# Patient Record
Sex: MALE | Race: WHITE | NOT HISPANIC OR LATINO | Employment: FULL TIME | URBAN - METROPOLITAN AREA
[De-identification: names, ages, dates, MRNs, and addresses within clinical notes are randomized per-mention and may not be internally consistent; named-entity substitution may affect disease eponyms.]

---

## 2016-11-22 LAB
ABO + RH BLD: NORMAL
ABO + RH BLD: NORMAL
BLD GP AB SCN SERPL QL: NORMAL
BLOOD BANK CMNT PATIENT-IMP: NORMAL
SPECIMEN EXP DATE BLD: NORMAL

## 2017-01-03 ENCOUNTER — OFFICE VISIT (OUTPATIENT)
Dept: INFUSION THERAPY | Facility: CLINIC | Age: 52
End: 2017-01-03
Attending: NURSE PRACTITIONER
Payer: COMMERCIAL

## 2017-01-03 ENCOUNTER — RADIANT APPOINTMENT (OUTPATIENT)
Dept: ULTRASOUND IMAGING | Facility: CLINIC | Age: 52
End: 2017-01-03
Attending: NURSE PRACTITIONER
Payer: COMMERCIAL

## 2017-01-03 VITALS
WEIGHT: 248.6 LBS | DIASTOLIC BLOOD PRESSURE: 57 MMHG | RESPIRATION RATE: 16 BRPM | SYSTOLIC BLOOD PRESSURE: 126 MMHG | TEMPERATURE: 97.2 F | HEART RATE: 85 BPM | BODY MASS INDEX: 33.71 KG/M2 | OXYGEN SATURATION: 98 %

## 2017-01-03 DIAGNOSIS — K74.60 CIRRHOSIS OF LIVER WITH ASCITES, UNSPECIFIED HEPATIC CIRRHOSIS TYPE (H): ICD-10-CM

## 2017-01-03 DIAGNOSIS — R18.8 CIRRHOSIS OF LIVER WITH ASCITES, UNSPECIFIED HEPATIC CIRRHOSIS TYPE (H): ICD-10-CM

## 2017-01-03 DIAGNOSIS — K74.60 HEPATIC CIRRHOSIS, UNSPECIFIED HEPATIC CIRRHOSIS TYPE (H): Primary | ICD-10-CM

## 2017-01-03 DIAGNOSIS — C22.0 HEPATOCELLULAR CARCINOMA (H): ICD-10-CM

## 2017-01-03 LAB
ALBUMIN SERPL-MCNC: 2.8 G/DL (ref 3.4–5)
BILIRUB SERPL-MCNC: 5.5 MG/DL (ref 0.2–1.3)
CREAT SERPL-MCNC: 0.82 MG/DL (ref 0.66–1.25)
GFR SERPL CREATININE-BSD FRML MDRD: NORMAL ML/MIN/1.7M2
INR PPP: 1.85 (ref 0.86–1.14)
SODIUM SERPL-SCNC: 130 MMOL/L (ref 133–144)

## 2017-01-03 PROCEDURE — 25000128 H RX IP 250 OP 636: Mod: ZF | Performed by: NURSE PRACTITIONER

## 2017-01-03 PROCEDURE — 85610 PROTHROMBIN TIME: CPT | Performed by: INTERNAL MEDICINE

## 2017-01-03 PROCEDURE — 82040 ASSAY OF SERUM ALBUMIN: CPT | Performed by: INTERNAL MEDICINE

## 2017-01-03 PROCEDURE — 96365 THER/PROPH/DIAG IV INF INIT: CPT | Mod: ZF

## 2017-01-03 PROCEDURE — 84295 ASSAY OF SERUM SODIUM: CPT | Performed by: INTERNAL MEDICINE

## 2017-01-03 PROCEDURE — P9047 ALBUMIN (HUMAN), 25%, 50ML: HCPCS | Mod: ZF | Performed by: NURSE PRACTITIONER

## 2017-01-03 PROCEDURE — 82565 ASSAY OF CREATININE: CPT | Performed by: INTERNAL MEDICINE

## 2017-01-03 PROCEDURE — 82247 BILIRUBIN TOTAL: CPT | Performed by: INTERNAL MEDICINE

## 2017-01-03 PROCEDURE — 27210190 US PARACENTESIS

## 2017-01-03 PROCEDURE — 27210995 ZZH RX 272: Mod: ZF | Performed by: NURSE PRACTITIONER

## 2017-01-03 RX ORDER — ALBUMIN (HUMAN) 12.5 G/50ML
12.5 SOLUTION INTRAVENOUS 4 TIMES DAILY PRN
Status: DISCONTINUED | OUTPATIENT
Start: 2017-01-03 | End: 2017-01-03 | Stop reason: HOSPADM

## 2017-01-03 RX ADMIN — ALBUMIN HUMAN 12.5 G: 0.25 SOLUTION INTRAVENOUS at 12:43

## 2017-01-03 RX ADMIN — ALBUMIN HUMAN 12.5 G: 0.25 SOLUTION INTRAVENOUS at 13:06

## 2017-01-03 RX ADMIN — LIDOCAINE HYDROCHLORIDE 20 ML: 10 INJECTION, SOLUTION INFILTRATION; PERINEURAL at 12:45

## 2017-01-03 RX ADMIN — ALBUMIN HUMAN 12.5 G: 0.25 SOLUTION INTRAVENOUS at 12:55

## 2017-01-03 RX ADMIN — ALBUMIN HUMAN 12.5 G: 0.25 SOLUTION INTRAVENOUS at 12:29

## 2017-01-03 NOTE — PROGRESS NOTES
Paracentesis Nursing Note  Camacho Bhagat presents [facility] for [facility].    During today's appointment orders from ALBAN Robison NP were completed.    Progress Note:  Patient identification verified by name and date of birth.  Assessment completed.  Vitals monitored throughout appointment and recorded in Doc Flowsheets.  See proceduralist note in ultrasound.    Date of consent or authorization: 12/2/2016.  Invasive Procedure Safety Checklist was completed and sent for scanning.         The following labs were communicated to provider performing paracentesis:  PLT       59   12/27/2016    Total amount of ascites fluid drained: 4.9 liters.  Color of ascites fluid: yellow.  Total amount of albumin given: 50  grams.    Patient tolerated procedure well.    Post procedure,denies pain or discomfort post paracentesis.      Discharge Plan:  Discharge instructions were reviewed with patient.  Patient/Representative verbalized understanding and all questions were answered.   Discharged from [facility] in stable condition.    Mari Mathew RN    Administrations This Visit     albumin human 25 % injection 12.5 g     Admin Date Action Dose Route Administered By             01/03/2017 New Bag 12.5 g Intravenous Mari Mathew RN              Admin Date Action Dose Route Administered By             01/03/2017 New Bag 12.5 g Intravenous Mari Mathew RN              Admin Date Action Dose Route Administered By             01/03/2017 New Bag 12.5 g Intravenous Mari Mathew RN              Admin Date Action Dose Route Administered By             01/03/2017 New Bag 12.5 g Intravenous Mari Mathew RN                    lidocaine BUFFERED 1 % injection 20 mL     Admin Date Action Dose Route Administered By             01/03/2017 Given by Other Clinician 20 mL Injection Mari Mathew RN                          /57 mmHg  Pulse 85   Temp(Src) 97.2  F (36.2  C) (Oral)  Resp 16  Wt 112.764 kg (248 lb 9.6 oz)  SpO2 98%

## 2017-01-06 ENCOUNTER — OFFICE VISIT (OUTPATIENT)
Dept: INFUSION THERAPY | Facility: CLINIC | Age: 52
End: 2017-01-06
Attending: NURSE PRACTITIONER
Payer: COMMERCIAL

## 2017-01-06 ENCOUNTER — RADIANT APPOINTMENT (OUTPATIENT)
Dept: ULTRASOUND IMAGING | Facility: CLINIC | Age: 52
End: 2017-01-06
Attending: NURSE PRACTITIONER
Payer: COMMERCIAL

## 2017-01-06 VITALS
SYSTOLIC BLOOD PRESSURE: 149 MMHG | TEMPERATURE: 97 F | RESPIRATION RATE: 16 BRPM | DIASTOLIC BLOOD PRESSURE: 74 MMHG | OXYGEN SATURATION: 99 % | HEART RATE: 96 BPM | BODY MASS INDEX: 32.27 KG/M2 | WEIGHT: 238 LBS

## 2017-01-06 DIAGNOSIS — K74.60 HEPATIC CIRRHOSIS, UNSPECIFIED HEPATIC CIRRHOSIS TYPE (H): Primary | ICD-10-CM

## 2017-01-06 PROCEDURE — 25000128 H RX IP 250 OP 636: Mod: ZF | Performed by: NURSE PRACTITIONER

## 2017-01-06 PROCEDURE — 27210190 US PARACENTESIS

## 2017-01-06 PROCEDURE — 40000556 ZZH STATISTIC PERIPHERAL IV START W US GUIDANCE: Mod: ZF

## 2017-01-06 PROCEDURE — 96365 THER/PROPH/DIAG IV INF INIT: CPT | Mod: ZF

## 2017-01-06 PROCEDURE — P9047 ALBUMIN (HUMAN), 25%, 50ML: HCPCS | Mod: ZF | Performed by: NURSE PRACTITIONER

## 2017-01-06 PROCEDURE — 27210995 ZZH RX 272: Mod: ZF | Performed by: NURSE PRACTITIONER

## 2017-01-06 RX ORDER — ALBUMIN (HUMAN) 12.5 G/50ML
12.5 SOLUTION INTRAVENOUS 4 TIMES DAILY PRN
Status: DISCONTINUED | OUTPATIENT
Start: 2017-01-06 | End: 2017-01-06 | Stop reason: HOSPADM

## 2017-01-06 RX ADMIN — ALBUMIN HUMAN 25 G: 0.25 SOLUTION INTRAVENOUS at 12:48

## 2017-01-06 RX ADMIN — LIDOCAINE HYDROCHLORIDE 20 ML: 10 INJECTION, SOLUTION INFILTRATION; PERINEURAL at 12:45

## 2017-01-06 NOTE — PATIENT INSTRUCTIONS
Dear Camacho Bhagat    Thank you for choosing [facility] ([facility]) for your procedure.  The following information is a summary of our appointment as well as important reminders.      Please refer to your hospital discharge instructions for details on home care services, future appointments, phone numbers, and diet/activity levels.    Additional information: Your paracentesis procedure today, beginning weight 244.9 lb (111.0 kg), removed 2.6 liters ascites fluid, gave 25 grams albumin, ending weight 238.1 lb (107.9 kg).    We look forward in seeing you on your next appointment here at [facility].  Please don t hesitate to call us at [phone] to reschedule any of your appointments or to speak with one of the [facility] registered nurses.  It was a pleasure taking care of you today.    Sincerely,  Estefani Kirby RN  [facility]  [address] [place] [address]  [address]  Phone:  [phone]  DISCHARGE INSTRUCTIONS FOLLOWING ABDOMINAL PARACENTESIS    After you go home:    No strenuous activity for 24 hours    Resume your regular diet    Limit fluid intake for the first 48 hours to no more than 2 quarts per day.  There should be minimal drainage from the needle site.  If drainage does occur and soaks through the bandage, apply gentle pressure with your hand for 5 minutes.    Notify MD for the following:    Excessive drainage    Excessive swelling, redness or tenderness at the needle site    Fever greater than 101 degrees F    Dizziness or light-headedness when getting up or walking      IF THIS IS A MEDICAL EMERGENCY, CALL [phone]    If you have any questions or concerns    Contact the Hepatology Clinic:   [phone]    If you are a post-transplant patient, contact the Transplant Office:  [phone]    If this is after hours, contact the hospital :  [phone] and as to have the GI  resident on call paged                   I have received and understand my discharge instructions and I have all of my personal belongings.          ------------------------------------------------------        ---------------------------------------------------    Patient / Significant Other's Signature     Relationship

## 2017-01-06 NOTE — PROGRESS NOTES
Paracentesis Nursing Note  Caamcho Bhagat presents today to [facility] for a [facility]esis.    During today's appointment orders from Abigail Robison CNP were completed.    Progress Note:  Patient identification verified by name and date of birth.  Assessment completed.  Vitals monitored throughout appointment and recorded in Doc Flowsheets.  See proceduralist note in ultrasound.    Date of consent or authorization: 1/3/2017.  Invasive Procedure Safety Checklist was completed and sent for scanning.     Paracentesis performed by Jesse Desai PA-C Radiology.    The following labs were communicated to provider performing paracentesis:  PLT       59   12/27/2016    Total amount of ascites fluid drained: 2.6 liters.  Color of ascites fluid: yellow.  Total amount of albumin given: 25  grams.    Patient tolerated procedure well.    Post procedure,denies pain or discomfort post paracentesis.      Discharge Plan:  Discharge instructions were reviewed with patient.  Patient/Representative verbalized understanding and all questions were answered.   Discharged from [facility] in stable condition.    Estefani Kirby RN    Administrations This Visit     albumin human 25 % injection 12.5 g     Admin Date Action Dose Route Administered By             01/06/2017 New Bag 50 g Intravenous Estefani Kirby RN                    lidocaine BUFFERED 1 % injection 20 mL     Admin Date Action Dose Route Administered By             01/06/2017 Given by Other 20 mL Injection Estefani Kirby RN                          /67 mmHg  Pulse 85  Temp(Src) 97  F (36.1  C) (Oral)  Resp 16  Wt 111.086 kg (244 lb 14.4 oz)  SpO2 99%

## 2017-01-06 NOTE — MR AVS SNAPSHOT
After Visit Summary   1/6/2017    Camacho Bhagat    MRN: [id number]           Patient Information     Date Of Birth          1965        Visit Information        Provider Department      1/6/2017 12:00 PM Provider, Jonathon Spec Inf Para;  [facility]        Today's Diagnoses     Hepatic cirrhosis, unspecified hepatic cirrhosis type (H)    -  1       Care Instructions    Dear Camacho Bhagat    Thank you for choosing [facility] ([facility]) for your procedure.  The following information is a summary of our appointment as well as important reminders.      Please refer to your hospital discharge instructions for details on home care services, future appointments, phone numbers, and diet/activity levels.    Additional information: Your paracentesis procedure today, beginning weight 244.9 lb (111.0 kg), removed 2.6 liters ascites fluid, gave 25 grams albumin, ending weight     We look forward in seeing you on your next appointment here at [facility].  Please don t hesitate to call us at [phone] to reschedule any of your appointments or to speak with one of the [facility] registered nurses.  It was a pleasure taking care of you today.    Sincerely,  Estefani Kirby, RN  [facility]  [address] [place] [address]  Phone:  [phone]  DISCHARGE INSTRUCTIONS FOLLOWING ABDOMINAL PARACENTESIS    After you go home:    No strenuous activity for 24 hours    Resume your regular diet    Limit fluid intake for the first 48 hours to no more than 2 quarts per day.  There should be minimal drainage from the needle site.  If drainage does occur and soaks through the bandage, apply gentle pressure with your hand for 5 minutes.    Notify MD for the following:    Excessive drainage    Excessive swelling, redness or tenderness at the needle  site    Fever greater than 101 degrees F    Dizziness or light-headedness when getting up or walking      IF THIS IS A MEDICAL EMERGENCY, CALL [phone]    If you have any questions or concerns    Contact the Hepatology Clinic:   [phone]    If you are a post-transplant patient, contact the Transplant Office:  [phone]    If this is after hours, contact the hospital :  [phone] and as to have the GI resident on call paged                   I have received and understand my discharge instructions and I have all of my personal belongings.          ------------------------------------------------------        ---------------------------------------------------    Patient / Significant Other's Signature     Relationship            Follow-ups after your visit        Your next 10 appointments already scheduled     Jan 06, 2017  4:00 PM   (Arrive by 3:45 PM)   MR ABDOMEN W/O & W CONTRAST with [address] [facility] [place] [facility] ([facility] [place])    [address] [address] [phone]           Take your medicines as usual, unless your doctor tells you not to. Bring a list of your current medicines to your exam (including vitamins, minerals and over-the-counter drugs). Also bring the results of similar scans you may have had.    The day before your exam, drink extra fluids at least six 8-ounce glasses (unless your doctor tells you to restrict your fluids).   Have a blood test (creatinine test) within 30 days of your exam. Go to your clinic or Diagnostic Imaging Department for this test.   Do not eat or drink for 6 hours prior to exam.  The MRI machine uses a strong magnet. Please wear clothes without metal (snaps, zippers). A sweatsuit works well, or we may give you a hospital gown.  Please remove any body piercings and hair extensions before you arrive. You will also remove watches, jewelry, hairpins, wallets, dentures, partial dental plates and  hearing aids. You may wear contact lenses, and you may be able to wear your rings. We have a safe place to keep your personal items, but it is safer to leave them at home.   **IMPORTANT** THE INSTRUCTIONS BELOW ARE ONLY FOR THOSE PATIENTS WHO HAVE BEEN TOLD THEY WILL RECEIVE SEDATION OR GENERAL ANESTHESIA DURING THEIR MRI PROCEDURE:  IF YOU WILL RECEIVE SEDATION (take medicine to help you relax during your exam):   You must get the medicine from your doctor before you arrive. Bring the medicine to the exam. Do not take it at home.   Arrive one hour early. Bring someone who can take you home after the test. Your medicine will make you sleepy. After the exam, you may not drive, take a bus or take a taxi by yourself.   No eating 8 hours before your exam. You may have clear liquids up until 4 hours before your exam. (Clear liquids include water, clear tea, black coffee and fruit juice without pulp.)  IF YOU WILL RECEIVE ANESTHESIA (be asleep for your exam):   Arrive 1 1/2 hours early. Bring someone who can take you home after the test. You may not drive, take a bus or take a taxi by yourself.   No eating 8 hours before your exam. You may have clear liquids up until 4 hours before your exam. (Clear liquids include water, clear tea, black coffee and fruit juice without pulp.)  If you have any questions, please contact your Imaging Department exam site.            Aric 10, 2017  2:00 PM   Paracentesis Visit with  Spec Inf Para Provider, UC 40 ATC   [facility] ([facility])    [address] [address]   [phone]            Jan 13, 2017 12:00 PM   Paracentesis Visit with  Spec Inf Para Provider, UC 40 ATC   [facility] ([facility])    [address] [address]   [phone]            Jan 18, 2017 12:00 PM    Paracentesis Visit with Uc Spec Inf Para Provider, UC 39 ATC   [facility] ([facility])    [address]  2nd Floor  [facility] [id number] [phone]            Jan 20, 2017  9:30 AM   Paracentesis Visit with Uc Spec Inf Para Provider, UC 40 ATC   [facility] ([facility])    [address]  2nd Floor  [facility] [id number] [phone]            Jan 23, 2017  2:00 PM   (Arrive by 1:45 PM)   Return General Liver with Toñito Camejo MD   [facility] ([facility])    [address]  3rd Floor  [facility] [id number] [phone]              Who to contact     If you have questions or need follow up information about today's clinic visit or your schedule please contact [facility] directly at [phone].  Normal or non-critical lab and imaging results will be communicated to you by Silistixhart, letter or phone within 4 business days after the clinic has received the results. If you do not hear from us within 7 days, please contact the clinic through [employer] or phone. If you have a critical or abnormal lab result, we will notify you by phone as soon as possible.  Submit refill requests through cityguru or call your pharmacy and they will forward the refill request to us. Please allow 3 business days for your refill to be completed.          Additional Information About Your Visit        SilistixharGateRocket Information     cityguru gives you secure access to your electronic health record. If you see a primary care provider, you can also send messages to your care team and make appointments. If you have questions, please call your primary care clinic.  If you do not have a primary care provider, please call [phone] and they will assist you.        Care EveryWhere ID     This  is your Care EveryWhere ID. This could be used by other organizations to access your [facility] medical records  [id number]        Your Vitals Were     Pulse Temperature Respirations Pulse Oximetry          96 97  F (36.1  C) (Oral) 16 99%         Blood Pressure from Last 3 Encounters:   01/06/17 149/74   01/03/17 126/57   12/30/16 149/72    Weight from Last 3 Encounters:   01/06/17 111.086 kg (244 lb 14.4 oz)   01/03/17 112.764 kg (248 lb 9.6 oz)   12/30/16 114.08 kg (251 lb 8 oz)              We Performed the Following     ABO/Rh type and screen     Treatment Conditions     US Paracentesis        Primary Care Provider Office Phone # Fax #    Shay Kirkpatrick [phone]        [facility] [address] [place] [address] [address]  [facility] [address]        Thank you!     Thank you for choosing [facility]  for your care. Our goal is always to provide you with excellent care. Hearing back from our patients is one way we can continue to improve our services. Please take a few minutes to complete the written survey that you may receive in the mail after your visit with us. Thank you!             Your Updated Medication List - Protect others around you: Learn how to safely use, store and throw away your medicines at www.disposemymeds.org.          This list is accurate as of: 1/6/17  1:24 PM.  Always use your most recent med list.                   Brand Name Dispense Instructions for use    aspirin 325 MG tablet          cyclobenzaprine 10 MG tablet    FLEXERIL    30 tablet    Take 1 tablet (10 mg) by mouth 3 times daily as needed for muscle spasms       furosemide 40 MG tablet    LASIX    30 tablet    Take 1 tablet (40 mg) by mouth daily       gabapentin 300 MG capsule    NEURONTIN    60 capsule    Take 2 capsules (600 mg) by mouth At Bedtime       glucagon 1 MG Solr injection     1 each    Inject 1 mg Subcutaneous every 15 minutes as needed for low blood sugar (May  repeat x 1 only)       insulin glargine 100 UNIT/ML injection    LANTUS    19.5 mL    Inject 65 Units Subcutaneous every 24 hours       * insulin lispro 100 UNIT/ML injection    humaLOG    1 vial    1 unit per 50 glucose over 150: If blood glucose 150 to 200 give 1 unit, if glucose 200-250 give 2 total units, 250-300 give 3 total units (1+1+1).  Give enough for one week.       * insulin lispro 100 UNIT/ML injection    humaLOG    5.4 mL    Inject 6 Units Subcutaneous 3 times daily (with meals)       lactobacillus rhamnosus (GG) capsule     60 capsule    Take 1 capsule by mouth 2 times daily       lactulose 10 GM/15ML solution    CHRONULAC    3600 mL    Take 30 mLs (20 g) by mouth 4 times daily Take 4 times daily. Goal is to have 3-4 bowel movements per day.       lisinopril 40 MG tablet    PRINIVIL/ZESTRIL         metFORMIN 500 MG tablet    GLUCOPHAGE         MULTIPLE VITAMIN PO          omeprazole 20 MG CR capsule    priLOSEC    60 capsule    Take 1 capsule (20 mg) by mouth 2 times daily (before meals)       ondansetron 4 MG ODT tab    ZOFRAN-ODT    30 tablet    Take 1 tablet (4 mg) by mouth every 8 hours as needed for nausea       oxybutynin 5 MG tablet    DITROPAN    60 tablet    Take 1 tablet (5 mg) by mouth 2 times daily       pantoprazole 40 MG EC tablet    PROTONIX         phenazopyridine 100 MG tablet    PYRIDIUM    90 tablet    Take 1 tablet (100 mg) by mouth 3 times daily (with meals)       phytonadione 5 MG tablet    MEPHYTON    30 tablet    Take 0.5 tablets (2.5 mg) by mouth daily       rifaximin 550 MG Tabs tablet    XIFAXAN    60 tablet    Take 1 tablet (550 mg) by mouth 2 times daily       sulfamethoxazole-trimethoprim 800-160 MG per tablet    BACTRIM DS/SEPTRA DS    30 tablet    Take 1 tablet by mouth daily       * Notice:  This list has 2 medication(s) that are the same as other medications prescribed for you. Read the directions carefully, and ask your doctor or other care provider to review them  with you.

## 2017-01-10 ENCOUNTER — OFFICE VISIT (OUTPATIENT)
Dept: INFUSION THERAPY | Facility: CLINIC | Age: 52
End: 2017-01-10
Attending: INTERNAL MEDICINE
Payer: COMMERCIAL

## 2017-01-10 ENCOUNTER — RADIANT APPOINTMENT (OUTPATIENT)
Dept: ULTRASOUND IMAGING | Facility: CLINIC | Age: 52
End: 2017-01-10
Attending: NURSE PRACTITIONER
Payer: COMMERCIAL

## 2017-01-10 VITALS — BODY MASS INDEX: 32.54 KG/M2 | WEIGHT: 240 LBS | OXYGEN SATURATION: 98 % | TEMPERATURE: 98.7 F

## 2017-01-10 DIAGNOSIS — K74.60 HEPATIC CIRRHOSIS, UNSPECIFIED HEPATIC CIRRHOSIS TYPE (H): Primary | ICD-10-CM

## 2017-01-10 DIAGNOSIS — C22.0 HEPATOCELLULAR CARCINOMA (H): ICD-10-CM

## 2017-01-10 DIAGNOSIS — K74.60 CIRRHOSIS OF LIVER WITH ASCITES, UNSPECIFIED HEPATIC CIRRHOSIS TYPE (H): ICD-10-CM

## 2017-01-10 DIAGNOSIS — R18.8 CIRRHOSIS OF LIVER WITH ASCITES, UNSPECIFIED HEPATIC CIRRHOSIS TYPE (H): ICD-10-CM

## 2017-01-10 LAB
ALBUMIN SERPL-MCNC: 3.2 G/DL (ref 3.4–5)
BILIRUB SERPL-MCNC: 7.4 MG/DL (ref 0.2–1.3)
CREAT SERPL-MCNC: 0.69 MG/DL (ref 0.66–1.25)
GFR SERPL CREATININE-BSD FRML MDRD: NORMAL ML/MIN/1.7M2
INR PPP: 1.49 (ref 0.86–1.14)
SODIUM SERPL-SCNC: 126 MMOL/L (ref 133–144)

## 2017-01-10 PROCEDURE — 27210190 US PARACENTESIS

## 2017-01-10 PROCEDURE — 82040 ASSAY OF SERUM ALBUMIN: CPT | Performed by: INTERNAL MEDICINE

## 2017-01-10 PROCEDURE — 82247 BILIRUBIN TOTAL: CPT | Performed by: INTERNAL MEDICINE

## 2017-01-10 PROCEDURE — 25000128 H RX IP 250 OP 636: Mod: ZF | Performed by: NURSE PRACTITIONER

## 2017-01-10 PROCEDURE — 96365 THER/PROPH/DIAG IV INF INIT: CPT | Mod: ZF

## 2017-01-10 PROCEDURE — 84295 ASSAY OF SERUM SODIUM: CPT | Performed by: INTERNAL MEDICINE

## 2017-01-10 PROCEDURE — 27210995 ZZH RX 272: Mod: ZF | Performed by: NURSE PRACTITIONER

## 2017-01-10 PROCEDURE — P9047 ALBUMIN (HUMAN), 25%, 50ML: HCPCS | Mod: ZF | Performed by: NURSE PRACTITIONER

## 2017-01-10 PROCEDURE — 82565 ASSAY OF CREATININE: CPT | Performed by: INTERNAL MEDICINE

## 2017-01-10 PROCEDURE — 85610 PROTHROMBIN TIME: CPT | Performed by: INTERNAL MEDICINE

## 2017-01-10 RX ORDER — ALBUMIN (HUMAN) 12.5 G/50ML
12.5 SOLUTION INTRAVENOUS 4 TIMES DAILY PRN
Status: DISCONTINUED | OUTPATIENT
Start: 2017-01-10 | End: 2017-01-10 | Stop reason: HOSPADM

## 2017-01-10 RX ADMIN — ALBUMIN HUMAN 50 G: 0.25 SOLUTION INTRAVENOUS at 14:51

## 2017-01-10 RX ADMIN — LIDOCAINE HYDROCHLORIDE 20 ML: 10 INJECTION, SOLUTION INFILTRATION; PERINEURAL at 14:52

## 2017-01-10 NOTE — PROGRESS NOTES
Paracentesis Nursing Note  Camacho Bhagat presents today to [facility] for [facility].  During today's appointment orders from Abigail Robison NP were completed.    Progress Note:  Patient identification verified by name and date of birth.  Assessment completed.  Vitals monitored throughout appointment and recorded in Doc Flowsheets.  See proceduralist note in ultrasound.    Date of consent or authorization: 1/3/17.  Invasive Procedure Safety Checklist was completed and sent for scanning.     Paracentesis performed by Jesse Desai PA-C Radiology.    The following labs were communicated to provider performing paracentesis:  PLT       59   12/27/2016    Total amount of ascites fluid drained: 4.3 liters.  Color of ascites fluid: yellow.  Total amount of albumin given: 50  grams.    Patient tolerated procedure well.    Post procedure,denies pain or discomfort post paracentesis.      Discharge Plan:  Discharge instructions were reviewed with patient.  Patient/Representative verbalized understanding and all questions were answered.   Discharged [facility] in stable condition.    Kyra Wallace RN    Administrations This Visit     albumin human 25 % injection 12.5 g     Admin Date Action Dose Route Administered By             01/10/2017 New Bag 50 g Intravenous Kyra Wallace RN                    lidocaine BUFFERED 1 % injection 20 mL     Admin Date Action Dose Route Administered By             01/10/2017 Given by Other Clinician 20 mL Injection Kyra Wallace RN                          Temp(Src) 98.7  F (37.1  C) (Oral)  Wt 113.036 kg (249 lb 3.2 oz)  SpO2 98%

## 2017-01-12 NOTE — PROGRESS NOTES
Procedure date: 1/10/2017:    1. Ultrasound guided therapeutic paracentesis (CSC).    Preop diagnosis: Ascites.    Postop diagnosis: Same.    Proceduralist: Miguel Desai PA-C    Assist: None.    Attending on duty: Jersey Bernardo MD    Medications: No intravenous sedation was administered. 1% buffered lidocaine, 10 cc.  50 grams 25% albumin IV.     Nursing: The patient was placed on continuous monitoring. The patient remained stable throughout the procedure.    PROCEDURE: The patient understood the limitations, alternatives, and risks of the procedure and requested the procedure be performed. Both written and oral consent were obtained.    The patient was identified by name and date of birth, and placed in the recumbent position. The left lower quadrant was prepped and draped in the usual sterile fashion. Ultrasound evaluation revealed diffuse subcutaneous edema, as well as a moderate fluid collection at this location, and an image was saved. The site overlying the fluid was anesthetized with 1% buffered lidocaine. Under ultrasound guidance, a 5 Peruvian, 15 cm. Pigtail Yueh catheter was advanced into the abdominal space, and an image was saved. Syringe aspiration revealed initial return of clear serous fluid. Catheter to vacuum drainage, with 4300 cc ascites aspirated. Repeat ultrasound revealed no significant ascites remaining. Catheter removed. Sterile dressing applied. Images were saved throughout the procedure. Procedure was well tolerated, with no immediate complications.    Estimate blood loss: Less than 1 cc.    Specimens: None.    IMPRESSION: Ultrasound guided therapeutic paracentesis. Total of  4300 cc of clear serous ascites aspirated.    PLAN: Followup per primary team.

## 2017-01-13 ENCOUNTER — DOCUMENTATION ONLY (OUTPATIENT)
Dept: TRANSPLANT | Facility: CLINIC | Age: 52
End: 2017-01-13

## 2017-01-13 ENCOUNTER — OFFICE VISIT (OUTPATIENT)
Dept: INFUSION THERAPY | Facility: CLINIC | Age: 52
End: 2017-01-13
Attending: NURSE PRACTITIONER
Payer: COMMERCIAL

## 2017-01-13 ENCOUNTER — TELEPHONE (OUTPATIENT)
Dept: GASTROENTEROLOGY | Facility: CLINIC | Age: 52
End: 2017-01-13

## 2017-01-13 ENCOUNTER — RADIANT APPOINTMENT (OUTPATIENT)
Dept: ULTRASOUND IMAGING | Facility: CLINIC | Age: 52
End: 2017-01-13
Attending: NURSE PRACTITIONER
Payer: COMMERCIAL

## 2017-01-13 VITALS
TEMPERATURE: 97.8 F | OXYGEN SATURATION: 98 % | HEART RATE: 82 BPM | SYSTOLIC BLOOD PRESSURE: 156 MMHG | WEIGHT: 214.4 LBS | DIASTOLIC BLOOD PRESSURE: 72 MMHG | BODY MASS INDEX: 29.07 KG/M2

## 2017-01-13 DIAGNOSIS — K74.60 HEPATIC CIRRHOSIS, UNSPECIFIED HEPATIC CIRRHOSIS TYPE (H): Primary | ICD-10-CM

## 2017-01-13 PROCEDURE — P9047 ALBUMIN (HUMAN), 25%, 50ML: HCPCS | Mod: ZF | Performed by: NURSE PRACTITIONER

## 2017-01-13 PROCEDURE — 27210190 US PARACENTESIS

## 2017-01-13 PROCEDURE — 25000128 H RX IP 250 OP 636: Mod: ZF | Performed by: NURSE PRACTITIONER

## 2017-01-13 PROCEDURE — 27210995 ZZH RX 272: Mod: ZF | Performed by: NURSE PRACTITIONER

## 2017-01-13 PROCEDURE — 96365 THER/PROPH/DIAG IV INF INIT: CPT | Mod: ZF

## 2017-01-13 RX ORDER — ALBUMIN (HUMAN) 12.5 G/50ML
12.5 SOLUTION INTRAVENOUS 4 TIMES DAILY PRN
Status: DISCONTINUED | OUTPATIENT
Start: 2017-01-13 | End: 2017-01-13 | Stop reason: HOSPADM

## 2017-01-13 RX ADMIN — LIDOCAINE HYDROCHLORIDE 20 ML: 10 INJECTION, SOLUTION INFILTRATION; PERINEURAL at 12:51

## 2017-01-13 RX ADMIN — ALBUMIN HUMAN 50 G: 0.25 SOLUTION INTRAVENOUS at 12:51

## 2017-01-13 NOTE — PROGRESS NOTES
1/6/17 MR  1.) 4 cm 5T active HCC    Recs:  - follow up imaging in 3 months  - no treatment options at this time due to his labs / liver function

## 2017-01-13 NOTE — TELEPHONE ENCOUNTER
Detailed message left on patient's phone regarding low sodium level and to begin 1500 ml fluid restriction.  Triage number provided if patient has any questions.

## 2017-01-13 NOTE — PROGRESS NOTES
Paracentesis Nursing Note  Camacho hBagat presents [facility].    During today's appointment orders from Ricki Hayes were completed.    Progress Note:  Patient identification verified by name and date of birth.  Assessment completed.  Vitals monitored throughout appointment and recorded in Doc Flowsheets.  See proceduralist note in ultrasound.    Date of consent or authorization: 1/3.  Invasive Procedure Safety Checklist was completed and sent for scanning.     Paracentesis performed by Mitch Dinh PA-C Radiology.    The following labs were communicated to provider performing paracentesis:  PLT       59   12/27/2016    Total amount of ascites fluid drained: 4.2 liters.  Color of ascites fluid: yellow.  Total amount of albumin given: 50  grams.    Patient tolerated procedure well.    Post procedure,denies pain or discomfort post paracentesis.      Discharge Plan:  Discharge instructions were reviewed with patient.  Patient/Representative verbalized understanding and all questions were answered.   [facility] in stable condition.    Holli Jha RN       Administrations This Visit     albumin human 25 % injection 12.5 g     Admin Date Action Dose Route Administered By             01/13/2017 New Bag 50 g Intravenous Holli Jha RN                    lidocaine BUFFERED 1 % injection 20 mL     Admin Date Action Dose Route Administered By             01/13/2017 Given by Other Clinician 20 mL Injection Holli Jha RN                            /89 mmHg  Pulse 86  Temp(Src) 97.8  F (36.6  C) (Oral)  Wt 113.626 kg (250 lb 8 oz)  SpO2 98%

## 2017-01-13 NOTE — TELEPHONE ENCOUNTER
Patient returned call for further explanation of fluid restriction.  His concern is the amount of liquid needed for his medications such as lactulose.  Discussed decreasing fluids as best he can, while continuing to take lactulose as needed to maintain about 3 bowel movements daily.  Will recheck labs next week as planned.  Patient verbalized understanding.

## 2017-01-16 ENCOUNTER — DOCUMENTATION ONLY (OUTPATIENT)
Dept: TRANSPLANT | Facility: CLINIC | Age: 52
End: 2017-01-16

## 2017-01-16 NOTE — PROGRESS NOTES
Following submitted for 1st HCC extension:    This is the first extension of a previously approved initial exception that does not meet criteria for automatic approval. The patient is a 51 year old with cryptogenic/CASTAÑEDA cirrhosis who had a MRI on 12/9/15 showing a 5.3 cm 5x lesion with an AFP of 4 on 12/14/15. The patient underwent TACE on 1/27/16, radioembolization on 4/27/16, and TACE on 9/8/16. Follow up MRI on 1/6/17 revealed a 4 cm 5T HCC with an AFP of 3 on 12/2/16. This patient has had a good response to treatment. We are requesting the patient be granted the first HCC MELD exception extension.

## 2017-01-17 ENCOUNTER — TELEPHONE (OUTPATIENT)
Dept: GASTROENTEROLOGY | Facility: CLINIC | Age: 52
End: 2017-01-17

## 2017-01-17 NOTE — TELEPHONE ENCOUNTER
Left message for pt reminding them of upcoming appointment.  Instructed pt to bring updated medications list.  Patient instructed to arrive early for check-in  Aric Villanueva CMA

## 2017-01-18 ENCOUNTER — OFFICE VISIT (OUTPATIENT)
Dept: INFUSION THERAPY | Facility: CLINIC | Age: 52
End: 2017-01-18
Attending: NURSE PRACTITIONER
Payer: COMMERCIAL

## 2017-01-18 ENCOUNTER — RADIANT APPOINTMENT (OUTPATIENT)
Dept: ULTRASOUND IMAGING | Facility: CLINIC | Age: 52
End: 2017-01-18
Attending: NURSE PRACTITIONER
Payer: COMMERCIAL

## 2017-01-18 VITALS
HEART RATE: 79 BPM | SYSTOLIC BLOOD PRESSURE: 155 MMHG | DIASTOLIC BLOOD PRESSURE: 64 MMHG | TEMPERATURE: 98.1 F | OXYGEN SATURATION: 98 % | WEIGHT: 238.3 LBS | BODY MASS INDEX: 32.31 KG/M2

## 2017-01-18 DIAGNOSIS — R18.8 CIRRHOSIS OF LIVER WITH ASCITES, UNSPECIFIED HEPATIC CIRRHOSIS TYPE (H): ICD-10-CM

## 2017-01-18 DIAGNOSIS — C22.0 HEPATOCELLULAR CARCINOMA (H): ICD-10-CM

## 2017-01-18 DIAGNOSIS — K74.60 HEPATIC CIRRHOSIS, UNSPECIFIED HEPATIC CIRRHOSIS TYPE (H): Primary | ICD-10-CM

## 2017-01-18 DIAGNOSIS — K74.60 CIRRHOSIS OF LIVER WITH ASCITES, UNSPECIFIED HEPATIC CIRRHOSIS TYPE (H): ICD-10-CM

## 2017-01-18 LAB
ALBUMIN SERPL-MCNC: 2.3 G/DL (ref 3.4–5)
BILIRUB SERPL-MCNC: 11 MG/DL (ref 0.2–1.3)
CREAT SERPL-MCNC: 0.46 MG/DL (ref 0.66–1.25)
GFR SERPL CREATININE-BSD FRML MDRD: ABNORMAL ML/MIN/1.7M2
INR PPP: 1.43 (ref 0.86–1.14)
SODIUM SERPL-SCNC: 132 MMOL/L (ref 133–144)

## 2017-01-18 PROCEDURE — 27210190 US PARACENTESIS

## 2017-01-18 PROCEDURE — 85610 PROTHROMBIN TIME: CPT | Performed by: INTERNAL MEDICINE

## 2017-01-18 PROCEDURE — 82040 ASSAY OF SERUM ALBUMIN: CPT | Performed by: INTERNAL MEDICINE

## 2017-01-18 PROCEDURE — P9047 ALBUMIN (HUMAN), 25%, 50ML: HCPCS | Mod: ZF | Performed by: NURSE PRACTITIONER

## 2017-01-18 PROCEDURE — 84295 ASSAY OF SERUM SODIUM: CPT | Performed by: INTERNAL MEDICINE

## 2017-01-18 PROCEDURE — 96365 THER/PROPH/DIAG IV INF INIT: CPT | Mod: ZF

## 2017-01-18 PROCEDURE — 82247 BILIRUBIN TOTAL: CPT | Performed by: INTERNAL MEDICINE

## 2017-01-18 PROCEDURE — 82565 ASSAY OF CREATININE: CPT | Performed by: INTERNAL MEDICINE

## 2017-01-18 PROCEDURE — 25000128 H RX IP 250 OP 636: Mod: ZF | Performed by: NURSE PRACTITIONER

## 2017-01-18 PROCEDURE — 27210995 ZZH RX 272: Mod: ZF | Performed by: NURSE PRACTITIONER

## 2017-01-18 RX ORDER — ALBUMIN (HUMAN) 12.5 G/50ML
12.5 SOLUTION INTRAVENOUS 4 TIMES DAILY PRN
Status: DISCONTINUED | OUTPATIENT
Start: 2017-01-18 | End: 2017-01-18 | Stop reason: HOSPADM

## 2017-01-18 RX ADMIN — ALBUMIN HUMAN 12.5 G: 0.25 SOLUTION INTRAVENOUS at 13:34

## 2017-01-18 RX ADMIN — LIDOCAINE HYDROCHLORIDE 20 ML: 10 INJECTION, SOLUTION INFILTRATION; PERINEURAL at 13:08

## 2017-01-18 RX ADMIN — ALBUMIN HUMAN 12.5 G: 0.25 SOLUTION INTRAVENOUS at 13:24

## 2017-01-18 RX ADMIN — ALBUMIN HUMAN 12.5 G: 0.25 SOLUTION INTRAVENOUS at 13:43

## 2017-01-18 RX ADMIN — ALBUMIN HUMAN 12.5 G: 0.25 SOLUTION INTRAVENOUS at 13:18

## 2017-01-18 NOTE — PROGRESS NOTES
Paracentesis Nursing Note  Camacho Bhagat presents today to [facility] for [facility].    During today's appointment orders from Abigail Robison CNP and Toñito Camejo MD were completed.    Progress Note:  Patient identification verified by name and date of birth.  Assessment completed.  Vitals monitored throughout appointment and recorded in Doc Flowsheets.  See proceduralist note in ultrasound.    Date of consent or authorization: 1-3-17.  Invasive Procedure Safety Checklist was completed and sent for scanning.     Paracentesis performed by Jesse Desai PA-C Radiology.    The following labs were communicated to provider performing paracentesis:  PLT       59   12/27/2016    Total amount of ascites fluid drained: 6.8 liters.  Color of ascites fluid: yellow and clear.  Total amount of albumin given: 50  grams.    Patient tolerated procedure well.    Post procedure,denies pain or discomfort post paracentesis.      Discharge Plan:  Discharge instructions were reviewed with patient.  Patient/Representative verbalized understanding and all questions were answered.   Discharged from [facility] in stable condition.    Inna Villasenor RN    Administrations This Visit     albumin human 25 % injection 12.5 g     Admin Date Action Dose Route Administered By             01/18/2017 New Bag 12.5 g Intravenous Inna Villasenor RN              Admin Date Action Dose Route Administered By             01/18/2017 New Bag 12.5 g Intravenous Inna Villasenor RN              Admin Date Action Dose Route Administered By             01/18/2017 New Bag 12.5 g Intravenous Inna Villasenor RN              Admin Date Action Dose Route Administered By             01/18/2017 New Bag 12.5 g Intravenous Inna Villasenor RN                    lidocaine BUFFERED 1 % injection 20 mL     Admin Date Action Dose Route Administered By             01/18/2017 Given by Other  Clinician 20 mL Injection Inna Villasenor, RN                          /76 mmHg  Pulse 81  Temp(Src) 98.1  F (36.7  C) (Oral)  Wt 114.805 kg (253 lb 1.6 oz)  SpO2 98%

## 2017-01-18 NOTE — PATIENT INSTRUCTIONS
Dear Camacho Bhagat    Thank you for choosing [facility] ([facility]) for your procedure.  The following information is a summary of our appointment as well as important reminders.        DISCHARGE INSTRUCTIONS FOLLOWING ABDOMINAL PARACENTESIS    After you go home:    No strenuous activity for 24 hours    Resume your regular diet    Limit fluid intake for the first 48 hours to no more than 2 quarts per day.  There should be minimal drainage from the needle site.  If drainage does occur and soaks through the bandage, apply gentle pressure with your hand for 5 minutes.    Notify MD for the following:    Excessive drainage    Excessive swelling, redness or tenderness at the needle site    Fever greater than 101 degrees F    Dizziness or light-headedness when getting up or walking      IF THIS IS A MEDICAL EMERGENCY, CALL 132    If you have any questions or concerns    Contact the Hepatology Clinic:   [phone]    If you are a post-transplant patient, contact the Transplant Office:  [phone]    If this is after hours, contact the hospital :  [phone] and as to have the GI resident on call paged                     Additional information: you had a paracentesis performed today.      We look forward in seeing you on your next appointment here at [facility].  Please don t hesitate to call us at [phone] to reschedule any of your appointments or to speak with one of the [facility] registered nurses.  It was a pleasure taking care of you today.    Sincerely,  Inna Villasenor RN  [facility]  [facility]  [address] [place] [address]  [id number]  Phone:  [phone]

## 2017-01-19 NOTE — PROGRESS NOTES
Procedure date: 1/18/2017:    1. Ultrasound guided therapeutic paracentesis (CSC).    Preop diagnosis: Ascites.    Postop diagnosis: Same.    Proceduralist: Miguel Desai PA-C    Assist: None.    Attending on duty: Los Borrero MD    Medications: No intravenous sedation was administered. 1% buffered lidocaine, 10 cc.  50 grams 25% albumin IV.     Nursing: The patient was placed on continuous monitoring. The patient remained stable throughout the procedure.    PROCEDURE: The patient understood the limitations, alternatives, and risks of the procedure and requested the procedure be performed. Both written and oral consent were obtained.    The patient was identified by name and date of birth, and placed in the recumbent position. The right lower quadrant was prepped and draped in the usual sterile fashion. Ultrasound evaluation revealed a large fluid collection at this location, and an image was saved. The site overlying the fluid was anesthetized with 1% buffered lidocaine. Under ultrasound guidance, a 5 Hong Konger, 15 cm. Pigtail Yueh catheter was advanced into the abdominal space, and an image was saved. Syringe aspiration revealed initial return of clear yellow fluid. Catheter to vacuum drainage, with 6800 cc ascites aspirated. Repeat ultrasound revealed no significant ascites remaining. Catheter removed. Sterile dressing applied. Images were saved throughout the procedure. Procedure was well tolerated, with no immediate complications.    Estimate blood loss: Less than 1 cc.    Specimens: None.    IMPRESSION: Ultrasound guided therapeutic paracentesis. Total of  6800 cc of clear yellow ascites aspirated.    PLAN: Followup per primary team.

## 2017-01-20 ENCOUNTER — OFFICE VISIT (OUTPATIENT)
Dept: INFUSION THERAPY | Facility: CLINIC | Age: 52
End: 2017-01-20
Attending: NURSE PRACTITIONER
Payer: COMMERCIAL

## 2017-01-20 ENCOUNTER — RADIANT APPOINTMENT (OUTPATIENT)
Dept: ULTRASOUND IMAGING | Facility: CLINIC | Age: 52
End: 2017-01-20
Attending: NURSE PRACTITIONER
Payer: COMMERCIAL

## 2017-01-20 VITALS
WEIGHT: 247.6 LBS | HEART RATE: 81 BPM | SYSTOLIC BLOOD PRESSURE: 142 MMHG | DIASTOLIC BLOOD PRESSURE: 68 MMHG | BODY MASS INDEX: 33.57 KG/M2

## 2017-01-20 DIAGNOSIS — K74.60 HEPATIC CIRRHOSIS, UNSPECIFIED HEPATIC CIRRHOSIS TYPE (H): Primary | ICD-10-CM

## 2017-01-20 PROCEDURE — 96365 THER/PROPH/DIAG IV INF INIT: CPT | Mod: ZF

## 2017-01-20 PROCEDURE — 27210190 US PARACENTESIS

## 2017-01-20 PROCEDURE — 27210995 ZZH RX 272: Mod: ZF | Performed by: NURSE PRACTITIONER

## 2017-01-20 PROCEDURE — P9047 ALBUMIN (HUMAN), 25%, 50ML: HCPCS | Mod: ZF | Performed by: NURSE PRACTITIONER

## 2017-01-20 PROCEDURE — 25000128 H RX IP 250 OP 636: Mod: ZF | Performed by: NURSE PRACTITIONER

## 2017-01-20 RX ORDER — ALBUMIN (HUMAN) 12.5 G/50ML
12.5 SOLUTION INTRAVENOUS 4 TIMES DAILY PRN
Status: DISCONTINUED | OUTPATIENT
Start: 2017-01-20 | End: 2017-01-20 | Stop reason: HOSPADM

## 2017-01-20 RX ADMIN — LIDOCAINE HYDROCHLORIDE 20 ML: 10 INJECTION, SOLUTION INFILTRATION; PERINEURAL at 12:40

## 2017-01-20 RX ADMIN — ALBUMIN HUMAN 12.5 G: 0.25 SOLUTION INTRAVENOUS at 12:49

## 2017-01-20 RX ADMIN — ALBUMIN HUMAN 12.5 G: 0.25 SOLUTION INTRAVENOUS at 12:55

## 2017-01-20 RX ADMIN — ALBUMIN HUMAN 12.5 G: 0.25 SOLUTION INTRAVENOUS at 13:06

## 2017-01-20 NOTE — PROGRESS NOTES
Paracentesis Nursing Note  Camacho Bhagat presents today to [facility] for a [facility]esis.    During today's appointment orders from Toñito Camejo MD and Abigail Robison CNP were completed.    Progress Note:  Patient identification verified by name and date of birth.  Assessment completed.  Vitals monitored throughout appointment and recorded in Doc Flowsheets.  See proceduralist note in ultrasound.    Date of consent or authorization: 1/3/17.  Invasive Procedure Safety Checklist was completed and sent for scanning.     Paracentesis performed by Mitch Dinh PA-C Radiology.    The following labs were communicated to provider performing paracentesis:  PLT       59   12/27/2016    Total amount of ascites fluid drained: 3.5 liters.  Color of ascites fluid: yellow.  Total amount of albumin given: 37.5  grams.    Patient tolerated procedure well.    Post procedure,denies pain or discomfort post paracentesis.      Discharge Plan:  Discharge instructions were reviewed with patient.  Patient/Representative verbalized understanding and all questions were answered.   Discharged from [facility] in stable condition.    Ninfa Hope RN    Administrations This Visit     albumin human 25 % injection 12.5 g     Admin Date Action Dose Route Administered By             01/20/2017 New Bag 12.5 g Intravenous Ninfa Hope RN              Admin Date Action Dose Route Administered By             01/20/2017 New Bag 12.5 g Intravenous Ninfa Hope RN              Admin Date Action Dose Route Administered By             01/20/2017 New Bag 12.5 g Intravenous Ninfa Hope RN                    lidocaine BUFFERED 1 % injection 20 mL     Admin Date Action Dose Route Administered By             01/20/2017 Given 20 mL Injection Ninfa Hope RN                          /68 mmHg  Pulse 81  Wt 112.311 kg (247 lb 9.6 oz)

## 2017-01-23 ENCOUNTER — OFFICE VISIT (OUTPATIENT)
Dept: GASTROENTEROLOGY | Facility: CLINIC | Age: 52
End: 2017-01-23
Attending: INTERNAL MEDICINE
Payer: COMMERCIAL

## 2017-01-23 VITALS
BODY MASS INDEX: 33.81 KG/M2 | TEMPERATURE: 97.9 F | HEART RATE: 77 BPM | DIASTOLIC BLOOD PRESSURE: 82 MMHG | HEIGHT: 72 IN | WEIGHT: 249.6 LBS | SYSTOLIC BLOOD PRESSURE: 151 MMHG

## 2017-01-23 DIAGNOSIS — R17 JAUNDICE: Primary | ICD-10-CM

## 2017-01-23 PROCEDURE — 99212 OFFICE O/P EST SF 10 MIN: CPT | Mod: ZF

## 2017-01-23 ASSESSMENT — PAIN SCALES - GENERAL: PAINLEVEL: NO PAIN (0)

## 2017-01-23 NOTE — Clinical Note
1/23/2017       RE: Camacho Bhagat  [address] [facility] [id number]     Dear Colleague,    Thank you for referring your patient, Camacho Bhagat, to the [facility] at [facility]. Please see a copy of my visit note below.    HISTORY OF PRESENT ILLNESS:  I had the pleasure of seeing Camacho Bhagat for followup in the Liver Clinic at the [facility] on 01/23/2017.  Mr. 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  He is status post chemoembolization x2 and radioembolization x1.      He is not doing particularly well at this visit.  He has been requiring large volume paracenteses because of worsening ascites and most recently, his jaundice has been worsening.  It is very unclear why that is the case.      He denies any abdominal pain.  He has minimal itching.  He reports mild fatigue.  He does have an increased abdominal girth and is scheduled for a large volume paracentesis tomorrow.  He has just trace lower extremity edema.  He denies any fevers or chills, cough or shortness of breath.  He denies any nausea or vomiting and is having 3-4 bowel movements per day on his lactulose.  His appetite is diminished and he has been losing some muscle mass.     Current Outpatient Prescriptions   Medication     aspirin 325 MG tablet     lisinopril (PRINIVIL/ZESTRIL) 40 MG tablet     metFORMIN (GLUCOPHAGE) 500 MG tablet     MULTIPLE VITAMIN PO     pantoprazole (PROTONIX) 40 MG EC tablet     rifaximin (XIFAXAN) 550 MG TABS tablet     ondansetron (ZOFRAN-ODT) 4 MG ODT tab     glucagon 1 MG SOLR injection     insulin lispro (HUMALOG) 100 UNIT/ML injection     lactobacillus rhamnosus, GG, (CULTURELL) capsule     phenazopyridine (PYRIDIUM) 100 MG tablet     cyclobenzaprine (FLEXERIL) 10 MG tablet     oxybutynin (DITROPAN) 5 MG  tablet     gabapentin (NEURONTIN) 300 MG capsule     omeprazole (PRILOSEC) 20 MG capsule     phytonadione (MEPHYTON) 5 MG tablet     furosemide (LASIX) 40 MG tablet     insulin glargine (LANTUS) 100 UNIT/ML injection     No current facility-administered medications for this visit.     B/P: 151/82, T: 97.9, P: 77, R: Data Unavailable    HEENT exam shows moderate scleral icterus.  He has mild temporal muscle wasting.  His chest is clear.  His abdominal exam shows a moderate amount of ascites.  No masses or tenderness to palpation are present.  His liver is 10-11 cm in span with a prominent left lobe.  No spleen tip is palpable.  Extremity exam shows 1+ edema.  Skin exam shows no stigmata of chronic pruritus.  He does have some palmar erythema but no spider angioma.  Neurologic exam shows no asterixis.    Recent Results (from the past 168 hour(s))   AFP tumor marker    Collection Time: 01/24/17  2:09 PM   Result Value Ref Range    Alpha Fetoprotein  0 - 8 ug/L     <1.5  Assay Method:  Chemiluminescence using Siemens Centaur XP     INR    Collection Time: 01/24/17  2:09 PM   Result Value Ref Range    INR 1.48 (H) 0.86 - 1.14   CBC with platelets    Collection Time: 01/24/17  2:09 PM   Result Value Ref Range    WBC 4.5 4.0 - 11.0 10e9/L    RBC Count 3.31 (L) 4.4 - 5.9 10e12/L    Hemoglobin 11.5 (L) 13.3 - 17.7 g/dL    Hematocrit 32.7 (L) 40.0 - 53.0 %    MCV 99 78 - 100 fl    MCH 34.7 (H) 26.5 - 33.0 pg    MCHC 35.2 31.5 - 36.5 g/dL    RDW 15.0 10.0 - 15.0 %    Platelet Count 44 (LL) 150 - 450 10e9/L   Basic metabolic panel    Collection Time: 01/24/17  2:09 PM   Result Value Ref Range    Sodium 127 (L) 133 - 144 mmol/L    Potassium 5.9 (H) 3.4 - 5.3 mmol/L    Chloride 95 94 - 109 mmol/L    Carbon Dioxide 24 20 - 32 mmol/L    Anion Gap 8 3 - 14 mmol/L    Glucose 500 (H) 70 - 99 mg/dL    Urea Nitrogen 18 7 - 30 mg/dL    Creatinine 0.63 (L) 0.66 - 1.25 mg/dL    GFR Estimate >90  Non  GFR Calc   >60  mL/min/1.7m2    GFR Estimate If Black >90   GFR Calc   >60 mL/min/1.7m2    Calcium 8.6 8.5 - 10.1 mg/dL   Hepatic panel    Collection Time: 01/24/17  2:09 PM   Result Value Ref Range    Bilirubin Direct 7.4 (H) 0.0 - 0.2 mg/dL    Bilirubin Total 11.4 (H) 0.2 - 1.3 mg/dL    Albumin 3.0 (L) 3.4 - 5.0 g/dL    Protein Total 6.0 (L) 6.8 - 8.8 g/dL    Alkaline Phosphatase 316 (H) 40 - 150 U/L    ALT 64 0 - 70 U/L    AST 77 (H) 0 - 45 U/L   Reticulocyte count    Collection Time: 01/24/17  2:09 PM   Result Value Ref Range    % Retic 4.4 (H) 0.5 - 2.0 %    Absolute Retic 147.0 (H) 25 - 95 10e9/L   Haptoglobin    Collection Time: 01/24/17  2:09 PM   Result Value Ref Range    Haptoglobin <6 (L) 15 - 200 mg/dL   WBC Differential    Collection Time: 01/24/17  2:09 PM   Result Value Ref Range    Diff Method Automated Method     % Neutrophils 81.7 %    % Lymphocytes 5.1 %    % Monocytes 9.8 %    % Eosinophils 1.6 %    % Basophils 0.7 %    % Immature Granulocytes 1.1 %    Nucleated RBCs 0 0 /100    Absolute Neutrophil 3.7 1.6 - 8.3 10e9/L    Absolute Lymphocytes 0.2 (L) 0.8 - 5.3 10e9/L    Absolute Monocytes 0.4 0.0 - 1.3 10e9/L    Absolute Eosinophils 0.1 0.0 - 0.7 10e9/L    Absolute Basophils 0.0 0.0 - 0.2 10e9/L    Abs Immature Granulocytes 0.1 0 - 0.4 10e9/L    Absolute Nucleated RBC 0.0          My impression is that Mr. 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  Why exactly his liver function continues to deteriorate is unclear.  Whether it is related to the radioembolization is uncertain.  I did review his very recent MRI which certainly provides no account for why his liver function would be deteriorating.  His current MELD calculates to be 22 and he will be receiving an upgraded MELD of 28 here in a couple months.  He will continue with his repeated large volume paracentesis.  He will continue with his current dose of diuretics.  I will take  him off his vitamin K, which is certainly not helping at this point in time.        He is otherwise up-to-date with regard to variceal screening and vaccines.  I will see him back in the clinic again in 3 months.      Thank you very much for allowing me to participate in the care of this patient.  If you have any questions regarding my recommendations, please do not hesitate to contact me.       Toñito Camejo MD    Professor of Medicine  [employer]    Executive Medical Director, Solid Organ Transplant Program  [facility]

## 2017-01-23 NOTE — NURSING NOTE
Chief Complaint   Patient presents with     RECHECK     3 month folllow up       Initial /82 mmHg  Pulse 77  Temp(Src) 97.9  F (36.6  C) (Oral)  Ht 1.829 m (6')  Wt 113.218 kg (249 lb 9.6 oz)  BMI 33.84 kg/m2 Estimated body mass index is 33.84 kg/(m^2) as calculated from the following:    Height as of this encounter: 1.829 m (6').    Weight as of this encounter: 113.218 kg (249 lb 9.6 oz).  BP completed using cuff size: regular

## 2017-01-23 NOTE — PROGRESS NOTES
HISTORY OF PRESENT ILLNESS:  I had the pleasure of seeing Camacho Bhagat for followup in the Liver Clinic at the [facility] on 01/23/2017.  Mr. 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  He is status post chemoembolization x2 and radioembolization x1.      He is not doing particularly well at this visit.  He has been requiring large volume paracenteses because of worsening ascites and most recently, his jaundice has been worsening.  It is very unclear why that is the case.      He denies any abdominal pain.  He has minimal itching.  He reports mild fatigue.  He does have an increased abdominal girth and is scheduled for a large volume paracentesis tomorrow.  He has just trace lower extremity edema.  He denies any fevers or chills, cough or shortness of breath.  He denies any nausea or vomiting and is having 3-4 bowel movements per day on his lactulose.  His appetite is diminished and he has been losing some muscle mass.     Current Outpatient Prescriptions   Medication     aspirin 325 MG tablet     lisinopril (PRINIVIL/ZESTRIL) 40 MG tablet     metFORMIN (GLUCOPHAGE) 500 MG tablet     MULTIPLE VITAMIN PO     pantoprazole (PROTONIX) 40 MG EC tablet     rifaximin (XIFAXAN) 550 MG TABS tablet     ondansetron (ZOFRAN-ODT) 4 MG ODT tab     glucagon 1 MG SOLR injection     insulin lispro (HUMALOG) 100 UNIT/ML injection     lactobacillus rhamnosus, GG, (CULTURELL) capsule     phenazopyridine (PYRIDIUM) 100 MG tablet     cyclobenzaprine (FLEXERIL) 10 MG tablet     oxybutynin (DITROPAN) 5 MG tablet     gabapentin (NEURONTIN) 300 MG capsule     omeprazole (PRILOSEC) 20 MG capsule     phytonadione (MEPHYTON) 5 MG tablet     furosemide (LASIX) 40 MG tablet     insulin glargine (LANTUS) 100 UNIT/ML injection     No current facility-administered medications for this visit.      B/P: 151/82, T: 97.9, P: 77, R: Data Unavailable    HEENT exam shows moderate scleral icterus.  He has mild temporal muscle wasting.  His chest is clear.  His abdominal exam shows a moderate amount of ascites.  No masses or tenderness to palpation are present.  His liver is 10-11 cm in span with a prominent left lobe.  No spleen tip is palpable.  Extremity exam shows 1+ edema.  Skin exam shows no stigmata of chronic pruritus.  He does have some palmar erythema but no spider angioma.  Neurologic exam shows no asterixis.    Recent Results (from the past 168 hour(s))   AFP tumor marker    Collection Time: 01/24/17  2:09 PM   Result Value Ref Range    Alpha Fetoprotein  0 - 8 ug/L     <1.5  Assay Method:  Chemiluminescence using Siemens Centaur XP     INR    Collection Time: 01/24/17  2:09 PM   Result Value Ref Range    INR 1.48 (H) 0.86 - 1.14   CBC with platelets    Collection Time: 01/24/17  2:09 PM   Result Value Ref Range    WBC 4.5 4.0 - 11.0 10e9/L    RBC Count 3.31 (L) 4.4 - 5.9 10e12/L    Hemoglobin 11.5 (L) 13.3 - 17.7 g/dL    Hematocrit 32.7 (L) 40.0 - 53.0 %    MCV 99 78 - 100 fl    MCH 34.7 (H) 26.5 - 33.0 pg    MCHC 35.2 31.5 - 36.5 g/dL    RDW 15.0 10.0 - 15.0 %    Platelet Count 44 (LL) 150 - 450 10e9/L   Basic metabolic panel    Collection Time: 01/24/17  2:09 PM   Result Value Ref Range    Sodium 127 (L) 133 - 144 mmol/L    Potassium 5.9 (H) 3.4 - 5.3 mmol/L    Chloride 95 94 - 109 mmol/L    Carbon Dioxide 24 20 - 32 mmol/L    Anion Gap 8 3 - 14 mmol/L    Glucose 500 (H) 70 - 99 mg/dL    Urea Nitrogen 18 7 - 30 mg/dL    Creatinine 0.63 (L) 0.66 - 1.25 mg/dL    GFR Estimate >90  Non  GFR Calc   >60 mL/min/1.7m2    GFR Estimate If Black >90   GFR Calc   >60 mL/min/1.7m2    Calcium 8.6 8.5 - 10.1 mg/dL   Hepatic panel    Collection Time: 01/24/17  2:09 PM   Result Value Ref Range    Bilirubin Direct 7.4 (H) 0.0 - 0.2 mg/dL    Bilirubin Total 11.4 (H) 0.2 - 1.3 mg/dL     Albumin 3.0 (L) 3.4 - 5.0 g/dL    Protein Total 6.0 (L) 6.8 - 8.8 g/dL    Alkaline Phosphatase 316 (H) 40 - 150 U/L    ALT 64 0 - 70 U/L    AST 77 (H) 0 - 45 U/L   Reticulocyte count    Collection Time: 01/24/17  2:09 PM   Result Value Ref Range    % Retic 4.4 (H) 0.5 - 2.0 %    Absolute Retic 147.0 (H) 25 - 95 10e9/L   Haptoglobin    Collection Time: 01/24/17  2:09 PM   Result Value Ref Range    Haptoglobin <6 (L) 15 - 200 mg/dL   WBC Differential    Collection Time: 01/24/17  2:09 PM   Result Value Ref Range    Diff Method Automated Method     % Neutrophils 81.7 %    % Lymphocytes 5.1 %    % Monocytes 9.8 %    % Eosinophils 1.6 %    % Basophils 0.7 %    % Immature Granulocytes 1.1 %    Nucleated RBCs 0 0 /100    Absolute Neutrophil 3.7 1.6 - 8.3 10e9/L    Absolute Lymphocytes 0.2 (L) 0.8 - 5.3 10e9/L    Absolute Monocytes 0.4 0.0 - 1.3 10e9/L    Absolute Eosinophils 0.1 0.0 - 0.7 10e9/L    Absolute Basophils 0.0 0.0 - 0.2 10e9/L    Abs Immature Granulocytes 0.1 0 - 0.4 10e9/L    Absolute Nucleated RBC 0.0          My impression is that Mr. 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  Why exactly his liver function continues to deteriorate is unclear.  Whether it is related to the radioembolization is uncertain.  I did review his very recent MRI which certainly provides no account for why his liver function would be deteriorating.  His current MELD calculates to be 22 and he will be receiving an upgraded MELD of 28 here in a couple months.  He will continue with his repeated large volume paracentesis.  He will continue with his current dose of diuretics.  I will take him off his vitamin K, which is certainly not helping at this point in time.        He is otherwise up-to-date with regard to variceal screening and vaccines.  I will see him back in the clinic again in 3 months.      Thank you very much for allowing me to participate in the care of this  patient.  If you have any questions regarding my recommendations, please do not hesitate to contact me.       Toñito Camejo MD    Professor of Medicine  [employer]    Executive Medical Director, Solid Organ Transplant Program  [facility]

## 2017-01-24 ENCOUNTER — RADIANT APPOINTMENT (OUTPATIENT)
Dept: ULTRASOUND IMAGING | Facility: CLINIC | Age: 52
End: 2017-01-24
Attending: NURSE PRACTITIONER
Payer: COMMERCIAL

## 2017-01-24 ENCOUNTER — OFFICE VISIT (OUTPATIENT)
Dept: INFUSION THERAPY | Facility: CLINIC | Age: 52
End: 2017-01-24
Attending: NURSE PRACTITIONER
Payer: COMMERCIAL

## 2017-01-24 VITALS
BODY MASS INDEX: 32.31 KG/M2 | DIASTOLIC BLOOD PRESSURE: 67 MMHG | HEART RATE: 84 BPM | OXYGEN SATURATION: 99 % | WEIGHT: 238.3 LBS | SYSTOLIC BLOOD PRESSURE: 150 MMHG

## 2017-01-24 DIAGNOSIS — R18.8 CIRRHOSIS OF LIVER WITH ASCITES, UNSPECIFIED HEPATIC CIRRHOSIS TYPE (H): ICD-10-CM

## 2017-01-24 DIAGNOSIS — C22.0 HEPATOCELLULAR CARCINOMA (H): ICD-10-CM

## 2017-01-24 DIAGNOSIS — R17 JAUNDICE: ICD-10-CM

## 2017-01-24 DIAGNOSIS — K74.60 HEPATIC CIRRHOSIS, UNSPECIFIED HEPATIC CIRRHOSIS TYPE (H): Primary | ICD-10-CM

## 2017-01-24 DIAGNOSIS — K74.60 CIRRHOSIS OF LIVER (H): ICD-10-CM

## 2017-01-24 DIAGNOSIS — K74.60 CIRRHOSIS OF LIVER WITH ASCITES, UNSPECIFIED HEPATIC CIRRHOSIS TYPE (H): ICD-10-CM

## 2017-01-24 LAB
AFP SERPL-MCNC: NORMAL UG/L (ref 0–8)
ALBUMIN SERPL-MCNC: 3 G/DL (ref 3.4–5)
ALP SERPL-CCNC: 316 U/L (ref 40–150)
ALT SERPL W P-5'-P-CCNC: 64 U/L (ref 0–70)
ANION GAP SERPL CALCULATED.3IONS-SCNC: 8 MMOL/L (ref 3–14)
AST SERPL W P-5'-P-CCNC: 77 U/L (ref 0–45)
BASOPHILS # BLD AUTO: 0 10E9/L (ref 0–0.2)
BASOPHILS NFR BLD AUTO: 0.7 %
BILIRUB DIRECT SERPL-MCNC: 7.4 MG/DL (ref 0–0.2)
BILIRUB SERPL-MCNC: 11.4 MG/DL (ref 0.2–1.3)
BUN SERPL-MCNC: 18 MG/DL (ref 7–30)
CALCIUM SERPL-MCNC: 8.6 MG/DL (ref 8.5–10.1)
CHLORIDE SERPL-SCNC: 95 MMOL/L (ref 94–109)
CO2 SERPL-SCNC: 24 MMOL/L (ref 20–32)
CREAT SERPL-MCNC: 0.63 MG/DL (ref 0.66–1.25)
DIFFERENTIAL METHOD BLD: ABNORMAL
EOSINOPHIL # BLD AUTO: 0.1 10E9/L (ref 0–0.7)
EOSINOPHIL NFR BLD AUTO: 1.6 %
ERYTHROCYTE [DISTWIDTH] IN BLOOD BY AUTOMATED COUNT: 15 % (ref 10–15)
GFR SERPL CREATININE-BSD FRML MDRD: ABNORMAL ML/MIN/1.7M2
GLUCOSE SERPL-MCNC: 500 MG/DL (ref 70–99)
HCT VFR BLD AUTO: 32.7 % (ref 40–53)
HGB BLD-MCNC: 11.5 G/DL (ref 13.3–17.7)
IMM GRANULOCYTES # BLD: 0.1 10E9/L (ref 0–0.4)
IMM GRANULOCYTES NFR BLD: 1.1 %
INR PPP: 1.48 (ref 0.86–1.14)
LYMPHOCYTES # BLD AUTO: 0.2 10E9/L (ref 0.8–5.3)
LYMPHOCYTES NFR BLD AUTO: 5.1 %
MCH RBC QN AUTO: 34.7 PG (ref 26.5–33)
MCHC RBC AUTO-ENTMCNC: 35.2 G/DL (ref 31.5–36.5)
MCV RBC AUTO: 99 FL (ref 78–100)
MONOCYTES # BLD AUTO: 0.4 10E9/L (ref 0–1.3)
MONOCYTES NFR BLD AUTO: 9.8 %
NEUTROPHILS # BLD AUTO: 3.7 10E9/L (ref 1.6–8.3)
NEUTROPHILS NFR BLD AUTO: 81.7 %
NRBC # BLD AUTO: 0 10*3/UL
NRBC BLD AUTO-RTO: 0 /100
PLATELET # BLD AUTO: 44 10E9/L (ref 150–450)
POTASSIUM SERPL-SCNC: 5.9 MMOL/L (ref 3.4–5.3)
PROT SERPL-MCNC: 6 G/DL (ref 6.8–8.8)
RBC # BLD AUTO: 3.31 10E12/L (ref 4.4–5.9)
RETICS # AUTO: 147 10E9/L (ref 25–95)
RETICS/RBC NFR AUTO: 4.4 % (ref 0.5–2)
SODIUM SERPL-SCNC: 127 MMOL/L (ref 133–144)
WBC # BLD AUTO: 4.5 10E9/L (ref 4–11)

## 2017-01-24 PROCEDURE — 80076 HEPATIC FUNCTION PANEL: CPT | Performed by: INTERNAL MEDICINE

## 2017-01-24 PROCEDURE — 85004 AUTOMATED DIFF WBC COUNT: CPT | Performed by: INTERNAL MEDICINE

## 2017-01-24 PROCEDURE — 27210190 US PARACENTESIS

## 2017-01-24 PROCEDURE — 80048 BASIC METABOLIC PNL TOTAL CA: CPT | Performed by: INTERNAL MEDICINE

## 2017-01-24 PROCEDURE — 27210995 ZZH RX 272: Mod: ZF | Performed by: NURSE PRACTITIONER

## 2017-01-24 PROCEDURE — P9047 ALBUMIN (HUMAN), 25%, 50ML: HCPCS | Mod: ZF | Performed by: NURSE PRACTITIONER

## 2017-01-24 PROCEDURE — 82105 ALPHA-FETOPROTEIN SERUM: CPT | Performed by: INTERNAL MEDICINE

## 2017-01-24 PROCEDURE — 40000611 ZZHCL STATISTIC MORPHOLOGY W/INTERP HEMEPATH TC 85060: Performed by: INTERNAL MEDICINE

## 2017-01-24 PROCEDURE — 83010 ASSAY OF HAPTOGLOBIN QUANT: CPT | Performed by: INTERNAL MEDICINE

## 2017-01-24 PROCEDURE — 85027 COMPLETE CBC AUTOMATED: CPT | Performed by: INTERNAL MEDICINE

## 2017-01-24 PROCEDURE — 85610 PROTHROMBIN TIME: CPT | Performed by: INTERNAL MEDICINE

## 2017-01-24 PROCEDURE — 84295 ASSAY OF SERUM SODIUM: CPT | Performed by: INTERNAL MEDICINE

## 2017-01-24 PROCEDURE — 85045 AUTOMATED RETICULOCYTE COUNT: CPT | Performed by: INTERNAL MEDICINE

## 2017-01-24 PROCEDURE — 25000128 H RX IP 250 OP 636: Mod: ZF | Performed by: NURSE PRACTITIONER

## 2017-01-24 RX ORDER — ALBUMIN (HUMAN) 12.5 G/50ML
12.5 SOLUTION INTRAVENOUS 4 TIMES DAILY PRN
Status: DISCONTINUED | OUTPATIENT
Start: 2017-01-24 | End: 2017-01-24 | Stop reason: HOSPADM

## 2017-01-24 RX ADMIN — ALBUMIN HUMAN 50 G: 0.25 SOLUTION INTRAVENOUS at 14:46

## 2017-01-24 RX ADMIN — LIDOCAINE HYDROCHLORIDE 20 ML: 10 INJECTION, SOLUTION INFILTRATION; PERINEURAL at 14:46

## 2017-01-24 NOTE — PROGRESS NOTES
Paracentesis Nursing Note  Camacho Bhagat presents [facility] to [facility] for [facility].    During today's appointment orders from Abigail Robison were completed.    Progress Note:  Patient identification verified by name and date of birth.  Assessment completed.  Vitals monitored throughout appointment and recorded in Doc Flowsheets.  See proceduralist note in ultrasound.    Date of consent or authorization: 1/3/17.  Invasive Procedure Safety Checklist was completed and sent for scanning.     Paracentesis performed by Mitch Dinh PA-C Radiology.    The following labs were communicated to provider performing paracentesis:  PLT       59   12/27/2016    PLT        44   1/24/2017- MD Camejo notified via text page; no return call received    Total amount of ascites fluid drained: 5.6 liters.  Color of ascites fluid: yellow.  Total amount of albumin given: 50  grams.    Patient tolerated procedure well.    Post procedure,denies pain or discomfort post paracentesis.      Discharge Plan:  Discharge instructions were reviewed with patient.  Patient/Representative verbalized understanding and all questions were answered.   [facility] in stable condition.    Holli Jha RN  Administrations This Visit     albumin human 25 % injection 12.5 g     Admin Date Action Dose Route Administered By             01/24/2017 New Bag 50 g Intravenous Holli Jha RN                    lidocaine BUFFERED 1 % injection 20 mL     Admin Date Action Dose Route Administered By             01/24/2017 Given by Other Clinician 20 mL Injection Holli Jha RN                              /67 mmHg  Pulse 84  Wt 108.092 kg (238 lb 4.8 oz)  SpO2 99%

## 2017-01-24 NOTE — PROGRESS NOTES
Procedure date: 1/6/2017:    1. Ultrasound guided therapeutic paracentesis (CSC).    Preop diagnosis: Ascites.    Postop diagnosis: Same.    Proceduralist: Miguel Desai PA-C    Assist: None.    Attending on duty: Momo Cullen MD    Medications: No intravenous sedation was administered. 1% buffered lidocaine, 10 cc.  25 grams 25% albumin IV.     Nursing: The patient was placed on continuous monitoring. The patient remained stable throughout the procedure.    PROCEDURE: The patient understood the limitations, alternatives, and risks of the procedure and requested the procedure be performed. Both written and oral consent were obtained.    The patient was identified by name and date of birth, and placed in the recumbent position. The right lower quadrant was prepped and draped in the usual sterile fashion. Ultrasound evaluation revealed a moderate fluid collection at this location, and an image was saved. The site overlying the fluid was anesthetized with 1% buffered lidocaine. Under ultrasound guidance, a 5 Albanian, 15 cm. Pigtail Yueh catheter was advanced into the abdominal space, and an image was saved. Syringe aspiration revealed initial return of clear yellow fluid. Catheter to vacuum drainage, with 2600 cc ascites aspirated. Repeat ultrasound revealed no significant ascites remaining. Catheter removed. Sterile dressing applied. Images were saved throughout the procedure. Procedure was well tolerated, with no immediate complications.    Estimate blood loss: Less than 1 cc.    Specimens: None.    IMPRESSION: Ultrasound guided therapeutic paracentesis. Total of  2600 cc of clear yellow ascites aspirated.    PLAN: Followup per primary team.

## 2017-01-25 ENCOUNTER — MYC MEDICAL ADVICE (OUTPATIENT)
Dept: UROLOGY | Facility: CLINIC | Age: 52
End: 2017-01-25

## 2017-01-25 LAB
COPATH REPORT: NORMAL
HAPTOGLOB SERPL-MCNC: <6 MG/DL (ref 15–200)

## 2017-01-26 ENCOUNTER — MYC MEDICAL ADVICE (OUTPATIENT)
Dept: UROLOGY | Facility: CLINIC | Age: 52
End: 2017-01-26

## 2017-01-27 ENCOUNTER — OFFICE VISIT (OUTPATIENT)
Dept: INFUSION THERAPY | Facility: CLINIC | Age: 52
End: 2017-01-27
Attending: NURSE PRACTITIONER
Payer: COMMERCIAL

## 2017-01-27 ENCOUNTER — RADIANT APPOINTMENT (OUTPATIENT)
Dept: ULTRASOUND IMAGING | Facility: CLINIC | Age: 52
End: 2017-01-27
Attending: NURSE PRACTITIONER
Payer: COMMERCIAL

## 2017-01-27 ENCOUNTER — MYC MEDICAL ADVICE (OUTPATIENT)
Dept: UROLOGY | Facility: CLINIC | Age: 52
End: 2017-01-27

## 2017-01-27 VITALS
RESPIRATION RATE: 16 BRPM | TEMPERATURE: 98.5 F | SYSTOLIC BLOOD PRESSURE: 145 MMHG | BODY MASS INDEX: 31.89 KG/M2 | HEART RATE: 79 BPM | OXYGEN SATURATION: 98 % | WEIGHT: 235.2 LBS | DIASTOLIC BLOOD PRESSURE: 56 MMHG

## 2017-01-27 DIAGNOSIS — K74.60 HEPATIC CIRRHOSIS, UNSPECIFIED HEPATIC CIRRHOSIS TYPE (H): Primary | ICD-10-CM

## 2017-01-27 PROCEDURE — 27210995 ZZH RX 272: Mod: ZF | Performed by: NURSE PRACTITIONER

## 2017-01-27 PROCEDURE — P9047 ALBUMIN (HUMAN), 25%, 50ML: HCPCS | Mod: ZF | Performed by: NURSE PRACTITIONER

## 2017-01-27 PROCEDURE — 40000556 ZZH STATISTIC PERIPHERAL IV START W US GUIDANCE: Mod: ZF

## 2017-01-27 PROCEDURE — 27210190 US PARACENTESIS

## 2017-01-27 PROCEDURE — 25000128 H RX IP 250 OP 636: Mod: ZF | Performed by: NURSE PRACTITIONER

## 2017-01-27 RX ORDER — ALBUMIN (HUMAN) 12.5 G/50ML
12.5 SOLUTION INTRAVENOUS 4 TIMES DAILY PRN
Status: DISCONTINUED | OUTPATIENT
Start: 2017-01-27 | End: 2017-01-27 | Stop reason: HOSPADM

## 2017-01-27 RX ADMIN — LIDOCAINE HYDROCHLORIDE 20 ML: 10 INJECTION, SOLUTION INFILTRATION; PERINEURAL at 12:37

## 2017-01-27 RX ADMIN — ALBUMIN HUMAN 12.5 G: 0.25 SOLUTION INTRAVENOUS at 12:54

## 2017-01-27 RX ADMIN — ALBUMIN HUMAN 12.5 G: 0.25 SOLUTION INTRAVENOUS at 12:39

## 2017-01-27 RX ADMIN — ALBUMIN HUMAN 12.5 G: 0.25 SOLUTION INTRAVENOUS at 12:46

## 2017-01-27 RX ADMIN — ALBUMIN HUMAN 12.5 G: 0.25 SOLUTION INTRAVENOUS at 13:06

## 2017-01-27 NOTE — PROGRESS NOTES
Paracentesis Nursing Note  Camacho Bhagat presents [facility] for [facility].    During today's appointment orders from Abigail Robison were completed.    Progress Note:  Patient identification verified by name and date of birth.  Assessment completed.  Vitals monitored throughout appointment and recorded in Doc Flowsheets.  See proceduralist note in ultrasound.    Date of consent or authorization: 1/3/17.  Invasive Procedure Safety Checklist was completed and sent for scanning.     Paracentesis performed by Mir Rogers PA-C.    The following labs were communicated to provider performing paracentesis:  PLT       59   12/27/2016    PLT        44   1/24/2017    Total amount of ascites fluid drained: 5.2 liters.  Color of ascites fluid: yellow.  Total amount of albumin given:  grams.    Patient tolerated procedure well.    Post procedure,denies pain or discomfort post paracentesis.      Discharge Plan:  Discharge instructions were reviewed with patient.  Patient/Representative verbalized understanding and all questions were answered.   Discharged [facility] in stable condition.    Cassandra Templeton RN          /70 mmHg  Pulse 82  Temp(Src) 98.5  F (36.9  C) (Oral)  Resp 16  Wt 111.358 kg (245 lb 8 oz)  SpO2 98%

## 2017-01-27 NOTE — PATIENT INSTRUCTIONS
Dear Camacho Bhagat    Thank you for choosing [facility] ([facility]) for your procedure.  The following information is a summary of our appointment as well as important reminders.        DISCHARGE INSTRUCTIONS FOLLOWING ABDOMINAL PARACENTESIS    After you go home:    No strenuous activity for 24 hours    Resume your regular diet    Limit fluid intake for the first 48 hours to no more than 2 quarts per day.  There should be minimal drainage from the needle site.  If drainage does occur and soaks through the bandage, apply gentle pressure with your hand for 5 minutes.    Notify MD for the following:    Excessive drainage    Excessive swelling, redness or tenderness at the needle site    Fever greater than 101 degrees F    Dizziness or light-headedness when getting up or walking      IF THIS IS A MEDICAL EMERGENCY, CALL [phone]    If you have any questions or concerns    Contact the Hepatology Clinic:   [phone]    If you are a post-transplant patient, contact the Transplant Office:  [phone]    If this is after hours, contact the hospital :  [phone] and as to have the GI resident on call paged                     Additional information: you had a paracentesis performed today.      We look forward in seeing you on your next appointment here at [facility].  Please don t hesitate to call us at [phone] to reschedule any of your appointments or to speak with one of the [facility] registered nurses.  It was a pleasure taking care of you today.    Sincerely,  Cassandra Templeton, RN  [facility]  [facility]  [address] [place] [address]  [address]  Phone:  [phone]

## 2017-01-31 ENCOUNTER — RADIANT APPOINTMENT (OUTPATIENT)
Dept: ULTRASOUND IMAGING | Facility: CLINIC | Age: 52
End: 2017-01-31
Attending: NURSE PRACTITIONER
Payer: COMMERCIAL

## 2017-01-31 ENCOUNTER — OFFICE VISIT (OUTPATIENT)
Dept: INFUSION THERAPY | Facility: CLINIC | Age: 52
End: 2017-01-31
Attending: NURSE PRACTITIONER
Payer: COMMERCIAL

## 2017-01-31 VITALS
SYSTOLIC BLOOD PRESSURE: 156 MMHG | WEIGHT: 236.8 LBS | TEMPERATURE: 98.6 F | DIASTOLIC BLOOD PRESSURE: 63 MMHG | RESPIRATION RATE: 16 BRPM | BODY MASS INDEX: 32.11 KG/M2 | HEART RATE: 77 BPM | OXYGEN SATURATION: 100 %

## 2017-01-31 DIAGNOSIS — C22.0 HEPATOCELLULAR CARCINOMA (H): ICD-10-CM

## 2017-01-31 DIAGNOSIS — K74.60 HEPATIC CIRRHOSIS, UNSPECIFIED HEPATIC CIRRHOSIS TYPE (H): Primary | ICD-10-CM

## 2017-01-31 DIAGNOSIS — R18.8 CIRRHOSIS OF LIVER WITH ASCITES, UNSPECIFIED HEPATIC CIRRHOSIS TYPE (H): ICD-10-CM

## 2017-01-31 DIAGNOSIS — K74.60 CIRRHOSIS OF LIVER WITH ASCITES, UNSPECIFIED HEPATIC CIRRHOSIS TYPE (H): ICD-10-CM

## 2017-01-31 LAB
ALBUMIN SERPL-MCNC: 3.3 G/DL (ref 3.4–5)
BILIRUB SERPL-MCNC: 13.4 MG/DL (ref 0.2–1.3)
CREAT SERPL-MCNC: 0.67 MG/DL (ref 0.66–1.25)
GFR SERPL CREATININE-BSD FRML MDRD: NORMAL ML/MIN/1.7M2
INR PPP: 1.49 (ref 0.86–1.14)
SODIUM SERPL-SCNC: 127 MMOL/L (ref 133–144)

## 2017-01-31 PROCEDURE — P9047 ALBUMIN (HUMAN), 25%, 50ML: HCPCS | Mod: ZF | Performed by: NURSE PRACTITIONER

## 2017-01-31 PROCEDURE — 82247 BILIRUBIN TOTAL: CPT | Performed by: INTERNAL MEDICINE

## 2017-01-31 PROCEDURE — 25000128 H RX IP 250 OP 636: Mod: ZF | Performed by: NURSE PRACTITIONER

## 2017-01-31 PROCEDURE — 27210995 ZZH RX 272: Mod: ZF | Performed by: NURSE PRACTITIONER

## 2017-01-31 PROCEDURE — 82040 ASSAY OF SERUM ALBUMIN: CPT | Performed by: INTERNAL MEDICINE

## 2017-01-31 PROCEDURE — 84295 ASSAY OF SERUM SODIUM: CPT | Performed by: INTERNAL MEDICINE

## 2017-01-31 PROCEDURE — 82565 ASSAY OF CREATININE: CPT | Performed by: INTERNAL MEDICINE

## 2017-01-31 PROCEDURE — 27210190 US PARACENTESIS

## 2017-01-31 PROCEDURE — 85610 PROTHROMBIN TIME: CPT | Performed by: INTERNAL MEDICINE

## 2017-01-31 RX ORDER — ALBUMIN (HUMAN) 12.5 G/50ML
12.5 SOLUTION INTRAVENOUS 4 TIMES DAILY PRN
Status: DISCONTINUED | OUTPATIENT
Start: 2017-01-31 | End: 2017-01-31 | Stop reason: HOSPADM

## 2017-01-31 RX ADMIN — LIDOCAINE HYDROCHLORIDE 20 ML: 10 INJECTION, SOLUTION INFILTRATION; PERINEURAL at 14:49

## 2017-01-31 RX ADMIN — ALBUMIN HUMAN 50 G: 0.25 SOLUTION INTRAVENOUS at 14:49

## 2017-01-31 NOTE — PROGRESS NOTES
Paracentesis Nursing Note  Camacho Bhagat presents today to [facility] for a [facility]esis.    During today's appointment orders from LAMIN Castañeda CNP were completed.    Progress Note:  Patient identification verified by name and date of birth.  Assessment completed.  Vitals monitored throughout appointment and recorded in Doc Flowsheets.  See proceduralist note in ultrasound.    Date of consent or authorization: 1/3/2017.  Invasive Procedure Safety Checklist was completed and sent for scanning.     Paracentesis performed by Mitch Dinh PA-C Radiology.    The following labs were communicated to provider performing paracentesis:  PLT       44   1/24/2017    Total amount of ascites fluid drained: 5.3 liters.  Color of ascites fluid: yellow.  Total amount of albumin given: 50  grams.    Patient tolerated procedure well.    Post procedure,denies pain or discomfort post paracentesis.      Discharge Plan:  Discharge instructions were reviewed with patient.  Patient/Representative verbalized understanding and all questions were answered.   Discharged [facility] in stable condition.    Estefani Kirby RN    Administrations This Visit     albumin human 25 % injection 12.5 g     Admin Date Action Dose Route Administered By             01/31/2017 New Bag 50 g Intravenous Estefani Kirby RN                    lidocaine BUFFERED 1 % injection 20 mL     Admin Date Action Dose Route Administered By             01/31/2017 Given by Other 20 mL Injection Estefani Kirby RN                          /66 mmHg  Pulse 82  Temp(Src) 98.6  F (37  C) (Oral)  Resp 16  Wt 112.628 kg (248 lb 4.8 oz)  SpO2 100%

## 2017-01-31 NOTE — PATIENT INSTRUCTIONS
Dear Camacho Bhagat    Thank you for choosing [facility] ([facility]) for your procedure.  The following information is a summary of our appointment as well as important reminders.      Please refer to your hospital discharge instructions for details on home care services, future appointments, phone numbers, and diet/activity levels.    Additional information: Your paracentesis procedure today, beginning weight 248.3 lbs (112.6 kg), removed 5.3 liters ascites fluid, gave 50.0 grams albumin, ending weight 236.8 lb (107.4 kg).    We look forward in seeing you on your next appointment here at [facility].  Please don t hesitate to call us at [phone] to reschedule any of your appointments or to speak with one of the [facility] registered nurses.  It was a pleasure taking care of you today.    Sincerely,  Estefani Kirby RN  [facility]  [facility]  [address] [place] [address]  [address]  Phone:  [phone]  DISCHARGE INSTRUCTIONS FOLLOWING ABDOMINAL PARACENTESIS    After you go home:    No strenuous activity for 24 hours    Resume your regular diet    Limit fluid intake for the first 48 hours to no more than 2 quarts per day.  There should be minimal drainage from the needle site.  If drainage does occur and soaks through the bandage, apply gentle pressure with your hand for 5 minutes.    Notify MD for the following:    Excessive drainage    Excessive swelling, redness or tenderness at the needle site    Fever greater than 101 degrees F    Dizziness or light-headedness when getting up or walking      IF THIS IS A MEDICAL EMERGENCY, CALL [phone]    If you have any questions or concerns    Contact the Hepatology Clinic:   [phone]    If you are a post-transplant patient, contact the Transplant Office:  [phone]    If this is after hours, contact the hospital :  [phone] and as to have the GI  resident on call paged                   I have received and understand my discharge instructions and I have all of my personal belongings.          ------------------------------------------------------        ---------------------------------------------------    Patient / Significant Other's Signature     Relationship

## 2017-02-01 ENCOUNTER — TELEPHONE (OUTPATIENT)
Dept: TRANSPLANT | Facility: CLINIC | Age: 52
End: 2017-02-01

## 2017-02-01 DIAGNOSIS — C22.0 HEPATOCELLULAR CARCINOMA (H): ICD-10-CM

## 2017-02-01 DIAGNOSIS — G47.33 OBSTRUCTIVE SLEEP APNEA: ICD-10-CM

## 2017-02-01 DIAGNOSIS — E11.9 DIABETES MELLITUS, TYPE 2 (H): ICD-10-CM

## 2017-02-01 DIAGNOSIS — K74.60 CIRRHOSIS OF LIVER WITH ASCITES, UNSPECIFIED HEPATIC CIRRHOSIS TYPE (H): Primary | ICD-10-CM

## 2017-02-01 DIAGNOSIS — E66.9 OBESITY: ICD-10-CM

## 2017-02-01 DIAGNOSIS — R18.8 CIRRHOSIS OF LIVER WITH ASCITES, UNSPECIFIED HEPATIC CIRRHOSIS TYPE (H): Primary | ICD-10-CM

## 2017-02-01 DIAGNOSIS — Z76.82 AWAITING LIVER TRANSPLANT: ICD-10-CM

## 2017-02-01 NOTE — TELEPHONE ENCOUNTER
Received request to schedule this patient for Labs, DSE & Dr. Alston. Pt scheduled to see Dr. Alston at next available on 3/6/17. LVM for patient to return my call regarding scheduling appointments. Awaiting call back from patient.

## 2017-02-02 ENCOUNTER — TELEPHONE (OUTPATIENT)
Dept: TRANSPLANT | Facility: CLINIC | Age: 52
End: 2017-02-02

## 2017-02-02 NOTE — TELEPHONE ENCOUNTER
This patient is scheduled for a Dobutamine Stress Test on 2/8/17 @ 1300 and an appointment to see Dr. Alston on 3/6/17 @ 1600.  The patient is due to have labs drawn and has agreed to have them drawn during his paracentesis appointment on 2/3/17. All appointment information, including DSE Prep Istructions, have  been sent to the patient via Email per his request.

## 2017-02-02 NOTE — Clinical Note
Medical Appointments    Patient:   Camacho Bhagat  MR#:    [id number]  Coordinator:          Ralph Morales     [phone]  :     Estephania BROWN     [phone]  [facility]  Date(s):    February 8, 2017 & March 6, 2017      Day/Date:    Wednesday, February 8, 2017  Time Location Activity   1:00 p.m. [facility] Waiting Room  (2nd floor [facility]) Dobutamine Stress Echo = SEE ENCLOSED INSTRUCTIONS for TEST!           Day/Date:    Monday, March 6, 2017  Time Location Activity   4:00 p.m. [facility]  (3rd floor [facility] [place]) Appointment with Dr. Alston,  Cardiology           Day/Date:    Every Thursday *OPTIONAL*  Time Location Activity   12:00p.m. - 1:30p.m. [facility]  Hospital Station [facility]  Room 7-120 Every Thursday *OPTIONAL*     *IF ON LINE CHECK IN IS NOT DONE ,YOU WILL NEED TO CHECK IN 15 MINUTES EARLIER THEN THE FIRST SCHEDULED APPOINTMENT*

## 2017-02-03 ENCOUNTER — RADIANT APPOINTMENT (OUTPATIENT)
Dept: ULTRASOUND IMAGING | Facility: CLINIC | Age: 52
End: 2017-02-03
Attending: NURSE PRACTITIONER
Payer: COMMERCIAL

## 2017-02-03 ENCOUNTER — OFFICE VISIT (OUTPATIENT)
Dept: INFUSION THERAPY | Facility: CLINIC | Age: 52
End: 2017-02-03
Attending: NURSE PRACTITIONER
Payer: COMMERCIAL

## 2017-02-03 VITALS
BODY MASS INDEX: 31.93 KG/M2 | TEMPERATURE: 98.9 F | RESPIRATION RATE: 16 BRPM | SYSTOLIC BLOOD PRESSURE: 141 MMHG | HEART RATE: 76 BPM | WEIGHT: 235.5 LBS | DIASTOLIC BLOOD PRESSURE: 55 MMHG

## 2017-02-03 DIAGNOSIS — K74.60 HEPATIC CIRRHOSIS, UNSPECIFIED HEPATIC CIRRHOSIS TYPE (H): Primary | ICD-10-CM

## 2017-02-03 LAB
AFP SERPL-MCNC: NORMAL UG/L (ref 0–8)
ALBUMIN SERPL-MCNC: 3.5 G/DL (ref 3.4–5)
ALP SERPL-CCNC: 267 U/L (ref 40–150)
ALT SERPL W P-5'-P-CCNC: 63 U/L (ref 0–70)
ANION GAP SERPL CALCULATED.3IONS-SCNC: 13 MMOL/L (ref 3–14)
AST SERPL W P-5'-P-CCNC: 79 U/L (ref 0–45)
BILIRUB DIRECT SERPL-MCNC: 7.7 MG/DL (ref 0–0.2)
BILIRUB SERPL-MCNC: 12.8 MG/DL (ref 0.2–1.3)
BUN SERPL-MCNC: 14 MG/DL (ref 7–30)
CALCIUM SERPL-MCNC: 8.8 MG/DL (ref 8.5–10.1)
CHLORIDE SERPL-SCNC: 94 MMOL/L (ref 94–109)
CO2 SERPL-SCNC: 19 MMOL/L (ref 20–32)
CREAT SERPL-MCNC: 0.58 MG/DL (ref 0.66–1.25)
ERYTHROCYTE [DISTWIDTH] IN BLOOD BY AUTOMATED COUNT: 14.9 % (ref 10–15)
GFR SERPL CREATININE-BSD FRML MDRD: ABNORMAL ML/MIN/1.7M2
GLUCOSE SERPL-MCNC: 454 MG/DL (ref 70–99)
HCT VFR BLD AUTO: 30.1 % (ref 40–53)
HGB BLD-MCNC: 10.9 G/DL (ref 13.3–17.7)
INR PPP: 1.68 (ref 0.86–1.14)
MCH RBC QN AUTO: 35.6 PG (ref 26.5–33)
MCHC RBC AUTO-ENTMCNC: 36.2 G/DL (ref 31.5–36.5)
MCV RBC AUTO: 98 FL (ref 78–100)
PLATELET # BLD AUTO: 35 10E9/L (ref 150–450)
POTASSIUM SERPL-SCNC: 5.4 MMOL/L (ref 3.4–5.3)
PROT SERPL-MCNC: 6 G/DL (ref 6.8–8.8)
RBC # BLD AUTO: 3.06 10E12/L (ref 4.4–5.9)
SODIUM SERPL-SCNC: 126 MMOL/L (ref 133–144)
WBC # BLD AUTO: 3.8 10E9/L (ref 4–11)

## 2017-02-03 PROCEDURE — P9047 ALBUMIN (HUMAN), 25%, 50ML: HCPCS | Mod: ZF | Performed by: NURSE PRACTITIONER

## 2017-02-03 PROCEDURE — 25000128 H RX IP 250 OP 636: Mod: ZF | Performed by: NURSE PRACTITIONER

## 2017-02-03 PROCEDURE — 27210995 ZZH RX 272: Mod: ZF | Performed by: NURSE PRACTITIONER

## 2017-02-03 PROCEDURE — 80076 HEPATIC FUNCTION PANEL: CPT | Performed by: INTERNAL MEDICINE

## 2017-02-03 PROCEDURE — 85027 COMPLETE CBC AUTOMATED: CPT | Performed by: INTERNAL MEDICINE

## 2017-02-03 PROCEDURE — 82105 ALPHA-FETOPROTEIN SERUM: CPT | Performed by: INTERNAL MEDICINE

## 2017-02-03 PROCEDURE — 40000141 ZZH STATISTIC PERIPHERAL IV START W/O US GUIDANCE: Mod: ZF

## 2017-02-03 PROCEDURE — 80048 BASIC METABOLIC PNL TOTAL CA: CPT | Performed by: INTERNAL MEDICINE

## 2017-02-03 PROCEDURE — 85610 PROTHROMBIN TIME: CPT | Performed by: INTERNAL MEDICINE

## 2017-02-03 PROCEDURE — 27210190 US PARACENTESIS

## 2017-02-03 RX ORDER — ALBUMIN (HUMAN) 12.5 G/50ML
12.5 SOLUTION INTRAVENOUS 4 TIMES DAILY PRN
Status: DISCONTINUED | OUTPATIENT
Start: 2017-02-03 | End: 2017-02-03 | Stop reason: HOSPADM

## 2017-02-03 RX ADMIN — ALBUMIN HUMAN 12.5 G: 0.25 SOLUTION INTRAVENOUS at 12:56

## 2017-02-03 RX ADMIN — ALBUMIN HUMAN 12.5 G: 0.25 SOLUTION INTRAVENOUS at 13:06

## 2017-02-03 RX ADMIN — ALBUMIN HUMAN 12.5 G: 0.25 SOLUTION INTRAVENOUS at 13:24

## 2017-02-03 RX ADMIN — ALBUMIN HUMAN 12.5 G: 0.25 SOLUTION INTRAVENOUS at 13:15

## 2017-02-03 RX ADMIN — LIDOCAINE HYDROCHLORIDE 20 ML: 10 INJECTION, SOLUTION INFILTRATION; PERINEURAL at 12:56

## 2017-02-03 NOTE — PROGRESS NOTES
Paracentesis Nursing Note  Camacho TERESA Bhagat presents today to [facility] for a [facility].    During today's appointment orders from Abigail Robison CNP were completed.    Progress Note:  Patient identification verified by name and date of birth.  Assessment completed.  Vitals monitored throughout appointment and recorded in Doc Flowsheets.  See proceduralist note in ultrasound.    Date of consent or authorization: 2/3/2017.  Invasive Procedure Safety Checklist was completed and sent for scanning.     Paracentesis performed by Camacho Dinh PA-C Radiology.    The following labs were communicated to provider performing paracentesis:  PLT       44   1/24/2017    Total amount of ascites fluid drained: 5.6 liters.  Color of ascites fluid: hardik.  Total amount of albumin given: 50  grams.    Patient tolerated procedure well.    Post procedure,denies pain or discomfort post paracentesis.      Discharge Plan:  Discharge instructions were reviewed with patient. Declined AVS.  Patient/Representative verbalized understanding and all questions were answered.   Discharged from [facility] in stable condition.    Gisselle Irizarry RN        Temp([facility]) 98.9  F (37.2  C) (Oral)  Resp 16  Wt 112.175 kg (247 lb 4.8 oz)

## 2017-02-03 NOTE — PATIENT INSTRUCTIONS
Dear Camacho Bhagat    Thank you for choosing [facility] ([facility]) for your procedure.  The following information is a summary of our appointment as well as important reminders.        DISCHARGE INSTRUCTIONS FOLLOWING ABDOMINAL PARACENTESIS    After you go home:    No strenuous activity for 24 hours    Resume your regular diet    Limit fluid intake for the first 48 hours to no more than 2 quarts per day.  There should be minimal drainage from the needle site.  If drainage does occur and soaks through the bandage, apply gentle pressure with your hand for 5 minutes.    Notify MD for the following:    Excessive drainage    Excessive swelling, redness or tenderness at the needle site    Fever greater than 101 degrees F    Dizziness or light-headedness when getting up or walking      IF THIS IS A MEDICAL EMERGENCY, CALL [phone]    If you have any questions or concerns    Contact the Hepatology Clinic:   [phone]    If you are a post-transplant patient, contact the Transplant Office:  [phone]    If this is after hours, contact the hospital :  [phone] and as to have the GI resident on call paged                   I have received and understand my discharge instructions and I have all of my personal belongings.          ------------------------------------------------------        ---------------------------------------------------    Patient / Significant Other's Signature     Relationship      We look forward in seeing you on your next appointment here at [facility].  Please don t hesitate to call us at [phone] to reschedule any of your appointments or to speak with one of the [facility] registered nurses.  It was a pleasure taking care of you today.    Sincerely,  Gisselle Irizarry, RN  [facility]  [facility]  [address] [place]  [address]  Phone:  [phone]

## 2017-02-07 ENCOUNTER — TELEPHONE (OUTPATIENT)
Dept: GASTROENTEROLOGY | Facility: CLINIC | Age: 52
End: 2017-02-07

## 2017-02-07 ENCOUNTER — OFFICE VISIT (OUTPATIENT)
Dept: INFUSION THERAPY | Facility: CLINIC | Age: 52
End: 2017-02-07
Attending: NURSE PRACTITIONER
Payer: COMMERCIAL

## 2017-02-07 ENCOUNTER — RADIANT APPOINTMENT (OUTPATIENT)
Dept: ULTRASOUND IMAGING | Facility: CLINIC | Age: 52
End: 2017-02-07
Attending: NURSE PRACTITIONER
Payer: COMMERCIAL

## 2017-02-07 VITALS
HEART RATE: 85 BPM | DIASTOLIC BLOOD PRESSURE: 61 MMHG | OXYGEN SATURATION: 100 % | BODY MASS INDEX: 31.76 KG/M2 | TEMPERATURE: 97.9 F | SYSTOLIC BLOOD PRESSURE: 148 MMHG | WEIGHT: 234.2 LBS

## 2017-02-07 DIAGNOSIS — K74.60 HEPATIC CIRRHOSIS, UNSPECIFIED HEPATIC CIRRHOSIS TYPE (H): Primary | ICD-10-CM

## 2017-02-07 DIAGNOSIS — C22.0 HEPATOCELLULAR CARCINOMA (H): ICD-10-CM

## 2017-02-07 DIAGNOSIS — K74.60 CIRRHOSIS OF LIVER WITH ASCITES, UNSPECIFIED HEPATIC CIRRHOSIS TYPE (H): ICD-10-CM

## 2017-02-07 DIAGNOSIS — R18.8 CIRRHOSIS OF LIVER WITH ASCITES, UNSPECIFIED HEPATIC CIRRHOSIS TYPE (H): ICD-10-CM

## 2017-02-07 LAB
ALBUMIN SERPL-MCNC: 3.3 G/DL (ref 3.4–5)
BILIRUB SERPL-MCNC: 18.1 MG/DL (ref 0.2–1.3)
CREAT SERPL-MCNC: 0.72 MG/DL (ref 0.66–1.25)
GFR SERPL CREATININE-BSD FRML MDRD: NORMAL ML/MIN/1.7M2
INR PPP: 1.52 (ref 0.86–1.14)
SODIUM SERPL-SCNC: 124 MMOL/L (ref 133–144)

## 2017-02-07 PROCEDURE — 82565 ASSAY OF CREATININE: CPT | Performed by: INTERNAL MEDICINE

## 2017-02-07 PROCEDURE — 27210995 ZZH RX 272: Mod: ZF | Performed by: NURSE PRACTITIONER

## 2017-02-07 PROCEDURE — 84295 ASSAY OF SERUM SODIUM: CPT | Performed by: INTERNAL MEDICINE

## 2017-02-07 PROCEDURE — 82247 BILIRUBIN TOTAL: CPT | Performed by: INTERNAL MEDICINE

## 2017-02-07 PROCEDURE — 25000128 H RX IP 250 OP 636: Mod: ZF | Performed by: NURSE PRACTITIONER

## 2017-02-07 PROCEDURE — P9047 ALBUMIN (HUMAN), 25%, 50ML: HCPCS | Mod: ZF | Performed by: NURSE PRACTITIONER

## 2017-02-07 PROCEDURE — 27210190 US PARACENTESIS

## 2017-02-07 PROCEDURE — 85610 PROTHROMBIN TIME: CPT | Performed by: INTERNAL MEDICINE

## 2017-02-07 PROCEDURE — 82040 ASSAY OF SERUM ALBUMIN: CPT | Performed by: INTERNAL MEDICINE

## 2017-02-07 RX ORDER — SPIRONOLACTONE 50 MG/1
50 TABLET, FILM COATED ORAL DAILY
Qty: 90 TABLET | Refills: 3 | Status: ON HOLD | OUTPATIENT
Start: 2017-02-07 | End: 2017-03-10

## 2017-02-07 RX ORDER — ALBUMIN (HUMAN) 12.5 G/50ML
12.5 SOLUTION INTRAVENOUS 4 TIMES DAILY PRN
Status: DISCONTINUED | OUTPATIENT
Start: 2017-02-07 | End: 2017-02-07 | Stop reason: HOSPADM

## 2017-02-07 RX ADMIN — ALBUMIN HUMAN 12.5 G: 0.25 SOLUTION INTRAVENOUS at 15:35

## 2017-02-07 RX ADMIN — ALBUMIN HUMAN 12.5 G: 0.25 SOLUTION INTRAVENOUS at 15:24

## 2017-02-07 RX ADMIN — LIDOCAINE HYDROCHLORIDE 20 ML: 10 INJECTION, SOLUTION INFILTRATION; PERINEURAL at 15:09

## 2017-02-07 RX ADMIN — ALBUMIN HUMAN 12.5 G: 0.25 SOLUTION INTRAVENOUS at 15:16

## 2017-02-07 RX ADMIN — ALBUMIN HUMAN 12.5 G: 0.25 SOLUTION INTRAVENOUS at 15:09

## 2017-02-07 NOTE — PROGRESS NOTES
DATE:  2/7/2017   TIME OF RECEIPT FROM LAB:  1501  LAB TEST:  Total bili 18.1    RESULTS GIVEN WITH READ-BACK TO (PROVIDER):  Ericka Severson R.N.  TIME LAB VALUE REPORTED TO PROVIDER:   2475

## 2017-02-07 NOTE — PROGRESS NOTES
Procedure date: 2/7/2017:    1. Ultrasound guided therapeutic paracentesis (CSC).    Preop diagnosis: Ascites.    Postop diagnosis: Same.    Proceduralist: Miguel Desai PA-C    Assist: None.    Attending on duty: Jersey Bernardo MD    Medications: No intravenous sedation was administered. 1% buffered lidocaine, 10 cc.  50 grams 25% albumin IV.     Nursing: The patient was placed on continuous monitoring. The patient remained stable throughout the procedure.    PROCEDURE: The patient understood the limitations, alternatives, and risks of the procedure and requested the procedure be performed. Both written and oral consent were obtained.    The patient was identified by name and date of birth, and placed in the recumbent position. The left lower quadrant was prepped and draped in the usual sterile fashion. Ultrasound evaluation revealed a large fluid collection at this location, and an image was saved. Color ultrasound was used to assess the subcutaneous tissues. No vessels were identified in the region of planned puncture. The site overlying the fluid was anesthetized with 1% buffered lidocaine. Under ultrasound guidance, a 5 Romanian, 15 cm. Pigtail Yueh catheter was advanced into the abdominal space, and an image was saved. Syringe aspiration revealed initial return of clear serous fluid. Catheter to vacuum drainage, with 5400 cc ascites aspirated. Repeat ultrasound revealed no significant ascites remaining. Catheter removed. Sterile dressing applied. Images were saved throughout the procedure. Procedure was well tolerated, with no immediate complications.    Estimate blood loss: Less than 1 cc.    Specimens: None.    IMPRESSION: Ultrasound guided therapeutic paracentesis. Total of  5400 cc of clear serous ascites aspirated.    PLAN: Followup per primary team.    Attestation: The physician assistant (PA) who performed this procedure and signed the above report is licensed to practice in the [facility] pursuant to  [place] Statute 147A.09.  This includes meeting the Statute and [place] Board of [employer] requirement of an active Delegation Agreement, which documents delegation of services by primary and alternate supervising physicians. All services rendered are performed under a collaborative agreement with Dr. Theodore Oates, Director of Interventional Radiology, [facility].

## 2017-02-07 NOTE — PROGRESS NOTES
Paracentesis Nursing Note  Camacho Bhagat presents today to [facility] for [facility].    During today's appointment orders from Abgiail Robison NP were completed.    Progress Note:  Patient identification verified by name and date of birth.  Assessment completed.  Vitals monitored throughout appointment and recorded in Doc Flowsheets.  See proceduralist note in ultrasound.    Date of consent or authorization: 2/3/17.  Invasive Procedure Safety Checklist was completed and sent for scanning.     Paracentesis performed by Jesse Desai PA-C Radiology.    The following labs were communicated to provider performing paracentesis:  PLT       35   2/3/2017    Total amount of ascites fluid drained: 5.4 liters.  Color of ascites fluid: dark yellow.  Total amount of albumin given: 50  grams.    Patient tolerated procedure well.     Post procedure,denies pain or discomfort post paracentesis.     Patient with elevated bilirubin up to 18.1 today. Dr. Camejo and Abigail Robison notified via paging system. No call back received.      Discharge Plan:  Discharge instructions were reviewed with patient. Declined AVS print out.  Patient/Representative verbalized understanding and all questions were answered.    Discharged from [facility] in stable condition.    Ericka Severson, RN       Administrations This Visit     albumin human 25 % injection 12.5 g     Admin Date Action Dose Route Administered By             02/07/2017 New Bag 12.5 g Intravenous Severson, Ericka, RN              Admin Date Action Dose Route Administered By             02/07/2017 New Bag 12.5 g Intravenous Severson, Ericka, RN              Admin Date Action Dose Route Administered By             02/07/2017 New Bag 12.5 g Intravenous Severson, Ericka, RN                    lidocaine BUFFERED 1 % injection 20 mL     Admin Date Action Dose Route Administered By             02/07/2017 Given by Other  Clinician 20 mL Injection Severson, Ericka, RN                            /67 mmHg  Pulse 82  Temp(Src) 97.9  F (36.6  C) (Tympanic)  Wt 111.585 kg (246 lb)  SpO2 100%

## 2017-02-07 NOTE — TELEPHONE ENCOUNTER
----- Message from Toñito Camejo MD sent at 2/7/2017 12:27 PM CST -----  Regarding: RE: Spironolactone  Yes.    ----- Message -----     From: Kristen Merritt LPN     Sent: 2/7/2017  12:16 PM       To: Toñito Camejo MD  Subject: Spironolactone                                   I have a refill request for Spironolactone 50 mg, one tablet daily however I do not see it on his medication list.  Do you want me to refill.  Next OV is 4/24/17.

## 2017-02-08 ENCOUNTER — HOSPITAL ENCOUNTER (OUTPATIENT)
Dept: CARDIOLOGY | Facility: CLINIC | Age: 52
Discharge: HOME | End: 2017-02-08
Attending: INTERNAL MEDICINE | Admitting: INTERNAL MEDICINE
Payer: COMMERCIAL

## 2017-02-08 DIAGNOSIS — Z76.82 AWAITING LIVER TRANSPLANT: ICD-10-CM

## 2017-02-08 DIAGNOSIS — C22.0 HEPATOCELLULAR CARCINOMA (H): ICD-10-CM

## 2017-02-08 DIAGNOSIS — G47.33 OBSTRUCTIVE SLEEP APNEA: ICD-10-CM

## 2017-02-08 DIAGNOSIS — K74.60 CIRRHOSIS OF LIVER WITH ASCITES, UNSPECIFIED HEPATIC CIRRHOSIS TYPE (H): ICD-10-CM

## 2017-02-08 DIAGNOSIS — E66.9 OBESITY: ICD-10-CM

## 2017-02-08 DIAGNOSIS — R18.8 CIRRHOSIS OF LIVER WITH ASCITES, UNSPECIFIED HEPATIC CIRRHOSIS TYPE (H): ICD-10-CM

## 2017-02-08 DIAGNOSIS — E11.9 DIABETES MELLITUS, TYPE 2 (H): ICD-10-CM

## 2017-02-08 PROCEDURE — 40000264 ECHO STRESS DOBUTAMINE WITH OPTISON

## 2017-02-08 PROCEDURE — 93325 DOPPLER ECHO COLOR FLOW MAPG: CPT | Mod: 26 | Performed by: INTERNAL MEDICINE

## 2017-02-08 PROCEDURE — 25000125 ZZHC RX 250: Performed by: INTERNAL MEDICINE

## 2017-02-08 PROCEDURE — 93018 CV STRESS TEST I&R ONLY: CPT | Performed by: INTERNAL MEDICINE

## 2017-02-08 PROCEDURE — 93016 CV STRESS TEST SUPVJ ONLY: CPT | Performed by: INTERNAL MEDICINE

## 2017-02-08 PROCEDURE — 25500064 ZZH RX 255 OP 636: Performed by: INTERNAL MEDICINE

## 2017-02-08 PROCEDURE — 93350 STRESS TTE ONLY: CPT | Mod: 26 | Performed by: INTERNAL MEDICINE

## 2017-02-08 PROCEDURE — 93321 DOPPLER ECHO F-UP/LMTD STD: CPT | Mod: 26 | Performed by: INTERNAL MEDICINE

## 2017-02-08 PROCEDURE — 25000128 H RX IP 250 OP 636: Performed by: INTERNAL MEDICINE

## 2017-02-08 RX ORDER — DOBUTAMINE HYDROCHLORIDE 200 MG/100ML
5-40 INJECTION INTRAVENOUS CONTINUOUS PRN
Status: COMPLETED | OUTPATIENT
Start: 2017-02-08 | End: 2017-02-08

## 2017-02-08 RX ADMIN — HUMAN ALBUMIN MICROSPHERES AND PERFLUTREN 7 ML: 10; .22 INJECTION, SOLUTION INTRAVENOUS at 13:34

## 2017-02-08 RX ADMIN — METOPROLOL TARTRATE 4 MG: 5 INJECTION INTRAVENOUS at 13:33

## 2017-02-08 RX ADMIN — ATROPINE SULFATE 2 MG: 0.4 INJECTION, SOLUTION INTRAMUSCULAR; INTRAVENOUS; SUBCUTANEOUS at 13:30

## 2017-02-08 RX ADMIN — DOBUTAMINE HYDROCHLORIDE 30 MCG/KG/MIN: 200 INJECTION INTRAVENOUS at 13:20

## 2017-02-08 NOTE — PROGRESS NOTES
Pt here for dobutamine stress test.  Test, meds and side effects reviewed with patient.  Pt 5 beats short of target HR (max HR reached was 138 bpm) at 50 mcg Dobutamine and a total of 2.0 mg IV atropine.  Gave a total of 4 mg IV Metoprolol to bring HR back to baseline.  Post monitoring complete and VSS.  Pt escorted out to the gold waiting room.

## 2017-02-10 ENCOUNTER — TELEPHONE (OUTPATIENT)
Dept: GASTROENTEROLOGY | Facility: CLINIC | Age: 52
End: 2017-02-10

## 2017-02-10 ENCOUNTER — RADIANT APPOINTMENT (OUTPATIENT)
Dept: ULTRASOUND IMAGING | Facility: CLINIC | Age: 52
End: 2017-02-10
Attending: NURSE PRACTITIONER
Payer: COMMERCIAL

## 2017-02-10 ENCOUNTER — OFFICE VISIT (OUTPATIENT)
Dept: INFUSION THERAPY | Facility: CLINIC | Age: 52
End: 2017-02-10
Attending: NURSE PRACTITIONER
Payer: COMMERCIAL

## 2017-02-10 VITALS
OXYGEN SATURATION: 100 % | HEART RATE: 87 BPM | RESPIRATION RATE: 16 BRPM | TEMPERATURE: 97.9 F | SYSTOLIC BLOOD PRESSURE: 153 MMHG | DIASTOLIC BLOOD PRESSURE: 60 MMHG | WEIGHT: 229.5 LBS | BODY MASS INDEX: 31.12 KG/M2

## 2017-02-10 DIAGNOSIS — K74.60 HEPATIC CIRRHOSIS, UNSPECIFIED HEPATIC CIRRHOSIS TYPE (H): Primary | ICD-10-CM

## 2017-02-10 PROCEDURE — 25000128 H RX IP 250 OP 636: Mod: ZF | Performed by: NURSE PRACTITIONER

## 2017-02-10 PROCEDURE — 27210190 US PARACENTESIS

## 2017-02-10 PROCEDURE — P9047 ALBUMIN (HUMAN), 25%, 50ML: HCPCS | Mod: ZF | Performed by: NURSE PRACTITIONER

## 2017-02-10 PROCEDURE — 27210995 ZZH RX 272: Mod: ZF | Performed by: NURSE PRACTITIONER

## 2017-02-10 RX ORDER — ALBUMIN (HUMAN) 12.5 G/50ML
12.5 SOLUTION INTRAVENOUS 4 TIMES DAILY PRN
Status: DISCONTINUED | OUTPATIENT
Start: 2017-02-10 | End: 2017-02-10 | Stop reason: HOSPADM

## 2017-02-10 RX ADMIN — LIDOCAINE HYDROCHLORIDE 20 ML: 10 INJECTION, SOLUTION INFILTRATION; PERINEURAL at 12:58

## 2017-02-10 RX ADMIN — ALBUMIN HUMAN 50 G: 0.25 SOLUTION INTRAVENOUS at 12:59

## 2017-02-10 NOTE — PATIENT INSTRUCTIONS
Dear Camacho Bhagat    Thank you for choosing [facility] ([facility]) for your procedure.  The following information is a summary of our appointment as well as important reminders.      Please refer to your hospital discharge instructions for details on home care services, future appointments, phone numbers, and diet/activity levels.    Additional information: Your paracentesis procedure today, beginning weight 239.4 lb (108.5 kg), removed 5.1 liters ascites fluid, gave 50.0 grams albumin, ending weight 229.5 lb (104.1 kg).    We look forward in seeing you on your next appointment here at [facility].  Please don t hesitate to call us at [phone] to reschedule any of your appointments or to speak with one of the [facility] registered nurses.  It was a pleasure taking care of you today.    Sincerely,  Estefani Kirby RN  [facility]  [facility]  [address] [place] [address]  [address]  Phone:  [phone]  DISCHARGE INSTRUCTIONS FOLLOWING ABDOMINAL PARACENTESIS    After you go home:    No strenuous activity for 24 hours    Resume your regular diet    Limit fluid intake for the first 48 hours to no more than 2 quarts per day.  There should be minimal drainage from the needle site.  If drainage does occur and soaks through the bandage, apply gentle pressure with your hand for 5 minutes.    Notify MD for the following:    Excessive drainage    Excessive swelling, redness or tenderness at the needle site    Fever greater than 101 degrees F    Dizziness or light-headedness when getting up or walking      IF THIS IS A MEDICAL EMERGENCY, CALL [phone]    If you have any questions or concerns    Contact the Hepatology Clinic:   [phone]    If you are a post-transplant patient, contact the Transplant Office:  [phone]    If this is after hours, contact the hospital :  [phone] and as to have the GI  resident on call paged                   I have received and understand my discharge instructions and I have all of my personal belongings.          ------------------------------------------------------        ---------------------------------------------------    Patient / Significant Other's Signature     Relationship

## 2017-02-10 NOTE — PROGRESS NOTES
Paracentesis Nursing Note  Camacho Bhagat presents today [facility].    During today's appointment orders from TERESA Castañeda were completed.    Progress Note:  Patient identification verified by name and date of birth.  Assessment completed.  Vitals monitored throughout appointment and recorded in Doc Flowsheets.  See proceduralist note in ultrasound.    Date of consent or authorization: 2/3/2017.  Invasive Procedure Safety Checklist was completed and sent for scanning.     Paracentesis performed by Mir Rogers PA-C Radiology.    The following labs were communicated to provider performing paracentesis:  PLT       35   2/3/2017    Total amount of ascites fluid drained: 5.1 liters.  Color of ascites fluid: yellow.  Total amount of albumin given: 50  grams.    Patient tolerated procedure well.    Post procedure,denies pain or discomfort post paracentesis.      Discharge Plan:  Discharge instructions were reviewed with patient.  Patient/Representative verbalized understanding and all questions were answered.   Discharged [facility] in stable condition.    Estefani Kirby RN    Administrations This Visit     albumin human 25 % injection 12.5 g     Admin Date Action Dose Route Administered By             02/10/2017 New Bag 50 g Intravenous Estefani Kirby RN                    lidocaine BUFFERED 1 % injection 20 mL     Admin Date Action Dose Route Administered By             02/10/2017 Given by Other 20 mL Injection Estefani Kirby RN                          /58 mmHg  Pulse 87  Temp(Src) 97.9  F (36.6  C) (Tympanic)  Resp 16  Wt 108.591 kg (239 lb 6.4 oz)  SpO2 100%

## 2017-02-10 NOTE — TELEPHONE ENCOUNTER
----- Message from Toñito Camejo MD sent at 2/9/2017  4:31 PM CST -----  Regarding: RE: lab follow up  No.    ----- Message -----     From: Kristen Merritt LPN     Sent: 2/8/2017   8:57 AM       To: Toñito Camejo MD  Subject: FW: lab follow up                                Any new orders based on recent labs?   ----- Message -----     From: Jameson Morales Jr., RN     Sent: 2/8/2017   8:52 AM       To: Kristen Merritt LPN  Subject: lab follow up                                    Good morning    Just wanted to make sure you saw his labs and that you have updated Dr. Camejo to see if he wants to make any change?    He close to getting admitted with that sodium.     Thanks, tk

## 2017-02-13 ENCOUNTER — PRE VISIT (OUTPATIENT)
Dept: UROLOGY | Facility: CLINIC | Age: 52
End: 2017-02-13

## 2017-02-13 ENCOUNTER — TELEPHONE (OUTPATIENT)
Dept: TRANSPLANT | Facility: CLINIC | Age: 52
End: 2017-02-13

## 2017-02-13 NOTE — LETTER
Camacho TERESA Bhagat  [address] [place] [id number]    February 13, 2017    Dear Camacho    This letter is being sent to notify you that your status was changed to inactive on the liver transplant waiting list at the [facility], on 2/10/17.  Your status was changed because of your recent heart test that will need to be reviewed and cleared by Cardiology. You are scheduled to see Cardiology on 3/6/17 at 3:45 pm.     We will reconsider you for reactivation on the transplant list when your situation changes.  We would like the opportunity to discuss this decision with you.  If you are not scheduled for a return visit with your provider here, please call [phone] or [phone] to make an appointment.  We recommend that you continue to follow with your care providers for continued management of your liver disease.    Our program has physician and surgeon coverage 24 hours a day, 365 days a year.  If this coverage changes or there are substantial program changes, you will be notified in writing by letter.    You have been sent this letter to comply with the [employer] ([employer]) guidelines.    Finally, attached is a letter from the [employer] ([employer]).  It describes the services and information offered to patients by [employer] and the [facility].    We appreciate having had the opportunity to participate in your care.  If you have questions, please feel free to call the Transplant Office at [phone] or call [phone] to schedule an appointment.    Sincerely,    Jameson Morales Jr., BSN, RN  Liver Transplant Coordinator  [phone]    Encl: [employer] Letter

## 2017-02-13 NOTE — TELEPHONE ENCOUNTER
Patient moved to inactive late on 2/10/17 per Dr. Camejo after seeing result of  stress echo.     Phoned patient just now and left message to call me back so we can discuss hold status and further MD appts for clearance.

## 2017-02-14 ENCOUNTER — HOSPITAL ENCOUNTER (OUTPATIENT)
Facility: CLINIC | Age: 52
Setting detail: OBSERVATION
Discharge: HOME | End: 2017-02-16
Attending: EMERGENCY MEDICINE | Admitting: INTERNAL MEDICINE
Payer: COMMERCIAL

## 2017-02-14 ENCOUNTER — RADIANT APPOINTMENT (OUTPATIENT)
Dept: ULTRASOUND IMAGING | Facility: CLINIC | Age: 52
End: 2017-02-14
Attending: NURSE PRACTITIONER
Payer: COMMERCIAL

## 2017-02-14 ENCOUNTER — TELEPHONE (OUTPATIENT)
Dept: GASTROENTEROLOGY | Facility: CLINIC | Age: 52
End: 2017-02-14

## 2017-02-14 ENCOUNTER — COMMITTEE REVIEW (OUTPATIENT)
Dept: TRANSPLANT | Facility: CLINIC | Age: 52
End: 2017-02-14

## 2017-02-14 ENCOUNTER — OFFICE VISIT (OUTPATIENT)
Dept: INFUSION THERAPY | Facility: CLINIC | Age: 52
End: 2017-02-14
Attending: INTERNAL MEDICINE
Payer: COMMERCIAL

## 2017-02-14 VITALS
WEIGHT: 224.3 LBS | HEART RATE: 72 BPM | TEMPERATURE: 98.4 F | OXYGEN SATURATION: 99 % | BODY MASS INDEX: 30.42 KG/M2 | SYSTOLIC BLOOD PRESSURE: 146 MMHG | DIASTOLIC BLOOD PRESSURE: 61 MMHG

## 2017-02-14 DIAGNOSIS — K74.60 CIRRHOSIS OF LIVER WITH ASCITES, UNSPECIFIED HEPATIC CIRRHOSIS TYPE (H): ICD-10-CM

## 2017-02-14 DIAGNOSIS — E87.5 HYPERKALEMIA: ICD-10-CM

## 2017-02-14 DIAGNOSIS — C22.0 HEPATOCELLULAR CARCINOMA (H): ICD-10-CM

## 2017-02-14 DIAGNOSIS — Z79.4 TYPE 2 DIABETES MELLITUS WITH OTHER SPECIFIED COMPLICATION, WITH LONG-TERM CURRENT USE OF INSULIN (H): ICD-10-CM

## 2017-02-14 DIAGNOSIS — R18.8 CIRRHOSIS OF LIVER WITH ASCITES, UNSPECIFIED HEPATIC CIRRHOSIS TYPE (H): ICD-10-CM

## 2017-02-14 DIAGNOSIS — E87.1 HYPONATREMIA: ICD-10-CM

## 2017-02-14 DIAGNOSIS — K74.60 HEPATIC CIRRHOSIS, UNSPECIFIED HEPATIC CIRRHOSIS TYPE (H): ICD-10-CM

## 2017-02-14 DIAGNOSIS — K74.60 HEPATIC CIRRHOSIS, UNSPECIFIED HEPATIC CIRRHOSIS TYPE (H): Primary | ICD-10-CM

## 2017-02-14 DIAGNOSIS — I10 ESSENTIAL HYPERTENSION: ICD-10-CM

## 2017-02-14 DIAGNOSIS — E11.69 TYPE 2 DIABETES MELLITUS WITH OTHER SPECIFIED COMPLICATION, WITH LONG-TERM CURRENT USE OF INSULIN (H): ICD-10-CM

## 2017-02-14 LAB
ALBUMIN SERPL-MCNC: 3 G/DL (ref 3.4–5)
ALBUMIN SERPL-MCNC: 3.6 G/DL (ref 3.4–5)
ALP SERPL-CCNC: 206 U/L (ref 40–150)
ALT SERPL W P-5'-P-CCNC: 85 U/L (ref 0–70)
ANION GAP SERPL CALCULATED.3IONS-SCNC: 11 MMOL/L (ref 3–14)
ANION GAP SERPL CALCULATED.3IONS-SCNC: 11 MMOL/L (ref 6–17)
ANION GAP SERPL CALCULATED.3IONS-SCNC: 11 MMOL/L (ref 6–17)
AST SERPL W P-5'-P-CCNC: 126 U/L (ref 0–45)
BILIRUB SERPL-MCNC: 29.2 MG/DL (ref 0.2–1.3)
BILIRUB SERPL-MCNC: 30.7 MG/DL (ref 0.2–1.3)
BUN SERPL-MCNC: 28 MG/DL (ref 7–30)
BUN SERPL-MCNC: 28 MG/DL (ref 7–30)
BUN SERPL-MCNC: 29 MG/DL (ref 7–30)
CA-I BLD-SCNC: 4.6 MG/DL (ref 4.4–5.2)
CA-I BLD-SCNC: 4.7 MG/DL (ref 4.4–5.2)
CALCIUM SERPL-MCNC: 9.3 MG/DL (ref 8.5–10.1)
CHLORIDE BLD-SCNC: 88 MMOL/L (ref 94–109)
CHLORIDE BLD-SCNC: 88 MMOL/L (ref 94–109)
CHLORIDE SERPL-SCNC: 90 MMOL/L (ref 94–109)
CO2 BLD-SCNC: 24 MMOL/L (ref 20–32)
CO2 BLD-SCNC: 25 MMOL/L (ref 20–32)
CO2 SERPL-SCNC: 23 MMOL/L (ref 20–32)
CREAT BLD-MCNC: 0.9 MG/DL (ref 0.66–1.25)
CREAT BLD-MCNC: 0.9 MG/DL (ref 0.66–1.25)
CREAT SERPL-MCNC: 0.69 MG/DL (ref 0.66–1.25)
CREAT SERPL-MCNC: 0.71 MG/DL (ref 0.66–1.25)
ERYTHROCYTE [DISTWIDTH] IN BLOOD BY AUTOMATED COUNT: 14.6 % (ref 10–15)
GFR SERPL CREATININE-BSD FRML MDRD: 89 ML/MIN/1.7M2
GFR SERPL CREATININE-BSD FRML MDRD: 89 ML/MIN/1.7M2
GFR SERPL CREATININE-BSD FRML MDRD: ABNORMAL ML/MIN/1.7M2
GFR SERPL CREATININE-BSD FRML MDRD: NORMAL ML/MIN/1.7M2
GLUCOSE BLD-MCNC: 475 MG/DL (ref 70–99)
GLUCOSE BLD-MCNC: 498 MG/DL (ref 70–99)
GLUCOSE BLDC GLUCOMTR-MCNC: 444 MG/DL (ref 70–99)
GLUCOSE BLDC GLUCOMTR-MCNC: 484 MG/DL (ref 70–99)
GLUCOSE BLDC GLUCOMTR-MCNC: 517 MG/DL (ref 70–99)
GLUCOSE BLDC GLUCOMTR-MCNC: 522 MG/DL (ref 70–99)
GLUCOSE BLDC GLUCOMTR-MCNC: 524 MG/DL (ref 70–99)
GLUCOSE SERPL-MCNC: 487 MG/DL (ref 70–99)
HCT VFR BLD AUTO: 32.5 % (ref 40–53)
HCT VFR BLD CALC: 33 %PCV (ref 40–53)
HCT VFR BLD CALC: 35 %PCV (ref 40–53)
HGB BLD CALC-MCNC: 11.2 G/DL (ref 13.3–17.7)
HGB BLD CALC-MCNC: 11.9 G/DL (ref 13.3–17.7)
HGB BLD-MCNC: 11.4 G/DL (ref 13.3–17.7)
INR PPP: 1.55 (ref 0.86–1.14)
INR PPP: 1.61 (ref 0.86–1.14)
MAGNESIUM SERPL-MCNC: 2.2 MG/DL (ref 1.6–2.3)
MCH RBC QN AUTO: 35 PG (ref 26.5–33)
MCHC RBC AUTO-ENTMCNC: 35.1 G/DL (ref 31.5–36.5)
MCV RBC AUTO: 100 FL (ref 78–100)
PLATELET # BLD AUTO: 50 10E9/L (ref 150–450)
POTASSIUM BLD-SCNC: 5.9 MMOL/L (ref 3.4–5.3)
POTASSIUM BLD-SCNC: 6 MMOL/L (ref 3.4–5.3)
POTASSIUM SERPL-SCNC: 5.8 MMOL/L (ref 3.4–5.3)
PROT SERPL-MCNC: 6.1 G/DL (ref 6.8–8.8)
RBC # BLD AUTO: 3.26 10E12/L (ref 4.4–5.9)
SODIUM BLD-SCNC: 123 MMOL/L (ref 133–144)
SODIUM BLD-SCNC: 124 MMOL/L (ref 133–144)
SODIUM SERPL-SCNC: 123 MMOL/L (ref 133–144)
SODIUM SERPL-SCNC: 124 MMOL/L (ref 133–144)
WBC # BLD AUTO: 6.4 10E9/L (ref 4–11)

## 2017-02-14 PROCEDURE — 80053 COMPREHEN METABOLIC PANEL: CPT | Performed by: EMERGENCY MEDICINE

## 2017-02-14 PROCEDURE — 80047 BASIC METABLC PNL IONIZED CA: CPT

## 2017-02-14 PROCEDURE — 25000132 ZZH RX MED GY IP 250 OP 250 PS 637: Performed by: EMERGENCY MEDICINE

## 2017-02-14 PROCEDURE — 25000125 ZZHC RX 250: Performed by: INTERNAL MEDICINE

## 2017-02-14 PROCEDURE — 85014 HEMATOCRIT: CPT

## 2017-02-14 PROCEDURE — 82247 BILIRUBIN TOTAL: CPT | Performed by: INTERNAL MEDICINE

## 2017-02-14 PROCEDURE — P9047 ALBUMIN (HUMAN), 25%, 50ML: HCPCS | Mod: ZF | Performed by: NURSE PRACTITIONER

## 2017-02-14 PROCEDURE — 83735 ASSAY OF MAGNESIUM: CPT | Performed by: EMERGENCY MEDICINE

## 2017-02-14 PROCEDURE — 85610 PROTHROMBIN TIME: CPT | Performed by: EMERGENCY MEDICINE

## 2017-02-14 PROCEDURE — 96372 THER/PROPH/DIAG INJ SC/IM: CPT

## 2017-02-14 PROCEDURE — 85610 PROTHROMBIN TIME: CPT | Performed by: INTERNAL MEDICINE

## 2017-02-14 PROCEDURE — 25000128 H RX IP 250 OP 636: Mod: ZF | Performed by: NURSE PRACTITIONER

## 2017-02-14 PROCEDURE — 96365 THER/PROPH/DIAG IV INF INIT: CPT

## 2017-02-14 PROCEDURE — 40000275 ZZH STATISTIC RCP TIME EA 10 MIN

## 2017-02-14 PROCEDURE — 00000146 ZZHCL STATISTIC GLUCOSE BY METER IP

## 2017-02-14 PROCEDURE — 25000131 ZZH RX MED GY IP 250 OP 636 PS 637: Performed by: INTERNAL MEDICINE

## 2017-02-14 PROCEDURE — 25000132 ZZH RX MED GY IP 250 OP 250 PS 637: Performed by: INTERNAL MEDICINE

## 2017-02-14 PROCEDURE — 94640 AIRWAY INHALATION TREATMENT: CPT

## 2017-02-14 PROCEDURE — 25000308 HC RX OP HPI UCR WEL MED 250 IP 250: Performed by: EMERGENCY MEDICINE

## 2017-02-14 PROCEDURE — 25000128 H RX IP 250 OP 636: Performed by: EMERGENCY MEDICINE

## 2017-02-14 PROCEDURE — 99285 EMERGENCY DEPT VISIT HI MDM: CPT | Mod: 25

## 2017-02-14 PROCEDURE — 27210190 US PARACENTESIS

## 2017-02-14 PROCEDURE — 93005 ELECTROCARDIOGRAM TRACING: CPT

## 2017-02-14 PROCEDURE — 96375 TX/PRO/DX INJ NEW DRUG ADDON: CPT

## 2017-02-14 PROCEDURE — 82565 ASSAY OF CREATININE: CPT | Performed by: INTERNAL MEDICINE

## 2017-02-14 PROCEDURE — 99220 ZZC INITIAL OBSERVATION CARE,LEVL III: CPT | Performed by: INTERNAL MEDICINE

## 2017-02-14 PROCEDURE — 85027 COMPLETE CBC AUTOMATED: CPT | Performed by: EMERGENCY MEDICINE

## 2017-02-14 PROCEDURE — 27210995 ZZH RX 272: Mod: ZF | Performed by: NURSE PRACTITIONER

## 2017-02-14 PROCEDURE — 82565 ASSAY OF CREATININE: CPT

## 2017-02-14 PROCEDURE — 96374 THER/PROPH/DIAG INJ IV PUSH: CPT

## 2017-02-14 PROCEDURE — G0378 HOSPITAL OBSERVATION PER HR: HCPCS

## 2017-02-14 PROCEDURE — 82040 ASSAY OF SERUM ALBUMIN: CPT | Performed by: INTERNAL MEDICINE

## 2017-02-14 PROCEDURE — 96366 THER/PROPH/DIAG IV INF ADDON: CPT

## 2017-02-14 PROCEDURE — 84295 ASSAY OF SERUM SODIUM: CPT | Performed by: INTERNAL MEDICINE

## 2017-02-14 RX ORDER — ALBUMIN (HUMAN) 12.5 G/50ML
12.5 SOLUTION INTRAVENOUS 4 TIMES DAILY PRN
Status: DISCONTINUED | OUTPATIENT
Start: 2017-02-14 | End: 2017-02-14 | Stop reason: HOSPADM

## 2017-02-14 RX ORDER — CYCLOBENZAPRINE HCL 10 MG
10 TABLET ORAL 3 TIMES DAILY PRN
Status: DISCONTINUED | OUTPATIENT
Start: 2017-02-14 | End: 2017-02-16 | Stop reason: HOSPADM

## 2017-02-14 RX ORDER — ASPIRIN 325 MG
325 TABLET ORAL DAILY
Status: DISCONTINUED | OUTPATIENT
Start: 2017-02-15 | End: 2017-02-14 | Stop reason: CLARIF

## 2017-02-14 RX ORDER — PANTOPRAZOLE SODIUM 40 MG/1
40 TABLET, DELAYED RELEASE ORAL
Status: DISCONTINUED | OUTPATIENT
Start: 2017-02-15 | End: 2017-02-14 | Stop reason: CLARIF

## 2017-02-14 RX ORDER — ALBUTEROL SULFATE 0.83 MG/ML
5 SOLUTION RESPIRATORY (INHALATION) ONCE
Status: COMPLETED | OUTPATIENT
Start: 2017-02-14 | End: 2017-02-14

## 2017-02-14 RX ORDER — LACTULOSE 10 G/15ML
30 SOLUTION ORAL ONCE
Status: COMPLETED | OUTPATIENT
Start: 2017-02-14 | End: 2017-02-14

## 2017-02-14 RX ORDER — NALOXONE HYDROCHLORIDE 0.4 MG/ML
.1-.4 INJECTION, SOLUTION INTRAMUSCULAR; INTRAVENOUS; SUBCUTANEOUS
Status: DISCONTINUED | OUTPATIENT
Start: 2017-02-14 | End: 2017-02-16 | Stop reason: HOSPADM

## 2017-02-14 RX ORDER — OXYBUTYNIN CHLORIDE 5 MG/1
5 TABLET ORAL 2 TIMES DAILY
Status: DISCONTINUED | OUTPATIENT
Start: 2017-02-14 | End: 2017-02-14 | Stop reason: CLARIF

## 2017-02-14 RX ORDER — FUROSEMIDE 40 MG
40 TABLET ORAL DAILY
Status: DISCONTINUED | OUTPATIENT
Start: 2017-02-15 | End: 2017-02-14 | Stop reason: CLARIF

## 2017-02-14 RX ORDER — GABAPENTIN 300 MG/1
600 CAPSULE ORAL AT BEDTIME
Status: DISCONTINUED | OUTPATIENT
Start: 2017-02-14 | End: 2017-02-16 | Stop reason: HOSPADM

## 2017-02-14 RX ORDER — ALBUMIN (HUMAN) 12.5 G/50ML
12.5 SOLUTION INTRAVENOUS 4 TIMES DAILY PRN
Status: CANCELLED
Start: 2017-02-14

## 2017-02-14 RX ORDER — NICOTINE POLACRILEX 4 MG
15-30 LOZENGE BUCCAL
Status: DISCONTINUED | OUTPATIENT
Start: 2017-02-14 | End: 2017-02-16 | Stop reason: HOSPADM

## 2017-02-14 RX ORDER — LIDOCAINE 40 MG/G
CREAM TOPICAL
Status: DISCONTINUED | OUTPATIENT
Start: 2017-02-14 | End: 2017-02-16 | Stop reason: HOSPADM

## 2017-02-14 RX ORDER — DEXTROSE MONOHYDRATE 25 G/50ML
25-50 INJECTION, SOLUTION INTRAVENOUS
Status: DISCONTINUED | OUTPATIENT
Start: 2017-02-14 | End: 2017-02-16 | Stop reason: HOSPADM

## 2017-02-14 RX ADMIN — GABAPENTIN 600 MG: 300 CAPSULE ORAL at 22:05

## 2017-02-14 RX ADMIN — LACTULOSE 30 G: 10 SOLUTION ORAL at 17:27

## 2017-02-14 RX ADMIN — LIDOCAINE HYDROCHLORIDE 20 ML: 10 INJECTION, SOLUTION INFILTRATION; PERINEURAL at 12:41

## 2017-02-14 RX ADMIN — SODIUM BICARBONATE 50 MEQ: 84 INJECTION, SOLUTION INTRAVENOUS at 17:29

## 2017-02-14 RX ADMIN — CYCLOBENZAPRINE HYDROCHLORIDE 10 MG: 10 TABLET, FILM COATED ORAL at 23:59

## 2017-02-14 RX ADMIN — ALBUTEROL SULFATE 5 MG: 2.5 SOLUTION RESPIRATORY (INHALATION) at 16:54

## 2017-02-14 RX ADMIN — ALBUMIN HUMAN 50 G: 0.25 SOLUTION INTRAVENOUS at 12:42

## 2017-02-14 RX ADMIN — HUMAN INSULIN 5.5 UNITS/HR: 100 INJECTION, SOLUTION SUBCUTANEOUS at 21:45

## 2017-02-14 RX ADMIN — HUMAN INSULIN 12 UNITS: 100 INJECTION, SOLUTION SUBCUTANEOUS at 17:29

## 2017-02-14 RX ADMIN — INSULIN ASPART 12 UNITS: 100 INJECTION, SOLUTION INTRAVENOUS; SUBCUTANEOUS at 20:39

## 2017-02-14 ASSESSMENT — ENCOUNTER SYMPTOMS
SHORTNESS OF BREATH: 0
PALPITATIONS: 0
DIARRHEA: 1

## 2017-02-14 NOTE — TELEPHONE ENCOUNTER
DATE:  2/14/2017   TIME OF RECEIPT FROM LAB:  1:35pm  LAB TEST:  Potassium-6.3,  total bilirubin 30.7  LAB VALUE:  6.3, 30.7  RESULTS GIVEN WITH READ-BACK TO (PROVIDER):  Zainab WILKERSON LAB VALUE REPORTED TO PROVIDER:   1:35pm

## 2017-02-14 NOTE — PROGRESS NOTES
Paracentesis Nursing Note  Camacho Bhagat presents today to [facility] for a paracentesis.    During today's appointment orders from Abigail Robison were completed.    Progress Note:  Patient identification verified by name and date of birth.  Assessment completed.  Vitals monitored throughout appointment and recorded in Doc Flowsheets.  See proceduralist note in ultrasound.    Date of consent or authorization: 2/3.  Invasive Procedure Safety Checklist was completed and sent for scanning.     Paracentesis performed by Mitch Dinh PA-C Radiology.    The following labs were communicated to provider performing paracentesis:  Lab Results   Component Value Date    PLT 35 02/03/2017       Total amount of ascites fluid drained: 6.1 liters.  Color of ascites fluid: yellow/clear.  Total amount of albumin given: 50  grams.    Patient tolerated procedure well.    Post procedure,denies pain or discomfort post paracentesis.      Discharge Plan:  Discharge instructions were reviewed with patient.  Patient/Representative verbalized understanding and all questions were answered.   Discharged from [facility] in stable condition.    Holli Jha RN    Administrations This Visit     albumin human 25 % injection 12.5 g     Admin Date Action Dose Route Administered By             02/14/2017 New Bag 50 g Intravenous Holli Jha RN                    lidocaine BUFFERED 1 % injection 20 mL     Admin Date Action Dose Route Administered By             02/14/2017 Given by Other Clinician 20 mL Injection Holli Jha RN                          /64  Pulse 76  Temp 98.4  F (36.9  C) (Oral)  Wt 107.3 kg (236 lb 9.6 oz)  SpO2 99%  BMI 32.09 kg/m2

## 2017-02-14 NOTE — ED PROVIDER NOTES
History     Chief Complaint:  Hyperkalemia    HPI   Camacho Bhagat is a 51 year old male with a history of chronic liver failure secondary to non-alcoholic fatty liver disease complicated by hepatocellular carcinoma, on Lactulose, presents to the ED after receiving a call from his liver doctor's nurse to report to [facility] ED for a high potassium level.  The patient reports that normally his potassium is between 4-5, but today his level is 6.3. The patient reports that he is on paracentesis twice per week.  The patient reports that he has a history of cirrhosis and hepatocellular carcinoma.  He also states that due to the progression of his liver disease, he is on the liver transplant list.  The patient reports that he is experiencing 2-5 loose stools per day.  The patient states that his leg swelling has improved.  The patient states that he doesn't feel any differently today than any other day. He denies any chest pain, palpitations, or shortness of breath at this time. The patient denies any kidney problems, coronary artery disease, or recent changes in medication.  The patient was accompanied by his wife and daughter to the ED.    Allergies:  Erythromycin  Vioxx    Medications:    Lactulose  spironolactone (ALDACTONE) 50 MG tablet   aspirin 325 MG tablet   lisinopril (PRINIVIL/ZESTRIL) 40 MG tablet   metFORMIN (GLUCOPHAGE) 500 MG tablet   MULTIPLE VITAMIN PO   pantoprazole (PROTONIX) 40 MG EC tablet   rifaximin (XIFAXAN) 550 MG TABS tablet   ondansetron (ZOFRAN-ODT) 4 MG ODT tab   glucagon 1 MG SOLR injection   insulin lispro (HUMALOG) 100 UNIT/ML injection   lactobacillus rhamnosus, GG, (CULTURELL) capsule   phenazopyridine (PYRIDIUM) 100 MG tablet   cyclobenzaprine (FLEXERIL) 10 MG tablet   oxybutynin (DITROPAN) 5 MG tablet   gabapentin (NEURONTIN) 300 MG capsule   omeprazole (PRILOSEC) 20 MG capsule   phytonadione (MEPHYTON) 5 MG tablet   furosemide (LASIX) 40 MG tablet   insulin glargine (LANTUS) 100  UNIT/ML injection       Past Medical History:    Past Medical History   Diagnosis Date     Acute venous embolism and thrombosis of other specified veins 11/01     Cancer (H)      Depressive disorder, not elsewhere classified      Esophageal reflux      Fibromyalgia 1/2009     Osteoarthritis      Other and unspecified hyperlipidemia      Other chronic nonalcoholic liver disease      Other testicular hypofunction      Pneumonia, organism unspecified 10-01     Rheumatoid arthritis(714.0)      Type II or unspecified type diabetes mellitus without mention of complication, not stated as uncontrolled 11/01     Unspecified essential hypertension 11/01     Unspecified sleep apnea        Past Surgical History:    Past Surgical History   Procedure Laterality Date     C nonspecific procedure       tracheostomy     C nonspecific procedure       repair of deviated septum     C nonspecific procedure  2007     Rt knee arthroscopy     C total knee arthroplasty  2008     Right knee arthroscopy     Cholecystectomy       Esophagoscopy, gastroscopy, duodenoscopy (egd), combined N/A 8/4/2016     Procedure: COMBINED ESOPHAGOSCOPY, GASTROSCOPY, DUODENOSCOPY (EGD), BIOPSY SINGLE OR MULTIPLE;  Surgeon: Trent Pederson MD;  Location:  GI       Family History:    Father - Diabetes  Mother - Arthritis, Cancer, Thyroid Disease    Social History:  The patient was accompanied to the ED by his wife.  Smoking Status: Former Smoker  Alcohol Use: Negative  Marital Status:   [2]     Review of Systems   Respiratory: Negative for shortness of breath.    Cardiovascular: Positive for leg swelling (Bilaterally, improving from prior). Negative for chest pain and palpitations.   Gastrointestinal: Positive for diarrhea.   All other systems reviewed and are negative.    Physical Exam   Vitals:  Patient Vitals for the past 24 hrs:   BP Temp Temp src Pulse Heart Rate Resp SpO2 Height Weight   02/14/17 1654 - - - - - - 100 % - -   02/14/17 1540 175/80  98.2  F (36.8  C) Oral 76 76 18 100 % 1.829 m (6') 102.5 kg (226 lb)     Physical Exam    General:   Pleasant, jaundiced male.  HEENT:    Oropharynx is moist, without lesions or trismus.  Eyes:    Scleral icterus, PERRL  Neck:    Supple, no meningismus.     CV:     Regular rate and rhythm.      No murmurs, rubs or gallops.       No JVD or unilateral leg swelling.       2+ radial pulses bilateral.       No lower extremity edema.  PULM:    Clear to auscultation bilateral.       No respiratory distress.      Good air exchange.     No rales or wheezing.     No stridor.  ABD:    Soft, non-tender, mild distension.     No pulsatile masses.       No rebound, guarding or rigidity.  MSK:     No gross deformity to all four extremities.   LYMPH:   No cervical lymphadenopathy.  NEURO:   Alert and oriented x 3.      Strength is equal and symmetric.  Skin:    Warm, dry and intact.    Psych:    Mood is good and affect is appropriate.      Emergency Department Course     ECG:  ECG taken at 1601, ECG read at 1602  Normal sinus rhythm  Left axis deviation  Abnormal ECG  Rate 76 bpm. WV interval 156. QRS duration 100. QT/QTc 408/459. P-R-T axes 48 -36 35.    Laboratory:  Laboratory findings were communicated with the patient who voiced understanding of the findings.    ISTAT basic Met ICa HCT POCT Collected at 1621):  NA: 123 (L), Potassium: 5.9 (H), Chloride: 88 (L), Glucose: 475 (H), HGB: 11.2 (L), Hematocrit: 33 (L)  ISTAT basic Met ICa HCT POCT (Collected at 1751):  NA: 124 (L), Potassium: 6.0 (H), Chloride: 88 (L), Glucose: 498 (H), HGB: 11.9 (L), Hematocrit: 35 (L)  CBC: HGB: 11.4 (L). PLT: 50 (L) o/w WNL. (WBC 6.4)   CMP: NA: 124 (L), Potassium: 5.8 (H), Chloride: 90 (L), Glucose: 487 (H), Bilirubin total: 29.2 (HH), Protein Total: 6.1 (L), Alkphos: 206 (H), ALT: 85 (H), AST: 126 (H) (Creatinine 0.69)  INR: 1.61 (H)    Interventions:  1654 Albuterol 5 mg Nebulization  1727 Lactulose 30 g PO  1729 Sodium Bicarbonate 50 mEq  IV  1729 Insulin 12 Units IV     Emergency Department Course:  Nursing notes and vitals reviewed.  I performed an exam of the patient as documented above.   IV was inserted and blood was drawn for laboratory testing, results above.  At 1802 the patient was rechecked and was updated on the results of his laboratory and imaging studies.   I discussed the treatment plan with the patient and his family. They expressed understanding of this plan and consented to admission. I discussed the patient with the Hospitalist, who will admit the patient to a monitored bed for further evaluation and treatment.  I personally reviewed the laboratory results with the patient and answered all related questions prior to admission.    Impression & Plan      Medical Decision Making:  Camacho Bhagat is a 51 year old male with non-alcoholic liver cirrhosis complicated by hepatocellular carcinoma who is followed by Dr. Camejo of gastroenterology presents to the emergency department today for evaluation of hyperkalemia. The patient had a regularly scheduled paracentesis and was called for a potassium of 6.3. This is re-confirmed on laboratory studies here with a potassium of 5.9. He has no evidence of EKG manifestations of hyperkalemia; thus calcium not given. He was given Insulin, sodium bicarbonate, Albuterol, and Lactulose. The cause of his hyperkalemia is likely related to Lisinopril and spironolactone. Fortunately, he has no evidence of developing renal failure. The patient will be transferred to a telemetry bed to ensure resolution of hyperkalemia and to ensure he does not develop an arrhythmia.     Diagnosis:    ICD-10-CM    1. Hyperkalemia E87.5 Creatinine POCT     ISTAT Basic Met ICa HCT POCT     ISTAT Basic Met ICa HCT POCT     CANCELED: Creatinine POCT   2. Hyponatremia E87.1    3. Cirrhosis of liver with ascites, unspecified hepatic cirrhosis type (H) K74.60        Scribe Disclosure:  Bennie PERKINS, am serving as a scribe at  5:48 PM on 2/14/2017 to document services personally performed by Robert Varghese MD, based on my observations and the provider's statements to me.    2/14/2017   [facility] EMERGENCY DEPARTMENT       Robert Varghese MD  02/15/17 1000

## 2017-02-14 NOTE — PATIENT INSTRUCTIONS
Discharge Instructions for Paracentesis  Paracentesis is a procedure to remove extra fluid from your belly (abdomen). Buildup of fluid in the abdomen is called ascites. The procedure may have been done to take a sample of the fluid. Or, it may have been done to drain the extra fluid from your abdomen.     Ascites is buildup of excess fluid in the abdomen.   Home care    If you have pain after the procedure, your health care provider can prescribe or recommend pain medicines. Take these exactly as directed. If you stopped taking other medicines before the procedure, ask your provider when you can start them again.    Take it easy for 24 hours after the procedure. Avoid physical activity until your provider says it s OK.    You will have a small bandage over the puncture site. Stitches (sutures), surgical staples, adhesive tapes, adhesive strips, or surgical glue may be used to close the incision. They also help stop bleeding and speed healing. You may take the bandage off in 24 hours.    Check the puncture site for the signs of infection listed below.  Follow-up care  Make a follow-up appointment with your health care provider as directed. During your follow-up visit, your provider will check your healing. Let your provider know how you are feeling. You can also discuss the cause of your ascites and whether you need any further treatment.  When to call the doctor  Call your health care provider if you notice any of the following after the procedure:    A fever of 100.4 F (38.0 C) or higher    Trouble breathing    Pain that doesn't go away even after taking pain medicine    Abdominal pain not caused by having the skin punctured    Bleeding from the puncture site    More than a small amount of fluid leaking from the puncture site    Swelling of the abdomen    Signs of infection at the puncture site. These include increased pain, redness, or swelling, warmth, or foul-smelling drainage.    Blood in your  urine    Dizziness, lightheadedness, or fainting      [id number] [email or web address]. [address] [place] [address], [place] [address]. All rights reserved. This information is not intended as a substitute for professional medical care. Always follow your healthcare professional's instructions.

## 2017-02-14 NOTE — IP AVS SNAPSHOT
MRN:[id number]                      After Visit Summary   2/14/2017    Camacho Bhagat    MRN: [id number]           Thank you!     Thank you for choosing [facility] for your care. Our goal is always to provide you with excellent care. Hearing back from our patients is one way we can continue to improve our services. Please take a few minutes to complete the written survey that you may receive in the mail after you visit. If you would like to speak to someone directly about your visit please contact Patient Relations at [phone]. Thank you!          Patient Information     Date Of Birth          1965        About your hospital stay     You were admitted on:  February 14, 2017 You last received care in the:  [place] [address] [facility]    You were discharged on:  February 16, 2017       Who to Call     For medical emergencies, please call 911.  For non-urgent questions about your medical care, please call your primary care provider or clinic, [phone]          Attending Provider     Provider Specialty    Robert Varghese MD Emergency Medicine    [facility]       Primary Care Provider Office Phone # Fax #    Shay Kirkpatrick [phone]        PHYSICIANS [address] [facility] [address]  [facility] [address]        After Care Instructions     Activity       Your activity upon discharge: activity as tolerated            Diet       Follow this diet upon discharge: Orders Placed This Encounter      Fluid restriction 1200 ML FLUID      Combination Diet 2 gm K Diet; 7023-0414 Calories: Moderate Consistent CHO (4-6 CHO units/meal); 2 gm NA Diet                  Follow-up Appointments     Follow-up and recommended labs and tests        Follow up with primary care provider, Shay Kirkpatrick, within 7 days for hospital follow- up.  The following labs/tests are recommended: cmp.    Follow up with hepatology at [facility] for elevated bilirubin in 1-2  weeks.                  Your next 10 appointments already scheduled     Feb 17, 2017 12:00 PM CST   Paracentesis Visit with Uc Spec Inf Para Provider, UC 40 ATC   [facility] Specialty and Procedure ([facility])    [address] [address]   [phone]            Feb 21, 2017 12:00 PM CST   Paracentesis Visit with Uc Spec Inf Para Provider, UC 40 ATC   [facility] Specialty and Procedure ([facility])    [address] [address]   [phone]            Feb 23, 2017  1:00 PM CST   (Arrive by 12:45 PM)   Cystoscopy with Mitch Fitzgerald MD   [facility]rolog[facility] ([facility])    [address] [place] [address]  4th [facility] [address]   [phone]            Feb 24, 2017 12:00 PM CST   Paracentesis Visit with Uc Spec Inf Para Provider, UC 40 ATC   [facility] Specialty and Procedure ([facility])    [address] [address]   [phone]            Feb 28, 2017 12:00 PM CST   Paracentesis Visit with Uc Spec Inf Para Provider, UC 39 ATC   [facility] Procedure ([facility])    [address] [address]   [phone]            Mar 03, 2017 12:00 PM CST   Paracentesis Visit with Uc Spec Inf Para Provider   [facility] Specialty [facility] Procedure ([facility])    [address] [place] [address]   [phone]              Pending Results     Date and Time Order Name Status Description    2/15/2017 1332 Ethyl Glucuronide Urine In process             Statement of Approval     Ordered          02/16/17 1208  I have reviewed and agree with all  the recommendations and orders detailed in this document.  EFFECTIVE NOW     Approved and electronically signed by:  Serjio Singleton MD             Admission Information     Date & Time Provider Department Dept. Phone    2/14/2017 Nitish Bui MD [place] [address] [facility] [phone]      Your Vitals Were     Blood Pressure Pulse Temperature Respirations Height Weight    114/43 (BP Location: Right arm) 76 96.7  F (35.9  C) (Axillary) 20 1.829 m (6') 103.2 kg (227 lb 9.6 oz)    Pulse Oximetry BMI (Body Mass Index)                99% 30.87 kg/m2          MyChart Information     [employer] gives you secure access to your electronic health record. If you see a primary care provider, you can also send messages to your care team and make appointments. If you have questions, please call your primary care clinic.  If you do not have a primary care provider, please call [phone] and they will assist you.        Care EveryWhere ID     This is your Care EveryWhere ID. This could be used by other organizations to access your [place] medical records  [id number]           Review of your medicines      CONTINUE these medicines which may have CHANGED, or have new prescriptions. If we are uncertain of the size of tablets/capsules you have at home, strength may be listed as something that might have changed.        Dose / Directions    insulin glargine 100 UNIT/ML injection   Commonly known as:  LANTUS   This may have changed:  when to take this   Used for:  Type 2 diabetes mellitus with other specified complication, with long-term current use of insulin (H)        Dose:  65 Units   Inject 65 Units Subcutaneous every morning   Quantity:  19.5 mL   Refills:  0       insulin lispro 100 UNIT/ML injection   Commonly known as:  humaLOG   This may have changed:    - how much to take  - how to take this  - when to take this  - additional instructions   Used for:  Type 2 diabetes mellitus with other specified complication,  with long-term current use of insulin (H)        Dose:  20 Units   Inject 20 Units Subcutaneous 3 times daily (before meals)   Quantity:  1 vial   Refills:  0         CONTINUE these medicines which have NOT CHANGED        Dose / Directions    cyclobenzaprine 10 MG tablet   Commonly known as:  FLEXERIL   Used for:  Bladder spasms        Dose:  10 mg   Take 1 tablet (10 mg) by mouth 3 times daily as needed for muscle spasms   Quantity:  30 tablet   Refills:  0       furosemide 40 MG tablet   Commonly known as:  LASIX   Used for:  Cirrhosis of liver with ascites, unspecified hepatic cirrhosis type (H), Essential hypertension, Hepatic cirrhosis, unspecified hepatic cirrhosis type (H)        Dose:  40 mg   Take 1 tablet (40 mg) by mouth daily   Quantity:  30 tablet   Refills:  0       gabapentin 300 MG capsule   Commonly known as:  NEURONTIN   Used for:  Type 2 diabetes mellitus with diabetic polyneuropathy, unspecified long term insulin use status (H)        Dose:  600 mg   Take 2 capsules (600 mg) by mouth At Bedtime   Quantity:  60 capsule   Refills:  0       glucagon 1 MG Solr injection   Used for:  Hypoglycemia        Dose:  1 mg   Inject 1 mg Subcutaneous every 15 minutes as needed for low blood sugar (May repeat x 1 only)   Quantity:  1 each   Refills:  0       lactobacillus rhamnosus (GG) capsule   Used for:  Imbalanced nutrition        Dose:  1 capsule   Take 1 capsule by mouth 2 times daily   Quantity:  60 capsule   Refills:  0       omeprazole 20 MG CR capsule   Commonly known as:  priLOSEC   Used for:  Gastroesophageal reflux disease without esophagitis        Dose:  20 mg   Take 1 capsule (20 mg) by mouth 2 times daily (before meals)   Quantity:  60 capsule   Refills:  0       ondansetron 4 MG ODT tab   Commonly known as:  ZOFRAN-ODT   Used for:  Cirrhosis of liver with ascites, unspecified hepatic cirrhosis type (H), Nausea        Dose:  4 mg   Take 1 tablet (4 mg) by mouth every 8 hours as needed for  nausea   Quantity:  30 tablet   Refills:  0       rifaximin 550 MG Tabs tablet   Commonly known as:  XIFAXAN   Indication:  hepatic encephalopathy   Used for:  Hepatic encephalopathy (H)        Dose:  550 mg   Take 1 tablet (550 mg) by mouth 2 times daily   Quantity:  60 tablet   Refills:  11       spironolactone 50 MG tablet   Commonly known as:  ALDACTONE   Used for:  Hepatic cirrhosis, unspecified hepatic cirrhosis type (H)        Dose:  50 mg   Take 1 tablet (50 mg) by mouth daily   Quantity:  90 tablet   Refills:  3         STOP taking     lisinopril 40 MG tablet   Commonly known as:  PRINIVIL/ZESTRIL                Where to get your medicines      Some of these will need a paper prescription and others can be bought over the counter. Ask your nurse if you have questions.     Bring a paper prescription for each of these medications     furosemide 40 MG tablet    insulin glargine 100 UNIT/ML injection    insulin lispro 100 UNIT/ML injection                Protect others around you: Learn how to safely use, store and throw away your medicines at www.disposemymeds.org.             Medication List: This is a list of all your medications and when to take them. Check marks below indicate your daily home schedule. Keep this list as a reference.      Medications           Morning Afternoon Evening Bedtime As Needed    cyclobenzaprine 10 MG tablet   Commonly known as:  FLEXERIL   Take 1 tablet (10 mg) by mouth 3 times daily as needed for muscle spasms   Last time this was given:  10 mg on 2/14/2017 11:59 PM                                   furosemide 40 MG tablet   Commonly known as:  LASIX   Take 1 tablet (40 mg) by mouth daily            Start 2/17/17 AM                       gabapentin 300 MG capsule   Commonly known as:  NEURONTIN   Take 2 capsules (600 mg) by mouth At Bedtime   Last time this was given:  600 mg on 2/15/2017  9:40 PM                        Take tonight 2/16/17           glucagon 1 MG Solr injection    Inject 1 mg Subcutaneous every 15 minutes as needed for low blood sugar (May repeat x 1 only)                                   insulin glargine 100 UNIT/ML injection   Commonly known as:  LANTUS   Inject 65 Units Subcutaneous every morning            Start 2/17/17 AM                       insulin lispro 100 UNIT/ML injection   Commonly known as:  humaLOG   Inject 20 Units Subcutaneous 3 times daily (before meals)                    Per instructions with meals               lactobacillus rhamnosus (GG) capsule   Take 1 capsule by mouth 2 times daily                    Start tonight 2/16/17               omeprazole 20 MG CR capsule   Commonly known as:  priLOSEC   Take 1 capsule (20 mg) by mouth 2 times daily (before meals)   Last time this was given:  20 mg on 2/16/2017 10:00 AM                    Take tonight 2/16/17               ondansetron 4 MG ODT tab   Commonly known as:  ZOFRAN-ODT   Take 1 tablet (4 mg) by mouth every 8 hours as needed for nausea                                   rifaximin 550 MG Tabs tablet   Commonly known as:  XIFAXAN   Take 1 tablet (550 mg) by mouth 2 times daily   Last time this was given:  550 mg on 2/16/2017  9:58 AM                    Take tonight 2/16/17               spironolactone 50 MG tablet   Commonly known as:  ALDACTONE   Take 1 tablet (50 mg) by mouth daily            Start 2/17/17 AM

## 2017-02-14 NOTE — TELEPHONE ENCOUNTER
Writer paged Dr. Camejo with critical potassium and total bilirubin values.  Per Dr. Camejo patient needs to be seen in the ED.  Patient contacted and will head over to [facility] which is closer to patients home.  Patient was driving home from being seen in [facility] here today after his paracentesis, advised that if patient is not feeling well or notices any irregular heartbeats he should pull over and call 911.  Called to [facility] ED to let them know patient was on his way.

## 2017-02-14 NOTE — COMMITTEE REVIEW
Abdominal Committee Review Note     Evaluation Date: 2/8/2016  Committee Review Date: 2/14/2017    Organ being evaluated for: Liver    Transplant Phase: Waitlist  Transplant Status: Inactive    Transplant Coordinator: Jameson Morales Jr.  Transplant Surgeon:   Chaparro Anderson    Referring Physician:     Primary Diagnosis: Primary Liver Malignancy: Hepatoma, Hepatocellular Carcinoma (HCC)  Secondary Diagnosis: Cirrhosis: Fatty Liver ([place])    Committee Review Members:  Nutrition Joycelyn Reyes, MARÍA   Pharmacy Alex Farley, [facility]    - Clinical JOSEY Kelley, Maria Dolores Sparrow, [facility]   Transplant Uche Mary MD, Zeenat Henriquez, RN, Aggie Rothman, RN, Toñito Camejo MD, Jr Jameson Morales, RN, Abigail Robison, APRN CNP, Abril Doty, RN, Flora Bueno MD   Transplant Surgery Tip Zhang MD       Transplant Eligibility: Cirrhosis with MELD, HCC    Committee Review Decision: Remain Inactive    Relative Contraindications: Other, pending cardiology follow up / clearance    Absolute Contraindications: None    Committee Chair Toñito Camejo MD verbally attested to the committee's decision.    Committee Discussion Details: Hold pending cardiology clearance due to DSE

## 2017-02-14 NOTE — Clinical Note
Admitting Physician: KATJA GUSMAN [[id number]]   [id number]   Bed Type: Adult Med/Surg [46]   Special needs: Fall Risk [8]   Bed request comments: Patient is an observation tele admission.

## 2017-02-14 NOTE — IP AVS SNAPSHOT
[place] [address] [facility]    [address]    [facility] [address]    Phone:  [phone]    Fax:  [phone]                                       After Visit Summary   2/14/2017    Camacho Bhagat    MRN: [id number]           After Visit Summary Signature Page     I have received my discharge instructions, and my questions have been answered. I have discussed any challenges I see with this plan with the nurse or doctor.    ..........................................................................................................................................  Patient/Patient Representative Signature      ..........................................................................................................................................  Patient Representative Print Name and Relationship to Patient    ..................................................               ................................................  Date                                            Time    ..........................................................................................................................................  Reviewed by Signature/Title    ...................................................              ..............................................  Date                                                            Time

## 2017-02-14 NOTE — MR AVS SNAPSHOT
After Visit Summary   2/14/2017    Camacho Bhagat    MRN: [id number]           Patient Information     Date Of Birth          1965        Visit Information        Provider Department      2/14/2017 12:00 PM Mitch Dinh PA-C;  40 ATC [facility] Specialty and Procedure        Today's Diagnoses     Hepatic cirrhosis, unspecified hepatic cirrhosis type (H)    -  1    Cirrhosis of liver with ascites, unspecified hepatic cirrhosis type (H)        Hepatocellular carcinoma (H)          Care Instructions      Discharge Instructions for Paracentesis  Paracentesis is a procedure to remove extra fluid from your belly (abdomen). Buildup of fluid in the abdomen is called ascites. The procedure may have been done to take a sample of the fluid. Or, it may have been done to drain the extra fluid from your abdomen.     Ascites is buildup of excess fluid in the abdomen.   Home care    If you have pain after the procedure, your health care provider can prescribe or recommend pain medicines. Take these exactly as directed. If you stopped taking other medicines before the procedure, ask your provider when you can start them again.    Take it easy for 24 hours after the procedure. Avoid physical activity until your provider says it s OK.    You will have a small bandage over the puncture site. Stitches (sutures), surgical staples, adhesive tapes, adhesive strips, or surgical glue may be used to close the incision. They also help stop bleeding and speed healing. You may take the bandage off in 24 hours.    Check the puncture site for the signs of infection listed below.  Follow-up care  Make a follow-up appointment with your health care provider as directed. During your follow-up visit, your provider will check your healing. Let your provider know how you are feeling. You can also discuss the cause of your ascites and whether you need any further treatment.  When to call the  doctor  Call your health care provider if you notice any of the following after the procedure:    A fever of 100.4 F (38.0 C) or higher    Trouble breathing    Pain that doesn't go away even after taking pain medicine    Abdominal pain not caused by having the skin punctured    Bleeding from the puncture site    More than a small amount of fluid leaking from the puncture site    Swelling of the abdomen    Signs of infection at the puncture site. These include increased pain, redness, or swelling, warmth, or foul-smelling drainage.    Blood in your urine    Dizziness, lightheadedness, or fainting      [id number] [employer]. [address] [place] [address]. All rights reserved. This information is not intended as a substitute for professional medical care. Always follow your healthcare professional's instructions.              Follow-ups after your visit        Your next 10 appointments already scheduled     Feb 17, 2017 12:00 PM CST   Paracentesis Visit with Uc Spec Inf Para Provider, UC 40 ATC   [facility] Specialty and Procedure ([facility])    [address] [place] [address]   [phone]            Feb 21, 2017 12:00 PM CST   Paracentesis Visit with Uc Spec Inf Para Provider, UC 40 ATC   [facility] Specialty and Procedure ([facility])    [address] [place] [address]   [phone]            Feb 23, 2017  1:00 PM CST   (Arrive by 12:45 PM)   Cystoscopy with Mitch Fitzgerald MD   [facility] Prostate and Urologic Cancers ([facility])    [address] [place] [address]   [phone]            Feb 24, 2017 12:00 PM CST   Paracentesis Visit with Uc Spec Inf Para Provider, UC 40 ATC   [facility] Specialty and Procedure ([facility]    [address] [facility]  2nd [facility] [address] [phone]            Feb 28, 2017 12:00 PM CST   Paracentesis Visit with Uc Spec Inf Para Provider, UC 39 ATC   [facility] Specialty and Procedure ([facility])    [address] [address] [phone]            Mar 03, 2017 12:00 PM CST   Paracentesis Visit with Uc Spec Inf Para Provider   [facility] Specialty and Procedure ([facility])    [address] [address] [phone]              Who to contact     If you have questions or need follow up information about today's clinic visit or your schedule please contact [facility] SPECIALTY AND PROCEDURE directly at [phone].  Normal or non-critical lab and imaging results will be communicated to you by [employer], letter or phone within 4 business days after the clinic has received the results. If you do not hear from us within 7 days, please contact the clinic through [employer] or phone. If you have a critical or abnormal lab result, we will notify you by phone as soon as possible.  Submit refill requests through [employer] or call your pharmacy and they will forward the refill request to us. Please allow 3 business days for your refill to be completed.          Additional Information About Your Visit        MyChart Information     [employer] gives you secure access to your electronic health record. If you see a primary care provider, you can also send messages to your care team and make appointments. If you have questions, please call your primary care clinic.  If you do not have a primary care provider, please call [phone] and they will assist you.        Care EveryWhere ID     This is your Care EveryWhere ID. This could be used by other organizations to access your [facility]  records  [id number]        Your Vitals Were     Pulse Temperature Pulse Oximetry BMI (Body Mass Index)          72 98.4  F (36.9  C) (Oral) 99% 30.42 kg/m2         Blood Pressure from Last 3 Encounters:   02/14/17 146/61   02/10/17 153/60   02/07/17 148/61    Weight from Last 3 Encounters:   02/14/17 101.7 kg (224 lb 4.8 oz)   02/10/17 104.1 kg (229 lb 8 oz)   02/07/17 106.2 kg (234 lb 3.2 oz)              We Performed the Following     Albumin level     Bilirubin  total     Creatinine     INR     Sodium     Treatment Conditions     US Paracentesis        Primary Care Provider Office Phone # Fax #    Shay Kirkpatrick [phone]        PHYSICIANS [address] [facility] [address]  [facility] [address]        Thank you!     Thank you for choosing [facility] SPECIALTY AND PROCEDURE  for your care. Our goal is always to provide you with excellent care. Hearing back from our patients is one way we can continue to improve our services. Please take a few minutes to complete the written survey that you may receive in the mail after your visit with us. Thank you!             Your Updated Medication List - Protect others around you: Learn how to safely use, store and throw away your medicines at www.disposemymeds.org.          This list is accurate as of: 2/14/17  2:34 PM.  Always use your most recent med list.                   Brand Name Dispense Instructions for use    aspirin 325 MG tablet          cyclobenzaprine 10 MG tablet    FLEXERIL    30 tablet    Take 1 tablet (10 mg) by mouth 3 times daily as needed for muscle spasms       furosemide 40 MG tablet    LASIX    30 tablet    Take 1 tablet (40 mg) by mouth daily       gabapentin 300 MG capsule    NEURONTIN    60 capsule    Take 2 capsules (600 mg) by mouth At Bedtime       glucagon 1 MG Solr injection     1 each    Inject 1 mg Subcutaneous every 15 minutes as needed for low blood sugar (May repeat x 1 only)       insulin glargine 100  UNIT/ML injection    LANTUS    19.5 mL    Inject 65 Units Subcutaneous every 24 hours       insulin lispro 100 UNIT/ML injection    humaLOG    1 vial    1 unit per 50 glucose over 150: If blood glucose 150 to 200 give 1 unit, if glucose 200-250 give 2 total units, 250-300 give 3 total units (1+1+1).  Give enough for one week.       lactobacillus rhamnosus (GG) capsule     60 capsule    Take 1 capsule by mouth 2 times daily       lisinopril 40 MG tablet    PRINIVIL/ZESTRIL         metFORMIN 500 MG tablet    GLUCOPHAGE         MULTIPLE VITAMIN PO          omeprazole 20 MG CR capsule    priLOSEC    60 capsule    Take 1 capsule (20 mg) by mouth 2 times daily (before meals)       ondansetron 4 MG ODT tab    ZOFRAN-ODT    30 tablet    Take 1 tablet (4 mg) by mouth every 8 hours as needed for nausea       oxybutynin 5 MG tablet    DITROPAN    60 tablet    Take 1 tablet (5 mg) by mouth 2 times daily       pantoprazole 40 MG EC tablet    PROTONIX         phenazopyridine 100 MG tablet    PYRIDIUM    90 tablet    Take 1 tablet (100 mg) by mouth 3 times daily (with meals)       phytonadione 5 MG tablet    MEPHYTON    30 tablet    Take 0.5 tablets (2.5 mg) by mouth daily       rifaximin 550 MG Tabs tablet    XIFAXAN    60 tablet    Take 1 tablet (550 mg) by mouth 2 times daily       spironolactone 50 MG tablet    ALDACTONE    90 tablet    Take 1 tablet (50 mg) by mouth daily

## 2017-02-14 NOTE — ED NOTES
Pt. Has chronic liver failure and had paracentesis today.  Called by clinic stating Potassium level was high.   Patient alert and oriented x3.  Airway, breathing and circulation intact.

## 2017-02-15 ENCOUNTER — APPOINTMENT (OUTPATIENT)
Dept: GENERAL RADIOLOGY | Facility: CLINIC | Age: 52
End: 2017-02-15
Attending: INTERNAL MEDICINE
Payer: COMMERCIAL

## 2017-02-15 ENCOUNTER — APPOINTMENT (OUTPATIENT)
Dept: ULTRASOUND IMAGING | Facility: CLINIC | Age: 52
End: 2017-02-15
Attending: INTERNAL MEDICINE
Payer: COMMERCIAL

## 2017-02-15 LAB
ALBUMIN FLD-MCNC: 0.3 G/DL
ALBUMIN SERPL-MCNC: 3 G/DL (ref 3.4–5)
ANION GAP SERPL CALCULATED.3IONS-SCNC: 10 MMOL/L (ref 3–14)
ANION GAP SERPL CALCULATED.3IONS-SCNC: 8 MMOL/L (ref 3–14)
APPEARANCE FLD: NORMAL
BUN SERPL-MCNC: 31 MG/DL (ref 7–30)
BUN SERPL-MCNC: 31 MG/DL (ref 7–30)
CALCIUM SERPL-MCNC: 8.6 MG/DL (ref 8.5–10.1)
CALCIUM SERPL-MCNC: 8.9 MG/DL (ref 8.5–10.1)
CHLORIDE SERPL-SCNC: 87 MMOL/L (ref 94–109)
CHLORIDE SERPL-SCNC: 91 MMOL/L (ref 94–109)
CO2 SERPL-SCNC: 23 MMOL/L (ref 20–32)
CO2 SERPL-SCNC: 24 MMOL/L (ref 20–32)
COLOR FLD: YELLOW
CREAT SERPL-MCNC: 0.73 MG/DL (ref 0.66–1.25)
CREAT SERPL-MCNC: 0.74 MG/DL (ref 0.66–1.25)
GFR SERPL CREATININE-BSD FRML MDRD: ABNORMAL ML/MIN/1.7M2
GFR SERPL CREATININE-BSD FRML MDRD: ABNORMAL ML/MIN/1.7M2
GLUCOSE BLDC GLUCOMTR-MCNC: 139 MG/DL (ref 70–99)
GLUCOSE BLDC GLUCOMTR-MCNC: 150 MG/DL (ref 70–99)
GLUCOSE BLDC GLUCOMTR-MCNC: 151 MG/DL (ref 70–99)
GLUCOSE BLDC GLUCOMTR-MCNC: 153 MG/DL (ref 70–99)
GLUCOSE BLDC GLUCOMTR-MCNC: 156 MG/DL (ref 70–99)
GLUCOSE BLDC GLUCOMTR-MCNC: 172 MG/DL (ref 70–99)
GLUCOSE BLDC GLUCOMTR-MCNC: 213 MG/DL (ref 70–99)
GLUCOSE BLDC GLUCOMTR-MCNC: 216 MG/DL (ref 70–99)
GLUCOSE BLDC GLUCOMTR-MCNC: 248 MG/DL (ref 70–99)
GLUCOSE BLDC GLUCOMTR-MCNC: 263 MG/DL (ref 70–99)
GLUCOSE BLDC GLUCOMTR-MCNC: 271 MG/DL (ref 70–99)
GLUCOSE BLDC GLUCOMTR-MCNC: 290 MG/DL (ref 70–99)
GLUCOSE BLDC GLUCOMTR-MCNC: 303 MG/DL (ref 70–99)
GLUCOSE BLDC GLUCOMTR-MCNC: 305 MG/DL (ref 70–99)
GLUCOSE SERPL-MCNC: 150 MG/DL (ref 70–99)
GLUCOSE SERPL-MCNC: 267 MG/DL (ref 70–99)
INTERPRETATION ECG - MUSE: NORMAL
LYMPHOCYTES NFR FLD MANUAL: 54 %
MONOS+MACROS NFR FLD MANUAL: 40 %
NEUTS BAND NFR FLD MANUAL: 5 %
OTHER CELLS FLD MANUAL: 1 %
POTASSIUM SERPL-SCNC: 5.2 MMOL/L (ref 3.4–5.3)
POTASSIUM SERPL-SCNC: 5.2 MMOL/L (ref 3.4–5.3)
POTASSIUM SERPL-SCNC: 5.5 MMOL/L (ref 3.4–5.3)
PROT FLD-MCNC: 0.4 G/DL
SODIUM SERPL-SCNC: 120 MMOL/L (ref 133–144)
SODIUM SERPL-SCNC: 121 MMOL/L (ref 133–144)
SODIUM SERPL-SCNC: 123 MMOL/L (ref 133–144)
SPECIMEN SOURCE FLD: NORMAL
WBC # FLD AUTO: 258 /UL

## 2017-02-15 PROCEDURE — 84157 ASSAY OF PROTEIN OTHER: CPT | Performed by: INTERNAL MEDICINE

## 2017-02-15 PROCEDURE — 36415 COLL VENOUS BLD VENIPUNCTURE: CPT | Performed by: INTERNAL MEDICINE

## 2017-02-15 PROCEDURE — 25000132 ZZH RX MED GY IP 250 OP 250 PS 637: Performed by: INTERNAL MEDICINE

## 2017-02-15 PROCEDURE — 80048 BASIC METABOLIC PNL TOTAL CA: CPT | Performed by: INTERNAL MEDICINE

## 2017-02-15 PROCEDURE — 84295 ASSAY OF SERUM SODIUM: CPT | Performed by: INTERNAL MEDICINE

## 2017-02-15 PROCEDURE — 99225 ZZC SUBSEQUENT OBSERVATION CARE,LEVEL II: CPT | Performed by: INTERNAL MEDICINE

## 2017-02-15 PROCEDURE — 25000131 ZZH RX MED GY IP 250 OP 636 PS 637: Performed by: INTERNAL MEDICINE

## 2017-02-15 PROCEDURE — 84132 ASSAY OF SERUM POTASSIUM: CPT | Performed by: INTERNAL MEDICINE

## 2017-02-15 PROCEDURE — 80048 BASIC METABOLIC PNL TOTAL CA: CPT | Mod: 91 | Performed by: INTERNAL MEDICINE

## 2017-02-15 PROCEDURE — 89051 BODY FLUID CELL COUNT: CPT | Performed by: INTERNAL MEDICINE

## 2017-02-15 PROCEDURE — 82042 OTHER SOURCE ALBUMIN QUAN EA: CPT | Performed by: INTERNAL MEDICINE

## 2017-02-15 PROCEDURE — G0378 HOSPITAL OBSERVATION PER HR: HCPCS

## 2017-02-15 PROCEDURE — 00000146 ZZHCL STATISTIC GLUCOSE BY METER IP

## 2017-02-15 PROCEDURE — 27210190 US PARACENTESIS

## 2017-02-15 PROCEDURE — 25000125 ZZHC RX 250: Performed by: INTERNAL MEDICINE

## 2017-02-15 PROCEDURE — 96366 THER/PROPH/DIAG IV INF ADDON: CPT

## 2017-02-15 PROCEDURE — 82040 ASSAY OF SERUM ALBUMIN: CPT | Performed by: INTERNAL MEDICINE

## 2017-02-15 PROCEDURE — 96372 THER/PROPH/DIAG INJ SC/IM: CPT

## 2017-02-15 PROCEDURE — 71020 XR CHEST 2 VW: CPT

## 2017-02-15 RX ORDER — OXYCODONE HYDROCHLORIDE 5 MG/1
5-10 TABLET ORAL EVERY 6 HOURS PRN
Status: DISCONTINUED | OUTPATIENT
Start: 2017-02-15 | End: 2017-02-16 | Stop reason: HOSPADM

## 2017-02-15 RX ORDER — LIDOCAINE HYDROCHLORIDE 10 MG/ML
1 INJECTION, SOLUTION EPIDURAL; INFILTRATION; INTRACAUDAL; PERINEURAL ONCE
Status: COMPLETED | OUTPATIENT
Start: 2017-02-15 | End: 2017-02-15

## 2017-02-15 RX ORDER — SODIUM POLYSTYRENE SULFONATE 15 G/60ML
15 SUSPENSION ORAL; RECTAL ONCE
Status: COMPLETED | OUTPATIENT
Start: 2017-02-15 | End: 2017-02-15

## 2017-02-15 RX ORDER — DEXTROSE MONOHYDRATE 25 G/50ML
25-50 INJECTION, SOLUTION INTRAVENOUS
Status: DISCONTINUED | OUTPATIENT
Start: 2017-02-15 | End: 2017-02-16 | Stop reason: HOSPADM

## 2017-02-15 RX ORDER — NICOTINE POLACRILEX 4 MG
15-30 LOZENGE BUCCAL
Status: DISCONTINUED | OUTPATIENT
Start: 2017-02-15 | End: 2017-02-16 | Stop reason: HOSPADM

## 2017-02-15 RX ADMIN — LIDOCAINE HYDROCHLORIDE 10 MG: 10 INJECTION, SOLUTION EPIDURAL; INFILTRATION; INTRACAUDAL; PERINEURAL at 16:19

## 2017-02-15 RX ADMIN — OXYCODONE HYDROCHLORIDE 10 MG: 5 TABLET ORAL at 00:44

## 2017-02-15 RX ADMIN — RIFAXIMIN 550 MG: 550 TABLET ORAL at 21:40

## 2017-02-15 RX ADMIN — HUMAN INSULIN 17.5 UNITS/HR: 100 INJECTION, SOLUTION SUBCUTANEOUS at 00:40

## 2017-02-15 RX ADMIN — INSULIN GLARGINE 65 UNITS: 100 INJECTION, SOLUTION SUBCUTANEOUS at 07:40

## 2017-02-15 RX ADMIN — GABAPENTIN 600 MG: 300 CAPSULE ORAL at 21:40

## 2017-02-15 RX ADMIN — OMEPRAZOLE 20 MG: 20 CAPSULE, DELAYED RELEASE ORAL at 16:05

## 2017-02-15 RX ADMIN — OMEPRAZOLE 20 MG: 20 CAPSULE, DELAYED RELEASE ORAL at 07:03

## 2017-02-15 RX ADMIN — OXYCODONE HYDROCHLORIDE 10 MG: 5 TABLET ORAL at 16:05

## 2017-02-15 RX ADMIN — RIFAXIMIN 550 MG: 550 TABLET ORAL at 07:39

## 2017-02-15 RX ADMIN — HUMAN INSULIN 4 UNITS/HR: 100 INJECTION, SOLUTION SUBCUTANEOUS at 05:37

## 2017-02-15 RX ADMIN — SODIUM POLYSTYRENE SULFONATE 15 G: 15 SUSPENSION ORAL; RECTAL at 09:48

## 2017-02-15 NOTE — PLAN OF CARE
Problem: Goal Outcome Summary  Goal: Goal Outcome Summary  Outcome: No Change  Alert/oriented SBA. Lungs clear. No edema. 2 Paracentesis sites RL abdomen w/bandaid on. Abd rounded. Icteric. Tele NSR. Insulin gtt off at 1000.  SSI and meal coverage added to AM lantus. Repeat labs K+ increased 5.2 to 5.5. Kayexalate given this a.m. W/diarrhea result. Fluid restriction/I & O implemented. Pt stated understanding of plan w/GI & Neph consults planned.

## 2017-02-15 NOTE — CONSULTS
[facility]    RENAL CONSULTATION NOTE    REFERRING MD:  Dr. Singleton    REASON FOR CONSULTATION:  Hyperkalemia, hyponatremia    DATE OF CONSULTATION: 02/15/17    SHORTHAND KEY FOR MY NOTES:  c = with, s = without, p = after, a = before, x = except, asx = asymptomatic, tx = transplant or treatment, sx = symptoms or symptomatic, cx = canceled or culture, rxn = reaction, yday = yesterday, nl = normal, abx = antibiotics, fxn = function, dx = diagnosis, dz = disease, m/h = melena/hematochezia, c/d/l/ha = cramping/dizziness/lightheadedness/headache, d/c = discharge or diarrhea/constipation, f/c/n/v = fevers/chills/nausea/vomiting, cp/sob = chest pain/shortness of breath.    HPI: Camacho Bhagat is a 51 year old male c ESLD 2 CASTAÑEDA who was admitted on 2/14/2017 c hyperkalemia and found to also have hyponatremia.  Pt's sugars were also high.    Pt has known liver dz and is volume up as a result of the low alb.  He is also diabetic and sees Dr. Eckert.  He is due to see him soon and didn't take his insulin.  Pt was getting a paracentesis done per his routine and labs were checked that showed a high k, low Na and high glc.  As a result, he was sent to the ER.    Pt feels fine and has no major complaints.  He denies f/c/n/v/confusion/twitching.        ROS:  A complete review of systems was performed and is x as noted above.    PMH:    Past Medical History   Diagnosis Date     Acute venous embolism and thrombosis of other specified veins 11/01     Deep vein thrombophlebitis, filter placed     Cancer (H)      Depressive disorder, not elsewhere classified      Esophageal reflux      Fibromyalgia 1/2009     dx with Dr Benitez( Rheum)     Osteoarthritis      Other and unspecified hyperlipidemia      Other chronic nonalcoholic liver disease      Fatty liver      Other testicular hypofunction      Pneumonia, organism unspecified 10-01     Included ARDS, sepsis, and  acute renal failure; hospitalized     Rheumatoid  arthritis(714.0)      Type II or unspecified type diabetes mellitus without mention of complication, not stated as uncontrolled 11/01     Managed by endocrinology     Unspecified essential hypertension 11/01     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     Unspecified sleep apnea      Uses BiPAP     PSH:    Past Surgical History   Procedure Laterality Date     C nonspecific procedure       tracheostomy     C nonspecific procedure       repair of deviated septum     C nonspecific procedure  2007     Rt knee arthroscopy     C total knee arthroplasty  2008     Right knee arthroscopy     Cholecystectomy       Esophagoscopy, gastroscopy, duodenoscopy (egd), combined N/A 8/4/2016     Procedure: COMBINED ESOPHAGOSCOPY, GASTROSCOPY, DUODENOSCOPY (EGD), BIOPSY SINGLE OR MULTIPLE;  Surgeon: Trent Pederson MD;  Location:  GI     MEDICATIONS:      insulin aspart  20 Units Subcutaneous TID w/meals     insulin aspart  1-10 Units Subcutaneous TID AC     insulin aspart  1-7 Units Subcutaneous At Bedtime     gabapentin  600 mg Oral At Bedtime     omeprazole  20 mg Oral BID AC     rifaximin  550 mg Oral BID     sodium chloride (PF)  3 mL Intracatheter Q8H     insulin glargine  65 Units Subcutaneous QAM AC     ALLERGIES:    Allergies as of 02/14/2017 - Alex as Reviewed 02/14/2017   Allergen Reaction Noted     Erythromycin GI Disturbance 07/08/2002     Vioxx  07/08/2002     FH:    Family History   Problem Relation Age of Onset     DIABETES Father      Hypertension Father      Substance Abuse Father      Arthritis Mother      CANCER Mother      Thyroid     Thyroid Disease Mother      Other Cancer Mother      Colon Cancer No family hx of      Hyperlipidemia No family hx of      Coronary Artery Disease No family hx of      CEREBROVASCULAR DISEASE No family hx of      Breast Cancer No family hx of      Prostate Cancer No family hx of      SH:    Social History     Social History     Marital status:      Spouse name: N/A      Number of children: 1     Years of education: N/A     Occupational History            Social History Main Topics     Smoking status: Former Smoker     Smokeless tobacco: Former User     Types: Chew     Quit date: 10/8/2015      Comment: Has used chewing tobacco since age 15 , chewed for 20yrs      Alcohol use No      Comment: last drink about a year ago (quit 2001)     Drug use: No     Sexual activity: Yes     Partners: Female     Other Topics Concern     Not on file     Social History Narrative    uSED TO BE      Back in school now         PHYSICAL EXAM:    /67 (BP Location: Right arm)  Pulse 76  Temp 97.8  F (36.6  C) (Oral)  Resp 18  Ht 1.829 m (6')  Wt 103.2 kg (227 lb 9.6 oz)  SpO2 98%  BMI 30.87 kg/m2    GENERAL: awake, alert, NAD  HEENT:  Normocephalic. No gross abnormalities.  MMM.  Dentition is ok.  Pupils equal.  EOMI.  + scleral icterus.  CV: RRR c 1/6 murmurs, no clicks, gallops, or rubs, 1+ ble edema.  RESP: Clear bilaterally with good efforts  GI: Abdomen o/s/nt, + distended. BS present. No masses.  MUSCULOSKELETAL: extremities nl - no gross deformities noted  SKIN: no suspicious lesions or rashes, dry to touch  NEURO:  Strength normal and symmetric.   PSYCH: mood good, affect appropriate  LYMPH: No palpable ant/post cervical and supraclavicular adenopathy    LABS:      CBC RESULTS:     Recent Labs  Lab 02/14/17 1746 02/14/17  1621 02/14/17  1615   WBC  --   --  6.4   RBC  --   --  3.26*   HGB 11.2* 11.9* 11.4*   HCT  --   --  32.5*   PLT  --   --  50*     BMP RESULTS:    Recent Labs  Lab 02/15/17  1106 02/15/17  0620 02/14/17  1746 02/14/17  1621 02/14/17  1615 02/14/17  1219   * 123* 124* 123* 124* 123*   POTASSIUM 5.5* 5.2 6.0* 5.9* 5.8*  --    CHLORIDE 87* 91* 88* 88* 90*  --    CO2 23 24  --   --  23  --    BUN 31* 31* 28 28 29  --    CR 0.73 0.74  --   --  0.69 0.71   * 150* 475* 498* 487*  --    JACOB 8.6 8.9  --   --  9.3  --       INR  Recent Labs  Lab 02/14/17  1615 02/14/17  1219   INR 1.61* 1.55*      DIAGNOSTICS:  Personally reviewed CXR    A/P:  Camacho Bhagat is a 51 year old male c hyponatremia and hypokalemia.    1.  Hyponatremia.  Pt's Na is low in the 120s.  He is not having any sx.  Most likely related to his liver dz.      2.  Hyperkalemia.  Pt's high k was multifactorial - meds (lisin, spirono), stopping furos, eating a high k diet, and high sugars.  He is chronically in the higher range, it appears upon review of records.    A.  Low k diet - d/w pt.  B.  Control sugars.  C.  Would not resume ACEI.  D.  When restart spirono, would resume furos, too.  E.  Follow labs.  F.  Would not give bicarb since it will cause more fluid retention.    3.  ESLD 2 CASTAÑEDA.  He is chronically jaundiced.  He is followed by Dr. Camejo.  A.  Follow labs, clinically.    4.  Anemia.  Hb is stable in the 11s.  No signs of bleeding.  A.  Follow labs, clinically.    5.  DM2.  Pt follows c Dr. Eckert and was due to be seen today.  His A1C wasn't too bad in Dec, but he says he has a lot of high sugars, and also gets lows.  A.  Per hospitalist team.    6.  FEN.  Pt is on a diabetic diet.  A.  Change to low k, diabetic diet.    Thank you for this consultation. We will follow c you.  Please call if any questions.    Attestation:   I have reviewed today's relevant vital signs, notes, medications, labs and imaging.    Abdiel De Guzman MD  [facility] - Nephrology  [phone]

## 2017-02-15 NOTE — PROCEDURES
RADIOLOGY PROCEDURE NOTE  Patient name: Camacho Bhagat  MRN: [id number]  : 1965    Pre-procedure diagnosis: Ascites  Post-procedure diagnosis: Same    Procedure Date/Time: February 15, 2017  3:41 PM  Procedure: US guided paracentesis with removal of clear yellow fluid.  No complications.  Estimated blood loss: 10 ml  Specimen(s) collected with description: 50 ml of yellow ascites removed for diagnostic purposes  The patient tolerated the procedure well with no immediate complications.  Significant findings:  Please see above.    See imaging dictation for procedural details.    Provider name: Shay Larson  Assistant(s):None

## 2017-02-15 NOTE — PROGRESS NOTES
[facility] [place]  Hospitalist Progress Note  Name: Camacho Bhagat    MRN: [id number]  Provider:  Serjio Singleton MD  02/15/17    Initial presenting complaint/issue to hospital (Diagnosis): Hyperkalemia.         Assessment and Plan:      Summary of Stay: Camacho Bhagat is a 51 year old male admitted on 2/14/2017 with hx of CASTAÑEDA and resultant liver cirrhosis, DM type 2, who gets paracentesis x 2/week who was referred to ER for increased potassium and high glucose levels.      Problem List:     1. Hyponatremia and Hyperkalemia.  - On fluid restriction.  - Hyponatremia likely due to hypervolemic hypotonic hyponatremia, not responding to fluid restriction.  - Will consult nephrology.  - Aldactone and ACE inhibitor stopped.    2. Liver cirrhosis in setting of markedly elevated T. Kyle in context of HCC.  - Will consult GI.  - ? Worsening of HCC.  - On rifaximin.    3. Type 2 DM.  - On lantus and aspart.  - ADA diet.  - Missed endo appointment today as not stable enough to go due to electrolyte imbalances.      DVT Prophylaxis:  -  PCD's.  Code Status: Full Code  Discharge Dispo: home.  Estimated Disch Date / # of Days until Discharge: 1-2 days.        Interval History:        No fevers/chills/chest pain/SOB/N/V.                  Physical Exam:      Last Vital Signs:  Temp: 97.8  F (36.6  C) Temp src: Oral BP: 151/67 Pulse: 76 Heart Rate: 81 Resp: 18 SpO2: 98 % O2 Device: None (Room air)      Intake/Output Summary (Last 24 hours) at 02/15/17 1228  Last data filed at 02/15/17 0745   Gross per 24 hour   Intake              480 ml   Output                0 ml   Net              480 ml        Vitals:    02/14/17 1540 02/14/17 2101   Weight: 102.5 kg (226 lb) 103.2 kg (227 lb 9.6 oz)       Gen - AAO x 3 in NAD.  HEENT - icteric.  Lungs - CTA B.  Heart  -RR, S1+S2 nml, no m/g/r.  Abd - soft, slightly protuberant, NT, + BS.  Ext - no edema.         Medications:      All current medications were reviewed.         Data:       All new lab and imaging data was reviewed.   Labs:  No results for input(s): CULT in the last 168 hours.    Recent Labs  Lab 02/14/17  1746 02/14/17  1621 02/14/17  1615   WBC  --   --  6.4   HGB 11.2* 11.9* 11.4*   HCT  --   --  32.5*   MCV  --   --  100   PLT  --   --  50*       Recent Labs  Lab 02/15/17  1106 02/15/17  0620 02/14/17  1746  02/14/17  1615 02/14/17  1219   * 123* 124*  < > 124* 123*   POTASSIUM 5.5* 5.2 6.0*  < > 5.8*  --    CHLORIDE 87* 91* 88*  < > 90*  --    CO2 23 24  --   --  23  --    ANIONGAP 10 8 11  < > 11  --    * 150* 475*  < > 487*  --    BUN 31* 31* 28  < > 29  --    CR 0.73 0.74  --   --  0.69 0.71   GFRESTIMATED >90Non  GFR Calc >90Non  GFR Calc 89  < > >90Non  GFR Calc >90Non  GFR Calc   GFRESTBLACK >90African American GFR Calc >90African American GFR Calc >90  < > >90African American GFR Calc >90African American GFR Calc   JACOB 8.6 8.9  --   --  9.3  --    MAG  --   --   --   --  2.2  --    PROTTOTAL  --   --   --   --  6.1*  --    ALBUMIN  --   --   --   --  3.6 3.0*   BILITOTAL  --   --   --   --  29.2* 30.7*   ALKPHOS  --   --   --   --  206*  --    AST  --   --   --   --  126*  --    ALT  --   --   --   --  85*  --    < > = values in this interval not displayed.    Recent Labs  Lab 02/14/17  1615 02/14/17  1219   INR 1.61* 1.55*      Recent Imaging:   Recent Results (from the past 24 hour(s))   US Paracentesis    Narrative    [facility]    PRE-OPERATIVE DIAGNOSIS:  Ascites    POST-OPERATIVE DIAGNOSIS:  Same      PROCEDURE:  Ultrasound guided paracentesis      Impression    IMPRESSION:  Ultrasound guided paracentesis. A total of 6100 mL  ascites fluid aspirated from the abdomen.  No immediate complications.       ----------    CLINICAL HISTORY:  Ascites; ultrasound guided paracentesis  "requested.    PERFORMED BY:  Camacho Root PA-C    CONSENT:  The patient understood the limitations, alternatives, and  risks of the procedure and agreed to the procedure.  Written informed  consent was obtained and is documented in the patient record.    MEDICATIONS:  No intravenous sedation was administered.  A 10:1 volume  mixture of 1% lidocaine without epinephrine buffered with 8.4%  bicarbonate solution was used for local anesthesia.  Intravenous  albumin was available (see nursing documentation for administration  record).      DESCRIPTION:  Ascites fluid was identified on limited abdominal  ultrasound exam and picture is documented in the patient's record.   The abdomen was prepped and draped in the usual sterile fashion.   Local anesthesia was achieved.  Under ultrasound guidance, a 5-Portuguese  centesis needle/catheter was advanced into the peritoneal space with  ascites fluid return.  Needle was removed.  The catheter was connected  to drainage container.  Ascites was aspirated.  Catheter was removed  on completion of drainage and sterile dressing was applied.     COMPLICATIONS:  No immediate concerns; the patient remained stable  throughout the procedure and tolerated it well.    ESTIMATED BLOOD LOSS:  Minimal    SPECIMENS:  per above    -----  \"The physician assistant (PA) who performed this procedure and signed  the above report is licensed to practice in the state of [place]  pursuant to [place] Statute 147[place].09.  This includes meeting the Statute and  [place] Board of [employer] requirement of an active  Delegation Agreement, which documents delegation of services by  primary and alternate supervising physicians.\"   -----    ERIK ROOT PA-C     "

## 2017-02-15 NOTE — PHARMACY-ADMISSION MEDICATION HISTORY
Admission medication history interview status for this patient is complete. See [facility] admission navigator for allergy information, prior to admission medications and immunization status.     Medication history interview source(s):Patient and Family  Medication history resources (including written lists, pill bottles, clinic record):None  [facility]    Changes made to PTA medication list:  Added: -  Deleted: aspirin, metformin, mvi, oxybutynin, protonix, pyridium, phytonadione  Changed: -   Actions taken by pharmacist (provider contacted, etc):None     Additional medication history information:None    Medication reconciliation/reorder completed by provider prior to medication history? No    For patients on insulin therapy: Yes   Lantus - 50 units in AM ??  Sliding scale Novolog  Yes ??  If Yes, do you have a baseline Humalog  pre-meal dose:  [id number] with meals????   Patients eat three meals a day:   Y/N     Any Barriers to therapy:  cost of medications/comfortable with giving injections (if applicable)/ comfortable and confident with current diabetes regimen   Pt confused with his  insulin orders. He states Endocrinologist wanted him to take U500 but the [facility] wanted him to take U100. He states that recently he has been using only Lantus and humalog. He is not sure about how many units he has been taking. First he said he was using only the SS for the Humalog. Asked again he said he was taking Humalog 30 units in AM, 30 units at Lunch and 50 units at Dinner. Lantus he reports has been taking 50 units in AM. Per wife pt is getting confused (dt dx - liver cirrhosis). Dr Penny is aware of confusion and he is going to order for Drip as of now.      Prior to Admission medications    Medication Sig Last Dose Taking? Auth Provider   spironolactone (ALDACTONE) 50 MG tablet Take 1 tablet (50 mg) by mouth daily Past Week at Unknown time Yes Toñito Camejo MD   lisinopril (PRINIVIL/ZESTRIL) 40 MG  tablet Take 40 mg by mouth every evening  2/13/2017 at Unknown time Yes Reported, Patient   rifaximin (XIFAXAN) 550 MG TABS tablet Take 1 tablet (550 mg) by mouth 2 times daily Past Week at Unknown time Yes Toñito Camejo MD   ondansetron (ZOFRAN-ODT) 4 MG ODT tab Take 1 tablet (4 mg) by mouth every 8 hours as needed for nausea  Yes Godwin Willson MD   glucagon 1 MG SOLR injection Inject 1 mg Subcutaneous every 15 minutes as needed for low blood sugar (May repeat x 1 only)  Yes Godwin Willson MD   insulin lispro (HUMALOG) 100 UNIT/ML injection 1 unit per 50 glucose over 150: If blood glucose 150 to 200 give 1 unit, if glucose 200-250 give 2 total units, 250-300 give 3 total units (1+1+1).  Give enough for one week. Past Week at Unknown time Yes Ronaldo Soler MD   lactobacillus rhamnosus, GG, (CULTURELL) capsule Take 1 capsule by mouth 2 times daily 2/14/2017 at x1 Yes Eagle Tejada MD   cyclobenzaprine (FLEXERIL) 10 MG tablet Take 1 tablet (10 mg) by mouth 3 times daily as needed for muscle spasms  Yes Eagle Tejada MD   gabapentin (NEURONTIN) 300 MG capsule Take 2 capsules (600 mg) by mouth At Bedtime 2/13/2017 at Unknown time Yes Eagle Tejada MD   omeprazole (PRILOSEC) 20 MG capsule Take 1 capsule (20 mg) by mouth 2 times daily (before meals) 2/14/2017 at x1 Yes Eagle Tejada MD   furosemide (LASIX) 40 MG tablet Take 1 tablet (40 mg) by mouth daily   Jovany Curiel MD   insulin glargine (LANTUS) 100 UNIT/ML injection Inject 65 Units Subcutaneous every 24 hours   Jovany Curiel MD

## 2017-02-15 NOTE — CONSULTS
"GASTROENTEROLOGY CONSULTATION     Camacho Bhagat  [address] [facility] [address]  51 year old male    Admission Date/Time: 2/14/2017  Primary Care Provider: Shay Kirkpatrick    We were asked to see the patient in consultation by Dr. Singleton for evaluation of cirrhosis, worsening liver tests.        HPI:  Camacho Bhagat is a 51 year old male who has a past medical history of CASTAÑEDA liver cirrhosis as well as HCC. He follows with Hepatology at the [facility] where he has been listed for liver transplant. He currently is on the \"inactive\" list as a cardiac work up is pending and scheduled in a few weeks.      The patient has received chemotherapy and radioembolization for the HCC in the past to help shrink the tumor.  He has not had either of these treatments in the past several months.  He requires frequent large volume paracentesis. He had a paracentesis done yesterday through the [facility] and labs were drawn. He was then called at home and told to go the nearest ER when lab results showed elevated potassium.    In recent months his bilirubin has been increasing. He was last seen by Dr. Camejo 1/23/17 and Dr. Camejo's note states that the reason for liver function deterioration is not clear.    The patient reports to me that he feels well. No fever, no chills.  No cough.  Slightly less appetite in the past few weeks.  Ascites is stable.      ROS: A comprehensive ten point review of systems was negative aside from those in mentioned in the HPI.      MEDICATIONS:   Current Facility-Administered Medications on File Prior to Encounter:  [DISCONTINUED] albumin human 25 % injection 12.5 g     Current Outpatient Prescriptions on File Prior to Encounter:  spironolactone (ALDACTONE) 50 MG tablet Take 1 tablet (50 mg) by mouth daily   lisinopril (PRINIVIL/ZESTRIL) 40 MG tablet Take 40 mg by mouth every evening    rifaximin (XIFAXAN) 550 MG TABS tablet Take 1 tablet (550 mg) by mouth 2 times daily   ondansetron (ZOFRAN-ODT) 4 MG ODT " tab Take 1 tablet (4 mg) by mouth every 8 hours as needed for nausea   glucagon 1 MG SOLR injection Inject 1 mg Subcutaneous every 15 minutes as needed for low blood sugar (May repeat x 1 only)   insulin lispro (HUMALOG) 100 UNIT/ML injection 1 unit per 50 glucose over 150: If blood glucose 150 to 200 give 1 unit, if glucose 200-250 give 2 total units, 250-300 give 3 total units (1+1+1).  Give enough for one week.   lactobacillus rhamnosus, GG, (CULTURELL) capsule Take 1 capsule by mouth 2 times daily   cyclobenzaprine (FLEXERIL) 10 MG tablet Take 1 tablet (10 mg) by mouth 3 times daily as needed for muscle spasms   gabapentin (NEURONTIN) 300 MG capsule Take 2 capsules (600 mg) by mouth At Bedtime   omeprazole (PRILOSEC) 20 MG capsule Take 1 capsule (20 mg) by mouth 2 times daily (before meals)   furosemide (LASIX) 40 MG tablet Take 1 tablet (40 mg) by mouth daily   insulin glargine (LANTUS) 100 UNIT/ML injection Inject 65 Units Subcutaneous every 24 hours       ALLERGIES:   Allergies   Allergen Reactions     Erythromycin GI Disturbance     Vioxx      Nausea, vomiting       Past Medical History   Diagnosis Date     Acute venous embolism and thrombosis of other specified veins 11/01     Deep vein thrombophlebitis, filter placed     Cancer (H)      Depressive disorder, not elsewhere classified      Esophageal reflux      Fibromyalgia 1/2009     dx with Dr Benitez( Rheum)     Osteoarthritis      Other and unspecified hyperlipidemia      Other chronic nonalcoholic liver disease      Fatty liver      Other testicular hypofunction      Pneumonia, organism unspecified 10-01     Included ARDS, sepsis, and  acute renal failure; hospitalized     Rheumatoid arthritis(714.0)      Type II or unspecified type diabetes mellitus without mention of complication, not stated as uncontrolled 11/01     Managed by endocrinology     Unspecified essential hypertension 11/01     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     Unspecified  sleep apnea      Uses BiPAP       Past Surgical History   Procedure Laterality Date     C nonspecific procedure       tracheostomy     C nonspecific procedure       repair of deviated septum     C nonspecific procedure  2007     Rt knee arthroscopy     C total knee arthroplasty  2008     Right knee arthroscopy     Cholecystectomy       Esophagoscopy, gastroscopy, duodenoscopy (egd), combined N/A 8/4/2016     Procedure: COMBINED ESOPHAGOSCOPY, GASTROSCOPY, DUODENOSCOPY (EGD), BIOPSY SINGLE OR MULTIPLE;  Surgeon: Trent Pederson MD;  Location:  GI         SOCIAL HISTORY:  Social History   Substance Use Topics     Smoking status: Former Smoker     Smokeless tobacco: Former User     Types: Chew     Quit date: 10/8/2015      Comment: Has used chewing tobacco since age 15 , chewed for 20yrs      Alcohol use No      Comment: last drink about a year ago (quit 2001)       FAMILY HISTORY:  No known family history of GI disease    PHYSICAL EXAM:   /67 (BP Location: Right arm)  Pulse 76  Temp 97.8  F (36.6  C) (Oral)  Resp 18  Ht 1.829 m (6')  Wt 103.2 kg (227 lb 9.6 oz)  SpO2 98%  BMI 30.87 kg/m2    Constitutional: NAD, comfortable  Cardiovascular: RRR, normal S1 and S2, no r/c/g/m  Respiratory: CTAB  Psychiatric: mentation appears normal and affect normal/bright  Head: Normocephalic. Atraumatic.    Neck: Normal appearing  Eyes:  icteric  ENT: Nhearing adequate  Abdomen:   Auscultation: normal  Appearance: not distended  Palpation: nontender to palpation  NEURO: grossly negative  SKIN: jaundice            ADDITIONAL COMMENTS:   I reviewed the patient's new clinical lab test results.   Recent Labs   Lab Test  02/14/17   1746  02/14/17   1621  02/14/17   1615  02/14/17   1219  02/07/17   1420  02/03/17   1350   01/24/17   1409   WBC   --    --   6.4   --    --   3.8*   --   4.5   HGB  11.2*  11.9*  11.4*   --    --   10.9*   --   11.5*   MCV   --    --   100   --    --   98   --   99   PLT   --    --   50*   --    --    35*   --   44*   INR   --    --   1.61*  1.55*  1.52*  1.68*   < >  1.48*    < > = values in this interval not displayed.     Recent Labs   Lab Test  02/15/17   1106  02/15/17   0620  02/14/17   1746   02/14/17   1615   NA  120*  123*  124*   < >  124*   POTASSIUM  5.5*  5.2  6.0*   < >  5.8*   CHLORIDE  87*  91*  88*   < >  90*   CO2  23  24   --    --   23   BUN  31*  31*  28   < >  29   CR  0.73  0.74   --    --   0.69   ANIONGAP  10  8  11   < >  11   JACOB  8.6  8.9   --    --   9.3   GLC  267*  150*  475*   < >  487*    < > = values in this interval not displayed.     Recent Labs   Lab Test  02/14/17   1615  02/14/17   1219  02/07/17   1420  02/03/17   1350   01/24/17   1409   12/12/16   0150   12/09/16   1830  12/09/16   1159   11/23/16   0917   09/30/10   1734   ALBUMIN  3.6  3.0*  3.3*  3.5   < >  3.0*   < >   --    < >   --   2.7*   < >   --    < >  4.1   BILITOTAL  29.2*  30.7*  18.1*  12.8*   < >  11.4*   < >   --    < >   --   7.6*   < >   --    < >  1.0   ALT  85*   --    --   63   --   64   < >   --    < >   --   52   < >   --    < >  128*   AST  126*   --    --   79*   --   77*   < >   --    < >   --   73*   < >   --    < >  70*   ALKPHOS  206*   --    --   267*   --   316*   < >   --    < >   --   256*   < >   --    < >  287*   PROTEIN   --    --    --    --    --    --    --   30*   --   30*   --    --   10*   < >   --    LIPASE   --    --    --    --    --    --    --    --    --    --   161   --    --    --   138   AMYLASE   --    --    --    --    --    --    --    --    --    --    --    --    --    --   82    < > = values in this interval not displayed.       MELD 24  MELD-Na 30  .    CONSULTATION ASSESSMENT AND PLAN:    Active Problems:   Liver Cirrhosis-HCC    Assessment: Patient with CASTAÑEDA liver cirrhosis and HCC followed by [facility], has been listed for liver transplant but currently inactive pending cardiology work up scheduled in a few weeks.  Presents to [employer] after  being called by [facility] to go to nearest ER for hyperkalemia. Had labs checked yesterday as part of scheduled large volume paracentesis.  Feels fine per his report.  Last Hepatology note by Dr. Camejo reviewed.  No evidence on 1/6/17 of worsening HCC that would explain deterioration in LFTs.  We will check a diagnostic paracentesis for SBP, chest xray and urinalysis to rule out an occult infection which could cause worsening of LFTs.    We will also check urine ethyl glucuronide for alcohol.    I discussed this patient with [facility] Hepatology Fellow Leo Acosta who was aware of the patient's admission.  I also talked to the patient's Transplant coordinator Ralph Morales.  It is not thought that at this point transfer to the [facility] is necessary.  It is likely the patient will be stable for d/c once potassium issues are resolved.       Plan:   - check urine ethyl glucuronide  - urinalysis  - chest xray  - diagnostic paracentesis  - if above work up for occult infection is negative patient can likely d/c when potassium improved with close follow up by the [facility]  -  I did tell the patient that in the future going to the ER at the [facility] would be beneficial due to the status of his liver transplant listing      Caryn Houston MD  [facility]  Office:  [phone]  Cell/Pager:  [phone]

## 2017-02-15 NOTE — PLAN OF CARE
Problem: Goal Outcome Summary  Goal: Goal Outcome Summary  Pt AO, up independently. Denies pain. Lung sounds clear, sats 99% RA. BG in the 500's, insulin drip started. Skin jaundice, sclera yellow, otherwise intact skin. Baseline 2-5 loose BM/day. Had paracentesis today took out 6.1L fluids per pt report. No pitting edema. Tele SR.

## 2017-02-15 NOTE — H&P
IDENTIFICATION AND CHIEF COMPLAINT:  Mr. 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  The patient apparently was called by a provider and directed to the Emergency Department for further management.        HISTORY OF PRESENT ILLNESS:  Mr. Bhagat is chronically somewhat ill and is insulin dependent.  He had a couple of hospitalizations in 12/2016 at which time he had his insulin regimen completely reorganized by the Endocrinology Department at the [facility].  At this point the patient is unable to tell me what his current or previous insulin regimen is.  However, he does indicate to me that he is planning on seeing his endocrinologist, Dr. Ralph Eckert tomorrow at 1:00 p.m.  The patient decided that because he is going to see Dr. Eckert that he wanted to be off of his insulin today so he did not take his Lantus this morning.  He apparently ate an orange and nothing else today prior to going up to the [facility] where he underwent one of his twice weekly scheduled paracenteses.  Apparently, on the Tuesday paracentesis, it is routine for them to draw labs and today patient's potassium value was 6.3 and his total bilirubin was 30.7.  Apparently, the patient has not felt ill in any particular way but followed the directions of Dr. Eckert.        It is noted that Mr. Bhagat has not had significant changes in his usual medications for control of his ascites and total volume overload.  Today, he did have 50 g of albumin administered at the time of 6.1 liters of fluid was removed from his abdomen.        REVIEW OF SYSTEMS:  At this time the patient indicates he has not had fevers, sweats or chills.  He denies upper respiratory tract symptoms.  Apparently he has not eaten much today because with his decision to not take any insulin he  thought it was a good idea to not eat very much.  He denies nausea, vomiting and stool change, though I note that he is on lactulose and chronically has diarrhea from that.  He denies urinary change.  It is noted the patient had a mildly abnormal stress test, though apparently no evidence for stress-induced ischemia.  Nevertheless, the patient has his name put on the inactive list for a liver transplant.  The remainder of the comprehensive 10-system review is negative.      PAST MEDICAL HISTORY:   1.  History of DVT, status post ICD filter placement.   2.  History of cerebrovascular accident in 2001.  That evidently was related to paroxysmal atrial fibrillation that was diagnosed on Holter monitoring.  In the past, the patient has been treated with Coumadin though that is not ongoing at this time.   3.  Non-alcoholic liver disease resulting in end stage liver failure.   4.  Depression.   5.  Obstructive sleep apnea for which the patient's uses BiPap.   6.  Rheumatoid arthritis.   7.  Hepatocellular carcinoma.  The patient is status post chemotherapeutic embolization of the liver in 2016.      PAST SURGICAL HISTORY:     1.  Remote tracheostomy, apparently was undertaken in about 2001 when the patient had a streptococcal infection and severe sepsis.   2.  Remote cholecystectomy.   3.  Remote knee arthroscopy.      SOCIAL HISTORY:  The patient is  and they have 1 adult daughter.  The patient is currently not working.  His wife is the main bread winner for the family.  The patient previously was an airlift craft .  He apparently quit smoking in 2015.  Does not drink alcohol.      FAMILY MEDICAL HISTORY:  Reviewed and considered noncontributory to this hospitalization.      ALLERGIES:  Reported to ERYTHROMYCIN which apparently caused GI disturbance.      MEDICATIONS PRIOR TO THIS HOSPITALIZATION:   1.  Spironolactone 50 mg p.o. daily.   2.  Lisinopril 40 mg p.o. daily.   3.  Rifaximin 550 mg p.o. b.i.d.    4.  Zofran ODT 4 mg q.8 h. as needed.   5.  Insulin Lispro 1 unit for every 15 mg/dL greater than 150.   6.  Lantus 65 units subcutaneously every morning.   7.  Lasix 40 mg p.o. daily.   8.  Omeprazole 20 mg p.o. daily.   9.  Gabapentin 600 mg p.o. each day at bedtime.   10.  Cyclobenzaprine 10 mg p.o. t.i.d. as needed for muscle spasm.      PHYSICAL EXAMINATION:   GENERAL:  Mr. Bhagat is a pleasant and coherent  male resting on a gurney in the Emergency Department.  He is remarkably jaundiced with an obese body habitus.     VITAL SIGNS:  Temperature 98.2, heart rate 76, blood pressure 160s/70s, respirations are unlabored in room air with oxygen saturation greater than 95%.  Estimated weight 100 kg.  Estimated BMI 30.   HEENT:  The cranium is normocephalic and atraumatic.  The eyes are without remarkable conjunctival injection but yellow to orange scleral icterus is noted.  Pupils are equal, round and reactive.  Extraocular motions conjugate.  Nares and oropharynx are free of obvious lesions.  Mucus membranes appear fairly moist.     NECK:  Without remarkable cervical or supraclavicular lymphadenopathy or thyromegaly.   CHEST:  Clear to auscultation in all fields.   HEART:  Regular without rubs, murmurs or gallops.   ABDOMEN:  Soft, nontender without appreciably hepatosplenomegaly.  Bowel sounds are normoactive.  I did not test for a fluid wave assuming that it is present.    GENITOURINARY:  Examination was deferred.   MUSCULOSKELETAL:  There is no remarkable tenderness over the posterior spinous processes or paraspinal muscles.  No deformities of the extremities.  No remarkable pitting edema over the ankles bilaterally.  Distal pulses are difficult to palpate over the ankles but are easily palpated in the radials bilaterally.     SKIN:  Significantly jaundiced but otherwise without remarkable lesions.   NEUROLOGIC:  The patient is alert and oriented x3, though I find it is a little surprising that he  cannot tell me more information about his insulin regimen.  I wonder if there is some degree of underlying encephalopathy that never quite resolves.  Otherwise though muscle tone and bulk appears to be within normal limits throughout.  Cranial nerves II-XII are intact.      DATA:  Comprehensive metabolic panel obtained today upon arrival to the Emergency Department shows a sodium of 124, potassium 5.8, chloride 90.  Glucose 490, creatinine 0.69.  Bilirubin is 30, alkaline phosphatase 206, ALT 85, .  Hemoglobin 11.2, white count 6.4, platelet count 50,000, INR of 1.6.      ASSESSMENT:  Mr. Bhagat is a 51-year-old man with end stage liver disease who came to attention today at the request of his physician at the [facility] primarily for hyperkalemia.  He was also found to have significantly elevated bilirubin (above his baseline).  I suspect that the lisinopril and spironolactone predisposed the patient to significant hyperkalemia and, of course, discontinuing his insulin and running his glucoses nearly 500 are also contributing to hyperkalemia.  Unfortunately, the patient is unable to tell me much information about his insulin regimen but it appears likely that he will be reconsidering his insulin regimen tomorrow with Dr. Eckert in any case.        I note that the patient is completely asymptomatic with his elevated potassium. I requested an EKG which shows no significant abnormality and in particular no ST-T wave abnormality such as peaked T waves.  The patient's intervals are normal with slight left axis deviation.        DIAGNOSES:   1.  Hyperkalemia.  This is multifactorial but seems to be unassociated with any major symptoms at this point.   2.  End stage liver disease with remarkably elevated bilirubin value.  There is no evidence of cholestatic jaundice at this point.  The alkaline phosphatase and liver function tests are really within the range of the patient's usual values.  It would be  reasonable to ask Dr. Eckert if there is anything he would like us to do about arranging follow up in regard to this particular issue though.   3. Insulin-dependent diabetes.  This has been poorly controlled recently and part of that is that the patient received conflicting regimens from the [facility] and from his primary endocrinologist.  It certainly seems most appropriate to defer any longstanding changes to Dr. Eckert when the patient sees him tomorrow.   4.  Probably mild hepatic encephalopathy.  Unclear if this is different from baseline.  I think that patient's family probably needs to be engaged in the patient's medical care somewhat more as the patient's liver failure and waxing and waning cognition with encephalopathy probably will be better managed in conjunction with a caregiver who is not expected to have fluctuation in cognition.      PLAN:   1.  Admit to observation.   2.  Given the very high glucose and the evidently associated elevated potassium, the patient will be managed on an insulin drip overnight.  We will resume the patient's Lantus in the morning and discontinue the drip obviously on discharge.   3.  We will avoid additional fluids at this time in the absence of rising potassium.     4.  Withhold spironolactone and lisinopril until these medications can be reconsidered.  It seems reasonable that the patient would be on a different agent such as amlodipine to control the blood pressure and perhaps a lower dose of lisinopril would be reasonable.         KATJA GUSMAN MD             D: 2017 21:15   T: 2017 23:34   MT: MARY LOU#[id number]      Name:     RONNIE ARIZMENDI   MRN:      [id number]        Account:      [id number]   :      1965           Admitted:     [id number]      Document: [id number]

## 2017-02-15 NOTE — PROGRESS NOTES
Diagnostic paracentesis completed for 50 cc per physicians order, fluid to lab for testing as ordered. Patient transferred to room via wheelchair in stable condition.

## 2017-02-15 NOTE — PLAN OF CARE
Problem: Goal Outcome Summary  Goal: Goal Outcome Summary  Outcome: No Change  VS stable. Complained of all over pain and pain med's and muscle relaxer given. Diminished LS. Tele is sinus rhythm. Slept on and off throughout the night.

## 2017-02-16 VITALS
DIASTOLIC BLOOD PRESSURE: 43 MMHG | HEIGHT: 72 IN | WEIGHT: 227.6 LBS | HEART RATE: 76 BPM | BODY MASS INDEX: 30.83 KG/M2 | TEMPERATURE: 96.7 F | SYSTOLIC BLOOD PRESSURE: 114 MMHG | RESPIRATION RATE: 20 BRPM | OXYGEN SATURATION: 99 %

## 2017-02-16 LAB
ALBUMIN SERPL-MCNC: 2.8 G/DL (ref 3.4–5)
ALBUMIN UR-MCNC: NEGATIVE MG/DL
ALP SERPL-CCNC: 221 U/L (ref 40–150)
ALT SERPL W P-5'-P-CCNC: 83 U/L (ref 0–70)
AMORPH CRY #/AREA URNS HPF: ABNORMAL /HPF
ANION GAP SERPL CALCULATED.3IONS-SCNC: 9 MMOL/L (ref 3–14)
APPEARANCE UR: ABNORMAL
AST SERPL W P-5'-P-CCNC: 152 U/L (ref 0–45)
BACTERIA #/AREA URNS HPF: ABNORMAL /HPF
BILIRUB DIRECT SERPL-MCNC: 19.9 MG/DL (ref 0–0.2)
BILIRUB SERPL-MCNC: 25.4 MG/DL (ref 0.2–1.3)
BILIRUB UR QL STRIP: ABNORMAL
BUN SERPL-MCNC: 35 MG/DL (ref 7–30)
CALCIUM SERPL-MCNC: 8.5 MG/DL (ref 8.5–10.1)
CHLORIDE SERPL-SCNC: 92 MMOL/L (ref 94–109)
CO2 SERPL-SCNC: 23 MMOL/L (ref 20–32)
COLOR UR AUTO: ABNORMAL
CREAT SERPL-MCNC: 0.84 MG/DL (ref 0.66–1.25)
ERYTHROCYTE [DISTWIDTH] IN BLOOD BY AUTOMATED COUNT: 14.9 % (ref 10–15)
GFR SERPL CREATININE-BSD FRML MDRD: ABNORMAL ML/MIN/1.7M2
GLUCOSE BLDC GLUCOMTR-MCNC: 176 MG/DL (ref 70–99)
GLUCOSE BLDC GLUCOMTR-MCNC: 184 MG/DL (ref 70–99)
GLUCOSE BLDC GLUCOMTR-MCNC: 218 MG/DL (ref 70–99)
GLUCOSE BLDC GLUCOMTR-MCNC: 329 MG/DL (ref 70–99)
GLUCOSE SERPL-MCNC: 211 MG/DL (ref 70–99)
GLUCOSE UR STRIP-MCNC: 300 MG/DL
HBA1C MFR BLD: 9.4 % (ref 4.3–6)
HCT VFR BLD AUTO: 32.8 % (ref 40–53)
HGB BLD-MCNC: 11.8 G/DL (ref 13.3–17.7)
HGB UR QL STRIP: ABNORMAL
KETONES UR STRIP-MCNC: NEGATIVE MG/DL
LEUKOCYTE ESTERASE UR QL STRIP: ABNORMAL
MCH RBC QN AUTO: 35.6 PG (ref 26.5–33)
MCHC RBC AUTO-ENTMCNC: 36 G/DL (ref 31.5–36.5)
MCV RBC AUTO: 99 FL (ref 78–100)
MUCOUS THREADS #/AREA URNS LPF: PRESENT /LPF
NITRATE UR QL: NEGATIVE
PH UR STRIP: 5.5 PH (ref 5–7)
PLATELET # BLD AUTO: 42 10E9/L (ref 150–450)
POTASSIUM SERPL-SCNC: 4.9 MMOL/L (ref 3.4–5.3)
PROT SERPL-MCNC: 5.2 G/DL (ref 6.8–8.8)
RBC # BLD AUTO: 3.31 10E12/L (ref 4.4–5.9)
RBC #/AREA URNS AUTO: 1 /HPF (ref 0–2)
SODIUM SERPL-SCNC: 124 MMOL/L (ref 133–144)
SODIUM SERPL-SCNC: 124 MMOL/L (ref 133–144)
SP GR UR STRIP: 1.02 (ref 1–1.03)
SQUAMOUS #/AREA URNS AUTO: 1 /HPF (ref 0–1)
URN SPEC COLLECT METH UR: ABNORMAL
UROBILINOGEN UR STRIP-MCNC: 2 MG/DL (ref 0–2)
WBC # BLD AUTO: 6.4 10E9/L (ref 4–11)
WBC #/AREA URNS AUTO: <1 /HPF (ref 0–2)

## 2017-02-16 PROCEDURE — 00000146 ZZHCL STATISTIC GLUCOSE BY METER IP

## 2017-02-16 PROCEDURE — 82248 BILIRUBIN DIRECT: CPT | Performed by: INTERNAL MEDICINE

## 2017-02-16 PROCEDURE — 36415 COLL VENOUS BLD VENIPUNCTURE: CPT | Performed by: INTERNAL MEDICINE

## 2017-02-16 PROCEDURE — 96372 THER/PROPH/DIAG INJ SC/IM: CPT

## 2017-02-16 PROCEDURE — 80321 ALCOHOLS BIOMARKERS 1OR 2: CPT | Performed by: INTERNAL MEDICINE

## 2017-02-16 PROCEDURE — 25000132 ZZH RX MED GY IP 250 OP 250 PS 637: Performed by: INTERNAL MEDICINE

## 2017-02-16 PROCEDURE — 99217 ZZC OBSERVATION CARE DISCHARGE: CPT | Performed by: INTERNAL MEDICINE

## 2017-02-16 PROCEDURE — 83036 HEMOGLOBIN GLYCOSYLATED A1C: CPT | Performed by: INTERNAL MEDICINE

## 2017-02-16 PROCEDURE — 25000131 ZZH RX MED GY IP 250 OP 636 PS 637: Performed by: INTERNAL MEDICINE

## 2017-02-16 PROCEDURE — G0378 HOSPITAL OBSERVATION PER HR: HCPCS

## 2017-02-16 PROCEDURE — 80053 COMPREHEN METABOLIC PANEL: CPT | Performed by: INTERNAL MEDICINE

## 2017-02-16 PROCEDURE — 81001 URINALYSIS AUTO W/SCOPE: CPT | Performed by: INTERNAL MEDICINE

## 2017-02-16 PROCEDURE — 85027 COMPLETE CBC AUTOMATED: CPT | Performed by: INTERNAL MEDICINE

## 2017-02-16 RX ORDER — FUROSEMIDE 40 MG
40 TABLET ORAL DAILY
Qty: 30 TABLET | Refills: 0 | Status: ON HOLD | OUTPATIENT
Start: 2017-02-16 | End: 2017-03-10

## 2017-02-16 RX ADMIN — OMEPRAZOLE 20 MG: 20 CAPSULE, DELAYED RELEASE ORAL at 10:00

## 2017-02-16 RX ADMIN — RIFAXIMIN 550 MG: 550 TABLET ORAL at 09:58

## 2017-02-16 RX ADMIN — INSULIN GLARGINE 65 UNITS: 100 INJECTION, SOLUTION SUBCUTANEOUS at 09:59

## 2017-02-16 NOTE — DISCHARGE SUMMARY
[facility]  Discharge Summary        Camacho Bhagat MRN# [id number]   YOB: 1965 Age: 51 year old     Date of Admission:  2/14/2017  Date of Discharge:  2/16/2017  Admitting Physician:  Nitish Bui MD  Discharge Physician: Serjio Singleton MD  Discharging Service: Hospitalist     Primary Provider: Shay Kirkpatrick  Primary Care Physician Phone Number: [phone]         Discharge Diagnoses/Problem Oriented Hospital Course (Providers):    Camacho Bhagat was admitted on 2/14/2017 by Nitish Bui MD and I would refer you to their history and physical.  The following problems were addressed during his hospitalization:    1. Hyponatremia and Hyperkalemia.  - On fluid restriction.  - Hyponatremia likely due to hypervolemic hypotonic hyponatremia,  responding to fluid restriction.  - Will consult nephrology.  - Aldactone and ACE inhibitor stopped.     2. Liver cirrhosis in setting of markedly elevated T. Kyle in context of HCC.  - Will consult GI.  - ? Worsening of HCC.  - On rifaximin.  - Will f/u as out-pt. Needs MRI liver.     3. Type 2 DM.  - On lantus and aspart.  - ADA diet.     IDENTIFICATION AND CHIEF COMPLAINT: Mr. 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 The patient apparently was called by a provider and directed to the Emergency Department for further management.       HISTORY OF PRESENT ILLNESS: Mr. Bhagat is chronically somewhat ill and is insulin dependent. He had a couple of hospitalizations in 12/2016 at which time he had his insulin regimen completely reorganized by the Endocrinology Department at the [facility]. At this point the patient is unable to tell me what his current or previous insulin regimen is. However, he does indicate to me that he is planning on seeing his endocrinologist, Dr. Rosas  Tomeka tomorrow at 1:00 p.m. The patient decided that because he is going to see Dr. Eckert that he wanted to be off of his insulin today so he did not take his Lantus this morning. He apparently ate an orange and nothing else today prior to going up to the [facility] where he underwent one of his twice weekly scheduled paracenteses. Apparently, on the Tuesday paracentesis, it is routine for them to draw labs and today patient's potassium value was 6.3 and his total bilirubin was 30.7. Apparently, the patient has not felt ill in any particular way but followed the directions of Dr. Eckert.       It is noted that Mr. Bhagat has not had significant changes in his usual medications for control of his ascites and total volume overload. Today, he did have 50 g of albumin administered at the time of 6.1 liters of fluid was removed from his abdomen.     Patient was admitted under observation status. He was placed on an insulin drip.  He was seen by gastroenterology as well as nephrology for electrolyte imbalances and abnormal LFTs.  He was afebrile over here.  He did have a paracentesis which does not show any evidence of SBP.  Chest x-ray did not show any infiltrate. We will discontinue  his lisinopril. He should follow up with transplant hepatology at the [facility]  And his primary care physician for electrolyte check.        Code Status:      Full Code        Brief Hospital Stay Summary Sent Home With Patient in AVS:                Important Results:      Liver Function Tests, sodium, potassium.         Pending Results:        Unresulted Labs Ordered in the Past 30 Days of this Admission     No orders found from 12/16/2016 to 2/15/2017.            Discharge Instructions and Follow-Up:      Follow-up Appointments     Follow-up and recommended labs and tests        Follow up with primary care provider, Shay Kirkpatrick, within 7 days for   hospital follow- up.  The following labs/tests are recommended: cmp.     Follow up with hepatology at [facility] for elevated bilirubin in 1-2 weeks.                      Discharge Disposition:      Discharged to home         Discharge Medications:        Current Discharge Medication List      CONTINUE these medications which have CHANGED    Details   insulin glargine (LANTUS) 100 UNIT/ML injection Inject 65 Units Subcutaneous every morning  Qty: 19.5 mL, Refills: 0    Associated Diagnoses: Type 2 diabetes mellitus with other specified complication, with long-term current use of insulin (H)      insulin lispro (HUMALOG) 100 UNIT/ML injection Inject 20 Units Subcutaneous 3 times daily (before meals)  Qty: 1 vial, Refills: 0    Associated Diagnoses: Type 2 diabetes mellitus with other specified complication, with long-term current use of insulin (H)      furosemide (LASIX) 40 MG tablet Take 1 tablet (40 mg) by mouth daily  Qty: 30 tablet, Refills: 0    Associated Diagnoses: Cirrhosis of liver with ascites, unspecified hepatic cirrhosis type (H); Essential hypertension; Hepatic cirrhosis, unspecified hepatic cirrhosis type (H)         CONTINUE these medications which have NOT CHANGED    Details   spironolactone (ALDACTONE) 50 MG tablet Take 1 tablet (50 mg) by mouth daily  Qty: 90 tablet, Refills: 3    Associated Diagnoses: Hepatic cirrhosis, unspecified hepatic cirrhosis type (H)      rifaximin (XIFAXAN) 550 MG TABS tablet Take 1 tablet (550 mg) by mouth 2 times daily  Qty: 60 tablet, Refills: 11    Associated Diagnoses: Hepatic encephalopathy (H)      ondansetron (ZOFRAN-ODT) 4 MG ODT tab Take 1 tablet (4 mg) by mouth every 8 hours as needed for nausea  Qty: 30 tablet, Refills: 0    Associated Diagnoses: Cirrhosis of liver with ascites, unspecified hepatic cirrhosis type (H); Nausea      glucagon 1 MG SOLR injection Inject 1 mg Subcutaneous every 15 minutes as needed for low blood sugar (May repeat x 1 only)  Qty: 1 each, Refills: 0    Associated Diagnoses: Hypoglycemia      lactobacillus  rhamnosus, GG, (CULTURELL) capsule Take 1 capsule by mouth 2 times daily  Qty: 60 capsule, Refills: 0    Associated Diagnoses: Imbalanced nutrition      cyclobenzaprine (FLEXERIL) 10 MG tablet Take 1 tablet (10 mg) by mouth 3 times daily as needed for muscle spasms  Qty: 30 tablet, Refills: 0    Associated Diagnoses: Bladder spasms      gabapentin (NEURONTIN) 300 MG capsule Take 2 capsules (600 mg) by mouth At Bedtime  Qty: 60 capsule, Refills: 0    Associated Diagnoses: Type 2 diabetes mellitus with diabetic polyneuropathy, unspecified long term insulin use status (H)      omeprazole (PRILOSEC) 20 MG capsule Take 1 capsule (20 mg) by mouth 2 times daily (before meals)  Qty: 60 capsule, Refills: 0    Associated Diagnoses: Gastroesophageal reflux disease without esophagitis         STOP taking these medications       lisinopril (PRINIVIL/ZESTRIL) 40 MG tablet Comments:   Reason for Stopping:                 Allergies:         Allergies   Allergen Reactions     Erythromycin GI Disturbance     Vioxx      Nausea, vomiting           Consultations This Hospital Stay:      Consultation during this admission received from gastroenterology and nephrology         Condition and Physical on Discharge:      Discharge condition: Stable   Vitals: Blood pressure 114/43, pulse 76, temperature 96.7  F (35.9  C), temperature source Axillary, resp. rate 20, height 1.829 m (6'), weight 103.2 kg (227 lb 9.6 oz), SpO2 99 %.  227 lbs 9.6 oz        Gen - AAO x 3 in NAD.  HEENT - icteric.  Lungs - CTA B.  Heart -RR, S1+S2 nml, no m/g/r.  Abd - soft, slightly protuberant, NT, + BS.  Ext - no edema.         Discharge Time:        > 30 mins on chart review, exam and .        Image Results From This Hospital Stay (For Non-EPIC Providers):        Results for orders placed or performed during the hospital encounter of 02/14/17   US Paracentesis    Narrative    ULTRASOUND PARACENTESIS 2/15/2017 4:19 PM    HISTORY: 51-year-old patient with  request made for therapeutic  paracentesis.    TECHNIQUE: Patient was brought to the Ultrasound Department and  informed consent obtained. Ultrasound images demonstrated a relatively  large volume of intraperitoneal ascites. Request made for diagnostic  paracentesis.    TECHNIQUE: Patient was brought to the Ultrasound Department and  informed consent obtained. Patient was placed in a supine position.  Skin overlying the right abdomen was prepped and draped in standard  sterile fashion. 1% lidocaine was used for local anesthesia for a  total of 10 mL. With continuous ultrasound guidance, a 5 [place] Island[place] needle  and catheter was advanced into the ascites. 50 mL of clear yellow  fluid was aspirated and sent to the lab for evaluation. Patient  tolerated the procedure well.      Impression    IMPRESSION: Uncomplicated diagnostic paracentesis, with 50 mL fluid  drained from the right abdomen.    DAQUAN CALI MD   XR Chest 2 Views    Narrative    CHEST TWO VIEWS  2/15/2017 2:22 PM     HISTORY: 51-year-old male with increasing bilirubin and history of  liver disease.       Impression    IMPRESSION: Since December 9, 2016, there are scattered minimal  bibasilar opacities most suggestive of atelectasis. No pleural  effusion, pneumothorax, or abnormal area of consolidation.    DAQUAN CALI MD           Most Recent Lab Results In EPIC (For Non-EPIC Providers):      Results for orders placed or performed during the hospital encounter of 02/14/17 (from the past 24 hour(s))   Glucose by meter   Result Value Ref Range    Glucose 213 (H) 70 - 99 mg/dL   Glucose by meter   Result Value Ref Range    Glucose 263 (H) 70 - 99 mg/dL   Basic metabolic panel   Result Value Ref Range    Sodium 120 (L) 133 - 144 mmol/L    Potassium 5.5 (H) 3.4 - 5.3 mmol/L    Chloride 87 (L) 94 - 109 mmol/L    Carbon Dioxide 23 20 - 32 mmol/L    Anion Gap 10 3 - 14 mmol/L    Glucose 267 (H) 70 - 99 mg/dL    Urea Nitrogen 31 (H) 7 - 30 mg/dL    Creatinine  "0.73 0.66 - 1.25 mg/dL    GFR Estimate >90  Non  GFR Calc   >60 mL/min/1.7m2    GFR Estimate If Black >90   GFR Calc   >60 mL/min/1.7m2    Calcium 8.6 8.5 - 10.1 mg/dL   Glucose by meter   Result Value Ref Range    Glucose 271 (H) 70 - 99 mg/dL   Gastroenterology IP Consult: hx of liver cirrhosis and HCC, now with markedly elevated T Kyle.; Consultant may enter orders: Yes; Patient to be seen: Routine - within 24 hours; Requested Clinic[facility]Group: [facility]    Caryn Mcneill MD     2/15/2017  1:57 PM  GASTROENTEROLOGY CONSULTATION     Camacho Bhagat  [address] [place] [facility] [facility] [id number]  51 year old male    Admission Date/Time: 2/14/2017  Primary Care Provider: Shay Kirkpatrick    We were asked to see the patient in consultation by Dr. Singleton   for evaluation of cirrhosis, worsening liver tests.        HPI:  Camacho Bhagat is a 51 year old male who has a past medical   history of CASTA[place]EDA liver cirrhosis as well as HCC. He follows with   Hepatology at the [facility] where he has been listed for liver   transplant. He currently is on the \"inactive\" list as a cardiac   work up is pending and scheduled in a few weeks.      The patient has received chemotherapy and radioembolization for   the HCC in the past to help shrink the tumor.  He has not had   either of these treatments in the past several months.  He   requires frequent large volume paracentesis. He had a   paracentesis done yesterday through the [facility] and labs were   drawn. He was then called at home and told to go the nearest ER   when lab results showed elevated potassium.    In recent months his bilirubin has been increasing. He was last   seen by Dr. Camejo 1/23/17 and Dr. Camejo's note states that the   reason for liver function deterioration is not clear.    The patient reports to me that he feels well. No fever, no   chills.  No cough.  Slightly less appetite in the past few weeks.    Ascites is " stable.      ROS: A comprehensive ten point review of systems was negative   aside from those in mentioned in the HPI.      MEDICATIONS:   Current Facility-Administered Medications on File Prior to   Encounter:  [DISCONTINUED] albumin human 25 % injection 12.5 g     Current Outpatient Prescriptions on File Prior to Encounter:  spironolactone (ALDACTONE) 50 MG tablet Take 1 tablet (50 mg) by   mouth daily   lisinopril (PRINIVIL/ZESTRIL) 40 MG tablet Take 40 mg by mouth   every evening    rifaximin (XIFAXAN) 550 MG TABS tablet Take 1 tablet (550 mg) by   mouth 2 times daily   ondansetron (ZOFRAN-ODT) 4 MG ODT tab Take 1 tablet (4 mg) by   mouth every 8 hours as needed for nausea   glucagon 1 MG SOLR injection Inject 1 mg Subcutaneous every 15   minutes as needed for low blood sugar (May repeat x 1 only)   insulin lispro (HUMALOG) 100 UNIT/ML injection 1 unit per 50   glucose over 150: If blood glucose 150 to 200 give 1 unit, if   glucose 200-250 give 2 total units, 250-300 give 3 total units   (1+1+1).  Give enough for one week.   lactobacillus rhamnosus, GG, (CULTURELL) capsule Take 1 capsule   by mouth 2 times daily   cyclobenzaprine (FLEXERIL) 10 MG tablet Take 1 tablet (10 mg) by   mouth 3 times daily as needed for muscle spasms   gabapentin (NEURONTIN) 300 MG capsule Take 2 capsules (600 mg) by   mouth At Bedtime   omeprazole (PRILOSEC) 20 MG capsule Take 1 capsule (20 mg) by   mouth 2 times daily (before meals)   furosemide (LASIX) 40 MG tablet Take 1 tablet (40 mg) by mouth   daily   insulin glargine (LANTUS) 100 UNIT/ML injection Inject 65 Units   Subcutaneous every 24 hours       ALLERGIES:   Allergies   Allergen Reactions     Erythromycin GI Disturbance     Vioxx      Nausea, vomiting       Past Medical History   Diagnosis Date     Acute venous embolism and thrombosis of other specified veins   11/01     Deep vein thrombophlebitis, filter placed     Cancer (H)      Depressive disorder, not elsewhere  classified      Esophageal reflux      Fibromyalgia 1/2009     dx with Dr Benitez( Rheum)     Osteoarthritis      Other and unspecified hyperlipidemia      Other chronic nonalcoholic liver disease      Fatty liver      Other testicular hypofunction      Pneumonia, organism unspecified 10-01     Included ARDS, sepsis, and  acute renal failure; hospitalized     Rheumatoid arthritis(714.0)      Type II or unspecified type diabetes mellitus without mention   of complication, not stated as uncontrolled 11/01     Managed by endocrinology     Unspecified essential hypertension 11/01     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     Unspecified sleep apnea      Uses BiPAP       Past Surgical History   Procedure Laterality Date     C nonspecific procedure       tracheostomy     C nonspecific procedure       repair of deviated septum     C nonspecific procedure  2007     Rt knee arthroscopy     C total knee arthroplasty  2008     Right knee arthroscopy     Cholecystectomy       Esophagoscopy, gastroscopy, duodenoscopy (egd), combined N/A   8/4/2016     Procedure: COMBINED ESOPHAGOSCOPY, GASTROSCOPY, DUODENOSCOPY   (EGD), BIOPSY SINGLE OR MULTIPLE;  Surgeon: Trent Pederson MD;  Location:  GI         SOCIAL HISTORY:  Social History   Substance Use Topics     Smoking status: Former Smoker     Smokeless tobacco: Former User     Types: Chew     Quit date: 10/8/2015      Comment: Has used chewing tobacco since age 15 , chewed for   20yrs      Alcohol use No      Comment: last drink about a year ago (quit 2001)       FAMILY HISTORY:  No known family history of GI disease    PHYSICAL EXAM:   /67 (BP Location: Right arm)  Pulse 76  Temp 97.8  F (36.6    C) (Oral)  Resp 18  Ht 1.829 m (6')  Wt 103.2 kg (227 lb 9.6 oz)    SpO2 98%  BMI 30.87 kg/m2    Constitutional: NAD, comfortable  Cardiovascular: RRR, normal S1 and S2, no r/c/g/m  Respiratory: CTAB  Psychiatric: mentation appears normal and affect normal/bright  Head:  Normocephalic. Atraumatic.    Neck: Normal appearing  Eyes:  icteric  ENT: Nhearing adequate  Abdomen:   Auscultation: normal  Appearance: not distended  Palpation: nontender to palpation  NEURO: grossly negative  SKIN: jaundice            ADDITIONAL COMMENTS:   I reviewed the patient's new clinical lab test results.   Recent Labs   Lab Test  02/14/17   1746  02/14/17   1621  02/14/17   1615  02/14/17   1219  02/07/17   1420  02/03/17   1350   01/24/17   1409   WBC   --    --   6.4   --    --   3.8*   --   4.5   HGB  11.2*  11.9*  11.4*   --    --   10.9*   --   11.5*   MCV   --    --   100   --    --   98   --   99   PLT   --    --   50*   --    --   35*   --   44*   INR   --    --   1.61*  1.55*  1.52*  1.68*   < >  1.48*    < > = values in this interval not displayed.     Recent Labs   Lab Test  02/15/17   1106  02/15/17   0620  02/14/17   1746   02/14/17   1615   NA  120*  123*  124*   < >  124*   POTASSIUM  5.5*  5.2  6.0*   < >  5.8*   CHLORIDE  87*  91*  88*   < >  90*   CO2  23  24   --    --   23   BUN  31*  31*  28   < >  29   CR  0.73  0.74   --    --   0.69   ANIONGAP  10  8  11   < >  11   JACOB  8.6  8.9   --    --   9.3   GLC  267*  150*  475*   < >  487*    < > = values in this interval not displayed.     Recent Labs   Lab Test  02/14/17   1615  02/14/17   1219  02/07/17   1420  02/03/17   1350   01/24/17   1409   12/12/16   0150   12/09/16   1830  12/09/16   1159   11/23/16   0917   09/30/10   1734   ALBUMIN  3.6  3.0*  3.3*  3.5   < >  3.0*   < >   --    < >   --     2.7*   < >   --    < >  4.1   BILITOTAL  29.2*  30.7*  18.1*  12.8*   < >  11.4*   < >   --      < >   --   7.6*   < >   --    < >  1.0   ALT  85*   --    --   63   --   64   < >   --    < >   --   52     < >   --    < >  128*   AST  126*   --    --   79*   --   77*   < >   --    < >   --     73*   < >   --    < >  70*   ALKPHOS  206*   --    --   267*   --   316*   < >   --    < >     --   256*   < >   --    < >  287*   PROTEIN   --     --    --    --    --    --    --   30*   --   30*     --    --   10*   < >   --    LIPASE   --    --    --    --    --    --    --    --    --    --     161   --    --    --   138   AMYLASE   --    --    --    --    --    --    --    --    --      --    --    --    --    --   82    < > = values in this interval not displayed.       MELD 24  MELD-Na 30  .    CONSULTATION ASSESSMENT AND PLAN:    Active Problems:   Liver Cirrhosis-HCC    Assessment: Patient with CASTA[place]EDA liver cirrhosis and HCC followed   by [facility] Hepatology Transplant, has been listed for liver   transplant but currently inactive pending cardiology work up   scheduled in a few weeks.  Presents to [facility] after being called   by [facility] to go to nearest ER for hyperkalemia. Had labs checked   yesterday as part of scheduled large volume paracentesis.  Feels   fine per his report.  Last Hepatology note by Dr. Camejo reviewed.    No evidence on 1/6/17 of worsening HCC that would explain   deterioration in LFTs.  We will check a diagnostic paracentesis   for SBP, chest xray and urinalysis to rule out an occult   infection which could cause worsening of LFTs.    We will also check urine ethyl glucuronide for alcohol.    I discussed this patient with [facility] Hepatology Fellow Leo Acosta   who was aware of the patient's admission.  I also talked to the   patient's Transplant coordinator Raplh Morales.  It is not thought   that at this point transfer to the [facility] is necessary.  It is   likely the patient will be stable for d/c once potassium issues   are resolved.       Plan:   - check urine ethyl glucuronide  - urinalysis  - chest xray  - diagnostic paracentesis  - if above work up for occult infection is negative patient can   likely d/c when potassium improved with close follow up by the [facility]  -  I did tell the patient that in the future going to the ER at   the [facility] would be beneficial due to the status of his liver   transplant listing      Caryn Houston  MD  [facility]  Office:  [phone]  Cell/Pager:  [phone]         XR Chest 2 Views    Narrative    CHEST TWO VIEWS  2/15/2017 2:22 PM     HISTORY: 51-year-old male with increasing bilirubin and history of  liver disease.       Impression    IMPRESSION: Since December 9, 2016, there are scattered minimal  bibasilar opacities most suggestive of atelectasis. No pleural  effusion, pneumothorax, or abnormal area of consolidation.    DAQUAN CALI MD   Cell count with differential fluid   Result Value Ref Range    Body Fluid Analysis Source Fluid   Ascites       % Neutrophils Fluid 5 %    % Lymphocytes Fluid 54 %    % Mono/Macro Fluid 40 %    % Other Cells Fluid 1 %    Color Fluid Yellow     Appearance Fluid Slightly Cloudy     WBC Fluid 258 /uL   Albumin fluid   Result Value Ref Range    Albumin Fluid Source Fluid   Ascites       Albumin Fluid 0.3 g/dL   Protein fluid   Result Value Ref Range    Protein Total Fluid Source Fluid   Ascites       Protein Total Fluid 0.4 g/dL   US Paracentesis    Narrative    ULTRASOUND PARACENTESIS 2/15/2017 4:19 PM    HISTORY: 51-year-old patient with request made for therapeutic  paracentesis.    TECHNIQUE: Patient was brought to the Ultrasound Department and  informed consent obtained. Ultrasound images demonstrated a relatively  large volume of intraperitoneal ascites. Request made for diagnostic  paracentesis.    TECHNIQUE: Patient was brought to the Ultrasound Department and  informed consent obtained. Patient was placed in a supine position.  Skin overlying the right abdomen was prepped and draped in standard  sterile fashion. 1% lidocaine was used for local anesthesia for a  total of 10 mL. With continuous ultrasound guidance, a 5 [place]oth[place] needle  and catheter was advanced into the ascites. 50 mL of clear yellow  fluid was aspirated and sent to the lab for evaluation. Patient  tolerated the procedure well.      Impression    IMPRESSION: Uncomplicated  diagnostic paracentesis, with 50 mL fluid  drained from the right abdomen.    DAQUAN CALI MD   Albumin level   Result Value Ref Range    Albumin 3.0 (L) 3.4 - 5.0 g/dL   Potassium   Result Value Ref Range    Potassium 5.2 3.4 - 5.3 mmol/L   Glucose by meter   Result Value Ref Range    Glucose 290 (H) 70 - 99 mg/dL   Glucose by meter   Result Value Ref Range    Glucose 305 (H) 70 - 99 mg/dL   Sodium   Result Value Ref Range    Sodium 121 (L) 133 - 144 mmol/L   Glucose by meter   Result Value Ref Range    Glucose 216 (H) 70 - 99 mg/dL   Sodium   Result Value Ref Range    Sodium 124 (L) 133 - 144 mmol/L   Glucose by meter   Result Value Ref Range    Glucose 176 (H) 70 - 99 mg/dL   Hemoglobin A1c   Result Value Ref Range    Hemoglobin A1C 9.4 (H) 4.3 - 6.0 %   Comprehensive metabolic panel   Result Value Ref Range    Sodium 124 (L) 133 - 144 mmol/L    Potassium 4.9 3.4 - 5.3 mmol/L    Chloride 92 (L) 94 - 109 mmol/L    Carbon Dioxide 23 20 - 32 mmol/L    Anion Gap 9 3 - 14 mmol/L    Glucose 211 (H) 70 - 99 mg/dL    Urea Nitrogen 35 (H) 7 - 30 mg/dL    Creatinine 0.84 0.66 - 1.25 mg/dL    GFR Estimate >90  Non  GFR Calc   >60 mL/min/1.7m2    GFR Estimate If Black >90   GFR Calc   >60 mL/min/1.7m2    Calcium 8.5 8.5 - 10.1 mg/dL    Bilirubin Total 25.4 (HH) 0.2 - 1.3 mg/dL    Albumin 2.8 (L) 3.4 - 5.0 g/dL    Protein Total 5.2 (L) 6.8 - 8.8 g/dL    Alkaline Phosphatase 221 (H) 40 - 150 U/L    ALT 83 (H) 0 - 70 U/L     (H) 0 - 45 U/L   CBC with platelets   Result Value Ref Range    WBC 6.4 4.0 - 11.0 10e9/L    RBC Count 3.31 (L) 4.4 - 5.9 10e12/L    Hemoglobin 11.8 (L) 13.3 - 17.7 g/dL    Hematocrit 32.8 (L) 40.0 - 53.0 %    MCV 99 78 - 100 fl    MCH 35.6 (H) 26.5 - 33.0 pg    MCHC 36.0 31.5 - 36.5 g/dL    RDW 14.9 10.0 - 15.0 %    Platelet Count 42 (LL) 150 - 450 10e9/L   Bilirubin direct   Result Value Ref Range    Bilirubin Direct 19.9 (H) 0.0 - 0.2 mg/dL   Glucose by meter    Result Value Ref Range    Glucose 184 (H) 70 - 99 mg/dL

## 2017-02-16 NOTE — PLAN OF CARE
Problem: Goal Outcome Summary  Goal: Goal Outcome Summary  Pt AO, up SBA. On oxycodone for arthritic pain. Lung sounds diminished, sats 94% RA. On fluid restriction 1200cc. Diagnostic paracentesis done today, dressing on R abdominal area CDI, no signs of bleeding. Off insulin drip, BG in the 200's - low 300's. Na 121, K 5.2, staff did not that pt's wife was buying more water bottles saying 1200 cc is not enough. Writer re-educated pt about the restriction. Plan of discharge 1-2 days.

## 2017-02-16 NOTE — PHARMACY - DISCHARGE MEDICATION RECONCILIATION
Discharge medication review for this patient is complete.   Patient was not counseled due to pt discharging before education could be completed. MD was notified that BG was high at discharge (post lunch) and that a change in insulin regimen was recommended. MD stated that the pt will have a follow-up visit with his endocrinologist on Monday to address his insulin. Per MD, no change was indicated at this time for the pt's meds after discussion.   See EPIC for allergy information, prior to admission medications and immunization status.   Pharmacist assisted with medication reconciliation of discharge medications with PTA medications.    MD was contacted with any questions/concerns:MD contacted as above for insulin review, it was determined that no changes were necessary at this time.    Additional medication history information:None    Discharge Medication List     Review of your medicines      CONTINUE these medicines which may have CHANGED, or have new prescriptions. If we are uncertain of the size of tablets/capsules you have at home, strength may be listed as something that might have changed.       Dose / Directions    insulin glargine 100 UNIT/ML injection   Commonly known as:  LANTUS   This may have changed:  when to take this   Used for:  Type 2 diabetes mellitus with other specified complication, with long-term current use of insulin (H)        Dose:  65 Units   Inject 65 Units Subcutaneous every morning   Quantity:  19.5 mL   Refills:  0       insulin lispro 100 UNIT/ML injection   Commonly known as:  humaLOG   This may have changed:    - how much to take  - how to take this  - when to take this  - additional instructions   Used for:  Type 2 diabetes mellitus with other specified complication, with long-term current use of insulin (H)        Dose:  20 Units   Inject 20 Units Subcutaneous 3 times daily (before meals)   Quantity:  1 vial   Refills:  0         CONTINUE these medicines which have NOT CHANGED        Dose / Directions    cyclobenzaprine 10 MG tablet   Commonly known as:  FLEXERIL   Used for:  Bladder spasms        Dose:  10 mg   Take 1 tablet (10 mg) by mouth 3 times daily as needed for muscle spasms   Quantity:  30 tablet   Refills:  0       furosemide 40 MG tablet   Commonly known as:  LASIX   Used for:  Cirrhosis of liver with ascites, unspecified hepatic cirrhosis type (H), Essential hypertension, Hepatic cirrhosis, unspecified hepatic cirrhosis type (H)        Dose:  40 mg   Take 1 tablet (40 mg) by mouth daily   Quantity:  30 tablet   Refills:  0       gabapentin 300 MG capsule   Commonly known as:  NEURONTIN   Used for:  Type 2 diabetes mellitus with diabetic polyneuropathy, unspecified long term insulin use status (H)        Dose:  600 mg   Take 2 capsules (600 mg) by mouth At Bedtime   Quantity:  60 capsule   Refills:  0       glucagon 1 MG Solr injection   Used for:  Hypoglycemia        Dose:  1 mg   Inject 1 mg Subcutaneous every 15 minutes as needed for low blood sugar (May repeat x 1 only)   Quantity:  1 each   Refills:  0       lactobacillus rhamnosus (GG) capsule   Used for:  Imbalanced nutrition        Dose:  1 capsule   Take 1 capsule by mouth 2 times daily   Quantity:  60 capsule   Refills:  0       omeprazole 20 MG CR capsule   Commonly known as:  priLOSEC   Used for:  Gastroesophageal reflux disease without esophagitis        Dose:  20 mg   Take 1 capsule (20 mg) by mouth 2 times daily (before meals)   Quantity:  60 capsule   Refills:  0       ondansetron 4 MG ODT tab   Commonly known as:  ZOFRAN-ODT   Used for:  Cirrhosis of liver with ascites, unspecified hepatic cirrhosis type (H), Nausea        Dose:  4 mg   Take 1 tablet (4 mg) by mouth every 8 hours as needed for nausea   Quantity:  30 tablet   Refills:  0       rifaximin 550 MG Tabs tablet   Commonly known as:  XIFAXAN   Indication:  hepatic encephalopathy   Used for:  Hepatic encephalopathy (H)        Dose:  550 mg   Take 1 tablet  (550 mg) by mouth 2 times daily   Quantity:  60 tablet   Refills:  11       spironolactone 50 MG tablet   Commonly known as:  ALDACTONE   Used for:  Hepatic cirrhosis, unspecified hepatic cirrhosis type (H)        Dose:  50 mg   Take 1 tablet (50 mg) by mouth daily   Quantity:  90 tablet   Refills:  3         STOP taking          lisinopril 40 MG tablet   Commonly known as:  PRINIVIL/ZESTRIL                Where to get your medicines      Some of these will need a paper prescription and others can be bought over the counter. Ask your nurse if you have questions.     Bring a paper prescription for each of these medications      furosemide 40 MG tablet     insulin glargine 100 UNIT/ML injection     insulin lispro 100 UNIT/ML injection

## 2017-02-16 NOTE — PROGRESS NOTES
GASTROENTEROLOGY PROGRESS NOTE     SUBJECTIVE:  No complaints. Tolerating diet. Afebrile.      OBJECTIVE:  /43 (BP Location: Right arm)  Pulse 76  Temp 96.7  F (35.9  C) (Axillary)  Resp 20  Ht 1.829 m (6')  Wt 103.2 kg (227 lb 9.6 oz)  SpO2 99%  BMI 30.87 kg/m2  Temp (24hrs), Av.4  F (36.3  C), Min:96.1  F (35.6  C), Max:98.3  F (36.8  C)    Patient Vitals for the past 72 hrs:   Weight   17 2101 103.2 kg (227 lb 9.6 oz)   17 1540 102.5 kg (226 lb)       Intake/Output Summary (Last 24 hours) at 17 1110  Last data filed at 17 1000   Gross per 24 hour   Intake             1226 ml   Output             1300 ml   Net              -74 ml        PHYSICAL EXAM  Gen: alert, oriented, NAD  Abd: soft, moderately distended, nontender  Ext: no edema.        Additional Comments:  ROS, FH, SH: See initial GI consult for details.     I have reviewed the patient's new clinical lab results:     Recent Labs   Lab Test  17   0725  17   1746  17   1621  17   1615  17   1219  17   1420  17   1350   WBC  6.4   --    --   6.4   --    --   3.8*   HGB  11.8*  11.2*  11.9*  11.4*   --    --   10.9*   MCV  99   --    --   100   --    --   98   PLT  42*   --    --   50*   --    --   35*   INR   --    --    --   1.61*  1.55*  1.52*  1.68*     Recent Labs   Lab Test  17   0725  02/15/17   1620  02/15/17   1106  02/15/17   0620   POTASSIUM  4.9  5.2  5.5*  5.2   CHLORIDE  92*   --   87*  91*   CO2  23   --   23  24   BUN  35*   --   31*  31*   ANIONGAP  9   --   10  8     Recent Labs   Lab Test  17   1005  17   0725  02/15/17   1620  17   1615  17   1219   17   1350   16   0150   16   1830  16   1159   09/30/10   1734   ALBUMIN   --   2.8*  3.0*  3.6  3.0*   < >  3.5   < >   --    < >   --   2.7*   < >  4.1   BILITOTAL   --   25.4*   --   29.2*  30.7*   < >  12.8*   < >   --    < >   --   7.6*   < >  1.0   ALT   --    83*   --   85*   --    --   63   < >   --    < >   --   52   < >  128*   AST   --   152*   --   126*   --    --   79*   < >   --    < >   --   73*   < >  70*   PROTEIN  Negative   --    --    --    --    --    --    --   30*   --   30*   --    < >   --    LIPASE   --    --    --    --    --    --    --    --    --    --    --   161   --   138   AMYLASE   --    --    --    --    --    --    --    --    --    --    --    --    --   82    < > = values in this interval not displayed.     Assessment:  Patient with [place]STA[place]EDA liver cirrhosis and HCC followed by [facility], has been listed for liver transplant but currently inactive pending cardiology work up scheduled in a few weeks. Presents to [facility] after being called by [facility] to go to nearest ER for hyperkalemia. Had labs checked yesterday as part of scheduled large volume paracentesis.     1) Elevated LFTs. No clear etiology. This has been evaluated through Dr. Camejo at our office. Paracentesis negative for SBP. No signs of infection. Urine ethyl glucorinide negative. No fevers. WBC normal. Potassium normal.    Plan:  --2g Na diet as tolerated.   --Follow up with [facility] transplant team.  --Ok from GI standpoint to d/c today.    Brissa Johnson PA-C  [facility]

## 2017-02-16 NOTE — PLAN OF CARE
Problem: Goal Outcome Summary  Goal: Goal Outcome Summary  Outcome: No Change  VS stable. 97% on CPAP. Diminished LS. No complaints of pain. Tele is sinus rhythm. Slept well throughout the night.

## 2017-02-16 NOTE — PROGRESS NOTES
OBSERVATION patient END time: 1330  Discharge instructions and medications reviewed w/pt. RX filled & provided to pt w/side effects reviewed. Pt dc to home w/c volunteer assist w/Mother as .

## 2017-02-17 ENCOUNTER — RADIANT APPOINTMENT (OUTPATIENT)
Dept: ULTRASOUND IMAGING | Facility: CLINIC | Age: 52
End: 2017-02-17
Attending: NURSE PRACTITIONER
Payer: COMMERCIAL

## 2017-02-17 ENCOUNTER — OFFICE VISIT (OUTPATIENT)
Dept: INFUSION THERAPY | Facility: CLINIC | Age: 52
End: 2017-02-17
Attending: INTERNAL MEDICINE
Payer: COMMERCIAL

## 2017-02-17 VITALS
SYSTOLIC BLOOD PRESSURE: 136 MMHG | OXYGEN SATURATION: 100 % | WEIGHT: 234.7 LBS | DIASTOLIC BLOOD PRESSURE: 62 MMHG | TEMPERATURE: 96.4 F | HEART RATE: 80 BPM | BODY MASS INDEX: 31.83 KG/M2

## 2017-02-17 DIAGNOSIS — K74.60 HEPATIC CIRRHOSIS, UNSPECIFIED HEPATIC CIRRHOSIS TYPE (H): Primary | ICD-10-CM

## 2017-02-17 LAB — ETHYL GLUCURONIDE UR QL: NORMAL

## 2017-02-17 PROCEDURE — 27210190 US PARACENTESIS

## 2017-02-17 PROCEDURE — P9047 ALBUMIN (HUMAN), 25%, 50ML: HCPCS | Mod: ZF | Performed by: NURSE PRACTITIONER

## 2017-02-17 PROCEDURE — 27210995 ZZH RX 272: Mod: ZF | Performed by: NURSE PRACTITIONER

## 2017-02-17 PROCEDURE — 40000556 ZZH STATISTIC PERIPHERAL IV START W US GUIDANCE: Mod: ZF

## 2017-02-17 PROCEDURE — 25000128 H RX IP 250 OP 636: Mod: ZF | Performed by: NURSE PRACTITIONER

## 2017-02-17 RX ORDER — ALBUMIN (HUMAN) 12.5 G/50ML
12.5 SOLUTION INTRAVENOUS 4 TIMES DAILY PRN
Status: CANCELLED
Start: 2017-02-17

## 2017-02-17 RX ORDER — ALBUMIN (HUMAN) 12.5 G/50ML
12.5 SOLUTION INTRAVENOUS 4 TIMES DAILY PRN
Status: DISCONTINUED | OUTPATIENT
Start: 2017-02-17 | End: 2017-02-17 | Stop reason: HOSPADM

## 2017-02-17 RX ADMIN — LIDOCAINE HYDROCHLORIDE 20 ML: 10 INJECTION, SOLUTION INFILTRATION; PERINEURAL at 12:55

## 2017-02-17 RX ADMIN — ALBUMIN HUMAN 12.5 G: 0.25 SOLUTION INTRAVENOUS at 13:06

## 2017-02-17 RX ADMIN — ALBUMIN HUMAN 12.5 G: 0.25 SOLUTION INTRAVENOUS at 13:29

## 2017-02-17 RX ADMIN — ALBUMIN HUMAN 12.5 G: 0.25 SOLUTION INTRAVENOUS at 13:19

## 2017-02-17 RX ADMIN — ALBUMIN HUMAN 12.5 G: 0.25 SOLUTION INTRAVENOUS at 13:11

## 2017-02-17 NOTE — PROGRESS NOTES
Paracentesis Nursing Note  Camacho Bhagat presents today to [facility] for a [facility]acentesis.    During today's appointment orders from Abigail Robison were completed.    Progress Note:  Patient identification verified by name and date of birth.  Assessment completed.  Vitals monitored throughout appointment and recorded in Doc Flowsheets.  See proceduralist note in ultrasound.    Date of consent or authorization: 2/3/17.  Invasive Procedure Safety Checklist was completed and sent for scanning.     Paracentesis performed by Mitch Dinh PA-C Radiology.    The following labs were communicated to provider performing paracentesis:  Lab Results   Component Value Date    PLT 42 02/16/2017       Total amount of ascites fluid drained: 5.1 liters.  Color of ascites fluid: yellow.  Total amount of albumin given: 50  grams.    Patient tolerated procedure well.    Post procedure,denies pain or discomfort post paracentesis.      Discharge Plan:  Discharge instructions were reviewed with patient.  Patient/Representative verbalized understanding and all questions were answered.   Discharged from [facility] in stable condition.    Ninfa Hope RN    Administrations This Visit     albumin human 25 % injection 12.5 g     Admin Date Action Dose Route Administered By             02/17/2017 New Bag 12.5 g Intravenous Ninfa Hope RN                    lidocaine BUFFERED 1 % injection 20 mL     Admin Date Action Dose Route Administered By             02/17/2017 Given 20 mL Injection Ninfa Hope RN                          Temp (P) 96.4  F (35.8  C) (Tympanic)  Wt (P) 106.5 kg (234 lb 11.2 oz)  SpO2 (P) 100%  BMI (P) 31.83 kg/m2

## 2017-02-18 DIAGNOSIS — E11.42 TYPE 2 DIABETES MELLITUS WITH DIABETIC POLYNEUROPATHY, UNSPECIFIED LONG TERM INSULIN USE STATUS: ICD-10-CM

## 2017-02-18 DIAGNOSIS — K21.9 GASTROESOPHAGEAL REFLUX DISEASE WITHOUT ESOPHAGITIS: ICD-10-CM

## 2017-02-20 RX ORDER — GABAPENTIN 300 MG/1
CAPSULE ORAL
Qty: 60 CAPSULE | Refills: 0 | OUTPATIENT
Start: 2017-02-20

## 2017-02-21 ENCOUNTER — APPOINTMENT (OUTPATIENT)
Dept: CT IMAGING | Facility: CLINIC | Age: 52
DRG: 005 | End: 2017-02-21
Attending: FAMILY MEDICINE
Payer: COMMERCIAL

## 2017-02-21 ENCOUNTER — APPOINTMENT (OUTPATIENT)
Dept: GENERAL RADIOLOGY | Facility: CLINIC | Age: 52
DRG: 005 | End: 2017-02-21
Attending: FAMILY MEDICINE
Payer: COMMERCIAL

## 2017-02-21 ENCOUNTER — OFFICE VISIT (OUTPATIENT)
Dept: INFUSION THERAPY | Facility: CLINIC | Age: 52
DRG: 005 | End: 2017-02-21
Attending: INTERNAL MEDICINE
Payer: COMMERCIAL

## 2017-02-21 ENCOUNTER — RADIANT APPOINTMENT (OUTPATIENT)
Dept: ULTRASOUND IMAGING | Facility: CLINIC | Age: 52
DRG: 005 | End: 2017-02-21
Attending: NURSE PRACTITIONER
Payer: COMMERCIAL

## 2017-02-21 ENCOUNTER — HOSPITAL ENCOUNTER (INPATIENT)
Facility: CLINIC | Age: 52
LOS: 17 days | Discharge: HOME | DRG: 005 | End: 2017-03-10
Attending: FAMILY MEDICINE | Admitting: INTERNAL MEDICINE
Payer: COMMERCIAL

## 2017-02-21 VITALS
WEIGHT: 236.8 LBS | BODY MASS INDEX: 32.12 KG/M2 | SYSTOLIC BLOOD PRESSURE: 147 MMHG | HEART RATE: 67 BPM | DIASTOLIC BLOOD PRESSURE: 59 MMHG

## 2017-02-21 DIAGNOSIS — E87.1 HYPONATREMIA: ICD-10-CM

## 2017-02-21 DIAGNOSIS — K74.60 CIRRHOSIS OF LIVER WITH ASCITES, UNSPECIFIED HEPATIC CIRRHOSIS TYPE (H): ICD-10-CM

## 2017-02-21 DIAGNOSIS — E72.20 HYPERAMMONEMIA (H): ICD-10-CM

## 2017-02-21 DIAGNOSIS — R11.0 NAUSEA: ICD-10-CM

## 2017-02-21 DIAGNOSIS — R41.82 ALTERED MENTAL STATUS, UNSPECIFIED ALTERED MENTAL STATUS TYPE: ICD-10-CM

## 2017-02-21 DIAGNOSIS — C22.0 HEPATOCELLULAR CARCINOMA (H): ICD-10-CM

## 2017-02-21 DIAGNOSIS — E87.5 HYPERKALEMIA: ICD-10-CM

## 2017-02-21 DIAGNOSIS — E11.42 TYPE 2 DIABETES MELLITUS WITH DIABETIC POLYNEUROPATHY, UNSPECIFIED LONG TERM INSULIN USE STATUS: ICD-10-CM

## 2017-02-21 DIAGNOSIS — K74.5 BILIARY CIRRHOSIS (H): ICD-10-CM

## 2017-02-21 DIAGNOSIS — N32.89 BLADDER SPASMS: ICD-10-CM

## 2017-02-21 DIAGNOSIS — K21.9 GASTROESOPHAGEAL REFLUX DISEASE WITHOUT ESOPHAGITIS: ICD-10-CM

## 2017-02-21 DIAGNOSIS — R18.8 CIRRHOSIS OF LIVER WITH ASCITES, UNSPECIFIED HEPATIC CIRRHOSIS TYPE (H): ICD-10-CM

## 2017-02-21 DIAGNOSIS — N17.9 ACUTE RENAL FAILURE, UNSPECIFIED ACUTE RENAL FAILURE TYPE (H): ICD-10-CM

## 2017-02-21 DIAGNOSIS — K76.82 HEPATIC ENCEPHALOPATHY (H): Primary | ICD-10-CM

## 2017-02-21 DIAGNOSIS — K74.60 HEPATIC CIRRHOSIS, UNSPECIFIED HEPATIC CIRRHOSIS TYPE (H): Primary | ICD-10-CM

## 2017-02-21 DIAGNOSIS — Z94.4 LIVER TRANSPLANT RECIPIENT (H): ICD-10-CM

## 2017-02-21 LAB
ALBUMIN SERPL-MCNC: 2.9 G/DL (ref 3.4–5)
ALBUMIN SERPL-MCNC: 3.2 G/DL (ref 3.4–5)
ALP SERPL-CCNC: 270 U/L (ref 40–150)
ALP SERPL-CCNC: 328 U/L (ref 40–150)
ALT SERPL W P-5'-P-CCNC: 100 U/L (ref 0–70)
ALT SERPL W P-5'-P-CCNC: 87 U/L (ref 0–70)
AMMONIA PLAS-SCNC: 190 UMOL/L (ref 10–50)
ANION GAP SERPL CALCULATED.3IONS-SCNC: 11 MMOL/L (ref 3–14)
ANION GAP SERPL CALCULATED.3IONS-SCNC: 12 MMOL/L (ref 3–14)
ANION GAP SERPL CALCULATED.3IONS-SCNC: 13 MMOL/L (ref 3–14)
APTT PPP: 35 SEC (ref 22–37)
AST SERPL W P-5'-P-CCNC: 120 U/L (ref 0–45)
AST SERPL W P-5'-P-CCNC: 143 U/L (ref 0–45)
BASOPHILS # BLD AUTO: 0 10E9/L (ref 0–0.2)
BASOPHILS # BLD AUTO: 0 10E9/L (ref 0–0.2)
BASOPHILS NFR BLD AUTO: 0.2 %
BASOPHILS NFR BLD AUTO: 0.3 %
BILIRUB DIRECT SERPL-MCNC: 25.6 MG/DL (ref 0–0.2)
BILIRUB SERPL-MCNC: 31.8 MG/DL (ref 0.2–1.3)
BILIRUB SERPL-MCNC: 34.2 MG/DL (ref 0.2–1.3)
BUN SERPL-MCNC: 44 MG/DL (ref 7–30)
BUN SERPL-MCNC: 46 MG/DL (ref 7–30)
BUN SERPL-MCNC: 50 MG/DL (ref 7–30)
CALCIUM SERPL-MCNC: 8.4 MG/DL (ref 8.5–10.1)
CALCIUM SERPL-MCNC: 8.6 MG/DL (ref 8.5–10.1)
CALCIUM SERPL-MCNC: 8.6 MG/DL (ref 8.5–10.1)
CHLORIDE SERPL-SCNC: 90 MMOL/L (ref 94–109)
CHLORIDE SERPL-SCNC: 90 MMOL/L (ref 94–109)
CHLORIDE SERPL-SCNC: 94 MMOL/L (ref 94–109)
CO2 SERPL-SCNC: 21 MMOL/L (ref 20–32)
CREAT SERPL-MCNC: 1.32 MG/DL (ref 0.66–1.25)
CREAT SERPL-MCNC: 1.34 MG/DL (ref 0.66–1.25)
CREAT SERPL-MCNC: 1.46 MG/DL (ref 0.66–1.25)
CREAT SERPL-MCNC: 1.64 MG/DL (ref 0.66–1.25)
DIFFERENTIAL METHOD BLD: ABNORMAL
DIFFERENTIAL METHOD BLD: ABNORMAL
EOSINOPHIL # BLD AUTO: 0.1 10E9/L (ref 0–0.7)
EOSINOPHIL # BLD AUTO: 0.2 10E9/L (ref 0–0.7)
EOSINOPHIL NFR BLD AUTO: 2 %
EOSINOPHIL NFR BLD AUTO: 2.1 %
ERYTHROCYTE [DISTWIDTH] IN BLOOD BY AUTOMATED COUNT: 14.8 % (ref 10–15)
ERYTHROCYTE [DISTWIDTH] IN BLOOD BY AUTOMATED COUNT: 15.6 % (ref 10–15)
ERYTHROCYTE [DISTWIDTH] IN BLOOD BY AUTOMATED COUNT: 15.8 % (ref 10–15)
GFR SERPL CREATININE-BSD FRML MDRD: 44 ML/MIN/1.7M2
GFR SERPL CREATININE-BSD FRML MDRD: 51 ML/MIN/1.7M2
GFR SERPL CREATININE-BSD FRML MDRD: 56 ML/MIN/1.7M2
GFR SERPL CREATININE-BSD FRML MDRD: 57 ML/MIN/1.7M2
GLUCOSE BLDC GLUCOMTR-MCNC: 204 MG/DL (ref 70–99)
GLUCOSE BLDC GLUCOMTR-MCNC: 252 MG/DL (ref 70–99)
GLUCOSE BLDC GLUCOMTR-MCNC: 275 MG/DL (ref 70–99)
GLUCOSE BLDC GLUCOMTR-MCNC: 278 MG/DL (ref 70–99)
GLUCOSE BLDC GLUCOMTR-MCNC: 387 MG/DL (ref 70–99)
GLUCOSE BLDC GLUCOMTR-MCNC: 410 MG/DL (ref 70–99)
GLUCOSE SERPL-MCNC: 384 MG/DL (ref 70–99)
GLUCOSE SERPL-MCNC: 400 MG/DL (ref 70–99)
GLUCOSE SERPL-MCNC: 410 MG/DL (ref 70–99)
HCT VFR BLD AUTO: 29.9 % (ref 40–53)
HCT VFR BLD AUTO: 30.1 % (ref 40–53)
HCT VFR BLD AUTO: 32.1 % (ref 40–53)
HGB BLD-MCNC: 10.4 G/DL (ref 13.3–17.7)
HGB BLD-MCNC: 10.9 G/DL (ref 13.3–17.7)
HGB BLD-MCNC: 11.5 G/DL (ref 13.3–17.7)
IMM GRANULOCYTES # BLD: 0 10E9/L (ref 0–0.4)
IMM GRANULOCYTES # BLD: 0.1 10E9/L (ref 0–0.4)
IMM GRANULOCYTES NFR BLD: 0.3 %
IMM GRANULOCYTES NFR BLD: 0.8 %
INR PPP: 1.5 (ref 0.86–1.14)
INR PPP: 1.64 (ref 0.86–1.14)
LACTATE BLD-SCNC: 2.5 MMOL/L (ref 0.7–2.1)
LIPASE SERPL-CCNC: 326 U/L (ref 73–393)
LYMPHOCYTES # BLD AUTO: 0.3 10E9/L (ref 0.8–5.3)
LYMPHOCYTES # BLD AUTO: 0.4 10E9/L (ref 0.8–5.3)
LYMPHOCYTES NFR BLD AUTO: 2.8 %
LYMPHOCYTES NFR BLD AUTO: 6.7 %
MAGNESIUM SERPL-MCNC: 2.6 MG/DL (ref 1.6–2.3)
MCH RBC QN AUTO: 33.8 PG (ref 26.5–33)
MCH RBC QN AUTO: 34.5 PG (ref 26.5–33)
MCH RBC QN AUTO: 35 PG (ref 26.5–33)
MCHC RBC AUTO-ENTMCNC: 34.8 G/DL (ref 31.5–36.5)
MCHC RBC AUTO-ENTMCNC: 35.8 G/DL (ref 31.5–36.5)
MCHC RBC AUTO-ENTMCNC: 36.2 G/DL (ref 31.5–36.5)
MCV RBC AUTO: 96 FL (ref 78–100)
MCV RBC AUTO: 97 FL (ref 78–100)
MCV RBC AUTO: 97 FL (ref 78–100)
MONOCYTES # BLD AUTO: 0.2 10E9/L (ref 0–1.3)
MONOCYTES # BLD AUTO: 0.6 10E9/L (ref 0–1.3)
MONOCYTES NFR BLD AUTO: 2.6 %
MONOCYTES NFR BLD AUTO: 6 %
NEUTROPHILS # BLD AUTO: 5.5 10E9/L (ref 1.6–8.3)
NEUTROPHILS # BLD AUTO: 8.6 10E9/L (ref 1.6–8.3)
NEUTROPHILS NFR BLD AUTO: 88.1 %
NEUTROPHILS NFR BLD AUTO: 88.1 %
NRBC # BLD AUTO: 0 10*3/UL
NRBC # BLD AUTO: 0 10*3/UL
NRBC BLD AUTO-RTO: 0 /100
NRBC BLD AUTO-RTO: 0 /100
PLATELET # BLD AUTO: 36 10E9/L (ref 150–450)
PLATELET # BLD AUTO: 38 10E9/L (ref 150–450)
PLATELET # BLD AUTO: 55 10E9/L (ref 150–450)
PLATELET # BLD EST: ABNORMAL 10*3/UL
POTASSIUM SERPL-SCNC: 5.9 MMOL/L (ref 3.4–5.3)
POTASSIUM SERPL-SCNC: 6 MMOL/L (ref 3.4–5.3)
POTASSIUM SERPL-SCNC: 6.2 MMOL/L (ref 3.4–5.3)
PROCALCITONIN SERPL-MCNC: 1.65 NG/ML
PROCALCITONIN SERPL-MCNC: 1.79 NG/ML
PROT SERPL-MCNC: 5.2 G/DL (ref 6.8–8.8)
PROT SERPL-MCNC: 5.7 G/DL (ref 6.8–8.8)
RBC # BLD AUTO: 3.08 10E12/L (ref 4.4–5.9)
RBC # BLD AUTO: 3.11 10E12/L (ref 4.4–5.9)
RBC # BLD AUTO: 3.33 10E12/L (ref 4.4–5.9)
SODIUM SERPL-SCNC: 123 MMOL/L (ref 133–144)
SODIUM SERPL-SCNC: 124 MMOL/L (ref 133–144)
SODIUM SERPL-SCNC: 124 MMOL/L (ref 133–144)
SODIUM SERPL-SCNC: 126 MMOL/L (ref 133–144)
WBC # BLD AUTO: 6.2 10E9/L (ref 4–11)
WBC # BLD AUTO: 6.3 10E9/L (ref 4–11)
WBC # BLD AUTO: 9.8 10E9/L (ref 4–11)

## 2017-02-21 PROCEDURE — 85025 COMPLETE CBC W/AUTO DIFF WBC: CPT | Performed by: FAMILY MEDICINE

## 2017-02-21 PROCEDURE — 85730 THROMBOPLASTIN TIME PARTIAL: CPT | Performed by: FAMILY MEDICINE

## 2017-02-21 PROCEDURE — 96375 TX/PRO/DX INJ NEW DRUG ADDON: CPT | Performed by: FAMILY MEDICINE

## 2017-02-21 PROCEDURE — 93005 ELECTROCARDIOGRAM TRACING: CPT | Performed by: FAMILY MEDICINE

## 2017-02-21 PROCEDURE — 80053 COMPREHEN METABOLIC PANEL: CPT | Performed by: FAMILY MEDICINE

## 2017-02-21 PROCEDURE — 25000132 ZZH RX MED GY IP 250 OP 250 PS 637: Performed by: INTERNAL MEDICINE

## 2017-02-21 PROCEDURE — 25000132 ZZH RX MED GY IP 250 OP 250 PS 637: Performed by: STUDENT IN AN ORGANIZED HEALTH CARE EDUCATION/TRAINING PROGRAM

## 2017-02-21 PROCEDURE — 80048 BASIC METABOLIC PNL TOTAL CA: CPT | Performed by: FAMILY MEDICINE

## 2017-02-21 PROCEDURE — 99285 EMERGENCY DEPT VISIT HI MDM: CPT | Performed by: FAMILY MEDICINE

## 2017-02-21 PROCEDURE — 25000128 H RX IP 250 OP 636: Performed by: STUDENT IN AN ORGANIZED HEALTH CARE EDUCATION/TRAINING PROGRAM

## 2017-02-21 PROCEDURE — 00000146 ZZHCL STATISTIC GLUCOSE BY METER IP

## 2017-02-21 PROCEDURE — 25000128 H RX IP 250 OP 636: Performed by: FAMILY MEDICINE

## 2017-02-21 PROCEDURE — 85610 PROTHROMBIN TIME: CPT | Performed by: FAMILY MEDICINE

## 2017-02-21 PROCEDURE — 12000006 ZZH R&B IMCU INTERMEDIATE UMMC

## 2017-02-21 PROCEDURE — 25000132 ZZH RX MED GY IP 250 OP 250 PS 637: Performed by: FAMILY MEDICINE

## 2017-02-21 PROCEDURE — 25000125 ZZHC RX 250: Performed by: INTERNAL MEDICINE

## 2017-02-21 PROCEDURE — 25000128 H RX IP 250 OP 636: Performed by: INTERNAL MEDICINE

## 2017-02-21 PROCEDURE — 83605 ASSAY OF LACTIC ACID: CPT | Performed by: INTERNAL MEDICINE

## 2017-02-21 PROCEDURE — 82140 ASSAY OF AMMONIA: CPT | Performed by: FAMILY MEDICINE

## 2017-02-21 PROCEDURE — 99285 EMERGENCY DEPT VISIT HI MDM: CPT | Mod: 25 | Performed by: FAMILY MEDICINE

## 2017-02-21 PROCEDURE — 87040 BLOOD CULTURE FOR BACTERIA: CPT | Performed by: INTERNAL MEDICINE

## 2017-02-21 PROCEDURE — 83690 ASSAY OF LIPASE: CPT | Performed by: FAMILY MEDICINE

## 2017-02-21 PROCEDURE — 93010 ELECTROCARDIOGRAM REPORT: CPT | Mod: Z6 | Performed by: FAMILY MEDICINE

## 2017-02-21 PROCEDURE — 85027 COMPLETE CBC AUTOMATED: CPT | Performed by: FAMILY MEDICINE

## 2017-02-21 PROCEDURE — P9047 ALBUMIN (HUMAN), 25%, 50ML: HCPCS | Performed by: INTERNAL MEDICINE

## 2017-02-21 PROCEDURE — 84145 PROCALCITONIN (PCT): CPT | Performed by: FAMILY MEDICINE

## 2017-02-21 PROCEDURE — 96365 THER/PROPH/DIAG IV INF INIT: CPT | Performed by: FAMILY MEDICINE

## 2017-02-21 PROCEDURE — 87040 BLOOD CULTURE FOR BACTERIA: CPT | Performed by: FAMILY MEDICINE

## 2017-02-21 PROCEDURE — 40000141 ZZH STATISTIC PERIPHERAL IV START W/O US GUIDANCE

## 2017-02-21 PROCEDURE — 84145 PROCALCITONIN (PCT): CPT | Performed by: INTERNAL MEDICINE

## 2017-02-21 PROCEDURE — 99223 1ST HOSP IP/OBS HIGH 75: CPT | Mod: AI | Performed by: INTERNAL MEDICINE

## 2017-02-21 PROCEDURE — 83735 ASSAY OF MAGNESIUM: CPT | Performed by: FAMILY MEDICINE

## 2017-02-21 PROCEDURE — 71010 XR CHEST PORT 1 VW: CPT

## 2017-02-21 PROCEDURE — 70450 CT HEAD/BRAIN W/O DYE: CPT

## 2017-02-21 RX ORDER — DEXTROSE MONOHYDRATE 25 G/50ML
25 INJECTION, SOLUTION INTRAVENOUS ONCE
Status: DISCONTINUED | OUTPATIENT
Start: 2017-02-21 | End: 2017-02-22

## 2017-02-21 RX ORDER — LACTULOSE 10 G/15ML
100 SOLUTION ORAL
Status: DISCONTINUED | OUTPATIENT
Start: 2017-02-21 | End: 2017-03-04

## 2017-02-21 RX ORDER — LACTULOSE 10 G/15ML
20 SOLUTION ORAL ONCE
Status: DISCONTINUED | OUTPATIENT
Start: 2017-02-21 | End: 2017-02-22

## 2017-02-21 RX ORDER — DEXTROSE MONOHYDRATE 25 G/50ML
25-50 INJECTION, SOLUTION INTRAVENOUS
Status: DISCONTINUED | OUTPATIENT
Start: 2017-02-21 | End: 2017-02-21

## 2017-02-21 RX ORDER — THIAMINE HYDROCHLORIDE 100 MG/ML
100 INJECTION, SOLUTION INTRAMUSCULAR; INTRAVENOUS DAILY
Status: DISCONTINUED | OUTPATIENT
Start: 2017-02-21 | End: 2017-02-22

## 2017-02-21 RX ORDER — ALBUMIN (HUMAN) 12.5 G/50ML
12.5 SOLUTION INTRAVENOUS 4 TIMES DAILY PRN
Status: CANCELLED
Start: 2017-02-21

## 2017-02-21 RX ORDER — DEXTROSE MONOHYDRATE 25 G/50ML
25-50 INJECTION, SOLUTION INTRAVENOUS
Status: DISCONTINUED | OUTPATIENT
Start: 2017-02-21 | End: 2017-03-10 | Stop reason: HOSPADM

## 2017-02-21 RX ORDER — LANOLIN ALCOHOL/MO/W.PET/CERES
100 CREAM (GRAM) TOPICAL DAILY
Status: DISCONTINUED | OUTPATIENT
Start: 2017-02-21 | End: 2017-02-24

## 2017-02-21 RX ORDER — DEXTROSE MONOHYDRATE 100 MG/ML
INJECTION, SOLUTION INTRAVENOUS CONTINUOUS
Status: DISCONTINUED | OUTPATIENT
Start: 2017-02-21 | End: 2017-02-22

## 2017-02-21 RX ORDER — LACTULOSE 10 G/15ML
20 SOLUTION ORAL
Status: DISCONTINUED | OUTPATIENT
Start: 2017-02-21 | End: 2017-03-04

## 2017-02-21 RX ORDER — ALBUMIN (HUMAN) 12.5 G/50ML
50 SOLUTION INTRAVENOUS ONCE
Status: COMPLETED | OUTPATIENT
Start: 2017-02-21 | End: 2017-02-21

## 2017-02-21 RX ORDER — NICOTINE POLACRILEX 4 MG
15-30 LOZENGE BUCCAL
Status: DISCONTINUED | OUTPATIENT
Start: 2017-02-21 | End: 2017-03-10 | Stop reason: HOSPADM

## 2017-02-21 RX ORDER — ALBUMIN (HUMAN) 12.5 G/50ML
12.5 SOLUTION INTRAVENOUS 4 TIMES DAILY PRN
Status: DISCONTINUED | OUTPATIENT
Start: 2017-02-21 | End: 2017-04-05 | Stop reason: HOSPADM

## 2017-02-21 RX ORDER — PIPERACILLIN SODIUM, TAZOBACTAM SODIUM 4; .5 G/20ML; G/20ML
4.5 INJECTION, POWDER, LYOPHILIZED, FOR SOLUTION INTRAVENOUS EVERY 6 HOURS
Status: DISCONTINUED | OUTPATIENT
Start: 2017-02-21 | End: 2017-02-24

## 2017-02-21 RX ORDER — NALOXONE HYDROCHLORIDE 0.4 MG/ML
.1-.4 INJECTION, SOLUTION INTRAMUSCULAR; INTRAVENOUS; SUBCUTANEOUS
Status: DISCONTINUED | OUTPATIENT
Start: 2017-02-21 | End: 2017-03-10 | Stop reason: HOSPADM

## 2017-02-21 RX ORDER — FOLIC ACID 5 MG/ML
1 INJECTION, SOLUTION INTRAMUSCULAR; INTRAVENOUS; SUBCUTANEOUS DAILY
Status: DISCONTINUED | OUTPATIENT
Start: 2017-02-21 | End: 2017-02-22

## 2017-02-21 RX ORDER — DEXTROSE MONOHYDRATE 100 MG/ML
INJECTION, SOLUTION INTRAVENOUS CONTINUOUS
Status: DISCONTINUED | OUTPATIENT
Start: 2017-02-21 | End: 2017-02-21

## 2017-02-21 RX ORDER — NICOTINE POLACRILEX 4 MG
15-30 LOZENGE BUCCAL
Status: DISCONTINUED | OUTPATIENT
Start: 2017-02-21 | End: 2017-02-21

## 2017-02-21 RX ORDER — FOLIC ACID 1 MG/1
1 TABLET ORAL DAILY
Status: DISCONTINUED | OUTPATIENT
Start: 2017-02-21 | End: 2017-02-24

## 2017-02-21 RX ADMIN — ALBUMIN HUMAN 12.5 G: 0.25 SOLUTION INTRAVENOUS at 12:54

## 2017-02-21 RX ADMIN — ALBUMIN HUMAN 12.5 G: 0.25 SOLUTION INTRAVENOUS at 12:45

## 2017-02-21 RX ADMIN — LACTULOSE 100 G: 10 SOLUTION ORAL; RECTAL at 21:52

## 2017-02-21 RX ADMIN — ALBUMIN (HUMAN) 50 G: 12.5 SOLUTION INTRAVENOUS at 21:02

## 2017-02-21 RX ADMIN — HUMAN INSULIN 10 UNITS: 100 INJECTION, SOLUTION SUBCUTANEOUS at 22:33

## 2017-02-21 RX ADMIN — HUMAN INSULIN 10 UNITS: 100 INJECTION, SOLUTION SUBCUTANEOUS at 18:40

## 2017-02-21 RX ADMIN — THIAMINE HYDROCHLORIDE 100 MG: 100 INJECTION, SOLUTION INTRAMUSCULAR; INTRAVENOUS at 21:12

## 2017-02-21 RX ADMIN — ALBUMIN HUMAN 12.5 G: 0.25 SOLUTION INTRAVENOUS at 12:38

## 2017-02-21 RX ADMIN — PIPERACILLIN AND TAZOBACTAM 4.5 G: 4; .5 INJECTION, POWDER, FOR SOLUTION INTRAVENOUS at 19:19

## 2017-02-21 RX ADMIN — LIDOCAINE HYDROCHLORIDE 20 ML: 10 INJECTION, SOLUTION INFILTRATION; PERINEURAL at 12:30

## 2017-02-21 RX ADMIN — SODIUM CHLORIDE 500 ML: 9 INJECTION, SOLUTION INTRAVENOUS at 23:51

## 2017-02-21 RX ADMIN — SODIUM BICARBONATE 50 MEQ: 84 INJECTION, SOLUTION INTRAVENOUS at 18:44

## 2017-02-21 RX ADMIN — FOLIC ACID 1 MG: 5 INJECTION, SOLUTION INTRAMUSCULAR; INTRAVENOUS; SUBCUTANEOUS at 21:11

## 2017-02-21 RX ADMIN — ALBUMIN HUMAN 12.5 G: 0.25 SOLUTION INTRAVENOUS at 13:04

## 2017-02-21 ASSESSMENT — PAIN DESCRIPTION - DESCRIPTORS: DESCRIPTORS: PATIENT UNABLE TO DESCRIBE

## 2017-02-21 NOTE — NURSING NOTE
S-(situation): elevated liver enzymes; hyperkalemia; hyperglycemia, altered mental status    B-(background): history of cirrhosis of liver    A-(assessment): pt lethargic, weak, and confused. Awakens to stimulation and answers questions    R-(recommendations): Go to emergency room for assessment and possible admission for stabilization of labs, etc.

## 2017-02-21 NOTE — IP AVS SNAPSHOT
[address] [address]    Phone:  [phone]                                       After Visit Summary   2/21/2017    Camacho Bhagat    MRN: [id number]           After Visit Summary Signature Page     I have received my discharge instructions, and my questions have been answered. I have discussed any challenges I see with this plan with the nurse or doctor.    ..........................................................................................................................................  Patient/Patient Representative Signature      ..........................................................................................................................................  Patient Representative Print Name and Relationship to Patient    ..................................................               ................................................  Date                                            Time    ..........................................................................................................................................  Reviewed by Signature/Title    ...................................................              ..............................................  Date                                                            Time

## 2017-02-21 NOTE — MR AVS SNAPSHOT
After Visit Summary   2/21/2017    Camacho Bhagat    MRN: [id number]           Patient Information     Date Of Birth          1965        Visit Information        Provider Department      2/21/2017 12:00 PM Mitch Dinh PA-C; [facility]        Today's Diagnoses     Hepatic cirrhosis, unspecified hepatic cirrhosis type (H)    -  1    Cirrhosis of liver with ascites, unspecified hepatic cirrhosis type (H)        Hepatocellular carcinoma (H)           Follow-ups after your visit        Your next 10 appointments already scheduled     Apr 17, 2017 11:30 AM CDT   (Arrive by 11:15 AM)   Liver Return Post Op with Jovan Tran MD   [facility] ([facility] [place] [facility])    [address] [place] [id number] [phone]            Apr 24, 2017  1:00 PM CDT   (Arrive by 12:45 PM)   Return General Liver with Toñito Camejo MD   [facility] Hepatology ([facility])    [address] [place] [address] [phone]              Who to contact     If you have questions or need follow up information about today's clinic visit or your schedule please contact [facility] directly at [phone].  Normal or non-critical lab and imaging results will be communicated to you by MyChart, letter or phone within 4 business days after the clinic has received the results. If you do not hear from us within 7 days, please contact the clinic through MyChart or phone. If you have a critical or abnormal lab result, we will notify you by phone as soon as possible.  Submit refill requests through [employer] or call your pharmacy and they will forward the refill request to us. Please allow 3 business days for your refill to be completed.          Additional Information About Your Visit        [facility]  Information     [employer] gives you secure access to your electronic health record. If you see a primary care provider, you can also send messages to your care team and make appointments. If you have questions, please call your primary care clinic.  If you do not have a primary care provider, please call [phone] and they will assist you.        Care EveryWhere ID     This is your Care EveryWhere ID. This could be used by other organizations to access your [facility] [facility] medical records  [id number]        Your Vitals Were     Pulse BMI (Body Mass Index)                67 32.12 kg/m2           Blood Pressure from Last 3 Encounters:   03/26/17 141/90   03/23/17 118/74   03/20/17 115/69    Weight from Last 3 Encounters:   03/26/17 107.2 kg (236 lb 5.3 oz)   03/16/17 103.3 kg (227 lb 12.8 oz)   03/13/17 105.1 kg (231 lb 9.6 oz)              We Performed the Following     Basic metabolic panel     CBC with platelets differential     Creatinine     Hepatic panel     INR     Sodium     Treatment Conditions     US Paracentesis        Primary Care Provider Office Phone # Fax #    Shay Kirkpatrick [phone]        [facility] [address] [facility] [address]  [facility] [address]        Thank you!     Thank you for choosing [facility]  for your care. Our goal is always to provide you with excellent care. Hearing back from our patients is one way we can continue to improve our services. Please take a few minutes to complete the written survey that you may receive in the mail after your visit with us. Thank you!             Your Updated Medication List - Protect others around you: Learn how to safely use, store and throw away your medicines at www.disposemymeds.org.          This list is accurate as of: 2/21/17  3:02 PM.  Always use your most recent med list.                   Brand Name Dispense Instructions for use    aspirin 325 MG tablet     180 tablet    1 tablet (325  mg) by Oral or Feeding Tube route daily       cyclobenzaprine 10 MG tablet    FLEXERIL    30 tablet    Take 1 tablet (10 mg) by mouth 3 times daily as needed for muscle spasms       furosemide 40 MG tablet    LASIX    30 tablet    Take 1 tablet (40 mg) by mouth daily       gabapentin 300 MG capsule    NEURONTIN    60 capsule    Take 2 capsules (600 mg) by mouth At Bedtime       glucagon 1 MG Solr injection     1 each    Inject 1 mg Subcutaneous every 15 minutes as needed for low blood sugar (May repeat x 1 only)       * insulin aspart 100 UNIT/ML injection    NovoLOG PEN    3 mL    DOSE:  1 units per 8 grams of carbohydrate. With meals and snacks. Only chart total amount of units given.  Do not give if pre-prandial glucose is less than 60 mg/dL.       * insulin aspart 100 UNIT/ML injection    NovoLOG PEN    3 mL    Inject 1-10 Units Subcutaneous 3 times daily (before meals) For Pre-Meal  - 189 give 1 unit.  For Pre-Meal  - 239 give 2 units.  For Pre-Meal  - 289 give 3 units.  For Pre-Meal  - 339 give 4 units.  For Pre-Meal -399 give 5 units. For Pre-Meal -449 give 6 units. For Pre-Meal BG = or > 450 give 7 units.       * insulin aspart 100 UNIT/ML injection    NovoLOG PEN    3 mL    Inject 1-7 Units Subcutaneous At Bedtime Bedtime Blood Glucose (BG) For  - 249 give 1 units.  For  - 299 give 2 units.  For  - 349 give 3 units. For - 399 give 4 units.  For BG = or > 400 give 5 units.       * insulin glargine 100 UNIT/ML injection    LANTUS    19.5 mL    Inject 65 Units Subcutaneous every morning       * insulin glargine 100 UNIT/ML injection    LANTUS    3 mL    Inject 45 Units Subcutaneous every 24 hours       insulin lispro 100 UNIT/ML injection    humaLOG    1 vial    Inject 20 Units Subcutaneous 3 times daily (before meals)       LISINOPRIL PO      Take by mouth daily       multivitamin, therapeutic with minerals Tabs tablet     30 each    Take 1 tablet by  mouth daily       * omeprazole 20 MG CR capsule    priLOSEC    60 capsule    Take 1 capsule (20 mg) by mouth 2 times daily (before meals)       * omeprazole 20 MG CR capsule    priLOSEC    60 capsule    Take 1 capsule (20 mg) by mouth 2 times daily (before meals)       * ondansetron 4 MG ODT tab    ZOFRAN-ODT    30 tablet    Take 1 tablet (4 mg) by mouth every 8 hours as needed for nausea       * ondansetron 4 MG ODT tab    ZOFRAN-ODT    30 tablet    Take 1 tablet (4 mg) by mouth every 8 hours as needed for nausea       oxyCODONE 5 MG IR tablet    ROXICODONE    40 tablet    Take 1-2 tablets (5-10 mg) by mouth every 4 hours as needed for moderate to severe pain       rifaximin 550 MG Tabs tablet    XIFAXAN    60 tablet    Take 1 tablet (550 mg) by mouth 2 times daily       senna-docusate 8.6-50 MG per tablet    SENOKOT-S;PERICOLACE    100 tablet    Take 2 tablets by mouth 2 times daily       spironolactone 50 MG tablet    ALDACTONE    90 tablet    Take 1 tablet (50 mg) by mouth daily       sulfamethoxazole-trimethoprim 400-80 MG per tablet    BACTRIM/SEPTRA    30 tablet    Take 1 tablet by mouth daily       valGANciclovir 450 MG tablet    VALCYTE    60 tablet    Take 1 tablet (450 mg) by mouth daily       * Notice:  This list has 9 medication(s) that are the same as other medications prescribed for you. Read the directions carefully, and ask your doctor or other care provider to review them with you.

## 2017-02-21 NOTE — IP AVS SNAPSHOT
MRN:[id number]                      After Visit Summary   2017    Camacho Bhagat    MRN: [id number]           Thank you!     Thank you for choosing [facility] for your care. Our goal is always to provide you with excellent care. Hearing back from our patients is one way we can continue to improve our services. Please take a few minutes to complete the written survey that you may receive in the mail after you visit with us. Thank you!        Patient Information     Date Of Birth          1965        About your hospital stay     You were admitted on:  2017 You last received care in the:  [address] [facility] [place]    You were discharged on:  March 10, 2017        Reason for your hospital stay        donor liver transplant, biliary stent placed  High output drain, due to large volume ascites  Acute renal insufficiency, HRS, IV contrast (CTA)  DM type 2. Hyperglycemia secondary to steroids                  Who to Call     For medical emergencies, please call 911.  For non-urgent questions about your medical care, please call your primary care provider or clinic, [phone]  For questions related to your surgery, please call your surgery clinic        Attending Provider     Provider Specialty    Alirio Mills MD Emergency Medicine    Momo Wade MD Internal Medicine    Bessy Casanova DO Internal Medicine    Jovan Tran MD Transplant       Primary Care Provider Office Phone # Fax #    Shay Kirkpatrick [phone]        [facility] [address]  [facility] [address]        Follow-up Appointments     Adult [facility] Follow-up and recommended labs and tests       You will be seen in [facility] (ph. [phone]) on Huy 3/12 and Monday 3/13.   Your labs will be drawn at 9:00am just prior to your appointment. DO NOT take tacrolimus (Prograf) until after your labs are drawn. Remember to bring all of your  medications with you to this appointment.      LABS:  Transplant labs (CBC, BMP, hepatic panel, mag, phos, and  tacrolimus levels) to be drawn every  Monday and Thursday for 4 weeks.   Transplant Labs to be drawn on 3/12 Huy.     FOLLOW UP APPOINTMENTS:  Remember to always bring an updated medication list to all appointments.     Follow up with your transplant surgeon, Chelsea, in 2 weeks. Appt to be scheduled on 3/20/2017  Follow up with transplant hepatology as directed by transplant surgeon.    Follow up with oncology in 1-2 months  Follow up with primary care provider in 2-4 weeks. (Pt to schedule)  Follow up with Endocrinologist in 1-2 weeks.   Your staples will be removed 3 weeks after your operation.  You have a biliary stent in place.  Your coordinator will follow up in 6-8 weeks.      WHEN TO CONTACT YOUR  COORDINATOR:  Transplant Coordinator [phone]  Notify your coordinator if you have abdominal pain, increased redness or drainage from your incision, or fever greater than 100.5F.  Notify your coordinator immediately if you are ever unable to take your immunosuppressive medications for any reason.  If it is outside of office hours, please call the hospital switchboard at [phone] and ask to have the liver transplant surgery fellow paged for urgent medical questions, or present to the emergency department.    OTHER DISCHARGE INSTRUCTIONS:  TABITHA plan: Please monitor color and amount of output. Drain will remain in place until output is < 500 cc x 3 days.   Monitor blood glucose levels 4 times a day  Walk at least four times a day, lift no greater than 10 pounds for 6-8 weeks from the time of surgery.  No driving while taking narcotics or 3 weeks after surgery.    Diet recommendations post-transplant: Heart healthy dietary habits long term (low saturated/trans fat, low sodium). High protein diet x 8 weeks. Practice food safety precautions.                  Your next 10 appointments already  scheduled     Apr 24, 2017  1:00 PM CDT   (Arrive by 12:45 PM)   Return General Liver with Toñito Camejo MD   [facility] ([facility] [place])    [address]  [address]rd [facility] [address] [phone]              Pending Results     Date and Time Order Name Status Description    3/4/2017 1744 Surgical pathology exam In process     3/4/2017 1500 Fungus Culture, non-blood Preliminary     3/4/2017 1500 Anaerobic bacterial culture Preliminary             Statement of Approval     Ordered          03/10/17 1229  I have reviewed and agree with all the recommendations and orders detailed in this document.     Approved and electronically signed by:  Ada Driver PA-C             Admission Information     Date & Time Provider Department Dept. Phone    2/21/2017 Jovan Tran MD [address] [facility] [place] [phone]      Your Vitals Were     Blood Pressure Pulse Temperature Respirations Height Weight    127/76 (BP Location: Left arm) 86 98.6  F (37  C) (Oral) 16 1.829 m (6') 98.4 kg (216 lb 14.4 oz)    Pulse Oximetry BMI (Body Mass Index)                100% 29.42 kg/m2          [employer] Information     [employer] gives you secure access to your electronic health record. If you see a primary care provider, you can also send messages to your care team and make appointments. If you have questions, please call your primary care clinic.  If you do not have a primary care provider, please call [phone] and they will assist you.        Care EveryWhere ID     This is your Care EveryWhere ID. This could be used by other organizations to access your [facility] medical records  [id number]           Review of your medicines      START taking        Dose / Directions    aspirin 325 MG tablet        Dose:  325 mg   1 tablet (325 mg) by Oral or Feeding Tube route daily   Quantity:  180 tablet   Refills:  4       clotrimazole 10 MG Lozg lozenge   Commonly known as:  MYCELEX         Dose:  1 Beatrice   Place 1 lozenge (1 Beatrice) inside cheek 3 times daily   Quantity:  70 each   Refills:  0       * insulin aspart 100 UNIT/ML injection   Commonly known as:  NovoLOG PEN        DOSE:  1 units per 8 grams of carbohydrate. With meals and snacks. Only chart total amount of units given.  Do not give if pre-prandial glucose is less than 60 mg/dL.   Quantity:  3 mL   Refills:  3       * insulin aspart 100 UNIT/ML injection   Commonly known as:  NovoLOG PEN        DOSE:  1 units per 10 grams of carbohydrate.  Only chart total amount of units given. HOLD CARB COVERAGE FOR  03/10/17 . ENDOCRINE TEAM WILL GIVE ALTERNATE ORDERS PAGER [phone]  Do not give if pre-prandial glucose is less than 60 mg/dL.   Quantity:  3 mL   Refills:  3       * insulin aspart 100 UNIT/ML injection   Commonly known as:  NovoLOG PEN        Dose:  1-10 Units   Inject 1-10 Units Subcutaneous 3 times daily (before meals) For Pre-Meal  - 189 give 1 unit.  For Pre-Meal  - 239 give 2 units.  For Pre-Meal  - 289 give 3 units.  For Pre-Meal  - 339 give 4 units.  For Pre-Meal -399 give 5 units. For Pre-Meal -449 give 6 units. For Pre-Meal BG = or > 450 give 7 units.   Quantity:  3 mL   Refills:  3       * insulin aspart 100 UNIT/ML injection   Commonly known as:  NovoLOG PEN        Dose:  1-7 Units   Inject 1-7 Units Subcutaneous At Bedtime Bedtime Blood Glucose (BG) For  - 249 give 1 units.  For  - 299 give 2 units.  For  - 349 give 3 units. For - 399 give 4 units.  For BG = or > 400 give 5 units.   Quantity:  3 mL   Refills:  3       multivitamin, therapeutic with minerals Tabs tablet        Dose:  1 tablet   Take 1 tablet by mouth daily   Quantity:  30 each   Refills:  11       mycophenolate 250 MG capsule   Commonly known as:  CELLCEPT - GENERIC EQUIVALENT        Dose:  1000 mg   Take 4 capsules (1,000 mg) by mouth 2 times daily   Quantity:  240 capsule   Refills:  11       oxyCODONE  5 MG IR tablet   Commonly known as:  ROXICODONE        Dose:  5-10 mg   Take 1-2 tablets (5-10 mg) by mouth every 4 hours as needed for moderate to severe pain   Quantity:  40 tablet   Refills:  0       senna-docusate 8.6-50 MG per tablet   Commonly known as:  SENOKOT-S;PERICOLACE        Dose:  2 tablet   Take 2 tablets by mouth 2 times daily   Quantity:  100 tablet   Refills:  1       sulfamethoxazole-trimethoprim 400-80 MG per tablet   Commonly known as:  BACTRIM/SEPTRA   Indication:  PCP prophylaxis        Dose:  1 tablet   Take 1 tablet by mouth daily   Quantity:  30 tablet   Refills:  5       tacrolimus 1 MG capsule   Commonly known as:  PROGRAF - GENERIC EQUIVALENT        Dose:  4 mg   Take 4 capsules (4 mg) by mouth 2 times daily   Quantity:  180 capsule   Refills:  11       valGANciclovir 450 MG tablet   Commonly known as:  VALCYTE   Indication:  Treatment to Prevent Cytomegalovirus Disease        Dose:  450 mg   Take 1 tablet (450 mg) by mouth daily   Quantity:  60 tablet   Refills:  5       * Notice:  This list has 4 medication(s) that are the same as other medications prescribed for you. Read the directions carefully, and ask your doctor or other care provider to review them with you.      CONTINUE these medicines which may have CHANGED, or have new prescriptions. If we are uncertain of the size of tablets/capsules you have at home, strength may be listed as something that might have changed.        Dose / Directions    insulin glargine 100 UNIT/ML injection   Commonly known as:  LANTUS   This may have changed:    - how much to take  - when to take this        Dose:  45 Units   Inject 45 Units Subcutaneous every 24 hours   Quantity:  3 mL   Refills:  3         CONTINUE these medicines which have NOT CHANGED        Dose / Directions    cyclobenzaprine 10 MG tablet   Commonly known as:  FLEXERIL   Used for:  Bladder spasms        Dose:  10 mg   Take 1 tablet (10 mg) by mouth 3 times daily as needed for  muscle spasms   Quantity:  30 tablet   Refills:  0       gabapentin 300 MG capsule   Commonly known as:  NEURONTIN   Used for:  Type 2 diabetes mellitus with diabetic polyneuropathy, unspecified long term insulin use status (H)        Dose:  600 mg   Take 2 capsules (600 mg) by mouth At Bedtime   Quantity:  60 capsule   Refills:  0       glucagon 1 MG Solr injection   Used for:  Hypoglycemia        Dose:  1 mg   Inject 1 mg Subcutaneous every 15 minutes as needed for low blood sugar (May repeat x 1 only)   Quantity:  1 each   Refills:  0       lactobacillus rhamnosus (GG) capsule   Used for:  Imbalanced nutrition        Dose:  1 capsule   Take 1 capsule by mouth 2 times daily   Quantity:  60 capsule   Refills:  0       omeprazole 20 MG CR capsule   Commonly known as:  priLOSEC   Used for:  Gastroesophageal reflux disease without esophagitis        Dose:  20 mg   Take 1 capsule (20 mg) by mouth 2 times daily (before meals)   Quantity:  60 capsule   Refills:  0       ondansetron 4 MG ODT tab   Commonly known as:  ZOFRAN-ODT   Used for:  Cirrhosis of liver with ascites, unspecified hepatic cirrhosis type (H), Nausea        Dose:  4 mg   Take 1 tablet (4 mg) by mouth every 8 hours as needed for nausea   Quantity:  30 tablet   Refills:  0         STOP taking     furosemide 40 MG tablet   Commonly known as:  LASIX           insulin lispro 100 UNIT/ML injection   Commonly known as:  humaLOG           LISINOPRIL PO           rifaximin 550 MG Tabs tablet   Commonly known as:  XIFAXAN           spironolactone 50 MG tablet   Commonly known as:  ALDACTONE                Where to get your medicines      These medications were sent to [facility] - [place], [place] - [address] [address]     Phone:  [phone]     aspirin 325 MG tablet    clotrimazole 10 MG Lozg lozenge    insulin aspart 100 UNIT/ML injection    insulin aspart 100 UNIT/ML injection    insulin aspart 100  UNIT/ML injection    insulin aspart 100 UNIT/ML injection    insulin glargine 100 UNIT/ML injection    multivitamin, therapeutic with minerals Tabs tablet    mycophenolate 250 MG capsule    senna-docusate 8.6-50 MG per tablet    sulfamethoxazole-trimethoprim 400-80 MG per tablet    tacrolimus 1 MG capsule    valGANciclovir 450 MG tablet         Some of these will need a paper prescription and others can be bought over the counter. Ask your nurse if you have questions.     Bring a paper prescription for each of these medications     oxyCODONE 5 MG IR tablet                Protect others around you: Learn how to safely use, store and throw away your medicines at www.disposemymeds.org.             Medication List: This is a list of all your medications and when to take them. Check marks below indicate your daily home schedule. Keep this list as a reference.      Medications           Morning Afternoon Evening Bedtime As Needed    aspirin 325 MG tablet   1 tablet (325 mg) by Oral or Feeding Tube route daily   Last time this was given:  325 mg on 3/10/2017  8:09 AM                                clotrimazole 10 MG Lozg lozenge   Commonly known as:  MYCELEX   Place 1 lozenge (1 Beatrice) inside cheek 3 times daily   Last time this was given:  1 Beatrice on 3/10/2017  2:02 PM                                cyclobenzaprine 10 MG tablet   Commonly known as:  FLEXERIL   Take 1 tablet (10 mg) by mouth 3 times daily as needed for muscle spasms   Last time this was given:  10 mg on 3/10/2017  9:07 AM                                gabapentin 300 MG capsule   Commonly known as:  NEURONTIN   Take 2 capsules (600 mg) by mouth At Bedtime                                glucagon 1 MG Solr injection   Inject 1 mg Subcutaneous every 15 minutes as needed for low blood sugar (May repeat x 1 only)                                * insulin aspart 100 UNIT/ML injection   Commonly known as:  NovoLOG PEN   DOSE:  1 units per 8 grams of  carbohydrate. With meals and snacks. Only chart total amount of units given.  Do not give if pre-prandial glucose is less than 60 mg/dL.   Last time this was given:  8 Units on 3/10/2017 11:17 AM                                * insulin aspart 100 UNIT/ML injection   Commonly known as:  NovoLOG PEN   DOSE:  1 units per 10 grams of carbohydrate.  Only chart total amount of units given. HOLD CARB COVERAGE FOR  03/10/17 . ENDOCRINE TEAM WILL GIVE ALTERNATE ORDERS PAGER [phone]  Do not give if pre-prandial glucose is less than 60 mg/dL.   Last time this was given:  8 Units on 3/10/2017 11:17 AM                                * insulin aspart 100 UNIT/ML injection   Commonly known as:  NovoLOG PEN   Inject 1-10 Units Subcutaneous 3 times daily (before meals) For Pre-Meal  - 189 give 1 unit.  For Pre-Meal  - 239 give 2 units.  For Pre-Meal  - 289 give 3 units.  For Pre-Meal  - 339 give 4 units.  For Pre-Meal -399 give 5 units. For Pre-Meal -449 give 6 units. For Pre-Meal BG = or > 450 give 7 units.   Last time this was given:  8 Units on 3/10/2017 11:17 AM                                * insulin aspart 100 UNIT/ML injection   Commonly known as:  NovoLOG PEN   Inject 1-7 Units Subcutaneous At Bedtime Bedtime Blood Glucose (BG) For  - 249 give 1 units.  For  - 299 give 2 units.  For  - 349 give 3 units. For - 399 give 4 units.  For BG = or > 400 give 5 units.   Last time this was given:  8 Units on 3/10/2017 11:17 AM                                insulin glargine 100 UNIT/ML injection   Commonly known as:  LANTUS   Inject 45 Units Subcutaneous every 24 hours   Last time this was given:  45 Units on 3/9/2017  6:03 PM                                lactobacillus rhamnosus (GG) capsule   Take 1 capsule by mouth 2 times daily                                multivitamin, therapeutic with minerals Tabs tablet   Take 1 tablet by mouth daily   Last time this was given:  1  tablet on 3/10/2017  8:14 AM                                mycophenolate 250 MG capsule   Commonly known as:  CELLCEPT - GENERIC EQUIVALENT   Take 4 capsules (1,000 mg) by mouth 2 times daily   Last time this was given:  1,000 mg on 3/10/2017  8:14 AM                                omeprazole 20 MG CR capsule   Commonly known as:  priLOSEC   Take 1 capsule (20 mg) by mouth 2 times daily (before meals)   Last time this was given:  20 mg on 3/10/2017  8:14 AM                                ondansetron 4 MG ODT tab   Commonly known as:  ZOFRAN-ODT   Take 1 tablet (4 mg) by mouth every 8 hours as needed for nausea   Last time this was given:  4 mg on 3/4/2017  9:14 AM                                oxyCODONE 5 MG IR tablet   Commonly known as:  ROXICODONE   Take 1-2 tablets (5-10 mg) by mouth every 4 hours as needed for moderate to severe pain   Last time this was given:  10 mg on 3/10/2017  9:07 AM                                senna-docusate 8.6-50 MG per tablet   Commonly known as:  SENOKOT-S;PERICOLACE   Take 2 tablets by mouth 2 times daily   Last time this was given:  2 tablets on 3/10/2017  8:14 AM                                sulfamethoxazole-trimethoprim 400-80 MG per tablet   Commonly known as:  BACTRIM/SEPTRA   Take 1 tablet by mouth daily   Last time this was given:  1 tablet on 3/10/2017  8:14 AM                                tacrolimus 1 MG capsule   Commonly known as:  PROGRAF - GENERIC EQUIVALENT   Take 4 capsules (4 mg) by mouth 2 times daily   Last time this was given:  3 mg on 3/10/2017  8:14 AM                                valGANciclovir 450 MG tablet   Commonly known as:  VALCYTE   Take 1 tablet (450 mg) by mouth daily   Last time this was given:  450 mg on 3/10/2017  8:14 AM                                * Notice:  This list has 4 medication(s) that are the same as other medications prescribed for you. Read the directions carefully, and ask your doctor or other care provider to review them  with you.

## 2017-02-21 NOTE — PROGRESS NOTES
Paracentesis Nursing Note  Camacho Bhagat presents today to [facility] for [facility].    During today's appointment orders from Toñito Camejo MD were completed.    Progress Note:  Patient identification verified by name and date of birth.  Assessment completed.  Vitals monitored throughout appointment and recorded in Doc Flowsheets.  See proceduralist note in ultrasound.    Date of consent or authorization: 2/3/17.  Invasive Procedure Safety Checklist was completed and sent for scanning.     Paracentesis performed by Mitch Dinh PA-C Radiology.    The following labs were communicated to provider performing paracentesis:  Lab Results   Component Value Date    PLT 55 02/21/2017       Total amount of ascites fluid drained: 6.1 liters.  Color of ascites fluid: yellow.  Total amount of albumin given: 50  grams.    Patient tolerated procedure poorly. Pt came to appointment with weakness and altered mental status. Dr. Camejo pagejagjit and additional labs drawn.  Per labs; pt has hyperkalemia, hyperglycemia, and elevated liver enzymes.   Dr. Camejo paged with results and plan for pt to go to ED for evaluation. [facility] non-emergent transport called and they transported pt to ED at 1415.   Pt's mother present and will follow pt to ED.     Discharge Plan:  Discharge instructions were reviewed with patient.  Patient/Representative verbalized understanding and all questions were answered.   Discharged from [facility] in stable condition.    Ninfa Hope RN    Administrations This Visit     albumin human 25 % injection 12.5 g     Admin Date Action Dose Route Administered By             02/21/2017 New Bag 12.5 g Intravenous Ninfa Hope RN              Admin Date Action Dose Route Administered By             02/21/2017 New Bag 12.5 g Intravenous Ninfa Hope RN              Admin Date Action Dose Route Administered By             02/21/2017 New  Bag 12.5 g Intravenous Ninfa Hope RN              Admin Date Action Dose Route Administered By             02/21/2017 New Bag 12.5 g Intravenous Ninfa Hope RN                    lidocaine BUFFERED 1 % injection 20 mL     Admin Date Action Dose Route Administered By             02/21/2017 Given 20 mL Injection Ninfa Hope RN                          /59  Pulse 67  Wt 107.4 kg (236 lb 12.8 oz)  BMI 32.12 kg/m2

## 2017-02-21 NOTE — PROGRESS NOTES
Lab called at 1315 to inform writer of critical potassium lab value of 6.0. WOJCIECH Hope updated with information.     Inna Villasenor RN

## 2017-02-21 NOTE — ED NOTES
Bed: ED06  Expected date: 2/21/17  Expected time: 2:57 PM  Means of arrival: Ambulance  Comments:  [facility]  51 y M  Elevated LFTs, Jaundice,   Yellow

## 2017-02-21 NOTE — ED NOTES
Patient was at clinic today where his LOC was noted to be decreased from baseline. Had 5.2 L taken off during paracentesis. Received 50mg albumin. Labs abnormal.

## 2017-02-21 NOTE — ED PROVIDER NOTES
"  History     Chief Complaint   Patient presents with     Altered Mental Status     The history is provided by the EMS personnel and a parent. The history is limited by the condition of the patient.     Camacho Bhagat is a 51 year old male with a history of chronic liver failure secondary to non-alcoholic fatty liver disease complicated by hepatocellular carcinoma, on Lactulose, who was brought in by ambulance from the infusion center for evaluation of altered mental status. Patient's mom noted that patient appeared to be more confused and disoriented this morning. She states he refused to take his lactulose this morning and does not believe that he took it last night either. She states he has a history of hepatic encephalopathy and has been admitted several times in the past for HE and elevated ammonia. Patient was scheduled for paracentesis this afternoon at the infusion center and had 5.2L taken off. Patient's mom states that patient had difficulty getting ambulating and getting out of the car so patient was brought to the ED by ambulance for evaluation. Here in the ED, patient is minimally conversant and unable to contribute to the history. Patient's mom denies any recent fevers, falls, head injuries or trauma. She denies any signs of bleeding. She does note that patient has been yawning a lot today, which she states it not normal for him. Of note, patient was seen by endocrinology yesterday and patient's mom noted that he seemed to be a bit \"slow\" but denies any significant confusion at that time.     I have reviewed the Medications, Allergies, Past Medical and Surgical History, and Social History in the Epic system.    Past Medical History   Diagnosis Date     Acute venous embolism and thrombosis of other specified veins 11/01     Deep vein thrombophlebitis, filter placed     Cancer (H)      Depressive disorder, not elsewhere classified      Esophageal reflux      Fibromyalgia 1/2009     dx with Dr Benitez( " Rheum)     Osteoarthritis      Other and unspecified hyperlipidemia      Other chronic nonalcoholic liver disease      Fatty liver      Other testicular hypofunction      Pneumonia, organism unspecified 10-01     Included ARDS, sepsis, and  acute renal failure; hospitalized     Rheumatoid arthritis(714.0)      Type II or unspecified type diabetes mellitus without mention of complication, not stated as uncontrolled 11/01     Managed by endocrinology     Unspecified essential hypertension 11/01     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     Unspecified sleep apnea      Uses BiPAP       Past Surgical History   Procedure Laterality Date     C nonspecific procedure       tracheostomy     C nonspecific procedure       repair of deviated septum     C nonspecific procedure  2007     Rt knee arthroscopy     C total knee arthroplasty  2008     Right knee arthroscopy     Cholecystectomy       Esophagoscopy, gastroscopy, duodenoscopy (egd), combined N/A 8/4/2016     Procedure: COMBINED ESOPHAGOSCOPY, GASTROSCOPY, DUODENOSCOPY (EGD), BIOPSY SINGLE OR MULTIPLE;  Surgeon: Trent Pederson MD;  Location:  GI       Family History   Problem Relation Age of Onset     DIABETES Father      Hypertension Father      Substance Abuse Father      Arthritis Mother      CANCER Mother      Thyroid     Thyroid Disease Mother      Other Cancer Mother      Colon Cancer No family hx of      Hyperlipidemia No family hx of      Coronary Artery Disease No family hx of      CEREBROVASCULAR DISEASE No family hx of      Breast Cancer No family hx of      Prostate Cancer No family hx of        Social History   Substance Use Topics     Smoking status: Former Smoker     Smokeless tobacco: Former User     Types: Chew     Quit date: 10/8/2015      Comment: Has used chewing tobacco since age 15 , chewed for 20yrs      Alcohol use No      Comment: last drink about a year ago (quit 2001)     Current Facility-Administered Medications   Medication      insulin (regular) (HumuLIN R/NovoLIN R) injection 10 Units     dextrose 50 % injection 25 mL     lactulose (CHRONULAC) solution 20 g     dextrose 50 % injection 25 g     piperacillin-tazobactam (ZOSYN) 4.5 g vial to attach to  mL bag     Daily 2 GRAM acetaminophen limit, unless fulminent liver failure or transaminases greater than or equal to 300 - 400, then none     lactulose (CHRONULAC) solution 20 g    Or     lactulose (CHRONULAC) solution for enema prep 100 g     thiamine tablet 100 mg    Or     thiamine (B-1) injection 100 mg     folic acid (FOLVITE) tablet 1 mg    Or     folic acid injection 1 mg     naloxone (NARCAN) injection 0.1-0.4 mg     glucose 40 % gel 15-30 g    Or     dextrose 50 % injection 25-50 mL    Or     glucagon injection 1 mg     insulin glargine (LANTUS) injection 20 Units     insulin aspart (NovoLOG) inj (RAPID ACTING)     insulin aspart (NovoLOG) inj (RAPID ACTING)     dextrose 50 % injection 25 g     0.9% sodium chloride BOLUS     Facility-Administered Medications Ordered in Other Encounters   Medication     albumin human 25 % injection 12.5 g        Allergies   Allergen Reactions     Erythromycin GI Disturbance     Vioxx      Nausea, vomiting       Review of Systems   Unable to perform ROS: Mental status change   Constitutional: Positive for activity change, appetite change and fatigue. Negative for fever.   HENT: Negative for congestion and nosebleeds.    Eyes: Negative for redness.   Respiratory: Negative for choking, chest tightness and shortness of breath. Cough: no current cough.    Cardiovascular: Positive for leg swelling (bilateral). Negative for chest pain.   Gastrointestinal: Negative for abdominal pain, diarrhea, nausea and vomiting.   Genitourinary: Negative for difficulty urinating and flank pain.   Musculoskeletal: Negative for arthralgias, back pain and neck stiffness.   Skin: Positive for color change (icterus).   Allergic/Immunologic: Negative for immunocompromised  state.   Neurological: Positive for weakness. Negative for light-headedness and headaches.   Hematological: Bruises/bleeds easily.   Psychiatric/Behavioral: Positive for confusion, decreased concentration and sleep disturbance (increasing lethargy). Negative for hallucinations.   All other systems reviewed and are negative.      Physical Exam   BP: 170/76  Pulse: 70  Temp: 97.1  F (36.2  C)  Resp: 18  SpO2: 99 %  Physical Exam   Constitutional: He appears well-developed and well-nourished. He appears distressed.   Patient lethargic in ER but arousable to voice.   HENT:   Head: Normocephalic and atraumatic.   Mouth/Throat: Oropharynx is clear and moist. No oropharyngeal exudate.   Eyes: Pupils are equal, round, and reactive to light. Scleral icterus is present.   Neck: Normal range of motion. Neck supple. No JVD present.   Cardiovascular: Regular rhythm, normal heart sounds and intact distal pulses.    Pulmonary/Chest: Breath sounds normal. No stridor. No respiratory distress. He has no wheezes. He has no rales.   Abdominal: Soft. Bowel sounds are normal. He exhibits distension. He exhibits no mass. There is no tenderness. There is no rebound and no guarding.   Abdomen status post paracentesis without any tenderness rebound or guarding noted   Musculoskeletal: He exhibits edema. He exhibits no tenderness.   Neurological: He is alert. No cranial nerve deficit. Coordination normal.   No neuro focal findings seen at this point patient lethargic but arousable voice airway intact   Skin: Skin is warm and dry. No rash noted. He is not diaphoretic. No erythema. No pallor.   Psychiatric:   Flat otherwise unable to assess   Nursing note and vitals reviewed.      ED Course     ED Course     Procedures   3:15 PM  The patient was seen and examined by Dr. Mills in Room ED06.          History extensively given by mother who is here.  Records are reviewed also with recent hospital physician for hyperkalemia hyponatremia at [place]  low.  IV established labs drawn.  EKG without marked T-wave changes  Head CT done negative for any intracranial bleed  Chest x-ray reveals left basilar opacity could be atelectasis  Pro calcitonin 1.65 lipase 326  White count 6.3 hemoglobin 10.4 INR 1.64.  Sodium 124 potassium 6 creatinine 1.46 glucose for 10 bicarb 21 total bilirubin 31.8  Alk phos 270 ALT 87   Ammonia noted to be 190.  Discussed case with medicine.  Patient had insulin ordered along with sodium bicarb IV and lactulose orally.    Patient seen by medicine service down here planning to admit to  once a bed is available.  Weight is been stable etc. vital stable.  Patient not hypoxic.  No fever noted.               EKG Interpretation:      Interpreted by Alirio Mills  Time reviewed: 1739  Symptoms at time of EKG: Hyperkalemia   Rhythm: normal sinus   Rate: normal  Axis: normal  Ectopy: none  Conduction: normal  ST Segments/ T Waves: No hyperacute ST-T wave changes  Q Waves: none  Comparison to prior: No old EKG available    Clinical Impression: normal EKG          Critical Care time:  none               Labs Ordered and Resulted from Time of ED Arrival Up to the Time of Departure from the ED   AMMONIA - Abnormal; Notable for the following:        Result Value    Ammonia 190 (*)     All other components within normal limits   COMPREHENSIVE METABOLIC PANEL - Abnormal; Notable for the following:     Sodium 124 (*)     Potassium 6.0 (*)     Chloride 90 (*)     Glucose 410 (*)     Urea Nitrogen 44 (*)     Creatinine 1.46 (*)     GFR Estimate 51 (*)     Calcium 8.4 (*)     Bilirubin Total 31.8 (*)     Albumin 3.2 (*)     Protein Total 5.2 (*)     Alkaline Phosphatase 270 (*)     ALT 87 (*)      (*)     All other components within normal limits   INR - Abnormal; Notable for the following:     INR 1.64 (*)     All other components within normal limits   CBC WITH PLATELETS DIFFERENTIAL - Abnormal; Notable for the following:     RBC Count  3.08 (*)     Hemoglobin 10.4 (*)     Hematocrit 29.9 (*)     MCH 33.8 (*)     RDW 15.6 (*)     Platelet Count 36 (*)     Absolute Lymphocytes 0.4 (*)     All other components within normal limits   GLUCOSE BY METER - Abnormal; Notable for the following:     Glucose 387 (*)     All other components within normal limits   LACTIC ACID WHOLE BLOOD - Abnormal; Notable for the following:     Lactic Acid 2.5 (*)     All other components within normal limits   CBC WITH PLATELETS - Abnormal; Notable for the following:     RBC Count 3.11 (*)     Hemoglobin 10.9 (*)     Hematocrit 30.1 (*)     MCH 35.0 (*)     RDW 15.8 (*)     Platelet Count 38 (*)     All other components within normal limits   PARTIAL THROMBOPLASTIN TIME   LIPASE   STRICT INTAKE AND OUTPUT   NURSE INITIATED LACTULOSE PROTOCOL   ASSESS     Results for orders placed or performed during the hospital encounter of 02/21/17   XR Chest Port 1 View    Narrative    Exam: XR CHEST PORT 1 VW, 2/21/2017 3:40 PM    Indication: altered mental status    Comparison: Chest x-ray 2/15/2017    Findings:   Frontal projection radiograph of the chest. Left costophrenic angle is  not included. Low lung volume. Left basilar opacity. Cardiac  silhouette is unchanged. Visualized upper abdominal osseous structures  are unremarkable. No pleural effusion or pneumothorax.      Impression    Impression: Left basilar opacity, likely representing aspiration or  pneumonia or atelectasis.    I have personally reviewed the examination and initial interpretation  and I agree with the findings.    DAMIEN LIRA MD   CT Head w/o Contrast    Narrative    CT HEAD W/O CONTRAST 2/21/2017 3:58 PM    Provided History: headache/LOC change    Comparison: Head CT 12/9/2016.    Technique: Using multidetector thin collimation helical acquisition  technique, axial, coronal and sagittal CT images from the skull base  to the vertex were obtained without intravenous contrast.     Findings:    No intracranial  hemorrhage, mass effect, or midline shift. The  ventricles are proportionate to the cerebral sulci. The gray to white  matter differentiation of the cerebral hemispheres is preserved. The  basal cisterns are patent.    The visualized paranasal sinuses are clear. The mastoid air cells are  clear.       Impression    Impression: No acute intracranial pathology. No change since  12/9/2016.    HAYDEN MUIR MD   Ammonia (on ice)   Result Value Ref Range    Ammonia 190 (HH) 10 - 50 umol/L   Comprehensive metabolic panel   Result Value Ref Range    Sodium 124 (L) 133 - 144 mmol/L    Potassium 6.0 (H) 3.4 - 5.3 mmol/L    Chloride 90 (L) 94 - 109 mmol/L    Carbon Dioxide 21 20 - 32 mmol/L    Anion Gap 13 3 - 14 mmol/L    Glucose 410 (H) 70 - 99 mg/dL    Urea Nitrogen 44 (H) 7 - 30 mg/dL    Creatinine 1.46 (H) 0.66 - 1.25 mg/dL    GFR Estimate 51 (L) >60 mL/min/1.7m2    GFR Estimate If Black 61 >60 mL/min/1.7m2    Calcium 8.4 (L) 8.5 - 10.1 mg/dL    Bilirubin Total 31.8 (HH) 0.2 - 1.3 mg/dL    Albumin 3.2 (L) 3.4 - 5.0 g/dL    Protein Total 5.2 (L) 6.8 - 8.8 g/dL    Alkaline Phosphatase 270 (H) 40 - 150 U/L    ALT 87 (H) 0 - 70 U/L     (H) 0 - 45 U/L   INR   Result Value Ref Range    INR 1.64 (H) 0.86 - 1.14   Partial thromboplastin time   Result Value Ref Range    PTT 35 22 - 37 sec   CBC with platelets differential   Result Value Ref Range    WBC 6.3 4.0 - 11.0 10e9/L    RBC Count 3.08 (L) 4.4 - 5.9 10e12/L    Hemoglobin 10.4 (L) 13.3 - 17.7 g/dL    Hematocrit 29.9 (L) 40.0 - 53.0 %    MCV 97 78 - 100 fl    MCH 33.8 (H) 26.5 - 33.0 pg    MCHC 34.8 31.5 - 36.5 g/dL    RDW 15.6 (H) 10.0 - 15.0 %    Platelet Count 36 (LL) 150 - 450 10e9/L    Diff Method Automated Method     % Neutrophils 88.1 %    % Lymphocytes 6.7 %    % Monocytes 2.6 %    % Eosinophils 2.1 %    % Basophils 0.2 %    % Immature Granulocytes 0.3 %    Nucleated RBCs 0 0 /100    Absolute Neutrophil 5.5 1.6 - 8.3 10e9/L    Absolute Lymphocytes 0.4 (L)  0.8 - 5.3 10e9/L    Absolute Monocytes 0.2 0.0 - 1.3 10e9/L    Absolute Eosinophils 0.1 0.0 - 0.7 10e9/L    Absolute Basophils 0.0 0.0 - 0.2 10e9/L    Abs Immature Granulocytes 0.0 0 - 0.4 10e9/L    Absolute Nucleated RBC 0.0     Platelet Estimate Confirming automated cell count    UA reflex to Microscopic and Culture   Result Value Ref Range    Color Urine Dark Yellow     Appearance Urine Slightly Cloudy     Glucose Urine >1000 (A) NEG mg/dL    Bilirubin Urine (A) NEG     Moderate   This is an unconfirmed screening test result. A positive result may be false.      Ketones Urine Negative NEG mg/dL    Specific Gravity Urine 1.010 1.003 - 1.035    Blood Urine Negative NEG    pH Urine 5.5 5.0 - 7.0 pH    Protein Albumin Urine Negative NEG mg/dL    Urobilinogen mg/dL Normal 0.0 - 2.0 mg/dL    Nitrite Urine Negative NEG    Leukocyte Esterase Urine Negative NEG    Source Pending     RBC Urine 1 0 - 2 /HPF    WBC Urine 5 (H) 0 - 2 /HPF    Transitional Epi <1 0 - 1 /HPF    Mucous Urine Present (A) NEG /LPF    Hyaline Casts 4 (H) 0 - 2 /LPF    Wbc Cast 4 (A) NEG /LPF   Lipase   Result Value Ref Range    Lipase 326 73 - 393 U/L   Glucose by meter   Result Value Ref Range    Glucose 387 (H) 70 - 99 mg/dL   Lactic acid whole blood   Result Value Ref Range    Lactic Acid 2.5 (H) 0.7 - 2.1 mmol/L   Procalcitonin   Result Value Ref Range    Procalcitonin 1.65 ng/ml   Basic metabolic panel   Result Value Ref Range    Sodium 126 (L) 133 - 144 mmol/L    Potassium 5.9 (H) 3.4 - 5.3 mmol/L    Chloride 94 94 - 109 mmol/L    Carbon Dioxide 21 20 - 32 mmol/L    Anion Gap 12 3 - 14 mmol/L    Glucose 384 (H) 70 - 99 mg/dL    Urea Nitrogen 50 (H) 7 - 30 mg/dL    Creatinine 1.64 (H) 0.66 - 1.25 mg/dL    GFR Estimate 44 (L) >60 mL/min/1.7m2    GFR Estimate If Black 54 (L) >60 mL/min/1.7m2    Calcium 8.6 8.5 - 10.1 mg/dL   CBC with platelets   Result Value Ref Range    WBC 6.2 4.0 - 11.0 10e9/L    RBC Count 3.11 (L) 4.4 - 5.9 10e12/L     Hemoglobin 10.9 (L) 13.3 - 17.7 g/dL    Hematocrit 30.1 (L) 40.0 - 53.0 %    MCV 97 78 - 100 fl    MCH 35.0 (H) 26.5 - 33.0 pg    MCHC 36.2 31.5 - 36.5 g/dL    RDW 15.8 (H) 10.0 - 15.0 %    Platelet Count 38 (LL) 150 - 450 10e9/L   Magnesium   Result Value Ref Range    Magnesium 2.6 (H) 1.6 - 2.3 mg/dL   Procalcitonin   Result Value Ref Range    Procalcitonin 1.79 ng/ml   Potassium   Result Value Ref Range    Potassium 5.1 3.4 - 5.3 mmol/L   Sodium   Result Value Ref Range    Sodium 129 (L) 133 - 144 mmol/L   Lactic acid whole blood   Result Value Ref Range    Lactic Acid 2.0 0.7 - 2.1 mmol/L   Glucose by meter   Result Value Ref Range    Glucose 275 (H) 70 - 99 mg/dL   Glucose by meter   Result Value Ref Range    Glucose 252 (H) 70 - 99 mg/dL   Glucose by meter   Result Value Ref Range    Glucose 278 (H) 70 - 99 mg/dL   Glucose by meter   Result Value Ref Range    Glucose 204 (H) 70 - 99 mg/dL   Glucose by meter   Result Value Ref Range    Glucose 170 (H) 70 - 99 mg/dL   Glucose by meter   Result Value Ref Range    Glucose 165 (H) 70 - 99 mg/dL   Glucose by meter   Result Value Ref Range    Glucose 182 (H) 70 - 99 mg/dL   EKG 12 lead   Result Value Ref Range    Interpretation ECG Click View Image link to view waveform and result        Assessments & Plan (with Medical Decision Making)  51-year-old male with known alcoholic liver disease presents with increasing lethargy tenderness but no airway compromise elevated bilirubin and ammonia 190.  No Signs of GI bleeding head CT negative paracentesis done today in clinic and then sent here.  Mild hyperkalemia potassium 6 without EKG changes.  Sodium 126 creatinine 1.64.  Patient be admitted to medicine given/ordered lactulose down here in the ER along with this IV sodium bicarb along with insulin.  [place] [facility].  No sign of infection this point.  Most likely hepatic encephalopathy with chronic hyponatremia with hyperkalemia with  chronic kidney disease          I have reviewed the nursing notes.    I have reviewed the findings, diagnosis, plan and need for follow up with the patient.    Current Discharge Medication List          Final diagnoses:   Hepatic encephalopathy (H)   Hyperkalemia   Biliary cirrhosis (H)   Hyperammonemia (H)   Acute renal failure, unspecified acute renal failure type (H)   Altered mental status, unspecified altered mental status type   Hyponatremia   I, Felicia Hannah, am serving as a trained medical scribe to document services personally performed by Alirio Mills MD, based on the provider's statements to me.      IAlirio MD, was physically present and have reviewed and verified the accuracy of this note documented by Felicia Hannah.       2/21/2017   [facility], [facility] EMERGENCY DEPARTMENT    This note was created at least in part by the use of dragon voice dictation system. Inadvertent typographical errors may still exist.  Alirio Mills MD.         Alirio Mills MD  02/22/17 0153

## 2017-02-22 LAB
ALBUMIN SERPL-MCNC: 3.3 G/DL (ref 3.4–5)
ALBUMIN UR-MCNC: NEGATIVE MG/DL
ALP SERPL-CCNC: 211 U/L (ref 40–150)
ALT SERPL W P-5'-P-CCNC: 77 U/L (ref 0–70)
ANION GAP SERPL CALCULATED.3IONS-SCNC: 12 MMOL/L (ref 3–14)
APPEARANCE UR: ABNORMAL
AST SERPL W P-5'-P-CCNC: 130 U/L (ref 0–45)
BILIRUB SERPL-MCNC: 31.1 MG/DL (ref 0.2–1.3)
BILIRUB UR QL STRIP: ABNORMAL
BUN SERPL-MCNC: 46 MG/DL (ref 7–30)
CALCIUM SERPL-MCNC: 9 MG/DL (ref 8.5–10.1)
CHLORIDE SERPL-SCNC: 100 MMOL/L (ref 94–109)
CO2 SERPL-SCNC: 22 MMOL/L (ref 20–32)
COLOR UR AUTO: ABNORMAL
CREAT SERPL-MCNC: 1.4 MG/DL (ref 0.66–1.25)
ERYTHROCYTE [DISTWIDTH] IN BLOOD BY AUTOMATED COUNT: 15.6 % (ref 10–15)
GFR SERPL CREATININE-BSD FRML MDRD: 53 ML/MIN/1.7M2
GLUCOSE BLDC GLUCOMTR-MCNC: 136 MG/DL (ref 70–99)
GLUCOSE BLDC GLUCOMTR-MCNC: 138 MG/DL (ref 70–99)
GLUCOSE BLDC GLUCOMTR-MCNC: 142 MG/DL (ref 70–99)
GLUCOSE BLDC GLUCOMTR-MCNC: 149 MG/DL (ref 70–99)
GLUCOSE BLDC GLUCOMTR-MCNC: 151 MG/DL (ref 70–99)
GLUCOSE BLDC GLUCOMTR-MCNC: 152 MG/DL (ref 70–99)
GLUCOSE BLDC GLUCOMTR-MCNC: 153 MG/DL (ref 70–99)
GLUCOSE BLDC GLUCOMTR-MCNC: 165 MG/DL (ref 70–99)
GLUCOSE BLDC GLUCOMTR-MCNC: 167 MG/DL (ref 70–99)
GLUCOSE BLDC GLUCOMTR-MCNC: 170 MG/DL (ref 70–99)
GLUCOSE BLDC GLUCOMTR-MCNC: 182 MG/DL (ref 70–99)
GLUCOSE BLDC GLUCOMTR-MCNC: 200 MG/DL (ref 70–99)
GLUCOSE BLDC GLUCOMTR-MCNC: 238 MG/DL (ref 70–99)
GLUCOSE BLDC GLUCOMTR-MCNC: 308 MG/DL (ref 70–99)
GLUCOSE BLDC GLUCOMTR-MCNC: 337 MG/DL (ref 70–99)
GLUCOSE BLDC GLUCOMTR-MCNC: 398 MG/DL (ref 70–99)
GLUCOSE SERPL-MCNC: 147 MG/DL (ref 70–99)
GLUCOSE UR STRIP-MCNC: >1000 MG/DL
HCT VFR BLD AUTO: 29.5 % (ref 40–53)
HGB BLD-MCNC: 10.3 G/DL (ref 13.3–17.7)
HGB UR QL STRIP: NEGATIVE
HYALINE CASTS #/AREA URNS LPF: 4 /LPF (ref 0–2)
INR PPP: 1.67 (ref 0.86–1.14)
INTERPRETATION ECG - MUSE: NORMAL
KETONES UR STRIP-MCNC: NEGATIVE MG/DL
LACTATE BLD-SCNC: 1.9 MMOL/L (ref 0.7–2.1)
LACTATE BLD-SCNC: 2 MMOL/L (ref 0.7–2.1)
LEUKOCYTE ESTERASE UR QL STRIP: NEGATIVE
MCH RBC QN AUTO: 34 PG (ref 26.5–33)
MCHC RBC AUTO-ENTMCNC: 34.9 G/DL (ref 31.5–36.5)
MCV RBC AUTO: 97 FL (ref 78–100)
MUCOUS THREADS #/AREA URNS LPF: PRESENT /LPF
NITRATE UR QL: NEGATIVE
PH UR STRIP: 5.5 PH (ref 5–7)
PLATELET # BLD AUTO: 37 10E9/L (ref 150–450)
POTASSIUM SERPL-SCNC: 4.8 MMOL/L (ref 3.4–5.3)
POTASSIUM SERPL-SCNC: 5.1 MMOL/L (ref 3.4–5.3)
PROCALCITONIN SERPL-MCNC: 1.76 NG/ML
PROT SERPL-MCNC: 5.4 G/DL (ref 6.8–8.8)
RBC # BLD AUTO: 3.03 10E12/L (ref 4.4–5.9)
RBC #/AREA URNS AUTO: 1 /HPF (ref 0–2)
SODIUM SERPL-SCNC: 126 MMOL/L (ref 133–144)
SODIUM SERPL-SCNC: 129 MMOL/L (ref 133–144)
SODIUM SERPL-SCNC: 133 MMOL/L (ref 133–144)
SP GR UR STRIP: 1.01 (ref 1–1.03)
TRANS CELLS #/AREA URNS HPF: <1 /HPF (ref 0–1)
URN SPEC COLLECT METH UR: ABNORMAL
UROBILINOGEN UR STRIP-MCNC: NORMAL MG/DL (ref 0–2)
WBC # BLD AUTO: 5.1 10E9/L (ref 4–11)
WBC #/AREA URNS AUTO: 5 /HPF (ref 0–2)
WBC CASTS #/AREA URNS LPF: 4 /LPF

## 2017-02-22 PROCEDURE — 84145 PROCALCITONIN (PCT): CPT | Performed by: INTERNAL MEDICINE

## 2017-02-22 PROCEDURE — 36415 COLL VENOUS BLD VENIPUNCTURE: CPT | Performed by: INTERNAL MEDICINE

## 2017-02-22 PROCEDURE — 85027 COMPLETE CBC AUTOMATED: CPT | Performed by: INTERNAL MEDICINE

## 2017-02-22 PROCEDURE — 80053 COMPREHEN METABOLIC PANEL: CPT | Performed by: INTERNAL MEDICINE

## 2017-02-22 PROCEDURE — 25000132 ZZH RX MED GY IP 250 OP 250 PS 637: Performed by: INTERNAL MEDICINE

## 2017-02-22 PROCEDURE — 85610 PROTHROMBIN TIME: CPT | Performed by: INTERNAL MEDICINE

## 2017-02-22 PROCEDURE — 00000146 ZZHCL STATISTIC GLUCOSE BY METER IP

## 2017-02-22 PROCEDURE — 84295 ASSAY OF SERUM SODIUM: CPT | Performed by: STUDENT IN AN ORGANIZED HEALTH CARE EDUCATION/TRAINING PROGRAM

## 2017-02-22 PROCEDURE — 36415 COLL VENOUS BLD VENIPUNCTURE: CPT | Performed by: STUDENT IN AN ORGANIZED HEALTH CARE EDUCATION/TRAINING PROGRAM

## 2017-02-22 PROCEDURE — 84132 ASSAY OF SERUM POTASSIUM: CPT | Performed by: INTERNAL MEDICINE

## 2017-02-22 PROCEDURE — 25000128 H RX IP 250 OP 636: Performed by: INTERNAL MEDICINE

## 2017-02-22 PROCEDURE — 84295 ASSAY OF SERUM SODIUM: CPT | Performed by: INTERNAL MEDICINE

## 2017-02-22 PROCEDURE — 25000131 ZZH RX MED GY IP 250 OP 636 PS 637: Performed by: INTERNAL MEDICINE

## 2017-02-22 PROCEDURE — 83605 ASSAY OF LACTIC ACID: CPT | Performed by: INTERNAL MEDICINE

## 2017-02-22 PROCEDURE — 12000006 ZZH R&B IMCU INTERMEDIATE UMMC

## 2017-02-22 PROCEDURE — 25800025 ZZH RX 258: Performed by: INTERNAL MEDICINE

## 2017-02-22 PROCEDURE — 81001 URINALYSIS AUTO W/SCOPE: CPT | Performed by: FAMILY MEDICINE

## 2017-02-22 PROCEDURE — 99233 SBSQ HOSP IP/OBS HIGH 50: CPT | Mod: GC | Performed by: PEDIATRICS

## 2017-02-22 PROCEDURE — 25000131 ZZH RX MED GY IP 250 OP 636 PS 637: Performed by: STUDENT IN AN ORGANIZED HEALTH CARE EDUCATION/TRAINING PROGRAM

## 2017-02-22 RX ORDER — LACTULOSE 10 G/15ML
10 SOLUTION ORAL 3 TIMES DAILY
Status: DISCONTINUED | OUTPATIENT
Start: 2017-02-22 | End: 2017-02-26

## 2017-02-22 RX ORDER — DEXTROSE MONOHYDRATE 25 G/50ML
25 INJECTION, SOLUTION INTRAVENOUS ONCE
Status: COMPLETED | OUTPATIENT
Start: 2017-02-22 | End: 2017-02-22

## 2017-02-22 RX ADMIN — RIFAXIMIN 550 MG: 550 TABLET ORAL at 20:12

## 2017-02-22 RX ADMIN — INSULIN ASPART 4 UNITS: 100 INJECTION, SOLUTION INTRAVENOUS; SUBCUTANEOUS at 17:43

## 2017-02-22 RX ADMIN — LACTULOSE 20 G: 20 SOLUTION ORAL at 22:21

## 2017-02-22 RX ADMIN — PIPERACILLIN AND TAZOBACTAM 4.5 G: 4; .5 INJECTION, POWDER, FOR SOLUTION INTRAVENOUS at 08:37

## 2017-02-22 RX ADMIN — DEXTROSE MONOHYDRATE 25 ML: 500 INJECTION PARENTERAL at 01:53

## 2017-02-22 RX ADMIN — LACTULOSE 100 G: 10 SOLUTION ORAL; RECTAL at 00:43

## 2017-02-22 RX ADMIN — LACTULOSE 10 G: 20 SOLUTION ORAL at 18:34

## 2017-02-22 RX ADMIN — FOLIC ACID 1 MG: 1 TABLET ORAL at 08:45

## 2017-02-22 RX ADMIN — INSULIN GLARGINE 20 UNITS: 100 INJECTION, SOLUTION SUBCUTANEOUS at 10:22

## 2017-02-22 RX ADMIN — LACTULOSE 10 G: 20 SOLUTION ORAL at 08:46

## 2017-02-22 RX ADMIN — PIPERACILLIN AND TAZOBACTAM 4.5 G: 4; .5 INJECTION, POWDER, FOR SOLUTION INTRAVENOUS at 12:26

## 2017-02-22 RX ADMIN — LACTULOSE 10 G: 20 SOLUTION ORAL at 12:27

## 2017-02-22 RX ADMIN — PIPERACILLIN AND TAZOBACTAM 4.5 G: 4; .5 INJECTION, POWDER, FOR SOLUTION INTRAVENOUS at 18:33

## 2017-02-22 RX ADMIN — OMEPRAZOLE 20 MG: 20 CAPSULE, DELAYED RELEASE ORAL at 15:58

## 2017-02-22 RX ADMIN — HUMAN INSULIN 10 UNITS: 100 INJECTION, SOLUTION SUBCUTANEOUS at 01:53

## 2017-02-22 RX ADMIN — INSULIN ASPART 2 UNITS: 100 INJECTION, SOLUTION INTRAVENOUS; SUBCUTANEOUS at 12:25

## 2017-02-22 RX ADMIN — Medication 100 MG: at 08:45

## 2017-02-22 RX ADMIN — PIPERACILLIN AND TAZOBACTAM 4.5 G: 4; .5 INJECTION, POWDER, FOR SOLUTION INTRAVENOUS at 01:53

## 2017-02-22 ASSESSMENT — ENCOUNTER SYMPTOMS
HALLUCINATIONS: 0
APPETITE CHANGE: 1
NECK STIFFNESS: 0
CONFUSION: 1
HEADACHES: 0
VOMITING: 0
FATIGUE: 1
ABDOMINAL PAIN: 0
DECREASED CONCENTRATION: 1
CHEST TIGHTNESS: 0
FEVER: 0
BRUISES/BLEEDS EASILY: 1
WEAKNESS: 1
NAUSEA: 0
BACK PAIN: 0
SLEEP DISTURBANCE: 1
CHOKING: 0
SHORTNESS OF BREATH: 0
LIGHT-HEADEDNESS: 0
ACTIVITY CHANGE: 1
ARTHRALGIAS: 0
COLOR CHANGE: 1
EYE REDNESS: 0
FLANK PAIN: 0
DIARRHEA: 0
DIFFICULTY URINATING: 0

## 2017-02-22 ASSESSMENT — ACTIVITIES OF DAILY LIVING (ADL)
RETIRED_COMMUNICATION: 0-->UNDERSTANDS/COMMUNICATES WITHOUT DIFFICULTY
BATHING: 0-->INDEPENDENT
RETIRED_EATING: 0-->INDEPENDENT
COGNITION: 0 - NO COGNITION ISSUES REPORTED
SWALLOWING: 0-->SWALLOWS FOODS/LIQUIDS WITHOUT DIFFICULTY
TOILETING: 0-->INDEPENDENT
FALL_HISTORY_WITHIN_LAST_SIX_MONTHS: NO
DRESS: 0-->INDEPENDENT
AMBULATION: 0-->INDEPENDENT
TRANSFERRING: 0-->INDEPENDENT

## 2017-02-22 ASSESSMENT — PAIN DESCRIPTION - DESCRIPTORS
DESCRIPTORS: PATIENT UNABLE TO DESCRIBE
DESCRIPTORS: PATIENT UNABLE TO DESCRIBE
DESCRIPTORS: CRAMPING

## 2017-02-22 NOTE — H&P
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    History gathered from chart review. Patient is a poor historian 2/2 AMS. Patient's family unable to be reached currently. Patient was seen in infusion clinic this AM and was noted to be confused. 6.1L removed from paracentesis. Per ED provider note, \"Patient's mom states that patient had difficulty getting ambulating and getting out of the car so patient was brought to the ED by ambulance for evaluation.\"    In terms of patient's liver history, patient is on the wait list for liver transplantation for cirrhosis caused by nonalcoholic fatty liver disease and complicated by hepatocellular carcinoma. He is status post chemoembolization x2 and radioembolization x1. He has had upper endoscopy procedure in 08/2016, which showed grade 1 esophageal varices. Patient has had a recent hospitalizations in 12/16 for AMS. Pt presented with generalized confusion and lethargy despite taking normal doses of lactulose. He was agitated and refusing to get out of bed or take any further lactulose. Paracentesis on admission was negative for SBP and cultures remained negative. He was given increasing doses of lactulose including enemas and rifaximin was added.           Past Medical History:     Past Medical History   Diagnosis Date     Acute venous embolism and thrombosis of other specified veins 11/01     Deep vein thrombophlebitis, filter placed     Cancer (H)      Depressive disorder, " not elsewhere classified      Esophageal reflux      Fibromyalgia 1/2009     dx with Dr Benitez( Rheum)     Osteoarthritis      Other and unspecified hyperlipidemia      Other chronic nonalcoholic liver disease      Fatty liver      Other testicular hypofunction      Pneumonia, organism unspecified 10-01     Included ARDS, sepsis, and  acute renal failure; hospitalized     Rheumatoid arthritis(714.0)      Type II or unspecified type diabetes mellitus without mention of complication, not stated as uncontrolled 11/01     Managed by endocrinology     Unspecified essential hypertension 11/01     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     Unspecified sleep apnea      Uses BiPAP          Past Surgical History:     Past Surgical History   Procedure Laterality Date     C nonspecific procedure       tracheostomy     C nonspecific procedure       repair of deviated septum     C nonspecific procedure  2007     Rt knee arthroscopy     C total knee arthroplasty  2008     Right knee arthroscopy     Cholecystectomy       Esophagoscopy, gastroscopy, duodenoscopy (egd), combined N/A 8/4/2016     Procedure: COMBINED ESOPHAGOSCOPY, GASTROSCOPY, DUODENOSCOPY (EGD), BIOPSY SINGLE OR MULTIPLE;  Surgeon: Trent Pederson MD;  Location:  GI            Social History:     Social History     Social History     Marital status:      Spouse name: N/A     Number of children: 1     Years of education: N/A     Occupational History            Social History Main Topics     Smoking status: Former Smoker     Smokeless tobacco: Former User     Types: Chew     Quit date: 10/8/2015      Comment: Has used chewing tobacco since age 15 , chewed for 20yrs      Alcohol use No      Comment: last drink about a year ago (quit 2001)     Drug use: No     Sexual activity: Yes     Partners: Female     Other Topics Concern     Not on file     Social History Narrative    uSED TO BE      Back in school now               Family History:     Family History   Problem Relation Age of Onset     DIABETES Father      Hypertension Father      Substance Abuse Father      Arthritis Mother      CANCER Mother      Thyroid     Thyroid Disease Mother      Other Cancer Mother      Colon Cancer No family hx of      Hyperlipidemia No family hx of      Coronary Artery Disease No family hx of      CEREBROVASCULAR DISEASE No family hx of      Breast Cancer No family hx of      Prostate Cancer No family hx of           Immunizations:     Immunization History   Administered Date(s) Administered     Hepatitis B 04/14/2016     Influenza (IIV3) 11/22/2002, 11/12/2003, 10/08/2004, 10/10/2005, 11/21/2006, 09/17/2009, 02/07/2014     Influenza Vaccine IM 3yrs+ 4 Valent IIV4 10/05/2015, 10/17/2016     Mantoux 03/01/2004     Pneumococcal (PCV 13) 12/20/2016     Pneumococcal 23 valent 11/21/2006, 10/05/2015     TD (ADULT, 7+) 02/17/2004     TDAP (BOOSTRIX AGES 10-64) 09/07/2012            Allergies:      Allergies   Allergen Reactions     Erythromycin GI Disturbance     Vioxx      Nausea, vomiting            Medications:     Prior to Admission Medications   Prescriptions Last Dose Informant Patient Reported? Taking?   LISINOPRIL PO   Yes No   Sig: Take by mouth daily   cyclobenzaprine (FLEXERIL) 10 MG tablet   No No   Sig: Take 1 tablet (10 mg) by mouth 3 times daily as needed for muscle spasms   furosemide (LASIX) 40 MG tablet   No No   Sig: Take 1 tablet (40 mg) by mouth daily   gabapentin (NEURONTIN) 300 MG capsule   No No   Sig: Take 2 capsules (600 mg) by mouth At Bedtime   glucagon 1 MG SOLR injection   No No   Sig: Inject 1 mg Subcutaneous every 15 minutes as needed for low blood sugar (May repeat x 1 only)   insulin glargine (LANTUS) 100 UNIT/ML injection   No No   Sig: Inject 65 Units Subcutaneous every morning   insulin lispro (HUMALOG) 100 UNIT/ML injection   No No   Sig: Inject 20 Units Subcutaneous 3 times daily (before meals)    lactobacillus rhamnosus, GG, (CULTURELL) capsule   No No   Sig: Take 1 capsule by mouth 2 times daily   omeprazole (PRILOSEC) 20 MG capsule   No No   Sig: Take 1 capsule (20 mg) by mouth 2 times daily (before meals)   ondansetron (ZOFRAN-ODT) 4 MG ODT tab   No No   Sig: Take 1 tablet (4 mg) by mouth every 8 hours as needed for nausea   rifaximin (XIFAXAN) 550 MG TABS tablet   No No   Sig: Take 1 tablet (550 mg) by mouth 2 times daily   spironolactone (ALDACTONE) 50 MG tablet   No No   Sig: Take 1 tablet (50 mg) by mouth daily      Facility-Administered Medications: None             Review of Systems:     ROS unable to be completed since patient is not cooperative. He denied any abdominal pain but complained of being cold.         Physical Exam:   Blood pressure 135/60, pulse 70, temperature 97.1  F (36.2  C), temperature source Axillary, resp. rate 11, SpO2 97 %.    General: Pt lying in bed. Confused, mildly agitated, refusing exam  HEENT: NCAT, EOMI  Neck: unable to assess at this time  Chest/Resp: unable to assess at this time  Heart/CV: unable to assess at this time  Abdomen/GI: Soft; diffusely tender TTP, non-distended, prominent splenomegaly  /Rectal: Not examined.   MSK: No kyphosis/lordosis/spinous process tenderness/limitations in ROM.   Skin: Jaundiced, No rashes or cyanosis, no visible spider angiomata although incomplete exam  Extremities: No edema in bilateral LE; moving all extremities  Neurological: Not answering questions appropriately or refusing to answer questions, strength grossly preserved, unable to assess for asterixis         Data:     DIAGNOSTIC STUDIES  ROUTINE  LABS (Last four results)  CMP  Recent Labs  Lab 02/21/17  1520 02/21/17  1247 02/21/17  1200 02/16/17  0725  02/15/17  1620 02/15/17  1106   * 123* 124* 124*  < >  --  120*   POTASSIUM 6.0* 6.2*  --  4.9  --  5.2 5.5*   CHLORIDE 90* 90*  --  92*  --   --  87*   CO2 21 21  --  23  --   --  23   ANIONGAP 13 11  --  9  --   --   10   * 400*  --  211*  --   --  267*   BUN 44* 46*  --  35*  --   --  31*   CR 1.46* 1.32* 1.34* 0.84  --   --  0.73   GFRESTIMATED 51* 57* 56* >90Non  GFR Calc  --   --  >90Non  GFR Calc   GFRESTBLACK 61 69 68 >90African American GFR Calc  --   --  >90African American GFR Calc   JACOB 8.4* 8.6  --  8.5  --   --  8.6   PROTTOTAL 5.2*  --  5.7* 5.2*  --   --   --    ALBUMIN 3.2*  --  2.9* 2.8*  --  3.0*  --    BILITOTAL 31.8*  --  34.2* 25.4*  --   --   --    ALKPHOS 270*  --  328* 221*  --   --   --    *  --  143* 152*  --   --   --    ALT 87*  --  100* 83*  --   --   --    < > = values in this interval not displayed.  CBC  Recent Labs  Lab 02/21/17  1520 02/21/17  1220 02/16/17  0725   WBC 6.3 9.8 6.4   RBC 3.08* 3.33* 3.31*   HGB 10.4* 11.5* 11.8*   HCT 29.9* 32.1* 32.8*   MCV 97 96 99   MCH 33.8* 34.5* 35.6*   MCHC 34.8 35.8 36.0   RDW 15.6* 14.8 14.9   PLT 36* 55* 42*     INR  Recent Labs  Lab 02/21/17  1520 02/21/17  1200   INR 1.64* 1.50*     CXR 2/21/17  Impression: Left basilar opacity, likely representing aspiration or  pneumonia or atelectasis.         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    # AMS  Unclear etiology, favors hepatic encephalopathy. Decompensation may be 2/2 medication non-adherence or infection. Patient has a history of HE with questionable medication adherence and was recently hospitalized in 12/2016 for similar chief complaint. Alternatively, patient may have decompensated from infection. Potential sources include SBP, UTI, PNA, cellulitis. Unfortunately, therapeutic paracentesis fluid was not sent for lab. A POCUS at bedside showed minimal fluid pocket not amendable for further paracentesis at this time.  Furthermore, UA was not collected prior to antibiotic administration. CXR possible for aspiration PNA.   -Will start broad antibiotics with zosyn in order to cover for possible SBP and aspiration PNA  -Since patient is refusing PO lactulose, lactulose enema per rectal tube  -Albumin 50g x1 for possible SBP (received 50g in infusion clinic earlier); may consider 60g in AM for a total of 160g (Not given for now for concerns of sending patient into pulmonary edema)  -Consider rifaximin when patient able to tolerate PO  -Consider IR paracentesis in AM if unable to find adequate fluid pocket for diagnostic tap (patient receives biweekly paracentesis x5L)    # Hyperkalemia  Unclear etiology, may be medication induced. Patient was on spironolactone and lisinopril at home. Was held during last hospitalization 2/14. Unclear if patient was still taking this on discharge. May need to clarify with family in AM. EKG without interval or T wave changes concerning for hyperkalemia. Received 10u regular insulin in ED with dextrose.  -Will order additional 10u regular insulin; unable to tolerate PO Kayexalate currently     # EJ   Baseline 0.84; currently 1.46. Unclear etiology. May be prerenal. Patient has large volume paracentesis weekly. May also consider hepatorenal syndrome. UA pending. Patient will likely benefit from albumin. May consider further workup including FEurea if EJ does not improve.     # Hyponatremia  Patient has chronic hyponatremia with baseline in 130s. Now in the 120s. This is new. Volume status difficulty to gauge due to patient non-compliance on exam. Patient has presented on 2/14 with hypervolemic hyponatremia and bettered with fluid restriction. Since patient is not taking anything PO, I will give patient a maintenance IVF NS for 500mL. Will need to readdress in AM. If patient's sodium corrects with gentle IVF bolus, then consider hypovolemic hyponatremia, etiology likely diarrhea vs overaggressive  diuresis.      # Decompensated liver failure  # Cirrhosis, CASTAÑEDA  MELD score 33. On transplant list. Treatment per above for AMS. Consider vitamin K in AM for INR 1.64. Continue to monitor.    CHRONIC MEDICAL PROBLEMS  # IDDM  Start Glargine at 20u qAM while patient is NPO     CODE: FULL, will need to clarify with patient when better mentation or family if not (cannot reach family currently)  Diet/IVF: NPO, IVF@100mL/hr for 500mL  DVT ppx: PCD  Disposition/Admission Status: Patient admitted to inpatient pending clinical improvement and workup of AMS.    This patient was discussed with Dr. Archer, who agrees with the assessment and plan.    Physician Attestation   I, Krishna Archer, saw this patient with the resident and agree with the resident s findings and plan of care as documented in the resident s note.      I personally reviewed vital signs, medications, labs and imaging.      Krishna Archer  Date of Service (when I saw the patient): 2/21/17

## 2017-02-22 NOTE — CONSULTS
Hepatology Consultation    Camacho Bhagat   MRN# [id number]     Age: 51 year old YOB: 1965       Referring provider: Dr. Saldana  Attending Hepatologist: Dr. Bueno  Consult requested for: Hepatic encephalopathy       Assessment and Recommendation:   Assessment:   Mr. Bhagat is a 51-year-old with a history of CASTAÑEDA cirrhosis, HCC s/p TACE/RFA, SBP, ascites, HE, HLD, HTN, and DM II who was admitted with evidence of HE. He is listed for liver transplant but currently on hold due to need for cardiology clearance. He has had mild improvement in his mentation and has been started on empiric treatment of SBP. His sodium level has improved with hydration.       Recommendations:   1. Hepatic encephalopathy  - continue lactulose, titrating for 1-1.5 liters of stool  - rifaximin 550 mg BID  - infectious work up pending, started on zosyn for empiric treatment.     2. Ascites  3. SBP hx  - no current ascites to perform para. Sample from 2/15 negative for SBP  - will need SBP ppx after treatment with zosyn    4. CASTAÑEDA cirrhosis  5. HCC  6. Transplant candidacy  - listed for transplant. Current MELD 30. Will need clearance from cardiology prior to activation. Please consult cardiology for elevated RVSP 36 in setting of transplant.   - Last MRI with treatment area    7. Hyponatremia  - If sodium level remains stable, he should be between 1.5-2 liter fluid restriction daily. Discussed not drinking gatorade due to large sodium content.     Plan of care discussed with Dr. Bueno.     Thank you for the opportunity to be involved in Camacho Bhagat care. Please call with any questions or concerns.     Abigail Robison, APRN, CNP  [phone]               History of Present Illness:   Camacho Bhagat is a 51 year old male with a history of CASTAÑEDA cirrhosis. His liver disease is complicated by HCC s/p TACE x2, RFA x1 with last intervention 9/2016, RONNIE, ascites, HE, SBP, DM II, HLD, fibromyalgia and DVT s/p filter 11/2001. He was  admitted after his paracentesis due to not being as clear in his mentation concerning for HE. His mother reports that he may have missed his evening dose of lactulose the night prior and did not take his am dose. On admit, he did have a sodium level of 124.     Mr. Bhagat was unable to provide a reliable history to recent events and HPI obtained from wife and prior chart notes. He denies any abdominal pain, lower extremity edema, fevers, melena or hematochezia. It is unclear how many BM's he has been having over the past week. His wife notes that his blood sugars have been elevated and he has not been adhering to a fluid restriction. He has been drinking juices and Gatorade for hydration. He denies chest pain, palpations, dyspnea or syncope.              Past Medical History:     Past Medical History   Diagnosis Date     Acute venous embolism and thrombosis of other specified veins 11/01     Deep vein thrombophlebitis, filter placed     Cancer (H)      Depressive disorder, not elsewhere classified      Esophageal reflux      Fibromyalgia 1/2009     dx with Dr Benitez( Rheum)     Osteoarthritis      Other and unspecified hyperlipidemia      Other chronic nonalcoholic liver disease      Fatty liver      Other testicular hypofunction      Pneumonia, organism unspecified 10-01     Included ARDS, sepsis, and  acute renal failure; hospitalized     Rheumatoid arthritis(714.0)      Type II or unspecified type diabetes mellitus without mention of complication, not stated as uncontrolled 11/01     Managed by endocrinology     Unspecified essential hypertension 11/01     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     Unspecified sleep apnea      Uses BiPAP             Past Surgical History:     Past Surgical History   Procedure Laterality Date     C nonspecific procedure       tracheostomy     C nonspecific procedure       repair of deviated septum     C nonspecific procedure  2007     Rt knee arthroscopy     C total knee  arthroplasty  2008     Right knee arthroscopy     Cholecystectomy       Esophagoscopy, gastroscopy, duodenoscopy (egd), combined N/A 8/4/2016     Procedure: COMBINED ESOPHAGOSCOPY, GASTROSCOPY, DUODENOSCOPY (EGD), BIOPSY SINGLE OR MULTIPLE;  Surgeon: Trent Pederson MD;  Location:  GI              Social History:     Social History   Substance Use Topics     Smoking status: Former Smoker     Smokeless tobacco: Former User     Types: Chew     Quit date: 10/8/2015      Comment: Has used chewing tobacco since age 15 , chewed for 20yrs      Alcohol use No      Comment: last drink about a year ago (quit 2001)      Social History   Substance Use Topics     Smoking status: Former Smoker     Smokeless tobacco: Former User     Types: Chew     Quit date: 10/8/2015      Comment: Has used chewing tobacco since age 15 , chewed for 20yrs      Alcohol use No      Comment: last drink about a year ago (quit 2001)           Family History:   The family history includes Arthritis in his mother; CANCER in his mother; DIABETES in his father; Hypertension in his father; Other Cancer in his mother; Substance Abuse in his father; Thyroid Disease in his mother. There is no history of Colon Cancer, Hyperlipidemia, Coronary Artery Disease, CEREBROVASCULAR DISEASE, Breast Cancer, or Prostate Cancer.             Allergies:     Allergies   Allergen Reactions     Erythromycin GI Disturbance     Vioxx      Nausea, vomiting             Medications:   @  Prescriptions Prior to Admission   Medication Sig Dispense Refill Last Dose     LISINOPRIL PO Take by mouth daily        insulin glargine (LANTUS) 100 UNIT/ML injection Inject 65 Units Subcutaneous every morning 19.5 mL 0      insulin lispro (HUMALOG) 100 UNIT/ML injection Inject 20 Units Subcutaneous 3 times daily (before meals) 1 vial 0      furosemide (LASIX) 40 MG tablet Take 1 tablet (40 mg) by mouth daily 30 tablet 0      spironolactone (ALDACTONE) 50 MG tablet Take 1 tablet (50 mg) by  mouth daily 90 tablet 3 Past Week at Unknown time     rifaximin (XIFAXAN) 550 MG TABS tablet Take 1 tablet (550 mg) by mouth 2 times daily 60 tablet 11 Past Week at Unknown time     ondansetron (ZOFRAN-ODT) 4 MG ODT tab Take 1 tablet (4 mg) by mouth every 8 hours as needed for nausea 30 tablet 0 Taking     glucagon 1 MG SOLR injection Inject 1 mg Subcutaneous every 15 minutes as needed for low blood sugar (May repeat x 1 only) 1 each 0 Taking     lactobacillus rhamnosus, GG, (CULTURELL) capsule Take 1 capsule by mouth 2 times daily 60 capsule 0 2/14/2017 at x1     cyclobenzaprine (FLEXERIL) 10 MG tablet Take 1 tablet (10 mg) by mouth 3 times daily as needed for muscle spasms 30 tablet 0 Taking     gabapentin (NEURONTIN) 300 MG capsule Take 2 capsules (600 mg) by mouth At Bedtime 60 capsule 0 2/13/2017 at Unknown time     omeprazole (PRILOSEC) 20 MG capsule Take 1 capsule (20 mg) by mouth 2 times daily (before meals) 60 capsule 0 2/14/2017 at x1   @          Review of Systems:    ROS: 10 point ROS neg other than the symptoms noted above in the HPI.          Physical Exam:   Blood pressure 144/67, pulse 81, temperature 98  F (36.7  C), resp. rate 18, weight 103.9 kg (229 lb), SpO2 98 %.    Intake/Output Summary (Last 24 hours) at 02/22/17 1731  Last data filed at 02/22/17 1600   Gross per 24 hour   Intake              940 ml   Output             3350 ml   Net            -2410 ml     General: In no acute distress, mild facial muscle wasting  Neuro: AOx3, moderate asterixis. Difficulty answering date questions  HEENT: PERRL mild scleral icterus  CV: S1/S2with gr 2/4 holosystolic murmurs, Skin warm and dry  Lungs: clear to auscultation Respirations even and nonlabored on room air  Abd: mildly distended, soft, nontender, +BS  Extrem: No peripehral edema  Skin: mild jaundice         Data:     Lab Results   Component Value Date    WBC 5.1 02/22/2017     Lab Results   Component Value Date    RBC 3.03 02/22/2017     Lab  Results   Component Value Date    HGB 10.3 02/22/2017     Lab Results   Component Value Date    HCT 29.5 02/22/2017     No components found for: MCT  Lab Results   Component Value Date    MCV 97 02/22/2017     Lab Results   Component Value Date    MCH 34.0 02/22/2017     Lab Results   Component Value Date    MCHC 34.9 02/22/2017     Lab Results   Component Value Date    RDW 15.6 02/22/2017     Lab Results   Component Value Date    PLT 37 02/22/2017       Last Basic Metabolic Panel:  Lab Results   Component Value Date     02/22/2017      Lab Results   Component Value Date    POTASSIUM 4.8 02/22/2017     Lab Results   Component Value Date    CHLORIDE 100 02/22/2017     Lab Results   Component Value Date    JACOB 9.0 02/22/2017     Lab Results   Component Value Date    CO2 22 02/22/2017     Lab Results   Component Value Date    BUN 46 02/22/2017     Lab Results   Component Value Date    CR 1.40 02/22/2017     Lab Results   Component Value Date     02/22/2017       Liver Function Studies -   Recent Labs   Lab Test  02/22/17   0643   PROTTOTAL  5.4*   ALBUMIN  3.3*   BILITOTAL  31.1*   ALKPHOS  211*   AST  130*   ALT  77*       Lab Results   Component Value Date    INR 1.67 02/22/2017       MELD-Na score: 30 at 2/22/2017  6:43 AM  MELD score: 29 at 2/22/2017  6:43 AM  Calculated from:  Serum Creatinine: 1.40 mg/dL at 2/22/2017  6:43 AM  Serum Sodium: 133 mmol/L at 2/22/2017  6:43 AM  Total Bilirubin: 31.1 mg/dL at 2/22/2017  6:43 AM  INR(ratio): 1.67 at 2/22/2017  6:43 AM  Age: 51 years    IMAGING:  MRI w/wo 1/6/17  IMPRESSION:  1. Cirrhotic appearance of the liver with evidence of portal  hypertension including splenomegaly and small-to-moderate ascites.  2. Hepatic segment 3 previously treated lesion. The overall treatment  zone size is decreased, though the degree of enhancing soft tissue  within the treatment zone is not substantially changed. This low-level  enhancing tissue continues to remain concerning  for malignancy and  attention on short-term follow-up is needed. OPTN 5T.  3. Based on this exam alone, the patient is within Jorge Alberto criteria.    EGD 8/4/16  Impression:               - Grade I esophageal varices.                             - Erythematous mucosa in the stomach. Biopsied.                             - Normal examined duodenum.     DSE 2/8/17  Interpretation Summary  Normal dobutamine stress echocardiogram without evidence of inducible  ischemia. Target heart rate was achieved. Heart rate and blood pressure  response to dobutamine were normal. With stress, the left ventricular ejection  fraction increased from 55-60% to greater than 65% and the left ventricular  size decreased appropriately.  No subjective symptoms to suggest ischemia.  There was no ECG evidence of ischemia.  No significant valve disease on screening doppler evaluation. The aortic root  and visualized ascending aorta are normal.  The right ventricular systolic pressure is borderline elevated and  approximated at 36mmHg plus the right atrial pressure, though the TR jet is  sub-optimal.

## 2017-02-22 NOTE — PLAN OF CARE
Problem: Goal Outcome Summary  Goal: Goal Outcome Summary  Outcome: Improving  Neuro: A&O X1 and Uncooperative with following commands earlier in the shift. More cooperative between 0500 and 0700. Improvement with mentation.  Cardiac: SR. HR 70's- 80's. -140's.  Respiratory: Sating  In the 90's on RA.  GI/: Adequate urine output. Rectal tube BM X0  Diet/appetite: NPO  Activity:  Repositions independently.  Pain: Generalized. Managed with repositioning and empathetic listening.   Skin: Intact, jaundice     Gave Lactulose enema x 1 this shift. Notable improvement in pt's mentation. Potassium within normal limits. Blood sugars in the 140's. No further concerns. Continue with POC. Notify primary team with changes

## 2017-02-22 NOTE — PLAN OF CARE
Problem: Goal Outcome Summary  Goal: Goal Outcome Summary  Outcome: No Change  Pt arrived to 6B from ED at 19:45; report taken from WOJCIECH Diallo. Pt is disoriented x3; although can verbalize his name. Pt agitated and resistant with cares; unable to perform a thorough neuro check. VSS; afebrile. Clear lung sounds bilaterally; NSR 70s - 80s; room air sats >96%. Rounded abdomen; hypoactive. 5.2L removed via para (per report). RLQ primapore C/D/I. Resistant to oral lactulose. Rectal tube initiated; lactulose enema given x1.Little results. Stage II/III on hepatic encephalopathy scale. Pt resistant; and pulling/pushing at times; mitts initiated. K 5.9; 10 units of regular insulin given. Pt remains hyperglycemic in high 200s; will continue to assess need for dextrose protocol. Incontinent of urine x1; UA needed. Will continue to monitor.

## 2017-02-22 NOTE — PROGRESS NOTES
[facility] 2  Daily Progress Note    Camacho Bhagat MRN# [id number]   Age: 51 year old YOB: 1965     Date of Admission: 2/21/2017  Date of Service: 02/22/2017    Main Plans for Today   - Lactulose to goal BM 2-3 BMs.  Continue Neuro checks.    - Consider diagnostic paracentesis in the AM.    - Continue Zosyn for now and monitor clinical status.    - Hepatology to evaluate.    - Cardiology consult in the AM - on hold for transplant pending further evaluation per hepatology.      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  Now admitted again due to concern for worsening encephalopathy and infection.      Hepatic Encephalopathy: Likely 2/2 to medication non compliance.  However possible infection as well.  Unable to obtain diagnostic paracentesis on admission due to very limited fluid pocket following therapeutic tap prior to arrival.  Started empirically on Zosyn due to concern for SBP and ?aspiration pneumonia on CXR.  Head CT on admission was negative for any acute intracranial process.      - Lactulose 3 times daily - PO.    - PRN Lactulose PO vs enemas per mental status.    - Nursing to continue HE protocol.      Cryptogenic cirrhosis/CASTAÑEDA  HCC S/P TACE 09/2016:    Recurrent Ascites/SBP:   Unclear if this is an infectious process vs. Non compliance as listed above.    - Continue Zosyn today for SBP vs ?aspiration pneumonia.  Procal elevated to 1.7.    - Possible diagnostic para in the AM if there is a more amenable fluid pocket.    - Remainder of infectious work up. UA no infectious signs.  CXR ? Aspiration pneumonia - however no symptoms today.  Blood cultures with no growth to date.    - Continue Rifaximin 550 mg BID.    - Hold diuretics.    - Hepatology consulted -  jasen alfaro.      MELD-Na score: 30 at 2/22/2017  6:43 AM  MELD score: 29 at 2/22/2017  6:43 AM  Calculated from:  Serum Creatinine: 1.40 mg/dL at 2/22/2017  6:43 AM  Serum Sodium: 133 mmol/L at 2/22/2017  6:43 AM  Total Bilirubin: 31.1 mg/dL at 2/22/2017  6:43 AM  INR(ratio): 1.67 at 2/22/2017  6:43 AM  Age: 51 years    Acute Kidney Injury:   Hyponatremia:   Hyperkalemia:    : Pt with recurrent issues with hyperkalemia - thought to be related to his ACEI and Aldactone - however he has been discontinued on this medication on recent discharges. K was 6 on admission - now improved to 4.8.  Hyponatremia is also improved today to 133.    - EJ likely 2/2 to intravascular volume depletion.  6.1 liter paracentesis on 2/21.    - Cr now improving to 1.4 following 100 g of Albumin.    - Give 100 g in the AM to continue SBP treatment.    - Strict I/Os.  Daily weights.      Type II DM:  SSI.  Lantus started at 20 units. Increased to home dose of 60 tomorrow AM with improved PO intake.  Hypoglycemia i  ------------------------------------------------------------------------------------------------------------------  Prophylaxis:     -GI: PPI    -DVT: None due to bleeding risk.      FEN: Regular diet - he would like to meet with a dietician to eval for possible dietary sources for hyperkalemia.    Consults: Hepatology, dietician. .    Family: Wife updated at bedside.    Code status: Full code.   Disposition:   =========================================================  Patient was discussed and evaluated with Dr. Wade.       Yoan Martin   Internal Medicine PGY2      Interval History    Remained somewhat disoriented this morning but more alert.  Denied any complaints and did not recall the circumstances for the admission.  He is frustrated with the recurrent admission and issues with his confusion, high potassium. Would like to review his meds with a pharmacist.  Otherwise no fever, chills, and stable vitals  signs.      Medications   Current Facility-Administered Medications   Medication Dose Route Frequency     lactulose  10 g Oral TID     rifaximin  550 mg Oral BID     lactulose  20 g Oral Once     dextrose  25 g Intravenous Once     piperacillin-tazobactam  4.5 g Intravenous Q6H     vitamin  B-1  100 mg Oral Daily     folic acid  1 mg Oral Daily     insulin glargine  20 Units Subcutaneous QAM AC     insulin aspart  1-7 Units Subcutaneous TID AC     insulin aspart  1-5 Units Subcutaneous At Bedtime     dextrose  25 g Intravenous Once     Current Facility-Administered Medications   Medication Last Rate     - MEDICATION INSTRUCTIONS -       Current Facility-Administered Medications   Medication Dose Route Frequency     - MEDICATION INSTRUCTIONS -   Does not apply Continuous PRN     lactulose  20 g Oral or NG Tube Q2H PRN    Or     lactulose  100 g Rectal Q2H PRN     naloxone  0.1-0.4 mg Intravenous Q2 Min PRN     glucose  15-30 g Oral Q15 Min PRN    Or     dextrose  25-50 mL Intravenous Q15 Min PRN    Or     glucagon  1 mg Subcutaneous Q15 Min PRN       Physical Exam   /68 (BP Location: Right arm)  Pulse 81  Temp 97.9  F (36.6  C) (Oral)  Resp 18  Wt 103.9 kg (229 lb)  SpO2 98%  BMI 31.06 kg/m2    Wt Readings from Last 1 Encounters:   02/22/17 103.9 kg (229 lb)    Body mass index is 31.06 kg/(m^2).     General: Alert, awake, and oriented x 2.  No acute distress, resting comfortably in hospital bed.    HEENT: Scleral icterus.  EOMI.  PERRL.    Neck/Thyroid:  supple  Lungs: CTAB, no wheezes or rhonchi.    Heart:  RRR, no murmurs, rubs or gallops.    Abdomen: Soft, non tender.  NA bowel sounds.     /Rectal:  Rectal tube in place.    MSK:  normal muscle bulk and tone  Skin:  warm and dry, no rashes  Extremities: No significant lower extremity edema.    Neurological: No focal neurologic deficits.  Strength and sensation 5/5 in both upper and lower extremities.  Sensation is grossly intact.  No asterixis.       Intake/Output Summary (Last 24 hours) at 02/22/17 1434  Last data filed at 02/22/17 1200   Gross per 24 hour   Intake              940 ml   Output             2825 ml   Net            -1885 ml     Data   Labs & Studies of Note: I personally reviewed the following studies:    CMP  Recent Labs  Lab 02/22/17  0643 02/22/17  0014 02/21/17  1844 02/21/17  1520 02/21/17  1247 02/21/17  1200 02/16/17  0725    129* 126* 124* 123* 124* 124*   POTASSIUM 4.8 5.1 5.9* 6.0* 6.2*  --  4.9   CHLORIDE 100  --  94 90* 90*  --  92*   CO2 22  --  21 21 21  --  23   ANIONGAP 12  --  12 13 11  --  9   *  --  384* 410* 400*  --  211*   BUN 46*  --  50* 44* 46*  --  35*   CR 1.40*  --  1.64* 1.46* 1.32* 1.34* 0.84   GFRESTIMATED 53*  --  44* 51* 57* 56* >90Non  GFR Calc   GFRESTBLACK 64  --  54* 61 69 68 >90African American GFR Calc   JACOB 9.0  --  8.6 8.4* 8.6  --  8.5   MAG  --   --  2.6*  --   --   --   --    PROTTOTAL 5.4*  --   --  5.2*  --  5.7* 5.2*   ALBUMIN 3.3*  --   --  3.2*  --  2.9* 2.8*   BILITOTAL 31.1*  --   --  31.8*  --  34.2* 25.4*   ALKPHOS 211*  --   --  270*  --  328* 221*   *  --   --  120*  --  143* 152*   ALT 77*  --   --  87*  --  100* 83*     CBC  Recent Labs  Lab 02/22/17  0643 02/21/17  1844 02/21/17  1520 02/21/17  1220   WBC 5.1 6.2 6.3 9.8   RBC 3.03* 3.11* 3.08* 3.33*   HGB 10.3* 10.9* 10.4* 11.5*   HCT 29.5* 30.1* 29.9* 32.1*   MCV 97 97 97 96   MCH 34.0* 35.0* 33.8* 34.5*   MCHC 34.9 36.2 34.8 35.8   RDW 15.6* 15.8* 15.6* 14.8   PLT 37* 38* 36* 55*     INR  Recent Labs  Lab 02/22/17  0643 02/21/17  1520 02/21/17  1200   INR 1.67* 1.64* 1.50*     Lactic Acid   Recent Labs  Lab 02/22/17  0643 02/22/17  0014 02/21/17  1844   LACT 1.9 2.0 2.5*       Inflammatory Markers  Recent Labs   Lab Test  11/23/16   0813  11/16/16   0706  11/15/16   1039  11/07/16   0759  11/03/16   0949  11/01/16   0853  10/29/16   0811  10/27/16   1110  08/15/11   1151  04/11/11   1209   01/03/11   1246  02/15/10   1232   SED  45*   --    --    --    --    --    --    --   11  14  17*  25*   CRP  58.0*  59.1*  49.8*  66.0*  140.0*  150.0*  170.0*  150.0*  11.1*  9.0*  11.7*  17.5*       Microbiology:    Culture Micro   Date Value Ref Range Status   02/21/2017 No growth after 17 hours  Final   02/21/2017 No growth after 18 hours  Final   12/10/2016 No growth  Final   12/09/2016 No growth  Final   11/23/2016 No growth  Final   11/23/2016 No anaerobes isolated  Final   11/23/2016 No growth  Final   11/22/2016 No growth  Final   11/22/2016 No growth  Final   11/22/2016 >100,000 colonies/mL Citrobacter koseri (A)  Final   11/18/2016 No growth  Final   11/18/2016 No anaerobes isolated  Final   06/11/2009 No growth  Final   02/18/2005 No growth  Final   02/17/2005   Final    Test canceled - Lab  error Charge credited     Recent Labs   Lab Test  02/21/17   1918  02/21/17   1844  12/10/16   1540   CULT  No growth after 17 hours  No growth after 18 hours  No growth   SDES  Blood Right Arm  Blood Right Hand  Ascites  Ascites       Urine Studies:    Recent Labs   Lab Test  02/22/17   0034  02/16/17   1005  12/12/16   0150   LEUKEST  Negative  Trace*  Trace*   NITRITE  Negative  Negative  Negative   WBCU  5*  <1  0   RBCU  1  1  >182*     Imaging:   Recent Results (from the past 24 hour(s))   XR Chest Port 1 View    Narrative    Exam: XR CHEST PORT 1 VW, 2/21/2017 3:40 PM    Indication: altered mental status    Comparison: Chest x-ray 2/15/2017    Findings:   Frontal projection radiograph of the chest. Left costophrenic angle is  not included. Low lung volume. Left basilar opacity. Cardiac  silhouette is unchanged. Visualized upper abdominal osseous structures  are unremarkable. No pleural effusion or pneumothorax.      Impression    Impression: Left basilar opacity, likely representing aspiration or  pneumonia or atelectasis.    I have personally reviewed the examination and initial  interpretation  and I agree with the findings.    DAMIEN LIRA MD   CT Head w/o Contrast    Narrative    CT HEAD W/O CONTRAST 2/21/2017 3:58 PM    Provided History: headache/LOC change    Comparison: Head CT 12/9/2016.    Technique: Using multidetector thin collimation helical acquisition  technique, axial, coronal and sagittal CT images from the skull base  to the vertex were obtained without intravenous contrast.     Findings:    No intracranial hemorrhage, mass effect, or midline shift. The  ventricles are proportionate to the cerebral sulci. The gray to white  matter differentiation of the cerebral hemispheres is preserved. The  basal cisterns are patent.    The visualized paranasal sinuses are clear. The mastoid air cells are  clear.       Impression    Impression: No acute intracranial pathology. No change since  12/9/2016.    HAYDEN MUIR MD

## 2017-02-22 NOTE — PROGRESS NOTES
ED Boarding Patient Plan of Care    Admitting service: [facility]  For all critical changes in vital sign or critical lab values please notify the ED Physician immediately.     For non-critical changes or any other patient care questions, please contact the admitting service at:   From 8 PM to 7 AM contact number is [phone]  From 7 AM  contact number is Hilary Aishwarya - see sticky note.      While the patient remains in the ED please follow all active orders written by the ED providers or by the Medicine Team.  Do not release the sign and held orders until the patient arrives on the floor.    Once a bed is available please send text message to IP team to notify them that the patient is moving to the floor.    Yoan Martin MD  Pager: [phone]

## 2017-02-22 NOTE — PLAN OF CARE
Problem: Goal Outcome Summary  Goal: Goal Outcome Summary  Outcome: Improving  /56 (BP Location: Right arm)  Pulse 81  Temp 97.9  F (36.6  C) (Oral)  Resp 18  Wt 103.9 kg (229 lb)  SpO2 98%  BMI 31.06 kg/m2     Alert, oriented x4 but forgetful. Sinus rhythm. Room air with sats in the mid 90s. Tolerating regular diet. Crowe placed, adequate icteric urine output. Rectal tube in place. Minimal liquid stool output. Lactulose changed to scheduled TID with PRN dosing. Denies pain. Emotional this AM when mentation improved. Wife at bedside, supportive. Resting this afternoon. Continue current plan of care.

## 2017-02-23 LAB
ALBUMIN FLD-MCNC: 0.3 G/DL
ALBUMIN SERPL-MCNC: 2.9 G/DL (ref 3.4–5)
ALP SERPL-CCNC: 198 U/L (ref 40–150)
ALT SERPL W P-5'-P-CCNC: 91 U/L (ref 0–70)
ANION GAP SERPL CALCULATED.3IONS-SCNC: 12 MMOL/L (ref 3–14)
APPEARANCE FLD: NORMAL
AST SERPL W P-5'-P-CCNC: 146 U/L (ref 0–45)
BILIRUB SERPL-MCNC: 32.7 MG/DL (ref 0.2–1.3)
BUN SERPL-MCNC: 42 MG/DL (ref 7–30)
CALCIUM SERPL-MCNC: 9 MG/DL (ref 8.5–10.1)
CHLORIDE SERPL-SCNC: 96 MMOL/L (ref 94–109)
CO2 SERPL-SCNC: 21 MMOL/L (ref 20–32)
COLOR FLD: YELLOW
CREAT SERPL-MCNC: 1.29 MG/DL (ref 0.66–1.25)
ERYTHROCYTE [DISTWIDTH] IN BLOOD BY AUTOMATED COUNT: 15.8 % (ref 10–15)
GFR SERPL CREATININE-BSD FRML MDRD: 58 ML/MIN/1.7M2
GLUCOSE BLDC GLUCOMTR-MCNC: 266 MG/DL (ref 70–99)
GLUCOSE BLDC GLUCOMTR-MCNC: 270 MG/DL (ref 70–99)
GLUCOSE BLDC GLUCOMTR-MCNC: 327 MG/DL (ref 70–99)
GLUCOSE BLDC GLUCOMTR-MCNC: 379 MG/DL (ref 70–99)
GLUCOSE BLDC GLUCOMTR-MCNC: 379 MG/DL (ref 70–99)
GLUCOSE BLDC GLUCOMTR-MCNC: 388 MG/DL (ref 70–99)
GLUCOSE BLDC GLUCOMTR-MCNC: 546 MG/DL (ref 70–99)
GLUCOSE SERPL-MCNC: 285 MG/DL (ref 70–99)
GRAM STN SPEC: NORMAL
HCT VFR BLD AUTO: 30.1 % (ref 40–53)
HGB BLD-MCNC: 10.7 G/DL (ref 13.3–17.7)
INR PPP: 1.67 (ref 0.86–1.14)
LYMPHOCYTES NFR FLD MANUAL: 73 %
MCH RBC QN AUTO: 34.6 PG (ref 26.5–33)
MCHC RBC AUTO-ENTMCNC: 35.5 G/DL (ref 31.5–36.5)
MCV RBC AUTO: 97 FL (ref 78–100)
MICRO REPORT STATUS: NORMAL
NEUTS BAND NFR FLD MANUAL: 5 %
OTHER CELLS FLD MANUAL: 22 %
PLATELET # BLD AUTO: 39 10E9/L (ref 150–450)
POTASSIUM SERPL-SCNC: 5.3 MMOL/L (ref 3.4–5.3)
POTASSIUM SERPL-SCNC: 5.6 MMOL/L (ref 3.4–5.3)
PROT FLD-MCNC: 0.7 G/DL
PROT SERPL-MCNC: 5 G/DL (ref 6.8–8.8)
RBC # BLD AUTO: 3.09 10E12/L (ref 4.4–5.9)
RBC # FLD: NORMAL /UL
SODIUM SERPL-SCNC: 124 MMOL/L (ref 133–144)
SODIUM SERPL-SCNC: 130 MMOL/L (ref 133–144)
SPECIMEN SOURCE FLD: NORMAL
SPECIMEN SOURCE: NORMAL
WBC # BLD AUTO: 5.9 10E9/L (ref 4–11)
WBC # FLD AUTO: 138 /UL

## 2017-02-23 PROCEDURE — 87205 SMEAR GRAM STAIN: CPT | Performed by: STUDENT IN AN ORGANIZED HEALTH CARE EDUCATION/TRAINING PROGRAM

## 2017-02-23 PROCEDURE — 99233 SBSQ HOSP IP/OBS HIGH 50: CPT | Mod: GC | Performed by: PEDIATRICS

## 2017-02-23 PROCEDURE — 89051 BODY FLUID CELL COUNT: CPT | Performed by: STUDENT IN AN ORGANIZED HEALTH CARE EDUCATION/TRAINING PROGRAM

## 2017-02-23 PROCEDURE — 84295 ASSAY OF SERUM SODIUM: CPT | Performed by: STUDENT IN AN ORGANIZED HEALTH CARE EDUCATION/TRAINING PROGRAM

## 2017-02-23 PROCEDURE — 25000132 ZZH RX MED GY IP 250 OP 250 PS 637: Performed by: INTERNAL MEDICINE

## 2017-02-23 PROCEDURE — 0W9G3ZZ DRAINAGE OF PERITONEAL CAVITY, PERCUTANEOUS APPROACH: ICD-10-PCS | Performed by: HOSPITALIST

## 2017-02-23 PROCEDURE — 85610 PROTHROMBIN TIME: CPT | Performed by: INTERNAL MEDICINE

## 2017-02-23 PROCEDURE — 00000146 ZZHCL STATISTIC GLUCOSE BY METER IP

## 2017-02-23 PROCEDURE — 25000128 H RX IP 250 OP 636: Performed by: INTERNAL MEDICINE

## 2017-02-23 PROCEDURE — 49082 ABD PARACENTESIS: CPT | Performed by: HOSPITALIST

## 2017-02-23 PROCEDURE — 80053 COMPREHEN METABOLIC PANEL: CPT | Performed by: INTERNAL MEDICINE

## 2017-02-23 PROCEDURE — 12000006 ZZH R&B IMCU INTERMEDIATE UMMC

## 2017-02-23 PROCEDURE — 36415 COLL VENOUS BLD VENIPUNCTURE: CPT | Performed by: INTERNAL MEDICINE

## 2017-02-23 PROCEDURE — 99222 1ST HOSP IP/OBS MODERATE 55: CPT | Performed by: PHYSICIAN ASSISTANT

## 2017-02-23 PROCEDURE — 84132 ASSAY OF SERUM POTASSIUM: CPT | Performed by: STUDENT IN AN ORGANIZED HEALTH CARE EDUCATION/TRAINING PROGRAM

## 2017-02-23 PROCEDURE — 40000141 ZZH STATISTIC PERIPHERAL IV START W/O US GUIDANCE

## 2017-02-23 PROCEDURE — 82042 OTHER SOURCE ALBUMIN QUAN EA: CPT | Performed by: STUDENT IN AN ORGANIZED HEALTH CARE EDUCATION/TRAINING PROGRAM

## 2017-02-23 PROCEDURE — 87070 CULTURE OTHR SPECIMN AEROBIC: CPT | Performed by: STUDENT IN AN ORGANIZED HEALTH CARE EDUCATION/TRAINING PROGRAM

## 2017-02-23 PROCEDURE — 36415 COLL VENOUS BLD VENIPUNCTURE: CPT | Performed by: STUDENT IN AN ORGANIZED HEALTH CARE EDUCATION/TRAINING PROGRAM

## 2017-02-23 PROCEDURE — 84157 ASSAY OF PROTEIN OTHER: CPT | Performed by: STUDENT IN AN ORGANIZED HEALTH CARE EDUCATION/TRAINING PROGRAM

## 2017-02-23 PROCEDURE — 85027 COMPLETE CBC AUTOMATED: CPT | Performed by: INTERNAL MEDICINE

## 2017-02-23 RX ORDER — ONDANSETRON 2 MG/ML
4 INJECTION INTRAMUSCULAR; INTRAVENOUS ONCE
Status: COMPLETED | OUTPATIENT
Start: 2017-02-23 | End: 2017-02-23

## 2017-02-23 RX ADMIN — LACTULOSE 20 G: 20 SOLUTION ORAL at 06:38

## 2017-02-23 RX ADMIN — INSULIN GLARGINE 20 UNITS: 100 INJECTION, SOLUTION SUBCUTANEOUS at 07:21

## 2017-02-23 RX ADMIN — OMEPRAZOLE 20 MG: 20 CAPSULE, DELAYED RELEASE ORAL at 08:38

## 2017-02-23 RX ADMIN — PIPERACILLIN AND TAZOBACTAM 4.5 G: 4; .5 INJECTION, POWDER, FOR SOLUTION INTRAVENOUS at 00:18

## 2017-02-23 RX ADMIN — ONDANSETRON 4 MG: 2 INJECTION INTRAMUSCULAR; INTRAVENOUS at 03:33

## 2017-02-23 RX ADMIN — LACTULOSE 20 G: 20 SOLUTION ORAL at 12:41

## 2017-02-23 RX ADMIN — RIFAXIMIN 550 MG: 550 TABLET ORAL at 08:38

## 2017-02-23 RX ADMIN — INSULIN ASPART 3 UNITS: 100 INJECTION, SOLUTION INTRAVENOUS; SUBCUTANEOUS at 07:22

## 2017-02-23 RX ADMIN — LACTULOSE 10 G: 20 SOLUTION ORAL at 08:38

## 2017-02-23 RX ADMIN — LACTULOSE 10 G: 20 SOLUTION ORAL at 14:39

## 2017-02-23 RX ADMIN — LACTULOSE 20 G: 20 SOLUTION ORAL at 10:13

## 2017-02-23 RX ADMIN — PIPERACILLIN AND TAZOBACTAM 4.5 G: 4; .5 INJECTION, POWDER, FOR SOLUTION INTRAVENOUS at 12:42

## 2017-02-23 RX ADMIN — LACTULOSE 10 G: 20 SOLUTION ORAL at 19:16

## 2017-02-23 RX ADMIN — RIFAXIMIN 550 MG: 550 TABLET ORAL at 19:16

## 2017-02-23 RX ADMIN — INSULIN ASPART 5 UNITS: 100 INJECTION, SOLUTION INTRAVENOUS; SUBCUTANEOUS at 14:04

## 2017-02-23 RX ADMIN — FOLIC ACID 1 MG: 1 TABLET ORAL at 08:38

## 2017-02-23 RX ADMIN — PIPERACILLIN AND TAZOBACTAM 4.5 G: 4; .5 INJECTION, POWDER, FOR SOLUTION INTRAVENOUS at 06:38

## 2017-02-23 RX ADMIN — LACTULOSE 20 G: 20 SOLUTION ORAL at 03:20

## 2017-02-23 RX ADMIN — Medication 100 MG: at 08:38

## 2017-02-23 RX ADMIN — PIPERACILLIN AND TAZOBACTAM 4.5 G: 4; .5 INJECTION, POWDER, FOR SOLUTION INTRAVENOUS at 17:58

## 2017-02-23 NOTE — PROVIDER NOTIFICATION
MD Notification    Notified Person:  MD Coleman    Notification Date/Time: 4:21 PM    Notification Interaction:  Talked with Physician    Purpose of Notification:     Orders Received: Changing from medium insulin resistance to high insulin resistance dosing    Comments: Will continue current plan of care and update MDs with any changes.

## 2017-02-23 NOTE — PROGRESS NOTES
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 Now admitted again due to concern for worsening encephalopathy and infection.      Hepatic Encephalopathy: Likely 2/2 to medication non compliance. However possible infection as well. Unable to obtain diagnostic paracentesis on admission due to very limited fluid pocket following therapeutic tap prior to arrival. Started empirically on Zosyn due to concern for SBP and ?aspiration pneumonia on CXR. Head CT on admission was negative for any acute intracranial process. Patient with mild improvement in mentation.  - Lactulose 3 times daily - PO. Titrate for 3-5 stools per day.   -Rifaximin 550 mg bid  - PRN Lactulose PO vs enemas per mental status.   - Nursing to continue HE protocol.      Cryptogenic cirrhosis/CASTAÑEDA  HCC S/P TACE 09/2016:   Recurrent Ascites/SBP:   Unclear if this is an infectious process vs. Non compliance as listed above. Continues to have worsening bilirubin.  - Continue Zosyn today for SBP vs ?aspiration pneumonia. Procal elevated to 1.7.   - F/u diagnostic paracentesis results  - Remainder of infectious work up. UA no infectious signs. CXR ? Aspiration pneumonia - however no symptoms today. Blood cultures with no growth to date.   - Continue Rifaximin 550 mg BID.   - Hold diuretics.   - Hepatology consulted - appreciate recs.   -Transplant candidacy- listed for liver transplant, but needs to be cleared by cardiology; will get right heart cath tomorrow to eval elevated RVSP  -MRI abdomen w/wo contrast tomorrow to evaluate for recurrence of HCC     MELD-Na score: 31 at 2/23/2017  6:48 AM  MELD score: 28 at 2/23/2017  6:48 AM  Calculated from:  Serum  Creatinine: 1.29 mg/dL at 2/23/2017  6:48 AM  Serum Sodium: 130 mmol/L at 2/23/2017  6:48 AM  Total Bilirubin: 32.7 mg/dL at 2/23/2017  6:48 AM  INR(ratio): 1.67 at 2/23/2017  6:48 AM  Age: 51 years       Acute Kidney Injury:   Hyponatremia:   Hyperkalemia:   : Pt with recurrent issues with hyperkalemia - thought to be related to his ACEI and Aldactone - however he has been discontinued on this medication on recent discharges. K was 6 on admission - now improved to 5.3. Hyponatremia is also improved today to 130.   - EJ likely 2/2 to intravascular volume depletion. 6.1 liter paracentesis on 2/21.   - Cr now improving to 1.29 following 100 g of Albumin.   - Strict I/Os. Daily weights.      Type II DM: SSI. Lantus started at 20 units on admission (normally on 65 units qam). Patient wasn't eating much this am, continued lantus 20 units, so continued this dose, but throughout the day started to eat more and CBG in 500s.  -Started insulin aspart subq 2 units/15 g carbs  -Changed to high dose ISS  -Hypoglycemia protocol  -Will be NPO at MN, so will continue the 20 units lantus in the am    Elevated RVSP- Right heart cath tomorrow per cards to r/o pulmonatry hypertension  ------------------------------------------------------------------------------------------------------------------  Prophylaxis:   -GI: PPI  -DVT: None due to bleeding risk.      FEN: Mod carbohydrate diet - he would like to meet with a dietician to eval for possible dietary sources for hyperkalemia.   Consults: Hepatology, dietician. .   Family: Mother updated at bedside.   Code status: Full code.   Disposition:   =========================================================  Patient was discussed and evaluated with Dr. Mauro Coleman MD  Med-Derm, PGY-1  Name  [phone]    ==================================================================    24 hr events: Paracentesis today    Subjective:  The patient was more confused overnight and  received 2 extra doses of lactulose. This morning A+Ox3, and appropriately responds to questions. No abdominal pain, F/C/SOB. No N/V. States he had about 4 BMs since last night.    Objective:  Most recent vital signs:  /68 (BP Location: Left arm)  Pulse 84  Temp 98.4  F (36.9  C) (Oral)  Resp 18  Wt 99.6 kg (219 lb 8 oz)  SpO2 96%  BMI 29.77 kg/m2  Temp:  [97.7  F (36.5  C)-98.5  F (36.9  C)] 98.4  F (36.9  C)  Pulse:  [84] 84  Heart Rate:  [73-83] 83  Resp:  [16-18] 18  BP: (133-153)/(58-72) 152/68  SpO2:  [96 %-99 %] 96 %  Wt Readings from Last 2 Encounters:   02/23/17 99.6 kg (219 lb 8 oz)   02/21/17 107.4 kg (236 lb 12.8 oz)       Intake/Output Summary (Last 24 hours) at 02/23/17 0806  Last data filed at 02/23/17 0400   Gross per 24 hour   Intake              820 ml   Output             2500 ml   Net            -1680 ml       Physical exam:  General: Alert, awake, and oriented x 2. No acute distress, resting comfortably in hospital bed.   HEENT: Scleral icterus. EOMI. PERRL.   Neck/Thyroid: supple  Lungs: CTAB, no wheezes or rhonchi.   Heart: RRR, no murmurs, rubs or gallops.   Abdomen: Soft, non tender. NA bowel sounds.    /Rectal: Rectal tube in place.   MSK: normal muscle bulk and tone  Skin: warm and dry, no rashes, jaundice  Extremities: No significant lower extremity edema.   Neurological: No focal neurologic deficits. Strength and sensation 5/5 in both upper and lower extremities. Sensation is grossly intact. No asterixis.     Labs (Past three days):  CMP  Recent Labs  Lab 02/23/17  0648 02/22/17  1933 02/22/17  0643 02/22/17  0014 02/21/17  1844 02/21/17  1520  02/21/17  1200   * 126* 133 129* 126* 124*  < > 124*   POTASSIUM 5.3  --  4.8 5.1 5.9* 6.0*  < >  --    CHLORIDE 96  --  100  --  94 90*  < >  --    CO2 21  --  22  --  21 21  < >  --    ANIONGAP 12  --  12  --  12 13  < >  --    *  --  147*  --  384* 410*  < >  --    BUN 42*  --  46*  --  50* 44*  < >  --    CR 1.29*  --   1.40*  --  1.64* 1.46*  < > 1.34*   GFRESTIMATED 58*  --  53*  --  44* 51*  < > 56*   GFRESTBLACK 71  --  64  --  54* 61  < > 68   JACOB 9.0  --  9.0  --  8.6 8.4*  < >  --    MAG  --   --   --   --  2.6*  --   --   --    PROTTOTAL 5.0*  --  5.4*  --   --  5.2*  --  5.7*   ALBUMIN 2.9*  --  3.3*  --   --  3.2*  --  2.9*   BILITOTAL 32.7*  --  31.1*  --   --  31.8*  --  34.2*   ALKPHOS 198*  --  211*  --   --  270*  --  328*   *  --  130*  --   --  120*  --  143*   ALT 91*  --  77*  --   --  87*  --  100*   < > = values in this interval not displayed.  CBC    Recent Labs  Lab 02/23/17  0648 02/22/17  0643 02/21/17  1844 02/21/17  1520   WBC 5.9 5.1 6.2 6.3   RBC 3.09* 3.03* 3.11* 3.08*   HGB 10.7* 10.3* 10.9* 10.4*   HCT 30.1* 29.5* 30.1* 29.9*   MCV 97 97 97 97   MCH 34.6* 34.0* 35.0* 33.8*   MCHC 35.5 34.9 36.2 34.8   RDW 15.8* 15.6* 15.8* 15.6*   PLT 39* 37* 38* 36*     INR    Recent Labs  Lab 02/23/17  0648 02/22/17  0643 02/21/17  1520 02/21/17  1200   INR 1.67* 1.67* 1.64* 1.50*     Arterial Blood GasNo lab results found in last 7 days.

## 2017-02-23 NOTE — PLAN OF CARE
Problem: Goal Outcome Summary  Goal: Goal Outcome Summary  Outcome: No Change  NEURO: Waxes and wanes, usually knows the date and that he is in the hospital but unable to tell which one.  Occasionally has not been able to remember the date.  Otherwise neuros are intact.  2 PRN doses of Lactulose given per [facility] protocol.  TELE:  SR  VITALS: Stable  Temp: 98.1  F (36.7  C) Temp src: Oral BP: 148/64 Pulse: 84 Heart Rate: 83 Resp: 18 SpO2: 97 % O2 Device: None (Room air)     Weight: 99.6 kg (219 lb 8 oz) (standing scale #1)  Estimated body mass index is 29.77 kg/(m^2) as calculated from the following:    Height as of 2/14/17: 1.829 m (6').    Weight as of this encounter: 99.6 kg (219 lb 8 oz).  CV: RRR, BLE slight edema, extremities warm and dry  PULM: LS clear, sats stable on RA.  GI: Had 2 BMs this shift both formed and soft, one large, one small.  Diagnostic para done this afternoon per [facility].  Tolerating a regular diet.    : UOP excellent via vazquez.  SKIN: Small left lower abdomen para site, not leaking noted.  Otherwise intact, jaundiced.  LABS: BGs in the 200-300s.  [facility] team aware.  Platelets 39,000.  INR 1.67.  Bili 32.7.  Cr 1.29.  Na 130.  LDA: PIV  GTT: None  PAIN: Denies  ACTIVITY: Up with 1A to BSC, tires easily.  PLAN: Continue cares on 6B.  Cardiology to see pt to reassess ability to be put back on the transplant list.

## 2017-02-23 NOTE — PROGRESS NOTES
"CLINICAL NUTRITION SERVICES - ASSESSMENT NOTE     Nutrition Prescription    Malnutrition Status:    Criteria not met, but may be at risk    Recommendations already ordered by Registered Dietitian (RD):  Education on low potassium diet provided as explained below.    PRN supplement to try: either Ensure Clear (0mg K+) or Nepro (250 mg K+) TID with meals.    Future/Additional Recommendations:  Follow eating and tolerance. If eating not going well, consider kcal cnts or increase supplementation frequency if supplements help this patient.    Follow BG control. Consider endocrinology involvement if BG persisting >180 mg/dL. Last known A1c was 9.4 (checked 2/16/17).     REASON FOR ASSESSMENT  Camacho Bhagat is a/an 51 year old male assessed by the dietitian for Provider Order - family reports patient takes in excessive potassium containing food/drink. See note from Mom in chart. Thanks!    NUTRITION HISTORY  Obtained from patient. Reports intake has been poor/fair at home due to abdominal fullness and intolerance at times. Pt reports only consuming generic brand PRO drinks (\"equate\"), fruit, and a protein \"snack-pack\" that includes meat, cheese, and nuts.      CURRENT NUTRITION ORDERS  Diet: Regular  Intake/Tolerance: 100% of breakfast tray, which pt reports \"I was able to keep down\".    LABS  Potassium   Date Value Ref Range Status   02/23/2017 5.3 3.4 - 5.3 mmol/L Final     Lab Results   Component Value Date    A1C 9.4 02/16/2017    A1C 6.2 12/14/2016    A1C 6.1 10/27/2016    A1C 8.3 07/02/2012    A1C 8.5 07/31/2009     Blood Glucose: 266-398 mg/dL    MEDICATIONS  Zofran  Insulin  Lactulose TID  Folic Acid 1mg  Thiamine 100 mg    ANTHROPOMETRICS  Height: 0 cm (Data Unavailable) - 6' (2/14/17)  Most Recent Weight: 99.6 kg (219 lb 8 oz) (standing scale #1)    IBW: 80.9 kg  BMI: Overweight BMI 25-29.9  Weight History:   Wt Readings from Last 25 Encounters:   02/23/17 99.6 kg (219 lb 8 oz)   02/21/17 107.4 kg (236 lb 12.8 " oz)   02/17/17 106.5 kg (234 lb 11.2 oz)   02/14/17 103.2 kg (227 lb 9.6 oz)   02/14/17 101.7 kg (224 lb 4.8 oz)   02/10/17 104.1 kg (229 lb 8 oz)   02/07/17 106.2 kg (234 lb 3.2 oz)   02/03/17 106.8 kg (235 lb 8 oz)   01/31/17 107.4 kg (236 lb 12.8 oz)   01/27/17 106.7 kg (235 lb 3.2 oz)   01/24/17 108.1 kg (238 lb 4.8 oz)   01/23/17 113.2 kg (249 lb 9.6 oz)   01/20/17 (P) 109.3 kg (241 lb)   01/18/17 108.1 kg (238 lb 4.8 oz)   01/13/17 97.3 kg (214 lb 6.4 oz)   01/10/17 108.9 kg (240 lb)   01/06/17 108 kg (238 lb)   01/03/17 (P) 107.5 kg (237 lb 1.6 oz)   12/30/16 114.1 kg (251 lb 8 oz)   12/27/16 111.6 kg (246 lb 1.6 oz)   12/27/16 110.6 kg (243 lb 12.8 oz)   12/23/16 110.5 kg (243 lb 8 oz)   12/20/16 120.2 kg (265 lb)   12/17/16 118.5 kg (261 lb 3.9 oz)   12/06/16 121.3 kg (267 lb 6.4 oz)   Variable weight r/t fluid status (ascites)    Dosing Weight: 86 kg (adjust using lowest weigh this admit of 99.6 kg on 2/23)    ASSESSED NUTRITION NEEDS  Estimated Energy Needs: 9332-6652 kcals/day (25 - 30 kcals/kg)  Justification: Maintenance  Estimated Protein Needs: 103-129 grams protein/day (1.2 - 1.5 grams of pro/kg)  Justification: Increased needs with liver disease  Estimated Fluid Needs: Per provider pending fluid status    PHYSICAL FINDINGS  Jaundice  Ascites    MALNUTRITION  % Intake: Decreased intake does not meet criteria  % Weight Loss: Unable to assess with fluid status  Subcutaneous Fat Loss: None observed  Muscle Loss: None observed  Fluid Accumulation/Edema: None noted  Malnutrition Diagnosis: Patient does not meet two of the above criteria necessary for diagnosing malnutrition but is at risk for malnutrition    NUTRITION DIAGNOSIS  Inadequate oral intake related to intolerance to eating (nausea, early satiety) as evidenced by patient report of poor tolerance.       INTERVENTIONS  Implementation  Nutrition Education: Provided education on low potassium diet. Reviewed list of foods high in potassium and low  in potassium. Encouraged patient to find an alternative oral supplement this is low in potassium (ensure clear or Nepro vs generic) given his PRO shakes are likely high in potassium. Reviewed handouts: Controlling Your Potassium Intake and Foods and Drinks Low in Potassium and Phosphorus.      Medical food supplement therapy: as above.    Goals  Patient will verbalize 3 foods high in potassium and 3 foods low in potassium.  Patient to consume % of nutritionally adequate meal trays TID, or the equivalent with supplements/snacks.     Monitoring/Evaluation  Progress toward goals will be monitored and evaluated per protocol.    Toñito Temple RD, LD  6B Clinical Dietitian  Pager: [phone]  [facility] [id number]

## 2017-02-23 NOTE — PROCEDURES
Procedure Note  The risks and benefits of the procedure were explained to the patient, who expressed understanding and opted to proceed.  Consent was obtained and placed in the chart.  A time out was performed.  An area of ascites was located using ultrasound and marked in the left lower quadrant; the area was prepped and draped in the usual sterile fashion.  7 ml of 1% lidocaine was instilled and ascites located.  The paracentesis catheter and needle were inserted until ascites obtained then the needle removed.  The apparatus was connected to vacuum bottles and a total of 40 ml removed.   The catheter was withdrawn and the area dressed.    Patient tolerated the procedure well.    Location: LLQ  Total volume removed: 40 mL (diagnostic only)  Fluid sent to the lab: Yes    Nolan Mackey M.D.  Attending Physician  [facility] Medicine  Bedside Procedures Service  [phone]  DOS:  February 23, 2017

## 2017-02-23 NOTE — PLAN OF CARE
Problem: Goal Outcome Summary  Goal: Goal Outcome Summary  Outcome: No Change  Patient is A&Ox4 - forgetful. Vitally stable on RA. Abdominal cramping - MDs aware. Lactulose given this shift x1 PRN. Rectal tube removed this afternoon, now having loose stools per commode. Crowe patent with adequate output. Up with 1 assist. Will continue current plan of care and update MDs with any changes.

## 2017-02-23 NOTE — PLAN OF CARE
Problem: Goal Outcome Summary  Goal: Goal Outcome Summary  Outcome: No Change  /71 (BP Location: Right arm)  Pulse 81  Temp 98.3  F (36.8  C) (Oral)  Resp 18  Wt 99.6 kg (219 lb 8 oz)  SpO2 99%  BMI 29.77 kg/m2     Neuro: A&Ox 3-4. Occasionally disoriented to situation and makes confused statements. Stage I encephalopathy @ 0300- Lactulose given.   Cardiac: SR. VSS. Afebrile.    Respiratory: Sating mid 90s on home CPAP overnight.   GI/: Adequate urine output via vazquez. Loose BM X1. C/o nausea X1. Gave one time dose of zofran.   Diet/appetite: Tolerating regular diet.   Activity:  Assist of 1 to commode.   Pain: At acceptable level on current regimen; no complaints overnight.   Skin: Intact, no new deficits noted.     R: Continue with POC. Notify primary team with changes.

## 2017-02-23 NOTE — PROGRESS NOTES
[facility]  HEPATOLOGY PROGRESS NOTE  Camacho Bhagat [id number]     SUBJECTIVE:  Continues to be mildly confused but able to respond more clearly than yesterday. Notes having increasing abdominal distention and bloating. Denies fevers, melena, hematochezia or peripheral edema.     OBJECTIVE:  VS: /68 (BP Location: Left arm)  Pulse 82  Temp 98.4  F (36.9  C) (Oral)  Resp 18  Wt 99.6 kg (219 lb 8 oz)  SpO2 96%  BMI 29.77 kg/m2   General: In no acute distress, mild facial muscle wasting  Neuro: AOx3, Trace asterixis  HEENT:  Moderate scleral icterus  CV: S1/S2with gr 2/4 murmurs. Skin warm and dry  Lungs: clear to auscultation Respirations even and nonlabored on room air  Abd: Nontender, mildly distended  Extrem: Noperipehral edema  Skin: Moderate jaundice    REVIEW OF LABORATORY, PATHOLOGY AND IMAGING RESULTS:  BMP  Recent Labs  Lab 02/23/17  0648 02/22/17  1933 02/22/17  0643 02/22/17  0014 02/21/17  1844 02/21/17  1520   * 126* 133 129* 126* 124*   POTASSIUM 5.3  --  4.8 5.1 5.9* 6.0*   CHLORIDE 96  --  100  --  94 90*   JACOB 9.0  --  9.0  --  8.6 8.4*   CO2 21  --  22  --  21 21   BUN 42*  --  46*  --  50* 44*   CR 1.29*  --  1.40*  --  1.64* 1.46*   *  --  147*  --  384* 410*     CBC  Recent Labs  Lab 02/23/17  0648 02/22/17  0643 02/21/17  1844 02/21/17  1520   WBC 5.9 5.1 6.2 6.3   RBC 3.09* 3.03* 3.11* 3.08*   HGB 10.7* 10.3* 10.9* 10.4*   HCT 30.1* 29.5* 30.1* 29.9*   MCV 97 97 97 97   MCH 34.6* 34.0* 35.0* 33.8*   MCHC 35.5 34.9 36.2 34.8   RDW 15.8* 15.6* 15.8* 15.6*   PLT 39* 37* 38* 36*     INR  Recent Labs  Lab 02/23/17  0648 02/22/17  0643 02/21/17  1520 02/21/17  1200   INR 1.67* 1.67* 1.64* 1.50*     LFTs  Recent Labs  Lab 02/23/17  0648 02/22/17  0643 02/21/17  1520 02/21/17  1200   ALKPHOS 198* 211* 270* 328*   * 130* 120* 143*   ALT 91* 77* 87* 100*   BILITOTAL 32.7* 31.1* 31.8* 34.2*   PROTTOTAL 5.0* 5.4* 5.2* 5.7*   ALBUMIN 2.9* 3.3* 3.2* 2.9*      PANC  Recent  Labs  Lab 02/21/17  1520   LIPASE 326      MELD-Na score: 31 at 2/23/2017  6:48 AM  MELD score: 28 at 2/23/2017  6:48 AM  Calculated from:  Serum Creatinine: 1.29 mg/dL at 2/23/2017  6:48 AM  Serum Sodium: 130 mmol/L at 2/23/2017  6:48 AM  Total Bilirubin: 32.7 mg/dL at 2/23/2017  6:48 AM  INR(ratio): 1.67 at 2/23/2017  6:48 AM  Age: 51 years      Imaging Results:  MRI w/wo 1/6/17  IMPRESSION:  1. Cirrhotic appearance of the liver with evidence of portal  hypertension including splenomegaly and small-to-moderate ascites.  2. Hepatic segment 3 previously treated lesion. The overall treatment  zone size is decreased, though the degree of enhancing soft tissue  within the treatment zone is not substantially changed. This low-level  enhancing tissue continues to remain concerning for malignancy and  attention on short-term follow-up is needed. OPTN 5T.  3. Based on this exam alone, the patient is within [place] criteria.    IMPRESSION:  Camacho Bhagat is a 51 year old male with a history of [place] cirrhosis, HCC s/p TACE/RFA, SBP, ascites, HE, HLD, HTN, and DM II who was admitted with evidence of HE. He is listed for liver transplant but currently on hold due to need for cardiology clearance. He has had mild improvement in his mentation and has been started on empiric treatment of SBP. His sodium level has improved with hydration.     RECOMMENDATIONS:   1. Hepatic encephalopathy  - continue lactulose, titrating for 3-5 stools per day  - rifaximin 550 mg BID  - infectious work up pending, started on zosyn for empiric treatment.      2. Ascites  3. SBP hx  - diagnostic para today.  Sample from 2/15 negative for SBP  - will need SBP ppx after treatment with zosyn     4. [place] cirrhosis  5. HCC  6. Transplant candidacy  - listed for transplant. Current MELD 31. Will need clearance from cardiology prior to activation. Cardiology following  - Continues to have worsening bilirubin. Please repeat imaging of abdomen with MRI w/wo to  evaluate for recurrent HCC     7. Hyponatremia  - If sodium level remains stable, he should be between 1.5-2 liter fluid restriction daily. Discussed not drinking gatorade due to large sodium content.        Patient was discussed with attending physician, Dr. Mary    Thank you for the opportunity to be involved with  Camacho TERESA Olayinka luciano. Please call with any questions or concerns.    LAMIN Castañeda, CNP  [phone]

## 2017-02-23 NOTE — CONSULTS
PRE-OPERATIVE CARDIAC EVALUATION  February 23, 2017    Reason for Consult:  A cardiology consult was requested by Dr. Yoan Martin  from the medicine service to provide clinical guidance regarding cardiac clearance for a liver transplant in the setting of CASTAÑEDA and hepatic encephalopathy.    HPI:   Camacho Bhagat is a 51 year old male with history of CASTAÑEDA, HCC status post TACE, ESLD, DM II, DVT status post IVC, paroxysmal atrial fibrillation with resulting CVA in 2001, RONNIE, and RA. The patient has therapeutic paracentesis 2x per week. He has had multiple recent admissions; his most recent admissions include 2/14-2/16 when he was admitted for hyperkalemia and hyponatremia  (K 6.3, Na 120, total bili 30.7), and discharged with dietary and fluid restrictions. He was readmitted 2/21 for hepatic encephalopathy with persistent hyperkalemia, hyponatremia, and bilirubinemia. He is listed for liver transplant but is currently on hold due to need for cardiology clearance - we are thus asked to consult.     The patient did have a dobutamine stress echo performed 2/8/17. There was no inducible ischemia with this study. The right ventricular systolic pressure was borderline elevated and was approximated at 36 mmHg plus the right atrial pressure though the TR jet was sub-optimal.     An EKG 2/21/17 was NSR and otherwise unchanged from previous studies. He is observed on monitor to be in a NSR with HR in the 80's.   His current MELD score is 31 (Na 130, Creat 1.29, Bilirubin total 32.7, INR 1.67).     The patient is a poor historian. However, he denies any current or recent chest pain, dyspnea, weakness, fatigue, or syncope. He describes an active lifestyle as an outpatient, often going for long walks with his dog without difficulty, needing to pause, or chest pain. He denies orthopnea, LE edema, or PND. He states that at present, he feels well.       REVIEW OF SYSTEMS:    Complete review of systems was performed and  negative except per HPI.    PMH:  Past Medical History   Diagnosis Date     Acute venous embolism and thrombosis of other specified veins 11/01     Deep vein thrombophlebitis, filter placed     Cancer (H)      Depressive disorder, not elsewhere classified      Esophageal reflux      Fibromyalgia 1/2009     dx with Dr Benitez( Rheum)     Osteoarthritis      Other and unspecified hyperlipidemia      Other chronic nonalcoholic liver disease      Fatty liver      Other testicular hypofunction      Pneumonia, organism unspecified 10-01     Included ARDS, sepsis, and  acute renal failure; hospitalized     Rheumatoid arthritis(714.0)      Type II or unspecified type diabetes mellitus without mention of complication, not stated as uncontrolled 11/01     Managed by endocrinology     Unspecified essential hypertension 11/01     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     Unspecified sleep apnea      Uses BiPAP     ACTIVE PROBLEMS:  Patient Active Problem List    Diagnosis Date Noted     Hyperkalemia 02/14/2017     Priority: Medium     Hypoglycemia 11/29/2016     Priority: Medium     Hepatic encephalopathy (H) 11/24/2016     Priority: Medium     Urinary tract infection associated with catheterization of urinary tract (H) 11/24/2016     Priority: Medium     Altered mental status 11/24/2016     Priority: Medium     Altered mental status, unspecified 11/22/2016     Priority: Medium     Type 2 diabetes mellitus without complication, with long-term current use of insulin (H) 11/13/2016     Priority: Medium     Uncontrolled type 2 diabetes mellitus with hyperglycemia, with long-term current use of insulin (H) 11/10/2016     Priority: Medium     Debility 11/10/2016     Priority: Medium     Edema 10/26/2016     Priority: Medium     Cellulitis of scrotum 10/26/2016     Priority: Medium     HCC (hepatocellular carcinoma) (H) 07/06/2016     Priority: Medium     Hepatic cirrhosis, unspecified hepatic cirrhosis type (H) 02/24/2016      Priority: Medium     Preoperative cardiovascular examination 02/24/2016     Priority: Medium     Hepatocellular carcinoma (H) 01/27/2016     Priority: Medium     Hepatic cirrhosis (H) 01/18/2015     Priority: Medium     Osteoarthritis 01/18/2015     Priority: Medium     Rheumatoid arthritis (H) 01/18/2015     Priority: Medium     Pain medication agreement signed - Shay Sick 2/7/14 02/07/2014     Priority: Medium     Medication refill- do not delete  11/13/2013     Priority: Medium     Diabetes mellitus with neurological manifestation (H) 09/07/2012     Cerebrovascular accident (H) 07/02/2012     Knee pain 09/16/2011     Primary localized osteoarthrosis, lower leg 09/16/2011     Fibromyalgia 08/15/2011     OA (osteoarthritis) 08/15/2011     Cirrhosis (H) 08/15/2011     Sicca syndrome (H) 08/15/2011     TYPE 2 DIABETES, HBA1C GOAL < 7% 10/31/2010     HYPERLIPIDEMIA LDL GOAL <100 10/31/2010     Testicular hypofunction      Problem list name updated by automated process. Provider to review       Sleep apnea      Uses BiPAP  Problem list name updated by automated process. Provider to review       Hyperlipidemia 10/10/2005     Problem list name updated by automated process. Provider to review       Depressive disorder, not elsewhere classified 05/29/2003     Esophageal reflux 10/08/2002     Diabetes mellitus, type 2 (H) 07/08/2002     Managed by [facility] Dr Ralph Solorio MD  Problem list name updated by automated process. Provider to review       Carpal tunnel syndrome 07/08/2002     Essential hypertension 07/08/2002     Problem list name updated by automated process. Provider to review       Personal history of thrombophlebitis 07/08/2002     SOCIAL HISTORY:  Social History   Substance Use Topics     Smoking status: Former Smoker     Smokeless tobacco: Former User     Types: Chew     Quit date: 10/8/2015      Comment: Has used chewing tobacco since age 15 , chewed for 20yrs      Alcohol use No       Comment: last drink about a year ago (quit 2001)     FAMILY HISTORY:  Family History   Problem Relation Age of Onset     DIABETES Father      Hypertension Father      Substance Abuse Father      Arthritis Mother      CANCER Mother      Thyroid     Thyroid Disease Mother      Other Cancer Mother      Colon Cancer No family hx of      Hyperlipidemia No family hx of      Coronary Artery Disease No family hx of      CEREBROVASCULAR DISEASE No family hx of      Breast Cancer No family hx of      Prostate Cancer No family hx of        MEDICATIONS:    lactulose  10 g Oral TID     rifaximin  550 mg Oral BID     omeprazole  20 mg Oral QAM AC     piperacillin-tazobactam  4.5 g Intravenous Q6H     vitamin  B-1  100 mg Oral Daily     folic acid  1 mg Oral Daily     insulin glargine  20 Units Subcutaneous QAM AC     insulin aspart  1-7 Units Subcutaneous TID AC     insulin aspart  1-5 Units Subcutaneous At Bedtime         - MEDICATION INSTRUCTIONS -         PHYSICAL EXAM:  Temp:  [97.7  F (36.5  C)-98.5  F (36.9  C)] 97.7  F (36.5  C)  Pulse:  [81] 81  Heart Rate:  [73-83] 73  Resp:  [16-18] 16  BP: (133-157)/(58-72) 148/72  SpO2:  [97 %-99 %] 97 %    Intake/Output Summary (Last 24 hours) at 02/23/17 1126  Last data filed at 02/23/17 0852   Gross per 24 hour   Intake             1100 ml   Output             3000 ml   Net            -1900 ml     GEN: NAD, pleasant  HEENT: scleral icterus JVP not elvated  CV: RRR, normal s1/s2, , no heave. No murmur or gallop heard  CHEST: CTAB unlabored  ABD: soft, NT/ND, distended no bruits, normal bowel sounds  : no flank/suprapubic tenderness  NEURO: AA&O, Poor historian, answering questions, fluent/appropriate, motor grossly nonfocal   PSYCH: cooperative, affect appropriate    LABS:  All labs and imaging were reviewed, of note:    All laboratory data reviewed    Lab Results   Component Value Date    TROPI <0.012 10/30/2008    TROPI <0.012 10/09/2008    TROPI <0.012 10/09/2008    TROPI  <0.012 10/08/2008    TROPI <0.012 02/29/2008    TROPONIN <0.04 01/10/2007    TROPONIN <0.04 01/10/2007    TROPONIN <0.04 01/09/2007    TROPONIN <0.04 11/04/2006    TROPONIN <0.04 11/04/2006    TROPR Negative 11/04/2006       EKG:      DIAGNOSTIC STUDIES:    ECHO dobutamine stress test (2/8/17):  Normal dobutamine stress echocardiogram without evidence of inducible ischemia. Target heart rate was achieved. Heart rate and blood pressure response to dobutamine were normal. With stress, the left ventricular ejection fraction increased from 55-60% to greater than 65% and the left ventricular size decreased appropriately. No subjective symptoms to suggest ischemia.  There was no ECG evidence of ischemia.  No significant valve disease on screening doppler evaluation. The aortic root  and visualized ascending aorta are normal.  The right ventricular systolic pressure is borderline elevated and  approximated at 36 mmHg plus the right atrial pressure, though the TR jet is  sub-optimal.    Stress  The patient did not exhibit any symptoms during drug infusion.  Drug infusion stopped due to maximum amount of Dobutamine and Atropine given.  Target HR not achieved.  The maximum dose of dobutamine was 50mcg/kg/min.  The maximum dose of atropine was 2.0mg.  The maximum dose of metoprolol was 4.0mg.  Optison (NDC #4430-1541-97) given intravenously.  Patient was given 7 ml mixture of 3 ml Optison and 6 ml saline.  2 ml wasted.  IV start location R Forearm .  Optison Expiration 03/15/2018 .  Optison Lot # [id number] .  This was a normal stress EKG with no evidence of stress-induced ischemia.     Baseline  Normal baseline electrocardiogram.     Stress Results                                       Maximum Predicted HR:   168 bpm             Target HR: 143 bpm        % Maximum Predicted HR: 82 %                             Stage  DurationHeart Rate  BP                                 (mm:ss)   (bpm)                         Baseline  0:00       91    164/69                           Peak    9:42     138    138/57                            Stress Duration:   9:42 mm:ss *                      Maximum Stress HR: 138 bpm *     Left Ventricle  Left ventricular systolic function is normal. The visual ejection fraction is  estimated at 60-65%.     Right Ventricle  The right ventricle is normal in size and function.     Mitral Valve  The mitral valve is normal in structure and function.     Tricuspid Valve  The tricuspid valve is normal in structure and function. The right ventricular  systolic pressure is approximated at 36mmHg plus the right atrial pressure.     Aortic Valve  The aortic valve is normal in structure and function.     Pulmonic Valve  The pulmonic valve is normal in structure and function.     Pericardium  There is no pericardial effusion.     Procedure  Dobutamine Echo Complete. Contrast Optison.     MMode/2D Measurements & Calculations  asc Aorta Diam: 3.4 cm     Doppler Measurements & Calculations  MV E max juliano: 79.2 cm/sec  MV A max juliano: 100.0 cm/sec  MV E/A: 0.79     MV max P.7 mmHg  MV mean P.7 mmHg  MV V2 VTI: 29.6 cm  MV dec time: 0.28 sec  PA V2 max: 189.4 cm/sec  PA max P.3 mmHg  PA acc time: 0.08 sec  TR max juliano: 301.3 cm/sec  TR max P.3 mmHg      ASSESSMENT:  Camacho Bhagat is a 51 year old male with history of CASTAÑEDA, HCC status post TACE, ESLD, DM II, DVT status post IVC, paroxysmal atrial fibrillation with resulting CVA in , RONNIE, and RA. The patient has therapeutic paracentesis 2x per week. He has had multiple recent admissions; his most recent admissions include - when he was admitted for hyperkalemia and hyponatremia (K 6.3, Na 120, total bili 30.7), and discharged with dietary and fluid restrictions. He was readmitted  for hepatic encephalopathy with persistent hyperkalemia, hyponatremia, and bilirubinemia. He is listed for liver transplant but is currently on hold due to need for cardiology  clearance - we are thus asked to consult.     His dobutamine stress echo is negative for ischemia though reveals mild elevation in his RVSP to 36 mmHg. His EKG is unchanged. He is hemodynamically stable. His current MELD score is 31 (Na 130, Creat 1.29, Bili total 32.7, INR 1.67).     RECOMMENDATIONS:  1) His dobutamine stress echocardiogram revealed mild elevation in his RVSP to 36 mmHg. To definitely rule out portal pulmonary hypertension, we would recommend performing a right heart cath tomorrow in the AM.   2) His recent INR was 1.67, PLT 39k. We recommend checking a CBC tomorrow to reevaluate his PLT count prior to right heart cath.   3) It may be necessary to transfuse 1 unit FFP tomorrow before RHC, but we will defer this decision to the cath lab team and we will call in the AM to confirm.     Pre-operative cardiac evaluation:  -- Ischemic evaluation:  No evidence of ischemia on EKG or dobutamine stress echocardiogram.  -- Cardiopulmonary evaluation: Dobutamine stress echocardiogram reveals mild elevation in the RVSP to 36 mmHg. To definitely rule out portal pulmonary hypertension, we recommend a right heart cath tomorrow in the AM as outlined above.    I have discussed the above with Dr. Liu    Thank you for consulting the cardiovascular services at the [facility]. Please do not hesitate to call us with any questions.     CARLOS Johnson   [facility] Cardiology Consult Team  Pager [phone] or [phone]    Attending: Patient seen and examined with Mr. Urbina (CARLOS TERRELL). The H&P is accurate as recorded. My additional findings, if any, have been incorporated into the body of the note. CV exam shows no evidence of an active cardiac condition. All labs and imaging studies reviewed personally. The assessment and recommendations outlined above were reached after careful discussion and review and reflect our joint recommendations.     Vivek Liu MD

## 2017-02-24 ENCOUNTER — APPOINTMENT (OUTPATIENT)
Dept: CARDIOLOGY | Facility: CLINIC | Age: 52
DRG: 005 | End: 2017-02-24
Attending: PHYSICIAN ASSISTANT
Payer: COMMERCIAL

## 2017-02-24 ENCOUNTER — ALLIED HEALTH/NURSE VISIT (OUTPATIENT)
Dept: RESEARCH | Facility: CLINIC | Age: 52
End: 2017-02-24

## 2017-02-24 DIAGNOSIS — Z00.6 EXAMINATION OF PARTICIPANT IN CLINICAL TRIAL: Primary | ICD-10-CM

## 2017-02-24 LAB
ALBUMIN SERPL-MCNC: 2.6 G/DL (ref 3.4–5)
ALP SERPL-CCNC: 222 U/L (ref 40–150)
ALT SERPL W P-5'-P-CCNC: 91 U/L (ref 0–70)
ANION GAP SERPL CALCULATED.3IONS-SCNC: 12 MMOL/L (ref 3–14)
ANION GAP SERPL CALCULATED.3IONS-SCNC: 13 MMOL/L (ref 3–14)
AST SERPL W P-5'-P-CCNC: 144 U/L (ref 0–45)
BILIRUB SERPL-MCNC: 32.8 MG/DL (ref 0.2–1.3)
BUN SERPL-MCNC: 38 MG/DL (ref 7–30)
BUN SERPL-MCNC: 41 MG/DL (ref 7–30)
CALCIUM SERPL-MCNC: 8.4 MG/DL (ref 8.5–10.1)
CALCIUM SERPL-MCNC: 8.7 MG/DL (ref 8.5–10.1)
CHLORIDE SERPL-SCNC: 91 MMOL/L (ref 94–109)
CHLORIDE SERPL-SCNC: 94 MMOL/L (ref 94–109)
CO2 SERPL-SCNC: 19 MMOL/L (ref 20–32)
CO2 SERPL-SCNC: 20 MMOL/L (ref 20–32)
CREAT SERPL-MCNC: 1.43 MG/DL (ref 0.66–1.25)
CREAT SERPL-MCNC: 1.47 MG/DL (ref 0.66–1.25)
ERYTHROCYTE [DISTWIDTH] IN BLOOD BY AUTOMATED COUNT: 15.7 % (ref 10–15)
GFR SERPL CREATININE-BSD FRML MDRD: 50 ML/MIN/1.7M2
GFR SERPL CREATININE-BSD FRML MDRD: 52 ML/MIN/1.7M2
GLUCOSE SERPL-MCNC: 251 MG/DL (ref 70–99)
GLUCOSE SERPL-MCNC: 320 MG/DL (ref 70–99)
HCT VFR BLD AUTO: 28.9 % (ref 40–53)
HGB BLD-MCNC: 10.2 G/DL (ref 13.3–17.7)
INR PPP: 1.65 (ref 0.86–1.14)
MAGNESIUM SERPL-MCNC: 2.1 MG/DL (ref 1.6–2.3)
MCH RBC QN AUTO: 34.2 PG (ref 26.5–33)
MCHC RBC AUTO-ENTMCNC: 35.3 G/DL (ref 31.5–36.5)
MCV RBC AUTO: 97 FL (ref 78–100)
PLATELET # BLD AUTO: 41 10E9/L (ref 150–450)
POTASSIUM SERPL-SCNC: 4.8 MMOL/L (ref 3.4–5.3)
POTASSIUM SERPL-SCNC: 5.1 MMOL/L (ref 3.4–5.3)
PROT SERPL-MCNC: 4.9 G/DL (ref 6.8–8.8)
RBC # BLD AUTO: 2.98 10E12/L (ref 4.4–5.9)
SODIUM SERPL-SCNC: 124 MMOL/L (ref 133–144)
SODIUM SERPL-SCNC: 125 MMOL/L (ref 133–144)
WBC # BLD AUTO: 6.4 10E9/L (ref 4–11)

## 2017-02-24 PROCEDURE — 4A023N6 MEASUREMENT OF CARDIAC SAMPLING AND PRESSURE, RIGHT HEART, PERCUTANEOUS APPROACH: ICD-10-PCS | Performed by: INTERNAL MEDICINE

## 2017-02-24 PROCEDURE — 93451 RIGHT HEART CATH: CPT | Mod: 26 | Performed by: INTERNAL MEDICINE

## 2017-02-24 PROCEDURE — 80053 COMPREHEN METABOLIC PANEL: CPT | Performed by: STUDENT IN AN ORGANIZED HEALTH CARE EDUCATION/TRAINING PROGRAM

## 2017-02-24 PROCEDURE — 99233 SBSQ HOSP IP/OBS HIGH 50: CPT | Mod: GC | Performed by: PEDIATRICS

## 2017-02-24 PROCEDURE — 25000132 ZZH RX MED GY IP 250 OP 250 PS 637: Performed by: STUDENT IN AN ORGANIZED HEALTH CARE EDUCATION/TRAINING PROGRAM

## 2017-02-24 PROCEDURE — 12000006 ZZH R&B IMCU INTERMEDIATE UMMC

## 2017-02-24 PROCEDURE — 83735 ASSAY OF MAGNESIUM: CPT | Performed by: INTERNAL MEDICINE

## 2017-02-24 PROCEDURE — 27210787 ZZH MANIFOLD CR2

## 2017-02-24 PROCEDURE — 93451 RIGHT HEART CATH: CPT

## 2017-02-24 PROCEDURE — 25000132 ZZH RX MED GY IP 250 OP 250 PS 637: Performed by: INTERNAL MEDICINE

## 2017-02-24 PROCEDURE — 27210982 ZZH KIT RT HC TOTES DISP CR7

## 2017-02-24 PROCEDURE — 36415 COLL VENOUS BLD VENIPUNCTURE: CPT | Performed by: INTERNAL MEDICINE

## 2017-02-24 PROCEDURE — A9585 GADOBUTROL INJECTION: HCPCS | Performed by: RADIOLOGY

## 2017-02-24 PROCEDURE — 27211181 ZZH BALLOON TIP PRESSURE CR5

## 2017-02-24 PROCEDURE — 36415 COLL VENOUS BLD VENIPUNCTURE: CPT | Performed by: STUDENT IN AN ORGANIZED HEALTH CARE EDUCATION/TRAINING PROGRAM

## 2017-02-24 PROCEDURE — 85027 COMPLETE CBC AUTOMATED: CPT | Performed by: STUDENT IN AN ORGANIZED HEALTH CARE EDUCATION/TRAINING PROGRAM

## 2017-02-24 PROCEDURE — 25500064 ZZH RX 255 OP 636: Performed by: RADIOLOGY

## 2017-02-24 PROCEDURE — 80048 BASIC METABOLIC PNL TOTAL CA: CPT | Performed by: INTERNAL MEDICINE

## 2017-02-24 PROCEDURE — 25000128 H RX IP 250 OP 636: Performed by: INTERNAL MEDICINE

## 2017-02-24 PROCEDURE — 85610 PROTHROMBIN TIME: CPT | Performed by: STUDENT IN AN ORGANIZED HEALTH CARE EDUCATION/TRAINING PROGRAM

## 2017-02-24 RX ORDER — FUROSEMIDE 10 MG/ML
20 INJECTION INTRAMUSCULAR; INTRAVENOUS ONCE
Status: COMPLETED | OUTPATIENT
Start: 2017-02-24 | End: 2017-02-24

## 2017-02-24 RX ORDER — CIPROFLOXACIN 250 MG/1
500 TABLET, FILM COATED ORAL
Status: DISCONTINUED | OUTPATIENT
Start: 2017-02-24 | End: 2017-02-27

## 2017-02-24 RX ORDER — ALBUMIN (HUMAN) 12.5 G/50ML
50 SOLUTION INTRAVENOUS ONCE
Status: DISCONTINUED | OUTPATIENT
Start: 2017-02-24 | End: 2017-02-24

## 2017-02-24 RX ORDER — FUROSEMIDE 40 MG
40 TABLET ORAL DAILY
Status: DISCONTINUED | OUTPATIENT
Start: 2017-02-24 | End: 2017-02-24

## 2017-02-24 RX ORDER — GADOBUTROL 604.72 MG/ML
0.1 INJECTION INTRAVENOUS ONCE
Status: DISCONTINUED | OUTPATIENT
Start: 2017-02-24 | End: 2017-03-09

## 2017-02-24 RX ORDER — FUROSEMIDE 40 MG
40 TABLET ORAL DAILY
Status: DISCONTINUED | OUTPATIENT
Start: 2017-02-25 | End: 2017-02-27

## 2017-02-24 RX ORDER — DIPHENHYDRAMINE HCL 25 MG
25 CAPSULE ORAL ONCE
Status: COMPLETED | OUTPATIENT
Start: 2017-02-24 | End: 2017-02-24

## 2017-02-24 RX ADMIN — RIFAXIMIN 550 MG: 550 TABLET ORAL at 19:01

## 2017-02-24 RX ADMIN — INSULIN GLARGINE 20 UNITS: 100 INJECTION, SOLUTION SUBCUTANEOUS at 08:45

## 2017-02-24 RX ADMIN — PIPERACILLIN AND TAZOBACTAM 4.5 G: 4; .5 INJECTION, POWDER, FOR SOLUTION INTRAVENOUS at 01:07

## 2017-02-24 RX ADMIN — LACTULOSE 10 G: 20 SOLUTION ORAL at 19:01

## 2017-02-24 RX ADMIN — PIPERACILLIN AND TAZOBACTAM 4.5 G: 4; .5 INJECTION, POWDER, FOR SOLUTION INTRAVENOUS at 08:44

## 2017-02-24 RX ADMIN — RIFAXIMIN 550 MG: 550 TABLET ORAL at 08:45

## 2017-02-24 RX ADMIN — Medication 100 MG: at 08:45

## 2017-02-24 RX ADMIN — FUROSEMIDE 20 MG: 10 INJECTION, SOLUTION INTRAVENOUS at 16:34

## 2017-02-24 RX ADMIN — CIPROFLOXACIN HYDROCHLORIDE 500 MG: 250 TABLET, FILM COATED ORAL at 09:30

## 2017-02-24 RX ADMIN — DIPHENHYDRAMINE HYDROCHLORIDE 25 MG: 25 CAPSULE ORAL at 02:08

## 2017-02-24 RX ADMIN — OMEPRAZOLE 20 MG: 20 CAPSULE, DELAYED RELEASE ORAL at 08:44

## 2017-02-24 RX ADMIN — FOLIC ACID 1 MG: 1 TABLET ORAL at 08:44

## 2017-02-24 NOTE — PROGRESS NOTES
Transplant Research Organization Notification of Study Eligibility  Study Name: International Randomized Trial to Evaluate The Effectiveness of the Portable Organ Care System(OCS) Liver For Preserving and Assessing Donor Livers for Transplantation (OCS Liver PROTECT Trial) (IRB #[id number])  Per our IRB approved recruitment process, an eligibility letter and consent for the above study was sent to the patient however we did not receive a response. The patient was mailed consent form 1/6/17, then made inactive on waitlist. Will follow up when activated.   Please contact the , Nancy Dobbs at [phone] with any questions.

## 2017-02-24 NOTE — PROGRESS NOTES
Trent Villa went for a right heart cath today. We have reviewed his RHC findings as a group and we are in agreement that the patient is eligible for a liver transplant. Please see our previous consult note regarding this.     He does have evidence of hypervolemia     RA a,v, mean mm Hg        20, 18, 15  PA S/D, mean mm HG     43/25, 29  PAW a, v, mean mm Hg   23, 23, 20  PVR  Dyn.s.cm-5 (units)   72 (0.9)   KATHY CO index L/Min/m2   8.9 (4.0)    Recommendations:   - Lasix 20 mg IV once today   - Optimize his fluid status with close watch of I/O  - Judicious IV fluid use   - Continue to monitor his creatinine/GFR   - Otherwise patient is stable for plan for liver tx from our perspective.

## 2017-02-24 NOTE — PLAN OF CARE
Problem: Goal Outcome Summary  Goal: Goal Outcome Summary  Outcome: No Change  Patient is A&Ox3, intermittently confused. Vitally stable on RA. Denies pain. Up with 1 assist to commode. [place] with adequate output. Tolerating a mod cho diet - patient's blood sugars continue to be elevated. Will continue current plan of care and update MDs with any changes.

## 2017-02-24 NOTE — PLAN OF CARE
Problem: Goal Outcome Summary  Goal: Goal Outcome Summary  Outcome: No Change  /66  Pulse 84  Temp 97.9  F (36.6  C) (Oral)  Resp 18  Wt 100 kg (220 lb 8 oz) SpO2 99%  BMI 29.91 kg/m2     VS: VSS, afebrile  Neuro: A&Ox4.   Cardiac: SR.     Respiratory: Sating 99% on RA.  GI: BMx2 today, multiple BM's overnight. Did not give lactulose today (pt refused)  : Adequate urine output, vazquez for retention, icteric   IV Access: R PIV TKO  Drains/tubes: none  Diet/appetite: NPO  Activity:  Ax1 to commode  Pain: denies, occasional headache, denies need for pain meds  Skin: Intact, no new deficits noted.     Had R heart cath today (work up prior to liver txp, on waitlist)  Going to have abdominal MRI this afternoon      R: Continue with POC. Notify primary team with changes.

## 2017-02-24 NOTE — PROGRESS NOTES
[facility] 2  Daily Progress Note    Camacho Bhagat MRN# [id number]   Age: 51 year old YOB: 1965     Date of Admission: 2/21/2017  Date of Service: 02/24/2017    Main Plans for Today   - Right Heart Cath today.    - MRI to further evaluate for possible recurrence of HCC when renal function improves.   - Consider additional fluids given rise in creatinine.    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 Now admitted again due to concern for worsening encephalopathy and infection.       Hepatic Encephalopathy: Likely 2/2 to medication non compliance. However possible infection as well. Unable to obtain diagnostic paracentesis on admission due to very limited fluid pocket following therapeutic tap prior to arrival. Started empirically on Zosyn due to concern for SBP and ?aspiration pneumonia on CXR. Head CT on admission was negative for any acute intracranial process. Patient with mild improvement in mentation.  - Lactulose 3 times daily - PO. Titrate for 3-5 stools per day.   - Rifaximin 550 mg bid  - PRN Lactulose PO vs enemas per mental status.   - Nursing to continue HE protocol.       Cryptogenic cirrhosis/CASTAÑEDA  HCC S/P TACE 09/2016:   Recurrent Ascites/SBP:   Unclear if this is an infectious process vs. Non compliance as listed above. Continues to have worsening bilirubin.  - D/C Zosyn 02/24 as there is no clear sign of infection.  Switch to Cipro ppx 500 mg daily.    - Diagnostic paracentesis 02/23 - negative for SBP  - Remainder of infectious work up. UA no infectious signs. CXR ? Aspiration pneumonia - however no symptoms suggestive of pneumonia. Blood  cultures with no growth to date.   - Continue Rifaximin 550 mg BID.   - Hold diuretics given EJ.   - Hepatology consulted - appreciate recs.   - Transplant candidacy- awaiting listing for transplant due to high MELD.  Right heart cath 2/24 - to evaluate increased pulmonary pressure on echocardiogram.    - MRI abdomen w/wo contrast pending improvement of creatinine to evaluate for recurrence of HCC      Acute Kidney Injury:   Hyponatremia:   Hyperkalemia:   : Pt with recurrent issues with hyperkalemia - thought to be related to his ACEI and Aldactone - however he has been discontinued on this medication on recent discharges. K was 6 on admission - now improved. Hyponatremia is also improved   - EJ likely 2/2 to intravascular volume depletion. 6.1 liter paracentesis on 2/21.   - Cr now improving to 1.29 initially with albumin - increase to 1.4 2/24  - Trend.  Consider additional albumin.    - Strict I/Os. Daily weights.     Urinary retention  H/O Scrotal edema - Has been recent evaluated by urology.  Intermittently has needed a vazquez catheter due to retention on recent admissions.  Recurrent issue again requiring vazquez catheter placement on admit.  Evaluated for a scrotal mass on last admit - signs of possible inflammatory changes without a drainable fluid collection.    - Voiding trail after vazquez removal.    - Discuss with urology is this continues to be an issue - either inpatient vs. Outpt.       Type II DM: SSI. Lantus 30 units today.  NPO for heart cath today.  Will restart normal home dose of 65 units Lantus tomorrow.  -Started insulin aspart subq 2 units/15 g carbs  -Changed to high dose ISS  -Hypoglycemia protocol     ------------------------------------------------------------------------------------------------------------------  Prophylaxis:   -GI: PPI  -DVT: None due to bleeding risk.       FEN: Mod carbohydrate diet - he would like to meet with a dietician to eval for possible dietary sources for  hyperkalemia.   Consults: Hepatology, dietician. .   Family: Mother updated at bedside.   Code status: Full code.   Disposition: Pending further transplant work up, improvement of kidney function, and further eval of worsening liver disease.    =========================================================  Patient was discussed and evaluated with Dr. Mauro Martin   Internal Medicine PGY2  [phone]    Interval History   No acute events overnight.  Blood sugars elevated overnight - now improved this morning.  Afebrile and stable vital signs.  NPO for procedure today.  Denies any nausea, vomiting, or abdominal pain.     Medications   Current Facility-Administered Medications   Medication Dose Route Frequency     ciprofloxacin  500 mg Oral Q24H FRANCO     albumin human  50 g Intravenous Once     insulin aspart  1-10 Units Subcutaneous TID AC     insulin aspart   Subcutaneous TID w/meals     insulin aspart  1-7 Units Subcutaneous At Bedtime     lactulose  10 g Oral TID     rifaximin  550 mg Oral BID     omeprazole  20 mg Oral QAM AC     vitamin  B-1  100 mg Oral Daily     folic acid  1 mg Oral Daily     insulin glargine  20 Units Subcutaneous QAM AC     Current Facility-Administered Medications   Medication Last Rate     - MEDICATION INSTRUCTIONS -       Current Facility-Administered Medications   Medication Dose Route Frequency     insulin aspart   Subcutaneous With Snacks or Supplements     HOLD MEDICATION   Does not apply HOLD     - MEDICATION INSTRUCTIONS -   Does not apply Continuous PRN     lactulose  20 g Oral or NG Tube Q2H PRN    Or     lactulose  100 g Rectal Q2H PRN     naloxone  0.1-0.4 mg Intravenous Q2 Min PRN     glucose  15-30 g Oral Q15 Min PRN    Or     dextrose  25-50 mL Intravenous Q15 Min PRN    Or     glucagon  1 mg Subcutaneous Q15 Min PRN       Physical Exam   /66  Pulse 84  Temp 98.2  F (36.8  C) (Oral)  Resp 16  Wt 100 kg (220 lb 8 oz)  SpO2 98%  BMI 29.91 kg/m2  Wt Readings from  Last 1 Encounters:   02/24/17 100 kg (220 lb 8 oz)    Body mass index is 29.91 kg/(m^2).     General: No acute distress. Resting comfortably.    HEENT: MM moist.  EOMI. Scleral icterus.  PERRL.    Neck/Thyroid:  supple  Lungs: Clear to auscultation. No wheezes or rhonchi.    Heart:  RRR, soft systolic murmur, rubs or gallops.    Abdomen: Soft, non tender.  NA bowel sounds.  Obese.  No fluid wave present.     /Rectal: Crowe catheter present.    MSK:  normal muscle bulk and tone  Skin:  warm and dry, no rashes  Extremities: Trace lower extremity edema.    Neurological: No focal neurological findings.      Drains and Tubes: Crowe catheter.       Intravascular Access and Device: Peripheral IV in place.        Intake/Output Summary (Last 24 hours) at 02/24/17 1020  Last data filed at 02/24/17 0830   Gross per 24 hour   Intake               15 ml   Output             2700 ml   Net            -2685 ml     Data   Labs & Studies of Note: I personally reviewed the following studies:    CMP  Recent Labs  Lab 02/24/17  0454 02/23/17  2119 02/23/17  0648 02/22/17  1933 02/22/17  0643  02/21/17  1844 02/21/17  1520   * 124* 130* 126* 133  < > 126* 124*   POTASSIUM 5.1 5.6* 5.3  --  4.8  < > 5.9* 6.0*   CHLORIDE 91*  --  96  --  100  --  94 90*   CO2 19*  --  21  --  22  --  21 21   ANIONGAP 13  --  12  --  12  --  12 13   *  --  285*  --  147*  --  384* 410*   BUN 38*  --  42*  --  46*  --  50* 44*   CR 1.43*  --  1.29*  --  1.40*  --  1.64* 1.46*   GFRESTIMATED 52*  --  58*  --  53*  --  44* 51*   GFRESTBLACK 63  --  71  --  64  --  54* 61   JACOB 8.4*  --  9.0  --  9.0  --  8.6 8.4*   MAG  --   --   --   --   --   --  2.6*  --    PROTTOTAL 4.9*  --  5.0*  --  5.4*  --   --  5.2*   ALBUMIN 2.6*  --  2.9*  --  3.3*  --   --  3.2*   BILITOTAL 32.8*  --  32.7*  --  31.1*  --   --  31.8*   ALKPHOS 222*  --  198*  --  211*  --   --  270*   *  --  146*  --  130*  --   --  120*   ALT 91*  --  91*  --  77*  --   --   87*   < > = values in this interval not displayed.  CBC  Recent Labs  Lab 02/24/17  0454 02/23/17  0648 02/22/17  0643 02/21/17  1844   WBC 6.4 5.9 5.1 6.2   RBC 2.98* 3.09* 3.03* 3.11*   HGB 10.2* 10.7* 10.3* 10.9*   HCT 28.9* 30.1* 29.5* 30.1*   MCV 97 97 97 97   MCH 34.2* 34.6* 34.0* 35.0*   MCHC 35.3 35.5 34.9 36.2   RDW 15.7* 15.8* 15.6* 15.8*   PLT 41* 39* 37* 38*     INR  Recent Labs  Lab 02/24/17  0454 02/23/17  0648 02/22/17  0643 02/21/17  1520   INR 1.65* 1.67* 1.67* 1.64*       Venous Blood Gas No lab results found in last 7 days.  Arterial Blood Gas No lab results found in last 7 days.  Lactic Acid   Recent Labs  Lab 02/22/17  0643 02/22/17  0014 02/21/17  1844   LACT 1.9 2.0 2.5*       Inflammatory Markers  Recent Labs   Lab Test  11/23/16   0813  11/16/16   0706  11/15/16   1039  11/07/16   0759  11/03/16   0949  11/01/16   0853  10/29/16   0811  10/27/16   1110  08/15/11   1151  04/11/11   1209  01/03/11   1246  02/15/10   1232   SED  45*   --    --    --    --    --    --    --   11  14  17*  25*   CRP  58.0*  59.1*  49.8*  66.0*  140.0*  150.0*  170.0*  150.0*  11.1*  9.0*  11.7*  17.5*       Microbiology:    Culture Micro   Date Value Ref Range Status   02/23/2017 Culture negative monitoring continues  Final   02/21/2017 No growth after 3 days  Final   02/21/2017 No growth after 3 days  Final   12/10/2016 No growth  Final   12/09/2016 No growth  Final   11/23/2016 No growth  Final   11/23/2016 No anaerobes isolated  Final   11/23/2016 No growth  Final   11/22/2016 No growth  Final   11/22/2016 No growth  Final   11/22/2016 >100,000 colonies/mL Citrobacter koseri (A)  Final   11/18/2016 No growth  Final   11/18/2016 No anaerobes isolated  Final   06/11/2009 No growth  Final   02/18/2005 No growth  Final   02/17/2005   Final    Test canceled - Lab  error Charge credited     Recent Labs   Lab Test  02/23/17   1330  02/21/17   1918  02/21/17   1844   CULT  Culture negative monitoring  continues  No growth after 3 days  No growth after 3 days   SDES  Ascites  Ascites  Blood Right Arm  Blood Right Hand       Urine Studies:    Recent Labs   Lab Test  02/22/17   0034  02/16/17   1005  12/12/16   0150   LEUKEST  Negative  Trace*  Trace*   NITRITE  Negative  Negative  Negative   WBCU  5*  <1  0   RBCU  1  1  >182*

## 2017-02-24 NOTE — PROCEDURES
PRELIMINARY CARDIAC CATH REPORT:     PROCEDURES PERFORMED:   Right Heart Catheterization    PHYSICIANS:  Attending Physician: George Corrales MD  Interventional Cardiology Fellow: Jersey Connelly MD PhD  Cardiology Fellow: Dontrell Vital MD    INDICATION:  Camacho Bhagat is a 51 year old male with history of CASTAÑEDA, HCC status post TACE, ESLD, DM II, DVT status post IVC, paroxysmal atrial fibrillation with resulting CVA in 2001, RONNIE, and RA. He is here on an elective inpatient basis for right heart cath as part of a pre-transplant for liver evaluation.     DESCRIPTION:  1. Consent obtained with discussion of risks.  All questions were answered.  2. Sterile prep and procedure.  3. Location with Sheaths:   Rt IJ  7 Fr 10 cm [short]  4. Access: Local anesthetic with lidocaine.  A standard 18 guage needle with ultrasound guidance was used to establish vascular access using a modified Seldinger technique.  5. Diagnostic Catheters:   7 Fr  [place]ari  6. Estimated blood loss: < 5 ml    MEDICATIONS:  No sedation was given.   Heart rate, BP, respiration, oxygen saturation and patient responses were monitored throughout the procedure with the assistance of the RN under my supervision.    Procedures:    HEMODYNAMICS:  BSA 2.25  1. HR 65 bpm  2. /60/80 mmHg  3. RA 23/23/20   4. RV 44/16  5. PA 43/25/29   6. PCW 23/23/20   7. PA sat 75.2%   8. PCW sat 99%  9. Hgb 10.4 g/dL   10. Stuart CO 8.9   11. Stuart CI 4.0   12. TD CO 10.7   13. TD CI 4.8   14. PVR 0.9          Sheath Removal:  The [facility] IJ sheath was manually removed in the cardiac catheterization laboratory.    Contrast: none    Fluoroscopy Time: 2.6 min    COMPLICATIONS:  1. None    SUMMARY:   >> High right and left sided filling pressures.  >> Mild pulmonary artery hypertension, secondary to high cardiac output in the setting of liver failure  >> Hyperdynamic cardiac output, 8.9 L/min with index 4 L/min/m2    PLAN:   >> Patient to return to floor.    The attending  interventional cardiologist was present and supervised all critical aspects the procedure.    See CVIS report for final draft.    Dontrell Vital    Cardiology Fellow    Dr. Corrales   Cardiology Staff

## 2017-02-24 NOTE — MR AVS SNAPSHOT
After Visit Summary   2/24/2017    Camacho Bhagat    MRN: [id number]           Patient Information     Date Of Birth          1965        Visit Information        Provider Department      2/24/2017 10:51 AM Coordinator, [facility]  [facility]        Today's Diagnoses     Examination of participant in clinical trial    -  1       Follow-ups after your visit        Your next 10 appointments already scheduled     Feb 28, 2017 12:00 PM CST   Paracentesis Visit with Uc Spec Inf Para Provider, UC 39 ATC   [facility] ([facility])    [address]  [address]nd [facility] [address]   [phone]            Mar 03, 2017 12:00 PM CST   Paracentesis Visit with Uc Spec Inf Para Provider, UC 39 ATC   [facility] ([facility])    [address] [place] [address]   [phone]            Mar 06, 2017  4:00 PM CST   (Arrive by 3:45 PM)   NEW LIVER EVALUATION with Alecia Alston MD   [facility] ([facility])    [address] [place] [address]  3rd [facility] [address]   [phone]            Mar 07, 2017  2:00 PM CST   Paracentesis Visit with Uc Spec Inf Para Provider, UC 40 ATC   [facility] ([facility])    [address] [place] [address]   [phone]            Mar 10, 2017 12:00 PM CST   Paracentesis Visit with Uc Spec Inf Para Provider, UC 40 ATC   [facility] ([facility])    [address] [place] [address]   [phone]            Mar 14, 2017 12:00 PM CDT   Paracentesis Visit with Uc Spec Inf Para Provider   [facility]  [facility] ([facility])    [address]  2nd Floor  [facility] [address] [phone]              Who to contact     If you have questions or need follow up information about today's clinic visit or your schedule please contact [facility] directly at [phone].  Normal or non-critical lab and imaging results will be communicated to you by MyChart, letter or phone within 4 business days after the clinic has received the results. If you do not hear from us within 7 days, please contact the clinic through [employer] or phone. If you have a critical or abnormal lab result, we will notify you by phone as soon as possible.  Submit refill requests through [employer] or call your pharmacy and they will forward the refill request to us. Please allow 3 business days for your refill to be completed.          Additional Information About Your Visit        [employer] Information     [employer] gives you secure access to your electronic health record. If you see a primary care provider, you can also send messages to your care team and make appointments. If you have questions, please call your primary care clinic.  If you do not have a primary care provider, please call [phone] and they will assist you.        Care EveryWhere ID     This is your Care EveryWhere ID. This could be used by other organizations to access your [place] medical records  [id number]         Blood Pressure from Last 3 Encounters:   02/24/17 146/66   02/21/17 147/59   02/17/17 136/62    Weight from Last 3 Encounters:   02/24/17 100 kg (220 lb 8 oz)   02/21/17 107.4 kg (236 lb 12.8 oz)   02/17/17 106.5 kg (234 lb 11.2 oz)              Today, you had the following     No orders found for display         Today's Medication Changes      Notice     This visit is during an admission. Changes to the med list made in this visit will be reflected in the After Visit Summary of the admission.              Primary Care Provider Office Phone # Fax #    Shay Kirkpatrick [phone]        [facility] [address] [facility] [address]  [facility] [address]        Thank you!     Thank you for choosing [facility], [facility]  [facility]  for your care. Our goal is always to provide you with excellent care. Hearing back from our patients is one way we can continue to improve our services. Please take a few minutes to complete the written survey that you may receive in the mail after your visit with us. Thank you!             Your Updated Medication List - Protect others around you: Learn how to safely use, store and throw away your medicines at www.disposemymeds.org.      Notice     This visit is during an admission. Changes to the med list made in this visit will be reflected in the After Visit Summary of the admission.

## 2017-02-24 NOTE — PLAN OF CARE
Problem: Goal Outcome Summary  Goal: Goal Outcome Summary  Outcome: No Change  /80 (BP Location: Right arm)  Pulse 84  Temp 98.1  F (36.7  C) (Oral)  Resp 16  Wt 100 kg (220 lb 8 oz)  SpO2 97%  BMI 29.91 kg/m2  HR sinus rhythm, on room air. Maintaining adequate O2 sats.  Pt A&Ox4, [place] score of 0. Resting comfortably in between assessments. Using call light appropriately. Pt denies pain. NPO since midnight. Voiding adequate amounts of urine, urine very icteric. Having frequent loose BMs, continent on commode. Plan for right heart cath today. Nursing will continue to follow the POC and update the MD team with concerns.

## 2017-02-25 ENCOUNTER — APPOINTMENT (OUTPATIENT)
Dept: OCCUPATIONAL THERAPY | Facility: CLINIC | Age: 52
DRG: 005 | End: 2017-02-25
Payer: COMMERCIAL

## 2017-02-25 ENCOUNTER — APPOINTMENT (OUTPATIENT)
Dept: PHYSICAL THERAPY | Facility: CLINIC | Age: 52
DRG: 005 | End: 2017-02-25
Payer: COMMERCIAL

## 2017-02-25 LAB
ALBUMIN SERPL-MCNC: 2.8 G/DL (ref 3.4–5)
ALP SERPL-CCNC: 260 U/L (ref 40–150)
ALT SERPL W P-5'-P-CCNC: 98 U/L (ref 0–70)
ANION GAP SERPL CALCULATED.3IONS-SCNC: 13 MMOL/L (ref 3–14)
AST SERPL W P-5'-P-CCNC: 147 U/L (ref 0–45)
BILIRUB SERPL-MCNC: 36.4 MG/DL (ref 0.2–1.3)
BUN SERPL-MCNC: 42 MG/DL (ref 7–30)
CALCIUM SERPL-MCNC: 8.8 MG/DL (ref 8.5–10.1)
CHLORIDE SERPL-SCNC: 93 MMOL/L (ref 94–109)
CO2 SERPL-SCNC: 19 MMOL/L (ref 20–32)
CREAT SERPL-MCNC: 1.3 MG/DL (ref 0.66–1.25)
ERYTHROCYTE [DISTWIDTH] IN BLOOD BY AUTOMATED COUNT: 15.4 % (ref 10–15)
GFR SERPL CREATININE-BSD FRML MDRD: 58 ML/MIN/1.7M2
GLUCOSE SERPL-MCNC: 382 MG/DL (ref 70–99)
HCT VFR BLD AUTO: 30.8 % (ref 40–53)
HGB BLD-MCNC: 10.9 G/DL (ref 13.3–17.7)
INR PPP: 1.59 (ref 0.86–1.14)
MCH RBC QN AUTO: 34.3 PG (ref 26.5–33)
MCHC RBC AUTO-ENTMCNC: 35.4 G/DL (ref 31.5–36.5)
MCV RBC AUTO: 97 FL (ref 78–100)
PLATELET # BLD AUTO: 41 10E9/L (ref 150–450)
POTASSIUM SERPL-SCNC: 4.7 MMOL/L (ref 3.4–5.3)
PROT SERPL-MCNC: 5 G/DL (ref 6.8–8.8)
RBC # BLD AUTO: 3.18 10E12/L (ref 4.4–5.9)
SODIUM SERPL-SCNC: 124 MMOL/L (ref 133–144)
WBC # BLD AUTO: 7.2 10E9/L (ref 4–11)

## 2017-02-25 PROCEDURE — 25000125 ZZHC RX 250: Performed by: STUDENT IN AN ORGANIZED HEALTH CARE EDUCATION/TRAINING PROGRAM

## 2017-02-25 PROCEDURE — 25000132 ZZH RX MED GY IP 250 OP 250 PS 637: Performed by: INTERNAL MEDICINE

## 2017-02-25 PROCEDURE — 25000128 H RX IP 250 OP 636: Performed by: INTERNAL MEDICINE

## 2017-02-25 PROCEDURE — 40000193 ZZH STATISTIC PT WARD VISIT: Performed by: PHYSICAL THERAPIST

## 2017-02-25 PROCEDURE — 97162 PT EVAL MOD COMPLEX 30 MIN: CPT | Mod: GP | Performed by: PHYSICAL THERAPIST

## 2017-02-25 PROCEDURE — 97530 THERAPEUTIC ACTIVITIES: CPT | Mod: GP | Performed by: PHYSICAL THERAPIST

## 2017-02-25 PROCEDURE — 80053 COMPREHEN METABOLIC PANEL: CPT | Performed by: STUDENT IN AN ORGANIZED HEALTH CARE EDUCATION/TRAINING PROGRAM

## 2017-02-25 PROCEDURE — 36415 COLL VENOUS BLD VENIPUNCTURE: CPT | Performed by: STUDENT IN AN ORGANIZED HEALTH CARE EDUCATION/TRAINING PROGRAM

## 2017-02-25 PROCEDURE — 97535 SELF CARE MNGMENT TRAINING: CPT | Mod: GO | Performed by: OCCUPATIONAL THERAPIST

## 2017-02-25 PROCEDURE — 85610 PROTHROMBIN TIME: CPT | Performed by: STUDENT IN AN ORGANIZED HEALTH CARE EDUCATION/TRAINING PROGRAM

## 2017-02-25 PROCEDURE — 12000001 ZZH R&B MED SURG/OB UMMC

## 2017-02-25 PROCEDURE — 97165 OT EVAL LOW COMPLEX 30 MIN: CPT | Mod: GO | Performed by: OCCUPATIONAL THERAPIST

## 2017-02-25 PROCEDURE — 85027 COMPLETE CBC AUTOMATED: CPT | Performed by: STUDENT IN AN ORGANIZED HEALTH CARE EDUCATION/TRAINING PROGRAM

## 2017-02-25 PROCEDURE — 00000146 ZZHCL STATISTIC GLUCOSE BY METER IP

## 2017-02-25 PROCEDURE — 99233 SBSQ HOSP IP/OBS HIGH 50: CPT | Mod: GC | Performed by: PEDIATRICS

## 2017-02-25 PROCEDURE — 97116 GAIT TRAINING THERAPY: CPT | Mod: GP | Performed by: PHYSICAL THERAPIST

## 2017-02-25 PROCEDURE — 25000132 ZZH RX MED GY IP 250 OP 250 PS 637: Performed by: STUDENT IN AN ORGANIZED HEALTH CARE EDUCATION/TRAINING PROGRAM

## 2017-02-25 PROCEDURE — 40000133 ZZH STATISTIC OT WARD VISIT: Performed by: OCCUPATIONAL THERAPIST

## 2017-02-25 RX ORDER — ONDANSETRON 4 MG/1
4 TABLET, FILM COATED ORAL EVERY 6 HOURS PRN
Status: DISCONTINUED | OUTPATIENT
Start: 2017-02-25 | End: 2017-02-25

## 2017-02-25 RX ORDER — ACETAMINOPHEN 325 MG/1
325 TABLET ORAL ONCE
Status: COMPLETED | OUTPATIENT
Start: 2017-02-25 | End: 2017-02-25

## 2017-02-25 RX ORDER — ONDANSETRON 4 MG/1
4 TABLET, ORALLY DISINTEGRATING ORAL EVERY 6 HOURS PRN
Status: DISCONTINUED | OUTPATIENT
Start: 2017-02-25 | End: 2017-03-10 | Stop reason: HOSPADM

## 2017-02-25 RX ORDER — FUROSEMIDE 10 MG/ML
20 INJECTION INTRAMUSCULAR; INTRAVENOUS ONCE
Status: COMPLETED | OUTPATIENT
Start: 2017-02-25 | End: 2017-02-25

## 2017-02-25 RX ADMIN — FUROSEMIDE 40 MG: 40 TABLET ORAL at 08:29

## 2017-02-25 RX ADMIN — ONDANSETRON 4 MG: 4 TABLET, FILM COATED ORAL at 17:41

## 2017-02-25 RX ADMIN — OMEPRAZOLE 20 MG: 20 CAPSULE, DELAYED RELEASE ORAL at 08:29

## 2017-02-25 RX ADMIN — ACETAMINOPHEN 325 MG: 325 TABLET, FILM COATED ORAL at 23:48

## 2017-02-25 RX ADMIN — RIFAXIMIN 550 MG: 550 TABLET ORAL at 19:36

## 2017-02-25 RX ADMIN — LACTULOSE 10 G: 20 SOLUTION ORAL at 23:57

## 2017-02-25 RX ADMIN — RIFAXIMIN 550 MG: 550 TABLET ORAL at 08:29

## 2017-02-25 RX ADMIN — FUROSEMIDE 20 MG: 10 INJECTION, SOLUTION INTRAVENOUS at 11:13

## 2017-02-25 RX ADMIN — CIPROFLOXACIN HYDROCHLORIDE 500 MG: 250 TABLET, FILM COATED ORAL at 08:29

## 2017-02-25 RX ADMIN — LACTULOSE 10 G: 20 SOLUTION ORAL at 08:26

## 2017-02-25 ASSESSMENT — ACTIVITIES OF DAILY LIVING (ADL): PREVIOUS_RESPONSIBILITIES: MEAL PREP;HOUSEKEEPING;LAUNDRY;SHOPPING;MEDICATION MANAGEMENT;FINANCES;DRIVING

## 2017-02-25 ASSESSMENT — PAIN DESCRIPTION - DESCRIPTORS: DESCRIPTORS: ACHING

## 2017-02-25 NOTE — PROVIDER NOTIFICATION
Notified maroon cross cover, pt's [id number]. Per md, will give 7units per SS insulin, 1 time lantus order. Inquired about rechecking sodium due to increased blood glucose at this time, will check bmp in AM.

## 2017-02-25 NOTE — PLAN OF CARE
Problem: Goal Outcome Summary  Goal: Goal Outcome Summary  Outcome: Improving  Neuro: A&Ox4. Follows commands. No signs of encephalopathy.  Cardiac: SR. HR in the 60's. 's to 130's. Afebrile.    Respiratory: Sating in 90's-100's on RA. Lung fields clear.  GI/: Voiding well. Adequate output. Continue to monitor urine output closely. BM X 0.  Diet/appetite: Carb count diet/carb coverage with sliding scale.  Activity: Standby assist up to bathroom.  Pain: Denies  Skin: Jaundice. Intact with no new deficits noted.     Blood sugars have been on the higher side (300's 400's) this shift . Notified team. Gave 4 units of insulin overnight per orders. No further concerns. Continue with POC. Notify primary team with changes.

## 2017-02-25 NOTE — PLAN OF CARE
Problem: Goal Outcome Summary  Goal: Goal Outcome Summary  Outcome: Improving  Shift 5614-3608      A&Ox4. VSS on RA. SR. Afebrile. Denies pain and nausea. Tolerating carb consistent diet. Ambulates with stand by assistance to bathroom. BM x1 this shift (5 total for day). Scheduled lactulose given. Crowe removed at 1700, spontaneous void at 2000, 250cc output. Difficult to get PVR due to ascites. BG checks ac/hs. Continues to be hyperglycemic, 234, 298, SS insulin given with meals and snacks. Sodium 125 from 124 this AM, Md denied wanting replacement due to hyperglycemia.       Continue with POC. Notify primary team with changes.

## 2017-02-25 NOTE — PLAN OF CARE
Problem: Goal Outcome Summary  Goal: Goal Outcome Summary  PT 6B: Physical therapy orders received, evaluation completed, treatment initiated. Pt SBA for gait, independent with all transfers. No losses of balance or safety concerns throughout session. Pt unreliable historian, some cognitive concerns present. Pt has no acute PT needs at this time, PT orders will be completed. Pt educated on importance of continued mobility to maintain strength in preparation for transplant. Recommend discharge home with assist as needed when medically appropriate, however if pt has long LOS, PT may need to be re-ordered to maintain strength and aerobic conditioning.     Physical Therapy Discharge Summary     Reason for therapy discharge:    All goals and outcomes met, no further needs identified.     Progress towards therapy goal(s). See goals on Care Plan in [facility] electronic health record for goal details.  Goals met     Therapy recommendation(s):    Pt instructed to continue walking and daily mobility to maintain strength and endurance.

## 2017-02-25 NOTE — PROVIDER NOTIFICATION
Notified of blood sugar pre-meal 405. Pt has only had water since breakfast. MD increased correction insulin to 3 units per 15g of carb. RN to give appropriate insulin now, and then recheck bg after lunch. Page MD with result. If still high, may need to start insulin gtt. Will continue to monitor.

## 2017-02-25 NOTE — PLAN OF CARE
Problem: Goal Outcome Summary  Goal: Goal Outcome Summary  Outcome: Improving  /66  Pulse 84  Temp 97.7  F (36.5  C) (Oral)  Resp 16  Wt 104.5 kg (230 lb 6.4 oz)  SpO2 100%  BMI 31.25 kg/m2     VS: VSS, afebrile  Neuro: A&Ox4.   Cardiac: SR.   Respiratory: Sating 99% on RA.  GI: No BM's yet this shift, gave scheduled lactulose  : Adequate urine output via urinal. Had 600 out, Bladder scanned per MD request, and showed 300 residual BUT not a good picture and was likely showing ascites, not urine. MD okay with continuing to monitor.   IV Access: R PIV SL'd  Drains/tubes: none  Diet/appetite: diabetic diet  Activity: SBA  Pain: denies, occasional headache, denies need for pain meds  Skin: Intact, no new deficits noted.   Monitoring insulin. May need insulin gtt if sugars continue to be high.      R: Continue with POC. Notify primary team with changes.

## 2017-02-25 NOTE — PROGRESS NOTES
02/25/17 1400   Quick Adds   Type of Visit Initial Occupational Therapy Evaluation   Living Environment   Lives With child(gisell), adult;spouse   Living Arrangements house   Home Accessibility no concerns   Number of Stairs to Enter Home 0   Number of Stairs Within Home 0   Transportation Available family or friend will provide;car  (pt reports he does drive)   Living Environment Comment PLOF is inconsistant with PT evaluation and prior evaluations in chart.    Self-Care   Dominant Hand right   Usual Activity Tolerance good   Current Activity Tolerance good   Regular Exercise yes   Activity/Exercise Type walking   Exercise Amount/Frequency daily   Activity/Exercise/Self-Care Comment Reports walking his dog. Is interested in getting  a [employer] membership.    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 Now admitted again due to concern for worsening encephalopathy and infection.    General Observations Pt lying in bed, pleasant and agreeable.    General Info Comments activity:  up w A   Cognitive Status Examination   Orientation orientation to person, place and time   Level of Consciousness alert   Able to Follow Commands WNL/WFL   Memory impaired   Attention Sustained attention impaired   Executive Function Self awareness/monitoring impaired;Planning ability  "impaired;Cognitive flexibility impaired;Working memory impaired, decreased storage of information for performing tasks   Cognitive Comment Pt scored BNL on MOCA 17/30.    Visual Perception   Visual Perception No deficits were identified   Sensory Examination   Sensory Quick Adds No deficits were identified   Range of Motion (ROM)   ROM Quick Adds No deficits were identified   Strength   Strength Comments below baseline   Mobility   Bed Mobility Comments pt declined, defer to PT eval   Instrumental Activities of Daily Living (IADL)   Previous Responsibilities meal prep;housekeeping;laundry;shopping;medication management;finances;driving   Activities of Daily Living Analysis   Impairments Contributing to Impaired Activities of Daily Living cognition impaired;strength decreased   General Therapy Interventions   Planned Therapy Interventions ADL retraining   Intervention Comments cognition   Clinical Impression   Criteria for Skilled Therapeutic Interventions Met yes, treatment indicated   OT Diagnosis deconditioning, cognitive imp.   Influenced by the following impairments decreased cognition, weakness   Assessment of Occupational Performance 1-3 Performance Deficits   Identified Performance Deficits med management   Clinical Decision Making (Complexity) Low complexity   Therapy Frequency daily   Predicted Duration of Therapy Intervention (days/wks) 1x    Anticipated Discharge Disposition Home with Assist   Risks and Benefits of Treatment have been explained. Yes   Patient, Family & other staff in agreement with plan of care Yes   [facility] TM \"6 Clicks\"   2016, Trustees of [facility], under license to [employer].  All rights reserved.   6 Clicks Short Forms Daily Activity Inpatient Short Form   [facility]  \"6 Clicks\" Daily Activity Inpatient Short Form   1. Putting on and taking off regular lower body clothing? 4 - None   2. Bathing (including washing, rinsing, drying)? 4 - None "   3. Toileting, which includes using toilet, bedpan or urinal? 4 - None   4. Putting on and taking off regular upper body clothing? 4 - None   5. Taking care of personal grooming such as brushing teeth? 4 - None   6. Eating meals? 4 - None   Daily Activity Raw Score (Score out of 24.Lower scores equate to lower levels of function) 24   Total Evaluation Time   Total Evaluation Time (Minutes) 10

## 2017-02-25 NOTE — PROGRESS NOTES
02/25/17 1043   Quick Adds   Type of Visit Initial PT Evaluation       Present no   Living Environment   Lives With alone   Living Arrangements house   Home Accessibility no concerns   Number of Stairs to Enter Home 0   Number of Stairs Within Home 0   Transportation Available car;family or friend will provide   Living Environment Comment Unclear if pt is good historian, living environment much different than last admission 2 months ago   Self-Care   Dominant Hand right   Usual Activity Tolerance good   Current Activity Tolerance good   Regular Exercise yes   Activity/Exercise Type walking   Exercise Amount/Frequency daily   Equipment Currently Used at Home none   Activity/Exercise/Self-Care Comment Pt reports he is independent, walks his dog daily for light exercise, unable to discuss how far or how long he walks   Functional Level Prior   Ambulation 0-->independent   Transferring 0-->independent   Toileting 0-->independent   Bathing 0-->independent   Dressing 0-->independent   Eating 0-->independent   Communication 0-->understands/communicates without difficulty   Swallowing 0-->swallows foods/liquids without difficulty   Cognition 0 - no cognition issues reported   Fall history within last six months no   Which of the above functional risks had a recent onset or change? 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 Now admitted again due to concern for worsening  encephalopathy and infection.    Precautions/Limitations no known precautions/limitations   Heart Disease Risk Factors Medical history;Gender;Diabetes   General Observations Pt slow moving, agreeable, some possible confusion present    General Info Comments Activity orders: up with assist   Cognitive Status Examination   Orientation orientation to person, place and time   Level of Consciousness alert   Follows Commands and Answers Questions 100% of the time   Personal Safety and Judgment intact   Memory intact   Cognitive Comment pt alert and oriented however pt with some possible cognitive concerns, OT aware    Pain Assessment   Patient Currently in Pain No   Integumentary/Edema   Integumentary/Edema no deficits were identifed   Posture    Posture Protracted shoulders   Range of Motion (ROM)   ROM Quick Adds No deficits were identified   Strength   Strength Comments 4/5 strength throughout LE    Bed Mobility   Bed Mobility Comments IND   Transfer Skills   Transfer Comments IND with transfers    Gait   Gait Comments SBA for gait    Balance   Balance Comments No losses of balance throughout session    Sensory Examination   Sensory Perception no deficits were identified   Coordination   Coordination no deficits were identified   Muscle Tone   Muscle Tone no deficits were identified   General Therapy Interventions   Planned Therapy Interventions gait training;balance training;risk factor education;home program guidelines;progressive activity/exercise   Clinical Impression   Criteria for Skilled Therapeutic Intervention yes, treatment indicated   PT Diagnosis Impaired activity tolerance   Influenced by the following impairments Impaired tolerance, possible cognitive conerns    Functional limitations due to impairments impaired activity tolerance    Clinical Presentation Evolving/Changing   Clinical Presentation Rationale Pt with complex medical history   Clinical Decision Making (Complexity) Moderate complexity  "  Therapy Frequency` daily   Predicted Duration of Therapy Intervention (days/wks) 1 time treatment    Anticipated Discharge Disposition Home   Risk & Benefits of therapy have been explained Yes   Patient, Family & other staff in agreement with plan of care Yes   Clinical Impression Comments Pt with good mobility, no losses of balance, slightly impaired tolerance, some cognitive conerns, OT aware    [facility] TM \"6 Clicks\"   2016, Trustees of [facility], under license to [employer].  All rights reserved.   6 Clicks Short Forms Basic Mobility Inpatient Short Form   [facility] AM-PAC  \"6 Clicks\" V.2 Basic Mobility Inpatient Short Form   1. Turning from your back to your side while in a flat bed without using bedrails? 4 - None   2. Moving from lying on your back to sitting on the side of a flat bed without using bedrails? 4 - None   3. Moving to and from a bed to a chair (including a wheelchair)? 4 - None   4. Standing up from a chair using your arms (e.g., wheelchair, or bedside chair)? 4 - None   5. To walk in hospital room? 4 - None   6. Climbing 3-5 steps with a railing? 3 - A Little   Basic Mobility Raw Score (Score out of 24.Lower scores equate to lower levels of function) 23   Total Evaluation Time   Total Evaluation Time (Minutes) 6     "

## 2017-02-25 NOTE — PLAN OF CARE
Problem: Goal Outcome Summary  Goal: Goal Outcome Summary  OT 6B Orders received, evaluation complete, treatment initiated.  Pt reports no issues with dc to home. Reports his wife and daughter assist as needed, wife sets up meds.  Scored below baseline for strength/activity tolerance and cognition.  17/30 on MOCA (26 + is normal).  Deficits in STM, executive skills and attention.  Noted pt reports PLOF, this is inconsistent with reports to PT and prior evaluations.  Rec: home w A from family for complex IADLS

## 2017-02-26 LAB
ALBUMIN SERPL-MCNC: 2.6 G/DL (ref 3.4–5)
ALP SERPL-CCNC: 280 U/L (ref 40–150)
ALT SERPL W P-5'-P-CCNC: 93 U/L (ref 0–70)
ANION GAP SERPL CALCULATED.3IONS-SCNC: 14 MMOL/L (ref 3–14)
AST SERPL W P-5'-P-CCNC: 138 U/L (ref 0–45)
BILIRUB SERPL-MCNC: 34.3 MG/DL (ref 0.2–1.3)
BUN SERPL-MCNC: 53 MG/DL (ref 7–30)
CALCIUM SERPL-MCNC: 8.5 MG/DL (ref 8.5–10.1)
CHLORIDE SERPL-SCNC: 92 MMOL/L (ref 94–109)
CO2 SERPL-SCNC: 19 MMOL/L (ref 20–32)
CREAT SERPL-MCNC: 1.37 MG/DL (ref 0.66–1.25)
ERYTHROCYTE [DISTWIDTH] IN BLOOD BY AUTOMATED COUNT: 15.4 % (ref 10–15)
GFR SERPL CREATININE-BSD FRML MDRD: 54 ML/MIN/1.7M2
GLUCOSE SERPL-MCNC: 376 MG/DL (ref 70–99)
HCT VFR BLD AUTO: 29.4 % (ref 40–53)
HGB BLD-MCNC: 10.6 G/DL (ref 13.3–17.7)
INR PPP: 1.58 (ref 0.86–1.14)
MAGNESIUM SERPL-MCNC: 2.4 MG/DL (ref 1.6–2.3)
MCH RBC QN AUTO: 34.5 PG (ref 26.5–33)
MCHC RBC AUTO-ENTMCNC: 36.1 G/DL (ref 31.5–36.5)
MCV RBC AUTO: 96 FL (ref 78–100)
PLATELET # BLD AUTO: 50 10E9/L (ref 150–450)
POTASSIUM SERPL-SCNC: 4.8 MMOL/L (ref 3.4–5.3)
PROT SERPL-MCNC: 4.9 G/DL (ref 6.8–8.8)
RBC # BLD AUTO: 3.07 10E12/L (ref 4.4–5.9)
SODIUM SERPL-SCNC: 124 MMOL/L (ref 133–144)
WBC # BLD AUTO: 7.8 10E9/L (ref 4–11)

## 2017-02-26 PROCEDURE — 40000141 ZZH STATISTIC PERIPHERAL IV START W/O US GUIDANCE

## 2017-02-26 PROCEDURE — 36415 COLL VENOUS BLD VENIPUNCTURE: CPT | Performed by: INTERNAL MEDICINE

## 2017-02-26 PROCEDURE — 00000146 ZZHCL STATISTIC GLUCOSE BY METER IP

## 2017-02-26 PROCEDURE — 12000001 ZZH R&B MED SURG/OB UMMC

## 2017-02-26 PROCEDURE — 25000132 ZZH RX MED GY IP 250 OP 250 PS 637: Performed by: INTERNAL MEDICINE

## 2017-02-26 PROCEDURE — 99233 SBSQ HOSP IP/OBS HIGH 50: CPT | Mod: GC | Performed by: PEDIATRICS

## 2017-02-26 PROCEDURE — 85610 PROTHROMBIN TIME: CPT | Performed by: INTERNAL MEDICINE

## 2017-02-26 PROCEDURE — 25000125 ZZHC RX 250: Performed by: INTERNAL MEDICINE

## 2017-02-26 PROCEDURE — 40000556 ZZH STATISTIC PERIPHERAL IV START W US GUIDANCE

## 2017-02-26 PROCEDURE — 80053 COMPREHEN METABOLIC PANEL: CPT | Performed by: INTERNAL MEDICINE

## 2017-02-26 PROCEDURE — 83735 ASSAY OF MAGNESIUM: CPT | Performed by: INTERNAL MEDICINE

## 2017-02-26 PROCEDURE — 25000132 ZZH RX MED GY IP 250 OP 250 PS 637: Performed by: STUDENT IN AN ORGANIZED HEALTH CARE EDUCATION/TRAINING PROGRAM

## 2017-02-26 PROCEDURE — 85027 COMPLETE CBC AUTOMATED: CPT | Performed by: INTERNAL MEDICINE

## 2017-02-26 RX ORDER — LIDOCAINE 40 MG/G
CREAM TOPICAL
Status: DISCONTINUED | OUTPATIENT
Start: 2017-02-26 | End: 2017-03-10 | Stop reason: HOSPADM

## 2017-02-26 RX ORDER — ACETAMINOPHEN 325 MG/1
325 TABLET ORAL EVERY 4 HOURS PRN
Status: DISCONTINUED | OUTPATIENT
Start: 2017-02-26 | End: 2017-03-04

## 2017-02-26 RX ORDER — LIDOCAINE 50 MG/G
1 PATCH TOPICAL
Status: DISCONTINUED | OUTPATIENT
Start: 2017-02-26 | End: 2017-03-05

## 2017-02-26 RX ORDER — LACTULOSE 10 G/15ML
20 SOLUTION ORAL 3 TIMES DAILY
Status: DISCONTINUED | OUTPATIENT
Start: 2017-02-27 | End: 2017-03-04

## 2017-02-26 RX ADMIN — LACTULOSE 10 G: 20 SOLUTION ORAL at 14:48

## 2017-02-26 RX ADMIN — LIDOCAINE 1 PATCH: 50 PATCH TOPICAL at 16:49

## 2017-02-26 RX ADMIN — LACTULOSE 20 G: 20 SOLUTION ORAL at 07:27

## 2017-02-26 RX ADMIN — LACTULOSE 10 G: 20 SOLUTION ORAL at 12:05

## 2017-02-26 RX ADMIN — LACTULOSE 20 G: 20 SOLUTION ORAL at 14:48

## 2017-02-26 RX ADMIN — RIFAXIMIN 550 MG: 550 TABLET ORAL at 20:26

## 2017-02-26 RX ADMIN — LACTULOSE 20 G: 20 SOLUTION ORAL at 05:09

## 2017-02-26 RX ADMIN — LACTULOSE 10 G: 20 SOLUTION ORAL at 20:27

## 2017-02-26 RX ADMIN — LIDOCAINE 1 PATCH: 50 PATCH TOPICAL at 13:41

## 2017-02-26 RX ADMIN — RIFAXIMIN 550 MG: 550 TABLET ORAL at 09:32

## 2017-02-26 RX ADMIN — OMEPRAZOLE 20 MG: 20 CAPSULE, DELAYED RELEASE ORAL at 09:31

## 2017-02-26 RX ADMIN — LACTULOSE 20 G: 20 SOLUTION ORAL at 09:31

## 2017-02-26 RX ADMIN — CIPROFLOXACIN HYDROCHLORIDE 500 MG: 250 TABLET, FILM COATED ORAL at 09:31

## 2017-02-26 RX ADMIN — FUROSEMIDE 40 MG: 40 TABLET ORAL at 09:31

## 2017-02-26 RX ADMIN — LACTULOSE 20 G: 20 SOLUTION ORAL at 12:06

## 2017-02-26 ASSESSMENT — PAIN DESCRIPTION - DESCRIPTORS
DESCRIPTORS: DISCOMFORT
DESCRIPTORS: ACHING
DESCRIPTORS: ACHING
DESCRIPTORS: DISCOMFORT

## 2017-02-26 NOTE — PROGRESS NOTES
[facility] 2  Daily Progress Note      Date of Admission: 2/21/2017  Date of Service: 02/26/2017    Camacho Bhagat MRN# [id number]   Age: 51 year old YOB: 1965     Interval History   More somnolent today. Per patient he had adequate stool output. However, unclear if these stools were documented by nursing.  Continues on the HE protocol.  Denies any pain, shortness of breath.  Feels tired but otherwise denies any other complaints.  No nausea or vomiting.  No fever or chills. Pulled his IV out last night- states it fell out.  Per nursing an episode of agitation overnight.      Medications     Medications and allergies reviewed in EPIC.  Changes are reflected in the assessment and plan.      Physical Exam   /48 (BP Location: Left arm)  Pulse 84  Temp 97.2  F (36.2  C) (Oral)  Resp 16  Ht 1.829 m (6')  Wt 107.8 kg (237 lb 11.2 oz)  SpO2 97%  BMI 32.24 kg/m2    Wt Readings from Last 1 Encounters:   02/25/17 107.8 kg (237 lb 11.2 oz)    Body mass index is 32.24 kg/(m^2).     Physical Exam   Constitutional: He is oriented to person, place, and time.   Somnolent, oriented to persona and place. No acute distress.     HENT:   Head: Normocephalic and atraumatic.   Eyes: EOM are normal. Pupils are equal, round, and reactive to light. Scleral icterus is present.   Neck: Neck supple.   Cardiovascular: Normal rate and regular rhythm.    Pulmonary/Chest: Effort normal and breath sounds normal. No respiratory distress. He has no wheezes.   Abdominal: Soft. Bowel sounds are normal. There is no tenderness. There is no rebound and no guarding.   Distended.  No fluid wave present.     Musculoskeletal: Normal range of motion. He exhibits no edema.   Neurological: He is alert and oriented to person, place, and time.   Skin: Skin is warm. No erythema.     Drains and Tubes: None.      Intravascular Access and Device: No PIV - pulled out.       Intake/Output Summary (Last 24 hours) at 02/26/17  1246  Last data filed at 02/26/17 0600   Gross per 24 hour   Intake              260 ml   Output              700 ml   Net             -440 ml     Data     Labs & Studies of Note: I personally reviewed the labs in [facility].      Notable for Cr of 1.3.  Na stable in the 120s  CBC stable    Imaging:   No results found for this or any previous visit (from the past 24 hour(s)).      Main Plans for Today   Continue scheduled lactulose.    - Monitor for worsening hepatic encephalopathy - would recommend enemas - if the patient refuses.      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 Now admitted again due to concern for worsening encephalopathy and infection.       Hepatic Encephalopathy: Likely 2/2 to medication non compliance.Unable to obtain diagnostic paracentesis on admission due to very limited fluid pocket following therapeutic tap prior to arrival. Started empirically on Zosyn due to concern for SBP and ?aspiration pneumonia on CXR. Head CT on admission was negative for any acute intracranial process.   - Diagnostic para 02/23 negative for SBP. Low suspicion for infectious etiology as the trigger for HE.   - Lactulose 3 times daily - PO. Titrate for 3-5 stools per day.   - Rifaximin 550 mg bid  - PRN Lactulose PO vs enemas per mental status.   - Nursing to continue HE protocol. He appears more somnolent today - nursing continuing to encourage lactulose compliance      Cryptogenic cirrhosis/CASTAÑEDA  HCC S/P TACE 09/2016:   Recurrent Ascites/SBP:   Unclear if this is an infectious process vs. Non compliance as listed above. Continues to have worsening bilirubin.  - D/C Zosyn 02/24 as there is no clear sign of infection. Switch to Cipro ppx 500 mg daily.   - Diagnostic paracentesis 02/23 - negative for SBP  -  Remainder of infectious work up. UA no infectious signs. CXR ? Aspiration pneumonia - however no symptoms suggestive of pneumonia. Blood cultures with no growth to date.   - Hepatology consulted - appreciate recs.   - Transplant candidacy- awaiting listing for transplant due to high MELD. Right heart cath 2/24 - with increased right and left sided filling pressures - however no contraindication to transplant aside from diuresis now for volume overload.  - MRI abdomen w/wo contrast pending improvement of creatinine to evaluate for recurrence of HCC       Acute Kidney Injury:   Hyponatremia(hypervolemic):   Hyperkalemia:   : Pt with recurrent issues with hyperkalemia - thought to be related to his ACEI and Aldactone - however he has been discontinued on this medication on recent discharges. K was 6 on admission - now improved.   - EJ likely 2/2 to intravascular volume depletion. 6.1 liter paracentesis on 2/21.   - RHC on 2/24 with increased filling pressures.   - Continue home Lasix 40 mg PO.   - Trend Cr. Stable. MRI pending improvement of Creatinine.  - Strict I/Os. Daily weights.       Urinary retention  H/O Scrotal edema - Has been recent evaluated by urology. Intermittently has needed a vazquez catheter due to retention on recent admissions. Recurrent issue again requiring vazquez catheter placement on admit. Evaluated for a scrotal mass on last admit - signs of possible inflammatory changes without a drainable fluid collection.   - Voiding without difficulty after vazquez removal.   - Post void residual PRN to ensure he is not retaining.       Type II DM: SSI. Lantus 60 units daily. Bloods sugars have been elevated - due to decreasing lantus for procedure. Resume home insulin.  -Started insulin aspart subq 2 units/15 g carbs  -Changed to high dose ISS  -Hypoglycemia protocol  - Mother requesting an endocrine consult to assist with outpt regimen.  Will order tomorrow.         ------------------------------------------------------------------------------------------------------------------  Prophylaxis:   -GI: PPI  -DVT: None due to bleeding risk.       FEN: Mod carbohydrate diet -Nutrition consulted to help with dietary rec given recurrent hyperkalemia.   Consults: Hepatology, dietician, Cardiology.   Family: Mother updated at bedside 2/26. Further updates PRN  Code status: Full code.   Disposition: Pending further transplant work up, improvement of kidney function, and further eval of worsening liver disease.   =========================================================  Patient was discussed and evaluated with Dr. Mauro Martin MD   IM PGY2  [phone]

## 2017-02-26 NOTE — PLAN OF CARE
Problem: Goal Outcome Summary  Goal: Goal Outcome Summary  Outcome: Declining  VSS. Pt requested 10g Lactulose at 2255 since he missed 2 doses earlier today due to loose stools. When awakened for 0400 VS pt was agitated and had pulled out his IV. Per Lactulose protocol pt was stage 2 so 20g given po at 0510; will pass along to next nurse that pt needs 2 more doses at 0710 and 0910. MD notified. Pt continues to have c/o pain to hands which is chronic; PO tylenol helped but pt was hoping for Oxycodone which is more effective. Pt refused to use urinal this am; no stools noted this shift. Bed alarm activated due to change in mentation. PLAN: vascular access to place another IV, administer Lactulose per protocol x2 and continue to monitor.

## 2017-02-26 NOTE — PLAN OF CARE
"Problem: Goal Outcome Summary  Goal: Goal Outcome Summary  Outcome: No Change  VSS. C/o of chronic bilateral hand pain and abdominal pain. MD notified. Received order for prn 325 mg tylenol and lidoderm patches. Patient delines tylenol. Up with assist. Lethargic at start of the shift. Patient PIV found on floor this morning. New PIV replaced and netting placed over PIV to prevent from being dislodged. Patient received prn lactulose doses x4. Encephalopathy protocol restarted at 1200. MD aware. Patient to get another prn dose of lactulose at 1640. Mentation seems to be a little more clear in comparison to this morning. Patient still feels \" fuzzy.\" Will continue to monitor for s/s of altered mental status.Bed alarm on for safety. Voiding spontaneously. Patient had one small stool his morning.  Patient's mom visited this morning. BG covered per sliding scale. Patient has yet to eat a meal today. Resting in between cares. PLAN: Monitor for changes in mentation and monitor BG.       "

## 2017-02-26 NOTE — PLAN OF CARE
Problem: Goal Outcome Summary  Goal: Goal Outcome Summary  Outcome: No Change  VSS. Pt transferred to unit from 6B @ 2000. Pt up with SBA. Insulin given per sliding scale. Pt has had multiple loose stools today from Lactulose prep. Denies pain. CPAP on at night. Cont. POC.

## 2017-02-27 LAB
ALBUMIN SERPL-MCNC: 2.7 G/DL (ref 3.4–5)
ALBUMIN UR-MCNC: NEGATIVE MG/DL
ALP SERPL-CCNC: 248 U/L (ref 40–150)
ALT SERPL W P-5'-P-CCNC: 83 U/L (ref 0–70)
ANION GAP SERPL CALCULATED.3IONS-SCNC: 13 MMOL/L (ref 3–14)
ANION GAP SERPL CALCULATED.3IONS-SCNC: 14 MMOL/L (ref 3–14)
APPEARANCE FLD: CLEAR
APPEARANCE UR: ABNORMAL
AST SERPL W P-5'-P-CCNC: 137 U/L (ref 0–45)
BACTERIA SPEC CULT: NO GROWTH
BACTERIA SPEC CULT: NO GROWTH
BILIRUB SERPL-MCNC: 37.9 MG/DL (ref 0.2–1.3)
BILIRUB UR QL STRIP: ABNORMAL
BUN SERPL-MCNC: 67 MG/DL (ref 7–30)
BUN SERPL-MCNC: 76 MG/DL (ref 7–30)
CALCIUM SERPL-MCNC: 8.5 MG/DL (ref 8.5–10.1)
CALCIUM SERPL-MCNC: 8.6 MG/DL (ref 8.5–10.1)
CHLORIDE SERPL-SCNC: 91 MMOL/L (ref 94–109)
CHLORIDE SERPL-SCNC: 92 MMOL/L (ref 94–109)
CO2 SERPL-SCNC: 17 MMOL/L (ref 20–32)
CO2 SERPL-SCNC: 18 MMOL/L (ref 20–32)
COLOR FLD: YELLOW
COLOR UR AUTO: ABNORMAL
CREAT SERPL-MCNC: 2.29 MG/DL (ref 0.66–1.25)
CREAT SERPL-MCNC: 2.4 MG/DL (ref 0.66–1.25)
ERYTHROCYTE [DISTWIDTH] IN BLOOD BY AUTOMATED COUNT: 15.5 % (ref 10–15)
GFR SERPL CREATININE-BSD FRML MDRD: 29 ML/MIN/1.7M2
GFR SERPL CREATININE-BSD FRML MDRD: 30 ML/MIN/1.7M2
GLUCOSE BLDC GLUCOMTR-MCNC: 151 MG/DL (ref 70–99)
GLUCOSE BLDC GLUCOMTR-MCNC: 153 MG/DL (ref 70–99)
GLUCOSE BLDC GLUCOMTR-MCNC: 206 MG/DL (ref 70–99)
GLUCOSE BLDC GLUCOMTR-MCNC: 301 MG/DL (ref 70–99)
GLUCOSE BLDC GLUCOMTR-MCNC: 374 MG/DL (ref 70–99)
GLUCOSE SERPL-MCNC: 334 MG/DL (ref 70–99)
GLUCOSE SERPL-MCNC: 354 MG/DL (ref 70–99)
GLUCOSE UR STRIP-MCNC: 300 MG/DL
GRAM STN SPEC: NORMAL
HCT VFR BLD AUTO: 30.4 % (ref 40–53)
HGB BLD-MCNC: 11 G/DL (ref 13.3–17.7)
HGB UR QL STRIP: NEGATIVE
INR PPP: 1.61 (ref 0.86–1.14)
KETONES UR STRIP-MCNC: NEGATIVE MG/DL
LEUKOCYTE ESTERASE UR QL STRIP: NEGATIVE
LYMPHOCYTES NFR FLD MANUAL: 27 %
MAGNESIUM SERPL-MCNC: 2.5 MG/DL (ref 1.6–2.3)
MCH RBC QN AUTO: 34.2 PG (ref 26.5–33)
MCHC RBC AUTO-ENTMCNC: 36.2 G/DL (ref 31.5–36.5)
MCV RBC AUTO: 94 FL (ref 78–100)
MICRO REPORT STATUS: NORMAL
NEUTS BAND NFR FLD MANUAL: 39 %
NITRATE UR QL: NEGATIVE
OTHER CELLS FLD MANUAL: 34 %
PH UR STRIP: 5 PH (ref 5–7)
PLATELET # BLD AUTO: 55 10E9/L (ref 150–450)
POTASSIUM SERPL-SCNC: 4.2 MMOL/L (ref 3.4–5.3)
POTASSIUM SERPL-SCNC: 4.6 MMOL/L (ref 3.4–5.3)
PROT SERPL-MCNC: 4.7 G/DL (ref 6.8–8.8)
RBC # BLD AUTO: 3.22 10E12/L (ref 4.4–5.9)
RBC # FLD: NORMAL /UL
RBC #/AREA URNS AUTO: 2 /HPF (ref 0–2)
SODIUM SERPL-SCNC: 122 MMOL/L (ref 133–144)
SODIUM SERPL-SCNC: 123 MMOL/L (ref 133–144)
SP GR UR STRIP: 1.01 (ref 1–1.03)
SPECIMEN SOURCE FLD: NORMAL
SPECIMEN SOURCE: NORMAL
SQUAMOUS #/AREA URNS AUTO: 1 /HPF (ref 0–1)
URN SPEC COLLECT METH UR: ABNORMAL
UROBILINOGEN UR STRIP-MCNC: 2 MG/DL (ref 0–2)
WBC # BLD AUTO: 7.4 10E9/L (ref 4–11)
WBC # FLD AUTO: 85 /UL
WBC #/AREA URNS AUTO: 5 /HPF (ref 0–2)

## 2017-02-27 PROCEDURE — 25000128 H RX IP 250 OP 636: Performed by: INTERNAL MEDICINE

## 2017-02-27 PROCEDURE — 85027 COMPLETE CBC AUTOMATED: CPT | Performed by: INTERNAL MEDICINE

## 2017-02-27 PROCEDURE — P9047 ALBUMIN (HUMAN), 25%, 50ML: HCPCS | Performed by: STUDENT IN AN ORGANIZED HEALTH CARE EDUCATION/TRAINING PROGRAM

## 2017-02-27 PROCEDURE — 89051 BODY FLUID CELL COUNT: CPT | Performed by: INTERNAL MEDICINE

## 2017-02-27 PROCEDURE — 87205 SMEAR GRAM STAIN: CPT | Performed by: INTERNAL MEDICINE

## 2017-02-27 PROCEDURE — 25000128 H RX IP 250 OP 636: Performed by: STUDENT IN AN ORGANIZED HEALTH CARE EDUCATION/TRAINING PROGRAM

## 2017-02-27 PROCEDURE — 83735 ASSAY OF MAGNESIUM: CPT | Performed by: INTERNAL MEDICINE

## 2017-02-27 PROCEDURE — 25000132 ZZH RX MED GY IP 250 OP 250 PS 637: Performed by: INTERNAL MEDICINE

## 2017-02-27 PROCEDURE — 40000556 ZZH STATISTIC PERIPHERAL IV START W US GUIDANCE

## 2017-02-27 PROCEDURE — P9047 ALBUMIN (HUMAN), 25%, 50ML: HCPCS | Performed by: INTERNAL MEDICINE

## 2017-02-27 PROCEDURE — 40000141 ZZH STATISTIC PERIPHERAL IV START W/O US GUIDANCE

## 2017-02-27 PROCEDURE — 25000125 ZZHC RX 250: Performed by: CLINICAL NURSE SPECIALIST

## 2017-02-27 PROCEDURE — 80053 COMPREHEN METABOLIC PANEL: CPT | Performed by: INTERNAL MEDICINE

## 2017-02-27 PROCEDURE — 00000146 ZZHCL STATISTIC GLUCOSE BY METER IP

## 2017-02-27 PROCEDURE — 49082 ABD PARACENTESIS: CPT | Performed by: HOSPITALIST

## 2017-02-27 PROCEDURE — 80048 BASIC METABOLIC PNL TOTAL CA: CPT | Performed by: INTERNAL MEDICINE

## 2017-02-27 PROCEDURE — 25000125 ZZHC RX 250: Performed by: INTERNAL MEDICINE

## 2017-02-27 PROCEDURE — 25000132 ZZH RX MED GY IP 250 OP 250 PS 637: Performed by: STUDENT IN AN ORGANIZED HEALTH CARE EDUCATION/TRAINING PROGRAM

## 2017-02-27 PROCEDURE — 85610 PROTHROMBIN TIME: CPT | Performed by: INTERNAL MEDICINE

## 2017-02-27 PROCEDURE — 12000003 ZZH R&B CRITICAL UMMC

## 2017-02-27 PROCEDURE — 36415 COLL VENOUS BLD VENIPUNCTURE: CPT | Performed by: INTERNAL MEDICINE

## 2017-02-27 PROCEDURE — 81001 URINALYSIS AUTO W/SCOPE: CPT | Performed by: INTERNAL MEDICINE

## 2017-02-27 PROCEDURE — 87070 CULTURE OTHR SPECIMN AEROBIC: CPT | Performed by: INTERNAL MEDICINE

## 2017-02-27 PROCEDURE — 0W9G3ZZ DRAINAGE OF PERITONEAL CAVITY, PERCUTANEOUS APPROACH: ICD-10-PCS | Performed by: HOSPITALIST

## 2017-02-27 PROCEDURE — 99233 SBSQ HOSP IP/OBS HIGH 50: CPT | Mod: GC | Performed by: PEDIATRICS

## 2017-02-27 RX ORDER — MICONAZOLE NITRATE 20 MG/G
CREAM TOPICAL 2 TIMES DAILY
Status: DISCONTINUED | OUTPATIENT
Start: 2017-02-27 | End: 2017-03-10 | Stop reason: HOSPADM

## 2017-02-27 RX ORDER — ALBUMIN (HUMAN) 12.5 G/50ML
50 SOLUTION INTRAVENOUS ONCE
Status: COMPLETED | OUTPATIENT
Start: 2017-02-27 | End: 2017-02-27

## 2017-02-27 RX ORDER — ALBUMIN (HUMAN) 12.5 G/50ML
25 SOLUTION INTRAVENOUS ONCE
Status: COMPLETED | OUTPATIENT
Start: 2017-02-27 | End: 2017-02-27

## 2017-02-27 RX ORDER — HYDROMORPHONE HYDROCHLORIDE 1 MG/ML
INJECTION, SOLUTION INTRAMUSCULAR; INTRAVENOUS; SUBCUTANEOUS
Status: DISCONTINUED
Start: 2017-02-27 | End: 2017-02-27 | Stop reason: WASHOUT

## 2017-02-27 RX ADMIN — LACTULOSE 20 G: 20 SOLUTION ORAL at 20:15

## 2017-02-27 RX ADMIN — LACTULOSE 20 G: 20 SOLUTION ORAL at 12:40

## 2017-02-27 RX ADMIN — ALBUMIN (HUMAN) 25 G: 12.5 SOLUTION INTRAVENOUS at 18:39

## 2017-02-27 RX ADMIN — RIFAXIMIN 550 MG: 550 TABLET ORAL at 09:10

## 2017-02-27 RX ADMIN — LACTULOSE 20 G: 20 SOLUTION ORAL at 18:29

## 2017-02-27 RX ADMIN — RIFAXIMIN 550 MG: 550 TABLET ORAL at 20:15

## 2017-02-27 RX ADMIN — HUMAN INSULIN 1 UNITS/HR: 100 INJECTION, SOLUTION SUBCUTANEOUS at 23:50

## 2017-02-27 RX ADMIN — LACTULOSE 20 G: 20 SOLUTION ORAL at 09:06

## 2017-02-27 RX ADMIN — CIPROFLOXACIN HYDROCHLORIDE 500 MG: 250 TABLET, FILM COATED ORAL at 09:09

## 2017-02-27 RX ADMIN — LACTULOSE 20 G: 20 SOLUTION ORAL at 09:08

## 2017-02-27 RX ADMIN — FUROSEMIDE 40 MG: 40 TABLET ORAL at 09:10

## 2017-02-27 RX ADMIN — OMEPRAZOLE 20 MG: 20 CAPSULE, DELAYED RELEASE ORAL at 09:09

## 2017-02-27 RX ADMIN — MICONAZOLE NITRATE: 20 CREAM TOPICAL at 20:21

## 2017-02-27 RX ADMIN — ALBUMIN (HUMAN) 50 G: 12.5 SOLUTION INTRAVENOUS at 16:15

## 2017-02-27 RX ADMIN — MICONAZOLE NITRATE: 2 POWDER TOPICAL at 20:20

## 2017-02-27 RX ADMIN — HUMAN INSULIN 3.5 UNITS/HR: 100 INJECTION, SOLUTION SUBCUTANEOUS at 17:51

## 2017-02-27 RX ADMIN — HUMAN INSULIN 3 UNITS/HR: 100 INJECTION, SOLUTION SUBCUTANEOUS at 22:13

## 2017-02-27 ASSESSMENT — PAIN DESCRIPTION - DESCRIPTORS
DESCRIPTORS: DISCOMFORT
DESCRIPTORS: DISCOMFORT

## 2017-02-27 NOTE — PROCEDURES
Procedure Note  The risks and benefits of the procedure were explained to the patient, who expressed understanding and opted to proceed.  Consent was obtained and placed in the chart.  A time out was performed.  An area of ascites was located using ultrasound and marked in the left lower quadrant; the area was prepped and draped in the usual sterile fashion.  8 ml of 1% lidocaine was instilled and ascites located. The patient then rolled onto his left side, disrupting the sterile field. The procedure was then restarted, with the ascites again located using ultrasound and repeat prepping in a sterile fashion. There was initially difficulty in advancing the catheter; an incision was made with an 11 blade. A 5 Fr paracentesis catheter and needle were then inserted until ascites obtained. The needle was then removed.  The apparatus was connected to vacuum bottles and a total of 4000 ml removed.   The catheter was withdrawn and the area dressed.    Patient tolerated the procedure well. There was no evidence of leakage from the incision site 45 minutes after the procedure.     The procedure was performed jointly by Dr. Valentina Coleman and myself. I was present throughout.    Location: LLQ  Total volume removed: 4 liters  Fluid sent to the lab:  Yes    Nolan Mackey M.D.  Attending Physician  [facility]  Bedside Procedures Service  [phone]  DOS:  February 27, 2017

## 2017-02-27 NOTE — PLAN OF CARE
Problem: Goal Outcome Summary  Goal: Goal Outcome Summary  Outcome: No Change  Patient denies pain, yet is sensitive to insulin needles, touch, etc, announce all actions.  Irritatable, although A & O x 4, but per wife, he increased yellow, repeating sentences means he is increased HE.  Marginal urine volumes, icteric, tea colored, ua sent w/ miroscopic, awaiting results.  Abdomen very distended, sterile tap @ 1430 hrs, continue to monitor.  Blood sugars now below 300 team notified, Endocrine consult, monitor blood sugars, w/ eating tray was treated with 27 units novalog (Checked w/ Janice (PA-Endocrine)).  + 1 edema bilateral feet.  Has received 80 grams of lactulose with 1 small bm @ 1300 hrs, so will need to restart once tap is done goal is 3-4 bms daily (had 6 yesterday).  Must save per team, all urine, patient is also known to retain urine, bladder scan @ 12 noon was 367, patient able to void 150 continue to monitor.  Dr Huddleston/team aware of fs 122, they have assured this RN they are monitoring closely and possibly tap will resolve that level some.  P: Lactulose both scheduled and prn, fluid restriction 2000, pain control, BED ALARM, FALL PRECAUTIONS.

## 2017-02-27 NOTE — PROVIDER NOTIFICATION
DR Huddleston   Notified    Blood sugars in 300s despite coverage and lantus increase  Patient repeating sentences, wife says this is increase HE  , wife is here  P:  Endrocrin consult, possible tap might somewhat correct NA continue to montior/assess

## 2017-02-27 NOTE — PROGRESS NOTES
[facility] 2  Daily Progress Note    Date of Admission: 2/21/2017  Date of Service: 02/27/2017    Camacho Bhagat MRN# [id number]   Age: 51 year old YOB: 1965     Interval History     Continues to be sleepy.  Complaining of increased abdominal pain with insulin shots this AM.  Denies any shortness of breath, fever or chills.  Complaining of scrotal irritation. Some difficulty voiding - but not retaining on bladder scan.       Medications     Medications and allergies reviewed in [facility].  Changes are reflected in the assessment and plan.      Physical Exam   /58 (BP Location: Left arm)  Pulse 68  Temp 97.6  F (36.4  C) (Oral)  Resp 16  Ht 1.829 m (6')  Wt 107.8 kg (237 lb 11.2 oz)  SpO2 96%  BMI 32.24 kg/m2    Wt Readings from Last 1 Encounters:   02/25/17 107.8 kg (237 lb 11.2 oz)    Body mass index is 32.24 kg/(m^2).     Physical Exam   Constitutional: He is oriented to person, place, and time.   Alert, awake and oriented x 3.  No acute distress. More alert this afternoon.     HENT:   Head: Normocephalic and atraumatic.   Mouth/Throat: Oropharynx is clear and moist.   Eyes: EOM are normal. Pupils are equal, round, and reactive to light. Scleral icterus is present.   Neck: Normal range of motion. Neck supple.   Cardiovascular: Normal rate and regular rhythm.    Systolic murmurs over bilateral upper sternal borders.     Pulmonary/Chest: Breath sounds normal. No respiratory distress. He has no wheezes. He has no rales.   Abdominal: He exhibits distension.   Minimal diffuse tenderness.  No rebound or guarding. Fluid wave present.     Genitourinary:   Genitourinary Comments: Mild scrotal erythema with minimal edema.  No tenderness to palpation.     Musculoskeletal: Normal range of motion.   Trace lower extremity edema.     Neurological: He is alert and oriented to person, place, and time. No cranial nerve deficit.   Skin: Skin is warm. No rash noted. No erythema.       Drains  and Tubes: None.      Intravascular Access and Device: Peripheral IV.        Intake/Output Summary (Last 24 hours) at 02/27/17 0720  Last data filed at 02/27/17 0600   Gross per 24 hour   Intake             1320 ml   Output                0 ml   Net             1320 ml     Data     Labs & Studies of Note: I personally reviewed the relevant labs in [facility].      Notable for Cr increase to 2.4 from 1.3 yesterday.    CBC stable from yesterday.        Imaging:   No results found for this or any previous visit (from the past 24 hour(s)).      Main Plans for Today   - Diagnostic and Therapeutic paracentesis.    - Monitor kidney function.    - Hepatology following.      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 Now admitted again due to concern for worsening encephalopathy and infection.       Hepatic Encephalopathy: Likely 2/2 to medication non compliance.Unable to obtain diagnostic paracentesis on admission due to very limited fluid pocket following therapeutic tap prior to arrival. Started empirically on Zosyn due to concern for SBP and ?aspiration pneumonia on CXR. Head CT on admission was negative for any acute intracranial process.   - Diagnostic para 02/23 negative for SBP.  - Lactulose 3 times daily - PO. Titrate for 3-5 stools per day.   - Rifaximin 550 mg bid  - PRN Lactulose PO vs enemas per mental status.   - Nursing to continue HE protocol. He continues to have somonlence - nursing continuing to encourage lactulose compliance      Cryptogenic cirrhosis/CASTAÑEDA  HCC S/P TACE 09/2016:   Recurrent Ascites/SBP:   - D/C Zosyn 02/24 as there is no clear sign of infection. Switched to Cipro ppx 500 mg daily.   - Diagnostic paracentesis 02/23 - negative for SBP  - Concern for SBP on 2/27 - diagnostic/therapeutic  para pending.    - UA 2/27 pending.    - Hepatology consulted - appreciate recs.   - Transplant candidacy- awaiting listing for transplant due to high MELD. Right heart cath 2/24 - with increased right and left sided filling pressures - however no contraindication to transplant aside from diuresis now for volume overload.  - MRI abdomen w/wo contrast pending improvement of creatinine to evaluate for recurrence of HCC       Acute Kidney Injury:   Hyponatremia(hypervolemic):   Hyperkalemia:   : Pt with recurrent issues with hyperkalemia - thought to be related to his ACEI and Aldactone - however he has been discontinued on this medication on recent discharges. K was 6 on admission - now improved.   - EJ likely 2/2 to intravascular volume depletion. 6.1 liter paracentesis on 2/21.   - RHC on 2/24 with increased filling pressures.   - Now with rise in creatinine to 2.4 - DDx includes obstruction given urinary retention history, pre-renal, or possible abx related toxicity  - Bladder scan and monitor post void residuals.    - UA pending.    - Trend Cr. MRI pending improvement of Creatinine.  - Strict I/Os. Daily weights.   - Consider nephrology consult in the AM if creatinine continues to worsen.        Urinary retention  H/O Scrotal edema - Has been recent evaluated by urology. Intermittently has needed a vazquez catheter due to retention on recent admissions. Recurrent issue again requiring vazquez catheter placement on admit. Evaluated for a scrotal mass on last admit - signs of possible inflammatory changes without a drainable fluid collection.   - Recurrent scrotal irritation - no signs of infection - topical antifungals PRN  - Monitor for difficulty voiding - may require vazquez catheter.    - Post void residual PRN to ensure he is not retaining.       Type II DM: SSI. Lantus 70 units daily. Bloods sugars have been elevated throughout hospital stay.    - Endocrine consulted 2/27- mother and pt's wife's are concerned with  his outpt regimen - [facility]  Consult for inpatient management and to establish outpt plan.   -Started insulin aspart subq 2 units/15 g carbs  -Changed to high dose ISS  - Hypoglycemia protocol    ------------------------------------------------------------------------------------------------------------------  Prophylaxis:   -GI: PPI  -DVT: None due to bleeding risk.       FEN: Mod carbohydrate diet - Nutrition consulted to help with dietary rec given recurrent hyperkalemia.   Consults: Hepatology, dietician, Cardiology.   Family: Mother updated at bedside 2/26. Further updates PRN  Code status: Full code.   Disposition: Pending further transplant work up, improvement of kidney function, and further eval of worsening liver disease.   =========================================================  Patient was discussed and evaluated with Dr. Mauro Martin MD   IM PGY2  [phone]

## 2017-02-27 NOTE — CONSULTS
Diabetes/Hyperglycemia Management Consult    Chief Complaint hyperglycemia and family request  Consult requested by: Dr. Martin, Medicine    History of Present Illness:  Mitch Bhagat is a 51 year old with a history of type 2 diabetes cryptogenic (possibly CASTAÑEDA) cirrhosis s/p successful chemoemoblization of an HCC lesion.  He has been listed for transplant, but is inactive.  He has been repeatedly hospitalized for spontaneous bacterial peritonitis.   He was admitted to [facility] 2/21/2017 with worsening encephalopathy and concern for infection.  Pt had been recently admitted under observation at [facility] (2/14-2/16).  There his hyperglycemia was briefly treated with IV insulin, then he was discharged on glargine 65 units every morning and lispro 20 units per meal.  His lowest glucose was in the high 100s and did have post-prandial hyperglycemia to 300s.  On admission here 2/21, glucose in 400s.  This was treated with aspart 10 units x3 and glucose improved to 130s.  Glargine was restarted at 20 units the next day due to NPO status.  BG then to 300s +.  Glargine increased to 35 units, then to 60 units daily starting 2/25.  Carbohydrate coverage started 2/23.  With fairly consistent carb coverage 2/24, BG improved to a low of 230s.  Yesterday, pt received no aspart for carbohydrate coverage-- Pt's wife believes he ate one meal late afternoon. Glucose persisting 300s today.  There is concern for SBP again and will have diagnostic in addition to therapeutic paracentesis this afternoon.  Creatinine has jumped to 2.4 today while it was 1.3-1.4 lately.  Pt is known to our service from previous hospitalizations, last seen in December.  In December he was transitioned from U500 to basal bolus insulin regimen due to concern that encephalopathy and high concentration insulin would raise risk for dangerous hypoglycemia at home.  Camacho was displeased to have providers unknown to him involved in his diabetes management and was  "displeased to have his U500 stopped.  His wife and mother have expressed concern over outpatient diabetes management, though Camacho continues to hold his endocrinologist in high esteem.  Wife states outpt endo was able to achieve a good regimen for Camacho, but that was prior to his more recent health decline.  She says Camacho is still not on board with transferring diabetes care to [facility], but that for consistency in care she hopes he will eventually be convinced.  Post-paracentesis, Camacho complained of some discomfort to his nursing staff but did not want to move.  He was unable to answer questions about his diabetes.      Recent Labs  Lab 02/27/17  0940 02/27/17  0856 02/26/17  1731 02/26/17  1132 02/26/17  0930 02/26/17  0802 02/25/17  2155 02/25/17  1808  02/25/17  0707  02/24/17  1534  02/24/17  0454  02/23/17  0648   *  --   --   --  376*  --   --   --   --  382*  --  251*  --  320*  --  285*   BGM  --  301* 362* 332*  --  341* 313* 357*  < >  --   < > 234*  < >  --   < >  --    < > = values in this interval not displayed.      Diabetes Type: type 2 diabetes  Diabetes Duration: 16 years  Usual Diabetes Regimen: glargine 65 units daily, lispro 20 units per meal (per last discharge summary and H&P)    Ability to [place] Prescribed Regimen: impaired related to encephalopathy.  Camacho is home alone so was managing \"independently\"  Diabetes Control:   Lab Results   Component Value Date    A1C 9.4 02/16/2017    A1C 6.2 12/14/2016    A1C 6.1 10/27/2016    A1C 8.3 07/02/2012    A1C 8.5 07/31/2009       Able to Detect Hypoglycemia: was able in the past, impaired in setting of encephalopathy  Usual Diabetes Care Provider: Dr. Jameson Eckert, [facility]  Factors Impacting Glucose Control: morbid obesity, liver disease, kidney disease, infection, change in activity/nutrition      Review of Systems  10 point ROS completed with pertinent positives and negatives noted in the HPI    Past " medical, family and social histories are reviewed and updated.    Past Medical History  Past Medical History   Diagnosis Date     Acute venous embolism and thrombosis of other specified veins 11/01     Deep vein thrombophlebitis, filter placed     Cancer (H)      Depressive disorder, not elsewhere classified      Esophageal reflux      Fibromyalgia 1/2009     dx with Dr Benitez( Rheum)     Osteoarthritis      Other and unspecified hyperlipidemia      Other chronic nonalcoholic liver disease      Fatty liver      Other testicular hypofunction      Pneumonia, organism unspecified 10-01     Included ARDS, sepsis, and  acute renal failure; hospitalized     Rheumatoid arthritis(714.0)      Type II or unspecified type diabetes mellitus without mention of complication, not stated as uncontrolled 11/01     Managed by endocrinology     Unspecified essential hypertension 11/01     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     Unspecified sleep apnea      Uses BiPAP       Family History  Family History   Problem Relation Age of Onset     DIABETES Father      Hypertension Father      Substance Abuse Father      Arthritis Mother      CANCER Mother      Thyroid     Thyroid Disease Mother      Other Cancer Mother      Colon Cancer No family hx of      Hyperlipidemia No family hx of      Coronary Artery Disease No family hx of      CEREBROVASCULAR DISEASE No family hx of      Breast Cancer No family hx of      Prostate Cancer No family hx of        Social History  Social History     Social History     Marital Status:      Spouse Name: Danica     Number of Children: 1     Years of Education: N/A     Occupational History            Social History Main Topics     Smoking status: Former Smoker     Smokeless tobacco: Former User     Types: Chew     Quit date: 10/08/2015      Comment: Has used chewing tobacco since age 15 , chewed for 20yrs      Social History Narrative    Used to be an aircraft  /technician    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         Patient lives by himself.  His mother lives nearby.  His wife currently resides with her 88 year old mother.  Daughter is nearby also.        Physical Exam  Temp: 95.7  F (35.4  C) Temp  Min: 95.7  F (35.4  C)  Max: 98.6  F (37  C)  Resp: 16 Resp  Min: 16  Max: 20  SpO2: 97 % SpO2  Min: 96 %  Max: 99 %  Pulse: 63 Pulse  Min: 63  Max: 68  Heart Rate: 68 Heart Rate  Min: 67  Max: 68  BP: 125/55 Systolic (24hrs), Av , Min:125 , Max:178   Diastolic (24hrs), Av, Min:49, Max:78    General: middle aged man side-lying in bed, with abdomen pressed between rails  HEENT: oral mucous membranes slightly dry, scleral icterus  Lungs: unlabored respiration  ABD: large, protuberant, bandaged site from paracentesis clean/dry  Skin: pronounced jaundice  MSK: moving upper extremities freely  Lymp: trace LE edema  Mental status:  Alert.  Mumbling some yes/no.    Psych: calm, not easily irritated    Laboratory  Recent Labs   Lab Test  17   0940  17   0930   NA  122*  124*   POTASSIUM  4.6  4.8   CHLORIDE  91*  92*   CO2  18*  19*   ANIONGAP  13  14   GLC  334*  376*   BUN  67*  53*   CR  2.40*  1.37*   JACOB  8.6  8.5     CBC RESULTS:   Recent Labs   Lab Test  17   0940   WBC  7.4   RBC  3.22*   HGB  11.0*   HCT  30.4*   MCV  94   MCH  34.2*   MCHC  36.2   RDW  15.5*   PLT  55*     Liver Function Studies -   Recent Labs   Lab Test  17   0940   PROTTOTAL  4.7*   ALBUMIN  2.7*   BILITOTAL  37.9*   ALKPHOS  248*   AST  137*   ALT  83*       Active Medications  Current Facility-Administered Medications   Medication     insulin glargine (LANTUS) injection 70 Units     miconazole with skin protectant (LEXI ANTIFUNGAL) 2 % cream     miconazole (MICATIN; MICRO GUARD) 2 % powder     insulin aspart (NovoLOG) inj (RAPID ACTING)     insulin aspart (NovoLOG) inj (RAPID ACTING)     albumin human 25 % injection 50 g      lidocaine 1 % 1 mL     lidocaine (LMX4) kit     sodium chloride (PF) 0.9% PF flush 3 mL     sodium chloride (PF) 0.9% PF flush 3 mL     acetaminophen (TYLENOL) tablet 325 mg     lidocaine (LIDODERM) 5 % Patch 1 patch     lidocaine (LIDODERM) patch REMOVAL     lidocaine (LIDODERM) Patch in Place     lactulose (CHRONULAC) solution 20 g     ondansetron (ZOFRAN-ODT) ODT tab 4 mg     gadobutrol (GADAVIST) injection 10 mL     insulin aspart (NovoLOG) inj (RAPID ACTING)     rifaximin (XIFAXAN) tablet 550 mg     omeprazole (priLOSEC) CR capsule 20 mg     Daily 2 GRAM acetaminophen limit, unless fulminent liver failure or transaminases greater than or equal to 300 - 400, then none     lactulose (CHRONULAC) solution 20 g    Or     lactulose (CHRONULAC) solution for enema prep 100 g     naloxone (NARCAN) injection 0.1-0.4 mg     glucose 40 % gel 15-30 g    Or     dextrose 50 % injection 25-50 mL    Or     glucagon injection 1 mg     Facility-Administered Medications Ordered in Other Encounters   Medication     albumin human 25 % injection 12.5 g       Current Diet    Active Diet Order      NPO      Assessment    Mr. Bhagat is a 51 year old with uncontrolled type 2 diabetes and with end stage liver disease admitted to [facility] 2/21/2017 with worsening encephalopathy and concern for infection. He is experiencing persistent hyperglycemia in the setting of likely omitted aspart, building glucose toxicity, and potentially infection.  Aspart dosing for PO carbs increased, with no effect so far.  Given uncertain infection state and acutely worsened renal function, pt's needs are best met with IV insulin.    Plan  Start IV insulin infusion ASAP.  Aspart 4.5 units/carb unit to be given if pt takes PO.  Ensure should not be left at bedside for pt to sip unmonitored (needs carb coverage for all snacks/supplements).  Once glucose stabilizes near target, will transition back to glargine.  We will follow.  If patient is amenable, will refer for  outpatient diabetes follow up at [facility].    Janice Griffinpton APRN -[id number]  Diabetes Management Team job code: [id number]

## 2017-02-27 NOTE — PLAN OF CARE
Problem: Goal Outcome Summary  Goal: Goal Outcome Summary  Outcome: No Change  VSS. Pt denies c/o pain or N/V. Lido patch fell off. PIV to right arm SL. Pt tolerating consistent carb diet well; requested ensure clear to have at bedside. Lactulose scheduled dose increased to 20g 3x daily. No PRN lactulose or mentation changed noted tonight. Pt up with SBA. Skin jaundiced. PLAN: continue pain and symptom management.

## 2017-02-27 NOTE — PROGRESS NOTES
[facility]  HEPATOLOGY PROGRESS NOTE  Camacho Bhagat [id number]     SUBJECTIVE:  Complains of abdominal fullness and tenderness with any palpation. Orientated with discussion but irritable with questions. Per nursing, has not had BM yet this am. He has been able to walk with bathroom with a stand by assistance.     OBJECTIVE:  VS: /50 (BP Location: Left arm)  Pulse 70  Temp 97.4  F (36.3  C) (Axillary)  Resp 16  Ht 1.829 m (6')  Wt 107.8 kg (237 lb 11.2 oz)  SpO2 97%  BMI 32.24 kg/m2   General: In no acute distress, mild facial muscle wasting  Neuro: AOx3, No asterixis  HEENT:  Moderate scleral icterus  CV: S1/S2with gr 2/4 murmurs. Skin warm and dry  Lungs: clear to auscultation Respirations even and nonlabored on room air  Abd: Nontender, moderately distended  Extrem: Trace lower peripehral edema  Skin: Moderate jaundice    REVIEW OF LABORATORY, PATHOLOGY AND IMAGING RESULTS:  BMP    Recent Labs  Lab 02/27/17  0940 02/26/17  0930 02/25/17  0707 02/24/17  1534   * 124* 124* 125*   POTASSIUM 4.6 4.8 4.7 4.8   CHLORIDE 91* 92* 93* 94   JACOB 8.6 8.5 8.8 8.7   CO2 18* 19* 19* 20   BUN 67* 53* 42* 41*   CR 2.40* 1.37* 1.30* 1.47*   * 376* 382* 251*     CBC    Recent Labs  Lab 02/27/17  0940 02/26/17  0930 02/25/17  0707 02/24/17  0454   WBC 7.4 7.8 7.2 6.4   RBC 3.22* 3.07* 3.18* 2.98*   HGB 11.0* 10.6* 10.9* 10.2*   HCT 30.4* 29.4* 30.8* 28.9*   MCV 94 96 97 97   MCH 34.2* 34.5* 34.3* 34.2*   MCHC 36.2 36.1 35.4 35.3   RDW 15.5* 15.4* 15.4* 15.7*   PLT 55* 50* 41* 41*     INR    Recent Labs  Lab 02/27/17  0940 02/26/17  0930 02/25/17  0707 02/24/17  0454   INR 1.61* 1.58* 1.59* 1.65*     LFTs    Recent Labs  Lab 02/27/17  0940 02/26/17  0930 02/25/17  0707 02/24/17  0454   ALKPHOS 248* 280* 260* 222*   * 138* 147* 144*   ALT 83* 93* 98* 91*   BILITOTAL 37.9* 34.3* 36.4* 32.8*   PROTTOTAL 4.7* 4.9* 5.0* 4.9*   ALBUMIN 2.7* 2.6* 2.8* 2.6*      PANC    Recent Labs  Lab 02/21/17  1520   LIPASE  326      MELD-Na score: 36 at 2/27/2017  9:40 AM  MELD score: 34 at 2/27/2017  9:40 AM  Calculated from:  Serum Creatinine: 2.40 mg/dL at 2/27/2017  9:40 AM  Serum Sodium: 122 mmol/L (Rounded to 125) at 2/27/2017  9:40 AM  Total Bilirubin: 37.9 mg/dL at 2/27/2017  9:40 AM  INR(ratio): 1.61 at 2/27/2017  9:40 AM  Age: 51 years      Imaging Results:  MRI w/wo 1/6/17  IMPRESSION:  1. Cirrhotic appearance of the liver with evidence of portal  hypertension including splenomegaly and small-to-moderate ascites.  2. Hepatic segment 3 previously treated lesion. The overall treatment  zone size is decreased, though the degree of enhancing soft tissue  within the treatment zone is not substantially changed. This low-level  enhancing tissue continues to remain concerning for malignancy and  attention on short-term follow-up is needed. OPTN 5T.  3. Based on this exam alone, the patient is within Jorge Alberto criteria.    IMPRESSION:  Camacho Bhagat is a 51 year old male with a history of CASTAÑEDA cirrhosis, HCC s/p TACE/RFA, SBP, ascites, HE, HLD, HTN, and DM II who was admitted with evidence of HE. He is listed for liver transplant but currently on hold due to need for cardiology clearance. He has had mild improvement in his mentation and has been started on empiric treatment of SBP. His sodium level has improved with hydration.     RECOMMENDATIONS:   1. Hepatic encephalopathy  - continue lactulose, titrating for 3-5 stools per day  - rifaximin 550 mg BID  - infectious work up so far negative     2. Ascites  3. SBP hx  - diagnostic/therapeutic para today. Prior sample negative for SBP  - if negative, will need SBP ppx  - due to kidney function, limit para volume to 4 liters. Please give 75 gms today for Albumin  - May need more frequent small volume ricardo for comfort.      4. CASTAÑEDA cirrhosis  5. HCC  6. Transplant candidacy  - listed for transplant. Current MELD 36. RHC 2/24 with fluid volume overload. Cardiology clearance.   - kidney  function limiting being able to perform MRI- worsening bili can be related to liver failure. Evaluation of infection still in process.   - asked tx surgical team to see patient.      7. Hyponatremia  - Continue with fluid restriction    8. Malnutrition  - monitor calorie count, may need NG tube if not taking in calories.        Patient was discussed with attending physician, Dr. Mary    Thank you for the opportunity to be involved with  Camacho Bhagat [facility]. Please call with any questions or concerns.    Abigail Robison, LAMIN, CNP  [phone]

## 2017-02-27 NOTE — PLAN OF CARE
Problem: Goal Outcome Summary  Goal: Goal Outcome Summary  Outcome: No Change  Hypertensive but does not meet provider notification. OVSS. Pt has had multiple loose BM's. Lactulose given per orders. Pt took a shower. Voids spont. Tolerating diet. Insulin given per sliding scale. PIV saline locked. Cont. POC.

## 2017-02-28 ENCOUNTER — COMMITTEE REVIEW (OUTPATIENT)
Dept: TRANSPLANT | Facility: CLINIC | Age: 52
End: 2017-02-28

## 2017-02-28 LAB
ALBUMIN SERPL-MCNC: 3.1 G/DL (ref 3.4–5)
ALP SERPL-CCNC: 207 U/L (ref 40–150)
ALT SERPL W P-5'-P-CCNC: 86 U/L (ref 0–70)
AMMONIA PLAS-SCNC: NORMAL UMOL/L (ref 10–50)
ANION GAP SERPL CALCULATED.3IONS-SCNC: 14 MMOL/L (ref 3–14)
ANION GAP SERPL CALCULATED.3IONS-SCNC: 14 MMOL/L (ref 3–14)
AST SERPL W P-5'-P-CCNC: 135 U/L (ref 0–45)
BACTERIA SPEC CULT: NO GROWTH
BASOPHILS # BLD AUTO: 0 10E9/L (ref 0–0.2)
BASOPHILS NFR BLD AUTO: 0.3 %
BILIRUB SERPL-MCNC: 36.3 MG/DL (ref 0.2–1.3)
BUN SERPL-MCNC: 76 MG/DL (ref 7–30)
BUN SERPL-MCNC: 81 MG/DL (ref 7–30)
CALCIUM SERPL-MCNC: 8.8 MG/DL (ref 8.5–10.1)
CALCIUM SERPL-MCNC: 9.1 MG/DL (ref 8.5–10.1)
CHLORIDE SERPL-SCNC: 95 MMOL/L (ref 94–109)
CHLORIDE SERPL-SCNC: 96 MMOL/L (ref 94–109)
CO2 SERPL-SCNC: 17 MMOL/L (ref 20–32)
CO2 SERPL-SCNC: 18 MMOL/L (ref 20–32)
CREAT SERPL-MCNC: 2.71 MG/DL (ref 0.66–1.25)
CREAT SERPL-MCNC: 2.73 MG/DL (ref 0.66–1.25)
DIFFERENTIAL METHOD BLD: ABNORMAL
EOSINOPHIL # BLD AUTO: 0.2 10E9/L (ref 0–0.7)
EOSINOPHIL NFR BLD AUTO: 3.1 %
ERYTHROCYTE [DISTWIDTH] IN BLOOD BY AUTOMATED COUNT: 15.3 % (ref 10–15)
ERYTHROCYTE [DISTWIDTH] IN BLOOD BY AUTOMATED COUNT: 15.4 % (ref 10–15)
GFR SERPL CREATININE-BSD FRML MDRD: 25 ML/MIN/1.7M2
GFR SERPL CREATININE-BSD FRML MDRD: 25 ML/MIN/1.7M2
GLUCOSE BLDC GLUCOMTR-MCNC: 114 MG/DL (ref 70–99)
GLUCOSE BLDC GLUCOMTR-MCNC: 117 MG/DL (ref 70–99)
GLUCOSE BLDC GLUCOMTR-MCNC: 157 MG/DL (ref 70–99)
GLUCOSE BLDC GLUCOMTR-MCNC: 284 MG/DL (ref 70–99)
GLUCOSE BLDC GLUCOMTR-MCNC: 324 MG/DL (ref 70–99)
GLUCOSE SERPL-MCNC: 129 MG/DL (ref 70–99)
GLUCOSE SERPL-MCNC: 136 MG/DL (ref 70–99)
HCT VFR BLD AUTO: 28.5 % (ref 40–53)
HCT VFR BLD AUTO: 29 % (ref 40–53)
HGB BLD-MCNC: 10 G/DL (ref 13.3–17.7)
HGB BLD-MCNC: 10.5 G/DL (ref 13.3–17.7)
IMM GRANULOCYTES # BLD: 0 10E9/L (ref 0–0.4)
IMM GRANULOCYTES NFR BLD: 0.2 %
INR PPP: 1.61 (ref 0.86–1.14)
LYMPHOCYTES # BLD AUTO: 0.6 10E9/L (ref 0.8–5.3)
LYMPHOCYTES NFR BLD AUTO: 11 %
MCH RBC QN AUTO: 34.1 PG (ref 26.5–33)
MCH RBC QN AUTO: 34.4 PG (ref 26.5–33)
MCHC RBC AUTO-ENTMCNC: 35.1 G/DL (ref 31.5–36.5)
MCHC RBC AUTO-ENTMCNC: 36.2 G/DL (ref 31.5–36.5)
MCV RBC AUTO: 95 FL (ref 78–100)
MCV RBC AUTO: 97 FL (ref 78–100)
MICRO REPORT STATUS: NORMAL
MONOCYTES # BLD AUTO: 0.2 10E9/L (ref 0–1.3)
MONOCYTES NFR BLD AUTO: 3.5 %
NEUTROPHILS # BLD AUTO: 4.7 10E9/L (ref 1.6–8.3)
NEUTROPHILS NFR BLD AUTO: 81.9 %
NRBC # BLD AUTO: 0 10*3/UL
NRBC BLD AUTO-RTO: 0 /100
PLATELET # BLD AUTO: 43 10E9/L (ref 150–450)
PLATELET # BLD AUTO: 47 10E9/L (ref 150–450)
POTASSIUM SERPL-SCNC: 4 MMOL/L (ref 3.4–5.3)
POTASSIUM SERPL-SCNC: 4.1 MMOL/L (ref 3.4–5.3)
PROT SERPL-MCNC: 5.1 G/DL (ref 6.8–8.8)
RBC # BLD AUTO: 2.93 10E12/L (ref 4.4–5.9)
RBC # BLD AUTO: 3.05 10E12/L (ref 4.4–5.9)
SODIUM SERPL-SCNC: 126 MMOL/L (ref 133–144)
SODIUM SERPL-SCNC: 128 MMOL/L (ref 133–144)
SPECIMEN SOURCE: NORMAL
WBC # BLD AUTO: 5.6 10E9/L (ref 4–11)
WBC # BLD AUTO: 6.5 10E9/L (ref 4–11)

## 2017-02-28 PROCEDURE — P9047 ALBUMIN (HUMAN), 25%, 50ML: HCPCS | Performed by: INTERNAL MEDICINE

## 2017-02-28 PROCEDURE — 80053 COMPREHEN METABOLIC PANEL: CPT | Performed by: INTERNAL MEDICINE

## 2017-02-28 PROCEDURE — 12000006 ZZH R&B IMCU INTERMEDIATE UMMC

## 2017-02-28 PROCEDURE — 36415 COLL VENOUS BLD VENIPUNCTURE: CPT | Performed by: INTERNAL MEDICINE

## 2017-02-28 PROCEDURE — 25000132 ZZH RX MED GY IP 250 OP 250 PS 637: Performed by: INTERNAL MEDICINE

## 2017-02-28 PROCEDURE — 25000125 ZZHC RX 250: Performed by: INTERNAL MEDICINE

## 2017-02-28 PROCEDURE — 25000128 H RX IP 250 OP 636: Performed by: INTERNAL MEDICINE

## 2017-02-28 PROCEDURE — 99233 SBSQ HOSP IP/OBS HIGH 50: CPT | Mod: GC | Performed by: INTERNAL MEDICINE

## 2017-02-28 PROCEDURE — 82140 ASSAY OF AMMONIA: CPT | Performed by: INTERNAL MEDICINE

## 2017-02-28 PROCEDURE — 25000125 ZZHC RX 250: Performed by: CLINICAL NURSE SPECIALIST

## 2017-02-28 PROCEDURE — 85004 AUTOMATED DIFF WBC COUNT: CPT | Performed by: INTERNAL MEDICINE

## 2017-02-28 PROCEDURE — 40000556 ZZH STATISTIC PERIPHERAL IV START W US GUIDANCE

## 2017-02-28 PROCEDURE — 85610 PROTHROMBIN TIME: CPT | Performed by: INTERNAL MEDICINE

## 2017-02-28 PROCEDURE — 80048 BASIC METABOLIC PNL TOTAL CA: CPT | Performed by: INTERNAL MEDICINE

## 2017-02-28 PROCEDURE — 85027 COMPLETE CBC AUTOMATED: CPT | Performed by: INTERNAL MEDICINE

## 2017-02-28 RX ORDER — HYDROCODONE BITARTRATE AND ACETAMINOPHEN 5; 325 MG/1; MG/1
1 TABLET ORAL ONCE
Status: COMPLETED | OUTPATIENT
Start: 2017-02-28 | End: 2017-02-28

## 2017-02-28 RX ORDER — ALBUMIN (HUMAN) 12.5 G/50ML
75 SOLUTION INTRAVENOUS DAILY
Status: COMPLETED | OUTPATIENT
Start: 2017-02-28 | End: 2017-03-01

## 2017-02-28 RX ADMIN — HUMAN INSULIN 5.5 UNITS/HR: 100 INJECTION, SOLUTION SUBCUTANEOUS at 05:45

## 2017-02-28 RX ADMIN — ALBUMIN (HUMAN) 75 G: 12.5 SOLUTION INTRAVENOUS at 17:02

## 2017-02-28 RX ADMIN — HUMAN INSULIN 4 UNITS/HR: 100 INJECTION, SOLUTION SUBCUTANEOUS at 16:12

## 2017-02-28 RX ADMIN — OMEPRAZOLE 20 MG: 20 CAPSULE, DELAYED RELEASE ORAL at 08:05

## 2017-02-28 RX ADMIN — LACTULOSE 20 G: 20 SOLUTION ORAL at 14:23

## 2017-02-28 RX ADMIN — LACTULOSE 20 G: 20 SOLUTION ORAL at 16:20

## 2017-02-28 RX ADMIN — LACTULOSE 20 G: 20 SOLUTION ORAL at 12:16

## 2017-02-28 RX ADMIN — HUMAN INSULIN 6 UNITS/HR: 100 INJECTION, SOLUTION SUBCUTANEOUS at 17:04

## 2017-02-28 RX ADMIN — LACTULOSE 20 G: 20 SOLUTION ORAL at 10:22

## 2017-02-28 RX ADMIN — RIFAXIMIN 550 MG: 550 TABLET ORAL at 08:05

## 2017-02-28 RX ADMIN — HYDROCODONE BITARTRATE AND ACETAMINOPHEN 1 TABLET: 5; 325 TABLET ORAL at 06:02

## 2017-02-28 RX ADMIN — LACTULOSE 20 G: 20 SOLUTION ORAL at 08:05

## 2017-02-28 RX ADMIN — MICONAZOLE NITRATE: 20 CREAM TOPICAL at 08:11

## 2017-02-28 RX ADMIN — LACTULOSE 20 G: 20 SOLUTION ORAL at 20:38

## 2017-02-28 RX ADMIN — ACETAMINOPHEN 325 MG: 325 TABLET, FILM COATED ORAL at 03:17

## 2017-02-28 RX ADMIN — RIFAXIMIN 550 MG: 550 TABLET ORAL at 20:38

## 2017-02-28 RX ADMIN — ACETAMINOPHEN 325 MG: 325 TABLET, FILM COATED ORAL at 23:53

## 2017-02-28 ASSESSMENT — PAIN DESCRIPTION - DESCRIPTORS
DESCRIPTORS: PRESSURE
DESCRIPTORS: PRESSURE

## 2017-02-28 NOTE — LETTER
Camacho TERESA Bhagat  [address] [place] [id number]    February 28, 2017    Dear Camacho    This letter is being sent to notify you that your status was changed to active on the liver transplant waiting list at the [facility], on 02/28/2017.  Your status was changed because of your improved condition now meeting transplant criteria.    We would like the opportunity to discuss this decision with you.  If you are not scheduled for a return visit with your provider here, please call [phone] or [phone] to make an appointment.  We recommend that you continue to follow with your care providers for continued management of your liver disease.    Our program has physician and surgeon coverage 24 hours a day, 365 days a year.  If this coverage changes or there are substantial program changes, you will be notified in writing by letter.    You have been sent this letter to comply with the [employer] ([employer]) guidelines.    Finally, attached is a letter from the [employer] ([employer]).  It describes the services and information offered to patients by [employer] and the [employer] and Transplantation Network.    We appreciate having had the opportunity to participate in your care.  If you have questions, please feel free to call the Transplant Office at [phone] or call [phone] to schedule an appointment.    Sincerely,    Jameson Morales Jr., BSN, RN  Liver Transplant Coordinator  [phone]    Encl: [employer] Letter

## 2017-02-28 NOTE — PLAN OF CARE
Problem: Goal Outcome Summary  Goal: Goal Outcome Summary  Outcome: No Change  Bed alarm on for safety. VSS. A & O x4. Speech spontaneous. Somulent. Jaundice. Gave scheduled and PRN lactolose. Pt has had one BM today. Voiding spont, tea colored. On insulin gtt. Currently on algorithm 3. Gave carb coverage.      PLAN: need urine sample for multiple labs to be drawn- pt unable to void at this time. He stated he would try again later. Pt will need assistance.

## 2017-02-28 NOTE — PROGRESS NOTES
Care Coordinator- Assessment Note     Admission Date/Time:  2/21/2017  Attending MD:  Bessy Casanova DO     Data  Chart reviewed, discussed with interdisciplinary team.   Patient was admitted for:   1. Hepatic encephalopathy (H)    2. Hyperkalemia    3. Biliary cirrhosis (H)    4. Hyperammonemia (H)    5. Acute renal failure, unspecified acute renal failure type (H)    6. Altered mental status, unspecified altered mental status type    7. 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  Support System: Supportive  Services Involved: none at the time of admit  Transportation: Family or Friend will provide  Barriers to Discharge: medical plan of care    Patient continues to require medical treatment, anticipate discharge to home when medically appropriate.  RNCC will continue to follow as needs arise.     Plan  Anticipated Discharge Date:  TBD  Anticipated Discharge Plan:  Pending medical of care    CTS Handoff completed: jameel Hawkins RN, BSN, PHN, RNCCPH: [phone]  Pager: [phone]  Covering for : Desire Daley RNCC

## 2017-02-28 NOTE — PROGRESS NOTES
"[facility]  HEPATOLOGY PROGRESS NOTE  Camacho Bhagat [id number]     SUBJECTIVE:  Able to say yes or no answers. States \"not time for bathroom\" when asked about if he mobilizes.     OBJECTIVE:  VS: /66 (BP Location: Left arm)  Pulse 65  Temp 97.4  F (36.3  C) (Oral)  Resp 16  Ht 1.829 m (6')  Wt 107.8 kg (237 lb 11.2 oz)  SpO2 98%  BMI 32.24 kg/m2   General: In no acute distress, mild facial muscle wasting  Neuro: Unclear if orientated to place or time, No asterixis  HEENT:  Moderate scleral icterus  CV: S1/S2with gr 2/4 murmurs. Skin warm and dry  Lungs: clear to auscultation Respirations even and nonlabored on room air  Abd: Nontender, moderately distended +BS  Extrem: Trace lower peripehral edema  Skin: Moderate jaundice    REVIEW OF LABORATORY, PATHOLOGY AND IMAGING RESULTS:  BMP    Recent Labs  Lab 02/28/17  0916 02/27/17  1635 02/27/17  0940 02/26/17  0930   * 123* 122* 124*   POTASSIUM 4.0 4.2 4.6 4.8   CHLORIDE 95 92* 91* 92*   JACOB 9.1 8.5 8.6 8.5   CO2 17* 17* 18* 19*   BUN 76* 76* 67* 53*   CR 2.73* 2.29* 2.40* 1.37*   * 354* 334* 376*     CBC    Recent Labs  Lab 02/28/17  0916 02/27/17  0940 02/26/17  0930 02/25/17  0707   WBC 5.6 7.4 7.8 7.2   RBC 2.93* 3.22* 3.07* 3.18*   HGB 10.0* 11.0* 10.6* 10.9*   HCT 28.5* 30.4* 29.4* 30.8*   MCV 97 94 96 97   MCH 34.1* 34.2* 34.5* 34.3*   MCHC 35.1 36.2 36.1 35.4   RDW 15.4* 15.5* 15.4* 15.4*   PLT 43* 55* 50* 41*     INR    Recent Labs  Lab 02/28/17  0916 02/27/17  0940 02/26/17  0930 02/25/17  0707   INR 1.61* 1.61* 1.58* 1.59*     LFTs    Recent Labs  Lab 02/28/17  0916 02/27/17  0940 02/26/17  0930 02/25/17  0707   ALKPHOS 207* 248* 280* 260*   * 137* 138* 147*   ALT 86* 83* 93* 98*   BILITOTAL 36.3* 37.9* 34.3* 36.4*   PROTTOTAL 5.1* 4.7* 4.9* 5.0*   ALBUMIN 3.1* 2.7* 2.6* 2.8*      PANC  No lab results found in last 7 days.   MELD-Na score: 37 at 2/28/2017  9:16 AM  MELD score: 35 at 2/28/2017  9:16 AM  Calculated from:  Serum " Creatinine: 2.73 mg/dL at 2/28/2017  9:16 AM  Serum Sodium: 126 mmol/L at 2/28/2017  9:16 AM  Total Bilirubin: 36.3 mg/dL at 2/28/2017  9:16 AM  INR(ratio): 1.61 at 2/28/2017  9:16 AM  Age: 51 years      Imaging Results:  MRI w/wo 1/6/17  IMPRESSION:  1. Cirrhotic appearance of the liver with evidence of portal  hypertension including splenomegaly and small-to-moderate ascites.  2. Hepatic segment 3 previously treated lesion. The overall treatment  zone size is decreased, though the degree of enhancing soft tissue  within the treatment zone is not substantially changed. This low-level  enhancing tissue continues to remain concerning for malignancy and  attention on short-term follow-up is needed. OPTN 5T.  3. Based on this exam alone, the patient is within Jorge Alberto criteria.    IMPRESSION:  Camacho Bhagat is a 51 year old male with a history of CASTAÑEDA cirrhosis, HCC s/p TACE/RFA, SBP, ascites, HE, HLD, HTN, and DM II who was admitted with evidence of HE. He is listed for liver transplant but currently on hold due to need for cardiology clearance. He has had mild improvement in his mentation and has been started on empiric treatment of SBP. His sodium level has improved with hydration.     RECOMMENDATIONS:   1. Hepatic encephalopathy  - continue lactulose, titrating for 3-5 stools per day, now with worsening mentation being transferred for additional care.   - rifaximin 550 mg BID  - infectious work up so far negative  - limit narcotics     2. Ascites  3. SBP hx  - para 2/27 without SBP  Prior sample negative for SBP  - if negative, will need SBP ppx with ciprofloxacin- 250 mg qod  - due to kidney function, limit para volume to 4 liters.   - May need more frequent small volume ricardo for comfort.      4. CASTAÑEDA cirrhosis  5. HCC  6. Transplant candidacy  - listed for transplant. Current MELD 37.  RHC 2/24 with fluid volume overload. Cardiology cleared  - kidney function limiting being able to perform MRI- worsening bili can  be related to liver failure. .   - re activated for liver transplant. Continue with daily MELD labs.    - Agree with US abd to evaluate for PVT     8. Malnutrition  - monitor calorie count, may need NG tube if not taking in calories.        Thank you for the opportunity to be involved with  Camacho luciano. Please call with any questions or concerns.    LAMIN Castañeda, CNP  [phone]

## 2017-02-28 NOTE — PROGRESS NOTES
[facility]  Daily Progress Note      Date of Admission: 2/21/2017  Date of Service: 02/28/2017    Camacho Bhagat MRN# [id number]   Age: 51 year old YOB: 1965     Interval History     Increasesed somnolence this morning.  Only one bowel movement overnight.  Denies any abdominal pain, shortness of breath, cough.  He answers most questions with a few words.     Medications     Medications and allergies reviewed in [facility].  Changes are reflected in the assessment and plan.      Physical Exam   /42 (BP Location: Left arm)  Pulse 66  Temp 96.3  F (35.7  C) (Axillary)  Resp 16  Ht 1.829 m (6')  Wt 107.8 kg (237 lb 11.2 oz)  SpO2 96%  BMI 32.24 kg/m2    Wt Readings from Last 1 Encounters:   02/25/17 107.8 kg (237 lb 11.2 oz)    Body mass index is 32.24 kg/(m^2).     Physical Exam   Constitutional: He is oriented to person, place, and time.   Somnolent, oriented to persona and place. No acute distress.    HENT:   Head: Normocephalic and atraumatic.   Eyes: EOM are normal. Pupils are equal, round, and reactive to light. Scleral icterus is present.   Neck: Neck supple.   Cardiovascular: Normal rate and regular rhythm.   Pulmonary/Chest: Effort normal and breath sounds normal. No respiratory distress. He has no wheezes.   Abdominal: Soft. Bowel sounds are normal. There is no tenderness. There is no rebound and no guarding. Distended - improved from yesterday.   Musculoskeletal: Normal range of motion. He exhibits no edema.   Neurological: He is somnolent, but oriented to person, place and time.  No gross focal findings.    Skin: Skin is warm. No erythema.     Drains and Tubes: None.      Intravascular Access and Device: Peripheral IV x 2       Intake/Output Summary (Last 24 hours) at 02/28/17 1533  Last data filed at 02/28/17 1442   Gross per 24 hour   Intake             2063 ml   Output             1950 ml   Net              113 ml     Data     Labs & Studies of Note: I  personally reviewed the following studies:    Imaging:   No results found for this or any previous visit (from the past 24 hour(s)).      Main Plans for Today   - Nephrology consulted for worsening renal function.    - Transfer to  for closer monitoring of worsening hepatic encephalopathy.    - 75 g albumin for 2 days  - suspect AIN vs. Bilirubin cast nephropathy.    - US abdomen complete - with dopplers to assess for portal vein thrombus and to assess for hydronephrosis.     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 Now admitted again due to concern for worsening encephalopathy and infection.       Hepatic Encephalopathy (worsening today): Likely 2/2 to medication non compliance.Unable to obtain diagnostic paracentesis on admission due to very limited fluid pocket following therapeutic tap prior to arrival. Started empirically on Zosyn due to concern for SBP and ?aspiration pneumonia on CXR. Head CT on admission was negative for any acute intracranial process.   - Lactulose 3 times daily - PO. Titrate for 3-5 stools per day.   - Rifaximin 550 mg bid  - PRN Lactulose PO vs enemas per mental status.   - Nursing to continue HE protocol. He continues to have somonlence - nursing continuing to encourage lactulose compliance      Cryptogenic cirrhosis/CASTAÑEDA  HCC S/P TACE 09/2016:   Recurrent Ascites/SBP:   - Repeat US with dopplers today to assess for portal venous thrombosis.    - D/C Zosyn 02/24 as there is no clear sign of infection. Ciprofloxacin held due to concern for AIN/rising creatinine.    - Diagnostic paracentesis 02/23 - negative for SBP  - Diagnostic/therapeutic para negative for SBP.   - UA 2/27 pending.   - Hepatology consulted - appreciate recs.   - Transplant candidacy- awaiting listing for  transplant due to high MELD. Right heart cath 2/24 - with increased right and left sided filling pressures - however no contraindication to transplant aside from diuresis now for volume overload.  - MRI abdomen w/wo contrast pending improvement of creatinine to evaluate for recurrence of HCC       Acute Kidney Injury(wosrening - possible due to AIN vs bilirubin cast nephropathy in setting of diuretics):   Hyponatremia (hypervolemic):   Hyperkalemia(resolved):   : Pt with recurrent issues with hyperkalemia - thought to be related to his ACEI and Aldactone - however he has been discontinued on this medication on recent discharges. K was 6 on admission - now improved.   - EJ initially resolving. Nephrology now consulted for worsening cr from 1.3 to 2.7  - RHC on 2/24 with increased filling pressures.   - Now with rise in creatinine to 2.4 - DDx includes AIN, vs. Bilirubin cast nephropathy in the setting of diuretics.    - US complete to assess for hydronephrosis - pending.    - Bladder scan and monitor post void residuals.   - UA with no clear signs of infection.   - Trend Cr. MRI pending improvement of Creatinine.  - Strict I/Os. Daily weights.         Urinary retention  H/O Scrotal edema - Has been recent evaluated by urology. Intermittently has needed a vazquez catheter due to retention on recent admissions. Recurrent issue again requiring vazquez catheter placement on admit. Evaluated for a scrotal mass on last admit - signs of possible inflammatory changes without a drainable fluid collection.   - Recurrent scrotal irritation - no signs of infection - topical antifungals PRN  - Monitor for difficulty voiding - may require vazquez catheter.   - Post void residual PRN to ensure he is not retaining.       Type II DM:  - Endocrine consulted 2/27- mother and pt's wife's are concerned with his outpt regimen - would like to establish care here at the [facility]. Consult for inpatient management and to establish outpt plan.   -  Insulin gtt during acute illness - endocrine following.  Check blood sugars per protocol while on an insulin gtt.    - Hypoglycemia protocol     ------------------------------------------------------------------------------------------------------------------  Prophylaxis:   -GI: PPI  -DVT: None due to bleeding risk.       FEN: Mod carbohydrate diet - Nutrition consulted to help with dietary rec given recurrent hyperkalemia. NPO at midnight for Ultrasound in the AM.    Consults: Hepatology, dietician, Cardiology.   Family: Mother updated at bedside 2/26. Further updates PRN  Code status: Full code.   Disposition: Pending further transplant work up, improvement of kidney function, and further eval of worsening liver disease.   =========================================================  Patient was discussed and evaluated with Dr. Edilberto Martin MD    PGY2  [phone]

## 2017-02-28 NOTE — PLAN OF CARE
Problem: Goal Outcome Summary  Goal: Goal Outcome Summary  Outcome: No Change  VSS. Pt A&O x4, easily aroused and opens eyes to voice. Tylenol x1 for hand pain. At beginning of shift pt pulled out both PIVs. One new PIV was placed and Insulin drip restarted. ~0200 pt pulled out PIV again. This time 2 PIVs placed and insulin drip restarted. Velcro guard placed over both PIVs. 0400 , increased to Algorithm 3. Q1H BG checks. No BM overnight. Jaundice. Bed alarm on. Continue to monitor.     Pt c/o of leg pain. One time dose of Norco administered.     7am BG 82. Insulin dripped stopped. Recheck BG at 0800.

## 2017-02-28 NOTE — PROGRESS NOTES
Diabetes Consult Daily  Progress Note          Assessment/Plan:   Mr. Bhagat is a 51 year old with uncontrolled type 2 diabetes and with end stage liver disease admitted to [facility] 2/21/2017 with worsening encephalopathy and concern for infection. He is experiencing persistent hyperglycemia in the setting of likely omitted aspart, building glucose toxicity, and potentially infection.     Well-controlled glucose with IV insulin infusion rates down to 1 unit/h or less by this morning.  Increasing IV insulin needs this afternoon with glargine wearing off from yesterday.  Renal function worse yet today and now pt made NPO.     Plan  Will continue IV insulin infusion for now.  Aspart 4.5 units/carb unit to be given if pt takes PO.   Ensure should not be left at bedside for pt to sip unmonitored (needs carb coverage for all snacks/supplements).      We will follow.  If patient is amenable, will refer for outpatient diabetes follow up at [facility] (this is the desire of his wife and mother).              Interval History:   Glargine 70 units received yesterday plus roughly another 1 unit/h would likely meet pt's basal insulin needs, but w/ rising creatinine this is less certain.  Pt hardly able to give verbal response.  Denied hunger.  Did not endorse pain.  Per primary team, plan was for transfer to  for closer monitoring.      Recent Labs  Lab 02/28/17  1452 02/28/17  1348 02/28/17  1235 02/28/17  1129 02/28/17  1030 02/28/17  0916  02/27/17  1635  02/27/17  0940  02/26/17  0930  02/25/17  0707  02/24/17  1534   GLC  --   --   --   --   --  129*  --  354*  --  334*  --  376*  --  382*  --  251*   * 161* 152* 133* 140* 115*  < >  --   < >  --   < >  --   < >  --   < > 234*   < > = values in this interval not displayed.            Review of Systems:   See interval hx          Medications:   Lactulose scheduled and PRN-- past exploration into quantity of sugar was unrevealing (proprietary  recipe)    Active Diet Order      Moderate Consistent CHO Diet     Physical Exam:  Gen:  resting in bed, in NAD, jaundice   HEENT: NC/AT, icteric sclerae, mucous membranes are sl dry  Resp: Unlabored  Neuro: arouse to verbal stimulation, but slow and limited reponses  /69  Pulse 66  Temp 97.1  F (36.2  C) (Oral)  Resp 16  Ht 1.829 m (6')  Wt 107.8 kg (237 lb 11.2 oz)  SpO2 99%  BMI 32.24 kg/m2           Data:     Lab Results   Component Value Date    A1C 9.4 02/16/2017    A1C 6.2 12/14/2016    A1C 6.1 10/27/2016    A1C 8.3 07/02/2012    A1C 8.5 07/31/2009              CBC RESULTS:   Recent Labs   Lab Test  02/28/17 0916   WBC  5.6   RBC  2.93*   HGB  10.0*   HCT  28.5*   MCV  97   MCH  34.1*   MCHC  35.1   RDW  15.4*   PLT  43*     Recent Labs   Lab Test  02/28/17   0916  02/27/17   1635   NA  126*  123*   POTASSIUM  4.0  4.2   CHLORIDE  95  92*   CO2  17*  17*   ANIONGAP  14  14   GLC  129*  354*   BUN  76*  76*   CR  2.73*  2.29*   JACOB  9.1  8.5     Liver Function Studies -   Recent Labs   Lab Test  02/28/17 0916   PROTTOTAL  5.1*   ALBUMIN  3.1*   BILITOTAL  36.3*   ALKPHOS  207*   AST  135*   ALT  86*     Lab Results   Component Value Date    INR 1.61 02/28/2017    INR 1.61 02/27/2017    INR 1.58 02/26/2017       Janice Guzman APRN [facility] [phone]    Diabetes Management job code 024

## 2017-02-28 NOTE — CONSULTS
Nephrology Initial Consult  February 28, 2017      Camacho Bhagat MRN:[id number] YOB: 1965  Date of Admission:2/21/2017  Primary care provider: Shay Kirkpatrick  Requesting physician: Bessy Casanova DO    ASSESSMENT AND RECOMMENDATIONS:   51yM with hx of CASTA[place]EDA cirrhosis/ESLD with portal HTN requiring twice weekly paracentesis, T2DM on insulin, HCC s/p TACE in 9/2016, GERD on PPI, and normal baseline renal function that is admitted with hepatic encephalopathy, hyperglycemia, and decompensated liver disease now with EJ for which renal is consulted.    1. EJ, baseline Cr 0.7-0.8, admission Cr 1.4, acute rise in Cr to 2.4 on 2/27 and 2.7 on 2/28. UA with few WBC, hyaline casts, glucose previously. Rise in Cr this admission correlates with rising bili (now 36) and diuresis with lasix re-initiation. No neida hypotension. I'm concerned about bile cast nephropathy and/or pre-renal state. Other etiology to consider would be AIN from Cipro, need to also rule out obstruction.   2. Hyperkalemia on admission, resolved, likely 2/2 increased intake and use of spironolactone and decreased renal function  3. Metabolic acidosis, likely 2/2 EJ and lactulose induced stool output  4. Cirrhosis 2/2 CASTA[place]EDA, with portal HTN requiring twice weekly paracentesis and grade 1 varices  5. Hx of HCC s/p TACE 9/2016, needs repeat MRI with contrast with renal function improved  6. Rising bilirubin, now 36, unclear etiology for more acute rise  7. Hepatic encephalopathy, improved with lactulose this admission  8. GERD on PPI    Recs:  1. Hold diuretics for now  2. Volume expand with albumin, 75g today and tomorrow  3. Will get repeat UA and obtain urine sodium and FENA and urine eos  4. Suggest abdominal ultrasound with doppler  5. Please get daily weight, strict I/O's  6. Daily BMP    Staffed with Dr. Crowe. Recommendations were communicated to primary team directly.     Doug Elaine  Nephrology Fellow  Pager: [phone]      REASON FOR  CONSULT: EJ  Consulting Provider: Dr. Martin    HISTORY OF PRESENT ILLNESS:  Camacho Bhagat is a 51 year old male with a hx of T2DM on insulin, ESLD with [place] cirrhosis requiring twice weekly paracentesis, hepatocellular carcinoma s/p TACE in 9/2016, and multiple recent admissions for hepatic encephalopathy and hyperglycemia admitted again on 2/21 with encephalopathy and malaise at home. His mother states that he didn't seem well, couldn't get him to take his lactulose and he was altered and weak. He did not have new abdominal pain or fever. On admission, his K was 6 and Cr was 1.4 and he was confused and started back on lactulose. Encephalopathy has improved dramatically and hyperkalemia resolved with holding spironolactone and low K diet. He had tap right before he came in with 6L removed, another tap shortly after admission (diagnostic) without evidence for SBP. He had RHC done on 2/24 for transplant work-up, showed PA 43/25 with wedge of 20 and CI 4.0 with CO of 8 l/min. He was hyperglycemic on admission with 's, which has been a problem lately. He had osmotic diuresis with glucose in urine and >3L UOP/24hrs there first couple days here. After the RHC showing elevated pressures, he was started on lasix (20mg IV, followed by 40mg daily from 2/25-2/27. He did not receive any contrast. His BP was elevated on admission, has trended down with diuresis and now 100's systolic. Hgb stable, no evidence of infection, started on Cipro for SBP prophylaxis on 2/24, on PPI long term. He had paracentesis yesterday and was given albumin with this. Lasix is now held today. Glucose now controlled on insulin gtt, endo consulted.     Of note, he also has a hx of HCC treated with TACE in 9/2016 -> repeat MRI showed no new lesions but probable residual tumor. He has not had repeat MRI after, although this is suggested now. He is not active on liver transplant list yet, awaiting HCC MRI result. His bili has climbed from 10 before  this admission to now 36 today, unclear etiology.     Camacho feels that his mental status is improved and his weight is down. He doesn't have other new symptoms. He denies SOB and feels his abdomen is only mildly distended and is much better than it has been.     PAST MEDICAL HISTORY:  Reviewed with patient   Past Medical History   Diagnosis Date     Acute venous embolism and thrombosis of other specified veins 11/01     Deep vein thrombophlebitis, filter placed     Cancer (H)      Depressive disorder, not elsewhere classified      Esophageal reflux      Fibromyalgia 1/2009     dx with Dr Benitez( Rhe[facility])     Osteoarthritis      Other and unspecified hyperlipidemia      Other chronic nonalcoholic liver disease      Fatty liver      Other testicular hypofunction      Pneumonia, organism unspecified 10-01     Included ARDS, sepsis, and  acute renal failure; hospitalized     Rheumatoid arthritis(714.0)      Type II or unspecified type diabetes mellitus without mention of complication, not stated as uncontrolled 11/01     Managed by endocrinology     Unspecified essential hypertension 11/01     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     Unspecified sleep apnea      Uses BiPAP     Past Surgical History   Procedure Laterality Date     C nonspecific procedure       tracheostomy     C nonspecific procedure       repair of deviated septum     C nonspecific procedure  2007     Rt knee arthroscopy     C total knee arthroplasty  2008     Right knee arthroscopy     Cholecystectomy       Esophagoscopy, gastroscopy, duodenoscopy (egd), combined N/A 8/4/2016     Procedure: COMBINED ESOPHAGOSCOPY, GASTROSCOPY, DUODENOSCOPY (EGD), BIOPSY SINGLE OR MULTIPLE;  Surgeon: Trent Pederson MD;  Location:  GI      MEDICATIONS:  All Current meds and meds given in the hospital.    PTA Meds  Prior to Admission medications    Medication Sig Last Dose Taking? Auth Provider   LISINOPRIL PO Take by mouth daily   Reported, Patient   insulin  glargine (LANTUS) 100 UNIT/ML injection Inject 65 Units Subcutaneous every morning   Serjio Singleton MD   insulin lispro (HUMALOG) 100 UNIT/ML injection Inject 20 Units Subcutaneous 3 times daily (before meals)   Serjio Singleton MD   furosemide (LASIX) 40 MG tablet Take 1 tablet (40 mg) by mouth daily   Serjio Singleton MD   spironolactone (ALDACTONE) 50 MG tablet Take 1 tablet (50 mg) by mouth daily   Toñito Camejo MD   rifaximin (XIFAXAN) 550 MG TABS tablet Take 1 tablet (550 mg) by mouth 2 times daily   Toñito Camejo MD   ondansetron (ZOFRAN-ODT) 4 MG ODT tab Take 1 tablet (4 mg) by mouth every 8 hours as needed for nausea   Godwin Willson MD   glucagon 1 MG SOLR injection Inject 1 mg Subcutaneous every 15 minutes as needed for low blood sugar (May repeat x 1 only)   Godwin Willson MD   lactobacillus rhamnosus, GG, (CULTURELL) capsule Take 1 capsule by mouth 2 times daily   Eagle Tejada MD   cyclobenzaprine (FLEXERIL) 10 MG tablet Take 1 tablet (10 mg) by mouth 3 times daily as needed for muscle spasms   Eagle Tejada MD   gabapentin (NEURONTIN) 300 MG capsule Take 2 capsules (600 mg) by mouth At Bedtime   Eagle Tejada MD   omeprazole (PRILOSEC) 20 MG capsule Take 1 capsule (20 mg) by mouth 2 times daily (before meals)   Eagle Tejada MD      ALLERGIES:    Allergies   Allergen Reactions     Erythromycin GI Disturbance     Vioxx      Nausea, vomiting       REVIEW OF SYSTEMS:  A 10 point review of systems was negative except as noted above.    SOCIAL HISTORY:   Social History     Social History     Marital status:      Spouse name: N/A     Number of children: 1     Years of education: N/A     Occupational History            Social History Main Topics     Smoking status: Former Smoker     Smokeless tobacco: Former User     Types: Chew     Quit date: 10/8/2015      Comment: Has used chewing tobacco since age 15 , chewed for 20yrs       Alcohol use No      Comment: last drink about a year ago (quit )     Drug use: No     Sexual activity: Yes     Partners: Female     Other Topics Concern     Not on file     Social History Narrative    uSED TO BE      Back in school now         FAMILY MEDICAL HISTORY:   Family History   Problem Relation Age of Onset     DIABETES Father      Hypertension Father      Substance Abuse Father      Arthritis Mother      CANCER Mother      Thyroid     Thyroid Disease Mother      Other Cancer Mother      Colon Cancer No family hx of      Hyperlipidemia No family hx of      Coronary Artery Disease No family hx of      CEREBROVASCULAR DISEASE No family hx of      Breast Cancer No family hx of      Prostate Cancer No family hx of      Reviewed with patient for lack of kidney disease    PHYSICAL EXAM:   Temp  Av.6  F (36.4  C)  Min: 95.7  F (35.4  C)  Max: 99.3  F (37.4  C)      Pulse  Av.4  Min: 6  Max: 106 Resp  Av.2  Min: 10  Max: 23  SpO2  Av.2 %  Min: 95 %  Max: 100 %  FiO2 (%)  Av %  Min: 21 %  Max: 21 %       /66 (BP Location: Left arm)  Pulse 65  Temp 97.4  F (36.3  C) (Oral)  Resp 16  Ht 1.829 m (6')  Wt 107.8 kg (237 lb 11.2 oz)  SpO2 98%  BMI 32.24 kg/m2   Date 17 0700 - 17 0659   Shift 2015-6860 8796-5133 7226-1098 24 Hour Total   I  N  T  A  K  E   P.O. 880   880    Shift Total  (mL/kg) 880  (8.16)   880  (8.16)   O  U  T  P  U  T   Urine 800   800    Stool 800   800    Shift Total  (mL/kg) 1600  (14.84)   1600  (14.84)   Weight (kg) 107.82 107.82 107.82 107.82     Admit Weight: 103.9 kg (229 lb) (bed scale)     GENERAL APPEARANCE: sleepy but arouses and answers basic questions  Head NC/AT  EYES: + scleral icterus  HENT: mouth  without ulcers or lesions  NECK: supple  Lymphatics: no cervical LAD  Pulmonary: lungs clear to auscultation with equal breath sounds bilaterally  CV: regular rhythm, normal rate, no rub   - JVP appears edmundo about 5cm   -  Edema: none peripheral  GI: soft, nontender, mildly distended but not tense  MS: no evidence of inflammation in joints, no muscle tenderness  : no Crowe, scrotum not swollen  SKIN: no rash, warm, dry  NEURO: mentation intact and speech normal, oriented x3, able to do simple math    LABS:   All Reviewed in Epic from this admission and last 3 months    IMAGING:  Reviewed all imaging from this admission.    Dogu Elaine  Nephrology Fellow  Pager: [phone]        I, Raffi Crowe, saw this patient with Dr. Elaine and agree with the findings and plan of care as documented in the note.

## 2017-02-28 NOTE — CONSULTS
Assessment and Plan:  1. Liver transplant evaluation - patient is a good candidate overall. Benefits and surgical risks of a liver transplantation were discussed.  2. End stage liver disease due to cirrohsis of the liver due to CASTAÑEDA, HCC, s/p TACE, RFA    Surgical evaluation:  1. Portal Vein: patent  2. Hepatic Artery: unremarkable  3. TIPS: No  4. Previous Abdominal Surgery: No  5. Hepatocellular Carcinoma: Yes, s/p TACE x2 (Jan 2016) and Y-90 x1 (April 2016)  6. Ascites: Small volume  7. Costal Angle: Wide  8. Portopulmonary Hypertension: No  9. Hepatopulmonary Syndrome: No  10. Cardiac Evaluation: High right and left sided filling pressures. Mild pulm HTN, secondary to high cardiac output in setting of liver failure. Hyperdynamic cardiac output 8.9 L/min with index 4 L/min/m2  11. Nutritional Status: Fair, Albumin 3.0     Recommendations:   1. Continue management of his encephalopathy - lactulose, rifaximin, etc.  2. Correct sodium and volume status.  3. Follow up cultures to ensure no infection.      Patients overall evaluation will be discussed at the Liver Transplant selection committee meeting with a final recommendation on the patients suitability for transplant to be made at that time.    Consult Full Details:  Patient was seen in consultation at the request of Dr. Camejo for evaluation as a potential liver transplant recipient.    Reason for Visit:  51 year old year old male with cirrohsis of the liver due to CASTAÑEDA, h/o HCC, admitted for hepatic encephalopathy.    HPI:  Presenting complaint: HE, rule out infection  51M CASTAÑEDA cirrhosis, complicated by HCC, s/p TACE (Jan 2016), Y-90 (April 2016).  Multiple admissions for HE and SBP.  DVT s/p IVCF 11/2001.  Wife notes that his FSG have been elevated over the past week and that he has been noncompliant with his fluid restriction regimen.             Past Medical History:     Past Medical History   Diagnosis Date     Acute venous embolism and thrombosis of other  specified veins 11/01     Deep vein thrombophlebitis, filter placed     Cancer (H)      Depressive disorder, not elsewhere classified      Esophageal reflux      Fibromyalgia 1/2009     dx with Dr Benitez( Rheum)     Osteoarthritis      Other and unspecified hyperlipidemia      Other chronic nonalcoholic liver disease      Fatty liver      Other testicular hypofunction      Pneumonia, organism unspecified 10-01     Included ARDS, sepsis, and  acute renal failure; hospitalized     Rheumatoid arthritis(714.0)      Type II or unspecified type diabetes mellitus without mention of complication, not stated as uncontrolled 11/01     Managed by endocrinology     Unspecified essential hypertension 11/01     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     Unspecified sleep apnea      Uses BiPAP             Past Surgical History:     Past Surgical History   Procedure Laterality Date     C nonspecific procedure       tracheostomy     C nonspecific procedure       repair of deviated septum     C nonspecific procedure  2007     Rt knee arthroscopy     C total knee arthroplasty  2008     Right knee arthroscopy     Cholecystectomy       Esophagoscopy, gastroscopy, duodenoscopy (egd), combined N/A 8/4/2016     Procedure: COMBINED ESOPHAGOSCOPY, GASTROSCOPY, DUODENOSCOPY (EGD), BIOPSY SINGLE OR MULTIPLE;  Surgeon: Trent Pederson MD;  Location:  GI             Social History:     Social History   Substance Use Topics     Smoking status: Former Smoker     Smokeless tobacco: Former User     Types: Chew     Quit date: 10/8/2015      Comment: Has used chewing tobacco since age 15 , chewed for 20yrs      Alcohol use No      Comment: last drink about a year ago (quit 2001)             Family History:   The family history includes Arthritis in his mother; CANCER in his mother; DIABETES in his father; Hypertension in his father; Other Cancer in his mother; Substance Abuse in his father; Thyroid Disease in his mother. There is no history  of Colon Cancer, Hyperlipidemia, Coronary Artery Disease, CEREBROVASCULAR DISEASE, Breast Cancer, or Prostate Cancer.             Allergies:     Allergies   Allergen Reactions     Erythromycin GI Disturbance     Vioxx      Nausea, vomiting             Medications:   @  Current Facility-Administered Medications   Medication     lactulose (CHRONULAC) solution for enema prep 200 g     heparin sodium PF injection 5,000 Units     insulin glargine (LANTUS) injection 15 Units     Med Instruction - Transition from IV Insulin Infusion to Sub-Q Insulin     insulin aspart (NovoLOG) inj (RAPID ACTING)     miconazole with skin protectant (LEXI ANTIFUNGAL) 2 % cream     miconazole (MICATIN; MICRO GUARD) 2 % powder     insulin aspart (NovoLOG) inj (RAPID ACTING)     skin closure adhesive (DERMABOND PEN) pen     insulin 1 unit/mL in saline (novoLIN-Regular) drip - ADULT IV Infusion     lidocaine 1 % 1 mL     lidocaine (LMX4) kit     sodium chloride (PF) 0.9% PF flush 3 mL     sodium chloride (PF) 0.9% PF flush 3 mL     acetaminophen (TYLENOL) tablet 325 mg     lidocaine (LIDODERM) 5 % Patch 1 patch     lidocaine (LIDODERM) patch REMOVAL     lidocaine (LIDODERM) Patch in Place     lactulose (CHRONULAC) solution 20 g     ondansetron (ZOFRAN-ODT) ODT tab 4 mg     gadobutrol (GADAVIST) injection 10 mL     insulin aspart (NovoLOG) inj (RAPID ACTING)     rifaximin (XIFAXAN) tablet 550 mg     omeprazole (priLOSEC) CR capsule 20 mg     Daily 2 GRAM acetaminophen limit, unless fulminent liver failure or transaminases greater than or equal to 300 - 400, then none     lactulose (CHRONULAC) solution 20 g    Or     lactulose (CHRONULAC) solution for enema prep 100 g     naloxone (NARCAN) injection 0.1-0.4 mg     glucose 40 % gel 15-30 g    Or     dextrose 50 % injection 25-50 mL    Or     glucagon injection 1 mg     Facility-Administered Medications Ordered in Other Encounters   Medication     albumin human 25 % injection 12.5 g   @        Previous Transplant Hx: No    Cardiovascular Hx:  h/o Cardiac Issues: No  Exercise Tolerance: N/A    Potential Donor(s): No    ROS:   REVIEW OF SYSTEMS (check box if normal)  [X]   GENERAL [X]   PULMONARY [X]   GENITOURINARY  [X]    CNS [X]   CARDIAC [X]    ENDOCRINE  [X]   EARS,NOSE,THROAT [X]    GASTROINTESTINAL [X]   NEUROLOGIC   [X]   MUSCLOSKELTAL [X]    HEMATOLOGY    Examination:     Vitals:  There were no vitals taken for this visit.    GENERAL APPEARANCE: alert and no distress  EYES: PERRL  HENT: mouth without ulcers or lesions  NECK: supple, no adenopathy  RESP: lungs clear to auscultation - no rales, rhonchi or wheezes  CV: regular rhythm, normal rate, no rub   ABDOMEN: large, soft, nondistended, +ascites, nontender.  No surgical scars.  MS: extremities normal- no gross deformities noted, no evidence of inflammation in joints, no muscle tenderness  SKIN: no rash  NEURO: Normal strength and tone, sensory exam grossly normal, mentation intact and speech normal  PSYCH: mentation appears normal. and affect normal/bright      Results:   Lab Results   Component Value Date    WBC 5.6 02/28/2017     Lab Results   Component Value Date    RBC 2.93 02/28/2017     Lab Results   Component Value Date    HGB 10.0 02/28/2017     Lab Results   Component Value Date    HCT 28.5 02/28/2017     No components found for: MCT  Lab Results   Component Value Date    MCV 97 02/28/2017     Lab Results   Component Value Date    MCH 34.1 02/28/2017     Lab Results   Component Value Date    MCHC 35.1 02/28/2017     Lab Results   Component Value Date    RDW 15.4 02/28/2017     Lab Results   Component Value Date    PLT 43 02/28/2017       Last Basic Metabolic Panel:  Lab Results   Component Value Date     02/28/2017      Lab Results   Component Value Date    POTASSIUM 4.0 02/28/2017     Lab Results   Component Value Date    CHLORIDE 95 02/28/2017     Lab Results   Component Value Date    JACOB 9.1 02/28/2017     Lab Results    Component Value Date    CO2 17 02/28/2017     Lab Results   Component Value Date    BUN 76 02/28/2017     Lab Results   Component Value Date    CR 2.73 02/28/2017     Lab Results   Component Value Date     02/28/2017       Liver Function Studies -   Recent Labs   Lab Test  02/28/17   0916   PROTTOTAL  5.1*   ALBUMIN  3.1*   BILITOTAL  36.3*   ALKPHOS  207*   AST  135*   ALT  86*       Lab Results   Component Value Date    INR 1.61 02/28/2017    INR 1.61 02/27/2017    INR 1.58 02/26/2017    INR 1.59 02/25/2017    INR 1.65 02/24/2017    INR 1.67 02/23/2017    INR 1.67 02/22/2017    INR 1.64 02/21/2017    INR 1.50 02/21/2017    INR 1.61 02/14/2017    INR 1.55 02/14/2017    INR 1.52 02/07/2017       All laboratory data reviewed.  Results for RONNIE ARIZMENDI (MRN [id number]) as of 3/1/2017 14:57   Ref. Range 3/1/2017 07:13   Sodium Latest Ref Range: 133 - 144 mmol/L 126 (L)   Potassium Latest Ref Range: 3.4 - 5.3 mmol/L 3.9   Chloride Latest Ref Range: 94 - 109 mmol/L 97   Carbon Dioxide Latest Ref Range: 20 - 32 mmol/L 15 (L)   Urea Nitrogen Latest Ref Range: 7 - 30 mg/dL 94 (H)   Creatinine Latest Ref Range: 0.66 - 1.25 mg/dL 2.85 (H)   GFR Estimate Latest Ref Range: >60 mL/min/1.7m2 23 (L)   GFR Estimate If Black Latest Ref Range: >60 mL/min/1.7m2 28 (L)   Calcium Latest Ref Range: 8.5 - 10.1 mg/dL 8.9   Anion Gap Latest Ref Range: 3 - 14 mmol/L 14   Albumin Latest Ref Range: 3.4 - 5.0 g/dL 3.0 (L)   Protein Total Latest Ref Range: 6.8 - 8.8 g/dL 4.6 (L)   Bilirubin Total Latest Ref Range: 0.2 - 1.3 mg/dL 38.8 (HH)   Alkaline Phosphatase Latest Ref Range: 40 - 150 U/L 208 (H)   ALT Latest Ref Range: 0 - 70 U/L 83 (H)   AST Latest Ref Range: 0 - 45 U/L 141 (H)   Results for RONNIE ARIZMENDI (MRN [id number]) as of 3/1/2017 14:57   Ref. Range 3/1/2017 07:13   WBC Latest Ref Range: 4.0 - 11.0 10e9/L 6.1   Hemoglobin Latest Ref Range: 13.3 - 17.7 g/dL 9.8 (L)   Hematocrit Latest Ref Range: 40.0 - 53.0 % 28.1 (L)    Platelet Count Latest Ref Range: 150 - 450 10e9/L 45 (LL)   RBC Count Latest Ref Range: 4.4 - 5.9 10e12/L 2.90 (L)   MCV Latest Ref Range: 78 - 100 fl 97   MCH Latest Ref Range: 26.5 - 33.0 pg 33.8 (H)   MCHC Latest Ref Range: 31.5 - 36.5 g/dL 34.9   RDW Latest Ref Range: 10.0 - 15.0 % 15.5 (H)   Results for RONNIE ARIZMENDI (MRN [id number]) as of 3/1/2017 14:57   Ref. Range 3/1/2017 07:13   INR Latest Ref Range: 0.86 - 1.14  1.80 (H)     Results for RONNIE ARIZMENDI (MRN [id number]) as of 3/1/2017 14:57   Ref. Range 2/23/2017 13:30 2/23/2017 13:30 2/27/2017 14:30 2/27/2017 14:30 3/1/2017 03:40   Specimen Description Unknown Ascites Ascites Ascites Ascites Urine   Culture Micro Unknown No growth  Culture negative ...     Micro Report Status Unknown FINAL 02/23/2017 FINAL 02/28/2017 FINAL 02/27/2017 Pending    FLUID CULTURE AEROBIC BACTERIAL Unknown Rpt  Rpt     GRAM STAIN Unknown Rpt  Rpt       Results for RONNIE ARIZMENDI (MRN [id number]) as of 3/1/2017 14:57   Ref. Range 2/8/2016 11:44 12/9/2016 10:40   CMV Antibody IgG Latest Ref Range: 0.0 - 0.8 AI <0.2...    EBV Capsid Antibody IgG Latest Ref Range: 0.0 - 0.8 AI <0.2...    Hepatitis A Antibody IgG Latest Ref Range: NR  Nonreactive...    Hep B Surface Agn Latest Ref Range: NR  Nonreactive    Hepatitis B Surface Antibody Latest Ref Range: <8.00 m[IU]/mL 1.88    Hepatitis B Core Shirley Latest Ref Range: NR  Nonreactive    Hepatitis C Antibody Latest Ref Range: NR  Nonreactive...    HIV Antigen Antibody Combo Pretransplant Latest Ref Range: NR  Nonreactive...    Influenza A/B Agn Specimen Unknown  Nasopharyngeal   Influenza A Latest Ref Range: NEG   Negative   Influenza B Latest Ref Range: NEG   Negative...       All imaging studies reviewed.  EXAMINATION: US ABDOMEN COMPLETE WITH DOPPLER, 3/1/2017 10:18 AM      COMPARISON: Multiple priors, the most recent MRI from 1/6/2017 and  ultrasound from 10/27/2016.     HISTORY: History of HCC. Recess liver with increasing  bilirubin,  evaluate for portal vein thrombus, and evaluate kidneys for  hydronephrosis.     TECHNIQUE: The abdomen was scanned in standard fashion with  specialized ultrasound transducer(s) using both gray-scale, color  Doppler, and spectral flow techniques.     Findings:     Liver: The liver demonstrates coarse heterogeneous echogenicity.  Redemonstration of previously treated hepatocellular carcinoma in  segment 3 with a heterogeneously echogenic mass measuring 4.5 x 3.5 x  3.8 cm.      Extrahepatic portal vein flow is retrograde, previously antegrade,  measuring 37 cm/sec.  Right portal vein flow is retrograde, measuring 7 cm/sec.  Left portal vein flow is retrograde, measuring 18 cm/sec.  Recanalized periumbilical vein.     Flow in the hepatic artery is towards the liver and:  79 cm/sec peak systolic  0.85 resistive index.      The splenic vein is obscured. The left, middle, and right hepatic  veins are patent with flow towards the IVC. The IVC is patent with  flow towards the heart. The visualized aorta is not dilated.     Gallbladder: Surgically absent.     Bile Ducts: No significant intrahepatic biliary ductal dilatation. The  common bile duct was not well visualized.      Pancreas: Obscured by overlying bowel gas.     Kidneys: Both kidneys are of normal echotexture, without mass or  hydronephrosis. The craniocaudal dimensions are: right- 15.6 cm,  left- 14.3 cm.     Spleen: The spleen measures 20 cm in sagittal dimension.     Fluid: Small-volume ascites.         Impression:   1. Cirrhosis with redemonstration of previously treated segment 3  hepatocellular carcinoma. Evaluation for residual disease is limited  on the current exam.  2. Sequela of portal hypertension including splenomegaly and  small-volume ascites.  3. No evidence of portal venous thrombosis, however new retrograde  flow throughout the portal venous system compared to prior ultrasound  of 10/27/2016.  4. No hydronephrosis as  questioned.            I had a long discussion with the patient regarding liver transplantation which included but was not limited to  the following points:  1. Liver transplant selection committee process.  2. The federal rules for cadaveric waiting list, the size and blood type matching of the organ. The availability of living-related donor transplantation.  3. The types of donors: brain death donors, non-heart beating donors, partial liver grafts: splits and living donor grafts  4. Extended criteria Donors (older age, steasosis) and the increased risk of primary non-function using the extended criteria donors  5. The CDC high risk donors, Risk of donor transmitted infections and donor transmitted malignancy  6. The liver transplant operation and the associated risks and technical complications which can include intraoperative death, post operative death, Primary non-function, bleeding requiring re-operations, arterial and biliary complications, bowel perforations, and intra abdominal abscess. Some of these complicaitons may require a second operation.  7. The postoperative course, the ICU stay and risk of postoperative complications which can include sepsis, MI, stroke, brain injury, pneumonia, pleural effusions, and renal dysfunction.  8. The current 1 year and 5 year graft and patient survivals.  9. The need for life long immunosuppressive therapy and the side effects of these medications, including the possibility of toxicity, opportunistic infections, risk of cancer including lymphoma, and the possibility of rejection even if the patient is taking the medication exactly as prescribed.  10. The need for compliance with medications and follow-up visits in the clinic and thereafter.  11. The patient and family understand these risks and wish to proceed to transplantation    I spent 60 minutes with the patient and more than 50% of the time was spend in direct face to face counseling.

## 2017-02-28 NOTE — PLAN OF CARE
"Problem: Goal Outcome Summary  Goal: Goal Outcome Summary  Outcome: No Change  VSS. Patient sleepy through out the shift.  Patient reports feeling like \" he does not have enough energy.\" Chronic hand pain. Declines prn tylenol. Up with assist. Neuros remain intact. Paracentesis done at the bedside this afternoon. 4 L removed. Albumin given x 3 post paracentesis. Jaundice.Voiding spontaneously. Bladder scan post voids. Lactulose protocol intitated.Patient received lactulose prn x2. Large  loose bm x2. Patient is more clear than start of the shift. Insulin gtt started at 1750. Q 1 hr bg. Patient currently in Alg 2. Bed alarm on for safety. Tolerating small amounts of food. 1500 mL fluid restriction maintained. Resting in between cares. PLAN:  Monitor for s/s of hypo/hyperglycemia and changes in mentation.     Addendum:2313, Patient found with bilateral PIV pulled out. Insulin gtt stopped. Will restart once new PIV placed.   "

## 2017-02-28 NOTE — COMMITTEE REVIEW
Abdominal Committee Review Note     Evaluation Date: 2/8/2016  Committee Review Date: 2/28/2017    Organ being evaluated for: Liver    Transplant Phase: Waitlist  Transplant Status: Inactive    Transplant Coordinator: Jameson Morales Jr.  Transplant Surgeon:   Chaparro Anderson    Referring Physician:     Primary Diagnosis: Primary Liver Malignancy: Hepatoma, Hepatocellular Carcinoma (HCC)  Secondary Diagnosis: Cirrhosis: Fatty Liver (Alonso)    Committee Review Members:  Nutrition Joycelyn Reyes, RD   Pharmacist Mitch Kumar, [facility]    - Clinical JOSEY Kelley, Maria Dolores Sparrow, [facility]   Transplant Uche Mary MD, Zeenat Henriquez, RN, Sofi Vega, RN, Aggie Rothman, RN, Toñito Camejo MD, Jr Jameson Morales RN, Estephania Santana MD, Abigail Robison, APRN CNP, Abril Doty, RN, Flora Bueno MD   Transplant Surgery Adonaystephanie Cross MD       Transplant Eligibility: Cirrhosis with MELD, HCC, Cryptogenic Cirrhosis    Committee Review Decision: Make Active    Relative Contraindications: None    Absolute Contraindications: None    Committee Chair Uche Mary MD verbally attested to the committee's decision.    Committee Discussion Details: approved for listing

## 2017-03-01 ENCOUNTER — APPOINTMENT (OUTPATIENT)
Dept: GENERAL RADIOLOGY | Facility: CLINIC | Age: 52
DRG: 005 | End: 2017-03-01
Payer: COMMERCIAL

## 2017-03-01 ENCOUNTER — APPOINTMENT (OUTPATIENT)
Dept: ULTRASOUND IMAGING | Facility: CLINIC | Age: 52
DRG: 005 | End: 2017-03-01
Payer: COMMERCIAL

## 2017-03-01 LAB
ALBUMIN SERPL-MCNC: 3 G/DL (ref 3.4–5)
ALBUMIN UR-MCNC: NEGATIVE MG/DL
ALP SERPL-CCNC: 208 U/L (ref 40–150)
ALT SERPL W P-5'-P-CCNC: 83 U/L (ref 0–70)
ANION GAP SERPL CALCULATED.3IONS-SCNC: 14 MMOL/L (ref 3–14)
APPEARANCE UR: ABNORMAL
AST SERPL W P-5'-P-CCNC: 141 U/L (ref 0–45)
BACTERIA #/AREA URNS HPF: ABNORMAL /HPF
BILIRUB SERPL-MCNC: 38.8 MG/DL (ref 0.2–1.3)
BILIRUB UR QL STRIP: ABNORMAL
BUN SERPL-MCNC: 94 MG/DL (ref 7–30)
CALCIUM SERPL-MCNC: 8.9 MG/DL (ref 8.5–10.1)
CHLORIDE SERPL-SCNC: 97 MMOL/L (ref 94–109)
CO2 SERPL-SCNC: 15 MMOL/L (ref 20–32)
COLOR UR AUTO: ABNORMAL
CREAT SERPL-MCNC: 2.85 MG/DL (ref 0.66–1.25)
CREAT UR-MCNC: 102 MG/DL
CREAT UR-MCNC: 102 MG/DL
EOSINOPHIL SPEC QL WRIGHT STN: NORMAL
ERYTHROCYTE [DISTWIDTH] IN BLOOD BY AUTOMATED COUNT: 15.5 % (ref 10–15)
FRACT EXCRET NA UR+SERPL-RTO: NORMAL %
GFR SERPL CREATININE-BSD FRML MDRD: 23 ML/MIN/1.7M2
GLUCOSE SERPL-MCNC: 143 MG/DL (ref 70–99)
GLUCOSE UR STRIP-MCNC: NEGATIVE MG/DL
HCT VFR BLD AUTO: 28.1 % (ref 40–53)
HGB BLD-MCNC: 9.8 G/DL (ref 13.3–17.7)
HGB UR QL STRIP: NEGATIVE
HYALINE CASTS #/AREA URNS LPF: 2 /LPF (ref 0–2)
INR PPP: 1.8 (ref 0.86–1.14)
KETONES UR STRIP-MCNC: NEGATIVE MG/DL
LEUKOCYTE ESTERASE UR QL STRIP: NEGATIVE
MCH RBC QN AUTO: 33.8 PG (ref 26.5–33)
MCHC RBC AUTO-ENTMCNC: 34.9 G/DL (ref 31.5–36.5)
MCV RBC AUTO: 97 FL (ref 78–100)
MUCOUS THREADS #/AREA URNS LPF: PRESENT /LPF
NITRATE UR QL: NEGATIVE
OSMOLALITY UR: 372 MMOL/KG (ref 100–1200)
PH UR STRIP: 5 PH (ref 5–7)
PLATELET # BLD AUTO: 45 10E9/L (ref 150–450)
POTASSIUM SERPL-SCNC: 3.9 MMOL/L (ref 3.4–5.3)
PROT SERPL-MCNC: 4.6 G/DL (ref 6.8–8.8)
RBC # BLD AUTO: 2.9 10E12/L (ref 4.4–5.9)
RBC #/AREA URNS AUTO: <1 /HPF (ref 0–2)
SODIUM SERPL-SCNC: 126 MMOL/L (ref 133–144)
SODIUM UR-SCNC: <5 MMOL/L
SP GR UR STRIP: 1.01 (ref 1–1.03)
SPECIMEN SOURCE: NORMAL
SQUAMOUS #/AREA URNS AUTO: <1 /HPF (ref 0–1)
TRANS CELLS #/AREA URNS HPF: <1 /HPF (ref 0–1)
URN SPEC COLLECT METH UR: ABNORMAL
UROBILINOGEN UR STRIP-MCNC: NORMAL MG/DL (ref 0–2)
WBC # BLD AUTO: 6.1 10E9/L (ref 4–11)
WBC #/AREA URNS AUTO: 3 /HPF (ref 0–2)

## 2017-03-01 PROCEDURE — 80053 COMPREHEN METABOLIC PANEL: CPT | Performed by: STUDENT IN AN ORGANIZED HEALTH CARE EDUCATION/TRAINING PROGRAM

## 2017-03-01 PROCEDURE — 25000132 ZZH RX MED GY IP 250 OP 250 PS 637: Performed by: STUDENT IN AN ORGANIZED HEALTH CARE EDUCATION/TRAINING PROGRAM

## 2017-03-01 PROCEDURE — 99233 SBSQ HOSP IP/OBS HIGH 50: CPT | Performed by: INTERNAL MEDICINE

## 2017-03-01 PROCEDURE — 00000146 ZZHCL STATISTIC GLUCOSE BY METER IP

## 2017-03-01 PROCEDURE — 25000132 ZZH RX MED GY IP 250 OP 250 PS 637: Performed by: INTERNAL MEDICINE

## 2017-03-01 PROCEDURE — 25000128 H RX IP 250 OP 636: Performed by: INTERNAL MEDICINE

## 2017-03-01 PROCEDURE — 36415 COLL VENOUS BLD VENIPUNCTURE: CPT | Performed by: STUDENT IN AN ORGANIZED HEALTH CARE EDUCATION/TRAINING PROGRAM

## 2017-03-01 PROCEDURE — 40000556 ZZH STATISTIC PERIPHERAL IV START W US GUIDANCE

## 2017-03-01 PROCEDURE — 76700 US EXAM ABDOM COMPLETE: CPT | Mod: XS

## 2017-03-01 PROCEDURE — 12000006 ZZH R&B IMCU INTERMEDIATE UMMC

## 2017-03-01 PROCEDURE — 84540 ASSAY OF URINE/UREA-N: CPT | Performed by: INTERNAL MEDICINE

## 2017-03-01 PROCEDURE — 27210995 ZZH RX 272

## 2017-03-01 PROCEDURE — 83935 ASSAY OF URINE OSMOLALITY: CPT | Performed by: INTERNAL MEDICINE

## 2017-03-01 PROCEDURE — 97535 SELF CARE MNGMENT TRAINING: CPT | Mod: GO | Performed by: OCCUPATIONAL THERAPIST

## 2017-03-01 PROCEDURE — 40000141 ZZH STATISTIC PERIPHERAL IV START W/O US GUIDANCE

## 2017-03-01 PROCEDURE — 25000131 ZZH RX MED GY IP 250 OP 636 PS 637: Performed by: CLINICAL NURSE SPECIALIST

## 2017-03-01 PROCEDURE — 40000802 ZZH SITE CHECK

## 2017-03-01 PROCEDURE — 85610 PROTHROMBIN TIME: CPT | Performed by: STUDENT IN AN ORGANIZED HEALTH CARE EDUCATION/TRAINING PROGRAM

## 2017-03-01 PROCEDURE — 74000 XR ABDOMEN PORT F1 VW: CPT

## 2017-03-01 PROCEDURE — 84300 ASSAY OF URINE SODIUM: CPT | Performed by: INTERNAL MEDICINE

## 2017-03-01 PROCEDURE — 89190 NASAL SMEAR FOR EOSINOPHILS: CPT | Performed by: INTERNAL MEDICINE

## 2017-03-01 PROCEDURE — 82570 ASSAY OF URINE CREATININE: CPT | Performed by: INTERNAL MEDICINE

## 2017-03-01 PROCEDURE — 97165 OT EVAL LOW COMPLEX 30 MIN: CPT | Mod: GO | Performed by: OCCUPATIONAL THERAPIST

## 2017-03-01 PROCEDURE — P9047 ALBUMIN (HUMAN), 25%, 50ML: HCPCS | Performed by: INTERNAL MEDICINE

## 2017-03-01 PROCEDURE — 40000133 ZZH STATISTIC OT WARD VISIT: Performed by: OCCUPATIONAL THERAPIST

## 2017-03-01 PROCEDURE — 85027 COMPLETE CBC AUTOMATED: CPT | Performed by: STUDENT IN AN ORGANIZED HEALTH CARE EDUCATION/TRAINING PROGRAM

## 2017-03-01 PROCEDURE — 81001 URINALYSIS AUTO W/SCOPE: CPT | Performed by: INTERNAL MEDICINE

## 2017-03-01 RX ORDER — CIPROFLOXACIN 250 MG/1
250 TABLET, FILM COATED ORAL
Status: DISCONTINUED | OUTPATIENT
Start: 2017-03-01 | End: 2017-03-01

## 2017-03-01 RX ORDER — LACTULOSE 10 G/15ML
200 SOLUTION ORAL EVERY 4 HOURS PRN
Status: DISCONTINUED | OUTPATIENT
Start: 2017-03-01 | End: 2017-03-04

## 2017-03-01 RX ORDER — HEPARIN SODIUM 5000 [USP'U]/.5ML
5000 INJECTION, SOLUTION INTRAVENOUS; SUBCUTANEOUS EVERY 8 HOURS
Status: DISCONTINUED | OUTPATIENT
Start: 2017-03-01 | End: 2017-03-02

## 2017-03-01 RX ORDER — SODIUM BICARBONATE 650 MG/1
1300 TABLET ORAL 2 TIMES DAILY
Status: DISCONTINUED | OUTPATIENT
Start: 2017-03-01 | End: 2017-03-10 | Stop reason: HOSPADM

## 2017-03-01 RX ADMIN — Medication: at 15:48

## 2017-03-01 RX ADMIN — MICONAZOLE NITRATE: 2 POWDER TOPICAL at 12:02

## 2017-03-01 RX ADMIN — HEPARIN SODIUM 5000 UNITS: 5000 INJECTION, SOLUTION INTRAVENOUS; SUBCUTANEOUS at 20:59

## 2017-03-01 RX ADMIN — LACTULOSE 20 G: 20 SOLUTION ORAL at 23:50

## 2017-03-01 RX ADMIN — RIFAXIMIN 550 MG: 550 TABLET ORAL at 20:43

## 2017-03-01 RX ADMIN — LIDOCAINE 1 PATCH: 50 PATCH TOPICAL at 10:55

## 2017-03-01 RX ADMIN — LACTULOSE 20 G: 20 SOLUTION ORAL at 15:49

## 2017-03-01 RX ADMIN — OMEPRAZOLE 20 MG: 20 CAPSULE, DELAYED RELEASE ORAL at 08:53

## 2017-03-01 RX ADMIN — LACTULOSE 20 G: 20 SOLUTION ORAL at 20:43

## 2017-03-01 RX ADMIN — LACTULOSE 20 G: 20 SOLUTION ORAL at 08:53

## 2017-03-01 RX ADMIN — HEPARIN SODIUM 5000 UNITS: 5000 INJECTION, SOLUTION INTRAVENOUS; SUBCUTANEOUS at 13:45

## 2017-03-01 RX ADMIN — ACETAMINOPHEN 325 MG: 325 TABLET, FILM COATED ORAL at 08:53

## 2017-03-01 RX ADMIN — SODIUM BICARBONATE 650 MG TABLET 1300 MG: at 20:43

## 2017-03-01 RX ADMIN — INSULIN ASPART 1 UNITS: 100 INJECTION, SOLUTION INTRAVENOUS; SUBCUTANEOUS at 23:29

## 2017-03-01 RX ADMIN — LACTULOSE 20 G: 20 SOLUTION ORAL at 17:37

## 2017-03-01 RX ADMIN — LACTULOSE 20 G: 20 SOLUTION ORAL at 12:03

## 2017-03-01 RX ADMIN — LACTULOSE 20 G: 20 SOLUTION ORAL at 13:45

## 2017-03-01 RX ADMIN — CIPROFLOXACIN HYDROCHLORIDE 250 MG: 250 TABLET, FILM COATED ORAL at 08:53

## 2017-03-01 RX ADMIN — ALBUMIN (HUMAN) 75 G: 12.5 SOLUTION INTRAVENOUS at 08:54

## 2017-03-01 RX ADMIN — INSULIN ASPART 2 UNITS: 100 INJECTION, SOLUTION INTRAVENOUS; SUBCUTANEOUS at 20:59

## 2017-03-01 RX ADMIN — INSULIN GLARGINE 15 UNITS: 100 INJECTION, SOLUTION SUBCUTANEOUS at 15:48

## 2017-03-01 RX ADMIN — RIFAXIMIN 550 MG: 550 TABLET ORAL at 08:53

## 2017-03-01 ASSESSMENT — PAIN DESCRIPTION - DESCRIPTORS: DESCRIPTORS: ACHING

## 2017-03-01 NOTE — PROVIDER NOTIFICATION
pt is c/o extreme abdomen pain. refusing to move/get to commode without better pain control. any kind of pain meds available? thx!

## 2017-03-01 NOTE — PROGRESS NOTES
Nephrology Progress Note  03/01/2017       Interval History :   He feels his abdomen is more distended today, feels tired but no other new symptoms. Ultrasound showed reversal of portal flow but no thrombus and no other new findings to explain rising bili. No hydro on kidneys. UA was bland and urine Na <5, urine eos negative. He does not have granular casts on UA.    Assessment & Recommendations:   51yM with hx of CASTAÑEDA cirrhosis/ESLD with portal HTN requiring twice weekly paracentesis, T2DM on insulin, HCC s/p TACE in 9/2016, GERD on PPI, and normal baseline renal function that is admitted with hepatic encephalopathy, hyperglycemia, and decompensated liver disease now with rising Cr.     1. EJ, baseline Cr 0.7-0.8, admission Cr 1.4, acute rise in Cr to 2.4 on 2/27 -> 2.7 -> 2.8. Likely HRS in setting of decompensated liver disease, but also may have bile cast nephropathy. AIN from Cipro less likely but remote possibility and taken off this.   2. Hyperkalemia on admission, resolved, likely 2/2 increased intake and use of spironolactone and decreased renal function  3. Metabolic acidosis, likely 2/2 EJ and lactulose induced stool output  4. Cirrhosis 2/2 CASTAÑEDA, with portal HTN requiring twice weekly paracentesis and grade 1 varices, listed for transplant  5. Hx of HCC s/p TACE 9/2016, repeat MRI with residual tumor but <5cm so still listed for transplant  6. Rising bilirubin, now high 30's, ultrasound without new findings, likely worsening liver disease per hepatology  7. Hepatic encephalopathy, improved with lactulose this admission  8. GERD on PPI  9. Metabolic acidosis 2/2 #1 (presumed primary metabolic, but no blood gas)     Recs:  1. Continue to hold diuretics  2. No role for midodrine currently given BP >100 systolic without  3. Continue volume expansion for another day (today and tomorrow)  4. Limit volume of paracentesis to <5L if able and give albumin 25% at 50-75g with each tap  5. Trend renal function  6.  Start sodium bicarbonate tabs 2 bid (I ordered)     Staffed with Dr. Crowe. Recommendations were communicated to primary team directly.      Doug Elaine  Nephrology Fellow  Pager: [phone]      Review of Systems:   A 4 point review of systems was negative except as noted above.  Notably: poor appetite. no nausea or vomiting or diarrhea.  no confusion no fever or chills    Physical Exam:   /56 (BP Location: Right arm)  Pulse 65  Temp 97.3  F (36.3  C) (Axillary)  Resp 16  Ht 1.829 m (6')  Wt 100.2 kg (221 lb)  SpO2 98%  BMI 29.97 kg/m2     Ins and Outs  Intake/Output Summary (Last 24 hours) at 03/01/17 1610  Last data filed at 03/01/17 1230   Gross per 24 hour   Intake           928.43 ml   Output             1150 ml   Net          -221.57 ml     General: appears very tired  HEENT: +icterus  Heart: RRR, no murmur  Lungs: CTA anterior and posterior  Abd: more distended today but not tense, no pain with palpation  Ext: trace pitting edema  Access : no HD access    Labs:   All of today's labs reviewed.    Imaging:  Today's imaging reviewed.    Current Medications:  All Reviewed    Doug Elaine  Nephrology Fellow  Pager: [phone]    I, Raffi Crowe, saw this patient with Dr. Elaine and agree with the findings and plan of care as documented in the note.

## 2017-03-01 NOTE — PLAN OF CARE
Problem: Goal Outcome Summary  Goal: Goal Outcome Summary  Outcome: No Change  Alert and oriented x4, but demonstrates some illogical thought process. VSS on room air. Abdomen distended and tender with hypoactive bowel sounds. Patient c/o of abdominal pain. Plan for abdominal US today, NPO since midnight. Insulin gtt infusing using algorithm 1. Voiding in urinal with encouragement. BM x1. Continue to monitor and follow plan of care.

## 2017-03-01 NOTE — PROGRESS NOTES
Diabetes Consult Daily  Progress Note          Assessment/Plan:   Mr. Bhagat is a 51 year old with uncontrolled type 2 diabetes and with end stage liver disease admitted to [facility] 2/21/2017 with worsening encephalopathy and concern for infection. He was experiencing persistent hyperglycemia in the setting of likely omitted aspart, building glucose toxicity, and potentially infection.     Insulin needs are dramatically lower in setting of worsened renal function and decreased oral intake.  The hourly insulin need has been quite steady, so though pt to remain NPO, will transition to subcutaneous insulin.     Plan  Give glargine 15 units q24h, then stop Iv insulin infusion 2 hours later  Aspart reduced to 2 units/carb unit to be given if diet is resumed  Aspart medium resistance correction q4h  BG monitor q4h once off insulin infusion      We will follow.  If patient is amenable, will refer for outpatient diabetes follow up at [facility] (this is the desire of his wife and mother).              Interval History:   Previously on glargine up to 70 units w/ BG persisting in 300s.  Two days ago, with increased aspart, sharp rise in creatinine, and IV insulin, the hyperglycemia resolved.  Yesterday, with no glargine, was still tolerating 3-4 units/h.  Did eat a meal.  Since 0200 today, hourly insulin need was only 0.5 units/h for 12 out of13 hours with glucose mostly 140s.  Pt now found to have ileus, so will remain NPO.  Camacho complained of discomfort w/ repositioning this morning.  Denied appetite.      Recent Labs  Lab 03/01/17  1432 03/01/17  1326 03/01/17  1128 03/01/17  0916 03/01/17  0735 03/01/17  0713 03/01/17  0651  02/28/17 2015 02/28/17  0916  02/27/17  1635  02/27/17  0940  02/26/17  0930   GLC  --   --   --   --   --  143*  --   --  136*  --  129*  --  354*  --  334*  --  376*   * 163* 143* 133* 143*  --  128*  < >  --   < > 115*  < >  --   < >  --   < >  --    < > = values in  this interval not displayed.            Review of Systems:   See interval hx          Medications:   Lactulose scheduled and PRN-- past exploration into quantity of sugar was unrevealing (proprietary recipe)    Active Diet Order      NPO Time Specified Meds (he should still get the lactulose even if NPO)     Physical Exam:  Gen:  resting in bed, in NAD, jaundice   HEENT: NC/AT, icteric sclerae, mucous membranes are sl dry  Resp: Unlabored  Neuro: arouse to verbal stimulation, answers appropriately about test completion  /66 (BP Location: Right arm)  Pulse 65  Temp 97.2  F (36.2  C) (Axillary)  Resp 15  Ht 1.829 m (6')  Wt 100.2 kg (221 lb)  SpO2 98%  BMI 29.97 kg/m2           Data:     Lab Results   Component Value Date    A1C 9.4 02/16/2017    A1C 6.2 12/14/2016    A1C 6.1 10/27/2016    A1C 8.3 07/02/2012    A1C 8.5 07/31/2009              Recent Labs   Lab Test  03/01/17 0713  02/28/17 2015   NA  126*  128*   POTASSIUM  3.9  4.1   CHLORIDE  97  96   CO2  15*  18*   ANIONGAP  14  14   GLC  143*  136*   BUN  94*  81*   CR  2.85*  2.71*   JACOB  8.9  8.8     CBC RESULTS:   Recent Labs   Lab Test  03/01/17 0713   WBC  6.1   RBC  2.90*   HGB  9.8*   HCT  28.1*   MCV  97   MCH  33.8*   MCHC  34.9   RDW  15.5*   PLT  45*     Liver Function Studies -   Recent Labs   Lab Test  03/01/17 0713   PROTTOTAL  4.6*   ALBUMIN  3.0*   BILITOTAL  38.8*   ALKPHOS  208*   AST  141*   ALT  83*         Janice Guzman APRN [id number]    Diabetes Management job code [id number]

## 2017-03-01 NOTE — PROVIDER NOTIFICATION
can pt have carb controlled diet now? thx. also, his mother wanted you to know that he doesn't take over the counter meds at home.

## 2017-03-01 NOTE — PROGRESS NOTES
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    Hepatic encephalopathy - CXR on admit negative for PNA, Ascitic fluid negative for SBP, UA negative.  No evidence of GI bleed.  Likely this is related to medication non compliance-   - Rifaximin  - lactulose oral and enema titrated to mental status    Decompensated Cryptogenic cirrhosis, HCC s/p TACE 9/2016  - Listed for transplant  - Daily Meld scores  --> today MELD 37  - Hepatology following  - Infectious work up negative to date  - Hold Cipro 2/2 possible AIN    Acute renal failure - no hydronephrosis on ultrasound.  There still is a possibility of AIN from cipro vs hepatorenal.  - Day #2 of albumin challenge  - Hold diuretics  - Nephrology following appreciate guidance    Urinary retention   - bladder scan with intermittent catheterization as needed    DM II   - continue insulin drip  - Aspart for carb coverage once he starts eating  - endocrinology following        Consulting Services:Hepatology, nephrology, endocrinology    CODE: Full  DVT: Heparin SQ, PPI  Diet/fluids: Regular diet  Disposition: Pending clinical stabilization.      Pt's care was discussed with bedside RN.    Bessy Casanova D.O.  Internal Medicine Staff Hospitalist Service   [facility]          ___________________________________________________________________    Subjective & Interval Hx:  Patient is tired this morning.  He endorses abd discomfort.     Last 24 hr care team notes reviewed.   ROS:  4 point ROS including Respiratory, CV, GI and , other than that noted in the HPI, is negative.    Medications: Reviewed in EPIC. List below for reference    Physical Exam:    Blood pressure 135/66, pulse 65,  temperature 97.2  F (36.2  C), temperature source Axillary, resp. rate 15, height 1.829 m (6'), weight 100.2 kg (221 lb), SpO2 98 %.on room air  General: Resting comfortably, not in any distress  HEENT: Sclera icteric  Chest/Resp: CTA bilaterally  Heart/CV: RRR  Abdomen/GI: Distended but soft, active bowel sounds, no tenderness to palpation  Extremities/MSK: + edema  Skin: Jaundiced  Neuro/Psych: Alert and oriented but slow to respond    Lines/Tubes:   Peripheral IV 03/01/17 Left;Anterior;Medial Lower forearm (Active)   Site Assessment WDL 3/1/2017 12:00 PM   Line Status Saline locked 3/1/2017 12:00 PM   Phlebitis Scale 0-->no symptoms 3/1/2017 12:00 PM   Infiltration Scale 0 3/1/2017 12:00 PM   Extravasation? No 3/1/2017 12:00 PM   Dressing Intervention New dressing  3/1/2017  7:00 AM   Number of days:0       Labs & Studies of Note: I personally reviewed the following studies:     Blood cultures:    Unresulted Labs Ordered in the Past 30 Days of this Admission     Date and Time Order Name Status Description    2/28/2017 1413 Urea nitrogen random urine In process     2/27/2017 1325 Fluid Culture Aerobic Bacterial Preliminary

## 2017-03-01 NOTE — PLAN OF CARE
Problem: Goal Outcome Summary  Goal: Goal Outcome Summary  Pt sleepy but Ox4. VSS on room air. Abd distended, pt had a large BM mixed urine and stool. Pt is complaining of back pain and just wants to sleep. Insulin gtt on alg 1. Pt up with Ax1. Pt turned and repositioned q2hr. Will continue to monitor per plan of care.

## 2017-03-01 NOTE — PROGRESS NOTES
Pt arrive to 6B. Ox4 VSS on room air. Pt is sleepy and moans on and off but able to make needs known and ordered dinner.

## 2017-03-02 ENCOUNTER — APPOINTMENT (OUTPATIENT)
Dept: OCCUPATIONAL THERAPY | Facility: CLINIC | Age: 52
DRG: 005 | End: 2017-03-02
Payer: COMMERCIAL

## 2017-03-02 LAB
ALBUMIN SERPL-MCNC: 3.6 G/DL (ref 3.4–5)
ALP SERPL-CCNC: 194 U/L (ref 40–150)
ALT SERPL W P-5'-P-CCNC: 83 U/L (ref 0–70)
ANION GAP SERPL CALCULATED.3IONS-SCNC: 16 MMOL/L (ref 3–14)
AST SERPL W P-5'-P-CCNC: 128 U/L (ref 0–45)
BILIRUB SERPL-MCNC: 40.4 MG/DL (ref 0.2–1.3)
BUN SERPL-MCNC: 93 MG/DL (ref 7–30)
CALCIUM SERPL-MCNC: 9.1 MG/DL (ref 8.5–10.1)
CHLORIDE SERPL-SCNC: 101 MMOL/L (ref 94–109)
CO2 SERPL-SCNC: 16 MMOL/L (ref 20–32)
CREAT SERPL-MCNC: 2.88 MG/DL (ref 0.66–1.25)
ERYTHROCYTE [DISTWIDTH] IN BLOOD BY AUTOMATED COUNT: 15.5 % (ref 10–15)
GFR SERPL CREATININE-BSD FRML MDRD: 23 ML/MIN/1.7M2
GLUCOSE SERPL-MCNC: 234 MG/DL (ref 70–99)
HCT VFR BLD AUTO: 27.6 % (ref 40–53)
HGB BLD-MCNC: 9.6 G/DL (ref 13.3–17.7)
INR PPP: 1.72 (ref 0.86–1.14)
MCH RBC QN AUTO: 33.7 PG (ref 26.5–33)
MCHC RBC AUTO-ENTMCNC: 34.8 G/DL (ref 31.5–36.5)
MCV RBC AUTO: 97 FL (ref 78–100)
PLATELET # BLD AUTO: 34 10E9/L (ref 150–450)
POTASSIUM SERPL-SCNC: 3.5 MMOL/L (ref 3.4–5.3)
PROT SERPL-MCNC: 5 G/DL (ref 6.8–8.8)
RBC # BLD AUTO: 2.85 10E12/L (ref 4.4–5.9)
SODIUM SERPL-SCNC: 133 MMOL/L (ref 133–144)
UUN UR-MCNC: 550 MG/DL
UUN/CREAT 24H UR: 5 G/G CR
WBC # BLD AUTO: 4 10E9/L (ref 4–11)

## 2017-03-02 PROCEDURE — 25000131 ZZH RX MED GY IP 250 OP 636 PS 637: Performed by: CLINICAL NURSE SPECIALIST

## 2017-03-02 PROCEDURE — 12000006 ZZH R&B IMCU INTERMEDIATE UMMC

## 2017-03-02 PROCEDURE — 25000132 ZZH RX MED GY IP 250 OP 250 PS 637: Performed by: INTERNAL MEDICINE

## 2017-03-02 PROCEDURE — 25000128 H RX IP 250 OP 636: Performed by: INTERNAL MEDICINE

## 2017-03-02 PROCEDURE — 85610 PROTHROMBIN TIME: CPT | Performed by: STUDENT IN AN ORGANIZED HEALTH CARE EDUCATION/TRAINING PROGRAM

## 2017-03-02 PROCEDURE — 99233 SBSQ HOSP IP/OBS HIGH 50: CPT | Mod: GC | Performed by: INTERNAL MEDICINE

## 2017-03-02 PROCEDURE — 97168 OT RE-EVAL EST PLAN CARE: CPT | Mod: GO | Performed by: OCCUPATIONAL THERAPIST

## 2017-03-02 PROCEDURE — 85027 COMPLETE CBC AUTOMATED: CPT | Performed by: STUDENT IN AN ORGANIZED HEALTH CARE EDUCATION/TRAINING PROGRAM

## 2017-03-02 PROCEDURE — 00000146 ZZHCL STATISTIC GLUCOSE BY METER IP

## 2017-03-02 PROCEDURE — 97535 SELF CARE MNGMENT TRAINING: CPT | Mod: GO | Performed by: OCCUPATIONAL THERAPIST

## 2017-03-02 PROCEDURE — 40000133 ZZH STATISTIC OT WARD VISIT: Performed by: OCCUPATIONAL THERAPIST

## 2017-03-02 PROCEDURE — 80053 COMPREHEN METABOLIC PANEL: CPT | Performed by: STUDENT IN AN ORGANIZED HEALTH CARE EDUCATION/TRAINING PROGRAM

## 2017-03-02 PROCEDURE — 36415 COLL VENOUS BLD VENIPUNCTURE: CPT | Performed by: STUDENT IN AN ORGANIZED HEALTH CARE EDUCATION/TRAINING PROGRAM

## 2017-03-02 RX ADMIN — SODIUM BICARBONATE 650 MG TABLET 1300 MG: at 09:31

## 2017-03-02 RX ADMIN — INSULIN ASPART 2 UNITS: 100 INJECTION, SOLUTION INTRAVENOUS; SUBCUTANEOUS at 04:21

## 2017-03-02 RX ADMIN — SODIUM BICARBONATE 650 MG TABLET 1300 MG: at 21:03

## 2017-03-02 RX ADMIN — MICONAZOLE NITRATE: 2 POWDER TOPICAL at 21:03

## 2017-03-02 RX ADMIN — LACTULOSE 20 G: 20 SOLUTION ORAL at 21:00

## 2017-03-02 RX ADMIN — RIFAXIMIN 550 MG: 550 TABLET ORAL at 08:51

## 2017-03-02 RX ADMIN — OMEPRAZOLE 20 MG: 20 CAPSULE, DELAYED RELEASE ORAL at 08:51

## 2017-03-02 RX ADMIN — ACETAMINOPHEN 325 MG: 325 TABLET, FILM COATED ORAL at 09:30

## 2017-03-02 RX ADMIN — MICONAZOLE NITRATE: 2 POWDER TOPICAL at 08:52

## 2017-03-02 RX ADMIN — MICONAZOLE NITRATE: 20 CREAM TOPICAL at 08:53

## 2017-03-02 RX ADMIN — LACTULOSE 20 G: 20 SOLUTION ORAL at 09:30

## 2017-03-02 RX ADMIN — RIFAXIMIN 550 MG: 550 TABLET ORAL at 21:02

## 2017-03-02 RX ADMIN — LACTULOSE 20 G: 20 SOLUTION ORAL at 05:44

## 2017-03-02 RX ADMIN — HEPARIN SODIUM 5000 UNITS: 5000 INJECTION, SOLUTION INTRAVENOUS; SUBCUTANEOUS at 05:44

## 2017-03-02 RX ADMIN — LIDOCAINE 1 PATCH: 50 PATCH TOPICAL at 09:31

## 2017-03-02 RX ADMIN — LACTULOSE 20 G: 20 SOLUTION ORAL at 03:36

## 2017-03-02 ASSESSMENT — PAIN DESCRIPTION - DESCRIPTORS
DESCRIPTORS: CRAMPING;CONSTANT

## 2017-03-02 ASSESSMENT — ACTIVITIES OF DAILY LIVING (ADL): PREVIOUS_RESPONSIBILITIES: MEAL PREP;HOUSEKEEPING;LAUNDRY;SHOPPING;MEDICATION MANAGEMENT;FINANCES;DRIVING

## 2017-03-02 NOTE — PROGRESS NOTES
Diabetes Consult Daily  Progress Note          Assessment/Plan:   Mr. Bhagat is a 51 year old with uncontrolled type 2 diabetes and with end stage liver disease and listed for transplant, admitted to [facility] 2/21/2017 with worsening encephalopathy and concern for infection. He was experiencing persistent hyperglycemia in the setting of likely omitted aspart, building glucose toxicity, and potentially infection (SBP negative) .     Glucose trending higher overnight.       Plan  Add glargine 5 units qAM, move glargine 15 units to qPM  Aspart 2 units/carb unit to be given if diet is resumed  Aspart medium resistance correction q4h--->  1 per 30  Mg/dL glucose   BG monitor q4h    If start supplemental nutrition will need revised insulin plan and potentially insulin infusion again.      We will follow.  If patient is amenable, will refer for outpatient diabetes follow up at [facility] (this is the desire of his wife and mother).              Interval History:   Pt  found to have ileus, so remains NPO.  RD left recs for nutrition supp options today.  Camacho complained fatigue and thirst.  Creatinine continues ~2.8    Diet now resumed.    Recent Labs  Lab 03/02/17  1121 03/02/17  0925 03/02/17  0511 03/02/17  0338 03/01/17  2328 03/01/17  2045 03/01/17  1740  03/01/17  0713  02/28/17  2015  02/28/17  0916  02/27/17  1635  02/27/17  0940   GLC  --   --  234*  --   --   --   --   --  143*  --  136*  --  129*  --  354*  --  334*   * 226*  --  224* 179* 193* 177*  < >  --   < >  --   < > 115*  < >  --   < >  --    < > = values in this interval not displayed.            Review of Systems:   See interval hx          Medications:   Lactulose scheduled and PRN-- past exploration into quantity of sugar was unrevealing (proprietary recipe)    Active Diet Order      Moderate Consistent CHO Diet     Physical Exam:  Gen:  resting in bed, in NAD, jaundice   HEENT: NC/AT, icteric sclerae, mucous membranes are sl  dry  Resp: Unlabored  Neuro: arouse to verbal briefly  BP (!) 124/91 (BP Location: Left arm)  Pulse 65  Temp 96.5  F (35.8  C) (Axillary)  Resp 18  Ht 1.829 m (6')  Wt 102.1 kg (225 lb)  SpO2 98%  BMI 30.52 kg/m2           Data:     Lab Results   Component Value Date    A1C 9.4 02/16/2017    A1C 6.2 12/14/2016    A1C 6.1 10/27/2016    A1C 8.3 07/02/2012    A1C 8.5 07/31/2009              Recent Labs   Lab Test  03/02/17   0511  03/01/17   0713   NA  133  126*   POTASSIUM  3.5  3.9   CHLORIDE  101  97   CO2  16*  15*   ANIONGAP  16*  14   GLC  234*  143*   BUN  93*  94*   CR  2.88*  2.85*   JACOB  9.1  8.9     CBC RESULTS:   Recent Labs   Lab Test  03/02/17   0511   WBC  4.0   RBC  2.85*   HGB  9.6*   HCT  27.6*   MCV  97   MCH  33.7*   MCHC  34.8   RDW  15.5*   PLT  34*         Janice Guzman APRN -[id number]    Diabetes Management job code [id number]

## 2017-03-02 NOTE — PLAN OF CARE
Problem: Goal Outcome Summary  Goal: Goal Outcome Summary  Outcome: No Change  Neuro: A&Ox4, but can have illogical statements at times. Agitated and noncompliant at times. Family was at bedside for a couple hours today  Cardiac: SR with HR in 60s. BP stable.  Respiratory: Sating 100% on RA.  GI/: Pt didn't have bm until 13:00 today. Loose brown bmx3 this shift with scheduled and PRN q2hr lactulose, PO. Abd. Xray showed ileus. NGT available if pt develops n/v. Mixed urine/stool output, straight cathed pt and no urine output.  Diet/appetite: NPO due to ileus. Insulin drip d/c'ed at 18:00 tonight. Gave lantus and does have carb control once pt starts to eat.Q4hr bg with SS.   Activity:  Assist of 1-2 up to commode, sometimes log rolled when pt refuses to move.  Pain: PRN tylenol q4hrs, with minimal relief of pain for abdominal pain. Mds aware of this and came to talk with patient at bedise.  Skin: Intact, no new deficits noted. Jaundice/yellow skin.     R: Continue with POC. Notify primary team with changes. Continue to monitor for BMs, give lactulose enema if needed. Cont to monitor mentation.

## 2017-03-02 NOTE — PROGRESS NOTES
Nephrology Progress Note  03/02/2017       Interval History :   More distended today, BP adequate, has gotten albumin loaded daily now and Cr still slowly rising, bili now up to 40. On liver transplant list, MELD 38. No other new symptoms except ongoing fatigue and poor appetite.    Assessment & Recommendations:   51yM with hx of CASTA[place]EDA cirrhosis/ESLD with portal HTN requiring twice weekly paracentesis, T2DM on insulin, HCC s/p TACE in 9/2016, GERD on PPI, and normal baseline renal function that is admitted with hepatic encephalopathy, hyperglycemia, and decompensated liver disease now with rising Cr.     1. EJ, baseline Cr 0.7-0.8, admission Cr 1.4, acute rise in Cr to 2.4 on 2/27 -> 2.7 -> 2.8 -> 2.8. Likely HRS in setting of decompensated liver disease, but also may have bile cast nephropathy. AIN from Cipro less likely but remote possibility and taken off this.   2. Hyperkalemia on admission, resolved, likely 2/2 increased intake and use of spironolactone and decreased renal function  3. Metabolic acidosis, likely 2/2 EJ and lactulose induced stool output  4. Cirrhosis 2/2 CASTA[place]EDA, with portal HTN requiring twice weekly paracentesis and grade 1 varices, listed for transplant  5. Hx of HCC s/p TACE 9/2016, repeat MRI with residual tumor but <5cm so still listed for transplant  6. Rising bilirubin, now high 30's, ultrasound without new findings, likely worsening liver disease per hepatology  7. Hepatic encephalopathy, improved with lactulose this admission  8. GERD on PPI  9. Metabolic acidosis 2/2 #1 (presumed primary metabolic, but no blood gas)     Recs:  1. Hold diuretics, but no more volume expansion at this point  2. Ok for paracentesis, limit to <5L and give 50-75g albumin with taps  3. Avoid Cipro for now  4. Trend I/O's, weight, and BMP  5. If requires dialysis, would pursue this given that he is on transplant list and MELD 38. Hopefully can avoid dialysis, but concern that he doesn't have a reversible renal  condition without a liver transplant.     Staffed with Dr. Crowe.      Doug Elaine  Nephrology Fellow  Pager: [phone]      Review of Systems:   A 4 point review of systems was negative except as noted above.  Notably: poor appetite. no nausea or vomiting or diarrhea.  no confusion no fever or chills    Physical Exam:   /84 (BP Location: Left arm)  Pulse 65  Temp 97.7  F (36.5  C) (Oral)  Resp 18  Ht 1.829 m (6')  Wt 102.1 kg (225 lb)  SpO2 100%  BMI 30.52 kg/m2     Ins and Outs  Intake/Output Summary (Last 24 hours) at 03/02/17 1639  Last data filed at 03/02/17 1600   Gross per 24 hour   Intake              975 ml   Output              600 ml   Net              375 ml     General: tired appearing  HEENT: +icterus  Heart: RRR, no murmur  Lungs: CTA anterior and posterior  Abd: more distended again today, not tense but quite distended  Ext: trace pitting edema  Access : no HD access    Labs:   All of today's labs reviewed.    Imaging:  Today's imaging reviewed.    Current Medications:  All Reviewed    Doug Elaine  Nephrology Fellow  Pager: [phone]  I, Raffi Crwoe, saw this patient with Dr. Elaine on 3/2/2017 and agree with the findings and plan of care as documented in the note.

## 2017-03-02 NOTE — PLAN OF CARE
Problem: Goal Outcome Summary  Goal: Goal Outcome Summary  Outcome: No Change  Neuro: A&Ox4. Agitated and noncompliant at times. Family was at bedside for a couple hours today. Pt refuses lactulose at times.  Cardiac: SR with HR in 60s. BP stable.  Respiratory: Sating 100% on RA.  GI/: Loose brown bmx5 today this shift with scheduled and PRN q2hr lactulose, PO. Mixed urine/stool output. Pt c/o nausea and extreme abdominal discomfort. Abdomen very full and distended with ascites.   Diet/appetite: Advanced from NPO to carb controlled diet today. Tolerating PO intake. Fluid restriction of 1500mL/24hrs. q4 hr blood sugars with Q4hr SS insulin, scheduled lantus, and achs carb counts.    Activity: Assist of 1-2 up to commode, sometimes log rolled when pt refuses to move. Weak on feet, PT consulted and saw him today.  Pain: PRN tylenol q4hrs, with minimal relief of pain for abdominal pain. Mds aware of this.  Skin: Intact, no new deficits noted. Jaundice/yellow skin. Slight BLE edema.      R: Continue with POC. Notify primary team with changes. Continue to monitor for BMs, give scheduled and prn lactulose as needed. Cont to monitor mentation. Possible para tomorrow? Monitor EJ levels.

## 2017-03-02 NOTE — PLAN OF CARE
Problem: Goal Outcome Summary  Goal: Goal Outcome Summary  Patient alert and oriented x's 3, illogical and forgetful. Gets very agitated and non-compliant. Vitals stable, on RA, in NSR, afebrile. Complains of abdominal pain and wanting a paracentesis soon. Lactulose given scheduled once and 3 times PRN. 4 BM's in last 12 hours. Incontinent at times. NPO, patient demanded fluids at times, no nausea. Voiding adequately. Blood sugars 170's-200's. Up to commode with 2 assist. Will continue to monitor.

## 2017-03-02 NOTE — PLAN OF CARE
Problem: Goal Outcome Summary  Goal: Goal Outcome Summary  OT 6B Reevaluation with re initiation of  OT , pt hospitalization was prolonged and pt had a decline in function from eval. Reinitiate with updated goals.  Pt has declined in function physically and cognitively, now needing Ax2 for up to chair.   Rec TCU to improve cognitive compensation and strength for ADLs and transfers pre transplant

## 2017-03-02 NOTE — PROGRESS NOTES
CLINICAL NUTRITION SERVICES - REASSESSMENT NOTE     Nutrition Prescription    RECOMMENDATIONS FOR MDs/PROVIDERS TO ORDER:  1. If able to continue diet order from a GI standpoint, rec adjust diet to a High Consistent Carbohydrate diet. Monitor need to add low-sodium diet restriction if needed (fluid status) or 3 g K+ diet restriction (if K+ trends high) as a Combination Diet. Order kcal counts if continues to eat less than 50% of meals.  2. Monitor lytes (Phos, Mg++, and K+) for refeeding syndrome.  If lytes trend low, aggressively replace.      Malnutrition Status:    Non-severe malnutrition (minimally) in the context of chronic illness    Recommendations already ordered by Registered Dietitian (RD):  None at this time.    Future/Additional Recommendations:  1.  Consider checking thiamine level. If low, rec restart supplementation.   2.  Consider ordering zinc supplementation due to lactulose and stooling.   3.  Consider ordering multivitamin without minerals due to inadequate nutrition intake.   4.  When appropriate, consider assessing iron status to assess possible need for supplementation.  5.  Monitor BG control. Hgb A1c of 9.2 on 2/16/17. Has a h/o DM II. Alert Endo team if starting TFs or parenteral nutrition (PN).   6.  If oral intake is not improving and if ok for TFs from a GI perspective, would rec place feeding tube (note, varices hx) and initiate TFs, TwoCal HN (concentrated TF formula).  Initiate TFs at 10 mL/hr, advancing rate by 10 mL Q 8 hrs (or per provider discretion) to goal rate of 55 mL/hr. This provides 1320 mL TF, 2640 kcals, 111 g PRO, 924 ml free H2O, 289 g CHO and 7 g Fiber daily. If K+ trends high, rec Nepro with goal rate of 55 mL/hr.      If begin tube feeds:    - Flush feeding tube (FT) with 30 mL water Q 4 hrs for patency. Rec provider adjust free water flushes as needed, pending fluid status.   - Ensure K+/Mg++/Phos labs are ordered daily until TFs advance to goal infusion to evaluate  for sx of refeeding with nutrition received.  If lytes trend low, aggressively replace lytes.       - If not already ordered, order a multivitamin without minerals to help ensure micronutrient needs being met with suspected hypermetabolic demands and potential interruptions to TF infusions.   - Monitor TF and possible need to adjust nutrition support regimen if necessary, pending medical course and nutrition status. Monitor renal function and potential need to adjust recs.      7.  If GI status worsens and if needs parenteral nutrition (PN) support therapy, would rec the following:  Dosing wt:  86 kg, 1800 mL PN/day (rec further concentrate if able to ~1320 mL/day) of 150 g dex, 105 g AA, and 250 mL of 20% IV lipids daily. If Phos is 2 or greater, K+/Mg++ WNL, and glycemic control acceptable, increase dextrose by 75 g/day to goal dextrose of 365 g/day.  This provides 2161 kcals (25 kcals/kg), 105 g protein (1.2 g protein/kg), 365 g CHO, GIR 2.9, and 23% of kcals from fat on average daily. Monitor renal function and possible need to adjust AA.      EVALUATION OF THE PROGRESS TOWARD GOALS   Diet: Moderate Consistent Carbohydrate diet just reordered this morning. Has orders for Ensure Clear (apple at 10:00 and berry at 14:00) and a prn order for Nepro or Ensure Clear with meals. Has orders for a 1.5 L fluid restriction and a 2 L fluid restriction.    Diet history: NPO yesterday morning through this morning due to possible ileus. Had been on a Moderate Consistent Carbohydrate diet prior.   Intake: No appetite per chart. Per nursing flowsheets, fair to good appetite consuming % of meals on 2/22, 100% of meals on 2/23, 100% of meals with a good appetite 2/25, and 0% of meals 2/28. Per [employer], last meals ordered were 2/28. [employer] indicates foods/beverages received from room service on 2/26 totaled 931 kcals and 48 g g protein, 1844 kcals and 61 g protein on 2/27, and 1097 kcals and 36 g protein on 2/28.  This averaged 1291 kcals and 48 g protein/day, which if consumed 100% of these meals and beverages, does not meet estimated needs of 6022-3724 kcals/day (25 - 30 kcals/kg) and 103-129 grams protein/day (1.2 - 1.5 grams of pro/kg).  Nursing notes indicate non-compliant at times and demanding fluids. Pt was sleeping during attempts x 2 to see. Per chart, no nausea. Suspect nutrition intake impacted by GI sx, including abdominal pain, mental status, and lack of appetite. Has had early satiety this admission.       NEW FINDINGS   Distended abdomen per chart and abdominal fullness per RN. Per AXR 3/1, possible ileus vs colonic obstruction.     MALNUTRITION  % Intake: </= 50% for >/= 5 days (severe)  % Weight Loss: Difficult to assess with fluid status, paracentesis  Subcutaneous Fat Loss: None observed, but difficult to assess with body habitus and pt sleeping.  Muscle Loss: Thoracic region (clavicle, acromium bone, deltoid, trapezius, pectoral):  Mild and Upper arm (bicep, tricep):  Mild. Difficult to assess with body habitus and pt sleeping.  Fluid Accumulation/Edema: Mild (although, possibly moderate). Ascites present.   Malnutrition Diagnosis: Non-severe malnutrition (minimally) in the context of chronic illness    Previous Goals   Patient will verbalize 3 foods high in potassium and 3 foods low in potassium.  Evaluation: Unable to evaluate. Pt sleeping. Also, unknown if pt would be able to verbalize due to mental status.    Patient to consume % of nutritionally adequate meal trays TID, or the equivalent with supplements/snacks.   Evaluation: Not met    Previous Nutrition Diagnosis  Inadequate oral intake related to intolerance to eating (nausea, early satiety) as evidenced by patient report of poor tolerance.    Evaluation: Unresolved. Updated below.    CURRENT NUTRITION DIAGNOSIS  Inadequate oral intake related to abdominal pain, mental status, lack of appetite, and likely early satiety as evidenced by pt  last meal ordered were ~48 hours ago and three-day average of meals/beverage from room service totaled 1291 kcals and 48 g protein/day, which if consumed 100% of these meals and beverages, does not meet estimated needs of 6704-9402 kcals/day (25 - 30 kcals/kg) and 103-129 grams protein/day (1.2 - 1.5 grams of pro/kg).      INTERVENTIONS  Implementation  1. Collaboration with other providers: Paged team regarding regarding poor nutrition intake and nutrition note today with nutrition support recs. PharmD aware of PN recs if needed.   2. Nutrition education for nutrition relationship to health/disease: Left handout on the potassium amounts of foods and beverages on the room service menu.    Goals  Patient to consume % of nutritionally adequate meal trays TID, or the equivalent with supplements/snacks.    Monitoring/Evaluation  Progress toward goals will be monitored and evaluated per protocol.     Juanita Izquierdo, MS, RD, LD, CNSC   Pgr:  [phone]    Nutrition will continue to follow. 6B MARÍA pgr: [phone]

## 2017-03-02 NOTE — PROGRESS NOTES
[facility]  HEPATOLOGY PROGRESS NOTE  Camacho Bhagat [id number]     SUBJECTIVE:  Complains of abdominal discomfort related to increased BM's with lactulose. Denies fevers, nausea or vomiting.     OBJECTIVE:  VS: BP (!) 124/91 (BP Location: Left arm)  Pulse 65  Temp 96.5  F (35.8  C) (Axillary)  Resp 18  Ht 1.829 m (6')  Wt 102.1 kg (225 lb)  SpO2 98%  BMI 30.52 kg/m2   General: In no acute distress, mild facial muscle wasting  Neuro: Responds to questions, denies  No asterixis  HEENT:  Moderate scleral icterus  CV: S1/S2with gr 2/4 murmurs. Skin warm and dry  Lungs: clear to auscultation Respirations even and nonlabored on room air  Abd: Nontender, moderately distended +BS  Extrem: Trace lower peripehral edema  Skin: Moderate jaundice    REVIEW OF LABORATORY, PATHOLOGY AND IMAGING RESULTS:  BMP    Recent Labs  Lab 03/02/17  0511 03/01/17 0713 02/28/17 2015 02/28/17  0916    126* 128* 126*   POTASSIUM 3.5 3.9 4.1 4.0   CHLORIDE 101 97 96 95   JACOB 9.1 8.9 8.8 9.1   CO2 16* 15* 18* 17*   BUN 93* 94* 81* 76*   CR 2.88* 2.85* 2.71* 2.73*   * 143* 136* 129*     CBC    Recent Labs  Lab 03/02/17  0511 03/01/17  0713 02/28/17 2015 02/28/17  0916   WBC 4.0 6.1 6.5 5.6   RBC 2.85* 2.90* 3.05* 2.93*   HGB 9.6* 9.8* 10.5* 10.0*   HCT 27.6* 28.1* 29.0* 28.5*   MCV 97 97 95 97   MCH 33.7* 33.8* 34.4* 34.1*   MCHC 34.8 34.9 36.2 35.1   RDW 15.5* 15.5* 15.3* 15.4*   PLT 34* 45* 47* 43*     INR    Recent Labs  Lab 03/02/17  0511 03/01/17  0713 02/28/17  0916 02/27/17  0940   INR 1.72* 1.80* 1.61* 1.61*     LFTs    Recent Labs  Lab 03/02/17  0511 03/01/17  0713 02/28/17  0916 02/27/17  0940   ALKPHOS 194* 208* 207* 248*   * 141* 135* 137*   ALT 83* 83* 86* 83*   BILITOTAL 40.4* 38.8* 36.3* 37.9*   PROTTOTAL 5.0* 4.6* 5.1* 4.7*   ALBUMIN 3.6 3.0* 3.1* 2.7*      PANC  No lab results found in last 7 days.   MELD-Na score: 37 at 3/2/2017  5:11 AM  MELD score: 37 at 3/2/2017  5:11 AM  Calculated from:  Serum  Creatinine: 2.88 mg/dL at 3/2/2017  5:11 AM  Serum Sodium: 133 mmol/L at 3/2/2017  5:11 AM  Total Bilirubin: 40.4 mg/dL at 3/2/2017  5:11 AM  INR(ratio): 1.72 at 3/2/2017  5:11 AM  Age: 51 years      Imaging Results:  MRI w/wo 1/6/17  IMPRESSION:  1. Cirrhotic appearance of the liver with evidence of portal  hypertension including splenomegaly and small-to-moderate ascites.  2. Hepatic segment 3 previously treated lesion. The overall treatment  zone size is decreased, though the degree of enhancing soft tissue  within the treatment zone is not substantially changed. This low-level  enhancing tissue continues to remain concerning for malignancy and  attention on short-term follow-up is needed. OPTN 5T.  3. Based on this exam alone, the patient is within [place] criteria.    US 3/1/17  Impression:   1. Cirrhosis with redemonstration of previously treated segment 3  hepatocellular carcinoma. Evaluation for residual disease is limited  on the current exam.  2. Sequela of portal hypertension including splenomegaly and  small-volume ascites.  3. No evidence of portal venous thrombosis, however new retrograde  flow throughout the portal venous system compared to prior ultrasound  of 10/27/2016.  4. No hydronephrosis as questioned.    IMPRESSION:  Camacho Bhgaat is a 51 year old male with a history of CASTAÑEDA cirrhosis, HCC s/p TACE/RFA, SBP, ascites, HE, HLD, HTN, and DM II who was admitted with evidence of HE. He is listed for liver transplant but currently on hold due to need for cardiology clearance. He has had mild improvement in his mentation and has been started on empiric treatment of SBP. His sodium level has improved with hydration.     RECOMMENDATIONS:   1. Hepatic encephalopathy  - continue lactulose, titrating for 3-5 stools per day  - rifaximin 550 mg BID  - infectious work up so far negative     2. Ascites  3. SBP hx  - para 2/27 without SBP  Prior sample negative for SBP  - if negative, will need SBP ppx with  ciprofloxacin- 250 mg qod  - due to kidney function, limit para volume to 4 liters.   - May need more frequent small volume ricardo for comfort.      4. CASTAÑEDA cirrhosis  5. HCC  6. Transplant candidacy  - listed for transplant with MELD of 38 and now active. Current MELD 37.  RHC 2/24 with fluid volume overload. Cardiology cleared  - kidney function limiting being able to perform MRI- worsening bili can be related to liver failure.   - Continue with daily MELD labs.    - No evidence of PVT on US     8. Malnutrition  - monitor calorie count, may need NG tube if not taking in calories.     9. Debility  - PT/OT following  - encouraged patient to continue with mobilizing.        Plan of care discussed with Dr. Santana.    Thank you for the opportunity to be involved with  Camacho Bhagat [facility]. Please call with any questions or concerns.    LAMIN Castañeda, CNP  [phone]

## 2017-03-02 NOTE — PROGRESS NOTES
[facility]  Daily Progress Note      Date of Admission: 2/21/2017  Date of Service: 03/02/2017    Camacho Bhagat MRN# [id number]   Age: 51 year old YOB: 1965     Interval History     No acute events overnight. Continues to feel sleepy.  Denies any shortness of breath, abdominal pain, fever, chills.  Stable vitals.  Afebrile. Having goal stool output with lactulose.      Medications     Medications and [facility].  Changes are reflected in the assessment and plan.      Physical Exam   /58 (BP Location: Right arm)  Pulse 65  Temp 97.9  F (36.6  C) (Axillary)  Resp 15  Ht 1.829 m (6')  Wt 102.1 kg (225 lb)  SpO2 99%  BMI 30.52 kg/m2    Wt Readings from Last 1 Encounters:   03/02/17 102.1 kg (225 lb)    Body mass index is 30.52 kg/(m^2).     Physical Exam   Somnolent, oriented to person, place, and time. No acute distress.  Resting in bed comfortably.   HENT: MM moist.    Head: Normocephalic and atraumatic.   Eyes: EOM are normal. Pupils are equal, round, and reactive to light. Scleral icterus is present.   Neck: Neck supple.   Cardiovascular: Normal rate and regular rhythm.   Pulmonary/Chest: Effort normal and breath sounds normal. No respiratory distress. He has no wheezes.   Abdominal: Soft. Bowel sounds are hypoactive. There is no tenderness. There is no rebound and no guarding. Distended, no fluid wave present.  Musculoskeletal: Normal range of motion. He exhibits no edema.   Neurological: He is somnolent, but oriented to person, place and time.  No gross focal findings.    Skin: Skin is warm. No erythema.     Drains and Tubes: None.      Intravascular Access and Device: Peripheral IV x 2     Data     Labs & Studies of Note: I personally reviewed the following studies:    Imaging:   Recent Results (from the past 24 hour(s))   US Abdomen Complete w Doppler Complete    Narrative    EXAMINATION: US ABDOMEN COMPLETE WITH DOPPLER, 3/1/2017 10:18 AM      COMPARISON: Multiple priors, the most recent MRI from 1/6/2017 and  ultrasound from 10/27/2016.    HISTORY: History of HCC. Recess liver with increasing bilirubin,  evaluate for portal vein thrombus, and evaluate kidneys for  hydronephrosis.    TECHNIQUE: The abdomen was scanned in standard fashion with  specialized ultrasound transducer(s) using both gray-scale, color  Doppler, and spectral flow techniques.    Findings:    Liver: The liver demonstrates coarse heterogeneous echogenicity.  Redemonstration of previously treated hepatocellular carcinoma in  segment 3 with a heterogeneously echogenic mass measuring 4.5 x 3.5 x  3.8 cm.     Extrahepatic portal vein flow is retrograde, previously antegrade,  measuring 37 cm/sec.  Right portal vein flow is retrograde, measuring 7 cm/sec.  Left portal vein flow is retrograde, measuring 18 cm/sec.  Recanalized periumbilical vein.    Flow in the hepatic artery is towards the liver and:  79 cm/sec peak systolic  0.85 resistive index.     The splenic vein is obscured. The left, middle, and right hepatic  veins are patent with flow towards the IVC. The IVC is patent with  flow towards the heart.  The visualized aorta is not dilated.    Gallbladder: Surgically absent.    Bile Ducts: No significant intrahepatic biliary ductal dilatation. The  common bile duct was not well visualized.      Pancreas: Obscured by overlying bowel gas.    Kidneys: Both kidneys are of normal echotexture, without mass or  hydronephrosis.   The craniocaudal dimensions are: right- 15.6 cm,  left- 14.3 cm.    Spleen: The spleen measures 20 cm in sagittal dimension.    Fluid: Small-volume ascites.      Impression    Impression:   1.  Cirrhosis with redemonstration of previously treated segment 3  hepatocellular carcinoma. Evaluation for residual disease is limited  on the current exam.  2.  Sequela of portal hypertension including splenomegaly and  small-volume ascites.  3.  No evidence of portal venous  thrombosis, however new retrograde  flow throughout the portal venous system compared to prior ultrasound  of 10/27/2016.  4.  No hydronephrosis as questioned.    DIAZ NORMAN MD   XR Abdomen Port 1 View    Narrative    XR ABDOMEN PORT F1 VW  3/1/2017 1:13 PM      HISTORY: r/o SBO    COMPARISON: MR 1/6/2017 and CT 10/26/2016.    FINDINGS: Multiple supine radiographs of the abdomen. Cholecystectomy  clips. IVC filter. The colon is dilated and filled with air. Single  loop of mildly dilated small bowel visualized in the lateral left  abdomen. No pneumatosis, or portal venous gas. High density projecting  over the mid abdomen likely corresponds to lipiodol within treated  liver lesion. Interstitial opacities in the lower lungs unchanged.      Impression    IMPRESSION: Air distended colon with mildly dilated small bowel loop.  Ileus versus less likely distal colonic obstruction.    I have personally reviewed the examination and initial interpretation  and I agree with the findings.    DIAZ NORMAN MD         Main Plans for Today   - Continue close monitoring for worsening Hepatic encephalopathy.    - Trend MELD labs daily.    - Listed for transplant.      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 Now admitted again due to concern for worsening encephalopathy and infection.       Hepatic Encephalopathy: On admission concern for infection vs. Non compliance with lactulose. Head CT on admission was negative for any acute intracranial process.   - Lactulose 3 times daily - PO. Titrate for 3-5 stools per day.   - Rifaximin 550 mg bid  - PRN Lactulose PO vs enemas per mental status.   - Nursing to continue HE protocol. He continues to have somonlence - nursing continuing to encourage lactulose  compliance      Cryptogenic cirrhosis/CASTAÑEDA   HCC S/P TACE 09/2016:   Recurrent Ascites/SBP:   - US with dopplers 03/1/17: No evidence of portal vein thrombus.    - D/C Zosyn 02/24 as there is no clear sign of infection. Ciprofloxacin held due to concern for AIN/rising creatinine.    - Diagnostic paracentesis 02/23 - negative for SBP  - Diagnostic/therapeutic 02/28 para negative for SBP - removed 4 liters.   - UA 2/27 negative.   - Hepatology consulted - appreciate recs.   - Listed for transplant.    - MRI abdomen w/wo contrast pending improvement of creatinine to evaluate for recurrence of HCC       Acute Kidney Injury - possible due to AIN vs bilirubin cast nephropathy in setting of diuretics:   Hyponatremia (hypervolemic):   Hyperkalemia(resolved):   : Pt with recurrent issues with hyperkalemia - thought to be related to his ACEI and Aldactone - however he has been discontinued on this medication on recent discharges. K was 6 on admission - now improved.   - Nephrology now consulted for worsening cr from 1.3 to 2.7  - RHC on 2/24 with increased filling pressures.   - Rise in creatinine - DDx includes AIN, vs. Bilirubin cast nephropathy in the setting of diuretics.    - No hydronephrosis on US 3/1  - Bladder scan and monitor post void residuals.   - UA with no clear signs of infection.   - Trend Cr. MRI pending improvement of Creatinine.  - Strict I/Os. Daily weights.         Urinary retention  H/O Scrotal edema - Has been recent evaluated by urology. Intermittently has needed a vazquez catheter due to retention on recent admissions. Recurrent issue again requiring vazquez catheter placement on admit. Evaluated for a scrotal mass on last admit - signs of possible inflammatory changes without a drainable fluid collection.   - Recurrent scrotal irritation - no signs of infection - topical antifungals PRN  - Monitor for difficulty voiding - may require vazquez catheter.   - Post void residual PRN to ensure he is not  retaining.       Type II DM:  - Endocrine consulted 2/27- appreciate recs.    - Insulin gtt d/c'd - transitioned to subq Insulin on 3/2  - Hypoglycemia protocol     ------------------------------------------------------------------------------------------------------------------  Prophylaxis:   -GI: PPI  -DVT: None due to bleeding risk.       FEN: Mod carbohydrate diet - Nutrition consulted to help with dietary rec given recurrent hyperkalemia.   Consults: Hepatology, dietician, Cardiology.   Family: Will update as needed.    Code status: Full code.   Disposition: Pending further transplant work up, improvement of kidney function, and further eval of worsening liver disease.   =========================================================  Patient was discussed and evaluated with Dr. Edilberto Martin MD    PGY2  [phone]

## 2017-03-03 ENCOUNTER — RESULTS ONLY (OUTPATIENT)
Dept: OTHER | Facility: CLINIC | Age: 52
End: 2017-03-03

## 2017-03-03 ENCOUNTER — APPOINTMENT (OUTPATIENT)
Dept: GENERAL RADIOLOGY | Facility: CLINIC | Age: 52
DRG: 005 | End: 2017-03-03
Attending: TRANSPLANT SURGERY
Payer: COMMERCIAL

## 2017-03-03 ENCOUNTER — APPOINTMENT (OUTPATIENT)
Dept: GENERAL RADIOLOGY | Facility: CLINIC | Age: 52
DRG: 005 | End: 2017-03-03
Attending: INTERNAL MEDICINE
Payer: COMMERCIAL

## 2017-03-03 ENCOUNTER — ORGAN (OUTPATIENT)
Dept: TRANSPLANT | Facility: CLINIC | Age: 52
End: 2017-03-03

## 2017-03-03 ENCOUNTER — APPOINTMENT (OUTPATIENT)
Dept: OCCUPATIONAL THERAPY | Facility: CLINIC | Age: 52
DRG: 005 | End: 2017-03-03
Payer: COMMERCIAL

## 2017-03-03 ENCOUNTER — APPOINTMENT (OUTPATIENT)
Dept: PHYSICAL THERAPY | Facility: CLINIC | Age: 52
DRG: 005 | End: 2017-03-03
Attending: INTERNAL MEDICINE
Payer: COMMERCIAL

## 2017-03-03 ENCOUNTER — ANESTHESIA EVENT (OUTPATIENT)
Dept: SURGERY | Facility: CLINIC | Age: 52
DRG: 005 | End: 2017-03-03
Payer: COMMERCIAL

## 2017-03-03 LAB
ALBUMIN SERPL-MCNC: 3 G/DL (ref 3.4–5)
ALBUMIN SERPL-MCNC: 3 G/DL (ref 3.4–5)
ALBUMIN UR-MCNC: NEGATIVE MG/DL
ALP SERPL-CCNC: 200 U/L (ref 40–150)
ALP SERPL-CCNC: 210 U/L (ref 40–150)
ALT SERPL W P-5'-P-CCNC: 72 U/L (ref 0–70)
ALT SERPL W P-5'-P-CCNC: 80 U/L (ref 0–70)
AMYLASE SERPL-CCNC: 70 U/L (ref 30–110)
ANION GAP SERPL CALCULATED.3IONS-SCNC: 14 MMOL/L (ref 3–14)
ANION GAP SERPL CALCULATED.3IONS-SCNC: 17 MMOL/L (ref 3–14)
APPEARANCE UR: ABNORMAL
APTT PPP: 44 SEC (ref 22–37)
AST SERPL W P-5'-P-CCNC: 116 U/L (ref 0–45)
AST SERPL W P-5'-P-CCNC: 122 U/L (ref 0–45)
BASOPHILS # BLD AUTO: 0 10E9/L (ref 0–0.2)
BASOPHILS NFR BLD AUTO: 0.3 %
BILIRUB SERPL-MCNC: 35.2 MG/DL (ref 0.2–1.3)
BILIRUB SERPL-MCNC: 37.7 MG/DL (ref 0.2–1.3)
BILIRUB UR QL STRIP: ABNORMAL
BUN SERPL-MCNC: 107 MG/DL (ref 7–30)
BUN SERPL-MCNC: 99 MG/DL (ref 7–30)
CALCIUM SERPL-MCNC: 8.5 MG/DL (ref 8.5–10.1)
CALCIUM SERPL-MCNC: 8.7 MG/DL (ref 8.5–10.1)
CHLORIDE SERPL-SCNC: 100 MMOL/L (ref 94–109)
CHLORIDE SERPL-SCNC: 98 MMOL/L (ref 94–109)
CO2 SERPL-SCNC: 16 MMOL/L (ref 20–32)
CO2 SERPL-SCNC: 17 MMOL/L (ref 20–32)
COLOR UR AUTO: ABNORMAL
CREAT SERPL-MCNC: 2.75 MG/DL (ref 0.66–1.25)
CREAT SERPL-MCNC: 2.78 MG/DL (ref 0.66–1.25)
DIFFERENTIAL METHOD BLD: ABNORMAL
EOSINOPHIL # BLD AUTO: 0.1 10E9/L (ref 0–0.7)
EOSINOPHIL NFR BLD AUTO: 1.8 %
ERYTHROCYTE [DISTWIDTH] IN BLOOD BY AUTOMATED COUNT: 15.3 % (ref 10–15)
ERYTHROCYTE [DISTWIDTH] IN BLOOD BY AUTOMATED COUNT: 15.4 % (ref 10–15)
FIBRINOGEN PPP-MCNC: 192 MG/DL (ref 200–420)
GFR SERPL CREATININE-BSD FRML MDRD: 24 ML/MIN/1.7M2
GFR SERPL CREATININE-BSD FRML MDRD: 24 ML/MIN/1.7M2
GLUCOSE SERPL-MCNC: 220 MG/DL (ref 70–99)
GLUCOSE SERPL-MCNC: 371 MG/DL (ref 70–99)
GLUCOSE UR STRIP-MCNC: NEGATIVE MG/DL
HCT VFR BLD AUTO: 26.5 % (ref 40–53)
HCT VFR BLD AUTO: 26.8 % (ref 40–53)
HGB BLD-MCNC: 9.2 G/DL (ref 13.3–17.7)
HGB BLD-MCNC: 9.3 G/DL (ref 13.3–17.7)
HGB UR QL STRIP: NEGATIVE
IMM GRANULOCYTES # BLD: 0 10E9/L (ref 0–0.4)
IMM GRANULOCYTES NFR BLD: 0 %
INR PPP: 1.73 (ref 0.86–1.14)
INR PPP: 1.82 (ref 0.86–1.14)
KETONES UR STRIP-MCNC: NEGATIVE MG/DL
LEUKOCYTE ESTERASE UR QL STRIP: NEGATIVE
LYMPHOCYTES # BLD AUTO: 0.4 10E9/L (ref 0.8–5.3)
LYMPHOCYTES NFR BLD AUTO: 11.4 %
MAGNESIUM SERPL-MCNC: 2.7 MG/DL (ref 1.6–2.3)
MCH RBC QN AUTO: 33.5 PG (ref 26.5–33)
MCH RBC QN AUTO: 33.8 PG (ref 26.5–33)
MCHC RBC AUTO-ENTMCNC: 34.7 G/DL (ref 31.5–36.5)
MCHC RBC AUTO-ENTMCNC: 34.7 G/DL (ref 31.5–36.5)
MCV RBC AUTO: 96 FL (ref 78–100)
MCV RBC AUTO: 97 FL (ref 78–100)
MONOCYTES # BLD AUTO: 0 10E9/L (ref 0–1.3)
MONOCYTES NFR BLD AUTO: 1 %
MUCOUS THREADS #/AREA URNS LPF: PRESENT /LPF
NEUTROPHILS # BLD AUTO: 3.3 10E9/L (ref 1.6–8.3)
NEUTROPHILS NFR BLD AUTO: 85.5 %
NITRATE UR QL: NEGATIVE
NRBC # BLD AUTO: 0 10*3/UL
NRBC BLD AUTO-RTO: 0 /100
PH UR STRIP: 5 PH (ref 5–7)
PHOSPHATE SERPL-MCNC: 4.6 MG/DL (ref 2.5–4.5)
PLATELET # BLD AUTO: 34 10E9/L (ref 150–450)
PLATELET # BLD AUTO: 38 10E9/L (ref 150–450)
POTASSIUM SERPL-SCNC: 3.5 MMOL/L (ref 3.4–5.3)
POTASSIUM SERPL-SCNC: 3.7 MMOL/L (ref 3.4–5.3)
PROT SERPL-MCNC: 4.7 G/DL (ref 6.8–8.8)
PROT SERPL-MCNC: 4.9 G/DL (ref 6.8–8.8)
RBC # BLD AUTO: 2.72 10E12/L (ref 4.4–5.9)
RBC # BLD AUTO: 2.78 10E12/L (ref 4.4–5.9)
RBC #/AREA URNS AUTO: 1 /HPF (ref 0–2)
SODIUM SERPL-SCNC: 129 MMOL/L (ref 133–144)
SODIUM SERPL-SCNC: 131 MMOL/L (ref 133–144)
SP GR UR STRIP: 1.01 (ref 1–1.03)
URN SPEC COLLECT METH UR: ABNORMAL
UROBILINOGEN UR STRIP-MCNC: NORMAL MG/DL (ref 0–2)
WBC # BLD AUTO: 3.9 10E9/L (ref 4–11)
WBC # BLD AUTO: 4.3 10E9/L (ref 4–11)
WBC #/AREA URNS AUTO: 0 /HPF (ref 0–2)

## 2017-03-03 PROCEDURE — 36415 COLL VENOUS BLD VENIPUNCTURE: CPT | Performed by: TRANSPLANT SURGERY

## 2017-03-03 PROCEDURE — 86706 HEP B SURFACE ANTIBODY: CPT | Performed by: TRANSPLANT SURGERY

## 2017-03-03 PROCEDURE — 87086 URINE CULTURE/COLONY COUNT: CPT | Performed by: INTERNAL MEDICINE

## 2017-03-03 PROCEDURE — 81376 HLA II TYPING 1 LOCUS LR: CPT | Performed by: TRANSPLANT SURGERY

## 2017-03-03 PROCEDURE — 86833 HLA CLASS II HIGH DEFIN QUAL: CPT | Performed by: TRANSPLANT SURGERY

## 2017-03-03 PROCEDURE — 25000132 ZZH RX MED GY IP 250 OP 250 PS 637: Performed by: INTERNAL MEDICINE

## 2017-03-03 PROCEDURE — 86832 HLA CLASS I HIGH DEFIN QUAL: CPT | Performed by: TRANSPLANT SURGERY

## 2017-03-03 PROCEDURE — 82150 ASSAY OF AMYLASE: CPT | Performed by: TRANSPLANT SURGERY

## 2017-03-03 PROCEDURE — 84100 ASSAY OF PHOSPHORUS: CPT | Performed by: TRANSPLANT SURGERY

## 2017-03-03 PROCEDURE — 85610 PROTHROMBIN TIME: CPT | Performed by: TRANSPLANT SURGERY

## 2017-03-03 PROCEDURE — 86825 HLA X-MATH NON-CYTOTOXIC: CPT | Performed by: TRANSPLANT SURGERY

## 2017-03-03 PROCEDURE — 40000193 ZZH STATISTIC PT WARD VISIT: Performed by: PHYSICAL THERAPIST

## 2017-03-03 PROCEDURE — 12000006 ZZH R&B IMCU INTERMEDIATE UMMC

## 2017-03-03 PROCEDURE — 87389 HIV-1 AG W/HIV-1&-2 AB AG IA: CPT | Performed by: TRANSPLANT SURGERY

## 2017-03-03 PROCEDURE — 97164 PT RE-EVAL EST PLAN CARE: CPT | Mod: GP | Performed by: PHYSICAL THERAPIST

## 2017-03-03 PROCEDURE — 74000 XR ABDOMEN PORT F1 VW: CPT

## 2017-03-03 PROCEDURE — 86644 CMV ANTIBODY: CPT | Performed by: TRANSPLANT SURGERY

## 2017-03-03 PROCEDURE — 97535 SELF CARE MNGMENT TRAINING: CPT | Mod: GO | Performed by: OCCUPATIONAL THERAPIST

## 2017-03-03 PROCEDURE — 97530 THERAPEUTIC ACTIVITIES: CPT | Mod: GP | Performed by: PHYSICAL THERAPIST

## 2017-03-03 PROCEDURE — 85610 PROTHROMBIN TIME: CPT | Performed by: INTERNAL MEDICINE

## 2017-03-03 PROCEDURE — 81370 HLA I & II TYPING LR: CPT | Performed by: TRANSPLANT SURGERY

## 2017-03-03 PROCEDURE — 80053 COMPREHEN METABOLIC PANEL: CPT | Performed by: INTERNAL MEDICINE

## 2017-03-03 PROCEDURE — 87077 CULTURE AEROBIC IDENTIFY: CPT | Performed by: TRANSPLANT SURGERY

## 2017-03-03 PROCEDURE — 40000065 ZZH STATISTIC EKG NON-CHARGEABLE

## 2017-03-03 PROCEDURE — 40000133 ZZH STATISTIC OT WARD VISIT: Performed by: OCCUPATIONAL THERAPIST

## 2017-03-03 PROCEDURE — 86665 EPSTEIN-BARR CAPSID VCA: CPT | Performed by: TRANSPLANT SURGERY

## 2017-03-03 PROCEDURE — 86803 HEPATITIS C AB TEST: CPT | Performed by: TRANSPLANT SURGERY

## 2017-03-03 PROCEDURE — 71020 XR CHEST 2 VW: CPT

## 2017-03-03 PROCEDURE — 86900 BLOOD TYPING SEROLOGIC ABO: CPT | Performed by: TRANSPLANT SURGERY

## 2017-03-03 PROCEDURE — 36415 COLL VENOUS BLD VENIPUNCTURE: CPT | Performed by: INTERNAL MEDICINE

## 2017-03-03 PROCEDURE — 80053 COMPREHEN METABOLIC PANEL: CPT | Performed by: TRANSPLANT SURGERY

## 2017-03-03 PROCEDURE — 81001 URINALYSIS AUTO W/SCOPE: CPT | Performed by: TRANSPLANT SURGERY

## 2017-03-03 PROCEDURE — 87181 SC STD AGAR DILUTION PER AGT: CPT | Performed by: TRANSPLANT SURGERY

## 2017-03-03 PROCEDURE — 86923 COMPATIBILITY TEST ELECTRIC: CPT | Performed by: TRANSPLANT SURGERY

## 2017-03-03 PROCEDURE — 86645 CMV ANTIBODY IGM: CPT | Performed by: TRANSPLANT SURGERY

## 2017-03-03 PROCEDURE — 85384 FIBRINOGEN ACTIVITY: CPT | Performed by: TRANSPLANT SURGERY

## 2017-03-03 PROCEDURE — 86850 RBC ANTIBODY SCREEN: CPT | Performed by: TRANSPLANT SURGERY

## 2017-03-03 PROCEDURE — 93010 ELECTROCARDIOGRAM REPORT: CPT | Performed by: INTERNAL MEDICINE

## 2017-03-03 PROCEDURE — 86901 BLOOD TYPING SEROLOGIC RH(D): CPT | Performed by: TRANSPLANT SURGERY

## 2017-03-03 PROCEDURE — 87081 CULTURE SCREEN ONLY: CPT | Performed by: TRANSPLANT SURGERY

## 2017-03-03 PROCEDURE — 97116 GAIT TRAINING THERAPY: CPT | Mod: GP | Performed by: PHYSICAL THERAPIST

## 2017-03-03 PROCEDURE — 85025 COMPLETE CBC W/AUTO DIFF WBC: CPT | Performed by: TRANSPLANT SURGERY

## 2017-03-03 PROCEDURE — 86704 HEP B CORE ANTIBODY TOTAL: CPT | Performed by: TRANSPLANT SURGERY

## 2017-03-03 PROCEDURE — 85027 COMPLETE CBC AUTOMATED: CPT | Performed by: INTERNAL MEDICINE

## 2017-03-03 PROCEDURE — 83735 ASSAY OF MAGNESIUM: CPT | Performed by: TRANSPLANT SURGERY

## 2017-03-03 PROCEDURE — 99233 SBSQ HOSP IP/OBS HIGH 50: CPT | Mod: GC | Performed by: INTERNAL MEDICINE

## 2017-03-03 PROCEDURE — 85730 THROMBOPLASTIN TIME PARTIAL: CPT | Performed by: TRANSPLANT SURGERY

## 2017-03-03 RX ORDER — LACTULOSE 10 G/15ML
200 SOLUTION ORAL ONCE
Status: COMPLETED | OUTPATIENT
Start: 2017-03-03 | End: 2017-03-03

## 2017-03-03 RX ORDER — PIPERACILLIN SODIUM, TAZOBACTAM SODIUM 3; .375 G/15ML; G/15ML
3.38 INJECTION, POWDER, LYOPHILIZED, FOR SOLUTION INTRAVENOUS ONCE
Status: COMPLETED | OUTPATIENT
Start: 2017-03-03 | End: 2017-03-04

## 2017-03-03 RX ORDER — VANCOMYCIN HYDROCHLORIDE 1 G/200ML
1000 INJECTION, SOLUTION INTRAVENOUS
Status: COMPLETED | OUTPATIENT
Start: 2017-03-03 | End: 2017-03-04

## 2017-03-03 RX ORDER — LIDOCAINE 40 MG/G
CREAM TOPICAL
Status: DISCONTINUED | OUTPATIENT
Start: 2017-03-03 | End: 2017-03-04 | Stop reason: HOSPADM

## 2017-03-03 RX ORDER — PIPERACILLIN SODIUM, TAZOBACTAM SODIUM 2; .25 G/10ML; G/10ML
2.25 INJECTION, POWDER, LYOPHILIZED, FOR SOLUTION INTRAVENOUS
Status: DISCONTINUED | OUTPATIENT
Start: 2017-03-03 | End: 2017-03-04 | Stop reason: HOSPADM

## 2017-03-03 RX ADMIN — OMEPRAZOLE 20 MG: 20 CAPSULE, DELAYED RELEASE ORAL at 08:56

## 2017-03-03 RX ADMIN — LACTULOSE 200 G: 10 SOLUTION ORAL; RECTAL at 22:07

## 2017-03-03 RX ADMIN — RIFAXIMIN 550 MG: 550 TABLET ORAL at 20:09

## 2017-03-03 RX ADMIN — SODIUM BICARBONATE 650 MG TABLET 1300 MG: at 20:09

## 2017-03-03 RX ADMIN — SODIUM BICARBONATE 650 MG TABLET 1300 MG: at 08:56

## 2017-03-03 RX ADMIN — LACTULOSE 20 G: 20 SOLUTION ORAL at 20:08

## 2017-03-03 RX ADMIN — LACTULOSE 20 G: 20 SOLUTION ORAL at 15:57

## 2017-03-03 RX ADMIN — RIFAXIMIN 550 MG: 550 TABLET ORAL at 08:56

## 2017-03-03 RX ADMIN — LACTULOSE 20 G: 20 SOLUTION ORAL at 08:55

## 2017-03-03 ASSESSMENT — PAIN DESCRIPTION - DESCRIPTORS
DESCRIPTORS: STABBING
DESCRIPTORS: CRAMPING

## 2017-03-03 NOTE — PROGRESS NOTES
03/03/17 1030   Quick Adds   Type of Visit PT Re-evaluation   Living Environment   Lives With child(gisell), adult;spouse   Living Arrangements house   Home Accessibility no concerns   Number of Stairs to Enter Home 0   Number of Stairs Within Home 0   Transportation Available family or friend will provide;car   Living Environment Comment Pt questionable historian, reports he lives with wife and adult daughter in house but has two houses that he alternates between. Pt reports he would go home to smaller more acessible house.   Self-Care   Dominant Hand right   Usual Activity Tolerance good   Current Activity Tolerance moderate   Regular Exercise no   Equipment Currently Used at Home walker, standard   Activity/Exercise/Self-Care Comment Pt reports today he uses a standard walker at baseline   Functional Level Prior   Ambulation 1-->assistive equipment   Transferring 1-->assistive equipment   Toileting 0-->independent   Bathing 0-->independent   Dressing 0-->independent   Eating 0-->independent   Communication 0-->understands/communicates without difficulty   Swallowing 0-->swallows foods/liquids without difficulty   Cognition 0 - no cognition issues reported   Fall history within last six months no   Which of the above functional risks had a recent onset or change? ambulation   Prior Functional Level Comment Pt has had a decline in function from intial evaulation.    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 Now admitted again due to  concern for worsening encephalopathy and infection.    Precautions/Limitations no known precautions/limitations   Heart Disease Risk Factors Medical history;Gender;Diabetes   General Observations Pt slow moving, agreeable, some possible confusion present    General Info Comments Activity orders: up with assist   Cognitive Status Examination   Orientation orientation to person, place and time   Level of Consciousness alert   Follows Commands and Answers Questions 100% of the time   Personal Safety and Judgment intact   Memory impaired   Cognitive Comment Pt with some possible memory difficulties    Pain Assessment   Patient Currently in Pain No   Integumentary/Edema   Integumentary/Edema no deficits were identifed   Posture    Posture Forward head position;Protracted shoulders   Range of Motion (ROM)   ROM Quick Adds No deficits were identified   Strength   Strength Comments generalized weakness per functional mobility    Bed Mobility   Bed Mobility Comments SBA   Transfer Skills   Transfer Comments SBA sit to stand    Gait   Gait Comments CGA with FWW 300ft   Balance   Balance Comments requires use of walker at this time for balance    Sensory Examination   Sensory Perception no deficits were identified   Coordination   Coordination no deficits were identified   Muscle Tone   Muscle Tone no deficits were identified   General Therapy Interventions   Planned Therapy Interventions balance training;strengthening;transfer training;risk factor education;home program guidelines;progressive activity/exercise;gait training;bed mobility training   Clinical Impression   Criteria for Skilled Therapeutic Intervention yes, treatment indicated   PT Diagnosis Impaired activity tolerance   Influenced by the following impairments Impaired balance, safety awareness, activity tolerance    Functional limitations due to impairments impaired functional mobility    Clinical Presentation Evolving/Changing   Clinical Presentation Rationale  "Pt with complex medical history, may be receiving transplant    Clinical Decision Making (Complexity) Moderate complexity   Therapy Frequency` 5 times/week   Predicted Duration of Therapy Intervention (days/wks) 1 week    Anticipated Discharge Disposition Home with Assist;Transitional Care Facility   Risk & Benefits of therapy have been explained Yes   Patient, Family & other staff in agreement with plan of care Yes   Clinical Impression Comments Pt presents with impaired strength and activity to[place], currently may be candidate for transplant, will continue to monitor status.    [facility] AM-PAC  \"6 Clicks\" V.2 Basic Mobility Inpatient Short Form   1. Turning from your back to your side while in a flat bed without using bedrails? 4 - None   2. Moving from lying on your back to sitting on the side of a flat bed without using bedrails? 3 - A Little   3. Moving to and from a bed to a chair (including a wheelchair)? 3 - A Little   4. Standing up from a chair using your arms (e.g., wheelchair, or bedside chair)? 3 - A Little   5. To walk in hospital room? 3 - A Little   6. Climbing 3-5 steps with a railing? 3 - A Little   Basic Mobility Raw Score (Score out of 24.Lower scores equate to lower levels of function) 19   Total Evaluation Time   Total Evaluation Time (Minutes) 5     "

## 2017-03-03 NOTE — PROGRESS NOTES
[facility]   [facility] [place]  Daily Progress Note      Date of Admission: 2/21/2017  Date of Service: 03/03/2017    Camacho Bhagat MRN# [id number]   Age: 51 year old YOB: 1965     Interval History     Mental status is improved this morning.  X-ray continues to show colonic distention, but the patient is having bowel movements.  Mild abdominal pain.  States he does not have much of an appetite.  Denies any fever, chills, nausea, or vomiting.      Medications     Medications and allergies reviewed in [facility].  Changes are reflected in the assessment and plan.      Physical Exam   /48 (BP Location: Right arm)  Pulse 65  Temp 97.9  F (36.6  C) (Oral)  Resp 18  Ht 1.829 m (6')  Wt 98.7 kg (217 lb 9.6 oz)  SpO2 100%  BMI 29.51 kg/m2    Wt Readings from Last 1 Encounters:   03/03/17 98.7 kg (217 lb 9.6 oz)    Body mass index is 29.51 kg/(m^2).     Physical Exam   Alert, awake, and oriented x 3. No acute distress.  Resting in bed comfortably.   HENT: MM slightly dry.    Head: Normocephalic and atraumatic.   Eyes: EOM are normal. Pupils are equal, round, and reactive to light. Scleral icterus is present.   Neck: Neck supple.   Cardiovascular: Normal rate and regular rhythm.   Pulmonary/Chest: Effort normal and breath sounds normal. No respiratory distress. He has no wheezes.   Abdominal: Soft. Bowel sounds are hypoactive. There is no tenderness. There is no rebound and no guarding. Distended,   Musculoskeletal: Normal range of motion. Trace to 1+ lower extremity edema.    Neurological: Alert, awake, and, oriented to person, place and time.  No gross focal findings.    Skin: Skin is warm. No erythema.     Drains and Tubes: None.      Intravascular Access and Device: Peripheral IV x 2     Data     Labs & Studies of Note: Labs reviewed in [facility].      Imaging:   Recent Results (from the past 24 hour(s))   XR Abdomen Port 1 View    Narrative    XR ABDOMEN PORT F1 VW  3/3/2017 8:48 AM      HISTORY: eval  stool burden/ileus    COMPARISON: 3/1/2017    FINDINGS: Cholecystectomy clips. IVC filter. The colon is dilated and  filled with air. Single loop of mildly dilated small bowel visualized  in the lateral left abdomen. No pneumatosis, or portal venous gas.  High density projecting over the mid abdomen likely corresponds to  lipiodol within treated liver lesion.       Impression    IMPRESSION: Severely dilated colon with mildly dilated small bowel  loop.  Findings could represent ileus versus less distal colonic  obstruction.  Not significantly changed from 03/01/2017.    I have personally reviewed the examination and initial interpretation  and I agree with the findings.    DALLIN RENAE MD         Main Plans for Today   - Continue close monitoring for worsening Hepatic encephalopathy.    - Trend MELD labs daily.    - Listed for transplant.    - Insert vazquez catheter for close monitoring of I/Os and intermittent retention.    - Pt currently declined enema/rectal tube - advised if his abdominal pain worsens - we would recommend decompression from below.  Repeat x-ray if worsening pain.      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 Now admitted again due to concern for worsening encephalopathy and infection.       Hepatic Encephalopathy(improved): On admission concern for infection vs. Non compliance with lactulose. Head CT on admission was negative for any acute intracranial process.   - Lactulose 3 times daily - PO. Titrate for 3-5 stools per day.   - Rifaximin 550 mg bid  - PRN Lactulose PO vs enemas per mental status.   - Nursing to continue HE protocol. He continues to have somonlence - nursing continuing to encourage lactulose compliance    Ileus/abdominal pain:   - With increased colonic distention  and concern for ileus - decompress from below if worsening symptoms.  Currently having bowel movements.   - Clinically having bowel movements and benign abdominal exam.    - Continue to monitor.    - Repeat x-ray in the AM.         Cryptogenic cirrhosis/CASTAÑEDA   HCC S/P TACE 09/2016:   Recurrent Ascites/SBP:   - US with dopplers 03/1/17: No evidence of portal vein thrombus.    - D/C Zosyn 02/24 as there is no clear sign of infection. Ciprofloxacin held due to concern for AIN/rising creatinine.    - Diagnostic paracentesis 02/23 - negative for SBP  - Diagnostic/therapeutic 02/28 para negative for SBP - removed 4 liters.   - UA 2/27 negative.   - Hepatology consulted - appreciate recs.   - Listed for transplant.    - MRI abdomen w/wo contrast pending improvement of creatinine to evaluate for recurrence of HCC       Acute Kidney Injury - possible due to AIN vs bilirubin cast nephropathy in setting of diuretics:   Hyponatremia (hypervolemic):   Hyperkalemia(resolved):   : Pt with recurrent issues with hyperkalemia - thought to be related to his ACEI and Aldactone - however he has been discontinued on this medication on recent discharges. K was 6 on admission - now improved.   - Nephrology now consulted for worsening cr from 1.3 to 2.7  - RHC on 2/24 with increased filling pressures.   - Rise in creatinine - DDx includes AIN, vs. Bilirubin cast nephropathy in the setting of diuretics.    - No hydronephrosis on US 3/1  - Bladder scan and monitor post void residuals.   - UA with no clear signs of infection.   - Trend Cr. MRI pending improvement of Creatinine.  - Strict I/Os. Daily weights.   - Completed 3 day albumin challenge on 3/2        Urinary retention  H/O Scrotal edema - Has been recent evaluated by urology. Intermittently has needed a vazquez catheter due to retention on recent admissions. Recurrent issue again requiring vazquez catheter placement on admit. Evaluated for a scrotal mass on last admit - signs of possible  inflammatory changes without a drainable fluid collection.   - Recurrent scrotal irritation - no signs of infection - topical antifungals PRN  - Place vazquez catheter for accurate I/Os and for intermittent urinary retention.        Type II DM:  - Endocrine consulted 2/27- appreciate recs.    - Insulin gtt d/c'd - transitioned to subq Insulin on 3/2  - Hypoglycemia protocol     ------------------------------------------------------------------------------------------------------------------  Prophylaxis:   -GI: PPI  -DVT: None due to bleeding risk.       FEN: Mod carbohydrate diet - Nutrition consulted to help with dietary rec given recurrent hyperkalemia.   Consults: Hepatology, dietician, Cardiology.   Family: Will update as needed.    Code status: Full code.   Disposition: Pending further transplant work up, improvement of kidney function, and further eval of worsening liver disease.   =========================================================  Patient was discussed and evaluated with Dr. Edilberto Martin MD    PGY2  [phone]

## 2017-03-03 NOTE — PLAN OF CARE
Problem: Goal Outcome Summary  Goal: Goal Outcome Summary  OT 6B Pt needing max encouragement from RN and writer to participate. Initially refusing OOB and to drink lactulose.  Agitated and wanting to remain in bed.  Pt did get OOB and take short walk CGA using walker c/o fatigue.  Educated pt on importance of OOB activity pre and post transplant.  Pt refused to stay up in chair after session 2/2 waiting  For visitors and not wanting to use the chair in room.    Rec pending post op progress, likely pt will need TCU

## 2017-03-03 NOTE — PLAN OF CARE
Problem: Goal Outcome Summary  Goal: Goal Outcome Summary  PT 6B: Pt seen for PT re-evaluation as pt has had prolonged hospital stay and a decrease in function. Pt ambulates 300ft with FWW and CGA and requires CGA for transfers. Pt continues to have some possible memory/safety awareness problems but is limited by impaired activity tolerance and generalized weakness. Pt reporting he may soon undergo liver transplant, PT will continue to see pt for aerobic conditioning and gait training during his hospital stay, will alter treatments should pt undergo transplant. Anticipate discharge to TCU vs home with assist pending LOS and progress with therapy.

## 2017-03-03 NOTE — PROGRESS NOTES
CLINICAL NUTRITION SERVICES - BRIEF NOTE    NEW FINDINGS   Received MD Consult: Liver Transplant Pre Op    INTERVENTIONS  Recommendations / Nutrition Prescription  Nutrition already following. Please see very recent RD note dated 3/2/17 for all recs.     If appropriate and liver transplant does occur, would benefit to have a nasal FT placed during surgery to improve post-operative nutrition status.    Toñito Temple RD, LD  6B Clinical Dietitian  Pager: [phone]  ASCOM [phone]

## 2017-03-03 NOTE — PROGRESS NOTES
Nephrology Progress Note  03/03/2017       Interval History :   Continues to be more distended, but not much change from yesterday. Non-oliguric, but UOP relatively low. Cr is stable today. He has no new symptoms. He has a liver offer on the way, may have liver transplant tonight!     Assessment & Recommendations:   51yM with hx of CASTAÑEDA cirrhosis/ESLD with portal HTN requiring twice weekly paracentesis, T2DM on insulin, HCC s/p TACE in 9/2016, GERD on PPI, and normal baseline renal function that is admitted with hepatic encephalopathy, hyperglycemia, and decompensated liver disease now with rising Cr.     1. EJ, baseline Cr 0.7-0.8, admission Cr 1.4, acute rise in Cr to 2.4 on 2/27 -> 2.7 -> 2.8 -> 2.8 -> 2.8. Likely HRS in setting of decompensated liver disease, but also may have bile cast nephropathy. AIN from Cipro less likely but remote possibility and taken off this.   2. Hyperkalemia on admission, resolved, likely 2/2 increased intake and use of spironolactone and decreased renal function  3. Metabolic acidosis, likely 2/2 EJ and lactulose induced stool output  4. Cirrhosis 2/2 CASTAÑEDA, with portal HTN requiring twice weekly paracentesis and grade 1 varices, listed for transplant  5. Hx of HCC s/p TACE 9/2016, repeat MRI with residual tumor but <5cm so still listed for transplant  6. Rising bilirubin, now high 30's, ultrasound without new findings, likely worsening liver disease per hepatology  7. Hepatic encephalopathy, improved with lactulose this admission  8. GERD on PPI  9. Anemia     Recs:  1. Continue to hold diuretics for now  2. If paracentesis, limit to 4L if possible and give albumin  3. If receives liver transplant, will monitor closely for dialysis needs perioperatively. If appears will need dialysis post-operatively based on operative course and UOP/products delivered during OR then please place dialysis catheter in OR     Staffed with Dr. Crowe.      Doug Elaine  Nephrology Fellow  Pager:  [phone]      Review of Systems:   A 4 point review of systems was negative except as noted above.  Notably: poor appetite. no nausea or vomiting or diarrhea.  no confusion no fever or chills    Physical Exam:   /57 (BP Location: Right arm)  Pulse 65  Temp 97.7  F (36.5  C) (Oral)  Resp 16  Ht 1.829 m (6')  Wt 98.7 kg (217 lb 9.6 oz)  SpO2 99%  BMI 29.51 kg/m2     Ins and Outs  Intake/Output Summary (Last 24 hours) at 03/02/17 1639  Last data filed at 03/02/17 1600   Gross per 24 hour   Intake              975 ml   Output              600 ml   Net              375 ml     General: tired appearing  HEENT: +icterus  Heart: RRR, no murmur  Lungs: CTA anterior and posterior  Abd: more distended again today, not tense but quite distended  Ext: trace pitting edema  Access : no HD access    Labs:   All of today's labs reviewed.    Imaging:  Today's imaging reviewed.    Current Medications:  All Reviewed    Doug Elaine  Nephrology Fellow  Pager: [phone]  I, Raffi Crowe, saw this patient on 03/03/2017 with Dr. Elaine and agree with the findings and plan of care as documented in the note.

## 2017-03-03 NOTE — PROGRESS NOTES
03/02/17 2200   Quick Adds   Type of Visit Occupational Therapy Re-evaluation   Living Environment   Lives With child(gisell), adult;spouse   Living Arrangements house   Home Accessibility no concerns   Number of Stairs to Enter Home 0   Number of Stairs Within Home 0   Living Environment Comment Please see initial eval. Pt reports IND living with wife and adult daughter. Pt was possibily poor historian at that date adn is more confused at reeval.    Self-Care   Dominant Hand right   Usual Activity Tolerance good   Current Activity Tolerance poor   Regular Exercise yes   Activity/Exercise Type walking   Exercise Amount/Frequency daily   Activity/Exercise/Self-Care Comment Dog walking   Functional Level Prior   Ambulation 0-->independent   Transferring 0-->independent   Toileting 0-->independent   Bathing 0-->independent   Dressing 0-->independent   Eating 0-->independent   Cognition 0 - no cognition issues reported   Which of the above functional risks had a recent onset or change? ambulation;transferring;toileting;bathing;dressing;cognition   Prior Functional Level Comment Pt has had a decline in function from intial evaulation.    General Information   Onset of Illness/Injury or Date of Surgery - Date 02/21/17   Referring Physician Dr Coleman   Patient/Family Goals Statement to go home and see his dog   Additional Occupational Profile Info/Pertinent History of Current Problem 51yM with hx of CASTAÑEDA cirrhosis/ESLD with portal HTN requiring twice weekly paracentesis, T2DM on insulin, HCC s/p TACE in 9/2016, GERD on PPI, and normal baseline renal function that is admitted with hepatic encephalopathy, hyperglycemia, and decompensated liver disease now with rising Cr.   Precautions/Limitations fall precautions   General Observations Pt varing from upset to angry. Agreeable to therapy with encouragement.    General Info Comments up w A   Cognitive Status Examination   Orientation person;place   Level of Consciousness  lethargic/somnolent;agitated;confused   Able to Follow Commands moderate impairment   Personal Safety (Cognitive) moderate impairment   Memory impaired   Cognitive Comment Pt mom present, making occasional odd statements indicating confusion.    Visual Perception   Visual Perception No deficits were identified   Integumentary/Edema   Integumentary/Edema Comments LE and abdominal swelling   Posture   Posture forward head position   Range of Motion (ROM)   ROM Comment MAYELA   Strength   Strength Comments below baseline   Coordination   Fine Motor Coordination below baseline   Mobility   Bed Mobility Comments Mod A x2   Transfer Skills   Transfer Comments Mod A x2   Transfer Skill: Bed to Chair/Chair to Bed   Level of [place]: Bed to Chair moderate assist (50% patients effort)   Physical Assist/Nonphysical Assist: Bed to Chair 2 persons   Transfer Skill: Sit to Stand   Level of [place]: Sit/Stand moderate assist (50% patients effort)   Physical Assist/Nonphysical Assist: Sit/Stand 2 persons   Toilet Transfer   Toilet Transfer Comments BSC mod A x2   Balance   Balance Comments poor balance due to weakness   Upper Body Dressing   Level of [place]: Dress Upper Body maximum assist (25% patients effort)   Lower Body Dressing   Level of [place]: Dress Lower Body maximum assist (25% patients effort)   Toileting   Level of [place]: Toilet maximum assist (25% patients effort)   Grooming   Level of [place]: Grooming minimum assist (75% patients effort)   Physical Assist/Nonphysical Assist: Grooming verbal cues   Eating/Self Feeding   Level of [place]: Eating stand-by assist   Physical Assist/Nonphysical Assist: Eating set-up required   Instrumental Activities of Daily Living (IADL)   Previous Responsibilities meal prep;housekeeping;laundry;shopping;medication management;finances;driving   Activities of Daily Living Analysis   Impairments Contributing to Impaired Activities of Daily Living balance  "impaired;cognition impaired;coordination impaired;pain;strength decreased   General Therapy Interventions   Planned Therapy Interventions ADL retraining;IADL retraining;home program guidelines;strengthening   Clinical Impression   Criteria for Skilled Therapeutic Interventions Met yes, treatment indicated   OT Diagnosis deconditioning, cognition, coordination, safety   Influenced by the following impairments decreased strength, cognition   Assessment of Occupational Performance 1-3 Performance Deficits   Identified Performance Deficits transfers, dressing   Clinical Decision Making (Complexity) Low complexity   Therapy Frequency daily   Predicted Duration of Therapy Intervention (days/wks) 2 weeks   Anticipated Discharge Disposition Transitional Care Facility   Risks and Benefits of Treatment have been explained. Yes   Patient, Family & other staff in agreement with plan of care Yes   [facility] AM-PAC  \"6 Clicks\" Daily Activity Inpatient Short Form   1. Putting on and taking off regular lower body clothing? 2 - A Lot   2. Bathing (including washing, rinsing, drying)? 2 - A Lot   3. Toileting, which includes using toilet, bedpan or urinal? 2 - A Lot   4. Putting on and taking off regular upper body clothing? 2 - A Lot   5. Taking care of personal grooming such as brushing teeth? 3 - A Little   6. Eating meals? 4 - None   Daily Activity Raw Score (Score out of 24.Lower scores equate to lower levels of function) 15   Total Evaluation Time   Total Evaluation Time (Minutes) 5     "

## 2017-03-03 NOTE — PROGRESS NOTES
[facility]  HEPATOLOGY PROGRESS NOTE  Camacho Bhagat [id number]     SUBJECTIVE:  Up walking with PT. Denies abdominal pain, melena or hematochezia.     OBJECTIVE:  VS: /57 (BP Location: Right arm)  Pulse 65  Temp 97.7  F (36.5  C) (Oral)  Resp 16  Ht 1.829 m (6')  Wt 98.7 kg (217 lb 9.6 oz)  SpO2 99%  BMI 29.51 kg/m2   General: In no acute distress, mild facial muscle wasting  Neuro: Responds to questions, denies  No asterixis  HEENT:  Moderate scleral icterus  CV:  Skin warm and dry  Lungs:  Respirations even and nonlabored on room air  Abd: Nontender, moderately distended   Extrem: Trace lower peripehral edema  Skin: Moderate jaundice    REVIEW OF LABORATORY, PATHOLOGY AND IMAGING RESULTS:  BMP    Recent Labs  Lab 03/03/17 1458 03/03/17 0451 03/02/17 0511 03/01/17  0713   * 129* 133 126*   POTASSIUM 3.5 3.7 3.5 3.9   CHLORIDE 98 100 101 97   JACOB 8.5 8.7 9.1 8.9   CO2 17* 16* 16* 15*   BUN 99* 107* 93* 94*   CR 2.78* 2.75* 2.88* 2.85*   * 220* 234* 143*     CBC    Recent Labs  Lab 03/03/17 1458 03/03/17 0451 03/02/17  0511 03/01/17  0713   WBC 3.9* 4.3 4.0 6.1   RBC 2.72* 2.78* 2.85* 2.90*   HGB 9.2* 9.3* 9.6* 9.8*   HCT 26.5* 26.8* 27.6* 28.1*   MCV 97 96 97 97   MCH 33.8* 33.5* 33.7* 33.8*   MCHC 34.7 34.7 34.8 34.9   RDW 15.4* 15.3* 15.5* 15.5*   PLT 34* 38* 34* 45*     INR    Recent Labs  Lab 03/03/17 1458 03/03/17 0451 03/02/17  0511 03/01/17  0713   INR 1.82* 1.73* 1.72* 1.80*     LFTs    Recent Labs  Lab 03/03/17  1458 03/03/17  0451 03/02/17  0511 03/01/17  0713   ALKPHOS 200* 210* 194* 208*   * 122* 128* 141*   ALT 72* 80* 83* 83*   BILITOTAL 35.2* 37.7* 40.4* 38.8*   PROTTOTAL 4.7* 4.9* 5.0* 4.6*   ALBUMIN 3.0* 3.0* 3.6 3.0*      PANC    Recent Labs  Lab 03/03/17  1458   AMYLASE 70      MELD-Na score: 37 at 3/3/2017  2:58 PM  MELD score: 36 at 3/3/2017  2:58 PM  Calculated from:  Serum Creatinine: 2.78 mg/dL at 3/3/2017  2:58 PM  Serum Sodium: 131 mmol/L at 3/3/2017   2:58 PM  Total Bilirubin: 35.2 mg/dL at 3/3/2017  2:58 PM  INR(ratio): 1.82 at 3/3/2017  2:58 PM  Age: 51 years      Imaging Results:  MRI w/wo 1/6/17  IMPRESSION:  1. Cirrhotic appearance of the liver with evidence of portal  hypertension including splenomegaly and small-to-moderate ascites.  2. Hepatic segment 3 previously treated lesion. The overall treatment  zone size is decreased, though the degree of enhancing soft tissue  within the treatment zone is not substantially changed. This low-level  enhancing tissue continues to remain concerning for malignancy and  attention on short-term follow-up is needed. OPTN 5T.  3. Based on this exam alone, the patient is within [place] criteria.    US 3/1/17  Impression:   1. Cirrhosis with redemonstration of previously treated segment 3  hepatocellular carcinoma. Evaluation for residual disease is limited  on the current exam.  2. Sequela of portal hypertension including splenomegaly and  small-volume ascites.  3. No evidence of portal venous thrombosis, however new retrograde  flow throughout the portal venous system compared to prior ultrasound  of 10/27/2016.  4. No hydronephrosis as questioned.    IMPRESSION:  Camacho Bhagat is a 51 year old male with a history of CASTAÑEDA cirrhosis, HCC s/p TACE/RFA, SBP, ascites, HE, HLD, HTN, and DM II who was admitted with evidence of HE. He is listed for liver transplant but currently on hold due to need for cardiology clearance. He has had mild improvement in his mentation and has been started on empiric treatment of SBP. His sodium level has improved with hydration.     RECOMMENDATIONS:   1. Hepatic encephalopathy  - continue lactulose, titrating for 3-5 stools per day  - rifaximin 550 mg BID  - infectious work up so far negative     2. Ascites  3. SBP hx  - para 2/27 without SBP  Prior sample negative for SBP  - if negative, will need SBP ppx with ciprofloxacin- 250 mg qod  - due to kidney function, limit para volume to 4 liters.   -  May need more frequent small volume ricardo for comfort.      4. CASTAÑEDA cirrhosis  5. HCC  6. Transplant candidacy  - listed for transplant with MELD of 38 and now active.  RHC 2/24 with fluid volume overload. Cardiology cleared  - kidney function limiting being able to perform MRI- worsening bili can be related to liver failure.   - Continue with daily MELD labs.    - No evidence of PVT on US     8. Malnutrition  - monitor calorie count, may need NG tube if not taking in calories.     9. Debility  - PT/OT following  - encouraged patient to continue with mobilizing.   - has some evidence of ileus, stressed importance of walking.        Plan of care discussed with Dr. Santana.    Thank you for the opportunity to be involved with  Camacho MOORE [facility]. Please call with any questions or concerns.    Abigail Robison, LAMIN, CNP  [phone]

## 2017-03-03 NOTE — PLAN OF CARE
Problem: Goal Outcome Summary  Goal: Goal Outcome Summary  Outcome: Improving  Temp: 97.9  F (36.6  C) Temp src: Oral BP: 127/65   Heart Rate: 65 Resp: 18 SpO2: 99 % O2 Device: None (Room air)    Pt A&Ox4, agitated at times. PRN [place] Lactulose. Afebrile. VSS. On room air. NSR, rates 60s. No c/o pain. Lung sounds clear/diminished in bases. Up with assist x1 to commode. Large BM x2. Adequate UOP. Carb controlled diet. Skin intact/jaundiced. BLE edema. Abdomen firm/distened. Blood sugars 200s. Plan to have possible paracentesis today. Continue to monitor and notify MD of any changes.

## 2017-03-03 NOTE — PROGRESS NOTES
Diabetes Consult Daily  Progress Note          Assessment/Plan:   Mr. Bhagat is a 51 year old with uncontrolled type 2 diabetes and with end stage liver disease and listed for transplant, admitted to [facility] 2/21/2017 with worsening encephalopathy and concern for infection. He was experiencing persistent hyperglycemia in the setting of likely omitted aspart, building glucose toxicity, and potentially infection (SBP negative) .     Glucose trending higher overnight into 200's.  BG of 412 @ 1600 was due to intake of ensure with no CHO coverage @1400.       Plan  increase glargine 5 units to 7 units qAM, increase glargine 15 units to 18 units  qPM  Aspart 2 units/carb unit to be given if diet is resumed  Aspart medium resistance correction q4h--->  1 per 30  Mg/dL glucose   BG monitor q4h    If start supplemental nutrition will need revised insulin plan and potentially insulin infusion again.      We will follow.  If patient is amenable, will refer for outpatient diabetes follow up at [facility] (this is the desire of his wife and mother).              Interval History:   Pt  found to have ileus, but has started clear liquids RD left recs for nutrition supp options   Camacho complained fatigue and thirst. And lack of appetite  Creatinine continues ~2.8    Diet now resumed.    Recent Labs  Lab 03/03/17  1559 03/03/17  1458 03/03/17  1221 03/03/17  0907 03/03/17  0451 03/03/17  0415 03/02/17  2333 03/02/17  2105  03/02/17  0511  03/01/17  0713  02/28/17 2015 02/28/17  0916   GLC  --  371*  --   --  220*  --   --   --   --  234*  --  143*  --  136*  --  129*   *  --  293* 198*  --  203* 227* 292*  < >  --   < >  --   < >  --   < > 115*   < > = values in this interval not displayed.            Review of Systems:   See interval hx          Medications:   Lactulose scheduled and PRN-- past exploration into quantity of sugar was unrevealing (proprietary recipe)    Active Diet Order      NPO per  Anesthesia Guidelines      NPO Time Specified Meds     Physical Exam:  Gen:  resting in bed, in NAD, jaundice   HEENT: NC/AT, icteric sclerae, mucous membranes are sl dry  Resp: Unlabored  Neuro: arouse to verbal briefly  /57 (BP Location: Right arm)  Pulse 65  Temp 97.7  F (36.5  C) (Oral)  Resp 16  Ht 1.829 m (6')  Wt 98.7 kg (217 lb 9.6 oz)  SpO2 99%  BMI 29.51 kg/m2           Data:     Lab Results   Component Value Date    A1C 9.4 02/16/2017    A1C 6.2 12/14/2016    A1C 6.1 10/27/2016    A1C 8.3 07/02/2012    A1C 8.5 07/31/2009              Recent Labs   Lab Test  03/02/17   0511  03/01/17   0713   NA  133  126*   POTASSIUM  3.5  3.9   CHLORIDE  101  97   CO2  16*  15*   ANIONGAP  16*  14   GLC  234*  143*   BUN  93*  94*   CR  2.88*  2.85*   JACOB  9.1  8.9     CBC RESULTS:   Recent Labs   Lab Test  03/02/17   0511   WBC  4.0   RBC  2.85*   HGB  9.6*   HCT  27.6*   MCV  97   MCH  33.7*   MCHC  34.8   RDW  15.5*   PLT  34*       Roselia Bryson APRN [id number]    Diabetes Management job code [id number]

## 2017-03-03 NOTE — PROGRESS NOTES
I met with the patient and family and explained the risk benefits and alternatives of liver transplantation    I had a long discussion with the patient regarding liver transplantation which included but was not limited to  the following points:    1. Donor details as listed in donor net  2.   Risk of donor transmitted infections and donor transmitted malignancy  3. The liver transplant operation and the associated risks and technical complications which can include intraoperative death, post operative death,  Primary non-function, bleeding requiring re-operations, arterial and biliary complications, bowel perforations, and intra abdominal abscess. Some of these complicaitons may require a second operation.  4. The postoperative course, the ICU stay and risk of postoperative complications which can include sepsis, MI, stroke, brain injury, pneumonia, pleural effusions, and renal dysfunction.  5. The current 1 year and 5 year graft and patient survivals.  6. The need for life long immunosuppressive therapy and the side effects of these medications, including the possibility of toxicity, opportunistic infections, risk of cancer including lymphoma, and the possibility of rejection even if the patient is taking the medication exactly as prescribed.  7. The need for compliance with medications and follow-up visits in the clinic and thereafter.  8. The patient and family understand these risks and wish to proceed to transplantation    Present were patient, his wife and daughter

## 2017-03-04 ENCOUNTER — APPOINTMENT (OUTPATIENT)
Dept: GENERAL RADIOLOGY | Facility: CLINIC | Age: 52
DRG: 005 | End: 2017-03-04
Attending: TRANSPLANT SURGERY
Payer: COMMERCIAL

## 2017-03-04 ENCOUNTER — ANESTHESIA (OUTPATIENT)
Dept: SURGERY | Facility: CLINIC | Age: 52
DRG: 005 | End: 2017-03-04
Payer: COMMERCIAL

## 2017-03-04 ENCOUNTER — DOCUMENTATION ONLY (OUTPATIENT)
Dept: TRANSPLANT | Facility: CLINIC | Age: 52
End: 2017-03-04

## 2017-03-04 PROBLEM — Z94.4 LIVER TRANSPLANT RECIPIENT (H): Status: ACTIVE | Noted: 2017-03-04

## 2017-03-04 LAB
ABO + RH BLD: NORMAL
ABO + RH BLD: NORMAL
ALBUMIN SERPL-MCNC: 2.6 G/DL (ref 3.4–5)
ALBUMIN SERPL-MCNC: 3.1 G/DL (ref 3.4–5)
ALP SERPL-CCNC: 114 U/L (ref 40–150)
ALP SERPL-CCNC: 222 U/L (ref 40–150)
ALT SERPL W P-5'-P-CCNC: 497 U/L (ref 0–70)
ALT SERPL W P-5'-P-CCNC: 90 U/L (ref 0–70)
ANION GAP SERPL CALCULATED.3IONS-SCNC: 14 MMOL/L (ref 3–14)
ANION GAP SERPL CALCULATED.3IONS-SCNC: 14 MMOL/L (ref 3–14)
APTT PPP: 42 SEC (ref 22–37)
APTT PPP: 47 SEC (ref 22–37)
APTT PPP: 59 SEC (ref 22–37)
AST SERPL W P-5'-P-CCNC: 1010 U/L (ref 0–45)
AST SERPL W P-5'-P-CCNC: 130 U/L (ref 0–45)
BACTERIA SPEC CULT: NO GROWTH
BACTERIA SPEC CULT: NO GROWTH
BASE DEFICIT BLDA-SCNC: 3.2 MMOL/L
BASE DEFICIT BLDA-SCNC: 3.8 MMOL/L
BASE DEFICIT BLDA-SCNC: 4.4 MMOL/L
BASE DEFICIT BLDA-SCNC: 7.3 MMOL/L
BASE DEFICIT BLDV-SCNC: 3.7 MMOL/L
BASOPHILS # BLD AUTO: 0 10E9/L (ref 0–0.2)
BASOPHILS NFR BLD AUTO: 0.1 %
BILIRUB DIRECT SERPL-MCNC: 14.9 MG/DL (ref 0–0.2)
BILIRUB SERPL-MCNC: 19.9 MG/DL (ref 0.2–1.3)
BILIRUB SERPL-MCNC: 37.2 MG/DL (ref 0.2–1.3)
BLD GP AB SCN SERPL QL: NORMAL
BLD PROD TYP BPU: NORMAL
BLD UNIT ID BPU: 0
BLOOD BANK CMNT PATIENT-IMP: NORMAL
BLOOD PRODUCT CODE: NORMAL
BPU ID: NORMAL
BUN SERPL-MCNC: 108 MG/DL (ref 7–30)
BUN SERPL-MCNC: 88 MG/DL (ref 7–30)
CA-I BLD-MCNC: 3.7 MG/DL (ref 4.4–5.2)
CA-I BLD-MCNC: 4.8 MG/DL (ref 4.4–5.2)
CA-I BLD-MCNC: 4.9 MG/DL (ref 4.4–5.2)
CALCIUM SERPL-MCNC: 7.8 MG/DL (ref 8.5–10.1)
CALCIUM SERPL-MCNC: 9.1 MG/DL (ref 8.5–10.1)
CHLORIDE SERPL-SCNC: 106 MMOL/L (ref 94–109)
CHLORIDE SERPL-SCNC: 99 MMOL/L (ref 94–109)
CO2 SERPL-SCNC: 17 MMOL/L (ref 20–32)
CO2 SERPL-SCNC: 22 MMOL/L (ref 20–32)
CREAT SERPL-MCNC: 2.55 MG/DL (ref 0.66–1.25)
CREAT SERPL-MCNC: 2.82 MG/DL (ref 0.66–1.25)
DIFFERENTIAL METHOD BLD: ABNORMAL
EOSINOPHIL # BLD AUTO: 0 10E9/L (ref 0–0.7)
EOSINOPHIL NFR BLD AUTO: 0.2 %
ERYTHROCYTE [DISTWIDTH] IN BLOOD BY AUTOMATED COUNT: 15.4 % (ref 10–15)
ERYTHROCYTE [DISTWIDTH] IN BLOOD BY AUTOMATED COUNT: 17 % (ref 10–15)
FIBRINOGEN PPP-MCNC: 155 MG/DL (ref 200–420)
FIBRINOGEN PPP-MCNC: 189 MG/DL (ref 200–420)
FIBRINOGEN PPP-MCNC: 230 MG/DL (ref 200–420)
GFR SERPL CREATININE-BSD FRML MDRD: 24 ML/MIN/1.7M2
GFR SERPL CREATININE-BSD FRML MDRD: 27 ML/MIN/1.7M2
GLUCOSE BLD-MCNC: 165 MG/DL (ref 70–99)
GLUCOSE BLD-MCNC: 218 MG/DL (ref 70–99)
GLUCOSE BLD-MCNC: 221 MG/DL (ref 70–99)
GLUCOSE SERPL-MCNC: 205 MG/DL (ref 70–99)
GLUCOSE SERPL-MCNC: 226 MG/DL (ref 70–99)
GRAM STN SPEC: NORMAL
HCO3 BLD-SCNC: 18 MMOL/L (ref 21–28)
HCO3 BLD-SCNC: 21 MMOL/L (ref 21–28)
HCO3 BLD-SCNC: 22 MMOL/L (ref 21–28)
HCO3 BLD-SCNC: 22 MMOL/L (ref 21–28)
HCO3 BLDV-SCNC: 23 MMOL/L (ref 21–28)
HCT VFR BLD AUTO: 26.9 % (ref 40–53)
HCT VFR BLD AUTO: 28.8 % (ref 40–53)
HGB BLD-MCNC: 10 G/DL (ref 13.3–17.7)
HGB BLD-MCNC: 10.4 G/DL (ref 13.3–17.7)
HGB BLD-MCNC: 7.9 G/DL (ref 13.3–17.7)
HGB BLD-MCNC: 9.5 G/DL (ref 13.3–17.7)
IMM GRANULOCYTES # BLD: 0.1 10E9/L (ref 0–0.4)
IMM GRANULOCYTES NFR BLD: 0.5 %
INR PPP: 1.67 (ref 0.86–1.14)
INR PPP: 1.81 (ref 0.86–1.14)
INR PPP: 1.84 (ref 0.86–1.14)
INR PPP: 1.94 (ref 0.86–1.14)
INR PPP: 2.19 (ref 0.86–1.14)
LACTATE BLD-SCNC: 1.5 MMOL/L (ref 0.7–2.1)
LACTATE BLD-SCNC: 1.5 MMOL/L (ref 0.7–2.1)
LACTATE BLD-SCNC: 1.8 MMOL/L (ref 0.7–2.1)
LACTATE BLD-SCNC: 2.1 MMOL/L (ref 0.7–2.1)
LYMPHOCYTES # BLD AUTO: 0.5 10E9/L (ref 0.8–5.3)
LYMPHOCYTES NFR BLD AUTO: 3.9 %
Lab: NORMAL
MCH RBC QN AUTO: 32.7 PG (ref 26.5–33)
MCH RBC QN AUTO: 33.9 PG (ref 26.5–33)
MCHC RBC AUTO-ENTMCNC: 35.3 G/DL (ref 31.5–36.5)
MCHC RBC AUTO-ENTMCNC: 36.1 G/DL (ref 31.5–36.5)
MCV RBC AUTO: 91 FL (ref 78–100)
MCV RBC AUTO: 96 FL (ref 78–100)
MICRO REPORT STATUS: NORMAL
MONOCYTES # BLD AUTO: 0.2 10E9/L (ref 0–1.3)
MONOCYTES NFR BLD AUTO: 1.2 %
MRSA DNA SPEC QL NAA+PROBE: NORMAL
NEUTROPHILS # BLD AUTO: 11.8 10E9/L (ref 1.6–8.3)
NEUTROPHILS NFR BLD AUTO: 94.1 %
NRBC # BLD AUTO: 0 10*3/UL
NRBC BLD AUTO-RTO: 0 /100
NUM BPU REQUESTED: 10
NUM BPU REQUESTED: 12
NUM BPU REQUESTED: 6
NUM BPU REQUESTED: 8
O2/TOTAL GAS SETTING VFR VENT: 60 %
O2/TOTAL GAS SETTING VFR VENT: 60 %
O2/TOTAL GAS SETTING VFR VENT: 96 %
O2/TOTAL GAS SETTING VFR VENT: 96 %
O2/TOTAL GAS SETTING VFR VENT: 97 %
OXYHGB MFR BLD: 97 % (ref 92–100)
OXYHGB MFR BLD: 99 % (ref 92–100)
OXYHGB MFR BLDV: 85 %
PCO2 BLD: 33 MM HG (ref 35–45)
PCO2 BLD: 38 MM HG (ref 35–45)
PCO2 BLD: 40 MM HG (ref 35–45)
PCO2 BLD: 43 MM HG (ref 35–45)
PCO2 BLDV: 47 MM HG (ref 40–50)
PH BLD: 7.32 PH (ref 7.35–7.45)
PH BLD: 7.34 PH (ref 7.35–7.45)
PH BLD: 7.35 PH (ref 7.35–7.45)
PH BLD: 7.37 PH (ref 7.35–7.45)
PH BLDV: 7.3 PH (ref 7.32–7.43)
PLATELET # BLD AUTO: 100 10E9/L (ref 150–450)
PLATELET # BLD AUTO: 113 10E9/L (ref 150–450)
PLATELET # BLD AUTO: 37 10E9/L (ref 150–450)
PLATELET # BLD AUTO: 58 10E9/L (ref 150–450)
PO2 BLD: 175 MM HG (ref 80–105)
PO2 BLD: 315 MM HG (ref 80–105)
PO2 BLD: 353 MM HG (ref 80–105)
PO2 BLD: 376 MM HG (ref 80–105)
PO2 BLDV: 57 MM HG (ref 25–47)
POTASSIUM BLD-SCNC: 3.4 MMOL/L (ref 3.4–5.3)
POTASSIUM BLD-SCNC: 3.6 MMOL/L (ref 3.4–5.3)
POTASSIUM BLD-SCNC: 3.7 MMOL/L (ref 3.4–5.3)
POTASSIUM SERPL-SCNC: 3.8 MMOL/L (ref 3.4–5.3)
POTASSIUM SERPL-SCNC: 3.9 MMOL/L (ref 3.4–5.3)
PROT SERPL-MCNC: 4.3 G/DL (ref 6.8–8.8)
PROT SERPL-MCNC: 4.8 G/DL (ref 6.8–8.8)
RBC # BLD AUTO: 2.8 10E12/L (ref 4.4–5.9)
RBC # BLD AUTO: 3.18 10E12/L (ref 4.4–5.9)
SODIUM BLD-SCNC: 134 MMOL/L (ref 133–144)
SODIUM BLD-SCNC: 136 MMOL/L (ref 133–144)
SODIUM BLD-SCNC: 137 MMOL/L (ref 133–144)
SODIUM SERPL-SCNC: 130 MMOL/L (ref 133–144)
SODIUM SERPL-SCNC: 142 MMOL/L (ref 133–144)
SPECIMEN EXP DATE BLD: NORMAL
SPECIMEN SOURCE: NORMAL
TRANSFUSION STATUS PATIENT QL: NORMAL
WBC # BLD AUTO: 12.5 10E9/L (ref 4–11)
WBC # BLD AUTO: 4.7 10E9/L (ref 4–11)

## 2017-03-04 PROCEDURE — 84295 ASSAY OF SERUM SODIUM: CPT | Performed by: INTERNAL MEDICINE

## 2017-03-04 PROCEDURE — 36000070 ZZH SURGERY LEVEL 5 EA 15 ADDTL MIN - UMMC: Performed by: TRANSPLANT SURGERY

## 2017-03-04 PROCEDURE — 25000125 ZZHC RX 250: Performed by: NURSE ANESTHETIST, CERTIFIED REGISTERED

## 2017-03-04 PROCEDURE — 41000019 ZZH PERA-PERFUSION EACH ADDTL 15 MIN: Performed by: TRANSPLANT SURGERY

## 2017-03-04 PROCEDURE — 84295 ASSAY OF SERUM SODIUM: CPT

## 2017-03-04 PROCEDURE — 25000125 ZZHC RX 250: Performed by: STUDENT IN AN ORGANIZED HEALTH CARE EDUCATION/TRAINING PROGRAM

## 2017-03-04 PROCEDURE — P9041 ALBUMIN (HUMAN),5%, 50ML: HCPCS | Performed by: NURSE ANESTHETIST, CERTIFIED REGISTERED

## 2017-03-04 PROCEDURE — 83605 ASSAY OF LACTIC ACID: CPT | Performed by: INTERNAL MEDICINE

## 2017-03-04 PROCEDURE — 25800025 ZZH RX 258: Performed by: TRANSPLANT SURGERY

## 2017-03-04 PROCEDURE — 25000132 ZZH RX MED GY IP 250 OP 250 PS 637: Performed by: NURSE ANESTHETIST, CERTIFIED REGISTERED

## 2017-03-04 PROCEDURE — 87075 CULTR BACTERIA EXCEPT BLOOD: CPT | Performed by: TRANSPLANT SURGERY

## 2017-03-04 PROCEDURE — C2617 STENT, NON-COR, TEM W/O DEL: HCPCS | Performed by: TRANSPLANT SURGERY

## 2017-03-04 PROCEDURE — 88309 TISSUE EXAM BY PATHOLOGIST: CPT | Performed by: TRANSPLANT SURGERY

## 2017-03-04 PROCEDURE — 27210460 ZZH PUMP APP ADULT PERFUSION: Performed by: TRANSPLANT SURGERY

## 2017-03-04 PROCEDURE — 87205 SMEAR GRAM STAIN: CPT | Performed by: TRANSPLANT SURGERY

## 2017-03-04 PROCEDURE — 83605 ASSAY OF LACTIC ACID: CPT

## 2017-03-04 PROCEDURE — 25000128 H RX IP 250 OP 636: Performed by: TRANSPLANT SURGERY

## 2017-03-04 PROCEDURE — 87641 MR-STAPH DNA AMP PROBE: CPT | Performed by: TRANSPLANT SURGERY

## 2017-03-04 PROCEDURE — 82947 ASSAY GLUCOSE BLOOD QUANT: CPT | Performed by: INTERNAL MEDICINE

## 2017-03-04 PROCEDURE — 36000068 ZZH SURGERY LEVEL 5 1ST 30 MIN - UMMC: Performed by: TRANSPLANT SURGERY

## 2017-03-04 PROCEDURE — 20000004 ZZH R&B ICU UMMC

## 2017-03-04 PROCEDURE — 85610 PROTHROMBIN TIME: CPT | Performed by: INTERNAL MEDICINE

## 2017-03-04 PROCEDURE — 25000131 ZZH RX MED GY IP 250 OP 636 PS 637: Performed by: TRANSPLANT SURGERY

## 2017-03-04 PROCEDURE — 82803 BLOOD GASES ANY COMBINATION: CPT | Performed by: INTERNAL MEDICINE

## 2017-03-04 PROCEDURE — S5010 5% DEXTROSE AND 0.45% SALINE: HCPCS | Performed by: TRANSPLANT SURGERY

## 2017-03-04 PROCEDURE — 83605 ASSAY OF LACTIC ACID: CPT | Performed by: TRANSPLANT SURGERY

## 2017-03-04 PROCEDURE — 85049 AUTOMATED PLATELET COUNT: CPT

## 2017-03-04 PROCEDURE — 25000128 H RX IP 250 OP 636: Performed by: NURSE ANESTHETIST, CERTIFIED REGISTERED

## 2017-03-04 PROCEDURE — 88341 IMHCHEM/IMCYTCHM EA ADD ANTB: CPT | Performed by: TRANSPLANT SURGERY

## 2017-03-04 PROCEDURE — 80053 COMPREHEN METABOLIC PANEL: CPT | Performed by: INTERNAL MEDICINE

## 2017-03-04 PROCEDURE — 71010 XR CHEST PORT 1 VW: CPT

## 2017-03-04 PROCEDURE — 85384 FIBRINOGEN ACTIVITY: CPT

## 2017-03-04 PROCEDURE — 25000125 ZZHC RX 250: Performed by: TRANSPLANT SURGERY

## 2017-03-04 PROCEDURE — 82805 BLOOD GASES W/O2 SATURATION: CPT | Performed by: INTERNAL MEDICINE

## 2017-03-04 PROCEDURE — 25000128 H RX IP 250 OP 636

## 2017-03-04 PROCEDURE — 88313 SPECIAL STAINS GROUP 2: CPT | Performed by: TRANSPLANT SURGERY

## 2017-03-04 PROCEDURE — 84132 ASSAY OF SERUM POTASSIUM: CPT

## 2017-03-04 PROCEDURE — P9037 PLATE PHERES LEUKOREDU IRRAD: HCPCS | Performed by: INTERNAL MEDICINE

## 2017-03-04 PROCEDURE — 82330 ASSAY OF CALCIUM: CPT | Performed by: TRANSPLANT SURGERY

## 2017-03-04 PROCEDURE — 25000565 ZZH ISOFLURANE, EA 15 MIN: Performed by: TRANSPLANT SURGERY

## 2017-03-04 PROCEDURE — 85610 PROTHROMBIN TIME: CPT | Performed by: TRANSPLANT SURGERY

## 2017-03-04 PROCEDURE — 82947 ASSAY GLUCOSE BLOOD QUANT: CPT

## 2017-03-04 PROCEDURE — 85610 PROTHROMBIN TIME: CPT

## 2017-03-04 PROCEDURE — 85730 THROMBOPLASTIN TIME PARTIAL: CPT | Performed by: INTERNAL MEDICINE

## 2017-03-04 PROCEDURE — P9041 ALBUMIN (HUMAN),5%, 50ML: HCPCS | Performed by: STUDENT IN AN ORGANIZED HEALTH CARE EDUCATION/TRAINING PROGRAM

## 2017-03-04 PROCEDURE — 27210794 ZZH OR GENERAL SUPPLY STERILE: Performed by: TRANSPLANT SURGERY

## 2017-03-04 PROCEDURE — P9016 RBC LEUKOCYTES REDUCED: HCPCS | Performed by: TRANSPLANT SURGERY

## 2017-03-04 PROCEDURE — 25000132 ZZH RX MED GY IP 250 OP 250 PS 637: Performed by: TRANSPLANT SURGERY

## 2017-03-04 PROCEDURE — 27210995 ZZH RX 272: Performed by: TRANSPLANT SURGERY

## 2017-03-04 PROCEDURE — 88304 TISSUE EXAM BY PATHOLOGIST: CPT | Performed by: TRANSPLANT SURGERY

## 2017-03-04 PROCEDURE — 85025 COMPLETE CBC W/AUTO DIFF WBC: CPT | Performed by: TRANSPLANT SURGERY

## 2017-03-04 PROCEDURE — 41000018 ZZH PER-PERFUSION 1ST 30 MIN: Performed by: TRANSPLANT SURGERY

## 2017-03-04 PROCEDURE — 88342 IMHCHEM/IMCYTCHM 1ST ANTB: CPT | Performed by: TRANSPLANT SURGERY

## 2017-03-04 PROCEDURE — 82330 ASSAY OF CALCIUM: CPT

## 2017-03-04 PROCEDURE — C1769 GUIDE WIRE: HCPCS | Performed by: TRANSPLANT SURGERY

## 2017-03-04 PROCEDURE — 40000014 ZZH STATISTIC ARTERIAL MONITORING DAILY

## 2017-03-04 PROCEDURE — 25000128 H RX IP 250 OP 636: Performed by: STUDENT IN AN ORGANIZED HEALTH CARE EDUCATION/TRAINING PROGRAM

## 2017-03-04 PROCEDURE — 87070 CULTURE OTHR SPECIMN AEROBIC: CPT | Performed by: TRANSPLANT SURGERY

## 2017-03-04 PROCEDURE — 81200005 ZZH ACQUISITION LIVER CADAVER DONOR

## 2017-03-04 PROCEDURE — 82330 ASSAY OF CALCIUM: CPT | Performed by: INTERNAL MEDICINE

## 2017-03-04 PROCEDURE — 27210447 ZZH PACK CELL SAVER CSP: Performed by: TRANSPLANT SURGERY

## 2017-03-04 PROCEDURE — 82803 BLOOD GASES ANY COMBINATION: CPT

## 2017-03-04 PROCEDURE — 94002 VENT MGMT INPAT INIT DAY: CPT

## 2017-03-04 PROCEDURE — 37000009 ZZH ANESTHESIA TECHNICAL FEE, EACH ADDTL 15 MIN: Performed by: TRANSPLANT SURGERY

## 2017-03-04 PROCEDURE — 87102 FUNGUS ISOLATION CULTURE: CPT | Performed by: TRANSPLANT SURGERY

## 2017-03-04 PROCEDURE — 84132 ASSAY OF SERUM POTASSIUM: CPT | Performed by: INTERNAL MEDICINE

## 2017-03-04 PROCEDURE — 85384 FIBRINOGEN ACTIVITY: CPT | Performed by: INTERNAL MEDICINE

## 2017-03-04 PROCEDURE — 40000275 ZZH STATISTIC RCP TIME EA 10 MIN

## 2017-03-04 PROCEDURE — 80076 HEPATIC FUNCTION PANEL: CPT | Performed by: TRANSPLANT SURGERY

## 2017-03-04 PROCEDURE — 87640 STAPH A DNA AMP PROBE: CPT | Performed by: TRANSPLANT SURGERY

## 2017-03-04 PROCEDURE — 85049 AUTOMATED PLATELET COUNT: CPT | Performed by: INTERNAL MEDICINE

## 2017-03-04 PROCEDURE — 25000132 ZZH RX MED GY IP 250 OP 250 PS 637: Performed by: INTERNAL MEDICINE

## 2017-03-04 PROCEDURE — 80048 BASIC METABOLIC PNL TOTAL CA: CPT | Performed by: TRANSPLANT SURGERY

## 2017-03-04 PROCEDURE — P9012 CRYOPRECIPITATE EACH UNIT: HCPCS | Performed by: INTERNAL MEDICINE

## 2017-03-04 PROCEDURE — 99291 CRITICAL CARE FIRST HOUR: CPT | Mod: GC | Performed by: INTERNAL MEDICINE

## 2017-03-04 PROCEDURE — 40000196 ZZH STATISTIC RAPCV CVP MONITORING

## 2017-03-04 PROCEDURE — 85730 THROMBOPLASTIN TIME PARTIAL: CPT

## 2017-03-04 PROCEDURE — 36415 COLL VENOUS BLD VENIPUNCTURE: CPT | Performed by: INTERNAL MEDICINE

## 2017-03-04 PROCEDURE — 0FY00Z0 TRANSPLANTATION OF LIVER, ALLOGENEIC, OPEN APPROACH: ICD-10-PCS | Performed by: TRANSPLANT SURGERY

## 2017-03-04 PROCEDURE — 82805 BLOOD GASES W/O2 SATURATION: CPT | Performed by: TRANSPLANT SURGERY

## 2017-03-04 PROCEDURE — 40000344 ZZHCL STATISTIC THAWING COMPONENT: Performed by: INTERNAL MEDICINE

## 2017-03-04 PROCEDURE — 00000146 ZZHCL STATISTIC GLUCOSE BY METER IP

## 2017-03-04 PROCEDURE — 25800025 ZZH RX 258: Performed by: NURSE ANESTHETIST, CERTIFIED REGISTERED

## 2017-03-04 PROCEDURE — 85027 COMPLETE CBC AUTOMATED: CPT | Performed by: INTERNAL MEDICINE

## 2017-03-04 PROCEDURE — 40000048 ZZH STATISTIC DAILY SWAN MONITORING

## 2017-03-04 PROCEDURE — P9059 PLASMA, FRZ BETWEEN 8-24HOUR: HCPCS | Performed by: INTERNAL MEDICINE

## 2017-03-04 PROCEDURE — 25000125 ZZHC RX 250: Performed by: INTERNAL MEDICINE

## 2017-03-04 PROCEDURE — 37000008 ZZH ANESTHESIA TECHNICAL FEE, 1ST 30 MIN: Performed by: TRANSPLANT SURGERY

## 2017-03-04 DEVICE — STENT URETERAL FIRM 6FRX28CM W/O GW G23407
Type: IMPLANTABLE DEVICE | Status: NON-FUNCTIONAL
Removed: 2017-04-17

## 2017-03-04 RX ORDER — PREDNISONE 10 MG/1
10 TABLET ORAL ONCE
Status: COMPLETED | OUTPATIENT
Start: 2017-03-09 | End: 2017-03-09

## 2017-03-04 RX ORDER — METHYLPREDNISOLONE SODIUM SUCCINATE 125 MG/2ML
50 INJECTION, POWDER, LYOPHILIZED, FOR SOLUTION INTRAMUSCULAR; INTRAVENOUS ONCE
Status: COMPLETED | OUTPATIENT
Start: 2017-03-07 | End: 2017-03-07

## 2017-03-04 RX ORDER — VALGANCICLOVIR 450 MG/1
450 TABLET, FILM COATED ORAL EVERY OTHER DAY
Status: DISCONTINUED | OUTPATIENT
Start: 2017-03-04 | End: 2017-03-08

## 2017-03-04 RX ORDER — MYCOPHENOLATE MOFETIL 250 MG/1
1000 CAPSULE ORAL
Status: DISCONTINUED | OUTPATIENT
Start: 2017-03-04 | End: 2017-03-10 | Stop reason: HOSPADM

## 2017-03-04 RX ORDER — ACETAMINOPHEN 325 MG/1
325-650 TABLET ORAL EVERY 4 HOURS PRN
Status: DISCONTINUED | OUTPATIENT
Start: 2017-03-04 | End: 2017-03-10 | Stop reason: HOSPADM

## 2017-03-04 RX ORDER — PROPOFOL 10 MG/ML
5-75 INJECTION, EMULSION INTRAVENOUS CONTINUOUS
Status: DISCONTINUED | OUTPATIENT
Start: 2017-03-04 | End: 2017-03-05

## 2017-03-04 RX ORDER — FENTANYL CITRATE 50 UG/ML
INJECTION, SOLUTION INTRAMUSCULAR; INTRAVENOUS PRN
Status: DISCONTINUED | OUTPATIENT
Start: 2017-03-04 | End: 2017-03-04

## 2017-03-04 RX ORDER — POTASSIUM CHLORIDE 750 MG/1
20-40 TABLET, EXTENDED RELEASE ORAL
Status: DISCONTINUED | OUTPATIENT
Start: 2017-03-04 | End: 2017-03-06

## 2017-03-04 RX ORDER — SODIUM CHLORIDE 9 MG/ML
INJECTION, SOLUTION INTRAVENOUS CONTINUOUS PRN
Status: DISCONTINUED | OUTPATIENT
Start: 2017-03-04 | End: 2017-03-04

## 2017-03-04 RX ORDER — ALBUMIN, HUMAN INJ 5% 5 %
25 SOLUTION INTRAVENOUS ONCE
Status: COMPLETED | OUTPATIENT
Start: 2017-03-04 | End: 2017-03-04

## 2017-03-04 RX ORDER — POTASSIUM CHLORIDE 7.45 MG/ML
10 INJECTION INTRAVENOUS
Status: DISCONTINUED | OUTPATIENT
Start: 2017-03-04 | End: 2017-03-06

## 2017-03-04 RX ORDER — POTASSIUM CHLORIDE 29.8 MG/ML
20 INJECTION INTRAVENOUS
Status: DISCONTINUED | OUTPATIENT
Start: 2017-03-04 | End: 2017-03-06

## 2017-03-04 RX ORDER — MAGNESIUM SULFATE HEPTAHYDRATE 40 MG/ML
4 INJECTION, SOLUTION INTRAVENOUS EVERY 4 HOURS PRN
Status: DISCONTINUED | OUTPATIENT
Start: 2017-03-04 | End: 2017-03-06

## 2017-03-04 RX ORDER — CALCIUM CHLORIDE 100 MG/ML
INJECTION INTRAVENOUS; INTRAVENTRICULAR PRN
Status: DISCONTINUED | OUTPATIENT
Start: 2017-03-04 | End: 2017-03-04

## 2017-03-04 RX ORDER — VALGANCICLOVIR HYDROCHLORIDE 50 MG/ML
450 POWDER, FOR SOLUTION ORAL EVERY OTHER DAY
Status: DISCONTINUED | OUTPATIENT
Start: 2017-03-04 | End: 2017-03-08

## 2017-03-04 RX ORDER — EPHEDRINE SULFATE 50 MG/ML
INJECTION, SOLUTION INTRAMUSCULAR; INTRAVENOUS; SUBCUTANEOUS PRN
Status: DISCONTINUED | OUTPATIENT
Start: 2017-03-04 | End: 2017-03-04

## 2017-03-04 RX ORDER — MYCOPHENOLATE MOFETIL 200 MG/ML
1000 POWDER, FOR SUSPENSION ORAL
Status: DISCONTINUED | OUTPATIENT
Start: 2017-03-04 | End: 2017-03-10 | Stop reason: HOSPADM

## 2017-03-04 RX ORDER — METHYLPREDNISOLONE SODIUM SUCCINATE 125 MG/2ML
100 INJECTION, POWDER, LYOPHILIZED, FOR SOLUTION INTRAMUSCULAR; INTRAVENOUS ONCE
Status: COMPLETED | OUTPATIENT
Start: 2017-03-06 | End: 2017-03-06

## 2017-03-04 RX ORDER — HEPARIN SODIUM 1000 [USP'U]/ML
INJECTION, SOLUTION INTRAVENOUS; SUBCUTANEOUS PRN
Status: DISCONTINUED | OUTPATIENT
Start: 2017-03-04 | End: 2017-03-04 | Stop reason: HOSPADM

## 2017-03-04 RX ORDER — LIDOCAINE HYDROCHLORIDE 20 MG/ML
INJECTION, SOLUTION INFILTRATION; PERINEURAL PRN
Status: DISCONTINUED | OUTPATIENT
Start: 2017-03-04 | End: 2017-03-04

## 2017-03-04 RX ORDER — PIPERACILLIN SODIUM, TAZOBACTAM SODIUM 2; .25 G/10ML; G/10ML
2.25 INJECTION, POWDER, LYOPHILIZED, FOR SOLUTION INTRAVENOUS EVERY 6 HOURS
Status: COMPLETED | OUTPATIENT
Start: 2017-03-05 | End: 2017-03-06

## 2017-03-04 RX ORDER — GLYCOPYRROLATE 0.2 MG/ML
INJECTION, SOLUTION INTRAMUSCULAR; INTRAVENOUS PRN
Status: DISCONTINUED | OUTPATIENT
Start: 2017-03-04 | End: 2017-03-04

## 2017-03-04 RX ORDER — ALBUMIN, HUMAN INJ 5% 5 %
SOLUTION INTRAVENOUS CONTINUOUS PRN
Status: DISCONTINUED | OUTPATIENT
Start: 2017-03-04 | End: 2017-03-04

## 2017-03-04 RX ORDER — PROPOFOL 10 MG/ML
INJECTION, EMULSION INTRAVENOUS PRN
Status: DISCONTINUED | OUTPATIENT
Start: 2017-03-04 | End: 2017-03-04

## 2017-03-04 RX ORDER — SULFAMETHOXAZOLE AND TRIMETHOPRIM 400; 80 MG/1; MG/1
1 TABLET ORAL DAILY
Status: DISCONTINUED | OUTPATIENT
Start: 2017-03-05 | End: 2017-03-10 | Stop reason: HOSPADM

## 2017-03-04 RX ORDER — PAPAVERINE HYDROCHLORIDE 30 MG/ML
INJECTION INTRAMUSCULAR; INTRAVENOUS PRN
Status: DISCONTINUED | OUTPATIENT
Start: 2017-03-04 | End: 2017-03-04 | Stop reason: HOSPADM

## 2017-03-04 RX ORDER — SODIUM CHLORIDE 9 MG/ML
INJECTION, SOLUTION INTRAVENOUS
Status: DISCONTINUED
Start: 2017-03-04 | End: 2017-03-05 | Stop reason: HOSPADM

## 2017-03-04 RX ORDER — SODIUM CHLORIDE, SODIUM LACTATE, POTASSIUM CHLORIDE, CALCIUM CHLORIDE 600; 310; 30; 20 MG/100ML; MG/100ML; MG/100ML; MG/100ML
INJECTION, SOLUTION INTRAVENOUS CONTINUOUS PRN
Status: DISCONTINUED | OUTPATIENT
Start: 2017-03-04 | End: 2017-03-04

## 2017-03-04 RX ORDER — PROPOFOL 10 MG/ML
INJECTION, EMULSION INTRAVENOUS CONTINUOUS PRN
Status: DISCONTINUED | OUTPATIENT
Start: 2017-03-04 | End: 2017-03-04

## 2017-03-04 RX ORDER — FUROSEMIDE 10 MG/ML
INJECTION INTRAMUSCULAR; INTRAVENOUS PRN
Status: DISCONTINUED | OUTPATIENT
Start: 2017-03-04 | End: 2017-03-04

## 2017-03-04 RX ORDER — POTASSIUM CHLORIDE 1.5 G/1.58G
20-40 POWDER, FOR SOLUTION ORAL
Status: DISCONTINUED | OUTPATIENT
Start: 2017-03-04 | End: 2017-03-06

## 2017-03-04 RX ORDER — METHYLPREDNISOLONE SODIUM SUCCINATE 1 G/16ML
INJECTION, POWDER, LYOPHILIZED, FOR SOLUTION INTRAMUSCULAR; INTRAVENOUS PRN
Status: DISCONTINUED | OUTPATIENT
Start: 2017-03-04 | End: 2017-03-04

## 2017-03-04 RX ADMIN — Medication 5 UNITS/HR: at 14:57

## 2017-03-04 RX ADMIN — PHENYLEPHRINE HYDROCHLORIDE 100 MCG: 10 INJECTION, SOLUTION INTRAMUSCULAR; INTRAVENOUS; SUBCUTANEOUS at 17:00

## 2017-03-04 RX ADMIN — CALCIUM CHLORIDE 200 MG: 100 INJECTION, SOLUTION INTRAVENOUS at 15:17

## 2017-03-04 RX ADMIN — MIDAZOLAM HYDROCHLORIDE 1 MG: 1 INJECTION, SOLUTION INTRAMUSCULAR; INTRAVENOUS at 14:57

## 2017-03-04 RX ADMIN — SODIUM BICARBONATE 50 MEQ: 84 INJECTION INTRAVENOUS at 17:21

## 2017-03-04 RX ADMIN — PHENYLEPHRINE HYDROCHLORIDE 100 MCG: 10 INJECTION, SOLUTION INTRAMUSCULAR; INTRAVENOUS; SUBCUTANEOUS at 15:08

## 2017-03-04 RX ADMIN — MYCOPHENOLATE MOFETIL 1000 MG: 200 POWDER, FOR SUSPENSION ORAL at 22:42

## 2017-03-04 RX ADMIN — FENTANYL CITRATE 100 MCG: 50 INJECTION, SOLUTION INTRAMUSCULAR; INTRAVENOUS at 14:35

## 2017-03-04 RX ADMIN — SODIUM CHLORIDE: 9 INJECTION, SOLUTION INTRAVENOUS at 12:40

## 2017-03-04 RX ADMIN — PIPERACILLIN AND TAZOBACTAM 2.25 G: 2; .25 INJECTION, POWDER, LYOPHILIZED, FOR SOLUTION INTRAVENOUS; PARENTERAL at 23:49

## 2017-03-04 RX ADMIN — FENTANYL CITRATE 150 MCG: 50 INJECTION, SOLUTION INTRAMUSCULAR; INTRAVENOUS at 12:59

## 2017-03-04 RX ADMIN — SODIUM BICARBONATE 650 MG TABLET 1300 MG: at 09:07

## 2017-03-04 RX ADMIN — PHENYLEPHRINE HYDROCHLORIDE 200 MCG: 10 INJECTION, SOLUTION INTRAMUSCULAR; INTRAVENOUS; SUBCUTANEOUS at 15:14

## 2017-03-04 RX ADMIN — PHENYLEPHRINE HYDROCHLORIDE 100 MCG: 10 INJECTION, SOLUTION INTRAMUSCULAR; INTRAVENOUS; SUBCUTANEOUS at 17:15

## 2017-03-04 RX ADMIN — VALGANCICLOVIR HYDROCHLORIDE 450 MG: 50 POWDER, FOR SOLUTION ORAL at 22:48

## 2017-03-04 RX ADMIN — VASOPRESSIN 1 UNITS: 20 INJECTION, SOLUTION INTRAMUSCULAR; SUBCUTANEOUS at 15:28

## 2017-03-04 RX ADMIN — CALCIUM CHLORIDE 1000 MG: 100 INJECTION, SOLUTION INTRAVENOUS at 17:23

## 2017-03-04 RX ADMIN — MIDAZOLAM HYDROCHLORIDE 1 MG: 1 INJECTION, SOLUTION INTRAMUSCULAR; INTRAVENOUS at 12:49

## 2017-03-04 RX ADMIN — ROCURONIUM BROMIDE 50 MG: 10 INJECTION INTRAVENOUS at 17:03

## 2017-03-04 RX ADMIN — METHYLPREDNISOLONE SODIUM SUCCINATE 1000 MG: 1 INJECTION, POWDER, FOR SOLUTION INTRAMUSCULAR; INTRAVENOUS at 17:23

## 2017-03-04 RX ADMIN — NOREPINEPHRINE BITARTRATE 0.03 MCG/KG/MIN: 1 INJECTION INTRAVENOUS at 14:45

## 2017-03-04 RX ADMIN — GLYCOPYRROLATE 0.2 MG: 0.2 INJECTION, SOLUTION INTRAMUSCULAR; INTRAVENOUS at 15:00

## 2017-03-04 RX ADMIN — ALBUMIN (HUMAN): 12.5 SOLUTION INTRAVENOUS at 15:06

## 2017-03-04 RX ADMIN — PHENYLEPHRINE HYDROCHLORIDE 100 MCG: 10 INJECTION, SOLUTION INTRAMUSCULAR; INTRAVENOUS; SUBCUTANEOUS at 13:02

## 2017-03-04 RX ADMIN — WHITE PETROLATUM MINERAL OIL 1 INCH: 150; 830 OINTMENT OPHTHALMIC at 13:10

## 2017-03-04 RX ADMIN — PIPERACILLIN SODIUM AND TAZOBACTAM SODIUM 3.38 G: .375; 3 INJECTION, POWDER, LYOPHILIZED, FOR SOLUTION INTRAVENOUS at 13:45

## 2017-03-04 RX ADMIN — FENTANYL CITRATE 50 MCG/HR: 50 INJECTION, SOLUTION INTRAMUSCULAR; INTRAVENOUS at 22:22

## 2017-03-04 RX ADMIN — CISATRACURIUM BESYLATE 10 MG: 2 INJECTION INTRAVENOUS at 19:30

## 2017-03-04 RX ADMIN — PROPOFOL 40 MCG/KG/MIN: 10 INJECTION, EMULSION INTRAVENOUS at 22:43

## 2017-03-04 RX ADMIN — OMEPRAZOLE 20 MG: 20 CAPSULE, DELAYED RELEASE ORAL at 09:14

## 2017-03-04 RX ADMIN — SODIUM CHLORIDE, POTASSIUM CHLORIDE, SODIUM LACTATE AND CALCIUM CHLORIDE: 600; 310; 30; 20 INJECTION, SOLUTION INTRAVENOUS at 13:45

## 2017-03-04 RX ADMIN — Medication 5 MG: at 15:01

## 2017-03-04 RX ADMIN — FUROSEMIDE 10 MG: 10 INJECTION, SOLUTION INTRAVENOUS at 18:25

## 2017-03-04 RX ADMIN — PHENYLEPHRINE HYDROCHLORIDE 100 MCG: 10 INJECTION, SOLUTION INTRAMUSCULAR; INTRAVENOUS; SUBCUTANEOUS at 13:05

## 2017-03-04 RX ADMIN — PHENYLEPHRINE HYDROCHLORIDE 100 MCG: 10 INJECTION, SOLUTION INTRAMUSCULAR; INTRAVENOUS; SUBCUTANEOUS at 17:05

## 2017-03-04 RX ADMIN — DEXTROSE AND SODIUM CHLORIDE: 5; 450 INJECTION, SOLUTION INTRAVENOUS at 21:20

## 2017-03-04 RX ADMIN — PROPOFOL 200 MG: 10 INJECTION, EMULSION INTRAVENOUS at 12:59

## 2017-03-04 RX ADMIN — ALBUMIN (HUMAN) 25 G: 12.5 SOLUTION INTRAVENOUS at 22:33

## 2017-03-04 RX ADMIN — PHENYLEPHRINE HYDROCHLORIDE 100 MCG: 10 INJECTION, SOLUTION INTRAMUSCULAR; INTRAVENOUS; SUBCUTANEOUS at 13:07

## 2017-03-04 RX ADMIN — PIPERACILLIN SODIUM AND TAZOBACTAM SODIUM 2.25 G: .375; 3 INJECTION, POWDER, LYOPHILIZED, FOR SOLUTION INTRAVENOUS at 17:45

## 2017-03-04 RX ADMIN — LIDOCAINE HYDROCHLORIDE 100 MG: 20 INJECTION, SOLUTION INFILTRATION; PERINEURAL at 12:59

## 2017-03-04 RX ADMIN — PROPOFOL 50 MCG/KG/MIN: 10 INJECTION, EMULSION INTRAVENOUS at 20:13

## 2017-03-04 RX ADMIN — VASOPRESSIN 1 UNITS: 20 INJECTION, SOLUTION INTRAMUSCULAR; SUBCUTANEOUS at 17:26

## 2017-03-04 RX ADMIN — SODIUM BICARBONATE 50 MEQ: 84 INJECTION INTRAVENOUS at 17:33

## 2017-03-04 RX ADMIN — MIDAZOLAM HYDROCHLORIDE 3 MG: 1 INJECTION, SOLUTION INTRAMUSCULAR; INTRAVENOUS at 20:00

## 2017-03-04 RX ADMIN — PROPOFOL 35 MCG/KG/MIN: 10 INJECTION, EMULSION INTRAVENOUS at 23:45

## 2017-03-04 RX ADMIN — ROCURONIUM BROMIDE 50 MG: 10 INJECTION INTRAVENOUS at 15:53

## 2017-03-04 RX ADMIN — Medication: at 18:31

## 2017-03-04 RX ADMIN — ROCURONIUM BROMIDE 50 MG: 10 INJECTION INTRAVENOUS at 18:05

## 2017-03-04 RX ADMIN — VANCOMYCIN HYDROCHLORIDE 1 G: 1 INJECTION, SOLUTION INTRAVENOUS at 14:12

## 2017-03-04 RX ADMIN — ROCURONIUM BROMIDE 100 MG: 10 INJECTION INTRAVENOUS at 12:59

## 2017-03-04 RX ADMIN — PIPERACILLIN SODIUM AND TAZOBACTAM SODIUM 2.25 G: .375; 3 INJECTION, POWDER, LYOPHILIZED, FOR SOLUTION INTRAVENOUS at 15:50

## 2017-03-04 RX ADMIN — Medication 0.03 MCG/KG/MIN: at 17:17

## 2017-03-04 RX ADMIN — SODIUM CHLORIDE: 4.5 INJECTION, SOLUTION INTRAVENOUS at 21:20

## 2017-03-04 RX ADMIN — ONDANSETRON 4 MG: 4 TABLET, ORALLY DISINTEGRATING ORAL at 09:14

## 2017-03-04 RX ADMIN — VASOPRESSIN 1 UNITS: 20 INJECTION, SOLUTION INTRAMUSCULAR; SUBCUTANEOUS at 15:19

## 2017-03-04 RX ADMIN — SODIUM BICARBONATE 50 MEQ: 84 INJECTION INTRAVENOUS at 16:31

## 2017-03-04 RX ADMIN — NOREPINEPHRINE BITARTRATE 0.08 MCG/KG/MIN: 1 INJECTION INTRAVENOUS at 21:00

## 2017-03-04 RX ADMIN — MICAFUNGIN SODIUM 150 MG: 10 INJECTION, POWDER, LYOPHILIZED, FOR SOLUTION INTRAVENOUS at 13:45

## 2017-03-04 RX ADMIN — PHENYLEPHRINE HYDROCHLORIDE 100 MCG: 10 INJECTION, SOLUTION INTRAMUSCULAR; INTRAVENOUS; SUBCUTANEOUS at 16:39

## 2017-03-04 RX ADMIN — HUMAN INSULIN 5 UNITS/HR: 100 INJECTION, SOLUTION SUBCUTANEOUS at 21:24

## 2017-03-04 RX ADMIN — LACTULOSE 20 G: 20 SOLUTION ORAL at 09:07

## 2017-03-04 RX ADMIN — PHENYLEPHRINE HYDROCHLORIDE 100 MCG: 10 INJECTION, SOLUTION INTRAMUSCULAR; INTRAVENOUS; SUBCUTANEOUS at 15:11

## 2017-03-04 RX ADMIN — SODIUM CHLORIDE: 9 INJECTION, SOLUTION INTRAVENOUS at 14:00

## 2017-03-04 RX ADMIN — CALCIUM CHLORIDE 1000 MG: 100 INJECTION, SOLUTION INTRAVENOUS at 17:03

## 2017-03-04 RX ADMIN — RIFAXIMIN 550 MG: 550 TABLET ORAL at 09:07

## 2017-03-04 NOTE — ANESTHESIA PROCEDURE NOTES
Central Line Procedure Note  Staff:     Anesthesiologist:  NAYELY VELASCO  Location: In OR after induction  Procedure Start/Stop Times:     patient identified, IV checked, site marked, risks and benefits discussed, informed consent, monitors and equipment checked, pre-op evaluation and at physician/surgeon's request      Correct Patient: Yes      Correct Position: Yes      Correct Site: Yes      Correct Procedure: Yes      Correct Laterality:  Yes    Site Marked:  N/A  Line Placement:     Procedure:  Central Line    Insertion laterality:  Right    Insertion site:  Internal Jugular    Position:  Trendelenburg      Maximal Sterile Barriers: All elements of maximal sterile barrier technique followed      (Maximal sterile barriers include:   Sterile gown, Sterile Gloves, Mask, Cap, Whole body draped, hand hygiene and acceptable skin prep).Skin Prep: Chloraprep         Injection Technique:  Ultrasound guided    Sterile Ultrasound Technique:  Sterile probe cover and Sterile gel    Vein evaluated via U/S for patency/adequacy of catheter insertion and is adequate.  Using realtime U/S imaging the vein was punctured, and needle was observed entering vein on U/S      Permanent Image entered into patient's record      Local skin infiltration:  None    Catheter size:  9 Fr, 2 lumen 11.5 cm (MAC)    Cath secured with: suture      Dressing:  Tegaderm and Biopatch    Complications:  None obvious    Blood aspirated all lumens: Yes      All Lumens Flushed: Yes      Verification method:  Ultrasound    Person verifying:  Samira

## 2017-03-04 NOTE — PLAN OF CARE
Problem: Goal Outcome Summary  Goal: Goal Outcome Summary  Outcome: No Change  Afebrile, VSS.  Crowe placed today for strict I&O due to kidney failure.  Abdominal x-ray showed worsening ileus per MDs.  Lactulose enema ordered and patient initially refused enema.  However, got offer for liver transplant this afternoon and transplant surgeon enforced benefit of enema.  Patient agreeable to rectal tube and enema but would like to wait until guests leave this evening before administration.  Due time changed in MAR.  Remains alert and oriented and awake throughout the day.  Worked with therapies with some encouragement.  Liver offer this afternoon.  Pre-op orders released.  Labs drawn, EKG done, chest x-ray complete, urine sent.  NPO after 8pm per Dr. Cross.  First pre-op wash to be completed this evening, second in AM.  Patient approaching fluid limit but NPO at 8pm.  High blood sugar this evening but per patient's family, patient drank an ensure that nursing did not know about so did not get covered.  Endocrine aware.  Continue plan of care.

## 2017-03-04 NOTE — ANESTHESIA PROCEDURE NOTES
Central Line Procedure Note  Staff:     Anesthesiologist:  NAYELY VELASCO  Location: In OR after induction  Procedure Start/Stop Times:     patient identified, IV checked, site marked, risks and benefits discussed, informed consent, monitors and equipment checked, pre-op evaluation and at physician/surgeon's request      Correct Patient: Yes      Correct Position: Yes      Correct Site: Yes      Correct Procedure: Yes      Correct Laterality:  Yes    Site Marked:  N/A  Line Placement:     Procedure:  Central Line    Insertion laterality:  Right    Insertion site:  Internal Jugular    Position:  Trendelenburg      Maximal Sterile Barriers: All elements of maximal sterile barrier technique followed      (Maximal sterile barriers include:   Sterile gown, Sterile Gloves, Mask, Cap, Whole body draped, hand hygiene and acceptable skin prep).Skin Prep: Chloraprep         Injection Technique:  Ultrasound guided    Sterile Ultrasound Technique:  Sterile probe cover and Sterile gel    Vein evaluated via U/S for patency/adequacy of catheter insertion and is adequate.  Using realtime U/S imaging the vein was punctured, and needle was observed entering vein on U/S      Permanent Image entered into patient's record      Local skin infiltration:  None    Catheter size:  9 Fr, 2 lumen 11.5 cm (MAC)    Cath secured with: suture      Dressing:  Tegaderm    Complications:  None obvious    Blood aspirated all lumens: Yes      All Lumens Flushed: Yes      Tip termination: right atrium      Verification method:  Ultrasound    Person verifying:  Samira

## 2017-03-04 NOTE — PROGRESS NOTES
[facility]   [facility]  Daily Progress Note    Date of Admission: 2/21/2017  Date of Service: 03/04/2017    Camacho Bhagat MRN# [id number]   Age: 51 year old YOB: 1965     Interval History     Feels well this morning. Family is at bedside - he is awaiting transport to the OR - pre op scrub completed.  Denied any shortness of breath, abdominal pain.  Stooling appropriately.  Required enema overnight.  Now feels well.  NPO yesterday evening.  No fever, chills.  Stable vital signs.      Medications     Medications and allergies reviewed in [facility].  Changes are reflected in the assessment and plan.      Physical Exam   /52 (BP Location: Left arm)  Pulse 65  Temp 97.5  F (36.4  C) (Oral)  Resp 18  Ht 1.829 m (6')  Wt 98.3 kg (216 lb 11.4 oz)  SpO2 100%  BMI 29.39 kg/m2    Wt Readings from Last 1 Encounters:   03/04/17 98.3 kg (216 lb 11.4 oz)    Body mass index is 29.39 kg/(m^2).     Physical Exam   Alert, awake, and oriented x 3. No acute distress.  Resting in bed comfortably.   HENT: MM moist.  Head: Normocephalic and atraumatic.   Eyes: EOM are normal. Pupils are equal, round, and reactive to light. Scleral icterus is present.   Neck: Neck supple.   Cardiovascular: Normal rate and regular rhythm.   Pulmonary/Chest: Effort normal and breath sounds normal. No respiratory distress. He has no wheezes.   Abdominal: Soft. Bowel sounds normoactive, distended but not tense. There is no tenderness. There is no rebound and no guarding.   Musculoskeletal: Normal range of motion. Trace lower extremity edema.    Neurological: Alert, awake, and, oriented to person, place and time.  No gross focal findings.    Skin: Skin is warm. No erythema.     Drains and Tubes: None.      Intravascular Access and Device: Peripheral IV x 2     Data     Labs & Studies of Note: Labs reviewed in [facility].      Imaging:   Recent Results (from the past 24 hour(s))   XR Chest 2 Views    Narrative    Exam: XR CHEST 2 VW,  3/3/2017 3:14 PM    Indication:  donor transplant going to OR NOW : End-stage  liver disease, hepatocellular carcinoma,    Comparison: 2017    Findings:   Cardiac silhouette is not enlarged. Small left pleural effusion with  left basilar opacities. No pneumothorax. Visualized upper abdomen and  osseous structures are unremarkable. No acute airspace disease.  Prominent small bowel loops.      Impression    Impression: Small left pleural effusion with basilar atelectasis.         Main Plans for Today     - Anticipate transplantation this afternoon - notified by the transplant team.      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 Now admitted again due to concern for worsening encephalopathy and infection.       Hepatic Encephalopathy(improved): On admission concern for infection vs. Non compliance with lactulose. Head CT on admission was negative for any acute intracranial process.   - Lactulose 3 times daily - PO. Titrate for 3-5 stools per day.   - Rifaximin 550 mg bid  - PRN Lactulose PO vs enemas per mental status.     Ileus/abdominal pain:   - Clinically improved. Stooling overnight. Abdominal exam remains benign. No nausea or vomiting.        Cryptogenic cirrhosis/CASTAÑEDA   HCC S/P TACE 2016:   Recurrent Ascites/SBP:   - US with dopplers 17: No evidence of portal vein thrombus.    - D/C Zosyn  as there is no clear sign of infection. Ciprofloxacin held due to concern for AIN/rising creatinine.    - Diagnostic paracentesis  - negative for SBP  - Diagnostic/therapeutic  para negative for SBP - removed 4 liters.   - UA  negative.   - Hepatology consulted - appreciate recs.   - Awaiting transplantation this afternoon after he was notified of an offer on 3/3 - defer to  transplant team for further management.        Acute Kidney Injury - possible due to AIN vs bilirubin cast nephropathy in setting of diuretics:   Hyponatremia (hypervolemic):   Hyperkalemia(resolved):   : Pt with recurrent issues with hyperkalemia - thought to be related to his ACEI and Aldactone - however he has been discontinued on this medication on recent discharges. K was 6 on admission - now improved.   - RHC on 2/24 with increased filling pressures.   - Rise in creatinine - DDx includes AIN, vs. Bilirubin cast nephropathy in the setting of diuretics.    - No hydronephrosis on US 3/1  - UA with no clear signs of infection.   - Cr now stable.  Nephrology now following and provided perioperative recommendations for dialysis if needed.          Urinary retention  H/O Scrotal edema - Has been recent evaluated by urology. Intermittently has needed a vazquez catheter due to retention on recent admissions. Recurrent issue again requiring vazquez catheter placement on admit. Evaluated for a scrotal mass on last admit - signs of possible inflammatory changes without a drainable fluid collection.   - Recurrent scrotal irritation - no signs of infection - topical antifungals PRN  - Vazquez catheter in place.        Type II DM:  - Endocrine consulted 2/27- appreciate recs.    - Insulin gtt d/c'd - transitioned to subq Insulin on 3/2  - Hypoglycemia protocol     ------------------------------------------------------------------------------------------------------------------  Prophylaxis:   -GI: PPI  -DVT: None due to bleeding risk.       FEN: Mod carbohydrate diet - Nutrition consulted to help with dietary rec given recurrent hyperkalemia.   Consults: Hepatology, dietician, Cardiology, transplant surgery.   Family: Will update as needed.    Code status: Full code.   Disposition: Pending further transplant work up, improvement of kidney function, and further eval of worsening liver disease.    =========================================================  Patient was discussed and evaluated with Dr. Edilberto Martin MD    PGY2

## 2017-03-04 NOTE — PROGRESS NOTES
Temp: 97.3  F (36.3  C) Temp src: Oral BP: 110/51   Heart Rate: 69 Resp: 18 SpO2: 99 % O2 Device: None (Room air)    Pt A&Ox4. Afebrile. VSS. On room air. NSR, 60s. Neuro's intact. No c/o pain. Lung sounds clear. Crowe patent with adequate UOP. Rectal tube inserted for 1x dose of Lactulose enema, medication not retained by pt: MD aware. Rectal tube removed per pt request. Rectal swab for VRE sent to lab. Has been NPO since 2000. Pre-op scrub x1 done, next scrub due in am. Blood sugars 250s, carb coverage and sliding scale. Plan for OR time of 0800.

## 2017-03-04 NOTE — PROGRESS NOTES
Patient to OR for liver transplant.  Escorted by anesthesia.  Patient's family present.  Pre-op meds and chart sent with patient.  IV steroid never arrived from pharmacy after request.  Anesthesia RN will request from pharmacy once patient is in pre-op.  Telemetry monitoring removed.  Belongings being held in room until patient in OR then will send to ICU.

## 2017-03-04 NOTE — ANESTHESIA PROCEDURE NOTES
Arterial Line Procedure Note  Staff:     Anesthesiologist:  NAYELY VELASCO    Resident/CRNA:  ALIVIA KENNEDY    Arterial line performed by resident/CRNA in presence of a teaching physician    Location: In OR After Induction  Procedure Start/Stop Times:     patient identified, IV checked, site marked, risks and benefits discussed, informed consent, monitors and equipment checked, pre-op evaluation and at physician/surgeon's request      Correct Patient: Yes      Correct Position: Yes      Correct Site: Yes      Correct Procedure: Yes      Correct Laterality:  Yes    Site Marked:  N/A  Line Placement:     Procedure:  Arterial Line    Insertion Site:  Radial    Insertion laterality:  Left    Skin Prep: Chloraprep      Patient Prep: patient draped, mask, sterile gloves, hat and hand hygiene      Local skin infiltration:  None    Ultrasound Guided?: Yes      Artery evaluated via ultrasound confirming patency.   Using realtime imaging, the artery was punctured and the needle was observed entering the artery.      A permanent image is entered into patient's chart.      Catheter size:  20 gauge, Quick cath    Cath secured with: suture      Dressing:  Tegaderm    Complications:  None obvious    Arterial waveform: Yes      IBP within 10% of NIBP: Yes

## 2017-03-04 NOTE — H&P
SURGICAL ICU ADMISSION NOTE  3/4/2017    PRIMARY TEAM: Transplant  PRIMARY PHYSICIAN: Dr. Tran    REASON FOR CRITICAL CARE ADMISSION: Post operative management of liver transplantation  ADMITTING PHYSICIAN: Dr. Mariano Shine    ASSESSMENT: 52 y/o male with cirrhosis of the liver due to CASTAÑEDA complicated by HCC and admitted to the hospital on 2/21/17 for hepatic encephalopathy. He is POD #0 for DDOLT on 3/4/17.     PLAN:   Neuro/ pain/ sedation:  History of hepatic encephalopathy, depressive d/o  - Monitor neurological status. Notify the MD for any acute changes in exam.  - Propofol gtt for sedation  - Fentanyl gtt for pain  - Hold PTA gabapentin, flexeril     Pulmonary care:   - Wean to extubate in the morning  - CMV ventilation      Cardiovascular:    History of HTB  - Monitor hemodynamic status.   - Keep SBP > 110  - Vasopressor of choice if needed: levophed  - Hold PTA lisinopril, furosemide, spironolactone     GI care:   History of CASTAÑEDA, HCC, GERD  S/P DDOLT on 3/4/2017   - NPO  - OG  - IV PPI     Fluids/ Electrolytes/ Nutrition:   - IVF resuscitation  - ICU electrolyte replacement protocol  - No indication for parenteral nutrition.  - Nutrition consulted. Appreciate recs     Renal/ Fluid Balance:    - Will monitor intake and output.  - Vazquez in place for strict I&O     Endocrine:    History of insulin-dependent type II DM  - insulin gtt  - Hold PTA insulin glargine/lispro     ID/ Antibiotics:  - Pre operative antibiotics: Zosyn  - Induction immunosupression by transplant      Heme:     - Keep hemoglobin >8  - Keep INR <2  - Keep fibrinogen > 200  - Keep Platelets >50     Prophylaxis:    - Mechanical prophylaxis for DVT.   - No chemical DVT prophylaxis due to high risk of bleeding.      MSK:   History of osteoarthritis, fibromyalgia   - PT and OT consulted. Appreciate recs.     Lines/ tubes/ drains:  - ETT, OG, swan, PIV, vazquez, TABITHA     Disposition:  - Surgical ICU.     Patient's findings and plan discussed  with ICU staff, Dr. Shine.    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   In terms of patient's liver history, patient is on the wait list for liver transplantation for cirrhosis caused by nonalcoholic fatty liver disease and complicated by hepatocellular carcinoma. He is status post chemoembolization x2 and radioembolization x1. He has had upper endoscopy procedure in 08/2016, which showed grade 1 esophageal varices. Patient has had a recent hospitalizations in 12/16 for AMS.   His operative course was uncomplicated. EBL 1000mL. He was admitted to the SICU post op.     REVIEW OF SYSTEMS: 10 point ROS neg other than the symptoms noted above in the HPI.    PAST MEDICAL HISTORY:   Past Medical History   Diagnosis Date     Acute venous embolism and thrombosis of other specified veins 11/01     Deep vein thrombophlebitis, filter placed     Cancer (H)      Depressive disorder, not elsewhere classified      Esophageal reflux      Fibromyalgia 1/2009     dx with Dr Benitez( Rheum)     Osteoarthritis      Other and unspecified hyperlipidemia      Other chronic nonalcoholic liver disease      Fatty liver      Other testicular hypofunction      Pneumonia, organism unspecified 10-01     Included ARDS, sepsis, and  acute renal failure; hospitalized     Rheumatoid arthritis(714.0)      Type II or unspecified type diabetes mellitus without mention of complication, not stated as uncontrolled 11/01     Managed by endocrinology     Unspecified essential hypertension 11/01     BPs run lower  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     Unspecified sleep apnea      Uses BiPAP     SURGICAL HISTORY:   Past Surgical History   Procedure Laterality Date     C nonspecific procedure       tracheostomy     C nonspecific procedure       repair of deviated septum     C nonspecific procedure  2007     Rt knee arthroscopy     C total knee arthroplasty  2008     Right knee arthroscopy     Cholecystectomy       Esophagoscopy, gastroscopy, duodenoscopy (egd), combined N/A 8/4/2016     Procedure: COMBINED ESOPHAGOSCOPY, GASTROSCOPY, DUODENOSCOPY (EGD), BIOPSY SINGLE OR MULTIPLE;  Surgeon: Trent Pederson MD;  Location:  GI       SOCIAL HISTORY:   Social History     Social History     Marital status:      Spouse name: N/A     Number of children: 1     Years of education: N/A     Occupational History            Social History Main Topics     Smoking status: Former Smoker     Smokeless tobacco: Former User     Types: Chew     Quit date: 10/8/2015      Comment: Has used chewing tobacco since age 15 , chewed for 20yrs      Alcohol use No      Comment: last drink about a year ago (quit 2001)     Drug use: No     Sexual activity: Yes     Partners: Female     Other Topics Concern     None     Social History Narrative    uSED TO BE      Back in school now           FAMILY HISTORY: No bleeding/clotting disorders nor problems with anesthesia.   - Cancer: mother  - Arthritis: mother  - Diabetes: Father  - Hypertension: Father  - Substance abuse: Father  - Thyroid disease: Mother    ALLERGIES:      Allergies   Allergen Reactions     Erythromycin GI Disturbance     Vioxx      Nausea, vomiting       MEDICATIONS:  Current Outpatient Prescriptions on File Prior to Encounter:  LISINOPRIL PO Take by mouth daily   insulin glargine (LANTUS) 100 UNIT/ML injection Inject 65 Units Subcutaneous every morning   insulin lispro (HUMALOG) 100 UNIT/ML injection Inject 20 Units Subcutaneous 3 times daily  (before meals)   furosemide (LASIX) 40 MG tablet Take 1 tablet (40 mg) by mouth daily   spironolactone (ALDACTONE) 50 MG tablet Take 1 tablet (50 mg) by mouth daily   rifaximin (XIFAXAN) 550 MG TABS tablet Take 1 tablet (550 mg) by mouth 2 times daily   ondansetron (ZOFRAN-ODT) 4 MG ODT tab Take 1 tablet (4 mg) by mouth every 8 hours as needed for nausea   glucagon 1 MG SOLR injection Inject 1 mg Subcutaneous every 15 minutes as needed for low blood sugar (May repeat x 1 only)   lactobacillus rhamnosus, GG, (CULTURELL) capsule Take 1 capsule by mouth 2 times daily   cyclobenzaprine (FLEXERIL) 10 MG tablet Take 1 tablet (10 mg) by mouth 3 times daily as needed for muscle spasms   gabapentin (NEURONTIN) 300 MG capsule Take 2 capsules (600 mg) by mouth At Bedtime   omeprazole (PRILOSEC) 20 MG capsule Take 1 capsule (20 mg) by mouth 2 times daily (before meals)       PHYSICAL EXAMINATION:  Temp:  [97.3  F (36.3  C)-99.1  F (37.3  C)] 99.1  F (37.3  C)  Heart Rate:  [63-93] 79  Resp:  [16-18] 16  BP: (110-130)/(48-68) 130/68  MAP:  [63 mmHg-96 mmHg] 67 mmHg  Arterial Line BP: (106-156)/(45-68) 112/48  FiO2 (%):  [60 %] 60 %  SpO2:  [97 %-100 %] 99 %    General: adult male  HEENT: ETT and OG in place  Neuro: sedated  CV: RRR  Pulm: CTAB, mechanically ventilated  Abd: soft, TABITHA with s/s drainage  Extremities: wwp  Skin: Jaundiced  Incisions: c/d/i with island dressings, minimal staining    LABS: Reviewed.   Arterial Blood Gases     Recent Labs  Lab 03/04/17 2037 03/04/17  1950 03/04/17 1825 03/04/17  1729   PH 7.32* 7.37 7.34* 7.25*   PCO2 43 38 40 42   PO2 175* 315* 353* 435*   HCO3 22 22 21 19*     Complete Blood Count     Recent Labs  Lab 03/04/17 2037 03/04/17  1950 03/04/17 1825 03/04/17  1729 03/04/17  1652 03/04/17  1602  03/04/17  0626 03/03/17  1458 03/03/17  0451   WBC 12.5*  --   --   --   --   --   --  4.7 3.9* 4.3   HGB 10.4* 10.0*  --  7.7* 7.9* 8.5*  < > 9.5* 9.2* 9.3*   *  --  113*  --  58* 57*  <  > 37* 34* 38*   < > = values in this interval not displayed.  Basic Metabolic Panel    Recent Labs  Lab 03/04/17 2037 03/04/17  1950 03/04/17 1825 03/04/17  1729 03/04/17  0626 03/03/17 1458 03/03/17  0451    137 136 136  < > 130* 131* 129*   POTASSIUM 3.8 3.6 3.7 3.5  < > 3.9 3.5 3.7   CHLORIDE 106  --   --   --   --  99 98 100   CO2 22  --   --   --   --  17* 17* 16*   BUN 88*  --   --   --   --  108* 99* 107*   CR 2.55*  --   --   --   --  2.82* 2.78* 2.75*   * 218* 221* 252*  < > 226* 371* 220*   < > = values in this interval not displayed.  Liver Function Tests    Recent Labs  Lab 03/04/17 2037 03/04/17  1950 03/04/17 1825 03/04/17 1652 03/04/17 0626 03/03/17 1458 03/03/17  0451   AST 1010*  --   --   --   --  130* 116* 122*   *  --   --   --   --  90* 72* 80*   ALKPHOS 114  --   --   --   --  222* 200* 210*   BILITOTAL 19.9*  --   --   --   --  37.2* 35.2* 37.7*   ALBUMIN 2.6*  --   --   --   --  3.1* 3.0* 3.0*   INR 1.94* 1.81* 2.19* 1.84*  < > 1.67* 1.82* 1.73*   < > = values in this interval not displayed.  Pancreatic Enzymes    Recent Labs  Lab 03/03/17  1458   AMYLASE 70     Coagulation Profile    Recent Labs  Lab 03/04/17 2037 03/04/17  1950 03/04/17 1825 03/04/17 1652 03/04/17  1602   INR 1.94* 1.81* 2.19* 1.84* 1.81*   PTT  --  42* 59* 47* 47*       IMAGING:  Recent Results (from the past 24 hour(s))   XR Chest Port 1 View    Narrative    Preliminary     Impression    Impression:  Endotracheal tube terminates 5 cm above the chacorta.    Full report to follow.

## 2017-03-04 NOTE — ANESTHESIA PROCEDURE NOTES
PA Catheter Insertion Note  Anesthesiologist: NAYELY VELASCO      Introducer: Introducer placed as part of procedure (SEE separate note)   Skin prep:  Chloraprep Cap, Full body drape, hand hygiene, Mask, Sterile gloves and Sterile gown    PA Catheter type:  CCO        Appropriate RA, RV, PA  waveforms?: Yes    Dressing:  Biopatch and Tegaderm    Complications:  None apparent

## 2017-03-04 NOTE — PROGRESS NOTES
3:38 PM  March 4, 2017  Preliminary/Final positive donor blood/urine/sputum culture results have been uploaded into [facility]. Donor ID [id number]. Rakan Tran and Tay notified of results.  Rebecca Mcguire RN, CCTC  On Call Organ Coordinator

## 2017-03-04 NOTE — PLAN OF CARE
Problem: Goal Outcome Summary  Goal: Goal Outcome Summary  Outcome: No Change  Vital signs:  Temp: 97.9  F (36.6  C) Temp src: Oral BP: 126/54   Heart Rate: 68 Resp: 18 SpO2: 97 % O2 Device: None (Room air)   Height: 182.9 cm (6') Weight: 98.3 kg (216 lb 11.4 oz)      Neuro: Intact. A&O X4. Afebrile.   Cardiac: Normal Sinus rhythm, HR sustains in 60's, stable BP.  Resp: Lungs sounds unlabored and clear. O2 Sats % on Room Air  GI/: Crowe output adequate. Strict I and O. BM x2 this shift.  Skin: Slight lower leg edema, Jaundice.  Access: PIV right and left arms  Activity:Up with assist of 1 due to slight unsteadiness.   Pain:Reported pain after rectal tube insertion but denies any pain at this time.     Last preop scrub completed. Plan for OR at 0800 today.     Will continue with plan of care and notify MD of any changes.

## 2017-03-04 NOTE — ANESTHESIA PREPROCEDURE EVALUATION
Anesthesia Evaluation     . Pt has had prior anesthetic. Type: General    No history of anesthetic complications     ROS/MED HX    ENT/Pulmonary:     (+)sleep apnea, uses CPAP , . .    Neurologic:     (+)CVA date: 2001     Cardiovascular:     (+) hypertension----. : . . . :. . Previous cardiac testing date:results:Stress Testdate:02/08/17 results:Interpretation Summary  Normal dobutamine stress echocardiogram without evidence of inducible  ischemia. Target heart rate was achieved. Heart rate and blood pressure  response to dobutamine were normal. With stress, the left ventricular ejection  fraction increased from 55-60% to greater than 65% and the left ventricular  size decreased appropriately.  No subjective symptoms to suggest ischemia.  There was no ECG evidence of ischemia.  No significant valve disease on screening doppler evaluation. The aortic root  and visualized ascending aorta are normal.  The right ventricular systolic pressure is borderline elevated and  approximated at 36mmHg plus the right atrial pressure, though the TR jet is  sub-optimal.ECG reviewed date:03/03/17 results:Sinus rhythm  Left axis deviation  Left ventricular hypertrophy with QRS widening  Abnormal ECG  When compared with ECG of 21-FEB-2017 17:39,  T wave amplitude has decreased in Anterior leadsCath date: 02/24/17 results:See scanned document          METS/Exercise Tolerance:     Hematologic: Comments: PLT 34        Musculoskeletal:   (+) arthritis (RA), , , -       GI/Hepatic:     (+) GERD liver disease (CASTAÑEDA HCC s/p TACE c/b HE, DVT),       Renal/Genitourinary:         Endo:     (+) type II DM .      Psychiatric:  - neg psychiatric ROS       Infectious Disease:  - neg infectious disease ROS       Malignancy:   (+) Malignancy (HCC) History of GI  GI CA Active status post,         Other:    - neg other ROS           Physical Exam  Normal systems: cardiovascular, pulmonary and dental    Airway   Mallampati: III  TM distance: >3 FB  Neck  ROM: full    Dental     Cardiovascular       Pulmonary                     Anesthesia Plan      History & Physical Review  History and physical reviewed and following examination; no interval change.    ASA Status:  4 .    NPO Status:  > 8 hours    Plan for General and ETT with Intravenous induction. Maintenance will be Balanced.      Additional equipment: 2nd IV, Arterial Line, Central Line and CVP      Postoperative Care  Postoperative pain management:  IV analgesics.      Consents  Anesthetic plan, risks, benefits and alternatives discussed with:  Patient..        Procedure: Procedure(s):  Liver Transplant  - Wound Class:    - Wound Class:     HPI:   Camacho Bhagat is a 51 year old male with history of CASTAÑEDA, HCC status post TACE, ESLD, DM II, DVT status post IVC filter, paroxysmal atrial fibrillation with resulting CVA in 2001, RONNIE with BiPAP, and RA. The patient has therapeutic paracentesis 2x per week. He has had multiple recent admissions; his most recent admissions include 2/14-2/16 when he was admitted for hyperkalemia and hyponatremia (K 6.3, Na 120, total bili 30.7), and discharged with dietary and fluid restrictions. He was readmitted 2/21 for hepatic encephalopathy with persistent hyperkalemia, hyponatremia, and bilirubinemia. He is listed for liver transplantation.    PMHx/PSHx:  Past Medical History   Diagnosis Date     Acute venous embolism and thrombosis of other specified veins 11/01     Deep vein thrombophlebitis, filter placed     Cancer (H)      Depressive disorder, not elsewhere classified      Esophageal reflux      Fibromyalgia 1/2009     dx with Dr Benitez( Rheum)     Osteoarthritis      Other and unspecified hyperlipidemia      Other chronic nonalcoholic liver disease      Fatty liver      Other testicular hypofunction      Pneumonia, organism unspecified 10-01     Included ARDS, sepsis, and  acute renal failure; hospitalized     Rheumatoid arthritis(714.0)      Type II or unspecified type  diabetes mellitus without mention of complication, not stated as uncontrolled 11/01     Managed by endocrinology     Unspecified essential hypertension 11/01     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     Unspecified sleep apnea      Uses BiPAP       Past Surgical History   Procedure Laterality Date     C nonspecific procedure       tracheostomy     C nonspecific procedure       repair of deviated septum     C nonspecific procedure  2007     Rt knee arthroscopy     C total knee arthroplasty  2008     Right knee arthroscopy     Cholecystectomy       Esophagoscopy, gastroscopy, duodenoscopy (egd), combined N/A 8/4/2016     Procedure: COMBINED ESOPHAGOSCOPY, GASTROSCOPY, DUODENOSCOPY (EGD), BIOPSY SINGLE OR MULTIPLE;  Surgeon: Trent Pederson MD;  Location:  GI       Social History   Substance Use Topics     Smoking status: Former Smoker     Smokeless tobacco: Former User     Types: Chew     Quit date: 10/8/2015      Comment: Has used chewing tobacco since age 15 , chewed for 20yrs      Alcohol use No      Comment: last drink about a year ago (quit 2001)          Allergies   Allergen Reactions     Erythromycin GI Disturbance     Vioxx      Nausea, vomiting       Prescriptions Prior to Admission   Medication Sig Dispense Refill Last Dose     LISINOPRIL PO Take by mouth daily        insulin glargine (LANTUS) 100 UNIT/ML injection Inject 65 Units Subcutaneous every morning 19.5 mL 0      insulin lispro (HUMALOG) 100 UNIT/ML injection Inject 20 Units Subcutaneous 3 times daily (before meals) 1 vial 0      furosemide (LASIX) 40 MG tablet Take 1 tablet (40 mg) by mouth daily 30 tablet 0      spironolactone (ALDACTONE) 50 MG tablet Take 1 tablet (50 mg) by mouth daily 90 tablet 3 Past Week at Unknown time     rifaximin (XIFAXAN) 550 MG TABS tablet Take 1 tablet (550 mg) by mouth 2 times daily 60 tablet 11 Past Week at Unknown time     ondansetron (ZOFRAN-ODT) 4 MG ODT tab Take 1 tablet (4 mg) by mouth every 8 hours  as needed for nausea 30 tablet 0 Taking     glucagon 1 MG SOLR injection Inject 1 mg Subcutaneous every 15 minutes as needed for low blood sugar (May repeat x 1 only) 1 each 0 Taking     lactobacillus rhamnosus, GG, (CULTURELL) capsule Take 1 capsule by mouth 2 times daily 60 capsule 0 2/14/2017 at x1     cyclobenzaprine (FLEXERIL) 10 MG tablet Take 1 tablet (10 mg) by mouth 3 times daily as needed for muscle spasms 30 tablet 0 Taking     gabapentin (NEURONTIN) 300 MG capsule Take 2 capsules (600 mg) by mouth At Bedtime 60 capsule 0 2/13/2017 at Unknown time     omeprazole (PRILOSEC) 20 MG capsule Take 1 capsule (20 mg) by mouth 2 times daily (before meals) 60 capsule 0 2/14/2017 at x1       No current outpatient prescriptions on file.       Objective:   /54 (BP Location: Right arm)  Pulse 65  Temp 36.6  C (97.9  F) (Oral)  Resp 18  Ht 1.829 m (6')  Wt 98.3 kg (216 lb 11.4 oz)  SpO2 97%  BMI 29.39 kg/m2  Lab Results   Component Value Date    WBC 3.9 (L) 03/03/2017    HGB 9.2 (L) 03/03/2017    HCT 26.5 (L) 03/03/2017    PLT 34 (LL) 03/03/2017     (L) 03/03/2017    POTASSIUM 3.5 03/03/2017    CHLORIDE 98 03/03/2017    CO2 17 (L) 03/03/2017    BUN 99 (H) 03/03/2017    CR 2.78 (H) 03/03/2017     (H) 03/03/2017    SED 45 (H) 11/23/2016    DD 0.6 (H) 02/28/2008    TROPONIN <0.04 01/10/2007    TROPI <0.012 10/30/2008    TROPR Negative 11/04/2006     (H) 03/03/2017    ALT 72 (H) 03/03/2017    ALKPHOS 200 (H) 03/03/2017    BILITOTAL 35.2 (HH) 03/03/2017    CLAUDIA  02/28/2017     Unsatisfactory specimen - icteric   Canceled, Test credited  NOTIFIED FLOWER WORTHY RN 6B 2100 2/28/2017 BY AA      INR 1.67 (H) 03/04/2017       Previous Intubation:  None available.    Assessment: Camacho Bhagat is a 51 year old male with history of HCC (current MELD score 40) who is scheduled for Procedure(s):  Liver Transplant  - Wound Class:    - Wound Class: .     Plan: To be discussed with staff.   - ASA 4  - GETA with  standard ASA monitors, IV induction, balanced anesthetic  - PIV large bore access x 2  - Arterial line  - MAC line x 2  - Blood products available including RBCs, FFP, PLTs, and Cryo.  - Antibiotics per surgery  - PONV prophylaxis    Mynor Tran,   [id number]    Procedure:  Procedure(s):  Liver Transplant    - Wound Class: N/A    Patient Active Problem List   Diagnosis     Diabetes mellitus, type 2 (H)     Carpal tunnel syndrome     Essential hypertension     Personal history of thrombophlebitis     Esophageal reflux     Depressive disorder, not elsewhere classified     Hyperlipidemia     Sleep apnea     Testicular hypofunction     TYPE 2 DIABETES, HBA1C GOAL < 7%     HYPERLIPIDEMIA LDL GOAL <100     Fibromyalgia     OA (osteoarthritis)     Cirrhosis (H)     Sicca syndrome (H)     Knee pain     Primary localized osteoarthrosis, lower leg     Cerebrovascular accident (H)     Diabetes mellitus with neurological manifestation (H)     Medication refill- do not delete      Pain medication agreement signed - Shay Kirkpatrick 2/7/14     Hepatocellular carcinoma (H)     Hepatic cirrhosis, unspecified hepatic cirrhosis type (H)     Preoperative cardiovascular examination     HCC (hepatocellular carcinoma) (H)     Edema     Cellulitis of scrotum     Uncontrolled type 2 diabetes mellitus with hyperglycemia, with long-term current use of insulin (H)     Debility     Type 2 diabetes mellitus without complication, with long-term current use of insulin (H)     Altered mental status, unspecified     Hepatic encephalopathy (H)     Urinary tract infection associated with catheterization of urinary tract (H)     Altered mental status     Hypoglycemia     Hepatic cirrhosis (H)     Osteoarthritis     Rheumatoid arthritis (H)     Hyperkalemia       Past Medical History   Diagnosis Date     Acute venous embolism and thrombosis of other specified veins 11/01     Deep vein thrombophlebitis, filter placed     Cancer (H)      Depressive disorder, not  elsewhere classified      Esophageal reflux      Fibromyalgia 1/2009     dx with Dr Benitez( Rheum)     Osteoarthritis      Other and unspecified hyperlipidemia      Other chronic nonalcoholic liver disease      Fatty liver      Other testicular hypofunction      Pneumonia, organism unspecified 10-01     Included ARDS, sepsis, and  acute renal failure; hospitalized     Rheumatoid arthritis(714.0)      Type II or unspecified type diabetes mellitus without mention of complication, not stated as uncontrolled 11/01     Managed by endocrinology     Unspecified essential hypertension 11/01     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     Unspecified sleep apnea      Uses BiPAP       Past Surgical History   Procedure Laterality Date     C nonspecific procedure       tracheostomy     C nonspecific procedure       repair of deviated septum     C nonspecific procedure  2007     Rt knee arthroscopy     C total knee arthroplasty  2008     Right knee arthroscopy     Cholecystectomy       Esophagoscopy, gastroscopy, duodenoscopy (egd), combined N/A 8/4/2016     Procedure: COMBINED ESOPHAGOSCOPY, GASTROSCOPY, DUODENOSCOPY (EGD), BIOPSY SINGLE OR MULTIPLE;  Surgeon: Trent Pederson MD;  Location:  GI         Current Facility-Administered Medications on File Prior to Encounter:  albumin human 25 % injection 12.5 g     Current Outpatient Prescriptions on File Prior to Encounter:  LISINOPRIL PO Take by mouth daily   insulin glargine (LANTUS) 100 UNIT/ML injection Inject 65 Units Subcutaneous every morning   insulin lispro (HUMALOG) 100 UNIT/ML injection Inject 20 Units Subcutaneous 3 times daily (before meals)   furosemide (LASIX) 40 MG tablet Take 1 tablet (40 mg) by mouth daily   spironolactone (ALDACTONE) 50 MG tablet Take 1 tablet (50 mg) by mouth daily   rifaximin (XIFAXAN) 550 MG TABS tablet Take 1 tablet (550 mg) by mouth 2 times daily   ondansetron (ZOFRAN-ODT) 4 MG ODT tab Take 1 tablet (4 mg) by mouth every 8 hours  as needed for nausea   glucagon 1 MG SOLR injection Inject 1 mg Subcutaneous every 15 minutes as needed for low blood sugar (May repeat x 1 only)   lactobacillus rhamnosus, GG, (CULTURELL) capsule Take 1 capsule by mouth 2 times daily   cyclobenzaprine (FLEXERIL) 10 MG tablet Take 1 tablet (10 mg) by mouth 3 times daily as needed for muscle spasms   gabapentin (NEURONTIN) 300 MG capsule Take 2 capsules (600 mg) by mouth At Bedtime   omeprazole (PRILOSEC) 20 MG capsule Take 1 capsule (20 mg) by mouth 2 times daily (before meals)       Allergies   Allergen Reactions     Erythromycin GI Disturbance     Vioxx      Nausea, vomiting       Recent Labs   Lab Test  03/04/17 0626   HGB  9.5*     Recent Labs   Lab Test  03/04/17 0626   POTASSIUM  3.9     Recent Labs   Lab Test  03/04/17 0626   PLT  37*     Recent Labs   Lab Test  03/04/17 0626   INR  1.67*       No results found for this or any previous visit (from the past 4320 hour(s)).      Attending Anesthesiologist    Jesse Hogan MD    *[phone]

## 2017-03-05 ENCOUNTER — APPOINTMENT (OUTPATIENT)
Dept: ULTRASOUND IMAGING | Facility: CLINIC | Age: 52
DRG: 005 | End: 2017-03-05
Attending: TRANSPLANT SURGERY
Payer: COMMERCIAL

## 2017-03-05 ENCOUNTER — DOCUMENTATION ONLY (OUTPATIENT)
Dept: TRANSPLANT | Facility: CLINIC | Age: 52
End: 2017-03-05

## 2017-03-05 ENCOUNTER — APPOINTMENT (OUTPATIENT)
Dept: CT IMAGING | Facility: CLINIC | Age: 52
DRG: 005 | End: 2017-03-05
Attending: NURSE PRACTITIONER
Payer: COMMERCIAL

## 2017-03-05 LAB
ALBUMIN SERPL-MCNC: 2.6 G/DL (ref 3.4–5)
ALP SERPL-CCNC: 72 U/L (ref 40–150)
ALT SERPL W P-5'-P-CCNC: 329 U/L (ref 0–70)
AMYLASE SERPL-CCNC: 53 U/L (ref 30–110)
ANION GAP SERPL CALCULATED.3IONS-SCNC: 11 MMOL/L (ref 3–14)
ANION GAP SERPL CALCULATED.3IONS-SCNC: 12 MMOL/L (ref 3–14)
ANION GAP SERPL CALCULATED.3IONS-SCNC: 13 MMOL/L (ref 3–14)
APTT PPP: 36 SEC (ref 22–37)
APTT PPP: 37 SEC (ref 22–37)
AST SERPL W P-5'-P-CCNC: 680 U/L (ref 0–45)
BASE DEFICIT BLDA-SCNC: 3.7 MMOL/L
BASE DEFICIT BLDA-SCNC: 3.8 MMOL/L
BASE DEFICIT BLDA-SCNC: 4 MMOL/L
BASOPHILS # BLD AUTO: 0 10E9/L (ref 0–0.2)
BASOPHILS NFR BLD AUTO: 0 %
BILIRUB DIRECT SERPL-MCNC: 10.7 MG/DL (ref 0–0.2)
BILIRUB SERPL-MCNC: 13.4 MG/DL (ref 0.2–1.3)
BLD PROD TYP BPU: NORMAL
BLD UNIT ID BPU: 0
BLOOD PRODUCT CODE: NORMAL
BPU ID: NORMAL
BUN SERPL-MCNC: 91 MG/DL (ref 7–30)
BUN SERPL-MCNC: 95 MG/DL (ref 7–30)
BUN SERPL-MCNC: 98 MG/DL (ref 7–30)
CA-I BLD-MCNC: 4.7 MG/DL (ref 4.4–5.2)
CALCIUM SERPL-MCNC: 7.2 MG/DL (ref 8.5–10.1)
CALCIUM SERPL-MCNC: 7.8 MG/DL (ref 8.5–10.1)
CALCIUM SERPL-MCNC: 8.1 MG/DL (ref 8.5–10.1)
CHLORIDE SERPL-SCNC: 105 MMOL/L (ref 94–109)
CHLORIDE SERPL-SCNC: 106 MMOL/L (ref 94–109)
CHLORIDE SERPL-SCNC: 108 MMOL/L (ref 94–109)
CO2 SERPL-SCNC: 20 MMOL/L (ref 20–32)
CO2 SERPL-SCNC: 21 MMOL/L (ref 20–32)
CO2 SERPL-SCNC: 22 MMOL/L (ref 20–32)
CREAT SERPL-MCNC: 2.71 MG/DL (ref 0.66–1.25)
CREAT SERPL-MCNC: 2.97 MG/DL (ref 0.66–1.25)
CREAT SERPL-MCNC: 2.98 MG/DL (ref 0.66–1.25)
CRP SERPL-MCNC: 20 MG/L (ref 0–8)
DIFFERENTIAL METHOD BLD: ABNORMAL
EOSINOPHIL # BLD AUTO: 0 10E9/L (ref 0–0.7)
EOSINOPHIL NFR BLD AUTO: 0 %
ERYTHROCYTE [DISTWIDTH] IN BLOOD BY AUTOMATED COUNT: 17.6 % (ref 10–15)
ERYTHROCYTE [DISTWIDTH] IN BLOOD BY AUTOMATED COUNT: 18.4 % (ref 10–15)
FIBRINOGEN PPP-MCNC: 199 MG/DL (ref 200–420)
FIBRINOGEN PPP-MCNC: 219 MG/DL (ref 200–420)
GFR SERPL CREATININE-BSD FRML MDRD: 22 ML/MIN/1.7M2
GFR SERPL CREATININE-BSD FRML MDRD: 22 ML/MIN/1.7M2
GFR SERPL CREATININE-BSD FRML MDRD: 25 ML/MIN/1.7M2
GLUCOSE SERPL-MCNC: 108 MG/DL (ref 70–99)
GLUCOSE SERPL-MCNC: 204 MG/DL (ref 70–99)
GLUCOSE SERPL-MCNC: 240 MG/DL (ref 70–99)
HCO3 BLD-SCNC: 21 MMOL/L (ref 21–28)
HCT VFR BLD AUTO: 22.5 % (ref 40–53)
HCT VFR BLD AUTO: 22.7 % (ref 40–53)
HGB BLD-MCNC: 7.6 G/DL (ref 13.3–17.7)
HGB BLD-MCNC: 8 G/DL (ref 13.3–17.7)
HGB BLD-MCNC: 8.1 G/DL (ref 13.3–17.7)
IMM GRANULOCYTES # BLD: 0 10E9/L (ref 0–0.4)
IMM GRANULOCYTES NFR BLD: 0 %
INR PPP: 1.45 (ref 0.86–1.14)
INR PPP: 1.62 (ref 0.86–1.14)
LACTATE BLD-SCNC: 0.5 MMOL/L (ref 0.7–2.1)
LACTATE BLD-SCNC: 0.8 MMOL/L (ref 0.7–2.1)
LIPASE SERPL-CCNC: 216 U/L (ref 73–393)
LYMPHOCYTES # BLD AUTO: 0 10E9/L (ref 0.8–5.3)
LYMPHOCYTES NFR BLD AUTO: 0.8 %
MAGNESIUM SERPL-MCNC: 2.2 MG/DL (ref 1.6–2.3)
MAGNESIUM SERPL-MCNC: 2.3 MG/DL (ref 1.6–2.3)
MCH RBC QN AUTO: 31.7 PG (ref 26.5–33)
MCH RBC QN AUTO: 32.4 PG (ref 26.5–33)
MCHC RBC AUTO-ENTMCNC: 35.6 G/DL (ref 31.5–36.5)
MCHC RBC AUTO-ENTMCNC: 35.7 G/DL (ref 31.5–36.5)
MCV RBC AUTO: 89 FL (ref 78–100)
MCV RBC AUTO: 91 FL (ref 78–100)
MONOCYTES # BLD AUTO: 0 10E9/L (ref 0–1.3)
MONOCYTES NFR BLD AUTO: 1.1 %
NEUTROPHILS # BLD AUTO: 2.6 10E9/L (ref 1.6–8.3)
NEUTROPHILS NFR BLD AUTO: 98.1 %
NRBC # BLD AUTO: 0.1 10*3/UL
NRBC BLD AUTO-RTO: 2 /100
NUM BPU REQUESTED: 1
NUM BPU REQUESTED: 5
O2/TOTAL GAS SETTING VFR VENT: 21 %
O2/TOTAL GAS SETTING VFR VENT: 21 %
O2/TOTAL GAS SETTING VFR VENT: 40 %
PCO2 BLD: 33 MM HG (ref 35–45)
PCO2 BLD: 34 MM HG (ref 35–45)
PCO2 BLD: 34 MM HG (ref 35–45)
PH BLD: 7.39 PH (ref 7.35–7.45)
PH BLD: 7.4 PH (ref 7.35–7.45)
PH BLD: 7.4 PH (ref 7.35–7.45)
PHOSPHATE SERPL-MCNC: 5.1 MG/DL (ref 2.5–4.5)
PHOSPHATE SERPL-MCNC: 5.3 MG/DL (ref 2.5–4.5)
PLATELET # BLD AUTO: 22 10E9/L (ref 150–450)
PLATELET # BLD AUTO: 29 10E9/L (ref 150–450)
PO2 BLD: 100 MM HG (ref 80–105)
PO2 BLD: 172 MM HG (ref 80–105)
PO2 BLD: 82 MM HG (ref 80–105)
POTASSIUM SERPL-SCNC: 3.6 MMOL/L (ref 3.4–5.3)
POTASSIUM SERPL-SCNC: 3.7 MMOL/L (ref 3.4–5.3)
POTASSIUM SERPL-SCNC: 3.9 MMOL/L (ref 3.4–5.3)
PROT SERPL-MCNC: 4.1 G/DL (ref 6.8–8.8)
RBC # BLD AUTO: 2.5 10E12/L (ref 4.4–5.9)
RBC # BLD AUTO: 2.52 10E12/L (ref 4.4–5.9)
SODIUM SERPL-SCNC: 139 MMOL/L (ref 133–144)
SODIUM SERPL-SCNC: 140 MMOL/L (ref 133–144)
SODIUM SERPL-SCNC: 140 MMOL/L (ref 133–144)
TRANSFUSION STATUS PATIENT QL: NORMAL
WBC # BLD AUTO: 2.6 10E9/L (ref 4–11)
WBC # BLD AUTO: 3.1 10E9/L (ref 4–11)

## 2017-03-05 PROCEDURE — 94003 VENT MGMT INPAT SUBQ DAY: CPT

## 2017-03-05 PROCEDURE — 25000132 ZZH RX MED GY IP 250 OP 250 PS 637: Performed by: SURGERY

## 2017-03-05 PROCEDURE — 82803 BLOOD GASES ANY COMBINATION: CPT | Performed by: TRANSPLANT SURGERY

## 2017-03-05 PROCEDURE — 40000275 ZZH STATISTIC RCP TIME EA 10 MIN

## 2017-03-05 PROCEDURE — 99291 CRITICAL CARE FIRST HOUR: CPT | Mod: GC | Performed by: SURGERY

## 2017-03-05 PROCEDURE — P9037 PLATE PHERES LEUKOREDU IRRAD: HCPCS | Performed by: STUDENT IN AN ORGANIZED HEALTH CARE EDUCATION/TRAINING PROGRAM

## 2017-03-05 PROCEDURE — 40000014 ZZH STATISTIC ARTERIAL MONITORING DAILY

## 2017-03-05 PROCEDURE — 85018 HEMOGLOBIN: CPT | Performed by: TRANSPLANT SURGERY

## 2017-03-05 PROCEDURE — 20000004 ZZH R&B ICU UMMC

## 2017-03-05 PROCEDURE — 83735 ASSAY OF MAGNESIUM: CPT | Performed by: TRANSPLANT SURGERY

## 2017-03-05 PROCEDURE — 25000128 H RX IP 250 OP 636: Performed by: NURSE PRACTITIONER

## 2017-03-05 PROCEDURE — 85384 FIBRINOGEN ACTIVITY: CPT | Performed by: TRANSPLANT SURGERY

## 2017-03-05 PROCEDURE — 74174 CTA ABD&PLVS W/CONTRAST: CPT

## 2017-03-05 PROCEDURE — 25000125 ZZHC RX 250: Performed by: TRANSPLANT SURGERY

## 2017-03-05 PROCEDURE — 80048 BASIC METABOLIC PNL TOTAL CA: CPT | Performed by: TRANSPLANT SURGERY

## 2017-03-05 PROCEDURE — 00000146 ZZHCL STATISTIC GLUCOSE BY METER IP

## 2017-03-05 PROCEDURE — 82330 ASSAY OF CALCIUM: CPT | Performed by: TRANSPLANT SURGERY

## 2017-03-05 PROCEDURE — P9012 CRYOPRECIPITATE EACH UNIT: HCPCS | Performed by: STUDENT IN AN ORGANIZED HEALTH CARE EDUCATION/TRAINING PROGRAM

## 2017-03-05 PROCEDURE — 85027 COMPLETE CBC AUTOMATED: CPT | Performed by: STUDENT IN AN ORGANIZED HEALTH CARE EDUCATION/TRAINING PROGRAM

## 2017-03-05 PROCEDURE — 25000125 ZZHC RX 250: Performed by: NURSE PRACTITIONER

## 2017-03-05 PROCEDURE — 40000344 ZZHCL STATISTIC THAWING COMPONENT: Performed by: STUDENT IN AN ORGANIZED HEALTH CARE EDUCATION/TRAINING PROGRAM

## 2017-03-05 PROCEDURE — 85610 PROTHROMBIN TIME: CPT | Performed by: TRANSPLANT SURGERY

## 2017-03-05 PROCEDURE — 40000048 ZZH STATISTIC DAILY SWAN MONITORING

## 2017-03-05 PROCEDURE — 76700 US EXAM ABDOM COMPLETE: CPT | Mod: XS

## 2017-03-05 PROCEDURE — 25000128 H RX IP 250 OP 636: Performed by: TRANSPLANT SURGERY

## 2017-03-05 PROCEDURE — 27210913 ZZH NUTRITION PRODUCT INTERMEDIATE PACKET

## 2017-03-05 PROCEDURE — 84100 ASSAY OF PHOSPHORUS: CPT | Performed by: TRANSPLANT SURGERY

## 2017-03-05 PROCEDURE — 40000281 ZZH STATISTIC TRANSPORT TIME EA 15 MIN

## 2017-03-05 PROCEDURE — P9016 RBC LEUKOCYTES REDUCED: HCPCS | Performed by: TRANSPLANT SURGERY

## 2017-03-05 PROCEDURE — 25000132 ZZH RX MED GY IP 250 OP 250 PS 637: Performed by: INTERNAL MEDICINE

## 2017-03-05 PROCEDURE — 25000131 ZZH RX MED GY IP 250 OP 636 PS 637: Performed by: TRANSPLANT SURGERY

## 2017-03-05 PROCEDURE — 40000196 ZZH STATISTIC RAPCV CVP MONITORING

## 2017-03-05 PROCEDURE — 25000132 ZZH RX MED GY IP 250 OP 250 PS 637: Performed by: TRANSPLANT SURGERY

## 2017-03-05 PROCEDURE — 27210995 ZZH RX 272: Performed by: STUDENT IN AN ORGANIZED HEALTH CARE EDUCATION/TRAINING PROGRAM

## 2017-03-05 PROCEDURE — 85730 THROMBOPLASTIN TIME PARTIAL: CPT | Performed by: TRANSPLANT SURGERY

## 2017-03-05 PROCEDURE — 83605 ASSAY OF LACTIC ACID: CPT | Performed by: TRANSPLANT SURGERY

## 2017-03-05 PROCEDURE — 93975 VASCULAR STUDY: CPT | Mod: 77,TC

## 2017-03-05 PROCEDURE — 27210437 ZZH NUTRITION PRODUCT SEMIELEM INTERMED LITER

## 2017-03-05 PROCEDURE — 25500064 ZZH RX 255 OP 636: Performed by: TRANSPLANT SURGERY

## 2017-03-05 RX ORDER — SODIUM CHLORIDE, SODIUM LACTATE, POTASSIUM CHLORIDE, CALCIUM CHLORIDE 600; 310; 30; 20 MG/100ML; MG/100ML; MG/100ML; MG/100ML
INJECTION, SOLUTION INTRAVENOUS
Status: DISCONTINUED
Start: 2017-03-05 | End: 2017-03-06 | Stop reason: HOSPADM

## 2017-03-05 RX ORDER — ASPIRIN 325 MG
325 TABLET ORAL DAILY
Status: DISCONTINUED | OUTPATIENT
Start: 2017-03-05 | End: 2017-03-10 | Stop reason: HOSPADM

## 2017-03-05 RX ORDER — MANNITOL 250 MG/ML
25 INJECTION, SOLUTION INTRAVENOUS ONCE
Status: COMPLETED | OUTPATIENT
Start: 2017-03-05 | End: 2017-03-05

## 2017-03-05 RX ORDER — HYDROMORPHONE HYDROCHLORIDE 1 MG/ML
.3-.5 INJECTION, SOLUTION INTRAMUSCULAR; INTRAVENOUS; SUBCUTANEOUS
Status: DISCONTINUED | OUTPATIENT
Start: 2017-03-05 | End: 2017-03-08 | Stop reason: DRUGHIGH

## 2017-03-05 RX ORDER — AMINO ACIDS/PROTEIN HYDROLYS 15G-100/30
1 LIQUID (ML) ORAL 2 TIMES DAILY
Status: DISCONTINUED | OUTPATIENT
Start: 2017-03-05 | End: 2017-03-07 | Stop reason: ALTCHOICE

## 2017-03-05 RX ORDER — SODIUM CHLORIDE, SODIUM LACTATE, POTASSIUM CHLORIDE, CALCIUM CHLORIDE 600; 310; 30; 20 MG/100ML; MG/100ML; MG/100ML; MG/100ML
INJECTION, SOLUTION INTRAVENOUS
Status: DISCONTINUED
Start: 2017-03-05 | End: 2017-03-05 | Stop reason: HOSPADM

## 2017-03-05 RX ORDER — IOPAMIDOL 755 MG/ML
100 INJECTION, SOLUTION INTRAVASCULAR ONCE
Status: COMPLETED | OUTPATIENT
Start: 2017-03-05 | End: 2017-03-05

## 2017-03-05 RX ORDER — FUROSEMIDE 10 MG/ML
20 INJECTION INTRAMUSCULAR; INTRAVENOUS
Status: DISCONTINUED | OUTPATIENT
Start: 2017-03-05 | End: 2017-03-05

## 2017-03-05 RX ORDER — SODIUM CHLORIDE 450 MG/100ML
INJECTION, SOLUTION INTRAVENOUS CONTINUOUS
Status: DISCONTINUED | OUTPATIENT
Start: 2017-03-05 | End: 2017-03-06

## 2017-03-05 RX ADMIN — HUMAN INSULIN 8 UNITS/HR: 100 INJECTION, SOLUTION SUBCUTANEOUS at 06:05

## 2017-03-05 RX ADMIN — HUMAN INSULIN: 100 INJECTION, SOLUTION SUBCUTANEOUS at 11:50

## 2017-03-05 RX ADMIN — LIDOCAINE 1 PATCH: 50 PATCH TOPICAL at 08:05

## 2017-03-05 RX ADMIN — SODIUM CHLORIDE: 4.5 INJECTION, SOLUTION INTRAVENOUS at 04:36

## 2017-03-05 RX ADMIN — PIPERACILLIN AND TAZOBACTAM 2.25 G: 2; .25 INJECTION, POWDER, LYOPHILIZED, FOR SOLUTION INTRAVENOUS; PARENTERAL at 11:51

## 2017-03-05 RX ADMIN — ASPIRIN 325 MG ORAL TABLET 325 MG: 325 PILL ORAL at 13:15

## 2017-03-05 RX ADMIN — FENTANYL CITRATE 50 MCG/HR: 50 INJECTION, SOLUTION INTRAMUSCULAR; INTRAVENOUS at 17:04

## 2017-03-05 RX ADMIN — MYCOPHENOLATE MOFETIL 1000 MG: 250 CAPSULE ORAL at 17:36

## 2017-03-05 RX ADMIN — SODIUM CHLORIDE 200 MG: 9 INJECTION, SOLUTION INTRAVENOUS at 08:04

## 2017-03-05 RX ADMIN — FUROSEMIDE 20 MG: 10 INJECTION, SOLUTION INTRAVENOUS at 11:50

## 2017-03-05 RX ADMIN — PROPOFOL 35 MCG/KG/MIN: 10 INJECTION, EMULSION INTRAVENOUS at 06:48

## 2017-03-05 RX ADMIN — PROPOFOL 40 MCG/KG/MIN: 10 INJECTION, EMULSION INTRAVENOUS at 02:56

## 2017-03-05 RX ADMIN — HUMAN INSULIN 7 UNITS/HR: 100 INJECTION, SOLUTION SUBCUTANEOUS at 21:02

## 2017-03-05 RX ADMIN — PIPERACILLIN AND TAZOBACTAM 2.25 G: 2; .25 INJECTION, POWDER, LYOPHILIZED, FOR SOLUTION INTRAVENOUS; PARENTERAL at 06:09

## 2017-03-05 RX ADMIN — SODIUM BICARBONATE 650 MG TABLET 1300 MG: at 08:04

## 2017-03-05 RX ADMIN — ACETYLCYSTEINE 600 MG: 200 SOLUTION ORAL; RESPIRATORY (INHALATION) at 22:11

## 2017-03-05 RX ADMIN — MICONAZOLE NITRATE: 2 POWDER TOPICAL at 20:08

## 2017-03-05 RX ADMIN — ACETYLCYSTEINE 600 MG: 200 SOLUTION ORAL; RESPIRATORY (INHALATION) at 17:36

## 2017-03-05 RX ADMIN — HUMAN INSULIN 10 UNITS/HR: 100 INJECTION, SOLUTION SUBCUTANEOUS at 03:05

## 2017-03-05 RX ADMIN — MICAFUNGIN SODIUM 150 MG: 10 INJECTION, POWDER, LYOPHILIZED, FOR SOLUTION INTRAVENOUS at 15:08

## 2017-03-05 RX ADMIN — MANNITOL 25 G: 12.5 INJECTION, SOLUTION INTRAVENOUS at 16:59

## 2017-03-05 RX ADMIN — MANNITOL 25 G: 12.5 INJECTION, SOLUTION INTRAVENOUS at 17:46

## 2017-03-05 RX ADMIN — PIPERACILLIN AND TAZOBACTAM 2.25 G: 2; .25 INJECTION, POWDER, LYOPHILIZED, FOR SOLUTION INTRAVENOUS; PARENTERAL at 17:36

## 2017-03-05 RX ADMIN — SODIUM BICARBONATE 650 MG TABLET 1300 MG: at 20:05

## 2017-03-05 RX ADMIN — SULFAMETHOXAZOLE AND TRIMETHOPRIM 1 TABLET: 400; 80 TABLET ORAL at 08:04

## 2017-03-05 RX ADMIN — PIPERACILLIN AND TAZOBACTAM 2.25 G: 2; .25 INJECTION, POWDER, LYOPHILIZED, FOR SOLUTION INTRAVENOUS; PARENTERAL at 23:59

## 2017-03-05 RX ADMIN — Medication 1 MG: at 17:37

## 2017-03-05 RX ADMIN — IOPAMIDOL 100 ML: 755 INJECTION, SOLUTION INTRAVENOUS at 15:52

## 2017-03-05 RX ADMIN — MICONAZOLE NITRATE: 2 POWDER TOPICAL at 08:04

## 2017-03-05 RX ADMIN — MYCOPHENOLATE MOFETIL 1000 MG: 200 POWDER, FOR SUSPENSION ORAL at 08:04

## 2017-03-05 RX ADMIN — SODIUM CHLORIDE 20 MG: 9 INJECTION, SOLUTION INTRAVENOUS at 09:18

## 2017-03-05 RX ADMIN — SODIUM BICARBONATE: 84 INJECTION, SOLUTION INTRAVENOUS at 15:27

## 2017-03-05 RX ADMIN — MICONAZOLE NITRATE: 20 CREAM TOPICAL at 20:00

## 2017-03-05 ASSESSMENT — PAIN DESCRIPTION - DESCRIPTORS: DESCRIPTORS: DISCOMFORT

## 2017-03-05 NOTE — ANESTHESIA POSTPROCEDURE EVALUATION
Patient: Camacho Bhagat    Procedure(s):  Liver Transplant    - Wound Class: N/A    Diagnosis:Cryptogenic Cirrhosis  Diagnosis Additional Information: No value filed.    Anesthesia Type:  General, ETT, RSI    Note:  Anesthesia Post Evaluation    Patient participation: Unable to participate in evaluation secondary to underlying medical condition  Post-procedure mental status: sedated   Pain management: unable to assess  Airway patency: intubated   Cardiovascular status: acceptable  Respiratory status: ETT  PONV: unable to assess       Comments: The patient is transferred from OR to ICU.         Last vitals:  Vitals:    03/04/17 2330 03/04/17 2345 03/05/17 0000   BP:      Pulse:      Resp:   20   Temp:   37.5  C (99.5  F)   SpO2: 99% 100%          Electronically Signed By: Connor Colon MD  March 5, 2017  12:03 AM

## 2017-03-05 NOTE — PROGRESS NOTES
Immunosuppression Note:    Camacho Bhagat is a 51 year old male who is seen today  for immunosuppression management     I, Jovan Tran MD, I have examined the patient with the resident/PA/Fellow, discussed and agree with the note and findings.  I have reviewed today's vital signs, medications, labs and imaging. I reviewed the immunosuppression medications and levels. I spoke to the patient/family and explained below clinical details and answered all the questions      Transplant Surgery  Inpatient Daily Progress Note  03/05/2017    Assessment & Plan: 51M cirrhosis CASTAÑEDA POD#1 OLT    Graft function:good, improved. US to eval HA.  Immunosuppression management: Changed 1mg BID Tac .  Complexity of management:High. Contributing factors: organ dysfunction and new transplant  Hematology: stable  Cardiorespiratory: Improved extubated   GI/Nutrition: start feeds via NDT   Endocrine: no issues  Fluid/Electrolytes: MIVF:Decreased .  : Crowe to remain due to acute illness  Infectious disease: periop antibiotics  Prophylaxis: DVT, fall, GI, fungal  Disposition: SICU     Medical Decision Making: High  Admit 35539 (high level decision making)    PILLO/Fellow/Resident Provider: Adonay Cross    Faculty: Jovan Tran M.D.  _________________________________________________________________  Transplant History: Admitted 2/21/2017 for OLT.  3/4/2017 (Liver), Postoperative day: 1     Interval History: History is obtained from the patient  Overnight events: extubated    ROS:   A 10-point review of systems was negative except as noted above.    Meds:    multivitamins with minerals  15 mL Per Feeding Tube Daily     protein modular  1 packet Per Feeding Tube BID     omeprazole  20 mg Oral or Feeding Tube Daily     sodium chloride (PF)  3 mL Intravenous Q8H     [START ON 3/6/2017] methylPREDNISolone  100 mg Intravenous Once    Followed by     [START ON 3/7/2017] methylPREDNISolone  50 mg Intravenous Once    Followed by      [START ON 3/8/2017] predniSONE  25 mg Oral Once    Followed by     [START ON 3/9/2017] predniSONE  10 mg Oral Once     valGANciclovir  450 mg Oral Every Other Day    Or     valGANciclovir  450 mg Oral or NG Tube Every Other Day     sulfamethoxazole-trimethoprim  1 tablet Oral Daily     piperacillin-tazobactam  2.25 g Intravenous Q6H     mycophenolate  1,000 mg Oral BID IS    Or     mycophenolate  1,000 mg Oral or NG Tube BID IS     [START ON 3/8/2017] basiliximab (SIMULECT) infusion  20 mg Intravenous Once     micafungin  150 mg Intravenous Q24H     sodium bicarbonate  1,300 mg Oral BID     miconazole with skin protectant   Topical BID     miconazole   Topical BID     Topical skin adhesive   Topical Once     gadobutrol  0.1 mL/kg Intravenous Once       Physical Exam:     Admit Weight: 103.9 kg (229 lb) (bed scale)    Current vitals:   BP 91/49  Pulse 65  Temp 99  F (37.2  C) (Pulmonary Artery)  Resp 10  Ht 1.829 m (6')  Wt 96.6 kg (212 lb 15.4 oz)  SpO2 (P) 100%  BMI 28.88 kg/m2    CVP (mmHg): 6 mmHg    Vital sign ranges:    Temp:  [97.5  F (36.4  C)-99.5  F (37.5  C)] 99  F (37.2  C)  Heart Rate:  [63-95] (P) 70  Resp:  [10-20] 10  BP: ()/(48-68) 91/49  MAP:  [52 mmHg-291 mmHg] 63 mmHg  Arterial Line BP: ()/() 119/46  FiO2 (%):  [35 %-60 %] 35 %  SpO2:  [95 %-100 %] (P) 100 %  Patient Vitals for the past 24 hrs:   BP Temp Temp src Heart Rate Resp SpO2 Weight   03/05/17 0800 - 99  F (37.2  C) Pulm Art - 10 - -   03/05/17 0757 - - - (P) 70 - - -   03/05/17 0754 - - - - - (P) 100 % -   03/05/17 0645 - - - 65 - 100 % -   03/05/17 0630 - - - 75 - 100 % -   03/05/17 0615 - - - 66 - 100 % -   03/05/17 0600 - 99.3  F (37.4  C) Pulm Art 67 18 100 % -   03/05/17 0545 - - - 67 - 100 % -   03/05/17 0530 - 99.3  F (37.4  C) Pulm Art 66 19 100 % -   03/05/17 0515 - - - 66 - 100 % -   03/05/17 0500 - - - 70 17 100 % -   03/05/17 0455 - 99.1  F (37.3  C) Pulm Art 70 18 95 % -   03/05/17 0445 - 99.1  F (37.3   C) Pulm Art 71 18 95 % -   03/05/17 0430 - - - 73 - 95 % -   03/05/17 0400 - 99.1  F (37.3  C) Pulm Art 70 19 100 % -   03/05/17 0330 - - - 67 - 100 % -   03/05/17 0315 - - - 67 - 100 % -   03/05/17 0300 - 99  F (37.2  C) Pulm Art 67 16 99 % -   03/05/17 0245 - - - 67 - 100 % -   03/05/17 0230 - - - 66 - 100 % -   03/05/17 0215 - - - 67 - 100 % -   03/05/17 0200 - 98.8  F (37.1  C) Pulm Art 66 19 100 % -   03/05/17 0145 - - - 66 - 100 % -   03/05/17 0130 - - - 67 - 100 % -   03/05/17 0115 91/49 - - 67 - 100 % -   03/05/17 0100 - 98.8  F (37.1  C) Pulm Art 67 18 100 % 96.6 kg (212 lb 15.4 oz)   03/05/17 0045 - - - 69 - 100 % -   03/05/17 0030 - - - 76 - 100 % -   03/05/17 0000 - 99.5  F (37.5  C) Pulm Art 78 20 100 % -   03/04/17 2345 - - - 95 - 100 % -   03/04/17 2330 - - - 79 - 99 % -   03/04/17 2315 - - - 90 - 99 % -   03/04/17 2309 - - - 91 - 99 % -   03/04/17 2300 - 99.1  F (37.3  C) Pulm Art 93 16 100 % -   03/04/17 2245 - - - 90 - 100 % -   03/04/17 2230 - - - 71 - 100 % -   03/04/17 2215 - 99  F (37.2  C) Pulm Art 72 16 100 % -   03/04/17 2200 - - - 84 - 100 % -   03/04/17 2145 - - - 74 - 100 % -   03/04/17 2130 - - - 80 - 100 % -   03/04/17 2115 - - - 75 - 100 % -   03/04/17 2100 - 98.8  F (37.1  C) Pulm Art 75 16 100 % -   03/04/17 2045 130/68 - - 75 16 100 % -   03/04/17 1100 115/48 97.5  F (36.4  C) Oral 63 18 100 % -     General Appearance: in no apparent distress.   Skin: normal, jaundice  Heart: regular rate and rhythm  Lungs: clear to auscultation  Abdomen: The abdomen is flat, and  non-tender, . he is not tender over the liver graft. The wound is Healing well.  : vazquez is present.  The genitalia is edematous. Urine has no gross hematuria.   Extremities: edema: present bilaterally. 1+  Neurologic: awake. Tremor absent..     Data:   CMP  Recent Labs  Lab 03/05/17  0358 03/04/17 2037 03/03/17  1458    142  < > 131*   POTASSIUM 3.9 3.8  < > 3.5   CHLORIDE 106 106  < > 98   CO2 20 22  < > 17*    * 205*  < > 371*   BUN 91* 88*  < > 99*   CR 2.71* 2.55*  < > 2.78*   GFRESTIMATED 25* 27*  < > 24*   GFRESTBLACK 30* 32*  < > 29*   JACOB 8.1* 7.8*  < > 8.5   ICAW 4.7 4.9  < >  --    MAG 2.2  --   --  2.7*   PHOS 5.3*  --   --  4.6*   AMYLASE 53  --   --  70   LIPASE 216  --   --   --    ALBUMIN 2.6* 2.6*  < > 3.0*   BILITOTAL 13.4* 19.9*  < > 35.2*   ALKPHOS 72 114  < > 200*   * 1010*  < > 116*   * 497*  < > 72*   < > = values in this interval not displayed.  CBC  Recent Labs  Lab 03/05/17 0358 03/04/17 2037   HGB 8.0* 10.4*   WBC 2.6* 12.5*   PLT 29* 100*     COAGS  Recent Labs  Lab 03/05/17 0358 03/04/17 2037 03/04/17  1950   INR 1.62* 1.94* 1.81*   PTT 37  --  42*      Urinalysis  Recent Labs   Lab Test  03/03/17   1405  03/01/17   0340   04/11/16   1345  02/08/16   1234   COLOR  Dark Yellow  Dark Yellow   < >  Yellow  Yellow   APPEARANCE  Slightly Cloudy  Slightly Cloudy   < >  Clear  Clear   URINEGLC  Negative  Negative   < >  30*  >1000*   URINEBILI  Moderate   This is an unconfirmed screening test result. A positive result may be false.  *  Moderate   This is an unconfirmed screening test result. A positive result may be false.  *   < >  Negative  Negative   URINEKETONE  Negative  Negative   < >  Negative  Negative   SG  1.010  1.011   < >  1.016  1.020   UBLD  Negative  Negative   < >  Small*  Small*   URINEPH  5.0  5.0   < >  5.0  5.0   PROTEIN  Negative  Negative   < >  30*  30*   NITRITE  Negative  Negative   < >  Negative  Negative   LEUKEST  Negative  Negative   < >  Negative  Negative   RBCU  1  <1   < >  1  2   WBCU  0  3*   < >  1  1   UTPG   --    --    --   0.41*  0.33*    < > = values in this interval not displayed.     Virology:  Hepatitis C Antibody   Date Value Ref Range Status   02/08/2016  NR Final    Nonreactive   Assay performance characteristics have not been established for newborns,   infants, and children

## 2017-03-05 NOTE — PROGRESS NOTES
9:11 AM  2017  Patient removed from the [facility] waitlist after  donor liver transplant. OS ID is [id number].  Rebecca Mcguire RN, CCTC  On Call Organ Coordinator

## 2017-03-05 NOTE — PROGRESS NOTES
[facility], [place]     Extubation Procedure Note     Patient extubated at: March 5, 2017, 10:00 AM   Supplemental Oxygen: Via nasal cannula at 2 liters per minute   Cough: The cough is productive   Secretion Mode: Able to clear   Secretion Amount: Small amount, moderately thick and yellow in color   Respiratory Exam:: Breath sounds: good aeration     Location: bilaterally   Skin Exam:: Patient color: jaundiced   Patient Status: Currently appears comfortable   Arterial Blood Gasses: pH Arterial (pH)   Date Value   03/05/2017 7.40     pO2 Arterial (mm Hg)   Date Value   03/05/2017 172 (H)     pCO2 Arterial (mm Hg)   Date Value   03/05/2017 34 (L)     Bicarbonate Arterial (mmol/L)   Date Value   03/05/2017 21            Recorded by Philippe Khalil

## 2017-03-05 NOTE — PLAN OF CARE
Problem: Goal Outcome Summary  Goal: Goal Outcome Summary  Outcome: Improving  D/I: Patient arrived after DDOLT direct to ICU ventilated and sedated.  His EBL was 3 liters per anesthesiologist's report.        Patient received, 5 units PRBCs, 4 units Platelets, 6 units FFP, 2 liters of crystalloid, 500 cc albumin, 4 units of       Cryoprecipitate.  His initial CVP was 8 and it did drift down overnight to 4 this morning.  His total TABITHA output so far       Is around 700 cc.  One bolus of 5% albumin 25 gms given for volume replacement.         Since platelets fell from 100 to 29, one unit was given at 0500.  HGB 8.0.  Urine output has been about 50 cc/hr        Since volume given.        Patient remains on small dose of Norepi gtt to keep MAPs 60-65. Patient on fentanyl and propofol for sedation/       Pain control.   When propofol decreased, Camacho becomes agitated and restless, but he does move all extremities,       Without following commands.    A: Stable with volume resuscitation.    P: Labs q 6 hours to follow transplant's parameters for blood product replacement. Pressure support this morning       And wean to extubate as able.

## 2017-03-05 NOTE — ANESTHESIA CARE TRANSFER NOTE
Patient: Camacho Bhagat    Procedure(s):  Liver Transplant    - Wound Class: N/A    Diagnosis: Cryptogenic Cirrhosis  Diagnosis Additional Information: No value filed.    Anesthesia Type:   General, ETT, RSI     Note:  Airway :ETT  Patient transferred to:ICU  Comments: VS stable, ETT in place, vent: TV-500, R-18, PEEP 6, Report RN      Vitals: (Last set prior to Anesthesia Care Transfer)    CRNA VITALS  3/4/2017 1949 - 3/4/2017 2029      3/4/2017             Pulse: 74    ART BP: 115/67                Electronically Signed By: Malena Green  March 4, 2017  8:29 PM

## 2017-03-05 NOTE — PROGRESS NOTES
CLINICAL NUTRITION SERVICES - BRIEF NOTE    Nutrition Prescription    RECOMMENDATIONS FOR MDs/PROVIDERS TO ORDER:  -Closely monitor electrolytes and replace as needed  -Free water flush adjustment per MD discretion    Recommendations already ordered by Registered Dietitian (RD):  -If K/Mg are WNL and Phos >1.9, start TF via OGT: Peptamen 1.5 @ 15 mL/hr  -If lytes remain WNL, adv TF by 10 mL q8h to goal 65 ml/hr (1560 ml/day) to provide 2340 kcals, 106 g PRO, 1201 ml free H2O, 87 g Fat (70% from MCTs), 293 g CHO and no Fiber daily.  -1 packet Prostat BID to provide an additional 200 kcal and 30 g PRO  -TF @ goal + Prostat packets provide total provisions of: 2540 kcals (30 kcal/kg), 136 g PRO (1.6 g/kg), 1201 ml free H2O, 87 g Fat (70% from MCTs), 293 g CHO and no Fiber daily per dosing weight 86 kg.  -15 mL liquid MVI for micronutrient needs  -30 mL water flush q4h for tube patency  -Assess gastric residuals and HOB >30 degrees while receiving gastric feeds    Future/Additional Recommendations:  -EN start, adv, lytes  -Gastric feed tolerance  -Monitor renal labs for need to switch TF formula to Nepro     Received consult for Registered Dietitian to Assess and Order TF per MNT protocol    Nutrition Progress Note - f/u for progress towards previous nutrition POC (see previous 3/2 reassessment for details)    Rosaura Oseguera RDN, LD  Pgr: [id number]

## 2017-03-05 NOTE — SIGNIFICANT EVENT
Convened with Radiologist, Attdg Surgeon, Attdg Intensivist and we agree that the benefits of having a CTA to evaluate flow through the hepatic artery in the setting of a cr of 2.8 and a new liver transplant outweigh the risks of ongoing acute kidney injury.

## 2017-03-05 NOTE — PROGRESS NOTES
Nephrology Progress Note  March 5, 2017      Camacho Bhagat MRN:[id number] YOB: 1965  Date of Admission:2/21/2017  Primary care provider: Shay Kirkpatrick  Requesting physician: Jovan Tran MD    ASSESSMENT AND RECOMMENDATIONS:   51 year old male with non-RRT requiring EJ/CKD on a background of end-stage renal disease thought due to CASTAÑEDA. Also, type 2 DM, history pf hepatocellular carcinoma. Received liver transplant yesterday.    1. Kidney function very similar to just before transplant, with creatinine 2.7 today; urine output good at 70 mL/hour.  2. K 3.9, TCO2 20; no additional treatment recommended.  3. Continue daily weights and BMP.  4. No need for dialysis/CRRT at this time.      Raffi Crowe MD   [phone]      REASON FOR CONSULT: EJ/CKD in setting of advanced liver disease    HISTORY OF PRESENT ILLNESS:  Camacho Bhagat is a 51 year old male with EJ/CKD and advanced liver disease;     INTERVAL HISTORY: received liver transplant yesterday.    PAST MEDICAL HISTORY:  Reviewed with patient on 03/05/2017     Past Medical History   Diagnosis Date     Acute venous embolism and thrombosis of other specified veins 11/01     Deep vein thrombophlebitis, filter placed     Cancer (H)      Depressive disorder, not elsewhere classified      Esophageal reflux      Fibromyalgia 1/2009     dx with Dr Benitez( Rhe[facility])     Osteoarthritis      Other and unspecified hyperlipidemia      Other chronic nonalcoholic liver disease      Fatty liver      Other testicular hypofunction      Pneumonia, organism unspecified 10-01     Included ARDS, sepsis, and  acute renal failure; hospitalized     Rheumatoid arthritis(714.0)      Type II or unspecified type diabetes mellitus without mention of complication, not stated as uncontrolled 11/01     Managed by endocrinology     Unspecified essential hypertension 11/01     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     Unspecified sleep apnea      Uses BiPAP        Past Surgical History   Procedure Laterality Date     C nonspecific procedure       tracheostomy     C nonspecific procedure       repair of deviated septum     C nonspecific procedure  2007     Rt knee arthroscopy     C total knee arthroplasty  2008     Right knee arthroscopy     Cholecystectomy       Esophagoscopy, gastroscopy, duodenoscopy (egd), combined N/A 8/4/2016     Procedure: COMBINED ESOPHAGOSCOPY, GASTROSCOPY, DUODENOSCOPY (EGD), BIOPSY SINGLE OR MULTIPLE;  Surgeon: Trent Pederson MD;  Location:  GI        MEDICATIONS:  PTA Meds  Prior to Admission medications    Medication Sig Last Dose Taking? Auth Provider   LISINOPRIL PO Take by mouth daily   Reported, Patient   insulin glargine (LANTUS) 100 UNIT/ML injection Inject 65 Units Subcutaneous every morning   Serjio Singleton MD   insulin lispro (HUMALOG) 100 UNIT/ML injection Inject 20 Units Subcutaneous 3 times daily (before meals)   Serjio Singleton MD   furosemide (LASIX) 40 MG tablet Take 1 tablet (40 mg) by mouth daily   Serjio Singleton MD   spironolactone (ALDACTONE) 50 MG tablet Take 1 tablet (50 mg) by mouth daily   Toñito Camejo MD   rifaximin (XIFAXAN) 550 MG TABS tablet Take 1 tablet (550 mg) by mouth 2 times daily   Toñito Camejo MD   ondansetron (ZOFRAN-ODT) 4 MG ODT tab Take 1 tablet (4 mg) by mouth every 8 hours as needed for nausea   Godwin Willson MD   glucagon 1 MG SOLR injection Inject 1 mg Subcutaneous every 15 minutes as needed for low blood sugar (May repeat x 1 only)   Godwin Willson MD   lactobacillus rhamnosus, GG, (CULTURELL) capsule Take 1 capsule by mouth 2 times daily   Eagle Tejada MD   cyclobenzaprine (FLEXERIL) 10 MG tablet Take 1 tablet (10 mg) by mouth 3 times daily as needed for muscle spasms   Eagle Tejada MD   gabapentin (NEURONTIN) 300 MG capsule Take 2 capsules (600 mg) by mouth At Bedtime   Eagle Tejada MD   omeprazole (PRILOSEC) 20 MG capsule Take 1  capsule (20 mg) by mouth 2 times daily (before meals)   Eagle Tejada MD      Current Meds    multivitamins with minerals  15 mL Per Feeding Tube Daily     protein modular  1 packet Per Feeding Tube BID     omeprazole  20 mg Oral or Feeding Tube Daily     sodium chloride (PF)  3 mL Intravenous Q8H     [START ON 3/6/2017] methylPREDNISolone  100 mg Intravenous Once    Followed by     [START ON 3/7/2017] methylPREDNISolone  50 mg Intravenous Once    Followed by     [START ON 3/8/2017] predniSONE  25 mg Oral Once    Followed by     [START ON 3/9/2017] predniSONE  10 mg Oral Once     valGANciclovir  450 mg Oral Every Other Day    Or     valGANciclovir  450 mg Oral or NG Tube Every Other Day     sulfamethoxazole-trimethoprim  1 tablet Oral Daily     piperacillin-tazobactam  2.25 g Intravenous Q6H     mycophenolate  1,000 mg Oral BID IS    Or     mycophenolate  1,000 mg Oral or NG Tube BID IS     [START ON 3/8/2017] basiliximab (SIMULECT) infusion  20 mg Intravenous Once     micafungin  150 mg Intravenous Q24H     sodium bicarbonate  1,300 mg Oral BID     miconazole with skin protectant   Topical BID     miconazole   Topical BID     Topical skin adhesive   Topical Once     gadobutrol  0.1 mL/kg Intravenous Once     Infusion Meds    NaCl 90 mL/hr at 03/05/17 0436     IV fluid REPLACEMENT ONLY       Reason beta blocker order not selected       IV fluid REPLACEMENT ONLY 10 mL/hr at 03/05/17 0100     fentaNYL 100 mcg/hr (03/05/17 0800)     insulin (regular) 6 mL/hr at 03/05/17 0800     norepinephrine Stopped (03/05/17 0800)     - MEDICATION INSTRUCTIONS -         ALLERGIES:    Allergies   Allergen Reactions     Erythromycin GI Disturbance     Vioxx      Nausea, vomiting       REVIEW OF SYSTEMS:  A 4 point review of systems was not performed as patient intubated and sedated.    SOCIAL HISTORY:   Social History     Social History     Marital status:      Spouse name: N/A     Number of children: 1     Years of  education: N/A     Occupational History            Social History Main Topics     Smoking status: Former Smoker     Smokeless tobacco: Former User     Types: Chew     Quit date: 10/8/2015      Comment: Has used chewing tobacco since age 15 , chewed for 20yrs      Alcohol use No      Comment: last drink about a year ago (quit )     Drug use: No     Sexual activity: Yes     Partners: Female     Other Topics Concern     Not on file     Social History Narrative    uSED TO BE      Back in school now       FAMILY MEDICAL HISTORY:   Family History   Problem Relation Age of Onset     DIABETES Father      Hypertension Father      Substance Abuse Father      Arthritis Mother      CANCER Mother      Thyroid     Thyroid Disease Mother      Other Cancer Mother      Colon Cancer No family hx of      Hyperlipidemia No family hx of      Coronary Artery Disease No family hx of      CEREBROVASCULAR DISEASE No family hx of      Breast Cancer No family hx of      Prostate Cancer No family hx of          PHYSICAL EXAM:   Temp  Av.8  F (36.6  C)  Min: 95.7  F (35.4  C)  Max: 99.5  F (37.5  C)  Arterial Line MAP (mmHg)  Av.7 mmHg  Min: 52 mmHg  Max: 291 mmHg  Arterial Line BP  Min: 92/38  Max: 291/291      Pulse  Av.4  Min: 6  Max: 106 Resp  Av.6  Min: 10  Max: 23  SpO2  Av.8 %  Min: 95 %  Max: 100 %  FiO2 (%)  Av.9 %  Min: 21 %  Max: 60 %    CVP (mmHg): 6 mmHg  BP 91/49  Pulse 65  Temp 99  F (37.2  C) (Pulmonary Artery)  Resp 10  Ht 1.829 m (6')  Wt 96.6 kg (212 lb 15.4 oz)  SpO2 (P) 100%  BMI 28.88 kg/m2   Date 17 0700 - 17 0659   Shift 9589-8108 2893-6353 3527-7705 24 Hour Total   I  N  T  A  K  E   I.V. 335.37   335.37    NG/GT 30   30    Shift Total  (mL/kg) 365.37  (3.78)   365.37  (3.78)   O  U  T  P  U  T   Urine 45   45    Drains 350   350    Shift Total  (mL/kg) 395  (4.09)   395  (4.09)   Weight (kg) 96.6 96.6 96.6 96.6        Admit Weight:  103.9 kg (229 lb) (bed scale)     GENERAL APPEARANCE: not distressed, intubated, responsive.  EYES: scleral icterus, pupils equal  HENT: NC/AT,  mouth  without ulcers or lesions  Lymphatics: no cervical or supraclavicular LAD  Pulmonary: lungs clear to auscultation with equal breath sounds bilaterally, no clubbing  CV: regular rhythm, normal rate, no rub   - JVP normal   - Edema absent  GI: soft, nontender, normal bowel sounds, no HSM   MS: no evidence of inflammation in joints, no muscle tenderness  : Crowe  SKIN: no rash, warm, dry, no cyanosis  LABS:   CMP  Recent Labs  Lab 03/05/17  0358 03/04/17  2037 03/04/17  1950 03/04/17  1825  03/04/17  0626 03/03/17  1458  02/27/17  1635    142 137 136  < > 130* 131*  < > 123*   POTASSIUM 3.9 3.8 3.6 3.7  < > 3.9 3.5  < > 4.2   CHLORIDE 106 106  --   --   --  99 98  < > 92*   CO2 20 22  --   --   --  17* 17*  < > 17*   ANIONGAP 13 14  --   --   --  14 17*  < > 14   * 205* 218* 221*  < > 226* 371*  < > 354*   BUN 91* 88*  --   --   --  108* 99*  < > 76*   CR 2.71* 2.55*  --   --   --  2.82* 2.78*  < > 2.29*   GFRESTIMATED 25* 27*  --   --   --  24* 24*  < > 30*   GFRESTBLACK 30* 32*  --   --   --  29* 29*  < > 36*   JACOB 8.1* 7.8*  --   --   --  9.1 8.5  < > 8.5   MAG 2.2  --   --   --   --   --  2.7*  --  2.5*   PHOS 5.3*  --   --   --   --   --  4.6*  --   --    PROTTOTAL 4.1* 4.3*  --   --   --  4.8* 4.7*  < >  --    ALBUMIN 2.6* 2.6*  --   --   --  3.1* 3.0*  < >  --    BILITOTAL 13.4* 19.9*  --   --   --  37.2* 35.2*  < >  --    ALKPHOS 72 114  --   --   --  222* 200*  < >  --    * 1010*  --   --   --  130* 116*  < >  --    * 497*  --   --   --  90* 72*  < >  --    < > = values in this interval not displayed.  CBC  Recent Labs  Lab 03/05/17  0358 03/04/17  2037 03/04/17  1950 03/04/17  1825 03/04/17  1729 03/04/17  1652  03/04/17  0626 03/03/17  1458   HGB 8.0* 10.4* 10.0*  --  7.7* 7.9*  < > 9.5* 9.2*   WBC 2.6* 12.5*  --   --   --   --    --  4.7 3.9*   RBC 2.52* 3.18*  --   --   --   --   --  2.80* 2.72*   HCT 22.5* 28.8*  --   --   --   --   --  26.9* 26.5*   MCV 89 91  --   --   --   --   --  96 97   MCH 31.7 32.7  --   --   --   --   --  33.9* 33.8*   MCHC 35.6 36.1  --   --   --   --   --  35.3 34.7   RDW 18.4* 17.0*  --   --   --   --   --  15.4* 15.4*   PLT 29* 100*  --  113*  --  58*  < > 37* 34*   < > = values in this interval not displayed.  INR  Recent Labs  Lab 03/05/17 0358 03/04/17 2037 03/04/17  1950 03/04/17  1825 03/04/17  1652   INR 1.62* 1.94* 1.81* 2.19* 1.84*   PTT 37  --  42* 59* 47*     ABG  Recent Labs  Lab 03/05/17 0358 03/04/17 2037 03/04/17  1950 03/04/17  1825   PH 7.40 7.32* 7.37 7.34*   PCO2 34* 43 38 40   PO2 172* 175* 315* 353*   HCO3 21 22 22 21   O2PER 40.0 60.0  60.0 97 96.0      URINE STUDIES  Recent Labs   Lab Test  03/03/17   1405  03/01/17   0340  02/27/17   1310  02/22/17   0034   COLOR  Dark Yellow  Dark Yellow  Dark Brown  Dark Yellow   APPEARANCE  Slightly Cloudy  Slightly Cloudy  Slightly Cloudy  Slightly Cloudy   URINEGLC  Negative  Negative  300*  >1000*   URINEBILI  Moderate   This is an unconfirmed screening test result. A positive result may be false.  *  Moderate   This is an unconfirmed screening test result. A positive result may be false.  *  Large   This is an unconfirmed screening test result. A positive result may be false.  *  Moderate   This is an unconfirmed screening test result. A positive result may be false.  *   URINEKETONE  Negative  Negative  Negative  Negative   SG  1.010  1.011  1.010  1.010   UBLD  Negative  Negative  Negative  Negative   URINEPH  5.0  5.0  5.0  5.5   PROTEIN  Negative  Negative  Negative  Negative   NITRITE  Negative  Negative  Negative  Negative   LEUKEST  Negative  Negative  Negative  Negative   RBCU  1  <1  2  1   WBCU  0  3*  5*  5*     Recent Labs   Lab Test  04/11/16   1345  02/08/16   1234  04/11/11   1201   [facility]  0.41*  0.33*  <0.08     PTH  No lab  results found.  IRON STUDIES  Recent Labs   Lab Test  02/08/16   1144   IRON  93   FEB  230*   IRONSAT  41       IMAGING:  All imaging studies reviewed by me.     Raffi Crowe MD

## 2017-03-05 NOTE — OP NOTE
Transplant Center   Operative Note     Procedure date:  17    Preoperative diagnosis:  End Stage Liver Disease due to nonalcoholic steatopheatitis, MELD 38    Postoperative diagnosis:  Same,     Procedure:  1 liver transplant   2. End-to-end Choledochocholedochostomy over a stent   3. Adhesion lysis > 60 min   4. Portal Venovenous bypass   * donor liver has a simple cyst in the right lobe superior aspect    Surgeon:  Surgeon(s) and Role:     * Jovan Tran MD - Primary     * Adonay Cross MD - Assisting         Fellow/assistant: There was no qualified resident to assist with this procedure    Anesthesia:  General    Specimen:  explant liver and donor gb    Drains:  TABITHA     Urine output:   Few  mls    Estimated blood loss:  1000cc    Fluids administered:       Intraoperative Events:  There were significant adhesion of the colon to the GB bed of the liver, severe portal hypertension and coagulopathy    Complications: None    Findings:  Very severe portal hypertension an coagulopathy. Large portal vein, and significant ascites . There was  A 5 cm rounded exophytic mass on the left lobe of the liver     Indication: Camacho Bhagat with a history of End Stage Liver Disease due to nonalcoholic steatopheatitis who presents for  - Brain Death Whole Liver transplant. A suitable donor offer has become available. After discussing the risks and benefits of proceeding, the patient agreed to proceed with surgery and provided informed consent.    Final ABO/Crossmatch verification: After the donor organ arrived to the operating room and prior to anastomosis, I participated in the transplant pre-verification upon organ receipt timeout by visually verifying the donor ID, organ and laterality, donor blood type, recipient unique identifier, recipient blood type, and that the donor and recipient are blood type compatible.     Donor UNOS ID:  [id number]    Donor ABO:  O    Donor arterial clamp on:  3/4/2017  11:14 AM    Preservation fluid:  UW     Vessels with organ:  NO    Donor organ arrival to recipient room:  3/4/2017  4:03 PM    Total ischemic time:  371    Cold ischemic time:  336    Warm ischemic time:  35    Ex-vivo:  NO    Time placed on Ex-vivo perfusion:  NO    Total time on Ex-vivo perfusion:  0 min     Back Table Preparation:   Procedure:  Bench preparation of the liver allograft for transplantation    Preoperative diagnosis:  End Stage Liver Disease due to nonalcoholic steatopheatitis    Postoperative diagnosis:  Same,     Surgeon:  Surgeon(s) and Role:     * Jovan Tran MD - Primary     * Adonay Cross MD - Assisting     * Melissa Rubalcava MD - Assisting    Co-Surgeon:  Jovan Tran M.D.    Fellow/Assistant:   There was no qualified resident to assist with this procedure    Anesthesia:  None    Graft biopsy:  Yes-   < 5% MACRO STEATOSIS    Macroscopic steatosis:  Mild    Back table reconstruction:  NO    Intimal flap repair:  NO    # of hepatic arteries:  1    # of portal veins:  1    Accessory arteries:  NO    # of hepatic veins:  3    # of bile ducts:  1    Graft weight:  NOT DONE     Findings:   Liver Laceration: No  Overall quality of liver: GOOD    Back Table Procedure: The liver allograft was received and inspected and the aforementioned findings were noted. It had been previously flushed with UW. The donor liver was placed in fresh ice-cold preservation solution. We identified the inferior vena cava. Two stay sutures were placed on the supra-hepatic portion of the cava. Two stay sutures were placed on the infra-hepatic portion of the cava. The fibro-fatty tissue and adrenal gland was cleared of inferior vena cava. The phrenic vein was ligated. The adrenal vein was ligated. The IVC was tested for leaks by using a bulb syringe. We suture ligated all identified leaks. The portal vein was identified. All the fibro-fatty area or tissue around the portal vein was removed  and the portal vein was dissected up to its bifurcation. An 8-Moldovan cannula was placed in the portal vein and fixed with a stitch. The portal vein was tested for leaks. We suture ligated all identified leaks. The cannula was left in place to be used for flushing the liver at the time of implantation. The hepatic artery anatomy was identified. The celiac axis  was traced all the way from the aortic patch to the level of the gastro-duodenal artery. Dissection was stopped at the level of the gastro-duodenal artery. All the leaks in the hepatic artery tributaries were suture ligated. The bile duct was inspected and flushed. No reconstruction was required. The liver was placed back in ice-cold preservation solution until ready for transplantation. Faculty was present for the critical portions of the procedure.    Findings:    Operative Procedure:   Arterial anastomosis start:  3/4/2017  4:50 PM    Recipient arterial unclamp:  3/4/2017  5:25 PM    Extra vessels used:  NO    Extra vessels banked:  NO    Previous upper abd surgery:  Yes    Previous cholecystectomy?  Yes    Portal vein:  Thrombus: No   Patent: Yes-     Specify:     On portal bypass:  Yes-      Arterial flow:  Sufficient: Yes-      Bile duct anastomosis:  To bowel: No   Specify:    To duct: Yes-   OVER A STENT SINGLE J   Specify: INTERRRUPTED SUTURES     Camacho TERESA Bhagat was brought to the operating room, placed in a supine position, and a time out was performed. Sequential compression devices were placed on both lower extremities and general endotracheal anesthesia was induced. The patient was given IV antibiotics, and Basiliximab. A Crowe catheter was placed. A central line was placed by Anesthesia service. The abdomen was then shaved, prepped, and draped in the usual sterile fashion.  The backtable preparation occurred prior to implantation.    We entered the abdominal cavity using  MERCEDES  incision. The abdomen was examined. Lysis of adhesions took  an  additional 60  minutes of operating time. We proceeded to mobilization of the liver first by dividing falciform ligament, next dividing the triangular ligaments and mobilizing the left lobe with further evaluation of the right lobe of the liver. Next the right lobe of the liver was mobilized. We elected to proceed with a hilar dissection. The right and left hepatic arteries were identified, ligated and divided, followed by ligation and division of the common bile duct. The portal vein was cleared from tissue and small branches were tied off and divided. The left and right portal veins were separately ligated and the portal vein was divided. At this point we went on the bypass with bypass flow of 1 liter per minute. Next, we continued mobilizing the right lobe. The adhesions between the right lobe and the right diaphragm were divided and the lobe was mobilized up to the vena cava. The hepatic veins were identified. Next, we dissected out the caudate lobe and infrahepatic IVC.     We applied Infrahepatic and suprahepatic clamps to the IVC and the liver was excised with curved Ontiveros scissors. The recipient's liver was passed off from the operating table. Next, complete hemostasis was obtained. The donor liver was brought into the field. The suprahepatic IVC anastomosis was constructed with 3-0 Prolene followed by the construction of infrahepatic anastomosis with 4-0 Prolene. Next, we came off the bypass and end-to-end portal anastomosis was constructed between the donor and recipient portal veins using 6-0 Prolene. During the construction of the infrahepatic IVC anastomosis, the liver was flushed with 5% albumin. Once the portal anastomosis was constructed, the liver was reperfused. Complete hemostasis was obtained and arterial anastomosis was performed between the donor celiac trunk patch using [place] technique and the bifurcation of the right and left recipient hepatic arteries. After clamps were released, there was a  good flow within the donor artery. Again, all the arterial branches that were bleeding were ligated and complete hemostasis was obtained. After the anastomosis was performed, good flow was re-established, hemostasis was obtained. Next, the biliary anastomosis was performed using a 6-0 interrupted PDS suture over the stent.     Next again the abdomen was irrigated and hemostasis was obtained. We placed thrombin soaked GEL FOAM in the retroperitonum.  We closed the abdomen with # 1 PDS. All needle, sponge and instrument counts were correct x 2. I was present for the entire  procedure. The patient was awakened, extubated, and transferred to the ICU for post-op monitoring.

## 2017-03-05 NOTE — PHARMACY-TRANSPLANT NOTE
Adult Liver Transplant Post Operative Note    51 year old male  s/p  donor liver transplant on 3/4/17 for nonalcoholic steatopheatitis.      Planned immunosuppression regimen to include Simulect on POD #1 and POD #4 and a course of prednisone/methylprednisolone.      Maintenance immunosuppression to include mycophenolate and tacrolimus.  Goal initial tacrolimus levels are 10-15 mcg/L.      Opportunistic pathogen prophylaxis includes: trimethoprim/sulfamethoxazole, valganciclovir, micafungin  Patient is not enrolled in medication study.    Patient with planned immunosuppression and prophylaxis as above.  Pharmacy will monitor for medication interactions and immunosuppression levels in conjunction with the team. Medication therapy needs for discharge planning will continue to be addressed throughout the current admission via multidisciplinary rounds and order review.  Pharmacy will make recommendations as appropriate.

## 2017-03-05 NOTE — PLAN OF CARE
Problem: Restraint for Non-Violent/Non-Self-Destructive Behavior  Goal: Prevent/Manage Potential Problems  Maintain safety of patient and others during period of restraint.  Promote psychological and physical wellbeing.  Prevent injury to skin and involved body parts.  Promote nutrition, hydration, and elimination.   Outcome: Therapy, progress towards functional goals is fair  Patient is ventilated and his Propofol was decreased slightly to check his mentation.  Camacho became agitated  And restless, pulling on lines/tubes, wiggling in bed, but unable to follow commands.  He would not open his eyes     Or focus on anything, but  Was very agitated.  MD aware and soft wrist restraints were begun.  The plan    Is to start him on pressure support to wean the vent near 0600, but with decreased sedation he becomes   agitated so restraints were added to assist him through this vent weaning process.

## 2017-03-05 NOTE — PROGRESS NOTES
SURGICAL ICU PROGRESS NOTE  03/05/2017    CO-MORBIDITIES:   Hepatic encephalopathy (H)  Biliary cirrhosis (H)  Acute renal failure, unspecified acute renal failure type (H)  Altered mental status, unspecified altered mental status type    ASSESSMENT: 52 y/o male with insulin dependent DM, HTN, cirrhosis of the liver due to CASTAÑEDA complicated by HCC and admitted to the hospital on 2/21/17 for hepatic encephalopathy. He is now s/p DDOLT on 3/4/17.      Today's plan:   - Removal swan if ok w/ transplant  - Wean vent,extubation     PLAN:  Neuro/ pain/ sedation:  History of hepatic encephalopathy, depressive d/o  - Monitor neurological status. Notify the MD for any acute changes in exam.  - Propofol gtt for sedation  - Fentanyl gtt for pain  - Hold PTA gabapentin, flexeril.     Pulmonary care:   - Wean to extubate this morning     Cardiovascular:   History of HTN  - Monitor hemodynamic status.   - Keep SBP > 110  - Vasopressor of choice if needed: levophed  - Hold PTA lisinopril, furosemide, spironolactone  - Start     GI care: History of CASTAÑEDA, HCC, GERD; S/P DDOLT on 3/4/2017   - NPO  - IV PPI  - Transplant to handle immunosuppressive agents.  - Place NJ for nutrition tomorrow     Fluids/ Electrolytes/ Nutrition:   - IVF resuscitation  - ICU electrolyte replacement protocol  - No indication for parenteral nutrition.  - NJ placement tomorrow and start nutrition    --- Nutrition consult. Appreciate recs    Renal/ Fluid Balance:   - Will monitor intake and output.  - Crowe in place for strict I&O  - Diuresis today, lasix BID     Endocrine:  History of insulin-dependent type II DM  - insulin gtt  - Hold PTA insulin glargine/lispro     ID/ Antibiotics:  - Pre operative antibiotics: Zosyn ends tomorrow  - Induction immunosupression by transplant      Heme:   - Keep hemoglobin >8  - Keep INR <2  - Keep fibrinogen > 200  - Keep Platelets >50     Prophylaxis:    - Mechanical prophylaxis for DVT.   - No chemical DVT  prophylaxis due to high risk of bleeding.      MSK: History of osteoarthritis, fibromyalgia   - PT and OT consulted. Appreciate recs.     Lines/ tubes/ drains:  - ETT, OG, PIV, taylor, TABITHA     Disposition:  - Surgical ICU.      Patient's findings and plan discussed with ICU staff, Dr. Coello    ====================================    TODAY'S PROGRESS:   SUBJECTIVE:   Transfused platelets and cryo for goals.   Good UOP.  Minimal pressors. Levophed 0.02.     OBJECTIVE:   1. VITAL SIGNS:   Temp:  [97.5  F (36.4  C)-99.5  F (37.5  C)] 99  F (37.2  C)  Heart Rate:  [63-95] 65  Resp:  [10-20] 10  BP: ()/(48-68) 91/49  MAP:  [52 mmHg-291 mmHg] 63 mmHg  Arterial Line BP: ()/() 119/46  FiO2 (%):  [35 %-60 %] 35 %  SpO2:  [95 %-100 %] 100 %  Ventilation Mode: CMV/AC  FiO2 (%): 35 %  Rate Set (breaths/minute): 16 breaths/min  Tidal Volume Set (mL): 550 mL  PEEP (cm H2O): 5 cmH2O  Oxygen Concentration (%): 40 %  Resp: 10    2. INTAKE/ OUTPUT:   I/O last 3 completed shifts:  In: 9637.5 [I.V.:3645.5; Other:824; NG/GT:80]  Out: 5520 [Urine:1220; Emesis/NG output:375; Drains:925; Blood:3000]    3. PHYSICAL EXAMINATION:   General: calm in bed  Neuro: following commands off of propofol  Resp: Breathing non-labored  CV: RRR  Abdomen: Soft, Non-distended, appropriately tender  Incisions: dressing dry  Extremities: warm and well perfused    4. INVESTIGATIONS:   Arterial Blood Gases     Recent Labs  Lab 03/05/17  0358 03/04/17  2037 03/04/17  1950 03/04/17  1825   PH 7.40 7.32* 7.37 7.34*   PCO2 34* 43 38 40   PO2 172* 175* 315* 353*   HCO3 21 22 22 21     Complete Blood Count     Recent Labs  Lab 03/05/17  0358 03/04/17  2037 03/04/17  1950 03/04/17  1825 03/04/17  1729 03/04/17  1652  03/04/17  0626 03/03/17  1458   WBC 2.6* 12.5*  --   --   --   --   --  4.7 3.9*   HGB 8.0* 10.4* 10.0*  --  7.7* 7.9*  < > 9.5* 9.2*   PLT 29* 100*  --  113*  --  58*  < > 37* 34*   < > = values in this interval not displayed.  Basic  Metabolic Panel    Recent Labs  Lab 03/05/17 0358 03/04/17 2037 03/04/17  1950 03/04/17 1825 03/04/17  0626 03/03/17  1458    142 137 136  < > 130* 131*   POTASSIUM 3.9 3.8 3.6 3.7  < > 3.9 3.5   CHLORIDE 106 106  --   --   --  99 98   CO2 20 22  --   --   --  17* 17*   BUN 91* 88*  --   --   --  108* 99*   CR 2.71* 2.55*  --   --   --  2.82* 2.78*   * 205* 218* 221*  < > 226* 371*   < > = values in this interval not displayed.  Liver Function Tests    Recent Labs  Lab 03/05/17 0358 03/04/17 2037 03/04/17  1950 03/04/17 1825 03/04/17 0626 03/03/17  1458   * 1010*  --   --   --  130* 116*   * 497*  --   --   --  90* 72*   ALKPHOS 72 114  --   --   --  222* 200*   BILITOTAL 13.4* 19.9*  --   --   --  37.2* 35.2*   ALBUMIN 2.6* 2.6*  --   --   --  3.1* 3.0*   INR 1.62* 1.94* 1.81* 2.19*  < > 1.67* 1.82*   < > = values in this interval not displayed.  Pancreatic Enzymes    Recent Labs  Lab 03/05/17 0358 03/03/17  1458   LIPASE 216  --    AMYLASE 53 70     Coagulation Profile    Recent Labs  Lab 03/05/17 0358 03/04/17 2037 03/04/17  1950 03/04/17 1825 03/04/17  1652   INR 1.62* 1.94* 1.81* 2.19* 1.84*   PTT 37  --  42* 59* 47*         5. RADIOLOGY:   Recent Results (from the past 24 hour(s))   XR Chest Port 1 View    Narrative    EXAM: XR CHEST PORT 1 VW  3/4/2017 10:21 PM      HISTORY: Check ET tube placement - Liver transplant    COMPARISON: 3/3/2017    FINDINGS: Endotracheal tube terminates 5 cm above the chacorta. Gastric  tube passes below the diaphragm. Right IJ Jenkins-Chari catheter projects  over the pulmonary outflow tract. Heart size is stable. Patchy  bibasilar opacities. Consolidation in the left base. Small left  pleural effusion. No pneumothorax.      Impression    IMPRESSION:   1. New right IJ Jenkins-Chari catheter tip projects over the pulmonary  outflow tract.  2. Endotracheal tube terminates 5 cm above the chacorta.  3. Increased left base consolidative changes, which may  represent  atelectasis versus infection.  4. Small left pleural effusion, stable.         I have personally reviewed the examination and initial interpretation  and I agree with the findings.    BASILIO SAGASTUME MD       =========================================

## 2017-03-06 ENCOUNTER — APPOINTMENT (OUTPATIENT)
Dept: GENERAL RADIOLOGY | Facility: CLINIC | Age: 52
DRG: 005 | End: 2017-03-06
Payer: COMMERCIAL

## 2017-03-06 ENCOUNTER — APPOINTMENT (OUTPATIENT)
Dept: PHYSICAL THERAPY | Facility: CLINIC | Age: 52
DRG: 005 | End: 2017-03-06
Payer: COMMERCIAL

## 2017-03-06 ENCOUNTER — APPOINTMENT (OUTPATIENT)
Dept: OCCUPATIONAL THERAPY | Facility: CLINIC | Age: 52
DRG: 005 | End: 2017-03-06
Attending: TRANSPLANT SURGERY
Payer: COMMERCIAL

## 2017-03-06 ENCOUNTER — DOCUMENTATION ONLY (OUTPATIENT)
Dept: TRANSPLANT | Facility: CLINIC | Age: 52
End: 2017-03-06

## 2017-03-06 ENCOUNTER — APPOINTMENT (OUTPATIENT)
Dept: SPEECH THERAPY | Facility: CLINIC | Age: 52
DRG: 005 | End: 2017-03-06
Payer: COMMERCIAL

## 2017-03-06 LAB
ALBUMIN SERPL-MCNC: 2.4 G/DL (ref 3.4–5)
ALP SERPL-CCNC: 69 U/L (ref 40–150)
ALT SERPL W P-5'-P-CCNC: 209 U/L (ref 0–70)
ANION GAP SERPL CALCULATED.3IONS-SCNC: 15 MMOL/L (ref 3–14)
ANISOCYTOSIS BLD QL SMEAR: SLIGHT
APTT PPP: 27 SEC (ref 22–37)
APTT PPP: 31 SEC (ref 22–37)
AST SERPL W P-5'-P-CCNC: 157 U/L (ref 0–45)
BASE DEFICIT BLDA-SCNC: 3.7 MMOL/L
BASOPHILS # BLD AUTO: 0 10E9/L (ref 0–0.2)
BASOPHILS NFR BLD AUTO: 0 %
BILIRUB DIRECT SERPL-MCNC: 4.3 MG/DL (ref 0–0.2)
BILIRUB SERPL-MCNC: 5.2 MG/DL (ref 0.2–1.3)
BLD PROD TYP BPU: NORMAL
BLD PROD TYP BPU: NORMAL
BLD UNIT ID BPU: 0
BLOOD PRODUCT CODE: NORMAL
BPU ID: NORMAL
BUN SERPL-MCNC: 103 MG/DL (ref 7–30)
BURR CELLS BLD QL SMEAR: SLIGHT
CA-I BLD-MCNC: 4.4 MG/DL (ref 4.4–5.2)
CALCIUM SERPL-MCNC: 7.6 MG/DL (ref 8.5–10.1)
CHLORIDE SERPL-SCNC: 102 MMOL/L (ref 94–109)
CMV IGG SERPL QL IA: 0.3 AI (ref 0–0.8)
CMV IGM SERPL QL IA: NORMAL AI (ref 0–0.8)
CO2 SERPL-SCNC: 22 MMOL/L (ref 20–32)
CREAT SERPL-MCNC: 3.28 MG/DL (ref 0.66–1.25)
DIFFERENTIAL METHOD BLD: ABNORMAL
EBV VCA IGG SER QL IA: NORMAL AI (ref 0–0.8)
EBV VCA IGM SER QL IA: NORMAL AI (ref 0–0.8)
EOSINOPHIL # BLD AUTO: 0 10E9/L (ref 0–0.7)
EOSINOPHIL NFR BLD AUTO: 0 %
ERYTHROCYTE [DISTWIDTH] IN BLOOD BY AUTOMATED COUNT: 17.4 % (ref 10–15)
FIBRINOGEN PPP-MCNC: 226 MG/DL (ref 200–420)
FIBRINOGEN PPP-MCNC: 232 MG/DL (ref 200–420)
GFR SERPL CREATININE-BSD FRML MDRD: 20 ML/MIN/1.7M2
GLUCOSE BLDC GLUCOMTR-MCNC: 106 MG/DL (ref 70–99)
GLUCOSE BLDC GLUCOMTR-MCNC: 113 MG/DL (ref 70–99)
GLUCOSE BLDC GLUCOMTR-MCNC: 118 MG/DL (ref 70–99)
GLUCOSE BLDC GLUCOMTR-MCNC: 126 MG/DL (ref 70–99)
GLUCOSE BLDC GLUCOMTR-MCNC: 128 MG/DL (ref 70–99)
GLUCOSE BLDC GLUCOMTR-MCNC: 130 MG/DL (ref 70–99)
GLUCOSE BLDC GLUCOMTR-MCNC: 132 MG/DL (ref 70–99)
GLUCOSE BLDC GLUCOMTR-MCNC: 135 MG/DL (ref 70–99)
GLUCOSE BLDC GLUCOMTR-MCNC: 148 MG/DL (ref 70–99)
GLUCOSE BLDC GLUCOMTR-MCNC: 151 MG/DL (ref 70–99)
GLUCOSE BLDC GLUCOMTR-MCNC: 153 MG/DL (ref 70–99)
GLUCOSE BLDC GLUCOMTR-MCNC: 155 MG/DL (ref 70–99)
GLUCOSE BLDC GLUCOMTR-MCNC: 156 MG/DL (ref 70–99)
GLUCOSE BLDC GLUCOMTR-MCNC: 198 MG/DL (ref 70–99)
GLUCOSE BLDC GLUCOMTR-MCNC: 247 MG/DL (ref 70–99)
GLUCOSE BLDC GLUCOMTR-MCNC: 284 MG/DL (ref 70–99)
GLUCOSE BLDC GLUCOMTR-MCNC: 293 MG/DL (ref 70–99)
GLUCOSE BLDC GLUCOMTR-MCNC: 298 MG/DL (ref 70–99)
GLUCOSE BLDC GLUCOMTR-MCNC: 300 MG/DL (ref 70–99)
GLUCOSE BLDC GLUCOMTR-MCNC: 301 MG/DL (ref 70–99)
GLUCOSE SERPL-MCNC: 111 MG/DL (ref 70–99)
HBV CORE AB SERPL QL IA: NONREACTIVE
HBV SURFACE AB SERPL IA-ACNC: 2.44 M[IU]/ML
HCO3 BLD-SCNC: 21 MMOL/L (ref 21–28)
HCT VFR BLD AUTO: 23.3 % (ref 40–53)
HCV AB SERPL QL IA: NORMAL
HGB BLD-MCNC: 8.4 G/DL (ref 13.3–17.7)
HIV 1+2 AB+HIV1 P24 AG SERPL QL IA: NORMAL
HLA FINAL CROSSMATCH RECIPIENT: NORMAL
INR PPP: 1.37 (ref 0.86–1.14)
INTERPRETATION ECG - MUSE: NORMAL
LACTATE BLD-SCNC: 0.6 MMOL/L (ref 0.7–2.1)
LACTATE BLD-SCNC: 1 MMOL/L (ref 0.7–2.1)
LYMPHOCYTES # BLD AUTO: 0.2 10E9/L (ref 0.8–5.3)
LYMPHOCYTES NFR BLD AUTO: 4.4 %
MAGNESIUM SERPL-MCNC: 2.4 MG/DL (ref 1.6–2.3)
MCH RBC QN AUTO: 32.6 PG (ref 26.5–33)
MCHC RBC AUTO-ENTMCNC: 36.1 G/DL (ref 31.5–36.5)
MCV RBC AUTO: 90 FL (ref 78–100)
MONOCYTES # BLD AUTO: 0.2 10E9/L (ref 0–1.3)
MONOCYTES NFR BLD AUTO: 4.4 %
NEUTROPHILS # BLD AUTO: 3.9 10E9/L (ref 1.6–8.3)
NEUTROPHILS NFR BLD AUTO: 91.2 %
NUM BPU REQUESTED: 1
O2/TOTAL GAS SETTING VFR VENT: 21 %
PCO2 BLD: 34 MM HG (ref 35–45)
PH BLD: 7.4 PH (ref 7.35–7.45)
PHOSPHATE SERPL-MCNC: 7 MG/DL (ref 2.5–4.5)
PLATELET # BLD AUTO: 23 10E9/L (ref 150–450)
PO2 BLD: 97 MM HG (ref 80–105)
POIKILOCYTOSIS BLD QL SMEAR: SLIGHT
POTASSIUM SERPL-SCNC: 3.7 MMOL/L (ref 3.4–5.3)
PROT SERPL-MCNC: 4.3 G/DL (ref 6.8–8.8)
RBC # BLD AUTO: 2.58 10E12/L (ref 4.4–5.9)
SODIUM SERPL-SCNC: 139 MMOL/L (ref 133–144)
TRANSFUSION STATUS PATIENT QL: NORMAL
TRANSFUSION STATUS PATIENT QL: NORMAL
WBC # BLD AUTO: 4.3 10E9/L (ref 4–11)

## 2017-03-06 PROCEDURE — 97162 PT EVAL MOD COMPLEX 30 MIN: CPT | Mod: GP | Performed by: PHYSICAL THERAPIST

## 2017-03-06 PROCEDURE — 25000132 ZZH RX MED GY IP 250 OP 250 PS 637: Performed by: INTERNAL MEDICINE

## 2017-03-06 PROCEDURE — 25000128 H RX IP 250 OP 636: Performed by: TRANSPLANT SURGERY

## 2017-03-06 PROCEDURE — 25000125 ZZHC RX 250: Performed by: STUDENT IN AN ORGANIZED HEALTH CARE EDUCATION/TRAINING PROGRAM

## 2017-03-06 PROCEDURE — 97168 OT RE-EVAL EST PLAN CARE: CPT | Mod: GO | Performed by: OCCUPATIONAL THERAPIST

## 2017-03-06 PROCEDURE — 80048 BASIC METABOLIC PNL TOTAL CA: CPT | Performed by: TRANSPLANT SURGERY

## 2017-03-06 PROCEDURE — 25000125 ZZHC RX 250: Performed by: TRANSPLANT SURGERY

## 2017-03-06 PROCEDURE — 40000133 ZZH STATISTIC OT WARD VISIT: Performed by: OCCUPATIONAL THERAPIST

## 2017-03-06 PROCEDURE — 83735 ASSAY OF MAGNESIUM: CPT | Performed by: TRANSPLANT SURGERY

## 2017-03-06 PROCEDURE — 85730 THROMBOPLASTIN TIME PARTIAL: CPT | Performed by: SURGERY

## 2017-03-06 PROCEDURE — 85384 FIBRINOGEN ACTIVITY: CPT | Performed by: TRANSPLANT SURGERY

## 2017-03-06 PROCEDURE — 12000024 ZZH R&B TRANSPLANT CRITICAL

## 2017-03-06 PROCEDURE — 85610 PROTHROMBIN TIME: CPT | Performed by: TRANSPLANT SURGERY

## 2017-03-06 PROCEDURE — 25000131 ZZH RX MED GY IP 250 OP 636 PS 637: Performed by: TRANSPLANT SURGERY

## 2017-03-06 PROCEDURE — 97530 THERAPEUTIC ACTIVITIES: CPT | Mod: GO | Performed by: OCCUPATIONAL THERAPIST

## 2017-03-06 PROCEDURE — 27210995 ZZH RX 272: Performed by: NURSE PRACTITIONER

## 2017-03-06 PROCEDURE — 94660 CPAP INITIATION&MGMT: CPT

## 2017-03-06 PROCEDURE — 84100 ASSAY OF PHOSPHORUS: CPT | Performed by: TRANSPLANT SURGERY

## 2017-03-06 PROCEDURE — 99231 SBSQ HOSP IP/OBS SF/LOW 25: CPT | Mod: GC | Performed by: SURGERY

## 2017-03-06 PROCEDURE — 00000146 ZZHCL STATISTIC GLUCOSE BY METER IP

## 2017-03-06 PROCEDURE — 82803 BLOOD GASES ANY COMBINATION: CPT | Performed by: SURGERY

## 2017-03-06 PROCEDURE — 40000014 ZZH STATISTIC ARTERIAL MONITORING DAILY

## 2017-03-06 PROCEDURE — 83605 ASSAY OF LACTIC ACID: CPT | Performed by: SURGERY

## 2017-03-06 PROCEDURE — 25000128 H RX IP 250 OP 636: Performed by: STUDENT IN AN ORGANIZED HEALTH CARE EDUCATION/TRAINING PROGRAM

## 2017-03-06 PROCEDURE — 40000275 ZZH STATISTIC RCP TIME EA 10 MIN

## 2017-03-06 PROCEDURE — P9037 PLATE PHERES LEUKOREDU IRRAD: HCPCS | Performed by: STUDENT IN AN ORGANIZED HEALTH CARE EDUCATION/TRAINING PROGRAM

## 2017-03-06 PROCEDURE — 80076 HEPATIC FUNCTION PANEL: CPT | Performed by: TRANSPLANT SURGERY

## 2017-03-06 PROCEDURE — 85384 FIBRINOGEN ACTIVITY: CPT | Performed by: SURGERY

## 2017-03-06 PROCEDURE — 40000225 ZZH STATISTIC SLP WARD VISIT

## 2017-03-06 PROCEDURE — 40000940 XR CHEST PORT 1 VW

## 2017-03-06 PROCEDURE — 85730 THROMBOPLASTIN TIME PARTIAL: CPT | Performed by: TRANSPLANT SURGERY

## 2017-03-06 PROCEDURE — 25000132 ZZH RX MED GY IP 250 OP 250 PS 637: Performed by: SURGERY

## 2017-03-06 PROCEDURE — 82330 ASSAY OF CALCIUM: CPT | Performed by: SURGERY

## 2017-03-06 PROCEDURE — 92610 EVALUATE SWALLOWING FUNCTION: CPT | Mod: GN

## 2017-03-06 PROCEDURE — 27210995 ZZH RX 272: Performed by: TRANSPLANT SURGERY

## 2017-03-06 PROCEDURE — 25000132 ZZH RX MED GY IP 250 OP 250 PS 637: Performed by: TRANSPLANT SURGERY

## 2017-03-06 PROCEDURE — 97530 THERAPEUTIC ACTIVITIES: CPT | Mod: GP | Performed by: PHYSICAL THERAPIST

## 2017-03-06 PROCEDURE — 02HV33Z INSERTION OF INFUSION DEVICE INTO SUPERIOR VENA CAVA, PERCUTANEOUS APPROACH: ICD-10-PCS | Performed by: SURGERY

## 2017-03-06 PROCEDURE — 97535 SELF CARE MNGMENT TRAINING: CPT | Mod: GO | Performed by: OCCUPATIONAL THERAPIST

## 2017-03-06 PROCEDURE — 85025 COMPLETE CBC W/AUTO DIFF WBC: CPT | Performed by: TRANSPLANT SURGERY

## 2017-03-06 PROCEDURE — 92526 ORAL FUNCTION THERAPY: CPT | Mod: GN

## 2017-03-06 PROCEDURE — 40000193 ZZH STATISTIC PT WARD VISIT: Performed by: PHYSICAL THERAPIST

## 2017-03-06 RX ORDER — DIPHENHYDRAMINE HCL 25 MG
25-50 CAPSULE ORAL EVERY 6 HOURS PRN
Status: DISCONTINUED | OUTPATIENT
Start: 2017-03-06 | End: 2017-03-10 | Stop reason: HOSPADM

## 2017-03-06 RX ORDER — TACROLIMUS 1 MG/1
1 CAPSULE, GELATIN COATED ORAL
Status: DISCONTINUED | OUTPATIENT
Start: 2017-03-06 | End: 2017-03-07

## 2017-03-06 RX ORDER — MULTIPLE VITAMINS W/ MINERALS TAB 9MG-400MCG
1 TAB ORAL DAILY
Status: DISCONTINUED | OUTPATIENT
Start: 2017-03-07 | End: 2017-03-10 | Stop reason: HOSPADM

## 2017-03-06 RX ORDER — HYDRALAZINE HYDROCHLORIDE 20 MG/ML
10-20 INJECTION INTRAMUSCULAR; INTRAVENOUS EVERY 6 HOURS PRN
Status: DISCONTINUED | OUTPATIENT
Start: 2017-03-06 | End: 2017-03-10 | Stop reason: HOSPADM

## 2017-03-06 RX ADMIN — PIPERACILLIN AND TAZOBACTAM 2.25 G: 2; .25 INJECTION, POWDER, LYOPHILIZED, FOR SOLUTION INTRAVENOUS; PARENTERAL at 11:18

## 2017-03-06 RX ADMIN — METHYLPREDNISOLONE SODIUM SUCCINATE 100 MG: 125 INJECTION, POWDER, LYOPHILIZED, FOR SOLUTION INTRAMUSCULAR; INTRAVENOUS at 08:32

## 2017-03-06 RX ADMIN — SODIUM CHLORIDE 90 ML/HR: 4.5 INJECTION, SOLUTION INTRAVENOUS at 02:34

## 2017-03-06 RX ADMIN — PIPERACILLIN AND TAZOBACTAM 2.25 G: 2; .25 INJECTION, POWDER, LYOPHILIZED, FOR SOLUTION INTRAVENOUS; PARENTERAL at 17:38

## 2017-03-06 RX ADMIN — MICAFUNGIN SODIUM 150 MG: 10 INJECTION, POWDER, LYOPHILIZED, FOR SOLUTION INTRAVENOUS at 14:23

## 2017-03-06 RX ADMIN — SULFAMETHOXAZOLE AND TRIMETHOPRIM 1 TABLET: 400; 80 TABLET ORAL at 08:29

## 2017-03-06 RX ADMIN — Medication 20 MG: at 08:30

## 2017-03-06 RX ADMIN — HYDROMORPHONE HYDROCHLORIDE 0.5 MG: 10 INJECTION, SOLUTION INTRAMUSCULAR; INTRAVENOUS; SUBCUTANEOUS at 20:00

## 2017-03-06 RX ADMIN — HUMAN INSULIN 6 UNITS/HR: 100 INJECTION, SOLUTION SUBCUTANEOUS at 18:39

## 2017-03-06 RX ADMIN — MULTIVITAMIN 15 ML: LIQUID ORAL at 08:30

## 2017-03-06 RX ADMIN — SODIUM CHLORIDE: 4.5 INJECTION, SOLUTION INTRAVENOUS at 14:15

## 2017-03-06 RX ADMIN — HYDRALAZINE HYDROCHLORIDE 10 MG: 20 INJECTION INTRAMUSCULAR; INTRAVENOUS at 08:02

## 2017-03-06 RX ADMIN — MICONAZOLE NITRATE: 2 POWDER TOPICAL at 08:31

## 2017-03-06 RX ADMIN — TACROLIMUS 1 MG: 1 CAPSULE, GELATIN COATED ORAL at 17:38

## 2017-03-06 RX ADMIN — HYDROMORPHONE HYDROCHLORIDE 0.3 MG: 10 INJECTION, SOLUTION INTRAMUSCULAR; INTRAVENOUS; SUBCUTANEOUS at 16:03

## 2017-03-06 RX ADMIN — MYCOPHENOLATE MOFETIL 1000 MG: 250 CAPSULE ORAL at 08:27

## 2017-03-06 RX ADMIN — PIPERACILLIN AND TAZOBACTAM 2.25 G: 2; .25 INJECTION, POWDER, LYOPHILIZED, FOR SOLUTION INTRAVENOUS; PARENTERAL at 05:17

## 2017-03-06 RX ADMIN — ASPIRIN 325 MG ORAL TABLET 325 MG: 325 PILL ORAL at 08:29

## 2017-03-06 RX ADMIN — SODIUM BICARBONATE 650 MG TABLET 1300 MG: at 20:20

## 2017-03-06 RX ADMIN — MYCOPHENOLATE MOFETIL 1000 MG: 250 CAPSULE ORAL at 17:38

## 2017-03-06 RX ADMIN — VALGANCICLOVIR HYDROCHLORIDE 450 MG: 450 TABLET ORAL at 08:29

## 2017-03-06 RX ADMIN — Medication 1 MG: at 08:30

## 2017-03-06 RX ADMIN — MICONAZOLE NITRATE: 2 POWDER TOPICAL at 20:21

## 2017-03-06 RX ADMIN — SODIUM BICARBONATE 650 MG TABLET 1300 MG: at 08:29

## 2017-03-06 RX ADMIN — HUMAN INSULIN 7 UNITS/HR: 100 INJECTION, SOLUTION SUBCUTANEOUS at 01:19

## 2017-03-06 RX ADMIN — HUMAN INSULIN 2 UNITS/HR: 100 INJECTION, SOLUTION SUBCUTANEOUS at 12:27

## 2017-03-06 ASSESSMENT — PAIN DESCRIPTION - DESCRIPTORS: DESCRIPTORS: ACHING

## 2017-03-06 ASSESSMENT — ACTIVITIES OF DAILY LIVING (ADL): PREVIOUS_RESPONSIBILITIES: MEAL PREP;HOUSEKEEPING;LAUNDRY;SHOPPING;MEDICATION MANAGEMENT;FINANCES;DRIVING

## 2017-03-06 NOTE — PLAN OF CARE
Problem: Goal Outcome Summary  Goal: Goal Outcome Summary  Outcome: Improving  Pt s/p liver transplant POD #2     Pain: Denies pain, except some slight R leg pain after walking. 0.2 of dilaudid given.   Neuro: A+O x2 with intermittent confusion. Able to move all extremities. PERRLA.   CV: NSR 60-70's, BP stable. Afebrile.   Resp: Sating upper 90's -100's on RA.   GI: Regular diet. No stool, but passing gas.   : Crowe with adequate UOP.   Skin: Abdominal surgical incision dressing is CDI. Right abdomen TABITHA with serousang drainage.   Hema: Platelets given this morning. Tolerated well.  Endo: Continues on insulin drip, but has carb count and sub q insulin to give with meals/snacks/supplements.       Continue with pain management and BG checks, encourage ambulation and IS use. Notify MD of changes. Transfer to 7th floor.

## 2017-03-06 NOTE — PROGRESS NOTES
Diabetes Consult Daily  Progress Note          Assessment/Plan:   Camacho Bhagat is a 51 year old with uncontrolled type 2 diabetes and with end stage liver disease and listed for transplant, admitted to [facility] 2/21/2017 with worsening encephalopathy and concern for infection. He was experiencing persistent hyperglycemia in the setting of likely omitted aspart, building glucose toxicity, and potentially infection (SBP negative) .   Now s/p DDOLT on (3/4/2017), extubated and working with therapies  Plan:  Continue on insulin drip, while on methylprednisolone  -speech to assess for oral intake ( TF have not started)  - will add meal insulin if ok for oral intake ( aspart 1 unit per 15 grams of CHO with meals and snacks)    Will continue to follow           Interval History:     On insulin drip, glucose within target on 2-3 units per hour.  Has been given methylprednisolone 200 mg POD # 1, 100 mg (3/6), 50 mg POD # 3, followed by Prednisone 25 mg POD #4, prednisone 10 mg on POD # 5  NPO until assess by speech therapy  Working with therapeis        Recent Labs  Lab 03/06/17  1229 03/06/17  1117 03/06/17  0918 03/06/17  0759 03/06/17  0706 03/06/17  0614  03/06/17  0329  03/05/17  1707  03/05/17  1016  03/05/17  0358  03/04/17  2037 03/04/17  1950   GLC  --   --   --   --   --   --   --  111*  --  204*  --  108*  --  240*  --  205* 218*   * 155* 135* 132* 130* 126*  < >  --   < >  --   < >  --   < >  --   < >  --   --    < > = values in this interval not displayed.            Review of Systems:      please see interval hsitory       Medications:       Active Diet Order      Advance Diet as Tolerated: Regular Diet Adult     Physical Exam:  Gen: Alert,  NAD   HEENT: NC/AT, mucous membranes are moist  Resp: Unlabored  Ext: moving all extremities  Neuro:oriented x3, communicating clearly  /74 (BP Location: Right arm)  Pulse 65  Temp 98.4  F (36.9  C) (Oral)  Resp 18  Ht 1.829 m (6')  Wt 97.3 kg  (214 lb 8.1 oz)  SpO2 100%  BMI 29.09 kg/m2           Data:     Lab Results   Component Value Date    A1C 9.4 02/16/2017    A1C 6.2 12/14/2016    A1C 6.1 10/27/2016    A1C 8.3 07/02/2012    A1C 8.5 07/31/2009              CBC RESULTS:   Recent Labs   Lab Test  03/06/17   0329   WBC  4.3   RBC  2.58*   HGB  8.4*   HCT  23.3*   MCV  90   MCH  32.6   MCHC  36.1   RDW  17.4*   PLT  23*     Recent Labs   Lab Test  03/06/17   0329  03/05/17   1707   NA  139  139   POTASSIUM  3.7  3.6   CHLORIDE  102  105   CO2  22  22   ANIONGAP  15*  11   GLC  111*  204*   BUN  103*  98*   CR  3.28*  2.98*   JACOB  7.6*  7.2*     Liver Function Studies -   Recent Labs   Lab Test  03/06/17   0329   PROTTOTAL  4.3*   ALBUMIN  2.4*   BILITOTAL  5.2*   ALKPHOS  69   AST  157*   ALT  209*     Lab Results   Component Value Date    INR 1.37 03/06/2017    INR 1.45 03/05/2017    INR 1.62 03/05/2017    INR 1.94 03/04/2017    INR 1.81 03/04/2017    INR 2.19 03/04/2017    INR 1.84 03/04/2017    INR 1.81 03/04/2017    INR 2.05 03/04/2017    INR 1.67 03/04/2017    INR 1.82 03/03/2017    INR 1.73 03/03/2017           Alesha Sauer, CNP pager [phone]  Diabetes Management Job Code [id number]

## 2017-03-06 NOTE — PROGRESS NOTES
Nephrology Progress Note  03/06/2017       Interval History :   S/p transplant 3/4, doing well, got contrast last night but hepatic vasculature intact and LFT's trending down. He feels well, no new complaints. Has IVF's running this morning. BP is elevated.    Assessment & Recommendations:   51yM with hx of CASTAÑEDA cirrhosis/ESLD with portal HTN requiring twice weekly paracentesis, T2DM on insulin, HCC s/p TACE in 9/2016, GERD on PPI, and normal baseline renal function that is admitted with hepatic encephalopathy, hyperglycemia, and decompensated liver disease now with rising Cr.     1. EJ, baseline Cr 0.7-0.8, admission Cr 1.4, acute rise with HRS vs bili cast nephropathy, now s/p liver tx and UOP picking up but Cr still with slow rise. Got contrast 3/5, on low dose tac for IS.  2. OLT on 3/4 for CASTAÑEDA cirrhosis, LFT's trending down, on IS including MMF and tac, CTA with intact hepatic vasculature  3. Metabolic acidosis, likely 2/2 EJ, improved  4. Anemia, Hgb stable today  5. Hx of HCC s/p TACE 9/2016, repeat MRI with residual tumor but <5cm -> now s/p transplant  6. GERD on PPI  7. Hyperglycemia with T2DM, on insulin gtt     Recs:  1. Stop continuous IVF's  2. No dialysis indication today  3. Daily labs and weights and strict I/O's, will need to monitor for dialysis needs with slow rise in Cr and contrast administration on 3/5     Staffed with Dr. Rubio.      Doug Elaine  Nephrology Fellow  Pager: [phone]      Review of Systems:   A 4 point review of systems was negative except as noted above.  Notably: fair appetite. no nausea or vomiting or diarrhea.  no confusion no fever or chills    Physical Exam:   /64  Pulse 65  Temp 98.6  F (37  C) (Oral)  Resp 16  Ht 1.829 m (6')  Wt 97.3 kg (214 lb 8.1 oz)  SpO2 100%  BMI 29.09 kg/m2     Ins and Outs  Intake/Output Summary (Last 24 hours) at 03/06/17 8298  Last data filed at 03/06/17 1700   Gross per 24 hour   Intake          4303.07 ml   Output              2621 ml   Net          1682.07 ml     General: resting comfortably  HEENT: MMM  Heart: RRR, no murmur  Lungs: CTA anterior and posterior  Abd: abdomen less distended, drain in place as well as dressing, minimal tenderness  Ext: no edema to trace in feet/ankles  Access : no HD access    Labs:   All of today's labs reviewed.    Imaging:  Today's imaging reviewed.    Current Medications:  All Reviewed    Doug Elaine  Nephrology Fellow  Pager: [phone]    I was present with the fellow during the history and exam.  I discussed the case with the fellow and agree with the findings as documented in the assessment and plan.  Jael Rubio

## 2017-03-06 NOTE — PLAN OF CARE
Problem: Goal Outcome Summary  Goal: Goal Outcome Summary     Clinical dysphagia examination completed per MD order. The pt presents WNL oropharyngeal swallowing function s/p extubation on 3/5/17. Pt's voice is strong, oral coordination, symmetry, and strength are all good. Pt demonstrated good bolus control without oral residue or outward signs/symptoms of aspiration. Recommend a regular texture diet and thin liquids. SLP is signing of as skilled tx is not indicated.

## 2017-03-06 NOTE — PROGRESS NOTES
03/06/17 1200   General Information   Onset Date 02/21/17   Start of Care Date 03/06/17   Referring Physician Jose Talley MD   Patient Profile Review/OT: Additional Occupational Profile Info See Profile for full history and prior level of function   Patient/Family Goals Statement pt would like to eat/drink today   Swallowing Evaluation Bedside swallow evaluation   Behaviorial Observations WNL (within normal limits)   Mode of current nutrition NPO   Respiratory Status Room air  (intubated 3/4/17-3/5/17)   Comments 52 y/o male with cirrhosis of the liver due to CASTAÑEDA complicated by HCC and admitted to the hospital on 2/21/17 for hepatic encephalopathy. He is POD #2 for DDOLT on 3/4/17.  SLP consulted to assess swallowing safety following extubation on 3/5/17.   Clinical Swallow Evaluation   Oral Musculature generally intact   Structural Abnormalities none present   Dentition present and adequate   Secretion Management other (see comments)  (WNL)   Mucosal Quality good   Mandibular Strength and Mobility intact   Oral Labial Strength and Mobility WFL   Lingual Strength and Mobility WFL   Velar Elevation intact   Buccal Strength and Mobility intact   Laryngeal Function Cough;Swallow;Throat clear;Dry swallow palpated;Voicing initiated   Oral Musculature Comments intact   Additional Documentation Yes   Clinical Swallow Eval: Thin Liquid Texture Trial   Mode of Presentation, Thin Liquids cup;self-fed   Volume of Liquid or Food Presented 8oz thin H20   Oral Phase of Swallow WFL   Pharyngeal Phase of Swallow intact   Diagnostic Statement WNL with thin liquid consistency   Clinical Swallow Eval: Solid Food Texture Trial   Mode of Presentation, Solid self-fed   Volume of Solid Food Presented 2 korina crackers   Oral Phase, Solid WFL   Oral Residue, Solid other (see comments)  (none present)   Pharyngeal Phase, Solid intact   Diagnostic Statement WNL with regular texture solid, RN reports no difficulty with whole pills  & H20   Esophageal Phase of Swallow   Patient reports or presents with symptoms of esophageal dysphagia No   General Therapy Interventions   Intervention Comments tx not indicated   Swallow Eval: Clinical Impressions   Skilled Criteria for Therapy Intervention No problems identified which require skilled intervention   Functional Assessment Scale (FAS) 7   Treatment Diagnosis WNL oropharyngeal swallowing mechanism   Diet texture recommendations Regular diet;Thin liquids   Demonstrates Need for Referral to Another Service occupational therapy;physical therapy   Therapy Frequency other (see comments)  (evaluation only, tx not indicated)   Anticipated Discharge Disposition inpatient rehabilitation facility   Risks and Benefits of Treatment have been explained. Yes   Patient, family and/or staff in agreement with Plan of Care Yes   Clinical Impression Comments Clinical dysphagia examination completed per MD order. The pt presents WNL oropharyngeal swallowing function s/p extubation on 3/5/17. Pt's voice is strong, oral coordination, symmetry, and strength are all good. Pt demonstrated good bolus control without oral residue or outward signs/symptoms of aspiration. Recommend a regular texture diet and thin liquids. SLP is signing of as skilled tx is not indicated.    Total Evaluation Time   Total Evaluation Time (Minutes) 13

## 2017-03-06 NOTE — PROGRESS NOTES
Final positive donor sputum culture results have been uploaded into [facility]. Donor ID [id number].  Dr. Cross notified of results.

## 2017-03-06 NOTE — PROCEDURES
Procedure: Central Venous Catheter Placement    Technique: The right neck and inferior previously placed MAC line was prepped with 2% chlorhexidine and draped with a full length sterile sheet in the usual fashion.  1% lidocaine was administered subcutaneously for local anesthesia.  A wire was passed through the previously placed line using Seldinger technique. Ectopy was noted. The MAC line was removed. Bleeding was controlled with direct pressure. He was taken out of Trendelenburg. A new triple lumen central venous catheter was passed over the wire to 15cm at the skin. Blood was withdrawn from all lumens and flushed with normal saline. The line was sutured into place with silk suture. A biopatch and sterile dressing was applied prior to removal of the sterile drapes. The superior MAC line was then removed and hemostasis was controlled with direct pressure for 10 minutes. A pressure dressing was then applied.       The patient tolerated the procedure well and there were no complications. Chest x-ray is pending at this time.      Dr. Hernandez was immediately available for the critical portions of the procedure.    Jose Talley MD  General Surgery PGY-2  Pager [phone]

## 2017-03-06 NOTE — PROGRESS NOTES
Liver Transplant Social Work  D: I attempted to see  this afternoon, as he received a  donor liver transplant over the weekend. He was unavailable at that time.   I: Chart review.   P: To remain available for support, disposition needs and other psychosocial needs that may arise for . (pager [phone])

## 2017-03-06 NOTE — PROGRESS NOTES
Transplant Surgery  Inpatient Daily Progress Note  03/06/2017    Assessment & Plan: 51M cirrhosis CASTAÑEDA POD#2 OLT    Graft function: good, improved. POD 1 US unable to visualize HA. CTA performed, main, left and right central HA patent, portal vein patent. LFTs trending down.  Immunosuppression management:   -Simulect and steroids per protocol  -MMF 1 gm BID  -Tacrolimus 1 mg BID continue today. Check level on Tuesday.     Complexity of management:High. Contributing factors: organ dysfunction and new transplant, acute on chronic kidney injury  Hematology: Hgb 8.4, stable. PLT 23 (22). INR 1.37. Start  mg for HA ppx.   Cardiorespiratory: Extubated POD 1. Stable on RA. OOB to chair. Aggressive pulmonary toilet.  GI/Nutrition: Regular diet.  calorie counts. If poor intake, will place NJ and start TF.    Endocrine: hx DM type 2. PTA Lantus 65 units and Novolog SSI. Hyperglycemia 2/2 steroids. On insulin gtt, 2-8 units/hr. Continue today. Endocrine team consulting.  Renal/Fluid/Electrolytes: Acute on chronic kidney injury. PTA Cr 0.8, admitted with dehydration, Cr 1.3. Now with acute worsening of EJ due to liver transplant and IV contrast (CTA imaging of HA).  cc today. K 3.7. Phos elevated. Avoid nephrotoxins.  Stopped MIV. Maintain adequate oral fluid intake.   : Keep to vazquez I&O today. Plan to remove vazquez tomorrow.   Infectious disease: periop antibiotics  Prophylaxis: DVT PCDs,PPI  Disposition: Transfer to     Medical Decision Making: High  Admit [id number] (high level decision making)    PILLO/Fellow/Resident Provider: Ada Driver PAC [id number]    Faculty: Drew Dalal M.D.  _________________________________________________________________  Transplant History: Admitted 2/21/2017 for OLT.  3/4/2017 (Liver), Postoperative day: 2     Interval History: History is obtained from the patient  Overnight events: extubated. No SOB. Pain controlled. Denies nausea. Plan to be OOB to  Chair today.     ROS:   TERESA  10-point review of systems was negative except as noted above.    Meds:    [START ON 3/7/2017] omeprazole  20 mg Oral QAM AC     [START ON 3/7/2017] multivitamin, therapeutic with minerals  1 tablet Oral Daily     tacrolimus  1 mg Oral BID IS     protein modular  1 packet Per Feeding Tube BID     aspirin  325 mg Oral or Feeding Tube Daily     sodium chloride (PF)  3 mL Intravenous Q8H     [START ON 3/7/2017] methylPREDNISolone  50 mg Intravenous Once    Followed by     [START ON 3/8/2017] predniSONE  25 mg Oral Once    Followed by     [START ON 3/9/2017] predniSONE  10 mg Oral Once     valGANciclovir  450 mg Oral Every Other Day    Or     valGANciclovir  450 mg Oral or NG Tube Every Other Day     sulfamethoxazole-trimethoprim  1 tablet Oral Daily     piperacillin-tazobactam  2.25 g Intravenous Q6H     mycophenolate  1,000 mg Oral BID IS    Or     mycophenolate  1,000 mg Oral or NG Tube BID IS     [START ON 3/8/2017] basiliximab (SIMULECT) infusion  20 mg Intravenous Once     micafungin  150 mg Intravenous Q24H     sodium bicarbonate  1,300 mg Oral BID     miconazole with skin protectant   Topical BID     miconazole   Topical BID     Topical skin adhesive   Topical Once     gadobutrol  0.1 mL/kg Intravenous Once       Physical Exam:     Admit Weight: 103.9 kg (229 lb) (bed scale)    Current vitals:   /70  Pulse 65  Temp 98.4  F (36.9  C) (Oral)  Resp 18  Ht 1.829 m (6')  Wt 97.3 kg (214 lb 8.1 oz)  SpO2 100%  BMI 29.09 kg/m2        Vital sign ranges:    Temp:  [97.6  F (36.4  C)-98.5  F (36.9  C)] 98.4  F (36.9  C)  Pulse:  [65] 65  Heart Rate:  [58-77] 66  Resp:  [10-18] 18  BP: (129-137)/(67-72) 135/70  MAP:  [66 mmHg-94 mmHg] 94 mmHg  Arterial Line BP: (121-165)/(45-67) 159/65  SpO2:  [96 %-100 %] 100 %  Patient Vitals for the past 24 hrs:   BP Temp Temp src Pulse Heart Rate Resp SpO2 Weight   03/06/17 1200 - 98.4  F (36.9  C) Oral - 66 18 100 % -   03/06/17 1133 - - - - 64 - 100 % -   03/06/17 1100 135/70 -  - - 67 - 98 % -   03/06/17 1000 137/72 - - - 63 18 99 % -   03/06/17 0945 - - - - 62 - 99 % -   03/06/17 0930 - 97.6  F (36.4  C) Oral - 63 - 99 % -   03/06/17 0920 - 97.7  F (36.5  C) Oral - 63 - 99 % -   03/06/17 0900 129/67 - - - 64 - 99 % -   03/06/17 0800 - 97.7  F (36.5  C) Oral - 63 14 100 % -   03/06/17 0700 - - - - 66 - 99 % 97.3 kg (214 lb 8.1 oz)   03/06/17 0600 - - - - 62 - 98 % -   03/06/17 0500 - - - - 63 - 99 % -   03/06/17 0400 - 97.7  F (36.5  C) Oral - 64 14 99 % -   03/06/17 0300 - - - - 60 - 100 % -   03/06/17 0200 - - - - 58 - 100 % -   03/06/17 0100 - - - - 61 - 100 % -   03/06/17 0000 - 97.9  F (36.6  C) Oral - 63 12 99 % -   03/05/17 2300 - - - - 64 - 100 % -   03/05/17 2200 - - - - 72 13 96 % -   03/05/17 2100 - - - - 65 10 100 % -   03/05/17 2045 - - - - 63 - 99 % -   03/05/17 2030 - - - - 60 - 100 % -   03/05/17 2015 - - - - 64 12 100 % -   03/05/17 2000 - - - - 69 11 99 % -   03/05/17 1945 - - - - 61 11 98 % -   03/05/17 1930 - 98.2  F (36.8  C) Oral - 63 10 99 % -   03/05/17 1915 - - - - 63 - 98 % -   03/05/17 1900 - - - - 65 - 98 % -   03/05/17 1845 - - - - 62 - 98 % -   03/05/17 1830 - - - - 64 - 99 % -   03/05/17 1825 - 98.5  F (36.9  C) Oral - - 12 - -   03/05/17 1815 - 98.2  F (36.8  C) Oral 65 66 12 100 % -   03/05/17 1800 - - - - 77 - 100 % -   03/05/17 1745 - - - - 76 - 100 % -   03/05/17 1730 - - - - 64 - 100 % -   03/05/17 1715 - - - - 71 - 100 % -   03/05/17 1645 - - - - 71 - 99 % -   03/05/17 1630 - - - - 71 - 100 % -   03/05/17 1615 - - - - 73 - 100 % -   03/05/17 1600 - - - - - 12 - -   03/05/17 1530 - - - - 66 - 100 % -   03/05/17 1515 - - - - 70 - 100 % -   03/05/17 1500 - - - - 67 - 100 % -   03/05/17 1445 - - - - 71 - 100 % -   03/05/17 1430 - - - - 69 - 99 % -   03/05/17 1415 - - - - 71 - 99 % -   03/05/17 1400 - 98.1  F (36.7  C) Pulm Art - - - - -   03/05/17 1345 - - - - 69 - 99 % -   03/05/17 1330 - - - - 68 - 100 % -   03/05/17 1300 - - - - 74 - 100 % -     General  Appearance: in no apparent distress.   Skin: normal, jaundice  Heart: NSR  Lungs: BCTA on RA  Abdomen: The abdomen is flat, and  non-tender. he is not tender over the liver graft. The wound is intact, dry  : vazquez is present.   Urine has no gross hematuria.   Extremities: edema: present bilaterally. 1+  Neurologic: awake. Tremor absent.  CVC    Data:   CMP  Recent Labs  Lab 03/06/17  0329 03/05/17  1707 03/05/17  1016 03/05/17  0358  03/03/17  1458    139 140 140  < > 131*   POTASSIUM 3.7 3.6 3.7 3.9  < > 3.5   CHLORIDE 102 105 108 106  < > 98   CO2 22 22 21 20  < > 17*   * 204* 108* 240*  < > 371*   * 98* 95* 91*  < > 99*   CR 3.28* 2.98* 2.97* 2.71*  < > 2.78*   GFRESTIMATED 20* 22* 22* 25*  < > 24*   GFRESTBLACK 24* 27* 27* 30*  < > 29*   JACOB 7.6* 7.2* 7.8* 8.1*  < > 8.5   ICAW 4.4  --   --  4.7  < >  --    MAG 2.4*  --  2.3 2.2  --  2.7*   PHOS 7.0*  --  5.1* 5.3*  --  4.6*   AMYLASE  --   --   --  53  --  70   LIPASE  --   --   --  216  --   --    ALBUMIN 2.4*  --   --  2.6*  < > 3.0*   BILITOTAL 5.2*  --   --  13.4*  < > 35.2*   ALKPHOS 69  --   --  72  < > 200*   *  --   --  680*  < > 116*   *  --   --  329*  < > 72*   < > = values in this interval not displayed.  CBC    Recent Labs  Lab 03/06/17 0329 03/05/17 2217   HGB 8.4* 8.1*   WBC 4.3 3.1*   PLT 23* 22*     COAGS    Recent Labs  Lab 03/06/17 0329 03/05/17  1016   INR 1.37* 1.45*   PTT 31 36      Urinalysis  Recent Labs   Lab Test  03/03/17   1405  03/01/17   0340   04/11/16   1345  02/08/16   1234   COLOR  Dark Yellow  Dark Yellow   < >  Yellow  Yellow   APPEARANCE  Slightly Cloudy  Slightly Cloudy   < >  Clear  Clear   URINEGLC  Negative  Negative   < >  30*  >1000*   URINEBILI  Moderate   This is an unconfirmed screening test result. A positive result may be false.  *  Moderate   This is an unconfirmed screening test result. A positive result may be false.  *   < >  Negative  Negative   URINEKETONE  Negative   Negative   < >  Negative  Negative   SG  1.010  1.011   < >  1.016  1.020   UBLD  Negative  Negative   < >  Small*  Small*   URINEPH  5.0  5.0   < >  5.0  5.0   PROTEIN  Negative  Negative   < >  30*  30*   NITRITE  Negative  Negative   < >  Negative  Negative   LEUKEST  Negative  Negative   < >  Negative  Negative   RBCU  1  <1   < >  1  2   WBCU  0  3*   < >  1  1   UTPG   --    --    --   0.41*  0.33*    < > = values in this interval not displayed.     Virology:  Hepatitis C Antibody   Date Value Ref Range Status   03/03/2017  NR Final    Nonreactive   Assay performance characteristics have not been established for newborns,   infants, and children     I have reviewed history, examined patient and discussed plan with the fellow/resident/PILLO.  I concur with the findings in this note.

## 2017-03-06 NOTE — PROGRESS NOTES
SURGICAL ICU PROGRESS NOTE  03/06/2017    CO-MORBIDITIES:   Hepatic encephalopathy (H)  Biliary cirrhosis (H)  Acute renal failure, unspecified acute renal failure type (H)  Altered mental status, unspecified altered mental status type    ASSESSMENT: 52 y/o male with insulin dependent DM, HTN, cirrhosis of the liver due to CASTAÑEDA complicated by HCC and admitted to the hospital on 2/21/17 for hepatic encephalopathy. He is now s/p DDOLT on 3/4/17. Extubated and pain well controlled. Will transfer to the floor today.      Today's plan:   - SLP consult - passed  (ADAT)   - Overwire Cordis--> triple lumen before transfer to floor   - To floor today     PLAN:  Neuro/ pain/ sedation: History of hepatic encephalopathy, depressive d/o  - Monitor neurological status. Notify the MD for any acute changes in exam.  - PRN APAP, dilaudid  - Hold PTA gabapentin, flexeril.     Pulmonary care:   - Extubated 3/5, breathing well on room air  - Supplemental O2 by NC if SpO2<92%     Cardiovascular: History of HTN  - Monitor hemodynamic status.   - Keep SBP > 110   -- has not needed vasopressors for >24 hours  - Hold PTA lisinopril, furosemide, spironolactone  -  started 3/5    GI care: History of CASTAÑEDA, HCC, GERD; S/P DDOLT on 3/4/2017   - NPO  - IV PPI  - Transplant to handle immunosuppressive agents.     Fluids/ Electrolytes/ Nutrition:   - MIVF 0.5 NS @ 100cc/hr  - ICU electrolyte replacement protocol  - No indication for parenteral nutrition.  - NJ placement today and start nutrition    --- Nutrition consult placed, SLP to ADAT     Renal/ Fluid Balance: EJ on CKD with increasing Cr  - Crowe in place for strict I&O    -- Urine output ~ 0.5 cc/kg/hr last 24 hours  - Diuresis 3/5 with lasix 20mg  - Nephrology consulted for EJ and increasing Cr, no dialysis recommendations     Endocrine:  History of insulin-dependent type II DM  - Insulin gtt  - Hold PTA insulin glargine/lispro     ID/ Antibiotics:  - Pre operative antibiotics:  Zosyn, 2 more doses today  - Induction immunosupression by transplant      Heme: Parameters per transplant surgery  - Keep hemoglobin >8  - Keep INR <2  - Keep fibrinogen > 200  - Keep Platelets >50  - Transfused platelets and cryo 3/5, red cells and platelets 3/6      Prophylaxis:    - Mechanical prophylaxis for DVT.   - No chemical DVT prophylaxis due to high risk of bleeding.      MSK: History of osteoarthritis, fibromyalgia   - PT and OT consulted. Appreciate recs.     Lines/ tubes/ drains:  - R IJ CVC, PIV, vazquez, TABITHA     Disposition:  - To floor today        Patient's findings and plan discussed with ICU staff, Dr. Hernandez.     Sanjuanita Burgess MD  Surgery PGY-1  [phone]      ====================================    SUBJECTIVE:   No acute events overnight. Required 1 unit PRBC for hgb 7.6 overnight and 1 unit platelets for plts 23.    Patient denies chest pain, shortness of breath, fevers, chills, nausea, vomiting, diarrhea, or constipation. Feels well, wishes to drink fluids.       OBJECTIVE:   1. VITAL SIGNS:   Temp:  [97.7  F (36.5  C)-99  F (37.2  C)] 97.7  F (36.5  C)  Pulse:  [65] 65  Heart Rate:  [58-83] 63  Resp:  [10-16] 14  BP: (152)/(58) 152/58  MAP:  [63 mmHg-92 mmHg] 91 mmHg  Arterial Line BP: (119-164)/(45-67) 164/65  FiO2 (%):  [35 %] 35 %  SpO2:  [96 %-100 %] 99 %  Ventilation Mode: CMV/AC  FiO2 (%): 35 %  Rate Set (breaths/minute): 16 breaths/min  Tidal Volume Set (mL): 550 mL  PEEP (cm H2O): 5 cmH2O  Oxygen Concentration (%): 40 %  Resp: 14      2. INTAKE/ OUTPUT:   I/O last 3 completed shifts:  In: 5223.41 [P.O.:190; I.V.:4142.41; NG/GT:30]  Out: 3403 [Urine:1255; Emesis/NG output:75; Drains:2073]    0.54 cc/kg/hr urine output       3. PHYSICAL EXAMINATION:   General: lying in bed awake, comfortable  Neuro: following commands, answering questions appropriately  Resp: Breathing non-labored, lungs clear to auscultation  CV: RRR, no extra sounds  Abdomen: Soft, non-distended, non-tender to  palpation  Incisions: dressing intact and dry, changed 3/5, no serosanguinous drainage seen  Extremities: warm and well perfused, peripheral pulses (radial, DP, PT) palpable, 1+ peripheral edema.      4. INVESTIGATIONS:   Arterial Blood Gases     Recent Labs  Lab 03/06/17 0329 03/05/17 1707 03/05/17  1118 03/05/17  0358   PH 7.40 7.39 7.40 7.40   PCO2 34* 34* 33* 34*   PO2 97 82 100 172*   HCO3 21 21 21 21     Complete Blood Count     Recent Labs  Lab 03/06/17 0329 03/05/17 2217 03/05/17  1016 03/05/17  0358 03/04/17 2037   WBC 4.3 3.1*  --  2.6* 12.5*   HGB 8.4* 8.1* 7.6* 8.0* 10.4*   PLT 23* 22*  --  29* 100*     Basic Metabolic Panel    Recent Labs  Lab 03/06/17 0329 03/05/17 1707 03/05/17  1016 03/05/17  0358    139 140 140   POTASSIUM 3.7 3.6 3.7 3.9   CHLORIDE 102 105 108 106   CO2 22 22 21 20   * 98* 95* 91*   CR 3.28* 2.98* 2.97* 2.71*   * 204* 108* 240*     Liver Function Tests    Recent Labs  Lab 03/06/17 0329 03/05/17  1016 03/05/17  0358 03/04/17 2037 03/04/17  0626   *  --  680* 1010*  --  130*   *  --  329* 497*  --  90*   ALKPHOS 69  --  72 114  --  222*   BILITOTAL 5.2*  --  13.4* 19.9*  --  37.2*   ALBUMIN 2.4*  --  2.6* 2.6*  --  3.1*   INR 1.37* 1.45* 1.62* 1.94*  < > 1.67*   < > = values in this interval not displayed.  Pancreatic Enzymes    Recent Labs  Lab 03/05/17  0358 03/03/17  1458   LIPASE 216  --    AMYLASE 53 70     Coagulation Profile    Recent Labs  Lab 03/06/17 0329 03/05/17  1016 03/05/17  0358 03/04/17 2037 03/04/17  1950   INR 1.37* 1.45* 1.62* 1.94* 1.81*   PTT 31 36 37  --  42*         5. RADIOLOGY:   Recent Results (from the past 24 hour(s))   US Liver Transplant Portable    Narrative    EXAMINATION: US LIVER TRANSPLANT PORTABLE, 3/5/2017 9:50 AM     COMPARISON: None.    HISTORY: Postop day one liver transplant.    TECHNIQUE:  Gray-scale, color Doppler and spectral flow analysis.    FINDINGS:   There is no ascites.    Liver:   The  liver demonstrates normal homogeneous echotexture. No  evidence of a focal hepatic mass. There are 2 perihepatic collections  including: 2.6 x 5.5 x 1.6 cm collection along the anterior superior  left liver margin. 3.0 x 2.2 x 1.0 cm collection along the posterior  lateral right liver margin.     Bile Ducts: Both the intra- and extrahepatic biliary system are of  normal caliber.  The common bile duct measures 3 mm in diameter.    Gallbladder: The gallbladder is surgically absent.    Kidneys:   Right kidney:  The right kidney demonstrates normal echotexture with  no evidence of a shadowing stone, focal mass or hydronephrosis.   14.5  cm in long axis dimension.  Left kidney: Not visualized.    Pancreas: Obscured    Spleen:  The spleen is enlarged, measuring 19.1 cm.    Visualized portions of the aorta are unremarkable.    LIVER DOPPLER:  Splenic vein:  Patent continuous normal antegrade direction flow  towards the liver, 49 cm/sec.  Extrahepatic portal vein:  Patent continuous antegrade flow, 68  cm/sec.  Portal vein at anastomosis: Patent continuous antegrade flow, 178  cm/sec. Spectral widening.  Intrahepatic portal vein:  Patent continuous antegrade flow, 152  cm/sec.  Right portal vein flow is antegrade, measuring 87 cm/sec.  Left portal vein flow is antegrade, measuring 55 cm/sec.    Inferior vena cava: patent with flow toward the heart throughout..  IVC above anastomosis:  168 cm/sec.  IVC at anastomosis:  46 cm/sec.  Intrahepatic IVC:  58 cm/sec.  Extrahepatic IVC:    31  cm/sec.    Right, mid, left hepatic veins: Patent with flow towards the inferior  vena cava.    Extrahepatic hepatic artery: Not well seen. High resistant waveforms  with flow towards the liver. 37/0 cm/sec with resistive index 1.0.  Right hepatic artery: Not visualized   Left hepatic artery: Not visualized       Impression    Impression:   1.  Unremarkable grayscale appearance of the transplant liver. There  are 2 perihepatic hematomas along  the anterior superior left liver  margin and posterior lateral right liver margin.  2.  Intrahepatic arteries are not visualized. Poor visualization of  the extrahepatic artery which demonstrates high resistant waveform  with resistive index of 1.0 may be related to technique. Recommend  clinical correlation and short-term follow-up with Doppler ultrasound  or possibly CTA. Mildly elevated velocity at the main portal vein  anastomosis measuring 178 cm/, likely related to postoperative edema.  Hepatic veins are patent.    Findings discussed with Dr. Cross at 10:20 AM, 3/5/2017.    I have personally reviewed the examination and initial interpretation  and I agree with the findings.    BASILIO SAGASTUME MD   US Liver Transplant Follow Up Portable    Narrative    EXAMINATION: US LIVER TRANSPLANT FOLLOW UP PORTABLE, 3/5/2017 12:38 PM      COMPARISON: Same day Doppler ultrasound..    HISTORY: Evaluate arterial flow to the transfer liver    TECHNIQUE: Thyroid Doppler color and spectral flow analysis of the  hepatic arteries.    FINDINGS:   Main portal vein demonstrates turbulent flow at the anastomosis.    Left hepatic artery and right hepatic artery are not visualized. Main  hepatic artery is poorly visualized and continues to demonstrate high  resistant waveforms, unchanged since prior study.      Impression    Impression:   Left and right hepatic arteries are not visualized. Main hepatic  artery is poorly visualized and continues to demonstrate high  resistant waveforms.     Findings discussed with Dr. Cross at 1:30 PM, 3/5/2017.    I have personally reviewed the examination and initial interpretation  and I agree with the findings.    BASILIO SAGASTUME MD   CT Abdomen/Pelvis Angio wo & w Contrast    Narrative    EXAMINATION: CTA ANGIOGRAM ABDOMEN PELVIS, 3/5/2017 4:21 PM    TECHNIQUE:  Helical CT images from the lung bases through the  symphysis pubis were obtained without and with IV contrast.     COMPARISON:  Same day ultrasound, pretreatment MRI abdomen 1/6/2017    HISTORY: evaluate patency of hepatic artery. POD1 liver transplant    FINDINGS:    Abdomen and pelvis:  Post surgical changes of liver transplant. Main hepatic artery is  patent at the level of the anastomosis, adjacent to numerous surgical  clips. The left hepatic artery is patent and well-opacified. The right  hepatic artery is not is well opacified although appears patent at the  liver hilum, extending along right portal vein, and not visualized  peripherally. Native portal vein is distended and patent with caliber  change is noted at the level of anastomosis which appears widely  patent. Transplant main portal vein as well as right and left portal  vein are patent. Diffuse periportal and mesenteric edema. Right upper  abdominal drain with the tip at the posterior right liver lobe.  Moderate postoperative ascites and gas in the abdomen and pelvis,  likely postoperative. There is curvilinear lucency along the posterior  medial right liver dome, likely represents a clotting sponge.     Marked splenomegaly measuring 20.8 cm. Prominent portosystemic  collaterals including gastroesophageal varices.    Presumably biliary stent is seen in the third portion of duodenum. No  dilated bowel loops suggest obstruction. Decreased enhancement of the  renal parenchyma. No hydronephrosis or hydroureter. Pancreas and  adrenal glands are unremarkable. Bladder is decompressed by Crowe  catheter.    Major abdominal and pelvic vessels are patent. Abdominal aorta is  normal in caliber. Infrarenal IVC filter.    Bulky periportal and portacaval lymph nodes, unchanged since prior  study.    Lung bases: Bibasilar atelectasis mild lower lobe predominant  bronchiectasis.    Bones and soft tissues: Collateral vessels along the anterior  abdominal wall. Mild anasarca. No pathologic appearing bony lesions.      Impression    IMPRESSION:   1. Post surgical changes of liver transplant.  Main hepatic artery,  left hepatic artery and central right hepatic artery are patent.  Relative decreased opacification of the right hepatic artery as  compared to the left, presumably related to vasospasm. Main portal  vein along with right and left portal veins are patent.  2. Postoperative gas and ascites in the abdomen. Curvilinear lucency  along the posterior medial right liver dome, presumably represents a  clotting sponge.  3. Biliary stent is seen in the third portion of duodenum.  4. Decreased enhancement of the renal parenchyma, compatible with  known acute on chronic renal disease. Sequela of portal hypertension  including marked splenomegaly and prominent portosystemic collaterals,  including gastric esophageal varices.    Findings discussed with Dr. Cross and Dr. Tran at 5:10 PM,  3/5/2017.    I have personally reviewed the examination and initial interpretation  and I agree with the findings.    BLANCA HUYNH MD       =========================================

## 2017-03-06 NOTE — PROGRESS NOTES
03/06/17 1551   Quick Adds   Type of Visit PT Re-evaluation   Living Environment   Lives With (see initial eval for PLOF, self-care, living environment)   General Information   Onset of Illness/Injury or Date of Surgery - Date 02/21/17   Referring Physician Adonay Cross MD   Patient/Family Goals Statement To feel better.    Pertinent History of Current Problem (include personal factors and/or comorbidities that impact the POC) 51M who presents POD#2 OLT. 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    Precautions/Limitations fall precautions;abdominal precautions   General Info Comments activity:ambulate with assist   Cognitive Status Examination   Level of Consciousness alert   Follows Commands and Answers Questions 100% of the time   Personal Safety and Judgment intact   Pain Assessment   Patient Currently in Pain Yes, see Vital Sign flowsheet   Posture    Posture Comments reduced upright posture post-op   Range of Motion (ROM)   ROM Comment less than full hip AROM flexion due to abd pain, UE AROM WFL   Strength   Strength Comments reduced functionally, see below   Bed Mobility   Bed Mobility Comments CGA-DEBORAH rolling, MODA-MAXA supine>sit.    Transfer Skills   Transfer Comments DEBORAH sit<>stand with FWW, 1-2   Gait   Gait Comments Pt ambulated from bedside to commode and later to chair ~ 1 ft each way, with no major imbalance with FWW use. CGA-DEBORAH required. Reduced upright posture noted   Balance   Balance Comments less stable, benefits from B UE support from walker with standing static and dynamic balance tasks   Coordination   Coordination Comments no major concerns   General Therapy Interventions   Planned Therapy Interventions bed mobility training;gait training;neuromuscular re-education;ROM;strengthening;stretching;transfer  "training;progressive activity/exercise;home program guidelines   Clinical Impression   Criteria for Skilled Therapeutic Intervention yes, treatment indicated   PT Diagnosis reduced functional strength post-op   Influenced by the following impairments reduced ROM, balance, strength, activity tolerance   Functional limitations due to impairments below baseline bed mobility, transfers, standing static and dynamic balance, activity tolerance   Clinical Presentation Evolving/Changing   Clinical Presentation Rationale post-op recovery as pt under ICU care, pt has complex PMH   Clinical Decision Making (Complexity) Low complexity   Therapy Frequency` other (see comments)  (6x/wk)   Predicted Duration of Therapy Intervention (days/wks) 1 week   Anticipated Discharge Disposition Transitional Care Facility   Risk & Benefits of therapy have been explained Yes   Patient, Family & other staff in agreement with plan of care Yes   [facility] AM-PAC  \"6 Clicks\" V.2 Basic Mobility Inpatient Short Form   1. Turning from your back to your side while in a flat bed without using bedrails? 3 - A Little   2. Moving from lying on your back to sitting on the side of a flat bed without using bedrails? 2 - A Lot   3. Moving to and from a bed to a chair (including a wheelchair)? 3 - A Little   4. Standing up from a chair using your arms (e.g., wheelchair, or bedside chair)? 3 - A Little   5. To walk in hospital room? 3 - A Little   6. Climbing 3-5 steps with a railing? 2 - A Lot   Basic Mobility Raw Score (Score out of 24.Lower scores equate to lower levels of function) 16   Total Evaluation Time   Total Evaluation Time (Minutes) 8     "

## 2017-03-06 NOTE — PLAN OF CARE
Problem: Goal Outcome Summary  Goal: Goal Outcome Summary  PT 4A: PT re-eval complete s/p transplant (DDOLT on 3/4/17). Provided education and reinforcement pertaining to abdominal precautions. Pain meds required prior to activity to reduce pt's post-op abdominal pain. Pt participated in bed>commode>chair transfer with DEBORAH x 1-2, FWW. Pt declined gait due to significant fatigue. VSS. No orthostatic hypotension noted with positional changes.     Given current deficits, anticipate need for TCU pending progress.

## 2017-03-06 NOTE — PLAN OF CARE
Problem: Goal Outcome Summary  Goal: Goal Outcome Summary  Outcome: Improving  Pt s/p liver transplant. Denies pain. A+O x2 with intermittent confusion. Able to move all extremities. PERRL. NSR on cardiac monitor. BP stable. Sating upper 90's on RA. NPO, tolerates sips of water well. Blood sugar stable on 2 units insulin drip. No stool. Crowe with adequate UOP. Abdominal surgical incision dressing is CDI. Right abdomen TABITHA with lots of serous drainage. Platelets 22 this morning. MD aware no new orders. Hgb 8.4. Plan: see flow sheet for detailed assessments and interventions, continue to support POC.

## 2017-03-06 NOTE — PLAN OF CARE
Problem: Goal Outcome Summary  Goal: Goal Outcome Summary  PT 4A: Cx, pt recently taken to thoracentesis per RN. Pt unavailable at time of eval attempt. Will re-attempt later as schedule permits and pt able to participate.

## 2017-03-06 NOTE — PROGRESS NOTES
Nutrition Progress Note - f/u for progress towards previous nutrition POC and need for FT/TF (see previous 3/2 reassessment for details)    -Diet: NPO (note pt was intubated 3/4-3/5)  -Enteral access: none (lost OGT)  -EN: Peptamen 1.5 @ goal 65 ml/hr via OGT    Interventions:  Collaboration and Referral of Nutrition care - Discussed plan for FEN/GI on rounds with MDs (both SICU and Transplant) as well as patient who reports he would like to avoid a FT/TFs and willing to just eat whatever he needs to to do this.  Discussed GI hx (evidence of ileus on previous AXR most recently 3/3) but noted pt's abd is soft today and pt c/o hunger.  MDs ordered SLP consult with no plans to pursue FT at this time.  If/when cleared from SLP standpoint for diet without need for restrictions, rec to MDs to ADAT to liberalized Regular diet and order supplements (noted in past has consumed Nepro or apple/berry Ensure Clear both between and with meals) as well as Calorie Counts to help evaluate need to still proceed with FT/TF if pt unable to take adequate intakes.    Janice Christianson ,RD, LD, CNSC (pgr [phone])       ADDENDUM:   Notified that pt passed SLP eval with no need for texture/liquid restrictions.  MDs ordered Regular diet (as long as K+ remains WNL).  Noted K+ 3.7 but Phos 7 and /Cr 3.3 mg/dL (today) and Nephrology following for possible need for dialysis post-transplant.  This writer additionally ordered Nepro BID between meals initially for low K+/Phos at this time as well as initiated calorie counts per above recs and post Liver Transplant protocols.    Janice Christianson, RD, LD, CNSC (pgr [phone])

## 2017-03-07 ENCOUNTER — APPOINTMENT (OUTPATIENT)
Dept: PHYSICAL THERAPY | Facility: CLINIC | Age: 52
DRG: 005 | End: 2017-03-07
Payer: COMMERCIAL

## 2017-03-07 LAB
ALBUMIN SERPL-MCNC: 2.5 G/DL (ref 3.4–5)
ALP SERPL-CCNC: 80 U/L (ref 40–150)
ALT SERPL W P-5'-P-CCNC: 160 U/L (ref 0–70)
ANION GAP SERPL CALCULATED.3IONS-SCNC: 14 MMOL/L (ref 3–14)
AST SERPL W P-5'-P-CCNC: 56 U/L (ref 0–45)
BACTERIA SPEC CULT: ABNORMAL
BASOPHILS # BLD AUTO: 0 10E9/L (ref 0–0.2)
BASOPHILS NFR BLD AUTO: 0 %
BILIRUB DIRECT SERPL-MCNC: 4.1 MG/DL (ref 0–0.2)
BILIRUB SERPL-MCNC: 4.8 MG/DL (ref 0.2–1.3)
BLD PROD TYP BPU: NORMAL
BLD UNIT ID BPU: 0
BLOOD PRODUCT CODE: NORMAL
BPU ID: NORMAL
BUN SERPL-MCNC: 117 MG/DL (ref 7–30)
CALCIUM SERPL-MCNC: 8 MG/DL (ref 8.5–10.1)
CHLORIDE SERPL-SCNC: 97 MMOL/L (ref 94–109)
CO2 SERPL-SCNC: 22 MMOL/L (ref 20–32)
CREAT SERPL-MCNC: 3.21 MG/DL (ref 0.66–1.25)
DIFFERENTIAL METHOD BLD: ABNORMAL
EOSINOPHIL # BLD AUTO: 0.1 10E9/L (ref 0–0.7)
EOSINOPHIL NFR BLD AUTO: 0.9 %
ERYTHROCYTE [DISTWIDTH] IN BLOOD BY AUTOMATED COUNT: 17.2 % (ref 10–15)
GFR SERPL CREATININE-BSD FRML MDRD: 20 ML/MIN/1.7M2
GLUCOSE SERPL-MCNC: 131 MG/DL (ref 70–99)
HCT VFR BLD AUTO: 24 % (ref 40–53)
HGB BLD-MCNC: 8.5 G/DL (ref 13.3–17.7)
IMM GRANULOCYTES # BLD: 0 10E9/L (ref 0–0.4)
IMM GRANULOCYTES NFR BLD: 0.4 %
INR PPP: 1.24 (ref 0.86–1.14)
LYMPHOCYTES # BLD AUTO: 0.5 10E9/L (ref 0.8–5.3)
LYMPHOCYTES NFR BLD AUTO: 9.1 %
MAGNESIUM SERPL-MCNC: 2.4 MG/DL (ref 1.6–2.3)
MCH RBC QN AUTO: 32.4 PG (ref 26.5–33)
MCHC RBC AUTO-ENTMCNC: 35.4 G/DL (ref 31.5–36.5)
MCV RBC AUTO: 92 FL (ref 78–100)
MICRO REPORT STATUS: ABNORMAL
MICROORGANISM SPEC CULT: ABNORMAL
MONOCYTES # BLD AUTO: 0.2 10E9/L (ref 0–1.3)
MONOCYTES NFR BLD AUTO: 4 %
NEUTROPHILS # BLD AUTO: 4.5 10E9/L (ref 1.6–8.3)
NEUTROPHILS NFR BLD AUTO: 85.6 %
NRBC # BLD AUTO: 0 10*3/UL
NRBC BLD AUTO-RTO: 0 /100
PHOSPHATE SERPL-MCNC: 5.9 MG/DL (ref 2.5–4.5)
PLATELET # BLD AUTO: 26 10E9/L (ref 150–450)
POTASSIUM SERPL-SCNC: 3.7 MMOL/L (ref 3.4–5.3)
PROT SERPL-MCNC: 4.7 G/DL (ref 6.8–8.8)
RBC # BLD AUTO: 2.62 10E12/L (ref 4.4–5.9)
SODIUM SERPL-SCNC: 133 MMOL/L (ref 133–144)
SPECIMEN SOURCE: ABNORMAL
TACROLIMUS BLD-MCNC: ABNORMAL UG/L (ref 5–15)
TME LAST DOSE: ABNORMAL H
TRANSFUSION STATUS PATIENT QL: NORMAL
WBC # BLD AUTO: 5.3 10E9/L (ref 4–11)

## 2017-03-07 PROCEDURE — 25000132 ZZH RX MED GY IP 250 OP 250 PS 637: Performed by: PHYSICIAN ASSISTANT

## 2017-03-07 PROCEDURE — 25000128 H RX IP 250 OP 636: Performed by: TRANSPLANT SURGERY

## 2017-03-07 PROCEDURE — 25000132 ZZH RX MED GY IP 250 OP 250 PS 637: Performed by: SURGERY

## 2017-03-07 PROCEDURE — 80053 COMPREHEN METABOLIC PANEL: CPT | Performed by: PHYSICIAN ASSISTANT

## 2017-03-07 PROCEDURE — 40000193 ZZH STATISTIC PT WARD VISIT

## 2017-03-07 PROCEDURE — 25000131 ZZH RX MED GY IP 250 OP 636 PS 637: Performed by: PHYSICIAN ASSISTANT

## 2017-03-07 PROCEDURE — 25000132 ZZH RX MED GY IP 250 OP 250 PS 637: Performed by: TRANSPLANT SURGERY

## 2017-03-07 PROCEDURE — 25000128 H RX IP 250 OP 636: Performed by: STUDENT IN AN ORGANIZED HEALTH CARE EDUCATION/TRAINING PROGRAM

## 2017-03-07 PROCEDURE — 80197 ASSAY OF TACROLIMUS: CPT | Performed by: PHYSICIAN ASSISTANT

## 2017-03-07 PROCEDURE — 85025 COMPLETE CBC W/AUTO DIFF WBC: CPT | Performed by: PHYSICIAN ASSISTANT

## 2017-03-07 PROCEDURE — 97530 THERAPEUTIC ACTIVITIES: CPT | Mod: GP

## 2017-03-07 PROCEDURE — 25000132 ZZH RX MED GY IP 250 OP 250 PS 637: Performed by: INTERNAL MEDICINE

## 2017-03-07 PROCEDURE — 82248 BILIRUBIN DIRECT: CPT | Performed by: PHYSICIAN ASSISTANT

## 2017-03-07 PROCEDURE — 36592 COLLECT BLOOD FROM PICC: CPT | Performed by: PHYSICIAN ASSISTANT

## 2017-03-07 PROCEDURE — 83735 ASSAY OF MAGNESIUM: CPT | Performed by: PHYSICIAN ASSISTANT

## 2017-03-07 PROCEDURE — 25000131 ZZH RX MED GY IP 250 OP 636 PS 637: Performed by: TRANSPLANT SURGERY

## 2017-03-07 PROCEDURE — 85610 PROTHROMBIN TIME: CPT | Performed by: PHYSICIAN ASSISTANT

## 2017-03-07 PROCEDURE — 25000132 ZZH RX MED GY IP 250 OP 250 PS 637: Performed by: STUDENT IN AN ORGANIZED HEALTH CARE EDUCATION/TRAINING PROGRAM

## 2017-03-07 PROCEDURE — 84100 ASSAY OF PHOSPHORUS: CPT | Performed by: PHYSICIAN ASSISTANT

## 2017-03-07 PROCEDURE — 25000125 ZZHC RX 250: Performed by: TRANSPLANT SURGERY

## 2017-03-07 PROCEDURE — 12000024 ZZH R&B TRANSPLANT CRITICAL

## 2017-03-07 RX ORDER — AMOXICILLIN 250 MG
2 CAPSULE ORAL 2 TIMES DAILY
Status: DISCONTINUED | OUTPATIENT
Start: 2017-03-07 | End: 2017-03-10 | Stop reason: HOSPADM

## 2017-03-07 RX ORDER — CYCLOBENZAPRINE HCL 10 MG
10 TABLET ORAL 3 TIMES DAILY PRN
Status: DISCONTINUED | OUTPATIENT
Start: 2017-03-07 | End: 2017-03-10 | Stop reason: HOSPADM

## 2017-03-07 RX ORDER — NICOTINE POLACRILEX 4 MG
15-30 LOZENGE BUCCAL
Status: DISCONTINUED | OUTPATIENT
Start: 2017-03-07 | End: 2017-03-07

## 2017-03-07 RX ORDER — OXYCODONE HYDROCHLORIDE 5 MG/1
5-10 TABLET ORAL EVERY 4 HOURS PRN
Status: DISCONTINUED | OUTPATIENT
Start: 2017-03-07 | End: 2017-03-10 | Stop reason: HOSPADM

## 2017-03-07 RX ORDER — DEXTROSE MONOHYDRATE 25 G/50ML
25-50 INJECTION, SOLUTION INTRAVENOUS
Status: DISCONTINUED | OUTPATIENT
Start: 2017-03-07 | End: 2017-03-07

## 2017-03-07 RX ORDER — TACROLIMUS 1 MG/1
2 CAPSULE ORAL
Status: DISCONTINUED | OUTPATIENT
Start: 2017-03-07 | End: 2017-03-08

## 2017-03-07 RX ADMIN — CYCLOBENZAPRINE HYDROCHLORIDE 10 MG: 10 TABLET, FILM COATED ORAL at 18:24

## 2017-03-07 RX ADMIN — METHYLPREDNISOLONE SODIUM SUCCINATE 50 MG: 125 INJECTION, POWDER, LYOPHILIZED, FOR SOLUTION INTRAMUSCULAR; INTRAVENOUS at 07:42

## 2017-03-07 RX ADMIN — CLOTRIMAZOLE 1 TROCHE: 10 LOZENGE ORAL at 12:51

## 2017-03-07 RX ADMIN — CLOTRIMAZOLE 1 TROCHE: 10 LOZENGE ORAL at 19:46

## 2017-03-07 RX ADMIN — HUMAN INSULIN 10 UNITS/HR: 100 INJECTION, SOLUTION SUBCUTANEOUS at 14:08

## 2017-03-07 RX ADMIN — HYDROMORPHONE HYDROCHLORIDE 0.3 MG: 10 INJECTION, SOLUTION INTRAMUSCULAR; INTRAVENOUS; SUBCUTANEOUS at 08:13

## 2017-03-07 RX ADMIN — OXYCODONE HYDROCHLORIDE 10 MG: 5 TABLET ORAL at 12:51

## 2017-03-07 RX ADMIN — HYDROMORPHONE HYDROCHLORIDE 0.5 MG: 10 INJECTION, SOLUTION INTRAMUSCULAR; INTRAVENOUS; SUBCUTANEOUS at 00:27

## 2017-03-07 RX ADMIN — TACROLIMUS 1 MG: 1 CAPSULE, GELATIN COATED ORAL at 07:42

## 2017-03-07 RX ADMIN — MULTIPLE VITAMINS W/ MINERALS TAB 1 TABLET: TAB at 07:42

## 2017-03-07 RX ADMIN — OXYCODONE HYDROCHLORIDE 10 MG: 5 TABLET ORAL at 09:18

## 2017-03-07 RX ADMIN — SULFAMETHOXAZOLE AND TRIMETHOPRIM 1 TABLET: 400; 80 TABLET ORAL at 07:42

## 2017-03-07 RX ADMIN — HYDROMORPHONE HYDROCHLORIDE 0.2 MG: 10 INJECTION, SOLUTION INTRAMUSCULAR; INTRAVENOUS; SUBCUTANEOUS at 08:56

## 2017-03-07 RX ADMIN — SODIUM BICARBONATE 650 MG TABLET 1300 MG: at 19:46

## 2017-03-07 RX ADMIN — OMEPRAZOLE 20 MG: 20 CAPSULE, DELAYED RELEASE ORAL at 07:42

## 2017-03-07 RX ADMIN — ASPIRIN 325 MG ORAL TABLET 325 MG: 325 PILL ORAL at 07:42

## 2017-03-07 RX ADMIN — HUMAN INSULIN: 100 INJECTION, SOLUTION SUBCUTANEOUS at 20:00

## 2017-03-07 RX ADMIN — DIPHENHYDRAMINE HYDROCHLORIDE 50 MG: 25 CAPSULE ORAL at 19:46

## 2017-03-07 RX ADMIN — SENNOSIDES AND DOCUSATE SODIUM 2 TABLET: 8.6; 5 TABLET ORAL at 19:46

## 2017-03-07 RX ADMIN — HUMAN INSULIN 4 UNITS/HR: 100 INJECTION, SOLUTION SUBCUTANEOUS at 09:25

## 2017-03-07 RX ADMIN — MYCOPHENOLATE MOFETIL 1000 MG: 250 CAPSULE ORAL at 07:42

## 2017-03-07 RX ADMIN — HUMAN INSULIN: 100 INJECTION, SOLUTION SUBCUTANEOUS at 16:32

## 2017-03-07 RX ADMIN — TACROLIMUS 2 MG: 1 CAPSULE ORAL at 18:07

## 2017-03-07 RX ADMIN — MICONAZOLE NITRATE: 2 POWDER TOPICAL at 07:50

## 2017-03-07 RX ADMIN — HUMAN INSULIN: 100 INJECTION, SOLUTION SUBCUTANEOUS at 21:55

## 2017-03-07 RX ADMIN — CYCLOBENZAPRINE HYDROCHLORIDE 10 MG: 10 TABLET, FILM COATED ORAL at 09:49

## 2017-03-07 RX ADMIN — OXYCODONE HYDROCHLORIDE 10 MG: 5 TABLET ORAL at 16:18

## 2017-03-07 RX ADMIN — MICONAZOLE NITRATE: 20 CREAM TOPICAL at 07:50

## 2017-03-07 RX ADMIN — SODIUM BICARBONATE 650 MG TABLET 1300 MG: at 07:42

## 2017-03-07 RX ADMIN — MYCOPHENOLATE MOFETIL 1000 MG: 250 CAPSULE ORAL at 18:07

## 2017-03-07 NOTE — PROGRESS NOTES
03/06/17 1119   Quick Adds   Type of Visit Occupational Therapy Re-evaluation   Living Environment   Lives With child(gisell), adult;spouse   Living Arrangements house   Home Accessibility tub/shower is not walk in   Number of Stairs to Enter Home 4   Number of Stairs Within Home 0   Stair Railings at Home outside, present at both sides;inside, present on right side   Transportation Available family or friend will provide;car   Self-Care   Dominant Hand right   Usual Activity Tolerance good   Current Activity Tolerance moderate   Regular Exercise no   Activity/Exercise/Self-Care Comment Please see initial OT eval for detailed home and PLOF information.    Functional Level Prior   Ambulation 0-->independent   Transferring 0-->independent   Toileting 0-->independent   Bathing 0-->independent   Dressing 0-->independent   Eating 0-->independent   Communication 0-->understands/communicates without difficulty   Swallowing 0-->swallows foods/liquids without difficulty   Cognition 0 - no cognition issues reported   Which of the above functional risks had a recent onset or change? ambulation;transferring;toileting;bathing;dressing;cognition   Prior Functional Level Comment Please see initial OT eval for detailed home and PLOF information.    General Information   Onset of Illness/Injury or Date of Surgery - Date 03/04/17   Referring Physician Ada Driver PA-C   Patient/Family Goals Statement Pt would like to get home   Additional Occupational Profile Info/Pertinent History of Current Problem POD 2 liver transplant   Precautions/Limitations fall precautions;abdominal precautions   Cognitive Status Examination   Orientation person;place   Level of Consciousness alert   Able to Follow Commands WNL/WFL   Personal Safety (Cognitive) mild impairment   Memory impaired   Attention Selective attention impaired, difficulty ignoring irrelevant stimuli;Alternating attention impaired, difficulty shifting between tasks;Divided  attention impaired, difficulty with simultaneous tasks   Cognitive Comment Pt scored    Visual Perception   Visual Perception No deficits were identified   Pain Assessment   Patient Currently in Pain Yes, see Vital Sign flowsheet   Posture   Posture forward head position   Range of Motion (ROM)   ROM Comment BUE WFL   Strength   Strength Comments BUE grossly 4/5   Hand Strength   Hand Strength Comments moderate grasp strength bilaterally   Coordination   Upper Extremity Coordination Left UE impaired;Right UE impaired   Functional Limitations Decreased speed;Fine motor ADL performance impaired   Mobility   Bed Mobility Bed mobility skill: Sit to supine   Bed Mobility Skill: Sit to Supine   Level of [place]: Sit/Supine minimum assist (75% patients effort)   Transfer Skill: Bed to Chair/Chair to Bed   Level of [place]: Bed to Chair stand-by assist   Transfer Skill: Sit to Stand   Level of [place]: Sit/Stand minimum assist (75% patients effort)   Assistive Device for Transfer: Sit/Stand standard walker   Balance   Balance Quick Add Sitting balance: static;Sitting balance: dynamic;Standing balance: static;Standing balance: dynamic   Sitting Balance: Static good balance   Sitting Balance: Dynamic fair balance   Standing Balance: Static fair balance   Standing Balance: Dynamic poor balance   Lower Body Dressing   Level of [place]: Dress Lower Body dependent (less than 25% patients effort)   Instrumental Activities of Daily Living (IADL)   Previous Responsibilities meal prep;housekeeping;laundry;shopping;medication management;finances;driving   Activities of Daily Living Analysis   Impairments Contributing to Impaired Activities of Daily Living balance impaired;cognition impaired;pain;post surgical precautions;strength decreased   General Therapy Interventions   Planned Therapy Interventions ADL retraining;bed mobility training;IADL retraining;strengthening;transfer training;risk factor education  "  Clinical Impression   Criteria for Skilled Therapeutic Interventions Met yes, treatment indicated   OT Diagnosis Decreased I/ADL independence   Influenced by the following impairments balance impaired;cognition impaired;pain;post surgical precautions;strength decreased   Assessment of Occupational Performance 3-5 Performance Deficits   Identified Performance Deficits balance impaired;cognition impaired;pain;post surgical precautions;strength decreased   Clinical Decision Making (Complexity) Low complexity   Therapy Frequency (6x/week)   Predicted Duration of Therapy Intervention (days/wks) 2 weeks   Anticipated Discharge Disposition Home with Outpatient Therapy   Risks and Benefits of Treatment have been explained. Yes   Patient, Family & other staff in agreement with plan of care Yes   Clinical Impression Comments Anticipate pt will progress to d/c home with assist from family and OP OT   [facility] AM-PAC  \"6 Clicks\" Daily Activity Inpatient Short Form   1. Putting on and taking off regular lower body clothing? 1 - Total   2. Bathing (including washing, rinsing, drying)? 2 - A Lot   3. Toileting, which includes using toilet, bedpan or urinal? 2 - A Lot   4. Putting on and taking off regular upper body clothing? 2 - A Lot   5. Taking care of personal grooming such as brushing teeth? 3 - A Little   6. Eating meals? 3 - A Little   Daily Activity Raw Score (Score out of 24.Lower scores equate to lower levels of function) 13   Total Evaluation Time   Total Evaluation Time (Minutes) 11     "

## 2017-03-07 NOTE — PLAN OF CARE
Problem: Individualization  Goal: Patient Preferences  Outcome: Improving     RN:  AVSS, O2 sat 99% on room air, patient on BiPAP at night time. Back pain controlled with Dilaudid IV, given x1, denies nausea. Blood glucose 100-200's, at present Insulin gtt@1unit/hr. Urethral catheter with adequate urine output, no BM during the shift, TABITHA with moderate amount of serosanguinous output. Patient resting between cares, will continue to monitor.

## 2017-03-07 NOTE — PROGRESS NOTES
Nephrology Progress Note  03/07/2017       Interval History :   Doing well, good UOP, off IVF and eating regular diet. BP adequate. Cr trended down slightly today. LFT's improving. He has some low back pain, no other new complaints.    Assessment & Recommendations:   51yM with hx of CASTAÑEDA cirrhosis/ESLD with portal HTN requiring twice weekly paracentesis, T2DM on insulin, HCC s/p TACE in 9/2016, GERD on PPI, and normal baseline renal function that is admitted with hepatic encephalopathy, hyperglycemia, and decompensated liver disease now with rising Cr.     1. EJ, baseline Cr 0.7-0.8, admission Cr 1.4, acute rise with HRS vs bili cast nephropathy, now s/p liver tx and UOP picking up and Cr down slightly (Cr 3.3 -> 3.2). Got contrast 3/5, on low dose tac for IS.  2. OLT on 3/4 for CASTAÑEDA cirrhosis, LFT's trending down, on IS including MMF and tac, CTA with intact hepatic vasculature  3. Metabolic acidosis, likely 2/2 EJ, improved  4. Anemia, Hgb stable today  5. Hx of HCC s/p TACE 9/2016, repeat MRI with residual tumor but <5cm -> now s/p transplant  6. GERD on PPI  7. Hyperglycemia with T2DM, on insulin gtt     Recs:  1. No need for dialysis at this point  2. No diuretics necessary currently  3. Trend UOP and Cr daily as well as daily weight     Staffed with Dr. Rubio.      Doug Elaine  Nephrology Fellow  Pager: [phone]      Review of Systems:   A 4 point review of systems was negative except as noted above.  Notably: good appetite. no nausea or vomiting or diarrhea.  no confusion no fever or chills    Physical Exam:   /79 (BP Location: Left arm)  Pulse 66  Temp 97.9  F (36.6  C) (Oral)  Resp 16  Ht 1.829 m (6')  Wt 104.8 kg (231 lb 1.6 oz)  SpO2 99%  BMI 31.34 kg/m2     Ins and Outs  Intake/Output Summary (Last 24 hours) at 03/07/17 1613  Last data filed at 03/07/17 1400   Gross per 24 hour   Intake              976 ml   Output             3180 ml   Net            -2204 ml     General: resting  comfortably  HEENT: MMM  Heart: RRR, no murmur  Lungs: CTA anterior and posterior  Abd: abdomen less distended, drain in place and staples over incision, minimal tenderness  Ext: no edema to trace in feet/ankles  Access : no HD access    Labs:   All of today's labs reviewed.    Imaging:  Today's imaging reviewed.    Current Medications:  All Reviewed    Doug Elaine  Nephrology Fellow  Pager: [phone]    I was present with the fellow during the history and exam.  I discussed the case with the fellow and agree with the findings as documented in the assessment and plan.  Jael Rubio

## 2017-03-07 NOTE — PLAN OF CARE
Problem: Goal Outcome Summary  Goal: Goal Outcome Summary  Outcome: Therapy, progress toward functional goals as expected  OT 4A:  OT re-eval completed and POC updated. Supine > sit with min A with instruction provided with log roll technique;  sit <> stand with SBA with instruction provided to prevent straining during exertion;  transfer to chair with SBA and verbal cues for technique. Progressed activity tolerance with ambulation, pt tolerates ambulating for 9 min and approx 75' with FWW and CGA. Instruction provided with use of walker. Pt reports high fatigue post ambulation; 11/10 on the OMNI scale. Recommend discharge home, when medically appropriate, with family assistance and OP OT.

## 2017-03-07 NOTE — PROGRESS NOTES
Patient complained of moderate back pain this morning, unable to sit in the chair or the bed without pain. Oxycodone 10 mg given, Flexeril 10 mg and Aqau K pad with relief. Patient was then able to walk around the unit with stand by assist and walker. Amrita HERNANDEZ'jagjit at 11:30 am. Right TABITHA with moderate output. Central line dressing changed, moderate bloody drainage. Insulin drip at 1-6U/hr, blood sugars labile, carb coverage with breakfast. Passing flatus but no BM.

## 2017-03-07 NOTE — PROGRESS NOTES
"                     Diabetes Consult Daily  Progress Note          Assessment/Plan:   Camacho Bhagat is a 51 year old with uncontrolled type 2 diabetes and with end stage liver disease and listed for transplant, admitted to [facility] 2/21/2017 with worsening encephalopathy and concern for infection. He was experiencing persistent hyperglycemia in the setting of likely omitted aspart, building glucose toxicity, and potentially infection (SBP negative) .   Now s/p DDOLT on (3/4/2017), extubated and working with therapies    BG yesterday Hyperglycemia to 300's from 15:30 - 2400 while on drip, AM glucose excellent control  Methyprednisolone to decrease by 50 % today   BG oriana to 325 today RN states was unsure how to proceed with IV insulin rate. Discussed with her, rate increased to 12 units, she is to follow protocol and call pharmacy if gets\"stuck\" in a bracket  Plan:  Continue on insulin drip, while on methylprednisolone  -speech to assess for oral intake ( TF have not started)  - will add meal insulin if ok for oral intake ( aspart 1 unit per 15 grams of CHO with meals and snacks)   (increase meal aspart to 1 unit per 12 grams of CHO with meals and snacks)  Will continue to follow           Interval History:     On insulin drip, glucose oriana to 325 after methylpred, even with CHO coverage.  RN stated she was concerned about going higher than 10 units per hout and was having trouble locating IV drip protocol for further adjustments Explained will contniue with IV drip until steroid effects on BG manageable with less insulin  Taper schedule for steroids: methylprednisolone 200 mg POD # 1, 100 mg (3/6), 50 mg POD # 3, followed by Prednisone 25 mg POD #4, prednisone 10 mg on POD # 5  Taking PO gradually, no respiratory C/O, voiding without difficulty, afebrile  Discussed with his wife and him plan for Insulin transition as steroids decrease  Working with therapies        Recent Labs  Lab 03/07/17  0734 03/07/17  0633 " 03/07/17  0556 03/07/17  0402 03/07/17  0226 03/07/17  0039 03/06/17  2213  03/06/17  0329  03/05/17  1707  03/05/17  1016  03/05/17  0358  03/04/17 2037   GLC  --  131*  --   --   --   --   --   --  111*  --  204*  --  108*  --  240*  --  205*   *  --  124* 106* 129* 174* 298*  < >  --   < >  --   < >  --   < >  --   < >  --    < > = values in this interval not displayed.            Review of Systems:      please see interval hsitory       Medications:       Active Diet Order      Advance Diet as Tolerated: Regular Diet Adult     Physical Exam:  Gen: Alert,  Was able to stand at bedside, NAD   HEENT: NC/AT, mucous membranes are moist, still jaudice   Resp: Unlabored, no cough  Ext: moving all extremities, mild edema  Neuro:oriented x3, communicating clearly  /72  Pulse 65  Temp 98.3  F (36.8  C)  Resp 16  Ht 1.829 m (6')  Wt 104.8 kg (231 lb 1.6 oz)  SpO2 98%  BMI 31.34 kg/m2           Data:     Lab Results   Component Value Date    A1C 9.4 02/16/2017    A1C 6.2 12/14/2016    A1C 6.1 10/27/2016    A1C 8.3 07/02/2012    A1C 8.5 07/31/2009              CBC RESULTS:   Recent Labs   Lab Test  03/06/17 0329   WBC  4.3   RBC  2.58*   HGB  8.4*   HCT  23.3*   MCV  90   MCH  32.6   MCHC  36.1   RDW  17.4*   PLT  23*     Recent Labs   Lab Test  03/06/17 0329 03/05/17   1707   NA  139  139   POTASSIUM  3.7  3.6   CHLORIDE  102  105   CO2  22  22   ANIONGAP  15*  11   GLC  111*  204*   BUN  103*  98*   CR  3.28*  2.98*   JACOB  7.6*  7.2*     Liver Function Studies -   Recent Labs   Lab Test  03/06/17 0329   PROTTOTAL  4.3*   ALBUMIN  2.4*   BILITOTAL  5.2*   ALKPHOS  69   AST  157*   ALT  209*     Lab Results   Component Value Date    INR 1.37 03/06/2017    INR 1.45 03/05/2017    INR 1.62 03/05/2017    INR 1.94 03/04/2017    INR 1.81 03/04/2017    INR 2.19 03/04/2017    INR 1.84 03/04/2017    INR 1.81 03/04/2017    INR 2.05 03/04/2017    INR 1.67 03/04/2017    INR 1.82 03/03/2017    INR 1.73 03/03/2017            Roselia Bryson, AURE pager [phone]  Diabetes Management Job Code [id number]

## 2017-03-07 NOTE — PROGRESS NOTES
CLINICAL NUTRITION SERVICES    Patient reports fair appetite - ate cream of rice, banana bread and skim milk x 3.      Interventions:  Reviewed high protein needs and sources.  Encouraged consuming at least 2-3 high protein sources/servings per meal.       Changed oral supplement to Ensure Clear per patient's request and preference.      Discussed calorie counts.       RD will continue to follow.     Janice Salomon MS, RD, LD  Pager [phone]

## 2017-03-07 NOTE — PROGRESS NOTES
"  Transplant Surgery  Inpatient Daily Progress Note  03/07/2017    Assessment & Plan: 51M cirrhosis CASTAÑEDA POD#3 OLT    Graft function: Good, improved. POD 1 US unable to visualize HA. CTA performed, main, left and right central HA patent, portal vein patent. LFTs trending down.  Immunosuppression management:   -Simulect and steroids per protocol  -MMF 1 gm BID  -Tacrolimus 1 mg BID continue today. Check level on Today.     Complexity of management:High. Contributing factors: organ dysfunction and new transplant, acute on chronic kidney injury  Hematology: Hgb 8.5, stable. PLT 26 (23). INR 1.24. Start  mg for HA ppx.   Cardiorespiratory: Extubated POD 1. Stable on RA. OOB to chair. Aggressive pulmonary toilet.  GI/Nutrition: Regular diet.  calorie counts. If poor intake, will place NJ and start TF.    Endocrine: hx DM type 2. PTA Lantus 65 units and Novolog SSI. Hyperglycemia 2/2 steroids. On insulin gtt. Carb cover meals. Endocrine team consulting.  Renal/Fluid/Electrolytes: Acute kidney injury. PTA Cr 0.8, admitted with dehydration, \"acute rise HRS vs bilirubin cast nephropathy\" per Nephrology note, Cr 1.4 on admission. Now with acute worsening of EJ due to liver transplant and IV contrast (CTA imaging of HA).   -Cr 3.2 down from 3.3. UO 1.8L. K 3.7. Avoid nephrotoxins.   : Hx of urinary retention. Remove vazquez today, monitor for retention.   Infectious disease: periop antibiotics completed.   Prophylaxis: DVT PCDs,PPI  Activity: Out of bed to chair, ambulate x 4. Continue incentive spirometry.   Disposition: 7A. Start transplant education, have patient sign in on Ipad to my transplant place.     Medical Decision Making: High  Admit [id number] (high level decision making)    PILLO/Fellow/Resident Provider: Ada Driver, PAC 1469    Faculty: Drew Dalal M.D.  _________________________________________________________________  Transplant History: Admitted 2/21/2017 for OLT.  3/4/2017 (Liver), Postoperative day: " 3     Interval History: History is obtained from the patient  Overnight events: Back pain, improved with massage. No  Hx back pain. No flatus or BM. Denies nausea. Tolerated breakfast without issue.     ROS:   A 10-point review of systems was negative except as noted above.    Meds:    micafungin  100 mg Intravenous Q24H     insulin aspart   Subcutaneous TID w/meals     omeprazole  20 mg Oral QAM AC     multivitamin, therapeutic with minerals  1 tablet Oral Daily     tacrolimus  1 mg Oral BID IS     protein modular  1 packet Per Feeding Tube BID     aspirin  325 mg Oral or Feeding Tube Daily     sodium chloride (PF)  3 mL Intravenous Q8H     [START ON 3/8/2017] predniSONE  25 mg Oral Once    Followed by     [START ON 3/9/2017] predniSONE  10 mg Oral Once     valGANciclovir  450 mg Oral Every Other Day    Or     valGANciclovir  450 mg Oral or NG Tube Every Other Day     sulfamethoxazole-trimethoprim  1 tablet Oral Daily     mycophenolate  1,000 mg Oral BID IS    Or     mycophenolate  1,000 mg Oral or NG Tube BID IS     [START ON 3/8/2017] basiliximab (SIMULECT) infusion  20 mg Intravenous Once     sodium bicarbonate  1,300 mg Oral BID     miconazole with skin protectant   Topical BID     miconazole   Topical BID     Topical skin adhesive   Topical Once     gadobutrol  0.1 mL/kg Intravenous Once       Physical Exam:     Admit Weight: 103.9 kg (229 lb) (bed scale)    Current vitals:   /72  Pulse 65  Temp 98.3  F (36.8  C)  Resp 16  Ht 1.829 m (6')  Wt 104.8 kg (231 lb 1.6 oz)  SpO2 98%  BMI 31.34 kg/m2        Vital sign ranges:    Temp:  [97.6  F (36.4  C)-98.6  F (37  C)] 98.3  F (36.8  C)  Heart Rate:  [62-72] 72  Resp:  [16-20] 16  BP: (113-154)/(56-81) 154/72  MAP:  [91 mmHg-94 mmHg] 94 mmHg  Arterial Line BP: (155-159)/(61-65) 159/65  SpO2:  [91 %-100 %] 98 %  Patient Vitals for the past 24 hrs:   BP Temp Temp src Heart Rate Resp SpO2 Weight   03/07/17 0900 - - - - - - 104.8 kg (231 lb 1.6 oz)   03/07/17  0802 154/72 98.3  F (36.8  C) - 72 16 98 % -   03/07/17 0400 129/71 98.6  F (37  C) Oral 64 16 100 % -   03/07/17 0042 124/65 98.2  F (36.8  C) Oral 66 16 99 % -   03/06/17 1900 152/81 97.8  F (36.6  C) Oral 66 16 100 % -   03/06/17 1800 - - - - 16 - -   03/06/17 1700 122/64 - - 64 - 100 % -   03/06/17 1600 121/64 98.6  F (37  C) Oral 71 16 99 % -   03/06/17 1500 113/56 - - 66 - 91 % -   03/06/17 1400 122/71 - - 68 18 98 % -   03/06/17 1300 128/74 - - 64 - 100 % -   03/06/17 1200 123/74 98.4  F (36.9  C) Oral 66 18 100 % -   03/06/17 1133 - - - 64 - 100 % -   03/06/17 1100 135/70 - - 67 20 98 % -   03/06/17 1000 137/72 - - 63 18 99 % -   03/06/17 0945 - - - 62 - 99 % -   03/06/17 0930 - 97.6  F (36.4  C) Oral 63 18 99 % -     General Appearance: in no apparent distress.   Skin: normal, jaundice  Heart:RRR  Lungs: BCTA on RA  Abdomen: The abdomen is flat, and  non-tender. he is not tender over the liver graft. The wound is intact, dry  : vazquez is present.   Urine has no gross hematuria.   Extremities: edema: present bilaterally. 1+  Neurologic: awake. Tremor absent.  CVC    Data:   CMP  Recent Labs  Lab 03/07/17  0633 03/06/17  0329  03/05/17  0358  03/03/17  1458    139  < > 140  < > 131*   POTASSIUM 3.7 3.7  < > 3.9  < > 3.5   CHLORIDE 97 102  < > 106  < > 98   CO2 22 22  < > 20  < > 17*   * 111*  < > 240*  < > 371*   * 103*  < > 91*  < > 99*   CR 3.21* 3.28*  < > 2.71*  < > 2.78*   GFRESTIMATED 20* 20*  < > 25*  < > 24*   GFRESTBLACK 25* 24*  < > 30*  < > 29*   JACOB 8.0* 7.6*  < > 8.1*  < > 8.5   ICAW  --  4.4  --  4.7  < >  --    MAG 2.4* 2.4*  < > 2.2  --  2.7*   PHOS 5.9* 7.0*  < > 5.3*  --  4.6*   AMYLASE  --   --   --  53  --  70   LIPASE  --   --   --  216  --   --    ALBUMIN 2.5* 2.4*  --  2.6*  < > 3.0*   BILITOTAL 4.8* 5.2*  --  13.4*  < > 35.2*   ALKPHOS 80 69  --  72  < > 200*   AST 56* 157*  --  680*  < > 116*   * 209*  --  329*  < > 72*   < > = values in this interval not  displayed.  CBC    Recent Labs  Lab 03/07/17  0633 03/06/17  0329   HGB 8.5* 8.4*   WBC 5.3 4.3   PLT 26* 23*     COAGS    Recent Labs  Lab 03/07/17  0633 03/06/17  1549 03/06/17  0329   INR 1.24*  --  1.37*   PTT  --  27 31      Urinalysis  Recent Labs   Lab Test  03/03/17   1405  03/01/17   0340   04/11/16   1345  02/08/16   1234   COLOR  Dark Yellow  Dark Yellow   < >  Yellow  Yellow   APPEARANCE  Slightly Cloudy  Slightly Cloudy   < >  Clear  Clear   URINEGLC  Negative  Negative   < >  30*  >1000*   URINEBILI  Moderate   This is an unconfirmed screening test result. A positive result may be false.  *  Moderate   This is an unconfirmed screening test result. A positive result may be false.  *   < >  Negative  Negative   URINEKETONE  Negative  Negative   < >  Negative  Negative   SG  1.010  1.011   < >  1.016  1.020   UBLD  Negative  Negative   < >  Small*  Small*   URINEPH  5.0  5.0   < >  5.0  5.0   PROTEIN  Negative  Negative   < >  30*  30*   NITRITE  Negative  Negative   < >  Negative  Negative   LEUKEST  Negative  Negative   < >  Negative  Negative   RBCU  1  <1   < >  1  2   WBCU  0  3*   < >  1  1   UTPG   --    --    --   0.41*  0.33*    < > = values in this interval not displayed.     Virology:  Hepatitis C Antibody   Date Value Ref Range Status   03/03/2017  NR Final    Nonreactive   Assay performance characteristics have not been established for newborns,   infants, and children     I have reviewed history, examined patient and discussed plan with the fellow/resident/PILLO.  I concur with the findings in this note.

## 2017-03-07 NOTE — PLAN OF CARE
Problem: Goal Outcome Summary  Goal: Goal Outcome Summary  PT/7A: Pt limited by back pain during session. Pt able to transfer supine>sit with Magdi to bring trunk up and cuing for body mechanics to maintain abdominal precautions. Pt transferred sit<>stand with FWW and CGA and transferred to chair with FWW and CGA. Pt took several steps forward and lateral with FWW and CGA but could not tolerater much OOB activity d.t severe back pain. PT recommends DC to TCU once medically cleared to improve strength and IND/safety with functional mobility before returning home

## 2017-03-08 ENCOUNTER — APPOINTMENT (OUTPATIENT)
Dept: OCCUPATIONAL THERAPY | Facility: CLINIC | Age: 52
DRG: 005 | End: 2017-03-08
Payer: COMMERCIAL

## 2017-03-08 LAB
A* LOCUS: NORMAL
A*: NORMAL
ABTEST METHOD: NORMAL
ALBUMIN SERPL-MCNC: 2.4 G/DL (ref 3.4–5)
ALP SERPL-CCNC: 86 U/L (ref 40–150)
ALT SERPL W P-5'-P-CCNC: 130 U/L (ref 0–70)
ANION GAP SERPL CALCULATED.3IONS-SCNC: 10 MMOL/L (ref 3–14)
AST SERPL W P-5'-P-CCNC: 49 U/L (ref 0–45)
B* LOCUS: NORMAL
B*: NORMAL
BASOPHILS # BLD AUTO: 0 10E9/L (ref 0–0.2)
BASOPHILS NFR BLD AUTO: 0 %
BILIRUB DIRECT SERPL-MCNC: 3.8 MG/DL (ref 0–0.2)
BILIRUB SERPL-MCNC: 4.8 MG/DL (ref 0.2–1.3)
BUN SERPL-MCNC: 106 MG/DL (ref 7–30)
BW-1: NORMAL
C* LOCUS: NORMAL
CALCIUM SERPL-MCNC: 8 MG/DL (ref 8.5–10.1)
CHLORIDE SERPL-SCNC: 99 MMOL/L (ref 94–109)
CO2 SERPL-SCNC: 24 MMOL/L (ref 20–32)
CREAT SERPL-MCNC: 2.19 MG/DL (ref 0.66–1.25)
DIFFERENTIAL METHOD BLD: ABNORMAL
DPA1*: NORMAL
DPA1*LOCUS: NORMAL
DPB1*: NORMAL
DQA1*: NORMAL
DQA1*LOCUS: NORMAL
DQB1* LOCUS: NORMAL
DQB1*: NORMAL
DRB1* LOCUS: NORMAL
DRB1*: NORMAL
DRB3* LOCUS: NORMAL
DRSSO TEST METHOD: NORMAL
EOSINOPHIL # BLD AUTO: 0.1 10E9/L (ref 0–0.7)
EOSINOPHIL NFR BLD AUTO: 2.4 %
ERYTHROCYTE [DISTWIDTH] IN BLOOD BY AUTOMATED COUNT: 16.5 % (ref 10–15)
GFR SERPL CREATININE-BSD FRML MDRD: 32 ML/MIN/1.7M2
GLUCOSE SERPL-MCNC: 116 MG/DL (ref 70–99)
HCT VFR BLD AUTO: 24.2 % (ref 40–53)
HGB BLD-MCNC: 8.4 G/DL (ref 13.3–17.7)
IMM GRANULOCYTES # BLD: 0 10E9/L (ref 0–0.4)
IMM GRANULOCYTES NFR BLD: 0.2 %
INR PPP: 1.16 (ref 0.86–1.14)
LYMPHOCYTES # BLD AUTO: 0.6 10E9/L (ref 0.8–5.3)
LYMPHOCYTES NFR BLD AUTO: 11.5 %
MAGNESIUM SERPL-MCNC: 2.2 MG/DL (ref 1.6–2.3)
MCH RBC QN AUTO: 31.7 PG (ref 26.5–33)
MCHC RBC AUTO-ENTMCNC: 34.7 G/DL (ref 31.5–36.5)
MCV RBC AUTO: 91 FL (ref 78–100)
MONOCYTES # BLD AUTO: 0.1 10E9/L (ref 0–1.3)
MONOCYTES NFR BLD AUTO: 2.6 %
NEUTROPHILS # BLD AUTO: 4.4 10E9/L (ref 1.6–8.3)
NEUTROPHILS NFR BLD AUTO: 83.3 %
NRBC # BLD AUTO: 0 10*3/UL
NRBC BLD AUTO-RTO: 1 /100
PHOSPHATE SERPL-MCNC: 3.7 MG/DL (ref 2.5–4.5)
PLATELET # BLD AUTO: 35 10E9/L (ref 150–450)
POTASSIUM SERPL-SCNC: 3.9 MMOL/L (ref 3.4–5.3)
PROT SERPL-MCNC: 4.7 G/DL (ref 6.8–8.8)
RBC # BLD AUTO: 2.65 10E12/L (ref 4.4–5.9)
SODIUM SERPL-SCNC: 133 MMOL/L (ref 133–144)
TACROLIMUS BLD-MCNC: ABNORMAL UG/L (ref 5–15)
TME LAST DOSE: ABNORMAL H
WBC # BLD AUTO: 5.3 10E9/L (ref 4–11)

## 2017-03-08 PROCEDURE — 97535 SELF CARE MNGMENT TRAINING: CPT | Mod: GO | Performed by: OCCUPATIONAL THERAPIST

## 2017-03-08 PROCEDURE — 40000133 ZZH STATISTIC OT WARD VISIT: Performed by: OCCUPATIONAL THERAPIST

## 2017-03-08 PROCEDURE — 25000131 ZZH RX MED GY IP 250 OP 636 PS 637: Performed by: TRANSPLANT SURGERY

## 2017-03-08 PROCEDURE — 97530 THERAPEUTIC ACTIVITIES: CPT | Mod: GO | Performed by: OCCUPATIONAL THERAPIST

## 2017-03-08 PROCEDURE — 25000132 ZZH RX MED GY IP 250 OP 250 PS 637: Performed by: TRANSPLANT SURGERY

## 2017-03-08 PROCEDURE — P9047 ALBUMIN (HUMAN), 25%, 50ML: HCPCS | Performed by: PHYSICIAN ASSISTANT

## 2017-03-08 PROCEDURE — 85025 COMPLETE CBC W/AUTO DIFF WBC: CPT | Performed by: PHYSICIAN ASSISTANT

## 2017-03-08 PROCEDURE — 25000125 ZZHC RX 250: Performed by: TRANSPLANT SURGERY

## 2017-03-08 PROCEDURE — 25000131 ZZH RX MED GY IP 250 OP 636 PS 637: Performed by: PHYSICIAN ASSISTANT

## 2017-03-08 PROCEDURE — 25000128 H RX IP 250 OP 636: Performed by: PHYSICIAN ASSISTANT

## 2017-03-08 PROCEDURE — 25000128 H RX IP 250 OP 636: Performed by: TRANSPLANT SURGERY

## 2017-03-08 PROCEDURE — 25000132 ZZH RX MED GY IP 250 OP 250 PS 637: Performed by: PHYSICIAN ASSISTANT

## 2017-03-08 PROCEDURE — 12000026 ZZH R&B TRANSPLANT

## 2017-03-08 PROCEDURE — 00000146 ZZHCL STATISTIC GLUCOSE BY METER IP

## 2017-03-08 PROCEDURE — 36592 COLLECT BLOOD FROM PICC: CPT | Performed by: PHYSICIAN ASSISTANT

## 2017-03-08 PROCEDURE — 25000131 ZZH RX MED GY IP 250 OP 636 PS 637: Performed by: NURSE PRACTITIONER

## 2017-03-08 PROCEDURE — 25000132 ZZH RX MED GY IP 250 OP 250 PS 637: Performed by: STUDENT IN AN ORGANIZED HEALTH CARE EDUCATION/TRAINING PROGRAM

## 2017-03-08 PROCEDURE — 85610 PROTHROMBIN TIME: CPT | Performed by: PHYSICIAN ASSISTANT

## 2017-03-08 PROCEDURE — 84100 ASSAY OF PHOSPHORUS: CPT | Performed by: PHYSICIAN ASSISTANT

## 2017-03-08 PROCEDURE — 83735 ASSAY OF MAGNESIUM: CPT | Performed by: PHYSICIAN ASSISTANT

## 2017-03-08 PROCEDURE — 80053 COMPREHEN METABOLIC PANEL: CPT | Performed by: PHYSICIAN ASSISTANT

## 2017-03-08 PROCEDURE — 25000132 ZZH RX MED GY IP 250 OP 250 PS 637: Performed by: INTERNAL MEDICINE

## 2017-03-08 PROCEDURE — 82248 BILIRUBIN DIRECT: CPT | Performed by: PHYSICIAN ASSISTANT

## 2017-03-08 PROCEDURE — 25000132 ZZH RX MED GY IP 250 OP 250 PS 637: Performed by: SURGERY

## 2017-03-08 PROCEDURE — 80197 ASSAY OF TACROLIMUS: CPT | Performed by: PHYSICIAN ASSISTANT

## 2017-03-08 RX ORDER — ALBUMIN (HUMAN) 12.5 G/50ML
12.5 SOLUTION INTRAVENOUS 2 TIMES DAILY
Status: DISCONTINUED | OUTPATIENT
Start: 2017-03-08 | End: 2017-03-10 | Stop reason: HOSPADM

## 2017-03-08 RX ORDER — FUROSEMIDE 10 MG/ML
20 INJECTION INTRAMUSCULAR; INTRAVENOUS ONCE
Status: COMPLETED | OUTPATIENT
Start: 2017-03-08 | End: 2017-03-08

## 2017-03-08 RX ORDER — BISACODYL 10 MG
10 SUPPOSITORY, RECTAL RECTAL DAILY PRN
Status: DISCONTINUED | OUTPATIENT
Start: 2017-03-08 | End: 2017-03-10 | Stop reason: HOSPADM

## 2017-03-08 RX ORDER — BISACODYL 10 MG
10 SUPPOSITORY, RECTAL RECTAL ONCE
Status: COMPLETED | OUTPATIENT
Start: 2017-03-08 | End: 2017-03-08

## 2017-03-08 RX ORDER — HYDROMORPHONE HYDROCHLORIDE 1 MG/ML
.5-1 INJECTION, SOLUTION INTRAMUSCULAR; INTRAVENOUS; SUBCUTANEOUS
Status: ACTIVE | OUTPATIENT
Start: 2017-03-08 | End: 2017-03-09

## 2017-03-08 RX ORDER — VALGANCICLOVIR 450 MG/1
450 TABLET, FILM COATED ORAL DAILY
Status: DISCONTINUED | OUTPATIENT
Start: 2017-03-09 | End: 2017-03-10 | Stop reason: HOSPADM

## 2017-03-08 RX ORDER — TACROLIMUS 1 MG/1
3 CAPSULE ORAL
Status: DISCONTINUED | OUTPATIENT
Start: 2017-03-08 | End: 2017-03-10 | Stop reason: HOSPADM

## 2017-03-08 RX ORDER — VALGANCICLOVIR HYDROCHLORIDE 50 MG/ML
450 POWDER, FOR SOLUTION ORAL DAILY
Status: DISCONTINUED | OUTPATIENT
Start: 2017-03-09 | End: 2017-03-10 | Stop reason: HOSPADM

## 2017-03-08 RX ADMIN — ASPIRIN 325 MG ORAL TABLET 325 MG: 325 PILL ORAL at 08:04

## 2017-03-08 RX ADMIN — FUROSEMIDE 20 MG: 10 INJECTION, SOLUTION INTRAVENOUS at 10:36

## 2017-03-08 RX ADMIN — CLOTRIMAZOLE 1 TROCHE: 10 LOZENGE ORAL at 19:59

## 2017-03-08 RX ADMIN — MYCOPHENOLATE MOFETIL 1000 MG: 250 CAPSULE ORAL at 17:58

## 2017-03-08 RX ADMIN — CYCLOBENZAPRINE HYDROCHLORIDE 10 MG: 10 TABLET, FILM COATED ORAL at 19:11

## 2017-03-08 RX ADMIN — CLOTRIMAZOLE 1 TROCHE: 10 LOZENGE ORAL at 08:05

## 2017-03-08 RX ADMIN — MULTIPLE VITAMINS W/ MINERALS TAB 1 TABLET: TAB at 08:04

## 2017-03-08 RX ADMIN — MYCOPHENOLATE MOFETIL 1000 MG: 250 CAPSULE ORAL at 08:03

## 2017-03-08 RX ADMIN — VALGANCICLOVIR HYDROCHLORIDE 450 MG: 450 TABLET ORAL at 08:04

## 2017-03-08 RX ADMIN — OXYCODONE HYDROCHLORIDE 10 MG: 5 TABLET ORAL at 16:59

## 2017-03-08 RX ADMIN — SODIUM BICARBONATE 650 MG TABLET 1300 MG: at 08:04

## 2017-03-08 RX ADMIN — HUMAN INSULIN: 100 INJECTION, SOLUTION SUBCUTANEOUS at 10:25

## 2017-03-08 RX ADMIN — HUMAN INSULIN: 100 INJECTION, SOLUTION SUBCUTANEOUS at 15:57

## 2017-03-08 RX ADMIN — BISACODYL 10 MG: 10 SUPPOSITORY RECTAL at 10:37

## 2017-03-08 RX ADMIN — INSULIN GLARGINE 30 UNITS: 100 INJECTION, SOLUTION SUBCUTANEOUS at 17:58

## 2017-03-08 RX ADMIN — OMEPRAZOLE 20 MG: 20 CAPSULE, DELAYED RELEASE ORAL at 08:04

## 2017-03-08 RX ADMIN — OXYCODONE HYDROCHLORIDE 10 MG: 5 TABLET ORAL at 08:31

## 2017-03-08 RX ADMIN — SULFAMETHOXAZOLE AND TRIMETHOPRIM 1 TABLET: 400; 80 TABLET ORAL at 08:04

## 2017-03-08 RX ADMIN — PREDNISONE 25 MG: 5 TABLET ORAL at 08:04

## 2017-03-08 RX ADMIN — SENNOSIDES AND DOCUSATE SODIUM 2 TABLET: 8.6; 5 TABLET ORAL at 08:04

## 2017-03-08 RX ADMIN — CLOTRIMAZOLE 1 TROCHE: 10 LOZENGE ORAL at 13:12

## 2017-03-08 RX ADMIN — TACROLIMUS 2 MG: 1 CAPSULE ORAL at 08:04

## 2017-03-08 RX ADMIN — ALBUMIN (HUMAN) 12.5 G: 12.5 SOLUTION INTRAVENOUS at 20:00

## 2017-03-08 RX ADMIN — DIPHENHYDRAMINE HYDROCHLORIDE 50 MG: 25 CAPSULE ORAL at 16:59

## 2017-03-08 RX ADMIN — ALBUMIN (HUMAN) 12.5 G: 12.5 SOLUTION INTRAVENOUS at 10:34

## 2017-03-08 RX ADMIN — SODIUM BICARBONATE 650 MG TABLET 1300 MG: at 19:59

## 2017-03-08 RX ADMIN — OXYCODONE HYDROCHLORIDE 10 MG: 5 TABLET ORAL at 00:18

## 2017-03-08 RX ADMIN — OXYCODONE HYDROCHLORIDE 10 MG: 5 TABLET ORAL at 13:11

## 2017-03-08 RX ADMIN — OXYCODONE HYDROCHLORIDE 10 MG: 5 TABLET ORAL at 21:29

## 2017-03-08 RX ADMIN — TACROLIMUS 3 MG: 1 CAPSULE ORAL at 17:57

## 2017-03-08 RX ADMIN — SODIUM CHLORIDE 20 MG: 9 INJECTION, SOLUTION INTRAVENOUS at 10:36

## 2017-03-08 NOTE — PROGRESS NOTES
"  Transplant Surgery  Inpatient Daily Progress Note  03/08/2017    Assessment & Plan: 51M cirrhosis CASTAÑEDA POD#4 OLT    Graft function: Good function. POD 1 US unable to visualize HA. CTA performed, main, left and right central HA patent, portal vein patent.   -LFTs trending down.  Pain: Incisional pain and new MSK back pain, new. Oxycodone PRN and flexeril PRN. Abdominal binder. Heat, OOB to chair and PT/OT for back.  Immunosuppression management:   -Simulect and steroids per protocol  -MMF 1 gm BID  -Tacrolimus 1 mg BID started POD 1. Level < 3. Increased to 2 mg BID yesterday.     Complexity of management:High. Contributing factors: organ dysfunction and new transplant, acute on chronic kidney injury  Hematology: Hgb 8.4, stable. PLT 35, trending up. INR 1.16. Start  mg for HA ppx.   Cardiorespiratory: Extubated POD 1. Stable on RA. OOB to chair. Aggressive pulmonary toilet.  GI/Nutrition: Regular diet. Calorie counts x 3- yesterday kit 1987/pro 92 g.     Endocrine:  DM type 2. PTA Lantus 65 units and Novolog SSI. Hyperglycemia 2/2 steroids. On insulin gtt. Carb cover meals. Endocrine team consulting.  Renal/Fluid/Electrolytes: Acute kidney injury. PTA Cr 0.8, admitted with dehydration, \"acute rise HRS vs bilirubin cast nephropathy\" per Nephrology note, Cr 1.4 on admission. Now with acute worsening of EJ due to liver transplant and IV contrast (CTA imaging of HA).   -Cr 2.2 down from 3.2. Good UO. K 3.9. Avoid nephrotoxins.   : Hx of urinary retention. Removed vazquez POD 3, monitor for retention.   Infectious disease: Periop antibiotics completed.   Prophylaxis: DVT PCDs,PPI  Activity: Out of bed to chair, ambulate x 4. Continue incentive spirometry.   Disposition: 7A. Start transplant education, have patient sign in on Ipad to my transplant place. Plan for possible d/c to home on Friday.     Medical Decision Making: High  Admit [id number] (high level decision making)    PILLO/Fellow/Resident Provider: Ada" Villa, PAC 7295    Faculty: Drew Dalal M.D.  _________________________________________________________________  Transplant History: Admitted 2/21/2017 for OLT.  3/4/2017 (Liver), Postoperative day: 4     Interval History: History is obtained from the patient  Overnight events: C/o back pain. No BM yet, but feels some abdominal cramping. Tolerating diet. Ambulating.      ROS:   A 10-point review of systems was negative except as noted above.    Meds:    bisacodyl  10 mg Rectal Once     albumin human  12.5 g Intravenous BID     furosemide  20 mg Intravenous Once     insulin aspart   Subcutaneous TID w/meals     clotrimazole  1 Beatrice Buccal TID     senna-docusate  2 tablet Oral BID     tacrolimus  2 mg Oral BID IS     omeprazole  20 mg Oral QAM AC     multivitamin, therapeutic with minerals  1 tablet Oral Daily     aspirin  325 mg Oral or Feeding Tube Daily     sodium chloride (PF)  3 mL Intravenous Q8H     [START ON 3/9/2017] predniSONE  10 mg Oral Once     valGANciclovir  450 mg Oral Every Other Day    Or     valGANciclovir  450 mg Oral or NG Tube Every Other Day     sulfamethoxazole-trimethoprim  1 tablet Oral Daily     mycophenolate  1,000 mg Oral BID IS    Or     mycophenolate  1,000 mg Oral or NG Tube BID IS     basiliximab (SIMULECT) infusion  20 mg Intravenous Once     sodium bicarbonate  1,300 mg Oral BID     miconazole with skin protectant   Topical BID     miconazole   Topical BID     Topical skin adhesive   Topical Once     gadobutrol  0.1 mL/kg Intravenous Once       Physical Exam:     Admit Weight: 103.9 kg (229 lb) (bed scale)    Current vitals:   /67 (BP Location: Left arm)  Pulse 74  Temp 98.3  F (36.8  C) (Oral)  Resp 16  Ht 1.829 m (6')  Wt 102 kg (224 lb 12.8 oz)  SpO2 95%  BMI 30.49 kg/m2        Vital sign ranges:    Temp:  [97.8  F (36.6  C)-98.4  F (36.9  C)] 98.3  F (36.8  C)  Pulse:  [66-74] 74  Heart Rate:  [65-74] 68  Resp:  [16] 16  BP: (127-153)/(67-86) 127/67  SpO2:  [95 %-100  %] 95 %  Patient Vitals for the past 24 hrs:   BP Temp Temp src Pulse Heart Rate Resp SpO2 Weight   03/08/17 0900 - - - - - - - 102 kg (224 lb 12.8 oz)   03/08/17 0623 127/67 98.3  F (36.8  C) Oral - 68 16 95 % -   03/08/17 0427 144/86 97.8  F (36.6  C) Axillary - 65 16 100 % -   03/08/17 0005 135/78 98.4  F (36.9  C) Oral - 66 16 99 % -   03/07/17 2001 143/76 98.1  F (36.7  C) Oral - 73 16 100 % -   03/07/17 1620 147/73 98.4  F (36.9  C) Oral 74 74 16 98 % -   03/07/17 1136 153/79 97.9  F (36.6  C) Oral 66 - 16 99 % -     General Appearance: NAD.   Skin: normal, jaundice  Heart: RRR  Lungs: Unlabored breathing on RA  Abdomen: soft, appropriately ttp. he is not tender over the liver graft. The wound is intact, dry  : vazquez removed.   Extremities: edema: present bilaterally. 1+  Neurologic: awake. Tremor absent.  CVC    Data:   CMP  Recent Labs  Lab 03/08/17  0649 03/07/17  0633 03/06/17  0329  03/05/17  0358  03/03/17  1458    133 139  < > 140  < > 131*   POTASSIUM 3.9 3.7 3.7  < > 3.9  < > 3.5   CHLORIDE 99 97 102  < > 106  < > 98   CO2 24 22 22  < > 20  < > 17*   * 131* 111*  < > 240*  < > 371*   * 117* 103*  < > 91*  < > 99*   CR 2.19* 3.21* 3.28*  < > 2.71*  < > 2.78*   GFRESTIMATED 32* 20* 20*  < > 25*  < > 24*   GFRESTBLACK 38* 25* 24*  < > 30*  < > 29*   JACOB 8.0* 8.0* 7.6*  < > 8.1*  < > 8.5   ICAW  --   --  4.4  --  4.7  < >  --    MAG 2.2 2.4* 2.4*  < > 2.2  --  2.7*   PHOS 3.7 5.9* 7.0*  < > 5.3*  --  4.6*   AMYLASE  --   --   --   --  53  --  70   LIPASE  --   --   --   --  216  --   --    ALBUMIN 2.4* 2.5* 2.4*  --  2.6*  < > 3.0*   BILITOTAL 4.8* 4.8* 5.2*  --  13.4*  < > 35.2*   ALKPHOS 86 80 69  --  72  < > 200*   AST 49* 56* 157*  --  680*  < > 116*   * 160* 209*  --  329*  < > 72*   < > = values in this interval not displayed.  CBC    Recent Labs  Lab 03/08/17  0649 03/07/17  0633   HGB 8.4* 8.5*   WBC 5.3 5.3   PLT 35* 26*     COAGS    Recent Labs  Lab 03/08/17  0649  03/07/17  0633 03/06/17  1549 03/06/17  0329   INR 1.16* 1.24*  --  1.37*   PTT  --   --  27 31      Urinalysis  Recent Labs   Lab Test  03/03/17   1405  03/01/17   0340   04/11/16   1345  02/08/16   1234   COLOR  Dark Yellow  Dark Yellow   < >  Yellow  Yellow   APPEARANCE  Slightly Cloudy  Slightly Cloudy   < >  Clear  Clear   URINEGLC  Negative  Negative   < >  30*  >1000*   URINEBILI  Moderate   This is an unconfirmed screening test result. A positive result may be false.  *  Moderate   This is an unconfirmed screening test result. A positive result may be false.  *   < >  Negative  Negative   URINEKETONE  Negative  Negative   < >  Negative  Negative   SG  1.010  1.011   < >  1.016  1.020   UBLD  Negative  Negative   < >  Small*  Small*   URINEPH  5.0  5.0   < >  5.0  5.0   PROTEIN  Negative  Negative   < >  30*  30*   NITRITE  Negative  Negative   < >  Negative  Negative   LEUKEST  Negative  Negative   < >  Negative  Negative   RBCU  1  <1   < >  1  2   WBCU  0  3*   < >  1  1   UTPG   --    --    --   0.41*  0.33*    < > = values in this interval not displayed.     Virology:  Hepatitis C Antibody   Date Value Ref Range Status   03/03/2017  NR Final    Nonreactive   Assay performance characteristics have not been established for newborns,   infants, and children     I have reviewed history, examined patient and discussed plan with the fellow/resident/PILLO.  I concur with the findings in this note.

## 2017-03-08 NOTE — TELEPHONE ENCOUNTER
Organ Offer Encounter Information    Organ Offer Information   Organ offer date & time:  3/3/2017  1:09 PM   Coordinator/Fellow/Attending name:  EMERY ROSADO   Organ(s):   Organ UNOS ID Match Run ID Comment Organ Laterality   Liver [id number]           Discussed risk category with Patient/Other:  N/A   Organ offer decision status Patient/Other:  Accepted Offer   Organ disposition:  Transplanted   Additional Comments:  3/3/2017 1:11 PM:  Liver: Primary  MD: Jong  Plan: Patient currently hospitalized. MD has spoken with patient and/or family. Donor OR has been set for 5AM. Will set Recipient OR for 9AM. Awaiting transportation information.  Emery Rosado  Transplant Coordinator    Unit: Currently on 6B  Immunology: [facility] 1415  OR: [facility] 1326 set for 9AM  Blood Bank: [facility] 1345  TransNet/ABO Verification: Done 1340  Add Organ: Done 1330    3/3/2017 7:21 PM:  Dr. Rubalcava and Dr. Jade will procure liver. Plan to leave from prior procurement in [place] to [facility]. \[facility] [place] [facility]\"" #[id number]. SRC accompanying them is Rodney from Infectious.   Other numbers for case:  Coordinator on site 3/3/17 Days: Isabel [phone]  Coordinator on site 3/3/17 7PM on: Sun [phone]  Emery Rosado  Transplant Coordinator   Attestation I have discussed all of the above with the Patient/Legal Guardian/Caregiver regarding this organ offer.:  Yes   Coordinator/Fellow/Attending name:  EMERY ROSADO

## 2017-03-08 NOTE — PROGRESS NOTES
Diabetes Consult Daily  Progress Note          Assessment/Plan:   Camacho Bhagat is a 51 year old with uncontrolled type 2 diabetes and with end stage liver disease and listed for transplant, admitted to [facility] 2/21/2017 with worsening encephalopathy and concern for infection. He was experiencing persistent hyperglycemia in the setting of likely omitted aspart, building glucose toxicity, and potentially infection (SBP negative) .   Now s/p DDOLT on (3/4/2017), extubated and working with therapies    BG levels under good control after high rate of insulin and decreased steroid doses. Overnight  glucose in excellent control on just 1 unit per hour  Large decrease in steroid dose today to 25 mg Prednisone, tomorrow 10 mg will greatly reduce insulin needs  For Transition to SQ insulin regimen this evening   Plan:  Transition to SQ insulin tonight and D/C IV insulin  Lantus 30 units every day, based on basal needs overnight from IV insulin drip rate  -NPH 20 units @0800AM with prednisone 10 mg  increase meal aspart to 1 unit per 8 grams of CHO with meals and snacks  Medium intensity Aspart correction @ bedtime  High intensity correction AC TID  BGM AC, HS and 0200A    Will continue to follow           Interval History:     On insulin drip, glucose in better control with high dosing to 10- 20 units per hour for several hours.with carb coverage   Overnight 2200 to 0600 glucose in low hundred's requiring avg of 1 unit per hour, however exponential insulin requirement during day after steroid and with food   Will transition tonight to SQ insulin regimen    Taper schedule for steroids: methylprednisolone 200 mg POD # 1, 100 mg (3/6), 50 mg POD # 3, followed by Prednisone 25 mg POD #4, prednisone 10 mg on POD # 5  Taking PO gradually and increasing everyday, no respiratory C/O, voiding without difficulty, afebrile  Discussed with  him plan for Insulin transition today as steroids decrease  Working on  therapies        Recent Labs  Lab 03/08/17  1704 03/08/17  1559 03/08/17  1531 03/08/17  1412 03/08/17  1257 03/08/17  1155  03/08/17  0649  03/07/17  0633  03/06/17  0329  03/05/17  1707  03/05/17  1016  03/05/17  0358   GLC  --   --   --   --   --   --   --  116*  --  131*  --  111*  --  204*  --  108*  --  240*   * 155* 198* 246* 198* 197*  < >  --   < >  --   < >  --   < >  --   < >  --   < >  --    < > = values in this interval not displayed.            Review of Systems:      please see interval hsitory       Medications:       Active Diet Order      Advance Diet as Tolerated: Regular Diet Adult     Physical Exam:  Gen: Alert,  Was able to stand at bedside, NAD   HEENT: NC/AT, mucous membranes are moist, still jaudice   Resp: Unlabored, no cough  Ext: moving all extremities, mild edema  Neuro:oriented x3, communicating clearly  /77 (BP Location: Left arm)  Pulse 74  Temp 98.3  F (36.8  C) (Oral)  Resp 16  Ht 1.829 m (6')  Wt 102 kg (224 lb 12.8 oz)  SpO2 100%  BMI 30.49 kg/m2           Data:     Lab Results   Component Value Date    A1C 9.4 02/16/2017    A1C 6.2 12/14/2016    A1C 6.1 10/27/2016    A1C 8.3 07/02/2012    A1C 8.5 07/31/2009              CBC RESULTS:   Recent Labs   Lab Test  03/06/17 0329   WBC  4.3   RBC  2.58*   HGB  8.4*   HCT  23.3*   MCV  90   MCH  32.6   MCHC  36.1   RDW  17.4*   PLT  23*     Recent Labs   Lab Test  03/06/17 0329 03/05/17   1707   NA  139  139   POTASSIUM  3.7  3.6   CHLORIDE  102  105   CO2  22  22   ANIONGAP  15*  11   GLC  111*  204*   BUN  103*  98*   CR  3.28*  2.98*   JACOB  7.6*  7.2*     Liver Function Studies -   Recent Labs   Lab Test  03/06/17 0329   PROTTOTAL  4.3*   ALBUMIN  2.4*   BILITOTAL  5.2*   ALKPHOS  69   AST  157*   ALT  209*     Lab Results   Component Value Date    INR 1.37 03/06/2017    INR 1.45 03/05/2017    INR 1.62 03/05/2017    INR 1.94 03/04/2017    INR 1.81 03/04/2017    INR 2.19 03/04/2017    INR 1.84 03/04/2017    INR 1.81  03/04/2017    INR 2.05 03/04/2017    INR 1.67 03/04/2017    INR 1.82 03/03/2017    INR 1.73 03/03/2017           Roselia Bryson CNP pager [phone]  Diabetes Management Job Code 0246

## 2017-03-08 NOTE — PROGRESS NOTES
Calorie Counts  Intake recorded for: 3/7  Kcals: 1987  Protein: 92g  # Meals Recorded: 3 meals (First 100% cream of rice with brown sugar, 2 8 oz skim milks, orange juice, banana, bread)      (Second - 100% tomato soup, cantaloupe, 2 8 oz skim milks, 75%  grilled cheese sandwich)      (Third - 100% chicken noodle soup, buttered egg noodles, 2 8 oz skim milks, cantaloupe)  # Supplements Recorded: 100% 2 Ensure Clears

## 2017-03-08 NOTE — PLAN OF CARE
Problem: Goal Outcome Summary  Goal: Goal Outcome Summary  Outcome: No Change  [id number]: Afebrile. VSS on RA. Pt complaining of some lower back pain, PRN Oxycodone given x1 and PRN Flexeril given x1, Aqua-K pad also in use. Pt also complaining of some itching, pt states that when he takes pain medications his skin starts to itch, PRN Benadryl given x1 with minimal relief. Pt tolerating regular diet, denies nausea. BS checks every hour since pt is on the insulin drip, BS starting at 326 at the beginning of shift and slowly trending down to the mid-100s. Triple Lumen IJ, blue port infusing with TKO NS at 10 mL/hr, red port infusing with NS at 20 mL/hr and insulin drip, white port SL. Voiding adequate amounts via the urinal at the bedside. No BM this shift, pt passing flatus and scheduled dose of Senna administered. Incision c/d/i, no drainage, approximated, and open to air. Up with assist x1 and walker, pt ambulated the unit and sat up in the chair for dinner. Call light in reach. Will continue to monitor and follow plan of care.

## 2017-03-08 NOTE — PLAN OF CARE
Problem: Goal Outcome Summary  Goal: Goal Outcome Summary  Outcome: Therapy, progress toward functional goals as expected  OT 7A: Patient is progressing well with ADL and mobility. Supervision-Mod (I) with ambulation in halls with WW. Has access to 3 walkers at home. Still needing assist with bed mobility and LE dressing, donning abdominal binder, but Mod (I) with toileting and simple ADL in standing at sink. Reports he will have wife, mother, daughter to care for him at home, and he has access to all needed equipment. He declines a TCU stay and also declines home therapy. He plans to attend outpatient therapy should he need it in the future. Anticipate home with assist when medically stable.

## 2017-03-08 NOTE — PROGRESS NOTES
Moderate back pain this morning after patient got up to the chair and ate breakfast. His abdomen is distended, passing flatus but no BM. Oxycodone and Flexeril with relief, has IV Dilaudid PRN if needed for breakthrough pain. Dulcolax suppository X1, no results yet. Blood sugars elevated after breakfast, has carb coverage, on Algorithm 4, from Algorithm 1 overnight. Blood sugars 108 to 278. Albumin 12.5 GM IV X1, Lasix 20 mg IV after Albumin. 500cc void this morning, no void after Lasix yet. Simulect IV X1. Needs post void bladder scan.

## 2017-03-08 NOTE — PLAN OF CARE
Problem: Individualization  Goal: Patient Preferences  Outcome: Improving  AVSS on RA. Complained of back pain. Oxycodone given x1 with some relief. Aqua k-pad on lower back with relief. BS ranged from  using algorithm 1&2. Voiding adequate amounts. No BM on this shift. TABITHA drained adequate amount of serosanguinous fluid.  Incision c/d/i. Slept fair. Continue with plan of care.

## 2017-03-09 ENCOUNTER — APPOINTMENT (OUTPATIENT)
Dept: OCCUPATIONAL THERAPY | Facility: CLINIC | Age: 52
DRG: 005 | End: 2017-03-09
Payer: COMMERCIAL

## 2017-03-09 ENCOUNTER — APPOINTMENT (OUTPATIENT)
Dept: PHYSICAL THERAPY | Facility: CLINIC | Age: 52
DRG: 005 | End: 2017-03-09
Payer: COMMERCIAL

## 2017-03-09 LAB
ALBUMIN SERPL-MCNC: 2.3 G/DL (ref 3.4–5)
ALP SERPL-CCNC: 88 U/L (ref 40–150)
ALT SERPL W P-5'-P-CCNC: 113 U/L (ref 0–70)
ANION GAP SERPL CALCULATED.3IONS-SCNC: 9 MMOL/L (ref 3–14)
AST SERPL W P-5'-P-CCNC: 53 U/L (ref 0–45)
BACTERIA SPEC CULT: NO GROWTH
BASOPHILS # BLD AUTO: 0 10E9/L (ref 0–0.2)
BASOPHILS NFR BLD AUTO: 0 %
BILIRUB DIRECT SERPL-MCNC: 3.1 MG/DL (ref 0–0.2)
BILIRUB SERPL-MCNC: 4 MG/DL (ref 0.2–1.3)
BUN SERPL-MCNC: 84 MG/DL (ref 7–30)
CALCIUM SERPL-MCNC: 7.8 MG/DL (ref 8.5–10.1)
CHLORIDE SERPL-SCNC: 98 MMOL/L (ref 94–109)
CO2 SERPL-SCNC: 25 MMOL/L (ref 20–32)
CREAT SERPL-MCNC: 1.55 MG/DL (ref 0.66–1.25)
DIFFERENTIAL METHOD BLD: ABNORMAL
DONOR IDENTIFICATION: NORMAL
DSA COMMENTS: NORMAL
DSA PRESENT: NO
DSA TEST METHOD: NORMAL
EOSINOPHIL # BLD AUTO: 0.1 10E9/L (ref 0–0.7)
EOSINOPHIL NFR BLD AUTO: 2.7 %
ERYTHROCYTE [DISTWIDTH] IN BLOOD BY AUTOMATED COUNT: 16.1 % (ref 10–15)
GFR SERPL CREATININE-BSD FRML MDRD: 47 ML/MIN/1.7M2
GLUCOSE SERPL-MCNC: 303 MG/DL (ref 70–99)
HCT VFR BLD AUTO: 22.8 % (ref 40–53)
HGB BLD-MCNC: 8 G/DL (ref 13.3–17.7)
IMM GRANULOCYTES # BLD: 0 10E9/L (ref 0–0.4)
IMM GRANULOCYTES NFR BLD: 0.2 %
LYMPHOCYTES # BLD AUTO: 0.6 10E9/L (ref 0.8–5.3)
LYMPHOCYTES NFR BLD AUTO: 11.9 %
MAGNESIUM SERPL-MCNC: 2 MG/DL (ref 1.6–2.3)
MCH RBC QN AUTO: 32.4 PG (ref 26.5–33)
MCHC RBC AUTO-ENTMCNC: 35.1 G/DL (ref 31.5–36.5)
MCV RBC AUTO: 92 FL (ref 78–100)
MICRO REPORT STATUS: NORMAL
MONOCYTES # BLD AUTO: 0.1 10E9/L (ref 0–1.3)
MONOCYTES NFR BLD AUTO: 2.5 %
NEUTROPHILS # BLD AUTO: 4 10E9/L (ref 1.6–8.3)
NEUTROPHILS NFR BLD AUTO: 82.7 %
NRBC # BLD AUTO: 0 10*3/UL
NRBC BLD AUTO-RTO: 0 /100
ORGAN: NORMAL
PHOSPHATE SERPL-MCNC: 2.6 MG/DL (ref 2.5–4.5)
PLATELET # BLD AUTO: 36 10E9/L (ref 150–450)
POTASSIUM SERPL-SCNC: 4.2 MMOL/L (ref 3.4–5.3)
PROT SERPL-MCNC: 4.6 G/DL (ref 6.8–8.8)
RBC # BLD AUTO: 2.47 10E12/L (ref 4.4–5.9)
SA1 CELL: NORMAL
SA1 COMMENTS: NORMAL
SA1 HI RISK ABY: NORMAL
SA1 MOD RISK ABY: NORMAL
SA1 TEST METHOD: NORMAL
SA2 CELL: NORMAL
SA2 COMMENTS: NORMAL
SA2 HI RISK ABY UA: NORMAL
SA2 MOD RISK ABY: NORMAL
SA2 TEST METHOD: NORMAL
SODIUM SERPL-SCNC: 131 MMOL/L (ref 133–144)
SPECIMEN SOURCE: NORMAL
TACROLIMUS BLD-MCNC: ABNORMAL UG/L (ref 5–15)
TME LAST DOSE: ABNORMAL H
WBC # BLD AUTO: 4.8 10E9/L (ref 4–11)

## 2017-03-09 PROCEDURE — 25000132 ZZH RX MED GY IP 250 OP 250 PS 637: Performed by: INTERNAL MEDICINE

## 2017-03-09 PROCEDURE — 97530 THERAPEUTIC ACTIVITIES: CPT | Mod: GP

## 2017-03-09 PROCEDURE — 25000132 ZZH RX MED GY IP 250 OP 250 PS 637: Performed by: SURGERY

## 2017-03-09 PROCEDURE — 84100 ASSAY OF PHOSPHORUS: CPT | Performed by: PHYSICIAN ASSISTANT

## 2017-03-09 PROCEDURE — 12000026 ZZH R&B TRANSPLANT

## 2017-03-09 PROCEDURE — 80053 COMPREHEN METABOLIC PANEL: CPT | Performed by: PHYSICIAN ASSISTANT

## 2017-03-09 PROCEDURE — 36592 COLLECT BLOOD FROM PICC: CPT | Performed by: PHYSICIAN ASSISTANT

## 2017-03-09 PROCEDURE — 25000132 ZZH RX MED GY IP 250 OP 250 PS 637: Performed by: TRANSPLANT SURGERY

## 2017-03-09 PROCEDURE — 97530 THERAPEUTIC ACTIVITIES: CPT | Mod: GO

## 2017-03-09 PROCEDURE — 25000131 ZZH RX MED GY IP 250 OP 636 PS 637: Performed by: NURSE PRACTITIONER

## 2017-03-09 PROCEDURE — 82248 BILIRUBIN DIRECT: CPT | Performed by: PHYSICIAN ASSISTANT

## 2017-03-09 PROCEDURE — 83735 ASSAY OF MAGNESIUM: CPT | Performed by: PHYSICIAN ASSISTANT

## 2017-03-09 PROCEDURE — 25000131 ZZH RX MED GY IP 250 OP 636 PS 637: Performed by: PHYSICIAN ASSISTANT

## 2017-03-09 PROCEDURE — P9047 ALBUMIN (HUMAN), 25%, 50ML: HCPCS | Performed by: PHYSICIAN ASSISTANT

## 2017-03-09 PROCEDURE — 00000146 ZZHCL STATISTIC GLUCOSE BY METER IP

## 2017-03-09 PROCEDURE — 25000131 ZZH RX MED GY IP 250 OP 636 PS 637: Performed by: TRANSPLANT SURGERY

## 2017-03-09 PROCEDURE — 97116 GAIT TRAINING THERAPY: CPT | Mod: GP

## 2017-03-09 PROCEDURE — 97535 SELF CARE MNGMENT TRAINING: CPT | Mod: GO

## 2017-03-09 PROCEDURE — 40000193 ZZH STATISTIC PT WARD VISIT

## 2017-03-09 PROCEDURE — 80197 ASSAY OF TACROLIMUS: CPT | Performed by: PHYSICIAN ASSISTANT

## 2017-03-09 PROCEDURE — 85025 COMPLETE CBC W/AUTO DIFF WBC: CPT | Performed by: PHYSICIAN ASSISTANT

## 2017-03-09 PROCEDURE — 25000128 H RX IP 250 OP 636: Performed by: PHYSICIAN ASSISTANT

## 2017-03-09 PROCEDURE — 25000125 ZZHC RX 250: Performed by: TRANSPLANT SURGERY

## 2017-03-09 PROCEDURE — 25000132 ZZH RX MED GY IP 250 OP 250 PS 637: Performed by: PHYSICIAN ASSISTANT

## 2017-03-09 PROCEDURE — 40000133 ZZH STATISTIC OT WARD VISIT

## 2017-03-09 RX ADMIN — OXYCODONE HYDROCHLORIDE 10 MG: 5 TABLET ORAL at 08:11

## 2017-03-09 RX ADMIN — SENNOSIDES AND DOCUSATE SODIUM 2 TABLET: 8.6; 5 TABLET ORAL at 19:51

## 2017-03-09 RX ADMIN — OXYCODONE HYDROCHLORIDE 10 MG: 5 TABLET ORAL at 12:31

## 2017-03-09 RX ADMIN — SODIUM BICARBONATE 650 MG TABLET 1300 MG: at 08:00

## 2017-03-09 RX ADMIN — SULFAMETHOXAZOLE AND TRIMETHOPRIM 1 TABLET: 400; 80 TABLET ORAL at 08:00

## 2017-03-09 RX ADMIN — CLOTRIMAZOLE 1 TROCHE: 10 LOZENGE ORAL at 12:31

## 2017-03-09 RX ADMIN — VALGANCICLOVIR HYDROCHLORIDE 450 MG: 450 TABLET ORAL at 08:00

## 2017-03-09 RX ADMIN — CYCLOBENZAPRINE HYDROCHLORIDE 10 MG: 10 TABLET, FILM COATED ORAL at 03:16

## 2017-03-09 RX ADMIN — TACROLIMUS 3 MG: 1 CAPSULE ORAL at 18:03

## 2017-03-09 RX ADMIN — CYCLOBENZAPRINE HYDROCHLORIDE 10 MG: 10 TABLET, FILM COATED ORAL at 10:26

## 2017-03-09 RX ADMIN — CLOTRIMAZOLE 1 TROCHE: 10 LOZENGE ORAL at 19:50

## 2017-03-09 RX ADMIN — INSULIN HUMAN 20 UNITS: 100 INJECTION, SUSPENSION SUBCUTANEOUS at 07:54

## 2017-03-09 RX ADMIN — CLOTRIMAZOLE 1 TROCHE: 10 LOZENGE ORAL at 07:59

## 2017-03-09 RX ADMIN — ALBUMIN (HUMAN) 12.5 G: 12.5 SOLUTION INTRAVENOUS at 19:51

## 2017-03-09 RX ADMIN — OXYCODONE HYDROCHLORIDE 10 MG: 5 TABLET ORAL at 16:56

## 2017-03-09 RX ADMIN — MYCOPHENOLATE MOFETIL 1000 MG: 250 CAPSULE ORAL at 07:58

## 2017-03-09 RX ADMIN — ASPIRIN 325 MG ORAL TABLET 325 MG: 325 PILL ORAL at 08:00

## 2017-03-09 RX ADMIN — OMEPRAZOLE 20 MG: 20 CAPSULE, DELAYED RELEASE ORAL at 08:00

## 2017-03-09 RX ADMIN — MYCOPHENOLATE MOFETIL 1000 MG: 250 CAPSULE ORAL at 18:03

## 2017-03-09 RX ADMIN — ALBUMIN (HUMAN) 12.5 G: 12.5 SOLUTION INTRAVENOUS at 07:52

## 2017-03-09 RX ADMIN — OXYCODONE HYDROCHLORIDE 10 MG: 5 TABLET ORAL at 21:50

## 2017-03-09 RX ADMIN — PREDNISONE 10 MG: 10 TABLET ORAL at 08:00

## 2017-03-09 RX ADMIN — CYCLOBENZAPRINE HYDROCHLORIDE 10 MG: 10 TABLET, FILM COATED ORAL at 19:50

## 2017-03-09 RX ADMIN — SODIUM BICARBONATE 650 MG TABLET 1300 MG: at 19:50

## 2017-03-09 RX ADMIN — OXYCODONE HYDROCHLORIDE 10 MG: 5 TABLET ORAL at 01:16

## 2017-03-09 RX ADMIN — MULTIPLE VITAMINS W/ MINERALS TAB 1 TABLET: TAB at 08:00

## 2017-03-09 RX ADMIN — TACROLIMUS 3 MG: 1 CAPSULE ORAL at 07:59

## 2017-03-09 ASSESSMENT — PAIN DESCRIPTION - DESCRIPTORS: DESCRIPTORS: ACHING

## 2017-03-09 NOTE — PLAN OF CARE
Problem: Goal Outcome Summary  Goal: Goal Outcome Summary  Outcome: No Change  Pt slightly hypertensive intermittently throughout shift otherwise he is afebrile and other vitals are stable. Insulin drip discontinued at 3/8/2017 at 2000. Blood sugar 311 and 319, on call provider notified; a one time order of Novolog 3units subQ was administered at 0230. Pt ate well with no complaints of nausea. Pt independently ambulating to bathroom and down bishop and then walked the unit with a standy-by assist. Pt voided urine adequately this shift, he reported no BM. Pt's primary complaint is mid and lower back pain; Oxycodone 10mg was given x one this shift along with a cold pack. TABITHA drain emptied producing large amounts of serosanguinous fluid. Pt repositions independently in bed with pillows for support. Incision is approximated, stapled, and CDI.

## 2017-03-09 NOTE — PLAN OF CARE
Problem: Goal Outcome Summary  Goal: Goal Outcome Summary  Outcome: Therapy, progress toward functional goals as expected  OT/7A - Educated on adaptive equipment for increased compliance with abdominal precautions during dressing.  Pt dons/doffs bilatearl socks using AE with min A. Facilitated functional mobility 500+ feet with fww and SBA, no LOB noted. Pt limited by back pain and post surgical precautions.      REC: He declines a TCU stay and also declines home therapy. He plans to attend outpatient therapy should he need it in the future. Anticipate home with assist from family when medically stable.

## 2017-03-09 NOTE — PROGRESS NOTES
Calorie Counts  Intake recorded for: 3/9  Kcals: 2388  Protein: 123g  # Meals Recorded: 3 meals (First - 100% 4 8 oz skim milks, 8 oz orange juice, corn flakes, wheat toast with 2 jelly)      (Second - 100% cream of rice with brown sugar, 2 8 oz skim milks, cantaloupe)      (Third - 100% sweet and sour stir torrez with chicken, orange wedges 2 8 oz skim milks)  # Supplements Recorded: 100% 3 Ensure Clears

## 2017-03-09 NOTE — PROGRESS NOTES
Patient is progressing well, up walking in the halls with minimal stand by assist using his walker. Blood sugars elevated to low 300's, received NPH this morning, Novolog correction and carb coverage and has scheduled Lantus this evening. Oxycodone and Flexerial for back and abdominal pain with relief. TABITHA with 775cc output this shift, Albumin 12.5 GM X1. Medication card and lab book updated. Specialty Pharmacy to see today.

## 2017-03-09 NOTE — PROGRESS NOTES
Nephrology Progress Note  03/08/2017       Interval History :   He is feeling well. No new complaints. Back pain a little maryanne. Good UOP, got one time dose of lasix 20mg IV. Cr trending down today.     Assessment & Recommendations:   51yM with hx of CASTAÑEDA cirrhosis/ESLD with portal HTN requiring twice weekly paracentesis, T2DM on insulin, HCC s/p TACE in 9/2016, GERD on PPI, and normal baseline renal function that is admitted with hepatic encephalopathy, hyperglycemia, and decompensated liver disease now with rising Cr.     1. EJ, baseline Cr 0.7-0.8, admission Cr 1.4, acute rise with HRS vs bili cast nephropathy, now s/p liver tx. Got contrast 3/5, on low dose tac for IS. Cr now improving, UOP improved and diuresing.  2. OLT on 3/4 for CASTAÑEDA cirrhosis, LFT's trending down, on IS including MMF and tac, CTA with intact hepatic vasculature  3. Metabolic acidosis, likely 2/2 EJ, improved  4. Anemia, Hgb stable today again  5. Hx of HCC s/p TACE 9/2016, repeat MRI with residual tumor but <5cm -> now s/p transplant  6. GERD on PPI  7. Hyperglycemia with T2DM, on insulin gtt     Recs:  1. Recovering kidney function  2. I don't think ongoing diuretics are necessary right now, will diurese on his own  3. Trend BMP  4. Renal will follow peripherally now     Staffed with Dr. Rubio.      Doug Elaine  Nephrology Fellow  Pager: [phone]      Review of Systems:   A 4 point review of systems was negative except as noted above.  Notably: good appetite. no nausea or vomiting or diarrhea.  no confusion no fever or chills    Physical Exam:   /77 (BP Location: Left arm)  Pulse 74  Temp 98.3  F (36.8  C) (Oral)  Resp 16  Ht 1.829 m (6')  Wt 102 kg (224 lb 12.8 oz)  SpO2 100%  BMI 30.49 kg/m2     Ins and Outs  Intake/Output Summary (Last 24 hours) at 03/08/17 1812  Last data filed at 03/08/17 1700   Gross per 24 hour   Intake             1600 ml   Output             5700 ml   Net            -4100 ml     General: resting  comfortably  HEENT: MMM  Heart: RRR, no murmur  Lungs: CTA anterior and posterior  Abd: abdomen less distended, drain in place and staples over incision, minimal tenderness  Ext: no edema to trace in feet/ankles  Access : no HD access    Labs:   All of today's labs reviewed.    Imaging:  Today's imaging reviewed.    Current Medications:  All Reviewed    Doug Elaine  Nephrology Fellow  Pager: [phone]    I was present with the fellow during the history and exam.  I discussed the case with the fellow and agree with the findings as documented in the assessment and plan.  Jael Rubio

## 2017-03-09 NOTE — PROGRESS NOTES
"  Transplant Surgery  Inpatient Daily Progress Note  03/09/2017    Assessment & Plan: 51M cirrhosis CASTAÑEDA POD#5 OLT    Graft function: Good function. POD 1 US unable to visualize HA. CTA performed, main, left and right central HA patent, portal vein patent.   -LFTs trending down.  Pain: Incisional pain and new MSK back pain. Oxycodone PRN and flexeril PRN. Abdominal binder. Heat, OOB to chair and PT/OT for back.  Immunosuppression management:   -Simulect and steroids per protocol  -MMF 1 gm BID  -Tacrolimus 1 mg BID started POD 1. FK 3 mg BID, increased after 3/8 level <3. 3/9 level pending.    Complexity of management:High. Contributing factors: organ dysfunction and new transplant, acute on chronic kidney injury  Hematology: Hgb 8.0, stable. PLT 36, trending up. Started  mg for HA ppx.   Cardiorespiratory: Extubated POD 1. Stable on RA. OOB to chair. Aggressive pulmonary toilet.  GI/Nutrition: Regular diet. Calorie counts x 3  Endocrine:  DM type 2. PTA Lantus 65 units and Novolog SSI. Hyperglycemia 2/2 steroids. On insulin gtt, discontinued yesterday and transitioned to Lantus 30 units/CC/SSI. Carb cover meals. Endocrine team consulting.  Renal/Fluid/Electrolytes: Acute kidney injury. PTA Cr 0.8, admitted with dehydration, \"acute rise HRS vs bilirubin cast nephropathy\" per Nephrology note, Cr 1.4 on admission. Now with acute worsening of EJ due to liver transplant and IV contrast (CTA imaging of HA).   -Cr 1.6<-2.2<-3.2. Good UO. K 4.2. Avoid nephrotoxins.   : Hx of urinary retention. Removed vazquez POD 3, monitor for retention.   Infectious disease: Periop antibiotics completed. AF.   Prophylaxis: DVT PCDs,PPI  Activity: Out of bed to chair, ambulate x 4. Continue incentive spirometry.   Disposition: 7A. Start transplant education, have patient sign in on Ipad to my transplant place. Plan for possible d/c to home vs TCU. OT: home, PT: TCU. Will discuss with social work.     Medical Decision Making: " High  Admit [id number] (high level decision making)    PILLO/Fellow/Resident Provider: Luiza Wing, PAC [id number]    Faculty: Drew Dalal M.D.  _________________________________________________________________  Transplant History: Admitted 2/21/2017 for OLT.  3/4/2017 (Liver), Postoperative day: 5     Interval History: History is obtained from the patient  Overnight events: Hyperglycemia s/p transition from insulin gtt.     ROS:   A 10-point review of systems was negative except as noted above.    Meds:    albumin human  12.5 g Intravenous BID     valGANciclovir  450 mg Oral Daily    Or     valGANciclovir  450 mg Oral or NG Tube Daily     tacrolimus  3 mg Oral BID IS     insulin glargine  30 Units Subcutaneous Q24H     insulin aspart  1-5 Units Subcutaneous At Bedtime     insulin aspart  1-10 Units Subcutaneous TID AC     insulin isophane human  20 Units Subcutaneous Daily     insulin aspart   Subcutaneous TID w/meals     clotrimazole  1 Beatrice Buccal TID     senna-docusate  2 tablet Oral BID     omeprazole  20 mg Oral QAM AC     multivitamin, therapeutic with minerals  1 tablet Oral Daily     aspirin  325 mg Oral or Feeding Tube Daily     sodium chloride (PF)  3 mL Intravenous Q8H     sulfamethoxazole-trimethoprim  1 tablet Oral Daily     mycophenolate  1,000 mg Oral BID IS    Or     mycophenolate  1,000 mg Oral or NG Tube BID IS     sodium bicarbonate  1,300 mg Oral BID     miconazole with skin protectant   Topical BID     miconazole   Topical BID     Topical skin adhesive   Topical Once       Physical Exam:     Admit Weight: 103.9 kg (229 lb) (bed scale)    Current vitals:   /71 (BP Location: Right arm)  Pulse 78  Temp 98.4  F (36.9  C) (Oral)  Resp 16  Ht 1.829 m (6')  Wt 103.1 kg (227 lb 4.8 oz)  SpO2 98%  BMI 30.83 kg/m2        Vital sign ranges:    Temp:  [98.3  F (36.8  C)-98.7  F (37.1  C)] 98.4  F (36.9  C)  Pulse:  [74-92] 78  Heart Rate:  [72-92] 77  Resp:  [16-18] 16  BP: (128-162)/(61-89) 146/71  SpO2:   [97 %-100 %] 98 %  Patient Vitals for the past 24 hrs:   BP Temp Temp src Pulse Heart Rate Resp SpO2 Weight   03/09/17 1133 146/71 98.4  F (36.9  C) Oral - 77 16 98 % -   03/09/17 0900 - - - - - - - 103.1 kg (227 lb 4.8 oz)   03/09/17 0814 155/86 98.4  F (36.9  C) Oral 78 - 18 100 % -   03/09/17 0334 153/85 98.4  F (36.9  C) Oral - 72 16 100 % -   03/09/17 0124 128/61 98.7  F (37.1  C) Oral 92 92 16 97 % -   03/08/17 1950 162/89 - - - 74 16 100 % -   03/08/17 1532 146/77 98.3  F (36.8  C) Oral 74 - 16 100 % -     General Appearance: NAD.   Skin: normal, jaundice  Heart: RRR  Lungs: Unlabored breathing on RA  Abdomen: soft, appropriately ttp. he is not tender over the liver graft. The wound is intact, dry  : vazquez removed.   Extremities: edema: present bilaterally. 1+  Neurologic: awake. Tremor absent.  CVC    Data:   CMP  Recent Labs  Lab 03/09/17  0652 03/08/17  0649  03/06/17  0329  03/05/17  0358  03/03/17  1458   * 133  < > 139  < > 140  < > 131*   POTASSIUM 4.2 3.9  < > 3.7  < > 3.9  < > 3.5   CHLORIDE 98 99  < > 102  < > 106  < > 98   CO2 25 24  < > 22  < > 20  < > 17*   * 116*  < > 111*  < > 240*  < > 371*   BUN 84* 106*  < > 103*  < > 91*  < > 99*   CR 1.55* 2.19*  < > 3.28*  < > 2.71*  < > 2.78*   GFRESTIMATED 47* 32*  < > 20*  < > 25*  < > 24*   GFRESTBLACK 57* 38*  < > 24*  < > 30*  < > 29*   JACOB 7.8* 8.0*  < > 7.6*  < > 8.1*  < > 8.5   ICAW  --   --   --  4.4  --  4.7  < >  --    MAG 2.0 2.2  < > 2.4*  < > 2.2  --  2.7*   PHOS 2.6 3.7  < > 7.0*  < > 5.3*  --  4.6*   AMYLASE  --   --   --   --   --  53  --  70   LIPASE  --   --   --   --   --  216  --   --    ALBUMIN 2.3* 2.4*  < > 2.4*  --  2.6*  < > 3.0*   BILITOTAL 4.0* 4.8*  < > 5.2*  --  13.4*  < > 35.2*   ALKPHOS 88 86  < > 69  --  72  < > 200*   AST 53* 49*  < > 157*  --  680*  < > 116*   * 130*  < > 209*  --  329*  < > 72*   < > = values in this interval not displayed.  CBC    Recent Labs  Lab 03/09/17  0652 03/08/17  0649    HGB 8.0* 8.4*   WBC 4.8 5.3   PLT 36* 35*     COAGS    Recent Labs  Lab 03/08/17  0649 03/07/17  0633 03/06/17  1549 03/06/17  0329   INR 1.16* 1.24*  --  1.37*   PTT  --   --  27 31      Urinalysis  Recent Labs   Lab Test  03/03/17   1405  03/01/17   0340   04/11/16   1345  02/08/16   1234   COLOR  Dark Yellow  Dark Yellow   < >  Yellow  Yellow   APPEARANCE  Slightly Cloudy  Slightly Cloudy   < >  Clear  Clear   URINEGLC  Negative  Negative   < >  30*  >1000*   URINEBILI  Moderate   This is an unconfirmed screening test result. A positive result may be false.  *  Moderate   This is an unconfirmed screening test result. A positive result may be false.  *   < >  Negative  Negative   URINEKETONE  Negative  Negative   < >  Negative  Negative   SG  1.010  1.011   < >  1.016  1.020   UBLD  Negative  Negative   < >  Small*  Small*   URINEPH  5.0  5.0   < >  5.0  5.0   PROTEIN  Negative  Negative   < >  30*  30*   NITRITE  Negative  Negative   < >  Negative  Negative   LEUKEST  Negative  Negative   < >  Negative  Negative   RBCU  1  <1   < >  1  2   WBCU  0  3*   < >  1  1   UTPG   --    --    --   0.41*  0.33*    < > = values in this interval not displayed.     Virology:  Hepatitis C Antibody   Date Value Ref Range Status   03/03/2017  NR Final    Nonreactive   Assay performance characteristics have not been established for newborns,   infants, and children     I have reviewed history, examined patient and discussed plan with the fellow/resident/PILLO.  I concur with the findings in this note.

## 2017-03-09 NOTE — PLAN OF CARE
Problem: Goal Outcome Summary  Goal: Goal Outcome Summary  Outcome: No Change  1570-1007: Afebrile. VSS on RA. Pt complaining of lower back pain, PRN PO Oxycodone given x2 and PRN PO Flexeril given x1. Pt tolerating regular diet, ate 100% of his dinner, denies nausea. Insulin drip discontinued at 2000, pt started on BS checks AC and HS along with daily scheduled dose of Lantus 30 units. BS check at 2200 was 196, no insulin needed per order parameters. Triple Lumen IJ SL. TABITHA Drain with moderate amounts of serosanguineous drainage, dressing changed this shift. Voiding adequate amounts via the urinal at the bedside, PVR 15 mL. Per pt report, one BM this shift, the writer of this note did not actually see the BM, pt flushed, pt refused scheduled dose of stool softener at 2000. Incision c/d/i, approximated, no drainage, and open to air. Up with assist x1 and walker, pt walked around the floor this evening. Med card updated. Call light in reach. Will continue to monitor and follow plan of care.

## 2017-03-09 NOTE — PROGRESS NOTES
Social Work    D: It is anticipated that pt will be ready for d/c tomorrow.  PT/OT are currently recommending TCU but pt has declined both TCU and home therapies according to chart notes.    A/I: Spoke with bedside RN who states that pt is progressing well.  Spoke with pt who states that he is not going to TCU he is going home, he states that he will have a caregiver with him at all times between his mom, wife and daughter.  He indicates that he and his wife are no longer , he reports strong family support.  He is eager to get home and his dog (Sinhala Orellana) Rock is a big motivator as well.  Pt feels comfortable with the plan.      Coordination with liver transplant team on above.    P: Plan is for pt to d/c home tomorrow with family assistance.  Pt declining to go to TCU and this seems reasonable.    JOSEY Hilliard, [facility]    Ph: [phone]  Pager: [phone]

## 2017-03-09 NOTE — PROGRESS NOTES
Diabetes Consult Daily  Progress Note          Assessment/Plan:   Camacho Bhagat is a 51 year old with uncontrolled type 2 diabetes and with end stage liver disease and listed for transplant, admitted to [facility] 2/21/2017 with worsening encephalopathy and concern for infection. He was experiencing persistent hyperglycemia in the setting of likely omitted aspart, building glucose toxicity, and potentially infection (SBP negative) .   Now s/p DDOLT on (3/4/2017), extubated and working with therapies     Overnight 03/07/17 on IV insulin  glucose in excellent control on just 1 unit per hour  After transition last PM to 30 units lantus calculated from IV rates, BG over 300 overnight, patient stated was eating popscicles but no insulin coverage  Today with additional 20 units NPH and correction and CHO coverage BG to 300's requiring more insulin than when on IV drip will increase lantus tonight and bedtime correction intensity  Today last day of prednisone 10 mg will hopefully greatly reduce insulin needs  However BG are not in good control yet and would be hesitant to D/C with recent changes being made    Plan:    increaseLantus 30 units  To 45 unitsevery day,   -D/C NPH 20 units if prednisone taper is complete   meal aspart to 1 unit per 8 grams of CHO with meals and snacks  high intensity Aspart correction @ bedtime  High intensity correction AC TID  BGM AC, HS and 0200A    Will continue to follow           Interval History:        On IV Overnight 2200 to 0600 glucose in low hundred's requiring avg of 1 unit per hour, however exponential insulin requirement during day after steroid and with food   After transition to SQ insulin regimen with lantus 30 units at 1800 last evening BG oriana to 300's overnight  Patient stated must have been those good popscicles he ate, did not  Receive insulin coverage    Taper schedule for steroids: methylprednisolone 200 mg POD # 1, 100 mg (3/6), 50 mg POD # 3, followed by  Prednisone 25 mg POD #4, prednisone 10 mg on POD # 5 is complete today  Taking PO gradually and increasing everyday, no respiratory C/O, voiding without difficulty, afebrile  Discussed with  Him need for insulin coverage of food to control BG and danger of giving too much lantus if unable to eat and need for less insulin with kidney dysfunction  Working on therapies  Social work note this afternooon states he plans to D/C directly home and wants to leave tomorrow        Recent Labs  Lab 03/09/17  1658 03/09/17  1137 03/09/17  0754 03/09/17  0652 03/09/17  0142 03/09/17  0118 03/08/17  2125  03/08/17  0649  03/07/17  0633  03/06/17  0329  03/05/17  1707  03/05/17  1016   GLC  --   --   --  303*  --   --   --   --  116*  --  131*  --  111*  --  204*  --  108*   * 336* 273*  --  319* 311* 196*  < >  --   < >  --   < >  --   < >  --   < >  --    < > = values in this interval not displayed.            Review of Systems:      please see interval hsitory       Medications:       Active Diet Order      Advance Diet as Tolerated: Regular Diet Adult     Physical Exam:  Gen: Alert,  Was able to stand at bedside, NAD   HEENT: NC/AT, mucous membranes are moist, still jaudice   Resp: Unlabored, no cough  Ext: moving all extremities, mild edema  Neuro:oriented x3, communicating clearly  /74 (BP Location: Right arm)  Pulse 74  Temp 98.3  F (36.8  C) (Oral)  Resp 16  Ht 1.829 m (6')  Wt 103.1 kg (227 lb 4.8 oz)  SpO2 100%  BMI 30.83 kg/m2           Data:     Lab Results   Component Value Date    A1C 9.4 02/16/2017    A1C 6.2 12/14/2016    A1C 6.1 10/27/2016    A1C 8.3 07/02/2012    A1C 8.5 07/31/2009              CBC RESULTS:   Recent Labs   Lab Test  03/06/17   0329   WBC  4.3   RBC  2.58*   HGB  8.4*   HCT  23.3*   MCV  90   MCH  32.6   MCHC  36.1   RDW  17.4*   PLT  23*     Recent Labs   Lab Test  03/06/17   0329  03/05/17   1707   NA  139  139   POTASSIUM  3.7  3.6   CHLORIDE  102  105   CO2  22  22   ANIONGAP  15*   11   GLC  111*  204*   BUN  103*  98*   CR  3.28*  2.98*   JACOB  7.6*  7.2*     Liver Function Studies -   Recent Labs   Lab Test  03/06/17   0329   PROTTOTAL  4.3*   ALBUMIN  2.4*   BILITOTAL  5.2*   ALKPHOS  69   AST  157*   ALT  209*     Lab Results   Component Value Date    INR 1.37 03/06/2017    INR 1.45 03/05/2017    INR 1.62 03/05/2017    INR 1.94 03/04/2017    INR 1.81 03/04/2017    INR 2.19 03/04/2017    INR 1.84 03/04/2017    INR 1.81 03/04/2017    INR 2.05 03/04/2017    INR 1.67 03/04/2017    INR 1.82 03/03/2017    INR 1.73 03/03/2017           Roselia Bryson, CNP pager [phone]  Diabetes Management Job Code [id number]

## 2017-03-09 NOTE — PROGRESS NOTES
CLINICAL NUTRITION SERVICES - REASSESSMENT NOTE     Nutrition Prescription    Malnutrition Status:    Non-severe malnutrition in the context of acute on chronic illness    Recommendations already ordered by Registered Dietitian (RD):  Continue Ensure Clear with meals and for HS snack     Future/Additional Recommendations:  No need to re-start calorie counts, unless PO intake declines      High protein food choices with meals to help meet high needs post-transplant over the next 6-8 weeks.     Heart-healthy diet (low saturated fat, low sodium, high fiber) and food safety precautions long term due to immunosuppression regimen post-transplant         EVALUATION OF THE PROGRESS TOWARD GOALS   Diet: Regular + Ensure Clear    Intake: Eating well, good appetite - typically 100% of meals being recorded.       Calorie counts:  3/7 - 1987 kcal, 92 grams protein  3/8 - 2388 kcal, 123 grams protein (met 100% needs)    NEW FINDINGS   Liver Tx 3/4/17    Kidney fxn improving    Na 131 (low)    -300's ->Sliding scale, long acting insulin and meal based insulin of 1 unit per 8 grams of CHO, Endo following    Lowest BW this admission = 96.6 kg (3/5/17).  Down ~3 kg (3%) in ~2 weeks.     MALNUTRITION  % Intake: No decreased intake noted  % Weight Loss: Up to 1-2% in 1 week (non-severe)  Subcutaneous Fat Loss: None observed  Muscle Loss: Facial & jaw region:  moderate, Thoracic region (clavicle, acromium bone, deltoid, trapezius, pectoral): mild and Upper arm (bicep, tricep):  mild  Fluid Accumulation/Edema: Mild, 2+ LE  Malnutrition Diagnosis: Non-severe malnutrition in the context of acute on chronic illness    Previous Goals   Patient to consume % of nutritionally adequate meal trays TID, or the equivalent with supplements/snacks.  Evaluation: Met    Previous Nutrition Diagnosis  Inadequate oral intake  Evaluation: Improving    CURRENT NUTRITION DIAGNOSIS  Predicted inadequate nutrient intake (kcal/pro) related to high  assessed energy and protein needs    INTERVENTIONS  Implementation  Nutrition education for nutrition relationship to health/disease -> reviewed post-transplant diet guidelines with patient: high protein needs x 2 months, heart-healthy diet, food safety precautions.     Encouraged ongoing great PO intake to help patient consistently meet nutritional needs.     Goals  Patient to consume % of nutritionally adequate meal trays TID, or the equivalent with supplements/snacks.    Monitoring/Evaluation  Progress toward goals will be monitored and evaluated per protocol.    Janice Salomon MS, RD, LD  Pager [phone]

## 2017-03-10 ENCOUNTER — APPOINTMENT (OUTPATIENT)
Dept: OCCUPATIONAL THERAPY | Facility: CLINIC | Age: 52
DRG: 005 | End: 2017-03-10
Payer: COMMERCIAL

## 2017-03-10 VITALS
WEIGHT: 216.9 LBS | OXYGEN SATURATION: 100 % | DIASTOLIC BLOOD PRESSURE: 76 MMHG | SYSTOLIC BLOOD PRESSURE: 127 MMHG | HEIGHT: 72 IN | HEART RATE: 86 BPM | RESPIRATION RATE: 16 BRPM | BODY MASS INDEX: 29.38 KG/M2 | TEMPERATURE: 98.6 F

## 2017-03-10 PROBLEM — D84.9 IMMUNOSUPPRESSED STATUS (H): Status: ACTIVE | Noted: 2017-03-10

## 2017-03-10 PROBLEM — N17.9 ACUTE KIDNEY INJURY (H): Status: ACTIVE | Noted: 2017-03-10

## 2017-03-10 LAB
ALBUMIN SERPL-MCNC: 2.5 G/DL (ref 3.4–5)
ALP SERPL-CCNC: 97 U/L (ref 40–150)
ALT SERPL W P-5'-P-CCNC: 105 U/L (ref 0–70)
ANION GAP SERPL CALCULATED.3IONS-SCNC: 8 MMOL/L (ref 3–14)
AST SERPL W P-5'-P-CCNC: 51 U/L (ref 0–45)
BASOPHILS # BLD AUTO: 0 10E9/L (ref 0–0.2)
BASOPHILS NFR BLD AUTO: 0.1 %
BILIRUB DIRECT SERPL-MCNC: 2.9 MG/DL (ref 0–0.2)
BILIRUB SERPL-MCNC: 3.6 MG/DL (ref 0.2–1.3)
BUN SERPL-MCNC: 65 MG/DL (ref 7–30)
CALCIUM SERPL-MCNC: 7.9 MG/DL (ref 8.5–10.1)
CHLORIDE SERPL-SCNC: 99 MMOL/L (ref 94–109)
CO2 SERPL-SCNC: 25 MMOL/L (ref 20–32)
CREAT SERPL-MCNC: 1.16 MG/DL (ref 0.66–1.25)
CROSSMATCH RESULT: NORMAL
DIFFERENTIAL METHOD BLD: ABNORMAL
EOSINOPHIL # BLD AUTO: 0.3 10E9/L (ref 0–0.7)
EOSINOPHIL NFR BLD AUTO: 3.3 %
ERYTHROCYTE [DISTWIDTH] IN BLOOD BY AUTOMATED COUNT: 16.1 % (ref 10–15)
GFR SERPL CREATININE-BSD FRML MDRD: 66 ML/MIN/1.7M2
GLUCOSE SERPL-MCNC: 155 MG/DL (ref 70–99)
HCT VFR BLD AUTO: 25.3 % (ref 40–53)
HGB BLD-MCNC: 8.8 G/DL (ref 13.3–17.7)
IMM GRANULOCYTES # BLD: 0 10E9/L (ref 0–0.4)
IMM GRANULOCYTES NFR BLD: 0.4 %
LYMPHOCYTES # BLD AUTO: 0.4 10E9/L (ref 0.8–5.3)
LYMPHOCYTES NFR BLD AUTO: 5.1 %
MAGNESIUM SERPL-MCNC: 1.9 MG/DL (ref 1.6–2.3)
MCH RBC QN AUTO: 32.1 PG (ref 26.5–33)
MCHC RBC AUTO-ENTMCNC: 34.8 G/DL (ref 31.5–36.5)
MCV RBC AUTO: 92 FL (ref 78–100)
MONOCYTES # BLD AUTO: 0.3 10E9/L (ref 0–1.3)
MONOCYTES NFR BLD AUTO: 3.2 %
NEUTROPHILS # BLD AUTO: 6.9 10E9/L (ref 1.6–8.3)
NEUTROPHILS NFR BLD AUTO: 87.9 %
NRBC # BLD AUTO: 0 10*3/UL
NRBC BLD AUTO-RTO: 0 /100
PHOSPHATE SERPL-MCNC: 2.1 MG/DL (ref 2.5–4.5)
PLATELET # BLD AUTO: 60 10E9/L (ref 150–450)
POTASSIUM SERPL-SCNC: 4.4 MMOL/L (ref 3.4–5.3)
PROT SERPL-MCNC: 4.6 G/DL (ref 6.8–8.8)
RBC # BLD AUTO: 2.74 10E12/L (ref 4.4–5.9)
SODIUM SERPL-SCNC: 132 MMOL/L (ref 133–144)
TACROLIMUS BLD-MCNC: 3.7 UG/L (ref 5–15)
TME LAST DOSE: ABNORMAL H
WBC # BLD AUTO: 7.9 10E9/L (ref 4–11)

## 2017-03-10 PROCEDURE — 80197 ASSAY OF TACROLIMUS: CPT | Performed by: PHYSICIAN ASSISTANT

## 2017-03-10 PROCEDURE — 80053 COMPREHEN METABOLIC PANEL: CPT | Performed by: PHYSICIAN ASSISTANT

## 2017-03-10 PROCEDURE — 25000132 ZZH RX MED GY IP 250 OP 250 PS 637: Performed by: SURGERY

## 2017-03-10 PROCEDURE — 84100 ASSAY OF PHOSPHORUS: CPT | Performed by: PHYSICIAN ASSISTANT

## 2017-03-10 PROCEDURE — 87181 SC STD AGAR DILUTION PER AGT: CPT | Performed by: PHYSICIAN ASSISTANT

## 2017-03-10 PROCEDURE — 25000131 ZZH RX MED GY IP 250 OP 636 PS 637: Performed by: PHYSICIAN ASSISTANT

## 2017-03-10 PROCEDURE — 00000146 ZZHCL STATISTIC GLUCOSE BY METER IP

## 2017-03-10 PROCEDURE — 25000132 ZZH RX MED GY IP 250 OP 250 PS 637: Performed by: TRANSPLANT SURGERY

## 2017-03-10 PROCEDURE — P9047 ALBUMIN (HUMAN), 25%, 50ML: HCPCS | Performed by: PHYSICIAN ASSISTANT

## 2017-03-10 PROCEDURE — 25000132 ZZH RX MED GY IP 250 OP 250 PS 637: Performed by: INTERNAL MEDICINE

## 2017-03-10 PROCEDURE — 97530 THERAPEUTIC ACTIVITIES: CPT | Mod: GO

## 2017-03-10 PROCEDURE — 87081 CULTURE SCREEN ONLY: CPT | Performed by: PHYSICIAN ASSISTANT

## 2017-03-10 PROCEDURE — 25000132 ZZH RX MED GY IP 250 OP 250 PS 637: Performed by: PHYSICIAN ASSISTANT

## 2017-03-10 PROCEDURE — 83735 ASSAY OF MAGNESIUM: CPT | Performed by: PHYSICIAN ASSISTANT

## 2017-03-10 PROCEDURE — 25000128 H RX IP 250 OP 636: Performed by: PHYSICIAN ASSISTANT

## 2017-03-10 PROCEDURE — 85025 COMPLETE CBC W/AUTO DIFF WBC: CPT | Performed by: PHYSICIAN ASSISTANT

## 2017-03-10 PROCEDURE — 36592 COLLECT BLOOD FROM PICC: CPT | Performed by: PHYSICIAN ASSISTANT

## 2017-03-10 PROCEDURE — 40000133 ZZH STATISTIC OT WARD VISIT

## 2017-03-10 PROCEDURE — 82248 BILIRUBIN DIRECT: CPT | Performed by: PHYSICIAN ASSISTANT

## 2017-03-10 PROCEDURE — 25000131 ZZH RX MED GY IP 250 OP 636 PS 637: Performed by: TRANSPLANT SURGERY

## 2017-03-10 RX ORDER — SULFAMETHOXAZOLE AND TRIMETHOPRIM 400; 80 MG/1; MG/1
1 TABLET ORAL DAILY
Qty: 30 TABLET | Refills: 5 | Status: SHIPPED | OUTPATIENT
Start: 2017-03-10 | End: 2017-06-15

## 2017-03-10 RX ORDER — AMOXICILLIN 250 MG
2 CAPSULE ORAL 2 TIMES DAILY
Qty: 100 TABLET | Refills: 1 | Status: SHIPPED | OUTPATIENT
Start: 2017-03-10 | End: 2017-06-07

## 2017-03-10 RX ORDER — OXYCODONE HYDROCHLORIDE 5 MG/1
5-10 TABLET ORAL EVERY 4 HOURS PRN
Qty: 40 TABLET | Refills: 0 | Status: ON HOLD | OUTPATIENT
Start: 2017-03-10 | End: 2017-09-08

## 2017-03-10 RX ORDER — CYCLOBENZAPRINE HCL 10 MG
10 TABLET ORAL 3 TIMES DAILY PRN
Qty: 30 TABLET | Refills: 0 | Status: ON HOLD | OUTPATIENT
Start: 2017-03-10 | End: 2017-03-26

## 2017-03-10 RX ORDER — ASPIRIN 325 MG
325 TABLET ORAL DAILY
Qty: 180 TABLET | Refills: 4 | Status: ON HOLD | OUTPATIENT
Start: 2017-03-10 | End: 2017-09-08

## 2017-03-10 RX ORDER — VALGANCICLOVIR 450 MG/1
450 TABLET, FILM COATED ORAL DAILY
Qty: 60 TABLET | Refills: 5 | Status: ON HOLD | OUTPATIENT
Start: 2017-03-10 | End: 2017-09-08

## 2017-03-10 RX ORDER — TACROLIMUS 1 MG/1
3 CAPSULE ORAL 2 TIMES DAILY
Qty: 180 CAPSULE | Refills: 11 | Status: SHIPPED | OUTPATIENT
Start: 2017-03-10 | End: 2017-03-10

## 2017-03-10 RX ORDER — ONDANSETRON 4 MG/1
4 TABLET, ORALLY DISINTEGRATING ORAL EVERY 8 HOURS PRN
Qty: 30 TABLET | Refills: 0 | Status: SHIPPED | OUTPATIENT
Start: 2017-03-10 | End: 2017-10-11

## 2017-03-10 RX ORDER — MULTIPLE VITAMINS W/ MINERALS TAB 9MG-400MCG
1 TAB ORAL DAILY
Qty: 30 EACH | Refills: 11 | Status: ON HOLD | OUTPATIENT
Start: 2017-03-10 | End: 2017-09-08

## 2017-03-10 RX ORDER — GABAPENTIN 300 MG/1
600 CAPSULE ORAL AT BEDTIME
Qty: 60 CAPSULE | Refills: 0 | Status: ON HOLD | OUTPATIENT
Start: 2017-03-10 | End: 2017-03-26

## 2017-03-10 RX ORDER — TACROLIMUS 1 MG/1
4 CAPSULE ORAL 2 TIMES DAILY
Qty: 180 CAPSULE | Refills: 11 | Status: SHIPPED | OUTPATIENT
Start: 2017-03-10 | End: 2017-03-17

## 2017-03-10 RX ORDER — MYCOPHENOLATE MOFETIL 250 MG/1
1000 CAPSULE ORAL 2 TIMES DAILY
Qty: 240 CAPSULE | Refills: 11 | Status: SHIPPED | OUTPATIENT
Start: 2017-03-10 | End: 2017-03-27

## 2017-03-10 RX ADMIN — TACROLIMUS 3 MG: 1 CAPSULE ORAL at 08:14

## 2017-03-10 RX ADMIN — SULFAMETHOXAZOLE AND TRIMETHOPRIM 1 TABLET: 400; 80 TABLET ORAL at 08:14

## 2017-03-10 RX ADMIN — OXYCODONE HYDROCHLORIDE 10 MG: 5 TABLET ORAL at 02:51

## 2017-03-10 RX ADMIN — OXYCODONE HYDROCHLORIDE 10 MG: 5 TABLET ORAL at 09:07

## 2017-03-10 RX ADMIN — CYCLOBENZAPRINE HYDROCHLORIDE 10 MG: 10 TABLET, FILM COATED ORAL at 00:56

## 2017-03-10 RX ADMIN — CLOTRIMAZOLE 1 TROCHE: 10 LOZENGE ORAL at 08:14

## 2017-03-10 RX ADMIN — CYCLOBENZAPRINE HYDROCHLORIDE 10 MG: 10 TABLET, FILM COATED ORAL at 09:07

## 2017-03-10 RX ADMIN — ASPIRIN 325 MG ORAL TABLET 325 MG: 325 PILL ORAL at 08:09

## 2017-03-10 RX ADMIN — SENNOSIDES AND DOCUSATE SODIUM 2 TABLET: 8.6; 5 TABLET ORAL at 08:14

## 2017-03-10 RX ADMIN — ALBUMIN (HUMAN) 12.5 G: 12.5 SOLUTION INTRAVENOUS at 08:03

## 2017-03-10 RX ADMIN — INSULIN HUMAN 20 UNITS: 100 INJECTION, SUSPENSION SUBCUTANEOUS at 08:13

## 2017-03-10 RX ADMIN — VALGANCICLOVIR HYDROCHLORIDE 450 MG: 450 TABLET ORAL at 08:14

## 2017-03-10 RX ADMIN — MYCOPHENOLATE MOFETIL 1000 MG: 250 CAPSULE ORAL at 08:14

## 2017-03-10 RX ADMIN — CLOTRIMAZOLE 1 TROCHE: 10 LOZENGE ORAL at 14:02

## 2017-03-10 RX ADMIN — MULTIPLE VITAMINS W/ MINERALS TAB 1 TABLET: TAB at 08:14

## 2017-03-10 RX ADMIN — OMEPRAZOLE 20 MG: 20 CAPSULE, DELAYED RELEASE ORAL at 08:14

## 2017-03-10 RX ADMIN — SODIUM BICARBONATE 650 MG TABLET 1300 MG: at 08:14

## 2017-03-10 NOTE — PROGRESS NOTES
Diabetes Consult Daily  Progress Note          Assessment/Plan:   Camacho Bhagat is a 51 year old with uncontrolled type 2 diabetes and with end stage liver disease and listed for transplant, admitted to [facility] 2/21/2017 with worsening encephalopathy and concern for infection. He was experiencing persistent hyperglycemia in the setting of likely omitted aspart, building glucose toxicity, and potentially infection (SBP negative) .   Now s/p DDOLT on (3/4/2017),     Taper schedule for steroids completed yesterday, will still have residual glycemic effects today but waning  Will need to monitor BG closely today with increase of lantus to 45 units, D/C of prednisone today, but NPH 20 for steroid coverage was given.     BG trend are improved With increase of lantus and bedtime correction intensity last evening but still in mid 200's with steroid glycemic effect  D/C of prednisone will likely greatly reduce insulin needs   today on 45 units of lantus (and influenced by steroid effects)  Aspart correction scale and meal coverage will be decreased for discharge in anticipation of dissipating steroid glycemic effects  Will need to delay Discharge until action of 0800 AM NPH dose has waned and BG stable, discussed with Primary Team  BG of 349 @1415 covered with 4 units Aspart,  Instructed patient to take next correction no earlier than 1800 today and may now Discharge       Plan:  Today Hold AM Carb coverage monitor BG  every hour until further notice   Hold lunch Carb coverage today  Correction doses PRN per this provider's order remainder of day  Discharge PLAN  Lantus 45 units every day, he will monitor fasting Blood glucoses for downward trends (BG <80) and call his endocrinologist for instructions  change meal aspart to 1 unit per10 grams of CHO   Asapet Medium intensity correction 1 unit per 50 mg/dl BG >140  AC TID and BG >200 HS  BGM AC, HS   Follow up with his endocrinologist ( Dr. Morrissey) early  next week           Interval History:   Last dose of steroid was yesterday, however NPH for steroid coverage given today  BG of 349 @ 1415 covered with 4 units Aspart   on 45 units of lantus and still under steroid influence, will not change at this time      Taper schedule for steroids completed yesterday, will still have residual glycemic effects today but waning  Appetite has imptroved along with PO intake no respiratory C/O, voiding without difficulty, afebrile  Discussed with  Him need for insulin coverage of food to control BG and danger of taking too much lantus if unable to eat and need for less insulin with kidney dysfunction     States he plans to D/C directly home and wants to leave this afternoon. Will need to delay Discharge until action of 0800 AM NPH dose has waned and BG stable, discussed with Primary Team and Staff RN        Recent Labs  Lab 03/10/17  1049 03/10/17  0833 03/10/17  0652 03/10/17  0244 03/09/17  2149 03/09/17  1658 03/09/17  1137  03/09/17  0652  03/08/17  0649  03/07/17  0633  03/06/17  0329  03/05/17  1707   GLC  --   --  155*  --   --   --   --   --  303*  --  116*  --  131*  --  111*  --  204*   * 150*  --  218* 240* 234* 336*  < >  --   < >  --   < >  --   < >  --   < >  --    < > = values in this interval not displayed.            Review of Systems:      please see interval hsitory       Medications:       Active Diet Order      Advance Diet as Tolerated: Regular Diet Adult     Physical Exam:  Gen: Alert,  Resting in bed, NAD   HEENT: NC/AT, mucous membranes are moist, still jaudice   Resp: Unlabored, no cough  Ext: moving all extremities, mild edema  Neuro:oriented x3, communicating clearly  /87 (BP Location: Left arm)  Pulse 77  Temp 98.1  F (36.7  C) (Oral)  Resp 16  Ht 1.829 m (6')  Wt 98.4 kg (216 lb 14.4 oz)  SpO2 99%  BMI 29.42 kg/m2           Data:     Lab Results   Component Value Date    A1C 9.4 02/16/2017    A1C 6.2 12/14/2016    A1C 6.1 10/27/2016     A1C 8.3 07/02/2012    A1C 8.5 07/31/2009              CBC RESULTS:   Recent Labs   Lab Test  03/06/17   0329   WBC  4.3   RBC  2.58*   HGB  8.4*   HCT  23.3*   MCV  90   MCH  32.6   MCHC  36.1   RDW  17.4*   PLT  23*     Recent Labs   Lab Test  03/06/17   0329  03/05/17   1707   NA  139  139   POTASSIUM  3.7  3.6   CHLORIDE  102  105   CO2  22  22   ANIONGAP  15*  11   GLC  111*  204*   BUN  103*  98*   CR  3.28*  2.98*   JACOB  7.6*  7.2*     Liver Function Studies -   Recent Labs   Lab Test  03/06/17   0329   PROTTOTAL  4.3*   ALBUMIN  2.4*   BILITOTAL  5.2*   ALKPHOS  69   AST  157*   ALT  209*     Lab Results   Component Value Date    INR 1.37 03/06/2017    INR 1.45 03/05/2017    INR 1.62 03/05/2017    INR 1.94 03/04/2017    INR 1.81 03/04/2017    INR 2.19 03/04/2017    INR 1.84 03/04/2017    INR 1.81 03/04/2017    INR 2.05 03/04/2017    INR 1.67 03/04/2017    INR 1.82 03/03/2017    INR 1.73 03/03/2017           Roselia Bryson, CNP pager [phone]  Diabetes Management Job Code [id number]

## 2017-03-10 NOTE — PROGRESS NOTES
Care Coordinator- Discharge Planning     Admission Date/Time:  2/21/2017  Attending MD:  Jovan Tran MD     Data  Date of initial CC assessment:  3.10.17  Chart reviewed, discussed with interdisciplinary team.   Patient was admitted for:   1. Hepatic encephalopathy (H)    2. Hyperkalemia    3. Biliary cirrhosis (H)    4. Hyperammonemia (H)    5. Acute renal failure, unspecified acute renal failure type (H)    6. Altered mental status, unspecified altered mental status type    7. Hyponatremia    51M cirrhosis CASTAÑEDA POD#5 OLT   Graft function: Good function. POD 1 US unable to visualize HA. CTA performed, main, left and right central HA patent, portal vein patent.   -LFTs trending down--per CARLOS Mondragon, note 3/9/17.     Assessment  Concerns with insurance coverage for discharge needs: None identified  Current Living Situation: Patient lives with family.  Support System: Supportive  Services Involved: Pt refuses Home Nurse and will have family drive him [place] [facility]  Transportation: Car and Family or Friend will provide  Barriers to Discharge: drain output      Coordination of Care and Referrals: Provided patient/family with options for ATC visits Huy 3/12 and Monday 3/13.      Plan  Anticipated Discharge Date:  3.10.17  Anticipated Discharge Plan:  Home with family--wife/daughter and mom can drive and assist him.    CTS Handoff completed:  YES OP CC: Abril Doty--I informed pt he is to call her with questions/concerns or if he becomes ill with vomiting or diarrhea and he may need to be readmitted, no lifting over 10 lbs, no driving for 6 weeks and stay hydrated and he said he understands.    Danica Shearer RN

## 2017-03-10 NOTE — PROGRESS NOTES
Nephrology Brief  Patient recovering renal function. Cr to 1.2 today. Renal will sign off. Please call if any questions.    Doug Elaine  Nephrology Fellow  Pager: [phone]

## 2017-03-10 NOTE — PLAN OF CARE
Problem: Individualization  Goal: Patient Preferences  Outcome: Improving  Pt VSS, afebrile, no complaints of nausea this shift. Blood sugar was 219 at 0200 check. Pt c/o mid back pain, received Oxycodone x 2 and Flexiril x 1 this shift. Pt tolerating a regular diet and is doing well with fluid intake. Pt voiding clear and hardik urine and passing flatus. TABITHA drain has a significant amount of serosanguanous drainage this shift with 505mL drained; pt receiving albumin. Incision is CDI, approximated, minor abdominal discomfort reported by pt. Plan is to possible discharge today to home with physical therapy versus a TCU.

## 2017-03-10 NOTE — PROVIDER NOTIFICATION
Just now verbally notified endocrinology (#[id number]) that Camacho' FQLG=[id number]: told to give SS insulin now, and awaiting further recommendations...

## 2017-03-10 NOTE — PROGRESS NOTES
Reason for Assessment:  Nutrition education regarding carbohydrate counting  Diet History: Patient reports he has counted carbohydrate in the past   Nutrition Diagnosis:  Food- and nutrition-related knowledge deficit r/t limited knowledge of counting carbohydrate AEB pt self report of it being a long time ago  Interventions:  Provided instruction on counting carbohydrates  Provided handouts carbohydrate counting and your guide to carbohydrate counting  Goals:   Patient will verbalize understanding of food items that contain carbohydrate, serving sizes  Return demonstration was provided  Follow-up:    Patient to ask any further nutrition-related questions before discharge.  In addition, pt may request outpatient RD appointment.    Miya Villagran MS/RD/LD/CNSC  7A RD Pager: [phone]

## 2017-03-10 NOTE — PROGRESS NOTES
Calorie Counts  Intake recorded for: 3/9  Kcals: 334  Protein: 15g  # Meals Recorded: 50% cheeseburger and french fries with ketchup  # Supplements Recorded: 0

## 2017-03-10 NOTE — PLAN OF CARE
Problem: Goal Outcome Summary  Goal: Goal Outcome Summary  PT: Pt has made good progress over last few days. Pt is mod I sup<>sit using railing and states has one at home. Pt is mod I sit<.stand transfers. Pt able to ambulate with wh walker 350'x2 mod I and progressed to SBA/mod I on stairs with B rails. Pt has met PT goals, is safe to discharge to home. Pt states he has HEP from previous stays and will continue with HEP, declined home PT and will ask MD for OP PT if he feels he needs it.      Physical Therapy Discharge Summary     Reason for therapy discharge:    Discharged to home.     Progress towards therapy goal(s). See goals on Care Plan in [facility] electronic health record for goal details.  Goals met     Therapy recommendation(s):    Continue home exercise program.

## 2017-03-10 NOTE — PLAN OF CARE
Problem: Goal Outcome Summary  Goal: Goal Outcome Summary  Outcome: Therapy, progress toward functional goals as expected  OT/7A - Pt unable to verbalize abdominal precautions independently, provided education for increased compliance. Pt aware of how to obtain AE at home if wife is unable to locate AE through family members. Recommend sock aid, reacher, and long handled shoe horn. Limited by post surgical precautions and activity tolerance.     REC: D/C to home with assist from family PRN due to post surgical precautions.

## 2017-03-10 NOTE — PLAN OF CARE
Problem: Goal Outcome Summary  Goal: Goal Outcome Summary  Outcome: Improving  5560-4836: Afebrile. VSS on RA. Pt complaining of lower back pain, PRN PO Oxycodone given x2 and PRN PO Flexeril given x1. Pt tolerating regular diet, ate 100% of his dinner, denies nausea. BS checks AC and HS along with carb coverage. BS at 1700 was 234, covered with 4 units of Novolog per order parameters. BS at 2200 was 240, covered with 2 units of Novolog per order parameters. Lantus dose given at 1800, dose increased from 30 units to 45 units. Triple Lumen IJ SL, dressing changed this shift. TABITHA drain with large amounts of serosanguinous drainage, MD notified and aware, no additional interventions needed at this time, dressing changed this shift. Voiding adequate amounts via the urinal in the bathroom. Last BM was this morning, pt received scheduled dose of Senna this evening, pt also passing flatus. Incision c/d/i, approximated, no drainage, and open to air. Up independently, pt walked around the floor three times this shift. Possible discharge tomorrow. Call light in reach. Will continue to monitor and follow plan of care.

## 2017-03-11 LAB
BACTERIA SPEC CULT: NORMAL
Lab: NORMAL
MICRO REPORT STATUS: NORMAL
SPECIMEN SOURCE: NORMAL

## 2017-03-11 NOTE — PLAN OF CARE
Problem: Goal Outcome Summary  Goal: Goal Outcome Summary  Occupational Therapy Discharge Summary     Reason for therapy discharge:    Discharged to home.     Progress towards therapy goal(s). See goals on Care Plan in [facility] electronic health record for goal details.  Goals partially met.  Barriers to achieving goals:   discharge from facility.     Therapy recommendation(s):    Continue home exercise program.

## 2017-03-12 ENCOUNTER — INFUSION THERAPY VISIT (OUTPATIENT)
Dept: INFUSION THERAPY | Facility: CLINIC | Age: 52
End: 2017-03-12
Attending: INTERNAL MEDICINE
Payer: COMMERCIAL

## 2017-03-12 VITALS — HEART RATE: 75 BPM | DIASTOLIC BLOOD PRESSURE: 80 MMHG | RESPIRATION RATE: 16 BRPM | SYSTOLIC BLOOD PRESSURE: 135 MMHG

## 2017-03-12 DIAGNOSIS — Z94.4 LIVER REPLACED BY TRANSPLANT (H): Primary | ICD-10-CM

## 2017-03-12 LAB
ALBUMIN SERPL-MCNC: 2.3 G/DL (ref 3.4–5)
ALP SERPL-CCNC: 163 U/L (ref 40–150)
ALT SERPL W P-5'-P-CCNC: 66 U/L (ref 0–70)
ANION GAP SERPL CALCULATED.3IONS-SCNC: 7 MMOL/L (ref 3–14)
AST SERPL W P-5'-P-CCNC: 29 U/L (ref 0–45)
BASOPHILS # BLD AUTO: 0 10E9/L (ref 0–0.2)
BASOPHILS NFR BLD AUTO: 0.2 %
BILIRUB DIRECT SERPL-MCNC: 2.4 MG/DL (ref 0–0.2)
BILIRUB SERPL-MCNC: 3.1 MG/DL (ref 0.2–1.3)
BUN SERPL-MCNC: 51 MG/DL (ref 7–30)
CALCIUM SERPL-MCNC: 7.8 MG/DL (ref 8.5–10.1)
CHLORIDE SERPL-SCNC: 99 MMOL/L (ref 94–109)
CO2 SERPL-SCNC: 26 MMOL/L (ref 20–32)
CREAT SERPL-MCNC: 1.18 MG/DL (ref 0.66–1.25)
DIFFERENTIAL METHOD BLD: ABNORMAL
EOSINOPHIL # BLD AUTO: 0.3 10E9/L (ref 0–0.7)
EOSINOPHIL NFR BLD AUTO: 3.2 %
ERYTHROCYTE [DISTWIDTH] IN BLOOD BY AUTOMATED COUNT: 16.4 % (ref 10–15)
GFR SERPL CREATININE-BSD FRML MDRD: 65 ML/MIN/1.7M2
GLUCOSE SERPL-MCNC: 342 MG/DL (ref 70–99)
HCT VFR BLD AUTO: 24.7 % (ref 40–53)
HGB BLD-MCNC: 8.7 G/DL (ref 13.3–17.7)
IMM GRANULOCYTES # BLD: 0.1 10E9/L (ref 0–0.4)
IMM GRANULOCYTES NFR BLD: 0.8 %
LYMPHOCYTES # BLD AUTO: 0.3 10E9/L (ref 0.8–5.3)
LYMPHOCYTES NFR BLD AUTO: 3.2 %
MAGNESIUM SERPL-MCNC: 1.7 MG/DL (ref 1.6–2.3)
MCH RBC QN AUTO: 33.1 PG (ref 26.5–33)
MCHC RBC AUTO-ENTMCNC: 35.2 G/DL (ref 31.5–36.5)
MCV RBC AUTO: 94 FL (ref 78–100)
MONOCYTES # BLD AUTO: 0.7 10E9/L (ref 0–1.3)
MONOCYTES NFR BLD AUTO: 7.4 %
NEUTROPHILS # BLD AUTO: 7.4 10E9/L (ref 1.6–8.3)
NEUTROPHILS NFR BLD AUTO: 85.2 %
NRBC # BLD AUTO: 0 10*3/UL
NRBC BLD AUTO-RTO: 0 /100
PHOSPHATE SERPL-MCNC: 2.9 MG/DL (ref 2.5–4.5)
PLATELET # BLD AUTO: 109 10E9/L (ref 150–450)
POTASSIUM SERPL-SCNC: 5.7 MMOL/L (ref 3.4–5.3)
PROT SERPL-MCNC: 4.7 G/DL (ref 6.8–8.8)
RBC # BLD AUTO: 2.63 10E12/L (ref 4.4–5.9)
SODIUM SERPL-SCNC: 132 MMOL/L (ref 133–144)
TACROLIMUS BLD-MCNC: 3.6 UG/L (ref 5–15)
TME LAST DOSE: ABNORMAL H
WBC # BLD AUTO: 8.7 10E9/L (ref 4–11)

## 2017-03-12 PROCEDURE — 80197 ASSAY OF TACROLIMUS: CPT | Performed by: INTERNAL MEDICINE

## 2017-03-12 PROCEDURE — 85025 COMPLETE CBC W/AUTO DIFF WBC: CPT | Performed by: INTERNAL MEDICINE

## 2017-03-12 PROCEDURE — 80076 HEPATIC FUNCTION PANEL: CPT | Performed by: INTERNAL MEDICINE

## 2017-03-12 PROCEDURE — 84100 ASSAY OF PHOSPHORUS: CPT | Performed by: INTERNAL MEDICINE

## 2017-03-12 PROCEDURE — 25000128 H RX IP 250 OP 636: Mod: ZF | Performed by: INTERNAL MEDICINE

## 2017-03-12 PROCEDURE — 83735 ASSAY OF MAGNESIUM: CPT | Performed by: INTERNAL MEDICINE

## 2017-03-12 PROCEDURE — 80048 BASIC METABOLIC PNL TOTAL CA: CPT | Performed by: INTERNAL MEDICINE

## 2017-03-12 PROCEDURE — 96374 THER/PROPH/DIAG INJ IV PUSH: CPT

## 2017-03-12 RX ORDER — FUROSEMIDE 10 MG/ML
40 INJECTION INTRAMUSCULAR; INTRAVENOUS ONCE
Status: COMPLETED | OUTPATIENT
Start: 2017-03-12 | End: 2017-03-12

## 2017-03-12 RX ADMIN — FUROSEMIDE 40 MG: 10 INJECTION, SOLUTION INTRAVENOUS at 12:06

## 2017-03-12 NOTE — PROGRESS NOTES
"Camacho Bhagat came to [facility] today for a lab and assess following a liver transplant on 3/4/17.      Discharge date: 3/10/17  Transplant coordinator: Ralph Villegas RN  Phone number patient can be reached at: see snapshot       Physical Assessment:  See physical assessment located under \"Document Flowsheets\".  Incision site: staples; intact   Lines: TABITHA lrq  325cc light pink thin drainage emptied from TABITHA while here at appointment  Crowe: no  Urine clarity: not asked  Hydration: reminded pt to drink 2liters daily.   Nutrition: appetite picking up   Last BM: not asked  Pain: lower back spasms -very severe, he states they have been like this since his liver transplant. Taking flexeril and 1 oxycodone this am at home which were not very helpful.  Encouraged to take 2oxycodone at a time, on a schedule. Rubbed his back, this helped immensely . His mom plans to buy a back rubber on [employer].     Laboratory tests: Standard labs drawn. Potassium 5.7     patient given Lasix 40mg IVP., and instructed to avoid high potassium foods. He states he has high potassium issues in the past.                                                                      Glucose 348     Pt took his lantus 45units while here. He states he has not taken his Lantus for 2days; unclear why as he is an experienced diabetic.                                                                                              He states his  am glucose at home this am was 125 so he had not been thinking about his glucoses or insulin.                                                                                                He plans to bring his home glucometer to his ATC appointment tomorrow to compare readings.                                                                                                He does feel his low blood sugars he states. We discussed how he is currently in a state of change with all his medical                                                   "                                                          Changes , so his insulin doses may need to be adjusted accordingly.    Plan of care for today: lab, physical assess.     Medication changes: no changes. Prograf level drawn late today so result woun't be available until tomorrow.   Set up medications using his med card, with his mother, who stays with him currently.    Medications administered:  Administrations This Visit     furosemide (LASIX) injection 40 mg     Admin Date Action Dose Route Administered By             03/12/2017 Given 40 mg Intravenous Cassandra Templeton RN                          Patient education:    The following teaching topics were addressed: Good handwashing, what rejection is    Mother and pt  verbalized understanding and all questions answered.    Drug level:  prograf level today not back yet--was sent late.  Face to face time: 30min  Discharge Plan    Pt will follow up with ATC in am for liver transplant day 1.  Discharge instructions reviewed with patient: YES  Patient/Representative verbalized understanding, all questions answered: YES    Discharged from unit at 1130am with whom: mother to home..    Kathy Ford RN

## 2017-03-12 NOTE — MR AVS SNAPSHOT
After Visit Summary   3/12/2017    Camacho Bhagat    MRN: [id number]           Patient Information     Date Of Birth          1965        Visit Information        Provider Department      3/12/2017 9:30 AM UC A ATC; [facility]        Today's Diagnoses     Liver replaced by transplant (H)    -  1       Follow-ups after your visit        Your next 10 appointments already scheduled     Mar 13, 2017  9:00 AM CDT   (Arrive by 8:45 AM)   New Transplant Visit with UC SPEC INFUSION, UC 42 ATC   [facility] ([facility])    [address]  [address]  [facility] [address] [phone]            Apr 24, 2017  1:00 PM CDT   (Arrive by 12:45 PM)   Return General Liver with Toñito Camejo MD   [facility] ([facility])    [address]  [address]  [facility] [address] [phone]              Who to contact     If you have questions or need follow up information about today's clinic visit or your schedule please contact [facility] directly at [phone].  Normal or non-critical lab and imaging results will be communicated to you by Reebeehart, letter or phone within 4 business days after the clinic has received the results. If you do not hear from us within 7 days, please contact the clinic through Reebeehart or phone. If you have a critical or abnormal lab result, we will notify you by phone as soon as possible.  Submit refill requests through Froont or call your pharmacy and they will forward the refill request to us. Please allow 3 business days for your refill to be completed.          Additional Information About Your Visit        MyChart Information     Froont gives you secure access to your electronic health record. If you see a primary care provider, you can also send  messages to your care team and make appointments. If you have questions, please call your primary care clinic.  If you do not have a primary care provider, please call [phone] and they will assist you.        Care EveryWhere ID     This is your Care EveryWhere ID. This could be used by other organizations to access your [place] medical records  [id number]        Your Vitals Were     Pulse Respirations                75 16           Blood Pressure from Last 3 Encounters:   03/12/17 135/80   03/10/17 127/76   02/21/17 147/59    Weight from Last 3 Encounters:   03/10/17 98.4 kg (216 lb 14.4 oz)   02/21/17 107.4 kg (236 lb 12.8 oz)   02/17/17 106.5 kg (234 lb 11.2 oz)              We Performed the Following     Basic metabolic panel     CBC with platelets differential     Hepatic panel     Magnesium     Phosphorus     Tacrolimus level        Primary Care Provider Office Phone # Fax #    Shay Kirkpatrick [phone]        [facility] [address]  [facility] [address]        Thank you!     Thank you for choosing [facility]  for your care. Our goal is always to provide you with excellent care. Hearing back from our patients is one way we can continue to improve our services. Please take a few minutes to complete the written survey that you may receive in the mail after your visit with us. Thank you!             Your Updated Medication List - Protect others around you: Learn how to safely use, store and throw away your medicines at www.disposemymeds.org.          This list is accurate as of: 3/12/17  3:25 PM.  Always use your most recent med list.                   Brand Name Dispense Instructions for use    aspirin 325 MG tablet     180 tablet    1 tablet (325 mg) by Oral or Feeding Tube route daily       clotrimazole 10 MG Lozg lozenge    MYCELEX    70 each    Place 1 lozenge (1 Beatrice) inside cheek 3 times daily       cyclobenzaprine 10  MG tablet    FLEXERIL    30 tablet    Take 1 tablet (10 mg) by mouth 3 times daily as needed for muscle spasms       gabapentin 300 MG capsule    NEURONTIN    60 capsule    Take 2 capsules (600 mg) by mouth At Bedtime       glucagon 1 MG Solr injection     1 each    Inject 1 mg Subcutaneous every 15 minutes as needed for low blood sugar (May repeat x 1 only)       * insulin aspart 100 UNIT/ML injection    NovoLOG PEN    3 mL    DOSE:  1 units per 8 grams of carbohydrate. With meals and snacks. Only chart total amount of units given.  Do not give if pre-prandial glucose is less than 60 mg/dL.       * insulin aspart 100 UNIT/ML injection    NovoLOG PEN    3 mL    DOSE:  1 units per 10 grams of carbohydrate.  Only chart total amount of units given. HOLD CARB COVERAGE FOR  03/10/17 . ENDOCRINE TEAM WILL GIVE ALTERNATE ORDERS PAGER [phone]  Do not give if pre-prandial glucose is less than 60 mg/dL.       * insulin aspart 100 UNIT/ML injection    NovoLOG PEN    3 mL    Inject 1-10 Units Subcutaneous 3 times daily (before meals) For Pre-Meal  - 189 give 1 unit.  For Pre-Meal  - 239 give 2 units.  For Pre-Meal  - 289 give 3 units.  For Pre-Meal  - 339 give 4 units.  For Pre-Meal -399 give 5 units. For Pre-Meal -449 give 6 units. For Pre-Meal BG = or > 450 give 7 units.       * insulin aspart 100 UNIT/ML injection    NovoLOG PEN    3 mL    Inject 1-7 Units Subcutaneous At Bedtime Bedtime Blood Glucose (BG) For  - 249 give 1 units.  For  - 299 give 2 units.  For  - 349 give 3 units. For - 399 give 4 units.  For BG = or > 400 give 5 units.       insulin glargine 100 UNIT/ML injection    LANTUS    3 mL    Inject 45 Units Subcutaneous every 24 hours       lactobacillus rhamnosus (GG) capsule     60 capsule    Take 1 capsule by mouth 2 times daily       multivitamin, therapeutic with minerals Tabs tablet     30 each    Take 1 tablet by mouth daily       mycophenolate 250 MG  capsule    CELLCEPT - GENERIC EQUIVALENT    240 capsule    Take 4 capsules (1,000 mg) by mouth 2 times daily       omeprazole 20 MG CR capsule    priLOSEC    60 capsule    Take 1 capsule (20 mg) by mouth 2 times daily (before meals)       ondansetron 4 MG ODT tab    ZOFRAN-ODT    30 tablet    Take 1 tablet (4 mg) by mouth every 8 hours as needed for nausea       oxyCODONE 5 MG IR tablet    ROXICODONE    40 tablet    Take 1-2 tablets (5-10 mg) by mouth every 4 hours as needed for moderate to severe pain       senna-docusate 8.6-50 MG per tablet    SENOKOT-S;PERICOLACE    100 tablet    Take 2 tablets by mouth 2 times daily       sulfamethoxazole-trimethoprim 400-80 MG per tablet    BACTRIM/SEPTRA    30 tablet    Take 1 tablet by mouth daily       tacrolimus 1 MG capsule    PROGRAF - GENERIC EQUIVALENT    180 capsule    Take 4 capsules (4 mg) by mouth 2 times daily       valGANciclovir 450 MG tablet    VALCYTE    60 tablet    Take 1 tablet (450 mg) by mouth daily       * Notice:  This list has 4 medication(s) that are the same as other medications prescribed for you. Read the directions carefully, and ask your doctor or other care provider to review them with you.

## 2017-03-13 ENCOUNTER — CARE COORDINATION (OUTPATIENT)
Dept: CARDIOLOGY | Facility: CLINIC | Age: 52
End: 2017-03-13

## 2017-03-13 ENCOUNTER — TELEPHONE (OUTPATIENT)
Dept: PHARMACY | Facility: CLINIC | Age: 52
End: 2017-03-13

## 2017-03-13 ENCOUNTER — APPOINTMENT (OUTPATIENT)
Dept: INFUSION THERAPY | Facility: CLINIC | Age: 52
End: 2017-03-13
Attending: TRANSPLANT SURGERY
Payer: COMMERCIAL

## 2017-03-13 VITALS
RESPIRATION RATE: 18 BRPM | WEIGHT: 231.6 LBS | SYSTOLIC BLOOD PRESSURE: 137 MMHG | DIASTOLIC BLOOD PRESSURE: 75 MMHG | BODY MASS INDEX: 31.41 KG/M2

## 2017-03-13 DIAGNOSIS — R60.9 EDEMA, UNSPECIFIED TYPE: ICD-10-CM

## 2017-03-13 DIAGNOSIS — Z94.4 STATUS POST LIVER TRANSPLANTATION (H): ICD-10-CM

## 2017-03-13 DIAGNOSIS — Z94.4 LIVER REPLACED BY TRANSPLANT (H): Primary | ICD-10-CM

## 2017-03-13 DIAGNOSIS — D84.9 IMMUNOSUPPRESSION (H): Primary | ICD-10-CM

## 2017-03-13 DIAGNOSIS — Z94.4 HISTORY OF LIVER TRANSPLANT (H): ICD-10-CM

## 2017-03-13 DIAGNOSIS — Z48.298 AFTERCARE FOLLOWING ORGAN TRANSPLANT: Primary | ICD-10-CM

## 2017-03-13 DIAGNOSIS — R52 PAIN: ICD-10-CM

## 2017-03-13 DIAGNOSIS — M54.50 LOW BACK PAIN AT MULTIPLE SITES: ICD-10-CM

## 2017-03-13 LAB
ALBUMIN SERPL-MCNC: 2.4 G/DL (ref 3.4–5)
ALP SERPL-CCNC: 190 U/L (ref 40–150)
ALT SERPL W P-5'-P-CCNC: 57 U/L (ref 0–70)
ANION GAP SERPL CALCULATED.3IONS-SCNC: 9 MMOL/L (ref 3–14)
AST SERPL W P-5'-P-CCNC: 27 U/L (ref 0–45)
BASOPHILS # BLD AUTO: 0 10E9/L (ref 0–0.2)
BASOPHILS NFR BLD AUTO: 0.1 %
BILIRUB DIRECT SERPL-MCNC: 2.3 MG/DL (ref 0–0.2)
BILIRUB SERPL-MCNC: 3.3 MG/DL (ref 0.2–1.3)
BUN SERPL-MCNC: 46 MG/DL (ref 7–30)
CALCIUM SERPL-MCNC: 8.3 MG/DL (ref 8.5–10.1)
CHLORIDE SERPL-SCNC: 97 MMOL/L (ref 94–109)
CO2 SERPL-SCNC: 25 MMOL/L (ref 20–32)
CREAT SERPL-MCNC: 1.48 MG/DL (ref 0.66–1.25)
DIFFERENTIAL METHOD BLD: ABNORMAL
EOSINOPHIL # BLD AUTO: 0.3 10E9/L (ref 0–0.7)
EOSINOPHIL NFR BLD AUTO: 3.3 %
ERYTHROCYTE [DISTWIDTH] IN BLOOD BY AUTOMATED COUNT: 16.5 % (ref 10–15)
GFR SERPL CREATININE-BSD FRML MDRD: 50 ML/MIN/1.7M2
GLUCOSE SERPL-MCNC: 137 MG/DL (ref 70–99)
HCT VFR BLD AUTO: 24.6 % (ref 40–53)
HGB BLD-MCNC: 9 G/DL (ref 13.3–17.7)
IMM GRANULOCYTES # BLD: 0.1 10E9/L (ref 0–0.4)
IMM GRANULOCYTES NFR BLD: 1.1 %
LYMPHOCYTES # BLD AUTO: 0.4 10E9/L (ref 0.8–5.3)
LYMPHOCYTES NFR BLD AUTO: 4.6 %
MAGNESIUM SERPL-MCNC: 1.6 MG/DL (ref 1.6–2.3)
MCH RBC QN AUTO: 33.3 PG (ref 26.5–33)
MCHC RBC AUTO-ENTMCNC: 36.6 G/DL (ref 31.5–36.5)
MCV RBC AUTO: 91 FL (ref 78–100)
MONOCYTES # BLD AUTO: 0.7 10E9/L (ref 0–1.3)
MONOCYTES NFR BLD AUTO: 7.2 %
NEUTROPHILS # BLD AUTO: 7.9 10E9/L (ref 1.6–8.3)
NEUTROPHILS NFR BLD AUTO: 83.7 %
NRBC # BLD AUTO: 0 10*3/UL
NRBC BLD AUTO-RTO: 0 /100
PHOSPHATE SERPL-MCNC: 2.8 MG/DL (ref 2.5–4.5)
PLATELET # BLD AUTO: 150 10E9/L (ref 150–450)
POTASSIUM SERPL-SCNC: 5.8 MMOL/L (ref 3.4–5.3)
PROT SERPL-MCNC: 5.1 G/DL (ref 6.8–8.8)
RBC # BLD AUTO: 2.7 10E12/L (ref 4.4–5.9)
SODIUM SERPL-SCNC: 130 MMOL/L (ref 133–144)
TACROLIMUS BLD-MCNC: 3 UG/L (ref 5–15)
TME LAST DOSE: ABNORMAL H
WBC # BLD AUTO: 9.5 10E9/L (ref 4–11)

## 2017-03-13 PROCEDURE — 99215 OFFICE O/P EST HI 40 MIN: CPT | Mod: ZF

## 2017-03-13 PROCEDURE — 80197 ASSAY OF TACROLIMUS: CPT | Performed by: TRANSPLANT SURGERY

## 2017-03-13 PROCEDURE — 85025 COMPLETE CBC W/AUTO DIFF WBC: CPT | Performed by: TRANSPLANT SURGERY

## 2017-03-13 PROCEDURE — 80076 HEPATIC FUNCTION PANEL: CPT | Performed by: TRANSPLANT SURGERY

## 2017-03-13 PROCEDURE — 83735 ASSAY OF MAGNESIUM: CPT | Performed by: TRANSPLANT SURGERY

## 2017-03-13 PROCEDURE — 80048 BASIC METABOLIC PNL TOTAL CA: CPT | Performed by: TRANSPLANT SURGERY

## 2017-03-13 PROCEDURE — 84100 ASSAY OF PHOSPHORUS: CPT | Performed by: TRANSPLANT SURGERY

## 2017-03-13 RX ORDER — PROCHLORPERAZINE MALEATE 5 MG
5-10 TABLET ORAL EVERY 6 HOURS PRN
Qty: 90 TABLET | Refills: 0 | Status: SHIPPED | OUTPATIENT
Start: 2017-03-13 | End: 2017-10-11

## 2017-03-13 RX ORDER — GABAPENTIN 300 MG/1
300 CAPSULE ORAL 3 TIMES DAILY
Status: DISCONTINUED | OUTPATIENT
Start: 2017-03-13 | End: 2017-03-13 | Stop reason: CLARIF

## 2017-03-13 RX ORDER — OXYCODONE HYDROCHLORIDE 10 MG/1
10 TABLET ORAL EVERY 4 HOURS PRN
Qty: 30 TABLET | Refills: 0 | Status: ON HOLD | OUTPATIENT
Start: 2017-03-13 | End: 2017-03-26

## 2017-03-13 RX ORDER — ACETAMINOPHEN 160 MG
1 TABLET,DISINTEGRATING ORAL 2 TIMES DAILY
Qty: 30 CAPSULE | Refills: 1 | Status: ON HOLD | OUTPATIENT
Start: 2017-03-13 | End: 2017-09-08

## 2017-03-13 RX ORDER — GABAPENTIN 300 MG/1
300 CAPSULE ORAL 3 TIMES DAILY
Qty: 90 CAPSULE | Refills: 1 | Status: SHIPPED | OUTPATIENT
Start: 2017-03-13 | End: 2017-06-27

## 2017-03-13 RX ORDER — FUROSEMIDE 20 MG
40 TABLET ORAL 2 TIMES DAILY
Qty: 30 TABLET | Refills: 0 | Status: SHIPPED | OUTPATIENT
Start: 2017-03-13 | End: 2017-03-16

## 2017-03-13 RX ORDER — CYCLOBENZAPRINE HCL 10 MG
10 TABLET ORAL 3 TIMES DAILY PRN
Qty: 90 TABLET | Refills: 1 | Status: SHIPPED | OUTPATIENT
Start: 2017-03-13 | End: 2017-04-28

## 2017-03-13 RX ORDER — CYCLOBENZAPRINE HCL 5 MG
10 TABLET ORAL 3 TIMES DAILY PRN
Qty: 42 TABLET | Refills: 0 | Status: ON HOLD | OUTPATIENT
Start: 2017-03-13 | End: 2017-03-26

## 2017-03-13 RX ORDER — LIDOCAINE 50 MG/G
1 PATCH TOPICAL EVERY 24 HOURS
Qty: 30 PATCH | Refills: 1 | Status: ON HOLD | OUTPATIENT
Start: 2017-03-13 | End: 2017-09-08

## 2017-03-13 NOTE — Clinical Note
Please assist pt with scheduling for him to have outpt physical therapy at [facility].   He is post liver transplant, orders in this enounter, but pt is not scheduled to be seen yet.  tks    He knows that you will call about day and time to start PT, hopefully next week.

## 2017-03-13 NOTE — PROGRESS NOTES
"Camacho Bhagat came to [facility] today for a lab and assess following a liver transplant on 3/4/17.      Discharge date: 3/10/17  Transplant coordinator: Abril Doty RN   Phone number patient can be reached at: [phone]      Physical Assessment:  See physical assessment located under \"Document Flowsheets\".  Incision site: C/D/I  Lines: TABITHA drain 450 cc output when emptied this AM.  Crowe: NA  Urine clarity: clear per patient--concentrated  Hydration: gallon per day per patient  Nutrition: appetite good   Last BM: yesterday, no issues with constipation per patient.  Pain: Back spasms with constant pain. Patient stated, \"If it wasn't for the back pain, it would be a walk in the park.\" Back pain is his only problem right now per patient.     Laboratory tests: Standard labs drawn.    Plan of care for today: Labs drawn. Results printed, copy given to patient and discussed. Medications reviewed with patient. Patient seen by Specialty Pharmacy, Dietician, Dr. Tran, and Abril Doty RN coordinator. Checklist completed. Patient has a great understanding of medications and their uses. Patient's main issue after transplant is back pain/spasms. Patient states that the only time the pain is minimized is when he is resting/laying down.     Orders per Dr. Tran:  -Refill Lactobacillus  -Change Zofran to Compazine  -Start lasix 40 mg BID  -Increase Flexerall to 1 tablet up to TID  -Increase Gabapentin up to 300 mg TID  -Lidoderm patches to back as needed  -Can take up to 10 mg oxycodone up to TID  -Abdominal Binder to abdomen  -Outpatient PT ordered for patient to begin  -XRAY ordered of back, possible MRI (once XRAY results reviewed)  -Abril Doty will make patient appt with Dr. Sanchez, neurosurgeon  -Walk 4 x daily    Medications administered:      Patient education:    The following teaching topics were addressed: Importance of drinking 2L of non-caffeinated fluids daily, Incisional care, Signs/symptoms of infection, " Good handwashing, Medications (purposes, doses and times of administration), Phone numbers to call with concenrs (Transplant coordinator, Unit [facility] and [facility]), 6D discharge check list, Plan of care and Drain care   Patient verbalized understanding and all questions answered.    Face to face time: 60 minutes  Discharge Plan    Pt will follow up with Dora Elizabeth NP Thursday in [facility], then Monday with Dr. Tran in Transplant Clinic.  Discharge instructions reviewed with patient: YES  Patient/Representative verbalized understanding, all questions answered: YES    Discharged from unit at 1210 with whom: mother to home.    Kyra Wallace RN

## 2017-03-13 NOTE — PROGRESS NOTES
Patient has clinic visit today so no post DC follow up call is needed. Patient is also a transplant patient            Follow-up Appointments           [facility]ult [facility] Follow-up and recommended labs and tests       [facility] (ph. [phone]) on Huy 3/12 and Monday 3/13.                 Follow up with transplant hepatology as directed by transplant surgeon.

## 2017-03-13 NOTE — PROGRESS NOTES
POST LIVER TRANSPLANT NUTRITION ASSESSMENT  Medical Nutrition Therapy    Visit Type: Follow up Assessment    referred by Dr. Tran for MNT related to Liver Transplant     Patient accompanied by mother    Nutrition Assessment:  Anthropometrics  Height: 72 in    BMI: 32.4       Weight: 231 lbs       IBW: 178 lbs  % IBW: 130  Adj/Dosing wt: 191 lbs/87 kg Wt hx: Last wt 2/21/17 236 lbs, wt loss of 5 lbs (2%) in 3 weeks.       Nutrition Prescription  Energy:  2175 - 2610 kcal (25-30 kcal/kg)  Justification: post txp   Protein:  130 - 174 grams protein (1.5-2 g/kg)  Justification: post txp         Fluid:   1ml/kcal or per MD        Diet Recall:  Patient reports appetite has been improving since d/c from hospital, currently eating 2.5 meals/day and drinking 1 Ensure Clear/day.     B: cream of wheat, cooking with water, encouraged pt to use milk  L: sandwich (meat & cheese)  D: varies, including a meat source     Nutrition Diagnosis:  Inadequate protein intake related to increased protein needs s/p transplant AEB pt report appetite improving, but not at baseline and estimated protein needs high at 130-174 g/day.    Nutrition Intervention/Recommendations:  Pt appeared somewhat uninterested in conversation with RD today, states he has heard the recommendations already. Per chart review, pt received education regarding CHO counting prior to discharge, mentions he is going to attend a class on CHO counting.    1. Discussed importance of consuming adequate calories and protein for 2 months post-txp to help heal and reduce muscle/fat wasting. Discussed sources of protein and ways to ensure he is increasing his protein intake. Encouraged use of milk with cooking cereals, incorporating snacks between meals if experiencing early satiety.  2. Reviewed importance of food safety and increased risk for food-borne illness post-txp. Also discussed potential need to monitor K+, CHO, and Na+ intakes d/t medication side effects. Noted  K+ elevated today.  3. Instructed pt to avoid herbal supplements and alternative medicine therapies d/t counteraction with immunosuppression and risk for rejection.     Nutrition Goals:  1. Include protein source at each meal & snack.  2. Follow a small, frequent meals pattern.    Nutrition Follow Up / Monitorin. Ongoing PO intake adequacy  2. Weight trends     Patient has RD contact information to call/email if needed.  Time spent with patient: 15  Minutes    Savi Bravo RD, LD    Joycelyn Reyes RD, LD

## 2017-03-13 NOTE — MR AVS SNAPSHOT
After Visit Summary   3/13/2017    Camacho Bhagat    MRN: [id number]           Patient Information     Date Of Birth          1965        Visit Information        Provider Department      3/13/2017 9:00 AM UC 42 ATC; UC SPEC INFUSION [facility] Specialty and Procedure        Today's Diagnoses     Aftercare following organ transplant    -  1    Edema, unspecified type        Low back pain at multiple sites        Status post liver transplantation (H)           Follow-ups after your visit        Your next 10 appointments already scheduled     Mar 16, 2017  9:00 AM CDT   (Arrive by 8:45 AM)   Return with Jovan Tran MD   [facility] Specialty and Procedure ([facility])    [address]  [address]  [facility] [facility] [address]   [phone]            Mar 16, 2017  9:00 AM CDT   (Arrive by 8:45 AM)   New Transplant Visit with UC SPEC INFUSION, UC 41 ATC   [facility] Specialty and Procedure ([facility])    [address]  [address]  [place] [facility] [address]   [phone]            Apr 24, 2017  1:00 PM CDT   (Arrive by 12:45 PM)   Return General Liver with Toñito Camejo MD   [facility] Hepatology ([facility])    [address]  [address]  [place] [address]   [phone]              Future tests that were ordered for you today     Open Standing Orders        Priority Remaining Interval Expires Ordered    Tacrolimus level Routine 99/99 2X Week 3/13/2018 3/13/2017    CBC with platelets Routine 99/99 2X Week 3/13/2018 3/13/2017    Basic metabolic panel Routine 99/99 2X Week 3/13/2018 3/13/2017    Phosphorus Routine 99/99 2X Week 3/13/2018 3/13/2017    Magnesium Routine 99/99 2X Week 3/13/2018 3/13/2017    Hepatic panel Routine 99/99 2X Week 3/13/2018 3/13/2017          Open Future Orders        Priority Expected Expires Ordered     MRI Lumbar spine w & w/o contrast Routine  6/11/2017 3/13/2017            Who to contact     If you have questions or need follow up information about today's clinic visit or your schedule please contact [facility] SPECIAL[facility] directly at [phone].  Normal or non-critical lab and imaging results will be communicated to you by [employer], letter or phone within 4 business days after the clinic has received the results. If you do not hear from us within 7 days, please contact the clinic through [employer] or phone. If you have a critical or abnormal lab result, we will notify you by phone as soon as possible.  Submit refill requests through [employer] or call your pharmacy and they will forward the refill request to us. Please allow 3 business days for your refill to be completed.          Additional Information About Your Visit        [employer] Information     [employer] gives you secure access to your electronic health record. If you see a primary care provider, you can also send messages to your care team and make appointments. If you have questions, please call your primary care clinic.  If you do not have a primary care provider, please call [phone] and they will assist you.        Care EveryWhere ID     This is your Care EveryWhere ID. This could be used by other organizations to access your [facility] medical records  [id number]        Your Vitals Were     Respirations BMI (Body Mass Index)                18 31.41 kg/m2           Blood Pressure from Last 3 Encounters:   03/13/17 137/75   03/12/17 135/80   03/10/17 127/76    Weight from Last 3 Encounters:   03/13/17 105.1 kg (231 lb 9.6 oz)   03/10/17 98.4 kg (216 lb 14.4 oz)   02/21/17 107.4 kg (236 lb 12.8 oz)              We Performed the Following     Basic metabolic panel     CBC with platelets differential     Hepatic panel     Magnesium     Phosphorus     Tacrolimus level     X-ray Lumbar spine G/E 4 vws*          Today's  Medication Changes          These changes are accurate as of: 3/13/17  1:29 PM.  If you have any questions, ask your nurse or doctor.               Start taking these medicines.        Dose/Directions    furosemide 20 MG tablet   Commonly known as:  LASIX   Used for:  Edema, unspecified type        Dose:  40 mg   Take 2 tablets (40 mg) by mouth 2 times daily   Quantity:  30 tablet   Refills:  0       LACTOBACILLUS EXTRA STRENGTH Caps   Used for:  Aftercare following organ transplant, Status post liver transplantation (H)        Dose:  1 capsule   Take 1 capsule by mouth 2 times daily   Quantity:  30 capsule   Refills:  1       lidocaine 5 % Patch   Commonly known as:  LIDODERM   Used for:  Low back pain at multiple sites        Dose:  1 patch   Place 1 patch onto the skin every 24 hours   Quantity:  30 patch   Refills:  1       prochlorperazine 5 MG tablet   Commonly known as:  COMPAZINE   Used for:  Aftercare following organ transplant, Status post liver transplantation (H)        Dose:  5-10 mg   Take 1-2 tablets (5-10 mg) by mouth every 6 hours as needed for nausea or vomiting   Quantity:  90 tablet   Refills:  0         These medicines have changed or have updated prescriptions.        Dose/Directions    * cyclobenzaprine 10 MG tablet   Commonly known as:  FLEXERIL   This may have changed:  Another medication with the same name was added. Make sure you understand how and when to take each.   Used for:  Bladder spasms        Dose:  10 mg   Take 1 tablet (10 mg) by mouth 3 times daily as needed for muscle spasms   Quantity:  30 tablet   Refills:  0       * cyclobenzaprine 10 MG tablet   Commonly known as:  FLEXERIL   This may have changed:  You were already taking a medication with the same name, and this prescription was added. Make sure you understand how and when to take each.   Used for:  Immunosuppression (H)   Changed by:  Jovan Tran MD        Dose:  10 mg   Take 1 tablet (10 mg) by mouth 3 times  daily as needed for muscle spasms   Quantity:  90 tablet   Refills:  1       * cyclobenzaprine 5 MG tablet   Commonly known as:  FLEXERIL   This may have changed:  You were already taking a medication with the same name, and this prescription was added. Make sure you understand how and when to take each.   Used for:  Low back pain at multiple sites        Dose:  10 mg   Take 2 tablets (10 mg) by mouth 3 times daily as needed for muscle spasms   Quantity:  42 tablet   Refills:  0       * gabapentin 300 MG capsule   Commonly known as:  NEURONTIN   This may have changed:  Another medication with the same name was added. Make sure you understand how and when to take each.   Used for:  Type 2 diabetes mellitus with diabetic polyneuropathy, unspecified long term insulin use status (H)        Dose:  600 mg   Take 2 capsules (600 mg) by mouth At Bedtime   Quantity:  60 capsule   Refills:  0       * gabapentin 300 MG capsule   Commonly known as:  NEURONTIN   This may have changed:  You were already taking a medication with the same name, and this prescription was added. Make sure you understand how and when to take each.   Used for:  Low back pain at multiple sites        Dose:  300 mg   Take 1 capsule (300 mg) by mouth 3 times daily   Quantity:  90 capsule   Refills:  1       * oxyCODONE 5 MG IR tablet   Commonly known as:  ROXICODONE   This may have changed:  Another medication with the same name was added. Make sure you understand how and when to take each.   Used for:  Liver transplant recipient (H)        Dose:  5-10 mg   Take 1-2 tablets (5-10 mg) by mouth every 4 hours as needed for moderate to severe pain   Quantity:  40 tablet   Refills:  0       * oxyCODONE 10 MG IR tablet   Commonly known as:  ROXICODONE   This may have changed:  You were already taking a medication with the same name, and this prescription was added. Make sure you understand how and when to take each.   Used for:  Immunosuppression (H)   Changed  by:  Jovan Tran MD        Dose:  10 mg   Take 1 tablet (10 mg) by mouth every 4 hours as needed for moderate to severe pain   Quantity:  30 tablet   Refills:  0       * Notice:  This list has 7 medication(s) that are the same as other medications prescribed for you. Read the directions carefully, and ask your doctor or other care provider to review them with you.         Where to get your medicines      These medications were sent to [facility] - [place], [place] - [address]  [address] [address]     Phone:  [phone]     cyclobenzaprine 10 MG tablet         These medications were sent to [facility] - [place], [place] - [address]  [address] [address]    Hours:  TRANSPLANT PHONE NUMBER [phone] Phone:  [phone]     cyclobenzaprine 5 MG tablet    furosemide 20 MG tablet    gabapentin 300 MG capsule    LACTOBACILLUS EXTRA STRENGTH Caps    lidocaine 5 % Patch    prochlorperazine 5 MG tablet         Some of these will need a paper prescription and others can be bought over the counter.  Ask your nurse if you have questions.     Bring a paper prescription for each of these medications     oxyCODONE 10 MG IR tablet                Primary Care Provider Office Phone # Fax #    Shay Kirkpatrick [phone]        [facility] [address] [place] [address]  [facility] [address]        Thank you!     Thank you for choosing [facility] SPECIALTY AND PROCEDURE  for your care. Our goal is always to provide you with excellent care. Hearing back from our patients is one way we can continue to improve our services. Please take a few minutes to complete the written survey that you may receive in the mail after your visit with us. Thank you!             Your Updated Medication List - Protect others around you: Learn how to safely use, store and throw away your  medicines at www.disposemymeds.org.          This list is accurate as of: 3/13/17  1:29 PM.  Always use your most recent med list.                   Brand Name Dispense Instructions for use    aspirin 325 MG tablet     180 tablet    1 tablet (325 mg) by Oral or Feeding Tube route daily       clotrimazole 10 MG Lozg lozenge    MYCELEX    70 each    Place 1 lozenge (1 Beatrice) inside cheek 3 times daily       * cyclobenzaprine 10 MG tablet    FLEXERIL    30 tablet    Take 1 tablet (10 mg) by mouth 3 times daily as needed for muscle spasms       * cyclobenzaprine 10 MG tablet    FLEXERIL    90 tablet    Take 1 tablet (10 mg) by mouth 3 times daily as needed for muscle spasms       * cyclobenzaprine 5 MG tablet    FLEXERIL    42 tablet    Take 2 tablets (10 mg) by mouth 3 times daily as needed for muscle spasms       furosemide 20 MG tablet    LASIX    30 tablet    Take 2 tablets (40 mg) by mouth 2 times daily       * gabapentin 300 MG capsule    NEURONTIN    60 capsule    Take 2 capsules (600 mg) by mouth At Bedtime       * gabapentin 300 MG capsule    NEURONTIN    90 capsule    Take 1 capsule (300 mg) by mouth 3 times daily       glucagon 1 MG Solr injection     1 each    Inject 1 mg Subcutaneous every 15 minutes as needed for low blood sugar (May repeat x 1 only)       * insulin aspart 100 UNIT/ML injection    NovoLOG PEN    3 mL    DOSE:  1 units per 8 grams of carbohydrate. With meals and snacks. Only chart total amount of units given.  Do not give if pre-prandial glucose is less than 60 mg/dL.       * insulin aspart 100 UNIT/ML injection    NovoLOG PEN    3 mL    DOSE:  1 units per 10 grams of carbohydrate.  Only chart total amount of units given. HOLD CARB COVERAGE FOR  03/10/17 . ENDOCRINE TEAM WILL GIVE ALTERNATE ORDERS PAGER [phone]  Do not give if pre-prandial glucose is less than 60 mg/dL.       * insulin aspart 100 UNIT/ML injection    NovoLOG PEN    3 mL    Inject 1-10 Units Subcutaneous 3 times daily (before  meals) For Pre-Meal  - 189 give 1 unit.  For Pre-Meal  - 239 give 2 units.  For Pre-Meal  - 289 give 3 units.  For Pre-Meal  - 339 give 4 units.  For Pre-Meal -399 give 5 units. For Pre-Meal -449 give 6 units. For Pre-Meal BG = or > 450 give 7 units.       * insulin aspart 100 UNIT/ML injection    NovoLOG PEN    3 mL    Inject 1-7 Units Subcutaneous At Bedtime Bedtime Blood Glucose (BG) For  - 249 give 1 units.  For  - 299 give 2 units.  For  - 349 give 3 units. For - 399 give 4 units.  For BG = or > 400 give 5 units.       insulin glargine 100 UNIT/ML injection    LANTUS    3 mL    Inject 45 Units Subcutaneous every 24 hours       LACTOBACILLUS EXTRA STRENGTH Caps     30 capsule    Take 1 capsule by mouth 2 times daily       lactobacillus rhamnosus (GG) capsule     60 capsule    Take 1 capsule by mouth 2 times daily       lidocaine 5 % Patch    LIDODERM    30 patch    Place 1 patch onto the skin every 24 hours       multivitamin, therapeutic with minerals Tabs tablet     30 each    Take 1 tablet by mouth daily       mycophenolate 250 MG capsule    CELLCEPT - GENERIC EQUIVALENT    240 capsule    Take 4 capsules (1,000 mg) by mouth 2 times daily       omeprazole 20 MG CR capsule    priLOSEC    60 capsule    Take 1 capsule (20 mg) by mouth 2 times daily (before meals)       ondansetron 4 MG ODT tab    ZOFRAN-ODT    30 tablet    Take 1 tablet (4 mg) by mouth every 8 hours as needed for nausea       * oxyCODONE 5 MG IR tablet    ROXICODONE    40 tablet    Take 1-2 tablets (5-10 mg) by mouth every 4 hours as needed for moderate to severe pain       * oxyCODONE 10 MG IR tablet    ROXICODONE    30 tablet    Take 1 tablet (10 mg) by mouth every 4 hours as needed for moderate to severe pain       prochlorperazine 5 MG tablet    COMPAZINE    90 tablet    Take 1-2 tablets (5-10 mg) by mouth every 6 hours as needed for nausea or vomiting       senna-docusate 8.6-50 MG  per tablet    SENOKOT-S;PERICOLACE    100 tablet    Take 2 tablets by mouth 2 times daily       sulfamethoxazole-trimethoprim 400-80 MG per tablet    BACTRIM/SEPTRA    30 tablet    Take 1 tablet by mouth daily       tacrolimus 1 MG capsule    PROGRAF - GENERIC EQUIVALENT    180 capsule    Take 4 capsules (4 mg) by mouth 2 times daily       valGANciclovir 450 MG tablet    VALCYTE    60 tablet    Take 1 tablet (450 mg) by mouth daily       * Notice:  This list has 11 medication(s) that are the same as other medications prescribed for you. Read the directions carefully, and ask your doctor or other care provider to review them with you.

## 2017-03-13 NOTE — PROGRESS NOTES
Transplant Surgery -OUTPATIENT IMMUNOSUPPRESSION PROGRESS NOTE    Date of Visit: 03/13/2017    Transplants:  3/4/2017 (Liver); Postoperative day:  9  ASSESMENT AND PLAN:  1.Graft Function: Liver allograft: no rejection or technical problems.    2.Immunosuppression Management: keep tacrolimus and cellcept  3.Hypertension: ok  4.Renal Function:  ok  5.Lab frequency: twice weekly  6.Other:  Has severe back pain: would recommend: X ray LS and MRI    Date: March 13, 2017    Transplant:  [x]                             Liver [x]                              Kidney []                             Pancreas []                              Other:             Chief Complaint:No chief complaint on file.  Has back pain  History of Present Illness:  Post liver transplant   Patient Active Problem List   Diagnosis     Diabetes mellitus, type 2 (H)     Carpal tunnel syndrome     Essential hypertension     Personal history of thrombophlebitis     Esophageal reflux     Depressive disorder, not elsewhere classified     Hyperlipidemia     Sleep apnea     Testicular hypofunction     TYPE 2 DIABETES, HBA1C GOAL < 7%     HYPERLIPIDEMIA LDL GOAL <100     Fibromyalgia     OA (osteoarthritis)     Cirrhosis (H)     Sicca syndrome (H)     Knee pain     Primary localized osteoarthrosis, lower leg     Cerebrovascular accident (H)     Diabetes mellitus with neurological manifestation (H)     Medication refill- do not delete      Pain medication agreement signed - Shay Krikpatrick 2/7/14     Hepatocellular carcinoma (H)     Hepatic cirrhosis, unspecified hepatic cirrhosis type (H)     Preoperative cardiovascular examination     HCC (hepatocellular carcinoma) (H)     Edema     Cellulitis of scrotum     Uncontrolled type 2 diabetes mellitus with hyperglycemia, with long-term current use of insulin (H)     Debility     Type 2 diabetes mellitus without complication, with long-term current use of insulin (H)     Altered mental status, unspecified     Hepatic  encephalopathy (H)     Urinary tract infection associated with catheterization of urinary tract (H)     Altered mental status     Hypoglycemia     Hepatic cirrhosis (H)     Osteoarthritis     Rheumatoid arthritis (H)     Hyperkalemia     Liver transplant recipient (H)     Acute kidney injury (H)     Immunosuppressed status (H)     SOCIAL /FAMILY HISTORY: [x]                  No recent change    Past Medical History   Diagnosis Date     Acute venous embolism and thrombosis of other specified veins 11/01     Deep vein thrombophlebitis, filter placed     Cancer (H)      Depressive disorder, not elsewhere classified      Esophageal reflux      Fibromyalgia 1/2009     dx with Dr Benitez( [facility])     Osteoarthritis      Other and unspecified hyperlipidemia      Other chronic nonalcoholic liver disease      Fatty liver      Other testicular hypofunction      Pneumonia, organism unspecified 10-01     Included ARDS, sepsis, and  acute renal failure; hospitalized     Rheumatoid arthritis(714.0)      Type II or unspecified type diabetes mellitus without mention of complication, not stated as uncontrolled 11/01     Managed by endocrinology     Unspecified essential hypertension 11/01     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     Unspecified sleep apnea      Uses BiPAP     Past Surgical History   Procedure Laterality Date     C nonspecific procedure       tracheostomy     C nonspecific procedure       repair of deviated septum     C nonspecific procedure  2007     Rt knee arthroscopy     C total knee arthroplasty  2008     Right knee arthroscopy     Cholecystectomy       Esophagoscopy, gastroscopy, duodenoscopy (egd), combined N/A 8/4/2016     Procedure: COMBINED ESOPHAGOSCOPY, GASTROSCOPY, DUODENOSCOPY (EGD), BIOPSY SINGLE OR MULTIPLE;  Surgeon: Trent Pederson MD;  Location:  [facility]     Social History     Social History     Marital status:      Spouse name: N/A     Number of children: 1     Years of education: N/A      Occupational History            Social History Main Topics     Smoking status: Former Smoker     Smokeless tobacco: Former User     Types: Chew     Quit date: 10/8/2015      Comment: Has used chewing tobacco since age 15 , chewed for 20yrs      Alcohol use No      Comment: last drink about a year ago (quit 2001)     Drug use: No     Sexual activity: Yes     Partners: Female     Other Topics Concern     Not on file     Social History Narrative    uSED TO BE      Back in school now         Prescription Medications as of 3/13/2017             oxyCODONE (ROXICODONE) 5 MG IR tablet Take 1-2 tablets (5-10 mg) by mouth every 4 hours as needed for moderate to severe pain    aspirin 325 MG tablet 1 tablet (325 mg) by Oral or Feeding Tube route daily    insulin aspart (NOVOLOG PEN) 100 UNIT/ML injection DOSE:  1 units per 8 grams of carbohydrate. With meals and snacks. Only chart total amount of units given.  Do not give if pre-prandial glucose is less than 60 mg/dL.    insulin glargine (LANTUS) 100 UNIT/ML injection Inject 45 Units Subcutaneous every 24 hours    valGANciclovir (VALCYTE) 450 MG tablet Take 1 tablet (450 mg) by mouth daily    mycophenolate (CELLCEPT - GENERIC EQUIVALENT) 250 MG capsule Take 4 capsules (1,000 mg) by mouth 2 times daily    senna-docusate (SENOKOT-S;PERICOLACE) 8.6-50 MG per tablet Take 2 tablets by mouth 2 times daily    sulfamethoxazole-trimethoprim (BACTRIM/SEPTRA) 400-80 MG per tablet Take 1 tablet by mouth daily    clotrimazole (MYCELEX) 10 MG LOZG lozenge Place 1 lozenge (1 Beatrice) inside cheek 3 times daily    multivitamin, therapeutic with minerals (THERA-VIT-M) TABS tablet Take 1 tablet by mouth daily    insulin aspart (NOVOLOG PEN) 100 UNIT/ML injection DOSE:  1 units per 10 grams of carbohydrate.  Only chart total amount of units given. HOLD CARB COVERAGE FOR  03/10/17 . ENDOCRINE TEAM WILL GIVE ALTERNATE ORDERS PAGER [phone]   Do not give if  pre-prandial glucose is less than 60 mg/dL.    insulin aspart (NOVOLOG PEN) 100 UNIT/ML injection Inject 1-10 Units Subcutaneous 3 times daily (before meals) For Pre-Meal  - 189 give 1 unit.   For Pre-Meal  - 239 give 2 units.   For Pre-Meal  - 289 give 3 units.   For Pre-Meal  - 339 give 4 units.   For Pre-Meal -399 give 5 units.  For Pre-Meal -449 give 6 units.  For Pre-Meal BG = or > 450 give 7 units.    insulin aspart (NOVOLOG PEN) 100 UNIT/ML injection Inject 1-7 Units Subcutaneous At Bedtime Bedtime Blood Glucose (BG)  For  - 249 give 1 units.   For  - 299 give 2 units.   For  - 349 give 3 units.  For - 399 give 4 units.   For BG = or > 400 give 5 units.    tacrolimus (PROGRAF - GENERIC EQUIVALENT) 1 MG capsule Take 4 capsules (4 mg) by mouth 2 times daily    gabapentin (NEURONTIN) 300 MG capsule Take 2 capsules (600 mg) by mouth At Bedtime    ondansetron (ZOFRAN-ODT) 4 MG ODT tab Take 1 tablet (4 mg) by mouth every 8 hours as needed for nausea    cyclobenzaprine (FLEXERIL) 10 MG tablet Take 1 tablet (10 mg) by mouth 3 times daily as needed for muscle spasms    omeprazole (PRILOSEC) 20 MG CR capsule Take 1 capsule (20 mg) by mouth 2 times daily (before meals)    glucagon 1 MG SOLR injection Inject 1 mg Subcutaneous every 15 minutes as needed for low blood sugar (May repeat x 1 only)    lactobacillus rhamnosus, GG, (CULTURELL) capsule Take 1 capsule by mouth 2 times daily      Facility Administered Medications as of 3/13/2017             furosemide (LASIX) injection 40 mg Inject 4 mLs (40 mg) into the vein once    albumin human 25 % injection 12.5 g Inject 50 mLs (12.5 g) into the vein 4 times daily as needed for other (12.5 grams per liter of ascitic fluid removed up to 50 grams)        Erythromycin and Vioxx   REVIEW OF SYSTEMS (check box if normal)  [x]               GENERAL  [x]                 PULMONARY [x]                GENITOURINARY  [x]                 CNS                 [x]                 CARDIAC  [x]                 ENDOCRINE  [x]                EARS,NOSE,THROAT [x]                 GASTROINTESTINAL [x]                 NEUROLOGIC    [x]                MUSCLOSKELTAL  [x]                  HEMATOLOGY      PHYSICAL EXAM (check box if normal)There were no vitals taken for this visit.        [x]            GENERAL:    [x]       EYES:  ICTERIC   []        YES  []                    NO  [x]           EXTREMITIES: Clubbing []       Y     [x]           N    [x]           EARS, NOSE, THROAT: Membranes Moist    YES   [x]                   NO []                  [x]           LUNGS:  CLEAR    YES       [x]                  NO    []                                [x]           SKIN: Jaundice           YES       []                  NO    [x]                   Rash: YES       []                  NO    [x]                                     [x]             HEART: Regular Rate          YES       [x]                  NO    []                   Incision Clean:  YES       [x]                  NO    []                                [x]                    ABDOMEN: Wound healthy, the back muscles are tender Organomegaly YES       []                  NO    [x]                       [x]                    NEUROLOGICAL:  Nonfocal  YES       [x]                  NO    []                       [x]                    Hernia YES       []                  NO    [x]                   PSYCHIATRIC:  Appropriate  YES       [x]                  NO    []                       OTHER:                                                                                                   PAIN SCALE:: 4

## 2017-03-13 NOTE — PLAN OF CARE
POST TRANSPLANT COORDINATOR VISIT BEFORE DISCHARGE    Important Dates:    Transplant Date: 3/04/2017    Anticipated date of discharge: ?unknown    Care Team:    Care Provider at Home: not discussed    Post Transplant Coordinator: Abril Doty      Transplant Surgeon:     Transplant : Maria Dolores Sparrow      Pt standing with c/o back pain, crying out with discomfort.  Coordinator visit not completed due to pt's discomfort.

## 2017-03-13 NOTE — MR AVS SNAPSHOT
After Visit Summary   3/13/2017    Camacho Bhagat    MRN: [id number]           Patient Information     Date Of Birth          1965        Visit Information        Provider Department      3/13/2017 9:00 AM Jovan Tran MD [facility] Specialty and Procedure        Today's Diagnoses     Immunosuppression (H)    -  1    Pain        History of liver transplant (H)           Follow-ups after your visit        Additional Services     PHYSICAL THERAPY REFERRAL       *This therapy referral will be filtered to a centralized scheduling office at [facility] and the patient will receive a call to schedule an appointment at a [facility] location most convenient for them. *     [facility] provides Physical Therapy evaluation and treatment and many specialty services across the [facility] system.  If requesting a specialty program, please choose from the list below.    If you have not heard from the scheduling office within 2 business days, please call [phone] for all locations, with the exception of [place], please call [phone].  Treatment: Evaluation & Treatment  Special Instructions/Modalities: assess and treat post liver transplant  Special Programs: Pt post transplant, also has low back discomfort, post tx    Please be aware that coverage of these services is subject to the terms and limitations of your health insurance plan.  Call member services at your health plan with any benefit or coverage questions.      **Note to Provider:  If you are referring outside of [facility] for the therapy appointment, please list the name of the location in the  special instructions  above, print the referral and give to the patient to schedule the appointment.                  Your next 10 appointments already scheduled     Mar 28, 2017 11:00 AM CDT   Evaluation with Leela Cuadra, PT   [facility] ([facility])    [address] [facility] [address]   [phone]            Apr 03, 2017 11:00 AM CDT   LAB with  LAB   [facility] ([facility])    [address] [place] [id number] [phone]           Patient must bring picture ID.  Patient should be prepared to give a urine specimen  Please do not eat 10-12 hours before your appointment if you are coming in fasting for labs on lipids, cholesterol, or glucose (sugar).  Pregnant women should follow their Care Team instructions. Water with medications is okay. Do not drink coffee or other fluids.   If you have concerns about taking  your medications, please ask at office or if scheduling via [employer], send a message by clicking on Secure Messaging, Message Your Care Team.            Apr 03, 2017 11:50 AM CDT   (Arrive by 11:35 AM)   Liver Return Post Op with Jovan Tran MD   [facility] Solid Organ Transplant ([facility])    [address] [place] [id number] [phone]            Apr 24, 2017  1:00 PM CDT   (Arrive by 12:45 PM)   Return General Liver with Toñito Camejo MD   [facility] Hepatology ([facility])    [address] [place] [id number] [phone]              Future tests that were ordered for you today     Open Standing Orders        Priority Remaining Interval Expires Ordered    Tacrolimus level Routine 1/1 AM DRAW  3/24/2017    Glucose monitor nursing POCT Routine 87794/72770 PRN  3/23/2017    Notify Provider Routine [id number]/03030 PRN  3/23/2017    Glucose monitor nursing POCT Routine 100/100 CONDITIONAL (SPECIFY)  3/23/2017    Glucose monitor nursing POCT Routine 100/100 CONDITIONAL (SPECIFY)  3/23/2017    Notify Provider Routine [id number]/[id number] PRN  3/23/2017    Glucose monitor nursing POCT- IF PO (eating meals or on bolus enteral feedings), within 30 minutes prior to each meal and at  bedtime. Routine 116/121 4 TIMES DAILY BEFORE MEALS & AT BEDTIME  3/23/2017    Hepatic panel Routine 15/16 DAILY  3/23/2017    Tacrolimus level Routine 6/7 EVERY MWF  3/23/2017    CBC with platelets differential Routine 15/16 DAILY  3/23/2017    Basic metabolic panel Routine 15/16 DAILY  3/23/2017    Magnesium Routine 15/16 DAILY  3/23/2017    Phosphorus Routine 15/16 DAILY  3/23/2017            Who to contact     If you have questions or need follow up information about today's clinic visit or your schedule please contact [facility] directly at [phone].  Normal or non-critical lab and imaging results will be communicated to you by [employer], letter or phone within 4 business days after the clinic has received the results. If you do not hear from us within 7 days, please contact the clinic through [employer] or phone. If you have a critical or abnormal lab result, we will notify you by phone as soon as possible.  Submit refill requests through [employer] or call your pharmacy and they will forward the refill request to us. Please allow 3 business days for your refill to be completed.          Additional Information About Your Visit        [employer] Information     [employer] gives you secure access to your electronic health record. If you see a primary care provider, you can also send messages to your care team and make appointments. If you have questions, please call your primary care clinic.  If you do not have a primary care provider, please call [phone] and they will assist you.        Care EveryWhere ID     This is your Care EveryWhere ID. This could be used by other organizations to access your [facility] medical records  [id number]         Blood Pressure from Last 3 Encounters:   No data found for BP    Weight from Last 3 Encounters:   No data found for Wt              Today, you had the following     No orders found for display         Today's Medication Changes           These changes are accurate as of: 3/13/17 11:59 PM.  If you have any questions, ask your nurse or doctor.               Start taking these medicines.        Dose/Directions    furosemide 20 MG tablet   Commonly known as:  LASIX   Used for:  Edema, unspecified type        Dose:  40 mg   Take 2 tablets (40 mg) by mouth 2 times daily   Quantity:  30 tablet   Refills:  0       LACTOBACILLUS EXTRA STRENGTH Caps   Used for:  Aftercare following organ transplant, Status post liver transplantation (H)        Dose:  1 capsule   Take 1 capsule by mouth 2 times daily   Quantity:  30 capsule   Refills:  1       lidocaine 5 % Patch   Commonly known as:  LIDODERM   Used for:  Low back pain at multiple sites        Dose:  1 patch   Place 1 patch onto the skin every 24 hours   Quantity:  30 patch   Refills:  1       prochlorperazine 5 MG tablet   Commonly known as:  COMPAZINE   Used for:  Aftercare following organ transplant, Status post liver transplantation (H)        Dose:  5-10 mg   Take 1-2 tablets (5-10 mg) by mouth every 6 hours as needed for nausea or vomiting   Quantity:  90 tablet   Refills:  0         These medicines have changed or have updated prescriptions.        Dose/Directions    * cyclobenzaprine 10 MG tablet   Commonly known as:  FLEXERIL   This may have changed:  Another medication with the same name was added. Make sure you understand how and when to take each.   Used for:  Bladder spasms        Dose:  10 mg   Take 1 tablet (10 mg) by mouth 3 times daily as needed for muscle spasms   Quantity:  30 tablet   Refills:  0       * cyclobenzaprine 10 MG tablet   Commonly known as:  FLEXERIL   This may have changed:  You were already taking a medication with the same name, and this prescription was added. Make sure you understand how and when to take each.   Used for:  Immunosuppression (H)   Changed by:  Jovan Tran MD        Dose:  10 mg   Take 1 tablet (10 mg) by mouth 3 times daily as needed for  muscle spasms   Quantity:  90 tablet   Refills:  1       * cyclobenzaprine 5 MG tablet   Commonly known as:  FLEXERIL   This may have changed:  You were already taking a medication with the same name, and this prescription was added. Make sure you understand how and when to take each.   Used for:  Low back pain at multiple sites        Dose:  10 mg   Take 2 tablets (10 mg) by mouth 3 times daily as needed for muscle spasms   Quantity:  42 tablet   Refills:  0       * gabapentin 300 MG capsule   Commonly known as:  NEURONTIN   This may have changed:  Another medication with the same name was added. Make sure you understand how and when to take each.   Used for:  Type 2 diabetes mellitus with diabetic polyneuropathy, unspecified long term insulin use status (H)        Dose:  600 mg   Take 2 capsules (600 mg) by mouth At Bedtime   Quantity:  60 capsule   Refills:  0       * gabapentin 300 MG capsule   Commonly known as:  NEURONTIN   This may have changed:  You were already taking a medication with the same name, and this prescription was added. Make sure you understand how and when to take each.   Used for:  Low back pain at multiple sites        Dose:  300 mg   Take 1 capsule (300 mg) by mouth 3 times daily   Quantity:  90 capsule   Refills:  1       * oxyCODONE 5 MG IR tablet   Commonly known as:  ROXICODONE   This may have changed:  Another medication with the same name was added. Make sure you understand how and when to take each.   Used for:  Liver transplant recipient (H)        Dose:  5-10 mg   Take 1-2 tablets (5-10 mg) by mouth every 4 hours as needed for moderate to severe pain   Quantity:  40 tablet   Refills:  0       * oxyCODONE 10 MG IR tablet   Commonly known as:  ROXICODONE   This may have changed:  You were already taking a medication with the same name, and this prescription was added. Make sure you understand how and when to take each.   Used for:  Immunosuppression (H)   Changed by:  Chelsea  MD Jovan        Dose:  10 mg   Take 1 tablet (10 mg) by mouth every 4 hours as needed for moderate to severe pain   Quantity:  30 tablet   Refills:  0       * Notice:  This list has 7 medication(s) that are the same as other medications prescribed for you. Read the directions carefully, and ask your doctor or other care provider to review them with you.         Where to get your medicines      These medications were sent to [facility] - [place], [place] - [address]  [address] [address]     Phone:  [phone]     cyclobenzaprine 10 MG tablet         These medications were sent to [facility] - [place], [place] - [address]  [address], [facility] [address]    Hours:  TRANSPLANT PHONE NUMBER [phone] Phone:  [phone]     cyclobenzaprine 5 MG tablet    furosemide 20 MG tablet    gabapentin 300 MG capsule    LACTOBACILLUS EXTRA STRENGTH Caps    lidocaine 5 % Patch    prochlorperazine 5 MG tablet         Some of these will need a paper prescription and others can be bought over the counter.  Ask your nurse if you have questions.     Bring a paper prescription for each of these medications     oxyCODONE 10 MG IR tablet                Primary Care Provider Office Phone # Fax #    Shay Kirkpatrick [phone]        [facility] [address] [place] [address] [address]  [facility] [address]        Thank you!     Thank you for choosing [facility] SPECIAL[facility] PROCEDURE  for your care. Our goal is always to provide you with excellent care. Hearing back from our patients is one way we can continue to improve our services. Please take a few minutes to complete the written survey that you may receive in the mail after your visit with us. Thank you!             Your Updated Medication List - Protect others around you: Learn how to safely use, store and throw away your medicines at  www.disposemymeds.org.          This list is accurate as of: 3/13/17 11:59 PM.  Always use your most recent med list.                   Brand Name Dispense Instructions for use    aspirin 325 MG tablet     180 tablet    1 tablet (325 mg) by Oral or Feeding Tube route daily       clotrimazole 10 MG Lozg lozenge    MYCELEX    70 each    Place 1 lozenge (1 Beatrice) inside cheek 3 times daily       * cyclobenzaprine 10 MG tablet    FLEXERIL    30 tablet    Take 1 tablet (10 mg) by mouth 3 times daily as needed for muscle spasms       * cyclobenzaprine 10 MG tablet    FLEXERIL    90 tablet    Take 1 tablet (10 mg) by mouth 3 times daily as needed for muscle spasms       * cyclobenzaprine 5 MG tablet    FLEXERIL    42 tablet    Take 2 tablets (10 mg) by mouth 3 times daily as needed for muscle spasms       furosemide 20 MG tablet    LASIX    30 tablet    Take 2 tablets (40 mg) by mouth 2 times daily       * gabapentin 300 MG capsule    NEURONTIN    60 capsule    Take 2 capsules (600 mg) by mouth At Bedtime       * gabapentin 300 MG capsule    NEURONTIN    90 capsule    Take 1 capsule (300 mg) by mouth 3 times daily       glucagon 1 MG Solr injection     1 each    Inject 1 mg Subcutaneous every 15 minutes as needed for low blood sugar (May repeat x 1 only)       * insulin aspart 100 UNIT/ML injection    NovoLOG PEN    3 mL    DOSE:  1 units per 8 grams of carbohydrate. With meals and snacks. Only chart total amount of units given.  Do not give if pre-prandial glucose is less than 60 mg/dL.       * insulin aspart 100 UNIT/ML injection    NovoLOG PEN    3 mL    DOSE:  1 units per 10 grams of carbohydrate.  Only chart total amount of units given. HOLD CARB COVERAGE FOR  03/10/17 . ENDOCRINE TEAM WILL GIVE ALTERNATE ORDERS PAGER [phone]  Do not give if pre-prandial glucose is less than 60 mg/dL.       * insulin aspart 100 UNIT/ML injection    NovoLOG PEN    3 mL    Inject 1-10 Units Subcutaneous 3 times daily (before meals) For  Pre-Meal  - 189 give 1 unit.  For Pre-Meal  - 239 give 2 units.  For Pre-Meal  - 289 give 3 units.  For Pre-Meal  - 339 give 4 units.  For Pre-Meal -399 give 5 units. For Pre-Meal -449 give 6 units. For Pre-Meal BG = or > 450 give 7 units.       * insulin aspart 100 UNIT/ML injection    NovoLOG PEN    3 mL    Inject 1-7 Units Subcutaneous At Bedtime Bedtime Blood Glucose (BG) For  - 249 give 1 units.  For  - 299 give 2 units.  For  - 349 give 3 units. For - 399 give 4 units.  For BG = or > 400 give 5 units.       insulin glargine 100 UNIT/ML injection    LANTUS    3 mL    Inject 45 Units Subcutaneous every 24 hours       LACTOBACILLUS EXTRA STRENGTH Caps     30 capsule    Take 1 capsule by mouth 2 times daily       lactobacillus rhamnosus (GG) capsule     60 capsule    Take 1 capsule by mouth 2 times daily       lidocaine 5 % Patch    LIDODERM    30 patch    Place 1 patch onto the skin every 24 hours       multivitamin, therapeutic with minerals Tabs tablet     30 each    Take 1 tablet by mouth daily       mycophenolate 250 MG capsule    CELLCEPT - GENERIC EQUIVALENT    240 capsule    Take 4 capsules (1,000 mg) by mouth 2 times daily       omeprazole 20 MG CR capsule    priLOSEC    60 capsule    Take 1 capsule (20 mg) by mouth 2 times daily (before meals)       ondansetron 4 MG ODT tab    ZOFRAN-ODT    30 tablet    Take 1 tablet (4 mg) by mouth every 8 hours as needed for nausea       * oxyCODONE 5 MG IR tablet    ROXICODONE    40 tablet    Take 1-2 tablets (5-10 mg) by mouth every 4 hours as needed for moderate to severe pain       * oxyCODONE 10 MG IR tablet    ROXICODONE    30 tablet    Take 1 tablet (10 mg) by mouth every 4 hours as needed for moderate to severe pain       prochlorperazine 5 MG tablet    COMPAZINE    90 tablet    Take 1-2 tablets (5-10 mg) by mouth every 6 hours as needed for nausea or vomiting       senna-docusate 8.6-50 MG per tablet     SENOKOT-S;PERICOLACE    100 tablet    Take 2 tablets by mouth 2 times daily       sulfamethoxazole-trimethoprim 400-80 MG per tablet    BACTRIM/SEPTRA    30 tablet    Take 1 tablet by mouth daily       valGANciclovir 450 MG tablet    VALCYTE    60 tablet    Take 1 tablet (450 mg) by mouth daily       * Notice:  This list has 11 medication(s) that are the same as other medications prescribed for you. Read the directions carefully, and ask your doctor or other care provider to review them with you.

## 2017-03-14 ENCOUNTER — TELEPHONE (OUTPATIENT)
Dept: TRANSPLANT | Facility: CLINIC | Age: 52
End: 2017-03-14

## 2017-03-14 LAB
BACTERIA SPEC CULT: ABNORMAL
COPATH REPORT: NORMAL
MICRO REPORT STATUS: ABNORMAL
MICROORGANISM SPEC CULT: ABNORMAL
SPECIMEN SOURCE: ABNORMAL

## 2017-03-14 NOTE — NURSING NOTE
Here today for post liver transplant follow-up.  Accompanied by mom.     Has lab book, knows how to obtain and record labs.  Reviewed lab results and assisted with interpretation / understanding.    Appetite poor.  Encouraged small, frequent meals.    Activity:  Increasing activity.  Reminded of lifting restriction, encouraged to increase activity as tolerated.    Pain management:  Extreme back pain, plan for xrays today,    Biliary stent: placed    Lab frequency:  Twice weekly    Med changes:     All changes documented on med card.    Homecare:  Will not have homecare, did not want.  Will do outpt labs at [facility], will go to outpt PT at [facility].referral for PT generated.    Recommended follow-up:  1.)  PCP to make an appt.to see him.  2.)  Surgeon:   1 week  3.)  Hepatology 4/24/2017    Endocrinology  Pt to see his endocrinologist, requesting copy of discharge summary be sent out to him     Given transplant handbook. Reviewed need for pt to read handbook .  I have addressed all questions.  Patient has my contact information and knows how to contact afterhours for assistance / questions.

## 2017-03-15 ENCOUNTER — OFFICE VISIT (OUTPATIENT)
Dept: TRANSPLANT | Facility: CLINIC | Age: 52
End: 2017-03-15
Attending: TRANSPLANT SURGERY
Payer: COMMERCIAL

## 2017-03-15 ENCOUNTER — TELEPHONE (OUTPATIENT)
Dept: TRANSPLANT | Facility: CLINIC | Age: 52
End: 2017-03-15

## 2017-03-15 VITALS
TEMPERATURE: 98.1 F | OXYGEN SATURATION: 99 % | DIASTOLIC BLOOD PRESSURE: 75 MMHG | HEART RATE: 62 BPM | SYSTOLIC BLOOD PRESSURE: 118 MMHG | RESPIRATION RATE: 16 BRPM

## 2017-03-15 DIAGNOSIS — Z94.4 LIVER REPLACED BY TRANSPLANT (H): Primary | ICD-10-CM

## 2017-03-15 PROCEDURE — 99211 OFF/OP EST MAY X REQ PHY/QHP: CPT

## 2017-03-15 PROCEDURE — 25000125 ZZHC RX 250: Mod: ZF

## 2017-03-15 RX ORDER — FUROSEMIDE 20 MG
80 TABLET ORAL DAILY
Qty: 30 TABLET | Refills: 3 | Status: ON HOLD | OUTPATIENT
Start: 2017-03-15 | End: 2017-03-26

## 2017-03-15 NOTE — MR AVS SNAPSHOT
After Visit Summary   3/15/2017    Camacho Bhagat    MRN: [id number]           Patient Information     Date Of Birth          1965        Visit Information        Provider Department      3/15/2017 12:00 PM Jovan Tran MD [facility]        Today's Diagnoses     Liver replaced by transplant (H)    -  1       Follow-ups after your visit        Your next 10 appointments already scheduled     Mar 16, 2017  9:00 AM CDT   (Arrive by 8:45 AM)   Return with Jovan Tran MD   [facility] ([facility])    [address]  2nd [address]  [facility] [address]   [phone]            Mar 16, 2017  9:00 AM CDT   (Arrive by 8:45 AM)   New Transplant Visit with UC SPEC INFUSION, UC 41 ATC   [facility] ([facility])    [address]  [address]  [facility] [address]   [phone]            Mar 18, 2017  4:00 PM CDT   (Arrive by 3:45 PM)   MR LUMBAR SPINE W/O & W CONTRAST with [id number]   [facility] ([facility])    [address]  1st Floor  [facility] [address]   [phone]           Take your medicines as usual, unless your doctor tells you not to. Bring a list of your current medicines to your exam (including vitamins, minerals and over-the-counter drugs).  You will be given intravenous contrast for this exam. To prepare:   The day before your exam, drink extra fluids at least six 8-ounce glasses (unless your doctor tells you to restrict your fluids).   Have a blood test (creatinine test) within 30 days of your exam. Go to your clinic or Diagnostic Imaging Department for this test.  The MRI machine uses a strong magnet. Please wear clothes without metal (snaps, zippers). A sweatsuit works well, or we may give you a hospital gown.  Please remove any body piercings  and hair extensions before you arrive. You will also remove watches, jewelry, hairpins, wallets, dentures, partial dental plates and hearing aids. You may wear contact lenses, and you may be able to wear your rings. We have a safe place to keep your personal items, but it is safer to leave them at home.   **IMPORTANT** THE INSTRUCTIONS BELOW ARE ONLY FOR THOSE PATIENTS WHO HAVE BEEN TOLD THEY WILL RECEIVE SEDATION OR GENERAL ANESTHESIA DURING THEIR MRI PROCEDURE:  IF YOU WILL RECEIVE SEDATION (take medicine to help you relax during your exam):   You must get the medicine from your doctor before you arrive. Bring the medicine to the exam. Do not take it at home.   Arrive one hour early. Bring someone who can take you home after the test. Your medicine will make you sleepy. After the exam, you may not drive, take a bus or take a taxi by yourself.   No eating 8 hours before your exam. You may have clear liquids up until 4 hours before your exam. (Clear liquids include water, clear tea, black coffee and fruit juice without pulp.)  IF YOU WILL RECEIVE ANESTHESIA (be asleep for your exam):   Arrive 1 1/2 hours early. Bring someone who can take you home after the test. You may not drive, take a bus or take a taxi by yourself.   No eating 8 hours before your exam. You may have clear liquids up until 4 hours before your exam. (Clear liquids include water, clear tea, black coffee and fruit juice without pulp.)  Please call the Imaging Department at your exam site with any questions.            Mar 20, 2017 11:10 AM CDT   (Arrive by 10:55 AM)   Liver Return Post Op with Jovan Tran MD   [facility] ([facility])    [address] [place] [id number] [phone]            Mar 20, 2017 11:30 AM CDT   (Arrive by 11:15 AM)   Liver Return Post Op with Jovan Tran MD   [facility]plant ([facility])    [facility]  [facility]  3rd [facility] [id number] [phone]            Apr 24, 2017  1:00 PM CDT   (Arrive by 12:45 PM)   Return General Liver with Toñito Camejo MD   [facility] ([facility])    [address]  3rd [facility] [id number] [phone]              Who to contact     If you have questions or need follow up information about today's clinic visit or your schedule please contact [facility]ANSPL[facility] directly at [phone].  Normal or non-critical lab and imaging results will be communicated to you by Provadehart, letter or phone within 4 business days after the clinic has received the results. If you do not hear from us within 7 days, please contact the clinic through [employer] or phone. If you have a critical or abnormal lab result, we will notify you by phone as soon as possible.  Submit refill requests through [employer] or call your pharmacy and they will forward the refill request to us. Please allow 3 business days for your refill to be completed.          Additional Information About Your Visit        MyChart Information     [employer] gives you secure access to your electronic health record. If you see a primary care provider, you can also send messages to your care team and make appointments. If you have questions, please call your primary care clinic.  If you do not have a primary care provider, please call [phone] and they will assist you.        Care EveryWhere ID     This is your Care EveryWhere ID. This could be used by other organizations to access your [place] medical records  [id number]        Your Vitals Were     Pulse Temperature Respirations Pulse Oximetry          62 98.1  F (36.7  C) (Oral) 16 99%         Blood Pressure from Last 3 Encounters:   03/15/17 118/75   03/13/17 137/75   03/12/17 135/80    Weight from Last 3 Encounters:   03/13/17 105.1 kg (231 lb 9.6 oz)   03/10/17 98.4 kg (216 lb 14.4 oz)   02/21/17  107.4 kg (236 lb 12.8 oz)              Today, you had the following     No orders found for display         Today's Medication Changes          These changes are accurate as of: 3/15/17  1:01 PM.  If you have any questions, ask your nurse or doctor.               These medicines have changed or have updated prescriptions.        Dose/Directions    * furosemide 20 MG tablet   Commonly known as:  LASIX   This may have changed:  Another medication with the same name was added. Make sure you understand how and when to take each.   Used for:  Edema, unspecified type        Dose:  40 mg   Take 2 tablets (40 mg) by mouth 2 times daily   Quantity:  30 tablet   Refills:  0       * furosemide 20 MG tablet   Commonly known as:  LASIX   This may have changed:  You were already taking a medication with the same name, and this prescription was added. Make sure you understand how and when to take each.   Used for:  Liver replaced by transplant (H)   Changed by:  Jovan Tran MD        Dose:  80 mg   Take 4 tablets (80 mg) by mouth daily   Quantity:  30 tablet   Refills:  3       * Notice:  This list has 2 medication(s) that are the same as other medications prescribed for you. Read the directions carefully, and ask your doctor or other care provider to review them with you.         Where to get your medicines      These medications were sent to [facility] [place], [place] - [address]  [address] [facility] [address]     Phone:  [phone]     furosemide 20 MG tablet                Primary Care Provider Office Phone # Fax #    Shay Kirkpatrick [phone]        [facility] [address] [place] [address] [phone]  [facility] [id number]        Thank you!     Thank you for choosing [facility]  for your care. Our goal is always to provide you with excellent care. Hearing back from our patients is one way we can continue to improve our services. Please take a few  minutes to complete the written survey that you may receive in the mail after your visit with us. Thank you!             Your Updated Medication List - Protect others around you: Learn how to safely use, store and throw away your medicines at www.disposemymeds.org.          This list is accurate as of: 3/15/17  1:01 PM.  Always use your most recent med list.                   Brand Name Dispense Instructions for use    aspirin 325 MG tablet     180 tablet    1 tablet (325 mg) by Oral or Feeding Tube route daily       clotrimazole 10 MG Lozg lozenge    MYCELEX    70 each    Place 1 lozenge (1 Beatrice) inside cheek 3 times daily       * cyclobenzaprine 10 MG tablet    FLEXERIL    30 tablet    Take 1 tablet (10 mg) by mouth 3 times daily as needed for muscle spasms       * cyclobenzaprine 10 MG tablet    FLEXERIL    90 tablet    Take 1 tablet (10 mg) by mouth 3 times daily as needed for muscle spasms       * cyclobenzaprine 5 MG tablet    FLEXERIL    42 tablet    Take 2 tablets (10 mg) by mouth 3 times daily as needed for muscle spasms       * furosemide 20 MG tablet    LASIX    30 tablet    Take 2 tablets (40 mg) by mouth 2 times daily       * furosemide 20 MG tablet    LASIX    30 tablet    Take 4 tablets (80 mg) by mouth daily       * gabapentin 300 MG capsule    NEURONTIN    60 capsule    Take 2 capsules (600 mg) by mouth At Bedtime       * gabapentin 300 MG capsule    NEURONTIN    90 capsule    Take 1 capsule (300 mg) by mouth 3 times daily       glucagon 1 MG Solr injection     1 each    Inject 1 mg Subcutaneous every 15 minutes as needed for low blood sugar (May repeat x 1 only)       * insulin aspart 100 UNIT/ML injection    NovoLOG PEN    3 mL    DOSE:  1 units per 8 grams of carbohydrate. With meals and snacks. Only chart total amount of units given.  Do not give if pre-prandial glucose is less than 60 mg/dL.       * insulin aspart 100 UNIT/ML injection    NovoLOG PEN    3 mL    DOSE:  1 units per 10 grams of  carbohydrate.  Only chart total amount of units given. HOLD CARB COVERAGE FOR  03/10/17 . ENDOCRINE TEAM WILL GIVE ALTERNATE ORDERS PAGER [phone]  Do not give if pre-prandial glucose is less than 60 mg/dL.       * insulin aspart 100 UNIT/ML injection    NovoLOG PEN    3 mL    Inject 1-10 Units Subcutaneous 3 times daily (before meals) For Pre-Meal  - 189 give 1 unit.  For Pre-Meal  - 239 give 2 units.  For Pre-Meal  - 289 give 3 units.  For Pre-Meal  - 339 give 4 units.  For Pre-Meal -399 give 5 units. For Pre-Meal -449 give 6 units. For Pre-Meal BG = or > 450 give 7 units.       * insulin aspart 100 UNIT/ML injection    NovoLOG PEN    3 mL    Inject 1-7 Units Subcutaneous At Bedtime Bedtime Blood Glucose (BG) For  - 249 give 1 units.  For  - 299 give 2 units.  For  - 349 give 3 units. For - 399 give 4 units.  For BG = or > 400 give 5 units.       insulin glargine 100 UNIT/ML injection    LANTUS    3 mL    Inject 45 Units Subcutaneous every 24 hours       LACTOBACILLUS EXTRA STRENGTH Caps     30 capsule    Take 1 capsule by mouth 2 times daily       lactobacillus rhamnosus (GG) capsule     60 capsule    Take 1 capsule by mouth 2 times daily       lidocaine 5 % Patch    LIDODERM    30 patch    Place 1 patch onto the skin every 24 hours       multivitamin, therapeutic with minerals Tabs tablet     30 each    Take 1 tablet by mouth daily       mycophenolate 250 MG capsule    CELLCEPT - GENERIC EQUIVALENT    240 capsule    Take 4 capsules (1,000 mg) by mouth 2 times daily       omeprazole 20 MG CR capsule    priLOSEC    60 capsule    Take 1 capsule (20 mg) by mouth 2 times daily (before meals)       ondansetron 4 MG ODT tab    ZOFRAN-ODT    30 tablet    Take 1 tablet (4 mg) by mouth every 8 hours as needed for nausea       * oxyCODONE 5 MG IR tablet    ROXICODONE    40 tablet    Take 1-2 tablets (5-10 mg) by mouth every 4 hours as needed for moderate to severe  pain       * oxyCODONE 10 MG IR tablet    ROXICODONE    30 tablet    Take 1 tablet (10 mg) by mouth every 4 hours as needed for moderate to severe pain       prochlorperazine 5 MG tablet    COMPAZINE    90 tablet    Take 1-2 tablets (5-10 mg) by mouth every 6 hours as needed for nausea or vomiting       senna-docusate 8.6-50 MG per tablet    SENOKOT-S;PERICOLACE    100 tablet    Take 2 tablets by mouth 2 times daily       sulfamethoxazole-trimethoprim 400-80 MG per tablet    BACTRIM/SEPTRA    30 tablet    Take 1 tablet by mouth daily       tacrolimus 1 MG capsule    PROGRAF - GENERIC EQUIVALENT    180 capsule    Take 4 capsules (4 mg) by mouth 2 times daily       valGANciclovir 450 MG tablet    VALCYTE    60 tablet    Take 1 tablet (450 mg) by mouth daily       * Notice:  This list has 13 medication(s) that are the same as other medications prescribed for you. Read the directions carefully, and ask your doctor or other care provider to review them with you.

## 2017-03-15 NOTE — TELEPHONE ENCOUNTER
Per request  pt to be seen in clinic today to assess, after biliary drain fell out at home.   Email, voice mail messages left for pt and his wife.   Received a phone call from [facility], unable to come this morning due to distance, and needing transportation from family, his mother.  Plan for pt to be seen at 12 noon in clinic,  ATC unable to accomodate pt today.

## 2017-03-15 NOTE — PROGRESS NOTES
HPI      ROS      Physical Exam    C/o     TABITHA drain accidentally pulled out, draining all night long    /75 (BP Location: Right arm, Patient Position: Chair, Cuff Size: Adult Large)  Pulse 62  Temp 98.1  F (36.7  C) (Oral)  Resp 16  SpO2 99%   focussed exam:  Drain site leaking ascites.    Stitch placed under local    Plan: start lasix 80 mg po qd and  Clinic Monday

## 2017-03-15 NOTE — NURSING NOTE
Chief Complaint   Patient presents with     Liver Transplant     POD 11   PT is here because TABITHA drain fell out.     Initial /75 (BP Location: Right arm, Patient Position: Chair, Cuff Size: Adult Large)  Pulse 62  Temp 98.1  F (36.7  C) (Oral)  Resp 16  SpO2 99% Estimated body mass index is 31.41 kg/(m^2) as calculated from the following:    Height as of 3/4/17: 1.829 m (6').    Weight as of 3/13/17: 105.1 kg (231 lb 9.6 oz).  Medication Reconciliation: complete

## 2017-03-15 NOTE — LETTER
3/15/2017       RE: Camacho Bhagat  [address] [place] [facility] [address]     Dear Colleague,    Thank you for referring your patient, Camacho Bhagat, to the [facility] at [facility]. Please see a copy of my visit note below.    HPI      ROS      Physical Exam    C/o     TABITHA drain accidentally pulled out, draining all night long    /75 (BP Location: Right arm, Patient Position: Chair, Cuff Size: Adult Large)  Pulse 62  Temp 98.1  F (36.7  C) (Oral)  Resp 16  SpO2 99%   focussed exam:  Drain site leaking ascites.    Stitch placed under local    Plan: start lasix 80 mg po qd and  Clinic Monday        Again, thank you for allowing me to participate in the care of your patient.      Sincerely,    Jovan Tran MD

## 2017-03-16 ENCOUNTER — APPOINTMENT (OUTPATIENT)
Dept: INFUSION THERAPY | Facility: CLINIC | Age: 52
End: 2017-03-16
Attending: TRANSPLANT SURGERY
Payer: COMMERCIAL

## 2017-03-16 ENCOUNTER — TELEPHONE (OUTPATIENT)
Dept: TRANSPLANT | Facility: CLINIC | Age: 52
End: 2017-03-16

## 2017-03-16 VITALS
SYSTOLIC BLOOD PRESSURE: 109 MMHG | DIASTOLIC BLOOD PRESSURE: 57 MMHG | RESPIRATION RATE: 16 BRPM | BODY MASS INDEX: 30.9 KG/M2 | TEMPERATURE: 99.2 F | WEIGHT: 227.8 LBS | OXYGEN SATURATION: 89 % | HEART RATE: 89 BPM

## 2017-03-16 DIAGNOSIS — Z94.4 LIVER TRANSPLANT RECIPIENT (H): ICD-10-CM

## 2017-03-16 DIAGNOSIS — R60.9 EDEMA, UNSPECIFIED TYPE: ICD-10-CM

## 2017-03-16 DIAGNOSIS — Z94.4 LIVER REPLACED BY TRANSPLANT (H): ICD-10-CM

## 2017-03-16 LAB
ALBUMIN SERPL-MCNC: 2.1 G/DL (ref 3.4–5)
ALBUMIN UR-MCNC: NEGATIVE MG/DL
ALP SERPL-CCNC: 214 U/L (ref 40–150)
ALT SERPL W P-5'-P-CCNC: 34 U/L (ref 0–70)
ANION GAP SERPL CALCULATED.3IONS-SCNC: 8 MMOL/L (ref 3–14)
APPEARANCE UR: ABNORMAL
AST SERPL W P-5'-P-CCNC: 23 U/L (ref 0–45)
BILIRUB DIRECT SERPL-MCNC: 2.4 MG/DL (ref 0–0.2)
BILIRUB SERPL-MCNC: 3 MG/DL (ref 0.2–1.3)
BILIRUB UR QL STRIP: NEGATIVE
BUN SERPL-MCNC: 44 MG/DL (ref 7–30)
CALCIUM SERPL-MCNC: 8.1 MG/DL (ref 8.5–10.1)
CHLORIDE SERPL-SCNC: 97 MMOL/L (ref 94–109)
CO2 SERPL-SCNC: 25 MMOL/L (ref 20–32)
COLOR UR AUTO: ABNORMAL
CREAT SERPL-MCNC: 1.59 MG/DL (ref 0.66–1.25)
ERYTHROCYTE [DISTWIDTH] IN BLOOD BY AUTOMATED COUNT: 17.7 % (ref 10–15)
GFR SERPL CREATININE-BSD FRML MDRD: 46 ML/MIN/1.7M2
GLUCOSE SERPL-MCNC: 220 MG/DL (ref 70–99)
GLUCOSE UR STRIP-MCNC: NEGATIVE MG/DL
HCT VFR BLD AUTO: 20.8 % (ref 40–53)
HGB BLD-MCNC: 7.3 G/DL (ref 13.3–17.7)
HGB UR QL STRIP: NEGATIVE
HYALINE CASTS #/AREA URNS LPF: 28 /LPF (ref 0–2)
KETONES UR STRIP-MCNC: NEGATIVE MG/DL
LEUKOCYTE ESTERASE UR QL STRIP: NEGATIVE
MAGNESIUM SERPL-MCNC: 1.4 MG/DL (ref 1.6–2.3)
MCH RBC QN AUTO: 33 PG (ref 26.5–33)
MCHC RBC AUTO-ENTMCNC: 35.1 G/DL (ref 31.5–36.5)
MCV RBC AUTO: 94 FL (ref 78–100)
NITRATE UR QL: NEGATIVE
PH UR STRIP: 5 PH (ref 5–7)
PHOSPHATE SERPL-MCNC: 3.2 MG/DL (ref 2.5–4.5)
PLATELET # BLD AUTO: 105 10E9/L (ref 150–450)
POTASSIUM SERPL-SCNC: 5.9 MMOL/L (ref 3.4–5.3)
PROT SERPL-MCNC: 5.3 G/DL (ref 6.8–8.8)
RBC # BLD AUTO: 2.21 10E12/L (ref 4.4–5.9)
RBC #/AREA URNS AUTO: 0 /HPF (ref 0–2)
SODIUM SERPL-SCNC: 130 MMOL/L (ref 133–144)
SP GR UR STRIP: 1.01 (ref 1–1.03)
SQUAMOUS #/AREA URNS AUTO: 1 /HPF (ref 0–1)
URN SPEC COLLECT METH UR: ABNORMAL
UROBILINOGEN UR STRIP-MCNC: 2 MG/DL (ref 0–2)
WBC # BLD AUTO: 15 10E9/L (ref 4–11)
WBC #/AREA URNS AUTO: 5 /HPF (ref 0–2)

## 2017-03-16 PROCEDURE — 84100 ASSAY OF PHOSPHORUS: CPT | Performed by: TRANSPLANT SURGERY

## 2017-03-16 PROCEDURE — 36415 COLL VENOUS BLD VENIPUNCTURE: CPT | Mod: ZF

## 2017-03-16 PROCEDURE — 80076 HEPATIC FUNCTION PANEL: CPT | Performed by: TRANSPLANT SURGERY

## 2017-03-16 PROCEDURE — 83735 ASSAY OF MAGNESIUM: CPT | Performed by: TRANSPLANT SURGERY

## 2017-03-16 PROCEDURE — 80048 BASIC METABOLIC PNL TOTAL CA: CPT | Performed by: TRANSPLANT SURGERY

## 2017-03-16 PROCEDURE — 85027 COMPLETE CBC AUTOMATED: CPT | Performed by: TRANSPLANT SURGERY

## 2017-03-16 PROCEDURE — 81001 URINALYSIS AUTO W/SCOPE: CPT | Performed by: TRANSPLANT SURGERY

## 2017-03-16 RX ORDER — FUROSEMIDE 20 MG
40 TABLET ORAL DAILY
Qty: 30 TABLET | Refills: 0 | Status: SHIPPED | OUTPATIENT
Start: 2017-03-16 | End: 2017-03-20

## 2017-03-16 RX ORDER — CEPHALEXIN 500 MG/1
500 CAPSULE ORAL 4 TIMES DAILY
Qty: 28 CAPSULE | Refills: 0 | Status: ON HOLD | OUTPATIENT
Start: 2017-03-16 | End: 2017-03-26

## 2017-03-16 NOTE — PROGRESS NOTES
Patient came in for labs and complained of severe back pain and incisional pain.  Incision was clean, small amount of drainage at superior left stapled area, intact.  Jesse labs here today and patient had drop in Hemoglobin from Monday of 1.7 (9.0 Monday to 7.3) and WBC went from 9.5 to 15.0 today.  Contacted Dr. Tran who ordered urine culture and started patient on keflex 500 mg qid for seven days.  Patient was not good historian, told me he had not taken any pain medications today, his Mom confirmed he had his pain meds at 7am, she also stated he unbuckled seat belt while driving and she told him not to get out while they were driving here.  Patient will return to [facility] building on Saturday 3/18 for MRI on back, patient will return Monday to clinic with Dr. Tran.

## 2017-03-16 NOTE — MR AVS SNAPSHOT
After Visit Summary   3/16/2017    Camacho Bhagat    MRN: [id number]           Patient Information     Date Of Birth          1965        Visit Information        Provider Department      3/16/2017 9:00 AM [facility];  SPEC INFUSION [facility] Specialty [facility] Procedure        Today's Diagnoses     Liver replaced by transplant (H)        Edema, unspecified type          Care Instructions    Dear Camacho Bhagat    Thank you for choosing [facility] ([facility]) for your transplant cares.  The following information is a summary of our appointment as well as important reminders.      Additional information: Your follow up from liver transplant on 3/4/2017, and labs.      We look forward in seeing you on your next appointment here at [facility].  Please don t hesitate to call us at [phone] to reschedule any of your appointments or to speak with one of the [facility] registered nurses.  It was a pleasure taking care of you today.    Sincerely,  Estefani Kirby RN  [facility]  Specialty Infusion & Procedure Center  [address] [place] [address]  [address]  Phone:  [phone]    1.  Please take keflex 500 mg four times a ay  2.  Add 40 mg lasix every day at 2pm (in addition to the 80 mg every am).          Follow-ups after your visit        Your next 10 appointments already scheduled     Mar 18, 2017  4:00 PM CDT   (Arrive by 3:45 PM)   MR LUMBAR SPINE W/O & W CONTRAST with [address] [facility] ([facility])    [address] [place] [id number] [phone]           Take your medicines as usual, unless your doctor tells you not to. Bring a list of your current medicines to your exam (including vitamins, minerals and over-the-counter drugs).  You will be given intravenous contrast for this exam. To prepare:   The day before your  exam, drink extra fluids at least six 8-ounce glasses (unless your doctor tells you to restrict your fluids).   Have a blood test (creatinine test) within 30 days of your exam. Go to your clinic or Diagnostic Imaging Department for this test.  The MRI machine uses a strong magnet. Please wear clothes without metal (snaps, zippers). A sweatsuit works well, or we may give you a hospital gown.  Please remove any body piercings and hair extensions before you arrive. You will also remove watches, jewelry, hairpins, wallets, dentures, partial dental plates and hearing aids. You may wear contact lenses, and you may be able to wear your rings. We have a safe place to keep your personal items, but it is safer to leave them at home.   **IMPORTANT** THE INSTRUCTIONS BELOW ARE ONLY FOR THOSE PATIENTS WHO HAVE BEEN TOLD THEY WILL RECEIVE SEDATION OR GENERAL ANESTHESIA DURING THEIR MRI PROCEDURE:  IF YOU WILL RECEIVE SEDATION (take medicine to help you relax during your exam):   You must get the medicine from your doctor before you arrive. Bring the medicine to the exam. Do not take it at home.   Arrive one hour early. Bring someone who can take you home after the test. Your medicine will make you sleepy. After the exam, you may not drive, take a bus or take a taxi by yourself.   No eating 8 hours before your exam. You may have clear liquids up until 4 hours before your exam. (Clear liquids include water, clear tea, black coffee and fruit juice without pulp.)  IF YOU WILL RECEIVE ANESTHESIA (be asleep for your exam):   Arrive 1 1/2 hours early. Bring someone who can take you home after the test. You may not drive, take a bus or take a taxi by yourself.   No eating 8 hours before your exam. You may have clear liquids up until 4 hours before your exam. (Clear liquids include water, clear tea, black coffee and fruit juice without pulp.)  Please call the Imaging Department at your exam site with any questions.            Mar 20, 2017  11:10 AM CDT   (Arrive by 10:55 AM)   Liver Return Post Op with Jovan Tran MD   [facility] Solid Organ Transplant ([facility])    [address] [facility] [address] [phone]            Apr 24, 2017  1:00 PM CDT   (Arrive by 12:45 PM)   Return General Liver with Toñito Camejo MD   [facility] Hepatology ([facility])    [address] [address] [phone]              Who to contact     If you have questions or need follow up information about today's clinic visit or your schedule please contact [facility] SPECIALTY AND PROCEDURE directly at [phone].  Normal or non-critical lab and imaging results will be communicated to you by [employer], letter or phone within 4 business days after the clinic has received the results. If you do not hear from us within 7 days, please contact the clinic through [employer] or phone. If you have a critical or abnormal lab result, we will notify you by phone as soon as possible.  Submit refill requests through [email or web address] or call your pharmacy and they will forward the refill request to us. Please allow 3 business days for your refill to be completed.          Additional Information About Your Visit        [email or web address] Information     [email or web address] gives you secure access to your electronic health record. If you see a primary care provider, you can also send messages to your care team and make appointments. If you have questions, please call your primary care clinic.  If you do not have a primary care provider, please call [phone] and they will assist you.        Care EveryWhere ID     This is your Care EveryWhere ID. This could be used by other organizations to access your [place] medical records  [id number]        Your Vitals Were     Pulse Temperature Respirations Pulse Oximetry BMI (Body Mass Index)       92 99.2  F (37.3  C) (Oral) 16 89% 30.9  kg/m2        Blood Pressure from Last 3 Encounters:   03/16/17 116/57   03/15/17 118/75   03/13/17 137/75    Weight from Last 3 Encounters:   03/16/17 103.3 kg (227 lb 12.8 oz)   03/13/17 105.1 kg (231 lb 9.6 oz)   03/10/17 98.4 kg (216 lb 14.4 oz)              We Performed the Following     Basic metabolic panel     CBC with platelets     Hepatic panel     Magnesium     Phosphorus     Routine UA with micro reflex to culture          Today's Medication Changes          These changes are accurate as of: 3/16/17 10:39 AM.  If you have any questions, ask your nurse or doctor.               Start taking these medicines.        Dose/Directions    cephALEXin 500 MG capsule   Commonly known as:  KEFLEX   Used for:  Liver replaced by transplant (H)        Dose:  500 mg   Take 1 capsule (500 mg) by mouth 4 times daily   Quantity:  28 capsule   Refills:  0         These medicines have changed or have updated prescriptions.        Dose/Directions    * furosemide 20 MG tablet   Commonly known as:  LASIX   This may have changed:  Another medication with the same name was changed. Make sure you understand how and when to take each.   Used for:  Liver replaced by transplant (H)   Changed by:  Jovan Tran MD        Dose:  80 mg   Take 4 tablets (80 mg) by mouth daily   Quantity:  30 tablet   Refills:  3       * furosemide 20 MG tablet   Commonly known as:  LASIX   This may have changed:  when to take this   Used for:  Edema, unspecified type        Dose:  40 mg   Take 2 tablets (40 mg) by mouth daily   Quantity:  30 tablet   Refills:  0       * Notice:  This list has 2 medication(s) that are the same as other medications prescribed for you. Read the directions carefully, and ask your doctor or other care provider to review them with you.         Where to get your medicines      These medications were sent to [place], [place] - [address]  [address],  [facility] [address]    Hours:  TRANSPLANT PHONE NUMBER [phone] Phone:  [phone]     cephALEXin 500 MG capsule    furosemide 20 MG tablet                Primary Care Provider Office Phone # Fax #    Shay Kirkpatrick [phone]        [facility] [address] [facility] [address]  [facility] [address]        Thank you!     Thank you for choosing [facility] SPECIALTY AND PROCEDURE  for your care. Our goal is always to provide you with excellent care. Hearing back from our patients is one way we can continue to improve our services. Please take a few minutes to complete the written survey that you may receive in the mail after your visit with us. Thank you!             Your Updated Medication List - Protect others around you: Learn how to safely use, store and throw away your medicines at www.disposemymeds.org.          This list is accurate as of: 3/16/17 10:39 AM.  Always use your most recent med list.                   Brand Name Dispense Instructions for use    aspirin 325 MG tablet     180 tablet    1 tablet (325 mg) by Oral or Feeding Tube route daily       cephALEXin 500 MG capsule    KEFLEX    28 capsule    Take 1 capsule (500 mg) by mouth 4 times daily       clotrimazole 10 MG Lozg lozenge    MYCELEX    70 each    Place 1 lozenge (1 Betarice) inside cheek 3 times daily       * cyclobenzaprine 10 MG tablet    FLEXERIL    30 tablet    Take 1 tablet (10 mg) by mouth 3 times daily as needed for muscle spasms       * cyclobenzaprine 10 MG tablet    FLEXERIL    90 tablet    Take 1 tablet (10 mg) by mouth 3 times daily as needed for muscle spasms       * cyclobenzaprine 5 MG tablet    FLEXERIL    42 tablet    Take 2 tablets (10 mg) by mouth 3 times daily as needed for muscle spasms       * furosemide 20 MG tablet    LASIX    30 tablet    Take 4 tablets (80 mg) by mouth daily       * furosemide 20 MG tablet    LASIX    30 tablet    Take 2 tablets (40 mg) by mouth daily       *  gabapentin 300 MG capsule    NEURONTIN    60 capsule    Take 2 capsules (600 mg) by mouth At Bedtime       * gabapentin 300 MG capsule    NEURONTIN    90 capsule    Take 1 capsule (300 mg) by mouth 3 times daily       glucagon 1 MG Solr injection     1 each    Inject 1 mg Subcutaneous every 15 minutes as needed for low blood sugar (May repeat x 1 only)       * insulin aspart 100 UNIT/ML injection    NovoLOG PEN    3 mL    DOSE:  1 units per 8 grams of carbohydrate. With meals and snacks. Only chart total amount of units given.  Do not give if pre-prandial glucose is less than 60 mg/dL.       * insulin aspart 100 UNIT/ML injection    NovoLOG PEN    3 mL    DOSE:  1 units per 10 grams of carbohydrate.  Only chart total amount of units given. HOLD CARB COVERAGE FOR  03/10/17 . ENDOCRINE TEAM WILL GIVE ALTERNATE ORDERS PAGER [phone]  Do not give if pre-prandial glucose is less than 60 mg/dL.       * insulin aspart 100 UNIT/ML injection    NovoLOG PEN    3 mL    Inject 1-10 Units Subcutaneous 3 times daily (before meals) For Pre-Meal  - 189 give 1 unit.  For Pre-Meal  - 239 give 2 units.  For Pre-Meal  - 289 give 3 units.  For Pre-Meal  - 339 give 4 units.  For Pre-Meal -399 give 5 units. For Pre-Meal -449 give 6 units. For Pre-Meal BG = or > 450 give 7 units.       * insulin aspart 100 UNIT/ML injection    NovoLOG PEN    3 mL    Inject 1-7 Units Subcutaneous At Bedtime Bedtime Blood Glucose (BG) For  - 249 give 1 units.  For  - 299 give 2 units.  For  - 349 give 3 units. For - 399 give 4 units.  For BG = or > 400 give 5 units.       insulin glargine 100 UNIT/ML injection    LANTUS    3 mL    Inject 45 Units Subcutaneous every 24 hours       LACTOBACILLUS EXTRA STRENGTH Caps     30 capsule    Take 1 capsule by mouth 2 times daily       lactobacillus rhamnosus (GG) capsule     60 capsule    Take 1 capsule by mouth 2 times daily       lidocaine 5 % Patch    LIDODERM     30 patch    Place 1 patch onto the skin every 24 hours       multivitamin, therapeutic with minerals Tabs tablet     30 each    Take 1 tablet by mouth daily       mycophenolate 250 MG capsule    CELLCEPT - GENERIC EQUIVALENT    240 capsule    Take 4 capsules (1,000 mg) by mouth 2 times daily       omeprazole 20 MG CR capsule    priLOSEC    60 capsule    Take 1 capsule (20 mg) by mouth 2 times daily (before meals)       ondansetron 4 MG ODT tab    ZOFRAN-ODT    30 tablet    Take 1 tablet (4 mg) by mouth every 8 hours as needed for nausea       * oxyCODONE 5 MG IR tablet    ROXICODONE    40 tablet    Take 1-2 tablets (5-10 mg) by mouth every 4 hours as needed for moderate to severe pain       * oxyCODONE 10 MG IR tablet    ROXICODONE    30 tablet    Take 1 tablet (10 mg) by mouth every 4 hours as needed for moderate to severe pain       prochlorperazine 5 MG tablet    COMPAZINE    90 tablet    Take 1-2 tablets (5-10 mg) by mouth every 6 hours as needed for nausea or vomiting       senna-docusate 8.6-50 MG per tablet    SENOKOT-S;PERICOLACE    100 tablet    Take 2 tablets by mouth 2 times daily       sulfamethoxazole-trimethoprim 400-80 MG per tablet    BACTRIM/SEPTRA    30 tablet    Take 1 tablet by mouth daily       tacrolimus 1 MG capsule    PROGRAF - GENERIC EQUIVALENT    180 capsule    Take 4 capsules (4 mg) by mouth 2 times daily       valGANciclovir 450 MG tablet    VALCYTE    60 tablet    Take 1 tablet (450 mg) by mouth daily       * Notice:  This list has 13 medication(s) that are the same as other medications prescribed for you. Read the directions carefully, and ask your doctor or other care provider to review them with you.

## 2017-03-16 NOTE — TELEPHONE ENCOUNTER
Spoke with Camacho to increase tacrolimus dose to 6mg Q 12 hours.   Pt's tacrolimus level 3.0.    Pt's mom came on the phone, to review changes she had seen in Camacho in the last day,  Altered mental functioning, increased fluid retention, increased creatinine.  Pt was started on keflex at the ATC today for increased WBC.   Hgb 7.3.     Pt will be having the MRI of his back on Sat.  Plan for labs on Monday with appt to see .

## 2017-03-16 NOTE — PATIENT INSTRUCTIONS
Dear Camacho Bhagat    Thank you for choosing [facility] Specialty [facility]fusion [facility] Procedure [facility] ([facility]) for your transplant cares.  The following information is a summary of our appointment as well as important reminders.      Additional information: Your follow up from liver transplant on 3/4/2017, and labs.      We look forward in seeing you on your next appointment here at [facility].  Please don t hesitate to call us at [phone] to reschedule any of your appointments or to speak with one of the [facility] registered nurses.  It was a pleasure taking care of you today.    Sincerely,  Estefani Kirby, RN  [facility]  Specialty Infusion & Procedure Center  [address] [place] [address]  [address]  Phone:  [phone]    1.  Please take keflex 500 mg four times a ay  2.  Add 40 mg lasix every day at 2pm (in addition to the 80 mg every am).

## 2017-03-17 RX ORDER — TACROLIMUS 1 MG/1
6 CAPSULE ORAL EVERY 12 HOURS
Qty: 360 CAPSULE | Refills: 11 | Status: SHIPPED | OUTPATIENT
Start: 2017-03-17 | End: 2017-03-27

## 2017-03-20 ENCOUNTER — OFFICE VISIT (OUTPATIENT)
Dept: TRANSPLANT | Facility: CLINIC | Age: 52
End: 2017-03-20
Attending: TRANSPLANT SURGERY
Payer: COMMERCIAL

## 2017-03-20 ENCOUNTER — TELEPHONE (OUTPATIENT)
Dept: ORTHOPEDICS | Facility: CLINIC | Age: 52
End: 2017-03-20

## 2017-03-20 VITALS
SYSTOLIC BLOOD PRESSURE: 115 MMHG | TEMPERATURE: 98.1 F | DIASTOLIC BLOOD PRESSURE: 69 MMHG | OXYGEN SATURATION: 98 % | HEART RATE: 90 BPM | RESPIRATION RATE: 16 BRPM

## 2017-03-20 DIAGNOSIS — Z94.4 LIVER REPLACED BY TRANSPLANT (H): ICD-10-CM

## 2017-03-20 DIAGNOSIS — M54.50 ACUTE LOW BACK PAIN: ICD-10-CM

## 2017-03-20 DIAGNOSIS — Z79.60 LONG-TERM USE OF IMMUNOSUPPRESSANT MEDICATION: ICD-10-CM

## 2017-03-20 DIAGNOSIS — R18.8 ASCITES: ICD-10-CM

## 2017-03-20 DIAGNOSIS — D84.9 IMMUNOSUPPRESSION (H): ICD-10-CM

## 2017-03-20 DIAGNOSIS — Z94.4 LIVER REPLACED BY TRANSPLANT (H): Primary | ICD-10-CM

## 2017-03-20 DIAGNOSIS — Z94.4 HISTORY OF LIVER TRANSPLANT (H): Primary | ICD-10-CM

## 2017-03-20 LAB
ALBUMIN SERPL-MCNC: 2.2 G/DL (ref 3.4–5)
ALP SERPL-CCNC: 213 U/L (ref 40–150)
ALT SERPL W P-5'-P-CCNC: 20 U/L (ref 0–70)
ANION GAP SERPL CALCULATED.3IONS-SCNC: 10 MMOL/L (ref 3–14)
AST SERPL W P-5'-P-CCNC: 21 U/L (ref 0–45)
BILIRUB DIRECT SERPL-MCNC: 1.7 MG/DL (ref 0–0.2)
BILIRUB SERPL-MCNC: 2 MG/DL (ref 0.2–1.3)
BUN SERPL-MCNC: 48 MG/DL (ref 7–30)
CALCIUM SERPL-MCNC: 8.6 MG/DL (ref 8.5–10.1)
CHLORIDE SERPL-SCNC: 97 MMOL/L (ref 94–109)
CO2 SERPL-SCNC: 24 MMOL/L (ref 20–32)
CREAT SERPL-MCNC: 1.75 MG/DL (ref 0.66–1.25)
ERYTHROCYTE [DISTWIDTH] IN BLOOD BY AUTOMATED COUNT: 17.7 % (ref 10–15)
GFR SERPL CREATININE-BSD FRML MDRD: 41 ML/MIN/1.7M2
GLUCOSE SERPL-MCNC: 89 MG/DL (ref 70–99)
HCT VFR BLD AUTO: 21.4 % (ref 40–53)
HGB BLD-MCNC: 7.2 G/DL (ref 13.3–17.7)
MAGNESIUM SERPL-MCNC: 1.6 MG/DL (ref 1.6–2.3)
MCH RBC QN AUTO: 32.1 PG (ref 26.5–33)
MCHC RBC AUTO-ENTMCNC: 33.6 G/DL (ref 31.5–36.5)
MCV RBC AUTO: 96 FL (ref 78–100)
PHOSPHATE SERPL-MCNC: 5.3 MG/DL (ref 2.5–4.5)
PLATELET # BLD AUTO: 143 10E9/L (ref 150–450)
POTASSIUM SERPL-SCNC: 5.9 MMOL/L (ref 3.4–5.3)
PROT SERPL-MCNC: 6 G/DL (ref 6.8–8.8)
RBC # BLD AUTO: 2.24 10E12/L (ref 4.4–5.9)
SODIUM SERPL-SCNC: 131 MMOL/L (ref 133–144)
TACROLIMUS BLD-MCNC: 12.1 UG/L (ref 5–15)
TME LAST DOSE: NORMAL H
WBC # BLD AUTO: 6.7 10E9/L (ref 4–11)

## 2017-03-20 PROCEDURE — 84100 ASSAY OF PHOSPHORUS: CPT | Performed by: TRANSPLANT SURGERY

## 2017-03-20 PROCEDURE — 80076 HEPATIC FUNCTION PANEL: CPT | Performed by: TRANSPLANT SURGERY

## 2017-03-20 PROCEDURE — 85027 COMPLETE CBC AUTOMATED: CPT | Performed by: TRANSPLANT SURGERY

## 2017-03-20 PROCEDURE — 99211 OFF/OP EST MAY X REQ PHY/QHP: CPT

## 2017-03-20 PROCEDURE — 80048 BASIC METABOLIC PNL TOTAL CA: CPT | Performed by: TRANSPLANT SURGERY

## 2017-03-20 PROCEDURE — 83735 ASSAY OF MAGNESIUM: CPT | Performed by: TRANSPLANT SURGERY

## 2017-03-20 PROCEDURE — 36415 COLL VENOUS BLD VENIPUNCTURE: CPT

## 2017-03-20 PROCEDURE — 80197 ASSAY OF TACROLIMUS: CPT | Performed by: TRANSPLANT SURGERY

## 2017-03-20 RX ORDER — MEGESTROL ACETATE 40 MG/ML
400 SUSPENSION ORAL 2 TIMES DAILY
Qty: 600 ML | Refills: 3 | Status: SHIPPED | OUTPATIENT
Start: 2017-03-20 | End: 2017-05-11

## 2017-03-20 RX ORDER — OXYCODONE HYDROCHLORIDE 10 MG/1
10 TABLET ORAL EVERY 6 HOURS PRN
Qty: 60 TABLET | Refills: 0 | Status: ON HOLD | OUTPATIENT
Start: 2017-03-20 | End: 2017-03-26

## 2017-03-20 RX ORDER — FUROSEMIDE 20 MG
80 TABLET ORAL DAILY
Qty: 80 TABLET | Refills: 3 | Status: ON HOLD | OUTPATIENT
Start: 2017-03-20 | End: 2017-03-26

## 2017-03-20 NOTE — NURSING NOTE
Chief Complaint   Patient presents with     Liver Transplant     POD 16       Initial /69 (BP Location: Right arm, Patient Position: Chair, Cuff Size: Adult Regular)  Pulse 90  Temp 98.1  F (36.7  C) (Oral)  Resp 16  SpO2 98% Estimated body mass index is 30.9 kg/(m^2) as calculated from the following:    Height as of 3/4/17: 1.829 m (6').    Weight as of 3/16/17: 103.3 kg (227 lb 12.8 oz).  Medication Reconciliation: complete

## 2017-03-20 NOTE — NURSING NOTE
Chief Complaint   Patient presents with     Blood Draw     Patient is here today for labs to be drawn via his right AC area by vascular access nurse.

## 2017-03-20 NOTE — MR AVS SNAPSHOT
After Visit Summary   3/20/2017    Camacho Bhagat    MRN: [id number]           Patient Information     Date Of Birth          1965        Visit Information        Provider Department      3/20/2017 11:10 AM Jovan Tran MD [facility] Solid Organ Transplant        Today's Diagnoses     Liver replaced by transplant (H)    -  1    Immunosuppression (H)           Follow-ups after your visit        Your next 10 appointments already scheduled     Mar 20, 2017 11:10 AM CDT   (Arrive by 10:55 AM)   Liver Return Post Op with Jovan Tran MD   [facility] Solid Organ Transplant ([facility])    [address] [place] [id number]   [phone]            Mar 28, 2017 11:00 AM CDT   Evaluation with Leela Cuadra, PT   [facility] ([facility])    [address] [place] [id number]   [phone]            Apr 03, 2017 11:00 AM CDT   LAB with  LAB   [facility] Lab ([facility])    [address] [place] [id number] [phone]           Patient must bring picture ID.  Patient should be prepared to give a urine specimen  Please do not eat 10-12 hours before your appointment if you are coming in fasting for labs on lipids, cholesterol, or glucose (sugar).  Pregnant women should follow their Care Team instructions. Water with medications is okay. Do not drink coffee or other fluids.   If you have concerns about taking  your medications, please ask at office or if scheduling via [facility], send a message by clicking on Secure Messaging, Message Your Care Team.            Apr 03, 2017 11:50 AM CDT   (Arrive by 11:35 AM)   Liver Return Post Op with Jovan Tran MD   [facility] Solid Organ Transplant ([facility])    [address] [place] [id number] [phone]            Apr 24, 2017  1:00 PM CDT    (Arrive by 12:45 PM)   Return General Liver with Toñito Camejo MD   [facility] ([facility])    [address]  3rd Floor  [facility] [address] [phone]              Who to contact     If you have questions or need follow up information about today's clinic visit or your schedule please contact [facility] SOLID ORGAN TRANSPLANT directly at [phone].  Normal or non-critical lab and imaging results will be communicated to you by [employer], letter or phone within 4 business days after the clinic has received the results. If you do not hear from us within 7 days, please contact the clinic through [employer] or phone. If you have a critical or abnormal lab result, we will notify you by phone as soon as possible.  Submit refill requests through [employer] or call your pharmacy and they will forward the refill request to us. Please allow 3 business days for your refill to be completed.          Additional Information About Your Visit        Be[employer] Information     [employer] gives you secure access to your electronic health record. If you see a primary care provider, you can also send messages to your care team and make appointments. If you have questions, please call your primary care clinic.  If you do not have a primary care provider, please call [phone] and they will assist you.        Care EveryWhere ID     This is your Care EveryWhere ID. This could be used by other organizations to access your [place] medical records  [id number]        Your Vitals Were     Pulse Temperature Respirations Pulse Oximetry          90 98.1  F (36.7  C) (Oral) 16 98%         Blood Pressure from Last 3 Encounters:   03/20/17 115/69   03/16/17 109/57   03/15/17 118/75    Weight from Last 3 Encounters:   03/16/17 103.3 kg (227 lb 12.8 oz)   03/13/17 105.1 kg (231 lb 9.6 oz)   03/10/17 98.4 kg (216 lb 14.4 oz)              Today, you had the following     No orders found for display          Today's Medication Changes          These changes are accurate as of: 3/20/17 10:36 AM.  If you have any questions, ask your nurse or doctor.               Start taking these medicines.        Dose/Directions    megestrol 40 MG/ML suspension   Commonly known as:  MEGACE   Used for:  Liver replaced by transplant (H)   Started by:  Jovan Tran MD        Dose:  400 mg   Take 10 mLs (400 mg) by mouth 2 times daily   Quantity:  600 mL   Refills:  3         These medicines have changed or have updated prescriptions.        Dose/Directions    * oxyCODONE 5 MG IR tablet   Commonly known as:  ROXICODONE   This may have changed:  Another medication with the same name was added. Make sure you understand how and when to take each.   Used for:  Liver transplant recipient (H)        Dose:  5-10 mg   Take 1-2 tablets (5-10 mg) by mouth every 4 hours as needed for moderate to severe pain   Quantity:  40 tablet   Refills:  0       * oxyCODONE 10 MG IR tablet   Commonly known as:  ROXICODONE   This may have changed:  Another medication with the same name was added. Make sure you understand how and when to take each.   Used for:  Immunosuppression (H)   Changed by:  Jovan Tran MD        Dose:  10 mg   Take 1 tablet (10 mg) by mouth every 4 hours as needed for moderate to severe pain   Quantity:  30 tablet   Refills:  0       * oxyCODONE 10 MG IR tablet   Commonly known as:  ROXICODONE   This may have changed:  You were already taking a medication with the same name, and this prescription was added. Make sure you understand how and when to take each.   Used for:  Liver replaced by transplant (H)   Changed by:  Jovan Tran MD        Dose:  10 mg   Take 1 tablet (10 mg) by mouth every 6 hours as needed for moderate to severe pain   Quantity:  60 tablet   Refills:  0       * Notice:  This list has 3 medication(s) that are the same as other medications prescribed for you. Read the directions carefully, and  ask your doctor or other care provider to review them with you.         Where to get your medicines      These medications were sent to [facility] - [place], [place] - [address]  [address], [facility] [address]    Hours:  TRANSPLANT PHONE NUMBER [phone] Phone:  [phone]     megestrol 40 MG/ML suspension         Some of these will need a paper prescription and others can be bought over the counter.  Ask your nurse if you have questions.     Bring a paper prescription for each of these medications     oxyCODONE 10 MG IR tablet                Primary Care Provider Office Phone # Fax #    Shay Kirkpatrick [phone]        [facility] [address] [place] [phone]  [facility] [address]        Thank you!     Thank you for choosing [facility] SOLID ORGAN TRANSPLANT  for your care. Our goal is always to provide you with excellent care. Hearing back from our patients is one way we can continue to improve our services. Please take a few minutes to complete the written survey that you may receive in the mail after your visit with us. Thank you!             Your Updated Medication List - Protect others around you: Learn how to safely use, store and throw away your medicines at www.disposemymeds.org.          This list is accurate as of: 3/20/17 10:36 AM.  Always use your most recent med list.                   Brand Name Dispense Instructions for use    aspirin 325 MG tablet     180 tablet    1 tablet (325 mg) by Oral or Feeding Tube route daily       cephALEXin 500 MG capsule    KEFLEX    28 capsule    Take 1 capsule (500 mg) by mouth 4 times daily       clotrimazole 10 MG Lozg lozenge    MYCELEX    70 each    Place 1 lozenge (1 Beatrice) inside cheek 3 times daily       * cyclobenzaprine 10 MG tablet    FLEXERIL    30 tablet    Take 1 tablet (10 mg) by mouth 3 times daily as needed for muscle spasms       * cyclobenzaprine 10 MG tablet    FLEXERIL    90  tablet    Take 1 tablet (10 mg) by mouth 3 times daily as needed for muscle spasms       * cyclobenzaprine 5 MG tablet    FLEXERIL    42 tablet    Take 2 tablets (10 mg) by mouth 3 times daily as needed for muscle spasms       * furosemide 20 MG tablet    LASIX    30 tablet    Take 4 tablets (80 mg) by mouth daily       * furosemide 20 MG tablet    LASIX    30 tablet    Take 2 tablets (40 mg) by mouth daily       * gabapentin 300 MG capsule    NEURONTIN    60 capsule    Take 2 capsules (600 mg) by mouth At Bedtime       * gabapentin 300 MG capsule    NEURONTIN    90 capsule    Take 1 capsule (300 mg) by mouth 3 times daily       glucagon 1 MG Solr injection     1 each    Inject 1 mg Subcutaneous every 15 minutes as needed for low blood sugar (May repeat x 1 only)       * insulin aspart 100 UNIT/ML injection    NovoLOG PEN    3 mL    DOSE:  1 units per 8 grams of carbohydrate. With meals and snacks. Only chart total amount of units given.  Do not give if pre-prandial glucose is less than 60 mg/dL.       * insulin aspart 100 UNIT/ML injection    NovoLOG PEN    3 mL    DOSE:  1 units per 10 grams of carbohydrate.  Only chart total amount of units given. HOLD CARB COVERAGE FOR  03/10/17 . ENDOCRINE TEAM WILL GIVE ALTERNATE ORDERS PAGER [phone]  Do not give if pre-prandial glucose is less than 60 mg/dL.       * insulin aspart 100 UNIT/ML injection    NovoLOG PEN    3 mL    Inject 1-10 Units Subcutaneous 3 times daily (before meals) For Pre-Meal  - 189 give 1 unit.  For Pre-Meal  - 239 give 2 units.  For Pre-Meal  - 289 give 3 units.  For Pre-Meal  - 339 give 4 units.  For Pre-Meal -399 give 5 units. For Pre-Meal -449 give 6 units. For Pre-Meal BG = or > 450 give 7 units.       * insulin aspart 100 UNIT/ML injection    NovoLOG PEN    3 mL    Inject 1-7 Units Subcutaneous At Bedtime Bedtime Blood Glucose (BG) For  - 249 give 1 units.  For  - 299 give 2 units.  For  - 349  give 3 units. For - 399 give 4 units.  For BG = or > 400 give 5 units.       insulin glargine 100 UNIT/ML injection    LANTUS    3 mL    Inject 45 Units Subcutaneous every 24 hours       LACTOBACILLUS EXTRA STRENGTH Caps     30 capsule    Take 1 capsule by mouth 2 times daily       lactobacillus rhamnosus (GG) capsule     60 capsule    Take 1 capsule by mouth 2 times daily       lidocaine 5 % Patch    LIDODERM    30 patch    Place 1 patch onto the skin every 24 hours       megestrol 40 MG/ML suspension    MEGACE    600 mL    Take 10 mLs (400 mg) by mouth 2 times daily       multivitamin, therapeutic with minerals Tabs tablet     30 each    Take 1 tablet by mouth daily       mycophenolate 250 MG capsule    CELLCEPT - GENERIC EQUIVALENT    240 capsule    Take 4 capsules (1,000 mg) by mouth 2 times daily       omeprazole 20 MG CR capsule    priLOSEC    60 capsule    Take 1 capsule (20 mg) by mouth 2 times daily (before meals)       ondansetron 4 MG ODT tab    ZOFRAN-ODT    30 tablet    Take 1 tablet (4 mg) by mouth every 8 hours as needed for nausea       * oxyCODONE 5 MG IR tablet    ROXICODONE    40 tablet    Take 1-2 tablets (5-10 mg) by mouth every 4 hours as needed for moderate to severe pain       * oxyCODONE 10 MG IR tablet    ROXICODONE    30 tablet    Take 1 tablet (10 mg) by mouth every 4 hours as needed for moderate to severe pain       * oxyCODONE 10 MG IR tablet    ROXICODONE    60 tablet    Take 1 tablet (10 mg) by mouth every 6 hours as needed for moderate to severe pain       prochlorperazine 5 MG tablet    COMPAZINE    90 tablet    Take 1-2 tablets (5-10 mg) by mouth every 6 hours as needed for nausea or vomiting       senna-docusate 8.6-50 MG per tablet    SENOKOT-S;PERICOLACE    100 tablet    Take 2 tablets by mouth 2 times daily       sulfamethoxazole-trimethoprim 400-80 MG per tablet    BACTRIM/SEPTRA    30 tablet    Take 1 tablet by mouth daily       tacrolimus 1 MG capsule    PROGRAF -  GENERIC EQUIVALENT    360 capsule    Take 6 capsules (6 mg) by mouth every 12 hours       valGANciclovir 450 MG tablet    VALCYTE    60 tablet    Take 1 tablet (450 mg) by mouth daily       * Notice:  This list has 14 medication(s) that are the same as other medications prescribed for you. Read the directions carefully, and ask your doctor or other care provider to review them with you.

## 2017-03-20 NOTE — PROGRESS NOTES
HPI      ROS      Physical Exam    Transplant Surgery -OUTPATIENT IMMUNOSUPPRESSION PROGRESS NOTE    Date of Visit: 03/20/2017    Transplants:  3/4/2017 (Liver); Postoperative day:  16  ASSESMENT AND PLAN:  1.Graft Function: Liver allograft: no rejection or technical problems.    2.Immunosuppression Management: keep tacrolimus levels 5-8ng/dL  3.Hypertension: ok  4.Renal Function: elevated creatinine  5.Lab frequency: twice weekly  6.Other:  Back pain refer to Ortho/Neurosurgery  Ascites continue lasix  Megace for lower appetite      Date: March 20, 2017    Transplant:  [x]                             Liver [x]                              Kidney []                             Pancreas []                              Other:             Chief Complaint:Liver Transplant (POD 16)  Has back pain, the wound drainage is better  History of Present Illness:  Post liver transplant    Patient Active Problem List   Diagnosis     Diabetes mellitus, type 2 (H)     Carpal tunnel syndrome     Essential hypertension     Personal history of thrombophlebitis     Esophageal reflux     Depressive disorder, not elsewhere classified     Hyperlipidemia     Sleep apnea     Testicular hypofunction     TYPE 2 DIABETES, HBA1C GOAL < 7%     HYPERLIPIDEMIA LDL GOAL <100     Fibromyalgia     OA (osteoarthritis)     Cirrhosis (H)     Sicca syndrome (H)     Knee pain     Primary localized osteoarthrosis, lower leg     Cerebrovascular accident (H)     Diabetes mellitus with neurological manifestation (H)     Medication refill- do not delete      Pain medication agreement signed - Shay Kirkpatrick 2/7/14     Hepatocellular carcinoma (H)     Hepatic cirrhosis, unspecified hepatic cirrhosis type (H)     Preoperative cardiovascular examination     HCC (hepatocellular carcinoma) (H)     Edema     Cellulitis of scrotum     Uncontrolled type 2 diabetes mellitus with hyperglycemia, with long-term current use of insulin (H)     Debility     Type 2 diabetes  mellitus without complication, with long-term current use of insulin (H)     Altered mental status, unspecified     Hepatic encephalopathy (H)     Urinary tract infection associated with catheterization of urinary tract (H)     Altered mental status     Hypoglycemia     Hepatic cirrhosis (H)     Osteoarthritis     Rheumatoid arthritis (H)     Hyperkalemia     Liver transplant recipient (H)     Acute kidney injury (H)     Immunosuppressed status (H)     SOCIAL /FAMILY HISTORY: [x]                  No recent change    Past Medical History   Diagnosis Date     Acute venous embolism and thrombosis of other specified veins 11/01     Deep vein thrombophlebitis, filter placed     Cancer (H)      Depressive disorder, not elsewhere classified      Esophageal reflux      Fibromyalgia 1/2009     dx with Dr Benitez( Rheum)     Osteoarthritis      Other and unspecified hyperlipidemia      Other chronic nonalcoholic liver disease      Fatty liver      Other testicular hypofunction      Pneumonia, organism unspecified 10-01     Included ARDS, sepsis, and  acute renal failure; hospitalized     Rheumatoid arthritis(714.0)      Type II or unspecified type diabetes mellitus without mention of complication, not stated as uncontrolled 11/01     Managed by endocrinology     Unspecified essential hypertension 11/01     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     Unspecified sleep apnea      Uses BiPAP     Past Surgical History   Procedure Laterality Date     C nonspecific procedure       tracheostomy     C nonspecific procedure       repair of deviated septum     C nonspecific procedure  2007     Rt knee arthroscopy     C total knee arthroplasty  2008     Right knee arthroscopy     Cholecystectomy       Esophagoscopy, gastroscopy, duodenoscopy (egd), combined N/A 8/4/2016     Procedure: COMBINED ESOPHAGOSCOPY, GASTROSCOPY, DUODENOSCOPY (EGD), BIOPSY SINGLE OR MULTIPLE;  Surgeon: Trent Pederson MD;  Location:  GI     Transplant  liver recipient  donor N/A 3/4/2017     Procedure: TRANSPLANT LIVER RECIPIENT  DONOR;  Surgeon: Jovan Tran MD;  Location:  OR     Bench liver N/A 3/4/2017     Procedure: BENCH LIVER;  Surgeon: Jovan Tran MD;  Location:  OR     Social History     Social History     Marital status:      Spouse name: N/A     Number of children: 1     Years of education: N/A     Occupational History            Social History Main Topics     Smoking status: Former Smoker     Smokeless tobacco: Former User     Types: Chew     Quit date: 10/8/2015      Comment: Has used chewing tobacco since age 15 , chewed for 20yrs      Alcohol use No      Comment: last drink about a year ago (quit )     Drug use: No     Sexual activity: Yes     Partners: Female     Other Topics Concern     Not on file     Social History Narrative    uSED TO BE      Back in school now         Prescription Medications as of 3/20/2017             tacrolimus (PROGRAF - GENERIC EQUIVALENT) 1 MG capsule Take 6 capsules (6 mg) by mouth every 12 hours    cephALEXin (KEFLEX) 500 MG capsule Take 1 capsule (500 mg) by mouth 4 times daily    furosemide (LASIX) 20 MG tablet Take 2 tablets (40 mg) by mouth daily    furosemide (LASIX) 20 MG tablet Take 4 tablets (80 mg) by mouth daily    cyclobenzaprine (FLEXERIL) 10 MG tablet Take 1 tablet (10 mg) by mouth 3 times daily as needed for muscle spasms    oxyCODONE (ROXICODONE) 10 MG IR tablet Take 1 tablet (10 mg) by mouth every 4 hours as needed for moderate to severe pain    lidocaine (LIDODERM) 5 % Patch Place 1 patch onto the skin every 24 hours    LACTOBACILLUS EXTRA STRENGTH CAPS Take 1 capsule by mouth 2 times daily    prochlorperazine (COMPAZINE) 5 MG tablet Take 1-2 tablets (5-10 mg) by mouth every 6 hours as needed for nausea or vomiting    cyclobenzaprine (FLEXERIL) 5 MG tablet Take 2 tablets (10 mg) by mouth 3 times daily as needed  for muscle spasms    gabapentin (NEURONTIN) 300 MG capsule Take 1 capsule (300 mg) by mouth 3 times daily    oxyCODONE (ROXICODONE) 5 MG IR tablet Take 1-2 tablets (5-10 mg) by mouth every 4 hours as needed for moderate to severe pain    aspirin 325 MG tablet 1 tablet (325 mg) by Oral or Feeding Tube route daily    insulin aspart (NOVOLOG PEN) 100 UNIT/ML injection DOSE:  1 units per 8 grams of carbohydrate. With meals and snacks. Only chart total amount of units given.  Do not give if pre-prandial glucose is less than 60 mg/dL.    insulin glargine (LANTUS) 100 UNIT/ML injection Inject 45 Units Subcutaneous every 24 hours    valGANciclovir (VALCYTE) 450 MG tablet Take 1 tablet (450 mg) by mouth daily    mycophenolate (CELLCEPT - GENERIC EQUIVALENT) 250 MG capsule Take 4 capsules (1,000 mg) by mouth 2 times daily    senna-docusate (SENOKOT-S;PERICOLACE) 8.6-50 MG per tablet Take 2 tablets by mouth 2 times daily    sulfamethoxazole-trimethoprim (BACTRIM/SEPTRA) 400-80 MG per tablet Take 1 tablet by mouth daily    clotrimazole (MYCELEX) 10 MG LOZG lozenge Place 1 lozenge (1 Beatrice) inside cheek 3 times daily    multivitamin, therapeutic with minerals (THERA-VIT-M) TABS tablet Take 1 tablet by mouth daily    insulin aspart (NOVOLOG PEN) 100 UNIT/ML injection DOSE:  1 units per 10 grams of carbohydrate.  Only chart total amount of units given. HOLD CARB COVERAGE FOR  03/10/17 . ENDOCRINE TEAM WILL GIVE ALTERNATE ORDERS PAGER [phone]   Do not give if pre-prandial glucose is less than 60 mg/dL.    insulin aspart (NOVOLOG PEN) 100 UNIT/ML injection Inject 1-10 Units Subcutaneous 3 times daily (before meals) For Pre-Meal  - 189 give 1 unit.   For Pre-Meal  - 239 give 2 units.   For Pre-Meal  - 289 give 3 units.   For Pre-Meal  - 339 give 4 units.   For Pre-Meal -399 give 5 units.  For Pre-Meal -449 give 6 units.  For Pre-Meal BG = or > 450 give 7 units.    insulin aspart (NOVOLOG PEN) 100  UNIT/ML injection Inject 1-7 Units Subcutaneous At Bedtime Bedtime Blood Glucose (BG)  For  - 249 give 1 units.   For  - 299 give 2 units.   For  - 349 give 3 units.  For - 399 give 4 units.   For BG = or > 400 give 5 units.    gabapentin (NEURONTIN) 300 MG capsule Take 2 capsules (600 mg) by mouth At Bedtime    ondansetron (ZOFRAN-ODT) 4 MG ODT tab Take 1 tablet (4 mg) by mouth every 8 hours as needed for nausea    cyclobenzaprine (FLEXERIL) 10 MG tablet Take 1 tablet (10 mg) by mouth 3 times daily as needed for muscle spasms    omeprazole (PRILOSEC) 20 MG CR capsule Take 1 capsule (20 mg) by mouth 2 times daily (before meals)    glucagon 1 MG SOLR injection Inject 1 mg Subcutaneous every 15 minutes as needed for low blood sugar (May repeat x 1 only)    lactobacillus rhamnosus, GG, (CULTURELL) capsule Take 1 capsule by mouth 2 times daily      Facility Administered Medications as of 3/20/2017             albumin human 25 % injection 12.5 g Inject 50 mLs (12.5 g) into the vein 4 times daily as needed for other (12.5 grams per liter of ascitic fluid removed up to 50 grams)        Erythromycin and Vioxx   REVIEW OF SYSTEMS (check box if normal)  [x]               GENERAL  [x]                 PULMONARY [x]                GENITOURINARY  [x]                CNS                 [x]                 CARDIAC  [x]                 ENDOCRINE  [x]                EARS,NOSE,THROAT [x]                 GASTROINTESTINAL [x]                 NEUROLOGIC    [x]                MUSCLOSKELTAL  [x]                  HEMATOLOGY      PHYSICAL EXAM (check box if normal)/69 (BP Location: Right arm, Patient Position: Chair, Cuff Size: Adult Regular)  Pulse 90  Temp 98.1  F (36.7  C) (Oral)  Resp 16  SpO2 98%      [x]            GENERAL:    [x]       EYES:  ICTERIC   []        YES  []                    NO  [x]           EXTREMITIES: Clubbing []       Y     [x]           N    [x]           EARS, NOSE, THROAT: Membranes  Moist    YES   [x]                   NO []                  [x]           LUNGS:  CLEAR    YES       [x]                  NO    []                                [x]           SKIN: Jaundice           YES       []                  NO    [x]                   Rash: YES       []                  NO    [x]                                     [x]             HEART: Regular Rate          YES       [x]                  NO    []                   Incision Clean:  YES       [x]                  NO    []                                [x]                    ABDOMEN: Ascites present, wound mildly red Organomegaly YES       []                  NO    [x]                       [x]                    NEUROLOGICAL:  Nonfocal  YES       [x]                  NO    []                       [x]                    Hernia YES       []                  NO    [x]                   PSYCHIATRIC:  Appropriate  YES       [x]                  NO    []                       OTHER:                                                                                                   PAIN SCALE:: 3

## 2017-03-20 NOTE — NURSING NOTE
Camacho Bhagat is a 51 year old male with a diagnosis of HCC, [place]    3/4/2017 (Liver)       Medications:  Immunosuppressant Medications     Immunosuppressive Agents Disp Start End    tacrolimus (PROGRAF - GENERIC EQUIVALENT) 1 MG capsule 360 capsule 3/17/2017     Sig - Route: Take 6 capsules (6 mg) by mouth every 12 hours - Oral    Class: E-Prescribe    Notes to Pharmacy: Dose change    mycophenolate (CELLCEPT - GENERIC EQUIVALENT) 250 MG capsule 240 capsule 3/10/2017     Sig - Route: Take 4 capsules (1,000 mg) by mouth 2 times daily - Oral    Class: E-Prescribe             Lab results:   Lab Results   Component Value Date    AST 21 03/20/2017     Lab Results   Component Value Date    ALT 20 03/20/2017     Lab Results   Component Value Date    BILICONJ 0.0 06/28/2012      Lab Results   Component Value Date    BILITOTAL 2.0 03/20/2017     Lab Results   Component Value Date    ALBUMIN 2.2 03/20/2017     Lab Results   Component Value Date    PROTTOTAL 6.0 03/20/2017      Lab Results   Component Value Date    ALKPHOS 213 03/20/2017     Creatinine   Date Value Ref Range Status   03/20/2017 1.75 (H) 0.66 - 1.25 mg/dL Final        Imaging:   MRI of lumbar spine reviewed per , plan to refer pt to see .  No images are attached to the encounter.     Recent events:  C/o of back pain, using lidocaine patches, pain medications,  Not eating well, to start on megace 10mls BID.       Lasix decreased to 80mg q day x 1 week, then plan to decrease to 40mg q day starting on 3/27.       Patient needs:  appt with neurosurgery to assess changes in lumbar spine, seen in MRI done on 3/18  Return appt with  in 2 weeks.  Pt to contact his endocrinologist   at [facility] for diabetes management.   Per  pt to use sliding scale when BS > 220

## 2017-03-20 NOTE — LETTER
3/20/2017       RE: Camacho Bhagat  [id number] [facility] [address]     Dear Colleague,    Thank you for referring your patient, Camacho Bhagat, to the [facility] SOLID ORGAN TRANSPLANT at [facility]. Please see a copy of my visit note below.    HPI      ROS      Physical Exam    Transplant Surgery -OUTPATIENT IMMUNOSUPPRESSION PROGRESS NOTE    Date of Visit: 03/20/2017    Transplants:  3/4/2017 (Liver); Postoperative day:  16  ASSESMENT AND PLAN:  1.Graft Function: Liver allograft: no rejection or technical problems.    2.Immunosuppression Management: keep tacrolimus levels 5-8ng/dL  3.Hypertension: ok  4.Renal Function: elevated creatinine  5.Lab frequency: twice weekly  6.Other:  Back pain refer to Ortho/Neurosurgery  Ascites continue lasix  Megace for lower appetite      Date: March 20, 2017    Transplant:  [x]                             Liver [x]                              Kidney []                             Pancreas []                              Other:             Chief Complaint:Liver Transplant (POD 16)  Has back pain, the wound drainage is better  History of Present Illness:  Post liver transplant    Patient Active Problem List   Diagnosis     Diabetes mellitus, type 2 (H)     Carpal tunnel syndrome     Essential hypertension     Personal history of thrombophlebitis     Esophageal reflux     Depressive disorder, not elsewhere classified     Hyperlipidemia     Sleep apnea     Testicular hypofunction     TYPE 2 DIABETES, HBA1C GOAL < 7%     HYPERLIPIDEMIA LDL GOAL <100     Fibromyalgia     OA (osteoarthritis)     Cirrhosis (H)     Sicca syndrome (H)     Knee pain     Primary localized osteoarthrosis, lower leg     Cerebrovascular accident (H)     Diabetes mellitus with neurological manifestation (H)     Medication refill- do not delete      Pain medication agreement signed - Shay Kirkpatrick 2/7/14     Hepatocellular carcinoma (H)     Hepatic cirrhosis,  unspecified hepatic cirrhosis type (H)     Preoperative cardiovascular examination     HCC (hepatocellular carcinoma) (H)     Edema     Cellulitis of scrotum     Uncontrolled type 2 diabetes mellitus with hyperglycemia, with long-term current use of insulin (H)     Debility     Type 2 diabetes mellitus without complication, with long-term current use of insulin (H)     Altered mental status, unspecified     Hepatic encephalopathy (H)     Urinary tract infection associated with catheterization of urinary tract (H)     Altered mental status     Hypoglycemia     Hepatic cirrhosis (H)     Osteoarthritis     Rheumatoid arthritis (H)     Hyperkalemia     Liver transplant recipient (H)     Acute kidney injury (H)     Immunosuppressed status (H)     SOCIAL /FAMILY HISTORY: [x]                  No recent change    Past Medical History   Diagnosis Date     Acute venous embolism and thrombosis of other specified veins 11/01     Deep vein thrombophlebitis, filter placed     Cancer (H)      Depressive disorder, not elsewhere classified      Esophageal reflux      Fibromyalgia 1/2009     dx with Dr Benitez( Rheum)     Osteoarthritis      Other and unspecified hyperlipidemia      Other chronic nonalcoholic liver disease      Fatty liver      Other testicular hypofunction      Pneumonia, organism unspecified 10-01     Included ARDS, sepsis, and  acute renal failure; hospitalized     Rheumatoid arthritis(714.0)      Type II or unspecified type diabetes mellitus without mention of complication, not stated as uncontrolled 11/01     Managed by endocrinology     Unspecified essential hypertension 11/01     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     Unspecified sleep apnea      Uses BiPAP     Past Surgical History   Procedure Laterality Date     C nonspecific procedure       tracheostomy     C nonspecific procedure       repair of deviated septum     C nonspecific procedure  2007     Rt knee arthroscopy     C total knee arthroplasty        Right knee arthroscopy     Cholecystectomy       Esophagoscopy, gastroscopy, duodenoscopy (egd), combined N/A 2016     Procedure: COMBINED ESOPHAGOSCOPY, GASTROSCOPY, DUODENOSCOPY (EGD), BIOPSY SINGLE OR MULTIPLE;  Surgeon: Trent Pederson MD;  Location:  GI     Transplant liver recipient  donor N/A 3/4/2017     Procedure: TRANSPLANT LIVER RECIPIENT  DONOR;  Surgeon: Jovan Tran MD;  Location:  OR     Bench liver N/A 3/4/2017     Procedure: BENCH LIVER;  Surgeon: Jovan Tran MD;  Location:  OR     Social History     Social History     Marital status:      Spouse name: N/A     Number of children: 1     Years of education: N/A     Occupational History            Social History Main Topics     Smoking status: Former Smoker     Smokeless tobacco: Former User     Types: Chew     Quit date: 10/8/2015      Comment: Has used chewing tobacco since age 15 , chewed for 20yrs      Alcohol use No      Comment: last drink about a year ago (quit )     Drug use: No     Sexual activity: Yes     Partners: Female     Other Topics Concern     Not on file     Social History Narrative    uSED TO BE      Back in school now         Prescription Medications as of 3/20/2017             tacrolimus (PROGRAF - GENERIC EQUIVALENT) 1 MG capsule Take 6 capsules (6 mg) by mouth every 12 hours    cephALEXin (KEFLEX) 500 MG capsule Take 1 capsule (500 mg) by mouth 4 times daily    furosemide (LASIX) 20 MG tablet Take 2 tablets (40 mg) by mouth daily    furosemide (LASIX) 20 MG tablet Take 4 tablets (80 mg) by mouth daily    cyclobenzaprine (FLEXERIL) 10 MG tablet Take 1 tablet (10 mg) by mouth 3 times daily as needed for muscle spasms    oxyCODONE (ROXICODONE) 10 MG IR tablet Take 1 tablet (10 mg) by mouth every 4 hours as needed for moderate to severe pain    lidocaine (LIDODERM) 5 % Patch Place 1 patch onto the skin every 24 hours     LACTOBACILLUS EXTRA STRENGTH CAPS Take 1 capsule by mouth 2 times daily    prochlorperazine (COMPAZINE) 5 MG tablet Take 1-2 tablets (5-10 mg) by mouth every 6 hours as needed for nausea or vomiting    cyclobenzaprine (FLEXERIL) 5 MG tablet Take 2 tablets (10 mg) by mouth 3 times daily as needed for muscle spasms    gabapentin (NEURONTIN) 300 MG capsule Take 1 capsule (300 mg) by mouth 3 times daily    oxyCODONE (ROXICODONE) 5 MG IR tablet Take 1-2 tablets (5-10 mg) by mouth every 4 hours as needed for moderate to severe pain    aspirin 325 MG tablet 1 tablet (325 mg) by Oral or Feeding Tube route daily    insulin aspart (NOVOLOG PEN) 100 UNIT/ML injection DOSE:  1 units per 8 grams of carbohydrate. With meals and snacks. Only chart total amount of units given.  Do not give if pre-prandial glucose is less than 60 mg/dL.    insulin glargine (LANTUS) 100 UNIT/ML injection Inject 45 Units Subcutaneous every 24 hours    valGANciclovir (VALCYTE) 450 MG tablet Take 1 tablet (450 mg) by mouth daily    mycophenolate (CELLCEPT - GENERIC EQUIVALENT) 250 MG capsule Take 4 capsules (1,000 mg) by mouth 2 times daily    senna-docusate (SENOKOT-S;PERICOLACE) 8.6-50 MG per tablet Take 2 tablets by mouth 2 times daily    sulfamethoxazole-trimethoprim (BACTRIM/SEPTRA) 400-80 MG per tablet Take 1 tablet by mouth daily    clotrimazole (MYCELEX) 10 MG LOZG lozenge Place 1 lozenge (1 Beatrice) inside cheek 3 times daily    multivitamin, therapeutic with minerals (THERA-VIT-M) TABS tablet Take 1 tablet by mouth daily    insulin aspart (NOVOLOG PEN) 100 UNIT/ML injection DOSE:  1 units per 10 grams of carbohydrate.  Only chart total amount of units given. HOLD CARB COVERAGE FOR  03/10/17 . ENDOCRINE TEAM WILL GIVE ALTERNATE ORDERS PAGER [id number]   Do not give if pre-prandial glucose is less than 60 mg/dL.    insulin aspart (NOVOLOG PEN) 100 UNIT/ML injection Inject 1-10 Units Subcutaneous 3 times daily (before meals) For Pre-Meal  - 189  give 1 unit.   For Pre-Meal  - 239 give 2 units.   For Pre-Meal  - 289 give 3 units.   For Pre-Meal  - 339 give 4 units.   For Pre-Meal -399 give 5 units.  For Pre-Meal -449 give 6 units.  For Pre-Meal BG = or > 450 give 7 units.    insulin aspart (NOVOLOG PEN) 100 UNIT/ML injection Inject 1-7 Units Subcutaneous At Bedtime Bedtime Blood Glucose (BG)  For  - 249 give 1 units.   For  - 299 give 2 units.   For  - 349 give 3 units.  For - 399 give 4 units.   For BG = or > 400 give 5 units.    gabapentin (NEURONTIN) 300 MG capsule Take 2 capsules (600 mg) by mouth At Bedtime    ondansetron (ZOFRAN-ODT) 4 MG ODT tab Take 1 tablet (4 mg) by mouth every 8 hours as needed for nausea    cyclobenzaprine (FLEXERIL) 10 MG tablet Take 1 tablet (10 mg) by mouth 3 times daily as needed for muscle spasms    omeprazole (PRILOSEC) 20 MG CR capsule Take 1 capsule (20 mg) by mouth 2 times daily (before meals)    glucagon 1 MG SOLR injection Inject 1 mg Subcutaneous every 15 minutes as needed for low blood sugar (May repeat x 1 only)    lactobacillus rhamnosus, GG, (CULTURELL) capsule Take 1 capsule by mouth 2 times daily      Facility Administered Medications as of 3/20/2017             albumin human 25 % injection 12.5 g Inject 50 mLs (12.5 g) into the vein 4 times daily as needed for other (12.5 grams per liter of ascitic fluid removed up to 50 grams)        Erythromycin and Vioxx   REVIEW OF SYSTEMS (check box if normal)  [x]               GENERAL  [x]                 PULMONARY [x]                GENITOURINARY  [x]                CNS                 [x]                 CARDIAC  [x]                 ENDOCRINE  [x]                EARS,NOSE,THROAT [x]                 GASTROINTESTINAL [x]                 NEUROLOGIC    [x]                MUSCLOSKELTAL  [x]                  HEMATOLOGY      PHYSICAL EXAM (check box if normal)/69 (BP Location: Right arm, Patient Position: Chair, Cuff  Size: Adult Regular)  Pulse 90  Temp 98.1  F (36.7  C) (Oral)  Resp 16  SpO2 98%      [x]            GENERAL:    [x]       EYES:  ICTERIC   []        YES  []                    NO  [x]           EXTREMITIES: Clubbing []       Y     [x]           N    [x]           EARS, NOSE, THROAT: Membranes Moist    YES   [x]                   NO []                  [x]           LUNGS:  CLEAR    YES       [x]                  NO    []                                [x]           SKIN: Jaundice           YES       []                  NO    [x]                   Rash: YES       []                  NO    [x]                                     [x]             HEART: Regular Rate          YES       [x]                  NO    []                   Incision Clean:  YES       [x]                  NO    []                                [x]                    ABDOMEN: Ascites present, wound mildly red Organomegaly YES       []                  NO    [x]                       [x]                    NEUROLOGICAL:  Nonfocal  YES       [x]                  NO    []                       [x]                    Hernia YES       []                  NO    [x]                   PSYCHIATRIC:  Appropriate  YES       [x]                  NO    []                       OTHER:                                                                                                   PAIN SCALE:: 3    Again, thank you for allowing me to participate in the care of your patient.      Sincerely,    Jovan Tran MD

## 2017-03-20 NOTE — TELEPHONE ENCOUNTER
Called and left message stating we needed Camacho to call and get an appointment slot that allowed for 30 minutes and his current only allows for 15. He is a patient who hasn't been seen since 2014 and is coming for a new issue of low back pain which would need to be 30 minutes. Left number for him to call.

## 2017-03-23 ENCOUNTER — TELEPHONE (OUTPATIENT)
Dept: TRANSPLANT | Facility: CLINIC | Age: 52
End: 2017-03-23

## 2017-03-23 ENCOUNTER — HOSPITAL ENCOUNTER (EMERGENCY)
Facility: CLINIC | Age: 52
Discharge: ANOTHER HOSPITAL | End: 2017-03-23
Attending: EMERGENCY MEDICINE | Admitting: EMERGENCY MEDICINE
Payer: COMMERCIAL

## 2017-03-23 ENCOUNTER — HOSPITAL ENCOUNTER (OUTPATIENT)
Dept: LAB | Facility: CLINIC | Age: 52
Discharge: HOME | End: 2017-03-23
Attending: TRANSPLANT SURGERY | Admitting: TRANSPLANT SURGERY
Payer: COMMERCIAL

## 2017-03-23 ENCOUNTER — HOSPITAL ENCOUNTER (INPATIENT)
Facility: CLINIC | Age: 52
LOS: 3 days | Discharge: HOME | DRG: 641 | End: 2017-03-26
Attending: TRANSPLANT SURGERY | Admitting: TRANSPLANT SURGERY
Payer: COMMERCIAL

## 2017-03-23 VITALS
SYSTOLIC BLOOD PRESSURE: 118 MMHG | DIASTOLIC BLOOD PRESSURE: 74 MMHG | RESPIRATION RATE: 20 BRPM | TEMPERATURE: 98.1 F | HEART RATE: 86 BPM | OXYGEN SATURATION: 100 %

## 2017-03-23 DIAGNOSIS — Z94.4 LIVER REPLACED BY TRANSPLANT (H): ICD-10-CM

## 2017-03-23 DIAGNOSIS — E87.5 HYPERKALEMIA: ICD-10-CM

## 2017-03-23 DIAGNOSIS — Z94.4 LIVER TRANSPLANT RECIPIENT (H): ICD-10-CM

## 2017-03-23 DIAGNOSIS — D84.9 IMMUNOSUPPRESSED STATUS (H): ICD-10-CM

## 2017-03-23 DIAGNOSIS — N17.9 ACUTE KIDNEY INJURY (H): ICD-10-CM

## 2017-03-23 LAB
ALBUMIN SERPL-MCNC: 2.3 G/DL (ref 3.4–5)
ALP SERPL-CCNC: 162 U/L (ref 40–150)
ALT SERPL W P-5'-P-CCNC: 19 U/L (ref 0–70)
ANION GAP SERPL CALCULATED.3IONS-SCNC: 8 MMOL/L (ref 3–14)
ANION GAP SERPL CALCULATED.3IONS-SCNC: 9 MMOL/L (ref 3–14)
ANION GAP SERPL CALCULATED.3IONS-SCNC: 9 MMOL/L (ref 3–14)
AST SERPL W P-5'-P-CCNC: 29 U/L (ref 0–45)
BASOPHILS # BLD AUTO: 0 10E9/L (ref 0–0.2)
BASOPHILS NFR BLD AUTO: 0.8 %
BILIRUB DIRECT SERPL-MCNC: 1.4 MG/DL (ref 0–0.2)
BILIRUB SERPL-MCNC: 1.5 MG/DL (ref 0.2–1.3)
BUN SERPL-MCNC: 46 MG/DL (ref 7–30)
BUN SERPL-MCNC: 47 MG/DL (ref 7–30)
BUN SERPL-MCNC: 48 MG/DL (ref 7–30)
CALCIUM SERPL-MCNC: 8.3 MG/DL (ref 8.5–10.1)
CALCIUM SERPL-MCNC: 8.6 MG/DL (ref 8.5–10.1)
CALCIUM SERPL-MCNC: 8.6 MG/DL (ref 8.5–10.1)
CHLORIDE SERPL-SCNC: 100 MMOL/L (ref 94–109)
CHLORIDE SERPL-SCNC: 101 MMOL/L (ref 94–109)
CHLORIDE SERPL-SCNC: 101 MMOL/L (ref 94–109)
CO2 SERPL-SCNC: 25 MMOL/L (ref 20–32)
CREAT SERPL-MCNC: 1.56 MG/DL (ref 0.66–1.25)
CREAT SERPL-MCNC: 1.63 MG/DL (ref 0.66–1.25)
CREAT SERPL-MCNC: 1.82 MG/DL (ref 0.66–1.25)
DIFFERENTIAL METHOD BLD: ABNORMAL
EOSINOPHIL # BLD AUTO: 0.1 10E9/L (ref 0–0.7)
EOSINOPHIL NFR BLD AUTO: 2.5 %
ERYTHROCYTE [DISTWIDTH] IN BLOOD BY AUTOMATED COUNT: 17.9 % (ref 10–15)
ERYTHROCYTE [DISTWIDTH] IN BLOOD BY AUTOMATED COUNT: 18 % (ref 10–15)
GFR SERPL CREATININE-BSD FRML MDRD: 39 ML/MIN/1.7M2
GFR SERPL CREATININE-BSD FRML MDRD: 45 ML/MIN/1.7M2
GFR SERPL CREATININE-BSD FRML MDRD: 47 ML/MIN/1.7M2
GLUCOSE BLDC GLUCOMTR-MCNC: 103 MG/DL (ref 70–99)
GLUCOSE BLDC GLUCOMTR-MCNC: 111 MG/DL (ref 70–99)
GLUCOSE BLDC GLUCOMTR-MCNC: 112 MG/DL (ref 70–99)
GLUCOSE BLDC GLUCOMTR-MCNC: 152 MG/DL (ref 70–99)
GLUCOSE BLDC GLUCOMTR-MCNC: 76 MG/DL (ref 70–99)
GLUCOSE SERPL-MCNC: 104 MG/DL (ref 70–99)
GLUCOSE SERPL-MCNC: 106 MG/DL (ref 70–99)
GLUCOSE SERPL-MCNC: 122 MG/DL (ref 70–99)
HCT VFR BLD AUTO: 22.4 % (ref 40–53)
HCT VFR BLD AUTO: 22.6 % (ref 40–53)
HGB BLD-MCNC: 7.3 G/DL (ref 13.3–17.7)
HGB BLD-MCNC: 7.4 G/DL (ref 13.3–17.7)
IMM GRANULOCYTES # BLD: 0.1 10E9/L (ref 0–0.4)
IMM GRANULOCYTES NFR BLD: 1.5 %
INTERPRETATION ECG - MUSE: NORMAL
LYMPHOCYTES # BLD AUTO: 0.3 10E9/L (ref 0.8–5.3)
LYMPHOCYTES NFR BLD AUTO: 5.2 %
MAGNESIUM SERPL-MCNC: 1.4 MG/DL (ref 1.6–2.3)
MAGNESIUM SERPL-MCNC: 1.7 MG/DL (ref 1.6–2.3)
MCH RBC QN AUTO: 31.2 PG (ref 26.5–33)
MCH RBC QN AUTO: 32.2 PG (ref 26.5–33)
MCHC RBC AUTO-ENTMCNC: 32.6 G/DL (ref 31.5–36.5)
MCHC RBC AUTO-ENTMCNC: 32.7 G/DL (ref 31.5–36.5)
MCV RBC AUTO: 95 FL (ref 78–100)
MCV RBC AUTO: 99 FL (ref 78–100)
MONOCYTES # BLD AUTO: 0.4 10E9/L (ref 0–1.3)
MONOCYTES NFR BLD AUTO: 7 %
NEUTROPHILS # BLD AUTO: 4.3 10E9/L (ref 1.6–8.3)
NEUTROPHILS NFR BLD AUTO: 83 %
NRBC # BLD AUTO: 0 10*3/UL
NRBC BLD AUTO-RTO: 0 /100
PHOSPHATE SERPL-MCNC: 4.8 MG/DL (ref 2.5–4.5)
PHOSPHATE SERPL-MCNC: 5.2 MG/DL (ref 2.5–4.5)
PLATELET # BLD AUTO: 157 10E9/L (ref 150–450)
PLATELET # BLD AUTO: 190 10E9/L (ref 150–450)
POTASSIUM SERPL-SCNC: 6.1 MMOL/L (ref 3.4–5.3)
POTASSIUM SERPL-SCNC: 6.5 MMOL/L (ref 3.4–5.3)
POTASSIUM SERPL-SCNC: 6.8 MMOL/L (ref 3.4–5.3)
POTASSIUM SERPL-SCNC: 6.8 MMOL/L (ref 3.4–5.3)
PROT SERPL-MCNC: 5.9 G/DL (ref 6.8–8.8)
RBC # BLD AUTO: 2.27 10E12/L (ref 4.4–5.9)
RBC # BLD AUTO: 2.37 10E12/L (ref 4.4–5.9)
SODIUM SERPL-SCNC: 134 MMOL/L (ref 133–144)
SODIUM SERPL-SCNC: 134 MMOL/L (ref 133–144)
SODIUM SERPL-SCNC: 135 MMOL/L (ref 133–144)
TACROLIMUS BLD-MCNC: 9.8 UG/L (ref 5–15)
TME LAST DOSE: NORMAL H
WBC # BLD AUTO: 5.2 10E9/L (ref 4–11)
WBC # BLD AUTO: 6.2 10E9/L (ref 4–11)

## 2017-03-23 PROCEDURE — 84132 ASSAY OF SERUM POTASSIUM: CPT | Performed by: STUDENT IN AN ORGANIZED HEALTH CARE EDUCATION/TRAINING PROGRAM

## 2017-03-23 PROCEDURE — 25000128 H RX IP 250 OP 636: Performed by: EMERGENCY MEDICINE

## 2017-03-23 PROCEDURE — 25900017 H RX MED GY IP 259 OP 259 PS 637: Performed by: STUDENT IN AN ORGANIZED HEALTH CARE EDUCATION/TRAINING PROGRAM

## 2017-03-23 PROCEDURE — 25800025 ZZH RX 258: Performed by: EMERGENCY MEDICINE

## 2017-03-23 PROCEDURE — 25000125 ZZHC RX 250: Performed by: STUDENT IN AN ORGANIZED HEALTH CARE EDUCATION/TRAINING PROGRAM

## 2017-03-23 PROCEDURE — 25000128 H RX IP 250 OP 636: Performed by: STUDENT IN AN ORGANIZED HEALTH CARE EDUCATION/TRAINING PROGRAM

## 2017-03-23 PROCEDURE — 96375 TX/PRO/DX INJ NEW DRUG ADDON: CPT

## 2017-03-23 PROCEDURE — 83735 ASSAY OF MAGNESIUM: CPT | Performed by: STUDENT IN AN ORGANIZED HEALTH CARE EDUCATION/TRAINING PROGRAM

## 2017-03-23 PROCEDURE — 99285 EMERGENCY DEPT VISIT HI MDM: CPT | Mod: 25

## 2017-03-23 PROCEDURE — 93005 ELECTROCARDIOGRAM TRACING: CPT

## 2017-03-23 PROCEDURE — 93010 ELECTROCARDIOGRAM REPORT: CPT | Mod: 77 | Performed by: INTERNAL MEDICINE

## 2017-03-23 PROCEDURE — 96366 THER/PROPH/DIAG IV INF ADDON: CPT

## 2017-03-23 PROCEDURE — 96365 THER/PROPH/DIAG IV INF INIT: CPT

## 2017-03-23 PROCEDURE — 25000132 ZZH RX MED GY IP 250 OP 250 PS 637: Performed by: STUDENT IN AN ORGANIZED HEALTH CARE EDUCATION/TRAINING PROGRAM

## 2017-03-23 PROCEDURE — 25000132 ZZH RX MED GY IP 250 OP 250 PS 637: Performed by: EMERGENCY MEDICINE

## 2017-03-23 PROCEDURE — 80048 BASIC METABOLIC PNL TOTAL CA: CPT | Performed by: STUDENT IN AN ORGANIZED HEALTH CARE EDUCATION/TRAINING PROGRAM

## 2017-03-23 PROCEDURE — 85025 COMPLETE CBC W/AUTO DIFF WBC: CPT | Performed by: STUDENT IN AN ORGANIZED HEALTH CARE EDUCATION/TRAINING PROGRAM

## 2017-03-23 PROCEDURE — 25000131 ZZH RX MED GY IP 250 OP 636 PS 637: Performed by: STUDENT IN AN ORGANIZED HEALTH CARE EDUCATION/TRAINING PROGRAM

## 2017-03-23 PROCEDURE — 00000146 ZZHCL STATISTIC GLUCOSE BY METER IP

## 2017-03-23 PROCEDURE — 25000125 ZZHC RX 250: Performed by: EMERGENCY MEDICINE

## 2017-03-23 PROCEDURE — 12000006 ZZH R&B IMCU INTERMEDIATE UMMC

## 2017-03-23 PROCEDURE — 84100 ASSAY OF PHOSPHORUS: CPT | Performed by: STUDENT IN AN ORGANIZED HEALTH CARE EDUCATION/TRAINING PROGRAM

## 2017-03-23 PROCEDURE — 80048 BASIC METABOLIC PNL TOTAL CA: CPT | Performed by: EMERGENCY MEDICINE

## 2017-03-23 PROCEDURE — 36415 COLL VENOUS BLD VENIPUNCTURE: CPT | Performed by: STUDENT IN AN ORGANIZED HEALTH CARE EDUCATION/TRAINING PROGRAM

## 2017-03-23 PROCEDURE — 25800025 ZZH RX 258: Performed by: STUDENT IN AN ORGANIZED HEALTH CARE EDUCATION/TRAINING PROGRAM

## 2017-03-23 RX ORDER — LACTOBACILLUS RHAMNOSUS GG 10B CELL
1 CAPSULE ORAL 2 TIMES DAILY
Status: DISCONTINUED | OUTPATIENT
Start: 2017-03-23 | End: 2017-03-26 | Stop reason: HOSPADM

## 2017-03-23 RX ORDER — DEXTROSE MONOHYDRATE 25 G/50ML
25-50 INJECTION, SOLUTION INTRAVENOUS
Status: DISCONTINUED | OUTPATIENT
Start: 2017-03-23 | End: 2017-03-26 | Stop reason: HOSPADM

## 2017-03-23 RX ORDER — SULFAMETHOXAZOLE AND TRIMETHOPRIM 400; 80 MG/1; MG/1
1 TABLET ORAL DAILY
Status: DISCONTINUED | OUTPATIENT
Start: 2017-03-24 | End: 2017-03-24

## 2017-03-23 RX ORDER — AMOXICILLIN 250 MG
2 CAPSULE ORAL 2 TIMES DAILY
Status: DISCONTINUED | OUTPATIENT
Start: 2017-03-23 | End: 2017-03-26 | Stop reason: HOSPADM

## 2017-03-23 RX ORDER — MEGESTROL ACETATE 40 MG/ML
400 SUSPENSION ORAL 2 TIMES DAILY
Status: DISCONTINUED | OUTPATIENT
Start: 2017-03-23 | End: 2017-03-26 | Stop reason: HOSPADM

## 2017-03-23 RX ORDER — LIDOCAINE 40 MG/G
CREAM TOPICAL
Status: DISCONTINUED | OUTPATIENT
Start: 2017-03-23 | End: 2017-03-26 | Stop reason: HOSPADM

## 2017-03-23 RX ORDER — LIDOCAINE 50 MG/G
1 PATCH TOPICAL EVERY 24 HOURS
Status: DISCONTINUED | OUTPATIENT
Start: 2017-03-24 | End: 2017-03-26 | Stop reason: HOSPADM

## 2017-03-23 RX ORDER — MYCOPHENOLATE MOFETIL 250 MG/1
1000 CAPSULE ORAL 2 TIMES DAILY
Status: DISCONTINUED | OUTPATIENT
Start: 2017-03-23 | End: 2017-03-26 | Stop reason: HOSPADM

## 2017-03-23 RX ORDER — ONDANSETRON 4 MG/1
4 TABLET, ORALLY DISINTEGRATING ORAL EVERY 8 HOURS PRN
Status: DISCONTINUED | OUTPATIENT
Start: 2017-03-23 | End: 2017-03-26 | Stop reason: HOSPADM

## 2017-03-23 RX ORDER — CYCLOBENZAPRINE HCL 10 MG
10 TABLET ORAL 3 TIMES DAILY PRN
Status: DISCONTINUED | OUTPATIENT
Start: 2017-03-23 | End: 2017-03-26 | Stop reason: HOSPADM

## 2017-03-23 RX ORDER — DEXTROSE MONOHYDRATE 25 G/50ML
25 INJECTION, SOLUTION INTRAVENOUS ONCE
Status: COMPLETED | OUTPATIENT
Start: 2017-03-23 | End: 2017-03-23

## 2017-03-23 RX ORDER — ASPIRIN 325 MG
325 TABLET ORAL DAILY
Status: DISCONTINUED | OUTPATIENT
Start: 2017-03-24 | End: 2017-03-26 | Stop reason: HOSPADM

## 2017-03-23 RX ORDER — DEXTROSE MONOHYDRATE 100 MG/ML
INJECTION, SOLUTION INTRAVENOUS CONTINUOUS
Status: ACTIVE | OUTPATIENT
Start: 2017-03-23 | End: 2017-03-24

## 2017-03-23 RX ORDER — OXYCODONE HYDROCHLORIDE 5 MG/1
5-10 TABLET ORAL EVERY 4 HOURS PRN
Status: DISCONTINUED | OUTPATIENT
Start: 2017-03-23 | End: 2017-03-26 | Stop reason: HOSPADM

## 2017-03-23 RX ORDER — NALOXONE HYDROCHLORIDE 0.4 MG/ML
.1-.4 INJECTION, SOLUTION INTRAMUSCULAR; INTRAVENOUS; SUBCUTANEOUS
Status: DISCONTINUED | OUTPATIENT
Start: 2017-03-23 | End: 2017-03-26 | Stop reason: HOSPADM

## 2017-03-23 RX ORDER — OXYCODONE HYDROCHLORIDE 5 MG/1
10 TABLET ORAL ONCE
Status: COMPLETED | OUTPATIENT
Start: 2017-03-23 | End: 2017-03-23

## 2017-03-23 RX ORDER — SODIUM POLYSTYRENE SULFONATE 15 G/60ML
30 SUSPENSION ORAL; RECTAL ONCE
Status: COMPLETED | OUTPATIENT
Start: 2017-03-23 | End: 2017-03-23

## 2017-03-23 RX ORDER — ACETAMINOPHEN 160 MG
1 TABLET,DISINTEGRATING ORAL 2 TIMES DAILY
Status: DISCONTINUED | OUTPATIENT
Start: 2017-03-23 | End: 2017-03-23

## 2017-03-23 RX ORDER — GABAPENTIN 300 MG/1
300 CAPSULE ORAL 3 TIMES DAILY
Status: DISCONTINUED | OUTPATIENT
Start: 2017-03-24 | End: 2017-03-26 | Stop reason: HOSPADM

## 2017-03-23 RX ORDER — VALGANCICLOVIR 450 MG/1
450 TABLET, FILM COATED ORAL DAILY
Status: DISCONTINUED | OUTPATIENT
Start: 2017-03-24 | End: 2017-03-26 | Stop reason: HOSPADM

## 2017-03-23 RX ORDER — MULTIPLE VITAMINS W/ MINERALS TAB 9MG-400MCG
1 TAB ORAL DAILY
Status: DISCONTINUED | OUTPATIENT
Start: 2017-03-24 | End: 2017-03-26 | Stop reason: HOSPADM

## 2017-03-23 RX ORDER — NICOTINE POLACRILEX 4 MG
15-30 LOZENGE BUCCAL
Status: DISCONTINUED | OUTPATIENT
Start: 2017-03-23 | End: 2017-03-26 | Stop reason: HOSPADM

## 2017-03-23 RX ADMIN — Medication 1 CAPSULE: at 21:46

## 2017-03-23 RX ADMIN — MEGESTROL ACETATE 400 MG: 40 SUSPENSION ORAL at 22:24

## 2017-03-23 RX ADMIN — HUMAN INSULIN 10 UNITS: 100 INJECTION, SOLUTION SUBCUTANEOUS at 22:26

## 2017-03-23 RX ADMIN — OXYCODONE HYDROCHLORIDE 10 MG: 5 TABLET ORAL at 21:46

## 2017-03-23 RX ADMIN — DEXTROSE MONOHYDRATE: 100 INJECTION, SOLUTION INTRAVENOUS at 22:27

## 2017-03-23 RX ADMIN — SODIUM BICARBONATE 50 MEQ: 84 INJECTION, SOLUTION INTRAVENOUS at 17:00

## 2017-03-23 RX ADMIN — CYCLOBENZAPRINE HYDROCHLORIDE 10 MG: 10 TABLET, FILM COATED ORAL at 21:52

## 2017-03-23 RX ADMIN — SODIUM BICARBONATE: 84 INJECTION, SOLUTION INTRAVENOUS at 21:44

## 2017-03-23 RX ADMIN — SODIUM CHLORIDE 500 ML: 9 INJECTION, SOLUTION INTRAVENOUS at 17:00

## 2017-03-23 RX ADMIN — DEXTROSE MONOHYDRATE 25 G: 25 INJECTION, SOLUTION INTRAVENOUS at 22:26

## 2017-03-23 RX ADMIN — HUMAN INSULIN 10 UNITS: 100 INJECTION, SOLUTION SUBCUTANEOUS at 17:05

## 2017-03-23 RX ADMIN — CALCIUM GLUCONATE 1 G: 94 INJECTION, SOLUTION INTRAVENOUS at 22:27

## 2017-03-23 RX ADMIN — MYCOPHENOLATE MOFETIL 1000 MG: 250 CAPSULE ORAL at 21:46

## 2017-03-23 RX ADMIN — OXYCODONE HYDROCHLORIDE 10 MG: 5 TABLET ORAL at 15:17

## 2017-03-23 RX ADMIN — TACROLIMUS 6 MG: 5 CAPSULE ORAL at 22:25

## 2017-03-23 RX ADMIN — CALCIUM GLUCONATE 1 G: 94 INJECTION, SOLUTION INTRAVENOUS at 16:33

## 2017-03-23 RX ADMIN — DEXTROSE MONOHYDRATE 25 G: 500 INJECTION PARENTERAL at 16:54

## 2017-03-23 RX ADMIN — SODIUM POLYSTYRENE SULFONATE 30 G: 15 SUSPENSION ORAL; RECTAL at 23:49

## 2017-03-23 ASSESSMENT — ACTIVITIES OF DAILY LIVING (ADL)
RETIRED_COMMUNICATION: 0-->UNDERSTANDS/COMMUNICATES WITHOUT DIFFICULTY
DRESS: 0-->INDEPENDENT
COMMUNICATION: 0-->UNDERSTANDS/COMMUNICATES WITHOUT DIFFICULTY
TRANSFERRING: 0-->INDEPENDENT
TRANSFERRING: 0-->INDEPENDENT
BATHING: 0-->INDEPENDENT
SWALLOWING: 0-->SWALLOWS FOODS/LIQUIDS WITHOUT DIFFICULTY
TOILETING: 0-->INDEPENDENT
TOILETING: 0-->INDEPENDENT
AMBULATION: 0-->INDEPENDENT
AMBULATION: 0-->INDEPENDENT
DRESS: 0-->INDEPENDENT
BATHING: 0-->INDEPENDENT
SWALLOWING: 0-->SWALLOWS FOODS/LIQUIDS WITHOUT DIFFICULTY
EATING: 0-->INDEPENDENT
RETIRED_EATING: 0-->INDEPENDENT
COGNITION: 0 - NO COGNITION ISSUES REPORTED
FALL_HISTORY_WITHIN_LAST_SIX_MONTHS: NO

## 2017-03-23 ASSESSMENT — ENCOUNTER SYMPTOMS: BACK PAIN: 1

## 2017-03-23 ASSESSMENT — PAIN DESCRIPTION - DESCRIPTORS: DESCRIPTORS: ACHING;CONSTANT

## 2017-03-23 NOTE — IP AVS SNAPSHOT
MRN:[id number]                      After Visit Summary   3/23/2017    Camacho Bhagat    MRN: [id number]           Thank you!     Thank you for choosing [facility] for your care. Our goal is always to provide you with excellent care. Hearing back from our patients is one way we can continue to improve our services. Please take a few minutes to complete the written survey that you may receive in the mail after you visit with us. Thank you!        Patient Information     Date Of Birth          1965        About your hospital stay     You were admitted on:  March 23, 2017 You last received care in the:  [address] [facility]    You were discharged on:  March 26, 2017        Reason for your hospital stay       Treatment of hyperkalemia                  Who to Call     For medical emergencies, please call 911.  For non-urgent questions about your medical care, please call your primary care provider or clinic, [phone]          Attending Provider     Provider Specialty    Adonay Cross MD Student in organized health care education/training program    Tip Zhang MD Transplant       Primary Care Provider Office Phone # Fax #    Shay Kirkpatrick [phone]        [facility] [address] [facility] [address]  [facility] [address]        After Care Instructions     Activity       Your activity upon discharge: activity as tolerated            Diet       Low potassium            Discharge Instructions                 Follow-up Appointments     Follow Up ([facility])       Follow up with Dr. Tran , at [facility] within 1 week.  to evaluate after admission.     Appointments on [facility] and/or [facility] (with [facility] or [facility] provider or service). Call [phone] if you haven't heard regarding these appointments within 7 days of discharge.                  Your next 10 appointments already scheduled     Mar 28, 2017 11:00 AM CDT   Evaluation with Leela HANKINS  Venecia, PT   [facility] ([facility])    [address]  [facility] [id number] [phone]            Apr 03, 2017 11:00 AM CDT   LAB with  LAB   [facility]    [address] [place] [id number] [phone]           Patient must bring picture ID.  Patient should be prepared to give a urine specimen  Please do not eat 10-12 hours before your appointment if you are coming in fasting for labs on lipids, cholesterol, or glucose (sugar).  Pregnant women should follow their Care Team instructions. Water with medications is okay. Do not drink coffee or other fluids.   If you have concerns about taking  your medications, please ask at office or if scheduling via [employer], send a message by clicking on Secure Messaging, Message Your Care Team.            Apr 03, 2017 11:50 AM CDT   (Arrive by 11:35 AM)   Liver Return Post Op with Jovan Tran MD   [facility] Solid Organ Transplant ([facility])    [address] [place] [id number] [phone]            Apr 24, 2017  1:00 PM CDT   (Arrive by 12:45 PM)   Return General Liver with Toñito Camejo MD   [facility] Hepatology ([facility])    [address] [place] [id number] [phone]              Further instructions from your care team       Activity  Resume light activities around your home as soon as possible.   Don t lift anything heavier than 10 pounds until your doctor says it s okay.   Limit sports and strenuous activities for 6 weeks.    Patient has been instructed to avoid NSAIDs as these medications may affect the remaining kidney. Take other medications as prescribed.  May shower. Gently wash around your incisions with soap and water.   Don t bathe or soak in a tub until your incisions are well healed.   Wear loose-fitting clothes. This will  help you be more comfortable and cause less irritation around your incisions.  Don t drive until you are no longer taking prescription pain medication.    Diet  Low potassium diet   Keep yourself well hydrated.   If you are constipated, take a fiber laxative such as Metamucil.      Notify:  Call your transplant coordinator immediately if you have any of the following:   Swelling, oozing, worsening pain, or unusual redness around the incision   Fever of 100.5 F or higher   Increasing abdominal pain   Severe diarrhea, bloating, or constipation   Nausea or vomiting         Pending Results     No orders found from 3/21/2017 to 3/24/2017.            Statement of Approval     Ordered          03/26/17 1413  I have reviewed and agree with all the recommendations and orders detailed in this document.  EFFECTIVE NOW     Approved and electronically signed by:  Adonay Cross MD             Admission Information     Date & Time Provider Department Dept. Phone    3/23/2017 Tip Zhang MD [address] [facility] [phone]      Your Vitals Were     Blood Pressure Temperature Respirations Height Weight Pulse Oximetry    158/97 (BP Location: Right arm) 97.5  F (36.4  C) (Oral) 16 1.829 m (6') 107.2 kg (236 lb 5.3 oz) 94%    BMI (Body Mass Index)                   32.05 kg/m2           [employer] Information     [employer] gives you secure access to your electronic health record. If you see a primary care provider, you can also send messages to your care team and make appointments. If you have questions, please call your primary care clinic.  If you do not have a primary care provider, please call [phone] and they will assist you.        Care EveryWhere ID     This is your Care EveryWhere ID. This could be used by other organizations to access your [place] medical records  [id number]           Review of your medicines      START taking        Dose / Directions    magnesium oxide 400 MG tablet   Commonly known  as:  MAG-OX   Used for:  Liver replaced by transplant (H)   Notes to Patient:  You should NOT take this at the same time as your Cellcept.   Wait at least 1-2 hours AFTER your cellcept to take.         Dose:  400 mg   Take 1 tablet (400 mg) by mouth 2 times daily   Quantity:  7 tablet   Refills:  1         CONTINUE these medicines which may have CHANGED, or have new prescriptions. If we are uncertain of the size of tablets/capsules you have at home, strength may be listed as something that might have changed.        Dose / Directions    furosemide 20 MG tablet   Commonly known as:  LASIX   This may have changed:  how much to take   Used for:  Liver replaced by transplant (H)   Notes to Patient:  Remember, this is a lower dose than you were on before.         Dose:  40 mg   Take 2 tablets (40 mg) by mouth daily   Quantity:  30 tablet   Refills:  3       gabapentin 300 MG capsule   Commonly known as:  NEURONTIN   This may have changed:  Another medication with the same name was removed. Continue taking this medication, and follow the directions you see here.   Used for:  Low back pain at multiple sites   Notes to Patient:  Remember, you are no longer taking that large dose (600 mg) at bedtime. You're doing the 300 mg three times a day.         Dose:  300 mg   Take 1 capsule (300 mg) by mouth 3 times daily   Quantity:  90 capsule   Refills:  1         CONTINUE these medicines which have NOT CHANGED        Dose / Directions    aspirin 325 MG tablet   Used for:  Liver transplant recipient (H)        Dose:  325 mg   1 tablet (325 mg) by Oral or Feeding Tube route daily   Quantity:  180 tablet   Refills:  4       clotrimazole 10 MG Lozg lozenge   Commonly known as:  MYCELEX   Used for:  Liver transplant recipient (H)        Dose:  1 Beatrice   Place 1 lozenge (1 Beatrice) inside cheek 3 times daily   Quantity:  70 each   Refills:  0       cyclobenzaprine 10 MG tablet   Commonly known as:  FLEXERIL   Used for:   Immunosuppression (H)        Dose:  10 mg   Take 1 tablet (10 mg) by mouth 3 times daily as needed for muscle spasms   Quantity:  90 tablet   Refills:  1       glucagon 1 MG Solr injection   Used for:  Hypoglycemia        Dose:  1 mg   Inject 1 mg Subcutaneous every 15 minutes as needed for low blood sugar (May repeat x 1 only)   Quantity:  1 each   Refills:  0       * insulin aspart 100 UNIT/ML injection   Commonly known as:  NovoLOG PEN   Used for:  Liver transplant recipient (H)        DOSE:  1 units per 8 grams of carbohydrate. With meals and snacks. Only chart total amount of units given.  Do not give if pre-prandial glucose is less than 60 mg/dL.   Quantity:  3 mL   Refills:  3       * insulin aspart 100 UNIT/ML injection   Commonly known as:  NovoLOG PEN   Used for:  Liver transplant recipient (H)        Dose:  1-10 Units   Inject 1-10 Units Subcutaneous 3 times daily (before meals) For Pre-Meal  - 189 give 1 unit.  For Pre-Meal  - 239 give 2 units.  For Pre-Meal  - 289 give 3 units.  For Pre-Meal  - 339 give 4 units.  For Pre-Meal -399 give 5 units. For Pre-Meal -449 give 6 units. For Pre-Meal BG = or > 450 give 7 units.   Quantity:  3 mL   Refills:  3       * insulin aspart 100 UNIT/ML injection   Commonly known as:  NovoLOG PEN   Used for:  Liver transplant recipient (H)        Dose:  1-7 Units   Inject 1-7 Units Subcutaneous At Bedtime Bedtime Blood Glucose (BG) For  - 249 give 1 units.  For  - 299 give 2 units.  For  - 349 give 3 units. For - 399 give 4 units.  For BG = or > 400 give 5 units.   Quantity:  3 mL   Refills:  3       insulin glargine 100 UNIT/ML injection   Commonly known as:  LANTUS   Used for:  Liver transplant recipient (H)        Dose:  45 Units   Inject 45 Units Subcutaneous every 24 hours   Quantity:  3 mL   Refills:  3       LACTOBACILLUS EXTRA STRENGTH Caps   Used for:  Aftercare following organ transplant, Status post liver  "transplantation (H)   Notes to Patient:  This one is to promote a healthy gut. Restores the good bacteria in your stomach.         Dose:  1 capsule   Take 1 capsule by mouth 2 times daily   Quantity:  30 capsule   Refills:  1       lidocaine 5 % Patch   Commonly known as:  LIDODERM   Used for:  Low back pain at multiple sites   Notes to Patient:  You can only have a patch on for 12 hours at a time.  You then must have the patch OFF for 12 hours before you can apply another.         Dose:  1 patch   Place 1 patch onto the skin every 24 hours   Quantity:  30 patch   Refills:  1       megestrol 40 MG/ML suspension   Commonly known as:  MEGACE   Used for:  Liver replaced by transplant (H)   Notes to Patient:  This one stimulates your appetite!        Dose:  400 mg   Take 10 mLs (400 mg) by mouth 2 times daily   Quantity:  600 mL   Refills:  3       multivitamin, therapeutic with minerals Tabs tablet   Used for:  Liver transplant recipient (H)        Dose:  1 tablet   Take 1 tablet by mouth daily   Quantity:  30 each   Refills:  11       mycophenolate 250 MG capsule   Commonly known as:  CELLCEPT - GENERIC EQUIVALENT   Used for:  Liver transplant recipient (H)   Notes to Patient:  This and your Prograf are your \"anti-rejection meds\"          Dose:  1000 mg   Take 4 capsules (1,000 mg) by mouth 2 times daily   Quantity:  240 capsule   Refills:  11       omeprazole 20 MG CR capsule   Commonly known as:  priLOSEC   Used for:  Gastroesophageal reflux disease without esophagitis        Dose:  20 mg   Take 1 capsule (20 mg) by mouth 2 times daily (before meals)   Quantity:  60 capsule   Refills:  0       ondansetron 4 MG ODT tab   Commonly known as:  ZOFRAN-ODT   Used for:  Cirrhosis of liver with ascites, unspecified hepatic cirrhosis type (H), Nausea        Dose:  4 mg   Take 1 tablet (4 mg) by mouth every 8 hours as needed for nausea   Quantity:  30 tablet   Refills:  0       oxyCODONE 5 MG IR tablet   Commonly known as:  " "ROXICODONE   Used for:  Liver transplant recipient (H)        Dose:  5-10 mg   Take 1-2 tablets (5-10 mg) by mouth every 4 hours as needed for moderate to severe pain   Quantity:  40 tablet   Refills:  0       prochlorperazine 5 MG tablet   Commonly known as:  COMPAZINE   Used for:  Aftercare following organ transplant, Status post liver transplantation (H)        Dose:  5-10 mg   Take 1-2 tablets (5-10 mg) by mouth every 6 hours as needed for nausea or vomiting   Quantity:  90 tablet   Refills:  0       senna-docusate 8.6-50 MG per tablet   Commonly known as:  SENOKOT-S;PERICOLACE   Used for:  Liver transplant recipient (H)        Dose:  2 tablet   Take 2 tablets by mouth 2 times daily   Quantity:  100 tablet   Refills:  1       sulfamethoxazole-trimethoprim 400-80 MG per tablet   Commonly known as:  BACTRIM/SEPTRA   Indication:  PCP prophylaxis   Used for:  Liver transplant recipient (H)        Dose:  1 tablet   Take 1 tablet by mouth daily   Quantity:  30 tablet   Refills:  5       tacrolimus 1 MG capsule   Commonly known as:  PROGRAF - GENERIC EQUIVALENT   Used for:  Liver transplant recipient (H)   Notes to Patient:  This and your Cellcept are your \"anti-rejection meds\"        Dose:  6 mg   Take 6 capsules (6 mg) by mouth every 12 hours   Quantity:  360 capsule   Refills:  11       valGANciclovir 450 MG tablet   Commonly known as:  VALCYTE   Indication:  Treatment to Prevent Cytomegalovirus Disease   Used for:  Liver transplant recipient (H)   Notes to Patient:  Remember, valgancicloVIR == VIR = think VIRus --> this is an \"anti-viral\"        Dose:  450 mg   Take 1 tablet (450 mg) by mouth daily   Quantity:  60 tablet   Refills:  5       * Notice:  This list has 3 medication(s) that are the same as other medications prescribed for you. Read the directions carefully, and ask your doctor or other care provider to review them with you.      STOP taking     cephALEXin 500 MG capsule   Commonly known as:  KEFLEX     "            Where to get your medicines      Some of these will need a paper prescription and others can be bought over the counter. Ask your nurse if you have questions.     Bring a paper prescription for each of these medications     furosemide 20 MG tablet    magnesium oxide 400 MG tablet                Protect others around you: Learn how to safely use, store and throw away your medicines at www.disposemymeds.org.             Medication List: This is a list of all your medications and when to take them. Check marks below indicate your daily home schedule. Keep this list as a reference.      Medications           Morning Afternoon Evening Bedtime As Needed    aspirin 325 MG tablet   1 tablet (325 mg) by Oral or Feeding Tube route daily   Last time this was given:  325 mg on 3/26/2017  8:23 AM                                clotrimazole 10 MG Lozg lozenge   Commonly known as:  MYCELEX   Place 1 lozenge (1 Beatrice) inside cheek 3 times daily   Last time this was given:  1 Beatrice on 3/26/2017  2:14 PM                                cyclobenzaprine 10 MG tablet   Commonly known as:  FLEXERIL   Take 1 tablet (10 mg) by mouth 3 times daily as needed for muscle spasms   Last time this was given:  10 mg on 3/26/2017 11:44 AM                                furosemide 20 MG tablet   Commonly known as:  LASIX   Take 2 tablets (40 mg) by mouth daily   Notes to Patient:  Remember, this is a lower dose than you were on before.                                 gabapentin 300 MG capsule   Commonly known as:  NEURONTIN   Take 1 capsule (300 mg) by mouth 3 times daily   Last time this was given:  300 mg on 3/26/2017  2:14 PM   Notes to Patient:  Remember, you are no longer taking that large dose (600 mg) at bedtime. You're doing the 300 mg three times a day.                                 glucagon 1 MG Solr injection   Inject 1 mg Subcutaneous every 15 minutes as needed for low blood sugar (May repeat x 1 only)                                 * insulin aspart 100 UNIT/ML injection   Commonly known as:  NovoLOG PEN   DOSE:  1 units per 8 grams of carbohydrate. With meals and snacks. Only chart total amount of units given.  Do not give if pre-prandial glucose is less than 60 mg/dL.   Last time this was given:  2 Units on 3/26/2017 12:05 PM                                * insulin aspart 100 UNIT/ML injection   Commonly known as:  NovoLOG PEN   Inject 1-10 Units Subcutaneous 3 times daily (before meals) For Pre-Meal  - 189 give 1 unit.  For Pre-Meal  - 239 give 2 units.  For Pre-Meal  - 289 give 3 units.  For Pre-Meal  - 339 give 4 units.  For Pre-Meal -399 give 5 units. For Pre-Meal -449 give 6 units. For Pre-Meal BG = or > 450 give 7 units.   Last time this was given:  2 Units on 3/26/2017 12:05 PM                                * insulin aspart 100 UNIT/ML injection   Commonly known as:  NovoLOG PEN   Inject 1-7 Units Subcutaneous At Bedtime Bedtime Blood Glucose (BG) For  - 249 give 1 units.  For  - 299 give 2 units.  For  - 349 give 3 units. For - 399 give 4 units.  For BG = or > 400 give 5 units.   Last time this was given:  2 Units on 3/26/2017 12:05 PM                                insulin glargine 100 UNIT/ML injection   Commonly known as:  LANTUS   Inject 45 Units Subcutaneous every 24 hours   Last time this was given:  45 Units on 3/26/2017  8:39 AM                                LACTOBACILLUS EXTRA STRENGTH Caps   Take 1 capsule by mouth 2 times daily   Last time this was given:  3/26 0830am   Notes to Patient:  This one is to promote a healthy gut. Restores the good bacteria in your stomach.                                 lidocaine 5 % Patch   Commonly known as:  LIDODERM   Place 1 patch onto the skin every 24 hours   Last time this was given:  1 patch on 3/26/2017  8:43 AM   Notes to Patient:  You can only have a patch on for 12 hours at a time.  You then must have the  "patch OFF for 12 hours before you can apply another.                                 magnesium oxide 400 MG tablet   Commonly known as:  MAG-OX   Take 1 tablet (400 mg) by mouth 2 times daily   Last time this was given:  400 mg on 3/26/2017 11:32 AM   Notes to Patient:  You should NOT take this at the same time as your Cellcept.   Wait at least 1-2 hours AFTER your cellcept to take.                                 megestrol 40 MG/ML suspension   Commonly known as:  MEGACE   Take 10 mLs (400 mg) by mouth 2 times daily   Last time this was given:  400 mg on 3/26/2017  8:25 AM   Notes to Patient:  This one stimulates your appetite!                                multivitamin, therapeutic with minerals Tabs tablet   Take 1 tablet by mouth daily   Last time this was given:  1 tablet on 3/26/2017 11:32 AM                                mycophenolate 250 MG capsule   Commonly known as:  CELLCEPT - GENERIC EQUIVALENT   Take 4 capsules (1,000 mg) by mouth 2 times daily   Last time this was given:  1,000 mg on 3/26/2017  8:24 AM   Notes to Patient:  This and your Prograf are your \"anti-rejection meds\"                                  omeprazole 20 MG CR capsule   Commonly known as:  priLOSEC   Take 1 capsule (20 mg) by mouth 2 times daily (before meals)   Last time this was given:  20 mg on 3/26/2017  8:22 AM                                ondansetron 4 MG ODT tab   Commonly known as:  ZOFRAN-ODT   Take 1 tablet (4 mg) by mouth every 8 hours as needed for nausea                                oxyCODONE 5 MG IR tablet   Commonly known as:  ROXICODONE   Take 1-2 tablets (5-10 mg) by mouth every 4 hours as needed for moderate to severe pain   Last time this was given:  10 mg on 3/26/2017 11:44 AM                                prochlorperazine 5 MG tablet   Commonly known as:  COMPAZINE   Take 1-2 tablets (5-10 mg) by mouth every 6 hours as needed for nausea or vomiting                                senna-docusate 8.6-50 MG per " "tablet   Commonly known as:  SENOKOT-S;PERICOLACE   Take 2 tablets by mouth 2 times daily   Last time this was given:  2 tablets on 3/25/2017  7:39 PM                                sulfamethoxazole-trimethoprim 400-80 MG per tablet   Commonly known as:  BACTRIM/SEPTRA   Take 1 tablet by mouth daily   Last time this was given:  1 tablet on 3/24/2017  9:40 AM                                tacrolimus 1 MG capsule   Commonly known as:  PROGRAF - GENERIC EQUIVALENT   Take 6 capsules (6 mg) by mouth every 12 hours   Last time this was given:  6 mg on 3/26/2017  8:24 AM   Notes to Patient:  This and your Cellcept are your \"anti-rejection meds\"                                valGANciclovir 450 MG tablet   Commonly known as:  VALCYTE   Take 1 tablet (450 mg) by mouth daily   Last time this was given:  450 mg on 3/26/2017  8:23 AM   Notes to Patient:  Remember, valgancicloVIR == VIR = think VIRus --> this is an \"anti-viral\"                                * Notice:  This list has 3 medication(s) that are the same as other medications prescribed for you. Read the directions carefully, and ask your doctor or other care provider to review them with you.      "

## 2017-03-23 NOTE — IP AVS SNAPSHOT
[address] [address]    Phone:  [phone]                                       After Visit Summary   3/23/2017    Camacho Bhagat    MRN: [id number]           After Visit Summary Signature Page     I have received my discharge instructions, and my questions have been answered. I have discussed any challenges I see with this plan with the nurse or doctor.    ..........................................................................................................................................  Patient/Patient Representative Signature      ..........................................................................................................................................  Patient Representative Print Name and Relationship to Patient    ..................................................               ................................................  Date                                            Time    ..........................................................................................................................................  Reviewed by Signature/Title    ...................................................              ..............................................  Date                                                            Time

## 2017-03-23 NOTE — TELEPHONE ENCOUNTER
Per lab, pt's potassium elevated to 6.8 , labs done at 11am.  Reviewed with , plan for pt to go into ER, [facility]   Phone calls and message left for pt, his mother Humaira and his wife Danica,  Advising that he go into the ER room right away to be treated for potassium of 6.8

## 2017-03-23 NOTE — ED NOTES
In Triage: ABC's intact. Alert and oriented x 3.  Pt had labs drawn this morning. Potassium is over 6 so sent here. Had liver transplant on march 2nd.

## 2017-03-23 NOTE — TELEPHONE ENCOUNTER
DATE:  3/23/2017   TIME OF RECEIPT FROM LAB:  12:29 PM  LAB TEST:  Potassium  LAB VALUE:  6.8  RESULTS GIVEN WITH READ-BACK TO (PROVIDER):  Abril Doty RN  TIME LAB VALUE REPORTED TO PROVIDER:   12:30 PM

## 2017-03-23 NOTE — TELEPHONE ENCOUNTER
"Spoke with Camacho about elevated potassium, he states that his\" potassium is elevated because his dose of lasix was changed\".   Pt initially uncertain if he wants to go into the ER  (unclear if he understands), but agrees to go into [facility] ER,  His mom Humaira will bring him.    "

## 2017-03-23 NOTE — ED PROVIDER NOTES
History     Chief Complaint:  Hyperkalemia     HPI   Camacho Bhagat is a 51 year old male with non-alcoholic liver cirrhosis and HCC, s/p liver transplant on 3/4 at the  (20 days ago) with Dr. Cross, who presents for hyperkalemia of 6.8 on laboratory workup this morning. The patient has been having routine workup after the surgery. patient complains of mid-back pain that increases with movement since the surgery, but has no complaints otherwise. The patient is on furosemide 40 mg bid and is not taking spironolactone.     Previous laboratory results:  3/20: K 5.9  3/16: K 5.9  3/13: K 5.8  3/12: K 5.7    Allergies:  Erythromycin   Vioxx     Medications:    Gabapentin  Oxycodone   Aspirin 325 mg   Insulin aspart   Insulin glargine   Valganciclovir   Mycophenolate   Senna-docusate   Sulfamethoxazole-trimethoprim   Clotrimazole   Multivitamin  Insulin aspart   Ondansetron   Cyclobenzaprine  Omeprazole   Glucagon  Lactobacillus     Past Medical History:    Acute venous embolism and thrombophlebitis   Non-alcoholic hepatic cirrhosis  HCC - 2016  EJ - 03/10/2017  Immunosuppressed status (s/p liver transplant) - 03/10/2017  Hyperkalemia - 2017  Hypoglycemia  - 2016  Hepatic encephalopathy - 2016  Catheter associated UTI - 2016  Hyperlipidemia  HTN  CVA    Depressive disorder  Esophageal reflux   Fibromyalgia  OA   Testicular hypofunction   Pneumonia  RA  Sjogren s syndrome   IDDM   HTN  Sleep apnea, uses BiPAP  Scrotal cellulitis     Past Surgical History:    Liver transplant from  donor, 3/4/2017, [facility], Dr. Cross, Fellow in transplant surgery   Tracheostomy  Deviated septum repair  R knee arthroscopy  X 2  Cholecystectomy    Family History:    DM  HTN  Substance abuse  Arthritis     Social History:  Marital Status:   [2]  Smoking status: former, quit   Alcohol status: quit   Patient presents with his wife.  PCP: Shay Kirkpatrick      Review of Systems    Musculoskeletal: Positive for back pain.   All other systems reviewed and are negative.      Physical Exam     Patient Vitals for the past 24 hrs:   BP Temp Temp src Pulse Heart Rate Resp SpO2   03/23/17 1500 141/83 - - - 81 - 99 %   03/23/17 1445 131/82 - - - 79 - 100 %   03/23/17 1425 157/86 98.1  F (36.7  C) Oral 86 - 20 97 %         Physical Exam  Constitutional: Alert, attentive  HENT:    Nose: Nose normal.    Mouth/Throat: Oropharynx is clear, mucous membranes are moist   Eyes: EOM are normal. Pupils are equal, round, and reactive to light.   CV: regular rate and rhythm; no murmurs, rubs or gallups  Chest: Effort normal and breath sounds normal.   GI:  There is no tenderness. No distension. Normal bowel sounds   Well healing RUQ incision, no erythema or drainage; slight fluid wave present  MSK: Normal range of motion.   Neurological: Alert, attentive  Skin: Skin is warm and dry.      Emergency Department Course     ECG (14:37:15):  Indication: hyperkalemia.   Rate 83 bpm. NM interval 172 ms. QRS duration 94 ms. QT/QTc 372/437 ms. R axis -52.   Interpretation: normal sinus rhythm. LAFB. Abnormal ECG.   Agree with computer interpretation.   No changes from previous ECG dated 3/3/2017.  Interpreted at 1434 by Toñito Tony MD.     Laboratory:  Laboratory findings were communicated with the patient who voiced understanding of the findings.  BMP: K 6.5, Ca 8.3, Creatinine 1.63, GFR 45     Interventions:  1517: Oxycodone 10 mg, PO   1633: Calcium gluconate 1 g in D5W 100 ml, IV    D50 25 g IV, insulin 10 units IV, sodium bicarbonate 50 mEq    Emergency Department Course:  Nursing notes and vitals reviewed.  I performed an exam of the patient as documented above.   The patient was placed on continuous pulse oximetry and cardiac monitoring.   A peripheral IV was established and blood was drawn for laboratory testing, results above.    At 1600, I discussed the patient with Dr. Cross, surgeon responsible for  his liver transplant, the patient will be transferred there for further care.    I personally reviewed the findings with the patient and answered all related questions prior to transfer.    Impression & Plan      Medical Decision Making:  This is a 51 year old male with a recent liver transplant at the [facility] with also chronic kidney disease and intermittent hyperkalemia since surgery. He is currently on lasix for currently of this as well as his mild ascites. Today's rechecked BMP showed hyperkalemia, but he remains asymptomatic. Here his ECG is unremarkable, laboratory shows the above. I discussed this with fellow Dr. Cross. Plan on transfer to [facility]. Follow-up as scheduled. Strict return precautions for fever, vomiting, pain or any other concerns.     Diagnosis:    ICD-10-CM    1. Hyperkalemia E87.5    2. Liver transplant recipient (H) Z94.4    3. Acute kidney injury (H) N17.9    4. Immunosuppressed status (H) D89.9        Disposition:   Transfer to [facility]    Scribe Disclosure:  I, Helena Brown, am serving as a scribe at 2:29 PM on 3/23/2017 to document services personally performed by Toñito Tony MD, based on my observations and the provider's statements to me.   [facility] EMERGENCY DEPARTMENT       Toñito Tony MD  03/23/17 2115

## 2017-03-24 ENCOUNTER — APPOINTMENT (OUTPATIENT)
Dept: ULTRASOUND IMAGING | Facility: CLINIC | Age: 52
DRG: 641 | End: 2017-03-24
Attending: PHYSICIAN ASSISTANT
Payer: COMMERCIAL

## 2017-03-24 ENCOUNTER — APPOINTMENT (OUTPATIENT)
Dept: ULTRASOUND IMAGING | Facility: CLINIC | Age: 52
DRG: 641 | End: 2017-03-24
Payer: COMMERCIAL

## 2017-03-24 LAB
ALBUMIN SERPL-MCNC: 2 G/DL (ref 3.4–5)
ALBUMIN UR-MCNC: NEGATIVE MG/DL
ALP SERPL-CCNC: 144 U/L (ref 40–150)
ALT SERPL W P-5'-P-CCNC: 15 U/L (ref 0–70)
ANION GAP SERPL CALCULATED.3IONS-SCNC: 8 MMOL/L (ref 3–14)
APPEARANCE UR: CLEAR
AST SERPL W P-5'-P-CCNC: 19 U/L (ref 0–45)
BASOPHILS # BLD AUTO: 0 10E9/L (ref 0–0.2)
BASOPHILS NFR BLD AUTO: 0.5 %
BILIRUB DIRECT SERPL-MCNC: 1.1 MG/DL (ref 0–0.2)
BILIRUB SERPL-MCNC: 1.7 MG/DL (ref 0.2–1.3)
BILIRUB UR QL STRIP: NEGATIVE
BLD PROD TYP BPU: NORMAL
BLD UNIT ID BPU: 0
BLOOD PRODUCT CODE: NORMAL
BPU ID: NORMAL
BUN SERPL-MCNC: 44 MG/DL (ref 7–30)
CALCIUM SERPL-MCNC: 8.4 MG/DL (ref 8.5–10.1)
CHLORIDE SERPL-SCNC: 99 MMOL/L (ref 94–109)
CO2 SERPL-SCNC: 27 MMOL/L (ref 20–32)
COLOR UR AUTO: YELLOW
CREAT SERPL-MCNC: 1.58 MG/DL (ref 0.66–1.25)
DIFFERENTIAL METHOD BLD: ABNORMAL
EOSINOPHIL # BLD AUTO: 0.1 10E9/L (ref 0–0.7)
EOSINOPHIL NFR BLD AUTO: 2.2 %
ERYTHROCYTE [DISTWIDTH] IN BLOOD BY AUTOMATED COUNT: 17.7 % (ref 10–15)
GFR SERPL CREATININE-BSD FRML MDRD: 46 ML/MIN/1.7M2
GLUCOSE SERPL-MCNC: 147 MG/DL (ref 70–99)
GLUCOSE UR STRIP-MCNC: NEGATIVE MG/DL
HCT VFR BLD AUTO: 20.6 % (ref 40–53)
HGB BLD-MCNC: 6.6 G/DL (ref 13.3–17.7)
HGB UR QL STRIP: NEGATIVE
HYALINE CASTS #/AREA URNS LPF: 1 /LPF (ref 0–2)
IMM GRANULOCYTES # BLD: 0 10E9/L (ref 0–0.4)
IMM GRANULOCYTES NFR BLD: 0.7 %
INTERPRETATION ECG - MUSE: NORMAL
KETONES UR STRIP-MCNC: NEGATIVE MG/DL
LEUKOCYTE ESTERASE UR QL STRIP: NEGATIVE
LYMPHOCYTES # BLD AUTO: 0.4 10E9/L (ref 0.8–5.3)
LYMPHOCYTES NFR BLD AUTO: 9 %
MAGNESIUM SERPL-MCNC: 1.5 MG/DL (ref 1.6–2.3)
MCH RBC QN AUTO: 30.7 PG (ref 26.5–33)
MCHC RBC AUTO-ENTMCNC: 32 G/DL (ref 31.5–36.5)
MCV RBC AUTO: 96 FL (ref 78–100)
MONOCYTES # BLD AUTO: 0.1 10E9/L (ref 0–1.3)
MONOCYTES NFR BLD AUTO: 3.2 %
NEUTROPHILS # BLD AUTO: 3.5 10E9/L (ref 1.6–8.3)
NEUTROPHILS NFR BLD AUTO: 84.4 %
NITRATE UR QL: NEGATIVE
NRBC # BLD AUTO: 0 10*3/UL
NRBC BLD AUTO-RTO: 0 /100
PH UR STRIP: 5.5 PH (ref 5–7)
PHOSPHATE SERPL-MCNC: 5 MG/DL (ref 2.5–4.5)
PLATELET # BLD AUTO: 147 10E9/L (ref 150–450)
POTASSIUM SERPL-SCNC: 5.8 MMOL/L (ref 3.4–5.3)
POTASSIUM SERPL-SCNC: 5.9 MMOL/L (ref 3.4–5.3)
POTASSIUM SERPL-SCNC: 6 MMOL/L (ref 3.4–5.3)
POTASSIUM SERPL-SCNC: 6.2 MMOL/L (ref 3.4–5.3)
PROT SERPL-MCNC: 5.1 G/DL (ref 6.8–8.8)
RBC # BLD AUTO: 2.15 10E12/L (ref 4.4–5.9)
RBC #/AREA URNS AUTO: <1 /HPF (ref 0–2)
SODIUM SERPL-SCNC: 134 MMOL/L (ref 133–144)
SP GR UR STRIP: 1.01 (ref 1–1.03)
TACROLIMUS BLD-MCNC: 6.2 UG/L (ref 5–15)
TME LAST DOSE: NORMAL H
TRANS CELLS #/AREA URNS HPF: <1 /HPF (ref 0–1)
TRANSFUSION STATUS PATIENT QL: NORMAL
TRANSFUSION STATUS PATIENT QL: NORMAL
URN SPEC COLLECT METH UR: NORMAL
UROBILINOGEN UR STRIP-MCNC: NORMAL MG/DL (ref 0–2)
WBC # BLD AUTO: 4.1 10E9/L (ref 4–11)
WBC #/AREA URNS AUTO: 1 /HPF (ref 0–2)

## 2017-03-24 PROCEDURE — 76700 US EXAM ABDOM COMPLETE: CPT

## 2017-03-24 PROCEDURE — 84132 ASSAY OF SERUM POTASSIUM: CPT | Performed by: STUDENT IN AN ORGANIZED HEALTH CARE EDUCATION/TRAINING PROGRAM

## 2017-03-24 PROCEDURE — 86901 BLOOD TYPING SEROLOGIC RH(D): CPT | Performed by: NURSE PRACTITIONER

## 2017-03-24 PROCEDURE — 84100 ASSAY OF PHOSPHORUS: CPT | Performed by: STUDENT IN AN ORGANIZED HEALTH CARE EDUCATION/TRAINING PROGRAM

## 2017-03-24 PROCEDURE — 84132 ASSAY OF SERUM POTASSIUM: CPT | Performed by: PHYSICIAN ASSISTANT

## 2017-03-24 PROCEDURE — P9016 RBC LEUKOCYTES REDUCED: HCPCS | Performed by: NURSE PRACTITIONER

## 2017-03-24 PROCEDURE — 25000131 ZZH RX MED GY IP 250 OP 636 PS 637: Performed by: STUDENT IN AN ORGANIZED HEALTH CARE EDUCATION/TRAINING PROGRAM

## 2017-03-24 PROCEDURE — 85025 COMPLETE CBC W/AUTO DIFF WBC: CPT | Performed by: STUDENT IN AN ORGANIZED HEALTH CARE EDUCATION/TRAINING PROGRAM

## 2017-03-24 PROCEDURE — 25000128 H RX IP 250 OP 636: Performed by: PHYSICIAN ASSISTANT

## 2017-03-24 PROCEDURE — 40000802 ZZH SITE CHECK

## 2017-03-24 PROCEDURE — 12000006 ZZH R&B IMCU INTERMEDIATE UMMC

## 2017-03-24 PROCEDURE — 25900017 H RX MED GY IP 259 OP 259 PS 637: Performed by: STUDENT IN AN ORGANIZED HEALTH CARE EDUCATION/TRAINING PROGRAM

## 2017-03-24 PROCEDURE — 86923 COMPATIBILITY TEST ELECTRIC: CPT | Performed by: NURSE PRACTITIONER

## 2017-03-24 PROCEDURE — 36415 COLL VENOUS BLD VENIPUNCTURE: CPT | Performed by: PHYSICIAN ASSISTANT

## 2017-03-24 PROCEDURE — 86900 BLOOD TYPING SEROLOGIC ABO: CPT | Performed by: NURSE PRACTITIONER

## 2017-03-24 PROCEDURE — 25000125 ZZHC RX 250: Performed by: STUDENT IN AN ORGANIZED HEALTH CARE EDUCATION/TRAINING PROGRAM

## 2017-03-24 PROCEDURE — 40000141 ZZH STATISTIC PERIPHERAL IV START W/O US GUIDANCE

## 2017-03-24 PROCEDURE — 80197 ASSAY OF TACROLIMUS: CPT | Performed by: STUDENT IN AN ORGANIZED HEALTH CARE EDUCATION/TRAINING PROGRAM

## 2017-03-24 PROCEDURE — 86850 RBC ANTIBODY SCREEN: CPT | Performed by: NURSE PRACTITIONER

## 2017-03-24 PROCEDURE — 25000128 H RX IP 250 OP 636: Performed by: STUDENT IN AN ORGANIZED HEALTH CARE EDUCATION/TRAINING PROGRAM

## 2017-03-24 PROCEDURE — 81001 URINALYSIS AUTO W/SCOPE: CPT

## 2017-03-24 PROCEDURE — 83735 ASSAY OF MAGNESIUM: CPT | Performed by: STUDENT IN AN ORGANIZED HEALTH CARE EDUCATION/TRAINING PROGRAM

## 2017-03-24 PROCEDURE — 80048 BASIC METABOLIC PNL TOTAL CA: CPT | Performed by: STUDENT IN AN ORGANIZED HEALTH CARE EDUCATION/TRAINING PROGRAM

## 2017-03-24 PROCEDURE — 00000146 ZZHCL STATISTIC GLUCOSE BY METER IP

## 2017-03-24 PROCEDURE — 25000132 ZZH RX MED GY IP 250 OP 250 PS 637: Performed by: STUDENT IN AN ORGANIZED HEALTH CARE EDUCATION/TRAINING PROGRAM

## 2017-03-24 PROCEDURE — 36415 COLL VENOUS BLD VENIPUNCTURE: CPT | Performed by: STUDENT IN AN ORGANIZED HEALTH CARE EDUCATION/TRAINING PROGRAM

## 2017-03-24 PROCEDURE — 25000132 ZZH RX MED GY IP 250 OP 250 PS 637: Performed by: PHYSICIAN ASSISTANT

## 2017-03-24 PROCEDURE — 80076 HEPATIC FUNCTION PANEL: CPT | Performed by: STUDENT IN AN ORGANIZED HEALTH CARE EDUCATION/TRAINING PROGRAM

## 2017-03-24 RX ORDER — FUROSEMIDE 10 MG/ML
40 INJECTION INTRAMUSCULAR; INTRAVENOUS ONCE
Status: DISCONTINUED | OUTPATIENT
Start: 2017-03-24 | End: 2017-03-24

## 2017-03-24 RX ORDER — FUROSEMIDE 10 MG/ML
40 INJECTION INTRAMUSCULAR; INTRAVENOUS ONCE
Status: COMPLETED | OUTPATIENT
Start: 2017-03-24 | End: 2017-03-24

## 2017-03-24 RX ORDER — MAGNESIUM OXIDE 400 MG/1
400 TABLET ORAL 2 TIMES DAILY
Status: DISCONTINUED | OUTPATIENT
Start: 2017-03-24 | End: 2017-03-26 | Stop reason: HOSPADM

## 2017-03-24 RX ORDER — SODIUM POLYSTYRENE SULFONATE 15 G/60ML
15 SUSPENSION ORAL; RECTAL ONCE
Status: COMPLETED | OUTPATIENT
Start: 2017-03-24 | End: 2017-03-24

## 2017-03-24 RX ORDER — SODIUM POLYSTYRENE SULFONATE 15 G/60ML
15 SUSPENSION ORAL; RECTAL ONCE
Status: DISCONTINUED | OUTPATIENT
Start: 2017-03-24 | End: 2017-03-24

## 2017-03-24 RX ADMIN — OXYCODONE HYDROCHLORIDE 10 MG: 5 TABLET ORAL at 14:58

## 2017-03-24 RX ADMIN — CLOTRIMAZOLE 1 TROCHE: 10 LOZENGE ORAL at 09:47

## 2017-03-24 RX ADMIN — GABAPENTIN 300 MG: 300 CAPSULE ORAL at 14:58

## 2017-03-24 RX ADMIN — MAGNESIUM OXIDE TAB 400 MG (241.3 MG ELEMENTAL MG) 400 MG: 400 (241.3 MG) TAB at 19:44

## 2017-03-24 RX ADMIN — MULTIPLE VITAMINS W/ MINERALS TAB 1 TABLET: TAB at 09:47

## 2017-03-24 RX ADMIN — SODIUM BICARBONATE: 84 INJECTION, SOLUTION INTRAVENOUS at 21:48

## 2017-03-24 RX ADMIN — INSULIN GLARGINE 45 UNITS: 100 INJECTION, SOLUTION SUBCUTANEOUS at 10:56

## 2017-03-24 RX ADMIN — SODIUM POLYSTYRENE SULFONATE 15 G: 15 SUSPENSION ORAL; RECTAL at 15:43

## 2017-03-24 RX ADMIN — LIDOCAINE 1 PATCH: 50 PATCH TOPICAL at 09:48

## 2017-03-24 RX ADMIN — MYCOPHENOLATE MOFETIL 1000 MG: 250 CAPSULE ORAL at 19:43

## 2017-03-24 RX ADMIN — CYCLOBENZAPRINE HYDROCHLORIDE 10 MG: 10 TABLET, FILM COATED ORAL at 19:26

## 2017-03-24 RX ADMIN — TACROLIMUS 6 MG: 5 CAPSULE ORAL at 18:47

## 2017-03-24 RX ADMIN — Medication 1 CAPSULE: at 19:44

## 2017-03-24 RX ADMIN — CLOTRIMAZOLE 1 TROCHE: 10 LOZENGE ORAL at 14:58

## 2017-03-24 RX ADMIN — GABAPENTIN 300 MG: 300 CAPSULE ORAL at 09:51

## 2017-03-24 RX ADMIN — GABAPENTIN 300 MG: 300 CAPSULE ORAL at 19:44

## 2017-03-24 RX ADMIN — VALGANCICLOVIR HYDROCHLORIDE 450 MG: 450 TABLET ORAL at 09:40

## 2017-03-24 RX ADMIN — ASPIRIN 325 MG ORAL TABLET 325 MG: 325 PILL ORAL at 09:40

## 2017-03-24 RX ADMIN — OMEPRAZOLE 20 MG: 20 CAPSULE, DELAYED RELEASE ORAL at 09:40

## 2017-03-24 RX ADMIN — OMEPRAZOLE 20 MG: 20 CAPSULE, DELAYED RELEASE ORAL at 15:43

## 2017-03-24 RX ADMIN — MEGESTROL ACETATE 400 MG: 40 SUSPENSION ORAL at 09:41

## 2017-03-24 RX ADMIN — SENNOSIDES AND DOCUSATE SODIUM 2 TABLET: 8.6; 5 TABLET ORAL at 19:45

## 2017-03-24 RX ADMIN — OXYCODONE HYDROCHLORIDE 10 MG: 5 TABLET ORAL at 19:21

## 2017-03-24 RX ADMIN — INSULIN ASPART 1 UNITS: 100 INJECTION, SOLUTION INTRAVENOUS; SUBCUTANEOUS at 18:02

## 2017-03-24 RX ADMIN — OXYCODONE HYDROCHLORIDE 10 MG: 5 TABLET ORAL at 09:57

## 2017-03-24 RX ADMIN — TACROLIMUS 6 MG: 5 CAPSULE ORAL at 09:46

## 2017-03-24 RX ADMIN — MYCOPHENOLATE MOFETIL 1000 MG: 250 CAPSULE ORAL at 09:45

## 2017-03-24 RX ADMIN — FUROSEMIDE 40 MG: 10 INJECTION, SOLUTION INTRAVENOUS at 16:01

## 2017-03-24 RX ADMIN — CLOTRIMAZOLE 1 TROCHE: 10 LOZENGE ORAL at 19:44

## 2017-03-24 RX ADMIN — MEGESTROL ACETATE 400 MG: 40 SUSPENSION ORAL at 19:45

## 2017-03-24 RX ADMIN — INSULIN ASPART 1 UNITS: 100 INJECTION, SOLUTION INTRAVENOUS; SUBCUTANEOUS at 09:58

## 2017-03-24 RX ADMIN — SULFAMETHOXAZOLE AND TRIMETHOPRIM 1 TABLET: 400; 80 TABLET ORAL at 09:40

## 2017-03-24 RX ADMIN — MAGNESIUM OXIDE TAB 400 MG (241.3 MG ELEMENTAL MG) 400 MG: 400 (241.3 MG) TAB at 12:20

## 2017-03-24 RX ADMIN — FUROSEMIDE 40 MG: 10 INJECTION, SOLUTION INTRAVENOUS at 21:42

## 2017-03-24 RX ADMIN — SODIUM BICARBONATE: 84 INJECTION, SOLUTION INTRAVENOUS at 08:35

## 2017-03-24 RX ADMIN — CYCLOBENZAPRINE HYDROCHLORIDE 10 MG: 10 TABLET, FILM COATED ORAL at 09:57

## 2017-03-24 RX ADMIN — Medication 1 CAPSULE: at 09:41

## 2017-03-24 ASSESSMENT — PAIN DESCRIPTION - DESCRIPTORS
DESCRIPTORS: ACHING
DESCRIPTORS: ACHING;CONSTANT

## 2017-03-24 NOTE — H&P
Liver Transplant Surgery Admission HP    Camacho Bhagat MRN# [id number]     Date of Admission: 3/23/2017    CC: hyperkalemia    Assessment: 51 year old male with history of non-alcoholic liver cirrhosis and HCC s/p liver transplant 3/4 being admitted for hyperkalemia.     Plan:   - Admit to liver transplant service, 6B for tele  - Bicarb continuous gtt 100 cc/hr  - Kayexalate, insulin/glucose  - EKG stable; IV Ca 1g  - Monitor K Q4h  - Low potassium diet  - Resume home meds  - Repeat complete set labs in AM     Discussed with fellow, Dr. Cross.    HPI: 51 year old male with history of non-alcoholic liver cirrhosis and HCC s/p liver transplant 3/4 who was found to have hyperkalemia on routine lab draw today. He went to [facility] ED and received calcium, insulin/glucose and sodium bicarb. He was then transferred to [facility] for further management of hyperkalemia.     He has been taking immunosuppression medications as prescribed without issues. No rejection post op. Tac level has been mostly under goal, improved this week after dose increased to 6mg. TABITHA drain accidentally fell out 3/15 and site was leaking ascites requiring stitch placement in clinic. He was then started on lasix 80mg qd for ascites. Has had issue with poor appetite, and has been on megace.     His only complaint is back pain for which MRI spine was completed and referral sent to neurosurgery; has not been able to schedule appointment yet. He notes that back pain began after surgery, feels like dull achy mid-back pain that sometimes feels sharp with certain movements. Back pain does not wake him up in the middle of the night, and he does not have bowel or bladder changes. Otherwise, denies recent illness, fevers, chills, headache, chest pain, shortness of breath, abdominal pain, nausea, emesis, diarrhea, constipation, dysuria or muscle weakness.     Past Medical History:  Past Medical History:   Diagnosis Date     Acute venous embolism  and thrombosis of other specified veins     Deep vein thrombophlebitis, filter placed     Cancer (H)      Depressive disorder, not elsewhere classified      Esophageal reflux      Fibromyalgia 2009    dx with Dr Benitez( Rheum)     Osteoarthritis      Other and unspecified hyperlipidemia      Other chronic nonalcoholic liver disease     Fatty liver      Other testicular hypofunction      Pneumonia, organism unspecified 10-01    Included ARDS, sepsis, and  acute renal failure; hospitalized     Rheumatoid arthritis(714.0)      Type II or unspecified type diabetes mellitus without mention of complication, not stated as uncontrolled     Managed by endocrinology     Unspecified essential hypertension     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     Unspecified sleep apnea     Uses BiPAP       Past Surgical History:  Past Surgical History:   Procedure Laterality Date     BENCH LIVER N/A 3/4/2017    Procedure: BENCH LIVER;  Surgeon: Jovan Tran MD;  Location:  OR      NONSPECIFIC PROCEDURE      tracheostomy     C NONSPECIFIC PROCEDURE      repair of deviated septum     C NONSPECIFIC PROCEDURE      Rt knee arthroscopy     C TOTAL KNEE ARTHROPLASTY      Right knee arthroscopy     CHOLECYSTECTOMY       ESOPHAGOSCOPY, GASTROSCOPY, DUODENOSCOPY (EGD), COMBINED N/A 2016    Procedure: COMBINED ESOPHAGOSCOPY, GASTROSCOPY, DUODENOSCOPY (EGD), BIOPSY SINGLE OR MULTIPLE;  Surgeon: Trent Pederson MD;  Location:  GI     TRANSPLANT LIVER RECIPIENT  DONOR N/A 3/4/2017    Procedure: TRANSPLANT LIVER RECIPIENT  DONOR;  Surgeon: Jovan Tran MD;  Location: [facility] OR       Allergies:     Allergies   Allergen Reactions     Erythromycin GI Disturbance     Vioxx      Nausea, vomiting       Medications:    Current Facility-Administered Medications on File Prior to Encounter:  [COMPLETED] oxyCODONE (ROXICODONE) IR tablet 10 mg   [COMPLETED] calcium gluconate 1 g in D5W 100 mL  intermittent infusion   [COMPLETED] sodium bicarbonate 8.4 % injection 50 mEq   [COMPLETED] dextrose 50 % injection 25 g   [COMPLETED] insulin (regular) (HumuLIN R/NovoLIN R) injection 10 Units   [COMPLETED] 0.9% sodium chloride BOLUS   albumin human 25 % injection 12.5 g     Current Outpatient Prescriptions on File Prior to Encounter:  oxyCODONE (ROXICODONE) 10 MG IR tablet Take 1 tablet (10 mg) by mouth every 6 hours as needed for moderate to severe pain   megestrol (MEGACE) 40 MG/ML suspension Take 10 mLs (400 mg) by mouth 2 times daily   furosemide (LASIX) 20 MG tablet Take 4 tablets (80 mg) by mouth daily For 1 week,  On 3/27 reduce dose to 2 tablets (40 mg) by mouth daily   tacrolimus (PROGRAF - GENERIC EQUIVALENT) 1 MG capsule Take 6 capsules (6 mg) by mouth every 12 hours   cephALEXin (KEFLEX) 500 MG capsule Take 1 capsule (500 mg) by mouth 4 times daily   furosemide (LASIX) 20 MG tablet Take 4 tablets (80 mg) by mouth daily   cyclobenzaprine (FLEXERIL) 10 MG tablet Take 1 tablet (10 mg) by mouth 3 times daily as needed for muscle spasms   oxyCODONE (ROXICODONE) 10 MG IR tablet Take 1 tablet (10 mg) by mouth every 4 hours as needed for moderate to severe pain   lidocaine (LIDODERM) 5 % Patch Place 1 patch onto the skin every 24 hours   LACTOBACILLUS EXTRA STRENGTH CAPS Take 1 capsule by mouth 2 times daily   prochlorperazine (COMPAZINE) 5 MG tablet Take 1-2 tablets (5-10 mg) by mouth every 6 hours as needed for nausea or vomiting   cyclobenzaprine (FLEXERIL) 5 MG tablet Take 2 tablets (10 mg) by mouth 3 times daily as needed for muscle spasms   gabapentin (NEURONTIN) 300 MG capsule Take 1 capsule (300 mg) by mouth 3 times daily   oxyCODONE (ROXICODONE) 5 MG IR tablet Take 1-2 tablets (5-10 mg) by mouth every 4 hours as needed for moderate to severe pain   aspirin 325 MG tablet 1 tablet (325 mg) by Oral or Feeding Tube route daily   insulin aspart (NOVOLOG PEN) 100 UNIT/ML injection DOSE:  1 units per 8 grams  of carbohydrate. With meals and snacks. Only chart total amount of units given.  Do not give if pre-prandial glucose is less than 60 mg/dL.   insulin glargine (LANTUS) 100 UNIT/ML injection Inject 45 Units Subcutaneous every 24 hours   valGANciclovir (VALCYTE) 450 MG tablet Take 1 tablet (450 mg) by mouth daily   mycophenolate (CELLCEPT - GENERIC EQUIVALENT) 250 MG capsule Take 4 capsules (1,000 mg) by mouth 2 times daily   senna-docusate (SENOKOT-S;PERICOLACE) 8.6-50 MG per tablet Take 2 tablets by mouth 2 times daily   sulfamethoxazole-trimethoprim (BACTRIM/SEPTRA) 400-80 MG per tablet Take 1 tablet by mouth daily   clotrimazole (MYCELEX) 10 MG LOZG lozenge Place 1 lozenge (1 Beatrice) inside cheek 3 times daily   multivitamin, therapeutic with minerals (THERA-VIT-M) TABS tablet Take 1 tablet by mouth daily   insulin aspart (NOVOLOG PEN) 100 UNIT/ML injection DOSE:  1 units per 10 grams of carbohydrate.  Only chart total amount of units given. HOLD CARB COVERAGE FOR  03/10/17 . ENDOCRINE TEAM WILL GIVE ALTERNATE ORDERS PAGER 391 Do not give if pre-prandial glucose is less than 60 mg/dL.   insulin aspart (NOVOLOG PEN) 100 UNIT/ML injection Inject 1-10 Units Subcutaneous 3 times daily (before meals) For Pre-Meal  - 189 give 1 unit. For Pre-Meal  - 239 give 2 units. For Pre-Meal  - 289 give 3 units. For Pre-Meal  - 339 give 4 units. For Pre-Meal -399 give 5 units.For Pre-Meal -449 give 6 units.For Pre-Meal BG = or > 450 give 7 units.   insulin aspart (NOVOLOG PEN) 100 UNIT/ML injection Inject 1-7 Units Subcutaneous At Bedtime Bedtime Blood Glucose (BG)For  - 249 give 1 units. For  - 299 give 2 units. For  - 349 give 3 units.For - 399 give 4 units. For BG = or > 400 give 5 units.   gabapentin (NEURONTIN) 300 MG capsule Take 2 capsules (600 mg) by mouth At Bedtime   ondansetron (ZOFRAN-ODT) 4 MG ODT tab Take 1 tablet (4 mg) by mouth every 8 hours as needed for  nausea   cyclobenzaprine (FLEXERIL) 10 MG tablet Take 1 tablet (10 mg) by mouth 3 times daily as needed for muscle spasms   omeprazole (PRILOSEC) 20 MG CR capsule Take 1 capsule (20 mg) by mouth 2 times daily (before meals)   glucagon 1 MG SOLR injection Inject 1 mg Subcutaneous every 15 minutes as needed for low blood sugar (May repeat x 1 only)   lactobacillus rhamnosus, GG, (CULTURELL) capsule Take 1 capsule by mouth 2 times daily       Social History:  Social History     Social History     Marital status:      Spouse name: N/A     Number of children: 1     Years of education: N/A     Occupational History            Social History Main Topics     Smoking status: Former Smoker     Smokeless tobacco: Former User     Types: Chew     Quit date: 10/8/2015      Comment: Has used chewing tobacco since age 15 , chewed for 20yrs      Alcohol use No      Comment: last drink about a year ago (quit 2001)     Drug use: No     Sexual activity: Yes     Partners: Female     Other Topics Concern     Not on file     Social History Narrative    uSED TO BE      Back in school now           Family History:  Family History   Problem Relation Age of Onset     DIABETES Father      Hypertension Father      Substance Abuse Father      Arthritis Mother      CANCER Mother      Thyroid     Thyroid Disease Mother      Other Cancer Mother      Colon Cancer No family hx of      Hyperlipidemia No family hx of      Coronary Artery Disease No family hx of      CEREBROVASCULAR DISEASE No family hx of      Breast Cancer No family hx of      Prostate Cancer No family hx of        ROS:  CONSTITUTIONAL: no fatigue, no unexpected change in weight  SKIN: no worrisome rashes, no worrisome moles, no worrisome lesions  EYES: no acute vision problems or changes  ENT: no ear problems, no mouth problems, no throat problems  RESP: no significant cough, no shortness of breath  CV: no chest pain, no palpitations, no  worsening peripheral edema  GI: no nausea, no vomiting, no constipation, no diarrhea  MSK: + mid-back pain    Exam:  BP (!) 145/92 (BP Location: Left arm)  Temp 98.5  F (36.9  C) (Oral)  Resp 16  Ht 1.829 m (6')  Wt 106 kg (233 lb 9.6 oz)  SpO2 99%  BMI 31.68 kg/m2     No acute distress, interactive  Non labored respirations on RA  Regular rate, regular rhythm peripheral pulses 2+  Soft, non-tender, non-distended  Incision c/d/i healing well with staples intact, no erythema  Extremities warm, well perfused  Alert, oriented    Labs:   K 6.8 --> 6.1  Cr 1.82  Na 135  CO2 25  BUN 47  Mg 1.4  Phos 4.8  Wbc 5.2  Hgb 7.4    Karla Wang MD  General Surgery PGY-1

## 2017-03-24 NOTE — CONSULTS
Nephrology Initial Consult  March 24, 2017      Camacho Bhagat MRN:[id number] YOB: 1965  Date of Admission:3/23/2017  Primary care provider: Shay Kirkpatrick  Requesting physician: Tip Zhang MD    ASSESSMENT AND RECOMMENDATIONS:   1. Hyperkalemia- Improving and likely multifactorial: renal insufficiency, dietary indiscretion, CNI, hypovolemia.    - Would recommend formal dietary consult to review complex diet including K restriction/DM and Renal diet.    - Bactrim on hold   - Maximize renal function    2. EJ - Etiology likely pre renal given improvement with IVF in setting of CNI ( level 12.1 on 3/20) and recent initiation of Furosemide on 3/15/17. UA on 3/16/17 suggests hypovolemia given 28 Hyaline casts. UA today is pending. Renal US pending. Baseline creat 0.8 on 2/16/17.    - Monitor renal function for improvement, unlikely to return to baseline given IS   - Avoid Nephrotoxins if possible   - Renal dose medications   - Daily chemistries    3. Volume status - Hypovolemia. No edema. No dyspnea. Weight is 106 kg. ( Weight was 102 Kg on 3/8/17 w/o edema). Urine output not measured. -140/   - Daily standing weights   - I/O   - Would continue to hold Furosemide    4. Acid base - No acute concerns. Bicarb is 27    5. BMD - Corrected Ca 9.2, Phos 5.0, Albumin 2.0    6. Liver transplant 3/4/17, IS: Cellcept/Tacrolimus. Tac level 9.8 3/23/17. Additionally on Bactrim, Valcyte, Mycelex. Transplant managing   - Bactrim on hold per Transplant    7. Nutrition - Poor appetite. Started on Megace as outpt. Albumin 2.0    8. T2DM - Wife notes episodes of hypoglycemia at home in setting of poor appetite. BS < 160 here despite being on D5 infusion.    - Adjustment of insulin per Primary team.     9. Anemia - Hgb today 6.6, down from 7.2 yesterday. No signs of bleeding. May be dilutional given IVF. Hgb running in the 7-8 range. Asymptomatic. On ASA, PPI.    - Transfusions per primary  team    Recommendations were communicated to primary team via progress note    Seen and discussed with Dr. Manuel Cazares, NP   [phone]    REASON FOR CONSULT: Hyperkalemia/EJ    HISTORY OF PRESENT ILLNESS:  Camacho Bhagat is a 51 year old male s/p liver transplant 3/4/17 for Non ETOH cirrhosis/HCC admitted for hyperkalemia found on routine transplant f/u labs.   Patient denies any symptoms and reports that he was feeling well. Denies regular consumption of high K foods/drinks/supplements, however discussion with wife reveals that patient had a bowl of tomato soup yesterday and OJ for hypoglycemia. Potassium has been chronically elevated, ie > 5.7 since 3/12/17. He has not been acidotic since his transplant. Patient did have HRS prior to transplant, but did not require RRT. Peak creat prior to transplant was 2.8 and declined post transplant to low of 1.16 on 3/10/17. His K was 4.4 on that day. Beginning on 3/12/17 both his creat and K have been elevated. K > 5.7 and Creat steadily rising to high of 1.8 on 3/23/17. He was given IVF overnight with bicarb for treatment of hyperkalemia and creat 1.5 today. IS therapy includes Tacrolimus and Bactrim.   He was started on Lasix 80 mg qd on 3/15/17 for treatment of ascites. Creat was in the 1.4-1.5 range at that time  BS < 160  Baseline creat was 0.8 on 2/16/17    PAST MEDICAL HISTORY:  Reviewed with patient on 03/24/2017   Non alcoholic cirrhosis  HCC  S/P liver transplant 3/4/17  DVT s/p filter  Depression  GERD  Fibromyalgia  HLD  Testicular hypofunction  RA  T2DM  HTN  RONNIE  Obesity  H/O Tobacco abuse      Past Surgical History:   Procedure Laterality Date     BENCH LIVER N/A 3/4/2017    Procedure: BENCH LIVER;  Surgeon: Jovan Tran MD;  Location: [facility] OR     C NONSPECIFIC PROCEDURE      tracheostomy     C NONSPECIFIC PROCEDURE      repair of deviated septum     C NONSPECIFIC PROCEDURE  2007    Rt knee arthroscopy     C TOTAL KNEE ARTHROPLASTY  2008     Right knee arthroscopy     CHOLECYSTECTOMY       ESOPHAGOSCOPY, GASTROSCOPY, DUODENOSCOPY (EGD), COMBINED N/A 2016    Procedure: COMBINED ESOPHAGOSCOPY, GASTROSCOPY, DUODENOSCOPY (EGD), BIOPSY SINGLE OR MULTIPLE;  Surgeon: Trent Pederson MD;  Location:  GI     TRANSPLANT LIVER RECIPIENT  DONOR N/A 3/4/2017    Procedure: TRANSPLANT LIVER RECIPIENT  DONOR;  Surgeon: Jovan Tran MD;  Location:  OR        MEDICATIONS:  PTA Meds  Prior to Admission medications    Medication Sig Last Dose Taking? Auth Provider   cephALEXin (KEFLEX) 500 MG capsule Take 1 capsule (500 mg) by mouth 4 times daily 3/23/2017 at Unknown time Yes Jovan Tran MD   aspirin 325 MG tablet 1 tablet (325 mg) by Oral or Feeding Tube route daily 3/23/2017 at Unknown time Yes Ada Driver PA-C   oxyCODONE (ROXICODONE) 10 MG IR tablet Take 1 tablet (10 mg) by mouth every 6 hours as needed for moderate to severe pain   Jovan Tran MD   megestrol (MEGACE) 40 MG/ML suspension Take 10 mLs (400 mg) by mouth 2 times daily   Jovan Tran MD   furosemide (LASIX) 20 MG tablet Take 4 tablets (80 mg) by mouth daily For 1 week,  On 3/27 reduce dose to 2 tablets (40 mg) by mouth daily   Jovan Tran MD   tacrolimus (PROGRAF - GENERIC EQUIVALENT) 1 MG capsule Take 6 capsules (6 mg) by mouth every 12 hours   Jovan Tran MD   furosemide (LASIX) 20 MG tablet Take 4 tablets (80 mg) by mouth daily   Jovan Tran MD   cyclobenzaprine (FLEXERIL) 10 MG tablet Take 1 tablet (10 mg) by mouth 3 times daily as needed for muscle spasms   Jovan Tran MD   oxyCODONE (ROXICODONE) 10 MG IR tablet Take 1 tablet (10 mg) by mouth every 4 hours as needed for moderate to severe pain   Jovan Tran MD   lidocaine (LIDODERM) 5 % Patch Place 1 patch onto the skin every 24 hours   Glenn, Dora M, NP   LACTOBACILLUS EXTRA STRENGTH CAPS Take 1 capsule by mouth 2 times daily    Dora Elizabeth NP   prochlorperazine (COMPAZINE) 5 MG tablet Take 1-2 tablets (5-10 mg) by mouth every 6 hours as needed for nausea or vomiting   Dora Elizabeth NP   cyclobenzaprine (FLEXERIL) 5 MG tablet Take 2 tablets (10 mg) by mouth 3 times daily as needed for muscle spasms   Dora Elizabeth NP   gabapentin (NEURONTIN) 300 MG capsule Take 1 capsule (300 mg) by mouth 3 times daily   Dora Elizabeth NP   oxyCODONE (ROXICODONE) 5 MG IR tablet Take 1-2 tablets (5-10 mg) by mouth every 4 hours as needed for moderate to severe pain   Ada Driver PA-C   insulin aspart (NOVOLOG PEN) 100 UNIT/ML injection DOSE:  1 units per 8 grams of carbohydrate. With meals and snacks. Only chart total amount of units given.  Do not give if pre-prandial glucose is less than 60 mg/dL.   Ada Driver PA-C   insulin glargine (LANTUS) 100 UNIT/ML injection Inject 45 Units Subcutaneous every 24 hours   Ada Driver PA-C   valGANciclovir (VALCYTE) 450 MG tablet Take 1 tablet (450 mg) by mouth daily   Ada Driver PA-C   mycophenolate (CELLCEPT - GENERIC EQUIVALENT) 250 MG capsule Take 4 capsules (1,000 mg) by mouth 2 times daily   Ada Driver PA-C   senna-docusate (SENOKOT-S;PERICOLACE) 8.6-50 MG per tablet Take 2 tablets by mouth 2 times daily   Ada Driver PA-C   sulfamethoxazole-trimethoprim (BACTRIM/SEPTRA) 400-80 MG per tablet Take 1 tablet by mouth daily   Ada Driver PA-C   clotrimazole (MYCELEX) 10 MG LOZG lozenge Place 1 lozenge (1 Beatrice) inside cheek 3 times daily   Ada Driver PA-C   multivitamin, therapeutic with minerals (THERA-VIT-M) TABS tablet Take 1 tablet by mouth daily   Ada Driver PA-C   insulin aspart (NOVOLOG PEN) 100 UNIT/ML injection DOSE:  1 units per 10 grams of carbohydrate.  Only chart total amount of units given. HOLD CARB COVERAGE FOR  03/10/17 . ENDOCRINE TEAM WILL GIVE ALTERNATE ORDERS PAGER [phone]   Do not give if pre-prandial glucose  is less than 60 mg/dL.   Ada Driver PA-C   insulin aspart (NOVOLOG PEN) 100 UNIT/ML injection Inject 1-10 Units Subcutaneous 3 times daily (before meals) For Pre-Meal  - 189 give 1 unit.   For Pre-Meal  - 239 give 2 units.   For Pre-Meal  - 289 give 3 units.   For Pre-Meal  - 339 give 4 units.   For Pre-Meal -399 give 5 units.  For Pre-Meal -449 give 6 units.  For Pre-Meal BG = or > 450 give 7 units.   Ada Driver PA-C   insulin aspart (NOVOLOG PEN) 100 UNIT/ML injection Inject 1-7 Units Subcutaneous At Bedtime Bedtime Blood Glucose (BG)  For  - 249 give 1 units.   For  - 299 give 2 units.   For  - 349 give 3 units.  For - 399 give 4 units.   For BG = or > 400 give 5 units.   Ada Driver PA-C   gabapentin (NEURONTIN) 300 MG capsule Take 2 capsules (600 mg) by mouth At Bedtime   Ada Driver PA-C   ondansetron (ZOFRAN-ODT) 4 MG ODT tab Take 1 tablet (4 mg) by mouth every 8 hours as needed for nausea   Ada Driver PA-C   cyclobenzaprine (FLEXERIL) 10 MG tablet Take 1 tablet (10 mg) by mouth 3 times daily as needed for muscle spasms   Ada Driver PA-C   omeprazole (PRILOSEC) 20 MG CR capsule Take 1 capsule (20 mg) by mouth 2 times daily (before meals)   Ada Driver PA-C   glucagon 1 MG SOLR injection Inject 1 mg Subcutaneous every 15 minutes as needed for low blood sugar (May repeat x 1 only)   Godwin Willson MD   lactobacillus rhamnosus, GG, (CULTURELL) capsule Take 1 capsule by mouth 2 times daily   Eagle Tejada MD      Current Meds    sodium chloride (PF)  3 mL Intracatheter Q8H     tacrolimus  6 mg Oral BID IS     valGANciclovir  450 mg Oral Daily     sulfamethoxazole-trimethoprim  1 tablet Oral Daily     senna-docusate  2 tablet Oral BID     omeprazole  20 mg Oral BID AC     mycophenolate  1,000 mg Oral BID     multivitamin, therapeutic with minerals  1 tablet Oral Daily     megestrol  400  mg Oral BID     lidocaine  1 patch Transdermal Q24H     lactobacillus rhamnosus (GG)  1 capsule Oral BID     insulin aspart  1-7 Units Subcutaneous At Bedtime     insulin aspart  1-10 Units Subcutaneous TID AC     clotrimazole  1 Beatrice Buccal TID     aspirin  325 mg Oral or Feeding Tube Daily     lidocaine   Transdermal Q24h     lidocaine   Transdermal Q8H     insulin glargine  45 Units Subcutaneous QAM AC     gabapentin  300 mg Oral TID     Infusion Meds    sodium bicabonate in 5% dextrose for infusion 100 mL/hr at 17 1000       ALLERGIES:    Allergies   Allergen Reactions     Erythromycin GI Disturbance     Vioxx      Nausea, vomiting       REVIEW OF SYSTEMS:  Denies weakness/chest pain with hyperkalemia. Denies regular consumption of high K foods/drinks/supplements. Furosemide dose has been decreased recently. No chest pain, dyspnea, abdominal pain. Voiding w/o difficulty. No bowel concerns. Has poor appetite and Megace recently started.     SOCIAL HISTORY:   , lives with wife/daughter/dog. Independent with mobility/cares    FAMILY MEDICAL HISTORY:   Family History   Problem Relation Age of Onset     DIABETES Father      Hypertension Father      Substance Abuse Father      Arthritis Mother      CANCER Mother      Thyroid     Thyroid Disease Mother      Other Cancer Mother      Colon Cancer No family hx of      Hyperlipidemia No family hx of      Coronary Artery Disease No family hx of      CEREBROVASCULAR DISEASE No family hx of      Breast Cancer No family hx of      Prostate Cancer No family hx of      PHYSICAL EXAM:   Temp  Av.1  F (36.7  C)  Min: 95.7  F (35.4  C)  Max: 102.1  F (38.9  C)  Arterial Line MAP (mmHg)  Av.7 mmHg  Min: 52 mmHg  Max: 291 mmHg  Arterial Line BP  Min: 92/38  Max: 291/291      Pulse  Av  Min: 6  Max: 113 Resp  Av  Min: 0  Max: 24  SpO2  Av.7 %  Min: 88 %  Max: 100 %  FiO2 (%)  Av.9 %  Min: 21 %  Max: 60 %       /80 (BP Location: Left  arm)  Temp 97.9  F (36.6  C) (Oral)  Resp 18  Ht 1.829 m (6')  Wt 106 kg (233 lb 9.6 oz)  SpO2 97%  BMI 31.68 kg/m2   Date 03/24/17 0700 - 03/25/17 0659   Shift 0105-2538 8737-7428 8328-3680 24 Hour Total   I  N  T  A  K  E   P.O. 300   300    Shift Total  (mL/kg) 300  (2.83)   300  (2.83)   O  U  T  P  U  T   Shift Total  (mL/kg)       Weight (kg) 105.96 105.96 105.96 105.96        Admit Weight: 106 kg (233 lb 9.6 oz) (standing scale 2)     GENERAL APPEARANCE: Calm, pleasant, NAD  EYES: no scleral icterus, pupils equal  Pulmonary: CTA. Breathing is non labored. Not on supplemental oxygen  CV: RRR    - Edema - none  GI: soft, nontender, obese, incision line intact. + BS    MS: no evidence of inflammation in joints, no muscle tenderness  : no Crowe  SKIN: no rash, warm, dry, no cyanosis  NEURO: mentation intact and speech normal    LABS:   CMP  Recent Labs  Lab 03/24/17  0556 03/24/17  0039 03/23/17  2242 03/23/17  2054 03/23/17  1502 03/23/17  1130 03/20/17  0916     --   --  135 134 134 131*   POTASSIUM 5.8* 6.0* 6.1* 6.8* 6.5* 6.8* 5.9*   CHLORIDE 99  --   --  101 100 101 97   CO2 27  --   --  25 25 25 24   ANIONGAP 8  --   --  9 9 8 10   *  --   --  106* 104* 122* 89   BUN 44*  --   --  47* 48* 46* 48*   CR 1.58*  --   --  1.82* 1.63* 1.56* 1.75*   GFRESTIMATED 46*  --   --  39* 45* 47* 41*   GFRESTBLACK 56*  --   --  47* 54* 57* 50*   JACOB 8.4*  --   --  8.6 8.3* 8.6 8.6   MAG 1.5*  --   --  1.4*  --  1.7 1.6   PHOS 5.0*  --   --  4.8*  --  5.2* 5.3*   PROTTOTAL 5.1*  --   --   --   --  5.9* 6.0*   ALBUMIN 2.0*  --   --   --   --  2.3* 2.2*   BILITOTAL 1.7*  --   --   --   --  1.5* 2.0*   ALKPHOS 144  --   --   --   --  162* 213*   AST 19  --   --   --   --  29 21   ALT 15  --   --   --   --  19 20     CBC  Recent Labs  Lab 03/24/17  0556 03/23/17  2054 03/23/17  1130 03/20/17  0916   HGB 6.6* 7.4* 7.3* 7.2*   WBC 4.1 5.2 6.2 6.7   RBC 2.15* 2.37* 2.27* 2.24*   HCT 20.6* 22.6* 22.4* 21.4*    MCV 96 95 99 96   MCH 30.7 31.2 32.2 32.1   MCHC 32.0 32.7 32.6 33.6   RDW 17.7* 18.0* 17.9* 17.7*   * 157 190 143*     INRNo lab results found in last 7 days.  ABGNo lab results found in last 7 days.   URINE STUDIES  Recent Labs   Lab Test  03/16/17   1010  03/03/17   1405  03/01/17   0340  02/27/17   1310   COLOR  Sun  Dark Yellow  Dark Yellow  Dark Brown   APPEARANCE  Slightly Cloudy  Slightly Cloudy  Slightly Cloudy  Slightly Cloudy   URINEGLC  Negative  Negative  Negative  300*   URINEBILI  Negative  Moderate   This is an unconfirmed screening test result. A positive result may be false.  *  Moderate   This is an unconfirmed screening test result. A positive result may be false.  *  Large   This is an unconfirmed screening test result. A positive result may be false.  *   URINEKETONE  Negative  Negative  Negative  Negative   SG  1.012  1.010  1.011  1.010   UBLD  Negative  Negative  Negative  Negative   URINEPH  5.0  5.0  5.0  5.0   PROTEIN  Negative  Negative  Negative  Negative   NITRITE  Negative  Negative  Negative  Negative   LEUKEST  Negative  Negative  Negative  Negative   RBCU  0  1  <1  2   WBCU  5*  0  3*  5*     Recent Labs   Lab Test  04/11/16   1345  02/08/16   1234  04/11/11   1201   UTPG  0.41*  0.33*  <0.08     PTH  No lab results found.  IRON STUDIES  Recent Labs   Lab Test  02/08/16   1144   IRON  93   FEB  230*   IRONSAT  41       Cinda Cazares, JUANITO  Attestation:  This patient has been seen, examined and evaluated by me, Char Jackson as a shared visit with the NP/PA above.      In brief:  Camacho Bhagat is a 51 year old male S/P liver transplant 3/4 readm from clinic for high K    Physical exam: normal VS, lngs clear, no edema  Vitals along with Ins/outs reviewed.    K 6.8, normal bicarb    Key management decisions made by me:  hyperkalemia from dietary indiscretion, tac, bactrim.  Does not have acidosis and glu control has been good    Recs: dietary education  DC  bactrim  Tac dosing as low as possible  Agree with AVE hurd today    I have reviewed today's vitals and labs.    Char Jackson MD

## 2017-03-24 NOTE — PLAN OF CARE
Problem: Goal Outcome Summary  Goal: Goal Outcome Summary  Outcome: No Change  /85 (BP Location: Left arm)  Temp 98.4  F (36.9  C) (Oral)  Resp 14  Ht 1.829 m (6')  Wt 106 kg (233 lb 9.6 oz)  SpO2 96%  BMI 31.68 kg/m2     Pt care from 0700 to 1930. A&Ox4. Afebrile. VSS on room air. Sinus rhythm. Mid back pain comfortably managed with oxycodone 10 mg every four hours.  Nephrology consult conducted and renal and abdominal ultrasound performed (see results). Potassium checks placed for every 6 hours. Most recent potassium 5.9; kayexalate given once and lasix administered before blood. Currently one unit PRBC is running for hgb 6.6; plan to give another lasix following completion of blood. UA sent. Up independently in the room; ambulated the floor with a walker and SBA x1. Tolerating a 2 gram potassium diet. Blood sugar checks conducted before meals; most recent 160; SSI given as ordered. Will continue with the current plan of care and update the team with changes.

## 2017-03-24 NOTE — PROGRESS NOTES
Transplant Surgery  Inpatient Daily Progress Note  03/24/2017    Assessment & Plan: Camacho Bhagat is a 51 year old male with 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 S/p DD OLT on 3/4/2017. EJ post transplant due to HRS, IV contrast (CTA to eval for HA). Creatinine increased to 3.3 post transplant. He did not require HD. Creatinine decreased to 1.2 on 3/10. TABITHA drain was accidentally removed. Patient was placed on lasix 80 mg PO (3/15) to reduce ascites. On routine labs. Elevated potassium outpatient, not improved with lasix. Creatinine increased from 1.2 up to 1.8. Admitted for further treatment.    Graft function:good, LFTs decreased. TB 1.7. 3/5 CT angiogram showing patent HA. On  mg daily for HAT ppx.   US abd to evaluate for ascites.   Immunosuppression management: MMF 1 gm BID and tac 6 mg BID.  Tac 3/20 tac level 12.1, 3/23 9.8 (long trough). Level today 6.2, 8hr trough. No change to dose. Repeat 12 hr trough tomorrow Complexity of management:Medium. Contributing factors: renal insufficiency, hyperkalemia  Hematology: Hbg 6.6 down from 7.4. Dilutional. Plan for RBC x 1 today followed by lasix.   Cardiorespiratory: EKG, no peak T waves. Stable on RA.  SBP 130s-140s. No antihypertension medication.   GI/Nutrition: Low K diet. Nutrition consult, to review low K diet.   Endocrine: DM type 2. Lantus 45 units. Novolog SSI  Fluid/Electrolytes: Hyperkalemia, likely secondary to tacrolimus/RTA, EJ/dehydration. Received Ca, glucose, insulin, bicarb. Kayexalate 30 g x 1. K 5.8 decreased from 6.8 on admission. Repeat now K 5.9. Will repeat kayexalate 15 g now. Plan for RBC 1 unit and lasix IV. Hold bactrim. Renal consulted. Monitor K every q6 hrs. If K elevated on next check, renal advising to restart D5 with sodium bicarbonate.   Mg 1.5, ordered Mg 400 PO BID. Repeat Mg tomorrow.   : Monitor urine output.   Infectious disease: ppx: bactrim, valcyte and clotrimazole dayday. Hold bactrim due  to hyperkalemia.   Prophylaxis: DVT-ambulate  Disposition: 6B    Medical Decision Making: High  Subsequent visit 63890 (high level decision making)    PILLO/Fellow/Resident Provider: Ada Driver PA-C     Faculty: Tip Zhang M.D.    __________________________________________________________________  Transplant History: Admitted 3/23/2017 for hyperkalemia.   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    3/4/2017 (Liver), Postoperative day: 20     Interval History: History is obtained from the patient  Overnight events: No c/o. +BM after kayexalate. Denies chest pain or shortness of breath. Urine output unchanged in color or amount. Has voided several times, asked to now save UO.     ROS:   A 10-point review of systems was negative except as noted above.    Curent Meds:    sodium chloride (PF)  3 mL Intracatheter Q8H     tacrolimus  6 mg Oral BID IS     valGANciclovir  450 mg Oral Daily     sulfamethoxazole-trimethoprim  1 tablet Oral Daily     senna-docusate  2 tablet Oral BID     omeprazole  20 mg Oral BID AC     mycophenolate  1,000 mg Oral BID     multivitamin, therapeutic with minerals  1 tablet Oral Daily     megestrol  400 mg Oral BID     lidocaine  1 patch Transdermal Q24H     lactobacillus rhamnosus (GG)  1 capsule Oral BID     insulin aspart  1-7 Units Subcutaneous At Bedtime     insulin aspart  1-10 Units Subcutaneous TID AC     clotrimazole  1 Beatrice Buccal TID     aspirin  325 mg Oral or Feeding Tube Daily     lidocaine   Transdermal Q24h     lidocaine   Transdermal Q8H     insulin glargine  45 Units Subcutaneous QAM AC     gabapentin  300 mg Oral TID       Physical Exam:     Admit Weight: 106 kg (233 lb 9.6 oz) (standing scale 2)    Current Vitals:   /80 (BP Location: Left arm)  Temp 97.9  F (36.6  C) (Oral)  Resp 18  Ht 1.829 m (6')  Wt 106 kg (233 lb 9.6 oz)  SpO2 97%  BMI 31.68 kg/m2         Vital sign ranges:    Temp:  [97.5  F (36.4   C)-98.5  F (36.9  C)] 97.9  F (36.6  C)  Pulse:  [86] 86  Heart Rate:  [74-82] 75  Resp:  [16-20] 18  BP: (106-157)/(64-92) 137/80  SpO2:  [96 %-100 %] 97 %  Patient Vitals for the past 24 hrs:   BP Temp Temp src Heart Rate Resp SpO2 Height Weight   03/24/17 0725 137/80 97.9  F (36.6  C) Oral 75 18 97 % - -   03/24/17 0310 131/86 97.5  F (36.4  C) Oral 80 16 100 % - -   03/23/17 2317 147/81 97.8  F (36.6  C) Oral 79 16 100 % - -   03/23/17 1950 (!) 145/92 98.5  F (36.9  C) Oral 82 16 99 % 1.829 m (6') 106 kg (233 lb 9.6 oz)     General Appearance: in no apparent distress.   Skin: normal, dry  Heart: regular rate and rhythm  Lungs: clear to auscultation  Abdomen:soft, non tender, +fluid wave. The wound is Healing well.  : no vazquez.   Extremities: edema: absent.  Neurologic: awake, alert and oriented. Tremor absent.      Data:   CMP  Recent Labs  Lab 03/24/17  0556 03/24/17  0039  03/23/17  2054  03/23/17  1130     --   --  135  < > 134   POTASSIUM 5.8* 6.0*  < > 6.8*  < > 6.8*   CHLORIDE 99  --   --  101  < > 101   CO2 27  --   --  25  < > 25   *  --   --  106*  < > 122*   BUN 44*  --   --  47*  < > 46*   CR 1.58*  --   --  1.82*  < > 1.56*   GFRESTIMATED 46*  --   --  39*  < > 47*   GFRESTBLACK 56*  --   --  47*  < > 57*   JACOB 8.4*  --   --  8.6  < > 8.6   MAG 1.5*  --   --  1.4*  --  1.7   PHOS 5.0*  --   --  4.8*  --  5.2*   ALBUMIN 2.0*  --   --   --   --  2.3*   BILITOTAL 1.7*  --   --   --   --  1.5*   ALKPHOS 144  --   --   --   --  162*   AST 19  --   --   --   --  29   ALT 15  --   --   --   --  19   < > = values in this interval not displayed.  CBC  Recent Labs  Lab 03/24/17  0556 03/23/17  2054   HGB 6.6* 7.4*   WBC 4.1 5.2   * 157     COAGSNo lab results found in last 7 days.    Invalid input(s): XA   Urinalysis  Recent Labs   Lab Test  03/16/17   1010  03/03/17   1405   04/11/16   1345  02/08/16   1234   COLOR  Sun  Dark Yellow   < >  Yellow  Yellow   APPEARANCE  Slightly Cloudy   Slightly Cloudy   < >  Clear  Clear   URINEGLC  Negative  Negative   < >  30*  >1000*   URINEBILI  Negative  Moderate   This is an unconfirmed screening test result. A positive result may be false.  *   < >  Negative  Negative   URINEKETONE  Negative  Negative   < >  Negative  Negative   SG  1.012  1.010   < >  1.016  1.020   UBLD  Negative  Negative   < >  Small*  Small*   URINEPH  5.0  5.0   < >  5.0  5.0   PROTEIN  Negative  Negative   < >  30*  30*   NITRITE  Negative  Negative   < >  Negative  Negative   LEUKEST  Negative  Negative   < >  Negative  Negative   RBCU  0  1   < >  1  2   WBCU  5*  0   < >  1  1   UTPG   --    --    --   0.41*  0.33*    < > = values in this interval not displayed.     Virology:  Hepatitis C Antibody   Date Value Ref Range Status   03/03/2017  NR Final    Nonreactive   Assay performance characteristics have not been established for newborns,   infants, and children

## 2017-03-24 NOTE — PROGRESS NOTES
Pt arrived from [facility] at this time. Valuables at bedside and oriented to room.    20:00: Surgery cross-cover notified of patient's potassium of 6.8. MD will order kayexalate.

## 2017-03-25 LAB
ABO + RH BLD: NORMAL
ABO + RH BLD: NORMAL
ALBUMIN SERPL-MCNC: 1.9 G/DL (ref 3.4–5)
ALP SERPL-CCNC: 136 U/L (ref 40–150)
ALT SERPL W P-5'-P-CCNC: 13 U/L (ref 0–70)
ANION GAP SERPL CALCULATED.3IONS-SCNC: 9 MMOL/L (ref 3–14)
AST SERPL W P-5'-P-CCNC: 17 U/L (ref 0–45)
BASOPHILS # BLD AUTO: 0 10E9/L (ref 0–0.2)
BASOPHILS NFR BLD AUTO: 0.5 %
BILIRUB DIRECT SERPL-MCNC: 1 MG/DL (ref 0–0.2)
BILIRUB SERPL-MCNC: 1.7 MG/DL (ref 0.2–1.3)
BLD GP AB SCN SERPL QL: NORMAL
BLD PROD TYP BPU: NORMAL
BLD PROD TYP BPU: NORMAL
BLD UNIT ID BPU: 0
BLOOD BANK CMNT PATIENT-IMP: NORMAL
BLOOD PRODUCT CODE: NORMAL
BPU ID: NORMAL
BUN SERPL-MCNC: 39 MG/DL (ref 7–30)
CALCIUM SERPL-MCNC: 7.9 MG/DL (ref 8.5–10.1)
CHLORIDE SERPL-SCNC: 96 MMOL/L (ref 94–109)
CO2 SERPL-SCNC: 28 MMOL/L (ref 20–32)
CREAT SERPL-MCNC: 1.45 MG/DL (ref 0.66–1.25)
DIFFERENTIAL METHOD BLD: ABNORMAL
EOSINOPHIL # BLD AUTO: 0.1 10E9/L (ref 0–0.7)
EOSINOPHIL NFR BLD AUTO: 1.9 %
ERYTHROCYTE [DISTWIDTH] IN BLOOD BY AUTOMATED COUNT: 17.1 % (ref 10–15)
GFR SERPL CREATININE-BSD FRML MDRD: 51 ML/MIN/1.7M2
GLUCOSE SERPL-MCNC: 195 MG/DL (ref 70–99)
HCT VFR BLD AUTO: 21 % (ref 40–53)
HGB BLD-MCNC: 7 G/DL (ref 13.3–17.7)
IMM GRANULOCYTES # BLD: 0 10E9/L (ref 0–0.4)
IMM GRANULOCYTES NFR BLD: 0.7 %
LYMPHOCYTES # BLD AUTO: 0.4 10E9/L (ref 0.8–5.3)
LYMPHOCYTES NFR BLD AUTO: 8.7 %
MAGNESIUM SERPL-MCNC: 1.5 MG/DL (ref 1.6–2.3)
MCH RBC QN AUTO: 31.4 PG (ref 26.5–33)
MCHC RBC AUTO-ENTMCNC: 33.3 G/DL (ref 31.5–36.5)
MCV RBC AUTO: 94 FL (ref 78–100)
MONOCYTES # BLD AUTO: 0.1 10E9/L (ref 0–1.3)
MONOCYTES NFR BLD AUTO: 3.2 %
NEUTROPHILS # BLD AUTO: 3.5 10E9/L (ref 1.6–8.3)
NEUTROPHILS NFR BLD AUTO: 85 %
NRBC # BLD AUTO: 0 10*3/UL
NRBC BLD AUTO-RTO: 0 /100
NUM BPU REQUESTED: 2
PHOSPHATE SERPL-MCNC: 4.9 MG/DL (ref 2.5–4.5)
PLATELET # BLD AUTO: 134 10E9/L (ref 150–450)
POTASSIUM SERPL-SCNC: 5.3 MMOL/L (ref 3.4–5.3)
POTASSIUM SERPL-SCNC: 5.6 MMOL/L (ref 3.4–5.3)
POTASSIUM SERPL-SCNC: 5.9 MMOL/L (ref 3.4–5.3)
PROT SERPL-MCNC: 4.9 G/DL (ref 6.8–8.8)
RBC # BLD AUTO: 2.23 10E12/L (ref 4.4–5.9)
SODIUM SERPL-SCNC: 133 MMOL/L (ref 133–144)
SPECIMEN EXP DATE BLD: NORMAL
TACROLIMUS BLD-MCNC: 8.2 UG/L (ref 5–15)
TME LAST DOSE: NORMAL H
TRANSFUSION STATUS PATIENT QL: NORMAL
TRANSFUSION STATUS PATIENT QL: NORMAL
WBC # BLD AUTO: 4.1 10E9/L (ref 4–11)

## 2017-03-25 PROCEDURE — 36415 COLL VENOUS BLD VENIPUNCTURE: CPT | Performed by: TRANSPLANT SURGERY

## 2017-03-25 PROCEDURE — 25000131 ZZH RX MED GY IP 250 OP 636 PS 637: Performed by: STUDENT IN AN ORGANIZED HEALTH CARE EDUCATION/TRAINING PROGRAM

## 2017-03-25 PROCEDURE — 80197 ASSAY OF TACROLIMUS: CPT | Performed by: PHYSICIAN ASSISTANT

## 2017-03-25 PROCEDURE — 25000132 ZZH RX MED GY IP 250 OP 250 PS 637: Performed by: PHYSICIAN ASSISTANT

## 2017-03-25 PROCEDURE — 36415 COLL VENOUS BLD VENIPUNCTURE: CPT | Performed by: PHYSICIAN ASSISTANT

## 2017-03-25 PROCEDURE — 85025 COMPLETE CBC W/AUTO DIFF WBC: CPT | Performed by: STUDENT IN AN ORGANIZED HEALTH CARE EDUCATION/TRAINING PROGRAM

## 2017-03-25 PROCEDURE — 25900017 H RX MED GY IP 259 OP 259 PS 637: Performed by: STUDENT IN AN ORGANIZED HEALTH CARE EDUCATION/TRAINING PROGRAM

## 2017-03-25 PROCEDURE — 80048 BASIC METABOLIC PNL TOTAL CA: CPT | Performed by: STUDENT IN AN ORGANIZED HEALTH CARE EDUCATION/TRAINING PROGRAM

## 2017-03-25 PROCEDURE — 25000132 ZZH RX MED GY IP 250 OP 250 PS 637: Performed by: STUDENT IN AN ORGANIZED HEALTH CARE EDUCATION/TRAINING PROGRAM

## 2017-03-25 PROCEDURE — 80076 HEPATIC FUNCTION PANEL: CPT | Performed by: STUDENT IN AN ORGANIZED HEALTH CARE EDUCATION/TRAINING PROGRAM

## 2017-03-25 PROCEDURE — 00000146 ZZHCL STATISTIC GLUCOSE BY METER IP

## 2017-03-25 PROCEDURE — 25000128 H RX IP 250 OP 636: Performed by: TRANSPLANT SURGERY

## 2017-03-25 PROCEDURE — 12000006 ZZH R&B IMCU INTERMEDIATE UMMC

## 2017-03-25 PROCEDURE — 84132 ASSAY OF SERUM POTASSIUM: CPT | Performed by: TRANSPLANT SURGERY

## 2017-03-25 PROCEDURE — P9016 RBC LEUKOCYTES REDUCED: HCPCS | Performed by: NURSE PRACTITIONER

## 2017-03-25 PROCEDURE — 83735 ASSAY OF MAGNESIUM: CPT | Performed by: STUDENT IN AN ORGANIZED HEALTH CARE EDUCATION/TRAINING PROGRAM

## 2017-03-25 PROCEDURE — 84100 ASSAY OF PHOSPHORUS: CPT | Performed by: STUDENT IN AN ORGANIZED HEALTH CARE EDUCATION/TRAINING PROGRAM

## 2017-03-25 RX ORDER — MAGNESIUM SULFATE HEPTAHYDRATE 40 MG/ML
4 INJECTION, SOLUTION INTRAVENOUS ONCE
Status: COMPLETED | OUTPATIENT
Start: 2017-03-25 | End: 2017-03-25

## 2017-03-25 RX ORDER — FUROSEMIDE 10 MG/ML
40 INJECTION INTRAMUSCULAR; INTRAVENOUS ONCE
Status: COMPLETED | OUTPATIENT
Start: 2017-03-25 | End: 2017-03-25

## 2017-03-25 RX ADMIN — ASPIRIN 325 MG ORAL TABLET 325 MG: 325 PILL ORAL at 11:06

## 2017-03-25 RX ADMIN — Medication 1 CAPSULE: at 08:28

## 2017-03-25 RX ADMIN — CLOTRIMAZOLE 1 TROCHE: 10 LOZENGE ORAL at 13:03

## 2017-03-25 RX ADMIN — INSULIN ASPART 1 UNITS: 100 INJECTION, SOLUTION INTRAVENOUS; SUBCUTANEOUS at 18:03

## 2017-03-25 RX ADMIN — Medication 1 CAPSULE: at 19:39

## 2017-03-25 RX ADMIN — MAGNESIUM SULFATE IN WATER 4 G: 40 INJECTION, SOLUTION INTRAVENOUS at 09:02

## 2017-03-25 RX ADMIN — MULTIPLE VITAMINS W/ MINERALS TAB 1 TABLET: TAB at 11:06

## 2017-03-25 RX ADMIN — FUROSEMIDE 40 MG: 10 INJECTION, SOLUTION INTRAVENOUS at 13:08

## 2017-03-25 RX ADMIN — MYCOPHENOLATE MOFETIL 1000 MG: 250 CAPSULE ORAL at 08:34

## 2017-03-25 RX ADMIN — MAGNESIUM OXIDE TAB 400 MG (241.3 MG ELEMENTAL MG) 400 MG: 400 (241.3 MG) TAB at 11:06

## 2017-03-25 RX ADMIN — VALGANCICLOVIR HYDROCHLORIDE 450 MG: 450 TABLET ORAL at 08:27

## 2017-03-25 RX ADMIN — SENNOSIDES AND DOCUSATE SODIUM 2 TABLET: 8.6; 5 TABLET ORAL at 08:29

## 2017-03-25 RX ADMIN — MYCOPHENOLATE MOFETIL 1000 MG: 250 CAPSULE ORAL at 19:38

## 2017-03-25 RX ADMIN — GABAPENTIN 300 MG: 300 CAPSULE ORAL at 13:03

## 2017-03-25 RX ADMIN — OMEPRAZOLE 20 MG: 20 CAPSULE, DELAYED RELEASE ORAL at 08:26

## 2017-03-25 RX ADMIN — TACROLIMUS 6 MG: 5 CAPSULE ORAL at 18:05

## 2017-03-25 RX ADMIN — MEGESTROL ACETATE 400 MG: 40 SUSPENSION ORAL at 08:36

## 2017-03-25 RX ADMIN — INSULIN ASPART 1 UNITS: 100 INJECTION, SOLUTION INTRAVENOUS; SUBCUTANEOUS at 13:00

## 2017-03-25 RX ADMIN — INSULIN GLARGINE 45 UNITS: 100 INJECTION, SOLUTION SUBCUTANEOUS at 08:45

## 2017-03-25 RX ADMIN — MAGNESIUM OXIDE TAB 400 MG (241.3 MG ELEMENTAL MG) 400 MG: 400 (241.3 MG) TAB at 19:39

## 2017-03-25 RX ADMIN — MEGESTROL ACETATE 400 MG: 40 SUSPENSION ORAL at 21:02

## 2017-03-25 RX ADMIN — CLOTRIMAZOLE 1 TROCHE: 10 LOZENGE ORAL at 19:40

## 2017-03-25 RX ADMIN — CYCLOBENZAPRINE HYDROCHLORIDE 10 MG: 10 TABLET, FILM COATED ORAL at 08:28

## 2017-03-25 RX ADMIN — INSULIN ASPART 2 UNITS: 100 INJECTION, SOLUTION INTRAVENOUS; SUBCUTANEOUS at 08:39

## 2017-03-25 RX ADMIN — OXYCODONE HYDROCHLORIDE 10 MG: 5 TABLET ORAL at 13:03

## 2017-03-25 RX ADMIN — GABAPENTIN 300 MG: 300 CAPSULE ORAL at 08:27

## 2017-03-25 RX ADMIN — OXYCODONE HYDROCHLORIDE 10 MG: 5 TABLET ORAL at 08:27

## 2017-03-25 RX ADMIN — CYCLOBENZAPRINE HYDROCHLORIDE 10 MG: 10 TABLET, FILM COATED ORAL at 21:15

## 2017-03-25 RX ADMIN — CLOTRIMAZOLE 1 TROCHE: 10 LOZENGE ORAL at 08:37

## 2017-03-25 RX ADMIN — TACROLIMUS 6 MG: 5 CAPSULE ORAL at 08:34

## 2017-03-25 RX ADMIN — OMEPRAZOLE 20 MG: 20 CAPSULE, DELAYED RELEASE ORAL at 16:25

## 2017-03-25 RX ADMIN — SENNOSIDES AND DOCUSATE SODIUM 2 TABLET: 8.6; 5 TABLET ORAL at 19:39

## 2017-03-25 RX ADMIN — GABAPENTIN 300 MG: 300 CAPSULE ORAL at 19:38

## 2017-03-25 RX ADMIN — OXYCODONE HYDROCHLORIDE 10 MG: 5 TABLET ORAL at 21:15

## 2017-03-25 RX ADMIN — LIDOCAINE 1 PATCH: 50 PATCH TOPICAL at 12:59

## 2017-03-25 ASSESSMENT — PAIN DESCRIPTION - DESCRIPTORS: DESCRIPTORS: ACHING

## 2017-03-25 NOTE — PLAN OF CARE
Problem: Goal Outcome Summary  Goal: Goal Outcome Summary  Patient alert and oriented x's 4. Vitals stable, on RA, in NSR without ectomy, afebrile. Latest potassium level 5.6. D5 sodium bicarb infusing continuously. Mid back pain comfortably managed with oxycodone and flexeril. Has been sleeping well most of the night. Tolerating 2 gram K diet. Voiding adequately. No BM. Up independently in the room, refused bed alarm. Will continue to monitor.

## 2017-03-25 NOTE — PROGRESS NOTES
Nephrology Progress Note  03/25/2017         ASSESSMENT AND RECOMMENDATIONS:   1. Hyperkalemia- K 5.3 today. Improving and likely multifactorial: renal insufficiency, dietary indiscretion, CNI, hypovolemia.   - Would recommend formal dietary consult to review complex diet including K restriction/DM and Renal diet.   - Bactrim on hold  - Dietary consult completed  - glu control good     2. EJ - Cr trending down.  Etiology likely pre renal given improvement with IVF in setting of CNI ( level 12.1 on 3/20) and recent initiation of Furosemide on 3/15/17. UA bland.  Tac 6.2  - Monitor renal function for improvement, unlikely to return to baseline given IS  - Avoid Nephrotoxins if possible  - Renal dose medications  - Daily chemistries     3. Volume status - euvolemic. No weight since admission  - Daily standing weights  - I/O  - ok to restart Furosemide     4. Liver transplant 3/4/17, IS: Cellcept/Tacrolimus. Tac level 9.8 3/23/17. Additionally on Bactrim, Valcyte, Mycelex. Transplant managing  - Bactrim on hold per Transplant     5. Anemia - Hgb today 6.6, down from 7.2 yesterday. No signs of bleeding. May be dilutional given IVF. Hgb running in the 7-8 range. Asymptomatic. On ASA, PPI.   - being transfused    6.  Low alb- not eating much.  On Megace.     Recommendations were communicated to primary team via progress note     Char Jackson MD     [phone]      Interval History :   In the last 24 hours Camacho Bhagat has been transfused and received IVF.  Feels well, as he did when admitted    Review of Systems:   Not SOB, no CP, minimal appetite  No probem with urination    Physical Exam:   I/O last 3 completed shifts:  In: 2840 [P.O.:1420; I.V.:1420]  Out: 1880 [Urine:1880]   /86 (BP Location: Left arm)  Temp 98.2  F (36.8  C) (Oral)  Resp 12  Ht 1.829 m (6')  Wt 106.1 kg (233 lb 14.5 oz)  SpO2 99%  BMI 31.72 kg/m2     GENERAL APPEARANCE:   EYES:  No scleral icterus, pupils equal  HENT: mouth without  ulcers or lesions  PULM: lungs  Clear to auscultation  CV: regular rhythm, normal rate, no rub     No edema  GI: soft, distended.  Healing Mecedes incision    NEURO: alert      Labs:   All labs reviewed by me  Electrolytes/Renal -   Recent Labs   Lab Test  03/25/17   0646  03/25/17   0210  03/24/17 2020 03/24/17   0556   03/23/17 2054   NA  133   --    --    --   134   --   135   POTASSIUM  5.3  5.6*  6.2*   < >  5.8*   < >  6.8*   CHLORIDE  96   --    --    --   99   --   101   CO2  28   --    --    --   27   --   25   BUN  39*   --    --    --   44*   --   47*   CR  1.45*   --    --    --   1.58*   --   1.82*   GLC  195*   --    --    --   147*   --   106*   JACOB  7.9*   --    --    --   8.4*   --   8.6   MAG  1.5*   --    --    --   1.5*   --   1.4*   PHOS  4.9*   --    --    --   5.0*   --   4.8*    < > = values in this interval not displayed.       CBC -   Recent Labs   Lab Test  03/25/17   0646  03/24/17   0556  03/23/17 2054   WBC  4.1  4.1  5.2   HGB  7.0*  6.6*  7.4*   PLT  134*  147*  157       LFTs -   Recent Labs   Lab Test  03/25/17   0646  03/24/17   0556  03/23/17   1130   ALKPHOS  136  144  162*   BILITOTAL  1.7*  1.7*  1.5*   ALT  13  15  19   AST  17  19  29   PROTTOTAL  4.9*  5.1*  5.9*   ALBUMIN  1.9*  2.0*  2.3*       Iron Panel -   Recent Labs   Lab Test  02/08/16   1144   IRON  93   IRONSAT  41         Imaging:       Current Medications:    furosemide  40 mg Intravenous Once     magnesium oxide  400 mg Oral BID     sodium chloride (PF)  3 mL Intracatheter Q8H     tacrolimus  6 mg Oral BID IS     valGANciclovir  450 mg Oral Daily     senna-docusate  2 tablet Oral BID     omeprazole  20 mg Oral BID AC     mycophenolate  1,000 mg Oral BID     multivitamin, therapeutic with minerals  1 tablet Oral Daily     megestrol  400 mg Oral BID     lidocaine  1 patch Transdermal Q24H     lactobacillus rhamnosus (GG)  1 capsule Oral BID     insulin aspart  1-7 Units Subcutaneous At Bedtime     insulin  aspart  1-10 Units Subcutaneous TID AC     clotrimazole  1 Beatrice Buccal TID     aspirin  325 mg Oral or Feeding Tube Daily     lidocaine   Transdermal Q24h     lidocaine   Transdermal Q8H     insulin glargine  45 Units Subcutaneous QAM AC     gabapentin  300 mg Oral TID        Char Jackson MD

## 2017-03-25 NOTE — PROGRESS NOTES
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 S/p DD OLT on 3/4/2017. EJ post transplant due to HRS, IV contrast (CTA to eval for HA). Creatinine increased to 3.3 post transplant. He did not require HD. Creatinine decreased to 1.2 on 3/10. TABITHA drain was accidentally removed. Patient was placed on lasix 80 mg PO (3/15) to reduce ascites. On routine labs. Elevated potassium outpatient, not improved with lasix. Creatinine increased from 1.2 up to 1.8. Admitted for further treatment.    Graft function:good, LFTs decreased.   US abd mod ascites.   Immunosuppression management: MMF 1 gm BID and tac 6 mg BID.  Tac 3/20 tac level 12.1, 3/23 9.8 (long trough). Level today 6.2, 8hr trough. No change to dose. Repeat 12 hr trough tomorrow Complexity of management:Medium. Contributing factors: renal insufficiency, hyperkalemia  Hematology: Hbg 7 after 1 Unit. .  Will give 1 more unit PRB  Cardiorespiratory: EKG, no peak T waves. Stable on RA.  No antihypertension medication.   GI/Nutrition: Low K diet. Nutrition consult, to review low K diet.   Endocrine: DM type 2. Lantus 45 units. Novolog SSI  Fluid/Electrolytes: Hyperkalemia, likely secondary to tacrolimus/RTA, EJ/dehydration. Required bicarbonate ovrnight with lasix.  Will dc bicarbonate this AM.  Will give blood.  Will replace Mg.  Check K tonight.  : Monitor urine output.   Infectious disease: ppx: bactrim, valcyte and clotrimazole dayday. Hold bactrim due to hyperkalemia.   Prophylaxis: DVT-ambulate  Disposition: 6B    Medical Decision Making: High  Subsequent visit 55888 (high level decision making)    PILLO/Fellow/Resident Provider: Ada Driver PA-C     Faculty: Tip Zhang M.D.    __________________________________________________________________  Transplant History: Admitted 3/23/2017 for hyperkalemia.   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    3/4/2017 (Liver), Postoperative day: 21     Interval History: History is obtained from the patient  Overnight events: No c/o. +BM after kayexalate. Denies chest pain or shortness of breath. Urine output unchanged in color or amount. Has voided several times, asked to now save UO.     ROS:   A 10-point review of systems was negative except as noted above.    Curent Meds:    magnesium oxide  400 mg Oral BID     sodium chloride (PF)  3 mL Intracatheter Q8H     tacrolimus  6 mg Oral BID IS     valGANciclovir  450 mg Oral Daily     senna-docusate  2 tablet Oral BID     omeprazole  20 mg Oral BID AC     mycophenolate  1,000 mg Oral BID     multivitamin, therapeutic with minerals  1 tablet Oral Daily     megestrol  400 mg Oral BID     lidocaine  1 patch Transdermal Q24H     lactobacillus rhamnosus (GG)  1 capsule Oral BID     insulin aspart  1-7 Units Subcutaneous At Bedtime     insulin aspart  1-10 Units Subcutaneous TID AC     clotrimazole  1 Beatrice Buccal TID     aspirin  325 mg Oral or Feeding Tube Daily     lidocaine   Transdermal Q24h     lidocaine   Transdermal Q8H     insulin glargine  45 Units Subcutaneous QAM AC     gabapentin  300 mg Oral TID       Physical Exam:     Admit Weight: 106 kg (233 lb 9.6 oz) (standing scale 2)    Current Vitals:   /84 (BP Location: Left arm)  Temp 97.9  F (36.6  C) (Oral)  Resp 15  Ht 1.829 m (6')  Wt 106.1 kg (233 lb 14.5 oz)  SpO2 96%  BMI 31.72 kg/m2         Vital sign ranges:    Temp:  [97.8  F (36.6  C)-98.4  F (36.9  C)] 97.9  F (36.6  C)  Heart Rate:  [72-81] 76  Resp:  [11-16] 15  BP: (128-144)/(77-89) 131/84  SpO2:  [95 %-100 %] 96 %  Patient Vitals for the past 24 hrs:   BP Temp Temp src Heart Rate Resp SpO2 Weight   03/25/17 0325 131/84 97.9  F (36.6  C) Oral 76 15 96 % -   03/25/17 0200 - - - - - - 106.1 kg (233 lb 14.5 oz)   03/24/17 2320 136/77 97.9  F (36.6  C) Oral 75 15 95 % -    03/24/17 1908 134/82 98.1  F (36.7  C) - 76 14 96 % -   03/24/17 1835 140/85 98.4  F (36.9  C) Oral 78 14 - -   03/24/17 1742 144/87 98.3  F (36.8  C) Oral 81 12 96 % -   03/24/17 1640 134/79 97.8  F (36.6  C) Oral 74 14 97 % -   03/24/17 1623 128/82 97.9  F (36.6  C) Oral 72 11 98 % -   03/24/17 1607 131/89 97.8  F (36.6  C) Oral 76 16 100 % -   03/24/17 1058 - 97.9  F (36.6  C) Oral 74 16 - -     General Appearance: in no apparent distress.   Skin: normal, dry  Heart: regular rate and rhythm  Lungs: clear to auscultation  Abdomen:soft, non tender, +fluid wave. The wound is Healing well.  : no vazquez.   Extremities: edema: absent.  Neurologic: awake, alert and oriented. Tremor absent.      Data:   CMP  Recent Labs  Lab 03/25/17  0646 03/25/17  0210  03/24/17  0556     --   --  134   POTASSIUM 5.3 5.6*  < > 5.8*   CHLORIDE 96  --   --  99   CO2 28  --   --  27   *  --   --  147*   BUN 39*  --   --  44*   CR 1.45*  --   --  1.58*   GFRESTIMATED 51*  --   --  46*   GFRESTBLACK 62  --   --  56*   JACOB 7.9*  --   --  8.4*   MAG 1.5*  --   --  1.5*   PHOS 4.9*  --   --  5.0*   ALBUMIN 1.9*  --   --  2.0*   BILITOTAL 1.7*  --   --  1.7*   ALKPHOS 136  --   --  144   AST 17  --   --  19   ALT 13  --   --  15   < > = values in this interval not displayed.  CBC    Recent Labs  Lab 03/25/17  0646 03/24/17  0556   HGB 7.0* 6.6*   WBC 4.1 4.1   * 147*     COAGSNo lab results found in last 7 days.    Invalid input(s): XA   Urinalysis  Recent Labs   Lab Test  03/24/17   1315  03/16/17   1010   04/11/16   1345  02/08/16   1234   COLOR  Yellow  Sun   < >  Yellow  Yellow   APPEARANCE  Clear  Slightly Cloudy   < >  Clear  Clear   URINEGLC  Negative  Negative   < >  30*  >1000*   URINEBILI  Negative  Negative   < >  Negative  Negative   URINEKETONE  Negative  Negative   < >  Negative  Negative   SG  1.009  1.012   < >  1.016  1.020   UBLD  Negative  Negative   < >  Small*  Small*   URINEPH  5.5  5.0   < >  5.0   5.0   PROTEIN  Negative  Negative   < >  30*  30*   NITRITE  Negative  Negative   < >  Negative  Negative   LEUKEST  Negative  Negative   < >  Negative  Negative   RBCU  <1  0   < >  1  2   WBCU  1  5*   < >  1  1   UTPG   --    --    --   0.41*  0.33*    < > = values in this interval not displayed.     Virology:  Hepatitis C Antibody   Date Value Ref Range Status   03/03/2017  NR Final    Nonreactive   Assay performance characteristics have not been established for newborns,   infants, and children

## 2017-03-25 NOTE — PROGRESS NOTES
Reason for Assessment:  Nutrition education regarding low K+ diet  Diet History:  Pt eats several high K+ foods at baseline: milk, meat, fruits/veggie: tomato, potato; beans and lentils, etc. Pt does 99% of his own cooking at home vs restaurant/take-out.   Nutrition Diagnosis:  Food- and nutrition-related knowledge deficit r/t no previous knowledge of low K+ diet   Interventions:  Provided instruction on high K+ foods vs low and importance of maintaining normal K+ levels.   Provided handouts on high, medium, and low K+ food list  Goals:   Patient will verbalize understanding of high K+ foods and importance of limiting/monitong intake closely   Return demonstration: pt listed at least 4 high K+ foods he currently eats and needs to limit: milk, beans, fish, meat, tomato  Follow-up:    Patient to ask any further nutrition-related questions before discharge.  In addition, pt may request outpatient RD appointment.     Yudelka Anne RD, LD  Unit Pager: [phone]

## 2017-03-25 NOTE — PROVIDER NOTIFICATION
Surgery cross-cover notified of potassium 6.2. D5 sodium bicarb ordered, 40 mg's lasix given. Next potassium level at 0200. Will continue to monitor.

## 2017-03-25 NOTE — PROVIDER NOTIFICATION
Text page sent to Dr. Cross to inquire if another potassium recheck is needed this evening. K+ level 5.3 this AM, 1 unit PRBC's and 40 mg IV lasix given. Rec'd verbal order from Dr. Cross to recheck potassium at 2000 tonight.

## 2017-03-26 VITALS
SYSTOLIC BLOOD PRESSURE: 141 MMHG | HEIGHT: 72 IN | RESPIRATION RATE: 16 BRPM | OXYGEN SATURATION: 98 % | DIASTOLIC BLOOD PRESSURE: 90 MMHG | WEIGHT: 236.33 LBS | BODY MASS INDEX: 32.01 KG/M2 | TEMPERATURE: 98 F

## 2017-03-26 LAB
ALBUMIN SERPL-MCNC: 1.9 G/DL (ref 3.4–5)
ALP SERPL-CCNC: 126 U/L (ref 40–150)
ALT SERPL W P-5'-P-CCNC: 14 U/L (ref 0–70)
ANION GAP SERPL CALCULATED.3IONS-SCNC: 8 MMOL/L (ref 3–14)
AST SERPL W P-5'-P-CCNC: 14 U/L (ref 0–45)
BASOPHILS # BLD AUTO: 0 10E9/L (ref 0–0.2)
BASOPHILS NFR BLD AUTO: 0.4 %
BILIRUB DIRECT SERPL-MCNC: 0.7 MG/DL (ref 0–0.2)
BILIRUB SERPL-MCNC: 1.6 MG/DL (ref 0.2–1.3)
BUN SERPL-MCNC: 36 MG/DL (ref 7–30)
CALCIUM SERPL-MCNC: 8 MG/DL (ref 8.5–10.1)
CHLORIDE SERPL-SCNC: 98 MMOL/L (ref 94–109)
CO2 SERPL-SCNC: 27 MMOL/L (ref 20–32)
CREAT SERPL-MCNC: 1.36 MG/DL (ref 0.66–1.25)
DIFFERENTIAL METHOD BLD: ABNORMAL
EOSINOPHIL # BLD AUTO: 0.1 10E9/L (ref 0–0.7)
EOSINOPHIL NFR BLD AUTO: 2.1 %
ERYTHROCYTE [DISTWIDTH] IN BLOOD BY AUTOMATED COUNT: 17.2 % (ref 10–15)
GFR SERPL CREATININE-BSD FRML MDRD: 55 ML/MIN/1.7M2
GLUCOSE SERPL-MCNC: 143 MG/DL (ref 70–99)
HCT VFR BLD AUTO: 24.2 % (ref 40–53)
HGB BLD-MCNC: 8.1 G/DL (ref 13.3–17.7)
IMM GRANULOCYTES # BLD: 0 10E9/L (ref 0–0.4)
IMM GRANULOCYTES NFR BLD: 0.4 %
LYMPHOCYTES # BLD AUTO: 0.3 10E9/L (ref 0.8–5.3)
LYMPHOCYTES NFR BLD AUTO: 4.9 %
MAGNESIUM SERPL-MCNC: 2.1 MG/DL (ref 1.6–2.3)
MCH RBC QN AUTO: 30.8 PG (ref 26.5–33)
MCHC RBC AUTO-ENTMCNC: 33.5 G/DL (ref 31.5–36.5)
MCV RBC AUTO: 92 FL (ref 78–100)
MONOCYTES # BLD AUTO: 0.3 10E9/L (ref 0–1.3)
MONOCYTES NFR BLD AUTO: 5.1 %
NEUTROPHILS # BLD AUTO: 4.5 10E9/L (ref 1.6–8.3)
NEUTROPHILS NFR BLD AUTO: 87.1 %
NRBC # BLD AUTO: 0 10*3/UL
NRBC BLD AUTO-RTO: 0 /100
PHOSPHATE SERPL-MCNC: 4.3 MG/DL (ref 2.5–4.5)
PLATELET # BLD AUTO: 139 10E9/L (ref 150–450)
POTASSIUM SERPL-SCNC: 4.8 MMOL/L (ref 3.4–5.3)
PROT SERPL-MCNC: 4.7 G/DL (ref 6.8–8.8)
RBC # BLD AUTO: 2.63 10E12/L (ref 4.4–5.9)
SODIUM SERPL-SCNC: 133 MMOL/L (ref 133–144)
WBC # BLD AUTO: 5.1 10E9/L (ref 4–11)

## 2017-03-26 PROCEDURE — 25900017 H RX MED GY IP 259 OP 259 PS 637: Performed by: STUDENT IN AN ORGANIZED HEALTH CARE EDUCATION/TRAINING PROGRAM

## 2017-03-26 PROCEDURE — 80048 BASIC METABOLIC PNL TOTAL CA: CPT | Performed by: STUDENT IN AN ORGANIZED HEALTH CARE EDUCATION/TRAINING PROGRAM

## 2017-03-26 PROCEDURE — 25000132 ZZH RX MED GY IP 250 OP 250 PS 637: Performed by: STUDENT IN AN ORGANIZED HEALTH CARE EDUCATION/TRAINING PROGRAM

## 2017-03-26 PROCEDURE — 25000131 ZZH RX MED GY IP 250 OP 636 PS 637: Performed by: STUDENT IN AN ORGANIZED HEALTH CARE EDUCATION/TRAINING PROGRAM

## 2017-03-26 PROCEDURE — 85025 COMPLETE CBC W/AUTO DIFF WBC: CPT | Performed by: STUDENT IN AN ORGANIZED HEALTH CARE EDUCATION/TRAINING PROGRAM

## 2017-03-26 PROCEDURE — 83735 ASSAY OF MAGNESIUM: CPT | Performed by: STUDENT IN AN ORGANIZED HEALTH CARE EDUCATION/TRAINING PROGRAM

## 2017-03-26 PROCEDURE — 36415 COLL VENOUS BLD VENIPUNCTURE: CPT | Performed by: STUDENT IN AN ORGANIZED HEALTH CARE EDUCATION/TRAINING PROGRAM

## 2017-03-26 PROCEDURE — 25000132 ZZH RX MED GY IP 250 OP 250 PS 637: Performed by: PHYSICIAN ASSISTANT

## 2017-03-26 PROCEDURE — 00000146 ZZHCL STATISTIC GLUCOSE BY METER IP

## 2017-03-26 PROCEDURE — 84100 ASSAY OF PHOSPHORUS: CPT | Performed by: STUDENT IN AN ORGANIZED HEALTH CARE EDUCATION/TRAINING PROGRAM

## 2017-03-26 PROCEDURE — 80076 HEPATIC FUNCTION PANEL: CPT | Performed by: STUDENT IN AN ORGANIZED HEALTH CARE EDUCATION/TRAINING PROGRAM

## 2017-03-26 RX ORDER — FUROSEMIDE 20 MG
40 TABLET ORAL DAILY
Qty: 30 TABLET | Refills: 3 | Status: SHIPPED | OUTPATIENT
Start: 2017-03-26 | End: 2017-05-11

## 2017-03-26 RX ORDER — MAGNESIUM OXIDE 400 MG/1
400 TABLET ORAL 2 TIMES DAILY
Qty: 7 TABLET | Refills: 1 | Status: ON HOLD | OUTPATIENT
Start: 2017-03-26 | End: 2017-09-08

## 2017-03-26 RX ADMIN — ASPIRIN 325 MG ORAL TABLET 325 MG: 325 PILL ORAL at 08:23

## 2017-03-26 RX ADMIN — MULTIPLE VITAMINS W/ MINERALS TAB 1 TABLET: TAB at 11:32

## 2017-03-26 RX ADMIN — LIDOCAINE 1 PATCH: 50 PATCH TOPICAL at 08:43

## 2017-03-26 RX ADMIN — CLOTRIMAZOLE 1 TROCHE: 10 LOZENGE ORAL at 08:37

## 2017-03-26 RX ADMIN — CLOTRIMAZOLE 1 TROCHE: 10 LOZENGE ORAL at 14:14

## 2017-03-26 RX ADMIN — MAGNESIUM OXIDE TAB 400 MG (241.3 MG ELEMENTAL MG) 400 MG: 400 (241.3 MG) TAB at 11:32

## 2017-03-26 RX ADMIN — Medication 1 CAPSULE: at 08:23

## 2017-03-26 RX ADMIN — MYCOPHENOLATE MOFETIL 1000 MG: 250 CAPSULE ORAL at 08:24

## 2017-03-26 RX ADMIN — CYCLOBENZAPRINE HYDROCHLORIDE 10 MG: 10 TABLET, FILM COATED ORAL at 11:44

## 2017-03-26 RX ADMIN — OMEPRAZOLE 20 MG: 20 CAPSULE, DELAYED RELEASE ORAL at 08:22

## 2017-03-26 RX ADMIN — INSULIN GLARGINE 45 UNITS: 100 INJECTION, SOLUTION SUBCUTANEOUS at 08:39

## 2017-03-26 RX ADMIN — TACROLIMUS 6 MG: 5 CAPSULE ORAL at 08:24

## 2017-03-26 RX ADMIN — GABAPENTIN 300 MG: 300 CAPSULE ORAL at 14:14

## 2017-03-26 RX ADMIN — MEGESTROL ACETATE 400 MG: 40 SUSPENSION ORAL at 08:25

## 2017-03-26 RX ADMIN — OXYCODONE HYDROCHLORIDE 10 MG: 5 TABLET ORAL at 11:44

## 2017-03-26 RX ADMIN — VALGANCICLOVIR HYDROCHLORIDE 450 MG: 450 TABLET ORAL at 08:23

## 2017-03-26 RX ADMIN — INSULIN ASPART 2 UNITS: 100 INJECTION, SOLUTION INTRAVENOUS; SUBCUTANEOUS at 12:05

## 2017-03-26 RX ADMIN — GABAPENTIN 300 MG: 300 CAPSULE ORAL at 08:22

## 2017-03-26 ASSESSMENT — PAIN DESCRIPTION - DESCRIPTORS
DESCRIPTORS: STABBING
DESCRIPTORS: STABBING;OTHER (COMMENT)

## 2017-03-26 NOTE — PROVIDER NOTIFICATION
"Camacho and I have gathered a list of questions needing to be addressed prior to discharge and written them in the \"Sticky Notes to Physicians\" section on epic. Paged Dr. Cross to ask him to review prior to writing discharge orders. I also paged to inquire if potassium should be checked again prior to discharge.   "

## 2017-03-26 NOTE — PROVIDER NOTIFICATION
Spoke with Dr. Cross at bedside, denies the need for potassium recheck prior to discharge. He removed staples and suture at bedside. We discussed Camacho' gabapentin as he was taking 600 mg QHS prior to admission. Per Dr. Cross, he should continue with the way it was ordered in the hospital (300 mg TID) and discuss with the MD managing his gabapentin at his next appointment. I also mentioned his home PO lasix dose, per Dr. Cross, he will decrease the dose in the discharge instructions.

## 2017-03-26 NOTE — PLAN OF CARE
Problem: Goal Outcome Summary  Goal: Goal Outcome Summary  Patient alert and oriented x's 4. Vitals stable, on RA, in NSR without ectopy, afebrile. Oxycodone and flexeril given for back pain. Latest potassium level 5.9 (text paged team, no new orders). Next potassium level pending. Voiding adequately. 2 BM's this shift. Tolerating diet. Independently up in room. Will continue to monitor.

## 2017-03-26 NOTE — DISCHARGE SUMMARY
[facility], [place]    Discharge Summary  Transplant Surgery    Date of Admission:  3/23/2017  Date of Discharge:  3/26/2017  Discharging Provider: Adonay Cross    Discharge Diagnoses   Active Problems:    Hyperkalemia      History of Present Illness   Camacho Bhagat is a 51 year old male with 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 S/p DD OLT on 3/4/2017. EJ post transplant due to HRS, IV contrast (CTA to eval for HA). Creatinine increased to 3.3 post transplant. He did not require HD. Creatinine decreased to 1.2 on 3/10. TABITHA drain was accidentally removed. Patient was placed on lasix 80 mg PO (3/15) to reduce ascites. On routine labs. Elevated potassium outpatient, not improved with lasix. Creatinine increased from 1.2 up to 1.8. Admitted for further treatment.    Hospital Course   Assessment & Plan: Camacho Bhagat is a 51 year old male with 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 S/p DD OLT on 3/4/2017. EJ post transplant due to HRS, IV contrast (CTA to eval for HA). Creatinine increased to 3.3 post transplant. He did not require HD. Creatinine decreased to 1.2 on 3/10. TABITHA drain was accidentally removed. Patient was placed on lasix 80 mg PO (3/15) to reduce ascites. On routine labs. Elevated potassium outpatient, not improved with lasix. Creatinine increased from 1.2 up to 1.8. Admitted for further treatment.     Graft function:good, LFTs decreased.   US abd mod ascites.   Immunosuppression management: MMF 1 gm BID and tac 6 mg BID.  Tac 3/20 tac level 12.1, 3/23 9.8 (long trough). Level today 6.2, 8hr trough. No change to dose. Repeat 12 hr trough tomorrow Complexity of management:Medium. Contributing factors: renal insufficiency, hyperkalemia  Hematology: Hbg 8.1 on discharge  Cardiorespiratory: EKG, no peak T waves. Stable on RA. No antihypertension medication.   GI/Nutrition: Low K diet. Nutrition consult, to review low K diet.   Endocrine: DM  type 2. Lantus 45 units. Novolog SSI  Fluid/Electrolytes: Hyperkalemia, likely secondary to tacrolimus/RTA, EJ/dehydration. Discharge K 4.8, Cr. 1.36, both improving..  : NO issues  Infectious disease: ppx: bactrim, valcyte and clotrimazole dayday. Hold bactrim due to hyperkalemia.   Disposition: home    Adonay Cross    Significant Results and Procedures   K 4.8  Cr. 1.36     Pending Results   These results will be followed up by Dr. Tran  Unresulted Labs Ordered in the Past 30 Days of this Admission     Date and Time Order Name Status Description    3/4/2017 1500 Fungus Culture, non-blood Preliminary           Code Status   Full Code    Primary Care Physician   Shay Kirkpatrick    Physical Exam   Temp: 98  F (36.7  C) Temp src: Oral BP: 131/88   Heart Rate: 77 Resp: 12 SpO2: 97 % O2 Device: None (Room air)    Vitals:    03/23/17 1950 03/25/17 0200 03/26/17 0002   Weight: 106 kg (233 lb 9.6 oz) 106.1 kg (233 lb 14.5 oz) 107.2 kg (236 lb 5.3 oz)     Vital Signs with Ranges  Temp:  [97.9  F (36.6  C)-98  F (36.7  C)] 98  F (36.7  C)  Heart Rate:  [69-81] 77  Resp:  [12-16] 12  BP: (130-156)/(80-90) 131/88  SpO2:  [95 %-97 %] 97 %  I/O last 3 completed shifts:  In: 2118.33 [P.O.:1280; I.V.:538.33]  Out: 1175 [Urine:1175]    Constitutional: awake, alert, cooperative, no apparent distress, and appears stated age  Eyes: Lids and lashes normal, pupils equal, round and reactive to light, extra ocular muscles intact, sclera clear, conjunctiva normal  Respiratory: No increased work of breathing, good air exchange, clear to auscultation bilaterally, no crackles or wheezing  Cardiovascular: Normal apical impulse, regular rate and rhythm, normal S1 and S2, no S3 or S4, and no murmur noted  GI: well healed scars, normal bowel sounds, soft, non-distended, non-tender, no masses palpated, no hepatosplenomegally  Skin: no bruising or bleeding, normal skin color, texture, turgor and no redness, warmth, or  swelling  Musculoskeletal: There is no redness, warmth, or swelling of the joints.  Full range of motion noted.  Motor strength is 5 out of 5 all extremities bilaterally.  Tone is normal.  Neurologic: Awake, alert, oriented to name, place and time.  Cranial nerves II-XII are grossly intact.  Motor is 5 out of 5 bilaterally.  Cerebellar finger to nose, heel to shin intact.  Sensory is intact.  Babinski down going, Romberg negative, and gait is normal.    Time Spent on this Encounter   I, Adonay Cross, personally saw the patient today and spent greater than 30 minutes discharging this patient.    Discharge Disposition   Discharged to home  Condition at discharge: Stable    Consultations This Hospital Stay   NEPHROLOGY GENERAL ADULT IP CONSULT  VASCULAR ACCESS CARE ADULT IP CONSULT  NUTRITION SERVICES ADULT IP CONSULT    Discharge Orders   No discharge procedures on file.  Discharge Medications   Current Discharge Medication List      CONTINUE these medications which have NOT CHANGED    Details   megestrol (MEGACE) 40 MG/ML suspension Take 10 mLs (400 mg) by mouth 2 times daily  Qty: 600 mL, Refills: 3    Associated Diagnoses: Liver replaced by transplant (H)      tacrolimus (PROGRAF - GENERIC EQUIVALENT) 1 MG capsule Take 6 capsules (6 mg) by mouth every 12 hours  Qty: 360 capsule, Refills: 11    Comments: Dose change  Associated Diagnoses: Liver transplant recipient (H)      cephALEXin (KEFLEX) 500 MG capsule Take 1 capsule (500 mg) by mouth 4 times daily  Qty: 28 capsule, Refills: 0    Comments: Take one capsule four times a day  Associated Diagnoses: Liver replaced by transplant (H)      furosemide (LASIX) 20 MG tablet Take 4 tablets (80 mg) by mouth daily  Qty: 30 tablet, Refills: 3    Associated Diagnoses: Liver replaced by transplant (H)      lidocaine (LIDODERM) 5 % Patch Place 1 patch onto the skin every 24 hours  Qty: 30 patch, Refills: 1    Associated Diagnoses: Low back pain at multiple sites       LACTOBACILLUS EXTRA STRENGTH CAPS Take 1 capsule by mouth 2 times daily  Qty: 30 capsule, Refills: 1    Associated Diagnoses: Aftercare following organ transplant; Status post liver transplantation (H)      !! gabapentin (NEURONTIN) 300 MG capsule Take 1 capsule (300 mg) by mouth 3 times daily  Qty: 90 capsule, Refills: 1    Associated Diagnoses: Low back pain at multiple sites      oxyCODONE (ROXICODONE) 5 MG IR tablet Take 1-2 tablets (5-10 mg) by mouth every 4 hours as needed for moderate to severe pain  Qty: 40 tablet, Refills: 0    Associated Diagnoses: Liver transplant recipient (H)      aspirin 325 MG tablet 1 tablet (325 mg) by Oral or Feeding Tube route daily  Qty: 180 tablet, Refills: 4    Associated Diagnoses: Liver transplant recipient (H)      insulin glargine (LANTUS) 100 UNIT/ML injection Inject 45 Units Subcutaneous every 24 hours  Qty: 3 mL, Refills: 3    Associated Diagnoses: Liver transplant recipient (H)      valGANciclovir (VALCYTE) 450 MG tablet Take 1 tablet (450 mg) by mouth daily  Qty: 60 tablet, Refills: 5    Comments: X 6 months (CMV IgG D+R-)  Associated Diagnoses: Liver transplant recipient (H)      mycophenolate (CELLCEPT - GENERIC EQUIVALENT) 250 MG capsule Take 4 capsules (1,000 mg) by mouth 2 times daily  Qty: 240 capsule, Refills: 11    Associated Diagnoses: Liver transplant recipient (H)      senna-docusate (SENOKOT-S;PERICOLACE) 8.6-50 MG per tablet Take 2 tablets by mouth 2 times daily  Qty: 100 tablet, Refills: 1    Associated Diagnoses: Liver transplant recipient (H)      sulfamethoxazole-trimethoprim (BACTRIM/SEPTRA) 400-80 MG per tablet Take 1 tablet by mouth daily  Qty: 30 tablet, Refills: 5    Associated Diagnoses: Liver transplant recipient (H)      clotrimazole (MYCELEX) 10 MG LOZG lozenge Place 1 lozenge (1 Beatrice) inside cheek 3 times daily  Qty: 70 each, Refills: 0    Comments: X 12 weeks  Associated Diagnoses: Liver transplant recipient (H)      multivitamin,  therapeutic with minerals (THERA-VIT-M) TABS tablet Take 1 tablet by mouth daily  Qty: 30 each, Refills: 11    Associated Diagnoses: Liver transplant recipient (H)      !! insulin aspart (NOVOLOG PEN) 100 UNIT/ML injection Inject 1-10 Units Subcutaneous 3 times daily (before meals) For Pre-Meal  - 189 give 1 unit.   For Pre-Meal  - 239 give 2 units.   For Pre-Meal  - 289 give 3 units.   For Pre-Meal  - 339 give 4 units.   For Pre-Meal -399 give 5 units.  For Pre-Meal -449 give 6 units.  For Pre-Meal BG = or > 450 give 7 units.  Qty: 3 mL, Refills: 3    Associated Diagnoses: Liver transplant recipient (H)      !! insulin aspart (NOVOLOG PEN) 100 UNIT/ML injection Inject 1-7 Units Subcutaneous At Bedtime Bedtime Blood Glucose (BG)  For  - 249 give 1 units.   For  - 299 give 2 units.   For  - 349 give 3 units.  For - 399 give 4 units.   For BG = or > 400 give 5 units.  Qty: 3 mL, Refills: 3    Associated Diagnoses: Liver transplant recipient (H)      !! gabapentin (NEURONTIN) 300 MG capsule Take 2 capsules (600 mg) by mouth At Bedtime  Qty: 60 capsule, Refills: 0    Associated Diagnoses: Type 2 diabetes mellitus with diabetic polyneuropathy, unspecified long term insulin use status (H)      omeprazole (PRILOSEC) 20 MG CR capsule Take 1 capsule (20 mg) by mouth 2 times daily (before meals)  Qty: 60 capsule, Refills: 0    Associated Diagnoses: Gastroesophageal reflux disease without esophagitis      cyclobenzaprine (FLEXERIL) 10 MG tablet Take 1 tablet (10 mg) by mouth 3 times daily as needed for muscle spasms  Qty: 90 tablet, Refills: 1    Associated Diagnoses: Immunosuppression (H)      prochlorperazine (COMPAZINE) 5 MG tablet Take 1-2 tablets (5-10 mg) by mouth every 6 hours as needed for nausea or vomiting  Qty: 90 tablet, Refills: 0    Associated Diagnoses: Aftercare following organ transplant; Status post liver transplantation (H)      !! insulin aspart  (NOVOLOG PEN) 100 UNIT/ML injection DOSE:  1 units per 8 grams of carbohydrate. With meals and snacks. Only chart total amount of units given.  Do not give if pre-prandial glucose is less than 60 mg/dL.  Qty: 3 mL, Refills: 3    Associated Diagnoses: Liver transplant recipient (H)      ondansetron (ZOFRAN-ODT) 4 MG ODT tab Take 1 tablet (4 mg) by mouth every 8 hours as needed for nausea  Qty: 30 tablet, Refills: 0    Associated Diagnoses: Cirrhosis of liver with ascites, unspecified hepatic cirrhosis type (H); Nausea      glucagon 1 MG SOLR injection Inject 1 mg Subcutaneous every 15 minutes as needed for low blood sugar (May repeat x 1 only)  Qty: 1 each, Refills: 0    Associated Diagnoses: Hypoglycemia       !! - Potential duplicate medications found. Please discuss with provider.      STOP taking these medications       lactobacillus rhamnosus, GG, (CULTURELL) capsule Comments:   Reason for Stopping:             Allergies   Allergies   Allergen Reactions     Erythromycin GI Disturbance     Vioxx      Nausea, vomiting     Data   Most Recent 3 CBC's:  Recent Labs   Lab Test  03/26/17   0556  03/25/17   0646  03/24/17   0556   WBC  5.1  4.1  4.1   HGB  8.1*  7.0*  6.6*   MCV  92  94  96   PLT  139*  134*  147*      Most Recent 3 BMP's:  Recent Labs   Lab Test  03/26/17   0556  03/25/17   1932  03/25/17   0646   03/24/17   0556   NA  133   --   133   --   134   POTASSIUM  4.8  5.9*  5.3   < >  5.8*   CHLORIDE  98   --   96   --   99   CO2  27   --   28   --   27   BUN  36*   --   39*   --   44*   CR  1.36*   --   1.45*   --   1.58*   ANIONGAP  8   --   9   --   8   JACOB  8.0*   --   7.9*   --   8.4*   GLC  143*   --   195*   --   147*    < > = values in this interval not displayed.     Most Recent 2 LFT's:  Recent Labs   Lab Test  03/26/17   0556  03/25/17   0646   AST  14  17   ALT  14  13   ALKPHOS  126  136   BILITOTAL  1.6*  1.7*     Most Recent INR's and Anticoagulation Dosing History:  Anticoagulation Dose  History     Recent Dosing and Labs Latest Ref Rng & Units 3/4/2017 3/4/2017 3/5/2017 3/5/2017 3/6/2017 3/7/2017 3/8/2017    INR 0.86 - 1.14 1.81(H) 1.94(H) 1.62(H) 1.45(H) 1.37(H) 1.24(H) 1.16(H)        Most Recent 3 Troponin's:No lab results found.  Most Recent Cholesterol Panel:  Recent Labs   Lab Test  02/08/16   1144   CHOL  141   LDL  61   HDL  60   TRIG  99     Most Recent 6 Bacteria Isolates From Any Culture (See EPIC Reports for Culture Details):  Recent Labs   Lab Test  03/10/17   1500  03/04/17   1500  03/03/17   2353  03/03/17   1405  02/27/17   1430  02/23/17   1330   CULT  Heavy growth Enterococcus faecium (VRE)*  Culture negative after 16 days  No anaerobes isolated  Since this specimen was not transported in the proper anaerobic transport media,   the absence of anaerobes in this culture does not rule out the presence of   anaerobes in this specimen.    No growth  Moderate growth Enterococcus faecium (VRE)*  No growth  No growth  No growth     Most Recent TSH, T4 and A1c Labs:  Recent Labs   Lab Test  02/16/17   0725   02/08/16   1144   04/11/11   1209   TSH   --    --   3.35   --   2.77   T4   --    --    --    --   1.23   A1C  9.4*   < >   --    < >   --     < > = values in this interval not displayed.

## 2017-03-26 NOTE — DISCHARGE INSTRUCTIONS
Activity  Resume light activities around your home as soon as possible.   Don t lift anything heavier than 10 pounds until your doctor says it s okay.   Limit sports and strenuous activities for 6 weeks.    Patient has been instructed to avoid NSAIDs as these medications may affect the remaining kidney. Take other medications as prescribed.  May shower. Gently wash around your incisions with soap and water.   Don t bathe or soak in a tub until your incisions are well healed.   Wear loose-fitting clothes. This will help you be more comfortable and cause less irritation around your incisions.  Don t drive until you are no longer taking prescription pain medication.    Diet  Low potassium diet   Keep yourself well hydrated.   If you are constipated, take a fiber laxative such as Metamucil.      Notify:  Call your transplant coordinator immediately if you have any of the following:   Swelling, oozing, worsening pain, or unusual redness around the incision   Fever of 100.5 F or higher   Increasing abdominal pain   Severe diarrhea, bloating, or constipation   Nausea or vomiting

## 2017-03-26 NOTE — PLAN OF CARE
Problem: Goal Outcome Summary  Goal: Goal Outcome Summary  Outcome: Improving  Doing well. Alert and oriented, pleasant, ready to go home as soon as he can. BP's trending up today (150s/80s). NSR on telemetry with HR 70-80's. Afebrile. Maintaining O2 sats on room air. Back pain controlled with current regimen. 1 unit PRBC's transfused today. 40 mg IV lasix given after. K+ recheck at 2000 tonight. Family by this afternoon. Ambulated x2 in halls. Report given back to night shift RN who will now assume care.

## 2017-03-27 ENCOUNTER — CARE COORDINATION (OUTPATIENT)
Dept: CARDIOLOGY | Facility: CLINIC | Age: 52
End: 2017-03-27

## 2017-03-27 DIAGNOSIS — Z94.4 LIVER TRANSPLANT RECIPIENT (H): ICD-10-CM

## 2017-03-27 RX ORDER — TACROLIMUS 1 MG/1
6 CAPSULE ORAL EVERY 12 HOURS
Qty: 360 CAPSULE | Refills: 11 | Status: SHIPPED | OUTPATIENT
Start: 2017-03-27 | End: 2017-06-20

## 2017-03-27 RX ORDER — MYCOPHENOLATE MOFETIL 250 MG/1
1000 CAPSULE ORAL 2 TIMES DAILY
Qty: 240 CAPSULE | Refills: 3 | Status: SHIPPED | OUTPATIENT
Start: 2017-03-27 | End: 2017-05-31

## 2017-03-27 NOTE — PLAN OF CARE
Problem: Goal Outcome Summary  Goal: Goal Outcome Summary  Outcome: Adequate for Discharge Date Met:  03/26/17  Camacho is alert and oriented, verbalizes readiness to discharge home. His vitals have been stable. He's sinus rhythm with rare PAC's and PVC's on telemetry which is not new per the tele monitor (Dr. Cross notified of this prior to discharge). HR 70-80's, BP's stable. Verified again with Dr. Cross that no potassium recheck is needed prior to discharge. Per Dr. Cross, no potassium recheck needed. Breathing easily on room air, afebrile. Denies shortness of breath, chest pain or abdominal pain. Endorses back pain which is at its baseline. D/C instructions reviewed in depth with Camacho and his mother Lakshmi who assists him with his medications at home. Medication education provided throughout the day, as well. Camacho is set to have an appointment with Dr. Welch from the liver team on 4/3. I instructed him to call their office tomorrow to inquire whether he needs to be sooner as d/c instructions say follow up within the next week. Camacho and his mother Lakshmi verbalized understanding of this and all discharge instructions. Appeared well at time of discharge. Arranged for him to  his oral Magnesium and Lasix at discharge pharmacy. Wheeled down via wheelchair by NST. PIV and telemetry removed prior to leaving unit.

## 2017-03-27 NOTE — PROGRESS NOTES
Patient is a transplant patient and will be followed by the transplant team for follow up            Follow-up Appointments           Follow Up ([facility])       Follow up with Dr. Tran , at Transplant Center within 1 week. to evaluate after admission.

## 2017-03-27 NOTE — PROGRESS NOTES
Camacho calling about refills, asking how to order refills, reviewed that Camacho will be using [facility] pharmacy, provided phone number for Kathy and [place] pharmacy.  Refilled cellcept and tacrolimus   Camacho states he feels much better, has an appt with Physical therapy at [facility] tomorrow, will have labs done too.

## 2017-03-27 NOTE — PROVIDER NOTIFICATION
At 2020 this RN spoke with [facility] Dr. Wang regarding Dr. Cross's note written at 1417 about holding bactrim due to hyperkalemia. Pt was discharged with instructions to take bactrim daily. Dr. Wang will send a message to Pt's transplant coordinator regarding this medication change.

## 2017-03-28 ENCOUNTER — HOSPITAL ENCOUNTER (OUTPATIENT)
Dept: PHYSICAL THERAPY | Facility: CLINIC | Age: 52
Setting detail: THERAPIES SERIES
End: 2017-03-28
Attending: TRANSPLANT SURGERY
Payer: COMMERCIAL

## 2017-03-28 DIAGNOSIS — D84.9 IMMUNOSUPPRESSION (H): ICD-10-CM

## 2017-03-28 DIAGNOSIS — Z94.4 HISTORY OF LIVER TRANSPLANT (H): ICD-10-CM

## 2017-03-28 PROCEDURE — 97110 THERAPEUTIC EXERCISES: CPT | Mod: GP

## 2017-03-28 PROCEDURE — 97161 PT EVAL LOW COMPLEX 20 MIN: CPT | Mod: GP

## 2017-03-28 PROCEDURE — 40000185 ZZHC STATISTIC PT OUTPT VISIT

## 2017-03-28 NOTE — PROGRESS NOTES
" 03/28/17 1100   Quick Adds   Type of Visit Initial OP PT Evaluation   General Information   Start of Care Date 03/28/17   Referring Physician Jovan Tran MD   Orders Evaluate and Treat as Indicated   Additional Orders Pt post transplant, also has low back discomfort, post tx   Order Date 03/13/17   Medical Diagnosis s/p liver transplant   Onset of illness/injury or Date of Surgery 03/04/17   Precautions/Limitations abdominal precautions   Surgical/Medical history reviewed Yes   Pertinent history of current problem (include personal factors and/or comorbidities that impact the POC) Pt is a 51 year old male with 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 S/p DD OLT on 3/4/2017.    Pertinent Visual History  Does not wear glasses or contacts   Prior level of function comment Pt previously IND with mobility without AD and IND with ADL's without equipment   Current Community Support Family/friend caregiver   Patient role/Employment history Unemployed   Living environment House/[facility]   Home/Community Accessibility Comments Pt is currently living alone at his grandmother's home in [place] as he is able to live on 1 level there.  He states his wife, daughter, and mother come by to assist with laundry which is downstairs.  At baseline, pt lives in [place] with his wife and daughter and has no stairs to enter his home and 10 stairs with R handrail to access his bedroom.  Pt is currently not driving.     Current Assistive Devices Four Wheeled Walker   ADL Devices (none )   Assistive Devices Comments Pt states his 4WW is for \"security\" and he does bring it with him to places but he hasn't used it since being home from the hospital.    Patient/Family Goals Statement pt states he would like to resume walking his dog and walking for exercise stating he was able to walk a couple of miles per day prior to transplant.    General Information Comments pt states he does not \"believe in physical therapy\" " "but was agreeable to evaluation and treatment and following up in the clinic 1x/week for up to 6 weeks stating 1x/week is enough as he is coming to the clinic from [place] at this time.  Pt states he is currently active at home working on upper and lower body strengthening 2x/day and walks in his driveway 3 times per day (~500' each time).  Reviewed 10# lifting restriction with pt due to abdominal precautions and he verbalized understanding and states he has not been lifting more than 10#.  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    Fall Risk Screen   Fall screen completed by PT   Per patient - Fall 2 or more times in past year? No   Per patient - Fall with injury in past year? No   Timed Up and Go score (seconds) 11.37   Is patient a fall risk? No   System Outcome Measures   Outcome Measures AM-PAC   AM-PAC  Basic Mobility Score Level  (Lower scores equate to lower levels of function) 60.54   AM-PAC  Daily Activity Score Level  (Lower scores equate to lower levels of function) 45.93   AM-PAC  Applied Cognitive Score Level  (Lower scores equate to lower levels of function) 53.61   Pain   Patient currently in pain Yes   Pain location low back   Pain rating 5-6/10   Pain description Dull   Pain comments States pain has decreased tremendously since being home from the hospital.  Pt reports he does not feel his pain will ever be below a 5-6/10 due to OA. He uses heat at home which helps and he requested US as he has had this in the past and it has helped.    Cognitive Status Examination   Orientation orientation to person, place and time   Level of Consciousness alert   Follows Commands and Answers Questions 100% of the time   Posture   Posture Forward head position;Protracted " "shoulders   Strength   Strength Comments bilateral hip flexion 4-/5, bilateral hip extension 5/5, bilateral knee flexion and extension 5/5, bilateral ankle PF 5/5, bilateral ankle DF 4-/5.    Bed Mobility   Bed Mobility Comments IND   Transfer Skills   Transfer Comments IND   Gait   Gait Comments pt ambulated without AD with swing through pattern and occasional mild path deviations but no overt LOB.    Gait Special Tests   Gait Special Tests FUNCTIONAL GAIT ASSESSMENT   Gait Special Tests Functional Gait Assessment Score out of 30   Score out of 30 19/30   Balance Special Tests   Balance Special Tests Sit to stand reps;Romberg;Sharpened Romberg;Single leg stance right;Single leg stance left;Timed up and go   Balance Special Tests Timed Up and Go   Seconds 11.37 Seconds   Comments without AD   Balance Special Tests Single Leg Stance Right,   Right, seconds 1.97 Seconds   Balance Special Tests Single Leg Stance Left   Left, seconds 3.53 Seconds   Balance Special Tests Romberg   Comments able to maintain for >1 minute without LOB   Balance Special Tests Sharpened Romberg   Comments able to maintain for >1 minute with instability but no overt LOB.  Initially, had LOB while placing feet and lowered himself into chair.    Balance Special Tests Sit to Stand Reps in 30 Seconds   Reps in 30 seconds 10   Height 16\"   Comments with UE support   Sensory Examination   Sensory Perception no deficits were identified   Coordination   Coordination no deficits were identified   Modality Interventions   Planned Modality Interventions Ultrasound   Planned Therapy Interventions   Planned Therapy Interventions balance training;gait training;neuromuscular re-education;strengthening   Clinical Impression   Criteria for Skilled Therapeutic Interventions Met yes, treatment indicated   PT Diagnosis generalized weakness, deconditioning, higher level balance impairments.   Influenced by the following impairments generalized weakness, " deconditioning, higher level balance impairments.   Functional limitations due to impairments impaired functional activity tolerance and impaired functional mobility.   Clinical Presentation Stable/Uncomplicated   Clinical Presentation Rationale pt's condition is stable with expected continued improvements.    Clinical Decision Making (Complexity) Low complexity   Therapy Frequency 1 time/week   Predicted Duration of Therapy Intervention (days/wks) 6 weeks   Risk & Benefits of therapy have been explained Yes   Patient, Family & other staff in agreement with plan of care Yes   Education Assessment   Barriers to Learning No barriers   GOALS   PT Eval Goals 1;2;3;4   Goal 1   Goal Identifier Sit to stands   Goal Description Pt will complete at least 15 sit to stands in 30 seconds demonstrating improved functional strength and activity tolerance to allow return to walking activities as prior.    Target Date 05/09/17   Goal 2   Goal Identifier FGA   Goal Description Pt will improve FGA score to at least 24/30 demonstrating improved balance to allow safe return to walking activities as prior.   Target Date 05/09/17   Goal 3   Goal Identifier Stairs   Goal Description Pt will safely and independently navigate at least 10 stairs with single rail reciprocally in order to allow safe return to his home here in [place].    Target Date 05/09/17   Goal 4   Goal Identifier HEP   Goal Description Pt will demonstrate independence with strengthening and balance HEP to facilitate return to walking activities as prior.    Target Date 05/09/17   Total Evaluation Time   Total Evaluation Time (Minutes) 44

## 2017-03-28 NOTE — PROGRESS NOTES
03/28/17 1100   Signing Clinician's Name / Credentials   Signing clinician's name / credentials Leela Cuadra, DPT   Functional Gait Assessment (LASHONDA Castro., Kalee GDuy F., et al. (2004))   1. GAIT LEVEL SURFACE 2   2. CHANGE IN GAIT SPEED 2   3. GAIT WITH HORIZONTAL HEAD TURNS 2   4. GAIT WITH VERTICAL HEAD TURNS 2   5. GAIT AND PIVOT TURN 3   6. STEP OVER OBSTACLE 2   7. GAIT WITH NARROW BASE OF SUPPORT 0   8. GAIT WITH EYES CLOSED 2   9. AMBULATING BACKWARDS 2   10. STEPS 2   Total Functional Gait Assessment Score   TOTAL SCORE: (MAXIMUM SCORE 30) 19

## 2017-04-03 ENCOUNTER — OFFICE VISIT (OUTPATIENT)
Dept: TRANSPLANT | Facility: CLINIC | Age: 52
End: 2017-04-03
Attending: TRANSPLANT SURGERY
Payer: COMMERCIAL

## 2017-04-03 DIAGNOSIS — Z94.4 LIVER REPLACED BY TRANSPLANT (H): Primary | ICD-10-CM

## 2017-04-03 DIAGNOSIS — Z94.4 LIVER TRANSPLANT RECIPIENT (H): ICD-10-CM

## 2017-04-03 DIAGNOSIS — Z94.4 LIVER REPLACED BY TRANSPLANT (H): ICD-10-CM

## 2017-04-03 LAB
ALBUMIN SERPL-MCNC: 2.6 G/DL (ref 3.4–5)
ALP SERPL-CCNC: 101 U/L (ref 40–150)
ALT SERPL W P-5'-P-CCNC: 17 U/L (ref 0–70)
ANION GAP SERPL CALCULATED.3IONS-SCNC: 10 MMOL/L (ref 3–14)
AST SERPL W P-5'-P-CCNC: 18 U/L (ref 0–45)
BILIRUB DIRECT SERPL-MCNC: 0.9 MG/DL (ref 0–0.2)
BILIRUB SERPL-MCNC: 1.1 MG/DL (ref 0.2–1.3)
BUN SERPL-MCNC: 40 MG/DL (ref 7–30)
CALCIUM SERPL-MCNC: 8.4 MG/DL (ref 8.5–10.1)
CHLORIDE SERPL-SCNC: 112 MMOL/L (ref 94–109)
CO2 SERPL-SCNC: 23 MMOL/L (ref 20–32)
CREAT SERPL-MCNC: 1.22 MG/DL (ref 0.66–1.25)
ERYTHROCYTE [DISTWIDTH] IN BLOOD BY AUTOMATED COUNT: 15.8 % (ref 10–15)
GFR SERPL CREATININE-BSD FRML MDRD: 62 ML/MIN/1.7M2
GLUCOSE SERPL-MCNC: 159 MG/DL (ref 70–99)
HCT VFR BLD AUTO: 26.2 % (ref 40–53)
HGB BLD-MCNC: 8.7 G/DL (ref 13.3–17.7)
MAGNESIUM SERPL-MCNC: 1.8 MG/DL (ref 1.6–2.3)
MCH RBC QN AUTO: 30.7 PG (ref 26.5–33)
MCHC RBC AUTO-ENTMCNC: 33.2 G/DL (ref 31.5–36.5)
MCV RBC AUTO: 93 FL (ref 78–100)
PHOSPHATE SERPL-MCNC: 3.7 MG/DL (ref 2.5–4.5)
PLATELET # BLD AUTO: 101 10E9/L (ref 150–450)
POTASSIUM SERPL-SCNC: 5.5 MMOL/L (ref 3.4–5.3)
PROT SERPL-MCNC: 5.5 G/DL (ref 6.8–8.8)
RBC # BLD AUTO: 2.83 10E12/L (ref 4.4–5.9)
SODIUM SERPL-SCNC: 145 MMOL/L (ref 133–144)
TACROLIMUS BLD-MCNC: 21.9 UG/L (ref 5–15)
TME LAST DOSE: ABNORMAL H
WBC # BLD AUTO: 6.4 10E9/L (ref 4–11)

## 2017-04-03 PROCEDURE — 83735 ASSAY OF MAGNESIUM: CPT | Performed by: TRANSPLANT SURGERY

## 2017-04-03 PROCEDURE — 80076 HEPATIC FUNCTION PANEL: CPT | Performed by: TRANSPLANT SURGERY

## 2017-04-03 PROCEDURE — 80048 BASIC METABOLIC PNL TOTAL CA: CPT | Performed by: TRANSPLANT SURGERY

## 2017-04-03 PROCEDURE — 80197 ASSAY OF TACROLIMUS: CPT | Performed by: TRANSPLANT SURGERY

## 2017-04-03 PROCEDURE — 84100 ASSAY OF PHOSPHORUS: CPT | Performed by: TRANSPLANT SURGERY

## 2017-04-03 PROCEDURE — 36415 COLL VENOUS BLD VENIPUNCTURE: CPT | Performed by: TRANSPLANT SURGERY

## 2017-04-03 PROCEDURE — 85027 COMPLETE CBC AUTOMATED: CPT | Performed by: TRANSPLANT SURGERY

## 2017-04-03 RX ORDER — CYCLOBENZAPRINE HCL 10 MG
5-10 TABLET ORAL 3 TIMES DAILY PRN
Qty: 30 TABLET | Refills: 1 | Status: SHIPPED | OUTPATIENT
Start: 2017-04-03 | End: 2017-04-24

## 2017-04-03 RX ORDER — OXYCODONE HYDROCHLORIDE 10 MG/1
5 TABLET ORAL EVERY 4 HOURS PRN
Qty: 60 TABLET | Refills: 0 | Status: SHIPPED | OUTPATIENT
Start: 2017-04-03 | End: 2017-04-24

## 2017-04-03 NOTE — MR AVS SNAPSHOT
After Visit Summary   4/3/2017    Camacho Bhagat    MRN: [id number]           Patient Information     Date Of Birth          1965        Visit Information        Provider Department      4/3/2017 11:50 AM Jovan Tran MD [facility] Solid Organ Transplant        Today's Diagnoses     Liver replaced by transplant (H)    -  1       Follow-ups after your visit        Your next 10 appointments already scheduled     Apr 17, 2017 11:30 AM CDT   (Arrive by 11:15 AM)   Liver Return Post Op with Jovan Tran MD   [facility] Solid Organ Transplant ([facility])    [address] [place] [address] [id number] [phone]            Apr 24, 2017  1:00 PM CDT   (Arrive by 12:45 PM)   Return General Liver with Toñito Camejo MD   [facility] Hepatology ([facility])    [address] [place] [address] [id number] [phone]              Who to contact     If you have questions or need follow up information about today's clinic visit or your schedule please contact [facility] SOLID ORGAN TRANSPLANT directly at [phone].  Normal or non-critical lab and imaging results will be communicated to you by [facility], letter or phone within 4 business days after the clinic has received the results. If you do not hear from us within 7 days, please contact the clinic through [facility] or phone. If you have a critical or abnormal lab result, we will notify you by phone as soon as possible.  Submit refill requests through [employer] or call your pharmacy and they will forward the refill request to us. Please allow 3 business days for your refill to be completed.          Additional Information About Your Visit        MyChart Information     [employer] gives you secure access to your electronic health record. If you see a primary care provider, you can also send messages to your care team and make appointments. If you have questions, please call  your primary care clinic.  If you do not have a primary care provider, please call [phone] and they will assist you.        Care EveryWhere ID     This is your Care EveryWhere ID. This could be used by other organizations to access your [facility] medical records  [id number]         Blood Pressure from Last 3 Encounters:   03/26/17 141/90   03/23/17 118/74   03/20/17 115/69    Weight from Last 3 Encounters:   03/26/17 107.2 kg (236 lb 5.3 oz)   03/16/17 103.3 kg (227 lb 12.8 oz)   03/13/17 105.1 kg (231 lb 9.6 oz)              Today, you had the following     No orders found for display         Today's Medication Changes          These changes are accurate as of: 4/3/17 12:22 PM.  If you have any questions, ask your nurse or doctor.               These medicines have changed or have updated prescriptions.        Dose/Directions    * cyclobenzaprine 10 MG tablet   Commonly known as:  FLEXERIL   This may have changed:  Another medication with the same name was added. Make sure you understand how and when to take each.   Used for:  Immunosuppression (H)   Changed by:  Jovan Tran MD        Dose:  10 mg   Take 1 tablet (10 mg) by mouth 3 times daily as needed for muscle spasms   Quantity:  90 tablet   Refills:  1       * cyclobenzaprine 10 MG tablet   Commonly known as:  FLEXERIL   This may have changed:  You were already taking a medication with the same name, and this prescription was added. Make sure you understand how and when to take each.   Used for:  Liver replaced by transplant (H)   Changed by:  Jovan Tran MD        Dose:  5-10 mg   Take 0.5-1 tablets (5-10 mg) by mouth 3 times daily as needed for muscle spasms   Quantity:  30 tablet   Refills:  1       * oxyCODONE 5 MG IR tablet   Commonly known as:  ROXICODONE   This may have changed:  Another medication with the same name was added. Make sure you understand how and when to take each.   Used for:  Liver transplant recipient (H)         Dose:  5-10 mg   Take 1-2 tablets (5-10 mg) by mouth every 4 hours as needed for moderate to severe pain   Quantity:  40 tablet   Refills:  0       * oxyCODONE 10 MG IR tablet   Commonly known as:  ROXICODONE   This may have changed:  You were already taking a medication with the same name, and this prescription was added. Make sure you understand how and when to take each.   Used for:  Liver replaced by transplant (H)   Changed by:  Jovan Tran MD        Dose:  5 mg   Take 0.5 tablets (5 mg) by mouth every 4 hours as needed for moderate to severe pain   Quantity:  60 tablet   Refills:  0       * Notice:  This list has 4 medication(s) that are the same as other medications prescribed for you. Read the directions carefully, and ask your doctor or other care provider to review them with you.         Where to get your medicines      These medications were sent to [facility] [address] [facility] [address]  [address] [place] [address] [address], [facility] [address]    Hours:  TRANSPLANT PHONE NUMBER [phone] Phone:  [phone]     cyclobenzaprine 10 MG tablet         Some of these will need a paper prescription and others can be bought over the counter.  Ask your nurse if you have questions.     Bring a paper prescription for each of these medications     oxyCODONE 10 MG IR tablet                Primary Care Provider Office Phone # Fax #    Shay Kirkpatrick [phone]        [facility] [address] [place] [address] [phone]  [facility] [address]        Thank you!     Thank you for choosing [facility] SOLID ORGAN TRANSPLANT  for your care. Our goal is always to provide you with excellent care. Hearing back from our patients is one way we can continue to improve our services. Please take a few minutes to complete the written survey that you may receive in the mail after your visit with us. Thank you!             Your Updated Medication List - Protect others around you:  Learn how to safely use, store and throw away your medicines at www.disposemymeds.org.          This list is accurate as of: 4/3/17 12:22 PM.  Always use your most recent med list.                   Brand Name Dispense Instructions for use    aspirin 325 MG tablet     180 tablet    1 tablet (325 mg) by Oral or Feeding Tube route daily       clotrimazole 10 MG Lozg lozenge    MYCELEX    70 each    Place 1 lozenge (1 Beatrice) inside cheek 3 times daily       * cyclobenzaprine 10 MG tablet    FLEXERIL    90 tablet    Take 1 tablet (10 mg) by mouth 3 times daily as needed for muscle spasms       * cyclobenzaprine 10 MG tablet    FLEXERIL    30 tablet    Take 0.5-1 tablets (5-10 mg) by mouth 3 times daily as needed for muscle spasms       furosemide 20 MG tablet    LASIX    30 tablet    Take 2 tablets (40 mg) by mouth daily       gabapentin 300 MG capsule    NEURONTIN    90 capsule    Take 1 capsule (300 mg) by mouth 3 times daily       glucagon 1 MG Solr injection     1 each    Inject 1 mg Subcutaneous every 15 minutes as needed for low blood sugar (May repeat x 1 only)       * insulin aspart 100 UNIT/ML injection    NovoLOG PEN    3 mL    DOSE:  1 units per 8 grams of carbohydrate. With meals and snacks. Only chart total amount of units given.  Do not give if pre-prandial glucose is less than 60 mg/dL.       * insulin aspart 100 UNIT/ML injection    NovoLOG PEN    3 mL    Inject 1-10 Units Subcutaneous 3 times daily (before meals) For Pre-Meal  - 189 give 1 unit.  For Pre-Meal  - 239 give 2 units.  For Pre-Meal  - 289 give 3 units.  For Pre-Meal  - 339 give 4 units.  For Pre-Meal -399 give 5 units. For Pre-Meal -449 give 6 units. For Pre-Meal BG = or > 450 give 7 units.       * insulin aspart 100 UNIT/ML injection    NovoLOG PEN    3 mL    Inject 1-7 Units Subcutaneous At Bedtime Bedtime Blood Glucose (BG) For  - 249 give 1 units.  For  - 299 give 2 units.  For  -  349 give 3 units. For - 399 give 4 units.  For BG = or > 400 give 5 units.       insulin glargine 100 UNIT/ML injection    LANTUS    3 mL    Inject 45 Units Subcutaneous every 24 hours       LACTOBACILLUS EXTRA STRENGTH Caps     30 capsule    Take 1 capsule by mouth 2 times daily       lidocaine 5 % Patch    LIDODERM    30 patch    Place 1 patch onto the skin every 24 hours       magnesium oxide 400 MG tablet    MAG-OX    7 tablet    Take 1 tablet (400 mg) by mouth 2 times daily       megestrol 40 MG/ML suspension    MEGACE    600 mL    Take 10 mLs (400 mg) by mouth 2 times daily       multivitamin, therapeutic with minerals Tabs tablet     30 each    Take 1 tablet by mouth daily       mycophenolate 250 MG capsule    CELLCEPT - GENERIC EQUIVALENT    240 capsule    Take 4 capsules (1,000 mg) by mouth 2 times daily       omeprazole 20 MG CR capsule    priLOSEC    60 capsule    Take 1 capsule (20 mg) by mouth 2 times daily (before meals)       ondansetron 4 MG ODT tab    ZOFRAN-ODT    30 tablet    Take 1 tablet (4 mg) by mouth every 8 hours as needed for nausea       * oxyCODONE 5 MG IR tablet    ROXICODONE    40 tablet    Take 1-2 tablets (5-10 mg) by mouth every 4 hours as needed for moderate to severe pain       * oxyCODONE 10 MG IR tablet    ROXICODONE    60 tablet    Take 0.5 tablets (5 mg) by mouth every 4 hours as needed for moderate to severe pain       prochlorperazine 5 MG tablet    COMPAZINE    90 tablet    Take 1-2 tablets (5-10 mg) by mouth every 6 hours as needed for nausea or vomiting       senna-docusate 8.6-50 MG per tablet    SENOKOT-S;PERICOLACE    100 tablet    Take 2 tablets by mouth 2 times daily       sulfamethoxazole-trimethoprim 400-80 MG per tablet    BACTRIM/SEPTRA    30 tablet    Take 1 tablet by mouth daily       tacrolimus 1 MG capsule    PROGRAF - GENERIC EQUIVALENT    360 capsule    Take 6 capsules (6 mg) by mouth every 12 hours       valGANciclovir 450 MG tablet    VALCYTE    60  tablet    Take 1 tablet (450 mg) by mouth daily       * Notice:  This list has 7 medication(s) that are the same as other medications prescribed for you. Read the directions carefully, and ask your doctor or other care provider to review them with you.

## 2017-04-03 NOTE — NURSING NOTE
.Here for post liver transplant follow-up.    Doing well, back pain has improved.  Pt reports that he did go into PT at [facility], was familiar with the exercises, and is doing those on his own.       Current immunosuppression:    Tacrolimus 6mg Q 12 hours,  Cellcept 1000 mg Q 12 hours.    Complaints:   None.    Med changes: none      Lab frequency:  weekly    Follow-up:  in 2 weeks, plan for pt to see orthopedics re: back pain, results of MRI of the spine.

## 2017-04-03 NOTE — PROGRESS NOTES
HPI      ROS      Physical Exam    Transplant Surgery -OUTPATIENT IMMUNOSUPPRESSION PROGRESS NOTE    Date of Visit: 04/03/2017    Transplants:  3/4/2017 (Liver); Postoperative day:  30  ASSESMENT AND PLAN:  1.Graft Function: Liver allograft: no rejection or technical problems.    2.Immunosuppression Management: keep tacrolimus levels at 10  3.Hypertension: ok  4.Renal Function: good  5.Lab frequency: twice weekly  6.Other:  Ascites: continue lasix  Back pain oxycodone and flexoril  Will need MRI back    Date: April 3, 2017    Transplant:  [x]                             Liver [x]                              Kidney []                             Pancreas []                              Other:             Chief Complaint:No chief complaint on file.  doing well, back pain improved but still there  History of Present Illness:  Patient Active Problem List   Diagnosis     Diabetes mellitus, type 2 (H)     Carpal tunnel syndrome     Essential hypertension     Personal history of thrombophlebitis     Esophageal reflux     Depressive disorder, not elsewhere classified     Hyperlipidemia     Sleep apnea     Testicular hypofunction     TYPE 2 DIABETES, HBA1C GOAL < 7%     HYPERLIPIDEMIA LDL GOAL <100     Fibromyalgia     OA (osteoarthritis)     Cirrhosis (H)     Sicca syndrome (H)     Knee pain     Primary localized osteoarthrosis, lower leg     Cerebrovascular accident (H)     Diabetes mellitus with neurological manifestation (H)     Medication refill- do not delete      Pain medication agreement signed - Shay Kirkpatrick 2/7/14     Hepatocellular carcinoma (H)     Hepatic cirrhosis, unspecified hepatic cirrhosis type (H)     Preoperative cardiovascular examination     HCC (hepatocellular carcinoma) (H)     Edema     Cellulitis of scrotum     Uncontrolled type 2 diabetes mellitus with hyperglycemia, with long-term current use of insulin (H)     Debility     Type 2 diabetes mellitus without complication, with long-term current use  of insulin (H)     Altered mental status, unspecified     Hepatic encephalopathy (H)     Urinary tract infection associated with catheterization of urinary tract (H)     Altered mental status     Hypoglycemia     Hepatic cirrhosis (H)     Osteoarthritis     Rheumatoid arthritis (H)     Hyperkalemia     Liver transplant recipient (H)     Acute kidney injury (H)     Immunosuppressed status (H)     SOCIAL /FAMILY HISTORY: [x]                  No recent change    Past Medical History:   Diagnosis Date     Acute venous embolism and thrombosis of other specified veins 11/01    Deep vein thrombophlebitis, filter placed     Cancer (H)      Depressive disorder, not elsewhere classified      Esophageal reflux      Fibromyalgia 1/2009    dx with Dr Benitez( Rheum)     Osteoarthritis      Other and unspecified hyperlipidemia      Other chronic nonalcoholic liver disease     Fatty liver      Other testicular hypofunction      Pneumonia, organism unspecified 10-01    Included ARDS, sepsis, and  acute renal failure; hospitalized     Rheumatoid arthritis(714.0)      Type II or unspecified type diabetes mellitus without mention of complication, not stated as uncontrolled 11/01    Managed by endocrinology     Unspecified essential hypertension 11/01    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     Unspecified sleep apnea     Uses BiPAP     Past Surgical History:   Procedure Laterality Date     BENCH LIVER N/A 3/4/2017    Procedure: BENCH LIVER;  Surgeon: Jovan Tran MD;  Location: [facility] OR     C NONSPECIFIC PROCEDURE      tracheostomy     C NONSPECIFIC PROCEDURE      repair of deviated septum     C NONSPECIFIC PROCEDURE  2007    Rt knee arthroscopy     C TOTAL KNEE ARTHROPLASTY  2008    Right knee arthroscopy     CHOLECYSTECTOMY       ESOPHAGOSCOPY, GASTROSCOPY, DUODENOSCOPY (EGD), COMBINED N/A 8/4/2016    Procedure: COMBINED ESOPHAGOSCOPY, GASTROSCOPY, DUODENOSCOPY (EGD), BIOPSY SINGLE OR MULTIPLE;  Surgeon: Trent Pederson  MD KELSEA;  Location:  GI     TRANSPLANT LIVER RECIPIENT  DONOR N/A 3/4/2017    Procedure: TRANSPLANT LIVER RECIPIENT  DONOR;  Surgeon: Jovan Tran MD;  Location:  OR     Social History     Social History     Marital status:      Spouse name: N/A     Number of children: 1     Years of education: N/A     Occupational History            Social History Main Topics     Smoking status: Former Smoker     Smokeless tobacco: Former User     Types: Chew     Quit date: 10/8/2015      Comment: Has used chewing tobacco since age 15 , chewed for 20yrs      Alcohol use No      Comment: last drink about a year ago (quit )     Drug use: No     Sexual activity: Yes     Partners: Female     Other Topics Concern     Not on file     Social History Narrative    uSED TO BE      Back in school now         Prescription Medications as of 4/3/2017             oxyCODONE (ROXICODONE) 10 MG IR tablet Take 0.5 tablets (5 mg) by mouth every 4 hours as needed for moderate to severe pain    cyclobenzaprine (FLEXERIL) 10 MG tablet Take 0.5-1 tablets (5-10 mg) by mouth 3 times daily as needed for muscle spasms    tacrolimus (PROGRAF - GENERIC EQUIVALENT) 1 MG capsule Take 6 capsules (6 mg) by mouth every 12 hours    mycophenolate (CELLCEPT - GENERIC EQUIVALENT) 250 MG capsule Take 4 capsules (1,000 mg) by mouth 2 times daily    furosemide (LASIX) 20 MG tablet Take 2 tablets (40 mg) by mouth daily    magnesium oxide (MAG-OX) 400 MG tablet Take 1 tablet (400 mg) by mouth 2 times daily    megestrol (MEGACE) 40 MG/ML suspension Take 10 mLs (400 mg) by mouth 2 times daily    cyclobenzaprine (FLEXERIL) 10 MG tablet Take 1 tablet (10 mg) by mouth 3 times daily as needed for muscle spasms    lidocaine (LIDODERM) 5 % Patch Place 1 patch onto the skin every 24 hours    LACTOBACILLUS EXTRA STRENGTH CAPS Take 1 capsule by mouth 2 times daily    prochlorperazine (COMPAZINE) 5 MG tablet  Take 1-2 tablets (5-10 mg) by mouth every 6 hours as needed for nausea or vomiting    gabapentin (NEURONTIN) 300 MG capsule Take 1 capsule (300 mg) by mouth 3 times daily    oxyCODONE (ROXICODONE) 5 MG IR tablet Take 1-2 tablets (5-10 mg) by mouth every 4 hours as needed for moderate to severe pain    aspirin 325 MG tablet 1 tablet (325 mg) by Oral or Feeding Tube route daily    insulin aspart (NOVOLOG PEN) 100 UNIT/ML injection DOSE:  1 units per 8 grams of carbohydrate. With meals and snacks. Only chart total amount of units given.  Do not give if pre-prandial glucose is less than 60 mg/dL.    insulin glargine (LANTUS) 100 UNIT/ML injection Inject 45 Units Subcutaneous every 24 hours    valGANciclovir (VALCYTE) 450 MG tablet Take 1 tablet (450 mg) by mouth daily    senna-docusate (SENOKOT-S;PERICOLACE) 8.6-50 MG per tablet Take 2 tablets by mouth 2 times daily    sulfamethoxazole-trimethoprim (BACTRIM/SEPTRA) 400-80 MG per tablet Take 1 tablet by mouth daily    clotrimazole (MYCELEX) 10 MG LOZG lozenge Place 1 lozenge (1 Beatrice) inside cheek 3 times daily    multivitamin, therapeutic with minerals (THERA-VIT-M) TABS tablet Take 1 tablet by mouth daily    insulin aspart (NOVOLOG PEN) 100 UNIT/ML injection Inject 1-10 Units Subcutaneous 3 times daily (before meals) For Pre-Meal  - 189 give 1 unit.   For Pre-Meal  - 239 give 2 units.   For Pre-Meal  - 289 give 3 units.   For Pre-Meal  - 339 give 4 units.   For Pre-Meal -399 give 5 units.  For Pre-Meal -449 give 6 units.  For Pre-Meal BG = or > 450 give 7 units.    insulin aspart (NOVOLOG PEN) 100 UNIT/ML injection Inject 1-7 Units Subcutaneous At Bedtime Bedtime Blood Glucose (BG)  For  - 249 give 1 units.   For  - 299 give 2 units.   For  - 349 give 3 units.  For - 399 give 4 units.   For BG = or > 400 give 5 units.    ondansetron (ZOFRAN-ODT) 4 MG ODT tab Take 1 tablet (4 mg) by mouth every 8 hours as  needed for nausea    omeprazole (PRILOSEC) 20 MG CR capsule Take 1 capsule (20 mg) by mouth 2 times daily (before meals)    glucagon 1 MG SOLR injection Inject 1 mg Subcutaneous every 15 minutes as needed for low blood sugar (May repeat x 1 only)      Facility Administered Medications as of 4/3/2017             albumin human 25 % injection 12.5 g Inject 50 mLs (12.5 g) into the vein 4 times daily as needed for other (12.5 grams per liter of ascitic fluid removed up to 50 grams)        Erythromycin and Vioxx   REVIEW OF SYSTEMS (check box if normal)  [x]               GENERAL  [x]                 PULMONARY [x]                GENITOURINARY  [x]                CNS                 [x]                 CARDIAC  [x]                 ENDOCRINE  [x]                EARS,NOSE,THROAT [x]                 GASTROINTESTINAL [x]                 NEUROLOGIC    [x]                MUSCLOSKELTAL  [x]                  HEMATOLOGY      PHYSICAL EXAM (check box if normal)There were no vitals taken for this visit.    @txexam@    [x]            GENERAL:    [x]       EYES:  ICTERIC   []        YES  []                    NO  [x]           EXTREMITIES: Clubbing []       Y     [x]           N    [x]           EARS, NOSE, THROAT: Membranes Moist    YES   [x]                   NO []                  [x]           LUNGS:  CLEAR    YES       [x]                  NO    []                                [x]           SKIN: Jaundice           YES       []                  NO    [x]                   Rash: YES       []                  NO    [x]                                     [x]             HEART: Regular Rate          YES       [x]                  NO    []                   Incision Clean:  YES       [x]                  NO    []                                [x]                    ABDOMEN: Wound healing well Organomegaly YES       []                  NO    [x]                       [x]                    NEUROLOGICAL:  Nonfocal  YES       [x]                   NO    []                       [x]                    Hernia YES       []                  NO    [x]                   PSYCHIATRIC:  Appropriate  YES       [x]                  NO    []                       OTHER:                                                                                                   PAIN SCALE:: 3

## 2017-04-03 NOTE — LETTER
4/3/2017       RE: Camacho Bhagat  [address] [place] [facility] [address]     Dear Colleague,    Thank you for referring your patient, Camacho Bhagat, to the [facility]GAN TRANSPL[facility] at [facility]. Please see a copy of my visit note below.    HPI      ROS      Physical Exam    Transplant Surgery -OUTPATIENT IMMUNOSUPPRESSION PROGRESS NOTE    Date of Visit: 04/03/2017    Transplants:  3/4/2017 (Liver); Postoperative day:  30  ASSESMENT AND PLAN:  1.Graft Function: Liver allograft: no rejection or technical problems.    2.Immunosuppression Management: keep tacrolimus levels at 10  3.Hypertension: ok  4.Renal Function: good  5.Lab frequency: twice weekly  6.Other:  Ascites: continue lasix  Back pain oxycodone and flexoril  Will need MRI back    Date: April 3, 2017    Transplant:  [x]                             Liver [x]                              Kidney []                             Pancreas []                              Other:             Chief Complaint:No chief complaint on file.  doing well, back pain improved but still there  History of Present Illness:  Patient Active Problem List   Diagnosis     Diabetes mellitus, type 2 (H)     Carpal tunnel syndrome     Essential hypertension     Personal history of thrombophlebitis     Esophageal reflux     Depressive disorder, not elsewhere classified     Hyperlipidemia     Sleep apnea     Testicular hypofunction     TYPE 2 DIABETES, HBA1C GOAL < 7%     HYPERLIPIDEMIA LDL GOAL <100     Fibromyalgia     OA (osteoarthritis)     Cirrhosis (H)     Sicca syndrome (H)     Knee pain     Primary localized osteoarthrosis, lower leg     Cerebrovascular accident (H)     Diabetes mellitus with neurological manifestation (H)     Medication refill- do not delete      Pain medication agreement signed - Shay Kirkpatrick 2/7/14     Hepatocellular carcinoma (H)     Hepatic cirrhosis, unspecified hepatic cirrhosis type (H)     Preoperative  cardiovascular examination     HCC (hepatocellular carcinoma) (H)     Edema     Cellulitis of scrotum     Uncontrolled type 2 diabetes mellitus with hyperglycemia, with long-term current use of insulin (H)     Debility     Type 2 diabetes mellitus without complication, with long-term current use of insulin (H)     Altered mental status, unspecified     Hepatic encephalopathy (H)     Urinary tract infection associated with catheterization of urinary tract (H)     Altered mental status     Hypoglycemia     Hepatic cirrhosis (H)     Osteoarthritis     Rheumatoid arthritis (H)     Hyperkalemia     Liver transplant recipient (H)     Acute kidney injury (H)     Immunosuppressed status (H)     SOCIAL /FAMILY HISTORY: [x]                  No recent change    Past Medical History:   Diagnosis Date     Acute venous embolism and thrombosis of other specified veins 11/01    Deep vein thrombophlebitis, filter placed     Cancer (H)      Depressive disorder, not elsewhere classified      Esophageal reflux      Fibromyalgia 1/2009    dx with Dr Benitez( Rheum)     Osteoarthritis      Other and unspecified hyperlipidemia      Other chronic nonalcoholic liver disease     Fatty liver      Other testicular hypofunction      Pneumonia, organism unspecified 10-01    Included ARDS, sepsis, and  acute renal failure; hospitalized     Rheumatoid arthritis(714.0)      Type II or unspecified type diabetes mellitus without mention of complication, not stated as uncontrolled 11/01    Managed by endocrinology     Unspecified essential hypertension 11/01    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     Unspecified sleep apnea     Uses BiPAP     Past Surgical History:   Procedure Laterality Date     BENCH LIVER N/A 3/4/2017    Procedure: BENCH LIVER;  Surgeon: Jovan Tran MD;  Location: [facility] OR     C NONSPECIFIC PROCEDURE      tracheostomy     C NONSPECIFIC PROCEDURE      repair of deviated septum     C NONSPECIFIC PROCEDURE  2007    Rt  knee arthroscopy     C TOTAL KNEE ARTHROPLASTY      Right knee arthroscopy     CHOLECYSTECTOMY       ESOPHAGOSCOPY, GASTROSCOPY, DUODENOSCOPY (EGD), COMBINED N/A 2016    Procedure: COMBINED ESOPHAGOSCOPY, GASTROSCOPY, DUODENOSCOPY (EGD), BIOPSY SINGLE OR MULTIPLE;  Surgeon: Trent Pederson MD;  Location:  GI     TRANSPLANT LIVER RECIPIENT  DONOR N/A 3/4/2017    Procedure: TRANSPLANT LIVER RECIPIENT  DONOR;  Surgeon: Jovan Tran MD;  Location:  OR     Social History     Social History     Marital status:      Spouse name: N/A     Number of children: 1     Years of education: N/A     Occupational History            Social History Main Topics     Smoking status: Former Smoker     Smokeless tobacco: Former User     Types: Chew     Quit date: 10/8/2015      Comment: Has used chewing tobacco since age 15 , chewed for 20yrs      Alcohol use No      Comment: last drink about a year ago (quit )     Drug use: No     Sexual activity: Yes     Partners: Female     Other Topics Concern     Not on file     Social History Narrative    uSED TO BE      Back in school now         Prescription Medications as of 4/3/2017             oxyCODONE (ROXICODONE) 10 MG IR tablet Take 0.5 tablets (5 mg) by mouth every 4 hours as needed for moderate to severe pain    cyclobenzaprine (FLEXERIL) 10 MG tablet Take 0.5-1 tablets (5-10 mg) by mouth 3 times daily as needed for muscle spasms    tacrolimus (PROGRAF - GENERIC EQUIVALENT) 1 MG capsule Take 6 capsules (6 mg) by mouth every 12 hours    mycophenolate (CELLCEPT - GENERIC EQUIVALENT) 250 MG capsule Take 4 capsules (1,000 mg) by mouth 2 times daily    furosemide (LASIX) 20 MG tablet Take 2 tablets (40 mg) by mouth daily    magnesium oxide (MAG-OX) 400 MG tablet Take 1 tablet (400 mg) by mouth 2 times daily    megestrol (MEGACE) 40 MG/ML suspension Take 10 mLs (400 mg) by mouth 2 times daily     cyclobenzaprine (FLEXERIL) 10 MG tablet Take 1 tablet (10 mg) by mouth 3 times daily as needed for muscle spasms    lidocaine (LIDODERM) 5 % Patch Place 1 patch onto the skin every 24 hours    LACTOBACILLUS EXTRA STRENGTH CAPS Take 1 capsule by mouth 2 times daily    prochlorperazine (COMPAZINE) 5 MG tablet Take 1-2 tablets (5-10 mg) by mouth every 6 hours as needed for nausea or vomiting    gabapentin (NEURONTIN) 300 MG capsule Take 1 capsule (300 mg) by mouth 3 times daily    oxyCODONE (ROXICODONE) 5 MG IR tablet Take 1-2 tablets (5-10 mg) by mouth every 4 hours as needed for moderate to severe pain    aspirin 325 MG tablet 1 tablet (325 mg) by Oral or Feeding Tube route daily    insulin aspart (NOVOLOG PEN) 100 UNIT/ML injection DOSE:  1 units per 8 grams of carbohydrate. With meals and snacks. Only chart total amount of units given.  Do not give if pre-prandial glucose is less than 60 mg/dL.    insulin glargine (LANTUS) 100 UNIT/ML injection Inject 45 Units Subcutaneous every 24 hours    valGANciclovir (VALCYTE) 450 MG tablet Take 1 tablet (450 mg) by mouth daily    senna-docusate (SENOKOT-S;PERICOLACE) 8.6-50 MG per tablet Take 2 tablets by mouth 2 times daily    sulfamethoxazole-trimethoprim (BACTRIM/SEPTRA) 400-80 MG per tablet Take 1 tablet by mouth daily    clotrimazole (MYCELEX) 10 MG LOZG lozenge Place 1 lozenge (1 Beatrice) inside cheek 3 times daily    multivitamin, therapeutic with minerals (THERA-VIT-M) TABS tablet Take 1 tablet by mouth daily    insulin aspart (NOVOLOG PEN) 100 UNIT/ML injection Inject 1-10 Units Subcutaneous 3 times daily (before meals) For Pre-Meal  - 189 give 1 unit.   For Pre-Meal  - 239 give 2 units.   For Pre-Meal  - 289 give 3 units.   For Pre-Meal  - 339 give 4 units.   For Pre-Meal -399 give 5 units.  For Pre-Meal -449 give 6 units.  For Pre-Meal BG = or > 450 give 7 units.    insulin aspart (NOVOLOG PEN) 100 UNIT/ML injection Inject 1-7  Units Subcutaneous At Bedtime Bedtime Blood Glucose (BG)  For  - 249 give 1 units.   For  - 299 give 2 units.   For  - 349 give 3 units.  For - 399 give 4 units.   For BG = or > 400 give 5 units.    ondansetron (ZOFRAN-ODT) 4 MG ODT tab Take 1 tablet (4 mg) by mouth every 8 hours as needed for nausea    omeprazole (PRILOSEC) 20 MG CR capsule Take 1 capsule (20 mg) by mouth 2 times daily (before meals)    glucagon 1 MG SOLR injection Inject 1 mg Subcutaneous every 15 minutes as needed for low blood sugar (May repeat x 1 only)      Facility Administered Medications as of 4/3/2017             albumin human 25 % injection 12.5 g Inject 50 mLs (12.5 g) into the vein 4 times daily as needed for other (12.5 grams per liter of ascitic fluid removed up to 50 grams)        Erythromycin and Vioxx   REVIEW OF SYSTEMS (check box if normal)  [x]               GENERAL  [x]                 PULMONARY [x]                GENITOURINARY  [x]                CNS                 [x]                 CARDIAC  [x]                 ENDOCRINE  [x]                EARS,NOSE,THROAT [x]                 GASTROINTESTINAL [x]                 NEUROLOGIC    [x]                MUSCLOSKELTAL  [x]                  HEMATOLOGY      PHYSICAL EXAM (check box if normal)There were no vitals taken for this visit.    @txexam@    [x]            GENERAL:    [x]       EYES:  ICTERIC   []        YES  []                    NO  [x]           EXTREMITIES: Clubbing []       Y     [x]           N    [x]           EARS, NOSE, THROAT: Membranes Moist    YES   [x]                   NO []                  [x]           LUNGS:  CLEAR    YES       [x]                  NO    []                                [x]           SKIN: Jaundice           YES       []                  NO    [x]                   Rash: YES       []                  NO    [x]                                     [x]             HEART: Regular Rate          YES       [x]                   NO    []                   Incision Clean:  YES       [x]                  NO    []                                [x]                    ABDOMEN: Wound healing well Organomegaly YES       []                  NO    [x]                       [x]                    NEUROLOGICAL:  Nonfocal  YES       [x]                  NO    []                       [x]                    Hernia YES       []                  NO    [x]                   PSYCHIATRIC:  Appropriate  YES       [x]                  NO    []                       OTHER:                                                                                                   PAIN SCALE:: 3    Again, thank you for allowing me to participate in the care of your patient.      Sincerely,    Jovan Tran MD

## 2017-04-04 LAB
FUNGUS SPEC CULT: NORMAL
MICRO REPORT STATUS: NORMAL
SPECIMEN SOURCE: NORMAL

## 2017-04-06 DIAGNOSIS — Z94.4 HISTORY OF LIVER TRANSPLANT (H): Primary | ICD-10-CM

## 2017-04-06 DIAGNOSIS — Z79.60 LONG-TERM USE OF IMMUNOSUPPRESSANT MEDICATION: ICD-10-CM

## 2017-04-06 NOTE — PROGRESS NOTES
Plan to check for presence of biliary stent, with clinic visit on 4/17.    Please made an appt for pt to do post transplant labs, and abd xray on 4/17 before his appt with .  He also needs a lab appt to be done before his appt with  on 4/24.

## 2017-04-07 ENCOUNTER — TELEPHONE (OUTPATIENT)
Dept: PHARMACY | Facility: CLINIC | Age: 52
End: 2017-04-07

## 2017-04-12 ENCOUNTER — HOSPITAL ENCOUNTER (OUTPATIENT)
Dept: LAB | Facility: CLINIC | Age: 52
End: 2017-04-12
Attending: TRANSPLANT SURGERY
Payer: COMMERCIAL

## 2017-04-12 ENCOUNTER — HOSPITAL ENCOUNTER (OUTPATIENT)
Dept: GENERAL RADIOLOGY | Facility: CLINIC | Age: 52
Discharge: HOME | End: 2017-04-12
Attending: TRANSPLANT SURGERY | Admitting: TRANSPLANT SURGERY
Payer: COMMERCIAL

## 2017-04-12 DIAGNOSIS — Z94.4 HISTORY OF LIVER TRANSPLANT (H): ICD-10-CM

## 2017-04-12 DIAGNOSIS — Z79.60 LONG-TERM USE OF IMMUNOSUPPRESSANT MEDICATION: ICD-10-CM

## 2017-04-12 DIAGNOSIS — Z94.4 LIVER REPLACED BY TRANSPLANT (H): ICD-10-CM

## 2017-04-12 LAB
ALBUMIN SERPL-MCNC: 2.8 G/DL (ref 3.4–5)
ALP SERPL-CCNC: 95 U/L (ref 40–150)
ALT SERPL W P-5'-P-CCNC: 15 U/L (ref 0–70)
ANION GAP SERPL CALCULATED.3IONS-SCNC: 9 MMOL/L (ref 3–14)
AST SERPL W P-5'-P-CCNC: 16 U/L (ref 0–45)
BILIRUB DIRECT SERPL-MCNC: 0.6 MG/DL (ref 0–0.2)
BILIRUB SERPL-MCNC: 0.8 MG/DL (ref 0.2–1.3)
BUN SERPL-MCNC: 33 MG/DL (ref 7–30)
CALCIUM SERPL-MCNC: 8.1 MG/DL (ref 8.5–10.1)
CHLORIDE SERPL-SCNC: 110 MMOL/L (ref 94–109)
CO2 SERPL-SCNC: 21 MMOL/L (ref 20–32)
CREAT SERPL-MCNC: 1.41 MG/DL (ref 0.66–1.25)
ERYTHROCYTE [DISTWIDTH] IN BLOOD BY AUTOMATED COUNT: 15.9 % (ref 10–15)
GFR SERPL CREATININE-BSD FRML MDRD: 53 ML/MIN/1.7M2
GLUCOSE SERPL-MCNC: 283 MG/DL (ref 70–99)
HCT VFR BLD AUTO: 25.6 % (ref 40–53)
HGB BLD-MCNC: 8.5 G/DL (ref 13.3–17.7)
MAGNESIUM SERPL-MCNC: 1.8 MG/DL (ref 1.6–2.3)
MCH RBC QN AUTO: 30.4 PG (ref 26.5–33)
MCHC RBC AUTO-ENTMCNC: 33.2 G/DL (ref 31.5–36.5)
MCV RBC AUTO: 91 FL (ref 78–100)
PHOSPHATE SERPL-MCNC: 4.2 MG/DL (ref 2.5–4.5)
PLATELET # BLD AUTO: 117 10E9/L (ref 150–450)
POTASSIUM SERPL-SCNC: 5.5 MMOL/L (ref 3.4–5.3)
PROT SERPL-MCNC: 5.6 G/DL (ref 6.8–8.8)
RBC # BLD AUTO: 2.8 10E12/L (ref 4.4–5.9)
SODIUM SERPL-SCNC: 140 MMOL/L (ref 133–144)
WBC # BLD AUTO: 5.2 10E9/L (ref 4–11)

## 2017-04-12 PROCEDURE — 74000 XR ABDOMEN 1 VW: CPT

## 2017-04-12 PROCEDURE — 80197 ASSAY OF TACROLIMUS: CPT | Performed by: TRANSPLANT SURGERY

## 2017-04-12 PROCEDURE — 83735 ASSAY OF MAGNESIUM: CPT | Performed by: TRANSPLANT SURGERY

## 2017-04-12 PROCEDURE — 80048 BASIC METABOLIC PNL TOTAL CA: CPT | Performed by: TRANSPLANT SURGERY

## 2017-04-12 PROCEDURE — 84100 ASSAY OF PHOSPHORUS: CPT | Performed by: TRANSPLANT SURGERY

## 2017-04-12 PROCEDURE — 85027 COMPLETE CBC AUTOMATED: CPT | Performed by: TRANSPLANT SURGERY

## 2017-04-12 PROCEDURE — 80076 HEPATIC FUNCTION PANEL: CPT | Performed by: TRANSPLANT SURGERY

## 2017-04-12 PROCEDURE — 36415 COLL VENOUS BLD VENIPUNCTURE: CPT | Performed by: TRANSPLANT SURGERY

## 2017-04-13 ENCOUNTER — TELEPHONE (OUTPATIENT)
Dept: TRANSPLANT | Facility: CLINIC | Age: 52
End: 2017-04-13

## 2017-04-13 ENCOUNTER — TELEPHONE (OUTPATIENT)
Dept: GASTROENTEROLOGY | Facility: CLINIC | Age: 52
End: 2017-04-13

## 2017-04-13 LAB
TACROLIMUS BLD-MCNC: 23.4 UG/L (ref 5–15)
TME LAST DOSE: ABNORMAL H

## 2017-04-13 NOTE — TELEPHONE ENCOUNTER
DATE:  4/13/2017   TIME OF RECEIPT FROM LAB:  8:10 am  LAB TEST:  Tacrolimus ( no trough, 4 hr. Post dose), (last dose  4/12/17 @ 10:00 am, draw 4/12/17@14:02pm)  LAB VALUE:  23.4  RESULTS GIVEN WITH READ-BACK TO (PROVIDER):  Zeenat Henriquez  TIME LAB VALUE REPORTED TO PROVIDER:   8:17 am

## 2017-04-13 NOTE — TELEPHONE ENCOUNTER
Pt had inaccurate trough level. Pt very angry and upset for being called about it. Pt reminded to complete a 12 hour trough level next week.

## 2017-04-17 ENCOUNTER — OFFICE VISIT (OUTPATIENT)
Dept: TRANSPLANT | Facility: CLINIC | Age: 52
End: 2017-04-17
Attending: TRANSPLANT SURGERY
Payer: COMMERCIAL

## 2017-04-17 VITALS
HEIGHT: 72 IN | BODY MASS INDEX: 33.32 KG/M2 | HEART RATE: 86 BPM | SYSTOLIC BLOOD PRESSURE: 185 MMHG | DIASTOLIC BLOOD PRESSURE: 98 MMHG | OXYGEN SATURATION: 99 % | RESPIRATION RATE: 18 BRPM | WEIGHT: 246 LBS | TEMPERATURE: 98.1 F

## 2017-04-17 DIAGNOSIS — D84.9 IMMUNOSUPPRESSION (H): Primary | ICD-10-CM

## 2017-04-17 NOTE — NURSING NOTE
"Here for post liver transplant follow-up.    Camacho reports that he had taken his tacrolimus the last few lab draws, \"forgot that the level would be done and took my meds\"  Plans to do labs on Wed, will remember to not take his tacrolimus until after labs are drawn.    Pt did have abdominal xray to evaluate for biliary stent, per  the stent is gone.  There is a IVC filter present that pt reports was placed in ?2002,  Per  this filter should be removed, recommending referral to IR to have removed.    Current immunosuppression:    Tacrolimus 6mg Q 12 hours, cellcept 1000 mg Q 12 hours.    Complaints:  None,      Med changes: none.  Updated patient's med card.    Lab frequency:  weekly    Follow-up:  Hepatology   4/26                     Transplant surgery  5/08/2017  "

## 2017-04-17 NOTE — LETTER
4/17/2017       RE: Camacho Bhagat  [address] [facility] [id number]     Dear Colleague,    Thank you for referring your patient, Camacho Bhagat, to the [facility] SOLID ORGAN TRANSPLANT at [facility]. Please see a copy of my visit note below.    HPI      ROS      Physical Exam    Transplant Surgery -OUTPATIENT IMMUNOSUPPRESSION PROGRESS NOTE    Date of Visit: 04/17/2017    Transplants:  3/4/2017 (Liver); Postoperative day:  44  ASSESMENT AND PLAN:  1.Graft Function: Liver allograft: no rejection or technical problems.    2.Immunosuppression Management: keep tacrolimus levels at 8-10  3.Hypertension: ok  4.Renal Function: good  5.Lab frequency: twice weekly  6.Other:  No biliary stent on KUB and needs IVC filter needs removal by IR    Date: April 17, 2017    Transplant:  [x]                             Liver [x]                              Kidney []                             Pancreas []                              Other:             Chief Complaint:Liver Transplant (POD 44)  Doing well, no fever or abdominal pain  History of Present Illness:  Patient Active Problem List   Diagnosis     Diabetes mellitus, type 2 (H)     Carpal tunnel syndrome     Essential hypertension     Personal history of thrombophlebitis     Esophageal reflux     Depressive disorder, not elsewhere classified     Hyperlipidemia     Sleep apnea     Testicular hypofunction     TYPE 2 DIABETES, HBA1C GOAL < 7%     HYPERLIPIDEMIA LDL GOAL <100     Fibromyalgia     OA (osteoarthritis)     Cirrhosis (H)     Sicca syndrome (H)     Knee pain     Primary localized osteoarthrosis, lower leg     Cerebrovascular accident (H)     Diabetes mellitus with neurological manifestation (H)     Medication refill- do not delete      Pain medication agreement signed - Shay Kirkpatrick 2/7/14     Hepatocellular carcinoma (H)     Hepatic cirrhosis, unspecified hepatic cirrhosis type (H)     Preoperative cardiovascular examination      HCC (hepatocellular carcinoma) (H)     Edema     Cellulitis of scrotum     Uncontrolled type 2 diabetes mellitus with hyperglycemia, with long-term current use of insulin (H)     Debility     Type 2 diabetes mellitus without complication, with long-term current use of insulin (H)     Altered mental status, unspecified     Hepatic encephalopathy (H)     Urinary tract infection associated with catheterization of urinary tract (H)     Altered mental status     Hypoglycemia     Hepatic cirrhosis (H)     Osteoarthritis     Rheumatoid arthritis (H)     Hyperkalemia     Liver transplant recipient (H)     Acute kidney injury (H)     Immunosuppressed status (H)     SOCIAL /FAMILY HISTORY: [x]                  No recent change    Past Medical History:   Diagnosis Date     Acute venous embolism and thrombosis of other specified veins 11/01    Deep vein thrombophlebitis, filter placed     Cancer (H)      Depressive disorder, not elsewhere classified      Esophageal reflux      Fibromyalgia 1/2009    dx with Dr Benitez( Rheum)     Osteoarthritis      Other and unspecified hyperlipidemia      Other chronic nonalcoholic liver disease     Fatty liver      Other testicular hypofunction      Pneumonia, organism unspecified 10-01    Included ARDS, sepsis, and  acute renal failure; hospitalized     Rheumatoid arthritis(714.0)      Type II or unspecified type diabetes mellitus without mention of complication, not stated as uncontrolled 11/01    Managed by endocrinology     Unspecified essential hypertension 11/01    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     Unspecified sleep apnea     Uses BiPAP     Past Surgical History:   Procedure Laterality Date     BENCH LIVER N/A 3/4/2017    Procedure: BENCH LIVER;  Surgeon: Jovan Tran MD;  Location: [facility] OR     C NONSPECIFIC PROCEDURE      tracheostomy     C NONSPECIFIC PROCEDURE      repair of deviated septum     C NONSPECIFIC PROCEDURE  2007    Rt knee arthroscopy     C TOTAL  KNEE ARTHROPLASTY  2008    Right knee arthroscopy     CHOLECYSTECTOMY       ESOPHAGOSCOPY, GASTROSCOPY, DUODENOSCOPY (EGD), COMBINED N/A 2016    Procedure: COMBINED ESOPHAGOSCOPY, GASTROSCOPY, DUODENOSCOPY (EGD), BIOPSY SINGLE OR MULTIPLE;  Surgeon: Trent Pederson MD;  Location:  GI     TRANSPLANT LIVER RECIPIENT  DONOR N/A 3/4/2017    Procedure: TRANSPLANT LIVER RECIPIENT  DONOR;  Surgeon: Jovan Tran MD;  Location:  OR     Social History     Social History     Marital status:      Spouse name: N/A     Number of children: 1     Years of education: N/A     Occupational History            Social History Main Topics     Smoking status: Former Smoker     Smokeless tobacco: Former User     Types: Chew     Quit date: 10/8/2015      Comment: Has used chewing tobacco since age 15 , chewed for 20yrs      Alcohol use No      Comment: last drink about a year ago (quit )     Drug use: No     Sexual activity: Yes     Partners: Female     Other Topics Concern     Not on file     Social History Narrative    uSED TO BE      Back in school now         Prescription Medications as of 2017             oxyCODONE (ROXICODONE) 10 MG IR tablet Take 0.5 tablets (5 mg) by mouth every 4 hours as needed for moderate to severe pain    cyclobenzaprine (FLEXERIL) 10 MG tablet Take 0.5-1 tablets (5-10 mg) by mouth 3 times daily as needed for muscle spasms    clotrimazole (MYCELEX) 10 MG LOZG lozenge Place 1 lozenge (1 Beatrice) inside cheek 3 times daily    tacrolimus (PROGRAF - GENERIC EQUIVALENT) 1 MG capsule Take 6 capsules (6 mg) by mouth every 12 hours    mycophenolate (CELLCEPT - GENERIC EQUIVALENT) 250 MG capsule Take 4 capsules (1,000 mg) by mouth 2 times daily    furosemide (LASIX) 20 MG tablet Take 2 tablets (40 mg) by mouth daily    magnesium oxide (MAG-OX) 400 MG tablet Take 1 tablet (400 mg) by mouth 2 times daily    megestrol (MEGACE) 40  MG/ML suspension Take 10 mLs (400 mg) by mouth 2 times daily    cyclobenzaprine (FLEXERIL) 10 MG tablet Take 1 tablet (10 mg) by mouth 3 times daily as needed for muscle spasms    lidocaine (LIDODERM) 5 % Patch Place 1 patch onto the skin every 24 hours    LACTOBACILLUS EXTRA STRENGTH CAPS Take 1 capsule by mouth 2 times daily    prochlorperazine (COMPAZINE) 5 MG tablet Take 1-2 tablets (5-10 mg) by mouth every 6 hours as needed for nausea or vomiting    gabapentin (NEURONTIN) 300 MG capsule Take 1 capsule (300 mg) by mouth 3 times daily    oxyCODONE (ROXICODONE) 5 MG IR tablet Take 1-2 tablets (5-10 mg) by mouth every 4 hours as needed for moderate to severe pain    aspirin 325 MG tablet 1 tablet (325 mg) by Oral or Feeding Tube route daily    insulin aspart (NOVOLOG PEN) 100 UNIT/ML injection DOSE:  1 units per 8 grams of carbohydrate. With meals and snacks. Only chart total amount of units given.  Do not give if pre-prandial glucose is less than 60 mg/dL.    insulin glargine (LANTUS) 100 UNIT/ML injection Inject 45 Units Subcutaneous every 24 hours    valGANciclovir (VALCYTE) 450 MG tablet Take 1 tablet (450 mg) by mouth daily    senna-docusate (SENOKOT-S;PERICOLACE) 8.6-50 MG per tablet Take 2 tablets by mouth 2 times daily    sulfamethoxazole-trimethoprim (BACTRIM/SEPTRA) 400-80 MG per tablet Take 1 tablet by mouth daily    multivitamin, therapeutic with minerals (THERA-VIT-M) TABS tablet Take 1 tablet by mouth daily    insulin aspart (NOVOLOG PEN) 100 UNIT/ML injection Inject 1-10 Units Subcutaneous 3 times daily (before meals) For Pre-Meal  - 189 give 1 unit.   For Pre-Meal  - 239 give 2 units.   For Pre-Meal  - 289 give 3 units.   For Pre-Meal  - 339 give 4 units.   For Pre-Meal -399 give 5 units.  For Pre-Meal -449 give 6 units.  For Pre-Meal BG = or > 450 give 7 units.    insulin aspart (NOVOLOG PEN) 100 UNIT/ML injection Inject 1-7 Units Subcutaneous At Bedtime Bedtime  Blood Glucose (BG)  For  - 249 give 1 units.   For  - 299 give 2 units.   For  - 349 give 3 units.  For - 399 give 4 units.   For BG = or > 400 give 5 units.    ondansetron (ZOFRAN-ODT) 4 MG ODT tab Take 1 tablet (4 mg) by mouth every 8 hours as needed for nausea    omeprazole (PRILOSEC) 20 MG CR capsule Take 1 capsule (20 mg) by mouth 2 times daily (before meals)    glucagon 1 MG SOLR injection Inject 1 mg Subcutaneous every 15 minutes as needed for low blood sugar (May repeat x 1 only)        Erythromycin and Vioxx   REVIEW OF SYSTEMS (check box if normal)  [x]               GENERAL  [x]                 PULMONARY [x]                GENITOURINARY  [x]                CNS                 [x]                 CARDIAC  [x]                 ENDOCRINE  [x]                EARS,NOSE,THROAT [x]                 GASTROINTESTINAL [x]                 NEUROLOGIC    [x]                MUSCLOSKELTAL  [x]                  HEMATOLOGY      PHYSICAL EXAM (check box if normal)BP (!) 185/98 (BP Location: Right arm, Patient Position: Chair, Cuff Size: Adult Large)  Pulse 86  Temp 98.1  F (36.7  C) (Oral)  Resp 18  Ht 1.829 m (6')  Wt 111.6 kg (246 lb)  SpO2 99%  BMI 33.36 kg/m2        [x]            GENERAL:    [x]       EYES:  ICTERIC   []        YES  []                    NO  [x]           EXTREMITIES: Clubbing []       Y     [x]           N    [x]           EARS, NOSE, THROAT: Membranes Moist    YES   [x]                   NO []                  [x]           LUNGS:  CLEAR    YES       [x]                  NO    []                                [x]           SKIN: Jaundice           YES       []                  NO    [x]                   Rash: YES       []                  NO    [x]                                     [x]             HEART: Regular Rate          YES       [x]                  NO    []                   Incision Clean:  YES       [x]                  NO    []                                 [x]                    ABDOMEN: Wound healthy Organomegaly YES       []                  NO    [x]                       [x]                    NEUROLOGICAL:  Nonfocal  YES       [x]                  NO    []                       [x]                    Hernia YES       []                  NO    [x]                   PSYCHIATRIC:  Appropriate  YES       [x]                  NO    []                       OTHER:                                                                                                   PAIN SCALE:: 3    Again, thank you for allowing me to participate in the care of your patient.      Sincerely,    Jovan Tran MD

## 2017-04-17 NOTE — NURSING NOTE
Chief Complaint   Patient presents with     Liver Transplant     POD 44       Initial BP (!) 185/98 (BP Location: Right arm, Patient Position: Chair, Cuff Size: Adult Large)  Pulse 86  Temp 98.1  F (36.7  C) (Oral)  Resp 18  Ht 1.829 m (6')  Wt 111.6 kg (246 lb)  SpO2 99%  BMI 33.36 kg/m2 Estimated body mass index is 33.36 kg/(m^2) as calculated from the following:    Height as of this encounter: 1.829 m (6').    Weight as of this encounter: 111.6 kg (246 lb).  Medication Reconciliation: complete

## 2017-04-17 NOTE — PROGRESS NOTES
HPI      ROS      Physical Exam    Transplant Surgery -OUTPATIENT IMMUNOSUPPRESSION PROGRESS NOTE    Date of Visit: 04/17/2017    Transplants:  3/4/2017 (Liver); Postoperative day:  44  ASSESMENT AND PLAN:  1.Graft Function: Liver allograft: no rejection or technical problems.    2.Immunosuppression Management: keep tacrolimus levels at 8-10  3.Hypertension: ok  4.Renal Function: good  5.Lab frequency: twice weekly  6.Other:  No biliary stent on KUB and needs IVC filter needs removal by IR    Date: April 17, 2017    Transplant:  [x]                             Liver [x]                              Kidney []                             Pancreas []                              Other:             Chief Complaint:Liver Transplant (POD 44)  Doing well, no fever or abdominal pain  History of Present Illness:  Patient Active Problem List   Diagnosis     Diabetes mellitus, type 2 (H)     Carpal tunnel syndrome     Essential hypertension     Personal history of thrombophlebitis     Esophageal reflux     Depressive disorder, not elsewhere classified     Hyperlipidemia     Sleep apnea     Testicular hypofunction     TYPE 2 DIABETES, HBA1C GOAL < 7%     HYPERLIPIDEMIA LDL GOAL <100     Fibromyalgia     OA (osteoarthritis)     Cirrhosis (H)     Sicca syndrome (H)     Knee pain     Primary localized osteoarthrosis, lower leg     Cerebrovascular accident (H)     Diabetes mellitus with neurological manifestation (H)     Medication refill- do not delete      Pain medication agreement signed - Shay Kirkpatrick 2/7/14     Hepatocellular carcinoma (H)     Hepatic cirrhosis, unspecified hepatic cirrhosis type (H)     Preoperative cardiovascular examination     HCC (hepatocellular carcinoma) (H)     Edema     Cellulitis of scrotum     Uncontrolled type 2 diabetes mellitus with hyperglycemia, with long-term current use of insulin (H)     Debility     Type 2 diabetes mellitus without complication, with long-term current use of insulin (H)      Altered mental status, unspecified     Hepatic encephalopathy (H)     Urinary tract infection associated with catheterization of urinary tract (H)     Altered mental status     Hypoglycemia     Hepatic cirrhosis (H)     Osteoarthritis     Rheumatoid arthritis (H)     Hyperkalemia     Liver transplant recipient (H)     Acute kidney injury (H)     Immunosuppressed status (H)     SOCIAL /FAMILY HISTORY: [x]                  No recent change    Past Medical History:   Diagnosis Date     Acute venous embolism and thrombosis of other specified veins 11/01    Deep vein thrombophlebitis, filter placed     Cancer (H)      Depressive disorder, not elsewhere classified      Esophageal reflux      Fibromyalgia 1/2009    dx with Dr Benitez( Rheum)     Osteoarthritis      Other and unspecified hyperlipidemia      Other chronic nonalcoholic liver disease     Fatty liver      Other testicular hypofunction      Pneumonia, organism unspecified 10-01    Included ARDS, sepsis, and  acute renal failure; hospitalized     Rheumatoid arthritis(714.0)      Type II or unspecified type diabetes mellitus without mention of complication, not stated as uncontrolled 11/01    Managed by endocrinology     Unspecified essential hypertension 11/01    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     Unspecified sleep apnea     Uses BiPAP     Past Surgical History:   Procedure Laterality Date     BENCH LIVER N/A 3/4/2017    Procedure: BENCH LIVER;  Surgeon: Jovan Tran MD;  Location: [facility] OR     C NONSPECIFIC PROCEDURE      tracheostomy     C NONSPECIFIC PROCEDURE      repair of deviated septum     C NONSPECIFIC PROCEDURE  2007    Rt knee arthroscopy     C TOTAL KNEE ARTHROPLASTY  2008    Right knee arthroscopy     CHOLECYSTECTOMY       ESOPHAGOSCOPY, GASTROSCOPY, DUODENOSCOPY (EGD), COMBINED N/A 8/4/2016    Procedure: COMBINED ESOPHAGOSCOPY, GASTROSCOPY, DUODENOSCOPY (EGD), BIOPSY SINGLE OR MULTIPLE;  Surgeon: Trent Pederson MD;  Location:  [facility] GI     TRANSPLANT LIVER RECIPIENT  DONOR N/A 3/4/2017    Procedure: TRANSPLANT LIVER RECIPIENT  DONOR;  Surgeon: Jovan Tran MD;  Location:  OR     Social History     Social History     Marital status:      Spouse name: N/A     Number of children: 1     Years of education: N/A     Occupational History            Social History Main Topics     Smoking status: Former Smoker     Smokeless tobacco: Former User     Types: Chew     Quit date: 10/8/2015      Comment: Has used chewing tobacco since age 15 , chewed for 20yrs      Alcohol use No      Comment: last drink about a year ago (quit )     Drug use: No     Sexual activity: Yes     Partners: Female     Other Topics Concern     Not on file     Social History Narrative    uSED TO BE      Back in school now         Prescription Medications as of 2017             oxyCODONE (ROXICODONE) 10 MG IR tablet Take 0.5 tablets (5 mg) by mouth every 4 hours as needed for moderate to severe pain    cyclobenzaprine (FLEXERIL) 10 MG tablet Take 0.5-1 tablets (5-10 mg) by mouth 3 times daily as needed for muscle spasms    clotrimazole (MYCELEX) 10 MG LOZG lozenge Place 1 lozenge (1 Beatrice) inside cheek 3 times daily    tacrolimus (PROGRAF - GENERIC EQUIVALENT) 1 MG capsule Take 6 capsules (6 mg) by mouth every 12 hours    mycophenolate (CELLCEPT - GENERIC EQUIVALENT) 250 MG capsule Take 4 capsules (1,000 mg) by mouth 2 times daily    furosemide (LASIX) 20 MG tablet Take 2 tablets (40 mg) by mouth daily    magnesium oxide (MAG-OX) 400 MG tablet Take 1 tablet (400 mg) by mouth 2 times daily    megestrol (MEGACE) 40 MG/ML suspension Take 10 mLs (400 mg) by mouth 2 times daily    cyclobenzaprine (FLEXERIL) 10 MG tablet Take 1 tablet (10 mg) by mouth 3 times daily as needed for muscle spasms    lidocaine (LIDODERM) 5 % Patch Place 1 patch onto the skin every 24 hours    LACTOBACILLUS EXTRA STRENGTH CAPS  Take 1 capsule by mouth 2 times daily    prochlorperazine (COMPAZINE) 5 MG tablet Take 1-2 tablets (5-10 mg) by mouth every 6 hours as needed for nausea or vomiting    gabapentin (NEURONTIN) 300 MG capsule Take 1 capsule (300 mg) by mouth 3 times daily    oxyCODONE (ROXICODONE) 5 MG IR tablet Take 1-2 tablets (5-10 mg) by mouth every 4 hours as needed for moderate to severe pain    aspirin 325 MG tablet 1 tablet (325 mg) by Oral or Feeding Tube route daily    insulin aspart (NOVOLOG PEN) 100 UNIT/ML injection DOSE:  1 units per 8 grams of carbohydrate. With meals and snacks. Only chart total amount of units given.  Do not give if pre-prandial glucose is less than 60 mg/dL.    insulin glargine (LANTUS) 100 UNIT/ML injection Inject 45 Units Subcutaneous every 24 hours    valGANciclovir (VALCYTE) 450 MG tablet Take 1 tablet (450 mg) by mouth daily    senna-docusate (SENOKOT-S;PERICOLACE) 8.6-50 MG per tablet Take 2 tablets by mouth 2 times daily    sulfamethoxazole-trimethoprim (BACTRIM/SEPTRA) 400-80 MG per tablet Take 1 tablet by mouth daily    multivitamin, therapeutic with minerals (THERA-VIT-M) TABS tablet Take 1 tablet by mouth daily    insulin aspart (NOVOLOG PEN) 100 UNIT/ML injection Inject 1-10 Units Subcutaneous 3 times daily (before meals) For Pre-Meal  - 189 give 1 unit.   For Pre-Meal  - 239 give 2 units.   For Pre-Meal  - 289 give 3 units.   For Pre-Meal  - 339 give 4 units.   For Pre-Meal -399 give 5 units.  For Pre-Meal -449 give 6 units.  For Pre-Meal BG = or > 450 give 7 units.    insulin aspart (NOVOLOG PEN) 100 UNIT/ML injection Inject 1-7 Units Subcutaneous At Bedtime Bedtime Blood Glucose (BG)  For  - 249 give 1 units.   For  - 299 give 2 units.   For  - 349 give 3 units.  For - 399 give 4 units.   For BG = or > 400 give 5 units.    ondansetron (ZOFRAN-ODT) 4 MG ODT tab Take 1 tablet (4 mg) by mouth every 8 hours as needed for nausea     omeprazole (PRILOSEC) 20 MG CR capsule Take 1 capsule (20 mg) by mouth 2 times daily (before meals)    glucagon 1 MG SOLR injection Inject 1 mg Subcutaneous every 15 minutes as needed for low blood sugar (May repeat x 1 only)        Erythromycin and Vioxx   REVIEW OF SYSTEMS (check box if normal)  [x]               GENERAL  [x]                 PULMONARY [x]                GENITOURINARY  [x]                CNS                 [x]                 CARDIAC  [x]                 ENDOCRINE  [x]                EARS,NOSE,THROAT [x]                 GASTROINTESTINAL [x]                 NEUROLOGIC    [x]                MUSCLOSKELTAL  [x]                  HEMATOLOGY      PHYSICAL EXAM (check box if normal)BP (!) 185/98 (BP Location: Right arm, Patient Position: Chair, Cuff Size: Adult Large)  Pulse 86  Temp 98.1  F (36.7  C) (Oral)  Resp 18  Ht 1.829 m (6')  Wt 111.6 kg (246 lb)  SpO2 99%  BMI 33.36 kg/m2        [x]            GENERAL:    [x]       EYES:  ICTERIC   []        YES  []                    NO  [x]           EXTREMITIES: Clubbing []       Y     [x]           N    [x]           EARS, NOSE, THROAT: Membranes Moist    YES   [x]                   NO []                  [x]           LUNGS:  CLEAR    YES       [x]                  NO    []                                [x]           SKIN: Jaundice           YES       []                  NO    [x]                   Rash: YES       []                  NO    [x]                                     [x]             HEART: Regular Rate          YES       [x]                  NO    []                   Incision Clean:  YES       [x]                  NO    []                                [x]                    ABDOMEN: Wound healthy Organomegaly YES       []                  NO    [x]                       [x]                    NEUROLOGICAL:  Nonfocal  YES       [x]                  NO    []                       [x]                    Hernia YES       []                   NO    [x]                   PSYCHIATRIC:  Appropriate  YES       [x]                  NO    []                       OTHER:                                                                                                   PAIN SCALE:: 3

## 2017-04-17 NOTE — MR AVS SNAPSHOT
After Visit Summary   4/17/2017    Camacho Bhagat    MRN: [id number]           Patient Information     Date Of Birth          1965        Visit Information        Provider Department      4/17/2017 11:30 AM Jovan Tran MD [facility] Solid [facility] Transplant        Today's Diagnoses     Immunosuppression (H)    -  1       Follow-ups after your visit        Your next 10 appointments already scheduled     Apr 18, 2017  2:00 PM CDT   (Arrive by 1:45 PM)   Return Visit with Santo Gallegos MD   [facility] ([facility])    [address] [place] [address]  [address] [facility] [id number] [phone]            Apr 24, 2017  1:00 PM CDT   (Arrive by 12:45 PM)   Return General Liver with Toñito Camejo MD   [facility] Hepatology ([facility])    [address] [place] [address]  [address] [facility] [id number] [phone]            May 04, 2017  2:20 PM CDT   New Patient Visit with Toñito Rivas MD   [facility] ([facility])    [address]  [address]  [facility] [address]   [phone]            May 08, 2017 11:50 AM CDT   (Arrive by 11:35 AM)   Liver Return Post Op with Jovan Tran MD   [facility] ([facility])    [address] [place] [address]  [address] [facility] [id number] [phone]            May 11, 2017  3:00 PM CDT   (Arrive by 2:45 PM)   Return Visit with Shay Kirkpatrick MD   [facility] ([facility])    [address] [place] [address]  [address] [facility] [id number] [phone]              Who to contact     If you have questions or need follow up information about today's clinic visit or your schedule please contact [facility] SOLID ORGAN TRANSPLANT directly at [phone].  Normal or non-critical lab and imaging results will be communicated to you by MyChart, letter or phone  within 4 business days after the clinic has received the results. If you do not hear from us within 7 days, please contact the clinic through [employer] or phone. If you have a critical or abnormal lab result, we will notify you by phone as soon as possible.  Submit refill requests through [employer] or call your pharmacy and they will forward the refill request to us. Please allow 3 business days for your refill to be completed.          Additional Information About Your Visit        [employer] Information     [employer] gives you secure access to your electronic health record. If you see a primary care provider, you can also send messages to your care team and make appointments. If you have questions, please call your primary care clinic.  If you do not have a primary care provider, please call [phone] and they will assist you.        Care EveryWhere ID     This is your Care EveryWhere ID. This could be used by other organizations to access your [place] medical records  [id number]        Your Vitals Were     Pulse Temperature Respirations Height Pulse Oximetry BMI (Body Mass Index)    86 98.1  F (36.7  C) (Oral) 18 1.829 m (6') 99% 33.36 kg/m2       Blood Pressure from Last 3 Encounters:   04/17/17 (!) 185/98   03/26/17 141/90   03/23/17 118/74    Weight from Last 3 Encounters:   04/17/17 111.6 kg (246 lb)   03/26/17 107.2 kg (236 lb 5.3 oz)   03/16/17 103.3 kg (227 lb 12.8 oz)              Today, you had the following     No orders found for display       Primary Care Provider Office Phone # Fax #    Shay Kirkpatrick [phone]        [facility] [address] [facility] [address]  [facility] [address]        Thank you!     Thank you for choosing [facility]GAN TRANS[facility]  for your care. Our goal is always to provide you with excellent care. Hearing back from our patients is one way we can continue to improve our services. Please take a few minutes to complete the written survey that you may receive in the  mail after your visit with us. Thank you!             Your Updated Medication List - Protect others around you: Learn how to safely use, store and throw away your medicines at www.disposemymeds.org.          This list is accurate as of: 4/17/17 11:34 AM.  Always use your most recent med list.                   Brand Name Dispense Instructions for use    aspirin 325 MG tablet     180 tablet    1 tablet (325 mg) by Oral or Feeding Tube route daily       clotrimazole 10 MG Lozg lozenge    MYCELEX    70 each    Place 1 lozenge (1 Beatrice) inside cheek 3 times daily       * cyclobenzaprine 10 MG tablet    FLEXERIL    90 tablet    Take 1 tablet (10 mg) by mouth 3 times daily as needed for muscle spasms       * cyclobenzaprine 10 MG tablet    FLEXERIL    30 tablet    Take 0.5-1 tablets (5-10 mg) by mouth 3 times daily as needed for muscle spasms       furosemide 20 MG tablet    LASIX    30 tablet    Take 2 tablets (40 mg) by mouth daily       gabapentin 300 MG capsule    NEURONTIN    90 capsule    Take 1 capsule (300 mg) by mouth 3 times daily       glucagon 1 MG Solr injection     1 each    Inject 1 mg Subcutaneous every 15 minutes as needed for low blood sugar (May repeat x 1 only)       * insulin aspart 100 UNIT/ML injection    NovoLOG PEN    3 mL    DOSE:  1 units per 8 grams of carbohydrate. With meals and snacks. Only chart total amount of units given.  Do not give if pre-prandial glucose is less than 60 mg/dL.       * insulin aspart 100 UNIT/ML injection    NovoLOG PEN    3 mL    Inject 1-10 Units Subcutaneous 3 times daily (before meals) For Pre-Meal  - 189 give 1 unit.  For Pre-Meal  - 239 give 2 units.  For Pre-Meal  - 289 give 3 units.  For Pre-Meal  - 339 give 4 units.  For Pre-Meal -399 give 5 units. For Pre-Meal -449 give 6 units. For Pre-Meal BG = or > 450 give 7 units.       * insulin aspart 100 UNIT/ML injection    NovoLOG PEN    3 mL    Inject 1-7 Units Subcutaneous At  Bedtime Bedtime Blood Glucose (BG) For  - 249 give 1 units.  For  - 299 give 2 units.  For  - 349 give 3 units. For - 399 give 4 units.  For BG = or > 400 give 5 units.       insulin glargine 100 UNIT/ML injection    LANTUS    3 mL    Inject 45 Units Subcutaneous every 24 hours       LACTOBACILLUS EXTRA STRENGTH Caps     30 capsule    Take 1 capsule by mouth 2 times daily       lidocaine 5 % Patch    LIDODERM    30 patch    Place 1 patch onto the skin every 24 hours       magnesium oxide 400 MG tablet    MAG-OX    7 tablet    Take 1 tablet (400 mg) by mouth 2 times daily       megestrol 40 MG/ML suspension    MEGACE    600 mL    Take 10 mLs (400 mg) by mouth 2 times daily       multivitamin, therapeutic with minerals Tabs tablet     30 each    Take 1 tablet by mouth daily       mycophenolate 250 MG capsule    CELLCEPT - GENERIC EQUIVALENT    240 capsule    Take 4 capsules (1,000 mg) by mouth 2 times daily       omeprazole 20 MG CR capsule    priLOSEC    60 capsule    Take 1 capsule (20 mg) by mouth 2 times daily (before meals)       ondansetron 4 MG ODT tab    ZOFRAN-ODT    30 tablet    Take 1 tablet (4 mg) by mouth every 8 hours as needed for nausea       * oxyCODONE 5 MG IR tablet    ROXICODONE    40 tablet    Take 1-2 tablets (5-10 mg) by mouth every 4 hours as needed for moderate to severe pain       * oxyCODONE 10 MG IR tablet    ROXICODONE    60 tablet    Take 0.5 tablets (5 mg) by mouth every 4 hours as needed for moderate to severe pain       prochlorperazine 5 MG tablet    COMPAZINE    90 tablet    Take 1-2 tablets (5-10 mg) by mouth every 6 hours as needed for nausea or vomiting       senna-docusate 8.6-50 MG per tablet    SENOKOT-S;PERICOLACE    100 tablet    Take 2 tablets by mouth 2 times daily       sulfamethoxazole-trimethoprim 400-80 MG per tablet    BACTRIM/SEPTRA    30 tablet    Take 1 tablet by mouth daily       tacrolimus 1 MG capsule    PROGRAF - GENERIC EQUIVALENT    360  capsule    Take 6 capsules (6 mg) by mouth every 12 hours       valGANciclovir 450 MG tablet    VALCYTE    60 tablet    Take 1 tablet (450 mg) by mouth daily       * Notice:  This list has 7 medication(s) that are the same as other medications prescribed for you. Read the directions carefully, and ask your doctor or other care provider to review them with you.

## 2017-04-18 ENCOUNTER — OFFICE VISIT (OUTPATIENT)
Dept: ORTHOPEDICS | Facility: CLINIC | Age: 52
End: 2017-04-18

## 2017-04-18 VITALS — BODY MASS INDEX: 33.32 KG/M2 | HEIGHT: 72 IN | WEIGHT: 246 LBS

## 2017-04-18 DIAGNOSIS — G89.29 CHRONIC MIDLINE LOW BACK PAIN WITHOUT SCIATICA: ICD-10-CM

## 2017-04-18 DIAGNOSIS — M54.2 CERVICALGIA: Primary | ICD-10-CM

## 2017-04-18 DIAGNOSIS — G89.29 CHRONIC MIDLINE THORACIC BACK PAIN: ICD-10-CM

## 2017-04-18 DIAGNOSIS — M54.50 CHRONIC MIDLINE LOW BACK PAIN WITHOUT SCIATICA: ICD-10-CM

## 2017-04-18 DIAGNOSIS — M54.6 CHRONIC MIDLINE THORACIC BACK PAIN: ICD-10-CM

## 2017-04-18 NOTE — MR AVS SNAPSHOT
After Visit Summary   4/18/2017    Camacho Bhagat    MRN: [id number]           Patient Information     Date Of Birth          1965        Visit Information        Provider Department      4/18/2017 2:00 PM Santo Gallegos MD [facility]        Today's Diagnoses     Cervicalgia    -  1    Chronic midline thoracic back pain        Chronic midline low back pain without sciatica           Follow-ups after your visit        Additional Services     [facility] PT, HAND, AND CHIROPRACTIC REFERRAL       === This order will print in the [facility] Scheduling  Office ===    Physical therapy, hand therapy and chiropractic care are available through:    [facility]  [facility]    Call one easy number to schedule at any of the above locations:  [phone].    Your provider has referred you to Physical Therapy at [facility] or [facility] PT    Indication/Reason for Referral: Low Back Pain and Neck Pain  Onset of Illness:  Recurrent x 2 yrs.  No radicular pain.    Therapy Orders:  Evaluate and Treat  Special Programs:  None  Special Request:  None    Additional Comments for the therapist or chiropractor:  Has responded in past to lumbar traction and to ultrasound.  Needs to avoid core strength training in setting of recent liver transplant.  Could to home traction unit if helpful  10-12 visits    Please be aware that coverage of these services is subject to the terms and limitations of your health insurance plan.  Call member services at your health plan with any benefit or coverage questions.      Please bring the following to your appointment:    >>   Your personal calendar for scheduling future appointments  >>   Comfortable clothing            [facility] PT, HAND, AND CHIROPRACTIC REFERRAL       === This order will print in the [facility] Scheduling  Office ===    Physical therapy, hand therapy and chiropractic care are available  through:    [place] [facility]  [facility]    Call one easy number to schedule at any of the above locations:  [phone].    Your provider has referred you to Physical Therapy at [facility]    Indication/Reason for Referral: Low Back Pain  Onset of Illness:  Recurrent x 20 yrs  Therapy Orders:  Evaluate and Treat  Special Programs:  None  Special Request:  None    Additional Comments for the therapist or chiropractor:  pilates reformer approach to low back pain, once pt has recovered from liver transplant surgery    Please be aware that coverage of these services is subject to the terms and limitations of your health insurance plan.  Call member services at your health plan with any benefit or coverage questions.      Please bring the following to your appointment:    >>   Your personal calendar for scheduling future appointments  >>   Comfortable clothing                  Your next 10 appointments already scheduled     Apr 24, 2017  1:00 PM CDT   (Arrive by 12:45 PM)   Return General Liver with Toñito Camejo MD   [facility] ([facility])    [address] [place] [id number] [phone]            May 04, 2017  2:20 PM CDT   New Patient Visit with Toñito Rivas MD   [facility] ([facility])    [address]  [facility] [address]   [phone]            May 08, 2017 11:50 AM CDT   (Arrive by 11:35 AM)   Liver Return Post Op with Jovan Tran MD   [facility] Organ Transplant ([facility])    [address] [place] [address]   [phone]            May 11, 2017  3:00 PM CDT   (Arrive by 2:45 PM)   Return Visit with Shay Kirkpatrick MD   [facility] ([facility])    [address] [place] [address]  [facility] [address] [phone]              Future tests that were ordered for you today     Open Future Orders        Priority Expected Expires Ordered    IR IVC Filter Removal Routine 4/24/2017 5/31/2017 4/19/2017            Who to contact     Please call your clinic at [phone] to:    Ask questions about your health    Make or cancel appointments    Discuss your medicines    Learn about your test results    Speak to your doctor   If you have compliments or concerns about an experience at your clinic, or if you wish to file a complaint, please contact [facility] Patient Relations at [phone] or email us at [email or web address]         Additional Information About Your Visit        [employer] Information     [employer] gives you secure access to your electronic health record. If you see a primary care provider, you can also send messages to your care team and make appointments. If you have questions, please call your primary care clinic.  If you do not have a primary care provider, please call [phone] and they will assist you.      [employer] is an electronic gateway that provides easy, online access to your medical records. With [employer], you can request a clinic appointment, read your test results, renew a prescription or communicate with your care team.     To access your existing account, please contact your [facility] or call [phone] for assistance.        Care EveryWhere ID     This is your Care EveryWhere ID. This could be used by other organizations to access your [place] medical records  [id number]        Your Vitals Were     Height BMI (Body Mass Index)                6' (1.829 m) 33.36 kg/m2           Blood Pressure from Last 3 Encounters:   04/17/17 (!) 185/98   03/26/17 141/90   03/23/17 118/74    Weight from Last 3 Encounters:   04/18/17 246 lb (111.6 kg)   04/17/17 246 lb (111.6 kg)   03/26/17 236 lb 5.3  oz (107.2 kg)              We Performed the Following     [facility] PT, HAND, AND CHIROPRACTIC REFERRAL     [facility] PT, HAND, AND CHIROPRACTIC REFERRAL        Primary Care Provider Office Phone # Fax #    Shay Kirkpatrick [phone]        [facility] [address] [address]  [facility] [address]        Thank you!     Thank you for choosing [facility]  for your care. Our goal is always to provide you with excellent care. Hearing back from our patients is one way we can continue to improve our services. Please take a few minutes to complete the written survey that you may receive in the mail after your visit with us. Thank you!             Your Updated Medication List - Protect others around you: Learn how to safely use, store and throw away your medicines at www.disposemymeds.org.          This list is accurate as of: 4/18/17 11:59 PM.  Always use your most recent med list.                   Brand Name Dispense Instructions for use    aspirin 325 MG tablet     180 tablet    1 tablet (325 mg) by Oral or Feeding Tube route daily       clotrimazole 10 MG Lozg lozenge    MYCELEX    70 each    Place 1 lozenge (1 Beatrice) inside cheek 3 times daily       * cyclobenzaprine 10 MG tablet    FLEXERIL    90 tablet    Take 1 tablet (10 mg) by mouth 3 times daily as needed for muscle spasms       * cyclobenzaprine 10 MG tablet    FLEXERIL    30 tablet    Take 0.5-1 tablets (5-10 mg) by mouth 3 times daily as needed for muscle spasms       furosemide 20 MG tablet    LASIX    30 tablet    Take 2 tablets (40 mg) by mouth daily       gabapentin 300 MG capsule    NEURONTIN    90 capsule    Take 1 capsule (300 mg) by mouth 3 times daily       glucagon 1 MG Solr injection     1 each    Inject 1 mg Subcutaneous every 15 minutes as needed for low blood sugar (May repeat x 1 only)       * insulin aspart 100 UNIT/ML injection    NovoLOG PEN    3 mL    DOSE:  1 units per 8 grams of carbohydrate. With meals and snacks.  Only chart total amount of units given.  Do not give if pre-prandial glucose is less than 60 mg/dL.       * insulin aspart 100 UNIT/ML injection    NovoLOG PEN    3 mL    Inject 1-10 Units Subcutaneous 3 times daily (before meals) For Pre-Meal  - 189 give 1 unit.  For Pre-Meal  - 239 give 2 units.  For Pre-Meal  - 289 give 3 units.  For Pre-Meal  - 339 give 4 units.  For Pre-Meal -399 give 5 units. For Pre-Meal -449 give 6 units. For Pre-Meal BG = or > 450 give 7 units.       * insulin aspart 100 UNIT/ML injection    NovoLOG PEN    3 mL    Inject 1-7 Units Subcutaneous At Bedtime Bedtime Blood Glucose (BG) For  - 249 give 1 units.  For  - 299 give 2 units.  For  - 349 give 3 units. For - 399 give 4 units.  For BG = or > 400 give 5 units.       insulin glargine 100 UNIT/ML injection    LANTUS    3 mL    Inject 45 Units Subcutaneous every 24 hours       LACTOBACILLUS EXTRA STRENGTH Caps     30 capsule    Take 1 capsule by mouth 2 times daily       lidocaine 5 % Patch    LIDODERM    30 patch    Place 1 patch onto the skin every 24 hours       magnesium oxide 400 MG tablet    MAG-OX    7 tablet    Take 1 tablet (400 mg) by mouth 2 times daily       megestrol 40 MG/ML suspension    MEGACE    600 mL    Take 10 mLs (400 mg) by mouth 2 times daily       multivitamin, therapeutic with minerals Tabs tablet     30 each    Take 1 tablet by mouth daily       mycophenolate 250 MG capsule    CELLCEPT - GENERIC EQUIVALENT    240 capsule    Take 4 capsules (1,000 mg) by mouth 2 times daily       omeprazole 20 MG CR capsule    priLOSEC    60 capsule    Take 1 capsule (20 mg) by mouth 2 times daily (before meals)       ondansetron 4 MG ODT tab    ZOFRAN-ODT    30 tablet    Take 1 tablet (4 mg) by mouth every 8 hours as needed for nausea       * oxyCODONE 5 MG IR tablet    ROXICODONE    40 tablet    Take 1-2 tablets (5-10 mg) by mouth every 4 hours as needed for moderate to  severe pain       * oxyCODONE 10 MG IR tablet    ROXICODONE    60 tablet    Take 0.5 tablets (5 mg) by mouth every 4 hours as needed for moderate to severe pain       prochlorperazine 5 MG tablet    COMPAZINE    90 tablet    Take 1-2 tablets (5-10 mg) by mouth every 6 hours as needed for nausea or vomiting       senna-docusate 8.6-50 MG per tablet    SENOKOT-S;PERICOLACE    100 tablet    Take 2 tablets by mouth 2 times daily       sulfamethoxazole-trimethoprim 400-80 MG per tablet    BACTRIM/SEPTRA    30 tablet    Take 1 tablet by mouth daily       tacrolimus 1 MG capsule    PROGRAF - GENERIC EQUIVALENT    360 capsule    Take 6 capsules (6 mg) by mouth every 12 hours       valGANciclovir 450 MG tablet    VALCYTE    60 tablet    Take 1 tablet (450 mg) by mouth daily       * Notice:  This list has 7 medication(s) that are the same as other medications prescribed for you. Read the directions carefully, and ask your doctor or other care provider to review them with you.

## 2017-04-18 NOTE — PROGRESS NOTES
Sports Medicine Clinic Visit    PCP: Shay Kirkpatrick    Camacho Bhagat is a 51 year old male who is seen at request of Dr. Tran in transplant clinic in consultation presenting with chronic recurrent back pain.  Pt is s/p  liver txp DOS 3/4/17. Saw PT per transplant doctor which aggravated back sx.     Injury: none      MR LUMBAR SPINE W/O & W CONTRAST 3/18/2017 4:50 PM     Provided History: Low back pain.  Additional history obtained from patient's chart: Liver transplant.     Comparison: CT abdomen and pelvis 3/5/2017     Technique: Sagittal T1-weighted and T2-weighted and axial T2-weighted  images of the lumbar spine were obtained without intravenous contrast.  Post intravenous contrast using gadolinium axial and sagittal  T1-weighted images were obtained with fat saturation.     Contrast: 10ml Gadavist     Findings: Regarding numbering convention, there are 5 lumbar-type  vertebrae assumed for the purposes of this dictation. The tip of the  conus medullaris is at L1. Regarding alignment, the lumbar vertebral  column appears normally aligned. There is mild disc height narrowing  L5-S1. Regarding bone marrow signal intensity, no abnormality is  visualized on STIR images.     On a level by level basis:     L1-2: No significant spinal canal or foraminal narrowing. Slight disc  bulge.     L2-3: No significant spinal canal or foraminal narrowing.     L3-4: No significant spinal canal or foraminal narrowing.     L4-5: Mild disc bulge with bilateral facet joint hypertrophy greater  on the right. Mild right greater than left neural foraminal narrowing.  There is fluid signal with mild enhancement seen within the right  facet joint.     L5-S1: Disc bulge with bilateral facet joint hypertrophy. There is  mild right and mild-to-moderate left neural foraminal narrowing. There  is mild fluid signal within the bilateral facet joints.     There is ascites seen in the pelvis. There is diffuse edema of the  subcutaneous fat  consistent with anasarca.          Impression:   1. Relatively mild multilevel degenerative changes with at most mild  to moderate neural foraminal narrowing L5-S1 on the left.  2. Multilevel facet joint arthritis greatest at L4-5 on the right  where there is fluid within the facet joint associated with mild  enhancement. This is consistent with mild active inflammatory  arthropathy.  3. Ascites and anasarca.     I have personally reviewed the examination and initial interpretation  and I agree with the findings.     CASSIDY GRANT MD          Exam: Scoliosis series.     History: Pain.     Techniques: AP and lateral composite images of full spine were  submitted for interpretation.     Comparison: None.     Findings:     12 rib bearing vertebral bodies and 5 lumbar type vertebral bodies are  identified.     Coronal Deformity:     No substantial coronal curvature deformity.     Sagittal Vertical Axis (A vertical line drawn from the center of C7  (moises line) to the posterosuperior aspect of the S1 on sagittal  plane): positive      Additional Findings:     Mild multilevel degenerative changes of spine. Surgical clips in the  right upper quadrant. Surgical staples across the abdomen. Surgical  drain in the right abdomen. Bony contour deformity of bilateral mid  femur. Degenerative change of bilateral acromioclavicular joints.     Os trigonum.     No acute osseous abnormality. There is a nonobstructive bowel gas  pattern.         Impression:  1. Positive global sagittal imbalance.  2. Bony contour deformity of bilateral mid femurs, possibly from  technique related artifact. Consider dedicated femur radiographs.     ADILIA NIELSON                Ht 6' (1.829 m)  Wt 246 lb (111.6 kg)  BMI 33.36 kg/m2         PMH:  Past Medical History:   Diagnosis Date     Acute venous embolism and thrombosis of other specified veins 11/01    Deep vein thrombophlebitis, filter placed     Cancer (H)      Depressive disorder, not  elsewhere classified      Esophageal reflux      Fibromyalgia 1/2009    dx with Dr Benitez( Rheum)     Osteoarthritis      Other and unspecified hyperlipidemia      Other chronic nonalcoholic liver disease     Fatty liver      Other testicular hypofunction      Pneumonia, organism unspecified 10-01    Included ARDS, sepsis, and  acute renal failure; hospitalized     Rheumatoid arthritis(714.0)      Type II or unspecified type diabetes mellitus without mention of complication, not stated as uncontrolled 11/01    Managed by endocrinology     Unspecified essential hypertension 11/01    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     Unspecified sleep apnea     Uses BiPAP       Active problem list:  Patient Active Problem List   Diagnosis     Diabetes mellitus, type 2 (H)     Carpal tunnel syndrome     Essential hypertension     Personal history of thrombophlebitis     Esophageal reflux     Depressive disorder, not elsewhere classified     Hyperlipidemia     Sleep apnea     Testicular hypofunction     TYPE 2 DIABETES, HBA1C GOAL < 7%     HYPERLIPIDEMIA LDL GOAL <100     Fibromyalgia     OA (osteoarthritis)     Cirrhosis (H)     Sicca syndrome (H)     Knee pain     Primary localized osteoarthrosis, lower leg     Cerebrovascular accident (H)     Diabetes mellitus with neurological manifestation (H)     Medication refill- do not delete      Pain medication agreement signed - Shay Kirkpatrick 2/7/14     Hepatocellular carcinoma (H)     Hepatic cirrhosis, unspecified hepatic cirrhosis type (H)     Preoperative cardiovascular examination     HCC (hepatocellular carcinoma) (H)     Edema     Cellulitis of scrotum     Uncontrolled type 2 diabetes mellitus with hyperglycemia, with long-term current use of insulin (H)     Debility     Type 2 diabetes mellitus without complication, with long-term current use of insulin (H)     Altered mental status, unspecified     Hepatic encephalopathy (H)     Urinary tract infection associated with  catheterization of urinary tract (H)     Altered mental status     Hypoglycemia     Hepatic cirrhosis (H)     Osteoarthritis     Rheumatoid arthritis (H)     Hyperkalemia     Liver transplant recipient (H)     Acute kidney injury (H)     Immunosuppressed status (H)       FH:  Family History   Problem Relation Age of Onset     DIABETES Father      Hypertension Father      Substance Abuse Father      Arthritis Mother      CANCER Mother      Thyroid     Thyroid Disease Mother      Other Cancer Mother      Colon Cancer No family hx of      Hyperlipidemia No family hx of      Coronary Artery Disease No family hx of      CEREBROVASCULAR DISEASE No family hx of      Breast Cancer No family hx of      Prostate Cancer No family hx of        SH:  Social History     Social History     Marital status:      Spouse name: N/A     Number of children: 1     Years of education: N/A     Occupational History            Social History Main Topics     Smoking status: Former Smoker     Smokeless tobacco: Former User     Types: Chew     Quit date: 10/8/2015      Comment: Has used chewing tobacco since age 15 , chewed for 20yrs      Alcohol use No      Comment: last drink about a year ago (quit 2001)     Drug use: No     Sexual activity: Yes     Partners: Female     Other Topics Concern     Not on file     Social History Narrative    uSED TO BE      Back in school now           MEDS:  See EMR, reviewed  ALL:  See EMR, reviewed    REVIEW OF SYSTEMS:   CONSTITUTIONAL:NEGATIVE for fever, chills, change in weight  INTEGUMENTARY/SKIN: NEGATIVE for worrisome rashes, moles or lesions  EYES: NEGATIVE for vision changes or irritation  ENT/MOUTH: NEGATIVE for ear, mouth and throat problems  RESP:NEGATIVE for significant cough or SOB  BREAST: NEGATIVE for masses, tenderness or discharge  CV: NEGATIVE for chest pain, palpitations or peripheral edema  GI: NEGATIVE for nausea, abdominal pain, heartburn, or  "change in bowel habits  :NEGATIVE for frequency, dysuria, or hematuria  :NEGATIVE for frequency, dysuria, or hematuria  NEURO: NEGATIVE for weakness, dizziness or paresthesias  ENDOCRINE: NEGATIVE for temperature intolerance, skin/hair changes  HEME/ALLERGY/IMMUNE: NEGATIVE for bleeding problems  PSYCHIATRIC: NEGATIVE for changes in mood or affect        INR 1.16  3/8/17          SUBJECTIVE:  This 51-year-old male has a history of chronic back pain that he has had recurrent \"for the past 20 years\" that he feels from the skull down to the tip of his tailbone.  It can bother him with standing and positioning and moving.  It is currently not associated with radicular discomfort down either his legs or his arms.  He does not have peripheral numbness or tingling.  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  He had a number of imaging studies of his back including a recent lumbar MRI that does show no signs of significant stenosis or impingement but there are some degenerative changes in the facets at L4-L5 and L5-S1.  He has had a complete spine film view that also shows some thoracic kyphosis but there are no signs of compression fracture deformities.  He is status post a liver transplant.  He has been told that he needs to avoid any kind of core strengthening exercises for about a month until he has recovered from his transplant.  In the past, he has seen physical therapy for his back in remote years and also had both lumbar traction at one point and ultrasound done for the back which he found to be helpful.  He is hoping he could pursue some lumbar traction and ultrasound for pain relief and when he recovers from his transplant he could do some more formalized training for his back.  He has an interest in considering yoga and also Pilates approaches as a way to train the back.      OBJECTIVE:  He is without radicular discomfort in either his upper extremities or lower " extremities today.  A straight leg raise is negative.  Lower extremity strength to flexion/extension at hip, knee, ankle including toe strength and foot evertor strength are 5 out of 5 symmetrically bilaterally.  He has good range of motion at the bilateral hips.  He is nontender over the lumbar spine or sacroiliac joints.      ASSESSMENT:  Lumbar spine degenerative disk and joint disease with chronic neck and low back discomfort.      PLAN:  I think it is reasonable to try these modalities for the back currently and I will refer him to physical therapy for this when he has recovered from his recent liver transplant.  I gave him the option of trying a Pilates approach with a [employer] machine that he would be able to transfer to a Reformer machine in a health club.  The patient expressed interest in this or he may pursue yoga which also would be reasonable.  He will follow up if these conservative cares are not helpful.

## 2017-04-18 NOTE — LETTER
4/18/2017      RE: Camacho Bhagat  [address]  [facility] [address]       Sports Medicine Clinic Visit    PCP: Shay Kirkpatrick    Camacho Bhagat is a 51 year old male who is seen at request of Dr. Tran in transplant clinic in consultation presenting with chronic recurrent back pain.  Pt is s/p  liver txp DOS 3/4/17. Saw PT per transplant doctor which aggravated back sx.     Injury: none      MR LUMBAR SPINE W/O & W CONTRAST 3/18/2017 4:50 PM     Provided History: Low back pain.  Additional history obtained from patient's chart: Liver transplant.     Comparison: CT abdomen and pelvis 3/5/2017     Technique: Sagittal T1-weighted and T2-weighted and axial T2-weighted  images of the lumbar spine were obtained without intravenous contrast.  Post intravenous contrast using gadolinium axial and sagittal  T1-weighted images were obtained with fat saturation.     Contrast: 10ml Gadavist     Findings: Regarding numbering convention, there are 5 lumbar-type  vertebrae assumed for the purposes of this dictation. The tip of the  conus medullaris is at L1. Regarding alignment, the lumbar vertebral  column appears normally aligned. There is mild disc height narrowing  L5-S1. Regarding bone marrow signal intensity, no abnormality is  visualized on STIR images.     On a level by level basis:     L1-2: No significant spinal canal or foraminal narrowing. Slight disc  bulge.     L2-3: No significant spinal canal or foraminal narrowing.     L3-4: No significant spinal canal or foraminal narrowing.     L4-5: Mild disc bulge with bilateral facet joint hypertrophy greater  on the right. Mild right greater than left neural foraminal narrowing.  There is fluid signal with mild enhancement seen within the right  facet joint.     L5-S1: Disc bulge with bilateral facet joint hypertrophy. There is  mild right and mild-to-moderate left neural foraminal narrowing. There  is mild fluid signal within the bilateral facet  joints.     There is ascites seen in the pelvis. There is diffuse edema of the  subcutaneous fat consistent with anasarca.          Impression:   1. Relatively mild multilevel degenerative changes with at most mild  to moderate neural foraminal narrowing L5-S1 on the left.  2. Multilevel facet joint arthritis greatest at L4-5 on the right  where there is fluid within the facet joint associated with mild  enhancement. This is consistent with mild active inflammatory  arthropathy.  3. Ascites and anasarca.     I have personally reviewed the examination and initial interpretation  and I agree with the findings.     CASSIDY GRANT MD          Exam: Scoliosis series.     History: Pain.     Techniques: AP and lateral composite images of full spine were  submitted for interpretation.     Comparison: None.     Findings:     12 rib bearing vertebral bodies and 5 lumbar type vertebral bodies are  identified.     Coronal Deformity:     No substantial coronal curvature deformity.     Sagittal Vertical Axis (A vertical line drawn from the center of C7  (moises line) to the posterosuperior aspect of the S1 on sagittal  plane): positive      Additional Findings:     Mild multilevel degenerative changes of spine. Surgical clips in the  right upper quadrant. Surgical staples across the abdomen. Surgical  drain in the right abdomen. Bony contour deformity of bilateral mid  femur. Degenerative change of bilateral acromioclavicular joints.     Os trigonum.     No acute osseous abnormality. There is a nonobstructive bowel gas  pattern.         Impression:  1. Positive global sagittal imbalance.  2. Bony contour deformity of bilateral mid femurs, possibly from  technique related artifact. Consider dedicated femur radiographs.     ADILIA NIELSON                Ht 6' (1.829 m)  Wt 246 lb (111.6 kg)  BMI 33.36 kg/m2         PMH:  Past Medical History:   Diagnosis Date     Acute venous embolism and thrombosis of other specified veins  11/01    Deep vein thrombophlebitis, filter placed     Cancer (H)      Depressive disorder, not elsewhere classified      Esophageal reflux      Fibromyalgia 1/2009    dx with Dr Benitez( Rheum)     Osteoarthritis      Other and unspecified hyperlipidemia      Other chronic nonalcoholic liver disease     Fatty liver      Other testicular hypofunction      Pneumonia, organism unspecified 10-01    Included ARDS, sepsis, and  acute renal failure; hospitalized     Rheumatoid arthritis(714.0)      Type II or unspecified type diabetes mellitus without mention of complication, not stated as uncontrolled 11/01    Managed by endocrinology     Unspecified essential hypertension 11/01    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     Unspecified sleep apnea     Uses BiPAP       Active problem list:  Patient Active Problem List   Diagnosis     Diabetes mellitus, type 2 (H)     Carpal tunnel syndrome     Essential hypertension     Personal history of thrombophlebitis     Esophageal reflux     Depressive disorder, not elsewhere classified     Hyperlipidemia     Sleep apnea     Testicular hypofunction     TYPE 2 DIABETES, HBA1C GOAL < 7%     HYPERLIPIDEMIA LDL GOAL <100     Fibromyalgia     OA (osteoarthritis)     Cirrhosis (H)     Sicca syndrome (H)     Knee pain     Primary localized osteoarthrosis, lower leg     Cerebrovascular accident (H)     Diabetes mellitus with neurological manifestation (H)     Medication refill- do not delete      Pain medication agreement signed - Shay Kirkpatrick 2/7/14     Hepatocellular carcinoma (H)     Hepatic cirrhosis, unspecified hepatic cirrhosis type (H)     Preoperative cardiovascular examination     HCC (hepatocellular carcinoma) (H)     Edema     Cellulitis of scrotum     Uncontrolled type 2 diabetes mellitus with hyperglycemia, with long-term current use of insulin (H)     Debility     Type 2 diabetes mellitus without complication, with long-term current use of insulin (H)     Altered mental  status, unspecified     Hepatic encephalopathy (H)     Urinary tract infection associated with catheterization of urinary tract (H)     Altered mental status     Hypoglycemia     Hepatic cirrhosis (H)     Osteoarthritis     Rheumatoid arthritis (H)     Hyperkalemia     Liver transplant recipient (H)     Acute kidney injury (H)     Immunosuppressed status (H)       FH:  Family History   Problem Relation Age of Onset     DIABETES Father      Hypertension Father      Substance Abuse Father      Arthritis Mother      CANCER Mother      Thyroid     Thyroid Disease Mother      Other Cancer Mother      Colon Cancer No family hx of      Hyperlipidemia No family hx of      Coronary Artery Disease No family hx of      CEREBROVASCULAR DISEASE No family hx of      Breast Cancer No family hx of      Prostate Cancer No family hx of        SH:  Social History     Social History     Marital status:      Spouse name: N/A     Number of children: 1     Years of education: N/A     Occupational History            Social History Main Topics     Smoking status: Former Smoker     Smokeless tobacco: Former User     Types: Chew     Quit date: 10/8/2015      Comment: Has used chewing tobacco since age 15 , chewed for 20yrs      Alcohol use No      Comment: last drink about a year ago (quit 2001)     Drug use: No     Sexual activity: Yes     Partners: Female     Other Topics Concern     Not on file     Social History Narrative    uSED TO BE      Back in school now           MEDS:  See EMR, reviewed  ALL:  See EMR, reviewed    REVIEW OF SYSTEMS:   CONSTITUTIONAL:NEGATIVE for fever, chills, change in weight  INTEGUMENTARY/SKIN: NEGATIVE for worrisome rashes, moles or lesions  EYES: NEGATIVE for vision changes or irritation  ENT/MOUTH: NEGATIVE for ear, mouth and throat problems  RESP:NEGATIVE for significant cough or SOB  BREAST: NEGATIVE for masses, tenderness or discharge  CV: NEGATIVE for chest  "pain, palpitations or peripheral edema  GI: NEGATIVE for nausea, abdominal pain, heartburn, or change in bowel habits  :NEGATIVE for frequency, dysuria, or hematuria  :NEGATIVE for frequency, dysuria, or hematuria  NEURO: NEGATIVE for weakness, dizziness or paresthesias  ENDOCRINE: NEGATIVE for temperature intolerance, skin/hair changes  HEME/ALLERGY/IMMUNE: NEGATIVE for bleeding problems  PSYCHIATRIC: NEGATIVE for changes in mood or affect        INR 1.16  3/8/17          SUBJECTIVE:  This 51-year-old male has a history of chronic back pain that he has had recurrent \"for the past 20 years\" that he feels from the skull down to the tip of his tailbone.  It can bother him with standing and positioning and moving.  It is currently not associated with radicular discomfort down either his legs or his arms.  He does not have peripheral numbness or tingling.  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  He had a number of imaging studies of his back including a recent lumbar MRI that does show no signs of significant stenosis or impingement but there are some degenerative changes in the facets at L4-L5 and L5-S1.  He has had a complete spine film view that also shows some thoracic kyphosis but there are no signs of compression fracture deformities.  He is status post a liver transplant.  He has been told that he needs to avoid any kind of core strengthening exercises for about a month until he has recovered from his transplant.  In the past, he has seen physical therapy for his back in remote years and also had both lumbar traction at one point and ultrasound done for the back which he found to be helpful.  He is hoping he could pursue some lumbar traction and ultrasound for pain relief and when he recovers from his transplant he could do some more formalized training for his back.  He has an interest in considering yoga and also Pilates approaches as a way to train the back.    "   OBJECTIVE:  He is without radicular discomfort in either his upper extremities or lower extremities today.  A straight leg raise is negative.  Lower extremity strength to flexion/extension at hip, knee, ankle including toe strength and foot evertor strength are 5 out of 5 symmetrically bilaterally.  He has good range of motion at the bilateral hips.  He is nontender over the lumbar spine or sacroiliac joints.      ASSESSMENT:  Lumbar spine degenerative disk and joint disease with chronic neck and low back discomfort.      PLAN:  I think it is reasonable to try these modalities for the back currently and I will refer him to physical therapy for this when he has recovered from his recent liver transplant.  I gave him the option of trying a Pilates approach with a Reformer machine that he would be able to transfer to a Reformer machine in a health club.  The patient expressed interest in this or he may pursue yoga which also would be reasonable.  He will follow up if these conservative cares are not helpful.       Santo Gallegos MD

## 2017-04-19 DIAGNOSIS — Z79.60 LONG-TERM USE OF IMMUNOSUPPRESSANT MEDICATION: ICD-10-CM

## 2017-04-19 DIAGNOSIS — Z94.4 HISTORY OF LIVER TRANSPLANT (H): Primary | ICD-10-CM

## 2017-04-19 NOTE — PROGRESS NOTES
Spoke with Camacho, he will be going to do his post transplant labs tomorrow, reminded him to not take his tacrolimus before going in for the labs, (has forgotten the past 2 weeks, have no accurate tacrolimus level)  Pt verbalizes understanding, also aware that he will receive a phone call about the removal of the IVC filter

## 2017-04-19 NOTE — Clinical Note
Jon Dominguez,  I am sending you this order, I have contacted IR already about the removal.  They will be reviewing the filter and deciding if it can be removed.

## 2017-04-20 ENCOUNTER — HOSPITAL ENCOUNTER (OUTPATIENT)
Dept: LAB | Facility: CLINIC | Age: 52
Discharge: HOME | End: 2017-04-20
Attending: TRANSPLANT SURGERY | Admitting: TRANSPLANT SURGERY
Payer: COMMERCIAL

## 2017-04-20 DIAGNOSIS — Z94.4 LIVER REPLACED BY TRANSPLANT (H): ICD-10-CM

## 2017-04-20 LAB
ALBUMIN SERPL-MCNC: 2.9 G/DL (ref 3.4–5)
ALP SERPL-CCNC: 114 U/L (ref 40–150)
ALT SERPL W P-5'-P-CCNC: 20 U/L (ref 0–70)
ANION GAP SERPL CALCULATED.3IONS-SCNC: 8 MMOL/L (ref 3–14)
AST SERPL W P-5'-P-CCNC: 26 U/L (ref 0–45)
BILIRUB DIRECT SERPL-MCNC: 0.5 MG/DL (ref 0–0.2)
BILIRUB SERPL-MCNC: 0.8 MG/DL (ref 0.2–1.3)
BUN SERPL-MCNC: 32 MG/DL (ref 7–30)
CALCIUM SERPL-MCNC: 8.2 MG/DL (ref 8.5–10.1)
CHLORIDE SERPL-SCNC: 110 MMOL/L (ref 94–109)
CO2 SERPL-SCNC: 20 MMOL/L (ref 20–32)
CREAT SERPL-MCNC: 1.47 MG/DL (ref 0.66–1.25)
ERYTHROCYTE [DISTWIDTH] IN BLOOD BY AUTOMATED COUNT: 15.2 % (ref 10–15)
GFR SERPL CREATININE-BSD FRML MDRD: 50 ML/MIN/1.7M2
GLUCOSE SERPL-MCNC: 125 MG/DL (ref 70–99)
HCT VFR BLD AUTO: 26.9 % (ref 40–53)
HGB BLD-MCNC: 9.1 G/DL (ref 13.3–17.7)
MAGNESIUM SERPL-MCNC: 1.8 MG/DL (ref 1.6–2.3)
MCH RBC QN AUTO: 29.6 PG (ref 26.5–33)
MCHC RBC AUTO-ENTMCNC: 33.8 G/DL (ref 31.5–36.5)
MCV RBC AUTO: 88 FL (ref 78–100)
PHOSPHATE SERPL-MCNC: 4.9 MG/DL (ref 2.5–4.5)
PLATELET # BLD AUTO: 103 10E9/L (ref 150–450)
POTASSIUM SERPL-SCNC: 5.7 MMOL/L (ref 3.4–5.3)
PROT SERPL-MCNC: 5.6 G/DL (ref 6.8–8.8)
RBC # BLD AUTO: 3.07 10E12/L (ref 4.4–5.9)
SODIUM SERPL-SCNC: 138 MMOL/L (ref 133–144)
WBC # BLD AUTO: 5.9 10E9/L (ref 4–11)

## 2017-04-20 PROCEDURE — 80197 ASSAY OF TACROLIMUS: CPT | Performed by: TRANSPLANT SURGERY

## 2017-04-20 PROCEDURE — 84100 ASSAY OF PHOSPHORUS: CPT | Performed by: TRANSPLANT SURGERY

## 2017-04-20 PROCEDURE — 36415 COLL VENOUS BLD VENIPUNCTURE: CPT | Performed by: TRANSPLANT SURGERY

## 2017-04-20 PROCEDURE — 80048 BASIC METABOLIC PNL TOTAL CA: CPT | Performed by: TRANSPLANT SURGERY

## 2017-04-20 PROCEDURE — 83735 ASSAY OF MAGNESIUM: CPT | Performed by: TRANSPLANT SURGERY

## 2017-04-20 PROCEDURE — 85027 COMPLETE CBC AUTOMATED: CPT | Performed by: TRANSPLANT SURGERY

## 2017-04-20 PROCEDURE — 80076 HEPATIC FUNCTION PANEL: CPT | Performed by: TRANSPLANT SURGERY

## 2017-04-20 ASSESSMENT — ENCOUNTER SYMPTOMS
WEIGHT GAIN: 0
POLYPHAGIA: 0
WEIGHT LOSS: 0
INCREASED ENERGY: 0
FEVER: 0
CHILLS: 0
NIGHT SWEATS: 0
ALTERED TEMPERATURE REGULATION: 0
DECREASED APPETITE: 1
FATIGUE: 0
HALLUCINATIONS: 0
POLYDIPSIA: 0

## 2017-04-21 LAB
TACROLIMUS BLD-MCNC: 8.5 UG/L (ref 5–15)
TME LAST DOSE: NORMAL H

## 2017-04-24 ENCOUNTER — OFFICE VISIT (OUTPATIENT)
Dept: GASTROENTEROLOGY | Facility: CLINIC | Age: 52
End: 2017-04-24
Attending: INTERNAL MEDICINE
Payer: COMMERCIAL

## 2017-04-24 VITALS
DIASTOLIC BLOOD PRESSURE: 105 MMHG | BODY MASS INDEX: 31.68 KG/M2 | SYSTOLIC BLOOD PRESSURE: 193 MMHG | HEART RATE: 87 BPM | WEIGHT: 233.9 LBS | TEMPERATURE: 97.6 F | OXYGEN SATURATION: 98 % | HEIGHT: 72 IN

## 2017-04-24 DIAGNOSIS — Z94.4 LIVER REPLACED BY TRANSPLANT (H): Primary | ICD-10-CM

## 2017-04-24 PROCEDURE — 99212 OFFICE O/P EST SF 10 MIN: CPT | Mod: ZF

## 2017-04-24 ASSESSMENT — PAIN SCALES - GENERAL: PAINLEVEL: SEVERE PAIN (7)

## 2017-04-24 NOTE — MR AVS SNAPSHOT
After Visit Summary   4/24/2017    Camacho Bhagat    MRN: [id number]           Patient Information     Date Of Birth          1965        Visit Information        Provider Department      4/24/2017 1:00 PM Toñito Camejo MD [facility]        Today's Diagnoses     Liver replaced by transplant (H)    -  1       Follow-ups after your visit        Follow-up notes from your care team     Return in about 6 weeks (around 6/5/2017).      Your next 10 appointments already scheduled     May 04, 2017  2:20 PM CDT   New Patient Visit with Toñito Rivas MD   [facility] [place] [facility] ([facility])    [address]  [address]  [facility] [address]   [phone]            May 08, 2017 11:50 AM CDT   (Arrive by 11:35 AM)   Liver Return Post Op with Jovan Tran MD   [facility] ([facility])    [address] [place] [address]  3rd [facility] [id number] [phone]            May 11, 2017  3:00 PM CDT   (Arrive by 2:45 PM)   Return Visit with Shay Kirkpatrick MD   [facility] ([facility])    [address]  4th Floor  [facility] [id number] [phone]            Jun 07, 2017 12:45 PM CDT   (Arrive by 12:30 PM)   Return General Liver with Toñito Camejo MD   [facility] ([facility])    [address] [place] [address]  3rd [facility] [id number] [phone]              Who to contact     If you have questions or need follow up information about today's clinic visit or your schedule please contact [facility] directly at [phone].  Normal or non-critical lab and imaging results will be communicated to you by MyChart, letter or phone within 4 business days after the clinic has received the results. If you do not hear from us within 7 days, please contact the clinic through MyChart or phone. If you have a  critical or abnormal lab result, we will notify you by phone as soon as possible.  Submit refill requests through [employer] or call your pharmacy and they will forward the refill request to us. Please allow 3 business days for your refill to be completed.          Additional Information About Your Visit        Mediohart Information     [employer] gives you secure access to your electronic health record. If you see a primary care provider, you can also send messages to your care team and make appointments. If you have questions, please call your primary care clinic.  If you do not have a primary care provider, please call [phone] and they will assist you.        Care EveryWhere ID     This is your Care EveryWhere ID. This could be used by other organizations to access your [facility] medical records  [id number]        Your Vitals Were     Pulse Temperature Height Pulse Oximetry BMI (Body Mass Index)       87 97.6  F (36.4  C) (Oral) 1.829 m (6') 98% 31.72 kg/m2        Blood Pressure from Last 3 Encounters:   04/24/17 (!) 193/105   04/17/17 (!) 185/98   03/26/17 141/90    Weight from Last 3 Encounters:   04/24/17 106.1 kg (233 lb 14.4 oz)   04/18/17 111.6 kg (246 lb)   04/17/17 111.6 kg (246 lb)              Today, you had the following     No orders found for display         Today's Medication Changes          These changes are accurate as of: 4/24/17 11:59 PM.  If you have any questions, ask your nurse or doctor.               These medicines have changed or have updated prescriptions.        Dose/Directions    furosemide 20 MG tablet   Commonly known as:  LASIX   This may have changed:  when to take this   Used for:  Liver replaced by transplant (H)        Dose:  40 mg   Take 2 tablets (40 mg) by mouth daily   Quantity:  30 tablet   Refills:  3                Primary Care Provider Office Phone # Fax #    Shay Kirkpatrick [phone]        [facility] [address] [place] [address]  [facility] [address]         Thank you!     Thank you for choosing [facility]  for your care. Our goal is always to provide you with excellent care. Hearing back from our patients is one way we can continue to improve our services. Please take a few minutes to complete the written survey that you may receive in the mail after your visit with us. Thank you!             Your Updated Medication List - Protect others around you: Learn how to safely use, store and throw away your medicines at www.disposemymeds.org.          This list is accurate as of: 4/24/17 11:59 PM.  Always use your most recent med list.                   Brand Name Dispense Instructions for use    aspirin 325 MG tablet     180 tablet    1 tablet (325 mg) by Oral or Feeding Tube route daily       clotrimazole 10 MG Lozg lozenge    MYCELEX    70 each    Place 1 lozenge (1 Beatrice) inside cheek 3 times daily       cyclobenzaprine 10 MG tablet    FLEXERIL    90 tablet    Take 1 tablet (10 mg) by mouth 3 times daily as needed for muscle spasms       furosemide 20 MG tablet    LASIX    30 tablet    Take 2 tablets (40 mg) by mouth daily       gabapentin 300 MG capsule    NEURONTIN    90 capsule    Take 1 capsule (300 mg) by mouth 3 times daily       glucagon 1 MG Solr injection     1 each    Inject 1 mg Subcutaneous every 15 minutes as needed for low blood sugar (May repeat x 1 only)       * insulin aspart 100 UNIT/ML injection    NovoLOG PEN    3 mL    DOSE:  1 units per 8 grams of carbohydrate. With meals and snacks. Only chart total amount of units given.  Do not give if pre-prandial glucose is less than 60 mg/dL.       * insulin aspart 100 UNIT/ML injection    NovoLOG PEN    3 mL    Inject 1-10 Units Subcutaneous 3 times daily (before meals) For Pre-Meal  - 189 give 1 unit.  For Pre-Meal  - 239 give 2 units.  For Pre-Meal  - 289 give 3 units.  For Pre-Meal  - 339 give 4 units.  For Pre-Meal -399 give 5 units. For Pre-Meal -449 give 6  units. For Pre-Meal BG = or > 450 give 7 units.       * insulin aspart 100 UNIT/ML injection    NovoLOG PEN    3 mL    Inject 1-7 Units Subcutaneous At Bedtime Bedtime Blood Glucose (BG) For  - 249 give 1 units.  For  - 299 give 2 units.  For  - 349 give 3 units. For - 399 give 4 units.  For BG = or > 400 give 5 units.       insulin glargine 100 UNIT/ML injection    LANTUS    3 mL    Inject 45 Units Subcutaneous every 24 hours       LACTOBACILLUS EXTRA STRENGTH Caps     30 capsule    Take 1 capsule by mouth 2 times daily       lidocaine 5 % Patch    LIDODERM    30 patch    Place 1 patch onto the skin every 24 hours       magnesium oxide 400 MG tablet    MAG-OX    7 tablet    Take 1 tablet (400 mg) by mouth 2 times daily       megestrol 40 MG/ML suspension    MEGACE    600 mL    Take 10 mLs (400 mg) by mouth 2 times daily       multivitamin, therapeutic with minerals Tabs tablet     30 each    Take 1 tablet by mouth daily       mycophenolate 250 MG capsule    CELLCEPT - GENERIC EQUIVALENT    240 capsule    Take 4 capsules (1,000 mg) by mouth 2 times daily       omeprazole 20 MG CR capsule    priLOSEC    60 capsule    Take 1 capsule (20 mg) by mouth 2 times daily (before meals)       ondansetron 4 MG ODT tab    ZOFRAN-ODT    30 tablet    Take 1 tablet (4 mg) by mouth every 8 hours as needed for nausea       oxyCODONE 5 MG IR tablet    ROXICODONE    40 tablet    Take 1-2 tablets (5-10 mg) by mouth every 4 hours as needed for moderate to severe pain       prochlorperazine 5 MG tablet    COMPAZINE    90 tablet    Take 1-2 tablets (5-10 mg) by mouth every 6 hours as needed for nausea or vomiting       senna-docusate 8.6-50 MG per tablet    SENOKOT-S;PERICOLACE    100 tablet    Take 2 tablets by mouth 2 times daily       sulfamethoxazole-trimethoprim 400-80 MG per tablet    BACTRIM/SEPTRA    30 tablet    Take 1 tablet by mouth daily       tacrolimus 1 MG capsule    PROGRAF - GENERIC EQUIVALENT     360 capsule    Take 6 capsules (6 mg) by mouth every 12 hours       valGANciclovir 450 MG tablet    VALCYTE    60 tablet    Take 1 tablet (450 mg) by mouth daily       * Notice:  This list has 3 medication(s) that are the same as other medications prescribed for you. Read the directions carefully, and ask your doctor or other care provider to review them with you.

## 2017-04-24 NOTE — PROGRESS NOTES
HISTORY OF PRESENT ILLNESS:  I had the pleasure of seeing Camacho Bhagat for followup in the Liver Transplantation Clinic at the [facility] on 04/24/2017.  Mr. 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  Unfortunately, he became very ill just prior to transplantation and developed acute kidney injury and was transplanted out of the hospital.        Nonetheless, he has made a remarkable recovery.  He is doing extremely well at this visit.  He denies any abdominal pain, itching or skin rash or fatigue.  He denies any increased abdominal girth or lower extremity edema.  He denies any fevers or chills, cough or shortness of breath.  He denies any nausea or vomiting and is having 1-2 bowel movements per day.  His appetite, he reports, is good but his weight has continued to come down.  He attributes this to the fact that he is eating much healthier and has been fairly active.  There have been  no significant post-transplant complications.       Current Outpatient Prescriptions   Medication     clotrimazole (MYCELEX) 10 MG LOZG lozenge     tacrolimus (PROGRAF - GENERIC EQUIVALENT) 1 MG capsule     mycophenolate (CELLCEPT - GENERIC EQUIVALENT) 250 MG capsule     furosemide (LASIX) 20 MG tablet     magnesium oxide (MAG-OX) 400 MG tablet     megestrol (MEGACE) 40 MG/ML suspension     cyclobenzaprine (FLEXERIL) 10 MG tablet     LACTOBACILLUS EXTRA STRENGTH CAPS     prochlorperazine (COMPAZINE) 5 MG tablet     gabapentin (NEURONTIN) 300 MG capsule     oxyCODONE (ROXICODONE) 5 MG IR tablet     aspirin 325 MG tablet     insulin aspart (NOVOLOG PEN) 100 UNIT/ML injection     insulin glargine (LANTUS) 100 UNIT/ML injection     valGANciclovir (VALCYTE) 450 MG tablet     senna-docusate (SENOKOT-S;PERICOLACE) 8.6-50 MG per tablet     sulfamethoxazole-trimethoprim (BACTRIM/SEPTRA)  400-80 MG per tablet     multivitamin, therapeutic with minerals (THERA-VIT-M) TABS tablet     insulin aspart (NOVOLOG PEN) 100 UNIT/ML injection     insulin aspart (NOVOLOG PEN) 100 UNIT/ML injection     ondansetron (ZOFRAN-ODT) 4 MG ODT tab     omeprazole (PRILOSEC) 20 MG CR capsule     glucagon 1 MG SOLR injection     lidocaine (LIDODERM) 5 % Patch     No current facility-administered medications for this visit.      B/P: 193/105, T: 97.6, P: 87, R: Data Unavailable    HEENT exam shows no scleral icterus and no temporal muscle wasting.  Chest is clear.  Abdominal exam shows no increase in girth.  No masses or tenderness to palpation are present.  His incision is intact.  His liver is 10 cm in span without left lobe enlargement.  No spleen tip is palpable.  Extremity exam shows no edema.  Skin exam shows no stigmata of chronic liver disease or chronic pruritus.  Neurologic exam is nonfocal.       Recent Results (from the past 168 hour(s))   CBC with platelets    Collection Time: 04/20/17  3:24 PM   Result Value Ref Range    WBC 5.9 4.0 - 11.0 10e9/L    RBC Count 3.07 (L) 4.4 - 5.9 10e12/L    Hemoglobin 9.1 (L) 13.3 - 17.7 g/dL    Hematocrit 26.9 (L) 40.0 - 53.0 %    MCV 88 78 - 100 fl    MCH 29.6 26.5 - 33.0 pg    MCHC 33.8 31.5 - 36.5 g/dL    RDW 15.2 (H) 10.0 - 15.0 %    Platelet Count 103 (L) 150 - 450 10e9/L   Basic metabolic panel    Collection Time: 04/20/17  3:24 PM   Result Value Ref Range    Sodium 138 133 - 144 mmol/L    Potassium 5.7 (H) 3.4 - 5.3 mmol/L    Chloride 110 (H) 94 - 109 mmol/L    Carbon Dioxide 20 20 - 32 mmol/L    Anion Gap 8 3 - 14 mmol/L    Glucose 125 (H) 70 - 99 mg/dL    Urea Nitrogen 32 (H) 7 - 30 mg/dL    Creatinine 1.47 (H) 0.66 - 1.25 mg/dL    GFR Estimate 50 (L) >60 mL/min/1.7m2    GFR Estimate If Black 61 >60 mL/min/1.7m2    Calcium 8.2 (L) 8.5 - 10.1 mg/dL   Phosphorus    Collection Time: 04/20/17  3:24 PM   Result Value Ref Range    Phosphorus 4.9 (H) 2.5 - 4.5 mg/dL    Magnesium    Collection Time: 04/20/17  3:24 PM   Result Value Ref Range    Magnesium 1.8 1.6 - 2.3 mg/dL   Hepatic panel    Collection Time: 04/20/17  3:24 PM   Result Value Ref Range    Bilirubin Direct 0.5 (H) 0.0 - 0.2 mg/dL    Bilirubin Total 0.8 0.2 - 1.3 mg/dL    Albumin 2.9 (L) 3.4 - 5.0 g/dL    Protein Total 5.6 (L) 6.8 - 8.8 g/dL    Alkaline Phosphatase 114 40 - 150 U/L    ALT 20 0 - 70 U/L    AST 26 0 - 45 U/L   Tacrolimus level    Collection Time: 04/20/17  3:24 PM   Result Value Ref Range    Tacrolimus Last Dose 2200 04/19/17     Tacrolimus Level 8.5 5.0 - 15.0 ug/L      My impression is that Mr. Bhagat is doing very well status post liver transplantation for hepatocellular carcinoma.  I did review with the explant histopathology and he had 2 lesions that were present.  One was a 4 cm lesion and the other was a less than 1 cm lesion and there certainly has some evidence of necrosis.  I could not discern from the path report what percentage of necrosis was present.  There was no evidence of lymphovascular invasion.  The tumor was well differentiated.      He is fairly early post-transplant and I will not be taking away any medications at this point in time, we will begin withdrawing some of the medications at 3 months post-transplant.  I have encouraged him to continue with the weight loss and I will see him back in the clinic again in 6 weeks.      Thank you very much for allowing me to participate in the care of this patient.  If you have any questions regarding my recommendations, please do not hesitate to contact me.       Toñito Camejo MD      Professor of Medicine  [employer]      Executive Medical Director, Solid Organ Transplant Program  [facility]

## 2017-04-24 NOTE — NURSING NOTE
Chief Complaint   Patient presents with     RECHECK     Post Liver TXP     Pt roomed, vitals, meds, and allergies reviewed with pt. Pt ready for provider.  Aric Villanueva, CMA

## 2017-04-24 NOTE — LETTER
4/24/2017       RE: Camacho Bhagat  [address] [facility] [address]     Dear Colleague,    Thank you for referring your patient, Camacho Bhagat, to the [facility]PAT[facility] at [facility]. Please see a copy of my visit note below.    HISTORY OF PRESENT ILLNESS:  I had the pleasure of seeing Camacho Bhagat for followup in the Liver Transplantation Clinic at the [facility] on 04/24/2017.  Mr. 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  Unfortunately, he became very ill just prior to transplantation and developed acute kidney injury and was transplanted out of the hospital.        Nonetheless, he has made a remarkable recovery.  He is doing extremely well at this visit.  He denies any abdominal pain, itching or skin rash or fatigue.  He denies any increased abdominal girth or lower extremity edema.  He denies any fevers or chills, cough or shortness of breath.  He denies any nausea or vomiting and is having 1-2 bowel movements per day.  His appetite, he reports, is good but his weight has continued to come down.  He attributes this to the fact that he is eating much healthier and has been fairly active.  There have been  no significant post-transplant complications.       Current Outpatient Prescriptions   Medication     clotrimazole (MYCELEX) 10 MG LOZG lozenge     tacrolimus (PROGRAF - GENERIC EQUIVALENT) 1 MG capsule     mycophenolate (CELLCEPT - GENERIC EQUIVALENT) 250 MG capsule     furosemide (LASIX) 20 MG tablet     magnesium oxide (MAG-OX) 400 MG tablet     megestrol (MEGACE) 40 MG/ML suspension     cyclobenzaprine (FLEXERIL) 10 MG tablet     LACTOBACILLUS EXTRA STRENGTH CAPS     prochlorperazine (COMPAZINE) 5 MG tablet     gabapentin (NEURONTIN) 300 MG capsule     oxyCODONE (ROXICODONE) 5 MG IR tablet      aspirin 325 MG tablet     insulin aspart (NOVOLOG PEN) 100 UNIT/ML injection     insulin glargine (LANTUS) 100 UNIT/ML injection     valGANciclovir (VALCYTE) 450 MG tablet     senna-docusate (SENOKOT-S;PERICOLACE) 8.6-50 MG per tablet     sulfamethoxazole-trimethoprim (BACTRIM/SEPTRA) 400-80 MG per tablet     multivitamin, therapeutic with minerals (THERA-VIT-M) TABS tablet     insulin aspart (NOVOLOG PEN) 100 UNIT/ML injection     insulin aspart (NOVOLOG PEN) 100 UNIT/ML injection     ondansetron (ZOFRAN-ODT) 4 MG ODT tab     omeprazole (PRILOSEC) 20 MG CR capsule     glucagon 1 MG SOLR injection     lidocaine (LIDODERM) 5 % Patch     No current facility-administered medications for this visit.      B/P: 193/105, T: 97.6, P: 87, R: Data Unavailable    HEENT exam shows no scleral icterus and no temporal muscle wasting.  Chest is clear.  Abdominal exam shows no increase in girth.  No masses or tenderness to palpation are present.  His incision is intact.  His liver is 10 cm in span without left lobe enlargement.  No spleen tip is palpable.  Extremity exam shows no edema.  Skin exam shows no stigmata of chronic liver disease or chronic pruritus.  Neurologic exam is nonfocal.       Recent Results (from the past 168 hour(s))   CBC with platelets    Collection Time: 04/20/17  3:24 PM   Result Value Ref Range    WBC 5.9 4.0 - 11.0 10e9/L    RBC Count 3.07 (L) 4.4 - 5.9 10e12/L    Hemoglobin 9.1 (L) 13.3 - 17.7 g/dL    Hematocrit 26.9 (L) 40.0 - 53.0 %    MCV 88 78 - 100 fl    MCH 29.6 26.5 - 33.0 pg    MCHC 33.8 31.5 - 36.5 g/dL    RDW 15.2 (H) 10.0 - 15.0 %    Platelet Count 103 (L) 150 - 450 10e9/L   Basic metabolic panel    Collection Time: 04/20/17  3:24 PM   Result Value Ref Range    Sodium 138 133 - 144 mmol/L    Potassium 5.7 (H) 3.4 - 5.3 mmol/L    Chloride 110 (H) 94 - 109 mmol/L    Carbon Dioxide 20 20 - 32 mmol/L    Anion Gap 8 3 - 14 mmol/L    Glucose 125 (H) 70 - 99 mg/dL    Urea Nitrogen 32 (H) 7 - 30 mg/dL     Creatinine 1.47 (H) 0.66 - 1.25 mg/dL    GFR Estimate 50 (L) >60 mL/min/1.7m2    GFR Estimate If Black 61 >60 mL/min/1.7m2    Calcium 8.2 (L) 8.5 - 10.1 mg/dL   Phosphorus    Collection Time: 04/20/17  3:24 PM   Result Value Ref Range    Phosphorus 4.9 (H) 2.5 - 4.5 mg/dL   Magnesium    Collection Time: 04/20/17  3:24 PM   Result Value Ref Range    Magnesium 1.8 1.6 - 2.3 mg/dL   Hepatic panel    Collection Time: 04/20/17  3:24 PM   Result Value Ref Range    Bilirubin Direct 0.5 (H) 0.0 - 0.2 mg/dL    Bilirubin Total 0.8 0.2 - 1.3 mg/dL    Albumin 2.9 (L) 3.4 - 5.0 g/dL    Protein Total 5.6 (L) 6.8 - 8.8 g/dL    Alkaline Phosphatase 114 40 - 150 U/L    ALT 20 0 - 70 U/L    AST 26 0 - 45 U/L   Tacrolimus level    Collection Time: 04/20/17  3:24 PM   Result Value Ref Range    Tacrolimus Last Dose 2200 04/19/17     Tacrolimus Level 8.5 5.0 - 15.0 ug/L      My impression is that Mr. Bhagat is doing very well status post liver transplantation for hepatocellular carcinoma.  I did review with the explant histopathology and he had 2 lesions that were present.  One was a 4 cm lesion and the other was a less than 1 cm lesion and there certainly has some evidence of necrosis.  I could not discern from the path report what percentage of necrosis was present.  There was no evidence of lymphovascular invasion.  The tumor was well differentiated.      He is fairly early post-transplant and I will not be taking away any medications at this point in time, we will begin withdrawing some of the medications at 3 months post-transplant.  I have encouraged him to continue with the weight loss and I will see him back in the clinic again in 6 weeks.      Thank you very much for allowing me to participate in the care of this patient.  If you have any questions regarding my recommendations, please do not hesitate to contact me.       Toñito Camejo MD      Professor of Medicine  [employer]      Executive Medical  Director, Solid Organ Transplant Program  [facility]

## 2017-04-24 NOTE — LETTER
4/24/2017      RE: Camacho Bhagat  [address] [facility] [address]       HISTORY OF PRESENT ILLNESS:  I had the pleasure of seeing Camacho Bhagat for followup in the Liver Transplantation Clinic at the [facility] on 04/24/2017.  Mr. 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  Unfortunately, he became very ill just prior to transplantation and developed acute kidney injury and was transplanted out of the hospital.        Nonetheless, he has made a remarkable recovery.  He is doing extremely well at this visit.  He denies any abdominal pain, itching or skin rash or fatigue.  He denies any increased abdominal girth or lower extremity edema.  He denies any fevers or chills, cough or shortness of breath.  He denies any nausea or vomiting and is having 1-2 bowel movements per day.  His appetite, he reports, is good but his weight has continued to come down.  He attributes this to the fact that he is eating much healthier and has been fairly active.  There have been  no significant post-transplant complications.       Current Outpatient Prescriptions   Medication     clotrimazole (MYCELEX) 10 MG LOZG lozenge     tacrolimus (PROGRAF - GENERIC EQUIVALENT) 1 MG capsule     mycophenolate (CELLCEPT - GENERIC EQUIVALENT) 250 MG capsule     furosemide (LASIX) 20 MG tablet     magnesium oxide (MAG-OX) 400 MG tablet     megestrol (MEGACE) 40 MG/ML suspension     cyclobenzaprine (FLEXERIL) 10 MG tablet     LACTOBACILLUS EXTRA STRENGTH CAPS     prochlorperazine (COMPAZINE) 5 MG tablet     gabapentin (NEURONTIN) 300 MG capsule     oxyCODONE (ROXICODONE) 5 MG IR tablet     aspirin 325 MG tablet     insulin aspart (NOVOLOG PEN) 100 UNIT/ML injection     insulin glargine (LANTUS) 100 UNIT/ML injection     valGANciclovir (VALCYTE) 450 MG tablet     senna-docusate  (SENOKOT-S;PERICOLACE) 8.6-50 MG per tablet     sulfamethoxazole-trimethoprim (BACTRIM/SEPTRA) 400-80 MG per tablet     multivitamin, therapeutic with minerals (THERA-VIT-M) TABS tablet     insulin aspart (NOVOLOG PEN) 100 UNIT/ML injection     insulin aspart (NOVOLOG PEN) 100 UNIT/ML injection     ondansetron (ZOFRAN-ODT) 4 MG ODT tab     omeprazole (PRILOSEC) 20 MG CR capsule     glucagon 1 MG SOLR injection     lidocaine (LIDODERM) 5 % Patch     No current facility-administered medications for this visit.      B/P: 193/105, T: 97.6, P: 87, R: Data Unavailable    HEENT exam shows no scleral icterus and no temporal muscle wasting.  Chest is clear.  Abdominal exam shows no increase in girth.  No masses or tenderness to palpation are present.  His incision is intact.  His liver is 10 cm in span without left lobe enlargement.  No spleen tip is palpable.  Extremity exam shows no edema.  Skin exam shows no stigmata of chronic liver disease or chronic pruritus.  Neurologic exam is nonfocal.       Recent Results (from the past 168 hour(s))   CBC with platelets    Collection Time: 04/20/17  3:24 PM   Result Value Ref Range    WBC 5.9 4.0 - 11.0 10e9/L    RBC Count 3.07 (L) 4.4 - 5.9 10e12/L    Hemoglobin 9.1 (L) 13.3 - 17.7 g/dL    Hematocrit 26.9 (L) 40.0 - 53.0 %    MCV 88 78 - 100 fl    MCH 29.6 26.5 - 33.0 pg    MCHC 33.8 31.5 - 36.5 g/dL    RDW 15.2 (H) 10.0 - 15.0 %    Platelet Count 103 (L) 150 - 450 10e9/L   Basic metabolic panel    Collection Time: 04/20/17  3:24 PM   Result Value Ref Range    Sodium 138 133 - 144 mmol/L    Potassium 5.7 (H) 3.4 - 5.3 mmol/L    Chloride 110 (H) 94 - 109 mmol/L    Carbon Dioxide 20 20 - 32 mmol/L    Anion Gap 8 3 - 14 mmol/L    Glucose 125 (H) 70 - 99 mg/dL    Urea Nitrogen 32 (H) 7 - 30 mg/dL    Creatinine 1.47 (H) 0.66 - 1.25 mg/dL    GFR Estimate 50 (L) >60 mL/min/1.7m2    GFR Estimate If Black 61 >60 mL/min/1.7m2    Calcium 8.2 (L) 8.5 - 10.1 mg/dL   Phosphorus    Collection  Time: 04/20/17  3:24 PM   Result Value Ref Range    Phosphorus 4.9 (H) 2.5 - 4.5 mg/dL   Magnesium    Collection Time: 04/20/17  3:24 PM   Result Value Ref Range    Magnesium 1.8 1.6 - 2.3 mg/dL   Hepatic panel    Collection Time: 04/20/17  3:24 PM   Result Value Ref Range    Bilirubin Direct 0.5 (H) 0.0 - 0.2 mg/dL    Bilirubin Total 0.8 0.2 - 1.3 mg/dL    Albumin 2.9 (L) 3.4 - 5.0 g/dL    Protein Total 5.6 (L) 6.8 - 8.8 g/dL    Alkaline Phosphatase 114 40 - 150 U/L    ALT 20 0 - 70 U/L    AST 26 0 - 45 U/L   Tacrolimus level    Collection Time: 04/20/17  3:24 PM   Result Value Ref Range    Tacrolimus Last Dose 2200 04/19/17     Tacrolimus Level 8.5 5.0 - 15.0 ug/L      My impression is that Mr. Bhagat is doing very well status post liver transplantation for hepatocellular carcinoma.  I did review with the explant histopathology and he had 2 lesions that were present.  One was a 4 cm lesion and the other was a less than 1 cm lesion and there certainly has some evidence of necrosis.  I could not discern from the path report what percentage of necrosis was present.  There was no evidence of lymphovascular invasion.  The tumor was well differentiated.      He is fairly early post-transplant and I will not be taking away any medications at this point in time, we will begin withdrawing some of the medications at 3 months post-transplant.  I have encouraged him to continue with the weight loss and I will see him back in the clinic again in 6 weeks.      Thank you very much for allowing me to participate in the care of this patient.  If you have any questions regarding my recommendations, please do not hesitate to contact me.       Toñito Camejo MD      Professor of Medicine  [employer]      Executive Medical Director, Solid Organ Transplant Program  [facility]

## 2017-04-26 DIAGNOSIS — Z79.60 LONG-TERM USE OF IMMUNOSUPPRESSANT MEDICATION: ICD-10-CM

## 2017-04-26 DIAGNOSIS — K21.9 GASTROESOPHAGEAL REFLUX DISEASE WITHOUT ESOPHAGITIS: ICD-10-CM

## 2017-04-26 DIAGNOSIS — Z94.4 HISTORY OF LIVER TRANSPLANT (H): Primary | ICD-10-CM

## 2017-04-26 NOTE — PROGRESS NOTES
Camacho very frustrated with pharmacy, is out of omeprazole, having difficulty with using mail order.   He would normally use [facility] in [place], and is thinking of having his prescriptions sent out there.     Reminded Camacho to have the pharmacy use the fax number of the transplant center, not the clinic fax for refills.  He verbalized understanding.

## 2017-04-28 ENCOUNTER — TELEPHONE (OUTPATIENT)
Dept: TRANSPLANT | Facility: CLINIC | Age: 52
End: 2017-04-28

## 2017-04-28 DIAGNOSIS — D84.9 IMMUNOSUPPRESSION (H): ICD-10-CM

## 2017-04-28 RX ORDER — CYCLOBENZAPRINE HCL 10 MG
10 TABLET ORAL 3 TIMES DAILY PRN
Qty: 90 TABLET | Refills: 0 | Status: ON HOLD | OUTPATIENT
Start: 2017-04-28 | End: 2017-09-08

## 2017-05-02 DIAGNOSIS — Z94.4 LIVER TRANSPLANT RECIPIENT (H): ICD-10-CM

## 2017-05-02 DIAGNOSIS — Z53.9 ERRONEOUS ENCOUNTER--DISREGARD: Primary | ICD-10-CM

## 2017-05-02 NOTE — Clinical Note
Angelica, this pt has not heard from anyone about being scheduled for the IVC filter removal, can you find out what is going on with the plan. tks

## 2017-05-03 ENCOUNTER — THERAPY VISIT (OUTPATIENT)
Dept: PHYSICAL THERAPY | Facility: CLINIC | Age: 52
End: 2017-05-03
Payer: COMMERCIAL

## 2017-05-03 ENCOUNTER — HOSPITAL ENCOUNTER (OUTPATIENT)
Dept: LAB | Facility: CLINIC | Age: 52
Discharge: HOME | End: 2017-05-03
Attending: TRANSPLANT SURGERY | Admitting: TRANSPLANT SURGERY
Payer: COMMERCIAL

## 2017-05-03 DIAGNOSIS — Z94.4 LIVER REPLACED BY TRANSPLANT (H): ICD-10-CM

## 2017-05-03 DIAGNOSIS — M54.2 NECK PAIN: Primary | ICD-10-CM

## 2017-05-03 DIAGNOSIS — M54.9 BACK PAIN: ICD-10-CM

## 2017-05-03 LAB
ALBUMIN SERPL-MCNC: 3.6 G/DL (ref 3.4–5)
ALP SERPL-CCNC: 80 U/L (ref 40–150)
ALT SERPL W P-5'-P-CCNC: 14 U/L (ref 0–70)
ANION GAP SERPL CALCULATED.3IONS-SCNC: 6 MMOL/L (ref 3–14)
AST SERPL W P-5'-P-CCNC: 18 U/L (ref 0–45)
BILIRUB DIRECT SERPL-MCNC: 0.4 MG/DL (ref 0–0.2)
BILIRUB SERPL-MCNC: 0.6 MG/DL (ref 0.2–1.3)
BUN SERPL-MCNC: 34 MG/DL (ref 7–30)
CALCIUM SERPL-MCNC: 8.9 MG/DL (ref 8.5–10.1)
CHLORIDE SERPL-SCNC: 112 MMOL/L (ref 94–109)
CO2 SERPL-SCNC: 23 MMOL/L (ref 20–32)
CREAT SERPL-MCNC: 1.78 MG/DL (ref 0.66–1.25)
ERYTHROCYTE [DISTWIDTH] IN BLOOD BY AUTOMATED COUNT: 16.6 % (ref 10–15)
GFR SERPL CREATININE-BSD FRML MDRD: 40 ML/MIN/1.7M2
GLUCOSE SERPL-MCNC: 107 MG/DL (ref 70–99)
HCT VFR BLD AUTO: 25 % (ref 40–53)
HGB BLD-MCNC: 8.2 G/DL (ref 13.3–17.7)
MAGNESIUM SERPL-MCNC: 2 MG/DL (ref 1.6–2.3)
MCH RBC QN AUTO: 29.7 PG (ref 26.5–33)
MCHC RBC AUTO-ENTMCNC: 32.8 G/DL (ref 31.5–36.5)
MCV RBC AUTO: 91 FL (ref 78–100)
PHOSPHATE SERPL-MCNC: 3.9 MG/DL (ref 2.5–4.5)
PLATELET # BLD AUTO: 130 10E9/L (ref 150–450)
POTASSIUM SERPL-SCNC: 5.7 MMOL/L (ref 3.4–5.3)
PROT SERPL-MCNC: 6.2 G/DL (ref 6.8–8.8)
RBC # BLD AUTO: 2.76 10E12/L (ref 4.4–5.9)
SODIUM SERPL-SCNC: 141 MMOL/L (ref 133–144)
TACROLIMUS BLD-MCNC: 8.4 UG/L (ref 5–15)
TME LAST DOSE: NORMAL H
WBC # BLD AUTO: 4.3 10E9/L (ref 4–11)

## 2017-05-03 PROCEDURE — 80197 ASSAY OF TACROLIMUS: CPT | Performed by: TRANSPLANT SURGERY

## 2017-05-03 PROCEDURE — 80048 BASIC METABOLIC PNL TOTAL CA: CPT | Performed by: TRANSPLANT SURGERY

## 2017-05-03 PROCEDURE — 36415 COLL VENOUS BLD VENIPUNCTURE: CPT | Performed by: TRANSPLANT SURGERY

## 2017-05-03 PROCEDURE — 85027 COMPLETE CBC AUTOMATED: CPT | Performed by: TRANSPLANT SURGERY

## 2017-05-03 PROCEDURE — 80076 HEPATIC FUNCTION PANEL: CPT | Performed by: TRANSPLANT SURGERY

## 2017-05-03 PROCEDURE — 83735 ASSAY OF MAGNESIUM: CPT | Performed by: TRANSPLANT SURGERY

## 2017-05-03 PROCEDURE — 84100 ASSAY OF PHOSPHORUS: CPT | Performed by: TRANSPLANT SURGERY

## 2017-05-03 PROCEDURE — 97012 MECHANICAL TRACTION THERAPY: CPT | Mod: GP | Performed by: PHYSICAL THERAPIST

## 2017-05-03 PROCEDURE — 97110 THERAPEUTIC EXERCISES: CPT | Mod: GP | Performed by: PHYSICAL THERAPIST

## 2017-05-03 PROCEDURE — 97161 PT EVAL LOW COMPLEX 20 MIN: CPT | Mod: GP | Performed by: PHYSICAL THERAPIST

## 2017-05-03 NOTE — PROGRESS NOTES
"[facility] Initial Evaluation      Subjective:    Patient is a 51 year old male presenting with rehab cervical spine hpi. The history is provided by the patient. No  was used.   Camacho Bhagat is a 51 year old male with a cervical spine (full back, low back) condition.      This is a recurrent condition  Patient report the onset of neck and back pain about 4 days after a liver transplant on 3/4/2017.  Patient reports impaired walking, sitting, reading, and computer work.  Patient is currently off work due liver transplant.  Patient requests ultrasound and traction since these have helped with previous therapy many years ago.  He reports he is not to do any core strengthening including using lumbar muscles to sit upright.  He feels he knows the appropriate exercises for his neck and back pain.  Previous physical therapy note reports patient \"does not believe in physical therapy\".    Patient reports pain:  Cervical left side and cervical right side (right and left low back).  Radiates to:  Head (low back radiates to mid back).  Pain is described as sharp and aching and is constant and reported as 9/10 (patient is pleasant and appears to be in no apparent distress).  Associated with: patient denies radicular symptoms. Pain is the same all the time.  Symptoms are exacerbated by looking up or down, lifting and sitting (walking) and relieved by rest and analgesics.  Since onset symptoms are unchanged.  Special tests:  MRI (Arthritis).  Previous treatment: none.    General health as reported by patient is excellent.  Pertinent medical history includes:  Rheumatoid arthritis, osteoarthritis, cancer, diabetes and fibromyalgia.    Other surgeries include:  Cancer surgery and orthopedic surgery.  Current medications:  Pain medication, muscle relaxants and other (anti-rejection medication).  Current occupation is Unemployed d/t recent liver transplant.        Barriers include:  Lives " alone.    Red flags:  None as reported by the patient.                        Objective:    Standing Alignment:    Cervical/Thoracic:  Forward head and thoracic kyphosis increased    Lumbar:  Normal                           Lumbar/SI Evaluation  ROM:    AROM Lumbar:   Flexion:        50% (+) LBP and HS stretch  Ext:                    33% (+) LBP   Side Bend:        Left:     Right:   Rotation:           Left:     Right:   Side Glide:        Left:     Right:         Strength: Poor core strength  Lumbar Myotomes:    T12-L3 (Hip Flex):  Left: 5    Right: 5  L2-4 (Quads):  Left:  5    Right:  5  L4 (Ankle DF):  Left:  5    Right:  5  L5 (Great Toe Ext): Left: 4    Right: 4+   S1 (Toe Raise):  Left: 5    Right: 5                       Cervical/Thoracic Evaluation    AROM:  AROM Cervical:    Flexion:            90% PDM  Extension:       90% - Mod Loss of retraction   Rotation:         Left: WNL     Right: WNL  Side Bend:      Left: 90%     Right:  90%        Cervical Myotomes:  normal                                                                          General     ROS    Assessment/Plan:      Patient is a 51 year old male with cervical and lumbar complaints.    Patient has the following significant findings with corresponding treatment plan.                Diagnosis 1:  Neck and Low Back Pain  Pain -  self management, education and home program  Decreased ROM/flexibility - manual therapy, therapeutic exercise, therapeutic activity and home program  Decreased joint mobility - manual therapy, therapeutic exercise, therapeutic activity and home program  Decreased strength - therapeutic exercise, therapeutic activities and home program  Impaired muscle performance - neuro re-education and home program  Decreased function - therapeutic activities and home program  Impaired posture - neuro re-education, therapeutic activities and home program    Therapy Evaluation Codes:   1) History comprised of:   Personal factors that  impact the plan of care:      None.    Comorbidity factors that impact the plan of care are:      Cancer, Diabetes, Fibromyalgia, Osteoarthritis, Rheumatoid arthritis and Liver Transplant.     Medications impacting care: Muscle relaxant, Pain and Anti-rejection medication.  2) Examination of Body Systems comprised of:   Body structures and functions that impact the plan of care:      Cervical spine and Lumbar spine.   Activity limitations that impact the plan of care are:      Lifting, Reading/Computer work, Sitting, Standing and Walking.  3) Clinical presentation characteristics are:   Stable/Uncomplicated.  4) Decision-Making    Low complexity using standardized patient assessment instrument and/or measureable assessment of functional outcome.  Cumulative Therapy Evaluation is: Low complexity.    Previous and current functional limitations:  (See Goal Flow Sheet for this information)    Short term and Long term goals: (See Goal Flow Sheet for this information)     Communication ability:  Patient appears to be able to clearly communicate and understand verbal and written communication and follow directions correctly.  Treatment Explanation - The following has been discussed with the patient:   RX ordered/plan of care  Anticipated outcomes  Possible risks and side effects  This patient would benefit from PT intervention to resume normal activities.   Rehab potential is good.    Frequency:  1 X week, once daily  Duration:  for 8 weeks  Discharge Plan:  Achieve all LTG.  Independent in home treatment program.  Reach maximal therapeutic benefit.    Please refer to the daily flowsheet for treatment today, total treatment time and time spent performing 1:1 timed codes.

## 2017-05-03 NOTE — MR AVS SNAPSHOT
After Visit Summary   5/3/2017    Camacho Bhagat    MRN: [id number]           Patient Information     Date Of Birth          1965        Visit Information        Provider Department      5/3/2017 1:50 PM Fernando Reyes, PT [facility] Physical Therapy        Today's Diagnoses     Neck pain    -  1    Back pain           Follow-ups after your visit        Your next 10 appointments already scheduled     May 04, 2017  2:20 PM CDT   New Patient Visit with Toñito Rivas MD   [facility] ([facility])    [address]  [address]  [facility] [address]   [phone]            May 08, 2017 11:50 AM CDT   (Arrive by 11:35 AM)   Liver Return Post Op with Jovan Tran MD   [facility] ([facility])    [address] [place] [address]  3rd [facility] [id number] [phone]            May 11, 2017  3:00 PM CDT   (Arrive by 2:45 PM)   Return Visit with Shay Kirkpatrick MD   [facility] ([facility])    [address] [place] [address]  4th [facility] [id number] [phone]            Jun 07, 2017 12:45 PM CDT   (Arrive by 12:30 PM)   Return General Liver with Toñito Camejo MD   [facility] ([facility])    [address] [place] [id number] [phone]              Who to contact     If you have questions or need follow up information about today's clinic visit or your schedule please contact [facility] directly at [phone].  Normal or non-critical lab and imaging results will be communicated to you by MyChart, letter or phone within 4 business days after the clinic has received the results. If you do not hear from us within 7 days, please contact the clinic through MyChart or phone. If you have a  critical or abnormal lab result, we will notify you by phone as soon as possible.  Submit refill requests through [employer] or call your pharmacy and they will forward the refill request to us. Please allow 3 business days for your refill to be completed.          Additional Information About Your Visit        [employer] Information     [employer] gives you secure access to your electronic health record. If you see a primary care provider, you can also send messages to your care team and make appointments. If you have questions, please call your primary care clinic.  If you do not have a primary care provider, please call [phone] and they will assist you.        Care EveryWhere ID     This is your Care EveryWhere ID. This could be used by other organizations to access your [facility] medical records  [id number]         Blood Pressure from Last 3 Encounters:   04/24/17 (!) 193/105   04/17/17 (!) 185/98   03/26/17 141/90    Weight from Last 3 Encounters:   04/24/17 106.1 kg (233 lb 14.4 oz)   04/18/17 111.6 kg (246 lb)   04/17/17 111.6 kg (246 lb)              We Performed the Following     HC PT EVAL, LOW COMPLEXITY     NITA INITIAL EVAL REPORT     MECHANICAL TRACTION THERAPY     THERAPEUTIC EXERCISES          Today's Medication Changes          These changes are accurate as of: 5/3/17  3:59 PM.  If you have any questions, ask your nurse or doctor.               These medicines have changed or have updated prescriptions.        Dose/Directions    furosemide 20 MG tablet   Commonly known as:  LASIX   This may have changed:  when to take this   Used for:  Liver replaced by transplant (H)        Dose:  40 mg   Take 2 tablets (40 mg) by mouth daily   Quantity:  30 tablet   Refills:  3                Primary Care Provider Office Phone # Fax #    Shay Kirkpatrick [phone]        [facility] [address] [facility] [address]  [facility] [address]        Thank you!     Thank you for choosing [facility]  for your care. Our goal is always to provide you with excellent care. Hearing back from our patients is one way we can continue to improve our services. Please take a few minutes to complete the written survey that you may receive in the mail after your visit with us. Thank you!             Your Updated Medication List - Protect others around you: Learn how to safely use, store and throw away your medicines at www.disposemymeds.org.          This list is accurate as of: 5/3/17  3:59 PM.  Always use your most recent med list.                   Brand Name Dispense Instructions for use    aspirin 325 MG tablet     180 tablet    1 tablet (325 mg) by Oral or Feeding Tube route daily       clotrimazole 10 MG Lozg lozenge    MYCELEX    70 each    Place 1 lozenge (1 Beatrice) inside cheek 3 times daily       cyclobenzaprine 10 MG tablet    FLEXERIL    90 tablet    Take 1 tablet (10 mg) by mouth 3 times daily as needed for muscle spasms       furosemide 20 MG tablet    LASIX    30 tablet    Take 2 tablets (40 mg) by mouth daily       gabapentin 300 MG capsule    NEURONTIN    90 capsule    Take 1 capsule (300 mg) by mouth 3 times daily       glucagon 1 MG Solr injection     1 each    Inject 1 mg Subcutaneous every 15 minutes as needed for low blood sugar (May repeat x 1 only)       * insulin aspart 100 UNIT/ML injection    NovoLOG PEN    3 mL    DOSE:  1 units per 8 grams of carbohydrate. With meals and snacks. Only chart total amount of units given.  Do not give if pre-prandial glucose is less than 60 mg/dL.       * insulin aspart 100 UNIT/ML injection    NovoLOG PEN    3 mL    Inject 1-10 Units Subcutaneous 3 times daily (before meals) For Pre-Meal  - 189 give 1 unit.  For Pre-Meal  - 239 give 2 units.  For Pre-Meal  - 289 give 3 units.  For Pre-Meal  - 339 give 4 units.  For Pre-Meal -399 give 5 units. For Pre-Meal -449 give 6 units. For Pre-Meal  BG = or > 450 give 7 units.       * insulin aspart 100 UNIT/ML injection    NovoLOG PEN    3 mL    Inject 1-7 Units Subcutaneous At Bedtime Bedtime Blood Glucose (BG) For  - 249 give 1 units.  For  - 299 give 2 units.  For  - 349 give 3 units. For - 399 give 4 units.  For BG = or > 400 give 5 units.       insulin glargine 100 UNIT/ML injection    LANTUS    3 mL    Inject 45 Units Subcutaneous every 24 hours       LACTOBACILLUS EXTRA STRENGTH Caps     30 capsule    Take 1 capsule by mouth 2 times daily       lidocaine 5 % Patch    LIDODERM    30 patch    Place 1 patch onto the skin every 24 hours       magnesium oxide 400 MG tablet    MAG-OX    7 tablet    Take 1 tablet (400 mg) by mouth 2 times daily       megestrol 40 MG/ML suspension    MEGACE    600 mL    Take 10 mLs (400 mg) by mouth 2 times daily       multivitamin, therapeutic with minerals Tabs tablet     30 each    Take 1 tablet by mouth daily       mycophenolate 250 MG capsule    CELLCEPT - GENERIC EQUIVALENT    240 capsule    Take 4 capsules (1,000 mg) by mouth 2 times daily       omeprazole 20 MG CR capsule    priLOSEC    60 capsule    Take 1 capsule (20 mg) by mouth 2 times daily (before meals)       ondansetron 4 MG ODT tab    ZOFRAN-ODT    30 tablet    Take 1 tablet (4 mg) by mouth every 8 hours as needed for nausea       oxyCODONE 5 MG IR tablet    ROXICODONE    40 tablet    Take 1-2 tablets (5-10 mg) by mouth every 4 hours as needed for moderate to severe pain       prochlorperazine 5 MG tablet    COMPAZINE    90 tablet    Take 1-2 tablets (5-10 mg) by mouth every 6 hours as needed for nausea or vomiting       senna-docusate 8.6-50 MG per tablet    SENOKOT-S;PERICOLACE    100 tablet    Take 2 tablets by mouth 2 times daily       sulfamethoxazole-trimethoprim 400-80 MG per tablet    BACTRIM/SEPTRA    30 tablet    Take 1 tablet by mouth daily       tacrolimus 1 MG capsule    PROGRAF - GENERIC EQUIVALENT    360 capsule    Take 6  capsules (6 mg) by mouth every 12 hours       valGANciclovir 450 MG tablet    VALCYTE    60 tablet    Take 1 tablet (450 mg) by mouth daily       * Notice:  This list has 3 medication(s) that are the same as other medications prescribed for you. Read the directions carefully, and ask your doctor or other care provider to review them with you.

## 2017-05-04 ENCOUNTER — OFFICE VISIT (OUTPATIENT)
Dept: UROLOGY | Facility: CLINIC | Age: 52
End: 2017-05-04
Payer: COMMERCIAL

## 2017-05-04 VITALS
DIASTOLIC BLOOD PRESSURE: 64 MMHG | WEIGHT: 233 LBS | HEART RATE: 76 BPM | BODY MASS INDEX: 31.56 KG/M2 | HEIGHT: 72 IN | SYSTOLIC BLOOD PRESSURE: 142 MMHG

## 2017-05-04 DIAGNOSIS — N52.2 DRUG-INDUCED ERECTILE DYSFUNCTION: Primary | ICD-10-CM

## 2017-05-04 PROCEDURE — 99203 OFFICE O/P NEW LOW 30 MIN: CPT | Performed by: UROLOGY

## 2017-05-04 RX ORDER — TADALAFIL 5 MG/1
5 TABLET ORAL DAILY
Qty: 30 TABLET | Refills: 1 | Status: ON HOLD | OUTPATIENT
Start: 2017-05-04 | End: 2017-08-22

## 2017-05-04 RX ORDER — TADALAFIL 5 MG/1
5 TABLET ORAL DAILY
Qty: 30 TABLET | Refills: 11 | Status: SHIPPED | OUTPATIENT
Start: 2017-05-04 | End: 2017-10-24

## 2017-05-04 ASSESSMENT — PAIN SCALES - GENERAL: PAINLEVEL: EXTREME PAIN (8)

## 2017-05-04 NOTE — PROGRESS NOTES
History: This is a great pleasure to see this very pleasant gentleman in initial consultation today at the request of his personal physicians.  He has a very complicated history that is listed below.  His main concern today is one of erectile dysfunction.  He has a history of type 2 diabetes, and liver cancer having had a liver transplant 2 months ago for which he is still recovering.  He however is ambulating well and feels that he is at this point recovering well.  He is getting some slight erections but there has been considerable reduction in their potential over the last few months.  There is no evidence of a history of angina and is not taking any medication continue nitrates.      Past Medical History:   Diagnosis Date     Acute venous embolism and thrombosis of other specified veins 11/01    Deep vein thrombophlebitis, filter placed     Cancer (H)      Depressive disorder, not elsewhere classified      Esophageal reflux      Fibromyalgia 1/2009    dx with Dr Benitez( [facility])     History of thrombophlebitis      Osteoarthritis      Other and unspecified hyperlipidemia      Other chronic nonalcoholic liver disease     Fatty liver      Other testicular hypofunction      Pneumonia, organism unspecified 10-01    Included ARDS, sepsis, and  acute renal failure; hospitalized     Rheumatoid arthritis(714.0)      Type II or unspecified type diabetes mellitus without mention of complication, not stated as uncontrolled 11/01    Managed by endocrinology     Unspecified essential hypertension 11/01    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     Unspecified sleep apnea     Uses BiPAP       Past Surgical History:   Procedure Laterality Date     BENCH LIVER N/A 3/4/2017    Procedure: BENCH LIVER;  Surgeon: Jovan Tran MD;  Location: [facility] OR     C NONSPECIFIC PROCEDURE      tracheostomy     C NONSPECIFIC PROCEDURE      repair of deviated septum     C NONSPECIFIC PROCEDURE  2007    Rt knee arthroscopy     C TOTAL  KNEE ARTHROPLASTY  2008    Right knee arthroscopy     CHOLECYSTECTOMY       ESOPHAGOSCOPY, GASTROSCOPY, DUODENOSCOPY (EGD), COMBINED N/A 2016    Procedure: COMBINED ESOPHAGOSCOPY, GASTROSCOPY, DUODENOSCOPY (EGD), BIOPSY SINGLE OR MULTIPLE;  Surgeon: Trent Pederson MD;  Location:  GI     TRANSPLANT LIVER RECIPIENT  DONOR N/A 3/4/2017    Procedure: TRANSPLANT LIVER RECIPIENT  DONOR;  Surgeon: Jovan Tran MD;  Location:  OR       Family History   Problem Relation Age of Onset     DIABETES Father      Hypertension Father      Substance Abuse Father      Arthritis Mother      CANCER Mother      Thyroid     Thyroid Disease Mother      Other Cancer Mother      Colon Cancer No family hx of      Hyperlipidemia No family hx of      Coronary Artery Disease No family hx of      CEREBROVASCULAR DISEASE No family hx of      Breast Cancer No family hx of      Prostate Cancer No family hx of        Social History     Social History     Marital status:      Spouse name: N/A     Number of children: 1     Years of education: N/A     Occupational History            Social History Main Topics     Smoking status: Former Smoker     Smokeless tobacco: Former User     Types: Chew     Quit date: 10/8/2015      Comment: Has used chewing tobacco since age 15 , chewed for 20yrs      Alcohol use No      Comment: last drink about a year ago (quit )     Drug use: No     Sexual activity: Yes     Partners: Female     Other Topics Concern     Not on file     Social History Narrative    uSED TO BE      Back in school now           Current Outpatient Prescriptions   Medication Sig Dispense Refill     Linagliptin (TRADJENTA PO) Take 5 mg by mouth       tadalafil (CIALIS) 5 MG tablet Take 1 tablet (5 mg) by mouth daily Never use with nitroglycerin, terazosin or doxazosin. 30 tablet 11     tadalafil (CIALIS) 5 MG tablet Take 1 tablet (5 mg) by mouth daily Never use  with nitroglycerin, terazosin or doxazosin. 30 tablet 1     cyclobenzaprine (FLEXERIL) 10 MG tablet Take 1 tablet (10 mg) by mouth 3 times daily as needed for muscle spasms 90 tablet 0     omeprazole (PRILOSEC) 20 MG CR capsule Take 1 capsule (20 mg) by mouth 2 times daily (before meals) 60 capsule 11     clotrimazole (MYCELEX) 10 MG LOZG lozenge Place 1 lozenge (1 Beatrice) inside cheek 3 times daily 70 each 3     tacrolimus (PROGRAF - GENERIC EQUIVALENT) 1 MG capsule Take 6 capsules (6 mg) by mouth every 12 hours 360 capsule 11     mycophenolate (CELLCEPT - GENERIC EQUIVALENT) 250 MG capsule Take 4 capsules (1,000 mg) by mouth 2 times daily 240 capsule 3     furosemide (LASIX) 20 MG tablet Take 2 tablets (40 mg) by mouth daily (Patient taking differently: Take 40 mg by mouth every other day ) 30 tablet 3     magnesium oxide (MAG-OX) 400 MG tablet Take 1 tablet (400 mg) by mouth 2 times daily 7 tablet 1     megestrol (MEGACE) 40 MG/ML suspension Take 10 mLs (400 mg) by mouth 2 times daily 600 mL 3     lidocaine (LIDODERM) 5 % Patch Place 1 patch onto the skin every 24 hours (Patient not taking: Reported on 4/24/2017) 30 patch 1     LACTOBACILLUS EXTRA STRENGTH CAPS Take 1 capsule by mouth 2 times daily 30 capsule 1     prochlorperazine (COMPAZINE) 5 MG tablet Take 1-2 tablets (5-10 mg) by mouth every 6 hours as needed for nausea or vomiting 90 tablet 0     gabapentin (NEURONTIN) 300 MG capsule Take 1 capsule (300 mg) by mouth 3 times daily 90 capsule 1     oxyCODONE (ROXICODONE) 5 MG IR tablet Take 1-2 tablets (5-10 mg) by mouth every 4 hours as needed for moderate to severe pain 40 tablet 0     aspirin 325 MG tablet 1 tablet (325 mg) by Oral or Feeding Tube route daily 180 tablet 4     insulin aspart (NOVOLOG PEN) 100 UNIT/ML injection DOSE:  1 units per 8 grams of carbohydrate. With meals and snacks. Only chart total amount of units given.  Do not give if pre-prandial glucose is less than 60 mg/dL. 3 mL 3      insulin glargine (LANTUS) 100 UNIT/ML injection Inject 45 Units Subcutaneous every 24 hours 3 mL 3     valGANciclovir (VALCYTE) 450 MG tablet Take 1 tablet (450 mg) by mouth daily 60 tablet 5     senna-docusate (SENOKOT-S;PERICOLACE) 8.6-50 MG per tablet Take 2 tablets by mouth 2 times daily 100 tablet 1     sulfamethoxazole-trimethoprim (BACTRIM/SEPTRA) 400-80 MG per tablet Take 1 tablet by mouth daily 30 tablet 5     multivitamin, therapeutic with minerals (THERA-VIT-M) TABS tablet Take 1 tablet by mouth daily 30 each 11     insulin aspart (NOVOLOG PEN) 100 UNIT/ML injection Inject 1-10 Units Subcutaneous 3 times daily (before meals) For Pre-Meal  - 189 give 1 unit.   For Pre-Meal  - 239 give 2 units.   For Pre-Meal  - 289 give 3 units.   For Pre-Meal  - 339 give 4 units.   For Pre-Meal -399 give 5 units.  For Pre-Meal -449 give 6 units.  For Pre-Meal BG = or > 450 give 7 units. 3 mL 3     insulin aspart (NOVOLOG PEN) 100 UNIT/ML injection Inject 1-7 Units Subcutaneous At Bedtime Bedtime Blood Glucose (BG)  For  - 249 give 1 units.   For  - 299 give 2 units.   For  - 349 give 3 units.  For - 399 give 4 units.   For BG = or > 400 give 5 units. 3 mL 3     ondansetron (ZOFRAN-ODT) 4 MG ODT tab Take 1 tablet (4 mg) by mouth every 8 hours as needed for nausea 30 tablet 0     glucagon 1 MG SOLR injection Inject 1 mg Subcutaneous every 15 minutes as needed for low blood sugar (May repeat x 1 only) 1 each 0       Review Of Systems:  Skin: negative  Eyes: negative  Ears/Nose/Throat: negative  Respiratory: No shortness of breath, dyspnea on exertion, cough, or hemoptysis  Cardiovascular:  There is a history of fron history of thromboembolic diseasetal  Gastrointestinal: liver cancer   Genitourinary: negative  Musculoskeletal: negative  Neurologic: negative  Psychiatric: negative  Hematologic/Lymphatic/Immunologic: Anemia   Endocrine: diabetes    Exam:  /64  (BP Location: Right arm)  Pulse 76  Ht 1.829 m (6')  Wt 105.7 kg (233 lb)  BMI 31.6 kg/m2    General Impression: Very pleasant gentleman in no acute distress, well-oriented in time place and perso.    Mental status.  Normal.    HEENT.  There is no evidence of jaundice in the mucous membranes are normal     Skin: Skin is pale.  He is going on hereLymp    mph Nodes:   Respiratory System: The respiratory cycle is normallar:     Abdominal: Soft abdomen, well-healed surgical scar, no palpable masses no herni    Extremities.  Good peripheral pulses, no peripheral edema.    Genitalia.  Uncircumcised penis with normal size meatus.  Well-developed testes with no other remarkable features.    Rectal examination.  Not done    Neurological examination.  There is slight hypoesthesia of his feet but no other focal abnormal clinical neurological signs     Impression: The patient has had a recent liver transplant, has type 2 diabetes and is concerned about sexual dysfunction.  I did have a lengthy discussion with him today about precautions in light of his medical situation with regard to treatments for sexual dysfunction.  He is however not taking any nitrate-containing medication.  We had a discussion there for about oral agents such as Cialis and Viagra, the use of an erect aid device, intracorporeal pharmacotherapy and a penile prosthesis.  I went over the complexities of each of these incisions.  I in my opinion we should approach this cautiously and I'm recommending that he start with Cialis is 5 mg q.d., but only after discussing this with his hepatic specialist to make sure there is no potential for any interaction with any of the large number of medications.  He is taking.  If this is satisfactory we can observe his progress and I have recommended that consult with me again.  Further issues arise.  I may consider asking Dr. Denis Ernts to see him in view of the complexities of the situation if he is not making a  "satisfactory response to initial therapy.  I did discuss the entire situation with the patient in detail.  I answered all his questions    Plan.  I lasted to contact me again should the current plan not be satisfactory and we can have further discussions about other alternative potential treatments.  If it appears to be highly complicated than I will ask Dr. Denis Perkins at the [facility] for his opinion.    Time.  45 minutes.  Greater than 50% was spent in discussion and consultation.    \"This dictation was performed with voice recognition software and may contain errors,  omissions and inadvertent word substitution.\"        Answers for HPI/ROS submitted by the patient on 4/20/2017   General Symptoms: Yes  Skin Symptoms: No  HENT Symptoms: No  EYE SYMPTOMS: No  HEART SYMPTOMS: No  LUNG SYMPTOMS: No  INTESTINAL SYMPTOMS: No  URINARY SYMPTOMS: No  REPRODUCTIVE SYMPTOMS: Yes  SKELETAL SYMPTOMS: No  BLOOD SYMPTOMS: No  NERVOUS SYSTEM SYMPTOMS: No  MENTAL HEALTH SYMPTOMS: No  Fever: No  Loss of appetite: Yes  Weight loss: No  Weight gain: No  Fatigue: No  Night sweats: No  Chills: No  Increased stress: No  Excessive hunger: No  Excessive thirst: No  Feeling hot or cold when others believe the temperature is normal: No  Post-operative complications: Yes  Surgical site pain: No  Hallucinations: No  Change in or Loss of Energy: No  Hyperactivity: No  Confusion: No  Scrotal pain or swelling: No  Erectile dysfunction: Yes  Penile discharge: No  Genital ulcers: No  Reduced libido: No    "

## 2017-05-04 NOTE — MR AVS SNAPSHOT
After Visit Summary   5/4/2017    Camacho Bhagat    MRN: [id number]           Patient Information     Date Of Birth          1965        Visit Information        Provider Department      5/4/2017 2:20 PM Toñito Rivas MD [facility] [place]        Today's Diagnoses     Drug-induced erectile dysfunction    -  1       Follow-ups after your visit        Follow-up notes from your care team     Return if symptoms worsen or fail to improve.      Your next 10 appointments already scheduled     May 08, 2017 11:50 AM CDT   (Arrive by 11:35 AM)   Liver Return Post Op with Jovan Tran MD   [facility] Solid Organ Transplant ([facility])    [address] [place] [address]  3rd [facility] [address]   [phone]            May 11, 2017  3:00 PM CDT   (Arrive by 2:45 PM)   Return Visit with Shay Kirkpatrick MD   [facility] ([facility])    [address] [place] [address]  4th [facility] [address]   [phone]            Jun 07, 2017 12:45 PM CDT   (Arrive by 12:30 PM)   Return General Liver with Toñito Camejo MD   [facility] Hepatology ([facility])    [address] [place] [address]  3rd [facility] [id number] [phone]              Who to contact     If you have questions or need follow up information about today's clinic visit or your schedule please contact [facility] [place] directly at [phone].  Normal or non-critical lab and imaging results will be communicated to you by MyChart, letter or phone within 4 business days after the clinic has received the results. If you do not hear from us within 7 days, please contact the clinic through MyChart or phone. If you have a critical or abnormal lab result, we will notify you by phone as soon as possible.  Submit refill requests through [email or web address] or call your pharmacy and they will forward  the refill request to us. Please allow 3 business days for your refill to be completed.          Additional Information About Your Visit        [facility] Information     [facility] gives you secure access to your electronic health record. If you see a primary care provider, you can also send messages to your care team and make appointments. If you have questions, please call your primary care clinic.  If you do not have a primary care provider, please call [phone] and they will assist you.        Care EveryWhere ID     This is your Care EveryWhere ID. This could be used by other organizations to access your [facility] medical records  [id number]        Your Vitals Were     Pulse Height BMI (Body Mass Index)             76 1.829 m (6') 31.6 kg/m2          Blood Pressure from Last 3 Encounters:   05/04/17 142/64   04/24/17 (!) 193/105   04/17/17 (!) 185/98    Weight from Last 3 Encounters:   05/04/17 105.7 kg (233 lb)   04/24/17 106.1 kg (233 lb 14.4 oz)   04/18/17 111.6 kg (246 lb)              Today, you had the following     No orders found for display         Today's Medication Changes          These changes are accurate as of: 5/4/17  3:28 PM.  If you have any questions, ask your nurse or doctor.               Start taking these medicines.        Dose/Directions    * tadalafil 5 MG tablet   Commonly known as:  CIALIS   Used for:  Drug-induced erectile dysfunction   Started by:  Toñito Rivas MD        Dose:  5 mg   Take 1 tablet (5 mg) by mouth daily Never use with nitroglycerin, terazosin or doxazosin.   Quantity:  30 tablet   Refills:  11       * tadalafil 5 MG tablet   Commonly known as:  CIALIS   Used for:  Drug-induced erectile dysfunction   Started by:  Toñito Rivas MD        Dose:  5 mg   Take 1 tablet (5 mg) by mouth daily Never use with nitroglycerin, terazosin or doxazosin.   Quantity:  30 tablet   Refills:  1       * Notice:  This list has 2 medication(s) that are the same as other  medications prescribed for you. Read the directions carefully, and ask your doctor or other care provider to review them with you.      These medicines have changed or have updated prescriptions.        Dose/Directions    furosemide 20 MG tablet   Commonly known as:  LASIX   This may have changed:  when to take this   Used for:  Liver replaced by transplant (H)        Dose:  40 mg   Take 2 tablets (40 mg) by mouth daily   Quantity:  30 tablet   Refills:  3            Where to get your medicines      Some of these will need a paper prescription and others can be bought over the counter.  Ask your nurse if you have questions.     Bring a paper prescription for each of these medications     tadalafil 5 MG tablet    tadalafil 5 MG tablet                Primary Care Provider Office Phone # Fax #    Shay Kirkpatrick [phone]        [facility] [address] [place] [address] [address] [place] [id number]        Thank you!     Thank you for choosing [facility] [place]  for your care. Our goal is always to provide you with excellent care. Hearing back from our patients is one way we can continue to improve our services. Please take a few minutes to complete the written survey that you may receive in the mail after your visit with us. Thank you!             Your Updated Medication List - Protect others around you: Learn how to safely use, store and throw away your medicines at www.disposemymeds.org.          This list is accurate as of: 5/4/17  3:28 PM.  Always use your most recent med list.                   Brand Name Dispense Instructions for use    aspirin 325 MG tablet     180 tablet    1 tablet (325 mg) by Oral or Feeding Tube route daily       clotrimazole 10 MG Lozg lozenge    MYCELEX    70 each    Place 1 lozenge (1 Beatrice) inside cheek 3 times daily       cyclobenzaprine 10 MG tablet    FLEXERIL    90 tablet    Take 1 tablet (10 mg) by mouth 3 times daily as needed for muscle  spasms       furosemide 20 MG tablet    LASIX    30 tablet    Take 2 tablets (40 mg) by mouth daily       gabapentin 300 MG capsule    NEURONTIN    90 capsule    Take 1 capsule (300 mg) by mouth 3 times daily       glucagon 1 MG Solr injection     1 each    Inject 1 mg Subcutaneous every 15 minutes as needed for low blood sugar (May repeat x 1 only)       * insulin aspart 100 UNIT/ML injection    NovoLOG PEN    3 mL    DOSE:  1 units per 8 grams of carbohydrate. With meals and snacks. Only chart total amount of units given.  Do not give if pre-prandial glucose is less than 60 mg/dL.       * insulin aspart 100 UNIT/ML injection    NovoLOG PEN    3 mL    Inject 1-10 Units Subcutaneous 3 times daily (before meals) For Pre-Meal  - 189 give 1 unit.  For Pre-Meal  - 239 give 2 units.  For Pre-Meal  - 289 give 3 units.  For Pre-Meal  - 339 give 4 units.  For Pre-Meal -399 give 5 units. For Pre-Meal -449 give 6 units. For Pre-Meal BG = or > 450 give 7 units.       * insulin aspart 100 UNIT/ML injection    NovoLOG PEN    3 mL    Inject 1-7 Units Subcutaneous At Bedtime Bedtime Blood Glucose (BG) For  - 249 give 1 units.  For  - 299 give 2 units.  For  - 349 give 3 units. For - 399 give 4 units.  For BG = or > 400 give 5 units.       insulin glargine 100 UNIT/ML injection    LANTUS    3 mL    Inject 45 Units Subcutaneous every 24 hours       LACTOBACILLUS EXTRA STRENGTH Caps     30 capsule    Take 1 capsule by mouth 2 times daily       lidocaine 5 % Patch    LIDODERM    30 patch    Place 1 patch onto the skin every 24 hours       magnesium oxide 400 MG tablet    MAG-OX    7 tablet    Take 1 tablet (400 mg) by mouth 2 times daily       megestrol 40 MG/ML suspension    MEGACE    600 mL    Take 10 mLs (400 mg) by mouth 2 times daily       multivitamin, therapeutic with minerals Tabs tablet     30 each    Take 1 tablet by mouth daily       mycophenolate 250 MG capsule     CELLCEPT - GENERIC EQUIVALENT    240 capsule    Take 4 capsules (1,000 mg) by mouth 2 times daily       omeprazole 20 MG CR capsule    priLOSEC    60 capsule    Take 1 capsule (20 mg) by mouth 2 times daily (before meals)       ondansetron 4 MG ODT tab    ZOFRAN-ODT    30 tablet    Take 1 tablet (4 mg) by mouth every 8 hours as needed for nausea       oxyCODONE 5 MG IR tablet    ROXICODONE    40 tablet    Take 1-2 tablets (5-10 mg) by mouth every 4 hours as needed for moderate to severe pain       prochlorperazine 5 MG tablet    COMPAZINE    90 tablet    Take 1-2 tablets (5-10 mg) by mouth every 6 hours as needed for nausea or vomiting       senna-docusate 8.6-50 MG per tablet    SENOKOT-S;PERICOLACE    100 tablet    Take 2 tablets by mouth 2 times daily       sulfamethoxazole-trimethoprim 400-80 MG per tablet    BACTRIM/SEPTRA    30 tablet    Take 1 tablet by mouth daily       tacrolimus 1 MG capsule    PROGRAF - GENERIC EQUIVALENT    360 capsule    Take 6 capsules (6 mg) by mouth every 12 hours       * tadalafil 5 MG tablet    CIALIS    30 tablet    Take 1 tablet (5 mg) by mouth daily Never use with nitroglycerin, terazosin or doxazosin.       * tadalafil 5 MG tablet    CIALIS    30 tablet    Take 1 tablet (5 mg) by mouth daily Never use with nitroglycerin, terazosin or doxazosin.       TRADJENTA PO      Take 5 mg by mouth       valGANciclovir 450 MG tablet    VALCYTE    60 tablet    Take 1 tablet (450 mg) by mouth daily       * Notice:  This list has 5 medication(s) that are the same as other medications prescribed for you. Read the directions carefully, and ask your doctor or other care provider to review them with you.

## 2017-05-04 NOTE — LETTER
5/4/2017       RE: Camacho Bhagat  [address] [facility] [address]     Dear Colleague,    Thank you for referring your patient, Camacho Bhagat, to the [facility] [place] [facility]. Please see a copy of my visit note below.    History: This is a great pleasure to see this very pleasant gentleman in initial consultation today at the request of his personal physicians.  He has a very complicated history that is listed below.  His main concern today is one of erectile dysfunction.  He has a history of type 2 diabetes, and liver cancer having had a liver transplant 2 months ago for which he is still recovering.  He however is ambulating well and feels that he is at this point recovering well.  He is getting some slight erections but there has been considerable reduction in their potential over the last few months.  There is no evidence of a history of angina and is not taking any medication continue nitrates.      Past Medical History:   Diagnosis Date     Acute venous embolism and thrombosis of other specified veins 11/01    Deep vein thrombophlebitis, filter placed     Cancer (H)      Depressive disorder, not elsewhere classified      Esophageal reflux      Fibromyalgia 1/2009    dx with Dr Benitez( Rheum)     History of thrombophlebitis      Osteoarthritis      Other and unspecified hyperlipidemia      Other chronic nonalcoholic liver disease     Fatty liver      Other testicular hypofunction      Pneumonia, organism unspecified 10-01    Included ARDS, sepsis, and  acute renal failure; hospitalized     Rheumatoid arthritis(714.0)      Type II or unspecified type diabetes mellitus without mention of complication, not stated as uncontrolled 11/01    Managed by endocrinology     Unspecified essential hypertension 11/01    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     Unspecified sleep apnea     Uses BiPAP       Past Surgical History:    Procedure Laterality Date     BENCH LIVER N/A 3/4/2017    Procedure: BENCH LIVER;  Surgeon: Jovan Tran MD;  Location: [facility] OR     C NONSPECIFIC PROCEDURE      tracheostomy     C NONSPECIFIC PROCEDURE      repair of deviated septum     C NONSPECIFIC PROCEDURE      Rt knee arthroscopy     C TOTAL KNEE ARTHROPLASTY  2008    Right knee arthroscopy     CHOLECYSTECTOMY       ESOPHAGOSCOPY, GASTROSCOPY, DUODENOSCOPY (EGD), COMBINED N/A 2016    Procedure: COMBINED ESOPHAGOSCOPY, GASTROSCOPY, DUODENOSCOPY (EGD), BIOPSY SINGLE OR MULTIPLE;  Surgeon: Trent Pederson MD;  Location: [facility]     TRANSPLANT LIVER RECIPIENT  DONOR N/A 3/4/2017    Procedure: TRANSPLANT LIVER RECIPIENT  DONOR;  Surgeon: Jovan Tran MD;  Location: [facility] OR       Family History   Problem Relation Age of Onset     DIABETES Father      Hypertension Father      Substance Abuse Father      Arthritis Mother      CANCER Mother      Thyroid     Thyroid Disease Mother      Other Cancer Mother      Colon Cancer No family hx of      Hyperlipidemia No family hx of      Coronary Artery Disease No family hx of      CEREBROVASCULAR DISEASE No family hx of      Breast Cancer No family hx of      Prostate Cancer No family hx of        Social History     Social History     Marital status:      Spouse name: N/A     Number of children: 1     Years of education: N/A     Occupational History            Social History Main Topics     Smoking status: Former Smoker     Smokeless tobacco: Former User     Types: Chew     Quit date: 10/8/2015      Comment: Has used chewing tobacco since age 15 , chewed for 20yrs      Alcohol use No      Comment: last drink about a year ago (quit )     Drug use: No     Sexual activity: Yes     Partners: Female     Other Topics Concern     Not on file     Social History Narrative    uSED TO BE      Back in school now           Current Outpatient  Prescriptions   Medication Sig Dispense Refill     Linagliptin (TRADJENTA PO) Take 5 mg by mouth       tadalafil (CIALIS) 5 MG tablet Take 1 tablet (5 mg) by mouth daily Never use with nitroglycerin, terazosin or doxazosin. 30 tablet 11     tadalafil (CIALIS) 5 MG tablet Take 1 tablet (5 mg) by mouth daily Never use with nitroglycerin, terazosin or doxazosin. 30 tablet 1     cyclobenzaprine (FLEXERIL) 10 MG tablet Take 1 tablet (10 mg) by mouth 3 times daily as needed for muscle spasms 90 tablet 0     omeprazole (PRILOSEC) 20 MG CR capsule Take 1 capsule (20 mg) by mouth 2 times daily (before meals) 60 capsule 11     clotrimazole (MYCELEX) 10 MG LOZG lozenge Place 1 lozenge (1 Beatrice) inside cheek 3 times daily 70 each 3     tacrolimus (PROGRAF - GENERIC EQUIVALENT) 1 MG capsule Take 6 capsules (6 mg) by mouth every 12 hours 360 capsule 11     mycophenolate (CELLCEPT - GENERIC EQUIVALENT) 250 MG capsule Take 4 capsules (1,000 mg) by mouth 2 times daily 240 capsule 3     furosemide (LASIX) 20 MG tablet Take 2 tablets (40 mg) by mouth daily (Patient taking differently: Take 40 mg by mouth every other day ) 30 tablet 3     magnesium oxide (MAG-OX) 400 MG tablet Take 1 tablet (400 mg) by mouth 2 times daily 7 tablet 1     megestrol (MEGACE) 40 MG/ML suspension Take 10 mLs (400 mg) by mouth 2 times daily 600 mL 3     lidocaine (LIDODERM) 5 % Patch Place 1 patch onto the skin every 24 hours (Patient not taking: Reported on 4/24/2017) 30 patch 1     LACTOBACILLUS EXTRA STRENGTH CAPS Take 1 capsule by mouth 2 times daily 30 capsule 1     prochlorperazine (COMPAZINE) 5 MG tablet Take 1-2 tablets (5-10 mg) by mouth every 6 hours as needed for nausea or vomiting 90 tablet 0     gabapentin (NEURONTIN) 300 MG capsule Take 1 capsule (300 mg) by mouth 3 times daily 90 capsule 1     oxyCODONE (ROXICODONE) 5 MG IR tablet Take 1-2 tablets (5-10 mg) by mouth every 4 hours as needed for moderate to severe pain 40 tablet 0     aspirin  325 MG tablet 1 tablet (325 mg) by Oral or Feeding Tube route daily 180 tablet 4     insulin aspart (NOVOLOG PEN) 100 UNIT/ML injection DOSE:  1 units per 8 grams of carbohydrate. With meals and snacks. Only chart total amount of units given.  Do not give if pre-prandial glucose is less than 60 mg/dL. 3 mL 3     insulin glargine (LANTUS) 100 UNIT/ML injection Inject 45 Units Subcutaneous every 24 hours 3 mL 3     valGANciclovir (VALCYTE) 450 MG tablet Take 1 tablet (450 mg) by mouth daily 60 tablet 5     senna-docusate (SENOKOT-S;PERICOLACE) 8.6-50 MG per tablet Take 2 tablets by mouth 2 times daily 100 tablet 1     sulfamethoxazole-trimethoprim (BACTRIM/SEPTRA) 400-80 MG per tablet Take 1 tablet by mouth daily 30 tablet 5     multivitamin, therapeutic with minerals (THERA-VIT-M) TABS tablet Take 1 tablet by mouth daily 30 each 11     insulin aspart (NOVOLOG PEN) 100 UNIT/ML injection Inject 1-10 Units Subcutaneous 3 times daily (before meals) For Pre-Meal  - 189 give 1 unit.   For Pre-Meal  - 239 give 2 units.   For Pre-Meal  - 289 give 3 units.   For Pre-Meal  - 339 give 4 units.   For Pre-Meal -399 give 5 units.  For Pre-Meal -449 give 6 units.  For Pre-Meal BG = or > 450 give 7 units. 3 mL 3     insulin aspart (NOVOLOG PEN) 100 UNIT/ML injection Inject 1-7 Units Subcutaneous At Bedtime Bedtime Blood Glucose (BG)  For  - 249 give 1 units.   For  - 299 give 2 units.   For  - 349 give 3 units.  For - 399 give 4 units.   For BG = or > 400 give 5 units. 3 mL 3     ondansetron (ZOFRAN-ODT) 4 MG ODT tab Take 1 tablet (4 mg) by mouth every 8 hours as needed for nausea 30 tablet 0     glucagon 1 MG SOLR injection Inject 1 mg Subcutaneous every 15 minutes as needed for low blood sugar (May repeat x 1 only) 1 each 0     Review Of Systems:  Skin: negative  Eyes: negative  Ears/Nose/Throat: negative  Respiratory: No shortness of breath, dyspnea on exertion, cough,  or hemoptysis  Cardiovascular:  There is a history of fron history of thromboembolic diseasetal  Gastrointestinal: liver cancer   Genitourinary: negative  Musculoskeletal: negative  Neurologic: negative  Psychiatric: negative  Hematologic/Lymphatic/Immunologic: Anemia   Endocrine: diabetes    Exam:  /64 (BP Location: Right arm)  Pulse 76  Ht 1.829 m (6')  Wt 105.7 kg (233 lb)  BMI 31.6 kg/m2    General Impression: Very pleasant gentleman in no acute distress, well-oriented in time place and perso.    Mental status.  Normal.    HEENT.  There is no evidence of jaundice in the mucous membranes are normal     Skin: Skin is pale.  He is going on hereLymp    mph Nodes:   Respiratory System: The respiratory cycle is normallar:     Abdominal: Soft abdomen, well-healed surgical scar, no palpable masses no herni    Extremities.  Good peripheral pulses, no peripheral edema.    Genitalia.  Uncircumcised penis with normal size meatus.  Well-developed testes with no other remarkable features.    Rectal examination.  Not done    Neurological examination.  There is slight hypoesthesia of his feet but no other focal abnormal clinical neurological signs     Impression: The patient has had a recent liver transplant, has type 2 diabetes and is concerned about sexual dysfunction.  I did have a lengthy discussion with him today about precautions in light of his medical situation with regard to treatments for sexual dysfunction.  He is however not taking any nitrate-containing medication.  We had a discussion there for about oral agents such as Cialis and Viagra, the use of an erect aid device, intracorporeal pharmacotherapy and a penile prosthesis.  I went over the complexities of each of these incisions.  I in my opinion we should approach this cautiously and I'm recommending that he start with Cialis is 5 mg q.d., but only after discussing this with his hepatic specialist to make sure there is no potential for any interaction  "with any of the large number of medications.  He is taking.  If this is satisfactory we can observe his progress and I have recommended that consult with me again.  Further issues arise.  I may consider asking Dr. Denis Ernst to see him in view of the complexities of the situation if he is not making a satisfactory response to initial therapy.  I did discuss the entire situation with the patient in detail.  I answered all his questions    Plan.  I lasted to contact me again should the current plan not be satisfactory and we can have further discussions about other alternative potential treatments.  If it appears to be highly complicated than I will ask Dr. Denis Perkins at the [facility] for his opinion.    Time.  45 minutes.  Greater than 50% was spent in discussion and consultation.  \"This dictation was performed with voice recognition software and may contain errors,  omissions and inadvertent word substitution.\"    Again, thank you for allowing me to participate in the care of your patient.      Sincerely,    Toñito Rivas MD  "

## 2017-05-05 NOTE — PROGRESS NOTES
Presence noted per  of IVC filter noted in abdominal xray of 4/12 /2017.    Orders placed for pt to be scheduled for removal of this filter in IR.   Pt reports has not been contacted about this removal, does not have an appt to have this done.

## 2017-05-08 ENCOUNTER — OFFICE VISIT (OUTPATIENT)
Dept: TRANSPLANT | Facility: CLINIC | Age: 52
End: 2017-05-08
Attending: TRANSPLANT SURGERY
Payer: COMMERCIAL

## 2017-05-08 ENCOUNTER — TELEPHONE (OUTPATIENT)
Dept: TRANSPLANT | Facility: CLINIC | Age: 52
End: 2017-05-08

## 2017-05-08 VITALS
HEART RATE: 95 BPM | RESPIRATION RATE: 16 BRPM | TEMPERATURE: 98.7 F | SYSTOLIC BLOOD PRESSURE: 169 MMHG | DIASTOLIC BLOOD PRESSURE: 94 MMHG | OXYGEN SATURATION: 100 %

## 2017-05-08 DIAGNOSIS — Z94.4 LIVER TRANSPLANT RECIPIENT (H): Primary | ICD-10-CM

## 2017-05-08 NOTE — NURSING NOTE
Chief Complaint   Patient presents with     Liver Transplant     POD 65       Initial BP (!) 169/94 (BP Location: Right arm, Patient Position: Chair, Cuff Size: Adult Large)  Pulse 95  Temp 98.7  F (37.1  C) (Oral)  Resp 16  SpO2 100% Estimated body mass index is 31.6 kg/(m^2) as calculated from the following:    Height as of 5/4/17: 1.829 m (6').    Weight as of 5/4/17: 105.7 kg (233 lb).  Medication Reconciliation: incomplete

## 2017-05-08 NOTE — TELEPHONE ENCOUNTER
"Per Halie in IR:   The pt's IVC filter is     this is a permanent IVC filter. Tratrapeze\"; it cannot be removed.\"      "

## 2017-05-08 NOTE — TELEPHONE ENCOUNTER
Spoke with Camacho about the IVC filter,  That it was found to be a permanent filter type, and is not to be removed.  Camacho verbalized understanding of this.

## 2017-05-08 NOTE — PROGRESS NOTES
Transplant Surgery -OUTPATIENT IMMUNOSUPPRESSION PROGRESS NOTE    Date of Visit: 05/08/2017    Transplants:  3/4/2017 (Liver); Postoperative day:  65  ASSESMENT AND PLAN:  1.Graft Function:Liver allograft: no rejection or technical problems.    2.Immunosuppression Management: keep tacrolimus levels at 10  3.Hypertension:ok  4.Renal Function: ok  5.Lab frequency: weekly  6.Other:  Wound healed well     Date: May 8, 2017    Transplant:  [x]                             Liver [x]                              Kidney []                             Pancreas []                              Other:             Chief Complaint:Liver Transplant (POD 65)  Doing well, no fever or abdominal pain  History of Present Illness:  Patient Active Problem List   Diagnosis     Diabetes mellitus, type 2 (H)     Carpal tunnel syndrome     Essential hypertension     Personal history of thrombophlebitis     Esophageal reflux     Depressive disorder, not elsewhere classified     Hyperlipidemia     Sleep apnea     Testicular hypofunction     TYPE 2 DIABETES, HBA1C GOAL < 7%     HYPERLIPIDEMIA LDL GOAL <100     Fibromyalgia     OA (osteoarthritis)     Cirrhosis (H)     Sicca syndrome (H)     Knee pain     Primary localized osteoarthrosis, lower leg     Diabetes mellitus with neurological manifestation (H)     Medication refill- do not delete      Pain medication agreement signed - Shay Kirkpatrick 2/7/14     Hepatocellular carcinoma (H)     Hepatic cirrhosis, unspecified hepatic cirrhosis type (H)     Preoperative cardiovascular examination     HCC (hepatocellular carcinoma) (H)     Edema     Cellulitis of scrotum     Uncontrolled type 2 diabetes mellitus with hyperglycemia, with long-term current use of insulin (H)     Debility     Type 2 diabetes mellitus without complication, with long-term current use of insulin (H)     Altered mental status, unspecified     Hepatic encephalopathy (H)     Urinary tract infection associated with catheterization of  urinary tract (H)     Altered mental status     Hypoglycemia     Hepatic cirrhosis (H)     Osteoarthritis     Rheumatoid arthritis (H)     Hyperkalemia     Liver transplant recipient (H)     Acute kidney injury (H)     Immunosuppressed status (H)     Neck pain     Back pain     SOCIAL /FAMILY HISTORY: [x]                  No recent change    Past Medical History:   Diagnosis Date     Acute venous embolism and thrombosis of other specified veins     Deep vein thrombophlebitis, filter placed     Cancer (H)      Depressive disorder, not elsewhere classified      Esophageal reflux      Fibromyalgia 2009    dx with Dr Benitez( Rheum)     History of thrombophlebitis      Osteoarthritis      Other and unspecified hyperlipidemia      Other chronic nonalcoholic liver disease     Fatty liver      Other testicular hypofunction      Pneumonia, organism unspecified 10-01    Included ARDS, sepsis, and  acute renal failure; hospitalized     Rheumatoid arthritis(714.0)      Type II or unspecified type diabetes mellitus without mention of complication, not stated as uncontrolled     Managed by endocrinology     Unspecified essential hypertension     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     Unspecified sleep apnea     Uses BiPAP     Past Surgical History:   Procedure Laterality Date     BENCH LIVER N/A 3/4/2017    Procedure: BENCH LIVER;  Surgeon: Jovan Tran MD;  Location: [facility] OR     C NONSPECIFIC PROCEDURE      tracheostomy     C NONSPECIFIC PROCEDURE      repair of deviated septum     C NONSPECIFIC PROCEDURE      Rt knee arthroscopy     C TOTAL KNEE ARTHROPLASTY      Right knee arthroscopy     CHOLECYSTECTOMY       ESOPHAGOSCOPY, GASTROSCOPY, DUODENOSCOPY (EGD), COMBINED N/A 2016    Procedure: COMBINED ESOPHAGOSCOPY, GASTROSCOPY, DUODENOSCOPY (EGD), BIOPSY SINGLE OR MULTIPLE;  Surgeon: Trent Pederson MD;  Location:  GI     TRANSPLANT LIVER RECIPIENT  DONOR N/A 3/4/2017     Procedure: TRANSPLANT LIVER RECIPIENT  DONOR;  Surgeon: Jovan Tran MD;  Location:  OR     Social History     Social History     Marital status:      Spouse name: N/A     Number of children: 1     Years of education: N/A     Occupational History            Social History Main Topics     Smoking status: Former Smoker     Smokeless tobacco: Former User     Types: Chew     Quit date: 10/8/2015      Comment: Has used chewing tobacco since age 15 , chewed for 20yrs      Alcohol use No      Comment: last drink about a year ago (quit )     Drug use: No     Sexual activity: Yes     Partners: Female     Other Topics Concern     Not on file     Social History Narrative    uSED TO BE      Back in school now         Prescription Medications as of 2017             clotrimazole (MYCELEX) 10 MG LOZG lozenge Place 1 lozenge (1 Beatrice) inside cheek 4 times daily    Linagliptin (TRADJENTA PO) Take 5 mg by mouth    tadalafil (CIALIS) 5 MG tablet Take 1 tablet (5 mg) by mouth daily Never use with nitroglycerin, terazosin or doxazosin.    tadalafil (CIALIS) 5 MG tablet Take 1 tablet (5 mg) by mouth daily Never use with nitroglycerin, terazosin or doxazosin.    cyclobenzaprine (FLEXERIL) 10 MG tablet Take 1 tablet (10 mg) by mouth 3 times daily as needed for muscle spasms    omeprazole (PRILOSEC) 20 MG CR capsule Take 1 capsule (20 mg) by mouth 2 times daily (before meals)    tacrolimus (PROGRAF - GENERIC EQUIVALENT) 1 MG capsule Take 6 capsules (6 mg) by mouth every 12 hours    mycophenolate (CELLCEPT - GENERIC EQUIVALENT) 250 MG capsule Take 4 capsules (1,000 mg) by mouth 2 times daily    furosemide (LASIX) 20 MG tablet Take 2 tablets (40 mg) by mouth daily    magnesium oxide (MAG-OX) 400 MG tablet Take 1 tablet (400 mg) by mouth 2 times daily    megestrol (MEGACE) 40 MG/ML suspension Take 10 mLs (400 mg) by mouth 2 times daily    lidocaine (LIDODERM) 5 % Patch  Place 1 patch onto the skin every 24 hours    LACTOBACILLUS EXTRA STRENGTH CAPS Take 1 capsule by mouth 2 times daily    prochlorperazine (COMPAZINE) 5 MG tablet Take 1-2 tablets (5-10 mg) by mouth every 6 hours as needed for nausea or vomiting    gabapentin (NEURONTIN) 300 MG capsule Take 1 capsule (300 mg) by mouth 3 times daily    oxyCODONE (ROXICODONE) 5 MG IR tablet Take 1-2 tablets (5-10 mg) by mouth every 4 hours as needed for moderate to severe pain    aspirin 325 MG tablet 1 tablet (325 mg) by Oral or Feeding Tube route daily    insulin aspart (NOVOLOG PEN) 100 UNIT/ML injection DOSE:  1 units per 8 grams of carbohydrate. With meals and snacks. Only chart total amount of units given.  Do not give if pre-prandial glucose is less than 60 mg/dL.    insulin glargine (LANTUS) 100 UNIT/ML injection Inject 45 Units Subcutaneous every 24 hours    valGANciclovir (VALCYTE) 450 MG tablet Take 1 tablet (450 mg) by mouth daily    senna-docusate (SENOKOT-S;PERICOLACE) 8.6-50 MG per tablet Take 2 tablets by mouth 2 times daily    sulfamethoxazole-trimethoprim (BACTRIM/SEPTRA) 400-80 MG per tablet Take 1 tablet by mouth daily    multivitamin, therapeutic with minerals (THERA-VIT-M) TABS tablet Take 1 tablet by mouth daily    insulin aspart (NOVOLOG PEN) 100 UNIT/ML injection Inject 1-10 Units Subcutaneous 3 times daily (before meals) For Pre-Meal  - 189 give 1 unit.   For Pre-Meal  - 239 give 2 units.   For Pre-Meal  - 289 give 3 units.   For Pre-Meal  - 339 give 4 units.   For Pre-Meal -399 give 5 units.  For Pre-Meal -449 give 6 units.  For Pre-Meal BG = or > 450 give 7 units.    insulin aspart (NOVOLOG PEN) 100 UNIT/ML injection Inject 1-7 Units Subcutaneous At Bedtime Bedtime Blood Glucose (BG)  For  - 249 give 1 units.   For  - 299 give 2 units.   For  - 349 give 3 units.  For - 399 give 4 units.   For BG = or > 400 give 5 units.    ondansetron (ZOFRAN-ODT) 4  MG ODT tab Take 1 tablet (4 mg) by mouth every 8 hours as needed for nausea    glucagon 1 MG SOLR injection Inject 1 mg Subcutaneous every 15 minutes as needed for low blood sugar (May repeat x 1 only)        Erythromycin and Vioxx   REVIEW OF SYSTEMS (check box if normal)  [x]               GENERAL  [x]                 PULMONARY [x]                GENITOURINARY  [x]                CNS                 [x]                 CARDIAC  [x]                 ENDOCRINE  [x]                EARS,NOSE,THROAT [x]                 GASTROINTESTINAL [x]                 NEUROLOGIC    [x]                MUSCLOSKELTAL  [x]                  HEMATOLOGY      PHYSICAL EXAM (check box if normal)BP (!) 169/94 (BP Location: Right arm, Patient Position: Chair, Cuff Size: Adult Large)  Pulse 95  Temp 98.7  F (37.1  C) (Oral)  Resp 16  SpO2 100%        [x]            GENERAL:    [x]       EYES:  ICTERIC   []        YES  []                    NO  [x]           EXTREMITIES: Clubbing []       Y     [x]           N    [x]           EARS, NOSE, THROAT: Membranes Moist    YES   [x]                   NO []                  [x]           LUNGS:  CLEAR    YES       [x]                  NO    []                                [x]           SKIN: Jaundice           YES       []                  NO    [x]                   Rash: YES       []                  NO    [x]                                     [x]             HEART: Regular Rate          YES       [x]                  NO    []                   Incision Clean:  YES       [x]                  NO    []                                [x]                    ABDOMEN: Wound healthy Organomegaly YES       []                  NO    [x]                       [x]                    NEUROLOGICAL:  Nonfocal  YES       [x]                  NO    []                       [x]                    Hernia YES       []                  NO    [x]                   PSYCHIATRIC:  Appropriate  YES       [x]                   NO    []                       OTHER:                                                                                                   PAIN SCALE:: 3

## 2017-05-08 NOTE — LETTER
5/8/2017       RE: Camacho Bhagat  [address] [facility] [address]     Dear Colleague,    Thank you for referring your patient, Camacho Bhagat, to the [facility] SOLID ORGAN TRANSPLANT at [facility]. Please see a copy of my visit note below.    Transplant Surgery -OUTPATIENT IMMUNOSUPPRESSION PROGRESS NOTE    Date of Visit: 05/08/2017    Transplants:  3/4/2017 (Liver); Postoperative day:  65  ASSESMENT AND PLAN:  1.Graft Function:Liver allograft: no rejection or technical problems.    2.Immunosuppression Management: keep tacrolimus levels at 10  3.Hypertension:ok  4.Renal Function: ok  5.Lab frequency: weekly  6.Other:  Wound healed well     Date: May 8, 2017    Transplant:  [x]                             Liver [x]                              Kidney []                             Pancreas []                              Other:             Chief Complaint:Liver Transplant (POD 65)  Doing well, no fever or abdominal pain  History of Present Illness:  Patient Active Problem List   Diagnosis     Diabetes mellitus, type 2 (H)     Carpal tunnel syndrome     Essential hypertension     Personal history of thrombophlebitis     Esophageal reflux     Depressive disorder, not elsewhere classified     Hyperlipidemia     Sleep apnea     Testicular hypofunction     TYPE 2 DIABETES, HBA1C GOAL < 7%     HYPERLIPIDEMIA LDL GOAL <100     Fibromyalgia     OA (osteoarthritis)     Cirrhosis (H)     Sicca syndrome (H)     Knee pain     Primary localized osteoarthrosis, lower leg     Diabetes mellitus with neurological manifestation (H)     Medication refill- do not delete      Pain medication agreement signed - Shay Kirkpatrick 2/7/14     Hepatocellular carcinoma (H)     Hepatic cirrhosis, unspecified hepatic cirrhosis type (H)     Preoperative cardiovascular examination     HCC (hepatocellular carcinoma) (H)     Edema     Cellulitis of scrotum     Uncontrolled type 2 diabetes mellitus with  hyperglycemia, with long-term current use of insulin (H)     Debility     Type 2 diabetes mellitus without complication, with long-term current use of insulin (H)     Altered mental status, unspecified     Hepatic encephalopathy (H)     Urinary tract infection associated with catheterization of urinary tract (H)     Altered mental status     Hypoglycemia     Hepatic cirrhosis (H)     Osteoarthritis     Rheumatoid arthritis (H)     Hyperkalemia     Liver transplant recipient (H)     Acute kidney injury (H)     Immunosuppressed status (H)     Neck pain     Back pain     SOCIAL /FAMILY HISTORY: [x]                  No recent change    Past Medical History:   Diagnosis Date     Acute venous embolism and thrombosis of other specified veins 11/01    Deep vein thrombophlebitis, filter placed     Cancer (H)      Depressive disorder, not elsewhere classified      Esophageal reflux      Fibromyalgia 1/2009    dx with Dr Benitez( Rhe[facility])     History of thrombophlebitis      Osteoarthritis      Other and unspecified hyperlipidemia      Other chronic nonalcoholic liver disease     Fatty liver      Other testicular hypofunction      Pneumonia, organism unspecified 10-01    Included ARDS, sepsis, and  acute renal failure; hospitalized     Rheumatoid arthritis(714.0)      Type II or unspecified type diabetes mellitus without mention of complication, not stated as uncontrolled 11/01    Managed by endocrinology     Unspecified essential hypertension 11/01    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     Unspecified sleep apnea     Uses BiPAP     Past Surgical History:   Procedure Laterality Date     BENCH LIVER N/A 3/4/2017    Procedure: BENCH LIVER;  Surgeon: Jovan Tran MD;  Location: [facility] OR     C NONSPECIFIC PROCEDURE      tracheostomy     C NONSPECIFIC PROCEDURE      repair of deviated septum     C NONSPECIFIC PROCEDURE  2007    Rt knee arthroscopy     C TOTAL KNEE ARTHROPLASTY  2008    Right knee arthroscopy      CHOLECYSTECTOMY       ESOPHAGOSCOPY, GASTROSCOPY, DUODENOSCOPY (EGD), COMBINED N/A 2016    Procedure: COMBINED ESOPHAGOSCOPY, GASTROSCOPY, DUODENOSCOPY (EGD), BIOPSY SINGLE OR MULTIPLE;  Surgeon: Trent Pederson MD;  Location:  GI     TRANSPLANT LIVER RECIPIENT  DONOR N/A 3/4/2017    Procedure: TRANSPLANT LIVER RECIPIENT  DONOR;  Surgeon: Jovan Tran MD;  Location:  OR     Social History     Social History     Marital status:      Spouse name: N/A     Number of children: 1     Years of education: N/A     Occupational History            Social History Main Topics     Smoking status: Former Smoker     Smokeless tobacco: Former User     Types: Chew     Quit date: 10/8/2015      Comment: Has used chewing tobacco since age 15 , chewed for 20yrs      Alcohol use No      Comment: last drink about a year ago (quit )     Drug use: No     Sexual activity: Yes     Partners: Female     Other Topics Concern     Not on file     Social History Narrative    uSED TO BE      Back in school now         Prescription Medications as of 2017             clotrimazole (MYCELEX) 10 MG LOZG lozenge Place 1 lozenge (1 Beatrice) inside cheek 4 times daily    Linagliptin (TRADJENTA PO) Take 5 mg by mouth    tadalafil (CIALIS) 5 MG tablet Take 1 tablet (5 mg) by mouth daily Never use with nitroglycerin, terazosin or doxazosin.    tadalafil (CIALIS) 5 MG tablet Take 1 tablet (5 mg) by mouth daily Never use with nitroglycerin, terazosin or doxazosin.    cyclobenzaprine (FLEXERIL) 10 MG tablet Take 1 tablet (10 mg) by mouth 3 times daily as needed for muscle spasms    omeprazole (PRILOSEC) 20 MG CR capsule Take 1 capsule (20 mg) by mouth 2 times daily (before meals)    tacrolimus (PROGRAF - GENERIC EQUIVALENT) 1 MG capsule Take 6 capsules (6 mg) by mouth every 12 hours    mycophenolate (CELLCEPT - GENERIC EQUIVALENT) 250 MG capsule Take 4 capsules (1,000 mg)  by mouth 2 times daily    furosemide (LASIX) 20 MG tablet Take 2 tablets (40 mg) by mouth daily    magnesium oxide (MAG-OX) 400 MG tablet Take 1 tablet (400 mg) by mouth 2 times daily    megestrol (MEGACE) 40 MG/ML suspension Take 10 mLs (400 mg) by mouth 2 times daily    lidocaine (LIDODERM) 5 % Patch Place 1 patch onto the skin every 24 hours    LACTOBACILLUS EXTRA STRENGTH CAPS Take 1 capsule by mouth 2 times daily    prochlorperazine (COMPAZINE) 5 MG tablet Take 1-2 tablets (5-10 mg) by mouth every 6 hours as needed for nausea or vomiting    gabapentin (NEURONTIN) 300 MG capsule Take 1 capsule (300 mg) by mouth 3 times daily    oxyCODONE (ROXICODONE) 5 MG IR tablet Take 1-2 tablets (5-10 mg) by mouth every 4 hours as needed for moderate to severe pain    aspirin 325 MG tablet 1 tablet (325 mg) by Oral or Feeding Tube route daily    insulin aspart (NOVOLOG PEN) 100 UNIT/ML injection DOSE:  1 units per 8 grams of carbohydrate. With meals and snacks. Only chart total amount of units given.  Do not give if pre-prandial glucose is less than 60 mg/dL.    insulin glargine (LANTUS) 100 UNIT/ML injection Inject 45 Units Subcutaneous every 24 hours    valGANciclovir (VALCYTE) 450 MG tablet Take 1 tablet (450 mg) by mouth daily    senna-docusate (SENOKOT-S;PERICOLACE) 8.6-50 MG per tablet Take 2 tablets by mouth 2 times daily    sulfamethoxazole-trimethoprim (BACTRIM/SEPTRA) 400-80 MG per tablet Take 1 tablet by mouth daily    multivitamin, therapeutic with minerals (THERA-VIT-M) TABS tablet Take 1 tablet by mouth daily    insulin aspart (NOVOLOG PEN) 100 UNIT/ML injection Inject 1-10 Units Subcutaneous 3 times daily (before meals) For Pre-Meal  - 189 give 1 unit.   For Pre-Meal  - 239 give 2 units.   For Pre-Meal  - 289 give 3 units.   For Pre-Meal  - 339 give 4 units.   For Pre-Meal -399 give 5 units.  For Pre-Meal -449 give 6 units.  For Pre-Meal BG = or > 450 give 7 units.    insulin  aspart (NOVOLOG PEN) 100 UNIT/ML injection Inject 1-7 Units Subcutaneous At Bedtime Bedtime Blood Glucose (BG)  For  - 249 give 1 units.   For  - 299 give 2 units.   For  - 349 give 3 units.  For - 399 give 4 units.   For BG = or > 400 give 5 units.    ondansetron (ZOFRAN-ODT) 4 MG ODT tab Take 1 tablet (4 mg) by mouth every 8 hours as needed for nausea    glucagon 1 MG SOLR injection Inject 1 mg Subcutaneous every 15 minutes as needed for low blood sugar (May repeat x 1 only)        Erythromycin and Vioxx   REVIEW OF SYSTEMS (check box if normal)  [x]               GENERAL  [x]                 PULMONARY [x]                GENITOURINARY  [x]                CNS                 [x]                 CARDIAC  [x]                 ENDOCRINE  [x]                EARS,NOSE,THROAT [x]                 GASTROINTESTINAL [x]                 NEUROLOGIC    [x]                MUSCLOSKELTAL  [x]                  HEMATOLOGY      PHYSICAL EXAM (check box if normal)BP (!) 169/94 (BP Location: Right arm, Patient Position: Chair, Cuff Size: Adult Large)  Pulse 95  Temp 98.7  F (37.1  C) (Oral)  Resp 16  SpO2 100%        [x]            GENERAL:    [x]       EYES:  ICTERIC   []        YES  []                    NO  [x]           EXTREMITIES: Clubbing []       Y     [x]           N    [x]           EARS, NOSE, THROAT: Membranes Moist    YES   [x]                   NO []                  [x]           LUNGS:  CLEAR    YES       [x]                  NO    []                                [x]           SKIN: Jaundice           YES       []                  NO    [x]                   Rash: YES       []                  NO    [x]                                     [x]             HEART: Regular Rate          YES       [x]                  NO    []                   Incision Clean:  YES       [x]                  NO    []                                [x]                    ABDOMEN: Wound healthy Organomegaly YES        []                  NO    [x]                       [x]                    NEUROLOGICAL:  Nonfocal  YES       [x]                  NO    []                       [x]                    Hernia YES       []                  NO    [x]                   PSYCHIATRIC:  Appropriate  YES       [x]                  NO    []                       OTHER:                                                                                                   PAIN SCALE:: 3    Again, thank you for allowing me to participate in the care of your patient.      Sincerely,    Jovan Tran MD

## 2017-05-09 ENCOUNTER — TELEPHONE (OUTPATIENT)
Dept: PHARMACY | Facility: CLINIC | Age: 52
End: 2017-05-09

## 2017-05-11 ENCOUNTER — OFFICE VISIT (OUTPATIENT)
Dept: INTERNAL MEDICINE | Facility: CLINIC | Age: 52
End: 2017-05-11

## 2017-05-11 VITALS
HEART RATE: 89 BPM | OXYGEN SATURATION: 100 % | RESPIRATION RATE: 20 BRPM | WEIGHT: 224.3 LBS | BODY MASS INDEX: 30.42 KG/M2 | SYSTOLIC BLOOD PRESSURE: 164 MMHG | DIASTOLIC BLOOD PRESSURE: 96 MMHG

## 2017-05-11 DIAGNOSIS — J06.9 VIRAL URI: Primary | ICD-10-CM

## 2017-05-11 DIAGNOSIS — E11.42 TYPE 2 DIABETES MELLITUS WITH DIABETIC POLYNEUROPATHY, UNSPECIFIED LONG TERM INSULIN USE STATUS: ICD-10-CM

## 2017-05-11 DIAGNOSIS — Z94.4 LIVER TRANSPLANT RECIPIENT (H): ICD-10-CM

## 2017-05-11 ASSESSMENT — PAIN SCALES - GENERAL: PAINLEVEL: NO PAIN (0)

## 2017-05-11 NOTE — MR AVS SNAPSHOT
After Visit Summary   5/11/2017    Camahco Bhagat    MRN: [id number]           Patient Information     Date Of Birth          1965        Visit Information        Provider Department      5/11/2017 3:00 PM Shay Kirkpatrick MD [facility]        Today's Diagnoses     Screening for diabetic retinopathy    -  1      Care Instructions    Primary Care Center Medication Refill Request Information:  * Please contact your pharmacy regarding ANY request for medication refills.  ** [facility] Prescription Fax = [phone]  * Please allow 3 business days for routine medication refills.  * Please allow 5 business days for controlled substance medication refills.     Primary Care Center Test Result notification information:  *You will be notified with in 7-10 days of your appointment day regarding the results of your test.  If you are on MyChart you will be notified as soon as the provider has reviewed the results and signed off on them.    [facility] [phone]         Follow-ups after your visit        Your next 10 appointments already scheduled     Jun 07, 2017 12:45 PM CDT   (Arrive by 12:30 PM)   Return General Liver with Toñito Camejo MD   [facility] ([facility])    [address] [place] [address] [phone]            Jun 12, 2017 11:30 AM CDT   (Arrive by 11:15 AM)   Liver Return Post Op with Jovan Tran MD   [facility] Solid Organ Transplant ([facility])    [address] [place] [id number] [phone]              Who to contact     Please call your clinic at [phone] to:    Ask questions about your health    Make or cancel appointments    Discuss your medicines    Learn about your test results    Speak to your doctor   If you have compliments or concerns about an experience at your clinic, or if you wish to file a complaint, please contact [facility]  [facility] Patient Relations at [phone] or email us at [email or web address]         Additional Information About Your Visit        [email or web address] Information     [email or web address] gives you secure access to your electronic health record. If you see a primary care provider, you can also send messages to your care team and make appointments. If you have questions, please call your primary care clinic.  If you do not have a primary care provider, please call [phone] and they will assist you.      [employer] is an electronic gateway that provides easy, online access to your medical records. With [employer], you can request a clinic appointment, read your test results, renew a prescription or communicate with your care team.     To access your existing account, please contact your [facility] or call [phone] for assistance.        Care EveryWhere ID     This is your Care EveryWhere ID. This could be used by other organizations to access your [facility] medical records  [id number]        Your Vitals Were     Pulse Respirations Pulse Oximetry BMI (Body Mass Index)          89 20 100% 30.42 kg/m2         Blood Pressure from Last 3 Encounters:   05/11/17 (!) 164/96   05/08/17 (!) 169/94   05/04/17 142/64    Weight from Last 3 Encounters:   05/11/17 101.7 kg (224 lb 4.8 oz)   05/04/17 105.7 kg (233 lb)   04/24/17 106.1 kg (233 lb 14.4 oz)              Today, you had the following     No orders found for display       Primary Care Provider Office Phone # Fax #    Shay Kirkpatrick [phone]        [facility] [address] [place] [address] [phone]  [facility] [address]        Thank you!     Thank you for choosing [facility]  for your care. Our goal is always to provide you with excellent care. Hearing back from our patients is one way we can continue to improve our services. Please take a few minutes to complete the written survey that you may receive in the mail  after your visit with us. Thank you!             Your Updated Medication List - Protect others around you: Learn how to safely use, store and throw away your medicines at www.disposemymeds.org.          This list is accurate as of: 5/11/17  3:21 PM.  Always use your most recent med list.                   Brand Name Dispense Instructions for use    aspirin 325 MG tablet     180 tablet    1 tablet (325 mg) by Oral or Feeding Tube route daily       clotrimazole 10 MG Lozg lozenge    MYCELEX    120 each    Place 1 lozenge (1 Beatrice) inside cheek 4 times daily       cyclobenzaprine 10 MG tablet    FLEXERIL    90 tablet    Take 1 tablet (10 mg) by mouth 3 times daily as needed for muscle spasms       gabapentin 300 MG capsule    NEURONTIN    90 capsule    Take 1 capsule (300 mg) by mouth 3 times daily       glucagon 1 MG Solr injection     1 each    Inject 1 mg Subcutaneous every 15 minutes as needed for low blood sugar (May repeat x 1 only)       * insulin aspart 100 UNIT/ML injection    NovoLOG PEN    3 mL    DOSE:  1 units per 8 grams of carbohydrate. With meals and snacks. Only chart total amount of units given.  Do not give if pre-prandial glucose is less than 60 mg/dL.       * insulin aspart 100 UNIT/ML injection    NovoLOG PEN    3 mL    Inject 1-10 Units Subcutaneous 3 times daily (before meals) For Pre-Meal  - 189 give 1 unit.  For Pre-Meal  - 239 give 2 units.  For Pre-Meal  - 289 give 3 units.  For Pre-Meal  - 339 give 4 units.  For Pre-Meal -399 give 5 units. For Pre-Meal -449 give 6 units. For Pre-Meal BG = or > 450 give 7 units.       * insulin aspart 100 UNIT/ML injection    NovoLOG PEN    3 mL    Inject 1-7 Units Subcutaneous At Bedtime Bedtime Blood Glucose (BG) For  - 249 give 1 units.  For  - 299 give 2 units.  For  - 349 give 3 units. For - 399 give 4 units.  For BG = or > 400 give 5 units.       insulin glargine 100 UNIT/ML injection     LANTUS    3 mL    Inject 45 Units Subcutaneous every 24 hours       LACTOBACILLUS EXTRA STRENGTH Caps     30 capsule    Take 1 capsule by mouth 2 times daily       lidocaine 5 % Patch    LIDODERM    30 patch    Place 1 patch onto the skin every 24 hours       magnesium oxide 400 MG tablet    MAG-OX    7 tablet    Take 1 tablet (400 mg) by mouth 2 times daily       multivitamin, therapeutic with minerals Tabs tablet     30 each    Take 1 tablet by mouth daily       mycophenolate 250 MG capsule    CELLCEPT - GENERIC EQUIVALENT    240 capsule    Take 4 capsules (1,000 mg) by mouth 2 times daily       omeprazole 20 MG CR capsule    priLOSEC    60 capsule    Take 1 capsule (20 mg) by mouth 2 times daily (before meals)       ondansetron 4 MG ODT tab    ZOFRAN-ODT    30 tablet    Take 1 tablet (4 mg) by mouth every 8 hours as needed for nausea       oxyCODONE 5 MG IR tablet    ROXICODONE    40 tablet    Take 1-2 tablets (5-10 mg) by mouth every 4 hours as needed for moderate to severe pain       prochlorperazine 5 MG tablet    COMPAZINE    90 tablet    Take 1-2 tablets (5-10 mg) by mouth every 6 hours as needed for nausea or vomiting       senna-docusate 8.6-50 MG per tablet    SENOKOT-S;PERICOLACE    100 tablet    Take 2 tablets by mouth 2 times daily       sulfamethoxazole-trimethoprim 400-80 MG per tablet    BACTRIM/SEPTRA    30 tablet    Take 1 tablet by mouth daily       tacrolimus 1 MG capsule    PROGRAF - GENERIC EQUIVALENT    360 capsule    Take 6 capsules (6 mg) by mouth every 12 hours       * tadalafil 5 MG tablet    CIALIS    30 tablet    Take 1 tablet (5 mg) by mouth daily Never use with nitroglycerin, terazosin or doxazosin.       * tadalafil 5 MG tablet    CIALIS    30 tablet    Take 1 tablet (5 mg) by mouth daily Never use with nitroglycerin, terazosin or doxazosin.       TRADJENTA PO      Take 5 mg by mouth       valGANciclovir 450 MG tablet    VALCYTE    60 tablet    Take 1 tablet (450 mg) by mouth daily        * Notice:  This list has 5 medication(s) that are the same as other medications prescribed for you. Read the directions carefully, and ask your doctor or other care provider to review them with you.

## 2017-05-11 NOTE — PROGRESS NOTES
"ASSESSMENT/PLAN:    Diabetes - monitored outside of this clinic system, up to date on labs, well control    Cold - likely passing viral URI, no current symptoms, symptomatic care only.  Warned him of symptoms to watch for that would require him to return or call - temp 101 or more, shortness of breath, vomiting or diarrhea    Liver transplant - he feels 10-15 years younger and is doing very well, monitored by Dr. Camejo    Osteoarthritis - he can call in the future (up to 1 year from now) for refills of oxycodone and/or flexeril    Total time spent 25 minutes.  More than 50% of the time spent with Mr. Bhagat on counseling / coordinating his care    Shay Kirkpatrick MD, FACP      Chief complaint:  Camacho Bhagat is a 51 year old male presents for   Chief Complaint   Patient presents with     Hospital F/U     follow up [facility] transplant-3/4/17        SUBJECTIVE:  He had a  liver transplant in early March.  He said he recovered very quickly after the surgery and is doing very well.  Leading up to this he was having a lot of hepatic encephalopathy.  He is tolerating the medications right now.    He has diabetes.  He was prescribed tradjenta 5mg pill by his endocrinology (Danisha) and has had great glucoses.  He is getting eye visits externally.    He has a cold.  Yesterday he \"felt like dying\" - fever, sore throat, head congestion.  Today he is feeling a lot better.  He has not had an elevated temperature today.  He has been doing [employer] Cold Medicine.      He will have muscle spasms at various spots in the body but especially in the hands.  He takes flexeril for this.  He has osteoarthritis for which he takes 30 pills of oxycodone over the course of 50 days.    He has neuropathy in the left foot for which he uses gabapentin.    Medications and allergies were reviewed by me today.     Review Of Systems  Constitutional: he has lost about 75lbs after the transplant!  Psych: no signs of depression    Patient " Active Problem List    Diagnosis Date Noted     Neck pain 05/03/2017     Priority: Medium     Back pain 05/03/2017     Priority: Medium     Acute kidney injury (H) 03/10/2017     Priority: Medium     Immunosuppressed status (H) 03/10/2017     Priority: Medium     Liver transplant recipient (H) 03/04/2017     Priority: Medium     Hyperkalemia 02/14/2017     Priority: Medium     Uncontrolled type 2 diabetes mellitus with hyperglycemia, with long-term current use of insulin (H) 11/10/2016     Priority: Medium     Edema 10/26/2016     Priority: Medium     Cellulitis of scrotum 10/26/2016     Priority: Medium     Hepatic cirrhosis, unspecified hepatic cirrhosis type (H) 02/24/2016     Priority: Medium     Hepatocellular carcinoma (H) 01/27/2016     Priority: Medium     Hepatic cirrhosis (H) 01/18/2015     Priority: Medium     Osteoarthritis 01/18/2015     Priority: Medium     Rheumatoid arthritis (H) 01/18/2015     Priority: Medium     Pain medication agreement signed - Shay Sick 2/7/14 02/07/2014     Priority: Medium     Medication refill- do not delete  11/13/2013     Priority: Medium     Diabetes mellitus with neurological manifestation (H) 09/07/2012     Priority: Medium     Knee pain 09/16/2011     Priority: Medium     Primary localized osteoarthrosis, lower leg 09/16/2011     Priority: Medium     Fibromyalgia 08/15/2011     Priority: Medium     OA (osteoarthritis) 08/15/2011     Priority: Medium     Sicca syndrome (H) 08/15/2011     Priority: Medium     HYPERLIPIDEMIA LDL GOAL <100 10/31/2010     Priority: Medium     Testicular hypofunction      Priority: Medium     Problem list name updated by automated process. Provider to review       Sleep apnea      Priority: Medium     Uses BiPAP  Problem list name updated by automated process. Provider to review       Hyperlipidemia 10/10/2005     Priority: Medium     Problem list name updated by automated process. Provider to review       Depressive disorder, not elsewhere  classified 05/29/2003     Priority: Medium     Esophageal reflux 10/08/2002     Priority: Medium     Carpal tunnel syndrome 07/08/2002     Priority: Medium     Essential hypertension 07/08/2002     Priority: Medium     Problem list name updated by automated process. Provider to review       Personal history of thrombophlebitis 07/08/2002     Priority: Medium       PHYSICAL EXAM:  BP (!) 164/96 (BP Location: Right arm, Patient Position: Right side, Cuff Size: Adult Large)  Pulse 89  Resp 20  Wt 101.7 kg (224 lb 4.8 oz)  SpO2 100%  BMI 30.42 kg/m2  Constitutional: no distress, comfortable, pleasant   Ears, Nose and Throat: tympanic membranes clear, nose clear, throat clear  Cardiovascular: regular rate and rhythm, normal S1 and S2, no murmurs  Respiratory: clear to auscultation, no wheezes or crackles, normal breath sounds

## 2017-05-11 NOTE — PATIENT INSTRUCTIONS
[facility] Medication Refill Request Information:  * Please contact your pharmacy regarding ANY request for medication refills.  ** [facility] Prescription Fax = [phone]  * Please allow 3 business days for routine medication refills.  * Please allow 5 business days for controlled substance medication refills.     [facility] Test Result notification information:  *You will be notified with in 7-10 days of your appointment day regarding the results of your test.  If you are on MyChart you will be notified as soon as the provider has reviewed the results and signed off on them.    [facility] [phone]

## 2017-05-24 DIAGNOSIS — K21.9 GASTROESOPHAGEAL REFLUX DISEASE WITHOUT ESOPHAGITIS: ICD-10-CM

## 2017-05-24 DIAGNOSIS — Z79.60 LONG-TERM USE OF IMMUNOSUPPRESSANT MEDICATION: ICD-10-CM

## 2017-05-24 DIAGNOSIS — Z94.4 HISTORY OF LIVER TRANSPLANT (H): ICD-10-CM

## 2017-05-30 ENCOUNTER — TELEPHONE (OUTPATIENT)
Dept: TRANSPLANT | Facility: CLINIC | Age: 52
End: 2017-05-30

## 2017-05-30 NOTE — TELEPHONE ENCOUNTER
Camacho asking what he can take for a cold, has had a cold.  No fever, no cough.   Instructed Camacho that he can take over the counter medication, such as claritin, zytrec, etc.  Camacho also reports episodes of diarrhea, with no fever, or abd cramping, etc.   He takes immodium and it goes away.   He is at 3months post transplant.  Plan made to decrease cellcept dose to 750mg Q 12 hours, from current dose of 1000mg Q 12 hours.  Will plan to review other medication changes at his appt with  next week.

## 2017-05-31 DIAGNOSIS — Z94.4 LIVER TRANSPLANT RECIPIENT (H): ICD-10-CM

## 2017-06-01 RX ORDER — MYCOPHENOLATE MOFETIL 250 MG/1
750 CAPSULE ORAL EVERY 12 HOURS
Qty: 90 CAPSULE | Refills: 3 | Status: SHIPPED | OUTPATIENT
Start: 2017-06-01 | End: 2017-06-15

## 2017-06-06 ENCOUNTER — TELEPHONE (OUTPATIENT)
Dept: TRANSPLANT | Facility: CLINIC | Age: 52
End: 2017-06-06

## 2017-06-06 ENCOUNTER — HOSPITAL ENCOUNTER (OUTPATIENT)
Dept: LAB | Facility: CLINIC | Age: 52
End: 2017-06-06
Attending: TRANSPLANT SURGERY
Payer: COMMERCIAL

## 2017-06-06 ENCOUNTER — HOSPITAL ENCOUNTER (EMERGENCY)
Facility: CLINIC | Age: 52
Discharge: HOME | End: 2017-06-06
Attending: EMERGENCY MEDICINE | Admitting: EMERGENCY MEDICINE
Payer: COMMERCIAL

## 2017-06-06 VITALS
SYSTOLIC BLOOD PRESSURE: 185 MMHG | DIASTOLIC BLOOD PRESSURE: 115 MMHG | HEART RATE: 85 BPM | OXYGEN SATURATION: 100 % | RESPIRATION RATE: 18 BRPM | TEMPERATURE: 98.1 F

## 2017-06-06 DIAGNOSIS — Z94.4 LIVER REPLACED BY TRANSPLANT (H): ICD-10-CM

## 2017-06-06 DIAGNOSIS — Z94.4 STATUS POST LIVER TRANSPLANTATION (H): ICD-10-CM

## 2017-06-06 DIAGNOSIS — N28.9 RENAL INSUFFICIENCY: ICD-10-CM

## 2017-06-06 DIAGNOSIS — E86.0 DEHYDRATION: ICD-10-CM

## 2017-06-06 DIAGNOSIS — R03.0 ELEVATED BLOOD PRESSURE READING WITHOUT DIAGNOSIS OF HYPERTENSION: ICD-10-CM

## 2017-06-06 DIAGNOSIS — E87.5 HYPERKALEMIA: ICD-10-CM

## 2017-06-06 LAB
ALBUMIN SERPL-MCNC: 3.9 G/DL (ref 3.4–5)
ALBUMIN SERPL-MCNC: 3.9 G/DL (ref 3.4–5)
ALBUMIN UR-MCNC: 100 MG/DL
ALP SERPL-CCNC: 84 U/L (ref 40–150)
ALP SERPL-CCNC: 84 U/L (ref 40–150)
ALT SERPL W P-5'-P-CCNC: 16 U/L (ref 0–70)
ALT SERPL W P-5'-P-CCNC: 16 U/L (ref 0–70)
ANION GAP SERPL CALCULATED.3IONS-SCNC: 12 MMOL/L (ref 6–17)
ANION GAP SERPL CALCULATED.3IONS-SCNC: 13 MMOL/L (ref 6–17)
ANION GAP SERPL CALCULATED.3IONS-SCNC: 5 MMOL/L (ref 3–14)
ANION GAP SERPL CALCULATED.3IONS-SCNC: 7 MMOL/L (ref 3–14)
APPEARANCE UR: ABNORMAL
AST SERPL W P-5'-P-CCNC: 17 U/L (ref 0–45)
AST SERPL W P-5'-P-CCNC: 20 U/L (ref 0–45)
BASOPHILS # BLD AUTO: 0.1 10E9/L (ref 0–0.2)
BASOPHILS NFR BLD AUTO: 2 %
BILIRUB DIRECT SERPL-MCNC: 0.2 MG/DL (ref 0–0.2)
BILIRUB SERPL-MCNC: 0.3 MG/DL (ref 0.2–1.3)
BILIRUB SERPL-MCNC: 0.4 MG/DL (ref 0.2–1.3)
BILIRUB UR QL STRIP: NEGATIVE
BUN SERPL-MCNC: 44 MG/DL (ref 7–30)
BUN SERPL-MCNC: 49 MG/DL (ref 7–30)
BUN SERPL-MCNC: 53 MG/DL (ref 7–30)
BUN SERPL-MCNC: 54 MG/DL (ref 7–30)
CA-I BLD-SCNC: 4.9 MG/DL (ref 4.4–5.2)
CA-I BLD-SCNC: 5 MG/DL (ref 4.4–5.2)
CALCIUM SERPL-MCNC: 8.7 MG/DL (ref 8.5–10.1)
CALCIUM SERPL-MCNC: 8.8 MG/DL (ref 8.5–10.1)
CHLORIDE BLD-SCNC: 110 MMOL/L (ref 94–109)
CHLORIDE BLD-SCNC: 113 MMOL/L (ref 94–109)
CHLORIDE SERPL-SCNC: 113 MMOL/L (ref 94–109)
CHLORIDE SERPL-SCNC: 113 MMOL/L (ref 94–109)
CO2 BLD-SCNC: 16 MMOL/L (ref 20–32)
CO2 BLD-SCNC: 18 MMOL/L (ref 20–32)
CO2 SERPL-SCNC: 19 MMOL/L (ref 20–32)
CO2 SERPL-SCNC: 21 MMOL/L (ref 20–32)
COLOR UR AUTO: YELLOW
CREAT BLD-MCNC: 1.8 MG/DL (ref 0.66–1.25)
CREAT BLD-MCNC: 2.1 MG/DL (ref 0.66–1.25)
CREAT SERPL-MCNC: 1.97 MG/DL (ref 0.66–1.25)
CREAT SERPL-MCNC: 2.01 MG/DL (ref 0.66–1.25)
DIFFERENTIAL METHOD BLD: ABNORMAL
EOSINOPHIL # BLD AUTO: 0.2 10E9/L (ref 0–0.7)
EOSINOPHIL NFR BLD AUTO: 5 %
ERYTHROCYTE [DISTWIDTH] IN BLOOD BY AUTOMATED COUNT: 15.7 % (ref 10–15)
ERYTHROCYTE [DISTWIDTH] IN BLOOD BY AUTOMATED COUNT: 15.7 % (ref 10–15)
GFR SERPL CREATININE-BSD FRML MDRD: 33 ML/MIN/1.7M2
GFR SERPL CREATININE-BSD FRML MDRD: 35 ML/MIN/1.7M2
GFR SERPL CREATININE-BSD FRML MDRD: 36 ML/MIN/1.7M2
GFR SERPL CREATININE-BSD FRML MDRD: 40 ML/MIN/1.7M2
GLUCOSE BLD-MCNC: 175 MG/DL (ref 70–99)
GLUCOSE BLD-MCNC: 247 MG/DL (ref 70–99)
GLUCOSE SERPL-MCNC: 253 MG/DL (ref 70–99)
GLUCOSE SERPL-MCNC: 283 MG/DL (ref 70–99)
GLUCOSE UR STRIP-MCNC: 50 MG/DL
HCT VFR BLD AUTO: 27.5 % (ref 40–53)
HCT VFR BLD AUTO: 28.7 % (ref 40–53)
HCT VFR BLD CALC: 25 %PCV (ref 40–53)
HCT VFR BLD CALC: 27 %PCV (ref 40–53)
HGB BLD CALC-MCNC: 8.5 G/DL (ref 13.3–17.7)
HGB BLD CALC-MCNC: 9.2 G/DL (ref 13.3–17.7)
HGB BLD-MCNC: 9.4 G/DL (ref 13.3–17.7)
HGB BLD-MCNC: 9.6 G/DL (ref 13.3–17.7)
HGB UR QL STRIP: NEGATIVE
HYALINE CASTS #/AREA URNS LPF: 20 /LPF (ref 0–2)
KETONES UR STRIP-MCNC: NEGATIVE MG/DL
LEUKOCYTE ESTERASE UR QL STRIP: NEGATIVE
LYMPHOCYTES # BLD AUTO: 0.2 10E9/L (ref 0.8–5.3)
LYMPHOCYTES NFR BLD AUTO: 7 %
MAGNESIUM SERPL-MCNC: 2 MG/DL (ref 1.6–2.3)
MCH RBC QN AUTO: 29.7 PG (ref 26.5–33)
MCH RBC QN AUTO: 30.1 PG (ref 26.5–33)
MCHC RBC AUTO-ENTMCNC: 33.4 G/DL (ref 31.5–36.5)
MCHC RBC AUTO-ENTMCNC: 34.2 G/DL (ref 31.5–36.5)
MCV RBC AUTO: 88 FL (ref 78–100)
MCV RBC AUTO: 89 FL (ref 78–100)
MONOCYTES # BLD AUTO: 0.1 10E9/L (ref 0–1.3)
MONOCYTES NFR BLD AUTO: 4 %
MUCOUS THREADS #/AREA URNS LPF: PRESENT /LPF
NEUTROPHILS # BLD AUTO: 2.5 10E9/L (ref 1.6–8.3)
NEUTROPHILS NFR BLD AUTO: 82 %
NITRATE UR QL: NEGATIVE
PH UR STRIP: 5 PH (ref 5–7)
PHOSPHATE SERPL-MCNC: 4.1 MG/DL (ref 2.5–4.5)
PLATELET # BLD AUTO: 105 10E9/L (ref 150–450)
PLATELET # BLD AUTO: 110 10E9/L (ref 150–450)
PLATELET # BLD EST: ABNORMAL 10*3/UL
POTASSIUM BLD-SCNC: 5.3 MMOL/L (ref 3.4–5.3)
POTASSIUM BLD-SCNC: 6.1 MMOL/L (ref 3.4–5.3)
POTASSIUM SERPL-SCNC: 6 MMOL/L (ref 3.4–5.3)
POTASSIUM SERPL-SCNC: 7 MMOL/L (ref 3.4–5.3)
PROT SERPL-MCNC: 6.4 G/DL (ref 6.8–8.8)
PROT SERPL-MCNC: 6.5 G/DL (ref 6.8–8.8)
RBC # BLD AUTO: 3.12 10E12/L (ref 4.4–5.9)
RBC # BLD AUTO: 3.23 10E12/L (ref 4.4–5.9)
RBC #/AREA URNS AUTO: 2 /HPF (ref 0–2)
RBC MORPH BLD: ABNORMAL
SODIUM BLD-SCNC: 140 MMOL/L (ref 133–144)
SODIUM BLD-SCNC: 142 MMOL/L (ref 133–144)
SODIUM SERPL-SCNC: 139 MMOL/L (ref 133–144)
SODIUM SERPL-SCNC: 139 MMOL/L (ref 133–144)
SP GR UR STRIP: 1.01 (ref 1–1.03)
TRANS CELLS #/AREA URNS HPF: <1 /HPF (ref 0–1)
URN SPEC COLLECT METH UR: ABNORMAL
UROBILINOGEN UR STRIP-MCNC: 0 MG/DL (ref 0–2)
WBC # BLD AUTO: 2.7 10E9/L (ref 4–11)
WBC # BLD AUTO: 3 10E9/L (ref 4–11)
WBC #/AREA URNS AUTO: 1 /HPF (ref 0–2)

## 2017-06-06 PROCEDURE — 25000128 H RX IP 250 OP 636: Performed by: EMERGENCY MEDICINE

## 2017-06-06 PROCEDURE — 85014 HEMATOCRIT: CPT

## 2017-06-06 PROCEDURE — 96361 HYDRATE IV INFUSION ADD-ON: CPT

## 2017-06-06 PROCEDURE — 81001 URINALYSIS AUTO W/SCOPE: CPT | Performed by: EMERGENCY MEDICINE

## 2017-06-06 PROCEDURE — 80053 COMPREHEN METABOLIC PANEL: CPT | Performed by: EMERGENCY MEDICINE

## 2017-06-06 PROCEDURE — 99284 EMERGENCY DEPT VISIT MOD MDM: CPT | Mod: 25

## 2017-06-06 PROCEDURE — 85025 COMPLETE CBC W/AUTO DIFF WBC: CPT | Performed by: EMERGENCY MEDICINE

## 2017-06-06 PROCEDURE — 96360 HYDRATION IV INFUSION INIT: CPT

## 2017-06-06 PROCEDURE — 80047 BASIC METABLC PNL IONIZED CA: CPT | Mod: 91

## 2017-06-06 PROCEDURE — 93005 ELECTROCARDIOGRAM TRACING: CPT

## 2017-06-06 RX ORDER — LIDOCAINE 40 MG/G
CREAM TOPICAL
Status: DISCONTINUED | OUTPATIENT
Start: 2017-06-06 | End: 2017-06-06 | Stop reason: HOSPADM

## 2017-06-06 RX ADMIN — SODIUM CHLORIDE 500 ML: 9 INJECTION, SOLUTION INTRAVENOUS at 16:38

## 2017-06-06 RX ADMIN — SODIUM CHLORIDE 1000 ML: 9 INJECTION, SOLUTION INTRAVENOUS at 17:59

## 2017-06-06 ASSESSMENT — ENCOUNTER SYMPTOMS
FEVER: 0
SHORTNESS OF BREATH: 0

## 2017-06-06 NOTE — ED NOTES
Writer went to administer more IVF and explained that MD would be in shortly to speak with him and that she had been in contact with his liver doctor who instructed another bolus of NS. Pt verbalized understanding and denied any other needs at this time.

## 2017-06-06 NOTE — ED AVS SNAPSHOT
[facility] Emergency Department    [address]    [facility] [address]    Phone:  [phone]    Fax:  [phone]                                       Camacho Bhagat   MRN: [id number]    Department:  [facility] Emergency Department   Date of Visit:  6/6/2017           After Visit Summary Signature Page     I have received my discharge instructions, and my questions have been answered. I have discussed any challenges I see with this plan with the nurse or doctor.    ..........................................................................................................................................  Patient/Patient Representative Signature      ..........................................................................................................................................  Patient Representative Print Name and Relationship to Patient    ..................................................               ................................................  Date                                            Time    ..........................................................................................................................................  Reviewed by Signature/Title    ...................................................              ..............................................  Date                                                            Time

## 2017-06-06 NOTE — ED PROVIDER NOTES
History     Chief Complaint:  Abnormal labs    HPI   Camacho Bhagat is a 51 year old male with a history diabetes who is status post liver transplant March 4th who presents with abnormal labs. The patient had routine blood work today in preparation for an appointment tomorrow and was found to have a high potassium level. The patient is feeling normally. He states that it is not abnormal for him to have high blood pressure when he is in the emergency department. He is urinating normally.  No fever. No leg swelling or shortness of breath. No recent changes to his medications. He has been eating normally although he does admit to drinking some Gatorade yesterday which he is supposed to avoid. No history of kidney disease except immediately after surgery. He has had high potassium in the past. The patient is getting over recent cold symptoms.    Laboratory (6/6):  Hepatic panel: Bili Direct 0.2, Bili Total 0.4, Albumin 3.9, Protein Total 6.5 (L), Alkphos 84, ALT 16, AST 20.  Magnesium: 2.0  Phosphorus: 4.1  BMP: potassium 7.0 (HH), chloride 113 (H), carbon dioxide 19 (L), glucose 283 (H), urea nitrogen 53 (H), creatinine 2.01 (H), GFR 35 (L), otherwise WNL  CBC: WBC 2.7 (L), HGB 9.6 (L),  (L)    Cardiac Risk Factors   Sex: male   Tobacco: former smoker  Hypertension: positive  Diabetes: positive  Hyperlipidemia: positive  Family History: Negative    Allergies:  Erythromycin  Vioxx     Medications:    Myocephenolate  Clotrimazole  Linagliptin  Cialis  Flexeril  Omeprazole  Tacrolimus  Magnesium oxide  Lidoderm patch  Lactobacillus   Compazine  Gabapentin  Oxycodone  Aspirin  Novolog 100 unit/mL  Lantus 100 unit/mL  Valcyte  Senna-docusate   Bactrim  Multivitamin  Zofran  Glucagon     Past Medical History:    Acute venous embolism and thrombosis  Cancer  Depression  Esophageal reflux  Fibromyalgia  Thrombophlebitis  Osteoarthritis  Hyperlipidemia  Chronic nonalcoholic liver disease  Testicular  hypofunction  RA  Diabetes  Hypertension  Sleep apnea    Past Surgical History:    Bench liver  Tracheostomy  Repair of deviated septum  Right knee arthroscopy  Cholecystectomy  EDG  Transplant liver recipient  donor    Family History:    Diabetes - father  Hypertension - father  Substance abuse - father  Arthritis - mother  Cancer - mother  Thyroid disease - mother    Social History:  Marital Status:   Presents to the ED with his daughter  Tobacco Use: former smoker  Alcohol Use: negative  PCP: Shay Kirkpatrick     Review of Systems   Constitutional: Negative for fever.   Respiratory: Negative for shortness of breath.    Cardiovascular: Negative for leg swelling.   All other systems reviewed and are negative.    Physical Exam     Patient Vitals for the past 24 hrs:   BP Temp Temp src Pulse Heart Rate Resp SpO2   17 191 - - - - - - 100 %   17 191 (!) 205/120 - - - 80 18 100 %   17 1910 (!) 205/120 - - - 88 - -   17 1845 - - - - 83 - -   17 1745 - - - - 79 - 100 %   17 1730 (!) 191/132 - - - 77 - 99 %   17 1715 - - - - 80 - 100 %   17 1712 - - - - 81 - 100 %   17 1619 - - - - - - 100 %   17 1601 (!) 191/107 98.1  F (36.7  C) Oral 85 - 16 100 %        Physical Exam    Nursing note and vitals reviewed.    Constitutional: Pleasant and well groomed.          HENT:    Mouth/Throat: Oropharynx is without swelling or erythema. Oral mucosa moist.    Eyes: Conjunctivae normal are normal. No scleral icterus.    Neck: Neck supple.   Cardiovascular: Normal rate, regular rhythm and intact distal pulses.    Pulmonary/Chest: Effort normal and breath sounds normal.   Abdominal: Soft.  No distension. There is no tenderness.   Musculoskeletal:  No edema, No calf tenderness.  Neurological:Alert. Coordination normal.   Skin: Skin is warm and dry.   Psychiatric: Normal mood and affect.     Emergency Department Course   ECG:  @ 1559  Indication: abnormal labs  Vent.  Rate 88 bpm. UT interval 150 ms. QRS duration 90 ms. QT/QTc 362/438 ms. P-R-T axis 39 -45 36.   Normal sinus rhythm. Left anterior fascicular block. Abnormal ECG.    Read by Dr. Herman.    Laboratory:  ISTAT Basic Met ICa HCT POCT: potassium 6.1 (HH), chloride 110 (H), total CO2 18 (L), glucose 247 (H), BUN 49 (H), creatinine 2.1 (H), GFR 33 (L), HGB 9.2 (L), hematocrit 27 (L), otherwise WNL   CBC:  WBC 3.0 (L), HGB 9.4 (L),  (L)  CMP: potassium 6.0 (H), chloride 113 (H), glucose 253 (H), BUN 54 (H), creatinine 1.97 (H), GFR 36 (L), protein total 6.4 (L), otherwise WNL     UA: Slightly cloudy yellow urine, GLC 50, protein albumin 100, mucous present, hyaline casts 20 (H), otherwise WNL    Interventions:  (1638) Normal Saline, 500 mL, IV bolus  (1759) Normal Saline, 1 liter, IV bolus    Emergency Department Course:  Nursing notes and vitals reviewed.  I performed an exam of the patient as documented above.   EKG was done, interpretation as above.  A peripheral IV was established. Blood was drawn from the patient. This was sent for laboratory testing, findings above.   (1737) I consulted with Dr. Gonsalez of the patient's transplant team regarding the patient.  (1812) I rechecked the patient and discussed his results and the plan. The patient continues to be adamant that his blood pressure is only elevated because he is in the ED. The patient requested that a manual pressure measured. After some difficulty finding a manual cuff it was measured at 185/115. The patient denies headache, chest pain. H refuses intervention for this level. 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 He states that he will return if it remains so elevated or if he develops symptoms. He has an appointment tomorrow as well.   I personally reviewed the laboratory results with the  Patient and answered all related questions prior to discharge. Repeat potassium was 5.3.     Impression &  Plan    Medical Decision Making:  This patient presents after he is called back from clinic due to elevated potassium. He has issues with potassium being elevated due to mild renal insufficiency as well dietary indiscretions in the past and is having a liver transplant in March. He is otherwise asymptomatic, but was noted to have an elevated blood pressure, which he states is not unusual when he is in the emergency department. ED evaluation is as noted above. His baseline potassium has been in the mid 5 s. Potassium from clinic was 7, but our potassium today is 6. He had no EKG changes concerning for hyperkalemia. I initiated fluids, as I felt that dehydration was likely part of the etiology of his worsening renal function as well the potassium I consulted with the hepatologist on call who wanted to administer a couple liters of fluid, repeat his potassium and likely discharge home with follow up tomorrow as previously scheduled. Repeat potassium revealed 5.3.     Diagnosis:    ICD-10-CM    1. Hyperkalemia E87.5    2. Elevated blood pressure reading without diagnosis of hypertension R03.0    3. Dehydration E86.0    4. Renal insufficiency N28.9    5. Status post liver transplantation (H) Z94.4        Disposition:  Discharge home with daughter.     I, Lyle Marks, am serving as a scribe on 6/6/2017 at 4:02 PM to personally document services performed by Dr. Kathy Herman based on my observations and the provider's statements to me.        Kathy Herman MD  06/06/17 [id number]

## 2017-06-06 NOTE — ED AVS SNAPSHOT
[facility] Department    [address]    [facility] [address]    Phone:  [phone]    Fax:  [phone]                                       Camacho Bhagat   MRN: [id number]    Department:  [facility] Emergency Department   Date of Visit:  6/6/2017           Patient Information     Date Of Birth          1965        Your diagnoses for this visit were:     Hyperkalemia     Elevated blood pressure reading without diagnosis of hypertension     Dehydration     Renal insufficiency     Status post liver transplantation (H)        You were seen by Kathy Herman MD.      Follow-up Information     Follow up with Shay Kirkpatrick MD In 1 day.    Specialty:  Internal Medicine    Contact information:     PHYSICIANS  [address]  [facility] [address] [phone]          Discharge Instructions       Check your blood pressure when you get home and return if it remains elevated.     Continue with low potassium diet.     Follow up tomorrow as scheduled.     Discharge Instructions  Hypertension - High Blood Pressure    During you visit to the Emergency Department, your blood pressure was higher than the recommended blood pressure.  This may be related to stress, pain, medication or other temporary conditions. In these cases, your blood pressure may return to normal on its own. If you have a history of high blood pressure, you may need to have your doctor adjust your medications. Sometimes, your high measurement here may indicate that you have developed high blood pressure that will stay high unless it is treated. Sudden very high blood pressure can cause problems, but usually high blood pressure causes problems over months to years.      Blood pressure is almost never lowered in the Emergency Department, because studies have shown that lowering blood pressure too quickly is much more dangerous than leaving it alone.    You need to follow up with  your doctor in 1-3 days to get your blood pressure rechecked.     Return to the Emergency Department if you start to have:    A severe headache.    Chest pain.    Shortness of breath.    Weakness or numbness that affects one part of the body.    Confusion.    Vision changes.    Significant swelling of legs and/or eyes.    A reaction to any medication started in the Emergency Department.    What can I do to help myself?    Avoid alcohol.    Take any blood pressure medicine that you are prescribed.    Get a good night s sleep.    Lower your salt intake.    Exercise.    Lose weight.    Manage stress.    If blood pressure medication was started in the Emergency Department:    The medicine may not have an immediate effect. The body and brain determine what blood pressure you have. The medicine s job is to retrain the body s  thermostat  to a lower blood pressure.    You will need to follow up with your doctor to see how this medicine is working for you.  If you were given a prescription for medicine here today, be sure to read all of the information (including the package insert) that comes with your prescription.  This will include important information about the medicine, its side effects, and any warnings that you need to know about.  The pharmacist who fills the prescription can provide more information and answer questions you may have about the medicine.  If you have questions or concerns that the pharmacist cannot address, please call or return to the Emergency Department.   Opioid Medication Information    Pain medications are among the most commonly prescribed medicines, so we are including this information for all our patients. If you did not receive pain medication or get a prescription for pain medicine, you can ignore it.     You may have been given a prescription for an opioid (narcotic) pain medicine and/or have received a pain medicine while here in the Emergency Department. These medicines can make you  drowsy or impaired. You must not drive, operate dangerous equipment, or engage in any other dangerous activities while taking these medications. If you drive while taking these medications, you could be arrested for DUI, or driving under the influence. Do not drink any alcohol while you are taking these medications.     Opioid pain medications can cause addiction. If you have a history of chemical dependency of any type, you are at a higher risk of becoming addicted to pain medications.  Only take these prescribed medications to treat your pain when all other options have been tried. Take it for as short a time and as few doses as possible. Store your pain pills in a secure place, as they are frequently stolen and provide a dangerous opportunity for children or visitors in your house to start abusing these powerful medications. We will not replace any lost or stolen medicine.  As soon as your pain is better, you should flush all your remaining medication.     Many prescription pain medications contain Tylenol  (acetaminophen), including Vicodin , Tylenol #3 , Norco , Lortab , and Percocet .  You should not take any extra pills of Tylenol  if you are using these prescription medications or you can get very sick.  Do not ever take more than 3000 mg of acetaminophen in any 24 hour period.    All opioids tend to cause constipation. Drink plenty of water and eat foods that have a lot of fiber, such as fruits, vegetables, prune juice, apple juice and high fiber cereal.  Take a laxative if you don t move your bowels at least every other day. Miralax , Milk of Magnesia, Colace , or Senna  can be used to keep you regular.      Remember that you can always come back to the Emergency Department if you are not able to see your regular doctor in the amount of time listed above, if you get any new symptoms, or if there is anything that worries you.      Future Appointments        Provider Department Dept Phone Center    6/7/2017  12:45 PM MD PHYLLIS Kim [facility] Hepatology [phone] [facility]    6/12/2017 11:30 AM MD PHYLLIS Mares [facility] Solid Organ Transplant [phone] [facility]      24 Hour Appointment Hotline       To make an appointment at any [facility], call [phone] ([phone]). If you don't have a family doctor or clinic, we will help you find one. [facility] are conveniently located to serve the needs of you and your family.             Review of your medicines      Our records show that you are taking the medicines listed below. If these are incorrect, please call your family doctor or clinic.        Dose / Directions Last dose taken    aspirin 325 MG tablet   Dose:  325 mg   Quantity:  180 tablet        1 tablet (325 mg) by Oral or Feeding Tube route daily   Refills:  4        clotrimazole 10 MG Lozg lozenge   Commonly known as:  MYCELEX   Dose:  1 Beatrice   Quantity:  120 each        Place 1 lozenge (1 Beatrice) inside cheek 4 times daily   Refills:  1        cyclobenzaprine 10 MG tablet   Commonly known as:  FLEXERIL   Dose:  10 mg   Quantity:  90 tablet        Take 1 tablet (10 mg) by mouth 3 times daily as needed for muscle spasms   Refills:  0        gabapentin 300 MG capsule   Commonly known as:  NEURONTIN   Dose:  300 mg   Quantity:  90 capsule        Take 1 capsule (300 mg) by mouth 3 times daily   Refills:  1        glucagon 1 MG Solr injection   Dose:  1 mg   Quantity:  1 each        Inject 1 mg Subcutaneous every 15 minutes as needed for low blood sugar (May repeat x 1 only)   Refills:  0        * insulin aspart 100 UNIT/ML injection   Commonly known as:  NovoLOG PEN   Quantity:  3 mL        DOSE:  1 units per 8 grams of carbohydrate. With meals and snacks. Only chart total amount of units given.  Do not give if pre-prandial glucose is less than 60 mg/dL.   Refills:  3        * insulin aspart 100 UNIT/ML injection   Commonly known as:  NovoLOG PEN   Dose:  1-10 Units   Quantity:  3 mL         Inject 1-10 Units Subcutaneous 3 times daily (before meals) For Pre-Meal  - 189 give 1 unit.  For Pre-Meal  - 239 give 2 units.  For Pre-Meal  - 289 give 3 units.  For Pre-Meal  - 339 give 4 units.  For Pre-Meal -399 give 5 units. For Pre-Meal -449 give 6 units. For Pre-Meal BG = or > 450 give 7 units.   Refills:  3        * insulin aspart 100 UNIT/ML injection   Commonly known as:  NovoLOG PEN   Dose:  1-7 Units   Quantity:  3 mL        Inject 1-7 Units Subcutaneous At Bedtime Bedtime Blood Glucose (BG) For  - 249 give 1 units.  For  - 299 give 2 units.  For  - 349 give 3 units. For - 399 give 4 units.  For BG = or > 400 give 5 units.   Refills:  3        insulin glargine 100 UNIT/ML injection   Commonly known as:  LANTUS   Dose:  45 Units   Quantity:  3 mL        Inject 45 Units Subcutaneous every 24 hours   Refills:  3        LACTOBACILLUS EXTRA STRENGTH Caps   Dose:  1 capsule   Quantity:  30 capsule        Take 1 capsule by mouth 2 times daily   Refills:  1        lidocaine 5 % Patch   Commonly known as:  LIDODERM   Dose:  1 patch   Quantity:  30 patch        Place 1 patch onto the skin every 24 hours   Refills:  1        magnesium oxide 400 MG tablet   Commonly known as:  MAG-OX   Dose:  400 mg   Quantity:  7 tablet        Take 1 tablet (400 mg) by mouth 2 times daily   Refills:  1        multivitamin, therapeutic with minerals Tabs tablet   Dose:  1 tablet   Quantity:  30 each        Take 1 tablet by mouth daily   Refills:  11        mycophenolate 250 MG capsule   Commonly known as:  CELLCEPT - GENERIC EQUIVALENT   Dose:  750 mg   Quantity:  90 capsule        Take 3 capsules (750 mg) by mouth every 12 hours   Refills:  3        omeprazole 20 MG CR capsule   Commonly known as:  priLOSEC   Dose:  20 mg   Quantity:  60 capsule        Take 1 capsule (20 mg) by mouth 2 times daily (before meals)   Refills:  11        ondansetron 4 MG ODT tab   Commonly  known as:  ZOFRAN-ODT   Dose:  4 mg   Quantity:  30 tablet        Take 1 tablet (4 mg) by mouth every 8 hours as needed for nausea   Refills:  0        oxyCODONE 5 MG IR tablet   Commonly known as:  ROXICODONE   Dose:  5-10 mg   Quantity:  40 tablet        Take 1-2 tablets (5-10 mg) by mouth every 4 hours as needed for moderate to severe pain   Refills:  0        prochlorperazine 5 MG tablet   Commonly known as:  COMPAZINE   Dose:  5-10 mg   Quantity:  90 tablet        Take 1-2 tablets (5-10 mg) by mouth every 6 hours as needed for nausea or vomiting   Refills:  0        senna-docusate 8.6-50 MG per tablet   Commonly known as:  SENOKOT-S;PERICOLACE   Dose:  2 tablet   Quantity:  100 tablet        Take 2 tablets by mouth 2 times daily   Refills:  1        sulfamethoxazole-trimethoprim 400-80 MG per tablet   Commonly known as:  BACTRIM/SEPTRA   Dose:  1 tablet   Indication:  PCP prophylaxis   Quantity:  30 tablet        Take 1 tablet by mouth daily   Refills:  5        tacrolimus 1 MG capsule   Commonly known as:  PROGRAF - GENERIC EQUIVALENT   Dose:  6 mg   Quantity:  360 capsule        Take 6 capsules (6 mg) by mouth every 12 hours   Refills:  11        * tadalafil 5 MG tablet   Commonly known as:  CIALIS   Dose:  5 mg   Quantity:  30 tablet        Take 1 tablet (5 mg) by mouth daily Never use with nitroglycerin, terazosin or doxazosin.   Refills:  11        * tadalafil 5 MG tablet   Commonly known as:  CIALIS   Dose:  5 mg   Quantity:  30 tablet        Take 1 tablet (5 mg) by mouth daily Never use with nitroglycerin, terazosin or doxazosin.   Refills:  1        TRADJENTA PO   Dose:  5 mg        Take 5 mg by mouth   Refills:  0        valGANciclovir 450 MG tablet   Commonly known as:  VALCYTE   Dose:  450 mg   Indication:  Treatment to Prevent Cytomegalovirus Disease   Quantity:  60 tablet        Take 1 tablet (450 mg) by mouth daily   Refills:  5        * Notice:  This list has 5 medication(s) that are the same as  other medications prescribed for you. Read the directions carefully, and ask your doctor or other care provider to review them with you.            Procedures and tests performed during your visit     Procedure/Test Number of Times Performed    Bladder scan 1    CBC with platelets differential 1    Cardiac Continuous Monitoring 2    Comprehensive metabolic panel 1    EKG 12 lead 1    ISTAT Basic Met ICa HCT POCT 2    ISTAT Chem 8 1    Peripheral IV catheter 1    UA with Microscopic 1      Orders Needing Specimen Collection     None      Pending Results     Date and Time Order Name Status Description    6/6/2017 1557 EKG 12 lead Preliminary     6/6/2017 1405 Tacrolimus level In process             Pending Culture Results     No orders found from 6/4/2017 to 6/7/2017.            Pending Results Instructions     If you had any lab results that were not finalized at the time of your Discharge, you can call the ED Lab Result RN at [phone]. You will be contacted by this team for any positive Lab results or changes in treatment. The nurses are available 7 days a week from 10A to 6:30P.  You can leave a message 24 hours per day and they will return your call.        Test Results From Your Hospital Stay        6/6/2017  5:03 PM      Component Results     Component Value Ref Range & Units Status    WBC 3.0 (L) 4.0 - 11.0 10e9/L Final    RBC Count 3.12 (L) 4.4 - 5.9 10e12/L Final    Hemoglobin 9.4 (L) 13.3 - 17.7 g/dL Final    Hematocrit 27.5 (L) 40.0 - 53.0 % Final    MCV 88 78 - 100 fl Final    MCH 30.1 26.5 - 33.0 pg Final    MCHC 34.2 31.5 - 36.5 g/dL Final    RDW 15.7 (H) 10.0 - 15.0 % Final    Platelet Count 110 (L) 150 - 450 10e9/L Final    Diff Method Manual Differential  Final    % Neutrophils 82.0 % Final    % Lymphocytes 7.0 % Final    % Monocytes 4.0 % Final    % Eosinophils 5.0 % Final    % Basophils 2.0 % Final    Absolute Neutrophil 2.5 1.6 - 8.3 10e9/L Final    Absolute Lymphocytes 0.2 (L) 0.8 - 5.3 10e9/L  Final    Absolute Monocytes 0.1 0.0 - 1.3 10e9/L Final    Absolute Eosinophils 0.2 0.0 - 0.7 10e9/L Final    Absolute Basophils 0.1 0.0 - 0.2 10e9/L Final    RBC Morphology   Final    Consistent with reported results    Platelet Estimate Decreased  Final         6/6/2017  4:46 PM      Component Results     Component Value Ref Range & Units Status    Sodium 139 133 - 144 mmol/L Final    Potassium 6.0 (H) 3.4 - 5.3 mmol/L Final    Chloride 113 (H) 94 - 109 mmol/L Final    Carbon Dioxide 21 20 - 32 mmol/L Final    Anion Gap 5 3 - 14 mmol/L Final    Glucose 253 (H) 70 - 99 mg/dL Final    Urea Nitrogen 54 (H) 7 - 30 mg/dL Final    Creatinine 1.97 (H) 0.66 - 1.25 mg/dL Final    GFR Estimate 36 (L) >60 mL/min/1.7m2 Final    Non  GFR Calc    GFR Estimate If Black 43 (L) >60 mL/min/1.7m2 Final    African American GFR Calc    Calcium 8.7 8.5 - 10.1 mg/dL Final    Bilirubin Total 0.3 0.2 - 1.3 mg/dL Final    Albumin 3.9 3.4 - 5.0 g/dL Final    Protein Total 6.4 (L) 6.8 - 8.8 g/dL Final    Alkaline Phosphatase 84 40 - 150 U/L Final    ALT 16 0 - 70 U/L Final    AST 17 0 - 45 U/L Final         6/6/2017  4:45 PM      Component Results     Component Value Ref Range & Units Status    Color Urine Yellow  Final    Appearance Urine Slightly Cloudy  Final    Glucose Urine 50 (A) NEG mg/dL Final    Bilirubin Urine Negative NEG Final    Ketones Urine Negative NEG mg/dL Final    Specific Gravity Urine 1.015 1.003 - 1.035 Final    Blood Urine Negative NEG Final    pH Urine 5.0 5.0 - 7.0 pH Final    Protein Albumin Urine 100 (A) NEG mg/dL Final    Urobilinogen mg/dL 0.0 0.0 - 2.0 mg/dL Final    Nitrite Urine Negative NEG Final    Leukocyte Esterase Urine Negative NEG Final    Source Midstream Urine  Final    WBC Urine 1 0 - 2 /HPF Final    RBC Urine 2 0 - 2 /HPF Final    Transitional Epi <1 0 - 1 /HPF Final    Mucous Urine Present (A) NEG /LPF Final    Hyaline Casts 20 (H) 0 - 2 /LPF Final         6/6/2017  4:27 PM       Component Results     Component Value Ref Range & Units Status    Sodium 140 133 - 144 mmol/L Final    Potassium 6.1 (HH) 3.4 - 5.3 mmol/L Final    Chloride 110 (H) 94 - 109 mmol/L Final    Total CO2 18 (L) 20 - 32 mmol/L Final    Anion Gap 12 6 - 17 mmol/L Final    Glucose 247 (H) 70 - 99 mg/dL Final    Urea Nitrogen 49 (H) 7 - 30 mg/dL Final    Creatinine 2.1 (H) 0.66 - 1.25 mg/dL Final    GFR Estimate 33 (L) >60 mL/min/1.7m2 Final    GFR Estimate If Black 40 (L) >60 mL/min/1.7m2 Final    Calcium Ionized 5.0 4.4 - 5.2 mg/dL Final    Hemoglobin 9.2 (L) 13.3 - 17.7 g/dL Final    Hematocrit - POCT 27 (L) 40.0 - 53.0 %PCV Final         6/6/2017  7:36 PM      Component Results     Component Value Ref Range & Units Status    Sodium 142 133 - 144 mmol/L Final    Potassium 5.3 3.4 - 5.3 mmol/L Final    Chloride 113 (H) 94 - 109 mmol/L Final    Total CO2 16 (L) 20 - 32 mmol/L Final    Anion Gap 13 6 - 17 mmol/L Final    Glucose 175 (H) 70 - 99 mg/dL Final    Urea Nitrogen 44 (H) 7 - 30 mg/dL Final    Creatinine 1.8 (H) 0.66 - 1.25 mg/dL Final    GFR Estimate 40 (L) >60 mL/min/1.7m2 Final    GFR Estimate If Black 48 (L) >60 mL/min/1.7m2 Final    Calcium Ionized 4.9 4.4 - 5.2 mg/dL Final    Hemoglobin 8.5 (L) 13.3 - 17.7 g/dL Final    Hematocrit - POCT 25 (L) 40.0 - 53.0 %PCV Final                Clinical Quality Measure: Blood Pressure Screening     Your blood pressure was checked while you were in the emergency department today. The last reading we obtained was  BP: (!) 185/115 . Please read the guidelines below about what these numbers mean and what you should do about them.  If your systolic blood pressure (the top number) is less than 120 and your diastolic blood pressure (the bottom number) is less than 80, then your blood pressure is normal. There is nothing more that you need to do about it.  If your systolic blood pressure (the top number) is 120-139 or your diastolic blood pressure (the bottom number) is 80-89,  your blood pressure may be higher than it should be. You should have your blood pressure rechecked within a year by a primary care provider.  If your systolic blood pressure (the top number) is 140 or greater or your diastolic blood pressure (the bottom number) is 90 or greater, you may have high blood pressure. High blood pressure is treatable, but if left untreated over time it can put you at risk for heart attack, stroke, or kidney failure. You should have your blood pressure rechecked by a primary care provider within the next 4 weeks.  If your provider in the emergency department today gave you specific instructions to follow-up with your doctor or provider even sooner than that, you should follow that instruction and not wait for up to 4 weeks for your follow-up visit.        Thank you for choosing [facility]       Thank you for choosing [facility] for your care. Our goal is always to provide you with excellent care. Hearing back from our patients is one way we can continue to improve our services. Please take a few minutes to complete the written survey that you may receive in the mail after you visit with us. Thank you!        [employer] Information     [employer] gives you secure access to your electronic health record. If you see a primary care provider, you can also send messages to your care team and make appointments. If you have questions, please call your primary care clinic.  If you do not have a primary care provider, please call [phone] and they will assist you.        Care EveryWhere ID     This is your Care EveryWhere ID. This could be used by other organizations to access your [facility] medical records  [id number]        After Visit Summary       This is your record. Keep this with you and show to your community pharmacist(s) and doctor(s) at your next visit.

## 2017-06-06 NOTE — TELEPHONE ENCOUNTER
DATE:  6/6/2017   TIME OF RECEIPT FROM LAB:  2:58PM  LAB TEST:  K+  LAB VALUE:  7.0  RESULTS GIVEN WITH READ-BACK TO (PROVIDER):  Verbally informed Abril Doty RN. Also informed Abril that tube not hemolyzed.  TIME LAB VALUE REPORTED TO PROVIDER:  3:00PM

## 2017-06-06 NOTE — TELEPHONE ENCOUNTER
Spoke with Camacho, reviewed elevated potassium of 7.0,  Recommending that he go into the ER at [facility].   He does report drinking some gaterade yesterday, and states he knows the high potassium foods to avoid.  He states that he doesn't want to be hospitalized just for the high potassium, recommended he discuss with ER, likely needs fluids, and recheck of labs.  Camacho verbalized understanding.  He does have an appt with  tomorrow, does not want to miss this appt.

## 2017-06-06 NOTE — ED NOTES
"Pt c/o about how tight the blood pressure cuff was and was not wearing it when writer went in to assess. Writer changed cuff to larger size and noted cuff to be functioning appropriately but pt ripped off cuff when taking another blood pressure. Pt became agitated and stated that he \"never has high blood pressure\" and states these is \"something wrong the machine.\" When blood pressure was finally able to re-taken, bp elevated to 191/132. Pt requesting to speak with MD stating \"I've never had anything wrong with my blood pressure and I don't have anything wrong with my kidneys! Get the doctor!\" MD advised and stated she would speak with him shortly.   "

## 2017-06-06 NOTE — TELEPHONE ENCOUNTER
Per lab at [facility],  Potassium of 7.0 completed this afternoon was not hemolyzed.       Phone call to pt, with message directing him to go into the ER, his local ER for further assessment, stressed importance of this being treated.     Phone call and message left for his wife Danica, about high potassium, and that pt is to go to local ER for further assessment and treatment.

## 2017-06-07 ENCOUNTER — OFFICE VISIT (OUTPATIENT)
Dept: GASTROENTEROLOGY | Facility: CLINIC | Age: 52
End: 2017-06-07
Attending: INTERNAL MEDICINE
Payer: COMMERCIAL

## 2017-06-07 ENCOUNTER — MYC MEDICAL ADVICE (OUTPATIENT)
Dept: INTERNAL MEDICINE | Facility: CLINIC | Age: 52
End: 2017-06-07

## 2017-06-07 VITALS
BODY MASS INDEX: 29.26 KG/M2 | SYSTOLIC BLOOD PRESSURE: 199 MMHG | WEIGHT: 216 LBS | HEART RATE: 94 BPM | OXYGEN SATURATION: 100 % | HEIGHT: 72 IN | TEMPERATURE: 98.2 F | DIASTOLIC BLOOD PRESSURE: 103 MMHG

## 2017-06-07 DIAGNOSIS — M19.90 OSTEOARTHRITIS: Primary | ICD-10-CM

## 2017-06-07 DIAGNOSIS — Z94.4 LIVER TRANSPLANT RECIPIENT (H): ICD-10-CM

## 2017-06-07 LAB
INTERPRETATION ECG - MUSE: NORMAL
TACROLIMUS BLD-MCNC: 13.4 UG/L (ref 5–15)
TME LAST DOSE: NORMAL H

## 2017-06-07 PROCEDURE — 99212 OFFICE O/P EST SF 10 MIN: CPT | Mod: ZF

## 2017-06-07 RX ORDER — TACROLIMUS 1 MG/1
6 CAPSULE ORAL EVERY 12 HOURS
Qty: 360 CAPSULE | Refills: 11 | Status: CANCELLED | OUTPATIENT
Start: 2017-06-07

## 2017-06-07 ASSESSMENT — PAIN SCALES - GENERAL: PAINLEVEL: NO PAIN (0)

## 2017-06-07 NOTE — DISCHARGE INSTRUCTIONS
Check your blood pressure when you get home and return if it remains elevated.     Continue with low potassium diet.     Follow up tomorrow as scheduled.     Discharge Instructions  Hypertension - High Blood Pressure    During you visit to the Emergency Department, your blood pressure was higher than the recommended blood pressure.  This may be related to stress, pain, medication or other temporary conditions. In these cases, your blood pressure may return to normal on its own. If you have a history of high blood pressure, you may need to have your doctor adjust your medications. Sometimes, your high measurement here may indicate that you have developed high blood pressure that will stay high unless it is treated. Sudden very high blood pressure can cause problems, but usually high blood pressure causes problems over months to years.      Blood pressure is almost never lowered in the Emergency Department, because studies have shown that lowering blood pressure too quickly is much more dangerous than leaving it alone.    You need to follow up with your doctor in 1-3 days to get your blood pressure rechecked.     Return to the Emergency Department if you start to have:    A severe headache.    Chest pain.    Shortness of breath.    Weakness or numbness that affects one part of the body.    Confusion.    Vision changes.    Significant swelling of legs and/or eyes.    A reaction to any medication started in the Emergency Department.    What can I do to help myself?    Avoid alcohol.    Take any blood pressure medicine that you are prescribed.    Get a good night s sleep.    Lower your salt intake.    Exercise.    Lose weight.    Manage stress.    If blood pressure medication was started in the Emergency Department:    The medicine may not have an immediate effect. The body and brain determine what blood pressure you have. The medicine s job is to retrain the body s  thermostat  to a lower blood pressure.    You will  need to follow up with your doctor to see how this medicine is working for you.  If you were given a prescription for medicine here today, be sure to read all of the information (including the package insert) that comes with your prescription.  This will include important information about the medicine, its side effects, and any warnings that you need to know about.  The pharmacist who fills the prescription can provide more information and answer questions you may have about the medicine.  If you have questions or concerns that the pharmacist cannot address, please call or return to the Emergency Department.   Opioid Medication Information    Pain medications are among the most commonly prescribed medicines, so we are including this information for all our patients. If you did not receive pain medication or get a prescription for pain medicine, you can ignore it.     You may have been given a prescription for an opioid (narcotic) pain medicine and/or have received a pain medicine while here in the Emergency Department. These medicines can make you drowsy or impaired. You must not drive, operate dangerous equipment, or engage in any other dangerous activities while taking these medications. If you drive while taking these medications, you could be arrested for DUI, or driving under the influence. Do not drink any alcohol while you are taking these medications.     Opioid pain medications can cause addiction. If you have a history of chemical dependency of any type, you are at a higher risk of becoming addicted to pain medications.  Only take these prescribed medications to treat your pain when all other options have been tried. Take it for as short a time and as few doses as possible. Store your pain pills in a secure place, as they are frequently stolen and provide a dangerous opportunity for children or visitors in your house to start abusing these powerful medications. We will not replace any lost or stolen  medicine.  As soon as your pain is better, you should flush all your remaining medication.     Many prescription pain medications contain Tylenol  (acetaminophen), including Vicodin , Tylenol #3 , Norco , Lortab , and Percocet .  You should not take any extra pills of Tylenol  if you are using these prescription medications or you can get very sick.  Do not ever take more than 3000 mg of acetaminophen in any 24 hour period.    All opioids tend to cause constipation. Drink plenty of water and eat foods that have a lot of fiber, such as fruits, vegetables, prune juice, apple juice and high fiber cereal.  Take a laxative if you don t move your bowels at least every other day. Miralax , Milk of Magnesia, Colace , or Senna  can be used to keep you regular.      Remember that you can always come back to the Emergency Department if you are not able to see your regular doctor in the amount of time listed above, if you get any new symptoms, or if there is anything that worries you.

## 2017-06-07 NOTE — LETTER
6/7/2017      RE: Camacho Bahgat  [address] [facility] [id number]       I had the pleasure of seeing Camacho Bhagat for followup in the Liver Transplantation Clinic at the [facility] on 06/07/2017.  Mr. 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      For the most part, he is doing really quite well.  Unfortunately, over the weekend, he became dehydrated.  When he had his blood work done, he was found to have an elevated creatinine and elevated potassium.  He was seen in the emergency room where they treated him with fluids, and those both resolved.  We do not have his tacrolimus level back, but I suspect that may have been elevated as well.        At present, he denies any abdominal pain, itching, skin rash or fatigue.  He denies any increased abdominal girth or lower extremity edema.  He denies any fevers or chills, cough or shortness of breath.  He denies any nausea, vomiting, diarrhea or constipation.  His appetite has been good, and his weight has been stable.  There have been no other new events since he was last seen other than that noted above.  I should point out that he is trying to lose weight and is hoping to lose another 16 pounds.       Current Outpatient Prescriptions   Medication     mycophenolate (CELLCEPT - GENERIC EQUIVALENT) 250 MG capsule     clotrimazole (MYCELEX) 10 MG LOZG lozenge     tadalafil (CIALIS) 5 MG tablet     tadalafil (CIALIS) 5 MG tablet     cyclobenzaprine (FLEXERIL) 10 MG tablet     omeprazole (PRILOSEC) 20 MG CR capsule     tacrolimus (PROGRAF - GENERIC EQUIVALENT) 1 MG capsule     magnesium oxide (MAG-OX) 400 MG tablet     LACTOBACILLUS EXTRA STRENGTH CAPS     gabapentin (NEURONTIN) 300 MG capsule     oxyCODONE (ROXICODONE) 5 MG IR tablet     aspirin 325 MG tablet     insulin aspart (NOVOLOG PEN) 100 UNIT/ML  injection     insulin glargine (LANTUS) 100 UNIT/ML injection     valGANciclovir (VALCYTE) 450 MG tablet     sulfamethoxazole-trimethoprim (BACTRIM/SEPTRA) 400-80 MG per tablet     multivitamin, therapeutic with minerals (THERA-VIT-M) TABS tablet     ondansetron (ZOFRAN-ODT) 4 MG ODT tab     glucagon 1 MG SOLR injection     Linagliptin (TRADJENTA PO)     lidocaine (LIDODERM) 5 % Patch     prochlorperazine (COMPAZINE) 5 MG tablet     [DISCONTINUED] insulin aspart (NOVOLOG PEN) 100 UNIT/ML injection     [DISCONTINUED] insulin aspart (NOVOLOG PEN) 100 UNIT/ML injection     No current facility-administered medications for this visit.      B/P: 199/103, T: 98.2, P: 94, R: Data Unavailable    HEENT exam shows no scleral icterus and no temple muscle wasting.  Chest is clear.  Abdominal exam shows no increase in girth.  No masses or tenderness to palpation are present.  His liver is 10 cm in span without left lobe enlargement.  No spleen tip is palpable, and extremity exam shows no edema.  Skin exam shows no stigmata of chronic liver disease.  Neurologic exam is nonfocal.       Recent Results (from the past 168 hour(s))   CBC with platelets    Collection Time: 06/06/17  2:12 PM   Result Value Ref Range    WBC 2.7 (L) 4.0 - 11.0 10e9/L    RBC Count 3.23 (L) 4.4 - 5.9 10e12/L    Hemoglobin 9.6 (L) 13.3 - 17.7 g/dL    Hematocrit 28.7 (L) 40.0 - 53.0 %    MCV 89 78 - 100 fl    MCH 29.7 26.5 - 33.0 pg    MCHC 33.4 31.5 - 36.5 g/dL    RDW 15.7 (H) 10.0 - 15.0 %    Platelet Count 105 (L) 150 - 450 10e9/L   Basic metabolic panel    Collection Time: 06/06/17  2:12 PM   Result Value Ref Range    Sodium 139 133 - 144 mmol/L    Potassium 7.0 (HH) 3.4 - 5.3 mmol/L    Chloride 113 (H) 94 - 109 mmol/L    Carbon Dioxide 19 (L) 20 - 32 mmol/L    Anion Gap 7 3 - 14 mmol/L    Glucose 283 (H) 70 - 99 mg/dL    Urea Nitrogen 53 (H) 7 - 30 mg/dL    Creatinine 2.01 (H) 0.66 - 1.25 mg/dL    GFR Estimate 35 (L) >60 mL/min/1.7m2    GFR Estimate If  Black 42 (L) >60 mL/min/1.7m2    Calcium 8.8 8.5 - 10.1 mg/dL   Phosphorus    Collection Time: 06/06/17  2:12 PM   Result Value Ref Range    Phosphorus 4.1 2.5 - 4.5 mg/dL   Magnesium    Collection Time: 06/06/17  2:12 PM   Result Value Ref Range    Magnesium 2.0 1.6 - 2.3 mg/dL   Hepatic panel    Collection Time: 06/06/17  2:12 PM   Result Value Ref Range    Bilirubin Direct 0.2 0.0 - 0.2 mg/dL    Bilirubin Total 0.4 0.2 - 1.3 mg/dL    Albumin 3.9 3.4 - 5.0 g/dL    Protein Total 6.5 (L) 6.8 - 8.8 g/dL    Alkaline Phosphatase 84 40 - 150 U/L    ALT 16 0 - 70 U/L    AST 20 0 - 45 U/L   EKG 12 lead    Collection Time: 06/06/17  3:59 PM   Result Value Ref Range    Interpretation ECG Click View Image link to view waveform and result    UA with Microscopic    Collection Time: 06/06/17  4:05 PM   Result Value Ref Range    Color Urine Yellow     Appearance Urine Slightly Cloudy     Glucose Urine 50 (A) NEG mg/dL    Bilirubin Urine Negative NEG    Ketones Urine Negative NEG mg/dL    Specific Gravity Urine 1.015 1.003 - 1.035    Blood Urine Negative NEG    pH Urine 5.0 5.0 - 7.0 pH    Protein Albumin Urine 100 (A) NEG mg/dL    Urobilinogen mg/dL 0.0 0.0 - 2.0 mg/dL    Nitrite Urine Negative NEG    Leukocyte Esterase Urine Negative NEG    Source Midstream Urine     WBC Urine 1 0 - 2 /HPF    RBC Urine 2 0 - 2 /HPF    Transitional Epi <1 0 - 1 /HPF    Mucous Urine Present (A) NEG /LPF    Hyaline Casts 20 (H) 0 - 2 /LPF   CBC with platelets differential    Collection Time: 06/06/17  4:18 PM   Result Value Ref Range    WBC 3.0 (L) 4.0 - 11.0 10e9/L    RBC Count 3.12 (L) 4.4 - 5.9 10e12/L    Hemoglobin 9.4 (L) 13.3 - 17.7 g/dL    Hematocrit 27.5 (L) 40.0 - 53.0 %    MCV 88 78 - 100 fl    MCH 30.1 26.5 - 33.0 pg    MCHC 34.2 31.5 - 36.5 g/dL    RDW 15.7 (H) 10.0 - 15.0 %    Platelet Count 110 (L) 150 - 450 10e9/L    Diff Method Manual Differential     % Neutrophils 82.0 %    % Lymphocytes 7.0 %    % Monocytes 4.0 %    % Eosinophils  5.0 %    % Basophils 2.0 %    Absolute Neutrophil 2.5 1.6 - 8.3 10e9/L    Absolute Lymphocytes 0.2 (L) 0.8 - 5.3 10e9/L    Absolute Monocytes 0.1 0.0 - 1.3 10e9/L    Absolute Eosinophils 0.2 0.0 - 0.7 10e9/L    Absolute Basophils 0.1 0.0 - 0.2 10e9/L    RBC Morphology Consistent with reported results     Platelet Estimate Decreased    Comprehensive metabolic panel    Collection Time: 06/06/17  4:18 PM   Result Value Ref Range    Sodium 139 133 - 144 mmol/L    Potassium 6.0 (H) 3.4 - 5.3 mmol/L    Chloride 113 (H) 94 - 109 mmol/L    Carbon Dioxide 21 20 - 32 mmol/L    Anion Gap 5 3 - 14 mmol/L    Glucose 253 (H) 70 - 99 mg/dL    Urea Nitrogen 54 (H) 7 - 30 mg/dL    Creatinine 1.97 (H) 0.66 - 1.25 mg/dL    GFR Estimate 36 (L) >60 mL/min/1.7m2    GFR Estimate If Black 43 (L) >60 mL/min/1.7m2    Calcium 8.7 8.5 - 10.1 mg/dL    Bilirubin Total 0.3 0.2 - 1.3 mg/dL    Albumin 3.9 3.4 - 5.0 g/dL    Protein Total 6.4 (L) 6.8 - 8.8 g/dL    Alkaline Phosphatase 84 40 - 150 U/L    ALT 16 0 - 70 U/L    AST 17 0 - 45 U/L   ISTAT Basic Met ICa HCT POCT    Collection Time: 06/06/17  4:20 PM   Result Value Ref Range    Sodium 140 133 - 144 mmol/L    Potassium 6.1 (HH) 3.4 - 5.3 mmol/L    Chloride 110 (H) 94 - 109 mmol/L    Total CO2 18 (L) 20 - 32 mmol/L    Anion Gap 12 6 - 17 mmol/L    Glucose 247 (H) 70 - 99 mg/dL    Urea Nitrogen 49 (H) 7 - 30 mg/dL    Creatinine 2.1 (H) 0.66 - 1.25 mg/dL    GFR Estimate 33 (L) >60 mL/min/1.7m2    GFR Estimate If Black 40 (L) >60 mL/min/1.7m2    Calcium Ionized 5.0 4.4 - 5.2 mg/dL    Hemoglobin 9.2 (L) 13.3 - 17.7 g/dL    Hematocrit - POCT 27 (L) 40.0 - 53.0 %PCV   ISTAT Basic Met ICa HCT POCT    Collection Time: 06/06/17  7:29 PM   Result Value Ref Range    Sodium 142 133 - 144 mmol/L    Potassium 5.3 3.4 - 5.3 mmol/L    Chloride 113 (H) 94 - 109 mmol/L    Total CO2 16 (L) 20 - 32 mmol/L    Anion Gap 13 6 - 17 mmol/L    Glucose 175 (H) 70 - 99 mg/dL    Urea Nitrogen 44 (H) 7 - 30 mg/dL     Creatinine 1.8 (H) 0.66 - 1.25 mg/dL    GFR Estimate 40 (L) >60 mL/min/1.7m2    GFR Estimate If Black 48 (L) >60 mL/min/1.7m2    Calcium Ionized 4.9 4.4 - 5.2 mg/dL    Hemoglobin 8.5 (L) 13.3 - 17.7 g/dL    Hematocrit - POCT 25 (L) 40.0 - 53.0 %PCV      My impression is that Mr. Bhagat is doing well 3 months post-transplant.  Certainly, his recovery has been quite remarkable.  I will lower his tacrolimus dose of 5 mg b.i.d. in anticipation that his results will be high.  We will try to maintain his CellCept and be able to use his tacrolimus dose rather than discontinue CellCept.  He does probably have some underlying chronic kidney disease which relates to his acute kidney injury, and I am a bit concerned that he is still anemic and may need an additional anemia evaluation.  I will quantify the protein in his urine, as that was elevated on his urinalysis.  I, otherwise, will not be making any change to his medical regimen.  I will see him back in the clinic again in 6 weeks.      Thank you very much for allowing me to participate in the care of this patient.  If you have any questions regarding my recommendations, please do not hesitate to contact me.       Toñito Camejo MD      Professor of Medicine  [employer]      Executive Medical Director, Solid Organ Transplant Program  [facility]

## 2017-06-07 NOTE — NURSING NOTE
Chief Complaint   Patient presents with     RECHECK     Post Liver TXP   Pt roomed, vitals, meds, and allergies reviewed with pt. Pt ready for provider.  rAic Villanueva, CMA

## 2017-06-07 NOTE — PROGRESS NOTES
I had the pleasure of seeing Camacho Bhagat for followup in the Liver Transplantation Clinic at the [facility] on 06/07/2017.  Mr. 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      For the most part, he is doing really quite well.  Unfortunately, over the weekend, he became dehydrated.  When he had his blood work done, he was found to have an elevated creatinine and elevated potassium.  He was seen in the emergency room where they treated him with fluids, and those both resolved.  We do not have his tacrolimus level back, but I suspect that may have been elevated as well.        At present, he denies any abdominal pain, itching, skin rash or fatigue.  He denies any increased abdominal girth or lower extremity edema.  He denies any fevers or chills, cough or shortness of breath.  He denies any nausea, vomiting, diarrhea or constipation.  His appetite has been good, and his weight has been stable.  There have been no other new events since he was last seen other than that noted above.  I should point out that he is trying to lose weight and is hoping to lose another 16 pounds.       Current Outpatient Prescriptions   Medication     mycophenolate (CELLCEPT - GENERIC EQUIVALENT) 250 MG capsule     clotrimazole (MYCELEX) 10 MG LOZG lozenge     tadalafil (CIALIS) 5 MG tablet     tadalafil (CIALIS) 5 MG tablet     cyclobenzaprine (FLEXERIL) 10 MG tablet     omeprazole (PRILOSEC) 20 MG CR capsule     tacrolimus (PROGRAF - GENERIC EQUIVALENT) 1 MG capsule     magnesium oxide (MAG-OX) 400 MG tablet     LACTOBACILLUS EXTRA STRENGTH CAPS     gabapentin (NEURONTIN) 300 MG capsule     oxyCODONE (ROXICODONE) 5 MG IR tablet     aspirin 325 MG tablet     insulin aspart (NOVOLOG PEN) 100 UNIT/ML injection     insulin glargine (LANTUS) 100 UNIT/ML injection     valGANciclovir  (VALCYTE) 450 MG tablet     sulfamethoxazole-trimethoprim (BACTRIM/SEPTRA) 400-80 MG per tablet     multivitamin, therapeutic with minerals (THERA-VIT-M) TABS tablet     ondansetron (ZOFRAN-ODT) 4 MG ODT tab     glucagon 1 MG SOLR injection     Linagliptin (TRADJENTA PO)     lidocaine (LIDODERM) 5 % Patch     prochlorperazine (COMPAZINE) 5 MG tablet     [DISCONTINUED] insulin aspart (NOVOLOG PEN) 100 UNIT/ML injection     [DISCONTINUED] insulin aspart (NOVOLOG PEN) 100 UNIT/ML injection     No current facility-administered medications for this visit.      B/P: 199/103, T: 98.2, P: 94, R: Data Unavailable    HEENT exam shows no scleral icterus and no temple muscle wasting.  Chest is clear.  Abdominal exam shows no increase in girth.  No masses or tenderness to palpation are present.  His liver is 10 cm in span without left lobe enlargement.  No spleen tip is palpable, and extremity exam shows no edema.  Skin exam shows no stigmata of chronic liver disease.  Neurologic exam is nonfocal.       Recent Results (from the past 168 hour(s))   CBC with platelets    Collection Time: 06/06/17  2:12 PM   Result Value Ref Range    WBC 2.7 (L) 4.0 - 11.0 10e9/L    RBC Count 3.23 (L) 4.4 - 5.9 10e12/L    Hemoglobin 9.6 (L) 13.3 - 17.7 g/dL    Hematocrit 28.7 (L) 40.0 - 53.0 %    MCV 89 78 - 100 fl    MCH 29.7 26.5 - 33.0 pg    MCHC 33.4 31.5 - 36.5 g/dL    RDW 15.7 (H) 10.0 - 15.0 %    Platelet Count 105 (L) 150 - 450 10e9/L   Basic metabolic panel    Collection Time: 06/06/17  2:12 PM   Result Value Ref Range    Sodium 139 133 - 144 mmol/L    Potassium 7.0 (HH) 3.4 - 5.3 mmol/L    Chloride 113 (H) 94 - 109 mmol/L    Carbon Dioxide 19 (L) 20 - 32 mmol/L    Anion Gap 7 3 - 14 mmol/L    Glucose 283 (H) 70 - 99 mg/dL    Urea Nitrogen 53 (H) 7 - 30 mg/dL    Creatinine 2.01 (H) 0.66 - 1.25 mg/dL    GFR Estimate 35 (L) >60 mL/min/1.7m2    GFR Estimate If Black 42 (L) >60 mL/min/1.7m2    Calcium 8.8 8.5 - 10.1 mg/dL   Phosphorus     Collection Time: 06/06/17  2:12 PM   Result Value Ref Range    Phosphorus 4.1 2.5 - 4.5 mg/dL   Magnesium    Collection Time: 06/06/17  2:12 PM   Result Value Ref Range    Magnesium 2.0 1.6 - 2.3 mg/dL   Hepatic panel    Collection Time: 06/06/17  2:12 PM   Result Value Ref Range    Bilirubin Direct 0.2 0.0 - 0.2 mg/dL    Bilirubin Total 0.4 0.2 - 1.3 mg/dL    Albumin 3.9 3.4 - 5.0 g/dL    Protein Total 6.5 (L) 6.8 - 8.8 g/dL    Alkaline Phosphatase 84 40 - 150 U/L    ALT 16 0 - 70 U/L    AST 20 0 - 45 U/L   EKG 12 lead    Collection Time: 06/06/17  3:59 PM   Result Value Ref Range    Interpretation ECG Click View Image link to view waveform and result    UA with Microscopic    Collection Time: 06/06/17  4:05 PM   Result Value Ref Range    Color Urine Yellow     Appearance Urine Slightly Cloudy     Glucose Urine 50 (A) NEG mg/dL    Bilirubin Urine Negative NEG    Ketones Urine Negative NEG mg/dL    Specific Gravity Urine 1.015 1.003 - 1.035    Blood Urine Negative NEG    pH Urine 5.0 5.0 - 7.0 pH    Protein Albumin Urine 100 (A) NEG mg/dL    Urobilinogen mg/dL 0.0 0.0 - 2.0 mg/dL    Nitrite Urine Negative NEG    Leukocyte Esterase Urine Negative NEG    Source Midstream Urine     WBC Urine 1 0 - 2 /HPF    RBC Urine 2 0 - 2 /HPF    Transitional Epi <1 0 - 1 /HPF    Mucous Urine Present (A) NEG /LPF    Hyaline Casts 20 (H) 0 - 2 /LPF   CBC with platelets differential    Collection Time: 06/06/17  4:18 PM   Result Value Ref Range    WBC 3.0 (L) 4.0 - 11.0 10e9/L    RBC Count 3.12 (L) 4.4 - 5.9 10e12/L    Hemoglobin 9.4 (L) 13.3 - 17.7 g/dL    Hematocrit 27.5 (L) 40.0 - 53.0 %    MCV 88 78 - 100 fl    MCH 30.1 26.5 - 33.0 pg    MCHC 34.2 31.5 - 36.5 g/dL    RDW 15.7 (H) 10.0 - 15.0 %    Platelet Count 110 (L) 150 - 450 10e9/L    Diff Method Manual Differential     % Neutrophils 82.0 %    % Lymphocytes 7.0 %    % Monocytes 4.0 %    % Eosinophils 5.0 %    % Basophils 2.0 %    Absolute Neutrophil 2.5 1.6 - 8.3 10e9/L     Absolute Lymphocytes 0.2 (L) 0.8 - 5.3 10e9/L    Absolute Monocytes 0.1 0.0 - 1.3 10e9/L    Absolute Eosinophils 0.2 0.0 - 0.7 10e9/L    Absolute Basophils 0.1 0.0 - 0.2 10e9/L    RBC Morphology Consistent with reported results     Platelet Estimate Decreased    Comprehensive metabolic panel    Collection Time: 06/06/17  4:18 PM   Result Value Ref Range    Sodium 139 133 - 144 mmol/L    Potassium 6.0 (H) 3.4 - 5.3 mmol/L    Chloride 113 (H) 94 - 109 mmol/L    Carbon Dioxide 21 20 - 32 mmol/L    Anion Gap 5 3 - 14 mmol/L    Glucose 253 (H) 70 - 99 mg/dL    Urea Nitrogen 54 (H) 7 - 30 mg/dL    Creatinine 1.97 (H) 0.66 - 1.25 mg/dL    GFR Estimate 36 (L) >60 mL/min/1.7m2    GFR Estimate If Black 43 (L) >60 mL/min/1.7m2    Calcium 8.7 8.5 - 10.1 mg/dL    Bilirubin Total 0.3 0.2 - 1.3 mg/dL    Albumin 3.9 3.4 - 5.0 g/dL    Protein Total 6.4 (L) 6.8 - 8.8 g/dL    Alkaline Phosphatase 84 40 - 150 U/L    ALT 16 0 - 70 U/L    AST 17 0 - 45 U/L   ISTAT Basic Met ICa HCT POCT    Collection Time: 06/06/17  4:20 PM   Result Value Ref Range    Sodium 140 133 - 144 mmol/L    Potassium 6.1 (HH) 3.4 - 5.3 mmol/L    Chloride 110 (H) 94 - 109 mmol/L    Total CO2 18 (L) 20 - 32 mmol/L    Anion Gap 12 6 - 17 mmol/L    Glucose 247 (H) 70 - 99 mg/dL    Urea Nitrogen 49 (H) 7 - 30 mg/dL    Creatinine 2.1 (H) 0.66 - 1.25 mg/dL    GFR Estimate 33 (L) >60 mL/min/1.7m2    GFR Estimate If Black 40 (L) >60 mL/min/1.7m2    Calcium Ionized 5.0 4.4 - 5.2 mg/dL    Hemoglobin 9.2 (L) 13.3 - 17.7 g/dL    Hematocrit - POCT 27 (L) 40.0 - 53.0 %PCV   ISTAT Basic Met ICa HCT POCT    Collection Time: 06/06/17  7:29 PM   Result Value Ref Range    Sodium 142 133 - 144 mmol/L    Potassium 5.3 3.4 - 5.3 mmol/L    Chloride 113 (H) 94 - 109 mmol/L    Total CO2 16 (L) 20 - 32 mmol/L    Anion Gap 13 6 - 17 mmol/L    Glucose 175 (H) 70 - 99 mg/dL    Urea Nitrogen 44 (H) 7 - 30 mg/dL    Creatinine 1.8 (H) 0.66 - 1.25 mg/dL    GFR Estimate 40 (L) >60 mL/min/1.7m2     GFR Estimate If Black 48 (L) >60 mL/min/1.7m2    Calcium Ionized 4.9 4.4 - 5.2 mg/dL    Hemoglobin 8.5 (L) 13.3 - 17.7 g/dL    Hematocrit - POCT 25 (L) 40.0 - 53.0 %PCV      My impression is that Mr. Bhagat is doing well 3 months post-transplant.  Certainly, his recovery has been quite remarkable.  I will lower his tacrolimus dose of 5 mg b.i.d. in anticipation that his results will be high.  We will try to maintain his CellCept and be able to use his tacrolimus dose rather than discontinue CellCept.  He does probably have some underlying chronic kidney disease which relates to his acute kidney injury, and I am a bit concerned that he is still anemic and may need an additional anemia evaluation.  I will quantify the protein in his urine, as that was elevated on his urinalysis.  I, otherwise, will not be making any change to his medical regimen.  I will see him back in the clinic again in 6 weeks.      Thank you very much for allowing me to participate in the care of this patient.  If you have any questions regarding my recommendations, please do not hesitate to contact me.       Toñito Camejo MD      Professor of Medicine  [employer]      Executive Medical Director, Solid Organ Transplant Program  [facility]

## 2017-06-09 RX ORDER — OXYCODONE HYDROCHLORIDE 10 MG/1
10 TABLET ORAL DAILY PRN
Qty: 30 TABLET | Refills: 0 | Status: ON HOLD | OUTPATIENT
Start: 2017-06-09 | End: 2017-08-22

## 2017-06-12 ENCOUNTER — OFFICE VISIT (OUTPATIENT)
Dept: TRANSPLANT | Facility: CLINIC | Age: 52
End: 2017-06-12
Attending: TRANSPLANT SURGERY
Payer: COMMERCIAL

## 2017-06-12 VITALS
RESPIRATION RATE: 16 BRPM | DIASTOLIC BLOOD PRESSURE: 92 MMHG | TEMPERATURE: 98.6 F | OXYGEN SATURATION: 96 % | HEART RATE: 84 BPM | SYSTOLIC BLOOD PRESSURE: 175 MMHG

## 2017-06-12 DIAGNOSIS — Z94.4 LIVER TRANSPLANT RECIPIENT (H): Primary | ICD-10-CM

## 2017-06-12 NOTE — MR AVS SNAPSHOT
After Visit Summary   6/12/2017    Camacho Bhagat    MRN: [id number]           Patient Information     Date Of Birth          1965        Visit Information        Provider Department      6/12/2017 11:30 AM Jovan Tran MD [facility] Solid Organ Transplant        Today's Diagnoses     Liver transplant recipient (H)    -  1       Follow-ups after your visit        Your next 10 appointments already scheduled     Jul 28, 2017 11:15 AM CDT   (Arrive by 11:00 AM)   Return General Liver with Toñito Camejo MD   [facility] Hepatology ([facility])    [address] [place] [id number] [phone]            Sep 20, 2017  3:00 PM CDT   (Arrive by 2:30 PM)   New Patient Visit with Tl Higginbotham MD   [facility] Nephrology ([facility])    [address] [place] [id number] [phone]              Who to contact     If you have questions or need follow up information about today's clinic visit or your schedule please contact [facility] SOLID [facility]GAN TRANSPLANT directly at [phone].  Normal or non-critical lab and imaging results will be communicated to you by [employer], letter or phone within 4 business days after the clinic has received the results. If you do not hear from us within 7 days, please contact the clinic through [employer] or phone. If you have a critical or abnormal lab result, we will notify you by phone as soon as possible.  Submit refill requests through [email or web address] or call your pharmacy and they will forward the refill request to us. Please allow 3 business days for your refill to be completed.          Additional Information About Your Visit        MyChart Information     [email or web address] gives you secure access to your electronic health record. If you see a primary care provider, you can also send messages to your care team and make appointments. If you have questions, please call your primary care  clinic.  If you do not have a primary care provider, please call [phone] and they will assist you.        Care EveryWhere ID     This is your Care EveryWhere ID. This could be used by other organizations to access your [facility] medical records  [id number]        Your Vitals Were     Pulse Temperature Respirations Pulse Oximetry          84 98.6  F (37  C) (Oral) 16 96%         Blood Pressure from Last 3 Encounters:   07/17/17 148/76   06/12/17 (!) 175/92   06/07/17 (!) 199/103    Weight from Last 3 Encounters:   07/17/17 93.2 kg (205 lb 7.5 oz)   06/07/17 98 kg (216 lb)   05/11/17 101.7 kg (224 lb 4.8 oz)              Today, you had the following     No orders found for display         Today's Medication Changes          These changes are accurate as of: 6/12/17 11:59 PM.  If you have any questions, ask your nurse or doctor.               These medicines have changed or have updated prescriptions.        Dose/Directions    insulin glargine 100 UNIT/ML injection   Commonly known as:  LANTUS   This may have changed:  how much to take   Used for:  Liver transplant recipient (H)        Dose:  45 Units   Inject 45 Units Subcutaneous every 24 hours   Quantity:  3 mL   Refills:  3                Primary Care Provider Office Phone # Fax #    Shay Kirkpatrick [phone]        [facility] [address] [place] [phone]  [facility] [id number]        Equal Access to Services     FRANKLIN PORTILLO AH: Hadii tavo duttono Sojose, waaxda luqadaha, qaybta kaalmada sapphireegyada, devi duckworth. So [facility] [phone].    ATENCIÓN: Si habla español, tiene a zheng disposición servicios gratuitos de asistencia lingüística. Llame al [phone].    We comply with applicable federal civil rights laws and [place] laws. We do not discriminate on the basis of race, color, national origin, age, disability sex, sexual orientation or gender identity.            Thank you!     Thank you for choosing [facility]  for your care. Our goal is always to provide you with excellent care. Hearing back from our patients is one way we can continue to improve our services. Please take a few minutes to complete the written survey that you may receive in the mail after your visit with us. Thank you!             Your Updated Medication List - Protect others around you: Learn how to safely use, store and throw away your medicines at www.disposemymeds.org.          This list is accurate as of: 6/12/17 11:59 PM.  Always use your most recent med list.                   Brand Name Dispense Instructions for use Diagnosis    aspirin 325 MG tablet     180 tablet    1 tablet (325 mg) by Oral or Feeding Tube route daily    Liver transplant recipient (H)       clotrimazole 10 MG Lozg lozenge    MYCELEX    120 each    Place 1 lozenge (1 Beatrice) inside cheek 4 times daily    Liver transplant recipient (H)       cyclobenzaprine 10 MG tablet    FLEXERIL    90 tablet    Take 1 tablet (10 mg) by mouth 3 times daily as needed for muscle spasms    Immunosuppression (H)       glucagon 1 MG Solr injection     1 each    Inject 1 mg Subcutaneous every 15 minutes as needed for low blood sugar (May repeat x 1 only)    Hypoglycemia       insulin aspart 100 UNIT/ML injection    NovoLOG PEN    3 mL    DOSE:  1 units per 8 grams of carbohydrate. With meals and snacks. Only chart total amount of units given.  Do not give if pre-prandial glucose is less than 60 mg/dL.    Liver transplant recipient (H)       insulin glargine 100 UNIT/ML injection    LANTUS    3 mL    Inject 45 Units Subcutaneous every 24 hours    Liver transplant recipient (H)       LACTOBACILLUS EXTRA STRENGTH Caps     30 capsule    Take 1 capsule by mouth 2 times daily    Aftercare following organ transplant, Status post liver transplantation (H)       lidocaine 5 % Patch    LIDODERM    30 patch    Place 1 patch onto the skin every 24 hours    Low back pain at multiple sites        magnesium oxide 400 MG tablet    MAG-OX    7 tablet    Take 1 tablet (400 mg) by mouth 2 times daily    Liver replaced by transplant (H)       multivitamin, therapeutic with minerals Tabs tablet     30 each    Take 1 tablet by mouth daily    Liver transplant recipient (H)       omeprazole 20 MG CR capsule    priLOSEC    60 capsule    Take 1 capsule (20 mg) by mouth 2 times daily (before meals)    Gastroesophageal reflux disease without esophagitis, History of liver transplant (H), Long-term use of immunosuppressant medication       ondansetron 4 MG ODT tab    ZOFRAN-ODT    30 tablet    Take 1 tablet (4 mg) by mouth every 8 hours as needed for nausea    Cirrhosis of liver with ascites, unspecified hepatic cirrhosis type (H), Nausea       * oxyCODONE 5 MG IR tablet    ROXICODONE    40 tablet    Take 1-2 tablets (5-10 mg) by mouth every 4 hours as needed for moderate to severe pain    Liver transplant recipient (H)       * oxyCODONE 10 MG IR tablet    ROXICODONE    30 tablet    Take 1 tablet (10 mg) by mouth daily as needed for moderate to severe pain    Osteoarthritis       prochlorperazine 5 MG tablet    COMPAZINE    90 tablet    Take 1-2 tablets (5-10 mg) by mouth every 6 hours as needed for nausea or vomiting    Aftercare following organ transplant, Status post liver transplantation (H)       * tadalafil 5 MG tablet    CIALIS    30 tablet    Take 1 tablet (5 mg) by mouth daily Never use with nitroglycerin, terazosin or doxazosin.    Drug-induced erectile dysfunction       * tadalafil 5 MG tablet    CIALIS    30 tablet    Take 1 tablet (5 mg) by mouth daily Never use with nitroglycerin, terazosin or doxazosin.    Drug-induced erectile dysfunction       TRADJENTA PO      Take 5 mg by mouth        valGANciclovir 450 MG tablet    VALCYTE    60 tablet    Take 1 tablet (450 mg) by mouth daily    Liver transplant recipient (H)       * Notice:  This list has 4 medication(s) that are the same as other medications  prescribed for you. Read the directions carefully, and ask your doctor or other care provider to review them with you.

## 2017-06-12 NOTE — NURSING NOTE
Chief Complaint   Patient presents with     Liver Transplant            Initial BP (!) 175/92  Pulse 84  Temp 98.6  F (37  C) (Oral)  Resp 16  SpO2 96% Estimated body mass index is 29.29 kg/(m^2) as calculated from the following:    Height as of 6/7/17: 1.829 m (6').    Weight as of 6/7/17: 98 kg (216 lb).

## 2017-06-14 ENCOUNTER — HOSPITAL ENCOUNTER (OUTPATIENT)
Dept: LAB | Facility: CLINIC | Age: 52
Discharge: HOME | End: 2017-06-14
Attending: TRANSPLANT SURGERY | Admitting: TRANSPLANT SURGERY
Payer: COMMERCIAL

## 2017-06-14 ENCOUNTER — TELEPHONE (OUTPATIENT)
Dept: TRANSPLANT | Facility: CLINIC | Age: 52
End: 2017-06-14

## 2017-06-14 DIAGNOSIS — Z79.60 LONG-TERM USE OF IMMUNOSUPPRESSANT MEDICATION: ICD-10-CM

## 2017-06-14 DIAGNOSIS — Z94.4 LIVER TRANSPLANT RECIPIENT (H): ICD-10-CM

## 2017-06-14 DIAGNOSIS — Z94.4 HISTORY OF LIVER TRANSPLANT (H): Primary | ICD-10-CM

## 2017-06-14 DIAGNOSIS — Z94.4 LIVER REPLACED BY TRANSPLANT (H): ICD-10-CM

## 2017-06-14 LAB
ALBUMIN SERPL-MCNC: 3.8 G/DL (ref 3.4–5)
ALP SERPL-CCNC: 85 U/L (ref 40–150)
ALT SERPL W P-5'-P-CCNC: 19 U/L (ref 0–70)
ANION GAP SERPL CALCULATED.3IONS-SCNC: 7 MMOL/L (ref 3–14)
AST SERPL W P-5'-P-CCNC: 23 U/L (ref 0–45)
BILIRUB DIRECT SERPL-MCNC: 0.2 MG/DL (ref 0–0.2)
BILIRUB SERPL-MCNC: 0.5 MG/DL (ref 0.2–1.3)
BUN SERPL-MCNC: 38 MG/DL (ref 7–30)
CALCIUM SERPL-MCNC: 8.4 MG/DL (ref 8.5–10.1)
CHLORIDE SERPL-SCNC: 110 MMOL/L (ref 94–109)
CO2 SERPL-SCNC: 20 MMOL/L (ref 20–32)
CREAT SERPL-MCNC: 1.33 MG/DL (ref 0.66–1.25)
CREAT UR-MCNC: 117 MG/DL
ERYTHROCYTE [DISTWIDTH] IN BLOOD BY AUTOMATED COUNT: 15.9 % (ref 10–15)
GFR SERPL CREATININE-BSD FRML MDRD: 56 ML/MIN/1.7M2
GLUCOSE SERPL-MCNC: 178 MG/DL (ref 70–99)
HCT VFR BLD AUTO: 26.6 % (ref 40–53)
HGB BLD-MCNC: 8.9 G/DL (ref 13.3–17.7)
MAGNESIUM SERPL-MCNC: 1.9 MG/DL (ref 1.6–2.3)
MCH RBC QN AUTO: 30.2 PG (ref 26.5–33)
MCHC RBC AUTO-ENTMCNC: 33.5 G/DL (ref 31.5–36.5)
MCV RBC AUTO: 90 FL (ref 78–100)
PHOSPHATE SERPL-MCNC: 4.1 MG/DL (ref 2.5–4.5)
PLATELET # BLD AUTO: 80 10E9/L (ref 150–450)
POTASSIUM SERPL-SCNC: 6.3 MMOL/L (ref 3.4–5.3)
PROT SERPL-MCNC: 6.4 G/DL (ref 6.8–8.8)
PROT UR-MCNC: 1.82 G/L
PROT/CREAT 24H UR: 1.55 G/G CR (ref 0–0.2)
RBC # BLD AUTO: 2.95 10E12/L (ref 4.4–5.9)
SODIUM SERPL-SCNC: 137 MMOL/L (ref 133–144)
WBC # BLD AUTO: 1.4 10E9/L (ref 4–11)

## 2017-06-14 PROCEDURE — 84156 ASSAY OF PROTEIN URINE: CPT | Performed by: INTERNAL MEDICINE

## 2017-06-14 PROCEDURE — 80076 HEPATIC FUNCTION PANEL: CPT | Performed by: TRANSPLANT SURGERY

## 2017-06-14 PROCEDURE — 85027 COMPLETE CBC AUTOMATED: CPT | Performed by: TRANSPLANT SURGERY

## 2017-06-14 PROCEDURE — 83735 ASSAY OF MAGNESIUM: CPT | Performed by: TRANSPLANT SURGERY

## 2017-06-14 PROCEDURE — 84100 ASSAY OF PHOSPHORUS: CPT | Performed by: TRANSPLANT SURGERY

## 2017-06-14 PROCEDURE — 80048 BASIC METABOLIC PNL TOTAL CA: CPT | Performed by: TRANSPLANT SURGERY

## 2017-06-14 PROCEDURE — 36415 COLL VENOUS BLD VENIPUNCTURE: CPT | Performed by: TRANSPLANT SURGERY

## 2017-06-14 PROCEDURE — 80197 ASSAY OF TACROLIMUS: CPT | Performed by: TRANSPLANT SURGERY

## 2017-06-14 RX ORDER — FLUDROCORTISONE ACETATE 0.1 MG/1
0.1 TABLET ORAL DAILY
Qty: 30 TABLET | Refills: 3 | Status: ON HOLD | OUTPATIENT
Start: 2017-06-14 | End: 2017-09-08

## 2017-06-14 NOTE — TELEPHONE ENCOUNTER
Message left for pt re:  Elevated K=6.3, results reviewed with .   Plan for pt to start florinef 0.1mg q day.   Requested call back from Camacho to review labs further as WBC 1.4

## 2017-06-15 LAB
TACROLIMUS BLD-MCNC: 3.4 UG/L (ref 5–15)
TME LAST DOSE: ABNORMAL H

## 2017-06-15 RX ORDER — SULFAMETHOXAZOLE AND TRIMETHOPRIM 400; 80 MG/1; MG/1
TABLET ORAL
Qty: 8 TABLET | Refills: 3 | Status: ON HOLD | OUTPATIENT
Start: 2017-06-15 | End: 2017-09-08

## 2017-06-15 RX ORDER — MYCOPHENOLATE MOFETIL 250 MG/1
500 CAPSULE ORAL EVERY 12 HOURS
Qty: 120 CAPSULE | Refills: 3 | Status: SHIPPED | OUTPATIENT
Start: 2017-06-15 | End: 2017-06-20

## 2017-06-15 NOTE — TELEPHONE ENCOUNTER
Spoke with Camacho,  He will  the florinef and start it today.  Reviewed labs with him, K and WBC level of 1.4.  Reviewed causes of low WBC, medications.  He reports that he doesn't feel ill, he has had a cold that has lasted about 6 weeks.     Camacho is 3months post transplant     Plan to start taper off cellcept, decrease Bactrim to 2x/week, continue valcyte for CMV prophylaxis.   Reviewed these medication changes with Camacho, he verbalized understanding of dose change.

## 2017-06-15 NOTE — TELEPHONE ENCOUNTER
Spoke with Camacho about results of tacrolimus level 3.4,  Per Camacho he took his tacrolimus at 10am,   Level drawn at 2pm the next day, so was closer to a 16 hour level.  Plan made with Camacho to do the repeat labs on Monday, and do 12 hour tacrolimus level.

## 2017-06-19 ENCOUNTER — HOSPITAL ENCOUNTER (OUTPATIENT)
Dept: LAB | Facility: CLINIC | Age: 52
Discharge: HOME | End: 2017-06-19
Attending: INTERNAL MEDICINE | Admitting: INTERNAL MEDICINE
Payer: COMMERCIAL

## 2017-06-19 DIAGNOSIS — Z94.4 LIVER TRANSPLANT RECIPIENT (H): ICD-10-CM

## 2017-06-19 DIAGNOSIS — Z94.4 HISTORY OF LIVER TRANSPLANT (H): ICD-10-CM

## 2017-06-19 DIAGNOSIS — Z79.60 LONG-TERM USE OF IMMUNOSUPPRESSANT MEDICATION: ICD-10-CM

## 2017-06-19 LAB
ALBUMIN SERPL-MCNC: 3.6 G/DL (ref 3.4–5)
ALP SERPL-CCNC: 98 U/L (ref 40–150)
ALT SERPL W P-5'-P-CCNC: 18 U/L (ref 0–70)
ANION GAP SERPL CALCULATED.3IONS-SCNC: 5 MMOL/L (ref 3–14)
AST SERPL W P-5'-P-CCNC: 20 U/L (ref 0–45)
BASOPHILS # BLD AUTO: 0 10E9/L (ref 0–0.2)
BASOPHILS NFR BLD AUTO: 2 %
BILIRUB DIRECT SERPL-MCNC: 0.2 MG/DL (ref 0–0.2)
BILIRUB SERPL-MCNC: 0.5 MG/DL (ref 0.2–1.3)
BUN SERPL-MCNC: 39 MG/DL (ref 7–30)
CALCIUM SERPL-MCNC: 8.5 MG/DL (ref 8.5–10.1)
CHLORIDE SERPL-SCNC: 111 MMOL/L (ref 94–109)
CO2 SERPL-SCNC: 21 MMOL/L (ref 20–32)
CREAT SERPL-MCNC: 1.46 MG/DL (ref 0.66–1.25)
DIFFERENTIAL METHOD BLD: ABNORMAL
EOSINOPHIL # BLD AUTO: 0.1 10E9/L (ref 0–0.7)
EOSINOPHIL NFR BLD AUTO: 7 %
ERYTHROCYTE [DISTWIDTH] IN BLOOD BY AUTOMATED COUNT: 15.3 % (ref 10–15)
GFR SERPL CREATININE-BSD FRML MDRD: 51 ML/MIN/1.7M2
GLUCOSE SERPL-MCNC: 241 MG/DL (ref 70–99)
HCT VFR BLD AUTO: 25.1 % (ref 40–53)
HGB BLD-MCNC: 8.3 G/DL (ref 13.3–17.7)
LYMPHOCYTES # BLD AUTO: 0.1 10E9/L (ref 0.8–5.3)
LYMPHOCYTES NFR BLD AUTO: 10 %
MAGNESIUM SERPL-MCNC: 2.2 MG/DL (ref 1.6–2.3)
MCH RBC QN AUTO: 30 PG (ref 26.5–33)
MCHC RBC AUTO-ENTMCNC: 33.1 G/DL (ref 31.5–36.5)
MCV RBC AUTO: 91 FL (ref 78–100)
METAMYELOCYTES # BLD: 0 10E9/L
METAMYELOCYTES NFR BLD MANUAL: 1 %
MONOCYTES # BLD AUTO: 0 10E9/L (ref 0–1.3)
MONOCYTES NFR BLD AUTO: 3 %
NEUTROPHILS # BLD AUTO: 0.8 10E9/L (ref 1.6–8.3)
NEUTROPHILS NFR BLD AUTO: 77 %
PHOSPHATE SERPL-MCNC: 3.7 MG/DL (ref 2.5–4.5)
PLATELET # BLD AUTO: 78 10E9/L (ref 150–450)
PLATELET # BLD EST: ABNORMAL 10*3/UL
POTASSIUM SERPL-SCNC: 5.9 MMOL/L (ref 3.4–5.3)
PROT SERPL-MCNC: 6.3 G/DL (ref 6.8–8.8)
RBC # BLD AUTO: 2.77 10E12/L (ref 4.4–5.9)
RBC MORPH BLD: ABNORMAL
SODIUM SERPL-SCNC: 137 MMOL/L (ref 133–144)
TACROLIMUS BLD-MCNC: 10.4 UG/L (ref 5–15)
TME LAST DOSE: NORMAL H
WBC # BLD AUTO: 1 10E9/L (ref 4–11)

## 2017-06-19 PROCEDURE — 80076 HEPATIC FUNCTION PANEL: CPT | Performed by: INTERNAL MEDICINE

## 2017-06-19 PROCEDURE — 83735 ASSAY OF MAGNESIUM: CPT | Performed by: INTERNAL MEDICINE

## 2017-06-19 PROCEDURE — 36415 COLL VENOUS BLD VENIPUNCTURE: CPT | Performed by: INTERNAL MEDICINE

## 2017-06-19 PROCEDURE — 84100 ASSAY OF PHOSPHORUS: CPT | Performed by: INTERNAL MEDICINE

## 2017-06-19 PROCEDURE — 85004 AUTOMATED DIFF WBC COUNT: CPT | Performed by: INTERNAL MEDICINE

## 2017-06-19 PROCEDURE — 80048 BASIC METABOLIC PNL TOTAL CA: CPT | Performed by: INTERNAL MEDICINE

## 2017-06-19 PROCEDURE — 80197 ASSAY OF TACROLIMUS: CPT | Performed by: INTERNAL MEDICINE

## 2017-06-19 PROCEDURE — 85027 COMPLETE CBC AUTOMATED: CPT | Performed by: INTERNAL MEDICINE

## 2017-06-20 DIAGNOSIS — Z94.4 LIVER TRANSPLANT RECIPIENT (H): ICD-10-CM

## 2017-06-20 DIAGNOSIS — Z94.4 LIVER REPLACED BY TRANSPLANT (H): Primary | ICD-10-CM

## 2017-06-20 RX ORDER — MYCOPHENOLATE MOFETIL 250 MG/1
250 CAPSULE ORAL EVERY 12 HOURS
Qty: 180 CAPSULE | Refills: 3 | Status: ON HOLD | OUTPATIENT
Start: 2017-06-20 | End: 2017-09-08

## 2017-06-20 RX ORDER — TACROLIMUS 1 MG/1
CAPSULE ORAL
Qty: 270 CAPSULE | Refills: 11 | Status: SHIPPED | OUTPATIENT
Start: 2017-06-20 | End: 2017-06-23

## 2017-06-20 NOTE — TELEPHONE ENCOUNTER
Called patient regarding lab results of low WBC and slightly elevated tac level. Pt reports he is feeling well aside from a lingering cold. WBC 1.0, ANC 0.8. Instructed pt to decrease cellcept to 250mg BID and hold valcyte. Prograf decreased from 5mg BID to 5mg QAM and 4mg QPM. Instructed pt to have labs drawn again on Monday. CMV ordered to be checked. Pt to call coordinator on Monday to f/u on labs and re-address med changes. Per Dr aragon pt should also f/u with nephrology.

## 2017-06-22 NOTE — PROGRESS NOTES
Subjective:    HPI  Oswestry Score: 33.33 %                 Objective:    System    Physical Exam    General     ROS    Assessment/Plan:      DISCHARGE REPORT    Progress reporting period is from 5/3/2017 to 6/22/2017.       SUBJECTIVE  Patient's current status is unknown.  He did not complete therapy as planned.  Patient has not returned since the initial evaluation and treatment.    OBJECTIVE  Changes noted in objective findings:  Patient has failed to return to therapy so current objective findings are unknown.        ASSESSMENT/PLAN  Updated problem list and treatment plan: Diagnosis 1:  Neck and Back Pain    Assessment of Progress: The patient has not returned to therapy. Current status is unknown.      D/C PT since Camacho has not returned.

## 2017-06-23 ENCOUNTER — TELEPHONE (OUTPATIENT)
Dept: TRANSPLANT | Facility: CLINIC | Age: 52
End: 2017-06-23

## 2017-06-23 DIAGNOSIS — Z79.60 LONG-TERM USE OF IMMUNOSUPPRESSANT MEDICATION: ICD-10-CM

## 2017-06-23 DIAGNOSIS — Z94.4 LIVER TRANSPLANT RECIPIENT (H): Primary | ICD-10-CM

## 2017-06-23 RX ORDER — TACROLIMUS 1 MG/1
CAPSULE ORAL
Qty: 210 CAPSULE | Refills: 11 | Status: ON HOLD | OUTPATIENT
Start: 2017-06-23 | End: 2017-09-08

## 2017-06-23 NOTE — Clinical Note
Elevated BP w/florinef to 170's/90.    Pt on cellcept taper,  Should he stay on cellcept, with lower level tacro for creat, and potassium elevations?    He is >3months post.

## 2017-06-23 NOTE — TELEPHONE ENCOUNTER
Camacho reports that his BP has been higher since he has been on the florinef, in the 170/90's.   Reviewed medications with Camacho, plan for change in his tacrolimus dose, reduced to 4mg in the am, 3mg in the pm.  Plan made with Camacho for labs repeated on Monday, and will report BP issue to .

## 2017-06-26 ENCOUNTER — TELEPHONE (OUTPATIENT)
Dept: TRANSPLANT | Facility: CLINIC | Age: 52
End: 2017-06-26

## 2017-06-26 ENCOUNTER — HOSPITAL ENCOUNTER (OUTPATIENT)
Dept: LAB | Facility: CLINIC | Age: 52
Discharge: HOME | End: 2017-06-26
Attending: INTERNAL MEDICINE | Admitting: INTERNAL MEDICINE
Payer: COMMERCIAL

## 2017-06-26 DIAGNOSIS — Z94.4 HISTORY OF LIVER TRANSPLANT (H): Primary | ICD-10-CM

## 2017-06-26 DIAGNOSIS — Z79.60 LONG-TERM USE OF IMMUNOSUPPRESSANT MEDICATION: ICD-10-CM

## 2017-06-26 DIAGNOSIS — D72.819 DECREASED WHITE BLOOD CELL COUNT: ICD-10-CM

## 2017-06-26 DIAGNOSIS — Z94.4 HISTORY OF LIVER TRANSPLANT (H): ICD-10-CM

## 2017-06-26 DIAGNOSIS — M54.50 LOW BACK PAIN AT MULTIPLE SITES: ICD-10-CM

## 2017-06-26 DIAGNOSIS — Z94.4 LIVER TRANSPLANT RECIPIENT (H): ICD-10-CM

## 2017-06-26 DIAGNOSIS — R03.0 ELEVATED BLOOD PRESSURE READING: ICD-10-CM

## 2017-06-26 DIAGNOSIS — Z94.4 LIVER REPLACED BY TRANSPLANT (H): ICD-10-CM

## 2017-06-26 LAB
ALBUMIN SERPL-MCNC: 3.3 G/DL (ref 3.4–5)
ALP SERPL-CCNC: 95 U/L (ref 40–150)
ALT SERPL W P-5'-P-CCNC: 22 U/L (ref 0–70)
ANION GAP SERPL CALCULATED.3IONS-SCNC: 5 MMOL/L (ref 3–14)
AST SERPL W P-5'-P-CCNC: 26 U/L (ref 0–45)
BASOPHILS # BLD AUTO: 0 10E9/L (ref 0–0.2)
BASOPHILS NFR BLD AUTO: 2 %
BILIRUB DIRECT SERPL-MCNC: 0.2 MG/DL (ref 0–0.2)
BILIRUB SERPL-MCNC: 0.4 MG/DL (ref 0.2–1.3)
BUN SERPL-MCNC: 38 MG/DL (ref 7–30)
CALCIUM SERPL-MCNC: 8.2 MG/DL (ref 8.5–10.1)
CHLORIDE SERPL-SCNC: 111 MMOL/L (ref 94–109)
CO2 SERPL-SCNC: 24 MMOL/L (ref 20–32)
CREAT SERPL-MCNC: 1.55 MG/DL (ref 0.66–1.25)
DIFFERENTIAL METHOD BLD: ABNORMAL
EOSINOPHIL # BLD AUTO: 0 10E9/L (ref 0–0.7)
EOSINOPHIL NFR BLD AUTO: 5 %
ERYTHROCYTE [DISTWIDTH] IN BLOOD BY AUTOMATED COUNT: 15.2 % (ref 10–15)
GFR SERPL CREATININE-BSD FRML MDRD: 47 ML/MIN/1.7M2
GLUCOSE SERPL-MCNC: 122 MG/DL (ref 70–99)
HCT VFR BLD AUTO: 24.2 % (ref 40–53)
HGB BLD-MCNC: 8 G/DL (ref 13.3–17.7)
LYMPHOCYTES # BLD AUTO: 0.2 10E9/L (ref 0.8–5.3)
LYMPHOCYTES NFR BLD AUTO: 25 %
MAGNESIUM SERPL-MCNC: 2.3 MG/DL (ref 1.6–2.3)
MCH RBC QN AUTO: 29.9 PG (ref 26.5–33)
MCHC RBC AUTO-ENTMCNC: 33.1 G/DL (ref 31.5–36.5)
MCV RBC AUTO: 90 FL (ref 78–100)
METAMYELOCYTES # BLD: 0 10E9/L
METAMYELOCYTES NFR BLD MANUAL: 6 %
MONOCYTES # BLD AUTO: 0 10E9/L (ref 0–1.3)
MONOCYTES NFR BLD AUTO: 0 %
NEUTROPHILS # BLD AUTO: 0.5 10E9/L (ref 1.6–8.3)
NEUTROPHILS NFR BLD AUTO: 62 %
PHOSPHATE SERPL-MCNC: 4 MG/DL (ref 2.5–4.5)
PLATELET # BLD AUTO: 70 10E9/L (ref 150–450)
PLATELET # BLD EST: ABNORMAL 10*3/UL
POTASSIUM SERPL-SCNC: 6.3 MMOL/L (ref 3.4–5.3)
PROT SERPL-MCNC: 5.8 G/DL (ref 6.8–8.8)
RBC # BLD AUTO: 2.68 10E12/L (ref 4.4–5.9)
RBC MORPH BLD: ABNORMAL
SODIUM SERPL-SCNC: 140 MMOL/L (ref 133–144)
TACROLIMUS BLD-MCNC: 8.9 UG/L (ref 5–15)
TME LAST DOSE: NORMAL H
WBC # BLD AUTO: 0.8 10E9/L (ref 4–11)

## 2017-06-26 PROCEDURE — 85004 AUTOMATED DIFF WBC COUNT: CPT | Performed by: TRANSPLANT SURGERY

## 2017-06-26 PROCEDURE — 84100 ASSAY OF PHOSPHORUS: CPT | Performed by: TRANSPLANT SURGERY

## 2017-06-26 PROCEDURE — 83735 ASSAY OF MAGNESIUM: CPT | Performed by: TRANSPLANT SURGERY

## 2017-06-26 PROCEDURE — 85004 AUTOMATED DIFF WBC COUNT: CPT | Performed by: INTERNAL MEDICINE

## 2017-06-26 PROCEDURE — 80197 ASSAY OF TACROLIMUS: CPT | Performed by: TRANSPLANT SURGERY

## 2017-06-26 PROCEDURE — 36415 COLL VENOUS BLD VENIPUNCTURE: CPT | Performed by: TRANSPLANT SURGERY

## 2017-06-26 PROCEDURE — 85027 COMPLETE CBC AUTOMATED: CPT | Performed by: TRANSPLANT SURGERY

## 2017-06-26 PROCEDURE — 80076 HEPATIC FUNCTION PANEL: CPT | Performed by: TRANSPLANT SURGERY

## 2017-06-26 PROCEDURE — 80048 BASIC METABOLIC PNL TOTAL CA: CPT | Performed by: TRANSPLANT SURGERY

## 2017-06-26 RX ORDER — AMLODIPINE BESYLATE 5 MG/1
5 TABLET ORAL DAILY
Qty: 90 TABLET | Refills: 3 | Status: ON HOLD | OUTPATIENT
Start: 2017-06-26 | End: 2017-09-08

## 2017-06-26 NOTE — TELEPHONE ENCOUNTER
Per :   Please have Camacho start norvasc 5mg q day for elevated BP starting today.   Ask Camacho to take his BP daily to monitor effect of BP medication.    Please review labs done today with Camacho,  His potassium is 6.3,  And WBC is low 0.8.  He should still be taking the florinef daily.  Check if feeling okay,  Instruct him to stop taking cellcept and bactrim today, these can cause lowering of WBC.    Let him know that his lab results will be reviewed with  today.

## 2017-06-26 NOTE — TELEPHONE ENCOUNTER
"Spoke with pt to advise him of recent labs. Pt states he \"feels great\" and denies any GI symptoms or night sweats. Advised pt Dr. Camejo wants him to start Norvasc 5 mg once a day, will send to pt's preferred pharmacy. Pt confirmed he has a BP cuff at home and will continue checking his BP daily. Also advised pt to stop Cellcept and Bactrim and to get labs rechecked tomorrow. Pt verbalized understanding of all of the above and had no questions at this time.  "

## 2017-06-27 ENCOUNTER — TELEPHONE (OUTPATIENT)
Dept: TRANSPLANT | Facility: CLINIC | Age: 52
End: 2017-06-27

## 2017-06-27 ENCOUNTER — HOSPITAL ENCOUNTER (OUTPATIENT)
Dept: LAB | Facility: CLINIC | Age: 52
Discharge: HOME | End: 2017-06-27
Attending: INTERNAL MEDICINE | Admitting: INTERNAL MEDICINE
Payer: COMMERCIAL

## 2017-06-27 DIAGNOSIS — Z94.4 HISTORY OF LIVER TRANSPLANT (H): ICD-10-CM

## 2017-06-27 DIAGNOSIS — Z79.60 LONG-TERM USE OF IMMUNOSUPPRESSANT MEDICATION: ICD-10-CM

## 2017-06-27 DIAGNOSIS — D72.819 DECREASED WHITE BLOOD CELL COUNT: ICD-10-CM

## 2017-06-27 LAB
ALBUMIN SERPL-MCNC: 3.3 G/DL (ref 3.4–5)
ALP SERPL-CCNC: 93 U/L (ref 40–150)
ALT SERPL W P-5'-P-CCNC: 25 U/L (ref 0–70)
ANION GAP SERPL CALCULATED.3IONS-SCNC: 4 MMOL/L (ref 3–14)
ANISOCYTOSIS BLD QL SMEAR: SLIGHT
AST SERPL W P-5'-P-CCNC: 33 U/L (ref 0–45)
BASOPHILS # BLD AUTO: 0 10E9/L (ref 0–0.2)
BASOPHILS NFR BLD AUTO: 0 %
BILIRUB DIRECT SERPL-MCNC: 0.1 MG/DL (ref 0–0.2)
BILIRUB SERPL-MCNC: 0.3 MG/DL (ref 0.2–1.3)
BUN SERPL-MCNC: 36 MG/DL (ref 7–30)
CALCIUM SERPL-MCNC: 8 MG/DL (ref 8.5–10.1)
CHLORIDE SERPL-SCNC: 110 MMOL/L (ref 94–109)
CMV DNA SPEC NAA+PROBE-ACNC: NORMAL [IU]/ML
CMV DNA SPEC NAA+PROBE-LOG#: NORMAL {LOG_IU}/ML
CO2 SERPL-SCNC: 23 MMOL/L (ref 20–32)
CREAT SERPL-MCNC: 1.41 MG/DL (ref 0.66–1.25)
DIFFERENTIAL METHOD BLD: ABNORMAL
EOSINOPHIL # BLD AUTO: 0 10E9/L (ref 0–0.7)
EOSINOPHIL NFR BLD AUTO: 3 %
ERYTHROCYTE [DISTWIDTH] IN BLOOD BY AUTOMATED COUNT: 15 % (ref 10–15)
GFR SERPL CREATININE-BSD FRML MDRD: 53 ML/MIN/1.7M2
GLUCOSE SERPL-MCNC: 166 MG/DL (ref 70–99)
HCT VFR BLD AUTO: 23 % (ref 40–53)
HGB BLD-MCNC: 7.7 G/DL (ref 13.3–17.7)
LYMPHOCYTES # BLD AUTO: 0.1 10E9/L (ref 0.8–5.3)
LYMPHOCYTES NFR BLD AUTO: 21 %
MAGNESIUM SERPL-MCNC: 2.1 MG/DL (ref 1.6–2.3)
MCH RBC QN AUTO: 30.4 PG (ref 26.5–33)
MCHC RBC AUTO-ENTMCNC: 33.5 G/DL (ref 31.5–36.5)
MCV RBC AUTO: 91 FL (ref 78–100)
METAMYELOCYTES # BLD: 0 10E9/L
METAMYELOCYTES NFR BLD MANUAL: 6 %
MICROCYTES BLD QL SMEAR: PRESENT
MONOCYTES # BLD AUTO: 0.1 10E9/L (ref 0–1.3)
MONOCYTES NFR BLD AUTO: 12 %
MYELOCYTES # BLD: 0 10E9/L
MYELOCYTES NFR BLD MANUAL: 3 %
NEUTROPHILS # BLD AUTO: 0.4 10E9/L (ref 1.6–8.3)
NEUTROPHILS NFR BLD AUTO: 55 %
PHOSPHATE SERPL-MCNC: 3.9 MG/DL (ref 2.5–4.5)
PLATELET # BLD AUTO: 68 10E9/L (ref 150–450)
PLATELET # BLD EST: ABNORMAL 10*3/UL
POTASSIUM SERPL-SCNC: 5.7 MMOL/L (ref 3.4–5.3)
PROT SERPL-MCNC: 5.7 G/DL (ref 6.8–8.8)
RBC # BLD AUTO: 2.53 10E12/L (ref 4.4–5.9)
SODIUM SERPL-SCNC: 137 MMOL/L (ref 133–144)
SPECIMEN SOURCE: NORMAL
TACROLIMUS BLD-MCNC: 8.5 UG/L (ref 5–15)
TME LAST DOSE: NORMAL H
WBC # BLD AUTO: 0.7 10E9/L (ref 4–11)

## 2017-06-27 PROCEDURE — 85004 AUTOMATED DIFF WBC COUNT: CPT | Performed by: INTERNAL MEDICINE

## 2017-06-27 PROCEDURE — 83735 ASSAY OF MAGNESIUM: CPT | Performed by: INTERNAL MEDICINE

## 2017-06-27 PROCEDURE — 84100 ASSAY OF PHOSPHORUS: CPT | Performed by: INTERNAL MEDICINE

## 2017-06-27 PROCEDURE — 85027 COMPLETE CBC AUTOMATED: CPT | Performed by: INTERNAL MEDICINE

## 2017-06-27 PROCEDURE — 80197 ASSAY OF TACROLIMUS: CPT | Performed by: INTERNAL MEDICINE

## 2017-06-27 PROCEDURE — 80076 HEPATIC FUNCTION PANEL: CPT | Performed by: INTERNAL MEDICINE

## 2017-06-27 PROCEDURE — 80048 BASIC METABOLIC PNL TOTAL CA: CPT | Performed by: INTERNAL MEDICINE

## 2017-06-27 PROCEDURE — 36415 COLL VENOUS BLD VENIPUNCTURE: CPT | Performed by: INTERNAL MEDICINE

## 2017-06-27 RX ORDER — GABAPENTIN 300 MG/1
300 CAPSULE ORAL 3 TIMES DAILY
Qty: 270 CAPSULE | Refills: 3 | Status: ON HOLD | OUTPATIENT
Start: 2017-06-27 | End: 2017-09-08

## 2017-06-27 NOTE — TELEPHONE ENCOUNTER
DATE:  6/27/2017   TIME OF RECEIPT FROM LAB:  1:08 pm  LAB TEST:  Abs Neutrophil, WBC  LAB VALUE:  0.4,   0.7  RESULTS GIVEN WITH READ-BACK TO (PROVIDER):  Abril Doty RN  TIME LAB VALUE REPORTED TO PROVIDER:   1:15pm  (Phone) (Epic)

## 2017-06-30 DIAGNOSIS — D72.819 DECREASED WHITE BLOOD CELL COUNT: ICD-10-CM

## 2017-06-30 DIAGNOSIS — Z94.4 HISTORY OF LIVER TRANSPLANT (H): Primary | ICD-10-CM

## 2017-06-30 DIAGNOSIS — Z94.4 HISTORY OF LIVER TRANSPLANT (H): ICD-10-CM

## 2017-06-30 DIAGNOSIS — Z79.60 LONG-TERM USE OF IMMUNOSUPPRESSANT MEDICATION: ICD-10-CM

## 2017-06-30 LAB
ALBUMIN SERPL-MCNC: 3.4 G/DL (ref 3.4–5)
ALP SERPL-CCNC: 106 U/L (ref 40–150)
ALT SERPL W P-5'-P-CCNC: 26 U/L (ref 0–70)
ANION GAP SERPL CALCULATED.3IONS-SCNC: 7 MMOL/L (ref 3–14)
AST SERPL W P-5'-P-CCNC: 29 U/L (ref 0–45)
BASOPHILS # BLD AUTO: 0 10E9/L (ref 0–0.2)
BASOPHILS NFR BLD AUTO: 0.9 %
BILIRUB DIRECT SERPL-MCNC: 0.1 MG/DL (ref 0–0.2)
BILIRUB SERPL-MCNC: 0.4 MG/DL (ref 0.2–1.3)
BUN SERPL-MCNC: 32 MG/DL (ref 7–30)
CALCIUM SERPL-MCNC: 8.7 MG/DL (ref 8.5–10.1)
CHLORIDE SERPL-SCNC: 113 MMOL/L (ref 94–109)
CO2 SERPL-SCNC: 22 MMOL/L (ref 20–32)
CREAT SERPL-MCNC: 1.15 MG/DL (ref 0.66–1.25)
DIFFERENTIAL METHOD BLD: ABNORMAL
EOSINOPHIL # BLD AUTO: 0.1 10E9/L (ref 0–0.7)
EOSINOPHIL NFR BLD AUTO: 9.2 %
ERYTHROCYTE [DISTWIDTH] IN BLOOD BY AUTOMATED COUNT: 14.4 % (ref 10–15)
GFR SERPL CREATININE-BSD FRML MDRD: 67 ML/MIN/1.7M2
GLUCOSE SERPL-MCNC: 184 MG/DL (ref 70–99)
HCT VFR BLD AUTO: 26 % (ref 40–53)
HGB BLD-MCNC: 8.4 G/DL (ref 13.3–17.7)
LYMPHOCYTES # BLD AUTO: 0.2 10E9/L (ref 0.8–5.3)
LYMPHOCYTES NFR BLD AUTO: 25.7 %
MAGNESIUM SERPL-MCNC: 2 MG/DL (ref 1.6–2.3)
MCH RBC QN AUTO: 29 PG (ref 26.5–33)
MCHC RBC AUTO-ENTMCNC: 32.3 G/DL (ref 31.5–36.5)
MCV RBC AUTO: 90 FL (ref 78–100)
METAMYELOCYTES # BLD: 0 10E9/L
METAMYELOCYTES NFR BLD MANUAL: 1.8 %
MONOCYTES # BLD AUTO: 0 10E9/L (ref 0–1.3)
MONOCYTES NFR BLD AUTO: 5.5 %
MYELOCYTES # BLD: 0 10E9/L
MYELOCYTES NFR BLD MANUAL: 2.8 %
NEUTROPHILS # BLD AUTO: 0.4 10E9/L (ref 1.6–8.3)
NEUTROPHILS NFR BLD AUTO: 54.1 %
OVALOCYTES BLD QL SMEAR: SLIGHT
PHOSPHATE SERPL-MCNC: 4 MG/DL (ref 2.5–4.5)
PLATELET # BLD AUTO: 70 10E9/L (ref 150–450)
POIKILOCYTOSIS BLD QL SMEAR: SLIGHT
POTASSIUM SERPL-SCNC: 6 MMOL/L (ref 3.4–5.3)
PROT SERPL-MCNC: 6.4 G/DL (ref 6.8–8.8)
RBC # BLD AUTO: 2.9 10E12/L (ref 4.4–5.9)
SODIUM SERPL-SCNC: 142 MMOL/L (ref 133–144)
TACROLIMUS BLD-MCNC: 7.1 UG/L (ref 5–15)
TME LAST DOSE: NORMAL H
WBC # BLD AUTO: 0.7 10E9/L (ref 4–11)

## 2017-06-30 NOTE — PROGRESS NOTES
Per review by  of WBC diff, elevated potassium, no changes to be made.  Recheck labs on Monday 7/03.    Message left for Camacho on his cell phone to continue holding his bactrim, cellcept, and valcyte until WBC recovers.

## 2017-07-03 ENCOUNTER — TELEPHONE (OUTPATIENT)
Dept: TRANSPLANT | Facility: CLINIC | Age: 52
End: 2017-07-03

## 2017-07-03 DIAGNOSIS — Z79.60 LONG-TERM USE OF IMMUNOSUPPRESSANT MEDICATION: ICD-10-CM

## 2017-07-03 DIAGNOSIS — D72.819 DECREASED WHITE BLOOD CELL COUNT: ICD-10-CM

## 2017-07-03 DIAGNOSIS — Z94.4 HISTORY OF LIVER TRANSPLANT (H): ICD-10-CM

## 2017-07-03 LAB
ALBUMIN SERPL-MCNC: 3.4 G/DL (ref 3.4–5)
ALP SERPL-CCNC: 104 U/L (ref 40–150)
ALT SERPL W P-5'-P-CCNC: 26 U/L (ref 0–70)
ANION GAP SERPL CALCULATED.3IONS-SCNC: 6 MMOL/L (ref 3–14)
AST SERPL W P-5'-P-CCNC: 34 U/L (ref 0–45)
BASOPHILS # BLD AUTO: 0 10E9/L (ref 0–0.2)
BASOPHILS NFR BLD AUTO: 0.5 %
BILIRUB DIRECT SERPL-MCNC: 0.2 MG/DL (ref 0–0.2)
BILIRUB SERPL-MCNC: 0.5 MG/DL (ref 0.2–1.3)
BUN SERPL-MCNC: 32 MG/DL (ref 7–30)
CALCIUM SERPL-MCNC: 8.3 MG/DL (ref 8.5–10.1)
CHLORIDE SERPL-SCNC: 108 MMOL/L (ref 94–109)
CO2 SERPL-SCNC: 23 MMOL/L (ref 20–32)
CREAT SERPL-MCNC: 1.12 MG/DL (ref 0.66–1.25)
DIFFERENTIAL METHOD BLD: ABNORMAL
EOSINOPHIL # BLD AUTO: 0 10E9/L (ref 0–0.7)
EOSINOPHIL NFR BLD AUTO: 5.4 %
ERYTHROCYTE [DISTWIDTH] IN BLOOD BY AUTOMATED COUNT: 14.5 % (ref 10–15)
GFR SERPL CREATININE-BSD FRML MDRD: 69 ML/MIN/1.7M2
GLUCOSE SERPL-MCNC: 212 MG/DL (ref 70–99)
HCT VFR BLD AUTO: 27.4 % (ref 40–53)
HGB BLD-MCNC: 9.3 G/DL (ref 13.3–17.7)
LYMPHOCYTES # BLD AUTO: 0.2 10E9/L (ref 0.8–5.3)
LYMPHOCYTES NFR BLD AUTO: 30.5 %
MAGNESIUM SERPL-MCNC: 1.9 MG/DL (ref 1.6–2.3)
MCH RBC QN AUTO: 29.7 PG (ref 26.5–33)
MCHC RBC AUTO-ENTMCNC: 33.9 G/DL (ref 31.5–36.5)
MCV RBC AUTO: 88 FL (ref 78–100)
MONOCYTES # BLD AUTO: 0 10E9/L (ref 0–1.3)
MONOCYTES NFR BLD AUTO: 4.8 %
MYELOCYTES # BLD: 0 10E9/L
MYELOCYTES NFR BLD MANUAL: 0.5 %
NEUTROPHILS # BLD AUTO: 0.5 10E9/L (ref 1.6–8.3)
NEUTROPHILS NFR BLD AUTO: 58.3 %
PHOSPHATE SERPL-MCNC: 3.7 MG/DL (ref 2.5–4.5)
PLATELET # BLD AUTO: 71 10E9/L (ref 150–450)
POIKILOCYTOSIS BLD QL SMEAR: SLIGHT
POTASSIUM SERPL-SCNC: 5.4 MMOL/L (ref 3.4–5.3)
PROT SERPL-MCNC: 6.2 G/DL (ref 6.8–8.8)
RBC # BLD AUTO: 3.13 10E12/L (ref 4.4–5.9)
SODIUM SERPL-SCNC: 136 MMOL/L (ref 133–144)
TACROLIMUS BLD-MCNC: 9.9 UG/L (ref 5–15)
TME LAST DOSE: NORMAL H
WBC # BLD AUTO: 0.8 10E9/L (ref 4–11)

## 2017-07-03 NOTE — TELEPHONE ENCOUNTER
Spoke with Camacho, labs slightly improved.  Pt states feels okay.   Plan repeat labs 1 week. CMV pending.  Instructed pt to call back with any changes, fever, or other symptoms.

## 2017-07-04 ENCOUNTER — ANESTHESIA EVENT (OUTPATIENT)
Dept: SURGERY | Facility: CLINIC | Age: 52
End: 2017-07-04
Payer: COMMERCIAL

## 2017-07-04 ENCOUNTER — APPOINTMENT (OUTPATIENT)
Dept: GENERAL RADIOLOGY | Facility: CLINIC | Age: 52
End: 2017-07-04
Attending: SURGERY
Payer: COMMERCIAL

## 2017-07-04 ENCOUNTER — TRANSFERRED RECORDS (OUTPATIENT)
Dept: HEALTH INFORMATION MANAGEMENT | Facility: CLINIC | Age: 52
End: 2017-07-04

## 2017-07-04 ENCOUNTER — HOSPITAL ENCOUNTER (INPATIENT)
Facility: CLINIC | Age: 52
LOS: 66 days | Discharge: HOME | End: 2017-09-08
Attending: TRANSPLANT SURGERY | Admitting: TRANSPLANT SURGERY
Payer: COMMERCIAL

## 2017-07-04 ENCOUNTER — APPOINTMENT (OUTPATIENT)
Dept: GENERAL RADIOLOGY | Facility: CLINIC | Age: 52
End: 2017-07-04
Attending: TRANSPLANT SURGERY
Payer: COMMERCIAL

## 2017-07-04 ENCOUNTER — ANESTHESIA (OUTPATIENT)
Dept: SURGERY | Facility: CLINIC | Age: 52
End: 2017-07-04
Payer: COMMERCIAL

## 2017-07-04 DIAGNOSIS — I99.8 ISCHEMIA OF BOTH LOWER EXTREMITIES: ICD-10-CM

## 2017-07-04 DIAGNOSIS — R41.0 DELIRIUM: ICD-10-CM

## 2017-07-04 DIAGNOSIS — D84.9 IMMUNOSUPPRESSED STATUS (H): ICD-10-CM

## 2017-07-04 DIAGNOSIS — M19.90 OSTEOARTHRITIS, UNSPECIFIED OSTEOARTHRITIS TYPE, UNSPECIFIED SITE: ICD-10-CM

## 2017-07-04 DIAGNOSIS — Z94.4 HISTORY OF LIVER TRANSPLANT (H): ICD-10-CM

## 2017-07-04 DIAGNOSIS — B25.9 CYTOMEGALOVIRUS INFECTION, UNSPECIFIED CYTOMEGALOVIRAL INFECTION TYPE (H): ICD-10-CM

## 2017-07-04 DIAGNOSIS — I10 ESSENTIAL HYPERTENSION: ICD-10-CM

## 2017-07-04 DIAGNOSIS — I96 GANGRENE OF FINGER (H): ICD-10-CM

## 2017-07-04 DIAGNOSIS — R03.0 ELEVATED BLOOD PRESSURE READING: ICD-10-CM

## 2017-07-04 DIAGNOSIS — R68.2 DRY MOUTH: ICD-10-CM

## 2017-07-04 DIAGNOSIS — E87.5 HYPERKALEMIA: ICD-10-CM

## 2017-07-04 DIAGNOSIS — Z79.60 LONG-TERM USE OF IMMUNOSUPPRESSANT MEDICATION: ICD-10-CM

## 2017-07-04 DIAGNOSIS — E11.42 TYPE 2 DIABETES MELLITUS WITH DIABETIC POLYNEUROPATHY, UNSPECIFIED LONG TERM INSULIN USE STATUS: ICD-10-CM

## 2017-07-04 DIAGNOSIS — Z90.49 S/P RIGHT HEMICOLECTOMY: ICD-10-CM

## 2017-07-04 DIAGNOSIS — J18.9 HCAP (HEALTHCARE-ASSOCIATED PNEUMONIA): ICD-10-CM

## 2017-07-04 DIAGNOSIS — D84.9 IMMUNOSUPPRESSION (H): ICD-10-CM

## 2017-07-04 DIAGNOSIS — A41.9 SEPSIS, DUE TO UNSPECIFIED ORGANISM: ICD-10-CM

## 2017-07-04 DIAGNOSIS — D70.9 NEUTROPENIC COLITIS (H): Primary | ICD-10-CM

## 2017-07-04 DIAGNOSIS — Z94.4 LIVER TRANSPLANT RECIPIENT (H): ICD-10-CM

## 2017-07-04 DIAGNOSIS — N17.9 AKI (ACUTE KIDNEY INJURY) (H): ICD-10-CM

## 2017-07-04 DIAGNOSIS — M54.50 ACUTE LOW BACK PAIN WITHOUT SCIATICA, UNSPECIFIED BACK PAIN LATERALITY: ICD-10-CM

## 2017-07-04 DIAGNOSIS — Z94.4 S/P LIVER TRANSPLANT (H): ICD-10-CM

## 2017-07-04 DIAGNOSIS — D64.9 ANEMIA, UNSPECIFIED TYPE: ICD-10-CM

## 2017-07-04 DIAGNOSIS — K52.89 NEUTROPENIC COLITIS (H): Primary | ICD-10-CM

## 2017-07-04 DIAGNOSIS — K65.1 INTRA-ABDOMINAL ABSCESS (H): ICD-10-CM

## 2017-07-04 PROBLEM — K52.9 TYPHLITIS: Status: ACTIVE | Noted: 2017-07-04

## 2017-07-04 LAB
ABO + RH BLD: NORMAL
ABO + RH BLD: NORMAL
ALBUMIN SERPL-MCNC: 1.9 G/DL (ref 3.4–5)
ALBUMIN SERPL-MCNC: 2.3 G/DL (ref 3.4–5)
ALBUMIN SERPL-MCNC: 2.5 G/DL (ref 3.4–5)
ALP SERPL-CCNC: 40 U/L (ref 40–150)
ALP SERPL-CCNC: 42 U/L (ref 40–150)
ALP SERPL-CCNC: 44 U/L (ref 40–150)
ALT SERPL W P-5'-P-CCNC: 12 U/L (ref 0–70)
ALT SERPL W P-5'-P-CCNC: 17 U/L (ref 0–70)
ALT SERPL W P-5'-P-CCNC: 18 U/L (ref 0–70)
ANION GAP SERPL CALCULATED.3IONS-SCNC: 10 MMOL/L (ref 3–14)
ANION GAP SERPL CALCULATED.3IONS-SCNC: 12 MMOL/L (ref 3–14)
ANION GAP SERPL CALCULATED.3IONS-SCNC: 9 MMOL/L (ref 3–14)
APTT PPP: 48 SEC (ref 22–37)
APTT PPP: 49 SEC (ref 22–37)
AST SERPL W P-5'-P-CCNC: 17 U/L (ref 0–45)
AST SERPL W P-5'-P-CCNC: 32 U/L (ref 0–45)
AST SERPL W P-5'-P-CCNC: 38 U/L (ref 0–45)
BASE DEFICIT BLDA-SCNC: 12.3 MMOL/L
BASE DEFICIT BLDA-SCNC: 9.4 MMOL/L
BASE DEFICIT BLDA-SCNC: 9.8 MMOL/L
BASOPHILS # BLD AUTO: 0 10E9/L (ref 0–0.2)
BASOPHILS # BLD AUTO: 0 10E9/L (ref 0–0.2)
BASOPHILS NFR BLD AUTO: 0 %
BASOPHILS NFR BLD AUTO: 2.4 %
BILIRUB DIRECT SERPL-MCNC: 0.1 MG/DL (ref 0–0.2)
BILIRUB DIRECT SERPL-MCNC: 0.3 MG/DL (ref 0–0.2)
BILIRUB SERPL-MCNC: 0.3 MG/DL (ref 0.2–1.3)
BILIRUB SERPL-MCNC: 0.5 MG/DL (ref 0.2–1.3)
BILIRUB SERPL-MCNC: 1 MG/DL (ref 0.2–1.3)
BLD GP AB SCN SERPL QL: NORMAL
BLD PROD TYP BPU: NORMAL
BLOOD BANK CMNT PATIENT-IMP: NORMAL
BUN SERPL-MCNC: 30 MG/DL (ref 7–30)
BUN SERPL-MCNC: 38 MG/DL (ref 7–30)
BUN SERPL-MCNC: 43 MG/DL (ref 7–30)
CA-I BLD-MCNC: 4.3 MG/DL (ref 4.4–5.2)
CA-I BLD-MCNC: 4.4 MG/DL (ref 4.4–5.2)
CA-I BLD-MCNC: 4.4 MG/DL (ref 4.4–5.2)
CALCIUM SERPL-MCNC: 5.6 MG/DL (ref 8.5–10.1)
CALCIUM SERPL-MCNC: 6.9 MG/DL (ref 8.5–10.1)
CALCIUM SERPL-MCNC: 7.2 MG/DL (ref 8.5–10.1)
CHLORIDE SERPL-SCNC: 113 MMOL/L (ref 94–109)
CHLORIDE SERPL-SCNC: 116 MMOL/L (ref 94–109)
CHLORIDE SERPL-SCNC: 121 MMOL/L (ref 94–109)
CMV DNA SPEC NAA+PROBE-ACNC: ABNORMAL [IU]/ML
CMV DNA SPEC NAA+PROBE-LOG#: <2.1 {LOG_IU}/ML
CO2 SERPL-SCNC: 13 MMOL/L (ref 20–32)
CO2 SERPL-SCNC: 16 MMOL/L (ref 20–32)
CO2 SERPL-SCNC: 18 MMOL/L (ref 20–32)
CREAT SERPL-MCNC: 1.56 MG/DL (ref 0.66–1.25)
CREAT SERPL-MCNC: 2.16 MG/DL (ref 0.66–1.25)
CREAT SERPL-MCNC: 2.34 MG/DL (ref 0.66–1.25)
DIFFERENTIAL METHOD BLD: ABNORMAL
DIFFERENTIAL METHOD BLD: ABNORMAL
EOSINOPHIL # BLD AUTO: 0 10E9/L (ref 0–0.7)
EOSINOPHIL # BLD AUTO: 0 10E9/L (ref 0–0.7)
EOSINOPHIL NFR BLD AUTO: 2.4 %
EOSINOPHIL NFR BLD AUTO: 4 %
ERYTHROCYTE [DISTWIDTH] IN BLOOD BY AUTOMATED COUNT: 14.8 % (ref 10–15)
ERYTHROCYTE [DISTWIDTH] IN BLOOD BY AUTOMATED COUNT: 14.9 % (ref 10–15)
ERYTHROCYTE [DISTWIDTH] IN BLOOD BY AUTOMATED COUNT: 15 % (ref 10–15)
FIBRINOGEN PPP-MCNC: 262 MG/DL (ref 200–420)
FIBRINOGEN PPP-MCNC: 296 MG/DL (ref 200–420)
GFR SERPL CREATININE-BSD FRML MDRD: 29 ML/MIN/1.7M2
GFR SERPL CREATININE-BSD FRML MDRD: 32 ML/MIN/1.7M2
GFR SERPL CREATININE-BSD FRML MDRD: 47 ML/MIN/1.7M2
GLUCOSE BLD-MCNC: 154 MG/DL (ref 70–99)
GLUCOSE BLDC GLUCOMTR-MCNC: 156 MG/DL (ref 70–99)
GLUCOSE BLDC GLUCOMTR-MCNC: 159 MG/DL (ref 70–99)
GLUCOSE BLDC GLUCOMTR-MCNC: 167 MG/DL (ref 70–99)
GLUCOSE BLDC GLUCOMTR-MCNC: 210 MG/DL (ref 70–99)
GLUCOSE BLDC GLUCOMTR-MCNC: 239 MG/DL (ref 70–99)
GLUCOSE BLDC GLUCOMTR-MCNC: 272 MG/DL (ref 70–99)
GLUCOSE SERPL-MCNC: 168 MG/DL (ref 70–99)
GLUCOSE SERPL-MCNC: 169 MG/DL (ref 70–99)
GLUCOSE SERPL-MCNC: 187 MG/DL (ref 70–99)
GRAM STN SPEC: NORMAL
HCO3 BLD-SCNC: 14 MMOL/L (ref 21–28)
HCO3 BLD-SCNC: 16 MMOL/L (ref 21–28)
HCO3 BLD-SCNC: 16 MMOL/L (ref 21–28)
HCT VFR BLD AUTO: 28.1 % (ref 40–53)
HCT VFR BLD AUTO: 28.8 % (ref 40–53)
HCT VFR BLD AUTO: 30.6 % (ref 40–53)
HGB BLD-MCNC: 10.4 G/DL (ref 13.3–17.7)
HGB BLD-MCNC: 8.7 G/DL (ref 13.3–17.7)
HGB BLD-MCNC: 9.6 G/DL (ref 13.3–17.7)
HGB BLD-MCNC: 9.8 G/DL (ref 13.3–17.7)
IMM GRANULOCYTES # BLD: 0 10E9/L (ref 0–0.4)
IMM GRANULOCYTES NFR BLD: 0 %
INR PPP: 1.78 (ref 0.86–1.14)
INR PPP: 1.87 (ref 0.86–1.14)
INR PPP: 1.88 (ref 0.86–1.14)
LACTATE BLD-SCNC: 1.8 MMOL/L (ref 0.7–2.1)
LACTATE BLD-SCNC: 2.1 MMOL/L (ref 0.7–2.1)
LACTATE BLD-SCNC: 2.2 MMOL/L (ref 0.7–2.1)
LACTATE BLD-SCNC: 3.5 MMOL/L (ref 0.7–2.1)
LACTATE BLD-SCNC: 4 MMOL/L (ref 0.7–2.1)
LACTATE BLD-SCNC: 8.7 MMOL/L (ref 0.7–2.1)
LYMPHOCYTES # BLD AUTO: 0.2 10E9/L (ref 0.8–5.3)
LYMPHOCYTES # BLD AUTO: 0.3 10E9/L (ref 0.8–5.3)
LYMPHOCYTES NFR BLD AUTO: 22 %
LYMPHOCYTES NFR BLD AUTO: 22 %
MAGNESIUM SERPL-MCNC: 1.4 MG/DL (ref 1.6–2.3)
MAGNESIUM SERPL-MCNC: 1.7 MG/DL (ref 1.6–2.3)
MCH RBC QN AUTO: 29 PG (ref 26.5–33)
MCH RBC QN AUTO: 29.2 PG (ref 26.5–33)
MCH RBC QN AUTO: 29.5 PG (ref 26.5–33)
MCHC RBC AUTO-ENTMCNC: 34 G/DL (ref 31.5–36.5)
MCHC RBC AUTO-ENTMCNC: 34 G/DL (ref 31.5–36.5)
MCHC RBC AUTO-ENTMCNC: 34.2 G/DL (ref 31.5–36.5)
MCV RBC AUTO: 85 FL (ref 78–100)
MCV RBC AUTO: 86 FL (ref 78–100)
MCV RBC AUTO: 87 FL (ref 78–100)
METAMYELOCYTES # BLD: 0 10E9/L
METAMYELOCYTES NFR BLD MANUAL: 5 %
MICRO REPORT STATUS: NORMAL
MONOCYTES # BLD AUTO: 0.1 10E9/L (ref 0–1.3)
MONOCYTES # BLD AUTO: 0.1 10E9/L (ref 0–1.3)
MONOCYTES NFR BLD AUTO: 12 %
MONOCYTES NFR BLD AUTO: 7.1 %
MRSA DNA SPEC QL NAA+PROBE: NORMAL
MYELOCYTES # BLD: 0 10E9/L
MYELOCYTES NFR BLD MANUAL: 2 %
NEUTROPHILS # BLD AUTO: 0.5 10E9/L (ref 1.6–8.3)
NEUTROPHILS # BLD AUTO: 0.8 10E9/L (ref 1.6–8.3)
NEUTROPHILS NFR BLD AUTO: 55 %
NEUTROPHILS NFR BLD AUTO: 66.1 %
NRBC # BLD AUTO: 0 10*3/UL
NRBC BLD AUTO-RTO: 0 /100
NUM BPU REQUESTED: 4
O2/TOTAL GAS SETTING VFR VENT: 50 %
O2/TOTAL GAS SETTING VFR VENT: 52 %
O2/TOTAL GAS SETTING VFR VENT: 80 %
OXYHGB MFR BLD: 97 % (ref 92–100)
OXYHGB MFR BLD: 98 % (ref 92–100)
PCO2 BLD: 30 MM HG (ref 35–45)
PCO2 BLD: 31 MM HG (ref 35–45)
PCO2 BLD: 35 MM HG (ref 35–45)
PH BLD: 7.22 PH (ref 7.35–7.45)
PH BLD: 7.31 PH (ref 7.35–7.45)
PH BLD: 7.33 PH (ref 7.35–7.45)
PHOSPHATE SERPL-MCNC: 1.7 MG/DL (ref 2.5–4.5)
PHOSPHATE SERPL-MCNC: 3.4 MG/DL (ref 2.5–4.5)
PLATELET # BLD AUTO: 72 10E9/L (ref 150–450)
PLATELET # BLD AUTO: 74 10E9/L (ref 150–450)
PLATELET # BLD AUTO: 95 10E9/L (ref 150–450)
PO2 BLD: 169 MM HG (ref 80–105)
PO2 BLD: 305 MM HG (ref 80–105)
PO2 BLD: 87 MM HG (ref 80–105)
POTASSIUM BLD-SCNC: 4.5 MMOL/L (ref 3.4–5.3)
POTASSIUM SERPL-SCNC: 3.9 MMOL/L (ref 3.4–5.3)
POTASSIUM SERPL-SCNC: 4.9 MMOL/L (ref 3.4–5.3)
POTASSIUM SERPL-SCNC: 5.7 MMOL/L (ref 3.4–5.3)
PROT SERPL-MCNC: 3.8 G/DL (ref 6.8–8.8)
PROT SERPL-MCNC: 4.2 G/DL (ref 6.8–8.8)
PROT SERPL-MCNC: 4.5 G/DL (ref 6.8–8.8)
RBC # BLD AUTO: 3.25 10E12/L (ref 4.4–5.9)
RBC # BLD AUTO: 3.36 10E12/L (ref 4.4–5.9)
RBC # BLD AUTO: 3.59 10E12/L (ref 4.4–5.9)
SODIUM BLD-SCNC: 137 MMOL/L (ref 133–144)
SODIUM SERPL-SCNC: 140 MMOL/L (ref 133–144)
SODIUM SERPL-SCNC: 143 MMOL/L (ref 133–144)
SODIUM SERPL-SCNC: 146 MMOL/L (ref 133–144)
SPECIMEN EXP DATE BLD: NORMAL
SPECIMEN SOURCE: ABNORMAL
SPECIMEN SOURCE: NORMAL
SPECIMEN SOURCE: NORMAL
WBC # BLD AUTO: 0.9 10E9/L (ref 4–11)
WBC # BLD AUTO: 1.1 10E9/L (ref 4–11)
WBC # BLD AUTO: 1.3 10E9/L (ref 4–11)

## 2017-07-04 PROCEDURE — 86850 RBC ANTIBODY SCREEN: CPT | Performed by: TRANSPLANT SURGERY

## 2017-07-04 PROCEDURE — 84295 ASSAY OF SERUM SODIUM: CPT | Performed by: TRANSPLANT SURGERY

## 2017-07-04 PROCEDURE — 71010 XR CHEST PORT 1 VW: CPT

## 2017-07-04 PROCEDURE — 25000128 H RX IP 250 OP 636: Performed by: STUDENT IN AN ORGANIZED HEALTH CARE EDUCATION/TRAINING PROGRAM

## 2017-07-04 PROCEDURE — 85384 FIBRINOGEN ACTIVITY: CPT | Performed by: TRANSPLANT SURGERY

## 2017-07-04 PROCEDURE — 93005 ELECTROCARDIOGRAM TRACING: CPT

## 2017-07-04 PROCEDURE — 40000556 ZZH STATISTIC PERIPHERAL IV START W US GUIDANCE

## 2017-07-04 PROCEDURE — 93010 ELECTROCARDIOGRAM REPORT: CPT | Mod: 76 | Performed by: INTERNAL MEDICINE

## 2017-07-04 PROCEDURE — 40000940 XR CHEST PORT 1 VW

## 2017-07-04 PROCEDURE — 83605 ASSAY OF LACTIC ACID: CPT | Performed by: TRANSPLANT SURGERY

## 2017-07-04 PROCEDURE — 86901 BLOOD TYPING SEROLOGIC RH(D): CPT | Performed by: TRANSPLANT SURGERY

## 2017-07-04 PROCEDURE — 83735 ASSAY OF MAGNESIUM: CPT | Performed by: TRANSPLANT SURGERY

## 2017-07-04 PROCEDURE — 84132 ASSAY OF SERUM POTASSIUM: CPT | Performed by: TRANSPLANT SURGERY

## 2017-07-04 PROCEDURE — 85610 PROTHROMBIN TIME: CPT | Performed by: SURGERY

## 2017-07-04 PROCEDURE — 85730 THROMBOPLASTIN TIME PARTIAL: CPT | Performed by: SURGERY

## 2017-07-04 PROCEDURE — 82947 ASSAY GLUCOSE BLOOD QUANT: CPT | Performed by: TRANSPLANT SURGERY

## 2017-07-04 PROCEDURE — 83605 ASSAY OF LACTIC ACID: CPT | Performed by: STUDENT IN AN ORGANIZED HEALTH CARE EDUCATION/TRAINING PROGRAM

## 2017-07-04 PROCEDURE — 85025 COMPLETE CBC W/AUTO DIFF WBC: CPT | Performed by: TRANSPLANT SURGERY

## 2017-07-04 PROCEDURE — 25000128 H RX IP 250 OP 636

## 2017-07-04 PROCEDURE — 25000128 H RX IP 250 OP 636: Performed by: SURGERY

## 2017-07-04 PROCEDURE — 25000128 H RX IP 250 OP 636: Performed by: TRANSPLANT SURGERY

## 2017-07-04 PROCEDURE — 36415 COLL VENOUS BLD VENIPUNCTURE: CPT | Performed by: TRANSPLANT SURGERY

## 2017-07-04 PROCEDURE — 85610 PROTHROMBIN TIME: CPT

## 2017-07-04 PROCEDURE — 27210794 ZZH OR GENERAL SUPPLY STERILE: Performed by: TRANSPLANT SURGERY

## 2017-07-04 PROCEDURE — 82248 BILIRUBIN DIRECT: CPT

## 2017-07-04 PROCEDURE — 82803 BLOOD GASES ANY COMBINATION: CPT | Performed by: STUDENT IN AN ORGANIZED HEALTH CARE EDUCATION/TRAINING PROGRAM

## 2017-07-04 PROCEDURE — 83605 ASSAY OF LACTIC ACID: CPT | Performed by: SURGERY

## 2017-07-04 PROCEDURE — 85384 FIBRINOGEN ACTIVITY: CPT | Performed by: SURGERY

## 2017-07-04 PROCEDURE — 36000057 ZZH SURGERY LEVEL 3 1ST 30 MIN - UMMC: Performed by: TRANSPLANT SURGERY

## 2017-07-04 PROCEDURE — 82330 ASSAY OF CALCIUM: CPT | Performed by: TRANSPLANT SURGERY

## 2017-07-04 PROCEDURE — 20000004 ZZH R&B ICU UMMC

## 2017-07-04 PROCEDURE — 25000125 ZZHC RX 250: Performed by: STUDENT IN AN ORGANIZED HEALTH CARE EDUCATION/TRAINING PROGRAM

## 2017-07-04 PROCEDURE — 85027 COMPLETE CBC AUTOMATED: CPT

## 2017-07-04 PROCEDURE — 25000131 ZZH RX MED GY IP 250 OP 636 PS 637: Performed by: TRANSPLANT SURGERY

## 2017-07-04 PROCEDURE — 40000275 ZZH STATISTIC RCP TIME EA 10 MIN

## 2017-07-04 PROCEDURE — 85025 COMPLETE CBC W/AUTO DIFF WBC: CPT | Performed by: SURGERY

## 2017-07-04 PROCEDURE — 25000125 ZZHC RX 250: Performed by: TRANSPLANT SURGERY

## 2017-07-04 PROCEDURE — 94002 VENT MGMT INPAT INIT DAY: CPT

## 2017-07-04 PROCEDURE — 86923 COMPATIBILITY TEST ELECTRIC: CPT | Performed by: TRANSPLANT SURGERY

## 2017-07-04 PROCEDURE — 25000125 ZZHC RX 250: Performed by: NURSE ANESTHETIST, CERTIFIED REGISTERED

## 2017-07-04 PROCEDURE — 87070 CULTURE OTHR SPECIMN AEROBIC: CPT | Performed by: TRANSPLANT SURGERY

## 2017-07-04 PROCEDURE — 87075 CULTR BACTERIA EXCEPT BLOOD: CPT | Performed by: TRANSPLANT SURGERY

## 2017-07-04 PROCEDURE — 80076 HEPATIC FUNCTION PANEL: CPT | Performed by: SURGERY

## 2017-07-04 PROCEDURE — 83735 ASSAY OF MAGNESIUM: CPT

## 2017-07-04 PROCEDURE — 82330 ASSAY OF CALCIUM: CPT | Performed by: SURGERY

## 2017-07-04 PROCEDURE — 25000565 ZZH ISOFLURANE, EA 15 MIN: Performed by: TRANSPLANT SURGERY

## 2017-07-04 PROCEDURE — 86900 BLOOD TYPING SEROLOGIC ABO: CPT | Performed by: TRANSPLANT SURGERY

## 2017-07-04 PROCEDURE — 74020 XR ABDOMEN PORT 2 VW: CPT

## 2017-07-04 PROCEDURE — 36000059 ZZH SURGERY LEVEL 3 EA 15 ADDTL MIN UMMC: Performed by: TRANSPLANT SURGERY

## 2017-07-04 PROCEDURE — 25000128 H RX IP 250 OP 636: Performed by: NURSE ANESTHETIST, CERTIFIED REGISTERED

## 2017-07-04 PROCEDURE — 80053 COMPREHEN METABOLIC PANEL: CPT

## 2017-07-04 PROCEDURE — 87641 MR-STAPH DNA AMP PROBE: CPT | Performed by: STUDENT IN AN ORGANIZED HEALTH CARE EDUCATION/TRAINING PROGRAM

## 2017-07-04 PROCEDURE — 5A1955Z RESPIRATORY VENTILATION, GREATER THAN 96 CONSECUTIVE HOURS: ICD-10-PCS | Performed by: TRANSPLANT SURGERY

## 2017-07-04 PROCEDURE — 37000009 ZZH ANESTHESIA TECHNICAL FEE, EACH ADDTL 15 MIN: Performed by: TRANSPLANT SURGERY

## 2017-07-04 PROCEDURE — 87102 FUNGUS ISOLATION CULTURE: CPT | Performed by: TRANSPLANT SURGERY

## 2017-07-04 PROCEDURE — 3E043XZ INTRODUCTION OF VASOPRESSOR INTO CENTRAL VEIN, PERCUTANEOUS APPROACH: ICD-10-PCS | Performed by: TRANSPLANT SURGERY

## 2017-07-04 PROCEDURE — 85730 THROMBOPLASTIN TIME PARTIAL: CPT | Performed by: TRANSPLANT SURGERY

## 2017-07-04 PROCEDURE — 87640 STAPH A DNA AMP PROBE: CPT | Performed by: STUDENT IN AN ORGANIZED HEALTH CARE EDUCATION/TRAINING PROGRAM

## 2017-07-04 PROCEDURE — 80048 BASIC METABOLIC PNL TOTAL CA: CPT | Performed by: SURGERY

## 2017-07-04 PROCEDURE — G0378 HOSPITAL OBSERVATION PER HR: HCPCS

## 2017-07-04 PROCEDURE — 25000131 ZZH RX MED GY IP 250 OP 636 PS 637

## 2017-07-04 PROCEDURE — 84100 ASSAY OF PHOSPHORUS: CPT | Performed by: TRANSPLANT SURGERY

## 2017-07-04 PROCEDURE — 85610 PROTHROMBIN TIME: CPT | Performed by: TRANSPLANT SURGERY

## 2017-07-04 PROCEDURE — 87205 SMEAR GRAM STAIN: CPT | Performed by: TRANSPLANT SURGERY

## 2017-07-04 PROCEDURE — 37000008 ZZH ANESTHESIA TECHNICAL FEE, 1ST 30 MIN: Performed by: TRANSPLANT SURGERY

## 2017-07-04 PROCEDURE — 87040 BLOOD CULTURE FOR BACTERIA: CPT

## 2017-07-04 PROCEDURE — 0WJG0ZZ INSPECTION OF PERITONEAL CAVITY, OPEN APPROACH: ICD-10-PCS | Performed by: TRANSPLANT SURGERY

## 2017-07-04 PROCEDURE — 80053 COMPREHEN METABOLIC PANEL: CPT | Performed by: TRANSPLANT SURGERY

## 2017-07-04 PROCEDURE — 82803 BLOOD GASES ANY COMBINATION: CPT | Performed by: TRANSPLANT SURGERY

## 2017-07-04 PROCEDURE — 82805 BLOOD GASES W/O2 SATURATION: CPT | Performed by: SURGERY

## 2017-07-04 PROCEDURE — 25000125 ZZHC RX 250: Performed by: SURGERY

## 2017-07-04 PROCEDURE — P9047 ALBUMIN (HUMAN), 25%, 50ML: HCPCS | Performed by: SURGERY

## 2017-07-04 PROCEDURE — 84100 ASSAY OF PHOSPHORUS: CPT

## 2017-07-04 PROCEDURE — 00000146 ZZHCL STATISTIC GLUCOSE BY METER IP

## 2017-07-04 PROCEDURE — 99291 CRITICAL CARE FIRST HOUR: CPT | Mod: GC | Performed by: SURGERY

## 2017-07-04 PROCEDURE — 40000014 ZZH STATISTIC ARTERIAL MONITORING DAILY

## 2017-07-04 PROCEDURE — P9041 ALBUMIN (HUMAN),5%, 50ML: HCPCS | Performed by: NURSE ANESTHETIST, CERTIFIED REGISTERED

## 2017-07-04 RX ORDER — HYDROMORPHONE HCL/0.9% NACL/PF 0.2MG/0.2
0.2 SYRINGE (ML) INTRAVENOUS
Status: DISCONTINUED | OUTPATIENT
Start: 2017-07-04 | End: 2017-07-04

## 2017-07-04 RX ORDER — SODIUM CHLORIDE, SODIUM LACTATE, POTASSIUM CHLORIDE, CALCIUM CHLORIDE 600; 310; 30; 20 MG/100ML; MG/100ML; MG/100ML; MG/100ML
INJECTION, SOLUTION INTRAVENOUS CONTINUOUS PRN
Status: DISCONTINUED | OUTPATIENT
Start: 2017-07-04 | End: 2017-07-04

## 2017-07-04 RX ORDER — HYDROMORPHONE HYDROCHLORIDE 1 MG/ML
.3-.5 INJECTION, SOLUTION INTRAMUSCULAR; INTRAVENOUS; SUBCUTANEOUS
Status: DISCONTINUED | OUTPATIENT
Start: 2017-07-04 | End: 2017-07-04

## 2017-07-04 RX ORDER — ONDANSETRON 4 MG/1
4 TABLET, ORALLY DISINTEGRATING ORAL EVERY 6 HOURS PRN
Status: DISCONTINUED | OUTPATIENT
Start: 2017-07-04 | End: 2017-07-25

## 2017-07-04 RX ORDER — PROCHLORPERAZINE MALEATE 5 MG
5-10 TABLET ORAL EVERY 6 HOURS PRN
Status: DISCONTINUED | OUTPATIENT
Start: 2017-07-04 | End: 2017-07-04

## 2017-07-04 RX ORDER — BISACODYL 10 MG
10 SUPPOSITORY, RECTAL RECTAL DAILY PRN
Status: DISCONTINUED | OUTPATIENT
Start: 2017-07-04 | End: 2017-07-31

## 2017-07-04 RX ORDER — SODIUM CHLORIDE, SODIUM LACTATE, POTASSIUM CHLORIDE, CALCIUM CHLORIDE 600; 310; 30; 20 MG/100ML; MG/100ML; MG/100ML; MG/100ML
INJECTION, SOLUTION INTRAVENOUS CONTINUOUS
Status: DISCONTINUED | OUTPATIENT
Start: 2017-07-04 | End: 2017-07-04

## 2017-07-04 RX ORDER — HYDROMORPHONE HCL/0.9% NACL/PF 0.2MG/0.2
0.2 SYRINGE (ML) INTRAVENOUS ONCE
Status: DISCONTINUED | OUTPATIENT
Start: 2017-07-04 | End: 2017-07-05

## 2017-07-04 RX ORDER — TACROLIMUS 1 MG/1
4 CAPSULE ORAL EVERY MORNING
Status: DISCONTINUED | OUTPATIENT
Start: 2017-07-04 | End: 2017-07-04

## 2017-07-04 RX ORDER — CEFAZOLIN SODIUM 2 G/100ML
2 INJECTION, SOLUTION INTRAVENOUS
Status: CANCELLED | OUTPATIENT
Start: 2017-07-04

## 2017-07-04 RX ORDER — HYDROMORPHONE HYDROCHLORIDE 1 MG/ML
INJECTION, SOLUTION INTRAMUSCULAR; INTRAVENOUS; SUBCUTANEOUS
Status: COMPLETED
Start: 2017-07-04 | End: 2017-07-04

## 2017-07-04 RX ORDER — NALOXONE HYDROCHLORIDE 0.4 MG/ML
.1-.4 INJECTION, SOLUTION INTRAMUSCULAR; INTRAVENOUS; SUBCUTANEOUS
Status: DISCONTINUED | OUTPATIENT
Start: 2017-07-04 | End: 2017-07-04

## 2017-07-04 RX ORDER — POTASSIUM CHLORIDE 1.5 G/1.58G
20-40 POWDER, FOR SOLUTION ORAL
Status: DISCONTINUED | OUTPATIENT
Start: 2017-07-04 | End: 2017-08-07

## 2017-07-04 RX ORDER — SODIUM CHLORIDE, SODIUM LACTATE, POTASSIUM CHLORIDE, CALCIUM CHLORIDE 600; 310; 30; 20 MG/100ML; MG/100ML; MG/100ML; MG/100ML
INJECTION, SOLUTION INTRAVENOUS CONTINUOUS
Status: DISPENSED | OUTPATIENT
Start: 2017-07-04 | End: 2017-07-04

## 2017-07-04 RX ORDER — HYDROMORPHONE HCL/0.9% NACL/PF 0.2MG/0.2
0.2 SYRINGE (ML) INTRAVENOUS EVERY 4 HOURS PRN
Status: DISCONTINUED | OUTPATIENT
Start: 2017-07-04 | End: 2017-07-04

## 2017-07-04 RX ORDER — TACROLIMUS 1 MG/1
3 CAPSULE ORAL
Status: DISCONTINUED | OUTPATIENT
Start: 2017-07-04 | End: 2017-07-04

## 2017-07-04 RX ORDER — SODIUM CHLORIDE, SODIUM LACTATE, POTASSIUM CHLORIDE, CALCIUM CHLORIDE 600; 310; 30; 20 MG/100ML; MG/100ML; MG/100ML; MG/100ML
INJECTION, SOLUTION INTRAVENOUS CONTINUOUS
Status: DISCONTINUED | OUTPATIENT
Start: 2017-07-04 | End: 2017-07-05

## 2017-07-04 RX ORDER — PROCHLORPERAZINE MALEATE 5 MG
5-10 TABLET ORAL EVERY 6 HOURS PRN
Status: DISCONTINUED | OUTPATIENT
Start: 2017-07-04 | End: 2017-07-11

## 2017-07-04 RX ORDER — HYDROMORPHONE HYDROCHLORIDE 1 MG/ML
0.5 INJECTION, SOLUTION INTRAMUSCULAR; INTRAVENOUS; SUBCUTANEOUS ONCE
Status: COMPLETED | OUTPATIENT
Start: 2017-07-04 | End: 2017-07-04

## 2017-07-04 RX ORDER — POTASSIUM CHLORIDE 7.45 MG/ML
10 INJECTION INTRAVENOUS
Status: DISCONTINUED | OUTPATIENT
Start: 2017-07-04 | End: 2017-08-07

## 2017-07-04 RX ORDER — POTASSIUM CL/LIDO/0.9 % NACL 10MEQ/0.1L
10 INTRAVENOUS SOLUTION, PIGGYBACK (ML) INTRAVENOUS
Status: DISCONTINUED | OUTPATIENT
Start: 2017-07-04 | End: 2017-08-07

## 2017-07-04 RX ORDER — ONDANSETRON 2 MG/ML
4 INJECTION INTRAMUSCULAR; INTRAVENOUS EVERY 6 HOURS PRN
Status: DISCONTINUED | OUTPATIENT
Start: 2017-07-04 | End: 2017-07-25

## 2017-07-04 RX ORDER — VALGANCICLOVIR 450 MG/1
450 TABLET, FILM COATED ORAL DAILY
Status: DISCONTINUED | OUTPATIENT
Start: 2017-07-04 | End: 2017-07-04

## 2017-07-04 RX ORDER — POTASSIUM CHLORIDE 750 MG/1
20-40 TABLET, EXTENDED RELEASE ORAL
Status: DISCONTINUED | OUTPATIENT
Start: 2017-07-04 | End: 2017-08-07

## 2017-07-04 RX ORDER — NALOXONE HYDROCHLORIDE 0.4 MG/ML
.1-.4 INJECTION, SOLUTION INTRAMUSCULAR; INTRAVENOUS; SUBCUTANEOUS
Status: DISCONTINUED | OUTPATIENT
Start: 2017-07-04 | End: 2017-07-25

## 2017-07-04 RX ORDER — ACETAMINOPHEN 325 MG/1
650 TABLET ORAL EVERY 4 HOURS PRN
Status: DISCONTINUED | OUTPATIENT
Start: 2017-07-04 | End: 2017-07-31

## 2017-07-04 RX ORDER — EPHEDRINE SULFATE 50 MG/ML
INJECTION, SOLUTION INTRAMUSCULAR; INTRAVENOUS; SUBCUTANEOUS PRN
Status: DISCONTINUED | OUTPATIENT
Start: 2017-07-04 | End: 2017-07-04

## 2017-07-04 RX ORDER — ALBUMIN, HUMAN INJ 5% 5 %
SOLUTION INTRAVENOUS CONTINUOUS PRN
Status: DISCONTINUED | OUTPATIENT
Start: 2017-07-04 | End: 2017-07-04

## 2017-07-04 RX ORDER — SODIUM CHLORIDE 9 MG/ML
INJECTION, SOLUTION INTRAVENOUS CONTINUOUS PRN
Status: DISCONTINUED | OUTPATIENT
Start: 2017-07-04 | End: 2017-07-04

## 2017-07-04 RX ORDER — SODIUM CHLORIDE 9 MG/ML
INJECTION, SOLUTION INTRAVENOUS CONTINUOUS
Status: DISCONTINUED | OUTPATIENT
Start: 2017-07-04 | End: 2017-07-04

## 2017-07-04 RX ORDER — ACETAMINOPHEN 650 MG/1
650 SUPPOSITORY RECTAL EVERY 4 HOURS PRN
Status: DISCONTINUED | OUTPATIENT
Start: 2017-07-04 | End: 2017-07-31

## 2017-07-04 RX ORDER — DEXTROSE MONOHYDRATE 25 G/50ML
25-50 INJECTION, SOLUTION INTRAVENOUS
Status: DISCONTINUED | OUTPATIENT
Start: 2017-07-04 | End: 2017-07-05

## 2017-07-04 RX ORDER — PIPERACILLIN SODIUM, TAZOBACTAM SODIUM 3; .375 G/15ML; G/15ML
3.38 INJECTION, POWDER, LYOPHILIZED, FOR SOLUTION INTRAVENOUS EVERY 6 HOURS
Status: DISCONTINUED | OUTPATIENT
Start: 2017-07-04 | End: 2017-07-05

## 2017-07-04 RX ORDER — ONDANSETRON 2 MG/ML
4 INJECTION INTRAMUSCULAR; INTRAVENOUS EVERY 6 HOURS PRN
Status: DISCONTINUED | OUTPATIENT
Start: 2017-07-04 | End: 2017-07-04

## 2017-07-04 RX ORDER — FLUDROCORTISONE ACETATE 0.1 MG/1
0.1 TABLET ORAL DAILY
Status: DISCONTINUED | OUTPATIENT
Start: 2017-07-04 | End: 2017-07-18

## 2017-07-04 RX ORDER — ONDANSETRON 4 MG/1
4 TABLET, ORALLY DISINTEGRATING ORAL EVERY 6 HOURS PRN
Status: DISCONTINUED | OUTPATIENT
Start: 2017-07-04 | End: 2017-07-04

## 2017-07-04 RX ORDER — FENTANYL CITRATE 50 UG/ML
INJECTION, SOLUTION INTRAMUSCULAR; INTRAVENOUS PRN
Status: DISCONTINUED | OUTPATIENT
Start: 2017-07-04 | End: 2017-07-04

## 2017-07-04 RX ORDER — MAGNESIUM SULFATE HEPTAHYDRATE 40 MG/ML
4 INJECTION, SOLUTION INTRAVENOUS EVERY 4 HOURS PRN
Status: DISCONTINUED | OUTPATIENT
Start: 2017-07-04 | End: 2017-09-06

## 2017-07-04 RX ORDER — PROPOFOL 10 MG/ML
5-75 INJECTION, EMULSION INTRAVENOUS CONTINUOUS
Status: DISCONTINUED | OUTPATIENT
Start: 2017-07-04 | End: 2017-07-05

## 2017-07-04 RX ORDER — SODIUM CHLORIDE 9 MG/ML
1000 INJECTION, SOLUTION INTRAVENOUS CONTINUOUS
Status: DISCONTINUED | OUTPATIENT
Start: 2017-07-04 | End: 2017-07-04

## 2017-07-04 RX ORDER — LIDOCAINE HYDROCHLORIDE 20 MG/ML
INJECTION, SOLUTION INFILTRATION; PERINEURAL PRN
Status: DISCONTINUED | OUTPATIENT
Start: 2017-07-04 | End: 2017-07-04

## 2017-07-04 RX ORDER — DEXTROSE MONOHYDRATE, SODIUM CHLORIDE, AND POTASSIUM CHLORIDE 50; 1.49; 4.5 G/1000ML; G/1000ML; G/1000ML
INJECTION, SOLUTION INTRAVENOUS CONTINUOUS
Status: DISCONTINUED | OUTPATIENT
Start: 2017-07-04 | End: 2017-07-04

## 2017-07-04 RX ORDER — ALBUMIN (HUMAN) 12.5 G/50ML
12.5 SOLUTION INTRAVENOUS EVERY 6 HOURS
Status: DISCONTINUED | OUTPATIENT
Start: 2017-07-04 | End: 2017-07-06

## 2017-07-04 RX ORDER — HYDROMORPHONE HYDROCHLORIDE 1 MG/ML
.3-.5 INJECTION, SOLUTION INTRAMUSCULAR; INTRAVENOUS; SUBCUTANEOUS
Status: DISCONTINUED | OUTPATIENT
Start: 2017-07-04 | End: 2017-07-05

## 2017-07-04 RX ORDER — PIPERACILLIN SODIUM, TAZOBACTAM SODIUM 3; .375 G/15ML; G/15ML
3.38 INJECTION, POWDER, LYOPHILIZED, FOR SOLUTION INTRAVENOUS EVERY 6 HOURS
Status: DISCONTINUED | OUTPATIENT
Start: 2017-07-04 | End: 2017-07-04

## 2017-07-04 RX ORDER — ACETAMINOPHEN 650 MG/1
650 SUPPOSITORY RECTAL EVERY 4 HOURS PRN
Status: DISCONTINUED | OUTPATIENT
Start: 2017-07-04 | End: 2017-07-04

## 2017-07-04 RX ORDER — PROPOFOL 10 MG/ML
10-20 INJECTION, EMULSION INTRAVENOUS EVERY 30 MIN PRN
Status: DISCONTINUED | OUTPATIENT
Start: 2017-07-04 | End: 2017-07-05

## 2017-07-04 RX ORDER — POTASSIUM CHLORIDE 29.8 MG/ML
20 INJECTION INTRAVENOUS
Status: DISCONTINUED | OUTPATIENT
Start: 2017-07-04 | End: 2017-08-07

## 2017-07-04 RX ORDER — NICOTINE POLACRILEX 4 MG
15-30 LOZENGE BUCCAL
Status: DISCONTINUED | OUTPATIENT
Start: 2017-07-04 | End: 2017-07-05

## 2017-07-04 RX ORDER — CEFAZOLIN SODIUM 1 G/3ML
1 INJECTION, POWDER, FOR SOLUTION INTRAMUSCULAR; INTRAVENOUS SEE ADMIN INSTRUCTIONS
Status: CANCELLED | OUTPATIENT
Start: 2017-07-04

## 2017-07-04 RX ORDER — SODIUM CHLORIDE 9 MG/ML
INJECTION, SOLUTION INTRAVENOUS
Status: DISCONTINUED
Start: 2017-07-04 | End: 2017-07-05 | Stop reason: HOSPADM

## 2017-07-04 RX ORDER — PROCHLORPERAZINE 25 MG
25 SUPPOSITORY, RECTAL RECTAL EVERY 12 HOURS PRN
Status: DISCONTINUED | OUTPATIENT
Start: 2017-07-04 | End: 2017-07-11

## 2017-07-04 RX ORDER — DEXTROSE MONOHYDRATE, SODIUM CHLORIDE, AND POTASSIUM CHLORIDE 50; 1.49; 4.5 G/1000ML; G/1000ML; G/1000ML
INJECTION, SOLUTION INTRAVENOUS
Status: DISCONTINUED
Start: 2017-07-04 | End: 2017-07-05 | Stop reason: HOSPADM

## 2017-07-04 RX ORDER — LIDOCAINE 40 MG/G
CREAM TOPICAL
Status: DISCONTINUED | OUTPATIENT
Start: 2017-07-04 | End: 2017-07-25

## 2017-07-04 RX ORDER — PROPOFOL 10 MG/ML
INJECTION, EMULSION INTRAVENOUS PRN
Status: DISCONTINUED | OUTPATIENT
Start: 2017-07-04 | End: 2017-07-04

## 2017-07-04 RX ADMIN — HYDROMORPHONE HYDROCHLORIDE 0.5 MG: 1 INJECTION, SOLUTION INTRAMUSCULAR; INTRAVENOUS; SUBCUTANEOUS at 10:37

## 2017-07-04 RX ADMIN — PHENYLEPHRINE HYDROCHLORIDE 200 MCG: 10 INJECTION, SOLUTION INTRAMUSCULAR; INTRAVENOUS; SUBCUTANEOUS at 11:37

## 2017-07-04 RX ADMIN — ROCURONIUM BROMIDE 50 MG: 10 INJECTION INTRAVENOUS at 11:20

## 2017-07-04 RX ADMIN — VASOPRESSIN 2 UNITS: 20 INJECTION, SOLUTION INTRAMUSCULAR; SUBCUTANEOUS at 11:33

## 2017-07-04 RX ADMIN — SODIUM CHLORIDE, POTASSIUM CHLORIDE, SODIUM LACTATE AND CALCIUM CHLORIDE: 600; 310; 30; 20 INJECTION, SOLUTION INTRAVENOUS at 17:35

## 2017-07-04 RX ADMIN — MAGNESIUM SULFATE HEPTAHYDRATE: 500 INJECTION, SOLUTION INTRAMUSCULAR; INTRAVENOUS at 20:33

## 2017-07-04 RX ADMIN — INSULIN ASPART 2 UNITS: 100 INJECTION, SOLUTION INTRAVENOUS; SUBCUTANEOUS at 23:57

## 2017-07-04 RX ADMIN — HYDROMORPHONE HYDROCHLORIDE 0.5 MG: 1 INJECTION, SOLUTION INTRAMUSCULAR; INTRAVENOUS; SUBCUTANEOUS at 09:21

## 2017-07-04 RX ADMIN — NOREPINEPHRINE BITARTRATE 6.4 MCG: 1 INJECTION INTRAVENOUS at 12:30

## 2017-07-04 RX ADMIN — FENTANYL CITRATE 50 MCG/HR: 50 INJECTION, SOLUTION INTRAMUSCULAR; INTRAVENOUS at 14:31

## 2017-07-04 RX ADMIN — INSULIN ASPART 1 UNITS: 100 INJECTION, SOLUTION INTRAVENOUS; SUBCUTANEOUS at 20:05

## 2017-07-04 RX ADMIN — Medication 2 G: at 09:21

## 2017-07-04 RX ADMIN — SODIUM CHLORIDE, POTASSIUM CHLORIDE, SODIUM LACTATE AND CALCIUM CHLORIDE: 600; 310; 30; 20 INJECTION, SOLUTION INTRAVENOUS at 19:57

## 2017-07-04 RX ADMIN — ALBUMIN (HUMAN) 12.5 G: 12.5 SOLUTION INTRAVENOUS at 18:08

## 2017-07-04 RX ADMIN — HYDROMORPHONE HYDROCHLORIDE 0.2 MG: 10 INJECTION, SOLUTION INTRAMUSCULAR; INTRAVENOUS; SUBCUTANEOUS at 06:50

## 2017-07-04 RX ADMIN — PHENYLEPHRINE HYDROCHLORIDE 200 MCG: 10 INJECTION, SOLUTION INTRAMUSCULAR; INTRAVENOUS; SUBCUTANEOUS at 11:20

## 2017-07-04 RX ADMIN — PROPOFOL 30 MG: 10 INJECTION, EMULSION INTRAVENOUS at 12:38

## 2017-07-04 RX ADMIN — SODIUM CHLORIDE, POTASSIUM CHLORIDE, SODIUM LACTATE AND CALCIUM CHLORIDE 2000 ML: 600; 310; 30; 20 INJECTION, SOLUTION INTRAVENOUS at 10:04

## 2017-07-04 RX ADMIN — MIDAZOLAM HYDROCHLORIDE 2 MG: 1 INJECTION, SOLUTION INTRAMUSCULAR; INTRAVENOUS at 11:05

## 2017-07-04 RX ADMIN — SODIUM CHLORIDE: 9 INJECTION, SOLUTION INTRAVENOUS at 14:23

## 2017-07-04 RX ADMIN — NOREPINEPHRINE BITARTRATE 6.4 MCG: 1 INJECTION INTRAVENOUS at 12:10

## 2017-07-04 RX ADMIN — VASOPRESSIN 2 UNITS: 20 INJECTION, SOLUTION INTRAMUSCULAR; SUBCUTANEOUS at 11:20

## 2017-07-04 RX ADMIN — PROPOFOL 35 MCG/KG/MIN: 10 INJECTION, EMULSION INTRAVENOUS at 22:18

## 2017-07-04 RX ADMIN — RANITIDINE HYDROCHLORIDE 50 MG: 25 INJECTION INTRAMUSCULAR; INTRAVENOUS at 21:14

## 2017-07-04 RX ADMIN — ALBUMIN (HUMAN): 12.5 SOLUTION INTRAVENOUS at 11:14

## 2017-07-04 RX ADMIN — VANCOMYCIN HYDROCHLORIDE 1750 MG: 10 INJECTION, POWDER, LYOPHILIZED, FOR SOLUTION INTRAVENOUS at 08:34

## 2017-07-04 RX ADMIN — ONDANSETRON 4 MG: 2 INJECTION INTRAMUSCULAR; INTRAVENOUS at 07:01

## 2017-07-04 RX ADMIN — SODIUM CHLORIDE, POTASSIUM CHLORIDE, SODIUM LACTATE AND CALCIUM CHLORIDE 1000 ML: 600; 310; 30; 20 INJECTION, SOLUTION INTRAVENOUS at 20:38

## 2017-07-04 RX ADMIN — INSULIN ASPART 2 UNITS: 100 INJECTION, SOLUTION INTRAVENOUS; SUBCUTANEOUS at 08:47

## 2017-07-04 RX ADMIN — TACROLIMUS 3 MG: 5 CAPSULE ORAL at 20:33

## 2017-07-04 RX ADMIN — LIDOCAINE HYDROCHLORIDE 100 MG: 20 INJECTION, SOLUTION INFILTRATION; PERINEURAL at 11:05

## 2017-07-04 RX ADMIN — METRONIDAZOLE 500 MG: 500 INJECTION, SOLUTION INTRAVENOUS at 17:36

## 2017-07-04 RX ADMIN — NOREPINEPHRINE BITARTRATE 6.4 MCG: 1 INJECTION INTRAVENOUS at 12:45

## 2017-07-04 RX ADMIN — SODIUM CHLORIDE: 9 INJECTION, SOLUTION INTRAVENOUS at 10:45

## 2017-07-04 RX ADMIN — CALCIUM GLUCONATE 1 G: 94 INJECTION, SOLUTION INTRAVENOUS at 09:52

## 2017-07-04 RX ADMIN — ONDANSETRON 4 MG: 2 INJECTION INTRAMUSCULAR; INTRAVENOUS at 12:30

## 2017-07-04 RX ADMIN — RANITIDINE HYDROCHLORIDE 50 MG: 25 INJECTION INTRAMUSCULAR; INTRAVENOUS at 14:45

## 2017-07-04 RX ADMIN — Medication 2 G: at 16:25

## 2017-07-04 RX ADMIN — PIPERACILLIN AND TAZOBACTAM 3.38 G: 3; .375 INJECTION, POWDER, LYOPHILIZED, FOR SOLUTION INTRAVENOUS; PARENTERAL at 19:56

## 2017-07-04 RX ADMIN — FENTANYL CITRATE 50 MCG: 50 INJECTION, SOLUTION INTRAMUSCULAR; INTRAVENOUS at 10:45

## 2017-07-04 RX ADMIN — VANCOMYCIN HYDROCHLORIDE 1750 MG: 10 INJECTION, POWDER, LYOPHILIZED, FOR SOLUTION INTRAVENOUS at 21:36

## 2017-07-04 RX ADMIN — METRONIDAZOLE 500 MG: 500 INJECTION, SOLUTION INTRAVENOUS at 10:16

## 2017-07-04 RX ADMIN — FENTANYL CITRATE 50 MCG: 50 INJECTION, SOLUTION INTRAMUSCULAR; INTRAVENOUS at 10:56

## 2017-07-04 RX ADMIN — PHENYLEPHRINE HYDROCHLORIDE 200 MCG: 10 INJECTION, SOLUTION INTRAMUSCULAR; INTRAVENOUS; SUBCUTANEOUS at 11:33

## 2017-07-04 RX ADMIN — SODIUM CHLORIDE 1000 ML: 9 INJECTION, SOLUTION INTRAVENOUS at 08:04

## 2017-07-04 RX ADMIN — FENTANYL CITRATE 50 MCG: 50 INJECTION, SOLUTION INTRAMUSCULAR; INTRAVENOUS at 12:38

## 2017-07-04 RX ADMIN — VASOPRESSIN 1.2 UNITS/HR: 20 INJECTION, SOLUTION INTRAMUSCULAR; SUBCUTANEOUS at 12:05

## 2017-07-04 RX ADMIN — PHENYLEPHRINE HYDROCHLORIDE 200 MCG: 10 INJECTION, SOLUTION INTRAMUSCULAR; INTRAVENOUS; SUBCUTANEOUS at 11:15

## 2017-07-04 RX ADMIN — SODIUM CHLORIDE 1500 ML: 9 INJECTION, SOLUTION INTRAVENOUS at 07:50

## 2017-07-04 RX ADMIN — NOREPINEPHRINE BITARTRATE 6.4 MCG: 1 INJECTION INTRAVENOUS at 13:00

## 2017-07-04 RX ADMIN — FILGRASTIM 480 MCG: 480 INJECTION, SOLUTION INTRAVENOUS; SUBCUTANEOUS at 21:17

## 2017-07-04 RX ADMIN — MICAFUNGIN SODIUM 100 MG: 10 INJECTION, POWDER, LYOPHILIZED, FOR SOLUTION INTRAVENOUS at 11:08

## 2017-07-04 RX ADMIN — HYDROMORPHONE HYDROCHLORIDE 0.2 MG: 10 INJECTION, SOLUTION INTRAMUSCULAR; INTRAVENOUS; SUBCUTANEOUS at 07:31

## 2017-07-04 RX ADMIN — Medication 10 MG: at 11:20

## 2017-07-04 RX ADMIN — ALBUMIN (HUMAN) 12.5 G: 12.5 SOLUTION INTRAVENOUS at 23:57

## 2017-07-04 RX ADMIN — VASOPRESSIN 2 UNITS: 20 INJECTION, SOLUTION INTRAMUSCULAR; SUBCUTANEOUS at 11:27

## 2017-07-04 RX ADMIN — SODIUM CHLORIDE, POTASSIUM CHLORIDE, SODIUM LACTATE AND CALCIUM CHLORIDE: 600; 310; 30; 20 INJECTION, SOLUTION INTRAVENOUS at 08:33

## 2017-07-04 RX ADMIN — SODIUM CHLORIDE, POTASSIUM CHLORIDE, SODIUM LACTATE AND CALCIUM CHLORIDE 1000 ML: 600; 310; 30; 20 INJECTION, SOLUTION INTRAVENOUS at 09:45

## 2017-07-04 RX ADMIN — SODIUM CHLORIDE, POTASSIUM CHLORIDE, SODIUM LACTATE AND CALCIUM CHLORIDE: 600; 310; 30; 20 INJECTION, SOLUTION INTRAVENOUS at 14:55

## 2017-07-04 RX ADMIN — PIPERACILLIN AND TAZOBACTAM 3.38 G: 3; .375 INJECTION, POWDER, LYOPHILIZED, FOR SOLUTION INTRAVENOUS; PARENTERAL at 14:16

## 2017-07-04 RX ADMIN — SUCCINYLCHOLINE CHLORIDE 200 MG: 20 INJECTION, SOLUTION INTRAMUSCULAR; INTRAVENOUS at 11:05

## 2017-07-04 RX ADMIN — PIPERACILLIN AND TAZOBACTAM 3.38 G: 3; .375 INJECTION, POWDER, LYOPHILIZED, FOR SOLUTION INTRAVENOUS; PARENTERAL at 07:31

## 2017-07-04 RX ADMIN — NOREPINEPHRINE BITARTRATE 6.4 MCG: 1 INJECTION INTRAVENOUS at 12:12

## 2017-07-04 RX ADMIN — PHENYLEPHRINE HYDROCHLORIDE 200 MCG: 10 INJECTION, SOLUTION INTRAMUSCULAR; INTRAVENOUS; SUBCUTANEOUS at 11:09

## 2017-07-04 RX ADMIN — SODIUM CHLORIDE, POTASSIUM CHLORIDE, SODIUM LACTATE AND CALCIUM CHLORIDE 500 ML: 600; 310; 30; 20 INJECTION, SOLUTION INTRAVENOUS at 18:08

## 2017-07-04 RX ADMIN — PROPOFOL 80 MG: 10 INJECTION, EMULSION INTRAVENOUS at 11:05

## 2017-07-04 RX ADMIN — NOREPINEPHRINE BITARTRATE 0.03 MCG/KG/MIN: 1 INJECTION INTRAVENOUS at 11:29

## 2017-07-04 RX ADMIN — SODIUM CHLORIDE, POTASSIUM CHLORIDE, SODIUM LACTATE AND CALCIUM CHLORIDE: 600; 310; 30; 20 INJECTION, SOLUTION INTRAVENOUS at 11:26

## 2017-07-04 RX ADMIN — SODIUM CHLORIDE, POTASSIUM CHLORIDE, SODIUM LACTATE AND CALCIUM CHLORIDE: 600; 310; 30; 20 INJECTION, SOLUTION INTRAVENOUS at 22:18

## 2017-07-04 RX ADMIN — NOREPINEPHRINE BITARTRATE 6.4 MCG: 1 INJECTION INTRAVENOUS at 11:55

## 2017-07-04 RX ADMIN — INSULIN ASPART 1 UNITS: 100 INJECTION, SOLUTION INTRAVENOUS; SUBCUTANEOUS at 16:21

## 2017-07-04 RX ADMIN — SODIUM CHLORIDE, POTASSIUM CHLORIDE, SODIUM LACTATE AND CALCIUM CHLORIDE 500 ML: 600; 310; 30; 20 INJECTION, SOLUTION INTRAVENOUS at 16:47

## 2017-07-04 RX ADMIN — SODIUM BICARBONATE 50 MEQ: 84 INJECTION INTRAVENOUS at 12:25

## 2017-07-04 RX ADMIN — SODIUM CHLORIDE 500 ML: 9 INJECTION, SOLUTION INTRAVENOUS at 07:01

## 2017-07-04 RX ADMIN — PROPOFOL 30 MCG/KG/MIN: 10 INJECTION, EMULSION INTRAVENOUS at 17:34

## 2017-07-04 ASSESSMENT — PAIN DESCRIPTION - DESCRIPTORS
DESCRIPTORS: CONSTANT;SHARP
DESCRIPTORS: CONSTANT

## 2017-07-04 NOTE — PROGRESS NOTES
Admission          7/4/2017  4:45 AM  -----------------------------------------------------------  Reason for admission: Severe abdominal pain, N/V  Primary team notified of pt arrival.  Admitted from: OSH  Via: stretcher  Accompanied by: alone, EMS  Belongings: Placed in closet;   Admission Profile: complete  Teaching: orientation to unit and call light- call light within reach, call don't fall, use of console, meal times, when to call for the RN, and enforced importance of safety   Access: PIV  Telemetry:Placed on pt  Ht./Wt.: complete  2 RN Skin Assessment Completed By: Burton Martinez, RN and Joe Pearl RN  Pt status: In severe pain/discomfort. Constant urge to stool with no results. MD paged to bedside x2.     Temp:  [99.7  F (37.6  C)] 99.7  F (37.6  C)  Heart Rate:  [131-133] 131  Resp:  [40-42] 40  BP: (111-122)/(73-79) 111/79  SpO2:  [100 %] 100 %

## 2017-07-04 NOTE — IP AVS SNAPSHOT
MRN:[id number]                      After Visit Summary   7/4/2017    Camacho Bhagat    MRN: [id number]           Thank you!     Thank you for choosing [facility] for your care. Our goal is always to provide you with excellent care. Hearing back from our patients is one way we can continue to improve our services. Please take a few minutes to complete the written survey that you may receive in the mail after you visit with us. Thank you!        Patient Information     Date Of Birth          1965        Designated Caregiver       Most Recent Value    Caregiver    Will someone help with your care after discharge? no      About your hospital stay     You were admitted on:  July 4, 2017 You last received care in the:  [address] [facility]    You were discharged on:  September 8, 2017        Reason for your hospital stay       You were hospitalized for treatment of an infection in your abdomen.                  Who to Call     For medical emergencies, please call 911.  For non-urgent questions about your medical care, please call your primary care provider or clinic, [phone]  For questions related to your surgery, please call your surgery clinic        Attending Provider     Provider Specialty    Reshma Albert MD Surgery    [facility]    Jovan Tran MD Transplant       Primary Care Provider Office Phone # Fax #    Shay Kirkpatrick [phone]      After Care Instructions     Activity       Your activity upon discharge: activity as tolerated; continue to walk several times daily.            Diet       Follow this diet upon discharge: Low potassium diet; continue drinking Ensure Clear supplements as well.            Tubes and drains       You are going home with the following tubes or drains: 3 TABITHA drains and a ileostomy.   Instructions to care for your drains at home:  1. Irrigate 2 small drains (left and right) with 10mL of normal  saline twice daily.  2. Irrigate large drain with 20mL of normal saline twice daily.  3. Monitor and record drainage from each drain daily.  4. Change bandages on drains daily and as needed.  5. Please wear your abdominal binder at night to prevent your drains from being pulled out.                  Follow-up Appointments     Adult [facility] Follow-up and recommended labs and tests       1. You are scheduled to see Dr. Tran on Monday, September 11th at 11:10am in the clinic. You will have labs drawn prior to the appointment at 10:45am. We will plan for weekly clinic visits at this time.    2. We will check labs twice weekly, including a BMP, CBC, magnesium, phosphorus, and Tacrolimus level. Your liver (hepatic) panel should be checked weekly at this time.  ----You are scheduled to come to the Outpatient Lab in the [facility] @ [address] [place] [address]  Report results to [facility] Care Coordinator: Abril Khantana  Ph: [phone]  Fax: [phone]    3. We will check CMV quant levels weekly until 9/24, and then every other week for 2 months.    4. We will check EBV quant levels monthly, next due on 9/15.    5. You are scheduled for a sinogram to evaluate whether any of your abdominal drains can be removed on Friday, 9/22 at 11:00am in the clinic.    6. You will be scheduled for an appointment in the colorectal surgery clinic in 6-8 weeks to be evaluated for timing of your ileostomy reversal.    7. You will be scheduled to be seen in the hematology clinic for further workup of your anemia (low hemoglobin).          Appointments on [place] and/or [facility] (with [facility] provider or service). Call [phone] if you haven't heard regarding these appointments within 7 days of discharge.            Follow Up and recommended labs and tests       1. Please schedule an appointment with your pulmonologist (Dr. Cherry Magallon) and endocrinologist (Dr. Eckert) for follow-up of your sleep apnea and  diabetes.                  Your next 10 appointments already scheduled     Sep 11, 2017 10:15 AM CDT   LAB with  LAB    [facility] ([facility])    [address] [place] [id number] [phone]           Patient must bring picture ID. Patient should be prepared to give a urine specimen  Please do not eat 10-12 hours before your appointment if you are coming in fasting for labs on lipids, cholesterol, or glucose (sugar). Pregnant women should follow their Care Team instructions. Water with medications is okay. Do not drink coffee or other fluids. If you have concerns about taking  your medications, please ask at office or if scheduling via [employer], send a message by clicking on Secure Messaging, Message Your Care Team.            Sep 11, 2017 10:45 AM CDT   LAB with  LAB    [facility] ([facility])    [address] [place] [id number] [phone]           Patient must bring picture ID. Patient should be prepared to give a urine specimen  Please do not eat 10-12 hours before your appointment if you are coming in fasting for labs on lipids, cholesterol, or glucose (sugar). Pregnant women should follow their Care Team instructions. Water with medications is okay. Do not drink coffee or other fluids. If you have concerns about taking  your medications, please ask at office or if scheduling via [employer], send a message by clicking on Secure Messaging, Message Your Care Team.            Sep 11, 2017 11:10 AM CDT   (Arrive by 10:55 AM)   Liver Return Post Op with Jovan Tran MD   [facility] Solid Organ Transplant ([facility])    [address] [place] [id number] [phone]            Sep 14, 2017 11:00 AM CDT   LAB with  LAB    [facility] ([facility])    [address] [place] [id number] [phone]            Patient must bring picture ID. Patient should be prepared to give a urine specimen  Please do not eat 10-12 hours before your appointment if you are coming in fasting for labs on lipids, cholesterol, or glucose (sugar). Pregnant women should follow their Care Team instructions. Water with medications is okay. Do not drink coffee or other fluids. If you have concerns about taking  your medications, please ask at office or if scheduling via [employer], send a message by clicking on Secure Messaging, Message Your Care Team.            Sep 18, 2017 11:00 AM CDT   LAB with [email or web address] LAB   [facility] ([facility])    [address] [place] [phone]           Patient must bring picture ID. Patient should be prepared to give a urine specimen  Please do not eat 10-12 hours before your appointment if you are coming in fasting for labs on lipids, cholesterol, or glucose (sugar). Pregnant women should follow their Care Team instructions. Water with medications is okay. Do not drink coffee or other fluids. If you have concerns about taking  your medications, please ask at office or if scheduling via [employer], send a message by clicking on Secure Messaging, Message Your Care Team.            Sep 20, 2017  2:00 PM CDT   Lab with  LAB    [facility] ([facility])    [address] [place] [id number] [phone]            Sep 20, 2017  3:00 PM CDT   (Arrive by 2:30 PM)   New Patient Visit with Tl Higginbotham MD   [facility] Nephrology ([facility])    [address] [place] [phone]   [phone]            Sep 21, 2017 11:15 AM CDT   LAB with [email or web address] LAB   [facility] ([facility])    [address] [place] [phone]           Patient must bring picture ID. Patient should be prepared to give a urine  specimen  Please do not eat 10-12 hours before your appointment if you are coming in fasting for labs on lipids, cholesterol, or glucose (sugar). Pregnant women should follow their Care Team instructions. Water with medications is okay. Do not drink coffee or other fluids. If you have concerns about taking  your medications, please ask at office or if scheduling via [employer], send a message by clicking on Secure Messaging, Message Your Care Team.            Sep 22, 2017 11:00 AM CDT   (Arrive by 10:45 AM)   IR SINOGRAM INJECTION DIAGNOSTIC with UCXR3, UC IMAGING NURSE   [facility] Xray ([facility])    [address] [address] [facility]  1st Floor  [facility] [address] [phone]           1. You will need to have had a history and physical exam within 7 days of the procedure. 2. Laboratory test are to be obtained by your doctor prior to the exam (CBCP, INR and PTT) 3. Someone will need to drive you to and from the hospital. 4. If you are or may be pregnant, contact your doctor or a Radiology nurse prior to the day of the exam. 5. If you have diabetes, check with your doctor or a Radiology nurse to see if your insulin needs to be adjusted for the exam. 6. If you are taking Coumadin (to thin you blood) please contact your doctor or a Radiology nurse at least 3 days before the exam for special instructions. 7. The day before your exam you may eat your regular diet and are encouraged to drink at least 2 quarts of clear liquids. Drink no alcoholic beverages for 24 hours prior to the exam. 8. Do not eat any solid food or milk products for 6 hours prior to the exam. You may drink clear liquids until 2 hours prior to the exam. Clear liquids include the following: water, Jell-O, clear broth, apple juice or any noncarbonated drink that you can see through (no pop!) 9. The morning of the exam you may brush your teeth and take medications as directed with a sip of water. 10. Tell the Radiology  nurse if you have any allergies.              Additional Services     Colorectal Surgery Referral       Please schedule the patient in clinic in 6-8 weeks to evaluate him for timing of ileostomy reversal.            Home care nursing referral       [facility]  Ph: [phone] #2  [facility]  Fax: [phone]    If you decide you would like a home nurse, please call and inform [facility]    ++++Home visits address++++  [address] [place] [address] cell: [phone]      RN skilled nursing visit. RN to assess vital signs and weight, respiratory and cardiac status, patients ability to take and record daily blood pressure, temp and weight, pain level and activity tolerance, incision for signs/symptoms of infection, hydration, nutrition and bowel status and home safety.  RN to teach medication management and    oversight with 3 abdominal drains and irrigation  RN to provide tube site care and management and morning lab draws, per MD orders and report results to Outpatient Care Coordinator: lulu soniatana  Ph: [phone]  Fax: [phone]    Your provider has ordered home care nursing services. If you have not been contacted within 2 days of your discharge please call the inpatient department phone number at [phone] .            ONC/HEME ADULT REFERRAL       Your provider has referred you to: [facility]: [facility] [phone]   [email or web address]  [facility]    Please be aware that coverage of these services is subject to the terms and limitations of your health insurance plan.  Call member services at your health plan with any benefit or coverage questions.      Please bring the following with you to your appointment:    (1) Any X-Rays, CTs or MRIs which have been performed.  Contact the facility where they were done to arrange for  prior to your scheduled appointment.   (2) List of current medications  (3) This referral request   (4) Any  documents/labs given to you for this referral                  Further instructions from your care team       ________________________________________________________  Discharge RN please fax discharge orders to home care agency: [facility]  ________________________________________________________      ________________________________________________________________________          [facility]              Name: Camacho Bhagat  Date: 9/5/17  To order your ostomy supplies     Call:                                                            [facility]  Ph. [phone] ext-[phone]     Your Medical Supplier will need your insurance information and surgeon's name, phone and fax number     Clinic:                                                                                                 Phone                                              Fax  Colorectal Surgery:                                                             [phone]  Verbal Order X 1 Month per:                                                                                               Johnna Hansen RN CWOCN                                                                        Authorizing MD: Sara Huffman MD     Request the following supplies:       Kyara   1 piece flat fecal pouch with filter opaque with viewing option ( # 6334)                                                                     Accessories               2  barrier ring #[id number]                                                                                               Adapt powder #7906        Change your pouch twice a week, more often if leaking.    If you are cutting a hole in the wafer of your pouch, recheck stoma size and adjust pouch opening as needed every week     . Call the Ostomy Nurse at [facility]                                                                                                [address] [facility], [place] : [phone]   Schedule a follow-up visit in 4 to 6 weeks after your surgery, sooner if having problems Bring a complete set of pouch-changing supplies to this visit       Problems you should Report  - The stoma turns blue or darker in color.  - Cuts or sores around the stoma.  - Red, raw or painful skin around the stoma.  - Any bulging of the skin around the stoma.  - A pouch that leaks every day.  - Problems making the right size hole in the pouch wafer.    Please call with any questions or concerns.    Camacho--[employer] has to ship the supplies to your [facility]--they will be shipped by early next week 9/11 or by 9/12    Pending Results     Date and Time Order Name Status Description    9/7/2017 1501 Haptoglobin In process     7/15/2017 1553 Pneumocystis jirovecil by PCR In process     7/14/2017 1433 AFB Culture Non Blood Preliminary             Statement of Approval     Ordered          09/08/17 1406  I have reviewed and agree with all the recommendations and orders detailed in this document.  EFFECTIVE NOW     Approved and electronically signed by:  Felicia Tolliver APRN CNP             Admission Information     Date & Time Provider Department Dept. Phone    7/4/2017 Jovan Tran MD [address] [facility] [phone]      Your Vitals Were     Blood Pressure Pulse Temperature Respirations Height Weight    161/92 (BP Location: Left arm) 93 98.8  F (37.1  C) (Oral) 16 1.829 m (6') 93.1 kg (205 lb 3.2 oz)    Pulse Oximetry BMI (Body Mass Index)                92% 27.83 kg/m2          MyChart Information     [employer] gives you secure access to your electronic health record. If you see a primary care provider, you can also send messages to your care team and make appointments. If you have questions, please call your primary care clinic.  If you do not have a primary care provider, please call  [phone] and they will assist you.        Care EveryWhere ID     This is your Care EveryWhere ID. This could be used by other organizations to access your [place] medical records  [id number]        Equal Access to Services     FRANKLIN PORTILLO : Hadii aad ku hadnairadha Orestes, wakpda luqadaha, qaybta kaalmada adesammi, devi joneschristal learyelena cross rafa duckworth. So [facility] [phone].    ATENCIÓN: Si habla español, tiene a zheng disposición servicios gratuitos de asistencia lingüística. Llame al [phone].    We comply with applicable federal civil rights laws and [place] laws. We do not discriminate on the basis of race, color, national origin, age, disability sex, sexual orientation or gender identity.               Review of your medicines      START taking        Dose / Directions    acetaminophen 325 MG tablet   Commonly known as:  TYLENOL   Used for:  Osteoarthritis, unspecified osteoarthritis type, unspecified site        Dose:  650 mg   Take 2 tablets (650 mg) by mouth every 4 hours as needed for mild pain   Quantity:  100 tablet   Refills:  3       furosemide 20 MG tablet   Commonly known as:  LASIX   Used for:  Hyperkalemia        Dose:  20 mg   Start taking on:  9/9/2017   Take 1 tablet (20 mg) by mouth every other day   Quantity:  30 tablet   Refills:  3       loperamide 2 MG capsule   Commonly known as:  IMODIUM   Used for:  S/P right hemicolectomy        Dose:  4 mg   Take 2 capsules (4 mg) by mouth 2 times daily   Quantity:  120 capsule   Refills:  3       miconazole with skin protectant 2 % Crea cream   Used for:  Liver transplant recipient (H)        Apply topically 2 times daily   Quantity:  1 Tube   Refills:  0       pantoprazole 40 MG EC tablet   Commonly known as:  PROTONIX        Dose:  40 mg   Take 1 tablet (40 mg) by mouth daily   Quantity:  30 tablet   Refills:  5       sodium bicarbonate 650 MG tablet   Used for:  Liver transplant recipient (H)        Dose:  650 mg   Take 1 tablet (650  mg) by mouth 3 times daily   Quantity:  90 tablet   Refills:  5       sodium chloride (PF) 0.9% PF flush   Used for:  Intra-abdominal abscess (H)        Dose:  10 mL   10 mLs by Intracatheter route 2 times daily   Quantity:  1200 mL   Refills:  1         CONTINUE these medicines which may have CHANGED, or have new prescriptions. If we are uncertain of the size of tablets/capsules you have at home, strength may be listed as something that might have changed.        Dose / Directions    amLODIPine 5 MG tablet   Commonly known as:  NORVASC   This may have changed:  how much to take   Used for:  History of liver transplant (H), Elevated blood pressure reading, Long-term use of immunosuppressant medication        Dose:  10 mg   Take 2 tablets (10 mg) by mouth daily   Quantity:  60 tablet   Refills:  3       * insulin aspart 100 UNIT/ML injection   Commonly known as:  NovoLOG FLEXPEN   This may have changed:    - how much to take  - how to take this  - when to take this  - additional instructions   Used for:  Type 2 diabetes mellitus with diabetic polyneuropathy, unspecified long term insulin use status (H)        Dose:  1-10 Units   Inject 1-10 Units Subcutaneous 3 times daily (with meals) Pre-Meal  - 164 give 1 unit.   - 189 give 2 units.   - 214 give 3 units.   - 239 give 4 units.   - 264 give 5 units.   - 289 give 6 units.   - 314 give 7 units.   - 339 give 8 units.   - 364 give 9 units. BG greater than or equal to 365 give 10 units   Quantity:  3 mL   Refills:  3       * insulin aspart 100 UNIT/ML injection   Commonly known as:  NovoLOG FLEXPEN   This may have changed:    - when to take this  - additional instructions   Used for:  Type 2 diabetes mellitus with diabetic polyneuropathy, unspecified long term insulin use status (H)        Dose:  1-7 Units   Inject 1-7 Units Subcutaneous At Bedtime  - 224 give 1 units.   - 249 give 2 units.   - 274  give 3 units.   - 299 give 4 units.   - 324 give 5 units.   - 349 give 6 units.  BG greater than or equal to 350 give 7 units.   Quantity:  3 mL   Refills:  3       * insulin aspart 100 UNIT/ML injection   Commonly known as:  NovoLOG PEN   This may have changed:    - how much to take  - how to take this  - when to take this  - additional instructions   Used for:  Liver transplant recipient (H)        DOSE:  1 units per 10 grams of carbohydrate. With meals and snacks. Only chart total amount of units given.  Do not give if pre-prandial glucose is less than 60 mg/dL.   Quantity:  3 mL   Refills:  3       insulin glargine 100 UNIT/ML injection   Commonly known as:  LANTUS   This may have changed:    - how much to take  - when to take this   Used for:  Liver transplant recipient (H)        Dose:  5 Units   Inject 5 Units Subcutaneous At Bedtime   Quantity:  3 mL   Refills:  1       * tacrolimus 1 MG capsule   Commonly known as:  GENERIC EQUIVALENT   This may have changed:  additional instructions   Used for:  Liver transplant recipient (H)        Take 1.5mg twice daily.   Quantity:  60 capsule   Refills:  11       * tacrolimus 0.5 MG capsule   Commonly known as:  GENERIC EQUIVALENT   This may have changed:  You were already taking a medication with the same name, and this prescription was added. Make sure you understand how and when to take each.   Used for:  Liver transplant recipient (H)        Take 1.5mg twice daily.   Quantity:  60 capsule   Refills:  11       * Notice:  This list has 5 medication(s) that are the same as other medications prescribed for you. Read the directions carefully, and ask your doctor or other care provider to review them with you.      CONTINUE these medicines which have NOT CHANGED        Dose / Directions    cyclobenzaprine 10 MG tablet   Commonly known as:  FLEXERIL   Used for:  Immunosuppression (H)        Dose:  10 mg   Take 1 tablet (10 mg) by mouth 3 times daily as  needed for muscle spasms   Quantity:  90 tablet   Refills:  0       fludrocortisone 0.1 MG tablet   Commonly known as:  FLORINEF   Used for:  History of liver transplant (H), Long-term use of immunosuppressant medication        Dose:  0.1 mg   Take 1 tablet (0.1 mg) by mouth daily For elevated potassium   Quantity:  30 tablet   Refills:  3       ondansetron 4 MG ODT tab   Commonly known as:  ZOFRAN-ODT   Used for:  Cirrhosis of liver with ascites, unspecified hepatic cirrhosis type (H), Nausea        Dose:  4 mg   Take 1 tablet (4 mg) by mouth every 8 hours as needed for nausea   Quantity:  30 tablet   Refills:  0       prochlorperazine 5 MG tablet   Commonly known as:  COMPAZINE   Used for:  Aftercare following organ transplant, Status post liver transplantation (H)        Dose:  5-10 mg   Take 1-2 tablets (5-10 mg) by mouth every 6 hours as needed for nausea or vomiting   Quantity:  90 tablet   Refills:  0       tadalafil 5 MG tablet   Commonly known as:  CIALIS   Used for:  Drug-induced erectile dysfunction        Dose:  5 mg   Take 1 tablet (5 mg) by mouth daily Never use with nitroglycerin, terazosin or doxazosin.   Quantity:  30 tablet   Refills:  11         STOP taking     aspirin 325 MG tablet           clotrimazole 10 MG Lozg lozenge   Commonly known as:  MYCELEX           gabapentin 300 MG capsule   Commonly known as:  NEURONTIN           glucagon 1 MG Solr injection           LACTOBACILLUS EXTRA STRENGTH Caps           lidocaine 5 % Patch   Commonly known as:  LIDODERM           magnesium oxide 400 MG tablet   Commonly known as:  MAG-OX           multivitamin, therapeutic with minerals Tabs tablet           mycophenolate 250 MG capsule   Commonly known as:  GENERIC EQUIVALENT           omeprazole 20 MG CR capsule   Commonly known as:  priLOSEC           oxyCODONE 5 MG IR tablet   Commonly known as:  ROXICODONE           senna-docusate 8.6-50 MG per tablet   Commonly known as:  SENOKOT-S;PERICOLACE            sulfamethoxazole-trimethoprim 400-80 MG per tablet   Commonly known as:  BACTRIM/SEPTRA           TRADJENTA PO           valGANciclovir 450 MG tablet   Commonly known as:  VALCYTE                Where to get your medicines      These medications were sent to [facility] - [place], [place] - [address] [facility]  [address] [facility] [address]     Phone:  [phone]     acetaminophen 325 MG tablet    amLODIPine 5 MG tablet    cyclobenzaprine 10 MG tablet    fludrocortisone 0.1 MG tablet    furosemide 20 MG tablet    insulin glargine 100 UNIT/ML injection    loperamide 2 MG capsule    miconazole with skin protectant 2 % Crea cream    pantoprazole 40 MG EC tablet    sodium bicarbonate 650 MG tablet    tacrolimus 0.5 MG capsule    tacrolimus 1 MG capsule         Some of these will need a paper prescription and others can be bought over the counter. Ask your nurse if you have questions.     Bring a paper prescription for each of these medications     insulin aspart 100 UNIT/ML injection    insulin aspart 100 UNIT/ML injection    insulin aspart 100 UNIT/ML injection    sodium chloride (PF) 0.9% PF flush                Protect others around you: Learn how to safely use, store and throw away your medicines at www.disposemymeds.org.             Medication List: This is a list of all your medications and when to take them. Check marks below indicate your daily home schedule. Keep this list as a reference.      Medications           Morning Afternoon Evening Bedtime As Needed    acetaminophen 325 MG tablet   Commonly known as:  TYLENOL   Take 2 tablets (650 mg) by mouth every 4 hours as needed for mild pain   Last time this was given:  650 mg on 9/5/2017  4:35 PM                                amLODIPine 5 MG tablet   Commonly known as:  NORVASC   Take 2 tablets (10 mg) by mouth daily   Last time this was given:  10 mg on 9/8/2017  8:10 AM                                cyclobenzaprine 10 MG  tablet   Commonly known as:  FLEXERIL   Take 1 tablet (10 mg) by mouth 3 times daily as needed for muscle spasms   Last time this was given:  10 mg on 9/4/2017  2:19 PM                                fludrocortisone 0.1 MG tablet   Commonly known as:  FLORINEF   Take 1 tablet (0.1 mg) by mouth daily For elevated potassium   Last time this was given:  0.1 mg on 9/8/2017  8:11 AM                                furosemide 20 MG tablet   Commonly known as:  LASIX   Take 1 tablet (20 mg) by mouth every other day   Start taking on:  9/9/2017   Last time this was given:  20 mg on 9/8/2017  8:10 AM                                * insulin aspart 100 UNIT/ML injection   Commonly known as:  NovoLOG FLEXPEN   Inject 1-10 Units Subcutaneous 3 times daily (with meals) Pre-Meal  - 164 give 1 unit.   - 189 give 2 units.   - 214 give 3 units.   - 239 give 4 units.   - 264 give 5 units.   - 289 give 6 units.   - 314 give 7 units.   - 339 give 8 units.   - 364 give 9 units. BG greater than or equal to 365 give 10 units   Last time this was given:  5 Units on 9/8/2017  1:07 PM                                * insulin aspart 100 UNIT/ML injection   Commonly known as:  NovoLOG FLEXPEN   Inject 1-7 Units Subcutaneous At Bedtime  - 224 give 1 units.   - 249 give 2 units.   - 274 give 3 units.   - 299 give 4 units.   - 324 give 5 units.   - 349 give 6 units.  BG greater than or equal to 350 give 7 units.   Last time this was given:  5 Units on 9/8/2017  1:07 PM                                * insulin aspart 100 UNIT/ML injection   Commonly known as:  NovoLOG PEN   DOSE:  1 units per 10 grams of carbohydrate. With meals and snacks. Only chart total amount of units given.  Do not give if pre-prandial glucose is less than 60 mg/dL.   Last time this was given:  5 Units on 9/8/2017  1:07 PM                                insulin glargine 100 UNIT/ML  injection   Commonly known as:  LANTUS   Inject 5 Units Subcutaneous At Bedtime   Last time this was given:  5 Units on 9/7/2017 10:22 PM                                loperamide 2 MG capsule   Commonly known as:  IMODIUM   Take 2 capsules (4 mg) by mouth 2 times daily   Last time this was given:  4 mg on 9/8/2017  8:10 AM                                miconazole with skin protectant 2 % Crea cream   Apply topically 2 times daily   Last time this was given:  9/7/2017  8:08 PM                                ondansetron 4 MG ODT tab   Commonly known as:  ZOFRAN-ODT   Take 1 tablet (4 mg) by mouth every 8 hours as needed for nausea                                pantoprazole 40 MG EC tablet   Commonly known as:  PROTONIX   Take 1 tablet (40 mg) by mouth daily   Last time this was given:  40 mg on 9/8/2017  8:10 AM                                prochlorperazine 5 MG tablet   Commonly known as:  COMPAZINE   Take 1-2 tablets (5-10 mg) by mouth every 6 hours as needed for nausea or vomiting                                sodium bicarbonate 650 MG tablet   Take 1 tablet (650 mg) by mouth 3 times daily   Last time this was given:  650 mg on 9/8/2017  8:10 AM                                sodium chloride (PF) 0.9% PF flush   10 mLs by Intracatheter route 2 times daily   Last time this was given:  20 mLs on 9/8/2017 10:13 AM                                * tacrolimus 1 MG capsule   Commonly known as:  GENERIC EQUIVALENT   Take 1.5mg twice daily.   Last time this was given:  1 mg on 9/8/2017  8:10 AM                                * tacrolimus 0.5 MG capsule   Commonly known as:  GENERIC EQUIVALENT   Take 1.5mg twice daily.   Last time this was given:  1 mg on 9/8/2017  8:10 AM                                tadalafil 5 MG tablet   Commonly known as:  CIALIS   Take 1 tablet (5 mg) by mouth daily Never use with nitroglycerin, terazosin or doxazosin.                                * Notice:  This list has 5 medication(s)  that are the same as other medications prescribed for you. Read the directions carefully, and ask your doctor or other care provider to review them with you.

## 2017-07-04 NOTE — PROGRESS NOTES
D; admitted to unit 4a from O.R. Intubated with a 8.0 ETT secured 24 @ the lip  I: pt placed on a drager ventilator with settings per M.D.  A; bilateral breath sounds  P: cont to monitor

## 2017-07-04 NOTE — PROGRESS NOTES
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     Graft Function: Good, T Bili 0.3 on admission, stable. Alk phos 44. INR 1.87, elevated from 3/2017 (1.16). Lactate on admission 4.0 now rising to 8.7 despite aggressive fluid resuscitation. Has received 500 cc fluid bolus x3, will bolus 1 L NS now and continue fluid resuscitation. Given worsening abdominal exam, hypotension and increasing Lactate (4 > 8.7) taken to OR for exploratory laparotomy; findings demonstrating typhlitis of the right colon, no obvious ischemic or nonviable bowel. Abdomen left open for interval reexploration and likely closure tomorrow AM.   Immunosuppression Management: Fludricortisone 0.1 mg po daily. Tac level on admission 9.9, continue Prograf 4 mg AM, 3 mg PM dosing. Hold cellcept now for the leukopenia.    Cardiorespiratory: Intubated given open abdomen, will remain intubated overnight prior to reexploration.   Hematology: Hgb 9.6 on admission, stable. No signs of GI bleeding. Repeat CBC at 1800. Started Neupogen given today given neutropenia and findings of typhlitis.   Access: PIV x2, Central Line R IJ, R radial arterial line  GI: CT from [facility] demonstrating right sided colonic inflammation, AXR on admission with dilated loop of bowel in central abdomen, felt likely to sigmoid. Initial impression consistent with Typhlitis however given worsening abdominal pain on examination, hypotension and increasing lactate levels, elected to proceed with exploratory laparotomy. Continue NPO. Will obtain Liver US during this admission.   Fluid/Electrolytes: MIVF:at 150 ml/hr, replacing  Ca, Mg per protocols.  Endocrine: DM II, on medium SSI.  : Creatinine increased to 1.56 from admission 7/4. Crowe in place.   Infectious disease: Tmax 99.7, BPs drifting down this morning /52-79, appropriately tachycardic in response to this. Started on Zosyn, Vancomycin, Flagyl, Micafungin on admission (7/4/2017), plan to continue given intraoperative findings. Neupogen ordered given typhlitis and neutropenia. Continue Valcyte 450 mg. Blood cultures obtained 7/4 currently pending. Abdominal fluid culture collected intraoperatively sent for Gram stain, fungal and bacterial culture. Serial lactates q 4 hours while in ICU.   Prophylaxis: GI, Viral, Fungal.   Disposition: Transfer to ICU, back to OR tomorrow AM for reexploration.     Medical Decision Making: Medium  Admit 95775 (moderate level decision making)    PILLO/Fellow/Resident Provider: Eric Dacosta    Faculty: Tip Zhang M.D.    __________________________________________________________________  Transplant History: Admitted 7/4/2017 for sepsis secondary to colitis.  3/4/2017 DD OLT with Dr. Tran (Liver), Postoperative day: 122     Interval History: History is obtained from the patient  Overnight events: Admitted with worsening abdominal pain, pain this morning is very bad per patient, writhing in bed. One formed bowel movement on admission, none since, no diarrhea, no vomiting.     ROS:   A 10-point review of systems was negative except as noted above.    Meds:    fludrocortisone  0.1 mg Oral Daily     tacrolimus  4 mg Oral QAM     valGANciclovir  450 mg Oral Daily     insulin aspart  1-6 Units Subcutaneous Q4H     piperacillin-tazobactam  3.375 g Intravenous Q6H     tacrolimus  3 mg Oral Daily at 8 pm     vancomycin (VANCOCIN) IV  1,750 mg Intravenous Q12H     sodium chloride 0.9%  1,500 mL Intravenous Once     HYDROmorphone  0.2 mg Intravenous Once     metroNIDAZOLE  500 mg Intravenous Q8H     micafungin  100 mg Intravenous Q24H      magnesium sulfate  2 g Intravenous Once     calcium gluconate  1 g Intravenous Once       Physical Exam:     Admit Weight: 94.2 kg (207 lb 11.2 oz) (SCALE 2)    Current vitals:   /56  Temp 98.5  F (36.9  C) (Oral)  Resp 26  Ht 1.829 m (6')  Wt 94.2 kg (207 lb 11.2 oz)  SpO2 97%  BMI 28.17 kg/m2         Vital sign ranges:    Temp:  [98.5  F (36.9  C)-99.7  F (37.6  C)] 98.5  F (36.9  C)  Heart Rate:  [112-133] 114  Resp:  [26-42] 26  BP: ()/(45-79) 102/56  SpO2:  [86 %-100 %] 97 %  Patient Vitals for the past 24 hrs:   BP Temp Temp src Heart Rate Resp SpO2 Height Weight   07/04/17 0835 102/56 - - 114 26 - - -   07/04/17 0810 90/53 - - 114 - 97 % - -   07/04/17 0805 (!) 80/45 - - - - 94 % - -   07/04/17 0800 (!) 77/45 - - 112 - (!) 86 % - -   07/04/17 0745 91/63 - - 118 (!) 40 98 % - -   07/04/17 0736 90/57 - - 128 - 100 % - -   07/04/17 0733 (!) 80/49 - - - - - - -   07/04/17 0730 - 98.5  F (36.9  C) Oral 112 (!) 36 98 % - -   07/04/17 0700 (!) 84/55 - - - - - - -   07/04/17 0630 (!) 87/58 - - - - - - -   07/04/17 0618 92/60 98.5  F (36.9  C) Oral 126 - - - -   07/04/17 0515 111/79 - - 131 (!) 40 - - -   07/04/17 0452 122/73 99.7  F (37.6  C) Oral 133 (!) 42 100 % 1.829 m (6') 94.2 kg (207 lb 11.2 oz)     General Appearance: in mild distress.   Skin: normal, warm, dry  Heart: tachycardic, normal S1 and S2  Lungs: clear to auscultation  Abdomen: The abdomen is distended, and  moderately tender, generalized. Incision from prior Liver transplant is noted and well healed.   : vazquez is present.  The genitalia is not edematous. Urine has no gross hematuria.   Extremities: edema: absent.  Neurologic: awake and alert. Tremor absent..     Data:   CMP  Recent Labs  Lab 07/04/17  0735 07/03/17  1033   * 136   POTASSIUM 3.9 5.4*   CHLORIDE 121* 108   CO2 13* 23   * 212*   BUN 30 32*   CR 1.56* 1.12   GFRESTIMATED 47* 69   GFRESTBLACK 57* 83   JACOB 5.6* 8.3*   MAG 1.4* 1.9   PHOS 1.7* 3.7    ALBUMIN 1.9* 3.4   BILITOTAL 0.3 0.5   ALKPHOS 44 104   AST 17 34   ALT 12 26     CBC  Recent Labs  Lab 07/04/17  0735 07/03/17  1033   HGB 9.6* 9.3*   WBC 1.1* 0.8*   PLT 95* 71*     COAGS  Recent Labs  Lab 07/04/17  0735   INR 1.87*      Urinalysis  Recent Labs   Lab Test  06/14/17   1508  06/06/17   1605  03/24/17   1315   04/11/16   1345   COLOR   --   Yellow  Yellow   < >  Yellow   APPEARANCE   --   Slightly Cloudy  Clear   < >  Clear   URINEGLC   --   50*  Negative   < >  30*   URINEBILI   --   Negative  Negative   < >  Negative   URINEKETONE   --   Negative  Negative   < >  Negative   SG   --   1.015  1.009   < >  1.016   UBLD   --   Negative  Negative   < >  Small*   URINEPH   --   5.0  5.5   < >  5.0   PROTEIN   --   100*  Negative   < >  30*   NITRITE   --   Negative  Negative   < >  Negative   LEUKEST   --   Negative  Negative   < >  Negative   RBCU   --   2  <1   < >  1   WBCU   --   1  1   < >  1   UTPG  1.55*   --    --    --   0.41*    < > = values in this interval not displayed.     Cultures:   Blood Cultures x2 7/4/2017 Pending     Abdominal Fluid Culture 7/4/2017: Pending     CT scan:    7/4/2017 CONCLUSION:   1. Right colonic wall thickening suggesting colitis. Follow-up is necessary to confirm resolution in order to exclude colonic mass.  2. Transverse colon is not well distended reducing evaluation for wall thickening.  3. Small amount of ascites.  4. Splenomegaly.  5. Prominent portal and splenic veins. If confirmation of vascular patency is indicated consider follow-up ultrasound of the liver with Doppler.  6. Small right pleural effusion.  7. Stranding in the subcutaneous fat of the ventral pelvis.     Abdominal XR 7/4/2017:   1. No evidence of pneumoperitoneum.  2. Dilated loop of bowel in the central abdomen, likely sigmoid.

## 2017-07-04 NOTE — IP AVS SNAPSHOT
[address] [address]    Phone:  [phone]                                       After Visit Summary   7/4/2017    Camacho Bhagat    MRN: [id number]           After Visit Summary Signature Page     I have received my discharge instructions, and my questions have been answered. I have discussed any challenges I see with this plan with the nurse or doctor.    ..........................................................................................................................................  Patient/Patient Representative Signature      ..........................................................................................................................................  Patient Representative Print Name and Relationship to Patient    ..................................................               ................................................  Date                                            Time    ..........................................................................................................................................  Reviewed by Signature/Title    ...................................................              ..............................................  Date                                                            Time

## 2017-07-04 NOTE — PROVIDER NOTIFICATION
Notified Dr Dacosta about low calcium and low magnesium . New order of magnsium and calcium gluconate  issued .

## 2017-07-04 NOTE — ANESTHESIA PROCEDURE NOTES
Arterial Line Procedure Note    Location: In OR After Induction  Procedure Start/Stop Times:     patient identified, IV checked, risks and benefits discussed and monitors and equipment checked      Correct Patient: Yes      Correct Position: Yes      Correct Site: Yes      Correct Procedure: Yes      Correct Laterality:  Yes  Line Placement:     Procedure:  Arterial Line    Insertion Site:  Radial    Insertion laterality:  Right    Skin Prep: Chloraprep      Patient Prep: patient draped, mask, sterile gloves, sterile gown, hat and hand hygiene      Local skin infiltration:  None    Ultrasound Guided?: No      Catheter size:  20 gauge, 12 cm    Cath secured with: suture      Dressing:  Tegaderm    Complications:  None obvious    Arterial waveform: Yes      IBP within 10% of NIBP: Yes    Assessment/Narrative:      Arterial line is performed by surgeon using quick cath.

## 2017-07-04 NOTE — OP NOTE
Transplant Surgery  Operative Note    Preop Dx:  Acute abdomen possible gangrenous bowel  Postop Dx: typhlitis  Procedure: Exploratory laparotomy and wash out  Surgeon: Tip Zhang M.D.  Assistant: Olesya (Fellow), Miguel Best (Student)  Anesthesia: General  EBL: 5 ml  Fluids: crystalloid 1000ml and Colloid 500ml  UO: 200  Drains: none  Specimen: Fluid from peritoneal cavity for gram stain and cultures.  Complications: None  Findings:  Inflamed caecum and ascending colon  Indication: The patient has severe abdominal pain suspicious of possible gangrene / perforation of bowel 2/2 typhlitis, lactic acidosis.  After discussing the risks and benefits of surgery and potential complications, the patient provided informed consent.     DETAILS OF PROCEDURE:  The patient was brought to the operating room, placed in a supine position.  Perioperative prophylactic IV antibiotics were given.  Anesthesia was adminisitered. The abdomen was prepped and draped in the usual sterile fashion.  Time out was performed.    Upper midline incision, slightly hazy fluid seen in the peritoneal cavity, sample sent for gram stain, cultures. Exploration of the abdominal cavity revealed distended but otherwise normal sigmoid colon, rest of the bowel was not distended. The caecum, asceding colon and the adjacent mesentry was inflammed but no clear evidence of bowel perforation or gangrene.  There were patchy areas of ischemia and hemorrhage.  We requested intraoperative assessment from the colorectal surgery service.  Dr. Willingham evaluated colonic changes and recommended against resection with a plan of second look operation. The transplanted liver looked good, well perfused and soft. The abdominal cavity was irrigated with 3 Litres of warm saline and then Abthera applied over the bowel followed by Vac dressing to occlude the incision. Plan to take him back tomorrow morning for a re look surgery to rule out need for any bowel  resection.    All needle, sponge, and instrument counts were accurate.  The patient tolerated the procedure well without apparent complications and was trasfered to the PACU in good condition.  Faculty was present for critical portions of the procedure.

## 2017-07-04 NOTE — H&P
SURGICAL ICU ADMISSION NOTE  July 4, 2017      PRIMARY TEAM: Transplant  PRIMARY PHYSICIAN: Dr. Zhang    REASON FOR CRITICAL CARE ADMISSION: Sepsis, pressors, open abdomen   ADMITTING PHYSICIAN: Dr. Albert    HISTORY PRESENTING ILLNESS: Camacho Bhagat is a 51 year old male with PMH significant for liver transplant March 2017 2/2 to [facility] from [place] admitted to [facility] for abdominal pain and fever. Admitted to SICU following exploratory laparotomy and washout for concerns of neutropenic colitis    Review of systems: Unable to obtain 2/2 sedation/intubation, noted to have abdominal pain on admission.    PAST MEDICAL HISTORY:  Past Medical History:   Diagnosis Date     Cancer (H)      Depressive disorder, not elsewhere classified      Esophageal reflux      Fibromyalgia 1/2009    dx with Dr Benitez( Rheum)     History of thrombophlebitis      Osteoarthritis      Other acute embolism veins 11/01    Deep vein thrombophlebitis, filter placed     Other and unspecified hyperlipidemia      Other chronic nonalcoholic liver disease     Fatty liver      Other testicular hypofunction      Pneumonia, organism 10-01    Included ARDS, sepsis, and  acute renal failure; hospitalized     Rheumatoid arthritis(714.0)      Type II or unspecified type diabetes mellitus without mention of complication, not stated as uncontrolled 11/01    Managed by endocrinology     Unspecified essential hypertension 11/01    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     Unspecified sleep apnea     Uses BiPAP       PAST SURGICAL HISTORY:  Past Surgical History:   Procedure Laterality Date     BENCH LIVER N/A 3/4/2017    Procedure: BENCH LIVER;  Surgeon: Jovan Tran MD;  Location: [facility] OR     C NONSPECIFIC PROCEDURE      tracheostomy     C NONSPECIFIC PROCEDURE      repair of deviated septum     C NONSPECIFIC PROCEDURE  2007    Rt knee arthroscopy     C TOTAL KNEE ARTHROPLASTY  2008    Right knee arthroscopy     CHOLECYSTECTOMY        ESOPHAGOSCOPY, GASTROSCOPY, DUODENOSCOPY (EGD), COMBINED N/A 2016    Procedure: COMBINED ESOPHAGOSCOPY, GASTROSCOPY, DUODENOSCOPY (EGD), BIOPSY SINGLE OR MULTIPLE;  Surgeon: Trent Pederson MD;  Location:  GI     TRANSPLANT LIVER RECIPIENT  DONOR N/A 3/4/2017    Procedure: TRANSPLANT LIVER RECIPIENT  DONOR;  Surgeon: Jovan Tran MD;  Location:  OR     FAMILY HISTORY:  family history includes Arthritis in his mother; CANCER in his mother; DIABETES in his father; Hypertension in his father; Other Cancer in his mother; Substance Abuse in his father; Thyroid Disease in his mother. There is no history of Colon Cancer, Hyperlipidemia, Coronary Artery Disease, CEREBROVASCULAR DISEASE, Breast Cancer, or Prostate Cancer.    SOCIAL HISTORY:  - past tobacco use, quit 20 months prior  - denies alcohol use  - denies illicit drug use    ALLERGIES:  Allergies   Allergen Reactions     Erythromycin GI Disturbance     Vioxx      Nausea, vomiting       MEDICATIONS:  Prescriptions Prior to Admission   Medication Sig Dispense Refill Last Dose     gabapentin (NEURONTIN) 300 MG capsule Take 1 capsule (300 mg) by mouth 3 times daily 270 capsule 3      amLODIPine (NORVASC) 5 MG tablet Take 1 tablet (5 mg) by mouth daily 90 tablet 3      tacrolimus (PROGRAF - GENERIC EQUIVALENT) 1 MG capsule Take 4capsules (4mg) in the morning and 3 capsules (3 mg) in the evening. Take every 12 hours. 210 capsule 11      mycophenolate (CELLCEPT - GENERIC EQUIVALENT) 250 MG capsule Take 1 capsule (250 mg) by mouth every 12 hours 180 capsule 3      sulfamethoxazole-trimethoprim (BACTRIM/SEPTRA) 400-80 MG per tablet Take 1 tablet on Monday and take 1 tablet on Thursday 8 tablet 3      fludrocortisone (FLORINEF) 0.1 MG tablet Take 1 tablet (0.1 mg) by mouth daily For elevated potassium 30 tablet 3      oxyCODONE (ROXICODONE) 10 MG IR tablet Take 1 tablet (10 mg) by mouth daily as needed for moderate to severe pain 30 tablet 0       clotrimazole (MYCELEX) 10 MG LOZG lozenge Place 1 lozenge (1 Beatrice) inside cheek 4 times daily 120 each 1 Taking     Linagliptin (TRADJENTA PO) Take 5 mg by mouth   Not Taking     tadalafil (CIALIS) 5 MG tablet Take 1 tablet (5 mg) by mouth daily Never use with nitroglycerin, terazosin or doxazosin. 30 tablet 11 Taking     tadalafil (CIALIS) 5 MG tablet Take 1 tablet (5 mg) by mouth daily Never use with nitroglycerin, terazosin or doxazosin. 30 tablet 1 Taking     cyclobenzaprine (FLEXERIL) 10 MG tablet Take 1 tablet (10 mg) by mouth 3 times daily as needed for muscle spasms 90 tablet 0 Taking     omeprazole (PRILOSEC) 20 MG CR capsule Take 1 capsule (20 mg) by mouth 2 times daily (before meals) 60 capsule 11 Taking     magnesium oxide (MAG-OX) 400 MG tablet Take 1 tablet (400 mg) by mouth 2 times daily 7 tablet 1 Taking     lidocaine (LIDODERM) 5 % Patch Place 1 patch onto the skin every 24 hours (Patient not taking: Reported on 6/7/2017) 30 patch 1 Not Taking     LACTOBACILLUS EXTRA STRENGTH CAPS Take 1 capsule by mouth 2 times daily 30 capsule 1 Taking     prochlorperazine (COMPAZINE) 5 MG tablet Take 1-2 tablets (5-10 mg) by mouth every 6 hours as needed for nausea or vomiting (Patient not taking: Reported on 6/7/2017) 90 tablet 0 Not Taking     oxyCODONE (ROXICODONE) 5 MG IR tablet Take 1-2 tablets (5-10 mg) by mouth every 4 hours as needed for moderate to severe pain 40 tablet 0 Taking     aspirin 325 MG tablet 1 tablet (325 mg) by Oral or Feeding Tube route daily 180 tablet 4 Taking     insulin aspart (NOVOLOG PEN) 100 UNIT/ML injection DOSE:  1 units per 8 grams of carbohydrate. With meals and snacks. Only chart total amount of units given.  Do not give if pre-prandial glucose is less than 60 mg/dL. 3 mL 3 Taking     insulin glargine (LANTUS) 100 UNIT/ML injection Inject 45 Units Subcutaneous every 24 hours (Patient taking differently: Inject 50 Units Subcutaneous every 24 hours ) 3 mL 3 Taking      valGANciclovir (VALCYTE) 450 MG tablet Take 1 tablet (450 mg) by mouth daily 60 tablet 5 Taking     multivitamin, therapeutic with minerals (THERA-VIT-M) TABS tablet Take 1 tablet by mouth daily 30 each 11 Taking     ondansetron (ZOFRAN-ODT) 4 MG ODT tab Take 1 tablet (4 mg) by mouth every 8 hours as needed for nausea 30 tablet 0 Taking     glucagon 1 MG SOLR injection Inject 1 mg Subcutaneous every 15 minutes as needed for low blood sugar (May repeat x 1 only) 1 each 0 Taking          PHYSICAL EXAMINATION:  Temp:  [98.5  F (36.9  C)-99.7  F (37.6  C)] 98.5  F (36.9  C)  Heart Rate:  [100-133] 106  Resp:  [26-42] 38  BP: ()/(25-79) 94/72  SpO2:  [86 %-100 %] 97 %    Physical Exam  - Gen:  Intubated and sedated, does not appear septic, normal appearing skin  - HEENT: ETT present, pupils normal size, reactive to light  - CVS: NR/RR,   no murmurs noted.  - Pulm: CTAB, mechanically ventilated  - Abd: soft, non-tender, non-distended, no guarding, vaccuum dressing in place  - MSK/Neuro: sedated  - Ext: warm, well-perfused;  - Incision: C/D/I without surrounding erythema, drainage, or fluctuance    LABS  CMP  Recent Labs  Lab 07/04/17  1200 07/04/17  0735    146*   POTASSIUM 4.5 3.9   CHLORIDE  --  121*   CO2  --  13*   ANIONGAP  --  12   * 187*   BUN  --  30   CR  --  1.56*   GFRESTIMATED  --  47*   GFRESTBLACK  --  57*   JACOB  --  5.6*   MAG  --  1.4*   PHOS  --  1.7*   PROTTOTAL  --  3.8*   ALBUMIN  --  1.9*   BILITOTAL  --  0.3   ALKPHOS  --  44   AST  --  17   ALT  --  12       CBC  Recent Labs  Lab 07/04/17  1200 07/04/17  0735   WBC  --  1.1*   HGB 8.7* 9.6*   PLT  --  95*   HCT  --  28.1*   MCV  --  87   RBC  --  3.25*   MCH  --  29.5   MCHC  --  34.2   RDW  --  14.8       INR   Recent Labs  Lab 07/04/17  0735   INR 1.87*       ASSESSMENT: Camacho Bhagat is a 51 year old male POD #0, s/p exploratory laparotomy for neutropenic colitis requiring SICU admission for possible sepsis supportive cares.  PMH significant for liver transplant in March of 2017, GERD, fibromyalgia, previous DVT with IVC filter, type II DM, HTN, RONNIE requiring BiPAP. Plan for patient to return to OR for another exploratory laparotomy on 7/5.      PLAN:    Neuro/ pain/ sedation:  - Monitor neurological status. Notify the MD for any acute changes in exam.  - Pain Meds: fentanyl infusion  mcg/hr  - propofol sedation infusion 5-75 mcg/kg/hr  - versed boluses if needed   [Dilaudid IV PRN; APAP scheduled]    CV:   - Monitor hemodynamic status.  - Continuous cardiac monitoring.   - Pressors if needed, MAP>65, levophed as needed, contact transplant if increase  - If boluses are not effective   - EKG    Pulm:   - Supplemental oxygen to keep saturation above 92 %.  - Chest XR for tube confirmation and baseline    FEN/ GI:   - NPO except medications  - mIVf: NS  @ 150cc/hr  - ICU electrolyte replacement protocol  - Bowel Movements:   - Liver US    :  - UOP is adequate  - Crowe catheter to stay in place for strict intake and output monitoring    Endocrine:   - SSI  - insulin gtt    ID/ Abx:  - Vancomycin, zosyn, flagyl, and micafungin per transplant    Heme: Hemoglobin stable. Will recheck in AM or earlier if there is clinical evidence of active bleeding.  - Q8H CMP, coags, BMP, calcium  - Q4H lactate  - GCSF per transplant    Prophylaxis:    - DVT: SCDs, Lovenox [Heparin]  - GI: Ranitidine IV    MSK:  - PT/OT held until surgery complete    Lines/Drains:  - ETT, IJ TLCVC, Right radial a-line, NG tube, Crowe    Disposition: Surgical ICU    Patient seen, findings and plan discussed with surgical ICU staff, Dr. Albert.    Johnson Hobbs MD  Anesthesia CA2  Pager: [phone]

## 2017-07-04 NOTE — ANESTHESIA PREPROCEDURE EVALUATION
Anesthesia Evaluation     . Pt has had prior anesthetic. Type: General (s/p liver transplant)           ROS/MED HX    ENT/Pulmonary:     (+)sleep apnea, , . .    Neurologic:       Cardiovascular:     (+) hypertension----. : . . . :. .       METS/Exercise Tolerance:     Hematologic:         Musculoskeletal:         GI/Hepatic: Comment: S/p liver transplant    (+) GERD liver disease,       Renal/Genitourinary:     (+) chronic renal disease,       Endo:         Psychiatric:         Infectious Disease:   (+) VRE,       Malignancy:         Other:                     Physical Exam  Normal systems: pulmonary    Airway   Comment: Unable to assess. Patient is not following commands    Dental   Comment: Unable to assess     Cardiovascular   Rhythm and rate: regular      Pulmonary    breath sounds clear to auscultation                    Anesthesia Plan      History & Physical Review  History and physical reviewed and following examination; no interval change.    ASA Status:  3 emergent.    NPO Status:  Unknown    Plan for General, RSI and ETT with Intravenous induction. Maintenance will be Inhalation.    PONV prophylaxis:  Dexamethasone or Solumedrol and Ondansetron (or other 5HT-3)  Additional equipment: 2nd IV, Arterial Line and Central Line      Postoperative Care  Postoperative pain management:  Multi-modal analgesia.      Consents        Anesthesia Pre-op Note    Camacho Bhagat is a 51 year old male with past medical as described below is scheduled for Procedure(s):  Exploratory Laparotomy, washout - Wound Class: II-Clean Contaminated    Past Medical History:   Diagnosis Date     Cancer (H)      Depressive disorder, not elsewhere classified      Esophageal reflux      Fibromyalgia 1/2009    dx with Dr Benitez( Rheum)     History of thrombophlebitis      Osteoarthritis      Other acute embolism veins 11/01    Deep vein thrombophlebitis, filter placed     Other and unspecified hyperlipidemia      Other chronic nonalcoholic  liver disease     Fatty liver      Other testicular hypofunction      Pneumonia, organism 10-01    Included ARDS, sepsis, and  acute renal failure; hospitalized     Rheumatoid arthritis(714.0)      Type II or unspecified type diabetes mellitus without mention of complication, not stated as uncontrolled     Managed by endocrinology     Unspecified essential hypertension     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     Unspecified sleep apnea     Uses BiPAP     Past Surgical History:   Procedure Laterality Date     BENCH LIVER N/A 3/4/2017    Procedure: BENCH LIVER;  Surgeon: Jovan Tran MD;  Location: [facility] OR     C NONSPECIFIC PROCEDURE      tracheostomy     C NONSPECIFIC PROCEDURE      repair of deviated septum     C NONSPECIFIC PROCEDURE      Rt knee arthroscopy     C TOTAL KNEE ARTHROPLASTY      Right knee arthroscopy     CHOLECYSTECTOMY       ESOPHAGOSCOPY, GASTROSCOPY, DUODENOSCOPY (EGD), COMBINED N/A 2016    Procedure: COMBINED ESOPHAGOSCOPY, GASTROSCOPY, DUODENOSCOPY (EGD), BIOPSY SINGLE OR MULTIPLE;  Surgeon: Trent Pederson MD;  Location:  GI     TRANSPLANT LIVER RECIPIENT  DONOR N/A 3/4/2017    Procedure: TRANSPLANT LIVER RECIPIENT  DONOR;  Surgeon: Jovan Tran MD;  Location: [facility] OR     Family History   Problem Relation Age of Onset     DIABETES Father      Hypertension Father      Substance Abuse Father      Arthritis Mother      CANCER Mother      Thyroid     Thyroid Disease Mother      Other Cancer Mother      Colon Cancer No family hx of      Hyperlipidemia No family hx of      Coronary Artery Disease No family hx of      CEREBROVASCULAR DISEASE No family hx of      Breast Cancer No family hx of      Prostate Cancer No family hx of      Social History     Social History     Marital status:      Spouse name: N/A     Number of children: 1     Years of education: N/A     Occupational History            Social History  Main Topics     Smoking status: Former Smoker     Smokeless tobacco: Former User     Types: Chew     Quit date: 10/8/2015      Comment: Has used chewing tobacco since age 15 , chewed for 20yrs      Alcohol use No      Comment: last drink about a year ago (quit 2001)     Drug use: No     Sexual activity: Yes     Partners: Female     Other Topics Concern     Not on file     Social History Narrative    uSED TO BE      Back in school now         No current outpatient prescriptions on file.     Current Facility-Administered Medications   Medication     fludrocortisone (FLORINEF) tablet 0.1 mg     tacrolimus (PROGRAF - GENERIC EQUIVALENT) capsule 4 mg     valGANciclovir (VALCYTE) tablet 450 mg     glucose 40 % gel 15-30 g    Or     dextrose 50 % injection 25-50 mL    Or     glucagon injection 1 mg     insulin aspart (NovoLOG) inj (RAPID ACTING)     prochlorperazine (COMPAZINE) Suppository 25 mg     tacrolimus (PROGRAF - GENERIC EQUIVALENT) capsule 3 mg     vancomycin (VANCOCIN) 1,750 mg in NaCl 0.9 % 500 mL intermittent infusion     HYDROmorphone (DILAUDID) injection 0.2 mg     micafungin (MYCAMINE) 100 mg in NaCl 0.9 % 100 mL intermittent infusion     lidocaine 1 % 1 mL     lidocaine (LMX4) kit     sodium chloride (PF) 0.9% PF flush 3 mL     sodium chloride (PF) 0.9% PF flush 3 mL     ranitidine (ZANTAC) injection 50 mg     ondansetron (ZOFRAN-ODT) ODT tab 4 mg    Or     ondansetron (ZOFRAN) injection 4 mg     prochlorperazine (COMPAZINE) injection 5-10 mg    Or     prochlorperazine (COMPAZINE) tablet 5-10 mg     piperacillin-tazobactam (ZOSYN) 3.375 g vial to attach to  mL bag     metroNIDAZOLE (FLAGYL) infusion 500 mg     filgrastim (NEUPOGEN) 480 mcg in D5W 25 mL infusion     bisacodyl (DULCOLAX) Suppository 10 mg     naloxone (NARCAN) injection 0.1-0.4 mg     fentaNYL (SUBLIMAZE) infusion     propofol (DIPRIVAN) infusion    And     propofol (DIPRIVAN) injection 10 mg/mL vial     dextrose  5% and 0.45% NaCl + KCl 20 mEq/L 20-5-0.45 MEQ/L-%-% infusion     HYDROmorphone (PF) (DILAUDID) injection 0.3-0.5 mg     acetaminophen (TYLENOL) tablet 650 mg    Or     acetaminophen (TYLENOL) Suppository 650 mg     norepinephrine (LEVOPHED) 16 mg in D5W 250 mL infusion     0.9% sodium chloride infusion        Allergies   Allergen Reactions     Erythromycin GI Disturbance     Vioxx      Nausea, vomiting         Lab:     CBC RESULTS:   Recent Labs   Lab Test  07/04/17 1200 07/04/17   0735   WBC   --   1.1*   RBC   --   3.25*   HGB  8.7*  9.6*   HCT   --   28.1*   MCV   --   87   MCH   --   29.5   MCHC   --   34.2   RDW   --   14.8   PLT   --   95*     Recent Labs   Lab Test  07/04/17 1200 07/04/17   0735  07/03/17   1033   NA  137  146*  136   POTASSIUM  4.5  3.9  5.4*   CHLORIDE   --   121*  108   CO2   --   13*  23   ANIONGAP   --   12  6   GLC  154*  187*  212*   BUN   --   30  32*   CR   --   1.56*  1.12   JACOB   --   5.6*  8.3*     The laboratory has evaluated your INR and PTT and the results are as listed below.    Lab Results   Component Value Date    INR 1.87 07/04/2017     Lab Results   Component Value Date    PTT 27 03/06/2017

## 2017-07-04 NOTE — CODE/RAPID RESPONSE
Rapid respond was called for lactic acid 8.7  . New order of LR bolus issued . Plan patient will go for surgery now .

## 2017-07-04 NOTE — PLAN OF CARE
Problem: Restraint for Non-Violent/Non-Self-Destructive Behavior  Goal: Prevent/Manage Potential Problems  Maintain safety of patient and others during period of restraint.  Promote psychological and physical wellbeing.  Prevent injury to skin and involved body parts.  Promote nutrition, hydration, and elimination.   Outcome: No Change  Restraints applied d/t risk of pulling out ET tube and pulling at IV's.

## 2017-07-04 NOTE — ADDENDUM NOTE
Addendum  created 07/04/17 1730 by Connor Colon MD    Anesthesia Attestations filed, Anesthesia Intra Blocks edited, Child order released for a procedure order, Sign clinical note

## 2017-07-04 NOTE — PROVIDER NOTIFICATION
07/04/17 0800 07/04/17 0943   Call Information   Date of Call 07/04/17 07/04/17   Time of Call 0615 0943   Name of person requesting the team Burton Holt   Title of person requesting team RN RN   RRT Arrival time 0617 0900   Time RRT ended --  1108   Reason for call   Type of RRT Adult Adult   Primary reason for call Sepsis suspected;Nurse is concerned/worried Sepsis suspected   Sepsis Suspected Elevated Lactate level;Heart Rate > 100;RR > 20, SaO2 <90% OR increasing O2 need;Pain presence and interventions Elevated Lactate level   Was patient transferred from the ED, ICU, or PACU within last 24 hours prior to RRT call? No  (transfer from outside hospital) No  (recent transfer from [facility]SH)   SBAR   Situation Pt lactic acid = 4.0  Lactic Acid 8.7 after 2L bolus   Background liver tx March 2017, now with severe, worsening abd pain x 2 days liver tx March 2017, now with severe, worsening abd pain x 2 days   Notable History/Conditions Recent surgery;Transplant;Diabetes Recent surgery;Transplant;Diabetes   Assessment Pt is writhining in pain, abdomen soft, trying to have freq BM's without results; initially alert and oriented but occasional nonsensical speech; tachycardic, hypotensive at times Pt hypotensive with Fluid bolus running. Heart rate tachycardic. O2 sats in upper 80's, shallow breathing due to pain. Continues to complain of severe abdominal pain.   Interventions Fluid bolus;IV fluids;Meds;ECG;Blood glucose;Other (describe)  (abd xray, antbx) Fluid bolus;IV fluids;Meds;O2 per N/C or mask;Portable monitor  (IV antibiotics started. Additional IV fluid boluses started)   Adjustments to Recommend imaging, bolus for hypotension, natbx, consider NG tube placement, transfer to ICU for closer monitoring/igh nursing care needs. Transfer to ICU. Started on 3L oxyplus mask.    Patient Outcome   Patient Outcome --  Stabilized on unit;Transferred to  (Pt transfered to OR for Ex Lap)   RRT Team   Attending/Primary/Covering  Physician N Kaylin --    Date Attending Physician notified 06/15/17 07/04/17   Time Attending Physician notified [phone]   Physician(s) Olesya Cardoza RN Gabby Manrique/Yanet Restrepo

## 2017-07-04 NOTE — ANESTHESIA CARE TRANSFER NOTE
Patient: Camacho Bhagat    Procedure(s):  Exploratory Laparotomy, washout - Wound Class: II-Clean Contaminated    Diagnosis: Possible Acute Abdominal Gangrenous Bowel  Diagnosis Additional Information: No value filed.    Anesthesia Type:   No value filed.     Note:  Airway :ETT  Patient transferred to:ICU  Comments: Patient transferred to ICU 4A on monitors and ambu.  Patient remains on pressors; Vasopressin; Norepi and Propofol for sedation.  Upon arrival to ICU patient switched over to ventilator.  Report given to RN at bedside.        Vitals: (Last set prior to Anesthesia Care Transfer)    CRNA VITALS  7/4/2017 1250 - 7/4/2017 1320      7/4/2017             Resp Rate (set): 10                Electronically Signed By: LAMIN Hanley CRNA  July 4, 2017  1:20 PM

## 2017-07-04 NOTE — PROGRESS NOTES
CLINICAL NUTRITION SERVICES - ASSESSMENT NOTE     Nutrition Prescription    RECOMMENDATIONS FOR MDs/PROVIDERS TO ORDER:  1. Monitor Lytes with start and advancement of TPN , replace per protocol.   2. Omit any IV dextrose from MIVF with start of TPN.  Please note patient is diabetic and is on insulin, monitor BG levels for need to add insulin in PN bag.     Malnutrition Status:    Non-severe malnutrition in the context of acute illness    Recommendations already ordered by Registered Dietitian (RD):  - Sent the following TPN change order to the unit pharmacy:  1. Rec begin PN/lipids with following formulation based on 94 kg dosing weight:    - PN @ 65 mL/hr (1560 mL total) with 235 grams dextrose,  145 grams AA and 250 mL lipids daily. ( Pharmacy please Hold daily lipids while on propofol, restart once propofol discontinued per protocol ).   - Risk for refeeding: please start with 150 gm dextrose /day ( GIR: 1.1 mg/kg/min), If lytes within acceptable range ( Mg++/K+ normal and phos > 1.9), continue to advance dextrose by 28 gms daily to final dextrose goal of 235 gm /day.   - Goal TPN Provides: 1879 kcals/day (20 kcal/kg/day), 1.5 gm PRO/kg/day, 235 gm CHO/day, GIR: 1.7 mg/kg/min and 26 % kcals from Fat.    2. Monitor Propofol levels, need to start IV lipids  3. Order add on TG prior to starting IV lipids for baseline TG.   4. Repeat LFTs/TG every Monday to assess for TPN tolerance     Future/Additional Recommendations:  None        REASON FOR ASSESSMENT  Camacho TERESA Bhagat is a/an 51 year old male assessed by the dietitian for Provider consult:  Pharmacy/Nutrition to Start and Manage PN    Chart reviewed: [facility] Liver transplant on 3/4/2017, DM2  Admitted with severe abdominal pain, N/V. (Worsening abd pain x 2 days). Transferred to OR for EX.Lap      NUTRITION HISTORY  Admitted from OR / intubated/sedated       CURRENT NUTRITION ORDERS  Diet: NPO      LABS  K+: 4.5, Mg++: 1.4 ( L), Phos: 1.7 ( L)  B  BUN: 30  Cr:  "1.56 ( H)  Alk phos: 44 - normal, ALT/AST: normal)  TG: not available       MEDICATIONS  Immune suppression medication ( prograf)  Insulin   Flagyl, Vanco   Propofol       ANTHROPOMETRICS  Height: 182.9 cm (6' 0\")  Most Recent Weight: 94.2 kg (207 lb 11.2 oz) (SCALE 2) - Admit wt on 7/4/16  IBW: 80.9 kg ( 116% IBW)   BMI: 28.17 kg /m2 - Overweight BMI 25-29.9  Weight History: wt loss 3.8 kg in 1 month ( ~ 4% net loss in 1 month ) vs. 10.9 kg wt loss in 6 month ( 10% net wt loss )   Wt Readings from Last 10 Encounters:   07/04/17 94.2 kg (207 lb 11.2 oz)   06/07/17 98 kg (216 lb)   05/11/17 101.7 kg (224 lb 4.8 oz)   05/04/17 105.7 kg (233 lb)   04/24/17 106.1 kg (233 lb 14.4 oz)   04/18/17 111.6 kg (246 lb)   04/17/17 111.6 kg (246 lb)   03/26/17 107.2 kg (236 lb 5.3 oz)   03/16/17 103.3 kg (227 lb 12.8 oz)   03/13/17 105.1 kg (231 lb 9.6 oz)       Dosing Weight: 94 kg dry admit wt     ASSESSED NUTRITION NEEDS  Estimated Energy Needs:1880 - 2350  kcals/day (20 - 25 kcals/kg)  Justification: modest needs for maintenance under TPN support with recent history of liver transplant.   Estimated Protein Needs: 113 - 141 grams protein/day (1.2 - 1.5 grams of pro/kg)  Justification: Post-op  Estimated Fluid Needs:(1 mL/kcal) Maintenance or Per provider pending fluid status      PHYSICAL FINDINGS  See malnutrition section below.      MALNUTRITION  % Intake: Likely ~ 2-3 days low po intake , unable to assess   % Weight Loss: Up to 5% in 1 month (non-severe)  Subcutaneous Fat Loss: Unable to assess  Muscle Loss: Unable to assess  Fluid Accumulation/Edema: None noted  Malnutrition Diagnosis: Non-severe malnutrition in the context of acute illness      NUTRITION DIAGNOSIS  Inadequate oral intake related to post op GI status as evidenced by NPO status, plan to start TPN support tonight     INTERVENTIONS  Implementation  Nutrition Education: Not appropriate at this time due to patient condition   Parenteral Nutrition/IV Fluids - " Sent pharmacy change order      Goals  Total avg nutritional intake to meet a minimum of 20 kcal/kg and 1.2 gm PRO/kg daily (per dosing wt 94 kg admit wt ).     Monitoring/Evaluation  Progress toward goals will be monitored and evaluated per protocol.    Thierry Rhodes RD/REFUGIO  Pager [phone]

## 2017-07-04 NOTE — PROGRESS NOTES
Transferred to OR via bed with anesthesia team . Mom was updated about the plan . Chart ,medication sent with patient . Patient's belonging at the nursing station at  , will send them when confirm location post op .

## 2017-07-04 NOTE — PLAN OF CARE
Problem: Goal Outcome Summary  Goal: Goal Outcome Summary  Outcome: No Change  N: Propofol gtt for sedation, Fentanyl gtt for pain. Moves all extremities independently.  R: Maintaining sats on CMV vent settings: peep 5, 50% O2. ETT advanced 3cm per SICU MD verbal order, chest xray ordered to verify placement.  CV: Sinus rhythm in 90's. Levophed titrated to keep MAP>65. CVP 8. 1 L LR bolus given for low BP/urine output. Albumin bolus given x 1. IVMF at 150/hr.   GI/: No BM. Crowe in place - low output (MD notified, bolus ordered). Electrolytes monitored and replaced per protocol. NG to LIS - OK to clamp for meds per MD verbal. Abthera in place - MD notified of high output.      Plan: OR 7/5 for repeat ex lap.

## 2017-07-04 NOTE — H&P
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 The pain started in Friday and has progressively gotten worse. Started in the umbilical region and then radiated to left and right iliac regions. No ass symptoms of nausea vomiting, fever, or diarrhea. The pain is darting in nature and is continuous but has episodes of increase in intensity. He went to the ED at [facility] and was found to have colitis on CT scan.  Last bowel movement yesterday, havent passed flatus since yesterday    He was transferred here for further management    Review of systems:   A 10 point review of systems was performed and was negative except for the items in the HPI.     PAST MEDICAL HISTORY:  Past Medical History:   Diagnosis Date     Cancer (H)      Depressive disorder, not elsewhere classified      Esophageal reflux      Fibromyalgia 1/2009    dx with Dr Benitez( Rheum)     History of thrombophlebitis      Osteoarthritis      Other acute embolism veins 11/01    Deep vein thrombophlebitis, filter placed     Other and unspecified hyperlipidemia      Other chronic nonalcoholic liver disease     Fatty liver      Other testicular hypofunction      Pneumonia, organism 10-01    Included ARDS, sepsis, and  acute renal failure; hospitalized     Rheumatoid arthritis(714.0)      Type II or unspecified type diabetes mellitus without mention of complication, not stated as uncontrolled 11/01    Managed by endocrinology     Unspecified essential hypertension 11/01    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     Unspecified sleep apnea     Uses BiPAP       PAST SURGICAL HISTORY:  Past Surgical History:   Procedure Laterality Date     BENCH LIVER N/A 3/4/2017    Procedure: BENCH LIVER;  Surgeon: Jovan Tran MD;  Location: [facility] OR      NONSPECIFIC PROCEDURE       tracheostomy     C NONSPECIFIC PROCEDURE      repair of deviated septum     C NONSPECIFIC PROCEDURE      Rt knee arthroscopy     C TOTAL KNEE ARTHROPLASTY  2008    Right knee arthroscopy     CHOLECYSTECTOMY       ESOPHAGOSCOPY, GASTROSCOPY, DUODENOSCOPY (EGD), COMBINED N/A 2016    Procedure: COMBINED ESOPHAGOSCOPY, GASTROSCOPY, DUODENOSCOPY (EGD), BIOPSY SINGLE OR MULTIPLE;  Surgeon: Trent Pederson MD;  Location:  GI     TRANSPLANT LIVER RECIPIENT  DONOR N/A 3/4/2017    Procedure: TRANSPLANT LIVER RECIPIENT  DONOR;  Surgeon: Jovan Tran MD;  Location:  OR       FAMILY HISTORY:  family history includes Arthritis in his mother; CANCER in his mother; DIABETES in his father; Hypertension in his father; Other Cancer in his mother; Substance Abuse in his father; Thyroid Disease in his mother. There is no history of Colon Cancer, Hyperlipidemia, Coronary Artery Disease, CEREBROVASCULAR DISEASE, Breast Cancer, or Prostate Cancer.      SOCIAL HISTORY:   reports that he has quit smoking. He quit smokeless tobacco use about 20 months ago. His smokeless tobacco use included Chew. He reports that he does not drink alcohol or use illicit drugs.    ALLERGIES:  Allergies   Allergen Reactions     Erythromycin GI Disturbance     Vioxx      Nausea, vomiting       MEDICATIONS:  Prescriptions Prior to Admission   Medication Sig Dispense Refill Last Dose     gabapentin (NEURONTIN) 300 MG capsule Take 1 capsule (300 mg) by mouth 3 times daily 270 capsule 3      amLODIPine (NORVASC) 5 MG tablet Take 1 tablet (5 mg) by mouth daily 90 tablet 3      tacrolimus (PROGRAF - GENERIC EQUIVALENT) 1 MG capsule Take 4capsules (4mg) in the morning and 3 capsules (3 mg) in the evening. Take every 12 hours. 210 capsule 11      mycophenolate (CELLCEPT - GENERIC EQUIVALENT) 250 MG capsule Take 1 capsule (250 mg) by mouth every 12 hours 180 capsule 3      sulfamethoxazole-trimethoprim (BACTRIM/SEPTRA) 400-80 MG  per tablet Take 1 tablet on Monday and take 1 tablet on Thursday 8 tablet 3      fludrocortisone (FLORINEF) 0.1 MG tablet Take 1 tablet (0.1 mg) by mouth daily For elevated potassium 30 tablet 3      oxyCODONE (ROXICODONE) 10 MG IR tablet Take 1 tablet (10 mg) by mouth daily as needed for moderate to severe pain 30 tablet 0      clotrimazole (MYCELEX) 10 MG LOZG lozenge Place 1 lozenge (1 Beatrice) inside cheek 4 times daily 120 each 1 Taking     Linagliptin (TRADJENTA PO) Take 5 mg by mouth   Not Taking     tadalafil (CIALIS) 5 MG tablet Take 1 tablet (5 mg) by mouth daily Never use with nitroglycerin, terazosin or doxazosin. 30 tablet 11 Taking     tadalafil (CIALIS) 5 MG tablet Take 1 tablet (5 mg) by mouth daily Never use with nitroglycerin, terazosin or doxazosin. 30 tablet 1 Taking     cyclobenzaprine (FLEXERIL) 10 MG tablet Take 1 tablet (10 mg) by mouth 3 times daily as needed for muscle spasms 90 tablet 0 Taking     omeprazole (PRILOSEC) 20 MG CR capsule Take 1 capsule (20 mg) by mouth 2 times daily (before meals) 60 capsule 11 Taking     magnesium oxide (MAG-OX) 400 MG tablet Take 1 tablet (400 mg) by mouth 2 times daily 7 tablet 1 Taking     lidocaine (LIDODERM) 5 % Patch Place 1 patch onto the skin every 24 hours (Patient not taking: Reported on 6/7/2017) 30 patch 1 Not Taking     LACTOBACILLUS EXTRA STRENGTH CAPS Take 1 capsule by mouth 2 times daily 30 capsule 1 Taking     prochlorperazine (COMPAZINE) 5 MG tablet Take 1-2 tablets (5-10 mg) by mouth every 6 hours as needed for nausea or vomiting (Patient not taking: Reported on 6/7/2017) 90 tablet 0 Not Taking     oxyCODONE (ROXICODONE) 5 MG IR tablet Take 1-2 tablets (5-10 mg) by mouth every 4 hours as needed for moderate to severe pain 40 tablet 0 Taking     aspirin 325 MG tablet 1 tablet (325 mg) by Oral or Feeding Tube route daily 180 tablet 4 Taking     insulin aspart (NOVOLOG PEN) 100 UNIT/ML injection DOSE:  1 units per 8 grams of carbohydrate.  With meals and snacks. Only chart total amount of units given.  Do not give if pre-prandial glucose is less than 60 mg/dL. 3 mL 3 Taking     insulin glargine (LANTUS) 100 UNIT/ML injection Inject 45 Units Subcutaneous every 24 hours (Patient taking differently: Inject 50 Units Subcutaneous every 24 hours ) 3 mL 3 Taking     valGANciclovir (VALCYTE) 450 MG tablet Take 1 tablet (450 mg) by mouth daily 60 tablet 5 Taking     multivitamin, therapeutic with minerals (THERA-VIT-M) TABS tablet Take 1 tablet by mouth daily 30 each 11 Taking     ondansetron (ZOFRAN-ODT) 4 MG ODT tab Take 1 tablet (4 mg) by mouth every 8 hours as needed for nausea 30 tablet 0 Taking     glucagon 1 MG SOLR injection Inject 1 mg Subcutaneous every 15 minutes as needed for low blood sugar (May repeat x 1 only) 1 each 0 Taking       PHYSICAL EXAMINATION:  Temp:  [99.7  F (37.6  C)] 99.7  F (37.6  C)  Resp:  [42] 42    General appearance: communicative, in distress due to pain  Neuro: No gross deficits noted, moving extremities spontaneously.  CV: Hemodynamically stable  Pulm: Non-labored breathing  Abd: soft; non-distended, generalized tenderness, no rebound,   Extremities: no edema  Skin: warm and well-perfused.     LABS:  TW: 0.5, Hb: 9.6, Hct: 29.0, Plt: 73  PT: 14.6  LA: 4.0  TB: 0.6, Alp Phos: 86, ALT: 18, AST: 28  Na; 137, K: 5.4, Ca: 9.0, BUN: 32, Cr: 1.32    Imaging:  CT scan:   CONCLUSION:   1. Right colonic wall thickening suggesting colitis. Follow-up is necessary to confirm resolution in order to exclude colonic mass.  2. Transverse colon is not well distended reducing evaluation for wall thickening.  3. Small amount of ascites.  4. Splenomegaly.  5. Prominent portal and splenic veins. If confirmation of vascular patency is indicated consider follow-up ultrasound of the liver with Doppler.  6. Small right pleural effusion.  7. Stranding in the subcutaneous fat of the ventral pelvis.     ASSESSMENT/PLAN: Camacho Bhagat is a 51 year old  male with previous history of liver transplant who has presented with sepsis secondary to colitis, ? Intestinal perforation secondary to colitis    - npo  - IV zosyn/ vanc  - send labs: CBC, BMP, Ca, Mg, LFTs, amylase lipase tacrolimus levels  - C diff  - IVF: 100 ml/h  - AXR stat  - restart immunosuppressants  - IV dilaudid 0.2 mg 2H PRN   - Sliding scale  - IV bolus  - Blood cultures    Pt discussed with transplant fellow Dr Olesya Pedersen MD  General Surgery, PGY-1,   Pager: [phone]

## 2017-07-04 NOTE — ANESTHESIA POSTPROCEDURE EVALUATION
Patient: Camacho Bhagat    Procedure(s):  Exploratory Laparotomy, washout - Wound Class: II-Clean Contaminated    Diagnosis:Possible Acute Abdominal Gangrenous Bowel  Diagnosis Additional Information: No value filed.    Anesthesia Type:  General, RSI, ETT    Note:  Anesthesia Post Evaluation    Last vitals:  Vitals:    07/04/17 1500 07/04/17 1600 07/04/17 1700   BP:      Resp:      Temp:  37  C (98.6  F)    SpO2: 97% 100% 99%         Electronically Signed By: Connor Colon MD  July 4, 2017  5:26 PM

## 2017-07-04 NOTE — LETTER
Transition Communication Hand-off for Care Transitions to Next Level of Care Provider    Name: Camacho Bhagat  MRN #: [id number]  Primary Care Provider: Shay Kirkpatrick     Primary Clinic: [address] [place] [id number]  [facility] [address]     Reason for Hospitalization:  Sepsis (H) [A41.9]  H/O liver transplant (H) [Z94.4]  Admit Date/Time: 7/4/2017  4:45 AM  Discharge Date: 9.8.17  Payor Source: Payor: BCBS / Plan: BCBS OUT OF STATE / Product Type: Indemnity /              Reason for Communication Hand-off Referral: Other right hemicolectomy/sepsis/GIB/new ileostomy/3 abdominal drains    Discharge Plan: To home with his mom in [place], [place]  24/7 caregiver  Wife Danica fields       Concern for non-adherence with plan of care:   Y/N  No  Discharge Needs Assessment:  Needs       Most Recent Value    Equipment Currently Used at Home -- [BP cuff/thermometer and oximeter]    Transportation Available family or friend will provide    Home Care [facility] [phone], Fax: [phone]          Follow-up specialty is recommended: Yes    Follow-up plan:  Future Appointments  Date Time Provider Department Center   9/9/2017 6:00 AM Yoly Rodriguez OT [facility]   9/11/2017 10:15 AM  LAB [facility]   9/11/2017 10:45 AM  LAB [facility]   9/11/2017 11:10 AM Jovan Tran MD [facility]   9/14/2017 11:00 AM  LAB [facility]   9/18/2017 11:00 AM UC LAB [facility]   9/20/2017 2:00 PM UC LAB [facility]   9/20/2017 3:00 PM Tl Higginbotham MD [facility]   9/21/2017 11:15 AM  LAB [facility]   9/22/2017 11:00 AM [id number] UCCXR [facility]   9/25/2017 9:00 AM  LAB [facility]   9/25/2017 9:30 AM Jovan Tran MD [facility]   10/2/2017 11:00 AM  LAB [facility]   10/2/2017 11:30 AM Jovan Tran MD [facility]       Any outstanding tests or procedures:        Referrals     Future Labs/Procedures    Colorectal Surgery Referral     Comments:    Please schedule the  patient in clinic in 6-8 weeks to evaluate him for timing of ileostomy reversal.    Home care nursing referral     Comments:    [facility]  Ph: [phone] #2  Ramu  Fax: [phone]    If you decide you would like a home nurse, please call and inform Ramu    ++++Home visits address++++  [address] [place], [place] [address]  Pt cell: [phone]      RN skilled nursing visit. RN to assess vital signs and weight, respiratory and cardiac status, patients ability to take and record daily blood pressure, temp and weight, pain level and activity tolerance, incision for signs/symptoms of infection, hydration, nutrition and bowel status and home safety.  RN to teach medication management and    oversight with 3 abdominal drains and irrigation  RN to provide tube site care and management and morning lab draws, per MD orders and report results to Outpatient Care Coordinator: lulu torres  Ph: [phone]  Fax: [phone]    Your provider has ordered home care nursing services. If you have not been contacted within 2 days of your discharge please call the inpatient department phone number at [phone] .    ONC/HEME ADULT REFERRAL     Comments:    Your provider has referred you to: [facility]: [facility] [place] [phone]   [email or web address]  [facility]    Please be aware that coverage of these services is subject to the terms and limitations of your health insurance plan.  Call member services at your health plan with any benefit or coverage questions.      Please bring the following with you to your appointment:    (1) Any X-Rays, CTs or MRIs which have been performed.  Contact the facility where they were done to arrange for  prior to your scheduled appointment.   (2) List of current medications  (3) This referral request   (4) Any documents/labs given to you for this referral            Key Recommendations:  Pt and his mom decline HHN visits--I left  referral in just in case they change their mind, [facility]u at [facility] is aware.  [employer] for supplies and they have to deliver to his [place] address--they should be to him by Tuesday 9/12  RTC with Dr Chelsea MORRISON Monday with labs Monday/Thursday--they will come to Op lab at [facility]  IF he developes increased confusion he is to come to the ER to be evaluated  He has a bad decubitus on his buttocks--needs to be watched closely  Danica Shearer    AVS/Discharge Summary is the source of truth; this is a helpful guide for improved communication of patient story

## 2017-07-04 NOTE — PHARMACY-VANCOMYCIN DOSING SERVICE
Pharmacy Vancomycin Initial Note  Date of Service 2017  Patient's  1965  51 year old, male    Indication: Intra-abdominal infection    Current estimated CrCl = Estimated Creatinine Clearance: 91.9 mL/min (based on Cr of 1.12).    Creatinine for last 3 days  7/3/2017: 10:33 AM Creatinine 1.12 mg/dL    Recent Vancomycin Level(s) for last 3 days  No results found for requested labs within last 72 hours.      Vancomycin IV Administrations (past 72 hours)      No vancomycin orders with administrations in past 72 hours.                Nephrotoxins and other renal medications (Future)    Start     Dose/Rate Route Frequency Ordered Stop    17  tacrolimus (PROGRAF - GENERIC EQUIVALENT) capsule 3 mg      3 mg Oral DAILY AT 8PM 17 0655      17 0800  tacrolimus (PROGRAF - GENERIC EQUIVALENT) capsule 4 mg      4 mg Oral EVERY MORNING 17 0646      17 0700  piperacillin-tazobactam (ZOSYN) 3.375 g vial to attach to  mL bag      3.375 g  over 1 Hours Intravenous EVERY 6 HOURS 17 0646            Contrast Orders - past 72 hours     None                Plan:  1.  Start vancomycin  1750 mg IV q12h.   2.  Goal Trough Level: 10-15 mg/L   3.  Pharmacy will check trough levels as appropriate in 1-3 Days.    4. Serum creatinine levels will be ordered every other day for at least 10 days while on concomitant nephrotoxins.    5. [place] method utilized to dose vancomycin therapy: Method 2    Radha Troy, MikelD

## 2017-07-04 NOTE — PLAN OF CARE
Problem: Goal Outcome Summary  Goal: Goal Outcome Summary  Neuro: A&Ox4. Intermittent confusion and inability to concentration d/t severe pain. Restless and hyperactive.   Cardiac: ST, 120-130s. Hypotensive. Pale and clammy. Tmax 99.7. 1L bolus infusing over 1 hr.       Respiratory: Sating >93% on RA.  GI/: No voiding over night. Constant urge to stool with no results. On and off bed pan. Nausea with dry heaving, unable to produce emesis. PRN zofran given x1.  Diet/appetite: NPO.  Activity:  Assist of 2, up to bathroom. Now bedrest d/t hemodynamic instability.    Pain: Severe abdominal pain, worsened with pressure applied to area. 0.2 mg IV dilaudid given x1 with no relief.     Skin: Intact, no new deficits noted.     RRT called at 0600 d/t lactic of 4.0. MD at bedside during shift change. Orders for abdominal xray placed. Awaiting labs and placement of new IV. Handoff given to RN at bedside with float float and circulator present to assist with pt care.      R: Continue with POC. Notify primary team with changes.

## 2017-07-05 ENCOUNTER — APPOINTMENT (OUTPATIENT)
Dept: ULTRASOUND IMAGING | Facility: CLINIC | Age: 52
End: 2017-07-05
Attending: TRANSPLANT SURGERY
Payer: COMMERCIAL

## 2017-07-05 ENCOUNTER — APPOINTMENT (OUTPATIENT)
Dept: GENERAL RADIOLOGY | Facility: CLINIC | Age: 52
End: 2017-07-05
Attending: SURGERY
Payer: COMMERCIAL

## 2017-07-05 ENCOUNTER — ANESTHESIA (OUTPATIENT)
Dept: SURGERY | Facility: CLINIC | Age: 52
End: 2017-07-05
Payer: COMMERCIAL

## 2017-07-05 ENCOUNTER — ANESTHESIA EVENT (OUTPATIENT)
Dept: SURGERY | Facility: CLINIC | Age: 52
End: 2017-07-05
Payer: COMMERCIAL

## 2017-07-05 LAB
ALBUMIN SERPL-MCNC: 2.2 G/DL (ref 3.4–5)
ALBUMIN SERPL-MCNC: 2.4 G/DL (ref 3.4–5)
ALBUMIN SERPL-MCNC: 2.4 G/DL (ref 3.4–5)
ALP SERPL-CCNC: 29 U/L (ref 40–150)
ALP SERPL-CCNC: 37 U/L (ref 40–150)
ALP SERPL-CCNC: 38 U/L (ref 40–150)
ALT SERPL W P-5'-P-CCNC: 16 U/L (ref 0–70)
ALT SERPL W P-5'-P-CCNC: 18 U/L (ref 0–70)
ALT SERPL W P-5'-P-CCNC: 19 U/L (ref 0–70)
ANION GAP SERPL CALCULATED.3IONS-SCNC: 12 MMOL/L (ref 3–14)
ANION GAP SERPL CALCULATED.3IONS-SCNC: 8 MMOL/L (ref 3–14)
ANION GAP SERPL CALCULATED.3IONS-SCNC: 9 MMOL/L (ref 3–14)
ANISOCYTOSIS BLD QL SMEAR: SLIGHT
APTT PPP: 52 SEC (ref 22–37)
AST SERPL W P-5'-P-CCNC: 24 U/L (ref 0–45)
AST SERPL W P-5'-P-CCNC: 30 U/L (ref 0–45)
AST SERPL W P-5'-P-CCNC: 42 U/L (ref 0–45)
BASE DEFICIT BLDA-SCNC: 10 MMOL/L
BASE DEFICIT BLDA-SCNC: 10.2 MMOL/L
BASE DEFICIT BLDA-SCNC: 10.6 MMOL/L
BASE DEFICIT BLDA-SCNC: 8.9 MMOL/L
BASE DEFICIT BLDA-SCNC: 9.6 MMOL/L
BASE DEFICIT BLDA-SCNC: 9.7 MMOL/L
BASOPHILS # BLD AUTO: 0 10E9/L (ref 0–0.2)
BASOPHILS NFR BLD AUTO: 0 %
BASOPHILS NFR BLD AUTO: 0.9 %
BASOPHILS NFR BLD AUTO: 2 %
BILIRUB DIRECT SERPL-MCNC: 0.2 MG/DL (ref 0–0.2)
BILIRUB DIRECT SERPL-MCNC: 0.3 MG/DL (ref 0–0.2)
BILIRUB SERPL-MCNC: 0.6 MG/DL (ref 0.2–1.3)
BILIRUB SERPL-MCNC: 0.7 MG/DL (ref 0.2–1.3)
BILIRUB SERPL-MCNC: 0.7 MG/DL (ref 0.2–1.3)
BLD PROD TYP BPU: NORMAL
BLD UNIT ID BPU: 0
BLOOD PRODUCT CODE: NORMAL
BPU ID: NORMAL
BUN SERPL-MCNC: 47 MG/DL (ref 7–30)
BUN SERPL-MCNC: 53 MG/DL (ref 7–30)
BUN SERPL-MCNC: 59 MG/DL (ref 7–30)
BUN SERPL-MCNC: 59 MG/DL (ref 7–30)
BURR CELLS BLD QL SMEAR: SLIGHT
CA-I BLD-MCNC: 4.4 MG/DL (ref 4.4–5.2)
CALCIUM SERPL-MCNC: 6.7 MG/DL (ref 8.5–10.1)
CALCIUM SERPL-MCNC: 6.8 MG/DL (ref 8.5–10.1)
CALCIUM SERPL-MCNC: 6.9 MG/DL (ref 8.5–10.1)
CALCIUM SERPL-MCNC: 7.2 MG/DL (ref 8.5–10.1)
CHLORIDE SERPL-SCNC: 113 MMOL/L (ref 94–109)
CHLORIDE SERPL-SCNC: 113 MMOL/L (ref 94–109)
CHLORIDE SERPL-SCNC: 114 MMOL/L (ref 94–109)
CHLORIDE SERPL-SCNC: 116 MMOL/L (ref 94–109)
CO2 SERPL-SCNC: 15 MMOL/L (ref 20–32)
CO2 SERPL-SCNC: 15 MMOL/L (ref 20–32)
CO2 SERPL-SCNC: 19 MMOL/L (ref 20–32)
CO2 SERPL-SCNC: 20 MMOL/L (ref 20–32)
CREAT SERPL-MCNC: 2.49 MG/DL (ref 0.66–1.25)
CREAT SERPL-MCNC: 2.69 MG/DL (ref 0.66–1.25)
CREAT SERPL-MCNC: 2.79 MG/DL (ref 0.66–1.25)
CREAT SERPL-MCNC: 2.8 MG/DL (ref 0.66–1.25)
CREAT SERPL-MCNC: 2.89 MG/DL (ref 0.66–1.25)
DIFFERENTIAL METHOD BLD: ABNORMAL
EOSINOPHIL # BLD AUTO: 0 10E9/L (ref 0–0.7)
EOSINOPHIL # BLD AUTO: 0 10E9/L (ref 0–0.7)
EOSINOPHIL # BLD AUTO: 0.1 10E9/L (ref 0–0.7)
EOSINOPHIL NFR BLD AUTO: 0.9 %
EOSINOPHIL NFR BLD AUTO: 1.7 %
EOSINOPHIL NFR BLD AUTO: 5 %
ERYTHROCYTE [DISTWIDTH] IN BLOOD BY AUTOMATED COUNT: 15.2 % (ref 10–15)
ERYTHROCYTE [DISTWIDTH] IN BLOOD BY AUTOMATED COUNT: 15.2 % (ref 10–15)
ERYTHROCYTE [DISTWIDTH] IN BLOOD BY AUTOMATED COUNT: 15.3 % (ref 10–15)
ERYTHROCYTE [DISTWIDTH] IN BLOOD BY AUTOMATED COUNT: 15.4 % (ref 10–15)
FIBRINOGEN PPP-MCNC: 470 MG/DL (ref 200–420)
GFR SERPL CREATININE-BSD FRML MDRD: 23 ML/MIN/1.7M2
GFR SERPL CREATININE-BSD FRML MDRD: 24 ML/MIN/1.7M2
GFR SERPL CREATININE-BSD FRML MDRD: 24 ML/MIN/1.7M2
GFR SERPL CREATININE-BSD FRML MDRD: 25 ML/MIN/1.7M2
GFR SERPL CREATININE-BSD FRML MDRD: 27 ML/MIN/1.7M2
GLUCOSE BLD-MCNC: 316 MG/DL (ref 70–99)
GLUCOSE BLDC GLUCOMTR-MCNC: 135 MG/DL (ref 70–99)
GLUCOSE BLDC GLUCOMTR-MCNC: 140 MG/DL (ref 70–99)
GLUCOSE BLDC GLUCOMTR-MCNC: 145 MG/DL (ref 70–99)
GLUCOSE BLDC GLUCOMTR-MCNC: 167 MG/DL (ref 70–99)
GLUCOSE BLDC GLUCOMTR-MCNC: 177 MG/DL (ref 70–99)
GLUCOSE BLDC GLUCOMTR-MCNC: 191 MG/DL (ref 70–99)
GLUCOSE BLDC GLUCOMTR-MCNC: 195 MG/DL (ref 70–99)
GLUCOSE BLDC GLUCOMTR-MCNC: 242 MG/DL (ref 70–99)
GLUCOSE BLDC GLUCOMTR-MCNC: 253 MG/DL (ref 70–99)
GLUCOSE BLDC GLUCOMTR-MCNC: 273 MG/DL (ref 70–99)
GLUCOSE BLDC GLUCOMTR-MCNC: 353 MG/DL (ref 70–99)
GLUCOSE BLDC GLUCOMTR-MCNC: 97 MG/DL (ref 70–99)
GLUCOSE SERPL-MCNC: 119 MG/DL (ref 70–99)
GLUCOSE SERPL-MCNC: 166 MG/DL (ref 70–99)
GLUCOSE SERPL-MCNC: 238 MG/DL (ref 70–99)
GLUCOSE SERPL-MCNC: 244 MG/DL (ref 70–99)
GRAM STN SPEC: ABNORMAL
GRAM STN SPEC: NORMAL
HCO3 BLD-SCNC: 16 MMOL/L (ref 21–28)
HCO3 BLD-SCNC: 17 MMOL/L (ref 21–28)
HCO3 BLD-SCNC: 17 MMOL/L (ref 21–28)
HCO3 BLD-SCNC: 18 MMOL/L (ref 21–28)
HCT VFR BLD AUTO: 22 % (ref 40–53)
HCT VFR BLD AUTO: 23 % (ref 40–53)
HCT VFR BLD AUTO: 27.7 % (ref 40–53)
HCT VFR BLD AUTO: 29.6 % (ref 40–53)
HGB BLD-MCNC: 6.9 G/DL (ref 13.3–17.7)
HGB BLD-MCNC: 7.3 G/DL (ref 13.3–17.7)
HGB BLD-MCNC: 7.6 G/DL (ref 13.3–17.7)
HGB BLD-MCNC: 9 G/DL (ref 13.3–17.7)
HGB BLD-MCNC: 9.3 G/DL (ref 13.3–17.7)
HGB BLD-MCNC: 9.9 G/DL (ref 13.3–17.7)
INR PPP: 1.92 (ref 0.86–1.14)
INR PPP: 2.16 (ref 0.86–1.14)
INR PPP: 2.37 (ref 0.86–1.14)
INTERPRETATION ECG - MUSE: NORMAL
INTERPRETATION ECG - MUSE: NORMAL
KOH PREP SPEC: NORMAL
LACTATE BLD-SCNC: 1.6 MMOL/L (ref 0.7–2.1)
LACTATE BLD-SCNC: 1.7 MMOL/L (ref 0.7–2.1)
LACTATE BLD-SCNC: 2.1 MMOL/L (ref 0.7–2.1)
LACTATE BLD-SCNC: 2.2 MMOL/L (ref 0.7–2.1)
LACTATE BLD-SCNC: 2.3 MMOL/L (ref 0.7–2.1)
LACTATE SERPL-SCNC: 2.8 MMOL/L (ref 0.4–2)
LDH SERPL L TO P-CCNC: 289 U/L (ref 85–227)
LYMPHOCYTES # BLD AUTO: 0.2 10E9/L (ref 0.8–5.3)
LYMPHOCYTES # BLD AUTO: 0.3 10E9/L (ref 0.8–5.3)
LYMPHOCYTES # BLD AUTO: 0.3 10E9/L (ref 0.8–5.3)
LYMPHOCYTES NFR BLD AUTO: 21.1 %
LYMPHOCYTES NFR BLD AUTO: 22.5 %
LYMPHOCYTES NFR BLD AUTO: 26 %
MAGNESIUM SERPL-MCNC: 2.1 MG/DL (ref 1.6–2.3)
MAGNESIUM SERPL-MCNC: 2.1 MG/DL (ref 1.6–2.3)
MCH RBC QN AUTO: 28.5 PG (ref 26.5–33)
MCH RBC QN AUTO: 28.6 PG (ref 26.5–33)
MCH RBC QN AUTO: 28.8 PG (ref 26.5–33)
MCH RBC QN AUTO: 29.2 PG (ref 26.5–33)
MCHC RBC AUTO-ENTMCNC: 33 G/DL (ref 31.5–36.5)
MCHC RBC AUTO-ENTMCNC: 33.2 G/DL (ref 31.5–36.5)
MCHC RBC AUTO-ENTMCNC: 33.4 G/DL (ref 31.5–36.5)
MCHC RBC AUTO-ENTMCNC: 33.6 G/DL (ref 31.5–36.5)
MCV RBC AUTO: 85 FL (ref 78–100)
MCV RBC AUTO: 86 FL (ref 78–100)
MCV RBC AUTO: 87 FL (ref 78–100)
MCV RBC AUTO: 87 FL (ref 78–100)
METAMYELOCYTES # BLD: 0.1 10E9/L
METAMYELOCYTES # BLD: 0.1 10E9/L
METAMYELOCYTES # BLD: 0.2 10E9/L
METAMYELOCYTES NFR BLD MANUAL: 12.8 %
METAMYELOCYTES NFR BLD MANUAL: 6 %
METAMYELOCYTES NFR BLD MANUAL: 7.3 %
MICRO REPORT STATUS: ABNORMAL
MICRO REPORT STATUS: NORMAL
MICRO REPORT STATUS: NORMAL
MONOCYTES # BLD AUTO: 0.1 10E9/L (ref 0–1.3)
MONOCYTES # BLD AUTO: 0.1 10E9/L (ref 0–1.3)
MONOCYTES # BLD AUTO: 0.4 10E9/L (ref 0–1.3)
MONOCYTES NFR BLD AUTO: 18 %
MONOCYTES NFR BLD AUTO: 32 %
MONOCYTES NFR BLD AUTO: 4.6 %
MYELOCYTES # BLD: 0.1 10E9/L
MYELOCYTES # BLD: 0.1 10E9/L
MYELOCYTES # BLD: 0.2 10E9/L
MYELOCYTES NFR BLD MANUAL: 17.4 %
MYELOCYTES NFR BLD MANUAL: 18.3 %
MYELOCYTES NFR BLD MANUAL: 5 %
NEUTROPHILS # BLD AUTO: 0.3 10E9/L (ref 1.6–8.3)
NEUTROPHILS # BLD AUTO: 0.3 10E9/L (ref 1.6–8.3)
NEUTROPHILS # BLD AUTO: 0.5 10E9/L (ref 1.6–8.3)
NEUTROPHILS NFR BLD AUTO: 24 %
NEUTROPHILS NFR BLD AUTO: 33.1 %
NEUTROPHILS NFR BLD AUTO: 41.4 %
NRBC # BLD AUTO: 0 10*3/UL
NRBC BLD AUTO-RTO: 2 /100
NT-PROBNP SERPL-MCNC: ABNORMAL PG/ML (ref 0–900)
NUM BPU REQUESTED: 1
NUM BPU REQUESTED: 1
NUM BPU REQUESTED: 2
O2/TOTAL GAS SETTING VFR VENT: 40 %
O2/TOTAL GAS SETTING VFR VENT: 50 %
O2/TOTAL GAS SETTING VFR VENT: 60 %
O2/TOTAL GAS SETTING VFR VENT: 70 %
OXYHGB MFR BLD: 96 % (ref 92–100)
PCO2 BLD: 30 MM HG (ref 35–45)
PCO2 BLD: 34 MM HG (ref 35–45)
PCO2 BLD: 35 MM HG (ref 35–45)
PCO2 BLD: 37 MM HG (ref 35–45)
PCO2 BLD: 49 MM HG (ref 35–45)
PCO2 BLD: 51 MM HG (ref 35–45)
PH BLD: 7.14 PH (ref 7.35–7.45)
PH BLD: 7.17 PH (ref 7.35–7.45)
PH BLD: 7.27 PH (ref 7.35–7.45)
PH BLD: 7.27 PH (ref 7.35–7.45)
PH BLD: 7.28 PH (ref 7.35–7.45)
PH BLD: 7.32 PH (ref 7.35–7.45)
PHOSPHATE SERPL-MCNC: 4.8 MG/DL (ref 2.5–4.5)
PHOSPHATE SERPL-MCNC: 5.4 MG/DL (ref 2.5–4.5)
PLATELET # BLD AUTO: 24 10E9/L (ref 150–450)
PLATELET # BLD AUTO: 32 10E9/L (ref 150–450)
PLATELET # BLD AUTO: 36 10E9/L (ref 150–450)
PLATELET # BLD AUTO: 54 10E9/L (ref 150–450)
PLATELET # BLD AUTO: 63 10E9/L (ref 150–450)
PLATELET # BLD EST: ABNORMAL 10*3/UL
PO2 BLD: 124 MM HG (ref 80–105)
PO2 BLD: 155 MM HG (ref 80–105)
PO2 BLD: 169 MM HG (ref 80–105)
PO2 BLD: 180 MM HG (ref 80–105)
PO2 BLD: 188 MM HG (ref 80–105)
PO2 BLD: 57 MM HG (ref 80–105)
POIKILOCYTOSIS BLD QL SMEAR: SLIGHT
POIKILOCYTOSIS BLD QL SMEAR: SLIGHT
POLYCHROMASIA BLD QL SMEAR: SLIGHT
POTASSIUM BLD-SCNC: 5.6 MMOL/L (ref 3.4–5.3)
POTASSIUM BLD-SCNC: 6 MMOL/L (ref 3.4–5.3)
POTASSIUM BLD-SCNC: 6.1 MMOL/L (ref 3.4–5.3)
POTASSIUM SERPL-SCNC: 5.6 MMOL/L (ref 3.4–5.3)
POTASSIUM SERPL-SCNC: 6.2 MMOL/L (ref 3.4–5.3)
POTASSIUM SERPL-SCNC: 6.2 MMOL/L (ref 3.4–5.3)
POTASSIUM SERPL-SCNC: 6.3 MMOL/L (ref 3.4–5.3)
POTASSIUM SERPL-SCNC: 6.6 MMOL/L (ref 3.4–5.3)
PROT SERPL-MCNC: 4.4 G/DL (ref 6.8–8.8)
PROT SERPL-MCNC: 4.5 G/DL (ref 6.8–8.8)
PROT SERPL-MCNC: 4.5 G/DL (ref 6.8–8.8)
RBC # BLD AUTO: 2.55 10E12/L (ref 4.4–5.9)
RBC # BLD AUTO: 2.64 10E12/L (ref 4.4–5.9)
RBC # BLD AUTO: 3.18 10E12/L (ref 4.4–5.9)
RBC # BLD AUTO: 3.47 10E12/L (ref 4.4–5.9)
SODIUM BLD-SCNC: 137 MMOL/L (ref 133–144)
SODIUM SERPL-SCNC: 140 MMOL/L (ref 133–144)
SODIUM SERPL-SCNC: 141 MMOL/L (ref 133–144)
SODIUM SERPL-SCNC: 142 MMOL/L (ref 133–144)
SODIUM SERPL-SCNC: 144 MMOL/L (ref 133–144)
SPECIMEN SOURCE: ABNORMAL
SPECIMEN SOURCE: NORMAL
SPECIMEN SOURCE: NORMAL
TACROLIMUS BLD-MCNC: 5 UG/L (ref 5–15)
TME LAST DOSE: NORMAL H
TRANSFUSION STATUS PATIENT QL: NORMAL
TRIGL SERPL-MCNC: 174 MG/DL
WBC # BLD AUTO: 0.8 10E9/L (ref 4–11)
WBC # BLD AUTO: 0.9 10E9/L (ref 4–11)
WBC # BLD AUTO: 1.2 10E9/L (ref 4–11)
WBC # BLD AUTO: 1.3 10E9/L (ref 4–11)

## 2017-07-05 PROCEDURE — 40000196 ZZH STATISTIC RAPCV CVP MONITORING

## 2017-07-05 PROCEDURE — 87077 CULTURE AEROBIC IDENTIFY: CPT | Performed by: SURGERY

## 2017-07-05 PROCEDURE — 87205 SMEAR GRAM STAIN: CPT | Performed by: SURGERY

## 2017-07-05 PROCEDURE — 85027 COMPLETE CBC AUTOMATED: CPT | Performed by: PHYSICIAN ASSISTANT

## 2017-07-05 PROCEDURE — 71010 XR CHEST PORT 1 VW: CPT

## 2017-07-05 PROCEDURE — 85049 AUTOMATED PLATELET COUNT: CPT | Performed by: PHYSICIAN ASSISTANT

## 2017-07-05 PROCEDURE — 25000125 ZZHC RX 250: Performed by: SURGERY

## 2017-07-05 PROCEDURE — 25000128 H RX IP 250 OP 636: Performed by: NURSE ANESTHETIST, CERTIFIED REGISTERED

## 2017-07-05 PROCEDURE — 82803 BLOOD GASES ANY COMBINATION: CPT | Performed by: SURGERY

## 2017-07-05 PROCEDURE — 87186 SC STD MICRODIL/AGAR DIL: CPT | Performed by: TRANSPLANT SURGERY

## 2017-07-05 PROCEDURE — 87210 SMEAR WET MOUNT SALINE/INK: CPT | Performed by: SURGERY

## 2017-07-05 PROCEDURE — 25000128 H RX IP 250 OP 636: Performed by: PHYSICIAN ASSISTANT

## 2017-07-05 PROCEDURE — 82805 BLOOD GASES W/O2 SATURATION: CPT | Performed by: SURGERY

## 2017-07-05 PROCEDURE — 40000008 ZZH STATISTIC AIRWAY CARE

## 2017-07-05 PROCEDURE — 25000131 ZZH RX MED GY IP 250 OP 636 PS 637: Performed by: TRANSPLANT SURGERY

## 2017-07-05 PROCEDURE — 00000146 ZZHCL STATISTIC GLUCOSE BY METER IP

## 2017-07-05 PROCEDURE — 87077 CULTURE AEROBIC IDENTIFY: CPT | Performed by: TRANSPLANT SURGERY

## 2017-07-05 PROCEDURE — P9047 ALBUMIN (HUMAN), 25%, 50ML: HCPCS | Performed by: SURGERY

## 2017-07-05 PROCEDURE — 87205 SMEAR GRAM STAIN: CPT | Performed by: TRANSPLANT SURGERY

## 2017-07-05 PROCEDURE — 25000125 ZZHC RX 250: Performed by: TRANSPLANT SURGERY

## 2017-07-05 PROCEDURE — 76700 US EXAM ABDOM COMPLETE: CPT | Mod: XS

## 2017-07-05 PROCEDURE — 25000125 ZZHC RX 250: Performed by: STUDENT IN AN ORGANIZED HEALTH CARE EDUCATION/TRAINING PROGRAM

## 2017-07-05 PROCEDURE — 80053 COMPREHEN METABOLIC PANEL: CPT | Performed by: PHYSICIAN ASSISTANT

## 2017-07-05 PROCEDURE — 25000565 ZZH ISOFLURANE, EA 15 MIN: Performed by: TRANSPLANT SURGERY

## 2017-07-05 PROCEDURE — 86141 C-REACTIVE PROTEIN HS: CPT | Performed by: STUDENT IN AN ORGANIZED HEALTH CARE EDUCATION/TRAINING PROGRAM

## 2017-07-05 PROCEDURE — P9041 ALBUMIN (HUMAN),5%, 50ML: HCPCS | Performed by: SURGERY

## 2017-07-05 PROCEDURE — P9037 PLATE PHERES LEUKOREDU IRRAD: HCPCS | Performed by: STUDENT IN AN ORGANIZED HEALTH CARE EDUCATION/TRAINING PROGRAM

## 2017-07-05 PROCEDURE — 0B9M8ZX DRAINAGE OF BILATERAL LUNGS, VIA NATURAL OR ARTIFICIAL OPENING ENDOSCOPIC, DIAGNOSTIC: ICD-10-PCS | Performed by: SURGERY

## 2017-07-05 PROCEDURE — P9041 ALBUMIN (HUMAN),5%, 50ML: HCPCS | Performed by: NURSE ANESTHETIST, CERTIFIED REGISTERED

## 2017-07-05 PROCEDURE — 83735 ASSAY OF MAGNESIUM: CPT | Performed by: PHYSICIAN ASSISTANT

## 2017-07-05 PROCEDURE — 40000275 ZZH STATISTIC RCP TIME EA 10 MIN

## 2017-07-05 PROCEDURE — 84100 ASSAY OF PHOSPHORUS: CPT | Performed by: SURGERY

## 2017-07-05 PROCEDURE — 80076 HEPATIC FUNCTION PANEL: CPT | Performed by: PHYSICIAN ASSISTANT

## 2017-07-05 PROCEDURE — 85610 PROTHROMBIN TIME: CPT | Performed by: SURGERY

## 2017-07-05 PROCEDURE — 25000125 ZZHC RX 250: Performed by: NURSE ANESTHETIST, CERTIFIED REGISTERED

## 2017-07-05 PROCEDURE — 40000014 ZZH STATISTIC ARTERIAL MONITORING DAILY

## 2017-07-05 PROCEDURE — 25800025 ZZH RX 258: Performed by: STUDENT IN AN ORGANIZED HEALTH CARE EDUCATION/TRAINING PROGRAM

## 2017-07-05 PROCEDURE — 82565 ASSAY OF CREATININE: CPT | Performed by: PHYSICIAN ASSISTANT

## 2017-07-05 PROCEDURE — 84132 ASSAY OF SERUM POTASSIUM: CPT | Performed by: TRANSPLANT SURGERY

## 2017-07-05 PROCEDURE — 85384 FIBRINOGEN ACTIVITY: CPT | Performed by: SURGERY

## 2017-07-05 PROCEDURE — 25000128 H RX IP 250 OP 636: Performed by: TRANSPLANT SURGERY

## 2017-07-05 PROCEDURE — 85240 CLOT FACTOR VIII AHG 1 STAGE: CPT | Performed by: STUDENT IN AN ORGANIZED HEALTH CARE EDUCATION/TRAINING PROGRAM

## 2017-07-05 PROCEDURE — 99291 CRITICAL CARE FIRST HOUR: CPT | Performed by: PHYSICIAN ASSISTANT

## 2017-07-05 PROCEDURE — 85397 CLOTTING FUNCT ACTIVITY: CPT | Performed by: STUDENT IN AN ORGANIZED HEALTH CARE EDUCATION/TRAINING PROGRAM

## 2017-07-05 PROCEDURE — 87070 CULTURE OTHR SPECIMN AEROBIC: CPT | Performed by: SURGERY

## 2017-07-05 PROCEDURE — 85730 THROMBOPLASTIN TIME PARTIAL: CPT | Performed by: SURGERY

## 2017-07-05 PROCEDURE — 80048 BASIC METABOLIC PNL TOTAL CA: CPT | Performed by: PHYSICIAN ASSISTANT

## 2017-07-05 PROCEDURE — 84478 ASSAY OF TRIGLYCERIDES: CPT | Performed by: SURGERY

## 2017-07-05 PROCEDURE — 40000344 ZZHCL STATISTIC THAWING COMPONENT: Performed by: TRANSPLANT SURGERY

## 2017-07-05 PROCEDURE — 84134 ASSAY OF PREALBUMIN: CPT | Performed by: SURGERY

## 2017-07-05 PROCEDURE — 83605 ASSAY OF LACTIC ACID: CPT | Performed by: SURGERY

## 2017-07-05 PROCEDURE — 25000132 ZZH RX MED GY IP 250 OP 250 PS 637

## 2017-07-05 PROCEDURE — 85025 COMPLETE CBC W/AUTO DIFF WBC: CPT | Performed by: SURGERY

## 2017-07-05 PROCEDURE — 20000004 ZZH R&B ICU UMMC

## 2017-07-05 PROCEDURE — 36000057 ZZH SURGERY LEVEL 3 1ST 30 MIN - UMMC: Performed by: TRANSPLANT SURGERY

## 2017-07-05 PROCEDURE — 25000128 H RX IP 250 OP 636: Performed by: SURGERY

## 2017-07-05 PROCEDURE — 25000132 ZZH RX MED GY IP 250 OP 250 PS 637: Performed by: TRANSPLANT SURGERY

## 2017-07-05 PROCEDURE — 25000131 ZZH RX MED GY IP 250 OP 636 PS 637: Performed by: STUDENT IN AN ORGANIZED HEALTH CARE EDUCATION/TRAINING PROGRAM

## 2017-07-05 PROCEDURE — 83735 ASSAY OF MAGNESIUM: CPT | Performed by: SURGERY

## 2017-07-05 PROCEDURE — 85260 CLOT FACTOR X STUART-POWER: CPT | Performed by: STUDENT IN AN ORGANIZED HEALTH CARE EDUCATION/TRAINING PROGRAM

## 2017-07-05 PROCEDURE — 25000125 ZZHC RX 250

## 2017-07-05 PROCEDURE — 0WJG0ZZ INSPECTION OF PERITONEAL CAVITY, OPEN APPROACH: ICD-10-PCS | Performed by: TRANSPLANT SURGERY

## 2017-07-05 PROCEDURE — 84132 ASSAY OF SERUM POTASSIUM: CPT | Performed by: PHYSICIAN ASSISTANT

## 2017-07-05 PROCEDURE — 85018 HEMOGLOBIN: CPT | Performed by: PHYSICIAN ASSISTANT

## 2017-07-05 PROCEDURE — 85610 PROTHROMBIN TIME: CPT | Performed by: PHYSICIAN ASSISTANT

## 2017-07-05 PROCEDURE — 25000128 H RX IP 250 OP 636: Performed by: STUDENT IN AN ORGANIZED HEALTH CARE EDUCATION/TRAINING PROGRAM

## 2017-07-05 PROCEDURE — 37000008 ZZH ANESTHESIA TECHNICAL FEE, 1ST 30 MIN: Performed by: TRANSPLANT SURGERY

## 2017-07-05 PROCEDURE — 87186 SC STD MICRODIL/AGAR DIL: CPT | Performed by: SURGERY

## 2017-07-05 PROCEDURE — 84100 ASSAY OF PHOSPHORUS: CPT | Performed by: PHYSICIAN ASSISTANT

## 2017-07-05 PROCEDURE — 80197 ASSAY OF TACROLIMUS: CPT | Performed by: TRANSPLANT SURGERY

## 2017-07-05 PROCEDURE — 82803 BLOOD GASES ANY COMBINATION: CPT | Performed by: TRANSPLANT SURGERY

## 2017-07-05 PROCEDURE — 25000131 ZZH RX MED GY IP 250 OP 636 PS 637: Performed by: SURGERY

## 2017-07-05 PROCEDURE — 94003 VENT MGMT INPAT SUBQ DAY: CPT

## 2017-07-05 PROCEDURE — 85025 COMPLETE CBC W/AUTO DIFF WBC: CPT | Performed by: STUDENT IN AN ORGANIZED HEALTH CARE EDUCATION/TRAINING PROGRAM

## 2017-07-05 PROCEDURE — 83010 ASSAY OF HAPTOGLOBIN QUANT: CPT | Performed by: STUDENT IN AN ORGANIZED HEALTH CARE EDUCATION/TRAINING PROGRAM

## 2017-07-05 PROCEDURE — 80053 COMPREHEN METABOLIC PANEL: CPT | Performed by: SURGERY

## 2017-07-05 PROCEDURE — 25000125 ZZHC RX 250: Performed by: PHYSICIAN ASSISTANT

## 2017-07-05 PROCEDURE — 27210136 ZZH KIT CATH ARTERIAL EXT SUPPLY

## 2017-07-05 PROCEDURE — 25800025 ZZH RX 258: Performed by: TRANSPLANT SURGERY

## 2017-07-05 PROCEDURE — 82330 ASSAY OF CALCIUM: CPT | Performed by: TRANSPLANT SURGERY

## 2017-07-05 PROCEDURE — 83605 ASSAY OF LACTIC ACID: CPT | Performed by: TRANSPLANT SURGERY

## 2017-07-05 PROCEDURE — 87040 BLOOD CULTURE FOR BACTERIA: CPT | Performed by: STUDENT IN AN ORGANIZED HEALTH CARE EDUCATION/TRAINING PROGRAM

## 2017-07-05 PROCEDURE — 87075 CULTR BACTERIA EXCEPT BLOOD: CPT | Performed by: TRANSPLANT SURGERY

## 2017-07-05 PROCEDURE — 84295 ASSAY OF SERUM SODIUM: CPT | Performed by: TRANSPLANT SURGERY

## 2017-07-05 PROCEDURE — 87102 FUNGUS ISOLATION CULTURE: CPT | Performed by: SURGERY

## 2017-07-05 PROCEDURE — 83615 LACTATE (LD) (LDH) ENZYME: CPT | Performed by: STUDENT IN AN ORGANIZED HEALTH CARE EDUCATION/TRAINING PROGRAM

## 2017-07-05 PROCEDURE — P9037 PLATE PHERES LEUKOREDU IRRAD: HCPCS | Performed by: SURGERY

## 2017-07-05 PROCEDURE — 31624 DX BRONCHOSCOPE/LAVAGE: CPT

## 2017-07-05 PROCEDURE — 82248 BILIRUBIN DIRECT: CPT | Performed by: SURGERY

## 2017-07-05 PROCEDURE — P9016 RBC LEUKOCYTES REDUCED: HCPCS | Performed by: TRANSPLANT SURGERY

## 2017-07-05 PROCEDURE — 37000009 ZZH ANESTHESIA TECHNICAL FEE, EACH ADDTL 15 MIN: Performed by: TRANSPLANT SURGERY

## 2017-07-05 PROCEDURE — 36620 INSERTION CATHETER ARTERY: CPT

## 2017-07-05 PROCEDURE — 27210794 ZZH OR GENERAL SUPPLY STERILE: Performed by: TRANSPLANT SURGERY

## 2017-07-05 PROCEDURE — 87070 CULTURE OTHR SPECIMN AEROBIC: CPT | Performed by: TRANSPLANT SURGERY

## 2017-07-05 PROCEDURE — 83880 ASSAY OF NATRIURETIC PEPTIDE: CPT | Performed by: STUDENT IN AN ORGANIZED HEALTH CARE EDUCATION/TRAINING PROGRAM

## 2017-07-05 PROCEDURE — 82947 ASSAY GLUCOSE BLOOD QUANT: CPT | Performed by: TRANSPLANT SURGERY

## 2017-07-05 PROCEDURE — 36000059 ZZH SURGERY LEVEL 3 EA 15 ADDTL MIN UMMC: Performed by: TRANSPLANT SURGERY

## 2017-07-05 RX ORDER — DEXTROSE MONOHYDRATE 100 MG/ML
INJECTION, SOLUTION INTRAVENOUS CONTINUOUS
Status: ACTIVE | OUTPATIENT
Start: 2017-07-05 | End: 2017-07-05

## 2017-07-05 RX ORDER — FENTANYL CITRATE 50 UG/ML
INJECTION, SOLUTION INTRAMUSCULAR; INTRAVENOUS PRN
Status: DISCONTINUED | OUTPATIENT
Start: 2017-07-05 | End: 2017-07-05

## 2017-07-05 RX ORDER — SODIUM CHLORIDE, SODIUM LACTATE, POTASSIUM CHLORIDE, CALCIUM CHLORIDE 600; 310; 30; 20 MG/100ML; MG/100ML; MG/100ML; MG/100ML
INJECTION, SOLUTION INTRAVENOUS CONTINUOUS PRN
Status: DISCONTINUED | OUTPATIENT
Start: 2017-07-05 | End: 2017-07-05

## 2017-07-05 RX ORDER — HYDROMORPHONE HYDROCHLORIDE 1 MG/ML
0.5 INJECTION, SOLUTION INTRAMUSCULAR; INTRAVENOUS; SUBCUTANEOUS
Status: DISCONTINUED | OUTPATIENT
Start: 2017-07-05 | End: 2017-07-06

## 2017-07-05 RX ORDER — ALBUMIN, HUMAN INJ 5% 5 %
25 SOLUTION INTRAVENOUS ONCE
Status: COMPLETED | OUTPATIENT
Start: 2017-07-05 | End: 2017-07-05

## 2017-07-05 RX ORDER — FUROSEMIDE 10 MG/ML
80 INJECTION INTRAMUSCULAR; INTRAVENOUS ONCE
Status: DISCONTINUED | OUTPATIENT
Start: 2017-07-05 | End: 2017-07-05

## 2017-07-05 RX ORDER — NICOTINE POLACRILEX 4 MG
15-30 LOZENGE BUCCAL
Status: DISCONTINUED | OUTPATIENT
Start: 2017-07-05 | End: 2017-07-25

## 2017-07-05 RX ORDER — ALBUMIN, HUMAN INJ 5% 5 %
SOLUTION INTRAVENOUS CONTINUOUS PRN
Status: DISCONTINUED | OUTPATIENT
Start: 2017-07-05 | End: 2017-07-05

## 2017-07-05 RX ORDER — LIDOCAINE HYDROCHLORIDE 10 MG/ML
INJECTION, SOLUTION EPIDURAL; INFILTRATION; INTRACAUDAL; PERINEURAL
Status: COMPLETED
Start: 2017-07-05 | End: 2017-07-05

## 2017-07-05 RX ORDER — SODIUM CHLORIDE 9 MG/ML
INJECTION, SOLUTION INTRAVENOUS CONTINUOUS
Status: DISCONTINUED | OUTPATIENT
Start: 2017-07-05 | End: 2017-07-10

## 2017-07-05 RX ORDER — PIPERACILLIN SODIUM, TAZOBACTAM SODIUM 2; .25 G/10ML; G/10ML
2.25 INJECTION, POWDER, LYOPHILIZED, FOR SOLUTION INTRAVENOUS EVERY 6 HOURS
Status: DISCONTINUED | OUTPATIENT
Start: 2017-07-05 | End: 2017-07-06

## 2017-07-05 RX ORDER — FUROSEMIDE 10 MG/ML
80 INJECTION INTRAMUSCULAR; INTRAVENOUS ONCE
Status: COMPLETED | OUTPATIENT
Start: 2017-07-05 | End: 2017-07-05

## 2017-07-05 RX ORDER — DEXTROSE MONOHYDRATE 25 G/50ML
25 INJECTION, SOLUTION INTRAVENOUS ONCE
Status: COMPLETED | OUTPATIENT
Start: 2017-07-05 | End: 2017-07-05

## 2017-07-05 RX ORDER — DEXTROSE MONOHYDRATE 25 G/50ML
25-50 INJECTION, SOLUTION INTRAVENOUS
Status: DISCONTINUED | OUTPATIENT
Start: 2017-07-05 | End: 2017-07-25

## 2017-07-05 RX ADMIN — HUMAN INSULIN 9.5 UNITS: 100 INJECTION, SOLUTION SUBCUTANEOUS at 07:16

## 2017-07-05 RX ADMIN — ALBUMIN (HUMAN) 12.5 G: 12.5 SOLUTION INTRAVENOUS at 12:57

## 2017-07-05 RX ADMIN — FLUDROCORTISONE ACETATE 0.1 MG: 0.1 TABLET ORAL at 13:12

## 2017-07-05 RX ADMIN — HUMAN INSULIN 2 UNITS/HR: 100 INJECTION, SOLUTION SUBCUTANEOUS at 08:40

## 2017-07-05 RX ADMIN — LIDOCAINE HYDROCHLORIDE 1 ML: 10 INJECTION, SOLUTION EPIDURAL; INFILTRATION; INTRACAUDAL; PERINEURAL at 12:33

## 2017-07-05 RX ADMIN — MIDAZOLAM HYDROCHLORIDE 1 MG: 1 INJECTION, SOLUTION INTRAMUSCULAR; INTRAVENOUS at 08:40

## 2017-07-05 RX ADMIN — SODIUM CHLORIDE 1000 ML: 9 INJECTION, SOLUTION INTRAVENOUS at 16:00

## 2017-07-05 RX ADMIN — PIPERACILLIN AND TAZOBACTAM 3.38 G: 3; .375 INJECTION, POWDER, LYOPHILIZED, FOR SOLUTION INTRAVENOUS; PARENTERAL at 02:38

## 2017-07-05 RX ADMIN — SODIUM CHLORIDE, POTASSIUM CHLORIDE, SODIUM LACTATE AND CALCIUM CHLORIDE: 600; 310; 30; 20 INJECTION, SOLUTION INTRAVENOUS at 08:15

## 2017-07-05 RX ADMIN — TACROLIMUS 3 MG: 5 CAPSULE ORAL at 18:27

## 2017-07-05 RX ADMIN — FENTANYL CITRATE 50 MCG: 50 INJECTION, SOLUTION INTRAMUSCULAR; INTRAVENOUS at 08:47

## 2017-07-05 RX ADMIN — CALCIUM GLUCONATE 1 G: 94 INJECTION, SOLUTION INTRAVENOUS at 07:15

## 2017-07-05 RX ADMIN — ROCURONIUM BROMIDE 20 MG: 10 INJECTION INTRAVENOUS at 08:39

## 2017-07-05 RX ADMIN — ROCURONIUM BROMIDE 50 MG: 10 INJECTION INTRAVENOUS at 08:15

## 2017-07-05 RX ADMIN — HYDROMORPHONE HYDROCHLORIDE 0.5 MG: 1 INJECTION, SOLUTION INTRAMUSCULAR; INTRAVENOUS; SUBCUTANEOUS at 00:25

## 2017-07-05 RX ADMIN — FILGRASTIM 480 MCG: 480 INJECTION, SOLUTION INTRAVENOUS; SUBCUTANEOUS at 20:10

## 2017-07-05 RX ADMIN — PIPERACILLIN AND TAZOBACTAM 2.25 G: 2; .25 INJECTION, POWDER, LYOPHILIZED, FOR SOLUTION INTRAVENOUS; PARENTERAL at 20:52

## 2017-07-05 RX ADMIN — Medication 25 G: at 07:16

## 2017-07-05 RX ADMIN — PHENYLEPHRINE HYDROCHLORIDE 100 MCG: 10 INJECTION, SOLUTION INTRAMUSCULAR; INTRAVENOUS; SUBCUTANEOUS at 08:58

## 2017-07-05 RX ADMIN — ALBUMIN (HUMAN) 25 G: 12.5 SOLUTION INTRAVENOUS at 14:21

## 2017-07-05 RX ADMIN — METRONIDAZOLE 500 MG: 500 INJECTION, SOLUTION INTRAVENOUS at 18:23

## 2017-07-05 RX ADMIN — PANTOPRAZOLE SODIUM 40 MG: 40 INJECTION, POWDER, FOR SOLUTION INTRAVENOUS at 19:05

## 2017-07-05 RX ADMIN — LIDOCAINE HYDROCHLORIDE 10 ML: 10 INJECTION, SOLUTION EPIDURAL; INFILTRATION; INTRACAUDAL; PERINEURAL at 14:06

## 2017-07-05 RX ADMIN — RANITIDINE HYDROCHLORIDE 50 MG: 25 INJECTION INTRAMUSCULAR; INTRAVENOUS at 05:09

## 2017-07-05 RX ADMIN — CALCIUM GLUCONATE 50 ML/HR: 94 INJECTION, SOLUTION INTRAVENOUS at 08:02

## 2017-07-05 RX ADMIN — FENTANYL CITRATE 100 MCG/HR: 50 INJECTION, SOLUTION INTRAMUSCULAR; INTRAVENOUS at 18:20

## 2017-07-05 RX ADMIN — SODIUM CHLORIDE 1000 ML: 9 INJECTION, SOLUTION INTRAVENOUS at 10:53

## 2017-07-05 RX ADMIN — HUMAN INSULIN 4 UNITS/HR: 100 INJECTION, SOLUTION SUBCUTANEOUS at 14:06

## 2017-07-05 RX ADMIN — RANITIDINE HYDROCHLORIDE 50 MG: 25 INJECTION INTRAMUSCULAR; INTRAVENOUS at 14:22

## 2017-07-05 RX ADMIN — METRONIDAZOLE 500 MG: 500 INJECTION, SOLUTION INTRAVENOUS at 03:36

## 2017-07-05 RX ADMIN — MIDAZOLAM 2 MG: 1 INJECTION INTRAMUSCULAR; INTRAVENOUS at 23:25

## 2017-07-05 RX ADMIN — FENTANYL CITRATE 100 MCG/HR: 50 INJECTION, SOLUTION INTRAMUSCULAR; INTRAVENOUS at 04:45

## 2017-07-05 RX ADMIN — TACROLIMUS 4 MG: 5 CAPSULE ORAL at 07:27

## 2017-07-05 RX ADMIN — FUROSEMIDE 80 MG: 10 INJECTION, SOLUTION INTRAVENOUS at 07:11

## 2017-07-05 RX ADMIN — DEXTROSE MONOHYDRATE: 100 INJECTION, SOLUTION INTRAVENOUS at 07:21

## 2017-07-05 RX ADMIN — INSULIN ASPART 3 UNITS: 100 INJECTION, SOLUTION INTRAVENOUS; SUBCUTANEOUS at 04:12

## 2017-07-05 RX ADMIN — SODIUM BICARBONATE 50 MEQ: 84 INJECTION INTRAVENOUS at 09:10

## 2017-07-05 RX ADMIN — MIDAZOLAM 2 MG: 1 INJECTION INTRAMUSCULAR; INTRAVENOUS at 22:47

## 2017-07-05 RX ADMIN — PHENYLEPHRINE HYDROCHLORIDE 100 MCG: 10 INJECTION, SOLUTION INTRAMUSCULAR; INTRAVENOUS; SUBCUTANEOUS at 08:53

## 2017-07-05 RX ADMIN — MAGNESIUM SULFATE HEPTAHYDRATE: 500 INJECTION, SOLUTION INTRAMUSCULAR; INTRAVENOUS at 20:38

## 2017-07-05 RX ADMIN — MIDAZOLAM 2 MG: 1 INJECTION INTRAMUSCULAR; INTRAVENOUS at 20:05

## 2017-07-05 RX ADMIN — SODIUM CHLORIDE, PRESERVATIVE FREE: 5 INJECTION INTRAVENOUS at 05:07

## 2017-07-05 RX ADMIN — ALBUMIN (HUMAN) 12.5 G: 12.5 SOLUTION INTRAVENOUS at 07:02

## 2017-07-05 RX ADMIN — MIDAZOLAM HYDROCHLORIDE 1 MG: 1 INJECTION, SOLUTION INTRAMUSCULAR; INTRAVENOUS at 08:15

## 2017-07-05 RX ADMIN — ALBUMIN (HUMAN): 12.5 SOLUTION INTRAVENOUS at 08:58

## 2017-07-05 RX ADMIN — SODIUM BICARBONATE 50 MEQ: 84 INJECTION INTRAVENOUS at 08:30

## 2017-07-05 RX ADMIN — PROPOFOL 35 MCG/KG/MIN: 10 INJECTION, EMULSION INTRAVENOUS at 03:47

## 2017-07-05 RX ADMIN — NOREPINEPHRINE BITARTRATE 0.14 MCG/KG/MIN: 1 INJECTION INTRAVENOUS at 05:07

## 2017-07-05 RX ADMIN — ALBUMIN (HUMAN) 12.5 G: 12.5 SOLUTION INTRAVENOUS at 18:35

## 2017-07-05 RX ADMIN — SODIUM CHLORIDE 1000 ML: 9 INJECTION, SOLUTION INTRAVENOUS at 18:58

## 2017-07-05 RX ADMIN — SODIUM CHLORIDE, PRESERVATIVE FREE: 5 INJECTION INTRAVENOUS at 22:47

## 2017-07-05 RX ADMIN — PIPERACILLIN AND TAZOBACTAM 3.38 G: 3; .375 INJECTION, POWDER, LYOPHILIZED, FOR SOLUTION INTRAVENOUS; PARENTERAL at 08:04

## 2017-07-05 RX ADMIN — PIPERACILLIN AND TAZOBACTAM 2.25 G: 2; .25 INJECTION, POWDER, LYOPHILIZED, FOR SOLUTION INTRAVENOUS; PARENTERAL at 14:24

## 2017-07-05 NOTE — ANESTHESIA CARE TRANSFER NOTE
Patient: Camacho Bhagat    Procedure(s):  Exploratory Laparotomy, Washout with closure. - Wound Class: II-Clean Contaminated    Diagnosis: Typhlitis  Diagnosis Additional Information: No value filed.    Anesthesia Type:   General, ETT     Note:  Airway :ETT  Patient transferred to:ICU  Comments: Pt transported intubated and sedated to ICU. Respirations assisted via Ambu during transport with 100% O2. Placed on vent on arrival to unit -- FiO2 60%, , RR 12, PEEP 5. VSS during transport and on arrival to ICU. Remains on Propofol, TPN, Norepi, and Insulin gtts. Report shared with RN.      Vitals: (Last set prior to Anesthesia Care Transfer)    CRNA VITALS  7/5/2017 0905 - 7/5/2017 0947      7/5/2017             Ht Rate: 183                Electronically Signed By: LAMIN Owusu CRNA  July 5, 2017  9:47 AM

## 2017-07-05 NOTE — CONSULTS
Nephrology Initial Consult  July 5, 2017      Camacho Bhagat MRN:[id number] YOB: 1965  Date of Admission:7/4/2017  Primary care provider: Shay Kirkpatrick  Requesting physician: Reshma Albert*    ASSESSMENT AND RECOMMENDATIONS:   Camacho Bhagat is a 51 year old man with h/o cirrhosis s/p OLT 3/2017 who is admitted with neutropenia and colitis, s/p exlap yesterday, no bowel removed but did have large insensible losses  1) hyperkalemia  - due to reduced GFR along with decreased distal sodium delivery, tacrolimus, acidosis.  Given nearly non-oliguric state this can likely be managed medically.  To increase distal sodium delivery, I recommend intermittent normal saline bolus of 500 ml to achieve CVP of ~10.  In the event of worsening pulmonary edema, he is likely to respond to furosemide which will also increase potassium excretion.  Kayexalate contraindicated in post op state as gives risk for bowel necrosis   Repeat K every 2 hr x24hr  2) acute kidney injury - baseline creatinine is 1.5 - 1.8 mg/dL since OLT 3/2017.  He is non-oliguric WITH IV furosemide in the last 24 hours. Current is creatinine is 2.9 mg/dL.  Tacrolimus trough is about 10, unlikely FK toxicity but he does have dropping platelets and hemoglobin which brings to mind TMA type process which can be associated with tacrolimus regardless of trough levels.  Agree with checking hapto and LDH.  EJ is most likely related to sepsis and kidney hypoperfusion, keep blood pressure under control avoid hypotension , keep map >65 , avoid nephrotoxic medication, renally adjust all antibiotics.  Volume expand with IV  Normal saline to keep CVP closer to 10, trend renal labs.   3) nongap metabolic acidosis with hypercapnia   Repeat blood gas , manage the vents hyperventilate to decrease Co2 retention.  Managing further with IV bicarbonate (if available) would help to shift potassium intracellularly.  4) Normocytic Normochromic Anemia not  specified   Monitor H&H , S/P 3 unit PRBC, working up hemolytic process  5) hypotension - CVP is 7, would like to see it closer to 10 given need for vasopressors.  Patients on CNI are sensitive for fluctuations in volume and generally kidney does better with a little more volume on board.    Thank you for this consult, will follow up with you.   Recommendations were communicated to primary team via Zainabfrida Eaton and discussed with Dr. Betty Higginbotham MD   [phone]      REASON FOR CONSULT: Acute kidney injury and hyperkalemia     HISTORY OF PRESENT ILLNESS:  Camacho Bhagat is a 51 year old male history and review of system is limited due to pt is being intubated and sedated most of the history are obtained from the chart, Patient is with PMH significant for liver transplant for ESLD due to CASTAÑEDA (March2017), history of diabetes and HTN who presents with abdominal pain and fever. The pain started in Friday and has progressively gotten worse. Started in the umbilical region and then radiated to left and right iliac regions. No associated symptoms of nausea vomiting, fever, or diarrhea. The pain is darting in nature and is continuous but has episodes of increase in intensity. He went to the ED at [facility] and was found to have colitis on CT scan.  He was transferred to our care given history of liver transplant.  Given findings of lactic acidosis and hypotension, there was concern for ischemic or necrotic bowel so he had exlap last night.  No necrotic bowel was found and he did not have resection but was left open and had large insensible losses overnight.  In this setting his creatinine oriana to around 2.3g/dL with potassium of 6 and declining UOP.   He was given bicarb,  IV fluid LR, he was also in broad spectrum antibiotics vanc, zosyn and flagyl, micofungen.  He is on two pressors.  His UOP is average 30ml/hr, his potassium still elevated 6.0, Cr trending up now 2.7 mg/dL, he had no previous history of  kidney disease and his previous renal ultrasound is normal nephrology consult was called to evaluate and manage.   He was taken back to OR This am and abdomen was closed.    PAST MEDICAL HISTORY:  Reviewed with patient on 07/05/2017   Pt intubated and sedated   Past Medical History:   Diagnosis Date     Cancer (H)      Depressive disorder, not elsewhere classified      Esophageal reflux      Fibromyalgia 1/2009    dx with Dr Benitez( Rheum)     History of thrombophlebitis      Osteoarthritis      Other acute embolism veins 11/01    Deep vein thrombophlebitis, filter placed     Other and unspecified hyperlipidemia      Other chronic nonalcoholic liver disease     Fatty liver      Other testicular hypofunction      Pneumonia, organism 10-01    Included ARDS, sepsis, and  acute renal failure; hospitalized     Rheumatoid arthritis(714.0)      Type II or unspecified type diabetes mellitus without mention of complication, not stated as uncontrolled 11/01    Managed by endocrinology     Unspecified essential hypertension 11/01    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     Unspecified sleep apnea     Uses BiPAP       Past Surgical History:   Procedure Laterality Date     BENCH LIVER N/A 3/4/2017    Procedure: BENCH LIVER;  Surgeon: Jovan Tran MD;  Location: [facility] OR     C NONSPECIFIC PROCEDURE      tracheostomy     C NONSPECIFIC PROCEDURE      repair of deviated septum     C NONSPECIFIC PROCEDURE  2007    Rt knee arthroscopy     C TOTAL KNEE ARTHROPLASTY  2008    Right knee arthroscopy     CHOLECYSTECTOMY       ESOPHAGOSCOPY, GASTROSCOPY, DUODENOSCOPY (EGD), COMBINED N/A 8/4/2016    Procedure: COMBINED ESOPHAGOSCOPY, GASTROSCOPY, DUODENOSCOPY (EGD), BIOPSY SINGLE OR MULTIPLE;  Surgeon: Trent Pederson MD;  Location:  GI     LAPAROTOMY EXPLORATORY N/A 7/4/2017    Procedure: LAPAROTOMY EXPLORATORY;  Exploratory Laparotomy, washout;  Surgeon: Tip Zhang MD;  Location: [facility] OR     TRANSPLANT LIVER  RECIPIENT  DONOR N/A 3/4/2017    Procedure: TRANSPLANT LIVER RECIPIENT  DONOR;  Surgeon: Jovan Tran MD;  Location: [facility] OR        MEDICATIONS:  PTA Meds  Prior to Admission medications    Medication Sig Last Dose Taking? Auth Provider   gabapentin (NEURONTIN) 300 MG capsule Take 1 capsule (300 mg) by mouth 3 times daily   Toñito Camejo MD   amLODIPine (NORVASC) 5 MG tablet Take 1 tablet (5 mg) by mouth daily   Toñito Camejo MD   tacrolimus (PROGRAF - GENERIC EQUIVALENT) 1 MG capsule Take 4capsules (4mg) in the morning and 3 capsules (3 mg) in the evening. Take every 12 hours.   Toñito Camejo MD   mycophenolate (CELLCEPT - GENERIC EQUIVALENT) 250 MG capsule Take 1 capsule (250 mg) by mouth every 12 hours   Toñito Camejo MD   sulfamethoxazole-trimethoprim (BACTRIM/SEPTRA) 400-80 MG per tablet Take 1 tablet on Monday and take 1 tablet on Thursday   Toñito Camejo MD   fludrocortisone (FLORINEF) 0.1 MG tablet Take 1 tablet (0.1 mg) by mouth daily For elevated potassium   Toñito Camejo MD   oxyCODONE (ROXICODONE) 10 MG IR tablet Take 1 tablet (10 mg) by mouth daily as needed for moderate to severe pain   Shay Kirkpatrick MD   clotrimazole (MYCELEX) 10 MG LOZG lozenge Place 1 lozenge (1 Beatrice) inside cheek 4 times daily   Toñito Camejo MD   Linagliptin (TRADJENTA PO) Take 5 mg by mouth   Reported, Patient   tadalafil (CIALIS) 5 MG tablet Take 1 tablet (5 mg) by mouth daily Never use with nitroglycerin, terazosin or doxazosin.   Toñito Rivas MD   tadalafil (CIALIS) 5 MG tablet Take 1 tablet (5 mg) by mouth daily Never use with nitroglycerin, terazosin or doxazosin.   Toñito Rivas MD   cyclobenzaprine (FLEXERIL) 10 MG tablet Take 1 tablet (10 mg) by mouth 3 times daily as needed for muscle spasms   Toñito Camejo MD   omeprazole (PRILOSEC) 20 MG CR capsule Take 1 capsule (20 mg) by mouth 2 times daily (before meals)   Toñito Camejo MD   magnesium oxide (MAG-OX) 400 MG tablet Take 1  tablet (400 mg) by mouth 2 times daily   Adonay Cross MD   lidocaine (LIDODERM) 5 % Patch Place 1 patch onto the skin every 24 hours  Patient not taking: Reported on 6/7/2017   Dora Elizabeth NP   LACTOBACILLUS EXTRA STRENGTH CAPS Take 1 capsule by mouth 2 times daily   Dora Elizabeth NP   prochlorperazine (COMPAZINE) 5 MG tablet Take 1-2 tablets (5-10 mg) by mouth every 6 hours as needed for nausea or vomiting  Patient not taking: Reported on 6/7/2017   Dora Elizabeth NP   oxyCODONE (ROXICODONE) 5 MG IR tablet Take 1-2 tablets (5-10 mg) by mouth every 4 hours as needed for moderate to severe pain   Ada Driver PA-C   aspirin 325 MG tablet 1 tablet (325 mg) by Oral or Feeding Tube route daily   Ada Driver PA-C   insulin aspart (NOVOLOG PEN) 100 UNIT/ML injection DOSE:  1 units per 8 grams of carbohydrate. With meals and snacks. Only chart total amount of units given.  Do not give if pre-prandial glucose is less than 60 mg/dL.   Ada Driver PA-C   insulin glargine (LANTUS) 100 UNIT/ML injection Inject 45 Units Subcutaneous every 24 hours  Patient taking differently: Inject 50 Units Subcutaneous every 24 hours    Ada Driver PA-C   valGANciclovir (VALCYTE) 450 MG tablet Take 1 tablet (450 mg) by mouth daily   Ada Driver PA-C   multivitamin, therapeutic with minerals (THERA-VIT-M) TABS tablet Take 1 tablet by mouth daily   Ada Driver PA-C   ondansetron (ZOFRAN-ODT) 4 MG ODT tab Take 1 tablet (4 mg) by mouth every 8 hours as needed for nausea   Ada Driver PA-C   glucagon 1 MG SOLR injection Inject 1 mg Subcutaneous every 15 minutes as needed for low blood sugar (May repeat x 1 only)   Godwin Willson MD      Current Meds    filgrastim (NEUPOGEN/GRANIX) intravenous  5 mcg/kg Intravenous Daily at 8 pm     piperacillin-tazobactam  2.25 g Intravenous Q6H     lidocaine (PF)         fludrocortisone  0.1 mg Oral Daily     micafungin  100 mg  Intravenous Q24H     sodium chloride (PF)  3 mL Intracatheter Q8H     ranitidine  50 mg Intravenous Q8H     metroNIDAZOLE  500 mg Intravenous Q8H     tacrolimus  3 mg Oral QPM     tacrolimus  4 mg Oral QAM     albumin human  12.5 g Intravenous Q6H     vancomycin place oreilly - receiving intermittent dosing  1 each Does not apply See Admin Instructions     Infusion Meds    NaCl 85 mL/hr at 07/05/17 0507     no pre procedure antibiotic needed       K+ cocktail (ADULT NON-DIABETIC) infusion 50 mL/hr (07/05/17 0802)     IV fluid REPLACEMENT ONLY       insulin (regular) 4 Units/hr (07/05/17 1204)     parenteral nutrition - ADULT compounded formula       fentaNYL 100 mcg/hr (07/05/17 1000)     propofol (DIPRIVAN) infusion 25 mcg/kg/min (07/05/17 1000)     norepinephrine 0.1 mcg/kg/min (07/05/17 1100)     IV fluid REPLACEMENT ONLY       parenteral nutrition - ADULT compounded formula 65 mL/hr at 07/04/17 2033       ALLERGIES:    Allergies   Allergen Reactions     Erythromycin GI Disturbance     Vioxx      Nausea, vomiting       REVIEW OF SYSTEMS:  A 10 point review of systems was negative except as noted above.  Patient intubated and sedated.     SOCIAL HISTORY:   Social History     Social History     Marital status:      Spouse name: N/A     Number of children: 1     Years of education: N/A     Occupational History            Social History Main Topics     Smoking status: Former Smoker     Smokeless tobacco: Former User     Types: Chew     Quit date: 10/8/2015      Comment: Has used chewing tobacco since age 15 , chewed for 20yrs      Alcohol use No      Comment: last drink about a year ago (quit 2001)     Drug use: No     Sexual activity: Yes     Partners: Female     Other Topics Concern     Not on file     Social History Narrative    uSED TO BE      Back in school now         Reviewed with patient  Patient intubated and sedated unable to obtain,   FAMILY MEDICAL HISTORY:    Family History   Problem Relation Age of Onset     DIABETES Father      Hypertension Father      Substance Abuse Father      Arthritis Mother      CANCER Mother      Thyroid     Thyroid Disease Mother      Other Cancer Mother      Colon Cancer No family hx of      Hyperlipidemia No family hx of      Coronary Artery Disease No family hx of      CEREBROVASCULAR DISEASE No family hx of      Breast Cancer No family hx of      Prostate Cancer No family hx of      Reviewed with patient obtained from chart, patient intubated and sedated.   PHYSICAL EXAM:   Temp  Av.7  F (37.1  C)  Min: 97.8  F (36.6  C)  Max: 101  F (38.3  C)  Arterial Line MAP (mmHg)  Av mmHg  Min: 61 mmHg  Max: 114 mmHg  Arterial Line BP  Min: 73/70  Max: 134/70      Pulse  Av.7  Min: 84  Max: 94 Resp  Av.2  Min: 12  Max: 42  SpO2  Av.8 %  Min: 86 %  Max: 100 %  FiO2 (%)  Av.3 %  Min: 50 %  Max: 60 %    CVP (mmHg): 7 mmHg  /61  Temp 98.1  F (36.7  C)  Resp 21  Ht 1.829 m (6')  Wt 94.2 kg (207 lb 11.2 oz)  SpO2 100%  BMI 28.17 kg/m2   Date 17 0700 - 17 0659   Shift 9936-2341 4536-9225 4778-4922 24 Hour Total   I  N  T  A  K  E   I.V. 960.35   960.35    IV Piggyback 1000   1000       325    Colloid 250   250    Shift Total  (mL/kg) 2535.35  (26.91)   2535.35  (26.91)   O  U  T  P  U  T   Urine 197   197    Emesis/NG output 100   100    Drains 300   300    Shift Total  (mL/kg) 597  (6.34)   597  (6.34)   Weight (kg) 94.21 94.21 94.21 94.21        Admit Weight: 94.2 kg (207 lb 11.2 oz) (SCALE 2)     GENERAL APPEARANCE: intubated unresponsive sedated, n  EYES:no scleral icterus, pupils equal  HENT: NC/AT,  mouth  without ulcers or lesions  Lymphatics: no cervical or supraclavicular LAD  Pulmonary: lungs clear to auscultation with equal breath sounds bilaterally, no clubbing  CV: regular rhythm, fast rate, no rub   - JVP flat   - Edema none   GI: soft, scar of exlap, no HSM   MS: no evidence of  inflammation in joints, no muscle tenderness  : Crowe, no scrotal or labial edema  SKIN: no rash, warm, dry, no cyanosis,   NEURO: mentation intact and speech normal    LABS:   CMP  Recent Labs  Lab 07/05/17  1043 07/05/17  0859 07/05/17  0724 07/05/17  0346 07/04/17 2002 07/04/17  1402  07/04/17  0735 07/03/17  1033    137  --  140 140 143  < > 146* 136   POTASSIUM 6.3* 6.0* 5.6* 6.6* 5.7* 4.9  < > 3.9 5.4*   CHLORIDE 113*  --   --  114* 113* 116*  --  121* 108   CO2 19*  --   --  15* 16* 18*  --  13* 23   ANIONGAP 9  --   --  12 10 9  --  12 6   * 316*  --  238* 169* 168*  < > 187* 212*   BUN 53*  --   --  47* 43* 38*  --  30 32*   CR 2.69*  --   --  2.49* 2.34* 2.16*  --  1.56* 1.12   GFRESTIMATED 25*  --   --  27* 29* 32*  --  47* 69   GFRESTBLACK 30*  --   --  33* 35* 39*  --  57* 83   JACOB 6.9*  --   --  7.2* 7.2* 6.9*  --  5.6* 8.3*   MAG  --   --   --  2.1  --  1.7  --  1.4* 1.9   PHOS  --   --   --  4.8*  --  3.4  --  1.7* 3.7   PROTTOTAL 4.5*  --   --  4.4* 4.5* 4.2*  --  3.8* 6.2*   ALBUMIN 2.4*  --   --  2.2* 2.5* 2.3*  --  1.9* 3.4   BILITOTAL 0.6  --   --  0.7 1.0 0.5  --  0.3 0.5   ALKPHOS 37*  --   --  38* 42 40  --  44 104   AST 30  --   --  42 38 32  --  17 34   ALT 19  --   --  18 18 17  --  12 26   < > = values in this interval not displayed.  CBC  Recent Labs  Lab 07/05/17  1043 07/05/17  0859 07/05/17 0346 07/04/17 2002 07/04/17  1402   HGB 9.3* 9.0* 9.9* 10.4* 9.8*   WBC 1.3*  --  1.2* 1.3* 0.9*   RBC 3.18*  --  3.47* 3.59* 3.36*   HCT 27.7*  --  29.6* 30.6* 28.8*   MCV 87  --  85 85 86   MCH 29.2  --  28.5 29.0 29.2   MCHC 33.6  --  33.4 34.0 34.0   RDW 15.2*  --  15.2* 15.0 14.9   PLT 54*  --  63* 74* 72*     INR  Recent Labs  Lab 07/05/17  1043 07/05/17  0346 07/04/17 2002 07/04/17  1402   INR 2.37* 2.16* 1.88* 1.78*   PTT  --   --  49* 48*     ABG  Recent Labs  Lab 07/05/17  0859 07/05/17 0346 07/04/17  2350 07/04/17 2002   PH 7.14* 7.27* 7.32* 7.33*   PCO2 51* 34* 30*  30*   PO2 57* 124* 169* 169*   HCO3 17* 16* 16* 16*   O2PER 70.0 50 50 50.0      URINE STUDIES  Recent Labs   Lab Test  06/06/17   1605  03/24/17   1315  03/16/17   1010  03/03/17   1405   COLOR  Yellow  Yellow  Sun  Dark Yellow   APPEARANCE  Slightly Cloudy  Clear  Slightly Cloudy  Slightly Cloudy   URINEGLC  50*  Negative  Negative  Negative   URINEBILI  Negative  Negative  Negative  Moderate   This is an unconfirmed screening test result. A positive result may be false.  *   URINEKETONE  Negative  Negative  Negative  Negative   SG  1.015  1.009  1.012  1.010   UBLD  Negative  Negative  Negative  Negative   URINEPH  5.0  5.5  5.0  5.0   PROTEIN  100*  Negative  Negative  Negative   NITRITE  Negative  Negative  Negative  Negative   LEUKEST  Negative  Negative  Negative  Negative   RBCU  2  <1  0  1   WBCU  1  1  5*  0     Recent Labs   Lab Test  06/14/17   1508  04/11/16   1345  02/08/16   1234  04/11/11   1201   UTPG  1.55*  0.41*  0.33*  <0.08     PTH  No lab results found.  IRON STUDIES  Recent Labs   Lab Test  02/08/16   1144   IRON  93   FEB  230*   IRONSAT  41       IMAGING:  I personally reviewed these imaging films.  A formal report from radiology will follow.     Tl Higginbotham MD     Attestation:  This patient has been seen and evaluated by me, Ninfa Hernández MD on 07/05/2017  .  Discussed with the fellow or resident and agree with the findings and plan in this note.     I have reviewed 07/05/2017 and yesterday Medications, Vital Signs, Labs and Imaging.    I personally discussed with primary team and went to see patient for second visit, UOP is still suboptimal, acidosis better with more ventilation.  Discussed further isotonic crystalloid volume expansion to increase distal sodium delivery and increase urinary potassium excretion.    Ninfa Hernández MD  [facility]  Department of Medicine  Division of Renal Disease and Hypertension  [phone]

## 2017-07-05 NOTE — PROGRESS NOTES
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     Graft Function: Good, T Bili 0.3 on admission, stable. Alk phos 38. INR on admission 1.87, elevated from 3/2017 (1.16), now reaching 2.16, started on TPN.  Given worsening abdominal exam, hypotension and increasing Lactate (4 > 8.7) was taken to OR for exploratory laparotomy; findings demonstrating typhlitis of the right colon, no obvious ischemic or nonviable bowel. Abdomen left open for interval reexploration and closure 7/5/2017 demonstrating improved inflammation in the right colon without evidence of ischemia. Lactate on admission 4.0 oriana to 8.7 despite aggressive fluid resuscitation, has since come back down to 2.3 following fluids and ex lap.   Immunosuppression Management:   Maintenance: CellCept 250 mg, Prograf dose recently decreased from 5 mg BID by Dr. Camejo. Fludricortisone 0.1 mg po daily started due to hyperkalemia.   Tac level on admission 9.9> 5.0 on 7/5, continue Prograf 4 mg AM, 3 mg PM dosing. Continue to Hold cellcept for leukopenia - patient had been instructed to hold prior to admission given neutropenia 6/27/17.  Tac level in process.   Cardiorespiratory: Intubated for ex lap, has since required pressor support now with findings of large right sided pleural effusion. Vent management per SICU team.   Hematology: Hgb 9.6 on admission, stable. No signs of GI bleeding. Repeat CBC with slight increase in WBC to 1.2, ANC 0.3 7/5. Neupogen started 7/4 given neutropenia and findings of typhlitis.   GI: CT from [place] demonstrating right sided colonic  inflammation, AXR on admission with dilated loop of bowel in central abdomen, felt likely to sigmoid. Initial impression consistent with Typhlitis however given worsening abdominal pain on examination, hypotension and increasing lactate levels, elected to proceed with exploratory laparotomy. Findings on reexploration demonstrating persistent inflammation of the right colon without evidence of ischemia. Continue NPO. Will obtain Liver US now.   Fluid/Electrolytes/Renal: MIVF: per SICU team, TPN started. Potassium elevated to 6.6 this AM, given Insulin, dextrose, lasix 80 mg IV and calcium, improved to 5.6. Fluids switched to NS. Creatinine 1.56 on admission no rising to 2.49. Likely intravascularly dry with third spacing given inflammatory response. Noted proteinuria in the past, Dr. Camejo felt likely component of underlying CKD given history of EJ. Crowe in place. Nephrology consult for likely dialysis.   Endocrine: DM II on SSI and insulin gtt.   Infectious disease: Tmax 99.7, BPs drifting down this morning /52-79, appropriately tachycardic in response to this. Started on Zosyn, Vancomycin, Flagyl, Micafungin on admission (7/4/2017), plan to continue given intraoperative findings. Neupogen ordered given typhlitis and neutropenia. CMV <137 now detectable in serum however unlikely current symptoms related to this, plan to repeat CMV 7/10. Holding Valcyte 450 mg given neutropenia. Continue to hold bactrim. Blood cultures obtained 7/4 currently pending. Abdominal fluid culture collected intraoperatively sent for Gram stain, fungal and bacterial culture. Serial lactates q 4 hours while in ICU.   Access: PIV x2, Central Line R IJ, R radial arterial line  Prophylaxis: GI, Viral, Fungal.   Disposition: SICU while requiring pressors.      Medical Decision Making: Medium  Admit 37926 (moderate level decision making)    PILLO/Fellow/Resident Provider: Ada Driver PA-C     Faculty: Tip Zhang  M.D.    __________________________________________________________________  Transplant History:.  3/4/2017 DD OLT with Dr. Tran (Liver), Postoperative day: 123     Interval History: History is obtained from EMR and nursing staff.  Overnight events: Low UOP overnight, Cr and K elevated this morning. Continues to require pressors, CXR now with large right sided pleural effusion.     ROS:   A 10-point review of systems was negative except as noted above.    Meds:    filgrastim (NEUPOGEN/GRANIX) intravenous  5 mcg/kg Intravenous Once     sodium chloride 0.9%  1,000 mL Intravenous Once     fludrocortisone  0.1 mg Oral Daily     micafungin  100 mg Intravenous Q24H     sodium chloride (PF)  3 mL Intracatheter Q8H     ranitidine  50 mg Intravenous Q8H     piperacillin-tazobactam  3.375 g Intravenous Q6H     metroNIDAZOLE  500 mg Intravenous Q8H     tacrolimus  3 mg Oral QPM     tacrolimus  4 mg Oral QAM     albumin human  12.5 g Intravenous Q6H     vancomycin place oreilly - receiving intermittent dosing  1 each Does not apply See Admin Instructions       Physical Exam:     Admit Weight: 94.2 kg (207 lb 11.2 oz) (SCALE 2)    Current vitals:   /55  Temp 97.8  F (36.6  C) (Axillary)  Resp (!) 38  Ht 1.829 m (6')  Wt 94.2 kg (207 lb 11.2 oz)  SpO2 99%  BMI 28.17 kg/m2         Vital sign ranges:    Temp:  [97.8  F (36.6  C)-101  F (38.3  C)] 97.8  F (36.6  C)  Heart Rate:  [] 101  Resp:  [32-38] 38  BP: ()/() 114/55  MAP:  [61 mmHg-114 mmHg] 88 mmHg  Arterial Line BP: ()/() 91/87  FiO2 (%):  [50 %-60 %] 60 %  SpO2:  [96 %-100 %] 99 %  Patient Vitals for the past 24 hrs:   BP Temp Temp src Heart Rate Resp SpO2   07/05/17 1015 114/55 - - 101 - 99 %   07/05/17 1000 111/75 97.8  F (36.6  C) Axillary - - -   07/05/17 0958 - - - - - 99 %   07/05/17 0800 114/64 - - 105 - 97 %   07/05/17 0745 110/60 - - 104 - -   07/05/17 0730 116/67 - - 103 - -   07/05/17 0715 100/63 - - 107 - -    07/05/17 0700 96/59 - - 103 - 96 %   07/05/17 0500 118/69 - - 104 - 99 %   07/05/17 0400 101/65 98.3  F (36.8  C) Axillary 102 - 100 %   07/05/17 0000 - 100  F (37.8  C) Axillary - - -   07/04/17 2000 - 101  F (38.3  C) Axillary - - -   07/04/17 1945 - - - 96 - 99 %   07/04/17 1930 - - - 95 - 98 %   07/04/17 1915 - - - 93 - 100 %   07/04/17 1900 - - - 93 - 99 %   07/04/17 1845 - - - 92 - -   07/04/17 1830 - - - 93 - 98 %   07/04/17 1815 - - - 92 - 98 %   07/04/17 1800 - - - 93 - 99 %   07/04/17 1745 - - - 93 - 98 %   07/04/17 1730 - - - 92 - -   07/04/17 1715 - - - 92 - -   07/04/17 1700 - - - 93 - 99 %   07/04/17 1600 - 98.6  F (37  C) Axillary 95 - 100 %   07/04/17 1515 - - - 91 - -   07/04/17 1500 - - - 92 - 97 %   07/04/17 1445 - - - 89 - -   07/04/17 1430 - - - 89 - -   07/04/17 1415 - - - 86 - -   07/04/17 1400 - 98  F (36.7  C) Axillary 86 - -   07/04/17 1345 - - - 85 - -   07/04/17 1300 (!) 122/106 - - 82 - -   07/04/17 1045 94/72 - - 106 (!) 38 -   07/04/17 1043 (!) 73/49 - - 105 (!) 36 97 %   07/04/17 1030 - - - 100 (!) 32 -     General Appearance: sedated   Skin: normal, warm, dry  Heart: ST, normal S1 and S2  Lungs: On ventilator  Abdomen: The abdomen is soft, midline incision is c/d/i and covered. Incision from prior Liver transplant is noted and well healed.   : vazquez is present, clear light urine in bag. The genitalia is not edematous. Urine has no gross hematuria.   Extremities: edema: absent.  Neurologic: awake and alert. Tremor absent.    Data:   CMP    Recent Labs  Lab 07/05/17  0859 07/05/17  0724 07/05/17  0346 07/04/17 2002 07/04/17  1402     --  140 140 143   POTASSIUM 6.0* 5.6* 6.6* 5.7* 4.9   CHLORIDE  --   --  114* 113* 116*   CO2  --   --  15* 16* 18*   *  --  238* 169* 168*   BUN  --   --  47* 43* 38*   CR  --   --  2.49* 2.34* 2.16*   GFRESTIMATED  --   --  27* 29* 32*   GFRESTBLACK  --   --  33* 35* 39*   JACOB  --   --  7.2* 7.2* 6.9*   ICAW 4.4  --   --  4.3* 4.4   MAG   --   --  2.1  --  1.7   PHOS  --   --  4.8*  --  3.4   ALBUMIN  --   --  2.2* 2.5* 2.3*   BILITOTAL  --   --  0.7 1.0 0.5   ALKPHOS  --   --  38* 42 40   AST  --   --  42 38 32   ALT  --   --  18 18 17     CBC    Recent Labs  Lab 07/05/17  0859 07/05/17  0346 07/04/17 2002   HGB 9.0* 9.9* 10.4*   WBC  --  1.2* 1.3*   PLT  --  63* 74*     COAGS    Recent Labs  Lab 07/05/17  0346 07/04/17 2002 07/04/17  1402   INR 2.16* 1.88* 1.78*   PTT  --  49* 48*      Urinalysis  Recent Labs   Lab Test  06/14/17   1508  06/06/17   1605  03/24/17   1315   04/11/16   1345   COLOR   --   Yellow  Yellow   < >  Yellow   APPEARANCE   --   Slightly Cloudy  Clear   < >  Clear   URINEGLC   --   50*  Negative   < >  30*   URINEBILI   --   Negative  Negative   < >  Negative   URINEKETONE   --   Negative  Negative   < >  Negative   SG   --   1.015  1.009   < >  1.016   UBLD   --   Negative  Negative   < >  Small*   URINEPH   --   5.0  5.5   < >  5.0   PROTEIN   --   100*  Negative   < >  30*   NITRITE   --   Negative  Negative   < >  Negative   LEUKEST   --   Negative  Negative   < >  Negative   RBCU   --   2  <1   < >  1   WBCU   --   1  1   < >  1   UTPG  1.55*   --    --    --   0.41*    < > = values in this interval not displayed.       Cultures:   Blood Cultures x2 7/4/2017 Pending     Abdominal Fluid Culture 7/4/2017: Pending     Abdominal Fluid Culture 7/5/17: Pending    CT scan:    7/4/2017 CONCLUSION:   1. Right colonic wall thickening suggesting colitis. Follow-up is necessary to confirm resolution in order to exclude colonic mass.  2. Transverse colon is not well distended reducing evaluation for wall thickening.  3. Small amount of ascites.  4. Splenomegaly.  5. Prominent portal and splenic veins. If confirmation of vascular patency is indicated consider follow-up ultrasound of the liver with Doppler.  6. Small right pleural effusion.  7. Stranding in the subcutaneous fat of the ventral pelvis.     Abdominal XR 7/4/2017:   1.  No evidence of pneumoperitoneum.  2. Dilated loop of bowel in the central abdomen, likely sigmoid.    XR Chest Portable 1 View 7/4/17:  1. Endotracheal tube tip projects 5.3 cm from the chacorta.  2. Increased bilateral pleural effusions, right greater than left.  3. Unchanged bibasilar patchy opacities.

## 2017-07-05 NOTE — PHARMACY-VANCOMYCIN DOSING SERVICE
Pharmacy Empiric Dose Change Per Policy  Original Dose Ordered: Vancomycin 1750 mg IV Q12H  Dose Changed To: Intermittent dosing  This dose change was based on the pharmacist's assessment of this patient's age, weight, concurrent drug therapy, treatment goals, whether patient's creatinine clearance adequately indicates renal function (factoring in age, muscle mass, fluid and clinical status), and, if applicable, prior pharmacokinetic data.    Estimated Creatinine Clearance: 44 mL/min (based on Cr of 2.34).     Will continue to follow and modify dosage according to levels, organ function and clinical condition    Diane Dominguez PharmD  PGY-2 Infectious Diseases Pharmacy Resident  Office Ph: [phone]  Pager: [phone]

## 2017-07-05 NOTE — PROCEDURES
SICU BEDSIDE PROCEDURE   NAME:  Camacho Bhagat   MRN:  [id number]   :  1965     Diagnosis:  Respiratory failure  Procedure: Flexible bronchoscopy   Surgeon: Bety Page, Resident  Natanael, Fellow  Specimen: BAL  Premedication: Pt intubated and sedated prior to procedure  Procedure Meds:  1% topical lidocaine solution at the chacorta. Already sedated  Procedure: A timeout was called to review the case and patient information. The patient was positioned supine. The bronchoscope was passed into the distal trachea via ET tube without difficulty.  Bilateral tracheobronchial trees were inspected closely to the level of the subsegmental bronchi.  Secretions were found to be thick.  These were lavaged and evacuated without difficulty. BAL sample was sent. The procedure was completed and the patient tolerated the procedure well and without complications.      Findings: Thick mucus plug at level of chacorta, evacuated per scope suction.         Dr. Albert was available for assistance throughout the entire procedure.      Arden Page MD  PGY-2 General Surgery

## 2017-07-05 NOTE — ANESTHESIA POSTPROCEDURE EVALUATION
Patient: Camacho Bhagat    Procedure(s):  Exploratory Laparotomy, Washout with closure. - Wound Class: II-Clean Contaminated    Diagnosis:Typhlitis  Diagnosis Additional Information: No value filed.    Anesthesia Type:  General, ETT    Note:  Anesthesia Post Evaluation    Patient location during evaluation: ICU  Patient participation: Unable to evaluate secondary to administered sedation  Level of consciousness: sleepy but conscious  Pain management: adequate  Airway patency: patent  Cardiovascular status: acceptable  Respiratory status: acceptable  Hydration status: euvolemic  PONV: none     Anesthetic complications: None          Last vitals:  Vitals:    07/05/17 0400 07/05/17 0500 07/05/17 0958   BP: 101/65 118/69    Resp:      Temp: 36.8  C (98.3  F)     SpO2: 100% 99% 99%         Electronically Signed By: Will Daugherty MD  July 5, 2017  10:10 AM

## 2017-07-05 NOTE — PLAN OF CARE
Problem: Restraint for Non-Violent/Non-Self-Destructive Behavior  Goal: Prevent/Manage Potential Problems  Maintain safety of patient and others during period of restraint.  Promote psychological and physical wellbeing.  Prevent injury to skin and involved body parts.  Promote nutrition, hydration, and elimination.   Outcome: No Change  Bilateral soft wrist restraints maintained for patient safety related to ET tube

## 2017-07-05 NOTE — PLAN OF CARE
Problem: Restraint for Non-Violent/Non-Self-Destructive Behavior  Goal: Prevent/Manage Potential Problems  Maintain safety of patient and others during period of restraint.  Promote psychological and physical wellbeing.  Prevent injury to skin and involved body parts.  Promote nutrition, hydration, and elimination.   Outcome: No Change  Patient remains retrained for safety to prevent pulling at breathing tube and lines. Will continue to monitor.

## 2017-07-05 NOTE — PLAN OF CARE
Problem: Goal Outcome Summary  Goal: Goal Outcome Summary  Outcome: No Change  D: patient with history of liver transplant in march 2017 admitted yesterday from OSH  With fevers, abdominal pain and elevated lactic acid. Taken for washout last night and back to OR this morning where abdomen was closed. Continues in ICU, vented, sedated.      I/A:     Neuro: Sedation holiday done this afternoon. Patient able to move all extremities, opening eyes and grimacing to pain. Pupils equal and reactive. No command following noted. Sedated with profofol and fentanyl gtts.   Cardiac: Sinus tachycardia from 95-110s. BPs with MAP goal of 65 maintained with norepi gtt and fluid boluses. BPS from 90-110s/40-50s. Art line replaced.   Respiratory: VEnted, bronch done today, cultures sent. Suctioned for small amounts of thick green secretions. Lungs coarse to diminished. PH continues at 7.17 and CO2 elevated this afternoon to 49. Vent settings adjusted to RR of 22, and TV of 500. FiO2 at 50%. PH improved to 7.28 and CO2 at 37  GI: NG in place to LIS, okay to give tacro down NG. NO BM, hypoactive bowel sounds. Abdomen closed in OR today. Soft, non distended.   : Crowe in place, low UP from 20-50/hr depending on fluid boluses. Bolused for 3 L total NS this shift and 250 of albumin in addition to scheduled doses. Nephrology consult, no dialysis for now. Insulin gtt for hyperkalemia and hyperglycemia. K continues at 6.2, MD aware, no plan for lasix for now.   Hematology: Platelets low at 36 this afternoon, MD notified and ordered 1 unit. Transplant team also at bedside and ordered additional heme labs.   ID:T max 99.3. Blood cultures and BAL sent.         P: Continue to monitor. Notify MD of any acute events. Labs q 4 hr. Wife at beside and was updated on plan of care this afternoon.

## 2017-07-05 NOTE — PHARMACY-CONSULT NOTE
Patient is being treated for hyperkalemia. A pharmacy consult was initiated to review the patient's medication list for possible causes of hyperkalemia.    The following medications for this patient may cause or exacerbate hyperkalemia:      Lactated ringers.  Patient received multiple boluses overnight and was on an infusion which has now been changed to NS.  Potassium replacement protocol but this has not been used.  Tacrolimus    Continue current medication regimen.

## 2017-07-05 NOTE — PROCEDURES
Brief Operative Report  7/5/2017    Pre-operative Diagnosis: Sepsis  Post-operative Diagnosis: Sepsis  Procedure: Arterial line placement, left axillary  Staff: Bety  Resident: Milagros Santoyo  EBL: 5mL  Specimen: none  Complication: none  Tubes/Drains: new left axillary art-line  Disposition: SICU    Arden Page MD  PGY-2 General Surgery

## 2017-07-05 NOTE — PROGRESS NOTES
SURGICAL ICU PROGRESS NOTE  July 5, 2017      CO-MORBIDITIES:   Neutropenic colitis (H)  (primary encounter diagnosis)  Liver transplant recipient 2/2 [facility]  Type II DM  RONNIE on BiPAP    ASSESSMENT: Camacho Bhagat is a 51 year old male with PMHx significant for liver transplant March 2017 2/2 to [facility] from [place] admitted on 7/4 for abdominal pain and fever found to have neutropenic colitis s/p ex lap on 7/4 and 7/5.     Events/Changes 7/5:  - Plans to go back to the OR this AM--abdomen closed with no bowel resection   - Recheck CBC, CMP, lactic acid, coags, VBG post procedure.  - Restart Neupogen today   - Follow cultures, discuss D/C Flagyl with transplant  - Will place left axillary arterial line and remove right radial art line  - Will repeat ABG off new art line  - Nephrology consult for question of dialysis given new EJ and hyperkalemia   - Bronchoscopy today  - Repeat Chest x-ray  - 1 L bolus with NS  - Start IV insulin  - Hold off starting chemical DVT prophylaxis     PLAN:  Neuro/ pain/ sedation:  - fentanyl infusion  mcg/hr  - propofol sedation infusion 5-75 mcg/kg/hr  - Dilaudid IV PRN  - Decrease propofol as possible     CV:   #Hypotension secondary to septic shock   - Continuous cardiac monitoring.   - Pressors if needed, MAP>65  - Requiring 0.12mcg/hr of norepinephrine  - No peaked T waves or NH prolongation noted on bedside cardiac monitor  - Distal extremities cold and midly dusky  - Try to wean pressors  - replace arterial line    - lactate stable at 2.2, repeat Q4    Pulm:   #Bilateral pleural effusions (R>L) with patchy bibasilar opacities on chest x-ray  - Vent mode AC, RR 12, , PEEP 5. Fi02 50  - ABG show metabolic acidosis with respiratory compensation.  - Likely atelectasis due to mucous plugging, will do bronchoscopy today  - Will repeat chest x-ray today      FEN/ GI:   # Neutropenic Colitis in setting of immunosupression  # [facility] s/p  liver transplantation 3/2017  - OR on 7/4 for ex  lap, no bowel resection and abdomen left open with wound vac in place  - OR on 7/5 for ex lap, abdomen closed, no bowel resection   - NPO except medications  - TPN started via central line  - Hepatic profile looks reassuring, repeat per transplant  - Liver US per transplant  - Return to OR for relook per transplant today  - Tacrolimus through NG per transplant      Renal/Fluids:  #Acute kidney injury  #Hyperkalemia  #Metabolic acidosis with respiratory compensation  - UOP is borderline, 375 yesterday, 238 today  - mIVf: NS  @ 100cc/hr  - Continue albumin 12.5 mg q6hrs  - Requires replacement of abdominal fluid output, will give 1 L NS now  - For hyperkalemia, patient received 80 IV lasix, calcium gluconate, insulin and D50  - started on potassium cocktail pre-op   - Will consult nephrology for consideration of dialysis.  - ICU electrolyte replacement protocol  - Crowe catheter to stay in place for strict intake and output monitoring  - recheck K every 4 hours, recheck now       Endocrine:   # Type II DM  -  Start insulin gtt due to hyperglycemia this AM  - Insulin also present in potassium cocktail       ID/ Abx:  #Neutropenia in setting of immunosuppression   # Neutropenic Colitis  - Vancomycin, zosyn, flagyl, and micafungin per transplant.   - Discuss D/Cing flagyl with transplant   - Outpatient valcyte prophylaxis was discontinued   - Abdominal fluid cultures 7/4 with moderate WBC and PMNs, cultures pending  - Blood cultures 7/4 pending  - continue Neupogen  - CBC with diff daily for a goal ANC >1.5      Heme:   # Pancytopenia in setting of immunosuppression   - Hemoglobin stable at 9.9. No sign of active bleeding  - Q8H CMP, coags, BMP, calcium  - Neupogen daily      Prophylaxis:    - DVT: SCD. Wait to start chemical ppx.  - GI: Ranitidine IV.     MSK:  - PT/OT held until surgery complete      Lines/Drains:  - ETT, IJ TLCVC, Right radial a-line, NG tube, Crowe  - Non-functioning art line, will place axillary  left        Patient seen, findings and plan discussed with surgical ICU staff Dr. Albert.  Critical Care time: 50 mins     Zainab Eduardo PA-C  Pager: [phone]  ====================================    SUBJECTIVE:   Course reviewed. No acute events overnight. Patient's potassium continued to uptrend overnight with the highest level being 6.6. The patient received lasix, calcium gluconate, insulin and D50 throughout the evening. Levophed levels have remained somewhat steady overnight. LA remains elevated but stable.     OBJECTIVE:   1. VITAL SIGNS:   Temp:  [97.8  F (36.6  C)-101  F (38.3  C)] 98.1  F (36.7  C)  Heart Rate:  [] 101  Resp:  [12-21] 21  BP: ()/(54-75) 109/61  MAP:  [61 mmHg-114 mmHg] 88 mmHg  Arterial Line BP: ()/() 91/87  FiO2 (%):  [50 %-60 %] 60 %  SpO2:  [93 %-100 %] 100 %  Ventilation Mode: CMV/AC  FiO2 (%): 60 %  Rate Set (breaths/minute): 12 breaths/min  Tidal Volume Set (mL): 450 mL  PEEP (cm H2O): 5 cmH2O  Oxygen Concentration (%): 60 %  Resp: 21    2. INTAKE/ OUTPUT:   I/O last 3 completed shifts:  In: 23147.7 [I.V.:4406.45; IV Piggyback:7700]  Out: 4585 [Urine:685; Emesis/NG output:800; Drains:2600; Other:500]    3. PHYSICAL EXAMINATION:     GEN:  male, resting in bed, sedated on exam   EYES: PERRL, Anicteric sclera.   HEENT:  Normocephalic, atraumatic, trachea midline, ETT secure  CV: RRR, no murmurs, rubs or gallops   PULM/CHEST: Diminished breath sounds on the right side, no wheezes, rales or rhonchi  GI: midline incision with bandage which is clean, dry and intact, hypoactive bowel sounds  : vazquez catheter in place, urine dark yellow and clear  EXTREMITIES: somewhat dusky extremities and cool to the touch, non edematous   NEURO: sedated on exam   SKIN: cool extremities   Imaging personally reviewed: CXR with right sided pleural effusion     4. INVESTIGATIONS:   Arterial Blood Gases     Recent Labs  Lab 07/05/17  0859 07/05/17  0346 07/04/17  0161  07/04/17 2002   PH 7.14* 7.27* 7.32* 7.33*   PCO2 51* 34* 30* 30*   PO2 57* 124* 169* 169*   HCO3 17* 16* 16* 16*     Complete Blood Count     Recent Labs  Lab 07/05/17  1043 07/05/17  0859 07/05/17  0346 07/04/17 2002 07/04/17  1402   WBC 1.3*  --  1.2* 1.3* 0.9*   HGB 9.3* 9.0* 9.9* 10.4* 9.8*   PLT 54*  --  63* 74* 72*     Basic Metabolic Panel    Recent Labs  Lab 07/05/17  1043 07/05/17  0859 07/05/17 0724 07/05/17 0346 07/04/17 2002 07/04/17  1402    137  --  140 140 143   POTASSIUM 6.3* 6.0* 5.6* 6.6* 5.7* 4.9   CHLORIDE 113*  --   --  114* 113* 116*   CO2 19*  --   --  15* 16* 18*   BUN 53*  --   --  47* 43* 38*   CR 2.69*  --   --  2.49* 2.34* 2.16*   * 316*  --  238* 169* 168*     Liver Function Tests    Recent Labs  Lab 07/05/17  1043 07/05/17 0346 07/04/17 2002 07/04/17  1402   AST 30 42 38 32   ALT 19 18 18 17   ALKPHOS 37* 38* 42 40   BILITOTAL 0.6 0.7 1.0 0.5   ALBUMIN 2.4* 2.2* 2.5* 2.3*   INR 2.37* 2.16* 1.88* 1.78*     Pancreatic Enzymes  No lab results found in last 7 days.  Coagulation Profile    Recent Labs  Lab 07/05/17  1043 07/05/17 0346 07/04/17 2002 07/04/17  1402   INR 2.37* 2.16* 1.88* 1.78*   PTT  --   --  49* 48*         5. RADIOLOGY:   Recent Results (from the past 24 hour(s))   XR Chest Port 1 View    Narrative    EXAM: XR CHEST PORT 1 VW  7/4/2017 2:13 PM      HISTORY: endotracheal tube positioning    COMPARISON: Chest x-ray 3/6/2017    FINDINGS: Portable AP view of the chest obtained. The endotracheal  tube tip projects 7 cm from the chacorta. Enteric tube courses beyond  the margins of film. Right IJ central line tip projects over the mid  SVC.    The trachea is midline. The cardiac silhouette is within normal  limits. Low lung volumes. Hazy opacification of the costophrenic  angles bilaterally. No pneumothorax. Mild left streaky opacities.      Impression    IMPRESSION:   1. Endotracheal tube tip projects 7 cm from the chacorta.  2. Bilateral pleural effusions  with mild overlying opacities, most  likely atelectasis.    I have personally reviewed the examination and initial interpretation  and I agree with the findings.    ROB STARK MD   XR Chest Port 1 View    Narrative    EXAM: XR CHEST PORT 1 VW  7/4/2017 6:48 PM      HISTORY: ET tube placement after 3cm advance    COMPARISON: Chest x-ray from earlier today 7/4/2017 2:06 PM    FINDINGS: Portable AP views of the chest obtained. Endotracheal tube  tip projects 5.3 cm from the chacorta. Right IJ central line tip  projects over the mid SVC. Enteric tube courses beyond the margins of  the film. The trachea is midline. The cardiac silhouette is within  normal limits. Increased hazy opacification of the right hemithorax,  suggestive of underlying pleural fluid. Increased left pleural  effusion. Unchanged patchy bibasilar opacities.      Impression    IMPRESSION:   1. Endotracheal tube tip projects 5.3 cm from the chacorta.  2. Increased bilateral pleural effusions, right greater than left.  3. Unchanged bibasilar patchy opacities.    I have personally reviewed the examination and initial interpretation  and I agree with the findings.    ROB STARK MD       =========================================

## 2017-07-05 NOTE — ANESTHESIA PREPROCEDURE EVALUATION
Anesthesia Evaluation    ROS/Med Hx   Unable to perform ROS.    Cardiovascular Findings   (+) hypertension,     Neuro Findings - negative ROS    Pulmonary Findings - negative ROS    HENT Findings - negative HENT ROS    Skin Findings - negative skin ROS      GI/Hepatic/Renal Findings   (+) GERD, liver disease and renal disease    GERD is well controlled    Renal: CRI    Endocrine/Metabolic Findings   (+) diabetes    Diabetes  Type: type 1  Control: well controlled  Insulin: using insulin      Genetic/Syndrome Findings - negative genetics/syndromes ROS  Comments: Unable to perform ROS    Hematology/Oncology Findings - negative hematology/oncology ROS    Additional Notes  S/P Liver transplant for [place] cirrhosis on immunosuppressant medications, come in for acute abdominal pain secondary to ischemic colitis?  Patient is intubated, on minimal pressors NE, Vasopressin was D/C ed yesterday with lines in place comes for an exploratory laparotomy.  K was 6.6 he was given Calcium Gluconate to bring the K down,  We will treat hyperkalemia with an Insulin drip, Calcium and Bicarbonate, Base excess is negative 10      Physical Exam  Normal systems: cardiovascular, pulmonary and dental    Airway   Mallampati: I  TM distance: >3 FB  Neck ROM: full    Dental     Cardiovascular   Rhythm and rate: regular and normal      Pulmonary    breath sounds clear to auscultation          Anesthesia Plan      History & Physical Review  History and physical reviewed and following examination; no interval change.    ASA Status:  4 emergent.    NPO Status:  > 8 hours    Plan for General and ETT with Intravenous and Propofol induction. Maintenance will be TIVA.    PONV prophylaxis:  Ondansetron (or other 5HT-3)       Postoperative Care  Postoperative pain management:  IV analgesics.      Consents  Anesthetic plan, risks, benefits and alternatives discussed with:  Patient..

## 2017-07-05 NOTE — PLAN OF CARE
Problem: Goal Outcome Summary  Goal: Goal Outcome Summary  Outcome: Declining  D/I/A: POD Day 1 of exp lap, and wash out, plan for repeat wash out today. \     Neuro: Patient not following commands, moves all extremeties. Pupils equal and reactive. Sedated on 35ml/hr of propofol. Restraints in place for safety and avoiding line pulling.   CV: Sinus Tach. BP stable with levo on currently at .12. Goal to keep MAP above 65. Patient potassium critically high after continuous rise. SICU resident called multiple times for continual rise of lab value. Resident switched MIV from LR to NS, at 0600. Then mentioned the patients telemetry looked fine and did not want to just treat a number. No EKG was ordered.   Pulm: CMV 50%, RR 12, PEEP 5, . Patient over breathing in the 20-30's. SICU resident made aware, not concerned. LS clear/diminished, scant amount ET tube.   GI: no BM, Hypoactive bowel sounds. TPN at 65ml/hr. No lipids. MIV at 85 to equal 150ml/hr.   : Crowe in place. Lower urine output, team made aware. Scheduled albumin, with LR blous'. Mentioned to sicu resident that K+ was on the rise and did not want to change MIV or blouses from LR to NS until early this AM. Calcium gluconate then given at 0700, with insulin and frequent BS checks.   Skin: abthera in place with suction at 125. Put out 1,700 my shift, team made aware. Not concerned.      Sent down to OR for another exploratory lap and wash out at 0800 with anesthesia.   Potassium recheck was 5.6 before going down to OR. Also, 80mg of lasix given. Tacrolimus given prior to surgery as well.      P: Continue to monitor plan of care, notify MD of any changes.

## 2017-07-05 NOTE — OP NOTE
Transplant Surgery  Operative Note    Preop Dx:  Acute abdomen possible gangrenous bowel  Postop Dx: typhlitis  Procedure: Exploratory laparotomy, wash out and closure of abdominal incision  Surgeon: Tip Zhang M.D.   Assistant: Olesya (Fellow), Miguel Best (Student)  Anesthesia: General  EBL: 5 ml  Fluids: crystalloid 100ml and Colloid 250ml  UO: 20  Drains: none  Specimen: Fluid from peritoneal cavity for gram stain and cultures.  Complications: None  Findings:  Inflamed caecum and ascending colon, looks better than yesterday  Indication: Possible gangrene / perforation of bowel 2/2 typhlitis.  Patient was previously taken to OR for peritonitis, persistent metabolic acidosis, but the bowel was not resected, AbThera was placed with plan of second look.  After discussing the risks and benefits of surgery and potential complications, the patient provided informed consent.      DETAILS OF PROCEDURE:  The patient was brought to the operating room, placed in a supine position.  Perioperative prophylactic IV antibiotics were given.  Anesthesia was adminisitered. Vac dressing over upper midline incision removed and skin prepped and draped in the usual sterile fashion.  Time out was performed.     There was slightly hazy fluid in the peritoneal cavity, sample sent for gram stain, cultures. Exploration of the abdominal cavity revealed slight improvement of the inflammation seen yesterday. The intestines appeared pink and were not distended. The caecum, asceding colon and the adjacent mesentry was still inflamed with mild oedema  but no evidence of bowel perforation or gangrene. The transplanted liver looked good, well perfused and soft. The abdominal cavity was irrigated with 2 Litres of warm saline and then abdominal incision was closed in single layer with looped 0 PDS. Skin apposed with skin staplers and primapore dressing applied.   All needle, sponge, and instrument counts were accurate.  The patient  tolerated the procedure well without apparent complications and was trasfered to the PACU in good condition.  Faculty was present for critical portions of the procedure.

## 2017-07-06 ENCOUNTER — APPOINTMENT (OUTPATIENT)
Dept: CARDIOLOGY | Facility: CLINIC | Age: 52
End: 2017-07-06
Attending: TRANSPLANT SURGERY
Payer: COMMERCIAL

## 2017-07-06 ENCOUNTER — APPOINTMENT (OUTPATIENT)
Dept: GENERAL RADIOLOGY | Facility: CLINIC | Age: 52
End: 2017-07-06
Attending: SURGERY
Payer: COMMERCIAL

## 2017-07-06 LAB
ALBUMIN UR-MCNC: 30 MG/DL
ANION GAP SERPL CALCULATED.3IONS-SCNC: 10 MMOL/L (ref 3–14)
ANION GAP SERPL CALCULATED.3IONS-SCNC: 10 MMOL/L (ref 3–14)
ANION GAP SERPL CALCULATED.3IONS-SCNC: 8 MMOL/L (ref 3–14)
ANION GAP SERPL CALCULATED.3IONS-SCNC: 9 MMOL/L (ref 3–14)
APPEARANCE UR: ABNORMAL
APTT PPP: 47 SEC (ref 22–37)
APTT PPP: 48 SEC (ref 22–37)
BASE DEFICIT BLDA-SCNC: 9.7 MMOL/L
BASE DEFICIT BLDA-SCNC: 9.9 MMOL/L
BASOPHILS # BLD AUTO: 0 10E9/L (ref 0–0.2)
BASOPHILS NFR BLD AUTO: 0 %
BILIRUB UR QL STRIP: NEGATIVE
BLD PROD TYP BPU: NORMAL
BLD UNIT ID BPU: 0
BLOOD PRODUCT CODE: NORMAL
BPU ID: NORMAL
BUN SERPL-MCNC: 62 MG/DL (ref 7–30)
BUN SERPL-MCNC: 65 MG/DL (ref 7–30)
BUN SERPL-MCNC: 74 MG/DL (ref 7–30)
BUN SERPL-MCNC: 75 MG/DL (ref 7–30)
CA-I BLD-MCNC: 4.2 MG/DL (ref 4.4–5.2)
CALCIUM SERPL-MCNC: 6.7 MG/DL (ref 8.5–10.1)
CALCIUM SERPL-MCNC: 6.9 MG/DL (ref 8.5–10.1)
CALCIUM SERPL-MCNC: 7.5 MG/DL (ref 8.5–10.1)
CALCIUM SERPL-MCNC: 7.6 MG/DL (ref 8.5–10.1)
CHLORIDE SERPL-SCNC: 113 MMOL/L (ref 94–109)
CHLORIDE SERPL-SCNC: 116 MMOL/L (ref 94–109)
CHLORIDE SERPL-SCNC: 116 MMOL/L (ref 94–109)
CHLORIDE SERPL-SCNC: 117 MMOL/L (ref 94–109)
CO2 SERPL-SCNC: 18 MMOL/L (ref 20–32)
CO2 SERPL-SCNC: 19 MMOL/L (ref 20–32)
COLOR UR AUTO: YELLOW
CREAT SERPL-MCNC: 2.75 MG/DL (ref 0.66–1.25)
CREAT SERPL-MCNC: 2.78 MG/DL (ref 0.66–1.25)
CREAT SERPL-MCNC: 2.78 MG/DL (ref 0.66–1.25)
CREAT SERPL-MCNC: 2.79 MG/DL (ref 0.66–1.25)
CRP SERPL HS-MCNC: 354 MG/L
DIFFERENTIAL METHOD BLD: ABNORMAL
EOSINOPHIL # BLD AUTO: 0 10E9/L (ref 0–0.7)
EOSINOPHIL NFR BLD AUTO: 0.9 %
EOSINOPHIL NFR BLD AUTO: 1.8 %
EOSINOPHIL NFR BLD AUTO: 2.3 %
EOSINOPHIL NFR BLD AUTO: 3.5 %
EOSINOPHIL NFR BLD AUTO: 3.5 %
ERYTHROCYTE [DISTWIDTH] IN BLOOD BY AUTOMATED COUNT: 15.1 % (ref 10–15)
ERYTHROCYTE [DISTWIDTH] IN BLOOD BY AUTOMATED COUNT: 15.2 % (ref 10–15)
ERYTHROCYTE [DISTWIDTH] IN BLOOD BY AUTOMATED COUNT: 15.3 % (ref 10–15)
ERYTHROCYTE [DISTWIDTH] IN BLOOD BY AUTOMATED COUNT: 15.4 % (ref 10–15)
FACT VIII ACT/NOR PPP: 182 % (ref 55–200)
FACT VIII ACT/NOR PPP: NORMAL % (ref 55–200)
FACT X ACT/NOR PPP CHRO: 49 % (ref 70–130)
FACT X ACT/NOR PPP CHRO: NORMAL % (ref 70–130)
FIBRINOGEN PPP-MCNC: 527 MG/DL (ref 200–420)
FIBRINOGEN PPP-MCNC: 538 MG/DL (ref 200–420)
GFR SERPL CREATININE-BSD FRML MDRD: 24 ML/MIN/1.7M2
GLUCOSE BLDC GLUCOMTR-MCNC: 113 MG/DL (ref 70–99)
GLUCOSE BLDC GLUCOMTR-MCNC: 125 MG/DL (ref 70–99)
GLUCOSE BLDC GLUCOMTR-MCNC: 127 MG/DL (ref 70–99)
GLUCOSE BLDC GLUCOMTR-MCNC: 139 MG/DL (ref 70–99)
GLUCOSE BLDC GLUCOMTR-MCNC: 141 MG/DL (ref 70–99)
GLUCOSE BLDC GLUCOMTR-MCNC: 145 MG/DL (ref 70–99)
GLUCOSE BLDC GLUCOMTR-MCNC: 155 MG/DL (ref 70–99)
GLUCOSE BLDC GLUCOMTR-MCNC: 167 MG/DL (ref 70–99)
GLUCOSE SERPL-MCNC: 115 MG/DL (ref 70–99)
GLUCOSE SERPL-MCNC: 139 MG/DL (ref 70–99)
GLUCOSE SERPL-MCNC: 140 MG/DL (ref 70–99)
GLUCOSE SERPL-MCNC: 159 MG/DL (ref 70–99)
GLUCOSE UR STRIP-MCNC: NEGATIVE MG/DL
HAPTOGLOB SERPL-MCNC: 162 MG/DL (ref 15–200)
HBA1C MFR BLD: NORMAL % (ref 4.3–6)
HCO3 BLD-SCNC: 16 MMOL/L (ref 21–28)
HCO3 BLD-SCNC: 17 MMOL/L (ref 21–28)
HCT VFR BLD AUTO: 20.2 % (ref 40–53)
HCT VFR BLD AUTO: 20.8 % (ref 40–53)
HCT VFR BLD AUTO: 21.5 % (ref 40–53)
HCT VFR BLD AUTO: 22.1 % (ref 40–53)
HCT VFR BLD AUTO: 22.6 % (ref 40–53)
HCT VFR BLD AUTO: 23 % (ref 40–53)
HGB BLD-MCNC: 6.7 G/DL (ref 13.3–17.7)
HGB BLD-MCNC: 6.9 G/DL (ref 13.3–17.7)
HGB BLD-MCNC: 7.1 G/DL (ref 13.3–17.7)
HGB BLD-MCNC: 7.3 G/DL (ref 13.3–17.7)
HGB BLD-MCNC: 7.6 G/DL (ref 13.3–17.7)
HGB BLD-MCNC: 7.7 G/DL (ref 13.3–17.7)
HGB UR QL STRIP: ABNORMAL
INR PPP: 1.8 (ref 0.86–1.14)
INR PPP: 1.91 (ref 0.86–1.14)
KETONES UR STRIP-MCNC: NEGATIVE MG/DL
LACTATE BLD-SCNC: 1.2 MMOL/L (ref 0.7–2.1)
LACTATE BLD-SCNC: 1.2 MMOL/L (ref 0.7–2.1)
LACTATE BLD-SCNC: 1.4 MMOL/L (ref 0.7–2.1)
LACTATE BLD-SCNC: 1.5 MMOL/L (ref 0.7–2.1)
LEUKOCYTE ESTERASE UR QL STRIP: ABNORMAL
LYMPHOCYTES # BLD AUTO: 0.1 10E9/L (ref 0.8–5.3)
LYMPHOCYTES # BLD AUTO: 0.2 10E9/L (ref 0.8–5.3)
LYMPHOCYTES NFR BLD AUTO: 10.5 %
LYMPHOCYTES NFR BLD AUTO: 11.6 %
LYMPHOCYTES NFR BLD AUTO: 12.2 %
LYMPHOCYTES NFR BLD AUTO: 14.7 %
LYMPHOCYTES NFR BLD AUTO: 25.4 %
MAGNESIUM SERPL-MCNC: 1.9 MG/DL (ref 1.6–2.3)
MAGNESIUM SERPL-MCNC: 2.1 MG/DL (ref 1.6–2.3)
MCH RBC QN AUTO: 28.6 PG (ref 26.5–33)
MCH RBC QN AUTO: 28.6 PG (ref 26.5–33)
MCH RBC QN AUTO: 28.8 PG (ref 26.5–33)
MCH RBC QN AUTO: 28.9 PG (ref 26.5–33)
MCH RBC QN AUTO: 29.2 PG (ref 26.5–33)
MCH RBC QN AUTO: 29.5 PG (ref 26.5–33)
MCHC RBC AUTO-ENTMCNC: 33 G/DL (ref 31.5–36.5)
MCHC RBC AUTO-ENTMCNC: 33 G/DL (ref 31.5–36.5)
MCHC RBC AUTO-ENTMCNC: 33.2 G/DL (ref 31.5–36.5)
MCHC RBC AUTO-ENTMCNC: 33.2 G/DL (ref 31.5–36.5)
MCHC RBC AUTO-ENTMCNC: 33.5 G/DL (ref 31.5–36.5)
MCHC RBC AUTO-ENTMCNC: 33.6 G/DL (ref 31.5–36.5)
MCV RBC AUTO: 87 FL (ref 78–100)
MCV RBC AUTO: 88 FL (ref 78–100)
METAMYELOCYTES # BLD: 0.1 10E9/L
METAMYELOCYTES NFR BLD MANUAL: 10.4 %
METAMYELOCYTES NFR BLD MANUAL: 11 %
METAMYELOCYTES NFR BLD MANUAL: 11.4 %
METAMYELOCYTES NFR BLD MANUAL: 8 %
METAMYELOCYTES NFR BLD MANUAL: 9.6 %
MONOCYTES # BLD AUTO: 0.1 10E9/L (ref 0–1.3)
MONOCYTES # BLD AUTO: 0.2 10E9/L (ref 0–1.3)
MONOCYTES # BLD AUTO: 0.2 10E9/L (ref 0–1.3)
MONOCYTES NFR BLD AUTO: 10.5 %
MONOCYTES NFR BLD AUTO: 22 %
MONOCYTES NFR BLD AUTO: 22.6 %
MONOCYTES NFR BLD AUTO: 7.9 %
MONOCYTES NFR BLD AUTO: 8.1 %
MUCOUS THREADS #/AREA URNS LPF: PRESENT /LPF
MYELOCYTES # BLD: 0 10E9/L
MYELOCYTES # BLD: 0.1 10E9/L
MYELOCYTES NFR BLD MANUAL: 2.8 %
MYELOCYTES NFR BLD MANUAL: 5.2 %
MYELOCYTES NFR BLD MANUAL: 6.1 %
MYELOCYTES NFR BLD MANUAL: 7.9 %
NEUTROPHILS # BLD AUTO: 0.3 10E9/L (ref 1.6–8.3)
NEUTROPHILS # BLD AUTO: 0.4 10E9/L (ref 1.6–8.3)
NEUTROPHILS # BLD AUTO: 0.5 10E9/L (ref 1.6–8.3)
NEUTROPHILS # BLD AUTO: 0.6 10E9/L (ref 1.6–8.3)
NEUTROPHILS # BLD AUTO: 0.6 10E9/L (ref 1.6–8.3)
NEUTROPHILS NFR BLD AUTO: 43.1 %
NEUTROPHILS NFR BLD AUTO: 46.1 %
NEUTROPHILS NFR BLD AUTO: 47.2 %
NEUTROPHILS NFR BLD AUTO: 62.3 %
NEUTROPHILS NFR BLD AUTO: 71.4 %
NITRATE UR QL: NEGATIVE
O2/TOTAL GAS SETTING VFR VENT: 30 %
O2/TOTAL GAS SETTING VFR VENT: 30 %
OXYHGB MFR BLD: 96 % (ref 92–100)
PCO2 BLD: 35 MM HG (ref 35–45)
PCO2 BLD: 37 MM HG (ref 35–45)
PELGER HUET CELLS BLD QL SMEAR: PRESENT
PH BLD: 7.26 PH (ref 7.35–7.45)
PH BLD: 7.27 PH (ref 7.35–7.45)
PH UR STRIP: 5 PH (ref 5–7)
PHOSPHATE SERPL-MCNC: 5.5 MG/DL (ref 2.5–4.5)
PHOSPHATE SERPL-MCNC: 6 MG/DL (ref 2.5–4.5)
PLATELET # BLD AUTO: 22 10E9/L (ref 150–450)
PLATELET # BLD AUTO: 23 10E9/L (ref 150–450)
PLATELET # BLD AUTO: 27 10E9/L (ref 150–450)
PLATELET # BLD AUTO: 27 10E9/L (ref 150–450)
PLATELET # BLD AUTO: 28 10E9/L (ref 150–450)
PLATELET # BLD AUTO: 34 10E9/L (ref 150–450)
PLATELET # BLD EST: ABNORMAL 10*3/UL
PO2 BLD: 109 MM HG (ref 80–105)
PO2 BLD: 85 MM HG (ref 80–105)
POLYCHROMASIA BLD QL SMEAR: SLIGHT
POLYCHROMASIA BLD QL SMEAR: SLIGHT
POTASSIUM SERPL-SCNC: 4.3 MMOL/L (ref 3.4–5.3)
POTASSIUM SERPL-SCNC: 4.5 MMOL/L (ref 3.4–5.3)
POTASSIUM SERPL-SCNC: 4.6 MMOL/L (ref 3.4–5.3)
POTASSIUM SERPL-SCNC: 4.9 MMOL/L (ref 3.4–5.3)
POTASSIUM SERPL-SCNC: 5 MMOL/L (ref 3.4–5.3)
POTASSIUM SERPL-SCNC: 5.2 MMOL/L (ref 3.4–5.3)
PREALB SERPL IA-MCNC: 9 MG/DL (ref 15–45)
RBC # BLD AUTO: 2.32 10E12/L (ref 4.4–5.9)
RBC # BLD AUTO: 2.4 10E12/L (ref 4.4–5.9)
RBC # BLD AUTO: 2.48 10E12/L (ref 4.4–5.9)
RBC # BLD AUTO: 2.55 10E12/L (ref 4.4–5.9)
RBC # BLD AUTO: 2.58 10E12/L (ref 4.4–5.9)
RBC # BLD AUTO: 2.64 10E12/L (ref 4.4–5.9)
RBC #/AREA URNS AUTO: >182 /HPF (ref 0–2)
RBC MORPH BLD: NORMAL
RETICS # AUTO: 39.4 10E9/L (ref 25–95)
RETICS # AUTO: 47.8 10E9/L (ref 25–95)
RETICS/RBC NFR AUTO: 1.6 % (ref 0.5–2)
RETICS/RBC NFR AUTO: 1.9 % (ref 0.5–2)
SODIUM SERPL-SCNC: 141 MMOL/L (ref 133–144)
SODIUM SERPL-SCNC: 143 MMOL/L (ref 133–144)
SODIUM SERPL-SCNC: 143 MMOL/L (ref 133–144)
SODIUM SERPL-SCNC: 145 MMOL/L (ref 133–144)
SP GR UR STRIP: 1.01 (ref 1–1.03)
TACROLIMUS BLD-MCNC: 6.3 UG/L (ref 5–15)
TME LAST DOSE: NORMAL H
TRANSFUSION STATUS PATIENT QL: NORMAL
TRANSFUSION STATUS PATIENT QL: NORMAL
URN SPEC COLLECT METH UR: ABNORMAL
UROBILINOGEN UR STRIP-MCNC: NORMAL MG/DL (ref 0–2)
VANCOMYCIN SERPL-MCNC: 22.1 MG/L
WBC # BLD AUTO: 0.8 10E9/L (ref 4–11)
WBC # BLD AUTO: 0.8 10E9/L (ref 4–11)
WBC # BLD AUTO: 0.9 10E9/L (ref 4–11)
WBC # BLD AUTO: 1 10E9/L (ref 4–11)
WBC #/AREA URNS AUTO: 9 /HPF (ref 0–2)

## 2017-07-06 PROCEDURE — 80048 BASIC METABOLIC PNL TOTAL CA: CPT | Performed by: SURGERY

## 2017-07-06 PROCEDURE — 83605 ASSAY OF LACTIC ACID: CPT | Performed by: TRANSPLANT SURGERY

## 2017-07-06 PROCEDURE — 25000128 H RX IP 250 OP 636: Performed by: STUDENT IN AN ORGANIZED HEALTH CARE EDUCATION/TRAINING PROGRAM

## 2017-07-06 PROCEDURE — 85384 FIBRINOGEN ACTIVITY: CPT | Performed by: SURGERY

## 2017-07-06 PROCEDURE — 25000128 H RX IP 250 OP 636: Performed by: SURGERY

## 2017-07-06 PROCEDURE — 93306 TTE W/DOPPLER COMPLETE: CPT

## 2017-07-06 PROCEDURE — 85027 COMPLETE CBC AUTOMATED: CPT | Performed by: SURGERY

## 2017-07-06 PROCEDURE — 85045 AUTOMATED RETICULOCYTE COUNT: CPT | Performed by: PHYSICIAN ASSISTANT

## 2017-07-06 PROCEDURE — 85240 CLOT FACTOR VIII AHG 1 STAGE: CPT | Performed by: SURGERY

## 2017-07-06 PROCEDURE — 25000132 ZZH RX MED GY IP 250 OP 250 PS 637: Performed by: STUDENT IN AN ORGANIZED HEALTH CARE EDUCATION/TRAINING PROGRAM

## 2017-07-06 PROCEDURE — 84100 ASSAY OF PHOSPHORUS: CPT | Performed by: SURGERY

## 2017-07-06 PROCEDURE — 85025 COMPLETE CBC W/AUTO DIFF WBC: CPT | Performed by: SURGERY

## 2017-07-06 PROCEDURE — 40000275 ZZH STATISTIC RCP TIME EA 10 MIN

## 2017-07-06 PROCEDURE — 99221 1ST HOSP IP/OBS SF/LOW 40: CPT | Mod: GC | Performed by: INTERNAL MEDICINE

## 2017-07-06 PROCEDURE — 25000131 ZZH RX MED GY IP 250 OP 636 PS 637: Performed by: TRANSPLANT SURGERY

## 2017-07-06 PROCEDURE — 25000125 ZZHC RX 250: Performed by: TRANSPLANT SURGERY

## 2017-07-06 PROCEDURE — 85730 THROMBOPLASTIN TIME PARTIAL: CPT | Performed by: SURGERY

## 2017-07-06 PROCEDURE — 25000125 ZZHC RX 250: Performed by: PHYSICIAN ASSISTANT

## 2017-07-06 PROCEDURE — P9047 ALBUMIN (HUMAN), 25%, 50ML: HCPCS | Performed by: SURGERY

## 2017-07-06 PROCEDURE — 20000004 ZZH R&B ICU UMMC

## 2017-07-06 PROCEDURE — 85260 CLOT FACTOR X STUART-POWER: CPT | Performed by: SURGERY

## 2017-07-06 PROCEDURE — 25000131 ZZH RX MED GY IP 250 OP 636 PS 637: Performed by: SURGERY

## 2017-07-06 PROCEDURE — 25000128 H RX IP 250 OP 636: Performed by: TRANSPLANT SURGERY

## 2017-07-06 PROCEDURE — 81001 URINALYSIS AUTO W/SCOPE: CPT | Performed by: INTERNAL MEDICINE

## 2017-07-06 PROCEDURE — 82330 ASSAY OF CALCIUM: CPT | Performed by: TRANSPLANT SURGERY

## 2017-07-06 PROCEDURE — 99291 CRITICAL CARE FIRST HOUR: CPT | Performed by: PHYSICIAN ASSISTANT

## 2017-07-06 PROCEDURE — 00000401 ZZHCL STATISTIC THROMBIN TIME NC: Performed by: SURGERY

## 2017-07-06 PROCEDURE — 25000132 ZZH RX MED GY IP 250 OP 250 PS 637

## 2017-07-06 PROCEDURE — 83036 HEMOGLOBIN GLYCOSYLATED A1C: CPT | Performed by: SURGERY

## 2017-07-06 PROCEDURE — 94003 VENT MGMT INPAT SUBQ DAY: CPT

## 2017-07-06 PROCEDURE — 82803 BLOOD GASES ANY COMBINATION: CPT | Performed by: TRANSPLANT SURGERY

## 2017-07-06 PROCEDURE — 25000128 H RX IP 250 OP 636: Performed by: PHYSICIAN ASSISTANT

## 2017-07-06 PROCEDURE — 40000014 ZZH STATISTIC ARTERIAL MONITORING DAILY

## 2017-07-06 PROCEDURE — 00000146 ZZHCL STATISTIC GLUCOSE BY METER IP

## 2017-07-06 PROCEDURE — 83605 ASSAY OF LACTIC ACID: CPT | Performed by: SURGERY

## 2017-07-06 PROCEDURE — 40000196 ZZH STATISTIC RAPCV CVP MONITORING

## 2017-07-06 PROCEDURE — 85025 COMPLETE CBC W/AUTO DIFF WBC: CPT | Performed by: PHYSICIAN ASSISTANT

## 2017-07-06 PROCEDURE — 82805 BLOOD GASES W/O2 SATURATION: CPT | Performed by: SURGERY

## 2017-07-06 PROCEDURE — 40000611 ZZHCL STATISTIC MORPHOLOGY W/INTERP HEMEPATH TC 85060: Performed by: PHYSICIAN ASSISTANT

## 2017-07-06 PROCEDURE — 83735 ASSAY OF MAGNESIUM: CPT | Performed by: SURGERY

## 2017-07-06 PROCEDURE — 71010 XR CHEST PORT 1 VW: CPT

## 2017-07-06 PROCEDURE — 84132 ASSAY OF SERUM POTASSIUM: CPT | Performed by: PHYSICIAN ASSISTANT

## 2017-07-06 PROCEDURE — 80048 BASIC METABOLIC PNL TOTAL CA: CPT | Performed by: PHYSICIAN ASSISTANT

## 2017-07-06 PROCEDURE — P9016 RBC LEUKOCYTES REDUCED: HCPCS | Performed by: TRANSPLANT SURGERY

## 2017-07-06 PROCEDURE — 85610 PROTHROMBIN TIME: CPT | Performed by: SURGERY

## 2017-07-06 PROCEDURE — 93306 TTE W/DOPPLER COMPLETE: CPT | Mod: 26 | Performed by: INTERNAL MEDICINE

## 2017-07-06 PROCEDURE — 25000125 ZZHC RX 250: Performed by: SURGERY

## 2017-07-06 PROCEDURE — 25000125 ZZHC RX 250: Performed by: STUDENT IN AN ORGANIZED HEALTH CARE EDUCATION/TRAINING PROGRAM

## 2017-07-06 PROCEDURE — 80197 ASSAY OF TACROLIMUS: CPT | Performed by: TRANSPLANT SURGERY

## 2017-07-06 PROCEDURE — 80202 ASSAY OF VANCOMYCIN: CPT | Performed by: SURGERY

## 2017-07-06 PROCEDURE — 85027 COMPLETE CBC AUTOMATED: CPT | Performed by: TRANSPLANT SURGERY

## 2017-07-06 RX ORDER — MIDAZOLAM (PF) 1 MG/ML IN 0.9 % SODIUM CHLORIDE INTRAVENOUS SOLUTION
1-2 CONTINUOUS
Status: DISCONTINUED | OUTPATIENT
Start: 2017-07-06 | End: 2017-07-06

## 2017-07-06 RX ORDER — FENTANYL CITRATE 50 UG/ML
25-50 INJECTION, SOLUTION INTRAMUSCULAR; INTRAVENOUS
Status: DISCONTINUED | OUTPATIENT
Start: 2017-07-06 | End: 2017-07-11

## 2017-07-06 RX ORDER — ERTAPENEM 1 G/1
1 INJECTION, POWDER, LYOPHILIZED, FOR SOLUTION INTRAMUSCULAR; INTRAVENOUS EVERY 24 HOURS
Status: DISCONTINUED | OUTPATIENT
Start: 2017-07-06 | End: 2017-07-12

## 2017-07-06 RX ORDER — PROPOFOL 10 MG/ML
5-75 INJECTION, EMULSION INTRAVENOUS CONTINUOUS
Status: DISCONTINUED | OUTPATIENT
Start: 2017-07-06 | End: 2017-07-08

## 2017-07-06 RX ORDER — FUROSEMIDE 10 MG/ML
20 INJECTION INTRAMUSCULAR; INTRAVENOUS ONCE
Status: COMPLETED | OUTPATIENT
Start: 2017-07-06 | End: 2017-07-06

## 2017-07-06 RX ADMIN — FENTANYL CITRATE 100 MCG/HR: 50 INJECTION, SOLUTION INTRAMUSCULAR; INTRAVENOUS at 06:22

## 2017-07-06 RX ADMIN — FILGRASTIM 480 MCG: 480 INJECTION, SOLUTION INTRAVENOUS; SUBCUTANEOUS at 20:47

## 2017-07-06 RX ADMIN — PIPERACILLIN AND TAZOBACTAM 2.25 G: 2; .25 INJECTION, POWDER, LYOPHILIZED, FOR SOLUTION INTRAVENOUS; PARENTERAL at 02:22

## 2017-07-06 RX ADMIN — ERTAPENEM SODIUM 1 G: 1 INJECTION, POWDER, LYOPHILIZED, FOR SOLUTION INTRAMUSCULAR; INTRAVENOUS at 10:49

## 2017-07-06 RX ADMIN — CALCIUM GLUCONATE 1 G: 94 INJECTION, SOLUTION INTRAVENOUS at 13:25

## 2017-07-06 RX ADMIN — VANCOMYCIN HYDROCHLORIDE 1750 MG: 10 INJECTION, POWDER, LYOPHILIZED, FOR SOLUTION INTRAVENOUS at 14:18

## 2017-07-06 RX ADMIN — ACETAMINOPHEN 650 MG: 325 TABLET, FILM COATED ORAL at 08:22

## 2017-07-06 RX ADMIN — FENTANYL CITRATE 100 MCG/HR: 50 INJECTION, SOLUTION INTRAMUSCULAR; INTRAVENOUS at 20:36

## 2017-07-06 RX ADMIN — MIDAZOLAM 2 MG: 1 INJECTION INTRAMUSCULAR; INTRAVENOUS at 03:05

## 2017-07-06 RX ADMIN — HUMAN INSULIN 1 UNITS/HR: 100 INJECTION, SOLUTION SUBCUTANEOUS at 16:37

## 2017-07-06 RX ADMIN — NOREPINEPHRINE BITARTRATE 0.06 MCG/KG/MIN: 1 INJECTION INTRAVENOUS at 09:59

## 2017-07-06 RX ADMIN — ALBUMIN (HUMAN) 12.5 G: 12.5 SOLUTION INTRAVENOUS at 00:05

## 2017-07-06 RX ADMIN — FENTANYL CITRATE 50 MCG: 50 INJECTION, SOLUTION INTRAMUSCULAR; INTRAVENOUS at 23:18

## 2017-07-06 RX ADMIN — MIDAZOLAM 2 MG: 1 INJECTION INTRAMUSCULAR; INTRAVENOUS at 01:17

## 2017-07-06 RX ADMIN — TACROLIMUS 4 MG: 5 CAPSULE ORAL at 08:22

## 2017-07-06 RX ADMIN — FUROSEMIDE 20 MG: 10 INJECTION, SOLUTION INTRAVENOUS at 19:06

## 2017-07-06 RX ADMIN — FLUDROCORTISONE ACETATE 0.1 MG: 0.1 TABLET ORAL at 08:22

## 2017-07-06 RX ADMIN — ALBUMIN (HUMAN) 12.5 G: 12.5 SOLUTION INTRAVENOUS at 05:56

## 2017-07-06 RX ADMIN — Medication 2 MG/HR: at 02:14

## 2017-07-06 RX ADMIN — PANTOPRAZOLE SODIUM 40 MG: 40 INJECTION, POWDER, FOR SOLUTION INTRAVENOUS at 21:36

## 2017-07-06 RX ADMIN — MICAFUNGIN SODIUM 100 MG: 10 INJECTION, POWDER, LYOPHILIZED, FOR SOLUTION INTRAVENOUS at 12:16

## 2017-07-06 RX ADMIN — TACROLIMUS 3 MG: 5 CAPSULE ORAL at 17:44

## 2017-07-06 RX ADMIN — HYDROMORPHONE HYDROCHLORIDE 0.5 MG: 1 INJECTION, SOLUTION INTRAMUSCULAR; INTRAVENOUS; SUBCUTANEOUS at 17:02

## 2017-07-06 RX ADMIN — METRONIDAZOLE 500 MG: 500 INJECTION, SOLUTION INTRAVENOUS at 01:19

## 2017-07-06 RX ADMIN — MAGNESIUM SULFATE HEPTAHYDRATE: 500 INJECTION, SOLUTION INTRAMUSCULAR; INTRAVENOUS at 20:42

## 2017-07-06 RX ADMIN — I.V. FAT EMULSION 250 ML: 20 EMULSION INTRAVENOUS at 20:50

## 2017-07-06 RX ADMIN — PIPERACILLIN AND TAZOBACTAM 2.25 G: 2; .25 INJECTION, POWDER, LYOPHILIZED, FOR SOLUTION INTRAVENOUS; PARENTERAL at 08:22

## 2017-07-06 NOTE — PLAN OF CARE
Problem: Sepsis (Adult)  Goal: Signs and Symptoms of Listed Potential Problems Will be Absent or Manageable (Sepsis)  Signs and symptoms of listed potential problems will be absent or manageable by discharge/transition of care (reference Sepsis (Adult) CPG).  Outcome: No Change  D: 51 y.o M admitted with septic shock, hemodynamically unstable & pancytopenic.   I/A: T max 99.6. HR 's, no ectopy noted. MAP >65, levophed gtt titrated accordingly. CVP 14-12-11. Sedated, versed 2 mg & fentanyl 150 mcg. Propofol DC'd r/t low MAP's. Vent 30%/22/5/500. RR 22-28. PEAK's 17-22. Small amount of cloudy white secretions from ETT. UO 30-50 mL/hr. x1 PLT, x1 PRBC overnight. Lytes: K+ down to 5.0 from 6.2.   P: Continue to monitor.

## 2017-07-06 NOTE — PLAN OF CARE
Problem: Goal Outcome Summary  Goal: Goal Outcome Summary  Outcome: Improving  CV: sinus tachycardia. Low 100's - 115's. MAP goal is above 65. Pt on Levophed this AM. Was able to be titrated off in the afternoon. Pt had a temperature of 100.4 this AM. Tylenol given.  Pt had a critical HGB. 1 unit PRBC given. Next HGB recheck is at 2000.    Respiratory: Pressure support in AM for 90 minutes. 30% 7/5. Pt experienced tachypnea and was tachycardic.  Regular vent settings resumed mid morning. CMV/AC  30%,22, 500, 5.      GI/: No BM. Pt getting TPN. NG to LIS. Hypoactive bowel sounds.  Crowe in place. Adequate urine output.      Neuro: Versed turned off late morning.  Pt obtunded. Not following commands, eyes open but not tracking. Increased restlessness throughout day. 0.5 mg dilaudid given 1700.     Drips: Fentanyl 100            Versed 1 mg in Am- off            Insulin at 1.             TPN at 65            NS at 60.      Per MD TPN plus NS should equal 125 mL/hr.     Will continue to assess.

## 2017-07-06 NOTE — PLAN OF CARE
Problem: Restraint for Non-Violent/Non-Self-Destructive Behavior  Goal: Prevent/Manage Potential Problems  Maintain safety of patient and others during period of restraint.  Promote psychological and physical wellbeing.  Prevent injury to skin and involved body parts.  Promote nutrition, hydration, and elimination.   Outcome: No Change  BUE soft wrist restrains continued. No s/s of injury noted. Will DC restraints when DC criteria is met.

## 2017-07-06 NOTE — PROGRESS NOTES
SURGICAL ICU PROGRESS NOTE  July 6, 2017      CO-MORBIDITIES:   Neutropenic colitis (H)  (primary encounter diagnosis)  Liver transplant recipient 2/2 [facility]  Type II DM  RONNIE on BiPAP     ASSESSMENT: Camacho Bhagat is a 51 year old male with PMHx significant for liver transplant March 2017 2/2 to [facility] from [facility] admitted on 7/4 for abdominal pain and fever found to have neutropenic colitis s/p ex lap on 7/4 and 7/5.     Events/Changes 7/6:  - Zosyn changed to Ertapenem per transplant for thrombocytopenia  - Complete Echo today to check heart function  - CXR concerning for pleural effusion, follow  - Discontinue versed gtt, switch to propofol gtt   - Total IV fluids at 125 ml/hr in combination with TPN  - Complete echo today  - Stop scheduled albumin  - hematology referral for thrombocytopenia, placed by transplant  - retic count   - peripheral smear   - Consult for transplant ID, place by transplant--> per discussion with transplant after ID recommendations, will D/C vanc and micafungin and continue ertapenem instead of zosyn.      PLAN:  Neuro/ pain/ sedation:  - Fentanyl infusion  mcg/hr  - D/C versed gtt which was started overnight given hypotension, although did not have effect on blood pressure   - start propofol gtt   - Dilaudid IV PRN  - PRN versed       CV:   #Hypotension secondary to septic shock   - Continuous cardiac monitoring.   - Requiring consistent amounts of NE- 0.16mcg/hr currently   - Distal extremities cold and midly dusky  - BNP elevated to [address]  - Formal Echo today given dusky extremities and elevated BNP  - LA WNL currently      Pulm:   #Bilateral pleural effusions (R>L) with patchy bibasilar opacities on chest x-ray  - Vent mode AC, RR 12, , PEEP 5. Fi02 50  - Bronch yesterday with thick mucus plug removed  - CXR today with continues opacification of R lung, likely pulmonary effusion, will not place pigtail or chest tube at this time given pancytopenia  - Repeat chest x-ray  tomorrow      FEN/ GI:   # Neutropenic Colitis in setting of immunosupression  # [facility] s/p  liver transplantation 3/2017  - OR on 7/4 for ex lap, no bowel resection and abdomen left open with wound vac in place  - OR on 7/5 for ex lap, abdomen closed, no bowel resection   - NPO except medications  - TPN via central line  - Hepatic profile looks reassuring, repeat per transplant   - Liver US per transplant- unremarkable liver transplant on imaging   - Tacrolimus through NG per transplant       Renal/Fluids:  #Acute kidney injury  #Hyperkalemia, resolving  #Metabolic acidosis with respiratory compensation  - Potassium improved after potassium cocktail, fluid boluses and lasix yesterday  - UOP has improved since yesterday   - Cr elevated but down trending   - Fluid goal 125cc/hr, mIVF plus TPN  - D/C albumin boluses   - Nephrology following  - ICU electrolyte replacement protocol  - Crowe catheter to stay in place for strict intake and output monitoring  - Q 8 BMP    Endocrine:   # Type II DM  -  insulin gtt   - continue home florinef       ID/ Abx:  #Neutropenia in setting of immunosuppression   # Neutropenic Colitis  - Vancomycin, ertepenem and micafungin per transplant.   - D/C flagyl  - D/C zosyn and add ertapenem today given thrombocytopenia   - Outpatient valcyte prophylaxis was discontinued   - BAL 7/5 with gram positive cocci  - continue Neupogen  - CBC with diff daily for a goal ANC >1.5  - Transplant ID per transplant team       Heme:   # Pancytopenia in setting of immunosuppression   - Hemoglobin and platelet drop this AM, given 1 unit PRBC and 1 unit platelets 7/6  - order LDH, haptoglobin, retic count and peripheral smear  - consult hematology   - Q8H CMP  - Neupogen daily      Prophylaxis:    - DVT: SCD. Wait to start chemical ppx.  - GI: Ranitidine IV.       MSK:  - PT/OT held until surgery complete      Lines/Drains:  - ETT, IJ TLCVC, left axillary a-line, NG tube, Crowe     Patient seen, findings and  plan discussed with surgical ICU staff Dr. Albert.  Critical Care time: 50 mins      Zainab Eduardo PA-C  Pager: [phone]  ====================================    SUBJECTIVE:   Course reviewed. No acute events overnight. Potassium normalized throughout the night. Levophed gtt remained at steady rate throughout evening. Patient was given 1 unit PRBCs and 1 unit of platelets due to pancytopenia. PS this AM, became somewhat tachycardic with PS.     OBJECTIVE:   1. VITAL SIGNS:   Temp:  [98.5  F (36.9  C)-100.4  F (38  C)] 99.6  F (37.6  C)  Heart Rate:  [] 104  Resp:  [22-25] 22  BP: ()/(46-67) 108/58  MAP:  [39 mmHg-281 mmHg] 74 mmHg  Arterial Line BP: ()/() 114/53  FiO2 (%):  [30 %-50 %] 30 %  SpO2:  [93 %-100 %] 97 %  Ventilation Mode: CMV/AC  FiO2 (%): 30 %  Rate Set (breaths/minute): 22 breaths/min  Tidal Volume Set (mL): 500 mL  PEEP (cm H2O): 5 cmH2O  Pressure Support (cm H2O): 7 cmH2O  Oxygen Concentration (%): 30 %  Resp: 22    2. INTAKE/ OUTPUT:   I/O last 3 completed shifts:  In: 12436.89 [I.V.:4396.89; NG/GT:30; IV Piggyback:3000]  Out: 1856 [Urine:856; Emesis/NG output:700; Drains:300]    3. PHYSICAL EXAMINATION:     GEN:  male, resting in bed, sedated on exam   EYES: PERRL, Anicteric sclera.   HEENT:  Normocephalic, atraumatic, trachea midline, ETT secure  CV: RRR, no murmurs, rubs or gallops   PULM/CHEST: Diminished breath sounds on the right side, no wheezes, rales or rhonchi  GI: midline incision with bandage which is clean, dry and intact, hypoactive bowel sounds  : vazquez catheter in place, urine dark yellow and clear  EXTREMITIES: somewhat dusky extremities and cool to the touch, non-edematous extremities    NEURO: sedated on exam   SKIN: cool extremities   Imaging personally reviewed: CXR with right sided pleural effusion    4. INVESTIGATIONS:   Arterial Blood Gases     Recent Labs  Lab 07/06/17  0015 07/05/17  2026 07/05/17  1810 07/05/17  1536   PH 7.27* 7.27*  7.28* 7.17*   PCO2 35 35 37 49*   PO2 109* 155* 188* 180*   HCO3 16* 16* 17* 18*     Complete Blood Count     Recent Labs  Lab 07/06/17 0741 07/06/17 0316 07/06/17 0011 07/05/17 2026 07/05/17  1810   WBC 1.0* 0.8* 1.0*  --  0.8*   HGB 7.3* 7.1* 7.7* 6.9* 7.3*   PLT 27* 27* 34* 24* 32*     Basic Metabolic Panel    Recent Labs  Lab 07/06/17 0741 07/06/17 0316 07/06/17 0011 07/05/17 2026 07/05/17  1536   NA  --  143 141 144 141   POTASSIUM 4.9 5.0 5.2 5.6* 6.2*  6.1*   CHLORIDE  --  116* 113* 116* 113*   CO2  --  18* 18* 15* 20   BUN  --  62* 65* 59* 59*   CR  --  2.75* 2.78* 2.80* 2.89*   GLC  --  139* 115* 119* 166*     Liver Function Tests    Recent Labs  Lab 07/06/17 0316 07/06/17 0011 07/05/17 2053 07/05/17 2026 07/05/17 1043 07/05/17 0346 07/04/17 2002   AST  --   --   --  24 30 42 38   ALT  --   --   --  16 19 18 18   ALKPHOS  --   --   --  29* 37* 38* 42   BILITOTAL  --   --   --  0.7 0.6 0.7 1.0   ALBUMIN  --   --   --  2.4* 2.4* 2.2* 2.5*   INR 1.80* 1.91* 1.92*  --  2.37* 2.16* 1.88*     Pancreatic Enzymes  No lab results found in last 7 days.  Coagulation Profile    Recent Labs  Lab 07/06/17 0316 07/06/17 0011 07/05/17 2053 07/05/17 1043 07/04/17 2002   INR 1.80* 1.91* 1.92* 2.37*  < > 1.88*   PTT 47* 48* 52*  --   --  49*   < > = values in this interval not displayed.      5. RADIOLOGY:   Recent Results (from the past 24 hour(s))   XR Chest Port 1 View    Narrative    Exam: XR CHEST PORT 1 VW, 7/6/2017 9:26 AM    Indication: follow up     Comparison: 7/5/2017.    Findings:   Single AP view the chest. ET tube tip approximately 6.1 cm from the  chacorta, right IJ central venous catheter with tip at the SVC, and  enteric tube with tip outside the field-of-view. Surgical clips over  the right upper quadrant. Stable low lung volumes. Cardiac silhouette  is unchanged in largely obscured. Pulmonary vascular is indistinct.  Increased layering right-sided pleural effusion and stable  small  left-sided pleural effusion. Increase in diffuse airspace opacities.  No pneumothorax.      Impression    Impression:   1. Low lung volumes with increasing diffuse airspace opacities, right  lung predominant. Worsening pulmonary edema with underlying infection  versus atelectasis.  2. Increased small to moderate layering right-sided pleural effusion  and small left-sided pleural effusion.       =========================================

## 2017-07-06 NOTE — PHARMACY-VANCOMYCIN DOSING SERVICE
Pharmacy Vancomycin Note  Date of Service 2017  Patient's  1965   51 year old, male    Indication: Intra-abdominal infection  Goal Trough Level: 15-20 mg/L  Day of Therapy: 3  Current Vancomycin regimen:  Intermittent following 2 doses of 1750 mg given 12 hours apart    Current estimated CrCl = Estimated Creatinine Clearance: 38 mL/min (based on Cr of 2.75).    Creatinine for last 3 days  2017:  7:35 AM Creatinine 1.56 mg/dL;  2:02 PM Creatinine 2.16 mg/dL;  8:02 PM Creatinine 2.34 mg/dL  2017:  3:46 AM Creatinine 2.49 mg/dL; 10:43 AM Creatinine 2.69 mg/dL;  2:47 PM Creatinine 2.79 mg/dL;  3:36 PM Creatinine 2.89 mg/dL;  8:26 PM Creatinine 2.80 mg/dL  2017: 12:11 AM Creatinine 2.78 mg/dL;  3:16 AM Creatinine 2.75 mg/dL    Recent Vancomycin Levels (past 3 days)  2017:  3:16 AM Vancomycin Level 22.1 mg/L    Vancomycin IV Administrations (past 72 hours)                   vancomycin (VANCOCIN) 1,750 mg in NaCl 0.9 % 500 mL intermittent infusion (mg) 1,750 mg New Bag 17 2136     1,750 mg New Bag  0834                Nephrotoxins and other renal medications (Future)    Start     Dose/Rate Route Frequency Ordered Stop    17 1345  vancomycin (VANCOCIN) 1,750 mg in NaCl 0.9 % 250 mL intermittent infusion      1,750 mg (central catheter) Intravenous ONCE 17 1337      17 0800  tacrolimus (PROGRAF - GENERIC EQUIVALENT) suspension 4 mg      4 mg Oral EVERY MORNING. 17 1455      17 2151  vancomycin place oreilly - receiving intermittent dosing      1 each Does not apply SEE ADMIN INSTRUCTIONS 17 2152      17 2000  tacrolimus (PROGRAF - GENERIC EQUIVALENT) suspension 3 mg      3 mg Oral EVERY EVENING. 17 1448      17 1400  norepinephrine (LEVOPHED) 16 mg in D5W 250 mL infusion      0.03-0.4 mcg/kg/min × 94.2 kg (Dosing Weight)  2.6-35.3 mL/hr  Intravenous CONTINUOUS 17 1357               Contrast Orders - past 72 hours     None           Interpretation of levels and current regimen:  Trough level is expected to be within the therapeutic range at the time of this note.  Level was drawn ~ 30 hours after the last dose was given    Has serum creatinine changed > 50% in last 72 hours: Yes  Urine output:  diminished urine output  Renal Function: EJ; SCr has now leveled off    Plan:  1.  Continue with intermittent dosing.  Give vancomycin 1750 mg IV x 1 now.  2.  Pharmacy will check trough levels as appropriate in 1-2 days.    3. Serum creatinine levels will be ordered daily for the first week of therapy and at least twice weekly for subsequent weeks.        Reshma Mcguire, PharmD  pager [phone]

## 2017-07-06 NOTE — PROGRESS NOTES
Nephrology Progress Note  07/06/2017         Assessment & Recommendations:   Camacho Bhagat is a 51 year old man with h/o cirrhosis s/p OLT 3/2017 who is admitted with neutropenia and colitis, s/p exlap yesterday, no bowel removed but did have large insensible losses  1) hyperkalemia  - due to reduced GFR along with decreased distal sodium delivery, tacrolimus, acidosis.  improved overnight with medical management.  2) acute kidney injury - baseline creatinine is 1.5 - 1.8 mg/dL since OLT 3/2017.  He is non-oliguric WITH IV isotonic crystalloid in the last 24 hours. Current is creatinine is 2.7 mg/dL which is relatively stable from yesterday.  Tacrolimus level is 6.3 but at 3 am which is 9 hours after his last dose so a true trough would probably be lower.  IS per liver transplant team.    3) nongap metabolic acidosis with respiratory acidosis - better today, bicarbonate is higher and  CO2 is in 30's.  Vent management per primary team.    Repeat blood gas , manage the vents hyperventilate to decrease Co2 retention.  Managing further with IV bicarbonate (if available) would help to shift potassium intracellularly.  4) anemia due to illness, IS and blood loss etc - hematology consult today for associated worsening thrombocytopenia.  LDH and haptoglobin normal.  Peripheral smear pending.  5) hypotension - improved with volume expansion, able to go down on pressor dose overnight but remains on low dose norepinephrine.  CXR does show some pulmonary edema today.  He has not required high FiO2 so I would favor holding off on diuretic until he has a little more hemodynamic improvement.  Can stop NS that is running at 60 ml/hour and keep TPN the same.    Discussed with primary team.  Discussed d/c NS maintenance     Ninfa Hernández MD  [facility]  Department of Medicine  Division of Renal Disease and Hypertension  [phone]     Interval History :   In the last 24 hours Camacho Bhagat received  more isotonic crystalloid, his potassium came yany to 4.9 and UOP was ~800 ml in the last 24 hours.  He has ongoing GI and drain losses for a total of 3.5 liters out but had 10 liters in so overall he is net positive.   Nursing has been able to go down on pressor dose overnight.  He had a low grade fever, ID has commented on antimicrobials and recs for just pip-tazo alone.  Camacho remains intubated and sedated, does not provide history  Review of Systems:   Cannot obtain due to intubated sedated status    Physical Exam:   I/O last 3 completed shifts:  In: 8550.04 [I.V.:3939.04; NG/GT:60; IV Piggyback:2000]  Out: 1539 [Urine:939; Emesis/NG output:600]   /58  Pulse 101  Temp 99  F (37.2  C) (Axillary)  Resp 23  Ht 1.829 m (6')  Wt 97 kg (213 lb 13.5 oz)  SpO2 99%  BMI 29 kg/m2     GENERAL APPEARANCE: intubated, sedated  EYES:  no scleral icterus, pupils equal  HENT: ET tube  PULM: lungs clear to auscultation bilaterally, equal air movement, no clubbing  CV: regular rhythm, normal rate, no rub     -JVP not elevated     -edema trace   Neuro - sedated  Lines right IJ    Labs:   All labs reviewed by me  Electrolytes/Renal -   Recent Labs   Lab Test  07/06/17   1555  07/06/17   1228  07/06/17   0741  07/06/17   0316  07/06/17   0011  07/05/17 2026   NA  145*   --    --   143  141  144   POTASSIUM  4.5  4.6  4.9  5.0  5.2  5.6*   CHLORIDE  117*   --    --   116*  113*  116*   CO2  19*   --    --   18*  18*  15*   BUN  74*   --    --   62*  65*  59*   CR  2.78*   --    --   2.75*  2.78*  2.80*   GLC  159*   --    --   139*  115*  119*   JACOB  7.5*   --    --   6.9*  6.7*  6.8*   MAG   --    --    --   2.1  1.9  2.1   PHOS   --    --    --   5.5*  6.0*  5.4*       CBC -   Recent Labs   Lab Test  07/06/17   1555  07/06/17   1228  07/06/17   0741   WBC  0.8*  0.9*  1.0*   HGB  6.7*  6.9*  7.3*   PLT  23*  22*  27*       LFTs -   Recent Labs   Lab Test  07/05/17   2026  07/05/17   1043  07/05/17   0346   ALKPHOS  29*   37*  38*   BILITOTAL  0.7  0.6  0.7   ALT  16  19  18   AST  24  30  42   PROTTOTAL  4.5*  4.5*  4.4*   ALBUMIN  2.4*  2.4*  2.2*       Iron Panel -   Recent Labs   Lab Test  02/08/16   1144   IRON  93   IRONSAT  41         Imaging:  All imaging studies reviewed by me.     Current Medications:    ertapenem (INVanz) IV  1 g Intravenous Q24H     filgrastim (NEUPOGEN/GRANIX) intravenous  5 mcg/kg Intravenous Daily at 8 pm     pantoprazole  40 mg Intravenous Q24H     fludrocortisone  0.1 mg Oral Daily     sodium chloride (PF)  3 mL Intracatheter Q8H     tacrolimus  3 mg Oral QPM     tacrolimus  4 mg Oral QAM       propofol (DIPRIVAN) infusion Stopped (07/06/17 1050)     parenteral nutrition - ADULT compounded formula       NaCl 60 mL/hr at 07/06/17 1300     IV fluid REPLACEMENT ONLY       insulin (regular) 1 Units/hr (07/06/17 1700)     parenteral nutrition - ADULT compounded formula 65 mL/hr at 07/05/17 2038     fentaNYL 100 mcg/hr (07/06/17 1700)     norepinephrine 0.02 mcg/kg/min (07/06/17 1713)     IV fluid REPLACEMENT ONLY       Ninfa Hernández MD

## 2017-07-06 NOTE — CONSULTS
[facility]  Transplant Infectious Disease Consult Note - New Patient     Patient:  Camacho Bhagat, Date of birth 1965, Medical record number [id number]  Date of Visit:  07/06/2017  Consult requested by Ada Driver for evaluation of abdominal infection         Assessment and Recommendations:   Recommendations:  - discontinue vancomycin, flagyl, ertapenem, and micafungin  - resume zosyn - he had previous thrombocytopenia (not as a result of zosyn) and would not expect worsening with resumption of zosyn  - if patient is having diarrhea, please check CDiff PCR  - follow weekly CMV PCR while off valcyte  - agree with decrease in immunosuppression  - will follow cultures    Assessment: 51 y.o. 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 He is now s/p ex-lap and washout with wound closure. We are asked to comment on antibiotics and ongoing workup.    Colitis - he developed acute onset of pain on Friday. He is s/p ex-lap on 7/4 showing inflamed cecum and ascending colon. He had wound closure on 7/5. Cultures from intra-op are no growth so far. Unclear if he has been having diarrhea but would send for C. Diff testing. In terms of antibiotic selection, zosyn alone should be sufficient in covering anaerobes and enteric gram negatives. His pancytopenia pre-existed zosyn administration and pancytopenia is a very uncommon occurrence with zosyn. Would discontinue the micafungin as there is low likelihood that this colitis is due to candida. He does not require vancomycin or flagyl going forward as well.     Pancytopenia - ongoing since early June. Likely related to his immunosuppression. Agree with decrease in immunosuppressive regimen. His valcyte has been stopped.  Will follow his counts.    CMV+ PCR  - likely due to level of immunosuppression and cessation of valcyte. He is high risk being D+/R- and thus would check  CMV DNA PCR weekly while off valcyte    Other ID issues:    Bacterial PPx: on antibiotics for colitis  PJP ppx: off bactrim 2/2 leukopenia  Fungal Ppx: on treatment micafungin currently  SeroStatus: CMV D+/R-, EBV D+/R-  Gamma Globulin Status: last checked in 2011  Immunization Status: not yet done    Seen and discussed with Dr. Francis Dotson D.O.  Infectious Diseases Fellow  [phone]    Attending attestation:  I saw this patient with Dr. Dotson and agree with her findings and plan of care as documented in this edited note.   I personally reviewed vital signs, medications, labs and imaging.     YADY AMIN MD  Infectious Diseases Attending  Pager: [phone]           History of Infectious Disease Illness:   Mr. Bhagat is a 51 y.o. Male with history of HTN and DM that presented to an OSH with acute onset of abdominal pain. He is currently intubated and sedated and thus history is limited and obtained from chart review. He presented with worsening abdominal symptoms and went to [place] ED. He had a CT scan done there showing evidence of colitis. He was transferred to [facility] for further care. He had worsening lactate and there was concern for ischemic gut and thus he was taken to the OR on 7/4/17 for ex-lap and wash out. There was no evidence of ischemic gut per op notes but abdominal wound was left open and he had re-exploration on 7/5 with wound closure. There is no note of diarrhea. He is currently being maintained on a small amount of pressors.       Transplants:  3/4/2017 (Liver), Postoperative day:  124    Past Medical History:   Diagnosis Date     Cancer (H)      Depressive disorder, not elsewhere classified      Esophageal reflux      Fibromyalgia 1/2009    dx with Dr Benitez( Rheum)     History of thrombophlebitis      Osteoarthritis      Other acute embolism veins 11/01    Deep vein thrombophlebitis, filter placed     Other and unspecified hyperlipidemia      Other chronic nonalcoholic liver disease      Fatty liver      Other testicular hypofunction      Pneumonia, organism 10-01    Included ARDS, sepsis, and  acute renal failure; hospitalized     Rheumatoid arthritis(714.0)      Type II or unspecified type diabetes mellitus without mention of complication, not stated as uncontrolled     Managed by endocrinology     Unspecified essential hypertension     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     Unspecified sleep apnea     Uses BiPAP     Past Surgical History:   Procedure Laterality Date     BENCH LIVER N/A 3/4/2017    Procedure: BENCH LIVER;  Surgeon: Jovan Tran MD;  Location: [facility] OR     C NONSPECIFIC PROCEDURE      tracheostomy     C NONSPECIFIC PROCEDURE      repair of deviated septum     C NONSPECIFIC PROCEDURE      Rt knee arthroscopy     C TOTAL KNEE ARTHROPLASTY      Right knee arthroscopy     CHOLECYSTECTOMY       ESOPHAGOSCOPY, GASTROSCOPY, DUODENOSCOPY (EGD), COMBINED N/A 2016    Procedure: COMBINED ESOPHAGOSCOPY, GASTROSCOPY, DUODENOSCOPY (EGD), BIOPSY SINGLE OR MULTIPLE;  Surgeon: Trent Pederson MD;  Location: [facility]     LAPAROTOMY EXPLORATORY N/A 2017    Procedure: LAPAROTOMY EXPLORATORY;  Exploratory Laparotomy, washout;  Surgeon: Tip Zhang MD;  Location: [facility] OR     LAPAROTOMY EXPLORATORY N/A 2017    Procedure: LAPAROTOMY EXPLORATORY;  Exploratory Laparotomy, Washout with closure.;  Surgeon: Tip Zhang MD;  Location: [facility] OR     TRANSPLANT LIVER RECIPIENT  DONOR N/A 3/4/2017    Procedure: TRANSPLANT LIVER RECIPIENT  DONOR;  Surgeon: Jovan Tran MD;  Location: [facility] OR     Family History   Problem Relation Age of Onset     DIABETES Father      Hypertension Father      Substance Abuse Father      Arthritis Mother      CANCER Mother      Thyroid     Thyroid Disease Mother      Other Cancer Mother      Colon Cancer No family hx of      Hyperlipidemia No family hx of      Coronary Artery Disease No family hx of       CEREBROVASCULAR DISEASE No family hx of      Breast Cancer No family hx of      Prostate Cancer No family hx of      Social History     Social History     Marital status:      Spouse name: N/A     Number of children: 1     Years of education: N/A     Occupational History            Social History Main Topics     Smoking status: Former Smoker     Smokeless tobacco: Former User     Types: Chew     Quit date: 10/8/2015      Comment: Has used chewing tobacco since age 15 , chewed for 20yrs      Alcohol use No      Comment: last drink about a year ago (quit 2001)     Drug use: No     Sexual activity: Yes     Partners: Female     Other Topics Concern     Not on file     Social History Narrative    uSED TO [employer]      Back in school now         Immunization History   Administered Date(s) Administered     HepB-Peds 04/14/2016     Influenza (IIV3) 11/22/2002, 11/12/2003, 10/08/2004, 10/10/2005, 11/21/2006, 09/17/2009, 02/07/2014     Influenza Vaccine IM 3yrs+ 4 Valent IIV4 10/05/2015, 10/17/2016     Mantoux 03/01/2004     Pneumococcal (PCV 13) 12/20/2016     Pneumococcal 23 valent 11/21/2006, 10/05/2015     TD (ADULT, 7+) 02/17/2004     TDAP Vaccine (Boostrix) 09/07/2012     Patient Active Problem List   Diagnosis     Carpal tunnel syndrome     Essential hypertension     Personal history of thrombophlebitis     Esophageal reflux     Depressive disorder, not elsewhere classified     Hyperlipidemia     Sleep apnea     Testicular hypofunction     HYPERLIPIDEMIA LDL GOAL <100     Fibromyalgia     OA (osteoarthritis)     Sicca syndrome (H)     Knee pain     Primary localized osteoarthrosis, lower leg     Diabetes mellitus with neurological manifestation (H)     Medication refill- do not delete      Pain medication agreement signed - Shay Kirkpatrick 2/7/14     Hepatocellular carcinoma (H)     Uncontrolled type 2 diabetes mellitus with hyperglycemia, with long-term current use of insulin  (H)     Osteoarthritis     Rheumatoid arthritis (H)     Hyperkalemia     Liver transplant recipient (H)     Acute kidney injury (H)     Immunosuppressed status (H)     Neck pain     Back pain     Sepsis (H)     Typhlitis     Neutropenic colitis (H)            Review of Systems:   Unable to obtain 2/2 clinical condition         Current Medications (antimicrobials listed in bold):       ertapenem (INVanz) IV  1 g Intravenous Q24H     calcium gluconate  1 g Intravenous Once     vancomycin (VANCOCIN) IV  1,750 mg (central catheter) Intravenous Once     filgrastim (NEUPOGEN/GRANIX) intravenous  5 mcg/kg Intravenous Daily at 8 pm     pantoprazole  40 mg Intravenous Q24H     fludrocortisone  0.1 mg Oral Daily     micafungin  100 mg Intravenous Q24H     sodium chloride (PF)  3 mL Intracatheter Q8H     tacrolimus  3 mg Oral QPM     tacrolimus  4 mg Oral QAM     vancomycin place oreilly - receiving intermittent dosing  1 each Does not apply See Admin Instructions     Infusions:    propofol (DIPRIVAN) infusion Stopped (07/06/17 1050)     parenteral nutrition - ADULT compounded formula       NaCl 60 mL/hr at 07/06/17 1300     IV fluid REPLACEMENT ONLY       insulin (regular) Stopped (07/05/17 2200)     parenteral nutrition - ADULT compounded formula 65 mL/hr at 07/05/17 2038     fentaNYL 100 mcg/hr (07/06/17 0622)     norepinephrine 0.04 mcg/kg/min (07/06/17 1316)     IV fluid REPLACEMENT ONLY            Allergies:     Allergies   Allergen Reactions     Erythromycin GI Disturbance     Vioxx      Nausea, vomiting          Physical Exam:   Vitals were reviewed.  All vitals stable.  Patient Vitals for the past 24 hrs:   BP Temp Temp src Heart Rate Resp SpO2 Weight   07/06/17 1330 - - - 104 - 96 % -   07/06/17 1315 - - - 105 - 96 % -   07/06/17 1300 - - - 105 - 100 % -   07/06/17 1245 - - - 105 - 95 % -   07/06/17 1230 - - - 105 - 100 % -   07/06/17 1215 - - - 104 - 97 % -   07/06/17 1200 - 99.6  F (37.6  C) Axillary 104 22 95 % -    07/06/17 1145 - - - 103 - 94 % -   07/06/17 1130 - - - 102 - 94 % -   07/06/17 1115 - - - 104 - - -   07/06/17 1100 - - - 107 - 97 % -   07/06/17 1045 - - - 101 - 98 % -   07/06/17 1030 - - - 104 - - -   07/06/17 1015 - - - 108 - 97 % -   07/06/17 1000 - - - 108 - 97 % -   07/06/17 0945 - - - 107 - 96 % -   07/06/17 0930 - - - 106 - 96 % -   07/06/17 0915 - - - 110 - 98 % -   07/06/17 0900 - - - 106 - 98 % -   07/06/17 0845 - - - 107 - 97 % -   07/06/17 0830 - - - 110 - 97 % -   07/06/17 0823 - - - - - 96 % -   07/06/17 0815 - - - 111 - 95 % -   07/06/17 0800 - 100.4  F (38  C) Axillary 114 24 97 % -   07/06/17 0745 - - - 112 - 98 % -   07/06/17 0730 - - - - - 97 % -   07/06/17 0715 - - - - - 96 % -   07/06/17 0700 - - - 115 - 97 % -   07/06/17 0645 - - - - - 98 % -   07/06/17 0630 - - - - - 99 % -   07/06/17 0625 - - - 105 - 98 % -   07/06/17 0615 - - - 101 - 99 % -   07/06/17 0600 - - - 103 22 98 % -   07/06/17 0545 - - - 103 - 98 % -   07/06/17 0530 - - - 103 - 98 % -   07/06/17 0515 - - - 103 - 98 % -   07/06/17 0500 - - - 102 22 98 % -   07/06/17 0445 - - - 101 - 98 % -   07/06/17 0430 - - - 102 - 98 % -   07/06/17 0415 - - - 99 - 98 % -   07/06/17 0400 - 99.6  F (37.6  C) Axillary 107 22 98 % -   07/06/17 0345 - - - 108 - 98 % -   07/06/17 0330 - - - 107 - 98 % -   07/06/17 0315 - - - 106 - 98 % -   07/06/17 0300 - - - 107 22 98 % -   07/06/17 0245 - - - 106 - 99 % -   07/06/17 0230 - - - 106 - 97 % -   07/06/17 0215 - - - 105 - 97 % -   07/06/17 0200 - - - 104 22 98 % -   07/06/17 0145 - - - 103 - 99 % -   07/06/17 0130 - - - 104 - 100 % -   07/06/17 0115 - 99.2  F (37.3  C) Axillary 110 - 100 % -   07/06/17 0100 - 99.2  F (37.3  C) Axillary 104 22 93 % 97 kg (213 lb 13.5 oz)   07/06/17 0045 - - - 105 - 98 % -   07/06/17 0030 - - - 105 - 99 % -   07/06/17 0015 - - - 105 - 99 % -   07/06/17 0000 - 99.1  F (37.3  C) Axillary 104 24 99 % -   07/05/17 2355 - 99.1  F (37.3  C) Axillary 104 - 100 % -   07/05/17  2345 - - - 101 - 100 % -   07/05/17 2340 - 99.5  F (37.5  C) Axillary 102 - 100 % -   07/05/17 2330 - - - 104 - 100 % -   07/05/17 2315 - - - 102 - 99 % -   07/05/17 2300 - 99.2  F (37.3  C) Axillary 102 22 99 % -   07/05/17 2245 - - - 105 - 98 % -   07/05/17 2240 - 99  F (37.2  C) - 104 25 98 % -   07/05/17 2230 - 99.7  F (37.6  C) - 103 23 98 % -   07/05/17 2215 - - - 101 - 96 % -   07/05/17 2200 - - - 104 22 97 % -   07/05/17 2145 - - - 105 - 98 % -   07/05/17 2130 - - - 103 - 98 % -   07/05/17 2115 - - - 101 - 98 % -   07/05/17 2100 - - - 102 22 98 % -   07/05/17 2045 - - - 100 - 98 % -   07/05/17 2031 - - - - - 99 % -   07/05/17 2030 - - - 98 - 99 % -   07/05/17 2015 - - - 100 - 100 % -   07/05/17 2000 - 98.5  F (36.9  C) Axillary 98 22 100 % -   07/05/17 1945 - - - 96 - 100 % -   07/05/17 1930 - - - 93 - 100 % -   07/05/17 1915 - - - 96 - 100 % -   07/05/17 1908 - 99  F (37.2  C) Axillary 96 - 100 % -   07/05/17 1900 - - - 96 - 100 % -   07/05/17 1858 - 98.8  F (37.1  C) Axillary 98 - 100 % -   07/05/17 1845 - - - 99 - 99 % -   07/05/17 1830 - - - 98 - 100 % -   07/05/17 1815 - - - 98 - 100 % -   07/05/17 1800 - - - 98 23 100 % -   07/05/17 1745 - - - 99 - 100 % -   07/05/17 1730 - - - 98 - 100 % -   07/05/17 1715 - - - 98 - 100 % -   07/05/17 1700 - - - 99 - 100 % -   07/05/17 1645 - - - 101 - 100 % -   07/05/17 1630 - - - 103 - 100 % -   07/05/17 1615 - - - 103 - 100 % -   07/05/17 1600 - 99.3  F (37.4  C) Axillary 101 22 97 % -   07/05/17 1545 108/58 - - 102 - 100 % -   07/05/17 1530 109/57 - - 103 - 100 % -   07/05/17 1515 108/59 - - 102 - 100 % -   07/05/17 1500 112/67 - - 103 - 100 % -   07/05/17 1445 99/49 - - 102 - 100 % -   07/05/17 1430 93/49 - - 101 - 100 % -   07/05/17 1415 (!) 89/55 - - 101 - 100 % -   07/05/17 1400 (!) 89/46 - - 101 - 100 % -     Ranges for his vital signs:  Temp:  [98.5  F (36.9  C)-100.4  F (38  C)] 99.6  F (37.6  C)  Heart Rate:  [] 104  Resp:  [22-25] 22  BP:  ()/(46-67) 108/58  MAP:  [39 mmHg-281 mmHg] 68 mmHg  Arterial Line BP: ()/() 105/47  FiO2 (%):  [30 %-50 %] 30 %  SpO2:  [93 %-100 %] 96 %  Physical Examination:  GENERAL:  well-developed, well-nourished, intubated, sedated  HEENT:  Head is normocephalic, atraumatic   EYES:  Eyes have anicteric sclerae without conjunctival injection or stigmata of endocarditis.    ENT:  ETT in place  NECK:  Supple.   LUNGS:  Clear to auscultation bilateral.   CARDIOVASCULAR:  Regular rate and rhythm with no murmurs  ABDOMEN:  Absent bowel sounds, right sided ex lap wound dress, previous healed liver surgical site c/d/i  SKIN:  No acute rashes.  R IJ in place, cool dusky right metatarsals and right thumb.   NEUROLOGIC:  sedated         Laboratory Data:     CD4 counts  No results found for: ACD4  Inflammatory Markers    Recent Labs   Lab Test  07/05/17   1810  03/05/17   0358  11/23/16   0813  11/16/16   0706  11/15/16   1039  11/07/16   0759  11/03/16   0949  11/01/16   0853   08/15/11   1151  04/11/11   1209  01/03/11   1246  02/15/10   1232   SED   --    --   45*   --    --    --    --    --    --   11  14  17*  25*   CRP  354.0  20.0*  58.0*  59.1*  49.8*  66.0*  140.0*  150.0*   < >  11.1*  9.0*  11.7*  17.5*    < > = values in this interval not displayed.     Immune Globulin Studies  Recent Labs   Lab Test  08/15/11   1243   IGG  1160     Metabolic Studies     Recent Labs   Lab Test  07/06/17   1228  07/06/17   0741  07/06/17   0316  07/06/17   0011  07/05/17   2026  07/05/17   1536  07/05/17   1447  07/05/17   1043  07/05/17   0859   07/05/17   0346  07/04/17 2002   NA   --    --   143  141  144  141   --   142  137   --   140  140   POTASSIUM  4.6  4.9  5.0  5.2  5.6*  6.2*  6.1*  6.2*  6.3*  6.0*   < >  6.6*  5.7*   CHLORIDE   --    --   116*  113*  116*  113*   --   113*   --    --   114*  113*   CO2   --    --   18*  18*  15*  20   --   19*   --    --   15*  16*   ANIONGAP   --    --   9  10   --   8    --   9   --    --   12  10   BUN   --    --   62*  65*  59*  59*   --   53*   --    --   47*  43*   CR   --    --   2.75*  2.78*  2.80*  2.89*  2.79*  2.69*   --    --   2.49*  2.34*   GFRESTIMATED   --    --   24*  24*  24*  23*  24*  25*   --    --   27*  29*   GLC   --    --   139*  115*  119*  166*   --   244*  316*   --   238*  169*   JACOB   --    --   6.9*  6.7*  6.8*  6.7*   --   6.9*   --    --   7.2*  7.2*   PHOS   --    --   5.5*  6.0*  5.4*   --    --    --    --    --   4.8*   --    MAG   --    --   2.1  1.9  2.1   --    --    --    --    --   2.1   --     < > = values in this interval not displayed.     Hepatic Studies  Recent Labs   Lab Test  07/05/17 2026 07/05/17   1043  07/05/17   0346  07/04/17   2002  07/04/17   1402  07/04/17   0735   BILITOTAL  0.7  0.6  0.7  1.0  0.5  0.3   ALKPHOS  29*  37*  38*  42  40  44   ALBUMIN  2.4*  2.4*  2.2*  2.5*  2.3*  1.9*   AST  24  30  42  38  32  17   ALT  16  19  18  18  17  12     Pancreatitis testing  Recent Labs   Lab Test  07/05/17   0346  03/05/17   0358  03/03/17   1458  02/21/17   1520  12/09/16   1159  02/08/16   1144  07/03/12   0710  09/30/10   1734  01/19/10   1353  07/31/09   1254   AMYLASE   --   53  70   --    --    --    --   82   --    --    LIPASE   --   216   --   326  161   --    --   138   --    --    TRIG  174*   --    --    --    --   99  182*   --   681*  231*     Gout Labs    No lab results found.  Hematology Studies    Recent Labs   Lab Test  07/06/17   1228  07/06/17   0741  07/06/17   0316  07/06/17   0011  07/05/17   2026  07/05/17   1810  07/05/17   1536  07/05/17   1043   07/05/17   0346  07/04/17 2002   WBC  0.9*  1.0*  0.8*  1.0*   --   0.8*  0.9*  1.3*   --   1.2*  1.3*   ANEU   --   0.6*  0.3*   --    --   0.3*   --   0.5*   --   0.3*  0.8*   ALYM   --   0.1*  0.2*   --    --   0.2*   --   0.3*   --   0.3*  0.3*   ZINA   --   0.1  0.1   --    --   0.1   --   0.1   --   0.4  0.1   AEOS   --   0.0  0.0   --    --   0.0   --    0.0   --   0.1  0.0   HGB  6.9*  7.3*  7.1*  7.7*  6.9*  7.3*  7.6*  9.3*   < >  9.9*  10.4*   HCT  20.8*  22.1*  21.5*  23.0*   --   22.0*  23.0*  27.7*   --   29.6*  30.6*   MCV  87  87  87  87   --   86  87  87   --   85  85   PLT  22*  27*  27*  34*  24*  32*  36*  54*   --   63*  74*    < > = values in this interval not displayed.     Clotting Studies    Recent Labs   Lab Test  07/06/17   0316  07/06/17   0011  07/05/17   2053  07/05/17   1043   07/04/17 2002   INR  1.80*  1.91*  1.92*  2.37*   < >  1.88*   PTT  47*  48*  52*   --    --   49*    < > = values in this interval not displayed.     Autoimmune Testing   Recent Labs   Lab Test  01/03/11   1246  02/15/10   1232   JESUS MANUEL  <1.0   --    RHF   --   10   ENASSA   --   0   ENASSB   --   0     Arterial Blood Gas Testing  Recent Labs   Lab Test  07/06/17   0015  07/05/17   2026  07/05/17   1810  07/05/17   1536  07/05/17   0859   PH  7.27*  7.27*  7.28*  7.17*  7.14*   PCO2  35  35  37  49*  51*   PO2  109*  155*  188*  180*  57*   HCO3  16*  16*  17*  18*  17*   O2PER  30.0  40  50.0  60  70.0      Thyroid Studies     Recent Labs   Lab Test  02/08/16   1144  04/11/11   1209  02/15/10   1232  07/31/09   1254 04/22/09   TSH  3.35  2.77  2.53  1.95  1.61@   T4   --   1.23  0.90   --    --      Urine Studies  Recent Labs   Lab Test  06/06/17   1605  03/24/17   1315  03/16/17   1010  03/03/17   1405  03/01/17   0340   URINEPH  5.0  5.5  5.0  5.0  5.0   NITRITE  Negative  Negative  Negative  Negative  Negative   LEUKEST  Negative  Negative  Negative  Negative  Negative   WBCU  1  1  5*  0  3*     Vancomycin Levels   Recent Labs   Lab Test  07/06/17   0316  10/28/16   1405   VANCOMYCIN  22.1  14.4     CSF testing   Recent Labs   Lab Test  06/11/09   0225   CWBC  1   CRBC  2   CGLU  104*   CTP  54       Microbiology:  Abdominal fluid Culture - NGTD  BAL - few gm + cocci on gram stain    Virology:  CMV viral loads    Recent Labs   Lab Test  07/03/17   1032   06/26/17   1038   CSPEC  Plasma, EDTA anticoagulant  Plasma  CORRECTED ON 06/26 AT 1421: PREVIOUSLY REPORTED AS WB EDTA       No results found for: CMQNT, CMVQ  EBV viral loads   No lab results found.  No results found for: EBVDN, EBRES, EBVDN, EBVSP, EBVPC, EBVPCR  BK viral loads No lab results found.    Invalid input(s): BKDN, BKVPC, BKVPCR  HSV testing  No lab results found.  Hepatitis B Testing   Recent Labs   Lab Test  03/03/17   1458  02/08/16   1144   HBCAB  Nonreactive  Nonreactive   HEPBANG   --   Nonreactive     Hepatitis C Testing   Hepatitis C Antibody   Date Value Ref Range Status   03/03/2017  NR Final    Nonreactive   Assay performance characteristics have not been established for newborns,   infants, and children     02/08/2016  NR Final    Nonreactive   Assay performance characteristics have not been established for newborns,   infants, and children          Imaging:    XR 1V 7/6/17:  Impression:   1. Low lung volumes with increasing diffuse airspace opacities, right  lung predominant. Worsening pulmonary edema with underlying infection  versus atelectasis.  2. Increased small to moderate layering right-sided pleural effusion  and small left-sided pleural effusion.    US Liver 7/5/17  Impression:   1.  Unremarkable appearance of the transplant liver.  2.  Normal Doppler evaluation of the post transplant liver. Interval  improvement in waveform and resistive indices of the extrahepatic  artery compared to the immediate postoperative ultrasound on 3/5/2017.

## 2017-07-06 NOTE — PLAN OF CARE
Problem: Restraint for Non-Violent/Non-Self-Destructive Behavior  Goal: Prevent/Manage Potential Problems  Maintain safety of patient and others during period of restraint.  Promote psychological and physical wellbeing.  Prevent injury to skin and involved body parts.  Promote nutrition, hydration, and elimination.   Outcome: No Change  Pt in soft, upper extremity wrist restraints. Continues to pull at lines and vent.  Will D/C restraints when D/C criteria is met.

## 2017-07-06 NOTE — CONSULTS
[facility] Internal Medicine Consultation    Camacho Bhagat MRN# [id number]   Age: 51 year old YOB: 1965   Date of Admission: 7/4/2017     Reason for consult: Abnormal labs: Pancytopenia/thrmbocytopenia          Requesting physician Transplant team              Assessment and Plan:   Assessment:  51 year old male with multiple co morbidities and Hx of liver transplant (3/2017) for ESLD and HCC (s/p  TACE x2) due to CASTAÑEDA (March2017) on chronic immunosuppressive therapy who is admitted to hospital for concern for neutropenic colitis and septic shock. He  is now s/p POD1 s/p Exlap and washout with no evidence of ischemic bowel. The hospital course has been complicated by development of severe neutropenia (ANC <500), thrombocytopenia and anemia. Peripheral smear, Total bili here has been unremarkable, and LDH is only mildly elevated. Absolute Retic count is decreased suggesting marrow suppression.  Of note, pt has had thrmobocytopenia for a long time since his ESLD in 2016. Certainly since 6/2017 his counts of all cell lines have been progressively declining while he was on Tacrolimus, mycophenolate, Bactrim and Valgancyclovir. It is not clear if the dosing of these drugs was changed around that time. His current presentation of pancytopenia is multi-factorial from a combination of immunosuppression (mycophenolate), medication (Bactrim, Valgancyclovir) and sepsis related BM suppression. Pt also has received short course of Zosyn which has also been associated with thrombocytopenia. Vancomycin has also been associated with thrombocytopenia with incidence of ~1%. Primary bone marrow failure syndromes like MDS, leukemia are arthur likely. Other D/D to consider are autoimmune causes (Hx of RA), nutritional causes like Vit B12 deficiecny, although less likely. Hopefully the counts should improve with time.     Plan:    -Agree with stopping Mycophenolate, TMP-SMX, Zosyn, Valgancyclovir for now  -Continue  Filgrastim until ANC>1000 for 2 consecutive days  -Continue Broad spectrum ABx as per primary team/transplant ID  -Gaol Hb >7 gm, transfuse as necessary  -Platelet Transfusion not necessary unless Plt count <02038; or 50,000 with active bleeding  -Monitor signs for bleeding  -No indication for BM biopsy at this time      Pt seen and discussed with Dr. Griffin.     Catrachito Drake MD  Hematology Oncology fellow  [phone]            Chief Complaint:     Pancytopenia/thrmbocytopenia     HPI: Camacho Bhagat is a 51 year old male. Hx and ROS is limited due to pt is being intubated and sedated and most of the history are obtained from the chart. Patient is with PMH significant for liver transplant for ESLD and HCC due to CASTAÑEDA (March2017), T2DM on insulin, HTN, HLD, who initially presented to OSH with progressively increasing abdominal pain and fever for 2-3 days. CT scan at OSH showed evidence of colitis on CT scan. He was transferred to [facility] given history of liver transplant and pancytopenia.      Of note, pt has has hx of ESLD and cirrhosis from CASTAÑEDA, and HCC s/p  TACE x2 in 2016. He has had thrombocytopenia (baseline Plt count: 40-70) since 2016 probably related to ESLD. He underwent DDLT in 3/2017. Since then he has been on on Tacrolimus,  Mycophenolate for immunosuppression along with Bactrim and valtrex for prophylaxis.     After admission, pt was taken to OR for exlap and washout with concern for ischemic bowel, however it turned out that there was no ischemia. He was taken to the OR 2nd time for re-exploration and closure. He has been in the ICU and intubated for Severe Septic shock with multi-organ dysfunction requiring pressors and broad spectrum antibiotics.            Past Medical History:     Past Medical History:   Diagnosis Date     Cancer (H)      Depressive disorder, not elsewhere classified      Esophageal reflux      Fibromyalgia 1/2009    dx with Dr Benitez( Rhe[facility])     History of thrombophlebitis       Osteoarthritis      Other acute embolism veins     Deep vein thrombophlebitis, filter placed     Other and unspecified hyperlipidemia      Other chronic nonalcoholic liver disease     Fatty liver      Other testicular hypofunction      Pneumonia, organism 10-01    Included ARDS, sepsis, and  acute renal failure; hospitalized     Rheumatoid arthritis(714.0)      Type II or unspecified type diabetes mellitus without mention of complication, not stated as uncontrolled     Managed by endocrinology     Unspecified essential hypertension     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     Unspecified sleep apnea     Uses BiPAP          Past Surgical History:     Past Surgical History:   Procedure Laterality Date     BENCH LIVER N/A 3/4/2017    Procedure: BENCH LIVER;  Surgeon: Jovan Tran MD;  Location: [facility] OR     C NONSPECIFIC PROCEDURE      tracheostomy     C NONSPECIFIC PROCEDURE      repair of deviated septum     C NONSPECIFIC PROCEDURE      Rt knee arthroscopy     C TOTAL KNEE ARTHROPLASTY      Right knee arthroscopy     CHOLECYSTECTOMY       ESOPHAGOSCOPY, GASTROSCOPY, DUODENOSCOPY (EGD), COMBINED N/A 2016    Procedure: COMBINED ESOPHAGOSCOPY, GASTROSCOPY, DUODENOSCOPY (EGD), BIOPSY SINGLE OR MULTIPLE;  Surgeon: Trent Pederson MD;  Location: [facility]     LAPAROTOMY EXPLORATORY N/A 2017    Procedure: LAPAROTOMY EXPLORATORY;  Exploratory Laparotomy, washout;  Surgeon: Tip Zhang MD;  Location: [facility] OR     LAPAROTOMY EXPLORATORY N/A 2017    Procedure: LAPAROTOMY EXPLORATORY;  Exploratory Laparotomy, Washout with closure.;  Surgeon: Tip Zhang MD;  Location: [facility] OR     TRANSPLANT LIVER RECIPIENT  DONOR N/A 3/4/2017    Procedure: TRANSPLANT LIVER RECIPIENT  DONOR;  Surgeon: Jovan Tran MD;  Location: [facility] OR             Social History:     Social History     Social History     Marital status:      Spouse name: N/A     Number of  children: 1     Years of education: N/A     Occupational History            Social History Main Topics     Smoking status: Former Smoker     Smokeless tobacco: Former User     Types: Chew     Quit date: 10/8/2015      Comment: Has used chewing tobacco since age 15 , chewed for 20yrs      Alcohol use No      Comment: last drink about a year ago (quit 2001)     Drug use: No     Sexual activity: Yes     Partners: Female     Other Topics Concern     Not on file     Social History Narrative    uSED TO BE      Back in school now                 Family History:     Family History   Problem Relation Age of Onset     DIABETES Father      Hypertension Father      Substance Abuse Father      Arthritis Mother      CANCER Mother      Thyroid     Thyroid Disease Mother      Other Cancer Mother      Colon Cancer No family hx of      Hyperlipidemia No family hx of      Coronary Artery Disease No family hx of      CEREBROVASCULAR DISEASE No family hx of      Breast Cancer No family hx of      Prostate Cancer No family hx of              Allergies:        Allergies   Allergen Reactions     Erythromycin GI Disturbance     Vioxx      Nausea, vomiting             Medications:     Current Facility-Administered Medications:      midazolam (VERSED) injection 1-2 mg, 1-2 mg, Intravenous, Q2H PRN, Jermain Link MD, 2 mg at 07/06/17 0305     ertapenem (INVanz) 1 g vial to attach to  mL bag, 1 g, Intravenous, Q24H, Ada Driver PA-C, 1 g at 07/06/17 1049     propofol (DIPRIVAN) infusion, 5-75 mcg/kg/min (Dosing Weight), Intravenous, Continuous, Johnson Hobbs MD, Stopped at 07/06/17 1050     parenteral nutrition - ADULT compounded formula, , CENTRAL LINE IV, TPN CONTINUOUS, Tip Zhang MD     HYDROmorphone (PF) (DILAUDID) injection 0.5 mg, 0.5 mg, Intravenous, Q1H PRN, Johnson Hobbs MD, 0.5 mg at 07/06/17 1702     0.9% sodium chloride infusion, , Intravenous, Continuous, Ajnel  MD Johnson, Last Rate: 60 mL/hr at 07/06/17 1300     dextrose 10 % 1,000 mL infusion, , Intravenous, Continuous PRN, Zainab Eduardo PA-C     insulin 1 unit/mL in saline (NovoLIN, HumuLIN Regular) drip - ADULT IV Infusion, 0-24 Units/hr, Intravenous, Continuous, Zainab Eduardo PA-C, Last Rate: 1 mL/hr at 07/06/17 1700, 1 Units/hr at 07/06/17 1700     glucose 40 % gel 15-30 g, 15-30 g, Oral, Q15 Min PRN **OR** dextrose 50 % injection 25-50 mL, 25-50 mL, Intravenous, Q15 Min PRN **OR** glucagon injection 1 mg, 1 mg, Subcutaneous, Q15 Min PRN, Zainab Eduardo PA-C     filgrastim (NEUPOGEN) 480 mcg in D5W 25 mL infusion, 5 mcg/kg, Intravenous, Daily at 8 pm, Jermain Link MD, 480 mcg at 07/05/17 2010     parenteral nutrition - ADULT compounded formula, , CENTRAL LINE IV, TPN CONTINUOUS, Reshma Albert MD, Last Rate: 65 mL/hr at 07/05/17 2038     pantoprazole (PROTONIX) 40 mg IV push injection, 40 mg, Intravenous, Q24H, QiArden MD, 40 mg at 07/05/17 1905     fludrocortisone (FLORINEF) tablet 0.1 mg, 0.1 mg, Oral, Daily, Cuca Gastelum MD, 0.1 mg at 07/06/17 0822     [DISCONTINUED] prochlorperazine (COMPAZINE) injection 5-10 mg, 5-10 mg, Intravenous, Q6H PRN **OR** [DISCONTINUED] prochlorperazine (COMPAZINE) tablet 5-10 mg, 5-10 mg, Oral, Q6H PRN **OR** prochlorperazine (COMPAZINE) Suppository 25 mg, 25 mg, Rectal, Q12H PRN, Cuca Gastelum MD     lidocaine 1 % 1 mL, 1 mL, Other, Q1H PRN, Eric Dacosta MD, 1 mL at 07/05/17 1233     lidocaine (LMX4) kit, , Topical, Q1H PRN, Eric Dacosta MD     sodium chloride (PF) 0.9% PF flush 3 mL, 3 mL, Intracatheter, Q1H PRN, Eric Dacosta MD     sodium chloride (PF) 0.9% PF flush 3 mL, 3 mL, Intracatheter, Q8H, Eric Dacosta MD, 3 mL at 07/06/17 0507     ondansetron (ZOFRAN-ODT) ODT tab 4 mg, 4 mg, Oral, Q6H PRN **OR** ondansetron (ZOFRAN) injection 4 mg, 4 mg, Intravenous, Q6H PRN, Eric Dacosta MD      prochlorperazine (COMPAZINE) injection 5-10 mg, 5-10 mg, Intravenous, Q6H PRN **OR** prochlorperazine (COMPAZINE) tablet 5-10 mg, 5-10 mg, Oral, Q6H PRN, Eric Dacosta MD     bisacodyl (DULCOLAX) Suppository 10 mg, 10 mg, Rectal, Daily PRN, Johnson Hobbs MD     naloxone (NARCAN) injection 0.1-0.4 mg, 0.1-0.4 mg, Intravenous, Q2 Min PRN, Johnson Hobbs MD     fentaNYL (SUBLIMAZE) infusion, 100-200 mcg/hr, Intravenous, Continuous, Roxi Santoyo MD, Last Rate: 2 mL/hr at 07/06/17 1700, 100 mcg/hr at 07/06/17 1700     acetaminophen (TYLENOL) tablet 650 mg, 650 mg, Oral, Q4H PRN, 650 mg at 07/06/17 0822 **OR** acetaminophen (TYLENOL) Suppository 650 mg, 650 mg, Rectal, Q4H PRN, Johnson Hobbs MD     norepinephrine (LEVOPHED) 16 mg in D5W 250 mL infusion, 0.03-0.4 mcg/kg/min (Dosing Weight), Intravenous, Continuous, Roxi Santoyo MD, Last Rate: 1.8 mL/hr at 07/06/17 1713, 0.02 mcg/kg/min at 07/06/17 1713     tacrolimus (PROGRAF - GENERIC EQUIVALENT) suspension 3 mg, 3 mg, Oral, QPM, Tip Zhang MD, 3 mg at 07/05/17 1827     tacrolimus (PROGRAF - GENERIC EQUIVALENT) suspension 4 mg, 4 mg, Oral, QAM, Reshma Albert MD, 4 mg at 07/06/17 0822     potassium chloride SA (K-DUR/KLOR-CON M) CR tablet 20-40 mEq, 20-40 mEq, Oral, Q2H PRN, Johnson Hobbs MD     potassium chloride (KLOR-CON) Packet 20-40 mEq, 20-40 mEq, Oral or Feeding Tube, Q2H PRN, Johnson Hobbs MD     potassium chloride 10 mEq in 100 mL intermittent infusion, 10 mEq, Intravenous, Q1H PRN, Johnson Hobbs MD     potassium chloride 10 mEq in 100 mL intermittent infusion with 10 mg lidocaine, 10 mEq, Intravenous, Q1H PRN, Johnson Hobbs MD     potassium chloride 20 mEq in 50 mL intermittent infusion, 20 mEq, Intravenous, Q1H PRN, Johnson Hobbs MD     sodium phosphate 10 mmol in D5W intermittent infusion, 10 mmol, Intravenous, Daily PRN, Johnson oHbbs MD     sodium phosphate 15 mmol in D5W intermittent infusion, 15 mmol, Intravenous,  Daily PRN, Johnson Hobbs MD     sodium phosphate 20 mmol in D5W intermittent infusion, 20 mmol, Intravenous, Q6H PRN, Johnson Hobbs MD     sodium phosphate 25 mmol in D5W intermittent infusion, 25 mmol, Intravenous, Q8H PRN, Johnson Hobbs MD     magnesium sulfate 2 g in NS intermittent infusion (PharMEDium or FV Cmpd), 2 g, Intravenous, Daily PRN, Johnson Hobbs MD, 2 g at 07/04/17 1625     magnesium sulfate 4 g in 100 mL sterile water (premade), 4 g, Intravenous, Q4H PRN, Johnson Hobbs MD     dextrose 10 % 1,000 mL infusion, , Intravenous, Continuous PRN, Reshma Albert MD          Review of Systems:   Cannot be obtained    Vital signs:  Temp: 99  F (37.2  C) Temp src: Axillary   Pulse: 101 Heart Rate: 96 Resp: 23 SpO2: 99 % O2 Device: Mechanical Ventilator Oxygen Delivery: 3 LPM Height: 182.9 cm (6') Weight: 97 kg (213 lb 13.5 oz)  Estimated body mass index is 29 kg/(m^2) as calculated from the following:    Height as of this encounter: 1.829 m (6').    Weight as of this encounter: 97 kg (213 lb 13.5 oz).      Exam  Gen: Sedated and intubated, does not follow simple commands  HEENT: MMM, no oral lesions, no pallor, icterus, no obvious petechiae or purpura  Neck: supple,   Lymph: no cervical, sc, axillary LAD   CV: RRR, no murmurs  Resp: CTA  Abd: Soft, wound healthy, no drains  Ext: edema + b/l, some purpal discoloration of toes in the extremities  Neuro: Sedated and intubated, no response to sternal rub, PEERLA, reflexes 2+          Data:     Lab Results   Component Value Date    WBC 0.8 (LL) 07/06/2017    WBC 0.9 (LL) 07/06/2017    WBC 1.0 (L) 07/06/2017    HGB 6.7 (LL) 07/06/2017    HGB 6.9 (LL) 07/06/2017    HGB 7.3 (L) 07/06/2017    HCT 20.2 (L) 07/06/2017    HCT 20.8 (L) 07/06/2017    HCT 22.1 (L) 07/06/2017    PLT 23 (LL) 07/06/2017    PLT 22 (LL) 07/06/2017    PLT 27 (LL) 07/06/2017     (H) 07/06/2017     07/06/2017     07/06/2017    POTASSIUM 4.5 07/06/2017    POTASSIUM 4.6  07/06/2017    POTASSIUM 4.9 07/06/2017    CHLORIDE 117 (H) 07/06/2017    CHLORIDE 116 (H) 07/06/2017    CHLORIDE 113 (H) 07/06/2017    CO2 19 (L) 07/06/2017    CO2 18 (L) 07/06/2017    CO2 18 (L) 07/06/2017    BUN 74 (H) 07/06/2017    BUN 62 (H) 07/06/2017    BUN 65 (H) 07/06/2017    CR 2.78 (H) 07/06/2017    CR 2.75 (H) 07/06/2017    CR 2.78 (H) 07/06/2017     (H) 07/06/2017     (H) 07/06/2017     (H) 07/06/2017    SED 45 (H) 11/23/2016    SED 11 08/15/2011    SED 14 04/11/2011    DD 0.6 (H) 02/28/2008    NTBNPI 69011 (H) 07/05/2017    TROPONIN <0.04 01/10/2007    TROPONIN <0.04 01/10/2007    TROPONIN <0.04 01/09/2007    TROPI <0.012 10/30/2008    TROPI <0.012 10/09/2008    TROPI <0.012 10/09/2008    TROPR Negative 11/04/2006    AST 24 07/05/2017    AST 30 07/05/2017    AST 42 07/05/2017    ALT 16 07/05/2017    ALT 19 07/05/2017    ALT 18 07/05/2017    ALKPHOS 29 (L) 07/05/2017    ALKPHOS 37 (L) 07/05/2017    ALKPHOS 38 (L) 07/05/2017    BILITOTAL 0.7 07/05/2017    BILITOTAL 0.6 07/05/2017    BILITOTAL 0.7 07/05/2017    CLAUDIA  02/28/2017     Unsatisfactory specimen - icteric   Canceled, Test credited  NOTIFIED FLOWER WORTHY RN 6B 2100 2/28/2017 BY ETHAN      CLAUDIA 190 () 02/21/2017    CLAUDIA 227 (HH) 12/09/2016    INR 1.80 (H) 07/06/2017    INR 1.91 (H) 07/06/2017    INR 1.92 (H) 07/05/2017     Results for orders placed or performed during the hospital encounter of 07/04/17   XR Abdomen Port 2 Views    Narrative    EXAM: XR ABDOMEN PORT 2 VW  7/4/2017 7:40 AM      HISTORY: to rule out intestinal perforation and free air    COMPARISON: X-ray 4/12/2017    FINDINGS: Portable AP views of the abdomen obtained. Dilated loop of  bowel in the central abdomen, which appears to contain stool, likely  sigmoid. No evidence of free intraperitoneal air. Mild-to-moderate  colonic stool burden. Surgical clips in the right upper quadrant.  Degenerative changes of the lumbar spine.      Impression    IMPRESSION:    1. No evidence of pneumoperitoneum.  2. Dilated loop of bowel in the central abdomen, likely sigmoid.    I have personally reviewed the examination and initial interpretation  and I agree with the findings.    ROB STARK MD   XR Chest Port 1 View    Narrative    EXAM: XR CHEST PORT 1 VW  7/4/2017 2:13 PM      HISTORY: endotracheal tube positioning    COMPARISON: Chest x-ray 3/6/2017    FINDINGS: Portable AP view of the chest obtained. The endotracheal  tube tip projects 7 cm from the chacorta. Enteric tube courses beyond  the margins of film. Right IJ central line tip projects over the mid  SVC.    The trachea is midline. The cardiac silhouette is within normal  limits. Low lung volumes. Hazy opacification of the costophrenic  angles bilaterally. No pneumothorax. Mild left streaky opacities.      Impression    IMPRESSION:   1. Endotracheal tube tip projects 7 cm from the chacorta.  2. Bilateral pleural effusions with mild overlying opacities, most  likely atelectasis.    I have personally reviewed the examination and initial interpretation  and I agree with the findings.    ROB STARK MD   XR Chest Port 1 View    Narrative    EXAM: XR CHEST PORT 1 VW  7/4/2017 6:48 PM      HISTORY: ET tube placement after 3cm advance    COMPARISON: Chest x-ray from earlier today 7/4/2017 2:06 PM    FINDINGS: Portable AP views of the chest obtained. Endotracheal tube  tip projects 5.3 cm from the chacorta. Right IJ central line tip  projects over the mid SVC. Enteric tube courses beyond the margins of  the film. The trachea is midline. The cardiac silhouette is within  normal limits. Increased hazy opacification of the right hemithorax,  suggestive of underlying pleural fluid. Increased left pleural  effusion. Unchanged patchy bibasilar opacities.      Impression    IMPRESSION:   1. Endotracheal tube tip projects 5.3 cm from the chacorta.  2. Increased bilateral pleural effusions, right greater than left.  3.  Unchanged bibasilar patchy opacities.    I have personally reviewed the examination and initial interpretation  and I agree with the findings.    ROB STARK MD   XR Chest Port 1 View    Narrative    XR CHEST PORT 1 VW  7/5/2017 10:47 AM      HISTORY: follow up     COMPARISON: 7/4/2017    FINDINGS: ET tube tip projects over the distal trachea, approximately  3.8 cm from the chacorta. Right IJ approach intravenous catheter tip  projects over the mid SVC. Enteric tube passes inferior left  hemidiaphragm, tip out of the field of view. Surgical clips project  over the right upper quadrant/right lung base. No pneumothorax.  Bilateral pleural effusions. Bibasilar airspace opacities. Cardiac  silhouette is not enlarged.      Impression    IMPRESSION:   1. Increasingly prominent bilateral pleural effusions.  2. Diffuse patchy airspace opacities, which may represent pulmonary  edema or pneumonia.    I have personally reviewed the examination and initial interpretation  and I agree with the findings.    DAMIEN LIRA MD   US Liver Transplant Portable    Narrative    EXAMINATION: US LIVER TRANSPLANT PORTABLE, 7/5/2017 11:13 AM     COMPARISON: Liver transplant ultrasound 3/5/2017, CT abdomen pelvis  7/4/2017    HISTORY: Post liver transplantation with high lactate. Exploratory  laparotomy 7/5/2017 with post op diagnosis of typhlitis    TECHNIQUE:  Gray-scale, color Doppler and spectral flow analysis.    FINDINGS:   There is trace ascites.    Liver:   The liver demonstrates normal homogeneous echotexture. No  evidence of a focal hepatic mass.     Bile Ducts: Both the intra- and extrahepatic biliary system are of  normal caliber.  The common bile duct measures 3 mm in diameter.    Gallbladder: The gallbladder is surgically absent.    Kidneys:   Right kidney:  The right kidney demonstrates normal echotexture with  no evidence of a shadowing stone, focal mass or hydronephrosis.   13.0  cm in long axis dimension.  Left  kidney:  The left kidney demonstrates normal echotexture with no  evidence of a shadowing stone, focal mass or hydronephrosis.   12.6 cm  in long axis dimension.    Pancreas: The pancreas is obscured by overlying bowel gas.    Spleen:  The spleen is enlarged with perisplenic varices, measuring  19.4 cm. In the sagittal dimension    Visualized portions of the aorta are unremarkable.    LIVER DOPPLER:  Splenic vein:  Patent continuous normal antegrade direction flow  towards the liver, 24 cm/sec.  Extrahepatic portal vein:  Patent continuous antegrade flow, 53  cm/sec.  Portal vein at anastomosis: Patent continuous antegrade flow, 71  cm/sec.  Intrahepatic portal vein:  Patent continuous antegrade flow, 93  cm/sec.  Right portal vein flow is antegrade, measuring 38 cm/sec.  Left portal vein flow is antegrade, measuring 25 cm/sec.    Inferior vena cava: patent with flow toward the heart throughout..  IVC above anastomosis:  104 cm/sec.  IVC at anastomosis:  80 cm/sec.  Intrahepatic IVC:  57 cm/sec.  Extrahepatic IVC:  39 cm/sec.    Right, mid, left hepatic veins: Patent with flow towards the inferior  vena cava.    Extrahepatic hepatic artery: Improved appearance of the extrahepatic  waveforms, now with low-resistance appearance with flow towards the  liver. 109 cm/sec with resistive index 0.76, previously 1.0 on  immediate postoperative liver transplant ultrasound 3/5/2017  Right hepatic artery: 84 cm/sec with resistive index 0.72.  Left hepatic artery: 42 cm/sec with resistive index 0.74.      Impression    Impression:   1.  Unremarkable appearance of the transplant liver.  2.  Normal Doppler evaluation of the post transplant liver. Interval  improvement in waveform and resistive indices of the extrahepatic  artery compared to the immediate postoperative ultrasound on 3/5/2017.    I have personally reviewed the examination and initial interpretation  and I agree with the findings.    DALLIN RENAE MD   XR Chest  Port 1 View    Narrative    Exam: XR CHEST PORT 1 VW, 7/6/2017 9:26 AM    Indication: follow up     Comparison: 7/5/2017.    Findings:   Single AP view the chest. ET tube tip approximately 6.1 cm from the  chacorta, right IJ central venous catheter with tip at the SVC, and  enteric tube with tip outside the field-of-view. Surgical clips over  the right upper quadrant. Stable low lung volumes. Cardiac silhouette  is unchanged in largely obscured. Pulmonary vascular is indistinct.  Increased layering right-sided pleural effusion and stable small  left-sided pleural effusion. Increase in diffuse airspace opacities.  No pneumothorax.      Impression    Impression:   1. Low lung volumes with increasing diffuse airspace opacities, right  lung predominant. Worsening pulmonary edema with underlying infection  versus atelectasis.  2. Increased small to moderate layering right-sided pleural effusion  and small left-sided pleural effusion.    I have personally reviewed the examination and initial interpretation  and I agree with the findings.    DAMIEN LIRA MD     *Note: Due to a large number of results and/or encounters for the requested time period, some results have not been displayed. A complete set of results can be found in Results Review.          Catrachito Drake MD

## 2017-07-06 NOTE — PROGRESS NOTES
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     Graft Function: Good function. US liver normal doppler. LFTs WNL on 7/5. Would repeat levels every 48 hours for graft monitoring.   Immunosuppression Management:   CellCept 250 mg BID. Held since 6/27/17 due to neutropenia. Continue to hold.   Tac goal level 5-8. 7/5 level 5. Continue Prograf 4 mg AM, 3 mg PM dosing. Tac level today is a 7 hr trough level. Please repeat level tomorrow, 10-12 hr trough.   Cardiorespiratory: Intubated for ex lap, has since required pressor support, norepinephrine being titrated off. Moderate right sided pleural effusion. No intervention, monitor.  Vent management per SICU team.   BNP 16,000. Possible due to volume overload. Consider ECHO given hypotension.   Hematology: Pancytopenia, acute on chronic. MMF and bactrim held (since 6/27). Valcyte held on admission  Neupogen 480 mcg given on 7/4 and 7/5 with minimal response. ANC today 300. Consult hematology. CMV seronegative and donor seropositive. 7/3 CMV PCR < 137. Repeat CMV PCR in a few days.   GI: CT from [place] demonstrating right sided colonic inflammation, AXR on admission with dilated loop of bowel in central abdomen, felt likely to sigmoid. Initial impression consistent with Typhlitis however given worsening abdominal pain on examination, hypotension and increasing lactate levels, elected to proceed with exploratory laparotomy. Findings on reexploration demonstrating persistent inflammation of the right colon without evidence of ischemia. Lactic acid now 1.2. Continue NPO. Continue  antibiotics.   Fluid/Electrolytes/Renal: MIVF: per SICU. Recommend to decrease total fluids to 100 cc/hr. Stop albumin replacements. EJ, secondary to hypoperfusion, sepsis. Cr stable today. UO increasing. Nephrology consulting. Hyperkalemia, PTA on florinef. K 4.9.   Endocrine: DM II, on insulin gtt.   Infectious disease: Tmax 99.6, WBC 0.8, ANC 0.3. Started on Zosyn, Vancomycin, Flagyl, Micafungin on admission (7/4/2017). Flagyl stopped. CMV PCR <137 now detectable in serum, plan to repeat CMV 7/10. Holding Valcyte 450 mg given neutropenia. Continue to hold bactrim. Blood cultures obtained 7/4, no growth thus far. Abdominal fluid culture collected intraoperatively, gram stain with no organisms, fungal and bacterial culture, no growth thus far. Lactic acid, improved. Tx ID consulted.   Access: PIV x2, Central Line R IJ, R radial arterial line  Prophylaxis: Ranitidine IV, DVT-SCD  Disposition: SICU    Medical Decision Making: Medium  Admit [id number] (moderate level decision making)    PILLO/Fellow/Resident Provider: Ada Driver PA-C     Faculty: Tip Zhang M.D.    __________________________________________________________________  Transplant History:.  3/4/2017 DD OLT with Dr. Tran (Liver), Postoperative day: 124     Interval History: History is obtained from EMR and nursing staff.  Overnight events: No events overnight. UO increased. Norepinephrine dose decreased.      ROS:   A 10-point review of systems was negative except as noted above.    Meds:    ertapenem (INVanz) IV  1 g Intravenous Q24H     filgrastim (NEUPOGEN/GRANIX) intravenous  5 mcg/kg Intravenous Daily at 8 pm     pantoprazole  40 mg Intravenous Q24H     fludrocortisone  0.1 mg Oral Daily     micafungin  100 mg Intravenous Q24H     sodium chloride (PF)  3 mL Intracatheter Q8H     tacrolimus  3 mg Oral QPM     tacrolimus  4 mg Oral QAM     vancomycin place oreilly - receiving intermittent dosing  1 each Does not apply See Admin Instructions        Physical Exam:     Admit Weight: 94.2 kg (207 lb 11.2 oz) (SCALE 2)    Current vitals:   /58  Temp 100.4  F (38  C) (Axillary)  Resp 24  Ht 1.829 m (6')  Wt 97 kg (213 lb 13.5 oz)  SpO2 97%  BMI 29 kg/m2         Vital sign ranges:    Temp:  [98.1  F (36.7  C)-100.4  F (38  C)] 100.4  F (38  C)  Heart Rate:  [] 108  Resp:  [16-25] 24  BP: ()/(46-67) 108/58  MAP:  [39 mmHg-281 mmHg] 91 mmHg  Arterial Line BP: ()/() 133/68  FiO2 (%):  [30 %-50 %] 30 %  SpO2:  [92 %-100 %] 97 %  Patient Vitals for the past 24 hrs:   BP Temp Temp src Heart Rate Resp SpO2 Weight   07/06/17 1015 - - - 108 - 97 % -   07/06/17 1000 - - - 108 - 97 % -   07/06/17 0945 - - - 107 - 96 % -   07/06/17 0930 - - - 106 - 96 % -   07/06/17 0915 - - - 110 - 98 % -   07/06/17 0900 - - - 106 - 98 % -   07/06/17 0845 - - - 107 - 97 % -   07/06/17 0830 - - - 110 - 97 % -   07/06/17 0823 - - - - - 96 % -   07/06/17 0800 - 100.4  F (38  C) Axillary 114 24 97 % -   07/06/17 0700 - - - 115 - 97 % -   07/06/17 0625 - - - 105 - 98 % -   07/06/17 0615 - - - 101 - 99 % -   07/06/17 0600 - - - 103 22 98 % -   07/06/17 0545 - - - 103 - 98 % -   07/06/17 0530 - - - 103 - 98 % -   07/06/17 0515 - - - 103 - 98 % -   07/06/17 0500 - - - 102 22 98 % -   07/06/17 0445 - - - 101 - 98 % -   07/06/17 0430 - - - 102 - 98 % -   07/06/17 0415 - - - 99 - 98 % -   07/06/17 0400 - 99.6  F (37.6  C) Axillary 107 22 98 % -   07/06/17 0345 - - - 108 - 98 % -   07/06/17 0330 - - - 107 - 98 % -   07/06/17 0315 - - - 106 - 98 % -   07/06/17 0300 - - - 107 22 98 % -   07/06/17 0245 - - - 106 - 99 % -   07/06/17 0230 - - - 106 - 97 % -   07/06/17 0215 - - - 105 - 97 % -   07/06/17 0200 - - - 104 22 98 % -   07/06/17 0145 - - - 103 - 99 % -   07/06/17 0130 - - - 104 - 100 % -   07/06/17 0115 - 99.2  F (37.3  C) Axillary 110 - 100 % -   07/06/17 0100 - 99.2  F (37.3  C) Axillary 104 22 93 % 97 kg (213 lb 13.5 oz)   07/06/17 0045 - - - 105 - 98 % -    07/06/17 0030 - - - 105 - 99 % -   07/06/17 0015 - - - 105 - 99 % -   07/06/17 0000 - 99.1  F (37.3  C) Axillary 104 24 99 % -   07/05/17 2355 - 99.1  F (37.3  C) Axillary 104 - 100 % -   07/05/17 2345 - - - 101 - 100 % -   07/05/17 2340 - 99.5  F (37.5  C) Axillary 102 - 100 % -   07/05/17 2330 - - - 104 - 100 % -   07/05/17 2315 - - - 102 - 99 % -   07/05/17 2300 - 99.2  F (37.3  C) Axillary 102 22 99 % -   07/05/17 2245 - - - 105 - 98 % -   07/05/17 2240 - 99  F (37.2  C) - 104 25 98 % -   07/05/17 2230 - 99.7  F (37.6  C) - 103 23 98 % -   07/05/17 2215 - - - 101 - 96 % -   07/05/17 2200 - - - 104 22 97 % -   07/05/17 2145 - - - 105 - 98 % -   07/05/17 2130 - - - 103 - 98 % -   07/05/17 2115 - - - 101 - 98 % -   07/05/17 2100 - - - 102 22 98 % -   07/05/17 2045 - - - 100 - 98 % -   07/05/17 2031 - - - - - 99 % -   07/05/17 2030 - - - 98 - 99 % -   07/05/17 2015 - - - 100 - 100 % -   07/05/17 2000 - 98.5  F (36.9  C) Axillary 98 22 100 % -   07/05/17 1945 - - - 96 - 100 % -   07/05/17 1930 - - - 93 - 100 % -   07/05/17 1915 - - - 96 - 100 % -   07/05/17 1908 - 99  F (37.2  C) Axillary 96 - 100 % -   07/05/17 1900 - - - 96 - 100 % -   07/05/17 1858 - 98.8  F (37.1  C) Axillary 98 - 100 % -   07/05/17 1845 - - - 99 - 99 % -   07/05/17 1830 - - - 98 - 100 % -   07/05/17 1815 - - - 98 - 100 % -   07/05/17 1800 - - - 98 23 100 % -   07/05/17 1745 - - - 99 - 100 % -   07/05/17 1730 - - - 98 - 100 % -   07/05/17 1715 - - - 98 - 100 % -   07/05/17 1700 - - - 99 - 100 % -   07/05/17 1645 - - - 101 - 100 % -   07/05/17 1630 - - - 103 - 100 % -   07/05/17 1615 - - - 103 - 100 % -   07/05/17 1600 - 99.3  F (37.4  C) Axillary 101 22 97 % -   07/05/17 1545 108/58 - - 102 - 100 % -   07/05/17 1530 109/57 - - 103 - 100 % -   07/05/17 1515 108/59 - - 102 - 100 % -   07/05/17 1500 112/67 - - 103 - 100 % -   07/05/17 1445 99/49 - - 102 - 100 % -   07/05/17 1430 93/49 - - 101 - 100 % -   07/05/17 1415 (!) 89/55 - - 101 - 100 % -    07/05/17 1400 (!) 89/46 - - 101 - 100 % -   07/05/17 1345 (!) 80/48 - - 99 - 100 % -   07/05/17 1330 (!) 88/52 - - 98 - 100 % -   07/05/17 1315 94/53 - - 100 - 100 % -   07/05/17 1300 104/59 - - 100 - 100 % -   07/05/17 1245 98/63 - - 100 - 100 % -   07/05/17 1230 107/62 - - 100 - 100 % -   07/05/17 1215 106/61 - - 101 - 100 % -   07/05/17 1200 109/61 98.1  F (36.7  C) Axillary 101 21 100 % -   07/05/17 1145 109/58 - - 101 - 100 % -   07/05/17 1130 105/56 - - 101 - 100 % -   07/05/17 1115 102/54 - - 100 - 100 % -   07/05/17 1100 110/60 - - 99 16 99 % -   07/05/17 1045 113/59 - - 100 - 92 % -   07/05/17 1030 107/56 - - 101 - 97 % -     General Appearance: sedated   Skin: normal, warm, dry  Heart: ST, normal S1 and S2. B/l feet warm, b/l toes cool, dusky  Lungs: Vented  Abdomen: The abdomen is soft, midline incision is c/d/i and covered. Incision from prior Liver transplant is noted and well healed.   : vazquez is present, yellow urine in bag.   Extremities:BLE trace edema.  Neurologic: sedated.     Data:   CMP    Recent Labs  Lab 07/06/17  0741 07/06/17  0316 07/06/17  0011 07/05/17  2026  07/05/17  1043 07/05/17  0859   NA  --  143 141 144  < > 142 137   POTASSIUM 4.9 5.0 5.2 5.6*  < > 6.3* 6.0*   CHLORIDE  --  116* 113* 116*  < > 113*  --    CO2  --  18* 18* 15*  < > 19*  --    GLC  --  139* 115* 119*  < > 244* 316*   BUN  --  62* 65* 59*  < > 53*  --    CR  --  2.75* 2.78* 2.80*  < > 2.69*  --    GFRESTIMATED  --  24* 24* 24*  < > 25*  --    GFRESTBLACK  --  29* 29* 29*  < > 30*  --    JACOB  --  6.9* 6.7* 6.8*  < > 6.9*  --    ICAW 4.2*  --   --   --   --   --  4.4   MAG  --  2.1 1.9 2.1  --   --   --    PHOS  --  5.5* 6.0* 5.4*  --   --   --    ALBUMIN  --   --   --  2.4*  --  2.4*  --    BILITOTAL  --   --   --  0.7  --  0.6  --    ALKPHOS  --   --   --  29*  --  37*  --    AST  --   --   --  24  --  30  --    ALT  --   --   --  16  --  19  --    < > = values in this interval not displayed.  CBC    Recent  Labs  Lab 07/06/17  0741 07/06/17  0316   HGB 7.3* 7.1*   WBC 1.0* 0.8*   PLT 27* 27*   A1C  --  Canceled, Test credited Below Assay RangeNOTIFIED ADELSOFERNANDO ONEILL AT 0538 ON 7/6/17 BY CHRISSY CRANE    Recent Labs  Lab 07/06/17  0316 07/06/17  0011   INR 1.80* 1.91*   PTT 47* 48*      Urinalysis  Recent Labs   Lab Test  06/14/17   1508  06/06/17   1605  03/24/17   1315   04/11/16   1345   COLOR   --   Yellow  Yellow   < >  Yellow   APPEARANCE   --   Slightly Cloudy  Clear   < >  Clear   URINEGLC   --   50*  Negative   < >  30*   URINEBILI   --   Negative  Negative   < >  Negative   URINEKETONE   --   Negative  Negative   < >  Negative   SG   --   1.015  1.009   < >  1.016   UBLD   --   Negative  Negative   < >  Small*   URINEPH   --   5.0  5.5   < >  5.0   PROTEIN   --   100*  Negative   < >  30*   NITRITE   --   Negative  Negative   < >  Negative   LEUKEST   --   Negative  Negative   < >  Negative   RBCU   --   2  <1   < >  1   WBCU   --   1  1   < >  1   UTPG  1.55*   --    --    --   0.41*    < > = values in this interval not displayed.       Cultures:   Blood Cultures x2 7/4/2017 Pending     Abdominal Fluid Culture 7/4/2017: Pending     Abdominal Fluid Culture 7/5/17: Pending    CT scan:    7/4/2017 CONCLUSION:   1. Right colonic wall thickening suggesting colitis. Follow-up is necessary to confirm resolution in order to exclude colonic mass.  2. Transverse colon is not well distended reducing evaluation for wall thickening.  3. Small amount of ascites.  4. Splenomegaly.  5. Prominent portal and splenic veins. If confirmation of vascular patency is indicated consider follow-up ultrasound of the liver with Doppler.  6. Small right pleural effusion.  7. Stranding in the subcutaneous fat of the ventral pelvis.     Abdominal XR 7/4/2017:   1. No evidence of pneumoperitoneum.  2. Dilated loop of bowel in the central abdomen, likely sigmoid.    XR Chest Portable 1 View 7/4/17:  1. Endotracheal tube tip projects 5.3 cm  from the chacorta.  2. Increased bilateral pleural effusions, right greater than left.  3. Unchanged bibasilar patchy opacities.

## 2017-07-07 ENCOUNTER — APPOINTMENT (OUTPATIENT)
Dept: GENERAL RADIOLOGY | Facility: CLINIC | Age: 52
End: 2017-07-07
Attending: TRANSPLANT SURGERY
Payer: COMMERCIAL

## 2017-07-07 LAB
ABO + RH BLD: NORMAL
ABO + RH BLD: NORMAL
ALBUMIN SERPL-MCNC: 2.2 G/DL (ref 3.4–5)
ALP SERPL-CCNC: 35 U/L (ref 40–150)
ALT SERPL W P-5'-P-CCNC: 16 U/L (ref 0–70)
AMMONIA PLAS-SCNC: <10 UMOL/L (ref 10–50)
ANION GAP SERPL CALCULATED.3IONS-SCNC: 7 MMOL/L (ref 3–14)
ANION GAP SERPL CALCULATED.3IONS-SCNC: 9 MMOL/L (ref 3–14)
ANION GAP SERPL CALCULATED.3IONS-SCNC: 9 MMOL/L (ref 3–14)
AST SERPL W P-5'-P-CCNC: 25 U/L (ref 0–45)
BACTERIA SPEC CULT: ABNORMAL
BACTERIA SPEC CULT: NORMAL
BASOPHILS # BLD AUTO: 0 10E9/L (ref 0–0.2)
BASOPHILS # BLD AUTO: 0 10E9/L (ref 0–0.2)
BASOPHILS NFR BLD AUTO: 0 %
BASOPHILS NFR BLD AUTO: 0 %
BILIRUB DIRECT SERPL-MCNC: 0.4 MG/DL (ref 0–0.2)
BILIRUB SERPL-MCNC: 0.6 MG/DL (ref 0.2–1.3)
BLD GP AB SCN SERPL QL: NORMAL
BLOOD BANK CMNT PATIENT-IMP: NORMAL
BUN SERPL-MCNC: 76 MG/DL (ref 7–30)
BUN SERPL-MCNC: 83 MG/DL (ref 7–30)
BUN SERPL-MCNC: 90 MG/DL (ref 7–30)
BURR CELLS BLD QL SMEAR: SLIGHT
CALCIUM SERPL-MCNC: 7.6 MG/DL (ref 8.5–10.1)
CALCIUM SERPL-MCNC: 8 MG/DL (ref 8.5–10.1)
CALCIUM SERPL-MCNC: 8 MG/DL (ref 8.5–10.1)
CHLORIDE SERPL-SCNC: 116 MMOL/L (ref 94–109)
CHLORIDE SERPL-SCNC: 116 MMOL/L (ref 94–109)
CHLORIDE SERPL-SCNC: 117 MMOL/L (ref 94–109)
CO2 SERPL-SCNC: 19 MMOL/L (ref 20–32)
CO2 SERPL-SCNC: 20 MMOL/L (ref 20–32)
CO2 SERPL-SCNC: 20 MMOL/L (ref 20–32)
COPATH REPORT: NORMAL
CREAT SERPL-MCNC: 2.61 MG/DL (ref 0.66–1.25)
CREAT SERPL-MCNC: 2.61 MG/DL (ref 0.66–1.25)
CREAT SERPL-MCNC: 2.71 MG/DL (ref 0.66–1.25)
DIFFERENTIAL METHOD BLD: ABNORMAL
DIFFERENTIAL METHOD BLD: ABNORMAL
EOSINOPHIL # BLD AUTO: 0 10E9/L (ref 0–0.7)
EOSINOPHIL # BLD AUTO: 0 10E9/L (ref 0–0.7)
EOSINOPHIL NFR BLD AUTO: 2.6 %
EOSINOPHIL NFR BLD AUTO: 3 %
ERYTHROCYTE [DISTWIDTH] IN BLOOD BY AUTOMATED COUNT: 15.3 % (ref 10–15)
ERYTHROCYTE [DISTWIDTH] IN BLOOD BY AUTOMATED COUNT: 15.3 % (ref 10–15)
ERYTHROCYTE [DISTWIDTH] IN BLOOD BY AUTOMATED COUNT: 15.5 % (ref 10–15)
GFR SERPL CREATININE-BSD FRML MDRD: 25 ML/MIN/1.7M2
GFR SERPL CREATININE-BSD FRML MDRD: 26 ML/MIN/1.7M2
GFR SERPL CREATININE-BSD FRML MDRD: 26 ML/MIN/1.7M2
GLUCOSE BLDC GLUCOMTR-MCNC: 103 MG/DL (ref 70–99)
GLUCOSE BLDC GLUCOMTR-MCNC: 103 MG/DL (ref 70–99)
GLUCOSE BLDC GLUCOMTR-MCNC: 110 MG/DL (ref 70–99)
GLUCOSE BLDC GLUCOMTR-MCNC: 110 MG/DL (ref 70–99)
GLUCOSE BLDC GLUCOMTR-MCNC: 115 MG/DL (ref 70–99)
GLUCOSE BLDC GLUCOMTR-MCNC: 124 MG/DL (ref 70–99)
GLUCOSE BLDC GLUCOMTR-MCNC: 130 MG/DL (ref 70–99)
GLUCOSE BLDC GLUCOMTR-MCNC: 138 MG/DL (ref 70–99)
GLUCOSE BLDC GLUCOMTR-MCNC: 141 MG/DL (ref 70–99)
GLUCOSE BLDC GLUCOMTR-MCNC: 141 MG/DL (ref 70–99)
GLUCOSE BLDC GLUCOMTR-MCNC: 142 MG/DL (ref 70–99)
GLUCOSE BLDC GLUCOMTR-MCNC: 143 MG/DL (ref 70–99)
GLUCOSE BLDC GLUCOMTR-MCNC: 158 MG/DL (ref 70–99)
GLUCOSE SERPL-MCNC: 129 MG/DL (ref 70–99)
GLUCOSE SERPL-MCNC: 135 MG/DL (ref 70–99)
GLUCOSE SERPL-MCNC: 142 MG/DL (ref 70–99)
HCT VFR BLD AUTO: 21.9 % (ref 40–53)
HCT VFR BLD AUTO: 22.6 % (ref 40–53)
HCT VFR BLD AUTO: 23 % (ref 40–53)
HGB BLD-MCNC: 7.3 G/DL (ref 13.3–17.7)
HGB BLD-MCNC: 7.4 G/DL (ref 13.3–17.7)
HGB BLD-MCNC: 7.5 G/DL (ref 13.3–17.7)
LACTATE BLD-SCNC: 1.2 MMOL/L (ref 0.7–2.1)
LACTATE BLD-SCNC: 1.2 MMOL/L (ref 0.7–2.1)
LYMPHOCYTES # BLD AUTO: 0.2 10E9/L (ref 0.8–5.3)
LYMPHOCYTES # BLD AUTO: 0.3 10E9/L (ref 0.8–5.3)
LYMPHOCYTES NFR BLD AUTO: 14.5 %
LYMPHOCYTES NFR BLD AUTO: 28 %
MAGNESIUM SERPL-MCNC: 2.2 MG/DL (ref 1.6–2.3)
MCH RBC QN AUTO: 28.4 PG (ref 26.5–33)
MCH RBC QN AUTO: 28.5 PG (ref 26.5–33)
MCH RBC QN AUTO: 29.1 PG (ref 26.5–33)
MCHC RBC AUTO-ENTMCNC: 32.6 G/DL (ref 31.5–36.5)
MCHC RBC AUTO-ENTMCNC: 32.7 G/DL (ref 31.5–36.5)
MCHC RBC AUTO-ENTMCNC: 33.3 G/DL (ref 31.5–36.5)
MCV RBC AUTO: 87 FL (ref 78–100)
METAMYELOCYTES # BLD: 0.1 10E9/L
METAMYELOCYTES # BLD: 0.2 10E9/L
METAMYELOCYTES NFR BLD MANUAL: 13 %
METAMYELOCYTES NFR BLD MANUAL: 6 %
MICRO REPORT STATUS: ABNORMAL
MICRO REPORT STATUS: NORMAL
MICROORGANISM SPEC CULT: ABNORMAL
MONOCYTES # BLD AUTO: 0.2 10E9/L (ref 0–1.3)
MONOCYTES # BLD AUTO: 0.3 10E9/L (ref 0–1.3)
MONOCYTES NFR BLD AUTO: 17 %
MONOCYTES NFR BLD AUTO: 17.9 %
MYELOCYTES # BLD: 0 10E9/L
MYELOCYTES # BLD: 0.1 10E9/L
MYELOCYTES NFR BLD MANUAL: 0.9 %
MYELOCYTES NFR BLD MANUAL: 5 %
NEUTROPHILS # BLD AUTO: 0.4 10E9/L (ref 1.6–8.3)
NEUTROPHILS # BLD AUTO: 1 10E9/L (ref 1.6–8.3)
NEUTROPHILS NFR BLD AUTO: 34 %
NEUTROPHILS NFR BLD AUTO: 58.1 %
NRBC # BLD AUTO: 0 10*3/UL
NRBC BLD AUTO-RTO: 3 /100
PHOSPHATE SERPL-MCNC: 3.7 MG/DL (ref 2.5–4.5)
PLATELET # BLD AUTO: 27 10E9/L (ref 150–450)
PLATELET # BLD AUTO: 28 10E9/L (ref 150–450)
PLATELET # BLD AUTO: 32 10E9/L (ref 150–450)
PLATELET # BLD EST: ABNORMAL 10*3/UL
POIKILOCYTOSIS BLD QL SMEAR: SLIGHT
POTASSIUM SERPL-SCNC: 3.6 MMOL/L (ref 3.4–5.3)
POTASSIUM SERPL-SCNC: 3.9 MMOL/L (ref 3.4–5.3)
POTASSIUM SERPL-SCNC: 4 MMOL/L (ref 3.4–5.3)
PROT SERPL-MCNC: 4.7 G/DL (ref 6.8–8.8)
RBC # BLD AUTO: 2.51 10E12/L (ref 4.4–5.9)
RBC # BLD AUTO: 2.6 10E12/L (ref 4.4–5.9)
RBC # BLD AUTO: 2.64 10E12/L (ref 4.4–5.9)
SODIUM SERPL-SCNC: 144 MMOL/L (ref 133–144)
SODIUM SERPL-SCNC: 144 MMOL/L (ref 133–144)
SODIUM SERPL-SCNC: 145 MMOL/L (ref 133–144)
SPECIMEN EXP DATE BLD: NORMAL
SPECIMEN SOURCE: ABNORMAL
SPECIMEN SOURCE: NORMAL
TACROLIMUS BLD-MCNC: 6.8 UG/L (ref 5–15)
TME LAST DOSE: NORMAL H
WBC # BLD AUTO: 1.2 10E9/L (ref 4–11)
WBC # BLD AUTO: 1.2 10E9/L (ref 4–11)
WBC # BLD AUTO: 1.7 10E9/L (ref 4–11)

## 2017-07-07 PROCEDURE — 25000132 ZZH RX MED GY IP 250 OP 250 PS 637

## 2017-07-07 PROCEDURE — 25000125 ZZHC RX 250: Performed by: PHYSICIAN ASSISTANT

## 2017-07-07 PROCEDURE — 25000125 ZZHC RX 250: Performed by: STUDENT IN AN ORGANIZED HEALTH CARE EDUCATION/TRAINING PROGRAM

## 2017-07-07 PROCEDURE — 80197 ASSAY OF TACROLIMUS: CPT | Performed by: PHYSICIAN ASSISTANT

## 2017-07-07 PROCEDURE — 00000146 ZZHCL STATISTIC GLUCOSE BY METER IP

## 2017-07-07 PROCEDURE — 25000128 H RX IP 250 OP 636: Performed by: SURGERY

## 2017-07-07 PROCEDURE — 86850 RBC ANTIBODY SCREEN: CPT | Performed by: SURGERY

## 2017-07-07 PROCEDURE — 25000131 ZZH RX MED GY IP 250 OP 636 PS 637: Performed by: SURGERY

## 2017-07-07 PROCEDURE — 80048 BASIC METABOLIC PNL TOTAL CA: CPT | Performed by: PHYSICIAN ASSISTANT

## 2017-07-07 PROCEDURE — 40000275 ZZH STATISTIC RCP TIME EA 10 MIN

## 2017-07-07 PROCEDURE — 25000128 H RX IP 250 OP 636: Performed by: NURSE PRACTITIONER

## 2017-07-07 PROCEDURE — 83735 ASSAY OF MAGNESIUM: CPT | Performed by: SURGERY

## 2017-07-07 PROCEDURE — 94003 VENT MGMT INPAT SUBQ DAY: CPT

## 2017-07-07 PROCEDURE — 40000014 ZZH STATISTIC ARTERIAL MONITORING DAILY

## 2017-07-07 PROCEDURE — 25000128 H RX IP 250 OP 636

## 2017-07-07 PROCEDURE — 25000128 H RX IP 250 OP 636: Performed by: STUDENT IN AN ORGANIZED HEALTH CARE EDUCATION/TRAINING PROGRAM

## 2017-07-07 PROCEDURE — 82140 ASSAY OF AMMONIA: CPT | Performed by: TRANSPLANT SURGERY

## 2017-07-07 PROCEDURE — 85025 COMPLETE CBC W/AUTO DIFF WBC: CPT | Performed by: PHYSICIAN ASSISTANT

## 2017-07-07 PROCEDURE — 84100 ASSAY OF PHOSPHORUS: CPT | Performed by: SURGERY

## 2017-07-07 PROCEDURE — 40000196 ZZH STATISTIC RAPCV CVP MONITORING

## 2017-07-07 PROCEDURE — 80076 HEPATIC FUNCTION PANEL: CPT | Performed by: SURGERY

## 2017-07-07 PROCEDURE — 25000128 H RX IP 250 OP 636: Performed by: PHYSICIAN ASSISTANT

## 2017-07-07 PROCEDURE — 71010 XR CHEST PORT 1 VW: CPT

## 2017-07-07 PROCEDURE — 83605 ASSAY OF LACTIC ACID: CPT | Performed by: TRANSPLANT SURGERY

## 2017-07-07 PROCEDURE — 99291 CRITICAL CARE FIRST HOUR: CPT | Mod: GC | Performed by: SURGERY

## 2017-07-07 PROCEDURE — 86900 BLOOD TYPING SEROLOGIC ABO: CPT | Performed by: SURGERY

## 2017-07-07 PROCEDURE — 86901 BLOOD TYPING SEROLOGIC RH(D): CPT | Performed by: SURGERY

## 2017-07-07 PROCEDURE — 20000004 ZZH R&B ICU UMMC

## 2017-07-07 PROCEDURE — 85025 COMPLETE CBC W/AUTO DIFF WBC: CPT | Performed by: SURGERY

## 2017-07-07 PROCEDURE — 25000125 ZZHC RX 250: Performed by: TRANSPLANT SURGERY

## 2017-07-07 PROCEDURE — 80048 BASIC METABOLIC PNL TOTAL CA: CPT | Performed by: SURGERY

## 2017-07-07 PROCEDURE — 25000131 ZZH RX MED GY IP 250 OP 636 PS 637: Performed by: TRANSPLANT SURGERY

## 2017-07-07 RX ORDER — FUROSEMIDE 10 MG/ML
20 INJECTION INTRAMUSCULAR; INTRAVENOUS ONCE
Status: COMPLETED | OUTPATIENT
Start: 2017-07-07 | End: 2017-07-07

## 2017-07-07 RX ORDER — FUROSEMIDE 10 MG/ML
40 INJECTION INTRAMUSCULAR; INTRAVENOUS ONCE
Status: COMPLETED | OUTPATIENT
Start: 2017-07-07 | End: 2017-07-07

## 2017-07-07 RX ORDER — FUROSEMIDE 10 MG/ML
INJECTION INTRAMUSCULAR; INTRAVENOUS
Status: COMPLETED
Start: 2017-07-07 | End: 2017-07-07

## 2017-07-07 RX ADMIN — FUROSEMIDE 40 MG: 10 INJECTION, SOLUTION INTRAVENOUS at 19:40

## 2017-07-07 RX ADMIN — FUROSEMIDE 20 MG: 10 INJECTION, SOLUTION INTRAVENOUS at 13:39

## 2017-07-07 RX ADMIN — PANTOPRAZOLE SODIUM 40 MG: 40 INJECTION, POWDER, FOR SOLUTION INTRAVENOUS at 19:42

## 2017-07-07 RX ADMIN — ERTAPENEM SODIUM 1 G: 1 INJECTION, POWDER, LYOPHILIZED, FOR SOLUTION INTRAMUSCULAR; INTRAVENOUS at 09:58

## 2017-07-07 RX ADMIN — FENTANYL CITRATE 50 MCG: 50 INJECTION, SOLUTION INTRAMUSCULAR; INTRAVENOUS at 08:50

## 2017-07-07 RX ADMIN — FUROSEMIDE 20 MG: 10 INJECTION, SOLUTION INTRAVENOUS at 11:39

## 2017-07-07 RX ADMIN — FENTANYL CITRATE 150 MCG/HR: 50 INJECTION, SOLUTION INTRAMUSCULAR; INTRAVENOUS at 08:50

## 2017-07-07 RX ADMIN — FENTANYL CITRATE 50 MCG: 50 INJECTION, SOLUTION INTRAMUSCULAR; INTRAVENOUS at 13:46

## 2017-07-07 RX ADMIN — MAGNESIUM SULFATE HEPTAHYDRATE: 500 INJECTION, SOLUTION INTRAMUSCULAR; INTRAVENOUS at 19:41

## 2017-07-07 RX ADMIN — FLUDROCORTISONE ACETATE 0.1 MG: 0.1 TABLET ORAL at 07:54

## 2017-07-07 RX ADMIN — MIDAZOLAM 2 MG: 1 INJECTION INTRAMUSCULAR; INTRAVENOUS at 08:52

## 2017-07-07 RX ADMIN — TACROLIMUS 4 MG: 5 CAPSULE ORAL at 07:54

## 2017-07-07 RX ADMIN — FENTANYL CITRATE 50 MCG: 50 INJECTION, SOLUTION INTRAMUSCULAR; INTRAVENOUS at 11:55

## 2017-07-07 RX ADMIN — TACROLIMUS 3 MG: 5 CAPSULE ORAL at 17:43

## 2017-07-07 RX ADMIN — FENTANYL CITRATE 50 MCG: 50 INJECTION, SOLUTION INTRAMUSCULAR; INTRAVENOUS at 17:42

## 2017-07-07 RX ADMIN — FUROSEMIDE 20 MG: 10 INJECTION INTRAMUSCULAR; INTRAVENOUS at 13:39

## 2017-07-07 RX ADMIN — FENTANYL CITRATE 150 MCG/HR: 50 INJECTION, SOLUTION INTRAMUSCULAR; INTRAVENOUS at 18:02

## 2017-07-07 RX ADMIN — HUMAN INSULIN 1 UNITS/HR: 100 INJECTION, SOLUTION SUBCUTANEOUS at 17:46

## 2017-07-07 RX ADMIN — I.V. FAT EMULSION 250 ML: 20 EMULSION INTRAVENOUS at 19:41

## 2017-07-07 RX ADMIN — FENTANYL CITRATE 50 MCG: 50 INJECTION, SOLUTION INTRAMUSCULAR; INTRAVENOUS at 06:31

## 2017-07-07 RX ADMIN — FILGRASTIM 480 MCG: 480 INJECTION, SOLUTION INTRAVENOUS; SUBCUTANEOUS at 19:40

## 2017-07-07 RX ADMIN — MIDAZOLAM 1 MG: 1 INJECTION INTRAMUSCULAR; INTRAVENOUS at 06:57

## 2017-07-07 RX ADMIN — FENTANYL CITRATE 50 MCG: 50 INJECTION, SOLUTION INTRAMUSCULAR; INTRAVENOUS at 00:56

## 2017-07-07 RX ADMIN — FENTANYL CITRATE 50 MCG: 50 INJECTION, SOLUTION INTRAMUSCULAR; INTRAVENOUS at 07:59

## 2017-07-07 NOTE — PROGRESS NOTES
[facility]  Transplant Infectious Disease Progress Note     Patient:  Camacho Bhagat, Date of birth 1965, Medical record number [id number]  Date of Visit:  07/07/2017         Assessment and Recommendations:   Recommendations:  - discontinue ertapenem  - resume zosyn - he had previous thrombocytopenia (not as a result of zosyn) and would not expect worsening with resumption of zosyn  - if patient is having diarrhea, please check CDiff PCR  - follow weekly CMV PCR while off valcyte  - would not treat VRE in sputum - with his low oxygen requirements and unchanged CXR would hold on antibiotics. No clear evidence of aspiration pneumonia.   - agree with decrease in immunosuppression  - will follow cultures    Transplant Infectious Disease will continue to follow with you.    Assessment: 51 y.o. 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 He is now s/p ex-lap and washout with wound closure. We are asked to comment on antibiotics and ongoing workup.     Colitis - he developed acute onset of pain on Friday. He is s/p ex-lap on 7/4 showing inflamed cecum and ascending colon. He had wound closure on 7/5. Cultures from intra-op are no growth so far. Unclear if he has been having diarrhea but would send for C. Diff testing. In terms of antibiotic selection, zosyn alone should be sufficient in covering anaerobes and enteric gram negatives. His pancytopenia pre-existed zosyn administration and pancytopenia is a very uncommon occurrence with zosyn.      Pancytopenia - ongoing since early June. Likely related to his immunosuppression. Heme/onc now following. Agree with decrease in immunosuppressive regimen. His valcyte has been stopped.  Will follow his counts.     CMV+ PCR  - likely due to level of immunosuppression and cessation of valcyte. He is high risk being D+/R- and thus would check CMV DNA PCR weekly while off  valcyte    VRE in sputum - isolated from BAL. He otherwise has had minimal Oxygen requirements and unchanged imaging. He is colonized with VRE (rectal swab). Would not start Linezolid at this juncture as this would cause more cytopenia. If inclined to treat, would favor tigecycline over synercid.      Other ID issues:  Bacterial PPx: on antibiotics for colitis  PJP ppx: off bactrim 2/2 leukopenia  Fungal Ppx: on treatment micafungin currently  SeroStatus: CMV D+/R-, EBV D+/R-  Gamma Globulin Status: last checked in 2011  Immunization Status: not yet done     Discussed with Dr. Francis Dotson D.O.  Infectious Diseases Fellow    Attending attestation:  I personally examined and evaluated this patient.  I discussed the patient with Dr. Dotson, and agree with the assessment and plan of care as documented her note.  I personally reviewed vital signs, medications, labs and imaging. No need to treat VRE in BAL since no compelling evidence of pneumonia. Thrombocytopenia has developed long ago (plts started to trend down at the beginning of June) predating the use of zosyn. Will favor zosyn over ertapenem.   YADY AMIN MD  Infectious Diseases Attending  Pager: [phone]        Interval History:   Continues to be intubated and sedated. No overnight events. Small mucoid stools per RN. No diarrhea. Failed PS trial today due to mental status. Hemodynamically stable.       Transplants:  3/4/2017 (Liver), Postoperative day:  125.  Coordinator Abril Doty    Review of Systems:  unable to obtain 2/2 clinical condition          Current Medications & Allergies:       ertapenem (INVanz) IV  1 g Intravenous Q24H     lipids  250 mL Intravenous Q24H     filgrastim (NEUPOGEN/GRANIX) intravenous  5 mcg/kg Intravenous Daily at 8 pm     pantoprazole  40 mg Intravenous Q24H     fludrocortisone  0.1 mg Oral Daily     sodium chloride (PF)  3 mL Intracatheter Q8H     tacrolimus  3 mg Oral QPM     tacrolimus  4 mg Oral QAM        Infusions/Drips:    propofol (DIPRIVAN) infusion Stopped (07/06/17 1050)     parenteral nutrition - ADULT compounded formula 65 mL/hr at 07/07/17 0800     NaCl 10 mL/hr at 07/07/17 0900     IV fluid REPLACEMENT ONLY       insulin (regular) 2 Units/hr (07/07/17 1000)     fentaNYL 150 mcg/hr (07/07/17 1000)     norepinephrine Stopped (07/06/17 1800)     IV fluid REPLACEMENT ONLY         Allergies   Allergen Reactions     Erythromycin GI Disturbance     Vioxx      Nausea, vomiting            Physical Exam:   Vitals were reviewed.  All vitals stable.  Patient Vitals for the past 24 hrs:   BP Temp Temp src Pulse Resp SpO2 Weight   07/07/17 1000 - - - - - 98 % -   07/07/17 0900 - - - - - 98 % -   07/07/17 0800 - 99.9  F (37.7  C) Axillary - 22 97 % -   07/07/17 0700 - - - - - 97 % -   07/07/17 0645 - - - - - 96 % -   07/07/17 0625 (!) 203/87 - - - - - -   07/07/17 0600 - - - - 22 99 % -   07/07/17 0500 - - - - 22 99 % -   07/07/17 0400 - 99  F (37.2  C) Axillary - 22 98 % -   07/07/17 0300 - - - - - 99 % -   07/07/17 0200 - - - - 23 98 % -   07/07/17 0148 - - - - - 98 % -   07/07/17 0100 - - - - - 97 % -   07/07/17 0000 - 99.6  F (37.6  C) Axillary - 22 98 % 100.2 kg (220 lb 14.4 oz)   07/06/17 2330 - - - - - 95 % -   07/06/17 2300 - - - - - 94 % -   07/06/17 2200 - - - - 22 97 % -   07/06/17 2130 - - - - - 95 % -   07/06/17 2100 - - - - - 98 % -   07/06/17 2030 - - - - - 98 % -   07/06/17 2000 - 99.8  F (37.7  C) Axillary - 22 95 % -   07/06/17 1930 - - - - - 95 % -   07/06/17 1900 - - - - - 95 % -   07/06/17 1845 - - - - - 95 % -   07/06/17 1830 - - - - - 94 % -   07/06/17 1815 - - - - - 93 % -   07/06/17 1800 - - - - - 97 % -   07/06/17 1745 - - - - - 96 % -   07/06/17 1730 - 99.3  F (37.4  C) Axillary - - 99 % -   07/06/17 1715 - - - - - 99 % -   07/06/17 1700 - - - - - 96 % -   07/06/17 1645 - - - - - 98 % -   07/06/17 1630 - - - - - 96 % -   07/06/17 1615 - - - - - 97 % -   07/06/17 1600 - 99  F (37.2  C)  Axillary - 23 97 % -   07/06/17 1545 - - - - - 97 % -   07/06/17 1543 - 99  F (37.2  C) Axillary - 25 96 % -   07/06/17 1530 - - - - - 96 % -   07/06/17 1521 - 98.8  F (37.1  C) - 101 23 96 % -   07/06/17 1515 - - - - - 96 % -   07/06/17 1500 - - - - 24 97 % -   07/06/17 1445 - - - - - 97 % -   07/06/17 1430 - - - - - 99 % -   07/06/17 1415 - - - - - 96 % -   07/06/17 1400 - - - - 24 96 % -   07/06/17 1345 - - - - - 97 % -   07/06/17 1330 - - - - - 96 % -   07/06/17 1315 - - - - - 96 % -   07/06/17 1300 - - - - 22 100 % -   07/06/17 1245 - - - - - 95 % -   07/06/17 1230 - - - - - 100 % -   07/06/17 1215 - - - - - 97 % -   07/06/17 1200 - 99.6  F (37.6  C) Axillary - 22 95 % -   07/06/17 1145 - - - - - 94 % -   07/06/17 1130 - - - - - 94 % -   07/06/17 1100 - - - - 22 97 % -   07/06/17 1045 - - - - - 98 % -     Ranges for vital signs:  Temp:  [98.8  F (37.1  C)-99.9  F (37.7  C)] 99.9  F (37.7  C)  Pulse:  [101] 101  Heart Rate:  [] 96  Resp:  [22-25] 22  BP: (203)/(87) 203/87  MAP:  [62 mmHg-106 mmHg] 84 mmHg  Arterial Line BP: ()/(45-72) 127/61  FiO2 (%):  [30 %] 30 %  SpO2:  [93 %-100 %] 98 %  Vitals:    07/04/17 0452 07/06/17 0100 07/07/17 0000   Weight: 94.2 kg (207 lb 11.2 oz) 97 kg (213 lb 13.5 oz) 100.2 kg (220 lb 14.4 oz)       Physical Examination:  GENERAL:  well-developed, well-nourished,in ICU bed, intubated  HEAD:  Head is normocephalic, atraumatic   EYES:  Eyes have anicteric sclerae   ENT:  ETT in place  LUNGS: coarse throughout  CARDIOVASCULAR:  Regular rate and rhythm with no murmurs,   ABDOMEN:  Quiet bowel sounds, surgical incision dressed.   SKIN:  No acute rashes.  Line in place without any surrounding erythema or exudate.  NEUROLOGIC:  Grossly nonfocal. Active x4 extremities         Laboratory Data:     No results found for: ACD4    Inflammatory Markers  Recent Labs   Lab Test  07/05/17   1810  03/05/17   0358  11/23/16   0813  11/16/16   0706  11/15/16   1039  11/07/16   0759    08/15/11   1151  04/11/11   1209  01/03/11   1246  02/15/10   1232   SED   --    --   45*   --    --    --    --   11  14  17*  25*   CRP  354.0  20.0*  58.0*  59.1*  49.8*  66.0*   < >  11.1*  9.0*  11.7*  17.5*    < > = values in this interval not displayed.       Immune Globulin Studies   Recent Labs   Lab Test  08/15/11   1243   IGG  1160   IGG1  734   IGG2  294   IGG3  7*   IGG4  59       Metabolic Studies     Recent Labs   Lab Test  07/07/17   0309  07/06/17   2334  07/06/17   1949   07/06/17   0316   02/22/17   0643   NA  144   --   143   < >  143   < >  133   POTASSIUM  4.0   --   4.3   < >  5.0   < >  4.8   CHLORIDE  117*   --   116*   < >  116*   < >  100   CO2  20   --   18*   < >  18*   < >  22   ANIONGAP  7   --   8   < >  9   < >  12   BUN  76*   --   75*   < >  62*   < >  46*   CR  2.71*   --   2.79*   < >  2.75*   < >  1.40*   GFRESTIMATED  25*   --   24*   < >  24*   < >  53*   GLC  142*   --   140*   < >  139*   < >  147*   A1C   --    --    --    --   Canceled, Test credited   Below Assay Range  NOTIFIED LEONARD ONEILL AT 0538 ON 7/6/17 BY CHRISSY     --    --    JACOB  7.6*   --   7.6*   < >  6.9*   < >  9.0   PHOS  3.7   --    --    --   5.5*   < >   --    MAG  2.2   --    --    --   2.1   < >   --    LACT  1.2  1.2  1.5   < >  1.5   < >  1.9   PCAL   --    --    --    --    --    --   1.76    < > = values in this interval not displayed.       Hepatic Studies  Recent Labs   Lab Test  07/05/17 2026  07/05/17   1810  07/05/17   1043  07/05/17   0346   06/28/12   1234   BILITOTAL  0.7   --   0.6  0.7   < >  1.7*   BILIDELTA   --    --    --    --    --   0.5*   BILICONJ   --    --    --    --    --   0.0   DBIL  0.3*   --    --   0.2   < >   --    ALKPHOS  29*   --   37*  38*   < >  146   PROTTOTAL  4.5*   --   4.5*  4.4*   < >  6.3*   ALBUMIN  2.4*   --   2.4*  2.2*   < >  3.5*   AST  24   --   30  42   < >  41   ALT  16   --   19  18   < >  41   LDH   --   289*   --    --    --    --     < > =  values in this interval not displayed.       Pancreatitis testing  Recent Labs   Lab Test  07/05/17   0346  03/05/17   0358  03/03/17   1458  02/21/17   1520  12/09/16   1159   09/30/10   1734   AMYLASE   --   53  70   --    --    --   82   LIPASE   --   216   --   326  161   --   138   TRIG  174*   --    --    --    --    < >   --     < > = values in this interval not displayed.       Gout Labs    No lab results found.    Hematology Studies    Recent Labs   Lab Test  07/07/17   0309  07/06/17   2334  07/06/17   1949  07/06/17   1555  07/06/17   1228  07/06/17   0741   WBC  1.2*  1.2*  1.0*  0.8*  0.9*  1.0*   ANEU  0.4*   --   0.5*  0.4*  0.6*  0.6*   ALYM  0.3*   --   0.1*  0.1*  0.1*  0.1*   ZINA  0.2   --   0.2  0.2  0.1  0.1   AEOS  0.0   --   0.0  0.0  0.0  0.0   HGB  7.3*  7.5*  7.6*  6.7*  6.9*  7.3*   HCT  21.9*  23.0*  22.6*  20.2*  20.8*  22.1*   PLT  27*  28*  28*  23*  22*  27*       Clotting Studies    Recent Labs   Lab Test  07/06/17   0316  07/06/17   0011  07/05/17 2053 07/05/17   1043   INR  1.80*  1.91*  1.92*  2.37*   PTT  47*  48*  52*   --        Iron Testing  Recent Labs   Lab Test  07/07/17   0309   07/06/17   1228   07/05/17   1810   02/08/16   1144   IRON   --    --    --    --    --    --   93   FEB   --    --    --    --    --    --   230*   IRONSAT   --    --    --    --    --    --   41   MCV  87   < >  87   < >  86   < >  92   HAPT   --    --    --    --   162   < >   --    RETP   --    --   1.6   < >   --    < >   --    RETICABSCT   --    --   39.4   < >   --    < >   --     < > = values in this interval not displayed.       Markers  Alpha Fetoprotein   Date Value Ref Range Status   02/03/2017  0 - 8 ug/L Final    <1.5  Assay Method:  Chemiluminescence using Siemens Centaur XP         Autoimmune Testing  Recent Labs   Lab Test  01/03/11   1246  02/15/10   1232   JESUS MANUEL  <1.0   --    RHF   --   10   ENASSA   --   0   ENASSB   --   0       Arterial Blood Gas Testing  Recent Labs   Lab  Test  07/06/17   2334  07/06/17   0015  07/05/17   2026  07/05/17   1810  07/05/17   1536   PH  7.26*  7.27*  7.27*  7.28*  7.17*   PCO2  37  35  35  37  49*   PO2  85  109*  155*  188*  180*   HCO3  17*  16*  16*  17*  18*   O2PER  30.0  30.0  40  50.0  60        Thyroid Studies     Recent Labs   Lab Test  02/08/16   1144  04/11/11   1209  02/15/10   1232  07/31/09   1254 04/22/09   TSH  3.35  2.77  2.53  1.95  1.61@   T4   --   1.23  0.90   --    --        Urine Studies   Recent Labs   Lab Test  07/06/17   1441  06/06/17   1605  03/24/17   1315  03/16/17   1010  03/03/17   1405   URINEPH  5.0  5.0  5.5  5.0  5.0   NITRITE  Negative  Negative  Negative  Negative  Negative   LEUKEST  Trace*  Negative  Negative  Negative  Negative   WBCU  9*  1  1  5*  0       CSF testing   Recent Labs   Lab Test  06/11/09   0225   CWBC  1   CRBC  2   CGLU  104*   CTP  54       Medication levels  Recent Labs   Lab Test  07/06/17   0316   VANCOMYCIN  22.1   TACROL  6.3       Microbiology:  Beta D Glucan levels (Fungitell assay)    No lab results found.    Last Culture results with specimen source  Culture Micro   Date Value Ref Range Status   07/05/2017 No growth after 2 days  Final   07/05/2017 No growth after 2 days  Final   07/05/2017 Heavy growth Enterococcus faecium (VRE) (A)  Final   07/05/2017 Culture negative after 20 hours  Final   07/05/2017 Culture negative monitoring continues  Final   07/05/2017 Culture negative monitoring continues  Final   07/04/2017 Culture negative monitoring continues  Final   07/04/2017 Culture negative monitoring continues  Final   07/04/2017 Culture negative after 2 days  Final   07/04/2017 No growth after 3 days  Final   07/04/2017 No growth after 3 days  Final    Specimen Description   Date Value Ref Range Status   07/05/2017 Blood Brown port  Final   07/05/2017 Blood Left Brachial Arterial blood  Final   07/05/2017 Bronchoalveolar Lavage Lower Lobes bilateral  Final   07/05/2017 Bronchoalveolar  Lavage Lower lobes bilateral  Final   07/05/2017 Bronchoalveolar Lavage Lower lobes bilateral  Final   07/05/2017 Bronchoalveolar Lavage Lower lobes bilateral  Final   07/05/2017 Abdominal Ascites Fluid  Final   07/05/2017 Abdominal Ascites Fluid  Final   07/05/2017 Abdominal Ascites Fluid  Final   07/04/2017 Nares  Final        Last check of C difficile  C Diff Toxin B PCR   Date Value Ref Range Status   10/27/2016  NEG Final    Negative  Negative: Clostridium difficile target DNA sequences NOT detected, presumed   negative for Clostridium difficile toxin B or the number of bacteria present   may be below the limit of detection for the test.   FDA approved assay performed using [employer] real-time PCR.   A negative result does not exclude actual disease due to Clostridium difficile   and may be due to improper collection, handling and storage of the specimen or   the number of organisms in the specimen is below the detection limit of the   assay.         Virology:  Log IU/mL of CMVQNT   Date Value Ref Range Status   07/03/2017 <2.1 <2.1 [Log_IU]/mL Final   06/26/2017 Not Calculated <2.1 [Log_IU]/mL Final       No results found for: H6RES    No results found for: EBQTC  No results found for: EBRES    BK viral loads No lab results found.    Parvovirus Testing  No lab results found.    Invalid input(s): PRVRES    Adenovirus Testing  No lab results found.    Invalid input(s): ADENAB, ADAG, ADENOVIRUS, ADQT    Imaging:  Recent Results (from the past 48 hour(s))   XR Chest Port 1 View    Narrative    XR CHEST PORT 1 VW  7/5/2017 10:47 AM      HISTORY: follow up     COMPARISON: 7/4/2017    FINDINGS: ET tube tip projects over the distal trachea, approximately  3.8 cm from the chacorta. Right IJ approach intravenous catheter tip  projects over the mid SVC. Enteric tube passes inferior left  hemidiaphragm, tip out of the field of view. Surgical clips project  over the right upper quadrant/right lung base. No  pneumothorax.  Bilateral pleural effusions. Bibasilar airspace opacities. Cardiac  silhouette is not enlarged.      Impression    IMPRESSION:   1. Increasingly prominent bilateral pleural effusions.  2. Diffuse patchy airspace opacities, which may represent pulmonary  edema or pneumonia.    I have personally reviewed the examination and initial interpretation  and I agree with the findings.    DAMIEN LIRA MD   US Liver Transplant Portable    Narrative    EXAMINATION: US LIVER TRANSPLANT PORTABLE, 7/5/2017 11:13 AM     COMPARISON: Liver transplant ultrasound 3/5/2017, CT abdomen pelvis  7/4/2017    HISTORY: Post liver transplantation with high lactate. Exploratory  laparotomy 7/5/2017 with post op diagnosis of typhlitis    TECHNIQUE:  Gray-scale, color Doppler and spectral flow analysis.    FINDINGS:   There is trace ascites.    Liver:   The liver demonstrates normal homogeneous echotexture. No  evidence of a focal hepatic mass.     Bile Ducts: Both the intra- and extrahepatic biliary system are of  normal caliber.  The common bile duct measures 3 mm in diameter.    Gallbladder: The gallbladder is surgically absent.    Kidneys:   Right kidney:  The right kidney demonstrates normal echotexture with  no evidence of a shadowing stone, focal mass or hydronephrosis.   13.0  cm in long axis dimension.  Left kidney:  The left kidney demonstrates normal echotexture with no  evidence of a shadowing stone, focal mass or hydronephrosis.   12.6 cm  in long axis dimension.    Pancreas: The pancreas is obscured by overlying bowel gas.    Spleen:  The spleen is enlarged with perisplenic varices, measuring  19.4 cm. In the sagittal dimension    Visualized portions of the aorta are unremarkable.    LIVER DOPPLER:  Splenic vein:  Patent continuous normal antegrade direction flow  towards the liver, 24 cm/sec.  Extrahepatic portal vein:  Patent continuous antegrade flow, 53  cm/sec.  Portal vein at anastomosis: Patent continuous  antegrade flow, 71  cm/sec.  Intrahepatic portal vein:  Patent continuous antegrade flow, 93  cm/sec.  Right portal vein flow is antegrade, measuring 38 cm/sec.  Left portal vein flow is antegrade, measuring 25 cm/sec.    Inferior vena cava: patent with flow toward the heart throughout..  IVC above anastomosis:  104 cm/sec.  IVC at anastomosis:  80 cm/sec.  Intrahepatic IVC:  57 cm/sec.  Extrahepatic IVC:  39 cm/sec.    Right, mid, left hepatic veins: Patent with flow towards the inferior  vena cava.    Extrahepatic hepatic artery: Improved appearance of the extrahepatic  waveforms, now with low-resistance appearance with flow towards the  liver. 109 cm/sec with resistive index 0.76, previously 1.0 on  immediate postoperative liver transplant ultrasound 3/5/2017  Right hepatic artery: 84 cm/sec with resistive index 0.72.  Left hepatic artery: 42 cm/sec with resistive index 0.74.      Impression    Impression:   1.  Unremarkable appearance of the transplant liver.  2.  Normal Doppler evaluation of the post transplant liver. Interval  improvement in waveform and resistive indices of the extrahepatic  artery compared to the immediate postoperative ultrasound on 3/5/2017.    I have personally reviewed the examination and initial interpretation  and I agree with the findings.    DALLIN RENAE MD   XR Chest Port 1 View    Narrative    Exam: XR CHEST PORT 1 VW, 7/6/2017 9:26 AM    Indication: follow up     Comparison: 7/5/2017.    Findings:   Single AP view the chest. ET tube tip approximately 6.1 cm from the  chacorta, right IJ central venous catheter with tip at the SVC, and  enteric tube with tip outside the field-of-view. Surgical clips over  the right upper quadrant. Stable low lung volumes. Cardiac silhouette  is unchanged in largely obscured. Pulmonary vascular is indistinct.  Increased layering right-sided pleural effusion and stable small  left-sided pleural effusion. Increase in diffuse airspace opacities.  No  pneumothorax.      Impression    Impression:   1. Low lung volumes with increasing diffuse airspace opacities, right  lung predominant. Worsening pulmonary edema with underlying infection  versus atelectasis.  2. Increased small to moderate layering right-sided pleural effusion  and small left-sided pleural effusion.    I have personally reviewed the examination and initial interpretation  and I agree with the findings.    DAMIEN LIRA MD   XR Chest Port 1 View    Narrative    Exam: XR CHEST PORT 1 VW  7/7/2017 1:39 AM      History: intubated    Comparison: Radiograph 7/6/2017    Findings: Stable positioning of endotracheal tube, enteric tube, and  right-sided PICC. Cardiac silhouette is obscured and unchanged.  Trachea is midline. Unchanged bilateral pleural effusions. No  pneumothorax. Bibasilar opacities. Visualized abdomen is normal.      Impression    Impression: Unchanged bilateral pleural effusions with associated  bibasilar atelectasis versus consolidation.    I have personally reviewed the examination and initial interpretation  and I agree with the findings.    BASILIO SAGASTUME MD

## 2017-07-07 NOTE — PROGRESS NOTES
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     Graft Function: Good function. US liver normal doppler. LFTs WNL. Would repeat levels every 48 hours for graft monitoring.   Immunosuppression Management:   CellCept 250 mg BID. Held since 6/27/17 due to neutropenia. Continue to hold.   Tac goal level 5-8. 7/7 level 6.8 (9 hour trough). Continue Prograf 4 mg AM, 3 mg PM dosing. Repeat tac level tomorrow for 12 hour trough.   Cardiorespiratory: Intubated for ex lap, has since required pressor support, norepinephrine titrated off yesterday. Moderate right sided pleural effusion. No intervention, monitor.  Vent management per SICU team, have tried one trial of pressure support which was unsuccessful possibly due to oversedation, will wean Fentanyl today.   BNP 16,000. Possible due to volume overload. Consider ECHO given hypotension.   Hematology: Pancytopenia, acute on chronic. MMF and bactrim held (since 6/27). Valcyte held on admission  Neupogen 480 mcg given on 7/4 and 7/5 with minimal response. ANC 7/6 300. Hematology consulted; recommend d/c zosyn and continuing to hold cellcept, valcyte, bactrim. Hematology recommending continuing neupogen until ANC >1000 for two consecutive days. CMV seronegative and donor seropositive. 7/3 CMV PCR < 137. Repeat CMV PCR in a few days.   GI: CT from [place] demonstrating right sided colonic inflammation, AXR on admission with dilated loop of bowel in central abdomen, felt likely to sigmoid. Initial impression consistent with Typhlitis however given worsening abdominal pain on examination,  hypotension and increasing lactate levels, elected to proceed with exploratory laparotomy. Findings on reexploration demonstrating persistent inflammation of the right colon without evidence of ischemia. Lactic acid now 1.2. Continue NPO and TPN. Continue antibiotics.   Fluid/Electrolytes/Renal: MIVF: per SICU. Recommend to decrease total fluids due to hypervolemia. Albumin replacements stopped. EJ, secondary to hypoperfusion, sepsis. Cr stable today. UO increasing. Nephrology consulting. Hyperkalemia, PTA on florinef. K today 4.0.   Endocrine: DM II, on insulin gtt.   Infectious disease: Tmax 100.4, WBC 0.8, ANC 0.3 on admission. Started on Zosyn, Vancomycin, Flagyl, Micafungin on admission (7/4/2017). Flagyl stopped. CMV PCR <137 now detectable in serum, plan to repeat CMV 7/10. Holding Valcyte 450 mg given neutropenia. Continue to hold bactrim. Blood cultures obtained 7/4, no growth thus far. Abdominal fluid culture collected intraoperatively, gram stain with no organisms, fungal and bacterial culture, no growth thus far. Bronchoalveolar lavage growing VRE. Tx ID consulted recommending discontinuing Ertapenem and restarting Zosyn for improved abdominal coverage. Heme consulted, recommend d/c zosyn. Discussed with pharmacy recommending Synercid for VRE coverage due to aspiration episode, avoid linezolid given pancytopenia.  Will obtain C dff if diarrhea.  Access: PIV x2, Central Line R IJ, R radial arterial line  Prophylaxis: Ranitidine IV, DVT-SCD  Disposition: SICU    Medical Decision Making: Medium  Admit [id number] (moderate level decision making)    PILLO/Fellow/Resident Provider: Luiza Wing PA-C    Faculty: Tip Zhang M.D.    __________________________________________________________________  Transplant History:.  3/4/2017 DD OLT with Dr. Tran (Liver), Postoperative day: 125     Interval History: History is obtained from EMR and nursing staff.  Overnight events: Trial of ventilation weaning this  morning not successful per SICU team. Has had urine output overnight, no longer requiring pressors. More alert intermittently overnight. Discussed with ID fellow.     ROS:   A 10-point review of systems was negative except as noted above.    Meds:    ertapenem (INVanz) IV  1 g Intravenous Q24H     lipids  250 mL Intravenous Q24H     filgrastim (NEUPOGEN/GRANIX) intravenous  5 mcg/kg Intravenous Daily at 8 pm     pantoprazole  40 mg Intravenous Q24H     fludrocortisone  0.1 mg Oral Daily     sodium chloride (PF)  3 mL Intracatheter Q8H     tacrolimus  3 mg Oral QPM     tacrolimus  4 mg Oral QAM       Physical Exam:     Admit Weight: 94.2 kg (207 lb 11.2 oz) (SCALE 2)    Current vitals:   /58  Pulse 101  Temp 99  F (37.2  C) (Axillary)  Resp 22  Ht 1.829 m (6')  Wt 100.2 kg (220 lb 14.4 oz)  SpO2 99%  BMI 29.96 kg/m2         Vital sign ranges:    Temp:  [98.8  F (37.1  C)-100.4  F (38  C)] 99  F (37.2  C)  Pulse:  [101] 101  Heart Rate:  [] 98  Resp:  [22-25] 22  MAP:  [62 mmHg-106 mmHg] 96 mmHg  Arterial Line BP: ()/(36-72) 143/72  FiO2 (%):  [30 %] 30 %  SpO2:  [93 %-100 %] 99 %  Patient Vitals for the past 24 hrs:   Temp Temp src Pulse Heart Rate Resp SpO2 Weight   07/07/17 0620 - - - 98 - - -   07/07/17 0600 - - - 92 22 99 % -   07/07/17 0500 - - - 92 22 99 % -   07/07/17 0400 99  F (37.2  C) Axillary - 96 22 98 % -   07/07/17 0300 - - - 94 - 99 % -   07/07/17 0200 - - - 94 23 98 % -   07/07/17 0148 - - - - - 98 % -   07/07/17 0100 - - - 101 - 97 % -   07/07/17 0000 99.6  F (37.6  C) Axillary - 107 22 98 % 100.2 kg (220 lb 14.4 oz)   07/06/17 2330 - - - 107 - 95 % -   07/06/17 2300 - - - 114 - 94 % -   07/06/17 2200 - - - 93 22 97 % -   07/06/17 2130 - - - 90 - 95 % -   07/06/17 2100 - - - 101 - 98 % -   07/06/17 2030 - - - 101 - 98 % -   07/06/17 2000 99.8  F (37.7  C) Axillary - 107 22 95 % -   07/06/17 1930 - - - 93 - 95 % -   07/06/17 1900 - - - 102 - 95 % -   07/06/17 1845 - - - 101 -  95 % -   07/06/17 1830 - - - 93 - 94 % -   07/06/17 1815 - - - 97 - 93 % -   07/06/17 1800 - - - 97 - 97 % -   07/06/17 1745 - - - 101 - 96 % -   07/06/17 1730 99.3  F (37.4  C) Axillary - 96 - 99 % -   07/06/17 1715 - - - 96 - 99 % -   07/06/17 1700 - - - 107 - 96 % -   07/06/17 1645 - - - 103 - 98 % -   07/06/17 1630 - - - 102 - 96 % -   07/06/17 1615 - - - 102 - 97 % -   07/06/17 1600 99  F (37.2  C) Axillary - 97 23 97 % -   07/06/17 1545 - - - 101 - 97 % -   07/06/17 1543 99  F (37.2  C) Axillary - 102 25 96 % -   07/06/17 1530 - - - 100 - 96 % -   07/06/17 1521 98.8  F (37.1  C) - 101 102 23 96 % -   07/06/17 1515 - - - 103 - 96 % -   07/06/17 1500 - - - 101 24 97 % -   07/06/17 1445 - - - 104 - 97 % -   07/06/17 1430 - - - 105 - 99 % -   07/06/17 1415 - - - 104 - 96 % -   07/06/17 1400 - - - 104 24 96 % -   07/06/17 1345 - - - 103 - 97 % -   07/06/17 1330 - - - 104 - 96 % -   07/06/17 1315 - - - 105 - 96 % -   07/06/17 1300 - - - 105 22 100 % -   07/06/17 1245 - - - 105 - 95 % -   07/06/17 1230 - - - 105 - 100 % -   07/06/17 1215 - - - 104 - 97 % -   07/06/17 1200 99.6  F (37.6  C) Axillary - 104 22 95 % -   07/06/17 1145 - - - 103 - 94 % -   07/06/17 1130 - - - 102 - 94 % -   07/06/17 1115 - - - 104 - - -   07/06/17 1100 - - - 107 22 97 % -   07/06/17 1045 - - - 101 - 98 % -   07/06/17 1030 - - - 104 - - -   07/06/17 1015 - - - 108 - 97 % -   07/06/17 1000 - - - 108 - 97 % -   07/06/17 0945 - - - 107 - 96 % -   07/06/17 0930 - - - 106 - 96 % -   07/06/17 0915 - - - 110 - 98 % -   07/06/17 0900 - - - 106 - 98 % -   07/06/17 0845 - - - 107 - 97 % -   07/06/17 0830 - - - 110 - 97 % -   07/06/17 0823 - - - - - 96 % -   07/06/17 0815 - - - 111 - 95 % -   07/06/17 0800 100.4  F (38  C) Axillary - 114 24 97 % -   07/06/17 0745 - - - 112 - 98 % -   07/06/17 0730 - - - - - 97 % -   07/06/17 0715 - - - - - 96 % -   07/06/17 0700 - - - 115 - 97 % -   07/06/17 0645 - - - - - 98 % -     General Appearance: sedated    Skin: normal, warm, dry  Heart: ST, normal S1 and S2. B/l feet warm, b/l toes cool, dusky  Lungs: Vented  Abdomen: The abdomen is soft, midline incision is c/d/i and covered. Incision from prior Liver transplant is noted and well healed.   : vazquez is present, yellow urine in bag.   Extremities:BLE trace edema.  Neurologic: sedated.     Data:   CMP    Recent Labs  Lab 07/07/17  0309 07/06/17  1949  07/06/17  0741 07/06/17  0316  07/05/17  2026  07/05/17  1043 07/05/17  0859    143  < >  --  143  < > 144  < > 142 137   POTASSIUM 4.0 4.3  < > 4.9 5.0  < > 5.6*  < > 6.3* 6.0*   CHLORIDE 117* 116*  < >  --  116*  < > 116*  < > 113*  --    CO2 20 18*  < >  --  18*  < > 15*  < > 19*  --    * 140*  < >  --  139*  < > 119*  < > 244* 316*   BUN 76* 75*  < >  --  62*  < > 59*  < > 53*  --    CR 2.71* 2.79*  < >  --  2.75*  < > 2.80*  < > 2.69*  --    GFRESTIMATED 25* 24*  < >  --  24*  < > 24*  < > 25*  --    GFRESTBLACK 30* 29*  < >  --  29*  < > 29*  < > 30*  --    JACOB 7.6* 7.6*  < >  --  6.9*  < > 6.8*  < > 6.9*  --    ICAW  --   --   --  4.2*  --   --   --   --   --  4.4   MAG 2.2  --   --   --  2.1  < > 2.1  --   --   --    PHOS 3.7  --   --   --  5.5*  < > 5.4*  --   --   --    ALBUMIN  --   --   --   --   --   --  2.4*  --  2.4*  --    BILITOTAL  --   --   --   --   --   --  0.7  --  0.6  --    ALKPHOS  --   --   --   --   --   --  29*  --  37*  --    AST  --   --   --   --   --   --  24  --  30  --    ALT  --   --   --   --   --   --  16  --  19  --    < > = values in this interval not displayed.  CBC    Recent Labs  Lab 07/07/17  0309 07/06/17  2334  07/06/17  0316   HGB 7.3* 7.5*  < > 7.1*   WBC 1.2* 1.2*  < > 0.8*   PLT 27* 28*  < > 27*   A1C  --   --   --  Canceled, Test credited Below Assay RangeNOTIFIED LEONARD ONEILL AT 0538 ON 7/6/17 BY [facility]   < > = values in this interval not displayed.  COAGS    Recent Labs  Lab 07/06/17  0316 07/06/17  0011   INR 1.80* 1.91*   PTT 47* 48*       Urinalysis  Recent Labs   Lab Test  07/06/17   1441  06/14/17   1508  06/06/17   1605   04/11/16   1345   COLOR  Yellow   --   Yellow   < >  Yellow   APPEARANCE  Cloudy   --   Slightly Cloudy   < >  Clear   URINEGLC  Negative   --   50*   < >  30*   URINEBILI  Negative   --   Negative   < >  Negative   URINEKETONE  Negative   --   Negative   < >  Negative   SG  1.014   --   1.015   < >  1.016   UBLD  Moderate*   --   Negative   < >  Small*   URINEPH  5.0   --   5.0   < >  5.0   PROTEIN  30*   --   100*   < >  30*   NITRITE  Negative   --   Negative   < >  Negative   LEUKEST  Trace*   --   Negative   < >  Negative   RBCU  >182*   --   2   < >  1   WBCU  9*   --   1   < >  1   UTPG   --   1.55*   --    --   0.41*    < > = values in this interval not displayed.       Cultures:   Blood Cultures x2 7/4/2017 Pending     Abdominal Fluid Culture 7/4/2017: Pending     Abdominal Fluid Culture 7/5/17: Pending    Bronchoalveolar Culture 7/5/17: VRE.     CT scan:    7/4/2017 CONCLUSION:   1. Right colonic wall thickening suggesting colitis. Follow-up is necessary to confirm resolution in order to exclude colonic mass.  2. Transverse colon is not well distended reducing evaluation for wall thickening.  3. Small amount of ascites.  4. Splenomegaly.  5. Prominent portal and splenic veins. If confirmation of vascular patency is indicated consider follow-up ultrasound of the liver with Doppler.  6. Small right pleural effusion.  7. Stranding in the subcutaneous fat of the ventral pelvis.     Abdominal XR 7/4/2017:   1. No evidence of pneumoperitoneum.  2. Dilated loop of bowel in the central abdomen, likely sigmoid.    XR Chest Portable 1 View 7/4/17:  1. Endotracheal tube tip projects 5.3 cm from the chacorta.  2. Increased bilateral pleural effusions, right greater than left.  3. Unchanged bibasilar patchy opacities.

## 2017-07-07 NOTE — PLAN OF CARE
Problem: Sepsis (Adult)  Goal: Signs and Symptoms of Listed Potential Problems Will be Absent or Manageable (Sepsis)  Signs and symptoms of listed potential problems will be absent or manageable by discharge/transition of care (reference Sepsis (Adult) CPG).   Outcome: No Change  Patient continues to be vented and sedated with fentanyl. Refer to flowsheets for titration. Upon turning fentanyl down to 50mcg/hr to arouse patient he become restless, hypertensive with BP's 189-190/80-90 mmHG and HR in 100's. Patient was not able to follow commands. Continues on TPN and insulin gtt. Will continue to monitor and refer to flowsheets for documentation.

## 2017-07-07 NOTE — PROGRESS NOTES
SURGICAL ICU PROGRESS NOTE  July 7, 2017      CO-MORBIDITIES:   Neutropenic colitis (H)  (primary encounter diagnosis)  Liver transplant recipient 2/2 CASTAÑEDA  Type II DM  RONNIE on BiPAP     ASSESSMENT: Camacho Bhagat is a 51 year old male with PMHx significant for liver transplant March 2017 2/2 to ESLD from CASTAÑEDA admitted on 7/4 for abdominal pain and fever found to have neutropenic colitis s/p ex lap on 7/4 and 7/5. Weaned off pressors on 7/7.     Events/Changes 7/7:  - D/C versed , propofol as needed for agitation  - not weaning yet, hold for precedex   - net -500mL/day nephrology  - 40 mg lasix ordered  - transplant ID recs continue zosyn, better coverage with ertapenem  - BAL enterococcus VRE- posssible synercid to be confirmed with Transplant ID       PLAN:  Neuro/ pain/ sedation:  - Fentanyl infusion 100-200 mcg/hr  - propofol infusion   - Dilaudid IV PRN      CV:   #Hypotension secondary to septic shock   - Continuous cardiac monitoring.   - [place] elevated to [address]  - Echo 7/6 normal  - LA WNL currently      Pulm:   #Bilateral pleural effusions (R>L) with patchy bibasilar opacities on chest x-ray  - Vent mode AC, RR 12, , PEEP 5. Fi02 50  - CXR 7/7 with continued opacification of R lung, will not place pigtail or chest tube at this time given pancytopenia  - Repeat chest x-rays for pleural effusions      FEN/ GI:   # Neutropenic Colitis in setting of immunosupression  # CASTAÑEDA s/p  liver transplantation 3/2017  - OR on 7/4 for ex lap, no bowel resection and abdomen left open with wound vac in place  - OR on 7/5 for ex lap, abdomen closed, no bowel resection   - NPO except medications  - TPN via central line  - Hepatic profile looks reassuring, repeat per transplant   - Liver US per transplant- unremarkable liver transplant on imaging       Renal/Fluids:  #Acute kidney injury  #Hyperkalemia, resolving  #Metabolic acidosis with respiratory compensation  - Lasix for gentle diuresis, stopped infusion  - UOP  improving  - Cr stabilizing at 2.7  - Nephrology following, appreciated recommendations  - ICU electrolyte replacement protocol  - Crowe catheter to stay in place for strict intake and output monitoring  - Q 8 BMP    Endocrine:   # Type II DM  -  insulin gtt and SSI  - continue home florinef       ID/ Abx:  #Neutropenia in setting of immunosuppression   # Neutropenic Colitis  - Antibiotics narrowed to Zosyn per transplant ID, improved coverage with ertapenem  - Synercid for BAL on 7/5 growing enterococcus faecalis with history of VRE (rectal)  To be confirmed with transplant ID  - Outpatient valcyte prophylaxis was discontinued       Heme:   # Pancytopenia in setting of immunosuppression   - Hemoglobin transfusion >7  - Platelets transfusion if <10,000  - hematology consulted  - Q8H CMP  - Neupogen daily until ANC > 2  - Tacrolimus goal 5-8 per transplant, Fluticasone immunosuppresion. Holding Cellcept.  - peripheral smear       Prophylaxis:    - DVT: SCD. Wait to start chemical ppx.  - GI: Ranitidine IV.       MSK:  - PT/OT held until surgery complete      Lines/Drains:  - ETT, IJ TLCVC, left axillary a-line, NG tube, Crowe     Patient seen, findings and plan discussed with surgical ICU staff Dr. Albert.    Johnson Hobbs MD [facility], [place]  [phone]  July 7, 2017 7:41 AM      ====================================    SUBJECTIVE:   No major events overnight, able to be weaned off pressors overnight.    OBJECTIVE:   1. VITAL SIGNS:   Temp:  [98.8  F (37.1  C)-100.4  F (38  C)] 99  F (37.2  C)  Pulse:  [101] 101  Heart Rate:  [] 100  Resp:  [22-25] 22  MAP:  [62 mmHg-106 mmHg] 70 mmHg  Arterial Line BP: ()/(36-72) 104/50  FiO2 (%):  [30 %] 30 %  SpO2:  [93 %-100 %] 97 %  Ventilation Mode: CMV/AC  FiO2 (%): 30 %  Rate Set (breaths/minute): 22 breaths/min  Tidal Volume Set (mL): 500 mL  PEEP (cm H2O): 5 cmH2O  Pressure Support (cm H2O): 7 cmH2O  Oxygen Concentration (%): 30 %  Resp: 22    2. INTAKE/ OUTPUT:   I/O  last 3 completed shifts:  In: 3575.48 [I.V.:1512.48; NG/GT:60]  Out: 1510 [Urine:1210; Emesis/NG output:300]    3. PHYSICAL EXAMINATION:     GEN:  male, resting in bed, sedated on exam   EYES: PERRL, Anicteric sclera.   HEENT:  Normocephalic, atraumatic, trachea midline, ETT secure  CV: RRR, no murmurs, rubs or gallops   PULM/CHEST: Diminished breath sounds right worse than left Bilateral rhonchi R>L.  GI: midline incision with bandage which is clean, dry and intact, hypoactive bowel sounds. Bilious NG output.  : vazquez catheter in place, urine dark yellow and clear  EXTREMITIES: Palpable pulses, warm. Distal toes and index fingers still slightly dusky.  NEURO: sedated on exam but moving. Not following commands.  SKIN: cool extremities   Imaging personally reviewed: CXR with right sided pleural effusion remains stable    4. INVESTIGATIONS:   Arterial Blood Gases     Recent Labs  Lab 07/06/17  2334 07/06/17  0015 07/05/17  2026 07/05/17  1810   PH 7.26* 7.27* 7.27* 7.28*   PCO2 37 35 35 37   PO2 85 109* 155* 188*   HCO3 17* 16* 16* 17*     Complete Blood Count     Recent Labs  Lab 07/07/17  0309 07/06/17  2334 07/06/17 1949 07/06/17  1555   WBC 1.2* 1.2* 1.0* 0.8*   HGB 7.3* 7.5* 7.6* 6.7*   PLT 27* 28* 28* 23*     Basic Metabolic Panel    Recent Labs  Lab 07/07/17  0309 07/06/17  1949 07/06/17  1555 07/06/17  1228  07/06/17  0316    143 145*  --   --  143   POTASSIUM 4.0 4.3 4.5 4.6  < > 5.0   CHLORIDE 117* 116* 117*  --   --  116*   CO2 20 18* 19*  --   --  18*   BUN 76* 75* 74*  --   --  62*   CR 2.71* 2.79* 2.78*  --   --  2.75*   * 140* 159*  --   --  139*   < > = values in this interval not displayed.  Liver Function Tests    Recent Labs  Lab 07/06/17  0316 07/06/17  0011 07/05/17 2053 07/05/17  2026 07/05/17  1043 07/05/17  0346 07/04/17 2002   AST  --   --   --  24 30 42 38   ALT  --   --   --  16 19 18 18   ALKPHOS  --   --   --  29* 37* 38* 42   BILITOTAL  --   --   --  0.7 0.6 0.7  1.0   ALBUMIN  --   --   --  2.4* 2.4* 2.2* 2.5*   INR 1.80* 1.91* 1.92*  --  2.37* 2.16* 1.88*     Pancreatic Enzymes  No lab results found in last 7 days.  Coagulation Profile    Recent Labs  Lab 07/06/17  0316 07/06/17  0011 07/05/17  2053 07/05/17  1043  07/04/17 2002   INR 1.80* 1.91* 1.92* 2.37*  < > 1.88*   PTT 47* 48* 52*  --   --  49*   < > = values in this interval not displayed.      5. RADIOLOGY:   Recent Results (from the past 24 hour(s))   XR Chest Port 1 View    Narrative    Exam: XR CHEST PORT 1 VW, 7/6/2017 9:26 AM    Indication: follow up     Comparison: 7/5/2017.    Findings:   Single AP view the chest. ET tube tip approximately 6.1 cm from the  chacorta, right IJ central venous catheter with tip at the SVC, and  enteric tube with tip outside the field-of-view. Surgical clips over  the right upper quadrant. Stable low lung volumes. Cardiac silhouette  is unchanged in largely obscured. Pulmonary vascular is indistinct.  Increased layering right-sided pleural effusion and stable small  left-sided pleural effusion. Increase in diffuse airspace opacities.  No pneumothorax.      Impression    Impression:   1. Low lung volumes with increasing diffuse airspace opacities, right  lung predominant. Worsening pulmonary edema with underlying infection  versus atelectasis.  2. Increased small to moderate layering right-sided pleural effusion  and small left-sided pleural effusion.    I have personally reviewed the examination and initial interpretation  and I agree with the findings.    DAMIEN LIRA MD   XR Chest Port 1 View    Narrative    Exam: XR CHEST PORT 1 VW  7/7/2017 1:39 AM      History: intubated    Comparison: Radiograph 7/6/2017    Findings: Stable positioning of endotracheal tube, enteric tube, and  right-sided PICC. Cardiac silhouette is within normal limits. Trachea  is midline. Unchanged bilateral pleural effusions. No pneumothorax.  Bibasilar opacities. Visualized abdomen is normal.       Impression    Impression: Unchanged bilateral pleural effusions with associated  bibasilar atelectasis versus consolidation.       =========================================

## 2017-07-07 NOTE — PROGRESS NOTES
[facility]  PROGRESS NOTE - HEMATOLOGY      Camacho Bhagat MRN# [id number]   Age: 51 year old YOB: 1965     Date of Consultation: July 7, 2017  Primary care provider: Shay Kirkpatrick         Assessment and Plan:     Summary: 51 year old with CASTAÑEDA complicated by HCC (s/p  TACE x2) s/p liver transplant (3/2017) on chronic immunosuppression admitted to hospital due to suspected sepsis, found to have typhlitis and now s/p POD2 s/p ExLap, with BAL cultures showing heavy growth VRE, his status further being complicated by new onset pancytopenia.    ASSESSMENT  The etiology of the marrow aplasia continues to be unclear, but most likely this is an acute-on-chronic process. Pt has had thrmobocytopenia for a long time due to liver failure, and the counts of all cell lines have been progressively declining whilst on Tacrolimus, mycophenolate, Bactrim and Valgancyclovir. Zosyn, Vancomycin have also been associated with thrombocytopenia. His current aplasia is therefore likely multi-factorial 2/2 immunosuppression (mycophenolate), medication (Bactrim, Valgancyclovir) and infection (sepsis-related BM suppression or viral infection).  Primary bone marrow failure syndromes like MDS, leukemia are less likely. Unclear if drug dosage changes contributory to pancytopenia at time of presentation. Also worth considering are autoimmune, nutritional (Vit B12) causes.    PLAN  In agreement with the assessment above, we recommend the following plan:   -Agree with stopping Mycophenolate, TMP-SMX, Zosyn, Valgancyclovir for now  -Continue Filgrastim until ANC>1000 for 2 consecutive days  -Continue Broad spectrum ABx as per primary team/transplant ID  -Transfuse as needed for goal Hb >7, Plt > 74597  -No indication for BM biopsy for now        I, Zane Ruffin am acting as scribe for Dr. Catrachito Drake, Attending.    SUBJECTIVE:     No acute events overnight.   ANC not improving today despite  filgrastim.   ROS:  ROS: 10 point ROS neg other than the symptoms noted above in the HPI.    OBJECTIVE   Vital Signs: BP (!) 203/87  Pulse 101  Temp 99.9  F (37.7  C) (Axillary)  Resp 22  Ht 1.829 m (6')  Wt 100.2 kg (220 lb 14.4 oz)  SpO2 97%  BMI 29.96 kg/m2, BMI= Body mass index is 29.96 kg/(m^2)..    Physical Exam:   General:  Sedated and intubated  Skin:  No jaundice, pallor or cyanosis. No rashes, worrisome lesions, or protuberances.  HEENT: MMM, no oral lesions, no pallor, icterus, no obvious petechiae or purpura  Pulm: Symmetrical expansion, CTAB  Ventilation Mode: CMV/AC  FiO2 (%): 30 %  Rate Set (breaths/minute): 22 breaths/min  Tidal Volume Set (mL): 500 mL  PEEP (cm H2O): 5 cmH2O  Pressure Support (cm H2O): 7 cmH2O  Oxygen Concentration (%): 30 %  Resp: 22    CV: PPs 2+ B/L. No JVD. RRR no m/r/g.  GI: Abd soft, wound healthy, no drains  : External genitalia with no visible ulcers, vazquez in place.  Intake/Output Summary (Last 24 hours) at 07/07/17 0920  Last data filed at 07/07/17 0900   Gross per 24 hour   Intake          3322.68 ml   Output             1705 ml   Net          1617.68 ml       MSK: Trace edema BL. Purple discoloration of toes in LE.  CNS: No response to noxious stimulus, GCS 6-7/RASS 3. DTRs 2+.     Laboratory     Recent Labs  Lab 07/07/17  0309 07/06/17  1949 07/06/17  1555 07/06/17  1228  07/06/17  0316 07/06/17  0011 07/05/17  2026  07/05/17  1043  07/05/17  0346 07/04/17 2002    143 145*  --   --  143 141 144  < > 142  < > 140 140   POTASSIUM 4.0 4.3 4.5 4.6  < > 5.0 5.2 5.6*  < > 6.3*  < > 6.6* 5.7*   CHLORIDE 117* 116* 117*  --   --  116* 113* 116*  < > 113*  --  114* 113*   CO2 20 18* 19*  --   --  18* 18* 15*  < > 19*  --  15* 16*   ANIONGAP 7 8 10  --   --  9 10  --   < > 9  --  12 10   * 140* 159*  --   --  139* 115* 119*  < > 244*  < > 238* 169*   BUN 76* 75* 74*  --   --  62* 65* 59*  < > 53*  --  47* 43*   CR 2.71* 2.79* 2.78*  --   --  2.75* 2.78*  2.80*  < > 2.69*  --  2.49* 2.34*   GFRESTIMATED 25* 24* 24*  --   --  24* 24* 24*  < > 25*  --  27* 29*   GFRESTBLACK 30* 29* 29*  --   --  29* 29* 29*  < > 30*  --  33* 35*   JACOB 7.6* 7.6* 7.5*  --   --  6.9* 6.7* 6.8*  < > 6.9*  --  7.2* 7.2*   MAG 2.2  --   --   --   --  2.1 1.9 2.1  --   --   --  2.1  --    PHOS 3.7  --   --   --   --  5.5* 6.0* 5.4*  --   --   --  4.8*  --    PROTTOTAL  --   --   --   --   --   --   --  4.5*  --  4.5*  --  4.4* 4.5*   ALBUMIN  --   --   --   --   --   --   --  2.4*  --  2.4*  --  2.2* 2.5*   BILITOTAL  --   --   --   --   --   --   --  0.7  --  0.6  --  0.7 1.0   ALKPHOS  --   --   --   --   --   --   --  29*  --  37*  --  38* 42   AST  --   --   --   --   --   --   --  24  --  30  --  42 38   ALT  --   --   --   --   --   --   --  16  --  19  --  18 18   < > = values in this interval not displayed.  CBC  Recent Labs  Lab 07/07/17  0309 07/06/17  2334 07/06/17  1949 07/06/17  1555   WBC 1.2* 1.2* 1.0* 0.8*   RBC 2.51* 2.64* 2.58* 2.32*   HGB 7.3* 7.5* 7.6* 6.7*   HCT 21.9* 23.0* 22.6* 20.2*   MCV 87 87 88 87   MCH 29.1 28.4 29.5 28.9   MCHC 33.3 32.6 33.6 33.2   RDW 15.3* 15.3* 15.1* 15.4*   PLT 27* 28* 28* 23*     INR  Recent Labs  Lab 07/06/17  0316 07/06/17  0011 07/05/17 2053 07/05/17  1043   INR 1.80* 1.91* 1.92* 2.37*     Arterial Blood Gas  Recent Labs  Lab 07/06/17  2334 07/06/17  0015 07/05/17 2026 07/05/17  1810   PH 7.26* 7.27* 7.27* 7.28*   PCO2 37 35 35 37   PO2 85 109* 155* 188*   HCO3 17* 16* 16* 17*   O2PER 30.0 30.0 40 50.0        Imaging  Us Liver Transplant Portable    Result Date: 7/5/2017  EXAMINATION: US LIVER TRANSPLANT PORTABLE, 7/5/2017 11:13 AM COMPARISON: Liver transplant ultrasound 3/5/2017, CT abdomen pelvis 7/4/2017 HISTORY: Post liver transplantation with high lactate. Exploratory laparotomy 7/5/2017 with post op diagnosis of typhlitis TECHNIQUE:  Gray-scale, color Doppler and spectral flow analysis. FINDINGS: There is trace ascites. Liver:    The liver demonstrates normal homogeneous echotexture. No evidence of a focal hepatic mass. Bile Ducts: Both the intra- and extrahepatic biliary system are of normal caliber.  The common bile duct measures 3 mm in diameter. Gallbladder: The gallbladder is surgically absent. Kidneys: Right kidney:  The right kidney demonstrates normal echotexture with no evidence of a shadowing stone, focal mass or hydronephrosis.   13.0 cm in long axis dimension. Left kidney:  The left kidney demonstrates normal echotexture with no evidence of a shadowing stone, focal mass or hydronephrosis.   12.6 cm in long axis dimension. Pancreas: The pancreas is obscured by overlying bowel gas. Spleen:  The spleen is enlarged with perisplenic varices, measuring 19.4 cm. In the sagittal dimension Visualized portions of the aorta are unremarkable. LIVER DOPPLER: Splenic vein:  Patent continuous normal antegrade direction flow towards the liver, 24 cm/sec. Extrahepatic portal vein:  Patent continuous antegrade flow, 53 cm/sec. Portal vein at anastomosis: Patent continuous antegrade flow, 71 cm/sec. Intrahepatic portal vein:  Patent continuous antegrade flow, 93 cm/sec. Right portal vein flow is antegrade, measuring 38 cm/sec. Left portal vein flow is antegrade, measuring 25 cm/sec. Inferior vena cava: patent with flow toward the heart throughout.. IVC above anastomosis:  104 cm/sec. IVC at anastomosis:  80 cm/sec. Intrahepatic IVC:  57 cm/sec. Extrahepatic IVC:  39 cm/sec. Right, mid, left hepatic veins: Patent with flow towards the inferior vena cava. Extrahepatic hepatic artery: Improved appearance of the extrahepatic waveforms, now with low-resistance appearance with flow towards the liver. 109 cm/sec with resistive index 0.76, previously 1.0 on immediate postoperative liver transplant ultrasound 3/5/2017 Right hepatic artery: 84 cm/sec with resistive index 0.72. Left hepatic artery: 42 cm/sec with resistive index 0.74.     Impression: 1.   Unremarkable appearance of the transplant liver. 2.  Normal Doppler evaluation of the post transplant liver. Interval improvement in waveform and resistive indices of the extrahepatic artery compared to the immediate postoperative ultrasound on 3/5/2017. I have personally reviewed the examination and initial interpretation and I agree with the findings. DALLIN RENAE MD    Xr Chest Port 1 View    Result Date: 7/7/2017  Exam: XR CHEST PORT 1 VW  7/7/2017 1:39 AM  History: intubated Comparison: Radiograph 7/6/2017 Findings: Stable positioning of endotracheal tube, enteric tube, and right-sided PICC. Cardiac silhouette is obscured and unchanged. Trachea is midline. Unchanged bilateral pleural effusions. No pneumothorax. Bibasilar opacities. Visualized abdomen is normal.     Impression: Unchanged bilateral pleural effusions with associated bibasilar atelectasis versus consolidation. I have personally reviewed the examination and initial interpretation and I agree with the findings. BASILIO SAGASTUME MD    Xr Chest Port 1 View    Result Date: 7/6/2017  Exam: XR CHEST PORT 1 VW, 7/6/2017 9:26 AM Indication: follow up Comparison: 7/5/2017. Findings: Single AP view the chest. ET tube tip approximately 6.1 cm from the chacorta, right IJ central venous catheter with tip at the SVC, and enteric tube with tip outside the field-of-view. Surgical clips over the right upper quadrant. Stable low lung volumes. Cardiac silhouette is unchanged in largely obscured. Pulmonary vascular is indistinct. Increased layering right-sided pleural effusion and stable small left-sided pleural effusion. Increase in diffuse airspace opacities. No pneumothorax.     Impression: 1. Low lung volumes with increasing diffuse airspace opacities, right lung predominant. Worsening pulmonary edema with underlying infection versus atelectasis. 2. Increased small to moderate layering right-sided pleural effusion and small left-sided pleural effusion. I have  personally reviewed the examination and initial interpretation and I agree with the findings. DAMIEN LIRA MD    Xr Chest Port 1 View    Result Date: 7/5/2017  XR CHEST PORT 1 VW  7/5/2017 10:47 AM  HISTORY: follow up COMPARISON: 7/4/2017 FINDINGS: ET tube tip projects over the distal trachea, approximately 3.8 cm from the chacorta. Right IJ approach intravenous catheter tip projects over the mid SVC. Enteric tube passes inferior left hemidiaphragm, tip out of the field of view. Surgical clips project over the right upper quadrant/right lung base. No pneumothorax. Bilateral pleural effusions. Bibasilar airspace opacities. Cardiac silhouette is not enlarged.     IMPRESSION: 1. Increasingly prominent bilateral pleural effusions. 2. Diffuse patchy airspace opacities, which may represent pulmonary edema or pneumonia. I have personally reviewed the examination and initial interpretation and I agree with the findings. DAMIEN LIRA MD    Xr Chest Port 1 View    Result Date: 7/4/2017  EXAM: XR CHEST PORT 1 VW  7/4/2017 6:48 PM  HISTORY: ET tube placement after 3cm advance COMPARISON: Chest x-ray from earlier today 7/4/2017 2:06 PM FINDINGS: Portable AP views of the chest obtained. Endotracheal tube tip projects 5.3 cm from the chacorta. Right IJ central line tip projects over the mid SVC. Enteric tube courses beyond the margins of the film. The trachea is midline. The cardiac silhouette is within normal limits. Increased hazy opacification of the right hemithorax, suggestive of underlying pleural fluid. Increased left pleural effusion. Unchanged patchy bibasilar opacities.     IMPRESSION: 1. Endotracheal tube tip projects 5.3 cm from the chacorta. 2. Increased bilateral pleural effusions, right greater than left. 3. Unchanged bibasilar patchy opacities. I have personally reviewed the examination and initial interpretation and I agree with the findings. ROB STARK MD    Xr Chest Port 1 View    Result Date:  7/4/2017  EXAM: XR CHEST PORT 1 VW  7/4/2017 2:13 PM  HISTORY: endotracheal tube positioning COMPARISON: Chest x-ray 3/6/2017 FINDINGS: Portable AP view of the chest obtained. The endotracheal tube tip projects 7 cm from the chacorta. Enteric tube courses beyond the margins of film. Right IJ central line tip projects over the mid SVC. The trachea is midline. The cardiac silhouette is within normal limits. Low lung volumes. Hazy opacification of the costophrenic angles bilaterally. No pneumothorax. Mild left streaky opacities.     IMPRESSION: 1. Endotracheal tube tip projects 7 cm from the chacorta. 2. Bilateral pleural effusions with mild overlying opacities, most likely atelectasis. I have personally reviewed the examination and initial interpretation and I agree with the findings. ROB STARK MD    Xr Abdomen Port 2 Views    Result Date: 7/4/2017  EXAM: XR ABDOMEN PORT 2 VW  7/4/2017 7:40 AM  HISTORY: to rule out intestinal perforation and free air COMPARISON: X-ray 4/12/2017 FINDINGS: Portable AP views of the abdomen obtained. Dilated loop of bowel in the central abdomen, which appears to contain stool, likely sigmoid. No evidence of free intraperitoneal air. Mild-to-moderate colonic stool burden. Surgical clips in the right upper quadrant. Degenerative changes of the lumbar spine.     IMPRESSION: 1. No evidence of pneumoperitoneum. 2. Dilated loop of bowel in the central abdomen, likely sigmoid. I have personally reviewed the examination and initial interpretation and I agree with the findings. MD Zane EDWARDS Ph.D., MS3  Provider: Dr. Gordillo

## 2017-07-07 NOTE — PROGRESS NOTES
Transplant Social Work  D: I attempted to see  this afternoon. He was sleeping comfortably, and no family members were present at that time.   I: Chart review.   P: I remain available for psychosocial support, as needed. (pager [phone])

## 2017-07-07 NOTE — PLAN OF CARE
Problem: Sepsis (Adult)  Goal: Signs and Symptoms of Listed Potential Problems Will be Absent or Manageable (Sepsis)  Signs and symptoms of listed potential problems will be absent or manageable by discharge/transition of care (reference Sepsis (Adult) CPG).   Outcome: Improving  D: 51 y.o M admitted with septic shock.   I/A: T max 99.6. HR 's, occasional PAC noted. MAP >65, levophed off all shift. CVP 15-11-12. Neuro: pt lethargic, fentanyl gtt continued,moves extremities when restless, following command to blink at times. Unable to move extremities on command. Eyes tracks periodically. Vent 30%/22/5/500. RR 22-28. PEAK's 20-24. Increased amount of secretions from ETT, cloudy white secretions. UO increased today, 50-60 mL/hr. No blood products overnight. Magdy WDL.   P: Continue to monitor.

## 2017-07-08 ENCOUNTER — APPOINTMENT (OUTPATIENT)
Dept: GENERAL RADIOLOGY | Facility: CLINIC | Age: 52
End: 2017-07-08
Attending: TRANSPLANT SURGERY
Payer: COMMERCIAL

## 2017-07-08 ENCOUNTER — APPOINTMENT (OUTPATIENT)
Dept: CT IMAGING | Facility: CLINIC | Age: 52
End: 2017-07-08
Attending: SURGERY
Payer: COMMERCIAL

## 2017-07-08 LAB
ALBUMIN SERPL-MCNC: 2 G/DL (ref 3.4–5)
ALP SERPL-CCNC: 48 U/L (ref 40–150)
ALT SERPL W P-5'-P-CCNC: 15 U/L (ref 0–70)
ANION GAP SERPL CALCULATED.3IONS-SCNC: 10 MMOL/L (ref 3–14)
ANION GAP SERPL CALCULATED.3IONS-SCNC: 10 MMOL/L (ref 3–14)
ANISOCYTOSIS BLD QL SMEAR: SLIGHT
APTT PPP: 38 SEC (ref 22–37)
AST SERPL W P-5'-P-CCNC: 29 U/L (ref 0–45)
BASE DEFICIT BLDA-SCNC: 7.2 MMOL/L
BASOPHILS # BLD AUTO: 0 10E9/L (ref 0–0.2)
BASOPHILS NFR BLD AUTO: 0 %
BASOPHILS NFR BLD AUTO: 0 %
BASOPHILS NFR BLD AUTO: 0.9 %
BILIRUB DIRECT SERPL-MCNC: 0.3 MG/DL (ref 0–0.2)
BILIRUB SERPL-MCNC: 0.7 MG/DL (ref 0.2–1.3)
BUN SERPL-MCNC: 102 MG/DL (ref 7–30)
BUN SERPL-MCNC: 90 MG/DL (ref 7–30)
CALCIUM SERPL-MCNC: 8.4 MG/DL (ref 8.5–10.1)
CALCIUM SERPL-MCNC: 8.7 MG/DL (ref 8.5–10.1)
CHLORIDE SERPL-SCNC: 117 MMOL/L (ref 94–109)
CHLORIDE SERPL-SCNC: 118 MMOL/L (ref 94–109)
CO2 SERPL-SCNC: 19 MMOL/L (ref 20–32)
CO2 SERPL-SCNC: 19 MMOL/L (ref 20–32)
CREAT SERPL-MCNC: 2.48 MG/DL (ref 0.66–1.25)
CREAT SERPL-MCNC: 2.56 MG/DL (ref 0.66–1.25)
DIFFERENTIAL METHOD BLD: ABNORMAL
ELLIPTOCYTES BLD QL SMEAR: SLIGHT
EOSINOPHIL # BLD AUTO: 0 10E9/L (ref 0–0.7)
EOSINOPHIL # BLD AUTO: 0.1 10E9/L (ref 0–0.7)
EOSINOPHIL # BLD AUTO: 0.2 10E9/L (ref 0–0.7)
EOSINOPHIL NFR BLD AUTO: 0.9 %
EOSINOPHIL NFR BLD AUTO: 1.8 %
EOSINOPHIL NFR BLD AUTO: 3.6 %
ERYTHROCYTE [DISTWIDTH] IN BLOOD BY AUTOMATED COUNT: 15.4 % (ref 10–15)
ERYTHROCYTE [DISTWIDTH] IN BLOOD BY AUTOMATED COUNT: 15.4 % (ref 10–15)
ERYTHROCYTE [DISTWIDTH] IN BLOOD BY AUTOMATED COUNT: 15.7 % (ref 10–15)
FIBRINOGEN PPP-MCNC: 719 MG/DL (ref 200–420)
GFR SERPL CREATININE-BSD FRML MDRD: 26 ML/MIN/1.7M2
GFR SERPL CREATININE-BSD FRML MDRD: 27 ML/MIN/1.7M2
GLUCOSE BLDC GLUCOMTR-MCNC: 126 MG/DL (ref 70–99)
GLUCOSE BLDC GLUCOMTR-MCNC: 134 MG/DL (ref 70–99)
GLUCOSE BLDC GLUCOMTR-MCNC: 135 MG/DL (ref 70–99)
GLUCOSE BLDC GLUCOMTR-MCNC: 140 MG/DL (ref 70–99)
GLUCOSE BLDC GLUCOMTR-MCNC: 143 MG/DL (ref 70–99)
GLUCOSE BLDC GLUCOMTR-MCNC: 144 MG/DL (ref 70–99)
GLUCOSE BLDC GLUCOMTR-MCNC: 147 MG/DL (ref 70–99)
GLUCOSE BLDC GLUCOMTR-MCNC: 148 MG/DL (ref 70–99)
GLUCOSE BLDC GLUCOMTR-MCNC: 158 MG/DL (ref 70–99)
GLUCOSE BLDC GLUCOMTR-MCNC: 158 MG/DL (ref 70–99)
GLUCOSE BLDC GLUCOMTR-MCNC: 162 MG/DL (ref 70–99)
GLUCOSE BLDC GLUCOMTR-MCNC: 169 MG/DL (ref 70–99)
GLUCOSE BLDC GLUCOMTR-MCNC: 172 MG/DL (ref 70–99)
GLUCOSE BLDC GLUCOMTR-MCNC: 177 MG/DL (ref 70–99)
GLUCOSE SERPL-MCNC: 137 MG/DL (ref 70–99)
GLUCOSE SERPL-MCNC: 183 MG/DL (ref 70–99)
HCO3 BLD-SCNC: 18 MMOL/L (ref 21–28)
HCT VFR BLD AUTO: 23.5 % (ref 40–53)
HCT VFR BLD AUTO: 23.9 % (ref 40–53)
HCT VFR BLD AUTO: 24.3 % (ref 40–53)
HGB BLD-MCNC: 7.5 G/DL (ref 13.3–17.7)
HGB BLD-MCNC: 7.9 G/DL (ref 13.3–17.7)
HGB BLD-MCNC: 8 G/DL (ref 13.3–17.7)
INR PPP: 1.22 (ref 0.86–1.14)
LACTATE BLD-SCNC: 1.4 MMOL/L (ref 0.7–2.1)
LYMPHOCYTES # BLD AUTO: 0.3 10E9/L (ref 0.8–5.3)
LYMPHOCYTES # BLD AUTO: 0.4 10E9/L (ref 0.8–5.3)
LYMPHOCYTES # BLD AUTO: 0.5 10E9/L (ref 0.8–5.3)
LYMPHOCYTES NFR BLD AUTO: 11.4 %
LYMPHOCYTES NFR BLD AUTO: 14 %
LYMPHOCYTES NFR BLD AUTO: 5.5 %
MAGNESIUM SERPL-MCNC: 2.2 MG/DL (ref 1.6–2.3)
MAGNESIUM SERPL-MCNC: 2.4 MG/DL (ref 1.6–2.3)
MAGNESIUM SERPL-MCNC: 2.4 MG/DL (ref 1.6–2.3)
MCH RBC QN AUTO: 27.9 PG (ref 26.5–33)
MCH RBC QN AUTO: 28.8 PG (ref 26.5–33)
MCH RBC QN AUTO: 28.9 PG (ref 26.5–33)
MCHC RBC AUTO-ENTMCNC: 31.9 G/DL (ref 31.5–36.5)
MCHC RBC AUTO-ENTMCNC: 32.9 G/DL (ref 31.5–36.5)
MCHC RBC AUTO-ENTMCNC: 33.1 G/DL (ref 31.5–36.5)
MCV RBC AUTO: 87 FL (ref 78–100)
MCV RBC AUTO: 87 FL (ref 78–100)
MCV RBC AUTO: 88 FL (ref 78–100)
METAMYELOCYTES # BLD: 0.1 10E9/L
METAMYELOCYTES # BLD: 0.2 10E9/L
METAMYELOCYTES # BLD: 0.2 10E9/L
METAMYELOCYTES NFR BLD MANUAL: 2.6 %
METAMYELOCYTES NFR BLD MANUAL: 2.7 %
METAMYELOCYTES NFR BLD MANUAL: 7 %
MONOCYTES # BLD AUTO: 0.2 10E9/L (ref 0–1.3)
MONOCYTES # BLD AUTO: 0.3 10E9/L (ref 0–1.3)
MONOCYTES # BLD AUTO: 0.5 10E9/L (ref 0–1.3)
MONOCYTES NFR BLD AUTO: 16.7 %
MONOCYTES NFR BLD AUTO: 3.6 %
MONOCYTES NFR BLD AUTO: 7 %
MYELOCYTES # BLD: 0.1 10E9/L
MYELOCYTES NFR BLD MANUAL: 0.9 %
MYELOCYTES NFR BLD MANUAL: 1.8 %
MYELOCYTES NFR BLD MANUAL: 2.6 %
NEUTROPHILS # BLD AUTO: 1.8 10E9/L (ref 1.6–8.3)
NEUTROPHILS # BLD AUTO: 3 10E9/L (ref 1.6–8.3)
NEUTROPHILS # BLD AUTO: 5.3 10E9/L (ref 1.6–8.3)
NEUTROPHILS NFR BLD AUTO: 58.7 %
NEUTROPHILS NFR BLD AUTO: 74.6 %
NEUTROPHILS NFR BLD AUTO: 83.7 %
O2/TOTAL GAS SETTING VFR VENT: 30 %
OXYHGB MFR BLD: 95 % (ref 92–100)
PCO2 BLD: 35 MM HG (ref 35–45)
PH BLD: 7.32 PH (ref 7.35–7.45)
PHOSPHATE SERPL-MCNC: 2 MG/DL (ref 2.5–4.5)
PHOSPHATE SERPL-MCNC: 2.2 MG/DL (ref 2.5–4.5)
PHOSPHATE SERPL-MCNC: 2.3 MG/DL (ref 2.5–4.5)
PLATELET # BLD AUTO: 34 10E9/L (ref 150–450)
PLATELET # BLD AUTO: 35 10E9/L (ref 150–450)
PLATELET # BLD AUTO: 46 10E9/L (ref 150–450)
PLATELET # BLD EST: ABNORMAL 10*3/UL
PO2 BLD: 96 MM HG (ref 80–105)
POIKILOCYTOSIS BLD QL SMEAR: SLIGHT
POLYCHROMASIA BLD QL SMEAR: SLIGHT
POTASSIUM SERPL-SCNC: 3.4 MMOL/L (ref 3.4–5.3)
POTASSIUM SERPL-SCNC: 3.5 MMOL/L (ref 3.4–5.3)
POTASSIUM SERPL-SCNC: 3.7 MMOL/L (ref 3.4–5.3)
PROT SERPL-MCNC: 5 G/DL (ref 6.8–8.8)
RBC # BLD AUTO: 2.69 10E12/L (ref 4.4–5.9)
RBC # BLD AUTO: 2.73 10E12/L (ref 4.4–5.9)
RBC # BLD AUTO: 2.78 10E12/L (ref 4.4–5.9)
SODIUM SERPL-SCNC: 146 MMOL/L (ref 133–144)
SODIUM SERPL-SCNC: 146 MMOL/L (ref 133–144)
TACROLIMUS BLD-MCNC: 5.1 UG/L (ref 5–15)
TME LAST DOSE: NORMAL H
WBC # BLD AUTO: 3 10E9/L (ref 4–11)
WBC # BLD AUTO: 4 10E9/L (ref 4–11)
WBC # BLD AUTO: 6.3 10E9/L (ref 4–11)

## 2017-07-08 PROCEDURE — 80197 ASSAY OF TACROLIMUS: CPT | Performed by: PHYSICIAN ASSISTANT

## 2017-07-08 PROCEDURE — 85025 COMPLETE CBC W/AUTO DIFF WBC: CPT | Performed by: PHYSICIAN ASSISTANT

## 2017-07-08 PROCEDURE — 85384 FIBRINOGEN ACTIVITY: CPT | Performed by: SURGERY

## 2017-07-08 PROCEDURE — 85610 PROTHROMBIN TIME: CPT | Performed by: SURGERY

## 2017-07-08 PROCEDURE — 85730 THROMBOPLASTIN TIME PARTIAL: CPT | Performed by: SURGERY

## 2017-07-08 PROCEDURE — 99291 CRITICAL CARE FIRST HOUR: CPT | Mod: GC | Performed by: SURGERY

## 2017-07-08 PROCEDURE — 25000128 H RX IP 250 OP 636: Performed by: SURGERY

## 2017-07-08 PROCEDURE — 25000131 ZZH RX MED GY IP 250 OP 636 PS 637: Performed by: SURGERY

## 2017-07-08 PROCEDURE — 71250 CT THORAX DX C-: CPT

## 2017-07-08 PROCEDURE — 80076 HEPATIC FUNCTION PANEL: CPT | Performed by: PHYSICIAN ASSISTANT

## 2017-07-08 PROCEDURE — 00000146 ZZHCL STATISTIC GLUCOSE BY METER IP

## 2017-07-08 PROCEDURE — 80048 BASIC METABOLIC PNL TOTAL CA: CPT | Performed by: PHYSICIAN ASSISTANT

## 2017-07-08 PROCEDURE — 84100 ASSAY OF PHOSPHORUS: CPT | Performed by: SURGERY

## 2017-07-08 PROCEDURE — 25000125 ZZHC RX 250: Performed by: PHYSICIAN ASSISTANT

## 2017-07-08 PROCEDURE — 40000196 ZZH STATISTIC RAPCV CVP MONITORING

## 2017-07-08 PROCEDURE — 40000014 ZZH STATISTIC ARTERIAL MONITORING DAILY

## 2017-07-08 PROCEDURE — 80048 BASIC METABOLIC PNL TOTAL CA: CPT | Performed by: SURGERY

## 2017-07-08 PROCEDURE — 40000275 ZZH STATISTIC RCP TIME EA 10 MIN

## 2017-07-08 PROCEDURE — 25000125 ZZHC RX 250: Performed by: TRANSPLANT SURGERY

## 2017-07-08 PROCEDURE — 83735 ASSAY OF MAGNESIUM: CPT | Performed by: STUDENT IN AN ORGANIZED HEALTH CARE EDUCATION/TRAINING PROGRAM

## 2017-07-08 PROCEDURE — 84132 ASSAY OF SERUM POTASSIUM: CPT | Performed by: STUDENT IN AN ORGANIZED HEALTH CARE EDUCATION/TRAINING PROGRAM

## 2017-07-08 PROCEDURE — 94003 VENT MGMT INPAT SUBQ DAY: CPT

## 2017-07-08 PROCEDURE — 25000125 ZZHC RX 250: Performed by: SURGERY

## 2017-07-08 PROCEDURE — 25000125 ZZHC RX 250: Performed by: STUDENT IN AN ORGANIZED HEALTH CARE EDUCATION/TRAINING PROGRAM

## 2017-07-08 PROCEDURE — 20000004 ZZH R&B ICU UMMC

## 2017-07-08 PROCEDURE — 25000132 ZZH RX MED GY IP 250 OP 250 PS 637: Performed by: STUDENT IN AN ORGANIZED HEALTH CARE EDUCATION/TRAINING PROGRAM

## 2017-07-08 PROCEDURE — 83605 ASSAY OF LACTIC ACID: CPT | Performed by: STUDENT IN AN ORGANIZED HEALTH CARE EDUCATION/TRAINING PROGRAM

## 2017-07-08 PROCEDURE — 83735 ASSAY OF MAGNESIUM: CPT | Performed by: SURGERY

## 2017-07-08 PROCEDURE — 25000132 ZZH RX MED GY IP 250 OP 250 PS 637

## 2017-07-08 PROCEDURE — 25000128 H RX IP 250 OP 636: Performed by: PHYSICIAN ASSISTANT

## 2017-07-08 PROCEDURE — 71010 XR CHEST PORT 1 VW: CPT

## 2017-07-08 PROCEDURE — 84100 ASSAY OF PHOSPHORUS: CPT | Performed by: PHYSICIAN ASSISTANT

## 2017-07-08 PROCEDURE — 82805 BLOOD GASES W/O2 SATURATION: CPT | Performed by: SURGERY

## 2017-07-08 PROCEDURE — 27210995 ZZH RX 272: Performed by: SURGERY

## 2017-07-08 PROCEDURE — 84100 ASSAY OF PHOSPHORUS: CPT | Performed by: STUDENT IN AN ORGANIZED HEALTH CARE EDUCATION/TRAINING PROGRAM

## 2017-07-08 PROCEDURE — 85025 COMPLETE CBC W/AUTO DIFF WBC: CPT | Performed by: SURGERY

## 2017-07-08 PROCEDURE — 25000131 ZZH RX MED GY IP 250 OP 636 PS 637: Performed by: TRANSPLANT SURGERY

## 2017-07-08 PROCEDURE — 83735 ASSAY OF MAGNESIUM: CPT | Performed by: PHYSICIAN ASSISTANT

## 2017-07-08 PROCEDURE — 40000281 ZZH STATISTIC TRANSPORT TIME EA 15 MIN

## 2017-07-08 RX ORDER — FUROSEMIDE 10 MG/ML
40 INJECTION INTRAMUSCULAR; INTRAVENOUS 3 TIMES DAILY
Status: DISCONTINUED | OUTPATIENT
Start: 2017-07-08 | End: 2017-07-08

## 2017-07-08 RX ORDER — DEXMEDETOMIDINE HYDROCHLORIDE 4 UG/ML
.2-1.2 INJECTION, SOLUTION INTRAVENOUS CONTINUOUS
Status: DISCONTINUED | OUTPATIENT
Start: 2017-07-08 | End: 2017-07-09

## 2017-07-08 RX ORDER — FUROSEMIDE 10 MG/ML
60 INJECTION INTRAMUSCULAR; INTRAVENOUS ONCE
Status: COMPLETED | OUTPATIENT
Start: 2017-07-08 | End: 2017-07-08

## 2017-07-08 RX ORDER — FUROSEMIDE 10 MG/ML
40 INJECTION INTRAMUSCULAR; INTRAVENOUS EVERY 8 HOURS
Status: DISCONTINUED | OUTPATIENT
Start: 2017-07-08 | End: 2017-07-08

## 2017-07-08 RX ADMIN — HUMAN INSULIN 2 UNITS/HR: 100 INJECTION, SOLUTION SUBCUTANEOUS at 13:47

## 2017-07-08 RX ADMIN — FUROSEMIDE 40 MG: 10 INJECTION, SOLUTION INTRAVENOUS at 09:43

## 2017-07-08 RX ADMIN — TACROLIMUS 4 MG: 5 CAPSULE ORAL at 07:53

## 2017-07-08 RX ADMIN — PANTOPRAZOLE SODIUM 40 MG: 40 INJECTION, POWDER, FOR SOLUTION INTRAVENOUS at 20:20

## 2017-07-08 RX ADMIN — FUROSEMIDE 60 MG: 10 INJECTION, SOLUTION INTRAVENOUS at 21:45

## 2017-07-08 RX ADMIN — I.V. FAT EMULSION 250 ML: 20 EMULSION INTRAVENOUS at 20:19

## 2017-07-08 RX ADMIN — POTASSIUM CHLORIDE 20 MEQ: 29.8 INJECTION, SOLUTION INTRAVENOUS at 13:59

## 2017-07-08 RX ADMIN — FUROSEMIDE 40 MG: 10 INJECTION, SOLUTION INTRAVENOUS at 13:59

## 2017-07-08 RX ADMIN — FENTANYL CITRATE 150 MCG/HR: 50 INJECTION, SOLUTION INTRAMUSCULAR; INTRAVENOUS at 03:23

## 2017-07-08 RX ADMIN — MAGNESIUM SULFATE HEPTAHYDRATE: 500 INJECTION, SOLUTION INTRAMUSCULAR; INTRAVENOUS at 20:19

## 2017-07-08 RX ADMIN — POTASSIUM CHLORIDE 20 MEQ: 1.5 POWDER, FOR SOLUTION ORAL at 02:08

## 2017-07-08 RX ADMIN — FENTANYL CITRATE 75 MCG/HR: 50 INJECTION, SOLUTION INTRAMUSCULAR; INTRAVENOUS at 13:46

## 2017-07-08 RX ADMIN — CHLOROTHIAZIDE SODIUM 1000 MG: 500 INJECTION, POWDER, LYOPHILIZED, FOR SOLUTION INTRAVENOUS at 20:17

## 2017-07-08 RX ADMIN — DEXMEDETOMIDINE HYDROCHLORIDE 0.5 MCG/KG/HR: 4 INJECTION, SOLUTION INTRAVENOUS at 09:43

## 2017-07-08 RX ADMIN — POTASSIUM CHLORIDE 20 MEQ: 29.8 INJECTION, SOLUTION INTRAVENOUS at 18:11

## 2017-07-08 RX ADMIN — POTASSIUM CHLORIDE 20 MEQ: 1.5 POWDER, FOR SOLUTION ORAL at 06:18

## 2017-07-08 RX ADMIN — TACROLIMUS 3 MG: 5 CAPSULE ORAL at 18:11

## 2017-07-08 RX ADMIN — DEXMEDETOMIDINE HYDROCHLORIDE 0.8 MCG/KG/HR: 4 INJECTION, SOLUTION INTRAVENOUS at 20:42

## 2017-07-08 RX ADMIN — ERTAPENEM SODIUM 1 G: 1 INJECTION, POWDER, LYOPHILIZED, FOR SOLUTION INTRAMUSCULAR; INTRAVENOUS at 09:57

## 2017-07-08 RX ADMIN — FLUDROCORTISONE ACETATE 0.1 MG: 0.1 TABLET ORAL at 07:53

## 2017-07-08 NOTE — PLAN OF CARE
Problem: Sepsis (Adult)  Goal: Signs and Symptoms of Listed Potential Problems Will be Absent or Manageable (Sepsis)  Signs and symptoms of listed potential problems will be absent or manageable by discharge/transition of care (reference Sepsis (Adult) CPG).   Outcome: No Change  Neuro status unchanged, patient not following or responding to commands; moving all extremities independently. PERRLA intact. Fent drip at 150mcg/hr - patient became hypertensive  w/ sedation holiday of 50%. Pulses palpable throughout; fingers and toes cyanotic - most notably right index finger, MD aware. HR sinus rhythm w/ occasional PVC's. BP hypertensive w/ turns/activity and sedation wean, otherwise stable off pressors, a-line w/ good waveforms. Lungs w/ thick secretions, suctioned Q1hr, plan for PS trial this AM if tolerates sedation holiday. Urine output adequate, lasix given x1. NG w/ bilious output. Small BMx1. ABD incisions w/ staples ROC.

## 2017-07-08 NOTE — PROGRESS NOTES
Nephrology Progress Note  07/08/2017         Assessment & Recommendations:   Camacho Bhagat is a 51 year old man with h/o cirrhosis s/p OLT 3/2017 who is admitted with neutropenia and colitis, s/p exlap, no bowel removed but did have large insensible losses and shock requiring pressor support  1) acute kidney injury - baseline creatinine is 1.5 - 1.8 mg/dL since OLT 3/2017.  He is non-oliguric and creatinine is stable at 2.56.    2) nongap metabolic acidosis  - bicarb mildly worse at 19 today.  Please start sodium bicarbonate tabs 650 mg NG TID  3) anemia due to illness, IS and blood loss etc - hematology consult noted associated pancytopenia and thrombocytopenia likely related to infection and meds etc.  Hemoglobin is stable at 7.9  4) hypervolemia - good response to furosemide 40 mg IV so will continue furosemide 40 mg IV TID    5: hyperkalemia is no longer an active issue    Recs:  Sodium bicarbonate 650 mg TID  Furosemide 40 mg IV TID    Discussed with primary team, EVELINE Hernández MD  [facility]  Department of Medicine  Division of Renal Disease and Hypertension  [phone]     Interval History :   In the last 24 hours Camacho Bhagat received IV furosemide, 20 mgx2 doses then 40 mg IV x1.  He had UOP increase after 40 mg dose.  Creatinine is stable at 2.56 mg/dL.  Still has intermittent elevated temps up to 100 F.  Remains intubated and sedated   Review of Systems:   Cannot obtain due to intubated sedated status    Physical Exam:   I/O last 3 completed shifts:  In: 3209.99 [I.V.:714.82; NG/GT:50]  Out: 2970 [Urine:2520; Emesis/NG output:450]   /58  Pulse 101  Temp 100  F (37.8  C) (Axillary)  Resp 22  Ht 1.829 m (6')  Wt 101.4 kg (223 lb 8.7 oz)  SpO2 96%  BMI 30.32 kg/m2     GENERAL APPEARANCE: intubated, sedated  EYES:  no scleral icterus, pupils equal  HENT: ET tube  PULM: lungs clear to auscultation bilaterally, equal air movement, no clubbing  CV: regular  rhythm, normal rate, no rub     -JVP not elevated     -edema trace   Neuro - sedated  Lines right IJ    Labs:   All labs reviewed by me  Electrolytes/Renal -   Recent Labs   Lab Test  07/08/17   1238  07/08/17   0330  07/07/17 2309 07/07/17   1146  07/07/17   0309   NA   --   146*  145*  144  144   POTASSIUM  3.5  3.7  3.6  3.9  4.0   CHLORIDE   --   117*  116*  116*  117*   CO2   --   19*  20  19*  20   BUN   --   90*  90*  83*  76*   CR   --   2.56*  2.61*  2.61*  2.71*   GLC   --   137*  129*  135*  142*   JACOB   --   8.4*  8.0*  8.0*  7.6*   MAG  2.4*  2.2   --    --   2.2   PHOS  2.0*  2.2*   --    --   3.7       CBC -   Recent Labs   Lab Test  07/08/17   0330 07/07/17   2309 07/07/17   1146   WBC  4.0  3.0*  1.7*   HGB  7.9*  7.5*  7.4*   PLT  35*  34*  32*       LFTs -   Recent Labs   Lab Test  07/08/17   0330  07/07/17   0309  07/05/17 2026   ALKPHOS  48  35*  29*   BILITOTAL  0.7  0.6  0.7   ALT  15  16  16   AST  29  25  24   PROTTOTAL  5.0*  4.7*  4.5*   ALBUMIN  2.0*  2.2*  2.4*       Iron Panel -   Recent Labs   Lab Test  02/08/16   1144   IRON  93   IRONSAT  41         Imaging:  All imaging studies reviewed by me.     Current Medications:    furosemide  40 mg Intravenous Q8H     ertapenem (INVanz) IV  1 g Intravenous Q24H     lipids  250 mL Intravenous Q24H     pantoprazole  40 mg Intravenous Q24H     fludrocortisone  0.1 mg Oral Daily     sodium chloride (PF)  3 mL Intracatheter Q8H     tacrolimus  3 mg Oral QPM     tacrolimus  4 mg Oral QAM       dexmedetomidine 0.3 mcg/kg/hr (07/08/17 1650)     parenteral nutrition - ADULT compounded formula       parenteral nutrition - ADULT compounded formula 65 mL/hr at 07/07/17 1941     NaCl 10 mL/hr at 07/07/17 1600     IV fluid REPLACEMENT ONLY       insulin (regular) 3.5 Units/hr (07/08/17 1650)     fentaNYL 75 mcg/hr (07/08/17 1346)     IV fluid REPLACEMENT ONLY       Ninfa Hernández MD

## 2017-07-08 NOTE — PLAN OF CARE
Problem: Goal Outcome Summary  Goal: Goal Outcome Summary  Outcome: No Change  Precedex added to wean fentanyl, still not following commands. Precedex at .5mcg/kg/min and fentanyl lowered to 50mcg/hr. Moves extremities independently, withdraws to pain. Minimal vent settings, 30%, large white thick secretions with suction. Pt placed on PS at 1830, so far tolerating well. Diuresing with lasix Q8H, electrolyte replacement as needed. TPN 65ml/hr.   Plan: continue to wean to extubate as tolerated.

## 2017-07-08 NOTE — PROGRESS NOTES
SICU Progress Note  Primary Service: Liver Transplant  07/08/2017      Summary:  51 year old man s/p liver transplant March 2017 after CASTAÑEDA admitted on 7/4 for abdominal pain and fever found to have neutropenic colitis  7/4/2017 Exploratory laparotomy due to concern for perforation, viable hepatic flexure   7/5/2017 Abdominal washout and closure  7/5/2017  Left axillary arterial line, bronchoscopy and BAL   7/7/2017  Off pressors      Events/Changes:  - Diuresis today  - Wean fentanyl to off  - Start precedex   - Trial of pressure support    Subjective:  Agitation overnight when sedation is weaned (becomes hypertensive)  ANC and blood indices improved     On Exam:  /58  Pulse 101  Temp 100  F (37.8  C) (Axillary)  Resp 22  Ht 1.829 m (6')  Wt 101.4 kg (223 lb 8.7 oz)  SpO2 97%  BMI 30.32 kg/m2     Ventilation Mode: CMV/AC  FiO2 (%): 30 %  Rate Set (breaths/minute): 22 breaths/min  Tidal Volume Set (mL): 500 mL  PEEP (cm H2O): 5 cmH2O  Pressure Support (cm H2O): 7 cmH2O  Oxygen Concentration (%): 30 %  Resp: 22    I/O last 3 completed shifts:  In: 3209.99 [I.V.:714.82; NG/GT:50]  Out: 2970 [Urine:2520; Emesis/NG output:450]    Drips:  Fentanyl, Precedex     Constitutional: Intubated and sedate   Cardiac:  Sinus tachycardia, no murmurs noted   Respiratory:  Clear sounds   Abdomen:  Incision c/d/i, staples in place, no drainage   Genitourinary: Crowe in place, scrotal edema   Extremities: Pitting edema, right index fingertip and toe tips ar dusky, minimally improved since pressors were stopped   Neurologic: Sedated at time of exam       Metabolic Panel  Recent Labs  Lab 07/08/17  1649 07/08/17  1238 07/08/17  0330 07/07/17  2309 07/07/17  1146 07/07/17  0309 07/06/17  2334   *  --  146* 145* 144 144  --    POTASSIUM 3.4 3.5 3.7 3.6 3.9 4.0  --    CHLORIDE 118*  --  117* 116* 116* 117*  --    CO2 19*  --  19* 20 19* 20  --    *  --  90* 90* 83* 76*  --    CR 2.48*  --  2.56* 2.61* 2.61* 2.71*   --    *  --  137* 129* 135* 142*  --    JACOB 8.7  --  8.4* 8.0* 8.0* 7.6*  --    MAG 2.4* 2.4* 2.2  --   --  2.2  --    PHOS 2.3* 2.0* 2.2*  --   --  3.7  --    LACT  --   --  1.4  --  1.2 1.2 1.2       LFT  Recent Labs  Lab 07/08/17  0902 07/08/17  0330 07/07/17  0309 07/06/17 0316 07/06/17 0011 07/05/17 2053 07/05/17 2026 07/05/17  1043   AST  --  29 25  --   --   --  24 30   ALT  --  15 16  --   --   --  16 19   ALKPHOS  --  48 35*  --   --   --  29* 37*   BILITOTAL  --  0.7 0.6  --   --   --  0.7 0.6   ALBUMIN  --  2.0* 2.2*  --   --   --  2.4* 2.4*   INR 1.22*  --   --  1.80* 1.91* 1.92*  --  2.37*       Coags  Recent Labs  Lab 07/08/17 0902 07/06/17 0316 07/06/17 0011 07/05/17 2053   INR 1.22* 1.80* 1.91* 1.92*   PTT 38* 47* 48* 52*   FIBR 719* 538* 527* 470*       CBC  Recent Labs  Lab 07/08/17  1649 07/08/17  0330 07/07/17  2309 07/07/17  1146   WBC 6.3 4.0 3.0* 1.7*   HGB 8.0* 7.9* 7.5* 7.4*   PLT 46* 35* 34* 32*       ABG  Recent Labs  Lab 07/08/17  0902 07/06/17  2334 07/06/17  0015 07/05/17 2026   PH 7.32* 7.26* 7.27* 7.27*   PCO2 35 37 35 35   PO2 96 85 109* 155*   HCO3 18* 17* 16* 16*   O2PER 30 30.0 30.0 40       VBG  Recent Labs  Lab 07/08/17  0902 07/06/17  2334 07/06/17  0015 07/05/17  2026   O2PER 30 30.0 30.0 40     Imaging:   Recent Results (from the past 24 hour(s))   XR Chest Port 1 View    Narrative    Exam: Chest x-ray  7/8/2017 4:42 AM      History: Intubated    Comparison: Radiograph 7/7/2017    Findings: Stable positioning of endotracheal tube, enteric tube, and  right-sided internal jugular catheter. Cardiac silhouette is within  normal limits. Trachea is midline. Worsened layering pleural effusion  within the right hemithorax and moderate left pleural effusion with  associated left basilar opacities. No pneumothorax. Visualized abdomen  is normal.      Impression    Impression:   1. Worsened layering right pleural effusion with associated right  pulmonary opacities.  2.  Stable left pleural effusion with associated left basilar  atelectasis versus consolidation.    I have personally reviewed the examination and initial interpretation  and I agree with the findings.    ROB STARK MD       Cultures:   7/5  BAL; VRE   7/5 Ascites culture: GPC       Assessment/Plan:    Fluid/Electrolytes/Nutirtion:  -Protein calorie malnutrition  TPN  Bowel rest, NPO    -Hypervolemia  Electrolyte replacement protocol   TKO    Neuro:    -Hyperactive delirium   Start precedex   Wean fentanyl drip    Psychiatric:  -See neuro    Cardiac:  -No issues  Septic chock resolved       Pulmonary:    -Pulmonary edema  Aggressive diuresis with 40 lasix IV x 3 doses  TKO all fluids    -Pleural effusion  Chest CT per transplant   Diuresis   Holding off drainage due to thrombocytopenia       GI:    -Neutropenic colitis  Abx coverage with Ertapenem, should cover abdominal ascites GPC cultures    -Liver Transplant   Graft ultrasound normal     Renal:  -Hypervolemia  Lasix 40 x 2 doses this AM  Repeat 60 lasix once after 1g of IV chlorothiazide    -EJ   Resolving   Nephrology following    Hematologic:  -Pancytopenia   Suspect related to mycophenolate, stopped on admission   All indices improved, stop Neupogen  Hematology following    CBC's with diff     Endocrine:   -Hx of hyperkalemia   On PTA dose of Florinef    -T2DM  Insulin ggt    Immunologic:  -s/p Liver transplant   On tacrolimus, dailt levels   Stopped mycophenolate on admission     ID:  -Typhlitis  Ertapenem, Holding all other antimicrobials   Transplant ID following cultures   Holding Valcyte and bactrim in the setting of pancytopenia     -VRE BAL culture  Likely colonization since his pulmonary function is improving consistently   No need to treat    MKS:  -PT/OT consults     Prophylaxis:  -Holding DVT chemoprophylaxis per transplant   -IV protonix     Lines:  -Left axillary arterial line 7/5  -Right IJ CVC 7/4    Disposition:   -SICU     Seen with  Dr. Trini Santoyo MD   Surgical ICU Fellow - #[id number]

## 2017-07-08 NOTE — PROGRESS NOTES
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     Graft Function: Good function. US liver normal doppler. LFTs WNL. Would repeat levels every 48 hours for graft monitoring.   Immunosuppression Management:   CellCept 250 mg BID. Held since 6/27/17 due to neutropenia. Continue to hold.   Tac goal level 5-8. 7/7 level 6.8 (9 hour trough). Continue Prograf 4 mg AM, 3 mg PM dosing. Repeat tac level tomorrow for 12 hour trough.   Cardiorespiratory: Intubated for ex lap, has since required pressor support, norepinephrine titrated off yesterday. Moderate right sided pleural effusion. No intervention, monitor.  Vent management per SICU team, have tried one trial of pressure support which was unsuccessful possibly due to oversedation, will wean Fentanyl today. Chest Xray looks worsening right pleural effusion and left basal atelectasis. Would like to do CT chest to assess for possible intervention.  BNP 16,000. Possible due to volume overload. Consider ECHO given hypotension.   Hematology: Pancytopenia, acute on chronic. MMF and bactrim held (since 6/27). Valcyte held on admission. Appreciate hematology recs,   Neupogen 480 mcg given on 7/4 to 7/7,  ANC t >10 00 in two consecutive days . Will stop Neupogen.  CMV seronegative and donor seropositive. 7/3 CMV PCR < 137. Repeat CMV PCR in a few days.   GI: CT from [place] demonstrating right sided colonic inflammation, AXR on admission with dilated loop of bowel in central abdomen, felt likely to sigmoid. Initial impression consistent with Typhlitis however given worsening abdominal pain on  examination, hypotension and increasing lactate levels, elected to proceed with exploratory laparotomy. Findings on reexploration demonstrating persistent inflammation of the right colon without evidence of ischemia. Lactic acid now 1.2. Continue NPO and TPN. Continue antibiotics.   Fluid/Electrolytes/Renal: MIVF: per SICU. Recommend to decrease total fluids due to hypervolemia. Albumin replacements stopped. EJ, secondary to hypoperfusion, sepsis. Cr stable today. UO increasing. Nephrology consulting. Hyperkalemia, PTA on florinef. K today 4.0.   Endocrine: DM II, on insulin gtt.   Infectious disease: Tmax 100.4, WBC 0.8, ANC 0.3 on admission. Started on Zosyn, Vancomycin, Flagyl, Micafungin on admission (7/4/2017). Flagyl stopped. CMV PCR <137 now detectable in serum, plan to repeat CMV 7/10. Holding Valcyte 450 mg given neutropenia. Continue to hold bactrim. Blood cultures obtained 7/4, no growth thus far. Abdominal fluid culture collected intraoperatively, gram stain with no organisms, fungal and bacterial culture, no growth thus far. Bronchoalveolar lavage growing VRE. Tx ID consulted recommending discontinuing Ertapenem and restarting Zosyn for improved abdominal coverage. Heme consulted, recommend d/c zosyn. Discussed with pharmacy recommending Synercid for VRE coverage due to aspiration episode, avoid linezolid given pancytopenia.  Will obtain C dff if diarrhea.  Access: PIV x2, Central Line R IJ, R radial arterial line  Prophylaxis: Ranitidine IV, DVT-SCD  Disposition: SICU    Medical Decision Making: Medium  Admit [id number] (moderate level decision making)    PILLO/Fellow/Resident Provider: Luiza Wing PA-C    Faculty: Tip Zhang M.D.    __________________________________________________________________  Transplant History:.  3/4/2017 DD OLT with Dr. Tran (Liver), Postoperative day: 126     Interval History: History is obtained from EMR and nursing staff.  Overnight events: Trial of ventilation  weaning this morning not successful per SICU team. Has had urine output overnight, no longer requiring pressors. More alert intermittently overnight. Discussed with ID fellow.     ROS:   A 10-point review of systems was negative except as noted above.    Meds:    furosemide  40 mg Intravenous TID     ertapenem (INVanz) IV  1 g Intravenous Q24H     lipids  250 mL Intravenous Q24H     filgrastim (NEUPOGEN/GRANIX) intravenous  5 mcg/kg Intravenous Daily at 8 pm     pantoprazole  40 mg Intravenous Q24H     fludrocortisone  0.1 mg Oral Daily     sodium chloride (PF)  3 mL Intracatheter Q8H     tacrolimus  3 mg Oral QPM     tacrolimus  4 mg Oral QAM       Physical Exam:     Admit Weight: 94.2 kg (207 lb 11.2 oz) (SCALE 2)    Current vitals:   BP (!) 203/87  Pulse 101  Temp 100.2  F (37.9  C) (Axillary)  Resp 22  Ht 1.829 m (6')  Wt 100.2 kg (220 lb 14.4 oz)  SpO2 98%  BMI 29.96 kg/m2         Vital sign ranges:    Temp:  [99  F (37.2  C)-100.2  F (37.9  C)] 100.2  F (37.9  C)  Heart Rate:  [] 83  Resp:  [22-27] 22  MAP:  [69 mmHg-124 mmHg] 92 mmHg  Arterial Line BP: (105-186)/(48-86) 136/66  FiO2 (%):  [30 %-50 %] 30 %  SpO2:  [88 %-100 %] 98 %  Patient Vitals for the past 24 hrs:   Temp Temp src Heart Rate Resp SpO2   07/08/17 0800 100.2  F (37.9  C) Axillary 83 - 98 %   07/08/17 0730 - - - - 97 %   07/08/17 0500 - - 84 - 98 %   07/08/17 0400 99.8  F (37.7  C) Axillary 80 22 99 %   07/08/17 0300 - - 88 - 99 %   07/08/17 0200 100  F (37.8  C) Axillary 90 - 99 %   07/08/17 0100 - - 80 - 98 %   07/08/17 0000 99  F (37.2  C) Axillary 86 22 99 %   07/07/17 2300 - - 82 - 100 %   07/07/17 2200 - - 96 - 98 %   07/07/17 2110 - - 99 - 92 %   07/07/17 2105 - - 120 - (!) 88 %   07/07/17 2100 99.3  F (37.4  C) Axillary 118 24 (!) 88 %   07/07/17 2000 - - 102 - 98 %   07/07/17 1900 - - 86 22 98 %   07/07/17 1800 - - 87 22 97 %   07/07/17 1740 - - 108 27 97 %   07/07/17 1710 - - - - 100 %   07/07/17 1700 - - 108 23 97 %    07/07/17 1617 - - 87 - 97 %   07/07/17 1600 99.8  F (37.7  C) Axillary 93 22 99 %   07/07/17 1500 - - 98 - 98 %   07/07/17 1400 - - 87 22 98 %   07/07/17 1300 - - 95 - 99 %   07/07/17 1200 100  F (37.8  C) Axillary 93 22 98 %   07/07/17 1100 - - 97 - 98 %   07/07/17 1000 - - 96 22 98 %     General Appearance: sedated   Skin: normal, warm, dry  Heart: ST, normal S1 and S2. B/l feet warm, b/l toes cool, dusky  Lungs: Vented  Abdomen: The abdomen is soft, midline incision is c/d/i and covered. Incision from prior Liver transplant is noted and well healed.   : vazquez is present, yellow urine in bag.   Extremities:BLE trace edema.  Neurologic: sedated.     Data:   CMP    Recent Labs  Lab 07/08/17  0330 07/07/17  2309  07/07/17  0309  07/06/17  0741 07/06/17  0316  07/05/17  0859   * 145*  < > 144  < >  --  143  < > 137   POTASSIUM 3.7 3.6  < > 4.0  < > 4.9 5.0  < > 6.0*   CHLORIDE 117* 116*  < > 117*  < >  --  116*  < >  --    CO2 19* 20  < > 20  < >  --  18*  < >  --    * 129*  < > 142*  < >  --  139*  < > 316*   BUN 90* 90*  < > 76*  < >  --  62*  < >  --    CR 2.56* 2.61*  < > 2.71*  < >  --  2.75*  < >  --    GFRESTIMATED 26* 26*  < > 25*  < >  --  24*  < >  --    GFRESTBLACK 32* 31*  < > 30*  < >  --  29*  < >  --    JACOB 8.4* 8.0*  < > 7.6*  < >  --  6.9*  < >  --    ICAW  --   --   --   --   --  4.2*  --   --  4.4   MAG  --   --   --  2.2  --   --  2.1  < >  --    PHOS  --   --   --  3.7  --   --  5.5*  < >  --    ALBUMIN 2.0*  --   --  2.2*  --   --   --   < >  --    BILITOTAL 0.7  --   --  0.6  --   --   --   < >  --    ALKPHOS 48  --   --  35*  --   --   --   < >  --    AST 29  --   --  25  --   --   --   < >  --    ALT 15  --   --  16  --   --   --   < >  --    < > = values in this interval not displayed.  CBC    Recent Labs  Lab 07/08/17  0330 07/07/17  2309  07/06/17  0316   HGB 7.9* 7.5*  < > 7.1*   WBC 4.0 3.0*  < > 0.8*   PLT 35* 34*  < > 27*   A1C  --   --   --  Canceled, Test credited  Below Assay RangeNOTIFIED LEONARD ONEILL AT 0538 ON 7/6/17 BY [facility]   < > = values in this interval not displayed.  COAGS    Recent Labs  Lab 07/08/17  0902 07/06/17  0316   INR 1.22* 1.80*   PTT 38* 47*      Urinalysis  Recent Labs   Lab Test  07/06/17   1441  06/14/17   1508  06/06/17   1605   04/11/16   1345   COLOR  Yellow   --   Yellow   < >  Yellow   APPEARANCE  Cloudy   --   Slightly Cloudy   < >  Clear   URINEGLC  Negative   --   50*   < >  30*   URINEBILI  Negative   --   Negative   < >  Negative   URINEKETONE  Negative   --   Negative   < >  Negative   SG  1.014   --   1.015   < >  1.016   UBLD  Moderate*   --   Negative   < >  Small*   URINEPH  5.0   --   5.0   < >  5.0   PROTEIN  30*   --   100*   < >  30*   NITRITE  Negative   --   Negative   < >  Negative   LEUKEST  Trace*   --   Negative   < >  Negative   RBCU  >182*   --   2   < >  1   WBCU  9*   --   1   < >  1   UTPG   --   1.55*   --    --   0.41*    < > = values in this interval not displayed.       Cultures:   Blood Cultures x2 7/4/2017 Pending     Abdominal Fluid Culture 7/4/2017: Pending     Abdominal Fluid Culture 7/5/17: Pending    Bronchoalveolar Culture 7/5/17: VRE.     CT scan:    7/4/2017 CONCLUSION:   1. Right colonic wall thickening suggesting colitis. Follow-up is necessary to confirm resolution in order to exclude colonic mass.  2. Transverse colon is not well distended reducing evaluation for wall thickening.  3. Small amount of ascites.  4. Splenomegaly.  5. Prominent portal and splenic veins. If confirmation of vascular patency is indicated consider follow-up ultrasound of the liver with Doppler.  6. Small right pleural effusion.  7. Stranding in the subcutaneous fat of the ventral pelvis.     Abdominal XR 7/4/2017:   1. No evidence of pneumoperitoneum.  2. Dilated loop of bowel in the central abdomen, likely sigmoid.    XR Chest Portable 1 View 7/4/17:  1. Endotracheal tube tip projects 5.3 cm from the chacorta.  2. Increased  bilateral pleural effusions, right greater than left.  3. Unchanged bibasilar patchy opacities.

## 2017-07-08 NOTE — PROGRESS NOTES
Nephrology Progress Note  07/07/2017         Assessment & Recommendations:   Camacho Bhagat is a 51 year old man with h/o cirrhosis s/p OLT 3/2017 who is admitted with neutropenia and colitis, s/p exlap, no bowel removed but did have large insensible losses and shock requiring pressor support  1) hyperkalemia  - due to reduced GFR along with decreased distal sodium delivery, tacrolimus, acidosis.  improved overnight with medical management.  2) acute kidney injury - baseline creatinine is 1.5 - 1.8 mg/dL since OLT 3/2017.  He is non-oliguric and creatinine is stable at 2.6.    3) nongap metabolic acidosis with respiratory acidosis - stable, no strong indication for IV bicarbonate solution.  Will consider oral/enteric bicarbonate in future when gut is more functional  4) anemia due to illness, IS and blood loss etc - hematology consult yesterday, hemoglobin of 7.4 with associated pancytopenia and thrombocytopenia likely related to infection and meds etc.  Monitor and transfuse, neupogen per primary team and hematology  5) hypotension - resolved, does not require IV crystalloid outside of TPN.  Can start with gentle diuresis.  6) hypervolemia - with pl effusion on CXR.  He has recovered from shock.  Will start diuresis with furosemide 40 mg IV and assess response.  I'd like to see him 500-1000 ml net negative per day.      Discussed with primary team, SICU  Ninfa Hernández MD  [facility]  Department of Medicine  Division of Renal Disease and Hypertension  [phone]     Interval History :   In the last 24 hours Camacho Bhagat received less IV crystalloid, hemodynamics better and remains non-oliguric, now off pressor.  Discussed with wife at bedside, she knew he had kidney dysfunction prior to liver transplant but was unaware of any other kidney disease, unaware that kidney function had not returned to baseline.  She does not note any peripheral edema but states abdomen is more distended  than usual but not as distended as when he required q 4 day paracentesis.  He remains intubated and sedated.  Review of Systems:   Cannot obtain due to intubated sedated status    Physical Exam:   I/O last 3 completed shifts:  In: 2986.25 [I.V.:858.28]  Out: 1940 [Urine:1540; Emesis/NG output:400]   BP (!) 203/87  Pulse 101  Temp 99.8  F (37.7  C) (Axillary)  Resp 22  Ht 1.829 m (6')  Wt 100.2 kg (220 lb 14.4 oz)  SpO2 98%  BMI 29.96 kg/m2     GENERAL APPEARANCE: intubated, sedated  EYES:  no scleral icterus, pupils equal  HENT: ET tube  PULM: lungs clear to auscultation bilaterally, equal air movement, no clubbing  CV: regular rhythm, normal rate, no rub     -JVP not elevated     -edema trace   Neuro - sedated  Lines right IJ    Labs:   All labs reviewed by me  Electrolytes/Renal -   Recent Labs   Lab Test  07/07/17 1146 07/07/17 0309 07/06/17 1949 07/06/17   0316  07/06/17   0011   NA  144  144  143   < >  143  141   POTASSIUM  3.9  4.0  4.3   < >  5.0  5.2   CHLORIDE  116*  117*  116*   < >  116*  113*   CO2  19*  20  18*   < >  18*  18*   BUN  83*  76*  75*   < >  62*  65*   CR  2.61*  2.71*  2.79*   < >  2.75*  2.78*   GLC  135*  142*  140*   < >  139*  115*   JACOB  8.0*  7.6*  7.6*   < >  6.9*  6.7*   MAG   --   2.2   --    --   2.1  1.9   PHOS   --   3.7   --    --   5.5*  6.0*    < > = values in this interval not displayed.       CBC -   Recent Labs   Lab Test  07/07/17 1146 07/07/17 0309 07/06/17   2334   WBC  1.7*  1.2*  1.2*   HGB  7.4*  7.3*  7.5*   PLT  32*  27*  28*       LFTs -   Recent Labs   Lab Test  07/07/17 0309 07/05/17   2026  07/05/17   1043   ALKPHOS  35*  29*  37*   BILITOTAL  0.6  0.7  0.6   ALT  16  16  19   AST  25  24  30   PROTTOTAL  4.7*  4.5*  4.5*   ALBUMIN  2.2*  2.4*  2.4*       Iron Panel -   Recent Labs   Lab Test  02/08/16   1144   IRON  93   IRONSAT  41         Imaging:  All imaging studies reviewed by me.     Current Medications:    ertapenem (INVanz)  IV  1 g Intravenous Q24H     lipids  250 mL Intravenous Q24H     filgrastim (NEUPOGEN/GRANIX) intravenous  5 mcg/kg Intravenous Daily at 8 pm     pantoprazole  40 mg Intravenous Q24H     fludrocortisone  0.1 mg Oral Daily     sodium chloride (PF)  3 mL Intracatheter Q8H     tacrolimus  3 mg Oral QPM     tacrolimus  4 mg Oral QAM       parenteral nutrition - ADULT compounded formula 65 mL/hr at 07/07/17 1941     propofol (DIPRIVAN) infusion Stopped (07/06/17 1050)     NaCl 10 mL/hr at 07/07/17 1600     IV fluid REPLACEMENT ONLY       insulin (regular) 1 Units/hr (07/07/17 1800)     fentaNYL 150 mcg/hr (07/07/17 1802)     norepinephrine Stopped (07/06/17 1800)     IV fluid REPLACEMENT ONLY       Ninfa Hernández MD

## 2017-07-08 NOTE — PROGRESS NOTES
Hem-Onc Progress note      Assessment:  51 year old male with multiple co morbidities and Hx of liver transplant (3/2017) for ESLD and HCC (s/p  TACE x2) due to CASTAÑEDA (March2017) on chronic immunosuppressive therapy who is admitted to hospital for concern for neutropenic colitis and septic shock. He  is now s/p POD2 s/p Exlap and washout with no evidence of ischemic bowel. The hospital course has been complicated by development of severe neutropenia (ANC <500), thrombocytopenia and anemia. Peripheral smear, Total bili here has been unremarkable, and LDH is only mildly elevated. Absolute Retic count is decreased suggesting marrow suppression.  Of note, pt has had thrmobocytopenia for a long time since his ESLD in 2016. Certainly since 6/2017 his counts of all cell lines have been progressively declining while he was on Tacrolimus, mycophenolate, Bactrim and Valgancyclovir. It is not clear if the dosing of these drugs was changed around that time. His current presentation of pancytopenia is multi-factorial from a combination of immunosuppression (mycophenolate), medication (Bactrim, Valgancyclovir) and sepsis related BM suppression. Pt also has received short course of Zosyn which has also been associated with thrombocytopenia. Vancomycin has also been associated with thrombocytopenia with incidence of ~1%. Primary bone marrow failure syndromes like MDS, leukemia are arthur likely. Other D/D to consider are autoimmune causes (Hx of RA), nutritional causes like Vit B12 deficiecny, although less likely. Hopefully the counts should improve with time.      Plan:     -Agree with stopping Mycophenolate, TMP-SMX, Zosyn, Valgancyclovir for now  -Continue Filgrastim until ANC>1000 for 2 consecutive days  -Continue Broad spectrum ABx as per primary team/transplant ID  -Gaol Hb >7 gm, transfuse as necessary  -Platelet Transfusion not necessary unless Plt count <51426; or 50,000 with active bleeding  -Monitor signs for bleeding  -No  indication for BM biopsy at this time        Pt  discussed with Dr. Griffin.      Catrachito Drake MD  Hematology Oncology fellow  [phone]

## 2017-07-08 NOTE — PLAN OF CARE
Problem: Restraint for Non-Violent/Non-Self-Destructive Behavior  Goal: Prevent/Manage Potential Problems  Maintain safety of patient and others during period of restraint.  Promote psychological and physical wellbeing.  Prevent injury to skin and involved body parts.  Promote nutrition, hydration, and elimination.   Outcome: No Change  Patient not meeting criteria for discontinuation, restraints kept in place.

## 2017-07-09 ENCOUNTER — APPOINTMENT (OUTPATIENT)
Dept: GENERAL RADIOLOGY | Facility: CLINIC | Age: 52
End: 2017-07-09
Attending: TRANSPLANT SURGERY
Payer: COMMERCIAL

## 2017-07-09 ENCOUNTER — APPOINTMENT (OUTPATIENT)
Dept: GENERAL RADIOLOGY | Facility: CLINIC | Age: 52
End: 2017-07-09
Attending: SURGERY
Payer: COMMERCIAL

## 2017-07-09 LAB
ANION GAP SERPL CALCULATED.3IONS-SCNC: 9 MMOL/L (ref 3–14)
ANION GAP SERPL CALCULATED.3IONS-SCNC: 9 MMOL/L (ref 3–14)
ANISOCYTOSIS BLD QL SMEAR: SLIGHT
ANISOCYTOSIS BLD QL SMEAR: SLIGHT
BACTERIA SPEC CULT: NO GROWTH
BASOPHILS # BLD AUTO: 0 10E9/L (ref 0–0.2)
BASOPHILS # BLD AUTO: 0.1 10E9/L (ref 0–0.2)
BASOPHILS NFR BLD AUTO: 0 %
BASOPHILS NFR BLD AUTO: 0.9 %
BUN SERPL-MCNC: 114 MG/DL (ref 7–30)
BUN SERPL-MCNC: 127 MG/DL (ref 7–30)
CALCIUM SERPL-MCNC: 9 MG/DL (ref 8.5–10.1)
CALCIUM SERPL-MCNC: 9 MG/DL (ref 8.5–10.1)
CHLORIDE SERPL-SCNC: 117 MMOL/L (ref 94–109)
CHLORIDE SERPL-SCNC: 118 MMOL/L (ref 94–109)
CO2 SERPL-SCNC: 20 MMOL/L (ref 20–32)
CO2 SERPL-SCNC: 21 MMOL/L (ref 20–32)
CREAT SERPL-MCNC: 2.51 MG/DL (ref 0.66–1.25)
CREAT SERPL-MCNC: 2.62 MG/DL (ref 0.66–1.25)
DIFFERENTIAL METHOD BLD: ABNORMAL
DIFFERENTIAL METHOD BLD: ABNORMAL
EOSINOPHIL # BLD AUTO: 0.1 10E9/L (ref 0–0.7)
EOSINOPHIL # BLD AUTO: 0.2 10E9/L (ref 0–0.7)
EOSINOPHIL NFR BLD AUTO: 1.8 %
EOSINOPHIL NFR BLD AUTO: 1.8 %
ERYTHROCYTE [DISTWIDTH] IN BLOOD BY AUTOMATED COUNT: 15.3 % (ref 10–15)
ERYTHROCYTE [DISTWIDTH] IN BLOOD BY AUTOMATED COUNT: 15.4 % (ref 10–15)
GFR SERPL CREATININE-BSD FRML MDRD: 26 ML/MIN/1.7M2
GFR SERPL CREATININE-BSD FRML MDRD: 27 ML/MIN/1.7M2
GLUCOSE BLDC GLUCOMTR-MCNC: 132 MG/DL (ref 70–99)
GLUCOSE BLDC GLUCOMTR-MCNC: 135 MG/DL (ref 70–99)
GLUCOSE BLDC GLUCOMTR-MCNC: 135 MG/DL (ref 70–99)
GLUCOSE BLDC GLUCOMTR-MCNC: 140 MG/DL (ref 70–99)
GLUCOSE BLDC GLUCOMTR-MCNC: 141 MG/DL (ref 70–99)
GLUCOSE BLDC GLUCOMTR-MCNC: 144 MG/DL (ref 70–99)
GLUCOSE BLDC GLUCOMTR-MCNC: 147 MG/DL (ref 70–99)
GLUCOSE BLDC GLUCOMTR-MCNC: 147 MG/DL (ref 70–99)
GLUCOSE BLDC GLUCOMTR-MCNC: 153 MG/DL (ref 70–99)
GLUCOSE BLDC GLUCOMTR-MCNC: 153 MG/DL (ref 70–99)
GLUCOSE BLDC GLUCOMTR-MCNC: 154 MG/DL (ref 70–99)
GLUCOSE BLDC GLUCOMTR-MCNC: 155 MG/DL (ref 70–99)
GLUCOSE BLDC GLUCOMTR-MCNC: 158 MG/DL (ref 70–99)
GLUCOSE BLDC GLUCOMTR-MCNC: 160 MG/DL (ref 70–99)
GLUCOSE BLDC GLUCOMTR-MCNC: 162 MG/DL (ref 70–99)
GLUCOSE BLDC GLUCOMTR-MCNC: 170 MG/DL (ref 70–99)
GLUCOSE SERPL-MCNC: 148 MG/DL (ref 70–99)
GLUCOSE SERPL-MCNC: 170 MG/DL (ref 70–99)
HCT VFR BLD AUTO: 24.2 % (ref 40–53)
HCT VFR BLD AUTO: 25.3 % (ref 40–53)
HGB BLD-MCNC: 7.8 G/DL (ref 13.3–17.7)
HGB BLD-MCNC: 8.2 G/DL (ref 13.3–17.7)
LACTATE BLD-SCNC: 0.9 MMOL/L (ref 0.7–2.1)
LYMPHOCYTES # BLD AUTO: 0.1 10E9/L (ref 0.8–5.3)
LYMPHOCYTES # BLD AUTO: 0.5 10E9/L (ref 0.8–5.3)
LYMPHOCYTES NFR BLD AUTO: 1.8 %
LYMPHOCYTES NFR BLD AUTO: 5.3 %
MAGNESIUM SERPL-MCNC: 2.4 MG/DL (ref 1.6–2.3)
MAGNESIUM SERPL-MCNC: 2.5 MG/DL (ref 1.6–2.3)
MCH RBC QN AUTO: 28.1 PG (ref 26.5–33)
MCH RBC QN AUTO: 28.5 PG (ref 26.5–33)
MCHC RBC AUTO-ENTMCNC: 32.2 G/DL (ref 31.5–36.5)
MCHC RBC AUTO-ENTMCNC: 32.4 G/DL (ref 31.5–36.5)
MCV RBC AUTO: 87 FL (ref 78–100)
MCV RBC AUTO: 88 FL (ref 78–100)
METAMYELOCYTES # BLD: 0.1 10E9/L
METAMYELOCYTES # BLD: 0.3 10E9/L
METAMYELOCYTES NFR BLD MANUAL: 1.8 %
METAMYELOCYTES NFR BLD MANUAL: 2.7 %
MICRO REPORT STATUS: NORMAL
MONOCYTES # BLD AUTO: 0.2 10E9/L (ref 0–1.3)
MONOCYTES # BLD AUTO: 0.4 10E9/L (ref 0–1.3)
MONOCYTES NFR BLD AUTO: 1.8 %
MONOCYTES NFR BLD AUTO: 5.3 %
MYELOCYTES # BLD: 0.1 10E9/L
MYELOCYTES NFR BLD MANUAL: 0.9 %
NEUTROPHILS # BLD AUTO: 6.3 10E9/L (ref 1.6–8.3)
NEUTROPHILS # BLD AUTO: 8.5 10E9/L (ref 1.6–8.3)
NEUTROPHILS NFR BLD AUTO: 85.7 %
NEUTROPHILS NFR BLD AUTO: 89.3 %
NRBC # BLD AUTO: 0.1 10*3/UL
NRBC BLD AUTO-RTO: 1 /100
PHOSPHATE SERPL-MCNC: 2.2 MG/DL (ref 2.5–4.5)
PHOSPHATE SERPL-MCNC: 3.1 MG/DL (ref 2.5–4.5)
PLATELET # BLD AUTO: 56 10E9/L (ref 150–450)
PLATELET # BLD AUTO: 60 10E9/L (ref 150–450)
PLATELET # BLD EST: ABNORMAL 10*3/UL
PLATELET # BLD EST: ABNORMAL 10*3/UL
POTASSIUM SERPL-SCNC: 3.3 MMOL/L (ref 3.4–5.3)
POTASSIUM SERPL-SCNC: 3.3 MMOL/L (ref 3.4–5.3)
POTASSIUM SERPL-SCNC: 3.6 MMOL/L (ref 3.4–5.3)
POTASSIUM SERPL-SCNC: 3.6 MMOL/L (ref 3.4–5.3)
PROMYELOCYTES # BLD MANUAL: 0.1 10E9/L
PROMYELOCYTES NFR BLD MANUAL: 0.9 %
RBC # BLD AUTO: 2.78 10E12/L (ref 4.4–5.9)
RBC # BLD AUTO: 2.88 10E12/L (ref 4.4–5.9)
SODIUM SERPL-SCNC: 147 MMOL/L (ref 133–144)
SODIUM SERPL-SCNC: 147 MMOL/L (ref 133–144)
SPECIMEN SOURCE: NORMAL
TACROLIMUS BLD-MCNC: 5 UG/L (ref 5–15)
TME LAST DOSE: NORMAL H
WBC # BLD AUTO: 7 10E9/L (ref 4–11)
WBC # BLD AUTO: 9.9 10E9/L (ref 4–11)

## 2017-07-09 PROCEDURE — 99291 CRITICAL CARE FIRST HOUR: CPT | Mod: 25 | Performed by: SURGERY

## 2017-07-09 PROCEDURE — 40000275 ZZH STATISTIC RCP TIME EA 10 MIN

## 2017-07-09 PROCEDURE — 25000125 ZZHC RX 250: Performed by: PHYSICIAN ASSISTANT

## 2017-07-09 PROCEDURE — 83735 ASSAY OF MAGNESIUM: CPT | Performed by: SURGERY

## 2017-07-09 PROCEDURE — 20000004 ZZH R&B ICU UMMC

## 2017-07-09 PROCEDURE — 85025 COMPLETE CBC W/AUTO DIFF WBC: CPT | Performed by: SURGERY

## 2017-07-09 PROCEDURE — 71010 XR CHEST PORT 1 VW: CPT

## 2017-07-09 PROCEDURE — 25000125 ZZHC RX 250: Performed by: TRANSPLANT SURGERY

## 2017-07-09 PROCEDURE — 93010 ELECTROCARDIOGRAM REPORT: CPT | Performed by: INTERNAL MEDICINE

## 2017-07-09 PROCEDURE — 25000125 ZZHC RX 250: Performed by: SURGERY

## 2017-07-09 PROCEDURE — 84132 ASSAY OF SERUM POTASSIUM: CPT | Performed by: STUDENT IN AN ORGANIZED HEALTH CARE EDUCATION/TRAINING PROGRAM

## 2017-07-09 PROCEDURE — 25000128 H RX IP 250 OP 636: Performed by: STUDENT IN AN ORGANIZED HEALTH CARE EDUCATION/TRAINING PROGRAM

## 2017-07-09 PROCEDURE — 84132 ASSAY OF SERUM POTASSIUM: CPT | Performed by: INTERNAL MEDICINE

## 2017-07-09 PROCEDURE — 27210995 ZZH RX 272: Performed by: SURGERY

## 2017-07-09 PROCEDURE — 40000940 XR CHEST PORT 1 VW

## 2017-07-09 PROCEDURE — 25000125 ZZHC RX 250: Performed by: STUDENT IN AN ORGANIZED HEALTH CARE EDUCATION/TRAINING PROGRAM

## 2017-07-09 PROCEDURE — 83605 ASSAY OF LACTIC ACID: CPT | Performed by: STUDENT IN AN ORGANIZED HEALTH CARE EDUCATION/TRAINING PROGRAM

## 2017-07-09 PROCEDURE — 25000132 ZZH RX MED GY IP 250 OP 250 PS 637: Performed by: STUDENT IN AN ORGANIZED HEALTH CARE EDUCATION/TRAINING PROGRAM

## 2017-07-09 PROCEDURE — 25000132 ZZH RX MED GY IP 250 OP 250 PS 637: Performed by: NURSE PRACTITIONER

## 2017-07-09 PROCEDURE — 25000128 H RX IP 250 OP 636: Performed by: PHYSICIAN ASSISTANT

## 2017-07-09 PROCEDURE — 25000131 ZZH RX MED GY IP 250 OP 636 PS 637: Performed by: SURGERY

## 2017-07-09 PROCEDURE — 25000131 ZZH RX MED GY IP 250 OP 636 PS 637: Performed by: TRANSPLANT SURGERY

## 2017-07-09 PROCEDURE — 80197 ASSAY OF TACROLIMUS: CPT | Performed by: PHYSICIAN ASSISTANT

## 2017-07-09 PROCEDURE — 84100 ASSAY OF PHOSPHORUS: CPT | Performed by: SURGERY

## 2017-07-09 PROCEDURE — 40000014 ZZH STATISTIC ARTERIAL MONITORING DAILY

## 2017-07-09 PROCEDURE — 25000128 H RX IP 250 OP 636: Performed by: SURGERY

## 2017-07-09 PROCEDURE — 80048 BASIC METABOLIC PNL TOTAL CA: CPT | Performed by: SURGERY

## 2017-07-09 PROCEDURE — 00000146 ZZHCL STATISTIC GLUCOSE BY METER IP

## 2017-07-09 PROCEDURE — 25000132 ZZH RX MED GY IP 250 OP 250 PS 637

## 2017-07-09 PROCEDURE — 93005 ELECTROCARDIOGRAM TRACING: CPT

## 2017-07-09 PROCEDURE — 94003 VENT MGMT INPAT SUBQ DAY: CPT

## 2017-07-09 PROCEDURE — 25000132 ZZH RX MED GY IP 250 OP 250 PS 637: Performed by: SURGERY

## 2017-07-09 PROCEDURE — 40000196 ZZH STATISTIC RAPCV CVP MONITORING

## 2017-07-09 PROCEDURE — 32551 INSERTION OF CHEST TUBE: CPT | Mod: GC | Performed by: SURGERY

## 2017-07-09 PROCEDURE — 0W9930Z DRAINAGE OF RIGHT PLEURAL CAVITY WITH DRAINAGE DEVICE, PERCUTANEOUS APPROACH: ICD-10-PCS | Performed by: SURGERY

## 2017-07-09 RX ORDER — OXYCODONE HCL 5 MG/5 ML
5 SOLUTION, ORAL ORAL ONCE
Status: COMPLETED | OUTPATIENT
Start: 2017-07-09 | End: 2017-07-09

## 2017-07-09 RX ORDER — FUROSEMIDE 10 MG/ML
60 INJECTION INTRAMUSCULAR; INTRAVENOUS 3 TIMES DAILY
Status: COMPLETED | OUTPATIENT
Start: 2017-07-09 | End: 2017-07-09

## 2017-07-09 RX ORDER — SODIUM BICARBONATE 650 MG/1
650 TABLET ORAL 3 TIMES DAILY
Status: CANCELLED | OUTPATIENT
Start: 2017-07-09

## 2017-07-09 RX ORDER — OXYCODONE HYDROCHLORIDE 5 MG/1
5-10 TABLET ORAL EVERY 4 HOURS PRN
Status: DISCONTINUED | OUTPATIENT
Start: 2017-07-09 | End: 2017-07-10

## 2017-07-09 RX ORDER — QUETIAPINE FUMARATE 50 MG/1
50 TABLET, FILM COATED ORAL EVERY 8 HOURS
Status: DISCONTINUED | OUTPATIENT
Start: 2017-07-09 | End: 2017-07-10

## 2017-07-09 RX ADMIN — POTASSIUM CHLORIDE 20 MEQ: 29.8 INJECTION, SOLUTION INTRAVENOUS at 06:22

## 2017-07-09 RX ADMIN — FUROSEMIDE 60 MG: 10 INJECTION, SOLUTION INTRAVENOUS at 14:06

## 2017-07-09 RX ADMIN — TACROLIMUS 4 MG: 5 CAPSULE ORAL at 07:54

## 2017-07-09 RX ADMIN — POTASSIUM & SODIUM PHOSPHATES POWDER PACK 280-160-250 MG 1 PACKET: 280-160-250 PACK at 12:03

## 2017-07-09 RX ADMIN — POTASSIUM CHLORIDE 20 MEQ: 1.5 POWDER, FOR SOLUTION ORAL at 16:12

## 2017-07-09 RX ADMIN — TACROLIMUS 3 MG: 5 CAPSULE ORAL at 18:15

## 2017-07-09 RX ADMIN — OXYCODONE HYDROCHLORIDE 10 MG: 5 TABLET ORAL at 23:09

## 2017-07-09 RX ADMIN — HUMAN INSULIN 3 UNITS/HR: 100 INJECTION, SOLUTION SUBCUTANEOUS at 20:31

## 2017-07-09 RX ADMIN — POTASSIUM & SODIUM PHOSPHATES POWDER PACK 280-160-250 MG 1 PACKET: 280-160-250 PACK at 16:12

## 2017-07-09 RX ADMIN — FUROSEMIDE 60 MG: 10 INJECTION, SOLUTION INTRAVENOUS at 20:30

## 2017-07-09 RX ADMIN — OXYCODONE HYDROCHLORIDE 5 MG: 5 SOLUTION ORAL at 13:19

## 2017-07-09 RX ADMIN — POTASSIUM CHLORIDE 20 MEQ: 29.8 INJECTION, SOLUTION INTRAVENOUS at 10:25

## 2017-07-09 RX ADMIN — OXYCODONE HYDROCHLORIDE 5 MG: 5 TABLET ORAL at 18:59

## 2017-07-09 RX ADMIN — I.V. FAT EMULSION 250 ML: 20 EMULSION INTRAVENOUS at 20:30

## 2017-07-09 RX ADMIN — CHLOROTHIAZIDE SODIUM 1000 MG: 500 INJECTION, POWDER, LYOPHILIZED, FOR SOLUTION INTRAVENOUS at 08:41

## 2017-07-09 RX ADMIN — CALCIUM GLUCONATE: 94 INJECTION, SOLUTION INTRAVENOUS at 20:29

## 2017-07-09 RX ADMIN — DEXMEDETOMIDINE HYDROCHLORIDE 0.6 MCG/KG/HR: 4 INJECTION, SOLUTION INTRAVENOUS at 07:21

## 2017-07-09 RX ADMIN — ACETAMINOPHEN 650 MG: 325 TABLET, FILM COATED ORAL at 13:19

## 2017-07-09 RX ADMIN — ERTAPENEM SODIUM 1 G: 1 INJECTION, POWDER, LYOPHILIZED, FOR SOLUTION INTRAMUSCULAR; INTRAVENOUS at 09:42

## 2017-07-09 RX ADMIN — QUETIAPINE FUMARATE 50 MG: 50 TABLET, FILM COATED ORAL at 20:28

## 2017-07-09 RX ADMIN — BISACODYL 10 MG: 10 SUPPOSITORY RECTAL at 10:27

## 2017-07-09 RX ADMIN — SODIUM PHOSPHATE, MONOBASIC, MONOHYDRATE AND SODIUM PHOSPHATE, DIBASIC, ANHYDROUS 15 MMOL: 276; 142 INJECTION, SOLUTION INTRAVENOUS at 08:51

## 2017-07-09 RX ADMIN — FLUDROCORTISONE ACETATE 0.1 MG: 0.1 TABLET ORAL at 07:54

## 2017-07-09 RX ADMIN — HUMAN INSULIN 5 UNITS/HR: 100 INJECTION, SOLUTION SUBCUTANEOUS at 14:16

## 2017-07-09 RX ADMIN — DEXMEDETOMIDINE HYDROCHLORIDE 0.8 MCG/KG/HR: 4 INJECTION, SOLUTION INTRAVENOUS at 01:48

## 2017-07-09 RX ADMIN — POTASSIUM & SODIUM PHOSPHATES POWDER PACK 280-160-250 MG 1 PACKET: 280-160-250 PACK at 20:28

## 2017-07-09 RX ADMIN — PANTOPRAZOLE SODIUM 40 MG: 40 TABLET, DELAYED RELEASE ORAL at 13:19

## 2017-07-09 RX ADMIN — POTASSIUM CHLORIDE 20 MEQ: 29.8 INJECTION, SOLUTION INTRAVENOUS at 05:13

## 2017-07-09 RX ADMIN — HUMAN INSULIN 4 UNITS/HR: 100 INJECTION, SOLUTION SUBCUTANEOUS at 00:31

## 2017-07-09 RX ADMIN — QUETIAPINE FUMARATE 50 MG: 50 TABLET, FILM COATED ORAL at 12:03

## 2017-07-09 RX ADMIN — DEXMEDETOMIDINE HYDROCHLORIDE 0.2 MCG/KG/HR: 4 INJECTION, SOLUTION INTRAVENOUS at 18:15

## 2017-07-09 RX ADMIN — POTASSIUM CHLORIDE 40 MEQ: 1.5 POWDER, FOR SOLUTION ORAL at 14:16

## 2017-07-09 RX ADMIN — ACETAMINOPHEN 650 MG: 325 TABLET, FILM COATED ORAL at 18:59

## 2017-07-09 RX ADMIN — HUMAN INSULIN 3 UNITS/HR: 100 INJECTION, SOLUTION SUBCUTANEOUS at 07:49

## 2017-07-09 RX ADMIN — POTASSIUM CHLORIDE 20 MEQ: 1.5 POWDER, FOR SOLUTION ORAL at 18:15

## 2017-07-09 RX ADMIN — FUROSEMIDE 60 MG: 10 INJECTION, SOLUTION INTRAVENOUS at 07:54

## 2017-07-09 NOTE — PLAN OF CARE
Problem: Sepsis (Adult)  Goal: Signs and Symptoms of Listed Potential Problems Will be Absent or Manageable (Sepsis)  Signs and symptoms of listed potential problems will be absent or manageable by discharge/transition of care (reference Sepsis (Adult) CPG).   Outcome: No Change  Tmax 101.0 this AM. Fentanyl weaned off, precedex titrated to maintain vent compliance. Neuro status unchanged, patient withdraws to pain, opens eyes to noxious stimuli, unresponsive to all commands/instruction. HR w/ occasional PVC. BP stable , MAP>60. Lungs coarse, moderate and thick sputum w/ suctioning, failed pressure support stopped this AM d/t RR >40. Crowe in place, urine output fluctuating w/ diuretics. Small soft BM x1. ABD incision w/ staples c/d/i. NG to LIS.Insulin drip infusing.

## 2017-07-09 NOTE — PLAN OF CARE
Problem: Goal Outcome Summary  Goal: Goal Outcome Summary  Outcome: No Change  Neuros unchanged, moves extremities spontaneously, restless at times. Precedex 0.2mcg/kg/min, one time oxycodone given for restlessness and tachypnea with improvement, started on scheduled seroquel. Hemodynamically stable, NSR 60-80s. Lungs coarse/diminished, moderate secretions, vent settings unchanged. PS 2.5 hrs today and tolerated well. R chest tube placed and drained 950ml serosanguinous. Good urine output with diuresis. Electrolyte replacement as needed.

## 2017-07-09 NOTE — PROGRESS NOTES
SICU Progress Note  Primary Service: Liver Transplant  07/09/2017      Summary:  51 year old man s/p liver transplant March 2017 after [place] admitted on 7/4 for abdominal pain and fever found to have neutropenic colitis  7/4/2017 Exploratory laparotomy due to concern for perforation, viable hepatic flexure   7/5/2017 Abdominal washout and closure  7/5/2017  Left axillary arterial line, bronchoscopy and BAL   7/7/2017  Off pressors   7/9/2017  Right pleural pigtail catheter      Events/Changes:  - Continue Diuresis today  - Transition to Seroquel and oxycodone  - Continue trials of pressure support  - Right pleural pigtail drain  - PO protonix     Subjective:  Hypertensive episodes with weaning precedex and pressure trials   Off fentanyl overnight     On Exam:  /60  Pulse 101  Temp 101  F (38.3  C) (Axillary)  Resp 30  Ht 1.829 m (6')  Wt 99.9 kg (220 lb 3.8 oz)  SpO2 98%  BMI 29.87 kg/m2     Ventilation Mode: CMV/AC  FiO2 (%): 30 %  Rate Set (breaths/minute): 22 breaths/min  Tidal Volume Set (mL): 500 mL  PEEP (cm H2O): 5 cmH2O  Pressure Support (cm H2O): 10 cmH2O  Oxygen Concentration (%): 30 %  Resp: 30    I/O last 3 completed shifts:  In: 2967.64 [I.V.:1241.81; NG/GT:50]  Out: 4000 [Urine:3550; Emesis/NG output:450]    Drips:  Precedex     Constitutional: Intubated and sedate   Cardiac:  Sinus tachycardia, no murmurs noted   Respiratory:  Clear sounds   Abdomen:  Incision c/d/i, staples in place, no drainage   Genitourinary: Crowe in place, scrotal edema   Extremities: Pitting edema, right index fingertip remains dusky, toe tips are much improved  Neurologic: Sedated at time of exam       Metabolic Panel    Recent Labs  Lab 07/09/17  0321 07/08/17  1649 07/08/17  1238 07/08/17  0330 07/07/17  2309 07/07/17  1146 07/07/17  0309   * 146*  --  146* 145* 144 144   POTASSIUM 3.3* 3.4 3.5 3.7 3.6 3.9 4.0   CHLORIDE 118* 118*  --  117* 116* 116* 117*   CO2 20 19*  --  19* 20 19* 20   * 102*  --   90* 90* 83* 76*   CR 2.51* 2.48*  --  2.56* 2.61* 2.61* 2.71*   * 183*  --  137* 129* 135* 142*   JACOB 9.0 8.7  --  8.4* 8.0* 8.0* 7.6*   MAG 2.4* 2.4* 2.4* 2.2  --   --  2.2   PHOS 2.2* 2.3* 2.0* 2.2*  --   --  3.7   LACT 0.9  --   --  1.4  --  1.2 1.2       LFT    Recent Labs  Lab 07/08/17  0902 07/08/17  0330 07/07/17  0309 07/06/17 0316 07/06/17 0011 07/05/17 2053 07/05/17 2026 07/05/17  1043   AST  --  29 25  --   --   --  24 30   ALT  --  15 16  --   --   --  16 19   ALKPHOS  --  48 35*  --   --   --  29* 37*   BILITOTAL  --  0.7 0.6  --   --   --  0.7 0.6   ALBUMIN  --  2.0* 2.2*  --   --   --  2.4* 2.4*   INR 1.22*  --   --  1.80* 1.91* 1.92*  --  2.37*       Coags    Recent Labs  Lab 07/08/17 0902 07/06/17 0316 07/06/17 0011 07/05/17 2053   INR 1.22* 1.80* 1.91* 1.92*   PTT 38* 47* 48* 52*   FIBR 719* 538* 527* 470*       CBC    Recent Labs  Lab 07/09/17  0321 07/08/17  1649 07/08/17  0330 07/07/17  2309   WBC 7.0 6.3 4.0 3.0*   HGB 7.8* 8.0* 7.9* 7.5*   PLT 56* 46* 35* 34*       ABG    Recent Labs  Lab 07/08/17  0902 07/06/17  2334 07/06/17 0015 07/05/17 2026   PH 7.32* 7.26* 7.27* 7.27*   PCO2 35 37 35 35   PO2 96 85 109* 155*   HCO3 18* 17* 16* 16*   O2PER 30 30.0 30.0 40       VBG    Recent Labs  Lab 07/08/17  0902 07/06/17  2334 07/06/17  0015 07/05/17 2026   O2PER 30 30.0 30.0 40     Imaging:   Recent Results (from the past 24 hour(s))   CT Chest w/o Contrast    Narrative    EXAMINATION: Chest CT  7/8/2017     CLINICAL HISTORY: Pleural effusions, pulmonary infiltrates.h/o  cirrhosis s/p OLT 3/2017 who is admitted with neutropenia and colitis,  s/p exlap    COMPARISON: CT abdomen pelvis dated 7/4/2017, chest radiograph dated  7/8/2017.    TECHNIQUE: CT imaging obtained through the chest without intravenous  contrast. Coronal and axial MIP reformatted images obtained.    FINDINGS:  Endotracheal tube is present.    Heart size is enlarged. No significant pericardial effusion.  Prominent  pulmonary artery measures up to 4.1 cm. No thoracic lymphadenopathy.  Perihilar groundglass attenuation suspicious of pulmonary edema. Large  pleural effusion on right and small pleural effusion on left overlying  on the bilateral lower lobe atelectasis.     Bones and soft tissues: Degenerative changes of the thoracic and  lumbar spine. Wedge shaped deformity of T8. No suspicious bone  findings.     Partially imaged upper abdomen: Limited. Multiple surgical clips and  enteric tube present.      Impression    IMPRESSION: Large pleural effusion on right and small pleural effusion  on left overlying bilateral lower lobe atelectasis. Bibasilar  infection or aspiration cannot excluded.  1.  Cardiomegaly. Prominent pulmonary artery measures up to 4.1 cm.  2.  Perihilar groundglass attenuation suspicious of pulmonary edema.  Anasarca.    I have personally reviewed the examination and initial interpretation  and I agree with the findings.    BLANCA HUYNH MD   XR Chest Port 1 View    Narrative    Examination: XR CHEST PORT 1 VW, 7/9/2017 2:58 AM    Comparison: Chest radiograph and CT 7/8/2017    History: intubated    Findings: Endotracheal tube tip at the level of the mid thoracic  trachea. Right IJ central venous catheter tip at the level of the low  SVC. Enteric tube extends towards the left upper quadrant, tip not  included in the field-of-view. Cardiac silhouette is obscured. Large  right-sided pleural effusion, with hazy opacification of the right  hemithorax at least partially related to layering effusion. Left  basilar opacities are grossly unchanged. The left costophrenic angle  is not included in the field-of-view. No pneumothorax.      Impression    Impression: Large right-sided pleural effusion and bilateral opacities  are grossly similar accounting for differences in patient positioning.       Cultures:   7/5  BAL; VRE   7/5 Ascites culture: GPC        Assessment/Plan:    Fluid/Electrolytes/Nutirtion:  -Protein calorie malnutrition  TPN  Bowel rest, NPO    -Hypervolemia  Electrolyte replacement protocol   TKO    Neuro:    -Hyperactive delirium   Stop precedex fentanyl drip  Start seroquel 50 q8h and prn oxycodone     Psychiatric:  -See neuro    Cardiac:  -No issues  Septic chock resolved       Pulmonary:    -Pulmonary edema  Aggressive diuresis with 60 lasix IV x 3 doses + Diuril 1g once (to help with naturesis)  TKO all fluids    -Pleural effusion  Right pigtail today   Diuresis as above     GI:    -Neutropenic colitis  Abx coverage with Ertapenem, should cover abdominal ascites GPC     -Liver Transplant   Graft ultrasound normal     Renal:  -Hypervolemia  Diuresis as above     -EJ   Resolving   Nephrology following    Hematologic:  -Pancytopenia   Suspect related to mycophenolate, stopped on admission   All indices improved, Neupogen stopped 7/8   Hematology following    CBC's with diff     Endocrine:   -Hx of hyperkalemia   On PTA dose of Florinef    -T2DM  Insulin ggt    Immunologic:  -s/p Liver transplant   On tacrolimus, dailt levels   Stopped mycophenolate on admission     ID:  -Typhlitis  Ertapenem, Holding all other antimicrobials   Transplant ID following cultures   Holding Valcyte and bactrim in the setting of pancytopenia     -VRE BAL culture  Likely colonization since his pulmonary function is improving consistently   No need to treat    MKS:  -PT/OT consults     Prophylaxis:  -Holding DVT chemoprophylaxis per transplant   -IV protonix     Lines:  -Left axillary arterial line 7/5  -Right IJ CVC 7/4  -Right Pigtail 7/9    Disposition:   -SICU     Discussed with Dr. Alexei Santoyo MD   Surgical ICU Fellow - #[id number]

## 2017-07-09 NOTE — PLAN OF CARE
Problem: Restraint for Non-Violent/Non-Self-Destructive Behavior  Goal: Prevent/Manage Potential Problems  Maintain safety of patient and others during period of restraint.  Promote psychological and physical wellbeing.  Prevent injury to skin and involved body parts.  Promote nutrition, hydration, and elimination.   Outcome: No Change  Not meeting criteria for discontinuation of restraints.

## 2017-07-09 NOTE — PLAN OF CARE
Problem: Goal Outcome Summary  Goal: Goal Outcome Summary  4A - Pt intubated,sedated, not following commands at this time, Per discussion with RN no immediate ROM concerns today, pt has been spontaneously moving. Will HOLD PT Evaluation with plan for OT to initiate ROM as appropriate. Will assess as appropriate when pt demonstrating increased participation in therapy.

## 2017-07-10 ENCOUNTER — APPOINTMENT (OUTPATIENT)
Dept: GENERAL RADIOLOGY | Facility: CLINIC | Age: 52
End: 2017-07-10
Attending: TRANSPLANT SURGERY
Payer: COMMERCIAL

## 2017-07-10 ENCOUNTER — APPOINTMENT (OUTPATIENT)
Dept: GENERAL RADIOLOGY | Facility: CLINIC | Age: 52
End: 2017-07-10
Attending: NURSE PRACTITIONER
Payer: COMMERCIAL

## 2017-07-10 LAB
ABO + RH BLD: NORMAL
ABO + RH BLD: NORMAL
ALBUMIN SERPL-MCNC: 1.8 G/DL (ref 3.4–5)
ALP SERPL-CCNC: 116 U/L (ref 40–150)
ALT SERPL W P-5'-P-CCNC: 34 U/L (ref 0–70)
ANION GAP SERPL CALCULATED.3IONS-SCNC: 12 MMOL/L (ref 3–14)
ANION GAP SERPL CALCULATED.3IONS-SCNC: 8 MMOL/L (ref 3–14)
ANISOCYTOSIS BLD QL SMEAR: SLIGHT
ANISOCYTOSIS BLD QL SMEAR: SLIGHT
AST SERPL W P-5'-P-CCNC: 67 U/L (ref 0–45)
BACTERIA SPEC CULT: NO GROWTH
BASOPHILS # BLD AUTO: 0 10E9/L (ref 0–0.2)
BASOPHILS # BLD AUTO: 0.1 10E9/L (ref 0–0.2)
BASOPHILS NFR BLD AUTO: 0 %
BASOPHILS NFR BLD AUTO: 0.9 %
BILIRUB SERPL-MCNC: 0.7 MG/DL (ref 0.2–1.3)
BLD GP AB SCN SERPL QL: NORMAL
BLOOD BANK CMNT PATIENT-IMP: NORMAL
BUN SERPL-MCNC: 125 MG/DL (ref 7–30)
BUN SERPL-MCNC: 136 MG/DL (ref 7–30)
BURR CELLS BLD QL SMEAR: SLIGHT
CALCIUM SERPL-MCNC: 8.5 MG/DL (ref 8.5–10.1)
CALCIUM SERPL-MCNC: 8.8 MG/DL (ref 8.5–10.1)
CHLORIDE SERPL-SCNC: 116 MMOL/L (ref 94–109)
CHLORIDE SERPL-SCNC: 117 MMOL/L (ref 94–109)
CMV DNA SPEC NAA+PROBE-ACNC: 145 [IU]/ML
CMV DNA SPEC NAA+PROBE-LOG#: 2.2 {LOG_IU}/ML
CO2 SERPL-SCNC: 21 MMOL/L (ref 20–32)
CO2 SERPL-SCNC: 22 MMOL/L (ref 20–32)
CREAT SERPL-MCNC: 2.54 MG/DL (ref 0.66–1.25)
CREAT SERPL-MCNC: 2.62 MG/DL (ref 0.66–1.25)
DIFFERENTIAL METHOD BLD: ABNORMAL
DIFFERENTIAL METHOD BLD: ABNORMAL
EOSINOPHIL # BLD AUTO: 0 10E9/L (ref 0–0.7)
EOSINOPHIL # BLD AUTO: 0.1 10E9/L (ref 0–0.7)
EOSINOPHIL NFR BLD AUTO: 0 %
EOSINOPHIL NFR BLD AUTO: 0.9 %
ERYTHROCYTE [DISTWIDTH] IN BLOOD BY AUTOMATED COUNT: 15.3 % (ref 10–15)
ERYTHROCYTE [DISTWIDTH] IN BLOOD BY AUTOMATED COUNT: 15.4 % (ref 10–15)
GFR SERPL CREATININE-BSD FRML MDRD: 26 ML/MIN/1.7M2
GFR SERPL CREATININE-BSD FRML MDRD: 27 ML/MIN/1.7M2
GLUCOSE BLDC GLUCOMTR-MCNC: 112 MG/DL (ref 70–99)
GLUCOSE BLDC GLUCOMTR-MCNC: 114 MG/DL (ref 70–99)
GLUCOSE BLDC GLUCOMTR-MCNC: 121 MG/DL (ref 70–99)
GLUCOSE BLDC GLUCOMTR-MCNC: 129 MG/DL (ref 70–99)
GLUCOSE BLDC GLUCOMTR-MCNC: 131 MG/DL (ref 70–99)
GLUCOSE BLDC GLUCOMTR-MCNC: 141 MG/DL (ref 70–99)
GLUCOSE BLDC GLUCOMTR-MCNC: 146 MG/DL (ref 70–99)
GLUCOSE BLDC GLUCOMTR-MCNC: 146 MG/DL (ref 70–99)
GLUCOSE BLDC GLUCOMTR-MCNC: 153 MG/DL (ref 70–99)
GLUCOSE BLDC GLUCOMTR-MCNC: 153 MG/DL (ref 70–99)
GLUCOSE BLDC GLUCOMTR-MCNC: 160 MG/DL (ref 70–99)
GLUCOSE BLDC GLUCOMTR-MCNC: 162 MG/DL (ref 70–99)
GLUCOSE BLDC GLUCOMTR-MCNC: 166 MG/DL (ref 70–99)
GLUCOSE BLDC GLUCOMTR-MCNC: 182 MG/DL (ref 70–99)
GLUCOSE BLDC GLUCOMTR-MCNC: 201 MG/DL (ref 70–99)
GLUCOSE SERPL-MCNC: 153 MG/DL (ref 70–99)
GLUCOSE SERPL-MCNC: 171 MG/DL (ref 70–99)
HCT VFR BLD AUTO: 26.5 % (ref 40–53)
HCT VFR BLD AUTO: 27.4 % (ref 40–53)
HGB BLD-MCNC: 8.5 G/DL (ref 13.3–17.7)
HGB BLD-MCNC: 8.8 G/DL (ref 13.3–17.7)
INR PPP: 1.17 (ref 0.86–1.14)
LACTATE BLD-SCNC: 0.9 MMOL/L (ref 0.7–2.1)
LYMPHOCYTES # BLD AUTO: 0.3 10E9/L (ref 0.8–5.3)
LYMPHOCYTES # BLD AUTO: 0.3 10E9/L (ref 0.8–5.3)
LYMPHOCYTES NFR BLD AUTO: 2.7 %
LYMPHOCYTES NFR BLD AUTO: 2.8 %
MAGNESIUM SERPL-MCNC: 2.3 MG/DL (ref 1.6–2.3)
MAGNESIUM SERPL-MCNC: 2.4 MG/DL (ref 1.6–2.3)
MCH RBC QN AUTO: 28.2 PG (ref 26.5–33)
MCH RBC QN AUTO: 28.5 PG (ref 26.5–33)
MCHC RBC AUTO-ENTMCNC: 32.1 G/DL (ref 31.5–36.5)
MCHC RBC AUTO-ENTMCNC: 32.1 G/DL (ref 31.5–36.5)
MCV RBC AUTO: 88 FL (ref 78–100)
MCV RBC AUTO: 89 FL (ref 78–100)
METAMYELOCYTES # BLD: 0.3 10E9/L
METAMYELOCYTES # BLD: 0.6 10E9/L
METAMYELOCYTES NFR BLD MANUAL: 2.7 %
METAMYELOCYTES NFR BLD MANUAL: 5.6 %
MICRO REPORT STATUS: NORMAL
MONOCYTES # BLD AUTO: 0 10E9/L (ref 0–1.3)
MONOCYTES # BLD AUTO: 0.2 10E9/L (ref 0–1.3)
MONOCYTES NFR BLD AUTO: 0 %
MONOCYTES NFR BLD AUTO: 1.9 %
MYELOCYTES # BLD: 0.5 10E9/L
MYELOCYTES NFR BLD MANUAL: 4.5 %
NEUTROPHILS # BLD AUTO: 9.4 10E9/L (ref 1.6–8.3)
NEUTROPHILS # BLD AUTO: 9.9 10E9/L (ref 1.6–8.3)
NEUTROPHILS NFR BLD AUTO: 88.3 %
NEUTROPHILS NFR BLD AUTO: 89.7 %
NRBC # BLD AUTO: 0.1 10*3/UL
NRBC BLD AUTO-RTO: 1 /100
PHOSPHATE SERPL-MCNC: 3.5 MG/DL (ref 2.5–4.5)
PHOSPHATE SERPL-MCNC: 4.4 MG/DL (ref 2.5–4.5)
PLATELET # BLD AUTO: 71 10E9/L (ref 150–450)
PLATELET # BLD AUTO: 75 10E9/L (ref 150–450)
PLATELET # BLD EST: ABNORMAL 10*3/UL
PLATELET # BLD EST: ABNORMAL 10*3/UL
POIKILOCYTOSIS BLD QL SMEAR: SLIGHT
POTASSIUM SERPL-SCNC: 3.3 MMOL/L (ref 3.4–5.3)
POTASSIUM SERPL-SCNC: 3.9 MMOL/L (ref 3.4–5.3)
POTASSIUM SERPL-SCNC: 4 MMOL/L (ref 3.4–5.3)
PREALB SERPL IA-MCNC: 7 MG/DL (ref 15–45)
PROT SERPL-MCNC: 5 G/DL (ref 6.8–8.8)
RBC # BLD AUTO: 3.01 10E12/L (ref 4.4–5.9)
RBC # BLD AUTO: 3.09 10E12/L (ref 4.4–5.9)
SODIUM SERPL-SCNC: 148 MMOL/L (ref 133–144)
SODIUM SERPL-SCNC: 149 MMOL/L (ref 133–144)
SPECIMEN EXP DATE BLD: NORMAL
SPECIMEN SOURCE: ABNORMAL
SPECIMEN SOURCE: NORMAL
TACROLIMUS BLD-MCNC: 4.9 UG/L (ref 5–15)
TME LAST DOSE: ABNORMAL H
TRIGL SERPL-MCNC: 177 MG/DL
WBC # BLD AUTO: 10.5 10E9/L (ref 4–11)
WBC # BLD AUTO: 11.2 10E9/L (ref 4–11)

## 2017-07-10 PROCEDURE — 84478 ASSAY OF TRIGLYCERIDES: CPT | Performed by: SURGERY

## 2017-07-10 PROCEDURE — 25000132 ZZH RX MED GY IP 250 OP 250 PS 637: Performed by: SURGERY

## 2017-07-10 PROCEDURE — 25000128 H RX IP 250 OP 636: Performed by: SURGERY

## 2017-07-10 PROCEDURE — 25000128 H RX IP 250 OP 636: Performed by: NURSE PRACTITIONER

## 2017-07-10 PROCEDURE — 84100 ASSAY OF PHOSPHORUS: CPT | Performed by: SURGERY

## 2017-07-10 PROCEDURE — 40000196 ZZH STATISTIC RAPCV CVP MONITORING

## 2017-07-10 PROCEDURE — 44500 INTRO GASTROINTESTINAL TUBE: CPT

## 2017-07-10 PROCEDURE — 40000986 XR ABDOMEN PORT F1 VW

## 2017-07-10 PROCEDURE — 20000004 ZZH R&B ICU UMMC

## 2017-07-10 PROCEDURE — 40000014 ZZH STATISTIC ARTERIAL MONITORING DAILY

## 2017-07-10 PROCEDURE — 25000132 ZZH RX MED GY IP 250 OP 250 PS 637: Performed by: STUDENT IN AN ORGANIZED HEALTH CARE EDUCATION/TRAINING PROGRAM

## 2017-07-10 PROCEDURE — 25000132 ZZH RX MED GY IP 250 OP 250 PS 637

## 2017-07-10 PROCEDURE — 84132 ASSAY OF SERUM POTASSIUM: CPT | Performed by: INTERNAL MEDICINE

## 2017-07-10 PROCEDURE — 80053 COMPREHEN METABOLIC PANEL: CPT | Performed by: SURGERY

## 2017-07-10 PROCEDURE — 80048 BASIC METABOLIC PNL TOTAL CA: CPT | Performed by: SURGERY

## 2017-07-10 PROCEDURE — 25000125 ZZHC RX 250: Performed by: TRANSPLANT SURGERY

## 2017-07-10 PROCEDURE — 99291 CRITICAL CARE FIRST HOUR: CPT | Performed by: NURSE PRACTITIONER

## 2017-07-10 PROCEDURE — 25000132 ZZH RX MED GY IP 250 OP 250 PS 637: Performed by: NURSE PRACTITIONER

## 2017-07-10 PROCEDURE — 83735 ASSAY OF MAGNESIUM: CPT | Performed by: SURGERY

## 2017-07-10 PROCEDURE — 94003 VENT MGMT INPAT SUBQ DAY: CPT

## 2017-07-10 PROCEDURE — 85025 COMPLETE CBC W/AUTO DIFF WBC: CPT | Performed by: SURGERY

## 2017-07-10 PROCEDURE — 83605 ASSAY OF LACTIC ACID: CPT | Performed by: STUDENT IN AN ORGANIZED HEALTH CARE EDUCATION/TRAINING PROGRAM

## 2017-07-10 PROCEDURE — 25000125 ZZHC RX 250: Performed by: NURSE PRACTITIONER

## 2017-07-10 PROCEDURE — 25000131 ZZH RX MED GY IP 250 OP 636 PS 637: Performed by: SURGERY

## 2017-07-10 PROCEDURE — 25000131 ZZH RX MED GY IP 250 OP 636 PS 637: Performed by: TRANSPLANT SURGERY

## 2017-07-10 PROCEDURE — 25000125 ZZHC RX 250: Performed by: PHYSICIAN ASSISTANT

## 2017-07-10 PROCEDURE — 86900 BLOOD TYPING SEROLOGIC ABO: CPT | Performed by: SURGERY

## 2017-07-10 PROCEDURE — 40000275 ZZH STATISTIC RCP TIME EA 10 MIN

## 2017-07-10 PROCEDURE — 00000146 ZZHCL STATISTIC GLUCOSE BY METER IP

## 2017-07-10 PROCEDURE — 25000128 H RX IP 250 OP 636: Performed by: PHYSICIAN ASSISTANT

## 2017-07-10 PROCEDURE — 85610 PROTHROMBIN TIME: CPT | Performed by: SURGERY

## 2017-07-10 PROCEDURE — 84134 ASSAY OF PREALBUMIN: CPT | Performed by: SURGERY

## 2017-07-10 PROCEDURE — 71010 XR CHEST PORT 1 VW: CPT

## 2017-07-10 PROCEDURE — 86850 RBC ANTIBODY SCREEN: CPT | Performed by: SURGERY

## 2017-07-10 PROCEDURE — 80197 ASSAY OF TACROLIMUS: CPT | Performed by: PHYSICIAN ASSISTANT

## 2017-07-10 PROCEDURE — 86901 BLOOD TYPING SEROLOGIC RH(D): CPT | Performed by: SURGERY

## 2017-07-10 RX ORDER — DEXTROSE MONOHYDRATE 50 MG/ML
INJECTION, SOLUTION INTRAVENOUS CONTINUOUS
Status: DISCONTINUED | OUTPATIENT
Start: 2017-07-10 | End: 2017-07-14

## 2017-07-10 RX ORDER — OXYCODONE HYDROCHLORIDE 5 MG/1
5 TABLET ORAL EVERY 4 HOURS PRN
Status: DISCONTINUED | OUTPATIENT
Start: 2017-07-10 | End: 2017-07-11

## 2017-07-10 RX ORDER — FUROSEMIDE 10 MG/ML
40 INJECTION INTRAMUSCULAR; INTRAVENOUS
Status: DISCONTINUED | OUTPATIENT
Start: 2017-07-10 | End: 2017-07-10

## 2017-07-10 RX ADMIN — DEXTROSE MONOHYDRATE: 50 INJECTION, SOLUTION INTRAVENOUS at 11:02

## 2017-07-10 RX ADMIN — HUMAN INSULIN 8 UNITS/HR: 100 INJECTION, SOLUTION SUBCUTANEOUS at 12:14

## 2017-07-10 RX ADMIN — POTASSIUM CHLORIDE 20 MEQ: 1.5 POWDER, FOR SOLUTION ORAL at 17:28

## 2017-07-10 RX ADMIN — OXYCODONE HYDROCHLORIDE 10 MG: 5 TABLET ORAL at 04:33

## 2017-07-10 RX ADMIN — TACROLIMUS 3 MG: 5 CAPSULE ORAL at 19:53

## 2017-07-10 RX ADMIN — ERTAPENEM SODIUM 1 G: 1 INJECTION, POWDER, LYOPHILIZED, FOR SOLUTION INTRAMUSCULAR; INTRAVENOUS at 09:14

## 2017-07-10 RX ADMIN — I.V. FAT EMULSION 250 ML: 20 EMULSION INTRAVENOUS at 20:28

## 2017-07-10 RX ADMIN — TACROLIMUS 4 MG: 5 CAPSULE ORAL at 08:18

## 2017-07-10 RX ADMIN — FLUDROCORTISONE ACETATE 0.1 MG: 0.1 TABLET ORAL at 08:18

## 2017-07-10 RX ADMIN — LIDOCAINE HYDROCHLORIDE 5 ML: 20 SOLUTION ORAL; TOPICAL at 15:30

## 2017-07-10 RX ADMIN — FENTANYL CITRATE 50 MCG: 50 INJECTION, SOLUTION INTRAMUSCULAR; INTRAVENOUS at 02:19

## 2017-07-10 RX ADMIN — QUETIAPINE FUMARATE 50 MG: 50 TABLET, FILM COATED ORAL at 04:33

## 2017-07-10 RX ADMIN — HUMAN INSULIN 10 UNITS/HR: 100 INJECTION, SOLUTION SUBCUTANEOUS at 03:38

## 2017-07-10 RX ADMIN — OXYCODONE HYDROCHLORIDE 10 MG: 5 TABLET ORAL at 10:17

## 2017-07-10 RX ADMIN — POTASSIUM & SODIUM PHOSPHATES POWDER PACK 280-160-250 MG 1 PACKET: 280-160-250 PACK at 08:18

## 2017-07-10 RX ADMIN — HUMAN INSULIN 10 UNITS/HR: 100 INJECTION, SOLUTION SUBCUTANEOUS at 06:09

## 2017-07-10 RX ADMIN — OXYCODONE HYDROCHLORIDE 5 MG: 5 TABLET ORAL at 20:28

## 2017-07-10 RX ADMIN — POTASSIUM CHLORIDE 20 MEQ: 1.5 POWDER, FOR SOLUTION ORAL at 10:08

## 2017-07-10 RX ADMIN — CALCIUM GLUCONATE: 94 INJECTION, SOLUTION INTRAVENOUS at 20:27

## 2017-07-10 RX ADMIN — OXYCODONE HYDROCHLORIDE 5 MG: 5 TABLET ORAL at 14:13

## 2017-07-10 RX ADMIN — POTASSIUM CHLORIDE 40 MEQ: 1.5 POWDER, FOR SOLUTION ORAL at 04:33

## 2017-07-10 RX ADMIN — FUROSEMIDE 40 MG: 10 INJECTION, SOLUTION INTRAVENOUS at 12:15

## 2017-07-10 RX ADMIN — PANTOPRAZOLE SODIUM 40 MG: 40 TABLET, DELAYED RELEASE ORAL at 08:18

## 2017-07-10 NOTE — PROCEDURES
Bridle Placement:   Reason for bridle placement: Secure FT   Medicine delivered during procedure: lidocaine gel   Procedure: Successful   Location of top of clip on FT: @ 113 cm marker   Condition of nose/skin at time of bridle placement: Unremarkable   Face to Face time with patient: 5 minutes.    Janice Christianson ,RD, LD, CNSC (pgr [phone])

## 2017-07-10 NOTE — PHARMACY-CONSULT NOTE
Pharmacy Tube Feeding Consult    Medication reviewed for administration by feeding tube and for potential food/drug interactions.    Recommendation: No changes are needed at this time.     Pharmacy will continue to follow as new medications are ordered.    Mikel McmanusD

## 2017-07-10 NOTE — PROGRESS NOTES
[facility]  PROGRESS NOTE - HEMATOLOGY      Camacho Bhagat MRN# [id number]   Age: 51 year old YOB: 1965     Date of Consultation: July 10, 2017  Primary care provider: Shay Kirkpatrick         Assessment and Plan:     Assessment:  51 year old male with multiple co morbidities and Hx of liver transplant (3/2017) for ESLD and HCC (s/p  TACE x2) due to CASTAÑEDA (March2017) on chronic immunosuppressive therapy who is admitted to hospital for concern for neutropenic colitis and septic shock. He  is now s/p POD2 s/p Exlap and washout with no evidence of ischemic bowel. The hospital course has been complicated by development of severe neutropenia (ANC <500), thrombocytopenia and anemia. Peripheral smear, Total bili here has been unremarkable, and LDH is only mildly elevated. Absolute Retic count is decreased suggesting marrow suppression.  Of note, pt has had thrmobocytopenia for a long time since his ESLD in 2016. Certainly since 6/2017 his counts of all cell lines have been progressively declining while he was on Tacrolimus, mycophenolate, Bactrim and Valgancyclovir. It is not clear if the dosing of these drugs was changed around that time. His current presentation of pancytopenia is multi-factorial from a combination of immunosuppression (mycophenolate), medication (Bactrim, Valgancyclovir) and sepsis related BM suppression. Pt also has received short course of Zosyn which has also been associated with thrombocytopenia. Vancomycin has also been associated with thrombocytopenia with incidence of ~1%. Primary bone marrow failure syndromes like MDS, leukemia are arthur likely. Other D/D to consider are autoimmune causes (Hx of RA), nutritional causes like Vit B12 deficiecny, although less likely. Hopefully the counts should improve with time.      Plan:     -Agree with stopping Mycophenolate, TMP-SMX, Zosyn, Valgancyclovir for now  -Continue Filgrastim until ANC>1000 for 2 consecutive  days  -Continue Broad spectrum ABx as per primary team/transplant ID  -Gaol Hb >7 gm, transfuse as necessary  -Platelet Transfusion not necessary unless Plt count <66638; or 50,000 with active bleeding  -Monitor signs for bleeding  -No indication for BM biopsy at this time     Pt  discussed with Dr. Griffin. Agree with documentation below by MS4 Zane Vernon.    Catrachito Drake MD  Hematology Oncology fellow  [phone]       SUBJECTIVE:     No acute events overnight. Chest tube placed in R to drain pleural effusion. Low grade fever on AM, no bleeding or bruising. Serous output per chest tube. Failed PSTs this morning and previous yesterday.   ROS:  Unable to perform due to patient sedation and intubation.    OBJECTIVE   Vital Signs: /76  Pulse 101  Temp 99.5  F (37.5  C) (Axillary)  Resp 27  Ht 1.829 m (6')  Wt 96.3 kg (212 lb 4.9 oz)  SpO2 97%  BMI 28.79 kg/m2, BMI= Body mass index is 28.79 kg/(m^2)..    Physical Exam:   General:  Intubated and sedated   Skin:  Cyanosis or purpling of toes continues, found to be not blanchable today on exam.  HEENT: NC/AT. Sclera slightly icteric, pupils pinpoint but reactive to light though sluggish, no APD.  Pulm: Rhonchorous RUL breath sounds, otherwise CTA B/L  CV: PPs 2+ B/L. RRR no m/r/g. Trace Bilateral LE Edema.  GI: Abd soft, no mass.  : External genitalia with no visible ulcers.  MSK: WWPx4.   CNS: Withdraws to pain and moves ipsilateral limbs to stimulus.     Laboratory   Recent Labs   Lab Test  07/10/17   0340  07/09/17   1614  07/09/17   0321   07/08/17   0330   07/07/17   0309   02/08/16   1144   04/11/11   1209   02/15/10   1232   WBC  10.5  9.9  7.0   < >  4.0   < >  1.2*   < >  5.1   < >  8.6   < >  5.5   HGB  8.5*  8.2*  7.8*   < >  7.9*   < >  7.3*   < >  13.8   < >  16.6   < >  15.5   HCT  26.5*  25.3*  24.2*   < >  23.9*   < >  21.9*   < >  39.0*   < >  46.3   < >  44.1   MCV  88  88  87   < >  88   < >  87   < >  92   < >  90   < >  89   PLT   71*  60*  56*   < >  35*   < >  27*   < >  67*   < >  123*   < >  109*   BUN  125*  127*  114*   < >  90*   < >  76*   < >  14   < >  15   < >   --    TSH   --    --    --    --    --    --    --    --   3.35   --   2.77   --   2.53   AST  67*   --    --    --   29   --   25   < >  47*   < >  58*   < >   --    ALT  34   --    --    --   15   --   16   < >  66   < >  85*   < >   --    ALKPHOS  116   --    --    --   48   --   35*   < >  185*   < >  176*   < >   --    ALBUMIN  1.8*   --    --    --   2.0*   --   2.2*   < >  3.2*   < >  4.7   < >   --    PROTTOTAL  5.0*   --    --    --   5.0*   --   4.7*   < >  7.0   < >   --    < >   --    T4   --    --    --    --    --    --    --    --    --    --   1.23   --   0.90    < > = values in this interval not displayed.        Imaging  Us Liver Transplant Portable    Result Date: 7/5/2017  EXAMINATION: US LIVER TRANSPLANT PORTABLE, 7/5/2017 11:13 AM COMPARISON: Liver transplant ultrasound 3/5/2017, CT abdomen pelvis 7/4/2017 HISTORY: Post liver transplantation with high lactate. Exploratory laparotomy 7/5/2017 with post op diagnosis of typhlitis TECHNIQUE:  Gray-scale, color Doppler and spectral flow analysis. FINDINGS: There is trace ascites. Liver:   The liver demonstrates normal homogeneous echotexture. No evidence of a focal hepatic mass. Bile Ducts: Both the intra- and extrahepatic biliary system are of normal caliber.  The common bile duct measures 3 mm in diameter. Gallbladder: The gallbladder is surgically absent. Kidneys: Right kidney:  The right kidney demonstrates normal echotexture with no evidence of a shadowing stone, focal mass or hydronephrosis.   13.0 cm in long axis dimension. Left kidney:  The left kidney demonstrates normal echotexture with no evidence of a shadowing stone, focal mass or hydronephrosis.   12.6 cm in long axis dimension. Pancreas: The pancreas is obscured by overlying bowel gas. Spleen:  The spleen is enlarged with perisplenic varices,  measuring 19.4 cm. In the sagittal dimension Visualized portions of the aorta are unremarkable. LIVER DOPPLER: Splenic vein:  Patent continuous normal antegrade direction flow towards the liver, 24 cm/sec. Extrahepatic portal vein:  Patent continuous antegrade flow, 53 cm/sec. Portal vein at anastomosis: Patent continuous antegrade flow, 71 cm/sec. Intrahepatic portal vein:  Patent continuous antegrade flow, 93 cm/sec. Right portal vein flow is antegrade, measuring 38 cm/sec. Left portal vein flow is antegrade, measuring 25 cm/sec. Inferior vena cava: patent with flow toward the heart throughout.. IVC above anastomosis:  104 cm/sec. IVC at anastomosis:  80 cm/sec. Intrahepatic IVC:  57 cm/sec. Extrahepatic IVC:  39 cm/sec. Right, mid, left hepatic veins: Patent with flow towards the inferior vena cava. Extrahepatic hepatic artery: Improved appearance of the extrahepatic waveforms, now with low-resistance appearance with flow towards the liver. 109 cm/sec with resistive index 0.76, previously 1.0 on immediate postoperative liver transplant ultrasound 3/5/2017 Right hepatic artery: 84 cm/sec with resistive index 0.72. Left hepatic artery: 42 cm/sec with resistive index 0.74.     Impression: 1.  Unremarkable appearance of the transplant liver. 2.  Normal Doppler evaluation of the post transplant liver. Interval improvement in waveform and resistive indices of the extrahepatic artery compared to the immediate postoperative ultrasound on 3/5/2017. I have personally reviewed the examination and initial interpretation and I agree with the findings. DALLIN RENAE MD    Xr Chest Port 1 View    Result Date: 7/10/2017  EXAM: XR CHEST PORT 1 VW  7/10/2017 1:40 AM HISTORY:  intubated   COMPARISON: 7/9/2017 FINDINGS: ET tube tip over mid thoracic trachea. Right IJ central line tip terminates over the mid SVC. Right basilar chest tube is in place and unchanged. Gastric tube tip projects below the diaphragm with limited  visualization of the tip and sidehole. Stable cardiac silhouette. Stable right pleural effusion. Persistent perihilar and bibasilar opacities with dense retrocardiac opacity. Low lung volumes. No pneumothorax.     IMPRESSION: Stable perihilar/bibasilar opacities and right pleural effusion. I have personally reviewed the examination and initial interpretation and I agree with the findings. DIAZ NORMAN MD    Xr Chest Port 1 View    Result Date: 7/9/2017  Exam: Chest x-ray  7/9/2017 5:13 PM  History: Right pleural drain Comparison: Radiograph same date Findings: Stable positioning of endotracheal tube, right-sided central catheter, and enteric tube. New right-sided chest tube. Significantly decreased right-sided pleural effusion and right pulmonary opacities. Unchanged mild left basilar opacities. No pneumothorax. Cardiac silhouette is within normal limits. Trachea is midline. Surgical clips project over the upper abdomen.     Impression: 1. Significantly decreased right pleural effusion and improved right pulmonary opacities following placement of right-sided chest tube. 2. Unchanged mild left basilar opacities. I have personally reviewed the examination and initial interpretation and I agree with the findings. BLANCA HUYNH MD    Xr Chest Port 1 View    Result Date: 7/9/2017  Examination: XR CHEST PORT 1 VW, 7/9/2017 2:58 AM Comparison: Chest radiograph and CT 7/8/2017 History: intubated Findings: Endotracheal tube tip at the level of the mid thoracic trachea. Right IJ central venous catheter tip at the level of the low SVC. Enteric tube extends towards the left upper quadrant, tip not included in the field-of-view. Cardiac silhouette is obscured. Large right-sided pleural effusion, with hazy opacification of the right hemithorax at least partially related to layering effusion. Left basilar opacities are grossly unchanged. The left costophrenic angle is not included in the field-of-view. No pneumothorax.      Impression: Large right-sided pleural effusion stable. Bilateral opacities are grossly similar accounting for differences in patient positioning, could represent edema or infection. I have personally reviewed the examination and initial interpretation and I agree with the findings. DALLIN RENAE MD    Xr Chest Port 1 View    Result Date: 7/8/2017  Exam: Chest x-ray  7/8/2017 4:42 AM  History: Intubated Comparison: Radiograph 7/7/2017 Findings: Stable positioning of endotracheal tube, enteric tube, and right-sided internal jugular catheter. Cardiac silhouette is within normal limits. Trachea is midline. Worsened layering pleural effusion within the right hemithorax and moderate left pleural effusion with associated left basilar opacities. No pneumothorax. Visualized abdomen is normal.     Impression: 1. Worsened layering right pleural effusion with associated right pulmonary opacities. 2. Stable left pleural effusion with associated left basilar atelectasis versus consolidation. I have personally reviewed the examination and initial interpretation and I agree with the findings. ROB STARK MD    Xr Chest Port 1 View    Result Date: 7/7/2017  Exam: XR CHEST PORT 1 VW  7/7/2017 1:39 AM  History: intubated Comparison: Radiograph 7/6/2017 Findings: Stable positioning of endotracheal tube, enteric tube, and right-sided PICC. Cardiac silhouette is obscured and unchanged. Trachea is midline. Unchanged bilateral pleural effusions. No pneumothorax. Bibasilar opacities. Visualized abdomen is normal.     Impression: Unchanged bilateral pleural effusions with associated bibasilar atelectasis versus consolidation. I have personally reviewed the examination and initial interpretation and I agree with the findings. BASILIO SAGASTUME MD    Xr Chest Port 1 View    Result Date: 7/6/2017  Exam: XR CHEST PORT 1 VW, 7/6/2017 9:26 AM Indication: follow up Comparison: 7/5/2017. Findings: Single AP view the chest. ET  tube tip approximately 6.1 cm from the chacorta, right IJ central venous catheter with tip at the SVC, and enteric tube with tip outside the field-of-view. Surgical clips over the right upper quadrant. Stable low lung volumes. Cardiac silhouette is unchanged in largely obscured. Pulmonary vascular is indistinct. Increased layering right-sided pleural effusion and stable small left-sided pleural effusion. Increase in diffuse airspace opacities. No pneumothorax.     Impression: 1. Low lung volumes with increasing diffuse airspace opacities, right lung predominant. Worsening pulmonary edema with underlying infection versus atelectasis. 2. Increased small to moderate layering right-sided pleural effusion and small left-sided pleural effusion. I have personally reviewed the examination and initial interpretation and I agree with the findings. DAMIEN LIRA MD    Xr Chest Port 1 View    Result Date: 7/5/2017  XR CHEST PORT 1 VW  7/5/2017 10:47 AM  HISTORY: follow up COMPARISON: 7/4/2017 FINDINGS: ET tube tip projects over the distal trachea, approximately 3.8 cm from the chacorta. Right IJ approach intravenous catheter tip projects over the mid SVC. Enteric tube passes inferior left hemidiaphragm, tip out of the field of view. Surgical clips project over the right upper quadrant/right lung base. No pneumothorax. Bilateral pleural effusions. Bibasilar airspace opacities. Cardiac silhouette is not enlarged.     IMPRESSION: 1. Increasingly prominent bilateral pleural effusions. 2. Diffuse patchy airspace opacities, which may represent pulmonary edema or pneumonia. I have personally reviewed the examination and initial interpretation and I agree with the findings. DAMIEN LIRA MD    Xr Chest Port 1 View    Result Date: 7/4/2017  EXAM: XR CHEST PORT 1 VW  7/4/2017 6:48 PM  HISTORY: ET tube placement after 3cm advance COMPARISON: Chest x-ray from earlier today 7/4/2017 2:06 PM FINDINGS: Portable AP views of the chest obtained.  Endotracheal tube tip projects 5.3 cm from the chacorta. Right IJ central line tip projects over the mid SVC. Enteric tube courses beyond the margins of the film. The trachea is midline. The cardiac silhouette is within normal limits. Increased hazy opacification of the right hemithorax, suggestive of underlying pleural fluid. Increased left pleural effusion. Unchanged patchy bibasilar opacities.     IMPRESSION: 1. Endotracheal tube tip projects 5.3 cm from the chacorta. 2. Increased bilateral pleural effusions, right greater than left. 3. Unchanged bibasilar patchy opacities. I have personally reviewed the examination and initial interpretation and I agree with the findings. ROB STARK MD    Xr Chest Port 1 View    Result Date: 7/4/2017  EXAM: XR CHEST PORT 1 VW  7/4/2017 2:13 PM  HISTORY: endotracheal tube positioning COMPARISON: Chest x-ray 3/6/2017 FINDINGS: Portable AP view of the chest obtained. The endotracheal tube tip projects 7 cm from the chacorta. Enteric tube courses beyond the margins of film. Right IJ central line tip projects over the mid SVC. The trachea is midline. The cardiac silhouette is within normal limits. Low lung volumes. Hazy opacification of the costophrenic angles bilaterally. No pneumothorax. Mild left streaky opacities.     IMPRESSION: 1. Endotracheal tube tip projects 7 cm from the chacorta. 2. Bilateral pleural effusions with mild overlying opacities, most likely atelectasis. I have personally reviewed the examination and initial interpretation and I agree with the findings. ROB STARK MD    Xr Abdomen Port 2 Views    Result Date: 7/4/2017  EXAM: XR ABDOMEN PORT 2 VW  7/4/2017 7:40 AM  HISTORY: to rule out intestinal perforation and free air COMPARISON: X-ray 4/12/2017 FINDINGS: Portable AP views of the abdomen obtained. Dilated loop of bowel in the central abdomen, which appears to contain stool, likely sigmoid. No evidence of free intraperitoneal air. Mild-to-moderate  colonic stool burden. Surgical clips in the right upper quadrant. Degenerative changes of the lumbar spine.     IMPRESSION: 1. No evidence of pneumoperitoneum. 2. Dilated loop of bowel in the central abdomen, likely sigmoid. I have personally reviewed the examination and initial interpretation and I agree with the findings. ROB STARK MD    Ct Chest W/o Contrast    Result Date: 7/8/2017  EXAMINATION: Chest CT  7/8/2017 CLINICAL HISTORY: Pleural effusions, pulmonary infiltrates.h/o cirrhosis s/p OLT 3/2017 who is admitted with neutropenia and colitis, s/p exlap COMPARISON: CT abdomen pelvis dated 7/4/2017, chest radiograph dated 7/8/2017. TECHNIQUE: CT imaging obtained through the chest without intravenous contrast. Coronal and axial MIP reformatted images obtained. FINDINGS: Endotracheal tube is present. Heart size is enlarged. No significant pericardial effusion. Prominent pulmonary artery measures up to 4.1 cm. No thoracic lymphadenopathy. Perihilar groundglass attenuation suspicious of pulmonary edema. Large pleural effusion on right and small pleural effusion on left overlying on the bilateral lower lobe atelectasis. Bones and soft tissues: Degenerative changes of the thoracic and lumbar spine. Wedge shaped deformity of T8. No suspicious bone findings. Partially imaged upper abdomen: Limited. Multiple surgical clips and enteric tube present.     IMPRESSION: Large pleural effusion on right and small pleural effusion on left overlying bilateral lower lobe atelectasis. Bibasilar infection or aspiration cannot excluded. 1.  Cardiomegaly. Prominent pulmonary artery measures up to 4.1 cm. 2.  Perihilar groundglass attenuation suspicious of pulmonary edema. Anasarca. I have personally reviewed the examination and initial interpretation and I agree with the findings. MD Zane DOBBINS Ph.D., MS3  Provider: Dr. Gordillo

## 2017-07-10 NOTE — PROGRESS NOTES
[facility]  Transplant Infectious Disease Progress Note     Patient:  Camacho Bhagat, Date of birth 1965, Medical record number [id number]  Date of Visit:  07/10/2017         Assessment and Recommendations:   Recommendations:  - continue ertapenem for now  - would image abdomen to rule out any abscess formation as he was febrile on ertapenem over the weekend  - follow weekly CMV PCR while off valcyte  - would not treat VRE in sputum - with his low oxygen requirements and unchanged CXR would hold on antibiotics. No clear evidence of aspiration pneumonia.   - agree with decrease in immunosuppression    Transplant Infectious Disease will continue to follow with you.    Assessment: 51 y.o. 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 He is now s/p ex-lap and washout with wound closure. We are asked to comment on antibiotics and ongoing workup.     Colitis - he developed acute onset of pain. He is s/p ex-lap on 7/4 showing inflamed cecum and ascending colon. He had wound closure on 7/5. Cultures from intra-op showed staph capitis  in anaerobic culture  In terms of antibiotic selection, zosyn alone should be sufficient in covering anaerobes and enteric gram negatives. His pancytopenia pre-existed zosyn administration and pancytopenia is a very uncommon occurrence with zosyn. He has been maintained on ertapenem which is adequate coverage but did have fevers this weekend. Would image abdomen to rule out any fluid collections.     Pancytopenia - ongoing since early June. Likely related to his immunosuppression. Heme/onc now following. Agree with decrease in immunosuppressive regimen. His valcyte has been stopped. His counts are improving      CMV+ PCR  - likely due to level of immunosuppression and cessation of valcyte. He is high risk being D+/R- and thus would check CMV DNA PCR weekly while off valcyte    VRE in  sputum - isolated from BAL. He otherwise has had minimal Oxygen requirements and unchanged imaging. He is colonized with VRE (rectal swab).  If inclined to treat, would favor tigecycline over synercid.      Other ID issues:  Bacterial PPx: on antibiotics for colitis  PJP ppx: off bactrim 2/2 leukopenia  Fungal Ppx: none  SeroStatus: CMV D+/R-, EBV D+/R-  Gamma Globulin Status: last checked in 2011  Immunization Status: not yet done     Discussed with Dr. Francis Dotson D.O.  Infectious Diseases Fellow  [phone]    Physician Attestation   I personally examined and evaluated this patient.  I discussed the patient with Dr. Dotson and agree with the assessment and plan of care as documented in this edited note. I personally reviewed vital signs, medications, labs and imaging. Patient was febrile over the weekend on ertapenem. Please obtain CT abdomen to r/o abscess in the setting of recent colitis, ongoing fevers and new neutrophil recovery (patient can now form an abscess).   YADY AMIN MD  Infectious Diseases Attending  Pager: [phone]        Interval History:   Continues to be intubated and sedated. Febrile this weekend with T max of 101. No increase in oxygen requirements. Right pleural drain placed this weekend for pleural effusion      Transplants:  3/4/2017 (Liver), Postoperative day:  128.  Coordinator Abril Doty    Review of Systems:  unable to obtain 2/2 clinical condition          Current Medications & Allergies:       pantoprazole  40 mg Oral or Feeding Tube Daily     ertapenem (INVanz) IV  1 g Intravenous Q24H     lipids  250 mL Intravenous Q24H     fludrocortisone  0.1 mg Oral Daily     sodium chloride (PF)  3 mL Intracatheter Q8H     tacrolimus  3 mg Oral QPM     tacrolimus  4 mg Oral QAM       Infusions/Drips:    D5W 50 mL/hr at 07/10/17 1102     parenteral nutrition - ADULT compounded formula       parenteral nutrition - ADULT compounded formula 65 mL/hr at 07/09/17 2029     IV fluid  REPLACEMENT ONLY       insulin (regular) 8 Units/hr (07/10/17 1300)     IV fluid REPLACEMENT ONLY         Allergies   Allergen Reactions     Erythromycin GI Disturbance     Vioxx      Nausea, vomiting            Physical Exam:   Vitals were reviewed.  All vitals stable.  Patient Vitals for the past 24 hrs:   BP Temp Temp src Resp SpO2 Weight   07/10/17 1300 - - - - 99 % -   07/10/17 1200 - 99.4  F (37.4  C) Axillary 25 98 % -   07/10/17 1100 - - - - 98 % -   07/10/17 1000 - - - - 96 % -   07/10/17 0900 - - - - 97 % -   07/10/17 0800 - 99.5  F (37.5  C) Axillary 27 97 % -   07/10/17 0700 - - - - 97 % -   07/10/17 0600 157/76 - - - - 96.3 kg (212 lb 4.9 oz)   07/10/17 0500 - - - - 97 % -   07/10/17 0400 - 99.6  F (37.6  C) Axillary 30 97 % -   07/10/17 0300 - - - - 97 % -   07/10/17 0200 - - - - 98 % -   07/10/17 0100 - - - - 96 % -   07/10/17 0000 - 100  F (37.8  C) Axillary 30 97 % -   07/09/17 2300 - - - - 98 % -   07/09/17 2200 - - - - 98 % -   07/09/17 2100 - - - - 97 % -   07/09/17 2000 - 99.1  F (37.3  C) Axillary 28 98 % -   07/09/17 1900 - - - - 100 % -   07/09/17 1700 - - - - 95 % -   07/09/17 1600 - 99.5  F (37.5  C) Axillary - 97 % -   07/09/17 1500 - - - - 96 % -   07/09/17 1400 - - - - 92 % -     Ranges for vital signs:  Temp:  [99.1  F (37.3  C)-100  F (37.8  C)] 99.4  F (37.4  C)  Heart Rate:  [68-97] 90  Resp:  [25-30] 25  BP: (157)/(76) 157/76  MAP:  [54 mmHg-126 mmHg] 88 mmHg  Arterial Line BP: ()/(40-89) 130/64  FiO2 (%):  [30 %] 30 %  SpO2:  [92 %-100 %] 99 %  Vitals:    07/08/17 1000 07/09/17 0400 07/10/17 0600   Weight: 101.4 kg (223 lb 8.7 oz) 99.9 kg (220 lb 3.8 oz) 96.3 kg (212 lb 4.9 oz)       Physical Examination:  GENERAL:  well-developed, well-nourished,in ICU bed, intubated  HEAD:  Head is normocephalic, atraumatic   ENT:  ETT in place  LUNGS: coarse throughout  CARDIOVASCULAR:  Regular rate and rhythm with no murmurs,   ABDOMEN:  Quiet bowel sounds, surgical incision dressed.    SKIN:  No acute rashes.  Line in place without any surrounding erythema or exudate.  NEUROLOGIC:  Sedated         Laboratory Data:     No results found for: ACD4    Inflammatory Markers    Recent Labs   Lab Test  07/05/17   1810  03/05/17   0358  11/23/16   0813  11/16/16   0706  11/15/16   1039  11/07/16   0759   08/15/11   1151  04/11/11   1209  01/03/11   1246  02/15/10   1232   SED   --    --   45*   --    --    --    --   11  14  17*  25*   CRP  354.0  20.0*  58.0*  59.1*  49.8*  66.0*   < >  11.1*  9.0*  11.7*  17.5*    < > = values in this interval not displayed.       Immune Globulin Studies     Recent Labs   Lab Test  08/15/11   1243   IGG  1160   IGG1  734   IGG2  294   IGG3  7*   IGG4  59       Metabolic Studies       Recent Labs   Lab Test  07/10/17   0823  07/10/17   0340  07/09/17   1614   07/09/17   0321   07/06/17   0316   02/22/17   0643   NA   --   149*  147*   --   147*   < >  143   < >  133   POTASSIUM  4.0  3.3*  3.6   < >  3.3*   < >  5.0   < >  4.8   CHLORIDE   --   116*  117*   --   118*   < >  116*   < >  100   CO2   --   21  21   --   20   < >  18*   < >  22   ANIONGAP   --   12  9   --   9   < >  9   < >  12   BUN   --   125*  127*   --   114*   < >  62*   < >  46*   CR   --   2.62*  2.62*   --   2.51*   < >  2.75*   < >  1.40*   GFRESTIMATED   --   26*  26*   --   27*   < >  24*   < >  53*   GLC   --   171*  148*   --   170*   < >  139*   < >  147*   A1C   --    --    --    --    --    --   Canceled, Test credited   Below Assay Range  NOTIFIED LEONARD ONEILL AT 0538 ON 7/6/17 BY CHRISSY     --    --    JACOB   --   8.8  9.0   --   9.0   < >  6.9*   < >  9.0   PHOS   --   3.5  3.1   --   2.2*   < >  5.5*   < >   --    MAG   --   2.4*  2.5*   --   2.4*   < >  2.1   < >   --    LACT   --   0.9   --    --   0.9   < >  1.5   < >  1.9   PCAL   --    --    --    --    --    --    --    --   1.76    < > = values in this interval not displayed.       Hepatic Studies    Recent Labs   Lab Test   07/10/17   0340  07/08/17   0330  07/07/17   0309   07/05/17   1810   06/28/12   1234   BILITOTAL  0.7  0.7  0.6   < >   --    < >  1.7*   BILIDELTA   --    --    --    --    --    --   0.5*   BILICONJ   --    --    --    --    --    --   0.0   DBIL   --   0.3*  0.4*   < >   --    < >   --    ALKPHOS  116  48  35*   < >   --    < >  146   PROTTOTAL  5.0*  5.0*  4.7*   < >   --    < >  6.3*   ALBUMIN  1.8*  2.0*  2.2*   < >   --    < >  3.5*   AST  67*  29  25   < >   --    < >  41   ALT  34  15  16   < >   --    < >  41   LDH   --    --    --    --   289*   --    --     < > = values in this interval not displayed.       Pancreatitis testing    Recent Labs   Lab Test  07/10/17   0340   03/05/17   0358  03/03/17   1458  02/21/17   1520  12/09/16   1159   09/30/10   1734   AMYLASE   --    --   53  70   --    --    --   82   LIPASE   --    --   216   --   326  161   --   138   TRIG  177*   < >   --    --    --    --    < >   --     < > = values in this interval not displayed.       Gout Labs    No lab results found.    Hematology Studies      Recent Labs   Lab Test  07/10/17   0340  07/09/17   1614  07/09/17   0321  07/08/17   1649  07/08/17   0330  07/07/17   2309   WBC  10.5  9.9  7.0  6.3  4.0  3.0*   ANEU  9.4*  8.5*  6.3  5.3  3.0  1.8   ALYM  0.3*  0.5*  0.1*  0.3*  0.5*  0.4*   ZINA  0.2  0.2  0.4  0.2  0.3  0.5   AEOS  0.0  0.2  0.1  0.2  0.1  0.0   HGB  8.5*  8.2*  7.8*  8.0*  7.9*  7.5*   HCT  26.5*  25.3*  24.2*  24.3*  23.9*  23.5*   PLT  71*  60*  56*  46*  35*  34*       Clotting Studies    Recent Labs   Lab Test  07/10/17   0340  07/08/17   0902  07/06/17   0316  07/06/17   0011   INR  1.17*  1.22*  1.80*  1.91*   PTT   --   38*  47*  48*       Iron Testing    Recent Labs   Lab Test  07/10/17   0340   07/06/17   1228   07/05/17   1810   02/08/16   1144   IRON   --    --    --    --    --    --   93   FEB   --    --    --    --    --    --   230*   IRONSAT   --    --    --    --    --    --   41   MCV  88    < >  87   < >  86   < >  92   HAPT   --    --    --    --   162   < >   --    RETP   --    --   1.6   < >   --    < >   --    RETICABSCT   --    --   39.4   < >   --    < >   --     < > = values in this interval not displayed.       Markers    Alpha Fetoprotein   Date Value Ref Range Status   02/03/2017  0 - 8 ug/L Final    <1.5  Assay Method:  Chemiluminescence using Siemens Centaur XP         Autoimmune Testing    Recent Labs   Lab Test  01/03/11   1246  02/15/10   1232   JESUS MANUEL  <1.0   --    RHF   --   10   ENASSA   --   0   ENASSB   --   0       Arterial Blood Gas Testing    Recent Labs   Lab Test  07/08/17   0902  07/06/17   2334  07/06/17   0015  07/05/17   2026  07/05/17   1810   PH  7.32*  7.26*  7.27*  7.27*  7.28*   PCO2  35  37  35  35  37   PO2  96  85  109*  155*  188*   HCO3  18*  17*  16*  16*  17*   O2PER  30  30.0  30.0  40  50.0        Thyroid Studies     Recent Labs   Lab Test  02/08/16   1144  04/11/11   1209  02/15/10   1232  07/31/09   1254   TSH  3.35  2.77  2.53  1.95   T4   --   1.23  0.90   --        Urine Studies     Recent Labs   Lab Test  07/06/17   1441  06/06/17   1605  03/24/17   1315  03/16/17   1010  03/03/17   1405   URINEPH  5.0  5.0  5.5  5.0  5.0   NITRITE  Negative  Negative  Negative  Negative  Negative   LEUKEST  Trace*  Negative  Negative  Negative  Negative   WBCU  9*  1  1  5*  0       CSF testing     Recent Labs   Lab Test  06/11/09   0225   CWBC  1   CRBC  2   CGLU  104*   CTP  54       Medication levels    Recent Labs   Lab Test  07/10/17   0636   07/06/17   0316   VANCOMYCIN   --    --   22.1   TACROL  4.9*   < >  6.3    < > = values in this interval not displayed.       Microbiology:  Beta D Glucan levels (Fungitell assay)    No lab results found.    Last Culture results with specimen source  Culture Micro   Date Value Ref Range Status   07/05/2017 No growth after 5 days  Final   07/05/2017 No growth after 5 days  Final   07/05/2017 (A)  Final    Heavy growth  Enterococcus faecium (VRE)  Susceptibility testing requested by Tip Anna on VRE for   Tigecycline.7/7/17.TV.     07/05/2017 Culture negative after 5 days  Final   07/05/2017   Final    Canceled, Test credited  Incorrectly ordered by PCU/Clinic  See accession [id number] for result.     07/05/2017 (A)  Final    On day 3, isolated in broth only: Staphylococcus capitis  Critical Value/Significant Value, preliminary result only, called to and read   back by CLARA WRIGHT RN 4B  7.8.17 @ 1050   Culture in progress     07/05/2017 No growth  Final   07/04/2017 Culture negative monitoring continues  Final   07/04/2017 No growth  Final   07/04/2017 Culture negative after 6 days  Final    Specimen Description   Date Value Ref Range Status   07/05/2017 Blood Brown port  Final   07/05/2017 Blood Left Brachial Arterial blood  Final   07/05/2017 Bronchoalveolar Lavage Lower Lobes bilateral  Final   07/05/2017 Bronchoalveolar Lavage Lower lobes bilateral  Final   07/05/2017 Bronchoalveolar Lavage Lower lobes bilateral  Final   07/05/2017 Bronchoalveolar Lavage Lower lobes bilateral  Final   07/05/2017 Other  Final   07/05/2017 Abdominal Ascites Fluid  Final   07/05/2017 Abdominal Ascites Fluid  Final   07/05/2017 Abdominal Ascites Fluid  Final        Last check of C difficile  C Diff Toxin B PCR   Date Value Ref Range Status   10/27/2016  NEG Final    Negative  Negative: Clostridium difficile target DNA sequences NOT detected, presumed   negative for Clostridium difficile toxin B or the number of bacteria present   may be below the limit of detection for the test.   FDA approved assay performed using [employer] GeneXpert real-time PCR.   A negative result does not exclude actual disease due to Clostridium difficile   and may be due to improper collection, handling and storage of the specimen or   the number of organisms in the specimen is below the detection limit of the   assay.         Virology:  Log IU/mL of CMVQNT   Date  Value Ref Range Status   07/03/2017 <2.1 <2.1 [Log_IU]/mL Final   06/26/2017 Not Calculated <2.1 [Log_IU]/mL Final       No results found for: H6RES    No results found for: EBQTC  No results found for: EBRES    BK viral loads No lab results found.    Parvovirus Testing  No lab results found.    Invalid input(s): PRVRES    Adenovirus Testing  No lab results found.    Invalid input(s): ADENAB, ADAG, ADENOVIRUS, ADQT    Imaging:  Recent Results (from the past 48 hour(s))   CT Chest w/o Contrast    Narrative    EXAMINATION: Chest CT  7/8/2017     CLINICAL HISTORY: Pleural effusions, pulmonary infiltrates.h/o  cirrhosis s/p OLT 3/2017 who is admitted with neutropenia and colitis,  s/p exlap    COMPARISON: CT abdomen pelvis dated 7/4/2017, chest radiograph dated  7/8/2017.    TECHNIQUE: CT imaging obtained through the chest without intravenous  contrast. Coronal and axial MIP reformatted images obtained.    FINDINGS:  Endotracheal tube is present.    Heart size is enlarged. No significant pericardial effusion. Prominent  pulmonary artery measures up to 4.1 cm. No thoracic lymphadenopathy.  Perihilar groundglass attenuation suspicious of pulmonary edema. Large  pleural effusion on right and small pleural effusion on left overlying  on the bilateral lower lobe atelectasis.     Bones and soft tissues: Degenerative changes of the thoracic and  lumbar spine. Wedge shaped deformity of T8. No suspicious bone  findings.     Partially imaged upper abdomen: Limited. Multiple surgical clips and  enteric tube present.      Impression    IMPRESSION: Large pleural effusion on right and small pleural effusion  on left overlying bilateral lower lobe atelectasis. Bibasilar  infection or aspiration cannot excluded.  1.  Cardiomegaly. Prominent pulmonary artery measures up to 4.1 cm.  2.  Perihilar groundglass attenuation suspicious of pulmonary edema.  Anasarca.    I have personally reviewed the examination and initial interpretation  and  I agree with the findings.    BLANCA HUYNH MD   XR Chest Port 1 View    Narrative    Examination: XR CHEST PORT 1 VW, 7/9/2017 2:58 AM    Comparison: Chest radiograph and CT 7/8/2017    History: intubated    Findings: Endotracheal tube tip at the level of the mid thoracic  trachea. Right IJ central venous catheter tip at the level of the low  SVC. Enteric tube extends towards the left upper quadrant, tip not  included in the field-of-view. Cardiac silhouette is obscured. Large  right-sided pleural effusion, with hazy opacification of the right  hemithorax at least partially related to layering effusion. Left  basilar opacities are grossly unchanged. The left costophrenic angle  is not included in the field-of-view. No pneumothorax.      Impression    Impression: Large right-sided pleural effusion stable. Bilateral  opacities are grossly similar accounting for differences in patient  positioning, could represent edema or infection.    I have personally reviewed the examination and initial interpretation  and I agree with the findings.    DALLIN RENAE MD   XR Chest Port 1 View    Narrative    Exam: Chest x-ray  7/9/2017 5:13 PM      History: Right pleural drain    Comparison: Radiograph same date    Findings: Stable positioning of endotracheal tube, right-sided central  catheter, and enteric tube. New right-sided chest tube. Significantly  decreased right-sided pleural effusion and right pulmonary opacities.  Unchanged mild left basilar opacities. No pneumothorax. Cardiac  silhouette is within normal limits. Trachea is midline. Surgical clips  project over the upper abdomen.      Impression    Impression:   1. Significantly decreased right pleural effusion and improved right  pulmonary opacities following placement of right-sided chest tube.  2. Unchanged mild left basilar opacities.    I have personally reviewed the examination and initial interpretation  and I agree with the findings.    BLANCA  MD AMINA   XR Chest Port 1 View    Narrative    EXAM: XR CHEST PORT 1 VW  7/10/2017 1:40 AM     HISTORY:  intubated       COMPARISON: 7/9/2017    FINDINGS: ET tube tip over mid thoracic trachea. Right IJ central line  tip terminates over the mid SVC. Right basilar chest tube is in place  and unchanged. Gastric tube tip projects below the diaphragm with  limited visualization of the tip and sidehole.    Stable cardiac silhouette. Stable right pleural effusion. Persistent  perihilar and bibasilar opacities with dense retrocardiac opacity. Low  lung volumes. No pneumothorax.       Impression    IMPRESSION: Stable perihilar/bibasilar opacities and right pleural  effusion.    I have personally reviewed the examination and initial interpretation  and I agree with the findings.    DIAZ NORMAN MD

## 2017-07-10 NOTE — PROCEDURES
Procedure Report  7/9/2017    Pre-operative Diagnosis: Right pleural effusion   Post-operative Diagnosis: Same   Procedure: Ultrasound guided right pleural drain placement   Staff: Alexei   Resident: Natanael   EBL: 0  Replacement: None   Anesthesia: Lidocaine 1%, 3 ml   Findings: Simple effusion   Specimen: None   Complication: None immediate   Tubes/Drains: Under sterile conditions and ultrasound guidance, pleura cavity was accessed with a needle, wire introduced, tract was dilated, and a 14F pigtail non-locking cathter was placed. Immediate return of clear simple effusion fluid, nil bleeding.   Disposition: Cont SICU    [facility] 1718

## 2017-07-10 NOTE — PROGRESS NOTES
SICU Progress Note  Primary Service: Liver Transplant  07/10/2017      Summary:  51 year old man s/p liver transplant March 2017 after CASTAÑEDA admitted on 7/4 for abdominal pain and fever found to have neutropenic colitis  7/4/2017 Exploratory laparotomy due to concern for perforation, viable hepatic flexure   7/5/2017 Abdominal washout and closure  7/5/2017  Left axillary arterial line, bronchoscopy and BAL   7/9/2017  Right pleural pigtail catheter      Events/Changes:  - Continue Diuresis today, Lasix 40 mg x 1  - Transition off Seroquel and decrease oxycodone  - Continue trials of pressure support  - Discontinue Ertapenem if okay with transplant ID  - Start Trophic feeds    Subjective:  No overnight events. Tolerating pressure support trials.    On Exam:  /76  Pulse 101  Temp 99.5  F (37.5  C) (Axillary)  Resp 27  Ht 1.829 m (6')  Wt 96.3 kg (212 lb 4.9 oz)  SpO2 98%  BMI 28.79 kg/m2     Ventilation Mode: CMV/AC  FiO2 (%): 30 %  Rate Set (breaths/minute): 22 breaths/min  Tidal Volume Set (mL): 500 mL  PEEP (cm H2O): 5 cmH2O  Pressure Support (cm H2O): 7 cmH2O  Oxygen Concentration (%): 30 %  Resp: 27    I/O last 3 completed shifts:  In: 2686.76 [I.V.:995.58; NG/GT:160]  Out: 6450 [Urine:5000; Emesis/NG output:300; Chest Tube:1150]    Drips:      Constitutional: Intubated and sedate   Cardiac:  Sinus tachycardia, no murmurs noted   Respiratory:  Clear sounds   Abdomen:  Incision c/d/i, staples in place, no drainage   Genitourinary: Crowe in place, scrotal edema   Extremities: Pitting edema, right index fingertip remains dusky, toe tips are much improved  Neurologic: Sedated at time of exam       Metabolic Panel    Recent Labs  Lab 07/10/17  0823 07/10/17  0340 07/09/17  1614 07/09/17  1334  07/09/17  0321 07/08/17  1649  07/08/17  0330  07/07/17  1146   NA  --  149* 147*  --   --  147* 146*  --  146*  < > 144   POTASSIUM 4.0 3.3* 3.6 3.3*  < > 3.3* 3.4  < > 3.7  < > 3.9   CHLORIDE  --  116* 117*  --    --  118* 118*  --  117*  < > 116*   CO2  --  21 21  --   --  20 19*  --  19*  < > 19*   BUN  --  125* 127*  --   --  114* 102*  --  90*  < > 83*   CR  --  2.62* 2.62*  --   --  2.51* 2.48*  --  2.56*  < > 2.61*   GLC  --  171* 148*  --   --  170* 183*  --  137*  < > 135*   JACOB  --  8.8 9.0  --   --  9.0 8.7  --  8.4*  < > 8.0*   MAG  --  2.4* 2.5*  --   --  2.4* 2.4*  < > 2.2  --   --    PHOS  --  3.5 3.1  --   --  2.2* 2.3*  < > 2.2*  --   --    LACT  --  0.9  --   --   --  0.9  --   --  1.4  --  1.2   < > = values in this interval not displayed.    LFT    Recent Labs  Lab 07/10/17  0340 07/08/17  0902 07/08/17  0330 07/07/17  0309 07/06/17  0316 07/06/17  0011  07/05/17  2026   AST 67*  --  29 25  --   --   --  24   ALT 34  --  15 16  --   --   --  16   ALKPHOS 116  --  48 35*  --   --   --  29*   BILITOTAL 0.7  --  0.7 0.6  --   --   --  0.7   ALBUMIN 1.8*  --  2.0* 2.2*  --   --   --  2.4*   INR 1.17* 1.22*  --   --  1.80* 1.91*  < >  --    < > = values in this interval not displayed.    Coags    Recent Labs  Lab 07/10/17  0340 07/08/17  0902 07/06/17  0316 07/06/17  0011 07/05/17  2053   INR 1.17* 1.22* 1.80* 1.91* 1.92*   PTT  --  38* 47* 48* 52*   FIBR  --  719* 538* 527* 470*       CBC    Recent Labs  Lab 07/10/17  0340 07/09/17  1614 07/09/17  0321 07/08/17  1649   WBC 10.5 9.9 7.0 6.3   HGB 8.5* 8.2* 7.8* 8.0*   PLT 71* 60* 56* 46*       ABG    Recent Labs  Lab 07/08/17  0902 07/06/17  2334 07/06/17  0015 07/05/17 2026   PH 7.32* 7.26* 7.27* 7.27*   PCO2 35 37 35 35   PO2 96 85 109* 155*   HCO3 18* 17* 16* 16*   O2PER 30 30.0 30.0 40       VBG    Recent Labs  Lab 07/08/17  0902 07/06/17  2334 07/06/17  0015 07/05/17 2026   O2PER 30 30.0 30.0 40     Imaging:   Recent Results (from the past 24 hour(s))   XR Chest Port 1 View    Narrative    Exam: Chest x-ray  7/9/2017 5:13 PM      History: Right pleural drain    Comparison: Radiograph same date    Findings: Stable positioning of endotracheal tube,  right-sided central  catheter, and enteric tube. New right-sided chest tube. Significantly  decreased right-sided pleural effusion and right pulmonary opacities.  Unchanged mild left basilar opacities. No pneumothorax. Cardiac  silhouette is within normal limits. Trachea is midline. Surgical clips  project over the upper abdomen.      Impression    Impression:   1. Significantly decreased right pleural effusion and improved right  pulmonary opacities following placement of right-sided chest tube.  2. Unchanged mild left basilar opacities.    I have personally reviewed the examination and initial interpretation  and I agree with the findings.    BLANCA HUYNH MD   XR Chest Port 1 View    Narrative    EXAM: XR CHEST PORT 1 VW  7/10/2017 1:40 AM     HISTORY:  intubated       COMPARISON: 7/9/2017    FINDINGS: ET tube tip over mid thoracic trachea. Right IJ central line  tip terminates over the mid SVC. Right basilar chest tube is in place  and unchanged. Gastric tube tip projects below the diaphragm with  limited visualization of the tip and sidehole.    Stable cardiac silhouette. Stable right pleural effusion. Persistent  perihilar and bibasilar opacities with dense retrocardiac opacity. Low  lung volumes. No pneumothorax.       Impression    IMPRESSION: Stable perihilar/bibasilar opacities and right pleural  effusion.    I have personally reviewed the examination and initial interpretation  and I agree with the findings.    DIAZ NORMAN MD       Cultures:   7/5  BAL; VRE   7/5 Ascites culture: GPC       Assessment/Plan:    Fluid/Electrolytes/Nutirtion:  -Protein calorie malnutrition  TPN+Lipids. Start trophic feeds.    -Hypervolemia  Electrolyte replacement protocol   TKO  Nephrology consulted and following  Continue with diuresis with goal of negative 1-2 L today     Neuro:    -Hyperactive delirium   Wean Seroquel  - Oxycodone PRN    Psychiatric:  -See neuro    Cardiac:  -No issues  Septic chock resolved      Pulmonary:    -Pulmonary edema  TKO all fluids    -Pleural effusion  Right pigtail 7/9  Diuresis as above     GI:    -Neutropenic colitis  Abx coverage with Ertapenem, should cover abdominal ascites GPC, will discuss stop with transplant ID  -Liver Transplant   Graft ultrasound normal       Renal:  -Hypervolemia  Diuresis as above     -EJ   Resolving   Nephrology following    Hematologic:  -Pancytopenia   Suspect related to mycophenolate, stopped on admission   All indices improved, Neupogen stopped 7/8   Hematology following    CBC's with diff     Endocrine:   -Hx of hyperkalemia   On PTA dose of Florinef    -T2DM  Insulin ggt    Immunologic:  -s/p Liver transplant   On tacrolimus, daily levels   Stopped mycophenolate on admission     ID:  -Typhlitis  Ertapenem, Holding all other antimicrobials   Transplant ID following cultures   Holding Valcyte and bactrim in the setting of pancytopenia     -VRE BAL culture  Likely colonization since his pulmonary function is improving consistently   No need to treat    MKS:  -PT/OT consults     Prophylaxis:  -Holding DVT chemoprophylaxis per transplant   -PO protonix     Lines:  -Left axillary arterial line 7/5  -Right IJ CVC 7/4  -Right Pigtail 7/9    Disposition:   -SICU     Discussed with SICU fellow and will be discussed with surgical ICU staff, Dr. Freeman    - Walter Topete, MS4  - Nitish Malone, APRN, CNP

## 2017-07-10 NOTE — PROGRESS NOTES
Transplant Surgery  Inpatient Daily Progress Note  07/10/2017    Assessment & Plan: Camacho Bhagat is a 53 yo M with a history of ESLD due to CASTAÑEDA s/p DD OLT 3/4/17 admitted 7/4/17 from [facility] ED for evaluation and management of sepsis secondary to colitis, taken to OR for initial exploratory laparotomy with findings of typhlitis in the right colon, wound left open with wound vac in place for reexploration and interval closure on 7/5/2017.     Graft Function: Good function. US liver normal doppler. LFTs WNL, slight increase. Repeat .   Immunosuppression Management:   CellCept 250 mg BID. Held since 6/27/17 due to neutropenia. Continue to hold.   Tac goal level 5-8. 7/7 level 4.9 (12 hour trough). Continue Prograf 4 mg AM, 3 mg PM dosing. Repeat tac level tomorrow for 12 hour trough.   Cardiorespiratory: Intubated for ex lap, has since required pressor support, norepinephrine titrated off yesterday. Moderate right sided pleural effusion. Chest tube placed 7/9, ~ 1L output yesterday.  Vent management per SICU team. Patient tolerating PS this AM.   Hematology: Pancytopenia, acute on chronic, secondary to medications. MMF and bactrim held (since 6/27). Valcyte held on admission. Appreciate hematology recs,   Neupogen 480 mcg given on 7/4 to 7/7,  CMV seronegative and donor seropositive. 7/3 CMV PCR < 137. 7/10 CMV PCR in process.   GI: CT from [facility] demonstrating right sided colonic inflammation, AXR on admission with dilated loop of bowel in central abdomen, felt likely to sigmoid. Initial impression consistent with Typhlitis however given worsening abdominal pain on examination, hypotension and increasing lactate levels, elected to proceed with exploratory laparotomy. Findings on reexploration demonstrating persistent inflammation of the right colon without evidence of ischemia. Lactic acid normalized.  Start tropic TF.   Fluid/Electrolytes/Renal: MIVF: per SICU.  EJ, secondary to hypoperfusion, sepsis. Cr stable  today. UO increasing. Nephrology consulting. Hyperkalemia, PTA on florinef. K today 4.0. Lasix 60 IV TID. Diuril 1gm x 1 on 7/9.  Endocrine: DM II, on insulin gtt.   Infectious disease: Tmax 101, WBC 10.5. Started on Zosyn, Vancomycin, Flagyl, Micafungin on admission (7/4/2017). 7/5 Blood cultures, no growth thus far. Abdominal fluid culture collected intraoperatively, gram stain with no organisms, fungal and bacterial culture, GPCs. Bronchoalveolar lavage growing VRE, colonization. Tx ID consulted recommending continue Ertapenem and evaluate for abscess given fever yesterday. Will continue ertapenem. Hold on CT scan for now.   Neuro: Delirium. Oxycodone PRN pain.   Access: R IJ, R radial arterial line  Prophylaxis: PPI, DVT-SCD  Disposition: SICU    Medical Decision Making: Medium  Admit 11806 (moderate level decision making)    PILLO/Fellow/Resident Provider: Ada Driver PA-C    Faculty: Chaparro Anderson M.D.      __________________________________________________________________  Transplant History:.  3/4/2017 DD OLT with Dr. Tran (Liver), Postoperative day: 128     Interval History: History is obtained from EMR and nursing staff.  Overnight events: PS trial this morning. BM, smear, no blood. Oxycodone.     ROS:   A 10-point review of systems was negative except as noted above.    Meds:    lidocaine (viscous)  5 mL Topical Once     lidocaine (viscous)  5 mL Topical Once     pantoprazole  40 mg Oral or Feeding Tube Daily     ertapenem (INVanz) IV  1 g Intravenous Q24H     lipids  250 mL Intravenous Q24H     fludrocortisone  0.1 mg Oral Daily     sodium chloride (PF)  3 mL Intracatheter Q8H     tacrolimus  3 mg Oral QPM     tacrolimus  4 mg Oral QAM       Physical Exam:     Admit Weight: 94.2 kg (207 lb 11.2 oz) (SCALE 2)    Current vitals:   /76  Pulse 101  Temp 99.4  F (37.4  C) (Axillary)  Resp 25  Ht 1.829 m (6')  Wt 96.3 kg (212 lb 4.9 oz)  SpO2 99%  BMI 28.79 kg/m2         Vital sign  ranges:    Temp:  [99.1  F (37.3  C)-100  F (37.8  C)] 99.4  F (37.4  C)  Heart Rate:  [68-97] 90  Resp:  [25-30] 25  BP: (157)/(76) 157/76  MAP:  [54 mmHg-126 mmHg] 88 mmHg  Arterial Line BP: ()/(40-89) 130/64  FiO2 (%):  [30 %] 30 %  SpO2:  [95 %-100 %] 99 %  Patient Vitals for the past 24 hrs:   BP Temp Temp src Heart Rate Resp SpO2 Weight   07/10/17 1300 - - - 90 - 99 % -   07/10/17 1200 - 99.4  F (37.4  C) Axillary 92 25 98 % -   07/10/17 1100 - - - 90 - 98 % -   07/10/17 1000 - - - 92 - 96 % -   07/10/17 0900 - - - 93 - 97 % -   07/10/17 0801 - - - 91 - - -   07/10/17 0800 - 99.5  F (37.5  C) Axillary 91 27 97 % -   07/10/17 0700 - - - 94 - 97 % -   07/10/17 0600 157/76 - - 95 - - 96.3 kg (212 lb 4.9 oz)   07/10/17 0500 - - - 90 - 97 % -   07/10/17 0400 - 99.6  F (37.6  C) Axillary 86 30 97 % -   07/10/17 0300 - - - 89 - 97 % -   07/10/17 0200 - - - 80 - 98 % -   07/10/17 0100 - - - 82 - 96 % -   07/10/17 0000 - 100  F (37.8  C) Axillary 90 30 97 % -   07/09/17 2300 - - - 97 - 98 % -   07/09/17 2200 - - - 86 - 98 % -   07/09/17 2100 - - - 77 - 97 % -   07/09/17 2000 - 99.1  F (37.3  C) Axillary 76 28 98 % -   07/09/17 1900 - - - 73 - 100 % -   07/09/17 1700 - - - 68 - 95 % -   07/09/17 1600 - 99.5  F (37.5  C) Axillary 79 - 97 % -   07/09/17 1500 - - - 82 - 96 % -     General Appearance: sedated   Skin: normal, warm, dry  Heart: ST, normal S1 and S2. B/l feet warm, b/l toes cool, dusky  Lungs: Vented  Abdomen: The abdomen is soft, midline incision is c/d/i and covered. Incision from prior Liver transplant is noted and well healed.   : vazquez is present, yellow urine in bag.   Extremities:BLE trace edema.  Neurologic: sedated.     Data:   CMP    Recent Labs  Lab 07/10/17  0823 07/10/17  0340 07/09/17  1614  07/08/17  0330  07/06/17  0741  07/05/17  0859   NA  --  149* 147*  < > 146*  < >  --   < > 137   POTASSIUM 4.0 3.3* 3.6  < > 3.7  < > 4.9  < > 6.0*   CHLORIDE  --  116* 117*  < > 117*  < >  --   < >   --    CO2  --  21 21  < > 19*  < >  --   < >  --    GLC  --  171* 148*  < > 137*  < >  --   < > 316*   BUN  --  125* 127*  < > 90*  < >  --   < >  --    CR  --  2.62* 2.62*  < > 2.56*  < >  --   < >  --    GFRESTIMATED  --  26* 26*  < > 26*  < >  --   < >  --    GFRESTBLACK  --  31* 31*  < > 32*  < >  --   < >  --    JACOB  --  8.8 9.0  < > 8.4*  < >  --   < >  --    ICAW  --   --   --   --   --   --  4.2*  --  4.4   MAG  --  2.4* 2.5*  < > 2.2  < >  --   < >  --    PHOS  --  3.5 3.1  < > 2.2*  < >  --   < >  --    ALBUMIN  --  1.8*  --   --  2.0*  < >  --   < >  --    BILITOTAL  --  0.7  --   --  0.7  < >  --   < >  --    ALKPHOS  --  116  --   --  48  < >  --   < >  --    AST  --  67*  --   --  29  < >  --   < >  --    ALT  --  34  --   --  15  < >  --   < >  --    < > = values in this interval not displayed.  CBC    Recent Labs  Lab 07/10/17  0340 07/09/17  1614  07/06/17  0316   HGB 8.5* 8.2*  < > 7.1*   WBC 10.5 9.9  < > 0.8*   PLT 71* 60*  < > 27*   A1C  --   --   --  Canceled, Test credited Below Assay RangeNOTIFIED LEONARD ONEILL AT 0538 ON 7/6/17 BY [facility]   < > = values in this interval not displayed.  COAGS    Recent Labs  Lab 07/10/17  0340 07/08/17  0902 07/06/17  0316   INR 1.17* 1.22* 1.80*   PTT  --  38* 47*      Urinalysis  Recent Labs   Lab Test  07/06/17   1441  06/14/17   1508  06/06/17   1605   04/11/16   1345   COLOR  Yellow   --   Yellow   < >  Yellow   APPEARANCE  Cloudy   --   Slightly Cloudy   < >  Clear   URINEGLC  Negative   --   50*   < >  30*   URINEBILI  Negative   --   Negative   < >  Negative   URINEKETONE  Negative   --   Negative   < >  Negative   SG  1.014   --   1.015   < >  1.016   UBLD  Moderate*   --   Negative   < >  Small*   URINEPH  5.0   --   5.0   < >  5.0   PROTEIN  30*   --   100*   < >  30*   NITRITE  Negative   --   Negative   < >  Negative   LEUKEST  Trace*   --   Negative   < >  Negative   RBCU  >182*   --   2   < >  1   WBCU  9*   --   1   < >  1   UTPG   --   1.55*    --    --   0.41*    < > = values in this interval not displayed.       Cultures:   Blood Cultures x2 7/4/2017 Pending     Abdominal Fluid Culture 7/4/2017: Pending     Abdominal Fluid Culture 7/5/17: Pending    Bronchoalveolar Culture 7/5/17: VRE.     CT scan:    7/4/2017 CONCLUSION:   1. Right colonic wall thickening suggesting colitis. Follow-up is necessary to confirm resolution in order to exclude colonic mass.  2. Transverse colon is not well distended reducing evaluation for wall thickening.  3. Small amount of ascites.  4. Splenomegaly.  5. Prominent portal and splenic veins. If confirmation of vascular patency is indicated consider follow-up ultrasound of the liver with Doppler.  6. Small right pleural effusion.  7. Stranding in the subcutaneous fat of the ventral pelvis.     Abdominal XR 7/4/2017:   1. No evidence of pneumoperitoneum.  2. Dilated loop of bowel in the central abdomen, likely sigmoid.    XR Chest Portable 1 View 7/4/17:  1. Endotracheal tube tip projects 5.3 cm from the chacorta.  2. Increased bilateral pleural effusions, right greater than left.  3. Unchanged bibasilar patchy opacities.Attestation:    The patient has been seen and evaluated by me.   Vital signs, labs, medications and orders were reviewed.   When obtained, diagnostic images were reviewed by me and interpreted as above.    The care plan was discussed with the multidisciplinary team and I agree with the findings and plan in this note, with any differences recorded in blue.    Immunosuppressive medication management was reviewed and adjusted as reflected in the note and orders.     .

## 2017-07-10 NOTE — PLAN OF CARE
Problem: Goal Outcome Summary  Goal: Goal Outcome Summary  Outcome: No Change  More alert, opens eyes spontaneous, moves all extremities- does not follow commands. Oxycodone given for signs of pain with improvement. Hemodynamically stable, low grade fevers. Pressure supported about 5 hours and tolerated well, back on vent settings. Moderate amount thick secretions, lungs coarse/diminished. Chest tube with minimal serous output. TPN 65ml/hr. Lasix dosing reduced per renal, good diuresis, CVP 4. Electrolytes replaces as needed. D5W started for elevated Na, 149. Abd melody CDI.   Care transferred to Giovana RN and pt moved to [id number].

## 2017-07-10 NOTE — PROGRESS NOTES
Nephrology Progress Note  07/10/2017       Camacho Bhagat is a 51 year old man with h/o cirrhosis s/p OLT 3/2017 who is admitted with neutropenia and colitis, s/p exlap, no bowel removed but did have large insensible losses and shock requiring pressor support, EJ on CKD due to sepsis but has avoided need for HD.    Interval History  Cr stable at ~2.6, slowly on downtrend with nearly 4L of UOP with diuretics yesterday, 3L so far today without diuretics.  Does need ~100cc/hr of free water for hypernatremia due to loss in UOP, discussed with team, will continue to follow but no need for intervention today.      Assessment & Recommendations:   EJ on CKD-Baseline Cr of ~1.5 since transplant, peaked at ~3 but now slowly on downtrend.  Able to be net negative ~2L with lasix and diuril, already 1.5L negative today without diuretic since last evening.  Slowly recovering kidney fx, will continue to follow, no indication for HD although BUN is elevated (on TPN).     -Stable/Improving Cr, will follow.    Volume status-Some peripheral edema, has responded well to diuretics in previous days, having >2L UOP so far today with last diuretic last evening, would hold on further diuretics unless UOP is not achieving goal, would aim for net negative 1-2L.      Electrolytes/pH-Bicarb stable at 21, K+ 3.3 this am, replaced to 4.0, no acute issues.  Hypernatremia at 149, 2.8L of free water to correct, team likely will give IV due to GI issues.     Anemia-Hgb 8.5, stable.      Nutrition-On TPN, electrolytes stable.      Seen and discussed with Dr Gleason    Recommendations were communicated to primary team in person.      Esequiel William  Clinical Nurse Specialist  [phone]    Review of Systems:   I reviewed the following systems:  ROS not done due to vent/sedation.      Physical Exam:   I/O last 3 completed shifts:  In: 2686.76 [I.V.:995.58; NG/GT:160]  Out: 6450 [Urine:5000; Emesis/NG output:300; Chest Tube:1150]   /76  Pulse 101   Temp 99.5  F (37.5  C) (Axillary)  Resp 27  Ht 1.829 m (6')  Wt 96.3 kg (212 lb 4.9 oz)  SpO2 98%  BMI 28.79 kg/m2     GENERAL APPEARANCE: intubated, sedated  EYES:  no scleral icterus, pupils equal  HENT: ET tube  PULM: lungs clear to auscultation bilaterally, equal air movement, no clubbing  CV: regular rhythm, normal rate, no rub     -JVP not elevated     -edema 1+  Neuro - sedated  Lines right IJ TLC, no HD access.    Labs:   All labs reviewed by me  Electrolytes/Renal -   Recent Labs   Lab Test  07/10/17   0823  07/10/17   0340  07/09/17   1614   07/09/17   0321   NA   --   149*  147*   --   147*   POTASSIUM  4.0  3.3*  3.6   < >  3.3*   CHLORIDE   --   116*  117*   --   118*   CO2   --   21  21   --   20   BUN   --   125*  127*   --   114*   CR   --   2.62*  2.62*   --   2.51*   GLC   --   171*  148*   --   170*   JACOB   --   8.8  9.0   --   9.0   MAG   --   2.4*  2.5*   --   2.4*   PHOS   --   3.5  3.1   --   2.2*    < > = values in this interval not displayed.       CBC -   Recent Labs   Lab Test  07/10/17   0340  07/09/17   1614 07/09/17   0321   WBC  10.5  9.9  7.0   HGB  8.5*  8.2*  7.8*   PLT  71*  60*  56*       LFTs -   Recent Labs   Lab Test  07/10/17   0340 07/08/17   0330  07/07/17   0309   ALKPHOS  116  48  35*   BILITOTAL  0.7  0.7  0.6   ALT  34  15  16   AST  67*  29  25   PROTTOTAL  5.0*  5.0*  4.7*   ALBUMIN  1.8*  2.0*  2.2*       Iron Panel -   Recent Labs   Lab Test  02/08/16   1144   IRON  93   IRONSAT  41           Current Medications:    furosemide  40 mg Intravenous BID     pantoprazole  40 mg Oral or Feeding Tube Daily     ertapenem (INVanz) IV  1 g Intravenous Q24H     lipids  250 mL Intravenous Q24H     fludrocortisone  0.1 mg Oral Daily     sodium chloride (PF)  3 mL Intracatheter Q8H     tacrolimus  3 mg Oral QPM     tacrolimus  4 mg Oral QAM       D5W 50 mL/hr at 07/10/17 1102     parenteral nutrition - ADULT compounded formula 65 mL/hr at 07/09/17 2029     NaCl 10 mL/hr  "at 07/07/17 1600     IV fluid REPLACEMENT ONLY       insulin (regular) 8 Units/hr (07/10/17 1053)     IV fluid REPLACEMENT ONLY       This patient has been seen and evaluated by me as a shared visit with the NP/PA above.     I have reviewed today's results.    In brief:  No further clues as to cause of EJ. Creatinine slowly falling, not consistent with hemodynamoc effect of tacrolimus.    Physical exam:  /68 mmHg  Temp(Src) 97.3  F (36.3  C) (Tympanic)  Resp 20  Ht 1.549 m (5' 1\")  Wt 60.8 kg (134 lb 0.6 oz)  BMI 25.34 kg/m2  SpO2 100%  LMP 03/01/2013     My key findings:  Pulm: clear bilaterally  CV:      No murmur or rub   Edema:      Valdivia management decisions made by me:    Expectant management for EJ.          Alex Gleason MD         "

## 2017-07-10 NOTE — PLAN OF CARE
Problem: Goal Outcome Summary  Goal: Goal Outcome Summary  OT 4A: OT orders received. Cancel- pt receiving feeding tube at time of attempt.  Will reschedule.

## 2017-07-10 NOTE — PLAN OF CARE
Problem: Individualization  Goal: Patient Preferences  Outcome: No Change  Neuro status unchanged. Patient not following commands or verbal cues; moves extremities independently, intermittently opens eyes to voice and/or touch. HR sinus rhythm w/ rare PVC. BP elevated w/ turns and cares - medicated for pain w/ subsequent decrease in BP resulting. Lungs coarse w/ crackles. Copious tenacious sputum w/ suctioning, pressure supporting this AM. Small loose stool x2. Large volume urine output, vazquez in place. Incision c/d/i.

## 2017-07-10 NOTE — PROCEDURES
Small Bowel Feeding Tube Placement Assessment  Reason for Feeding Tube Placement: Desire small bowel FT with NGT decompression (vesna cespedes in R nare)  Cortrak Start Time: 14:28   Cortrak End Time: 14:42  Medicine Delivered During Procedure: Lidocaine to L nare  Placement Successful:  Presume post-pyloric (pending AXR confirmation).     Procedure Complications: None  Final Placement Alex at exit of nare 112 cm  Face to Face time with patient:30 min    Janice Christianson ,MARÍA, LD, [facility] (pgr [phone])

## 2017-07-10 NOTE — PROGRESS NOTES
Nephrology Progress Note  07/09/2017         Assessment & Recommendations:   Camacho Bhagat is a 51 year old man with h/o cirrhosis s/p OLT 3/2017 who is admitted with neutropenia and colitis, s/p exlap, no bowel removed but did have large insensible losses and shock requiring pressor support  1) acute kidney injury - baseline creatinine is 1.5 - 1.8 mg/dL since OLT 3/2017.  He is non-oliguric and creatinine is stable at 2.56.    2) nongap metabolic acidosis  - bicarb better at 21 - I do not see that sodium bicarbonate has been given,   3) anemia due to illness, IS and blood loss etc - hematology consult noted associated pancytopenia and thrombocytopenia likely related to infection and meds etc.  Hemoglobin is stable at 7.9  4) hypervolemia - good response to diuril and furosemide 60 mg IV - continue as needed to achieve negative fluid balance    Discussed with primary team, [facility]  Ninfa Hernández MD  [facility]  Department of Medicine  Division of Renal Disease and Hypertension  [phone]     Interval History :   In the last 24 hours Camacho Bhagat received IV furosemide, 60 mgx2 doses and diuril 1000 mg with resultant net negative fluid balance.  He was apneic on one spontaneous breath trial and tachypnic on another.  Per nursing, plan is to place chest tube for large right pleural effusion seen on CXR (I reviewed CXR report). intermittent elevated temps up to 101 F.  Remains intubated and sedated   Review of Systems:   Cannot obtain due to intubated sedated status    Physical Exam:   I/O last 3 completed shifts:  In: 3175.11 [I.V.:1464.28; NG/GT:100]  Out: 4645 [Urine:4195; Emesis/NG output:450]   /60  Pulse 101  Temp 99.5  F (37.5  C) (Axillary)  Resp 30  Ht 1.829 m (6')  Wt 99.9 kg (220 lb 3.8 oz)  SpO2 95%  BMI 29.87 kg/m2     GENERAL APPEARANCE: intubated, sedated  EYES:  no scleral icterus, pupils equal  HENT: ET tube  PULM: lungs clear to auscultation bilaterally,  equal air movement, no clubbing  CV: regular rhythm, normal rate, no rub     -JVP not elevated     -edema 1+  Neuro - sedated  Lines right IJ    Labs:   All labs reviewed by me  Electrolytes/Renal -   Recent Labs   Lab Test  07/09/17   1614  07/09/17   1334  07/09/17   0901  07/09/17   0321  07/08/17   1649   NA  147*   --    --   147*  146*   POTASSIUM  3.6  3.3*  3.6  3.3*  3.4   CHLORIDE  117*   --    --   118*  118*   CO2  21   --    --   20  19*   BUN  127*   --    --   114*  102*   CR  2.62*   --    --   2.51*  2.48*   GLC  148*   --    --   170*  183*   JACOB  9.0   --    --   9.0  8.7   MAG  2.5*   --    --   2.4*  2.4*   PHOS  3.1   --    --   2.2*  2.3*       CBC -   Recent Labs   Lab Test  07/09/17   1614  07/09/17   0321  07/08/17   1649   WBC  9.9  7.0  6.3   HGB  8.2*  7.8*  8.0*   PLT  60*  56*  46*       LFTs -   Recent Labs   Lab Test  07/08/17   0330  07/07/17   0309  07/05/17 2026   ALKPHOS  48  35*  29*   BILITOTAL  0.7  0.6  0.7   ALT  15  16  16   AST  29  25  24   PROTTOTAL  5.0*  4.7*  4.5*   ALBUMIN  2.0*  2.2*  2.4*       Iron Panel -   Recent Labs   Lab Test  02/08/16   1144   IRON  93   IRONSAT  41         Imaging:  All imaging studies reviewed by me.     Current Medications:    furosemide  60 mg Intravenous TID     QUEtiapine  50 mg Oral Q8H     potassium & sodium phosphates  1 packet Oral or Feeding Tube 4x Daily     pantoprazole  40 mg Oral or Feeding Tube Daily     ertapenem (INVanz) IV  1 g Intravenous Q24H     lipids  250 mL Intravenous Q24H     fludrocortisone  0.1 mg Oral Daily     sodium chloride (PF)  3 mL Intracatheter Q8H     tacrolimus  3 mg Oral QPM     tacrolimus  4 mg Oral QAM       parenteral nutrition - ADULT compounded formula       parenteral nutrition - ADULT compounded formula 65 mL/hr at 07/08/17 2019     NaCl 10 mL/hr at 07/07/17 1600     IV fluid REPLACEMENT ONLY       insulin (regular) 3 Units/hr (07/09/17 1619)     IV fluid REPLACEMENT ONLY       Ninfa Grullon  MD Betty

## 2017-07-11 ENCOUNTER — APPOINTMENT (OUTPATIENT)
Dept: GENERAL RADIOLOGY | Facility: CLINIC | Age: 52
End: 2017-07-11
Attending: TRANSPLANT SURGERY
Payer: COMMERCIAL

## 2017-07-11 ENCOUNTER — APPOINTMENT (OUTPATIENT)
Dept: ULTRASOUND IMAGING | Facility: CLINIC | Age: 52
End: 2017-07-11
Attending: NURSE PRACTITIONER
Payer: COMMERCIAL

## 2017-07-11 ENCOUNTER — APPOINTMENT (OUTPATIENT)
Dept: OCCUPATIONAL THERAPY | Facility: CLINIC | Age: 52
End: 2017-07-11
Attending: SURGERY
Payer: COMMERCIAL

## 2017-07-11 LAB
ALBUMIN SERPL-MCNC: 1.8 G/DL (ref 3.4–5)
ALBUMIN UR-MCNC: 10 MG/DL
ALP SERPL-CCNC: 158 U/L (ref 40–150)
ALT SERPL W P-5'-P-CCNC: 27 U/L (ref 0–70)
AMORPH CRY #/AREA URNS HPF: ABNORMAL /HPF
ANION GAP SERPL CALCULATED.3IONS-SCNC: 11 MMOL/L (ref 3–14)
ANION GAP SERPL CALCULATED.3IONS-SCNC: 9 MMOL/L (ref 3–14)
ANISOCYTOSIS BLD QL SMEAR: SLIGHT
ANISOCYTOSIS BLD QL SMEAR: SLIGHT
APPEARANCE UR: CLEAR
AST SERPL W P-5'-P-CCNC: 34 U/L (ref 0–45)
BACTERIA #/AREA URNS HPF: ABNORMAL /HPF
BACTERIA SPEC CULT: NO GROWTH
BACTERIA SPEC CULT: NO GROWTH
BACTERIA SPEC CULT: NORMAL
BASOPHILS # BLD AUTO: 0 10E9/L (ref 0–0.2)
BASOPHILS # BLD AUTO: 0.1 10E9/L (ref 0–0.2)
BASOPHILS NFR BLD AUTO: 0 %
BASOPHILS NFR BLD AUTO: 0.9 %
BILIRUB DIRECT SERPL-MCNC: 0.2 MG/DL (ref 0–0.2)
BILIRUB SERPL-MCNC: 0.5 MG/DL (ref 0.2–1.3)
BILIRUB UR QL STRIP: NEGATIVE
BUN SERPL-MCNC: 130 MG/DL (ref 7–30)
BUN SERPL-MCNC: 140 MG/DL (ref 7–30)
CALCIUM SERPL-MCNC: 8.2 MG/DL (ref 8.5–10.1)
CALCIUM SERPL-MCNC: 8.3 MG/DL (ref 8.5–10.1)
CHLORIDE SERPL-SCNC: 120 MMOL/L (ref 94–109)
CHLORIDE SERPL-SCNC: 121 MMOL/L (ref 94–109)
CO2 SERPL-SCNC: 20 MMOL/L (ref 20–32)
CO2 SERPL-SCNC: 20 MMOL/L (ref 20–32)
COLOR UR AUTO: YELLOW
CREAT SERPL-MCNC: 2.25 MG/DL (ref 0.66–1.25)
CREAT SERPL-MCNC: 2.57 MG/DL (ref 0.66–1.25)
DIFFERENTIAL METHOD BLD: ABNORMAL
DIFFERENTIAL METHOD BLD: ABNORMAL
EOSINOPHIL # BLD AUTO: 0.1 10E9/L (ref 0–0.7)
EOSINOPHIL # BLD AUTO: 0.3 10E9/L (ref 0–0.7)
EOSINOPHIL NFR BLD AUTO: 0.9 %
EOSINOPHIL NFR BLD AUTO: 2.6 %
ERYTHROCYTE [DISTWIDTH] IN BLOOD BY AUTOMATED COUNT: 14.9 % (ref 10–15)
ERYTHROCYTE [DISTWIDTH] IN BLOOD BY AUTOMATED COUNT: 15.2 % (ref 10–15)
GFR SERPL CREATININE-BSD FRML MDRD: 26 ML/MIN/1.7M2
GFR SERPL CREATININE-BSD FRML MDRD: 31 ML/MIN/1.7M2
GGT SERPL-CCNC: 58 U/L (ref 0–75)
GLUCOSE BLDC GLUCOMTR-MCNC: 115 MG/DL (ref 70–99)
GLUCOSE BLDC GLUCOMTR-MCNC: 118 MG/DL (ref 70–99)
GLUCOSE BLDC GLUCOMTR-MCNC: 124 MG/DL (ref 70–99)
GLUCOSE BLDC GLUCOMTR-MCNC: 129 MG/DL (ref 70–99)
GLUCOSE BLDC GLUCOMTR-MCNC: 135 MG/DL (ref 70–99)
GLUCOSE BLDC GLUCOMTR-MCNC: 137 MG/DL (ref 70–99)
GLUCOSE BLDC GLUCOMTR-MCNC: 144 MG/DL (ref 70–99)
GLUCOSE BLDC GLUCOMTR-MCNC: 145 MG/DL (ref 70–99)
GLUCOSE BLDC GLUCOMTR-MCNC: 149 MG/DL (ref 70–99)
GLUCOSE BLDC GLUCOMTR-MCNC: 150 MG/DL (ref 70–99)
GLUCOSE BLDC GLUCOMTR-MCNC: 151 MG/DL (ref 70–99)
GLUCOSE BLDC GLUCOMTR-MCNC: 155 MG/DL (ref 70–99)
GLUCOSE BLDC GLUCOMTR-MCNC: 159 MG/DL (ref 70–99)
GLUCOSE SERPL-MCNC: 137 MG/DL (ref 70–99)
GLUCOSE SERPL-MCNC: 167 MG/DL (ref 70–99)
GLUCOSE UR STRIP-MCNC: NEGATIVE MG/DL
GRAM STN SPEC: NORMAL
HCT VFR BLD AUTO: 27 % (ref 40–53)
HCT VFR BLD AUTO: 28.9 % (ref 40–53)
HGB BLD-MCNC: 8.5 G/DL (ref 13.3–17.7)
HGB BLD-MCNC: 9.3 G/DL (ref 13.3–17.7)
HGB UR QL STRIP: NEGATIVE
KETONES UR STRIP-MCNC: NEGATIVE MG/DL
LACTATE BLD-SCNC: 0.9 MMOL/L (ref 0.7–2.1)
LEUKOCYTE ESTERASE UR QL STRIP: NEGATIVE
LYMPHOCYTES # BLD AUTO: 0.2 10E9/L (ref 0.8–5.3)
LYMPHOCYTES # BLD AUTO: 0.3 10E9/L (ref 0.8–5.3)
LYMPHOCYTES NFR BLD AUTO: 1.8 %
LYMPHOCYTES NFR BLD AUTO: 2.6 %
Lab: NORMAL
MAGNESIUM SERPL-MCNC: 2.3 MG/DL (ref 1.6–2.3)
MAGNESIUM SERPL-MCNC: 2.3 MG/DL (ref 1.6–2.3)
MCH RBC QN AUTO: 27.8 PG (ref 26.5–33)
MCH RBC QN AUTO: 28.4 PG (ref 26.5–33)
MCHC RBC AUTO-ENTMCNC: 31.5 G/DL (ref 31.5–36.5)
MCHC RBC AUTO-ENTMCNC: 32.2 G/DL (ref 31.5–36.5)
MCV RBC AUTO: 88 FL (ref 78–100)
MCV RBC AUTO: 88 FL (ref 78–100)
METAMYELOCYTES # BLD: 0.2 10E9/L
METAMYELOCYTES NFR BLD MANUAL: 1.8 %
MICRO REPORT STATUS: NORMAL
MICROCYTES BLD QL SMEAR: PRESENT
MONOCYTES # BLD AUTO: 0.1 10E9/L (ref 0–1.3)
MONOCYTES # BLD AUTO: 0.6 10E9/L (ref 0–1.3)
MONOCYTES NFR BLD AUTO: 0.9 %
MONOCYTES NFR BLD AUTO: 5.1 %
MUCOUS THREADS #/AREA URNS LPF: PRESENT /LPF
MYELOCYTES # BLD: 0.2 10E9/L
MYELOCYTES # BLD: 0.5 10E9/L
MYELOCYTES NFR BLD MANUAL: 1.7 %
MYELOCYTES NFR BLD MANUAL: 4.5 %
NEUTROPHILS # BLD AUTO: 10.4 10E9/L (ref 1.6–8.3)
NEUTROPHILS # BLD AUTO: 10.8 10E9/L (ref 1.6–8.3)
NEUTROPHILS NFR BLD AUTO: 88 %
NEUTROPHILS NFR BLD AUTO: 89.2 %
NITRATE UR QL: NEGATIVE
OVALOCYTES BLD QL SMEAR: SLIGHT
PH UR STRIP: 5 PH (ref 5–7)
PHOSPHATE SERPL-MCNC: 4.4 MG/DL (ref 2.5–4.5)
PHOSPHATE SERPL-MCNC: 4.4 MG/DL (ref 2.5–4.5)
PLATELET # BLD AUTO: 80 10E9/L (ref 150–450)
PLATELET # BLD AUTO: 93 10E9/L (ref 150–450)
PLATELET # BLD EST: ABNORMAL 10*3/UL
POIKILOCYTOSIS BLD QL SMEAR: SLIGHT
POIKILOCYTOSIS BLD QL SMEAR: SLIGHT
POTASSIUM SERPL-SCNC: 3.8 MMOL/L (ref 3.4–5.3)
POTASSIUM SERPL-SCNC: 3.9 MMOL/L (ref 3.4–5.3)
PROT SERPL-MCNC: 5 G/DL (ref 6.8–8.8)
RADIOLOGIST FLAGS: ABNORMAL
RBC # BLD AUTO: 3.06 10E12/L (ref 4.4–5.9)
RBC # BLD AUTO: 3.27 10E12/L (ref 4.4–5.9)
RBC #/AREA URNS AUTO: <1 /HPF (ref 0–2)
SODIUM SERPL-SCNC: 150 MMOL/L (ref 133–144)
SODIUM SERPL-SCNC: 150 MMOL/L (ref 133–144)
SP GR UR STRIP: 1.01 (ref 1–1.03)
SPECIMEN SOURCE: NORMAL
TACROLIMUS BLD-MCNC: 6 UG/L (ref 5–15)
TME LAST DOSE: NORMAL H
URN SPEC COLLECT METH UR: ABNORMAL
UROBILINOGEN UR STRIP-MCNC: NORMAL MG/DL (ref 0–2)
WBC # BLD AUTO: 11.7 10E9/L (ref 4–11)
WBC # BLD AUTO: 12.3 10E9/L (ref 4–11)
WBC #/AREA URNS AUTO: <1 /HPF (ref 0–2)

## 2017-07-11 PROCEDURE — 80076 HEPATIC FUNCTION PANEL: CPT | Performed by: SURGERY

## 2017-07-11 PROCEDURE — 71010 XR CHEST PORT 1 VW: CPT | Mod: 77

## 2017-07-11 PROCEDURE — 94681 O2 UPTK CO2 OUTP % O2 XTRC: CPT

## 2017-07-11 PROCEDURE — 81001 URINALYSIS AUTO W/SCOPE: CPT | Performed by: NURSE PRACTITIONER

## 2017-07-11 PROCEDURE — 94003 VENT MGMT INPAT SUBQ DAY: CPT

## 2017-07-11 PROCEDURE — 97530 THERAPEUTIC ACTIVITIES: CPT | Mod: GO | Performed by: OCCUPATIONAL THERAPIST

## 2017-07-11 PROCEDURE — 40000014 ZZH STATISTIC ARTERIAL MONITORING DAILY

## 2017-07-11 PROCEDURE — 80048 BASIC METABOLIC PNL TOTAL CA: CPT | Performed by: SURGERY

## 2017-07-11 PROCEDURE — 25000131 ZZH RX MED GY IP 250 OP 636 PS 637: Performed by: SURGERY

## 2017-07-11 PROCEDURE — 85025 COMPLETE CBC W/AUTO DIFF WBC: CPT | Performed by: SURGERY

## 2017-07-11 PROCEDURE — 40000196 ZZH STATISTIC RAPCV CVP MONITORING

## 2017-07-11 PROCEDURE — 27210437 ZZH NUTRITION PRODUCT SEMIELEM INTERMED LITER

## 2017-07-11 PROCEDURE — 83735 ASSAY OF MAGNESIUM: CPT | Performed by: SURGERY

## 2017-07-11 PROCEDURE — 93931 UPPER EXTREMITY STUDY: CPT | Mod: RT

## 2017-07-11 PROCEDURE — 87186 SC STD MICRODIL/AGAR DIL: CPT | Performed by: NURSE PRACTITIONER

## 2017-07-11 PROCEDURE — 25000132 ZZH RX MED GY IP 250 OP 250 PS 637: Performed by: STUDENT IN AN ORGANIZED HEALTH CARE EDUCATION/TRAINING PROGRAM

## 2017-07-11 PROCEDURE — 25000132 ZZH RX MED GY IP 250 OP 250 PS 637

## 2017-07-11 PROCEDURE — 40000133 ZZH STATISTIC OT WARD VISIT: Performed by: OCCUPATIONAL THERAPIST

## 2017-07-11 PROCEDURE — 87496 CYTOMEG DNA AMP PROBE: CPT | Performed by: NURSE PRACTITIONER

## 2017-07-11 PROCEDURE — 87070 CULTURE OTHR SPECIMN AEROBIC: CPT | Performed by: NURSE PRACTITIONER

## 2017-07-11 PROCEDURE — 25000125 ZZHC RX 250: Performed by: TRANSPLANT SURGERY

## 2017-07-11 PROCEDURE — 40000275 ZZH STATISTIC RCP TIME EA 10 MIN

## 2017-07-11 PROCEDURE — 80197 ASSAY OF TACROLIMUS: CPT | Performed by: PHYSICIAN ASSISTANT

## 2017-07-11 PROCEDURE — 83605 ASSAY OF LACTIC ACID: CPT | Performed by: STUDENT IN AN ORGANIZED HEALTH CARE EDUCATION/TRAINING PROGRAM

## 2017-07-11 PROCEDURE — 25000128 H RX IP 250 OP 636: Performed by: NURSE PRACTITIONER

## 2017-07-11 PROCEDURE — 99291 CRITICAL CARE FIRST HOUR: CPT | Performed by: NURSE PRACTITIONER

## 2017-07-11 PROCEDURE — 82977 ASSAY OF GGT: CPT | Performed by: SURGERY

## 2017-07-11 PROCEDURE — 25000128 H RX IP 250 OP 636: Performed by: PHYSICIAN ASSISTANT

## 2017-07-11 PROCEDURE — 25000128 H RX IP 250 OP 636: Performed by: STUDENT IN AN ORGANIZED HEALTH CARE EDUCATION/TRAINING PROGRAM

## 2017-07-11 PROCEDURE — 87077 CULTURE AEROBIC IDENTIFY: CPT | Performed by: NURSE PRACTITIONER

## 2017-07-11 PROCEDURE — 97165 OT EVAL LOW COMPLEX 30 MIN: CPT | Mod: GO | Performed by: OCCUPATIONAL THERAPIST

## 2017-07-11 PROCEDURE — 25000131 ZZH RX MED GY IP 250 OP 636 PS 637: Performed by: PHYSICIAN ASSISTANT

## 2017-07-11 PROCEDURE — 84100 ASSAY OF PHOSPHORUS: CPT | Performed by: SURGERY

## 2017-07-11 PROCEDURE — 25000125 ZZHC RX 250: Performed by: PHYSICIAN ASSISTANT

## 2017-07-11 PROCEDURE — 87040 BLOOD CULTURE FOR BACTERIA: CPT | Performed by: NURSE PRACTITIONER

## 2017-07-11 PROCEDURE — 25000132 ZZH RX MED GY IP 250 OP 250 PS 637: Performed by: SURGERY

## 2017-07-11 PROCEDURE — 25000132 ZZH RX MED GY IP 250 OP 250 PS 637: Performed by: NURSE PRACTITIONER

## 2017-07-11 PROCEDURE — 20000004 ZZH R&B ICU UMMC

## 2017-07-11 PROCEDURE — 00000146 ZZHCL STATISTIC GLUCOSE BY METER IP

## 2017-07-11 PROCEDURE — 71010 XR CHEST PORT 1 VW: CPT

## 2017-07-11 PROCEDURE — 87205 SMEAR GRAM STAIN: CPT | Performed by: NURSE PRACTITIONER

## 2017-07-11 RX ORDER — AMOXICILLIN 250 MG
1 CAPSULE ORAL AT BEDTIME
Status: DISCONTINUED | OUTPATIENT
Start: 2017-07-11 | End: 2017-07-12

## 2017-07-11 RX ORDER — VALGANCICLOVIR HYDROCHLORIDE 50 MG/ML
450 POWDER, FOR SOLUTION ORAL EVERY OTHER DAY
Status: DISCONTINUED | OUTPATIENT
Start: 2017-07-11 | End: 2017-07-11

## 2017-07-11 RX ORDER — ASPIRIN 81 MG/1
81 TABLET, CHEWABLE ORAL DAILY
Status: DISCONTINUED | OUTPATIENT
Start: 2017-07-11 | End: 2017-07-18

## 2017-07-11 RX ORDER — POLYETHYLENE GLYCOL 3350 17 G/17G
17 POWDER, FOR SOLUTION ORAL DAILY
Status: DISCONTINUED | OUTPATIENT
Start: 2017-07-11 | End: 2017-07-13

## 2017-07-11 RX ORDER — HEPARIN SODIUM 5000 [USP'U]/.5ML
5000 INJECTION, SOLUTION INTRAVENOUS; SUBCUTANEOUS EVERY 8 HOURS
Status: DISCONTINUED | OUTPATIENT
Start: 2017-07-11 | End: 2017-08-09

## 2017-07-11 RX ADMIN — HUMAN INSULIN 6 UNITS/HR: 100 INJECTION, SOLUTION SUBCUTANEOUS at 00:05

## 2017-07-11 RX ADMIN — HUMAN INSULIN 6 UNITS/HR: 100 INJECTION, SOLUTION SUBCUTANEOUS at 03:30

## 2017-07-11 RX ADMIN — POTASSIUM CHLORIDE 20 MEQ: 1.5 POWDER, FOR SOLUTION ORAL at 05:04

## 2017-07-11 RX ADMIN — CALCIUM GLUCONATE: 94 INJECTION, SOLUTION INTRAVENOUS at 20:57

## 2017-07-11 RX ADMIN — HUMAN INSULIN 6 UNITS/HR: 100 INJECTION, SOLUTION SUBCUTANEOUS at 21:27

## 2017-07-11 RX ADMIN — POLYETHYLENE GLYCOL 3350 17 G: 17 POWDER, FOR SOLUTION ORAL at 12:28

## 2017-07-11 RX ADMIN — SENNOSIDES AND DOCUSATE SODIUM 1 TABLET: 8.6; 5 TABLET ORAL at 21:27

## 2017-07-11 RX ADMIN — TACROLIMUS 4 MG: 5 CAPSULE ORAL at 18:14

## 2017-07-11 RX ADMIN — HEPARIN SODIUM 5000 UNITS: 5000 INJECTION, SOLUTION INTRAVENOUS; SUBCUTANEOUS at 20:56

## 2017-07-11 RX ADMIN — OXYCODONE HYDROCHLORIDE 5 MG: 5 TABLET ORAL at 00:05

## 2017-07-11 RX ADMIN — ERTAPENEM SODIUM 1 G: 1 INJECTION, POWDER, LYOPHILIZED, FOR SOLUTION INTRAMUSCULAR; INTRAVENOUS at 11:14

## 2017-07-11 RX ADMIN — ASPIRIN 81 MG CHEWABLE TABLET 81 MG: 81 TABLET CHEWABLE at 18:14

## 2017-07-11 RX ADMIN — FLUDROCORTISONE ACETATE 0.1 MG: 0.1 TABLET ORAL at 08:36

## 2017-07-11 RX ADMIN — POTASSIUM CHLORIDE 20 MEQ: 1.5 POWDER, FOR SOLUTION ORAL at 18:18

## 2017-07-11 RX ADMIN — DEXTROSE MONOHYDRATE: 50 INJECTION, SOLUTION INTRAVENOUS at 06:42

## 2017-07-11 RX ADMIN — TACROLIMUS 4 MG: 5 CAPSULE ORAL at 08:36

## 2017-07-11 RX ADMIN — PANTOPRAZOLE SODIUM 40 MG: 40 TABLET, DELAYED RELEASE ORAL at 08:36

## 2017-07-11 RX ADMIN — ACETAMINOPHEN 650 MG: 325 TABLET, FILM COATED ORAL at 12:28

## 2017-07-11 RX ADMIN — ACETAMINOPHEN 650 MG: 325 TABLET, FILM COATED ORAL at 00:05

## 2017-07-11 RX ADMIN — HEPARIN SODIUM 5000 UNITS: 5000 INJECTION, SOLUTION INTRAVENOUS; SUBCUTANEOUS at 12:27

## 2017-07-11 ASSESSMENT — ACTIVITIES OF DAILY LIVING (ADL): PREVIOUS_RESPONSIBILITIES: MEAL PREP;HOUSEKEEPING;LAUNDRY;SHOPPING;YARDWORK;MEDICATION MANAGEMENT;FINANCES;DRIVING

## 2017-07-11 NOTE — PLAN OF CARE
Problem: Restraint for Non-Violent/Non-Self-Destructive Behavior  Goal: Prevent/Manage Potential Problems  Maintain safety of patient and others during period of restraint.  Promote psychological and physical wellbeing.  Prevent injury to skin and involved body parts.  Promote nutrition, hydration, and elimination.   Outcome: No Change  Continuing need for bilateral soft wrist restraints. Attempting to pull at lines/tubing/ETT when not in restraints. Will continue to reassess need.

## 2017-07-11 NOTE — PROGRESS NOTES
Nephrology Progress Note  07/11/2017         Camacho Bhagat is a 51 year old man with h/o cirrhosis s/p OLT 3/2017 who is admitted with neutropenia and colitis, s/p exlap, no bowel removed but did have large insensible losses and shock requiring pressor support, EJ on CKD due to sepsis but has avoided need for HD.     Interval History  Cr stable at ~2.6, slowly on downtrend with 4L of UOP with single dose lasix yesterday, on track for similar UOP without diuretics today.  Does need ~100cc/hr of free water for hypernatremia due to loss in UOP (likely will need more due to ongoing losses but should start to correct), discussed with team, will continue to follow but no need for intervention today beyond free water to correct hypernatremia.         Assessment & Recommendations:   EJ on CKD-Baseline Cr of ~1.5 since transplant, peaked at ~3 but now slowly on downtrend, stable at 2.6 for past 2 days.  Able to be net negative, now without diuretics.  Slowly recovering kidney fx, will continue to follow, no indication for HD although BUN is elevated (on TPN).                           -Stable/Improving Cr, will follow.     Volume status-Some peripheral edema, has responded well to diuretics in previous days, now with polyuria without diuretics likely due to BUN diuresis.  Has mild peripheral edema but overall trending towards euvolemic, wt is on downtrend and is net negative without intervention.     Electrolytes/pH-Bicarb stable at ~20, K+ 3.8 this am.  Hypernatremia at 150, ~3L of free water to correct, team likely will give IV due to GI issues.     ID-Admitted with colitis, had + VRE culture, recent cultures NGTD.  Did have fever overnight, WBC=10.      OLT-On tacro, level=6, cellcept on hold due to neutropenia.         Endocrine-On prior to admission dose of Florinef-on H&P listed for treatment of elevated potassium.       Anemia-Hgb 8.5, stable.       Nutrition-On TPN, electrolytes stable.       Seen and discussed  with Dr Jackson     Recommendations were communicated to primary team in person.         Esequiel William  Clinical Nurse Specialist  [phone]    Review of Systems:   I reviewed the following systems:  ROS not done due to vent/sedation.      Physical Exam:   I/O last 3 completed shifts:  In: 4015.67 [I.V.:1569.92; NG/GT:405]  Out: 3845 [Urine:3700; Emesis/NG output:75; Chest Tube:70]   /76  Pulse 101  Temp 99.6  F (37.6  C) (Axillary)  Resp 29  Ht 1.829 m (6')  Wt 93.4 kg (205 lb 14.6 oz)  SpO2 96%  BMI 27.93 kg/m2     GENERAL APPEARANCE: intubated, sedated  EYES:  no scleral icterus, pupils equal  HENT: ET tube  PULM: lungs clear to auscultation bilaterally, equal air movement, no clubbing  CV: regular rhythm, normal rate, no rub     -JVP not elevated     -edema 1+  Neuro - sedated  Lines right IJ TLC, no HD access.       Labs:   All labs reviewed by me  Electrolytes/Renal -   Recent Labs   Lab Test  07/11/17   0328  07/10/17   1537  07/10/17   0823  07/10/17   0340   NA  150*  148*   --   149*   POTASSIUM  3.8  3.9  4.0  3.3*   CHLORIDE  120*  117*   --   116*   CO2  20  22   --   21   BUN  140*  136*   --   125*   CR  2.57*  2.54*   --   2.62*   GLC  137*  153*   --   171*   JACOB  8.2*  8.5   --   8.8   MAG  2.3  2.3   --   2.4*   PHOS  4.4  4.4   --   3.5       CBC -   Recent Labs   Lab Test  07/11/17   0328  07/10/17   1537  07/10/17   0340   WBC  11.7*  11.2*  10.5   HGB  8.5*  8.8*  8.5*   PLT  80*  75*  71*       LFTs -   Recent Labs   Lab Test  07/11/17   0328  07/10/17   0340  07/08/17   0330   ALKPHOS  158*  116  48   BILITOTAL  0.5  0.7  0.7   ALT  27  34  15   AST  34  67*  29   PROTTOTAL  5.0*  5.0*  5.0*   ALBUMIN  1.8*  1.8*  2.0*       Iron Panel -   Recent Labs   Lab Test  02/08/16   1144   IRON  93   IRONSAT  41           Current Medications:    heparin  5,000 Units Subcutaneous Q8H     senna-docusate  1 tablet Oral At Bedtime     polyethylene glycol  17 g Oral Daily     tacrolimus  4  mg Oral BID IS     lidocaine (viscous)  5 mL Topical Once     pantoprazole  40 mg Oral or Feeding Tube Daily     ertapenem (INVanz) IV  1 g Intravenous Q24H     lipids  250 mL Intravenous Q24H     fludrocortisone  0.1 mg Oral Daily     sodium chloride (PF)  3 mL Intracatheter Q8H       parenteral nutrition - ADULT compounded formula       D5W 100 mL/hr at 07/11/17 0834     parenteral nutrition - ADULT compounded formula 65 mL/hr at 07/10/17 2027     IV fluid REPLACEMENT ONLY       insulin (regular) 6 Units/hr (07/11/17 0700)     Attestation:  This patient has been seen, examined and evaluated by me, Char Jackson as a shared visit with the NP/PA above.    In brief:  Camacho Bhagat is a 51 year old male S/P OLT 3/2017 adm with typhlitis now S/P E lap with wound vac.  EJ with Cr peak at 3, now holding at 2.5 with good UOP.      Physical exam:  Vitals along with Ins/outs reviewed.    My key findings:  CV: RSR  Pulm: clear bilaterally  Abd: wound vac in place      Key management decisions made by me:   1.  EJ: does not need HD  2.  Hypernatremia: needs additional free water as D5 since cannot use gut.      Plan:  Monitor Cr daily and assess for HD  Increase IV D5    I have reviewed today's vitals and labs.    Char Jackson MD

## 2017-07-11 NOTE — CONSULTS
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 The hospital course has been complicated by development of severe neutropenia (ANC <500), thrombocytopenia, anemia now improved, pleural effusion and EJ.  Neurology was consulted due to altered mental status.    On 7/4 he was admitted  Intubated and sedated and  and since fentanyl was d/c on 7/8,  he has bhavya agitated.  For his agitation, he is on seroquel.  Per team, he follows commands on/off since yesterday and tracks since yesterday.                  Past Medical History:     Past Medical History:   Diagnosis Date     Cancer (H)      Depressive disorder, not elsewhere classified      Esophageal reflux      Fibromyalgia 1/2009    dx with Dr Benitez( [facility])     History of thrombophlebitis      Osteoarthritis      Other acute embolism veins 11/01    Deep vein thrombophlebitis, filter placed     Other and unspecified hyperlipidemia      Other chronic nonalcoholic liver disease     Fatty liver      Other testicular hypofunction      Pneumonia, organism 10-01    Included ARDS, sepsis, and  acute renal failure; hospitalized     Rheumatoid arthritis(714.0)      Type II or unspecified type diabetes mellitus without mention of complication, not stated as uncontrolled 11/01    Managed by endocrinology     Unspecified essential hypertension 11/01    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     Unspecified sleep apnea     Uses BiPAP              Past Surgical History:     Past Surgical History:   Procedure Laterality Date     BENCH LIVER N/A 3/4/2017    Procedure: BENCH LIVER;  Surgeon: Jovan Tarn MD;  Location: [facility] OR     C NONSPECIFIC PROCEDURE      tracheostomy     C NONSPECIFIC PROCEDURE      repair of  deviated septum     C NONSPECIFIC PROCEDURE      Rt knee arthroscopy     C TOTAL KNEE ARTHROPLASTY  2008    Right knee arthroscopy     CHOLECYSTECTOMY       ESOPHAGOSCOPY, GASTROSCOPY, DUODENOSCOPY (EGD), COMBINED N/A 2016    Procedure: COMBINED ESOPHAGOSCOPY, GASTROSCOPY, DUODENOSCOPY (EGD), BIOPSY SINGLE OR MULTIPLE;  Surgeon: Trent Pederson MD;  Location: [facility]     LAPAROTOMY EXPLORATORY N/A 2017    Procedure: LAPAROTOMY EXPLORATORY;  Exploratory Laparotomy, washout;  Surgeon: Tip Zhang MD;  Location: [facility] OR     LAPAROTOMY EXPLORATORY N/A 2017    Procedure: LAPAROTOMY EXPLORATORY;  Exploratory Laparotomy, Washout with closure.;  Surgeon: Tip Zhang MD;  Location: [facility] OR     TRANSPLANT LIVER RECIPIENT  DONOR N/A 3/4/2017    Procedure: TRANSPLANT LIVER RECIPIENT  DONOR;  Surgeon: Jovan Tran MD;  Location: [facility] OR              Social History:   I have reviewed this patient's social history           Family History:     Family History   Problem Relation Age of Onset     DIABETES Father      Hypertension Father      Substance Abuse Father      Arthritis Mother      CANCER Mother      Thyroid     Thyroid Disease Mother      Other Cancer Mother      Colon Cancer No family hx of      Hyperlipidemia No family hx of      Coronary Artery Disease No family hx of      CEREBROVASCULAR DISEASE No family hx of      Breast Cancer No family hx of      Prostate Cancer No family hx of               Allergies:     Allergies   Allergen Reactions     Erythromycin GI Disturbance     Vioxx      Nausea, vomiting             Medications:     Current Facility-Administered Medications   Medication     heparin sodium PF injection 5,000 Units     senna-docusate (SENOKOT-S;PERICOLACE) 8.6-50 MG per tablet 1 tablet     polyethylene glycol (MIRALAX/GLYCOLAX) Packet 17 g     parenteral nutrition - ADULT compounded formula     tacrolimus (PROGRAF - GENERIC EQUIVALENT) suspension 4 mg      [START ON 7/12/2017] lipids (INTRALIPID) 20 % infusion 250 mL     aspirin chewable tablet 81 mg     dextrose 5% infusion     parenteral nutrition - ADULT compounded formula     lidocaine (viscous) (XYLOCAINE) 2 % solution 5 mL     dextrose 10 % 1,000 mL infusion     pantoprazole (PROTONIX) suspension 40 mg     ertapenem (INVanz) 1 g vial to attach to  mL bag     insulin 1 unit/mL in saline (NovoLIN, HumuLIN Regular) drip - ADULT IV Infusion     glucose 40 % gel 15-30 g    Or     dextrose 50 % injection 25-50 mL    Or     glucagon injection 1 mg     fludrocortisone (FLORINEF) tablet 0.1 mg     lidocaine 1 % 1 mL     lidocaine (LMX4) kit     sodium chloride (PF) 0.9% PF flush 3 mL     sodium chloride (PF) 0.9% PF flush 3 mL     ondansetron (ZOFRAN-ODT) ODT tab 4 mg    Or     ondansetron (ZOFRAN) injection 4 mg     bisacodyl (DULCOLAX) Suppository 10 mg     naloxone (NARCAN) injection 0.1-0.4 mg     acetaminophen (TYLENOL) tablet 650 mg    Or     acetaminophen (TYLENOL) Suppository 650 mg     potassium chloride SA (K-DUR/KLOR-CON M) CR tablet 20-40 mEq     potassium chloride (KLOR-CON) Packet 20-40 mEq     potassium chloride 10 mEq in 100 mL intermittent infusion     potassium chloride 10 mEq in 100 mL intermittent infusion with 10 mg lidocaine     potassium chloride 20 mEq in 50 mL intermittent infusion     sodium phosphate 10 mmol in D5W intermittent infusion     sodium phosphate 15 mmol in D5W intermittent infusion     sodium phosphate 20 mmol in D5W intermittent infusion     sodium phosphate 25 mmol in D5W intermittent infusion     magnesium sulfate 2 g in NS intermittent infusion ([employer]ar[employer] or FV Cmpd)     magnesium sulfate 4 g in 100 mL sterile water (premade)               Physical Exam:   Vital signs:  Temp: 99.5  F (37.5  C) Temp src: Axillary     Heart Rate: 96 Resp: 30 SpO2: 95 % O2 Device: Mechanical Ventilator Oxygen Delivery: 3 LPM Height: 182.9 cm (6') Weight: 93.4 kg (205 lb 14.6  oz)  Estimated body mass index is 27.93 kg/(m^2) as calculated from the following:    Height as of this encounter: 1.829 m (6').    Weight as of this encounter: 93.4 kg (205 lb 14.6 oz).    Constitutional : well-built, seated in a chair  Head: atraumatic, anicteric. See neuroexam  Eyes: see neuroexam  CVC: sinus in monitor  LUng: intubated.   Extremities: purple finger on R hand and purple toes on R foot. Seems to have pain on arm and leg.   Neuroexam  Alert but attention decreases. Tracks. Follow simple  Commands on and off.   Grimaces symmetrically.   Eyes: motility preserved, no nystagmus  No tremors, no myoclonus or asterixis.   Moves over the gravity both arms and legs. Withdrawal to pain all 4.   Reflexes decreases in legs, but symmetric.          Data:     Results for orders placed or performed during the hospital encounter of 07/04/17 (from the past 24 hour(s))   Glucose by meter   Result Value Ref Range    Glucose 121 (H) 70 - 99 mg/dL   Glucose by meter   Result Value Ref Range    Glucose 129 (H) 70 - 99 mg/dL   Glucose by meter   Result Value Ref Range    Glucose 131 (H) 70 - 99 mg/dL   Glucose by meter   Result Value Ref Range    Glucose 150 (H) 70 - 99 mg/dL   XR Chest Port 1 View    Narrative    EXAM: XR CHEST PORT 1 VW  7/11/2017 1:45 AM     HISTORY:  intubated       COMPARISON: 7/10/2017    FINDINGS: Stable interval tube, right central line, enteric tubes and  right basilar chest tube.     Unchanged myocardial likely. Stable perihilar and bibasilar pulmonary  opacities. Small right greater than left pleural effusions. No  pneumothorax.      Impression    IMPRESSION: Stable perihilar and bibasilar opacities and right greater  than left pleural effusion. Findings may represent edema or infection.    I have personally reviewed the examination and initial interpretation  and I agree with the findings.    DALLIN RENAE MD   Glucose by meter   Result Value Ref Range    Glucose 159 (H) 70 - 99 mg/dL    Lactic acid whole blood   Result Value Ref Range    Lactic Acid 0.9 0.7 - 2.1 mmol/L   Basic metabolic panel   Result Value Ref Range    Sodium 150 (H) 133 - 144 mmol/L    Potassium 3.8 3.4 - 5.3 mmol/L    Chloride 120 (H) 94 - 109 mmol/L    Carbon Dioxide 20 20 - 32 mmol/L    Anion Gap 11 3 - 14 mmol/L    Glucose 137 (H) 70 - 99 mg/dL    Urea Nitrogen 140 (H) 7 - 30 mg/dL    Creatinine 2.57 (H) 0.66 - 1.25 mg/dL    GFR Estimate 26 (L) >60 mL/min/1.7m2    GFR Estimate If Black 32 (L) >60 mL/min/1.7m2    Calcium 8.2 (L) 8.5 - 10.1 mg/dL   Magnesium   Result Value Ref Range    Magnesium 2.3 1.6 - 2.3 mg/dL            Assessment and Plan:   52 yo man with DM2, 5 liver transplant (3/2017) for 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 Likely toxic - metabolic in context of EJ, sepsis and recent sedation. Exam seems to be non focal and non suggestive of seizures.     Neurology will keep following.         Attestation:  PAtient seen  And discussed with Dr. Mckoy.       Grace Sands  PGY4 Neurology  [phone]

## 2017-07-11 NOTE — PROGRESS NOTES
[facility]  Transplant Infectious Disease Progress Note     Patient:  Camacho Bhagat, Date of birth 1965, Medical record number [id number]  Date of Visit:  07/11/2017         Assessment and Recommendations:   Recommendations:  - continue ertapenem for now  - would image abdomen to rule out any abscess formation as he continuous to be febrile - prefer CT scan  - follow weekly CMV PCR while off valcyte  - would not treat VRE in sputum - with his low oxygen requirements and unchanged CXR would hold on antibiotics. No clear evidence of aspiration pneumonia.   - agree with decrease in immunosuppression    Transplant Infectious Disease will continue to follow with you.    Assessment: 51 y.o. 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 He is now s/p ex-lap and washout with wound closure. We are asked to comment on antibiotics and ongoing workup.     Colitis - he developed acute onset of pain. He is s/p ex-lap on 7/4 showing inflamed cecum and ascending colon. He had wound closure on 7/5. Cultures from intra-op showed staph capitis  in anaerobic culture  In terms of antibiotic selection, zosyn alone should be sufficient in covering anaerobes and enteric gram negatives. His pancytopenia pre-existed zosyn administration and pancytopenia is a very uncommon occurrence with zosyn. He has been maintained on ertapenem which is adequate coverage but he continues to have fevers. Would image abdomen to rule out any fluid collections.     Pancytopenia - ongoing since early June. Likely related to his immunosuppression. Heme/onc now following. Agree with decrease in immunosuppressive regimen. His valcyte has been stopped. His counts are improving      CMV+ PCR  - likely due to level of immunosuppression and cessation of valcyte. He is high risk being D+/R- and thus would check CMV DNA PCR weekly while off valcyte    VRE in  sputum - isolated from BAL. He otherwise has had minimal Oxygen requirements and unchanged imaging. He is colonized with VRE (rectal swab).  If inclined to treat, would favor tigecycline over synercid.      Other ID issues:  Bacterial PPx: on antibiotics for colitis  PJP ppx: off bactrim 2/2 leukopenia  Fungal Ppx: none  SeroStatus: CMV D+/R-, EBV D+/R-  Gamma Globulin Status: last checked in 2011  Immunization Status: not yet done     Discussed with Dr. Francis Dotson D.O.  Infectious Diseases Fellow  [phone]    Physician Attestation   I personally examined and evaluated this patient.  I discussed the patient with Dr. Dotson and agree with the assessment and plan of care as documented in this edited note. I personally reviewed vital signs, medications, labs and imaging.  YADY AMIN MD  Infectious Diseases Attending  Pager: [phone]          Interval History:   Continues to be intubated and sedated. Febrile again overnight. Oxygen requirements remain uncharged. Opens his eyes briefly but not following any commands for me this AM. Abdominal XR showing possible ileus.       Transplants:  3/4/2017 (Liver), Postoperative day:  129.  Coordinator Abril Doty    Review of Systems:  unable to obtain 2/2 clinical condition          Current Medications & Allergies:       heparin  5,000 Units Subcutaneous Q8H     senna-docusate  1 tablet Oral At Bedtime     polyethylene glycol  17 g Oral Daily     tacrolimus  4 mg Oral BID IS     [START ON 7/12/2017] lipids  250 mL Intravenous Once per day on Mon Wed Fri     lidocaine (viscous)  5 mL Topical Once     pantoprazole  40 mg Oral or Feeding Tube Daily     ertapenem (INVanz) IV  1 g Intravenous Q24H     fludrocortisone  0.1 mg Oral Daily     sodium chloride (PF)  3 mL Intracatheter Q8H       Infusions/Drips:    parenteral nutrition - ADULT compounded formula       D5W 100 mL/hr at 07/11/17 0834     parenteral nutrition - ADULT compounded formula 65 mL/hr at  07/10/17 2027     IV fluid REPLACEMENT ONLY       insulin (regular) 7 Units/hr (07/11/17 1200)       Allergies   Allergen Reactions     Erythromycin GI Disturbance     Vioxx      Nausea, vomiting            Physical Exam:   Vitals were reviewed.  All vitals stable.  Patient Vitals for the past 24 hrs:   Temp Temp src Resp SpO2 Weight   07/11/17 1300 - - - 96 % -   07/11/17 1200 101.3  F (38.5  C) Axillary 28 98 % -   07/11/17 1100 - - - 98 % -   07/11/17 1000 - - - 98 % -   07/11/17 0900 - - - 98 % -   07/11/17 0818 - - - 93 % -   07/11/17 0800 99.6  F (37.6  C) Axillary 29 98 % -   07/11/17 0700 - - - 99 % -   07/11/17 0600 - - 30 99 % -   07/11/17 0500 - - - 97 % -   07/11/17 0400 99.8  F (37.7  C) Axillary 26 97 % 93.4 kg (205 lb 14.6 oz)   07/11/17 0300 - - - 98 % -   07/11/17 0200 - - 27 97 % -   07/11/17 0100 - - - 96 % -   07/11/17 0000 101.3  F (38.5  C) Axillary (!) 33 95 % -   07/10/17 2300 - - - 98 % -   07/10/17 2200 - - 28 98 % -   07/10/17 2100 - - - 97 % -   07/10/17 2000 101.8  F (38.8  C) Axillary 26 96 % -   07/10/17 1900 - - - 96 % -   07/10/17 1800 - - - 97 % -   07/10/17 1700 - - - 98 % -   07/10/17 1600 98.5  F (36.9  C) Axillary 25 97 % -     Ranges for vital signs:  Temp:  [98.5  F (36.9  C)-101.8  F (38.8  C)] 101.3  F (38.5  C)  Heart Rate:  [] 99  Resp:  [25-33] 28  MAP:  [75 mmHg-120 mmHg] 96 mmHg  Arterial Line BP: (112-183)/(53-82) 144/70  FiO2 (%):  [30 %] 30 %  SpO2:  [93 %-99 %] 96 %  Vitals:    07/09/17 0400 07/10/17 0600 07/11/17 0400   Weight: 99.9 kg (220 lb 3.8 oz) 96.3 kg (212 lb 4.9 oz) 93.4 kg (205 lb 14.6 oz)       Physical Examination:  GENERAL:  well-developed, well-nourished,in ICU bed, intubated  HEAD:  Head is normocephalic, atraumatic   ENT:  ETT in place  LUNGS: coarse throughout  CARDIOVASCULAR:  Regular rate and rhythm with no murmurs,   ABDOMEN:  Quiet bowel sounds, surgical incision dressed.   SKIN:  No acute rashes.  Line in place without any surrounding  erythema or exudate.  NEUROLOGIC:  Sedated         Laboratory Data:     No results found for: ACD4    Inflammatory Markers    Recent Labs   Lab Test  07/05/17   1810  03/05/17   0358  11/23/16   0813  11/16/16   0706  11/15/16   1039  11/07/16   0759   08/15/11   1151  04/11/11   1209  01/03/11   1246  02/15/10   1232   SED   --    --   45*   --    --    --    --   11  14  17*  25*   CRP  354.0  20.0*  58.0*  59.1*  49.8*  66.0*   < >  11.1*  9.0*  11.7*  17.5*    < > = values in this interval not displayed.       Immune Globulin Studies     Recent Labs   Lab Test  08/15/11   1243   IGG  1160   IGG1  734   IGG2  294   IGG3  7*   IGG4  59       Metabolic Studies       Recent Labs   Lab Test  07/11/17   0328  07/10/17   1537   07/10/17   0340   07/06/17   0316   02/22/17   0643   NA  150*  148*   --   149*   < >  143   < >  133   POTASSIUM  3.8  3.9   < >  3.3*   < >  5.0   < >  4.8   CHLORIDE  120*  117*   --   116*   < >  116*   < >  100   CO2  20  22   --   21   < >  18*   < >  22   ANIONGAP  11  8   --   12   < >  9   < >  12   BUN  140*  136*   --   125*   < >  62*   < >  46*   CR  2.57*  2.54*   --   2.62*   < >  2.75*   < >  1.40*   GFRESTIMATED  26*  27*   --   26*   < >  24*   < >  53*   GLC  137*  153*   --   171*   < >  139*   < >  147*   A1C   --    --    --    --    --   Canceled, Test credited   Below Assay Range  NOTIFIED LEONARD ONEILL AT 0538 ON 7/6/17 BY CHRISSY     --    --    JACOB  8.2*  8.5   --   8.8   < >  6.9*   < >  9.0   PHOS  4.4  4.4   --   3.5   < >  5.5*   < >   --    MAG  2.3  2.3   --   2.4*   < >  2.1   < >   --    LACT  0.9   --    --   0.9   < >  1.5   < >  1.9   PCAL   --    --    --    --    --    --    --   1.76    < > = values in this interval not displayed.       Hepatic Studies    Recent Labs   Lab Test  07/11/17   0328  07/10/17   0340  07/08/17   0330   07/05/17   1810   06/28/12   1234   BILITOTAL  0.5  0.7  0.7   < >   --    < >  1.7*   BILIDELTA   --    --    --    --    --     --   0.5*   BILICONJ   --    --    --    --    --    --   0.0   DBIL  0.2   --   0.3*   < >   --    < >   --    ALKPHOS  158*  116  48   < >   --    < >  146   PROTTOTAL  5.0*  5.0*  5.0*   < >   --    < >  6.3*   ALBUMIN  1.8*  1.8*  2.0*   < >   --    < >  3.5*   AST  34  67*  29   < >   --    < >  41   ALT  27  34  15   < >   --    < >  41   LDH   --    --    --    --   289*   --    --     < > = values in this interval not displayed.       Pancreatitis testing    Recent Labs   Lab Test  07/10/17   0340   03/05/17   0358  03/03/17   1458  02/21/17   1520  12/09/16   1159   09/30/10   1734   AMYLASE   --    --   53  70   --    --    --   82   LIPASE   --    --   216   --   326  161   --   138   TRIG  177*   < >   --    --    --    --    < >   --     < > = values in this interval not displayed.       Gout Labs    No lab results found.    Hematology Studies      Recent Labs   Lab Test  07/11/17   0328  07/10/17   1537  07/10/17   0340  07/09/17   1614  07/09/17   0321  07/08/17   1649   WBC  11.7*  11.2*  10.5  9.9  7.0  6.3   ANEU  10.4*  9.9*  9.4*  8.5*  6.3  5.3   ALYM  0.2*  0.3*  0.3*  0.5*  0.1*  0.3*   ZINA  0.1  0.0  0.2  0.2  0.4  0.2   AEOS  0.1  0.1  0.0  0.2  0.1  0.2   HGB  8.5*  8.8*  8.5*  8.2*  7.8*  8.0*   HCT  27.0*  27.4*  26.5*  25.3*  24.2*  24.3*   PLT  80*  75*  71*  60*  56*  46*       Clotting Studies    Recent Labs   Lab Test  07/10/17   0340  07/08/17   0902  07/06/17   0316  07/06/17   0011   INR  1.17*  1.22*  1.80*  1.91*   PTT   --   38*  47*  48*       Iron Testing    Recent Labs   Lab Test  07/11/17   0328   07/06/17   1228   07/05/17   1810   02/08/16   1144   IRON   --    --    --    --    --    --   93   FEB   --    --    --    --    --    --   230*   IRONSAT   --    --    --    --    --    --   41   MCV  88   < >  87   < >  86   < >  92   HAPT   --    --    --    --   162   < >   --    RETP   --    --   1.6   < >   --    < >   --    RETICABSCT   --    --   39.4   < >   --    < >    --     < > = values in this interval not displayed.       Markers    Alpha Fetoprotein   Date Value Ref Range Status   02/03/2017  0 - 8 ug/L Final    <1.5  Assay Method:  Chemiluminescence using Siemens Centaur XP         Autoimmune Testing    Recent Labs   Lab Test  01/03/11   1246  02/15/10   1232   JESUS MANUEL  <1.0   --    RHF   --   10   ENASSA   --   0   ENASSB   --   0       Arterial Blood Gas Testing    Recent Labs   Lab Test  07/08/17   0902  07/06/17   2334  07/06/17   0015  07/05/17   2026  07/05/17   1810   PH  7.32*  7.26*  7.27*  7.27*  7.28*   PCO2  35  37  35  35  37   PO2  96  85  109*  155*  188*   HCO3  18*  17*  16*  16*  17*   O2PER  30  30.0  30.0  40  50.0        Thyroid Studies     Recent Labs   Lab Test  02/08/16   1144  04/11/11   1209  02/15/10   1232  07/31/09   1254   TSH  3.35  2.77  2.53  1.95   T4   --   1.23  0.90   --        Urine Studies     Recent Labs   Lab Test  07/11/17   1105  07/06/17   1441  06/06/17   1605  03/24/17   1315  03/16/17   1010   URINEPH  5.0  5.0  5.0  5.5  5.0   NITRITE  Negative  Negative  Negative  Negative  Negative   LEUKEST  Negative  Trace*  Negative  Negative  Negative   WBCU  <1  9*  1  1  5*       CSF testing     Recent Labs   Lab Test  06/11/09   0225   CWBC  1   CRBC  2   CGLU  104*   CTP  54       Medication levels    Recent Labs   Lab Test  07/11/17   0328   07/06/17   0316   VANCOMYCIN   --    --   22.1   TACROL  6.0   < >  6.3    < > = values in this interval not displayed.       Microbiology:  Beta D Glucan levels (Fungitell assay)    No lab results found.    Last Culture results with specimen source  Culture Micro   Date Value Ref Range Status   07/11/2017 Pending  Incomplete   07/11/2017 No growth after 1 hour  Final   07/11/2017 No growth after 1 hour  Final   07/05/2017 No growth  Final   07/05/2017 No growth  Final   07/05/2017 (A)  Final    Heavy growth Enterococcus faecium (VRE)  Susceptibility testing requested by Tip Anna on VRE  for   Tigecycline.7/7/17.TV.     07/05/2017 Culture negative after 5 days  Final   07/05/2017   Final    Canceled, Test credited  Incorrectly ordered by PCU/Clinic  See accession [id number] for result.     07/05/2017 (A)  Final    On day 3, isolated in broth only: Staphylococcus capitis  Critical Value/Significant Value, preliminary result only, called to and read   back by CLARA WRIGHT RN 4B  7.8.17 @ 1050 SG  Culture in progress     07/05/2017 No growth  Final    Specimen Description   Date Value Ref Range Status   07/11/2017 Sputum Endotracheal  Final   07/11/2017 Sputum Endotracheal  Final   07/11/2017 Blood Right Arm  Final   07/11/2017 Blood Arterial blood  Final   07/05/2017 Blood Brown port  Final   07/05/2017 Blood Left Brachial Arterial blood  Final   07/05/2017 Bronchoalveolar Lavage Lower Lobes bilateral  Final   07/05/2017 Bronchoalveolar Lavage Lower lobes bilateral  Final   07/05/2017 Bronchoalveolar Lavage Lower lobes bilateral  Final   07/05/2017 Bronchoalveolar Lavage Lower lobes bilateral  Final   07/05/2017 Other  Final        Last check of C difficile  C Diff Toxin B PCR   Date Value Ref Range Status   10/27/2016  NEG Final    Negative  Negative: Clostridium difficile target DNA sequences NOT detected, presumed   negative for Clostridium difficile toxin B or the number of bacteria present   may be below the limit of detection for the test.   FDA approved assay performed using [employer] GeneXpert real-time PCR.   A negative result does not exclude actual disease due to Clostridium difficile   and may be due to improper collection, handling and storage of the specimen or   the number of organisms in the specimen is below the detection limit of the   assay.         Virology:  Log IU/mL of CMVQNT   Date Value Ref Range Status   07/10/2017 2.2 (H) <2.1 [Log_IU]/mL Final   07/03/2017 <2.1 <2.1 [Log_IU]/mL Final   06/26/2017 Not Calculated <2.1 [Log_IU]/mL Final       No results found for: H6RES    No  results found for: EBQTC  No results found for: EBRES    BK viral loads No lab results found.    Parvovirus Testing  No lab results found.    Invalid input(s): PRVRES    Adenovirus Testing  No lab results found.    Invalid input(s): ADENAB, ADAG, ADENOVIRUS, ADQT    Imaging:  Recent Results (from the past 48 hour(s))   XR Chest Port 1 View    Narrative    Exam: Chest x-ray  7/9/2017 5:13 PM      History: Right pleural drain    Comparison: Radiograph same date    Findings: Stable positioning of endotracheal tube, right-sided central  catheter, and enteric tube. New right-sided chest tube. Significantly  decreased right-sided pleural effusion and right pulmonary opacities.  Unchanged mild left basilar opacities. No pneumothorax. Cardiac  silhouette is within normal limits. Trachea is midline. Surgical clips  project over the upper abdomen.      Impression    Impression:   1. Significantly decreased right pleural effusion and improved right  pulmonary opacities following placement of right-sided chest tube.  2. Unchanged mild left basilar opacities.    I have personally reviewed the examination and initial interpretation  and I agree with the findings.    BLANCA HUYNH MD   XR Chest Port 1 View    Narrative    EXAM: XR CHEST PORT 1 VW  7/10/2017 1:40 AM     HISTORY:  intubated       COMPARISON: 7/9/2017    FINDINGS: ET tube tip over mid thoracic trachea. Right IJ central line  tip terminates over the mid SVC. Right basilar chest tube is in place  and unchanged. Gastric tube tip projects below the diaphragm with  limited visualization of the tip and sidehole.    Stable cardiac silhouette. Stable right pleural effusion. Persistent  perihilar and bibasilar opacities with dense retrocardiac opacity. Low  lung volumes. No pneumothorax.       Impression    IMPRESSION: Stable perihilar/bibasilar opacities and right pleural  effusion.    I have personally reviewed the examination and initial interpretation  and I agree  with the findings.    DIAZ NORMAN MD   XR Abdomen Port 1 View    Narrative    XR ABDOMEN PORT F1 VW 7/10/2017 3:28 PM    History: Verify small bowel feeding tube bedside placement.    Comparison: Abdominal x-ray on 7/8/2017.    Findings: Single view of the abdomen was obtained. Interval placement  of feeding tube with the distal tip projects over the proximal  jejunum. Enteric tube projects over the stomach. Prominent small bowel  gas pattern. Partial visualization of surgical clips and drain.      Impression    Impression: Feeding tube distal tip projects over proximal jejunum.   There is a mildly prominent loop of small bowel, limited view.  If  concerned for possible obstruction or ileus, recommend additional  complete abdominal radiographs.  Additionally, recommend short term  follow up radiograph.     I have personally reviewed the examination and initial interpretation  and I agree with the findings.    DALLIN RENAE MD   XR Chest Port 1 View    Narrative    EXAM: XR CHEST PORT 1 VW  7/11/2017 1:45 AM     HISTORY:  intubated       COMPARISON: 7/10/2017    FINDINGS: Stable interval tube, right central line, enteric tubes and  right basilar chest tube.     Unchanged myocardial likely. Stable perihilar and bibasilar pulmonary  opacities. Small right greater than left pleural effusions. No  pneumothorax.      Impression    IMPRESSION: Stable perihilar and bibasilar opacities and right greater  than left pleural effusion. Findings may represent edema or infection.    I have personally reviewed the examination and initial interpretation  and I agree with the findings.    DALLIN RENAE MD

## 2017-07-11 NOTE — PROGRESS NOTES
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     Graft Function: Good function. US liver normal doppler. TB/ALT/AST WNL, Alk phos elevated. Check ggt.   Immunosuppression Management:   CellCept 250 mg BID. Held since 6/27/17 due to neutropenia. Continue to hold.   Tac goal level 5-8. 7/7 level 6 (7.5 hour trough). Since MMF held will aim for goal ~8.  Increase Prograf 4 mg BID. Repeat tac level tomorrow for 12 hour trough.   Cardiorespiratory: Intubated for ex lap, initially requiring pressor support. HDS. Moderate right sided pleural effusion. Chest tube placed 7/9, minimal output over past 24 hrs. Would change to water seal then remove.  Vent management per SICU team. Continuing PS trials, neuro status limiting.   Hematology: Pancytopenia, acute on chronic, secondary to medications. MMF and bactrim held (since 6/27). Valcyte held on admission. Neupogen 480 mcg given on 7/4 to 7/7,  CMV seronegative and donor seropositive. 7/3 CMV PCR < 137. 7/10 CMV . Continue to hold MMF, bactrim and valcyte.   GI: CT from [place] demonstrating right sided colonic inflammation, AXR on admission with dilated loop of bowel in central abdomen, felt likely to sigmoid. Initial impression consistent with Typhlitis however given worsening abdominal pain on examination, hypotension and increasing lactate levels, elected to proceed with exploratory laparotomy. Findings on reexploration demonstrating persistent inflammation of the right colon without evidence of ischemia. Lactic acid normalized.  NJ placed TF Peptamen 20 cc/hr.    Fluid/Electrolytes/Renal:  EJ, secondary to hypoperfusion, sepsis. Cr 2.6 (2.5). . UO 4.6 L yesterday. Lasix now held. Nephrology consulting. Hyperkalemia, PTA on florinef. K today 3.8. Na 150, would replace free water per NJ.  Endocrine: DM II, on insulin gtt.   Infectious disease: Tmax 101, WBC 11.7. Pan cx (urine, blood, sputum). Continue ertapenem. ID consulted. Recommend imaging to eval for abscess.    -Started on Zosyn, Vancomycin, Flagyl, Micafungin on admission (7/4/2017). 7/5 Blood cultures, no growth thus far. Abdominal fluid culture collected intraoperatively, gram stain with no organisms, fungal and bacterial culture, GPCs. Bronchoalveolar lavage growing VRE, colonization. Tx ID consulted recommending continue Ertapenem and evaluate for abscess given fever yesterday. Will continue ertapenem. Hold on CT scan for now, per surgeon would likely not seen evidence of abscess this soon.   Neuro: Delirium, hold, limit medications that have CNS SE. Oxycodone PRN pain. If not improved in next days will consult Neurology.  Access: R IJ, R radial arterial line  Prophylaxis: PPI, DVT-SCD  Disposition: SICU    Medical Decision Making: Medium  Admit [id number] (moderate level decision making)    PILLO/Fellow/Resident Provider: Ada Driver PA-C    Faculty: Chaparro Anderson M.D.      __________________________________________________________________  Transplant History:.  3/4/2017 DD OLT with Dr. Tran (Liver), Postoperative day: 129     Interval History: History is obtained from EMR and nursing staff.  Overnight events: Did not tolerate PS trial.     ROS:   A 10-point review of systems was negative except as noted above.    Meds:    heparin  5,000 Units Subcutaneous Q8H     senna-docusate  1 tablet Oral At Bedtime     polyethylene glycol  17 g Oral Daily     lidocaine (viscous)  5 mL Topical Once     pantoprazole  40 mg Oral or Feeding Tube Daily     ertapenem (INVanz) IV  1 g Intravenous Q24H     lipids   250 mL Intravenous Q24H     fludrocortisone  0.1 mg Oral Daily     sodium chloride (PF)  3 mL Intracatheter Q8H     tacrolimus  3 mg Oral QPM     tacrolimus  4 mg Oral QAM       Physical Exam:     Admit Weight: 94.2 kg (207 lb 11.2 oz) (SCALE 2)    Current vitals:   /76  Pulse 101  Temp 99.6  F (37.6  C) (Axillary)  Resp 29  Ht 1.829 m (6')  Wt 93.4 kg (205 lb 14.6 oz)  SpO2 98%  BMI 27.93 kg/m2         Vital sign ranges:    Temp:  [98.5  F (36.9  C)-101.8  F (38.8  C)] 99.6  F (37.6  C)  Heart Rate:  [] 98  Resp:  [25-33] 29  MAP:  [75 mmHg-120 mmHg] 119 mmHg  Arterial Line BP: (112-183)/(53-82) 181/82  FiO2 (%):  [30 %] 30 %  SpO2:  [93 %-99 %] 98 %  Patient Vitals for the past 24 hrs:   Temp Temp src Heart Rate Resp SpO2 Weight   07/11/17 1200 - - 98 - - -   07/11/17 0900 - - 97 - 98 % -   07/11/17 0818 - - - - 93 % -   07/11/17 0800 99.6  F (37.6  C) Axillary 98 29 98 % -   07/11/17 0700 - - - - 99 % -   07/11/17 0600 - - 86 30 99 % -   07/11/17 0500 - - 85 - 97 % -   07/11/17 0400 99.8  F (37.7  C) Axillary 89 26 97 % 93.4 kg (205 lb 14.6 oz)   07/11/17 0300 - - 90 - 98 % -   07/11/17 0200 - - 93 27 97 % -   07/11/17 0100 - - 95 - 96 % -   07/11/17 0000 101.3  F (38.5  C) Axillary 101 (!) 33 95 % -   07/10/17 2300 - - 92 - 98 % -   07/10/17 2200 - - 89 28 98 % -   07/10/17 2100 - - 98 - 97 % -   07/10/17 2000 101.8  F (38.8  C) Axillary 98 26 96 % -   07/10/17 1900 - - 98 - 96 % -   07/10/17 1800 - - 94 - 97 % -   07/10/17 1700 - - 93 - 98 % -   07/10/17 1600 98.5  F (36.9  C) Axillary 89 25 97 % -   07/10/17 1500 - - 91 - 96 % -   07/10/17 1400 - - 92 - 97 % -     General Appearance: NAD  Skin: normal, warm, dry  Heart: ST  Lungs: Vented  Abdomen: The abdomen is soft, midline incision is c/d/i and covered. Chevron incision well healed.   : vazquez is present, yellow urine in bag.   Extremities:BLE trace edema.  Neurologic: moving extremities, respond to stimuli. Restless.     Data:    CMP    Recent Labs  Lab 07/11/17  0328 07/10/17  1537  07/10/17  0340  07/06/17  0741  07/05/17  0859   * 148*  --  149*  < >  --   < > 137   POTASSIUM 3.8 3.9  < > 3.3*  < > 4.9  < > 6.0*   CHLORIDE 120* 117*  --  116*  < >  --   < >  --    CO2 20 22  --  21  < >  --   < >  --    * 153*  --  171*  < >  --   < > 316*   * 136*  --  125*  < >  --   < >  --    CR 2.57* 2.54*  --  2.62*  < >  --   < >  --    GFRESTIMATED 26* 27*  --  26*  < >  --   < >  --    GFRESTBLACK 32* 32*  --  31*  < >  --   < >  --    JACOB 8.2* 8.5  --  8.8  < >  --   < >  --    ICAW  --   --   --   --   --  4.2*  --  4.4   MAG 2.3 2.3  --  2.4*  < >  --   < >  --    PHOS 4.4 4.4  --  3.5  < >  --   < >  --    ALBUMIN 1.8*  --   --  1.8*  < >  --   < >  --    BILITOTAL 0.5  --   --  0.7  < >  --   < >  --    ALKPHOS 158*  --   --  116  < >  --   < >  --    AST 34  --   --  67*  < >  --   < >  --    ALT 27  --   --  34  < >  --   < >  --    < > = values in this interval not displayed.  CBC    Recent Labs  Lab 07/11/17  0328 07/10/17  1537  07/06/17  0316   HGB 8.5* 8.8*  < > 7.1*   WBC 11.7* 11.2*  < > 0.8*   PLT 80* 75*  < > 27*   A1C  --   --   --  Canceled, Test credited Below Assay RangeNOTIFIED LEONARD ONEILL AT 0538 ON 7/6/17 BY [facility]   < > = values in this interval not displayed.  COAGS    Recent Labs  Lab 07/10/17  0340 07/08/17  0902 07/06/17  0316   INR 1.17* 1.22* 1.80*   PTT  --  38* 47*      Urinalysis  Recent Labs   Lab Test  07/11/17   1105  07/06/17   1441  06/14/17   1508   04/11/16   1345   COLOR  Yellow  Yellow   --    < >  Yellow   APPEARANCE  Clear  Cloudy   --    < >  Clear   URINEGLC  Negative  Negative   --    < >  30*   URINEBILI  Negative  Negative   --    < >  Negative   URINEKETONE  Negative  Negative   --    < >  Negative   SG  1.009  1.014   --    < >  1.016   UBLD  Negative  Moderate*   --    < >  Small*   URINEPH  5.0  5.0   --    < >  5.0   PROTEIN  10*  30*   --    < >  30*   NITRITE  Negative   Negative   --    < >  Negative   LEUKEST  Negative  Trace*   --    < >  Negative   RBCU  <1  >182*   --    < >  1   WBCU  <1  9*   --    < >  1   UTPG   --    --   1.55*   --   0.41*    < > = values in this interval not displayed.       Cultures:   Blood Cultures x2 7/4/2017 NGTD     Abdominal Fluid Culture 7/4/2017: NGTD    Abdominal Fluid Culture 7/5/17: NGTD    Bronchoalveolar Culture 7/5/17: VRE.     Sputum culture 7/11/17: sent    CT scan:    7/4/2017 CONCLUSION:   1. Right colonic wall thickening suggesting colitis. Follow-up is necessary to confirm resolution in order to exclude colonic mass.  2. Transverse colon is not well distended reducing evaluation for wall thickening.  3. Small amount of ascites.  4. Splenomegaly.  5. Prominent portal and splenic veins. If confirmation of vascular patency is indicated consider follow-up ultrasound of the liver with Doppler.  6. Small right pleural effusion.  7. Stranding in the subcutaneous fat of the ventral pelvis.     Abdominal XR 7/4/2017:   1. No evidence of pneumoperitoneum.  2. Dilated loop of bowel in the central abdomen, likely sigmoid.    XR Chest Portable 1 View 7/4/17:  1. Endotracheal tube tip projects 5.3 cm from the chacorta.  2. Increased bilateral pleural effusions, right greater than left.  3. Unchanged bibasilar patchy opacities.Attestation:    The patient has been seen and evaluated by me.   Vital signs, labs, medications and orders were reviewed.   When obtained, diagnostic images were reviewed by me and interpreted as above.    The care plan was discussed with the multidisciplinary team and I agree with the findings and plan in this note, with any differences recorded in blue.    Immunosuppressive medication management was reviewed and adjusted as reflected in the note and orders.     .

## 2017-07-11 NOTE — PLAN OF CARE
Problem: Goal Outcome Summary  Goal: Goal Outcome Summary  Outcome: No Change  D/A/I. Pt intubated s/p ex lap for neutropenic colitis. Intubated, tolerating PS trials. Not following commands or arousing. Insulin gtt at 4units/hr. FT placed today, started at 10mL/hr. Chest tube to wall suction, lasix given per order. Last CVP was 7. TPN at 65mL/hr. Bilateral wrist restraints continued for safety.  P. Cont to monitor neuro status, notify MD with any changes or concerns.

## 2017-07-11 NOTE — PLAN OF CARE
Problem: Goal Outcome Summary  Goal: Goal Outcome Summary  Outcome: No Change  D/A/I. Pt intubated s/p ex lap for neutropenic colitis. Intubated, did not tolerate PS trial this morning due to tachypnea, destaturation. Alert, but not following commands. Moves all extremities. Insulin gtt at 8units/hr. TF increased to 20mL/hr today, TPN dose decreased. Chest tube to water seal. Last CVP was 5. TPN at 65mL/hr. Large amounts of thick secretions suctioned from ETT. Febrile today at 101.7, BC sent, UA sent, sputum culture sent. Tylenol given. Bilateral wrist restraints continued for safety. Vitals stable. Last sodium 150, D5W increased today to 150mL/hr.  P. Cont to monitor neuro status, notify MD with any changes or concerns

## 2017-07-11 NOTE — PLAN OF CARE
Problem: Goal Outcome Summary  Goal: Goal Outcome Summary  Outcome: No Change  D: Tmax 101.8. Sinus rhythm. Normotensive with occasional hypertension. On minimal CMV settings, 30%, rate 22, 500, peep 5.   I/A: Opens eyes spontaneously. Not following commands. Purposeful movements. Agitation with cares. Also having hypertension with cares, sbp 170-190. MD aware of hypertension. MD also aware of fever. 650mg tylenol given. 5mg PRN oxy given x2 this shift.  Adequate UOP, ranging 110-160cc/hr. No BM this shift. 20K replaced this am. 10cc output of chest tube entire shift. TF at 10cc/hr. TPN and lipids infusing. NG to LIS. Continues on insulin gtt and D5 @50cc/hr. PST this am for approximately 30-45min.   P: Continue to monitor pt. Consult SICU team with any changes/concerns.

## 2017-07-11 NOTE — PROGRESS NOTES
[facility], [place]    Surgical Intensive Care Unit (SICU) Service  Progress Note    Date of Service (when I saw the patient): 07/11/2017     Assessment & Plan   Camacho Bhagat is a 51 year old male who was admitted on 7/4/2017 with abdominal pain and fever.  He was found to have neutropenic colitis.  He underwent liver transplant March 2017 for CASTAÑEDA.      7/4/2017                    Exploratory laparotomy due to concern for perforation, viable hepatic flexure   7/5/2017                    Abdominal washout and closure  7/5/2017                    Left axillary arterial line, bronchoscopy and BAL   7/9/2017                    Right pleural pigtail catheter       Major Plans for Today:  - increase TF to 20, continue TPN  - start free H2O via NG  - increase D5  - start DVT prophylaxis  - R pigtail to water seal  - RUE arterial doppler, + distal right radial artery occlusion; vascular surgery consultation   - neurology consult     Assessment/Plan:     PROBLEM LIST:  # neutopenic colitis s/p ex lap  # septic shock  # toxic metabolic encephalopathy  # acute hypoxic respiratory failure  # right pleural effusion s/p pigtail  # EJ  # volume overload  # hypernatremia  # pancytopenia  # acute blood loss anemia  # protein calorie malnutrition  # DM type 2  # CASTAÑEDA s/p liver transplant (3/2017)  # GERD  # fibromyalgia  # DVT  # IVC filter  # RONNIE    HEMODYNAMICS/CV:  - HR: 80-90; SBP: 130-160; MAP: > 80  - lactate 0.9, no further need to follow  - no vasopressors    NEUROLOGIC:  - pain: Tylenol 650 mg PO Q4H PRN  - no sedation   - will stop Oxycodone and Fentanyl to ensure not confounding mental exam  - neurology consultation     PULMONARY:  - remains intubated, mental status not allowing for liberation  - CMV 22, 440, PEEP 5, 0.30  - R pigtail in place, minimal output; will place to water seal; f/u CXR, if negative will plan to remove    RENAL:  - I/O: negative 1.3 L yesterday; + 300 overnight  -  UOP: 4.6 L yesterday; 1.1 L overnight  - nephrology consulted and following, hold off dialysis now  - will increase D5 to 100 mL/hr; start free water via feeding tube  - continue to follow Na    ID:  - Tmax: 101.8  - WBC: 11.7  - continue ertapenem  - ID consulted and following; recommend holding off treating VRE in sputum   - pan cultured 7/11  - hold on CT scan at this time per surgical team  - follow weekly CMV PCR while off valcyte    HEME:  - Hgb: 8.5  - Plts: 80  - no active bleeding  - pancytopenia acute on chronic, likely secondary to medications, MMF and bactrim on hold since 6/27, valcyte held on admission  - Neupogen 480 mcg given 7/4-7/7   - + distal right radial artery occlusion, vascular surgery consulted    VASCULAR ACCESS:  - R IJ CVC  - L axillary art line  - PIV    GI/NUTRITION:  - on TPN/lipids  - started bowel regimen  - on TF, increase to 20, no advancement until bowel function    ENDOCRINE:  - continue home fludrocortison 0.1 mg daily  - continue insulin gtt    MSK:  - PT/OT  - mobilize    SKIN:  - local wound cares    PROPHYLAXIS:  - DVT: heparin 5000 TID  - GI: pantoprazole 40     CODE STATUS:  - FULL CODE    DISPOSITION:  - SICU    GENERAL CARES  DVT Prophylaxis: Heparin SQ  GI Prophylaxis: PPI  Restraints: Restraints for medical healing needed: YES  Family update by me today: Yes   Current lines are required for patient management    LAMIN Lauren, CNP    Time Spent on this Encounter   Billing:  I spent 45 minutes bedside and on the inpatient unit today managing the critical care of Camacho Bhagat in relation to the issues listed in this note.    Main Plans for Today   - see above    Interval History   No acute events noted overnight.  Remains intubated.      Physical Exam   Temp: 101.3  F (38.5  C) Temp src: Axillary Temp  Min: 98.5  F (36.9  C)  Max: 101.8  F (38.8  C)     Heart Rate: 99 Resp: 28 SpO2: 96 % O2 Device: Mechanical Ventilator    Vitals:    07/09/17 0400 07/10/17 0600  07/11/17 0400   Weight: 99.9 kg (220 lb 3.8 oz) 96.3 kg (212 lb 4.9 oz) 93.4 kg (205 lb 14.6 oz)     I/O last 3 completed shifts:  In: 4352.34 [I.V.:1921.59; NG/GT:270]  Out: 3655 [Urine:3550; Emesis/NG output:75; Chest Tube:30]    GEN: no acute distress  EYES: PERRL, Anicteric sclera.    HEENT:  Normocephalic, atraumatic, trachea midline, ETT secure  CV: RRR, no gallops, rubs, or murmurs  PULM/CHEST: Clear breath sounds bilaterally without rhonchi, crackles or wheeze, symmetric chest rise  GI: normal bowel sounds, soft, non-tender, no rebound tenderness or guarding, no masses  : vazquez catheter in place, urine yellow and clear, noted scrotal edema  EXTREMITIES: generalized peripheral edema, moving all extremities, peripheral pulses intact  NEURO: Cranial nerves II-XII grossly intact, no motor-sensory deficits noted  SKIN: No rashes, sores or ulcerations  Imaging personally reviewed:  ECG    Medications     parenteral nutrition - ADULT compounded formula       D5W 100 mL/hr at 07/11/17 0834     parenteral nutrition - ADULT compounded formula 65 mL/hr at 07/10/17 2027     IV fluid REPLACEMENT ONLY       insulin (regular) 7 Units/hr (07/11/17 1200)       heparin  5,000 Units Subcutaneous Q8H     senna-docusate  1 tablet Oral At Bedtime     polyethylene glycol  17 g Oral Daily     tacrolimus  4 mg Oral BID IS     [START ON 7/12/2017] lipids  250 mL Intravenous Once per day on Mon Wed Fri     lidocaine (viscous)  5 mL Topical Once     pantoprazole  40 mg Oral or Feeding Tube Daily     ertapenem (INVanz) IV  1 g Intravenous Q24H     fludrocortisone  0.1 mg Oral Daily     sodium chloride (PF)  3 mL Intracatheter Q8H       Data     Recent Labs  Lab 07/11/17  0328 07/10/17  1537 07/10/17  0823 07/10/17  0340  07/08/17  0902  07/06/17  0316   WBC 11.7* 11.2*  --  10.5  < >  --   < > 0.8*   HGB 8.5* 8.8*  --  8.5*  < >  --   < > 7.1*   MCV 88 89  --  88  < >  --   < > 87   PLT 80* 75*  --  71*  < >  --   < > 27*   INR  --    --   --  1.17*  --  1.22*  --  1.80*   * 148*  --  149*  < >  --   < > 143   POTASSIUM 3.8 3.9 4.0 3.3*  < >  --   < > 5.0   CHLORIDE 120* 117*  --  116*  < >  --   < > 116*   CO2 20 22  --  21  < >  --   < > 18*   * 136*  --  125*  < >  --   < > 62*   CR 2.57* 2.54*  --  2.62*  < >  --   < > 2.75*   ANIONGAP 11 8  --  12  < >  --   < > 9   JACOB 8.2* 8.5  --  8.8  < >  --   < > 6.9*   * 153*  --  171*  < >  --   < > 139*   ALBUMIN 1.8*  --   --  1.8*  --   --   < >  --    PROTTOTAL 5.0*  --   --  5.0*  --   --   < >  --    BILITOTAL 0.5  --   --  0.7  --   --   < >  --    ALKPHOS 158*  --   --  116  --   --   < >  --    ALT 27  --   --  34  --   --   < >  --    AST 34  --   --  67*  --   --   < >  --    < > = values in this interval not displayed.  Recent Results (from the past 24 hour(s))   XR Abdomen Port 1 View    Narrative    XR ABDOMEN PORT F1 VW 7/10/2017 3:28 PM    History: Verify small bowel feeding tube bedside placement.    Comparison: Abdominal x-ray on 7/8/2017.    Findings: Single view of the abdomen was obtained. Interval placement  of feeding tube with the distal tip projects over the proximal  jejunum. Enteric tube projects over the stomach. Prominent small bowel  gas pattern. Partial visualization of surgical clips and drain.      Impression    Impression: Feeding tube distal tip projects over proximal jejunum.   There is a mildly prominent loop of small bowel, limited view.  If  concerned for possible obstruction or ileus, recommend additional  complete abdominal radiographs.  Additionally, recommend short term  follow up radiograph.     I have personally reviewed the examination and initial interpretation  and I agree with the findings.    DALLIN RENAE MD   XR Chest Port 1 View    Narrative    EXAM: XR CHEST PORT 1 VW  7/11/2017 1:45 AM     HISTORY:  intubated       COMPARISON: 7/10/2017    FINDINGS: Stable interval tube, right central line, enteric tubes and  right  basilar chest tube.     Unchanged myocardial likely. Stable perihilar and bibasilar pulmonary  opacities. Small right greater than left pleural effusions. No  pneumothorax.      Impression    IMPRESSION: Stable perihilar and bibasilar opacities and right greater  than left pleural effusion. Findings may represent edema or infection.    I have personally reviewed the examination and initial interpretation  and I agree with the findings.    DALLIN RENAE MD

## 2017-07-11 NOTE — PLAN OF CARE
Problem: Goal Outcome Summary  Goal: Goal Outcome Summary  OT 4A: OT eval completed and POC established. Pt assists with donning socks by moving BLE to command, requires max A.  Pt instructed in rolling to don sling for dependent lift transfer to chair; pt requires total A to roll bilaterally. Verbal and tactile cues provided in normal sequence of movement with 25% command follow from pt noted.  Utilized sling for transfer to chair. Recommend continued rehab.

## 2017-07-11 NOTE — CONSULTS
VASCULAR SURGERY CONSULTATION NOTE    HPI: Mr. Bhagat is a 51- year old male who was admitted to [facility] on 7/4/2017 for abdominal pain and fever. Work-up found neutropenic colitis and he underwent exploratory laparotomy. Vascular Surgery is consulted today out of concern for distal right radial artery occlusion revealed on ultrasound today.     REFERRAL SOURCE: LAMIN Lauren    PAST MEDICAL HISTORY:  Past Medical History:   Diagnosis Date     Cancer (H)      Depressive disorder, not elsewhere classified      Esophageal reflux      Fibromyalgia 1/2009    dx with Dr Benitez( Rheum)     History of thrombophlebitis      Osteoarthritis      Other acute embolism veins 11/01    Deep vein thrombophlebitis, filter placed     Other and unspecified hyperlipidemia      Other chronic nonalcoholic liver disease     Fatty liver      Other testicular hypofunction      Pneumonia, organism 10-01    Included ARDS, sepsis, and  acute renal failure; hospitalized     Rheumatoid arthritis(714.0)      Type II or unspecified type diabetes mellitus without mention of complication, not stated as uncontrolled 11/01    Managed by endocrinology     Unspecified essential hypertension 11/01    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     Unspecified sleep apnea     Uses BiPAP       PAST SURGICAL HISTORY:  Past Surgical History:   Procedure Laterality Date     BENCH LIVER N/A 3/4/2017    Procedure: BENCH LIVER;  Surgeon: Jovan Tran MD;  Location: [facility] OR     C NONSPECIFIC PROCEDURE      tracheostomy     C NONSPECIFIC PROCEDURE      repair of deviated septum     C NONSPECIFIC PROCEDURE  2007    Rt knee arthroscopy     C TOTAL KNEE ARTHROPLASTY  2008    Right knee arthroscopy     CHOLECYSTECTOMY       ESOPHAGOSCOPY, GASTROSCOPY, DUODENOSCOPY (EGD), COMBINED N/A 8/4/2016    Procedure: COMBINED ESOPHAGOSCOPY, GASTROSCOPY, DUODENOSCOPY (EGD), BIOPSY SINGLE OR MULTIPLE;  Surgeon: Trent Pederson MD;  Location:  GI     LAPAROTOMY  EXPLORATORY N/A 2017    Procedure: LAPAROTOMY EXPLORATORY;  Exploratory Laparotomy, washout;  Surgeon: Tip Zhang MD;  Location: [facility] OR     LAPAROTOMY EXPLORATORY N/A 2017    Procedure: LAPAROTOMY EXPLORATORY;  Exploratory Laparotomy, Washout with closure.;  Surgeon: Tip Zhang MD;  Location: [facility] OR     TRANSPLANT LIVER RECIPIENT  DONOR N/A 3/4/2017    Procedure: TRANSPLANT LIVER RECIPIENT  DONOR;  Surgeon: Jovan Tran MD;  Location: [facility] OR       FAMILY HISTORY:  Family History   Problem Relation Age of Onset     DIABETES Father      Hypertension Father      Substance Abuse Father      Arthritis Mother      CANCER Mother      Thyroid     Thyroid Disease Mother      Other Cancer Mother      Colon Cancer No family hx of      Hyperlipidemia No family hx of      Coronary Artery Disease No family hx of      CEREBROVASCULAR DISEASE No family hx of      Breast Cancer No family hx of      Prostate Cancer No family hx of        SOCIAL HISTORY:   Social History   Substance Use Topics     Smoking status: Former Smoker     Smokeless tobacco: Former User     Types: Chew     Quit date: 10/8/2015      Comment: Has used chewing tobacco since age 15 , chewed for 20yrs      Alcohol use No      Comment: last drink about a year ago (quit )       TOBACCO USE:  Quit       FUNCTIONAL CAPACITY: Currently in intensive care.     HOME MEDICATIONS:  Prior to Admission medications    Medication Sig Start Date End Date Taking? Authorizing Provider   gabapentin (NEURONTIN) 300 MG capsule Take 1 capsule (300 mg) by mouth 3 times daily 17   Toñito Camejo MD   amLODIPine (NORVASC) 5 MG tablet Take 1 tablet (5 mg) by mouth daily 17   Toñito Camejo MD   tacrolimus (PROGRAF - GENERIC EQUIVALENT) 1 MG capsule Take 4capsules (4mg) in the morning and 3 capsules (3 mg) in the evening. Take every 12 hours. 17   Toñito Camejo MD   mycophenolate (CELLCEPT - GENERIC EQUIVALENT) 250 MG  capsule Take 1 capsule (250 mg) by mouth every 12 hours 6/20/17   Toñito Camejo MD   sulfamethoxazole-trimethoprim (BACTRIM/SEPTRA) 400-80 MG per tablet Take 1 tablet on Monday and take 1 tablet on Thursday 6/15/17   Toñito Camejo MD   fludrocortisone (FLORINEF) 0.1 MG tablet Take 1 tablet (0.1 mg) by mouth daily For elevated potassium 6/14/17   Toñito Camejo MD   oxyCODONE (ROXICODONE) 10 MG IR tablet Take 1 tablet (10 mg) by mouth daily as needed for moderate to severe pain 6/9/17   Shay Kirkpatrick MD   clotrimazole (MYCELEX) 10 MG LOZG lozenge Place 1 lozenge (1 Beatrice) inside cheek 4 times daily 5/5/17   Toñito Camejo MD   Linagliptin (TRADJENTA PO) Take 5 mg by mouth    Reported, Patient   tadalafil (CIALIS) 5 MG tablet Take 1 tablet (5 mg) by mouth daily Never use with nitroglycerin, terazosin or doxazosin. 5/4/17   Toñito Rivas MD   tadalafil (CIALIS) 5 MG tablet Take 1 tablet (5 mg) by mouth daily Never use with nitroglycerin, terazosin or doxazosin. 5/4/17   Toñito Rivas MD   cyclobenzaprine (FLEXERIL) 10 MG tablet Take 1 tablet (10 mg) by mouth 3 times daily as needed for muscle spasms 4/28/17   Toñito Camejo MD   omeprazole (PRILOSEC) 20 MG CR capsule Take 1 capsule (20 mg) by mouth 2 times daily (before meals) 4/26/17   Toñito Camejo MD   magnesium oxide (MAG-OX) 400 MG tablet Take 1 tablet (400 mg) by mouth 2 times daily 3/26/17   Adonay Cross MD   lidocaine (LIDODERM) 5 % Patch Place 1 patch onto the skin every 24 hours  Patient not taking: Reported on 6/7/2017 3/13/17   Dora Elizabeth NP   LACTOBACILLUS EXTRA STRENGTH CAPS Take 1 capsule by mouth 2 times daily 3/13/17   Dora Elizabeth NP   prochlorperazine (COMPAZINE) 5 MG tablet Take 1-2 tablets (5-10 mg) by mouth every 6 hours as needed for nausea or vomiting  Patient not taking: Reported on 6/7/2017 3/13/17   Dora Elizabeth, NP   oxyCODONE (ROXICODONE) 5 MG IR tablet Take 1-2 tablets (5-10 mg) by mouth every  4 hours as needed for moderate to severe pain 3/10/17   Ada Driver PA-C   aspirin 325 MG tablet 1 tablet (325 mg) by Oral or Feeding Tube route daily 3/10/17   Ada Driver PA-C   insulin aspart (NOVOLOG PEN) 100 UNIT/ML injection DOSE:  1 units per 8 grams of carbohydrate. With meals and snacks. Only chart total amount of units given.  Do not give if pre-prandial glucose is less than 60 mg/dL. 3/10/17   Ada Driver PA-C   insulin glargine (LANTUS) 100 UNIT/ML injection Inject 45 Units Subcutaneous every 24 hours  Patient taking differently: Inject 50 Units Subcutaneous every 24 hours  3/10/17   Ada Driver PA-C   valGANciclovir (VALCYTE) 450 MG tablet Take 1 tablet (450 mg) by mouth daily 3/10/17   Ada Driver PA-C   multivitamin, therapeutic with minerals (THERA-VIT-M) TABS tablet Take 1 tablet by mouth daily 3/10/17   Ada Driver PA-C   ondansetron (ZOFRAN-ODT) 4 MG ODT tab Take 1 tablet (4 mg) by mouth every 8 hours as needed for nausea 3/10/17   Ada Driver PA-C   glucagon 1 MG SOLR injection Inject 1 mg Subcutaneous every 15 minutes as needed for low blood sugar (May repeat x 1 only) 12/1/16   Godwin Willson MD       VITAL SIGNS:  Temp: 101.3  F (38.5  C) Temp src: Axillary     Heart Rate: 99 Resp: 28 SpO2: 96 % O2 Device: Mechanical Ventilator      205 lbs 14.55 oz    PHYSICAL EXAM:  NEURO/PSYCH:  The patient is alert. Moves all extremities.   SKIN: Color is appropriate for race, warm, dry.  PULMONARY:  Oral intubation, requiring mechanical ventilation.   EXTREMITIES: Right index finger black, CMS right hand appears to be intact. Palpable right ulnar pulse; right radial signal likely from ulnar; signals audible in palmar arch and digits.       7/11/2017 RIGHT UPPER EXTREMITY DUPLEX ULTRASOUND:  FINDINGS:      Peak systolic velocities:     Right Upper Extremity Arteries:      Subclavian: 157 cm/sec  Axillary artery: 119 cm/sec     Brachial proximal: 164  cm/sec   Brachial mid: 187 cm/sec  Brachial antecubital: 157 cm/sec     Radial artery origin: 102 cm/sec  Radial artery mid: 47 cm/sec  Radial artery distal (proximal to branch): 34 cm/sec  Radial artery distal (distal to branch): Occluded  Radial artery branch and distal forearm: 86 cm/s     Ulnar artery proximal: 146 cm/sec     Waveforms are triphasic throughout.     IMPRESSION:  The radial artery is occluded from the distal forearm to the wrist,  and terminates in distal forearm branches. All other examined arteries  of the right upper extremity are patent without stenosis, including  the ulnar artery.    ASSESSMENT/PLAN:  #1 Right radial artery occlusion, distal, 7/11/2017  - appears he had an arterial line in the right radial artery   - right hand does not appear acutely threatened at this time and does not require urgent/emergent intervention  - discoloration of right index finger could also have been from vasopressor requirement earlier this admission; toes also have appears of microvascular injury due to pressors   - Dr. Daley discussed with Dr. Robin and Heparin is likely not necessary at this time; would likely benefit from an aspirin once okay with other teams    ANTI-PLATELET: Not currently on Aspirin due to pancytopenia  ANTI-COAGULANT: Not currently on anticoagulation and likely not necessary per Dr. Robin   STATIN: Not warranted  DIABETES MEDICATIONS: On insulin at home  TOBACCO CESSATION: Quit 2015    Please contact Dr. Robin's Vascular Surgery Service with questions or concerns.    LAMIN Rockwell, CNP  Division of Vascular Surgery  [facility]  Pager: [phone]

## 2017-07-11 NOTE — PROGRESS NOTES
CLINICAL NUTRITION SERVICES - REASSESSMENT NOTE     Nutrition Prescription    RECOMMENDATIONS FOR MDs/PROVIDERS TO ORDER:  Adjust CPN to the following (to be paired with TF @ 20-30 ml/hr and D5W infusion): new goal @ 45 ml/hr (1080 ml/day) with 300g Dex daily, 100 g AA daily and decrease to 250 ml 20% IV lipids every M/W/F (x3 d/wk) = 1634 kcals/day (18 kcal/kg/day or 52% of MREE + pending TF kcal contributions + pending D5W kcal contributions), 1.1 g PRO/kg/day (+ pending TF PRO), GIR 2.2 with 13% kcals from Fat.  Micro/Rx: same.     Malnutrition Status:    (at least) Non-severe malnutrition in the context of acute on chronic illness.    Recommendations already ordered by Registered Dietitian (RD):  1.  Order TG recheck for tomorrow AM (will have IV lipids off tonight to tomorrow AM with above changes) to have accurate value and evaluate ongoing approp of CPN dex/kcal/lipid provisions.    2.  Increased to Peptamen 1.5 @ 20 ml/hr (480 ml/day) = 720 kcals, 33 g PRO    Future/Additional Recommendations:  1.  Advance TF rate by 10 ml per MD discretion to goal Peptamen 1.5 @ goal 80 ml/hr (1920 ml/day) to provide 2880 kcals (92% of MREe or 31 kcal/kg/day), 131 g PRO (1.4 g/kg/day), 1478 ml free H2O, 108 g Fat (70% from MCTs), 361 g CHO and no Fiber daily.      2.  Once able to advance TF rate to >30 ml/hr (and anticipate ongoing adv towards TF goal rate), discontinue 20% IV lipids and decrease CPN rate to 20 and let infuse until bag empty or expires.    3.  Once anticipate to wean off CPN (with MVI), order multivitamin/mineral (15 ml/day via FT) to help ensure micronutrient needs being met with suspected hypermetabolic demands and potential interruptions to TF infusions.      EVALUATION OF THE PROGRESS TOWARD GOALS   -Diet: NPO (since intubated on 7/4)    -Enteral access: NJT (10 Fr, 140 cm Cortrak) placed and Bridled on 7/10/17 and confirmed on AXR    -EN: currently receiving Peptamen 1.5 @ 10 ml/hr (240 ml/day) = 360  kcals, 16 g PRO    -CPN: started on 7/4 and advanced to initial goal dextrose as of 7/9; currently receiving @ 65 ml/hr (1560 ml/day) with 235 g Dex, 145 g AA daily and 250 ml 20% IV lipids daily) = 1879 kcals/day (20 kcal/kg/day), GIR 1.7 with 27% kcals from Fat.  Micro/Rx: MVI + Trace (daily)    -Intakes: Difficult to accurately evaluate energy intakes with changes made to CPN dextrose 7/4-7/9 and I/Os (charting < and > 100% of goal volumes) but anticipate meeting >50% of est needs and meeting goals for PRO intakes the last 5 days.     NEW FINDINGS   -GI: last stool output x1 on 7/8; noted in Op notes that pt with evidence of inflamed caecum and ascending colon so opted for fiber-free/more elemental TF formula initially.      -Weight: 93.4 kg today (7/11/2017) compared to adm wt (previous dosing) of 94.2 kg = loss of 0.8 kg this LOS (x7 days) = 1% loss.    -Resp: Remains on vent.  Obtained metabolic cart study today (7/11/2017) @ ~07:00 with the following results: MREE = 3115 kcals/day (equiv to 33 kcal/kg/day per new dosing wt of 93 kg) with RQ = 0.83.  Pt received 140 ml of TF + 100% goal CPN + 250 ml 20% lipids + 1200 ml D5W in 24 hours preceding the study providing a total of 2293 kcals (74 % MREE) and 155 g PRO (1.7 g/kg/day).  RQ is within a logical and physiologic range given provisions (hypo-caloric with increased PRO) received prior to study.  Would aim energy intakes minimally at 80% of this MREE (equiv to 2492 kcals/day = equiv to 27 kcal/kg/day per new dosing wt of 93 kg) while minimally aiming for 1.2-1.5+ g PRO/kg/day given transplant status but pending renal clearance given  mg/dL today.    -FEN: D5W @ 50 ml/hr for hypernatremia (Na+ 150 mg/dL) but     MALNUTRITION  % Intake: Decreased intake does not meet criteria  % Weight Loss: Up to 1-2% in 1 week (non-severe)  Subcutaneous Fat Loss: Facial region and Thoracic/intercostal:  At least Mild  Muscle Loss: Temporal, Facial & jaw region,  Thoracic region (clavicle, acromium bone, deltoid, trapezius, pectoral) and Upper leg (quadricep, hamstring):  Mild to Moderate  Fluid Accumulation/Edema: Mild (hands, feet/ankles/legs), Severe (per RN 4+ in scrotum)  Malnutrition Diagnosis: (at least) Non-severe malnutrition in the context of acute on chronic illness.    Previous Goals   Total avg nutritional intake to meet a minimum of 20 kcal/kg and 1.2 gm PRO/kg daily (per dosing wt 94 kg admit wt ).  Evaluation: Partially Met (for PRO, likely not yet for energy)    Previous Nutrition Diagnosis  Inadequate oral intake   Evaluation: Declining and not the most applicable with intubation/NPO status to continue for unclear duration    CURRENT NUTRITION DIAGNOSIS  Inadequate energy intake r/t intubation inhibiting ability to resume oral diet, GI status (surgery/inflammed colon) inhibiting ability to initially place FT (only recently gained access yesterday) and only recently advanced CPN to goal dextrose (as of 7/9) in pt with hypermetabolic demands AEB difficult to measure exact energy intakes but current goal CPN + TF + D5W providing 2239 kcals (72% MREE).    INTERVENTIONS  Implementation  Collaboration and Referral of Nutrition care - Discussed plan for FEN/GI on rounds with Providers who approved to adv TF to 20 ml/hr but desire continue CPN until pt has evidence of stool outputs before adv TF further/wean CPN.      Goals  1.  Tolerate adv of TF to goal infusion and wean off CPN within the next 3 days  2.  Total ave nutrition intakes (TF + CPN/IV lipids + D5 if continued) to provide minimum 80% MREE (equiv to 27 kcal/kg/day) and 1.2 g PRO/kg/day (per 93 kg).     Monitoring/Evaluation  Progress toward goals will be monitored and evaluated per protocol.     Janice Christianson ,RD, LD, CNSC (pgr [phone])

## 2017-07-12 ENCOUNTER — APPOINTMENT (OUTPATIENT)
Dept: GENERAL RADIOLOGY | Facility: CLINIC | Age: 52
End: 2017-07-12
Attending: NURSE PRACTITIONER
Payer: COMMERCIAL

## 2017-07-12 ENCOUNTER — APPOINTMENT (OUTPATIENT)
Dept: GENERAL RADIOLOGY | Facility: CLINIC | Age: 52
End: 2017-07-12
Attending: TRANSPLANT SURGERY
Payer: COMMERCIAL

## 2017-07-12 LAB
ALBUMIN SERPL-MCNC: 1.8 G/DL (ref 3.4–5)
ALP SERPL-CCNC: 168 U/L (ref 40–150)
ALT SERPL W P-5'-P-CCNC: 22 U/L (ref 0–70)
ANION GAP SERPL CALCULATED.3IONS-SCNC: 8 MMOL/L (ref 3–14)
ANION GAP SERPL CALCULATED.3IONS-SCNC: 8 MMOL/L (ref 3–14)
AST SERPL W P-5'-P-CCNC: 29 U/L (ref 0–45)
BACTERIA SPEC CULT: ABNORMAL
BASOPHILS # BLD AUTO: 0 10E9/L (ref 0–0.2)
BASOPHILS # BLD AUTO: 0 10E9/L (ref 0–0.2)
BASOPHILS NFR BLD AUTO: 0 %
BASOPHILS NFR BLD AUTO: 0 %
BILIRUB DIRECT SERPL-MCNC: 0.2 MG/DL (ref 0–0.2)
BILIRUB SERPL-MCNC: 0.4 MG/DL (ref 0.2–1.3)
BUN SERPL-MCNC: 111 MG/DL (ref 7–30)
BUN SERPL-MCNC: 121 MG/DL (ref 7–30)
CALCIUM SERPL-MCNC: 8 MG/DL (ref 8.5–10.1)
CALCIUM SERPL-MCNC: 8.1 MG/DL (ref 8.5–10.1)
CHLORIDE SERPL-SCNC: 120 MMOL/L (ref 94–109)
CHLORIDE SERPL-SCNC: 121 MMOL/L (ref 94–109)
CO2 SERPL-SCNC: 20 MMOL/L (ref 20–32)
CO2 SERPL-SCNC: 21 MMOL/L (ref 20–32)
CREAT SERPL-MCNC: 2.02 MG/DL (ref 0.66–1.25)
CREAT SERPL-MCNC: 2.15 MG/DL (ref 0.66–1.25)
DACRYOCYTES BLD QL SMEAR: SLIGHT
DIFFERENTIAL METHOD BLD: ABNORMAL
DIFFERENTIAL METHOD BLD: ABNORMAL
ELLIPTOCYTES BLD QL SMEAR: SLIGHT
EOSINOPHIL # BLD AUTO: 0 10E9/L (ref 0–0.7)
EOSINOPHIL # BLD AUTO: 0.1 10E9/L (ref 0–0.7)
EOSINOPHIL NFR BLD AUTO: 0 %
EOSINOPHIL NFR BLD AUTO: 0.9 %
ERYTHROCYTE [DISTWIDTH] IN BLOOD BY AUTOMATED COUNT: 14.8 % (ref 10–15)
ERYTHROCYTE [DISTWIDTH] IN BLOOD BY AUTOMATED COUNT: 14.9 % (ref 10–15)
GFR SERPL CREATININE-BSD FRML MDRD: 32 ML/MIN/1.7M2
GFR SERPL CREATININE-BSD FRML MDRD: 35 ML/MIN/1.7M2
GLUCOSE BLDC GLUCOMTR-MCNC: 102 MG/DL (ref 70–99)
GLUCOSE BLDC GLUCOMTR-MCNC: 112 MG/DL (ref 70–99)
GLUCOSE BLDC GLUCOMTR-MCNC: 112 MG/DL (ref 70–99)
GLUCOSE BLDC GLUCOMTR-MCNC: 118 MG/DL (ref 70–99)
GLUCOSE BLDC GLUCOMTR-MCNC: 132 MG/DL (ref 70–99)
GLUCOSE BLDC GLUCOMTR-MCNC: 136 MG/DL (ref 70–99)
GLUCOSE BLDC GLUCOMTR-MCNC: 138 MG/DL (ref 70–99)
GLUCOSE BLDC GLUCOMTR-MCNC: 138 MG/DL (ref 70–99)
GLUCOSE BLDC GLUCOMTR-MCNC: 151 MG/DL (ref 70–99)
GLUCOSE BLDC GLUCOMTR-MCNC: 152 MG/DL (ref 70–99)
GLUCOSE BLDC GLUCOMTR-MCNC: 152 MG/DL (ref 70–99)
GLUCOSE BLDC GLUCOMTR-MCNC: 154 MG/DL (ref 70–99)
GLUCOSE BLDC GLUCOMTR-MCNC: 154 MG/DL (ref 70–99)
GLUCOSE BLDC GLUCOMTR-MCNC: 161 MG/DL (ref 70–99)
GLUCOSE BLDC GLUCOMTR-MCNC: 163 MG/DL (ref 70–99)
GLUCOSE BLDC GLUCOMTR-MCNC: 166 MG/DL (ref 70–99)
GLUCOSE BLDC GLUCOMTR-MCNC: 174 MG/DL (ref 70–99)
GLUCOSE SERPL-MCNC: 118 MG/DL (ref 70–99)
GLUCOSE SERPL-MCNC: 188 MG/DL (ref 70–99)
GRAM STN SPEC: ABNORMAL
HCT VFR BLD AUTO: 25.5 % (ref 40–53)
HCT VFR BLD AUTO: 26.5 % (ref 40–53)
HGB BLD-MCNC: 8 G/DL (ref 13.3–17.7)
HGB BLD-MCNC: 8.1 G/DL (ref 13.3–17.7)
HGB BLD-MCNC: 8.4 G/DL (ref 13.3–17.7)
INTERPRETATION ECG - MUSE: NORMAL
KOH PREP SPEC: NORMAL
LYMPHOCYTES # BLD AUTO: 0.2 10E9/L (ref 0.8–5.3)
LYMPHOCYTES # BLD AUTO: 0.5 10E9/L (ref 0.8–5.3)
LYMPHOCYTES NFR BLD AUTO: 2.7 %
LYMPHOCYTES NFR BLD AUTO: 5.2 %
Lab: ABNORMAL
MAGNESIUM SERPL-MCNC: 2.3 MG/DL (ref 1.6–2.3)
MAGNESIUM SERPL-MCNC: 2.3 MG/DL (ref 1.6–2.3)
MCH RBC QN AUTO: 27.4 PG (ref 26.5–33)
MCH RBC QN AUTO: 28 PG (ref 26.5–33)
MCHC RBC AUTO-ENTMCNC: 31.4 G/DL (ref 31.5–36.5)
MCHC RBC AUTO-ENTMCNC: 31.7 G/DL (ref 31.5–36.5)
MCV RBC AUTO: 87 FL (ref 78–100)
MCV RBC AUTO: 88 FL (ref 78–100)
METAMYELOCYTES # BLD: 0.2 10E9/L
METAMYELOCYTES # BLD: 0.6 10E9/L
METAMYELOCYTES NFR BLD MANUAL: 1.7 %
METAMYELOCYTES NFR BLD MANUAL: 6.2 %
MICRO REPORT STATUS: ABNORMAL
MICRO REPORT STATUS: ABNORMAL
MICRO REPORT STATUS: NORMAL
MICROORGANISM SPEC CULT: ABNORMAL
MONOCYTES # BLD AUTO: 0 10E9/L (ref 0–1.3)
MONOCYTES # BLD AUTO: 0.2 10E9/L (ref 0–1.3)
MONOCYTES NFR BLD AUTO: 0 %
MONOCYTES NFR BLD AUTO: 1.8 %
MYELOCYTES # BLD: 0.2 10E9/L
MYELOCYTES NFR BLD MANUAL: 1.7 %
NEUTROPHILS # BLD AUTO: 8.1 10E9/L (ref 1.6–8.3)
NEUTROPHILS # BLD AUTO: 8.2 10E9/L (ref 1.6–8.3)
NEUTROPHILS NFR BLD AUTO: 89.6 %
NEUTROPHILS NFR BLD AUTO: 90.2 %
NRBC # BLD AUTO: 0.1 10*3/UL
NRBC BLD AUTO-RTO: 1 /100
PHOSPHATE SERPL-MCNC: 4.1 MG/DL (ref 2.5–4.5)
PHOSPHATE SERPL-MCNC: 4.3 MG/DL (ref 2.5–4.5)
PLATELET # BLD AUTO: 104 10E9/L (ref 150–450)
PLATELET # BLD AUTO: 84 10E9/L (ref 150–450)
PLATELET # BLD EST: ABNORMAL 10*3/UL
POIKILOCYTOSIS BLD QL SMEAR: SLIGHT
POLYCHROMASIA BLD QL SMEAR: SLIGHT
POTASSIUM SERPL-SCNC: 4 MMOL/L (ref 3.4–5.3)
POTASSIUM SERPL-SCNC: 4 MMOL/L (ref 3.4–5.3)
POTASSIUM UR-SCNC: 11 MMOL/L
PRO FRG1+2 SERPL-SCNC: 0.52 NMOL/L
PROT SERPL-MCNC: 5 G/DL (ref 6.8–8.8)
RBC # BLD AUTO: 2.92 10E12/L (ref 4.4–5.9)
RBC # BLD AUTO: 3 10E12/L (ref 4.4–5.9)
RBC MORPH BLD: NORMAL
SODIUM SERPL-SCNC: 148 MMOL/L (ref 133–144)
SODIUM SERPL-SCNC: 149 MMOL/L (ref 133–144)
SODIUM SERPL-SCNC: 149 MMOL/L (ref 133–144)
SODIUM SERPL-SCNC: 150 MMOL/L (ref 133–144)
SODIUM UR-SCNC: 22 MMOL/L
SPECIMEN SOURCE: ABNORMAL
SPECIMEN SOURCE: ABNORMAL
SPECIMEN SOURCE: NORMAL
TACROLIMUS BLD-MCNC: 5.9 UG/L (ref 5–15)
TME LAST DOSE: NORMAL H
TRIGL SERPL-MCNC: 114 MG/DL
WBC # BLD AUTO: 9 10E9/L (ref 4–11)
WBC # BLD AUTO: 9.2 10E9/L (ref 4–11)

## 2017-07-12 PROCEDURE — 84300 ASSAY OF URINE SODIUM: CPT | Performed by: CLINICAL NURSE SPECIALIST

## 2017-07-12 PROCEDURE — 85025 COMPLETE CBC W/AUTO DIFF WBC: CPT | Performed by: SURGERY

## 2017-07-12 PROCEDURE — 40000077 ZZH STATISTIC ICP MONITORING

## 2017-07-12 PROCEDURE — 25000132 ZZH RX MED GY IP 250 OP 250 PS 637: Performed by: STUDENT IN AN ORGANIZED HEALTH CARE EDUCATION/TRAINING PROGRAM

## 2017-07-12 PROCEDURE — 80048 BASIC METABOLIC PNL TOTAL CA: CPT | Performed by: SURGERY

## 2017-07-12 PROCEDURE — 94681 O2 UPTK CO2 OUTP % O2 XTRC: CPT

## 2017-07-12 PROCEDURE — 84100 ASSAY OF PHOSPHORUS: CPT | Performed by: SURGERY

## 2017-07-12 PROCEDURE — 83735 ASSAY OF MAGNESIUM: CPT | Performed by: SURGERY

## 2017-07-12 PROCEDURE — 87210 SMEAR WET MOUNT SALINE/INK: CPT | Performed by: SURGERY

## 2017-07-12 PROCEDURE — 25000128 H RX IP 250 OP 636: Performed by: PHYSICIAN ASSISTANT

## 2017-07-12 PROCEDURE — 87070 CULTURE OTHR SPECIMN AEROBIC: CPT | Performed by: SURGERY

## 2017-07-12 PROCEDURE — 80076 HEPATIC FUNCTION PANEL: CPT | Performed by: SURGERY

## 2017-07-12 PROCEDURE — 20000004 ZZH R&B ICU UMMC

## 2017-07-12 PROCEDURE — 25000131 ZZH RX MED GY IP 250 OP 636 PS 637: Performed by: PHYSICIAN ASSISTANT

## 2017-07-12 PROCEDURE — 25000128 H RX IP 250 OP 636: Performed by: SURGERY

## 2017-07-12 PROCEDURE — 99291 CRITICAL CARE FIRST HOUR: CPT | Performed by: NURSE PRACTITIONER

## 2017-07-12 PROCEDURE — 25000125 ZZHC RX 250: Performed by: PHYSICIAN ASSISTANT

## 2017-07-12 PROCEDURE — 25000132 ZZH RX MED GY IP 250 OP 250 PS 637: Performed by: NURSE PRACTITIONER

## 2017-07-12 PROCEDURE — 31624 DX BRONCHOSCOPE/LAVAGE: CPT

## 2017-07-12 PROCEDURE — 84295 ASSAY OF SERUM SODIUM: CPT | Performed by: SURGERY

## 2017-07-12 PROCEDURE — 40000014 ZZH STATISTIC ARTERIAL MONITORING DAILY

## 2017-07-12 PROCEDURE — 87106 FUNGI IDENTIFICATION YEAST: CPT | Performed by: SURGERY

## 2017-07-12 PROCEDURE — 40000275 ZZH STATISTIC RCP TIME EA 10 MIN

## 2017-07-12 PROCEDURE — 84295 ASSAY OF SERUM SODIUM: CPT | Performed by: NURSE PRACTITIONER

## 2017-07-12 PROCEDURE — 87077 CULTURE AEROBIC IDENTIFY: CPT | Performed by: SURGERY

## 2017-07-12 PROCEDURE — 25000132 ZZH RX MED GY IP 250 OP 250 PS 637: Performed by: SURGERY

## 2017-07-12 PROCEDURE — 25000131 ZZH RX MED GY IP 250 OP 636 PS 637: Performed by: TRANSPLANT SURGERY

## 2017-07-12 PROCEDURE — 87205 SMEAR GRAM STAIN: CPT | Performed by: SURGERY

## 2017-07-12 PROCEDURE — 80197 ASSAY OF TACROLIMUS: CPT | Performed by: PHYSICIAN ASSISTANT

## 2017-07-12 PROCEDURE — 87102 FUNGUS ISOLATION CULTURE: CPT | Performed by: SURGERY

## 2017-07-12 PROCEDURE — 25000128 H RX IP 250 OP 636: Performed by: STUDENT IN AN ORGANIZED HEALTH CARE EDUCATION/TRAINING PROGRAM

## 2017-07-12 PROCEDURE — 25000128 H RX IP 250 OP 636: Performed by: NURSE PRACTITIONER

## 2017-07-12 PROCEDURE — 87186 SC STD MICRODIL/AGAR DIL: CPT | Performed by: SURGERY

## 2017-07-12 PROCEDURE — 27210437 ZZH NUTRITION PRODUCT SEMIELEM INTERMED LITER

## 2017-07-12 PROCEDURE — 25000132 ZZH RX MED GY IP 250 OP 250 PS 637

## 2017-07-12 PROCEDURE — 84133 ASSAY OF URINE POTASSIUM: CPT | Performed by: CLINICAL NURSE SPECIALIST

## 2017-07-12 PROCEDURE — 00000146 ZZHCL STATISTIC GLUCOSE BY METER IP

## 2017-07-12 PROCEDURE — 94003 VENT MGMT INPAT SUBQ DAY: CPT

## 2017-07-12 PROCEDURE — 71010 XR CHEST PORT 1 VW: CPT | Mod: 77

## 2017-07-12 PROCEDURE — 71010 XR CHEST PORT 1 VW: CPT

## 2017-07-12 PROCEDURE — 25000125 ZZHC RX 250

## 2017-07-12 PROCEDURE — 85018 HEMOGLOBIN: CPT | Performed by: NURSE PRACTITIONER

## 2017-07-12 PROCEDURE — 84478 ASSAY OF TRIGLYCERIDES: CPT | Performed by: SURGERY

## 2017-07-12 PROCEDURE — 31624 DX BRONCHOSCOPE/LAVAGE: CPT | Mod: GC | Performed by: SURGERY

## 2017-07-12 RX ORDER — LIDOCAINE HYDROCHLORIDE 10 MG/ML
INJECTION, SOLUTION EPIDURAL; INFILTRATION; INTRACAUDAL; PERINEURAL
Status: COMPLETED
Start: 2017-07-12 | End: 2017-07-12

## 2017-07-12 RX ORDER — AMOXICILLIN 250 MG
2 CAPSULE ORAL 2 TIMES DAILY
Status: DISCONTINUED | OUTPATIENT
Start: 2017-07-12 | End: 2017-07-13

## 2017-07-12 RX ORDER — ERTAPENEM 1 G/1
1 INJECTION, POWDER, LYOPHILIZED, FOR SOLUTION INTRAMUSCULAR; INTRAVENOUS EVERY 24 HOURS
Status: COMPLETED | OUTPATIENT
Start: 2017-07-13 | End: 2017-07-17

## 2017-07-12 RX ADMIN — HUMAN INSULIN 7 UNITS/HR: 100 INJECTION, SOLUTION SUBCUTANEOUS at 02:04

## 2017-07-12 RX ADMIN — TACROLIMUS 1 MG: 5 CAPSULE ORAL at 09:42

## 2017-07-12 RX ADMIN — POTASSIUM CHLORIDE 20 MEQ: 1.5 POWDER, FOR SOLUTION ORAL at 05:00

## 2017-07-12 RX ADMIN — HUMAN INSULIN 9 UNITS/HR: 100 INJECTION, SOLUTION SUBCUTANEOUS at 05:56

## 2017-07-12 RX ADMIN — FLUDROCORTISONE ACETATE 0.1 MG: 0.1 TABLET ORAL at 08:06

## 2017-07-12 RX ADMIN — HEPARIN SODIUM 5000 UNITS: 5000 INJECTION, SOLUTION INTRAVENOUS; SUBCUTANEOUS at 03:48

## 2017-07-12 RX ADMIN — DEXTROSE MONOHYDRATE: 50 INJECTION, SOLUTION INTRAVENOUS at 07:26

## 2017-07-12 RX ADMIN — HUMAN INSULIN 9 UNITS/HR: 100 INJECTION, SOLUTION SUBCUTANEOUS at 12:50

## 2017-07-12 RX ADMIN — HUMAN INSULIN 8 UNITS/HR: 100 INJECTION, SOLUTION SUBCUTANEOUS at 09:10

## 2017-07-12 RX ADMIN — HEPARIN SODIUM 5000 UNITS: 5000 INJECTION, SOLUTION INTRAVENOUS; SUBCUTANEOUS at 20:32

## 2017-07-12 RX ADMIN — ASPIRIN 81 MG CHEWABLE TABLET 81 MG: 81 TABLET CHEWABLE at 08:06

## 2017-07-12 RX ADMIN — ACETAMINOPHEN 650 MG: 325 TABLET, FILM COATED ORAL at 00:07

## 2017-07-12 RX ADMIN — POLYETHYLENE GLYCOL 3350 17 G: 17 POWDER, FOR SOLUTION ORAL at 08:06

## 2017-07-12 RX ADMIN — SENNOSIDES AND DOCUSATE SODIUM 2 TABLET: 8.6; 5 TABLET ORAL at 11:15

## 2017-07-12 RX ADMIN — HUMAN INSULIN 5 UNITS/HR: 100 INJECTION, SOLUTION SUBCUTANEOUS at 17:29

## 2017-07-12 RX ADMIN — DEXTROSE MONOHYDRATE: 50 INJECTION, SOLUTION INTRAVENOUS at 00:50

## 2017-07-12 RX ADMIN — LIDOCAINE HYDROCHLORIDE 4 ML: 10 INJECTION, SOLUTION EPIDURAL; INFILTRATION; INTRACAUDAL; PERINEURAL at 13:11

## 2017-07-12 RX ADMIN — TACROLIMUS 4.5 MG: 5 CAPSULE ORAL at 17:30

## 2017-07-12 RX ADMIN — HUMAN INSULIN 10 UNITS/HR: 100 INJECTION, SOLUTION SUBCUTANEOUS at 22:06

## 2017-07-12 RX ADMIN — HEPARIN SODIUM 5000 UNITS: 5000 INJECTION, SOLUTION INTRAVENOUS; SUBCUTANEOUS at 11:15

## 2017-07-12 RX ADMIN — DEXTROSE MONOHYDRATE: 50 INJECTION, SOLUTION INTRAVENOUS at 17:35

## 2017-07-12 RX ADMIN — ERTAPENEM SODIUM 1 G: 1 INJECTION, POWDER, LYOPHILIZED, FOR SOLUTION INTRAMUSCULAR; INTRAVENOUS at 09:52

## 2017-07-12 RX ADMIN — TACROLIMUS 4.5 MG: 5 CAPSULE ORAL at 09:42

## 2017-07-12 RX ADMIN — PANTOPRAZOLE SODIUM 40 MG: 40 TABLET, DELAYED RELEASE ORAL at 08:08

## 2017-07-12 RX ADMIN — MULTIVIT AND MINERALS-FERROUS GLUCONATE 9 MG IRON/15 ML ORAL LIQUID 15 ML: at 11:15

## 2017-07-12 RX ADMIN — DEXTROSE MONOHYDRATE: 50 INJECTION, SOLUTION INTRAVENOUS at 13:11

## 2017-07-12 RX ADMIN — ACETAMINOPHEN 650 MG: 325 TABLET, FILM COATED ORAL at 23:39

## 2017-07-12 RX ADMIN — DEXTROSE MONOHYDRATE: 50 INJECTION, SOLUTION INTRAVENOUS at 23:35

## 2017-07-12 RX ADMIN — HUMAN INSULIN 12 UNITS/HR: 100 INJECTION, SOLUTION SUBCUTANEOUS at 14:11

## 2017-07-12 NOTE — PROGRESS NOTES
No acute changes  afvss  Palpable right ulnar pulse  Tip of right index finger is black  Imp  Right radial art thrombus and black index finger  Hand is not threatened  No vascular intervention indicated  Aspirin  Vascular will sign off  Grace Edi  vascular

## 2017-07-12 NOTE — PROGRESS NOTES
NUTRITION SERVICES - BRIEF NOTE    Pt currently receiving the followin/10-___: Peptamen 1.5 @ 10-20 ml/hr (240-480 ml) = 360-720 kcals, 16-33 g PRO  -  -___: CPN @ 45 ml/hr (1080 ml/day) with 300g Dex daily, 100 g AA daily and 250 ml 20% IV lipids every M/W/F (x3 d/wk) = 1634 kcals/day (18+ kcal/kg/day or 52%+ of MREE), 1.1 g PRO/kg/day (+ pending TF PRO), GIR 2.2 with 13% kcals from Fat.  Micro/Rx: same.    Implementations  Per discussion with MDs...  -Initiate TF advancement schedule to Peptamen 1.5 @ goal 80 ml/hr (1920 ml/day) to provide 2880 kcals (92% of MREe or 31 kcal/kg/day), 131 g PRO (1.4 g/kg/day), 1478 ml free H2O, 108 g Fat (70% from MCTs), 361 g CHO and no Fiber daily.    -D/C IV lipids and cut PN rate in half to run bag out.  -Order Certavite MVI for micronutrient needs while on TF/PN weaning    Monitoring  Nutrition will continue to follow and monitor per POC (see  RD note).    Rosaura Oseguera RDN, LD  Pgr: [phone]

## 2017-07-12 NOTE — PLAN OF CARE
Problem: Goal Outcome Summary  Goal: Goal Outcome Summary  Outcome: Improving  -Neuro: tmax 100.3, eyes open to voice but patient not following commands. However, pt was responding appropriately to family member.  PERRLA. Tracks. Moves all extremities, restless at times with more movement of R side. BL wrist restraints remain in place.  -Resp: remain on vent. CMV settings RR 22, , PEEP 5, FiO2 30%. Has been tachypneic throughout the day and tolerated PS for about 2 hours this AM. Bronch this AM  -Cardiac: Hypertensive up to 170s. Orders to treat systolic >180. NSR HR in 80-90s  -GI/: TF increased to 30 cc at 1200. Advance as ordered to goal of 80 cc. TPN continuous. Large BM x2 today. NGT to LIS. Crowe draining clear, yellow urine, good output  -Skin: midline incision open to air with staples, no drainage. Scrotal edema. Some fingers/toes necrotic, cool. MD aware. R Radial clot, ulnar pulse palpable, stable  -Access: L brachial arterial line, R TL CVC. CVPs 5-7  -labs: , creatinine and BUN trending down  -drains: R CT to water seal. To be removed this evening. No drainage  -Gtts: insulin gtt titrated throughout shift, TPN at 45 cc/hr, D5 at 200 cc/hr  -Activity: up to chair this evening with sling     Plan: continue nursing cares, promote activity, advance TF, monitor neurologic status, pain control, extubate, monitor labs     Yoly Cage  7/12/2017  5:47 PM

## 2017-07-12 NOTE — PLAN OF CARE
Problem: Goal Outcome Summary  Goal: Goal Outcome Summary  Outcome: No Change  D: Tmax 100.3. Sinus rhythm. Normotensive with occasional hypertension. On minimal CMV settings, 30%, rate 22, 500, peep 5.   I/A: Opens eyes spontaneously. Not following commands. Purposeful movements. Agitation with cares. Also having hypertension with cares, sbp 170-190. MD aware of hypertension. PRN 650mg tylenol given. Adequate UOP. Had 2 small BMs this shift. 20K replaced this am. Minimal output from chest tube. TF at 20cc/hr. TPN infusing. NG to LIS. Continues on insulin gtt and D5 @150cc/hr. PST this am for approximately 15-30min, became hypertensive 200 sbp, tachypneic, and tachycardic. Appears to be restless and anxious at times.   P: Continue to monitor pt. Consult SICU team with any changes/concerns.

## 2017-07-12 NOTE — PROCEDURES
Right Pigtail chest tube removal note     The patient was placed in Semi-thomas s position.The chest tube was clamped by hand. The dressing was removed, insertion site was cleansed with chlorhexidine.  Sutures were removed. The patient was on mechanical ventilation and chest tube was retracted during exhalation in one lou motion. An occlusive dressing was applied immediately. The patient's vital signs were stable post procedure. A post pull CXR 4 hours from removal was ordered. A CXR should be obtained STAT if signs of respiratory distress, subcutaneous emphysema or bleeding from the site should occur.  Contact SICU staff immediately if any complications are noted.   JOSELINE Chester

## 2017-07-12 NOTE — PROGRESS NOTES
[facility] [place]    Surgical Intensive Care Unit (SICU) Service  Progress Note    Date of Service (when I saw the patient): 07/12/2017     Assessment & Plan   Camacho Bhagat is a 51 year old male who was admitted on 7/4/2017 with abdominal pain and fever.  He was found to have neutropenic colitis.  He underwent liver transplant March 2017 for CASTAÑEDA.      7/4/2017                    Exploratory laparotomy due to concern for perforation, viable hepatic flexure   7/5/2017                    Abdominal washout and closure  7/5/2017                    Left axillary arterial line, bronchoscopy and BAL   7/9/2017                    Right pleural pigtail catheter   7/12/2017                  Pigtail catheter removed       Major Plans for Today:  Changes/Updates to plan on 7/12:  - Schedule Senna   - Gradually increase TF to goal  - Increase D5W to 200 ml/hr  - Q6H Na checks  - Brochoscopy today  - Stop date on Ertapenem for 2 weeks  - Remove chest tube  - TPN cut in half and to run out         Assessment/Plan:     PROBLEM LIST:  # neutopenic colitis s/p ex lap  # septic shock, resolved  # toxic metabolic encephalopathy  # right pleural effusion s/p pigtail  # EJ, improving  # hypernatremia  # thrombocytopenia   #neutropenia; resolved   # protein calorie malnutrition  # DM type 2  # CASTAÑEDA s/p liver transplant (3/2017)  # History of DVT with IVC filter    HEMODYNAMICS/CV:  #septic shock; resolved   #History of DVT with IVC filter  #Right radial artery occlusion   - Maintaining MAPs without pressors  -ASA for radial artery occlusion     NEUROLOGIC:  #toxic metabolic encephalopathy   - pain: Tylenol 650 mg PO Q4H PRN  - no sedation   - neurology consulted AMS likely 2/2 toxic encephalopathy.     PULMONARY:  #acute respiratory failure  #Right pleural effusion s/p pigtail placement   - remains intubated, mental status precludes extubation; continue daily PST, mentation improving    - CMV 22, 440, PEEP  5, 30%  - R pigtail in place, minimal output; on water seal; similar appearance on chest x-ray, will remove this afternoon.  -Bronchoscopy today     RENAL:  #EJ  #hypernatremia   - UOP adquate  - EJ resolving  - nephrology consulted and following, hold off dialysis now  - will increase D5 to 200 mL/hr; start free water via feeding tube  - continue to follow Na q6    ID:  #neutropenic colitis   #VRE sputum   #Fevers  #Neutropenia; resolved   - continue ertapenem for total of 2 weeks on Abx  - ID consulted and following; recommend CT abdomen to assess for abscess formation.  Hold on CT scan at this time per surgical team   - pan cultured ; No growth to date   - follow weekly CMV PCR while off valcyte  -neutropenia since ; felt to be 2/2 immunosuppression. Valcyte stopped; counts improving      HEME:  #thombocytopenia   #anemia  - Plts stable  - Hgb stable  - pancytopenia acute on chronic, likely secondary to medications, MMF and bactrim on hold since , valcyte held on admission  - Neupogen 480 mcg given -   - US  showing distal right radial artery occlusion, vascular surgery consulted, started ASA    GI/NUTRITION:  #protein/calorie malnutrition   #colitis   - cut TPN in half today and let bag .  Advance TF to goal.   - Senna, Miralax. Only smear of stool thus far.     ENDOCRINE:  #hyperglycemia   #DMII  - continue home fludrocortisone 0.1 mg daily  - continue insulin gtt    MSK:  - PT/OT  - mobilize    SKIN:  - local wound cares    PROPHYLAXIS:  - DVT: heparin 5000 TID  - GI: pantoprazole 40     VASCULAR ACCESS:  - R IJ CVC - likely remove tomorrow  - L axillary art line - likely remove within next 1-2 days  - PIV    CODE STATUS:  - FULL CODE    DISPOSITION:  - SICU    GENERAL CARES  DVT Prophylaxis: Heparin SQ  GI Prophylaxis: PPI  Restraints: Restraints for medical healing needed: YES      JOSELINE Chester     Interval History   No acute events noted overnight.  Remains intubated,  tolerated PST x several hours, but RR and MV high and mental status precludes extubation. Remains encephalopathic.  Denies pain.     Physical Exam   Temp: 98.4  F (36.9  C) Temp src: Axillary Temp  Min: 98.4  F (36.9  C)  Max: 100.3  F (37.9  C)     Heart Rate: 95 Resp: 27 SpO2: 97 % O2 Device: Mechanical Ventilator    Vitals:    07/10/17 0600 07/11/17 0400 07/12/17 0000   Weight: 96.3 kg (212 lb 4.9 oz) 93.4 kg (205 lb 14.6 oz) 93 kg (205 lb 0.4 oz)     I/O last 3 completed shifts:  In: 5224.94 [I.V.:3126.09; NG/GT:450]  Out: 4085 [Urine:3560; Emesis/NG output:475; Chest Tube:50]    GEN: awake, alert, disoriented, no acute distress  EYES: PERRL, Anicteric sclera.    HEENT:  Normocephalic, atraumatic, trachea midline, ETT secure  CV: RRR, no gallops, rubs, or murmurs  PULM/CHEST: Course breath sounds bilaterally, symmetric chest rise  GI: normal bowel sounds, soft, non-tender, no rebound tenderness or guarding, no masses  : vazquez catheter in place, urine yellow and clear, noted scrotal edema  EXTREMITIES: generalized peripheral edema, moving all extremities, peripheral pulses intact  NEURO: Cranial nerves II-XII grossly intact, no motor-sensory deficits noted  SKIN: No rashes, sores or ulcerations  Imaging personally reviewed:CXR   ECG: SR/ST     Medications     parenteral nutrition - ADULT compounded formula 45 mL/hr at 07/11/17 2057     D5W 200 mL/hr at 07/12/17 1112     IV fluid REPLACEMENT ONLY       insulin (regular) 8 Units/hr (07/12/17 0910)       tacrolimus  4.5 mg Oral BID IS     senna-docusate  2 tablet Oral BID     [START ON 7/13/2017] ertapenem (INVanz) IV  1 g Intravenous Q24H     multivitamins with minerals  15 mL Per Feeding Tube Daily     heparin  5,000 Units Subcutaneous Q8H     polyethylene glycol  17 g Oral Daily     aspirin  81 mg Oral Daily     lidocaine (viscous)  5 mL Topical Once     pantoprazole  40 mg Oral or Feeding Tube Daily     fludrocortisone  0.1 mg Oral Daily     sodium chloride  (PF)  3 mL Intracatheter Q8H       Data     Recent Labs  Lab 07/12/17  0342 07/11/17  1624 07/11/17  0328  07/10/17  0340  07/08/17  0902  07/06/17  0316   WBC 9.0 12.3* 11.7*  < > 10.5  < >  --   < > 0.8*   HGB 8.0* 9.3* 8.5*  < > 8.5*  < >  --   < > 7.1*   MCV 87 88 88  < > 88  < >  --   < > 87   PLT 84* 93* 80*  < > 71*  < >  --   < > 27*   INR  --   --   --   --  1.17*  --  1.22*  --  1.80*   * 150* 150*  < > 149*  < >  --   < > 143   POTASSIUM 4.0 3.9 3.8  < > 3.3*  < >  --   < > 5.0   CHLORIDE 120* 121* 120*  < > 116*  < >  --   < > 116*   CO2 21 20 20  < > 21  < >  --   < > 18*   * 130* 140*  < > 125*  < >  --   < > 62*   CR 2.15* 2.25* 2.57*  < > 2.62*  < >  --   < > 2.75*   ANIONGAP 8 9 11  < > 12  < >  --   < > 9   JACOB 8.0* 8.3* 8.2*  < > 8.8  < >  --   < > 6.9*   * 167* 137*  < > 171*  < >  --   < > 139*   ALBUMIN 1.8*  --  1.8*  --  1.8*  --   --   < >  --    PROTTOTAL 5.0*  --  5.0*  --  5.0*  --   --   < >  --    BILITOTAL 0.4  --  0.5  --  0.7  --   --   < >  --    ALKPHOS 168*  --  158*  --  116  --   --   < >  --    ALT 22  --  27  --  34  --   --   < >  --    AST 29  --  34  --  67*  --   --   < >  --    < > = values in this interval not displayed.  Recent Results (from the past 24 hour(s))   US Upper Extremity Arterial Duplex Right   Result Value    Radiologist flags Occlusion of the distal radial artery (Urgent)    Narrative    Duplex Doppler ultrasound assessment of the upper extremities arteries  bilaterally 7/11/2017 1:15 PM    Clinical information: Assess for arterial occlusive disease    Ordering provider: Dr. Malone    Technique: B-mode (grayscale) and duplex Doppler ultrasound of the  upper extremity arteries. Velocity measurements obtained with angle  correction at or less than 60 degrees.    FINDINGS:     Peak systolic velocities:    Right Upper Extremity Arteries:     Subclavian: 157 cm/sec  Axillary artery: 119 cm/sec    Brachial proximal: 164 cm/sec   Brachial mid:  187 cm/sec  Brachial antecubital: 157 cm/sec    Radial artery origin: 102 cm/sec  Radial artery mid: 47 cm/sec  Radial artery distal (proximal to branch): 34 cm/sec  Radial artery distal (distal to branch): Occluded  Radial artery branch and distal forearm: 86 cm/s    Ulnar artery proximal: 146 cm/sec    Waveforms are triphasic throughout.      Impression    IMPRESSION:  The radial artery is occluded from the distal forearm to the wrist,  and terminates in distal forearm branches. All other examined arteries  of the right upper extremity are patent without stenosis, including  the ulnar artery.      [Urgent Result: Occlusion of the distal radial artery]    Finding was identified on 7/11/2017 1:18 PM.     Nitish Malone was contacted by Dr. Saeed at 7/11/2017 1:28 PM and  verbalized understanding of the urgent finding.       I have personally reviewed the examination and initial interpretation  and I agree with the findings.    RENEE DOBBS MD   XR Chest Port 1 View    Narrative    Examination: XR CHEST PORT 1 VW, 7/11/2017 5:02 PM    Comparison: 7/11/2017 at 1:27 AM    History: chest exam 6 hours post water seal    Findings: Endotracheal tube tip at the level of the mid thoracic  trachea. Enteric tubes extend into the left upper quadrant, tips not  included in the field-of-view. Right IJ central venous catheter tip at  the level of the mid SVC. Right basilar chest tube is stable in  position. The cardiomediastinal silhouette is stable, partially  obscured. Bilateral pleural effusions and associated bibasilar  opacities are not significantly changed. No appreciable pneumothorax.      Impression    Impression:   1. No appreciable pneumothorax.  2. Bilateral pleural effusions and associated perihilar/bibasilar  opacities are not significantly changed.    I have personally reviewed the examination and initial interpretation  and I agree with the findings.    DIAZ NORMAN MD   XR Chest Port 1 View    Narrative     Examination: XR CHEST PORT 1 VW, 7/12/2017 2:06 AM    Comparison: 7/11/2017 at 16:57 PM.    History: intubated    Findings: Endotracheal tube tip in the mid thoracic trachea. Unchanged  partially visualized enteric tubes. Right IJ central line tip over  distal SVC. Unchanged right basilar chest tube.    Stable cardiac silhouette. Persistent perihilar and bibasilar streaky  opacities. Stable pleural effusions and associated basilar opacities.  No appreciable pneumothorax.      Impression    Impression:   1.  Bilateral pleural effusions and associated perihilar/bibasilar  opacities are not significantly changed.  2.  Stable support devices.    I have personally reviewed the examination and initial interpretation  and I agree with the findings.    DIAZ NORMAN MD

## 2017-07-12 NOTE — PROGRESS NOTES
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     Graft Function: Good function. US liver normal doppler. TB/ALT/AST WNL, Alk phos elevated. Ggt normal.   Immunosuppression Management:   CellCept 250 mg BID. Held since 6/27/17 due to neutropenia. Continue to hold.   Tac goal level 5-8. 7/10 level 4.9 (12 hour trough). Since MMF held will aim for goal ~8.  Increase Prograf 4.5 mg BID. Give 1 mg now. Repeat tac level tomorrow for 12 hour trough.   Cardiorespiratory: Intubated for ex lap, initially requiring pressor support. HDS. Moderate right sided pleural effusion. Chest tube placed 7/9, minimal output over past 24 hrs. Would remove. Vent management per SICU team. Continuing PS trials, neuro status limiting.   Hematology: Pancytopenia, acute on chronic, secondary to medications. MMF and bactrim held (since 6/27). Valcyte held on admission. Neupogen 480 mcg given on 7/4 to 7/7,  CMV seronegative and donor seropositive. 7/3 CMV PCR < 137. 7/10 CMV . Continue to hold MMF, bactrim and valcyte.   GI: CT from [place] demonstrating right sided colonic inflammation, AXR on admission with dilated loop of bowel in central abdomen, felt likely to sigmoid. Initial impression consistent with Typhlitis however given worsening abdominal pain on examination, hypotension and increasing lactate levels, elected to proceed with exploratory laparotomy. Findings on reexploration demonstrating persistent inflammation of the right colon without evidence of ischemia. Lactic acid normalized.  NJ placed TF Peptamen titrate to  goal  Fluid/Electrolytes/Renal:  EJ, secondary to hypoperfusion, sepsis. Cr 2.6 (2.5). . UO 4.6 L yesterday. Lasix now held. Nephrology consulting. Hyperkalemia, PTA on florinef. K stable. Na 150, would replace free water per NJ.  Endocrine: DM II, on insulin gtt.   Infectious disease: Tmax 101, WBC 9. Pan cx (urine, blood, sputum) negative thus far. Continue ertapenem. ID consulted. Recommend imaging to eval for abscess.    -Started on Zosyn, Vancomycin, Flagyl, Micafungin on admission (7/4/2017). 7/5 Blood cultures, no growth thus far. Abdominal fluid culture collected intraoperatively, gram stain with no organisms, fungal and bacterial culture, GPCs. Bronchoalveolar lavage growing VRE, colonization. Tx ID consulted recommending continue Ertapenem and evaluate for abscess given fever yesterday. Will continue ertapenem. Per SICU staff and surgeon, will wait for blood culture final results. No CT scan today.  Neuro: Delirium, hold, limit medications that have CNS SE. Oxycodone PRN pain. Neurology consulted- dx likely toxic metabolic encephalopathy, improving slowly.  Access: R IJ  Vascular: Right Arterial line clot 2/2 previous art line. Vascular consulted. Recommend ASA only. Some evidence of microvascular injury in digits (toes) this is most likely due to injury while patient was on pressor therapy and sepsis.   Prophylaxis: PPI, DVT-SCD  Disposition: SICU    Medical Decision Making: Medium  Admit [id number] (moderate level decision making)    PILLO/Fellow/Resident Provider: Ada Driver PA-C    Faculty: Chaparro Anderson M.D.      __________________________________________________________________  Transplant History:.  3/4/2017 DD OLT with Dr. Tran (Liver), Postoperative day: 130     Interval History: History is obtained from EMR and nursing staff.  Overnight events: No acute events o/n. BM smear x1.     ROS:   A 10-point review of systems was negative except as noted above.    Meds:    tacrolimus  4.5  mg Oral BID IS     senna-docusate  2 tablet Oral BID     [START ON 7/13/2017] ertapenem (INVanz) IV  1 g Intravenous Q24H     multivitamins with minerals  15 mL Per Feeding Tube Daily     heparin  5,000 Units Subcutaneous Q8H     polyethylene glycol  17 g Oral Daily     aspirin  81 mg Oral Daily     lidocaine (viscous)  5 mL Topical Once     pantoprazole  40 mg Oral or Feeding Tube Daily     fludrocortisone  0.1 mg Oral Daily     sodium chloride (PF)  3 mL Intracatheter Q8H       Physical Exam:     Admit Weight: 94.2 kg (207 lb 11.2 oz) (SCALE 2)    Current vitals:   /76  Pulse 101  Temp 98.4  F (36.9  C) (Axillary)  Resp 27  Ht 1.829 m (6')  Wt 93 kg (205 lb 0.4 oz)  SpO2 97%  BMI 27.81 kg/m2         Vital sign ranges:    Temp:  [98.4  F (36.9  C)-101.3  F (38.5  C)] 98.4  F (36.9  C)  Heart Rate:  [85-99] 95  Resp:  [25-30] 27  MAP:  [91 mmHg-121 mmHg] 117 mmHg  Arterial Line BP: (139-178)/(63-93) 178/79  FiO2 (%):  [30 %] 30 %  SpO2:  [95 %-100 %] 97 %  Patient Vitals for the past 24 hrs:   Temp Temp src Heart Rate Resp SpO2 Weight   07/12/17 0942 - - - - 97 % -   07/12/17 0800 98.4  F (36.9  C) Axillary - - - -   07/12/17 0752 - - - - 96 % -   07/12/17 0700 - - 95 - 97 % -   07/12/17 0600 - - 92 27 97 % -   07/12/17 0500 - - 87 - 98 % -   07/12/17 0400 100.3  F (37.9  C) Axillary 85 25 100 % -   07/12/17 0300 - - 88 - 98 % -   07/12/17 0200 - - 91 26 99 % -   07/12/17 0100 - - 90 - 98 % -   07/12/17 0000 100.1  F (37.8  C) Axillary 94 26 100 % 93 kg (205 lb 0.4 oz)   07/11/17 2300 - - 92 - 98 % -   07/11/17 2200 - - 91 29 98 % -   07/11/17 2100 - - 94 - 97 % -   07/11/17 2000 99.6  F (37.6  C) Axillary 98 28 95 % -   07/11/17 1900 - - 96 - 97 % -   07/11/17 1800 - - 92 - 97 % -   07/11/17 1700 - - 96 - - -   07/11/17 1600 99.5  F (37.5  C) Axillary 98 30 95 % -   07/11/17 1500 - - 93 - 96 % -   07/11/17 1400 - - 94 - 98 % -   07/11/17 1300 - - 99 - 96 % -   07/11/17 1200 101.3  F (38.5  C) Axillary  98 28 98 % -     General Appearance: NAD  Skin: normal, warm, dry  Heart: ST  Lungs: Vented  Abdomen: The abdomen is soft, midline incision is c/d/i and covered. Chevron incision well healed.   : vazquez is present, yellow urine in bag.   Extremities:BLE trace edema.  Neurologic: moving extremities, followed simple command.     Data:   CMP    Recent Labs  Lab 07/12/17  0342 07/11/17  1624 07/11/17  0328  07/06/17  0741   * 150* 150*  < >  --    POTASSIUM 4.0 3.9 3.8  < > 4.9   CHLORIDE 120* 121* 120*  < >  --    CO2 21 20 20  < >  --    * 167* 137*  < >  --    * 130* 140*  < >  --    CR 2.15* 2.25* 2.57*  < >  --    GFRESTIMATED 32* 31* 26*  < >  --    GFRESTBLACK 39* 37* 32*  < >  --    JACOB 8.0* 8.3* 8.2*  < >  --    ICAW  --   --   --   --  4.2*   MAG 2.3 2.3 2.3  < >  --    PHOS 4.1 4.4 4.4  < >  --    ALBUMIN 1.8*  --  1.8*  < >  --    BILITOTAL 0.4  --  0.5  < >  --    ALKPHOS 168*  --  158*  < >  --    AST 29  --  34  < >  --    ALT 22  --  27  < >  --    < > = values in this interval not displayed.  CBC    Recent Labs  Lab 07/12/17  0342 07/11/17  1624  07/06/17  0316   HGB 8.0* 9.3*  < > 7.1*   WBC 9.0 12.3*  < > 0.8*   PLT 84* 93*  < > 27*   A1C  --   --   --  Canceled, Test credited Below Assay RangeNOTIFIED LEONARD ONEILL AT 0538 ON 7/6/17 BY [facility]   < > = values in this interval not displayed.  COAGS    Recent Labs  Lab 07/10/17  0340 07/08/17  0902 07/06/17  0316   INR 1.17* 1.22* 1.80*   PTT  --  38* 47*      Urinalysis  Recent Labs   Lab Test  07/11/17   1105  07/06/17   1441  06/14/17   1508   04/11/16   1345   COLOR  Yellow  Yellow   --    < >  Yellow   APPEARANCE  Clear  Cloudy   --    < >  Clear   URINEGLC  Negative  Negative   --    < >  30*   URINEBILI  Negative  Negative   --    < >  Negative   URINEKETONE  Negative  Negative   --    < >  Negative   SG  1.009  1.014   --    < >  1.016   UBLD  Negative  Moderate*   --    < >  Small*   URINEPH  5.0  5.0   --    < >  5.0    PROTEIN  10*  30*   --    < >  30*   NITRITE  Negative  Negative   --    < >  Negative   LEUKEST  Negative  Trace*   --    < >  Negative   RBCU  <1  >182*   --    < >  1   WBCU  <1  9*   --    < >  1   UTPG   --    --   1.55*   --   0.41*    < > = values in this interval not displayed.       Cultures:   Blood Cultures x2 7/4/2017 NGTD     Abdominal Fluid Culture 7/4/2017: NGTD    Abdominal Fluid Culture 7/5/17: NGTD    Bronchoalveolar Culture 7/5/17: VRE.     Sputum endotracheal gm stain/culture 7/11/17: >25 PMNs/low power field   Few Mixed gram positive elvie    CT scan:    7/4/2017 CONCLUSION:   1. Right colonic wall thickening suggesting colitis. Follow-up is necessary to confirm resolution in order to exclude colonic mass.  2. Transverse colon is not well distended reducing evaluation for wall thickening.  3. Small amount of ascites.  4. Splenomegaly.  5. Prominent portal and splenic veins. If confirmation of vascular patency is indicated consider follow-up ultrasound of the liver with Doppler.  6. Small right pleural effusion.  7. Stranding in the subcutaneous fat of the ventral pelvis.     Abdominal XR 7/4/2017:   1. No evidence of pneumoperitoneum.  2. Dilated loop of bowel in the central abdomen, likely sigmoid.    XR Chest Portable 1 View 7/4/17:  1. Endotracheal tube tip projects 5.3 cm from the chacorta.  2. Increased bilateral pleural effusions, right greater than left.  3. Unchanged bibasilar patchy opacities.Attestation:    The patient has been seen and evaluated by me.   Vital signs, labs, medications and orders were reviewed.   When obtained, diagnostic images were reviewed by me and interpreted as above.    The care plan was discussed with the multidisciplinary team and I agree with the findings and plan in this note, with any differences recorded in blue.    Immunosuppressive medication management was reviewed and adjusted as reflected in the note and orders.     .

## 2017-07-12 NOTE — PROGRESS NOTES
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 The hospital course has been complicated by development of severe neutropenia (ANC <500), thrombocytopenia, anemia now improved, pleural effusion, EJ and radial occlusion.    On 7/4 he was admitted  Intubated and sedated and  and since fentanyl was d/c on 7/8,  he has bhavya agitated.    Neurology was consulted due to altered mental status.          Overnight:   No major events           Notes reviewed   Nurses: Not following commands. Purposeful movements. Agitation with cares. Also having hypertension with cares, sbp 170-190  Transplant infeltion: continue ertapenem for now  - would image abdomen to rule out any abscess formation as he continues to be febrile - prefer CT scan  - follow weekly CMV PCR while off valcyte  - agree with decrease in immunosuppression                  Medications:     Current Facility-Administered Medications   Medication     tacrolimus (PROGRAF - GENERIC EQUIVALENT) suspension 4.5 mg     senna-docusate (SENOKOT-S;PERICOLACE) 8.6-50 MG per tablet 2 tablet     [START ON 7/13/2017] ertapenem (INVanz) 1 g vial to attach to  mL bag     multivitamins with minerals (CERTAVITE/CEROVITE) liquid 15 mL     heparin sodium PF injection 5,000 Units     polyethylene glycol (MIRALAX/GLYCOLAX) Packet 17 g     parenteral nutrition - ADULT compounded formula     aspirin chewable tablet 81 mg     dextrose 5% infusion     lidocaine (viscous) (XYLOCAINE) 2 % solution 5 mL     dextrose 10 % 1,000 mL infusion     pantoprazole (PROTONIX) suspension 40 mg     insulin 1 unit/mL in saline (NovoLIN, HumuLIN Regular) drip - ADULT IV Infusion     glucose 40 % gel 15-30 g    Or     dextrose 50 % injection 25-50 mL    Or     glucagon  injection 1 mg     fludrocortisone (FLORINEF) tablet 0.1 mg     lidocaine 1 % 1 mL     lidocaine (LMX4) kit     sodium chloride (PF) 0.9% PF flush 3 mL     sodium chloride (PF) 0.9% PF flush 3 mL     ondansetron (ZOFRAN-ODT) ODT tab 4 mg    Or     ondansetron (ZOFRAN) injection 4 mg     bisacodyl (DULCOLAX) Suppository 10 mg     naloxone (NARCAN) injection 0.1-0.4 mg     acetaminophen (TYLENOL) tablet 650 mg    Or     acetaminophen (TYLENOL) Suppository 650 mg     potassium chloride SA (K-DUR/KLOR-CON M) CR tablet 20-40 mEq     potassium chloride (KLOR-CON) Packet 20-40 mEq     potassium chloride 10 mEq in 100 mL intermittent infusion     potassium chloride 10 mEq in 100 mL intermittent infusion with 10 mg lidocaine     potassium chloride 20 mEq in 50 mL intermittent infusion     sodium phosphate 10 mmol in D5W intermittent infusion     sodium phosphate 15 mmol in D5W intermittent infusion     sodium phosphate 20 mmol in D5W intermittent infusion     sodium phosphate 25 mmol in D5W intermittent infusion     magnesium sulfate 2 g in NS intermittent infusion (PharMEDium or FV Cmpd)     magnesium sulfate 4 g in 100 mL sterile water (premade)            Exam     /76  Pulse 101  Temp 98.4  F (36.9  C) (Axillary)  Resp 27  Ht 1.829 m (6')  Wt 93 kg (205 lb 0.4 oz)  SpO2 97%  BMI 27.81 kg/m2BMI    Constitutional : well-built, laying in bed  Head: atraumatic, anicteric. See neuroexam  Eyes: see neuroexam  CVC: sinus on monitor  LUng: intubated. Ventilation Mode: CMV/AC  FiO2 (%): 30 %  Rate Set (breaths/minute): 22 breaths/min  Tidal Volume Set (mL): 500 mL  PEEP (cm H2O): 5 cmH2O  Pressure Support (cm H2O): 7 cmH2O  Oxygen Concentration (%): 30 %  Resp: 27  extremities: purple finger on R hand and purple toes on R foot. Seems to have pain on arm and leg.   Neuroexam  Alert. Nods yes and no. Eyes tracks and make eye contact. Follow simple commands on/off.   Face symmetric  Eyes: motility preserved on horizontal  axis, no nystagmus  Tongue centered  No tremors  Does not collaborate enough for formal strength test, but moves all four.             Data:     Results for orders placed or performed during the hospital encounter of 07/04/17 (from the past 24 hour(s))   Sputum Culture Aerobic Bacterial   Result Value Ref Range    Specimen Description Sputum Endotracheal     Culture Micro Pending     Micro Report Status Pending    Gram stain   Result Value Ref Range    Specimen Description Sputum Endotracheal     Gram Stain       >25 PMNs/low power field  Few Mixed gram positive elvie      Micro Report Status FINAL 07/11/2017    Glucose by meter   Result Value Ref Range    Glucose 135 (H) 70 - 99 mg/dL   US Upper Extremity Arterial Duplex Right   Result Value Ref Range    Radiologist flags Occlusion of the distal radial artery (Urgent)     Narrative    Duplex Doppler ultrasound assessment of the upper extremities arteries  bilaterally 7/11/2017 1:15 PM    Clinical information: Assess for arterial occlusive disease    Ordering provider: Dr. Malone    Technique: B-mode (grayscale) and duplex Doppler ultrasound of the  upper extremity arteries. Velocity measurements obtained with angle  correction at or less than 60 degrees.    FINDINGS:     Peak systolic velocities:    Right Upper Extremity Arteries:     Subclavian: 157 cm/sec  Axillary artery: 119 cm/sec    Brachial proximal: 164 cm/sec   Brachial mid: 187 cm/sec  Brachial antecubital: 157 cm/sec    Radial artery origin: 102 cm/sec  Radial artery mid: 47 cm/sec  Radial artery distal (proximal to branch): 34 cm/sec  Radial artery distal (distal to branch): Occluded  Radial artery branch and distal forearm: 86 cm/s    Ulnar artery proximal: 146 cm/sec    Waveforms are triphasic throughout.      Impression    IMPRESSION:  The radial artery is occluded from the distal forearm to the wrist,  and terminates in distal forearm branches. All other examined arteries  of the right upper  extremity are patent without stenosis, including  the ulnar artery.      [Urgent Result: Occlusion of the distal radial artery]    Finding was identified on 7/11/2017 1:18 PM.     Nitish Malone was contacted by Dr. Saeed at 7/11/2017 1:28 PM and  verbalized understanding of the urgent finding.       I have personally reviewed the examination and initial interpretation  and I agree with the findings.    RENEE DOBBS MD   Glucose by meter   Result Value Ref Range    Glucose 144 (H) 70 - 99 mg/dL   Glucose by meter   Result Value Ref Range    Glucose 155 (H) 70 - 99 mg/dL   Basic metabolic panel   Result Value Ref Range    Sodium 150 (H) 133 - 144 mmol/L    Potassium 3.9 3.4 - 5.3 mmol/L    Chloride 121 (H) 94 - 109 mmol/L    Carbon Dioxide 20 20 - 32 mmol/L    Anion Gap 9 3 - 14 mmol/L    Glucose 167 (H) 70 - 99 mg/dL    Urea Nitrogen 130 (H) 7 - 30 mg/dL    Creatinine 2.25 (H) 0.66 - 1.25 mg/dL    GFR Estimate 31 (L) >60 mL/min/1.7m2    GFR Estimate If Black 37 (L) >60 mL/min/1.7m2    Calcium 8.3 (L) 8.5 - 10.1 mg/dL   Magnesium   Result Value Ref Range    Magnesium 2.3 1.6 - 2.3 mg/dL   Phosphorus   Result Value Ref Range    Phosphorus 4.4 2.5 - 4.5 mg/dL   CBC with platelets differential   Result Value Ref Range    WBC 12.3 (H) 4.0 - 11.0 10e9/L    RBC Count 3.27 (L) 4.4 - 5.9 10e12/L    Hemoglobin 9.3 (L) 13.3 - 17.7 g/dL    Hematocrit 28.9 (L) 40.0 - 53.0 %    MCV 88 78 - 100 fl    MCH 28.4 26.5 - 33.0 pg    MCHC 32.2 31.5 - 36.5 g/dL    RDW 14.9 10.0 - 15.0 %    Platelet Count 93 (L) 150 - 450 10e9/L    Diff Method Manual Differential     % Neutrophils 88.0 %    % Lymphocytes 2.6 %    % Monocytes 5.1 %    % Eosinophils 2.6 %    % Basophils 0.0 %    % Myelocytes 1.7 %    Absolute Neutrophil 10.8 (H) 1.6 - 8.3 10e9/L    Absolute Lymphocytes 0.3 (L) 0.8 - 5.3 10e9/L    Absolute Monocytes 0.6 0.0 - 1.3 10e9/L    Absolute Eosinophils 0.3 0.0 - 0.7 10e9/L    Absolute Basophils 0.0 0.0 - 0.2 10e9/L    Absolute  Myelocytes 0.2 (H) 0 10e9/L    Anisocytosis Slight     Poikilocytosis Slight     Ovalocytes Slight     Microcytes Present     Platelet Estimate Confirming automated cell count    XR Chest Port 1 View    Narrative    Examination: XR CHEST PORT 1 , 7/11/2017 5:02 PM    Comparison: 7/11/2017 at 1:27 AM    History: chest exam 6 hours post water seal    Findings: Endotracheal tube tip at the level of the mid thoracic  trachea. Enteric tubes extend into the left upper quadrant, tips not  included in the field-of-view. Right IJ central venous catheter tip at  the level of the mid SVC. Right basilar chest tube is stable in  position. The cardiomediastinal silhouette is stable, partially  obscured. Bilateral pleural effusions and associated bibasilar  opacities are not significantly changed. No appreciable pneumothorax.      Impression    Impression:   1. No appreciable pneumothorax.  2. Bilateral pleural effusions and associated perihilar/bibasilar  opacities are not significantly changed.    I have personally reviewed the examination and initial interpretation  and I agree with the findings.    DIAZ NORMAN MD   Glucose by meter   Result Value Ref Range    Glucose 149 (H) 70 - 99 mg/dL   Glucose by meter   Result Value Ref Range    Glucose 118 (H) 70 - 99 mg/dL   Glucose by meter   Result Value Ref Range    Glucose 115 (H) 70 - 99 mg/dL   Glucose by meter   Result Value Ref Range    Glucose 151 (H) 70 - 99 mg/dL   XR Chest Port 1 View    Narrative    Examination: XR CHEST PORT 1 , 7/12/2017 2:06 AM    Comparison: 7/11/2017 at 16:57 PM.    History: intubated    Findings: Endotracheal tube tip in the mid thoracic trachea. Unchanged  partially visualized enteric tubes. Right IJ central line tip over  distal SVC. Unchanged right basilar chest tube.    Stable cardiac silhouette. Persistent perihilar and bibasilar streaky  opacities. Stable pleural effusions and associated basilar opacities.  No appreciable  pneumothorax.      Impression    Impression:   1.  Bilateral pleural effusions and associated perihilar/bibasilar  opacities are not significantly changed.  2.  Stable support devices.    I have personally reviewed the examination and initial interpretation  and I agree with the findings.    DIAZ NORMAN MD   Basic metabolic panel   Result Value Ref Range    Sodium 150 (H) 133 - 144 mmol/L    Potassium 4.0 3.4 - 5.3 mmol/L    Chloride 120 (H) 94 - 109 mmol/L    Carbon Dioxide 21 20 - 32 mmol/L    Anion Gap 8 3 - 14 mmol/L    Glucose 188 (H) 70 - 99 mg/dL    Urea Nitrogen 121 (H) 7 - 30 mg/dL    Creatinine 2.15 (H) 0.66 - 1.25 mg/dL    GFR Estimate 32 (L) >60 mL/min/1.7m2    GFR Estimate If Black 39 (L) >60 mL/min/1.7m2    Calcium 8.0 (L) 8.5 - 10.1 mg/dL   Magnesium   Result Value Ref Range    Magnesium 2.3 1.6 - 2.3 mg/dL   Phosphorus   Result Value Ref Range    Phosphorus 4.1 2.5 - 4.5 mg/dL   CBC with platelets differential   Result Value Ref Range    WBC 9.0 4.0 - 11.0 10e9/L    RBC Count 2.92 (L) 4.4 - 5.9 10e12/L    Hemoglobin 8.0 (L) 13.3 - 17.7 g/dL    Hematocrit 25.5 (L) 40.0 - 53.0 %    MCV 87 78 - 100 fl    MCH 27.4 26.5 - 33.0 pg    MCHC 31.4 (L) 31.5 - 36.5 g/dL    RDW 14.9 10.0 - 15.0 %    Platelet Count 84 (L) 150 - 450 10e9/L    Diff Method Manual Differential     % Neutrophils 90.2 %    % Lymphocytes 2.7 %    % Monocytes 0.0 %    % Eosinophils 0.9 %    % Basophils 0.0 %    % Metamyelocytes 6.2 %    Nucleated RBCs 1 (H) 0 /100    Absolute Neutrophil 8.1 1.6 - 8.3 10e9/L    Absolute Lymphocytes 0.2 (L) 0.8 - 5.3 10e9/L    Absolute Monocytes 0.0 0.0 - 1.3 10e9/L    Absolute Eosinophils 0.1 0.0 - 0.7 10e9/L    Absolute Basophils 0.0 0.0 - 0.2 10e9/L    Absolute Metamyelocytes 0.6 (H) 0 10e9/L    Absolute Nucleated RBC 0.1     RBC Morphology Normal    Hepatic panel   Result Value Ref Range    Bilirubin Direct 0.2 0.0 - 0.2 mg/dL    Bilirubin Total 0.4 0.2 - 1.3 mg/dL    Albumin 1.8 (L) 3.4 - 5.0 g/dL     Protein Total 5.0 (L) 6.8 - 8.8 g/dL    Alkaline Phosphatase 168 (H) 40 - 150 U/L    ALT 22 0 - 70 U/L    AST 29 0 - 45 U/L   Triglycerides   Result Value Ref Range    Triglycerides 114 <150 mg/dL   Glucose by meter   Result Value Ref Range    Glucose 174 (H) 70 - 99 mg/dL   Glucose by meter   Result Value Ref Range    Glucose 152 (H) 70 - 99 mg/dL   Glucose by meter   Result Value Ref Range    Glucose 138 (H) 70 - 99 mg/dL   Glucose by meter   Result Value Ref Range    Glucose 161 (H) 70 - 99 mg/dL   Glucose by meter   Result Value Ref Range    Glucose 138 (H) 70 - 99 mg/dL   Sodium random urine   Result Value Ref Range    Sodium Urine mmol/L 22 mmol/L   Potassium random urine   Result Value Ref Range    Potassium Urine mmol/L 11 mmol/L   Glucose by meter   Result Value Ref Range    Glucose 163 (H) 70 - 99 mg/dL   Glucose by meter   Result Value Ref Range    Glucose 136 (H) 70 - 99 mg/dL     *Note: Due to a large number of results and/or encounters for the requested time period, some results have not been displayed. A complete set of results can be found in Results Review.              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 Likely toxic - metabolic in context of EJ, sepsis and recent sedation. Exam seems to be non focal and non suggestive of seizures.      Neurology will keep following peripherally. .        Attestation:  Patient seen and discussed with Dr. Leelee Sands    PGY4 NEurology  [id number]

## 2017-07-12 NOTE — PROCEDURES
Procedure Report  7/12/2017    Pre-operative Diagnosis: Fever   Post-operative Diagnosis: Mucous plug   Procedure: Bronchoscopy, bronchoalveolar lavage   Staff: Alexei  Resident: Natanael   EBL: 0  Findings: Left lower lobe mucous plug, no purulent fluid. Otherwise clean airway, no hyperemia.  Specimen: Left lower lobe,   Complication: None     [facility] [id number]

## 2017-07-12 NOTE — PROGRESS NOTES
Nephrology Progress Note  07/12/2017         Camacho Bhagat is a 51 year old man with h/o cirrhosis s/p OLT 3/2017 who is admitted with neutropenia and colitis, s/p exlap, no bowel removed but did have large insensible losses and shock requiring pressor support, EJ on CKD due to sepsis but has avoided need for HD.      Interval History  Cr down today to 2.1, 3.6L of UOP without diuretics although with substantial free H2O loss due to BUN diuresis, overall improving kidney fx.  Team is already giving 150cc/hr, will add some parenterally as FWD is still ~3L with ongoing UOP losses which are mostly free H2O by free electrolyte clearance, will follow labs in am although overall is recovering kidney fx.          Assessment & Recommendations:   EJ on CKD-Baseline Cr of ~1.5 since transplant, peaked at ~3 but now slowly on downtrend 2.1 today, BUN down today as well.  Able to be net even without diuretics (slightly positive but wt down with insensible losses).  Slowly recovering kidney fx, will continue to follow, no indication for HD.                           -Stable/Improving Cr, will follow.      Volume status-Some peripheral edema, has responded well to diuretics in previous days, now with 3+L of UOP without diuretics yesterday, much of it is free H2O due to BUN diuresis (see Na section below).  Will need free H2O to correct but should have minimal impact on volume status, aiming for net even to slightly negative daily.      Electrolytes/pH-Bicarb stable at ~21, K+ 4.0 this am.      Hypernatremia-Remains elevated but stable at 150 despite 150cc/hr D5, still~3L of free water to correct, team will add parenterally.  I did order free water electrolyte clearance to quantify free H2O loss, Urine K=11, Urine Sodium=22, UOP is primarily BUN diuresis with ~20% of UOP being isotonic and the rest being free water, which explains his persistent hypernatremia despite 150cc/hr of D5.  Should improve as BUN reduces, is down today  although still elevated at 121.       ID-Admitted with colitis, had + VRE culture, recent cultures NGTD.  Did have fever overnight, WBC=10.       OLT-On tacro, level=6, cellcept on hold due to neutropenia.          Endocrine-On prior to admission dose of Florinef-on H&P listed for treatment of elevated potassium.        Anemia-Hgb 8.5, stable.        Nutrition-On TPN, electrolytes stable.        Seen and discussed with Dr Jackson      Recommendations were communicated to primary team in person.         Esequiel William  Clinical Nurse Specialist  [phone]    Review of Systems:   I reviewed the following systems:  ROS not done due to vent/sedation.      Physical Exam:   I/O last 3 completed shifts:  In: 5224.94 [I.V.:3126.09; NG/GT:450]  Out: 4085 [Urine:3560; Emesis/NG output:475; Chest Tube:50]   /76  Pulse 101  Temp 100.3  F (37.9  C) (Axillary)  Resp 27  Ht 1.829 m (6')  Wt 93 kg (205 lb 0.4 oz)  SpO2 97%  BMI 27.81 kg/m2     GENERAL APPEARANCE: intubated, sedated  EYES:  no scleral icterus, pupils equal  HENT: ET tube  PULM: lungs clear to auscultation bilaterally, equal air movement, no clubbing  CV: regular rhythm, normal rate, no rub     -JVP not elevated     -edema 1+  Neuro - sedated  Lines right IJ TLC, no HD access.    Labs:   All labs reviewed by me  Electrolytes/Renal -   Recent Labs   Lab Test  07/12/17 0342 07/11/17 1624 07/11/17   0328   NA  150*  150*  150*   POTASSIUM  4.0  3.9  3.8   CHLORIDE  120*  121*  120*   CO2  21  20  20   BUN  121*  130*  140*   CR  2.15*  2.25*  2.57*   GLC  188*  167*  137*   JACOB  8.0*  8.3*  8.2*   MAG  2.3  2.3  2.3   PHOS  4.1  4.4  4.4       CBC -   Recent Labs   Lab Test  07/12/17 0342 07/11/17   1624 07/11/17   0328   WBC  9.0  12.3*  11.7*   HGB  8.0*  9.3*  8.5*   PLT  84*  93*  80*       LFTs -   Recent Labs   Lab Test  07/12/17   0342  07/11/17   0328  07/10/17   0340   ALKPHOS  168*  158*  116   BILITOTAL  0.4  0.5  0.7   ALT  22  27  34    AST  29  34  67*   PROTTOTAL  5.0*  5.0*  5.0*   ALBUMIN  1.8*  1.8*  1.8*       Iron Panel -   Recent Labs   Lab Test  02/08/16   1144   IRON  93   IRONSAT  41           Current Medications:    tacrolimus  4.5 mg Oral BID IS     heparin  5,000 Units Subcutaneous Q8H     senna-docusate  1 tablet Oral At Bedtime     polyethylene glycol  17 g Oral Daily     lipids  250 mL Intravenous Once per day on Mon Wed Fri     aspirin  81 mg Oral Daily     lidocaine (viscous)  5 mL Topical Once     pantoprazole  40 mg Oral or Feeding Tube Daily     ertapenem (INVanz) IV  1 g Intravenous Q24H     fludrocortisone  0.1 mg Oral Daily     sodium chloride (PF)  3 mL Intracatheter Q8H       parenteral nutrition - ADULT compounded formula 45 mL/hr at 07/11/17 2057     D5W 150 mL/hr at 07/12/17 0726     IV fluid REPLACEMENT ONLY       insulin (regular) 8 Units/hr (07/12/17 0910)     Attestation:  This patient has been seen, examined and evaluated by me, Char Jackson as a shared visit with the NP/PA above.     In brief:  Camacho Bhagat is a 51 year old male S/P OLT 3/2017 adm with typhlitis now S/P X lap.   Physical exam:  Vitals along with Ins/outs reviewed.     My key findings:  CV: RSR  Pulm: clear bilaterally         Key management decisions made by me:   1.  EJ: does not need HD  2.  Hypernatremia: needs additional free water including D5 and orally as tolerated     Plan:  Monitor Cr daily and assess for HD  Increase free water admin     I have reviewed today's vitals and labs.     Char Jackson MD

## 2017-07-12 NOTE — PLAN OF CARE
Problem: Goal Outcome Summary  Goal: Goal Outcome Summary     OT-4a: Cx- attempted to see pt this PM, but pt with conflicting procedure.

## 2017-07-13 ENCOUNTER — APPOINTMENT (OUTPATIENT)
Dept: CT IMAGING | Facility: CLINIC | Age: 52
End: 2017-07-13
Attending: NURSE PRACTITIONER
Payer: COMMERCIAL

## 2017-07-13 LAB
ABO + RH BLD: NORMAL
ABO + RH BLD: NORMAL
ALBUMIN SERPL-MCNC: 1.8 G/DL (ref 3.4–5)
ALP SERPL-CCNC: 146 U/L (ref 40–150)
ALT SERPL W P-5'-P-CCNC: 26 U/L (ref 0–70)
ANION GAP SERPL CALCULATED.3IONS-SCNC: 8 MMOL/L (ref 3–14)
ANION GAP SERPL CALCULATED.3IONS-SCNC: 9 MMOL/L (ref 3–14)
AST SERPL W P-5'-P-CCNC: 44 U/L (ref 0–45)
BASOPHILS # BLD AUTO: 0 10E9/L (ref 0–0.2)
BASOPHILS # BLD AUTO: 0 10E9/L (ref 0–0.2)
BASOPHILS NFR BLD AUTO: 0.1 %
BASOPHILS NFR BLD AUTO: 0.3 %
BILIRUB DIRECT SERPL-MCNC: 0.2 MG/DL (ref 0–0.2)
BILIRUB SERPL-MCNC: 0.4 MG/DL (ref 0.2–1.3)
BLD GP AB SCN SERPL QL: NORMAL
BLOOD BANK CMNT PATIENT-IMP: NORMAL
BUN SERPL-MCNC: 88 MG/DL (ref 7–30)
BUN SERPL-MCNC: 99 MG/DL (ref 7–30)
CALCIUM SERPL-MCNC: 7.7 MG/DL (ref 8.5–10.1)
CALCIUM SERPL-MCNC: 7.8 MG/DL (ref 8.5–10.1)
CHLORIDE SERPL-SCNC: 115 MMOL/L (ref 94–109)
CHLORIDE SERPL-SCNC: 120 MMOL/L (ref 94–109)
CO2 SERPL-SCNC: 20 MMOL/L (ref 20–32)
CO2 SERPL-SCNC: 21 MMOL/L (ref 20–32)
CREAT SERPL-MCNC: 1.75 MG/DL (ref 0.66–1.25)
CREAT SERPL-MCNC: 1.91 MG/DL (ref 0.66–1.25)
DIFFERENTIAL METHOD BLD: ABNORMAL
DIFFERENTIAL METHOD BLD: ABNORMAL
EOSINOPHIL # BLD AUTO: 0.1 10E9/L (ref 0–0.7)
EOSINOPHIL # BLD AUTO: 0.1 10E9/L (ref 0–0.7)
EOSINOPHIL NFR BLD AUTO: 1.3 %
EOSINOPHIL NFR BLD AUTO: 1.4 %
ERYTHROCYTE [DISTWIDTH] IN BLOOD BY AUTOMATED COUNT: 14.6 % (ref 10–15)
ERYTHROCYTE [DISTWIDTH] IN BLOOD BY AUTOMATED COUNT: 14.8 % (ref 10–15)
GFR SERPL CREATININE-BSD FRML MDRD: 37 ML/MIN/1.7M2
GFR SERPL CREATININE-BSD FRML MDRD: 41 ML/MIN/1.7M2
GLUCOSE BLDC GLUCOMTR-MCNC: 103 MG/DL (ref 70–99)
GLUCOSE BLDC GLUCOMTR-MCNC: 106 MG/DL (ref 70–99)
GLUCOSE BLDC GLUCOMTR-MCNC: 115 MG/DL (ref 70–99)
GLUCOSE BLDC GLUCOMTR-MCNC: 119 MG/DL (ref 70–99)
GLUCOSE BLDC GLUCOMTR-MCNC: 119 MG/DL (ref 70–99)
GLUCOSE BLDC GLUCOMTR-MCNC: 121 MG/DL (ref 70–99)
GLUCOSE BLDC GLUCOMTR-MCNC: 122 MG/DL (ref 70–99)
GLUCOSE BLDC GLUCOMTR-MCNC: 124 MG/DL (ref 70–99)
GLUCOSE BLDC GLUCOMTR-MCNC: 130 MG/DL (ref 70–99)
GLUCOSE BLDC GLUCOMTR-MCNC: 130 MG/DL (ref 70–99)
GLUCOSE BLDC GLUCOMTR-MCNC: 132 MG/DL (ref 70–99)
GLUCOSE BLDC GLUCOMTR-MCNC: 135 MG/DL (ref 70–99)
GLUCOSE BLDC GLUCOMTR-MCNC: 137 MG/DL (ref 70–99)
GLUCOSE BLDC GLUCOMTR-MCNC: 143 MG/DL (ref 70–99)
GLUCOSE BLDC GLUCOMTR-MCNC: 179 MG/DL (ref 70–99)
GLUCOSE BLDC GLUCOMTR-MCNC: 180 MG/DL (ref 70–99)
GLUCOSE BLDC GLUCOMTR-MCNC: 92 MG/DL (ref 70–99)
GLUCOSE BLDC GLUCOMTR-MCNC: 93 MG/DL (ref 70–99)
GLUCOSE SERPL-MCNC: 162 MG/DL (ref 70–99)
GLUCOSE SERPL-MCNC: 99 MG/DL (ref 70–99)
HCT VFR BLD AUTO: 24.1 % (ref 40–53)
HCT VFR BLD AUTO: 24.3 % (ref 40–53)
HGB BLD-MCNC: 7.6 G/DL (ref 13.3–17.7)
HGB BLD-MCNC: 7.8 G/DL (ref 13.3–17.7)
IMM GRANULOCYTES # BLD: 0.1 10E9/L (ref 0–0.4)
IMM GRANULOCYTES # BLD: 0.1 10E9/L (ref 0–0.4)
IMM GRANULOCYTES NFR BLD: 1.5 %
IMM GRANULOCYTES NFR BLD: 1.7 %
LYMPHOCYTES # BLD AUTO: 0.5 10E9/L (ref 0.8–5.3)
LYMPHOCYTES # BLD AUTO: 0.8 10E9/L (ref 0.8–5.3)
LYMPHOCYTES NFR BLD AUTO: 10.4 %
LYMPHOCYTES NFR BLD AUTO: 6.9 %
MAGNESIUM SERPL-MCNC: 2.1 MG/DL (ref 1.6–2.3)
MAGNESIUM SERPL-MCNC: 2.3 MG/DL (ref 1.6–2.3)
MCH RBC QN AUTO: 27.9 PG (ref 26.5–33)
MCH RBC QN AUTO: 28.1 PG (ref 26.5–33)
MCHC RBC AUTO-ENTMCNC: 31.5 G/DL (ref 31.5–36.5)
MCHC RBC AUTO-ENTMCNC: 32.1 G/DL (ref 31.5–36.5)
MCV RBC AUTO: 87 FL (ref 78–100)
MCV RBC AUTO: 89 FL (ref 78–100)
MONOCYTES # BLD AUTO: 0.3 10E9/L (ref 0–1.3)
MONOCYTES # BLD AUTO: 0.4 10E9/L (ref 0–1.3)
MONOCYTES NFR BLD AUTO: 3.4 %
MONOCYTES NFR BLD AUTO: 5.9 %
NEUTROPHILS # BLD AUTO: 6 10E9/L (ref 1.6–8.3)
NEUTROPHILS # BLD AUTO: 6.4 10E9/L (ref 1.6–8.3)
NEUTROPHILS NFR BLD AUTO: 83.1 %
NEUTROPHILS NFR BLD AUTO: 84 %
NRBC # BLD AUTO: 0 10*3/UL
NRBC # BLD AUTO: 0 10*3/UL
NRBC BLD AUTO-RTO: 0 /100
NRBC BLD AUTO-RTO: 0 /100
PHOSPHATE SERPL-MCNC: 4 MG/DL (ref 2.5–4.5)
PHOSPHATE SERPL-MCNC: 4.2 MG/DL (ref 2.5–4.5)
PLATELET # BLD AUTO: 100 10E9/L (ref 150–450)
PLATELET # BLD AUTO: 96 10E9/L (ref 150–450)
POTASSIUM SERPL-SCNC: 3.9 MMOL/L (ref 3.4–5.3)
POTASSIUM SERPL-SCNC: 4.3 MMOL/L (ref 3.4–5.3)
PROT SERPL-MCNC: 5 G/DL (ref 6.8–8.8)
RBC # BLD AUTO: 2.72 10E12/L (ref 4.4–5.9)
RBC # BLD AUTO: 2.78 10E12/L (ref 4.4–5.9)
SODIUM SERPL-SCNC: 143 MMOL/L (ref 133–144)
SODIUM SERPL-SCNC: 147 MMOL/L (ref 133–144)
SODIUM SERPL-SCNC: 149 MMOL/L (ref 133–144)
SPECIMEN EXP DATE BLD: NORMAL
TACROLIMUS BLD-MCNC: 7.6 UG/L (ref 5–15)
TME LAST DOSE: NORMAL H
WBC # BLD AUTO: 7.1 10E9/L (ref 4–11)
WBC # BLD AUTO: 7.7 10E9/L (ref 4–11)

## 2017-07-13 PROCEDURE — 80048 BASIC METABOLIC PNL TOTAL CA: CPT | Performed by: SURGERY

## 2017-07-13 PROCEDURE — 40000275 ZZH STATISTIC RCP TIME EA 10 MIN

## 2017-07-13 PROCEDURE — 84132 ASSAY OF SERUM POTASSIUM: CPT | Performed by: TRANSPLANT SURGERY

## 2017-07-13 PROCEDURE — 25000132 ZZH RX MED GY IP 250 OP 250 PS 637: Performed by: STUDENT IN AN ORGANIZED HEALTH CARE EDUCATION/TRAINING PROGRAM

## 2017-07-13 PROCEDURE — 25000128 H RX IP 250 OP 636: Performed by: NURSE PRACTITIONER

## 2017-07-13 PROCEDURE — 86901 BLOOD TYPING SEROLOGIC RH(D): CPT | Performed by: STUDENT IN AN ORGANIZED HEALTH CARE EDUCATION/TRAINING PROGRAM

## 2017-07-13 PROCEDURE — 83735 ASSAY OF MAGNESIUM: CPT | Performed by: SURGERY

## 2017-07-13 PROCEDURE — 83735 ASSAY OF MAGNESIUM: CPT | Performed by: TRANSPLANT SURGERY

## 2017-07-13 PROCEDURE — 94003 VENT MGMT INPAT SUBQ DAY: CPT

## 2017-07-13 PROCEDURE — 40000014 ZZH STATISTIC ARTERIAL MONITORING DAILY

## 2017-07-13 PROCEDURE — 80076 HEPATIC FUNCTION PANEL: CPT | Performed by: SURGERY

## 2017-07-13 PROCEDURE — 25000132 ZZH RX MED GY IP 250 OP 250 PS 637

## 2017-07-13 PROCEDURE — 40000141 ZZH STATISTIC PERIPHERAL IV START W/O US GUIDANCE

## 2017-07-13 PROCEDURE — 86900 BLOOD TYPING SEROLOGIC ABO: CPT | Performed by: STUDENT IN AN ORGANIZED HEALTH CARE EDUCATION/TRAINING PROGRAM

## 2017-07-13 PROCEDURE — 40000281 ZZH STATISTIC TRANSPORT TIME EA 15 MIN

## 2017-07-13 PROCEDURE — 74176 CT ABD & PELVIS W/O CONTRAST: CPT

## 2017-07-13 PROCEDURE — 25000125 ZZHC RX 250: Performed by: PHYSICIAN ASSISTANT

## 2017-07-13 PROCEDURE — 87075 CULTR BACTERIA EXCEPT BLOOD: CPT | Performed by: SURGERY

## 2017-07-13 PROCEDURE — 27210437 ZZH NUTRITION PRODUCT SEMIELEM INTERMED LITER

## 2017-07-13 PROCEDURE — 25000131 ZZH RX MED GY IP 250 OP 636 PS 637: Performed by: PHYSICIAN ASSISTANT

## 2017-07-13 PROCEDURE — 25000128 H RX IP 250 OP 636: Performed by: STUDENT IN AN ORGANIZED HEALTH CARE EDUCATION/TRAINING PROGRAM

## 2017-07-13 PROCEDURE — 84100 ASSAY OF PHOSPHORUS: CPT | Performed by: TRANSPLANT SURGERY

## 2017-07-13 PROCEDURE — 25000132 ZZH RX MED GY IP 250 OP 250 PS 637: Performed by: SURGERY

## 2017-07-13 PROCEDURE — 20000004 ZZH R&B ICU UMMC

## 2017-07-13 PROCEDURE — 87070 CULTURE OTHR SPECIMN AEROBIC: CPT | Performed by: SURGERY

## 2017-07-13 PROCEDURE — 86850 RBC ANTIBODY SCREEN: CPT | Performed by: STUDENT IN AN ORGANIZED HEALTH CARE EDUCATION/TRAINING PROGRAM

## 2017-07-13 PROCEDURE — 84295 ASSAY OF SERUM SODIUM: CPT | Performed by: SURGERY

## 2017-07-13 PROCEDURE — 25000128 H RX IP 250 OP 636

## 2017-07-13 PROCEDURE — 25000132 ZZH RX MED GY IP 250 OP 250 PS 637: Performed by: NURSE PRACTITIONER

## 2017-07-13 PROCEDURE — 00000146 ZZHCL STATISTIC GLUCOSE BY METER IP

## 2017-07-13 PROCEDURE — 84100 ASSAY OF PHOSPHORUS: CPT | Performed by: SURGERY

## 2017-07-13 PROCEDURE — 80197 ASSAY OF TACROLIMUS: CPT | Performed by: PHYSICIAN ASSISTANT

## 2017-07-13 PROCEDURE — 85025 COMPLETE CBC W/AUTO DIFF WBC: CPT | Performed by: SURGERY

## 2017-07-13 PROCEDURE — 36415 COLL VENOUS BLD VENIPUNCTURE: CPT | Performed by: TRANSPLANT SURGERY

## 2017-07-13 RX ORDER — POLYETHYLENE GLYCOL 3350 17 G/17G
17 POWDER, FOR SOLUTION ORAL DAILY PRN
Status: DISCONTINUED | OUTPATIENT
Start: 2017-07-13 | End: 2017-07-31

## 2017-07-13 RX ORDER — AMOXICILLIN 250 MG
2 CAPSULE ORAL 2 TIMES DAILY PRN
Status: DISCONTINUED | OUTPATIENT
Start: 2017-07-13 | End: 2017-08-12

## 2017-07-13 RX ORDER — HYDROMORPHONE HYDROCHLORIDE 1 MG/ML
INJECTION, SOLUTION INTRAMUSCULAR; INTRAVENOUS; SUBCUTANEOUS
Status: COMPLETED
Start: 2017-07-13 | End: 2017-07-13

## 2017-07-13 RX ORDER — QUETIAPINE FUMARATE 25 MG/1
25 TABLET, FILM COATED ORAL AT BEDTIME
Status: DISCONTINUED | OUTPATIENT
Start: 2017-07-13 | End: 2017-07-16

## 2017-07-13 RX ORDER — HYDROMORPHONE HYDROCHLORIDE 1 MG/ML
.3-.5 INJECTION, SOLUTION INTRAMUSCULAR; INTRAVENOUS; SUBCUTANEOUS
Status: DISCONTINUED | OUTPATIENT
Start: 2017-07-13 | End: 2017-07-16

## 2017-07-13 RX ADMIN — HEPARIN SODIUM 5000 UNITS: 5000 INJECTION, SOLUTION INTRAVENOUS; SUBCUTANEOUS at 21:05

## 2017-07-13 RX ADMIN — ACETAMINOPHEN 650 MG: 325 TABLET, FILM COATED ORAL at 09:15

## 2017-07-13 RX ADMIN — HEPARIN SODIUM 5000 UNITS: 5000 INJECTION, SOLUTION INTRAVENOUS; SUBCUTANEOUS at 11:35

## 2017-07-13 RX ADMIN — FLUDROCORTISONE ACETATE 0.1 MG: 0.1 TABLET ORAL at 08:41

## 2017-07-13 RX ADMIN — PANTOPRAZOLE SODIUM 40 MG: 40 TABLET, DELAYED RELEASE ORAL at 08:42

## 2017-07-13 RX ADMIN — POTASSIUM CHLORIDE 20 MEQ: 1.5 POWDER, FOR SOLUTION ORAL at 05:17

## 2017-07-13 RX ADMIN — MULTIVIT AND MINERALS-FERROUS GLUCONATE 9 MG IRON/15 ML ORAL LIQUID 15 ML: at 08:41

## 2017-07-13 RX ADMIN — DEXTROSE MONOHYDRATE: 50 INJECTION, SOLUTION INTRAVENOUS at 09:57

## 2017-07-13 RX ADMIN — OXYCODONE HYDROCHLORIDE 2.5 MG: 5 TABLET ORAL at 21:05

## 2017-07-13 RX ADMIN — TACROLIMUS 4.5 MG: 5 CAPSULE ORAL at 08:42

## 2017-07-13 RX ADMIN — QUETIAPINE FUMARATE 25 MG: 25 TABLET, FILM COATED ORAL at 21:04

## 2017-07-13 RX ADMIN — DEXTROSE MONOHYDRATE: 50 INJECTION, SOLUTION INTRAVENOUS at 16:50

## 2017-07-13 RX ADMIN — ERTAPENEM SODIUM 1 G: 1 INJECTION, POWDER, LYOPHILIZED, FOR SOLUTION INTRAMUSCULAR; INTRAVENOUS at 09:16

## 2017-07-13 RX ADMIN — DEXTROSE MONOHYDRATE: 50 INJECTION, SOLUTION INTRAVENOUS at 05:17

## 2017-07-13 RX ADMIN — HUMAN INSULIN 7 UNITS/HR: 100 INJECTION, SOLUTION SUBCUTANEOUS at 09:17

## 2017-07-13 RX ADMIN — HUMAN INSULIN 7 UNITS/HR: 100 INJECTION, SOLUTION SUBCUTANEOUS at 16:50

## 2017-07-13 RX ADMIN — HUMAN INSULIN 5 UNITS/HR: 100 INJECTION, SOLUTION SUBCUTANEOUS at 05:17

## 2017-07-13 RX ADMIN — HYDROMORPHONE HYDROCHLORIDE 0.5 MG: 1 INJECTION, SOLUTION INTRAMUSCULAR; INTRAVENOUS; SUBCUTANEOUS at 14:33

## 2017-07-13 RX ADMIN — ACETAMINOPHEN 650 MG: 325 TABLET, FILM COATED ORAL at 16:50

## 2017-07-13 RX ADMIN — TACROLIMUS 5 MG: 5 CAPSULE ORAL at 18:04

## 2017-07-13 RX ADMIN — MIDAZOLAM 2 MG: 1 INJECTION INTRAMUSCULAR; INTRAVENOUS at 14:33

## 2017-07-13 RX ADMIN — ASPIRIN 81 MG CHEWABLE TABLET 81 MG: 81 TABLET CHEWABLE at 08:41

## 2017-07-13 RX ADMIN — HUMAN INSULIN 10 UNITS/HR: 100 INJECTION, SOLUTION SUBCUTANEOUS at 00:51

## 2017-07-13 RX ADMIN — HEPARIN SODIUM 5000 UNITS: 5000 INJECTION, SOLUTION INTRAVENOUS; SUBCUTANEOUS at 03:46

## 2017-07-13 NOTE — PROGRESS NOTES
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     Graft Function: Good function. US liver normal doppler. TB/ALT/AST WNL, Alk phos elevated. Ggt normal.   Immunosuppression Management:   CellCept 250 mg BID. Held since 6/27/17 due to neutropenia. Continue to hold.   Tac goal level 5-8. 7/10 level 4.9 (12 hour trough). Since MMF held will aim for goal ~8.  Increased Prograf 4.5 mg BID. 1 mg x 1 extra dose yesterday. Level 7.6, 10 hr trough. Increase to 5 mg BID. Repeat tac level tomorrow for 12 hour trough, timed level to be drawn at 0600.   Cardiorespiratory: Intubated for ex lap, initially requiring pressor support. HDS. Moderate right sided pleural effusion. Chest tube placed 7/9 that was removed 7/12. Vent management per SICU team. Continuing PS trials, neuro status limiting. All sedating medications held.  Hematology: Pancytopenia, acute on chronic, secondary to medications. MMF and bactrim held (since 6/27). Valcyte held on admission. Neupogen 480 mcg given on 7/4 to 7/7,  CMV seronegative and donor seropositive. 7/3 CMV PCR < 137. 7/10 CMV . Continue to hold MMF, bactrim and valcyte.   GI: CT from [place] demonstrating right sided colonic inflammation, AXR on admission with dilated loop of bowel in central abdomen, felt likely to sigmoid. Initial impression consistent with Typhlitis however given worsening abdominal pain on examination, hypotension and increasing lactate levels, elected to proceed with exploratory laparotomy. Findings on reexploration demonstrating persistent inflammation of the right colon without  evidence of ischemia. Lactic acid normalized.  NJ placed TF Peptamen titrated to goal. +BMs.   Fluid/Electrolytes/Renal:  EJ, secondary to hypoperfusion, sepsis. Cr 2.0 (2.1). BUN trending down. Good UO. HypoNa, receiving D5 IVF. Nephrology consulting. Hyperkalemia, PTA on florinef. K stable.   Endocrine: DM II, on insulin gtt.   Infectious disease: Started on Zosyn, Vancomycin, Flagyl, Micafungin on admission (7/4/2017). 7/5 Blood cultures, no growth thus far. Abdominal fluid culture collected intraoperatively, gram stain with no organisms, fungal and bacterial culture, GPCs. Bronchoalveolar lavage growing VRE, colonization. Tx ID consulted.  -Fever > 101 for the past 3 days, WBC 7.7, decreasing. Pan cx (urine, blood, sputum) negative thus far. Continue ertapenem. ID consulted. Plan to exhchange lines today. CT scan w/o contrast to eval for abscess.    -CMV viremia - CMV D+R-, low viral count. IS reduced (MMF held). Monitor for now, weekly CMV PCR.  Neuro: Delirium, hold, limit medications that have CNS SE. Oxycodone PRN pain. Neurology consulted- dx likely toxic metabolic encephalopathy, improving slowly.  Access: R IJ  Vascular: Right Arterial line clot 2/2 previous art line. Vascular consulted. Recommend ASA only. Some evidence of microvascular injury in digits (toes) this is most likely due to injury while patient was on pressor therapy and sepsis.   Prophylaxis: PPI, DVT-SCD  Disposition: SICU    Medical Decision Making: Medium  Admit 22547 (moderate level decision making)    PILLO/Fellow/Resident Provider: Ada Driver PA-C    Faculty: Chaparro Anderson M.D.      __________________________________________________________________  Transplant History:.  3/4/2017 DD OLT with Dr. Tran (Liver), Postoperative day: 131     Interval History: History is obtained from EMR and nursing staff.  Overnight events: No acute events o/n. Multiple stools    ROS:   A 10-point review of systems was negative except as noted  above.    Meds:    QUEtiapine  25 mg Oral At Bedtime     iohexol  50 mL Oral Once     tacrolimus  4.5 mg Oral BID IS     ertapenem (INVanz) IV  1 g Intravenous Q24H     multivitamins with minerals  15 mL Per Feeding Tube Daily     heparin  5,000 Units Subcutaneous Q8H     aspirin  81 mg Oral Daily     lidocaine (viscous)  5 mL Topical Once     pantoprazole  40 mg Oral or Feeding Tube Daily     fludrocortisone  0.1 mg Oral Daily     sodium chloride (PF)  3 mL Intracatheter Q8H       Physical Exam:     Admit Weight: 94.2 kg (207 lb 11.2 oz) (SCALE 2)    Current vitals:   /76 (BP Location: Left arm)  Pulse 90  Temp 100  F (37.8  C) (Axillary)  Resp 25  Ht 1.829 m (6')  Wt 93.9 kg (207 lb 0.2 oz)  SpO2 98%  BMI 28.08 kg/m2         Vital sign ranges:    Temp:  [99.5  F (37.5  C)-101.4  F (38.6  C)] 100  F (37.8  C)  Pulse:  [90] 90  Heart Rate:  [] 87  Resp:  [22-40] 25  MAP:  [93 mmHg-138 mmHg] 101 mmHg  Arterial Line BP: (145-216)/(64-94) 148/71  FiO2 (%):  [30 %] 30 %  SpO2:  [87 %-100 %] 98 %  Patient Vitals for the past 24 hrs:   Temp Temp src Pulse Heart Rate Resp SpO2 Weight   07/13/17 1000 - - - 87 25 98 % -   07/13/17 0900 - - - 87 24 98 % -   07/13/17 0800 100  F (37.8  C) Axillary - - 22 - -   07/13/17 0700 - - - 89 - 99 % -   07/13/17 0600 - - - 85 25 99 % -   07/13/17 0500 - - - 82 - 99 % -   07/13/17 0400 100.2  F (37.9  C) Axillary - 86 24 99 % 93.9 kg (207 lb 0.2 oz)   07/13/17 0300 - - - 89 - 98 % -   07/13/17 0200 - - - 86 25 99 % -   07/13/17 0100 - - - 92 - (!) 87 % -   07/13/17 0000 101.4  F (38.6  C) Axillary - 93 26 100 % -   07/12/17 2300 - - - 97 - 99 % -   07/12/17 2200 - - - 92 25 99 % -   07/12/17 2100 - - - 90 - 98 % -   07/12/17 2000 100.9  F (38.3  C) Axillary - 87 27 99 % -   07/12/17 1900 - - - 93 - 98 % -   07/12/17 1800 - - - 94 28 99 % -   07/12/17 1700 - - - 95 - 100 % -   07/12/17 1600 99.5  F (37.5  C) Axillary - 99 30 100 % -   07/12/17 1500 - - - 98 28 95 % -    07/12/17 1400 - - - 104 28 100 % -   07/12/17 1330 - - - 102 - 100 % -   07/12/17 1320 - - - 106 (!) 40 100 % -   07/12/17 1300 - - - 94 30 99 % -   07/12/17 1200 100.7  F (38.2  C) Oral 90 92 28 98 % -     General Appearance: NAD  Skin: normal, warm, dry  Heart: NSR  Lungs: Vented  Abdomen: The abdomen is soft, midline incision is c/d/i and covered. Chevron incision well healed.   : vazquez is present, yellow urine in bag.   Extremities:BLE trace edema.  Neurologic: moving extremities, followed simple command.     Data:   CMP    Recent Labs  Lab 07/13/17  1032 07/13/17  0344 07/12/17  1643  07/12/17  0342   * 149*  < > 149*  < > 150*   POTASSIUM  --  3.9  --  4.0  --  4.0   CHLORIDE  --  120*  --  121*  --  120*   CO2  --  20  --  20  --  21   GLC  --  99  --  118*  --  188*   BUN  --  99*  --  111*  --  121*   CR  --  1.91*  --  2.02*  --  2.15*   GFRESTIMATED  --  37*  --  35*  --  32*   GFRESTBLACK  --  45*  --  42*  --  39*   JACOB  --  7.8*  --  8.1*  --  8.0*   MAG  --  2.3  --  2.3  --  2.3   PHOS  --  4.0  --  4.3  --  4.1   ALBUMIN  --  1.8*  --   --   --  1.8*   BILITOTAL  --  0.4  --   --   --  0.4   ALKPHOS  --  146  --   --   --  168*   AST  --  44  --   --   --  29   ALT  --  26  --   --   --  22   < > = values in this interval not displayed.  CBC    Recent Labs  Lab 07/13/17 0344 07/12/17  1643   HGB 7.6* 8.4*   WBC 7.7 9.2   * 104*     COAGS    Recent Labs  Lab 07/10/17  0340 07/08/17  0902   INR 1.17* 1.22*   PTT  --  38*      Urinalysis  Recent Labs   Lab Test  07/11/17   1105  07/06/17   1441  06/14/17   1508   04/11/16   1345   COLOR  Yellow  Yellow   --    < >  Yellow   APPEARANCE  Clear  Cloudy   --    < >  Clear   URINEGLC  Negative  Negative   --    < >  30*   URINEBILI  Negative  Negative   --    < >  Negative   URINEKETONE  Negative  Negative   --    < >  Negative   SG  1.009  1.014   --    < >  1.016   UBLD  Negative  Moderate*   --    < >  Small*   URINEPH  5.0  5.0   --     < >  5.0   PROTEIN  10*  30*   --    < >  30*   NITRITE  Negative  Negative   --    < >  Negative   LEUKEST  Negative  Trace*   --    < >  Negative   RBCU  <1  >182*   --    < >  1   WBCU  <1  9*   --    < >  1   UTPG   --    --   1.55*   --   0.41*    < > = values in this interval not displayed.       Cultures:   Blood Cultures x2 7/4/2017 NGTD     Abdominal Fluid Culture 7/4/2017: NGTD    Abdominal Fluid Culture 7/5/17: NGTD    Bronchoalveolar Culture 7/5/17: VRE.     Sputum endotracheal gm stain/culture 7/11/17: >25 PMNs/low power field   Few Mixed gram positive elvie    CT scan:    7/4/2017 CONCLUSION:   1. Right colonic wall thickening suggesting colitis. Follow-up is necessary to confirm resolution in order to exclude colonic mass.  2. Transverse colon is not well distended reducing evaluation for wall thickening.  3. Small amount of ascites.  4. Splenomegaly.  5. Prominent portal and splenic veins. If confirmation of vascular patency is indicated consider follow-up ultrasound of the liver with Doppler.  6. Small right pleural effusion.  7. Stranding in the subcutaneous fat of the ventral pelvis.     Abdominal XR 7/4/2017:   1. No evidence of pneumoperitoneum.  2. Dilated loop of bowel in the central abdomen, likely sigmoid.    XR Chest Portable 1 View 7/4/17:  1. Endotracheal tube tip projects 5.3 cm from the chacorta.  2. Increased bilateral pleural effusions, right greater than left.  3. Unchanged bibasilar patchy opacities.Attestation:    The patient has been seen and evaluated by me.   Vital signs, labs, medications and orders were reviewed.   When obtained, diagnostic images were reviewed by me and interpreted as above.    The care plan was discussed with the multidisciplinary team and I agree with the findings and plan in this note, with any differences recorded in blue.    Immunosuppressive medication management was reviewed and adjusted as reflected in the note and orders.     .

## 2017-07-13 NOTE — PLAN OF CARE
Problem: Goal Outcome Summary  Goal: Goal Outcome Summary  Outcome: Improving  -Neuro: tmax 100.0, tylenol given x2 today, eyes open to voice, patient following commands intermittently. PERRLA. Tracks. Moves all extremities, restless at times/agitated with cares. BL wrist restraints remain in place d/t agitation restless, reaching for tubes.  -Resp: remain on vent. CMV settings RR 22, , PEEP 5, FiO2 30%. tachypnea improved from yesterday and tolerated PS for about 5 hours this AM. Back to rate when patient went down to CTed to goal of 80  -Cardiac: Hypertensive up to 170s. Orders to treat systolic >180. NSR HR in 80-90s  -GI/: TF increased to 60 cc at 1500. Advance to 70 cc at 2300 if tolerated. Goal of 80 cc/hr. Water flushes decreased to standard 30cc/4hr. Several Large, loose BMstoday. Rectal tube placed and draining. NGT to LIS. Crowe draining clear, yellow urine, good output  -Skin: midline incision open to air with staples, no drainage. Scrotal edema. Some fingers/toes necrotic, cool. MD aware. R Radial clot, ulnar pulse palpable, stable  -Access: L brachial arterial line, R TL CVC. CVPs 5-7  -labs: , creatinine and BUN trending down  -Gtts: insulin gtt titrated throughout shift, D5 decreased to 50 cc/hr (previously correcting sodium)  -tests/diagnostics: CT scan done today around 1400. Pt received 0.5 mg IV dilaudid and 3 of versed at this time.  -pain: prn tylenol given x2 today. Prn oxycodone ordered for additional pain control if needed.      Plan: continue nursing cares, promote activity, advance TF, monitor neurologic status, pain control, extubate, monitor labs    **please draw tacro trough level specifically at 0600 tomorrow AM 7/14/17 as ordered. Thank you!     Yoly Cage  7/13/2017  6:21 PM

## 2017-07-13 NOTE — PROGRESS NOTES
[facility], [place]    Surgical Intensive Care Unit (SICU) Service  Progress Note    Date of Service (when I saw the patient): 07/13/2017     Assessment & Plan   Camacho Bhagat is a 51 year old male who was admitted on 7/4/2017 with abdominal pain and fever.  He was found to have neutropenic colitis.  He underwent liver transplant March 2017 for CASTAÑEDA.      7/4/2017                    Exploratory laparotomy due to concern for perforation, viable hepatic flexure   7/5/2017                    Abdominal washout and closure  7/5/2017                    Left axillary arterial line, bronchoscopy and BAL   7/9/2017                    Right pleural pigtail catheter   7/12/2017                  Pigtail catheter removed       Changes/Updates to plan 7/13:  - Switch Senna and Miralax to PRN; rectal tube ordered   - Increased FW to 60 ml Q2H  - Consult Nephrology about hypernatremia; see nephrology note   - Talk with ID about recurrent fevers    - Remove central line and A-line   - Decrease D5W to 150 ml/hr  - Wait to start Valcyte, continue following CMV PCR weekly per Transplant   - CT chest, abdomen, pelvis per transplant without IV contrast  - Tacro being increased to goal levels by transplant    Assessment/Plan:     PROBLEM LIST:  # neutopenic colitis s/p ex lap  # septic shock, resolved  # toxic metabolic encephalopathy  # right pleural effusion s/p pigtail  # EJ, improving  # hypernatremia  # thrombocytopenia   #neutropenia; resolved   # protein calorie malnutrition  # DM type 2  # CASTAÑEDA s/p liver transplant (3/2017)  # History of DVT with IVC filter    HEMODYNAMICS/CV:  #septic shock; resolved   #History of DVT with IVC filter  #Right radial artery occlusion   -Maintaining MAPs without pressors  -ASA for radial artery occlusion     NEUROLOGIC:  #toxic metabolic encephalopathy   - pain: Tylenol 650 mg PO Q4H PRN  - no sedation   - neurology consulted AMS likely 2/2 toxic encephalopathy  -  Seroquel at HS to try to help normalize sleep schedule and reduce delirium. Will start at low dose and monitor     PULMONARY:  #acute respiratory failure  #Right pleural effusion s/p pigtail placement   - remains intubated, mental status precludes extubation although improving; continue daily PST. Closer to extubation today but too weak to safely extubate    - CMV 22, 440, PEEP 5, 30%  - R pigtail removed 7/12; repeat CXR to pneumo     RENAL:  #EJ  #hypernatremia   - UOP adquate  - EJ improving. BUN remain very elevated.   - nephrology consulted and following; appreciate recs. Continue to replace free water, anticipate hypernatremia will improve as BUN improves.    - will decrease D5 to 150 mL/hr; increase free water via feeding tube to 60ml/ Q2 hours  - follow Na, will improve with improving uremia per nephrology; beginning to trend down slightly     ID:  #neutropenic colitis   #VRE sputum   #Fevers  #Neutropenia; resolved   #CMV PCR elevated from blood sample 7/11  - continue ertapenem for total of 2 weeks on Abx  -CT abdomen today to assess for abscess formation per transplant team   - pan cultured 7/11; No growth to date   - follow weekly CMV PCR while off valcyte. Elevated from sample 7/11; no treatment for now   -neutropenia since June; felt to be 2/2 immunosuppression. Valcyte stopped; counts improving.  No escalation of immunosuppression at this time.     HEME:  #thombocytopenia   #anemia  - Plts stable  - Hgb stable but drifting down   - pancytopenia acute on chronic, likely secondary to medications, MMF and bactrim on hold since 6/27, valcyte held on admission  - Neupogen 480 mcg given 7/4-7/7   - US 7/11 showing distal right radial artery occlusion, vascular surgery consulted, started ASA 81    GI/NUTRITION:  #protein/calorie malnutrition   #colitis   - Off TPN.  Advance TF to goal.   - Senna, Miralax PRN; diarrhea.   -rectal tube for ongoing loose stools     ENDOCRINE:  #hyperglycemia   #DMII  -  continue home fludrocortisone 0.1 mg daily  -continue insulin drip.  Transition to long acting and SSI when nutrition at stable state     MSK:  - PT/OT  - mobilize    SKIN:  - local wound cares    PROPHYLAXIS:  - DVT: heparin 5000 TID  - GI: pantoprazole 40     VASCULAR ACCESS:  - R IJ CVC - remove today  - L axillary art line - remove today  - PIV    CODE STATUS:  - FULL CODE    DISPOSITION:  - SICU    GENERAL CARES  DVT Prophylaxis: Heparin SQ  GI Prophylaxis: PPI  Restraints: Restraints for medical healing needed: YES      Lisa Chambers, JOSELINE     Interval History   No acute events noted overnight.  Remains intubated, tolerated PST x several hours, respiratory mechanics doing well on PST but overall patient too weak to safely extubate.  Continue to work on strengthening.  Mentation somewhat improved today. Following commands, nodding to questions.     Physical Exam   Temp: 100  F (37.8  C) (prn tylenol given) Temp src: Axillary Temp  Min: 99.5  F (37.5  C)  Max: 101.4  F (38.6  C)   Pulse: 90 Heart Rate: 87 Resp: 25 SpO2: 98 % O2 Device: Mechanical Ventilator    Vitals:    07/11/17 0400 07/12/17 0000 07/13/17 0400   Weight: 93.4 kg (205 lb 14.6 oz) 93 kg (205 lb 0.4 oz) 93.9 kg (207 lb 0.2 oz)     I/O last 3 completed shifts:  In: 7023.25 [I.V.:4999.25; NG/GT:525]  Out: 4545 [Urine:3795; Emesis/NG output:750]    GEN: awake, alert, disoriented, no acute distress  EYES: PERRL, Anicteric sclera.    HEENT:  Normocephalic, atraumatic, trachea midline, ETT secure  CV: RRR, no gallops, rubs, or murmurs  PULM/CHEST: Course breath sounds bilaterally, symmetric chest rise. No discharge from CT site.  GI: normal bowel sounds, soft, non-tender, no rebound tenderness or guarding, no masses  : vazquez catheter in place, urine yellow and clear, noted scrotal edema  EXTREMITIES: generalized peripheral edema, moving all extremities, peripheral pulses intact  NEURO: Cranial nerves II-XII grossly intact, following commands.  Nodding to questions   SKIN: No rashes, sores or ulcerations  Imaging personally reviewed:CXR, CT scan    ECG: SR/ST     Medications     D5W 150 mL/hr at 07/13/17 1016     IV fluid REPLACEMENT ONLY       insulin (regular) 7 Units/hr (07/13/17 1015)       QUEtiapine  25 mg Oral At Bedtime     tacrolimus  4.5 mg Oral BID IS     ertapenem (INVanz) IV  1 g Intravenous Q24H     multivitamins with minerals  15 mL Per Feeding Tube Daily     heparin  5,000 Units Subcutaneous Q8H     aspirin  81 mg Oral Daily     lidocaine (viscous)  5 mL Topical Once     pantoprazole  40 mg Oral or Feeding Tube Daily     fludrocortisone  0.1 mg Oral Daily     sodium chloride (PF)  3 mL Intracatheter Q8H       Data     Recent Labs  Lab 07/13/17  1032 07/13/17  0344 07/12/17  2202 07/12/17  1643 07/12/17  1126 07/12/17  0342  07/10/17  0340  07/08/17  0902   WBC  --  7.7  --  9.2  --  9.0  < > 10.5  < >  --    HGB  --  7.6*  --  8.4* 8.1* 8.0*  < > 8.5*  < >  --    MCV  --  89  --  88  --  87  < > 88  < >  --    PLT  --  100*  --  104*  --  84*  < > 71*  < >  --    INR  --   --   --   --   --   --   --  1.17*  --  1.22*   * 149* 148* 149* 149* 150*  < > 149*  < >  --    POTASSIUM  --  3.9  --  4.0  --  4.0  < > 3.3*  < >  --    CHLORIDE  --  120*  --  121*  --  120*  < > 116*  < >  --    CO2  --  20  --  20  --  21  < > 21  < >  --    BUN  --  99*  --  111*  --  121*  < > 125*  < >  --    CR  --  1.91*  --  2.02*  --  2.15*  < > 2.62*  < >  --    ANIONGAP  --  9  --  8  --  8  < > 12  < >  --    JACOB  --  7.8*  --  8.1*  --  8.0*  < > 8.8  < >  --    GLC  --  99  --  118*  --  188*  < > 171*  < >  --    ALBUMIN  --  1.8*  --   --   --  1.8*  < > 1.8*  --   --    PROTTOTAL  --  5.0*  --   --   --  5.0*  < > 5.0*  --   --    BILITOTAL  --  0.4  --   --   --  0.4  < > 0.7  --   --    ALKPHOS  --  146  --   --   --  168*  < > 116  --   --    ALT  --  26  --   --   --  22  < > 34  --   --    AST  --  44  --   --   --  29  < > 67*  --   --     < > = values in this interval not displayed.  Recent Results (from the past 24 hour(s))   XR Chest Port 1 View    Narrative    Exam: Chest x-ray  7/12/2017 8:52 PM      History: CT removal    Comparison: Radiograph same day    Findings: Enteric tube courses below the diaphragm. Stable positioning  of right-sided central catheter and endotracheal tube. Right-sided  chest tube has been removed. Cardiac silhouette is largely obscured.  Trachea is midline. Unchanged bilateral effusions with associated  bilateral pulmonary opacities. No pneumothorax. Visualized abdomen is  normal.      Impression    Impression:   1. No right-sided pneumothorax following removal right-sided chest  tube.  2. Stable bilateral pleural effusions with associated bilateral  atelectasis versus consolidation.    I have personally reviewed the examination and initial interpretation  and I agree with the findings.    BLANCA HUYNH MD

## 2017-07-13 NOTE — PLAN OF CARE
Problem: Goal Outcome Summary  Goal: Goal Outcome Summary  OT/4A: Cancel- Pt not appropriate upon attempts per RN due to sedating medication

## 2017-07-13 NOTE — PLAN OF CARE
Problem: Restraint for Non-Violent/Non-Self-Destructive Behavior  Goal: Prevent/Manage Potential Problems  Maintain safety of patient and others during period of restraint.  Promote psychological and physical wellbeing.  Prevent injury to skin and involved body parts.  Promote nutrition, hydration, and elimination.   Outcome: No Change  Still agitated/restless, needs reorientation frequently, reaching for ETT/lines/drains

## 2017-07-13 NOTE — PLAN OF CARE
Problem: Goal Outcome Summary  Goal: Goal Outcome Summary  Outcome: No Change  D: Tmax 101.4. Sinus rhythm. Normotensive with occasional hypertension. On minimal CMV settings, 30%, rate 22, 500, peep 5.   I/A: Opens eyes spontaneously. Not following commands. Purposeful movements. Agitation with cares. Will get hypertensive with cares, sbp 170-190. Goal to keep sbp <180. PRN 650mg tylenol given x1 for fever. Adequate UOP. Had 2 large BMs this shift. 20K replaced this am. TF at 50cc/hr, will be advanced to 60 at 1200 today, goal of 80. NG to LIS. Continues on insulin gtt and D5 @200cc/hr. PST this am, tolerating well so far. Occasional restlessness throughout the night.   P: Sodium checks every 6 hours. Continue to monitor pt. Consult SICU team with any changes/concerns.

## 2017-07-13 NOTE — PROGRESS NOTES
[facility]  Transplant Infectious Disease Progress Note     Patient:  Camacho Bhagat, Date of birth 1965, Medical record number [id number]  Date of Visit:  07/13/2017         Assessment and Recommendations:   Recommendations:  - continue ertapenem for now  - appreciate CT scan - no drainable fluid collection seen  - follow weekly CMV PCR   - would resume valcyte as his PCR continues to be elevated (his counts have now improved)  - would not treat VRE in sputum - with his low oxygen requirements and unchanged CXR would hold on antibiotics. No clear evidence of aspiration pneumonia.   - agree with decrease in immunosuppression    Transplant Infectious Disease will continue to follow with you.    Assessment: 51 y.o. 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 He is now s/p ex-lap and washout with wound closure. We are asked to comment on antibiotics and ongoing workup.     Colitis - he developed acute onset of pain. He is s/p ex-lap on 7/4 showing inflamed cecum and ascending colon. He had wound closure on 7/5. Cultures from intra-op showed staph capitis  in anaerobic culture  In terms of antibiotic selection, zosyn alone should be sufficient in covering anaerobes and enteric gram negatives. His pancytopenia pre-existed zosyn administration and pancytopenia is a very uncommon occurrence with zosyn. He has been maintained on ertapenem which is adequate coverage but he continues to have fevers. CT scan noted below     Pancytopenia - ongoing since early June. Likely related to his immunosuppression. Heme/onc now following. Agree with decrease in immunosuppressive regimen. His valcyte has been stopped. His counts are improving  Would resume valcyte     CMV+ PCR  - likely due to level of immunosuppression and cessation of valcyte. He is high risk being D+/R- and thus would check CMV DNA PCR weekly while off  valcyte. Would resume valcyte at this point - his counts have improved    VRE in sputum - isolated from BAL. He otherwise has had minimal Oxygen requirements and unchanged imaging. He is colonized with VRE (rectal swab).  If inclined to treat, would favor tigecycline over synercid.      Other ID issues:  Bacterial PPx: on antibiotics for colitis  PJP ppx: off bactrim 2/2 leukopenia  Fungal Ppx: none  SeroStatus: CMV D+/R-, EBV D+/R-  Gamma Globulin Status: last checked in 2011  Immunization Status: not yet done     Discussed with Dr. Francis Dotson D.O.  Infectious Diseases Fellow  [phone]    Physician Attestation   I personally examined and evaluated this patient.  I discussed the patient with Dr. Dotson and agree with the assessment and plan of care as documented in this edited note.  I personally reviewed vital signs, medications, labs and imaging.   YADY AMIN MD  Infectious Diseases Attending  Pager: [phone]      Interval History:   Continues to be intubated and sedated. Febrile again overnight.Low grade temps this AM. Awake and able to follow intermittent commands. Shakes head yes and no. Large volume of stool per RN after stool motility agents given    Transplants:  3/4/2017 (Liver), Postoperative day:  131.  Coordinator Abrli Doty    Review of Systems:  unable to obtain 2/2 clinical condition          Current Medications & Allergies:       QUEtiapine  25 mg Oral At Bedtime     tacrolimus  4.5 mg Oral BID IS     ertapenem (INVanz) IV  1 g Intravenous Q24H     multivitamins with minerals  15 mL Per Feeding Tube Daily     heparin  5,000 Units Subcutaneous Q8H     aspirin  81 mg Oral Daily     lidocaine (viscous)  5 mL Topical Once     pantoprazole  40 mg Oral or Feeding Tube Daily     fludrocortisone  0.1 mg Oral Daily     sodium chloride (PF)  3 mL Intracatheter Q8H       Infusions/Drips:    D5W 150 mL/hr at 07/13/17 1200     IV fluid REPLACEMENT ONLY       insulin (regular) Stopped  (07/13/17 1220)       Allergies   Allergen Reactions     Erythromycin GI Disturbance     Vioxx      Nausea, vomiting            Physical Exam:   Vitals were reviewed.  All vitals stable.  Patient Vitals for the past 24 hrs:   Temp Temp src Resp SpO2 Weight   07/13/17 1400 - - - 98 % -   07/13/17 1300 - - - 99 % -   07/13/17 1200 98.2  F (36.8  C) Axillary 21 100 % -   07/13/17 1158 - - - 100 % -   07/13/17 1100 - - - 100 % -   07/13/17 1000 - - 25 98 % -   07/13/17 0900 - - 24 98 % -   07/13/17 0800 100  F (37.8  C) Axillary 22 99 % -   07/13/17 0700 - - - 99 % -   07/13/17 0600 - - 25 99 % -   07/13/17 0500 - - - 99 % -   07/13/17 0400 100.2  F (37.9  C) Axillary 24 99 % 93.9 kg (207 lb 0.2 oz)   07/13/17 0300 - - - 98 % -   07/13/17 0200 - - 25 99 % -   07/13/17 0100 - - - (!) 87 % -   07/13/17 0000 101.4  F (38.6  C) Axillary 26 100 % -   07/12/17 2300 - - - 99 % -   07/12/17 2200 - - 25 99 % -   07/12/17 2100 - - - 98 % -   07/12/17 2000 100.9  F (38.3  C) Axillary 27 99 % -   07/12/17 1900 - - - 98 % -   07/12/17 1800 - - 28 99 % -   07/12/17 1700 - - - 100 % -   07/12/17 1600 99.5  F (37.5  C) Axillary 30 100 % -     Ranges for vital signs:  Temp:  [98.2  F (36.8  C)-101.4  F (38.6  C)] 98.2  F (36.8  C)  Heart Rate:  [80-99] 85  Resp:  [21-30] 21  MAP:  [98 mmHg-110 mmHg] 109 mmHg  Arterial Line BP: (148-168)/(68-78) 168/70  FiO2 (%):  [30 %] 30 %  SpO2:  [87 %-100 %] 98 %  Vitals:    07/11/17 0400 07/12/17 0000 07/13/17 0400   Weight: 93.4 kg (205 lb 14.6 oz) 93 kg (205 lb 0.4 oz) 93.9 kg (207 lb 0.2 oz)       Physical Examination:  GENERAL:  well-developed, well-nourished,in ICU bed, intubated  HEAD:  Head is normocephalic, atraumatic   ENT:  ETT in place  LUNGS: coarse throughout  CARDIOVASCULAR:  Regular rate and rhythm with no murmurs,   ABDOMEN:  +BS,  surgical incision staples in place - no surrounding erythema  SKIN:  No acute rashes.   NEUROLOGIC:  Awake, following some commands, shaking head yes and  no         Laboratory Data:       Inflammatory Markers    Recent Labs   Lab Test  07/05/17   1810  03/05/17   0358  11/23/16   0813  11/16/16   0706  11/15/16   1039  11/07/16   0759   08/15/11   1151  04/11/11   1209  01/03/11   1246  02/15/10   1232   SED   --    --   45*   --    --    --    --   11  14  17*  25*   CRP  354.0  20.0*  58.0*  59.1*  49.8*  66.0*   < >  11.1*  9.0*  11.7*  17.5*    < > = values in this interval not displayed.       Immune Globulin Studies     Recent Labs   Lab Test  08/15/11   1243   IGG  1160   IGG1  734   IGG2  294   IGG3  7*   IGG4  59       Metabolic Studies       Recent Labs   Lab Test  07/13/17   1032  07/13/17   0344   07/12/17   1643   07/11/17   0328   07/10/17   0340   07/06/17   0316   02/22/17   0643   NA  147*  149*   < >  149*   < >  150*   < >  149*   < >  143   < >  133   POTASSIUM   --   3.9   --   4.0   < >  3.8   < >  3.3*   < >  5.0   < >  4.8   CHLORIDE   --   120*   --   121*   < >  120*   < >  116*   < >  116*   < >  100   CO2   --   20   --   20   < >  20   < >  21   < >  18*   < >  22   ANIONGAP   --   9   --   8   < >  11   < >  12   < >  9   < >  12   BUN   --   99*   --   111*   < >  140*   < >  125*   < >  62*   < >  46*   CR   --   1.91*   --   2.02*   < >  2.57*   < >  2.62*   < >  2.75*   < >  1.40*   GFRESTIMATED   --   37*   --   35*   < >  26*   < >  26*   < >  24*   < >  53*   GLC   --   99   --   118*   < >  137*   < >  171*   < >  139*   < >  147*   A1C   --    --    --    --    --    --    --    --    --   Canceled, Test credited   Below Assay Range  NOTIFIED LEONARD ONEILL AT 0538 ON 7/6/17 BY CHRISSY     --    --    JACOB   --   7.8*   --   8.1*   < >  8.2*   < >  8.8   < >  6.9*   < >  9.0   PHOS   --   4.0   --   4.3   < >  4.4   < >  3.5   < >  5.5*   < >   --    MAG   --   2.3   --   2.3   < >  2.3   < >  2.4*   < >  2.1   < >   --    LACT   --    --    --    --    --   0.9   --   0.9   < >  1.5   < >  1.9   PCAL   --    --    --    --    --     --    --    --    --    --    --   1.76    < > = values in this interval not displayed.       Hepatic Studies    Recent Labs   Lab Test  07/13/17   0344  07/12/17   0342  07/11/17   0328 07/05/17   1810   06/28/12   1234   BILITOTAL  0.4  0.4  0.5   < >   --    < >  1.7*   BILIDELTA   --    --    --    --    --    --   0.5*   BILICONJ   --    --    --    --    --    --   0.0   DBIL  0.2  0.2  0.2   < >   --    < >   --    ALKPHOS  146  168*  158*   < >   --    < >  146   PROTTOTAL  5.0*  5.0*  5.0*   < >   --    < >  6.3*   ALBUMIN  1.8*  1.8*  1.8*   < >   --    < >  3.5*   AST  44  29  34   < >   --    < >  41   ALT  26  22  27   < >   --    < >  41   LDH   --    --    --    --   289*   --    --     < > = values in this interval not displayed.       Pancreatitis testing    Recent Labs   Lab Test  07/12/17   0342   03/05/17   0358  03/03/17   1458  02/21/17   1520  12/09/16   1159   09/30/10   1734   AMYLASE   --    --   53  70   --    --    --   82   LIPASE   --    --   216   --   326  161   --   138   TRIG  114   < >   --    --    --    --    < >   --     < > = values in this interval not displayed.       Gout Labs    No lab results found.    Hematology Studies      Recent Labs   Lab Test  07/13/17   0344 07/12/17   1643   07/12/17   0342 07/11/17   1624  07/11/17   0328  07/10/17   1537   WBC  7.7  9.2   --   9.0  12.3*  11.7*  11.2*   ANEU  6.4  8.2   --   8.1  10.8*  10.4*  9.9*   ALYM  0.8  0.5*   --   0.2*  0.3*  0.2*  0.3*   ZINA  0.3  0.2   --   0.0  0.6  0.1  0.0   AEOS  0.1  0.0   --   0.1  0.3  0.1  0.1   HGB  7.6*  8.4*   < >  8.0*  9.3*  8.5*  8.8*   HCT  24.1*  26.5*   --   25.5*  28.9*  27.0*  27.4*   PLT  100*  104*   --   84*  93*  80*  75*    < > = values in this interval not displayed.       Clotting Studies    Recent Labs   Lab Test  07/10/17   0340  07/08/17   0902  07/06/17   0316  07/06/17   0011   INR  1.17*  1.22*  1.80*  1.91*   PTT   --   38*  47*  48*       Iron Testing    Recent  Labs   Lab Test  07/13/17   0344   07/06/17   1228   07/05/17   1810   02/08/16   1144   IRON   --    --    --    --    --    --   93   FEB   --    --    --    --    --    --   230*   IRONSAT   --    --    --    --    --    --   41   MCV  89   < >  87   < >  86   < >  92   HAPT   --    --    --    --   162   < >   --    RETP   --    --   1.6   < >   --    < >   --    RETICABSCT   --    --   39.4   < >   --    < >   --     < > = values in this interval not displayed.       Markers    Alpha Fetoprotein   Date Value Ref Range Status   02/03/2017  0 - 8 ug/L Final    <1.5  Assay Method:  Chemiluminescence using Siemens Centaur XP         Autoimmune Testing    Recent Labs   Lab Test  01/03/11   1246  02/15/10   1232   JESUS MANUEL  <1.0   --    RHF   --   10   ENASSA   --   0   ENASSB   --   0       Arterial Blood Gas Testing    Recent Labs   Lab Test  07/08/17   0902  07/06/17   2334  07/06/17   0015  07/05/17   2026  07/05/17   1810   PH  7.32*  7.26*  7.27*  7.27*  7.28*   PCO2  35  37  35  35  37   PO2  96  85  109*  155*  188*   HCO3  18*  17*  16*  16*  17*   O2PER  30  30.0  30.0  40  50.0        Thyroid Studies     Recent Labs   Lab Test  02/08/16   1144  04/11/11   1209  02/15/10   1232  07/31/09   1254   TSH  3.35  2.77  2.53  1.95   T4   --   1.23  0.90   --        Urine Studies     Recent Labs   Lab Test  07/11/17   1105  07/06/17   1441  06/06/17   1605  03/24/17   1315  03/16/17   1010   URINEPH  5.0  5.0  5.0  5.5  5.0   NITRITE  Negative  Negative  Negative  Negative  Negative   LEUKEST  Negative  Trace*  Negative  Negative  Negative   WBCU  <1  9*  1  1  5*       CSF testing     Recent Labs   Lab Test  06/11/09   0225   CWBC  1   CRBC  2   CGLU  104*   CTP  54       Medication levels    Recent Labs   Lab Test  07/13/17   0344   07/06/17   0316   VANCOMYCIN   --    --   22.1   TACROL  7.6   < >  6.3    < > = values in this interval not displayed.       Microbiology:  Beta D Glucan levels (Fungitell assay)    No  lab results found.    Last Culture results with specimen source  Culture Micro   Date Value Ref Range Status   07/12/2017 Culture in progress  Final   07/11/2017 (A)  Final    Heavy growth Coagulase negative Staphylococcus Susceptibility testing not   routinely done  Moderate growth Enterococcus faecium Susceptibility testing in progress     07/11/2017 No growth after 2 days  Final   07/11/2017 No growth after 2 days  Final   07/05/2017 No growth  Final   07/05/2017 No growth  Final   07/05/2017 (A)  Final    Heavy growth Enterococcus faecium (VRE)  Susceptibility testing requested by Tip Anna on VRE for   Tigecycline.7/7/17.TV.     07/05/2017 Culture negative after 1 week  Final   07/05/2017   Final    Canceled, Test credited  Incorrectly ordered by PCU/Clinic  See accession [id number] for result.     07/05/2017 (A)  Final    On day 3, isolated in broth only: Staphylococcus capitis  Critical Value/Significant Value, preliminary result only, called to and read   back by CLARA WRIGHT RN 4B  7.8.17 @ 6760   No anaerobes isolated     07/05/2017 No growth  Final    Specimen Description   Date Value Ref Range Status   07/12/2017 Bronchial lavage Left lower lobe  Final   07/12/2017 Bronchial lavage Left lower lobe  Final   07/12/2017 Bronchial lavage Left lower lobe  Final   07/11/2017 Sputum Endotracheal  Final   07/11/2017 Sputum Endotracheal  Final   07/11/2017 Blood Right Arm  Final   07/11/2017 Blood Arterial blood  Final   07/05/2017 Blood Brown port  Final   07/05/2017 Blood Left Brachial Arterial blood  Final   07/05/2017 Bronchoalveolar Lavage Lower Lobes bilateral  Final   07/05/2017 Bronchoalveolar Lavage Lower lobes bilateral  Final   07/05/2017 Bronchoalveolar Lavage Lower lobes bilateral  Final   07/05/2017 Bronchoalveolar Lavage Lower lobes bilateral  Final   07/05/2017 Other  Final        Last check of C difficile  C Diff Toxin B PCR   Date Value Ref Range Status   10/27/2016  NEG Final     Negative  Negative: Clostridium difficile target DNA sequences NOT detected, presumed   negative for Clostridium difficile toxin B or the number of bacteria present   may be below the limit of detection for the test.   FDA approved assay performed using [employer]. GeneXpert real-time PCR.   A negative result does not exclude actual disease due to Clostridium difficile   and may be due to improper collection, handling and storage of the specimen or   the number of organisms in the specimen is below the detection limit of the   assay.         Virology:  Log IU/mL of CMVQNT   Date Value Ref Range Status   07/10/2017 2.2 (H) <2.1 [Log_IU]/mL Final   07/03/2017 <2.1 <2.1 [Log_IU]/mL Final   06/26/2017 Not Calculated <2.1 [Log_IU]/mL Final       Imaging:  Recent Results (from the past 48 hour(s))   XR Chest Port 1 View    Narrative    Examination: XR CHEST PORT 1 VW, 7/11/2017 5:02 PM    Comparison: 7/11/2017 at 1:27 AM    History: chest exam 6 hours post water seal    Findings: Endotracheal tube tip at the level of the mid thoracic  trachea. Enteric tubes extend into the left upper quadrant, tips not  included in the field-of-view. Right IJ central venous catheter tip at  the level of the mid SVC. Right basilar chest tube is stable in  position. The cardiomediastinal silhouette is stable, partially  obscured. Bilateral pleural effusions and associated bibasilar  opacities are not significantly changed. No appreciable pneumothorax.      Impression    Impression:   1. No appreciable pneumothorax.  2. Bilateral pleural effusions and associated perihilar/bibasilar  opacities are not significantly changed.    I have personally reviewed the examination and initial interpretation  and I agree with the findings.    DIAZ NORMAN MD   XR Chest Port 1 View    Narrative    Examination: XR CHEST PORT 1 VW, 7/12/2017 2:06 AM    Comparison: 7/11/2017 at 16:57 PM.    History: intubated    Findings: Endotracheal tube tip in the mid  thoracic trachea. Unchanged  partially visualized enteric tubes. Right IJ central line tip over  distal SVC. Unchanged right basilar chest tube.    Stable cardiac silhouette. Persistent perihilar and bibasilar streaky  opacities. Stable pleural effusions and associated basilar opacities.  No appreciable pneumothorax.      Impression    Impression:   1.  Bilateral pleural effusions and associated perihilar/bibasilar  opacities are not significantly changed.  2.  Stable support devices.    I have personally reviewed the examination and initial interpretation  and I agree with the findings.    DIAZ NORMAN MD   XR Chest Port 1 View    Narrative    Exam: Chest x-ray  7/12/2017 8:52 PM      History: CT removal    Comparison: Radiograph same day    Findings: Enteric tube courses below the diaphragm. Stable positioning  of right-sided central catheter and endotracheal tube. Right-sided  chest tube has been removed. Cardiac silhouette is largely obscured.  Trachea is midline. Unchanged bilateral effusions with associated  bilateral pulmonary opacities. No pneumothorax. Visualized abdomen is  normal.      Impression    Impression:   1. No right-sided pneumothorax following removal right-sided chest  tube.  2. Stable bilateral pleural effusions with associated bilateral  atelectasis versus consolidation.    I have personally reviewed the examination and initial interpretation  and I agree with the findings.    BLANCA HUYNH MD   CT Chest Abdomen Pelvis w/o Contrast    Narrative    EXAMINATION: CT CHEST ABDOMEN PELVIS W/O CONTRAST, 7/13/2017 2:36 PM    TECHNIQUE:  Helical CT images from the thoracic inlet through the  symphysis pubis were obtained  without IV contrast.     COMPARISON: CT chest 7/8/2017, outside CT abdomen 7/4/2017    HISTORY: assess infectious process.    FINDINGS:    Chest: Right IJ central venous catheter tip within the high SVC.  Endotracheal tube tip within the mid thoracic trachea. Gastric  tube  tip within the stomach. Feeding tube tip within the proximal jejunum.  Stable borderline enlargement of the heart. The aorta and pulmonary  artery are normal in caliber. Coronary artery calcifications. The  visualized thyroid is within normal limits. No mediastinal, hilar or  axillary lymphadenopathy. Small to moderate right right-sided pleural  effusion, improved from 7/8/2017. The left-sided pleural effusion is  essentially completely resolved with only trace residual fluid. There  is persistent consolidation/atelectasis of the left lower lobe,  despite near resolution of left-sided pleural effusion. There is near  complete atelectasis of the right lower lobe, with improvement of  right upper and middle lobe atelectasis. The aerated parts of the  lungs are clear.    Abdomen and pelvis: Postoperative changes of liver transplant.  Cholecystectomy. Normal unenhanced appearance of the liver.  Cholecystectomy. The pancreas is atrophic. The spleen is enlarged  measuring up to 20.2 cm in craniocaudal dimension, which appears  slightly decreased from 7/4/2017 when it measured 21.2 cm in  craniocaudal dimension. Normal unenhanced appearance of the adrenals  and kidneys. The bladder is decompressed with Crowe catheter in place.  Prostate is within normal limits. Rectal tube is present. Colonic  diverticulosis. No dilated loops of small bowel. Aorta is normal in  caliber. Infrarenal IVC filter. Moderate-large amount of ascites  increased from 7/8/2017. Mildly prominent retroperitoneal lymph nodes  are not significantly changed.     Bones and soft tissues: Moderate soft tissue edema and dependent skin  thickening. New ventral incision with skin staples present. 4.8 x 1.8  cm ventral abdominal wall fluid collection is not significantly  changed, and is located adjacent to a prominent vein coursing through  the ventral abdominal wall. No acute fracture or suspicious bone  lesion.      Impression    IMPRESSION:   1.  Improved moderate right-sided pleural effusion and resolution of  left-sided pleural effusion. There remain large areas of  atelectasis/consolidation in both lungs, including in the left lower  lobe despite resolution of left-sided pleural effusion. Superimposed  infectious process cannot be excluded.  2. Moderate-large amount of ascites increased from 7/8/2017, which  makes it difficult to evaluate for the presence or absence of  inflammatory change or infectious process in the abdomen or pelvis. No  intra-abdominal abscess.  3. Stable small presumed hematoma within the ventral abdominal wall  fat.  4. Splenomegaly.    I have personally reviewed the examination and initial interpretation  and I agree with the findings.    BASILIO SAGASTUME MD

## 2017-07-14 ENCOUNTER — APPOINTMENT (OUTPATIENT)
Dept: GENERAL RADIOLOGY | Facility: CLINIC | Age: 52
End: 2017-07-14
Attending: NURSE PRACTITIONER
Payer: COMMERCIAL

## 2017-07-14 LAB
ALBUMIN FLD-MCNC: 1.1 G/DL
ALBUMIN SERPL-MCNC: 2 G/DL (ref 3.4–5)
ALP SERPL-CCNC: 171 U/L (ref 40–150)
ALT SERPL W P-5'-P-CCNC: 36 U/L (ref 0–70)
AMYLASE FLD-CCNC: 6 U/L
ANION GAP SERPL CALCULATED.3IONS-SCNC: 8 MMOL/L (ref 3–14)
AST SERPL W P-5'-P-CCNC: 68 U/L (ref 0–45)
BACTERIA SPEC CULT: ABNORMAL
BASOPHILS # BLD AUTO: 0 10E9/L (ref 0–0.2)
BASOPHILS NFR BLD AUTO: 0.2 %
BILIRUB DIRECT SERPL-MCNC: 0.2 MG/DL (ref 0–0.2)
BILIRUB FLD-MCNC: 0.5 MG/DL
BILIRUB SERPL-MCNC: 0.5 MG/DL (ref 0.2–1.3)
BUN SERPL-MCNC: 84 MG/DL (ref 7–30)
CALCIUM SERPL-MCNC: 8 MG/DL (ref 8.5–10.1)
CHLORIDE SERPL-SCNC: 116 MMOL/L (ref 94–109)
CO2 SERPL-SCNC: 20 MMOL/L (ref 20–32)
CREAT SERPL-MCNC: 1.74 MG/DL (ref 0.66–1.25)
DIFFERENTIAL METHOD BLD: ABNORMAL
EOSINOPHIL # BLD AUTO: 0.1 10E9/L (ref 0–0.7)
EOSINOPHIL NFR BLD AUTO: 1.7 %
ERYTHROCYTE [DISTWIDTH] IN BLOOD BY AUTOMATED COUNT: 14.6 % (ref 10–15)
GFR SERPL CREATININE-BSD FRML MDRD: 41 ML/MIN/1.7M2
GLUCOSE BLDC GLUCOMTR-MCNC: 115 MG/DL (ref 70–99)
GLUCOSE BLDC GLUCOMTR-MCNC: 119 MG/DL (ref 70–99)
GLUCOSE BLDC GLUCOMTR-MCNC: 132 MG/DL (ref 70–99)
GLUCOSE BLDC GLUCOMTR-MCNC: 134 MG/DL (ref 70–99)
GLUCOSE BLDC GLUCOMTR-MCNC: 140 MG/DL (ref 70–99)
GLUCOSE BLDC GLUCOMTR-MCNC: 141 MG/DL (ref 70–99)
GLUCOSE BLDC GLUCOMTR-MCNC: 147 MG/DL (ref 70–99)
GLUCOSE BLDC GLUCOMTR-MCNC: 153 MG/DL (ref 70–99)
GLUCOSE BLDC GLUCOMTR-MCNC: 157 MG/DL (ref 70–99)
GLUCOSE BLDC GLUCOMTR-MCNC: 160 MG/DL (ref 70–99)
GLUCOSE BLDC GLUCOMTR-MCNC: 162 MG/DL (ref 70–99)
GLUCOSE BLDC GLUCOMTR-MCNC: 174 MG/DL (ref 70–99)
GLUCOSE BLDC GLUCOMTR-MCNC: 185 MG/DL (ref 70–99)
GLUCOSE BLDC GLUCOMTR-MCNC: 191 MG/DL (ref 70–99)
GLUCOSE BLDC GLUCOMTR-MCNC: 193 MG/DL (ref 70–99)
GLUCOSE BLDC GLUCOMTR-MCNC: 210 MG/DL (ref 70–99)
GLUCOSE BLDC GLUCOMTR-MCNC: 216 MG/DL (ref 70–99)
GLUCOSE FLD-MCNC: 170 MG/DL
GLUCOSE SERPL-MCNC: 187 MG/DL (ref 70–99)
GRAM STN SPEC: NORMAL
HCT VFR BLD AUTO: 26.5 % (ref 40–53)
HGB BLD-MCNC: 8.4 G/DL (ref 13.3–17.7)
IMM GRANULOCYTES # BLD: 0.1 10E9/L (ref 0–0.4)
IMM GRANULOCYTES NFR BLD: 1.2 %
LDH FLD L TO P-CCNC: 210 U/L
LYMPHOCYTES # BLD AUTO: 0.9 10E9/L (ref 0.8–5.3)
LYMPHOCYTES NFR BLD AUTO: 14.2 %
MAGNESIUM SERPL-MCNC: 2.1 MG/DL (ref 1.6–2.3)
MAGNESIUM SERPL-MCNC: 2.2 MG/DL (ref 1.6–2.3)
MCH RBC QN AUTO: 28.1 PG (ref 26.5–33)
MCHC RBC AUTO-ENTMCNC: 31.7 G/DL (ref 31.5–36.5)
MCV RBC AUTO: 89 FL (ref 78–100)
MICRO REPORT STATUS: ABNORMAL
MICRO REPORT STATUS: NORMAL
MICROORGANISM SPEC CULT: ABNORMAL
MONOCYTES # BLD AUTO: 0.1 10E9/L (ref 0–1.3)
MONOCYTES NFR BLD AUTO: 2.2 %
NEUTROPHILS # BLD AUTO: 4.9 10E9/L (ref 1.6–8.3)
NEUTROPHILS NFR BLD AUTO: 80.5 %
NRBC # BLD AUTO: 0 10*3/UL
NRBC BLD AUTO-RTO: 0 /100
PHOSPHATE SERPL-MCNC: 3.7 MG/DL (ref 2.5–4.5)
PHOSPHATE SERPL-MCNC: 4.1 MG/DL (ref 2.5–4.5)
PLATELET # BLD AUTO: 115 10E9/L (ref 150–450)
POTASSIUM SERPL-SCNC: 4.4 MMOL/L (ref 3.4–5.3)
POTASSIUM SERPL-SCNC: 4.7 MMOL/L (ref 3.4–5.3)
PROT FLD-MCNC: 2 G/DL
PROT SERPL-MCNC: 5.6 G/DL (ref 6.8–8.8)
RBC # BLD AUTO: 2.99 10E12/L (ref 4.4–5.9)
SODIUM SERPL-SCNC: 144 MMOL/L (ref 133–144)
SPECIMEN SOURCE FLD: NORMAL
SPECIMEN SOURCE: ABNORMAL
SPECIMEN SOURCE: NORMAL
WBC # BLD AUTO: 6 10E9/L (ref 4–11)

## 2017-07-14 PROCEDURE — 25000131 ZZH RX MED GY IP 250 OP 636 PS 637: Performed by: PHYSICIAN ASSISTANT

## 2017-07-14 PROCEDURE — 25000132 ZZH RX MED GY IP 250 OP 250 PS 637

## 2017-07-14 PROCEDURE — 40000275 ZZH STATISTIC RCP TIME EA 10 MIN

## 2017-07-14 PROCEDURE — 27210437 ZZH NUTRITION PRODUCT SEMIELEM INTERMED LITER

## 2017-07-14 PROCEDURE — 88112 CYTOPATH CELL ENHANCE TECH: CPT | Performed by: STUDENT IN AN ORGANIZED HEALTH CARE EDUCATION/TRAINING PROGRAM

## 2017-07-14 PROCEDURE — 25000128 H RX IP 250 OP 636: Performed by: SURGERY

## 2017-07-14 PROCEDURE — 00000146 ZZHCL STATISTIC GLUCOSE BY METER IP

## 2017-07-14 PROCEDURE — 87075 CULTR BACTERIA EXCEPT BLOOD: CPT | Performed by: STUDENT IN AN ORGANIZED HEALTH CARE EDUCATION/TRAINING PROGRAM

## 2017-07-14 PROCEDURE — 0W9G3ZZ DRAINAGE OF PERITONEAL CAVITY, PERCUTANEOUS APPROACH: ICD-10-PCS | Performed by: SURGERY

## 2017-07-14 PROCEDURE — 00000102 ZZHCL STATISTIC CYTO WRIGHT STAIN TC: Performed by: STUDENT IN AN ORGANIZED HEALTH CARE EDUCATION/TRAINING PROGRAM

## 2017-07-14 PROCEDURE — 87070 CULTURE OTHR SPECIMN AEROBIC: CPT | Performed by: STUDENT IN AN ORGANIZED HEALTH CARE EDUCATION/TRAINING PROGRAM

## 2017-07-14 PROCEDURE — 25000125 ZZHC RX 250: Performed by: PHYSICIAN ASSISTANT

## 2017-07-14 PROCEDURE — 25000128 H RX IP 250 OP 636: Performed by: NURSE PRACTITIONER

## 2017-07-14 PROCEDURE — 89051 BODY FLUID CELL COUNT: CPT | Performed by: STUDENT IN AN ORGANIZED HEALTH CARE EDUCATION/TRAINING PROGRAM

## 2017-07-14 PROCEDURE — 25000132 ZZH RX MED GY IP 250 OP 250 PS 637: Performed by: SURGERY

## 2017-07-14 PROCEDURE — 20000004 ZZH R&B ICU UMMC

## 2017-07-14 PROCEDURE — 25000132 ZZH RX MED GY IP 250 OP 250 PS 637: Performed by: STUDENT IN AN ORGANIZED HEALTH CARE EDUCATION/TRAINING PROGRAM

## 2017-07-14 PROCEDURE — 25000128 H RX IP 250 OP 636

## 2017-07-14 PROCEDURE — 87205 SMEAR GRAM STAIN: CPT | Performed by: STUDENT IN AN ORGANIZED HEALTH CARE EDUCATION/TRAINING PROGRAM

## 2017-07-14 PROCEDURE — 80076 HEPATIC FUNCTION PANEL: CPT | Performed by: TRANSPLANT SURGERY

## 2017-07-14 PROCEDURE — 88305 TISSUE EXAM BY PATHOLOGIST: CPT | Performed by: STUDENT IN AN ORGANIZED HEALTH CARE EDUCATION/TRAINING PROGRAM

## 2017-07-14 PROCEDURE — 88185 FLOWCYTOMETRY/TC ADD-ON: CPT | Performed by: PHYSICIAN ASSISTANT

## 2017-07-14 PROCEDURE — 82247 BILIRUBIN TOTAL: CPT | Performed by: STUDENT IN AN ORGANIZED HEALTH CARE EDUCATION/TRAINING PROGRAM

## 2017-07-14 PROCEDURE — 40001004 ZZHCL STATISTIC FLOW INT 9-15 ABY TC 88188: Performed by: PHYSICIAN ASSISTANT

## 2017-07-14 PROCEDURE — 87102 FUNGUS ISOLATION CULTURE: CPT | Performed by: STUDENT IN AN ORGANIZED HEALTH CARE EDUCATION/TRAINING PROGRAM

## 2017-07-14 PROCEDURE — 83735 ASSAY OF MAGNESIUM: CPT | Performed by: TRANSPLANT SURGERY

## 2017-07-14 PROCEDURE — 84295 ASSAY OF SERUM SODIUM: CPT | Performed by: TRANSPLANT SURGERY

## 2017-07-14 PROCEDURE — 94003 VENT MGMT INPAT SUBQ DAY: CPT

## 2017-07-14 PROCEDURE — 99233 SBSQ HOSP IP/OBS HIGH 50: CPT | Performed by: NURSE PRACTITIONER

## 2017-07-14 PROCEDURE — 83615 LACTATE (LD) (LDH) ENZYME: CPT | Performed by: STUDENT IN AN ORGANIZED HEALTH CARE EDUCATION/TRAINING PROGRAM

## 2017-07-14 PROCEDURE — 82042 OTHER SOURCE ALBUMIN QUAN EA: CPT | Performed by: STUDENT IN AN ORGANIZED HEALTH CARE EDUCATION/TRAINING PROGRAM

## 2017-07-14 PROCEDURE — 25000132 ZZH RX MED GY IP 250 OP 250 PS 637: Performed by: NURSE PRACTITIONER

## 2017-07-14 PROCEDURE — 84100 ASSAY OF PHOSPHORUS: CPT | Performed by: TRANSPLANT SURGERY

## 2017-07-14 PROCEDURE — 25000128 H RX IP 250 OP 636: Performed by: STUDENT IN AN ORGANIZED HEALTH CARE EDUCATION/TRAINING PROGRAM

## 2017-07-14 PROCEDURE — 87206 SMEAR FLUORESCENT/ACID STAI: CPT | Performed by: STUDENT IN AN ORGANIZED HEALTH CARE EDUCATION/TRAINING PROGRAM

## 2017-07-14 PROCEDURE — 85025 COMPLETE CBC W/AUTO DIFF WBC: CPT | Performed by: TRANSPLANT SURGERY

## 2017-07-14 PROCEDURE — 36415 COLL VENOUS BLD VENIPUNCTURE: CPT | Performed by: TRANSPLANT SURGERY

## 2017-07-14 PROCEDURE — 80048 BASIC METABOLIC PNL TOTAL CA: CPT | Performed by: TRANSPLANT SURGERY

## 2017-07-14 PROCEDURE — 82150 ASSAY OF AMYLASE: CPT | Performed by: STUDENT IN AN ORGANIZED HEALTH CARE EDUCATION/TRAINING PROGRAM

## 2017-07-14 PROCEDURE — 84157 ASSAY OF PROTEIN OTHER: CPT | Performed by: STUDENT IN AN ORGANIZED HEALTH CARE EDUCATION/TRAINING PROGRAM

## 2017-07-14 PROCEDURE — 82945 GLUCOSE OTHER FLUID: CPT | Performed by: STUDENT IN AN ORGANIZED HEALTH CARE EDUCATION/TRAINING PROGRAM

## 2017-07-14 PROCEDURE — 71010 XR CHEST PORT 1 VW: CPT

## 2017-07-14 PROCEDURE — 00000155 ZZHCL STATISTIC H-CELL BLOCK W/STAIN: Performed by: STUDENT IN AN ORGANIZED HEALTH CARE EDUCATION/TRAINING PROGRAM

## 2017-07-14 PROCEDURE — 87116 MYCOBACTERIA CULTURE: CPT | Performed by: STUDENT IN AN ORGANIZED HEALTH CARE EDUCATION/TRAINING PROGRAM

## 2017-07-14 PROCEDURE — 25000125 ZZHC RX 250: Performed by: STUDENT IN AN ORGANIZED HEALTH CARE EDUCATION/TRAINING PROGRAM

## 2017-07-14 PROCEDURE — 88184 FLOWCYTOMETRY/ TC 1 MARKER: CPT | Performed by: PHYSICIAN ASSISTANT

## 2017-07-14 RX ORDER — HYDROMORPHONE HYDROCHLORIDE 1 MG/ML
INJECTION, SOLUTION INTRAMUSCULAR; INTRAVENOUS; SUBCUTANEOUS
Status: COMPLETED
Start: 2017-07-14 | End: 2017-07-14

## 2017-07-14 RX ORDER — KETAMINE HYDROCHLORIDE 10 MG/ML
50-100 INJECTION, SOLUTION INTRAMUSCULAR; INTRAVENOUS ONCE
Status: COMPLETED | OUTPATIENT
Start: 2017-07-14 | End: 2017-07-14

## 2017-07-14 RX ORDER — VALGANCICLOVIR 450 MG/1
450 TABLET, FILM COATED ORAL DAILY
Status: DISCONTINUED | OUTPATIENT
Start: 2017-07-14 | End: 2017-07-18

## 2017-07-14 RX ORDER — SODIUM CHLORIDE 9 MG/ML
INJECTION, SOLUTION INTRAVENOUS CONTINUOUS
Status: DISCONTINUED | OUTPATIENT
Start: 2017-07-14 | End: 2017-07-25 | Stop reason: DRUGHIGH

## 2017-07-14 RX ADMIN — TACROLIMUS 5 MG: 5 CAPSULE ORAL at 17:49

## 2017-07-14 RX ADMIN — FLUDROCORTISONE ACETATE 0.1 MG: 0.1 TABLET ORAL at 09:16

## 2017-07-14 RX ADMIN — HUMAN INSULIN 4 UNITS/HR: 100 INJECTION, SOLUTION SUBCUTANEOUS at 21:36

## 2017-07-14 RX ADMIN — QUETIAPINE FUMARATE 25 MG: 25 TABLET, FILM COATED ORAL at 20:01

## 2017-07-14 RX ADMIN — HYDROMORPHONE HYDROCHLORIDE 0.5 MG: 1 INJECTION, SOLUTION INTRAMUSCULAR; INTRAVENOUS; SUBCUTANEOUS at 11:00

## 2017-07-14 RX ADMIN — VALGANCICLOVIR 450 MG: 450 TABLET, FILM COATED ORAL at 17:46

## 2017-07-14 RX ADMIN — ACETAMINOPHEN 650 MG: 325 TABLET, FILM COATED ORAL at 17:46

## 2017-07-14 RX ADMIN — HYDROMORPHONE HYDROCHLORIDE 1 MG: 1 INJECTION, SOLUTION INTRAMUSCULAR; INTRAVENOUS; SUBCUTANEOUS at 15:12

## 2017-07-14 RX ADMIN — AMLODIPINE BESYLATE 5 MG: 10 TABLET ORAL at 12:50

## 2017-07-14 RX ADMIN — HUMAN INSULIN 6 UNITS/HR: 100 INJECTION, SOLUTION SUBCUTANEOUS at 05:07

## 2017-07-14 RX ADMIN — Medication 1 MG: at 15:12

## 2017-07-14 RX ADMIN — MIDAZOLAM 2 MG: 1 INJECTION INTRAMUSCULAR; INTRAVENOUS at 15:12

## 2017-07-14 RX ADMIN — OXYCODONE HYDROCHLORIDE 2.5 MG: 5 TABLET ORAL at 09:16

## 2017-07-14 RX ADMIN — HEPARIN SODIUM 5000 UNITS: 5000 INJECTION, SOLUTION INTRAVENOUS; SUBCUTANEOUS at 12:50

## 2017-07-14 RX ADMIN — ACETAMINOPHEN 650 MG: 325 TABLET, FILM COATED ORAL at 00:18

## 2017-07-14 RX ADMIN — PANTOPRAZOLE SODIUM 40 MG: 40 TABLET, DELAYED RELEASE ORAL at 09:17

## 2017-07-14 RX ADMIN — HUMAN INSULIN 4 UNITS/HR: 100 INJECTION, SOLUTION SUBCUTANEOUS at 14:17

## 2017-07-14 RX ADMIN — TACROLIMUS 5 MG: 5 CAPSULE ORAL at 09:17

## 2017-07-14 RX ADMIN — HEPARIN SODIUM 5000 UNITS: 5000 INJECTION, SOLUTION INTRAVENOUS; SUBCUTANEOUS at 20:01

## 2017-07-14 RX ADMIN — ERTAPENEM SODIUM 1 G: 1 INJECTION, POWDER, LYOPHILIZED, FOR SOLUTION INTRAMUSCULAR; INTRAVENOUS at 09:18

## 2017-07-14 RX ADMIN — MULTIVIT AND MINERALS-FERROUS GLUCONATE 9 MG IRON/15 ML ORAL LIQUID 15 ML: at 09:16

## 2017-07-14 RX ADMIN — HUMAN INSULIN 2 UNITS/HR: 100 INJECTION, SOLUTION SUBCUTANEOUS at 00:14

## 2017-07-14 RX ADMIN — SODIUM CHLORIDE: 9 INJECTION, SOLUTION INTRAVENOUS at 10:15

## 2017-07-14 RX ADMIN — ASPIRIN 81 MG CHEWABLE TABLET 81 MG: 81 TABLET CHEWABLE at 09:16

## 2017-07-14 RX ADMIN — HEPARIN SODIUM 5000 UNITS: 5000 INJECTION, SOLUTION INTRAVENOUS; SUBCUTANEOUS at 03:35

## 2017-07-14 RX ADMIN — Medication 100 MG: at 14:36

## 2017-07-14 NOTE — PLAN OF CARE
Problem: Sepsis (Adult)  Goal: Signs and Symptoms of Listed Potential Problems Will be Absent or Manageable (Sepsis)  Signs and symptoms of listed potential problems will be absent or manageable by discharge/transition of care (reference Sepsis (Adult) CPG).   D: 63 y.o M neutropenic colitis s/p exploratory lap and washout.   I/A: t max 100.8. HR 80-90, no ectopy noted. L brachial ART line pulled, MAP different 20-30 points, per MD pull anyways. SBP goal <180, per MD ok to be higher with cuff pressure r/t difference between cuff and ART. Neuro: appears appropriate, periodically refuses to follow commands. PERRLA. Vent setting continued. RR 22-28. Pt denied anxiety and pain, but periodicly becomes tachypnic and tachycardic with stimulation. TF increased to 70 ml/hr, increase to goal of 80 ml/hr @ 0800. R CVC pulled and tip cultured per MD. Magdy PIERCEL.   P: PST this AM. Continue to monitor and encourage.

## 2017-07-14 NOTE — PROGRESS NOTES
[facility]  Transplant Infectious Disease Progress Note     Patient:  Camacho Bhagat, Date of birth 1965, Medical record number [id number]  Date of Visit:  07/14/2017         Assessment and Recommendations:   Recommendations:  - continue ertapenem for now  - appreciate CT scan - no drainable fluid collection seen but significant ascites  - would recommend diagnostic paracentesis and fluid to be sent for culture/gram stain  - recommend starting Tigecycline after the paracentesis (loading dose is 100mg x 1 then start 50mg QD)  to cover potential VRE - as he is colonized with this and to this point we have not covered it  - will follow paracentesis labs - if no signs of infection x 24 hours, will discontinue both antibiotics as this could also be drug fever  - follow weekly CMV PCR   - would resume valcyte as his PCR continues to be elevated (his counts have now improved)  - agree with decrease in immunosuppression    Transplant Infectious Disease will continue to follow with you. Dr. Jenna Velarde is covering the service over the weekend - pager [phone]. Dr. Naseem Figueroa will be following the patient starting on Monday.    Assessment: 51 y.o. 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 He is now s/p ex-lap and washout with wound closure. We are asked to comment on antibiotics and ongoing workup.     Colitis - he developed acute onset of pain. He is s/p ex-lap on 7/4 showing inflamed cecum and ascending colon. He had wound closure on 7/5. Cultures from intra-op showed staph capitis  in anaerobic culture  He has been maintained on ertapenem which is adequate coverage but he continues to have fevers. CT scan noted below    Fever - unclear if this is due to ongoing infection. Organisms not covered by ertapenem would be VRE (which he is colonized with) or pseudomonas. Favor doing a diagnostic tap of the ascites  to rule out infection and then starting tigecycline at the dose above. If the paracentesis fluid does not look suspicious for infection, and he continues to have fevers, then we would stop antibiotics at that point as this could also be drug fever from the beta-lactam      Pancytopenia - ongoing since early June. Likely related to his immunosuppression. Heme/onc now following. Agree with decrease in immunosuppressive regimen. His valcyte has been stopped. His counts are improving  Would resume valcyte     CMV+ PCR  - likely due to level of immunosuppression and cessation of valcyte. He is high risk being D+/R- and thus would check CMV DNA PCR weekly while off valcyte. Would resume valcyte at this point - his counts have improved    VRE in sputum - isolated from BAL. He otherwise has had minimal Oxygen requirements and unchanged imaging. He is colonized with VRE (rectal swab). Would start tigecycline as above   Other ID issues:  Bacterial PPx: on antibiotics for colitis  PJP ppx: off bactrim 2/2 leukopenia  Fungal Ppx: none  SeroStatus: CMV D+/R-, EBV D+/R-  Gamma Globulin Status: last checked in 2011  Immunization Status: not yet done     Discussed with Dr. Francis Dotson D.O.  Infectious Diseases Fellow  [phone]    Physician Attestation  I personally examined and evaluated this patient.  I discussed the patient with Dr. Dotson and agree with the assessment and plan of care as documented in this edited. I personally reviewed vital signs, medications, labs and imaging. Patient continues to be febrile. CT c/a/p with no clear source of infection but large ascites. Will favor a diagnostic paracentesis to r/o peritonitis. After paracentesis, will add tigecycline to cover for VRE (since he is colonized). If abdominal fluid is negative for infection, will stop all antibiotics and monitor since this could also be a drug fever.     YADY AMIN MD  Infectious Diseases Attending  Pager: [phone]       Interval  History:   Continues to be intubated. Awake and able to follow commands this AM. Continues to be febrile. Denies abdominal Pain.     Transplants:  3/4/2017 (Liver), Postoperative day:  132.  Coordinator Abril Doty    Review of Systems:  unable to obtain 2/2 clinical condition          Current Medications & Allergies:       amLODIPine  5 mg Oral Daily     QUEtiapine  25 mg Oral At Bedtime     tacrolimus  5 mg Oral BID IS     ertapenem (INVanz) IV  1 g Intravenous Q24H     multivitamins with minerals  15 mL Per Feeding Tube Daily     heparin  5,000 Units Subcutaneous Q8H     aspirin  81 mg Oral Daily     lidocaine (viscous)  5 mL Topical Once     pantoprazole  40 mg Oral or Feeding Tube Daily     fludrocortisone  0.1 mg Oral Daily     sodium chloride (PF)  3 mL Intracatheter Q8H       Infusions/Drips:    NaCl       IV fluid REPLACEMENT ONLY       insulin (regular) 2 Units/hr (07/14/17 0800)       Allergies   Allergen Reactions     Erythromycin GI Disturbance     Vioxx      Nausea, vomiting            Physical Exam:   Vitals were reviewed.  All vitals stable.  Patient Vitals for the past 24 hrs:   BP Temp Temp src Resp SpO2 Weight   07/14/17 1200 - 100.1  F (37.8  C) Axillary - - -   07/14/17 1100 190/79 - - 25 100 % -   07/14/17 1000 (!) 221/80 - - 30 100 % -   07/14/17 0900 194/81 - - 22 100 % -   07/14/17 0800 (!) 202/73 101.4  F (38.6  C) Axillary 25 99 % -   07/14/17 0727 186/72 - - - - -   07/14/17 0700 - - - - 99 % -   07/14/17 0600 180/64 - - 26 99 % -   07/14/17 0500 187/70 - - - 99 % -   07/14/17 0400 162/74 100.8  F (38.2  C) Axillary 25 99 % 92.3 kg (203 lb 7.8 oz)   07/14/17 0300 174/83 - - - 99 % -   07/14/17 0200 146/81 - - 26 99 % -   07/14/17 0100 162/71 - - - 99 % -   07/14/17 0000 183/67 101.2  F (38.4  C) Axillary 24 99 % -   07/13/17 2300 168/76 - - - 100 % -   07/13/17 2200 183/76 - - 26 100 % -   07/13/17 2100 173/81 - - - 99 % -   07/13/17 2000 176/73 98.7  F (37.1  C) Axillary 23 99 % -    07/13/17 1900 - - - - 99 % -   07/13/17 1800 - - - - 98 % -   07/13/17 1700 - - - - 99 % -   07/13/17 1600 144/66 98.1  F (36.7  C) Axillary 20 100 % -   07/13/17 1500 - - - - 100 % -   07/13/17 1400 - - - 21 98 % -   07/13/17 1300 - - - 18 99 % -     Ranges for vital signs:  Temp:  [98.1  F (36.7  C)-101.4  F (38.6  C)] 100.1  F (37.8  C)  Heart Rate:  [] 100  Resp:  [18-30] 25  BP: (144-221)/(64-83) 190/79  MAP:  [91 mmHg-109 mmHg] 91 mmHg  Arterial Line BP: (139-168)/(63-78) 149/63  FiO2 (%):  [30 %] 30 %  SpO2:  [98 %-100 %] 100 %  Vitals:    07/12/17 0000 07/13/17 0400 07/14/17 0400   Weight: 93 kg (205 lb 0.4 oz) 93.9 kg (207 lb 0.2 oz) 92.3 kg (203 lb 7.8 oz)       Physical Examination:  GENERAL:  well-developed, well-nourished,in ICU bed, intubated  HEAD:  Head is normocephalic, atraumatic   ENT:  ETT in place  LUNGS: coarse throughout  CARDIOVASCULAR:  Regular rate and rhythm with no murmurs,   ABDOMEN:  +BS,  surgical incision staples in place - no surrounding erythema  SKIN:  No acute rashes.   NEUROLOGIC:  Awake, following some commands, shaking head yes and no         Laboratory Data:       Inflammatory Markers    Recent Labs   Lab Test  07/05/17   1810  03/05/17   0358  11/23/16   0813  11/16/16   0706  11/15/16   1039  11/07/16   0759   08/15/11   1151  04/11/11   1209  01/03/11   1246  02/15/10   1232   SED   --    --   45*   --    --    --    --   11  14  17*  25*   CRP  354.0  20.0*  58.0*  59.1*  49.8*  66.0*   < >  11.1*  9.0*  11.7*  17.5*    < > = values in this interval not displayed.       Immune Globulin Studies     Recent Labs   Lab Test  08/15/11   1243   IGG  1160   IGG1  734   IGG2  294   IGG3  7*   IGG4  59       Metabolic Studies       Recent Labs   Lab Test  07/14/17   0349  07/13/17   2343  07/13/17   1536   07/11/17   0328   07/10/17   0340   07/06/17   0316   02/22/17   0643   NA  144   --   143   < >  150*   < >  149*   < >  143   < >  133   POTASSIUM  4.4  4.7  4.3   < >   3.8   < >  3.3*   < >  5.0   < >  4.8   CHLORIDE  116*   --   115*   < >  120*   < >  116*   < >  116*   < >  100   CO2  20   --   21   < >  20   < >  21   < >  18*   < >  22   ANIONGAP  8   --   8   < >  11   < >  12   < >  9   < >  12   BUN  84*   --   88*   < >  140*   < >  125*   < >  62*   < >  46*   CR  1.74*   --   1.75*   < >  2.57*   < >  2.62*   < >  2.75*   < >  1.40*   GFRESTIMATED  41*   --   41*   < >  26*   < >  26*   < >  24*   < >  53*   GLC  187*   --   162*   < >  137*   < >  171*   < >  139*   < >  147*   A1C   --    --    --    --    --    --    --    --   Canceled, Test credited   Below Assay Range  NOTIFIED LEONARD ONEILL AT 0538 ON 7/6/17 BY CHRISSY     --    --    JACOB  8.0*   --   7.7*   < >  8.2*   < >  8.8   < >  6.9*   < >  9.0   PHOS  3.7  4.1  4.2   < >  4.4   < >  3.5   < >  5.5*   < >   --    MAG  2.2  2.1  2.1   < >  2.3   < >  2.4*   < >  2.1   < >   --    LACT   --    --    --    --   0.9   --   0.9   < >  1.5   < >  1.9   PCAL   --    --    --    --    --    --    --    --    --    --   1.76    < > = values in this interval not displayed.       Hepatic Studies    Recent Labs   Lab Test  07/14/17   0349  07/13/17   0344  07/12/17   0342   07/05/17   1810   06/28/12   1234   BILITOTAL  0.5  0.4  0.4   < >   --    < >  1.7*   BILIDELTA   --    --    --    --    --    --   0.5*   BILICONJ   --    --    --    --    --    --   0.0   DBIL  0.2  0.2  0.2   < >   --    < >   --    ALKPHOS  171*  146  168*   < >   --    < >  146   PROTTOTAL  5.6*  5.0*  5.0*   < >   --    < >  6.3*   ALBUMIN  2.0*  1.8*  1.8*   < >   --    < >  3.5*   AST  68*  44  29   < >   --    < >  41   ALT  36  26  22   < >   --    < >  41   LDH   --    --    --    --   289*   --    --     < > = values in this interval not displayed.       Pancreatitis testing    Recent Labs   Lab Test  07/12/17   0342   03/05/17   0358  03/03/17   1458  02/21/17   1520  12/09/16   1159   09/30/10   1734   AMYLASE   --    --   53  70    --    --    --   82   LIPASE   --    --   216   --   326  161   --   138   TRIG  114   < >   --    --    --    --    < >   --     < > = values in this interval not displayed.       Gout Labs    No lab results found.    Hematology Studies      Recent Labs   Lab Test  07/14/17   0349 07/13/17   1536  07/13/17   0344  07/12/17   1643   07/12/17   0342  07/11/17   1624   WBC  6.0  7.1  7.7  9.2   --   9.0  12.3*   ANEU  4.9  6.0  6.4  8.2   --   8.1  10.8*   ALYM  0.9  0.5*  0.8  0.5*   --   0.2*  0.3*   ZINA  0.1  0.4  0.3  0.2   --   0.0  0.6   AEOS  0.1  0.1  0.1  0.0   --   0.1  0.3   HGB  8.4*  7.8*  7.6*  8.4*   < >  8.0*  9.3*   HCT  26.5*  24.3*  24.1*  26.5*   --   25.5*  28.9*   PLT  115*  96*  100*  104*   --   84*  93*    < > = values in this interval not displayed.       Clotting Studies    Recent Labs   Lab Test  07/10/17   0340 07/08/17   0902  07/06/17   0316  07/06/17   0011   INR  1.17*  1.22*  1.80*  1.91*   PTT   --   38*  47*  48*       Iron Testing    Recent Labs   Lab Test  07/14/17   0349 07/06/17   1228   07/05/17   1810   02/08/16   1144   IRON   --    --    --    --    --    --   93   FEB   --    --    --    --    --    --   230*   IRONSAT   --    --    --    --    --    --   41   MCV  89   < >  87   < >  86   < >  92   HAPT   --    --    --    --   162   < >   --    RETP   --    --   1.6   < >   --    < >   --    RETICABSCT   --    --   39.4   < >   --    < >   --     < > = values in this interval not displayed.       Markers    Alpha Fetoprotein   Date Value Ref Range Status   02/03/2017  0 - 8 ug/L Final    <1.5  Assay Method:  Chemiluminescence using Siemens Centaur XP         Autoimmune Testing    Recent Labs   Lab Test  01/03/11   1246  02/15/10   1232   JESUS MANUEL  <1.0   --    RHF   --   10   ENASSA   --   0   ENASSB   --   0       Arterial Blood Gas Testing    Recent Labs   Lab Test  07/08/17   0902  07/06/17   2334  07/06/17   0015  07/05/17   2026  07/05/17   1810   PH  7.32*  7.26*   7.27*  7.27*  7.28*   PCO2  35  37  35  35  37   PO2  96  85  109*  155*  188*   HCO3  18*  17*  16*  16*  17*   O2PER  30  30.0  30.0  40  50.0        Thyroid Studies     Recent Labs   Lab Test  02/08/16   1144  04/11/11   1209  02/15/10   1232  07/31/09   1254   TSH  3.35  2.77  2.53  1.95   T4   --   1.23  0.90   --        Urine Studies     Recent Labs   Lab Test  07/11/17   1105  07/06/17   1441  06/06/17   1605  03/24/17   1315  03/16/17   1010   URINEPH  5.0  5.0  5.0  5.5  5.0   NITRITE  Negative  Negative  Negative  Negative  Negative   LEUKEST  Negative  Trace*  Negative  Negative  Negative   WBCU  <1  9*  1  1  5*       CSF testing     Recent Labs   Lab Test  06/11/09   0225   CWBC  1   CRBC  2   CGLU  104*   CTP  54       Medication levels    Recent Labs   Lab Test  07/13/17   0344   07/06/17   0316   VANCOMYCIN   --    --   22.1   TACROL  7.6   < >  6.3    < > = values in this interval not displayed.       Microbiology:  Beta D Glucan levels (Fungitell assay)    No lab results found.    Last Culture results with specimen source  Culture Micro   Date Value Ref Range Status   07/12/2017 Heavy growth Normal elvie  Culture in progress    Final   07/12/2017 Yeast isolated (A)  Final   07/11/2017 (A)  Final    Heavy growth Coagulase negative Staphylococcus Susceptibility testing not   routinely done  Moderate growth Enterococcus faecium (VRE)     07/11/2017 No growth after 3 days  Final   07/11/2017 No growth after 3 days  Final   07/05/2017 No growth  Final   07/05/2017 No growth  Final   07/05/2017 (A)  Final    Heavy growth Enterococcus faecium (VRE)  Susceptibility testing requested by Tip Anna on VRE for   Tigecycline.7/7/17.TV.     07/05/2017 Culture negative after 1 week  Final   07/05/2017   Final    Canceled, Test credited  Incorrectly ordered by PCU/Clinic  See accession [id number] for result.      Specimen Description   Date Value Ref Range Status   07/12/2017 Bronchial lavage Left lower lobe   Final   07/12/2017 Bronchial lavage Left lower lobe  Final   07/12/2017 Bronchial lavage Left lower lobe  Final   07/12/2017 Bronchial lavage Left lower lobe  Final   07/11/2017 Sputum Endotracheal  Final   07/11/2017 Sputum Endotracheal  Final   07/11/2017 Blood Right Arm  Final   07/11/2017 Blood Arterial blood  Final   07/05/2017 Blood Brown port  Final   07/05/2017 Blood Left Brachial Arterial blood  Final        Last check of C difficile  C Diff Toxin B PCR   Date Value Ref Range Status   10/27/2016  NEG Final    Negative  Negative: Clostridium difficile target DNA sequences NOT detected, presumed   negative for Clostridium difficile toxin B or the number of bacteria present   may be below the limit of detection for the test.   FDA approved assay performed using [employer] real-time PCR.   A negative result does not exclude actual disease due to Clostridium difficile   and may be due to improper collection, handling and storage of the specimen or   the number of organisms in the specimen is below the detection limit of the   assay.         Virology:  Log IU/mL of CMVQNT   Date Value Ref Range Status   07/10/2017 2.2 (H) <2.1 [Log_IU]/mL Final   07/03/2017 <2.1 <2.1 [Log_IU]/mL Final   06/26/2017 Not Calculated <2.1 [Log_IU]/mL Final       Imaging:  Recent Results (from the past 48 hour(s))   XR Chest Port 1 View    Narrative    Exam: Chest x-ray  7/12/2017 8:52 PM      History: CT removal    Comparison: Radiograph same day    Findings: Enteric tube courses below the diaphragm. Stable positioning  of right-sided central catheter and endotracheal tube. Right-sided  chest tube has been removed. Cardiac silhouette is largely obscured.  Trachea is midline. Unchanged bilateral effusions with associated  bilateral pulmonary opacities. No pneumothorax. Visualized abdomen is  normal.      Impression    Impression:   1. No right-sided pneumothorax following removal right-sided chest  tube.  2. Stable bilateral  pleural effusions with associated bilateral  atelectasis versus consolidation.    I have personally reviewed the examination and initial interpretation  and I agree with the findings.    BLANCA HUYNH MD   CT Chest Abdomen Pelvis w/o Contrast    Narrative    EXAMINATION: CT CHEST ABDOMEN PELVIS W/O CONTRAST, 7/13/2017 2:36 PM    TECHNIQUE:  Helical CT images from the thoracic inlet through the  symphysis pubis were obtained  without IV contrast.     COMPARISON: CT chest 7/8/2017, outside CT abdomen 7/4/2017    HISTORY: assess infectious process.    FINDINGS:    Chest: Right IJ central venous catheter tip within the high SVC.  Endotracheal tube tip within the mid thoracic trachea. Gastric tube  tip within the stomach. Feeding tube tip within the proximal jejunum.  Stable borderline enlargement of the heart. The aorta and pulmonary  artery are normal in caliber. Coronary artery calcifications. The  visualized thyroid is within normal limits. No mediastinal, hilar or  axillary lymphadenopathy. Small to moderate right right-sided pleural  effusion, improved from 7/8/2017. The left-sided pleural effusion is  essentially completely resolved with only trace residual fluid. There  is persistent consolidation/atelectasis of the left lower lobe,  despite near resolution of left-sided pleural effusion. There is near  complete atelectasis of the right lower lobe, with improvement of  right upper and middle lobe atelectasis. The aerated parts of the  lungs are clear.    Abdomen and pelvis: Postoperative changes of liver transplant.  Cholecystectomy. Normal unenhanced appearance of the liver.  Cholecystectomy. The pancreas is atrophic. The spleen is enlarged  measuring up to 20.2 cm in craniocaudal dimension, which appears  slightly decreased from 7/4/2017 when it measured 21.2 cm in  craniocaudal dimension. Normal unenhanced appearance of the adrenals  and kidneys. The bladder is decompressed with Crowe catheter in  place.  Prostate is within normal limits. Rectal tube is present. Colonic  diverticulosis. No dilated loops of small bowel. Aorta is normal in  caliber. Infrarenal IVC filter. Moderate-large amount of ascites  increased from 7/8/2017. Mildly prominent retroperitoneal lymph nodes  are not significantly changed.     Bones and soft tissues: Moderate soft tissue edema and dependent skin  thickening. New ventral incision with skin staples present. 4.8 x 1.8  cm ventral abdominal wall fluid collection is not significantly  changed, and is located adjacent to a prominent vein coursing through  the ventral abdominal wall. No acute fracture or suspicious bone  lesion.      Impression    IMPRESSION:   1. Improved moderate right-sided pleural effusion and resolution of  left-sided pleural effusion. There remain large areas of  atelectasis/consolidation in both lungs, including in the left lower  lobe despite resolution of left-sided pleural effusion. Superimposed  infectious process cannot be excluded.  2. Moderate-large amount of ascites increased from 7/8/2017, which  makes it difficult to evaluate for the presence or absence of  inflammatory change or infectious process in the abdomen or pelvis. No  intra-abdominal abscess.  3. Stable small presumed hematoma within the ventral abdominal wall  fat.  4. Splenomegaly.    I have personally reviewed the examination and initial interpretation  and I agree with the findings.    BASILIO SAGASTUME MD

## 2017-07-14 NOTE — PROGRESS NOTES
[facility], [place]    Surgical Intensive Care Unit (SICU) Service  Progress Note    Date of Service (when I saw the patient): 07/14/2017     Changes/Updates to plan 7/14:  - Stop D5W  - 60 ml Q1H of free water   - Start home Amolodipine 5 mg   - Hypernatremia resolving  Assessment & Plan   Camacho Bhagat is a 51 year old male who was admitted on 7/4/2017 with abdominal pain and fever.  He was found to have neutropenic colitis.  He underwent liver transplant March 2017 for CASTAÑEDA.    Procedures:  7/4/2017   Exploratory laparotomy due to concern for perforation, viable hepatic flexure   7/5/2017    Abdominal washout and closure  7/5/2017    Left axillary arterial line, bronchoscopy and BAL   7/9/2017    Right pleural pigtail catheter, removed 7/12 7//11/2017 Bronchoscopy      NEUROLOGIC:  # Toxic metabolic encephalopathy   - pain: Tylenol 650 mg PO Q4H PRN, oxycodone 2.5 mg q6  - no sedation   - neurology consulted 7/11,  AMS likely 2/2 toxic encephalopathy  - Seroquel at HS to try to help normalize sleep schedule and reduce delirium.    CV:  #Septic shock; resolved   #History of DVT with IVC filter  #Distal right radial artery occlusion   -ASA for radial artery occlusion per vascular surgery  #Hypertension  - Will resume PTA Amlodipine    PULMONARY:  #Acute respiratory failure, hypoxia  #Right pleural effusion s/p pigtail placement   - CMV 22, 440, PEEP 5, 30%  - Remains intubated, mental status improving but remains weak; continue daily PST.  - R pigtail removed 7/12; repeat CXR     RENAL:  #EJ  #Hypernatremia   - UOP adquate  - EJ improving, creatinine improving.   - Nephrology following; appreciate recs.   - TKO fluids and increase free water via feeding tube to 60ml Q1 hours  - Uremia improving, Hypernatremia resolved    ID:  #Neutropenic colitis   #VRE HAP pneumonia  #Fevers  - Continue ertapenem for total of 2 weeks epiric treatment of colitis  - CT abdomen without abscess  formation  - Pan cultured 7/11; No growth to date  #CMV PCR elevated from blood sample 7/11  - Resume Valcyte per ID   - follow weekly CMV PCR while off valcyte.    HEME:  #Pancytopenia  #Acute blood loss with chronic anemia  # History of DVT with IVC filter  - Plts stable  - Hgb stable   - Neupogen 480 mcg given 7/4-7/7   - US 7/11 showing distal right radial artery occlusion, vascular surgery consulted, recommended ASA 81daily    GI/NUTRITION:  #Protein/caloriedeficit malnutrition   #Colitis, s/p ex laparotomy (7/5)  # CASTAÑEDA s/p liver transplant (3/2017)  - Off TPN.  Advance TF to goal at 45 goal =80  - Senna, Miralax PRN; diarrhea.   - Rectal tube for ongoing loose stools     ENDOCRINE:  #DMII  - Continue home fludrocortisone 0.1 mg daily  - Continue insulin drip.  Transition to long acting and SSI when nutrition at stable state     MSK:  #Immobilty   - PT/OT   - Mobilize    VASCULAR ACCESS:  - NJ  - PIV    CODE STATUS:  - FULL CODE    DISPOSITION:  - SICU    GENERAL CARES  DVT Prophylaxis: Heparin SQ  GI Prophylaxis: PPI  Restraints: Restraints for medical healing needed: YES    Walter Topete, MS4  Makenna Cordova NP.  The plan above is mine with the assistance of Walter Topete who has functioned as a medical scribe.     Interval History   No acute events noted overnight.  Remains intubated, tolerated PST x 7 hours, respiratory mechanics doing well on PST.  Continue to work on strengthening.  Mentation somewhat improved today. Following commands, nodding to questions. Pointing to area of pain by his leg.      Physical Exam   Temp: 100.1  F (37.8  C) Temp src: Axillary Temp  Min: 98.1  F (36.7  C)  Max: 101.4  F (38.6  C) BP: 190/79   Heart Rate: 100 Resp: 25 SpO2: 100 % O2 Device: Mechanical Ventilator    Vitals:    07/12/17 0000 07/13/17 0400 07/14/17 0400   Weight: 93 kg (205 lb 0.4 oz) 93.9 kg (207 lb 0.2 oz) 92.3 kg (203 lb 7.8 oz)     I/O last 3 completed shifts:  In: 5446.36 [I.V.:2921.36; NG/GT:1355]  Out:  4310 [Urine:3560; Emesis/NG output:250; Stool:500]    GEN: awake, alert, disoriented, no acute distress  EYES: PERRL, Anicteric sclera. Nystagmus noted.   HEENT:  Normocephalic, atraumatic, trachea midline, ETT secure  CV: RRR, no gallops, rubs, or murmurs  PULM/CHEST: Clear breath sounds bilaterally, symmetric chest rise.   GI: Mildly distended with fluid wave noted.   : Vazquez catheter in place, urine yellow and clear, noted scrotal edema  EXTREMITIES: generalized peripheral edema, moving all extremities, peripheral pulses intact.   NEURO: Following commands. SANTIAGO  SKIN: warm and dry  Imaging personally reviewed:no   The above is my exam Makenna Cordova NP    GEN: appears feverish and shaking. awake, alert, disoriented, no acute distress  EYES: PERRL, Anicteric sclera. Nystagmus noted.   HEENT:  Normocephalic, atraumatic, trachea midline, ETT secure  CV: RRR, no gallops, rubs, or murmurs  PULM/CHEST: Clear breath sounds bilaterally, symmetric chest rise. No discharge from Chest tube site.  GI: Mildly distended with fluid wave noted.   : vazquez catheter in place, urine yellow and clear, noted scrotal edema  EXTREMITIES: generalized peripheral edema, moving all extremities, peripheral pulses intact. Tenderness with passive movement of ankles and knees.  NEURO: Following commands. Nodding to questions   SKIN: No rashes, sores or ulcerations  Imaging personally reviewed:CXR, CT scan      Medications     NaCl       IV fluid REPLACEMENT ONLY       insulin (regular) 2 Units/hr (07/14/17 0800)       amLODIPine  5 mg Oral Daily     QUEtiapine  25 mg Oral At Bedtime     tacrolimus  5 mg Oral BID IS     ertapenem (INVanz) IV  1 g Intravenous Q24H     multivitamins with minerals  15 mL Per Feeding Tube Daily     heparin  5,000 Units Subcutaneous Q8H     aspirin  81 mg Oral Daily     lidocaine (viscous)  5 mL Topical Once     pantoprazole  40 mg Oral or Feeding Tube Daily     fludrocortisone  0.1 mg Oral Daily     sodium  chloride (PF)  3 mL Intracatheter Q8H       Data     Recent Labs  Lab 07/14/17  0349 07/13/17  2343 07/13/17  1536 07/13/17  1032 07/13/17  0344  07/10/17  0340  07/08/17  0902   WBC 6.0  --  7.1  --  7.7  < > 10.5  < >  --    HGB 8.4*  --  7.8*  --  7.6*  < > 8.5*  < >  --    MCV 89  --  87  --  89  < > 88  < >  --    *  --  96*  --  100*  < > 71*  < >  --    INR  --   --   --   --   --   --  1.17*  --  1.22*     --  143 147* 149*  < > 149*  < >  --    POTASSIUM 4.4 4.7 4.3  --  3.9  < > 3.3*  < >  --    CHLORIDE 116*  --  115*  --  120*  < > 116*  < >  --    CO2 20  --  21  --  20  < > 21  < >  --    BUN 84*  --  88*  --  99*  < > 125*  < >  --    CR 1.74*  --  1.75*  --  1.91*  < > 2.62*  < >  --    ANIONGAP 8  --  8  --  9  < > 12  < >  --    JACOB 8.0*  --  7.7*  --  7.8*  < > 8.8  < >  --    *  --  162*  --  99  < > 171*  < >  --    ALBUMIN 2.0*  --   --   --  1.8*  < > 1.8*  --   --    PROTTOTAL 5.6*  --   --   --  5.0*  < > 5.0*  --   --    BILITOTAL 0.5  --   --   --  0.4  < > 0.7  --   --    ALKPHOS 171*  --   --   --  146  < > 116  --   --    ALT 36  --   --   --  26  < > 34  --   --    AST 68*  --   --   --  44  < > 67*  --   --    < > = values in this interval not displayed.  Recent Results (from the past 24 hour(s))   CT Chest Abdomen Pelvis w/o Contrast    Narrative    EXAMINATION: CT CHEST ABDOMEN PELVIS W/O CONTRAST, 7/13/2017 2:36 PM    TECHNIQUE:  Helical CT images from the thoracic inlet through the  symphysis pubis were obtained  without IV contrast.     COMPARISON: CT chest 7/8/2017, outside CT abdomen 7/4/2017    HISTORY: assess infectious process.    FINDINGS:    Chest: Right IJ central venous catheter tip within the high SVC.  Endotracheal tube tip within the mid thoracic trachea. Gastric tube  tip within the stomach. Feeding tube tip within the proximal jejunum.  Stable borderline enlargement of the heart. The aorta and pulmonary  artery are normal in caliber. Coronary  artery calcifications. The  visualized thyroid is within normal limits. No mediastinal, hilar or  axillary lymphadenopathy. Small to moderate right right-sided pleural  effusion, improved from 7/8/2017. The left-sided pleural effusion is  essentially completely resolved with only trace residual fluid. There  is persistent consolidation/atelectasis of the left lower lobe,  despite near resolution of left-sided pleural effusion. There is near  complete atelectasis of the right lower lobe, with improvement of  right upper and middle lobe atelectasis. The aerated parts of the  lungs are clear.    Abdomen and pelvis: Postoperative changes of liver transplant.  Cholecystectomy. Normal unenhanced appearance of the liver.  Cholecystectomy. The pancreas is atrophic. The spleen is enlarged  measuring up to 20.2 cm in craniocaudal dimension, which appears  slightly decreased from 7/4/2017 when it measured 21.2 cm in  craniocaudal dimension. Normal unenhanced appearance of the adrenals  and kidneys. The bladder is decompressed with Crowe catheter in place.  Prostate is within normal limits. Rectal tube is present. Colonic  diverticulosis. No dilated loops of small bowel. Aorta is normal in  caliber. Infrarenal IVC filter. Moderate-large amount of ascites  increased from 7/8/2017. Mildly prominent retroperitoneal lymph nodes  are not significantly changed.     Bones and soft tissues: Moderate soft tissue edema and dependent skin  thickening. New ventral incision with skin staples present. 4.8 x 1.8  cm ventral abdominal wall fluid collection is not significantly  changed, and is located adjacent to a prominent vein coursing through  the ventral abdominal wall. No acute fracture or suspicious bone  lesion.      Impression    IMPRESSION:   1. Improved moderate right-sided pleural effusion and resolution of  left-sided pleural effusion. There remain large areas of  atelectasis/consolidation in both lungs, including in the left  lower  lobe despite resolution of left-sided pleural effusion. Superimposed  infectious process cannot be excluded.  2. Moderate-large amount of ascites increased from 7/8/2017, which  makes it difficult to evaluate for the presence or absence of  inflammatory change or infectious process in the abdomen or pelvis. No  intra-abdominal abscess.  3. Stable small presumed hematoma within the ventral abdominal wall  fat.  4. Splenomegaly.    I have personally reviewed the examination and initial interpretation  and I agree with the findings.    BASILIO SAGASTUME MD

## 2017-07-14 NOTE — PROGRESS NOTES
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     Graft Function: Good function. US liver normal doppler. TB/ALT/AST WNL, Alk phos elevated. Ggt normal.   Immunosuppression Management:   CellCept 250 mg BID. Held since 6/27/17 due to neutropenia. Continue to hold.   Tac goal level 5-8. Since MMF held will aim for goal ~8. Prograf 5 mg BID. Level 7.6, 10 hr trough. Repeat tac level tomorrow for 12 hour trough, timed level to be drawn at 0600.   Cardiorespiratory: Intubated for ex lap, initially requiring pressor support. HDS. Moderate right sided pleural effusion. Chest tube placed 7/9 that was removed 7/12. Vent management per SICU team. Continuing PS trials, neuro status limiting. All sedating medications held.  Hematology: Pancytopenia, acute on chronic, secondary to medications. MMF and bactrim held (since 6/27). Valcyte held on admission. Neupogen 480 mcg given on 7/4 to 7/7,  CMV seronegative and donor seropositive. 7/3 CMV PCR < 137. 7/10 CMV . Continue to hold MMF, bactrim and valcyte.   GI: CT from [place] demonstrating right sided colonic inflammation, AXR on admission with dilated loop of bowel in central abdomen, felt likely to sigmoid. Initial impression consistent with Typhlitis however given worsening abdominal pain on examination, hypotension and increasing lactate levels, elected to proceed with exploratory laparotomy. Findings on reexploration demonstrating persistent inflammation of the right colon without evidence of ischemia. Lactic acid normalized.  NJ placed TF Peptamen titrated to goal. +BMs.    Fluid/Electrolytes/Renal:  EJ, secondary to hypoperfusion, sepsis. Cr 1.7<-1.8<-2.0 (2.1). BUN trending down. Good UO. HypoNa, resolved. Nephrology consulting. Hyperkalemia, PTA on florinef. K stable.   Endocrine: DM II, on insulin gtt.   Infectious disease: Started on Zosyn, Vancomycin, Flagyl, Micafungin on admission (7/4/2017). 7/5 Blood cultures, no growth thus far. Abdominal fluid culture collected intraoperatively, gram stain with no organisms, fungal and bacterial culture, GPCs. Bronchoalveolar lavage growing VRE, colonization. Tx ID consulted.  -Fever > 101 for the past 3 days, WBC 7.7, decreasing. Pan cx (urine, blood, sputum) negative thus far. Continue ertapenem. ID consulted. Exhchanged lines. CT scan w/o contrast to eval for abscess WNL.    -CMV viremia - CMV D+R-, low viral count. IS reduced (MMF held). Monitor for now, weekly CMV PCR. Per ID will plan for diagnostic paracentesis today. Possible VRE peritonitis vs fungal infection. Also possible drug fever.   Neuro: Delirium, hold, limit medications that have CNS SE. Oxycodone PRN pain. Neurology consulted- dx likely toxic metabolic encephalopathy, improving slowly.  Access: R IJ  Vascular: Right Arterial line clot 2/2 previous art line. Vascular consulted. Recommend ASA only. Some evidence of microvascular injury in digits (toes) this is most likely due to injury while patient was on pressor therapy and sepsis.   Prophylaxis: PPI, DVT-SCD  Disposition: SICU    Medical Decision Making: Medium  Admit [id number] (moderate level decision making)    PILLO/Fellow/Resident Provider: Luiza Wing PA-C [id number]    Faculty: Chaparro Anderson M.D.    __________________________________________________________________  Transplant History:.  3/4/2017 DD OLT with Dr. Tran (Liver), Postoperative day: 132     Interval History: History is obtained from EMR and nursing staff.  Overnight events: No acute events o/n. Tmax 101.2.    ROS:   A 10-point review of systems was  negative except as noted above.    Meds:    amLODIPine  5 mg Oral Daily     QUEtiapine  25 mg Oral At Bedtime     tacrolimus  5 mg Oral BID IS     ertapenem (INVanz) IV  1 g Intravenous Q24H     multivitamins with minerals  15 mL Per Feeding Tube Daily     heparin  5,000 Units Subcutaneous Q8H     aspirin  81 mg Oral Daily     lidocaine (viscous)  5 mL Topical Once     pantoprazole  40 mg Oral or Feeding Tube Daily     fludrocortisone  0.1 mg Oral Daily     sodium chloride (PF)  3 mL Intracatheter Q8H       Physical Exam:     Admit Weight: 94.2 kg (207 lb 11.2 oz) (SCALE 2)    Current vitals:   /89  Pulse 90  Temp 100.1  F (37.8  C) (Axillary)  Resp 25  Ht 1.829 m (6')  Wt 92.3 kg (203 lb 7.8 oz)  SpO2 98%  BMI 27.6 kg/m2    Vital sign ranges:    Temp:  [98.1  F (36.7  C)-101.4  F (38.6  C)] 100.1  F (37.8  C)  Heart Rate:  [] 96  Resp:  [20-30] 25  BP: (144-221)/(64-89) 184/89  MAP:  [91 mmHg-109 mmHg] 91 mmHg  Arterial Line BP: (139-168)/(63-78) 149/63  FiO2 (%):  [30 %] 30 %  SpO2:  [98 %-100 %] 98 %  Patient Vitals for the past 24 hrs:   BP Temp Temp src Heart Rate Resp SpO2 Weight   07/14/17 1300 184/89 - - 96 25 98 % -   07/14/17 1200 (!) 208/67 100.1  F (37.8  C) Axillary 97 30 100 % -   07/14/17 1100 190/79 - - 100 25 100 % -   07/14/17 1000 (!) 221/80 - - 105 30 100 % -   07/14/17 0900 194/81 - - 94 22 100 % -   07/14/17 0800 (!) 202/73 101.4  F (38.6  C) Axillary 91 25 99 % -   07/14/17 0727 186/72 - - 94 - - -   07/14/17 0700 - - - 92 - 99 % -   07/14/17 0601 - - - 89 - - -   07/14/17 0600 180/64 - - 90 26 99 % -   07/14/17 0500 187/70 - - 87 - 99 % -   07/14/17 0400 162/74 100.8  F (38.2  C) Axillary 92 25 99 % 92.3 kg (203 lb 7.8 oz)   07/14/17 0300 174/83 - - 89 - 99 % -   07/14/17 0200 146/81 - - 85 26 99 % -   07/14/17 0100 162/71 - - 97 - 99 % -   07/14/17 0000 183/67 101.2  F (38.4  C) Axillary 91 24 99 % -   07/13/17 2300 168/76 - - 87 - 100 % -   07/13/17 2200 183/76 - - 89 26  100 % -   07/13/17 2100 173/81 - - 90 - 99 % -   07/13/17 2000 176/73 98.7  F (37.1  C) Axillary 85 23 99 % -   07/13/17 1900 - - - 87 - 99 % -   07/13/17 1800 - - - 85 - 98 % -   07/13/17 1700 - - - 86 - 99 % -   07/13/17 1600 144/66 98.1  F (36.7  C) Axillary 83 20 100 % -   07/13/17 1500 - - - 85 - 100 % -   07/13/17 1400 - - - 85 21 98 % -     General Appearance: NAD  Skin: normal, warm, dry  Heart: NSR  Lungs: Vented  Abdomen: The abdomen is soft, midline incision is c/d/i and covered. Chevron incision well healed.   : vazquez is present, yellow urine in bag.   Extremities:BLE trace edema.  Neurologic: moving extremities, following commands.     Data:   CMP    Recent Labs  Lab 07/14/17  0349 07/13/17  2343 07/13/17  1536  07/13/17  0344     --  143  < > 149*   POTASSIUM 4.4 4.7 4.3  --  3.9   CHLORIDE 116*  --  115*  --  120*   CO2 20  --  21  --  20   *  --  162*  --  99   BUN 84*  --  88*  --  99*   CR 1.74*  --  1.75*  --  1.91*   GFRESTIMATED 41*  --  41*  --  37*   GFRESTBLACK 50*  --  50*  --  45*   JACOB 8.0*  --  7.7*  --  7.8*   MAG 2.2 2.1 2.1  --  2.3   PHOS 3.7 4.1 4.2  --  4.0   ALBUMIN 2.0*  --   --   --  1.8*   BILITOTAL 0.5  --   --   --  0.4   ALKPHOS 171*  --   --   --  146   AST 68*  --   --   --  44   ALT 36  --   --   --  26   < > = values in this interval not displayed.  CBC    Recent Labs  Lab 07/14/17  0349 07/13/17  1536   HGB 8.4* 7.8*   WBC 6.0 7.1   * 96*     COAGS    Recent Labs  Lab 07/10/17  0340 07/08/17  0902   INR 1.17* 1.22*   PTT  --  38*      Urinalysis  Recent Labs   Lab Test  07/11/17   1105  07/06/17   1441  06/14/17   1508   04/11/16   1345   COLOR  Yellow  Yellow   --    < >  Yellow   APPEARANCE  Clear  Cloudy   --    < >  Clear   URINEGLC  Negative  Negative   --    < >  30*   URINEBILI  Negative  Negative   --    < >  Negative   URINEKETONE  Negative  Negative   --    < >  Negative   SG  1.009  1.014   --    < >  1.016   UBLD  Negative  Moderate*    --    < >  Small*   URINEPH  5.0  5.0   --    < >  5.0   PROTEIN  10*  30*   --    < >  30*   NITRITE  Negative  Negative   --    < >  Negative   LEUKEST  Negative  Trace*   --    < >  Negative   RBCU  <1  >182*   --    < >  1   WBCU  <1  9*   --    < >  1   UTPG   --    --   1.55*   --   0.41*    < > = values in this interval not displayed.       Cultures:   Blood Cultures x2 7/4/2017 NGTD     Abdominal Fluid Culture 7/4/2017: NGTD    Abdominal Fluid Culture 7/5/17: NGTD    Bronchoalveolar Culture 7/5/17: VRE.     Sputum endotracheal gm stain/culture 7/11/17: >25 PMNs/low power field   Few Mixed gram positive elvie    CT scan:    7/4/2017 CONCLUSION:   1. Right colonic wall thickening suggesting colitis. Follow-up is necessary to confirm resolution in order to exclude colonic mass.  2. Transverse colon is not well distended reducing evaluation for wall thickening.  3. Small amount of ascites.  4. Splenomegaly.  5. Prominent portal and splenic veins. If confirmation of vascular patency is indicated consider follow-up ultrasound of the liver with Doppler.  6. Small right pleural effusion.  7. Stranding in the subcutaneous fat of the ventral pelvis.     Abdominal XR 7/4/2017:   1. No evidence of pneumoperitoneum.  2. Dilated loop of bowel in the central abdomen, likely sigmoid.    XR Chest Portable 1 View 7/4/17:  1. Endotracheal tube tip projects 5.3 cm from the chacorta.  2. Increased bilateral pleural effusions, right greater than left.  3. Unchanged bibasilar patchy opacities.

## 2017-07-14 NOTE — PROCEDURES
Brief Operative Report  7/14/2017    Pre-operative Diagnosis: Ascites  Post-operative Diagnosis: Same  Procedure: Paracentesis  Staff: Alexei  Resident: Arden Page, Resident; Roxi Santoyo, Fellow; MILVIA Wright  EBL: 1mL    Findings: 50mL ascites sample taken for labs  Specimen: Ascites fluid  Complication: none  Tubes/Drains: none  Disposition: SICU    Arden Page MD  PGY-2 General Surgery

## 2017-07-15 LAB
ANION GAP SERPL CALCULATED.3IONS-SCNC: 7 MMOL/L (ref 3–14)
APPEARANCE FLD: NORMAL
BASOPHILS # BLD AUTO: 0 10E9/L (ref 0–0.2)
BASOPHILS NFR BLD AUTO: 0.4 %
BUN SERPL-MCNC: 73 MG/DL (ref 7–30)
CALCIUM SERPL-MCNC: 8 MG/DL (ref 8.5–10.1)
CHLORIDE SERPL-SCNC: 120 MMOL/L (ref 94–109)
CO2 SERPL-SCNC: 21 MMOL/L (ref 20–32)
COLOR FLD: NORMAL
CREAT SERPL-MCNC: 1.56 MG/DL (ref 0.66–1.25)
DIFFERENTIAL METHOD BLD: ABNORMAL
EOSINOPHIL # BLD AUTO: 0.1 10E9/L (ref 0–0.7)
EOSINOPHIL NFR BLD AUTO: 1.6 %
EOSINOPHIL NFR FLD MANUAL: 1 %
ERYTHROCYTE [DISTWIDTH] IN BLOOD BY AUTOMATED COUNT: 14.5 % (ref 10–15)
GFR SERPL CREATININE-BSD FRML MDRD: 47 ML/MIN/1.7M2
GLUCOSE BLDC GLUCOMTR-MCNC: 106 MG/DL (ref 70–99)
GLUCOSE BLDC GLUCOMTR-MCNC: 108 MG/DL (ref 70–99)
GLUCOSE BLDC GLUCOMTR-MCNC: 110 MG/DL (ref 70–99)
GLUCOSE BLDC GLUCOMTR-MCNC: 123 MG/DL (ref 70–99)
GLUCOSE BLDC GLUCOMTR-MCNC: 124 MG/DL (ref 70–99)
GLUCOSE BLDC GLUCOMTR-MCNC: 133 MG/DL (ref 70–99)
GLUCOSE BLDC GLUCOMTR-MCNC: 137 MG/DL (ref 70–99)
GLUCOSE BLDC GLUCOMTR-MCNC: 140 MG/DL (ref 70–99)
GLUCOSE BLDC GLUCOMTR-MCNC: 143 MG/DL (ref 70–99)
GLUCOSE BLDC GLUCOMTR-MCNC: 153 MG/DL (ref 70–99)
GLUCOSE BLDC GLUCOMTR-MCNC: 153 MG/DL (ref 70–99)
GLUCOSE BLDC GLUCOMTR-MCNC: 154 MG/DL (ref 70–99)
GLUCOSE BLDC GLUCOMTR-MCNC: 163 MG/DL (ref 70–99)
GLUCOSE BLDC GLUCOMTR-MCNC: 175 MG/DL (ref 70–99)
GLUCOSE BLDC GLUCOMTR-MCNC: 185 MG/DL (ref 70–99)
GLUCOSE BLDC GLUCOMTR-MCNC: 190 MG/DL (ref 70–99)
GLUCOSE SERPL-MCNC: 200 MG/DL (ref 70–99)
GRAM STN SPEC: ABNORMAL
HCT VFR BLD AUTO: 25.5 % (ref 40–53)
HGB BLD-MCNC: 7.9 G/DL (ref 13.3–17.7)
IMM GRANULOCYTES # BLD: 0 10E9/L (ref 0–0.4)
IMM GRANULOCYTES NFR BLD: 0.6 %
KOH PREP SPEC: NORMAL
LYMPHOCYTES # BLD AUTO: 1.1 10E9/L (ref 0.8–5.3)
LYMPHOCYTES NFR BLD AUTO: 21.1 %
LYMPHOCYTES NFR FLD MANUAL: 57 %
MCH RBC QN AUTO: 27.6 PG (ref 26.5–33)
MCHC RBC AUTO-ENTMCNC: 31 G/DL (ref 31.5–36.5)
MCV RBC AUTO: 89 FL (ref 78–100)
MICRO REPORT STATUS: ABNORMAL
MICRO REPORT STATUS: NORMAL
MONOCYTES # BLD AUTO: 0.1 10E9/L (ref 0–1.3)
MONOCYTES NFR BLD AUTO: 2.5 %
MONOS+MACROS NFR FLD MANUAL: 38 %
NEUTROPHILS # BLD AUTO: 3.8 10E9/L (ref 1.6–8.3)
NEUTROPHILS NFR BLD AUTO: 73.8 %
NEUTS BAND NFR FLD MANUAL: 4 %
NRBC # BLD AUTO: 0 10*3/UL
NRBC BLD AUTO-RTO: 0 /100
PLATELET # BLD AUTO: 141 10E9/L (ref 150–450)
POTASSIUM SERPL-SCNC: 4.8 MMOL/L (ref 3.4–5.3)
RBC # BLD AUTO: 2.86 10E12/L (ref 4.4–5.9)
SODIUM SERPL-SCNC: 148 MMOL/L (ref 133–144)
SPECIMEN SOURCE FLD: NORMAL
SPECIMEN SOURCE: ABNORMAL
SPECIMEN SOURCE: NORMAL
TACROLIMUS BLD-MCNC: 5.2 UG/L (ref 5–15)
TME LAST DOSE: NORMAL H
WBC # BLD AUTO: 5.2 10E9/L (ref 4–11)
WBC # FLD AUTO: 381 /UL

## 2017-07-15 PROCEDURE — 25000131 ZZH RX MED GY IP 250 OP 636 PS 637: Performed by: PHYSICIAN ASSISTANT

## 2017-07-15 PROCEDURE — 00000146 ZZHCL STATISTIC GLUCOSE BY METER IP

## 2017-07-15 PROCEDURE — 31624 DX BRONCHOSCOPE/LAVAGE: CPT

## 2017-07-15 PROCEDURE — 80197 ASSAY OF TACROLIMUS: CPT | Performed by: TRANSPLANT SURGERY

## 2017-07-15 PROCEDURE — 87106 FUNGI IDENTIFICATION YEAST: CPT | Performed by: SURGERY

## 2017-07-15 PROCEDURE — 25000128 H RX IP 250 OP 636: Performed by: SURGERY

## 2017-07-15 PROCEDURE — 31622 DX BRONCHOSCOPE/WASH: CPT | Mod: GC | Performed by: SURGERY

## 2017-07-15 PROCEDURE — 25000132 ZZH RX MED GY IP 250 OP 250 PS 637

## 2017-07-15 PROCEDURE — 25000132 ZZH RX MED GY IP 250 OP 250 PS 637: Performed by: STUDENT IN AN ORGANIZED HEALTH CARE EDUCATION/TRAINING PROGRAM

## 2017-07-15 PROCEDURE — 87205 SMEAR GRAM STAIN: CPT | Performed by: SURGERY

## 2017-07-15 PROCEDURE — 20000004 ZZH R&B ICU UMMC

## 2017-07-15 PROCEDURE — 40000275 ZZH STATISTIC RCP TIME EA 10 MIN

## 2017-07-15 PROCEDURE — 87040 BLOOD CULTURE FOR BACTERIA: CPT | Performed by: NURSE PRACTITIONER

## 2017-07-15 PROCEDURE — 85025 COMPLETE CBC W/AUTO DIFF WBC: CPT | Performed by: NURSE PRACTITIONER

## 2017-07-15 PROCEDURE — 87210 SMEAR WET MOUNT SALINE/INK: CPT | Performed by: SURGERY

## 2017-07-15 PROCEDURE — 25000128 H RX IP 250 OP 636: Performed by: STUDENT IN AN ORGANIZED HEALTH CARE EDUCATION/TRAINING PROGRAM

## 2017-07-15 PROCEDURE — 25000128 H RX IP 250 OP 636: Performed by: NURSE PRACTITIONER

## 2017-07-15 PROCEDURE — 80048 BASIC METABOLIC PNL TOTAL CA: CPT | Performed by: NURSE PRACTITIONER

## 2017-07-15 PROCEDURE — 87070 CULTURE OTHR SPECIMN AEROBIC: CPT | Performed by: SURGERY

## 2017-07-15 PROCEDURE — 25000132 ZZH RX MED GY IP 250 OP 250 PS 637: Performed by: SURGERY

## 2017-07-15 PROCEDURE — 27210437 ZZH NUTRITION PRODUCT SEMIELEM INTERMED LITER

## 2017-07-15 PROCEDURE — 25000132 ZZH RX MED GY IP 250 OP 250 PS 637: Performed by: NURSE PRACTITIONER

## 2017-07-15 PROCEDURE — 87633 RESP VIRUS 12-25 TARGETS: CPT | Performed by: SURGERY

## 2017-07-15 PROCEDURE — 36415 COLL VENOUS BLD VENIPUNCTURE: CPT | Performed by: INTERNAL MEDICINE

## 2017-07-15 PROCEDURE — 94003 VENT MGMT INPAT SUBQ DAY: CPT

## 2017-07-15 PROCEDURE — 25000128 H RX IP 250 OP 636

## 2017-07-15 PROCEDURE — 25000125 ZZHC RX 250: Performed by: NURSE PRACTITIONER

## 2017-07-15 PROCEDURE — 87077 CULTURE AEROBIC IDENTIFY: CPT | Performed by: SURGERY

## 2017-07-15 PROCEDURE — 87186 SC STD MICRODIL/AGAR DIL: CPT | Performed by: SURGERY

## 2017-07-15 PROCEDURE — 87798 DETECT AGENT NOS DNA AMP: CPT | Performed by: SURGERY

## 2017-07-15 PROCEDURE — 36415 COLL VENOUS BLD VENIPUNCTURE: CPT | Performed by: NURSE PRACTITIONER

## 2017-07-15 PROCEDURE — 87040 BLOOD CULTURE FOR BACTERIA: CPT | Performed by: INTERNAL MEDICINE

## 2017-07-15 PROCEDURE — 87102 FUNGUS ISOLATION CULTURE: CPT | Performed by: SURGERY

## 2017-07-15 RX ORDER — GLYCOPYRROLATE 0.2 MG/ML
0.1 INJECTION, SOLUTION INTRAMUSCULAR; INTRAVENOUS 2 TIMES DAILY
Status: DISCONTINUED | OUTPATIENT
Start: 2017-07-15 | End: 2017-07-16

## 2017-07-15 RX ORDER — HYDRALAZINE HYDROCHLORIDE 20 MG/ML
20 INJECTION INTRAMUSCULAR; INTRAVENOUS EVERY 6 HOURS PRN
Status: DISCONTINUED | OUTPATIENT
Start: 2017-07-15 | End: 2017-09-08 | Stop reason: HOSPADM

## 2017-07-15 RX ORDER — FENTANYL CITRATE 50 UG/ML
200 INJECTION, SOLUTION INTRAMUSCULAR; INTRAVENOUS ONCE
Status: COMPLETED | OUTPATIENT
Start: 2017-07-15 | End: 2017-07-15

## 2017-07-15 RX ORDER — FENTANYL CITRATE 50 UG/ML
INJECTION, SOLUTION INTRAMUSCULAR; INTRAVENOUS
Status: COMPLETED
Start: 2017-07-15 | End: 2017-07-15

## 2017-07-15 RX ADMIN — PANTOPRAZOLE SODIUM 40 MG: 40 TABLET, DELAYED RELEASE ORAL at 08:21

## 2017-07-15 RX ADMIN — HYDROMORPHONE HYDROCHLORIDE 0.5 MG: 1 INJECTION, SOLUTION INTRAMUSCULAR; INTRAVENOUS; SUBCUTANEOUS at 00:51

## 2017-07-15 RX ADMIN — QUETIAPINE FUMARATE 25 MG: 25 TABLET, FILM COATED ORAL at 20:13

## 2017-07-15 RX ADMIN — HYDROMORPHONE HYDROCHLORIDE 0.5 MG: 1 INJECTION, SOLUTION INTRAMUSCULAR; INTRAVENOUS; SUBCUTANEOUS at 08:46

## 2017-07-15 RX ADMIN — GLYCOPYRROLATE 0.1 MG: 0.2 INJECTION, SOLUTION INTRAMUSCULAR; INTRAVENOUS at 20:10

## 2017-07-15 RX ADMIN — ERTAPENEM SODIUM 1 G: 1 INJECTION, POWDER, LYOPHILIZED, FOR SOLUTION INTRAMUSCULAR; INTRAVENOUS at 10:13

## 2017-07-15 RX ADMIN — ACETAMINOPHEN 650 MG: 325 TABLET, FILM COATED ORAL at 06:37

## 2017-07-15 RX ADMIN — ACETAMINOPHEN 650 MG: 325 TABLET, FILM COATED ORAL at 00:22

## 2017-07-15 RX ADMIN — FLUDROCORTISONE ACETATE 0.1 MG: 0.1 TABLET ORAL at 08:43

## 2017-07-15 RX ADMIN — Medication 0.2 MG: at 10:12

## 2017-07-15 RX ADMIN — MULTIVIT AND MINERALS-FERROUS GLUCONATE 9 MG IRON/15 ML ORAL LIQUID 15 ML: at 08:43

## 2017-07-15 RX ADMIN — ACETAMINOPHEN 650 MG: 325 TABLET, FILM COATED ORAL at 20:58

## 2017-07-15 RX ADMIN — HEPARIN SODIUM 5000 UNITS: 5000 INJECTION, SOLUTION INTRAVENOUS; SUBCUTANEOUS at 12:28

## 2017-07-15 RX ADMIN — ASPIRIN 81 MG CHEWABLE TABLET 81 MG: 81 TABLET CHEWABLE at 08:43

## 2017-07-15 RX ADMIN — HYDROMORPHONE HYDROCHLORIDE 0.5 MG: 1 INJECTION, SOLUTION INTRAMUSCULAR; INTRAVENOUS; SUBCUTANEOUS at 12:47

## 2017-07-15 RX ADMIN — HEPARIN SODIUM 5000 UNITS: 5000 INJECTION, SOLUTION INTRAVENOUS; SUBCUTANEOUS at 03:47

## 2017-07-15 RX ADMIN — FENTANYL CITRATE 200 MCG: 50 INJECTION, SOLUTION INTRAMUSCULAR; INTRAVENOUS at 16:07

## 2017-07-15 RX ADMIN — VALGANCICLOVIR 450 MG: 450 TABLET, FILM COATED ORAL at 08:43

## 2017-07-15 RX ADMIN — TACROLIMUS 5 MG: 5 CAPSULE ORAL at 18:07

## 2017-07-15 RX ADMIN — HYDROMORPHONE HYDROCHLORIDE 0.5 MG: 1 INJECTION, SOLUTION INTRAMUSCULAR; INTRAVENOUS; SUBCUTANEOUS at 06:37

## 2017-07-15 RX ADMIN — GLYCOPYRROLATE 0.1 MG: 0.2 INJECTION, SOLUTION INTRAMUSCULAR; INTRAVENOUS at 10:11

## 2017-07-15 RX ADMIN — AMLODIPINE BESYLATE 5 MG: 10 TABLET ORAL at 08:21

## 2017-07-15 RX ADMIN — HEPARIN SODIUM 5000 UNITS: 5000 INJECTION, SOLUTION INTRAVENOUS; SUBCUTANEOUS at 20:10

## 2017-07-15 RX ADMIN — Medication 0.2 MG: at 20:13

## 2017-07-15 RX ADMIN — TACROLIMUS 5 MG: 5 CAPSULE ORAL at 08:21

## 2017-07-15 RX ADMIN — OXYCODONE HYDROCHLORIDE 2.5 MG: 5 TABLET ORAL at 10:11

## 2017-07-15 RX ADMIN — OXYCODONE HYDROCHLORIDE 2.5 MG: 5 TABLET ORAL at 00:51

## 2017-07-15 RX ADMIN — MIDAZOLAM 4 MG: 1 INJECTION INTRAMUSCULAR; INTRAVENOUS at 16:07

## 2017-07-15 NOTE — PROGRESS NOTES
Procedure Report  7/15/2017    Diagnosis: Fever, thick respiratory secretions   Procedure: Bronchoscopy, bronchalveolar lavage   Staff: Alexei   Resident: Natanael   EBL: 0  Anesthesia: Sedation with 4 mg versed, 200 mcg fentanyl   Findings: Thick secretions in all lobes, mostly left lower lobe   Specimen: LLL bacterial culture and gram stain, KOH and fungus cultures, PCP culture, respiratory viral panel PCR, CMV PCR  Complication: None immediate, pt tolerated procedure   Without complications   Tubes/Drains: None   Disposition: SICU       [facility] [id number]

## 2017-07-15 NOTE — PROGRESS NOTES
[facility]  Transplant Infectious Disease Progress Note     Patient:  Camacho Bhagat, Date of birth 1965, Medical record number [id number]  Date of Visit:  07/15/2017         Assessment and Recommendations:   Recommendations:  - continue ertapenem for now  - await cultures from diagnostic paracentesis - SAAG is borderline but technically exudative 90.9)  - consider starting Tigecycline (loading dose is 100mg x 1 then start 50mg QD)  to cover potential VRE while cultures from ascites incubate - as he is colonized with this and to this point we have not covered it  - follow weekly CMV PCR   - we have resumed valcyte as his PCR continues to be elevated (his counts have now improved)  - agree with decrease in immunosuppression    ID will follow with you. I will cover this weekend. Dr. Naseem Figueroa will be following the patient starting on Monday.  Jenna Velarde MD   of Medicine, Division of Infectious Diseases  [facility] [phone]      Assessment: 51 y.o. 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 He is now s/p ex-lap and washout with wound closure.      Colitis - he developed acute onset of pain. He is s/p ex-lap on 7/4 showing inflamed cecum and ascending colon. He had wound closure on 7/5. Cultures from intra-op showed staph capitis  in anaerobic culture  He has been maintained on ertapenem which is adequate coverage but he continues to have fevers. CT scan noted below    Fever - unclear if this is due to ongoing infection. Organisms not covered by ertapenem would be VRE (which he is colonized with) or pseudomonas. Paracentesis is borderline but appears exudative. WBC are 350 but differential not done (?). DDx peritonitis vs drug fever from the beta-lactam      Pancytopenia - ongoing since early June. Likely related to his immunosuppression. Heme/onc now following. Agree with decrease in  immunosuppressive regimen. His counts are improving  Valcyte given improved counts and CMV+ PCR that is new from blood. Continue to hold bactrim     CMV+ PCR  - likely due to level of immunosuppression and cessation of valcyte. He is high risk being D+/R- and thus would check CMV DNA PCR weekly while off valcyte. Would resume valcyte at this point - his counts have improved    VRE in sputum - isolated from BAL. He otherwise has had minimal Oxygen requirements and unchanged imaging. He is colonized with VRE (rectal swab). Would start tigecycline as above     Other ID issues:  Bacterial PPx: on antibiotics for colitis  PJP ppx: off bactrim 2/2 leukopenia  Fungal Ppx: none  SeroStatus: CMV D+/R-, EBV D+/R-  Gamma Globulin Status: last checked in 2011  Immunization Status: not yet done        Interval History:   Continues to be intubated. Awake and able to follow commands this AM. Continues to be febrile. Today he reports that he has abdominal pain.     Transplants:  3/4/2017 (Liver), Postoperative day:  133.  Coordinator Abril Doty    Review of Systems:  unable to obtain 2/2 clinical condition          Current Medications & Allergies:       glycopyrrolate  0.1 mg Intravenous BID     cloNIDine  0.2 mg Oral BID     amLODIPine  5 mg Oral Daily     valGANciclovir  450 mg Oral Daily     QUEtiapine  25 mg Oral At Bedtime     tacrolimus  5 mg Oral BID IS     ertapenem (INVanz) IV  1 g Intravenous Q24H     multivitamins with minerals  15 mL Per Feeding Tube Daily     heparin  5,000 Units Subcutaneous Q8H     aspirin  81 mg Oral Daily     lidocaine (viscous)  5 mL Topical Once     pantoprazole  40 mg Oral or Feeding Tube Daily     fludrocortisone  0.1 mg Oral Daily     sodium chloride (PF)  3 mL Intracatheter Q8H       Infusions/Drips:    NaCl 10 mL/hr at 07/14/17 1015     IV fluid REPLACEMENT ONLY       insulin (regular) 3 Units/hr (07/15/17 0827)       Allergies   Allergen Reactions     Erythromycin GI Disturbance      Vioxx      Nausea, vomiting            Physical Exam:   Vitals were reviewed.  All vitals stable.  Patient Vitals for the past 24 hrs:   BP Temp Temp src Resp SpO2 Weight   07/15/17 1000 (!) 164/106 - Axillary 29 99 % -   07/15/17 0910 157/86 - - 20 - -   07/15/17 0900 (!) 231/93 - - (!) 35 99 % -   07/15/17 0815 (!) 213/86 - - 28 99 % -   07/15/17 0804 (!) 222/89 - - - - -   07/15/17 0800 (!) 223/77 98.7  F (37.1  C) Axillary 29 100 % -   07/15/17 0600 - - - 22 99 % -   07/15/17 0500 (!) 215/85 - - 26 99 % -   07/15/17 0400 196/83 99.9  F (37.7  C) Oral 22 100 % -   07/15/17 0300 189/82 - - 23 100 % -   07/15/17 0200 174/74 - - 24 99 % -   07/15/17 0130 - - - - 98 % -   07/15/17 0100 180/80 - - 24 97 % -   07/15/17 0000 173/79 102.6  F (39.2  C) Oral 26 99 % 94.5 kg (208 lb 5.4 oz)   07/14/17 2300 182/84 - - 24 99 % -   07/14/17 2200 190/82 - - 26 100 % -   07/14/17 2100 (!) 139/101 - - 22 100 % -   07/14/17 2000 (!) 202/70 99.8  F (37.7  C) Oral 25 98 % -   07/14/17 1900 184/78 - - 26 98 % -   07/14/17 1800 193/73 - - 30 97 % -   07/14/17 1700 185/75 - - 25 100 % -   07/14/17 1600 168/69 100.4  F (38  C) Axillary 20 99 % -   07/14/17 1515 149/63 - - - 97 % -   07/14/17 1500 178/68 - - 16 97 % -   07/14/17 1445 (!) 216/89 - - - 97 % -   07/14/17 1440 (!) 192/97 - - - 98 % -   07/14/17 1400 - - - 30 - -   07/14/17 1300 184/89 - - 25 98 % -   07/14/17 1200 (!) 208/67 100.1  F (37.8  C) Axillary 30 100 % -   07/14/17 1100 190/79 - - 25 100 % -     Ranges for vital signs:  Temp:  [98.7  F (37.1  C)-102.6  F (39.2  C)] 98.7  F (37.1  C)  Heart Rate:  [] 99  Resp:  [16-35] 29  BP: (139-231)/() 164/106  FiO2 (%):  [30 %] 30 %  SpO2:  [97 %-100 %] 99 %  Vitals:    07/13/17 0400 07/14/17 0400 07/15/17 0000   Weight: 93.9 kg (207 lb 0.2 oz) 92.3 kg (203 lb 7.8 oz) 94.5 kg (208 lb 5.4 oz)       Physical Examination:  GENERAL:  well-developed, well-nourished,in ICU bed, intubated  HEAD:  Head is normocephalic,  atraumatic   ENT:  ETT in place  LUNGS: coarse throughout  CARDIOVASCULAR:  Regular rate and rhythm with no murmurs,   ABDOMEN:  +BS,  surgical incision staples in place - no surrounding erythema. Abd distended  SKIN: Areas of digit ischemia noted on L send toe, R 2nd and 3rd toes, and R finger. These are reportedly chronic and related to pressors. No acute rashes.   NEUROLOGIC:  Awake, following some commands, shaking head yes and no         Laboratory Data:       Inflammatory Markers    Recent Labs   Lab Test  07/05/17   1810  03/05/17   0358  11/23/16   0813  11/16/16   0706  11/15/16   1039  11/07/16   0759   08/15/11   1151  04/11/11   1209  01/03/11   1246  02/15/10   1232   SED   --    --   45*   --    --    --    --   11  14  17*  25*   CRP  354.0  20.0*  58.0*  59.1*  49.8*  66.0*   < >  11.1*  9.0*  11.7*  17.5*    < > = values in this interval not displayed.       Immune Globulin Studies     Recent Labs   Lab Test  08/15/11   1243   IGG  1160   IGG1  734   IGG2  294   IGG3  7*   IGG4  59       Metabolic Studies       Recent Labs   Lab Test  07/15/17   0627  07/14/17   0349   07/11/17   0328   07/10/17   0340   07/06/17   0316   02/22/17   0643   NA  148*  144   < >  150*   < >  149*   < >  143   < >  133   POTASSIUM  4.8  4.4   < >  3.8   < >  3.3*   < >  5.0   < >  4.8   CHLORIDE  120*  116*   < >  120*   < >  116*   < >  116*   < >  100   CO2  21  20   < >  20   < >  21   < >  18*   < >  22   ANIONGAP  7  8   < >  11   < >  12   < >  9   < >  12   BUN  73*  84*   < >  140*   < >  125*   < >  62*   < >  46*   CR  1.56*  1.74*   < >  2.57*   < >  2.62*   < >  2.75*   < >  1.40*   GFRESTIMATED  47*  41*   < >  26*   < >  26*   < >  24*   < >  53*   GLC  200*  187*   < >  137*   < >  171*   < >  139*   < >  147*   A1C   --    --    --    --    --    --    --   Canceled, Test credited   Below Assay Range  NOTIFIED LEONARD ONEILL AT 0538 ON 7/6/17 BY CHRISSY     --    --    JACOB  8.0*  8.0*   < >  8.2*   < >  8.8    < >  6.9*   < >  9.0   PHOS   --   3.7   < >  4.4   < >  3.5   < >  5.5*   < >   --    MAG   --   2.2   < >  2.3   < >  2.4*   < >  2.1   < >   --    LACT   --    --    --   0.9   --   0.9   < >  1.5   < >  1.9   PCAL   --    --    --    --    --    --    --    --    --   1.76    < > = values in this interval not displayed.       Hepatic Studies    Recent Labs   Lab Test  07/14/17   0349 07/13/17   0344 07/12/17   0342 07/05/17   1810   06/28/12   1234   BILITOTAL  0.5  0.4  0.4   < >   --    < >  1.7*   BILIDELTA   --    --    --    --    --    --   0.5*   BILICONJ   --    --    --    --    --    --   0.0   DBIL  0.2  0.2  0.2   < >   --    < >   --    ALKPHOS  171*  146  168*   < >   --    < >  146   PROTTOTAL  5.6*  5.0*  5.0*   < >   --    < >  6.3*   ALBUMIN  2.0*  1.8*  1.8*   < >   --    < >  3.5*   AST  68*  44  29   < >   --    < >  41   ALT  36  26  22   < >   --    < >  41   LDH   --    --    --    --   289*   --    --     < > = values in this interval not displayed.       Pancreatitis testing    Recent Labs   Lab Test  07/12/17   0342   03/05/17   0358  03/03/17   1458  02/21/17   1520  12/09/16   1159   09/30/10   1734   AMYLASE   --    --   53  70   --    --    --   82   LIPASE   --    --   216   --   326  161   --   138   TRIG  114   < >   --    --    --    --    < >   --     < > = values in this interval not displayed.       Gout Labs    No lab results found.    Hematology Studies      Recent Labs   Lab Test  07/15/17   0627  07/14/17   0349  07/13/17   1536  07/13/17   0344  07/12/17   1643   07/12/17   0342   WBC  5.2  6.0  7.1  7.7  9.2   --   9.0   ANEU  3.8  4.9  6.0  6.4  8.2   --   8.1   ALYM  1.1  0.9  0.5*  0.8  0.5*   --   0.2*   ZINA  0.1  0.1  0.4  0.3  0.2   --   0.0   AEOS  0.1  0.1  0.1  0.1  0.0   --   0.1   HGB  7.9*  8.4*  7.8*  7.6*  8.4*   < >  8.0*   HCT  25.5*  26.5*  24.3*  24.1*  26.5*   --   25.5*   PLT  141*  115*  96*  100*  104*   --   84*    < > = values in this  interval not displayed.       Clotting Studies    Recent Labs   Lab Test  07/10/17   0340  07/08/17   0902  07/06/17   0316  07/06/17   0011   INR  1.17*  1.22*  1.80*  1.91*   PTT   --   38*  47*  48*       Iron Testing    Recent Labs   Lab Test  07/15/17   0627   07/06/17   1228   07/05/17   1810   02/08/16   1144   IRON   --    --    --    --    --    --   93   FEB   --    --    --    --    --    --   230*   IRONSAT   --    --    --    --    --    --   41   MCV  89   < >  87   < >  86   < >  92   HAPT   --    --    --    --   162   < >   --    RETP   --    --   1.6   < >   --    < >   --    RETICABSCT   --    --   39.4   < >   --    < >   --     < > = values in this interval not displayed.       Markers    Alpha Fetoprotein   Date Value Ref Range Status   02/03/2017  0 - 8 ug/L Final    <1.5  Assay Method:  Chemiluminescence using Siemens Centaur XP         Autoimmune Testing    Recent Labs   Lab Test  01/03/11   1246  02/15/10   1232   JESUS MANUEL  <1.0   --    RHF   --   10   ENASSA   --   0   ENASSB   --   0       Arterial Blood Gas Testing    Recent Labs   Lab Test  07/08/17   0902  07/06/17   2334  07/06/17   0015  07/05/17   2026  07/05/17   1810   PH  7.32*  7.26*  7.27*  7.27*  7.28*   PCO2  35  37  35  35  37   PO2  96  85  109*  155*  188*   HCO3  18*  17*  16*  16*  17*   O2PER  30  30.0  30.0  40  50.0        Thyroid Studies     Recent Labs   Lab Test  02/08/16   1144  04/11/11   1209  02/15/10   1232  07/31/09   1254   TSH  3.35  2.77  2.53  1.95   T4   --   1.23  0.90   --        Urine Studies     Recent Labs   Lab Test  07/11/17   1105  07/06/17   1441  06/06/17   1605  03/24/17   1315  03/16/17   1010   URINEPH  5.0  5.0  5.0  5.5  5.0   NITRITE  Negative  Negative  Negative  Negative  Negative   LEUKEST  Negative  Trace*  Negative  Negative  Negative   WBCU  <1  9*  1  1  5*       CSF testing     Recent Labs   Lab Test  06/11/09   0225   CWBC  1   CRBC  2   CGLU  104*   CTP  54       Medication levels     Recent Labs   Lab Test  07/13/17   0344   07/06/17   0316   VANCOMYCIN   --    --   22.1   TACROL  7.6   < >  6.3    < > = values in this interval not displayed.       Microbiology:  Beta D Glucan levels (Fungitell assay)    No lab results found.    Last Culture results with specimen source  Culture Micro   Date Value Ref Range Status   07/14/2017 Culture negative monitoring continues  Final   07/13/2017 Culture negative monitoring continues  Final   07/13/2017 Culture negative monitoring continues  Final   07/12/2017   Final    Heavy growth Normal respiratory elvie  Culture in progress     07/12/2017 (A)  Final    Yeast isolated Presumptive identification: Candida albicans / dubliniensis  Candida albicans and Candida dubliniensis are not routinely speciated     07/11/2017 (A)  Final    Heavy growth Coagulase negative Staphylococcus Susceptibility testing not   routinely done  Moderate growth Enterococcus faecium (VRE)     07/11/2017 No growth after 4 days  Final   07/11/2017 No growth after 4 days  Final   07/05/2017 No growth  Final   07/05/2017 No growth  Final    Specimen Description   Date Value Ref Range Status   07/14/2017 Ascites  Final   07/14/2017 Ascites  Final   07/13/2017 Venous blood  Final   07/13/2017 Venous blood  Final   07/12/2017 Bronchial lavage Left lower lobe  Final   07/12/2017 Bronchial lavage Left lower lobe  Final   07/12/2017 Bronchial lavage Left lower lobe  Final   07/12/2017 Bronchial lavage Left lower lobe  Final   07/11/2017 Sputum Endotracheal  Final   07/11/2017 Sputum Endotracheal  Final        Last check of C difficile  C Diff Toxin B PCR   Date Value Ref Range Status   10/27/2016  NEG Final    Negative  Negative: Clostridium difficile target DNA sequences NOT detected, presumed   negative for Clostridium difficile toxin B or the number of bacteria present   may be below the limit of detection for the test.   FDA approved assay performed using LOVEFiLM real-time PCR.    A negative result does not exclude actual disease due to Clostridium difficile   and may be due to improper collection, handling and storage of the specimen or   the number of organisms in the specimen is below the detection limit of the   assay.         Virology:  Log IU/mL of CMVQNT   Date Value Ref Range Status   07/10/2017 2.2 (H) <2.1 [Log_IU]/mL Final   07/03/2017 <2.1 <2.1 [Log_IU]/mL Final   06/26/2017 Not Calculated <2.1 [Log_IU]/mL Final       Imaging:  Recent Results (from the past 48 hour(s))   CT Chest Abdomen Pelvis w/o Contrast    Narrative    EXAMINATION: CT CHEST ABDOMEN PELVIS W/O CONTRAST, 7/13/2017 2:36 PM    TECHNIQUE:  Helical CT images from the thoracic inlet through the  symphysis pubis were obtained  without IV contrast.     COMPARISON: CT chest 7/8/2017, outside CT abdomen 7/4/2017    HISTORY: assess infectious process.    FINDINGS:    Chest: Right IJ central venous catheter tip within the high SVC.  Endotracheal tube tip within the mid thoracic trachea. Gastric tube  tip within the stomach. Feeding tube tip within the proximal jejunum.  Stable borderline enlargement of the heart. The aorta and pulmonary  artery are normal in caliber. Coronary artery calcifications. The  visualized thyroid is within normal limits. No mediastinal, hilar or  axillary lymphadenopathy. Small to moderate right right-sided pleural  effusion, improved from 7/8/2017. The left-sided pleural effusion is  essentially completely resolved with only trace residual fluid. There  is persistent consolidation/atelectasis of the left lower lobe,  despite near resolution of left-sided pleural effusion. There is near  complete atelectasis of the right lower lobe, with improvement of  right upper and middle lobe atelectasis. The aerated parts of the  lungs are clear.    Abdomen and pelvis: Postoperative changes of liver transplant.  Cholecystectomy. Normal unenhanced appearance of the liver.  Cholecystectomy. The pancreas is  atrophic. The spleen is enlarged  measuring up to 20.2 cm in craniocaudal dimension, which appears  slightly decreased from 7/4/2017 when it measured 21.2 cm in  craniocaudal dimension. Normal unenhanced appearance of the adrenals  and kidneys. The bladder is decompressed with Crowe catheter in place.  Prostate is within normal limits. Rectal tube is present. Colonic  diverticulosis. No dilated loops of small bowel. Aorta is normal in  caliber. Infrarenal IVC filter. Moderate-large amount of ascites  increased from 7/8/2017. Mildly prominent retroperitoneal lymph nodes  are not significantly changed.     Bones and soft tissues: Moderate soft tissue edema and dependent skin  thickening. New ventral incision with skin staples present. 4.8 x 1.8  cm ventral abdominal wall fluid collection is not significantly  changed, and is located adjacent to a prominent vein coursing through  the ventral abdominal wall. No acute fracture or suspicious bone  lesion.      Impression    IMPRESSION:   1. Improved moderate right-sided pleural effusion and resolution of  left-sided pleural effusion. There remain large areas of  atelectasis/consolidation in both lungs, including in the left lower  lobe despite resolution of left-sided pleural effusion. Superimposed  infectious process cannot be excluded.  2. Moderate-large amount of ascites increased from 7/8/2017, which  makes it difficult to evaluate for the presence or absence of  inflammatory change or infectious process in the abdomen or pelvis. No  intra-abdominal abscess.  3. Stable small presumed hematoma within the ventral abdominal wall  fat.  4. Splenomegaly.    I have personally reviewed the examination and initial interpretation  and I agree with the findings.    BASILIO SAGASTUME MD   XR Chest Port 1 View    Narrative    Exam: XR CHEST PORT 1 VW, 7/14/2017 5:27 PM    Indication: resp failure    Comparison: CT 7/13/2017 and x-ray dated 7/12/ 2017    Findings:   Endotracheal  tube projects 5 cm above the chacorta. Enteric tubes have  infradiaphragmatic course, projecting beyond the field of view.  Cardiac silhouette is largely obscured. Trachea is midline. No  pneumothorax. Visualized upper abdomen is unremarkable. Bibasilar and  perihilar opacities, unchanged compared with previous radiograph with  unchanged bilateral pleural effusion.      Impression    Impression:  Unchanged bilateral pleural effusion with associated bibasilar  atelectasis versus consolidation. Perihilar edema or atelectasis is  also unchanged.    I have personally reviewed the examination and initial interpretation  and I agree with the findings.    BASILIO SAGASTUME MD

## 2017-07-15 NOTE — PROGRESS NOTES
[facility], [place]    SICU Service  Progress Note    Date of Service (when I saw the patient): 07/15/2017     Assessment & Plan   Camacho Bhagat is a 51 year old male who was admitted on 7/4/2017 with abdominal pain and fever.  He was found to have neutropenic colitis.  He underwent liver transplant March 2017 for CASTAÑEDA.    Procedures:  7/4/2017   Exploratory laparotomy due to concern for perforation, viable hepatic flexure   7/5/2017    Abdominal washout and closure  7/5/2017    Left axillary arterial line, bronchoscopy and BAL   7/9/2017    Right pleural pigtail catheter, removed 7/12 7//11/2017 Bronchoscopy  NEUROLOGIC:  # Toxic metabolic encephalopathy   - pain: Tylenol 650 mg PO Q4H PRN, oxycodone 2.5 mg q6  - no sedation   - neurology consulted 7/11,  AMS likely 2/2 toxic encephalopathy  - Seroquel at HS to try to help normalize sleep schedule and reduce delirium.     CV:  #Septic shock; resolved   #History of DVT with IVC filter  #Distal right radial artery occlusion   -ASA for radial artery occlusion per vascular surgery  #Hypertension  - Will resume PTA Amlodipine     PULMONARY:  #Acute respiratory failure, hypoxia  #Right pleural effusion s/p pigtail placement   - CMV 22, 440, PEEP 5, 30%  - Remains intubated, mental status improving but remains weak; continue daily PST.  - R pigtail removed 7/12; repeat CXR   - Copious oral secretions, robinal BID started     RENAL:  #EJ  #Hypernatremia   - UOP adquate  - EJ improving, creatinine improving. Nephrology s/o  - Hypernatremia worsened. TKO fluids and increase free water via feeding tube to 100 ml Q1 hours  - Uremia improving,     ID:  #Neutropenic colitis   #VRE HAP pneumonia  #Fevers  - Continue ertapenem for total of 2 weeks epiric treatment of colitis  - CT abdomen without abscess formation  - Pan cultured 7/11; No growth to date  #CMV PCR elevated from blood sample 7/11  - Resume Valcyte per ID   - Follow weekly CMV PCR  while off valcyte.     HEME:  #Pancytopenia  #Acute blood loss with chronic anemia  # History of DVT with IVC filter  - Plts stable  - Hgb stable   - Neupogen 480 mcg given 7/4-7/7   - US 7/11 showing distal right radial artery occlusion, vascular surgery consulted, recommended ASA 81daily     GI/NUTRITION:  #Protein/caloriedeficit malnutrition   #Colitis, s/p ex laparotomy (7/5)  # CASTAÑEDA s/p liver transplant (3/2017)  - Off TPN.  Advance TF to goal at goal =80  - Senna, Miralax PRN; diarrhea.   - Rectal tube for ongoing loose stools      ENDOCRINE:  #DMII  - Continue home fludrocortisone 0.1 mg daily  - Continue insulin drip.  Transition to long acting and SSI when nutrition at stable state      MSK:  #Immobilty   - PT/OT   - Mobilize     VASCULAR ACCESS:  - [place]  - PIV       GENERAL CARES  DVT Prophylaxis: Heparin SQ  GI Prophylaxis: PPI  Restraints: Restraints for medical healing needed: YES  Code status:  Full  Disp:  SICU    Makenna Cordova    Time Spent on this Encounter   Billing:  I spent 45 minutes bedside and on the inpatient unit today managing the critical care of Camacho Bhagat in relation to the issues listed in this note.    Interval History   No acute events.  Lots of oral secretions.  Unable to answer ROS at the time of this exam.  Awake, following some commands    Physical Exam   Temp: 99.8  F (37.7  C) Temp src: Axillary Temp  Min: 98.7  F (37.1  C)  Max: 102.6  F (39.2  C) BP: (!) 143/97   Heart Rate: 98 Resp: 30 SpO2: 98 % O2 Device: Mechanical Ventilator    Vitals:    07/14/17 0400 07/15/17 0000 07/15/17 1200   Weight: 92.3 kg (203 lb 7.8 oz) 94.5 kg (208 lb 5.4 oz) 92.9 kg (204 lb 12.9 oz)     I/O last 3 completed shifts:  In: 2846.55 [I.V.:561.55; NG/GT:555]  Out: 3875 [Urine:3425; Emesis/NG output:450]    GEN: AA  EYES: PERRL, Anicteric sclera.   HEENT:  Normocephalic, atraumatic, trachea midline, ETT secure  CV: RRR, no gallops, rubs, or murmurs  PULM/CHEST: coarse junky breath sounds  bilaterally without rhonchi, crackles or wheeze, symmetric chest rise  GI: normal bowel sounds, soft, non-tender, no guarding, no masses  : vazquez catheter in place, urine yellow and clear  EXTREMITIES: +  peripheral edema, moving all extremities, peripheral pulses intact  NEURO: Cranial nerves II-XII grossly intact, no focal deficits noted  SKIN: No rashes, sores or ulcerations  PSYCH:  Affect: anxious  Imaging personally reviewed: no new  ECG  SR

## 2017-07-15 NOTE — PROGRESS NOTES
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     Graft Function: Good function. US liver normal doppler. TB/ALT/AST WNL, Alk phos elevated. Ggt normal.     Immunosuppression Management:   CellCept 250 mg BID. Held since 6/27/17 due to neutropenia. Continue to hold.   Tac goal level 5-8. Since MMF held will aim for goal ~8. Prograf 5 mg BID. Level appropriate, 10 hr trough.     Cardiorespiratory: Intubated for ex lap, initially requiring pressor support. HDS. Moderate right sided pleural effusion. Chest tube placed 7/9 that was removed 7/12. Vent management per SICU team. Continuing PS trials, neuro status limiting. All sedating medications held.  Will attempt extubation in next 24hrs.    Hematology: Pancytopenia, acute on chronic, secondary to medications. MMF and bactrim held (since 6/27). Valcyte held on admission. Neupogen 480 mcg given on 7/4 to 7/7,  CMV seronegative and donor seropositive. 7/3 CMV PCR < 137. 7/10 CMV . Continue to hold MMF, bactrim and valcyte.     GI: CT from [place] demonstrating right sided colonic inflammation, AXR on admission with dilated loop of bowel in central abdomen, felt likely to sigmoid. Initial impression consistent with Typhlitis however given worsening abdominal pain on examination, hypotension and increasing lactate levels, elected to proceed with exploratory laparotomy. Findings on reexploration demonstrating persistent inflammation of the right colon without evidence of ischemia. Lactic acid normalized.  NJ placed TF Peptamen titrated to goal. +BMs.      Fluid/Electrolytes/Renal:  EJ, secondary to hypoperfusion, sepsis. Cr 1.7<-1.8<-2.0 (2.1). BUN trending down. Good UO. HypoNa, resolved. Nephrology consulting. Hyperkalemia, PTA on florinef. K stable.     Endocrine: DM II, on insulin gtt.     Infectious disease: Started on Zosyn, Vancomycin, Flagyl, Micafungin on admission (7/4/2017). 7/5 Blood cultures, no growth thus far. Abdominal fluid culture collected intraoperatively, gram stain with no organisms, fungal and bacterial culture, GPCs. Bronchoalveolar lavage growing VRE, colonization. Tx ID consulted.  -Fever > 101 for the past 3 days, WBC 7.7, decreasing. Pan cx (urine, blood, sputum) negative thus far. Continue ertapenem. ID consulted. Exhchanged lines. CT scan w/o contrast to eval for abscess WNL.    -CMV viremia - CMV D+R-, low viral count. IS reduced (MMF held). Monitor for now, weekly CMV PCR. Per ID will plan for diagnostic paracentesis today. Possible VRE peritonitis vs fungal infection. Also possible drug fever.     Neuro: Delirium, hold, limit medications that have CNS SE. Oxycodone PRN pain. Neurology consulted- dx likely toxic metabolic encephalopathy, improving slowly.    Access: R IJ  Vascular: Right Arterial line clot 2/2 previous art line. Vascular consulted. Recommend ASA only. Some evidence of microvascular injury in digits (toes) this is most likely due to injury while patient was on pressor therapy and sepsis.     Prophylaxis: PPI, DVT-SCD  Disposition: SICU    Medical Decision Making: Medium  Admit 10902 (moderate level decision making)    PILLO/Fellow/Resident Provider: Tay  Faculty: Chaparro Anderson M.D.    __________________________________________________________________  Transplant History:.  3/4/2017 DD OLT with Dr. Tran (Liver), Postoperative day: 133     Interval History: History is obtained from EMR and nursing staff.  Overnight events: No acute events o/n. Continues to spike high fevers.    ROS:   A 10-point review  of systems was negative except as noted above.    Meds:    amLODIPine  5 mg Oral Daily     valGANciclovir  450 mg Oral Daily     QUEtiapine  25 mg Oral At Bedtime     tacrolimus  5 mg Oral BID IS     ertapenem (INVanz) IV  1 g Intravenous Q24H     multivitamins with minerals  15 mL Per Feeding Tube Daily     heparin  5,000 Units Subcutaneous Q8H     aspirin  81 mg Oral Daily     lidocaine (viscous)  5 mL Topical Once     pantoprazole  40 mg Oral or Feeding Tube Daily     fludrocortisone  0.1 mg Oral Daily     sodium chloride (PF)  3 mL Intracatheter Q8H       Physical Exam:     Admit Weight: 94.2 kg (207 lb 11.2 oz) (SCALE 2)    Current vitals:   BP (!) 213/86  Pulse 90  Temp 98.7  F (37.1  C) (Axillary)  Resp 28  Ht 1.829 m (6')  Wt 94.5 kg (208 lb 5.4 oz)  SpO2 99%  BMI 28.26 kg/m2    Vital sign ranges:    Temp:  [98.7  F (37.1  C)-102.6  F (39.2  C)] 98.7  F (37.1  C)  Heart Rate:  [] 98  Resp:  [16-30] 28  BP: (139-223)/() 213/86  FiO2 (%):  [30 %] 30 %  SpO2:  [97 %-100 %] 99 %  Patient Vitals for the past 24 hrs:   BP Temp Temp src Heart Rate Resp SpO2 Weight   07/15/17 0815 (!) 213/86 - - 98 28 99 % -   07/15/17 0804 (!) 222/89 - - 95 - - -   07/15/17 0800 (!) 223/77 98.7  F (37.1  C) Axillary 97 29 100 % -   07/15/17 0600 - - - 95 22 99 % -   07/15/17 0500 (!) 215/85 - - 95 26 99 % -   07/15/17 0400 196/83 99.9  F (37.7  C) Oral 93 22 100 % -   07/15/17 0300 189/82 - - 96 23 100 % -   07/15/17 0200 174/74 - - 95 24 99 % -   07/15/17 0130 - - - 99 - 98 % -   07/15/17 0100 180/80 - - 105 24 97 % -   07/15/17 0000 173/79 102.6  F (39.2  C) Oral 105 26 99 % 94.5 kg (208 lb 5.4 oz)   07/14/17 2300 182/84 - - 103 24 99 % -   07/14/17 2200 190/82 - - 101 26 100 % -   07/14/17 2100 (!) 139/101 - - 94 22 100 % -   07/14/17 2000 (!) 202/70 99.8  F (37.7  C) Oral 97 25 98 % -   07/14/17 1900 184/78 - - 98 26 98 % -   07/14/17 1800 193/73 - - 103 30 97 % -   07/14/17 1700 185/75 - - 99 25 100 % -    07/14/17 1600 168/69 100.4  F (38  C) Axillary 97 20 99 % -   07/14/17 1515 149/63 - - 94 - 97 % -   07/14/17 1500 178/68 - - 103 16 97 % -   07/14/17 1445 (!) 216/89 - - 101 - 97 % -   07/14/17 1440 (!) 192/97 - - 100 - 98 % -   07/14/17 1400 - - - - 30 - -   07/14/17 1300 184/89 - - 96 25 98 % -   07/14/17 1200 (!) 208/67 100.1  F (37.8  C) Axillary 97 30 100 % -   07/14/17 1100 190/79 - - 100 25 100 % -   07/14/17 1000 (!) 221/80 - - 105 30 100 % -   07/14/17 0900 194/81 - - 94 22 100 % -     General Appearance: NAD  Skin: normal, warm, dry  Heart: NSR  Lungs: Vented  Abdomen: The abdomen is soft, midline incision is c/d/i and covered. Chevron incision well healed.   : vazquez is present, yellow urine in bag.   Extremities:BLE trace edema.  Neurologic: moving extremities, following commands.     Data:   CMP    Recent Labs  Lab 07/15/17  0627 07/14/17  1517 07/14/17  0349 07/13/17  2343  07/13/17  0344   *  --  144  --   < > 149*   POTASSIUM 4.8  --  4.4 4.7  < > 3.9   CHLORIDE 120*  --  116*  --   < > 120*   CO2 21  --  20  --   < > 20   *  --  187*  --   < > 99   BUN 73*  --  84*  --   < > 99*   CR 1.56*  --  1.74*  --   < > 1.91*   GFRESTIMATED 47*  --  41*  --   < > 37*   GFRESTBLACK 57*  --  50*  --   < > 45*   JACOB 8.0*  --  8.0*  --   < > 7.8*   MAG  --   --  2.2 2.1  < > 2.3   PHOS  --   --  3.7 4.1  < > 4.0   ALBUMIN  --   --  2.0*  --   --  1.8*   BILITOTAL  --   --  0.5  --   --  0.4   ALKPHOS  --   --  171*  --   --  146   AST  --   --  68*  --   --  44   ALT  --   --  36  --   --  26   FAMY  --  6  --   --   --   --    < > = values in this interval not displayed.  CBC    Recent Labs  Lab 07/15/17  0627 07/14/17  0349   HGB 7.9* 8.4*   WBC 5.2 6.0   * 115*     COAGS    Recent Labs  Lab 07/10/17  0340 07/08/17  0902   INR 1.17* 1.22*   PTT  --  38*      Urinalysis  Recent Labs   Lab Test  07/11/17   1105  07/06/17   1441  06/14/17   1508   04/11/16   1345   COLOR  Yellow  Yellow    --    < >  Yellow   APPEARANCE  Clear  Cloudy   --    < >  Clear   URINEGLC  Negative  Negative   --    < >  30*   URINEBILI  Negative  Negative   --    < >  Negative   URINEKETONE  Negative  Negative   --    < >  Negative   SG  1.009  1.014   --    < >  1.016   UBLD  Negative  Moderate*   --    < >  Small*   URINEPH  5.0  5.0   --    < >  5.0   PROTEIN  10*  30*   --    < >  30*   NITRITE  Negative  Negative   --    < >  Negative   LEUKEST  Negative  Trace*   --    < >  Negative   RBCU  <1  >182*   --    < >  1   WBCU  <1  9*   --    < >  1   UTPG   --    --   1.55*   --   0.41*    < > = values in this interval not displayed.       Cultures:   Blood Cultures x2 7/4/2017 NGTD     Abdominal Fluid Culture 7/4/2017: NGTD    Abdominal Fluid Culture 7/5/17: NGTD    Bronchoalveolar Culture 7/5/17: VRE.     Sputum endotracheal gm stain/culture 7/11/17: >25 PMNs/low power field   Few Mixed gram positive elvie    CT scan:    7/4/2017 CONCLUSION:   1. Right colonic wall thickening suggesting colitis. Follow-up is necessary to confirm resolution in order to exclude colonic mass.  2. Transverse colon is not well distended reducing evaluation for wall thickening.  3. Small amount of ascites.  4. Splenomegaly.  5. Prominent portal and splenic veins. If confirmation of vascular patency is indicated consider follow-up ultrasound of the liver with Doppler.  6. Small right pleural effusion.  7. Stranding in the subcutaneous fat of the ventral pelvis.     Abdominal XR 7/4/2017:   1. No evidence of pneumoperitoneum.  2. Dilated loop of bowel in the central abdomen, likely sigmoid.    XR Chest Portable 1 View 7/4/17:  1. Endotracheal tube tip projects 5.3 cm from the chacorta.  2. Increased bilateral pleural effusions, right greater than left.  3. Unchanged bibasilar patchy opacities.    Attestation:    The patient has been seen and evaluated by me.   Vital signs, labs, medications and orders were reviewed.   When obtained, diagnostic  images were reviewed by me and interpreted as above.    The care plan was discussed with the multidisciplinary team and I agree with the findings and plan in this note, with any differences recorded in blue.    Immunosuppressive medication management was reviewed and adjusted as reflected in the note and orders.     .

## 2017-07-15 NOTE — PLAN OF CARE
Problem: Goal Outcome Summary  Goal: Goal Outcome Summary  Outcome: No Change  D: s/p neutropenic colitis, s/p liver transplant (3/2017) for CASTAÑEDA.       Neuro: PERRL, follows commands intermittently, resisting and refusing care at times. Restless and agitated with turns and cares. Move all extremities purposefully however weak.   CV: SR 90's to sinus tachy low-100's. No ectopy noted. BP are within parameters, no intervention required. 2+ pulses in lower extremities and left arm. No pulse on right arm d/t occulusion on right radial artery.   Pulm: Vented with minimal settings. See flowsheet. LS are coarse to clear and diminished at the base. Large secretion via ET requiring frequent suction. Broncho.  GI/: Bowel sounds are hyperactive with loose and watery stool. NG is LIS. NJ with tube feeds at goal with q1hr 100 flushes. Crowe in placed with adequate UOP.  Skin: Multiple black and red fingers and toes. At times, pt appears painful to touch. Abdomen incision is CDI.   Lines: PIVx2  Gtts: Insulin at 1, TKO      Plan: Will continue to monitor per plan of care. Notify MD with concerns.

## 2017-07-15 NOTE — PLAN OF CARE
Problem: Goal Outcome Summary  Goal: Goal Outcome Summary  Outcome: No Change  D: s/p neutropenic colitis, s/p liver transplant (3/2017) for CASTAÑEDA.      Neuro: PERRL, follows commands appropriately, refusing care at times. Restless and agitated with turns and cares. Move all extremities purposefully.  CV: SR 90's to sinus tachy low-100's. Hypertensive-BP cuff on left leg, pt moves leg during readings. MD is aware and not to treat. 2+ pulses in lower extremities and left arm. No pulse on right arm d/t occulusion on right radial artery.  Pulm: Vented with minimal settings. See flowsheet. LS are coarse throughout. Large secretion via ET requiring frequent suction. Pt had two incidents where SpO2 dropped to mid 80's d/t large secretion and suctioning.   GI/: Bowel sounds are hyperactive with loose and watery stool. NG is LIS. NJ with tube feeds at goal. Crowe in placed with adequate UOP.     Plan: Will continue to monitor per plan of care. Notify MD with concerns.

## 2017-07-16 ENCOUNTER — APPOINTMENT (OUTPATIENT)
Dept: GENERAL RADIOLOGY | Facility: CLINIC | Age: 52
End: 2017-07-16
Attending: NURSE PRACTITIONER
Payer: COMMERCIAL

## 2017-07-16 LAB
ALBUMIN SERPL-MCNC: 1.8 G/DL (ref 3.4–5)
ALP SERPL-CCNC: 134 U/L (ref 40–150)
ALT SERPL W P-5'-P-CCNC: 44 U/L (ref 0–70)
ANION GAP SERPL CALCULATED.3IONS-SCNC: 3 MMOL/L (ref 3–14)
ANION GAP SERPL CALCULATED.3IONS-SCNC: 6 MMOL/L (ref 3–14)
AST SERPL W P-5'-P-CCNC: 72 U/L (ref 0–45)
BACTERIA SPEC CULT: ABNORMAL
BACTERIA SPEC CULT: NO GROWTH
BASOPHILS # BLD AUTO: 0 10E9/L (ref 0–0.2)
BASOPHILS NFR BLD AUTO: 0.6 %
BILIRUB DIRECT SERPL-MCNC: 0.1 MG/DL (ref 0–0.2)
BILIRUB SERPL-MCNC: 0.3 MG/DL (ref 0.2–1.3)
BUN SERPL-MCNC: 65 MG/DL (ref 7–30)
BUN SERPL-MCNC: 71 MG/DL (ref 7–30)
CALCIUM SERPL-MCNC: 7.6 MG/DL (ref 8.5–10.1)
CALCIUM SERPL-MCNC: 7.8 MG/DL (ref 8.5–10.1)
CHLORIDE SERPL-SCNC: 118 MMOL/L (ref 94–109)
CHLORIDE SERPL-SCNC: 123 MMOL/L (ref 94–109)
CMV DNA SPEC NAA+PROBE-ACNC: 4225 [IU]/ML
CMV DNA SPEC NAA+PROBE-LOG#: 3.6 {LOG_IU}/ML
CO2 SERPL-SCNC: 22 MMOL/L (ref 20–32)
CO2 SERPL-SCNC: 23 MMOL/L (ref 20–32)
CREAT SERPL-MCNC: 1.57 MG/DL (ref 0.66–1.25)
CREAT SERPL-MCNC: 1.66 MG/DL (ref 0.66–1.25)
DIFFERENTIAL METHOD BLD: ABNORMAL
EOSINOPHIL # BLD AUTO: 0.1 10E9/L (ref 0–0.7)
EOSINOPHIL NFR BLD AUTO: 1.1 %
ERYTHROCYTE [DISTWIDTH] IN BLOOD BY AUTOMATED COUNT: 14.7 % (ref 10–15)
FLUAV H1 2009 PAND RNA SPEC QL NAA+PROBE: NEGATIVE
FLUAV H1 RNA SPEC QL NAA+PROBE: NEGATIVE
FLUAV H3 RNA SPEC QL NAA+PROBE: NEGATIVE
FLUAV RNA SPEC QL NAA+PROBE: NEGATIVE
FLUBV RNA SPEC QL NAA+PROBE: NEGATIVE
GFR SERPL CREATININE-BSD FRML MDRD: 44 ML/MIN/1.7M2
GFR SERPL CREATININE-BSD FRML MDRD: 46 ML/MIN/1.7M2
GLUCOSE BLDC GLUCOMTR-MCNC: 110 MG/DL (ref 70–99)
GLUCOSE BLDC GLUCOMTR-MCNC: 117 MG/DL (ref 70–99)
GLUCOSE BLDC GLUCOMTR-MCNC: 125 MG/DL (ref 70–99)
GLUCOSE BLDC GLUCOMTR-MCNC: 125 MG/DL (ref 70–99)
GLUCOSE BLDC GLUCOMTR-MCNC: 132 MG/DL (ref 70–99)
GLUCOSE BLDC GLUCOMTR-MCNC: 136 MG/DL (ref 70–99)
GLUCOSE BLDC GLUCOMTR-MCNC: 137 MG/DL (ref 70–99)
GLUCOSE BLDC GLUCOMTR-MCNC: 140 MG/DL (ref 70–99)
GLUCOSE BLDC GLUCOMTR-MCNC: 149 MG/DL (ref 70–99)
GLUCOSE BLDC GLUCOMTR-MCNC: 165 MG/DL (ref 70–99)
GLUCOSE BLDC GLUCOMTR-MCNC: 165 MG/DL (ref 70–99)
GLUCOSE BLDC GLUCOMTR-MCNC: 185 MG/DL (ref 70–99)
GLUCOSE BLDC GLUCOMTR-MCNC: 189 MG/DL (ref 70–99)
GLUCOSE BLDC GLUCOMTR-MCNC: 74 MG/DL (ref 70–99)
GLUCOSE BLDC GLUCOMTR-MCNC: 78 MG/DL (ref 70–99)
GLUCOSE SERPL-MCNC: 128 MG/DL (ref 70–99)
GLUCOSE SERPL-MCNC: 83 MG/DL (ref 70–99)
HADV DNA SPEC QL NAA+PROBE: NEGATIVE
HADV DNA SPEC QL NAA+PROBE: NEGATIVE
HCT VFR BLD AUTO: 24.4 % (ref 40–53)
HGB BLD-MCNC: 7.4 G/DL (ref 13.3–17.7)
HMPV RNA SPEC QL NAA+PROBE: NEGATIVE
HPIV1 RNA SPEC QL NAA+PROBE: NEGATIVE
HPIV2 RNA SPEC QL NAA+PROBE: NEGATIVE
HPIV3 RNA SPEC QL NAA+PROBE: NEGATIVE
IMM GRANULOCYTES # BLD: 0 10E9/L (ref 0–0.4)
IMM GRANULOCYTES NFR BLD: 0.2 %
LYMPHOCYTES # BLD AUTO: 1.6 10E9/L (ref 0.8–5.3)
LYMPHOCYTES NFR BLD AUTO: 30.4 %
MAGNESIUM SERPL-MCNC: 2.4 MG/DL (ref 1.6–2.3)
MCH RBC QN AUTO: 27.5 PG (ref 26.5–33)
MCHC RBC AUTO-ENTMCNC: 30.3 G/DL (ref 31.5–36.5)
MCV RBC AUTO: 91 FL (ref 78–100)
MICRO REPORT STATUS: ABNORMAL
MICRO REPORT STATUS: NORMAL
MICROBIOLOGIST REVIEW: ABNORMAL
MICROORGANISM SPEC CULT: ABNORMAL
MISCELLANEOUS TEST: NORMAL
MONOCYTES # BLD AUTO: 0.2 10E9/L (ref 0–1.3)
MONOCYTES NFR BLD AUTO: 3.9 %
NEUTROPHILS # BLD AUTO: 3.4 10E9/L (ref 1.6–8.3)
NEUTROPHILS NFR BLD AUTO: 63.8 %
NRBC # BLD AUTO: 0 10*3/UL
NRBC BLD AUTO-RTO: 0 /100
PHOSPHATE SERPL-MCNC: 3.8 MG/DL (ref 2.5–4.5)
PLATELET # BLD AUTO: 141 10E9/L (ref 150–450)
POTASSIUM SERPL-SCNC: 5 MMOL/L (ref 3.4–5.3)
POTASSIUM SERPL-SCNC: 5.1 MMOL/L (ref 3.4–5.3)
PROT SERPL-MCNC: 5.3 G/DL (ref 6.8–8.8)
RBC # BLD AUTO: 2.69 10E12/L (ref 4.4–5.9)
RESULT: NORMAL
RHINOVIRUS RNA SPEC QL NAA+PROBE: ABNORMAL
RSV RNA SPEC QL NAA+PROBE: NEGATIVE
RSV RNA SPEC QL NAA+PROBE: NEGATIVE
SEND OUTS MISC TEST CODE: NORMAL
SEND OUTS MISC TEST SPECIMEN: NORMAL
SODIUM SERPL-SCNC: 148 MMOL/L (ref 133–144)
SODIUM SERPL-SCNC: 148 MMOL/L (ref 133–144)
SPECIMEN SOURCE: ABNORMAL
SPECIMEN SOURCE: NORMAL
TEST NAME: NORMAL
WBC # BLD AUTO: 5.4 10E9/L (ref 4–11)

## 2017-07-16 PROCEDURE — 25000132 ZZH RX MED GY IP 250 OP 250 PS 637: Performed by: STUDENT IN AN ORGANIZED HEALTH CARE EDUCATION/TRAINING PROGRAM

## 2017-07-16 PROCEDURE — 80048 BASIC METABOLIC PNL TOTAL CA: CPT | Performed by: NURSE PRACTITIONER

## 2017-07-16 PROCEDURE — 25000132 ZZH RX MED GY IP 250 OP 250 PS 637: Performed by: NURSE PRACTITIONER

## 2017-07-16 PROCEDURE — 84100 ASSAY OF PHOSPHORUS: CPT | Performed by: NURSE PRACTITIONER

## 2017-07-16 PROCEDURE — 20000004 ZZH R&B ICU UMMC

## 2017-07-16 PROCEDURE — 00000146 ZZHCL STATISTIC GLUCOSE BY METER IP

## 2017-07-16 PROCEDURE — 80076 HEPATIC FUNCTION PANEL: CPT | Performed by: NURSE PRACTITIONER

## 2017-07-16 PROCEDURE — 25000128 H RX IP 250 OP 636: Performed by: STUDENT IN AN ORGANIZED HEALTH CARE EDUCATION/TRAINING PROGRAM

## 2017-07-16 PROCEDURE — 85025 COMPLETE CBC W/AUTO DIFF WBC: CPT | Performed by: NURSE PRACTITIONER

## 2017-07-16 PROCEDURE — 25000125 ZZHC RX 250: Performed by: PHYSICIAN ASSISTANT

## 2017-07-16 PROCEDURE — 25000128 H RX IP 250 OP 636: Performed by: NURSE PRACTITIONER

## 2017-07-16 PROCEDURE — 27210437 ZZH NUTRITION PRODUCT SEMIELEM INTERMED LITER

## 2017-07-16 PROCEDURE — 25000125 ZZHC RX 250: Performed by: NURSE PRACTITIONER

## 2017-07-16 PROCEDURE — 36415 COLL VENOUS BLD VENIPUNCTURE: CPT | Performed by: NURSE PRACTITIONER

## 2017-07-16 PROCEDURE — 25000132 ZZH RX MED GY IP 250 OP 250 PS 637

## 2017-07-16 PROCEDURE — 83735 ASSAY OF MAGNESIUM: CPT | Performed by: NURSE PRACTITIONER

## 2017-07-16 PROCEDURE — 94640 AIRWAY INHALATION TREATMENT: CPT

## 2017-07-16 PROCEDURE — 25000131 ZZH RX MED GY IP 250 OP 636 PS 637: Performed by: STUDENT IN AN ORGANIZED HEALTH CARE EDUCATION/TRAINING PROGRAM

## 2017-07-16 PROCEDURE — 94003 VENT MGMT INPAT SUBQ DAY: CPT

## 2017-07-16 PROCEDURE — 40000986 XR ABDOMEN PORT F1 VW

## 2017-07-16 PROCEDURE — 25000132 ZZH RX MED GY IP 250 OP 250 PS 637: Performed by: SURGERY

## 2017-07-16 PROCEDURE — 40000275 ZZH STATISTIC RCP TIME EA 10 MIN

## 2017-07-16 RX ORDER — OXYCODONE HYDROCHLORIDE 5 MG/1
5-10 TABLET ORAL EVERY 6 HOURS PRN
Status: DISCONTINUED | OUTPATIENT
Start: 2017-07-16 | End: 2017-07-18

## 2017-07-16 RX ORDER — HYDROMORPHONE HYDROCHLORIDE 1 MG/ML
.3-.5 INJECTION, SOLUTION INTRAMUSCULAR; INTRAVENOUS; SUBCUTANEOUS
Status: DISCONTINUED | OUTPATIENT
Start: 2017-07-16 | End: 2017-08-07

## 2017-07-16 RX ORDER — IPRATROPIUM BROMIDE AND ALBUTEROL SULFATE 2.5; .5 MG/3ML; MG/3ML
3 SOLUTION RESPIRATORY (INHALATION) EVERY 4 HOURS PRN
Status: COMPLETED | OUTPATIENT
Start: 2017-07-16 | End: 2017-07-16

## 2017-07-16 RX ORDER — DEXTROSE MONOHYDRATE 50 MG/ML
INJECTION, SOLUTION INTRAVENOUS CONTINUOUS
Status: DISCONTINUED | OUTPATIENT
Start: 2017-07-16 | End: 2017-07-17

## 2017-07-16 RX ADMIN — OXYCODONE HYDROCHLORIDE 5 MG: 5 TABLET ORAL at 18:16

## 2017-07-16 RX ADMIN — Medication 0.2 MG: at 19:42

## 2017-07-16 RX ADMIN — VALGANCICLOVIR 450 MG: 450 TABLET, FILM COATED ORAL at 09:15

## 2017-07-16 RX ADMIN — Medication 0.2 MG: at 09:15

## 2017-07-16 RX ADMIN — HEPARIN SODIUM 5000 UNITS: 5000 INJECTION, SOLUTION INTRAVENOUS; SUBCUTANEOUS at 04:46

## 2017-07-16 RX ADMIN — OXYCODONE HYDROCHLORIDE 5 MG: 5 TABLET ORAL at 16:03

## 2017-07-16 RX ADMIN — TACROLIMUS 6 MG: 5 CAPSULE ORAL at 09:15

## 2017-07-16 RX ADMIN — FLUDROCORTISONE ACETATE 0.1 MG: 0.1 TABLET ORAL at 09:15

## 2017-07-16 RX ADMIN — ASPIRIN 81 MG CHEWABLE TABLET 81 MG: 81 TABLET CHEWABLE at 09:15

## 2017-07-16 RX ADMIN — MULTIVIT AND MINERALS-FERROUS GLUCONATE 9 MG IRON/15 ML ORAL LIQUID 15 ML: at 10:59

## 2017-07-16 RX ADMIN — DEXTROSE MONOHYDRATE: 50 INJECTION, SOLUTION INTRAVENOUS at 11:02

## 2017-07-16 RX ADMIN — TACROLIMUS 6 MG: 5 CAPSULE ORAL at 17:50

## 2017-07-16 RX ADMIN — ERTAPENEM SODIUM 1 G: 1 INJECTION, POWDER, LYOPHILIZED, FOR SOLUTION INTRAMUSCULAR; INTRAVENOUS at 11:02

## 2017-07-16 RX ADMIN — AMLODIPINE BESYLATE 5 MG: 10 TABLET ORAL at 09:15

## 2017-07-16 RX ADMIN — PANTOPRAZOLE SODIUM 40 MG: 40 TABLET, DELAYED RELEASE ORAL at 09:15

## 2017-07-16 RX ADMIN — ACETAMINOPHEN 650 MG: 325 TABLET, FILM COATED ORAL at 01:59

## 2017-07-16 RX ADMIN — OXYCODONE HYDROCHLORIDE 5 MG: 5 TABLET ORAL at 10:58

## 2017-07-16 RX ADMIN — HUMAN INSULIN 6 UNITS/HR: 100 INJECTION, SOLUTION SUBCUTANEOUS at 17:14

## 2017-07-16 RX ADMIN — ACETAMINOPHEN 650 MG: 325 TABLET, FILM COATED ORAL at 09:15

## 2017-07-16 RX ADMIN — HUMAN INSULIN 2 UNITS/HR: 100 INJECTION, SOLUTION SUBCUTANEOUS at 09:12

## 2017-07-16 RX ADMIN — IPRATROPIUM BROMIDE AND ALBUTEROL SULFATE 3 ML: .5; 3 SOLUTION RESPIRATORY (INHALATION) at 10:25

## 2017-07-16 RX ADMIN — HEPARIN SODIUM 5000 UNITS: 5000 INJECTION, SOLUTION INTRAVENOUS; SUBCUTANEOUS at 19:41

## 2017-07-16 RX ADMIN — OXYCODONE HYDROCHLORIDE 2.5 MG: 5 TABLET ORAL at 01:59

## 2017-07-16 RX ADMIN — HEPARIN SODIUM 5000 UNITS: 5000 INJECTION, SOLUTION INTRAVENOUS; SUBCUTANEOUS at 13:01

## 2017-07-16 ASSESSMENT — PAIN DESCRIPTION - DESCRIPTORS
DESCRIPTORS: ACHING
DESCRIPTORS: CONSTANT;ACHING
DESCRIPTORS: CONSTANT

## 2017-07-16 NOTE — PROGRESS NOTES
[facility], [place]    SICU Service  Progress Note    Date of Service (when I saw the patient): 07/16/2017     Assessment & Plan   Camacho Bhagat is a 51 year old male who was admitted on 7/4/2017 with abdominal pain and fever.  He was found to have neutropenic colitis.  He underwent liver transplant March 2017 for CASTAÑEDA.    Procedures:  7/4/2017   Exploratory laparotomy due to concern for perforation, viable hepatic flexure   7/5/2017    Abdominal washout and closure  7/5/2017    Left axillary arterial line, bronchoscopy and BAL   7/9/2017    Right pleural pigtail catheter, removed 7/12 7//11/2017 Bronchoscopy  7/13/2017 Bronchoscopy    NEUROLOGIC:  # Toxic metabolic encephalopathy   # Acute pain  - pain: Tylenol 650 mg PO Q4H PRN, oxycodone 5-10 mg q6, Dilaudid 0.3-.5mg q2 IV PRN  - no sedation   - neurology consulted 7/11,  AMS likely 2/2 toxic encephalopathy  - Will stop seroquel due to concern for drug fever.     CV:  #Septic shock; resolved   #History of DVT with IVC filter  #Distal right radial artery occlusion   -ASA for radial artery occlusion per vascular surgery  #Hypertension  - PTA Amlodipine 5mg, clonidine     PULMONARY:  #Acute respiratory failure, hypoxia  #Right pleural effusion s/p pigtail placement   - Patient extubated, maintaining saturations  - Mental status improving but remains weak; will need aggressive pulmonary cares  - R pigtail removed 7/12  - Copious oral secretions, robinal BID dc'd now extubated.     RENAL:  #EJ  #Hypernatremia   - UOP adquate  - EJ improving, creatinine improving. Nephrology s/o  - Hypernatremia worsened. TKO fluids and increase free water via feeding tube to 100 ml Q1 hours  - Uremia improving  - Will start dextrose 5%@100mL/hr     ID:  #Neutropenic colitis   #VRE HAP pneumonia  #Fevers  - Continue ertapenem for total of 2 weeks epiric treatment of colitis  - CT abdomen without abscess formation  - Pan cultured 7/11; No growth to  date  #CMV PCR elevated from blood sample 7/11  - Valcyte per ID      HEME:  #Pancytopenia  #Acute blood loss with chronic anemia  # History of DVT with IVC filter  - Plts stable  - Hgb stable   - Neupogen 480 mcg given 7/4-7/7   - US 7/11 showing distal right radial artery occlusion, vascular surgery consulted, ASA 81daily     GI/NUTRITION:  #Protein/caloriedeficit malnutrition   #Colitis, s/p ex laparotomy (7/5)  # CASTAÑEDA s/p liver transplant (3/2017)  - Off TPN.  Advance TF to goal at goal =80  - Senna, Miralax PRN; diarrhea.   - Rectal tube for ongoing loose stools      ENDOCRINE:  #DMII  - Continue home fludrocortisone 0.1 mg daily  - Continue insulin drip.  Transition to long acting and SSI when nutrition at stable state      MSK:  #Immobilty   - PT/OT   - Mobilize     VASCULAR ACCESS:  - NJ  - PIV       GENERAL CARES  DVT Prophylaxis: Heparin SQ  GI Prophylaxis: PPI  Restraints: Restraints for medical healing needed: YES  Code status:  Full  Disp:  SICU    Walter Cordova NP    Time Spent on this Encounter   Billing:  I spent 45 minutes bedside and on the inpatient unit today managing the critical care of Makennafrida Cordova in relation to the issues listed in this note.    Interval History   No acute events. Patient extubated, complaining of pain in his hip and abdomen. Moving all extremities. Limited communication.    Physical Exam   Temp: 99.6  F (37.6  C) Temp src: Axillary Temp  Min: 97.8  F (36.6  C)  Max: 103.3  F (39.6  C) BP: 165/89   Heart Rate: 92 Resp: 24 SpO2: 100 % O2 Device: Nasal cannula Oxygen Delivery: 3 LPM  Vitals:    07/15/17 0000 07/15/17 1200 07/16/17 0600   Weight: 94.5 kg (208 lb 5.4 oz) 92.9 kg (204 lb 12.9 oz) 93.5 kg (206 lb 2.1 oz)     I/O last 3 completed shifts:  In: 4209.72 [I.V.:269.72; NG/GT:2100]  Out: 3720 [Urine:2670; Emesis/NG output:950; Stool:100]    GEN: Patient communicating at times, somewhat grumpy.  EYES: PERRL, Anicteric sclera.   HEENT:  Normocephalic,  atraumatic, trachea midline  CV: RRR, no gallops, rubs, or murmurs  PULM/CHEST: coarse breath sounds bilaterally without crackles or wheeze, symmetric chest rise  GI: normal bowel sounds, soft, non-tender, no guarding, no masses  : vazquez catheter in place, urine yellow and clear  EXTREMITIES: no peripheral edema, moving all extremities, peripheral pulses intact  NEURO: Cranial nerves II-XII grossly intact, no focal deficits noted  SKIN: No rashes, sores or ulcerations. Staples midline, no patricia-incisional erythema.  PSYCH:  Affect: anxious    GEN: awake alert, cooperative, but irritable  EYES: PERRL, Anicteric sclera.   HEENT:  Normocephalic, atraumatic, trachea midline  CV: RRR, no gallops, rubs, or murmurs  PULM/CHEST: coarse breath sounds bilaterally without crackles, symmetric chest rise, some wheeze  GI: quiet bowel sounds, distended, tender, + guarding  : vazquez catheter in place, urine yellow and clear  EXTREMITIES: ++ peripheral edema, moving all extremities, peripheral pulses intact  NEURO: Cranial nerves II-XII grossly intact, no focal deficits noted  SKIN: No rashes, sores or ulcerations. Staples midline, no patricia-incisional erythema.  PSYCH:  Affect: anxious  Makenna Cordova

## 2017-07-16 NOTE — PLAN OF CARE
Problem: Goal Outcome Summary  Goal: Goal Outcome Summary  Outcome: Improving  D/I: Pt extubated approx 0800 this am to 3L NC.  Pt with good cough, swallowing secretions.  Currently weaned down to room air, O2 sats 97%.  Lungs clr to coarse, nebs prn, diminish in base.  Pt mental status continues to be intermittent confusion, oriented to person/place/situation at times.  Restraints d/c'd with extubation, but pt continues to require freq reorientation and reminders not to pull at NG/NJ, other lines.  SICU team notified of continued need for restraints.  Pt's wife present this afternoon and updated.  Pt up to chair for short time mid-day, unable to remain sitting safely in chair.  Pt c/o generalized pain, R hip pain.  Using tylenol, oxycodone 5mg with some relief.  Spoke with wife, he had some R hip and back pain following txp surgery that had mostly resolved, may be aggravated by hosp course.  Also had hx of fibromyalgia pain per wife, which pt agrees with, as he has ongoing general pain. Tolerating TF, NG output approx 100cc/hr, team aware, abd xray reviewed, lines in place.  D5 started for high sodium, insulin gtt adjusted, recheck labs 1600.    A/P: Pt doing well following extubation.  Will continue to need encouragement to cough/deep breath.  Attempt to get up to chair as tolerated. Speech to eval swallow tomorrow, per team just use swabs for comfort today.  Monitor for changes, continue plan of care.

## 2017-07-16 NOTE — PROGRESS NOTES
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 The hospital course has been complicated by development of severe neutropenia (ANC <500),  thrombocytopenia, anemia now improved, pleural effusion, EJ , radial occlusion, intubation and CMV viremia.     Neurology was consulted due to altered mental status.               Overnight:    Patient was extubated this morning               Notes reviewed    Nurses: Pt extubated this morning at 0800.  Restraints d/c'd at that time to trial improving mental status and ability to tolerate NG/FT, other lines without pulling  Infectious disease:  consider starting Tigecycline (loading dose is 100mg x 1 then start 50mg QD)  to cover potential VRE while cultures from ascites incubate - as he is colonized with this and to this point we have not covered it. continue ertapenem for now. CMV DNA PCR counts are high                    Medications:      Current Facility-Administered Medications   Medication     oxyCODONE (ROXICODONE) IR tablet 5-10 mg     HYDROmorphone (PF) (DILAUDID) injection 0.3-0.5 mg     dextrose 5% infusion     cloNIDine 0.1 mg/mL (CATAPRES) solution 0.2 mg     hydrALAZINE (APRESOLINE) injection 20 mg     tacrolimus (PROGRAF - GENERIC EQUIVALENT) suspension 6 mg     amLODIPine (NORVASC) suspension 5 mg     0.9% sodium chloride infusion     valGANciclovir (VALCYTE) tablet 450 mg     senna-docusate (SENOKOT-S;PERICOLACE) 8.6-50 MG per tablet 2 tablet     polyethylene glycol (MIRALAX/GLYCOLAX) Packet 17 g     midazolam (VERSED) injection 1-2 mg     ertapenem (INVanz) 1 g vial to attach to  mL bag     multivitamins with minerals (CERTAVITE/CEROVITE) liquid 15 mL     heparin sodium PF injection 5,000 Units     aspirin chewable  tablet 81 mg     lidocaine (viscous) (XYLOCAINE) 2 % solution 5 mL     dextrose 10 % 1,000 mL infusion     pantoprazole (PROTONIX) suspension 40 mg     insulin 1 unit/mL in saline (NovoLIN, HumuLIN Regular) drip - ADULT IV Infusion     glucose 40 % gel 15-30 g    Or     dextrose 50 % injection 25-50 mL    Or     glucagon injection 1 mg     fludrocortisone (FLORINEF) tablet 0.1 mg     lidocaine 1 % 1 mL     lidocaine (LMX4) kit     sodium chloride (PF) 0.9% PF flush 3 mL     sodium chloride (PF) 0.9% PF flush 3 mL     ondansetron (ZOFRAN-ODT) ODT tab 4 mg    Or     ondansetron (ZOFRAN) injection 4 mg     bisacodyl (DULCOLAX) Suppository 10 mg     naloxone (NARCAN) injection 0.1-0.4 mg     acetaminophen (TYLENOL) tablet 650 mg    Or     acetaminophen (TYLENOL) Suppository 650 mg     potassium chloride SA (K-DUR/KLOR-CON M) CR tablet 20-40 mEq     potassium chloride (KLOR-CON) Packet 20-40 mEq     potassium chloride 10 mEq in 100 mL intermittent infusion     potassium chloride 10 mEq in 100 mL intermittent infusion with 10 mg lidocaine     potassium chloride 20 mEq in 50 mL intermittent infusion     sodium phosphate 10 mmol in D5W intermittent infusion     sodium phosphate 15 mmol in D5W intermittent infusion     sodium phosphate 20 mmol in D5W intermittent infusion     sodium phosphate 25 mmol in D5W intermittent infusion     magnesium sulfate 2 g in NS intermittent infusion (PharMEDium or FV Cmpd)     magnesium sulfate 4 g in 100 mL sterile water (premade)           Exam      /79 (BP Location: Left arm)  Pulse 90  Temp 99.8  F (37.7  C) (Oral)  Resp 22  Ht 1.829 m (6')  Wt 93.5 kg (206 lb 2.1 oz)  SpO2 97%  BMI 27.96 kg/m2    Constitutional : well-built, laying in bed  Head: atraumatic, anicteric. See neuroexam  Eyes: see neuroexam  CVC: sinus on monitor  LUng: on room air without distress  extremities: purple finger on R hand and purple toes on R foot. Seems to have pain on arm and leg.    Neuroexam  Alert. Follow some commands, but also refuse others.   His answers are not consistent with questions.   Eyes tracks and make eye contact.   Face symmetric  Eyes: motility preserved on horizontal axis, no nystagmus  Tongue centered  No tremors  Patient does flex his arms over gravity as moves bilateral wrist over gravity. He refuses to extend or abduct his arms. He refers that he cannot.  Movement of his legs does have some movement over gravity, but he does not want to continue exam.   Reflexes are preserved on arms               Data:      Lab Results   Component Value Date    WBC 5.4 07/16/2017     Lab Results   Component Value Date    RBC 2.69 07/16/2017     Lab Results   Component Value Date    HGB 7.4 07/16/2017     Lab Results   Component Value Date    HCT 24.4 07/16/2017     No components found for: MCT  Lab Results   Component Value Date    MCV 91 07/16/2017     Lab Results   Component Value Date    MCH 27.5 07/16/2017     Lab Results   Component Value Date    MCHC 30.3 07/16/2017     Lab Results   Component Value Date    RDW 14.7 07/16/2017     Lab Results   Component Value Date     07/16/2017     Last Basic Metabolic Panel:  Lab Results   Component Value Date     07/16/2017      Lab Results   Component Value Date    POTASSIUM 5.1 07/16/2017     Lab Results   Component Value Date    CHLORIDE 123 07/16/2017     Lab Results   Component Value Date    JACOB 7.8 07/16/2017     Lab Results   Component Value Date    CO2 22 07/16/2017     Lab Results   Component Value Date    BUN 71 07/16/2017     Lab Results   Component Value Date    CR 1.66 07/16/2017     Lab Results   Component Value Date     07/16/2017     Results for RONNIE ARIZMENDI (MRN [id number]) as of 7/16/2017 16:38   Ref. Range 6/26/2017 10:38 7/3/2017 10:32 7/10/2017 03:40 7/11/2017 03:28 7/15/2017 15:53   CMV Quant IU/mL Latest Ref Range: CMVND [IU]/mL CMV DNA Not Detec... <137... 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 Although patient with multiple infections, this needs to be further investigate. Encephalopathy + proximal weakness could be consistent with watershed infarcts.   Neurology suggests:   - CT Brain       Neurology will keep following        Attestation:  Patient discussed over the phone with Dr. Leelee Sands  PGY4 NEurology  7427

## 2017-07-16 NOTE — PROGRESS NOTES
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     Graft Function: Good function. US liver normal doppler. TB/ALT/AST WNL, Alk phos elevated. Ggt normal.     Immunosuppression Management:   CellCept 250 mg BID. Held since 6/27/17 due to neutropenia. Continue to hold.   Tac goal level 5-8. Since MMF held will aim for goal ~8. Prograf 5 mg BID. Level 7.6, 10 hr trough.     Cardiorespiratory: Intubated for ex lap, initially requiring pressor support. HDS. Moderate right sided pleural effusion. Chest tube placed 7/9 that was removed 7/12. Vent management per SICU team. Continuing PS trials, neuro status limiting. All sedating medications held.  Extubated this morning.    Hematology: Pancytopenia, acute on chronic, secondary to medications. MMF and bactrim held (since 6/27). Valcyte held on admission. Neupogen 480 mcg given on 7/4 to 7/7,  CMV seronegative and donor seropositive. 7/3 CMV PCR < 137. 7/10 CMV . Continue to hold MMF, bactrim and valcyte.     GI: CT from [place] demonstrating right sided colonic inflammation, AXR on admission with dilated loop of bowel in central abdomen, felt likely to sigmoid. Initial impression consistent with Typhlitis however given worsening abdominal pain on examination, hypotension and increasing lactate levels, elected to proceed with exploratory laparotomy. Findings on reexploration demonstrating persistent inflammation of the right colon without evidence of ischemia. Lactic acid normalized.  NJ placed TF Peptamen titrated to goal. +BMs.   Once more awake, will obtain swallow  test.    Fluid/Electrolytes/Renal:  EJ, secondary to hypoperfusion, sepsis. Cr 1.7<-1.8<-2.0 (2.1). BUN trending down. Good UO. HypoNa, resolved. Nephrology consulting. Hyperkalemia, PTA on florinef. K stable.     Endocrine: DM II, on insulin gtt.     Infectious disease: Started on Zosyn, Vancomycin, Flagyl, Micafungin on admission (7/4/2017). 7/5 Blood cultures, no growth thus far. Abdominal fluid culture collected intraoperatively, gram stain with no organisms, fungal and bacterial culture, GPCs. Bronchoalveolar lavage growing VRE, colonization. Tx ID consulted.  -Fever > 101 for the past 3 days, WBC 7.7, decreasing. Pan cx (urine, blood, sputum) negative thus far. Continue ertapenem. ID consulted. Exhchanged lines. CT scan w/o contrast to eval for abscess WNL.    -CMV viremia - CMV D+R-, low viral count. IS reduced (MMF held). Monitor for now, weekly CMV PCR. Per ID will plan for diagnostic paracentesis today. Possible VRE peritonitis vs fungal infection. Also possible drug fever.     Neuro: Delirium, hold, limit medications that have CNS SE. Oxycodone PRN pain. Neurology consulted- dx likely toxic metabolic encephalopathy, improving slowly.    Access: R IJ  Vascular: Right Arterial line clot 2/2 previous art line. Vascular consulted. Recommend ASA only. Some evidence of microvascular injury in digits (toes) this is most likely due to injury while patient was on pressor therapy and sepsis.     Prophylaxis: PPI, DVT-SCD  Disposition: SICU    Medical Decision Making: Medium  Admit 21056 (moderate level decision making)    PILLO/Fellow/Resident Provider: Tay  Faculty: Chaparro Anderson M.D.    __________________________________________________________________  Transplant History:.  3/4/2017 DD OLT with Dr. Tran (Liver), Postoperative day: 134     Interval History: History is obtained from EMR and nursing staff.  Overnight events: Extubated; continues to spike high fevers.    ROS:   A 10-point review of  systems was negative except as noted above.    Meds:    cloNIDine  0.2 mg Oral BID     tacrolimus  6 mg Oral BID IS     amLODIPine  5 mg Oral Daily     valGANciclovir  450 mg Oral Daily     ertapenem (INVanz) IV  1 g Intravenous Q24H     multivitamins with minerals  15 mL Per Feeding Tube Daily     heparin  5,000 Units Subcutaneous Q8H     aspirin  81 mg Oral Daily     lidocaine (viscous)  5 mL Topical Once     pantoprazole  40 mg Oral or Feeding Tube Daily     fludrocortisone  0.1 mg Oral Daily     sodium chloride (PF)  3 mL Intracatheter Q8H       Physical Exam:     Admit Weight: 94.2 kg (207 lb 11.2 oz) (SCALE 2)    Current vitals:   /87 (BP Location: Left arm)  Pulse 90  Temp 99.6  F (37.6  C) (Axillary)  Resp 28  Ht 1.829 m (6')  Wt 93.5 kg (206 lb 2.1 oz)  SpO2 99%  BMI 27.96 kg/m2    Vital sign ranges:    Temp:  [97.8  F (36.6  C)-103.3  F (39.6  C)] 99.6  F (37.6  C)  Heart Rate:  [] 91  Resp:  [20-30] 28  BP: (122-185)/() 163/87  FiO2 (%):  [30 %-50 %] 50 %  SpO2:  [97 %-100 %] 99 %  Patient Vitals for the past 24 hrs:   BP Temp Temp src Heart Rate Resp SpO2 Weight   07/16/17 0822 - - - 91 28 99 % -   07/16/17 0800 163/87 99.6  F (37.6  C) Axillary 92 28 98 % -   07/16/17 0700 153/80 - - 92 - 100 % -   07/16/17 0600 144/76 - - 90 - 100 % 93.5 kg (206 lb 2.1 oz)   07/16/17 0500 137/74 - - 90 - 100 % -   07/16/17 0400 145/76 98.9  F (37.2  C) Axillary 91 28 100 % -   07/16/17 0300 144/77 - - 93 - 100 % -   07/16/17 0200 135/76 - - 90 - 100 % -   07/16/17 0100 155/85 - - 88 - 100 % -   07/16/17 0000 122/71 97.8  F (36.6  C) Axillary 86 25 100 % -   07/15/17 2300 148/82 - - 97 - 100 % -   07/15/17 2200 132/80 98.7  F (37.1  C) Axillary 92 - 100 % -   07/15/17 2100 122/68 - - 96 - 100 % -   07/15/17 2000 138/90 103.3  F (39.6  C) Axillary 102 27 99 % -   07/15/17 1900 160/86 - - 105 - 98 % -   07/15/17 1800 163/89 - - 101 29 100 % -   07/15/17 1700 (!) 185/94 - - 105 30 100 % -    07/15/17 1600 166/89 98.9  F (37.2  C) Axillary 101 27 100 % -   07/15/17 1500 158/82 - - 104 25 97 % -   07/15/17 1400 137/84 - - 95 20 98 % -   07/15/17 1300 (!) 143/97 - - 98 30 98 % -   07/15/17 1243 (!) 155/92 - - 101 - - -   07/15/17 1200 (!) 155/92 99.8  F (37.7  C) Axillary 98 25 99 % 92.9 kg (204 lb 12.9 oz)   07/15/17 1100 144/80 100.3  F (37.9  C) Axillary 97 27 98 % -   07/15/17 1000 (!) 164/106 100.1  F (37.8  C) Axillary 99 29 99 % -     General Appearance: NAD  Skin: normal, warm, dry  Heart: NSR  Lungs: Vented  Abdomen: The abdomen is soft, midline incision is c/d/i and covered. Chevron incision well healed.   : vazquez is present, yellow urine in bag.   Extremities:BLE trace edema.  Neurologic: moving extremities, following commands.     Data:   CMP    Recent Labs  Lab 07/16/17  0326 07/15/17  0627 07/14/17  1517 07/14/17  0349  07/13/17  0344   * 148*  --  144  < > 149*   POTASSIUM 5.1 4.8  --  4.4  < > 3.9   CHLORIDE 123* 120*  --  116*  < > 120*   CO2 22 21  --  20  < > 20   * 200*  --  187*  < > 99   BUN 71* 73*  --  84*  < > 99*   CR 1.66* 1.56*  --  1.74*  < > 1.91*   GFRESTIMATED 44* 47*  --  41*  < > 37*   GFRESTBLACK 53* 57*  --  50*  < > 45*   JACOB 7.8* 8.0*  --  8.0*  < > 7.8*   MAG 2.4*  --   --  2.2  < > 2.3   PHOS 3.8  --   --  3.7  < > 4.0   ALBUMIN  --   --   --  2.0*  --  1.8*   BILITOTAL  --   --   --  0.5  --  0.4   ALKPHOS  --   --   --  171*  --  146   AST  --   --   --  68*  --  44   ALT  --   --   --  36  --  26   FAMY  --   --  6  --   --   --    < > = values in this interval not displayed.  CBC    Recent Labs  Lab 07/16/17  0326 07/15/17  0627   HGB 7.4* 7.9*   WBC 5.4 5.2   * 141*     COAGS    Recent Labs  Lab 07/10/17  0340   INR 1.17*      Urinalysis  Recent Labs   Lab Test  07/11/17   1105  07/06/17   1441  06/14/17   1508   04/11/16   1345   COLOR  Yellow  Yellow   --    < >  Yellow   APPEARANCE  Clear  Cloudy   --    < >  Clear   URINEGLC  Negative   Negative   --    < >  30*   URINEBILI  Negative  Negative   --    < >  Negative   URINEKETONE  Negative  Negative   --    < >  Negative   SG  1.009  1.014   --    < >  1.016   UBLD  Negative  Moderate*   --    < >  Small*   URINEPH  5.0  5.0   --    < >  5.0   PROTEIN  10*  30*   --    < >  30*   NITRITE  Negative  Negative   --    < >  Negative   LEUKEST  Negative  Trace*   --    < >  Negative   RBCU  <1  >182*   --    < >  1   WBCU  <1  9*   --    < >  1   UTPG   --    --   1.55*   --   0.41*    < > = values in this interval not displayed.       Cultures:   Blood Cultures x2 7/4/2017 NGTD     Abdominal Fluid Culture 7/4/2017: NGTD    Abdominal Fluid Culture 7/5/17: NGTD    Bronchoalveolar Culture 7/5/17: VRE.     Sputum endotracheal gm stain/culture 7/11/17: >25 PMNs/low power field   Few Mixed gram positive elvie    CT scan:    7/4/2017 CONCLUSION:   1. Right colonic wall thickening suggesting colitis. Follow-up is necessary to confirm resolution in order to exclude colonic mass.  2. Transverse colon is not well distended reducing evaluation for wall thickening.  3. Small amount of ascites.  4. Splenomegaly.  5. Prominent portal and splenic veins. If confirmation of vascular patency is indicated consider follow-up ultrasound of the liver with Doppler.  6. Small right pleural effusion.  7. Stranding in the subcutaneous fat of the ventral pelvis.     Abdominal XR 7/4/2017:   1. No evidence of pneumoperitoneum.  2. Dilated loop of bowel in the central abdomen, likely sigmoid.    XR Chest Portable 1 View 7/4/17:  1. Endotracheal tube tip projects 5.3 cm from the chacorta.  2. Increased bilateral pleural effusions, right greater than left.  3. Unchanged bibasilar patchy opacities.    Attestation:    The patient has been seen and evaluated by me.   Vital signs, labs, medications and orders were reviewed.   When obtained, diagnostic images were reviewed by me and interpreted as above.    The care plan was discussed with  the multidisciplinary team and I agree with the findings and plan in this note, with any differences recorded in blue.    Immunosuppressive medication management was reviewed and adjusted as reflected in the note and orders.     .

## 2017-07-16 NOTE — PLAN OF CARE
Problem: Goal Outcome Summary  Goal: Goal Outcome Summary  SLP: Orders received for bedside swallow evaluation.  Pt extubated this AM with NG to suction.  Upon discussion with RN, pt confused and further not appropriate for participation.  ST to f/u tomorrow.

## 2017-07-16 NOTE — PLAN OF CARE
Problem: Goal Outcome Summary  Goal: Goal Outcome Summary  Outcome: Improving  Liver transplant pt admitted with neutropenic colitis. Alert, follows commands intermittently, PERRL, Moving all extremities spontaneously but BLE weaker than UE. C/o generalized pain. Tylenol and oxycodone given with optimal relief. Spiked a fever 103.3F. Tylenol given and ice pack utilized. Recheck temp 98.7F. Blood cultures done.  BP stable. NSR on cardiac monitor. Vented on 50% FiO2. Sating upper 90's. LS clear to coarse and diminished on the bases. NG to LIS. NJ with tube feeds at goal rate of 80 cc/hr with 100 cc q1h flushes. On insulin drip Algorithm #2 currently on 2 units/hr. Rectal tube with no output overnight.  Crowe with good UOP. Abdominal incision is CDI with staples in place. Multiple black and red fingers and toes, tender to touch.  Plan: see flow sheet for detailed assessments and interventions, continue to support POC.

## 2017-07-17 ENCOUNTER — APPOINTMENT (OUTPATIENT)
Dept: SPEECH THERAPY | Facility: CLINIC | Age: 52
End: 2017-07-17
Attending: NURSE PRACTITIONER
Payer: COMMERCIAL

## 2017-07-17 ENCOUNTER — ALLIED HEALTH/NURSE VISIT (OUTPATIENT)
Dept: NEUROLOGY | Facility: CLINIC | Age: 52
End: 2017-07-17
Attending: PSYCHIATRY & NEUROLOGY
Payer: COMMERCIAL

## 2017-07-17 ENCOUNTER — APPOINTMENT (OUTPATIENT)
Dept: OCCUPATIONAL THERAPY | Facility: CLINIC | Age: 52
End: 2017-07-17
Attending: TRANSPLANT SURGERY
Payer: COMMERCIAL

## 2017-07-17 DIAGNOSIS — G93.40 ENCEPHALOPATHY: Primary | ICD-10-CM

## 2017-07-17 LAB
ACID FAST STN SPEC QL: NORMAL
ALBUMIN SERPL-MCNC: 1.9 G/DL (ref 3.4–5)
ALP SERPL-CCNC: 144 U/L (ref 40–150)
ALT SERPL W P-5'-P-CCNC: 58 U/L (ref 0–70)
ANION GAP SERPL CALCULATED.3IONS-SCNC: 7 MMOL/L (ref 3–14)
AST SERPL W P-5'-P-CCNC: 101 U/L (ref 0–45)
BACTERIA SPEC CULT: NO GROWTH
BACTERIA SPEC CULT: NO GROWTH
BASOPHILS # BLD AUTO: 0 10E9/L (ref 0–0.2)
BASOPHILS NFR BLD AUTO: 0.3 %
BILIRUB SERPL-MCNC: 1 MG/DL (ref 0.2–1.3)
BUN SERPL-MCNC: 66 MG/DL (ref 7–30)
CALCIUM SERPL-MCNC: 7.8 MG/DL (ref 8.5–10.1)
CHLORIDE SERPL-SCNC: 114 MMOL/L (ref 94–109)
CO2 SERPL-SCNC: 21 MMOL/L (ref 20–32)
CREAT SERPL-MCNC: 1.55 MG/DL (ref 0.66–1.25)
DIFFERENTIAL METHOD BLD: ABNORMAL
EOSINOPHIL # BLD AUTO: 0.1 10E9/L (ref 0–0.7)
EOSINOPHIL NFR BLD AUTO: 1.9 %
ERYTHROCYTE [DISTWIDTH] IN BLOOD BY AUTOMATED COUNT: 14.7 % (ref 10–15)
GFR SERPL CREATININE-BSD FRML MDRD: 47 ML/MIN/1.7M2
GLUCOSE BLDC GLUCOMTR-MCNC: 112 MG/DL (ref 70–99)
GLUCOSE BLDC GLUCOMTR-MCNC: 117 MG/DL (ref 70–99)
GLUCOSE BLDC GLUCOMTR-MCNC: 124 MG/DL (ref 70–99)
GLUCOSE BLDC GLUCOMTR-MCNC: 128 MG/DL (ref 70–99)
GLUCOSE BLDC GLUCOMTR-MCNC: 140 MG/DL (ref 70–99)
GLUCOSE BLDC GLUCOMTR-MCNC: 144 MG/DL (ref 70–99)
GLUCOSE BLDC GLUCOMTR-MCNC: 147 MG/DL (ref 70–99)
GLUCOSE BLDC GLUCOMTR-MCNC: 150 MG/DL (ref 70–99)
GLUCOSE BLDC GLUCOMTR-MCNC: 154 MG/DL (ref 70–99)
GLUCOSE BLDC GLUCOMTR-MCNC: 158 MG/DL (ref 70–99)
GLUCOSE BLDC GLUCOMTR-MCNC: 166 MG/DL (ref 70–99)
GLUCOSE BLDC GLUCOMTR-MCNC: 166 MG/DL (ref 70–99)
GLUCOSE BLDC GLUCOMTR-MCNC: 169 MG/DL (ref 70–99)
GLUCOSE BLDC GLUCOMTR-MCNC: 172 MG/DL (ref 70–99)
GLUCOSE BLDC GLUCOMTR-MCNC: 173 MG/DL (ref 70–99)
GLUCOSE BLDC GLUCOMTR-MCNC: 192 MG/DL (ref 70–99)
GLUCOSE BLDC GLUCOMTR-MCNC: 203 MG/DL (ref 70–99)
GLUCOSE SERPL-MCNC: 239 MG/DL (ref 70–99)
HCT VFR BLD AUTO: 24.4 % (ref 40–53)
HGB BLD-MCNC: 7.7 G/DL (ref 13.3–17.7)
IMM GRANULOCYTES # BLD: 0 10E9/L (ref 0–0.4)
IMM GRANULOCYTES NFR BLD: 0.3 %
INR PPP: 1.19 (ref 0.86–1.14)
LYMPHOCYTES # BLD AUTO: 1.5 10E9/L (ref 0.8–5.3)
LYMPHOCYTES NFR BLD AUTO: 24.9 %
MCH RBC QN AUTO: 28.1 PG (ref 26.5–33)
MCHC RBC AUTO-ENTMCNC: 31.6 G/DL (ref 31.5–36.5)
MCV RBC AUTO: 89 FL (ref 78–100)
MICRO REPORT STATUS: NORMAL
MONOCYTES # BLD AUTO: 0.2 10E9/L (ref 0–1.3)
MONOCYTES NFR BLD AUTO: 3.2 %
NEUTROPHILS # BLD AUTO: 4.1 10E9/L (ref 1.6–8.3)
NEUTROPHILS NFR BLD AUTO: 69.4 %
NRBC # BLD AUTO: 0 10*3/UL
NRBC BLD AUTO-RTO: 0 /100
PLATELET # BLD AUTO: 160 10E9/L (ref 150–450)
POTASSIUM SERPL-SCNC: 5.1 MMOL/L (ref 3.4–5.3)
PREALB SERPL IA-MCNC: 8 MG/DL (ref 15–45)
PROT SERPL-MCNC: 5.5 G/DL (ref 6.8–8.8)
RBC # BLD AUTO: 2.74 10E12/L (ref 4.4–5.9)
SODIUM SERPL-SCNC: 142 MMOL/L (ref 133–144)
SPECIMEN SOURCE: NORMAL
TACROLIMUS BLD-MCNC: 5.1 UG/L (ref 5–15)
TME LAST DOSE: NORMAL H
TRIGL SERPL-MCNC: 168 MG/DL
WBC # BLD AUTO: 5.9 10E9/L (ref 4–11)

## 2017-07-17 PROCEDURE — 80053 COMPREHEN METABOLIC PANEL: CPT | Performed by: TRANSPLANT SURGERY

## 2017-07-17 PROCEDURE — 87496 CYTOMEG DNA AMP PROBE: CPT | Performed by: TRANSPLANT SURGERY

## 2017-07-17 PROCEDURE — 25000125 ZZHC RX 250: Performed by: PHYSICIAN ASSISTANT

## 2017-07-17 PROCEDURE — 85610 PROTHROMBIN TIME: CPT | Performed by: TRANSPLANT SURGERY

## 2017-07-17 PROCEDURE — 25000131 ZZH RX MED GY IP 250 OP 636 PS 637: Performed by: STUDENT IN AN ORGANIZED HEALTH CARE EDUCATION/TRAINING PROGRAM

## 2017-07-17 PROCEDURE — 84134 ASSAY OF PREALBUMIN: CPT | Performed by: TRANSPLANT SURGERY

## 2017-07-17 PROCEDURE — 85025 COMPLETE CBC W/AUTO DIFF WBC: CPT | Performed by: TRANSPLANT SURGERY

## 2017-07-17 PROCEDURE — 40000133 ZZH STATISTIC OT WARD VISIT: Performed by: OCCUPATIONAL THERAPIST

## 2017-07-17 PROCEDURE — 25000128 H RX IP 250 OP 636: Performed by: STUDENT IN AN ORGANIZED HEALTH CARE EDUCATION/TRAINING PROGRAM

## 2017-07-17 PROCEDURE — 25000132 ZZH RX MED GY IP 250 OP 250 PS 637: Performed by: STUDENT IN AN ORGANIZED HEALTH CARE EDUCATION/TRAINING PROGRAM

## 2017-07-17 PROCEDURE — 25000128 H RX IP 250 OP 636: Performed by: NURSE PRACTITIONER

## 2017-07-17 PROCEDURE — 27210437 ZZH NUTRITION PRODUCT SEMIELEM INTERMED LITER

## 2017-07-17 PROCEDURE — 00000146 ZZHCL STATISTIC GLUCOSE BY METER IP

## 2017-07-17 PROCEDURE — 80197 ASSAY OF TACROLIMUS: CPT | Performed by: TRANSPLANT SURGERY

## 2017-07-17 PROCEDURE — 80048 BASIC METABOLIC PNL TOTAL CA: CPT | Performed by: TRANSPLANT SURGERY

## 2017-07-17 PROCEDURE — 25000131 ZZH RX MED GY IP 250 OP 636 PS 637: Performed by: PHYSICIAN ASSISTANT

## 2017-07-17 PROCEDURE — 40000225 ZZH STATISTIC SLP WARD VISIT

## 2017-07-17 PROCEDURE — 25000132 ZZH RX MED GY IP 250 OP 250 PS 637: Performed by: NURSE PRACTITIONER

## 2017-07-17 PROCEDURE — 95951 ZZHC EEG VIDEO < 12 HR: CPT | Mod: 52,ZF

## 2017-07-17 PROCEDURE — 84478 ASSAY OF TRIGLYCERIDES: CPT | Performed by: TRANSPLANT SURGERY

## 2017-07-17 PROCEDURE — 99233 SBSQ HOSP IP/OBS HIGH 50: CPT | Performed by: NURSE PRACTITIONER

## 2017-07-17 PROCEDURE — 97530 THERAPEUTIC ACTIVITIES: CPT | Mod: GO | Performed by: OCCUPATIONAL THERAPIST

## 2017-07-17 PROCEDURE — 25000132 ZZH RX MED GY IP 250 OP 250 PS 637: Performed by: SURGERY

## 2017-07-17 PROCEDURE — 25000132 ZZH RX MED GY IP 250 OP 250 PS 637

## 2017-07-17 PROCEDURE — 12000024 ZZH R&B TRANSPLANT CRITICAL

## 2017-07-17 PROCEDURE — 92610 EVALUATE SWALLOWING FUNCTION: CPT | Mod: GN

## 2017-07-17 RX ADMIN — ACETAMINOPHEN 650 MG: 325 TABLET, FILM COATED ORAL at 01:54

## 2017-07-17 RX ADMIN — VALGANCICLOVIR 450 MG: 450 TABLET, FILM COATED ORAL at 08:59

## 2017-07-17 RX ADMIN — TACROLIMUS 6 MG: 5 CAPSULE ORAL at 09:01

## 2017-07-17 RX ADMIN — PANTOPRAZOLE SODIUM 40 MG: 40 TABLET, DELAYED RELEASE ORAL at 09:00

## 2017-07-17 RX ADMIN — HEPARIN SODIUM 5000 UNITS: 5000 INJECTION, SOLUTION INTRAVENOUS; SUBCUTANEOUS at 11:56

## 2017-07-17 RX ADMIN — MULTIVIT AND MINERALS-FERROUS GLUCONATE 9 MG IRON/15 ML ORAL LIQUID 15 ML: at 09:00

## 2017-07-17 RX ADMIN — HUMAN INSULIN 4 UNITS/HR: 100 INJECTION, SOLUTION SUBCUTANEOUS at 12:03

## 2017-07-17 RX ADMIN — HEPARIN SODIUM 5000 UNITS: 5000 INJECTION, SOLUTION INTRAVENOUS; SUBCUTANEOUS at 04:16

## 2017-07-17 RX ADMIN — OXYCODONE HYDROCHLORIDE 5 MG: 5 TABLET ORAL at 01:54

## 2017-07-17 RX ADMIN — Medication 0.2 MG: at 20:08

## 2017-07-17 RX ADMIN — HUMAN INSULIN 4 UNITS/HR: 100 INJECTION, SOLUTION SUBCUTANEOUS at 04:35

## 2017-07-17 RX ADMIN — ASPIRIN 81 MG CHEWABLE TABLET 81 MG: 81 TABLET CHEWABLE at 09:00

## 2017-07-17 RX ADMIN — ACETAMINOPHEN 650 MG: 325 TABLET, FILM COATED ORAL at 21:38

## 2017-07-17 RX ADMIN — DEXTROSE MONOHYDRATE: 50 INJECTION, SOLUTION INTRAVENOUS at 13:01

## 2017-07-17 RX ADMIN — TACROLIMUS 7 MG: 5 CAPSULE ORAL at 18:39

## 2017-07-17 RX ADMIN — HEPARIN SODIUM 5000 UNITS: 5000 INJECTION, SOLUTION INTRAVENOUS; SUBCUTANEOUS at 20:08

## 2017-07-17 RX ADMIN — DEXTROSE MONOHYDRATE: 50 INJECTION, SOLUTION INTRAVENOUS at 06:44

## 2017-07-17 RX ADMIN — FLUDROCORTISONE ACETATE 0.1 MG: 0.1 TABLET ORAL at 09:00

## 2017-07-17 RX ADMIN — Medication 0.2 MG: at 09:58

## 2017-07-17 RX ADMIN — OXYCODONE HYDROCHLORIDE 10 MG: 5 TABLET ORAL at 09:15

## 2017-07-17 RX ADMIN — ERTAPENEM SODIUM 1 G: 1 INJECTION, POWDER, LYOPHILIZED, FOR SOLUTION INTRAMUSCULAR; INTRAVENOUS at 11:16

## 2017-07-17 RX ADMIN — OXYCODONE HYDROCHLORIDE 5 MG: 5 TABLET ORAL at 17:27

## 2017-07-17 RX ADMIN — AMLODIPINE BESYLATE 5 MG: 10 TABLET ORAL at 09:01

## 2017-07-17 ASSESSMENT — PAIN DESCRIPTION - DESCRIPTORS: DESCRIPTORS: ACHING

## 2017-07-17 NOTE — MR AVS SNAPSHOT
After Visit Summary   7/17/2017    Camacho Bhagat    MRN: [id number]           Patient Information     Date Of Birth          1965        Visit Information        Provider Department      7/17/2017 12:00 PM [facility] EEG TECH 4 [facility]        Today's Diagnoses     Encephalopathy    -  1       Follow-ups after your visit        Your next 10 appointments already scheduled     Jul 18, 2017  7:00 AM CDT   24 Hour Video Visit with [facility] EEG TECH 4   [facility] ([facility])    [facility]  [address] [facility] [id number]   [phone]           [place]al[place]: Your appointment is scheduled at [facility]. [address] [place] [id number]            Jul 28, 2017 11:15 AM CDT   (Arrive by 11:00 AM)   Return General Liver with Toñito Camejo MD   [facility] Hepatology ([facility])    [address] [place] [id number] [phone]            Sep 20, 2017  3:00 PM CDT   (Arrive by 2:30 PM)   New Patient Visit with Tl Higginbotham MD   [facility] Nephrology ([facility])    [address] [place] [id number] [phone]              Who to contact     Please call your clinic at [phone] to:    Ask questions about your health    Make or cancel appointments    Discuss your medicines    Learn about your test results    Speak to your doctor   If you have compliments or concerns about an experience at your clinic, or if you wish to file a complaint, please contact [facility] Patient Relations at [phone] or email us at [email or web address]         Additional Information About Your Visit        MyChart Information     [employer] gives you secure access to your electronic health record. If you see a primary care provider, you can also send messages to your care team and make appointments. If you have  questions, please call your primary care clinic.  If you do not have a primary care provider, please call [phone] and they will assist you.      [employer] is an electronic gateway that provides easy, online access to your medical records. With [employer], you can request a clinic appointment, read your test results, renew a prescription or communicate with your care team.     To access your existing account, please contact your [facility] or call [phone] for assistance.        Care EveryWhere ID     This is your Care EveryWhere ID. This could be used by other organizations to access your [place] medical records  [id number]         Blood Pressure from Last 3 Encounters:   07/17/17 128/72   06/12/17 (!) 175/92   06/07/17 (!) 199/103    Weight from Last 3 Encounters:   07/17/17 93.2 kg (205 lb 7.5 oz)   06/07/17 98 kg (216 lb)   05/11/17 101.7 kg (224 lb 4.8 oz)              We Performed the Following     Glucose by meter          Today's Medication Changes      Notice     This visit is during an admission. Changes to the med list made in this visit will be reflected in the After Visit Summary of the admission.             Primary Care Provider Office Phone # Fax #    Shay Kirkpatrick [phone]       [address]  [facility] [address]        Equal Access to Services     FRANKLIN PORTILLO : Hadii tavo swartz hadasho Sojose, waaxda luqadaha, qaybta kaalmada adejeradda, devi duckworth. So [facility] [phone].    ATENCIÓN: Si habla español, tiene a zheng disposición servicios gratuitos de asistencia lingüística. Llame al [phone].    We comply with applicable federal civil rights laws and [place] laws. We do not discriminate on the basis of race, color, national origin, age, disability sex, sexual orientation or gender identity.            Thank you!     Thank you for choosing [facility]  for your care. Our goal is always to  provide you with excellent care. Hearing back from our patients is one way we can continue to improve our services. Please take a few minutes to complete the written survey that you may receive in the mail after your visit with us. Thank you!             Your Updated Medication List - Protect others around you: Learn how to safely use, store and throw away your medicines at www.disposemymeds.org.      Notice     This visit is during an admission. Changes to the med list made in this visit will be reflected in the After Visit Summary of the admission.

## 2017-07-17 NOTE — PROGRESS NOTES
07/17/17 0801   General Information   Onset Date 07/04/17   Start of Care Date 07/17/17   Referring Physician Makenna Cordova APRN CNP   Patient Profile Review/OT: Additional Occupational Profile Info See Profile for full history and prior level of function   Patient/Family Goals Statement Pt unable to state personal goal, but did demonstrate interest in eating/drinking   Swallowing Evaluation Bedside swallow evaluation   Behaviorial Observations Confused  (not consistently following commands)   Mode of current nutrition NPO   Respiratory Status O2 Supply   Type of O2 supply Nasal cannula   Comments Orders received for swallow evaluation. 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 The hospital course has been complicated by development of severe neutropenia (ANC <500),  thrombocytopenia, anemia now improved, pleural effusion, EJ , radial occlusion, intubation and CMV viremia. Extubated 7/16/15. NG to suction   Clinical Swallow Evaluation   Oral Musculature unable to assess due to poor participation/comprehension  (appears grossly intact on obs of speech/swallowing tasks)   Structural Abnormalities none present   Dentition present and adequate   Mucosal Quality dry;adequate   Laryngeal Function Cough;Throat clear;Swallow;Voicing initiated   Additional Documentation Yes   Swallow Eval   Feeding Assistance dependent  (bilateral mitts)   Clinical Swallow Eval: Thin Liquid Texture Trial   Mode of Presentation, Thin Liquids spoon;fed by clinician   Volume of Liquid or Food Presented ice chips x5; 1/2 tsp sips of water via spoon x4   Oral Phase of Swallow (reduced bolus awareness/control)   Pharyngeal Phase of Swallow impaired;throat clearing  (elevated aspiration risk)   Diagnostic Statement Pt demonstrated functional tolerance of ice chips; consistent s/sx of airway compromise  with small trials of thin liquids   Clinical Swallow Eval: Nectar Thick Liquid Texture Trial   Mode of Presentation, Nectar spoon;straw;self-fed   Volume of Nectar Presented 4oz   Oral Phase, Nectar (reduced oral awareness, but adequate)   Pharyngeal Phase, Nectar intact  (no overt s/sx of aspiration observed)   Diagnostic Statement swallow appears functional for nectar-thick liquids   Clinical Swallow Eval: Puree Solid Texture Trial   Mode of Presentation, Puree spoon;fed by clinician   Volume of Puree Presented tsp bites x3   Oral Phase, Puree WFL   Pharyngeal Phase, Puree intact  (no overt s/sx of aspiration observed)   Diagnostic Statement swallow appears functional for puree textures   Clinical Swallow Eval: Semisolid Texture Trial   Diagnostic Statement advanced textures not appropriate given presence of NG to suction   Swallow Compensations   Swallow Compensations Pacing;Reduce amounts;Alternate viscosity of consistencies   Esophageal Phase of Swallow   Patient reports or presents with symptoms of esophageal dysphagia No   General Therapy Interventions   Planned Therapy Interventions Dysphagia Treatment   Dysphagia treatment Modified diet education;Instruction of safe swallow strategies   Swallow Eval: Clinical Impressions   Skilled Criteria for Therapy Intervention Skilled criteria met.  Treatment indicated.   Functional Assessment Scale (FAS) 5   Treatment Diagnosis mild dysphagia   Diet texture recommendations Nectar thick liquids;Full liquid   Recommended Feeding/Eating Techniques alternate between small bites and sips of food/liquid;maintain upright posture during/after eating for 30 mins;small sips/bites   Demonstrates Need for Referral to Another Service occupational therapy;physical therapy   Therapy Frequency 5 times/wk   Predicted Duration of Therapy Intervention (days/wks) 2 weeks   Anticipated Discharge Disposition inpatient rehabilitation facility   Risks and Benefits of Treatment have been  explained. Yes   Patient, family and/or staff in agreement with Plan of Care Yes   Clinical Impression Comments Clinical swallow evaluation completed per MD order. Pt presents with mild oral-pharyngeal dysphagia in the presence of confusion and deconditioning post extubation. Overt s/sx of aspiration observed with small trials of thin liquids via spoon. Pt tolerated ice chips, nectar-thick liquids, and puree textures without s/sx of airway compromise, but mildly reduced oral awareness observed. Advanced PO trials not presented given presence of NG to suction. Recommend initiate nectar-thick full liquid diet as tolerated when awake/alert and sitting upright. Pt should have supervision/assist for all PO intake. Ice chips ok as tolerated. ST to follow for PO tolerance and to assist with diet advance as appropriate   Total Evaluation Time   Total Evaluation Time (Minutes) 17

## 2017-07-17 NOTE — PLAN OF CARE
Problem: Goal Outcome Summary  Goal: Goal Outcome Summary  OT/4A: Pt max A for rolling, dependent lift. Pt limited by strength and cognition     REC: TCU due to decreased ADL I and cognition

## 2017-07-17 NOTE — PROGRESS NOTES
Immunosuppression Note:    Camacho Bhagat is a 51 year old male who is seen today  for immunosuppression management     I, Jovan Tran MD, I have examined the patient with the resident/PA/Fellow, discussed and agree with the note and findings.  I have reviewed today's vital signs, medications, labs and imaging. I reviewed the immunosuppression medications and levels. I spoke to the patient/family and explained below clinical details and answered all the questions      Transplant Surgery  Inpatient Daily Progress Note  07/17/2017    Assessment & Plan: Caamcho Bhagat is a 53 yo M with a history of ESLD due to CASTAÑEDA s/p DD OLT 3/4/17 admitted 7/4/17 from [facility] ED for evaluation and management of sepsis secondary to colitis, taken to OR for initial exploratory laparotomy with findings of typhlitis in the right colon, wound left open with wound vac in place for reexploration and interval closure on 7/5/2017.     Graft Function: Good function. US liver normal doppler. TB/ALT/AST/Alk phos increased today, but all in normal range except AST. Tac dose increased.   Immunosuppression Management:   CellCept 250 mg BID. Held since 6/27/17 due to neutropenia. Continue to hold.   Tac goal level 5-8. Since MMF held will aim for goal ~8. Prograf 6 mg BID, increased yesterday AM. 7/17 Level 5.1, 12.5 hr trough. Titrating up dose over past few days, poor absorption.  Increase dose to 7 mg BID, check level daily.   Cardiorespiratory: Intubated for ex lap, initially requiring pressor support. HDS. Moderate right sided pleural effusion. Chest tube placed 7/9 that was removed 7/12. HDS. Stable on NC. Continue aggressive pulmonary toilet, OOB to chair as tolerated.   Hematology: Pancytopenia, acute on chronic, secondary to medications. MMF and bactrim held (since 6/27). Valcyte held on admission. Neupogen 480 mcg given on 7/4 to 7/7,  CMV seronegative and donor seropositive. 7/3 CMV PCR < 137. 7/10 CMV  7/15 CMV PCR 4220  copies. Valcyte ppx dose restarted. Continue to hold MMF and bactrim.   GI: Neutropenic colitis. Possible CMV colitis. ID recommending possible colonoscopy, will wait for CMV PCR in process. NJ placed. TF Peptamen titrated at goal. Loose stools. C diff negative.  Remove NGT today, low output. If stable with no nausea will start diet tomorrow. SLP following.    Fluid/Electrolytes/Renal:  EJ, secondary to hypoperfusion, sepsis. Cr 1.5 (1.7). BUN trending down. Good UO. HypoNa, resolved. Nephrology consulting. Hyperkalemia, PTA on florinef. K stable. Avoid nephrotoxins.   Endocrine: DM II, on insulin gtt. Endocrine consulted to transition to subQ insulin.  Infectious disease: Started on Zosyn, Vancomycin, Flagyl, Micafungin on admission (7/4/2017). 7/5 Blood cultures, no growth thus far. Abdominal fluid culture collected intraoperatively, gram stain with no organisms, fungal and bacterial culture, GPCs. Bronchoalveolar lavage growing VRE, colonization. Tx ID consulted.  -Fever > 101 for several days, WBC 5.9 today, stable. 7/13 CT scan negative for abscess, large amount of ascites, mildly prominent lymph nodes-no significant change. 7/15 BAL cx and 7/14 ascites cx unremarkable per ID. Ascitic fluid exudate. Continue ertapenem x 14 days. Check EBV PCR. Recheck CMV PCR. If CMV PCR low copies would consider colonoscopy to eval for CMV colitis and if CMV PCR increased copies would start treatment with IV ganciclovir.   -T max 24 hrs- 100.4   -CMV viremia - CMV D+R-, low viral count. IS reduced (MMF held). Valcyte 450 daily restarted.   -Possible drug fever. Seroquel stopped due to this  Neuro: Delirium, hold, limit medications that have CNS SE. Oxycodone PRN pain. Neurology consulted- dx likely toxic metabolic encephalopathy, improving slowly. VideoEEG today.   Access: R IJ  Vascular: Right Arterial line clot 2/2 previous art line. Vascular consulted. Recommend ASA only. Some evidence of microvascular injury in digits  (toes) this is most likely due to injury while patient was on pressor therapy and sepsis.   PT/OT  Prophylaxis: PPI, DVT-SCD  Disposition: Transfer to .    Medical Decision Making: Medium  Admit 11781 (moderate level decision making)    PILLO/Fellow/Resident Provider: Ada Driver PA-C [id number]    Faculty: Jovan Tran M.D.      __________________________________________________________________  Transplant History:.  3/4/2017 DD OLT with Dr. Tarn (Liver), Postoperative day: 135     Interval History: History is obtained from EMR and nursing staff.  Overnight events: No acute events o/n. Tmax 100.4. Loose stools, rectal pouch in place. Denies nausea. Denies abdominal pain. General discomfort.     ROS:   A 10-point review of systems was negative except as noted above.    Meds:    insulin aspart   Subcutaneous TID w/meals     cloNIDine  0.2 mg Oral BID     tacrolimus  6 mg Oral BID IS     amLODIPine  5 mg Oral Daily     valGANciclovir  450 mg Oral Daily     multivitamins with minerals  15 mL Per Feeding Tube Daily     heparin  5,000 Units Subcutaneous Q8H     aspirin  81 mg Oral Daily     lidocaine (viscous)  5 mL Topical Once     pantoprazole  40 mg Oral or Feeding Tube Daily     fludrocortisone  0.1 mg Oral Daily     sodium chloride (PF)  3 mL Intracatheter Q8H       Physical Exam:     Admit Weight: 94.2 kg (207 lb 11.2 oz) (SCALE 2)    Current vitals:   /72  Pulse 90  Temp 98.4  F (36.9  C) (Oral)  Resp 21  Ht 1.829 m (6')  Wt 93.2 kg (205 lb 7.5 oz)  SpO2 100%  BMI 27.87 kg/m2    Vital sign ranges:    Temp:  [98.3  F (36.8  C)-100.4  F (38  C)] 98.4  F (36.9  C)  Heart Rate:  [] 88  Resp:  [11-31] 21  BP: (128-177)/() 128/72  SpO2:  [93 %-100 %] 100 %  Patient Vitals for the past 24 hrs:   BP Temp Temp src Heart Rate Resp SpO2 Weight   07/17/17 1400 128/72 - - 88 21 100 % -   07/17/17 1300 154/82 - - 100 24 96 % -   07/17/17 1200 138/79 98.4  F (36.9  C) Oral 94 27 97 % -    07/17/17 1100 136/79 - - 90 28 96 % -   07/17/17 1000 149/86 - - 94 27 98 % -   07/17/17 0900 (!) 153/101 - - 93 (!) 31 100 % -   07/17/17 0800 148/76 98.6  F (37  C) Oral 92 27 99 % -   07/17/17 0700 159/80 - - 89 26 98 % -   07/17/17 0600 158/90 - - 90 27 100 % 93.2 kg (205 lb 7.5 oz)   07/17/17 0500 153/85 - - 85 27 99 % -   07/17/17 0400 139/73 98.3  F (36.8  C) Oral 89 11 98 % -   07/17/17 0321 (!) 143/92 - - 89 22 98 % -   07/17/17 0300 - - - 88 23 97 % -   07/17/17 0230 - - - 89 24 94 % -   07/17/17 0200 (!) 135/92 - - 90 26 97 % -   07/17/17 0145 - - - 93 23 97 % -   07/17/17 0100 143/80 - - 93 21 97 % -   07/17/17 0000 140/63 100  F (37.8  C) Axillary 90 16 99 % -   07/16/17 2300 141/73 - - 92 22 99 % -   07/16/17 2200 128/70 - - 97 20 98 % -   07/16/17 2100 135/89 - - 94 20 99 % -   07/16/17 2000 158/82 100.4  F (38  C) Oral 100 20 100 % -   07/16/17 1900 158/81 - - 95 20 93 % -   07/16/17 1815 177/88 - - 98 - 95 % -   07/16/17 1800 172/86 - - 97 22 96 % -   07/16/17 1700 170/85 - - 98 24 96 % -     General Appearance: NAD  Skin: normal, warm, dry  Heart: NSR  Lungs: BCTA, non labored on 2L NC. Weak cough.   Abdomen: The abdomen is soft, midline incision is c/d/i and covered. Chevron incision well healed.   : vazquez is present, yellow urine in bag.   Extremities:BLE trace edema.  Neurologic: alert, orientated. No tremor. Minimal responses to questions.     Data:   CMP    Recent Labs  Lab 07/17/17  0630 07/16/17  2142 07/16/17  0326  07/14/17  1517 07/14/17  0349    148* 148*  < >  --  144   POTASSIUM 5.1 5.0 5.1  < >  --  4.4   CHLORIDE 114* 118* 123*  < >  --  116*   CO2 21 23 22  < >  --  20   * 83 128*  < >  --  187*   BUN 66* 65* 71*  < >  --  84*   CR 1.55* 1.57* 1.66*  < >  --  1.74*   GFRESTIMATED 47* 46* 44*  < >  --  41*   GFRESTBLACK 57* 56* 53*  < >  --  50*   JACOB 7.8* 7.6* 7.8*  < >  --  8.0*   MAG  --   --  2.4*  --   --  2.2   PHOS  --   --  3.8  --   --  3.7   ALBUMIN 1.9*   --  1.8*  --   --  2.0*   BILITOTAL 1.0  --  0.3  --   --  0.5   ALKPHOS 144  --  134  --   --  171*   *  --  72*  --   --  68*   ALT 58  --  44  --   --  36   FAMY  --   --   --   --  6  --    < > = values in this interval not displayed.  CBC    Recent Labs  Lab 07/17/17  0630 07/16/17  0326   HGB 7.7* 7.4*   WBC 5.9 5.4    141*     COAGS    Recent Labs  Lab 07/17/17  0630   INR 1.19*      Urinalysis  Recent Labs   Lab Test  07/11/17   1105  07/06/17   1441  06/14/17   1508   04/11/16   1345   COLOR  Yellow  Yellow   --    < >  Yellow   APPEARANCE  Clear  Cloudy   --    < >  Clear   URINEGLC  Negative  Negative   --    < >  30*   URINEBILI  Negative  Negative   --    < >  Negative   URINEKETONE  Negative  Negative   --    < >  Negative   SG  1.009  1.014   --    < >  1.016   UBLD  Negative  Moderate*   --    < >  Small*   URINEPH  5.0  5.0   --    < >  5.0   PROTEIN  10*  30*   --    < >  30*   NITRITE  Negative  Negative   --    < >  Negative   LEUKEST  Negative  Trace*   --    < >  Negative   RBCU  <1  >182*   --    < >  1   WBCU  <1  9*   --    < >  1   UTPG   --    --   1.55*   --   0.41*    < > = values in this interval not displayed.       Cultures:   Blood Cultures x2 7/4/2017 NGTD     Abdominal Fluid Culture 7/4/2017: NGTD    Abdominal Fluid Culture 7/5/17: NGTD    Bronchoalveolar Culture 7/5/17: VRE.     Sputum endotracheal gm stain/culture 7/11/17: >25 PMNs/low power field   Few Mixed gram positive elvie    CT scan:    7/4/2017 CONCLUSION:   1. Right colonic wall thickening suggesting colitis. Follow-up is necessary to confirm resolution in order to exclude colonic mass.  2. Transverse colon is not well distended reducing evaluation for wall thickening.  3. Small amount of ascites.  4. Splenomegaly.  5. Prominent portal and splenic veins. If confirmation of vascular patency is indicated consider follow-up ultrasound of the liver with Doppler.  6. Small right pleural effusion.  7. Stranding  in the subcutaneous fat of the ventral pelvis.     Abdominal XR 7/4/2017:   1. No evidence of pneumoperitoneum.  2. Dilated loop of bowel in the central abdomen, likely sigmoid.    XR Chest Portable 1 View 7/4/17:  1. Endotracheal tube tip projects 5.3 cm from the chacorta.  2. Increased bilateral pleural effusions, right greater than left.  3. Unchanged bibasilar patchy opacities.

## 2017-07-17 NOTE — CONSULTS
Diabetes/Hyperglycemia Management Consult    Chief Complaint Type 2 diabetes, hyperglycemia 2/2 TF  Consult requested by: Ada Driver PA-C  History of Present Illness Camacho Bhagat is a 51 year old male with history of  Type 2 diabetes, hyperlipidemia, hypertension, end stage liver disease 2/2 CASTAÑEDA  s/p  donor liver transplant (3/4/2017) who presented to [facility] with abdominal pain and fever (2017). Found to have neutropenic colitis.    Has underwent exploratory laparotomy due to concern for perforation on (2017)  2017, abdominal washout and closure    Hospital course has been complicated by toxic metabolic encephalopathy, septic shock ( resolved), right distal artery occlusion, acute respiratory failure ( now extubated), EJ, VRE HAP pneumonia ( continues on ertapenem).    Protein calorie deficit, was on TPN, now on TF via nasoduodenal feeding Tube. TF continuous Peptamen 1.5 at  goal 80 cc/hour.  Has started on dextrose fluids for hypernatremia at 100 cc/hour.( started )  Insulin needs are variable and range from 1 to 7 units with glucose < 200.  Po intake for nectar-thick full liquid diet, speech evaluated and noted mild oral-pharyngeal dysphagia in the presence of confusion and deconditioning post extubation.    Mr. Bhagat is known to the inpatient diabetes team. After transplant, he was discharged on Lantus 45 units, Novolog 1 unit per 10 grams of CHO and Novolog 1 unit per 50 > 140 before meals and > 200 HS.  Called [facility], diabetes management plan  as per below    Currently, unable to do ROS, was focused on the electrodes being applied for EMG.    Recent Labs  Lab 17  1025 17  0908 17  0630 17  0606 17  0411 17  0226 17  0031  17  2142  17  0326  07/15/17  0627  17  0349  17  1536   GLC  --   --  239*  --   --   --   --   --  83  --  128*  --  200*  --  187*  --  162*   * 166*  --  173*  192* 169* 166*  < >  --   < >  --   < > 190*  < >  --   < >  --    < > = values in this interval not displayed.      Diabetes Type:   Type 2 Diabetes  Diabetes Duration: dx 2001  Usual Diabetes Regimen: Tradjenta ( linagliptin) 5 mg and Lantus 100 units, per Endocrinology clinic  Per H&P, lantus 50 units daily, Novolog 1 unit per 8 grams of CHO with meals/snacks, not taking Tradjenta  Ability to [place] Prescribed Regimen: if not impaired form encephalopathy  Diabetes Control:   Lab Results   Component Value Date    A1C  07/06/2017     Canceled, Test credited   Below Assay Range  NOTIFIED LEONARD ONEILL AT 0538 ON 7/6/17 BY CHRISSY      A1C 9.4 02/16/2017    A1C 6.2 12/14/2016    A1C 6.1 10/27/2016    A1C 8.3 07/02/2012     Diabetes Complications: per previous consult notes, mr. Bhagat had denies chronic complications from diabetes  History of DKA: unknown  Able to Detect Hypoglycemia: yes, in the past  Usual Diabetes Care Provider: dr. Raman  Factors Impacting Glucose Control: TF      Review of Systems  10 point ROS completed with pertinent positives and negatives noted in the HPI    Past medical, family and social histories are reviewed and updated.    Past Medical History, reviewed and updated as indicated  Past Medical History:   Diagnosis Date     Cancer (H)      Depressive disorder, not elsewhere classified      Esophageal reflux      Fibromyalgia 1/2009    dx with Dr Benitez( Rheum)     History of thrombophlebitis      Osteoarthritis      Other acute embolism veins 11/01    Deep vein thrombophlebitis, filter placed     Other and unspecified hyperlipidemia      Other chronic nonalcoholic liver disease     Fatty liver      Other testicular hypofunction      Pneumonia, organism 10-01    Included ARDS, sepsis, and  acute renal failure; hospitalized     Rheumatoid arthritis(714.0)      Type II or unspecified type diabetes mellitus without mention of complication, not stated as uncontrolled 11/01    Managed by  endocrinology     Unspecified essential hypertension 11/01    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     Unspecified sleep apnea     Uses BiPAP       Family History, reviewed and updated as indicated  Family History   Problem Relation Age of Onset     DIABETES Father      Hypertension Father      Substance Abuse Father      Arthritis Mother      CANCER Mother      Thyroid     Thyroid Disease Mother      Other Cancer Mother      Colon Cancer No family hx of      Hyperlipidemia No family hx of      Coronary Artery Disease No family hx of      CEREBROVASCULAR DISEASE No family hx of      Breast Cancer No family hx of      Prostate Cancer No family hx of        Social History  Social History     Social History     Marital status:      Spouse name: N/A     Number of children: 1     Years of education: N/A     Occupational History            Social History Main Topics     Smoking status: Former Smoker     Smokeless tobacco: Former User     Types: Chew     Quit date: 10/8/2015      Comment: Has used chewing tobacco since age 15 , chewed for 20yrs      Alcohol use No      Comment: last drink about a year ago (quit 2001)     Drug use: No     Sexual activity: Yes     Partners: Female     Other Topics Concern     Not on file     Social History Narrative    uSED TO BE      Back in school now             Physical Exam  /79  Pulse 90  Temp 98.4  F (36.9  C) (Oral)  Resp 27  Ht 1.829 m (6')  Wt 93.2 kg (205 lb 7.5 oz)  SpO2 97%  BMI 27.87 kg/m2    General:  NAD  HEENT: NC/AT, PER and anicteric, non-injected, oral mucous membranes moist.   Lungs: unlabored  ABD: obese, soft  Skin: warm and dry,   MSK: moves upper extremities  Mental status:  alert, respond to name  ( date and time not assessed) confused    Laboratory  Recent Labs   Lab Test  07/17/17   0630  07/16/17   2142   NA  142  148*   POTASSIUM  5.1  5.0   CHLORIDE  114*  118*   CO2  21  23   ANIONGAP  7  6    GLC  239*  83   BUN  66*  65*   CR  1.55*  1.57*   JACOB  7.8*  7.6*     CBC RESULTS:   Recent Labs   Lab Test  07/17/17   0630   WBC  5.9   RBC  2.74*   HGB  7.7*   HCT  24.4*   MCV  89   MCH  28.1   MCHC  31.6   RDW  14.7   PLT  160       Liver Function Studies -   Recent Labs   Lab Test  07/17/17   0630   PROTTOTAL  5.5*   ALBUMIN  1.9*   BILITOTAL  1.0   ALKPHOS  144   AST  101*   ALT  58       Active Medications  Current Facility-Administered Medications   Medication     oxyCODONE (ROXICODONE) IR tablet 5-10 mg     HYDROmorphone (PF) (DILAUDID) injection 0.3-0.5 mg     dextrose 5% infusion     cloNIDine 0.1 mg/mL (CATAPRES) solution 0.2 mg     hydrALAZINE (APRESOLINE) injection 20 mg     tacrolimus (PROGRAF - GENERIC EQUIVALENT) suspension 6 mg     amLODIPine (NORVASC) suspension 5 mg     0.9% sodium chloride infusion     valGANciclovir (VALCYTE) tablet 450 mg     senna-docusate (SENOKOT-S;PERICOLACE) 8.6-50 MG per tablet 2 tablet     polyethylene glycol (MIRALAX/GLYCOLAX) Packet 17 g     midazolam (VERSED) injection 1-2 mg     ertapenem (INVanz) 1 g vial to attach to  mL bag     multivitamins with minerals (CERTAVITE/CEROVITE) liquid 15 mL     heparin sodium PF injection 5,000 Units     aspirin chewable tablet 81 mg     lidocaine (viscous) (XYLOCAINE) 2 % solution 5 mL     dextrose 10 % 1,000 mL infusion     pantoprazole (PROTONIX) suspension 40 mg     insulin 1 unit/mL in saline (NovoLIN, HumuLIN Regular) drip - ADULT IV Infusion     glucose 40 % gel 15-30 g    Or     dextrose 50 % injection 25-50 mL    Or     glucagon injection 1 mg     fludrocortisone (FLORINEF) tablet 0.1 mg     lidocaine 1 % 1 mL     lidocaine (LMX4) kit     sodium chloride (PF) 0.9% PF flush 3 mL     sodium chloride (PF) 0.9% PF flush 3 mL     ondansetron (ZOFRAN-ODT) ODT tab 4 mg    Or     ondansetron (ZOFRAN) injection 4 mg     bisacodyl (DULCOLAX) Suppository 10 mg     naloxone (NARCAN) injection 0.1-0.4 mg     acetaminophen  (TYLENOL) tablet 650 mg    Or     acetaminophen (TYLENOL) Suppository 650 mg     potassium chloride SA (K-DUR/KLOR-CON M) CR tablet 20-40 mEq     potassium chloride (KLOR-CON) Packet 20-40 mEq     potassium chloride 10 mEq in 100 mL intermittent infusion     potassium chloride 10 mEq in 100 mL intermittent infusion with 10 mg lidocaine     potassium chloride 20 mEq in 50 mL intermittent infusion     sodium phosphate 10 mmol in D5W intermittent infusion     sodium phosphate 15 mmol in D5W intermittent infusion     sodium phosphate 20 mmol in D5W intermittent infusion     sodium phosphate 25 mmol in D5W intermittent infusion     magnesium sulfate 2 g in NS intermittent infusion (PharMEDium or FV Cmpd)     magnesium sulfate 4 g in 100 mL sterile water (premade)     No current outpatient prescriptions on file.       Current Diet    Active Diet Order      Full Liquid Diet Nectar Thickened Liquids (pre-thickened or use instant food thickener)      Assessment: Camacho Bhagat is a 51 year old male with history of  Type 2 diabetes, hyperlipidemia, hypertension, end stage liver disease 2/2 CASTAÑEDA  s/p  donor liver transplant (3/4/2017) who presented to [facility] with abdominal pain and fever (2017). Found to have neutropenic colitis.    Type 2 diabetic:   TF via nasoduodenal feeding Tube. TF continuous Peptamen 1.5 at  goal 80 cc/hour.    Plan  -continue with IV insulin for now, especially when being treated with dextrose fluids for hypernatremia  - will add meal insulin 1 unit per 10 grams of CHO with meals/snacks/supplements  -transition when additional dextrose fluids are discontinued and nutrition plan is determined    Will continue to follow    Alesha Sauer, [phone]    Diabetes Management Team job code: [id number]

## 2017-07-17 NOTE — PLAN OF CARE
Problem: Goal Outcome Summary  Goal: Goal Outcome Summary  Clinical swallow evaluation completed per MD order. Pt presents with mild oral-pharyngeal dysphagia in the presence of confusion and deconditioning post extubation. Overt s/sx of aspiration observed with small trials of thin liquids via spoon. Pt tolerated ice chips, nectar-thick liquids, and puree textures without s/sx of airway compromise, but mildly reduced oral awareness observed. Advanced PO trials not presented given presence of NG to suction. From swallow perspective, recommend initiate nectar-thick full liquid diet as tolerated when awake/alert and sitting upright. Pt should have supervision/assist for all PO intake. Ice chips ok as tolerated. ST to follow for PO tolerance and to assist with diet advance as appropriate

## 2017-07-17 NOTE — PLAN OF CARE
Problem: Goal Outcome Summary  Goal: Goal Outcome Summary  Outcome: Improving  D=Pt remain confused but follows commands.Heart rate in 80s -90s no ectopy,SBP in 130s-150s.O2 sats 96-98% on 2 liters via nasal cannula. Pt has good cough but swallows secretios,TF at 80cc/hr w/ 100cc free water every hour.Abdominal staples intact incison dry. Pt has dusky finger tips and toes. Rt Radia l pulse absent but all pulses palpable.Pt  Temp at MN was 100.Pt was given Tylenol,temp at 0400 was 98.3.Rectal tube came out twice. Rectal pouch was applied.Pt has good urine output.Insulin drip titrated per protocol.  D5W infusing at 100cc/hrPt was medicated w/ Oxycodone  For pain  P=Continue to closely monitor pt

## 2017-07-17 NOTE — PLAN OF CARE
Problem: Goal Outcome Summary  Goal: Goal Outcome Summary  Outcome: No Change  Neuro: Afebrile, disoriented x3, oriented to self, EEG started  Resp: 2 L NC, tachypnea upper 20s  Cardio: Sinus rhythm, HR 90s, systolic blood pressure 130-150  GI: Smear, rectal pouch fell off, TF goal, NG clamped at noon  : Crowe, 100 cc/hr  Pain: PRN oxycodone, generalized pain  Access: 2 PIVs  Drips: TKO, insulin (alg 3)  Skin: Midline staples ROC  Up to chair with assist of lift.  Patient's mother at bedside before patient transferred to .  Transferred to  with transport at 1620 (transport called at 1500) not on cardiac monitoring.  Belongings sent with patient.  P: Head CT this evening.

## 2017-07-17 NOTE — PROGRESS NOTES
"[facility], [place]   Neurology Daily Note                Summary:     51 year old male with Hx of liver transplant (3/2017) for ESLD due to CASTAÑEDA and hepatocarcinoma  on Tacrolimus who is admitted on 7/4 after laparoscopy for  neutropenic colitis and septic shock. The hospital course has been complicated by development of severe neutropenia (ANC <500),  thrombocytopenia, anemia now improved, pleural effusion, EJ , radial occlusion, intubation and CMV viremia.      Neurology was consulted due to altered mental status.                 Overnight:     No major events overnight                 Notes reviewed     Nurses: Pt remains confused.  Unable to answer any orientation questions  Pt max A for rolling, dependent lift. Pt limited by strength and cognition   ICU:  Pt awke this am and very depressed.  Wants to just die because \" all fo this is just too hard.\"  Valcyte has been restarted per ID rec's, Uremia improving,  Speech: Pt presents with mild oral-pharyngeal dysphagia in the presence of confusion and deconditioning post extubation                     Medications:       Current Facility-Administered Medications   Medication     oxyCODONE (ROXICODONE) IR tablet 5-10 mg     HYDROmorphone (PF) (DILAUDID) injection 0.3-0.5 mg     cloNIDine 0.1 mg/mL (CATAPRES) solution 0.2 mg     hydrALAZINE (APRESOLINE) injection 20 mg     tacrolimus (PROGRAF - GENERIC EQUIVALENT) suspension 6 mg     amLODIPine (NORVASC) suspension 5 mg     0.9% sodium chloride infusion     valGANciclovir (VALCYTE) tablet 450 mg     senna-docusate (SENOKOT-S;PERICOLACE) 8.6-50 MG per tablet 2 tablet     polyethylene glycol (MIRALAX/GLYCOLAX) Packet 17 g     midazolam (VERSED) injection 1-2 mg     multivitamins with minerals (CERTAVITE/CEROVITE) liquid 15 mL     heparin sodium PF injection 5,000 Units     aspirin chewable tablet 81 mg     lidocaine (viscous) (XYLOCAINE) 2 % solution 5 mL     dextrose 10 % 1,000 mL infusion "     pantoprazole (PROTONIX) suspension 40 mg     insulin 1 unit/mL in saline (NovoLIN, HumuLIN Regular) drip - ADULT IV Infusion     glucose 40 % gel 15-30 g    Or     dextrose 50 % injection 25-50 mL    Or     glucagon injection 1 mg     fludrocortisone (FLORINEF) tablet 0.1 mg     lidocaine 1 % 1 mL     lidocaine (LMX4) kit     sodium chloride (PF) 0.9% PF flush 3 mL     sodium chloride (PF) 0.9% PF flush 3 mL     ondansetron (ZOFRAN-ODT) ODT tab 4 mg    Or     ondansetron (ZOFRAN) injection 4 mg     bisacodyl (DULCOLAX) Suppository 10 mg     naloxone (NARCAN) injection 0.1-0.4 mg     acetaminophen (TYLENOL) tablet 650 mg    Or     acetaminophen (TYLENOL) Suppository 650 mg     potassium chloride SA (K-DUR/KLOR-CON M) CR tablet 20-40 mEq     potassium chloride (KLOR-CON) Packet 20-40 mEq     potassium chloride 10 mEq in 100 mL intermittent infusion     potassium chloride 10 mEq in 100 mL intermittent infusion with 10 mg lidocaine     potassium chloride 20 mEq in 50 mL intermittent infusion     sodium phosphate 10 mmol in D5W intermittent infusion     sodium phosphate 15 mmol in D5W intermittent infusion     sodium phosphate 20 mmol in D5W intermittent infusion     sodium phosphate 25 mmol in D5W intermittent infusion     magnesium sulfate 2 g in NS intermittent infusion (PharMEDium or FV Cmpd)     magnesium sulfate 4 g in 100 mL sterile water (premade)       Exam       /82  Pulse 90  Temp 98.4  F (36.9  C) (Oral)  Resp 24  Ht 1.829 m (6')  Wt 93.2 kg (205 lb 7.5 oz)  SpO2 96%  BMI 27.87 kg/m2    Constitutional : well-built, laying in bed  Head: atraumatic, anicteric. See neuroexam  Eyes: see neuroexam  CVC: sinus on monitor  LUng: on room air without distress  extremities: purple finger on R hand and purple toes on R foot. Seems to have pain on arm and leg.   Neuroexam  Alert. Follow some commands. Orientation fluctuates. Still showing confusion  Eyes tracks and make eye contact.   Face  symmetric  Eyes: motility preserved on horizontal axis, no nystagmus  Tongue centered  No tremors  He could elevates his arms and legs over gravity today.   Reflexes are preserved on arms, absent on legs.                 Data:      CBC RESULTS:   Recent Labs   Lab Test  07/17/17   0630   WBC  5.9   RBC  2.74*   HGB  7.7*   HCT  24.4*   MCV  89   MCH  28.1   MCHC  31.6   RDW  14.7   PLT  160     Last Basic Metabolic Panel:  Lab Results   Component Value Date     07/17/2017      Lab Results   Component Value Date    POTASSIUM 5.1 07/17/2017     Lab Results   Component Value Date    CHLORIDE 114 07/17/2017     Lab Results   Component Value Date    JACOB 7.8 07/17/2017     Lab Results   Component Value Date    CO2 21 07/17/2017     Lab Results   Component Value Date    BUN 66 07/17/2017     Lab Results   Component Value Date    CR 1.55 07/17/2017     Lab Results   Component Value Date     07/17/2017            Results for RONNIE ARIZMENDI (MRN [id number]) as of 7/16/2017 16:38    Ref. Range 6/26/2017 10:38 7/3/2017 10:32 7/10/2017 03:40 7/11/2017 03:28 7/15/2017 15:53   CMV Quant IU/mL Latest Ref Range: CMVND [IU]/mL CMV DNA Not Detec... <137... (A) 145 (A)   4225 (A)   Log IU/mL of CMVQNT Latest Ref Range: <2.1 Log_IU/mL Not Calculated <2.1 2.2 (H)   3.6 (H)       tacrolimus : 5.1       Inr: 1.19             Assessment and Plan:     50 yo man with DM2, 5 liver transplant (3/2017) for ESLD due to CASTAÑEDA and hepatocarcinoma  on Tacrolimus who is admitted on 7/4 after laparoscopy for  neutropenic colitis, EJ and septic shock with encephalopathy      Encephalopathy: Likely multifactorial. He is on ertapenem and tacrolimues, was recently under sedation beside infection and EJ, as CMV viremia. Due to the fact that his encephalopathy does fluctuates:  Neurology suggests:   - videoeeg to rule out seizures (ordered for you)  - MRI brain with and without contrast (preference after videoEEG done) or CT brain    -  consider LP is heparin can be discontinued.      Neurology will keep following        Attestation:  Patient discussed over the phone with Dr. Karthik Sands  PGY4 NEurology  [id number]  I saw the patient with the resident.  I agree with the resident note and plan of care.      Lauro Garza MD

## 2017-07-17 NOTE — PROGRESS NOTES
Pre - Gonsalez EEG report on 7/17/2017 1st 30 minutes   Full report to follow.     This EEG is abnormal due to the presences of intermittent generalized theta slowing. There is a  6-7 hz symmetric parieto-occipital rhythm.     This EEG is consistent with a mild to moderate encephalopathy. No epileptiform discharges or electrographic seizures were seen in this record. If events of interest are noted, please press the event button. Clinical correlation is advised.     Irene Hernandez MD  Staff Epilepsy Neurologist   [phone]

## 2017-07-17 NOTE — PLAN OF CARE
Problem: Goal Outcome Summary  Goal: Goal Outcome Summary  D: neutropenic colitis s/p exploratory lap and washout.      A:  Pt remains confused.  Unable to answer any orientation questions.  PERRL.  SANTIAGO to command. Sensitive to touch.  On 2L O2 via NC.  LS clear.  VSS.  TMax 100.4.  TF continued at 80 ml/hr with 100 ml/hr H2O flushes.  Rectal tube patent.  Crowe to gravity.  Restraints d/c'd at 2000, mitts applied.  Pt tolerating well.      P:  Will continue to monitor and notify MD of changes in condition.

## 2017-07-17 NOTE — PROGRESS NOTES
[facility], [place]    SICU Service  Progress Note    Date of Service (when I saw the patient): 07/17/2017    Main Plans for Today   CT vs MRI  Transfer to      Assessment & Plan   Camacho Bhagat is a 51 year old male who was admitted on 7/4/2017 with abdominal pain and fever.  He was found to have neutropenic colitis.  He underwent liver transplant March 2017 for CASTAÑEDA.    Procedures:  7/4/2017   Exploratory laparotomy due to concern for perforation, viable hepatic flexure   7/5/2017    Abdominal washout and closure  7/5/2017    Left axillary arterial line, bronchoscopy and BAL   7/9/2017    Right pleural pigtail catheter, removed 7/12 7//11/2017 Bronchoscopy  7/13/2017  Bronchoscopy     NEUROLOGIC:  # Toxic metabolic encephalopathy   # Acute pain  - pain: Tylenol 650 mg PO Q4H PRN, oxycodone 5-10 mg q6, Dilaudid 0.3-.5mg q2 IV PRN  - no sedation   - neurology consulted 7/11,  AMS likely 2/2 toxic encephalopathy.  Now requesting imaging to evaluate for watershed infarcts.  Will clarify which study.    - Will stop seroquel due to concern for drug fever.     CV:  #Septic shock; resolved   #History of DVT with IVC filter, with also Distal right radial artery occlusion   -ASA for radial artery occlusion per vascular surgery  #Hypertension  - PTA Amlodipine 5mg, clonidine 0.2 BID      PULMONARY:  #Acute respiratory failure, hypoxia  #Right pleural effusion s/p pigtail placement   - Patient extubated, maintaining saturations  - Mental status improving daily but remains weak; will need aggressive pulmonary cares  - R pigtail removed 7/12      RENAL:  #EJ  #Hypernatremia   - UOP adquate  - EJ improving, creatinine improving. Nephrology s/o  - Uremia improving  - Dextrose 5%@100mL/hr sodium much improved.  Will continue PO water (100 ml q 1), but s/l IV after determining +/- contrast today      ID:  #Neutropenic colitis   #VRE HAP pneumonia  #Fevers  - Continue ertapenem for total of 2 weeks  "epiric treatment of colitis  - CT abdomen without abscess formation  - Pan cultured 7/11; No growth to date  #CMV PCR elevated from blood sample 7/11, following PC  - Valcyte has been restarted per ID rec's      HEME:  #Pancytopenia  #Acute blood loss with chronic anemia  # History of DVT with IVC filter  - Plts stable  - Hgb stable   - Neupogen 480 mcg given 7/4-7/7   - US 7/11 showing distal right radial artery occlusion, vascular surgery consulted, ASA 81daily      GI/NUTRITION:  #Protein/caloriedeficit malnutrition   #Colitis, s/p ex laparotomy (7/5)  # CASATÑEDA s/p liver transplant (3/2017)  - Off TPN.  Advance TFat goal =80  - Senna, Miralax PRN; diarrhea.   - Rectal tube for ongoing loose stools       ENDOCRINE:  #DMII  - Continue home fludrocortisone 0.1 mg daily  - Continue insulin drip.  Transition to long acting and SSI when nutrition carb intake stable.      MSK:  #Immobilty   - PT/OT   - Mobilize      VASCULAR ACCESS:  - [place]  - PIV         GENERAL CARES  DVT Prophylaxis: Heparin SQ  GI Prophylaxis: PPI  Restraints: Restraints for medical healing needed: YES  Code status:  Full  Disp:  SICU  Makenna Cordova    Time Spent on this Encounter   Billing:  I spent 60 minutes bedside and on the inpatient unit today managing the critical care of Camacho Bhagat in relation to the issues listed in this note.    Interval History   No acute events.  Pt awke this am and very depressed.  Wants to just die because \" all fo this is just too hard\" abd pain all over, hip pain.  No SOB, + vazquez, + Rectal tube, otherwise ROS x 8 negative    Physical Exam   Temp: 98.6  F (37  C) Temp src: Oral Temp  Min: 98.3  F (36.8  C)  Max: 100.4  F (38  C) BP: 149/86   Heart Rate: 94 Resp: 27 SpO2: 98 % O2 Device: Nasal cannula Oxygen Delivery: 2 LPM  Vitals:    07/15/17 1200 07/16/17 0600 07/17/17 0600   Weight: 92.9 kg (204 lb 12.9 oz) 93.5 kg (206 lb 2.1 oz) 93.2 kg (205 lb 7.5 oz)     I/O last 3 completed shifts:  In: 3523.72 " [I.V.:2298.72; NG/GT:2605]  Out: 4070 [Urine:2870; Emesis/NG output:900; Stool:300]    GEN: AA  EYES: PERRL, Anicteric sclera.   HEENT:  Normocephalic, atraumatic, trachea midline  CV: RRR, no gallops, rubs, or murmurs  PULM/CHEST: decreased  breath sounds bilaterally without rhonchi, crackles or wheeze, symmetric chest rise  GI: normal bowel sounds, distended, tender, guarding  : vazquez catheter in place, urine yellow and clear  EXTREMITIES: ++  peripheral edema, moving all extremities, peripheral pulses intact, RUE more firm than left  NEURO: Cranial nerves II-XII grossly intact, no focal deficits noted  SKIN: No rashes, sores or ulcerations  PSYCH:  Affect: anxious  Imaging personally reviewed: no new  ECG  SR

## 2017-07-17 NOTE — PROGRESS NOTES
[facility]    Transplant Infectious Diseases Inpatient Progress note      Camacho Bhagat MRN# [id number]   YOB: 1965 Age: 51 year old   Date of Admission: 7/4/2017  4:45 AM  Transplant: 3/4/2017 (Liver), Postoperative day: 135            Recommendations:   1. CMV PCR of blood.  - decision regarding VGCV depends on CMV PCR of blood.   2. Continue ertapenem for now.   3. Please add cytology of ascitic fluid if not sent yet, as well as leukemia/lymphoma.   4. Check EBV PCR of blood.   5. Colonoscopy with biopsy to rule out CMV colitis also make sure no GI PTLD.         Summary of Presentation:   Transplants:  3/4/2017 (Liver), Postoperative day:  135     This patient is a 51 year old male with CASTAÑEDA s/p OLT in 3/2017. Basilixima b for induction. Now on TAC.   Presented with abdominal pain and was found to have neutropenic colitis s/p exploratory laparotomy.   Persistent fever on broad ABx.         Active Problems and Infectious Diseases Issues:   1. SIRS with fever  2. Neutropenic colitis (without ischemia per ex lap)  3. Splenomegaly.   The fever curve is with overall improvement though it could be masked with the use of tylenol.   The fever is likely due to combination of resolving neutropenic colitis and ischemic injury to extremities with occlusion of right radial artery.   Continue ertapenem for now (7/18/2017 will be finishing 14 days of ABx).   The ascitic fluid analysis is c/w exudate but cx negative to date. That could be due to inflamed bowels. But with splenomegaly and recent pancytopenia maybe we should also rule out EBV related disease in this serodiscordant patient. Would check EBV PCR in blood and cytology of ascitic fluid.     4. CMV primary infection   Now the patient is on VGCV ppx dose but with low level viremia.   This low level viremia is not known to cause fever unless associated with disease.   In this patient who is at risk for drug induced neutropenia  which may have caused neutropenic colitis I would encourage colonoscopy to rule out CMV colitis. Would also repeat CMV PCR today 7/17/2017.   The CMV in the BAL is of no clinical significance.     5. 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     5. Staphylococcus capitis in ascitic fluid 7/5/2017   Contamination.     7. Rhinovirus in BAL 7/15/2017  Likely to be due to chronic shedding since the patient's main presentation was mostly abdominal not respiratory.      Other Infectious Disease issues include  - PCP prophylaxis: bactrim DCed in June of 2017 due to neutropenia.   - Serostatus: CMV D+/R-, EBV D+/R-, HSV1?/2?, VZV ?  - Immunization status: up-to-date  - Gamma globulin status: not recently checked.     Discussed with primary team.   Attestation:  I interviewed the patient and obtained history from the patient, and by reviewing the patient's chart including outside records, microbiological data, and radiological data. All data are summarized in this notes.  Naseem Figueroa   Pager: [phone]  07/17/2017        HPI:  In summary:  CASTAÑEDA s/p OLT in 3/2017.   Presented with abdominal pain and underwent exploratory laparotomy which was not c/w with ischemia. It was felt to be related to neutropenia.   The patient has neutropenia since June of 2017 attributed to immunosuppressive therapy/bactrim/VGCV.   Bactrim DCed June of 2017 and VGCV was DCed at unknown tome.     The patient was started on broad spectrum ABx with persistent fever.   Bronchoscopy done 7/12/2017 grew single colony of VRE which was considered a colonizer.   Ascitic fluid checked on 7/5/2017 and grew S capitis considered a contaminant.   Repeat BAL on 7/15/2017 and repeat paracentesis on 7/7/14/2017 are so far unremarkable for growth but the ascitic fluid is an exudate.     The course was complicated by thrombosis of the right radial artery with ischemia to to the tip of the right index finger.    He also has ischemia to the right hallux and second toe.     The patient today is alert and oriented to self/time and somewhat to place.   He was experiencing abdominal pain which resolved with pain meds.   He has no additional complaints.       ROS:  As mentioned in the interim history otherwise negative by reviewing central and peripheral neurological systems, respiratory system, cardiac system, GI system,  system, musculoskeletal, skin, allergy, and lymphatics.                 Pysical Examination:  Temp: 98.6  F (37  C) Temp src: Oral BP: 149/86   Heart Rate: 94 Resp: 27 SpO2: 98 % O2 Device: Nasal cannula Oxygen Delivery: 2 LPM  Vitals:    07/14/17 0400 07/15/17 0000 07/15/17 1200 07/16/17 0600   Weight: 92.3 kg (203 lb 7.8 oz) 94.5 kg (208 lb 5.4 oz) 92.9 kg (204 lb 12.9 oz) 93.5 kg (206 lb 2.1 oz)    07/17/17 0600   Weight: 93.2 kg (205 lb 7.5 oz)     Constitutional: awake, alert, cooperative, no apparent distress and appears at stated age, well nourished.   Head, ENT, Eyes, and Neck: Normocephalic, PERRL, EOMI, pink conjunctivae, non-icteric sclera.   Neck supple without rigidity, no cervical/axillary/inguinal LA bilaterally.    Neurologic: Patient is moving all extremities without focal deficit, no focal sensory loss.   Lungs: CTA bilaterally anteriorly, no accessory muscle use.   CVS: RRR, normal S1/S2, no murmur.   Abdomen: generalized tenderness, distended, no masses, no bruit, normal BS.   Genitalia: vazquez catheter in place, no lesions were visualized and no tenderness to plapation.   Extremities: +2-3 pitting edema of bilateral lower extremities, ischemic right hallux and right second toe, also ischemic tip of right index, no ulcers, normal ROM of all joints, no swelling or erythema of any of joints and no tenderness to palpation.   Skin: as the exam of the extremities, no induration, fluctuation or discharge,and no rash       Medications:  Medications that Require Transfusion:     D5W 200 mL/hr at  07/17/17 0904     NaCl 10 mL/hr at 07/16/17 1000     IV fluid REPLACEMENT ONLY       insulin (regular) 7 Units/hr (07/17/17 1031)   Scheduled Medications:     cloNIDine  0.2 mg Oral BID     tacrolimus  6 mg Oral BID IS     amLODIPine  5 mg Oral Daily     valGANciclovir  450 mg Oral Daily     ertapenem (INVanz) IV  1 g Intravenous Q24H     multivitamins with minerals  15 mL Per Feeding Tube Daily     heparin  5,000 Units Subcutaneous Q8H     aspirin  81 mg Oral Daily     lidocaine (viscous)  5 mL Topical Once     pantoprazole  40 mg Oral or Feeding Tube Daily     fludrocortisone  0.1 mg Oral Daily     sodium chloride (PF)  3 mL Intracatheter Q8H       Laboratory Data:   No results found for: ACD4    Inflammatory Markers    Recent Labs   Lab Test  07/05/17   1810  03/05/17   0358  11/23/16   0813  11/16/16   0706  11/15/16   1039  11/07/16   0759  11/03/16   0949  11/01/16   0853   08/15/11   1151  04/11/11   1209  01/03/11   1246  02/15/10   1232   SED   --    --   45*   --    --    --    --    --    --   11  14  17*  25*   CRP  354.0  20.0*  58.0*  59.1*  49.8*  66.0*  140.0*  150.0*   < >  11.1*  9.0*  11.7*  17.5*    < > = values in this interval not displayed.       Immune Globulin Studies     Recent Labs   Lab Test  08/15/11   1243   IGG  1160   IGG1  734   IGG2  294   IGG3  7*   IGG4  59       Metabolic Studies       Recent Labs   Lab Test  07/17/17   0630  07/16/17   2142  07/16/17   0326  07/15/17   0627  07/14/17   0349  07/13/17   2343  07/13/17   1536   07/11/17   0328   07/10/17   0340   07/06/17   0316   NA  142  148*  148*  148*  144   --   143   < >  150*   < >  149*   < >  143   POTASSIUM  5.1  5.0  5.1  4.8  4.4  4.7  4.3   < >  3.8   < >  3.3*   < >  5.0   CHLORIDE  114*  118*  123*  120*  116*   --   115*   < >  120*   < >  116*   < >  116*   CO2  21  23  22  21  20   --   21   < >  20   < >  21   < >  18*   ANIONGAP  7  6  3  7  8   --   8   < >  11   < >  12   < >  9   BUN  66*  65*  71*  73*   84*   --   88*   < >  140*   < >  125*   < >  62*   CR  1.55*  1.57*  1.66*  1.56*  1.74*   --   1.75*   < >  2.57*   < >  2.62*   < >  2.75*   GFRESTIMATED  47*  46*  44*  47*  41*   --   41*   < >  26*   < >  26*   < >  24*   GLC  239*  83  128*  200*  187*   --   162*   < >  137*   < >  171*   < >  139*   A1C   --    --    --    --    --    --    --    --    --    --    --    --   Canceled, Test credited   Below Assay Range  NOTIFIED LEONARD ONEILL AT 0538 ON 7/6/17 BY EAANTOLIN     JACOB  7.8*  7.6*  7.8*  8.0*  8.0*   --   7.7*   < >  8.2*   < >  8.8   < >  6.9*   PHOS   --    --   3.8   --   3.7  4.1  4.2   < >  4.4   < >  3.5   < >  5.5*   MAG   --    --   2.4*   --   2.2  2.1  2.1   < >  2.3   < >  2.4*   < >  2.1   LACT   --    --    --    --    --    --    --    --   0.9   --   0.9   < >  1.5    < > = values in this interval not displayed.       Hepatic Studies    Recent Labs   Lab Test  07/17/17   0630  07/16/17   0326  07/14/17   0349  07/13/17   0344  07/12/17   0342  07/11/17   0328   07/05/17   1810   BILITOTAL  1.0  0.3  0.5  0.4  0.4  0.5   < >   --    ALKPHOS  144  134  171*  146  168*  158*   < >   --    ALBUMIN  1.9*  1.8*  2.0*  1.8*  1.8*  1.8*   < >   --    AST  101*  72*  68*  44  29  34   < >   --    ALT  58  44  36  26  22  27   < >   --    LDH   --    --    --    --    --    --    --   289*   GGT   --    --    --    --    --   58   --    --     < > = values in this interval not displayed.       Pancreatitis testing    Recent Labs   Lab Test  07/17/17   0630  07/12/17   0342  07/10/17   0340  07/05/17   0346  03/05/17   0358  03/03/17   1458  02/21/17   1520  12/09/16   1159  02/08/16   1144  07/03/12   0710  09/30/10   1734   AMYLASE   --    --    --    --   53  70   --    --    --    --   82   LIPASE   --    --    --    --   216   --   326  161   --    --   138   TRIG  168*  114  177*  174*   --    --    --    --   99  182*   --        Hematology Studies      Recent Labs   Lab Test  07/17/17    0630  07/16/17   0326  07/15/17   0627  07/14/17   0349  07/13/17   1536  07/13/17   0344   WBC  5.9  5.4  5.2  6.0  7.1  7.7   ANEU  4.1  3.4  3.8  4.9  6.0  6.4   ALYM  1.5  1.6  1.1  0.9  0.5*  0.8   ZINA  0.2  0.2  0.1  0.1  0.4  0.3   AEOS  0.1  0.1  0.1  0.1  0.1  0.1   HGB  7.7*  7.4*  7.9*  8.4*  7.8*  7.6*   HCT  24.4*  24.4*  25.5*  26.5*  24.3*  24.1*   PLT  160  141*  141*  115*  96*  100*       Arterial Blood Gas Testing    Recent Labs   Lab Test  07/08/17   0902  07/06/17   2334  07/06/17   0015  07/05/17   2026  07/05/17   1810   PH  7.32*  7.26*  7.27*  7.27*  7.28*   PCO2  35  37  35  35  37   PO2  96  85  109*  155*  188*   HCO3  18*  17*  16*  16*  17*   O2PER  30  30.0  30.0  40  50.0        Urine Studies     Recent Labs   Lab Test  07/11/17   1105  07/06/17   1441  06/06/17   1605  03/24/17   1315  03/16/17   1010   URINEPH  5.0  5.0  5.0  5.5  5.0   NITRITE  Negative  Negative  Negative  Negative  Negative   LEUKEST  Negative  Trace*  Negative  Negative  Negative   WBCU  <1  9*  1  1  5*       Vancomycin Levels     Recent Labs   Lab Test  07/06/17   0316  10/28/16   1405   VANCOMYCIN  22.1  14.4       Tacrolimus levels    Invalid input(s): TACROLIMUS, TAC, TACR  Transplant Immunosuppression Labs Latest Ref Rng & Units 7/17/2017 7/16/2017 7/16/2017 7/15/2017 7/14/2017   Tacro Level 5.0 - 15.0 ug/L - - - 5.2 -   Tacro Level - - - - Not Provided -   Creat 0.66 - 1.25 mg/dL 1.55(H) 1.57(H) 1.66(H) 1.56(H) 1.74(H)   BUN 7 - 30 mg/dL 66(H) 65(H) 71(H) 73(H) 84(H)   WBC 4.0 - 11.0 10e9/L 5.9 - 5.4 5.2 6.0   Neutrophil % 69.4 - 63.8 73.8 80.5   ANEU 1.6 - 8.3 10e9/L 4.1 - 3.4 3.8 4.9       CSF testing     Recent Labs   Lab Test  06/11/09   0225   CWBC  1   CRBC  2   CGLU  104*   CTP  54         Microbiology:  Ascites  7/1714/2017 negative to date.     7/5/2017 S acpitis    BAL   7/15/2017 yeast and Rhinovirus     7/12/2017 single colony of VRE, C albicans/dubliniensis   Sputum  7/11/2017 VRE and Coag  Neg Staph     Bcx   7/15/2017 negative to date.     Last check of C difficile  C Diff Toxin B PCR   Date Value Ref Range Status   10/27/2016  NEG Final    Negative  Negative: Clostridium difficile target DNA sequences NOT detected, presumed   negative for Clostridium difficile toxin B or the number of bacteria present   may be below the limit of detection for the test.   FDA approved assay performed using [employer] GeneXpert real-time PCR.   A negative result does not exclude actual disease due to Clostridium difficile   and may be due to improper collection, handling and storage of the specimen or   the number of organisms in the specimen is below the detection limit of the   assay.         Virology:  CMV viral loads    Recent Labs   Lab Test  07/15/17   1553  07/10/17   0340  07/03/17   1032  06/26/17   1038   CSPEC  Bronchoalveolar Lavage  EDTA PLASMA  Plasma, EDTA anticoagulant  Plasma  CORRECTED ON 06/26 AT 1421: PREVIOUSLY REPORTED AS WB EDTA     CMVLOG  3.6*  2.2*  <2.1  Not Calculated       CMV viral loads    Log IU/mL of CMVQNT   Date Value Ref Range Status   07/15/2017 3.6 (H) <2.1 [Log_IU]/mL Final   07/10/2017 2.2 (H) <2.1 [Log_IU]/mL Final   07/03/2017 <2.1 <2.1 [Log_IU]/mL Final   06/26/2017 Not Calculated <2.1 [Log_IU]/mL Final       CMV resistance testing  No lab results found.  No results found for: CMVCID, CMVFOS, CMVGAN    USCNS Invalid input(s): USCN, USCNS    USCNS No components found for: USCN, USCNS      No results found for: H6RES    No results found for: EBVDN, EBRES, EBVDN, EBVSP, EBVPC, EBVPCR      Imaging:  CT c/a/p 7/13/2017 Reviewed by myself.   IMPRESSION:   1. Improved moderate right-sided pleural effusion and resolution of  left-sided pleural effusion. There remain large areas of  atelectasis/consolidation in both lungs, including in the left lower  lobe despite resolution of left-sided pleural effusion. Superimposed  infectious process cannot be excluded.  2. Moderate-large amount of  ascites increased from 7/8/2017, which  makes it difficult to evaluate for the presence or absence of  inflammatory change or infectious process in the abdomen or pelvis. No  intra-abdominal abscess.  3. Stable small presumed hematoma within the ventral abdominal wall  fat.  4. Splenomegaly.        Naseem Figueroa  Pager: [phone]

## 2017-07-18 ENCOUNTER — APPOINTMENT (OUTPATIENT)
Dept: PHYSICAL THERAPY | Facility: CLINIC | Age: 52
End: 2017-07-18
Attending: PHYSICIAN ASSISTANT
Payer: COMMERCIAL

## 2017-07-18 ENCOUNTER — APPOINTMENT (OUTPATIENT)
Dept: OCCUPATIONAL THERAPY | Facility: CLINIC | Age: 52
End: 2017-07-18
Attending: TRANSPLANT SURGERY
Payer: COMMERCIAL

## 2017-07-18 ENCOUNTER — APPOINTMENT (OUTPATIENT)
Dept: SPEECH THERAPY | Facility: CLINIC | Age: 52
End: 2017-07-18
Attending: TRANSPLANT SURGERY
Payer: COMMERCIAL

## 2017-07-18 ENCOUNTER — APPOINTMENT (OUTPATIENT)
Dept: GENERAL RADIOLOGY | Facility: CLINIC | Age: 52
End: 2017-07-18
Attending: TRANSPLANT SURGERY
Payer: COMMERCIAL

## 2017-07-18 ENCOUNTER — ALLIED HEALTH/NURSE VISIT (OUTPATIENT)
Dept: NEUROLOGY | Facility: CLINIC | Age: 52
End: 2017-07-18
Attending: PSYCHIATRY & NEUROLOGY
Payer: COMMERCIAL

## 2017-07-18 DIAGNOSIS — G93.40 ENCEPHALOPATHY: Primary | ICD-10-CM

## 2017-07-18 LAB
ABO + RH BLD: NORMAL
ABO + RH BLD: NORMAL
ALBUMIN SERPL-MCNC: 1.9 G/DL (ref 3.4–5)
ALP SERPL-CCNC: 171 U/L (ref 40–150)
ALT SERPL W P-5'-P-CCNC: 61 U/L (ref 0–70)
ANION GAP SERPL CALCULATED.3IONS-SCNC: 5 MMOL/L (ref 3–14)
AST SERPL W P-5'-P-CCNC: 88 U/L (ref 0–45)
BASOPHILS # BLD AUTO: 0 10E9/L (ref 0–0.2)
BASOPHILS NFR BLD AUTO: 0.3 %
BILIRUB DIRECT SERPL-MCNC: 0.2 MG/DL (ref 0–0.2)
BILIRUB SERPL-MCNC: 0.6 MG/DL (ref 0.2–1.3)
BLD GP AB SCN SERPL QL: NORMAL
BLD PROD TYP BPU: NORMAL
BLD PROD TYP BPU: NORMAL
BLD UNIT ID BPU: 0
BLOOD BANK CMNT PATIENT-IMP: NORMAL
BLOOD PRODUCT CODE: NORMAL
BPU ID: NORMAL
BUN SERPL-MCNC: 63 MG/DL (ref 7–30)
CALCIUM SERPL-MCNC: 7.7 MG/DL (ref 8.5–10.1)
CHLORIDE SERPL-SCNC: 113 MMOL/L (ref 94–109)
CO2 SERPL-SCNC: 24 MMOL/L (ref 20–32)
COPATH REPORT: NORMAL
CREAT SERPL-MCNC: 1.46 MG/DL (ref 0.66–1.25)
DIFFERENTIAL METHOD BLD: ABNORMAL
EOSINOPHIL # BLD AUTO: 0.1 10E9/L (ref 0–0.7)
EOSINOPHIL NFR BLD AUTO: 1.7 %
ERYTHROCYTE [DISTWIDTH] IN BLOOD BY AUTOMATED COUNT: 14.4 % (ref 10–15)
GFR SERPL CREATININE-BSD FRML MDRD: 51 ML/MIN/1.7M2
GLUCOSE BLDC GLUCOMTR-MCNC: 104 MG/DL (ref 70–99)
GLUCOSE BLDC GLUCOMTR-MCNC: 118 MG/DL (ref 70–99)
GLUCOSE BLDC GLUCOMTR-MCNC: 121 MG/DL (ref 70–99)
GLUCOSE BLDC GLUCOMTR-MCNC: 123 MG/DL (ref 70–99)
GLUCOSE BLDC GLUCOMTR-MCNC: 131 MG/DL (ref 70–99)
GLUCOSE BLDC GLUCOMTR-MCNC: 132 MG/DL (ref 70–99)
GLUCOSE BLDC GLUCOMTR-MCNC: 133 MG/DL (ref 70–99)
GLUCOSE BLDC GLUCOMTR-MCNC: 133 MG/DL (ref 70–99)
GLUCOSE BLDC GLUCOMTR-MCNC: 139 MG/DL (ref 70–99)
GLUCOSE BLDC GLUCOMTR-MCNC: 140 MG/DL (ref 70–99)
GLUCOSE BLDC GLUCOMTR-MCNC: 140 MG/DL (ref 70–99)
GLUCOSE BLDC GLUCOMTR-MCNC: 141 MG/DL (ref 70–99)
GLUCOSE BLDC GLUCOMTR-MCNC: 144 MG/DL (ref 70–99)
GLUCOSE BLDC GLUCOMTR-MCNC: 145 MG/DL (ref 70–99)
GLUCOSE BLDC GLUCOMTR-MCNC: 146 MG/DL (ref 70–99)
GLUCOSE BLDC GLUCOMTR-MCNC: 146 MG/DL (ref 70–99)
GLUCOSE BLDC GLUCOMTR-MCNC: 153 MG/DL (ref 70–99)
GLUCOSE BLDC GLUCOMTR-MCNC: 159 MG/DL (ref 70–99)
GLUCOSE BLDC GLUCOMTR-MCNC: 160 MG/DL (ref 70–99)
GLUCOSE BLDC GLUCOMTR-MCNC: 163 MG/DL (ref 70–99)
GLUCOSE BLDC GLUCOMTR-MCNC: 171 MG/DL (ref 70–99)
GLUCOSE BLDC GLUCOMTR-MCNC: 86 MG/DL (ref 70–99)
GLUCOSE BLDC GLUCOMTR-MCNC: 91 MG/DL (ref 70–99)
GLUCOSE SERPL-MCNC: 131 MG/DL (ref 70–99)
HCT VFR BLD AUTO: 21.2 % (ref 40–53)
HGB BLD-MCNC: 6.5 G/DL (ref 13.3–17.7)
HGB BLD-MCNC: 6.8 G/DL (ref 13.3–17.7)
IMM GRANULOCYTES # BLD: 0 10E9/L (ref 0–0.4)
IMM GRANULOCYTES NFR BLD: 0.3 %
LYMPHOCYTES # BLD AUTO: 1.7 10E9/L (ref 0.8–5.3)
LYMPHOCYTES NFR BLD AUTO: 27.4 %
MCH RBC QN AUTO: 27.9 PG (ref 26.5–33)
MCHC RBC AUTO-ENTMCNC: 32.1 G/DL (ref 31.5–36.5)
MCV RBC AUTO: 87 FL (ref 78–100)
MONOCYTES # BLD AUTO: 0.2 10E9/L (ref 0–1.3)
MONOCYTES NFR BLD AUTO: 3.8 %
NEUTROPHILS # BLD AUTO: 4.2 10E9/L (ref 1.6–8.3)
NEUTROPHILS NFR BLD AUTO: 66.5 %
NRBC # BLD AUTO: 0 10*3/UL
NRBC BLD AUTO-RTO: 0 /100
NUM BPU REQUESTED: 1
PLATELET # BLD AUTO: 151 10E9/L (ref 150–450)
POTASSIUM SERPL-SCNC: 5.2 MMOL/L (ref 3.4–5.3)
PROT SERPL-MCNC: 5.4 G/DL (ref 6.8–8.8)
RBC # BLD AUTO: 2.44 10E12/L (ref 4.4–5.9)
SODIUM SERPL-SCNC: 142 MMOL/L (ref 133–144)
SPECIMEN EXP DATE BLD: NORMAL
TRANSFUSION STATUS PATIENT QL: NORMAL
TRANSFUSION STATUS PATIENT QL: NORMAL
WBC # BLD AUTO: 6.3 10E9/L (ref 4–11)

## 2017-07-18 PROCEDURE — 25000132 ZZH RX MED GY IP 250 OP 250 PS 637: Performed by: STUDENT IN AN ORGANIZED HEALTH CARE EDUCATION/TRAINING PROGRAM

## 2017-07-18 PROCEDURE — 86850 RBC ANTIBODY SCREEN: CPT | Performed by: TRANSPLANT SURGERY

## 2017-07-18 PROCEDURE — 00000146 ZZHCL STATISTIC GLUCOSE BY METER IP

## 2017-07-18 PROCEDURE — 95951 ZZHC EEG VIDEO < 12 HR: CPT | Mod: 52,ZF

## 2017-07-18 PROCEDURE — 25000125 ZZHC RX 250

## 2017-07-18 PROCEDURE — 25000132 ZZH RX MED GY IP 250 OP 250 PS 637: Performed by: PHYSICIAN ASSISTANT

## 2017-07-18 PROCEDURE — 99211 OFF/OP EST MAY X REQ PHY/QHP: CPT

## 2017-07-18 PROCEDURE — 92526 ORAL FUNCTION THERAPY: CPT | Mod: GN | Performed by: SPEECH-LANGUAGE PATHOLOGIST

## 2017-07-18 PROCEDURE — 40000141 ZZH STATISTIC PERIPHERAL IV START W/O US GUIDANCE

## 2017-07-18 PROCEDURE — 25000132 ZZH RX MED GY IP 250 OP 250 PS 637: Performed by: NURSE PRACTITIONER

## 2017-07-18 PROCEDURE — 85018 HEMOGLOBIN: CPT | Performed by: PHYSICIAN ASSISTANT

## 2017-07-18 PROCEDURE — 40000274 ZZH STATISTIC RCP CONSULT EA 30 MIN

## 2017-07-18 PROCEDURE — 97530 THERAPEUTIC ACTIVITIES: CPT | Mod: GP

## 2017-07-18 PROCEDURE — 86900 BLOOD TYPING SEROLOGIC ABO: CPT | Performed by: TRANSPLANT SURGERY

## 2017-07-18 PROCEDURE — 12000025 ZZH R&B TRANSPLANT INTERMEDIATE

## 2017-07-18 PROCEDURE — P9016 RBC LEUKOCYTES REDUCED: HCPCS | Performed by: TRANSPLANT SURGERY

## 2017-07-18 PROCEDURE — 25000128 H RX IP 250 OP 636: Performed by: PHYSICIAN ASSISTANT

## 2017-07-18 PROCEDURE — 25000128 H RX IP 250 OP 636: Performed by: STUDENT IN AN ORGANIZED HEALTH CARE EDUCATION/TRAINING PROGRAM

## 2017-07-18 PROCEDURE — 40000275 ZZH STATISTIC RCP TIME EA 10 MIN

## 2017-07-18 PROCEDURE — 85025 COMPLETE CBC W/AUTO DIFF WBC: CPT | Performed by: PHYSICIAN ASSISTANT

## 2017-07-18 PROCEDURE — 87799 DETECT AGENT NOS DNA QUANT: CPT | Performed by: PHYSICIAN ASSISTANT

## 2017-07-18 PROCEDURE — 25000128 H RX IP 250 OP 636: Performed by: TRANSPLANT SURGERY

## 2017-07-18 PROCEDURE — 86901 BLOOD TYPING SEROLOGIC RH(D): CPT | Performed by: TRANSPLANT SURGERY

## 2017-07-18 PROCEDURE — 94640 AIRWAY INHALATION TREATMENT: CPT | Mod: 76

## 2017-07-18 PROCEDURE — 25000132 ZZH RX MED GY IP 250 OP 250 PS 637: Performed by: TRANSPLANT SURGERY

## 2017-07-18 PROCEDURE — 25000125 ZZHC RX 250: Performed by: PHYSICIAN ASSISTANT

## 2017-07-18 PROCEDURE — 80048 BASIC METABOLIC PNL TOTAL CA: CPT | Performed by: PHYSICIAN ASSISTANT

## 2017-07-18 PROCEDURE — 40000193 ZZH STATISTIC PT WARD VISIT

## 2017-07-18 PROCEDURE — 25000131 ZZH RX MED GY IP 250 OP 636 PS 637: Performed by: PHYSICIAN ASSISTANT

## 2017-07-18 PROCEDURE — 36415 COLL VENOUS BLD VENIPUNCTURE: CPT | Performed by: PHYSICIAN ASSISTANT

## 2017-07-18 PROCEDURE — 25000132 ZZH RX MED GY IP 250 OP 250 PS 637: Performed by: SURGERY

## 2017-07-18 PROCEDURE — 97163 PT EVAL HIGH COMPLEX 45 MIN: CPT | Mod: GP

## 2017-07-18 PROCEDURE — 97110 THERAPEUTIC EXERCISES: CPT | Mod: GP

## 2017-07-18 PROCEDURE — 86923 COMPATIBILITY TEST ELECTRIC: CPT | Performed by: TRANSPLANT SURGERY

## 2017-07-18 PROCEDURE — 80076 HEPATIC FUNCTION PANEL: CPT | Performed by: PHYSICIAN ASSISTANT

## 2017-07-18 PROCEDURE — 40000133 ZZH STATISTIC OT WARD VISIT

## 2017-07-18 PROCEDURE — 94640 AIRWAY INHALATION TREATMENT: CPT

## 2017-07-18 PROCEDURE — 40000225 ZZH STATISTIC SLP WARD VISIT: Performed by: SPEECH-LANGUAGE PATHOLOGIST

## 2017-07-18 PROCEDURE — 97535 SELF CARE MNGMENT TRAINING: CPT | Mod: GO

## 2017-07-18 PROCEDURE — 71010 XR CHEST PORT 1 VW: CPT

## 2017-07-18 RX ORDER — FUROSEMIDE 10 MG/ML
40 INJECTION INTRAMUSCULAR; INTRAVENOUS ONCE
Status: COMPLETED | OUTPATIENT
Start: 2017-07-18 | End: 2017-07-18

## 2017-07-18 RX ORDER — FLUDROCORTISONE ACETATE 0.1 MG/1
0.1 TABLET ORAL DAILY
Status: DISCONTINUED | OUTPATIENT
Start: 2017-07-19 | End: 2017-07-19

## 2017-07-18 RX ORDER — ALBUTEROL SULFATE 0.83 MG/ML
SOLUTION RESPIRATORY (INHALATION)
Status: COMPLETED
Start: 2017-07-18 | End: 2017-07-18

## 2017-07-18 RX ORDER — VALGANCICLOVIR HYDROCHLORIDE 50 MG/ML
450 POWDER, FOR SOLUTION ORAL DAILY
Status: DISCONTINUED | OUTPATIENT
Start: 2017-07-18 | End: 2017-07-19

## 2017-07-18 RX ORDER — CYCLOBENZAPRINE HCL 10 MG
10 TABLET ORAL AT BEDTIME
Status: DISCONTINUED | OUTPATIENT
Start: 2017-07-18 | End: 2017-07-25

## 2017-07-18 RX ORDER — ACETYLCYSTEINE 200 MG/ML
2 SOLUTION ORAL; RESPIRATORY (INHALATION) EVERY 6 HOURS
Status: DISPENSED | OUTPATIENT
Start: 2017-07-18 | End: 2017-07-19

## 2017-07-18 RX ORDER — IPRATROPIUM BROMIDE AND ALBUTEROL SULFATE 2.5; .5 MG/3ML; MG/3ML
3 SOLUTION RESPIRATORY (INHALATION)
Status: DISCONTINUED | OUTPATIENT
Start: 2017-07-18 | End: 2017-07-18

## 2017-07-18 RX ORDER — LIDOCAINE 50 MG/G
1-2 PATCH TOPICAL
Status: DISCONTINUED | OUTPATIENT
Start: 2017-07-18 | End: 2017-07-25

## 2017-07-18 RX ORDER — ALBUTEROL SULFATE 0.83 MG/ML
2.5 SOLUTION RESPIRATORY (INHALATION)
Status: DISPENSED | OUTPATIENT
Start: 2017-07-18 | End: 2017-07-19

## 2017-07-18 RX ORDER — OXYCODONE HCL 5 MG/5 ML
5-10 SOLUTION, ORAL ORAL EVERY 6 HOURS PRN
Status: DISCONTINUED | OUTPATIENT
Start: 2017-07-18 | End: 2017-07-25

## 2017-07-18 RX ADMIN — CYCLOBENZAPRINE HYDROCHLORIDE 10 MG: 10 TABLET, FILM COATED ORAL at 21:57

## 2017-07-18 RX ADMIN — TACROLIMUS 7 MG: 5 CAPSULE ORAL at 08:13

## 2017-07-18 RX ADMIN — HEPARIN SODIUM 5000 UNITS: 5000 INJECTION, SOLUTION INTRAVENOUS; SUBCUTANEOUS at 20:08

## 2017-07-18 RX ADMIN — VALGANCICLOVIR HYDROCHLORIDE 450 MG: 50 POWDER, FOR SOLUTION ORAL at 10:36

## 2017-07-18 RX ADMIN — HEPARIN SODIUM 5000 UNITS: 5000 INJECTION, SOLUTION INTRAVENOUS; SUBCUTANEOUS at 04:01

## 2017-07-18 RX ADMIN — FUROSEMIDE 40 MG: 10 INJECTION, SOLUTION INTRAVENOUS at 16:20

## 2017-07-18 RX ADMIN — FUROSEMIDE 40 MG: 10 INJECTION, SOLUTION INTRAVENOUS at 20:44

## 2017-07-18 RX ADMIN — HUMAN INSULIN 4 UNITS/HR: 100 INJECTION, SOLUTION SUBCUTANEOUS at 16:20

## 2017-07-18 RX ADMIN — Medication 80 MG: at 10:36

## 2017-07-18 RX ADMIN — Medication 0.2 MG: at 20:08

## 2017-07-18 RX ADMIN — AMLODIPINE BESYLATE 5 MG: 10 TABLET ORAL at 08:13

## 2017-07-18 RX ADMIN — FUROSEMIDE 40 MG: 10 INJECTION, SOLUTION INTRAVENOUS at 08:05

## 2017-07-18 RX ADMIN — OXYCODONE HYDROCHLORIDE 5 MG: 5 TABLET ORAL at 06:11

## 2017-07-18 RX ADMIN — IPRATROPIUM BROMIDE AND ALBUTEROL SULFATE 3 ML: .5; 3 SOLUTION RESPIRATORY (INHALATION) at 11:31

## 2017-07-18 RX ADMIN — ALBUTEROL SULFATE 2.5 MG: 2.5 SOLUTION RESPIRATORY (INHALATION) at 05:07

## 2017-07-18 RX ADMIN — PANTOPRAZOLE SODIUM 40 MG: 40 TABLET, DELAYED RELEASE ORAL at 08:13

## 2017-07-18 RX ADMIN — OXYCODONE HYDROCHLORIDE 5 MG: 5 SOLUTION ORAL at 17:06

## 2017-07-18 RX ADMIN — OXYCODONE HYDROCHLORIDE 10 MG: 5 TABLET ORAL at 00:22

## 2017-07-18 RX ADMIN — MULTIVIT AND MINERALS-FERROUS GLUCONATE 9 MG IRON/15 ML ORAL LIQUID 15 ML: at 08:14

## 2017-07-18 RX ADMIN — OXYCODONE HYDROCHLORIDE 5 MG: 5 SOLUTION ORAL at 11:25

## 2017-07-18 RX ADMIN — ONDANSETRON 4 MG: 2 INJECTION INTRAMUSCULAR; INTRAVENOUS at 06:14

## 2017-07-18 RX ADMIN — TACROLIMUS 7 MG: 5 CAPSULE ORAL at 17:59

## 2017-07-18 RX ADMIN — LIDOCAINE 1 PATCH: 50 PATCH CUTANEOUS at 20:08

## 2017-07-18 RX ADMIN — IPRATROPIUM BROMIDE AND ALBUTEROL SULFATE 3 ML: .5; 3 SOLUTION RESPIRATORY (INHALATION) at 07:35

## 2017-07-18 RX ADMIN — Medication 0.2 MG: at 08:13

## 2017-07-18 RX ADMIN — HEPARIN SODIUM 5000 UNITS: 5000 INJECTION, SOLUTION INTRAVENOUS; SUBCUTANEOUS at 11:30

## 2017-07-18 ASSESSMENT — PAIN DESCRIPTION - DESCRIPTORS
DESCRIPTORS: ACHING
DESCRIPTORS: ACHING;CONSTANT
DESCRIPTORS: ACHING
DESCRIPTORS: ACHING

## 2017-07-18 NOTE — PROGRESS NOTES
Surgery Cross-Cover Note  7/18/2017 5:24 AM     Was paged regarding difficulty in breathing.  Patient has no CP, felt SOB but after nebs it became better     /87 (BP Location: Right arm)  Pulse 93  Temp 98.5  F (36.9  C) (Oral)  Resp 20  Ht 1.829 m (6')  Wt 93.2 kg (205 lb 7.5 oz)  SpO2 97%  BMI 27.87 kg/m2    I/O last 3 completed shifts:  In: 6698.84 [I.V.:2213.84; NG/GT:2565]  Out: 2400 [Urine:2250; Emesis/NG output:150]    Exam:  Alert, comfortable and communicative  Chest: generalized wheeziness, poor inspiratory effort,      Assessment/Plan:  SOB due to increased pulmonary secretions, need to rule out fluid overload  - For nebs  - CXR portable stat    Cuca Pedersen MD  PGY-1 General Surgery  Pager # [phone]    (Please page the morning team after 6 am)

## 2017-07-18 NOTE — PLAN OF CARE
Problem: Goal Outcome Summary  Goal: Goal Outcome Summary  Outcome: No Change  Patient was transferred up to unit 7A at 1630 this evening. T-max 100.3. Other VSS on 2L of oxygen. Patient received oxycodone x1 for abdominal pain. He denies nausea. He continues to be NPO. Crowe is patent and draining. He had a bowel movement x1 this evening. Patient received tylenol x1 for fever. Tube feeds continue at 80cc/hr with 100cc water flushes every hour via his NJ tube. Patient continues on an insulin drip. Blood sugars have been between 117 and 172. Patient continues on EEG. He continues to pull at his lines so continues to have the mitts on. Patient's NJ tube was discontinued this evening per order from Felicitas Driver PA-C. Will continue to monitor and update the team as needed.

## 2017-07-18 NOTE — PLAN OF CARE
Problem: Goal Outcome Summary  Goal: Goal Outcome Summary  SLP: Pt seen for dysphagia f/u when positioned upright in bedside chair.  Session shortened as pt limited by pain.  Pt impulsive throughout trials.  Recommend continue full liquid diet, nectar thick consistencies with supervision/assist for intake.  Pt will need to cues and prompts to take small bites/sips and pace self.  ST to follow.

## 2017-07-18 NOTE — PROCEDURES
VIDEO EEG DAY # 1    VIDEO EEG DATE: July 17, 2017    VIDEO EEG #: -1    VIDEO EEG SOURCE FILE DURATION: 10 hours and 54 minutes     PATIENT INFORMATION: Camacho Bhagat is a 51  year old male who presents with multiple medication issues, patient has altered mental status. EEG is being done to evaluate for seizures.     MEDICATIONS: No antiepileptic drug. Patient did receive oxycodone    TECHNICAL SUMMARY: This video EEG monitoring procedure was performed with 23 scalp electrodes in 10-20 system placements, and additional scalp, precordial and other surface electrodes used for electrical referencing and artifact detection. No electroclinical seizures or any electrographic seizures were seen. Video was reviewed intermittently by EEG technologist and physcian for clinical seizures.     BACKGROUND ACTIVITY:  During wakefulness, the background activity consists of diffuse theta slowing that is synchronous , he has a slow 6-7 Hz posterior dominant rhythm that attenuated with eye opening. Patient did not fall asleep and sleep architecture was not seen.    No focal abnormalities were observed.    ACTIVATION PROCEDURE:  Photic stimulation and hyperventilation were not done. During baseline testing patient is asked to count and he had difficulty completing this task. He was not able to answer orientation questions.     EPILEPTIFORM DISCHARGES: No epileptiform activity was recorded.    ICTAL:  No clinical events or electrographic seizures were recorded. . Video was reviewed intermittently by EEG technologist and physcian for clinical seizures.     IMPRESSION OF VIDEO EEG DAY #1: This video electroencephalogram is abnormal due to the presences of generalized continuous theta slowing and background slowing consistent with a moderate encephalopathy.  No electrographic seizure, paroxsymal behaviors, or epileptiform discharges were seen.     NIOCLLE HARRIS MD

## 2017-07-18 NOTE — PROGRESS NOTES
Immunosuppression Note:    Camacho Bhagat is a 51 year old male who is seen today  for immunosuppression management     I, Jovan Tran MD, I have examined the patient with the resident/PA/Fellow, discussed and agree with the note and findings.  I have reviewed today's vital signs, medications, labs and imaging. I reviewed the immunosuppression medications and levels. 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     Graft Function: Good function. US liver normal doppler. Slight increase in Alk phos and AST.   -Tac dose increased.   Immunosuppression Management:   CellCept 250 mg BID. Held since 6/27/17 due to neutropenia. Continue to hold.   Tac goal level 5-8. Since MMF held will aim for goal ~8. Prograf 6 mg BID, increased yesterday AM. 7/17 Level 5.1, 12.5 hr trough. Titrating up dose over past few days, poor absorption.  Increase dose to 7 mg BID, check level every 48hrs, level to be done tomorrow.  Cardiorespiratory: Intubated for ex lap, initially requiring pressor support. HDS. Moderate right sided pleural effusion. Chest tube placed 7/9 that was removed 7/12. HDS.   -Difficult to have patient do aggressive pulmonary toilet. Albuterol/Mucomyst neb x 4 doses and start acapella. Patient to be OOB to chair as much as possible.   -CXR- pulmonary edema. RR increased. Lasix 40 mg IV x 2 doses.   Hematology: Pancytopenia, acute on chronic, secondary to medications. MMF and bactrim held (since 6/27). Valcyte held on  admission. Neupogen 480 mcg given on 7/4 to 7/7,  CMV seronegative and donor seropositive.   -7/3 CMV PCR < 137. 7/10 CMV  7/15 CMV PCR 4220 copies. Valcyte ppx dose restarted. Continue to hold MMF and bactrim.   -Anemia- 6.8, trending down. Will check DERRELL given spherocytes on P smear. Recheck Hbg after lasix given. If < 7 will transfuse one unit RBCs.   -Cytology ascites fluid, negative for malignancy   GI: Neutropenic colitis.   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 ID recommending possible colonoscopy, will wait for CMV PCR, it was not run this AM. Will be done tomorrow.   -NJ placed. TF Peptamen titrated at goal.   -C diff negative.  -Start nectar thick full liquids. Aspiration precautions. SLP following.    Fluid/Electrolytes/Renal:  EJ, secondary to hypoperfusion, sepsis. Cr 1.5 (1.7). BUN trending down. Good UO. HypoNa, resolved. Decrease free water to maintenance rate. Hyperkalemia, PTA on florinef- HOLD today due to lasix. K stable. Avoid nephrotoxins.   Endocrine: DM II, on insulin gtt. Endocrine consulted to transition to subQ insulin.  Infectious disease: Started on Zosyn, Vancomycin, Flagyl, Micafungin on admission (7/4/2017). 7/5 Blood cultures, no growth thus far. Abdominal fluid culture collected intraoperatively, gram stain with no organisms, fungal and bacterial culture, GPCs. Bronchoalveolar lavage growing VRE, colonization. Tx ID consulted.  -Fever > 101 for several days, WBC 6.3, stable. 7/13 CT scan negative for abscess, large amount of ascites, mildly prominent lymph nodes-no significant change. 7/15 BAL cx and 7/14 ascites cx unremarkable per ID. Ascitic fluid exudate. Continue ertapenem x 14 days. Check EBV PCR. Recheck CMV PCR. If CMV PCR low copies would consider colonoscopy to eval for CMV colitis and if CMV PCR increased copies would start treatment with IV ganciclovir.   -T max 24 hrs- 100.3  -CMV viremia - CMV D+R-, low viral count. IS reduced (MMF held). Valcyte 450 daily restarted.    -Possible drug fever. Seroquel stopped due to this  Neuro: Delirium, hold, limit medications that have CNS SE.  Neurology consulted- dx likely toxic metabolic encephalopathy, improving slowly. VideoEEG showed mild to moderate encephalopathy. Assist patient with sleep hygiene.   Pain: General discomfort, chronic back pain. Patient very restless. Lidoderm patch for back pain.  Oxycodone 5 mg PRN. Restart PTA flexeril qHS   Vascular: Right Arterial line clot 2/2 previous   art line. Vascular consulted. Recommend ASA only. Some evidence of microvascular injury in digits (toes) this is most likely due to injury while patient was on pressor therapy and sepsis.   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   Activity: Daily PT/OT  Prophylaxis: PPI, DVT-SCD  Disposition: 7A.    Medical Decision Making: Medium  Admit 07811 (moderate level decision making)    PILLO/Fellow/Resident Provider: Ada Driver PA-C [id number]    Faculty: Jovan Tran M.D.      __________________________________________________________________  Transplant History:.  3/4/2017 DD OLT with Dr. Tran (Liver), Postoperative day: 136     Interval History: History is obtained from EMR and nursing staff.  Overnight events: No acute events o/n. General discomfort, mostly back pain. Difficult to have pt perform pulmonary toilet without assistance.      ROS:   A 10-point review of systems was negative except as noted above.    Meds:    ipratropium - albuterol 0.5 mg/2.5 mg/3 mL  3 mL Nebulization 4x daily     aspirin  80 mg Oral Daily     valGANciclovir  450 mg Oral Daily     lidocaine  1-2 patch Transdermal Q24h    And     [START ON 7/19/2017] lidocaine   Transdermal Q24H    And     lidocaine   Transdermal Q8H     insulin aspart   Subcutaneous TID w/meals     tacrolimus  7 mg Oral BID IS     cloNIDine  0.2 mg Oral BID     amLODIPine  5 mg Oral Daily     multivitamins with minerals  15 mL Per Feeding Tube Daily     heparin  5,000  Units Subcutaneous Q8H     lidocaine (viscous)  5 mL Topical Once     pantoprazole  40 mg Oral or Feeding Tube Daily     fludrocortisone  0.1 mg Oral Daily     sodium chloride (PF)  3 mL Intracatheter Q8H       Physical Exam:     Admit Weight: 94.2 kg (207 lb 11.2 oz) (SCALE 2)    Current vitals:   /85 (BP Location: Right arm)  Pulse 95  Temp 98.2  F (36.8  C) (Oral)  Resp 20  Ht 1.829 m (6')  Wt 93.2 kg (205 lb 7.5 oz)  SpO2 95%  BMI 27.87 kg/m2    Vital sign ranges:    Temp:  [98.2  F (36.8  C)-100.3  F (37.9  C)] 98.2  F (36.8  C)  Pulse:  [90-99] 95  Heart Rate:  [88-97] 95  Resp:  [20-28] 20  BP: (128-167)/(70-90) 151/85  SpO2:  [86 %-100 %] 95 %  Patient Vitals for the past 24 hrs:   BP Temp Temp src Pulse Heart Rate Resp SpO2   07/18/17 1200 - - - - - - 95 %   07/18/17 1155 - - - - - - (!) 86 %   07/18/17 1145 151/85 98.2  F (36.8  C) Oral 95 - 20 95 %   07/18/17 0723 145/82 98.9  F (37.2  C) Oral 95 95 20 96 %   07/18/17 0507 - - - - 95 28 96 %   07/18/17 0311 160/87 98.5  F (36.9  C) Oral 93 93 20 97 %   07/18/17 0004 163/81 98.4  F (36.9  C) Oral 90 90 28 97 %   07/17/17 2102 167/90 100.3  F (37.9  C) Oral 99 - 26 97 %   07/17/17 1637 144/70 99  F (37.2  C) Oral 96 97 22 97 %   07/17/17 1400 128/72 - - - 88 21 100 %     General Appearance: NAD  Skin: normal, warm, dry  Heart: RRR  Lungs: B/l crackles, non labored on 2L NC. Weak cough.   Abdomen: The abdomen is soft, midline incision is c/d/i and covered. Chevron incision well healed.   : vazquez is present, yellow urine in bag.   Extremities:BLE trace edema.  Neurologic: alert, orientated x 1-2. No tremor. Minimal responses to questions.     Data:   CMP    Recent Labs  Lab 07/18/17  0624 07/17/17  0630  07/16/17  0326  07/14/17  1517 07/14/17  0349    142  < > 148*  < >  --  144   POTASSIUM 5.2 5.1  < > 5.1  < >  --  4.4   CHLORIDE 113* 114*  < > 123*  < >  --  116*   CO2 24 21  < > 22  < >  --  20   * 239*  < > 128*  < >  --   187*   BUN 63* 66*  < > 71*  < >  --  84*   CR 1.46* 1.55*  < > 1.66*  < >  --  1.74*   GFRESTIMATED 51* 47*  < > 44*  < >  --  41*   GFRESTBLACK 61 57*  < > 53*  < >  --  50*   JACOB 7.7* 7.8*  < > 7.8*  < >  --  8.0*   MAG  --   --   --  2.4*  --   --  2.2   PHOS  --   --   --  3.8  --   --  3.7   ALBUMIN 1.9* 1.9*  --  1.8*  --   --  2.0*   BILITOTAL 0.6 1.0  --  0.3  --   --  0.5   ALKPHOS 171* 144  --  134  --   --  171*   AST 88* 101*  --  72*  --   --  68*   ALT 61 58  --  44  --   --  36   FAMY  --   --   --   --   --  6  --    < > = values in this interval not displayed.  CBC    Recent Labs  Lab 07/18/17  0624 07/17/17  0630   HGB 6.8* 7.7*   WBC 6.3 5.9    160     COAGS    Recent Labs  Lab 07/17/17  0630   INR 1.19*      Urinalysis  Recent Labs   Lab Test  07/11/17   1105  07/06/17   1441  06/14/17   1508   04/11/16   1345   COLOR  Yellow  Yellow   --    < >  Yellow   APPEARANCE  Clear  Cloudy   --    < >  Clear   URINEGLC  Negative  Negative   --    < >  30*   URINEBILI  Negative  Negative   --    < >  Negative   URINEKETONE  Negative  Negative   --    < >  Negative   SG  1.009  1.014   --    < >  1.016   UBLD  Negative  Moderate*   --    < >  Small*   URINEPH  5.0  5.0   --    < >  5.0   PROTEIN  10*  30*   --    < >  30*   NITRITE  Negative  Negative   --    < >  Negative   LEUKEST  Negative  Trace*   --    < >  Negative   RBCU  <1  >182*   --    < >  1   WBCU  <1  9*   --    < >  1   UTPG   --    --   1.55*   --   0.41*    < > = values in this interval not displayed.       Cultures:   Blood Cultures x2 7/4/2017 NGTD     Abdominal Fluid Culture 7/4/2017: NGTD    Abdominal Fluid Culture 7/5/17: NGTD    Bronchoalveolar Culture 7/5/17: VRE.     Sputum endotracheal gm stain/culture 7/11/17: >25 PMNs/low power field   Few Mixed gram positive elvie    CT scan:    7/4/2017 CONCLUSION:   1. Right colonic wall thickening suggesting colitis. Follow-up is necessary to confirm resolution in order to exclude  colonic mass.  2. Transverse colon is not well distended reducing evaluation for wall thickening.  3. Small amount of ascites.  4. Splenomegaly.  5. Prominent portal and splenic veins. If confirmation of vascular patency is indicated consider follow-up ultrasound of the liver with Doppler.  6. Small right pleural effusion.  7. Stranding in the subcutaneous fat of the ventral pelvis.     Abdominal XR 7/4/2017:   1. No evidence of pneumoperitoneum.  2. Dilated loop of bowel in the central abdomen, likely sigmoid.    XR Chest Portable 1 View 7/4/17:  1. Endotracheal tube tip projects 5.3 cm from the chacorta.  2. Increased bilateral pleural effusions, right greater than left.  3. Unchanged bibasilar patchy opacities.

## 2017-07-18 NOTE — MR AVS SNAPSHOT
After Visit Summary   7/18/2017    Camacho Bhagat    MRN: [id number]           Patient Information     Date Of Birth          1965        Visit Information        Provider Department      7/18/2017 7:00 AM [facility] EEG TECH 4 [facility] EEG        Today's Diagnoses     Encephalopathy    -  1       Follow-ups after your visit        Your next 10 appointments already scheduled     Jul 28, 2017 11:15 AM CDT   (Arrive by 11:00 AM)   Return General Liver with Toñito Camejo MD   [facility] Hepatology ([facility])    [address] [place] [id number] [phone]            Sep 20, 2017  3:00 PM CDT   (Arrive by 2:30 PM)   New Patient Visit with Tl Higginbotham MD   [facility] Nephrology ([facility])    [address] [place] [id number] [phone]              Who to contact     Please call your clinic at [phone] to:    Ask questions about your health    Make or cancel appointments    Discuss your medicines    Learn about your test results    Speak to your doctor   If you have compliments or concerns about an experience at your clinic, or if you wish to file a complaint, please contact [facility] Patient Relations at [phone] or email us at [email or web address]         Additional Information About Your Visit        MyChart Information     [employer] gives you secure access to your electronic health record. If you see a primary care provider, you can also send messages to your care team and make appointments. If you have questions, please call your primary care clinic.  If you do not have a primary care provider, please call [phone] and they will assist you.      [employer] is an electronic gateway that provides easy, online access to your medical records. With [employer], you can request a clinic appointment, read your test results, renew a prescription or communicate with  your care team.     To access your existing account, please contact your [facility] or call [phone] for assistance.        Care EveryWhere ID     This is your Care EveryWhere ID. This could be used by other organizations to access your [facility] medical records  [id number]         Blood Pressure from Last 3 Encounters:   07/18/17 151/85   06/12/17 (!) 175/92   06/07/17 (!) 199/103    Weight from Last 3 Encounters:   07/17/17 93.2 kg (205 lb 7.5 oz)   06/07/17 98 kg (216 lb)   05/11/17 101.7 kg (224 lb 4.8 oz)              We Performed the Following     Glucose by meter     Glucose by meter     Glucose by meter     Glucose by meter     Glucose by meter     Glucose by meter          Today's Medication Changes      Notice     This visit is during an admission. Changes to the med list made in this visit will be reflected in the After Visit Summary of the admission.             Primary Care Provider Office Phone # Fax #    Shay Kirkpatrick [phone]       [address]  [facility] [address]        Equal Access to Services     [facility] Center[facility]ILY : Hadii tavo lawrence Sojose, waaxda alek, qaybta kaalmagy dolan, devi duckworth. So [facility] [phone].    ATENCIÓN: Si habla español, tiene a zheng disposición servicios gratuitos de asistencia lingüística. Shahla al [phone].    We comply with applicable federal civil rights laws and [place] laws. We do not discriminate on the basis of race, color, national origin, age, disability sex, sexual orientation or gender identity.            Thank you!     Thank you for choosing [facility]  for your care. Our goal is always to provide you with excellent care. Hearing back from our patients is one way we can continue to improve our services. Please take a few minutes to complete the written survey that you may receive in the mail after your visit with us. Thank you!              Your Updated Medication List - Protect others around you: Learn how to safely use, store and throw away your medicines at www.disposemymeds.org.      Notice     This visit is during an admission. Changes to the med list made in this visit will be reflected in the After Visit Summary of the admission.

## 2017-07-18 NOTE — PLAN OF CARE
Problem: Goal Outcome Summary  Goal: Goal Outcome Summary  OT/7A:  Max AX2 for supine to sit transfer; pt tends to resist movement.  Mod A for static EOB balance and pt presents with posterior lean; frequently tries to lay self back down in bed.  Total AX2 for placement of sling under pt once seated EOB and dependent transfer to recliner.  Pt able to adjust hips in chair with min A for comfort, but continues to appear uncomfortable/painful. Max A for oral cares 2/2 pt with poor active ROM in B UEs, difficulty reaching mouth.  Performs AAROM of B shoulders X10 reps to maintain joint integrity and prevent contractures.  Limited by cognition (max total cues to follow 1-step commands), strength, ROM, and pain.       REC:  TCU for continued therapy.

## 2017-07-18 NOTE — PLAN OF CARE
Problem: Goal Outcome Summary  Goal: Goal Outcome Summary  Outcome: Declining  Pt tachypneic and wheezy.  Breathing appears labored.  Restless, unable to sleep despite getting oxycodone.  Intern notified.  Neb given, CXR done.    Constantly repositioning pt to sit up in bed and encouraging coughing and deep breathing.  Oxygenating well on 2l/nc.    Tube feeds at 80cc/hr with 100cc/hr water flushes.  Blood sugars controlled on insulin gtt, algorithm 3.    Hgb 6.8 this am.  Ada Driver notified.

## 2017-07-18 NOTE — PROGRESS NOTES
CLINICAL NUTRITION SERVICES - REASSESSMENT NOTE     Nutrition Prescription      Malnutrition Status:    Non-severe malnutrition in the context of acute on chronic illness    Recommendations already ordered by Registered Dietitian (RD):    Modified regimen to Peptamen 1.5 @ 120 ml/hr x 16 hours (6pm to 10am) - provides     Add 1 pkt Prosource TF (protein modular) to help meet at least 1.5 g/kg protein daily    Trial Magic Cup supplements with meals (290 kcal, 9 grams protein)       EVALUATION OF THE PROGRESS TOWARD GOALS   Diet: Full Liquid, Nectar Thick (advanced today)    Nutrition Support: Peptamen 1.5 @ 80 ml/hr continuous  Intake: Has received a 7 day average of 1330 ml (69% goal volume) which provided patient with 1995 kcal and 90 grams protein (~60% pro needs, 80% kcal needs).    Free water: 100 ml q 1 hour free water flushes     NEW FINDINGS   K+ on higher end normal (5.2)    Weight down ~2# (~1%) over the last 2 weeks.      Improved kidney function.  Changed to full protein needs per post-liver transplant guidelines: 140-186  grams protein/day (1.5-2 grams of pro/kg)    MALNUTRITION  % Intake: Decreased intake does not meet criteria  % Weight Loss: Up to 1-2% in 1 week (non-severe)  Subcutaneous Fat Loss: Facial region and Thoracic/intercostal:  At least Mild  Muscle Loss: Temporal, Facial & jaw region, Thoracic region (clavicle, acromium bone, deltoid, trapezius, pectoral) and Upper leg (quadricep, hamstring):  Mild to Moderate  Fluid Accumulation/Edema: Mild (hands, feet/ankles/legs)  Malnutrition Diagnosis: Non-severe malnutrition in the context of acute on chronic illness.    Previous Goals   1.  Tolerate adv of TF to goal infusion and wean off CPN within the next 3 days - MET  2.  Total ave nutrition intakes (TF + CPN/IV lipids + D5 if continued) to provide minimum 80% MREE (equiv to 27 kcal/kg/day) and 1.2 g PRO/kg/day (per 93 kg). - NOT MET    Previous Nutrition Diagnosis  Inadequate energy  intake  Evaluation: Improving    CURRENT NUTRITION DIAGNOSIS  Inadequate protein-energy intake related to slow advancement of TF to goal, improving renal function as evidenced by patient meting 60-80% of nutritional needs on average over the last 7 days.     INTERVENTIONS  Implementation  Collaboration with other providers - Discussed plan to cycle TF with Endo and primary team  Enteral Nutrition - Modify schedule - change to cycled regimen (see above)  Feeding tube flush - request by team to reduce to maintenance fluid flushes.   Medical food supplement therapy - Trial of Magic Cup supplements    Provided with full liquid/nectar thick menu.  Reviewed it with patient, but patient declined ordering anything at the time.     Goals  Total avg nutritional intake to meet a minimum of 30 kcal/kg and 1.5 g PRO/kg daily (per dosing wt 93 kg).    Monitoring/Evaluation  Progress toward goals will be monitored and evaluated per protocol.    Janice Salomon MS, RD, LD  Pager [phone]

## 2017-07-18 NOTE — PROGRESS NOTES
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  He has experienced increased hyperglycemia related to acute stress and enteral feedings.    Continue IV insulin infusion while enteral feed schedule transitions and while awaiting confirmation of plan for colonoscopy.  Aspart 1 unit per 10 grams carbohydrate if taking PO.     Outpatient diabetes follow up: Dr. Eckert at [facility]             Interval History:   The last 24 hours progress and nursing notes reviewed.  Receiving Peptamin 1.5 at 80 cc/h continuous and to transition to nocturnal cycle.  Aim is for 16 h cycle.  This is longer duration than can typically be managed with available insulin preparations, but NPH dose required may be high enough that it will last longer.    Allowed a diet, but has very little appetite.    D5W stopped yesterday around 1315.  Insulin drip rates overnight mostly 2-3 units/h.          Recent Labs  Lab 07/18/17  1459 07/18/17  1400 07/18/17  1256 07/18/17  1154 07/18/17  1106 07/18/17  0902  07/18/17  0624  07/17/17  0630  07/16/17  2142  07/16/17  0326  07/15/17  0627  07/14/17  0349   GLC  --   --   --   --   --   --   --  131*  --  239*  --  83  --  128*  --  200*  --  187*   * 163* 171* 141* 133* 132*  < >  --   < >  --   < >  --   < >  --   < > 190*  < >  --    < > = values in this interval not displayed.            Review of Systems:   See interval hx          Medications:   PTA taking Januvia and glargine    Active Diet Order      Full Liquid Diet Nectar Thickened Liquids (pre-thickened or use instant food thickener)     Physical Exam:  Gen: Alert, resting in bed, in  NAD   HEENT: NC/AT, mucous membranes are moist, NJ feeding tube  Resp: Unlabored  Neuro: alert, responsive  /76 (BP Location: Right arm)  Pulse 95  Temp 99.5  F (37.5  C) (Oral)  Resp 20  Ht 1.829 m (6')  Wt 93.2 kg (205 lb 7.5 oz)  SpO2 97%  BMI 27.87 kg/m2           Data:     Lab Results   Component Value Date    A1C  07/06/2017     Canceled, Test credited   Below Assay Range  NOTIFIED LEONARD ONEILL AT 0538 ON 7/6/17 BY CHRISSY      A1C 9.4 02/16/2017    A1C 6.2 12/14/2016    A1C 6.1 10/27/2016    A1C 8.3 07/02/2012              CBC RESULTS:   Recent Labs   Lab Test  07/18/17   0624   WBC  6.3   RBC  2.44*   HGB  6.8*   HCT  21.2*   MCV  87   MCH  27.9   MCHC  32.1   RDW  14.4   PLT  151     Recent Labs   Lab Test  07/18/17   0624  07/17/17   0630   NA  142  142   POTASSIUM  5.2  5.1   CHLORIDE  113*  114*   CO2  24  21   ANIONGAP  5  7   GLC  131*  239*   BUN  63*  66*   CR  1.46*  1.55*   JACOB  7.7*  7.8*     Liver Function Studies -   Recent Labs   Lab Test  07/18/17   0624   PROTTOTAL  5.4*   ALBUMIN  1.9*   BILITOTAL  0.6   ALKPHOS  171*   AST  88*   ALT  61     Lab Results   Component Value Date    INR 1.19 07/17/2017    INR 1.17 07/10/2017       Janice Guzman APRN [facility] [phone]    Diabetes Management job code 0246

## 2017-07-18 NOTE — PROCEDURES
VIDEO EEG DAY # 2    VIDEO EEG DATE: July 18, 2017    VIDEO EEG #: -2    VIDEO EEG SOURCE FILE DURATION: 10 hours      PATIENT INFORMATION: Camacho Bhagat is a 51  year old male who presents with multiple medication issues, patient has altered mental status. EEG is being done to evaluate for seizures.     MEDICATIONS: No antiepileptic drug. Patient did receive oxycodone, Clonidine, and insulin    TECHNICAL SUMMARY: This video EEG monitoring procedure was performed with 23 scalp electrodes in 10-20 system placements, and additional scalp, precordial and other surface electrodes used for electrical referencing and artifact detection. No electroclinical seizures or any electrographic seizures were seen. Video was reviewed intermittently by EEG technologist and physcian for clinical seizures.     BACKGROUND ACTIVITY:  During wakefulness, the background activity consists of continuous diffuse theta slowing that is synchronous , he has a slow 6-7 Hz posterior dominant rhythm that attenuated with eye opening. Patient did fall asleep and sleep architecture was not seen. No focal abnormalities were observed.    ACTIVATION PROCEDURE:  Photic stimulation and hyperventilation were not done. During baseline testing patient is asked to answer orientation questions and he was not able to answer correctly.      EPILEPTIFORM DISCHARGES: No epileptiform activity was recorded.    ICTAL:  No clinical events or electrographic seizures were recorded. . Video was reviewed intermittently by EEG technologist and physcian for clinical seizures.     IMPRESSION OF VIDEO EEG DAY #2: This video electroencephalogram is abnormal due to the presences of generalized continuous theta slowing and background slowing consistent with a moderate encephalopathy.  No electrographic seizure, paroxsymal behaviors, or epileptiform discharges were seen.     SUMMARY OF 2 DAYS OF VIDEO EEG: During two days of Video EEG patient had generalized continuous  theta slowing and background slowing consistent with a moderate encephalopathy.  No electrographic seizure, paroxsymal behaviors, or epileptiform discharges were seen.       NICOLLE HARRIS MD

## 2017-07-18 NOTE — PLAN OF CARE
Problem: Goal Outcome Summary  Goal: Goal Outcome Summary  Pt alert to self and place, answers some questions. Mostly illogical speech. VSS except requiring 1L O2, 86% on RA. Encouraged pulm toilet, pt mostly refusing and does not follow commands well. Productive cough. Dose of IV lasix given for pulm edema seen on CXR. Good UOP from vazquez. Slightly hypertensive, florinef held per PA. On Florinef for elevated K+, will continue to monitor K+ and BP. TF per NJ running at 100/hr. Being titrated up this afternoon to goal of 120, cycled overnight 6p-10a. Free water flushes decreased to 60/hr. No BM this shift. Speech saw pt again today, started on full liquid diet with nectar thick liquids. Pt declined any food so far. Insulin gtt continued per Alg #3, BG 120s-170s. Pt c/o generalized pain with any movement, most of his discomfort seems r/t back pain. Repositioned pt frequently to elevate head, however pt continually adjusts himself back down the bed to be lying flat. Using lift to get pt into chair and recliner chair x2. Pt tolerated sitting for only a short time. Impulsive, chair alarm on. Frequent checks on pt required when OOB. Hgb 6.8 this morning, recheck this afternoon after diuresis. May need transfusion if still low.

## 2017-07-18 NOTE — PLAN OF CARE
Problem: Goal Outcome Summary  Goal: Goal Outcome Summary  PT: PT evaluation completed and treatment initiated.  Pt was in chair at start of session however agitated and wanting to get back to bed, with assist from RN and sling, returned pt to bed.  Obtained all history from chart and previous OT evaluation as no family was present.  Pt however reporting much higher prior level of function.  Pt required maxA x 2 for rolling and all repositioning/bed mobility.  Performed PROM to LE's as well as attempted some AAROM, increased weakness noted in L LE compared to R.  Recommend TCU upon d/c, however pending pt progress and ability to participate with therapy pt may benefit from ARU depending on LOS and future therapy needs.  PT will increase frequency as able pending pt progress and participation.

## 2017-07-18 NOTE — PROGRESS NOTES
D. 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 Microvascular injury in digits (toes) this is most likely due to injury while patient was on pressor therapy and sepsis.   Patient's Right index finger, Right 1st & 2nd toes and Left 2nd toe with ischemic changes, all with black tips but normal temperature. Right index finger is painful to touch with redden area at nailbed.  Right 1st toe ischemia extends from tip to lateral aspect, with a dry scab at nailbed. Right 3rd and 4th toe with faint redness at tips.   I. 15 minutes face to face with patient assessing fingers and toes.  A. Limited ischemic changes to multiple toes and right index finger.  P. Plan of care to ischemic fingers and toes: Clean daily with Betadine solution, leave open to air, protect from trauma.  Re-consult WOC if ischemic areas open or start to drain.

## 2017-07-19 ENCOUNTER — APPOINTMENT (OUTPATIENT)
Dept: PHYSICAL THERAPY | Facility: CLINIC | Age: 52
End: 2017-07-19
Attending: TRANSPLANT SURGERY
Payer: COMMERCIAL

## 2017-07-19 LAB
ALBUMIN SERPL-MCNC: 2 G/DL (ref 3.4–5)
ALBUMIN UR-MCNC: 10 MG/DL
ALP SERPL-CCNC: 201 U/L (ref 40–150)
ALT SERPL W P-5'-P-CCNC: 50 U/L (ref 0–70)
ANION GAP SERPL CALCULATED.3IONS-SCNC: 4 MMOL/L (ref 3–14)
APPEARANCE UR: ABNORMAL
AST SERPL W P-5'-P-CCNC: 60 U/L (ref 0–45)
BACTERIA SPEC CULT: ABNORMAL
BACTERIA SPEC CULT: NO GROWTH
BASOPHILS # BLD AUTO: 0 10E9/L (ref 0–0.2)
BASOPHILS NFR BLD AUTO: 0.4 %
BILIRUB DIRECT SERPL-MCNC: 0.2 MG/DL (ref 0–0.2)
BILIRUB SERPL-MCNC: 0.7 MG/DL (ref 0.2–1.3)
BILIRUB UR QL STRIP: NEGATIVE
BUN SERPL-MCNC: 67 MG/DL (ref 7–30)
CALCIUM SERPL-MCNC: 7.7 MG/DL (ref 8.5–10.1)
CHLORIDE SERPL-SCNC: 114 MMOL/L (ref 94–109)
CMV DNA SPEC NAA+PROBE-ACNC: 895 [IU]/ML
CMV DNA SPEC NAA+PROBE-LOG#: 3 {LOG_IU}/ML
CO2 SERPL-SCNC: 26 MMOL/L (ref 20–32)
COLOR UR AUTO: YELLOW
COPATH REPORT: NORMAL
CREAT SERPL-MCNC: 1.48 MG/DL (ref 0.66–1.25)
DAT POLY-SP REAG RBC QL: NORMAL
DIFFERENTIAL METHOD BLD: ABNORMAL
EOSINOPHIL # BLD AUTO: 0.1 10E9/L (ref 0–0.7)
EOSINOPHIL NFR BLD AUTO: 1.4 %
ERYTHROCYTE [DISTWIDTH] IN BLOOD BY AUTOMATED COUNT: 14.7 % (ref 10–15)
GFR SERPL CREATININE-BSD FRML MDRD: 50 ML/MIN/1.7M2
GLUCOSE BLDC GLUCOMTR-MCNC: 107 MG/DL (ref 70–99)
GLUCOSE BLDC GLUCOMTR-MCNC: 117 MG/DL (ref 70–99)
GLUCOSE BLDC GLUCOMTR-MCNC: 128 MG/DL (ref 70–99)
GLUCOSE BLDC GLUCOMTR-MCNC: 136 MG/DL (ref 70–99)
GLUCOSE BLDC GLUCOMTR-MCNC: 139 MG/DL (ref 70–99)
GLUCOSE BLDC GLUCOMTR-MCNC: 148 MG/DL (ref 70–99)
GLUCOSE BLDC GLUCOMTR-MCNC: 153 MG/DL (ref 70–99)
GLUCOSE BLDC GLUCOMTR-MCNC: 155 MG/DL (ref 70–99)
GLUCOSE BLDC GLUCOMTR-MCNC: 158 MG/DL (ref 70–99)
GLUCOSE BLDC GLUCOMTR-MCNC: 181 MG/DL (ref 70–99)
GLUCOSE BLDC GLUCOMTR-MCNC: 193 MG/DL (ref 70–99)
GLUCOSE BLDC GLUCOMTR-MCNC: 202 MG/DL (ref 70–99)
GLUCOSE BLDC GLUCOMTR-MCNC: 211 MG/DL (ref 70–99)
GLUCOSE BLDC GLUCOMTR-MCNC: 88 MG/DL (ref 70–99)
GLUCOSE BLDC GLUCOMTR-MCNC: 93 MG/DL (ref 70–99)
GLUCOSE BLDC GLUCOMTR-MCNC: 95 MG/DL (ref 70–99)
GLUCOSE BLDC GLUCOMTR-MCNC: 97 MG/DL (ref 70–99)
GLUCOSE SERPL-MCNC: 146 MG/DL (ref 70–99)
GLUCOSE UR STRIP-MCNC: NEGATIVE MG/DL
HCT VFR BLD AUTO: 25 % (ref 40–53)
HGB BLD-MCNC: 7.8 G/DL (ref 13.3–17.7)
HGB UR QL STRIP: ABNORMAL
IMM GRANULOCYTES # BLD: 0 10E9/L (ref 0–0.4)
IMM GRANULOCYTES NFR BLD: 0.4 %
KETONES UR STRIP-MCNC: NEGATIVE MG/DL
LACTATE BLD-SCNC: 0.6 MMOL/L (ref 0.7–2.1)
LEUKOCYTE ESTERASE UR QL STRIP: NEGATIVE
LYMPHOCYTES # BLD AUTO: 1.3 10E9/L (ref 0.8–5.3)
LYMPHOCYTES NFR BLD AUTO: 23.8 %
MCH RBC QN AUTO: 27.8 PG (ref 26.5–33)
MCHC RBC AUTO-ENTMCNC: 31.2 G/DL (ref 31.5–36.5)
MCV RBC AUTO: 89 FL (ref 78–100)
MICRO REPORT STATUS: ABNORMAL
MICRO REPORT STATUS: NORMAL
MICROORGANISM SPEC CULT: ABNORMAL
MONOCYTES # BLD AUTO: 0.3 10E9/L (ref 0–1.3)
MONOCYTES NFR BLD AUTO: 5.9 %
NEUTROPHILS # BLD AUTO: 3.8 10E9/L (ref 1.6–8.3)
NEUTROPHILS NFR BLD AUTO: 68.1 %
NITRATE UR QL: NEGATIVE
NRBC # BLD AUTO: 0 10*3/UL
NRBC BLD AUTO-RTO: 0 /100
PH UR STRIP: 5 PH (ref 5–7)
PLATELET # BLD AUTO: 145 10E9/L (ref 150–450)
POTASSIUM SERPL-SCNC: 5.9 MMOL/L (ref 3.4–5.3)
POTASSIUM SERPL-SCNC: 5.9 MMOL/L (ref 3.4–5.3)
POTASSIUM SERPL-SCNC: 6 MMOL/L (ref 3.4–5.3)
POTASSIUM SERPL-SCNC: 6.1 MMOL/L (ref 3.4–5.3)
POTASSIUM SERPL-SCNC: 6.3 MMOL/L (ref 3.4–5.3)
PROT SERPL-MCNC: 5.9 G/DL (ref 6.8–8.8)
RBC # BLD AUTO: 2.81 10E12/L (ref 4.4–5.9)
RBC #/AREA URNS AUTO: 6 /HPF (ref 0–2)
SODIUM SERPL-SCNC: 144 MMOL/L (ref 133–144)
SP GR UR STRIP: 1.01 (ref 1–1.03)
SPECIMEN SOURCE: ABNORMAL
SPECIMEN SOURCE: ABNORMAL
SPECIMEN SOURCE: NORMAL
TACROLIMUS BLD-MCNC: 6 UG/L (ref 5–15)
TME LAST DOSE: NORMAL H
TROPONIN I SERPL-MCNC: NORMAL UG/L (ref 0–0.04)
TROPONIN I SERPL-MCNC: NORMAL UG/L (ref 0–0.04)
URN SPEC COLLECT METH UR: ABNORMAL
UROBILINOGEN UR STRIP-MCNC: NORMAL MG/DL (ref 0–2)
WBC # BLD AUTO: 5.6 10E9/L (ref 4–11)
WBC #/AREA URNS AUTO: 2 /HPF (ref 0–2)

## 2017-07-19 PROCEDURE — 84132 ASSAY OF SERUM POTASSIUM: CPT | Performed by: PHYSICIAN ASSISTANT

## 2017-07-19 PROCEDURE — 25000132 ZZH RX MED GY IP 250 OP 250 PS 637: Performed by: NURSE PRACTITIONER

## 2017-07-19 PROCEDURE — 84132 ASSAY OF SERUM POTASSIUM: CPT

## 2017-07-19 PROCEDURE — 00000146 ZZHCL STATISTIC GLUCOSE BY METER IP

## 2017-07-19 PROCEDURE — 25000132 ZZH RX MED GY IP 250 OP 250 PS 637: Performed by: TRANSPLANT SURGERY

## 2017-07-19 PROCEDURE — 94799 UNLISTED PULMONARY SVC/PX: CPT

## 2017-07-19 PROCEDURE — 25000131 ZZH RX MED GY IP 250 OP 636 PS 637: Performed by: PHYSICIAN ASSISTANT

## 2017-07-19 PROCEDURE — 25000131 ZZH RX MED GY IP 250 OP 636 PS 637: Performed by: CLINICAL NURSE SPECIALIST

## 2017-07-19 PROCEDURE — 12000025 ZZH R&B TRANSPLANT INTERMEDIATE

## 2017-07-19 PROCEDURE — 40000556 ZZH STATISTIC PERIPHERAL IV START W US GUIDANCE

## 2017-07-19 PROCEDURE — 97530 THERAPEUTIC ACTIVITIES: CPT | Mod: GP

## 2017-07-19 PROCEDURE — 25000132 ZZH RX MED GY IP 250 OP 250 PS 637: Performed by: SURGERY

## 2017-07-19 PROCEDURE — 36415 COLL VENOUS BLD VENIPUNCTURE: CPT | Performed by: TRANSPLANT SURGERY

## 2017-07-19 PROCEDURE — 40000809 ZZH STATISTIC NO DOCUMENTATION TO SUPPORT CHARGE

## 2017-07-19 PROCEDURE — 94640 AIRWAY INHALATION TREATMENT: CPT

## 2017-07-19 PROCEDURE — 86880 COOMBS TEST DIRECT: CPT | Performed by: PHYSICIAN ASSISTANT

## 2017-07-19 PROCEDURE — 36415 COLL VENOUS BLD VENIPUNCTURE: CPT | Performed by: PHYSICIAN ASSISTANT

## 2017-07-19 PROCEDURE — 93005 ELECTROCARDIOGRAM TRACING: CPT

## 2017-07-19 PROCEDURE — 81001 URINALYSIS AUTO W/SCOPE: CPT | Performed by: PHYSICIAN ASSISTANT

## 2017-07-19 PROCEDURE — 93010 ELECTROCARDIOGRAM REPORT: CPT | Performed by: INTERNAL MEDICINE

## 2017-07-19 PROCEDURE — 84484 ASSAY OF TROPONIN QUANT: CPT | Performed by: PHYSICIAN ASSISTANT

## 2017-07-19 PROCEDURE — 25000125 ZZHC RX 250: Performed by: PHYSICIAN ASSISTANT

## 2017-07-19 PROCEDURE — 25000128 H RX IP 250 OP 636: Performed by: STUDENT IN AN ORGANIZED HEALTH CARE EDUCATION/TRAINING PROGRAM

## 2017-07-19 PROCEDURE — 25000128 H RX IP 250 OP 636: Performed by: PHYSICIAN ASSISTANT

## 2017-07-19 PROCEDURE — 40000275 ZZH STATISTIC RCP TIME EA 10 MIN

## 2017-07-19 PROCEDURE — 40000193 ZZH STATISTIC PT WARD VISIT

## 2017-07-19 PROCEDURE — 25000132 ZZH RX MED GY IP 250 OP 250 PS 637: Performed by: STUDENT IN AN ORGANIZED HEALTH CARE EDUCATION/TRAINING PROGRAM

## 2017-07-19 PROCEDURE — 85025 COMPLETE CBC W/AUTO DIFF WBC: CPT | Performed by: PHYSICIAN ASSISTANT

## 2017-07-19 PROCEDURE — 36415 COLL VENOUS BLD VENIPUNCTURE: CPT

## 2017-07-19 PROCEDURE — 80076 HEPATIC FUNCTION PANEL: CPT | Performed by: PHYSICIAN ASSISTANT

## 2017-07-19 PROCEDURE — 25000132 ZZH RX MED GY IP 250 OP 250 PS 637: Performed by: PHYSICIAN ASSISTANT

## 2017-07-19 PROCEDURE — 80197 ASSAY OF TACROLIMUS: CPT | Performed by: PHYSICIAN ASSISTANT

## 2017-07-19 PROCEDURE — 83605 ASSAY OF LACTIC ACID: CPT | Performed by: TRANSPLANT SURGERY

## 2017-07-19 PROCEDURE — 80048 BASIC METABOLIC PNL TOTAL CA: CPT | Performed by: PHYSICIAN ASSISTANT

## 2017-07-19 RX ORDER — FUROSEMIDE 10 MG/ML
80 INJECTION INTRAMUSCULAR; INTRAVENOUS ONCE
Status: COMPLETED | OUTPATIENT
Start: 2017-07-19 | End: 2017-07-19

## 2017-07-19 RX ORDER — FUROSEMIDE 10 MG/ML
40 INJECTION INTRAMUSCULAR; INTRAVENOUS ONCE
Status: COMPLETED | OUTPATIENT
Start: 2017-07-19 | End: 2017-07-19

## 2017-07-19 RX ORDER — SODIUM POLYSTYRENE SULFONATE 15 G/60ML
15 SUSPENSION ORAL; RECTAL ONCE
Status: DISCONTINUED | OUTPATIENT
Start: 2017-07-19 | End: 2017-07-19

## 2017-07-19 RX ORDER — DEXTROSE MONOHYDRATE 25 G/50ML
25 INJECTION, SOLUTION INTRAVENOUS ONCE
Status: COMPLETED | OUTPATIENT
Start: 2017-07-19 | End: 2017-07-20

## 2017-07-19 RX ORDER — FLUDROCORTISONE ACETATE 0.1 MG/1
0.1 TABLET ORAL
Status: DISCONTINUED | OUTPATIENT
Start: 2017-07-19 | End: 2017-07-24

## 2017-07-19 RX ADMIN — TACROLIMUS 7 MG: 5 CAPSULE ORAL at 07:55

## 2017-07-19 RX ADMIN — FLUDROCORTISONE ACETATE 0.1 MG: 0.1 TABLET ORAL at 07:55

## 2017-07-19 RX ADMIN — TACROLIMUS 7 MG: 5 CAPSULE ORAL at 17:42

## 2017-07-19 RX ADMIN — OXYCODONE HYDROCHLORIDE 10 MG: 5 SOLUTION ORAL at 19:51

## 2017-07-19 RX ADMIN — PANTOPRAZOLE SODIUM 40 MG: 40 TABLET, DELAYED RELEASE ORAL at 07:54

## 2017-07-19 RX ADMIN — HEPARIN SODIUM 5000 UNITS: 5000 INJECTION, SOLUTION INTRAVENOUS; SUBCUTANEOUS at 11:28

## 2017-07-19 RX ADMIN — CYCLOBENZAPRINE HYDROCHLORIDE 10 MG: 10 TABLET, FILM COATED ORAL at 22:01

## 2017-07-19 RX ADMIN — INSULIN GLARGINE 25 UNITS: 100 INJECTION, SOLUTION SUBCUTANEOUS at 14:12

## 2017-07-19 RX ADMIN — HEPARIN SODIUM 5000 UNITS: 5000 INJECTION, SOLUTION INTRAVENOUS; SUBCUTANEOUS at 19:51

## 2017-07-19 RX ADMIN — LIDOCAINE 2 PATCH: 50 PATCH CUTANEOUS at 19:51

## 2017-07-19 RX ADMIN — HUMAN INSULIN 7 UNITS/HR: 100 INJECTION, SOLUTION SUBCUTANEOUS at 08:24

## 2017-07-19 RX ADMIN — FUROSEMIDE 40 MG: 10 INJECTION, SOLUTION INTRAVENOUS at 13:56

## 2017-07-19 RX ADMIN — Medication 0.2 MG: at 19:51

## 2017-07-19 RX ADMIN — ALBUTEROL SULFATE 2.5 MG: 2.5 SOLUTION RESPIRATORY (INHALATION) at 09:57

## 2017-07-19 RX ADMIN — OXYCODONE HYDROCHLORIDE 5 MG: 5 SOLUTION ORAL at 14:05

## 2017-07-19 RX ADMIN — HEPARIN SODIUM 5000 UNITS: 5000 INJECTION, SOLUTION INTRAVENOUS; SUBCUTANEOUS at 04:28

## 2017-07-19 RX ADMIN — FUROSEMIDE 80 MG: 10 INJECTION, SOLUTION INTRAVENOUS at 15:57

## 2017-07-19 RX ADMIN — OXYCODONE HYDROCHLORIDE 10 MG: 5 SOLUTION ORAL at 01:08

## 2017-07-19 RX ADMIN — ACETYLCYSTEINE 2 ML: 200 INHALANT RESPIRATORY (INHALATION) at 09:57

## 2017-07-19 RX ADMIN — MULTIVIT AND MINERALS-FERROUS GLUCONATE 9 MG IRON/15 ML ORAL LIQUID 15 ML: at 07:55

## 2017-07-19 RX ADMIN — Medication 80 MG: at 07:55

## 2017-07-19 RX ADMIN — AMLODIPINE BESYLATE 5 MG: 10 TABLET ORAL at 07:54

## 2017-07-19 RX ADMIN — POLYETHYLENE GLYCOL 3350, SODIUM SULFATE ANHYDROUS, SODIUM BICARBONATE, SODIUM CHLORIDE, POTASSIUM CHLORIDE 4000 ML: 236; 22.74; 6.74; 5.86; 2.97 POWDER, FOR SOLUTION ORAL at 18:37

## 2017-07-19 RX ADMIN — ACETYLCYSTEINE 2 ML: 200 INHALANT RESPIRATORY (INHALATION) at 01:57

## 2017-07-19 RX ADMIN — Medication 0.2 MG: at 07:55

## 2017-07-19 RX ADMIN — FUROSEMIDE 40 MG: 10 INJECTION, SOLUTION INTRAVENOUS at 08:30

## 2017-07-19 RX ADMIN — VALGANCICLOVIR HYDROCHLORIDE 450 MG: 50 POWDER, FOR SOLUTION ORAL at 07:55

## 2017-07-19 RX ADMIN — HUMAN INSULIN 3 UNITS/HR: 100 INJECTION, SOLUTION SUBCUTANEOUS at 00:22

## 2017-07-19 RX ADMIN — OXYCODONE HYDROCHLORIDE 10 MG: 5 SOLUTION ORAL at 06:31

## 2017-07-19 RX ADMIN — ALBUTEROL SULFATE 2.5 MG: 2.5 SOLUTION RESPIRATORY (INHALATION) at 01:58

## 2017-07-19 ASSESSMENT — PAIN DESCRIPTION - DESCRIPTORS
DESCRIPTORS: PATIENT UNABLE TO DESCRIBE
DESCRIPTORS: PATIENT UNABLE TO DESCRIBE

## 2017-07-19 NOTE — PLAN OF CARE
Problem: Goal Outcome Summary  Goal: Goal Outcome Summary  Outcome: No Change  Pt hypertensive throughout shift, BPs down to 140s after morning meds. Tachycardic in the 90s throughout shift, OVSS on 2L. Pt A/O x2. Disoriented to time and situation. Pt confused and restless; frequently reminded not to pull at lines (PIV was pulled out). Bed alarm on, attendant present (new order today). He complains of pain but cannot verbalize where the pain is, given oxy x1. K 5.9, given Lasix x2. Recheck for 6.0. No nausea. -211 with TF running, decreased to  with TF off on Alg 3-4. Pt on a nectar thickened liquid diet but has no appetite. Denies food whenever offered. Cycled TF [id number] through NJ. First PIV pulled out as the pt was rolling around in bed. New PIV in R forearm TKO with insulin drip. Insulin drip off, subQ insulin started. No BM this shift. Pt passing gas. Good urine output in Crowe. Bag changed. UA/UC sent. Abdominal incision CDI.  Repositioned q2 hours (and pt repositioning self in bed), now up in the chair. Pt up using lift and repositioned with draw sheets. Spoke with pt's mother who discussed pt's history of fibromyalgia and arthritis and a decrease in symptoms the more the patient moves. He tends to feel better when sitting in the chair. Will continue to monitor and notify team of changes as needed.

## 2017-07-19 NOTE — PROGRESS NOTES
CLINICAL NUTRITION SERVICES    K+ elevated at 5.9 mmol/L this morning, has been on high end of normal over the last week.  Team requested change in TF formula.     Interventions:  Modified TF regimen to Nepro @ 115 ml/hr x 14 hours (1341-2431) + 1 pkt Prosource TF daily -> provides 2938 kcal (32 kcal/kg), 141 grams protein (1.5 g/kg), 1175 ml free water daily.     Recommendations:  Continue free water as ordered (60 ml q 1 hour).  Could consider a slight increase to 75 ml q 1 hour as Nepro is slightly more concentrated than previous formula.     Janice Salomon MS, RD, LD  Pager [phone]

## 2017-07-19 NOTE — PLAN OF CARE
Problem: Goal Outcome Summary  Goal: Goal Outcome Summary  Outcome: No Change  Patient has been hypertensive and tachycardic at times this shift. Plus and blood pressure came down on their own. T-max 99.7. He is sating 93-96 on 2L of oxygen. Patient received oxycodone x1 for abdominal and back pain. He denies nausea. He tolerated sips of nector thick liquids. Crowe is patent and draining. He had a bowel movement x1 this evening. Tube feeds were increased to 120cc/hr at 1600 via his NJ. Water flushes continue at 60cc ever hour. Tube feeds are now cycled and are to be turned off at 1000. Patient continues on an insulin drip. Blood sugars have been between 86 and 159. Patient's hemoglobin check came back at 6.5 this afternoon, so the patient received a unit of blood. He also received a dose of lasix after the unit was completed. Patient received a bed bath this evening. Will continue to monitor and update the team as needed.

## 2017-07-19 NOTE — PLAN OF CARE
Problem: Goal Outcome Summary  Goal: Goal Outcome Summary  PT-7A: Needing encouragement to participate in therapy this day.  Deficits towards independence with functional mobility includes pain, weakness, impaired cognition.  Impulsive behaviors demonstrated with functional mobility this day.  Needing Mod-Max A x 2 for bed mobility, sit->stand transfers with Mod A x 2.       Recommend continued use of ceiling lift for transfers to and from bed until pt is able to perform consistent transfers with therapy.     Recommend discharge to TCU; potential discharge to ARU with increased participation/tolerance for therapy.

## 2017-07-19 NOTE — PROGRESS NOTES
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  He has experienced increased hyperglycemia related to acute stress and enteral feedings.    Transition off insulin infusion:  -glargine 25 units q24h, stop insulin drip 2 hours after dose given  -tonight will start NPH ~28 units for Nepro, if it is confirmed pt will not be doing bowel prep tonight:  Order reads HOLD NPH if no TFs.  -aspart 1 unit per 10 grams carbohydrate if taking PO  -aspart high correction q4h once off insulin drip     Outpatient diabetes follow up: Dr. Eckert at [facility]             Interval History:   The last 24 hours progress and nursing notes reviewed.  Receiving Peptamin 1.5 at 120 cc/h since 1600 yesterday.  Insulin need more steady and with stable BGs from 4195-6817.  Spiking this morning but unclear r/t TF, interrupted insulin infusion, mornign cortisol??  Pt pulling his IVs.  And not sleeping, so preference to get off frequent finger sticks.  Basal insulin need not clear.  Past doses glargine have been higher than dropping insulin drip rates now would suggest.  Pt reports tolerating TF without discomfort.  Denies any pain.  Note overnight pt very sensitive to any stimuli.    Transplant awaiting CMV PCR to determine need for colonoscopy.  TF change to Nepro 115 cc/h x 14 hours starting tonight (18.5 grams carb/h versus Peptamin  1.5 22 grams carb/h).          Recent Labs  Lab 07/19/17  1305 07/19/17  1159 07/19/17  1101 07/19/17  1011 07/19/17  0904 07/19/17  0821  07/19/17  0621  07/18/17  0624  07/17/17  0630  07/16/17  2142  07/16/17  0326  07/15/17  0627   GLC  --   --   --   --   --    --   --  146*  --  131*  --  239*  --  83  --  128*  --  200*   BGM 97 117* 148* 158* 211* 202*  < >  --   < >  --   < >  --   < >  --   < >  --   < > 190*   < > = values in this interval not displayed.            Review of Systems:   See interval hx          Medications:   PTA taking Januvia and glargine versus glargine and aspart per carb    Active Diet Order      Full Liquid Diet Nectar Thickened Liquids (pre-thickened or use instant food thickener)     Physical Exam:  Gen: Alert, resting in bed, in NAD   HEENT: NC/AT, mucous membranes are moist, NJ feeding tube  Resp: Unlabored  Neuro: alert, responsive, moaning/groaning  /71 (BP Location: Right arm)  Pulse 99  Temp 99.3  F (37.4  C) (Oral)  Resp 20  Ht 1.829 m (6')  Wt 107 kg (235 lb 12.8 oz)  SpO2 95%  BMI 31.98 kg/m2           Data:     Lab Results   Component Value Date    A1C  07/06/2017     Canceled, Test credited   Below Assay Range  NOTIFIED LEONARD ONEILL AT 0538 ON 7/6/17 BY CHRISSY      A1C 9.4 02/16/2017    A1C 6.2 12/14/2016    A1C 6.1 10/27/2016    A1C 8.3 07/02/2012            Recent Labs   Lab Test  07/19/17   0621  07/18/17   0624   NA  144  142   POTASSIUM  5.9*  5.2   CHLORIDE  114*  113*   CO2  26  24   ANIONGAP  4  5   GLC  146*  131*   BUN  67*  63*   CR  1.48*  1.46*   JACOB  7.7*  7.7*     CBC RESULTS:   Recent Labs   Lab Test  07/19/17   0621   WBC  5.6   RBC  2.81*   HGB  7.8*   HCT  25.0*   MCV  89   MCH  27.8   MCHC  31.2*   RDW  14.7   PLT  145*       Janice Griffinpton APRN [id number]    Diabetes Management job code [id number]

## 2017-07-19 NOTE — PROGRESS NOTES
[facility], [place]    Sepsis Evaluation Progress Note    Date of Service: 07/19/2017    I was called to see Camacho Bhagat due to abnormal vital signs triggering the Sepsis SIRS screening alert. He is not known to have an infection.     Physical Exam    Vital Signs:  Temp: 99.2  F (37.3  C) Temp src: Oral BP: 153/84 Pulse: 95 Heart Rate: 102 Resp: 28 SpO2: 94 % O2 Device: Nasal cannula Oxygen Delivery: 2 LPM    Lab:  Lactic Acid   Date Value Ref Range Status   07/19/2017 0.6 (L) 0.7 - 2.1 mmol/L Final       The patient is at baseline mental status.    The rest of their physical exam is significant for atelectasis due to poor respiratory effort with wheezing.    Assessment and Plan    The SIRS and exam findings are likely due to poor respiratory effort, there is no sign of sepsis at this time.    Disposition: The patient will remain on the current unit. We will continue to monitor this patient closely.    Cuca Gastelum MD

## 2017-07-19 NOTE — PROGRESS NOTES
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     Graft Function: Good function. US liver normal doppler. Slight increase in Alk phos and AST.   -Tac dose increased.   Immunosuppression Management:   CellCept 250 mg BID. Held since 6/27/17 due to neutropenia. Continue to hold.   Tac goal level 5-8. Since MMF held will aim for goal ~8. Prograf 6 mg BID, increased yesterday AM. 7/17 Level 5.1, 12.5 hr trough. Titrating up dose over past few days, poor absorption.  Increase dose to 7 mg BID, check level every 48hrs, level to be done tomorrow.  Cardiorespiratory: Intubated for ex lap, initially requiring pressor support. HDS. Moderate right sided pleural effusion. Chest tube placed 7/9 that was removed 7/12. HDS.   -Difficult to have patient do aggressive pulmonary toilet. Albuterol/Mucomyst neb x 4 doses and start acapella. Patient to be OOB to chair as much as possible.   -CXR- pulmonary edema. RR increased. Lasix 40 mg IV x 2 doses.   Hematology: Pancytopenia, acute on chronic, secondary to medications. MMF and bactrim held (since 6/27). Valcyte held on admission. Neupogen 480 mcg given on 7/4 to 7/7,  CMV seronegative and donor seropositive.   -7/3 CMV PCR < 137. 7/10 CMV  7/15 CMV PCR 4220 copies. Valcyte ppx dose restarted. Continue to hold MMF and bactrim.   -Anemia- 6.8, trending down. Will check DERRELL given spherocytes on P smear. Recheck Hbg after lasix given. If < 7 will transfuse one unit RBCs.   -Cytology ascites fluid, negative for malignancy   GI: Neutropenic colitis.   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 ID recommending  possible colonoscopy, will wait for CMV PCR, it was not run this AM. Will be done tomorrow.   -NJ placed. TF Peptamen titrated at goal.   -C diff negative.  -Start nectar thick full liquids. Aspiration precautions. SLP following.    Fluid/Electrolytes/Renal:  EJ, secondary to hypoperfusion, sepsis. Cr 1.5 (1.7). BUN trending down. Good UO. HypoNa, resolved. Decrease free water to maintenance rate. Hyperkalemia, PTA on florinef- HOLD today due to lasix. K stable. Avoid nephrotoxins.   Endocrine: DM II, on insulin gtt. Endocrine consulted to transition to subQ insulin.  Infectious disease: Started on Zosyn, Vancomycin, Flagyl, Micafungin on admission (7/4/2017). 7/5 Blood cultures, no growth thus far. Abdominal fluid culture collected intraoperatively, gram stain with no organisms, fungal and bacterial culture, GPCs. Bronchoalveolar lavage growing VRE, colonization. Tx ID consulted.  -Fever > 101 for several days, WBC 6.3, stable. 7/13 CT scan negative for abscess, large amount of ascites, mildly prominent lymph nodes-no significant change. 7/15 BAL cx and 7/14 ascites cx unremarkable per ID. Ascitic fluid exudate. Continue ertapenem x 14 days. Check EBV PCR. Recheck CMV PCR. If CMV PCR low copies would consider colonoscopy to eval for CMV colitis and if CMV PCR increased copies would start treatment with IV ganciclovir.   -T max 24 hrs- 100.3  -CMV viremia - CMV D+R-, low viral count. IS reduced (MMF held). Valcyte 450 daily restarted.   -Possible drug fever. Seroquel stopped due to this  Neuro: Delirium, hold, limit medications that have CNS SE.  Neurology consulted- dx likely toxic metabolic encephalopathy, improving slowly. VideoEEG showed mild to moderate encephalopathy. Assist patient with sleep hygiene.   Pain: General discomfort, chronic back pain. Patient very restless. Lidoderm patch for back pain.  Oxycodone 5 mg PRN. Restart PTA flexeril qHS   Vascular: Right Arterial line clot 2/2 previous   art line.  Vascular consulted. Recommend ASA only. Some evidence of microvascular injury in digits (toes) this is most likely due to injury while patient was on pressor therapy and sepsis.   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   Activity: Daily PT/OT  Prophylaxis: PPI, DVT-SCD  Disposition: 7A.    Medical Decision Making: Medium  Admit 74068 (moderate level decision making)    PILLO/Fellow/Resident Provider: Ada Driver PA-C [id number]    Faculty: Jovan Tran M.D.      __________________________________________________________________  Transplant History:.  3/4/2017 DD OLT with Dr. Tran (Liver), Postoperative day: 137     Interval History: History is obtained from EMR and nursing staff.  Overnight events: Continues to be confused. Unable to follow commands. Orientated to self only. General discomfort. No BM today.       ROS:   A 10-point review of systems was negative except as noted above.    Meds:    insulin glargine  25 Units Subcutaneous Q24H     - MEDICATION INSTRUCTIONS -   Does not apply Once     insulin aspart  1-12 Units Subcutaneous Q4H     furosemide  80 mg Intravenous Once     aspirin  80 mg Oral Daily     lidocaine  1-2 patch Transdermal Q24h    And     lidocaine   Transdermal Q24H    And     lidocaine   Transdermal Q8H     cyclobenzaprine  10 mg Oral At Bedtime     insulin aspart   Subcutaneous TID w/meals     tacrolimus  7 mg Oral BID IS     cloNIDine  0.2 mg Oral BID     amLODIPine  5 mg Oral Daily     multivitamins with minerals  15 mL Per Feeding Tube Daily     heparin  5,000 Units Subcutaneous Q8H     lidocaine (viscous)  5 mL Topical Once     pantoprazole  40 mg Oral or Feeding Tube Daily     sodium chloride (PF)  3 mL Intracatheter Q8H       Physical Exam:     Admit Weight: 94.2 kg (207 lb 11.2 oz) (SCALE 2)    Current vitals:   /71 (BP Location: Right arm)  Pulse 99  Temp 99.3  F (37.4  C) (Oral)  Resp 20  Ht 1.829 m (6')  Wt 107 kg (235 lb 12.8 oz)   SpO2 95%  BMI 31.98 kg/m2    Vital sign ranges:    Temp:  [97.4  F (36.3  C)-99.7  F (37.6  C)] 99.3  F (37.4  C)  Pulse:  [] 99  Heart Rate:  [] 96  Resp:  [18-28] 20  BP: (129-187)/() 147/71  SpO2:  [93 %-98 %] 95 %  Patient Vitals for the past 24 hrs:   BP Temp Temp src Pulse Heart Rate Resp SpO2 Weight   07/19/17 1119 147/71 99.3  F (37.4  C) Oral 99 - 20 95 % -   07/19/17 1000 - - - - - - 97 % -   07/19/17 0936 169/79 - - - 96 - 93 % 107 kg (235 lb 12.8 oz)   07/19/17 0750 (!) 187/103 97.8  F (36.6  C) Axillary 99 99 20 97 % -   07/19/17 0400 159/85 97.4  F (36.3  C) Axillary - 99 22 97 % -   07/19/17 0115 153/84 99.2  F (37.3  C) Oral - 102 28 94 % -   07/19/17 0030 150/82 98.4  F (36.9  C) Oral - 101 22 98 % -   07/18/17 2046 130/74 99.4  F (37.4  C) Oral 95 96 18 96 % -   07/18/17 1917 (!) 179/92 98.3  F (36.8  C) Oral - 101 20 96 % -   07/18/17 1831 165/81 99.4  F (37.4  C) Oral 100 100 20 95 % -   07/18/17 1808 162/84 99.5  F (37.5  C) Oral 101 101 20 93 % -   07/18/17 1747 - 99.7  F (37.6  C) Oral - - - - -   07/18/17 1523 129/76 99.5  F (37.5  C) Oral - 96 20 97 % -     General Appearance: NAD, general discomfort  Skin: normal, warm, dry  Heart: RRR  Lungs: B/l crackles, non labored on 2L NC. Weak cough.   Abdomen: The abdomen is soft, non tender with exam, midline incision is c/d/i and covered. Chevron incision well healed.   : vazquez is present, yellow urine in bag.   Extremities:BLE edema  Right index finger with necrotic tip,  right first and second toe with necrotic tip, left second toe with necrotic tip.   Neurologic: alert, orientated x 1. No tremor. Minimal responses to questions. Restless    Data:   CMP    Recent Labs  Lab 07/19/17  1239 07/19/17  0621 07/18/17  0624  07/16/17  0326  07/14/17  1517 07/14/17  0349   NA  --  144 142  < > 148*  < >  --  144   POTASSIUM 6.0* 5.9* 5.2  < > 5.1  < >  --  4.4   CHLORIDE  --  114* 113*  < > 123*  < >  --  116*   CO2  --  26 24  < >  22  < >  --  20   GLC  --  146* 131*  < > 128*  < >  --  187*   BUN  --  67* 63*  < > 71*  < >  --  84*   CR  --  1.48* 1.46*  < > 1.66*  < >  --  1.74*   GFRESTIMATED  --  50* 51*  < > 44*  < >  --  41*   GFRESTBLACK  --  60* 61  < > 53*  < >  --  50*   JACOB  --  7.7* 7.7*  < > 7.8*  < >  --  8.0*   MAG  --   --   --   --  2.4*  --   --  2.2   PHOS  --   --   --   --  3.8  --   --  3.7   ALBUMIN  --  2.0* 1.9*  < > 1.8*  --   --  2.0*   BILITOTAL  --  0.7 0.6  < > 0.3  --   --  0.5   ALKPHOS  --  201* 171*  < > 134  --   --  171*   AST  --  60* 88*  < > 72*  --   --  68*   ALT  --  50 61  < > 44  --   --  36   FAMY  --   --   --   --   --   --  6  --    < > = values in this interval not displayed.  CBC    Recent Labs  Lab 07/19/17  0621 07/18/17  1517 07/18/17  0624   HGB 7.8* 6.5* 6.8*   WBC 5.6  --  6.3   *  --  151     COAGS    Recent Labs  Lab 07/17/17  0630   INR 1.19*      Urinalysis  Recent Labs   Lab Test  07/19/17   1150  07/11/17   1105   06/14/17   1508   04/11/16   1345   COLOR  Yellow  Yellow   < >   --    < >  Yellow   APPEARANCE  Slightly Cloudy  Clear   < >   --    < >  Clear   URINEGLC  Negative  Negative   < >   --    < >  30*   URINEBILI  Negative  Negative   < >   --    < >  Negative   URINEKETONE  Negative  Negative   < >   --    < >  Negative   SG  1.009  1.009   < >   --    < >  1.016   UBLD  Moderate*  Negative   < >   --    < >  Small*   URINEPH  5.0  5.0   < >   --    < >  5.0   PROTEIN  10*  10*   < >   --    < >  30*   NITRITE  Negative  Negative   < >   --    < >  Negative   LEUKEST  Negative  Negative   < >   --    < >  Negative   RBCU  6*  <1   < >   --    < >  1   WBCU  2  <1   < >   --    < >  1   UTPG   --    --    --   1.55*   --   0.41*    < > = values in this interval not displayed.       Cultures:   Blood Cultures x2 7/4/2017 NGTD     Abdominal Fluid Culture 7/4/2017: NGTD    Abdominal Fluid Culture 7/5/17: NGTD    Bronchoalveolar Culture 7/5/17: VRE.     Sputum  endotracheal gm stain/culture 7/11/17: >25 PMNs/low power field   Few Mixed gram positive elvie    CT scan:    7/4/2017 CONCLUSION:   1. Right colonic wall thickening suggesting colitis. Follow-up is necessary to confirm resolution in order to exclude colonic mass.  2. Transverse colon is not well distended reducing evaluation for wall thickening.  3. Small amount of ascites.  4. Splenomegaly.  5. Prominent portal and splenic veins. If confirmation of vascular patency is indicated consider follow-up ultrasound of the liver with Doppler.  6. Small right pleural effusion.  7. Stranding in the subcutaneous fat of the ventral pelvis.     Abdominal XR 7/4/2017:   1. No evidence of pneumoperitoneum.  2. Dilated loop of bowel in the central abdomen, likely sigmoid.    XR Chest Portable 1 View 7/4/17:  1. Endotracheal tube tip projects 5.3 cm from the chacorta.  2. Increased bilateral pleural effusions, right greater than left.  3. Unchanged bibasilar patchy opacities.    Attestation:    The patient has been seen and evaluated by me.   Vital signs, labs, medications and orders were reviewed.   When obtained, diagnostic images were reviewed by me and interpreted as above.    The care plan was discussed with the multidisciplinary team and I agree with the findings and plan in this note, with any differences recorded in blue.    Immunosuppressive medication management was reviewed and adjusted as reflected in the note and orders.     .

## 2017-07-19 NOTE — PLAN OF CARE
Problem: Goal Outcome Summary  Goal: Goal Outcome Summary  Outcome: No Change  Heartrate in the 100's, respiratory rate 28, hypertensive.  Pt triggered sepsis protocol.  MD notified.  Lactic 0.6  Pt continues to be wheezy.  Scheduled neb given.  Encouraged coughing and deep breathing. Pt unable to do incentive spirometer.  Pt does have a strong loose cough but unable to bring anything up.  Oral suction provided at bedside.    Pt restless for the majority of the night.  Moving about in bed constantly. PIV may or may not have been pulled out intentionally. Oriented to self and knows he's in a hospital.    Insulin gtt controlling blood sugars, using algorithm 3.    Complains of pain constantly, oxycodone X 2, lidoderm patches on back.    Crowe with great urine output after lasix given on evenings.  Tube feeds at 120c/hr with 50cc/hr water.    Pt needs better pain control and sleep.  Try to keep up in chair and keep pt awake as much as possible today. Work on pulmonary toilet.

## 2017-07-19 NOTE — PROGRESS NOTES
[facility]    Transplant Infectious Diseases Inpatient Progress note      Camacho Bhagat MRN# [id number]   YOB: 1965 Age: 51 year old   Date of Admission: 7/4/2017  4:45 AM  Transplant: 3/4/2017 (Liver), Postoperative day: 137            Recommendations:   1. Keep VGCV/GCV on hold for now.   2. Colonoscopy with biopsy to rule out CMV colitis also make sure no GI PTLD.   3. Repeat CMV PCR 7/24/2017        Summary of Presentation:   Transplants:  3/4/2017 (Liver), Postoperative day:  137     This patient is a 51 year old male with CASTAÑEDA s/p OLT in 3/2017. Basilixima b for induction. Now on TAC.   Presented with abdominal pain and was found to have neutropenic colitis s/p exploratory laparotomy. .         Active Problems and Infectious Diseases Issues:   1. SIRS with fever (resolved)  2. Neutropenic colitis (without ischemia per exp lap)   The fever resolved, not receiving tylenol.    The fever was likely due to resolving neutropenic colitis and ischemic injury to extremities with occlusion of right radial artery.   Finished 14 days of ertapenem on 7/18/2017.   The ascitic fluid analysis is c/w exudate but cx negative to date. That could be due to inflamed bowels.   Cytology and leukemia/lymphoma studies negative.     The neutropenia was attributed to drughs (MMF/bactrim/VGCV).     3. CMV primary infection (donor derived)  This could be due to acute illness.   Slowly rising (increased by 0.8 log within 8 days). In this patient who may develop neutropenia with anti-CMV therapy, I would confirm either CMV disease or worsening viremia up to 1500 IU/mL before initiating therapy with GCV or VGCV.   Given recent colitis, I feel it's reasonable to rule out CMV colitis with colonoscopy.   We should continue to follow weekly CMV PCR and treat if CMV PCR reaches threshold of 1500 IU/mL.   VGCV ppx was DCed in the past prior to admission due to neutropenia, then resumed briefly from  "7/15/2017 till 7/19/2017.   The CMV in the BAL is of no clinical significance.     4. 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    These are likely all colonizers for contaminants.     5. Staphylococcus capitis in ascitic fluid 7/5/2017   Contamination.     6. Rhinovirus in BAL 7/15/2017  Likely to be due to chronic shedding since the patient's main presentation was mostly abdominal not respiratory.      Other Infectious Disease issues include  - PCP prophylaxis: bactrim DCed in June of 2017 due to neutropenia.   - Serostatus: CMV D+/R-, EBV D+/R-, HSV1?/2?, VZV ?  - Immunization status: up-to-date  - Gamma globulin status: not recently checked.     Discussed with primary team.   Attestation:  I interviewed the patient and obtained history from the patient, and by reviewing the patient's chart including outside records, microbiological data, and radiological data. All data are summarized in this notes.  Naseem Figueroa   Pager: [phone]  07/19/2017        Interim History:  Patient is confused, answers questions but not appropriately.   He knew he was in [facility] but thought it was \"[facility]\", he stated this year is \".17\" instead of 2017.   He then started to talk about his dog.   Apparently diarrhea resolved.     HPI:  In summary:  CASTAÑEDA s/p OLT in 3/2017.   Presented with abdominal pain and underwent exploratory laparotomy which was not c/w with ischemia. It was felt to be related to neutropenia.   The patient has neutropenia since June of 2017 attributed to immunosuppressive therapy/bactrim/VGCV.   Bactrim DCed June of 2017 and VGCV was DCed at unknown tome.     The patient was started on broad spectrum ABx with persistent fever.   Bronchoscopy done 7/12/2017 grew single colony of VRE which was considered a colonizer.   Ascitic fluid checked on 7/5/2017 and grew S capitis considered a contaminant.   Repeat BAL " on 7/15/2017 and repeat paracentesis on 7/7/14/2017 are so far unremarkable for growth but the ascitic fluid is an exudate.     The course was complicated by thrombosis of the right radial artery with ischemia to to the tip of the right index finger.   He also has ischemia to the right hallux and second toe.       ROS:  ROS was unobtainalbe due the patient's poor mentation.                  Pysical Examination:  Temp: 99.3  F (37.4  C) Temp src: Oral BP: 147/71 Pulse: 99 Heart Rate: 96 Resp: 20 SpO2: 95 % O2 Device: Nasal cannula Oxygen Delivery: 2 LPM  Vitals:    07/15/17 0000 07/15/17 1200 07/16/17 0600 07/17/17 0600   Weight: 94.5 kg (208 lb 5.4 oz) 92.9 kg (204 lb 12.9 oz) 93.5 kg (206 lb 2.1 oz) 93.2 kg (205 lb 7.5 oz)    07/19/17 0936   Weight: 107 kg (235 lb 12.8 oz)     Constitutional: awake but confused.   Lungs: Coarse BS bilaterally, no accessory muscle use.   CVS: RRR, normal S1/S2, no murmur.   Abdomen: generalized tenderness, distended, no masses, no bruit, normal BS.   Extremities: +1 pitting edema of bilateral lower extremities, ischemic right hallux and right second toe, also ischemic tip of right index, no ulcers, normal ROM of all joints, no swelling or erythema of any of joints and no tenderness to palpation.   Skin: as the exam of the extremities, no induration, fluctuation or discharge,and no rash       Medications:  Medications that Require Transfusion:     NaCl Stopped (07/19/17 0945)     IV fluid REPLACEMENT ONLY       insulin (regular) 1.5 Units/hr (07/19/17 1159)   Scheduled Medications:     insulin glargine  25 Units Subcutaneous Q24H     - MEDICATION INSTRUCTIONS -   Does not apply Once     insulin aspart  1-12 Units Subcutaneous Q4H     aspirin  80 mg Oral Daily     lidocaine  1-2 patch Transdermal Q24h    And     lidocaine   Transdermal Q24H    And     lidocaine   Transdermal Q8H     cyclobenzaprine  10 mg Oral At Bedtime     insulin aspart   Subcutaneous TID w/meals     tacrolimus  7  mg Oral BID IS     cloNIDine  0.2 mg Oral BID     amLODIPine  5 mg Oral Daily     multivitamins with minerals  15 mL Per Feeding Tube Daily     heparin  5,000 Units Subcutaneous Q8H     lidocaine (viscous)  5 mL Topical Once     pantoprazole  40 mg Oral or Feeding Tube Daily     sodium chloride (PF)  3 mL Intracatheter Q8H       Laboratory Data:   No results found for: ACD4    Inflammatory Markers    Recent Labs   Lab Test  07/05/17   1810  03/05/17   0358  11/23/16   0813  11/16/16   0706  11/15/16   1039  11/07/16   0759  11/03/16   0949  11/01/16   0853   08/15/11   1151  04/11/11   1209  01/03/11   1246  02/15/10   1232   SED   --    --   45*   --    --    --    --    --    --   11  14  17*  25*   CRP  354.0  20.0*  58.0*  59.1*  49.8*  66.0*  140.0*  150.0*   < >  11.1*  9.0*  11.7*  17.5*    < > = values in this interval not displayed.       Immune Globulin Studies     Recent Labs   Lab Test  08/15/11   1243   IGG  1160   IGG1  734   IGG2  294   IGG3  7*   IGG4  59       Metabolic Studies       Recent Labs   Lab Test  07/19/17   1239  07/19/17   0621  07/19/17   0109  07/18/17   0624  07/17/17   0630  07/16/17   2142  07/16/17   0326  07/15/17   0627  07/14/17   0349   07/11/17   0328   07/06/17   0316   NA   --   144   --   142  142  148*  148*  148*  144   < >  150*   < >  143   POTASSIUM  6.0*  5.9*   --   5.2  5.1  5.0  5.1  4.8  4.4   < >  3.8   < >  5.0   CHLORIDE   --   114*   --   113*  114*  118*  123*  120*  116*   < >  120*   < >  116*   CO2   --   26   --   24  21  23  22  21  20   < >  20   < >  18*   ANIONGAP   --   4   --   5  7  6  3  7  8   < >  11   < >  9   BUN   --   67*   --   63*  66*  65*  71*  73*  84*   < >  140*   < >  62*   CR   --   1.48*   --   1.46*  1.55*  1.57*  1.66*  1.56*  1.74*   < >  2.57*   < >  2.75*   GFRESTIMATED   --   50*   --   51*  47*  46*  44*  47*  41*   < >  26*   < >  24*   GLC   --   146*   --   131*  239*  83  128*  200*  187*   < >  137*   < >  139*   A1C    --    --    --    --    --    --    --    --    --    --    --    --   Canceled, Test credited   Below Assay Range  NOTIFIED LEONARD ONEILL AT 0538 ON 7/6/17 BY CHRISSY JAMES   --   7.7*   --   7.7*  7.8*  7.6*  7.8*  8.0*  8.0*   < >  8.2*   < >  6.9*   PHOS   --    --    --    --    --    --   3.8   --   3.7   < >  4.4   < >  5.5*   MAG   --    --    --    --    --    --   2.4*   --   2.2   < >  2.3   < >  2.1   LACT   --    --   0.6*   --    --    --    --    --    --    --   0.9   < >  1.5    < > = values in this interval not displayed.       Hepatic Studies    Recent Labs   Lab Test  07/19/17   0621  07/18/17   0624  07/17/17   0630  07/16/17   0326  07/14/17   0349  07/13/17   0344   07/11/17   0328   07/05/17   1810   BILITOTAL  0.7  0.6  1.0  0.3  0.5  0.4   < >  0.5   < >   --    ALKPHOS  201*  171*  144  134  171*  146   < >  158*   < >   --    ALBUMIN  2.0*  1.9*  1.9*  1.8*  2.0*  1.8*   < >  1.8*   < >   --    AST  60*  88*  101*  72*  68*  44   < >  34   < >   --    ALT  50  61  58  44  36  26   < >  27   < >   --    LDH   --    --    --    --    --    --    --    --    --   289*   GGT   --    --    --    --    --    --    --   58   --    --     < > = values in this interval not displayed.       Pancreatitis testing    Recent Labs   Lab Test  07/17/17   0630  07/12/17   0342  07/10/17   0340  07/05/17   0346  03/05/17   0358  03/03/17   1458  02/21/17   1520  12/09/16   1159  02/08/16   1144  07/03/12   0710  09/30/10   1734   AMYLASE   --    --    --    --   53  70   --    --    --    --   82   LIPASE   --    --    --    --   216   --   326  161   --    --   138   TRIG  168*  114  177*  174*   --    --    --    --   99  182*   --        Hematology Studies      Recent Labs   Lab Test  07/19/17   0621  07/18/17   1517  07/18/17   0624  07/17/17   0630  07/16/17   0326  07/15/17   0627  07/14/17   0349   WBC  5.6   --   6.3  5.9  5.4  5.2  6.0   ANEU  3.8   --   4.2  4.1  3.4  3.8  4.9   ALYM  1.3   --    1.7  1.5  1.6  1.1  0.9   ZINA  0.3   --   0.2  0.2  0.2  0.1  0.1   AEOS  0.1   --   0.1  0.1  0.1  0.1  0.1   HGB  7.8*  6.5*  6.8*  7.7*  7.4*  7.9*  8.4*   HCT  25.0*   --   21.2*  24.4*  24.4*  25.5*  26.5*   PLT  145*   --   151  160  141*  141*  115*       Arterial Blood Gas Testing    Recent Labs   Lab Test  07/08/17   0902  07/06/17   2334  07/06/17   0015  07/05/17   2026  07/05/17   1810   PH  7.32*  7.26*  7.27*  7.27*  7.28*   PCO2  35  37  35  35  37   PO2  96  85  109*  155*  188*   HCO3  18*  17*  16*  16*  17*   O2PER  30  30.0  30.0  40  50.0        Urine Studies     Recent Labs   Lab Test  07/19/17   1150  07/11/17   1105  07/06/17   1441  06/06/17   1605  03/24/17   1315   URINEPH  5.0  5.0  5.0  5.0  5.5   NITRITE  Negative  Negative  Negative  Negative  Negative   LEUKEST  Negative  Negative  Trace*  Negative  Negative   WBCU  2  <1  9*  1  1       Vancomycin Levels     Recent Labs   Lab Test  07/06/17   0316  10/28/16   1405   VANCOMYCIN  22.1  14.4       Tacrolimus levels    Invalid input(s): TACROLIMUS, TAC, TACR  Transplant Immunosuppression Labs Latest Ref Rng & Units 7/19/2017 7/18/2017 7/17/2017 7/16/2017 7/16/2017   Tacro Level 5.0 - 15.0 ug/L 6.0 - 5.1 - -   Tacro Level - Not Provided - Not Provided - -   Creat 0.66 - 1.25 mg/dL 1.48(H) 1.46(H) 1.55(H) 1.57(H) 1.66(H)   BUN 7 - 30 mg/dL 67(H) 63(H) 66(H) 65(H) 71(H)   WBC 4.0 - 11.0 10e9/L 5.6 6.3 5.9 - 5.4   Neutrophil % 68.1 66.5 69.4 - 63.8   ANEU 1.6 - 8.3 10e9/L 3.8 4.2 4.1 - 3.4       CSF testing     Recent Labs   Lab Test  06/11/09   0225   CWBC  1   CRBC  2   CGLU  104*   CTP  54         Microbiology:  Ascites  7/1714/2017 negative to date.     7/5/2017 S acpitis    BAL   7/15/2017 yeast and Rhinovirus     7/12/2017 single colony of VRE, C albicans/dubliniensis   Sputum  7/11/2017 VRE and Coag Neg Staph     Bcx   7/15/2017 negative to date.     Last check of C difficile  C Diff Toxin B PCR   Date Value Ref Range Status    10/27/2016  NEG Final    Negative  Negative: Clostridium difficile target DNA sequences NOT detected, presumed   negative for Clostridium difficile toxin B or the number of bacteria present   may be below the limit of detection for the test.   FDA approved assay performed using [employer] GeneXpert real-time PCR.   A negative result does not exclude actual disease due to Clostridium difficile   and may be due to improper collection, handling and storage of the specimen or   the number of organisms in the specimen is below the detection limit of the   assay.         Virology:  CMV viral loads    Recent Labs   Lab Test  07/18/17   0530  07/15/17   1553  07/10/17   0340  07/03/17   1032  06/26/17   1038   CSPEC  Plasma  Bronchoalveolar Lavage  EDTA PLASMA  Plasma, EDTA anticoagulant  Plasma  CORRECTED ON 06/26 AT 1421: PREVIOUSLY REPORTED AS WB EDTA     CMVLOG  3.0*  3.6*  2.2*  <2.1  Not Calculated       CMV viral loads    Log IU/mL of CMVQNT   Date Value Ref Range Status   07/18/2017 3.0 (H) <2.1 [Log_IU]/mL Final   07/15/2017 3.6 (H) <2.1 [Log_IU]/mL Final   07/10/2017 2.2 (H) <2.1 [Log_IU]/mL Final   07/03/2017 <2.1 <2.1 [Log_IU]/mL Final   06/26/2017 Not Calculated <2.1 [Log_IU]/mL Final       CMV resistance testing  No lab results found.  No results found for: CMVCID, CMVFOS, CMVGAN    USCNS Invalid input(s): USCN, USCNS    USCNS No components found for: USCN, USCNS      No results found for: H6RES    No results found for: EBVDN, EBRES, EBVDN, EBVSP, EBVPC, EBVPCR      Pathology   Cytology of ascitic fluid 7/14/2017.   Peritoneal fluid, ascites:   - Negative for malignancy   - Acute and chronic inflammation present   Specimen Adequacy: Satisfactory for evaluation.   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   INTERPRETATION:   Ascites fluid:        Rare to absent viable B cells     COMMENT:   This specimen was collected on 7/14/17 but was not ordered/delivered to   lab/run until 7/18/17 - results should be  interpreted with caution due   to low cellularity/viability.     Due to limited cellularity we were only able to analyze the B cells.   There is no immunophenotypic evidence of B cell non-Hodgkin lymphoma.   Neoplastic cells, including large cells, may not survive specimen   processing. This sample may not be representative. Final interpretation   requires correlation with morphologic and clinical features.       Imaging:  CT c/a/p 7/13/2017 Reviewed by myself.   IMPRESSION:   1. Improved moderate right-sided pleural effusion and resolution of  left-sided pleural effusion. There remain large areas of  atelectasis/consolidation in both lungs, including in the left lower  lobe despite resolution of left-sided pleural effusion. Superimposed  infectious process cannot be excluded.  2. Moderate-large amount of ascites increased from 7/8/2017, which  makes it difficult to evaluate for the presence or absence of  inflammatory change or infectious process in the abdomen or pelvis. No  intra-abdominal abscess.  3. Stable small presumed hematoma within the ventral abdominal wall  fat.  4. Splenomegaly.        Naseem Figueroa  Pager: [phone]

## 2017-07-19 NOTE — PLAN OF CARE
Problem: Goal Outcome Summary  Goal: Goal Outcome Summary  SLP: Dysphagia therapy cancelled.  PT unable to adequately rouse for participation in dysphagia therapy.  ST to follow.

## 2017-07-19 NOTE — PROGRESS NOTES
[facility], [place]   Neurology Daily Note                  Summary:      51 year old male with Hx of liver transplant (3/2017) for ESLD due to CASTAÑEDA and hepatocarcinoma  on Tacrolimus (continued )and cell cept (hold)  who is admitted on 7/4 after laparoscopy for  neutropenic colitis and septic shock. The hospital course has been complicated by development of severe neutropenia (ANC <500),  thrombocytopenia, anemia now improved, pleural effusion, EJ , microvascular injury, radial occlusion, intubation and CMV viremia.       Neurology was consulted due to altered mental status.                   Overnight:      triggering the Sepsis SIRS screening alertt                   Notes reviewed      Nurses:  Pt unable to do incentive spirometer.  Pt does have a strong loose cough but unable to bring anything up.  Speech:PT unable to adequately rouse for participation in dysphagia therapy                     Medications:           Current Facility-Administered Medications   Medication     oxyCODONE (ROXICODONE) IR tablet 5-10 mg     HYDROmorphone (PF) (DILAUDID) injection 0.3-0.5 mg     cloNIDine 0.1 mg/mL (CATAPRES) solution 0.2 mg     hydrALAZINE (APRESOLINE) injection 20 mg     tacrolimus (PROGRAF - GENERIC EQUIVALENT) suspension 6 mg     amLODIPine (NORVASC) suspension 5 mg     0.9% sodium chloride infusion     valGANciclovir (VALCYTE) tablet 450 mg     senna-docusate (SENOKOT-S;PERICOLACE) 8.6-50 MG per tablet 2 tablet     polyethylene glycol (MIRALAX/GLYCOLAX) Packet 17 g     midazolam (VERSED) injection 1-2 mg     multivitamins with minerals (CERTAVITE/CEROVITE) liquid 15 mL     heparin sodium PF injection 5,000 Units     aspirin chewable tablet 81 mg     lidocaine (viscous) (XYLOCAINE) 2 % solution 5 mL     dextrose 10 % 1,000 mL infusion     pantoprazole (PROTONIX) suspension 40 mg     insulin 1 unit/mL in saline (NovoLIN, HumuLIN Regular) drip - ADULT IV Infusion     glucose 40 % gel  15-30 g     Or     dextrose 50 % injection 25-50 mL     Or     glucagon injection 1 mg     fludrocortisone (FLORINEF) tablet 0.1 mg     lidocaine 1 % 1 mL     lidocaine (LMX4) kit     sodium chloride (PF) 0.9% PF flush 3 mL     sodium chloride (PF) 0.9% PF flush 3 mL     ondansetron (ZOFRAN-ODT) ODT tab 4 mg     Or     ondansetron (ZOFRAN) injection 4 mg     bisacodyl (DULCOLAX) Suppository 10 mg     naloxone (NARCAN) injection 0.1-0.4 mg     acetaminophen (TYLENOL) tablet 650 mg     Or     acetaminophen (TYLENOL) Suppository 650 mg     potassium chloride SA (K-DUR/KLOR-CON M) CR tablet 20-40 mEq     potassium chloride (KLOR-CON) Packet 20-40 mEq     potassium chloride 10 mEq in 100 mL intermittent infusion     potassium chloride 10 mEq in 100 mL intermittent infusion with 10 mg lidocaine     potassium chloride 20 mEq in 50 mL intermittent infusion     sodium phosphate 10 mmol in D5W intermittent infusion     sodium phosphate 15 mmol in D5W intermittent infusion     sodium phosphate 20 mmol in D5W intermittent infusion     sodium phosphate 25 mmol in D5W intermittent infusion     magnesium sulfate 2 g in NS intermittent infusion (PharMEDium or FV Cmpd)     magnesium sulfate 4 g in 100 mL sterile water (premade)         Exam       /71 (BP Location: Right arm)  Pulse 99  Temp 99.3  F (37.4  C) (Oral)  Resp 20  Ht 1.829 m (6')  Wt 107 kg (235 lb 12.8 oz)  SpO2 95%  BMI 31.98 kg/m2    Constitutional : well-built, laying in bed, agitated  Head: atraumatic, anicteric. See neuroexam  Eyes: see neuroexam  CVC: sinus on monitor  LUng: tachypneic  Abdomen: distende  extremities: purple finger on R hand and purple toes on feet. Seems to have pain on arms and legs.   Neuroexam    Alert.  In the morning he was not following commands or talking. His mental status did improved during rounds. He still was not Follow commands, but could say words.   Eyes tracks. make  Less eye contact.   Face symmetric  Eyes: motility  preserved on horizontal axis, no nystagmus  He could elevates his arms and legs over gravity today.   Reflexes are preserved on arms, absent on legs.                  Data:       EEG: During two days of Video EEG patient had generalized continuous theta slowing and background slowing consistent with a moderate encephalopathy.  No electrographic seizure, paroxsymal behaviors, or epileptiform discharges were seen.     Bilirubin Direct 0.2  0.0 - 0.2 mg/dL Final 07/19/2017  6:21 AM 51   Bilirubin Total 0.7  0.2 - 1.3 mg/dL Final 07/19/2017  6:21 AM 51   Albumin 2.0 (L) 3.4 - 5.0 g/dL Final 07/19/2017  6:21 AM 51   Protein Total 5.9 (L) 6.8 - 8.8 g/dL Final 07/19/2017  6:21 AM 51   Alkaline Phosphatase 201 (H) 40 - 150 U/L Final 07/19/2017  6:21 AM 51   ALT 50  0 - 70 U/L Final 07/19/2017  6:21 AM 51   AST 60 (H) 0 - 45 U/L Final 07/19/2017  6:21 AM      CBC RESULTS:   Recent Labs   Lab Test  07/19/17   0621   WBC  5.6   RBC  2.81*   HGB  7.8*   HCT  25.0*   MCV  89   MCH  27.8   MCHC  31.2*   RDW  14.7   PLT  145*     Last Basic Metabolic Panel:  Lab Results   Component Value Date     07/19/2017      Lab Results   Component Value Date    POTASSIUM 5.9 07/19/2017     Lab Results   Component Value Date    CHLORIDE 114 07/19/2017     Lab Results   Component Value Date    JACOB 7.7 07/19/2017     Lab Results   Component Value Date    CO2 26 07/19/2017     Lab Results   Component Value Date    BUN 67 07/19/2017     Lab Results   Component Value Date    CR 1.48 07/19/2017     Lab Results   Component Value Date     07/19/2017                      Assessment and Plan:      52 yo man with DM2, 5 liver transplant (3/2017) for ESLD due to CASTAÑEDA and hepatocarcinoma  on Tacrolimus who is admitted on 7/4 after laparoscopy for  neutropenic colitis, EJ and septic shock with encephalopathy      Encephalopathy: Likely multifactorial. He is on ertapenem and tacrolimues, was recently under sedation beside infection and EJ, as  CMV viremia.  His encephalopathy is not improving, in a context of a a immunocompromised patient:  Neurology suggests:   - MRI brain with and without contrast    - consider LP is heparin can be discontinued.       Neurology will keep following      PAtient Seen and discussed with Dr. Glynn Mcmullen  PGy4 Neurology  3581   I saw the patient with the resident.  I agree with the resident note and plan of care.      Lauro Garza MD

## 2017-07-20 ENCOUNTER — APPOINTMENT (OUTPATIENT)
Dept: MRI IMAGING | Facility: CLINIC | Age: 52
End: 2017-07-20
Attending: PHYSICIAN ASSISTANT
Payer: COMMERCIAL

## 2017-07-20 ENCOUNTER — APPOINTMENT (OUTPATIENT)
Dept: OCCUPATIONAL THERAPY | Facility: CLINIC | Age: 52
End: 2017-07-20
Attending: TRANSPLANT SURGERY
Payer: COMMERCIAL

## 2017-07-20 LAB
ANION GAP SERPL CALCULATED.3IONS-SCNC: 5 MMOL/L (ref 3–14)
BASOPHILS # BLD AUTO: 0 10E9/L (ref 0–0.2)
BASOPHILS NFR BLD AUTO: 0.4 %
BUN SERPL-MCNC: 70 MG/DL (ref 7–30)
CALCIUM SERPL-MCNC: 8.1 MG/DL (ref 8.5–10.1)
CHLORIDE SERPL-SCNC: 116 MMOL/L (ref 94–109)
CO2 SERPL-SCNC: 25 MMOL/L (ref 20–32)
CREAT SERPL-MCNC: 1.67 MG/DL (ref 0.66–1.25)
DIFFERENTIAL METHOD BLD: ABNORMAL
EBV DNA # SPEC NAA+PROBE: ABNORMAL {COPIES}/ML
EBV DNA SPEC NAA+PROBE-LOG#: 5.6 {LOG_COPIES}/ML
EOSINOPHIL # BLD AUTO: 0.1 10E9/L (ref 0–0.7)
EOSINOPHIL NFR BLD AUTO: 1.5 %
ERYTHROCYTE [DISTWIDTH] IN BLOOD BY AUTOMATED COUNT: 14.8 % (ref 10–15)
GFR SERPL CREATININE-BSD FRML MDRD: 43 ML/MIN/1.7M2
GLUCOSE BLDC GLUCOMTR-MCNC: 107 MG/DL (ref 70–99)
GLUCOSE BLDC GLUCOMTR-MCNC: 110 MG/DL (ref 70–99)
GLUCOSE BLDC GLUCOMTR-MCNC: 114 MG/DL (ref 70–99)
GLUCOSE BLDC GLUCOMTR-MCNC: 117 MG/DL (ref 70–99)
GLUCOSE BLDC GLUCOMTR-MCNC: 118 MG/DL (ref 70–99)
GLUCOSE BLDC GLUCOMTR-MCNC: 166 MG/DL (ref 70–99)
GLUCOSE BLDC GLUCOMTR-MCNC: 61 MG/DL (ref 70–99)
GLUCOSE BLDC GLUCOMTR-MCNC: 71 MG/DL (ref 70–99)
GLUCOSE BLDC GLUCOMTR-MCNC: 84 MG/DL (ref 70–99)
GLUCOSE BLDC GLUCOMTR-MCNC: 90 MG/DL (ref 70–99)
GLUCOSE BLDC GLUCOMTR-MCNC: 96 MG/DL (ref 70–99)
GLUCOSE SERPL-MCNC: 97 MG/DL (ref 70–99)
HCT VFR BLD AUTO: 23.4 % (ref 40–53)
HGB BLD-MCNC: 7.4 G/DL (ref 13.3–17.7)
IMM GRANULOCYTES # BLD: 0 10E9/L (ref 0–0.4)
IMM GRANULOCYTES NFR BLD: 0.2 %
LYMPHOCYTES # BLD AUTO: 1.5 10E9/L (ref 0.8–5.3)
LYMPHOCYTES NFR BLD AUTO: 32.4 %
MCH RBC QN AUTO: 28.2 PG (ref 26.5–33)
MCHC RBC AUTO-ENTMCNC: 31.6 G/DL (ref 31.5–36.5)
MCV RBC AUTO: 89 FL (ref 78–100)
MONOCYTES # BLD AUTO: 0.4 10E9/L (ref 0–1.3)
MONOCYTES NFR BLD AUTO: 8 %
NEUTROPHILS # BLD AUTO: 2.7 10E9/L (ref 1.6–8.3)
NEUTROPHILS NFR BLD AUTO: 57.5 %
NRBC # BLD AUTO: 0 10*3/UL
NRBC BLD AUTO-RTO: 0 /100
PLATELET # BLD AUTO: 127 10E9/L (ref 150–450)
POTASSIUM SERPL-SCNC: 5 MMOL/L (ref 3.4–5.3)
POTASSIUM SERPL-SCNC: 5.4 MMOL/L (ref 3.4–5.3)
POTASSIUM SERPL-SCNC: 5.4 MMOL/L (ref 3.4–5.3)
POTASSIUM SERPL-SCNC: 5.5 MMOL/L (ref 3.4–5.3)
POTASSIUM SERPL-SCNC: 5.5 MMOL/L (ref 3.4–5.3)
RBC # BLD AUTO: 2.62 10E12/L (ref 4.4–5.9)
SODIUM SERPL-SCNC: 146 MMOL/L (ref 133–144)
WBC # BLD AUTO: 4.6 10E9/L (ref 4–11)

## 2017-07-20 PROCEDURE — 27210432 ZZH NUTRITION PRODUCT RENAL BASIC LITER

## 2017-07-20 PROCEDURE — 25000125 ZZHC RX 250

## 2017-07-20 PROCEDURE — 25000128 H RX IP 250 OP 636: Performed by: PHYSICIAN ASSISTANT

## 2017-07-20 PROCEDURE — 40000556 ZZH STATISTIC PERIPHERAL IV START W US GUIDANCE

## 2017-07-20 PROCEDURE — 25000132 ZZH RX MED GY IP 250 OP 250 PS 637: Performed by: TRANSPLANT SURGERY

## 2017-07-20 PROCEDURE — 25000132 ZZH RX MED GY IP 250 OP 250 PS 637

## 2017-07-20 PROCEDURE — 25000132 ZZH RX MED GY IP 250 OP 250 PS 637: Performed by: PHYSICIAN ASSISTANT

## 2017-07-20 PROCEDURE — 25000125 ZZHC RX 250: Performed by: PHYSICIAN ASSISTANT

## 2017-07-20 PROCEDURE — 25000128 H RX IP 250 OP 636: Performed by: STUDENT IN AN ORGANIZED HEALTH CARE EDUCATION/TRAINING PROGRAM

## 2017-07-20 PROCEDURE — 12000025 ZZH R&B TRANSPLANT INTERMEDIATE

## 2017-07-20 PROCEDURE — 36415 COLL VENOUS BLD VENIPUNCTURE: CPT

## 2017-07-20 PROCEDURE — 25000131 ZZH RX MED GY IP 250 OP 636 PS 637: Performed by: PHYSICIAN ASSISTANT

## 2017-07-20 PROCEDURE — 84132 ASSAY OF SERUM POTASSIUM: CPT

## 2017-07-20 PROCEDURE — A9585 GADOBUTROL INJECTION: HCPCS | Performed by: TRANSPLANT SURGERY

## 2017-07-20 PROCEDURE — 25000128 H RX IP 250 OP 636

## 2017-07-20 PROCEDURE — 25800025 ZZH RX 258

## 2017-07-20 PROCEDURE — 25800025 ZZH RX 258: Performed by: STUDENT IN AN ORGANIZED HEALTH CARE EDUCATION/TRAINING PROGRAM

## 2017-07-20 PROCEDURE — 25000132 ZZH RX MED GY IP 250 OP 250 PS 637: Performed by: SURGERY

## 2017-07-20 PROCEDURE — 85025 COMPLETE CBC W/AUTO DIFF WBC: CPT | Performed by: PHYSICIAN ASSISTANT

## 2017-07-20 PROCEDURE — 25800025 ZZH RX 258: Performed by: PHYSICIAN ASSISTANT

## 2017-07-20 PROCEDURE — 70553 MRI BRAIN STEM W/O & W/DYE: CPT

## 2017-07-20 PROCEDURE — 25000132 ZZH RX MED GY IP 250 OP 250 PS 637: Performed by: NURSE PRACTITIONER

## 2017-07-20 PROCEDURE — 80048 BASIC METABOLIC PNL TOTAL CA: CPT | Performed by: PHYSICIAN ASSISTANT

## 2017-07-20 PROCEDURE — 40000133 ZZH STATISTIC OT WARD VISIT: Performed by: OCCUPATIONAL THERAPIST

## 2017-07-20 PROCEDURE — 40000275 ZZH STATISTIC RCP TIME EA 10 MIN

## 2017-07-20 PROCEDURE — 97110 THERAPEUTIC EXERCISES: CPT | Mod: GO | Performed by: OCCUPATIONAL THERAPIST

## 2017-07-20 PROCEDURE — 00000146 ZZHCL STATISTIC GLUCOSE BY METER IP

## 2017-07-20 PROCEDURE — 25000131 ZZH RX MED GY IP 250 OP 636 PS 637: Performed by: CLINICAL NURSE SPECIALIST

## 2017-07-20 PROCEDURE — 84132 ASSAY OF SERUM POTASSIUM: CPT | Performed by: PHYSICIAN ASSISTANT

## 2017-07-20 PROCEDURE — 36415 COLL VENOUS BLD VENIPUNCTURE: CPT | Performed by: PHYSICIAN ASSISTANT

## 2017-07-20 PROCEDURE — 25000132 ZZH RX MED GY IP 250 OP 250 PS 637: Performed by: STUDENT IN AN ORGANIZED HEALTH CARE EDUCATION/TRAINING PROGRAM

## 2017-07-20 PROCEDURE — 94640 AIRWAY INHALATION TREATMENT: CPT

## 2017-07-20 PROCEDURE — 25000128 H RX IP 250 OP 636: Performed by: TRANSPLANT SURGERY

## 2017-07-20 RX ORDER — ALBUTEROL SULFATE 0.83 MG/ML
SOLUTION RESPIRATORY (INHALATION)
Status: COMPLETED
Start: 2017-07-20 | End: 2017-07-20

## 2017-07-20 RX ORDER — GADOBUTROL 604.72 MG/ML
10 INJECTION INTRAVENOUS ONCE
Status: COMPLETED | OUTPATIENT
Start: 2017-07-20 | End: 2017-07-20

## 2017-07-20 RX ORDER — FUROSEMIDE 10 MG/ML
40 INJECTION INTRAMUSCULAR; INTRAVENOUS ONCE
Status: COMPLETED | OUTPATIENT
Start: 2017-07-20 | End: 2017-07-20

## 2017-07-20 RX ORDER — FLUDROCORTISONE ACETATE 0.1 MG/1
100 TABLET ORAL 2 TIMES DAILY
Status: DISCONTINUED | OUTPATIENT
Start: 2017-07-20 | End: 2017-07-27

## 2017-07-20 RX ORDER — FUROSEMIDE 10 MG/ML
80 INJECTION INTRAMUSCULAR; INTRAVENOUS ONCE
Status: COMPLETED | OUTPATIENT
Start: 2017-07-20 | End: 2017-07-20

## 2017-07-20 RX ADMIN — FUROSEMIDE 40 MG: 10 INJECTION, SOLUTION INTRAVENOUS at 14:29

## 2017-07-20 RX ADMIN — FUROSEMIDE 80 MG: 10 INJECTION, SOLUTION INTRAVENOUS at 09:19

## 2017-07-20 RX ADMIN — Medication 25 G: at 00:28

## 2017-07-20 RX ADMIN — FUROSEMIDE 40 MG: 10 INJECTION, SOLUTION INTRAVENOUS at 02:02

## 2017-07-20 RX ADMIN — OXYCODONE HYDROCHLORIDE 10 MG: 5 SOLUTION ORAL at 11:05

## 2017-07-20 RX ADMIN — HEPARIN SODIUM 5000 UNITS: 5000 INJECTION, SOLUTION INTRAVENOUS; SUBCUTANEOUS at 19:37

## 2017-07-20 RX ADMIN — HUMAN INSULIN 10 UNITS: 100 INJECTION, SOLUTION SUBCUTANEOUS at 01:28

## 2017-07-20 RX ADMIN — Medication 80 MG: at 08:08

## 2017-07-20 RX ADMIN — DEXTROSE MONOHYDRATE: 100 INJECTION, SOLUTION INTRAVENOUS at 04:30

## 2017-07-20 RX ADMIN — FLUDROCORTISONE ACETATE 100 MCG: 0.1 TABLET ORAL at 19:34

## 2017-07-20 RX ADMIN — TACROLIMUS 7 MG: 5 CAPSULE ORAL at 17:53

## 2017-07-20 RX ADMIN — TACROLIMUS 7 MG: 5 CAPSULE ORAL at 08:08

## 2017-07-20 RX ADMIN — Medication 50 ML: at 02:53

## 2017-07-20 RX ADMIN — ALBUTEROL SULFATE 2.5 MG: 2.5 SOLUTION RESPIRATORY (INHALATION) at 04:30

## 2017-07-20 RX ADMIN — OXYCODONE HYDROCHLORIDE 10 MG: 5 SOLUTION ORAL at 02:01

## 2017-07-20 RX ADMIN — AMLODIPINE BESYLATE 5 MG: 10 TABLET ORAL at 08:08

## 2017-07-20 RX ADMIN — OXYCODONE HYDROCHLORIDE 10 MG: 5 SOLUTION ORAL at 17:53

## 2017-07-20 RX ADMIN — GADOBUTROL 10 ML: 604.72 INJECTION INTRAVENOUS at 12:29

## 2017-07-20 RX ADMIN — Medication 0.2 MG: at 19:34

## 2017-07-20 RX ADMIN — FLUDROCORTISONE ACETATE 100 MCG: 0.1 TABLET ORAL at 14:29

## 2017-07-20 RX ADMIN — INSULIN GLARGINE 18 UNITS: 100 INJECTION, SOLUTION SUBCUTANEOUS at 16:46

## 2017-07-20 RX ADMIN — CYCLOBENZAPRINE HYDROCHLORIDE 10 MG: 10 TABLET, FILM COATED ORAL at 21:31

## 2017-07-20 RX ADMIN — MULTIVIT AND MINERALS-FERROUS GLUCONATE 9 MG IRON/15 ML ORAL LIQUID 15 ML: at 08:08

## 2017-07-20 RX ADMIN — PANTOPRAZOLE SODIUM 40 MG: 40 TABLET, DELAYED RELEASE ORAL at 08:08

## 2017-07-20 RX ADMIN — GANCICLOVIR SODIUM 250 MG: 500 INJECTION, POWDER, LYOPHILIZED, FOR SOLUTION INTRAVENOUS at 19:29

## 2017-07-20 RX ADMIN — Medication 0.2 MG: at 08:08

## 2017-07-20 RX ADMIN — LIDOCAINE 2 PATCH: 50 PATCH CUTANEOUS at 19:41

## 2017-07-20 NOTE — PROGRESS NOTES
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  He has experienced increased hyperglycemia related to acute stress and enteral feedings.    Hypoglycemia after receiving regular insulin/D50W for hyperkalemia.  Prior to that, glucose tightly controlled but not low.  -monitor closely for hypoglycemia if regular insulin bolused for hyperkalemia  -okay to stop D10W 25 cc/h if glucose stable  -reduce glargine to 18 units today (from 25)  -NPH 28 units for Nepro 115 cc/h x 14 h, Order reads HOLD NPH if no TFs.----> ADDM 1600-  Pt will get TF for 8 hours starting now.  Give NPH 16 units x1 now. Pt may be hyperglycemic during TF infusion, but with shorter duration of cycle lower dose NPH to minimize risk hypoglycemia after midnight.  -aspart 1 unit per 10 grams carbohydrate if taking PO  -aspart high correction q4h     Outpatient diabetes follow up: Dr. Eckert at [facility]             Interval History:   The last 24 hours progress and nursing notes reviewed.  TF regimen is: Nepro 115 cc/h x 14 hours, but hasn't received yet.  NPO last night and doing bowel prep for colonoscopy.  Poor sleep.  Pt irritable and not tolerating rectal tube.  Timing of scope uncertain.  Thus, unknown whether TF will be given tonight.        Recent Labs  Lab 07/20/17  1107 07/20/17  0733 07/20/17  0645 07/20/17  0627 07/20/17  0518 07/20/17  0414 07/20/17  0342  07/19/17  0621  07/18/17  0624  07/17/17  0630  07/16/17  2142  07/16/17  0326   GLC  --   --  97  --   --   --   --   --  146*  --  131*  --  239*  --  83  --  128*   * 117*  --  107* 96 90 110*  < >  --   < >  --   < >  --   < >   --   < >  --    < > = values in this interval not displayed.            Review of Systems:   See interval hx          Medications:   PTA taking Januvia and glargine versus glargine and aspart per carb    Active Diet Order      NPO per Anesthesia Guidelines for Procedure/Surgery Except for: Meds     Physical Exam:  Gen:  resting in bed, in NAD   HEENT: NC/AT, mucous membranes are moist, NJ feeding tube  Resp: Unlabored  Neuro: asleep, and in need of rest so minimal effort made to awaken pt  /80 (BP Location: Right arm)  Pulse 92  Temp 99.2  F (37.3  C) (Axillary)  Resp 24  Ht 1.829 m (6')  Wt 107 kg (235 lb 12.8 oz)  SpO2 95%  BMI 31.98 kg/m2           Data:     Lab Results   Component Value Date    A1C  07/06/2017     Canceled, Test credited   Below Assay Range  NOTIFIED LEONARD ONEILL AT 0538 ON 7/6/17 BY CHRISSY      A1C 9.4 02/16/2017    A1C 6.2 12/14/2016    A1C 6.1 10/27/2016    A1C 8.3 07/02/2012            Recent Labs   Lab Test  07/20/17   1108  07/20/17   0645   07/19/17   0621   NA   --   146*   --   144   POTASSIUM  5.4*  5.5*   < >  5.9*   CHLORIDE   --   116*   --   114*   CO2   --   25   --   26   ANIONGAP   --   5   --   4   GLC   --   97   --   146*   BUN   --   70*   --   67*   CR   --   1.67*   --   1.48*   JACOB   --   8.1*   --   7.7*    < > = values in this interval not displayed.     CBC RESULTS:   Recent Labs   Lab Test  07/20/17   0645   WBC  4.6   RBC  2.62*   HGB  7.4*   HCT  23.4*   MCV  89   MCH  28.2   MCHC  31.6   RDW  14.8   PLT  127*     Janice Griffinpton APRN [id number]    Diabetes Management job code 1338

## 2017-07-20 NOTE — PLAN OF CARE
Problem: Goal Outcome Summary  Goal: Goal Outcome Summary  Outcome: Improving  AVSS on 2L, remained afebrile. Pt A/O x2, disoriented to time and situation at beginning of shift. Pt becoming less confused and speech is becoming clearer. Pt asking appropriate questions and more cooperative with care. Pt less anxious and need for sitter reviewed with team. Pt received oxy x1 prior to MRI.  & 118. K 5.5 this morning, recheck for 5.4. NPO to prep for colonoscopy. Cycled TF held overnight. NJ clamped. Multiple loose, incontinent bowel movements. Colonoscopy on hold due to high K levels. Good urine output in vazquez. R PIV and L PIV SL. Repositioned frequently and transferred using the lift. Pt now able to turn side to side in bed with little assistance. 2nd toe on L foot blackened a change from previous assessment. Team notified. Will continue to monitor and notify team of any changes as needed.

## 2017-07-20 NOTE — PLAN OF CARE
Problem: Goal Outcome Summary  Goal: Goal Outcome Summary  SLP: Dysphagia therapy cancelled.  Pt medically NPO for procedure this date.  ST to follow.

## 2017-07-20 NOTE — PROVIDER NOTIFICATION
Liver team notified in rounds that endocrinology suggested stopping D10 infusion (team okay with plan); pt can go for colonoscopy after K+ WNL (lasix given and recheck @ 1100); and plan for MRI in evening d/t full schedule.

## 2017-07-20 NOTE — PLAN OF CARE
Problem: Goal Outcome Summary  Goal: Goal Outcome Summary  OT/7A: Facilitated supine pal UE exercises with max encouragement, requiring assist to count reps. Pt unable to complete simple sequencing tasks. Pt limited by cognition, strength, and post op precautions     REC: TCU due to decreased cognition, strength, mobility, and ADL I

## 2017-07-20 NOTE — PLAN OF CARE
Problem: Goal Outcome Summary  Goal: Goal Outcome Summary  Outcome: Declining  Temp max 99.8 axillary, down to 99 axillary on own.  Respiratory rate 28.  Pt continues to be wheezy.  RT called, albuterol neb given with little change.    Hyperkalemia:  D50 and insulin given in that order).  Potassium recheck  5.4.  Blood glucose dropped to 61.    D50 given.    D10 started at 25cc/hr since pt is NPO and tube feeds are held.  (Pt received 25u of lantus yesterday.)  Last blood glucose 96.    Lasix 40mg IV given tonight.  Crowe with good urine output.    Pain continues despite getting oxycodone 10mg every 6 hours and flexeril at bedtime, lidoderm patches on back.  MD notified of heparin SQ order.  Dr. Pedersen ordered for heparin to be held due to colonoscopy today.  4 L Golytely infused through NJ.  Large amount of stool leaked around rectal tube.  Rectal tube removed.  Diaper on.  Stool is still brown.    Pt ordered for a head MRI.  Checklist sent down last evening.  Valium ordered.    Attendant at bedside to keep pt from pulling lines and staying in bed.    Pt continues to be confused and oriented to name and place only.

## 2017-07-20 NOTE — PLAN OF CARE
Problem: Goal Outcome Summary  Goal: Goal Outcome Summary  Outcome: No Change  VSS throughout shift. T-max 99.0. Sating 95 on 2L of oxygen. Patient received oxycodone x1 for pain. He denies nausea. He has been NPO all shift. Crowe is patent and draining with good output. Patient had a small bowel movement prior to having his rectal tube placed. Insulin drip was discontinued, blood sugars were 107 and 93. Patient's potassium rechecks this evening were 6.1, 6.3, and 5.9. Tube feeds are on hold this evening as the patient is NPO. Plan is for the patient to undergo a brain MRI tonight or tomorrow morning and the undergo a colonoscopy tomorrow. Patient's colonoscopy prep was started. Will continue to monitor and update the team as needed.

## 2017-07-20 NOTE — PROGRESS NOTES
Surgery Cross-Cover Note  7/19/2017 9:39 PM     Updated Dr Dacosta ( transplant fellow):  K: 6.1-->6.3  Patient is asymptomatic    Plan:  -To give insulin and D50 cocktail  - If repeat K is still above normal, will need another IV lasix around midnight     Cuca Pedersen MD  PGY-1 General Surgery  Pager # [phone]    (Please page the morning team after 6 am)

## 2017-07-20 NOTE — PROGRESS NOTES
Immunosuppression Note:    Camacho Bhagat is a 51 year old male who is seen today  for immunosuppression management     I, Jovan Tran MD, I have examined the patient with the resident/PA/Fellow, discussed and agree with the note and findings.  I have reviewed today's vital signs, medications, labs and imaging. I reviewed the immunosuppression medications and levels. 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     Graft Function: Good function. US liver normal doppler. Slight increase in Alk phos and AST, improved yesterday, no labs today. Tac dose increased at that time.   Immunosuppression Management:   CellCept 250 mg BID: Held since 6/27/17 due to neutropenia. Continue to hold.   Tac goal level 5-8. Since MMF held will aim for a goal closer to ~8. Prograf 7 mg BID, increased 7/18. 7/17 Level 5.1, 12.5 hr trough. Titrating up dose over past few days, poor absorption. Level to be done tomorrow.  Cardiorespiratory: Intubated for ex lap, initially requiring pressor support. HDS. Moderate right sided pleural effusion. Chest tube placed 7/9 that was removed 7/12. HDS.   -Difficult to have patient do aggressive pulmonary toilet. Albuterol/Mucomyst neb x 4 doses and started acapella. Patient to be OOB to chair as much as possible.   -CXR- pulmonary edema. RR increased. Lasix IV frequently  Hematology: Pancytopenia, acute on chronic, secondary to medications. MMF and bactrim held (since 6/27). Valcyte held on admission.  Neupogen 480 mcg given on 7/4 to 7/7,  CMV seronegative and donor seropositive.   -7/3 CMV PCR < 137. 7/10 CMV  7/18 CMV  copies. Valcyte ppx dose restarted and discontinued. Will start IV Ganciclovir today due to concern for CMV colitis. CMV level pending from 7/20 prior to initiation. Awaiting colonoscopy to evaluate for CMV colitis. Continue to hold MMF and bactrim.   -Anemia- HGB 7.4 today, stable. DERRELL negative, checked given spherocytes on P smear.    -Cytology ascites fluid, negative for malignancy   GI: Neutropenic colitis.   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 ID recommending colonoscopy, unable to complete today due to hyperkalemia. Will plan for tomorrow.   -NJ placed. TF Peptamen titrated at goal, will run from 4pm to MN tonight due to colonoscopy tomorrow.    -C diff negative.  -Start nectar thick full liquids. Aspiration precautions. SLP following.    Fluid/Electrolytes/Renal: EJ, secondary to hypoperfusion, sepsis. Cr 1.7<-1.5 (1.7). BUN 70. Good UO, 3.0 L yesterday. HypoNa, resolved. Decrease free water to maintenance rate. Hyperkalemia, PTA on florinef- HOLD today due to lasix. K stable. Avoid nephrotoxins.   Endocrine: DM II, on insulin gtt. Endocrine consulted to transition to subQ insulin.  Infectious disease: Started on Zosyn, Vancomycin, Flagyl, Micafungin on admission (7/4/2017). 7/5 Blood cultures, no growth thus far. Abdominal fluid culture collected intraoperatively, gram stain with no organisms, fungal and bacterial culture, GPCs. Bronchoalveolar lavage growing VRE, colonization. Tx ID consulted.  -Fever > 101 for several days, WBC 6.3, stable. 7/13 CT scan negative for abscess, large amount of ascites, mildly prominent lymph nodes-no significant change. 7/15 BAL cx and 7/14 ascites cx unremarkable per ID. Ascitic fluid exudate. Continue ertapenem x 14 days. Check EBV PCR. Recheck CMV PCR. If CMV PCR low copies would consider colonoscopy to eval for CMV colitis and if CMV PCR increased  copies would start treatment with IV ganciclovir.   -T max 24 hrs- 99.8  -CMV viremia - CMV D+R-, low viral count. IS reduced (MMF held). Started IV Ganciclovir today as noted above.   -Possible drug fever. Seroquel stopped due to this  Neuro: Delirium, hold, limit medications that have CNS SE.  Neurology consulted- dx likely toxic metabolic encephalopathy, improving slowly. VideoEEG showed mild to moderate encephalopathy. Assist patient with sleep hygiene.   Pain: General discomfort, chronic back pain. Patient very restless. Lidoderm patch for back pain.  Oxycodone 5 mg PRN. Restart PTA flexeril qHS   Vascular: Right Arterial line clot 2/2 previous   art line. Vascular consulted. Recommend ASA only. Some evidence of microvascular injury in digits (toes) this is most likely due to injury while patient was on pressor therapy and sepsis. Now with a third toe injury, vascular consulted again and will plan for bilateral US tonight, will evaluate patient tomorrow.   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   Activity: Daily PT/OT  Prophylaxis: PPI, DVT-SCD  Disposition: 7A.    Medical Decision Making: Medium  Admit 59830 (moderate level decision making)    PILLO/Fellow/Resident Provider: Luiza Wing PA-C [id number]    Faculty: Jovan Tran M.D.  __________________________________________________________________  Transplant History:.  3/4/2017 DD OLT with Dr. Tarn (Liver), Postoperative day: 138     Interval History: History is obtained from EMR and nursing staff.  Overnight events: No acute events o/n. General discomfort, mostly back pain. Difficult to have pt perform pulmonary toilet without assistance.      ROS:   A 10-point review of systems was negative except as noted above.    Meds:    fludrocortisone  100 mcg Oral BID     ganciclovir (CYTOVENE) intermittent infusion  2.5 mg/kg (Dosing Weight) Intravenous Q12H     insulin glargine  18 Units Subcutaneous Q24H     [START ON  7/21/2017] insulin isophane human  28 Units Subcutaneous QPM     insulin isophane human  16 Units Subcutaneous Once     insulin aspart  1-12 Units Subcutaneous Q4H     aspirin  80 mg Oral Daily     lidocaine  1-2 patch Transdermal Q24h    And     lidocaine   Transdermal Q24H    And     lidocaine   Transdermal Q8H     cyclobenzaprine  10 mg Oral At Bedtime     insulin aspart   Subcutaneous TID w/meals     tacrolimus  7 mg Oral BID IS     cloNIDine  0.2 mg Oral BID     amLODIPine  5 mg Oral Daily     multivitamins with minerals  15 mL Per Feeding Tube Daily     heparin  5,000 Units Subcutaneous Q8H     lidocaine (viscous)  5 mL Topical Once     pantoprazole  40 mg Oral or Feeding Tube Daily     sodium chloride (PF)  3 mL Intracatheter Q8H       Physical Exam:     Admit Weight: 94.2 kg (207 lb 11.2 oz) (SCALE 2)    Current vitals:   /76 (BP Location: Right arm)  Pulse 86  Temp 98.7  F (37.1  C) (Oral)  Resp 20  Ht 1.829 m (6')  Wt 107 kg (235 lb 12.8 oz)  SpO2 93%  BMI 31.98 kg/m2    Vital sign ranges:    Temp:  [98  F (36.7  C)-99.8  F (37.7  C)] 98.7  F (37.1  C)  Pulse:  [86-96] 86  Heart Rate:  [83-95] 86  Resp:  [20-28] 20  BP: (131-147)/(76-83) 134/76  SpO2:  [93 %-98 %] 93 %  Patient Vitals for the past 24 hrs:   BP Temp Temp src Pulse Heart Rate Resp SpO2   07/20/17 1555 134/76 98.7  F (37.1  C) Oral - 86 20 93 %   07/20/17 1258 131/83 98  F (36.7  C) Axillary 86 86 24 93 %   07/20/17 0728 137/80 99.2  F (37.3  C) Axillary 92 92 24 95 %   07/20/17 0529 - 99  F (37.2  C) Axillary - - - -   07/20/17 0353 141/80 99.8  F (37.7  C) Axillary - 95 28 96 %   07/19/17 2345 145/76 99  F (37.2  C) Oral - 83 20 94 %   07/19/17 1957 147/80 99  F (37.2  C) Oral 96 - 20 96 %   07/19/17 1750 134/79 98.4  F (36.9  C) Oral - 93 20 98 %     General Appearance: NAD  Skin: normal, warm, dry  Heart: RRR  Lungs: B/l crackles, non labored on 2L NC. Weak cough.   Abdomen: The abdomen is soft, midline incision is c/d/i and  covered. Chevron incision well healed.   : vazquez is present, yellow urine in bag.   Extremities:BLE trace edema.  Neurologic: alert, orientated x 1-2. No tremor. Minimal responses to questions.     Data:   CMP    Recent Labs  Lab 07/20/17  1427 07/20/17  1108 07/20/17  0645  07/19/17  0621 07/18/17  0624  07/16/17  0326  07/14/17  1517 07/14/17  0349   NA  --   --  146*  --  144 142  < > 148*  < >  --  144   POTASSIUM 5.5* 5.4* 5.5*  < > 5.9* 5.2  < > 5.1  < >  --  4.4   CHLORIDE  --   --  116*  --  114* 113*  < > 123*  < >  --  116*   CO2  --   --  25  --  26 24  < > 22  < >  --  20   GLC  --   --  97  --  146* 131*  < > 128*  < >  --  187*   BUN  --   --  70*  --  67* 63*  < > 71*  < >  --  84*   CR  --   --  1.67*  --  1.48* 1.46*  < > 1.66*  < >  --  1.74*   GFRESTIMATED  --   --  43*  --  50* 51*  < > 44*  < >  --  41*   GFRESTBLACK  --   --  52*  --  60* 61  < > 53*  < >  --  50*   JACOB  --   --  8.1*  --  7.7* 7.7*  < > 7.8*  < >  --  8.0*   MAG  --   --   --   --   --   --   --  2.4*  --   --  2.2   PHOS  --   --   --   --   --   --   --  3.8  --   --  3.7   ALBUMIN  --   --   --   --  2.0* 1.9*  < > 1.8*  --   --  2.0*   BILITOTAL  --   --   --   --  0.7 0.6  < > 0.3  --   --  0.5   ALKPHOS  --   --   --   --  201* 171*  < > 134  --   --  171*   AST  --   --   --   --  60* 88*  < > 72*  --   --  68*   ALT  --   --   --   --  50 61  < > 44  --   --  36   FAMY  --   --   --   --   --   --   --   --   --  6  --    < > = values in this interval not displayed.  CBC    Recent Labs  Lab 07/20/17  0645 07/19/17  0621   HGB 7.4* 7.8*   WBC 4.6 5.6   * 145*     COAGS    Recent Labs  Lab 07/17/17  0630   INR 1.19*      Urinalysis  Recent Labs   Lab Test  07/19/17   1150  07/11/17   1105   06/14/17   1508   04/11/16   1345   COLOR  Yellow  Yellow   < >   --    < >  Yellow   APPEARANCE  Slightly Cloudy  Clear   < >   --    < >  Clear   URINEGLC  Negative  Negative   < >   --    < >  30*   URINEBILI  Negative   Negative   < >   --    < >  Negative   URINEKETONE  Negative  Negative   < >   --    < >  Negative   SG  1.009  1.009   < >   --    < >  1.016   UBLD  Moderate*  Negative   < >   --    < >  Small*   URINEPH  5.0  5.0   < >   --    < >  5.0   PROTEIN  10*  10*   < >   --    < >  30*   NITRITE  Negative  Negative   < >   --    < >  Negative   LEUKEST  Negative  Negative   < >   --    < >  Negative   RBCU  6*  <1   < >   --    < >  1   WBCU  2  <1   < >   --    < >  1   UTPG   --    --    --   1.55*   --   0.41*    < > = values in this interval not displayed.       Cultures:   Blood Cultures x2 7/4/2017 NGTD     Abdominal Fluid Culture 7/4/2017: NGTD    Abdominal Fluid Culture 7/5/17: NGTD    Bronchoalveolar Culture 7/5/17: VRE.     Sputum endotracheal gm stain/culture 7/11/17: >25 PMNs/low power field   Few Mixed gram positive elvie    CT scan:    7/4/2017 CONCLUSION:   1. Right colonic wall thickening suggesting colitis. Follow-up is necessary to confirm resolution in order to exclude colonic mass.  2. Transverse colon is not well distended reducing evaluation for wall thickening.  3. Small amount of ascites.  4. Splenomegaly.  5. Prominent portal and splenic veins. If confirmation of vascular patency is indicated consider follow-up ultrasound of the liver with Doppler.  6. Small right pleural effusion.  7. Stranding in the subcutaneous fat of the ventral pelvis.     Abdominal XR 7/4/2017:   1. No evidence of pneumoperitoneum.  2. Dilated loop of bowel in the central abdomen, likely sigmoid.    XR Chest Portable 1 View 7/4/17:  1. Endotracheal tube tip projects 5.3 cm from the chacorta.  2. Increased bilateral pleural effusions, right greater than left.  3. Unchanged bibasilar patchy opacities.

## 2017-07-21 ENCOUNTER — APPOINTMENT (OUTPATIENT)
Dept: ULTRASOUND IMAGING | Facility: CLINIC | Age: 52
End: 2017-07-21
Attending: NURSE PRACTITIONER
Payer: COMMERCIAL

## 2017-07-21 ENCOUNTER — RESULTS ONLY (OUTPATIENT)
Dept: GASTROENTEROLOGY | Facility: CLINIC | Age: 52
End: 2017-07-21

## 2017-07-21 ENCOUNTER — APPOINTMENT (OUTPATIENT)
Dept: CARDIOLOGY | Facility: CLINIC | Age: 52
End: 2017-07-21
Attending: SURGERY
Payer: COMMERCIAL

## 2017-07-21 ENCOUNTER — APPOINTMENT (OUTPATIENT)
Dept: OCCUPATIONAL THERAPY | Facility: CLINIC | Age: 52
End: 2017-07-21
Attending: TRANSPLANT SURGERY
Payer: COMMERCIAL

## 2017-07-21 LAB
ALBUMIN SERPL-MCNC: 1.9 G/DL (ref 3.4–5)
ALP SERPL-CCNC: 297 U/L (ref 40–150)
ALT SERPL W P-5'-P-CCNC: 51 U/L (ref 0–70)
ANION GAP SERPL CALCULATED.3IONS-SCNC: 8 MMOL/L (ref 3–14)
AST SERPL W P-5'-P-CCNC: 75 U/L (ref 0–45)
BACTERIA SPEC CULT: NO GROWTH
BACTERIA SPEC CULT: NO GROWTH
BACTERIA SPEC CULT: NORMAL
BACTERIA SPEC CULT: NORMAL
BASOPHILS # BLD AUTO: 0 10E9/L (ref 0–0.2)
BASOPHILS NFR BLD AUTO: 0.5 %
BILIRUB DIRECT SERPL-MCNC: 0.2 MG/DL (ref 0–0.2)
BILIRUB SERPL-MCNC: 0.5 MG/DL (ref 0.2–1.3)
BUN SERPL-MCNC: 70 MG/DL (ref 7–30)
CALCIUM SERPL-MCNC: 8.2 MG/DL (ref 8.5–10.1)
CHLORIDE SERPL-SCNC: 117 MMOL/L (ref 94–109)
CMV DNA SPEC NAA+PROBE-ACNC: 1906 [IU]/ML
CMV DNA SPEC NAA+PROBE-LOG#: 3.3 {LOG_IU}/ML
CO2 SERPL-SCNC: 25 MMOL/L (ref 20–32)
COLONOSCOPY: NORMAL
CREAT SERPL-MCNC: 1.66 MG/DL (ref 0.66–1.25)
DIFFERENTIAL METHOD BLD: ABNORMAL
EOSINOPHIL # BLD AUTO: 0.1 10E9/L (ref 0–0.7)
EOSINOPHIL NFR BLD AUTO: 1.6 %
ERYTHROCYTE [DISTWIDTH] IN BLOOD BY AUTOMATED COUNT: 15 % (ref 10–15)
GFR SERPL CREATININE-BSD FRML MDRD: 44 ML/MIN/1.7M2
GLUCOSE BLDC GLUCOMTR-MCNC: 100 MG/DL (ref 70–99)
GLUCOSE BLDC GLUCOMTR-MCNC: 108 MG/DL (ref 70–99)
GLUCOSE BLDC GLUCOMTR-MCNC: 112 MG/DL (ref 70–99)
GLUCOSE BLDC GLUCOMTR-MCNC: 141 MG/DL (ref 70–99)
GLUCOSE BLDC GLUCOMTR-MCNC: 161 MG/DL (ref 70–99)
GLUCOSE BLDC GLUCOMTR-MCNC: 89 MG/DL (ref 70–99)
GLUCOSE BLDC GLUCOMTR-MCNC: 89 MG/DL (ref 70–99)
GLUCOSE BLDC GLUCOMTR-MCNC: 93 MG/DL (ref 70–99)
GLUCOSE SERPL-MCNC: 81 MG/DL (ref 70–99)
HCT VFR BLD AUTO: 22.8 % (ref 40–53)
HGB BLD-MCNC: 7 G/DL (ref 13.3–17.7)
IMM GRANULOCYTES # BLD: 0 10E9/L (ref 0–0.4)
IMM GRANULOCYTES NFR BLD: 0.5 %
INR PPP: 1.24 (ref 0.86–1.14)
INTERPRETATION ECG - MUSE: NORMAL
LACTATE BLD-SCNC: 0.7 MMOL/L (ref 0.7–2.1)
LYMPHOCYTES # BLD AUTO: 1.2 10E9/L (ref 0.8–5.3)
LYMPHOCYTES NFR BLD AUTO: 31.9 %
Lab: NORMAL
MCH RBC QN AUTO: 27.5 PG (ref 26.5–33)
MCHC RBC AUTO-ENTMCNC: 30.7 G/DL (ref 31.5–36.5)
MCV RBC AUTO: 89 FL (ref 78–100)
MICRO REPORT STATUS: NORMAL
MONOCYTES # BLD AUTO: 0.3 10E9/L (ref 0–1.3)
MONOCYTES NFR BLD AUTO: 8 %
NEUTROPHILS # BLD AUTO: 2.2 10E9/L (ref 1.6–8.3)
NEUTROPHILS NFR BLD AUTO: 57.5 %
NRBC # BLD AUTO: 0 10*3/UL
NRBC BLD AUTO-RTO: 0 /100
PLATELET # BLD AUTO: 118 10E9/L (ref 150–450)
POTASSIUM SERPL-SCNC: 4.9 MMOL/L (ref 3.4–5.3)
POTASSIUM SERPL-SCNC: 5 MMOL/L (ref 3.4–5.3)
PROT SERPL-MCNC: 5.7 G/DL (ref 6.8–8.8)
RBC # BLD AUTO: 2.55 10E12/L (ref 4.4–5.9)
SODIUM SERPL-SCNC: 149 MMOL/L (ref 133–144)
SODIUM SERPL-SCNC: 150 MMOL/L (ref 133–144)
SPECIMEN SOURCE: ABNORMAL
SPECIMEN SOURCE: NORMAL
TACROLIMUS BLD-MCNC: 4.7 UG/L (ref 5–15)
TME LAST DOSE: ABNORMAL H
WBC # BLD AUTO: 3.9 10E9/L (ref 4–11)

## 2017-07-21 PROCEDURE — 40000274 ZZH STATISTIC RCP CONSULT EA 30 MIN

## 2017-07-21 PROCEDURE — 12000025 ZZH R&B TRANSPLANT INTERMEDIATE

## 2017-07-21 PROCEDURE — 25000128 H RX IP 250 OP 636: Performed by: NURSE PRACTITIONER

## 2017-07-21 PROCEDURE — 25000131 ZZH RX MED GY IP 250 OP 636 PS 637: Performed by: CLINICAL NURSE SPECIALIST

## 2017-07-21 PROCEDURE — 80048 BASIC METABOLIC PNL TOTAL CA: CPT | Performed by: PHYSICIAN ASSISTANT

## 2017-07-21 PROCEDURE — 85025 COMPLETE CBC W/AUTO DIFF WBC: CPT | Performed by: PHYSICIAN ASSISTANT

## 2017-07-21 PROCEDURE — 93922 UPR/L XTREMITY ART 2 LEVELS: CPT

## 2017-07-21 PROCEDURE — 25000132 ZZH RX MED GY IP 250 OP 250 PS 637: Performed by: NURSE PRACTITIONER

## 2017-07-21 PROCEDURE — 25000128 H RX IP 250 OP 636: Performed by: PHYSICIAN ASSISTANT

## 2017-07-21 PROCEDURE — 93321 DOPPLER ECHO F-UP/LMTD STD: CPT | Mod: 26 | Performed by: INTERNAL MEDICINE

## 2017-07-21 PROCEDURE — 97535 SELF CARE MNGMENT TRAINING: CPT | Mod: GO

## 2017-07-21 PROCEDURE — 25000132 ZZH RX MED GY IP 250 OP 250 PS 637: Performed by: STUDENT IN AN ORGANIZED HEALTH CARE EDUCATION/TRAINING PROGRAM

## 2017-07-21 PROCEDURE — 93308 TTE F-UP OR LMTD: CPT

## 2017-07-21 PROCEDURE — 93308 TTE F-UP OR LMTD: CPT | Mod: 26 | Performed by: INTERNAL MEDICINE

## 2017-07-21 PROCEDURE — 36415 COLL VENOUS BLD VENIPUNCTURE: CPT | Performed by: PHYSICIAN ASSISTANT

## 2017-07-21 PROCEDURE — 25000125 ZZHC RX 250: Performed by: PHYSICIAN ASSISTANT

## 2017-07-21 PROCEDURE — 25000131 ZZH RX MED GY IP 250 OP 636 PS 637: Performed by: PHYSICIAN ASSISTANT

## 2017-07-21 PROCEDURE — 27210432 ZZH NUTRITION PRODUCT RENAL BASIC LITER

## 2017-07-21 PROCEDURE — 93325 DOPPLER ECHO COLOR FLOW MAPG: CPT | Mod: 26 | Performed by: INTERNAL MEDICINE

## 2017-07-21 PROCEDURE — 83605 ASSAY OF LACTIC ACID: CPT | Performed by: TRANSPLANT SURGERY

## 2017-07-21 PROCEDURE — 88305 TISSUE EXAM BY PATHOLOGIST: CPT | Performed by: INTERNAL MEDICINE

## 2017-07-21 PROCEDURE — 25000132 ZZH RX MED GY IP 250 OP 250 PS 637: Performed by: SURGERY

## 2017-07-21 PROCEDURE — 25000128 H RX IP 250 OP 636: Performed by: STUDENT IN AN ORGANIZED HEALTH CARE EDUCATION/TRAINING PROGRAM

## 2017-07-21 PROCEDURE — 25000132 ZZH RX MED GY IP 250 OP 250 PS 637: Performed by: PHYSICIAN ASSISTANT

## 2017-07-21 PROCEDURE — 36415 COLL VENOUS BLD VENIPUNCTURE: CPT | Performed by: TRANSPLANT SURGERY

## 2017-07-21 PROCEDURE — 45380 COLONOSCOPY AND BIOPSY: CPT | Performed by: INTERNAL MEDICINE

## 2017-07-21 PROCEDURE — 25000132 ZZH RX MED GY IP 250 OP 250 PS 637: Performed by: TRANSPLANT SURGERY

## 2017-07-21 PROCEDURE — 00000146 ZZHCL STATISTIC GLUCOSE BY METER IP

## 2017-07-21 PROCEDURE — 80197 ASSAY OF TACROLIMUS: CPT | Performed by: PHYSICIAN ASSISTANT

## 2017-07-21 PROCEDURE — 93925 LOWER EXTREMITY STUDY: CPT

## 2017-07-21 PROCEDURE — 99153 MOD SED SAME PHYS/QHP EA: CPT | Performed by: INTERNAL MEDICINE

## 2017-07-21 PROCEDURE — 40000133 ZZH STATISTIC OT WARD VISIT

## 2017-07-21 PROCEDURE — 99152 MOD SED SAME PHYS/QHP 5/>YRS: CPT | Performed by: INTERNAL MEDICINE

## 2017-07-21 PROCEDURE — 25000128 H RX IP 250 OP 636: Performed by: INTERNAL MEDICINE

## 2017-07-21 PROCEDURE — 85610 PROTHROMBIN TIME: CPT | Performed by: PHYSICIAN ASSISTANT

## 2017-07-21 PROCEDURE — 80076 HEPATIC FUNCTION PANEL: CPT | Performed by: PHYSICIAN ASSISTANT

## 2017-07-21 PROCEDURE — 84295 ASSAY OF SERUM SODIUM: CPT | Performed by: PHYSICIAN ASSISTANT

## 2017-07-21 PROCEDURE — 97530 THERAPEUTIC ACTIVITIES: CPT | Mod: GO

## 2017-07-21 RX ORDER — DEXTROSE MONOHYDRATE 50 MG/ML
INJECTION, SOLUTION INTRAVENOUS CONTINUOUS
Status: DISCONTINUED | OUTPATIENT
Start: 2017-07-21 | End: 2017-07-23

## 2017-07-21 RX ORDER — FENTANYL CITRATE 50 UG/ML
INJECTION, SOLUTION INTRAMUSCULAR; INTRAVENOUS PRN
Status: DISCONTINUED | OUTPATIENT
Start: 2017-07-21 | End: 2017-07-21 | Stop reason: HOSPADM

## 2017-07-21 RX ADMIN — CYCLOBENZAPRINE HYDROCHLORIDE 10 MG: 10 TABLET, FILM COATED ORAL at 20:28

## 2017-07-21 RX ADMIN — Medication 0.2 MG: at 20:24

## 2017-07-21 RX ADMIN — HEPARIN SODIUM 5000 UNITS: 5000 INJECTION, SOLUTION INTRAVENOUS; SUBCUTANEOUS at 20:25

## 2017-07-21 RX ADMIN — FLUDROCORTISONE ACETATE 100 MCG: 0.1 TABLET ORAL at 08:43

## 2017-07-21 RX ADMIN — LIDOCAINE 2 PATCH: 50 PATCH CUTANEOUS at 20:57

## 2017-07-21 RX ADMIN — TACROLIMUS 7 MG: 5 CAPSULE ORAL at 18:18

## 2017-07-21 RX ADMIN — SODIUM CHLORIDE: 9 INJECTION, SOLUTION INTRAVENOUS at 00:26

## 2017-07-21 RX ADMIN — Medication 0.2 MG: at 08:44

## 2017-07-21 RX ADMIN — GANCICLOVIR SODIUM 250 MG: 500 INJECTION, POWDER, LYOPHILIZED, FOR SOLUTION INTRAVENOUS at 18:12

## 2017-07-21 RX ADMIN — GANCICLOVIR SODIUM 250 MG: 500 INJECTION, POWDER, LYOPHILIZED, FOR SOLUTION INTRAVENOUS at 05:45

## 2017-07-21 RX ADMIN — FLUDROCORTISONE ACETATE 100 MCG: 0.1 TABLET ORAL at 20:24

## 2017-07-21 RX ADMIN — HEPARIN SODIUM 5000 UNITS: 5000 INJECTION, SOLUTION INTRAVENOUS; SUBCUTANEOUS at 11:38

## 2017-07-21 RX ADMIN — HEPARIN SODIUM 5000 UNITS: 5000 INJECTION, SOLUTION INTRAVENOUS; SUBCUTANEOUS at 03:59

## 2017-07-21 RX ADMIN — MULTIVIT AND MINERALS-FERROUS GLUCONATE 9 MG IRON/15 ML ORAL LIQUID 15 ML: at 08:44

## 2017-07-21 RX ADMIN — TACROLIMUS 7 MG: 5 CAPSULE ORAL at 08:43

## 2017-07-21 RX ADMIN — PANTOPRAZOLE SODIUM 40 MG: 40 TABLET, DELAYED RELEASE ORAL at 08:44

## 2017-07-21 RX ADMIN — POLYETHYLENE GLYCOL 3350, SODIUM SULFATE ANHYDROUS, SODIUM BICARBONATE, SODIUM CHLORIDE, POTASSIUM CHLORIDE 2000 ML: 236; 22.74; 6.74; 5.86; 2.97 POWDER, FOR SOLUTION ORAL at 11:38

## 2017-07-21 RX ADMIN — Medication 80 MG: at 08:43

## 2017-07-21 RX ADMIN — DEXTROSE MONOHYDRATE: 50 INJECTION, SOLUTION INTRAVENOUS at 11:37

## 2017-07-21 RX ADMIN — AMLODIPINE BESYLATE 5 MG: 10 TABLET ORAL at 08:44

## 2017-07-21 RX ADMIN — INSULIN HUMAN 28 UNITS: 100 INJECTION, SUSPENSION SUBCUTANEOUS at 20:23

## 2017-07-21 NOTE — PROGRESS NOTES
[facility]    Transplant Infectious Diseases Inpatient Progress note      Camacho Bhagat MRN# [id number]   YOB: 1965 Age: 51 year old   Date of Admission: 7/4/2017  4:45 AM  Transplant: 3/4/2017 (Liver), Postoperative day: 139            Recommendations:   1. Continue GCV IV adjusted to kidney function.   2. Colonoscopy with biopsy to rule out CMV colitis also make sure no GI PTLD.   3. Repeat CMV PCR 7/27/2017.   4. If LP to be done please send for cell count with diff, protein, and CMV PCR (in addition to neurology recommendations).  5. EBV PCR every other week.     Dr. Holloway is available over the weekend for questions, I will resume the patient's care on Monday.         Summary of Presentation:   Transplants:  3/4/2017 (Liver), Postoperative day:  139     This patient is a 51 year old male with CASTAÑEDA s/p OLT in 3/2017. Basilixima for induction. Now on TAC, MMF was held due to neutropenia and multiple infections.   Presented with colitis, s/p exploratory laparotomy.        Active Problems and Infectious Diseases Issues:   1. SIRS with fever (resolved)  2. Neutropenic vs CMV vs PTLD colitis (without ischemia per exp lap)   3. CMV primary infection (donor derived)  4. EBV primary infection (donor derived)  The fever resolved.    The fever was likely due to resolving colitis and ischemic injury to extremities with occlusion of right radial artery.   Finished 14 days of ertapenem on 7/18/2017.   The ascitic fluid analysis is c/w exudate but cx negative to date. That could have been due to inflamed bowels.     The neutropenia was attributed to drugs (MMF/bactrim/VGCV, all were DCed prior to presentation).     The primary CMV infection is donor derived following discontinuation of VGCV and in part due to acute illness as well.   Whether the patient also has CMV colitis is not clear but given recent colitis, I feel it's reasonable to rule out CMV colitis with colonoscopy.   The  viral load is slowly rising (increased by 0.8 log within 8 days).   IV GCV was initiated 7/20/2017. We should continue to follow weekly CMV PCR.   VGCV ppx was DCed in the past prior to admission due to neutropenia, then resumed briefly from 7/15/2017 till 7/19/2017.   The CMV in the BAL is of no clinical significance.     Colonoscopy should also help ruling out GI PTLD.   The CT of abdomen did show LA but that could have resulted from the colitis.   Ascitic fluid cytology and leukemia/lymphoma studies negative.   MRI of brain not suggestive of CNS PTLD.     5. Encephalopathy.   Likely due to sepsis.   Seems to be improving.   With negative MRI of brain for lesions.     6. 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    These are likely all colonizers for contaminants.     7. Staphylococcus capitis in ascitic fluid 7/5/2017   Contamination.     8. Rhinovirus in BAL 7/15/2017  Likely to be due to chronic shedding since the patient's main presentation was mostly abdominal not respiratory.      Other Infectious Disease issues include  - PCP prophylaxis: bactrim DCed in June of 2017 due to neutropenia.   - Serostatus: CMV D+/R-, EBV D+/R-, HSV1?/2?, VZV ?  - Immunization status: up-to-date  - Gamma globulin status: not recently checked.     Discussed with primary team.   Attestation:  I interviewed the patient and obtained history from the patient, and by reviewing the patient's chart including outside records, microbiological data, and radiological data. All data are summarized in this notes.  Naseem Figueroa   Pager: [phone]  07/21/2017        Interim History:  Patient is less confused today then yesterday, his conversation is more relevant and coherent.      HPI:  In summary:  CASTAÑEDA s/p OLT in 3/2017.   Presented with abdominal pain and underwent exploratory laparotomy which was not c/w with ischemia. It was felt to be related to  neutropenia.   The patient has neutropenia since June of 2017 attributed to immunosuppressive therapy/bactrim/VGCV.   Bactrim DCed June of 2017 and VGCV was DCed at unknown tome.     The patient was started on broad spectrum ABx with persistent fever.   Bronchoscopy done 7/12/2017 grew single colony of VRE which was considered a colonizer.   Ascitic fluid checked on 7/5/2017 and grew S capitis considered a contaminant.   Repeat BAL on 7/15/2017 and repeat paracentesis on 7/7/14/2017 are so far unremarkable for growth but the ascitic fluid is an exudate.     The course was complicated by thrombosis of the right radial artery with ischemia to to the tip of the right index finger.   He also has ischemia to the right hallux and second toe.       ROS:  ROS was unobtainalbe due the patient's poor mentation.                  Pysical Examination:  Temp: 100.6  F (38.1  C) Temp src: Oral BP: 160/83 Pulse: 87 Heart Rate: 91 Resp: 20 SpO2: 94 % O2 Device: Nasal cannula Oxygen Delivery: 3 LPM  Vitals:    07/15/17 0000 07/15/17 1200 07/16/17 0600 07/17/17 0600   Weight: 94.5 kg (208 lb 5.4 oz) 92.9 kg (204 lb 12.9 oz) 93.5 kg (206 lb 2.1 oz) 93.2 kg (205 lb 7.5 oz)    07/19/17 0936   Weight: 107 kg (235 lb 12.8 oz)     Constitutional: awake, less confused today then yesterday, his conversation is more relevant and coherent.    CVS: RRR, normal S1/S2, no murmur.   Abdomen: generalized tenderness, distended, no masses, no bruit, normal BS.   Extremities: +1 pitting edema of bilateral lower extremities, ischemic right hallux and right second toe, also ischemic tip of right index, no ulcers, normal ROM of all joints, no swelling or erythema of any of joints and no tenderness to palpation.   Skin: as the exam of the extremities, no induration, fluctuation or discharge,and no rash       Medications:  Medications that Require Transfusion:     D5W 100 mL/hr at 07/21/17 1137     NaCl 10 mL/hr at 07/21/17 0026     IV fluid REPLACEMENT  ONLY 25 mL/hr at 07/20/17 0430   Scheduled Medications:     insulin glargine  20 Units Subcutaneous Q24H     fludrocortisone  100 mcg Oral BID     ganciclovir (CYTOVENE) intermittent infusion  2.5 mg/kg (Dosing Weight) Intravenous Q12H     insulin isophane human  28 Units Subcutaneous QPM     insulin aspart  1-12 Units Subcutaneous Q4H     aspirin  80 mg Oral Daily     lidocaine  1-2 patch Transdermal Q24h    And     lidocaine   Transdermal Q24H    And     lidocaine   Transdermal Q8H     cyclobenzaprine  10 mg Oral At Bedtime     insulin aspart   Subcutaneous TID w/meals     tacrolimus  7 mg Oral BID IS     cloNIDine  0.2 mg Oral BID     amLODIPine  5 mg Oral Daily     multivitamins with minerals  15 mL Per Feeding Tube Daily     heparin  5,000 Units Subcutaneous Q8H     lidocaine (viscous)  5 mL Topical Once     pantoprazole  40 mg Oral or Feeding Tube Daily     sodium chloride (PF)  3 mL Intracatheter Q8H       Laboratory Data:   No results found for: ACD4    Inflammatory Markers    Recent Labs   Lab Test  07/05/17   1810  03/05/17   0358  11/23/16   0813  11/16/16   0706  11/15/16   1039  11/07/16   0759  11/03/16   0949  11/01/16   0853   08/15/11   1151  04/11/11   1209  01/03/11   1246  02/15/10   1232   SED   --    --   45*   --    --    --    --    --    --   11  14  17*  25*   CRP  354.0  20.0*  58.0*  59.1*  49.8*  66.0*  140.0*  150.0*   < >  11.1*  9.0*  11.7*  17.5*    < > = values in this interval not displayed.       Immune Globulin Studies     Recent Labs   Lab Test  08/15/11   1243   IGG  1160   IGG1  734   IGG2  294   IGG3  7*   IGG4  59       Metabolic Studies       Recent Labs   Lab Test  07/21/17   0615  07/20/17   2349  07/20/17   2018  07/20/17   1427  07/20/17   1108  07/20/17   0645   07/19/17   0621  07/19/17   0109  07/18/17   0624  07/17/17   0630  07/16/17   2142  07/16/17   0326   07/14/17   0349   07/11/17   0328   07/06/17   0316   NA  150*   --    --    --    --   146*   --   144    --   142  142  148*  148*   < >  144   < >  150*   < >  143   POTASSIUM  4.9  5.0  5.0  5.5*  5.4*  5.5*   < >  5.9*   --   5.2  5.1  5.0  5.1   < >  4.4   < >  3.8   < >  5.0   CHLORIDE  117*   --    --    --    --   116*   --   114*   --   113*  114*  118*  123*   < >  116*   < >  120*   < >  116*   CO2  25   --    --    --    --   25   --   26   --   24  21  23  22   < >  20   < >  20   < >  18*   ANIONGAP  8   --    --    --    --   5   --   4   --   5  7  6  3   < >  8   < >  11   < >  9   BUN  70*   --    --    --    --   70*   --   67*   --   63*  66*  65*  71*   < >  84*   < >  140*   < >  62*   CR  1.66*   --    --    --    --   1.67*   --   1.48*   --   1.46*  1.55*  1.57*  1.66*   < >  1.74*   < >  2.57*   < >  2.75*   GFRESTIMATED  44*   --    --    --    --   43*   --   50*   --   51*  47*  46*  44*   < >  41*   < >  26*   < >  24*   GLC  81   --    --    --    --   97   --   146*   --   131*  239*  83  128*   < >  187*   < >  137*   < >  139*   A1C   --    --    --    --    --    --    --    --    --    --    --    --    --    --    --    --    --    --   Canceled, Test credited   Below Assay Range  NOTIFIED LEONARD ONEILL AT 0538 ON 7/6/17 BY CHRISSY     JACOB  8.2*   --    --    --    --   8.1*   --   7.7*   --   7.7*  7.8*  7.6*  7.8*   < >  8.0*   < >  8.2*   < >  6.9*   PHOS   --    --    --    --    --    --    --    --    --    --    --    --   3.8   --   3.7   < >  4.4   < >  5.5*   MAG   --    --    --    --    --    --    --    --    --    --    --    --   2.4*   --   2.2   < >  2.3   < >  2.1   LACT   --    --    --    --    --    --    --    --   0.6*   --    --    --    --    --    --    --   0.9   < >  1.5    < > = values in this interval not displayed.       Hepatic Studies    Recent Labs   Lab Test  07/21/17   0615  07/19/17   0621  07/18/17   0624  07/17/17   0630  07/16/17   0326  07/14/17   0349   07/11/17   0328   07/05/17   1810   BILITOTAL  0.5  0.7  0.6  1.0  0.3  0.5   < >  0.5   < >    --    ALKPHOS  297*  201*  171*  144  134  171*   < >  158*   < >   --    ALBUMIN  1.9*  2.0*  1.9*  1.9*  1.8*  2.0*   < >  1.8*   < >   --    AST  75*  60*  88*  101*  72*  68*   < >  34   < >   --    ALT  51  50  61  58  44  36   < >  27   < >   --    LDH   --    --    --    --    --    --    --    --    --   289*   GGT   --    --    --    --    --    --    --   58   --    --     < > = values in this interval not displayed.       Pancreatitis testing    Recent Labs   Lab Test  07/17/17   0630  07/12/17   0342  07/10/17   0340  07/05/17   0346  03/05/17   0358  03/03/17   1458  02/21/17   1520  12/09/16   1159  02/08/16   1144  07/03/12   0710  09/30/10   1734   AMYLASE   --    --    --    --   53  70   --    --    --    --   82   LIPASE   --    --    --    --   216   --   326  161   --    --   138   TRIG  168*  114  177*  174*   --    --    --    --   99  182*   --        Hematology Studies      Recent Labs   Lab Test  07/21/17   0615  07/20/17   0645  07/19/17   0621  07/18/17   1517  07/18/17   0624  07/17/17   0630  07/16/17   0326   WBC  3.9*  4.6  5.6   --   6.3  5.9  5.4   ANEU  2.2  2.7  3.8   --   4.2  4.1  3.4   ALYM  1.2  1.5  1.3   --   1.7  1.5  1.6   ZINA  0.3  0.4  0.3   --   0.2  0.2  0.2   AEOS  0.1  0.1  0.1   --   0.1  0.1  0.1   HGB  7.0*  7.4*  7.8*  6.5*  6.8*  7.7*  7.4*   HCT  22.8*  23.4*  25.0*   --   21.2*  24.4*  24.4*   PLT  118*  127*  145*   --   151  160  141*       Arterial Blood Gas Testing    Recent Labs   Lab Test  07/08/17   0902  07/06/17   2334  07/06/17   0015  07/05/17   2026  07/05/17   1810   PH  7.32*  7.26*  7.27*  7.27*  7.28*   PCO2  35  37  35  35  37   PO2  96  85  109*  155*  188*   HCO3  18*  17*  16*  16*  17*   O2PER  30  30.0  30.0  40  50.0        Urine Studies     Recent Labs   Lab Test  07/19/17   1150  07/11/17   1105  07/06/17   1441  06/06/17   1605  03/24/17   1315   URINEPH  5.0  5.0  5.0  5.0  5.5   NITRITE  Negative  Negative  Negative  Negative   Negative   LEUKEST  Negative  Negative  Trace*  Negative  Negative   WBCU  2  <1  9*  1  1       Vancomycin Levels     Recent Labs   Lab Test  07/06/17   0316  10/28/16   1405   VANCOMYCIN  22.1  14.4       Tacrolimus levels    Invalid input(s): TACROLIMUS, TAC, TACR  Transplant Immunosuppression Labs Latest Ref Rng & Units 7/21/2017 7/20/2017 7/19/2017 7/18/2017 7/17/2017   Tacro Level 5.0 - 15.0 ug/L 4.7(L) - 6.0 - 5.1   Tacro Level - Not Provided - Not Provided - Not Provided   Creat 0.66 - 1.25 mg/dL 1.66(H) 1.67(H) 1.48(H) 1.46(H) 1.55(H)   BUN 7 - 30 mg/dL 70(H) 70(H) 67(H) 63(H) 66(H)   WBC 4.0 - 11.0 10e9/L 3.9(L) 4.6 5.6 6.3 5.9   Neutrophil % 57.5 57.5 68.1 66.5 69.4   ANEU 1.6 - 8.3 10e9/L 2.2 2.7 3.8 4.2 4.1       CSF testing     Recent Labs   Lab Test  06/11/09   0225   CWBC  1   CRBC  2   CGLU  104*   CTP  54         Microbiology:  Ascites  7/1714/2017 negative to date.     7/5/2017 S acpitis    BAL   7/15/2017 yeast and Rhinovirus     7/12/2017 single colony of VRE, C albicans/dubliniensis   Sputum  7/11/2017 VRE and Coag Neg Staph     Bcx   7/15/2017 negative to date.     Last check of C difficile  C Diff Toxin B PCR   Date Value Ref Range Status   10/27/2016  NEG Final    Negative  Negative: Clostridium difficile target DNA sequences NOT detected, presumed   negative for Clostridium difficile toxin B or the number of bacteria present   may be below the limit of detection for the test.   FDA approved assay performed using [employer] GeneXpert real-time PCR.   A negative result does not exclude actual disease due to Clostridium difficile   and may be due to improper collection, handling and storage of the specimen or   the number of organisms in the specimen is below the detection limit of the   assay.         Virology:  CMV viral loads    Recent Labs   Lab Test  07/20/17   1427  07/18/17   0530  07/15/17   1553  07/10/17   0340  07/03/17   1032   CSPEC  Plasma  Plasma  Bronchoalveolar Lavage  EDTA  PLASMA  Plasma, EDTA anticoagulant   CMVLOG  3.3*  3.0*  3.6*  2.2*  <2.1       CMV viral loads    Log IU/mL of CMVQNT   Date Value Ref Range Status   07/20/2017 3.3 (H) <2.1 [Log_IU]/mL Final   07/18/2017 3.0 (H) <2.1 [Log_IU]/mL Final   07/15/2017 3.6 (H) <2.1 [Log_IU]/mL Final   07/10/2017 2.2 (H) <2.1 [Log_IU]/mL Final   07/03/2017 <2.1 <2.1 [Log_IU]/mL Final   06/26/2017 Not Calculated <2.1 [Log_IU]/mL Final       CMV resistance testing  No lab results found.  No results found for: CMVCID, CMVFOS, CMVGAN    USCNS Invalid input(s): USCN, USCNS    USCNS No components found for: USCN, USCNS      No results found for: H6RES    EBV DNA Copies/mL   Date Value Ref Range Status   07/18/2017 085897 (A) EBVNEG [Copies]/mL Final         Pathology   Cytology of ascitic fluid 7/14/2017.   Peritoneal fluid, ascites:   - Negative for malignancy   - Acute and chronic inflammation present   Specimen Adequacy: Satisfactory for evaluation.   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   INTERPRETATION:   Ascites fluid:        Rare to absent viable B cells     COMMENT:   This specimen was collected on 7/14/17 but was not ordered/delivered to   lab/run until 7/18/17 - results should be interpreted with caution due   to low cellularity/viability.     Due to limited cellularity we were only able to analyze the B cells.   There is no immunophenotypic evidence of B cell non-Hodgkin lymphoma.   Neoplastic cells, including large cells, may not survive specimen   processing. This sample may not be representative. Final interpretation   requires correlation with morphologic and clinical features.       Imaging:  MRI brain 7/20/2017.   Impression:  1. Significant image degradation due to motion artifact, with no  definite acute intracranial pathology. No abnormal contrast enhancing  intracranial lesions.  2. Mucosal thickening in the sphenoid and maxillary sinuses and left  greater than right mastoid fluid are nonspecific in the setting  of  recent intubation.    CT c/a/p 7/13/2017 Reviewed by myself.   IMPRESSION:   1. Improved moderate right-sided pleural effusion and resolution of  left-sided pleural effusion. There remain large areas of  atelectasis/consolidation in both lungs, including in the left lower  lobe despite resolution of left-sided pleural effusion. Superimposed  infectious process cannot be excluded.  2. Moderate-large amount of ascites increased from 7/8/2017, which  makes it difficult to evaluate for the presence or absence of  inflammatory change or infectious process in the abdomen or pelvis. No  intra-abdominal abscess.  3. Stable small presumed hematoma within the ventral abdominal wall  fat.  4. Splenomegaly.        Naseem Figueroa  Pager: [phone]

## 2017-07-21 NOTE — PROGRESS NOTES
Nephrology Progress Note  07/21/2017     Camacho Bhagat is a 51 year old male s/p liver transplant on 3/17, admitted on 7/4/17 with neutropenic colitis, found to be non ischemic per Ex Lap with need to undergo colonoscopy to r/o CMV colitis. Unfortunately patient has developed hyperkalemia over the last several days necessitating correction prior to procedure. Correction of hyperkalemia is complicated by inability to give kayexalate in setting of colitis. In addition, patient has CKD with baseline creat 1.4-1.7 range giving him a GFR of ~ 40 ml/mn.      Review of the medical record shows K was in the 3-4 range until 7/15/17 then began rising into the 5's. On 7/19 K oriana to 5.9 following administration of a unit of RBCs on 7/18, discontinuation of Florinef on 7/17 and ongoing feedings with Peptimen. TF formula was changed to Nepro on 7/19, but patient has been NPO for procedure. Patient received Insulin/D50 early this morning and Lasix 80 mg IV at 0930 today with decline in K from 6.0  to 5.4. Creat oriana slightly to 1.6 from 1.4. UO is over 3 liters. -140/. He continues to have large stool output      ASSESSMENT AND RECOMMENDATIONS:      1. Hyperkalemia - Resolved. K 4.9 today. Etiology likely combination of stopping Florinef, TF formulation and RBC in patient with CKD.    - Continue Florinef   - Continue renal TF formulation ( Nepro)     2. CKD3 - Etiology likely recurrent EJ, CNI, DM. Urine protein 1.5 g/gCr 6/17. TAC level 6.0. -140/   - Goals for treatment include A1c of 7%, /80, , ACE when renal function stabilizes.      3. HTN - Well controlled on Amlodipine 5 mg qd, Clonidine 0.2 mg bid.      4. Hypernatremia, chronic - Na 150 in setting of being NPO off and on for the last 2 dys. Had been receiving 60 ml free water per FT q hour. Given NPO status for colonoscopy today have recommended starting D5W   - Begin D5W at 100 cc/hr while NPO. Check Na and BS q 4 hrs.      5. Volume  status - He is likely hypo to euvolemic given large UO and stool output. UO 2225 cc last 24 hrs. Creat is stable at 1.6.     - Anticipate that volume status will improve once he is permitted to resume his diet/free water supplements.     6. Disposition - Given resolution of hyperkalemia, Nephrology will sign off. Please call with questions.      Recommendations were communicated to primary team directly     Seen and discussed with Dr. Amrita Cazares, NP   [phone]      Interval History :     K has normalized  Na has risen given NPO status  Creat is stable    Review of Systems:   Unreliable given AMS    Physical Exam:   I/O last 3 completed shifts:  In: 1150.67 [I.V.:200.67; NG/GT:30]  Out: 2550 [Urine:2550]   /81 (BP Location: Right arm)  Pulse 94  Temp 99.6  F (37.6  C) (Oral)  Resp 20  Ht 1.829 m (6')  Wt 107 kg (235 lb 12.8 oz)  SpO2 95%  BMI 31.98 kg/m2     GENERAL APPEARANCE: lethargic but rousable  EYES: no scleral icterus, pupils equal  HENT: FT present  Pulmonary: lungs clear to auscultation anterior exam. On supplemental oxygen. Breathing is non labored.   CV: tachy    - Edema- none  GI: soft, non distended. Incision line intact   : Crowe present  NEURO: lethargic    Labs:   All labs reviewed by me  Electrolytes/Renal -   Recent Labs   Lab Test  07/21/17   0615  07/20/17   2349  07/20/17 2018 07/20/17   0645   07/19/17   0621   07/16/17   0326   07/14/17   0349  07/13/17   2343   NA  150*   --    --    --   146*   --   144   < >  148*   < >  144   --    POTASSIUM  4.9  5.0  5.0   < >  5.5*   < >  5.9*   < >  5.1   < >  4.4  4.7   CHLORIDE  117*   --    --    --   116*   --   114*   < >  123*   < >  116*   --    CO2  25   --    --    --   25   --   26   < >  22   < >  20   --    BUN  70*   --    --    --   70*   --   67*   < >  71*   < >  84*   --    CR  1.66*   --    --    --   1.67*   --   1.48*   < >  1.66*   < >  1.74*   --    GLC  81   --    --    --   97   --   146*   <  >  128*   < >  187*   --    JACOB  8.2*   --    --    --   8.1*   --   7.7*   < >  7.8*   < >  8.0*   --    MAG   --    --    --    --    --    --    --    --   2.4*   --   2.2  2.1   PHOS   --    --    --    --    --    --    --    --   3.8   --   3.7  4.1    < > = values in this interval not displayed.       CBC -   Recent Labs   Lab Test  07/21/17 0615 07/20/17   0645  07/19/17 0621   WBC  3.9*  4.6  5.6   HGB  7.0*  7.4*  7.8*   PLT  118*  127*  145*       LFTs -   Recent Labs   Lab Test  07/21/17 0615 07/19/17 0621 07/18/17   0624   ALKPHOS  297*  201*  171*   BILITOTAL  0.5  0.7  0.6   ALT  51  50  61   AST  75*  60*  88*   PROTTOTAL  5.7*  5.9*  5.4*   ALBUMIN  1.9*  2.0*  1.9*       Iron Panel -   Recent Labs   Lab Test  02/08/16   1144   IRON  93   IRONSAT  41       Current Medications:    polyethylene glycol  2,000 mL Oral Once     fludrocortisone  100 mcg Oral BID     ganciclovir (CYTOVENE) intermittent infusion  2.5 mg/kg (Dosing Weight) Intravenous Q12H     insulin glargine  18 Units Subcutaneous Q24H     insulin isophane human  28 Units Subcutaneous QPM     insulin aspart  1-12 Units Subcutaneous Q4H     aspirin  80 mg Oral Daily     lidocaine  1-2 patch Transdermal Q24h    And     lidocaine   Transdermal Q24H    And     lidocaine   Transdermal Q8H     cyclobenzaprine  10 mg Oral At Bedtime     insulin aspart   Subcutaneous TID w/meals     tacrolimus  7 mg Oral BID IS     cloNIDine  0.2 mg Oral BID     amLODIPine  5 mg Oral Daily     multivitamins with minerals  15 mL Per Feeding Tube Daily     heparin  5,000 Units Subcutaneous Q8H     lidocaine (viscous)  5 mL Topical Once     pantoprazole  40 mg Oral or Feeding Tube Daily     sodium chloride (PF)  3 mL Intracatheter Q8H       D5W       NaCl 10 mL/hr at 07/21/17 0026     IV fluid REPLACEMENT ONLY 25 mL/hr at 07/20/17 0430     Cinda Cazares, JUANITO

## 2017-07-21 NOTE — PROGRESS NOTES
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  He has experienced increased hyperglycemia related to acute stress and enteral feedings.    Glucoses dropped to 80s overnight after TFs went off, rising to 90s-low 100s once NPH worse off.  D5W now running for hypernatremia- but may discontinue later today depending on sodium level.    -monitor closely for hypoglycemia if regular insulin bolused for hyperkalemia  -glargine increased slightly from 18 to 20 units q24h at 1600.  -NPH 28 units for Nepro 115 cc/h x 14 h, Order reads HOLD NPH if no TFs  -aspart 1 unit per 10 grams carbohydrate if taking PO  -aspart high correction q4h     Outpatient diabetes follow up: Dr. Eckert at [facility]             Interval History:   The last 24 hours progress and nursing notes reviewed.  TF regimen is: Nepro 115 cc/h x 14 hours, -- last night TFs ran for 6 hours rather than 14h, off at MN in prep for colonscopy this afternoon.  Glucoses dropped to the 80s around 0400- when TFs were off but NPH just wearing off.  Today BG up to 100, then D5W was started at 100ml/h for hypernatremia.  This will stop once sodium improves, likely once po intake allowed.  Plan is for usual TF cycle to run tonight.  Ongoing altered mental status.        Recent Labs  Lab 07/21/17  1346 07/21/17  1142 07/21/17  0733 07/21/17  0615 07/21/17  0605 07/21/17  0400 07/21/17  0008  07/20/17  0645  07/19/17  0621  07/18/17  0624  07/17/17  0630  07/16/17  2142   GLC  --   --   --  81  --   --   --   --  97  --  146*  --  131*  --  239*  --  83   * 100* 93  --  89 89 161*  < >  --   < >  --   < >  --   < >   --   < >  --    < > = values in this interval not displayed.            Review of Systems:   See interval hx          Medications:   PTA taking Januvia and glargine versus glargine and aspart per carb    Active Diet Order      NPO for Medical/Clinical Reasons Except for: Meds     Physical Exam:  Gen:  resting in bed, in NAD   HEENT: NC/AT, mucous membranes are moist, NJ feeding tube  Resp: Unlabored  Neuro: alert, confused.  /83 (BP Location: Left arm)  Pulse 87  Temp 100.6  F (38.1  C) (Oral)  Resp 20  Ht 1.829 m (6')  Wt 107 kg (235 lb 12.8 oz)  SpO2 94%  BMI 31.98 kg/m2           Data:     Lab Results   Component Value Date    A1C  07/06/2017     Canceled, Test credited   Below Assay Range  NOTIFIED LEONARD ONEILL AT 0538 ON 7/6/17 BY CHRISSY      A1C 9.4 02/16/2017    A1C 6.2 12/14/2016    A1C 6.1 10/27/2016    A1C 8.3 07/02/2012            Recent Labs   Lab Test  07/21/17   0615  07/20/17   2349   07/20/17   0645   NA  150*   --    --   146*   POTASSIUM  4.9  5.0   < >  5.5*   CHLORIDE  117*   --    --   116*   CO2  25   --    --   25   ANIONGAP  8   --    --   5   GLC  81   --    --   97   BUN  70*   --    --   70*   CR  1.66*   --    --   1.67*   JACOB  8.2*   --    --   8.1*    < > = values in this interval not displayed.     CBC RESULTS:   Recent Labs   Lab Test  07/21/17   0615   WBC  3.9*   RBC  2.55*   HGB  7.0*   HCT  22.8*   MCV  89   MCH  27.5   MCHC  30.7*   RDW  15.0   PLT  118*     Givoana Villasenor PA-C [phone]  Diabetes Management job code [id number]

## 2017-07-21 NOTE — PLAN OF CARE
Problem: Goal Outcome Summary  Goal: Goal Outcome Summary  Outcome: No Change  Pt disoriented to place, time, and situation; speech illogical.  Tmax 100.6; BP's 150-160's/80's, HR 80-90's, RR 20, sats 94% on 2L NC.  BG ; novolog administered per ss.  Sodium 150, D5 infusing at 100mL/hr into PIV, recheck 149.  Sepsis protocol triggered; Lactic 0.7.  Echo, BLE and Abdominal U/S, and colonoscopy completed.  No c/o pain.  NPO today for colonoscopy; plan to cycle TF at 20:00.  Crowe with good output.  Loose BM's with GoLytely bowel prep.  Incision stapled and open to air.  Pt turned 2qh.  Microvascular injuries on fingers on toes; vascular consulted; pulses palpable in all extremities.  Attendant at bedside.  Daughter Maribell updated.  Continue to monitor and notify MD with changes.

## 2017-07-21 NOTE — PLAN OF CARE
Problem: Goal Outcome Summary  Goal: Goal Outcome Summary  OT 7A: Pt with impaired insight to cognitive deficits as reporting feeling normal cognitively yet not oriented to time or month and ~%50 successful with 1-step commands. Attempted supine <> sitting EOB to promote OOB activity; however patient unable to follow commands appropriately and resistant to therapist assist with movements upon reaching sidelying. Supine <> sidelying mod-max A with VCs. Pt able to complete UB bathing with cues for initiation; dependent for LB bathing while in bed. Pt limited by cognition, activity tolerance, and pain. Rec: TCU to progress independence.

## 2017-07-21 NOTE — PROGRESS NOTES
EMR reviewed  Feet are warm with pounding pulses  Right great toe is black    Impression  Right toe ischemia likely from microembolic event or from pressors  Toe should heal   Will get echo to rule out embolic source    Seen with dr. wanda Daley

## 2017-07-21 NOTE — PLAN OF CARE
Problem: Goal Outcome Summary  Goal: Goal Outcome Summary  PT-7A: Cancel. Pt adamantly declining therapy this afternoon secondary to family circumstance.  PT re-scheduled per POC.

## 2017-07-21 NOTE — PLAN OF CARE
Problem: Goal Outcome Summary  Goal: Goal Outcome Summary  SLP: Dysphagia therapy cancelled.  Pt remains medically NPO in anticipation of procedure this date.  ST to follow as indicated on POC.

## 2017-07-21 NOTE — PLAN OF CARE
Problem: Goal Outcome Summary  Goal: Goal Outcome Summary  Outcome: No Change  VSS on RA. Blood sugars 161 and 89. C/o pain with movement. Denies nausea. NPO at this time. NJ tube clamped.Tube feeds stopped at 0000. PIV to RFA infusing NS at 10mL/hr. PIV to LFA saline locked. Two loose BM's noted this shift. Crowe patent draining clear, yellow urine. Has had 775mL out so far this shift. Abdominal incision well approximated with staples. No drainage noted. Uses lift to get into the chair otherwise is bedrest. Confusion continues. Disoriented to place and situation. Bedside attendant in room.

## 2017-07-21 NOTE — PROGRESS NOTES
Immunosuppression Note:    Camacho Bhagat is a 51 year old male who is seen today  for immunosuppression management     I, Jovan Tran MD, I have examined the patient with the resident/PA/Fellow, discussed and agree with the note and findings.  I have reviewed today's vital signs, medications, labs and imaging. I reviewed the immunosuppression medications and levels. 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     Graft Function: Good function. US liver normal doppler. Slight increase in Alk phos and AST, improved yesterday, no labs today. Tac dose increased at that time.   Immunosuppression Management:   CellCept 250 mg BID: Held since 6/27/17 due to neutropenia. Continue to hold.   Tac goal level 5-8. Since MMF held will aim for a goal closer to ~8. Prograf 7 mg BID, increased 7/18. 7/21 Level 4.7, 12 hr trough. Will repeat level tomorrow.   Cardiorespiratory: Intubated for ex lap, initially requiring pressor support. HDS. Moderate right sided pleural effusion. Chest tube placed 7/9 that was removed 7/12. HDS.   -Difficult to have patient do aggressive pulmonary toilet. Albuterol/Mucomyst neb x 4 doses and started acapella. Patient to be OOB to chair as much as possible.   -CXR- pulmonary edema. RR increased. Lasix IV frequently  Hematology: Pancytopenia, acute on chronic, secondary to medications. MMF and bactrim held (since 6/27). Valcyte held on admission. Neupogen 480 mcg given on 7/4 to 7/7,  CMV seronegative  and donor seropositive.   -7/3 CMV PCR < 137. 7/10 CMV  7/18 CMV  copies, 7/20 1906 copies. Valcyte ppx dose restarted and discontinued. Started IV Ganciclovir 7/20 due to concern for CMV colitis.  Awaiting colonoscopy to evaluate for CMV colitis. Continue to hold MMF and bactrim.   -Anemia- HGB 7.0 today, stable. DERRELL negative, checked given spherocytes on P smear.    -Cytology ascites fluid, negative for malignancy   GI: Neutropenic colitis.   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 ID recommending colonoscopy, planning to complete this afternoon  -NJ placed. TF Peptamen titrated at goal, cycled  -C diff negative.  -Start nectar thick full liquids. Aspiration precautions. SLP following.    Fluid/Electrolytes/Renal: EJ, secondary to hypoperfusion, sepsis. Cr 1.7<-1.5 (1.7). BUN 70. Good UO, 2.6 L yesterday. HypoNa, resolved. Hyperkalemia, PTA on florinef- resumed yesterday. K stable. Due to frequent lasix over the last 2 days for hyperkalemia Na is elevated at 150 this AM. Will start D5 @ 100/hour per nephrology and monitor Na. Avoid nephrotoxins.   Endocrine: DM II, on insulin gtt. Endocrine consulted to transition to subQ insulin.  Infectious disease: Started on Zosyn, Vancomycin, Flagyl, Micafungin on admission (7/4/2017). 7/5 Blood cultures, no growth thus far. Abdominal fluid culture collected intraoperatively, gram stain with no organisms, fungal and bacterial culture, GPCs. Bronchoalveolar lavage growing VRE, colonization. Tx ID consulted.  -Fever > 101 for several days, WBC 3.9, stable. 7/13 CT scan negative for abscess, large amount of ascites, mildly prominent lymph nodes-no significant change. 7/15 BAL cx and 7/14 ascites cx unremarkable per ID. Ascitic fluid exudate. Continue ertapenem x 14 days. EBV ,000 copies. Brain MRI negative, less suspicious for PTLD. Immunosuppression remains lowered. CMV viremia as noted above.    -T max 24 hrs- 99.9  -CMV viremia - CMV D+R-, low viral count. IS reduced  (MMF held). Started IV Ganciclovir yesterday as noted above.   -Possible drug fever. Seroquel stopped due to this  Neuro: Delirium, hold, limit medications that have CNS SE.  Neurology consulted- dx likely toxic metabolic encephalopathy, improving slowly. Video EEG showed mild to moderate encephalopathy. Assist patient with sleep hygiene.   Pain: General discomfort, chronic back pain. Patient very restless. Lidoderm patch for back pain.  Oxycodone 5 mg PRN. Restart PTA flexeril qHS   Vascular: Right Arterial line clot 2/2 previous   art line. Vascular consulted. Recommend ASA only. Some evidence of microvascular injury in digits (toes) this is most likely due to injury while patient was on pressor therapy and sepsis. Now with a third toe injury, vascular consulted again. US completed. Plan for ECHO today.   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   Activity: Daily PT/OT  Prophylaxis: PPI, DVT-SCD  Disposition: 7A.    Medical Decision Making: Medium  Admit 10706 (moderate level decision making)    PILLO/Fellow/Resident Provider: Luiza Wing PA-C [id number]    Faculty: Jovan Tran M.D.  __________________________________________________________________  Transplant History:.  3/4/2017 DD OLT with Dr. Tran (Liver), Postoperative day: 139     Interval History:   Overnight events: No acute events o/n. Patient oriented x1.     ROS:   A 10-point review of systems was negative except as noted above.    Meds:    fludrocortisone  100 mcg Oral BID     ganciclovir (CYTOVENE) intermittent infusion  2.5 mg/kg (Dosing Weight) Intravenous Q12H     insulin glargine  18 Units Subcutaneous Q24H     insulin isophane human  28 Units Subcutaneous QPM     insulin aspart  1-12 Units Subcutaneous Q4H     aspirin  80 mg Oral Daily     lidocaine  1-2 patch Transdermal Q24h    And     lidocaine   Transdermal Q24H    And     lidocaine   Transdermal Q8H     cyclobenzaprine  10 mg Oral At Bedtime     insulin  aspart   Subcutaneous TID w/meals     tacrolimus  7 mg Oral BID IS     cloNIDine  0.2 mg Oral BID     amLODIPine  5 mg Oral Daily     multivitamins with minerals  15 mL Per Feeding Tube Daily     heparin  5,000 Units Subcutaneous Q8H     lidocaine (viscous)  5 mL Topical Once     pantoprazole  40 mg Oral or Feeding Tube Daily     sodium chloride (PF)  3 mL Intracatheter Q8H       Physical Exam:     Admit Weight: 94.2 kg (207 lb 11.2 oz) (SCALE 2)    Current vitals:   /83 (BP Location: Left arm)  Pulse 87  Temp 99.2  F (37.3  C) (Oral)  Resp 20  Ht 1.829 m (6')  Wt 107 kg (235 lb 12.8 oz)  SpO2 94%  BMI 31.98 kg/m2    Vital sign ranges:    Temp:  [97.7  F (36.5  C)-99.9  F (37.7  C)] 99.2  F (37.3  C)  Pulse:  [] 87  Heart Rate:  [86-91] 91  Resp:  [20] 20  BP: (134-160)/(72-89) 160/83  SpO2:  [93 %-95 %] 94 %  Patient Vitals for the past 24 hrs:   BP Temp Temp src Pulse Heart Rate Resp SpO2   07/21/17 1145 160/83 99.2  F (37.3  C) Oral 87 - 20 94 %   07/21/17 0700 154/81 99.6  F (37.6  C) Oral 94 - 20 95 %   07/21/17 0550 - 99.9  F (37.7  C) Oral - - - -   07/21/17 0341 145/89 99.9  F (37.7  C) Oral 96 - 20 94 %   07/21/17 0000 157/79 97.7  F (36.5  C) Axillary 108 - 20 93 %   07/20/17 1958 138/72 98.5  F (36.9  C) Axillary - 91 20 93 %   07/20/17 1555 134/76 98.7  F (37.1  C) Oral - 86 20 93 %     General Appearance: NAD  Skin: normal, warm, dry  Heart: RRR  Lungs: Non labored on 2-3L NC.  Abdomen: The abdomen is soft, midline incision is c/d/i and covered. Chevron incision well healed.   : vazquez is present, yellow urine in bag.   Extremities: BLE trace edema.  Neurologic: alert, orientated x 1. No tremor.    Data:   CMP    Recent Labs  Lab 07/21/17  0615 07/20/17  2349  07/20/17  0645  07/19/17  0621  07/16/17  0326  07/14/17  1517   *  --   --  146*  --  144  < > 148*  < >  --    POTASSIUM 4.9 5.0  < > 5.5*  < > 5.9*  < > 5.1  < >  --    CHLORIDE 117*  --   --  116*  --  114*  < > 123*   < >  --    CO2 25  --   --  25  --  26  < > 22  < >  --    GLC 81  --   --  97  --  146*  < > 128*  < >  --    BUN 70*  --   --  70*  --  67*  < > 71*  < >  --    CR 1.66*  --   --  1.67*  --  1.48*  < > 1.66*  < >  --    GFRESTIMATED 44*  --   --  43*  --  50*  < > 44*  < >  --    GFRESTBLACK 53*  --   --  52*  --  60*  < > 53*  < >  --    JACOB 8.2*  --   --  8.1*  --  7.7*  < > 7.8*  < >  --    MAG  --   --   --   --   --   --   --  2.4*  --   --    PHOS  --   --   --   --   --   --   --  3.8  --   --    ALBUMIN 1.9*  --   --   --   --  2.0*  < > 1.8*  --   --    BILITOTAL 0.5  --   --   --   --  0.7  < > 0.3  --   --    ALKPHOS 297*  --   --   --   --  201*  < > 134  --   --    AST 75*  --   --   --   --  60*  < > 72*  --   --    ALT 51  --   --   --   --  50  < > 44  --   --    FAMY  --   --   --   --   --   --   --   --   --  6   < > = values in this interval not displayed.  CBC    Recent Labs  Lab 07/21/17  0615 07/20/17  0645   HGB 7.0* 7.4*   WBC 3.9* 4.6   * 127*     COAGS    Recent Labs  Lab 07/21/17  1230 07/17/17  0630   INR 1.24* 1.19*      Urinalysis  Recent Labs   Lab Test  07/19/17   1150  07/11/17   1105   06/14/17   1508   04/11/16   1345   COLOR  Yellow  Yellow   < >   --    < >  Yellow   APPEARANCE  Slightly Cloudy  Clear   < >   --    < >  Clear   URINEGLC  Negative  Negative   < >   --    < >  30*   URINEBILI  Negative  Negative   < >   --    < >  Negative   URINEKETONE  Negative  Negative   < >   --    < >  Negative   SG  1.009  1.009   < >   --    < >  1.016   UBLD  Moderate*  Negative   < >   --    < >  Small*   URINEPH  5.0  5.0   < >   --    < >  5.0   PROTEIN  10*  10*   < >   --    < >  30*   NITRITE  Negative  Negative   < >   --    < >  Negative   LEUKEST  Negative  Negative   < >   --    < >  Negative   RBCU  6*  <1   < >   --    < >  1   WBCU  2  <1   < >   --    < >  1   UTPG   --    --    --   1.55*   --   0.41*    < > = values in this interval not displayed.       Cultures:    Blood Cultures x2 7/4/2017 NGTD     Abdominal Fluid Culture 7/4/2017: NGTD    Abdominal Fluid Culture 7/5/17: NGTD    Bronchoalveolar Culture 7/5/17: VRE.     Sputum endotracheal gm stain/culture 7/11/17: >25 PMNs/low power field   Few Mixed gram positive elvie    CT scan:    7/4/2017 CONCLUSION:   1. Right colonic wall thickening suggesting colitis. Follow-up is necessary to confirm resolution in order to exclude colonic mass.  2. Transverse colon is not well distended reducing evaluation for wall thickening.  3. Small amount of ascites.  4. Splenomegaly.  5. Prominent portal and splenic veins. If confirmation of vascular patency is indicated consider follow-up ultrasound of the liver with Doppler.  6. Small right pleural effusion.  7. Stranding in the subcutaneous fat of the ventral pelvis.     Abdominal XR 7/4/2017:   1. No evidence of pneumoperitoneum.  2. Dilated loop of bowel in the central abdomen, likely sigmoid.    XR Chest Portable 1 View 7/4/17:  1. Endotracheal tube tip projects 5.3 cm from the chacorta.  2. Increased bilateral pleural effusions, right greater than left.  3. Unchanged bibasilar patchy opacities.

## 2017-07-21 NOTE — PLAN OF CARE
Problem: Goal Outcome Summary  Goal: Goal Outcome Summary  Outcome: No Change  VSS and afebrile throughout shift. Sating 95 on 2L of oxygen. Patient received oxycodone x1 for pain. He denies nausea. He has been NPO all shift. Crowe is patent and draining with good output. Patient has had multiple loose stools this evening. Blood sugars were 114 and 166. Patient's potassium rechecks this evening was 5.0. Tube feeds were started at 1645 this evening and are running at 115cc/hr via his NJ tube. Tube feeds are to be turned off at midnight. Plan is for the patient to undergo a colonoscopy tomorrow. Will continue to monitor and update the team as needed.

## 2017-07-22 ENCOUNTER — APPOINTMENT (OUTPATIENT)
Dept: SPEECH THERAPY | Facility: CLINIC | Age: 52
End: 2017-07-22
Attending: TRANSPLANT SURGERY
Payer: COMMERCIAL

## 2017-07-22 LAB
ANION GAP SERPL CALCULATED.3IONS-SCNC: 4 MMOL/L (ref 3–14)
ANION GAP SERPL CALCULATED.3IONS-SCNC: 7 MMOL/L (ref 3–14)
BASOPHILS # BLD AUTO: 0 10E9/L (ref 0–0.2)
BASOPHILS NFR BLD AUTO: 0.4 %
BUN SERPL-MCNC: 58 MG/DL (ref 7–30)
BUN SERPL-MCNC: 59 MG/DL (ref 7–30)
CALCIUM SERPL-MCNC: 7.8 MG/DL (ref 8.5–10.1)
CALCIUM SERPL-MCNC: 8.1 MG/DL (ref 8.5–10.1)
CHLORIDE SERPL-SCNC: 116 MMOL/L (ref 94–109)
CHLORIDE SERPL-SCNC: 118 MMOL/L (ref 94–109)
CMV DNA SPEC QL NAA+PROBE: ABNORMAL
CO2 SERPL-SCNC: 23 MMOL/L (ref 20–32)
CO2 SERPL-SCNC: 26 MMOL/L (ref 20–32)
CREAT SERPL-MCNC: 1.63 MG/DL (ref 0.66–1.25)
CREAT SERPL-MCNC: 1.64 MG/DL (ref 0.66–1.25)
DIFFERENTIAL METHOD BLD: ABNORMAL
EOSINOPHIL # BLD AUTO: 0.1 10E9/L (ref 0–0.7)
EOSINOPHIL NFR BLD AUTO: 1.3 %
ERYTHROCYTE [DISTWIDTH] IN BLOOD BY AUTOMATED COUNT: 15.5 % (ref 10–15)
GFR SERPL CREATININE-BSD FRML MDRD: 44 ML/MIN/1.7M2
GFR SERPL CREATININE-BSD FRML MDRD: 45 ML/MIN/1.7M2
GLUCOSE BLDC GLUCOMTR-MCNC: 139 MG/DL (ref 70–99)
GLUCOSE BLDC GLUCOMTR-MCNC: 143 MG/DL (ref 70–99)
GLUCOSE BLDC GLUCOMTR-MCNC: 152 MG/DL (ref 70–99)
GLUCOSE BLDC GLUCOMTR-MCNC: 169 MG/DL (ref 70–99)
GLUCOSE BLDC GLUCOMTR-MCNC: 178 MG/DL (ref 70–99)
GLUCOSE BLDC GLUCOMTR-MCNC: 202 MG/DL (ref 70–99)
GLUCOSE SERPL-MCNC: 184 MG/DL (ref 70–99)
GLUCOSE SERPL-MCNC: 186 MG/DL (ref 70–99)
HCT VFR BLD AUTO: 24.1 % (ref 40–53)
HGB BLD-MCNC: 7.4 G/DL (ref 13.3–17.7)
IMM GRANULOCYTES # BLD: 0 10E9/L (ref 0–0.4)
IMM GRANULOCYTES NFR BLD: 0.2 %
LYMPHOCYTES # BLD AUTO: 1.8 10E9/L (ref 0.8–5.3)
LYMPHOCYTES NFR BLD AUTO: 39.5 %
MCH RBC QN AUTO: 27.8 PG (ref 26.5–33)
MCHC RBC AUTO-ENTMCNC: 30.7 G/DL (ref 31.5–36.5)
MCV RBC AUTO: 91 FL (ref 78–100)
MONOCYTES # BLD AUTO: 0.4 10E9/L (ref 0–1.3)
MONOCYTES NFR BLD AUTO: 8.3 %
NEUTROPHILS # BLD AUTO: 2.3 10E9/L (ref 1.6–8.3)
NEUTROPHILS NFR BLD AUTO: 50.3 %
NRBC # BLD AUTO: 0 10*3/UL
NRBC BLD AUTO-RTO: 0 /100
PLATELET # BLD AUTO: 136 10E9/L (ref 150–450)
POTASSIUM SERPL-SCNC: 3.8 MMOL/L (ref 3.4–5.3)
POTASSIUM SERPL-SCNC: 4.1 MMOL/L (ref 3.4–5.3)
RBC # BLD AUTO: 2.66 10E12/L (ref 4.4–5.9)
SODIUM SERPL-SCNC: 147 MMOL/L (ref 133–144)
SODIUM SERPL-SCNC: 149 MMOL/L (ref 133–144)
SPECIMEN SOURCE: ABNORMAL
TACROLIMUS BLD-MCNC: 3.8 UG/L (ref 5–15)
TME LAST DOSE: ABNORMAL H
WBC # BLD AUTO: 4.5 10E9/L (ref 4–11)

## 2017-07-22 PROCEDURE — 80048 BASIC METABOLIC PNL TOTAL CA: CPT

## 2017-07-22 PROCEDURE — 25000131 ZZH RX MED GY IP 250 OP 636 PS 637: Performed by: PHYSICIAN ASSISTANT

## 2017-07-22 PROCEDURE — 25000132 ZZH RX MED GY IP 250 OP 250 PS 637: Performed by: NURSE PRACTITIONER

## 2017-07-22 PROCEDURE — 27210432 ZZH NUTRITION PRODUCT RENAL BASIC LITER

## 2017-07-22 PROCEDURE — 12000025 ZZH R&B TRANSPLANT INTERMEDIATE

## 2017-07-22 PROCEDURE — 25000132 ZZH RX MED GY IP 250 OP 250 PS 637: Performed by: PHYSICIAN ASSISTANT

## 2017-07-22 PROCEDURE — 36415 COLL VENOUS BLD VENIPUNCTURE: CPT | Performed by: PHYSICIAN ASSISTANT

## 2017-07-22 PROCEDURE — 80197 ASSAY OF TACROLIMUS: CPT | Performed by: PHYSICIAN ASSISTANT

## 2017-07-22 PROCEDURE — 25000132 ZZH RX MED GY IP 250 OP 250 PS 637: Performed by: TRANSPLANT SURGERY

## 2017-07-22 PROCEDURE — 25000132 ZZH RX MED GY IP 250 OP 250 PS 637: Performed by: STUDENT IN AN ORGANIZED HEALTH CARE EDUCATION/TRAINING PROGRAM

## 2017-07-22 PROCEDURE — 40000225 ZZH STATISTIC SLP WARD VISIT

## 2017-07-22 PROCEDURE — 36415 COLL VENOUS BLD VENIPUNCTURE: CPT

## 2017-07-22 PROCEDURE — 80048 BASIC METABOLIC PNL TOTAL CA: CPT | Performed by: PHYSICIAN ASSISTANT

## 2017-07-22 PROCEDURE — 25000128 H RX IP 250 OP 636: Performed by: PHYSICIAN ASSISTANT

## 2017-07-22 PROCEDURE — 25000132 ZZH RX MED GY IP 250 OP 250 PS 637: Performed by: SURGERY

## 2017-07-22 PROCEDURE — 25000128 H RX IP 250 OP 636: Performed by: STUDENT IN AN ORGANIZED HEALTH CARE EDUCATION/TRAINING PROGRAM

## 2017-07-22 PROCEDURE — 25000125 ZZHC RX 250: Performed by: PHYSICIAN ASSISTANT

## 2017-07-22 PROCEDURE — 92526 ORAL FUNCTION THERAPY: CPT | Mod: GN

## 2017-07-22 PROCEDURE — 85025 COMPLETE CBC W/AUTO DIFF WBC: CPT | Performed by: PHYSICIAN ASSISTANT

## 2017-07-22 PROCEDURE — 00000146 ZZHCL STATISTIC GLUCOSE BY METER IP

## 2017-07-22 RX ORDER — SODIUM CHLORIDE 9 MG/ML
INJECTION, SOLUTION INTRAVENOUS
Status: DISCONTINUED
Start: 2017-07-22 | End: 2017-07-25 | Stop reason: HOSPADM

## 2017-07-22 RX ADMIN — HEPARIN SODIUM 5000 UNITS: 5000 INJECTION, SOLUTION INTRAVENOUS; SUBCUTANEOUS at 21:02

## 2017-07-22 RX ADMIN — PANTOPRAZOLE SODIUM 40 MG: 40 TABLET, DELAYED RELEASE ORAL at 08:13

## 2017-07-22 RX ADMIN — FLUDROCORTISONE ACETATE 100 MCG: 0.1 TABLET ORAL at 08:12

## 2017-07-22 RX ADMIN — TACROLIMUS 7 MG: 5 CAPSULE ORAL at 08:13

## 2017-07-22 RX ADMIN — Medication 0.2 MG: at 08:13

## 2017-07-22 RX ADMIN — TACROLIMUS 7 MG: 5 CAPSULE ORAL at 17:39

## 2017-07-22 RX ADMIN — OXYCODONE HYDROCHLORIDE 5 MG: 5 SOLUTION ORAL at 15:00

## 2017-07-22 RX ADMIN — GANCICLOVIR SODIUM 250 MG: 500 INJECTION, POWDER, LYOPHILIZED, FOR SOLUTION INTRAVENOUS at 05:39

## 2017-07-22 RX ADMIN — MULTIVIT AND MINERALS-FERROUS GLUCONATE 9 MG IRON/15 ML ORAL LIQUID 15 ML: at 08:13

## 2017-07-22 RX ADMIN — AMLODIPINE BESYLATE 5 MG: 10 TABLET ORAL at 08:13

## 2017-07-22 RX ADMIN — FLUDROCORTISONE ACETATE 100 MCG: 0.1 TABLET ORAL at 21:02

## 2017-07-22 RX ADMIN — CYCLOBENZAPRINE HYDROCHLORIDE 10 MG: 10 TABLET, FILM COATED ORAL at 21:02

## 2017-07-22 RX ADMIN — Medication 80 MG: at 08:13

## 2017-07-22 RX ADMIN — INSULIN HUMAN 28 UNITS: 100 INJECTION, SUSPENSION SUBCUTANEOUS at 21:02

## 2017-07-22 RX ADMIN — LIDOCAINE 2 PATCH: 50 PATCH CUTANEOUS at 21:16

## 2017-07-22 RX ADMIN — HEPARIN SODIUM 5000 UNITS: 5000 INJECTION, SOLUTION INTRAVENOUS; SUBCUTANEOUS at 11:48

## 2017-07-22 RX ADMIN — GANCICLOVIR SODIUM 250 MG: 500 INJECTION, POWDER, LYOPHILIZED, FOR SOLUTION INTRAVENOUS at 17:39

## 2017-07-22 RX ADMIN — DEXTROSE MONOHYDRATE: 50 INJECTION, SOLUTION INTRAVENOUS at 08:29

## 2017-07-22 RX ADMIN — OXYCODONE HYDROCHLORIDE 10 MG: 5 SOLUTION ORAL at 21:02

## 2017-07-22 RX ADMIN — POTASSIUM CHLORIDE 20 MEQ: 1.5 POWDER, FOR SOLUTION ORAL at 10:18

## 2017-07-22 RX ADMIN — Medication 0.2 MG: at 21:02

## 2017-07-22 NOTE — PROGRESS NOTES
Immunosuppression Note:    Camacho Bhagat is a 51 year old male who is seen today  for immunosuppression management     I, Jovan Tran MD, I have examined the patient with the resident/PA/Fellow, discussed and agree with the note and findings.  I have reviewed today's vital signs, medications, labs and imaging. I reviewed the immunosuppression medications and levels. 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 ED for evaluation and management of sepsis secondary to colitis, taken to OR for initial exploratory laparotomy with findings of typhlitis in the right colon, wound left open with wound vac in place for reexploration and interval closure on 7/5/2017.     Graft Function: Good function. US liver normal doppler. Slight increase in Alk phos and AST, improved yesterday, no labs today. Tac dose increased at that time.   Immunosuppression Management:   CellCept 250 mg BID: Held since 6/27/17 due to neutropenia. Continue to hold.   Tac goal level 5-8. Since MMF held will aim for a goal closer to ~8. Prograf 7 mg BID, increased 7/18. 7/21 Level 4.7, 12 hr trough. Will monitor level and change dose as needed.  Cardiorespiratory: Intubated for ex lap, initially requiring pressor support. HDS. Moderate right sided pleural effusion. Chest tube placed 7/9 that was removed 7/12. HDS.   -Difficult to have patient do aggressive pulmonary toilet. Albuterol/Mucomyst neb x 4 doses and started acapella. Patient to be OOB to chair as much as possible.   -CXR- pulmonary edema. RR increased. Lasix IV frequently  Hematology: Pancytopenia, acute on chronic, secondary to medications. MMF and bactrim held (since 6/27). Valcyte held on admission. Neupogen 480 mcg given on 7/4 to 7/7,   CMV seronegative and donor seropositive.   -7/3 CMV PCR < 137. 7/10 CMV  7/18 CMV  copies, 7/20 1906 copies. Valcyte ppx dose restarted and discontinued. Started IV Ganciclovir 7/20 due to concern for CMV colitis.  Awaiting colonoscopy to evaluate for CMV colitis. Continue to hold MMF and bactrim.   -Anemia- HGB 7.4 today, stable. DERRELL negative, checked given spherocytes on P smear.    -Cytology ascites fluid, negative for malignancy   GI: Neutropenic colitis.   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 ID recommending colonoscopy, colonsocopy- poor prep, normal mucosa, biopsy obtained, waiting for results  -NJ placed. TF Peptamen titrated at goal, cycled  -C diff negative.  -Start nectar thick full liquids. Aspiration precautions. SLP following.    Fluid/Electrolytes/Renal: EJ, secondary to hypoperfusion, sepsis. Cr 1.7<-1.5 (1.7). BUN 70. Good UO, 2.6 L yesterday. HypoNa, resolved. Hyperkalemia, PTA on florinef- resumed yesterday. K stable. Due to frequent lasix over the last 2 days for hyperkalemia Na is elevated at 150 this AM. Will start D5 @ 100/hour per nephrology and monitor Na. Avoid nephrotoxins.   Endocrine: DM II, on insulin gtt. Endocrine consulted to transition to subQ insulin.  Infectious disease: Started on Zosyn, Vancomycin, Flagyl, Micafungin on admission (7/4/2017). 7/5 Blood cultures, no growth thus far. Abdominal fluid culture collected intraoperatively, gram stain with no organisms, fungal and bacterial culture, GPCs. Bronchoalveolar lavage growing VRE, colonization. Tx ID consulted.  -Fever > 101 for several days, WBC 3.9, stable. 7/13 CT scan negative for abscess, large amount of ascites, mildly prominent lymph nodes-no significant change. 7/15 BAL cx and 7/14 ascites cx unremarkable per ID. Ascitic fluid exudate. Continue ertapenem x 14 days. EBV ,000 copies. Brain MRI negative, less suspicious for PTLD. Immunosuppression remains lowered. CMV viremia as noted above.    -T max 24 hrs-  99.9  -CMV viremia - CMV D+R-, low viral count. IS reduced (MMF held). Started IV Ganciclovir on 07/20 as noted above.   -Possible drug fever. Seroquel stopped due to this  Neuro: Delirium, hold, limit medications that have CNS SE.  Neurology consulted- dx likely toxic metabolic encephalopathy, improving slowly. Video EEG showed mild to moderate encephalopathy. Assist patient with sleep hygiene. Appreciate neurology recs. In view of improved alertness would defer LP till Monday.   Pain: General discomfort, chronic back pain. Patient very restless. Lidoderm patch for back pain.  Oxycodone 5 mg PRN. Restart PTA flexeril qHS   Vascular: Right Arterial line clot 2/2 previous   art line. Vascular consulted. Recommend ASA only. Some evidence of microvascular injury in digits (toes) this is most likely due to injury while patient was on pressor therapy and sepsis. Now with a third toe injury, vascular consulted again. US completed. Plan for ECHO today.   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   Activity: Daily PT/OT  Prophylaxis: PPI, DVT-SCD  Disposition: 7A.    Medical Decision Making: Medium  Admit [id number] (moderate level decision making)    PILLO/Fellow/Resident Provider: Eric Dacosta  Fellow, Transplant surgery  Pager: [phone]    Faculty: Jovan Tran M.D.  __________________________________________________________________  Transplant History:.  3/4/2017 DD OLT with Dr. Tran (Liver), Postoperative day: 140     Interval History:   Overnight events: No acute events o/n. Patient oriented x1.     ROS:   A 10-point review of systems was negative except as noted above.    Meds:    NaCl         insulin glargine  20 Units Subcutaneous Q24H     fludrocortisone  100 mcg Oral BID     ganciclovir (CYTOVENE) intermittent infusion  2.5 mg/kg (Dosing Weight) Intravenous Q12H     insulin isophane human  28 Units Subcutaneous QPM     insulin aspart  1-12 Units Subcutaneous Q4H     aspirin   80 mg Oral Daily     lidocaine  1-2 patch Transdermal Q24h    And     lidocaine   Transdermal Q24H    And     lidocaine   Transdermal Q8H     cyclobenzaprine  10 mg Oral At Bedtime     insulin aspart   Subcutaneous TID w/meals     tacrolimus  7 mg Oral BID IS     cloNIDine  0.2 mg Oral BID     amLODIPine  5 mg Oral Daily     multivitamins with minerals  15 mL Per Feeding Tube Daily     heparin  5,000 Units Subcutaneous Q8H     lidocaine (viscous)  5 mL Topical Once     pantoprazole  40 mg Oral or Feeding Tube Daily     sodium chloride (PF)  3 mL Intracatheter Q8H       Physical Exam:     Admit Weight: 94.2 kg (207 lb 11.2 oz) (SCALE 2)    Current vitals:   /84 (BP Location: Right arm)  Pulse 87  Temp 97.8  F (36.6  C) (Axillary)  Resp 22  Ht 1.829 m (6')  Wt 107 kg (235 lb 12.8 oz)  SpO2 91%  BMI 31.98 kg/m2    Vital sign ranges:    Temp:  [97.8  F (36.6  C)-100.6  F (38.1  C)] 97.8  F (36.6  C)  Pulse:  [87] 87  Heart Rate:  [] 104  Resp:  [10-45] 22  BP: (145-173)/() 145/84  SpO2:  [91 %-100 %] 91 %  Patient Vitals for the past 24 hrs:   BP Temp Temp src Pulse Heart Rate Resp SpO2   07/22/17 0757 145/84 97.8  F (36.6  C) Axillary - 104 22 91 %   07/22/17 0359 149/82 100.2  F (37.9  C) Oral - 104 24 97 %   07/22/17 0005 149/88 98.9  F (37.2  C) Oral - 92 20 98 %   07/21/17 2009 156/75 97.9  F (36.6  C) Oral - 92 20 95 %   07/21/17 1732 167/79 98.6  F (37  C) Oral - 97 22 93 %   07/21/17 1615 155/81 - - - 107 (!) 31 95 %   07/21/17 1610 (!) 168/99 - - - 112 (!) 31 93 %   07/21/17 1605 (!) 158/124 - - - 112 (!) 45 94 %   07/21/17 1600 (!) 146/121 - - - 110 15 94 %   07/21/17 1555 173/85 - - - 110 29 93 %   07/21/17 1550 (!) 159/95 - - - 105 (!) 39 95 %   07/21/17 1545 148/87 - - - 103 (!) 40 95 %   07/21/17 1540 154/90 - - - 105 (!) 41 91 %   07/21/17 1535 (!) 151/96 - - - 104 30 95 %   07/21/17 1530 (!) 163/138 - - - 106 20 94 %   07/21/17 1525 (!) 153/106 - - - 98 18 92 %   07/21/17 1520  159/89 - - - 96 18 97 %   07/21/17 1515 (!) 162/91 - - - 92 27 97 %   07/21/17 1510 152/87 - - - 93 27 100 %   07/21/17 1505 (!) 150/92 - - - 101 10 92 %   07/21/17 1430 - 100.6  F (38.1  C) Oral - - - -   07/21/17 1145 160/83 99.2  F (37.3  C) Oral 87 - 20 94 %     General Appearance: NAD  Skin: normal, warm, dry  Heart: RRR  Lungs: Non labored on 2-3L NC.  Abdomen: The abdomen is soft, midline incision is c/d/i and covered. Chevron incision well healed.   : vazquez is present, yellow urine in bag.   Extremities: BLE trace edema.  Neurologic: alert, orientated x 1. No tremor.    Data:   CMP    Recent Labs  Lab 07/22/17  0638 07/22/17  0313  07/21/17  0615  07/19/17  0621  07/16/17  0326   * 149*  < > 150*  < > 144  < > 148*   POTASSIUM 3.8 4.1  --  4.9  < > 5.9*  < > 5.1   CHLORIDE 116* 118*  --  117*  < > 114*  < > 123*   CO2 23 26  --  25  < > 26  < > 22   * 186*  --  81  < > 146*  < > 128*   BUN 59* 58*  --  70*  < > 67*  < > 71*   CR 1.63* 1.64*  --  1.66*  < > 1.48*  < > 1.66*   GFRESTIMATED 45* 44*  --  44*  < > 50*  < > 44*   GFRESTBLACK 54* 53*  --  53*  < > 60*  < > 53*   JACOB 8.1* 7.8*  --  8.2*  < > 7.7*  < > 7.8*   MAG  --   --   --   --   --   --   --  2.4*   PHOS  --   --   --   --   --   --   --  3.8   ALBUMIN  --   --   --  1.9*  --  2.0*  < > 1.8*   BILITOTAL  --   --   --  0.5  --  0.7  < > 0.3   ALKPHOS  --   --   --  297*  --  201*  < > 134   AST  --   --   --  75*  --  60*  < > 72*   ALT  --   --   --  51  --  50  < > 44   < > = values in this interval not displayed.  CBC    Recent Labs  Lab 07/22/17  0638 07/21/17  0615   HGB 7.4* 7.0*   WBC 4.5 3.9*   * 118*     COAGS    Recent Labs  Lab 07/21/17  1230 07/17/17  0630   INR 1.24* 1.19*      Urinalysis  Recent Labs   Lab Test  07/19/17   1150  07/11/17   1105   06/14/17   1508   04/11/16   1345   COLOR  Yellow  Yellow   < >   --    < >  Yellow   APPEARANCE  Slightly Cloudy  Clear   < >   --    < >  Clear   URINEGLC  Negative   Negative   < >   --    < >  30*   URINEBILI  Negative  Negative   < >   --    < >  Negative   URINEKETONE  Negative  Negative   < >   --    < >  Negative   SG  1.009  1.009   < >   --    < >  1.016   UBLD  Moderate*  Negative   < >   --    < >  Small*   URINEPH  5.0  5.0   < >   --    < >  5.0   PROTEIN  10*  10*   < >   --    < >  30*   NITRITE  Negative  Negative   < >   --    < >  Negative   LEUKEST  Negative  Negative   < >   --    < >  Negative   RBCU  6*  <1   < >   --    < >  1   WBCU  2  <1   < >   --    < >  1   UTPG   --    --    --   1.55*   --   0.41*    < > = values in this interval not displayed.       Cultures:   Blood Cultures x2 7/4/2017 NGTD     Abdominal Fluid Culture 7/4/2017: NGTD    Abdominal Fluid Culture 7/5/17: NGTD    Bronchoalveolar Culture 7/5/17: VRE.     Sputum endotracheal gm stain/culture 7/11/17: >25 PMNs/low power field   Few Mixed gram positive elvie    CT scan:    7/4/2017 CONCLUSION:   1. Right colonic wall thickening suggesting colitis. Follow-up is necessary to confirm resolution in order to exclude colonic mass.  2. Transverse colon is not well distended reducing evaluation for wall thickening.  3. Small amount of ascites.  4. Splenomegaly.  5. Prominent portal and splenic veins. If confirmation of vascular patency is indicated consider follow-up ultrasound of the liver with Doppler.  6. Small right pleural effusion.  7. Stranding in the subcutaneous fat of the ventral pelvis.     Abdominal XR 7/4/2017:   1. No evidence of pneumoperitoneum.  2. Dilated loop of bowel in the central abdomen, likely sigmoid.    XR Chest Portable 1 View 7/4/17:  1. Endotracheal tube tip projects 5.3 cm from the chacorta.  2. Increased bilateral pleural effusions, right greater than left.  3. Unchanged bibasilar patchy opacities.

## 2017-07-22 NOTE — PLAN OF CARE
Problem: Goal Outcome Summary  Goal: Goal Outcome Summary  SLP NOTE: Pt was seen for dysphagia tx this noon hour. The patient is at risk for aspiration given deconditioning, confusion, and no insight into deficits. From an oropharyngeal swallowing perspective, the pt is appropriate for dysphagia diet 2 with nectar thick consistency liquids. The pt should be encouraged to sit as upright as possible for PO with small bites/sips and slow pacing. SLP is following per POC for dysphagia.

## 2017-07-22 NOTE — PROGRESS NOTES
Surgery Cross-Cover Note  7/22/2017 4:43 AM     Na: 150-->149-->149.  Updated Dr Dacosta (transplant fellow)   He suggested to wait for 5:30 am sodium for further management    Cuca Pedersen MD  PGY-1 General Surgery  Pager # [phone]    (Please page the morning team after 6 am)

## 2017-07-22 NOTE — PLAN OF CARE
Problem: Goal Outcome Summary  Goal: Goal Outcome Summary  Outcome: No Change  Alert; disoriented to time, situation; cooperative; neuro status improving.  Afebrile; VSS on 3L NC; pulmonary toileting encouraged.  -178; Novolog administered per ss.  Potassium 3.8; 20mEq replaced.  Sodium 147 (down from 149); D5 infusing at 100mL/hr into PIV.  Pt c/o back pain relieved with prn oxycodone.  Denies nausea.  Speech therapy advanced diet to dysphagia level 2 diet; pt tolerating small amounts.  TF cycled 20:00-08:00.  Crowe with adequate output.  Incision stapled and open to air.  Black areas on digits unchanged; +2 pulses palpable in all extremities; vascular surgery signed off.  Pt up with mechanical lift but only tolerating chair for short periods.  Plan for possible LP Monday.  Continue to encourage pulmonary toileting and activity; notify MD with changes.

## 2017-07-22 NOTE — PROGRESS NOTES
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  He has experienced increased hyperglycemia related to acute stress and enteral feedings.     Glucose over 24 hours noted:    Recent Labs  Lab 07/22/17  1148 07/22/17  0748 07/22/17  0638 07/22/17  0358 07/22/17  0313 07/21/17  2359 07/21/17 2032 07/21/17  1655  07/21/17  0615  07/20/17  0645  07/19/17  0621  07/18/17  0624   GLC  --   --  184*  --  186*  --   --   --   --  81  --  97  --  146*  --  131*   * 143*  --  202*  --  152* 108* 141*  < >  --   < >  --   < >  --   < >  --    < > = values in this interval not displayed.    Plan: continue current management without any change.   -glargine 20 units q24h at 1600.  -NPH 28 units for Nepro 115 cc/h x 14 h, Order reads HOLD NPH if no TFs  -aspart 1 unit per 10 grams carbohydrate if taking PO  -aspart high correction q4h      Lui Avila MD  Endocrinology Fellow  Pager [phone]    Active Diet Order      Dysphagia Diet Level 2 [facility] Altered Nectar Thickened Liquids (pre-thickened or use instant food thickener)  /88 (BP Location: Right arm)  Pulse 87  Temp 98.9  F (37.2  C) (Axillary)  Resp 22  Ht 1.829 m (6')  Wt 107 kg (235 lb 12.8 oz)  SpO2 97%  BMI 31.98 kg/m2    Data:        Recent Labs  Lab 07/22/17  1148 07/22/17  0748 07/22/17  0638 07/22/17  0358 07/22/17  0313 07/21/17  2359 07/21/17 2032 07/21/17  1655  07/21/17  0615  07/20/17  0645  07/19/17  0621  07/18/17  0624   GLC  --   --  184*  --  186*  --   --   --   --  81  --  97  --  146*  --  131*   * 143*  --  202*  --  152* 108* 141*  < >  --   < >  --   < >  --   < >  --    < > = values in this interval not  displayed.  Lab Results   Component Value Date    A1C  07/06/2017     Canceled, Test credited   Below Assay Range  NOTIFIED LEONARD ONEILL AT 0538 ON 7/6/17 BY CHRISSY      A1C 9.4 02/16/2017    A1C 6.2 12/14/2016    A1C 6.1 10/27/2016    A1C 8.3 07/02/2012     ATTENDING NOTE   I have reviewed discussed plan of care with the fellow.    Gisella Espino MD    Division of Diabetes and Endocrinology  Department of Medicine  [phone]

## 2017-07-22 NOTE — PROGRESS NOTES
[facility], [place]   Neurology Daily Note                  Summary:      51 year old male with Hx of liver transplant (3/2017) for ESLD due to CASTAÑEDA and hepatocarcinoma  on Tacrolimus (continued )and cell cept (hold)  who is admitted on 7/4 after laparoscopy for  neutropenic colitis and septic shock. The hospital course has been complicated by development of severe neutropenia (ANC <500),  thrombocytopenia, anemia now improved, pleural effusion, EJ , microvascular injury, radial occlusion, intubation and CMV viremia.       Neurology was consulted due to altered mental status.           Interval: Per nursing notes, no acute events.  Still disoriented to place, time and situation.           Medications:         Current Facility-Administered Medications   Medication     dextrose 5% infusion     insulin glargine (LANTUS) injection 20 Units     fludrocortisone (FLORINEF) tablet 100 mcg     ganciclovir (CYTOVENE) 250 mg in D5W 100 mL intermittent infusion CYTOTOXIC     insulin isophane human (HumuLIN N PEN) injection 28 Units     HOLD florinef     [Rx hold ] fludrocortisone (FLORINEF) tablet 0.1 mg     insulin aspart (NovoLOG) inj (RAPID ACTING)     aspirin suspension 80 mg     oxyCODONE (ROXICODONE) solution 5-10 mg     lidocaine (LIDODERM) 5 % Patch 1-2 patch    And     lidocaine (LIDODERM) patch REMOVAL    And     lidocaine (LIDODERM) patch in PLACE     cyclobenzaprine (FLEXERIL) tablet 10 mg     insulin aspart (NovoLOG) inj (RAPID ACTING)     insulin aspart (NovoLOG) inj (RAPID ACTING)     tacrolimus (PROGRAF - GENERIC EQUIVALENT) suspension 7 mg     HYDROmorphone (PF) (DILAUDID) injection 0.3-0.5 mg     cloNIDine 0.1 mg/mL (CATAPRES) solution 0.2 mg     hydrALAZINE (APRESOLINE) injection 20 mg     amLODIPine (NORVASC) suspension 5 mg     0.9% sodium chloride infusion     senna-docusate (SENOKOT-S;PERICOLACE) 8.6-50 MG per tablet 2 tablet     polyethylene glycol (MIRALAX/GLYCOLAX) Packet  17 g     multivitamins with minerals (CERTAVITE/CEROVITE) liquid 15 mL     heparin sodium PF injection 5,000 Units     lidocaine (viscous) (XYLOCAINE) 2 % solution 5 mL     dextrose 10 % 1,000 mL infusion     pantoprazole (PROTONIX) suspension 40 mg     glucose 40 % gel 15-30 g    Or     dextrose 50 % injection 25-50 mL    Or     glucagon injection 1 mg     lidocaine 1 % 1 mL     lidocaine (LMX4) kit     sodium chloride (PF) 0.9% PF flush 3 mL     sodium chloride (PF) 0.9% PF flush 3 mL     ondansetron (ZOFRAN-ODT) ODT tab 4 mg    Or     ondansetron (ZOFRAN) injection 4 mg     bisacodyl (DULCOLAX) Suppository 10 mg     naloxone (NARCAN) injection 0.1-0.4 mg     acetaminophen (TYLENOL) tablet 650 mg    Or     acetaminophen (TYLENOL) Suppository 650 mg     potassium chloride SA (K-DUR/KLOR-CON M) CR tablet 20-40 mEq     potassium chloride (KLOR-CON) Packet 20-40 mEq     potassium chloride 10 mEq in 100 mL intermittent infusion     potassium chloride 10 mEq in 100 mL intermittent infusion with 10 mg lidocaine     potassium chloride 20 mEq in 50 mL intermittent infusion     sodium phosphate 10 mmol in D5W intermittent infusion     sodium phosphate 15 mmol in D5W intermittent infusion     sodium phosphate 20 mmol in D5W intermittent infusion     sodium phosphate 25 mmol in D5W intermittent infusion     magnesium sulfate 2 g in NS intermittent infusion (PharMEDium or FV Cmpd)     magnesium sulfate 4 g in 100 mL sterile water (premade)           Exam       /81 (BP Location: Right arm)  Pulse 97  Temp 98  F (36.7  C) (Oral)  Resp 20  Ht 1.829 m (6')  Wt 107 kg (235 lb 12.8 oz)  SpO2 97%  BMI 31.98 kg/m2    Constitutional : well-built, laying in bed, agitated  Head: atraumatic, anicteric. See neuroexam  Eyes: see neuroexam  CVC: RRR  LUng: CTAB  Abdomen: distende  extremities: purple finger on R hand and purple toes on feet. Seems to have pain on arms and legs.   Neuroexam     Alert.  Alert, Following commands.  Speech more coherent today.  Oriented to person, place, and year.  Face symmetric  Eyes: motility preserved on horizontal axis, no nystagmus  Strength antigravity in all extremities  Reflexes 2+ upper extremities bilaterally.  1+ patellar bilaterally.  Unable to elicit achilles bilaterally                 Data:       MRI BRain with and without contrast  1. Significant image degradation due to motion artifact, with no  definite acute intracranial pathology. No abnormal contrast enhancing  intracranial lesions.  2. Mucosal thickening in the sphenoid and maxillary sinuses and left  greater than right mastoid fluid are nonspecific in the setting of  recent intubation.                  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 He is on ertapenem and tacrolimues, was recently under sedation beside infection and EJ, as CMV viremia.  His mental status has improved greatly today.  Neurology suggests:    -  No LP at this time   -  Neurology will continue to follow         Patient Seen and discussed with Dr. Kayla Hugo  PGy2 Neurology  [id number]  I saw the patient with the resident.  I agree with the resident note and plan of care.      Lauro Garza MD

## 2017-07-22 NOTE — PROGRESS NOTES
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 The hospital course has been complicated by development of severe neutropenia (ANC <500),  thrombocytopenia, anemia now improved, pleural effusion, EJ , microvascular injury, radial occlusion, intubation and CMV viremia.       Neurology was consulted due to altered mental status.                Medications:         Current Facility-Administered Medications   Medication     dextrose 5% infusion     [START ON 7/22/2017] HOLD MEDICATION     insulin glargine (LANTUS) injection 20 Units     fludrocortisone (FLORINEF) tablet 100 mcg     ganciclovir (CYTOVENE) 250 mg in D5W 100 mL intermittent infusion CYTOTOXIC     insulin isophane human (HumuLIN N PEN) injection 28 Units     HOLD florinef     [Rx hold ] fludrocortisone (FLORINEF) tablet 0.1 mg     insulin aspart (NovoLOG) inj (RAPID ACTING)     aspirin suspension 80 mg     oxyCODONE (ROXICODONE) solution 5-10 mg     lidocaine (LIDODERM) 5 % Patch 1-2 patch    And     lidocaine (LIDODERM) patch REMOVAL    And     lidocaine (LIDODERM) patch in PLACE     cyclobenzaprine (FLEXERIL) tablet 10 mg     insulin aspart (NovoLOG) inj (RAPID ACTING)     insulin aspart (NovoLOG) inj (RAPID ACTING)     tacrolimus (PROGRAF - GENERIC EQUIVALENT) suspension 7 mg     HYDROmorphone (PF) (DILAUDID) injection 0.3-0.5 mg     cloNIDine 0.1 mg/mL (CATAPRES) solution 0.2 mg     hydrALAZINE (APRESOLINE) injection 20 mg     amLODIPine (NORVASC) suspension 5 mg     0.9% sodium chloride infusion     senna-docusate (SENOKOT-S;PERICOLACE) 8.6-50 MG per tablet 2 tablet     polyethylene glycol (MIRALAX/GLYCOLAX) Packet 17 g     multivitamins with minerals (CERTAVITE/CEROVITE)  liquid 15 mL     heparin sodium PF injection 5,000 Units     lidocaine (viscous) (XYLOCAINE) 2 % solution 5 mL     dextrose 10 % 1,000 mL infusion     pantoprazole (PROTONIX) suspension 40 mg     glucose 40 % gel 15-30 g    Or     dextrose 50 % injection 25-50 mL    Or     glucagon injection 1 mg     lidocaine 1 % 1 mL     lidocaine (LMX4) kit     sodium chloride (PF) 0.9% PF flush 3 mL     sodium chloride (PF) 0.9% PF flush 3 mL     ondansetron (ZOFRAN-ODT) ODT tab 4 mg    Or     ondansetron (ZOFRAN) injection 4 mg     bisacodyl (DULCOLAX) Suppository 10 mg     naloxone (NARCAN) injection 0.1-0.4 mg     acetaminophen (TYLENOL) tablet 650 mg    Or     acetaminophen (TYLENOL) Suppository 650 mg     potassium chloride SA (K-DUR/KLOR-CON M) CR tablet 20-40 mEq     potassium chloride (KLOR-CON) Packet 20-40 mEq     potassium chloride 10 mEq in 100 mL intermittent infusion     potassium chloride 10 mEq in 100 mL intermittent infusion with 10 mg lidocaine     potassium chloride 20 mEq in 50 mL intermittent infusion     sodium phosphate 10 mmol in D5W intermittent infusion     sodium phosphate 15 mmol in D5W intermittent infusion     sodium phosphate 20 mmol in D5W intermittent infusion     sodium phosphate 25 mmol in D5W intermittent infusion     magnesium sulfate 2 g in NS intermittent infusion (PharMEDium or FV Cmpd)     magnesium sulfate 4 g in 100 mL sterile water (premade)           Exam       /79 (BP Location: Left arm)  Pulse 87  Temp 98.6  F (37  C) (Oral)  Resp 22  Ht 1.829 m (6')  Wt 107 kg (235 lb 12.8 oz)  SpO2 93%  BMI 31.98 kg/m2    Constitutional : well-built, laying in bed, agitated  Head: atraumatic, anicteric. See neuroexam  Eyes: see neuroexam  CVC: sinus on monitor  LUng: tachypneic  Abdomen: distende  extremities: purple finger on R hand and purple toes on feet. Seems to have pain on arms and legs.   Neuroexam     Alert.  Alert, Following commands. His speech is a salad of words for the  majority of time, but could recognize his dog.   Eye movements are preserved  Face symmetric  Eyes: motility preserved on horizontal axis, no nystagmus  Strength is preserved  Reflexes are preserved on arms, absent on legs.                  Data:       MRI BRain with and without contrast  1. Significant image degradation due to motion artifact, with no  definite acute intracranial pathology. No abnormal contrast enhancing  intracranial lesions.  2. Mucosal thickening in the sphenoid and maxillary sinuses and left  greater than right mastoid fluid are nonspecific in the setting of  recent intubation.                  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 He is on ertapenem and tacrolimues, was recently under sedation beside infection and EJ, as CMV viremia.  His encephalopathy is not improving, in a context of a a immunocompromised patient:  Neurology suggests:    -  LP  ---NEurology team called family, but family was not available to consent procedure.   ---Please hold heparin        Neurology will keep following       PAtient Seen and discussed with Dr. Glynn Mcmullen  PGy4 Neurology  [id number]   I REVIEWED the patient with the resident.  I agree with the resident note and plan of care.      Lauro Garza MD

## 2017-07-22 NOTE — PLAN OF CARE
"Problem: Individualization  Goal: Patient Preferences  Outcome: No Change     RN:  AVSS,O2 sat 97-98% at 3L/NC, denies pain and nausea, patient continue to be confused, disoriented to place, time and situation but oriented to his dog \"Rock\", attendant at bedside. TF @115cc/hr x 12hours, blood glucose 152 and 202, SS and Humulin given. MIV D5W@100cc/hr for Na 149, rechecked at 0300 was still 149. Incontinent of stool x4, Urethral catheter with adequate urine output. Will continue to monitor.        "

## 2017-07-22 NOTE — PROGRESS NOTES
No acute issues  Right foot warm with palpable pulses  Right 1/2 toes with black tips  YUMIKO no clot seen  Imp   toe ischemia likely 2/2 pressors  No vascular intervention indicated  Can follow up with ortho as outpatient to manage toes  Vascular will sign off  Seen with dr. wanda Daley

## 2017-07-23 ENCOUNTER — APPOINTMENT (OUTPATIENT)
Dept: SPEECH THERAPY | Facility: CLINIC | Age: 52
End: 2017-07-23
Attending: TRANSPLANT SURGERY
Payer: COMMERCIAL

## 2017-07-23 LAB
ALBUMIN SERPL-MCNC: 1.9 G/DL (ref 3.4–5)
ALP SERPL-CCNC: 224 U/L (ref 40–150)
ALT SERPL W P-5'-P-CCNC: 47 U/L (ref 0–70)
ANION GAP SERPL CALCULATED.3IONS-SCNC: 9 MMOL/L (ref 3–14)
AST SERPL W P-5'-P-CCNC: 63 U/L (ref 0–45)
BASOPHILS # BLD AUTO: 0 10E9/L (ref 0–0.2)
BASOPHILS NFR BLD AUTO: 0.6 %
BILIRUB DIRECT SERPL-MCNC: 0.2 MG/DL (ref 0–0.2)
BILIRUB SERPL-MCNC: 0.3 MG/DL (ref 0.2–1.3)
BLD PROD TYP BPU: NORMAL
BLD UNIT ID BPU: 0
BLOOD PRODUCT CODE: NORMAL
BPU ID: NORMAL
BUN SERPL-MCNC: 62 MG/DL (ref 7–30)
CALCIUM SERPL-MCNC: 7.6 MG/DL (ref 8.5–10.1)
CHLORIDE SERPL-SCNC: 112 MMOL/L (ref 94–109)
CO2 SERPL-SCNC: 23 MMOL/L (ref 20–32)
CREAT SERPL-MCNC: 1.76 MG/DL (ref 0.66–1.25)
DIFFERENTIAL METHOD BLD: ABNORMAL
EOSINOPHIL # BLD AUTO: 0.1 10E9/L (ref 0–0.7)
EOSINOPHIL NFR BLD AUTO: 1.8 %
ERYTHROCYTE [DISTWIDTH] IN BLOOD BY AUTOMATED COUNT: 15.9 % (ref 10–15)
GFR SERPL CREATININE-BSD FRML MDRD: 41 ML/MIN/1.7M2
GLUCOSE BLDC GLUCOMTR-MCNC: 103 MG/DL (ref 70–99)
GLUCOSE BLDC GLUCOMTR-MCNC: 116 MG/DL (ref 70–99)
GLUCOSE BLDC GLUCOMTR-MCNC: 129 MG/DL (ref 70–99)
GLUCOSE BLDC GLUCOMTR-MCNC: 159 MG/DL (ref 70–99)
GLUCOSE BLDC GLUCOMTR-MCNC: 212 MG/DL (ref 70–99)
GLUCOSE BLDC GLUCOMTR-MCNC: 225 MG/DL (ref 70–99)
GLUCOSE BLDC GLUCOMTR-MCNC: 237 MG/DL (ref 70–99)
GLUCOSE BLDC GLUCOMTR-MCNC: 67 MG/DL (ref 70–99)
GLUCOSE BLDC GLUCOMTR-MCNC: 76 MG/DL (ref 70–99)
GLUCOSE BLDC GLUCOMTR-MCNC: 77 MG/DL (ref 70–99)
GLUCOSE BLDC GLUCOMTR-MCNC: 78 MG/DL (ref 70–99)
GLUCOSE SERPL-MCNC: 237 MG/DL (ref 70–99)
HCT VFR BLD AUTO: 22.6 % (ref 40–53)
HGB BLD-MCNC: 6.9 G/DL (ref 13.3–17.7)
IMM GRANULOCYTES # BLD: 0 10E9/L (ref 0–0.4)
IMM GRANULOCYTES NFR BLD: 0.4 %
LACTATE BLD-SCNC: 1 MMOL/L (ref 0.7–2.1)
LYMPHOCYTES # BLD AUTO: 2.1 10E9/L (ref 0.8–5.3)
LYMPHOCYTES NFR BLD AUTO: 42.9 %
MCH RBC QN AUTO: 27.4 PG (ref 26.5–33)
MCHC RBC AUTO-ENTMCNC: 30.5 G/DL (ref 31.5–36.5)
MCV RBC AUTO: 90 FL (ref 78–100)
MONOCYTES # BLD AUTO: 0.3 10E9/L (ref 0–1.3)
MONOCYTES NFR BLD AUTO: 6.4 %
NEUTROPHILS # BLD AUTO: 2.3 10E9/L (ref 1.6–8.3)
NEUTROPHILS NFR BLD AUTO: 47.9 %
NRBC # BLD AUTO: 0 10*3/UL
NRBC BLD AUTO-RTO: 0 /100
PLATELET # BLD AUTO: 120 10E9/L (ref 150–450)
POTASSIUM SERPL-SCNC: 3.8 MMOL/L (ref 3.4–5.3)
PROT SERPL-MCNC: 6 G/DL (ref 6.8–8.8)
RBC # BLD AUTO: 2.52 10E12/L (ref 4.4–5.9)
SODIUM SERPL-SCNC: 144 MMOL/L (ref 133–144)
TACROLIMUS BLD-MCNC: 4.8 UG/L (ref 5–15)
TME LAST DOSE: ABNORMAL H
TRANSFUSION STATUS PATIENT QL: NORMAL
TRANSFUSION STATUS PATIENT QL: NORMAL
WBC # BLD AUTO: 4.9 10E9/L (ref 4–11)

## 2017-07-23 PROCEDURE — 25000131 ZZH RX MED GY IP 250 OP 636 PS 637: Performed by: PHYSICIAN ASSISTANT

## 2017-07-23 PROCEDURE — 25000128 H RX IP 250 OP 636: Performed by: NURSE PRACTITIONER

## 2017-07-23 PROCEDURE — 83605 ASSAY OF LACTIC ACID: CPT | Performed by: INTERNAL MEDICINE

## 2017-07-23 PROCEDURE — 25000125 ZZHC RX 250: Performed by: PHYSICIAN ASSISTANT

## 2017-07-23 PROCEDURE — 80048 BASIC METABOLIC PNL TOTAL CA: CPT | Performed by: PHYSICIAN ASSISTANT

## 2017-07-23 PROCEDURE — 25000132 ZZH RX MED GY IP 250 OP 250 PS 637: Performed by: TRANSPLANT SURGERY

## 2017-07-23 PROCEDURE — 25000132 ZZH RX MED GY IP 250 OP 250 PS 637: Performed by: STUDENT IN AN ORGANIZED HEALTH CARE EDUCATION/TRAINING PROGRAM

## 2017-07-23 PROCEDURE — 80197 ASSAY OF TACROLIMUS: CPT | Performed by: PHYSICIAN ASSISTANT

## 2017-07-23 PROCEDURE — 86923 COMPATIBILITY TEST ELECTRIC: CPT | Performed by: TRANSPLANT SURGERY

## 2017-07-23 PROCEDURE — 92526 ORAL FUNCTION THERAPY: CPT | Mod: GN

## 2017-07-23 PROCEDURE — 25000128 H RX IP 250 OP 636: Performed by: STUDENT IN AN ORGANIZED HEALTH CARE EDUCATION/TRAINING PROGRAM

## 2017-07-23 PROCEDURE — 27210432 ZZH NUTRITION PRODUCT RENAL BASIC LITER

## 2017-07-23 PROCEDURE — 80076 HEPATIC FUNCTION PANEL: CPT | Performed by: PHYSICIAN ASSISTANT

## 2017-07-23 PROCEDURE — P9016 RBC LEUKOCYTES REDUCED: HCPCS | Performed by: TRANSPLANT SURGERY

## 2017-07-23 PROCEDURE — 36415 COLL VENOUS BLD VENIPUNCTURE: CPT | Performed by: PHYSICIAN ASSISTANT

## 2017-07-23 PROCEDURE — 25000128 H RX IP 250 OP 636: Performed by: PHYSICIAN ASSISTANT

## 2017-07-23 PROCEDURE — 85025 COMPLETE CBC W/AUTO DIFF WBC: CPT | Performed by: PHYSICIAN ASSISTANT

## 2017-07-23 PROCEDURE — 40000225 ZZH STATISTIC SLP WARD VISIT

## 2017-07-23 PROCEDURE — 25000131 ZZH RX MED GY IP 250 OP 636 PS 637: Performed by: NURSE PRACTITIONER

## 2017-07-23 PROCEDURE — 00000146 ZZHCL STATISTIC GLUCOSE BY METER IP

## 2017-07-23 PROCEDURE — 25000132 ZZH RX MED GY IP 250 OP 250 PS 637: Performed by: NURSE PRACTITIONER

## 2017-07-23 PROCEDURE — 86900 BLOOD TYPING SEROLOGIC ABO: CPT | Performed by: TRANSPLANT SURGERY

## 2017-07-23 PROCEDURE — 25000132 ZZH RX MED GY IP 250 OP 250 PS 637: Performed by: PHYSICIAN ASSISTANT

## 2017-07-23 PROCEDURE — 86901 BLOOD TYPING SEROLOGIC RH(D): CPT | Performed by: TRANSPLANT SURGERY

## 2017-07-23 PROCEDURE — 86850 RBC ANTIBODY SCREEN: CPT | Performed by: TRANSPLANT SURGERY

## 2017-07-23 PROCEDURE — 36415 COLL VENOUS BLD VENIPUNCTURE: CPT | Performed by: INTERNAL MEDICINE

## 2017-07-23 PROCEDURE — 25000131 ZZH RX MED GY IP 250 OP 636 PS 637: Performed by: TRANSPLANT SURGERY

## 2017-07-23 PROCEDURE — 12000025 ZZH R&B TRANSPLANT INTERMEDIATE

## 2017-07-23 PROCEDURE — 25000132 ZZH RX MED GY IP 250 OP 250 PS 637: Performed by: SURGERY

## 2017-07-23 RX ORDER — SODIUM CHLORIDE 9 MG/ML
INJECTION, SOLUTION INTRAVENOUS
Status: DISCONTINUED
Start: 2017-07-23 | End: 2017-07-25 | Stop reason: HOSPADM

## 2017-07-23 RX ADMIN — HEPARIN SODIUM 5000 UNITS: 5000 INJECTION, SOLUTION INTRAVENOUS; SUBCUTANEOUS at 05:36

## 2017-07-23 RX ADMIN — LIDOCAINE 2 PATCH: 50 PATCH CUTANEOUS at 21:34

## 2017-07-23 RX ADMIN — Medication 80 MG: at 08:33

## 2017-07-23 RX ADMIN — SODIUM CHLORIDE: 9 INJECTION, SOLUTION INTRAVENOUS at 19:02

## 2017-07-23 RX ADMIN — PANTOPRAZOLE SODIUM 40 MG: 40 TABLET, DELAYED RELEASE ORAL at 08:34

## 2017-07-23 RX ADMIN — GANCICLOVIR SODIUM 250 MG: 500 INJECTION, POWDER, LYOPHILIZED, FOR SOLUTION INTRAVENOUS at 17:26

## 2017-07-23 RX ADMIN — HEPARIN SODIUM 5000 UNITS: 5000 INJECTION, SOLUTION INTRAVENOUS; SUBCUTANEOUS at 21:34

## 2017-07-23 RX ADMIN — TACROLIMUS 8 MG: 5 CAPSULE ORAL at 17:26

## 2017-07-23 RX ADMIN — INSULIN ASPART 5 UNITS: 100 INJECTION, SOLUTION INTRAVENOUS; SUBCUTANEOUS at 09:33

## 2017-07-23 RX ADMIN — AMLODIPINE BESYLATE 5 MG: 10 TABLET ORAL at 08:34

## 2017-07-23 RX ADMIN — Medication 0.2 MG: at 08:34

## 2017-07-23 RX ADMIN — FLUDROCORTISONE ACETATE 100 MCG: 0.1 TABLET ORAL at 21:33

## 2017-07-23 RX ADMIN — DEXTROSE MONOHYDRATE: 50 INJECTION, SOLUTION INTRAVENOUS at 04:46

## 2017-07-23 RX ADMIN — GANCICLOVIR SODIUM 250 MG: 500 INJECTION, POWDER, LYOPHILIZED, FOR SOLUTION INTRAVENOUS at 05:41

## 2017-07-23 RX ADMIN — TACROLIMUS 7 MG: 5 CAPSULE ORAL at 08:33

## 2017-07-23 RX ADMIN — MULTIVIT AND MINERALS-FERROUS GLUCONATE 9 MG IRON/15 ML ORAL LIQUID 15 ML: at 08:34

## 2017-07-23 RX ADMIN — CYCLOBENZAPRINE HYDROCHLORIDE 10 MG: 10 TABLET, FILM COATED ORAL at 21:33

## 2017-07-23 RX ADMIN — FLUDROCORTISONE ACETATE 100 MCG: 0.1 TABLET ORAL at 08:34

## 2017-07-23 RX ADMIN — HEPARIN SODIUM 5000 UNITS: 5000 INJECTION, SOLUTION INTRAVENOUS; SUBCUTANEOUS at 13:35

## 2017-07-23 RX ADMIN — OXYCODONE HYDROCHLORIDE 5 MG: 5 SOLUTION ORAL at 21:35

## 2017-07-23 RX ADMIN — Medication 0.2 MG: at 21:34

## 2017-07-23 RX ADMIN — POTASSIUM CHLORIDE 20 MEQ: 1.5 POWDER, FOR SOLUTION ORAL at 08:34

## 2017-07-23 RX ADMIN — OXYCODONE HYDROCHLORIDE 5 MG: 5 SOLUTION ORAL at 15:46

## 2017-07-23 RX ADMIN — INSULIN ASPART 3 UNITS: 100 INJECTION, SOLUTION INTRAVENOUS; SUBCUTANEOUS at 13:33

## 2017-07-23 RX ADMIN — OXYCODONE HYDROCHLORIDE 5 MG: 5 SOLUTION ORAL at 08:34

## 2017-07-23 NOTE — PROGRESS NOTES
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 The hospital course has been complicated by development of severe neutropenia (ANC <500),  thrombocytopenia, anemia now improved, pleural effusion, EJ , microvascular injury, radial occlusion, intubation and CMV viremia.       Neurology was consulted due to altered mental status.                      Medications:          Current Facility-Administered Medications   Medication     tacrolimus (PROGRAF - GENERIC EQUIVALENT) suspension 8 mg     insulin isophane human (HumuLIN N PEN) injection 34 Units     insulin glargine (LANTUS) injection 20 Units     NaCl 0.9 % infusion     fludrocortisone (FLORINEF) tablet 100 mcg     ganciclovir (CYTOVENE) 250 mg in D5W 100 mL intermittent infusion CYTOTOXIC     HOLD florinef     [Rx hold ] fludrocortisone (FLORINEF) tablet 0.1 mg     insulin aspart (NovoLOG) inj (RAPID ACTING)     aspirin suspension 80 mg     oxyCODONE (ROXICODONE) solution 5-10 mg     lidocaine (LIDODERM) 5 % Patch 1-2 patch    And     lidocaine (LIDODERM) patch REMOVAL    And     lidocaine (LIDODERM) patch in PLACE     cyclobenzaprine (FLEXERIL) tablet 10 mg     insulin aspart (NovoLOG) inj (RAPID ACTING)     insulin aspart (NovoLOG) inj (RAPID ACTING)     HYDROmorphone (PF) (DILAUDID) injection 0.3-0.5 mg     cloNIDine 0.1 mg/mL (CATAPRES) solution 0.2 mg     hydrALAZINE (APRESOLINE) injection 20 mg     amLODIPine (NORVASC) suspension 5 mg     0.9% sodium chloride infusion     senna-docusate (SENOKOT-S;PERICOLACE) 8.6-50 MG per tablet 2 tablet     polyethylene glycol (MIRALAX/GLYCOLAX) Packet 17 g     multivitamins with minerals (CERTAVITE/CEROVITE) liquid 15 mL     heparin sodium PF  injection 5,000 Units     lidocaine (viscous) (XYLOCAINE) 2 % solution 5 mL     dextrose 10 % 1,000 mL infusion     pantoprazole (PROTONIX) suspension 40 mg     glucose 40 % gel 15-30 g    Or     dextrose 50 % injection 25-50 mL    Or     glucagon injection 1 mg     lidocaine 1 % 1 mL     lidocaine (LMX4) kit     sodium chloride (PF) 0.9% PF flush 3 mL     sodium chloride (PF) 0.9% PF flush 3 mL     ondansetron (ZOFRAN-ODT) ODT tab 4 mg    Or     ondansetron (ZOFRAN) injection 4 mg     bisacodyl (DULCOLAX) Suppository 10 mg     naloxone (NARCAN) injection 0.1-0.4 mg     acetaminophen (TYLENOL) tablet 650 mg    Or     acetaminophen (TYLENOL) Suppository 650 mg     potassium chloride SA (K-DUR/KLOR-CON M) CR tablet 20-40 mEq     potassium chloride (KLOR-CON) Packet 20-40 mEq     potassium chloride 10 mEq in 100 mL intermittent infusion     potassium chloride 10 mEq in 100 mL intermittent infusion with 10 mg lidocaine     potassium chloride 20 mEq in 50 mL intermittent infusion     sodium phosphate 10 mmol in D5W intermittent infusion     sodium phosphate 15 mmol in D5W intermittent infusion     sodium phosphate 20 mmol in D5W intermittent infusion     sodium phosphate 25 mmol in D5W intermittent infusion     magnesium sulfate 2 g in NS intermittent infusion (PharMEDium or FV Cmpd)     magnesium sulfate 4 g in 100 mL sterile water (premade)         Exam       /85 (BP Location: Right arm)  Pulse 109  Temp 100.5  F (38.1  C) (Oral)  Resp 22  Ht 1.829 m (6')  Wt 107 kg (235 lb 12.8 oz)  SpO2 94%  BMI 31.98 kg/m2    Constitutional : well-built, laying in bed, calm, participating with exam  Head: atraumatic, anicteric. See neuroexam  Eyes: see neuroexam  CVC: RRR  LUng: CTAB  Abdomen: distende  extremities: purple finger on R hand and purple toes on feet. Seems to have pain on arms and legs.   Neuroexam      Alert.  Alert, Following commands. Speech more coherent today.  Oriented to person, place, and year. Can  make 1 serial count.   Face symmetric  Eyes: motility preserved on horizontal axis, no nystagmus  Strength antigravity in all extremities  Reflexes 2+ upper extremities bilaterally.  1+ patellar bilaterally.  Unable to elicit achilles bilaterally                 Data:       MRI BRain with and without contrast  1. Significant image degradation due to motion artifact, with no  definite acute intracranial pathology. No abnormal contrast enhancing  intracranial lesions.  2. Mucosal thickening in the sphenoid and maxillary sinuses and left  greater than right mastoid fluid are nonspecific in the setting of  recent intubation.                   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 He is on ertapenem and tacrolimues, was recently under sedation beside infection and EJ, as CMV viremia.  His mental status has improved greatly in the last 2 days.  Neurology suggests:    -  No LP at this time             -Neurology signs out    Thank you for this consult, please contact Neurology Consult service through pager.           Patient Seen and discussed with Dr. Kayla Sands  PGY4 NEurology.  1340  I saw the patient with the resident.  I agree with the resident note and plan of care.      Lauro Garza MD

## 2017-07-23 NOTE — PLAN OF CARE
Problem: Individualization  Goal: Patient Preferences  Outcome: No Change     RN:  ANNISS, lower back pain controlled with Oxycodone, given x1, denies nausea, patient intermittently confused, attendant at bedside. Restarted TF@115cc/hr x12 hours (8pm to 8am), blood glucose 200's, SS and Humulin given. Continue D5W@100cc/hr. Urethral catheter with adequate urine output, incontinent of stool x1. Patient resting between cares, will continue to monitor.

## 2017-07-23 NOTE — PLAN OF CARE
Problem: Goal Outcome Summary  Goal: Goal Outcome Summary     SLP 7A: Pt seen for dysphagia tx. Not able to achieve optimal positioning, but improved upright positioning achieved with encouragement. Pt appropriate to advance to dysphagia diet level 3 and thin liquids. Pt should be upright, alert, take small bites/sips and alternate consistencies. Reminders helpful. SLP to follow

## 2017-07-23 NOTE — PROGRESS NOTES
Immunosuppression Note:    Camacho Bhagat is a 51 year old male who is seen today  for immunosuppression management     I, Jovan Tran MD, I have examined the patient with the resident/PA/Fellow, discussed and agree with the note and findings.  I have reviewed today's vital signs, medications, labs and imaging. I reviewed the immunosuppression medications and levels. I spoke to the patient/family and explained below clinical details and answered all the questions      Transplant Surgery  Inpatient Daily Progress Note  07/23/2017    Assessment & Plan: Camacho Bhagat is a 51 yo M with a history of ESLD due to CASTAÑEDA s/p DD OLT 3/4/17 admitted 7/4/17 from [facility] ED for evaluation and management of sepsis secondary to colitis, taken to OR for initial exploratory laparotomy with findings of typhlitis in the right colon, wound left open with wound vac in place for reexploration and interval closure on 7/5/2017.     Graft Function: Good function. US liver normal doppler. Slight increase in Alk phos and AST, improved yesterday, no labs today. Tac dose increased at that time.   Immunosuppression Management:   CellCept 250 mg BID: Held since 6/27/17 due to neutropenia. Continue to hold.   Tac goal level 5-8. Since MMF held will aim for a goal closer to ~8. Prograf 7 mg BID, increased 7/18. 7/21 Level 3.8, 12 hr trough. Will increase dose to 8mg PO BID  Cardiorespiratory: Intubated for ex lap, initially requiring pressor support. HDS. Moderate right sided pleural effusion. Chest tube placed 7/9 that was removed 7/12. HDS.   -Difficult to have patient do aggressive pulmonary toilet. Albuterol/Mucomyst neb x 4 doses and started acapella. Patient to be OOB to chair as much as possible.   -CXR- pulmonary edema. RR increased. Lasix IV frequently  Hematology: Pancytopenia, acute on chronic, secondary to medications. MMF and bactrim held (since 6/27). Valcyte held on admission. Neupogen 480 mcg given on 7/4 to 7/7,  CMV  seronegative and donor seropositive.   -7/3 CMV PCR < 137. 7/10 CMV  7/18 CMV  copies, 7/20 1906 copies. Valcyte ppx dose restarted and discontinued. Started IV Ganciclovir 7/20 due to concern for CMV colitis.  Awaiting colonoscopy to evaluate for CMV colitis. Continue to hold MMF and bactrim.   -Anemia- HGB 6.9 today, stable. DERRELL negative, checked given spherocytes on P smear. Will transfuse one unit of PRBC today and follow with 40mg IV lasix.   -Cytology ascites fluid, negative for malignancy   GI: Neutropenic colitis.   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 ID recommending colonoscopy, colonsocopy- poor prep, normal mucosa, biopsy obtained, waiting for results  -NJ placed. TF Peptamen titrated at goal, cycled  -C diff negative.  -Start nectar thick full liquids. Aspiration precautions. SLP following.    Fluid/Electrolytes/Renal: EJ, secondary to hypoperfusion, sepsis. Cr 1.7<-1.5 (1.7). BUN 70. Good UO, 2.6 L yesterday. HypoNa, resolved. Hyperkalemia, PTA on florinef- resumed yesterday. K stable. Due to frequent lasix over the last 2 days for hyperkalemia Na is elevated at 150 this AM. Will start D5 @ 100/hour per nephrology and monitor Na. Avoid nephrotoxins.   Endocrine: DM II, on insulin gtt. Endocrine consulted to transition to subQ insulin.  Infectious disease: Started on Zosyn, Vancomycin, Flagyl, Micafungin on admission (7/4/2017). 7/5 Blood cultures, no growth thus far. Abdominal fluid culture collected intraoperatively, gram stain with no organisms, fungal and bacterial culture, GPCs. Bronchoalveolar lavage growing VRE, colonization. Tx ID consulted.  -Fever > 101 for several days, WBC 3.9, stable. 7/13 CT scan negative for abscess, large amount of ascites, mildly prominent lymph nodes-no significant change. 7/15 BAL cx and 7/14 ascites cx unremarkable per ID. Ascitic fluid exudate. Continue ertapenem x 14 days. EBV ,000 copies. Brain MRI negative, less suspicious for PTLD. Immunosuppression  remains lowered. CMV viremia as noted above.    -T max 24 hrs- 99.9  -CMV viremia - CMV D+R-, low viral count. IS reduced (MMF held). Started IV Ganciclovir on 07/20 as noted above.   -Possible drug fever. Seroquel stopped due to this  Neuro: Delirium, hold, limit medications that have CNS SE.  Neurology consulted- dx likely toxic metabolic encephalopathy, improving slowly. Video EEG showed mild to moderate encephalopathy. Assist patient with sleep hygiene. Appreciate neurology recs. In view of improved alertness would defer LP till Monday.   Pain: General discomfort, chronic back pain. Patient very restless. Lidoderm patch for back pain.  Oxycodone 5 mg PRN. Restart PTA flexeril qHS   Vascular: Right Arterial line clot 2/2 previous   art line. Vascular consulted. Recommend ASA only. Some evidence of microvascular injury in digits (toes) this is most likely due to injury while patient was on pressor therapy and sepsis. Now with a third toe injury, vascular consulted again. US completed. Plan for ECHO today.   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   Activity: Daily PT/OT  Prophylaxis: PPI, DVT-SCD  Disposition: 7A.    Medical Decision Making: Medium  Admit [id number] (moderate level decision making)    PILLO/Fellow/Resident Provider: Eric Dacosta  Fellow, Transplant surgery  Pager: [phone]    Faculty: Jovan Tran M.D.  __________________________________________________________________  Transplant History:.  3/4/2017 DD OLT with Dr. Tran (Liver), Postoperative day: 141     Interval History:   Overnight events: No acute events o/n. Patient oriented x1.     ROS:   A 10-point review of systems was negative except as noted above.    Meds:    insulin isophane human  32 Units Subcutaneous QPM     tacrolimus  8 mg Oral BID IS     insulin glargine  20 Units Subcutaneous Q24H     fludrocortisone  100 mcg Oral BID     ganciclovir (CYTOVENE) intermittent infusion  2.5 mg/kg (Dosing  Weight) Intravenous Q12H     insulin aspart  1-12 Units Subcutaneous Q4H     aspirin  80 mg Oral Daily     lidocaine  1-2 patch Transdermal Q24h    And     lidocaine   Transdermal Q24H    And     lidocaine   Transdermal Q8H     cyclobenzaprine  10 mg Oral At Bedtime     insulin aspart   Subcutaneous TID w/meals     cloNIDine  0.2 mg Oral BID     amLODIPine  5 mg Oral Daily     multivitamins with minerals  15 mL Per Feeding Tube Daily     heparin  5,000 Units Subcutaneous Q8H     lidocaine (viscous)  5 mL Topical Once     pantoprazole  40 mg Oral or Feeding Tube Daily     sodium chloride (PF)  3 mL Intracatheter Q8H       Physical Exam:     Admit Weight: 94.2 kg (207 lb 11.2 oz) (SCALE 2)    Current vitals:   /87 (BP Location: Right arm)  Pulse 104  Temp 98.8  F (37.1  C) (Oral)  Resp 22  Ht 1.829 m (6')  Wt 107 kg (235 lb 12.8 oz)  SpO2 95%  BMI 31.98 kg/m2    Vital sign ranges:    Temp:  [98  F (36.7  C)-98.9  F (37.2  C)] 98.8  F (37.1  C)  Pulse:  [] 104  Heart Rate:  [] 104  Resp:  [15-28] 22  BP: (142-165)/(60-88) 165/87  SpO2:  [92 %-97 %] 95 %  Patient Vitals for the past 24 hrs:   BP Temp Temp src Pulse Heart Rate Resp SpO2   07/23/17 0823 165/87 - - 104 104 22 95 %   07/23/17 0733 161/60 98.8  F (37.1  C) Oral 109 109 28 93 %   07/23/17 0404 142/88 98.8  F (37.1  C) Oral - 100 19 94 %   07/23/17 0000 154/83 98.9  F (37.2  C) Oral - 90 15 93 %   07/22/17 2056 151/80 98  F (36.7  C) Oral 91 91 20 92 %   07/22/17 1605 147/81 98  F (36.7  C) Oral 97 97 20 97 %   07/22/17 1158 161/88 98.9  F (37.2  C) Axillary - 100 22 97 %     General Appearance: NAD  Skin: normal, warm, dry  Heart: RRR  Lungs: Non labored on 2-3L NC.  Abdomen: The abdomen is soft, midline incision is c/d/i and covered. Chevron incision well healed.   : vazquez is present, yellow urine in bag.   Extremities: BLE trace edema.  Neurologic: alert, orientated x 1. No tremor.    Data:   CMP    Recent Labs  Lab 07/23/17  0625  07/22/17  0638  07/21/17  0615    147*  < > 150*   POTASSIUM 3.8 3.8  < > 4.9   CHLORIDE 112* 116*  < > 117*   CO2 23 23  < > 25   * 184*  < > 81   BUN 62* 59*  < > 70*   CR 1.76* 1.63*  < > 1.66*   GFRESTIMATED 41* 45*  < > 44*   GFRESTBLACK 49* 54*  < > 53*   JACOB 7.6* 8.1*  < > 8.2*   ALBUMIN 1.9*  --   --  1.9*   BILITOTAL 0.3  --   --  0.5   ALKPHOS 224*  --   --  297*   AST 63*  --   --  75*   ALT 47  --   --  51   < > = values in this interval not displayed.  CBC    Recent Labs  Lab 07/23/17  0625 07/22/17  0638   HGB 6.9* 7.4*   WBC 4.9 4.5   * 136*     COAGS    Recent Labs  Lab 07/21/17  1230 07/17/17  0630   INR 1.24* 1.19*      Urinalysis  Recent Labs   Lab Test  07/19/17   1150  07/11/17   1105   06/14/17   1508   04/11/16   1345   COLOR  Yellow  Yellow   < >   --    < >  Yellow   APPEARANCE  Slightly Cloudy  Clear   < >   --    < >  Clear   URINEGLC  Negative  Negative   < >   --    < >  30*   URINEBILI  Negative  Negative   < >   --    < >  Negative   URINEKETONE  Negative  Negative   < >   --    < >  Negative   SG  1.009  1.009   < >   --    < >  1.016   UBLD  Moderate*  Negative   < >   --    < >  Small*   URINEPH  5.0  5.0   < >   --    < >  5.0   PROTEIN  10*  10*   < >   --    < >  30*   NITRITE  Negative  Negative   < >   --    < >  Negative   LEUKEST  Negative  Negative   < >   --    < >  Negative   RBCU  6*  <1   < >   --    < >  1   WBCU  2  <1   < >   --    < >  1   UTPG   --    --    --   1.55*   --   0.41*    < > = values in this interval not displayed.       Cultures:   Blood Cultures x2 7/4/2017 NGTD     Abdominal Fluid Culture 7/4/2017: NGTD    Abdominal Fluid Culture 7/5/17: NGTD    Bronchoalveolar Culture 7/5/17: VRE.     Sputum endotracheal gm stain/culture 7/11/17: >25 PMNs/low power field   Few Mixed gram positive elvie    CT scan:    7/4/2017 CONCLUSION:   1. Right colonic wall thickening suggesting colitis. Follow-up is necessary to confirm resolution in order  to exclude colonic mass.  2. Transverse colon is not well distended reducing evaluation for wall thickening.  3. Small amount of ascites.  4. Splenomegaly.  5. Prominent portal and splenic veins. If confirmation of vascular patency is indicated consider follow-up ultrasound of the liver with Doppler.  6. Small right pleural effusion.  7. Stranding in the subcutaneous fat of the ventral pelvis.     Abdominal XR 7/4/2017:   1. No evidence of pneumoperitoneum.  2. Dilated loop of bowel in the central abdomen, likely sigmoid.    XR Chest Portable 1 View 7/4/17:  1. Endotracheal tube tip projects 5.3 cm from the chacorta.  2. Increased bilateral pleural effusions, right greater than left.  3. Unchanged bibasilar patchy opacities.

## 2017-07-23 NOTE — PROGRESS NOTES
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     Graft Function: Good function. US liver normal doppler. Slight increase in Alk phos and AST, improved yesterday, no labs today. Tac dose increased at that time.   Immunosuppression Management:   CellCept 250 mg BID: Held since 6/27/17 due to neutropenia. Continue to hold.   Tac goal level 5-8. Since MMF held will aim for a goal closer to ~8. Prograf 7 mg BID, increased 7/18. 7/21 Level 3.8, 12 hr trough. Will increase dose to 8mg PO BID  Cardiorespiratory: Intubated for ex lap, initially requiring pressor support. HDS. Moderate right sided pleural effusion. Chest tube placed 7/9 that was removed 7/12. HDS.   -Difficult to have patient do aggressive pulmonary toilet. Albuterol/Mucomyst neb x 4 doses and started acapella. Patient to be OOB to chair as much as possible.   -CXR- pulmonary edema. RR increased. Lasix IV frequently  Hematology: Pancytopenia, acute on chronic, secondary to medications. MMF and bactrim held (since 6/27). Valcyte held on admission. Neupogen 480 mcg given on 7/4 to 7/7,  CMV seronegative and donor seropositive.   -7/3 CMV PCR < 137. 7/10 CMV  7/18 CMV  copies, 7/20 1906 copies. Valcyte ppx dose restarted and discontinued. Started IV Ganciclovir 7/20 due to concern for CMV colitis.  Awaiting colonoscopy to evaluate for CMV colitis. Continue to hold MMF and bactrim.   -Anemia- HGB 6.9 today, stable. DERRELL negative, checked given spherocytes on P smear. Will transfuse one unit of PRBC today and follow with 40mg IV lasix.   -Cytology ascites  fluid, negative for malignancy   GI: Neutropenic colitis.   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 ID recommending colonoscopy, colonsocopy- poor prep, normal mucosa, biopsy obtained, waiting for results  -NJ placed. TF Peptamen titrated at goal, cycled  -C diff negative.  -Start nectar thick full liquids. Aspiration precautions. SLP following.    Fluid/Electrolytes/Renal: EJ, secondary to hypoperfusion, sepsis. Cr 1.7<-1.5 (1.7). BUN 70. Good UO, 2.6 L yesterday. HypoNa, resolved. Hyperkalemia, PTA on florinef- resumed yesterday. K stable. Due to frequent lasix over the last 2 days for hyperkalemia Na is elevated at 150 this AM. Will start D5 @ 100/hour per nephrology and monitor Na. Avoid nephrotoxins.   Endocrine: DM II, on insulin gtt. Endocrine consulted to transition to subQ insulin.  Infectious disease: Started on Zosyn, Vancomycin, Flagyl, Micafungin on admission (7/4/2017). 7/5 Blood cultures, no growth thus far. Abdominal fluid culture collected intraoperatively, gram stain with no organisms, fungal and bacterial culture, GPCs. Bronchoalveolar lavage growing VRE, colonization. Tx ID consulted.  -Fever > 101 for several days, WBC 3.9, stable. 7/13 CT scan negative for abscess, large amount of ascites, mildly prominent lymph nodes-no significant change. 7/15 BAL cx and 7/14 ascites cx unremarkable per ID. Ascitic fluid exudate. Continue ertapenem x 14 days. EBV ,000 copies. Brain MRI negative, less suspicious for PTLD. Immunosuppression remains lowered. CMV viremia as noted above.    -T max 24 hrs- 99.9  -CMV viremia - CMV D+R-, low viral count. IS reduced (MMF held). Started IV Ganciclovir on 07/20 as noted above.   -Possible drug fever. Seroquel stopped due to this  Neuro: Delirium, hold, limit medications that have CNS SE.  Neurology consulted- dx likely toxic metabolic encephalopathy, improving slowly. Video EEG showed mild to moderate encephalopathy. Assist patient with sleep hygiene. Appreciate neurology recs.  In view of improved alertness would defer LP till Monday.   Pain: General discomfort, chronic back pain. Patient very restless. Lidoderm patch for back pain.  Oxycodone 5 mg PRN. Restart PTA flexeril qHS   Vascular: Right Arterial line clot 2/2 previous   art line. Vascular consulted. Recommend ASA only. Some evidence of microvascular injury in digits (toes) this is most likely due to injury while patient was on pressor therapy and sepsis. Now with a third toe injury, vascular consulted again. US completed. Plan for ECHO today.   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   Activity: Daily PT/OT  Prophylaxis: PPI, DVT-SCD  Disposition: 7A.    Medical Decision Making: Medium  Admit [id number] (moderate level decision making)    PILLO/Fellow/Resident Provider: Eric Dacosta  Fellow, Transplant surgery  Pager: [phone]    Faculty: Jovan Tran M.D.  __________________________________________________________________  Transplant History:.  3/4/2017 DD OLT with Dr. Tran (Liver), Postoperative day: 141     Interval History:   Overnight events: No acute events o/n. Patient oriented x1.     ROS:   A 10-point review of systems was negative except as noted above.    Meds:    insulin isophane human  32 Units Subcutaneous QPM     tacrolimus  8 mg Oral BID IS     insulin glargine  20 Units Subcutaneous Q24H     fludrocortisone  100 mcg Oral BID     ganciclovir (CYTOVENE) intermittent infusion  2.5 mg/kg (Dosing Weight) Intravenous Q12H     insulin aspart  1-12 Units Subcutaneous Q4H     aspirin  80 mg Oral Daily     lidocaine  1-2 patch Transdermal Q24h    And     lidocaine   Transdermal Q24H    And     lidocaine   Transdermal Q8H     cyclobenzaprine  10 mg Oral At Bedtime     insulin aspart   Subcutaneous TID w/meals     cloNIDine  0.2 mg Oral BID     amLODIPine  5 mg Oral Daily     multivitamins with minerals  15 mL Per Feeding Tube Daily     heparin  5,000 Units Subcutaneous Q8H     lidocaine  (viscous)  5 mL Topical Once     pantoprazole  40 mg Oral or Feeding Tube Daily     sodium chloride (PF)  3 mL Intracatheter Q8H       Physical Exam:     Admit Weight: 94.2 kg (207 lb 11.2 oz) (SCALE 2)    Current vitals:   /87 (BP Location: Right arm)  Pulse 104  Temp 98.8  F (37.1  C) (Oral)  Resp 22  Ht 1.829 m (6')  Wt 107 kg (235 lb 12.8 oz)  SpO2 95%  BMI 31.98 kg/m2    Vital sign ranges:    Temp:  [98  F (36.7  C)-98.9  F (37.2  C)] 98.8  F (37.1  C)  Pulse:  [] 104  Heart Rate:  [] 104  Resp:  [15-28] 22  BP: (142-165)/(60-88) 165/87  SpO2:  [92 %-97 %] 95 %  Patient Vitals for the past 24 hrs:   BP Temp Temp src Pulse Heart Rate Resp SpO2   07/23/17 0823 165/87 - - 104 104 22 95 %   07/23/17 0733 161/60 98.8  F (37.1  C) Oral 109 109 28 93 %   07/23/17 0404 142/88 98.8  F (37.1  C) Oral - 100 19 94 %   07/23/17 0000 154/83 98.9  F (37.2  C) Oral - 90 15 93 %   07/22/17 2056 151/80 98  F (36.7  C) Oral 91 91 20 92 %   07/22/17 1605 147/81 98  F (36.7  C) Oral 97 97 20 97 %   07/22/17 1158 161/88 98.9  F (37.2  C) Axillary - 100 22 97 %     General Appearance: NAD  Skin: normal, warm, dry  Heart: RRR  Lungs: Non labored on 2-3L NC.  Abdomen: The abdomen is soft, midline incision is c/d/i and covered. Chevron incision well healed.   : vazquez is present, yellow urine in bag.   Extremities: BLE trace edema.  Neurologic: alert, orientated x 1. No tremor.    Data:   CMP    Recent Labs  Lab 07/23/17  0625 07/22/17  0638  07/21/17  0615    147*  < > 150*   POTASSIUM 3.8 3.8  < > 4.9   CHLORIDE 112* 116*  < > 117*   CO2 23 23  < > 25   * 184*  < > 81   BUN 62* 59*  < > 70*   CR 1.76* 1.63*  < > 1.66*   GFRESTIMATED 41* 45*  < > 44*   GFRESTBLACK 49* 54*  < > 53*   JACOB 7.6* 8.1*  < > 8.2*   ALBUMIN 1.9*  --   --  1.9*   BILITOTAL 0.3  --   --  0.5   ALKPHOS 224*  --   --  297*   AST 63*  --   --  75*   ALT 47  --   --  51   < > = values in this interval not  displayed.  CBC    Recent Labs  Lab 07/23/17  0625 07/22/17  0638   HGB 6.9* 7.4*   WBC 4.9 4.5   * 136*     COAGS    Recent Labs  Lab 07/21/17  1230 07/17/17  0630   INR 1.24* 1.19*      Urinalysis  Recent Labs   Lab Test  07/19/17   1150  07/11/17   1105   06/14/17   1508   04/11/16   1345   COLOR  Yellow  Yellow   < >   --    < >  Yellow   APPEARANCE  Slightly Cloudy  Clear   < >   --    < >  Clear   URINEGLC  Negative  Negative   < >   --    < >  30*   URINEBILI  Negative  Negative   < >   --    < >  Negative   URINEKETONE  Negative  Negative   < >   --    < >  Negative   SG  1.009  1.009   < >   --    < >  1.016   UBLD  Moderate*  Negative   < >   --    < >  Small*   URINEPH  5.0  5.0   < >   --    < >  5.0   PROTEIN  10*  10*   < >   --    < >  30*   NITRITE  Negative  Negative   < >   --    < >  Negative   LEUKEST  Negative  Negative   < >   --    < >  Negative   RBCU  6*  <1   < >   --    < >  1   WBCU  2  <1   < >   --    < >  1   UTPG   --    --    --   1.55*   --   0.41*    < > = values in this interval not displayed.       Cultures:   Blood Cultures x2 7/4/2017 NGTD     Abdominal Fluid Culture 7/4/2017: NGTD    Abdominal Fluid Culture 7/5/17: NGTD    Bronchoalveolar Culture 7/5/17: VRE.     Sputum endotracheal gm stain/culture 7/11/17: >25 PMNs/low power field   Few Mixed gram positive elvie    CT scan:    7/4/2017 CONCLUSION:   1. Right colonic wall thickening suggesting colitis. Follow-up is necessary to confirm resolution in order to exclude colonic mass.  2. Transverse colon is not well distended reducing evaluation for wall thickening.  3. Small amount of ascites.  4. Splenomegaly.  5. Prominent portal and splenic veins. If confirmation of vascular patency is indicated consider follow-up ultrasound of the liver with Doppler.  6. Small right pleural effusion.  7. Stranding in the subcutaneous fat of the ventral pelvis.     Abdominal XR 7/4/2017:   1. No evidence of pneumoperitoneum.  2.  Dilated loop of bowel in the central abdomen, likely sigmoid.    XR Chest Portable 1 View 7/4/17:  1. Endotracheal tube tip projects 5.3 cm from the chacorta.  2. Increased bilateral pleural effusions, right greater than left.  3. Unchanged bibasilar patchy opacities.

## 2017-07-23 NOTE — PROGRESS NOTES
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  He has experienced increased hyperglycemia related to acute stress and enteral feedings.      Glucose over 24 hours noted:     Recent Labs  Lab 07/23/17  0731 07/23/17  0625 07/23/17  0450 07/22/17  2356 07/22/17 2047 07/22/17  1600 07/22/17  1148  07/22/17  0638  07/22/17  0313  07/21/17  0615  07/20/17  0645  07/19/17  0621   GLC  --  237*  --   --   --   --   --   --  184*  --  186*  --  81  --  97  --  146*   *  --  225* 212* 139* 178* 169*  < >  --   < >  --   < >  --   < >  --   < >  --    < > = values in this interval not displayed.    Currently on NPH with TF at 28 units and Lantus 20 units in am.  BG higher early am; needs higher NPH dose with the TF. Will increase NPH by 25%.      Plan:   -glargine 20 units q24h at 1600.  -NPH increase to 34 units for Nepro 115 cc/h x 14 h, Order reads HOLD NPH if no TFs  -aspart 1 unit per 10 grams carbohydrate if taking PO  -aspart high correction q4h       Lui Avila MD  Endocrinology Fellow  Pager [phone]    Active Diet Order      Dysphagia Diet Level 2 [facility] Altered Nectar Thickened Liquids (pre-thickened or use instant food thickener)    /87 (BP Location: Right arm)  Pulse 104  Temp 98.8  F (37.1  C) (Oral)  Resp 22  Ht 1.829 m (6')  Wt 107 kg (235 lb 12.8 oz)  SpO2 95%  BMI 31.98 kg/m2    Recent Labs  Lab 07/23/17  0731 07/23/17  0625 07/23/17  0450 07/22/17  2356 07/22/17 2047 07/22/17  1600 07/22/17  1148  07/22/17  0638  07/22/17  0313  07/21/17  0615  07/20/17  0645  07/19/17 0621   GLC  --  237*  --   --   --   --   --   --  184*  --  186*  --  81  --  97  --  146*   [facility]  237*  --  225* 212* 139* 178* 169*  < >  --   < >  --   < >  --   < >  --   < >  --    < > = values in this interval not displayed.  Lab Results   Component Value Date    A1C  07/06/2017     Canceled, Test credited   Below Assay Range  NOTIFIED LEONARD ONEILL AT 0538 ON 7/6/17 BY EAANTOLIN      A1C 9.4 02/16/2017    A1C 6.2 12/14/2016    A1C 6.1 10/27/2016    A1C 8.3 07/02/2012     ATTENDING NOTE   I have reviewed and discussed case with fellow, and agree with the plan of care.    Gisella Espino MD    Division of Diabetes and Endocrinology  Department of Medicine  [phone]

## 2017-07-23 NOTE — PLAN OF CARE
Problem: Goal Outcome Summary  Goal: Goal Outcome Summary  Outcome: Improving  Alert, disoriented to time and intermittently illogical and impulsive but improving.  Attendant at bedside for safety.  Tmax 99.9, HR 's, VSS on 3L NC.  Pulmonary toileting encouraged.  Hgb 6.9; 1unit PRBC transfused.  Na 144, D5 infusion d/c'd.  K 3.8; 20mEq replaced. Sepsis protocol triggered; lactic 1.  Pt hypoglycemic this evening BG 67 (covered for some carbs he didn't end up eating at lunch).  Diet advanced to DD3 with thin liquids; poor appetite.  Oxycodone provided for pain.  Crowe with adequate output.  Incontinent stoolx2.  Incision stapled and open to air.  Pt up to chair with liftx2 and pivoted to the commode with Ax2 and gait belt.  Continue to encourage activity and pulmonary toileting and notify MD with changes.

## 2017-07-24 ENCOUNTER — APPOINTMENT (OUTPATIENT)
Dept: SPEECH THERAPY | Facility: CLINIC | Age: 52
End: 2017-07-24
Attending: TRANSPLANT SURGERY
Payer: COMMERCIAL

## 2017-07-24 ENCOUNTER — APPOINTMENT (OUTPATIENT)
Dept: PHYSICAL THERAPY | Facility: CLINIC | Age: 52
End: 2017-07-24
Attending: TRANSPLANT SURGERY
Payer: COMMERCIAL

## 2017-07-24 LAB
ALBUMIN SERPL-MCNC: 1.7 G/DL (ref 3.4–5)
ALP SERPL-CCNC: 264 U/L (ref 40–150)
ALT SERPL W P-5'-P-CCNC: 54 U/L (ref 0–70)
ANION GAP SERPL CALCULATED.3IONS-SCNC: 6 MMOL/L (ref 3–14)
AST SERPL W P-5'-P-CCNC: 86 U/L (ref 0–45)
BASOPHILS # BLD AUTO: 0 10E9/L (ref 0–0.2)
BASOPHILS NFR BLD AUTO: 0.3 %
BILIRUB SERPL-MCNC: 0.4 MG/DL (ref 0.2–1.3)
BUN SERPL-MCNC: 65 MG/DL (ref 7–30)
CALCIUM SERPL-MCNC: 7.8 MG/DL (ref 8.5–10.1)
CHLORIDE SERPL-SCNC: 112 MMOL/L (ref 94–109)
CO2 SERPL-SCNC: 24 MMOL/L (ref 20–32)
CREAT SERPL-MCNC: 1.88 MG/DL (ref 0.66–1.25)
DIFFERENTIAL METHOD BLD: ABNORMAL
EOSINOPHIL # BLD AUTO: 0.1 10E9/L (ref 0–0.7)
EOSINOPHIL NFR BLD AUTO: 0.9 %
ERYTHROCYTE [DISTWIDTH] IN BLOOD BY AUTOMATED COUNT: 16 % (ref 10–15)
GFR SERPL CREATININE-BSD FRML MDRD: 38 ML/MIN/1.7M2
GLUCOSE BLDC GLUCOMTR-MCNC: 125 MG/DL (ref 70–99)
GLUCOSE BLDC GLUCOMTR-MCNC: 133 MG/DL (ref 70–99)
GLUCOSE BLDC GLUCOMTR-MCNC: 143 MG/DL (ref 70–99)
GLUCOSE BLDC GLUCOMTR-MCNC: 183 MG/DL (ref 70–99)
GLUCOSE BLDC GLUCOMTR-MCNC: 217 MG/DL (ref 70–99)
GLUCOSE BLDC GLUCOMTR-MCNC: 219 MG/DL (ref 70–99)
GLUCOSE SERPL-MCNC: 226 MG/DL (ref 70–99)
HCT VFR BLD AUTO: 24.8 % (ref 40–53)
HGB BLD-MCNC: 7.8 G/DL (ref 13.3–17.7)
IGG SERPL-MCNC: 1660 MG/DL (ref 695–1620)
IMM GRANULOCYTES # BLD: 0 10E9/L (ref 0–0.4)
IMM GRANULOCYTES NFR BLD: 0.3 %
INR PPP: 1.11 (ref 0.86–1.14)
LYMPHOCYTES # BLD AUTO: 2.3 10E9/L (ref 0.8–5.3)
LYMPHOCYTES NFR BLD AUTO: 35.9 %
MCH RBC QN AUTO: 27.8 PG (ref 26.5–33)
MCHC RBC AUTO-ENTMCNC: 31.5 G/DL (ref 31.5–36.5)
MCV RBC AUTO: 88 FL (ref 78–100)
MONOCYTES # BLD AUTO: 0.3 10E9/L (ref 0–1.3)
MONOCYTES NFR BLD AUTO: 4.4 %
NEUTROPHILS # BLD AUTO: 3.7 10E9/L (ref 1.6–8.3)
NEUTROPHILS NFR BLD AUTO: 58.2 %
NRBC # BLD AUTO: 0 10*3/UL
NRBC BLD AUTO-RTO: 0 /100
PLATELET # BLD AUTO: 126 10E9/L (ref 150–450)
POTASSIUM SERPL-SCNC: 3.8 MMOL/L (ref 3.4–5.3)
PREALB SERPL IA-MCNC: 8 MG/DL (ref 15–45)
PROT SERPL-MCNC: 6 G/DL (ref 6.8–8.8)
RBC # BLD AUTO: 2.81 10E12/L (ref 4.4–5.9)
SODIUM SERPL-SCNC: 142 MMOL/L (ref 133–144)
TRIGL SERPL-MCNC: 303 MG/DL
WBC # BLD AUTO: 6.4 10E9/L (ref 4–11)

## 2017-07-24 PROCEDURE — 27210432 ZZH NUTRITION PRODUCT RENAL BASIC LITER

## 2017-07-24 PROCEDURE — 25000128 H RX IP 250 OP 636: Performed by: STUDENT IN AN ORGANIZED HEALTH CARE EDUCATION/TRAINING PROGRAM

## 2017-07-24 PROCEDURE — 82784 ASSAY IGA/IGD/IGG/IGM EACH: CPT | Performed by: PHYSICIAN ASSISTANT

## 2017-07-24 PROCEDURE — 84478 ASSAY OF TRIGLYCERIDES: CPT | Performed by: PHYSICIAN ASSISTANT

## 2017-07-24 PROCEDURE — 85025 COMPLETE CBC W/AUTO DIFF WBC: CPT | Performed by: PHYSICIAN ASSISTANT

## 2017-07-24 PROCEDURE — 87496 CYTOMEG DNA AMP PROBE: CPT | Performed by: PHYSICIAN ASSISTANT

## 2017-07-24 PROCEDURE — 40000225 ZZH STATISTIC SLP WARD VISIT

## 2017-07-24 PROCEDURE — 97530 THERAPEUTIC ACTIVITIES: CPT | Mod: GP | Performed by: REHABILITATION PRACTITIONER

## 2017-07-24 PROCEDURE — 25000131 ZZH RX MED GY IP 250 OP 636 PS 637: Performed by: TRANSPLANT SURGERY

## 2017-07-24 PROCEDURE — 25000132 ZZH RX MED GY IP 250 OP 250 PS 637: Performed by: PHYSICIAN ASSISTANT

## 2017-07-24 PROCEDURE — 25000132 ZZH RX MED GY IP 250 OP 250 PS 637: Performed by: STUDENT IN AN ORGANIZED HEALTH CARE EDUCATION/TRAINING PROGRAM

## 2017-07-24 PROCEDURE — 40000556 ZZH STATISTIC PERIPHERAL IV START W US GUIDANCE

## 2017-07-24 PROCEDURE — 25000132 ZZH RX MED GY IP 250 OP 250 PS 637: Performed by: NURSE PRACTITIONER

## 2017-07-24 PROCEDURE — 80053 COMPREHEN METABOLIC PANEL: CPT | Performed by: PHYSICIAN ASSISTANT

## 2017-07-24 PROCEDURE — 40000193 ZZH STATISTIC PT WARD VISIT: Performed by: REHABILITATION PRACTITIONER

## 2017-07-24 PROCEDURE — 36415 COLL VENOUS BLD VENIPUNCTURE: CPT | Performed by: PHYSICIAN ASSISTANT

## 2017-07-24 PROCEDURE — 12000025 ZZH R&B TRANSPLANT INTERMEDIATE

## 2017-07-24 PROCEDURE — 00000146 ZZHCL STATISTIC GLUCOSE BY METER IP

## 2017-07-24 PROCEDURE — 25000125 ZZHC RX 250: Performed by: PHYSICIAN ASSISTANT

## 2017-07-24 PROCEDURE — 25000128 H RX IP 250 OP 636: Performed by: PHYSICIAN ASSISTANT

## 2017-07-24 PROCEDURE — 25000132 ZZH RX MED GY IP 250 OP 250 PS 637: Performed by: TRANSPLANT SURGERY

## 2017-07-24 PROCEDURE — 25000132 ZZH RX MED GY IP 250 OP 250 PS 637: Performed by: SURGERY

## 2017-07-24 PROCEDURE — 84134 ASSAY OF PREALBUMIN: CPT | Performed by: PHYSICIAN ASSISTANT

## 2017-07-24 PROCEDURE — 92526 ORAL FUNCTION THERAPY: CPT | Mod: GN

## 2017-07-24 PROCEDURE — 85610 PROTHROMBIN TIME: CPT | Performed by: PHYSICIAN ASSISTANT

## 2017-07-24 RX ORDER — FUROSEMIDE 10 MG/ML
20 INJECTION INTRAMUSCULAR; INTRAVENOUS ONCE
Status: COMPLETED | OUTPATIENT
Start: 2017-07-24 | End: 2017-07-24

## 2017-07-24 RX ADMIN — FUROSEMIDE 20 MG: 10 INJECTION, SOLUTION INTRAVENOUS at 15:32

## 2017-07-24 RX ADMIN — HEPARIN SODIUM 5000 UNITS: 5000 INJECTION, SOLUTION INTRAVENOUS; SUBCUTANEOUS at 11:52

## 2017-07-24 RX ADMIN — HEPARIN SODIUM 5000 UNITS: 5000 INJECTION, SOLUTION INTRAVENOUS; SUBCUTANEOUS at 04:12

## 2017-07-24 RX ADMIN — FLUDROCORTISONE ACETATE 100 MCG: 0.1 TABLET ORAL at 20:05

## 2017-07-24 RX ADMIN — GANCICLOVIR SODIUM 250 MG: 500 INJECTION, POWDER, LYOPHILIZED, FOR SOLUTION INTRAVENOUS at 06:14

## 2017-07-24 RX ADMIN — OXYCODONE HYDROCHLORIDE 5 MG: 5 SOLUTION ORAL at 02:54

## 2017-07-24 RX ADMIN — MULTIVIT AND MINERALS-FERROUS GLUCONATE 9 MG IRON/15 ML ORAL LIQUID 15 ML: at 07:43

## 2017-07-24 RX ADMIN — LIDOCAINE 2 PATCH: 50 PATCH CUTANEOUS at 20:18

## 2017-07-24 RX ADMIN — PANTOPRAZOLE SODIUM 40 MG: 40 TABLET, DELAYED RELEASE ORAL at 07:43

## 2017-07-24 RX ADMIN — CYCLOBENZAPRINE HYDROCHLORIDE 10 MG: 10 TABLET, FILM COATED ORAL at 21:47

## 2017-07-24 RX ADMIN — ACETAMINOPHEN 650 MG: 325 TABLET, FILM COATED ORAL at 16:38

## 2017-07-24 RX ADMIN — FLUDROCORTISONE ACETATE 100 MCG: 0.1 TABLET ORAL at 07:43

## 2017-07-24 RX ADMIN — Medication 0.2 MG: at 07:43

## 2017-07-24 RX ADMIN — AMLODIPINE BESYLATE 5 MG: 10 TABLET ORAL at 07:43

## 2017-07-24 RX ADMIN — INSULIN ASPART 1 UNITS: 100 INJECTION, SOLUTION INTRAVENOUS; SUBCUTANEOUS at 11:58

## 2017-07-24 RX ADMIN — HEPARIN SODIUM 5000 UNITS: 5000 INJECTION, SOLUTION INTRAVENOUS; SUBCUTANEOUS at 20:05

## 2017-07-24 RX ADMIN — Medication 0.2 MG: at 20:05

## 2017-07-24 RX ADMIN — TACROLIMUS 8 MG: 5 CAPSULE ORAL at 07:43

## 2017-07-24 RX ADMIN — TACROLIMUS 8 MG: 5 CAPSULE ORAL at 17:56

## 2017-07-24 RX ADMIN — GANCICLOVIR SODIUM 250 MG: 500 INJECTION, POWDER, LYOPHILIZED, FOR SOLUTION INTRAVENOUS at 18:23

## 2017-07-24 RX ADMIN — INSULIN ASPART 1 UNITS: 100 INJECTION, SOLUTION INTRAVENOUS; SUBCUTANEOUS at 17:56

## 2017-07-24 RX ADMIN — Medication 80 MG: at 07:43

## 2017-07-24 NOTE — PLAN OF CARE
Problem: Individualization  Goal: Patient Preferences  Outcome: No Change  T-max: 99.1 (o), HR: 100's, BPs: 150's/80's, on 3L/NC.  B & 217, both covered with s.s.  Abdominal & back pain controlled with Oxycodone x1.  No c/o's nausea.  Crowe with 275cc output, 1 large incontinent loose stool.  Tube feeds at 115cc/hour cycled from 8pm-8am.  Pt. slept little overnight.  Pt. has bedside attendant d/t confusion & impulsiveness.  Pt. disoriented to place & time.  Continue to monitor & treat per POC & notify team with issues.

## 2017-07-24 NOTE — PLAN OF CARE
Problem: Goal Outcome Summary  Goal: Goal Outcome Summary  ST 7A: Recommend cautiously continue dysphagia diet level 3 with thin liquids with supervision/assist. Pt to sit upright for all PO intake, take small bites/sips and alternate consistencies. Anticipate elevated aspiration risk given poor positioning, confusion and impulsivity. ST to continue to follow for PO tolerance and to assist with diet advance as appropriate

## 2017-07-24 NOTE — PROGRESS NOTES
Nutrition Services Brief Note- please see note from 7/18 for full assessment     Diet: DD 3 with thin liquids.   Nutritional supplements: Magic cup with meals    Interventions  1. Ordered calorie counts to assess adequacy of oral intake    Unit RD will continue to monitor per protocol    Miya Villagran MS/RD/LD/CNSC  7A RD Pager: [phone]

## 2017-07-24 NOTE — PLAN OF CARE
Problem: Goal Outcome Summary  Goal: Goal Outcome Summary  Outcome: No Change  Pt slightly tachy with -110s, BP 140s-150s/70s; OVSS on 3L O2.  and 143. Pt c/o abdominal pain but declined pain medication. Pt encouraged to reposition q 2 hours but declined at times and is slightly able to reposition self in chair and bed. Pt on DD3 diet with no appetite per report and cycled tube feeds 8p-8a (finished early d/t starting early yesterday evening). PIV with TKO. NJT for tube feeds and meds. Calorie counts to start at midnight x 3 days. PA request to have team notified if temp > 101.0 to have blood cultures drawn. Crowe in place and no BM this shift. Mother at bedside this afternoon and questions addressed. All care explained and questions answered. Will continue to monitor and will notify team with changes.

## 2017-07-24 NOTE — PROGRESS NOTES
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  He has experienced increased hyperglycemia related to acute stress and enteral feedings.    Mild hypoglycemia last evening, r/t inability to ingest carbs for which aspart had been given.  Still trending high during TF cycle.    -monitor closely for hypoglycemia if regular insulin is bolused for hyperkalemia  -glargine 20 units q24h at 1200.  - increase NPH to 40 units for Nepro 115 cc/h x 12 h, Order reads HOLD NPH if no TFs  -aspart 1 unit per 10 grams carbohydrate if taking PO  -aspart high correction q4h     Outpatient diabetes follow up: Dr. Eckert at [facility]             Interval History:   The last 24 hours progress and nursing notes reviewed.  TF regimen is: Nepro 115 cc/h x 12 hours (note this was ordered for 14 h last week).  Creatinine higher than baseline.  Potassium has not been high for several days now (hyperkalemia was impetus for change from Peptamin to Nepro last wk).  Got 12 hours last night.  NPH was given later than TF start due to low BG earlier.  RN note documents pt did not eat the lunch he received aspart for---> low BG.  Pt reports he did have symptoms low BG and got tired of trying to raise the glucose.  Then starts to describe how he doesn't like the treatment plan for his diabetes in general.  He states he was taking linagliptin 5 mg and glargine 50 units plus insulin for carb intake at home prior to admission-- uncertain accuracy of this report, though mental status is better than end of last week.      Recent Labs  Lab 07/24/17  1153 07/24/17  0745 07/24/17  0705 07/24/17  0416  07/23/17  2359 07/23/17  2051 07/23/17  1857  07/23/17  0625  07/22/17  0638  07/22/17  0313  07/21/17  0615  07/20/17  0645   GLC  --   --  226*  --   --   --   --   --  237*  --  184*  --  186*  --  81  --  97   * 219*  --  217* 183* 129* 103*  < >  --   < >  --   < >  --   < >  --   < >  --    < > = values in this interval not displayed.            Review of Systems:   See interval hx          Medications:   PTA taking Januvia and glargine versus glargine and aspart per carb    Active Diet Order      Dysphagia Diet Level 3 Advanced Thin Liquids (water, ice chips, juice, milk gelatin, ice cream, etc)     Physical Exam:  Gen:  resting in recliner, in NAD   HEENT: NC/AT, mucous membranes are moist, NJ feeding tube  Resp: Unlabored  Neuro: alert, answers more appropriately, but timing and details of diabetes regimens may be inaccurate  /75 (BP Location: Right arm)  Pulse 109  Temp 98.2  F (36.8  C) (Axillary)  Resp 20  Ht 1.829 m (6')  Wt 106.9 kg (235 lb 11.2 oz)  SpO2 92%  BMI 31.97 kg/m2           Data:     Lab Results   Component Value Date    A1C  07/06/2017     Canceled, Test credited   Below Assay Range  NOTIFIED LEONARD ONEILL AT 0538 ON 7/6/17 BY CHRISSY      A1C 9.4 02/16/2017    A1C 6.2 12/14/2016    A1C 6.1 10/27/2016    A1C 8.3 07/02/2012            Recent Labs   Lab Test  07/24/17   0705  07/23/17   0625   NA  142  144   POTASSIUM  3.8  3.8   CHLORIDE  112*  112*   CO2  24  23   ANIONGAP  6  9   GLC  226*  237*   BUN  65*  62*   CR  1.88*  1.76*   JACOB  7.8*  7.6*     CBC RESULTS:   Recent Labs   Lab Test  07/24/17   0705   WBC  6.4   RBC  2.81*   HGB  7.8*   HCT  24.8*   MCV  88   MCH  27.8   MCHC  31.5   RDW  16.0*   PLT  126*     Jaince Guzman APRN [facility] [phone]    Diabetes Management job code [id number]

## 2017-07-24 NOTE — PROGRESS NOTES
Immunosuppression Note:    Camacho Bhagat is a 51 year old male who is seen today  for immunosuppression management     I, Jovan Tran MD, I have examined the patient with the resident/PA/Fellow, discussed and agree with the note and findings.  I have reviewed today's vital signs, medications, labs and imaging. I reviewed the immunosuppression medications and levels. 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     Graft Function: Good function. US liver normal doppler. Slight increase in Alk phos and AST.   Immunosuppression Management:   CellCept 250 mg BID: Held since 6/27/17 due to neutropenia. Continue to hold.   Tac goal level 5-8. Since MMF held will aim for a goal closer to ~8. Prograf 8 mg BID, increased 7/23. 7/23 Level 4.8. Subtherapeutic despite frequent dose increases.   Cardiorespiratory: Intubated for ex lap, initially requiring pressor support. HDS. Moderate right sided pleural effusion. Chest tube placed 7/9 that was removed 7/12. HDS. 7/21 ECHO EF 60-65%, RV dilation.   -O2 sat mid 80s on RA. Hypervolemic with net positive x several days and weight increased, general edema. Will give lasix 20  Mg IV x 1 today.   -Assist patient with pulmonary toilet. Patient to be OOB to chair as much as possible.   Hematology: Pancytopenia, acute on chronic, secondary to medications. MMF and bactrim held (since 6/27). Valcyte held on admission. Neupogen 480 mcg given on 7/4 to 7/7,  CMV seronegative and donor  seropositive.   -7/3 CMV PCR < 137. 7/10 CMV  7/18 CMV  copies, 7/20 1906 copies. Valcyte ppx dose restarted and discontinued. Started IV Ganciclovir 7/20 due to concern for CMV colitis, poor absorption (low tac level).  Awaiting colonoscopy to evaluate for CMV colitis. Continue to hold MMF and bactrim.   -Anemia- HGB 7.8. Blood transfusion 1 unit 7/18.   -Cytology ascites fluid, negative for malignancy   GI: Neutropenic colitis.   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 ID recommending colonoscopy, colonsocopy- poor prep, normal mucosa, biopsy obtained, waiting for results  -NJ placed. TF Peptamen titrated at goal, cycled  -C diff negative.  -DD3 diet, thin liquids. Aspiration precautions. SLP following.    Fluid/Electrolytes/Renal: EJ, secondary to hypoperfusion, sepsis. Cr 1,9. BUN 65. UO, 1.3 L yesterday. HypoNa, resolved. Hyperkalemia, PTA on florinef, continue. K 3.8. TF changed to Nephro. Avoid nephrotoxins.   Endocrine: DM II, on insulin gtt. Endocrine consulted to transition to subQ insulin. Lantus q24 hrs, NPH qHS with TF, CC and SSI.  Infectious disease: Started on Zosyn, Vancomycin, Flagyl, Micafungin on admission (7/4/2017). 7/5 Blood cultures, no growth thus far. Abdominal fluid culture collected intraoperatively, gram stain with no organisms, fungal and bacterial culture, GPCs. Bronchoalveolar lavage growing VRE, colonization. Tx ID consulted.  -7/13 CT scan negative for abscess, large amount of ascites, mildly prominent lymph nodes-no significant change. 7/15 BAL cx and 7/14 ascites cx unremarkable per ID. Ascitic fluid exudate. Continue ertapenem x 14 days. EBV ,000 copies. Brain MRI negative, less suspicious for PTLD. Immunosuppression remains lowered. CMV viremia as noted above.    -T max 24 hrs- 100.5. Increase pulmonary toilet. Repeat BC if temp > 101. CXR if fever > 101.  Would check UA/UC if vazquez changed or removed.   -CMV viremia - CMV D+R-, low viral count. IS reduced (MMF held).  Started IV Ganciclovir on 07/20 as noted above.   Neuro: Delirium, hold, limit medications that have CNS SE.  Neurology consulted- dx likely toxic metabolic encephalopathy, improving slowly. Video EEG showed mild to moderate encephalopathy. Assist patient with sleep hygiene. Appreciate neurology recs. In view of improved alertness Neurology recommending no LP at this time..   Pain: General discomfort, chronic back pain. Patient very restless. Lidoderm patch for back pain.  Oxycodone 5 mg PRN. Restart PTA flexeril qHS   Vascular: Right Arterial line clot 2/2 previous   art line. Vascular consulted. Recommend ASA only. Some evidence of microvascular injury in digits (toes) this is most likely due to injury while patient was on pressor therapy and sepsis. Now with a third toe injury, vascular consulted again. US completed.  ECHO EF 60-65%.   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   Activity: Daily PT/OT  Prophylaxis: PPI, DVT-SCD  Disposition: 7A.    Medical Decision Making: Medium  Admit 03636 (moderate level decision making)    PILLO/Fellow/Resident Provider: DAYO Pino [id number]    Faculty: Jovan Tran M.D.  __________________________________________________________________  Transplant History:.  3/4/2017 DD OLT with Dr. Tran (Liver), Postoperative day: 142     Interval History:   Overnight events: Oriented x 3. Some impulsive behavior. +BM x 1.     ROS:   A 10-point review of systems was negative except as noted above.    Meds:    tacrolimus  8 mg Oral BID IS     insulin isophane human  34 Units Subcutaneous QPM     insulin glargine  20 Units Subcutaneous Q24H     fludrocortisone  100 mcg Oral BID     ganciclovir (CYTOVENE) intermittent infusion  2.5 mg/kg (Dosing Weight) Intravenous Q12H     insulin aspart  1-12 Units Subcutaneous Q4H     aspirin  80 mg Oral Daily     lidocaine  1-2 patch Transdermal Q24h    And     lidocaine   Transdermal Q24H    And     lidocaine    Transdermal Q8H     cyclobenzaprine  10 mg Oral At Bedtime     insulin aspart   Subcutaneous TID w/meals     cloNIDine  0.2 mg Oral BID     amLODIPine  5 mg Oral Daily     multivitamins with minerals  15 mL Per Feeding Tube Daily     heparin  5,000 Units Subcutaneous Q8H     lidocaine (viscous)  5 mL Topical Once     pantoprazole  40 mg Oral or Feeding Tube Daily     sodium chloride (PF)  3 mL Intracatheter Q8H       Physical Exam:     Admit Weight: 94.2 kg (207 lb 11.2 oz) (SCALE 2)    Current vitals:   /75 (BP Location: Right arm)  Pulse 109  Temp 98.2  F (36.8  C) (Axillary)  Resp 20  Ht 1.829 m (6')  Wt 106.9 kg (235 lb 11.2 oz)  SpO2 92%  BMI 31.97 kg/m2    Vital sign ranges:    Temp:  [98.2  F (36.8  C)-100.5  F (38.1  C)] 98.2  F (36.8  C)  Pulse:  [] 109  Heart Rate:  [] 102  Resp:  [19-22] 20  BP: (142-162)/(75-85) 152/75  SpO2:  [92 %-95 %] 92 %  Patient Vitals for the past 24 hrs:   BP Temp Temp src Pulse Heart Rate Resp SpO2 Weight   07/24/17 1207 152/75 98.2  F (36.8  C) Axillary - 102 20 92 % -   07/24/17 0729 142/79 98.9  F (37.2  C) Oral - 111 20 95 % -   07/24/17 0416 151/83 99.1  F (37.3  C) Oral - 101 19 94 % -   07/24/17 0254 - - - - - - - 106.9 kg (235 lb 11.2 oz)   07/24/17 0001 155/80 99  F (37.2  C) Oral - 100 22 93 % -   07/23/17 2053 162/85 100.5  F (38.1  C) Oral 109 109 22 94 % -   07/23/17 1535 154/81 99  F (37.2  C) Oral 96 96 20 95 % -     General Appearance: NAD  Skin: normal, warm, dry  Heart: RRR  Lungs: BCTA, Non labored on 3L NC. +Productive cough  Abdomen: The abdomen is soft, midline incision is c/d/i and covered. Chevron incision well healed.   : vazquez is present, yellow urine in bag.   Extremities: BLE edema.  Neurologic: alert, orientated x 1. No tremor.    Data:   CMP    Recent Labs  Lab 07/24/17  0705 07/23/17  0625    144   POTASSIUM 3.8 3.8   CHLORIDE 112* 112*   CO2 24 23   * 237*   BUN 65* 62*   CR 1.88* 1.76*   GFRESTIMATED 38*  41*   GFRESTBLACK 46* 49*   JACOB 7.8* 7.6*   ALBUMIN 1.7* 1.9*   BILITOTAL 0.4 0.3   ALKPHOS 264* 224*   AST 86* 63*   ALT 54 47     CBC    Recent Labs  Lab 07/24/17  0705 07/23/17  0625   HGB 7.8* 6.9*   WBC 6.4 4.9   * 120*     COAGS    Recent Labs  Lab 07/24/17  0705 07/21/17  1230   INR 1.11 1.24*      Urinalysis  Recent Labs   Lab Test  07/19/17   1150  07/11/17   1105   06/14/17   1508   04/11/16   1345   COLOR  Yellow  Yellow   < >   --    < >  Yellow   APPEARANCE  Slightly Cloudy  Clear   < >   --    < >  Clear   URINEGLC  Negative  Negative   < >   --    < >  30*   URINEBILI  Negative  Negative   < >   --    < >  Negative   URINEKETONE  Negative  Negative   < >   --    < >  Negative   SG  1.009  1.009   < >   --    < >  1.016   UBLD  Moderate*  Negative   < >   --    < >  Small*   URINEPH  5.0  5.0   < >   --    < >  5.0   PROTEIN  10*  10*   < >   --    < >  30*   NITRITE  Negative  Negative   < >   --    < >  Negative   LEUKEST  Negative  Negative   < >   --    < >  Negative   RBCU  6*  <1   < >   --    < >  1   WBCU  2  <1   < >   --    < >  1   UTPG   --    --    --   1.55*   --   0.41*    < > = values in this interval not displayed.       Cultures:   Blood Cultures x2 7/4/2017 NGTD     Abdominal Fluid Culture 7/4/2017: NGTD    Abdominal Fluid Culture 7/5/17: NGTD    Bronchoalveolar Culture 7/5/17: VRE.     Sputum endotracheal gm stain/culture 7/11/17: >25 PMNs/low power field   Few Mixed gram positive elvie    CT scan:    7/4/2017 CONCLUSION:   1. Right colonic wall thickening suggesting colitis. Follow-up is necessary to confirm resolution in order to exclude colonic mass.  2. Transverse colon is not well distended reducing evaluation for wall thickening.  3. Small amount of ascites.  4. Splenomegaly.  5. Prominent portal and splenic veins. If confirmation of vascular patency is indicated consider follow-up ultrasound of the liver with Doppler.  6. Small right pleural effusion.  7. Stranding in  the subcutaneous fat of the ventral pelvis.     Abdominal XR 7/4/2017:   1. No evidence of pneumoperitoneum.  2. Dilated loop of bowel in the central abdomen, likely sigmoid.    XR Chest Portable 1 View 7/4/17:  1. Endotracheal tube tip projects 5.3 cm from the chacorta.  2. Increased bilateral pleural effusions, right greater than left.  3. Unchanged bibasilar patchy opacities.

## 2017-07-24 NOTE — PROGRESS NOTES
[facility]    Transplant Infectious Diseases Inpatient Progress note      Camacho Bhagat MRN# [id number]   YOB: 1965 Age: 51 year old   Date of Admission: 7/4/2017  4:45 AM  Transplant: 3/4/2017 (Liver), Postoperative day: 142            Recommendations:   1. Continue GCV IV adjusted to kidney function at 2.5 mg/kg q12 hr.   2. Repeat CMV PCR 7/27/2017.    3. EBV PCR every other week.   4. With temp of 100.4 or higher please check  - blood cx x2 peripherally.   - UA and Ucx from new vazquez catheter.   - repeat CT chest/abdomen/pelvis to rule out occult abscess/colitis/LA/empyema/consolidation  5. Please check IgG level.         Summary of Presentation:   Transplants:  3/4/2017 (Liver), Postoperative day:  142     This patient is a 51 year old male with CASTAÑEDA s/p OLT in 3/2017. Basilixima for induction. Now on TAC, MMF was held due to neutropenia and multiple infections.   Presented with colitis, s/p exploratory laparotomy.        Active Problems and Infectious Diseases Issues:   1. New intermittent low grade fever.   The etiology of the new fever is not clear but the differential diagnosis includes CMV infection with or without colitis, transient translocation of colonic bacteria due to colitis, EBV-related disease, UTI, other...    2. Neutropenic vs CMV vs PTLD colitis (without ischemia per exp lap)   3. CMV primary infection (donor derived)  4. EBV primary infection (donor derived)  Finished 14 days of ertapenem on 7/18/2017 for colitis thought to be due to neutropenia. The neutropenia was attributed to drugs (MMF/bactrim/VGCV, all were DCed prior to presentation).   The ascitic fluid analysis is c/w exudate but cx negative to date. That could have been due to inflamed bowels.     The primary CMV infection is donor derived following discontinuation of VGCV and in part due to acute illness as well.   Whether the patient also has CMV colitis is not clear but now the patient  needs to be treated for primary CMV infection anyway due to CMV viral load above the threshold of 1500 IU/mL.   IV GCV was initiated 7/20/2017. We should continue to follow weekly CMV PCR. The treatment with either GCV or VGCV should be continued for minimum of 2 weeks.   VGCV ppx was DCed in the past prior to admission due to neutropenia, then resumed briefly from 7/15/2017 till 7/19/2017. So we have to be vigilant for potential CMV resistance in the patient.   The CMV in the BAL is of no clinical significance.     Colonoscopy and biopsy should also help ruling out GI PTLD (was with poor prep). Continue to monitor EBV PCR every other week.   The CT of abdomen did show LA but that could have resulted from the colitis.   Ascitic fluid cytology and leukemia/lymphoma studies negative.   MRI of brain not suggestive of CNS PTLD.     5. Encephalopathy.   Likely due to sepsis.   Seems to be improving.   With negative MRI of brain for lesions.     6. 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    These are likely all colonizers for contaminants.     7. Staphylococcus capitis in ascitic fluid 7/5/2017   Contamination.     8. Rhinovirus in BAL 7/15/2017  Likely to be due to chronic shedding since the patient's main presentation was mostly abdominal not respiratory.      Other Infectious Disease issues include  - PCP prophylaxis: bactrim DCed in June of 2017 due to neutropenia.   - Serostatus: CMV D+/R-, EBV D+/R-, HSV1?/2?, VZV ?  - Immunization status: up-to-date  - Gamma globulin status: not recently checked.       Discussed with primary team.   Attestation:  I interviewed the patient and obtained history from the patient, and by reviewing the patient's chart including outside records, microbiological data, and radiological data. All data are summarized in this notes.  Naseem Figueroa   Pager: [phone]  07/24/2017        Interim  History:  Patient is alert, answers questions appropriately.  Oriented to self and place, he knew the year but not the month or the season.     Underwent colonoscopy but with poor prep, it was poor study.      HPI:  In summary:  CASTAÑEDA s/p OLT in 3/2017.   Presented with abdominal pain and underwent exploratory laparotomy which was not c/w with ischemia. It was felt to be related to neutropenia.   The patient has neutropenia since June of 2017 attributed to immunosuppressive therapy/bactrim/VGCV.   Bactrim DCed June of 2017 and VGCV was DCed at unknown tome.     The patient was started on broad spectrum ABx with persistent fever.   Bronchoscopy done 7/12/2017 grew single colony of VRE which was considered a colonizer.   Ascitic fluid checked on 7/5/2017 and grew S capitis considered a contaminant.   Repeat BAL on 7/15/2017 and repeat paracentesis on 7/7/14/2017 are so far unremarkable for growth but the ascitic fluid is an exudate.     The course was complicated by thrombosis of the right radial artery with ischemia to to the tip of the right index finger.   He also has ischemia to the right hallux and second toe.       ROS:  As mentioned in the interim history otherwise negative by reviewing central and peripheral neurological systems, respiratory system, cardiac system, GI system,  system, musculoskeletal, skin, allergy, and lymphatics.                  Pysical Examination:  Temp: 98.9  F (37.2  C) Temp src: Oral BP: 142/79 Pulse: 109 Heart Rate: 111 Resp: 20 SpO2: 95 % O2 Device: Nasal cannula Oxygen Delivery: 3 LPM  Vitals:    07/15/17 1200 07/16/17 0600 07/17/17 0600 07/19/17 0936   Weight: 92.9 kg (204 lb 12.9 oz) 93.5 kg (206 lb 2.1 oz) 93.2 kg (205 lb 7.5 oz) 107 kg (235 lb 12.8 oz)    07/24/17 0254   Weight: 106.9 kg (235 lb 11.2 oz)     Constitutional: awake, his conversation is more coherent.    CVS: tachycardic, normal S1/S2, no murmur.   Lungs: coarse BS bilaterally anteriorly.   Abdomen: generalized  tenderness, distended, no masses, no bruit, normal BS.   Extremities: +2-3 pitting edema of bilateral lower extremities, ischemic right hallux and right second toe, also ischemic tip of left second toe, and right index, no ulcers, normal ROM of all joints, no swelling or erythema of any of joints and no tenderness to palpation.   Skin: as the exam of the extremities, no induration, fluctuation or discharge,and no rash       Medications:  Medications that Require Transfusion:     NaCl 10 mL/hr at 07/23/17 1902     IV fluid REPLACEMENT ONLY 25 mL/hr at 07/20/17 0430   Scheduled Medications:     tacrolimus  8 mg Oral BID IS     insulin isophane human  34 Units Subcutaneous QPM     insulin glargine  20 Units Subcutaneous Q24H     fludrocortisone  100 mcg Oral BID     ganciclovir (CYTOVENE) intermittent infusion  2.5 mg/kg (Dosing Weight) Intravenous Q12H     insulin aspart  1-12 Units Subcutaneous Q4H     aspirin  80 mg Oral Daily     lidocaine  1-2 patch Transdermal Q24h    And     lidocaine   Transdermal Q24H    And     lidocaine   Transdermal Q8H     cyclobenzaprine  10 mg Oral At Bedtime     insulin aspart   Subcutaneous TID w/meals     cloNIDine  0.2 mg Oral BID     amLODIPine  5 mg Oral Daily     multivitamins with minerals  15 mL Per Feeding Tube Daily     heparin  5,000 Units Subcutaneous Q8H     lidocaine (viscous)  5 mL Topical Once     pantoprazole  40 mg Oral or Feeding Tube Daily     sodium chloride (PF)  3 mL Intracatheter Q8H       Laboratory Data:   No results found for: ACD4    Inflammatory Markers    Recent Labs   Lab Test  07/05/17   1810  03/05/17   0358  11/23/16   0813  11/16/16   0706  11/15/16   1039  11/07/16   0759  11/03/16   0949  11/01/16   0853   08/15/11   1151  04/11/11   1209  01/03/11   1246  02/15/10   1232   SED   --    --   45*   --    --    --    --    --    --   11  14  17*  25*   CRP  354.0  20.0*  58.0*  59.1*  49.8*  66.0*  140.0*  150.0*   < >  11.1*  9.0*  11.7*  17.5*    < >  = values in this interval not displayed.       Immune Globulin Studies     Recent Labs   Lab Test  08/15/11   1243   IGG  1160   IGG1  734   IGG2  294   IGG3  7*   IGG4  59       Metabolic Studies       Recent Labs   Lab Test  07/24/17   0705  07/23/17   0750  07/23/17   0625  07/22/17   0638  07/22/17   0313  07/21/17   1650  07/21/17   0615  07/20/17   2349   07/20/17   0645   07/16/17   0326   07/14/17   0349   07/06/17   0316   NA  142   --   144  147*  149*  149*  150*   --    --   146*   < >  148*   < >  144   < >  143   POTASSIUM  3.8   --   3.8  3.8  4.1   --   4.9  5.0   < >  5.5*   < >  5.1   < >  4.4   < >  5.0   CHLORIDE  112*   --   112*  116*  118*   --   117*   --    --   116*   < >  123*   < >  116*   < >  116*   CO2  24   --   23  23  26   --   25   --    --   25   < >  22   < >  20   < >  18*   ANIONGAP  6   --   9  7  4   --   8   --    --   5   < >  3   < >  8   < >  9   BUN  65*   --   62*  59*  58*   --   70*   --    --   70*   < >  71*   < >  84*   < >  62*   CR  1.88*   --   1.76*  1.63*  1.64*   --   1.66*   --    --   1.67*   < >  1.66*   < >  1.74*   < >  2.75*   GFRESTIMATED  38*   --   41*  45*  44*   --   44*   --    --   43*   < >  44*   < >  41*   < >  24*   GLC  226*   --   237*  184*  186*   --   81   --    --   97   < >  128*   < >  187*   < >  139*   A1C   --    --    --    --    --    --    --    --    --    --    --    --    --    --    --   Canceled, Test credited   Below Assay Range  NOTIFIED LEONARD ONEILL AT 0538 ON 7/6/17 BY CHRISSY     JACOB  7.8*   --   7.6*  8.1*  7.8*   --   8.2*   --    --   8.1*   < >  7.8*   < >  8.0*   < >  6.9*   PHOS   --    --    --    --    --    --    --    --    --    --    --   3.8   --   3.7   < >  5.5*   MAG   --    --    --    --    --    --    --    --    --    --    --   2.4*   --   2.2   < >  2.1   LACT   --   1.0   --    --    --   0.7   --    --    --    --    < >   --    --    --    < >  1.5    < > = values in this interval not displayed.        Hepatic Studies    Recent Labs   Lab Test  07/24/17   0705  07/23/17   0625  07/21/17   0615  07/19/17   0621  07/18/17   0624  07/17/17   0630   07/11/17   0328   07/05/17   1810   BILITOTAL  0.4  0.3  0.5  0.7  0.6  1.0   < >  0.5   < >   --    ALKPHOS  264*  224*  297*  201*  171*  144   < >  158*   < >   --    ALBUMIN  1.7*  1.9*  1.9*  2.0*  1.9*  1.9*   < >  1.8*   < >   --    AST  86*  63*  75*  60*  88*  101*   < >  34   < >   --    ALT  54  47  51  50  61  58   < >  27   < >   --    LDH   --    --    --    --    --    --    --    --    --   289*   GGT   --    --    --    --    --    --    --   58   --    --     < > = values in this interval not displayed.       Pancreatitis testing    Recent Labs   Lab Test  07/24/17   0705  07/17/17   0630  07/12/17   0342  07/10/17   0340  07/05/17   0346  03/05/17   0358  03/03/17   1458  02/21/17   1520  12/09/16   1159  02/08/16   1144   09/30/10   1734   AMYLASE   --    --    --    --    --   53  70   --    --    --    --   82   LIPASE   --    --    --    --    --   216   --   326  161   --    --   138   TRIG  303*  168*  114  177*  174*   --    --    --    --   99   < >   --     < > = values in this interval not displayed.       Hematology Studies      Recent Labs   Lab Test  07/24/17   0705  07/23/17   0625  07/22/17   0638  07/21/17   0615  07/20/17   0645  07/19/17   0621   WBC  6.4  4.9  4.5  3.9*  4.6  5.6   ANEU  3.7  2.3  2.3  2.2  2.7  3.8   ALYM  2.3  2.1  1.8  1.2  1.5  1.3   ZINA  0.3  0.3  0.4  0.3  0.4  0.3   AEOS  0.1  0.1  0.1  0.1  0.1  0.1   HGB  7.8*  6.9*  7.4*  7.0*  7.4*  7.8*   HCT  24.8*  22.6*  24.1*  22.8*  23.4*  25.0*   PLT  126*  120*  136*  118*  127*  145*       Arterial Blood Gas Testing    Recent Labs   Lab Test  07/08/17   0902  07/06/17   2334  07/06/17   0015  07/05/17   2026  07/05/17   1810   PH  7.32*  7.26*  7.27*  7.27*  7.28*   PCO2  35  37  35  35  37   PO2  96  85  109*  155*  188*   HCO3  18*  17*  16*  16*  17*    O2PER  30  30.0  30.0  40  50.0        Urine Studies     Recent Labs   Lab Test  07/19/17   1150  07/11/17   1105  07/06/17   1441  06/06/17   1605  03/24/17   1315   URINEPH  5.0  5.0  5.0  5.0  5.5   NITRITE  Negative  Negative  Negative  Negative  Negative   LEUKEST  Negative  Negative  Trace*  Negative  Negative   WBCU  2  <1  9*  1  1       Vancomycin Levels     Recent Labs   Lab Test  07/06/17   0316  10/28/16   1405   VANCOMYCIN  22.1  14.4       Tacrolimus levels    Invalid input(s): TACROLIMUS, TAC, TACR  Transplant Immunosuppression Labs Latest Ref Rng & Units 7/24/2017 7/23/2017 7/22/2017 7/22/2017 7/22/2017   Tacro Level 5.0 - 15.0 ug/L - 4.8(L) 3.8(L) - -   Tacro Level - - Not Provided Not Provided - -   Creat 0.66 - 1.25 mg/dL 1.88(H) 1.76(H) - 1.63(H) 1.64(H)   BUN 7 - 30 mg/dL 65(H) 62(H) - 59(H) 58(H)   WBC 4.0 - 11.0 10e9/L 6.4 4.9 - 4.5 -   Neutrophil % 58.2 47.9 - 50.3 -   ANEU 1.6 - 8.3 10e9/L 3.7 2.3 - 2.3 -       CSF testing     Recent Labs   Lab Test  06/11/09   0225   CWBC  1   CRBC  2   CGLU  104*   CTP  54         Microbiology:  Ascites  7/1714/2017 negative to date.     7/5/2017 S acpitis    BAL   7/15/2017 yeast and Rhinovirus     7/12/2017 single colony of VRE, C albicans/dubliniensis   Sputum  7/11/2017 VRE and Coag Neg Staph     Bcx   7/15/2017 negative to date.     Last check of C difficile  C Diff Toxin B PCR   Date Value Ref Range Status   10/27/2016  NEG Final    Negative  Negative: Clostridium difficile target DNA sequences NOT detected, presumed   negative for Clostridium difficile toxin B or the number of bacteria present   may be below the limit of detection for the test.   FDA approved assay performed using [employer] real-time PCR.   A negative result does not exclude actual disease due to Clostridium difficile   and may be due to improper collection, handling and storage of the specimen or   the number of organisms in the specimen is below the detection limit of  the   assay.         Virology:  CMV viral loads    Recent Labs   Lab Test  07/20/17   1427  07/18/17   0530  07/15/17   1553  07/10/17   0340  07/03/17   1032   CSPEC  Plasma  Plasma  Bronchoalveolar Lavage  EDTA PLASMA  Plasma, EDTA anticoagulant   CMVLOG  3.3*  3.0*  3.6*  2.2*  <2.1       CMV viral loads    Log IU/mL of CMVQNT   Date Value Ref Range Status   07/20/2017 3.3 (H) <2.1 [Log_IU]/mL Final   07/18/2017 3.0 (H) <2.1 [Log_IU]/mL Final   07/15/2017 3.6 (H) <2.1 [Log_IU]/mL Final   07/10/2017 2.2 (H) <2.1 [Log_IU]/mL Final   07/03/2017 <2.1 <2.1 [Log_IU]/mL Final   06/26/2017 Not Calculated <2.1 [Log_IU]/mL Final       CMV resistance testing  No lab results found.  No results found for: CMVCID, CMVFOS, CMVGAN    USCNS Invalid input(s): USCN, USCNS    USCNS No components found for: USCN, USCNS      No results found for: H6RES    EBV DNA Copies/mL   Date Value Ref Range Status   07/18/2017 [id number] (A) EBVNEG [Copies]/mL Final         Pathology   Cytology of ascitic fluid 7/14/2017.   Peritoneal fluid, ascites:   - Negative for malignancy   - Acute and chronic inflammation present   Specimen Adequacy: Satisfactory for evaluation.   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   INTERPRETATION:   Ascites fluid:        Rare to absent viable B cells     COMMENT:   This specimen was collected on 7/14/17 but was not ordered/delivered to   lab/run until 7/18/17 - results should be interpreted with caution due   to low cellularity/viability.     Due to limited cellularity we were only able to analyze the B cells.   There is no immunophenotypic evidence of B cell non-Hodgkin lymphoma.   Neoplastic cells, including large cells, may not survive specimen   processing. This sample may not be representative. Final interpretation   requires correlation with morphologic and clinical features.       Imaging:  MRI brain 7/20/2017.   Impression:  1. Significant image degradation due to motion artifact, with no  definite acute  intracranial pathology. No abnormal contrast enhancing  intracranial lesions.  2. Mucosal thickening in the sphenoid and maxillary sinuses and left  greater than right mastoid fluid are nonspecific in the setting of  recent intubation.    CT c/a/p 7/13/2017 Reviewed by myself.   IMPRESSION:   1. Improved moderate right-sided pleural effusion and resolution of  left-sided pleural effusion. There remain large areas of  atelectasis/consolidation in both lungs, including in the left lower  lobe despite resolution of left-sided pleural effusion. Superimposed  infectious process cannot be excluded.  2. Moderate-large amount of ascites increased from 7/8/2017, which  makes it difficult to evaluate for the presence or absence of  inflammatory change or infectious process in the abdomen or pelvis. No  intra-abdominal abscess.  3. Stable small presumed hematoma within the ventral abdominal wall  fat.  4. Splenomegaly.        Naseem Figueroa  Pager: [phone]

## 2017-07-24 NOTE — PLAN OF CARE
Problem: Goal Outcome Summary  Goal: Goal Outcome Summary  PT - per plan established by the Physical Therapist, according to functional mobility the  discharge recommendation is TCU for cont skilled PT to progress functional mobility. Pt limited by general weakness and low back pain. Pt needing mod A x 2 for log roll supine to sit, min A x 2 for sit to stand with WW. Pt needing simple one step directions while standing to transfer to recliner. Pt up in chair with 1:1 in room.   PT to change plan of care to daily.

## 2017-07-24 NOTE — PLAN OF CARE
Problem: Goal Outcome Summary  Goal: Goal Outcome Summary  Outcome: No Change  Pt has HTN, tachycardia, and T max of 100.4. . Pt complained of some pain and has lidocaine patches on abd and received 5mg of oxy. Pt has some slight increase of confusion, and disoriented to time. Pt has a bedside attendant. Sat in chair earlier and now back laying in bed. Incision is clean and stapled close with no drainage. Crowe putting out adequate amounts of urine. Will continue to monitor and follow plan of care.

## 2017-07-25 ENCOUNTER — APPOINTMENT (OUTPATIENT)
Dept: GENERAL RADIOLOGY | Facility: CLINIC | Age: 52
End: 2017-07-25
Attending: PHYSICIAN ASSISTANT
Payer: COMMERCIAL

## 2017-07-25 ENCOUNTER — APPOINTMENT (OUTPATIENT)
Dept: GENERAL RADIOLOGY | Facility: CLINIC | Age: 52
End: 2017-07-25
Attending: TRANSPLANT SURGERY
Payer: COMMERCIAL

## 2017-07-25 ENCOUNTER — APPOINTMENT (OUTPATIENT)
Dept: ULTRASOUND IMAGING | Facility: CLINIC | Age: 52
End: 2017-07-25
Attending: SURGERY
Payer: COMMERCIAL

## 2017-07-25 ENCOUNTER — APPOINTMENT (OUTPATIENT)
Dept: CT IMAGING | Facility: CLINIC | Age: 52
End: 2017-07-25
Attending: PHYSICIAN ASSISTANT
Payer: COMMERCIAL

## 2017-07-25 ENCOUNTER — APPOINTMENT (OUTPATIENT)
Dept: PHYSICAL THERAPY | Facility: CLINIC | Age: 52
End: 2017-07-25
Attending: TRANSPLANT SURGERY
Payer: COMMERCIAL

## 2017-07-25 ENCOUNTER — ANESTHESIA EVENT (OUTPATIENT)
Dept: INTENSIVE CARE | Facility: CLINIC | Age: 52
End: 2017-07-25
Payer: COMMERCIAL

## 2017-07-25 ENCOUNTER — ANESTHESIA (OUTPATIENT)
Dept: INTENSIVE CARE | Facility: CLINIC | Age: 52
End: 2017-07-25
Payer: COMMERCIAL

## 2017-07-25 ENCOUNTER — APPOINTMENT (OUTPATIENT)
Dept: ULTRASOUND IMAGING | Facility: CLINIC | Age: 52
End: 2017-07-25
Attending: PHYSICIAN ASSISTANT
Payer: COMMERCIAL

## 2017-07-25 ENCOUNTER — APPOINTMENT (OUTPATIENT)
Dept: GENERAL RADIOLOGY | Facility: CLINIC | Age: 52
End: 2017-07-25
Attending: SURGERY
Payer: COMMERCIAL

## 2017-07-25 LAB
ALBUMIN FLD-MCNC: 0.8 G/DL
ALBUMIN SERPL-MCNC: 1.9 G/DL (ref 3.4–5)
ALBUMIN SERPL-MCNC: 2 G/DL (ref 3.4–5)
ALBUMIN UR-MCNC: 100 MG/DL
ALP SERPL-CCNC: 242 U/L (ref 40–150)
ALP SERPL-CCNC: 317 U/L (ref 40–150)
ALT SERPL W P-5'-P-CCNC: 50 U/L (ref 0–70)
ALT SERPL W P-5'-P-CCNC: 60 U/L (ref 0–70)
AMYLASE FLD-CCNC: 8 U/L
ANION GAP SERPL CALCULATED.3IONS-SCNC: 7 MMOL/L (ref 3–14)
ANION GAP SERPL CALCULATED.3IONS-SCNC: 8 MMOL/L (ref 3–14)
ANISOCYTOSIS BLD QL SMEAR: SLIGHT
APPEARANCE FLD: NORMAL
APPEARANCE UR: ABNORMAL
APTT PPP: 41 SEC (ref 22–37)
AST SERPL W P-5'-P-CCNC: 61 U/L (ref 0–45)
AST SERPL W P-5'-P-CCNC: 90 U/L (ref 0–45)
BASE DEFICIT BLDA-SCNC: 4 MMOL/L
BASOPHILS # BLD AUTO: 0 10E9/L (ref 0–0.2)
BASOPHILS NFR BLD AUTO: 0 %
BASOPHILS NFR FLD MANUAL: 2 %
BILIRUB DIRECT SERPL-MCNC: 0.3 MG/DL (ref 0–0.2)
BILIRUB DIRECT SERPL-MCNC: 0.3 MG/DL (ref 0–0.2)
BILIRUB FLD-MCNC: 0.3 MG/DL
BILIRUB SERPL-MCNC: 0.5 MG/DL (ref 0.2–1.3)
BILIRUB SERPL-MCNC: 0.6 MG/DL (ref 0.2–1.3)
BILIRUB UR QL STRIP: NEGATIVE
BUN SERPL-MCNC: 77 MG/DL (ref 7–30)
BUN SERPL-MCNC: 79 MG/DL (ref 7–30)
CALCIUM SERPL-MCNC: 7.6 MG/DL (ref 8.5–10.1)
CALCIUM SERPL-MCNC: 8 MG/DL (ref 8.5–10.1)
CHLORIDE SERPL-SCNC: 104 MMOL/L (ref 94–109)
CHLORIDE SERPL-SCNC: 106 MMOL/L (ref 94–109)
CK SERPL-CCNC: 40 U/L (ref 30–300)
CO2 SERPL-SCNC: 24 MMOL/L (ref 20–32)
CO2 SERPL-SCNC: 26 MMOL/L (ref 20–32)
COLOR FLD: YELLOW
COLOR UR AUTO: ABNORMAL
COPATH REPORT: NORMAL
CREAT SERPL-MCNC: 2.6 MG/DL (ref 0.66–1.25)
CREAT SERPL-MCNC: 2.9 MG/DL (ref 0.66–1.25)
DIFFERENTIAL METHOD BLD: ABNORMAL
EOSINOPHIL # BLD AUTO: 0.1 10E9/L (ref 0–0.7)
EOSINOPHIL NFR BLD AUTO: 0.9 %
ERYTHROCYTE [DISTWIDTH] IN BLOOD BY AUTOMATED COUNT: 16.3 % (ref 10–15)
ERYTHROCYTE [DISTWIDTH] IN BLOOD BY AUTOMATED COUNT: 16.4 % (ref 10–15)
FIBRINOGEN PPP-MCNC: 417 MG/DL (ref 200–420)
GFR SERPL CREATININE-BSD FRML MDRD: 23 ML/MIN/1.7M2
GFR SERPL CREATININE-BSD FRML MDRD: 26 ML/MIN/1.7M2
GLUCOSE BLDC GLUCOMTR-MCNC: 110 MG/DL (ref 70–99)
GLUCOSE BLDC GLUCOMTR-MCNC: 124 MG/DL (ref 70–99)
GLUCOSE BLDC GLUCOMTR-MCNC: 127 MG/DL (ref 70–99)
GLUCOSE BLDC GLUCOMTR-MCNC: 159 MG/DL (ref 70–99)
GLUCOSE BLDC GLUCOMTR-MCNC: 186 MG/DL (ref 70–99)
GLUCOSE BLDC GLUCOMTR-MCNC: 189 MG/DL (ref 70–99)
GLUCOSE BLDC GLUCOMTR-MCNC: 264 MG/DL (ref 70–99)
GLUCOSE BLDC GLUCOMTR-MCNC: 266 MG/DL (ref 70–99)
GLUCOSE BLDC GLUCOMTR-MCNC: 313 MG/DL (ref 70–99)
GLUCOSE BLDC GLUCOMTR-MCNC: 318 MG/DL (ref 70–99)
GLUCOSE BLDC GLUCOMTR-MCNC: 97 MG/DL (ref 70–99)
GLUCOSE BLDC GLUCOMTR-MCNC: 99 MG/DL (ref 70–99)
GLUCOSE FLD-MCNC: 307 MG/DL
GLUCOSE SERPL-MCNC: 176 MG/DL (ref 70–99)
GLUCOSE SERPL-MCNC: 382 MG/DL (ref 70–99)
GLUCOSE UR STRIP-MCNC: 70 MG/DL
GRAM STN SPEC: NORMAL
HCO3 BLD-SCNC: 22 MMOL/L (ref 21–28)
HCO3 BLD-SCNC: 23 MMOL/L (ref 21–28)
HCT VFR BLD AUTO: 23.4 % (ref 40–53)
HCT VFR BLD AUTO: 27.6 % (ref 40–53)
HGB BLD-MCNC: 7.5 G/DL (ref 13.3–17.7)
HGB BLD-MCNC: 8.8 G/DL (ref 13.3–17.7)
HGB UR QL STRIP: ABNORMAL
HYALINE CASTS #/AREA URNS LPF: 8 /LPF (ref 0–2)
INR PPP: 1.23 (ref 0.86–1.14)
KETONES UR STRIP-MCNC: NEGATIVE MG/DL
LACTATE BLD-SCNC: 1.2 MMOL/L (ref 0.7–2.1)
LACTATE SERPL-SCNC: 2.3 MMOL/L (ref 0.4–2)
LDH FLD L TO P-CCNC: 167 U/L
LDH SERPL L TO P-CCNC: 258 U/L (ref 85–227)
LEUKOCYTE ESTERASE UR QL STRIP: ABNORMAL
LYMPHOCYTES # BLD AUTO: 0.4 10E9/L (ref 0.8–5.3)
LYMPHOCYTES NFR BLD AUTO: 3.6 %
LYMPHOCYTES NFR FLD MANUAL: 70 %
MAGNESIUM SERPL-MCNC: 1.9 MG/DL (ref 1.6–2.3)
MCH RBC QN AUTO: 27.7 PG (ref 26.5–33)
MCH RBC QN AUTO: 27.8 PG (ref 26.5–33)
MCHC RBC AUTO-ENTMCNC: 31.9 G/DL (ref 31.5–36.5)
MCHC RBC AUTO-ENTMCNC: 32.1 G/DL (ref 31.5–36.5)
MCV RBC AUTO: 86 FL (ref 78–100)
MCV RBC AUTO: 87 FL (ref 78–100)
METAMYELOCYTES # BLD: 0.1 10E9/L
METAMYELOCYTES NFR BLD MANUAL: 0.9 %
MICRO REPORT STATUS: NORMAL
MONOCYTES # BLD AUTO: 0.3 10E9/L (ref 0–1.3)
MONOCYTES NFR BLD AUTO: 2.7 %
MONOS+MACROS NFR FLD MANUAL: 18 %
MUCOUS THREADS #/AREA URNS LPF: PRESENT /LPF
MYELOCYTES # BLD: 0.1 10E9/L
MYELOCYTES NFR BLD MANUAL: 0.9 %
NEUTROPHILS # BLD AUTO: 10.8 10E9/L (ref 1.6–8.3)
NEUTROPHILS NFR BLD AUTO: 91 %
NEUTS BAND NFR FLD MANUAL: 10 %
NITRATE UR QL: NEGATIVE
NRBC # BLD AUTO: 0.1 10*3/UL
NRBC BLD AUTO-RTO: 1 /100
O2/TOTAL GAS SETTING VFR VENT: 40 %
O2/TOTAL GAS SETTING VFR VENT: ABNORMAL %
OXYHGB MFR BLD: 94 % (ref 92–100)
PCO2 BLD: 42 MM HG (ref 35–45)
PCO2 BLD: 69 MM HG (ref 35–45)
PH BLD: 7.14 PH (ref 7.35–7.45)
PH BLD: 7.32 PH (ref 7.35–7.45)
PH UR STRIP: 5 PH (ref 5–7)
PHOSPHATE SERPL-MCNC: 4.8 MG/DL (ref 2.5–4.5)
PLATELET # BLD AUTO: 141 10E9/L (ref 150–450)
PLATELET # BLD AUTO: 168 10E9/L (ref 150–450)
PLATELET # BLD EST: ABNORMAL 10*3/UL
PO2 BLD: 103 MM HG (ref 80–105)
PO2 BLD: 81 MM HG (ref 80–105)
POIKILOCYTOSIS BLD QL SMEAR: SLIGHT
POTASSIUM SERPL-SCNC: 4 MMOL/L (ref 3.4–5.3)
POTASSIUM SERPL-SCNC: 4.4 MMOL/L (ref 3.4–5.3)
PROT FLD-MCNC: 2.1 G/DL
PROT SERPL-MCNC: 6.3 G/DL (ref 6.8–8.8)
PROT SERPL-MCNC: 6.6 G/DL (ref 6.8–8.8)
RADIOLOGIST FLAGS: ABNORMAL
RBC # BLD AUTO: 2.71 10E12/L (ref 4.4–5.9)
RBC # BLD AUTO: 3.17 10E12/L (ref 4.4–5.9)
RBC #/AREA URNS AUTO: >182 /HPF (ref 0–2)
SODIUM SERPL-SCNC: 137 MMOL/L (ref 133–144)
SODIUM SERPL-SCNC: 138 MMOL/L (ref 133–144)
SP GR UR STRIP: 1.01 (ref 1–1.03)
SPECIMEN SOURCE FLD: NORMAL
SPECIMEN SOURCE: NORMAL
SQUAMOUS #/AREA URNS AUTO: 13 /HPF (ref 0–1)
TACROLIMUS BLD-MCNC: 13.1 UG/L (ref 5–15)
TME LAST DOSE: NORMAL H
TRANS CELLS #/AREA URNS HPF: <1 /HPF (ref 0–1)
URN SPEC COLLECT METH UR: ABNORMAL
UROBILINOGEN UR STRIP-MCNC: NORMAL MG/DL (ref 0–2)
WBC # BLD AUTO: 10.3 10E9/L (ref 4–11)
WBC # BLD AUTO: 11.9 10E9/L (ref 4–11)
WBC # FLD AUTO: 86 /UL
WBC #/AREA URNS AUTO: 5 /HPF (ref 0–2)

## 2017-07-25 PROCEDURE — 40000986 XR CHEST PORT 1 VW

## 2017-07-25 PROCEDURE — 36415 COLL VENOUS BLD VENIPUNCTURE: CPT | Performed by: SURGERY

## 2017-07-25 PROCEDURE — 40000275 ZZH STATISTIC RCP TIME EA 10 MIN

## 2017-07-25 PROCEDURE — 00000146 ZZHCL STATISTIC GLUCOSE BY METER IP

## 2017-07-25 PROCEDURE — 87086 URINE CULTURE/COLONY COUNT: CPT | Performed by: PHYSICIAN ASSISTANT

## 2017-07-25 PROCEDURE — 40000556 ZZH STATISTIC PERIPHERAL IV START W US GUIDANCE

## 2017-07-25 PROCEDURE — 74000 XR ABDOMEN PORT F1 VW: CPT

## 2017-07-25 PROCEDURE — P9047 ALBUMIN (HUMAN), 25%, 50ML: HCPCS | Performed by: SURGERY

## 2017-07-25 PROCEDURE — 25000132 ZZH RX MED GY IP 250 OP 250 PS 637: Performed by: TRANSPLANT SURGERY

## 2017-07-25 PROCEDURE — 25000125 ZZHC RX 250: Performed by: SURGERY

## 2017-07-25 PROCEDURE — P9047 ALBUMIN (HUMAN), 25%, 50ML: HCPCS

## 2017-07-25 PROCEDURE — 36600 WITHDRAWAL OF ARTERIAL BLOOD: CPT

## 2017-07-25 PROCEDURE — 80076 HEPATIC FUNCTION PANEL: CPT | Performed by: PHYSICIAN ASSISTANT

## 2017-07-25 PROCEDURE — 82150 ASSAY OF AMYLASE: CPT | Performed by: SURGERY

## 2017-07-25 PROCEDURE — 00000102 ZZHCL STATISTIC CYTO WRIGHT STAIN TC: Performed by: SURGERY

## 2017-07-25 PROCEDURE — 87070 CULTURE OTHR SPECIMN AEROBIC: CPT | Performed by: SURGERY

## 2017-07-25 PROCEDURE — 25000128 H RX IP 250 OP 636: Performed by: PHYSICIAN ASSISTANT

## 2017-07-25 PROCEDURE — 87075 CULTR BACTERIA EXCEPT BLOOD: CPT | Performed by: SURGERY

## 2017-07-25 PROCEDURE — 82247 BILIRUBIN TOTAL: CPT | Performed by: SURGERY

## 2017-07-25 PROCEDURE — 25000131 ZZH RX MED GY IP 250 OP 636 PS 637: Performed by: TRANSPLANT SURGERY

## 2017-07-25 PROCEDURE — 27210432 ZZH NUTRITION PRODUCT RENAL BASIC LITER

## 2017-07-25 PROCEDURE — 71010 XR CHEST PORT 1 VW: CPT

## 2017-07-25 PROCEDURE — 25000132 ZZH RX MED GY IP 250 OP 250 PS 637: Performed by: SURGERY

## 2017-07-25 PROCEDURE — 85384 FIBRINOGEN ACTIVITY: CPT | Performed by: SURGERY

## 2017-07-25 PROCEDURE — 40000978 ZZH STATISTIC COMPARTMENT STUDY

## 2017-07-25 PROCEDURE — 85610 PROTHROMBIN TIME: CPT | Performed by: SURGERY

## 2017-07-25 PROCEDURE — 80048 BASIC METABOLIC PNL TOTAL CA: CPT | Performed by: PHYSICIAN ASSISTANT

## 2017-07-25 PROCEDURE — 25000132 ZZH RX MED GY IP 250 OP 250 PS 637: Performed by: PHYSICIAN ASSISTANT

## 2017-07-25 PROCEDURE — 25000128 H RX IP 250 OP 636: Performed by: NURSE ANESTHETIST, CERTIFIED REGISTERED

## 2017-07-25 PROCEDURE — 27210913 ZZH NUTRITION PRODUCT INTERMEDIATE PACKET

## 2017-07-25 PROCEDURE — 25000132 ZZH RX MED GY IP 250 OP 250 PS 637: Performed by: NURSE PRACTITIONER

## 2017-07-25 PROCEDURE — 44500 INTRO GASTROINTESTINAL TUBE: CPT

## 2017-07-25 PROCEDURE — 36415 COLL VENOUS BLD VENIPUNCTURE: CPT | Performed by: INTERNAL MEDICINE

## 2017-07-25 PROCEDURE — 82803 BLOOD GASES ANY COMBINATION: CPT | Performed by: PHYSICIAN ASSISTANT

## 2017-07-25 PROCEDURE — 87102 FUNGUS ISOLATION CULTURE: CPT | Performed by: SURGERY

## 2017-07-25 PROCEDURE — 94002 VENT MGMT INPAT INIT DAY: CPT

## 2017-07-25 PROCEDURE — 85730 THROMBOPLASTIN TIME PARTIAL: CPT | Performed by: SURGERY

## 2017-07-25 PROCEDURE — 25000128 H RX IP 250 OP 636: Performed by: STUDENT IN AN ORGANIZED HEALTH CARE EDUCATION/TRAINING PROGRAM

## 2017-07-25 PROCEDURE — 82042 OTHER SOURCE ALBUMIN QUAN EA: CPT | Performed by: SURGERY

## 2017-07-25 PROCEDURE — 25000132 ZZH RX MED GY IP 250 OP 250 PS 637: Performed by: STUDENT IN AN ORGANIZED HEALTH CARE EDUCATION/TRAINING PROGRAM

## 2017-07-25 PROCEDURE — 80197 ASSAY OF TACROLIMUS: CPT | Performed by: PHYSICIAN ASSISTANT

## 2017-07-25 PROCEDURE — 20000004 ZZH R&B ICU UMMC

## 2017-07-25 PROCEDURE — 49083 ABD PARACENTESIS W/IMAGING: CPT | Mod: GC | Performed by: SURGERY

## 2017-07-25 PROCEDURE — 83735 ASSAY OF MAGNESIUM: CPT | Performed by: SURGERY

## 2017-07-25 PROCEDURE — 83615 LACTATE (LD) (LDH) ENZYME: CPT | Performed by: PHYSICIAN ASSISTANT

## 2017-07-25 PROCEDURE — 80076 HEPATIC FUNCTION PANEL: CPT | Performed by: SURGERY

## 2017-07-25 PROCEDURE — 81001 URINALYSIS AUTO W/SCOPE: CPT | Performed by: PHYSICIAN ASSISTANT

## 2017-07-25 PROCEDURE — 27211315 ZZ H KIT, BLADDER PRESSURE

## 2017-07-25 PROCEDURE — 88112 CYTOPATH CELL ENHANCE TECH: CPT | Performed by: SURGERY

## 2017-07-25 PROCEDURE — 40000281 ZZH STATISTIC TRANSPORT TIME EA 15 MIN

## 2017-07-25 PROCEDURE — 87040 BLOOD CULTURE FOR BACTERIA: CPT | Performed by: PHYSICIAN ASSISTANT

## 2017-07-25 PROCEDURE — 80048 BASIC METABOLIC PNL TOTAL CA: CPT | Performed by: SURGERY

## 2017-07-25 PROCEDURE — 89051 BODY FLUID CELL COUNT: CPT | Performed by: SURGERY

## 2017-07-25 PROCEDURE — 83615 LACTATE (LD) (LDH) ENZYME: CPT | Performed by: SURGERY

## 2017-07-25 PROCEDURE — 97530 THERAPEUTIC ACTIVITIES: CPT | Mod: GP

## 2017-07-25 PROCEDURE — 85025 COMPLETE CBC W/AUTO DIFF WBC: CPT | Performed by: PHYSICIAN ASSISTANT

## 2017-07-25 PROCEDURE — 36415 COLL VENOUS BLD VENIPUNCTURE: CPT

## 2017-07-25 PROCEDURE — 25000128 H RX IP 250 OP 636: Performed by: ANESTHESIOLOGY

## 2017-07-25 PROCEDURE — 40000671 ZZH STATISTIC ANESTHESIA CASE

## 2017-07-25 PROCEDURE — 25000128 H RX IP 250 OP 636: Performed by: SURGERY

## 2017-07-25 PROCEDURE — 83605 ASSAY OF LACTIC ACID: CPT | Performed by: SURGERY

## 2017-07-25 PROCEDURE — 76700 US EXAM ABDOM COMPLETE: CPT | Mod: XS

## 2017-07-25 PROCEDURE — 40000193 ZZH STATISTIC PT WARD VISIT

## 2017-07-25 PROCEDURE — 25000125 ZZHC RX 250: Performed by: PHYSICIAN ASSISTANT

## 2017-07-25 PROCEDURE — 84100 ASSAY OF PHOSPHORUS: CPT | Performed by: SURGERY

## 2017-07-25 PROCEDURE — 84157 ASSAY OF PROTEIN OTHER: CPT | Performed by: SURGERY

## 2017-07-25 PROCEDURE — 25000128 H RX IP 250 OP 636

## 2017-07-25 PROCEDURE — 82805 BLOOD GASES W/O2 SATURATION: CPT | Performed by: SURGERY

## 2017-07-25 PROCEDURE — 36415 COLL VENOUS BLD VENIPUNCTURE: CPT | Performed by: PHYSICIAN ASSISTANT

## 2017-07-25 PROCEDURE — 74176 CT ABD & PELVIS W/O CONTRAST: CPT

## 2017-07-25 PROCEDURE — 00000155 ZZHCL STATISTIC H-CELL BLOCK W/STAIN: Performed by: SURGERY

## 2017-07-25 PROCEDURE — 82945 GLUCOSE OTHER FLUID: CPT | Performed by: SURGERY

## 2017-07-25 PROCEDURE — 83605 ASSAY OF LACTIC ACID: CPT | Performed by: INTERNAL MEDICINE

## 2017-07-25 PROCEDURE — 99291 CRITICAL CARE FIRST HOUR: CPT | Mod: 25 | Performed by: SURGERY

## 2017-07-25 PROCEDURE — 85027 COMPLETE CBC AUTOMATED: CPT | Performed by: SURGERY

## 2017-07-25 PROCEDURE — 76705 ECHO EXAM OF ABDOMEN: CPT

## 2017-07-25 PROCEDURE — 82550 ASSAY OF CK (CPK): CPT | Performed by: PHYSICIAN ASSISTANT

## 2017-07-25 PROCEDURE — 87205 SMEAR GRAM STAIN: CPT | Performed by: SURGERY

## 2017-07-25 PROCEDURE — 88305 TISSUE EXAM BY PATHOLOGIST: CPT | Performed by: SURGERY

## 2017-07-25 RX ORDER — NICOTINE POLACRILEX 4 MG
15-30 LOZENGE BUCCAL
Status: DISCONTINUED | OUTPATIENT
Start: 2017-07-25 | End: 2017-09-08 | Stop reason: HOSPADM

## 2017-07-25 RX ORDER — AMINO ACIDS/PROTEIN HYDROLYS 11G-40/45
1 LIQUID IN PACKET (ML) ORAL 2 TIMES DAILY
Status: DISCONTINUED | OUTPATIENT
Start: 2017-07-25 | End: 2017-07-26

## 2017-07-25 RX ORDER — ALBUMIN (HUMAN) 12.5 G/50ML
SOLUTION INTRAVENOUS
Status: COMPLETED
Start: 2017-07-25 | End: 2017-07-25

## 2017-07-25 RX ORDER — SODIUM CHLORIDE 9 MG/ML
INJECTION, SOLUTION INTRAVENOUS CONTINUOUS
Status: DISCONTINUED | OUTPATIENT
Start: 2017-07-25 | End: 2017-07-27

## 2017-07-25 RX ORDER — PROPOFOL 10 MG/ML
5-75 INJECTION, EMULSION INTRAVENOUS CONTINUOUS
Status: DISCONTINUED | OUTPATIENT
Start: 2017-07-25 | End: 2017-07-27

## 2017-07-25 RX ORDER — MEROPENEM 1 G/1
1 INJECTION, POWDER, FOR SOLUTION INTRAVENOUS EVERY 12 HOURS
Status: DISCONTINUED | OUTPATIENT
Start: 2017-07-25 | End: 2017-07-29

## 2017-07-25 RX ORDER — PIPERACILLIN SODIUM, TAZOBACTAM SODIUM 3; .375 G/15ML; G/15ML
3.38 INJECTION, POWDER, LYOPHILIZED, FOR SOLUTION INTRAVENOUS EVERY 6 HOURS
Status: DISCONTINUED | OUTPATIENT
Start: 2017-07-25 | End: 2017-07-25

## 2017-07-25 RX ORDER — ALBUMIN (HUMAN) 12.5 G/50ML
25 SOLUTION INTRAVENOUS EVERY 6 HOURS
Status: DISPENSED | OUTPATIENT
Start: 2017-07-25 | End: 2017-07-28

## 2017-07-25 RX ORDER — PROPOFOL 10 MG/ML
10-20 INJECTION, EMULSION INTRAVENOUS EVERY 30 MIN PRN
Status: DISCONTINUED | OUTPATIENT
Start: 2017-07-25 | End: 2017-07-27

## 2017-07-25 RX ORDER — NALOXONE HYDROCHLORIDE 0.4 MG/ML
.1-.4 INJECTION, SOLUTION INTRAMUSCULAR; INTRAVENOUS; SUBCUTANEOUS
Status: DISCONTINUED | OUTPATIENT
Start: 2017-07-25 | End: 2017-07-27

## 2017-07-25 RX ORDER — DEXTROSE MONOHYDRATE 25 G/50ML
25-50 INJECTION, SOLUTION INTRAVENOUS
Status: DISCONTINUED | OUTPATIENT
Start: 2017-07-25 | End: 2017-09-08 | Stop reason: HOSPADM

## 2017-07-25 RX ORDER — ALBUMIN (HUMAN) 12.5 G/50ML
25 SOLUTION INTRAVENOUS ONCE
Status: COMPLETED | OUTPATIENT
Start: 2017-07-25 | End: 2017-07-25

## 2017-07-25 RX ORDER — IPRATROPIUM BROMIDE AND ALBUTEROL SULFATE 2.5; .5 MG/3ML; MG/3ML
3 SOLUTION RESPIRATORY (INHALATION) EVERY 4 HOURS PRN
Status: DISCONTINUED | OUTPATIENT
Start: 2017-07-25 | End: 2017-08-15

## 2017-07-25 RX ADMIN — PANTOPRAZOLE SODIUM 40 MG: 40 TABLET, DELAYED RELEASE ORAL at 08:23

## 2017-07-25 RX ADMIN — FLUDROCORTISONE ACETATE 100 MCG: 0.1 TABLET ORAL at 08:23

## 2017-07-25 RX ADMIN — DAPTOMYCIN 750 MG: 500 INJECTION, POWDER, LYOPHILIZED, FOR SOLUTION INTRAVENOUS at 17:27

## 2017-07-25 RX ADMIN — GANCICLOVIR SODIUM 450 MG: 500 INJECTION, POWDER, LYOPHILIZED, FOR SOLUTION INTRAVENOUS at 18:54

## 2017-07-25 RX ADMIN — ALBUMIN (HUMAN) 25 G: 12.5 SOLUTION INTRAVENOUS at 14:24

## 2017-07-25 RX ADMIN — PROPOFOL 50 MG: 10 INJECTION, EMULSION INTRAVENOUS at 12:26

## 2017-07-25 RX ADMIN — SODIUM CHLORIDE: 9 INJECTION, SOLUTION INTRAVENOUS at 23:10

## 2017-07-25 RX ADMIN — PROPOFOL 50 MCG/KG/MIN: 10 INJECTION, EMULSION INTRAVENOUS at 19:29

## 2017-07-25 RX ADMIN — PHENYLEPHRINE HYDROCHLORIDE 100 MCG: 10 INJECTION, SOLUTION INTRAMUSCULAR; INTRAVENOUS; SUBCUTANEOUS at 12:31

## 2017-07-25 RX ADMIN — PROPOFOL 10 MCG/KG/MIN: 10 INJECTION, EMULSION INTRAVENOUS at 12:34

## 2017-07-25 RX ADMIN — HEPARIN SODIUM 5000 UNITS: 5000 INJECTION, SOLUTION INTRAVENOUS; SUBCUTANEOUS at 14:24

## 2017-07-25 RX ADMIN — PROPOFOL 50 MCG/KG/MIN: 10 INJECTION, EMULSION INTRAVENOUS at 23:09

## 2017-07-25 RX ADMIN — HUMAN INSULIN 5 UNITS/HR: 100 INJECTION, SOLUTION SUBCUTANEOUS at 13:57

## 2017-07-25 RX ADMIN — OXYCODONE HYDROCHLORIDE 5 MG: 5 SOLUTION ORAL at 02:12

## 2017-07-25 RX ADMIN — AMLODIPINE BESYLATE 5 MG: 10 TABLET ORAL at 08:23

## 2017-07-25 RX ADMIN — ACETAMINOPHEN 650 MG: 325 TABLET, FILM COATED ORAL at 16:33

## 2017-07-25 RX ADMIN — MEROPENEM 1 G: 1 INJECTION, POWDER, FOR SOLUTION INTRAVENOUS at 14:13

## 2017-07-25 RX ADMIN — TACROLIMUS 8 MG: 5 CAPSULE ORAL at 08:23

## 2017-07-25 RX ADMIN — GANCICLOVIR SODIUM 250 MG: 500 INJECTION, POWDER, LYOPHILIZED, FOR SOLUTION INTRAVENOUS at 05:07

## 2017-07-25 RX ADMIN — MICAFUNGIN SODIUM 100 MG: 10 INJECTION, POWDER, LYOPHILIZED, FOR SOLUTION INTRAVENOUS at 13:05

## 2017-07-25 RX ADMIN — SODIUM CHLORIDE 1000 ML: 9 INJECTION, SOLUTION INTRAVENOUS at 19:06

## 2017-07-25 RX ADMIN — ACETAMINOPHEN 650 MG: 325 TABLET, FILM COATED ORAL at 01:20

## 2017-07-25 RX ADMIN — Medication 0.2 MG: at 09:32

## 2017-07-25 RX ADMIN — Medication 80 MG: at 08:23

## 2017-07-25 RX ADMIN — ROCURONIUM BROMIDE 50 MG: 10 INJECTION INTRAVENOUS at 12:26

## 2017-07-25 RX ADMIN — MULTIVIT AND MINERALS-FERROUS GLUCONATE 9 MG IRON/15 ML ORAL LIQUID 15 ML: at 08:23

## 2017-07-25 RX ADMIN — NALOXONE HYDROCHLORIDE 0.4 MG: 0.4 INJECTION, SOLUTION INTRAMUSCULAR; INTRAVENOUS; SUBCUTANEOUS at 11:10

## 2017-07-25 RX ADMIN — FLUDROCORTISONE ACETATE 100 MCG: 0.1 TABLET ORAL at 20:37

## 2017-07-25 RX ADMIN — PIPERACILLIN AND TAZOBACTAM 3.38 G: 3; .375 INJECTION, POWDER, LYOPHILIZED, FOR SOLUTION INTRAVENOUS; PARENTERAL at 09:58

## 2017-07-25 RX ADMIN — HEPARIN SODIUM 5000 UNITS: 5000 INJECTION, SOLUTION INTRAVENOUS; SUBCUTANEOUS at 04:06

## 2017-07-25 RX ADMIN — ALBUMIN (HUMAN) 25 G: 12.5 SOLUTION INTRAVENOUS at 18:19

## 2017-07-25 ASSESSMENT — PAIN DESCRIPTION - DESCRIPTORS: DESCRIPTORS: ACHING

## 2017-07-25 NOTE — PROGRESS NOTES
CLINICAL NUTRITION SERVICES - REASSESSMENT NOTE     Nutrition Prescription    Malnutrition Status:    Non-severe malnutrition in the context of acute on chronic illness    Recommendations already ordered by Registered Dietitian (RD):    Nepro  @ goal 60 ml/hr + 2 pkt Prosource to provide 2672 kcals (29  kcal/kg/day), 139 g PRO (1.5 g/kg/day), 1051 ml free H2O, 232 g CHO and 18 g Fiber daily.    Increased free water to 60 ml q 1 hour (maintenance needs with TF) per team request    Future/Additional Recommendations:  Minimize interruptions to TF as much as possible.      EVALUATION OF THE PROGRESS TOWARD GOALS   Diet: NPO     Nutrition Support: Nepro @ goal 60 ml/hr (1440 ml/day) to provide 2592 kcals (28 kcal/kg/day), 117 g PRO (1.3 g/kg/day), 1051 ml free H2O, 232 g CHO and 18 g Fiber daily.    Intake: Patient received~83% goal TF volumes over the last 7 days, meeting 2439 kcal (27 kcal/kg), 109 grams protein (1.2g/kg).  Less than goal nutrition was received d/t procedures. Had been on a DD2-DD3 diet, declining meals or eating only a couple bites of meals at a time.      NEW FINDINGS   Dosing Weight: 92 kg (dry wt) = lowest wt so far this adm of 92.3 kg on 7/14/17  Adm wt: 94.2 kg ( 7/4/17)  IBW: 80.9 kg     ASSESSED NUTRITION NEEDS  Estimated Energy Needs: [phone] kcals/day (25-30-35 kcal/kg)  Justification: low end for intubation s/p liver transplant, high end increased needs s/p liver transplant s/p extubation  Estimated Protein Needs: 140-186  grams protein/day (1.5-2 grams of pro/kg)  Justification: Post-op, acute renal dysfunction  Estimated Fluid Needs:(1 mL/kcal) Maintenance or Per provider pending fluid status    Neutropenic colitis, possible CMV colitits    1 BM daily over the last 2 days    MALNUTRITION  % Intake: Decreased intake does not meet criteria  % Weight Loss: Down 2% in the last 2-3 weeks (non-severe)  Subcutaneous Fat Loss: Facial region and Thoracic/intercostal:  At least Mild - per  previous  Muscle Loss: Temporal, Facial & jaw region, Thoracic region (clavicle, acromium bone, deltoid, trapezius, pectoral) and Upper leg (quadricep, hamstring):  Mild to Moderate - per previous  Fluid Accumulation/Edema: Moderate ascites  Malnutrition Diagnosis: Non-severe malnutrition in the context of acute on chronic illness    Previous Goals   Total avg nutritional intake to meet a minimum of 30 kcal/kg and 1.5 g PRO/kg daily (per dosing wt 93 kg).  Evaluation: NOT MET    Previous Nutrition Diagnosis  Inadequate protein-energy intake  Evaluation: Improving, but ongoing    CURRENT NUTRITION DIAGNOSIS  Inadequate enteral nutrition infusion related to TF held for procedures as evidenced by patient received  75-85% of assessed nutritional needs (27 kcal/kg, 1.2g/kg protein) over the last 7 days.     INTERVENTIONS  Implementation  Collaboration with other providers/Feeding tube flush - discussed fluid status with providers, adjusted free water per team request    Added protein modular to ensure protein needs are met.     Patient busy with other cares this afternoon - unable to physically assess patient today    Goals  Total avg nutritional intake to meet a minimum of 25 kcal/kg and 1.5 g PRO/kg daily (per dosing wt 92 kg).    Monitoring/Evaluation  Progress toward goals will be monitored and evaluated per protocol.    Janice Salomon MS, RD, LD  Pager [phone]

## 2017-07-25 NOTE — PROCEDURES
Pre-Procedure Diagnosis: hypotension   Post-Procedure Diagnosis: hypotension     Procedure: Central Venous Catheter Placement    Consent: Emergent      Technique: The right neck was prepped with 2% chlorhexidine and draped with a full length sterile sheet in the usual fashion. The right internal jugular vein was accessed under ultrasound guidance with an 18 gauge thin wall needle.  A 7 Romanian triple lumen catheter was inserted via the seldinger technique. Blood was withdrawn from all lumens and flushed with normal saline.  The catheter was sutured in place and a sterile dressing was applied over the site prior to removal of drapes.      The patient tolerated the procedure well and there were no complications.    Chest x ray is pending at this time.      Zainab Luciano MD  Anesthesiology Resident CA-2    Dr. Feliz was present for the critical portions of the procedure

## 2017-07-25 NOTE — ANESTHESIA PROCEDURE NOTES
ANESTHESIOLOGY RESIDENT/CRNA INTUBATION NOTE  Indication for intubation: respiratory insufficiency, altered level of consciousness, airway protection.  Provider Ordering Intubation: ALEJANDRO MONTILLA  History regarding the most recent potassium obtained: Yes  History regarding renal failure obtained: Yes  History of presence or absence of CVA/stroke was obtained: Yes  History of presence or absence of NM disorder obtained: Yes  Post Intubation:  No apparent complications, Sedation to be ordered by primary/ICU team, Primary/ICU team to review CXR, Vent settings by primary/ICU team, ETT secured and Report given to primary nurse and/or team  Report to Rn at bedside,  Md aware of meds for BP support

## 2017-07-25 NOTE — PROVIDER NOTIFICATION
Triggered sepsis protocol with 0400 vital signs. On call provider notified. No new orders at this time.

## 2017-07-25 NOTE — PLAN OF CARE
Problem: Goal Outcome Summary  Goal: Goal Outcome Summary  Outcome: No Change  [id number]: T.max 100.6, following dose of Tylenol of headache, temperature recheck 98. OVSS on 3L O2. Pt disoriented to time and place. Pt complaining of headache at the beginning of the shift, PRN Tylenol given x1. Pt also complaining of some abdominal discomfort, Lidocaine patches applied. BS checks every 4 hours. BS check at 1600 was 133, no insulin needed per order parameters. BS check at 2000 was 125, no insulin needed per order parameters. Pt on DD3 diet, pt ate a couple bites of his dinner and stated he did not have an appetite. Pt complaining of some nausea at the end of this shift but denied the administration of antiemetics. PIV L Forearm pulled out by the patient this shift. New PIV placed in the R Forearm, currently infusing with TKO NS at 10 mL/hr. NJ tube with cycled tube feeds (8Pm-8AM) at 115 mL/hr with 60mL water flushes every 3 hours. Crowe with adequate amounts of urine output, 500 mL emptied this shift. No BM this shift, pt passing flatus. Incision c/d/i, no drainage, approximated with staples, and open to air. Bedside attendant due to patient pulling at lines and confusion. Call light in reach. Will continue to monitor and follow plan of care.

## 2017-07-25 NOTE — PROGRESS NOTES
[facility]    Transplant Infectious Diseases Inpatient Progress note      Camacho Bhagat MRN# [id number]   YOB: 1965 Age: 51 year old   Date of Admission: 7/4/2017  4:45 AM  Transplant: 3/4/2017 (Liver), Postoperative day: 143            Recommendations:   1. Meropenem IV STAT.   2. Daptomycin IV STAT.   3. Micafungin 100 mg daily STAT.   4. Continue GCV IV but increase dose to 5 mg/kg q12 hr.   5. Repeat CMV PCR 7/27/2017.    6. EBV PCR every other week.   7. Agree with blood cx and UA and Ucx.   8. May need paracentesis     Critically ill patient.         Summary of Presentation:   Transplants:  3/4/2017 (Liver), Postoperative day:  143     This patient is a 51 year old male with CASTAÑEDA s/p OLT in 3/2017. Basilixima for induction. Now on TAC, MMF was held due to neutropenia and multiple infections.   Presented with colitis, s/p exploratory laparotomy.        Active Problems and Infectious Diseases Issues:   1. Severe sepsis picture.   The source is likely the abdomen.   Will start meropenem/daptomycin/micafungin stat.   Further recommendations to follow. .    2. Neutropenic vs CMV vs PTLD colitis (without ischemia per exp lap)   3. CMV primary infection (donor derived)  4. EBV primary infection (donor derived)  Finished 14 days of ertapenem on 7/18/2017 for colitis thought to be due to neutropenia. The neutropenia was attributed to drugs (MMF/bactrim/VGCV, all were DCed prior to presentation).   The ascitic fluid analysis is c/w exudate but cx negative to date. That could have been due to inflamed bowels.     The primary CMV infection is donor derived following discontinuation of VGCV and in part due to acute illness as well.   Whether the patient also has CMV colitis is not clear but now the patient needs to be treated for primary CMV infection anyway due to CMV viral load above the threshold of 1500 IU/mL.   IV GCV was initiated 7/20/2017. We should continue to follow  weekly CMV PCR. The treatment with either GCV or VGCV should be continued for minimum of 2 weeks.   VGCV ppx was DCed in the past prior to admission due to neutropenia, then resumed briefly from 7/15/2017 till 7/19/2017. So we have to be vigilant for potential CMV resistance in the patient. Given increasing abdominal distention I would increase the dose to 5 mg/kg bid for potential resistance.    The CMV in the BAL is of no clinical significance.     Colonoscopy and biopsy should also help ruling out GI PTLD (was with poor prep). Continue to monitor EBV PCR every other week.   The CT of abdomen did show LA but that could have resulted from the colitis.   Ascitic fluid cytology and leukemia/lymphoma studies negative.   MRI of brain not suggestive of CNS PTLD.     5. Encephalopathy.   Likely due to sepsis.   With negative MRI of brain for lesions.     6. 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    These are likely all colonizers or contaminants.   The patient is started on daptomycin for severe sepsis and colonization with VRE.     7. Staphylococcus capitis in ascitic fluid 7/5/2017   Contamination.     8. Rhinovirus in BAL 7/15/2017  Likely to be due to chronic shedding since the patient's main presentation was mostly abdominal not respiratory.      Other Infectious Disease issues include  - PCP prophylaxis: bactrim DCed in June of 2017 due to neutropenia.   - Serostatus: CMV D+/R-, EBV D+/R-, HSV1?/2?, VZV ?   Now he's on GCV IV.   - Immunization status: up-to-date  - Gamma globulin status: >1660 as of 7/24/2017.       Discussed with primary team.   Attestation:  I interviewed the patient and obtained history from the patient, and by reviewing the patient's chart including outside records, microbiological data, and radiological data. All data are summarized in this notes.  Naseem Figuerao   Pager:  [phone]  07/25/2017        Interim History:  7/25/2017: Patient is obtunded, shallow breathing.  Patient is receiving IVF.      7/21/2017: Underwent colonoscopy but with poor prep, it was poor study.      HPI:  In summary:  CASTAÑEDA s/p OLT in 3/2017.   Presented with abdominal pain and underwent exploratory laparotomy which was not c/w with ischemia. It was felt to be related to neutropenia.   The patient has neutropenia since June of 2017 attributed to immunosuppressive therapy/bactrim/VGCV.   Bactrim DCed June of 2017 and VGCV was DCed at unknown tome.     The patient was started on broad spectrum ABx with persistent fever.   Bronchoscopy done 7/12/2017 grew single colony of VRE which was considered a colonizer.   Ascitic fluid checked on 7/5/2017 and grew S capitis considered a contaminant.   Repeat BAL on 7/15/2017 and repeat paracentesis on 7/7/14/2017 are so far unremarkable for growth but the ascitic fluid is an exudate.     The course was complicated by thrombosis of the right radial artery with ischemia to to the tip of the right index finger.   He also has ischemia to the right hallux and second toe.       ROS:  ROS was unobtainalbe due the patient's poor mentation.                  Pysical Examination:  Temp: 98.9  F (37.2  C) Temp src: Axillary BP: 126/69   Heart Rate: 105 Resp: 26 SpO2: 94 % O2 Device: Oxi Plus Oxygen Delivery: 7 LPM  Vitals:    07/16/17 0600 07/17/17 0600 07/19/17 0936 07/24/17 0254   Weight: 93.5 kg (206 lb 2.1 oz) 93.2 kg (205 lb 7.5 oz) 107 kg (235 lb 12.8 oz) 106.9 kg (235 lb 11.2 oz)    07/25/17 0900   Weight: 112 kg (246 lb 14.4 oz)     Constitutional: obtunded still responds to pain stimuli.   CVS: tachycardic, normal S1/S2, no murmur.   Lungs: coarse BS bilaterally anteriorly.   Abdomen: Distended, Tympanic in the right, no masses, no bruit, normal BS.   Extremities: +2-3 pitting edema of bilateral lower extremities, ischemic right hallux and right second toe, also ischemic  tip of left second toe, and right index, no ulcers, normal ROM of all joints, no swelling or erythema of any of joints and no tenderness to palpation.   Skin: as the exam of the extremities, no induration, fluctuation or discharge,and no rash       Medications:  Medications that Require Transfusion:     NaCl 100 mL/hr at 07/25/17 1100     IV fluid REPLACEMENT ONLY 25 mL/hr at 07/20/17 0430   Scheduled Medications:     meropenem  1 g Intravenous Q12H     DAPTOmycin (CUBICIN) intermittent infusion  8 mg/kg (Dosing Weight) Intravenous Q24H     micafungin  100 mg Intravenous Q24H     sodium chloride 0.9%  1,000 mL Intravenous Once     insulin isophane human  40 Units Subcutaneous QPM     tacrolimus  8 mg Oral BID IS     insulin glargine  20 Units Subcutaneous Q24H     fludrocortisone  100 mcg Oral BID     ganciclovir (CYTOVENE) intermittent infusion  2.5 mg/kg (Dosing Weight) Intravenous Q12H     insulin aspart  1-12 Units Subcutaneous Q4H     aspirin  80 mg Oral Daily     lidocaine  1-2 patch Transdermal Q24h    And     lidocaine   Transdermal Q24H    And     lidocaine   Transdermal Q8H     cyclobenzaprine  10 mg Oral At Bedtime     insulin aspart   Subcutaneous TID w/meals     cloNIDine  0.2 mg Oral BID     amLODIPine  5 mg Oral Daily     multivitamins with minerals  15 mL Per Feeding Tube Daily     heparin  5,000 Units Subcutaneous Q8H     pantoprazole  40 mg Oral or Feeding Tube Daily     sodium chloride (PF)  3 mL Intracatheter Q8H       Laboratory Data:   No results found for: ACD4    Inflammatory Markers    Recent Labs   Lab Test  07/05/17   1810  03/05/17   0358  11/23/16   0813  11/16/16   0706  11/15/16   1039  11/07/16   0759  11/03/16   0949  11/01/16   0853   08/15/11   1151  04/11/11   1209  01/03/11   1246  02/15/10   1232   SED   --    --   45*   --    --    --    --    --    --   11 14  17*  25*   CRP  354.0  20.0*  58.0*  59.1*  49.8*  66.0*  140.0*  150.0*   < >  11.1*  9.0*  11.7*  17.5*    < > =  values in this interval not displayed.       Immune Globulin Studies     Recent Labs   Lab Test  07/24/17   0705  08/15/11   1243   IGG  1660*  1160   IGG1   --   734   IGG2   --   294   IGG3   --   7*   IGG4   --   59       Metabolic Studies       Recent Labs   Lab Test  07/25/17   0945  07/25/17   0442  07/24/17   0705  07/23/17   0750  07/23/17   0625  07/22/17   0638  07/22/17   0313  07/21/17   1650  07/21/17   0615   07/16/17   0326   07/14/17   0349   07/06/17   0316   NA  137   --   142   --   144  147*  149*  149*  150*   < >  148*   < >  144   < >  143   POTASSIUM  4.0   --   3.8   --   3.8  3.8  4.1   --   4.9   < >  5.1   < >  4.4   < >  5.0   CHLORIDE  104   --   112*   --   112*  116*  118*   --   117*   < >  123*   < >  116*   < >  116*   CO2  26   --   24   --   23  23  26   --   25   < >  22   < >  20   < >  18*   ANIONGAP  7   --   6   --   9  7  4   --   8   < >  3   < >  8   < >  9   BUN  77*   --   65*   --   62*  59*  58*   --   70*   < >  71*   < >  84*   < >  62*   CR  2.60*   --   1.88*   --   1.76*  1.63*  1.64*   --   1.66*   < >  1.66*   < >  1.74*   < >  2.75*   GFRESTIMATED  26*   --   38*   --   41*  45*  44*   --   44*   < >  44*   < >  41*   < >  24*   GLC  382*   --   226*   --   237*  184*  186*   --   81   < >  128*   < >  187*   < >  139*   A1C   --    --    --    --    --    --    --    --    --    --    --    --    --    --   Canceled, Test credited   Below Assay Range  NOTIFIED LEONARD ONEILL AT 0538 ON 7/6/17 BY CHRISSY     JACOB  7.6*   --   7.8*   --   7.6*  8.1*  7.8*   --   8.2*   < >  7.8*   < >  8.0*   < >  6.9*   PHOS   --    --    --    --    --    --    --    --    --    --   3.8   --   3.7   < >  5.5*   MAG   --    --    --    --    --    --    --    --    --    --   2.4*   --   2.2   < >  2.1   LACT   --   1.2   --   1.0   --    --    --   0.7   --    < >   --    --    --    < >  1.5    < > = values in this interval not displayed.       Hepatic Studies    Recent Labs    Lab Test  07/25/17   0945  07/25/17   0017  07/24/17   0705  07/23/17   0625  07/21/17   0615  07/19/17   0621  07/18/17   0624   07/11/17   0328   07/05/17   1810   BILITOTAL  0.5   --   0.4  0.3  0.5  0.7  0.6   < >  0.5   < >   --    ALKPHOS  317*   --   264*  224*  297*  201*  171*   < >  158*   < >   --    ALBUMIN  1.9*   --   1.7*  1.9*  1.9*  2.0*  1.9*   < >  1.8*   < >   --    AST  90*   --   86*  63*  75*  60*  88*   < >  34   < >   --    ALT  60   --   54  47  51  50  61   < >  27   < >   --    LDH   --   258*   --    --    --    --    --    --    --    --   289*   GGT   --    --    --    --    --    --    --    --   58   --    --     < > = values in this interval not displayed.       Pancreatitis testing    Recent Labs   Lab Test  07/24/17   0705  07/17/17   0630  07/12/17   0342  07/10/17   0340  07/05/17   0346  03/05/17   0358  03/03/17   1458  02/21/17   1520  12/09/16   1159  02/08/16   1144   09/30/10   1734   AMYLASE   --    --    --    --    --   53  70   --    --    --    --   82   LIPASE   --    --    --    --    --   216   --   326  161   --    --   138   TRIG  303*  168*  114  177*  174*   --    --    --    --   99   < >   --     < > = values in this interval not displayed.       Hematology Studies      Recent Labs   Lab Test  07/25/17   0945  07/24/17   0705  07/23/17   0625  07/22/17   0638  07/21/17   0615  07/20/17   0645   WBC  11.9*  6.4  4.9  4.5  3.9*  4.6   ANEU  10.8*  3.7  2.3  2.3  2.2  2.7   ALYM  0.4*  2.3  2.1  1.8  1.2  1.5   ZINA  0.3  0.3  0.3  0.4  0.3  0.4   AEOS  0.1  0.1  0.1  0.1  0.1  0.1   HGB  8.8*  7.8*  6.9*  7.4*  7.0*  7.4*   HCT  27.6*  24.8*  22.6*  24.1*  22.8*  23.4*   PLT  168  126*  120*  136*  118*  127*       Arterial Blood Gas Testing    Recent Labs   Lab Test  07/25/17   1037  07/08/17   0902  07/06/17   2334  07/06/17   0015  07/05/17 2026   PH  7.14*  7.32*  7.26*  7.27*  7.27*   PCO2  69*  35  37  35  35   PO2  103  96  85  109*  155*   HCO3  23   18*  17*  16*  16*   O2PER  7L  30  30.0  30.0  40        Urine Studies     Recent Labs   Lab Test  07/19/17   1150  07/11/17   1105  07/06/17   1441  06/06/17   1605  03/24/17   1315   URINEPH  5.0  5.0  5.0  5.0  5.5   NITRITE  Negative  Negative  Negative  Negative  Negative   LEUKEST  Negative  Negative  Trace*  Negative  Negative   WBCU  2  <1  9*  1  1       Vancomycin Levels     Recent Labs   Lab Test  07/06/17   0316  10/28/16   1405   VANCOMYCIN  22.1  14.4       Tacrolimus levels    Invalid input(s): TACROLIMUS, TAC, TACR  Transplant Immunosuppression Labs Latest Ref Rng & Units 7/25/2017 7/24/2017 7/23/2017 7/22/2017 7/22/2017   Tacro Level 5.0 - 15.0 ug/L - - 4.8(L) 3.8(L) -   Tacro Level - - - Not Provided Not Provided -   Creat 0.66 - 1.25 mg/dL 2.60(H) 1.88(H) 1.76(H) - 1.63(H)   BUN 7 - 30 mg/dL 77(H) 65(H) 62(H) - 59(H)   WBC 4.0 - 11.0 10e9/L 11.9(H) 6.4 4.9 - 4.5   Neutrophil % 91.0 58.2 47.9 - 50.3   ANEU 1.6 - 8.3 10e9/L 10.8(H) 3.7 2.3 - 2.3       CSF testing     Recent Labs   Lab Test  06/11/09   0225   CWBC  1   CRBC  2   CGLU  104*   CTP  54         Microbiology:  Ascites  7/1714/2017 negative to date.     7/5/2017 S acpitis    BAL   7/15/2017 yeast and Rhinovirus     7/12/2017 single colony of VRE, C albicans/dubliniensis   Sputum  7/11/2017 VRE and Coag Neg Staph     Bcx   7/15/2017 negative to date.     Last check of C difficile  C Diff Toxin B PCR   Date Value Ref Range Status   10/27/2016  NEG Final    Negative  Negative: Clostridium difficile target DNA sequences NOT detected, presumed   negative for Clostridium difficile toxin B or the number of bacteria present   may be below the limit of detection for the test.   FDA approved assay performed using [employer] real-time PCR.   A negative result does not exclude actual disease due to Clostridium difficile   and may be due to improper collection, handling and storage of the specimen or   the number of organisms in the specimen is  below the detection limit of the   assay.         Virology:  CMV viral loads    Recent Labs   Lab Test  07/20/17   1427  07/18/17   0530  07/15/17   1553  07/10/17   0340  07/03/17   1032   CSPEC  Plasma  Plasma  Bronchoalveolar Lavage  EDTA PLASMA  Plasma, EDTA anticoagulant   CMVLOG  3.3*  3.0*  3.6*  2.2*  <2.1       CMV viral loads    Log IU/mL of CMVQNT   Date Value Ref Range Status   07/20/2017 3.3 (H) <2.1 [Log_IU]/mL Final   07/18/2017 3.0 (H) <2.1 [Log_IU]/mL Final   07/15/2017 3.6 (H) <2.1 [Log_IU]/mL Final   07/10/2017 2.2 (H) <2.1 [Log_IU]/mL Final   07/03/2017 <2.1 <2.1 [Log_IU]/mL Final   06/26/2017 Not Calculated <2.1 [Log_IU]/mL Final       CMV resistance testing  No lab results found.  No results found for: CMVCID, CMVFOS, CMVGAN    USCNS Invalid input(s): USCN, USCNS    USCNS No components found for: USCN, USCNS      No results found for: H6RES    EBV DNA Copies/mL   Date Value Ref Range Status   07/18/2017 225625 (A) EBVNEG [Copies]/mL Final         Pathology   Cytology of ascitic fluid 7/14/2017.   Peritoneal fluid, ascites:   - Negative for malignancy   - Acute and chronic inflammation present   Specimen Adequacy: Satisfactory for evaluation.   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   INTERPRETATION:   Ascites fluid:        Rare to absent viable B cells     COMMENT:   This specimen was collected on 7/14/17 but was not ordered/delivered to   lab/run until 7/18/17 - results should be interpreted with caution due   to low cellularity/viability.     Due to limited cellularity we were only able to analyze the B cells.   There is no immunophenotypic evidence of B cell non-Hodgkin lymphoma.   Neoplastic cells, including large cells, may not survive specimen   processing. This sample may not be representative. Final interpretation   requires correlation with morphologic and clinical features.       Imaging:  MRI brain 7/20/2017.   Impression:  1. Significant image degradation due to motion artifact,  with no  definite acute intracranial pathology. No abnormal contrast enhancing  intracranial lesions.  2. Mucosal thickening in the sphenoid and maxillary sinuses and left  greater than right mastoid fluid are nonspecific in the setting of  recent intubation.    CT c/a/p 7/13/2017 Reviewed by myself.   IMPRESSION:   1. Improved moderate right-sided pleural effusion and resolution of  left-sided pleural effusion. There remain large areas of  atelectasis/consolidation in both lungs, including in the left lower  lobe despite resolution of left-sided pleural effusion. Superimposed  infectious process cannot be excluded.  2. Moderate-large amount of ascites increased from 7/8/2017, which  makes it difficult to evaluate for the presence or absence of  inflammatory change or infectious process in the abdomen or pelvis. No  intra-abdominal abscess.  3. Stable small presumed hematoma within the ventral abdominal wall  fat.  4. Splenomegaly.        Naseem Figueroa  Pager: [phone]

## 2017-07-25 NOTE — CONSULTS
Nephrology Initial Consult  July 25, 2017      Camacho Bhagat MRN:[id number] YOB: 1965  Date of Admission:7/4/2017  Primary care provider: Shay Kirkpatrick  Requesting physician: Tip Zhang MD    ASSESSMENT AND RECOMMENDATIONS:   Camacho Bhagat is a 51 year old PMH of HTN , DMII, CASTAÑEDA cirrhosis SP OLT 03/2017 who was admitted with neutropenia and colitis , had Ex Lap 7/4, and was transferred out of ICU on Abx for the infection on 7/17 , transferred to ICU due to AMS and hypercarbic resp failure today 7/25. Intubated , received Paracentesis 5L removed. On albumin post procedure. Nephrology consulted for EJ and anuria    1. EJ oliguric likely sec to high intraabdominal pressures and sepsis from the ? Colon perf ? Shock state with hypotension    Will recommend  Volume expansion with albumin 5 % and IVF   Clinically intra-vascular depleted with ? Sepsis  ID following for ABx -- broad spectrum coverage  He needs volume resuscitation at this time, will follow closely if needed for CRRT.  Follow on BMP Q4hr for electrolytes    Multifactorial cause of EJ --   High abdominal pressures to 24 and sepsis from the intra-abdominal pathology.   SIRS/ sepsis/ shock state -- follow on the abdominal fluid labs, cell counts and cytology  Intra-abdominal pressures improved to 10 from 24 after paracentesis, will monitor renal functions    2. Lactic acidosis  Likely from the intra-abdominal pathology ,     3. Acid base status   Acute resp acidosis  PH 7.14  will need to be followed since it may be deteriorate     4. Electrolytes within acceptable range    5. SP liver transplant  On tacrolimus ,   Meropenem, micafungin and daptomycin  ID following as well    Recommendations were communicated to primary team directly    Discussed with Dr. Jones will be seen in morning by staff    Balaji Barraza MD   [phone]      REASON FOR CONSULT: EJ and anuria    HISTORY OF PRESENT ILLNESS:  Camacho Bhagat is a 51 year old PMH of  HTN , DMII, CASTAÑEDA cirrhosis SP OLT 03/2017 who was admitted with neutropenia and colitis , had Ex Lap 7/4, he had EJ at that time was non oliguric , he improved his creatinine to baseline 1.5-1.6 after that. He had been on vanco zosyn and flagyl with micafungin , he has completed a course of Ertapenem 7/18.  He was transferred to ICU after hypercarbic resp failure and was intubated for the acute resp failure. He was also found to have tense ascities and received a paracentesis with 5 L fluid removed , since the fluid was turbid and cloudy he went for a CT abdomen and was found to have retroperitoneal air on the scan. Final read pending  He had a colonoscopy 4 days back with full thickness biopsy.   Creatinine has been slowly increasing to 1.6 -->1.8 --> 2.6 today  UO has rapidly declined in the last 24hrs as well.    Colonoscopy with full thickness Bx 7/24    PAST MEDICAL HISTORY:  Reviewed chart 07/25/2017     Past Medical History:   Diagnosis Date     Cancer (H)      Depressive disorder, not elsewhere classified      Esophageal reflux      Fibromyalgia 1/2009    dx with Dr Benitez( Rhe[facility])     History of thrombophlebitis      Osteoarthritis      Other acute embolism veins 11/01    Deep vein thrombophlebitis, filter placed     Other and unspecified hyperlipidemia      Other chronic nonalcoholic liver disease     Fatty liver      Other testicular hypofunction      Pneumonia, organism 10-01    Included ARDS, sepsis, and  acute renal failure; hospitalized     Rheumatoid arthritis(714.0)      Type II or unspecified type diabetes mellitus without mention of complication, not stated as uncontrolled 11/01    Managed by endocrinology     Unspecified essential hypertension 11/01    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     Unspecified sleep apnea     Uses BiPAP       Past Surgical History:   Procedure Laterality Date     BENCH LIVER N/A 3/4/2017    Procedure: BENCH LIVER;  Surgeon: Jovan Tran MD;  Location:  [facility]     C NONSPECIFIC PROCEDURE      tracheostomy     C NONSPECIFIC PROCEDURE      repair of deviated septum     C NONSPECIFIC PROCEDURE      Rt knee arthroscopy     C TOTAL KNEE ARTHROPLASTY  2008    Right knee arthroscopy     CHOLECYSTECTOMY       COLONOSCOPY N/A 2017    Procedure: COMBINED COLONOSCOPY, SINGLE OR MULTIPLE BIOPSY/POLYPECTOMY BY BIOPSY;  Colonoscopy;  Surgeon: Izaiah Montes MD;  Location: [facility]     ESOPHAGOSCOPY, GASTROSCOPY, DUODENOSCOPY (EGD), COMBINED N/A 2016    Procedure: COMBINED ESOPHAGOSCOPY, GASTROSCOPY, DUODENOSCOPY (EGD), BIOPSY SINGLE OR MULTIPLE;  Surgeon: Trent Pederson MD;  Location: [facility]     LAPAROTOMY EXPLORATORY N/A 2017    Procedure: LAPAROTOMY EXPLORATORY;  Exploratory Laparotomy, washout;  Surgeon: Tip Zhang MD;  Location: [facility]     LAPAROTOMY EXPLORATORY N/A 2017    Procedure: LAPAROTOMY EXPLORATORY;  Exploratory Laparotomy, Washout with closure.;  Surgeon: Tip Zhang MD;  Location: [facility]     TRANSPLANT LIVER RECIPIENT  DONOR N/A 3/4/2017    Procedure: TRANSPLANT LIVER RECIPIENT  DONOR;  Surgeon: Jovan Tran MD;  Location: [facility]        MEDICATIONS:  PTA Meds  Prior to Admission medications    Medication Sig Last Dose Taking? Auth Provider   gabapentin (NEURONTIN) 300 MG capsule Take 1 capsule (300 mg) by mouth 3 times daily   Toñito Camejo MD   amLODIPine (NORVASC) 5 MG tablet Take 1 tablet (5 mg) by mouth daily   Toñito Camejo MD   tacrolimus (PROGRAF - GENERIC EQUIVALENT) 1 MG capsule Take 4capsules (4mg) in the morning and 3 capsules (3 mg) in the evening. Take every 12 hours.   Toñito Camejo MD   mycophenolate (CELLCEPT - GENERIC EQUIVALENT) 250 MG capsule Take 1 capsule (250 mg) by mouth every 12 hours   Toñito Camejo MD   sulfamethoxazole-trimethoprim (BACTRIM/SEPTRA) 400-80 MG per tablet Take 1 tablet on Monday and take 1 tablet on Thursday   Toñito Camejo MD   fludrocortisone (FLORINEF)  0.1 MG tablet Take 1 tablet (0.1 mg) by mouth daily For elevated potassium   Toñito Camejo MD   oxyCODONE (ROXICODONE) 10 MG IR tablet Take 1 tablet (10 mg) by mouth daily as needed for moderate to severe pain   Shay Kirkpatrick MD   clotrimazole (MYCELEX) 10 MG LOZG lozenge Place 1 lozenge (1 Beatrice) inside cheek 4 times daily   Toñito Camejo MD   Linagliptin (TRADJENTA PO) Take 5 mg by mouth   Reported, Patient   tadalafil (CIALIS) 5 MG tablet Take 1 tablet (5 mg) by mouth daily Never use with nitroglycerin, terazosin or doxazosin.   Toñito Rivas MD   tadalafil (CIALIS) 5 MG tablet Take 1 tablet (5 mg) by mouth daily Never use with nitroglycerin, terazosin or doxazosin.   Toñito Rivas MD   cyclobenzaprine (FLEXERIL) 10 MG tablet Take 1 tablet (10 mg) by mouth 3 times daily as needed for muscle spasms   Toñito Camejo MD   omeprazole (PRILOSEC) 20 MG CR capsule Take 1 capsule (20 mg) by mouth 2 times daily (before meals)   Toñito Camejo MD   magnesium oxide (MAG-OX) 400 MG tablet Take 1 tablet (400 mg) by mouth 2 times daily   Adonay Cross MD   lidocaine (LIDODERM) 5 % Patch Place 1 patch onto the skin every 24 hours  Patient not taking: Reported on 6/7/2017   Dora Elizabeth NP   LACTOBACILLUS EXTRA STRENGTH CAPS Take 1 capsule by mouth 2 times daily   Dora Elizabeth NP   prochlorperazine (COMPAZINE) 5 MG tablet Take 1-2 tablets (5-10 mg) by mouth every 6 hours as needed for nausea or vomiting  Patient not taking: Reported on 6/7/2017   Dora Elizabeth NP   oxyCODONE (ROXICODONE) 5 MG IR tablet Take 1-2 tablets (5-10 mg) by mouth every 4 hours as needed for moderate to severe pain   Ada Driver PA-C   aspirin 325 MG tablet 1 tablet (325 mg) by Oral or Feeding Tube route daily   Ada Driver PA-C   insulin aspart (NOVOLOG PEN) 100 UNIT/ML injection DOSE:  1 units per 8 grams of carbohydrate. With meals and snacks. Only chart total amount of units given.  Do not  give if pre-prandial glucose is less than 60 mg/dL.   Ada Driver PA-C   insulin glargine (LANTUS) 100 UNIT/ML injection Inject 45 Units Subcutaneous every 24 hours  Patient taking differently: Inject 50 Units Subcutaneous every 24 hours    Ada Driver PA-C   valGANciclovir (VALCYTE) 450 MG tablet Take 1 tablet (450 mg) by mouth daily   Ada Driver PA-C   multivitamin, therapeutic with minerals (THERA-VIT-M) TABS tablet Take 1 tablet by mouth daily   Ada Driver PA-C   ondansetron (ZOFRAN-ODT) 4 MG ODT tab Take 1 tablet (4 mg) by mouth every 8 hours as needed for nausea   Ada Driver PA-C   glucagon 1 MG SOLR injection Inject 1 mg Subcutaneous every 15 minutes as needed for low blood sugar (May repeat x 1 only)   Godwin Willson MD      Current Meds    meropenem  1 g Intravenous Q12H     DAPTOmycin (CUBICIN) intermittent infusion  8 mg/kg (Dosing Weight) Intravenous Q24H     micafungin  100 mg Intravenous Q24H     ganciclovir (CYTOVENE) intermittent infusion  5 mg/kg (Dosing Weight) Intravenous Q12H     albumin human  25 g Intravenous Q6H     protein modular  1 packet Per Feeding Tube BID     fludrocortisone  100 mcg Oral BID     aspirin  80 mg Oral Daily     insulin aspart   Subcutaneous TID w/meals     multivitamins with minerals  15 mL Per Feeding Tube Daily     heparin  5,000 Units Subcutaneous Q8H     pantoprazole  40 mg Oral or Feeding Tube Daily     sodium chloride (PF)  3 mL Intracatheter Q8H     Infusion Meds    propofol (DIPRIVAN) infusion 60 mcg/kg/min (07/25/17 1415)     IV fluid REPLACEMENT ONLY       insulin (regular) 5 Units/hr (07/25/17 1400)     NaCl 10 mL/hr at 07/25/17 1328     IV fluid REPLACEMENT ONLY 25 mL/hr at 07/20/17 0430       ALLERGIES:    Allergies   Allergen Reactions     Erythromycin GI Disturbance     Vioxx      Nausea, vomiting       REVIEW OF SYSTEMS:  Unable to obtain    SOCIAL HISTORY:   Social History     Social History     Marital status:       Spouse name: N/A     Number of children: 1     Years of education: N/A     Occupational History            Social History Main Topics     Smoking status: Former Smoker     Smokeless tobacco: Former User     Types: Chew     Quit date: 10/8/2015      Comment: Has used chewing tobacco since age 15 , chewed for 20yrs      Alcohol use No      Comment: last drink about a year ago (quit )     Drug use: No     Sexual activity: Yes     Partners: Female     Other Topics Concern     Not on file     Social History Narrative    uSED TO BE      Back in school now         Reviewed in charts      FAMILY MEDICAL HISTORY:   Family History   Problem Relation Age of Onset     DIABETES Father      Hypertension Father      Substance Abuse Father      Arthritis Mother      CANCER Mother      Thyroid     Thyroid Disease Mother      Other Cancer Mother      Colon Cancer No family hx of      Hyperlipidemia No family hx of      Coronary Artery Disease No family hx of      CEREBROVASCULAR DISEASE No family hx of      Breast Cancer No family hx of      Prostate Cancer No family hx of      Reviewed in charts    PHYSICAL EXAM:   Temp  Av.4  F (37.4  C)  Min: 97  F (36.1  C)  Max: 103.3  F (39.6  C)  Arterial Line MAP (mmHg)  Av.6 mmHg  Min: 39 mmHg  Max: 281 mmHg  Arterial Line BP  Min: 41/40  Max: 284/281      Pulse  Av  Min: 84  Max: 109 Resp  Av.3  Min: 10  Max: 45  SpO2  Av.4 %  Min: 84 %  Max: 100 %  FiO2 (%)  Av.6 %  Min: 30 %  Max: 100 %    CVP (mmHg): 6 mmHg  /62  Pulse 102  Temp 102.9  F (39.4  C) (Axillary)  Resp 19  Ht 1.829 m (6')  Wt 112 kg (246 lb 14.4 oz)  SpO2 100%  BMI 33.49 kg/m2   Date 17 0700 - 17 0659   Shift 6282-7651 7651-5929 1166-8661 24 Hour Total   I  N  T  A  K  E   I.V. 160 10  170    NG/GT 90 600  690    Colloid 50   50    Enteral 115   115    Shift Total  (mL/kg) 415  (3.71) 610  (5.45)  1025  (9.15)    O  U  T  P  U  T   Urine 10 50  60    Drains 5000   5000    Shift Total  (mL/kg) 5010  (44.74) 50  (0.45)  5060  (45.18)   Weight (kg) 111.99 111.99 111.99 111.99        Admit Weight: 94.2 kg (207 lb 11.2 oz) (SCALE 2)     GENERAL APPEARANCE: no acute distress, sedated and vented  EYES: + scleral icterus, pupils equal  HENT: NC/AT,  mouth  without ulcers or lesions  Lymphatics: no cervical or supraclavicular LAD  Pulmonary: lungs clear to auscultation with equal breath sounds bilaterally, no clubbing  CV: regular rhythm, normal rate, no rub   - JVP -   - Edema -  GI: distended , firm and tympanitic , BS-  MS: no evidence of inflammation in joints, no muscle tenderness  : no Crowe, - scrotal or labial edema  SKIN: no rash, warm, dry, no cyanosis  NEURO: mentation intact and speech normal    LABS:   CMP  Recent Labs  Lab 07/25/17  0945 07/24/17  0705 07/23/17  0625 07/22/17  0638  07/21/17  0615    142 144 147*  < > 150*   POTASSIUM 4.0 3.8 3.8 3.8  < > 4.9   CHLORIDE 104 112* 112* 116*  < > 117*   CO2 26 24 23 23  < > 25   ANIONGAP 7 6 9 7  < > 8   * 226* 237* 184*  < > 81   BUN 77* 65* 62* 59*  < > 70*   CR 2.60* 1.88* 1.76* 1.63*  < > 1.66*   GFRESTIMATED 26* 38* 41* 45*  < > 44*   GFRESTBLACK 31* 46* 49* 54*  < > 53*   JACOB 7.6* 7.8* 7.6* 8.1*  < > 8.2*   PROTTOTAL 6.6* 6.0* 6.0*  --   --  5.7*   ALBUMIN 1.9* 1.7* 1.9*  --   --  1.9*   BILITOTAL 0.5 0.4 0.3  --   --  0.5   ALKPHOS 317* 264* 224*  --   --  297*   AST 90* 86* 63*  --   --  75*   ALT 60 54 47  --   --  51   < > = values in this interval not displayed.  CBC  Recent Labs  Lab 07/25/17  1651 07/25/17  0945 07/24/17  0705 07/23/17  0625   HGB 7.5* 8.8* 7.8* 6.9*   WBC 10.3 11.9* 6.4 4.9   RBC 2.71* 3.17* 2.81* 2.52*   HCT 23.4* 27.6* 24.8* 22.6*   MCV 86 87 88 90   MCH 27.7 27.8 27.8 27.4   MCHC 32.1 31.9 31.5 30.5*   RDW 16.3* 16.4* 16.0* 15.9*   * 168 126* 120*     INR  Recent Labs  Lab 07/25/17  1651 07/24/17  0705  07/21/17  1230   INR 1.23* 1.11 1.24*   PTT 41*  --   --      ABG  Recent Labs  Lab 07/25/17  1037   PH 7.14*   PCO2 69*   PO2 103   HCO3 23   O2PER 7L      URINE STUDIES  Recent Labs   Lab Test  07/25/17   1205  07/19/17   1150  07/11/17   1105  07/06/17   1441   COLOR  Dark Yellow  Yellow  Yellow  Yellow   APPEARANCE  Cloudy  Slightly Cloudy  Clear  Cloudy   URINEGLC  70*  Negative  Negative  Negative   URINEBILI  Negative  Negative  Negative  Negative   URINEKETONE  Negative  Negative  Negative  Negative   SG  1.014  1.009  1.009  1.014   UBLD  Moderate*  Moderate*  Negative  Moderate*   URINEPH  5.0  5.0  5.0  5.0   PROTEIN  100*  10*  10*  30*   NITRITE  Negative  Negative  Negative  Negative   LEUKEST  Trace*  Negative  Negative  Trace*   RBCU  >182*  6*  <1  >182*   WBCU  5*  2  <1  9*     Recent Labs   Lab Test  06/14/17   1508  04/11/16   1345  02/08/16   1234  04/11/11   1201   [facility]  1.55*  0.41*  0.33*  <0.08     PTH  No lab results found.  IRON STUDIES  Recent Labs   Lab Test  02/08/16   1144   IRON  93   FEB  230*   IRONSAT  41       IMAGING:  CT scan abdomen reviewed   Has retroperitoneal air likely at the site of his colitis   Read pending    Balaji Barraza MD

## 2017-07-25 NOTE — PROCEDURES
Procedure Report  7/25/2017    Pre-operative Diagnosis: Abdominal compartment syndrome, ascites   Post-operative Diagnosis: Same  Procedure: Ultrasound guided large volume paracentesis   Staff: Trini   Resident: Efrem Santoyo MS#[id number]   EBL: 0  Replacement: 25g of 25%albumin   Anesthesia: 1% lidocaine (2ml), propofol   Findings: Turbid fluid return, 5L, drained via LLQ. Bladder pressures dropped from 22cm to 11cm of H2O.   Specimen: Aerobic/anaerobic/fungal cultures, fluid chemistries, cytology   Complication: None immediate   Tubes/Drains: None   Disposition: SICU    [facility] 3426

## 2017-07-25 NOTE — PLAN OF CARE
Problem: Goal Outcome Summary  Goal: Goal Outcome Summary  OT/7A:  Cancel - pt not medically appropriate for OT this date.  Will reschedule.

## 2017-07-25 NOTE — CONSULTS
SURGICAL ICU ADMISSION NOTE  July 25, 2017      PRIMARY TEAM: Transplant surgery  PRIMARY PHYSICIAN: Dr. Tran    REASON FOR CRITICAL CARE ADMISSION: Respiratory failure   ADMITTING PHYSICIAN: Dr. 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  Patient was admitted 7/4/2017 with abdominal pain and fever. He was found to have neutropenic colitis without perforation on ex-lap 7/4. Closure on 7/5. Started on Zosyn, Vancomycin, Flagyl, Micafungin on admission (7/4/2017). 7/5 Blood cultures, no growth thus far. Abdominal fluid culture collected intraoperatively, gram stain with no organisms, fungal and bacterial culture, GPCs. Bronchoalveolar lavage growing VRE, colonization. Patient completed 14 day course of ertapenem on 7/18.Was transferred out of the ICU on 7/17.  Patient was transferred back to the ICU due to altered mental status and worsening respiration. Patient was hypercarbic, unable to be resolved by Narcan. He was intubated due to acute respiratory failure in the ICU.      Review of systems: Unable to obtain 2/2 sedation/intubation    PAST MEDICAL HISTORY:   has a past medical history of Cancer (H); Depressive disorder, not elsewhere classified; Esophageal reflux; Fibromyalgia (1/2009); History of thrombophlebitis; Osteoarthritis; Other acute embolism veins (11/01); Other and unspecified hyperlipidemia; Other chronic nonalcoholic liver disease; Other testicular hypofunction; Pneumonia, organism (10-01); Rheumatoid arthritis(714.0); Type II or unspecified type diabetes mellitus without mention of complication, not stated as uncontrolled (11/01); Unspecified essential hypertension (11/01); and Unspecified sleep apnea.    PAST SURGICAL HISTORY:   has a past surgical history that includes NONSPECIFIC PROCEDURE; NONSPECIFIC PROCEDURE; NONSPECIFIC PROCEDURE (2007); TOTAL KNEE  ARTHROPLASTY (); Cholecystectomy; Esophagoscopy, gastroscopy, duodenoscopy (EGD), combined (N/A, 2016); Transplant liver recipient  donor (N/A, 3/4/2017); Bench liver (N/A, 3/4/2017); Laparotomy exploratory (N/A, 2017); Laparotomy exploratory (N/A, 2017); and Colonoscopy (N/A, 2017).    FAMILY HISTORY:  family history includes Arthritis in his mother; CANCER in his mother; DIABETES in his father; Hypertension in his father; Other Cancer in his mother; Substance Abuse in his father; Thyroid Disease in his mother. There is no history of Colon Cancer, Hyperlipidemia, Coronary Artery Disease, CEREBROVASCULAR DISEASE, Breast Cancer, or Prostate Cancer.    SOCIAL HISTORY:  - past tobacco use, quit 20 months prior  - denies alcohol use  - denies illicit drug use    ALLERGIES:  Allergies   Allergen Reactions     Erythromycin GI Disturbance     Vioxx      Nausea, vomiting       MEDICATIONS:    Current Facility-Administered Medications:      meropenem (MERREM) 1 g vial to attach to  mL bag, 1 g, Intravenous, Q12H, Ada Driver PA-C, 1 g at 17 1413     DAPTOmycin (CUBICIN) 750 mg in NaCl 0.9 % 100 mL intermittent infusion, 8 mg/kg (Dosing Weight), Intravenous, Q24H, Ada Driver PA-C     micafungin (MYCAMINE) 100 mg in NaCl 0.9 % 100 mL intermittent infusion, 100 mg, Intravenous, Q24H, Ada Driver PA-C, 100 mg at 17 1305     naloxone (NARCAN) injection 0.1-0.4 mg, 0.1-0.4 mg, Intravenous, Q2 Min PRN, Roxi Santoyo MD     ipratropium - albuterol 0.5 mg/2.5 mg/3 mL (DUONEB) neb solution 3 mL, 3 mL, Nebulization, Q4H PRN, Roxi Santoyo MD     propofol (DIPRIVAN) infusion, 5-75 mcg/kg/min (Dosing Weight), Intravenous, Continuous, Last Rate: 33.9 mL/hr at 17 1415, 60 mcg/kg/min at 17 1415 **AND** propofol (DIPRIVAN) injection 10 mg/mL vial, 10-20 mg, Intravenous, Q30 Min PRN, 50 mg at 17 1226 **AND** CK total, , , CONDITIONAL (SPECIFY) **AND**  Triglycerides, , , CONDITIONAL (SPECIFY), Roxi Santoyo MD     ganciclovir (CYTOVENE) 450 mg in D5W 100 mL intermittent infusion CYTOTOXIC, 5 mg/kg (Dosing Weight), Intravenous, Q12H, Roxi Santoyo MD     dextrose 10 % 1,000 mL infusion, , Intravenous, Continuous PRN, Roxi Santoyo MD     insulin 1 unit/mL in saline (NovoLIN, HumuLIN Regular) drip - ADULT IV Infusion, 0-24 Units/hr, Intravenous, Continuous, Roxi Santoyo MD, Last Rate: 5 mL/hr at 07/25/17 1400, 5 Units/hr at 07/25/17 1400     glucose 40 % gel 15-30 g, 15-30 g, Oral, Q15 Min PRN **OR** dextrose 50 % injection 25-50 mL, 25-50 mL, Intravenous, Q15 Min PRN **OR** glucagon injection 1 mg, 1 mg, Subcutaneous, Q15 Min PRN, Roxi Santoyo MD     HOLD MEDICATION, , Does not apply, HOLD, Ada Driver PA-C     tacrolimus (PROGRAF - GENERIC EQUIVALENT) suspension 8 mg, 8 mg, Oral, HOLD, Tip Zhang MD     albumin human 25 % injection 25 g, 25 g, Intravenous, Q6H, Roxi Santoyo MD     protein modular (PROSource TF) 1 packet, 1 packet, Per Feeding Tube, BID, Arden Page MD     fludrocortisone (FLORINEF) tablet 100 mcg, 100 mcg, Oral, BID, Luiza Wing PA-C, 100 mcg at 07/25/17 0823     aspirin suspension 80 mg, 80 mg, Oral, Daily, Tip Zhang MD, 80 mg at 07/25/17 0823     insulin aspart (NovoLOG) inj (RAPID ACTING), , Subcutaneous, TID w/meals, Alesha Sauer APRN CNP, 1 Units at 07/24/17 1756     insulin aspart (NovoLOG) inj (RAPID ACTING), , Subcutaneous, With Snacks or Supplements, Alesha Sauer APRN CNP     HYDROmorphone (PF) (DILAUDID) injection 0.3-0.5 mg, 0.3-0.5 mg, Intravenous, Q2H PRN, Makenna Cordova APRN CNP     hydrALAZINE (APRESOLINE) injection 20 mg, 20 mg, Intravenous, Q6H PRN, Makenna Cordova APRN CNP     0.9% sodium chloride infusion, , Intravenous, Continuous, Roxi Santoyo MD, Last Rate: 10 mL/hr at 07/25/17 1328     senna-docusate (SENOKOT-S;PERICOLACE) 8.6-50 MG per  tablet 2 tablet, 2 tablet, Oral, BID PRN, Lisa Chambers APRN CNP     polyethylene glycol (MIRALAX/GLYCOLAX) Packet 17 g, 17 g, Oral, Daily PRN, Lisa Chambers APRN CNP     multivitamins with minerals (CERTAVITE/CEROVITE) liquid 15 mL, 15 mL, Per Feeding Tube, Daily, Qi, Arden Garcias MD, 15 mL at 07/25/17 0823     heparin sodium PF injection 5,000 Units, 5,000 Units, Subcutaneous, Q8H, Johnson Hobbs MD, 5,000 Units at 07/25/17 1424     dextrose 10 % 1,000 mL infusion, , Intravenous, Continuous PRN, Qi, Arden Garcias MD, Last Rate: 25 mL/hr at 07/20/17 0430     pantoprazole (PROTONIX) suspension 40 mg, 40 mg, Oral or Feeding Tube, Daily, Roxi Santoyo MD, 40 mg at 07/25/17 0823     sodium chloride (PF) 0.9% PF flush 3 mL, 3 mL, Intracatheter, Q1H PRN, Eric Dacosta MD, 3 mL at 07/07/17 0253     sodium chloride (PF) 0.9% PF flush 3 mL, 3 mL, Intracatheter, Q8H, Eric Dacosta MD, 3 mL at 07/24/17 2015     bisacodyl (DULCOLAX) Suppository 10 mg, 10 mg, Rectal, Daily PRN, Johnson Hobbs MD, 10 mg at 07/09/17 1027     acetaminophen (TYLENOL) tablet 650 mg, 650 mg, Oral, Q4H PRN, 650 mg at 07/25/17 0120 **OR** acetaminophen (TYLENOL) Suppository 650 mg, 650 mg, Rectal, Q4H PRN, Johnson Hobbs MD     potassium chloride SA (K-DUR/KLOR-CON M) CR tablet 20-40 mEq, 20-40 mEq, Oral, Q2H PRN, Johnson Hobbs MD     potassium chloride (KLOR-CON) Packet 20-40 mEq, 20-40 mEq, Oral or Feeding Tube, Q2H PRN, Johnson Hobbs MD, 20 mEq at 07/23/17 0834     potassium chloride 10 mEq in 100 mL intermittent infusion, 10 mEq, Intravenous, Q1H PRN, Johnson Hobbs MD     potassium chloride 10 mEq in 100 mL intermittent infusion with 10 mg lidocaine, 10 mEq, Intravenous, Q1H PRN, Johnson Hobbs MD     potassium chloride 20 mEq in 50 mL intermittent infusion, 20 mEq, Intravenous, Q1H PRN, Johnson Hobbs MD, Last Rate: 50 mL/hr at 07/09/17 1025, 20 mEq at 07/09/17 1025     sodium phosphate 10 mmol in D5W intermittent infusion,  10 mmol, Intravenous, Daily PRN, Johnson Hobbs MD     sodium phosphate 15 mmol in D5W intermittent infusion, 15 mmol, Intravenous, Daily PRN, Johnson Hobbs MD, 15 mmol at 07/09/17 0851     sodium phosphate 20 mmol in D5W intermittent infusion, 20 mmol, Intravenous, Q6H PRN, Johnson Hobbs MD     sodium phosphate 25 mmol in D5W intermittent infusion, 25 mmol, Intravenous, Q8H PRN, Johnson Hobbs MD     magnesium sulfate 2 g in NS intermittent infusion (PharMEDium or FV Cmpd), 2 g, Intravenous, Daily PRN, Johnson Hobbs MD, 2 g at 07/04/17 1625     magnesium sulfate 4 g in 100 mL sterile water (premade), 4 g, Intravenous, Q4H PRN, Johnson Hobbs MD      I/O last 3 completed shifts:  In: 2955 [P.O.:780; I.V.:360; NG/GT:435]  Out: 1200 [Urine:1200]    PHYSICAL EXAMINATION:  Temp:  [97  F (36.1  C)-100.6  F (38.1  C)] 99  F (37.2  C)  Pulse:  [102] 102  Heart Rate:  [] 103  Resp:  [18-35] 35  BP: (101-150)/() 101/86  FiO2 (%):  [100 %] 100 %  SpO2:  [84 %-100 %] 100 %    Physical Exam  - Gen: adult male Intubated and sedated  - HEENT: MMM, ETT in place, OG with bilious output   - CVS: NR/RR, no murmurs noted.  - Pulm: CTA on right, ventilated. Wheezes and course breath sounds on the left.  - Abd: soft, non-tender, distended, no guarding  - MSK/Neuro:  - Ext: warm, well-perfused; peripheral edema 2+, distal pulses b/l. Dusky tips on the right index and right large toe   - Incision: C/D/I without surrounding erythema, drainage, or fluctuance; staples in place for midline incision. Suture in place in LLQ at prior paracentesis site c/d/i.    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     Summary of Events  7/4 negative ex-lap  7/5 washout and closure  7/9 pigtail catheter placed (removed 7/12)  7/12 Bronchoscopy  7/14 Paracentesis, negative SBP  7/17 Transfer to floor  7/25 Readmission to ICU due to respiratory failure    PLAN:    Neuro/ pain/ sedation:  - Monitor neurological status. Notify the MD for any acute changes in exam.  - Propofol 5-75 mcg/kg/min  - Neurology recommending no LP at this time    CV:   #Sinus tachycardia without hypotension   - No need for pressors at this time  - Continuous cardiac monitoring.   #History of Hypertension  - Hold PTA anti-hypertensives    Pulm:   #Acute respiratory failure  #Bilateral pleural effusions, pulmonary edema  - Ventilated at 16/500/8  - Repeat ABG  - P/S trials BID    FEN/ GI:   #Need for nutritional supplementation  - ICU electrolyte replacement protocol  - [place] - Nutrition consult for replacement  - Resume TF at continuous rate     Graft Function/Immunosuppression:  #s/p Liver transplant  - Repeat liver ultrasound, last 7/5 was normal  #Need for immunosuppression  - CellCept 250 mg BID: Held since 6/27/17 due to neutropenia. Continue to hold.   - Tac goal level 5-8. Since MMF held will aim for a goal closer to ~8. Prograf 8 mg BID, increased 7/23. 7/23 Level 4.8. Subtherapeutic despite frequent dose increases.   - Fludricortisone 100 mcg BID    Renal/fluids:  - Vazquez catheter to stay in place for strict intake and output monitoring  - TKO fluids  # EJ  # Intraabdominal hypertension with concern for compartment syndome  - Low urine output, vazquez to remain for strict I&O measurement, measure bladder pressures (decreased from 22 to  11 cm water after large volume paracentesis)  - Creatinine elevated to 2.6 from 1.6 baseline  - Crowe bladder pressure monitor  - No indications for dialysis at this time.  - Nephrology consult  - Monitor electrolytes    Endocrine:   #Diabetes mellitus T2  - Insulin gtt    ID/ Abx:  #Severe Sepsis, concern for abdominal source  #Fever  - paracentesis with 5L of yellow turbid fluid drained, sent for cultures and routine ascites labs  - UA, Ucx, blood cultures, chest x-ray  - CT chest, abdomen, and pelvis  - Meropenem, Daptomycin, Micafungin IV per IC   - transplant ID following, appreciates recs  - EBV PCR every other week.   #CMV viremia, +Donor/-recipient transplant  - CMV D+R-, low viral count. MMF held.   - Increase IV Ganciclovir 7/25/2017 per ID (started 07/20).   - Repeat CMV PCR 7/27/2017.     Heme: Hemoglobin stable. Will recheck in AM or earlier if there is clinical evidence of active bleeding.  - Daily CBC  # Right brachial arterial clot from art line  - Daily ASA  # Distal extremity ischemia secondary to pressors  - Monitor for signs of infection     Prophylaxis:    - DVT: SCDs, SubQ Heparin  - GI: Protonix     MSK:  - PT/OT    Lines/Drains:  -PIV x2, Crowe    Disposition: Surgical ICU    Patient seen, findings and plan discussed with ICU fellow Dr. Savage surgical ICU staff, Dr. Feliz.    Walter Topete, MS4    _______________________________________________    I saw and evaluated the patient independently, Agree with the edited note by MS4 Walter Santoyo MD  Surgical ICU Fellow  [phone]

## 2017-07-25 NOTE — PROCEDURES
Small Bowel Feeding Tube Placement Assessment  Reason for Feeding Tube Placement: Previous FT removed during bedside procedures  Cortrak Start Time:15:11   Cortrak End Time: 15:18  Medicine Delivered During Procedure: None (on Propofol gtt)  Placement Successful:  Presume post-pyloric (pending AXR confirmation).     Procedure Complications: None  Final Placement Alex at exit of nare: @ 104 cm (used 140 cm long FT)  Face to Face time with patient: 30 minutes    Janice Christianson ,RD, LD, CNSC (pgr [phone])

## 2017-07-25 NOTE — PROGRESS NOTES
Immunosuppression Note:    Camacho Bhagat is a 51 year old male who is seen today  for immunosuppression management     I, Jovan Tran MD, I have examined the patient with the resident/PA/Fellow, discussed and agree with the note and findings.  I have reviewed today's vital signs, medications, labs and imaging. I reviewed the immunosuppression medications and levels. 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         Graft Function: Good function. Alk phos 317. US liver in process.   Immunosuppression Management:   CellCept discontinued 6/27/17 due to neutropenia. .    Prograf 8 mg BID, increased 7/23. Goal ~8 due to single IS. 7/23 Level 4.8. Subtherapeutic despite frequent dose increases. HOLD for now due to EJ.   Cardiorespiratory: Acute respiratory distress with hypercapnia. Intubated today. Possible due to pulmonary edema and abdominal distension.   CXR-pulmonary edema, moderate pleural effusions. CT scan chest ordered.   Hematology: Pancytopenia, acute on chronic, secondary to medications (MMF, bactrim, valcyte). Improved with holding medications and neupogen. Possible due to CMV. CMV discordent with CMV PCR increasing. 7/20 CMV quant log 3.3. Started IV Ganciclovir 7/20 due to concern for CMV colitis, poor absorption (low tac level).  Continue to hold MMF and bactrim.   -Anemia- HGB 8.8. Blood transfusion 1 unit 7/18.   -Cytology ascites fluid, negative for malignancy   GI: Neutropenic colitis.   -Possible  CMV colitis. Colonsocopy- poor prep, normal mucosa. Biopsy results showing resolving injury secondary to possible drug induced vs infectious.   Paracentesis with 5L turbid fluid removal. Sent for analysis, cx.   Plan for CT scan abd/pelvis.    NJ removed by accident with intubation. Being replaced. Hold TF until CT reviewed.   Fluid/Electrolytes/Renal: EJ, secondary to hypoperfusion, sepsis initially. Now with EJ secondary to possible abd compartment syndrome. Avoid nephrotoxins.   Endocrine: DM II, on insulin gtt. Endocrine consulted for glycemic management.  Infectious disease: Acute abdominal distension. Paracentesis, send fluid for cx. CT scan chest, abd, pelvis. Bcx x 2, UC. Started on broad spectrum antibiotics due to concern for underlying intraabdominal infection. ID consulting.   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 IS reduced (MMF held). Started IV Ganciclovir on 07/20 as noted above.   Neuro: Delirium, slowly improving. Now with AMS due to possible infection.    Neurology consulted this admission- dx likely toxic metabolic encephalopathy, improving slowly. Video EEG showed mild to moderate encephalopathy.  In view of improving alertness (prior to this morning) Neurology recommending no LP at this time..   Pain: General discomfort, chronic back pain. Patient very restless. Lidoderm patch for back pain.  Oxycodone 5 mg PRN. Restart PTA flexeril qHS   Vascular: Right Arterial line clot 2/2 previous   art line. Vascular consulted. Recommend ASA only. Some evidence of microvascular injury in digits (toes) this is most likely due to injury while patient was on pressor therapy and sepsis. Now with a third toe injury, vascular consulted again. US completed.  ECHO EF 60-65%.   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   Activity: Daily PT/OT  Prophylaxis: PPI, DVT-SCD  Disposition: Transferred to SICU due to respiratory distress.    Medical Decision Making: Medium  Admit 11185  (moderate level decision making)    PILLO/Fellow/Resident Provider: Ada Driver PAC [id number]    Faculty: Jovan Tran M.D.  __________________________________________________________________  Transplant History:.  3/4/2017 DD OLT with Dr. Tran (Liver), Postoperative day: 143     Interval History:   Overnight events: Respiratory distress, abdominal distension, somnolence, EJ with anuria. Transferred to SICU. Intubated.      ROS:   A 10-point review of systems was negative except as noted above.    Meds:    meropenem  1 g Intravenous Q12H     DAPTOmycin (CUBICIN) intermittent infusion  8 mg/kg (Dosing Weight) Intravenous Q24H     micafungin  100 mg Intravenous Q24H     ganciclovir (CYTOVENE) intermittent infusion  5 mg/kg (Dosing Weight) Intravenous Q12H     albumin human  25 g Intravenous Q6H     protein modular  1 packet Per Feeding Tube BID     fludrocortisone  100 mcg Oral BID     aspirin  80 mg Oral Daily     insulin aspart   Subcutaneous TID w/meals     multivitamins with minerals  15 mL Per Feeding Tube Daily     heparin  5,000 Units Subcutaneous Q8H     pantoprazole  40 mg Oral or Feeding Tube Daily     sodium chloride (PF)  3 mL Intracatheter Q8H       Physical Exam:     Admit Weight: 94.2 kg (207 lb 11.2 oz) (SCALE 2)    Current vitals:   /62  Pulse 102  Temp 102.9  F (39.4  C) (Axillary)  Resp (P) 19  Ht 1.829 m (6')  Wt 112 kg (246 lb 14.4 oz)  SpO2 100%  BMI 33.49 kg/m2    Vital sign ranges:    Temp:  [97  F (36.1  C)-102.9  F (39.4  C)] 102.9  F (39.4  C)  Pulse:  [102] 102  Heart Rate:  [] 103  Resp:  [16-35] (P) 19  BP: ()/() 104/62  FiO2 (%):  [100 %] 100 %  SpO2:  [84 %-100 %] 100 %  Patient Vitals for the past 24 hrs:   BP Temp Temp src Pulse Heart Rate Resp SpO2 Weight   07/25/17 1600 104/62 102.9  F (39.4  C) Axillary - 103 (P) 19 100 % -   07/25/17 1530 103/64 - - - 101 - 100 % -   07/25/17 1500 100/59 - - - 99 16 100 % -   07/25/17 1445 105/71 - - - 96  16 100 % -   07/25/17 1430 98/57 - - - 96 16 99 % -   07/25/17 1415 98/60 - - - 95 16 99 % -   07/25/17 1400 96/61 - - - 95 16 97 % -   07/25/17 1345 100/66 - - - 98 16 98 % -   07/25/17 1330 108/76 - - - 98 16 99 % -   07/25/17 1315 107/80 - - - 104 16 95 % -   07/25/17 1300 101/86 - - - 103 16 100 % -   07/25/17 1245 (!) 135/92 - - - 110 - 100 % -   07/25/17 1230 105/59 - - - 109 - 99 % -   07/25/17 1218 (!) 147/142 99  F (37.2  C) Axillary - 112 (!) 35 96 % -   07/25/17 1216 112/83 - - - - - - -   07/25/17 1215 - - - - 110 - 100 % -   07/25/17 1200 133/85 - - - 111 - (!) 84 % -   07/25/17 1120 - - - - - (!) 35 92 % -   07/25/17 1117 121/70 - - 102 102 (!) 34 91 % -   07/25/17 1025 - - - - - - 94 % -   07/25/17 1000 - - - - - - 93 % -   07/25/17 0900 - - - - - - - 112 kg (246 lb 14.4 oz)   07/25/17 0805 - - - - 105 26 - -   07/25/17 0754 - - - - 104 - 90 % -   07/25/17 0715 126/69 98.9  F (37.2  C) Axillary - 101 22 - -   07/25/17 0446 145/86 98.9  F (37.2  C) Oral - 97 20 92 % -   07/25/17 0352 (!) 150/93 98.5  F (36.9  C) Oral - 113 22 94 % -   07/24/17 2323 142/81 98.5  F (36.9  C) Oral - 90 20 97 % -   07/24/17 2022 148/80 98  F (36.7  C) Axillary - 90 20 93 % -   07/24/17 1743 - 97  F (36.1  C) Axillary - - - - -   07/24/17 1630 - 100.6  F (38.1  C) Axillary - - - - -     General Appearance: more somnolence  Skin: normal, warm, dry  Heart: tachycardic  Lungs: Labored breathing on 10L face mask  Abdomen: tense, distended. ttp with percussion. Midline incision, c/d/i. Chevron incision well healed.   : vazquez is present, small amount dk UO.   Extremities: BLE mild edema.  Neurologic: orientated x 2 early this morning. Now on exam more somnolent.   PIV    Data:   CMP    Recent Labs  Lab 07/25/17  1335 07/25/17  0945 07/24/17  0705   NA  --  137 142   POTASSIUM  --  4.0 3.8   CHLORIDE  --  104 112*   CO2  --  26 24   GLC  --  382* 226*   BUN  --  77* 65*   CR  --  2.60* 1.88*   GFRESTIMATED  --  26* 38*    GFRESTBLACK  --  31* 46*   JACOB  --  7.6* 7.8*   ALBUMIN  --  1.9* 1.7*   BILITOTAL  --  0.5 0.4   ALKPHOS  --  317* 264*   AST  --  90* 86*   ALT  --  60 54   FAMY 8  --   --      CBC    Recent Labs  Lab 07/25/17  0945 07/24/17  0705   HGB 8.8* 7.8*   WBC 11.9* 6.4    126*     COAGS    Recent Labs  Lab 07/24/17  0705 07/21/17  1230   INR 1.11 1.24*      Urinalysis  Recent Labs   Lab Test  07/25/17   1205  07/19/17   1150   06/14/17   1508   04/11/16   1345   COLOR  Dark Yellow  Yellow   < >   --    < >  Yellow   APPEARANCE  Cloudy  Slightly Cloudy   < >   --    < >  Clear   URINEGLC  70*  Negative   < >   --    < >  30*   URINEBILI  Negative  Negative   < >   --    < >  Negative   URINEKETONE  Negative  Negative   < >   --    < >  Negative   SG  1.014  1.009   < >   --    < >  1.016   UBLD  Moderate*  Moderate*   < >   --    < >  Small*   URINEPH  5.0  5.0   < >   --    < >  5.0   PROTEIN  100*  10*   < >   --    < >  30*   NITRITE  Negative  Negative   < >   --    < >  Negative   LEUKEST  Trace*  Negative   < >   --    < >  Negative   RBCU  >182*  6*   < >   --    < >  1   WBCU  5*  2   < >   --    < >  1   UTPG   --    --    --   1.55*   --   0.41*    < > = values in this interval not displayed.       Cultures:   Blood Cultures x2 7/4/2017 NGTD     Abdominal Fluid Culture 7/4/2017: NGTD    Abdominal Fluid Culture 7/5/17: NGTD    Bronchoalveolar Culture 7/5/17: VRE.     Sputum endotracheal gm stain/culture 7/11/17: >25 PMNs/low power field   Few Mixed gram positive elvie    CT scan:    7/4/2017 CONCLUSION:   1. Right colonic wall thickening suggesting colitis. Follow-up is necessary to confirm resolution in order to exclude colonic mass.  2. Transverse colon is not well distended reducing evaluation for wall thickening.  3. Small amount of ascites.  4. Splenomegaly.  5. Prominent portal and splenic veins. If confirmation of vascular patency is indicated consider follow-up ultrasound of the liver with  "Doppler.  6. Small right pleural effusion.  7. Stranding in the subcutaneous fat of the ventral pelvis.     Abdominal XR 7/4/2017:   1. No evidence of pneumoperitoneum.  2. Dilated loop of bowel in the central abdomen, likely sigmoid.    XR Chest Portable 1 View 7/4/17:  1. Endotracheal tube tip projects 5.3 cm from the chacorta.  2. Increased bilateral pleural effusions, right greater than left.  3. Unchanged bibasilar patchy opacities.       7/21/2017   SPECIMEN(S):   A: Colon biopsy, ascending   B: Colon biopsy, descending     FINAL DIAGNOSIS:   A. COLON BIOPSY, ASCENDING:   - Colonic mucosa with no significant histologic abnormality   - Negative for intraepithelial lymphocytosis or deposition of   subepithelial thick collagenous band     B. COLON BIOPSY, DESCENDING:   - Crypt architecture distortion, consistent with healed prior injury   - Negative for active inflammation or dysplasia   - Negative for intraepithelial lymphocytosis or deposition of   subepithelial thick collagenous band   - Please, see comment     COMMENT:   Specimen B, \"descending colon\" features lamina propria edema, slight   variation in crypt sizes and shapes and occasional crypt drop-out.  This   may indicate a resolving injury which could be drug-induced or   infectious.     "

## 2017-07-25 NOTE — PROGRESS NOTES
Pt intubated at 1230 due to respiratory failure and altered MS. Pt placed on a CMV 16 500 100 +8. BS: good aeration bilateral. Please see flowsheets for further information.

## 2017-07-25 NOTE — PLAN OF CARE
Problem: Goal Outcome Summary  Goal: Goal Outcome Summary  PT: patient presented with increased confusion and combative this morning. He required moderate assist of 3 to 2 with bed mobility and sit to stand with minimal assist of 2 but very weak and impulsive.. Patient stood at EZ stand x 15 seconds and required multiple cues for redirection and patient able to follow 25% of the time. Recommend d/c to TCU when medically stable per plan of care established by physical therapist.

## 2017-07-25 NOTE — PLAN OF CARE
Problem: Goal Outcome Summary  Goal: Goal Outcome Summary  SLP: Dysphagia therapy cancelled.  Per RN, pt not appropriate this date with transfer to ICU.  ST to follow.

## 2017-07-25 NOTE — PROGRESS NOTES
TRANSFER  D:  Afebrile, HR tachy, O2 saturations declining (currently 92% on 10L oxiplus mask), respirations 30s, BP stable, reporting pain, blood glucose=318  Patient only oriented to self and place, mood labile, lethargic and combative at times and with attendant at bedside.  Chest and abdominal xray, abdominal ultrasound, and ABGs performed at bedside.  Blood cultures drawn.  Belongings sent with patient.  Provider to call wife for update.    I:  Scheduled meds given as ordered.  MIV started 100ml/hr as well as free water via [place] 60ml/hr.  Narcan given without effectiveness.  Report called to  RNJanice  [facility]  A:  Patient with declining respiratory status needing closer monitoring.    P:  Patient to be monitored on 4A.  Patient to have chest and abdominal CT and UA/UC to be sent when urine available.

## 2017-07-25 NOTE — PROGRESS NOTES
SICU Update  7/25/2017   6:16 PM     Right sided retroperitoneal air noted on CT abdomen.   Discussed with Dr. Tran, transplant team, whom recommended contacting colorectal surgery .  I spoke with CR fellow, who will evaluate the patient.    Discussed with SICU staff Dr. Trini Santoyo MD  Surgical ICU Fellow  [phone]

## 2017-07-25 NOTE — CONSULTS
Colorectal Surgery Consult Note    Staff: Dr. Dinh  Date: 7/25/2017   Consulted for: Retroperitoneal air by Dr. Santoyo    Assessment/Plan:  51 year old male with s/p liver transplant for CASTAÑEDA in March 2017. He was readmitted for severe abdominal pain and had a negative ex lap on 3/4/17. Pain and CT was attributed to neutropenic colitis at that time. He had a prolonged recovery from this operation. Most recently has clinically declined throughout the day resulting in intubation and transfer to ICU. He has also had declining urinary output. CT demonstrates air in the retroperitoneum. With recent colonoscopy with biopsies, air may be related to perforation. Based on clinical decline, there is high concern for infection of the retroperitoneum.     - Remains critically ill but status has relatively stabilized over the past several hours  - Will frequently re-evaluate over the night for changes in clinical status or exam  - Continued SICU cares    Discussed with Dr. Ramirez and Dr. Fabrizio Ferraro MD  General Surgery, PGY-2  Pg [phone]    Patient seen and examined.  51 year old male post liver txp 3/17 readmitted for possible colitis s/p ex lap without bowel resection 7/17 by transplant service.  The operative note mentions murky fluid but no obvious perforation or bowel ischemia.  Has had prolonged hospital stay.  Over the past several days has had worsening ascites and is now s/p paracentesis.  Had colonoscopy 7/21 with GI service to evaluate colitis.  Random biopsies were taken which were without significant abnormalities.  This morning developed worsening respiratory distress and transferred to the ICU.  Intubated and had paracentesis with 5L ascites removed.  Had some hypotension as well as fevers this afternoon.  Over the past several hours appears to have stabilized somewhat.  Not currently requiring pressors and on 40% FiO2 at the time of my visit.  CT scan obtained which  demonstrated significant retroperitoneal air on the right.  I have reviewed this myself and with the radiology resident.  There is no intraperitoneal air that I appreciate.  There is air tracking up to the duodenum but no extravasation on contrast.  On physical exam, the patient is intubated and sedated.  His abdomen is distended with a healed transplant and healing midline incision.  He does not have crepitus over his abdomen or right flank.  His labs are remarkable for a WBC of 10, lactate of 2.3, albumin of 2.0, prealbumin of 8, creatinine of 2.9 (from 2.6).  The etiology of his retroperitoneal air could be colonic vs duodenal although the former appears more likely given his recent colonoscopy, although only random biopsies were performed.  At this point, we would recommend treating him with IV antibiotics and close observation as we would for a post polypectomy patient.  Clearly he is high risk given his immunosuppression, malnutrition and recent surgery, among other things.  We will follow him very closely to monitor for clinical deterioration.  I discussed the situation with the patient's wife, including the significant potential for surgery.  The patient was discussed with the ICU staff, the transplant staff and Dr. Dinh who is in agreement with the assessment and plan.    Keenan Ramirez MD  CRS Fellow  Pager     ------------------------------------------  HPI:   Camacho Bhagat is a 51 year old male who is being evaluated for retroperitoneal air. His PMH is significant for CASTAÑEDA cirrhosis for which he underwent a liver transplant 3/4/17. Readmitted 7/4/17 for severe abdominal pain, elevated lactate and fever. He was taken for an ex lap which did not demonstrate any perforation. He was noted to have neutropenic colitis. Taken back for second look and closure on 7/5/17. He was started on broad spectrum antibiotics. Had a prolonged ICU stay postoperatively due to difficulty with extubation.  He was transferred to floor 7/17/17. On 7/21, he had a colonoscopy with biopsies to follow up on his neutropenic colitis. Transferred back to ICU today (7/25) for respiratory failure and AMS where he was intubated. He underwent 5L paracentesis due to concern for ascites contributing to abdominal compartment syndrome. He has been oliguric throughout the day. CT C/A/P obtained demonstrated right sided retroperitoneal air.    Review of Systems:  Unable to assess due to intubation.    PMH:  Past Medical History:   Diagnosis Date     Cancer (H)      Depressive disorder, not elsewhere classified      Esophageal reflux      Fibromyalgia 1/2009    dx with Dr Benitez( [facility])     History of thrombophlebitis      Osteoarthritis      Other acute embolism veins 11/01    Deep vein thrombophlebitis, filter placed     Other and unspecified hyperlipidemia      Other chronic nonalcoholic liver disease     Fatty liver      Other testicular hypofunction      Pneumonia, organism 10-01    Included ARDS, sepsis, and  acute renal failure; hospitalized     Rheumatoid arthritis(714.0)      Type II or unspecified type diabetes mellitus without mention of complication, not stated as uncontrolled 11/01    Managed by endocrinology     Unspecified essential hypertension 11/01    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     Unspecified sleep apnea     Uses BiPAP        PSHx:  Past Surgical History:   Procedure Laterality Date     BENCH LIVER N/A 3/4/2017    Procedure: BENCH LIVER;  Surgeon: Jovan Tran MD;  Location: [facility] OR     C NONSPECIFIC PROCEDURE      tracheostomy     C NONSPECIFIC PROCEDURE      repair of deviated septum     C NONSPECIFIC PROCEDURE  2007    Rt knee arthroscopy     C TOTAL KNEE ARTHROPLASTY  2008    Right knee arthroscopy     CHOLECYSTECTOMY       COLONOSCOPY N/A 7/21/2017    Procedure: COMBINED COLONOSCOPY, SINGLE OR MULTIPLE BIOPSY/POLYPECTOMY BY BIOPSY;  Colonoscopy;  Surgeon: Izaiah Montes MD;   Location: [facility] GI     ESOPHAGOSCOPY, GASTROSCOPY, DUODENOSCOPY (EGD), COMBINED N/A 2016    Procedure: COMBINED ESOPHAGOSCOPY, GASTROSCOPY, DUODENOSCOPY (EGD), BIOPSY SINGLE OR MULTIPLE;  Surgeon: Trent Pederson MD;  Location: [facility]     LAPAROTOMY EXPLORATORY N/A 2017    Procedure: LAPAROTOMY EXPLORATORY;  Exploratory Laparotomy, washout;  Surgeon: Tip Zhang MD;  Location: [facility] OR     LAPAROTOMY EXPLORATORY N/A 2017    Procedure: LAPAROTOMY EXPLORATORY;  Exploratory Laparotomy, Washout with closure.;  Surgeon: Tip Zhang MD;  Location: [facility] OR     TRANSPLANT LIVER RECIPIENT  DONOR N/A 3/4/2017    Procedure: TRANSPLANT LIVER RECIPIENT  DONOR;  Surgeon: Jovan Tran MD;  Location: [facility] OR        Medications:    No current facility-administered medications on file prior to encounter.   Current Outpatient Prescriptions on File Prior to Encounter:  gabapentin (NEURONTIN) 300 MG capsule Take 1 capsule (300 mg) by mouth 3 times daily   amLODIPine (NORVASC) 5 MG tablet Take 1 tablet (5 mg) by mouth daily   tacrolimus (PROGRAF - GENERIC EQUIVALENT) 1 MG capsule Take 4capsules (4mg) in the morning and 3 capsules (3 mg) in the evening. Take every 12 hours.   mycophenolate (CELLCEPT - GENERIC EQUIVALENT) 250 MG capsule Take 1 capsule (250 mg) by mouth every 12 hours   sulfamethoxazole-trimethoprim (BACTRIM/SEPTRA) 400-80 MG per tablet Take 1 tablet on Monday and take 1 tablet on Thursday   fludrocortisone (FLORINEF) 0.1 MG tablet Take 1 tablet (0.1 mg) by mouth daily For elevated potassium   oxyCODONE (ROXICODONE) 10 MG IR tablet Take 1 tablet (10 mg) by mouth daily as needed for moderate to severe pain   clotrimazole (MYCELEX) 10 MG LOZG lozenge Place 1 lozenge (1 Beatrice) inside cheek 4 times daily   Linagliptin (TRADJENTA PO) Take 5 mg by mouth   tadalafil (CIALIS) 5 MG tablet Take 1 tablet (5 mg) by mouth daily Never use with nitroglycerin, terazosin or doxazosin.    tadalafil (CIALIS) 5 MG tablet Take 1 tablet (5 mg) by mouth daily Never use with nitroglycerin, terazosin or doxazosin.   cyclobenzaprine (FLEXERIL) 10 MG tablet Take 1 tablet (10 mg) by mouth 3 times daily as needed for muscle spasms   omeprazole (PRILOSEC) 20 MG CR capsule Take 1 capsule (20 mg) by mouth 2 times daily (before meals)   magnesium oxide (MAG-OX) 400 MG tablet Take 1 tablet (400 mg) by mouth 2 times daily   lidocaine (LIDODERM) 5 % Patch Place 1 patch onto the skin every 24 hours (Patient not taking: Reported on 6/7/2017)   LACTOBACILLUS EXTRA STRENGTH CAPS Take 1 capsule by mouth 2 times daily   prochlorperazine (COMPAZINE) 5 MG tablet Take 1-2 tablets (5-10 mg) by mouth every 6 hours as needed for nausea or vomiting (Patient not taking: Reported on 6/7/2017)   oxyCODONE (ROXICODONE) 5 MG IR tablet Take 1-2 tablets (5-10 mg) by mouth every 4 hours as needed for moderate to severe pain   aspirin 325 MG tablet 1 tablet (325 mg) by Oral or Feeding Tube route daily   insulin aspart (NOVOLOG PEN) 100 UNIT/ML injection DOSE:  1 units per 8 grams of carbohydrate. With meals and snacks. Only chart total amount of units given.  Do not give if pre-prandial glucose is less than 60 mg/dL.   insulin glargine (LANTUS) 100 UNIT/ML injection Inject 45 Units Subcutaneous every 24 hours (Patient taking differently: Inject 50 Units Subcutaneous every 24 hours )   valGANciclovir (VALCYTE) 450 MG tablet Take 1 tablet (450 mg) by mouth daily   multivitamin, therapeutic with minerals (THERA-VIT-M) TABS tablet Take 1 tablet by mouth daily   ondansetron (ZOFRAN-ODT) 4 MG ODT tab Take 1 tablet (4 mg) by mouth every 8 hours as needed for nausea   glucagon 1 MG SOLR injection Inject 1 mg Subcutaneous every 15 minutes as needed for low blood sugar (May repeat x 1 only)       Allergies:   Erythromycin and Vioxx     SocHx:  Social History     Social History     Marital status:      Spouse name: N/A     Number of  children: 1     Years of education: N/A     Occupational History            Social History Main Topics     Smoking status: Former Smoker     Smokeless tobacco: Former User     Types: Chew     Quit date: 10/8/2015      Comment: Has used chewing tobacco since age 15 , chewed for 20yrs      Alcohol use No      Comment: last drink about a year ago (quit 2001)     Drug use: No     Sexual activity: Yes     Partners: Female     Other Topics Concern     Not on file     Social History Narrative    uSED TO BE      Back in school now         FamHx:  Family History   Problem Relation Age of Onset     DIABETES Father      Hypertension Father      Substance Abuse Father      Arthritis Mother      CANCER Mother      Thyroid     Thyroid Disease Mother      Other Cancer Mother      Colon Cancer No family hx of      Hyperlipidemia No family hx of      Coronary Artery Disease No family hx of      CEREBROVASCULAR DISEASE No family hx of      Breast Cancer No family hx of      Prostate Cancer No family hx of      Physical Examination   BP (!) 103/96  Pulse 102  Temp 101.5  F (38.6  C) (Axillary)  Resp 22  Ht 1.829 m (6')  Wt 112 kg (246 lb 14.4 oz)  SpO2 97%  BMI 33.49 kg/m2  General: Intubated and sedated  Pulm: Vented, diminished breath sounds at the base  CV: intermittently tachycardic   Abdomen: soft, non-tender, dull to percussion, no masses palpable, midline laparotomy incision, no crepitus or skin changes along flank or back  Musculoskel/Extremities: no erythema, tenderness, blackened first and second toe on right foot  Skin: no rashes, no diaphoresis and skin color normal     Labs: Reviewed   WBC 10.3  Hgb 7.5  Plt 141  INR 1.23  Na 138  K 4.4  Cr 2.9  Alk phos 242  ALT 50  AST 61  Lacate 2.3    Imaging: Reviewed  IMPRESSION:   1. New large heterogeneous area of right-sided retroperitoneal gas  which is highly concerning for an infectious process. Given the  history of prior  neutropenic colitis and biopsies, there could also be  a component of stool from a ruptured viscus, despite the lack of  surrounding bowel loops and no extraluminal oral contrast. Surgical  consultation is recommended.      2. Bibasilar atelectasis versus consolidation with small bilateral  pleural effusions.     3. Extensive mesenteric and soft tissue edema with large volume  ascites. Numerous mesenteric and retroperitoneal lymph nodes which are  presumably reactive.     4. Postsurgical changes of liver transplant.     [Urgent Result: Retroperitoneal gas]      Staff Addendum:  Agree with the consultation H&P as documented by the housestaff. I was personally involved with the recommendations made by our service for this patient.  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 Recommend supportive care and if no improvement in 24 hours we could consider laparotomy with resection and end ileostomy.   Sara Dinh MD  Colon and Rectal Surgery Staff  [facility]

## 2017-07-25 NOTE — PLAN OF CARE
Problem: Goal Outcome Summary  Goal: Goal Outcome Summary  Outcome: No Change  VSS on 3L of O2/NC. BP was 150/93 and  at 0400. Triggered sepsis protocol. Lactic drawn and was 1.2. BP and HR better at 0430, 145/86 and 97bpm. On call provider notified with no new orders. Blood sugars 186 and 264. C/o lower back pain. Small amount of relief noted from PRN oxycodone. Frequent repositioning and back rubs given throughout the shift. Denies nausea. Tolerating Nepro tube feeds at goal rate of 115mL/hr with 60cc of water every 3hr via NJ tube. PIV to RFA infusing NS at 10mL/hr. No BM noted this shift. Has had 175cc of clear, hardik urine out of his vazquez so far this shift. Incision to abdomen well approximated with staples. No drainage noted. Confusion continues. Seems to be more so this morning. Not easily reoriented. Pulling at NJ tube and PIV occasionally. Able to answer most questions appropriately. Bedside attendant in room.

## 2017-07-25 NOTE — PROGRESS NOTES
Brief GI Note:    Pathology results from colonoscopy returned.  Endoscopically mucosa appeared normal.  Histology revealed pattern consistent with a healed injury though no acute inflammation or dysplasia.  Favor resolved medication induced vs infectious colitis.    GI will sign off    Discussed with Dr. Alix Queen  Gastroenterology Fellow

## 2017-07-25 NOTE — PROGRESS NOTES
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  He has experienced increased hyperglycemia related to acute stress and enteral feedings.    With increase in NPH last night, overnight BGs should have improved to sub-200.  Expect increased hyperglycemia related to infection, stress.    Current recommendations below, but if clinical picture remains in decline, hyperglycemia best managed with IV insulin infusion.    -monitor closely for hypoglycemia if regular insulin is bolused for hyperkalemia  -glargine 20 units q24h at 1200.  -NPH to 40 units for Nepro 115 cc/h x 12 h, Order reads HOLD NPH if no TFs  -aspart 1 unit per 10 grams carbohydrate if taking PO  -aspart high correction q4h     Outpatient diabetes follow up: Dr. Eckert at [facility]             Interval History:   The last 24 hours progress and nursing notes reviewed.  TF regimen is: Nepro 115 cc/h x 12 hours (note this was ordered for 14 h last week).   Sepsis protocol triggered overnight.  No evidence of additional carbohydrate intake, yet glucose up to high 300s this morning. Creatinine spiked today.   Pt with increased dyspnea, not responding to verbal.  Anticipating transfer to higher level of care.      Recent Labs  Lab 07/25/17  0945 07/25/17  0803 07/25/17  0401 07/25/17  0001 07/24/17 2006 07/24/17  1549 07/24/17  1153  07/24/17  0705  07/23/17  0625  07/22/17  0638  07/22/17  0313  07/21/17  0615   *  --   --   --   --   --   --   --  226*  --  237*  --  184*  --  186*  --  81   BGM  --  318* 264* 186* 125* 133* 143*  < >  --   < >  --   < >  --   < >  --   < >  --    < > = values in this  interval not displayed.            Review of Systems:   See interval hx          Medications:   PTA taking Januvia and glargine versus glargine and aspart per carb    Active Diet Order      Dysphagia Diet Level 3 Advanced Thin Liquids (water, ice chips, juice, milk gelatin, ice cream, etc)     Physical Exam:  Gen:  resting in recliner, in NAD   HEENT: NC/AT, mucous membranes are moist, NJ feeding tube  Resp: tachypneic  Neuro: not arouse to verbal  /70 (BP Location: Left arm)  Pulse 102  Temp 98.9  F (37.2  C) (Axillary)  Resp (!) 34  Ht 1.829 m (6')  Wt 112 kg (246 lb 14.4 oz)  SpO2 91%  BMI 33.49 kg/m2           Data:     Lab Results   Component Value Date    A1C  07/06/2017     Canceled, Test credited   Below Assay Range  NOTIFIED LEONARD ONEILL AT 0538 ON 7/6/17 BY CHRISSY      A1C 9.4 02/16/2017    A1C 6.2 12/14/2016    A1C 6.1 10/27/2016    A1C 8.3 07/02/2012            Recent Labs   Lab Test  07/25/17   0945  07/24/17   0705   NA  137  142   POTASSIUM  4.0  3.8   CHLORIDE  104  112*   CO2  26  24   ANIONGAP  7  6   GLC  382*  226*   BUN  77*  65*   CR  2.60*  1.88*   JACOB  7.6*  7.8*     CBC RESULTS:   Recent Labs   Lab Test  07/25/17   0945   WBC  11.9*   RBC  3.17*   HGB  8.8*   HCT  27.6*   MCV  87   MCH  27.8   MCHC  31.9   RDW  16.4*   PLT  168     Janice Guzman APRN [id number]    Diabetes Management job code 0247

## 2017-07-26 ENCOUNTER — ANESTHESIA (OUTPATIENT)
Dept: SURGERY | Facility: CLINIC | Age: 52
End: 2017-07-26
Payer: COMMERCIAL

## 2017-07-26 ENCOUNTER — APPOINTMENT (OUTPATIENT)
Dept: GENERAL RADIOLOGY | Facility: CLINIC | Age: 52
End: 2017-07-26
Attending: PHYSICIAN ASSISTANT
Payer: COMMERCIAL

## 2017-07-26 ENCOUNTER — ANESTHESIA EVENT (OUTPATIENT)
Dept: SURGERY | Facility: CLINIC | Age: 52
End: 2017-07-26
Payer: COMMERCIAL

## 2017-07-26 PROBLEM — Z94.4 S/P LIVER TRANSPLANT (H): Status: ACTIVE | Noted: 2017-07-26

## 2017-07-26 LAB
ABO + RH BLD: NORMAL
ANION GAP SERPL CALCULATED.3IONS-SCNC: 11 MMOL/L (ref 3–14)
ANION GAP SERPL CALCULATED.3IONS-SCNC: 9 MMOL/L (ref 3–14)
APTT PPP: 39 SEC (ref 22–37)
APTT PPP: 44 SEC (ref 22–37)
BACTERIA SPEC CULT: NO GROWTH
BASE DEFICIT BLDA-SCNC: NORMAL MMOL/L
BASE DEFICIT BLDA-SCNC: NORMAL MMOL/L
BASE DEFICIT BLDV-SCNC: 5.8 MMOL/L
BASE DEFICIT BLDV-SCNC: 6.3 MMOL/L
BASE DEFICIT BLDV-SCNC: 6.6 MMOL/L
BASE DEFICIT BLDV-SCNC: 6.9 MMOL/L
BASE EXCESS BLDA CALC-SCNC: NORMAL MMOL/L
BASE EXCESS BLDA CALC-SCNC: NORMAL MMOL/L
BASOPHILS # BLD AUTO: 0 10E9/L (ref 0–0.2)
BASOPHILS NFR BLD AUTO: 0.1 %
BLD GP AB SCN SERPL QL: NORMAL
BLD GP AB SCN SERPL QL: NORMAL
BLD PROD TYP BPU: NORMAL
BLD UNIT ID BPU: 0
BLOOD BANK CMNT PATIENT-IMP: NORMAL
BLOOD BANK CMNT PATIENT-IMP: NORMAL
BLOOD PRODUCT CODE: NORMAL
BPU ID: NORMAL
BUN SERPL-MCNC: 74 MG/DL (ref 7–30)
BUN SERPL-MCNC: 75 MG/DL (ref 7–30)
CA-I BLD-MCNC: 4.3 MG/DL (ref 4.4–5.2)
CA-I BLD-MCNC: 4.4 MG/DL (ref 4.4–5.2)
CA-I BLD-MCNC: 4.5 MG/DL (ref 4.4–5.2)
CA-I BLD-MCNC: NORMAL MG/DL (ref 4.4–5.2)
CALCIUM SERPL-MCNC: 7.6 MG/DL (ref 8.5–10.1)
CALCIUM SERPL-MCNC: 7.9 MG/DL (ref 8.5–10.1)
CHLORIDE SERPL-SCNC: 108 MMOL/L (ref 94–109)
CHLORIDE SERPL-SCNC: 108 MMOL/L (ref 94–109)
CMV DNA SPEC QL NAA+PROBE: ABNORMAL
CO2 SERPL-SCNC: 21 MMOL/L (ref 20–32)
CO2 SERPL-SCNC: 23 MMOL/L (ref 20–32)
CREAT SERPL-MCNC: 2.76 MG/DL (ref 0.66–1.25)
CREAT SERPL-MCNC: 3.09 MG/DL (ref 0.66–1.25)
DIFFERENTIAL METHOD BLD: ABNORMAL
EOSINOPHIL # BLD AUTO: 0.1 10E9/L (ref 0–0.7)
EOSINOPHIL NFR BLD AUTO: 0.8 %
ERYTHROCYTE [DISTWIDTH] IN BLOOD BY AUTOMATED COUNT: 15.8 % (ref 10–15)
ERYTHROCYTE [DISTWIDTH] IN BLOOD BY AUTOMATED COUNT: 16.2 % (ref 10–15)
FIBRINOGEN PPP-MCNC: 434 MG/DL (ref 200–420)
FIBRINOGEN PPP-MCNC: 442 MG/DL (ref 200–420)
GFR SERPL CREATININE-BSD FRML MDRD: 21 ML/MIN/1.7M2
GFR SERPL CREATININE-BSD FRML MDRD: 24 ML/MIN/1.7M2
GLUCOSE BLD-MCNC: 149 MG/DL (ref 70–99)
GLUCOSE BLD-MCNC: 151 MG/DL (ref 70–99)
GLUCOSE BLD-MCNC: 155 MG/DL (ref 70–99)
GLUCOSE BLD-MCNC: NORMAL MG/DL (ref 70–99)
GLUCOSE BLDC GLUCOMTR-MCNC: 111 MG/DL (ref 70–99)
GLUCOSE BLDC GLUCOMTR-MCNC: 127 MG/DL (ref 70–99)
GLUCOSE BLDC GLUCOMTR-MCNC: 129 MG/DL (ref 70–99)
GLUCOSE BLDC GLUCOMTR-MCNC: 144 MG/DL (ref 70–99)
GLUCOSE BLDC GLUCOMTR-MCNC: 145 MG/DL (ref 70–99)
GLUCOSE SERPL-MCNC: 129 MG/DL (ref 70–99)
GLUCOSE SERPL-MCNC: 151 MG/DL (ref 70–99)
GRAM STN SPEC: NORMAL
HCO3 BLD-SCNC: NORMAL MMOL/L (ref 21–28)
HCO3 BLD-SCNC: NORMAL MMOL/L (ref 21–28)
HCO3 BLDV-SCNC: 20 MMOL/L (ref 21–28)
HCO3 BLDV-SCNC: 21 MMOL/L (ref 21–28)
HCT VFR BLD AUTO: 17.6 % (ref 40–53)
HCT VFR BLD AUTO: 27.5 % (ref 40–53)
HGB BLD-MCNC: 5.9 G/DL (ref 13.3–17.7)
HGB BLD-MCNC: 5.9 G/DL (ref 13.3–17.7)
HGB BLD-MCNC: 6.7 G/DL (ref 13.3–17.7)
HGB BLD-MCNC: 9.1 G/DL (ref 13.3–17.7)
HGB BLD-MCNC: 9.5 G/DL (ref 13.3–17.7)
HGB BLD-MCNC: 9.6 G/DL (ref 13.3–17.7)
HGB BLD-MCNC: 9.8 G/DL (ref 13.3–17.7)
HGB BLD-MCNC: NORMAL G/DL (ref 13.3–17.7)
IMM GRANULOCYTES # BLD: 0 10E9/L (ref 0–0.4)
IMM GRANULOCYTES NFR BLD: 0.3 %
INR PPP: 1.3 (ref 0.86–1.14)
INR PPP: 1.31 (ref 0.86–1.14)
INR PPP: 1.31 (ref 0.86–1.14)
LACTATE BLD-SCNC: 0.6 MMOL/L (ref 0.7–2.1)
LACTATE BLD-SCNC: 1 MMOL/L (ref 0.7–2.1)
LYMPHOCYTES # BLD AUTO: 1.6 10E9/L (ref 0.8–5.3)
LYMPHOCYTES NFR BLD AUTO: 21.4 %
MAGNESIUM SERPL-MCNC: 1.9 MG/DL (ref 1.6–2.3)
MCH RBC QN AUTO: 28.2 PG (ref 26.5–33)
MCH RBC QN AUTO: 28.3 PG (ref 26.5–33)
MCHC RBC AUTO-ENTMCNC: 33.1 G/DL (ref 31.5–36.5)
MCHC RBC AUTO-ENTMCNC: 33.5 G/DL (ref 31.5–36.5)
MCV RBC AUTO: 84 FL (ref 78–100)
MCV RBC AUTO: 85 FL (ref 78–100)
MICRO REPORT STATUS: NORMAL
MICRO REPORT STATUS: NORMAL
MONOCYTES # BLD AUTO: 0.3 10E9/L (ref 0–1.3)
MONOCYTES NFR BLD AUTO: 4.2 %
NEUTROPHILS # BLD AUTO: 5.6 10E9/L (ref 1.6–8.3)
NEUTROPHILS NFR BLD AUTO: 73.2 %
NRBC # BLD AUTO: 0 10*3/UL
NRBC BLD AUTO-RTO: 0 /100
NUM BPU REQUESTED: 2
NUM BPU REQUESTED: 2
NUM BPU REQUESTED: 4
O2/TOTAL GAS SETTING VFR VENT: 100 %
O2/TOTAL GAS SETTING VFR VENT: 40 %
O2/TOTAL GAS SETTING VFR VENT: 62 %
O2/TOTAL GAS SETTING VFR VENT: NORMAL %
OXYHGB MFR BLDV: 81 %
PCO2 BLD: NORMAL MM HG (ref 35–45)
PCO2 BLD: NORMAL MM HG (ref 35–45)
PCO2 BLDV: 42 MM HG (ref 40–50)
PCO2 BLDV: 45 MM HG (ref 40–50)
PCO2 BLDV: 47 MM HG (ref 40–50)
PCO2 BLDV: 54 MM HG (ref 40–50)
PH BLD: NORMAL PH (ref 7.35–7.45)
PH BLD: NORMAL PH (ref 7.35–7.45)
PH BLDV: 7.19 PH (ref 7.32–7.43)
PH BLDV: 7.24 PH (ref 7.32–7.43)
PH BLDV: 7.27 PH (ref 7.32–7.43)
PH BLDV: 7.3 PH (ref 7.32–7.43)
PHOSPHATE SERPL-MCNC: 4.1 MG/DL (ref 2.5–4.5)
PLATELET # BLD AUTO: 109 10E9/L (ref 150–450)
PLATELET # BLD AUTO: 136 10E9/L (ref 150–450)
PLATELET # BLD AUTO: 138 10E9/L (ref 150–450)
PO2 BLD: NORMAL MM HG (ref 80–105)
PO2 BLD: NORMAL MM HG (ref 80–105)
PO2 BLDV: 45 MM HG (ref 25–47)
PO2 BLDV: 53 MM HG (ref 25–47)
PO2 BLDV: 65 MM HG (ref 25–47)
PO2 BLDV: 77 MM HG (ref 25–47)
POTASSIUM BLD-SCNC: 3.4 MMOL/L (ref 3.4–5.3)
POTASSIUM BLD-SCNC: 3.5 MMOL/L (ref 3.4–5.3)
POTASSIUM BLD-SCNC: 3.5 MMOL/L (ref 3.4–5.3)
POTASSIUM BLD-SCNC: NORMAL MMOL/L (ref 3.4–5.3)
POTASSIUM SERPL-SCNC: 3.5 MMOL/L (ref 3.4–5.3)
POTASSIUM SERPL-SCNC: 3.5 MMOL/L (ref 3.4–5.3)
RBC # BLD AUTO: 2.09 10E12/L (ref 4.4–5.9)
RBC # BLD AUTO: 3.22 10E12/L (ref 4.4–5.9)
SODIUM BLD-SCNC: 139 MMOL/L (ref 133–144)
SODIUM BLD-SCNC: NORMAL MMOL/L (ref 133–144)
SODIUM SERPL-SCNC: 140 MMOL/L (ref 133–144)
SODIUM SERPL-SCNC: 140 MMOL/L (ref 133–144)
SPECIMEN EXP DATE BLD: NORMAL
SPECIMEN EXP DATE BLD: NORMAL
SPECIMEN SOURCE: ABNORMAL
SPECIMEN SOURCE: NORMAL
SPECIMEN SOURCE: NORMAL
TRANSFUSION STATUS PATIENT QL: NORMAL
WBC # BLD AUTO: 10.3 10E9/L (ref 4–11)
WBC # BLD AUTO: 7.7 10E9/L (ref 4–11)

## 2017-07-26 PROCEDURE — 25000128 H RX IP 250 OP 636: Performed by: SURGERY

## 2017-07-26 PROCEDURE — 25000128 H RX IP 250 OP 636: Performed by: ANESTHESIOLOGY

## 2017-07-26 PROCEDURE — 83735 ASSAY OF MAGNESIUM: CPT | Performed by: SURGERY

## 2017-07-26 PROCEDURE — 0DTF0ZZ RESECTION OF RIGHT LARGE INTESTINE, OPEN APPROACH: ICD-10-PCS | Performed by: COLON & RECTAL SURGERY

## 2017-07-26 PROCEDURE — 27210913 ZZH NUTRITION PRODUCT INTERMEDIATE PACKET

## 2017-07-26 PROCEDURE — 87205 SMEAR GRAM STAIN: CPT | Performed by: COLON & RECTAL SURGERY

## 2017-07-26 PROCEDURE — 87102 FUNGUS ISOLATION CULTURE: CPT | Performed by: COLON & RECTAL SURGERY

## 2017-07-26 PROCEDURE — 82330 ASSAY OF CALCIUM: CPT | Performed by: TRANSPLANT SURGERY

## 2017-07-26 PROCEDURE — 25000128 H RX IP 250 OP 636: Performed by: STUDENT IN AN ORGANIZED HEALTH CARE EDUCATION/TRAINING PROGRAM

## 2017-07-26 PROCEDURE — 20000004 ZZH R&B ICU UMMC

## 2017-07-26 PROCEDURE — 27210794 ZZH OR GENERAL SUPPLY STERILE: Performed by: COLON & RECTAL SURGERY

## 2017-07-26 PROCEDURE — 85384 FIBRINOGEN ACTIVITY: CPT | Performed by: STUDENT IN AN ORGANIZED HEALTH CARE EDUCATION/TRAINING PROGRAM

## 2017-07-26 PROCEDURE — 25000132 ZZH RX MED GY IP 250 OP 250 PS 637: Performed by: STUDENT IN AN ORGANIZED HEALTH CARE EDUCATION/TRAINING PROGRAM

## 2017-07-26 PROCEDURE — 40000978 ZZH STATISTIC COMPARTMENT STUDY

## 2017-07-26 PROCEDURE — 84295 ASSAY OF SERUM SODIUM: CPT | Performed by: TRANSPLANT SURGERY

## 2017-07-26 PROCEDURE — 82805 BLOOD GASES W/O2 SATURATION: CPT | Performed by: STUDENT IN AN ORGANIZED HEALTH CARE EDUCATION/TRAINING PROGRAM

## 2017-07-26 PROCEDURE — 85384 FIBRINOGEN ACTIVITY: CPT | Performed by: TRANSPLANT SURGERY

## 2017-07-26 PROCEDURE — 84100 ASSAY OF PHOSPHORUS: CPT | Performed by: SURGERY

## 2017-07-26 PROCEDURE — 84132 ASSAY OF SERUM POTASSIUM: CPT | Performed by: TRANSPLANT SURGERY

## 2017-07-26 PROCEDURE — 88307 TISSUE EXAM BY PATHOLOGIST: CPT | Performed by: COLON & RECTAL SURGERY

## 2017-07-26 PROCEDURE — 86850 RBC ANTIBODY SCREEN: CPT | Performed by: ANESTHESIOLOGY

## 2017-07-26 PROCEDURE — 40000275 ZZH STATISTIC RCP TIME EA 10 MIN

## 2017-07-26 PROCEDURE — 85018 HEMOGLOBIN: CPT | Performed by: PHYSICIAN ASSISTANT

## 2017-07-26 PROCEDURE — 25000565 ZZH ISOFLURANE, EA 15 MIN: Performed by: COLON & RECTAL SURGERY

## 2017-07-26 PROCEDURE — 86901 BLOOD TYPING SEROLOGIC RH(D): CPT | Performed by: ANESTHESIOLOGY

## 2017-07-26 PROCEDURE — 25000125 ZZHC RX 250: Performed by: NURSE ANESTHETIST, CERTIFIED REGISTERED

## 2017-07-26 PROCEDURE — P9059 PLASMA, FRZ BETWEEN 8-24HOUR: HCPCS | Performed by: TRANSPLANT SURGERY

## 2017-07-26 PROCEDURE — 99212 OFFICE O/P EST SF 10 MIN: CPT

## 2017-07-26 PROCEDURE — 25000132 ZZH RX MED GY IP 250 OP 250 PS 637: Performed by: TRANSPLANT SURGERY

## 2017-07-26 PROCEDURE — 25000132 ZZH RX MED GY IP 250 OP 250 PS 637: Performed by: SURGERY

## 2017-07-26 PROCEDURE — P9047 ALBUMIN (HUMAN), 25%, 50ML: HCPCS | Performed by: SURGERY

## 2017-07-26 PROCEDURE — 87070 CULTURE OTHR SPECIMN AEROBIC: CPT | Performed by: COLON & RECTAL SURGERY

## 2017-07-26 PROCEDURE — 36000059 ZZH SURGERY LEVEL 3 EA 15 ADDTL MIN UMMC: Performed by: COLON & RECTAL SURGERY

## 2017-07-26 PROCEDURE — 94003 VENT MGMT INPAT SUBQ DAY: CPT

## 2017-07-26 PROCEDURE — 87075 CULTR BACTERIA EXCEPT BLOOD: CPT | Performed by: COLON & RECTAL SURGERY

## 2017-07-26 PROCEDURE — P9016 RBC LEUKOCYTES REDUCED: HCPCS | Performed by: TRANSPLANT SURGERY

## 2017-07-26 PROCEDURE — 88342 IMHCHEM/IMCYTCHM 1ST ANTB: CPT | Performed by: COLON & RECTAL SURGERY

## 2017-07-26 PROCEDURE — 36000057 ZZH SURGERY LEVEL 3 1ST 30 MIN - UMMC: Performed by: COLON & RECTAL SURGERY

## 2017-07-26 PROCEDURE — 85730 THROMBOPLASTIN TIME PARTIAL: CPT | Performed by: TRANSPLANT SURGERY

## 2017-07-26 PROCEDURE — 85610 PROTHROMBIN TIME: CPT | Performed by: TRANSPLANT SURGERY

## 2017-07-26 PROCEDURE — 25000125 ZZHC RX 250: Performed by: SURGERY

## 2017-07-26 PROCEDURE — 86900 BLOOD TYPING SEROLOGIC ABO: CPT | Performed by: ANESTHESIOLOGY

## 2017-07-26 PROCEDURE — 74000 XR ABDOMEN PORT F1 VW: CPT

## 2017-07-26 PROCEDURE — 40000344 ZZHCL STATISTIC THAWING COMPONENT: Performed by: TRANSPLANT SURGERY

## 2017-07-26 PROCEDURE — 80048 BASIC METABOLIC PNL TOTAL CA: CPT | Performed by: STUDENT IN AN ORGANIZED HEALTH CARE EDUCATION/TRAINING PROGRAM

## 2017-07-26 PROCEDURE — 25000128 H RX IP 250 OP 636: Performed by: TRANSPLANT SURGERY

## 2017-07-26 PROCEDURE — 37000009 ZZH ANESTHESIA TECHNICAL FEE, EACH ADDTL 15 MIN: Performed by: COLON & RECTAL SURGERY

## 2017-07-26 PROCEDURE — 85610 PROTHROMBIN TIME: CPT | Performed by: STUDENT IN AN ORGANIZED HEALTH CARE EDUCATION/TRAINING PROGRAM

## 2017-07-26 PROCEDURE — 25000132 ZZH RX MED GY IP 250 OP 250 PS 637: Performed by: PHYSICIAN ASSISTANT

## 2017-07-26 PROCEDURE — 83605 ASSAY OF LACTIC ACID: CPT | Performed by: ANESTHESIOLOGY

## 2017-07-26 PROCEDURE — 0DNF0ZZ RELEASE RIGHT LARGE INTESTINE, OPEN APPROACH: ICD-10-PCS | Performed by: COLON & RECTAL SURGERY

## 2017-07-26 PROCEDURE — 99291 CRITICAL CARE FIRST HOUR: CPT | Mod: GC | Performed by: SURGERY

## 2017-07-26 PROCEDURE — P9016 RBC LEUKOCYTES REDUCED: HCPCS | Performed by: ANESTHESIOLOGY

## 2017-07-26 PROCEDURE — 82947 ASSAY GLUCOSE BLOOD QUANT: CPT | Performed by: TRANSPLANT SURGERY

## 2017-07-26 PROCEDURE — 0DBS0ZZ EXCISION OF GREATER OMENTUM, OPEN APPROACH: ICD-10-PCS | Performed by: COLON & RECTAL SURGERY

## 2017-07-26 PROCEDURE — 25000128 H RX IP 250 OP 636: Performed by: NURSE ANESTHETIST, CERTIFIED REGISTERED

## 2017-07-26 PROCEDURE — 0D1B0Z4 BYPASS ILEUM TO CUTANEOUS, OPEN APPROACH: ICD-10-PCS | Performed by: COLON & RECTAL SURGERY

## 2017-07-26 PROCEDURE — 85027 COMPLETE CBC AUTOMATED: CPT | Performed by: STUDENT IN AN ORGANIZED HEALTH CARE EDUCATION/TRAINING PROGRAM

## 2017-07-26 PROCEDURE — 82803 BLOOD GASES ANY COMBINATION: CPT | Performed by: TRANSPLANT SURGERY

## 2017-07-26 PROCEDURE — 37000008 ZZH ANESTHESIA TECHNICAL FEE, 1ST 30 MIN: Performed by: COLON & RECTAL SURGERY

## 2017-07-26 PROCEDURE — 85018 HEMOGLOBIN: CPT | Performed by: ANESTHESIOLOGY

## 2017-07-26 PROCEDURE — 80048 BASIC METABOLIC PNL TOTAL CA: CPT | Performed by: SURGERY

## 2017-07-26 PROCEDURE — 85025 COMPLETE CBC W/AUTO DIFF WBC: CPT | Performed by: PHYSICIAN ASSISTANT

## 2017-07-26 PROCEDURE — 00000146 ZZHCL STATISTIC GLUCOSE BY METER IP

## 2017-07-26 PROCEDURE — 83605 ASSAY OF LACTIC ACID: CPT | Performed by: STUDENT IN AN ORGANIZED HEALTH CARE EDUCATION/TRAINING PROGRAM

## 2017-07-26 PROCEDURE — 25000125 ZZHC RX 250: Performed by: TRANSPLANT SURGERY

## 2017-07-26 PROCEDURE — 85730 THROMBOPLASTIN TIME PARTIAL: CPT | Performed by: STUDENT IN AN ORGANIZED HEALTH CARE EDUCATION/TRAINING PROGRAM

## 2017-07-26 PROCEDURE — 85049 AUTOMATED PLATELET COUNT: CPT | Performed by: TRANSPLANT SURGERY

## 2017-07-26 PROCEDURE — 40000196 ZZH STATISTIC RAPCV CVP MONITORING

## 2017-07-26 PROCEDURE — 25000128 H RX IP 250 OP 636: Performed by: PHYSICIAN ASSISTANT

## 2017-07-26 PROCEDURE — 86923 COMPATIBILITY TEST ELECTRIC: CPT | Performed by: ANESTHESIOLOGY

## 2017-07-26 RX ORDER — FENTANYL CITRATE 50 UG/ML
25-50 INJECTION, SOLUTION INTRAMUSCULAR; INTRAVENOUS
Status: DISCONTINUED | OUTPATIENT
Start: 2017-07-26 | End: 2017-07-28

## 2017-07-26 RX ORDER — HYDROMORPHONE HYDROCHLORIDE 1 MG/ML
.3-.5 INJECTION, SOLUTION INTRAMUSCULAR; INTRAVENOUS; SUBCUTANEOUS EVERY 5 MIN PRN
Status: DISCONTINUED | OUTPATIENT
Start: 2017-07-26 | End: 2017-07-27

## 2017-07-26 RX ORDER — PROPOFOL 10 MG/ML
INJECTION, EMULSION INTRAVENOUS CONTINUOUS PRN
Status: DISCONTINUED | OUTPATIENT
Start: 2017-07-26 | End: 2017-07-26

## 2017-07-26 RX ORDER — ONDANSETRON 4 MG/1
4 TABLET, ORALLY DISINTEGRATING ORAL EVERY 30 MIN PRN
Status: DISCONTINUED | OUTPATIENT
Start: 2017-07-26 | End: 2017-07-29

## 2017-07-26 RX ORDER — NALOXONE HYDROCHLORIDE 0.4 MG/ML
.1-.4 INJECTION, SOLUTION INTRAMUSCULAR; INTRAVENOUS; SUBCUTANEOUS
Status: DISCONTINUED | OUTPATIENT
Start: 2017-07-26 | End: 2017-07-27

## 2017-07-26 RX ORDER — SODIUM CHLORIDE, SODIUM LACTATE, POTASSIUM CHLORIDE, CALCIUM CHLORIDE 600; 310; 30; 20 MG/100ML; MG/100ML; MG/100ML; MG/100ML
INJECTION, SOLUTION INTRAVENOUS CONTINUOUS
Status: DISCONTINUED | OUTPATIENT
Start: 2017-07-26 | End: 2017-08-10

## 2017-07-26 RX ORDER — METOPROLOL TARTRATE 1 MG/ML
1-2 INJECTION, SOLUTION INTRAVENOUS EVERY 5 MIN PRN
Status: DISCONTINUED | OUTPATIENT
Start: 2017-07-26 | End: 2017-07-29

## 2017-07-26 RX ORDER — ONDANSETRON 2 MG/ML
4 INJECTION INTRAMUSCULAR; INTRAVENOUS EVERY 30 MIN PRN
Status: DISCONTINUED | OUTPATIENT
Start: 2017-07-26 | End: 2017-07-29

## 2017-07-26 RX ORDER — SODIUM CHLORIDE, SODIUM LACTATE, POTASSIUM CHLORIDE, CALCIUM CHLORIDE 600; 310; 30; 20 MG/100ML; MG/100ML; MG/100ML; MG/100ML
INJECTION, SOLUTION INTRAVENOUS CONTINUOUS
Status: DISCONTINUED | OUTPATIENT
Start: 2017-07-26 | End: 2017-07-26

## 2017-07-26 RX ORDER — FENTANYL CITRATE 50 UG/ML
INJECTION, SOLUTION INTRAMUSCULAR; INTRAVENOUS PRN
Status: DISCONTINUED | OUTPATIENT
Start: 2017-07-26 | End: 2017-07-26

## 2017-07-26 RX ORDER — SODIUM CHLORIDE 9 MG/ML
INJECTION, SOLUTION INTRAVENOUS CONTINUOUS PRN
Status: DISCONTINUED | OUTPATIENT
Start: 2017-07-26 | End: 2017-07-26

## 2017-07-26 RX ADMIN — PROPOFOL 60 MCG/KG/MIN: 10 INJECTION, EMULSION INTRAVENOUS at 19:03

## 2017-07-26 RX ADMIN — CISATRACURIUM BESYLATE 10 MG: 2 INJECTION INTRAVENOUS at 18:40

## 2017-07-26 RX ADMIN — PANTOPRAZOLE SODIUM 40 MG: 40 TABLET, DELAYED RELEASE ORAL at 08:21

## 2017-07-26 RX ADMIN — CISATRACURIUM BESYLATE 10 MG: 2 INJECTION INTRAVENOUS at 19:10

## 2017-07-26 RX ADMIN — FENTANYL CITRATE 50 MCG: 50 INJECTION, SOLUTION INTRAMUSCULAR; INTRAVENOUS at 21:30

## 2017-07-26 RX ADMIN — MEROPENEM 1 G: 1 INJECTION, POWDER, FOR SOLUTION INTRAVENOUS at 14:53

## 2017-07-26 RX ADMIN — GANCICLOVIR SODIUM 250 MG: 500 INJECTION, POWDER, LYOPHILIZED, FOR SOLUTION INTRAVENOUS at 17:41

## 2017-07-26 RX ADMIN — CISATRACURIUM BESYLATE 10 MG: 2 INJECTION INTRAVENOUS at 20:50

## 2017-07-26 RX ADMIN — MEROPENEM 1 G: 1 INJECTION, POWDER, FOR SOLUTION INTRAVENOUS at 02:20

## 2017-07-26 RX ADMIN — GANCICLOVIR SODIUM 450 MG: 500 INJECTION, POWDER, LYOPHILIZED, FOR SOLUTION INTRAVENOUS at 05:05

## 2017-07-26 RX ADMIN — MICAFUNGIN SODIUM 100 MG: 10 INJECTION, POWDER, LYOPHILIZED, FOR SOLUTION INTRAVENOUS at 12:40

## 2017-07-26 RX ADMIN — PROPOFOL 55 MCG/KG/MIN: 10 INJECTION, EMULSION INTRAVENOUS at 08:50

## 2017-07-26 RX ADMIN — ACETAMINOPHEN 650 MG: 325 TABLET, FILM COATED ORAL at 12:35

## 2017-07-26 RX ADMIN — PROPOFOL 55 MCG/KG/MIN: 10 INJECTION, EMULSION INTRAVENOUS at 05:15

## 2017-07-26 RX ADMIN — DAPTOMYCIN 750 MG: 500 INJECTION, POWDER, LYOPHILIZED, FOR SOLUTION INTRAVENOUS at 16:34

## 2017-07-26 RX ADMIN — FENTANYL CITRATE 50 MCG: 50 INJECTION, SOLUTION INTRAMUSCULAR; INTRAVENOUS at 20:23

## 2017-07-26 RX ADMIN — MULTIVIT AND MINERALS-FERROUS GLUCONATE 9 MG IRON/15 ML ORAL LIQUID 15 ML: at 08:21

## 2017-07-26 RX ADMIN — FENTANYL CITRATE 50 MCG: 50 INJECTION, SOLUTION INTRAMUSCULAR; INTRAVENOUS at 20:10

## 2017-07-26 RX ADMIN — ALBUMIN (HUMAN) 25 G: 12.5 SOLUTION INTRAVENOUS at 17:38

## 2017-07-26 RX ADMIN — SODIUM CHLORIDE: 9 INJECTION, SOLUTION INTRAVENOUS at 19:00

## 2017-07-26 RX ADMIN — FLUDROCORTISONE ACETATE 100 MCG: 0.1 TABLET ORAL at 08:21

## 2017-07-26 RX ADMIN — FENTANYL CITRATE 100 MCG: 50 INJECTION, SOLUTION INTRAMUSCULAR; INTRAVENOUS at 18:49

## 2017-07-26 RX ADMIN — CISATRACURIUM BESYLATE 10 MG: 2 INJECTION INTRAVENOUS at 18:49

## 2017-07-26 RX ADMIN — PROPOFOL 60 MCG/KG/MIN: 10 INJECTION, EMULSION INTRAVENOUS at 12:31

## 2017-07-26 RX ADMIN — ALBUMIN (HUMAN) 25 G: 12.5 SOLUTION INTRAVENOUS at 12:12

## 2017-07-26 RX ADMIN — MIDAZOLAM HYDROCHLORIDE 1 MG: 1 INJECTION, SOLUTION INTRAMUSCULAR; INTRAVENOUS at 18:40

## 2017-07-26 RX ADMIN — PROPOFOL 55 MCG/KG/MIN: 10 INJECTION, EMULSION INTRAVENOUS at 02:20

## 2017-07-26 RX ADMIN — CISATRACURIUM BESYLATE 10 MG: 2 INJECTION INTRAVENOUS at 20:11

## 2017-07-26 RX ADMIN — ALBUMIN (HUMAN) 25 G: 12.5 SOLUTION INTRAVENOUS at 00:26

## 2017-07-26 RX ADMIN — CISATRACURIUM BESYLATE 6 MG: 2 INJECTION INTRAVENOUS at 21:45

## 2017-07-26 RX ADMIN — PROPOFOL 60 MCG/KG/MIN: 10 INJECTION, EMULSION INTRAVENOUS at 18:18

## 2017-07-26 RX ADMIN — Medication 80 MG: at 08:21

## 2017-07-26 RX ADMIN — ALBUMIN (HUMAN) 25 G: 12.5 SOLUTION INTRAVENOUS at 06:22

## 2017-07-26 RX ADMIN — HEPARIN SODIUM 5000 UNITS: 5000 INJECTION, SOLUTION INTRAVENOUS; SUBCUTANEOUS at 12:17

## 2017-07-26 RX ADMIN — SODIUM CHLORIDE: 9 INJECTION, SOLUTION INTRAVENOUS at 10:50

## 2017-07-26 NOTE — PROGRESS NOTES
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 Readmitted to ICU 7/26 due to respiratory failure and reintubated. CT scan showing retroperitoneal air concerning for perforation.    Changes/Events 7/26:  - Recheck Hgb this AM, ordered now.  Transfuse 1 unit PRBCs.  - Discuss with Transplant and Colorectal about possible return to OR.    - Obtain abdominal xray to follow contrast extravasation.  Follow up with transplant to come up with a plan.  - Consider drain placement if no OR is indicated      PLAN:  Summary of Events  7/4 negative ex-lap  7/5 washout and closure  7/9 pigtail catheter placed (removed 7/12)  7/12 Bronchoscopy  7/14 Paracentesis, negative SBP  7/17 Transfer to floor  7/25 Transfer to ICU due to respiratory failure     PLAN:     Neuro/ pain/ sedation:  - Monitor neurological status. Notify the MD for any acute changes in exam.  - Propofol gtt  - PRN Dilaudid  - Neurology recommending no LP at this time     CV:   #Sinus tachycardia without hypotension   - No need for pressors at this time  - Continuous cardiac monitoring.   #History of Hypertension  - Hold PTA anti-hypertensives     Pulm:   #Acute respiratory failure  #Bilateral pleural effusions, pulmonary edema  - Ventilated at 16/500/8  - P/S trials BID  - PRN Duo-nebs     FEN/ GI:   #Retroperitoneal air, concern for perforation  -Colorectal and transplant surgery both observing at this time  -Abdominal x-ray  #Need for nutritional supplementation  - ICU electrolyte replacement protocol  - NJ in place  - TF at goal     Graft Function/Immunosuppression:  #s/p Liver transplant  - Unremarkable liver ultrasound,  last 7/5 was normal  #Need for immunosuppression  - CellCept 250 mg BID: Held since 6/27/17 due to neutropenia..   -  Prograf held per transplant due to EJ  - Fludricortisone 100 mcg BID     Renal/fluids:  - Vazquez catheter to stay in place for strict intake and output monitoring  - MIVF @100ml/hr  # EJ  # Intraabdominal hypertension with concern for compartment syndome  - Low urine output, vazquez to remain for strict I&O measurement (decreased from 22 to 11 cm water after large volume paracentesis)   - Creatinine elevated to 3.1 from 1.6 baseline  - No indications for dialysis at this time, likely tomorrow  - Nephrology consulted  - Monitor electrolytes     Endocrine:   #Diabetes mellitus T2  - Insulin gtt     ID/ Abx:  #Severe Sepsis, concern for abdominal source  #Fever  - paracentesis with 5L of yellow turbid fluid drained, sent for cultures and routine ascites labs  - UA, Ucx, blood cultures, chest x-ray  - CT chest, abdomen, and pelvis  - Meropenem, Daptomycin, Micafungin IV per IC   - transplant ID following, appreciates recs  - EBV PCR every other week.   #CMV viremia, +Donor/-recipient transplant  - CMV D+R-, low viral count. MMF held.   - Increase IV Ganciclovir 7/25/2017 per ID (started 07/20).   - Repeat CMV PCR 7/27/2017.      Heme: Hemoglobin stable. Will recheck in AM or earlier if there is clinical evidence of active bleeding.  - Daily CBC  # Right brachial arterial clot from art line  - Daily ASA  # Distal extremity ischemia secondary to pressors  - Monitor for signs of infection      Prophylaxis:    - DVT: SCDs, SubQ Heparin  - GI: Protonix      MSK:  - PT/OT     Lines/Drains:  -PIV x2, Vazquez     Disposition: Surgical ICU     Patient seen, findings and plan discussed with surgical ICU staff, Dr. Feliz.     Walter Topete, MS4    Arden Page MD  PGY-2 General Surgery          ====================================    TODAY'S PROGRESS:   SUBJECTIVE:   - Retroperitoneal air on CT. Colorectal  consulted. Transplant surgery aware. hemodynically stable.      OBJECTIVE:   1. VITAL SIGNS:   Temp:  [98.8  F (37.1  C)-102.9  F (39.4  C)] 102  F (38.9  C)  Pulse:  [90] 90  Heart Rate:  [] 94  Resp:  [16-28] 23  BP: ()/(45-96) 135/69  FiO2 (%):  [40 %-100 %] 40 %  SpO2:  [92 %-100 %] 97 %  Ventilation Mode: CMV/AC  FiO2 (%): 40 %  Rate Set (breaths/minute): 16 breaths/min  Tidal Volume Set (mL): 500 mL  PEEP (cm H2O): 8 cmH2O  Oxygen Concentration (%): 40 %  Resp: 23    2. INTAKE/ OUTPUT:   I/O last 3 completed shifts:  In: 4056.35 [I.V.:1791.35; NG/GT:900; IV Piggyback:1000]  Out: 6159 [Urine:384; Emesis/NG output:775; Drains:5000]    3. PHYSICAL EXAMINATION:   - Gen: adult male Intubated and sedated  - HEENT: MMM, ETT in place, OG with bilious output   - CVS: NR/RR, no murmurs noted.  - Pulm: CTA on right, ventilated. Wheezes and course breath sounds on the left.  - Abd: soft, non-tender, distended, no guarding  - MSK/Neuro:  - Ext: warm, well-perfused; peripheral edema 2+, distal pulses b/l. Dusky tips on the right index and right large toe   - Incision: C/D/I without surrounding erythema, drainage, or fluctuance; staples in place for midline incision. Suture in place in LLQ at prior paracentesis site c/d/i.    4. INVESTIGATIONS:   Arterial Blood Gases     Recent Labs  Lab 07/25/17  1746 07/25/17  1037   PH 7.32* 7.14*   PCO2 42 69*   PO2 81 103   HCO3 22 23     Complete Blood Count     Recent Labs  Lab 07/26/17  1150 07/26/17  0522 07/26/17  0431 07/25/17  1651 07/25/17  0945 07/24/17  0705   WBC  --   --  7.7 10.3 11.9* 6.4   HGB 6.7* 5.9* 5.9* 7.5* 8.8* 7.8*   PLT  --   --  109* 141* 168 126*     Basic Metabolic Panel    Recent Labs  Lab 07/26/17  0431 07/25/17  1651 07/25/17  0945 07/24/17  0705    138 137 142   POTASSIUM 3.5 4.4 4.0 3.8   CHLORIDE 108 106 104 112*   CO2 23 24 26 24   BUN 74* 79* 77* 65*   CR 3.09* 2.90* 2.60* 1.88*   * 176* 382* 226*     Liver Function  Tests    Recent Labs  Lab 07/25/17  1651 07/25/17  0945 07/24/17  0705 07/23/17  0625 07/21/17  1230   AST 61* 90* 86* 63*  --    ALT 50 60 54 47  --    ALKPHOS 242* 317* 264* 224*  --    BILITOTAL 0.6 0.5 0.4 0.3  --    ALBUMIN 2.0* 1.9* 1.7* 1.9*  --    INR 1.23*  --  1.11  --  1.24*     Pancreatic Enzymes  No lab results found in last 7 days.  Coagulation Profile    Recent Labs  Lab 07/25/17  1651 07/24/17  0705 07/21/17  1230   INR 1.23* 1.11 1.24*   PTT 41*  --   --      Lactate  Invalid input(s): LACTATE    5. RADIOLOGY:   Recent Results (from the past 24 hour(s))   XR Chest Port 1 View    Narrative    EXAM: XR CHEST PORT 1 VW  7/25/2017 1:30 PM      HISTORY: endotracheal tube positioning    COMPARISON: Chest radiograph the earlier today, 7/18/2017, CT  7/13/2017    FINDINGS:     Single AP view of the chest.     Endotracheal tube tip projects approximately 4.8 cm above the chacorta.    Stable large cardiomediastinal silhouette. Persistent pulmonary edema    Persistent moderate bilateral pleural effusion with overlying  atelectasis/consolidation. No pneumothorax.       Impression    IMPRESSION:   New endotracheal tube tip projects approximately 4.8 cm above the  chacorta. Persistent pulmonary edema. Persistent left lung base  atelectasis/consolidation.    I have personally reviewed the examination and initial interpretation  and I agree with the findings.    DAMIEN LIRA MD   CT Chest Abdomen Pelvis w/o Contrast   Result Value    Radiologist flags Retroperitoneal gas (Urgent)    Narrative    EXAMINATION: CT CHEST ABDOMEN PELVIS W/O CONTRAST, 7/25/2017 5:32 PM    TECHNIQUE:  Helical CT images from the thoracic inlet through the  symphysis pubis were obtained without contrast.     COMPARISON: CT 7/13/2017    HISTORY: evaluate for infection    FINDINGS:    Chest: Endotracheal tube tip 4.8 cm from the chacorta. Gastric tube tip  in the gastric fundus. Feeding tube tip near the ligament of Treitz.    Partially  visualized thyroid is normal. Scattered prominent  mediastinal lymph nodes, for example a 12 mm right paratracheal lymph  node (axial series 5, image 23). Heart is mildly enlarged. Hypodense  blood pool, compatible with clinical history of anemia. No pericardial  effusion.    Small bilateral pleural effusions. No pneumothorax. Bibasilar  atelectasis versus consolidation.    Abdomen and pelvis: Postsurgical changes of liver transplant. No focal  liver lesion. The spleen is enlarged measuring 22 cm in the  midclavicular line. The adrenal glands, pancreas, kidneys, and small  bowel have a normal appearance on this nonenhanced study. Scattered  colonic diverticula. Infrarenal IVC filter.    In the right lower quadrant, there is a new 14.9 x 6.1 x 11.5 cm  ill-defined area of retroperitoneal gas which extends superiorly to  abut the second/third portions of the duodenum. No definite bowel  perforation is seen.    Large volume ascites with extensive mesenteric and soft tissue edema.  Innumerable mesenteric and retroperitoneal lymph nodes which are  likely reactive. Small hematoma in the ventral abdominal subcutaneous  fat (for example axial series 5, image 212).    Small amount of air in the bladder is likely related to Crowe  catheter.    Bones: No suspicious bony abnormality.      Impression    IMPRESSION:   1. New large heterogeneous area of right-sided retroperitoneal gas  which is highly concerning for an infectious process. Given the  history of prior neutropenic colitis and biopsies, there could also be  a component of stool from a ruptured viscus, despite the lack of  surrounding bowel loops and no extraluminal oral contrast. Surgical  consultation is recommended.     2. Bibasilar atelectasis versus consolidation with small bilateral  pleural effusions.    3. Extensive mesenteric and soft tissue edema with large volume  ascites. Numerous mesenteric and retroperitoneal lymph nodes which are  presumably  reactive.    4. Postsurgical changes of liver transplant.    [Urgent Result: Retroperitoneal gas]    Finding was identified on 7/25/2017 5:34 PM.     Dr. Santoyo was contacted by Dr. Atkins at 7/25/2017 5:37 PM and  verbalized understanding of the urgent finding.     I have personally reviewed the examination and initial interpretation  and I agree with the findings.    LUCI HOROWITZ MD   XR Chest Port 1 View    Narrative    XR CHEST PORT 1 VW 7/25/2017 6:56 PM    History: confirm RIJ central line    Comparison: CT and radiograph 7/25/2017    Findings: Right IJ catheter tip projects over the superior cavoatrial  junction. Gastric tube sidehole projects over the stomach. Enteric  tube courses in the stomach with the tip below the field of view.  Endotracheal tube tip projects over the upper thoracic trachea.    Cardiomegaly with unchanged dense bibasilar opacities and small  bilateral pleural effusions. Unchanged perihilar and peripheral  interstitial opacities. No pneumothorax. Surgical clips in the upper  abdomen.      Impression    Impression:   1. Right IJ catheter tip projects over the superior cavoatrial  junction.  2. Mild cardiomegaly with small bilateral pleural effusions.  3. Bibasilar atelectasis versus consolidation.    I have personally reviewed the examination and initial interpretation  and I agree with the findings.    LUCI HOROWITZ MD   US Liver Transplant    Narrative    EXAMINATION: US LIVER TRANSPLANT, 7/26/2017 12:25 AM     COMPARISON: CT cap 7/25/2017.    HISTORY: Sepsis, 4 month post liver transplant.    TECHNIQUE:  Gray-scale, color Doppler and spectral flow analysis.    FINDINGS:   Bilateral pericolic gutter ascites.    Liver:   The liver demonstrates normal homogeneous echotexture. No  evidence of a focal hepatic mass.     Bile Ducts: Both the intra- and extrahepatic biliary system are of  normal caliber.  The common bile duct measures 2.9 mm in diameter.    Gallbladder: The gallbladder  is surgically absent.    Kidneys:   Right kidney:  The right kidney demonstrates normal echotexture with  no evidence of a shadowing stone, focal mass or hydronephrosis.   12.5  cm in long axis dimension.  Left kidney:  The left kidney demonstrates normal echotexture with no  evidence of a shadowing stone, focal mass or hydronephrosis.   13.2 cm  in long axis dimension.    Pancreas: Obscured    Spleen: Spleen measures 22.2 cm. Visualized portions of the aorta are  unremarkable.    LIVER DOPPLER:  Splenic vein: Not visualized  Extrahepatic portal vein:  Patent continuous antegrade flow, 45  cm/sec.  Portal vein at anastomosis: Patent continuous antegrade flow, 131  cm/sec.  Intrahepatic portal vein:  Patent continuous antegrade flow, 91  cm/sec.  Right portal vein flow is antegrade, measuring 55 cm/sec.  Left portal vein flow is antegrade, measuring 57 cm/sec.    Inferior vena cava: patent with flow toward the heart throughout..  IVC above anastomosis:  162 cm/sec.  IVC at anastomosis:  121 cm/sec.  Intrahepatic IVC:  75 cm/sec.  Extrahepatic IVC:  39 cm/sec.    Right, mid, left hepatic veins: Patent with flow towards the inferior  vena cava.    Extrahepatic hepatic artery: Low resistance waveform with flow towards  the liver. 99 cm/sec with resistive index 0.84.  Right hepatic artery: 115 cm/sec with resistive index 0.75.  Left hepatic artery: 71 cm/sec with resistive index 0.79.      Impression    Impression:   1.  Unremarkable Doppler assessment of liver transplant.  2.  Left and right paracolic gutters ascites.  3.  Splenomegaly.    I have personally reviewed the examination and initial interpretation  and I agree with the findings.    LUCI HOROWITZ MD   XR Abdomen Port 1 View    Narrative    History: Colonic perforation.    COMPARISON: Single view of the abdomen 7/25/2017.    FINDINGS: Feeding tube present and in good position. IVC filter,  paramidline skin staples and upper abdominal surgical clips  again  noted. No gas-filled, dilated loops of large or small bowel. Oral  contrast material in the right colon and extending into the transverse  colon across midline. Stool burden appears to be moderate to large  with stool filling most of the cecum through the descending sigmoid  junction. No gross free intracranial air.      Impression    IMPRESSION: No evidence for bowel obstruction or gross free  intraperitoneal air. Moderate to large stool burden.    TESSA THOMAS MD       =========================================

## 2017-07-26 NOTE — PLAN OF CARE
Problem: Goal Outcome Summary  Goal: Goal Outcome Summary  OT4A:  Cancel PT this AM, pt with air in abdomen and potentially to OR today.  Rescheduled for 7/27

## 2017-07-26 NOTE — PROGRESS NOTES
Immunosuppression Note:    Camacho Bhagat is a 51 year old male who is seen today  for immunosuppression management     I, Jovan Tran MD, I have examined the patient with the resident/PA/Fellow, discussed and agree with the note and findings.  I have reviewed today's vital signs, medications, labs and imaging. I reviewed the immunosuppression medications and levels. 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         Graft Function: Good function. Alk phos 317. US liver in process. Check hepatic panel tomorrow.   Immunosuppression Management:   CellCept discontinued (6/27/17) due to neutropenia. .   Prograf goal ~8 due to single IS. 7/25 Level 13.1. Subtherapeutic despite frequent dose increases, now with increased level. HOLD for now due to EJ.   Cardiorespiratory: Acute respiratory distress with hypercapnia. Intubated.   CXR-pulmonary edema, moderate pleural effusions. CT scan chest, small b/l pleural effusions.  Hematology: Pancytopenia on admission, acute on chronic, secondary to medications (MMF, bactrim, valcyte). Improved with holding medications and neupogen. Possible due to CMV.  Started IV Ganciclovir 7/20.  Continue to hold MMF and bactrim.   -Anemia- HGB 5.9. Blood transfusion 1 unit 7/18. Transfuse 1 unit today.  -Cytology ascites fluid, negative for malignancy   GI: Neutropenic colitis.   -Concern for CMV colitis therefore requested colonoscopy. 7/21 Colonsocopy- poor prep, normal mucosa. Biopsy  results showing resolving injury secondary to possible drug induced vs infectious.   -7/25 developed respiratory distress and acute distended abdomen. Paracentesis with 5L turbid fluid removal (see ID below). CT scan abd/pelvis, po contrast, showed a new large heterogeneous right sided retroperitoneal gas and air tracking along duodenum.  No contrast extravasation.  No intraperitoneal air noted. Colorectal surgery consulted. Per colorectal staff, collection appears to be localized on CT. Plan to monitor status, continue antibiotics, repeat CT scan.   -NPO.   Fluid/Electrolytes/Renal: EJ oliguric likely sec to high intraabdominal pressures and ischemic ATN sec to sepsis. Nephrology consulted. No indication for RRT presently.   Endocrine: DM type 2. On insulin gtt.   Infectious disease: AMS, tachycardia, respiratory distress and acute abdominal distension. Paracentesis, send fluid for cx. CT scan chest, abd, pelvis. Bcx x 2, UC. Started on broad spectrum antibiotics due to concern for underlying intraabdominal infection. ID consulting.   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  CMV discordent with CMV PCR increasing. 7/20 CMV quant log 3.3. IS reduced (MMF discontinued).Started IV Ganciclovir 7/20 due to concern for CMV colitis, poor absorption (low tac level).   -T max 102.9. Colon perforation possible complication or recent colonoscopy. Appears localized. Plan to repeat CT scan.  -Ascites fluid gram stain with no organisms, WBC 86. PMNs 10%. Cx in process. BC NGTD.   -Continue meropenem, daptomycin, micafungin  -ID consulting.   Neuro: Delirium, slowly improving prior to events on 7/25. Propofol gtt for sedation. Oxycodone PRN.  Vascular: Right Arterial line clot 2/2 previous arterial line. Vascular consulted. Recommend ASA only. Some evidence of microvascular injury in digits (toes) this is most likely due to injury while patient was on pressor therapy and sepsis. Now with a third toe injury, vascular consulted  again. US completed.  ECHO EF 60-65%. 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   Activity: PT/OT  Prophylaxis: DVT-Heparin SQ  Disposition: SICU.    Medical Decision Making: Medium  Admit 54110 (moderate level decision making)    PILLO/Fellow/Resident Provider: Ada Driver, PAC [id number]    Faculty: Jovan Tran M.D.  __________________________________________________________________  Transplant History:.  3/4/2017 DD OLT with Dr. Tran (Liver), Postoperative day: 144     Interval History:   Overnight events: Tmax 102.9. HDS o/n.  Stable on vent. Abdomen less distended. +BM this AM, brown.     ROS:   A 10-point review of systems was negative except as noted above.    Meds:    meropenem  1 g Intravenous Q12H     DAPTOmycin (CUBICIN) intermittent infusion  8 mg/kg (Dosing Weight) Intravenous Q24H     micafungin  100 mg Intravenous Q24H     ganciclovir (CYTOVENE) intermittent infusion  5 mg/kg (Dosing Weight) Intravenous Q12H     albumin human  25 g Intravenous Q6H     protein modular  1 packet Per Feeding Tube BID     fludrocortisone  100 mcg Oral BID     aspirin  80 mg Oral Daily     insulin aspart   Subcutaneous TID w/meals     multivitamins with minerals  15 mL Per Feeding Tube Daily     heparin  5,000 Units Subcutaneous Q8H     pantoprazole  40 mg Oral or Feeding Tube Daily     sodium chloride (PF)  3 mL Intracatheter Q8H       Physical Exam:     Admit Weight: 94.2 kg (207 lb 11.2 oz) (SCALE 2)    Current vitals:   /65  Pulse 90  Temp 100.6  F (38.1  C) (Axillary)  Resp 20  Ht 1.829 m (6')  Wt 106.2 kg (234 lb 2.1 oz)  SpO2 98%  BMI 31.75 kg/m2    Vital sign ranges:    Temp:  [98.8  F (37.1  C)-102.9  F (39.4  C)] 100.6  F (38.1  C)  Pulse:  [] 90  Heart Rate:  [] 86  Resp:  [16-35] 20  BP: ()/() 137/65  FiO2 (%):  [40 %-100 %] 40 %  SpO2:  [84 %-100 %] 98 %  Patient Vitals for the past 24 hrs:   BP Temp Temp src Pulse Heart Rate Resp SpO2  Weight   07/26/17 0845 137/65 100.6  F (38.1  C) Axillary - 86 20 98 % -   07/26/17 0814 - - - - - - 99 % -   07/26/17 0800 125/57 100.4  F (38  C) Axillary - 89 20 98 % -   07/26/17 0730 126/62 - - - 89 20 99 % -   07/26/17 0700 137/63 99.5  F (37.5  C) - - 90 23 98 % -   07/26/17 0650 - 99.1  F (37.3  C) - - 88 - 97 % -   07/26/17 0645 125/63 - - - 89 - 97 % -   07/26/17 0640 125/63 98.8  F (37.1  C) - 90 - 22 - -   07/26/17 0630 108/77 - - - 85 - 96 % -   07/26/17 0600 119/62 - - - 90 20 96 % -   07/26/17 0530 130/67 - - - 85 - 95 % -   07/26/17 0500 123/71 - - - 88 19 96 % -   07/26/17 0430 121/63 - - - 86 - 98 % -   07/26/17 0400 121/61 99.8  F (37.7  C) Axillary - 90 22 97 % 106.2 kg (234 lb 2.1 oz)   07/26/17 0330 115/65 - - - 93 - 97 % -   07/26/17 0300 119/66 - - - 91 20 97 % -   07/26/17 0230 122/59 - - - 89 - 96 % -   07/26/17 0200 123/64 99.8  F (37.7  C) Axillary - 89 20 96 % -   07/26/17 0130 128/64 - - - 92 - 97 % -   07/26/17 0100 122/72 - - - 92 28 96 % -   07/26/17 0030 121/65 - - - 93 - 97 % -   07/26/17 0000 120/60 99.7  F (37.6  C) Axillary - 95 20 97 % -   07/25/17 2345 120/62 - - - 94 - 97 % -   07/25/17 2330 119/63 - - - 97 - 96 % -   07/25/17 2315 101/52 - - - 92 20 98 % -   07/25/17 2300 110/59 - - - 92 - 96 % -   07/25/17 2245 112/59 - - - 93 - 97 % -   07/25/17 2230 113/61 - - - 93 - 96 % -   07/25/17 2215 111/59 - - - 93 - 98 % -   07/25/17 2200 109/62 100.8  F (38.2  C) Axillary - 93 - 99 % -   07/25/17 2145 110/59 - - - 92 - 97 % -   07/25/17 2130 108/58 - - - 91 - 97 % -   07/25/17 2115 109/58 - - - 95 - 97 % -   07/25/17 2100 117/60 - - - 96 18 97 % -   07/25/17 2045 117/65 - - - 98 - 97 % -   07/25/17 2030 121/75 - - - 101 - 95 % -   07/25/17 2015 119/65 - - - 93 - 95 % -   07/25/17 2000 125/68 100.8  F (38.2  C) Axillary - 96 24 95 % -   07/25/17 1945 122/71 - - - 94 - 93 % -   07/25/17 1930 117/64 - - - 89 - 92 % -   07/25/17 1900 110/61 - - - 94 22 95 % -   07/25/17 1830 104/60 -  - - 93 - 96 % -   07/25/17 1800 (!) 103/96 101.5  F (38.6  C) Axillary - 94 22 97 % -   07/25/17 1730 90/45 - - - 101 16 95 % -   07/25/17 1700 - - - - 105 - 98 % -   07/25/17 1630 101/59 - - - 105 16 99 % -   07/25/17 1600 104/62 102.9  F (39.4  C) Axillary - 103 19 100 % -   07/25/17 1530 103/64 - - - 101 - 100 % -   07/25/17 1500 100/59 - - - 99 16 100 % -   07/25/17 1445 105/71 - - - 96 16 100 % -   07/25/17 1430 98/57 - - - 96 16 99 % -   07/25/17 1415 98/60 - - - 95 16 99 % -   07/25/17 1400 96/61 - - - 95 16 97 % -   07/25/17 1345 100/66 - - - 98 16 98 % -   07/25/17 1330 108/76 - - - 98 16 99 % -   07/25/17 1315 107/80 - - - 104 16 95 % -   07/25/17 1300 101/86 - - - 103 16 100 % -   07/25/17 1245 (!) 135/92 - - - 110 - 100 % -   07/25/17 1230 105/59 - - - 109 - 99 % -   07/25/17 1218 (!) 147/142 99  F (37.2  C) Axillary - 112 (!) 35 96 % -   07/25/17 1216 112/83 - - - - - - -   07/25/17 1215 - - - - 110 - 100 % -   07/25/17 1200 133/85 - - - 111 - (!) 84 % -   07/25/17 1120 - - - - - (!) 35 92 % -   07/25/17 1117 121/70 - - 102 102 (!) 34 91 % -   07/25/17 1025 - - - - - - 94 % -   07/25/17 1000 - - - - - - 93 % -     General Appearance: sedated  Skin: normal, warm, dry  Heart: tachycardic  Lungs: B/l course BS. On vent  Abdomen:soft, less distended. Midline incision, c/d/i. Chevron incision well healed.   : vazquez is present, small amount dk UO.   Extremities: BLE mild edema.  Neurologic: sedated.   CVC    Data:   CMP    Recent Labs  Lab 07/26/17  0431 07/25/17  1651 07/25/17  1335 07/25/17  0945    138  --  137   POTASSIUM 3.5 4.4  --  4.0   CHLORIDE 108 106  --  104   CO2 23 24  --  26   * 176*  --  382*   BUN 74* 79*  --  77*   CR 3.09* 2.90*  --  2.60*   GFRESTIMATED 21* 23*  --  26*   GFRESTBLACK 26* 28*  --  31*   JACOB 7.9* 8.0*  --  7.6*   MAG 1.9 1.9  --   --    PHOS 4.1 4.8*  --   --    ALBUMIN  --  2.0*  --  1.9*   BILITOTAL  --  0.6  --  0.5   ALKPHOS  --  242*  --  317*   AST  --   61*  --  90*   ALT  --  50  --  60   FAMY  --   --  8  --      CBC    Recent Labs  Lab 07/26/17  0522 07/26/17  0431 07/25/17  1651   HGB 5.9* 5.9* 7.5*   WBC  --  7.7 10.3   PLT  --  109* 141*     COAGS    Recent Labs  Lab 07/25/17  1651 07/24/17  0705   INR 1.23* 1.11   PTT 41*  --       Urinalysis  Recent Labs   Lab Test  07/25/17   1205  07/19/17   1150   06/14/17   1508   04/11/16   1345   COLOR  Dark Yellow  Yellow   < >   --    < >  Yellow   APPEARANCE  Cloudy  Slightly Cloudy   < >   --    < >  Clear   URINEGLC  70*  Negative   < >   --    < >  30*   URINEBILI  Negative  Negative   < >   --    < >  Negative   URINEKETONE  Negative  Negative   < >   --    < >  Negative   SG  1.014  1.009   < >   --    < >  1.016   UBLD  Moderate*  Moderate*   < >   --    < >  Small*   URINEPH  5.0  5.0   < >   --    < >  5.0   PROTEIN  100*  10*   < >   --    < >  30*   NITRITE  Negative  Negative   < >   --    < >  Negative   LEUKEST  Trace*  Negative   < >   --    < >  Negative   RBCU  >182*  6*   < >   --    < >  1   WBCU  5*  2   < >   --    < >  1   UTPG   --    --    --   1.55*   --   0.41*    < > = values in this interval not displayed.       Cultures:   Blood Cultures x2 7/4/2017 NGTD     Abdominal Fluid Culture 7/4/2017: NGTD    Abdominal Fluid Culture 7/5/17: NGTD    Bronchoalveolar Culture 7/5/17: VRE.     Sputum endotracheal gm stain/culture 7/11/17: >25 PMNs/low power field   Few Mixed gram positive elvie    CT scan:    7/4/2017 CONCLUSION:   1. Right colonic wall thickening suggesting colitis. Follow-up is necessary to confirm resolution in order to exclude colonic mass.  2. Transverse colon is not well distended reducing evaluation for wall thickening.  3. Small amount of ascites.  4. Splenomegaly.  5. Prominent portal and splenic veins. If confirmation of vascular patency is indicated consider follow-up ultrasound of the liver with Doppler.  6. Small right pleural effusion.  7. Stranding in the subcutaneous  "fat of the ventral pelvis.     Abdominal XR 7/4/2017:   1. No evidence of pneumoperitoneum.  2. Dilated loop of bowel in the central abdomen, likely sigmoid.     7/21/2017   SPECIMEN(S):   A: Colon biopsy, ascending   B: Colon biopsy, descending     FINAL DIAGNOSIS:   A. COLON BIOPSY, ASCENDING:   - Colonic mucosa with no significant histologic abnormality   - Negative for intraepithelial lymphocytosis or deposition of   subepithelial thick collagenous band     B. COLON BIOPSY, DESCENDING:   - Crypt architecture distortion, consistent with healed prior injury   - Negative for active inflammation or dysplasia   - Negative for intraepithelial lymphocytosis or deposition of   subepithelial thick collagenous band   - Please, see comment     COMMENT:   Specimen B, \"descending colon\" features lamina propria edema, slight   variation in crypt sizes and shapes and occasional crypt drop-out.  This   may indicate a resolving injury which could be drug-induced or   infectious.     CT scan 7/25/2017:   IMPRESSION:   1. New large heterogeneous area of right-sided retroperitoneal gas  which is highly concerning for an infectious process. Given the  history of prior neutropenic colitis and biopsies, there could also be  a component of stool from a ruptured viscus, despite the lack of  surrounding bowel loops and no extraluminal oral contrast. Surgical  consultation is recommended.      2. Bibasilar atelectasis versus consolidation with small bilateral  pleural effusions.     3. Extensive mesenteric and soft tissue edema with large volume  ascites. Numerous mesenteric and retroperitoneal lymph nodes which are  presumably reactive.     4. Postsurgical changes of liver transplant.     [Urgent Result: Retroperitoneal gas]     Finding was identified on 7/25/2017 5:34 PM.      Dr. Santoyo was contacted by Dr. Atkins at 7/25/2017 5:37 PM and  verbalized understanding of the urgent finding.      I have personally reviewed the examination " and initial interpretation  and I agree with the findings.     LUCI HOROWITZ MD

## 2017-07-26 NOTE — PLAN OF CARE
Problem: Sepsis (Adult)  Goal: Signs and Symptoms of Listed Potential Problems Will be Absent or Manageable (Sepsis)  Signs and symptoms of listed potential problems will be absent or manageable by discharge/transition of care (reference Sepsis (Adult) CPG).   Outcome: Declining  D/I: Pt transferred from  for altered mental status. Pt intubated and sedated. Bladder pressures were 20 upon transducer set up. Abdominal tap done, 5 liters of ascities drained. Sent to lab for cultures and various tests. Bladder pressure down to 10 post tap. Abd/plevis CT scan done. Free air on scan. Pt hypotensive post CT scan, MAP 58. Albumin 25% 100cc and NS 1 liter bolus. Central line placed. New [place] placed. UO 75cc this shift.   A: Pt possibly going to OR. MAP >65 after albumin and NS bolus (see VS flow sheets). Now has central access. Remains intubated and sedated.   P: Continue with fluid boluses per MD. Monitor VS and update MD with concerns. Await further plans for OR.

## 2017-07-26 NOTE — PROGRESS NOTES
Clinical details and CT reviewed. Suspect post colonoscopy perforation. Patient stable, start antibiotics and consult colorectal surgery. Hold prograf for now

## 2017-07-26 NOTE — DISCHARGE SUMMARY
"[facility], [place]    Discharge Summary  Transplant Surgery  I evaluated the patient today.  I reviewed the discharge plan with the fellow, and agree with the final assessment and plan for discharge as noted in the discharge summary.  I personally spent  30 minutes or less  today on discharge activities for this patient.      Jovan Tran MD, CHRISTIANO  Professor of Surgery  [employer]  Surgical Director of Liver Transplant Program  Executive Medical Director of Pediatric Transplantation    Date of Admission:  2017  Date of Discharge:  17  Discharging Provider: Dr. Jovan Galo/Felicia Tolliver NP, Evette Pennington NP    Discharge Diagnoses   1. Typhilitis; resolved.  2. Abdominal compartment syndrome; resolved.  3. Intraperitoneal abscess; resolving.    Findings/imagin17 CT scan:  \"Right colonic wall thickening suggesting colitis.  Transverse colon is not well distended reducing evaluation for wall thickening.  Small amount of ascites.  Splenomegaly.  Stranding in the subcutaneous fat of the ventral pelvis.\"    17 Procedure: Exploratory laparotomy and wash out  Surgeon: Tip Zhang M.D.  Findings:  Inflamed caecum and ascending colon    17 Procedure: Exploratory laparotomy, wash out and closure of abdominal incision  Surgeon: Tip Zhang M.D.   Findings:  Inflamed caecum and ascending colon, looks better than yesterday    17 Colonoscopy:  \"Entire colon filled with stool. > 50% of mucosal not able to be visualized. While the visualized mucosa appeared normal, it is certainly possible that large lesions including ulcers or polyps were missed. Random biopsies of the left and right colon were obtained.\"    17 CT scan:  \"New large heterogeneous area of right-sided retroperitoneal gas which is highly concerning for an infectious process. Extensive mesenteric and soft tissue edema with large volume ascites. Numerous " "mesenteric and retroperitoneal lymph nodes which are presumably reactive.\"    7/26/17 PROCEDURE: Exploratory laparotomy, lysis of adhesions, right hemicolectomy, end ileostomy, mucous fistula, partial omentectomy.  POSTOPERATIVE DIAGNOSIS: Retroperitoneal pericolonic air with signs of sepsis. History of colitis of unclear etiology. Recent colonoscopy with biopsies. No neida perforation and no colon ischemia. Indurated ascending colon.  SURGEON: Sara Dinh MD     8/9/17 Retroperitoneal drain placement CT guided: cx + clostridium septicum.    8/11/17 Paracentesis: WBC 5,038, drain cultures + clostridium septicum (day 6, broth only).     8/14/17 CT scan abd/pelvis: Complex gas-containing fluid collection in the right retroperitoneum decreased slightly in size, moderate ascites with intraperitoneal gas, peritonitis noted, no bowel leak.      8/15/17 Ultrasound-guided peritoneal drain placement: WBC 15,680, culture negative.    8/23/17 CT:  Fluid collections slightly larger.  Patient clinically worse with increased AMS.    8/24/17 IR  -Image guided placement of left intraperitoneal abscess drainage catheter.  -Image guided replacement of right retroperitoneal abscess drainage catheter.    8/25/17 Fluid culture from LLQ drain, budding yeast, pt doing well. Will monitor. Stopped Augmentin on 9/6.         History of Present Illness   Camacho TERESA Bhagat is a 52 year old male with history of CASTAÑEDA cirrhosis s/p liver transplant on 3/4/17. Admitted 7/4/17 from [facility] ED with sepsis secondary to colitis, taken to OR for initial ex-lap with findings of typhlitis in the right colon, wound left open with wound vac in place for re exploration and interval closure on 7/5/17. Concern for CMV colitis with low count CMV viremia, underwent colonoscopy on 7/21/17, biopsies obtained.  On 7/25/17 patient became septic with abdominal compartment syndrome.  CT noted new large heterogeneous right sided retroperitoneal gas and air " tracking along duodenum, and patient underwent an ex-lap, right angelique-colectomy, end ileostomy, mucus fistula, partial omentectomy on 7/26/17 by colorectal surgery.  Post-op course complicated by retroperitoneal abscess/peritonitis requiring percutaneous drainage.    Hospital Course      Liver graft: Good function. LFTs acceptable (checking every M, Th).     Immunosuppression Management:   CellCept discontinued (6/27/17) due to neutropenia.   Prograf goal 6-8.     Cardiorespiratory:   -Suspected RONNIE:  Uses O2 overnight only for brief apnea while sleeping per nursing.  Pt uses CPAP at home, machine is not currently working.  Called patient's home pulmonologist, patient will need to be seen in clinic before prescription may be renewed/new machine obtained.  -HTN: SBP 120s-160s. Continue amlodipine 10 mg daily. Pt on daily Florinef daily for hyperkalemia, and Lasix every other day for hyperkalemia and volume management.   -R/o HCAP:  New productive cough on 8/24, no fever, improved.  CXR: retrocardiac opacity.  Already on Augmentin.  MRSA swab negative.  Duonebs and mycomust x 2 days, completed.  Out of bed. Aggressive pulmonary toilet.    Hematology: Pancytopenia present on admission, persists.  Contributing factors include medications and CMV infection.  -Anemia- Hemoglobin 8.4 (7.9). Dropped below 7 8/26 with bloody ileostomy output, scope completed, see below. Blood transfusion on 8/26 (2 units). Hemoglobin dropped to 6.8 on 9/7, no evidence of active bleeding. Received an additional 1 unit of PRBCs on 9/7. PLT stable in the 70s range. HIT assay was negative. Stopped ASA (HAT ppx) as pt ~ 6 months from transplant.  -Leukopenia/neutropenia- WBC 3.7, was lower earlier in hospital stay. Received GCSF 8/20, 8/22-25.  -Thrombocytopenia-  Plt stable in the 70s range upon discharge. HIT assay negative. Stopped ASA (HAT ppx) as pt ~ 6 months from transplant.Previously dropped with linezolid.    Neutropenic enterocolitis  (typhlitis): On admission. Colonoscopy 7/21. Biopsy: resolving injury secondary to possible drug induced vs infectious.      Bowel perforation: 7/25 CT scan abd/pelvis-new large heterogeneous right sided retroperitoneal gas and air tracking along duodenum.  No contrast extravasation.  No intraperitoneal air noted. Colorectal surgery performed Ex lap, right angelique-colectomy, end ileostomy, mucus fistula, partial omentectomy on 7/26. Findings no neida perforation, phlegmon/inflammation right colon. Colon pathology suggested colon perforation, negative for malignancy; CMV stain positive.      Retroperitoneal abscess/peritonitis: CT C/A/P 8/9 showed a persistent gas/fluid collection RLQ, peritonitis, some free air but no evidence of contrast extravasation.  8/9 retroperitoneal drain placed, cx + clostridium septicum.  8/11 paracentesis: WBC 5,038, drain cultures + clostridium septicum (day 6, broth only).  8/14 CT scan abd/pelvis: Complex gas-containing fluid collection in the right retroperitoneum decreased slightly in size, moderate ascites with intraperitoneal gas, peritonitis noted, no bowel leak.  8/15 peritoneal drain placement: WBC 15,680, culture negative.  8/23 CT:  Fluid collections slightly larger.  Patient clinically worse with increased AMS.  8/24 IR replaced retroperitoneal drain, placed new 12F LLQ drain.  Patient improved clinically after drains placed.   8/25 fluid culture from LLQ drain, budding yeast, pt doing well no intervention.  Plan for sinogram of drains in 2 weeks.  Abx:  zosyn (8/9-8/22), linezolid (8/9-8/15), switched to Augmentin (8/22-9/6).     Malnutrition secondary to poor intake: Received tube feeding per [place] throughout most of hospital course.  Tube feeds were discontinued and patient was eating adequate amounts prior to discharge. Discharged on a low potassium diet with continued Ensure Clear supplements.    High output  ileostomy: Goal ileostomy output ~ 1200 cc in 24 hrs. Ostomy output  decreased with stopping tube feeding. On loperamide 4mg BID and 4 mg PO BID PRN ostomy output > 1L. Stopped Lomotil BID and fiber BID.  Will follow up with colorectal surgery outpatient after drains removed to discuss ileostomy reversal.    GI bleeding from ostomy while on heparin and ASA: Ileostomy output sanguinous liquid with clots 8/26. Ileoscopy and EGD 8/27 showed no active bleeding or ulceration, per GI bleeding suspected from ileostomy.  Ostomy output reddish brown. Heparin and ASA discontinued. At time of discharge, no evidence of bleeding from ostomy.    Acute renal injury: Secondary to ATN and sepsis. Now resolved, Cr 1.21 on day of discharge. Baseline Cr 0.9-1.1, has been slightly elevated in recent days. Suspect mild dehydration, and the patient was encouraged to push fluids.    Hypernatremia: Resolved with free water.      Hyperkalemia: Nephrology consulted earlier in hospitalization. 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  On bicarb, lasix, florinef, and low K diet.  Continue Florinef 0.1 mg and sodium bicarbonate.  Liberalized diet on 9/6/17, and K+ increased to 5.3 on day of discharge. Patient was instructed on the importance of limiting his potassium intake at home. Will be checking labs twice weekly.    Diabetes Mellitus type 2: Endocrine were consulted for glycemic management. Discharged on SSI QID and carb coverage with meals, as well as low-dose Lantus (5 units qhs). Patient will follow-up with his outpatient endocrinologist for further diabetic management.    CMV viremia: Primary CMV infection.  (CMV R-D+) CMV colitis per pathology, peak serum 7/15 4225 IU/ml.   IV Ganciclovir (started 7/20).  CMV count fell to <137. Decreased Ganciclovir to 5mg/kg on 8/18.  Switched to valcyte 900mg daily 8/22, continue until 8/26.  Check CMV quant weekly until 9/24 and then every other week for 2 months. Last check 9/7 CMV not detected.    EBV viremia:  Peak serum 406,697 copies/ml on 7/18.   753 copies/ml on 8/15.  Check  quant monthly, due 9/15.    Cellulitis/dry gangrene:  Right 1st finger, on linezolid (8/19- 8/22), switched to doxycycline x7 days (8/22-8/28).  Clinically resolved.    Severe sepsis secondary to typhlitis: On admission secondary to typhlitis. Meropenem/daptomycin/micafungin x 10 days completed on 8/3. S/p right angelique-colectomy. Path: CMV stain +, perforation of colon noted.     Retroperitoneal abscess/peritonitis: CT C/A/P 8/9 showed a persistent gas/fluid collection RLQ, peritonitis, some free air but no evidence of contrast extravasation.       Completed antibiotics:  Zosyn (8/9-8/22), linezolid (8/9-8/15), and Augmentin (8/22-9/6).     Neuro: Toxic metabolic encephalopathy secondary to infection, prolonged hospital stay.  Improved throughout the course of his hospitalization. Limited medications that may affect mental status.    Microvascular ischemic injury:  Microvascular ischemic injury in digits (toes, finger) this is most likely due to injury while patient was on pressor therapy with sepsis. Erythema right 1st finger documented and marked, now resolved.     Discharge Disposition   Discharged to home   Condition at discharge: Stable    Pending Results   These results will be followed up by the patient's transplant coordinator.  Unresulted Labs Ordered in the Past 30 Days of this Admission     Date and Time Order Name Status Description    9/7/2017 1501 Haptoglobin In process     7/15/2017 1553 Pneumocystis jirovecil by PCR In process     7/14/2017 1433 AFB Culture Non Blood Preliminary         Primary Care Physician   Shay Kirkpatrick    Physical Exam   Temp: 98.8  F (37.1  C) Temp src: Oral BP: (!) 161/92 Pulse: 93 Heart Rate: 93 Resp: 16 SpO2: 92 % O2 Device: None (Room air) Oxygen Delivery: 2 LPM  Vitals:    09/05/17 0900 09/06/17 0900 09/08/17 0940   Weight: 95.5 kg (210 lb 9.6 oz) 94 kg (207 lb 4.8 oz) 93.1 kg (205 lb 3.2 oz)     Vital Signs with Ranges  Temp:  [98  F (36.7  C)-100.4  F (38  C)] 98.8  F  (37.1  C)  Pulse:  [90-93] 93  Heart Rate:  [88-93] 93  Resp:  [16-20] 16  BP: (156-167)/(81-92) 161/92  SpO2:  [92 %-98 %] 92 %  I/O last 3 completed shifts:  In: 110 [I.V.:40; Other:70]  Out: 2385 [Urine:1750; Drains:160; Stool:475]    General Appearance: NAD  Skin: normal, warm, dry  Heart: RRR, well perfused  Lungs: Nonlabored resps on RA  Abdomen: soft, rounded, mild tender LQ unchanged. Ileostomy liquid brown, no signs of bleeding from stoma.  Mucus fistula covered. Midline incision c/d/i.  Right abdominal drains: both serous.  LLQ drain: serous.  : No vazquez, good UOP  Extremities: No edema. R index finger with necrotic finger.  Necrotic blackened areas on right 1st & 2nd toes and left 2nd toe.  Neurologic: A&O x4. No tremor    Consultations This Hospital Stay   PHARMACY TO DOSE VANCO  VASCULAR ACCESS CARE ADULT IP CONSULT  PHARMACY TO DOSE VANCO  PHARMACY/NUTRITION TO START AND MANAGE TPN  PHARMACY IP CONSULT  PHARMACY IP CONSULT  NEPHROLOGY ICU IP CONSULT  PHARMACY IP CONSULT  HEMATOLOGY IP CONSULT  INFECTIOUS DISEASE TRANSPLANT SOT ADULT IP CONSULT  PHARMACY IP CONSULT  PHYSICAL THERAPY ADULT IP CONSULT  OCCUPATIONAL THERAPY ADULT IP CONSULT  NUTRITION SERVICES ADULT IP CONSULT  NUTRITION SERVICES ADULT IP CONSULT  NUTRITION SERVICES ADULT IP CONSULT  NUTRITION SERVICES ADULT IP CONSULT  NUTRITION SERVICES ADULT IP CONSULT  PHARMACY IP CONSULT  NEUROLOGY CRITICAL CARE ADULT IP CONSULT  PHARMACY IP CONSULT  VASCULAR SURGERY ADULT IP CONSULT  VASCULAR ACCESS ADULT IP CONSULT  VASCULAR ACCESS CARE ADULT IP CONSULT  SPEECH LANGUAGE PATH ADULT IP CONSULT  ENDOCRINE DIABETES ADULT IP CONSULT  WOUND OSTOMY CONTINENCE NURSE  IP CONSULT  VASCULAR ACCESS CARE ADULT IP CONSULT  VASCULAR ACCESS CARE ADULT IP CONSULT  VASCULAR ACCESS CARE ADULT IP CONSULT  GI LUMINAL ADULT IP CONSULT  PHARMACY IP CONSULT  VASCULAR ACCESS CARE ADULT IP CONSULT  NEPHROLOGY GENERAL ADULT IP CONSULT  VASCULAR SURGERY ADULT IP  CONSULT  VASCULAR ACCESS CARE ADULT IP CONSULT  VASCULAR ACCESS CARE ADULT IP CONSULT  PHARMACY IP CONSULT  NEPHROLOGY ICU IP CONSULT  NUTRITION SERVICES ADULT IP CONSULT  NUTRITION SERVICES ADULT IP CONSULT  NUTRITION SERVICES ADULT IP CONSULT  NUTRITION SERVICES ADULT IP CONSULT  COLORECTAL SURGERY ADULT IP CONSULT  WOUND OSTOMY CONTINENCE NURSE  IP CONSULT  WOUND OSTOMY CONTINENCE NURSE  IP CONSULT    Time Spent on this Encounter   I have spent greater than 30 minutes on this discharge.    Discharge Orders     Discharge Medications    Current Discharge Medication List      START taking these medications    Details   acetaminophen (TYLENOL) 325 MG tablet Take 2 tablets (650 mg) by mouth every 4 hours as needed for mild pain  Qty: 100 tablet, Refills: 3    Associated Diagnoses: Osteoarthritis, unspecified osteoarthritis type, unspecified site      loperamide (IMODIUM) 2 MG capsule Take 2 capsules (4 mg) by mouth 2 times daily  Qty: 120 capsule, Refills: 3    Associated Diagnoses: S/P right hemicolectomy      miconazole with skin protectant (LEXI ANTIFUNGAL) 2 % CREA cream Apply topically 2 times daily  Qty: 1 Tube, Refills: 0    Associated Diagnoses: Liver transplant recipient (H)      furosemide (LASIX) 20 MG tablet Take 1 tablet (20 mg) by mouth every other day  Qty: 30 tablet, Refills: 3    Associated Diagnoses: Hyperkalemia      pantoprazole (PROTONIX) 40 MG EC tablet Take 1 tablet (40 mg) by mouth daily  Qty: 30 tablet, Refills: 5    Associated Diagnoses: S/P liver transplant (H)      sodium bicarbonate 650 MG tablet Take 1 tablet (650 mg) by mouth 3 times daily  Qty: 90 tablet, Refills: 5    Associated Diagnoses: Liver transplant recipient (H)      sodium chloride, PF, 0.9% PF flush 10 mLs by Intracatheter route 2 times daily  Qty: 1200 mL, Refills: 1    Comments: Need 10cc syringes of prefilled Luer lock syringes for discharge.  Associated Diagnoses: Intra-abdominal abscess (H)      tacrolimus (GENERIC  EQUIVALENT) 0.5 MG capsule Take 1.5mg twice daily.  Qty: 60 capsule, Refills: 11    Associated Diagnoses: Liver transplant recipient (H)         CONTINUE these medications which have CHANGED    Details   amLODIPine (NORVASC) 5 MG tablet Take 2 tablets (10 mg) by mouth daily  Qty: 60 tablet, Refills: 3    Associated Diagnoses: History of liver transplant (H); Elevated blood pressure reading; Long-term use of immunosuppressant medication      fludrocortisone (FLORINEF) 0.1 MG tablet Take 1 tablet (0.1 mg) by mouth daily For elevated potassium  Qty: 30 tablet, Refills: 3    Associated Diagnoses: History of liver transplant (H); Long-term use of immunosuppressant medication      cyclobenzaprine (FLEXERIL) 10 MG tablet Take 1 tablet (10 mg) by mouth 3 times daily as needed for muscle spasms  Qty: 90 tablet, Refills: 0    Associated Diagnoses: Immunosuppression (H)      insulin glargine (LANTUS) 100 UNIT/ML injection Inject 5 Units Subcutaneous At Bedtime  Qty: 3 mL, Refills: 1    Associated Diagnoses: Liver transplant recipient (H)      tacrolimus (GENERIC EQUIVALENT) 1 MG capsule Take 1.5mg twice daily.  Qty: 60 capsule, Refills: 11    Associated Diagnoses: Liver transplant recipient (H)      !! insulin aspart (NOVOLOG FLEXPEN) 100 UNIT/ML injection Inject 1-10 Units Subcutaneous 3 times daily (with meals) Pre-Meal  - 164 give 1 unit.    - 189 give 2 units.    - 214 give 3 units.    - 239 give 4 units.    - 264 give 5 units.    - 289 give 6 units.    - 314 give 7 units.    - 339 give 8 units.    - 364 give 9 units.  BG greater than or equal to 365 give 10 units  Qty: 3 mL, Refills: 3    Associated Diagnoses: Type 2 diabetes mellitus with diabetic polyneuropathy, unspecified long term insulin use status (H)      !! insulin aspart (NOVOLOG FLEXPEN) 100 UNIT/ML injection Inject 1-7 Units Subcutaneous At Bedtime  - 224 give 1 units.    - 249 give 2 units.     - 274 give 3 units.    - 299 give 4 units.    - 324 give 5 units.    - 349 give 6 units.   BG greater than or equal to 350 give 7 units.  Qty: 3 mL, Refills: 3    Associated Diagnoses: Type 2 diabetes mellitus with diabetic polyneuropathy, unspecified long term insulin use status (H)      !! insulin aspart (NOVOLOG PEN) 100 UNIT/ML injection DOSE:  1 units per 10 grams of carbohydrate. With meals and snacks. Only chart total amount of units given.  Do not give if pre-prandial glucose is less than 60 mg/dL.  Qty: 3 mL, Refills: 3    Associated Diagnoses: Liver transplant recipient (H)       !! - Potential duplicate medications found. Please discuss with provider.      CONTINUE these medications which have NOT CHANGED    Details   tadalafil (CIALIS) 5 MG tablet Take 1 tablet (5 mg) by mouth daily Never use with nitroglycerin, terazosin or doxazosin.  Qty: 30 tablet, Refills: 11    Associated Diagnoses: Drug-induced erectile dysfunction      prochlorperazine (COMPAZINE) 5 MG tablet Take 1-2 tablets (5-10 mg) by mouth every 6 hours as needed for nausea or vomiting  Qty: 90 tablet, Refills: 0    Associated Diagnoses: Aftercare following organ transplant; Status post liver transplantation (H)      ondansetron (ZOFRAN-ODT) 4 MG ODT tab Take 1 tablet (4 mg) by mouth every 8 hours as needed for nausea  Qty: 30 tablet, Refills: 0    Associated Diagnoses: Cirrhosis of liver with ascites, unspecified hepatic cirrhosis type (H); Nausea         STOP taking these medications       clotrimazole (MYCELEX) 10 MG LOZG lozenge Comments:   Reason for Stopping:         senna-docusate (SENOKOT-S;PERICOLACE) 8.6-50 MG per tablet Comments:   Reason for Stopping:         gabapentin (NEURONTIN) 300 MG capsule Comments:   Reason for Stopping:         mycophenolate (CELLCEPT - GENERIC EQUIVALENT) 250 MG capsule Comments:   Reason for Stopping:         sulfamethoxazole-trimethoprim (BACTRIM/SEPTRA) 400-80 MG per tablet  Comments:   Reason for Stopping:         Linagliptin (TRADJENTA PO) Comments:   Reason for Stopping:         omeprazole (PRILOSEC) 20 MG CR capsule Comments:   Reason for Stopping:         magnesium oxide (MAG-OX) 400 MG tablet Comments:   Reason for Stopping:         lidocaine (LIDODERM) 5 % Patch Comments:   Reason for Stopping:         LACTOBACILLUS EXTRA STRENGTH CAPS Comments:   Reason for Stopping:         oxyCODONE (ROXICODONE) 5 MG IR tablet Comments:   Reason for Stopping:         aspirin 325 MG tablet Comments:   Reason for Stopping:         valGANciclovir (VALCYTE) 450 MG tablet Comments:   Reason for Stopping:         multivitamin, therapeutic with minerals (THERA-VIT-M) TABS tablet Comments:   Reason for Stopping:         glucagon 1 MG SOLR injection Comments:   Reason for Stopping:               Data   Most Recent 3 CBC's:  Recent Labs   Lab Test  09/08/17   0657  09/07/17   1610  09/07/17   0944  09/07/17   0805   WBC  3.7*  3.8*   --   4.0   HGB  8.4*  7.9*  7.9*  6.8*  5.5*   MCV  91  91   --   91   PLT  74*  74*   --   71*      Most Recent 3 BMP's:  Recent Labs   Lab Test  09/08/17   0657  09/07/17   0805  09/06/17   0706   NA  134  132*  133   POTASSIUM  5.3  4.8  4.7   CHLORIDE  104  104  101   CO2  24  23  24   BUN  27  26  28   CR  1.21  1.33*  1.37*   ANIONGAP  6  6  9   JACOB  8.3*  7.7*  8.0*   GLC  111*  200*  106*     Most Recent 2 LFT's:  Recent Labs   Lab Test  09/07/17   0805  09/04/17   0605   AST  26  43   ALT  12  15   ALKPHOS  196*  213*   BILITOTAL  0.4  0.8     Most Recent INR's and Anticoagulation Dosing History:  Anticoagulation Dose History     Recent Dosing and Labs Latest Ref Rng & Units 7/24/2017 7/25/2017 7/26/2017 7/26/2017 7/26/2017 8/5/2017 8/24/2017    INR 0.86 - 1.14 1.11 1.23(H) 1.30(H) 1.31(H) 1.31(H) 1.19(H) 1.12    Chromogenic Factor 10 70 - 130 % - - - - - - -        Most Recent 3 Troponin's:  Recent Labs   Lab Test  08/24/17   0848  08/08/17   0304  08/07/17    2132   TROPI  <0.015  0.020  0.018     Most Recent Cholesterol Panel:  Recent Labs   Lab Test  07/24/17   0705   02/08/16   1144   CHOL   --    --   141   LDL   --    --   61   HDL   --    --   60   TRIG  303*   < >  99    < > = values in this interval not displayed.     Most Recent 6 Bacteria Isolates From Any Culture (See EPIC Reports for Culture Details):  Recent Labs   Lab Test  08/25/17   1115  08/15/17   1135  08/11/17   1310  08/11/17   1010  08/09/17   1305  08/09/17   1155   CULT  On day 5, isolated in broth only:  Saccharomyces cerevisiae  *  Critical Value/Significant Value, preliminary result only, called to and read back by  Lu Root RN 7A 8/30/17 @1144 AV    No anaerobes isolated  No growth  Culture negative after 4 weeks  On day 6, isolated in broth only:  Clostridium septicum  Susceptibility testing not routinely done  *  Critical Value/Significant Value, preliminary result only, called to and read back by  ritchie hernandez rn 7a 8.17.17 @ 1043 sg    No growth  No growth  On day 2, isolated in broth only:  Clostridium septicum  *  Susceptibility testing not routinely done  On day 4, isolated in broth only: Anaerobic gram positive rods See anaerobic   report for identification  *  Canceled, Test credited Test reordered as correct code  No growth     Most Recent TSH, T4 and A1c Labs:  Recent Labs   Lab Test  07/06/17   0316   02/08/16   1144   04/11/11   1209   TSH   --    --   3.35   --   2.77   T4   --    --    --    --   1.23   A1C  Canceled, Test credited   Below Assay Range  NOTIFIED LEONARD ONEILL AT 0538 ON 7/6/17 BY [facility]     < >   --    < >   --     < > = values in this interval not displayed.     Results for orders placed or performed during the hospital encounter of 07/04/17   XR Abdomen Port 2 Views    Narrative    EXAM: XR ABDOMEN PORT 2 VW  7/4/2017 7:40 AM      HISTORY: to rule out intestinal perforation and free air    COMPARISON: X-ray 4/12/2017    FINDINGS: Portable AP views  of the abdomen obtained. Dilated loop of  bowel in the central abdomen, which appears to contain stool, likely  sigmoid. No evidence of free intraperitoneal air. Mild-to-moderate  colonic stool burden. Surgical clips in the right upper quadrant.  Degenerative changes of the lumbar spine.      Impression    IMPRESSION:   1. No evidence of pneumoperitoneum.  2. Dilated loop of bowel in the central abdomen, likely sigmoid.    I have personally reviewed the examination and initial interpretation  and I agree with the findings.    ROB STARK MD   XR Chest Port 1 View    Narrative    EXAM: XR CHEST PORT 1 VW  7/4/2017 2:13 PM      HISTORY: endotracheal tube positioning    COMPARISON: Chest x-ray 3/6/2017    FINDINGS: Portable AP view of the chest obtained. The endotracheal  tube tip projects 7 cm from the chacorta. Enteric tube courses beyond  the margins of film. Right IJ central line tip projects over the mid  SVC.    The trachea is midline. The cardiac silhouette is within normal  limits. Low lung volumes. Hazy opacification of the costophrenic  angles bilaterally. No pneumothorax. Mild left streaky opacities.      Impression    IMPRESSION:   1. Endotracheal tube tip projects 7 cm from the chacorta.  2. Bilateral pleural effusions with mild overlying opacities, most  likely atelectasis.    I have personally reviewed the examination and initial interpretation  and I agree with the findings.    ROB STARK MD   XR Chest Port 1 View    Narrative    EXAM: XR CHEST PORT 1   7/4/2017 6:48 PM      HISTORY: ET tube placement after 3cm advance    COMPARISON: Chest x-ray from earlier today 7/4/2017 2:06 PM    FINDINGS: Portable AP views of the chest obtained. Endotracheal tube  tip projects 5.3 cm from the chacorta. Right IJ central line tip  projects over the mid SVC. Enteric tube courses beyond the margins of  the film. The trachea is midline. The cardiac silhouette is within  normal limits. Increased hazy  opacification of the right hemithorax,  suggestive of underlying pleural fluid. Increased left pleural  effusion. Unchanged patchy bibasilar opacities.      Impression    IMPRESSION:   1. Endotracheal tube tip projects 5.3 cm from the chacorta.  2. Increased bilateral pleural effusions, right greater than left.  3. Unchanged bibasilar patchy opacities.    I have personally reviewed the examination and initial interpretation  and I agree with the findings.    ROB STARK MD   XR Chest Port 1 View    Narrative    XR CHEST PORT 1 VW  7/5/2017 10:47 AM      HISTORY: follow up     COMPARISON: 7/4/2017    FINDINGS: ET tube tip projects over the distal trachea, approximately  3.8 cm from the chacorta. Right IJ approach intravenous catheter tip  projects over the mid SVC. Enteric tube passes inferior left  hemidiaphragm, tip out of the field of view. Surgical clips project  over the right upper quadrant/right lung base. No pneumothorax.  Bilateral pleural effusions. Bibasilar airspace opacities. Cardiac  silhouette is not enlarged.      Impression    IMPRESSION:   1. Increasingly prominent bilateral pleural effusions.  2. Diffuse patchy airspace opacities, which may represent pulmonary  edema or pneumonia.    I have personally reviewed the examination and initial interpretation  and I agree with the findings.    DAMIEN LIRA MD   US Liver Transplant Portable    Narrative    EXAMINATION: US LIVER TRANSPLANT PORTABLE, 7/5/2017 11:13 AM     COMPARISON: Liver transplant ultrasound 3/5/2017, CT abdomen pelvis  7/4/2017    HISTORY: Post liver transplantation with high lactate. Exploratory  laparotomy 7/5/2017 with post op diagnosis of typhlitis    TECHNIQUE:  Gray-scale, color Doppler and spectral flow analysis.    FINDINGS:   There is trace ascites.    Liver:   The liver demonstrates normal homogeneous echotexture. No  evidence of a focal hepatic mass.     Bile Ducts: Both the intra- and extrahepatic biliary system are  of  normal caliber.  The common bile duct measures 3 mm in diameter.    Gallbladder: The gallbladder is surgically absent.    Kidneys:   Right kidney:  The right kidney demonstrates normal echotexture with  no evidence of a shadowing stone, focal mass or hydronephrosis.   13.0  cm in long axis dimension.  Left kidney:  The left kidney demonstrates normal echotexture with no  evidence of a shadowing stone, focal mass or hydronephrosis.   12.6 cm  in long axis dimension.    Pancreas: The pancreas is obscured by overlying bowel gas.    Spleen:  The spleen is enlarged with perisplenic varices, measuring  19.4 cm. In the sagittal dimension    Visualized portions of the aorta are unremarkable.    LIVER DOPPLER:  Splenic vein:  Patent continuous normal antegrade direction flow  towards the liver, 24 cm/sec.  Extrahepatic portal vein:  Patent continuous antegrade flow, 53  cm/sec.  Portal vein at anastomosis: Patent continuous antegrade flow, 71  cm/sec.  Intrahepatic portal vein:  Patent continuous antegrade flow, 93  cm/sec.  Right portal vein flow is antegrade, measuring 38 cm/sec.  Left portal vein flow is antegrade, measuring 25 cm/sec.    Inferior vena cava: patent with flow toward the heart throughout..  IVC above anastomosis:  104 cm/sec.  IVC at anastomosis:  80 cm/sec.  Intrahepatic IVC:  57 cm/sec.  Extrahepatic IVC:  39 cm/sec.    Right, mid, left hepatic veins: Patent with flow towards the inferior  vena cava.    Extrahepatic hepatic artery: Improved appearance of the extrahepatic  waveforms, now with low-resistance appearance with flow towards the  liver. 109 cm/sec with resistive index 0.76, previously 1.0 on  immediate postoperative liver transplant ultrasound 3/5/2017  Right hepatic artery: 84 cm/sec with resistive index 0.72.  Left hepatic artery: 42 cm/sec with resistive index 0.74.      Impression    Impression:   1.  Unremarkable appearance of the transplant liver.  2.  Normal Doppler evaluation of the  post transplant liver. Interval  improvement in waveform and resistive indices of the extrahepatic  artery compared to the immediate postoperative ultrasound on 3/5/2017.    I have personally reviewed the examination and initial interpretation  and I agree with the findings.    DALLIN RENAE MD   XR Chest Port 1 View    Narrative    Exam: XR CHEST PORT 1 VW, 7/6/2017 9:26 AM    Indication: follow up     Comparison: 7/5/2017.    Findings:   Single AP view the chest. ET tube tip approximately 6.1 cm from the  chacorta, right IJ central venous catheter with tip at the SVC, and  enteric tube with tip outside the field-of-view. Surgical clips over  the right upper quadrant. Stable low lung volumes. Cardiac silhouette  is unchanged in largely obscured. Pulmonary vascular is indistinct.  Increased layering right-sided pleural effusion and stable small  left-sided pleural effusion. Increase in diffuse airspace opacities.  No pneumothorax.      Impression    Impression:   1. Low lung volumes with increasing diffuse airspace opacities, right  lung predominant. Worsening pulmonary edema with underlying infection  versus atelectasis.  2. Increased small to moderate layering right-sided pleural effusion  and small left-sided pleural effusion.    I have personally reviewed the examination and initial interpretation  and I agree with the findings.    DAMIEN LIRA MD   XR Chest Port 1 View    Narrative    Exam: XR CHEST PORT 1 VW  7/7/2017 1:39 AM      History: intubated    Comparison: Radiograph 7/6/2017    Findings: Stable positioning of endotracheal tube, enteric tube, and  right-sided PICC. Cardiac silhouette is obscured and unchanged.  Trachea is midline. Unchanged bilateral pleural effusions. No  pneumothorax. Bibasilar opacities. Visualized abdomen is normal.      Impression    Impression: Unchanged bilateral pleural effusions with associated  bibasilar atelectasis versus consolidation.    I have personally reviewed the  examination and initial interpretation  and I agree with the findings.    BASILIO SAGASTUME MD   XR Chest Port 1 View    Narrative    Exam: Chest x-ray  7/8/2017 4:42 AM      History: Intubated    Comparison: Radiograph 7/7/2017    Findings: Stable positioning of endotracheal tube, enteric tube, and  right-sided internal jugular catheter. Cardiac silhouette is within  normal limits. Trachea is midline. Worsened layering pleural effusion  within the right hemithorax and moderate left pleural effusion with  associated left basilar opacities. No pneumothorax. Visualized abdomen  is normal.      Impression    Impression:   1. Worsened layering right pleural effusion with associated right  pulmonary opacities.  2. Stable left pleural effusion with associated left basilar  atelectasis versus consolidation.    I have personally reviewed the examination and initial interpretation  and I agree with the findings.    ROB STARK MD   CT Chest w/o Contrast    Narrative    EXAMINATION: Chest CT  7/8/2017     CLINICAL HISTORY: Pleural effusions, pulmonary infiltrates.h/o  cirrhosis s/p OLT 3/2017 who is admitted with neutropenia and colitis,  s/p exlap    COMPARISON: CT abdomen pelvis dated 7/4/2017, chest radiograph dated  7/8/2017.    TECHNIQUE: CT imaging obtained through the chest without intravenous  contrast. Coronal and axial MIP reformatted images obtained.    FINDINGS:  Endotracheal tube is present.    Heart size is enlarged. No significant pericardial effusion. Prominent  pulmonary artery measures up to 4.1 cm. No thoracic lymphadenopathy.  Perihilar groundglass attenuation suspicious of pulmonary edema. Large  pleural effusion on right and small pleural effusion on left overlying  on the bilateral lower lobe atelectasis.     Bones and soft tissues: Degenerative changes of the thoracic and  lumbar spine. Wedge shaped deformity of T8. No suspicious bone  findings.     Partially imaged upper abdomen: Limited.  Multiple surgical clips and  enteric tube present.      Impression    IMPRESSION: Large pleural effusion on right and small pleural effusion  on left overlying bilateral lower lobe atelectasis. Bibasilar  infection or aspiration cannot excluded.  1.  Cardiomegaly. Prominent pulmonary artery measures up to 4.1 cm.  2.  Perihilar groundglass attenuation suspicious of pulmonary edema.  Anasarca.    I have personally reviewed the examination and initial interpretation  and I agree with the findings.    BLANCA HUYNH MD   XR Chest Port 1 View    Narrative    Examination: XR CHEST PORT 1 VW, 7/9/2017 2:58 AM    Comparison: Chest radiograph and CT 7/8/2017    History: intubated    Findings: Endotracheal tube tip at the level of the mid thoracic  trachea. Right IJ central venous catheter tip at the level of the low  SVC. Enteric tube extends towards the left upper quadrant, tip not  included in the field-of-view. Cardiac silhouette is obscured. Large  right-sided pleural effusion, with hazy opacification of the right  hemithorax at least partially related to layering effusion. Left  basilar opacities are grossly unchanged. The left costophrenic angle  is not included in the field-of-view. No pneumothorax.      Impression    Impression: Large right-sided pleural effusion stable. Bilateral  opacities are grossly similar accounting for differences in patient  positioning, could represent edema or infection.    I have personally reviewed the examination and initial interpretation  and I agree with the findings.    DALLIN RENAE MD   XR Chest Port 1 View    Narrative    Exam: Chest x-ray  7/9/2017 5:13 PM      History: Right pleural drain    Comparison: Radiograph same date    Findings: Stable positioning of endotracheal tube, right-sided central  catheter, and enteric tube. New right-sided chest tube. Significantly  decreased right-sided pleural effusion and right pulmonary opacities.  Unchanged mild left basilar  opacities. No pneumothorax. Cardiac  silhouette is within normal limits. Trachea is midline. Surgical clips  project over the upper abdomen.      Impression    Impression:   1. Significantly decreased right pleural effusion and improved right  pulmonary opacities following placement of right-sided chest tube.  2. Unchanged mild left basilar opacities.    I have personally reviewed the examination and initial interpretation  and I agree with the findings.    BLANCA HUYNH MD   XR Chest Port 1 View    Narrative    EXAM: XR CHEST PORT 1 VW  7/10/2017 1:40 AM     HISTORY:  intubated       COMPARISON: 7/9/2017    FINDINGS: ET tube tip over mid thoracic trachea. Right IJ central line  tip terminates over the mid SVC. Right basilar chest tube is in place  and unchanged. Gastric tube tip projects below the diaphragm with  limited visualization of the tip and sidehole.    Stable cardiac silhouette. Stable right pleural effusion. Persistent  perihilar and bibasilar opacities with dense retrocardiac opacity. Low  lung volumes. No pneumothorax.       Impression    IMPRESSION: Stable perihilar/bibasilar opacities and right pleural  effusion.    I have personally reviewed the examination and initial interpretation  and I agree with the findings.    DIAZ NORMAN MD   XR Abdomen Port 1 View    Narrative    XR ABDOMEN PORT F1 VW 7/10/2017 3:28 PM    History: Verify small bowel feeding tube bedside placement.    Comparison: Abdominal x-ray on 7/8/2017.    Findings: Single view of the abdomen was obtained. Interval placement  of feeding tube with the distal tip projects over the proximal  jejunum. Enteric tube projects over the stomach. Prominent small bowel  gas pattern. Partial visualization of surgical clips and drain.      Impression    Impression: Feeding tube distal tip projects over proximal jejunum.   There is a mildly prominent loop of small bowel, limited view.  If  concerned for possible obstruction or ileus,  recommend additional  complete abdominal radiographs.  Additionally, recommend short term  follow up radiograph.     I have personally reviewed the examination and initial interpretation  and I agree with the findings.    DALLIN RENAE MD   XR Chest Port 1 View    Narrative    EXAM: XR CHEST PORT 1 VW  7/11/2017 1:45 AM     HISTORY:  intubated       COMPARISON: 7/10/2017    FINDINGS: Stable interval tube, right central line, enteric tubes and  right basilar chest tube.     Unchanged myocardial likely. Stable perihilar and bibasilar pulmonary  opacities. Small right greater than left pleural effusions. No  pneumothorax.      Impression    IMPRESSION: Stable perihilar and bibasilar opacities and right greater  than left pleural effusion. Findings may represent edema or infection.    I have personally reviewed the examination and initial interpretation  and I agree with the findings.    DALLIN RENAE MD   XR Chest Port 1 View    Narrative    Examination: XR CHEST PORT 1 VW, 7/11/2017 5:02 PM    Comparison: 7/11/2017 at 1:27 AM    History: chest exam 6 hours post water seal    Findings: Endotracheal tube tip at the level of the mid thoracic  trachea. Enteric tubes extend into the left upper quadrant, tips not  included in the field-of-view. Right IJ central venous catheter tip at  the level of the mid SVC. Right basilar chest tube is stable in  position. The cardiomediastinal silhouette is stable, partially  obscured. Bilateral pleural effusions and associated bibasilar  opacities are not significantly changed. No appreciable pneumothorax.      Impression    Impression:   1. No appreciable pneumothorax.  2. Bilateral pleural effusions and associated perihilar/bibasilar  opacities are not significantly changed.    I have personally reviewed the examination and initial interpretation  and I agree with the findings.    DIAZ NORMAN MD    Upper Extremity Arterial Duplex Right     Value    Radiologist flags  Occlusion of the distal radial artery (Urgent)    Narrative    Duplex Doppler ultrasound assessment of the upper extremities arteries  bilaterally 7/11/2017 1:15 PM    Clinical information: Assess for arterial occlusive disease    Ordering provider: Dr. Malone    Technique: B-mode (grayscale) and duplex Doppler ultrasound of the  upper extremity arteries. Velocity measurements obtained with angle  correction at or less than 60 degrees.    FINDINGS:     Peak systolic velocities:    Right Upper Extremity Arteries:     Subclavian: 157 cm/sec  Axillary artery: 119 cm/sec    Brachial proximal: 164 cm/sec   Brachial mid: 187 cm/sec  Brachial antecubital: 157 cm/sec    Radial artery origin: 102 cm/sec  Radial artery mid: 47 cm/sec  Radial artery distal (proximal to branch): 34 cm/sec  Radial artery distal (distal to branch): Occluded  Radial artery branch and distal forearm: 86 cm/s    Ulnar artery proximal: 146 cm/sec    Waveforms are triphasic throughout.      Impression    IMPRESSION:  The radial artery is occluded from the distal forearm to the wrist,  and terminates in distal forearm branches. All other examined arteries  of the right upper extremity are patent without stenosis, including  the ulnar artery.      [Urgent Result: Occlusion of the distal radial artery]    Finding was identified on 7/11/2017 1:18 PM.     Nitish Malone was contacted by Dr. Saeed at 7/11/2017 1:28 PM and  verbalized understanding of the urgent finding.       I have personally reviewed the examination and initial interpretation  and I agree with the findings.    RENEE DOBBS MD   XR Chest Port 1 View    Narrative    Examination: XR CHEST PORT 1 VW, 7/12/2017 2:06 AM    Comparison: 7/11/2017 at 16:57 PM.    History: intubated    Findings: Endotracheal tube tip in the mid thoracic trachea. Unchanged  partially visualized enteric tubes. Right IJ central line tip over  distal SVC. Unchanged right basilar chest tube.    Stable cardiac silhouette.  Persistent perihilar and bibasilar streaky  opacities. Stable pleural effusions and associated basilar opacities.  No appreciable pneumothorax.      Impression    Impression:   1.  Bilateral pleural effusions and associated perihilar/bibasilar  opacities are not significantly changed.  2.  Stable support devices.    I have personally reviewed the examination and initial interpretation  and I agree with the findings.    DIAZ NORMAN MD   XR Chest Port 1 View    Narrative    Exam: Chest x-ray  7/12/2017 8:52 PM      History: CT removal    Comparison: Radiograph same day    Findings: Enteric tube courses below the diaphragm. Stable positioning  of right-sided central catheter and endotracheal tube. Right-sided  chest tube has been removed. Cardiac silhouette is largely obscured.  Trachea is midline. Unchanged bilateral effusions with associated  bilateral pulmonary opacities. No pneumothorax. Visualized abdomen is  normal.      Impression    Impression:   1. No right-sided pneumothorax following removal right-sided chest  tube.  2. Stable bilateral pleural effusions with associated bilateral  atelectasis versus consolidation.    I have personally reviewed the examination and initial interpretation  and I agree with the findings.    BLANCA HUYNH MD   CT Chest Abdomen Pelvis w/o Contrast    Narrative    EXAMINATION: CT CHEST ABDOMEN PELVIS W/O CONTRAST, 7/13/2017 2:36 PM    TECHNIQUE:  Helical CT images from the thoracic inlet through the  symphysis pubis were obtained  without IV contrast.     COMPARISON: CT chest 7/8/2017, outside CT abdomen 7/4/2017    HISTORY: assess infectious process.    FINDINGS:    Chest: Right IJ central venous catheter tip within the high SVC.  Endotracheal tube tip within the mid thoracic trachea. Gastric tube  tip within the stomach. Feeding tube tip within the proximal jejunum.  Stable borderline enlargement of the heart. The aorta and pulmonary  artery are normal in caliber.  Coronary artery calcifications. The  visualized thyroid is within normal limits. No mediastinal, hilar or  axillary lymphadenopathy. Small to moderate right right-sided pleural  effusion, improved from 7/8/2017. The left-sided pleural effusion is  essentially completely resolved with only trace residual fluid. There  is persistent consolidation/atelectasis of the left lower lobe,  despite near resolution of left-sided pleural effusion. There is near  complete atelectasis of the right lower lobe, with improvement of  right upper and middle lobe atelectasis. The aerated parts of the  lungs are clear.    Abdomen and pelvis: Postoperative changes of liver transplant.  Cholecystectomy. Normal unenhanced appearance of the liver.  Cholecystectomy. The pancreas is atrophic. The spleen is enlarged  measuring up to 20.2 cm in craniocaudal dimension, which appears  slightly decreased from 7/4/2017 when it measured 21.2 cm in  craniocaudal dimension. Normal unenhanced appearance of the adrenals  and kidneys. The bladder is decompressed with Crowe catheter in place.  Prostate is within normal limits. Rectal tube is present. Colonic  diverticulosis. No dilated loops of small bowel. Aorta is normal in  caliber. Infrarenal IVC filter. Moderate-large amount of ascites  increased from 7/8/2017. Mildly prominent retroperitoneal lymph nodes  are not significantly changed.     Bones and soft tissues: Moderate soft tissue edema and dependent skin  thickening. New ventral incision with skin staples present. 4.8 x 1.8  cm ventral abdominal wall fluid collection is not significantly  changed, and is located adjacent to a prominent vein coursing through  the ventral abdominal wall. No acute fracture or suspicious bone  lesion.      Impression    IMPRESSION:   1. Improved moderate right-sided pleural effusion and resolution of  left-sided pleural effusion. There remain large areas of  atelectasis/consolidation in both lungs, including in the  left lower  lobe despite resolution of left-sided pleural effusion. Superimposed  infectious process cannot be excluded.  2. Moderate-large amount of ascites increased from 7/8/2017, which  makes it difficult to evaluate for the presence or absence of  inflammatory change or infectious process in the abdomen or pelvis. No  intra-abdominal abscess.  3. Stable small presumed hematoma within the ventral abdominal wall  fat.  4. Splenomegaly.    I have personally reviewed the examination and initial interpretation  and I agree with the findings.    BASILIO SAGASTUME MD   XR Chest Port 1 View    Narrative    Exam: XR CHEST PORT 1 VW, 7/14/2017 5:27 PM    Indication: resp failure    Comparison: CT 7/13/2017 and x-ray dated 7/12/ 2017    Findings:   Endotracheal tube projects 5 cm above the chacorta. Enteric tubes have  infradiaphragmatic course, projecting beyond the field of view.  Cardiac silhouette is largely obscured. Trachea is midline. No  pneumothorax. Visualized upper abdomen is unremarkable. Bibasilar and  perihilar opacities, unchanged compared with previous radiograph with  unchanged bilateral pleural effusion.      Impression    Impression:  Unchanged bilateral pleural effusion with associated bibasilar  atelectasis versus consolidation. Perihilar edema or atelectasis is  also unchanged.    I have personally reviewed the examination and initial interpretation  and I agree with the findings.    BASILIO SAGASTUME MD   XR Abdomen Port 1 View    Narrative    Abdomen one view    HISTORY: NG tube    COMPARISON STUDY: 7/13/2017    FINDINGS: Enteric tube tip in stomach. Feeding tube tip in the  proximal jejunum. Bibasilar atelectasis and consolidation. Surgical  clips in the upper abdomen. IVC filter in place.      Impression    Impression: NG tube tip in the stomach and feeding tube in the  proximal jejunum.    DAMIEN LIRA MD   XR Chest Port 1 View    Narrative    EXAM: XR CHEST PORT 1 VW  7/18/2017 5:46 AM       HISTORY: Shortness breath.    COMPARISON: Radiograph 7/14/2017    FINDINGS: AP radiograph of the chest. Interval removal of the gastric  tube and endotracheal tube. Feeding tube passes below the diaphragm  and is collimated off the field-of-view. Surgical clips project over  the right upper quadrant. Cardiac silhouette is unchanged. Low lung  volumes. Worsening perihilar opacities. Streaky bibasilar opacities,  right greater than left. Small pleural effusions, unchanged.       Impression    IMPRESSION:   1. Worsening perihilar opacities, suggestive of pulmonary edema  although atelectasis or infection cannot be excluded.  2. Small pleural effusions with associated atelectasis/consolidation.   3. Interval extubation.    I have personally reviewed the examination and initial interpretation  and I agree with the findings.    LUCI HOROWITZ MD   MR Brain w/o & w Contrast    Narrative    Brain MRI without and with contrast    History: Encephalopathy. Immunosuppressed patient on tacrolimus,  recent neutropenic colitis with sepsis requiring pressors. Concern for  ischemia, infection, pres.    Comparison: Head CT from 2/21/2017, brain MRI from 7/3/2012.    Technique: Axial FLAIR,  T1-weighted, and susceptibility images were  obtained without intravenous contrast. Following intravenous  gadolinium-based contrast administration, axial T2-weighted,  diffusion, FLAIR, and axial and coronal T1-weighted images were  obtained.     Dose: 10mL Gadav[employer]    Findings:   Images are significantly degraded due to motion artifact.     There is no mass, significant mass effect, midline shift, or evidence  of intracranial hemorrhage. The ventricles are not enlarged out of  proportion to the cerebral sulci. The gray-white matter  differentiation of the cerebral hemispheres is preserved. Postcontrast  images demonstrate no abnormal intracranial parenchymal or meningeal  enhancement. Diffusion-weighted images demonstrate no areas  of  abnormal restricted diffusion.    Mucosal thickening in the left sphenoid, and left greater than right  maxillary sinuses. Left-sided nasoenteric tube is noted. Fluid in the  mastoid air cells and the left.      Impression    Impression:  1. Significant image degradation due to motion artifact, with no  definite acute intracranial pathology. No abnormal contrast enhancing  intracranial lesions.  2. Mucosal thickening in the sphenoid and maxillary sinuses and left  greater than right mastoid fluid are nonspecific in the setting of  recent intubation.    I have personally reviewed the examination and initial interpretation  and I agree with the findings.    CASSIDY GRANT MD   US Lower Extremity Arterial Duplex Bilateral    Narrative    Exam: Duplex ultrasound of the bilateral lower extremity arteries and  bilateral ABIs dated 7/21/2017 11:47 AM     Clinical information: Concern for distal limb ischemia. Patient with  complex medical history including end-stage liver disease status post  renal transplant, sepsis, colitis, type 2 diabetes. Patient noted to  have black discoloration at the tip of the right great toe as well as  black discoloration on a finger of the right hand.    Comparison: None available    Technique: Includes Gray Scale images, color Doppler, spectral Doppler  waveforms and velocities with appropriate angles of 60 degrees or  less.    Ordering provider: Dr. Hector    FINDINGS:     Right lower extremity:   Common femoral artery: 145 cm/sec. Waveforms: Triphasic  Deep femoral artery: 114 cm/sec. Waveforms: Triphasic  Proximal SFA: 126 cm/sec. Waveforms: Triphasic  Mid SFA: 131 cm/sec. Waveforms: Triphasic  Distal SFA: 133 cm/sec. Waveforms: Triphasic  Popliteal artery: 69 cm/sec. Waveforms: Triphasic  PTA ankle: 122 cm/sec. Waveforms: Triphasic  RIK ankle: 144 cm/sec. Waveforms: Triphasic    Right:    Arm: 139 mmHg   PT at ankle: 176 mmHg   DP at foot: 189 mmHg   First digit: 93 mmHg   RONAL:  1.24   TBI: 0.61    Right pulse volume recordings or VPR:   First digit: Normal      Left lower extremity:  There is a high common femoral artery bifurcation.  Deep femoral artery: 142 and 132 cm/sec. Waveforms: Triphasic  Proximal SFA: 133 cm/sec. Waveforms: Triphasic  SFA at level of groin: 145 cm/sec. Waveforms: Triphasic  Mid SFA: 135 cm/second. Waveforms triphasic.  Distal SFA: 125 cm/sec. Waveforms: Triphasic  Popliteal artery: 111 cm/sec. Waveforms: Triphasic  PTA ankle: 150 cm/sec. Waveforms: Triphasic  RIK ankle: 103 cm/sec. Waveforms: Triphasic      Left:   Arm: 152 mmHg   PT at ankle: 186 mmHg   DP at foot: 197 mmHg   First digit: 74 mmHg   RONAL: 1.30   TBI: 0.49    Left pulse volume recordings or VPR:   First digit: Normal          Impression    IMPRESSION:   1. Right leg: Normal RONAL. Mildly Abnormal TBI, suggestive of small  vessel disease. Patent right lower extremity arteries without evidence  of hemodynamically significant stenosis.  2. Left leg: Normal RONAL. Abnormal TBI, suggestive of small vessel  disease. Patent left lower extremity arteries without evidence of  hemodynamically significant stenosis.      Guidelines:  [facility] duplex criteria for lower limb arterial  occlusive disease  -Percent stenosis- Normal (1-19%): Peak systolic velocity (cm/s):  <150, End-diastolic velocity (cm/s): <40, Velocity ration (Vr): <1.5,  Distal arterial waveform: Triphasic  -Percent stenosis- 20-49%: Peak systolic velocity (cm/s): 150-200,  End-diastolic velocity (cm/s): <40, Velocity ration (Vr): 1.5-2.0,  Distal arterial waveform: Triphasic  -Percent stenosis- 50-75%: Peak systolic velocity (cm/s): 200-300,  End-diastolic velocity (cm/s): <90, Velocity ration (Vr): 2.0-3.9,  Distal arterial waveform: Poststenotic turbulence distal to stenosis,  monophasic distal waveform  -Percent stenosis- >75%: Peak systolic velocity (cm/s): >300,  End-diastolic velocity (cm/s): <90, Velocity ration (Vr):  >4.0, Distal  arterial waveform: Dampened distal waveform and low PSV/EDV* in the  stenosis  -Percent stenosis- Occlusion: Absent flow by color Doppler/pulsed  Doppler spectral analysis; length of occlusion estimated from distance  between exit and reentry collateral arteries  *PSV = peak systolic velocity, EDV = end-diastolic velocity  http://link.guerrier.com/chapter/10.1007/[email or web address]_23/fulltext  html      RONAL Diagnostic Criteria (Based on criteria published in Circulation  2011; 124: 0456-6628):    > 1.4: Non compressible    1.00 - 1.40: Normal    0.91 - 0.99: Borderline    At or below 0.90: Abnormal  RONAL Diagnostic Criteria (Based on ACC/AHA guideline 2008):    >/=1.3 - non compressible vessels    1.00  -1.29 - Normal    0.91 - 0.99 - Borderline    0.41 - 0.90 - Mild to moderate PAD    0.00 - 0.40 - Severe PAD    I have personally reviewed the examination and initial interpretation  and I agree with the findings.    ESTRELLA AVILES MD   US RONAL Doppler No Exercise    Narrative    Exam: Duplex ultrasound of the bilateral lower extremity arteries and  bilateral ABIs dated 7/21/2017 11:47 AM     Clinical information: Concern for distal limb ischemia. Patient with  complex medical history including end-stage liver disease status post  renal transplant, sepsis, colitis, type 2 diabetes. Patient noted to  have black discoloration at the tip of the right great toe as well as  black discoloration on a finger of the right hand.    Comparison: None available    Technique: Includes Gray Scale images, color Doppler, spectral Doppler  waveforms and velocities with appropriate angles of 60 degrees or  less.    Ordering provider: Dr. Hector    FINDINGS:     Right lower extremity:   Common femoral artery: 145 cm/sec. Waveforms: Triphasic  Deep femoral artery: 114 cm/sec. Waveforms: Triphasic  Proximal SFA: 126 cm/sec. Waveforms: Triphasic  Mid SFA: 131 cm/sec. Waveforms: Triphasic  Distal SFA: 133 cm/sec. Waveforms:  Triphasic  Popliteal artery: 69 cm/sec. Waveforms: Triphasic  PTA ankle: 122 cm/sec. Waveforms: Triphasic  RIK ankle: 144 cm/sec. Waveforms: Triphasic    Right:    Arm: 139 mmHg   PT at ankle: 176 mmHg   DP at foot: 189 mmHg   First digit: 93 mmHg   RONAL: 1.24   TBI: 0.61    Right pulse volume recordings or VPR:   First digit: Normal      Left lower extremity:  There is a high common femoral artery bifurcation.  Deep femoral artery: 142 and 132 cm/sec. Waveforms: Triphasic  Proximal SFA: 133 cm/sec. Waveforms: Triphasic  SFA at level of groin: 145 cm/sec. Waveforms: Triphasic  Mid SFA: 135 cm/second. Waveforms triphasic.  Distal SFA: 125 cm/sec. Waveforms: Triphasic  Popliteal artery: 111 cm/sec. Waveforms: Triphasic  PTA ankle: 150 cm/sec. Waveforms: Triphasic  RIK ankle: 103 cm/sec. Waveforms: Triphasic      Left:   Arm: 152 mmHg   PT at ankle: 186 mmHg   DP at foot: 197 mmHg   First digit: 74 mmHg   RONAL: 1.30   TBI: 0.49    Left pulse volume recordings or VPR:   First digit: Normal          Impression    IMPRESSION:   1. Right leg: Normal RONAL. Mildly Abnormal TBI, suggestive of small  vessel disease. Patent right lower extremity arteries without evidence  of hemodynamically significant stenosis.  2. Left leg: Normal RONAL. Abnormal TBI, suggestive of small vessel  disease. Patent left lower extremity arteries without evidence of  hemodynamically significant stenosis.      Guidelines:  [facility] duplex criteria for lower limb arterial  occlusive disease  -Percent stenosis- Normal (1-19%): Peak systolic velocity (cm/s):  <150, End-diastolic velocity (cm/s): <40, Velocity ration (Vr): <1.5,  Distal arterial waveform: Triphasic  -Percent stenosis- 20-49%: Peak systolic velocity (cm/s): 150-200,  End-diastolic velocity (cm/s): <40, Velocity ration (Vr): 1.5-2.0,  Distal arterial waveform: Triphasic  -Percent stenosis- 50-75%: Peak systolic velocity (cm/s): 200-300,  End-diastolic velocity (cm/s): <90,  Velocity ration (Vr): 2.0-3.9,  Distal arterial waveform: Poststenotic turbulence distal to stenosis,  monophasic distal waveform  -Percent stenosis- >75%: Peak systolic velocity (cm/s): >300,  End-diastolic velocity (cm/s): <90, Velocity ration (Vr): >4.0, Distal  arterial waveform: Dampened distal waveform and low PSV/EDV* in the  stenosis  -Percent stenosis- Occlusion: Absent flow by color Doppler/pulsed  Doppler spectral analysis; length of occlusion estimated from distance  between exit and reentry collateral arteries  *PSV = peak systolic velocity, EDV = end-diastolic velocity  http://link.guerrier.com/chapter/10.1007/146-6-7794-4005-4_23/fulltext  html      RONAL Diagnostic Criteria (Based on criteria published in Circulation  2011; 124: 4525-2658):    > 1.4: Non compressible    1.00 - 1.40: Normal    0.91 - 0.99: Borderline    At or below 0.90: Abnormal  RONAL Diagnostic Criteria (Based on ACC/AHA guideline 2008):    >/=1.3 - non compressible vessels    1.00  -1.29 - Normal    0.91 - 0.99 - Borderline    0.41 - 0.90 - Mild to moderate PAD    0.00 - 0.40 - Severe PAD    I have personally reviewed the examination and initial interpretation  and I agree with the findings.    ESTRELLA AVILES MD   XR Abdomen Port 1 View    Narrative    EXAM: XR ABDOMEN PORT F1 VW  7/25/2017 11:02 AM      HISTORY: abdominal distension    COMPARISON: 7/16/2017    FINDINGS: Limited view, which excludes the right upper quadrant of the  abdomen. Feeding tube tip over proximal jejunum. Diffuse abdominal  haziness with bulging of flanks, consistent with ascites. Upper  abdominal surgical staples and abdominal wall staples.    Mildly dilated transverse colon, measuring up to 8 cm previously 7.5  cm. Mildly dilated bowel loops in the low pelvis. Slightly prominent  small bowel loops in the right lower quadrant measuring up to 3.8 cm.  Rectal gas is noted. Small amount of right colonic stool burden.      Impression    IMPRESSION: Mildly  increased distention of the transverse colon.  Mildly dilated small bowel loops in the right lower quadrant. Rectal  gas is noted. Findings likely secondary to adynamic ileus, less likely  consideration includes obstruction.      I have personally reviewed the examination and initial interpretation  and I agree with the findings.    DALLIN RENAE MD   XR Chest Port 1 View    Narrative    EXAM: XR CHEST PORT 1 VW  7/25/2017 11:03 AM      HISTORY: hypoxia    COMPARISON: Chest radiograph 7/18/2017 and 7/14/2017, CT the 7/30/2017    FINDINGS:     Single AP view of the chest. Enteric tube with the tip collimated.  Surgical clips project over the right upper quadrant.    Increased large cardiomediastinal silhouette. Increased pulmonary  edema.    Increased bilateral moderate pleural effusion with overlying  atelectasis/consolidation. No pneumothorax.       Impression    IMPRESSION:   1. Increased cardiac silhouette. Increased pulmonary edema.  2. Increased bilateral moderate pleural effusions with overlying  atelectasis/consolidation.     I have personally reviewed the examination and initial interpretation  and I agree with the findings.    DAMIEN LIRA MD   US Abdomen Limited    Narrative    EXAMINATION: US ABDOMEN LIMITED, 7/25/2017 10:56 AM     COMPARISON: Ultrasound 7/5/2017    HISTORY: eval for ascites    TECHNIQUE: Gray scale ultrasound of the abdomen.    FINDINGS:  A targeted ultrasound of all 4 abdominal quadrants was performed and  demonstrated moderate ascites with septations.    Incidental right pleural effusion.       Impression    IMPRESSION:  Moderate ascites with septations.    I have personally reviewed the examination and initial interpretation  and I agree with the findings.    DIAZ NORMAN MD   CT Chest Abdomen Pelvis w/o Contrast     Value    Radiologist flags Retroperitoneal gas (Urgent)    Narrative    EXAMINATION: CT CHEST ABDOMEN PELVIS W/O CONTRAST, 7/25/2017 5:32 PM    TECHNIQUE:   Helical CT images from the thoracic inlet through the  symphysis pubis were obtained without contrast.     COMPARISON: CT 7/13/2017    HISTORY: evaluate for infection    FINDINGS:    Chest: Endotracheal tube tip 4.8 cm from the chacorta. Gastric tube tip  in the gastric fundus. Feeding tube tip near the ligament of Treitz.    Partially visualized thyroid is normal. Scattered prominent  mediastinal lymph nodes, for example a 12 mm right paratracheal lymph  node (axial series 5, image 23). Heart is mildly enlarged. Hypodense  blood pool, compatible with clinical history of anemia. No pericardial  effusion.    Small bilateral pleural effusions. No pneumothorax. Bibasilar  atelectasis versus consolidation.    Abdomen and pelvis: Postsurgical changes of liver transplant. No focal  liver lesion. The spleen is enlarged measuring 22 cm in the  midclavicular line. The adrenal glands, pancreas, kidneys, and small  bowel have a normal appearance on this nonenhanced study. Scattered  colonic diverticula. Infrarenal IVC filter.    In the right lower quadrant, there is a new 14.9 x 6.1 x 11.5 cm  ill-defined area of retroperitoneal gas which extends superiorly to  abut the second/third portions of the duodenum. No definite bowel  perforation is seen.    Large volume ascites with extensive mesenteric and soft tissue edema.  Innumerable mesenteric and retroperitoneal lymph nodes which are  likely reactive. Small hematoma in the ventral abdominal subcutaneous  fat (for example axial series 5, image 212).    Small amount of air in the bladder is likely related to Crowe  catheter.    Bones: No suspicious bony abnormality.      Impression    IMPRESSION:   1. New large heterogeneous area of right-sided retroperitoneal gas  which is highly concerning for an infectious process. Given the  history of prior neutropenic colitis and biopsies, there could also be  a component of stool from a ruptured viscus, despite the lack of  surrounding  bowel loops and no extraluminal oral contrast. Surgical  consultation is recommended.     2. Bibasilar atelectasis versus consolidation with small bilateral  pleural effusions.    3. Extensive mesenteric and soft tissue edema with large volume  ascites. Numerous mesenteric and retroperitoneal lymph nodes which are  presumably reactive.    4. Postsurgical changes of liver transplant.    [Urgent Result: Retroperitoneal gas]    Finding was identified on 7/25/2017 5:34 PM.     Dr. Santoyo was contacted by Dr. Atkins at 7/25/2017 5:37 PM and  verbalized understanding of the urgent finding.     I have personally reviewed the examination and initial interpretation  and I agree with the findings.    LUCI HOROWITZ MD   XR Chest Port 1 View    Narrative    EXAM: XR CHEST PORT 1 VW  7/25/2017 1:30 PM      HISTORY: endotracheal tube positioning    COMPARISON: Chest radiograph the earlier today, 7/18/2017, CT  7/13/2017    FINDINGS:     Single AP view of the chest.     Endotracheal tube tip projects approximately 4.8 cm above the chacorta.    Stable large cardiomediastinal silhouette. Persistent pulmonary edema    Persistent moderate bilateral pleural effusion with overlying  atelectasis/consolidation. No pneumothorax.       Impression    IMPRESSION:   New endotracheal tube tip projects approximately 4.8 cm above the  chacorta. Persistent pulmonary edema. Persistent left lung base  atelectasis/consolidation.    I have personally reviewed the examination and initial interpretation  and I agree with the findings.    DAMIEN LIRA MD   US Liver Transplant    Narrative    EXAMINATION: US LIVER TRANSPLANT, 7/26/2017 12:25 AM     COMPARISON: CT cap 7/25/2017.    HISTORY: Sepsis, 4 month post liver transplant.    TECHNIQUE:  Gray-scale, color Doppler and spectral flow analysis.    FINDINGS:   Bilateral pericolic gutter ascites.    Liver:   The liver demonstrates normal homogeneous echotexture. No  evidence of a focal hepatic mass.      Bile Ducts: Both the intra- and extrahepatic biliary system are of  normal caliber.  The common bile duct measures 2.9 mm in diameter.    Gallbladder: The gallbladder is surgically absent.    Kidneys:   Right kidney:  The right kidney demonstrates normal echotexture with  no evidence of a shadowing stone, focal mass or hydronephrosis.   12.5  cm in long axis dimension.  Left kidney:  The left kidney demonstrates normal echotexture with no  evidence of a shadowing stone, focal mass or hydronephrosis.   13.2 cm  in long axis dimension.    Pancreas: Obscured    Spleen: Spleen measures 22.2 cm. Visualized portions of the aorta are  unremarkable.    LIVER DOPPLER:  Splenic vein: Not visualized  Extrahepatic portal vein:  Patent continuous antegrade flow, 45  cm/sec.  Portal vein at anastomosis: Patent continuous antegrade flow, 131  cm/sec.  Intrahepatic portal vein:  Patent continuous antegrade flow, 91  cm/sec.  Right portal vein flow is antegrade, measuring 55 cm/sec.  Left portal vein flow is antegrade, measuring 57 cm/sec.    Inferior vena cava: patent with flow toward the heart throughout..  IVC above anastomosis:  162 cm/sec.  IVC at anastomosis:  121 cm/sec.  Intrahepatic IVC:  75 cm/sec.  Extrahepatic IVC:  39 cm/sec.    Right, mid, left hepatic veins: Patent with flow towards the inferior  vena cava.    Extrahepatic hepatic artery: Low resistance waveform with flow towards  the liver. 99 cm/sec with resistive index 0.84.  Right hepatic artery: 115 cm/sec with resistive index 0.75.  Left hepatic artery: 71 cm/sec with resistive index 0.79.      Impression    Impression:   1.  Unremarkable Doppler assessment of liver transplant.  2.  Left and right paracolic gutters ascites.  3.  Splenomegaly.    I have personally reviewed the examination and initial interpretation  and I agree with the findings.    LUCI HOROWITZ MD   XR Chest Port 1 View    Narrative    XR CHEST PORT 1 VW 7/25/2017 6:56 PM    History:  confirm RIJ central line    Comparison: CT and radiograph 7/25/2017    Findings: Right IJ catheter tip projects over the superior cavoatrial  junction. Gastric tube sidehole projects over the stomach. Enteric  tube courses in the stomach with the tip below the field of view.  Endotracheal tube tip projects over the upper thoracic trachea.    Cardiomegaly with unchanged dense bibasilar opacities and small  bilateral pleural effusions. Unchanged perihilar and peripheral  interstitial opacities. No pneumothorax. Surgical clips in the upper  abdomen.      Impression    Impression:   1. Right IJ catheter tip projects over the superior cavoatrial  junction.  2. Mild cardiomegaly with small bilateral pleural effusions.  3. Bibasilar atelectasis versus consolidation.    I have personally reviewed the examination and initial interpretation  and I agree with the findings.    LUCI HOROWITZ MD   XR Abdomen Port 1 View    Narrative    History: Colonic perforation.    COMPARISON: Single view of the abdomen 7/25/2017.    FINDINGS: Feeding tube present and in good position. IVC filter,  paramidline skin staples and upper abdominal surgical clips again  noted. No gas-filled, dilated loops of large or small bowel. Oral  contrast material in the right colon and extending into the transverse  colon across midline. Stool burden appears to be moderate to large  with stool filling most of the cecum through the descending sigmoid  junction. No gross free intracranial air.      Impression    IMPRESSION: No evidence for bowel obstruction or gross free  intraperitoneal air. Moderate to large stool burden.    TESSA THOMAS MD   XR Chest Port 1 View    Narrative    EXAM: XR CHEST PORT 1 VW  7/27/2017 10:45 AM      HISTORY: interval change    COMPARISON: Chest radiograph 7/25/2017, 7/15/2017, CT 7/25/2017    FINDINGS:     Single AP view of the chest. Endotracheal tube projects approximately  6.3 cm above the chacorta. The endotracheal tube  tip presses against the  left tracheal wall. Right IJ central venous catheter projects in the  mid SVC. Dual enteric tubes.    Stable large cardiac silhouette. Stable perihilar pulmonary opacities.    Stable bilateral small pleural effusions with overlying  atelectasis/consolidation. No pneumothorax.     Surgical clips project over the right upper quadrant.      Impression    IMPRESSION:   1.  Endotracheal tube projects approximately 6.3 cm above the chacorta.  2.  Stable cardiomegaly. Stable bilateral small pleural effusions with  overlying atelectasis/consolidation.  3.  Stable perihilar pulmonary opacities, edema versus infection.    I have personally reviewed the examination and initial interpretation  and I agree with the findings.    DALLIN RENAE MD   XR Abdomen Port 1 View    Narrative    EXAM: XR ABDOMEN PORT F1 VW  7/27/2017 10:46 AM      HISTORY: assess Feeding tube location.    COMPARISON: 7/26/2017    FINDINGS: Feeding tube tip over the duodenojejunal flexure. Residual  contrast in the colon. IVC filter. Surgical clips and drainage  catheter in the right upper quadrant. Surgical staples lower left  abdomen. Right lower quadrant stoma, with the catheter. Adequately  positioned gastric tube. Paucity of small bowel gas without additional  findings to suggest obstruction.      Impression    IMPRESSION: Feeding tube tip over duodenojejunal flexure.    I have personally reviewed the examination and initial interpretation  and I agree with the findings.    DIAZ NORMAN MD   XR Abdomen Port 1 View    Narrative    EXAM: XR ABDOMEN PORT F1 VW  7/28/2017 9:42 AM      HISTORY: NJT positioning after extubation today    COMPARISON: 7/27/2017    FINDINGS: Feeding tube tip over duodenojejunal flexure. Bibasilar  airspace opacities, partially evaluated. IVC filter. Right upper  quadrant surgical clips and drain. Nonobstructive bowel gas pattern.       Impression    IMPRESSION:   1. Feeding tube tip over the  duodenojejunal flexure.  2. Bibasilar airspace opacities, partially evaluated.     I have personally reviewed the examination and initial interpretation  and I agree with the findings.    DIAZ NORMAN MD   XR Chest Port 1 View    Narrative    EXAM: XR CHEST PORT 1 VW  7/28/2017 11:29 AM      HISTORY: persistent fever. Evalute for infectious sources    COMPARISON: Chest radiograph 7/27/2017    FINDINGS:     Single AP view of the chest. Interval removal of the endotracheal  tube. Stable right IJ central venous catheter projects over the low  SVC. Enteric tube with the tip collimated. The second enteric tube has  been removed.    The cardiac silhouette is obscured. Increased perihilar opacities.     Increased bilateral moderate pleural effusions with overlying  atelectasis/consolidation. No pneumothorax.     Surgical clips project over the right upper quadrant.      Impression    IMPRESSION:   1.  Interval removal of the endotracheal tube.  2.  Increased perihilar opacities, worsening infection versus  pulmonary edema.   3.  Increased bilateral moderate pleural effusions with overlying  atelectasis/consolidation.     I have personally reviewed the examination and initial interpretation  and I agree with the findings.    DALLIN RENAE MD   XR Chest 2 Views    Narrative    Exam: XR CHEST 2 VW, 7/31/2017 11:42 AM    Indication: Fever    Comparison: 7/28/2017, 7/27/2017, 7/25/2017    Findings:   AP and lateral views of the chest were obtained. The right IJ catheter  tip projects over the mid SVC. The enteric tube tip projects over the  proximal small bowel in the lateral view. The remaining mediastinal  silhouette is grossly unchanged. Decreased lung volumes. No  pneumothorax. Small pleural effusions bilaterally. Indistinct  pulmonary vasculature. Improved perihilar and bibasilar opacities. The  upper abdomen is unremarkable.      Impression    Impression:   1. Improving perihilar and bibasilar opacities likely  represent  infection/aspiration, atelectasis, or pulmonary edema.  Moderate  opacities bilaterally persist.   2. Stable small to moderate pleural effusions bilaterally.    I have personally reviewed the examination and initial interpretation  and I agree with the findings.    DALLIN RENAE MD   XR Chest 2 Views    Narrative    Exam: XR CHEST 2 VW, 8/2/2017 10:39 AM    Indication: Dyspnea    Comparison: 7/31/2017, 7/20/2017, 7/27/2017    Findings:   PA and lateral views of the chest were obtained. The enteric tube  passes below the field-of-view. The right IJ catheter tip projects  over the mid SVC. The cardiomediastinal silhouette is unchanged.  Decreased lung volumes. No pneumothorax. Small right pleural effusion.  Increasing perihilar and bibasilar opacities. Surgical clips project  over the right upper quadrant.      Impression    Impression:   1. Decreased lung volumes with mildly increased perihilar and  bibasilar opacities, which may represent pulmonary edema, atelectasis,  or infection.  2. Small right pleural effusion.      I have personally reviewed the examination and initial interpretation  and I agree with the findings.    DALLIN RENAE MD   XR Chest Port 1 View    Narrative    Exam: XR CHEST PORT 1 VW, 8/4/2017 2:14 PM    Indication: SOB/fever s/p liver transplant    Comparison: 8/2/2017, 7/31/2017, 7/28/2017    Findings:   A single AP view of the chest was obtained. The enteric tube passes  below the field-of-view. The right IJ catheter tip projects over the  mid SVC. The cardiac silhouette is unchanged. Decreased lung volumes.  No pneumothorax. Linear lucency in the right upper chest is compatible  with skinfold. Improving perihilar and bibasilar opacities. Surgical  clips project over the right upper quadrant.      Impression    Impression:   Decreased lung volumes with improving perihilar and bibasilar  opacities, which may represent atelectasis, pulmonary edema, or  infection.    I have  personally reviewed the examination and initial interpretation  and I agree with the findings.    DALLIN RENAE MD   CT Abdomen Pelvis w Contrast    Narrative    Exam: CT abdomen pelvis with contrast 8/4/2017 7:41 PM.    History: Fever and tachycardia, postoperative.    Comparison: CT 7/25/2017.    Technique: CT images from the lung bases through the symphysis pubis  after the uneventful administration of IV and oral contrast.    Findings:   Enlargement of the spleen. Mild renal pelvocaliectasis. Mild atrophy  of the pancreas. Cholecystectomy. Postsurgical changes of liver  transplant. Trace periportal edema. Otherwise the kidneys, and adrenal  glands are unremarkable. Esophageal and upper abdominal and splenic  varices. IVC filter.    No abnormally dilated loops of small bowel or colon. Diffuse small  bowel and colonic wall thickening. Enteric tube in the proximal  jejunum. Postsurgical changes of right colectomy, right lower quadrant  ileostomy and midline colostomy. Scattered foci of free air. Moderate  to large ascites. There is surrounding diffuse peritoneal enhancement,  surrounding the ascites, with some nodular thickening, for example in  the right lower quadrant. Mild decrease in heterogenous area of  retroperitoneal gas on the right side. Enlarged periaortic lymph nodes  and pelvic lymph nodes, stable. Anasarca. Diverticulosis. Small  anterior abdominal wall soft tissue thickening. Mild bladder wall  thickening likely reactive.    Right pleural effusion and bibasilar atelectasis/consolidation.  Decreased left pleural effusion and associated basilar opacity. No  suspicious bony lesions. Degenerative findings of the spine.  Redemonstration of soft tissue thickening/hematoma in the subcutaneous  soft tissues ventrally.      Impression    Impression:   1. Heterogenous area of right-sided retroperitoneal gas has mildly  decreased in size.  2. Moderate to severe ascites with diffuse peritoneal  enhancement.  This may represent diffuse infection, for example bacterial  peritonitis.  3. Mild residual foci of free air in the abdomen is likely  postsurgical.  4. Diffuse small bowel and colonic wall thickening, similar to prior,  likely reactive to ascites/peritonitis.  5. Findings of portal hypertension including splenomegaly, ascites,  esophageal and upper abdominal varices.  6. Small right pleural effusion with associated atelectasis. Improved  left pleural effusion and basilar consolidation.    I have personally reviewed the examination and initial interpretation  and I agree with the findings.    DIAZ NORMAN MD   IR Paracentesis    Narrative    Procedure:  1. Ultrasound-guided Paracentesis    HISTORY: Patient with history of CASTAÑEDA cirrhosis, status post liver  transplantation admitted with neutropenic enterocolitis, spiking  fevers    COMPARISON: CT abdomen on 8/4/2017    HISTORY: Malignant ascites    Interventional radiologist: Dr. Holloway (IR staff).  IR Fellow: Tito GAR     Procedure details: Patient supine. Left side of abdomen aseptically  prepared and draped. Ultrasound showed a moderate size ascites with  multiple foci of debris and septations.  With ultrasound guidance a 5  Ethiopian Yueh needle/catheter system was inserted into the ascites in  the left lower quadrant. The catheter was advanced off the needle into  the peritoneal space. 250 mL of yellow-brown exudative mildly complex  ascites was drained. After complete drainage, ultrasound showed some  residual fluid with debris. Catheter removed and dressing applied.  Patient tolerated the procedure well.     Medications: 1% lidocaine for local anesthesia.    Nursing: Throughout the procedure the patient's vital signs and oxygen  saturation were continuously monitored and remained stable.    Complications: None.      Impression    IMPRESSION:   Ultrasound-guided LLQ paracentesis performed and 250 mL of  yellow-brown mildly complex fluid  aspirated, 120 mL of which was sent  to microbiology and cytology labs.     PLAN:  Patient to return as needed.    I, JOSE MANUEL CASILLAS MD, attest that I was present for all critical  portions of the procedure and was immediately available to provide  guidance and assistance during the remainder of the procedure.    I have personally reviewed the examination and initial interpretation  and I agree with the findings.    JOSE MANUEL CASILLAS MD   XR Chest Port 1 View    Narrative    EXAM: XR CHEST PORT 1 VW  8/8/2017 1:59 PM      HISTORY: eval for infiltrates    COMPARISON: 8/4/2017    FINDINGS: Portable AP view of the chest obtained. Enteric tube courses  beyond the margins of the film. Right IJ central line tip projects  over the low SVC. The trachea is midline. Cardiac silhouette is within  normal limits. Low lung volumes, similar to prior. Blunting of the  costophrenic angles bilaterally. No pneumothorax. Increased bibasilar  streaky opacities.       Impression    IMPRESSION:   1. Increased bibasilar streaky opacities, compatible with atelectasis  and/or infection.  2. Probable small bilateral pleural effusions.  3. Persistent low lung volumes.    I have personally reviewed the examination and initial interpretation  and I agree with the findings.    DAMIEN LIRA MD   CT Chest Abdomen Pelvis w/o Contrast     Value    Radiologist flags Intraperitoneal free air (Urgent)    Narrative    EXAMINATION: CT CHEST ABDOMEN PELVIS W/O CONTRAST, 8/9/2017 10:39 AM    TECHNIQUE:  Helical CT images from the thoracic inlet through the  symphysis pubis were obtained  without contrast.     COMPARISON: CT abdomen and pelvis a 4 2017, CT cap 7/25/2017, and  radiograph of the chest 8/8/2017.    HISTORY: Fever, abdominal pain, high procalcitonin s/p right  hemicolectomy.  Had retroperitoneal gas on last CT.  Hx liver  transplant, renal insufficiency and hyperkalemia. Cough, fevers,  evaluate for pneumonia    FINDINGS:    Chest: There is a  right IJ central venous catheter with the tip in the  low SVC. Heart size is within normal limits. No pericardial effusion.  The ascending aorta is normal caliber. The main pulmonary artery  measures up to 3.8 cm. There are multiple prominent mediastinal and  axillary lymph nodes. In the right axilla there is enlarged lymph node  to 1.2 cm (series 8 image 110) with a preserved fatty nancy.    Small right and trace left pleural effusions with overlying  consolidations/atelectasis. The central airways are clear.  Additionally there are lower lobe bilateral patchy airspace  consolidations. Mild respiratory motion.    Abdomen and pelvis: There is an NJ tube with the tip in the proximal  jejunum. Postsurgical changes of liver transplant with  cholecystectomy. Unremarkable noncontrast appearance of the liver.  Splenomegaly. Splenic varices. The pancreas and adrenal glands are  unremarkable on this noncontrast CT. Mild bilateral hydronephrosis.  Thickened loops of bowel diffusely and thickened peritoneum.  Infrarenal IVC filter.    Postsurgical changes of ileostomy, and right hemicolectomy. In the  right retroperitoneum there is multifocal gas collection, similar in  appearance to 8/4/2017. New intraperitoneal free air seen in the  anterior abdomen and inferior to the liver (series 8 image 353 and  image 318). No evidence of extravasation of oral contrast. Unchanged  ascites throughout the abdomen. There is bilateral inguinal  lymphadenopathy and retroperitoneal lymphadenopathy, likely reactive.    Bones and soft tissues: Degenerative changes of the spine. No  suspicious bony lesions. Again seen are soft tissue hematomas in the  anterior abdominal soft tissues, not significant change from prior  exam. Diffuse anasarca.      Impression    IMPRESSION:   1. Unchanged right retroperitoneal air with new foci of free  intraperitoneal air in the right upper quadrant. No extravasation of  oral contrast identified.  2. No  significant change in large volume abdominal ascites, thickened  peritoneum and diffuse bowel wall thickening concerning for  peritonitis.  3. Small right and trace left pleural effusions with overlying  atelectasis and patchy consolidations. Overall, the consolidations are  slightly increased since the prior exam. Differential includes  atelectasis or infection.  4. Secondary signs of portal hypertension similar to the prior exam.  5. Unchanged mild bilateral renal pelvocaliectasis.    [Urgent Result: Intraperitoneal free air]    Finding was identified on 8/9/2017 2:04 PM.     Angelica Pennington was contacted by me on 8/9/2017 3:26 PM and verbalized  understanding of the critical result.     I have personally reviewed the examination and initial interpretation  and I agree with the findings.    DIAZ NORMAN MD   CT Retroperitoneal Abscess Drainage    Narrative    Procedures 8/9/2017:  CT-guided retroperitoneal drain placement    History: Perforated bowel, request for drainage catheter placement.    Comparison: CT chest abdomen pelvis same day    Staff: JENS Cullen MD    Fellow: CASIE Acuna MD    Medications:   1. 300 mcg Fentanyl  2. 4 mg Versed    Moderate sedation administered by the IR nurse at the supervision of  the attending. Vital signs and oxygenation continuously monitored. The  patient remained stable throughout the procedure.    Sedation time: 60 minutes of direct face-to-face supervision by the  attending physician    Total DLP: 3554 mGy*cm    Contrast: Not administered    Findings/procedure: Informed consent was not obtained due to altered  mental status of patient and inability to contact wife by telephone.  Given patient's clinical symptoms with rapidly progressing sepsis,  emergency declared by the requesting team. The patient was placed in  the left posterior oblique position on the CT gantry and preprocedural  scan performed. This demonstrated adequate positioning and location of  fluid/air  pocket for drainage. Ten to one volume mixture of 1%  lidocaine without epinephrine was used for local anesthesia.     Under intermittent CT fluoroscopy, 5 Turkmen Yueh catheter was advanced  into the collection. Yueh catheter removed and Amplatz superstiff wire  was advanced into the collection with cranial angulation. Serial  dilation performed up to 22 Turkmen. A 20 Turkmen Thal-Quick catheter  was advanced over the wire, however wire became kinked and there was  difficulty advancing the catheter over the wire. Wire was removed and  catheter was freed from the stiffener and advanced manually, however  on following scan was demonstrated to be malpositioned. Multiple  attempts were made to reposition the catheter through the existing  tract, however these were ultimately unsuccessful. Catheter was  removed.    Given difficulty in repositioned the catheter, the procedure was  performed again with advancement the 5 Turkmen Yueh needle under  intermittent CT fluoroscopy. Amplatz Super Stiff wire was again  advanced into the cavity angled cranially and serial dilation of the  tract was performed as before. 20 Turkmen Thal-Quick catheter was  advanced into the collection and directed cranially and was placed  without difficulty. Postprocedural scan demonstrated adequate  positioning of the catheter in the abscess cavity.    Patient tolerated the procedure well with no immediate complication.  Catheter secured with 2-0 Ethilon retention suture.    Sample of cloudy serosanguineous fluid sent for Gram stain, routine  and anaerobic cultures.      Impression    Impression:  Successful placement of 20 Turkmen Thal-Quick drainage catheter in the  retroperitoneal collection.    Plan:   Patient to return to floor for continued cares. Catheter to gravity  drainage. Flush catheter 3 times daily with 20 cc saline. Patient to  return to interventional radiology when drain outputs are less than 15  cc per day or earlier if  needed.    Procedure performed by Dr. Acuna under my supervision.  I, Dr. Rm Cullen, was present for the entire procedure.    I have personally reviewed the examination and initial interpretation  and I agree with the findings.    RM CULLEN MD   US Abdomen Limited    Narrative    EXAMINATION: US ABDOMEN LIMITED, 8/10/2017 10:31 AM     COMPARISON: CT abdomen 8/9/2017    HISTORY: Evaluate ascites    TECHNIQUE: The abdomen was scanned in standard fashion with  specialized ultrasound transducer using grey scale.    Findings:  Small to moderate volume complex ascites visualized, greatest in the  lower quadrants. Echogenic foci with associated dirty shadowing seen  in the right lower quadrant consistent with air seen on prior CTs.  Right-sided pleural effusion.      Impression    Impression:   1.  Small to moderate volume complex ascites visualized, greatest in  the lower quadrants, left greater than right. Right retroperitoneal  air again noted.  2.  Right-sided pleural effusion.    I have personally reviewed the examination and initial interpretation  and I agree with the findings.    DIAZ NORMAN MD   XR Chest Port 1 View    Narrative    EXAM: XR CHEST PORT 1 VW  8/10/2017 7:15 PM     HISTORY:  RN placed PICC - verify tip placement       COMPARISON: Chest radiograph 8/8/2017    FINDINGS: Single supine AP view of the chest. Enteric tube is coursing  throughout the esophagus with the tip projecting below diaphragm and  field of view. Right PICC line tip over right atrium. Stable mild  enlarged cardiac silhouette. No pneumothorax. Left costophrenic angle  is out of field of view. Small right pleural effusion. Left  retrocardiac and right basilar streaky opacities. Low lung volumes.      Impression    IMPRESSION:   1. Right PICC line tip projecting over right atrium.  2. Left retrocardiac and right basilar streaky opacities, likely  infection or atelectasis.  3. Small right pleural effusion.  4.  Low lung volumes.    I have personally reviewed the examination and initial interpretation  and I agree with the findings.    LUCI HOROWITZ MD   XR Chest Port 1 View    Narrative    EXAM: XR CHEST PORT 1 VW  8/10/2017 7:36 PM     HISTORY:  RN placed PICC - verify tip placement       COMPARISON: Chest radiograph 8/10/2017.    FINDINGS: Single AP view of chest. Right PICC line tip over high right  atrium, slightly retracted since prior. Enteric tube tip over distal  duodenum. Lung apices are out of field of view. Cardiac silhouette is  unchanged. Left retrocardiac and basilar opacities. Indistinct  pulmonary vasculature. Perihilar interstitial opacities. Low lung  volumes.      Impression    IMPRESSION:   1. Right PICC line tip projecting over high right atrium, slightly  retracted since prior.  2. Increasing Pulmonary edema.  3. Left retrocardiac and basilar opacities, atelectasis and/or  infection.    I have personally reviewed the examination and initial interpretation  and I agree with the findings.    LUCI HOROWITZ MD   IR Paracentesis    Narrative    Procedure 8/11/2017:  Ultrasound guided therapeutic and diagnostic paracentesis    Radiologists: Dr. Basilio Dominguez, Dr. Vladimir Ames    Procedure performed by Dr. Ames under my supervision. I, Dr. Basilio Dominguez, was present for the entire procedure.    Medications: 10 ml 1% lidocaine  local anesthesia.     Nursing: Throughout the procedure the patient's vital signs and oxygen  saturations were continuously monitored and remained stable.    Fluoroscopy time: None.    IV contrast: None.    Complications: None.    Consent: Informed written and verbal consent obtained.    Procedure/Findings:   Ultrasound revealed a large septated collection of fluid in the left  lower quadrant. This area was prepped and draped in usual sterile  fashion and anesthetized with 15 ml of 1% lidocaine . Under ultrasound  guidance a 5 Panamanian 10 cm Ringly needle/catheter system was  used to  access the fluid, the needle was removed, and the catheter was  connected to vacuum drainage. Approximately 400 ml of  yellow fluid  was aspirated. 90 mL of the fluid sent for laboratory analysis. Repeat  US revealed septated fluid remaining. The catheter was removed and the  site was cleansed and dressed. The procedure was well tolerated. No  immediate complication.      Impression    Impression: 400 mL of fluid aspirated from left lower quadrant fluid  collection. 90 mL of fluid sent for laboratory analysis.    I have personally reviewed the examination and initial interpretation  and I agree with the findings.    KATE HILLIARD   CT Abdomen Pelvis w/o Contrast     Value    Radiologist flags called; Increasing pneumoperitoneum (Urgent)    Narrative    EXAMINATION: CT ABDOMEN PELVIS W/O CONTRAST, 8/14/2017 11:50 AM    TECHNIQUE:  Helical CT images from the lung bases through the  symphysis pubis were obtained without IV contrast.    COMPARISON: 8/9/2017    HISTORY: s/p liver transplant, and right hemicolectomy with  ileo-stoma, retro-peritoneal collection; please evaluate    FINDINGS:    Devices: Feeding tube tip is in the proximal jejunum. Stable position  of IVC filter. Right-sided drainage catheter has a formed pigtail in  the right retroperitoneum. It has been retracted compared with  8/9/2017 at which time it crossed midline.    Abdomen and pelvis: Stable postsurgical changes of liver  transplantation. Splenomegaly. Stable mild hydronephrosis.  Postsurgical changes of right hemicolectomy with a right lower  quadrant ileostomy and mid abdominal colon mucous fistula. Several  loops of borderline dilated small bowel; however, oral contrast  extends to the ileostomy site. Stable mesenteric edema and ascites now  with increased, scattered foci of gas (series 2, image 71 anteriorly  in the right and series 2, image 126-160).     Complex gas-containing fluid collection in the right  retroperitoneum  extending toward the midline has decreased slightly in size measuring  12 x 4cm, series 2 image 115, previously 14 x 5 cm on 8/9/2017. As  above right lateral approach pigtail catheter coils within this  collection.    Normal appearance of the right kidney. Unchanged mild left  hydronephrosis. Negative adrenal glands. Pancreas is not dilated.  Significant vascular calcifications. Crowe catheter in the urinary  bladder.    Lung bases: Stable loculated right and relatively simple appearing  left pleural effusion at the right base with bibasilar consolidative  opacities.    Bones and soft tissues: No aggressive osseous lesions. Dependent  predominant subcutaneous edema is unchanged compared with 8/9/2017.      Impression    IMPRESSION:   1a. Moderate volume ascites, stable with increased foci of  intraperitoneal gas, potentially relating to catheter flushes,  redistribution of retroperitoneal gas or infection though given recent  surgical procedures on the presence of two ostomies, perforation or  dehiscence is also a consideration.  1b. Thickened peritoneum concerning for peritonitis.  2. Complex right retroperitoneal gas containing collection is stable  to slightly decreased in size compared with 8/9/2017. Pigtail catheter  is in good position.   3. Stable loculated bibasilar pleural effusions and bibasilar  consolidative opacities which could represent infection or  atelectasis.  4. Postsurgical changes of right hemicolectomy. Several loops of  borderline dilated small bowel; however, oral contrast reaches the  ileostomy.  5. Stable left hydronephrosis.    [[facility]: called; Increasing pneumoperitoneum]    This report will be copied to the [facility] to ensure a  provider acknowledges the finding. [facility] is available Monday  through Friday 8am-3:30 pm.     Findings regarding increasing penumoperitoneum were discussed with Dr. Adams by Dr. Eastman over the telephone at  approximately 330 PM.    I have personally reviewed the examination and initial interpretation  and I agree with the findings.    SONIYA GRIJALVA MD   IR Retroperitoneal Abscess Drainage    Narrative    Procedures on 8/15/2017:  1. Ultrasound-guided peritoneal drain placement.    HISTORY: Liver transplant. Bowel ischemia with right hemicolectomy.  New pneumoperitoneum on recent CT, concern for peritonitis.    COMPARISON: CT on 8/14/2017.    Staff: Basilio Dominguez M.D.    Fellow: Karl Ashby M.D.    Procedure performed by Dr. Ashby under my supervision. I, Dr. Basilio Dominguez, was present for the entire procedure.    Monitoring and medications: Patient was placed on continuous  monitoring supervised by the IR nursing staff and IR attending.  Patient remained stable throughout the procedure. 1 mg Versed, 100 um  fentanyl, subtle sedation time 25 minutes.    Procedure/findings: Informed consent was obtained prior to the  procedure. Limited ultrasound images demonstrated appropriate fluid  pocket in the right lower quadrant. The right lower quadrant was  prepped and draped in the usual sterile fashion. Timeout performed.    1% lidocaine was used to anesthetize the skin. Under direct ultrasound  guidance, a 5 Botswanan Yueh was advanced into the fluid collection.  Needle was removed and exchanged for a 035 Bentson wire. Yueh catheter  was removed over the wire and serially dilated to 8 and 10 Botswanan. A  12 Botswanan 35 cm locking pigtail skater was advanced over the wire to  the peritoneal fluid.    60 mL of yellow-tinged ascites was aspirated and sent to lab for  further analysis. Locking mechanism was engaged. Drain sutured in  place. Connected to bag for gravity drainage. Patient tolerated the  procedure well and there were no immediate complications.      Impression    IMPRESSION:  1. Successful ultrasound-guided placement of a right lower quadrant 12  Botswanan 35 cm locking pigtail skater drain. Set to gravity  drainage.  2. 60 mL of yellow-tinged ascites sent to the lab for further  analysis.    I have personally reviewed the examination and initial interpretation  and I agree with the findings.    KATE KHALILESTHA   CT Abdomen Pelvis w/o Contrast    Narrative    EXAMINATION: CT ABDOMEN PELVIS W/O CONTRAST, 8/15/2017 8:15 PM    TECHNIQUE:  Helical CT images from the lung bases through the  symphysis pubis were obtained without IV contrast.. Oral contrast was  administered prior to the study. 60 cc of Omnipaque was administered  via the fistula at the superior aspect of the midline abdominal  incision and an abdominal CT was obtained. An additional 60 cc of  Omnipaque was administered via the fistula and an second CT of the  abdomen was obtained.    COMPARISON: CT abdomen pelvis 8/14/2017.    HISTORY: To assess for GI perforation including remnant distal colon.    FINDINGS:    Opacification of the mucous fistula in the anterior abdominal wall  series 3 image 197. Contrast courses contiguously to the descending  colon and appears to reach the sigmoid. There is limited opacification  distally. No leak is identified. Despite a second administration of  contrast as well as a delayed scan there is limited opacification of  the distal sigmoid colon and rectum. There is unchanged descending  colonic and rectal wall thickening.     Interval placement of right lower quadrant drain with pigtail along  the inferior midline. Decreased volume of associated fluid collection  containing punctate foci of air in the lower mid abdomen. Pigtail  within the right retroperitoneal heterogeneous collection which is  stable in size. Multiple additional loculated collections are  unchanged, for example right upper quadrant anteriorly series 7 image  76 and series 7 image 111. These contain foci of gas.    Stable position of feeding tube tip and IVC filter.    Stable postsurgical changes of liver transplantation with  splenomegaly. Stable mild  bilateral hydronephrosis and unchanged  bladder wall thickening. Postsurgical changes of right hemicolectomy.    Lung bases: Stable bibasilar pulmonary opacities, septal thickening  and loculated pleural effusion, right greater than left.    Bones and soft tissues: No aggressive osseous lesions. Anasarca.      Impression    IMPRESSION:   1. Contrast opacification of the mucous fistula which is contiguous  with the transverse and descending colon. No leak identified. Distal  sigmoid colon and rectum were not well opacified. Unchanged wall  thickening involving the descending colon and rectum.  2. Interval placement of anterior abdominal drainage catheter and  stable position of right retroperitoneal drainage catheter. Multiple  intra-abdominal fluid collections containing foci of gas, the largest  of which has decreased in size secondary to catheter placement.   3. Stable mild bilateral hydronephrosis. Bladder wall thickening may  be reactive or due to a urinary tract infection.  4. Stable bibasilar atelectasis and small pleural effusions right  greater than left.  5. Splenomegaly.    Findings were discussed with Dr. Dacosta at 8:30 PM.    I have personally reviewed the examination and initial interpretation  and I agree with the findings.    ROB STARK MD   XR Finger Port Right G/E 2 Views    Narrative    Exam: 2 views of the right index finger dated 8/19/2017.    COMPARISON: 8/15/2011.    CLINICAL HISTORY: Evaluate for osteomyelitis.    FINDINGS: AP and lateral views of the right index finger were  obtained. The bones are well aligned. No erosive changes. No soft  tissue abnormalities.      Impression    IMPRESSION: No erosive changes in the right index finger to suggest  osteomyelitis.    THEODORE AGUIAR MD   CT Abdomen Pelvis w Contrast    Narrative    EXAMINATION: CT ABDOMEN PELVIS W CONTRAST, 8/23/2017 5:03 PM    TECHNIQUE:  Helical CT images from the lung bases through the  symphysis pubis were  obtained  with IV contrast. Contrast dose: 115ml  [id number]    COMPARISON: CT abdomen/pelvis 8/15/2017    HISTORY: Follow up imaging. Evaulate fluid collections. PO contrast  only. No IV contrast    FINDINGS:  Contrast opacifies the midline anterior abdominal wall mucous fistula  and extends throughout the length of the colon down to the rectum.  There is also contrast opacifying the right lower quadrant ileostomy  and loops of small bowel proximal to this. There are also persistently  dilated loops of nonopacified small bowel, predominantly in the left  upper quadrant, which are increased in diameter since the comparison  study. For example a loop of jejunum in the left upper quadrant  (series 5 image 2:30) measures 4.9 cm in diameter, previously 2.7 cm.  No pneumatosis or portal venous gas.    Moderate volume ascites and extensive mesenteric and periportal edema  is again demonstrated. No intraperitoneal free air.    Redemonstration of multiple ill-defined fluid collections which are  incompletely assessed secondary to lack of intravenous contrast but  are as follows:    Right lower quadrant retroperitoneal collection containing air, fluid,  and debris with a drain in place (series 5 image 289) measures 12.2 x  6.6 cm x 11.3, previously 11.7 x 6.2 x9.4 cm.    Right paracolic gutter retroperitoneal collection immediately adjacent  to the air containing collection described above (series 5 image 238).  This collection is anterior to the right kidney and contains only  fluid. Axial dimensions measure 5.7 x 10.6 cm, previously 7.2 x 9.3  cm. This is contiguous with a larger left lower quadrant collection  containing air and the tip of a right sided approach pigtail catheter  (series 5 image 338) which measures 14.9 x 6.4 cm in axial dimensions,  previously 14.0 x 5.2 cm.    A small right anterior intraperitoneal collections (series 5 image 27)  measures 4.1 x 3.2 cm in axial dimensions, previously 4.7 x 3.6 cm and  a  further lateral collection (series 5 image 217) measures 6.0 x 3.3  cm, previously 7.4 x 4.3 cm. No new collections identified.    Postoperative changes of liver transplantation. Unchanged splenomegaly  and enlarged portal and splenic veins. Normal adrenals. Unchanged  hypodensities in both kidneys consistent with simple cysts. There is  increased bilateral caliectasis without hydroureter. The urinary  bladder is diffusely thick-walled, similar in appearance to the prior  exam. Extensive colonic diverticulosis. The major abdominal  vasculature is patent. IVC filter present, stably positioned.    Small bilateral pleural effusions are mildly increased in volume since  the prior exam. Associated bibasilar atelectasis is again noted. No  pericardial effusion.    No acute or suspicious osseous abnormality. Diffuse anasarca  throughout the abdominal and chest wall soft tissues.      Impression    IMPRESSION:   1. Overall increased size of intraperitoneal and retroperitoneal fluid  collections since 8/15/2017, as described above. In particular, the 2  collections which contain drainage catheters appear mildly increased  in size since 8/15/2017. Other smaller collections are stable to  slightly decreased in size.  2. Worsening small bowel dilation, especially of the jejunum in the  left upper quadrant, favored to represent adynamic ileus although  developing small bowel obstruction cannot be excluded.  3. Moderate volume ascites and extensive mesenteric and periportal  congestion is increased since 8/15/2017.  4. Unchanged splenomegaly and portal venous collaterals throughout the  upper abdomen.  5. Slight increase in volume of small bilateral pleural effusions and  associated bibasilar atelectasis.   6. Anasarca.    I have personally reviewed the examination and initial interpretation  and I agree with the findings.    BLANCA HUYNH MD   XR Chest 1 View    Narrative    XR CHEST 1 VW  8/24/2017 9:08 AM      HISTORY:  cough, chest pain    COMPARISON: 8/10/2017    FINDINGS: Single portable AP view of the chest upright. Film is  underpenetrated. Enteric tube proceeds below the diaphragm. Right  upper extremity PICC tip projects over the mid SVC. Cardiac silhouette  remains enlarged. Prominent and indistinct pulmonary vasculature.  Patchy perihilar and bibasilar opacities. Question of superimposed  consolidative opacities in the left retrocardiac region. No  pneumothorax. The visualized upper abdomen, osseous structures, and  soft tissues are unremarkable.      Impression    IMPRESSION:   1. Enteric tube and PICC in stable positions.  2. Pulmonary edema.  3. Denser left retrocardiac opacities suggest the possibility of  superimposed infection    I have personally reviewed the examination and initial interpretation  and I agree with the findings.    DAMIEN LIRA MD   IR Peritoneal Abscess Drainage    Narrative    Procedures on 8/24/2017   1. Image guided placement of left intraperitoneal abscess drainage  catheter.  2. Image guided replacement of right retroperitoneal abscess drainage  catheter.    HISTORY: Status post liver transplantation, complicated by bowel  injury, and multiple fluid collections. Request for drain in the left  lower quadrant intraperitoneal abscess collection, and replacement of  right retroperitoneal abscess drainage catheter.    COMPARISON: CT on 8/23/2017.    Staff: STEPHANIE Holloway M.D.    Fellow: Karl Ashby M.D.    Monitoring: Patient was placed on continuous monitoring supervised by  the IR nursing staff and IR attending. Patient remained stable  throughout the procedure.     Total sedation time: 40 minutes.    MEDICATIONS:  200 mcg fentanyl IV  2 mg Versed IV    Fluoroscopy time: 2.9 minutes.    Contrast: 10 mL Visipaque 320.    Procedure/findings: Informed consent was obtained. The patient's left  lower quadrant right lower quadrant drain were prepped in the usual  sterile fashion. A timeout was  performed.    First, attention was given to patient's left lower quadrant. Suitable  intraperitoneal fluid plaque was identified with ultrasound. 1%  lidocaine was used to anesthetize the skin. Under direct ultrasound  guidance, a 5 Irish Yueh was advanced into the fluid collection. A  picture was saved in patient's medical record. This is exchanged over  a wire for serial dilators, and ultimately a 12 Irish locking pigtail  skater catheter. Position confirmed with contrast and flushed. Sutured  in place with 2-0 Ethilon. Sterile dressing applied.    Next, attention was given to patient's existing right lower quadrant  retroperitoneal drain. Through patient's existing tube, a wire was  advanced into the retroperitoneal collection and the existing tube was  removed over the wire. Tract was serially dilated, and ultimately a 24  Irish Thal-Quick drainage catheter was advanced into the  retroperitoneal fluid collection. Positioned confirmed with contrast  and flushed. Sutured in place with 2-0 Ethilon. Sterile dressing  applied.    Patient tolerated the procedure well and there are no immediate  complications.      Impression    IMPRESSION:  1. Successful image guided placement of left intraperitoneal 12 Irish  pigtail locking skater catheter.  2. Successful replacement over the wire of right retroperitoneal 24  Irish Thal-Quick drainage catheter.  3. As discussed with the surgical team multiple times throughout the  last 2 weeks given the diffuse infection throughout the peritoneum and  in the right retroperitoneum it is unlikely that percutaneous drainage  will ultimately fully control this patient's infection.    PLAN:  1. Routine post procedure cares.  2. Flush each drainage catheter with 10cc normal saline per shift.    I, JOSE MANUEL CASILLAS MD, attest that I was present in the procedure room  for the entire procedure.    I have personally reviewed the examination and initial interpretation  and I agree with the  findings.    JOSE MANUEL HOLLOWAY MD   IR Abscess Tube Change    Narrative    Procedures on 8/24/2017   1. Image guided placement of left intraperitoneal abscess drainage  catheter.  2. Image guided replacement of right retroperitoneal abscess drainage  catheter.    HISTORY: Status post liver transplantation, complicated by bowel  injury, and multiple fluid collections. Request for drain in the left  lower quadrant intraperitoneal abscess collection, and replacement of  right retroperitoneal abscess drainage catheter.    COMPARISON: CT on 8/23/2017.    Staff: STEPHANIE Holloway M.D.    Fellow: Karl Ashby M.D.    Monitoring: Patient was placed on continuous monitoring supervised by  the IR nursing staff and IR attending. Patient remained stable  throughout the procedure.     Total sedation time: 40 minutes.    MEDICATIONS:  200 mcg fentanyl IV  2 mg Versed IV    Fluoroscopy time: 2.9 minutes.    Contrast: 10 mL Visipaque 320.    Procedure/findings: Informed consent was obtained. The patient's left  lower quadrant right lower quadrant drain were prepped in the usual  sterile fashion. A timeout was performed.    First, attention was given to patient's left lower quadrant. Suitable  intraperitoneal fluid plaque was identified with ultrasound. 1%  lidocaine was used to anesthetize the skin. Under direct ultrasound  guidance, a 5 Mozambican Yueh was advanced into the fluid collection. A  picture was saved in patient's medical record. This is exchanged over  a wire for serial dilators, and ultimately a 12 Mozambican locking pigtail  skater catheter. Position confirmed with contrast and flushed. Sutured  in place with 2-0 Ethilon. Sterile dressing applied.    Next, attention was given to patient's existing right lower quadrant  retroperitoneal drain. Through patient's existing tube, a wire was  advanced into the retroperitoneal collection and the existing tube was  removed over the wire. Tract was serially dilated, and ultimately a 24  M[place]ican  Thal-Quick drainage catheter was advanced into the  retroperitoneal fluid collection. Positioned confirmed with contrast  and flushed. Sutured in place with 2-0 Ethilon. Sterile dressing  applied.    Patient tolerated the procedure well and there are no immediate  complications.      Impression    IMPRESSION:  1. Successful image guided placement of left intraperitoneal 12 Polish  pigtail locking skater catheter.  2. Successful replacement over the wire of right retroperitoneal 24  Polish Thal-Quick drainage catheter.  3. As discussed with the surgical team multiple times throughout the  last 2 weeks given the diffuse infection throughout the peritoneum and  in the right retroperitoneum it is unlikely that percutaneous drainage  will ultimately fully control this patient's infection.    PLAN:  1. Routine post procedure cares.  2. Flush each drainage catheter with 10cc normal saline per shift.    I, JOSE MANUEL CASILLAS MD, attest that I was present in the procedure room  for the entire procedure.    I have personally reviewed the examination and initial interpretation  and I agree with the findings.    JOSE MANUEL CASILLAS MD   CT Abdomen Pelvis w/o Contrast    Narrative    Exam: CT abdomen pelvis without contrast 8/31/2017 11:47 AM.    History: 51-year-old male with history of cirrhosis, status post liver  transplant, colitis, sepsis, and retroperitoneal abscesses.    Comparison: Multiple prior abdominal pelvic CTs including most recent  from 8/23/2017, 8/15/2017, and 8/14/2017.    Technique: Using multidetector thin collimation helical acquisition  technique, axial, coronal and sagittal CT images from the lung bases  through the symphysis pubis were obtained without IV contrast.    Dose: 1394 mGy*cm    Findings:   Slightly decreased size of multiple ill-defined fluid collections in  the abdomen, poorly delineated secondary to noncontrast technique,  including:  -10.0 x 4.2 cm right lower quadrant retroperitoneal  collection  containing gas, debris, and a surgical drain (series 5, image 299),  previously measuring approximately 11.2 x 4.5 cm when measured in a  similar fashion.  -Ill-defined fluid collection along the right paracolic gutter  measuring approximately 8.3 x 2.0 cm (series 5, image 272), previously  10.2 x 2.9 cm. This collection is contiguous with a large lower  abdominal fluid collection containing gas and debris with unchanged  right and new left lower quadrant abdominal drains (series 5, image  358), decreased in size compared to 8/23/2017, now measuring  approximately 14.2 x 6 cm, previously 14.9 x 4.7 cm in  - Anterior right upper quadrant collection (series 5, image 182) now  measures 4.5 x 2.9 cm, previously 4.7 x 3.6 cm.  - Lateral right upper quadrant collection measuring 3.1 x 5.3 cm  (series 5, image 216), previously 6.0 x 3.3 cm.     Stable abdominal ascites, including fluid in the anterior abdomen  along the left upper quadrant. Unchanged splenomegaly, measuring 22 cm  in craniocaudal dimension. Postoperative changes of liver  transplantation, with multiple vascular collaterals in the lesser sac,  and near the spleen. Splenic and portal veins are enlarged. The  gallbladder surgically absent. Stable bilateral pelvocaliectasis,  without hydroureter. The bladder is normal in morphology. Prostate is  not enlarged. Multiple loops of mildly dilated small bowel are not  significantly changed, without focal transition point, presumably  representing ileus. The colon is decompressed, with multiple colonic  diverticula. Postoperative changes right hemicolectomy and right lower  quadrant colostomy. Left mid abdominal colonic mucous fistula.  Intra-abdominal vasculature is normal in caliber. Infrarenal IVC  filter in stable position.    Unchanged small bilateral pleural effusions, loculated on the right.  Stable appearance of right greater than left bibasilar atelectasis  with partial consolidation. Some  increased nodular groundglass  opacities in the right middle lobe compared to 8/23/2017. Coronary  artery calcifications.    Mild multilevel degenerative change in the thoracolumbar spine. No  suspicious osseous lesions. Diffuse anasarca.      Impression    Impression:   1. Slightly decreased size of multiple intraperitoneal and  retroperitoneal fluid collections when compared to 8/23/2017, the  majority of which contain gas and debris. Stable position of 2  right-sided pigtail drainage catheters. New left-sided pigtail  drainage catheter.  2. New nodular groundglass opacities in the right middle lobe may  represent infection and/or aspiration.  3. Stable appearance of multiple dilated loops of small bowel without  transition point, likely representing adynamic ileus.  4. Moderate volume ascites and mesenteric vascular congestion is  unchanged from 8/23/2017. Stable diffuse anasarca.  5. Stable size of bilateral pleural effusions and bibasilar  atelectasis.  6. Postoperative changes of liver transplantation with sequelae of  portal hypertension including splenomegaly and extensive portosystemic  vascular collaterals.    I have personally reviewed the examination and initial interpretation  and I agree with the findings.    SONIYA GRIJALVA MD     *Note: Due to a large number of results and/or encounters for the requested time period, some results have not been displayed. A complete set of results can be found in Results Review.

## 2017-07-26 NOTE — PLAN OF CARE
Problem: Goal Outcome Summary  Goal: Goal Outcome Summary  PT 4A:  Cancel PT this AM, pt with air in abdomen and potentially to OR today.  Rescheduled for 7/27

## 2017-07-26 NOTE — PLAN OF CARE
Problem: Goal Outcome Summary  Goal: Goal Outcome Summary  Speech Language Therapy Discharge Summary     Reason for therapy discharge:    No further expectations of functional progress.     Progress towards therapy goal(s). See goals on Care Plan in [facility] electronic health record for goal details.  Goals not met.  Barriers to achieving goals:   Pt intubated and no longer appopriate for participation in skilled dysphagia intervention.  .     Therapy recommendation(s):    No further therapy is recommended.  Please re-consult when medically appropriate.       Discharge diet: Dysphagia diet level 3 and thin liquids

## 2017-07-26 NOTE — PROGRESS NOTES
D/I:  Overnight vented and sedated on propofol.  Opened eyes to pain, SANTIAGO, withdrew to pain, moved all extremities spontaneously also, breathing over vent, did not follow commands, and did not nod head to questions.  Discussed with  about starting a narcotic drip, but  only wanted to use PRN dilaudid and not a drip.      Temp (max)= 100.8 (ax).  CVP=5-6 ( notified and no inventions ordered, patient is on scheduled albumin).  Bladder pressures=9.  MIVF started. NPO (TF kept off) since free air in abdomen per .  [place] rectal fellow assessed patient and spoke with  about plan for patient.  [place] rectal fellow also spoke with patient's wife on phone about plan.  Plan was to not go to OR and continue to watch overnight.      BMx4 (1 small, 1 medium, and 2 large stools).      A:  0400 hgb=5.9 ( notified within 5 minutes of labs tech calling this RN, hgb rechecked, hgb=5.9 (recheck and  notified), and 1 unit of PRBCs ordered and given.  0400 labs- K=3.5, Cr=3.09 (trending up), LA=0.6 (trending down), iij=650 (trending down from 168 from yesterday).      P:  Continue to monitor.  Reported off to dayshift RN about addressing SICU daytime team and nutrition on rounds about possibly starting TPN since unable to run TF.  See flowsheets for details.

## 2017-07-26 NOTE — PROGRESS NOTES
[facility]    Transplant Infectious Diseases Inpatient Progress note      Camacho Bhagat MRN# [id number]   YOB: 1965 Age: 51 year old   Date of Admission: 7/4/2017  4:45 AM  Transplant: 3/4/2017 (Liver), Postoperative day: 144            Recommendations:   1. Continue broad spectrum meropenem/daptomycin/micafungin.   2. Continue GCV IV but at dose to 2.5 mg/kg q12 hr. (higher than recommended per package insert for possible resistance)  3. If there are plans to go to OR please send abdominal LN for biopsy to rule out PTLD.   4. Repeat CMV PCR 7/27/2017.    5. EBV PCR every other week (next on 8/1/2017).  6. CPK weekly (next 8/1/2017).     Critically ill patient.         Summary of Presentation:   Transplants:  3/4/2017 (Liver), Postoperative day:  144     This patient is a 51 year old male with CASTAÑEDA s/p OLT in 3/2017. Basilixima for induction.   MMF was held due to neutropenia and multiple infections upon admission. TAC was held 7/25/2017 for perforated viscus.      Presented with colitis, s/p exploratory laparotomy. Attributed to neutropenic colitis.   Then started to develop CMV viremia (primary infection)  Now with perforated viscus.         Active Problems and Infectious Diseases Issues:   1. Severe sepsis picture 7/25/2017.   2. Perforated viscus with abdominal abscess.    Continue broad spectrum ABx with meropenem/daptomycin/micafungin.   Check weekly CPK while on daptomycin.     3. Neutropenic vs CMV vs PTLD colitis (without ischemia per exp lap upon admission)   4. CMV primary infection (donor derived)  5. EBV primary infection (donor derived)  The presentation was thought to be due to neutropenia-induced colitis. Later during admission CMV viremia ensued and the possibility of CMV colitis was entertained, the CMV viremia was at low level and colonoscopy was recommended by ID to ascertain whether the patient has end organ damage with CMV colitis. Colonoscopy done on  7/21/2017 with poor prep and negative random biopsies.   Finished 14 days of ertapenem on 7/18/2017 for colitis. The neutropenia was attributed to drugs (MMF/bactrim/VGCV, all were DCed prior to presentation).   Now with perforated viscus and peritoneal gas on broad spectrum ABx.     The primary CMV infection is donor derived following discontinuation of VGCV and in part due to acute illness as well.   Whether the patient also has CMV colitis is not clear, random colon biopsies were negative. The patient needs to be treated for primary CMV infection anyway due to CMV viral load above the threshold of 1500 IU/mL.   IV GCV was initiated 7/20/2017. We should continue to follow weekly CMV PCR.   Since the viremia occurred while off and on VGCV for ppx, and given potential CMV colitis I am using higher dose of GCV for potential resistance.     Random colon biopsies were also not suggestive of PTLD.   The abdominal LA is likely reactive but if the patient goes to OR, we can always check LN biopsy for possible PTLD.   Continue to monitor EBV PCR every other week.   Ascitic fluid cytology and leukemia/lymphoma studies negative.   MRI of brain not suggestive of CNS PTLD.     6. EJ   Due to severe sepsis.   We are adjusting the doses of ABx accordingly.     7. Encephalopathy.   Due to sepsis.   With negative MRI of brain for lesions.     8. 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    These are likely all colonizers or contaminants.   The patient was started on daptomycin for severe sepsis and colonization with VRE.     9. Staphylococcus capitis in ascitic fluid 7/5/2017   Contamination.     10. Rhinovirus in BAL 7/15/2017  Likely to be due to chronic shedding since the patient's main presentation was mostly abdominal not respiratory.      Other Infectious Disease issues include  - PCP prophylaxis: bactrim DCed in June of 2017 due to  neutropenia.   - Serostatus: CMV D+/R-, EBV D+/R-, HSV1?/2?, VZV ?   Now he's on GCV IV.   - Immunization status: up-to-date  - Gamma globulin status: >1660 as of 7/24/2017.       Discussed with ICU pharmacy.   Attestation:  I interviewed the patient and obtained history from the patient, and by reviewing the patient's chart including outside records, microbiological data, and radiological data. All data are summarized in this notes.  Naseem FERGUSONDuy Figueroa   Pager: [phone]  07/26/2017        Interim History:  7/26/2017: intubated, open eyes to pain stimuli and voice stimuli, does not follow commands. NOT on pressors.     7/21/2017: Underwent colonoscopy but with poor prep, it was poor study. The patient started to spike intermittent low grade fevers.    7/25/2017: Patient is obtunded, shallow breathing. Looking septic. Was transferred to SICU and was intubated.     HPI:  In summary:  CASTAÑEDA s/p OLT in 3/2017.   Presented with abdominal pain and underwent exploratory laparotomy which was not c/w with ischemia. It was felt to be related to neutropenia.   The patient has neutropenia since June of 2017 attributed to immunosuppressive therapy/bactrim/VGCV.   Bactrim DCed June of 2017 and VGCV was DCed at unknown tome.     The patient was started on broad spectrum ABx with persistent fever.   Bronchoscopy done 7/12/2017 grew single colony of VRE which was considered a colonizer.   Ascitic fluid checked on 7/5/2017 and grew S capitis considered a contaminant.   Repeat BAL on 7/15/2017 and repeat paracentesis on 7/7/14/2017 were unremarkable for growth but the ascitic fluid was exudative.     The course was complicated by thrombosis of the right radial artery with ischemia to to the tip of the right index finger.   He also has ischemia to the right hallux and second toe.     The patient finished a course of ertapenem, his mental status started to improve.   CMV PCR became detected at low level and workup for possible CMV  colitis was initiated. Colonoscopy was with poor prep and random biopsies were negative for colitis including CMV colitis. GCV was initiated on 7/20/2017 (repeat CMV PCR was around 1900 IU/mL).     The patient started to spike low grade fever on 7/21/2017, on 7/25/2017 he suffered from acute respiratory distress and became obtunded with distended abdomen. He was transferred to ICU and was intubated. The patient was started on daptomycin/meropenem/micafungin. GCV dose was increased   CT c/a/p showed large area of extraluminal gas with possible feculent material.         ROS:  ROS was unobtainalbe due the patient's poor mentation and intubation.                  Pysical Examination:  Temp: 100.6  F (38.1  C) Temp src: Axillary BP: 137/65 Pulse: 90 Heart Rate: 86 Resp: 20 SpO2: 98 % O2 Device: Mechanical Ventilator Oxygen Delivery: 15 LPM  Vitals:    07/17/17 0600 07/19/17 0936 07/24/17 0254 07/25/17 0900   Weight: 93.2 kg (205 lb 7.5 oz) 107 kg (235 lb 12.8 oz) 106.9 kg (235 lb 11.2 oz) 112 kg (246 lb 14.4 oz)    07/26/17 0400   Weight: 106.2 kg (234 lb 2.1 oz)     Constitutional: intubated and sedated, responds to voice stimuli.   CVS: RRR, normal S1/S2, no murmur.   Lungs: mildly coarse BS bilaterally anteriorly, no accessory muscle use.   Abdomen: less distended, no masses, no bruit, normal BS.   Extremities: +2 pitting edema of bilateral lower extremities, ischemic right hallux and right second toe, also ischemic tip of left second toe, and right index, no ulcers, normal ROM of all joints, no swelling or erythema of any of joints and no tenderness to palpation.   Genitalia: vazquez catheter in place.   Skin: as the exam of the extremities, no induration, fluctuation or discharge,and no rash       Medications:  Medications that Require Transfusion:     propofol (DIPRIVAN) infusion 55 mcg/kg/min (07/26/17 0850)     IV fluid REPLACEMENT ONLY       insulin (regular) Stopped (07/25/17 2028)     NaCl 100 mL/hr at 07/25/17  2310     IV fluid REPLACEMENT ONLY 25 mL/hr at 07/20/17 0430   Scheduled Medications:     meropenem  1 g Intravenous Q12H     DAPTOmycin (CUBICIN) intermittent infusion  8 mg/kg (Dosing Weight) Intravenous Q24H     micafungin  100 mg Intravenous Q24H     ganciclovir (CYTOVENE) intermittent infusion  5 mg/kg (Dosing Weight) Intravenous Q12H     albumin human  25 g Intravenous Q6H     protein modular  1 packet Per Feeding Tube BID     fludrocortisone  100 mcg Oral BID     aspirin  80 mg Oral Daily     insulin aspart   Subcutaneous TID w/meals     multivitamins with minerals  15 mL Per Feeding Tube Daily     heparin  5,000 Units Subcutaneous Q8H     pantoprazole  40 mg Oral or Feeding Tube Daily     sodium chloride (PF)  3 mL Intracatheter Q8H       Laboratory Data:   No results found for: ACD4    Inflammatory Markers    Recent Labs   Lab Test  07/05/17   1810  03/05/17   0358  11/23/16   0813  11/16/16   0706  11/15/16   1039  11/07/16   0759  11/03/16   0949  11/01/16   0853   08/15/11   1151  04/11/11   1209  01/03/11   1246  02/15/10   1232   SED   --    --   45*   --    --    --    --    --    --   11  14  17*  25*   CRP  354.0  20.0*  58.0*  59.1*  49.8*  66.0*  140.0*  150.0*   < >  11.1*  9.0*  11.7*  17.5*    < > = values in this interval not displayed.       Immune Globulin Studies     Recent Labs   Lab Test  07/24/17   0705  08/15/11   1243   IGG  1660*  1160   IGG1   --   734   IGG2   --   294   IGG3   --   7*   IGG4   --   59       Metabolic Studies       Recent Labs   Lab Test  07/26/17   0522  07/26/17   0431  07/25/17   1651  07/25/17   0945   07/24/17   0705   07/23/17   0625  07/22/17   0638   07/06/17   0316   NA   --   140  138  137   --   142   --   144  147*   < >  143   POTASSIUM   --   3.5  4.4  4.0   --   3.8   --   3.8  3.8   < >  5.0   CHLORIDE   --   108  106  104   --   112*   --   112*  116*   < >  116*   CO2   --   23  24  26   --   24   --   23  23   < >  18*   ANIONGAP   --   9  8  7    --   6   --   9  7   < >  9   BUN   --   74*  79*  77*   --   65*   --   62*  59*   < >  62*   CR   --   3.09*  2.90*  2.60*   --   1.88*   --   1.76*  1.63*   < >  2.75*   GFRESTIMATED   --   21*  23*  26*   --   38*   --   41*  45*   < >  24*   GLC   --   129*  176*  382*   --   226*   --   237*  184*   < >  139*   A1C   --    --    --    --    --    --    --    --    --    --   Canceled, Test credited   Below Assay Range  NOTIFIED LEONARD ONEILL AT 0538 ON 7/6/17 BY CHRISSY     JACOB   --   7.9*  8.0*  7.6*   --   7.8*   --   7.6*  8.1*   < >  6.9*   PHOS   --   4.1  4.8*   --    --    --    --    --    --    < >  5.5*   MAG   --   1.9  1.9   --    --    --    --    --    --    < >  2.1   LACT  0.6*   --   2.3*   --    < >   --    < >   --    --    < >  1.5   CKT   --    --    --   40   --    --    --    --    --    --    --     < > = values in this interval not displayed.       Hepatic Studies    Recent Labs   Lab Test  07/25/17   1651  07/25/17   0945  07/25/17   0017  07/24/17   0705  07/23/17   0625  07/21/17   0615  07/19/17   0621   07/11/17   0328   07/05/17   1810   BILITOTAL  0.6  0.5   --   0.4  0.3  0.5  0.7   < >  0.5   < >   --    ALKPHOS  242*  317*   --   264*  224*  297*  201*   < >  158*   < >   --    ALBUMIN  2.0*  1.9*   --   1.7*  1.9*  1.9*  2.0*   < >  1.8*   < >   --    AST  61*  90*   --   86*  63*  75*  60*   < >  34   < >   --    ALT  50  60   --   54  47  51  50   < >  27   < >   --    LDH   --    --   258*   --    --    --    --    --    --    --   289*   GGT   --    --    --    --    --    --    --    --   58   --    --     < > = values in this interval not displayed.       Pancreatitis testing    Recent Labs   Lab Test  07/24/17   0705  07/17/17   0630  07/12/17   0342  07/10/17   0340  07/05/17   0346  03/05/17   0358  03/03/17   1458  02/21/17   1520  12/09/16   1159  02/08/16   1144   09/30/10   1384   AMYLASE   --    --    --    --    --   53  70   --    --    --    --   82   LIPASE    --    --    --    --    --   216   --   326  161   --    --   138   TRIG  303*  168*  114  177*  174*   --    --    --    --   99   < >   --     < > = values in this interval not displayed.       Hematology Studies      Recent Labs   Lab Test  07/26/17   0522  07/26/17   0431  07/25/17   1651  07/25/17   0945  07/24/17   0705  07/23/17   0625  07/22/17   0638  07/21/17   0615   WBC   --   7.7  10.3  11.9*  6.4  4.9  4.5  3.9*   ANEU   --   5.6   --   10.8*  3.7  2.3  2.3  2.2   ALYM   --   1.6   --   0.4*  2.3  2.1  1.8  1.2   ZINA   --   0.3   --   0.3  0.3  0.3  0.4  0.3   AEOS   --   0.1   --   0.1  0.1  0.1  0.1  0.1   HGB  5.9*  5.9*  7.5*  8.8*  7.8*  6.9*  7.4*  7.0*   HCT   --   17.6*  23.4*  27.6*  24.8*  22.6*  24.1*  22.8*   PLT   --   109*  141*  168  126*  120*  136*  118*       Arterial Blood Gas Testing    Recent Labs   Lab Test  07/25/17   1746  07/25/17   1037  07/08/17   0902  07/06/17   2334  07/06/17   0015   PH  7.32*  7.14*  7.32*  7.26*  7.27*   PCO2  42  69*  35  37  35   PO2  81  103  96  85  109*   HCO3  22  23  18*  17*  16*   O2PER  40.0  7L  30  30.0  30.0        Urine Studies     Recent Labs   Lab Test  07/25/17   1205  07/19/17   1150  07/11/17   1105  07/06/17   1441  06/06/17   1605   URINEPH  5.0  5.0  5.0  5.0  5.0   NITRITE  Negative  Negative  Negative  Negative  Negative   LEUKEST  Trace*  Negative  Negative  Trace*  Negative   WBCU  5*  2  <1  9*  1       Vancomycin Levels     Recent Labs   Lab Test  07/06/17   0316  10/28/16   1405   VANCOMYCIN  22.1  14.4       Tacrolimus levels    Invalid input(s): TACROLIMUS, TAC, TACR  Transplant Immunosuppression Labs Latest Ref Rng & Units 7/26/2017 7/25/2017 7/25/2017 7/25/2017 7/24/2017   Tacro Level 5.0 - 15.0 ug/L - - - 13.1 -   Tacro Level - - - - Not Provided -   Creat 0.66 - 1.25 mg/dL 3.09(H) 2.90(H) 2.60(H) - 1.88(H)   BUN 7 - 30 mg/dL 74(H) 79(H) 77(H) - 65(H)   WBC 4.0 - 11.0 10e9/L 7.7 10.3 11.9(H) - 6.4   Neutrophil % 73.2 -  91.0 - 58.2   ANEU 1.6 - 8.3 10e9/L 5.6 - 10.8(H) - 3.7       CSF testing     Recent Labs   Lab Test  06/11/09   0225   CWBC  1   CRBC  2   CGLU  104*   CTP  54         Microbiology:  Ascites  7/1714/2017 negative to date.     7/5/2017 S acpitis    BAL   7/15/2017 yeast and Rhinovirus     7/12/2017 single colony of VRE, C albicans/dubliniensis   Sputum  7/11/2017 VRE and Coag Neg Staph     Bcx   7/25/2017 negative to date.     7/15/2017 negative     Last check of C difficile  C Diff Toxin B PCR   Date Value Ref Range Status   10/27/2016  NEG Final    Negative  Negative: Clostridium difficile target DNA sequences NOT detected, presumed   negative for Clostridium difficile toxin B or the number of bacteria present   may be below the limit of detection for the test.   FDA approved assay performed using [employer] GeneXpert real-time PCR.   A negative result does not exclude actual disease due to Clostridium difficile   and may be due to improper collection, handling and storage of the specimen or   the number of organisms in the specimen is below the detection limit of the   assay.         Virology:  CMV viral loads    Recent Labs   Lab Test  07/20/17   1427  07/18/17   0530  07/15/17   1553  07/10/17   0340  07/03/17   1032   CSPEC  Plasma  Plasma  Bronchoalveolar Lavage  EDTA PLASMA  Plasma, EDTA anticoagulant   CMVLOG  3.3*  3.0*  3.6*  2.2*  <2.1       CMV viral loads    Log IU/mL of CMVQNT   Date Value Ref Range Status   07/20/2017 3.3 (H) <2.1 [Log_IU]/mL Final   07/18/2017 3.0 (H) <2.1 [Log_IU]/mL Final   07/15/2017 3.6 (H) <2.1 [Log_IU]/mL Final   07/10/2017 2.2 (H) <2.1 [Log_IU]/mL Final   07/03/2017 <2.1 <2.1 [Log_IU]/mL Final   06/26/2017 Not Calculated <2.1 [Log_IU]/mL Final       CMV resistance testing  No lab results found.  No results found for: CMVCID, CMVFOS, CMVGAN    USCNS Invalid input(s): USCN, USCNS    USCNS No components found for: USCN, USCNS      No results found for: H6RES    EBV DNA Copies/mL    Date Value Ref Range Status   07/18/2017 012049 (A) EBVNEG [Copies]/mL Final         Pathology   Colon biopsies 7/21/2017   A: Colon biopsy, ascending   B: Colon biopsy, descending   FINAL DIAGNOSIS:   A. COLON BIOPSY, ASCENDING:   - Colonic mucosa with no significant histologic abnormality   - Negative for intraepithelial lymphocytosis or deposition of   subepithelial thick collagenous band   B. COLON BIOPSY, DESCENDING:   - Crypt architecture distortion, consistent with healed prior injury   - Negative for active inflammation or dysplasia   - Negative for intraepithelial lymphocytosis or deposition of   subepithelial thick collagenous band     Cytology of ascitic fluid 7/25/2017   Pending   Cytology of ascitic fluid 7/14/2017.   Peritoneal fluid, ascites:   - Negative for malignancy   - Acute and chronic inflammation present   Specimen Adequacy: Satisfactory for evaluation.   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   INTERPRETATION:   Ascites fluid:        Rare to absent viable B cells     COMMENT:   This specimen was collected on 7/14/17 but was not ordered/delivered to   lab/run until 7/18/17 - results should be interpreted with caution due   to low cellularity/viability.     Due to limited cellularity we were only able to analyze the B cells.   There is no immunophenotypic evidence of B cell non-Hodgkin lymphoma.   Neoplastic cells, including large cells, may not survive specimen   processing. This sample may not be representative. Final interpretation   requires correlation with morphologic and clinical features.       Imaging:  CXR 7/25/2017 reviewed by myself   Impression:   1. Right IJ catheter tip projects over the superior cavoatrial  junction.  2. Mild cardiomegaly with small bilateral pleural effusions.  3. Bibasilar atelectasis versus consolidation.    CT c/a/p 7/25/2017 reviewed by myself.   IMPRESSION:   1. New large heterogeneous area of right-sided retroperitoneal gas  which is highly  concerning for an infectious process. Given the  history of prior neutropenic colitis and biopsies, there could also be  a component of stool from a ruptured viscus, despite the lack of  surrounding bowel loops and no extraluminal oral contrast. Surgical  consultation is recommended.   2. Bibasilar atelectasis versus consolidation with small bilateral  pleural effusions.  3. Extensive mesenteric and soft tissue edema with large volume  ascites. Numerous mesenteric and retroperitoneal lymph nodes which are  presumably reactive.  4. Postsurgical changes of liver transplant.  CT c/a/p7/13/2017 Reviewed by myself.   IMPRESSION:   1. Improved moderate right-sided pleural effusion and resolution of  left-sided pleural effusion. There remain large areas of  atelectasis/consolidation in both lungs, including in the left lower  lobe despite resolution of left-sided pleural effusion. Superimposed  infectious process cannot be excluded.  2. Moderate-large amount of ascites increased from 7/8/2017, which  makes it difficult to evaluate for the presence or absence of  inflammatory change or infectious process in the abdomen or pelvis. No  intra-abdominal abscess.  3. Stable small presumed hematoma within the ventral abdominal wall  fat.  4. Splenomegaly.    MRI brain 7/20/2017.   Impression:  1. Significant image degradation due to motion artifact, with no  definite acute intracranial pathology. No abnormal contrast enhancing  intracranial lesions.  2. Mucosal thickening in the sphenoid and maxillary sinuses and left  greater than right mastoid fluid are nonspecific in the setting of  recent intubation.    Naseem Figueroa  Pager: [phone]

## 2017-07-26 NOTE — PROGRESS NOTES
Nutrition Progress Note - Discussion of POC on SICU rounds; f/u for progress towards previous nutrition POC (see previous 7/25 reassessment for details)    -Enteral access: replaced NJT + Bridle yesterday (confirmed on Abd CT)  -EN: held since yesterday with events warranting return to ICU; ordered for Nepro @ goal 60 ml/hr + Prosource  -GI: noted did paracentesis (removed 5 L) yesterday and Abd CT report yesterday noting neutropenic colitis with question of ruptured viscus.    Interventions:  Collaboration and Referral of Nutrition care - Discussed plan for FEN/GI on rounds with Providers who plan to discuss w/u of colonic perf with Transplant team to evaluate if approp to resume TF vs need CPN but decision made to hold off on anything today (therefore, this RD discontinued Nepro and Prosource as if/when to resume TFs will need to change to fiber-free TF formula likely).    Janice Christianson ,RD, LD, CNSC (pgr [phone])

## 2017-07-26 NOTE — PROGRESS NOTES
Nephrology Progress Note  07/26/2017         Assessment & Recommendations:     Camacho Bhagat is a 51 year old PMH of HTN , DMII, CASTAÑEDA cirrhosis SP OLT 03/2017 who was admitted with neutropenia and colitis , had Ex Lap 7/4, and was transferred out of ICU on Abx for the infection on 7/17 , transferred to ICU due to AMS and hypercarbic resp failure today 7/25. Intubated , received Paracentesis 5L removed. On albumin post procedure. Nephrology consulted for EJ and anuria    Will recommend  Hold off any more aggressive hydration since hs BP has stabilized, can bolus as needed for hypotension  He is anuric to oliguric and mildly hypervolemic   Will monitor for diuresis Vs RRT needs in the next 24hrs  Follow on BMP Q12hr for electrolytes  Avoid hypotension , maintain MAP >65    1. EJ oliguric likely sec to high intraabdominal pressures and ischemic ATN sec to sepsis from the ? Colon perf ? Shock state with hypotension, baseline creat in 1.6  Multifactorial cause of EJ --   Abdominal compartment syndrome --- post paracentesis 5L removed 7/25  SIRS/ sepsis/ shock state -- Intra-abdominal source of sepsis, on ABx per ID  Tacrolimus toxicity -- may be caused by the renal function decline as well  Will monitor renal functions  On exam today he seems mildly hypervolemic and BP have stabilized , will recommend to stop continuous IVF and give bolus IVF as needed for hypotension Vs pressers  Will monitor needs for CRRT , since he is anuric /oliguric may need RRT , no indication right now, will continue to assess       2. Lactic acidosis -- improved     3. Acid base status -- acute resp acidosis on MV  Bicarb 23      4. Electrolytes within acceptable range  Continue to monitor     5. SP liver transplant  On tacrolimus , levels were high last checked,   Meropenem, micafungin and daptomycin  ID following as well    Recommendations were communicated to primary team    Seen and discussed with Dr. Robert Barraza MD    [phone]    Interval History :   In the last 24 hours Camacho Bhagat has been off pressors and volume resucitated , no plans for surgery at this point and on broad spectrum ABx      Review of Systems:   Unable to obtain    Physical Exam:   I/O last 3 completed shifts:  In: 4056.35 [I.V.:1791.35; NG/GT:900; IV Piggyback:1000]  Out: 6159 [Urine:384; Emesis/NG output:775; Drains:5000]   /70  Pulse 90  Temp 101.3  F (38.5  C) (Axillary)  Resp 23  Ht 1.829 m (6')  Wt 106.2 kg (234 lb 2.1 oz)  SpO2 99%  BMI 31.75 kg/m2     GENERAL APPEARANCE: no acute distress  EYES:  - scleral icterus, pupils equal  HENT: mouth without ulcers or lesions  PULM: lungs basal crepts to auscultation,  bilaterally, equal air movement, no clubbing  CV: regular rhythm, normal rate, no rub     -JVP -     -edema +   GI: soft, +/-tender, mild distended, bowel sounds are -  INTEGUMENT: no cyanosis, - rash  NEURO:  sedated         Labs:   All labs reviewed by me  Electrolytes/Renal -   Recent Labs   Lab Test  07/26/17   0431  07/25/17   1651  07/25/17   0945   07/16/17   0326   NA  140  138  137   < >  148*   POTASSIUM  3.5  4.4  4.0   < >  5.1   CHLORIDE  108  106  104   < >  123*   CO2  23  24  26   < >  22   BUN  74*  79*  77*   < >  71*   CR  3.09*  2.90*  2.60*   < >  1.66*   GLC  129*  176*  382*   < >  128*   JACOB  7.9*  8.0*  7.6*   < >  7.8*   MAG  1.9  1.9   --    --   2.4*   PHOS  4.1  4.8*   --    --   3.8    < > = values in this interval not displayed.       CBC -   Recent Labs   Lab Test  07/26/17   1150  07/26/17   0522  07/26/17   0431  07/25/17   1651  07/25/17   0945   WBC   --    --   7.7  10.3  11.9*   HGB  6.7*  5.9*  5.9*  7.5*  8.8*   PLT   --    --   109*  141*  168       LFTs -   Recent Labs   Lab Test  07/25/17   1651  07/25/17   0945  07/24/17   0705   ALKPHOS  242*  317*  264*   BILITOTAL  0.6  0.5  0.4   ALT  50  60  54   AST  61*  90*  86*   PROTTOTAL  6.3*  6.6*  6.0*   ALBUMIN  2.0*  1.9*  1.7*       Iron  Panel -   Recent Labs   Lab Test  02/08/16   1144   IRON  93   IRONSAT  41         Imaging:  All imaging studies reviewed by me.     Current Medications:    ganciclovir (CYTOVENE) intermittent infusion  2.5 mg/kg (Dosing Weight) Intravenous Q12H     meropenem  1 g Intravenous Q12H     DAPTOmycin (CUBICIN) intermittent infusion  8 mg/kg (Dosing Weight) Intravenous Q24H     micafungin  100 mg Intravenous Q24H     albumin human  25 g Intravenous Q6H     fludrocortisone  100 mcg Oral BID     aspirin  80 mg Oral Daily     multivitamins with minerals  15 mL Per Feeding Tube Daily     heparin  5,000 Units Subcutaneous Q8H     pantoprazole  40 mg Oral or Feeding Tube Daily     sodium chloride (PF)  3 mL Intracatheter Q8H       propofol (DIPRIVAN) infusion 60 mcg/kg/min (07/26/17 1231)     insulin (regular) Stopped (07/25/17 2028)     NaCl 100 mL/hr at 07/26/17 1050     IV fluid REPLACEMENT ONLY 25 mL/hr at 07/20/17 0430     Balaji Barraza MD

## 2017-07-26 NOTE — PLAN OF CARE
Problem: Perioperative Period (Adult)  Goal: Signs and Symptoms of Listed Potential Problems Will be Absent or Manageable (Perioperative Period)  Signs and symptoms of listed potential problems will be absent or manageable by discharge/transition of care (reference Perioperative Period (Adult) CPG).  Outcome: No Change  See flowsheets and nurse note for details.

## 2017-07-26 NOTE — PLAN OF CARE
Problem: Sepsis (Adult)  Goal: Signs and Symptoms of Listed Potential Problems Will be Absent or Manageable (Sepsis)  Signs and symptoms of listed potential problems will be absent or manageable by discharge/transition of care (reference Sepsis (Adult) CPG).   Outcome: Declining  D/I: PRBC 2 units transfused for low hgb. Tmax 102 axillary. BP and HR stable (see flow sheets). Left side of abd warm and reddened. Pt now going to OR for ex lap. Consent signed. Pt now ready to go. Intubated and sedated on propofol 60 mcg/kg/mn  A: Pt going to OR for ex lap and possible ostomy. Continues with high fevers.   P: Send pt to OR when team arrives. Wait for pt to return post op.

## 2017-07-26 NOTE — PROGRESS NOTES
"[facility]  WO Nurse Inpatient Adult Pressure INJURY (PI) Wound Assessment     Initial assessment of PU(s) on pt's:   Sacrum and left ear lobe    Data:   Patient History:      per MD note(s): This is a 51 year old male with complex PMH of CASTAÑEDA cirrhosis s/p Liver transplant Mar 2017.  Was admitted on 2014 with abdominal pain, sepsis, CT at OSH showed \"right colonic wall thickening suggesting colitis\" underwent Ex-Lap and washout for neutropenic colitis, intra-op was noted to have inflammed cecum and ascending colon with murky fluid.  Abdomen was left open at the time and was closed on 2017.  Some concern for CMV colitis with low count CMV viremia/GI PTLD and subsequently underwent colonoscopy on :  No colitis was seen on visualized portions of mucosa.     Current mattress:  AtmosAir, RN ordering an air mattress isolibrium or pulsate mattress  Current pressure relieving devices:  Gel-e-pads, Heel lift boots, Foam dressing and Pillows    Moisture Management:  Incontinence Protocol      Current Diet / Nutrition:       None            Kwasi Assessment and sub scores:   Kwasi Score  Av.7  Min: 10  Max: 22   Labs:   Recent Labs   Lab Test  17   0522  17   0431  17   1651   17   0316   17   1810   ALBUMIN   --    --   2.0*   < >   --    < >   --    HGB  5.9*  5.9*  7.5*   < >  7.1*   < >  7.3*   INR   --    --   1.23*   < >  1.80*   < >   --    WBC   --   7.7  10.3   < >  0.8*   < >  0.8*   A1C   --    --    --    --   Canceled, Test credited   Below Assay Range  NOTIFIED LEONARD ONEILL AT 0538 ON 17 BY CHRISSY     --    --    CRP   --    --    --    --    --    --   354.0    < > = values in this interval not displayed.                                                                                                                          Pressure Injury Assessment  (location #1):   Sacrum    Wound History:   Pt was transferred from  yesterday. Had multiple " procedures throughout the day per RN.      7/26/17    Wound Base: moist dark maroon tissue    Specific Dimensions (length x width x depth, in cm) :   3.5x2.2x0.1cm    Tunneling:  N/A    Undermining: N/A    Palpation of the wound bed:  Firm on bone    Slough appearance:  none    Eschar appearance:  none    Periwound Skin: minimal superficial moist skin      Color: pink    Temperature  normal     Drainage:  Minimal serous         Odor: none    Pain:  unable to assess , intubated  Pressure Injury Assessment  (location #2):   Left ear lobe  Wound History:   Pt was transferred from  yesterday. Had multiple procedures throughout the day per RN. Noted this injury during assessing the pt for sacral pressure injury      7/26/17    Wound Base: moist dark maroon tissue    Specific Dimensions (length x width x depth, in cm) :   2.3x0.5x0.0cm    Tunneling:  N/A    Undermining: N/A    Palpation of the wound bed:  none    Slough appearance:  none    Eschar appearance:  none    Periwound Skin: intact     Color: pink    Temperature  normal     Drainage:  none         Odor: none    Pain:  unable to assess , intubated             Intervention:     Patient's chart evaluated.      Kwasi Interventions:  Current Kwasi Interventions and Care Plan reviewed and updated, appropriate at this time.    Wound was assessed.    Wound Care: was done: per POC mentioned below,    Orders  Written    Supplies  Reviewed    Discussed plan of care with Nurse           Assessment:     Pressure Injury (PI) located on Sacrum and left ear lobe: DTPI    Status: wound  initial assessment, Symptomatic, Evolving         Plan:     Nursing to notify the Provider(s) and re-consult the [facility] Nurse if wound(s) deteriorate(s).  Plan of care for wound located on Sacrum : Cleanse the area with NS and pat dry.  Apply No sting barrier to periwound skin.  Cover wound with Sacral Mepilex (#[id number])  Change dressing Q 3 days or PRN.  Turn and reposition Q 2hrs.  Ensure pt has  Adolph-cushion while sitting up in the chair.    FYI- If pt has constant incontinent loose stools needing dressing changes Q shift please discontinue the Mepilex dressing and apply criticaid barrier paste BID and PRN.  Plan of care for wound located on left ear lobe: Monitor the area closely. Place Z-flow pillow under head and reposition frequently.    WOC Nurse will return: twice a week  Face to face time: 30 mins

## 2017-07-26 NOTE — PROGRESS NOTES
"Colorectal Surgery Progress Note      Subjective:  Sedated and intubated.  Does occasionally wince and moves bilat UE spontaneously.  Receiving 1U PRBC this AM.  S/p 5L paracentesis yesterday.     Vitals:  Vitals:    07/26/17 0650 07/26/17 0700 07/26/17 0730 07/26/17 0814   BP:  137/63 126/62    BP Location:       Pulse:       Resp:  23     Temp: 99.1  F (37.3  C) 99.5  F (37.5  C)     TempSrc:       SpO2: 97% 98% 99% 99%   Weight:       Height:         I/O:  I/O last 3 completed shifts:  In: 4056.35 [I.V.:1791.35; NG/GT:900; IV Piggyback:1000]  Out: 6159 [Urine:384; Emesis/NG output:775; Drains:5000]    Physical Exam:  Gen: Sedated.    Pulm: intubated  Abd: Soft, distended, no crepitus on right flank palpated.     Midline Incision C/D/I with staples in place appears to be healing well.  Right sided healing scar.  No crepitus palpated this AM.   Ext:  Blackened first and second toe or right foot    BMP  Recent Labs  Lab 07/26/17  0431 07/25/17  1651 07/25/17  0945 07/24/17  0705    138 137 142   POTASSIUM 3.5 4.4 4.0 3.8   CHLORIDE 108 106 104 112*   CO2 23 24 26 24   BUN 74* 79* 77* 65*   CR 3.09* 2.90* 2.60* 1.88*   * 176* 382* 226*   MAG 1.9 1.9  --   --    PHOS 4.1 4.8*  --   --      CBC  Recent Labs  Lab 07/26/17  0522 07/26/17  0431 07/25/17  1651 07/25/17  0945 07/24/17  0705   WBC  --  7.7 10.3 11.9* 6.4   HGB 5.9* 5.9* 7.5* 8.8* 7.8*   HCT  --  17.6* 23.4* 27.6* 24.8*   PLT  --  109* 141* 168 126*         ASSESSMENT: This is a 51 year old male with complex PMH of CASTAÑEDA cirrhosis s/p Liver transplant Mar 2017.  Was admitted on 7/4/2014 with abdominal pain, sepsis, CT at OSH showed \"right colonic wall thickening suggesting colitis\" underwent Ex-Lap and washout for neutropenic colitis, intra-op was noted to have inflammed cecum and ascending colon with murky fluid.  Abdomen was left open at the time and was closed on 7/5/2017.  Some concern for CMV colitis with low count CMV viremia/GI PTLD and " subsequently underwent colonoscopy on 7/21:  No colitis was seen on visualized portions of mucosa.  Exam sub-optimal as colon filled with solid stool.  Random biopsies obtained.  On 7/25/2017 pt became more tachycardic, increasing O2 needs and altered, pt was intubated, hypotension responsive to IVFs, worsening kidney function.  Has not required pressors.  CT was obtained which showed a new large heterogeneous right sided retroperitoneal gas and air tracking along duodenum.  No contrast extravasation.  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      - no indication for acute surgical intervention  - recommend NPO and no Tube Feeds.  Recommend TPN.    - serial abd exams  - f/u cultures.  Paracentesis (7/25) with 86 WBC and 10% PMNs.  Culture pending.  BC 7/25 NGTD.    - recommend checking c.diff again  - on meropenem, daptomycin, micafungin, ganciclovir   - we will continue to follow  PPX:  Heparin TID      Iqra Acosta PA-C   Colon and Rectal Surgery  0933     Patient was seen and discussed with Fellow, Dr. Solano

## 2017-07-27 ENCOUNTER — APPOINTMENT (OUTPATIENT)
Dept: GENERAL RADIOLOGY | Facility: CLINIC | Age: 52
End: 2017-07-27
Attending: PHYSICIAN ASSISTANT
Payer: COMMERCIAL

## 2017-07-27 ENCOUNTER — APPOINTMENT (OUTPATIENT)
Dept: GENERAL RADIOLOGY | Facility: CLINIC | Age: 52
End: 2017-07-27
Attending: TRANSPLANT SURGERY
Payer: COMMERCIAL

## 2017-07-27 LAB
ALBUMIN SERPL-MCNC: 2.4 G/DL (ref 3.4–5)
ALP SERPL-CCNC: 143 U/L (ref 40–150)
ALT SERPL W P-5'-P-CCNC: 27 U/L (ref 0–70)
ANION GAP SERPL CALCULATED.3IONS-SCNC: 14 MMOL/L (ref 3–14)
ANION GAP SERPL CALCULATED.3IONS-SCNC: 9 MMOL/L (ref 3–14)
AST SERPL W P-5'-P-CCNC: 27 U/L (ref 0–45)
BASOPHILS # BLD AUTO: 0 10E9/L (ref 0–0.2)
BASOPHILS NFR BLD AUTO: 0.1 %
BILIRUB DIRECT SERPL-MCNC: 0.8 MG/DL (ref 0–0.2)
BILIRUB SERPL-MCNC: 1.3 MG/DL (ref 0.2–1.3)
BUN SERPL-MCNC: 72 MG/DL (ref 7–30)
BUN SERPL-MCNC: 77 MG/DL (ref 7–30)
CALCIUM SERPL-MCNC: 7.9 MG/DL (ref 8.5–10.1)
CALCIUM SERPL-MCNC: 7.9 MG/DL (ref 8.5–10.1)
CHLORIDE SERPL-SCNC: 108 MMOL/L (ref 94–109)
CHLORIDE SERPL-SCNC: 111 MMOL/L (ref 94–109)
CO2 SERPL-SCNC: 20 MMOL/L (ref 20–32)
CO2 SERPL-SCNC: 23 MMOL/L (ref 20–32)
COPATH REPORT: NORMAL
CREAT SERPL-MCNC: 2.45 MG/DL (ref 0.66–1.25)
CREAT SERPL-MCNC: 2.82 MG/DL (ref 0.66–1.25)
DIFFERENTIAL METHOD BLD: ABNORMAL
EOSINOPHIL # BLD AUTO: 0.1 10E9/L (ref 0–0.7)
EOSINOPHIL NFR BLD AUTO: 0.7 %
ERYTHROCYTE [DISTWIDTH] IN BLOOD BY AUTOMATED COUNT: 15.9 % (ref 10–15)
GFR SERPL CREATININE-BSD FRML MDRD: 24 ML/MIN/1.7M2
GFR SERPL CREATININE-BSD FRML MDRD: 28 ML/MIN/1.7M2
GLUCOSE BLDC GLUCOMTR-MCNC: 148 MG/DL (ref 70–99)
GLUCOSE BLDC GLUCOMTR-MCNC: 150 MG/DL (ref 70–99)
GLUCOSE BLDC GLUCOMTR-MCNC: 157 MG/DL (ref 70–99)
GLUCOSE BLDC GLUCOMTR-MCNC: 159 MG/DL (ref 70–99)
GLUCOSE BLDC GLUCOMTR-MCNC: 167 MG/DL (ref 70–99)
GLUCOSE BLDC GLUCOMTR-MCNC: 170 MG/DL (ref 70–99)
GLUCOSE BLDC GLUCOMTR-MCNC: 174 MG/DL (ref 70–99)
GLUCOSE BLDC GLUCOMTR-MCNC: 66 MG/DL (ref 70–99)
GLUCOSE BLDC GLUCOMTR-MCNC: 76 MG/DL (ref 70–99)
GLUCOSE BLDC GLUCOMTR-MCNC: 77 MG/DL (ref 70–99)
GLUCOSE BLDC GLUCOMTR-MCNC: 90 MG/DL (ref 70–99)
GLUCOSE SERPL-MCNC: 164 MG/DL (ref 70–99)
GLUCOSE SERPL-MCNC: 66 MG/DL (ref 70–99)
HCT VFR BLD AUTO: 27.7 % (ref 40–53)
HGB BLD-MCNC: 9.3 G/DL (ref 13.3–17.7)
IMM GRANULOCYTES # BLD: 0 10E9/L (ref 0–0.4)
IMM GRANULOCYTES NFR BLD: 0.4 %
LYMPHOCYTES # BLD AUTO: 1.6 10E9/L (ref 0.8–5.3)
LYMPHOCYTES NFR BLD AUTO: 15.6 %
MAGNESIUM SERPL-MCNC: 2 MG/DL (ref 1.6–2.3)
MAGNESIUM SERPL-MCNC: 2 MG/DL (ref 1.6–2.3)
MCH RBC QN AUTO: 28.8 PG (ref 26.5–33)
MCHC RBC AUTO-ENTMCNC: 33.6 G/DL (ref 31.5–36.5)
MCV RBC AUTO: 86 FL (ref 78–100)
MONOCYTES # BLD AUTO: 0.4 10E9/L (ref 0–1.3)
MONOCYTES NFR BLD AUTO: 4.1 %
NEUTROPHILS # BLD AUTO: 7.9 10E9/L (ref 1.6–8.3)
NEUTROPHILS NFR BLD AUTO: 79.1 %
NRBC # BLD AUTO: 0 10*3/UL
NRBC BLD AUTO-RTO: 0 /100
PHOSPHATE SERPL-MCNC: 5.8 MG/DL (ref 2.5–4.5)
PHOSPHATE SERPL-MCNC: 6.1 MG/DL (ref 2.5–4.5)
PLATELET # BLD AUTO: 144 10E9/L (ref 150–450)
POTASSIUM SERPL-SCNC: 3.4 MMOL/L (ref 3.4–5.3)
POTASSIUM SERPL-SCNC: 3.5 MMOL/L (ref 3.4–5.3)
PROT SERPL-MCNC: 5.6 G/DL (ref 6.8–8.8)
RBC # BLD AUTO: 3.23 10E12/L (ref 4.4–5.9)
SODIUM SERPL-SCNC: 142 MMOL/L (ref 133–144)
SODIUM SERPL-SCNC: 143 MMOL/L (ref 133–144)
TACROLIMUS BLD-MCNC: ABNORMAL UG/L (ref 5–15)
TME LAST DOSE: ABNORMAL H
WBC # BLD AUTO: 10 10E9/L (ref 4–11)

## 2017-07-27 PROCEDURE — 84100 ASSAY OF PHOSPHORUS: CPT | Performed by: STUDENT IN AN ORGANIZED HEALTH CARE EDUCATION/TRAINING PROGRAM

## 2017-07-27 PROCEDURE — 25000128 H RX IP 250 OP 636: Performed by: PHYSICIAN ASSISTANT

## 2017-07-27 PROCEDURE — 25000128 H RX IP 250 OP 636: Performed by: NURSE PRACTITIONER

## 2017-07-27 PROCEDURE — 80048 BASIC METABOLIC PNL TOTAL CA: CPT | Performed by: STUDENT IN AN ORGANIZED HEALTH CARE EDUCATION/TRAINING PROGRAM

## 2017-07-27 PROCEDURE — 25000128 H RX IP 250 OP 636: Performed by: STUDENT IN AN ORGANIZED HEALTH CARE EDUCATION/TRAINING PROGRAM

## 2017-07-27 PROCEDURE — 99212 OFFICE O/P EST SF 10 MIN: CPT

## 2017-07-27 PROCEDURE — 25000132 ZZH RX MED GY IP 250 OP 250 PS 637: Performed by: STUDENT IN AN ORGANIZED HEALTH CARE EDUCATION/TRAINING PROGRAM

## 2017-07-27 PROCEDURE — 25000132 ZZH RX MED GY IP 250 OP 250 PS 637: Performed by: SURGERY

## 2017-07-27 PROCEDURE — 25800025 ZZH RX 258: Performed by: SURGERY

## 2017-07-27 PROCEDURE — 25000132 ZZH RX MED GY IP 250 OP 250 PS 637: Performed by: TRANSPLANT SURGERY

## 2017-07-27 PROCEDURE — 27210913 ZZH NUTRITION PRODUCT INTERMEDIATE PACKET

## 2017-07-27 PROCEDURE — 25000128 H RX IP 250 OP 636: Performed by: TRANSPLANT SURGERY

## 2017-07-27 PROCEDURE — 83735 ASSAY OF MAGNESIUM: CPT | Performed by: STUDENT IN AN ORGANIZED HEALTH CARE EDUCATION/TRAINING PROGRAM

## 2017-07-27 PROCEDURE — 25000125 ZZHC RX 250: Performed by: STUDENT IN AN ORGANIZED HEALTH CARE EDUCATION/TRAINING PROGRAM

## 2017-07-27 PROCEDURE — 80197 ASSAY OF TACROLIMUS: CPT | Performed by: STUDENT IN AN ORGANIZED HEALTH CARE EDUCATION/TRAINING PROGRAM

## 2017-07-27 PROCEDURE — 20000004 ZZH R&B ICU UMMC

## 2017-07-27 PROCEDURE — 71010 XR CHEST PORT 1 VW: CPT

## 2017-07-27 PROCEDURE — 74000 XR ABDOMEN PORT F1 VW: CPT

## 2017-07-27 PROCEDURE — 27210432 ZZH NUTRITION PRODUCT RENAL BASIC LITER

## 2017-07-27 PROCEDURE — 85025 COMPLETE CBC W/AUTO DIFF WBC: CPT | Performed by: STUDENT IN AN ORGANIZED HEALTH CARE EDUCATION/TRAINING PROGRAM

## 2017-07-27 PROCEDURE — 40000275 ZZH STATISTIC RCP TIME EA 10 MIN

## 2017-07-27 PROCEDURE — 00000146 ZZHCL STATISTIC GLUCOSE BY METER IP

## 2017-07-27 PROCEDURE — 94003 VENT MGMT INPAT SUBQ DAY: CPT

## 2017-07-27 PROCEDURE — 25000125 ZZHC RX 250: Performed by: TRANSPLANT SURGERY

## 2017-07-27 PROCEDURE — 99291 CRITICAL CARE FIRST HOUR: CPT | Mod: GC | Performed by: SURGERY

## 2017-07-27 PROCEDURE — 40000196 ZZH STATISTIC RAPCV CVP MONITORING

## 2017-07-27 PROCEDURE — 25000128 H RX IP 250 OP 636: Performed by: SURGERY

## 2017-07-27 PROCEDURE — 80076 HEPATIC FUNCTION PANEL: CPT | Performed by: STUDENT IN AN ORGANIZED HEALTH CARE EDUCATION/TRAINING PROGRAM

## 2017-07-27 PROCEDURE — P9047 ALBUMIN (HUMAN), 25%, 50ML: HCPCS | Performed by: SURGERY

## 2017-07-27 PROCEDURE — 25000125 ZZHC RX 250: Performed by: SURGERY

## 2017-07-27 PROCEDURE — 25000132 ZZH RX MED GY IP 250 OP 250 PS 637: Performed by: PHYSICIAN ASSISTANT

## 2017-07-27 RX ORDER — AMINO ACIDS/PROTEIN HYDROLYS 11G-40/45
1 LIQUID IN PACKET (ML) ORAL 3 TIMES DAILY
Status: DISCONTINUED | OUTPATIENT
Start: 2017-07-27 | End: 2017-08-22

## 2017-07-27 RX ADMIN — Medication 25 ML: at 21:18

## 2017-07-27 RX ADMIN — ALBUMIN (HUMAN) 25 G: 12.5 SOLUTION INTRAVENOUS at 00:41

## 2017-07-27 RX ADMIN — PANTOPRAZOLE SODIUM 40 MG: 40 TABLET, DELAYED RELEASE ORAL at 08:31

## 2017-07-27 RX ADMIN — FLUDROCORTISONE ACETATE 100 MCG: 0.1 TABLET ORAL at 08:31

## 2017-07-27 RX ADMIN — ALBUMIN (HUMAN) 25 G: 12.5 SOLUTION INTRAVENOUS at 05:00

## 2017-07-27 RX ADMIN — Medication 1 PACKET: at 16:55

## 2017-07-27 RX ADMIN — HEPARIN SODIUM 5000 UNITS: 5000 INJECTION, SOLUTION INTRAVENOUS; SUBCUTANEOUS at 16:50

## 2017-07-27 RX ADMIN — GANCICLOVIR SODIUM 250 MG: 500 INJECTION, POWDER, LYOPHILIZED, FOR SOLUTION INTRAVENOUS at 05:01

## 2017-07-27 RX ADMIN — DAPTOMYCIN 750 MG: 500 INJECTION, POWDER, LYOPHILIZED, FOR SOLUTION INTRAVENOUS at 17:03

## 2017-07-27 RX ADMIN — ACETAMINOPHEN 650 MG: 325 TABLET, FILM COATED ORAL at 17:12

## 2017-07-27 RX ADMIN — ALBUMIN (HUMAN) 25 G: 12.5 SOLUTION INTRAVENOUS at 18:07

## 2017-07-27 RX ADMIN — HYDROMORPHONE HYDROCHLORIDE 0.5 MG: 1 INJECTION, SOLUTION INTRAMUSCULAR; INTRAVENOUS; SUBCUTANEOUS at 17:02

## 2017-07-27 RX ADMIN — HEPARIN SODIUM 5000 UNITS: 5000 INJECTION, SOLUTION INTRAVENOUS; SUBCUTANEOUS at 08:31

## 2017-07-27 RX ADMIN — HUMAN INSULIN 2 UNITS/HR: 100 INJECTION, SOLUTION SUBCUTANEOUS at 13:21

## 2017-07-27 RX ADMIN — MICAFUNGIN SODIUM 100 MG: 10 INJECTION, POWDER, LYOPHILIZED, FOR SOLUTION INTRAVENOUS at 13:16

## 2017-07-27 RX ADMIN — PROPOFOL 60 MCG/KG/MIN: 10 INJECTION, EMULSION INTRAVENOUS at 06:08

## 2017-07-27 RX ADMIN — PROPOFOL 60 MCG/KG/MIN: 10 INJECTION, EMULSION INTRAVENOUS at 00:34

## 2017-07-27 RX ADMIN — MEROPENEM 1 G: 1 INJECTION, POWDER, FOR SOLUTION INTRAVENOUS at 14:46

## 2017-07-27 RX ADMIN — HEPARIN SODIUM 5000 UNITS: 5000 INJECTION, SOLUTION INTRAVENOUS; SUBCUTANEOUS at 01:02

## 2017-07-27 RX ADMIN — MULTIVIT AND MINERALS-FERROUS GLUCONATE 9 MG IRON/15 ML ORAL LIQUID 15 ML: at 08:31

## 2017-07-27 RX ADMIN — FENTANYL CITRATE 50 MCG/HR: 50 INJECTION, SOLUTION INTRAMUSCULAR; INTRAVENOUS at 00:28

## 2017-07-27 RX ADMIN — Medication 1 PACKET: at 21:04

## 2017-07-27 RX ADMIN — Medication 80 MG: at 08:31

## 2017-07-27 RX ADMIN — SODIUM CHLORIDE, POTASSIUM CHLORIDE, SODIUM LACTATE AND CALCIUM CHLORIDE: 600; 310; 30; 20 INJECTION, SOLUTION INTRAVENOUS at 00:29

## 2017-07-27 RX ADMIN — ALBUMIN (HUMAN) 25 G: 12.5 SOLUTION INTRAVENOUS at 12:17

## 2017-07-27 RX ADMIN — POTASSIUM CHLORIDE 20 MEQ: 29.8 INJECTION, SOLUTION INTRAVENOUS at 04:59

## 2017-07-27 RX ADMIN — FENTANYL CITRATE 50 MCG/HR: 50 INJECTION, SOLUTION INTRAMUSCULAR; INTRAVENOUS at 14:37

## 2017-07-27 RX ADMIN — SODIUM CHLORIDE, POTASSIUM CHLORIDE, SODIUM LACTATE AND CALCIUM CHLORIDE: 600; 310; 30; 20 INJECTION, SOLUTION INTRAVENOUS at 14:53

## 2017-07-27 RX ADMIN — GANCICLOVIR SODIUM 250 MG: 500 INJECTION, POWDER, LYOPHILIZED, FOR SOLUTION INTRAVENOUS at 18:05

## 2017-07-27 RX ADMIN — PROPOFOL 60 MCG/KG/MIN: 10 INJECTION, EMULSION INTRAVENOUS at 03:06

## 2017-07-27 RX ADMIN — MEROPENEM 1 G: 1 INJECTION, POWDER, FOR SOLUTION INTRAVENOUS at 01:02

## 2017-07-27 RX ADMIN — HYDROMORPHONE HYDROCHLORIDE 0.5 MG: 1 INJECTION, SOLUTION INTRAMUSCULAR; INTRAVENOUS; SUBCUTANEOUS at 21:04

## 2017-07-27 NOTE — PROGRESS NOTES
SUBJECTIVE:  Patient returned from surgery in stable condition. He is s/p an exploratory laparotomy with right angelique-colectomy and end ileostomy. 4.5 to 5 L of ascites were removed during the case. He received 2 units of PRBCs, 1 FFP and 600 of crystalloid during the procedure.     OBJECTIVE:  Temp:  [98.8  F (37.1  C)-102  F (38.9  C)] 102  F (38.9  C)  Pulse:  [90] 90  Heart Rate:  [83-98] 89  Resp:  [19-28] 24  BP: (101-150)/(52-79) 150/76  FiO2 (%):  [40 %] 40 %  SpO2:  [95 %-100 %] 98 %  - Gen: adult male Intubated and sedated  - HEENT: MMM, ETT in place, OG with bilious output   - CVS: NR/RR, no murmurs noted.  - Pulm: CTA on right, ventilated. Wheezes and course breath sounds on the left.  - Abd: soft, non-tender, distended, no guarding. New midline incision with clean dressing over top. TABITHA draining serosanguinous fluid.    - MSK/Neuro:  - Ext: warm, well-perfused; peripheral edema 1+, distal pulses b/l. Dusky tips on the right index and right large toe   - Incision: C/D/I without surrounding erythema, drainage, or fluctuance; staples in place for midline incision.     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 Readmitted to ICU 7/26 due to respiratory failure and reintubated. CT scan showing retroperitoneal air concerning for perforation. POD 0 s/p right angelique-colectomy with end ileostomy.     Plan:  - Recheck CBC, BMP, VBG, Lactic acid and follow plan as outlined below.     Neuro/ pain/ sedation:  - Monitor neurological status. Notify the MD for any acute changes in exam.  - Propofol gtt  - Started fentanyl gtt at   - PRN Dilaudid     CV:   #Sinus tachycardia  - Continuous cardiac monitoring.   #History of Hypertension  - Hold PTA anti-hypertensives      Pulm:   #Acute respiratory failure  #Bilateral  pleural effusions, pulmonary edema  - Ventilated at 16/500/8/40  - P/S trials BID  - PRN Duo-nebs      FEN/ GI:   #Retroperitoneal air, concern for perforation  -Colorectal preformed above operation. No perforation found.   #Need for nutritional supplementation  - ICU electrolyte replacement protocol  - NJ in place  - TF at goal      Graft Function/Immunosuppression:  #s/p Liver transplant  - Unremarkable liver ultrasound, last 7/5 was normal  #Need for immunosuppression  - CellCept 250 mg BID: Held since 6/27/17 due to neutropenia..   -  Prograf held per transplant due to EJ  - Fludricortisone 100 mcg BID      Renal/fluids:  - Vazquez catheter to stay in place for strict intake and output monitoring  - MIVF @100ml/hr  # EJ  # Intraabdominal hypertension with concern for compartment syndome  - Low urine output, vazquez to remain for strict I&O measurement (decreased from 22 to 11 cm water after large volume paracentesis)   - No indications for dialysis at this time, likely tomorrow  - Nephrology consulted  - Monitor electrolytes      Endocrine:   #Diabetes mellitus T2  - Insulin gtt      ID/ Abx:  #Severe Sepsis, concern for abdominal source  #Fever  - Meropenem, Daptomycin, Micafungin IV per IC   - transplant ID following, appreciates recs  - EBV PCR every other week.   #CMV viremia, +Donor/-recipient transplant  - CMV D+R-, low viral count. MMF held.   - Increase IV Ganciclovir 7/25/2017 per ID (started 07/20).   - Repeat CMV PCR 7/27/2017.       Heme: Hemoglobin stable. Will recheck in AM or earlier if there is clinical evidence of active bleeding.  - Daily CBC  # Right brachial arterial clot from art line  - Daily ASA  # Distal extremity ischemia secondary to pressors  - Monitor for signs of infection       Prophylaxis:    - DVT: SCDs, SubQ Heparin  - GI: Protonix       MSK:  - PT/OT      Lines/Drains:  -PIV x3, Vazquez, CVC, TABITHA drain, Ileostomy, NG      Disposition: Surgical ICU    Scott Luke, MS4

## 2017-07-27 NOTE — OP NOTE
[facility] Brief Operative Note    Pre-operative diagnosis: Colon Perforation   Post-operative diagnosis Inflammation  around right colon, no neida perfortation   Procedure: Procedure(s):  Exploratory Laparotomy, Right angelique-colectomy, end ileostomy, mucosal fistula, partial omentectomy - Wound Class: II-Clean Contaminated   Surgeon: Vinicio Quiroz MD   Assistants(s): Lawson Floyd MD, Karla Kraus MD   Estimated blood loss: 200 cc   Specimens: Right colon   Findings:        IVF: Crystalloids 600 cc  2 units PRBCS, 1 FFP  UOP: 250 cc    Drain: LUQ TABITHA  Complications: None                            Inflammation around the right colon, no neida perforation

## 2017-07-27 NOTE — PLAN OF CARE
Problem: Sepsis (Adult)  Goal: Signs and Symptoms of Listed Potential Problems Will be Absent or Manageable (Sepsis)  Signs and symptoms of listed potential problems will be absent or manageable by discharge/transition of care (reference Sepsis (Adult) CPG).   Outcome: Improving  D/I: Pt tmax 103.5, HR tachycardia, BP stable (see VS flow sheets). Tylenol, ice packs and fan to cool pt. Pressure supported for 2 hours. Lots of thick secretions, oral and ETT. Frequent suctioning. Propofol off. Pt follows simple commands. Fentanyl decreased to 50mcg/hr. Ostomy pink, output bile colored liquid. UO improved today (see I&O). BM x2 today. Wrist restraints in place to keep pt from pulling out tubes.   A: Pt continues to spike fevers. Large amounts of secretions while on PS. UO improving.   P: Continue to monitor and update MD with changes.

## 2017-07-27 NOTE — PROGRESS NOTES
Nephrology Progress Note  07/27/2017         Assessment & Recommendations:     Camacho Bhagat is a 51 year old PMH of HTN , DMII, CASTAÑEDA cirrhosis SP OLT 03/2017 who was admitted with neutropenia and colitis , had Ex Lap 7/4, and was transferred out of ICU on Abx for the infection on 7/17 , transferred to ICU due to AMS and hypercarbic resp failure today 7/25. Intubated , received Paracentesis 5L removed 7/25. Nephrology consulted for EJ and anuria. Had ex lap and rt hemicolectomy with ileostomy and 4-5L ascitic fluid removed as well 7/26    Will recommend  Hold off any more aggressive hydration CVP is 8, and discontinue 100ml/hr NSS can bolus as needed for hypotension  Noted albumin 100gm (25x4)  He is anuric to oliguric   Volume status is euvolemic  Will continue to monitor for diuresis Vs RRT needs   Follow on BMP Q12hr for electrolytes  Avoid hypotension , maintain MAP >65  Will also recommend to monitor Intra-abdominal pressures periodically meenu in back ground of repeated surgery and ascities    1. EJ oliguric likely sec to high intraabdominal pressures and ischemic ATN sec to sepsis from the Colon perf with Shock state with hypotension,   baseline creat in 1.6  Creatinine increased from yesterday but has plateau  Tacrolimus toxicity -- may be caused by the renal function decline as well  Will monitor renal functions for renal recovery  will recommend to stop continuous IVF and give bolus IVF as needed for hypotension Vs pressers  Will monitor needs for CRRT , since he is anuric /oliguric may need RRT , no indication right now     2. Lactic acidosis -- improved     3. Acid base status -- acute resp acidosis on MV  Bicarb 20     4. Electrolytes within acceptable range  Except hyperphosphatemia likely from EJ     5. SP liver transplant  On tacrolimus , levels were high last checked,     6. Intra-abdominal source of infection with sepsis, SP hemicolectomy and ileostomy 7/26  Meropenem, micafungin and daptomycin  -- CPK 40 on 7/25  ID following as well    Recommendations were communicated to primary team    Seen and discussed with Dr. Robert Barraza MD   [phone]    Interval History :   In the last 24 hours Camacho Bhagat has been off pressors , on 100ml/hr of IVF when examined. Post op from his ex lap. Continues to on MV      Review of Systems:   Unable to obtain    Physical Exam:   I/O last 3 completed shifts:  In: 5078.05 [I.V.:3137.05; NG/GT:120]  Out: 2410 [Urine:1025; Drains:635; Other:200; Stool:350; Blood:200]   /72  Pulse 90  Temp 101.8  F (38.8  C) (Axillary)  Resp 24  Ht 1.829 m (6')  Wt 99 kg (218 lb 4.1 oz)  SpO2 100%  BMI 29.6 kg/m2     GENERAL APPEARANCE: no acute distress, on MV  EYES:  - scleral icterus, pupils equal  HENT: mouth without ulcers or lesions  PULM: lungs basal crepts to auscultation,  bilaterally, equal air movement, no clubbing  CV: regular rhythm, normal rate, no rub     -JVP -     -edema -  GI: soft, +/-tender, mild distended, bowel sounds are -  INTEGUMENT: no cyanosis, - rash  NEURO:  sedated         Labs:   All labs reviewed by me  Electrolytes/Renal -   Recent Labs   Lab Test  07/27/17   0410  07/26/17   2243  07/26/17   2133   07/26/17   0431  07/25/17   1651   NA  142  140  139   < >  140  138   POTASSIUM  3.5  3.5  3.5   < >  3.5  4.4   CHLORIDE  108  108   --    --   108  106   CO2  20  21   --    --   23  24   BUN  72*  75*   --    --   74*  79*   CR  2.82*  2.76*   --    --   3.09*  2.90*   GLC  164*  151*  155*   < >  129*  176*   JACOB  7.9*  7.6*   --    --   7.9*  8.0*   MAG  2.0   --    --    --   1.9  1.9   PHOS  5.8*   --    --    --   4.1  4.8*    < > = values in this interval not displayed.       CBC -   Recent Labs   Lab Test  07/27/17   0410  07/26/17   2243  07/26/17   2133   07/26/17 2017 07/26/17   0431   WBC  10.0  10.3   --    --    --    --   7.7   HGB  9.3*  9.1*  9.5*   < >  9.8*  Canceled, Test credited   Incorrect specimen type     < >   5.9*   PLT  144*  136*   --    --   138*   --   109*    < > = values in this interval not displayed.       LFTs -   Recent Labs   Lab Test  07/27/17   0410  07/25/17   1651  07/25/17   0945   ALKPHOS  143  242*  317*   BILITOTAL  1.3  0.6  0.5   ALT  27  50  60   AST  27  61*  90*   PROTTOTAL  5.6*  6.3*  6.6*   ALBUMIN  2.4*  2.0*  1.9*       Iron Panel -   Recent Labs   Lab Test  02/08/16   1144   IRON  93   IRONSAT  41         Imaging:  All imaging studies reviewed by me.     Current Medications:    ganciclovir (CYTOVENE) intermittent infusion  2.5 mg/kg (Dosing Weight) Intravenous Q12H     meropenem  1 g Intravenous Q12H     DAPTOmycin (CUBICIN) intermittent infusion  8 mg/kg (Dosing Weight) Intravenous Q24H     micafungin  100 mg Intravenous Q24H     albumin human  25 g Intravenous Q6H     aspirin  80 mg Oral Daily     multivitamins with minerals  15 mL Per Feeding Tube Daily     heparin  5,000 Units Subcutaneous Q8H     pantoprazole  40 mg Oral or Feeding Tube Daily     sodium chloride (PF)  3 mL Intracatheter Q8H       lactated ringers 100 mL/hr at 07/27/17 0029     fentaNYL 100 mcg/hr (07/27/17 1200)     insulin (regular) Stopped (07/25/17 2028)     IV fluid REPLACEMENT ONLY 25 mL/hr at 07/20/17 0430     Balaji Barraza MD

## 2017-07-27 NOTE — PLAN OF CARE
Problem: Goal Outcome Summary  Goal: Goal Outcome Summary  Outcome: Therapy, progress toward functional goals is gradual  Status  D/I: Patient on unit 4A Surgical/Neuro ICU, POD 0 s/p  R hemicolectomy with end ileostoym   Neuro- Sedated, RASS -3, gives thumbs up and moves toes at times when addressed. Pupil 2-3mm.  CV- SR/ST 90s-low 100s. SBP 99-120s MAP >60. SBP decreases with peritoneal fluid loss. SBP recovers with scheduled albumin and MIVFs  Pulm- LS diminished. Sats %. After the procedure pt's RR increased to sudm46m low 30s. After pain medications adjustments pt RR decreased to 20-23. CMV, Tv 500, RR 18, PEP 5  GI- BS hypoactive, RUQ TABITHA dumping serosanguinous fluid 100-200 hr. Red hines inside ileostomy bag draining greed thick fluid  Q 2 hrs.  - Low UOP 50-75 hr, urine is hardik and sedimented  Skin-RUQ J, Midline ABD incision marked, Ileostomy and red hines on LLQ  Gtts- Propofol 60 mcg/kg/hr. Fentanyl 100 mcg/hr  Pain- Fentanyl titrated for pain control  Electrolytes- K replaced   See flow sheets for further interventions and assessments.  P: Continue to monitor pt closely, Notify MD of changes/concerns.

## 2017-07-27 NOTE — PROGRESS NOTES
"[facility]  WO Nurse Inpatient Adult Pressure INJURY (PI) Wound Assessment     Initial assessment of PU(s) on pt's:  Ileostomy   Follow up assessment of PI on- left ear lobe  Sacrum (assessed yesterday)      Data:   Patient History:      per MD note(s): This is a 51 year old male with complex PMH of CASTAÑEDA cirrhosis s/p Liver transplant Mar 2017.  Was admitted on 2014 with abdominal pain, sepsis, CT at OSH showed \"right colonic wall thickening suggesting colitis\" underwent Ex-Lap and washout for neutropenic colitis, intra-op was noted to have inflammed cecum and ascending colon with murky fluid.  Abdomen was left open at the time and was closed on 2017.  Some concern for CMV colitis with low count CMV viremia/GI PTLD and subsequently underwent colonoscopy on :  No colitis was seen on visualized portions of mucosa.     Current mattress:  AtmosAir, RN ordering an air mattress isolibrium or pulsate mattress  Current pressure relieving devices:  Z-flow, Heel lift boots, Foam dressing and Pillows    Moisture Management:  Incontinence Protocol      Current Diet / Nutrition:       Active Diet Order      NPO for Medical/Clinical Reasons Except for: Meds, Ice Chips        Kwasi Assessment and sub scores:   Kwasi Score  Av.7  Min: 10  Max: 22   Labs:   Recent Labs   Lab Test  17   0522  17   0431  17   1651   17   0316   17   1810   ALBUMIN   --    --   2.0*   < >   --    < >   --    HGB  5.9*  5.9*  7.5*   < >  7.1*   < >  7.3*   INR   --    --   1.23*   < >  1.80*   < >   --    WBC   --   7.7  10.3   < >  0.8*   < >  0.8*   A1C   --    --    --    --   Canceled, Test credited   Below Assay Range  NOTIFIED LEONARD ONEILL AT 0538 ON 17 BY CHRISSY     --    --    CRP   --    --    --    --    --    --   354.0    < > = values in this interval not displayed.                                                                                                                  "         Pressure Injury Assessment  (location #1):   Sacrum (assessed yesterday)   Wound History:   Pt was transferred from  yesterday. Had multiple procedures throughout the day per RN.      7/26/17    Wound Base: moist dark maroon tissue    Specific Dimensions (length x width x depth, in cm) :   3.5x2.2x0.1cm    Tunneling:  N/A    Undermining: N/A    Palpation of the wound bed:  Firm on bone    Slough appearance:  none    Eschar appearance:  none    Periwound Skin: minimal superficial moist skin      Color: pink    Temperature  normal     Drainage:  Minimal serous         Odor: none    Pain:  unable to assess , intubated  Pressure Injury Assessment  (location #2):   Left ear lobe  Wound History:   Pt was transferred from  yesterday. Had multiple procedures throughout the day per RN. Noted this injury during assessing the pt for sacral pressure injury      7/26/17    Wound Base: moist dark maroon tissue (continues to be purple)    Specific Dimensions (length x width x depth, in cm) :   2.3x0.5x0.0cm    Tunneling:  N/A    Undermining: N/A    Palpation of the wound bed:  none    Slough appearance:  none    Eschar appearance:  none    Periwound Skin: intact     Color: pink    Temperature  normal     Drainage:  none         Odor: none    Pain:  unable to assess , intubated             Intervention:     Patient's chart evaluated.      Kwasi Interventions:  Current Kwasi Interventions and Care Plan reviewed and updated, appropriate at this time.    Wound was assessed.    Wound Care: was done: per POC mentioned below,    Orders  Written    Supplies  Reviewed    Discussed plan of care with Nurse           Assessment:     Pressure Injury (PI) located on Sacrum and left ear lobe: DTPI    Status: wound  initial assessment, Symptomatic, Evolving    New ileostomy and Mucus fistula         Plan:     Nursing to notify the Provider(s) and re-consult the [facility] Nurse if wound(s) deteriorate(s).  Plan of care for wound located on  "Sacrum : Cleanse the area with NS and pat dry.  Apply No sting barrier to periwound skin.  Cover wound with Sacral Mepilex (#[id number])  Change dressing Q 3 days or PRN.  Turn and reposition Q 2hrs.  Ensure pt has Adolph-cushion while sitting up in the chair.    FYI- If pt has constant incontinent loose stools needing dressing changes Q shift please discontinue the Mepilex dressing and apply criticaid barrier paste BID and PRN.  Plan of care for wound located on left ear lobe: Monitor the area closely. Cover with a piece of Mepilex. Place Z-flow pillow under head and reposition frequently.    WOC Nurse will return: twice a week  Face to face time: 30 mins  [facility]  WO Nurse Inpatient Ostomy Assessment      Initial  Assessment of ostomy and needs for:  Ileostomy and Mucus fistula      Data:   History:    This is a 51 year old male with complex PMH of CASTAÑEDA cirrhosis s/p Liver transplant Mar 2017.  Was admitted on 7/4/2014 with abdominal pain, sepsis, CT at OSH showed \"right colonic wall thickening suggesting colitis\" underwent Ex-Lap and washout for neutropenic colitis, intra-op was noted to have inflammed cecum and ascending colon with murky fluid.  Abdomen was left open at the time and was closed on 7/5/2017.  Some concern for CMV colitis with low count CMV viremia/GI PTLD and subsequently underwent colonoscopy on 7/21:  No colitis was seen on visualized portions of mucosa.  Exam sub-optimal as colon filled with solid stool.  Random biopsies obtained.  On 7/25/2017 pt became more tachycardic, increasing O2 needs and altered, pt was intubated, hypotension responsive to IVFs, worsening kidney function.  Has not required pressors.  CT was obtained which showed a new large heterogeneous right sided retroperitoneal gas and air tracking along duodenum.  No contrast extravasation.  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  Despite conservative management with bowel rest and IV abx, pt " "continued to spike fevers.  Now s/p Exploratory laparotomy, right angelique-colectomy, end ileostomy, mucus fistula, partial omentectomy on 7/26.    Type of ostomy:  Ileostomy and Mucus fistula  Stoma assessment:   ? stoma:  1 3/8\" ,  viable, pink, pale, round, moist, edematous and protruberant  ? A bridge in place?: No, 1 RRC in place  ? Stent(s) in place?: Not applicable,   ? Catheter secured? Yes:   Mucocutaneous Junction (MCJ):  intact  Peristomal skin:  intact  Abdominal assessment: soft and distended  ? N/G still in place?:  Yes   Output:  small, watery, green,   I/O last 3 completed shifts:  In: 5078.05 [I.V.:3137.05; NG/GT:120]  Out: 2410 [Urine:1025; Drains:635; Other:200; Stool:350; Blood:200]  Current pouching system:  [place],  two piece, cut to fit and flat from surgery with minimal melting around the cutting edges.   Pain:  Unable to assess, intubated   ? Is pt still on a PCA? No    Diet:             Active Diet Order      NPO for Medical/Clinical Reasons Except for: Meds, Ice Chips      Labs:   Recent Labs   Lab Test  07/27/17   0410  07/26/17   2243   07/06/17   0316   07/05/17   1810   ALBUMIN  2.4*   --    < >   --    < >   --    HGB  9.3*  9.1*   < >  7.1*   < >  7.3*   INR   --   1.31*   < >  1.80*   < >   --    WBC  10.0  10.3   < >  0.8*   < >  0.8*   A1C   --    --    --   Canceled, Test credited   Below Assay Range  NOTIFIED LEONARD ONEILL AT 0538 ON 7/6/17 BY EAANTOLIN     --    --    CRP   --    --    --    --    --   354.0    < > = values in this interval not displayed.           Intervention:     Patient's chart evaluated.      Assessments done today:  Stoma, peristomal skin, and learning needs  ? Participants: None.  ?  Educational intervention: method: Replaced pouch using below mentioned supplies as epidermis was exposed from 3-8O'clock  Prepared for discharge by: No discharge preparations started,          Assessment:     Stoma assessment: viable and healthy    Complications: None    Learning " needs/ comprehension: He is intubated and unable to participate in the teaching. Will plan on initiating teaching when able.        Is pt able to demonstrate: 1. how to empty their pouch?  No:   2. How to read and write down correct I and O's?   No:     Effectiveness of current pouching/ supply plan: TBD         Plan:     Plan:   ? Current pouching system: Kyara 57 mm  2 pc flat kyara high output pouch with barrier ring    Education:  ? Educational needs: Use of tape, How to empty pouch, Removal of pouch, Preparation of new pouch, Cutting out or evaluating a pattern, Applying paste or rings, Applying appliance to abdomen, Peristomal skin care, Diet and hydration / fluid balance, Odor / flatus management and Lifestyle Adjustments    ? Continue to prepare for discharge:  Supplies ordered, Completed supply list, Registered for samples from , Prescriptions or note left on chart for MD to sign/complete, Prepared for discharge to home with homecare, Discussed making a WOC Nurse outpatient appointment upon discharge, Discussed when to follow up with a WOC Nurse in the future and Discussed how/ where to order supplies   ? Do WOC Nurse recommend home care?  TBD  Plan for ileostomy and Mucus fistula: RN to change pouch twice a week. WOC will initiate teaching once pt is stable.  Supplies Needed  Kyara (2pc 57mm)  Wafer- (#[id number])  Pouch- (#[id number])  or  High output pouch - (#[id number]) depending upon output  Barrier ring- (#[id number])     Ileostomy care- Change pouch twice a week  1. Gather all supplies and remove old pouch gently.  2. Cleanse peristomal skin with w arm water and soft wash cloth or gauze and dry thoroughly.  3. Cut the wafer to fit to stoma or use given pattern, apply barrier ring around the cutting surface and apply centering the stoma.  4. Attach the pouch   5. Massage around it for better adherence.     Face to face time: 30 mins including left ear assessment

## 2017-07-27 NOTE — PROGRESS NOTES
"Colorectal Surgery Progress Note      Subjective:  Intubated and sedated    Vitals:  Vitals:    07/27/17 0300 07/27/17 0400 07/27/17 0500 07/27/17 0600   BP: 120/79 115/73 104/61 111/63   BP Location:  Left arm     Pulse:       Resp: 20 22 23 22   Temp:  100.4  F (38  C)     TempSrc:  Axillary     SpO2: 100% 100% 100% 100%   Weight:  99 kg (218 lb 4.1 oz)     Height:         I/O:  I/O last 3 completed shifts:  In: 5078.05 [I.V.:3137.05; NG/GT:120]  Out: 2410 [Urine:1025; Drains:635; Other:200; Stool:350; Blood:200]    Physical Exam:  Gen: sedated  Pulm: intubated  Abd: Soft, distended.    Incision dressed   Ileostomy - pink, viable, red rubber via stoma, small bilious outout   Mucus fistula pink, viable   TABITHA drain serosang output  Ext:      BMP  Recent Labs  Lab 07/27/17 0410 07/26/17 2243 07/26/17 2133 07/26/17 2046 07/26/17 0431 07/25/17  1651    140 139 139  < > 140 138   POTASSIUM 3.5 3.5 3.5 3.5  < > 3.5 4.4   CHLORIDE 108 108  --   --   --  108 106   CO2 20 21  --   --   --  23 24   BUN 72* 75*  --   --   --  74* 79*   CR 2.82* 2.76*  --   --   --  3.09* 2.90*   * 151* 155* 151*  < > 129* 176*   MAG 2.0  --   --   --   --  1.9 1.9   PHOS 5.8*  --   --   --   --  4.1 4.8*   < > = values in this interval not displayed.  CBC  Recent Labs  Lab 07/27/17 0410 07/26/17 2243 07/26/17 2133 07/26/17 2046 07/26/17 2017 07/26/17 0431 07/25/17  1651   WBC 10.0 10.3  --   --   --   --  7.7 10.3   HGB 9.3* 9.1* 9.5* 9.6* 9.8*  Canceled, Test credited Incorrect specimen type  < > 5.9* 7.5*   HCT 27.7* 27.5*  --   --   --   --  17.6* 23.4*   * 136*  --   --  138*  --  109* 141*   < > = values in this interval not displayed.      ASSESSMENT: This is a 51 year old male with complex PMH of CASTAÑEDA cirrhosis s/p Liver transplant Mar 2017.  Was admitted on 7/4/2014 with abdominal pain, sepsis, CT at OSH showed \"right colonic wall thickening suggesting colitis\" underwent Ex-Lap and washout for " neutropenic colitis, intra-op was noted to have inflammed cecum and ascending colon with murky fluid.  Abdomen was left open at the time and was closed on 7/5/2017.  Some concern for CMV colitis with low count CMV viremia/GI PTLD and subsequently underwent colonoscopy on 7/21:  No colitis was seen on visualized portions of mucosa.  Exam sub-optimal as colon filled with solid stool.  Random biopsies obtained.  On 7/25/2017 pt became more tachycardic, increasing O2 needs and altered, pt was intubated, hypotension responsive to IVFs, worsening kidney function.  Has not required pressors.  CT was obtained which showed a new large heterogeneous right sided retroperitoneal gas and air tracking along duodenum.  No contrast extravasation.  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  Despite conservative management with bowel rest and IV abx, pt continued to spike fevers.  Now s/p Exploratory laparotomy, right angelique-colectomy, end ileostomy, mucus fistula, partial omentectomy on 7/26.    - defer management to SICU and transplant  - WOCN for new ileostomy and mucus fistula  - OK for DVT ppx from colorectal perspective    Iqra Acosta PA-C   Colon and Rectal Surgery  [id number]     Patient was seen and discussed with Fellow, Dr. Solano

## 2017-07-27 NOTE — ANESTHESIA PREPROCEDURE EVALUATION
Anesthesia Evaluation     . Pt has had prior anesthetic.     No history of anesthetic complications          ROS/MED HX    ENT/Pulmonary:     (+)sleep apnea, , . Other pulmonary disease acute respiratory failure - intubated/sedated in ICU..    Neurologic:       Cardiovascular:     (+) hypertension----. : . . . :. .       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 Readmitted to ICU 7/26 due to respiratory failure and reintubated. CT scan showing retroperitoneal air concerning for perforation.      Renal/Genitourinary:     (+) chronic renal disease, type: ARF, Pt does not require dialysis, Pt has no history of transplant,       Endo:     (+) type II DM .      Psychiatric:         Infectious Disease:   (+) Recent Fever (CMV viremia, +Donor/-recipient transplant - concern for abdominal sepsis.),       Malignancy:         Other:                     Physical Exam      Airway   Comment: Intubated and sedated    Dental     Cardiovascular   Rhythm and rate: regular and abnormal      Pulmonary (+) decreased breath sounds                       Anesthesia Plan      History & Physical Review  History and physical reviewed and following examination; no interval change.    ASA Status:  4 emergent.    NPO Status:  > 8 hours    Plan for General with Inhalation induction. Maintenance will be Balanced.    PONV prophylaxis:  Ondansetron (or other 5HT-3)  Additional equipment: 2nd IV      Postoperative Care  Postoperative pain management:  IV analgesics.      Consents  Anesthetic plan, risks, benefits and alternatives discussed with:  Implied consent/emergency..                          .

## 2017-07-27 NOTE — PROGRESS NOTES
Diabetes service brief note:    We were following prior to transfer to SICU.  Since pt remains critically ill and has appropriate orders for IV insulin infusion, we will sign off.  Asked transplant to alert us when pt returns to floor and is ready for subcutaneous insulin.  Will revisit at that time.  Diabetes Management Team job code: [id number]

## 2017-07-27 NOTE — PROGRESS NOTES
Nutrition Progress Note - Discussion of POC on SICU rounds; f/u for progress towards previous nutrition POC (see previous 7/25 reassessment for details)    -GI: note pt returned to OR yesterday and S/p XL, Right angelique-colectomy, end ileostomy, mucosal fistula, partial omentectomy (removed inflamed colon).  Noted pt already with 425 ml out ileostomy today.  -FEN/Renal: K+ 3.5, Phos 5.8 mg/dL (today); BUN 72, Cr 2.8 mg/dL (today) but would avoid PRO restriction given now postop status and recent hx of transplant (minimally provide 1.5 g PRO/kg/day)  -Diet/Resp: remains NPO/intubated  -Enteral access: AXR today (postop) confirmed FT to reside ND/NJT (over duodenojejunal flexure)  -EN: none ordered    Interventions:  Collaboration and Referral of Nutrition care - Discussed plan for FEN/GI on rounds with Providers who report approp to resume renal TF with fiber (as noted that out of total 12.6 g of fiber in this formula, 8.4 g are from fructo-oligosaccharides) and adv by 10 ml q 8 hrs.  Ordered to restart/adv to the following goal EN regimen:  --Nepro @ goal 60 ml/hr (1440 ml/day) to provide 2592 kcals (28+ kcal/kg/day), 117 g PRO (1.3+ g/kg/day), 1051 ml free H2O, 232 g CHO and 18 g Fiber daily.   --Additionally ordered to Prosource 1 pkt TID (3 pkts/day = additional 120 kcals, 33 g PRO)  **Nepro + Prosource will provide a total of 2712 kcals (29 kcal/kg) and 150 g PRO (1.6 g/kg/day)    Janice Christianson ,RD, LD, [facility] (pgr [phone])

## 2017-07-27 NOTE — PROGRESS NOTES
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 Readmitted to ICU 7/26 due to respiratory failure and reintubated. CT scan showing retroperitoneal air concerning for perforation, presumed secondary to colonoscopy 7/21.    Summary of Events  7/4 negative ex-lap  7/5 washout and closure  7/9 pigtail catheter placed (removed 7/12)  7/12 Bronchoscopy  7/14 Paracentesis, negative SBP  7/17 Transfer to floor  7/25 Transfer to ICU due to respiratory failure  7/26 RTOR, ex-lap, right hemicolectomy with end-ileostomy and mucus fistula      PLAN:    Changes/Events 7/27:  - Wean propofol today.  - Lost six liters of ascites in the OR.  Continue scheduled albumin. Will replace 25g albumin/liter if high volume loss out of drain  - Abdominal xray to view the feeding tube placement.   Start TF after this.  - 10-14 days of antibiotics from today  - Call CRS to discuss drain plan  - Stop fludrocortisone     Neuro/ pain/ sedation:  - Monitor neurological status. Notify the MD for any acute changes in exam.  - Propofol wean  - Fentanyl wean  - Neurology recommending no LP at this time     CV:   #Sinus tachycardia without hypotension   - No need for pressors at this time  - Continuous cardiac monitoring.   #History of Hypertension  - Holding PTA anti-hypertensives     Pulm:   #Acute respiratory failure  #Bilateral pleural effusions, pulmonary edema  - Ventilated at 16/500/8  - P/S trials BID  - PRN Duo-nebs     FEN/ GI:   #Free air in retroperitoneum s/p right hemicolectomy, end ileostomy, mucosa fistula  # Liver failure with continue ascites loss  - Follow drain output, replace as needed  - Follow  ostomy output for need to replace  #Need for nutritional supplementation  - ICU electrolyte replacement protocol  - Abdominal x-ray to confirm placement  - Start TF at trophic rates     Graft Function/Immunosuppression:  #s/p Liver transplant  - Unremarkable liver ultrasound  #Need for immunosuppression  - CellCept 250 mg BID: Held since 6/27/17 due to neutropenia..   - Prograf held per transplant due to EJ     Renal/fluids:  - Vazquez catheter to stay in place for strict intake and output monitoring  - MIVF @100ml/hr  # EJ  # Intraabdominal hypertension with concern for compartment syndome  - Low urine output, vazquez to remain for strict I&O measurement  - Creatinine elevated from 1.6 baseline  - No indications for dialysis at this time  - Nephrology consulted  - Monitor electrolytes     Endocrine:   #Diabetes mellitus T2  - Insulin gtt     ID/ Abx:  #Severe Sepsis, concern for abdominal source  #Fever  - paracentesis with 5L of yellow turbid fluid drained, sent for cultures and routine ascites labs  - UA, Ucx, blood cultures, chest x-ray  - CT chest, abdomen, and pelvis  - Meropenem, Daptomycin, Micafungin IV per IC   - transplant ID following, appreciates recs  - EBV PCR every other week.   #CMV viremia, +Donor/-recipient transplant  - CMV D+R-, low viral count. MMF held.   - Increase IV Ganciclovir 7/25/2017 per ID (started 07/20).   - Repeat CMV PCR 7/27/2017.      Heme: Hemoglobin stable. Will recheck in AM or earlier if there is clinical evidence of active bleeding.  - Daily CBC  # Right brachial arterial clot from art line  - Daily ASA  # Distal extremity ischemia secondary to pressors  - Monitor for signs of infection      Prophylaxis:    - DVT: SCDs, SubQ Heparin  - GI: Protonix      MSK:  - PT/OT     Lines/Drains:  - R CVC, PIV, Vazquez, NJ, OG     Disposition: Surgical ICU     Patient seen, findings and plan discussed with surgical ICU staff, Dr. Feliz.     Walter Topete, MS4    Arden Page MD  PGY-2  General Surgery      ====================================    TODAY'S PROGRESS:   SUBJECTIVE:   - Patient stable following surgery. Fever 101.5 this AM, some sweats associated.      OBJECTIVE:   1. VITAL SIGNS:   Temp:  [99  F (37.2  C)-102  F (38.9  C)] 99  F (37.2  C)  Heart Rate:  [] 101  Resp:  [20-28] 21  BP: ()/(60-79) 125/76  FiO2 (%):  [40 %] 40 %  SpO2:  [97 %-100 %] 98 %  Ventilation Mode: CMV/AC  FiO2 (%): 40 %  Rate Set (breaths/minute): 18 breaths/min  Tidal Volume Set (mL): 550 mL  PEEP (cm H2O): 8 cmH2O  Oxygen Concentration (%): 40 %  Resp: 21    2. INTAKE/ OUTPUT:   I/O last 3 completed shifts:  In: 5078.05 [I.V.:3137.05; NG/GT:120]  Out: 2410 [Urine:1025; Drains:635; Other:200; Stool:350; Blood:200]    3. PHYSICAL EXAMINATION:   - Gen: adult male Intubated and sedated  - HEENT: MMM, ETT in place, OG  - CVS: NR/RR, no murmurs noted.  - Pulm: CTA bilaterally.  - Abd: soft, non-tender, distended, no guarding  - Ext: warm, well-perfused; peripheral edema 2+, distal pulses b/l. Dusky tips on the rtaples in place for midline incision.ight index and right large toe   - Incision: C/D/I without surrounding erythema, drainage, or fluctuance; staples in place for midline incision. Suture in place in LLQ at prior paracentesis site c/d/i. Right colostomy with thin bilious output. Mucous fistula on left.    4. INVESTIGATIONS:   Arterial Blood Gases     Recent Labs  Lab 07/26/17  2243 07/26/17  2017 07/25/17  1746 07/25/17  1037   PH Incorrect specimen typeNOTTIED BY WOJCIECH DEWITT, NEW ORDER TO REPLACE.7/26/17 AT 2346 BY ZAINAB.CORRECTED ON 07/26 AT 2350: PREVIOUSLY REPORTED AS 7.27 Canceled, Test credited Incorrect specimen type 7.32* 7.14*   PCO2 Incorrect specimen typeNOTTIED BY YASHIRA SHARMA ORDER TO REPLACE.7/26/17 AT 2346 BY ZAINAB.CORRECTED ON 07/26 AT 2350: PREVIOUSLY REPORTED AS 45 Canceled, Test credited Incorrect specimen type 42 69*   PO2 Incorrect specimen typeNOTTIED BY YASHIRA SHARMA  ORDER TO REPLACE.7/26/17 AT 2346 BY ZAINAB.CORRECTED ON 07/26 AT 2350: PREVIOUSLY REPORTED AS 53 Canceled, Test credited Incorrect specimen type 81 103   HCO3 Incorrect specimen typeNOTTIED BY WOJCIECH DEWITT, NEW ORDER TO REPLACE.7/26/17 AT 2346 BY ZAINAB.CORRECTED ON 07/26 AT 2350: PREVIOUSLY REPORTED AS 20 Canceled, Test credited Incorrect specimen type 22 23     Complete Blood Count     Recent Labs  Lab 07/27/17 0410 07/26/17 2243 07/26/17 2133 07/26/17 2046 07/26/17 2017 07/26/17  0431 07/25/17  1651   WBC 10.0 10.3  --   --   --   --  7.7 10.3   HGB 9.3* 9.1* 9.5* 9.6* 9.8*  Canceled, Test credited Incorrect specimen type  < > 5.9* 7.5*   * 136*  --   --  138*  --  109* 141*   < > = values in this interval not displayed.  Basic Metabolic Panel    Recent Labs  Lab 07/27/17 0410 07/26/17 2243 07/26/17 2133 07/26/17 2046 07/26/17 0431 07/25/17  1651    140 139 139  < > 140 138   POTASSIUM 3.5 3.5 3.5 3.5  < > 3.5 4.4   CHLORIDE 108 108  --   --   --  108 106   CO2 20 21  --   --   --  23 24   BUN 72* 75*  --   --   --  74* 79*   CR 2.82* 2.76*  --   --   --  3.09* 2.90*   * 151* 155* 151*  < > 129* 176*   < > = values in this interval not displayed.  Liver Function Tests    Recent Labs  Lab 07/27/17 0410 07/26/17 2243 07/26/17 2017 07/26/17 1620 07/25/17  1651 07/25/17  0945 07/24/17  0705   AST 27  --   --   --  61* 90* 86*   ALT 27  --   --   --  50 60 54   ALKPHOS 143  --   --   --  242* 317* 264*   BILITOTAL 1.3  --   --   --  0.6 0.5 0.4   ALBUMIN 2.4*  --   --   --  2.0* 1.9* 1.7*   INR  --  1.31* 1.31* 1.30* 1.23*  --  1.11     Pancreatic Enzymes  No lab results found in last 7 days.  Coagulation Profile    Recent Labs  Lab 07/26/17  2243 07/26/17  2017 07/26/17  1620 07/25/17  1651   INR 1.31* 1.31* 1.30* 1.23*   PTT  --  39* 44* 41*     Lactate  Invalid input(s): LACTATE    5. RADIOLOGY:   Recent Results (from the past 24 hour(s))   XR Chest Port 1 View    Narrative    EXAM: XR  CHEST PORT 1 VW  7/25/2017 1:30 PM      HISTORY: endotracheal tube positioning    COMPARISON: Chest radiograph the earlier today, 7/18/2017, CT  7/13/2017    FINDINGS:     Single AP view of the chest.     Endotracheal tube tip projects approximately 4.8 cm above the chacorta.    Stable large cardiomediastinal silhouette. Persistent pulmonary edema    Persistent moderate bilateral pleural effusion with overlying  atelectasis/consolidation. No pneumothorax.       Impression    IMPRESSION:   New endotracheal tube tip projects approximately 4.8 cm above the  chacorta. Persistent pulmonary edema. Persistent left lung base  atelectasis/consolidation.    I have personally reviewed the examination and initial interpretation  and I agree with the findings.    DAMIEN LIRA MD   CT Chest Abdomen Pelvis w/o Contrast   Result Value    Radiologist flags Retroperitoneal gas (Urgent)    Narrative    EXAMINATION: CT CHEST ABDOMEN PELVIS W/O CONTRAST, 7/25/2017 5:32 PM    TECHNIQUE:  Helical CT images from the thoracic inlet through the  symphysis pubis were obtained without contrast.     COMPARISON: CT 7/13/2017    HISTORY: evaluate for infection    FINDINGS:    Chest: Endotracheal tube tip 4.8 cm from the chacorta. Gastric tube tip  in the gastric fundus. Feeding tube tip near the ligament of Treitz.    Partially visualized thyroid is normal. Scattered prominent  mediastinal lymph nodes, for example a 12 mm right paratracheal lymph  node (axial series 5, image 23). Heart is mildly enlarged. Hypodense  blood pool, compatible with clinical history of anemia. No pericardial  effusion.    Small bilateral pleural effusions. No pneumothorax. Bibasilar  atelectasis versus consolidation.    Abdomen and pelvis: Postsurgical changes of liver transplant. No focal  liver lesion. The spleen is enlarged measuring 22 cm in the  midclavicular line. The adrenal glands, pancreas, kidneys, and small  bowel have a normal appearance on this nonenhanced  study. Scattered  colonic diverticula. Infrarenal IVC filter.    In the right lower quadrant, there is a new 14.9 x 6.1 x 11.5 cm  ill-defined area of retroperitoneal gas which extends superiorly to  abut the second/third portions of the duodenum. No definite bowel  perforation is seen.    Large volume ascites with extensive mesenteric and soft tissue edema.  Innumerable mesenteric and retroperitoneal lymph nodes which are  likely reactive. Small hematoma in the ventral abdominal subcutaneous  fat (for example axial series 5, image 212).    Small amount of air in the bladder is likely related to Crowe  catheter.    Bones: No suspicious bony abnormality.      Impression    IMPRESSION:   1. New large heterogeneous area of right-sided retroperitoneal gas  which is highly concerning for an infectious process. Given the  history of prior neutropenic colitis and biopsies, there could also be  a component of stool from a ruptured viscus, despite the lack of  surrounding bowel loops and no extraluminal oral contrast. Surgical  consultation is recommended.     2. Bibasilar atelectasis versus consolidation with small bilateral  pleural effusions.    3. Extensive mesenteric and soft tissue edema with large volume  ascites. Numerous mesenteric and retroperitoneal lymph nodes which are  presumably reactive.    4. Postsurgical changes of liver transplant.    [Urgent Result: Retroperitoneal gas]    Finding was identified on 7/25/2017 5:34 PM.     Dr. Santoyo was contacted by Dr. Atkins at 7/25/2017 5:37 PM and  verbalized understanding of the urgent finding.     I have personally reviewed the examination and initial interpretation  and I agree with the findings.    LUCI HOROWITZ MD   XR Chest Port 1 View    Narrative    XR CHEST PORT 1 VW 7/25/2017 6:56 PM    History: confirm RIJ central line    Comparison: CT and radiograph 7/25/2017    Findings: Right IJ catheter tip projects over the superior cavoatrial  junction. Gastric  tube sidehole projects over the stomach. Enteric  tube courses in the stomach with the tip below the field of view.  Endotracheal tube tip projects over the upper thoracic trachea.    Cardiomegaly with unchanged dense bibasilar opacities and small  bilateral pleural effusions. Unchanged perihilar and peripheral  interstitial opacities. No pneumothorax. Surgical clips in the upper  abdomen.      Impression    Impression:   1. Right IJ catheter tip projects over the superior cavoatrial  junction.  2. Mild cardiomegaly with small bilateral pleural effusions.  3. Bibasilar atelectasis versus consolidation.    I have personally reviewed the examination and initial interpretation  and I agree with the findings.    LUCI HOROWITZ MD   US Liver Transplant    Narrative    EXAMINATION: US LIVER TRANSPLANT, 7/26/2017 12:25 AM     COMPARISON: CT cap 7/25/2017.    HISTORY: Sepsis, 4 month post liver transplant.    TECHNIQUE:  Gray-scale, color Doppler and spectral flow analysis.    FINDINGS:   Bilateral pericolic gutter ascites.    Liver:   The liver demonstrates normal homogeneous echotexture. No  evidence of a focal hepatic mass.     Bile Ducts: Both the intra- and extrahepatic biliary system are of  normal caliber.  The common bile duct measures 2.9 mm in diameter.    Gallbladder: The gallbladder is surgically absent.    Kidneys:   Right kidney:  The right kidney demonstrates normal echotexture with  no evidence of a shadowing stone, focal mass or hydronephrosis.   12.5  cm in long axis dimension.  Left kidney:  The left kidney demonstrates normal echotexture with no  evidence of a shadowing stone, focal mass or hydronephrosis.   13.2 cm  in long axis dimension.    Pancreas: Obscured    Spleen: Spleen measures 22.2 cm. Visualized portions of the aorta are  unremarkable.    LIVER DOPPLER:  Splenic vein: Not visualized  Extrahepatic portal vein:  Patent continuous antegrade flow, 45  cm/sec.  Portal vein at anastomosis: Patent  continuous antegrade flow, 131  cm/sec.  Intrahepatic portal vein:  Patent continuous antegrade flow, 91  cm/sec.  Right portal vein flow is antegrade, measuring 55 cm/sec.  Left portal vein flow is antegrade, measuring 57 cm/sec.    Inferior vena cava: patent with flow toward the heart throughout..  IVC above anastomosis:  162 cm/sec.  IVC at anastomosis:  121 cm/sec.  Intrahepatic IVC:  75 cm/sec.  Extrahepatic IVC:  39 cm/sec.    Right, mid, left hepatic veins: Patent with flow towards the inferior  vena cava.    Extrahepatic hepatic artery: Low resistance waveform with flow towards  the liver. 99 cm/sec with resistive index 0.84.  Right hepatic artery: 115 cm/sec with resistive index 0.75.  Left hepatic artery: 71 cm/sec with resistive index 0.79.      Impression    Impression:   1.  Unremarkable Doppler assessment of liver transplant.  2.  Left and right paracolic gutters ascites.  3.  Splenomegaly.    I have personally reviewed the examination and initial interpretation  and I agree with the findings.    LUCI HOROWITZ MD   XR Abdomen Port 1 View    Narrative    History: Colonic perforation.    COMPARISON: Single view of the abdomen 7/25/2017.    FINDINGS: Feeding tube present and in good position. IVC filter,  paramidline skin staples and upper abdominal surgical clips again  noted. No gas-filled, dilated loops of large or small bowel. Oral  contrast material in the right colon and extending into the transverse  colon across midline. Stool burden appears to be moderate to large  with stool filling most of the cecum through the descending sigmoid  junction. No gross free intracranial air.      Impression    IMPRESSION: No evidence for bowel obstruction or gross free  intraperitoneal air. Moderate to large stool burden.    TESSA THOMAS MD       =========================================

## 2017-07-27 NOTE — ANESTHESIA CARE TRANSFER NOTE
Patient: Camacho Bhagat    Procedure(s):  Exploratory Laparotomy, Right angelique-colectomy, end ileostomy, mucosal fistula, partial omentectomy - Wound Class: II-Clean Contaminated    Diagnosis: Colon Perforation  Diagnosis Additional Information: No value filed.    Anesthesia Type:   General     Note:  Airway :ETT  Patient transferred to:ICU  Comments: Pt transported to ICU fully monitored.  Ambu at 8L.  Oxygen sats 98%.  Placed on vent settings per ICU team.  VSS.  Report given to RN at bedside.       Vitals: (Last set prior to Anesthesia Care Transfer)    CRNA VITALS  7/26/2017 2205 - 7/26/2017 2256      7/26/2017             Ht Rate: (!)  0                Electronically Signed By: LAMIN Matthew CRNA  July 26, 2017  10:56 PM

## 2017-07-27 NOTE — PROGRESS NOTES
Immunosuppression Note:    Camacho Bhagat is a 51 year old male who is seen today  for immunosuppression management     I, Jovan Tran MD, I have examined the patient with the resident/PA/Fellow, discussed and agree with the note and findings.  I have reviewed today's vital signs, medications, labs and imaging. I reviewed the immunosuppression medications and levels. 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 Concern for CMV colitis with low count CMV viremia/GI PTLD and subsequently underwent colonoscopy on 7/21, random biopsies obtained.  On 7/25/2017 patient had respiratory distress, abdominal compartment syndrome and EJ with oliguria. Broad spectrum antibiotics were started.  He was intubated and had a paracentesis with 5L removed. He did not required pressors.  CT was obtained which showed a new large heterogeneous right sided retroperitoneal gas and air tracking along duodenum.  No contrast extravasation.  No intraperitoneal air noted. Colorectal surgery was consulted. Initial conservative management with bowel rest and IV abx. Pt continued to spike fevers despite IV abx.  Now s/p Exploratory laparotomy, right angelique-colectomy, end ileostomy, mucus fistula, partial omentectomy on 7/26.    Graft Function: Good function.  LFTs WNL. US liver unremarkable.  Immunosuppression Management:   CellCept discontinued (6/27/17) due to neutropenia. .   Prograf goal ~8 due to single IS. 7/27 > 24 hr level < 3. Subtherapeutic  despite frequent dose increases, now with increased level. HOLD for now due to EJ.   Cardiorespiratory: Acute respiratory distress with hypercapnia. Intubated.   CXR-pulmonary edema, moderate pleural effusions. CT scan chest, small b/l pleural effusions.  Hematology: Pancytopenia on admission, acute on chronic, secondary to medications (MMF, bactrim, valcyte). Improved with holding medications and neupogen. Possible due to CMV.  Started IV Ganciclovir 7/20.  Continue to hold MMF and bactrim.   -Anemia- HGB 9.3. Blood transfusion 1 unit 7/18, 4 units 7/26.   -Cytology ascites fluid, negative for malignancy   GI: Neutropenic colitis. Colonoscopy 7/21. Biopsy results showing resolving injury secondary to possible drug induced vs infectious.   -CT scan abd/pelvis, po contrast, showed a new large heterogeneous right sided retroperitoneal gas and air tracking along duodenum.  No contrast extravasation.  No intraperitoneal air noted. Colorectal surgery consulted.  Continued to spike high grade temperature despite IV antibiotics therefore patient taken to OR. s/p Exploratory laparotomy, right angelique-colectomy, end ileostomy, mucus fistula, partial omentectomy on 7/26. Path in process. Findings no neida perforation, phlegmon/inflammationright colon. WOCN consult for ostomy care.   -NPO. TF held.   Fluid/Electrolytes/Renal: EJ oliguric likely sec to high intraabdominal pressures and ischemic ATN sec to sepsis. Nephrology consulted. No indication for RRT presently. UO slightly increased. Cr elevated, 2.8  Endocrine: DM type 2. On insulin gtt.   Infectious disease:   -Severe sepsis, right colon phlegmon -ABx with meropenem/daptomycin/micafungin. S/p right angelique-colectomy. Path pending. Fluid cx x 1 negative and 1 in process.  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  CMV discordent with CMV PCR increasing. Continue IV ganciclovir. Follow CMV PCR weekly  -7/25 Ucx, Bcx and ascites cx negative to date  -ID consulting.    Neuro: Toxic metabolic encephalopathy secondary to infection, prolonged hospital stay.  Vascular: Right Arterial line clot 2/2 previous arterial line. Vascular consulted. Recommend ASA only. Some evidence of microvascular injury in digits (toes) this is most likely due to injury while patient was on pressor therapy and sepsis. Now with a third toe injury, vascular consulted again. US completed.  ECHO EF 60-65%. 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   Activity: PT/OT  Prophylaxis: DVT-Heparin SQ  Disposition: SICU.    Medical Decision Making: Medium  Admit 10464 (moderate level decision making)    PILLO/Fellow/Resident Provider: Ada Driver, PAC [id number]    Faculty: Jovan Tran M.D.  __________________________________________________________________  Transplant History:.  3/4/2017 DD OLT with Dr. Tran (Liver), Postoperative day: 145     Interval History:   Overnight events: On vent. HDS, no pressors. Tmax 102.     ROS:   A 10-point review of systems was negative except as noted above.    Meds:    ganciclovir (CYTOVENE) intermittent infusion  2.5 mg/kg (Dosing Weight) Intravenous Q12H     meropenem  1 g Intravenous Q12H     DAPTOmycin (CUBICIN) intermittent infusion  8 mg/kg (Dosing Weight) Intravenous Q24H     micafungin  100 mg Intravenous Q24H     albumin human  25 g Intravenous Q6H     aspirin  80 mg Oral Daily     multivitamins with minerals  15 mL Per Feeding Tube Daily     heparin  5,000 Units Subcutaneous Q8H     pantoprazole  40 mg Oral or Feeding Tube Daily     sodium chloride (PF)  3 mL Intracatheter Q8H       Physical Exam:     Admit Weight: 94.2 kg (207 lb 11.2 oz) (SCALE 2)    Current vitals:   /78  Pulse 90  Temp 101.8  F (38.8  C) (Axillary)  Resp 24  Ht 1.829 m (6')  Wt 99 kg (218 lb 4.1 oz)  SpO2 100%  BMI 29.6 kg/m2    Vital sign ranges:    Temp:  [99  F (37.2  C)-102  F (38.9  C)] 101.8  F (38.8  C)  Heart Rate:  [] 101  Resp:  [20-28]  24  BP: ()/(60-79) 148/78  FiO2 (%):  [40 %] 40 %  SpO2:  [98 %-100 %] 100 %  Patient Vitals for the past 24 hrs:   BP Temp Temp src Heart Rate Resp SpO2 Weight   07/27/17 1300 148/78 - - 101 - 100 % -   07/27/17 1200 120/72 101.8  F (38.8  C) Axillary 99 24 100 % -   07/27/17 1100 119/68 - - 99 - 100 % -   07/27/17 1000 118/65 - - 98 24 98 % -   07/27/17 0925 - - - - - 98 % -   07/27/17 0900 125/76 - - 101 21 99 % -   07/27/17 0800 109/65 99  F (37.2  C) Axillary 97 21 100 % -   07/27/17 0700 - - - - 21 - -   07/27/17 0600 111/63 - - 102 22 100 % -   07/27/17 0500 104/61 - - 99 23 100 % -   07/27/17 0400 115/73 100.4  F (38  C) Axillary 101 22 100 % 99 kg (218 lb 4.1 oz)   07/27/17 0300 120/79 - - 96 20 100 % -   07/27/17 0230 110/67 - - 96 - 100 % -   07/27/17 0200 99/60 - - 97 22 100 % -   07/27/17 0130 109/65 - - 101 - 100 % -   07/27/17 0100 104/63 - - 101 22 100 % -   07/27/17 0045 101/67 - - 102 28 99 % -   07/27/17 0030 105/62 - - 101 27 99 % -   07/27/17 0015 106/69 - - 103 - 99 % -   07/27/17 0000 114/69 - - 101 - 99 % -   07/26/17 2345 121/64 100.4  F (38  C) Axillary 101 25 100 % -   07/26/17 2330 118/70 - - 98 - 99 % -   07/26/17 1800 150/76 - - 89 24 98 % -   07/26/17 1730 142/66 102  F (38.9  C) Axillary 87 24 100 % -   07/26/17 1715 - - - 87 - 99 % -   07/26/17 1700 139/68 - - 87 23 99 % -   07/26/17 1630 143/71 101.3  F (38.5  C) - 86 23 98 % -   07/26/17 1621 141/70 101.5  F (38.6  C) Axillary 89 23 98 % -   07/26/17 1615 - - - 86 - 98 % -   07/26/17 1600 144/71 101.5  F (38.6  C) Axillary 89 23 98 % -   07/26/17 1552 - - - - - 99 % -   07/26/17 1500 136/70 101.5  F (38.6  C) Axillary 87 23 99 % -     General Appearance: sedated  Skin: normal, warm, dry  Heart: tachycardic  Lungs: B/l course BS. On vent  Abdomen:soft, less distended. Midline incision, c/d/i. Chevron incision well healed.   : vazquez is present, small amount dk UO.   Extremities: BLE mild edema.  Neurologic: sedated.    CVC    Data:   CMP    Recent Labs  Lab 07/27/17 0410 07/26/17 2243 07/26/17  2133 07/26/17  2046  07/26/17  0431 07/25/17  1651  07/25/17  1335    140 139 139  < > 140 138  --   --    POTASSIUM 3.5 3.5 3.5 3.5  < > 3.5 4.4  --   --    CHLORIDE 108 108  --   --   --  108 106  --   --    CO2 20 21  --   --   --  23 24  --   --    * 151* 155* 151*  < > 129* 176*  --   --    BUN 72* 75*  --   --   --  74* 79*  --   --    CR 2.82* 2.76*  --   --   --  3.09* 2.90*  --   --    GFRESTIMATED 24* 24*  --   --   --  21* 23*  --   --    GFRESTBLACK 29* 29*  --   --   --  26* 28*  --   --    JACOB 7.9* 7.6*  --   --   --  7.9* 8.0*  --   --    ICAW  --   --  4.4 4.3*  < >  --   --   --   --    MAG 2.0  --   --   --   --  1.9 1.9  < >  --    PHOS 5.8*  --   --   --   --  4.1 4.8*  < >  --    ALBUMIN 2.4*  --   --   --   --   --  2.0*  --   --    BILITOTAL 1.3  --   --   --   --   --  0.6  --   --    ALKPHOS 143  --   --   --   --   --  242*  --   --    AST 27  --   --   --   --   --  61*  --   --    ALT 27  --   --   --   --   --  50  --   --    FAMY  --   --   --   --   --   --   --   --  8   < > = values in this interval not displayed.  CBC    Recent Labs  Lab 07/27/17 0410 07/26/17 2243   HGB 9.3* 9.1*   WBC 10.0 10.3   * 136*     COAGS    Recent Labs  Lab 07/26/17 2243 07/26/17  2017 07/26/17  1620   INR 1.31* 1.31* 1.30*   PTT  --  39* 44*      Urinalysis  Recent Labs   Lab Test  07/25/17   1205  07/19/17   1150   06/14/17   1508   04/11/16   1345   COLOR  Dark Yellow  Yellow   < >   --    < >  Yellow   APPEARANCE  Cloudy  Slightly Cloudy   < >   --    < >  Clear   URINEGLC  70*  Negative   < >   --    < >  30*   URINEBILI  Negative  Negative   < >   --    < >  Negative   URINEKETONE  Negative  Negative   < >   --    < >  Negative   SG  1.014  1.009   < >   --    < >  1.016   UBLD  Moderate*  Moderate*   < >   --    < >  Small*   URINEPH  5.0  5.0   < >   --    < >  5.0   PROTEIN  100*  10*   < >    "--    < >  30*   NITRITE  Negative  Negative   < >   --    < >  Negative   LEUKEST  Trace*  Negative   < >   --    < >  Negative   RBCU  >182*  6*   < >   --    < >  1   WBCU  5*  2   < >   --    < >  1   UTPG   --    --    --   1.55*   --   0.41*    < > = values in this interval not displayed.          7/21/2017   SPECIMEN(S):   A: Colon biopsy, ascending   B: Colon biopsy, descending     FINAL DIAGNOSIS:   A. COLON BIOPSY, ASCENDING:   - Colonic mucosa with no significant histologic abnormality   - Negative for intraepithelial lymphocytosis or deposition of   subepithelial thick collagenous band     B. COLON BIOPSY, DESCENDING:   - Crypt architecture distortion, consistent with healed prior injury   - Negative for active inflammation or dysplasia   - Negative for intraepithelial lymphocytosis or deposition of   subepithelial thick collagenous band   - Please, see comment     COMMENT:   Specimen B, \"descending colon\" features lamina propria edema, slight   variation in crypt sizes and shapes and occasional crypt drop-out.  This   may indicate a resolving injury which could be drug-induced or   infectious.     CT scan 7/25/2017:   IMPRESSION:   1. New large heterogeneous area of right-sided retroperitoneal gas  which is highly concerning for an infectious process. Given the  history of prior neutropenic colitis and biopsies, there could also be  a component of stool from a ruptured viscus, despite the lack of  surrounding bowel loops and no extraluminal oral contrast. Surgical  consultation is recommended.      2. Bibasilar atelectasis versus consolidation with small bilateral  pleural effusions.     3. Extensive mesenteric and soft tissue edema with large volume  ascites. Numerous mesenteric and retroperitoneal lymph nodes which are  presumably reactive.     4. Postsurgical changes of liver transplant.     [Urgent Result: Retroperitoneal gas]     Finding was identified on 7/25/2017 5:34 PM.      Dr. Santoyo was " contacted by Dr. Atkins at 7/25/2017 5:37 PM and  verbalized understanding of the urgent finding.      I have personally reviewed the examination and initial interpretation  and I agree with the findings.     LUCI HOROWITZ MD

## 2017-07-27 NOTE — OP NOTE
[facility] Brief Operative Note     Pre-operative diagnosis: Colon Perforation   Post-operative diagnosis Inflammation  around right colon, no neida perfortation   Procedure: Procedure(s):  Exploratory Laparotomy, Right angelique-colectomy, end ileostomy, mucosal fistula, partial omentectomy - Wound Class: II-Clean Contaminated   Surgeon: Vinicio Quiroz MD   Assistants(s): Lawson Floyd MD, Karla Kraus MD   Estimated blood loss: 200 cc   Specimens: Right colon   Findings:           IVF: Crystalloids 600 cc  2 units PRBCS, 1 FFP  UOP: 250 cc     Drain: LUQ TABITHA  Complications: None                            Inflammation around the right colon, no neida perforation and no necrosis.     Karla Kraus, PGY-1      Sara Dinh MD  Colon and Rectal Surgery Attending  Department of Surgery  [facility]

## 2017-07-27 NOTE — ANESTHESIA POSTPROCEDURE EVALUATION
Patient: Camacho Bhagat    Procedure(s):  Exploratory Laparotomy, Right angelique-colectomy, end ileostomy, mucosal fistula, partial omentectomy - Wound Class: II-Clean Contaminated    Diagnosis:Colon Perforation  Diagnosis Additional Information: No value filed.    Anesthesia Type:  General    Note:  Anesthesia Post Evaluation    Patient location during evaluation: ICU  Patient participation: Unable to participate in evaluation secondary to underlying medical condition  Level of consciousness: sleepy but conscious and obtunded/minimal responses  Pain management: unable to assess  Airway patency: patent  Cardiovascular status: acceptable  Respiratory status: acceptable  Hydration status: acceptable  PONV: unable to assess     Anesthetic complications: None          Last vitals:  Vitals:    07/26/17 1715 07/26/17 1730 07/26/17 1800   BP:  142/66 150/76   Pulse:      Resp:  24 24   Temp:  38.9  C (102  F)    SpO2: 99% 100% 98%         Electronically Signed By: Toñito Bojorquez DO  July 26, 2017  11:36 PM

## 2017-07-28 ENCOUNTER — APPOINTMENT (OUTPATIENT)
Dept: GENERAL RADIOLOGY | Facility: CLINIC | Age: 52
End: 2017-07-28
Attending: NURSE PRACTITIONER
Payer: COMMERCIAL

## 2017-07-28 ENCOUNTER — APPOINTMENT (OUTPATIENT)
Dept: OCCUPATIONAL THERAPY | Facility: CLINIC | Age: 52
End: 2017-07-28
Attending: TRANSPLANT SURGERY
Payer: COMMERCIAL

## 2017-07-28 ENCOUNTER — APPOINTMENT (OUTPATIENT)
Dept: GENERAL RADIOLOGY | Facility: CLINIC | Age: 52
End: 2017-07-28
Attending: TRANSPLANT SURGERY
Payer: COMMERCIAL

## 2017-07-28 LAB
ALBUMIN UR-MCNC: 30 MG/DL
ANION GAP SERPL CALCULATED.3IONS-SCNC: 11 MMOL/L (ref 3–14)
ANION GAP SERPL CALCULATED.3IONS-SCNC: 13 MMOL/L (ref 3–14)
APPEARANCE UR: ABNORMAL
BACTERIA #/AREA URNS HPF: ABNORMAL /HPF
BACTERIA SPEC CULT: ABNORMAL
BASOPHILS # BLD AUTO: 0 10E9/L (ref 0–0.2)
BASOPHILS NFR BLD AUTO: 0.1 %
BILIRUB UR QL STRIP: NEGATIVE
BUN SERPL-MCNC: 70 MG/DL (ref 7–30)
BUN SERPL-MCNC: 71 MG/DL (ref 7–30)
CALCIUM SERPL-MCNC: 7.7 MG/DL (ref 8.5–10.1)
CALCIUM SERPL-MCNC: 8.1 MG/DL (ref 8.5–10.1)
CHLORIDE SERPL-SCNC: 112 MMOL/L (ref 94–109)
CHLORIDE SERPL-SCNC: 115 MMOL/L (ref 94–109)
CIDOFOVIR ISLT GENOTYP: NORMAL
CMV DNA SPEC NAA+PROBE-ACNC: 290 [IU]/ML
CMV DNA SPEC NAA+PROBE-LOG#: 2.5 {LOG_IU}/ML
CO2 SERPL-SCNC: 18 MMOL/L (ref 20–32)
CO2 SERPL-SCNC: 20 MMOL/L (ref 20–32)
COLOR UR AUTO: YELLOW
CREAT SERPL-MCNC: 1.82 MG/DL (ref 0.66–1.25)
CREAT SERPL-MCNC: 2.15 MG/DL (ref 0.66–1.25)
DIFFERENTIAL METHOD BLD: ABNORMAL
EOSINOPHIL # BLD AUTO: 0.1 10E9/L (ref 0–0.7)
EOSINOPHIL NFR BLD AUTO: 0.9 %
ERYTHROCYTE [DISTWIDTH] IN BLOOD BY AUTOMATED COUNT: 16.7 % (ref 10–15)
FOSCARNET ISLT GENOTYP: NORMAL
GANCICLOVIR ISLT GENOTYP: NORMAL
GFR SERPL CREATININE-BSD FRML MDRD: 32 ML/MIN/1.7M2
GFR SERPL CREATININE-BSD FRML MDRD: 39 ML/MIN/1.7M2
GLUCOSE BLDC GLUCOMTR-MCNC: 101 MG/DL (ref 70–99)
GLUCOSE BLDC GLUCOMTR-MCNC: 110 MG/DL (ref 70–99)
GLUCOSE BLDC GLUCOMTR-MCNC: 113 MG/DL (ref 70–99)
GLUCOSE BLDC GLUCOMTR-MCNC: 117 MG/DL (ref 70–99)
GLUCOSE BLDC GLUCOMTR-MCNC: 129 MG/DL (ref 70–99)
GLUCOSE BLDC GLUCOMTR-MCNC: 132 MG/DL (ref 70–99)
GLUCOSE BLDC GLUCOMTR-MCNC: 133 MG/DL (ref 70–99)
GLUCOSE BLDC GLUCOMTR-MCNC: 134 MG/DL (ref 70–99)
GLUCOSE BLDC GLUCOMTR-MCNC: 144 MG/DL (ref 70–99)
GLUCOSE BLDC GLUCOMTR-MCNC: 148 MG/DL (ref 70–99)
GLUCOSE BLDC GLUCOMTR-MCNC: 154 MG/DL (ref 70–99)
GLUCOSE BLDC GLUCOMTR-MCNC: 155 MG/DL (ref 70–99)
GLUCOSE BLDC GLUCOMTR-MCNC: 163 MG/DL (ref 70–99)
GLUCOSE BLDC GLUCOMTR-MCNC: 166 MG/DL (ref 70–99)
GLUCOSE BLDC GLUCOMTR-MCNC: 93 MG/DL (ref 70–99)
GLUCOSE SERPL-MCNC: 136 MG/DL (ref 70–99)
GLUCOSE SERPL-MCNC: 161 MG/DL (ref 70–99)
GLUCOSE UR STRIP-MCNC: NEGATIVE MG/DL
GRAM STN SPEC: ABNORMAL
HCT VFR BLD AUTO: 24.4 % (ref 40–53)
HGB BLD-MCNC: 8 G/DL (ref 13.3–17.7)
HGB UR QL STRIP: ABNORMAL
HYALINE CASTS #/AREA URNS LPF: 1 /LPF (ref 0–2)
IMM GRANULOCYTES # BLD: 0 10E9/L (ref 0–0.4)
IMM GRANULOCYTES NFR BLD: 0.2 %
KETONES UR STRIP-MCNC: NEGATIVE MG/DL
LEUKOCYTE ESTERASE UR QL STRIP: NEGATIVE
LYMPHOCYTES # BLD AUTO: 1.6 10E9/L (ref 0.8–5.3)
LYMPHOCYTES NFR BLD AUTO: 17.3 %
Lab: ABNORMAL
MAGNESIUM SERPL-MCNC: 2.5 MG/DL (ref 1.6–2.3)
MAGNESIUM SERPL-MCNC: 2.5 MG/DL (ref 1.6–2.3)
MCH RBC QN AUTO: 28.1 PG (ref 26.5–33)
MCHC RBC AUTO-ENTMCNC: 32.8 G/DL (ref 31.5–36.5)
MCV RBC AUTO: 86 FL (ref 78–100)
MICRO REPORT STATUS: ABNORMAL
MICRO REPORT STATUS: ABNORMAL
MONOCYTES # BLD AUTO: 0.5 10E9/L (ref 0–1.3)
MONOCYTES NFR BLD AUTO: 4.9 %
NEUTROPHILS # BLD AUTO: 7.1 10E9/L (ref 1.6–8.3)
NEUTROPHILS NFR BLD AUTO: 76.6 %
NITRATE UR QL: NEGATIVE
NRBC # BLD AUTO: 0 10*3/UL
NRBC BLD AUTO-RTO: 0 /100
PH UR STRIP: 5 PH (ref 5–7)
PHOSPHATE SERPL-MCNC: 5.5 MG/DL (ref 2.5–4.5)
PHOSPHATE SERPL-MCNC: 6.6 MG/DL (ref 2.5–4.5)
PLATELET # BLD AUTO: 128 10E9/L (ref 150–450)
POTASSIUM SERPL-SCNC: 3.8 MMOL/L (ref 3.4–5.3)
POTASSIUM SERPL-SCNC: 3.8 MMOL/L (ref 3.4–5.3)
RBC # BLD AUTO: 2.85 10E12/L (ref 4.4–5.9)
RBC #/AREA URNS AUTO: 5 /HPF (ref 0–2)
SODIUM SERPL-SCNC: 144 MMOL/L (ref 133–144)
SODIUM SERPL-SCNC: 146 MMOL/L (ref 133–144)
SP GR UR STRIP: 1.01 (ref 1–1.03)
SPECIMEN SOURCE: ABNORMAL
URATE CRY #/AREA URNS HPF: ABNORMAL /HPF
URN SPEC COLLECT METH UR: ABNORMAL
UROBILINOGEN UR STRIP-MCNC: NORMAL MG/DL (ref 0–2)
WBC # BLD AUTO: 9.3 10E9/L (ref 4–11)
WBC #/AREA URNS AUTO: 6 /HPF (ref 0–2)
WBC CLUMPS #/AREA URNS HPF: PRESENT /HPF

## 2017-07-28 PROCEDURE — 20000004 ZZH R&B ICU UMMC

## 2017-07-28 PROCEDURE — P9047 ALBUMIN (HUMAN), 25%, 50ML: HCPCS | Performed by: SURGERY

## 2017-07-28 PROCEDURE — 87040 BLOOD CULTURE FOR BACTERIA: CPT | Performed by: NURSE PRACTITIONER

## 2017-07-28 PROCEDURE — 27210913 ZZH NUTRITION PRODUCT INTERMEDIATE PACKET

## 2017-07-28 PROCEDURE — 99291 CRITICAL CARE FIRST HOUR: CPT | Mod: GC | Performed by: SURGERY

## 2017-07-28 PROCEDURE — 81001 URINALYSIS AUTO W/SCOPE: CPT | Performed by: NURSE PRACTITIONER

## 2017-07-28 PROCEDURE — 25000132 ZZH RX MED GY IP 250 OP 250 PS 637: Performed by: STUDENT IN AN ORGANIZED HEALTH CARE EDUCATION/TRAINING PROGRAM

## 2017-07-28 PROCEDURE — 25000128 H RX IP 250 OP 636: Performed by: STUDENT IN AN ORGANIZED HEALTH CARE EDUCATION/TRAINING PROGRAM

## 2017-07-28 PROCEDURE — 71010 XR CHEST PORT 1 VW: CPT

## 2017-07-28 PROCEDURE — 25000128 H RX IP 250 OP 636: Performed by: TRANSPLANT SURGERY

## 2017-07-28 PROCEDURE — 27210432 ZZH NUTRITION PRODUCT RENAL BASIC LITER

## 2017-07-28 PROCEDURE — 25000128 H RX IP 250 OP 636: Performed by: PHYSICIAN ASSISTANT

## 2017-07-28 PROCEDURE — 25000128 H RX IP 250 OP 636: Performed by: SURGERY

## 2017-07-28 PROCEDURE — 97530 THERAPEUTIC ACTIVITIES: CPT | Mod: GO | Performed by: OCCUPATIONAL THERAPIST

## 2017-07-28 PROCEDURE — 00000146 ZZHCL STATISTIC GLUCOSE BY METER IP

## 2017-07-28 PROCEDURE — 80048 BASIC METABOLIC PNL TOTAL CA: CPT | Performed by: STUDENT IN AN ORGANIZED HEALTH CARE EDUCATION/TRAINING PROGRAM

## 2017-07-28 PROCEDURE — 25000131 ZZH RX MED GY IP 250 OP 636 PS 637: Performed by: TRANSPLANT SURGERY

## 2017-07-28 PROCEDURE — P9047 ALBUMIN (HUMAN), 25%, 50ML: HCPCS | Performed by: NURSE PRACTITIONER

## 2017-07-28 PROCEDURE — 25000125 ZZHC RX 250: Performed by: STUDENT IN AN ORGANIZED HEALTH CARE EDUCATION/TRAINING PROGRAM

## 2017-07-28 PROCEDURE — 40000133 ZZH STATISTIC OT WARD VISIT: Performed by: OCCUPATIONAL THERAPIST

## 2017-07-28 PROCEDURE — 87205 SMEAR GRAM STAIN: CPT | Performed by: NURSE PRACTITIONER

## 2017-07-28 PROCEDURE — 84100 ASSAY OF PHOSPHORUS: CPT | Performed by: STUDENT IN AN ORGANIZED HEALTH CARE EDUCATION/TRAINING PROGRAM

## 2017-07-28 PROCEDURE — 94003 VENT MGMT INPAT SUBQ DAY: CPT

## 2017-07-28 PROCEDURE — 83735 ASSAY OF MAGNESIUM: CPT | Performed by: STUDENT IN AN ORGANIZED HEALTH CARE EDUCATION/TRAINING PROGRAM

## 2017-07-28 PROCEDURE — 25000132 ZZH RX MED GY IP 250 OP 250 PS 637: Performed by: TRANSPLANT SURGERY

## 2017-07-28 PROCEDURE — 74000 XR ABDOMEN PORT F1 VW: CPT

## 2017-07-28 PROCEDURE — 25000125 ZZHC RX 250: Performed by: TRANSPLANT SURGERY

## 2017-07-28 PROCEDURE — 25000128 H RX IP 250 OP 636: Performed by: NURSE PRACTITIONER

## 2017-07-28 PROCEDURE — 85025 COMPLETE CBC W/AUTO DIFF WBC: CPT | Performed by: STUDENT IN AN ORGANIZED HEALTH CARE EDUCATION/TRAINING PROGRAM

## 2017-07-28 PROCEDURE — 25000125 ZZHC RX 250: Performed by: SURGERY

## 2017-07-28 PROCEDURE — 40000275 ZZH STATISTIC RCP TIME EA 10 MIN

## 2017-07-28 PROCEDURE — 36415 COLL VENOUS BLD VENIPUNCTURE: CPT | Performed by: NURSE PRACTITIONER

## 2017-07-28 PROCEDURE — 25000132 ZZH RX MED GY IP 250 OP 250 PS 637: Performed by: SURGERY

## 2017-07-28 RX ORDER — OXYCODONE HCL 5 MG/5 ML
5-10 SOLUTION, ORAL ORAL
Status: DISCONTINUED | OUTPATIENT
Start: 2017-07-28 | End: 2017-08-09

## 2017-07-28 RX ORDER — TACROLIMUS 1 MG/1
1 CAPSULE ORAL
Status: DISCONTINUED | OUTPATIENT
Start: 2017-07-28 | End: 2017-07-28

## 2017-07-28 RX ORDER — NALOXONE HYDROCHLORIDE 0.4 MG/ML
.1-.4 INJECTION, SOLUTION INTRAMUSCULAR; INTRAVENOUS; SUBCUTANEOUS
Status: DISCONTINUED | OUTPATIENT
Start: 2017-07-28 | End: 2017-08-27

## 2017-07-28 RX ORDER — ALBUMIN (HUMAN) 12.5 G/50ML
25 SOLUTION INTRAVENOUS EVERY 6 HOURS
Status: COMPLETED | OUTPATIENT
Start: 2017-07-28 | End: 2017-07-29

## 2017-07-28 RX ADMIN — TACROLIMUS 1 MG: 5 CAPSULE ORAL at 13:05

## 2017-07-28 RX ADMIN — HUMAN INSULIN 3 UNITS/HR: 100 INJECTION, SOLUTION SUBCUTANEOUS at 23:22

## 2017-07-28 RX ADMIN — PANTOPRAZOLE SODIUM 40 MG: 40 TABLET, DELAYED RELEASE ORAL at 07:54

## 2017-07-28 RX ADMIN — MICAFUNGIN SODIUM 100 MG: 10 INJECTION, POWDER, LYOPHILIZED, FOR SOLUTION INTRAVENOUS at 13:04

## 2017-07-28 RX ADMIN — HUMAN INSULIN 1 UNITS/HR: 100 INJECTION, SOLUTION SUBCUTANEOUS at 04:19

## 2017-07-28 RX ADMIN — HUMAN INSULIN 4 UNITS/HR: 100 INJECTION, SOLUTION SUBCUTANEOUS at 15:34

## 2017-07-28 RX ADMIN — ACETAMINOPHEN 650 MG: 325 TABLET, FILM COATED ORAL at 00:36

## 2017-07-28 RX ADMIN — ACETAMINOPHEN 650 MG: 325 TABLET, FILM COATED ORAL at 04:19

## 2017-07-28 RX ADMIN — SODIUM CHLORIDE, POTASSIUM CHLORIDE, SODIUM LACTATE AND CALCIUM CHLORIDE: 600; 310; 30; 20 INJECTION, SOLUTION INTRAVENOUS at 02:14

## 2017-07-28 RX ADMIN — ALBUMIN (HUMAN) 25 G: 12.5 SOLUTION INTRAVENOUS at 21:53

## 2017-07-28 RX ADMIN — Medication 1 PACKET: at 07:54

## 2017-07-28 RX ADMIN — ACETAMINOPHEN 650 MG: 325 TABLET, FILM COATED ORAL at 16:31

## 2017-07-28 RX ADMIN — HEPARIN SODIUM 5000 UNITS: 5000 INJECTION, SOLUTION INTRAVENOUS; SUBCUTANEOUS at 16:16

## 2017-07-28 RX ADMIN — HEPARIN SODIUM 5000 UNITS: 5000 INJECTION, SOLUTION INTRAVENOUS; SUBCUTANEOUS at 00:36

## 2017-07-28 RX ADMIN — MEROPENEM 1 G: 1 INJECTION, POWDER, FOR SOLUTION INTRAVENOUS at 14:16

## 2017-07-28 RX ADMIN — Medication 80 MG: at 07:54

## 2017-07-28 RX ADMIN — HYDROMORPHONE HYDROCHLORIDE 0.5 MG: 1 INJECTION, SOLUTION INTRAMUSCULAR; INTRAVENOUS; SUBCUTANEOUS at 00:36

## 2017-07-28 RX ADMIN — Medication 1 PACKET: at 14:17

## 2017-07-28 RX ADMIN — ALBUMIN (HUMAN) 25 G: 12.5 SOLUTION INTRAVENOUS at 10:17

## 2017-07-28 RX ADMIN — OXYCODONE HYDROCHLORIDE 5 MG: 5 SOLUTION ORAL at 16:31

## 2017-07-28 RX ADMIN — POTASSIUM CHLORIDE 20 MEQ: 1.5 POWDER, FOR SOLUTION ORAL at 20:31

## 2017-07-28 RX ADMIN — TACROLIMUS 1 MG: 5 CAPSULE ORAL at 20:31

## 2017-07-28 RX ADMIN — Medication 1 PACKET: at 20:33

## 2017-07-28 RX ADMIN — HYDROMORPHONE HYDROCHLORIDE 0.5 MG: 1 INJECTION, SOLUTION INTRAMUSCULAR; INTRAVENOUS; SUBCUTANEOUS at 02:14

## 2017-07-28 RX ADMIN — OXYCODONE HYDROCHLORIDE 5 MG: 5 SOLUTION ORAL at 10:16

## 2017-07-28 RX ADMIN — ACETAMINOPHEN 650 MG: 325 TABLET, FILM COATED ORAL at 13:03

## 2017-07-28 RX ADMIN — HEPARIN SODIUM 5000 UNITS: 5000 INJECTION, SOLUTION INTRAVENOUS; SUBCUTANEOUS at 07:54

## 2017-07-28 RX ADMIN — GANCICLOVIR SODIUM 250 MG: 500 INJECTION, POWDER, LYOPHILIZED, FOR SOLUTION INTRAVENOUS at 17:57

## 2017-07-28 RX ADMIN — HYDROMORPHONE HYDROCHLORIDE 0.5 MG: 1 INJECTION, SOLUTION INTRAMUSCULAR; INTRAVENOUS; SUBCUTANEOUS at 04:19

## 2017-07-28 RX ADMIN — POTASSIUM CHLORIDE 20 MEQ: 1.5 POWDER, FOR SOLUTION ORAL at 05:46

## 2017-07-28 RX ADMIN — ACETAMINOPHEN 650 MG: 325 TABLET, FILM COATED ORAL at 23:27

## 2017-07-28 RX ADMIN — HEPARIN SODIUM 5000 UNITS: 5000 INJECTION, SOLUTION INTRAVENOUS; SUBCUTANEOUS at 23:22

## 2017-07-28 RX ADMIN — MULTIVIT AND MINERALS-FERROUS GLUCONATE 9 MG IRON/15 ML ORAL LIQUID 15 ML: at 07:54

## 2017-07-28 RX ADMIN — POTASSIUM CHLORIDE 20 MEQ: 1.5 POWDER, FOR SOLUTION ORAL at 00:36

## 2017-07-28 RX ADMIN — ALBUMIN (HUMAN) 25 G: 12.5 SOLUTION INTRAVENOUS at 00:37

## 2017-07-28 RX ADMIN — OXYCODONE HYDROCHLORIDE 5 MG: 5 SOLUTION ORAL at 20:31

## 2017-07-28 RX ADMIN — Medication 2 G: at 00:36

## 2017-07-28 RX ADMIN — MEROPENEM 1 G: 1 INJECTION, POWDER, FOR SOLUTION INTRAVENOUS at 02:12

## 2017-07-28 RX ADMIN — GANCICLOVIR SODIUM 250 MG: 500 INJECTION, POWDER, LYOPHILIZED, FOR SOLUTION INTRAVENOUS at 05:46

## 2017-07-28 RX ADMIN — ALBUMIN (HUMAN) 25 G: 12.5 SOLUTION INTRAVENOUS at 16:16

## 2017-07-28 RX ADMIN — DAPTOMYCIN 750 MG: 500 INJECTION, POWDER, LYOPHILIZED, FOR SOLUTION INTRAVENOUS at 16:22

## 2017-07-28 ASSESSMENT — PAIN DESCRIPTION - DESCRIPTORS
DESCRIPTORS: SHARP
DESCRIPTORS: SHARP

## 2017-07-28 NOTE — PROGRESS NOTES
"Colorectal Surgery Progress Note      Subjective:  Intubated.  Awake and alert this AM.  Nods head to pain being controlled.  Continues to spike fevers as high as 103.5.     Vitals:  Vitals:    07/28/17 0400 07/28/17 0434 07/28/17 0500 07/28/17 0600   BP: 148/75  141/73 134/74   BP Location: Left arm      Pulse:       Resp: 14      Temp: 99.4  F (37.4  C)      TempSrc: Axillary      SpO2: 99% 98% 97% 98%   Weight:       Height:         I/O:  I/O last 3 completed shifts:  In: 3814.4 [I.V.:2794.4; NG/GT:540]  Out: 5510 [Urine:1925; Emesis/NG output:200; Drains:1835; Stool:1550]    Physical Exam:  Gen: Awake, alert, calm.  Nodding yes/no to questions.   Pulm: intubated  Abd: Soft, mildly distended but much less compared to prior   Incision dressed   Ileostomy - pink, viable, red rubber via stoma, +succus.    Mucus fistula and incision dressed   TABITHA drain serosang output  Ext:      BMP  Recent Labs  Lab 07/28/17 0410 07/27/17 2110 07/27/17 0410 07/26/17 2243 07/26/17  0431    143 142 140  < > 140   POTASSIUM 3.8 3.4 3.5 3.5  < > 3.5   CHLORIDE 112* 111* 108 108  --  108   CO2 18* 23 20 21  --  23   BUN 71* 77* 72* 75*  --  74*   CR 2.15* 2.45* 2.82* 2.76*  --  3.09*   * 66* 164* 151*  < > 129*   MAG 2.5* 2.0 2.0  --   --  1.9   PHOS 6.6* 6.1* 5.8*  --   --  4.1   < > = values in this interval not displayed.  CBC  Recent Labs  Lab 07/28/17  0410 07/27/17 0410 07/26/17 2243 07/26/17 2133 07/26/17 2017 07/26/17  0431   WBC 9.3 10.0 10.3  --   --   --   --  7.7   HGB 8.0* 9.3* 9.1* 9.5*  < > 9.8*  Canceled, Test credited Incorrect specimen type  < > 5.9*   HCT 24.4* 27.7* 27.5*  --   --   --   --  17.6*   * 144* 136*  --   --  138*  --  109*   < > = values in this interval not displayed.      ASSESSMENT: This is a 51 year old male with complex PMH of CASTAÑEDA cirrhosis s/p Liver transplant Mar 2017.  Was admitted on 7/4/2014 with abdominal pain, sepsis, CT at OSH showed \"right colonic wall " "thickening suggesting colitis\" underwent Ex-Lap and washout for neutropenic colitis, intra-op was noted to have inflammed cecum and ascending colon with murky fluid.  Abdomen was left open at the time and was closed on 7/5/2017.  Some concern for CMV colitis with low count CMV viremia/GI PTLD and subsequently underwent colonoscopy on 7/21:  No colitis was seen on visualized portions of mucosa.  Exam sub-optimal as colon filled with solid stool.  Random biopsies obtained.  On 7/25/2017 pt became more tachycardic, increasing O2 needs and altered, pt was intubated, hypotension responsive to IVFs, worsening kidney function.  Has not required pressors.  CT was obtained which showed a new large heterogeneous right sided retroperitoneal gas and air tracking along duodenum.  No contrast extravasation.  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  Despite conservative management with bowel rest and IV abx, pt continued to spike fevers.  Now s/p Exploratory laparotomy, right angelique-colectomy, end ileostomy, mucus fistula, partial omentectomy on 7/26.  No perforation noted, inflammation of right colon.     - defer management to SICU & transplant  - rec leaving TABITHA drain until POD#5  - can remove red rubber via stoma now that he has output  - intra-op peritoneal fluid cultures - NGTD.  Path pending.   - WOCN for new ileostomy & mucus fistula  - now with ileostomy, do not recommend bowel regimens (miralax, senna-docusate, dulcolax, etc)  - OK for DVT ppx from colorectal perspective    Iqra Acosta PA-C   Colon and Rectal Surgery  1254     Patient was seen and discussed with Fellow, Dr. Solano    "

## 2017-07-28 NOTE — PLAN OF CARE
Problem: Goal Outcome Summary  Goal: Goal Outcome Summary  Outcome: Improving  D/I/A: Lethargic overnight, but arouses easily and nods/shakes head appropriately to questions. Pt is getting fentanyl at 50mcg/hr for with PRN bumps of dilaudid for pain. Tmax 102 axillary, SICU team notified, no new orders placed. Last pan cultured on 7/25. Given PRN tylenol, ice packs and cool fan to help with temp. HR  sinus tach, rare PAC noted. -140s. CVP 6. On 40% fiO2, SIMV vent settings overnight. Continues to have copious amounts of secretions from ETT and orally, thick/white, requiring frequent suctioning. TF increased to 20cc/hr, next increase by 10cc/hr at 1000. Good UOP from vazquez, 50-100cc/hr. Good output from ileostomy, bilious drainage. Red Meyers in ileostomy site was found at start of shift to be outside of the stoma site, laying in ileostomy bag. SICU team notified and saw pt at bedside, no concerns or new orders as ileostomy is working fine, will monitor. Continues to have soft/tan BMs from rectum. TABITHA bulb also continuing to have high amounts of serous drainage, MDs aware, getting scheduled albumin replacements. MIVF at 100cc/hr, Insulin at 3 units/hr.    K+ and Mag replaced overnight. PST started at 0600, pt tolerating well so far.     P: Continue with PST, maybe extubate later today. Will continue to monitor vitals and temp closely, notify SICU team with any concerns.     Problem: Restraint for Non-Violent/Non-Self-Destructive Behavior  Goal: Prevent/Manage Potential Problems  Maintain safety of patient and others during period of restraint.  Promote psychological and physical wellbeing.  Prevent injury to skin and involved body parts.  Promote nutrition, hydration, and elimination.   Outcome: No Change  Continues to occasionally reach for ETT, lines, so restraints remain in place. Will continue to monitor and reassess need, remove when able.

## 2017-07-28 NOTE — PROGRESS NOTES
Immunosuppression Note:    Camacho Bhagat is a 51 year old male who is seen today  for immunosuppression management     I, Jovan Tran MD, I have examined the patient with the resident/PA/Fellow, discussed and agree with the note and findings.  I have reviewed today's vital signs, medications, labs and imaging. I reviewed the immunosuppression medications and levels. 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 ED for evaluation and management of sepsis secondary to colitis, taken to OR for initial exploratory laparotomy with findings of typhlitis in the right colon, wound left open with wound vac in place for reexploration and interval closure on 7/5/2017. Concern for CMV colitis with low count CMV viremia/GI PTLD and subsequently underwent colonoscopy on 7/21, random biopsies obtained.  On 7/25/2017 patient had respiratory distress, abdominal compartment syndrome and EJ with oliguria. Broad spectrum antibiotics were started.  He was intubated and had a paracentesis with 5L removed. He did not required pressors.  CT was obtained which showed a new large heterogeneous right sided retroperitoneal gas and air tracking along duodenum.  No contrast extravasation.  No intraperitoneal air noted. Colorectal surgery was consulted. Initial conservative management with bowel rest and IV abx. Pt continued to spike fevers despite IV abx.  Now s/p Exploratory laparotomy, right angelique-colectomy, end ileostomy, mucus fistula, partial omentectomy on 7/26.    Graft Function: Good function. LFTs WNL. US liver unremarkable.  Immunosuppression Management:   CellCept discontinued (6/27/17) due to neutropenia.   Prograf goal ~8 due to single IS. 7/27 > 24 hr level < 3. Subtherapeutic  despite frequent dose increases, now with increased level. HELD for now due to EJ, will restart 1 mg BID today and check level on Sunday.   Cardiorespiratory: Acute respiratory distress with hypercapnia. Now extubated this AM.   CXR-pulmonary edema, moderate pleural effusions. CT scan chest, small b/l pleural effusions.  Hematology: Pancytopenia on admission, acute on chronic, secondary to medications (MMF, bactrim, valcyte). Improved with holding medications and neupogen. Possible due to CMV.  Started IV Ganciclovir 7/20.  Continue to hold MMF and bactrim.   -Anemia- HGB 9.3->8.0. Blood transfusion 1 unit 7/18, 4 units 7/26.   -Cytology ascites fluid, negative for malignancy   GI: Neutropenic colitis. Colonoscopy 7/21. Biopsy results showing resolving injury secondary to possible drug induced vs infectious.   -CT scan abd/pelvis, po contrast, showed a new large heterogeneous right sided retroperitoneal gas and air tracking along duodenum.  No contrast extravasation.  No intraperitoneal air noted. Colorectal surgery consulted.  Continued to spike high grade temperature despite IV antibiotics therefore patient taken to OR. s/p Exploratory laparotomy, right angelique-colectomy, end ileostomy, mucus fistula, partial omentectomy on 7/26. Path in process. Findings no neida perforation, phlegmon/inflammationright colon. WOCN consult for ostomy care.   -NPO. TF held at that time, now restarted, advancing to goal   Fluid/Electrolytes/Renal: EJ oliguric likely sec to high intraabdominal pressures and ischemic ATN sec to sepsis. Nephrology consulted. No indication for RRT presently. UO slightly increased. Cr elevated, 2.8->2.5->2.2  Endocrine: DM type 2. On insulin gtt.  Infectious disease:   -Severe sepsis, right colon phlegmon -Ax with meropenem/daptomycin/micafungin. S/p right angeliuqe-colectomy. Path pending. Fluid cx x 1 negative and 1 in process.  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  CMV discordent with CMV PCR  increasing. Continue IV ganciclovir. Follow CMV PCR weekly  -7/25 Ucx, Bcx and ascites cx negative to date  -ID consulting.   Neuro: Toxic metabolic encephalopathy secondary to infection, prolonged hospital stay.  Vascular: Right Arterial line clot 2/2 previous arterial line. Vascular consulted. Recommend ASA only. Some evidence of microvascular injury in digits (toes) this is most likely due to injury while patient was on pressor therapy and sepsis. Now with a third toe injury, vascular consulted again. US completed.  ECHO EF 60-65%. 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   Activity: PT/OT  Prophylaxis: DVT-Heparin SQ  Disposition: SICU.    Medical Decision Making: Medium  Admit 04132 (moderate level decision making)    PILLO/Fellow/Resident Provider: Luiza Wing, PAC [id number]    Faculty: Jovan Tran M.D.  __________________________________________________________________  Transplant History:.  3/4/2017 DD OLT with Dr. Tran (Liver), Postoperative day: 146     Interval History:   Overnight events: Tmax 103.5, no complaints, extubated  ROS:   A 10-point review of systems was negative except as noted above.    Meds:    tacrolimus  1 mg Oral BID IS     albumin human  25 g Intravenous Q6H     protein modular  1 packet Per Feeding Tube TID     ganciclovir (CYTOVENE) intermittent infusion  2.5 mg/kg (Dosing Weight) Intravenous Q12H     meropenem  1 g Intravenous Q12H     DAPTOmycin (CUBICIN) intermittent infusion  8 mg/kg (Dosing Weight) Intravenous Q24H     micafungin  100 mg Intravenous Q24H     aspirin  80 mg Oral Daily     multivitamins with minerals  15 mL Per Feeding Tube Daily     heparin  5,000 Units Subcutaneous Q8H     pantoprazole  40 mg Oral or Feeding Tube Daily     sodium chloride (PF)  3 mL Intracatheter Q8H       Physical Exam:     Admit Weight: 94.2 kg (207 lb 11.2 oz) (SCALE 2)    Current vitals:   /79  Pulse 90  Temp 99.4  F (37.4  C) (Axillary)  Resp 22   Ht 1.829 m (6')  Wt 99 kg (218 lb 4.1 oz)  SpO2 100%  BMI 29.6 kg/m2    Vital sign ranges:    Temp:  [98.6  F (37  C)-103.5  F (39.7  C)] 99.4  F (37.4  C)  Heart Rate:  [] 99  Resp:  [14-24] 22  BP: (125-186)/() 138/79  FiO2 (%):  [40 %] 40 %  SpO2:  [95 %-100 %] 100 %  Patient Vitals for the past 24 hrs:   BP Temp Temp src Heart Rate Resp SpO2   07/28/17 1200 - 99.4  F (37.4  C) Axillary - - -   07/28/17 1100 138/79 - - 99 22 100 %   07/28/17 1000 141/76 - - 96 18 100 %   07/28/17 0900 142/77 - - 96 - 99 %   07/28/17 0800 138/75 98.6  F (37  C) Axillary 93 21 100 %   07/28/17 0700 128/71 - - 94 - 99 %   07/28/17 0600 134/74 - - 97 - 98 %   07/28/17 0500 141/73 - - 101 - 97 %   07/28/17 0434 - - - 102 - 98 %   07/28/17 0400 148/75 99.4  F (37.4  C) Axillary 103 14 99 %   07/28/17 0300 141/77 - - 104 16 98 %   07/28/17 0229 - - - 104 - 99 %   07/28/17 0200 137/70 - - 106 - 96 %   07/28/17 0100 140/68 - - 115 19 98 %   07/28/17 0000 125/69 102  F (38.9  C) Axillary 114 21 100 %   07/27/17 2300 132/67 - - 115 - 98 %   07/27/17 2230 135/69 - - 125 - 100 %   07/27/17 2200 (!) 168/102 - - 117 18 100 %   07/27/17 2100 156/84 - - 112 18 99 %   07/27/17 2000 142/81 100.4  F (38  C) Axillary 110 19 97 %   07/27/17 1900 135/75 - - 111 23 98 %   07/27/17 1800 128/76 - - 118 23 97 %   07/27/17 1700 158/88 - - 125 22 95 %   07/27/17 1642 - - - - - 100 %   07/27/17 1639 (!) 186/107 - - 130 22 -   07/27/17 1600 157/87 103.5  F (39.7  C) Axillary 121 24 100 %     General Appearance: NAD  Skin: normal, warm, dry  Heart: RRR  Lungs: Extubated this AM  Abdomen:soft, less distended. Midline incision, c/d/i. Chevron incision well healed.   : vazquez is present, small amount UO.   Extremities: BLE mild edema.  Neurologic: A&O   CVC    Data:   CMP    Recent Labs  Lab 07/28/17  0410 07/27/17  2110 07/27/17  0410  07/26/17  2133 07/26/17  2046  07/25/17  1651 07/25/17  1335    143 142  < > 139 139  < > 138  --     POTASSIUM 3.8 3.4 3.5  < > 3.5 3.5  < > 4.4  --    CHLORIDE 112* 111* 108  < >  --   --   < > 106  --    CO2 18* 23 20  < >  --   --   < > 24  --    * 66* 164*  < > 155* 151*  < > 176*  --    BUN 71* 77* 72*  < >  --   --   < > 79*  --    CR 2.15* 2.45* 2.82*  < >  --   --   < > 2.90*  --    GFRESTIMATED 32* 28* 24*  < >  --   --   < > 23*  --    GFRESTBLACK 39* 34* 29*  < >  --   --   < > 28*  --    JACOB 7.7* 7.9* 7.9*  < >  --   --   < > 8.0*  --    ICAW  --   --   --   --  4.4 4.3*  < >  --   --    MAG 2.5* 2.0 2.0  --   --   --   < > 1.9  --    PHOS 6.6* 6.1* 5.8*  --   --   --   < > 4.8*  --    ALBUMIN  --   --  2.4*  --   --   --   --  2.0*  --    BILITOTAL  --   --  1.3  --   --   --   --  0.6  --    ALKPHOS  --   --  143  --   --   --   --  242*  --    AST  --   --  27  --   --   --   --  61*  --    ALT  --   --  27  --   --   --   --  50  --    FAMY  --   --   --   --   --   --   --   --  8   < > = values in this interval not displayed.  CBC    Recent Labs  Lab 07/28/17  0410 07/27/17  0410   HGB 8.0* 9.3*   WBC 9.3 10.0   * 144*     COAGS    Recent Labs  Lab 07/26/17  2243 07/26/17 2017 07/26/17  1620   INR 1.31* 1.31* 1.30*   PTT  --  39* 44*      Urinalysis  Recent Labs   Lab Test  07/28/17   1042  07/25/17   1205   06/14/17   1508   04/11/16   1345   COLOR  Yellow  Dark Yellow   < >   --    < >  Yellow   APPEARANCE  Slightly Cloudy  Cloudy   < >   --    < >  Clear   URINEGLC  Negative  70*   < >   --    < >  30*   URINEBILI  Negative  Negative   < >   --    < >  Negative   URINEKETONE  Negative  Negative   < >   --    < >  Negative   SG  1.012  1.014   < >   --    < >  1.016   UBLD  Trace*  Moderate*   < >   --    < >  Small*   URINEPH  5.0  5.0   < >   --    < >  5.0   PROTEIN  30*  100*   < >   --    < >  30*   NITRITE  Negative  Negative   < >   --    < >  Negative   LEUKEST  Negative  Trace*   < >   --    < >  Negative   RBCU  5*  >182*   < >   --    < >  1   WBCU  6*  5*   < >   --   "  < >  1   UTPG   --    --    --   1.55*   --   0.41*    < > = values in this interval not displayed.          7/21/2017   SPECIMEN(S):   A: Colon biopsy, ascending   B: Colon biopsy, descending     FINAL DIAGNOSIS:   A. COLON BIOPSY, ASCENDING:   - Colonic mucosa with no significant histologic abnormality   - Negative for intraepithelial lymphocytosis or deposition of   subepithelial thick collagenous band     B. COLON BIOPSY, DESCENDING:   - Crypt architecture distortion, consistent with healed prior injury   - Negative for active inflammation or dysplasia   - Negative for intraepithelial lymphocytosis or deposition of   subepithelial thick collagenous band   - Please, see comment     COMMENT:   Specimen B, \"descending colon\" features lamina propria edema, slight   variation in crypt sizes and shapes and occasional crypt drop-out.  This   may indicate a resolving injury which could be drug-induced or   infectious.     CT scan 7/25/2017:   IMPRESSION:   1. New large heterogeneous area of right-sided retroperitoneal gas  which is highly concerning for an infectious process. Given the  history of prior neutropenic colitis and biopsies, there could also be  a component of stool from a ruptured viscus, despite the lack of  surrounding bowel loops and no extraluminal oral contrast. Surgical  consultation is recommended.      2. Bibasilar atelectasis versus consolidation with small bilateral  pleural effusions.     3. Extensive mesenteric and soft tissue edema with large volume  ascites. Numerous mesenteric and retroperitoneal lymph nodes which are  presumably reactive.     4. Postsurgical changes of liver transplant.     [Urgent Result: Retroperitoneal gas]     Finding was identified on 7/25/2017 5:34 PM.      Dr. Santoyo was contacted by Dr. Atkins at 7/25/2017 5:37 PM and  verbalized understanding of the urgent finding.      I have personally reviewed the examination and initial interpretation  and I agree with the " findings.     LUCI HOROWITZ MD

## 2017-07-28 NOTE — PROGRESS NOTES
[facility]    Transplant Infectious Diseases Inpatient Progress note      Camacho Bhagat MRN# [id number]   YOB: 1965 Age: 51 year old   Date of Admission: 7/4/2017  4:45 AM  Transplant: 3/4/2017 (Liver), Postoperative day: 146            Recommendations:   1. Continue broad spectrum meropenem/daptomycin/micafungin.   2. Continue GCV IV but at dose to 2.5 mg/kg q12 hr. (higher than recommended per package insert for possible resistance)  3. Agree with blood cx.   4. Low threshold to repeat CT abdomen and pelvis.   5. Will follow on quantitative CMV PCR and CMV resistance test.   6. Repeat quantitative CMV PCR 8/3/2017.    7. EBV PCR every other week (next on 8/1/2017).  8. CPK weekly (next 8/1/2017).     Critically ill patient.         Summary of Presentation:   Transplants:  3/4/2017 (Liver), Postoperative day:  146     This patient is a 51 year old male with CASTAÑEDA s/p OLT in 3/2017. Basilixima for induction.   MMF was held due to neutropenia and multiple infections upon admission. TAC was held 7/25/2017 for perforated viscus.      Presented with colitis, s/p exploratory laparotomy. Attributed to neutropenic colitis.   Then started to develop CMV viremia (primary infection)  Now with intra-abdominal abscess and severe sepsis.         Active Problems and Infectious Diseases Issues:   1. Severe sepsis picture 7/25/2017.   2. Intra-abdominal abscess s/p evacuation and possible perforated viscus.    Underwent exp lap with hemicolectomy on 7/26/2017.   Intraoperatively no perforation was noted, right colitis was noted with intraabdominal abscess.   Will follow on pathology.   The persistent fever is likely due to persistent inflammatory process in the abdomen, it will eventually subside. If persists longer than expected, I would have low threshold to repeat CT abdomen.  I doubt the need for sputum cx since it may reveal contamination or colonization in the respiratory tract. It is  unlikely that the patient has pneumonia while his respiratory status is improving. Continue broad spectrum ABx with meropenem/daptomycin/micafungin.   Check weekly CPK while on daptomycin.     3. Neutropenic vs CMV vs PTLD colitis (without ischemia per exp lap upon admission)   4. CMV primary infection (donor derived)  5. EBV primary infection (donor derived)  The initial colitis at presentation on admission was thought to be due to neutropenia-induced colitis. Later during admission CMV viremia ensued and the possibility of CMV colitis was entertained, the CMV viremia was at low level and colonoscopy was recommended by ID to ascertain whether the patient has end organ damage with CMV colitis. Colonoscopy done on 7/21/2017 with poor prep and negative random biopsies.   Finished 14 days of ertapenem on 7/18/2017 for colitis. The neutropenia was attributed to drugs (MMF/bactrim/VGCV, all were DCed prior to presentation).   Now with perforated viscus and peritoneal gas on broad spectrum ABx.     The primary CMV infection is donor derived following discontinuation of VGCV and in part due to acute illness as well.   Whether the patient also has CMV colitis is not clear, random colon biopsies were negative. Will wait for the pathology from the hemicolectomy procedure.   The patient needs to be treated for primary CMV infection anyway due to CMV viral load above the threshold of 1500 IU/mL.   IV GCV was initiated 7/20/2017, the dose was increased to high dose therapy since the viremia occurred while on and off VGCV for ppx and potential resistance is possible.   We should continue to follow weekly CMV PCR. I will check CMV PCR today with CMV resistance panel.     Random colon biopsies were also not suggestive of PTLD.   Continue to monitor EBV PCR every other week.   Ascitic fluid cytology and leukemia/lymphoma studies negative.   MRI of brain not suggestive of CNS PTLD.   The pathology of the hemicolectomy should help  ruling out/in PTLD.     6. EJ   Improving.   Due to severe sepsis.   We are adjusting the doses of ABx and antiviral accordingly.     7. Encephalopathy.   Due to sepsis.   With negative MRI of brain for lesions.     8. 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    These are likely all colonizers or contaminants.   The patient was started on daptomycin for severe sepsis and colonization with VRE.     9. Staphylococcus capitis in ascitic fluid 7/5/2017   Contamination.     10. Rhinovirus in BAL 7/15/2017  Likely to be due to chronic shedding since the patient's main presentation was mostly abdominal not respiratory.      Other Infectious Disease issues include  - PCP prophylaxis: bactrim DCed in June of 2017 due to neutropenia.   - Serostatus: CMV D+/R-, EBV D+/R-, HSV1?/2?, VZV ?   Now he's on GCV IV.   - Immunization status: up-to-date  - Gamma globulin status: >1660 as of 7/24/2017.       Attestation:  I interviewed the patient and obtained history from the patient, and by reviewing the patient's chart including outside records, microbiological data, and radiological data. All data are summarized in this notes.  Naseem Figueroa   Pager: [phone]  07/28/2017        Interim History:  7/28/2017: extubated, follows commands and answers questions appropriately. C/o abdominal pain, no other complaints.     7/21/2017: Underwent colonoscopy but with poor prep, it was poor study. The patient started to spike intermittent low grade fevers.    7/25/2017: Patient is obtunded, shallow breathing. Looking septic. Was transferred to SICU and was intubated.   7/26/2017: underwent exp lap with right hemicolectomy, end ileostomy, and mucus fistula.     HPI:  In summary:  CASTAÑEDA s/p OLT in 3/2017.   Presented with abdominal pain and underwent exploratory laparotomy which was not c/w with ischemia. It was felt to be related to neutropenia.   The  patient has neutropenia since June of 2017 attributed to immunosuppressive therapy/bactrim/VGCV.   Bactrim DCed June of 2017 and VGCV was DCed at unknown tome.     The patient was started on broad spectrum ABx with persistent fever.   Bronchoscopy done 7/12/2017 grew single colony of VRE which was considered a colonizer.   Ascitic fluid checked on 7/5/2017 and grew S capitis considered a contaminant.   Repeat BAL on 7/15/2017 and repeat paracentesis on 7/7/14/2017 were unremarkable for growth but the ascitic fluid was exudative.     The course was complicated by thrombosis of the right radial artery with ischemia to to the tip of the right index finger.   He also has ischemia to the right hallux and second toe.     The patient finished a course of ertapenem, his mental status started to improve.   CMV PCR became detected at low level and workup for possible CMV colitis was initiated. Colonoscopy was with poor prep and random biopsies were negative for colitis including CMV colitis. GCV was initiated on 7/20/2017 (repeat CMV PCR was around 1900 IU/mL).     The patient started to spike low grade fever on 7/21/2017, on 7/25/2017 he suffered from acute respiratory distress and became obtunded with distended abdomen. He was transferred to ICU and was intubated. The patient was started on daptomycin/meropenem/micafungin. GCV dose was increased   CT c/a/p showed large RUQ intraabdominal extraluminal gas with possible feculent material.   He was taken to OR on 7/26/2017 and underwent exp lap with right hemicolectomy, end ileostomy, and mucus fistula.         ROS:  As mentioned in the interim history otherwise negative by reviewing central and peripheral neurological systems, respiratory system, cardiac system, GI system,  system, musculoskeletal, skin, allergy, and lymphatics.                  Pysical Examination:  Temp: 99.4  F (37.4  C) Temp src: Axillary BP: 138/79   Heart Rate: 99 Resp: 22 SpO2: 100 % O2 Device: (S)  Nasal cannula (Extubated) Oxygen Delivery: 4 LPM  Vitals:    07/19/17 0936 07/24/17 0254 07/25/17 0900 07/26/17 0400   Weight: 107 kg (235 lb 12.8 oz) 106.9 kg (235 lb 11.2 oz) 112 kg (246 lb 14.4 oz) 106.2 kg (234 lb 2.1 oz)    07/27/17 0400   Weight: 99 kg (218 lb 4.1 oz)     Constitutional: lying in bed in no apparent distress, answers questions and follows commands.    CVS: tachycardic, normal S1/S2, no murmur.   Lungs: upper airway coarse BS bilaterally anteriorly, no accessory muscle use.   Abdomen: distended and tender, no masses, no bruit, normal BS, midline wound is intact with no dehiscence or erythema and no drainage, RLQ ileostomy and proximal midline wound with mucus fistula.   Extremities: no pitting edema of bilateral lower extremities, ischemic right hallux and right second toe, also ischemic tip of left second toe, and right index, no ulcers.   Genitalia: vazquez catheter in place.   Skin: as the exam of the extremities and the abdomen, no induration, fluctuation or discharge,and no rash       Medications:  Medications that Require Transfusion:     lactated ringers 10 mL/hr at 07/28/17 0956     insulin (regular) Stopped (07/28/17 0800)     IV fluid REPLACEMENT ONLY 25 mL/hr at 07/20/17 0430   Scheduled Medications:     tacrolimus  1 mg Oral BID IS     albumin human  25 g Intravenous Q6H     protein modular  1 packet Per Feeding Tube TID     ganciclovir (CYTOVENE) intermittent infusion  2.5 mg/kg (Dosing Weight) Intravenous Q12H     meropenem  1 g Intravenous Q12H     DAPTOmycin (CUBICIN) intermittent infusion  8 mg/kg (Dosing Weight) Intravenous Q24H     micafungin  100 mg Intravenous Q24H     aspirin  80 mg Oral Daily     multivitamins with minerals  15 mL Per Feeding Tube Daily     heparin  5,000 Units Subcutaneous Q8H     pantoprazole  40 mg Oral or Feeding Tube Daily     sodium chloride (PF)  3 mL Intracatheter Q8H       Laboratory Data:   No results found for: ACD4    Inflammatory Markers     Recent Labs   Lab Test  07/05/17   1810  03/05/17   0358  11/23/16   0813  11/16/16   0706  11/15/16   1039  11/07/16   0759  11/03/16   0949  11/01/16   0853   08/15/11   1151  04/11/11   1209  01/03/11   1246  02/15/10   1232   SED   --    --   45*   --    --    --    --    --    --   11  14  17*  25*   CRP  354.0  20.0*  58.0*  59.1*  49.8*  66.0*  140.0*  150.0*   < >  11.1*  9.0*  11.7*  17.5*    < > = values in this interval not displayed.       Immune Globulin Studies     Recent Labs   Lab Test  07/24/17   0705  08/15/11   1243   IGG  1660*  1160   IGG1   --   734   IGG2   --   294   IGG3   --   7*   IGG4   --   59       Metabolic Studies       Recent Labs   Lab Test  07/28/17   0410  07/27/17   2110  07/27/17   0410  07/26/17   2243  07/26/17   2133  07/26/17   2046   07/26/17   0522  07/26/17   0431  07/25/17   1651  07/25/17   0945   07/06/17   0316   NA  144  143  142  140  139  139   < >   --   140  138  137   < >  143   POTASSIUM  3.8  3.4  3.5  3.5  3.5  3.5   < >   --   3.5  4.4  4.0   < >  5.0   CHLORIDE  112*  111*  108  108   --    --    --    --   108  106  104   < >  116*   CO2  18*  23  20  21   --    --    --    --   23  24  26   < >  18*   ANIONGAP  13  9  14  11   --    --    --    --   9  8  7   < >  9   BUN  71*  77*  72*  75*   --    --    --    --   74*  79*  77*   < >  62*   CR  2.15*  2.45*  2.82*  2.76*   --    --    --    --   3.09*  2.90*  2.60*   < >  2.75*   GFRESTIMATED  32*  28*  24*  24*   --    --    --    --   21*  23*  26*   < >  24*   GLC  161*  66*  164*  151*  155*  151*   < >   --   129*  176*  382*   < >  139*   A1C   --    --    --    --    --    --    --    --    --    --    --    --   Canceled, Test credited   Below Assay Range  NOTIFIED LEONARD ONEILL AT 0538 ON 7/6/17 BY CHRISSY     JACOB  7.7*  7.9*  7.9*  7.6*   --    --    --    --   7.9*  8.0*  7.6*   < >  6.9*   PHOS  6.6*  6.1*  5.8*   --    --    --    --    --   4.1  4.8*   --    < >  5.5*   MAG  2.5*  2.0  2.0    --    --    --    --    --   1.9  1.9   --    < >  2.1   LACT   --    --    --   1.0   --    --    --   0.6*   --   2.3*   --    < >  1.5   CKT   --    --    --    --    --    --    --    --    --    --   40   --    --     < > = values in this interval not displayed.       Hepatic Studies    Recent Labs   Lab Test  07/27/17   0410  07/25/17   1651  07/25/17   0945  07/25/17   0017  07/24/17   0705  07/23/17   0625  07/21/17   0615   07/11/17   0328   07/05/17   1810   BILITOTAL  1.3  0.6  0.5   --   0.4  0.3  0.5   < >  0.5   < >   --    ALKPHOS  143  242*  317*   --   264*  224*  297*   < >  158*   < >   --    ALBUMIN  2.4*  2.0*  1.9*   --   1.7*  1.9*  1.9*   < >  1.8*   < >   --    AST  27  61*  90*   --   86*  63*  75*   < >  34   < >   --    ALT  27  50  60   --   54  47  51   < >  27   < >   --    LDH   --    --    --   258*   --    --    --    --    --    --   289*   GGT   --    --    --    --    --    --    --    --   58   --    --     < > = values in this interval not displayed.       Pancreatitis testing    Recent Labs   Lab Test  07/24/17   0705 07/17/17   0630  07/12/17   0342  07/10/17   0340  07/05/17   0346  03/05/17   0358  03/03/17   1458  02/21/17   1520  12/09/16   1159  02/08/16   1144   09/30/10   1734   AMYLASE   --    --    --    --    --   53  70   --    --    --    --   82   LIPASE   --    --    --    --    --   216   --   326  161   --    --   138   TRIG  303*  168*  114  177*  174*   --    --    --    --   99   < >   --     < > = values in this interval not displayed.       Hematology Studies      Recent Labs   Lab Test  07/28/17   0410  07/27/17   0410  07/26/17   2243  07/26/17   2133  07/26/17   2046  07/26/17 2017 07/26/17   0431  07/25/17   1651  07/25/17   0945  07/24/17   0705  07/23/17   0625   WBC  9.3  10.0  10.3   --    --    --    --   7.7  10.3  11.9*  6.4  4.9   ANEU  7.1  7.9   --    --    --    --    --   5.6   --   10.8*  3.7  2.3   ALYM  1.6  1.6   --    --    --     --    --   1.6   --   0.4*  2.3  2.1   ZINA  0.5  0.4   --    --    --    --    --   0.3   --   0.3  0.3  0.3   AEOS  0.1  0.1   --    --    --    --    --   0.1   --   0.1  0.1  0.1   HGB  8.0*  9.3*  9.1*  9.5*  9.6*  9.8*  Canceled, Test credited   Incorrect specimen type     < >  5.9*  7.5*  8.8*  7.8*  6.9*   HCT  24.4*  27.7*  27.5*   --    --    --    --   17.6*  23.4*  27.6*  24.8*  22.6*   PLT  128*  144*  136*   --    --   138*   --   109*  141*  168  126*  120*    < > = values in this interval not displayed.       Arterial Blood Gas Testing    Recent Labs   Lab Test  07/26/17 2243 07/26/17   2133  07/26/17   2046  07/26/17 2017 07/25/17   1746  07/25/17   1037  07/08/17   0902   PH  Incorrect specimen type  NOTTIED BY WOJCIECH DEWITT, NEW ORDER TO REPLACE.7/26/17 AT 2346 BY ZAINAB.  CORRECTED ON 07/26 AT 2350: PREVIOUSLY REPORTED AS 7.27     --    --   Canceled, Test credited   Incorrect specimen type    7.32*  7.14*  7.32*   PCO2  Incorrect specimen type  NOTTIED BY WOJCICEH DEWITT, NEW ORDER TO REPLACE.7/26/17 AT 2346 BY ZAINAB.  CORRECTED ON 07/26 AT 2350: PREVIOUSLY REPORTED AS 45     --    --   Canceled, Test credited   Incorrect specimen type    42  69*  35   PO2  Incorrect specimen type  NOTTIED BY WOJCIECH DEWITT, NEW ORDER TO REPLACE.7/26/17 AT 2346 BY ZAINAB.  CORRECTED ON 07/26 AT 2350: PREVIOUSLY REPORTED AS 53     --    --   Canceled, Test credited   Incorrect specimen type    81  103  96   HCO3  Incorrect specimen type  NOTTIED BY WOJCIECH DEWITT, NEW ORDER TO REPLACE.7/26/17 AT 2346 BY ZAINAB.  CORRECTED ON 07/26 AT 2350: PREVIOUSLY REPORTED AS 20     --    --   Canceled, Test credited   Incorrect specimen type    22  23  18*   O2PER  Incorrect specimen type  NOTTIED BY WOJCIECH DEWITT, NEW ORDER TO REPLACE.7/26/17 AT 2346 BY ZAINAB.  CORRECTED ON 07/26 AT 2350: PREVIOUSLY REPORTED AS 40    40  62  100  100.0  100  40.0  7L  30        Urine Studies     Recent Labs   Lab Test  07/28/17   1042  07/25/17   1205   07/19/17   1150  07/11/17   1105  07/06/17   1441   URINEPH  5.0  5.0  5.0  5.0  5.0   NITRITE  Negative  Negative  Negative  Negative  Negative   LEUKEST  Negative  Trace*  Negative  Negative  Trace*   WBCU  Pending  5*  2  <1  9*       Vancomycin Levels     Recent Labs   Lab Test  07/06/17   0316  10/28/16   1405   VANCOMYCIN  22.1  14.4       Tacrolimus levels    Invalid input(s): TACROLIMUS, TAC, TACR  Transplant Immunosuppression Labs Latest Ref Rng & Units 7/28/2017 7/27/2017 7/27/2017 7/27/2017 7/26/2017   Tacro Level 5.0 - 15.0 ug/L - - <3.0  Tacrolimus Reference Range   Kidney Transplant   Pediatric                      ug/L     0-3 months post transplant   10-12     3-6 months post transplant   8-10     6-12 months post transplant  6-8     >12 months post transplant   4-7   Adult     0-6 months post transplant   8-10     6-12 months post transplant  6-8     >12 months post transplant   4-6     >5 years post transplant     3-5   Heart Transplant   Pediatric     0-12 months post transplant  10-15     >12 months post transplant   5-10   Adult     0-3 months post transplant   10-15     3-6 months post transplant   8-12     6-12 months post transplant  6-12     >12 months post transplant   6-10   Lung Transplant     0-12 months post transplant  10-15     >12 months post transplant   8-12   Liver Transplant   Pediatric     0-3 months post transplant   10-15     3-6 months post transplant   8-10     >6 months post transplant    6-8   Adult     0-3 months post transplant   10-12     3-6 months post transplant   8-10     >6   months post transplant    6-8   Pancreas Transplant     0-6 months post transplant   8-10     >6 months post transplant    5-8   This test was developed and its performance characteristics determined by the   [facility],  Special Chemistry Laboratory. It has   not been cleared or approved by the FDA. The laboratory is regulated under CLIA   as qualified to perform  high-complexity testing. This test is used for clinical   purposes. It should not be regarded as investigational or for research.  (L) - -   Tacro Level - - - Not Provided - -   Creat 0.66 - 1.25 mg/dL 2.15(H) 2.45(H) - 2.82(H) 2.76(H)   BUN 7 - 30 mg/dL 71(H) 77(H) - 72(H) 75(H)   WBC 4.0 - 11.0 10e9/L 9.3 - - 10.0 10.3   Neutrophil % 76.6 - - 79.1 -   ANEU 1.6 - 8.3 10e9/L 7.1 - - 7.9 -       CSF testing     Recent Labs   Lab Test  06/11/09   0225   CWBC  1   CRBC  2   CGLU  104*   CTP  54         Microbiology:  Ascites  7/26/2017 (OR) negative to date    7/25/2017 negative to date.     7/5/2017 S capitis    BAL   7/15/2017 yeast and Rhinovirus     7/12/2017 single colony of VRE, C albicans/dubliniensis   Sputum  7/11/2017 VRE and Coag Neg Staph     Bcx   7/25/2017 negative to date.     7/15/2017 negative     Last check of C difficile  C Diff Toxin B PCR   Date Value Ref Range Status   10/27/2016  NEG Final    Negative  Negative: Clostridium difficile target DNA sequences NOT detected, presumed   negative for Clostridium difficile toxin B or the number of bacteria present   may be below the limit of detection for the test.   FDA approved assay performed using "[employer] GeneXpert real-time PCR.   A negative result does not exclude actual disease due to Clostridium difficile   and may be due to improper collection, handling and storage of the specimen or   the number of organisms in the specimen is below the detection limit of the   assay.         Virology:  CMV viral loads    Recent Labs   Lab Test  07/20/17   1427  07/18/17   0530  07/15/17   1553  07/10/17   0340  07/03/17   1032   CSPEC  Plasma  Plasma  Bronchoalveolar Lavage  EDTA PLASMA  Plasma, EDTA anticoagulant   CMVLOG  3.3*  3.0*  3.6*  2.2*  <2.1       CMV viral loads    Log IU/mL of CMVQNT   Date Value Ref Range Status   07/20/2017 3.3 (H) <2.1 [Log_IU]/mL Final   07/18/2017 3.0 (H) <2.1 [Log_IU]/mL Final   07/15/2017 3.6 (H) <2.1 [Log_IU]/mL Final    07/10/2017 2.2 (H) <2.1 [Log_IU]/mL Final   07/03/2017 <2.1 <2.1 [Log_IU]/mL Final   06/26/2017 Not Calculated <2.1 [Log_IU]/mL Final       CMV resistance testing  No lab results found.  No results found for: CMVCID, CMVFOS, CMVGAN    USCNS Invalid input(s): USCN, USCNS    USCNS No components found for: USCN, USCNS      No results found for: H6RES    EBV DNA Copies/mL   Date Value Ref Range Status   07/18/2017 [id number] (A) EBVNEG [Copies]/mL Final         Pathology   Colon biopsies 7/21/2017   A: Colon biopsy, ascending   B: Colon biopsy, descending   FINAL DIAGNOSIS:   A. COLON BIOPSY, ASCENDING:   - Colonic mucosa with no significant histologic abnormality   - Negative for intraepithelial lymphocytosis or deposition of   subepithelial thick collagenous band   B. COLON BIOPSY, DESCENDING:   - Crypt architecture distortion, consistent with healed prior injury   - Negative for active inflammation or dysplasia   - Negative for intraepithelial lymphocytosis or deposition of   subepithelial thick collagenous band     Cytology of ascitic fluid 7/25/2017   PERITONEAL FLUID, ASCITES:   -Negative for malignancy   Cytology of ascitic fluid 7/14/2017.   Peritoneal fluid, ascites:   - Negative for malignancy   - Acute and chronic inflammation present   Specimen Adequacy: Satisfactory for evaluation.   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   INTERPRETATION:   Ascites fluid:        Rare to absent viable B cells     COMMENT:   This specimen was collected on 7/14/17 but was not ordered/delivered to   lab/run until 7/18/17 - results should be interpreted with caution due   to low cellularity/viability.     Due to limited cellularity we were only able to analyze the B cells.   There is no immunophenotypic evidence of B cell non-Hodgkin lymphoma.   Neoplastic cells, including large cells, may not survive specimen   processing. This sample may not be representative. Final interpretation   requires correlation with morphologic and  clinical features.       Imaging:  CXR 7/25/2017 reviewed by myself   Impression:   1. Right IJ catheter tip projects over the superior cavoatrial  junction.  2. Mild cardiomegaly with small bilateral pleural effusions.  3. Bibasilar atelectasis versus consolidation.    CT c/a/p 7/25/2017 reviewed by myself.   IMPRESSION:   1. New large heterogeneous area of right-sided retroperitoneal gas  which is highly concerning for an infectious process. Given the  history of prior neutropenic colitis and biopsies, there could also be  a component of stool from a ruptured viscus, despite the lack of  surrounding bowel loops and no extraluminal oral contrast. Surgical  consultation is recommended.   2. Bibasilar atelectasis versus consolidation with small bilateral  pleural effusions.  3. Extensive mesenteric and soft tissue edema with large volume  ascites. Numerous mesenteric and retroperitoneal lymph nodes which are  presumably reactive.  4. Postsurgical changes of liver transplant.  CT c/a/p7/13/2017 Reviewed by myself.   IMPRESSION:   1. Improved moderate right-sided pleural effusion and resolution of  left-sided pleural effusion. There remain large areas of  atelectasis/consolidation in both lungs, including in the left lower  lobe despite resolution of left-sided pleural effusion. Superimposed  infectious process cannot be excluded.  2. Moderate-large amount of ascites increased from 7/8/2017, which  makes it difficult to evaluate for the presence or absence of  inflammatory change or infectious process in the abdomen or pelvis. No  intra-abdominal abscess.  3. Stable small presumed hematoma within the ventral abdominal wall  fat.  4. Splenomegaly.    MRI brain 7/20/2017.   Impression:  1. Significant image degradation due to motion artifact, with no  definite acute intracranial pathology. No abnormal contrast enhancing  intracranial lesions.  2. Mucosal thickening in the sphenoid and maxillary sinuses and left  greater  than right mastoid fluid are nonspecific in the setting of  recent intubation.    Naseem Figueroa  Pager: [phone]

## 2017-07-28 NOTE — PLAN OF CARE
Problem: Goal Outcome Summary  Goal: Goal Outcome Summary  OT4A: Recommend pt discharge to TCU. Pt limited by cognition, pain and post surgical precautions. Pt required Max A for bed mobility and Mod A to stand. Pt's tolerance for upright activity is quite low, VSS throughout session.

## 2017-07-28 NOTE — PLAN OF CARE
Problem: Goal Outcome Summary  Goal: Goal Outcome Summary  SLP: Orders received for bedside swallow evaluation.  Pt extubated this date.  Per best practice protocol, pt to be seen 24 hours post extubation.  ST to f/u with swallow evaluation tomorrow.

## 2017-07-28 NOTE — PLAN OF CARE
Problem: Restraint for Non-Violent/Non-Self-Destructive Behavior  Goal: Prevent/Manage Potential Problems  Maintain safety of patient and others during period of restraint.  Promote psychological and physical wellbeing.  Prevent injury to skin and involved body parts.  Promote nutrition, hydration, and elimination.   Outcome: Completed Date Met:  07/28/17  Restraints discontinued, pt understands not to pull at remaining lines.

## 2017-07-28 NOTE — PROGRESS NOTES
[facility], [place]  Procedure Note          Extubation:       Camacho Bhagat  MRN# [id number]   July 28, 2017, 8:31 AM         Patient extubated at: July 28, 2017, 8:23 AM   Supplemental Oxygen: Via nasal cannula at 4 liters per minute   Cough: The cough is strong   Secretion Mode: Able to clear   Secretion Amount: Moderate amount, thick and clear in color   Respiratory Exam:: Breath sounds: coarse crackles     Location: bilaterally   Skin Exam:: Patient color: natural   Patient Status: Currently appears comfortable   Arterial Blood Gasses:              Recorded by Judith Anderson

## 2017-07-29 ENCOUNTER — APPOINTMENT (OUTPATIENT)
Dept: SPEECH THERAPY | Facility: CLINIC | Age: 52
End: 2017-07-29
Attending: NURSE PRACTITIONER
Payer: COMMERCIAL

## 2017-07-29 ENCOUNTER — APPOINTMENT (OUTPATIENT)
Dept: PHYSICAL THERAPY | Facility: CLINIC | Age: 52
End: 2017-07-29
Attending: TRANSPLANT SURGERY
Payer: COMMERCIAL

## 2017-07-29 LAB
ABO + RH BLD: NORMAL
ABO + RH BLD: NORMAL
ANION GAP SERPL CALCULATED.3IONS-SCNC: 10 MMOL/L (ref 3–14)
ANION GAP SERPL CALCULATED.3IONS-SCNC: 8 MMOL/L (ref 3–14)
BASOPHILS # BLD AUTO: 0 10E9/L (ref 0–0.2)
BASOPHILS NFR BLD AUTO: 0.2 %
BLD GP AB SCN SERPL QL: NORMAL
BLOOD BANK CMNT PATIENT-IMP: NORMAL
BUN SERPL-MCNC: 64 MG/DL (ref 7–30)
BUN SERPL-MCNC: 65 MG/DL (ref 7–30)
CALCIUM SERPL-MCNC: 7.8 MG/DL (ref 8.5–10.1)
CALCIUM SERPL-MCNC: 7.9 MG/DL (ref 8.5–10.1)
CHLORIDE SERPL-SCNC: 116 MMOL/L (ref 94–109)
CHLORIDE SERPL-SCNC: 118 MMOL/L (ref 94–109)
CO2 SERPL-SCNC: 21 MMOL/L (ref 20–32)
CO2 SERPL-SCNC: 21 MMOL/L (ref 20–32)
CREAT SERPL-MCNC: 1.28 MG/DL (ref 0.66–1.25)
CREAT SERPL-MCNC: 1.54 MG/DL (ref 0.66–1.25)
DIFFERENTIAL METHOD BLD: ABNORMAL
EOSINOPHIL # BLD AUTO: 0.1 10E9/L (ref 0–0.7)
EOSINOPHIL NFR BLD AUTO: 2.1 %
ERYTHROCYTE [DISTWIDTH] IN BLOOD BY AUTOMATED COUNT: 16.8 % (ref 10–15)
GFR SERPL CREATININE-BSD FRML MDRD: 48 ML/MIN/1.7M2
GFR SERPL CREATININE-BSD FRML MDRD: 59 ML/MIN/1.7M2
GLUCOSE BLDC GLUCOMTR-MCNC: 108 MG/DL (ref 70–99)
GLUCOSE BLDC GLUCOMTR-MCNC: 108 MG/DL (ref 70–99)
GLUCOSE BLDC GLUCOMTR-MCNC: 120 MG/DL (ref 70–99)
GLUCOSE BLDC GLUCOMTR-MCNC: 130 MG/DL (ref 70–99)
GLUCOSE BLDC GLUCOMTR-MCNC: 134 MG/DL (ref 70–99)
GLUCOSE BLDC GLUCOMTR-MCNC: 145 MG/DL (ref 70–99)
GLUCOSE BLDC GLUCOMTR-MCNC: 145 MG/DL (ref 70–99)
GLUCOSE BLDC GLUCOMTR-MCNC: 149 MG/DL (ref 70–99)
GLUCOSE BLDC GLUCOMTR-MCNC: 149 MG/DL (ref 70–99)
GLUCOSE BLDC GLUCOMTR-MCNC: 154 MG/DL (ref 70–99)
GLUCOSE BLDC GLUCOMTR-MCNC: 154 MG/DL (ref 70–99)
GLUCOSE BLDC GLUCOMTR-MCNC: 155 MG/DL (ref 70–99)
GLUCOSE BLDC GLUCOMTR-MCNC: 161 MG/DL (ref 70–99)
GLUCOSE BLDC GLUCOMTR-MCNC: 162 MG/DL (ref 70–99)
GLUCOSE BLDC GLUCOMTR-MCNC: 171 MG/DL (ref 70–99)
GLUCOSE BLDC GLUCOMTR-MCNC: 175 MG/DL (ref 70–99)
GLUCOSE SERPL-MCNC: 113 MG/DL (ref 70–99)
GLUCOSE SERPL-MCNC: 151 MG/DL (ref 70–99)
GRAM STN SPEC: ABNORMAL
HCT VFR BLD AUTO: 24.8 % (ref 40–53)
HGB BLD-MCNC: 8.1 G/DL (ref 13.3–17.7)
IMM GRANULOCYTES # BLD: 0 10E9/L (ref 0–0.4)
IMM GRANULOCYTES NFR BLD: 0.6 %
LYMPHOCYTES # BLD AUTO: 1.5 10E9/L (ref 0.8–5.3)
LYMPHOCYTES NFR BLD AUTO: 23.6 %
Lab: ABNORMAL
MAGNESIUM SERPL-MCNC: 2.3 MG/DL (ref 1.6–2.3)
MAGNESIUM SERPL-MCNC: 2.5 MG/DL (ref 1.6–2.3)
MCH RBC QN AUTO: 28.9 PG (ref 26.5–33)
MCHC RBC AUTO-ENTMCNC: 32.7 G/DL (ref 31.5–36.5)
MCV RBC AUTO: 89 FL (ref 78–100)
MICRO REPORT STATUS: ABNORMAL
MONOCYTES # BLD AUTO: 0.2 10E9/L (ref 0–1.3)
MONOCYTES NFR BLD AUTO: 3.3 %
NEUTROPHILS # BLD AUTO: 4.4 10E9/L (ref 1.6–8.3)
NEUTROPHILS NFR BLD AUTO: 70.2 %
NRBC # BLD AUTO: 0 10*3/UL
NRBC BLD AUTO-RTO: 0 /100
OSMOLALITY UR: 385 MMOL/KG (ref 100–1200)
PHOSPHATE SERPL-MCNC: 3 MG/DL (ref 2.5–4.5)
PHOSPHATE SERPL-MCNC: 4.7 MG/DL (ref 2.5–4.5)
PLATELET # BLD AUTO: 121 10E9/L (ref 150–450)
POTASSIUM SERPL-SCNC: 3.6 MMOL/L (ref 3.4–5.3)
POTASSIUM SERPL-SCNC: 3.8 MMOL/L (ref 3.4–5.3)
POTASSIUM UR-SCNC: 9 MMOL/L
RBC # BLD AUTO: 2.8 10E12/L (ref 4.4–5.9)
SODIUM SERPL-SCNC: 146 MMOL/L (ref 133–144)
SODIUM SERPL-SCNC: 149 MMOL/L (ref 133–144)
SODIUM UR-SCNC: 12 MMOL/L
SPECIMEN EXP DATE BLD: NORMAL
SPECIMEN SOURCE: ABNORMAL
WBC # BLD AUTO: 6.3 10E9/L (ref 4–11)

## 2017-07-29 PROCEDURE — 84133 ASSAY OF URINE POTASSIUM: CPT | Performed by: INTERNAL MEDICINE

## 2017-07-29 PROCEDURE — 84100 ASSAY OF PHOSPHORUS: CPT | Performed by: STUDENT IN AN ORGANIZED HEALTH CARE EDUCATION/TRAINING PROGRAM

## 2017-07-29 PROCEDURE — 80048 BASIC METABOLIC PNL TOTAL CA: CPT | Performed by: STUDENT IN AN ORGANIZED HEALTH CARE EDUCATION/TRAINING PROGRAM

## 2017-07-29 PROCEDURE — 25000132 ZZH RX MED GY IP 250 OP 250 PS 637: Performed by: TRANSPLANT SURGERY

## 2017-07-29 PROCEDURE — 27210913 ZZH NUTRITION PRODUCT INTERMEDIATE PACKET

## 2017-07-29 PROCEDURE — 97530 THERAPEUTIC ACTIVITIES: CPT | Mod: GP | Performed by: PHYSICAL THERAPIST

## 2017-07-29 PROCEDURE — 25000128 H RX IP 250 OP 636

## 2017-07-29 PROCEDURE — 25000128 H RX IP 250 OP 636: Performed by: PHYSICIAN ASSISTANT

## 2017-07-29 PROCEDURE — 25000132 ZZH RX MED GY IP 250 OP 250 PS 637: Performed by: STUDENT IN AN ORGANIZED HEALTH CARE EDUCATION/TRAINING PROGRAM

## 2017-07-29 PROCEDURE — 85025 COMPLETE CBC W/AUTO DIFF WBC: CPT | Performed by: STUDENT IN AN ORGANIZED HEALTH CARE EDUCATION/TRAINING PROGRAM

## 2017-07-29 PROCEDURE — 25000128 H RX IP 250 OP 636: Performed by: STUDENT IN AN ORGANIZED HEALTH CARE EDUCATION/TRAINING PROGRAM

## 2017-07-29 PROCEDURE — 40000193 ZZH STATISTIC PT WARD VISIT: Performed by: PHYSICAL THERAPIST

## 2017-07-29 PROCEDURE — 99291 CRITICAL CARE FIRST HOUR: CPT | Mod: GC | Performed by: SURGERY

## 2017-07-29 PROCEDURE — 86900 BLOOD TYPING SEROLOGIC ABO: CPT | Performed by: STUDENT IN AN ORGANIZED HEALTH CARE EDUCATION/TRAINING PROGRAM

## 2017-07-29 PROCEDURE — 12000025 ZZH R&B TRANSPLANT INTERMEDIATE

## 2017-07-29 PROCEDURE — 25000125 ZZHC RX 250: Performed by: TRANSPLANT SURGERY

## 2017-07-29 PROCEDURE — 25000132 ZZH RX MED GY IP 250 OP 250 PS 637: Performed by: SURGERY

## 2017-07-29 PROCEDURE — 86901 BLOOD TYPING SEROLOGIC RH(D): CPT | Performed by: STUDENT IN AN ORGANIZED HEALTH CARE EDUCATION/TRAINING PROGRAM

## 2017-07-29 PROCEDURE — 40000225 ZZH STATISTIC SLP WARD VISIT: Performed by: SPEECH-LANGUAGE PATHOLOGIST

## 2017-07-29 PROCEDURE — 25000128 H RX IP 250 OP 636: Performed by: TRANSPLANT SURGERY

## 2017-07-29 PROCEDURE — 25000128 H RX IP 250 OP 636: Performed by: NURSE PRACTITIONER

## 2017-07-29 PROCEDURE — 92610 EVALUATE SWALLOWING FUNCTION: CPT | Mod: GN | Performed by: SPEECH-LANGUAGE PATHOLOGIST

## 2017-07-29 PROCEDURE — 25000131 ZZH RX MED GY IP 250 OP 636 PS 637: Performed by: TRANSPLANT SURGERY

## 2017-07-29 PROCEDURE — P9047 ALBUMIN (HUMAN), 25%, 50ML: HCPCS | Performed by: NURSE PRACTITIONER

## 2017-07-29 PROCEDURE — 87106 FUNGI IDENTIFICATION YEAST: CPT | Performed by: SURGERY

## 2017-07-29 PROCEDURE — 27210432 ZZH NUTRITION PRODUCT RENAL BASIC LITER

## 2017-07-29 PROCEDURE — 83935 ASSAY OF URINE OSMOLALITY: CPT | Performed by: INTERNAL MEDICINE

## 2017-07-29 PROCEDURE — 25000125 ZZHC RX 250: Performed by: SURGERY

## 2017-07-29 PROCEDURE — 84300 ASSAY OF URINE SODIUM: CPT | Performed by: INTERNAL MEDICINE

## 2017-07-29 PROCEDURE — 83735 ASSAY OF MAGNESIUM: CPT | Performed by: STUDENT IN AN ORGANIZED HEALTH CARE EDUCATION/TRAINING PROGRAM

## 2017-07-29 PROCEDURE — 00000146 ZZHCL STATISTIC GLUCOSE BY METER IP

## 2017-07-29 PROCEDURE — 87205 SMEAR GRAM STAIN: CPT | Performed by: SURGERY

## 2017-07-29 PROCEDURE — 86850 RBC ANTIBODY SCREEN: CPT | Performed by: STUDENT IN AN ORGANIZED HEALTH CARE EDUCATION/TRAINING PROGRAM

## 2017-07-29 PROCEDURE — 87070 CULTURE OTHR SPECIMN AEROBIC: CPT | Performed by: SURGERY

## 2017-07-29 RX ORDER — SODIUM CHLORIDE 9 MG/ML
INJECTION, SOLUTION INTRAVENOUS
Status: COMPLETED
Start: 2017-07-29 | End: 2017-07-29

## 2017-07-29 RX ORDER — MEROPENEM 1 G/1
1 INJECTION, POWDER, FOR SOLUTION INTRAVENOUS EVERY 8 HOURS
Status: DISCONTINUED | OUTPATIENT
Start: 2017-07-29 | End: 2017-08-03

## 2017-07-29 RX ORDER — LOPERAMIDE HYDROCHLORIDE 1 MG/5ML
2 SOLUTION ORAL 2 TIMES DAILY
Status: DISCONTINUED | OUTPATIENT
Start: 2017-07-29 | End: 2017-07-30

## 2017-07-29 RX ADMIN — DAPTOMYCIN 750 MG: 500 INJECTION, POWDER, LYOPHILIZED, FOR SOLUTION INTRAVENOUS at 17:11

## 2017-07-29 RX ADMIN — MEROPENEM 1 G: 1 INJECTION, POWDER, FOR SOLUTION INTRAVENOUS at 09:57

## 2017-07-29 RX ADMIN — TACROLIMUS 1 MG: 5 CAPSULE ORAL at 07:39

## 2017-07-29 RX ADMIN — MULTIVIT AND MINERALS-FERROUS GLUCONATE 9 MG IRON/15 ML ORAL LIQUID 15 ML: at 07:39

## 2017-07-29 RX ADMIN — HUMAN INSULIN 4 UNITS/HR: 100 INJECTION, SOLUTION SUBCUTANEOUS at 17:31

## 2017-07-29 RX ADMIN — Medication 1 PACKET: at 07:40

## 2017-07-29 RX ADMIN — ACETAMINOPHEN 650 MG: 325 TABLET, FILM COATED ORAL at 08:11

## 2017-07-29 RX ADMIN — Medication 80 MG: at 07:39

## 2017-07-29 RX ADMIN — ALBUMIN (HUMAN) 25 G: 12.5 SOLUTION INTRAVENOUS at 03:54

## 2017-07-29 RX ADMIN — MEROPENEM 1 G: 1 INJECTION, POWDER, FOR SOLUTION INTRAVENOUS at 01:42

## 2017-07-29 RX ADMIN — Medication 1 PACKET: at 13:08

## 2017-07-29 RX ADMIN — HEPARIN SODIUM 5000 UNITS: 5000 INJECTION, SOLUTION INTRAVENOUS; SUBCUTANEOUS at 07:40

## 2017-07-29 RX ADMIN — HEPARIN SODIUM 5000 UNITS: 5000 INJECTION, SOLUTION INTRAVENOUS; SUBCUTANEOUS at 17:28

## 2017-07-29 RX ADMIN — OXYCODONE HYDROCHLORIDE 5 MG: 5 SOLUTION ORAL at 01:42

## 2017-07-29 RX ADMIN — SODIUM CHLORIDE 500 ML: 9 INJECTION, SOLUTION INTRAVENOUS at 09:28

## 2017-07-29 RX ADMIN — MICAFUNGIN SODIUM 100 MG: 10 INJECTION, POWDER, LYOPHILIZED, FOR SOLUTION INTRAVENOUS at 13:08

## 2017-07-29 RX ADMIN — POTASSIUM CHLORIDE 20 MEQ: 1.5 POWDER, FOR SOLUTION ORAL at 04:51

## 2017-07-29 RX ADMIN — HUMAN INSULIN 3 UNITS/HR: 100 INJECTION, SOLUTION SUBCUTANEOUS at 09:27

## 2017-07-29 RX ADMIN — ACETAMINOPHEN 650 MG: 325 TABLET, FILM COATED ORAL at 13:08

## 2017-07-29 RX ADMIN — MEROPENEM 1 G: 1 INJECTION, POWDER, FOR SOLUTION INTRAVENOUS at 17:59

## 2017-07-29 RX ADMIN — TACROLIMUS 3 MG: 5 CAPSULE ORAL at 20:42

## 2017-07-29 RX ADMIN — PANTOPRAZOLE SODIUM 40 MG: 40 TABLET, DELAYED RELEASE ORAL at 07:39

## 2017-07-29 RX ADMIN — LOPERAMIDE HYDROCHLORIDE 2 MG: 1 SOLUTION ORAL at 20:42

## 2017-07-29 RX ADMIN — GANCICLOVIR SODIUM 250 MG: 500 INJECTION, POWDER, LYOPHILIZED, FOR SOLUTION INTRAVENOUS at 06:24

## 2017-07-29 RX ADMIN — GANCICLOVIR SODIUM 450 MG: 500 INJECTION, POWDER, LYOPHILIZED, FOR SOLUTION INTRAVENOUS at 20:27

## 2017-07-29 NOTE — PROGRESS NOTES
07/29/17 0804   General Information   Onset Date 07/04/17   Start of Care Date 07/29/17   Referring Physician Misti Ronquillo MD    Patient Profile Review/OT: Additional Occupational Profile Info See Profile for full history and prior level of function   Patient/Family Goals Statement Pt would like some 7up.    Swallowing Evaluation Bedside swallow evaluation   Behaviorial Observations Confused   Mode of current nutrition [place]   Respiratory Status O2 Supply   Type of O2 supply Nasal cannula   Comments Camacho Bhagat is a 51 yo M with a history of ESLD due to [place] s/p DD OLT 3/4/17 admitted 7/4/17 from [facility] ED for evaluation and management of sepsis secondary to colitis, taken to OR for initial exploratory laparotomy with findings of typhlitis in the right colon, wound left open with wound vac in place for reexploration and interval closure on 7/5/2017.  Pt known to  service from prior episode of care; discharged due to intubation on dysphagia diet level 3 and thin liquids.  MD team ok'ed all trials.     Clinical Swallow Evaluation   Oral Musculature generally intact;other (see comments)  (Generalized weakness )   Structural Abnormalities none present   Dentition present and adequate   Mucosal Quality adequate   Mandibular Strength and Mobility intact   Oral Labial Strength and Mobility WFL   Lingual Strength and Mobility WFL   Laryngeal Function Cough;Throat clear;Swallow;Voicing initiated;Dry swallow palpated   Oral Musculature Comments WFL   Additional Documentation Yes   Swallow Eval   Feeding Assistance dependent   Clinical Swallow Eval: Thin Liquid Texture Trial   Mode of Presentation, Thin Liquids spoon;straw;self-fed   Volume of Liquid or Food Presented ice chips x5, sips of water via straw x5    Oral Phase of Swallow Premature pharyngeal entry   Pharyngeal Phase of Swallow impaired;coughing/choking;throat clearing;no awareness of problems   Diagnostic Statement Positive s/s of aspiration.    Clinical  Swallow Eval: Nectar Thick Liquid Texture Trial   Mode of Presentation, Nectar straw;fed by clinician   Volume of Nectar Presented 2 oz nectar thick water    Oral Phase, Nectar WFL   Pharyngeal Phase, [place] intact   Diagnostic Statement Functional swallow for nectar thick liquids.    Clinical Swallow Eval: Puree Solid Texture Trial   Mode of Presentation, Puree spoon;fed by clinician   Volume of Puree Presented 1 tsp bites x4    Oral Phase, Puree WFL   Pharyngeal Phase, Puree intact   Diagnostic Statement Functional swallow for pureed textures.    Clinical Swallow Eval: Solid Food Texture Trial   Diagnostic Statement Deferred due to increased risk.    Swallow Compensations   Swallow Compensations Reduce amounts;Pacing;Alternate viscosity of consistencies   Esophageal Phase of Swallow   Patient reports or presents with symptoms of esophageal dysphagia No   General Therapy Interventions   Planned Therapy Interventions Dysphagia Treatment   Dysphagia treatment Modified diet education;Instruction of safe swallow strategies;Compensatory strategies for swallowing   Swallow Eval: Clinical Impressions   Skilled Criteria for Therapy Intervention Skilled criteria met.  Treatment indicated.   Functional Assessment Scale (FAS) 5   Treatment Diagnosis mild dysphagia   Diet texture recommendations Full liquid;Nectar thick liquids   Recommended Feeding/Eating Techniques alternate between small bites and sips of food/liquid;maintain upright posture during/after eating for 30 mins;small sips/bites   Demonstrates Need for Referral to Another Service dietitian;occupational therapy;physical therapy   Therapy Frequency daily   Predicted Duration of Therapy Intervention (days/wks) 2 weeks   Anticipated Discharge Disposition inpatient rehabilitation facility   Risks and Benefits of Treatment have been explained. Yes   Patient, family and/or staff in agreement with Plan of Care Yes   Clinical Impression Comments Pt seen bedside for  swallow evaluation per MD orders.  Pt presents with mild oropharyngeal dysphagia characterized by weakness likely related to recent intubation and lengthy hospital course.  Aspiration risk further increased by current mental status.  Pt demonstrated functional tolernace of nectar thick liquids and pureed consistencies with persistent positive s/s of aspiration on thin liquid trials.  Pt demonstrated no awareness towards deficits.  Solid texture trials deferred due to increased risk.  Recommend advance diet to nectar thick full liquids when alert and upright.  Pt will need to be fed small bites/sips at a slow pace while alternating consistencies.   ST to follow as indicated on POC to assess diet tolerance, trial advanced textures, and educate pt/caregivers.     Total Evaluation Time   Total Evaluation Time (Minutes) 16

## 2017-07-29 NOTE — PROGRESS NOTES
[facility]    Transplant Infectious Diseases Inpatient Progress note      Camacho Bhagat MRN# [id number]   YOB: 1965 Age: 51 year old   Date of Admission: 7/4/2017  4:45 AM  Transplant: 3/4/2017 (Liver), Postoperative day: 147            Recommendations:   1. Continue broad spectrum meropenem/daptomycin/micafungin.   2. Continue GCV IV (thank you for increasing the dose to 5 mg/kg q12hr).   3. I would discourage sending respiratory samples for microbiologic studies.    4. Low threshold to repeat CT abdomen and pelvis if still concerned about persistent sepsis.   5. Quantitative CMV PCR weekly (next due on 8/3/2017).   7. EBV PCR every other week (next on 8/1/2017).  8. CPK weekly (next 8/1/2017).         Summary of Presentation:   Transplants:  3/4/2017 (Liver), Postoperative day:  147     This patient is a 51 year old male with CASTAÑEDA s/p OLT in 3/2017. Basilixima for induction.   MMF was held due to neutropenia and multiple infections upon admission. TAC was held 7/25/2017 for perforated viscus.      Presented with colitis, s/p exploratory laparotomy. Attributed to neutropenic colitis.   Then started to develop CMV viremia (primary infection)  Now with intra-abdominal abscess and severe sepsis.         Active Problems and Infectious Diseases Issues:   1. Severe sepsis picture 7/25/2017.   2. Intra-abdominal abscess s/p evacuation and possible perforated viscus.    Underwent exp lap with hemicolectomy on 7/26/2017.   Intraoperatively no perforation was noted, right colitis was noted with intraabdominal abscess.   Will follow on pathology.   The persistent fever is likely due to persistent inflammatory process in the abdomen, it will eventually subside. If persists longer than expected, I would have low threshold to repeat CT abdomen and pelvis.  I doubt the need for sputum cx since it may reveal contamination or colonization in the respiratory tract. It is unlikely that the  patient has pneumonia while his respiratory status is improving. The worsening radiologic findings are likely to be due to edema.   Continue broad spectrum ABx with meropenem/daptomycin/micafungin.   Check weekly CPK while on daptomycin.     3. Neutropenic vs CMV vs PTLD colitis (without ischemia per exp lap upon admission)   4. CMV primary infection (donor derived)  5. EBV primary infection (donor derived)  The initial colitis at presentation on admission was thought to be due to neutropenia-induced colitis. Later during admission CMV viremia ensued and the possibility of CMV colitis was entertained, the CMV viremia was at low level and colonoscopy was recommended by ID to ascertain whether the patient has end organ damage with CMV colitis. Colonoscopy done on 7/21/2017 with poor prep and negative random biopsies.   Finished 14 days of ertapenem on 7/18/2017 for colitis. The neutropenia was attributed to drugs (MMF/bactrim/VGCV, all were DCed prior to presentation).   Now with perforated viscus and peritoneal gas on broad spectrum ABx.     The primary CMV infection is donor derived following discontinuation of VGCV and in part due to acute illness as well.   Whether the patient also has CMV colitis is not clear, random colon biopsies were negative. Will wait for the pathology from the hemicolectomy procedure.   The patient needs to be treated for primary CMV infection anyway due to CMV viral load above the threshold of 1500 IU/mL.   IV GCV was initiated 7/20/2017 (level was 1900 IU/mL), the dose was increased to high dose therapy on 6/25/2017 since the viremia occurred while on and off VGCV for ppx and potential resistance is possible.   Good response with decreasing viral load to 290 IU/mL on 7/27/2017.   We should continue to follow weekly CMV PCR.   The pathology of the hemicolectomy should help ruling out/in CMV colitis.     Random colon biopsies with colonoscopy were also not suggestive of PTLD.   Continue to  monitor EBV PCR every other week.   Ascitic fluid cytology and leukemia/lymphoma studies negative.   MRI of brain not suggestive of CNS PTLD.   The pathology of the hemicolectomy should help ruling out/in PTLD.     6. EJ   Improving.   Due to severe sepsis.   We are adjusting the doses of ABx and antiviral accordingly.     7. Encephalopathy.   Due to sepsis.   With negative MRI of brain for lesions.     8. 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    These are likely all colonizers or contaminants.   The patient was started on daptomycin for severe sepsis and colonization with VRE.     9. Staphylococcus capitis in ascitic fluid 7/5/2017   Contamination.     10. Rhinovirus in BAL 7/15/2017  Likely to be due to chronic shedding since the patient's main presentation was mostly abdominal not respiratory.      Other Infectious Disease issues include  - PCP prophylaxis: bactrim DCed in June of 2017 due to neutropenia.   - Serostatus: CMV D+/R-, EBV D+/R-, HSV1?/2?, VZV ?   Now he's on GCV IV.   - Immunization status: up-to-date  - Gamma globulin status: >1660 as of 7/24/2017.       Attestation:  I interviewed the patient and obtained history from the patient, and by reviewing the patient's chart including outside records, microbiological data, and radiological data. All data are summarized in this notes.  Naseem Figueroa   Pager: [phone]  07/29/2017        Interim History:  7/29/2017: pleasantly confused but oriented to place and self, follows commands and answers questions but not totally appropriately. C/o abdominal pain, no other complaints.     7/21/2017: Underwent colonoscopy but with poor prep, it was poor study. The patient started to spike intermittent low grade fevers.    7/25/2017: Patient is obtunded, shallow breathing. Looking septic. Was transferred to SICU and was intubated.   7/26/2017: underwent exp lap with right  hemicolectomy, end ileostomy, and mucus fistula.   7/28/2017: extubated    HPI:  In summary:  CASTAÑEDA s/p OLT in 3/2017.   Presented with abdominal pain and underwent exploratory laparotomy which was not c/w with ischemia. It was felt to be related to neutropenia.   The patient has neutropenia since June of 2017 attributed to immunosuppressive therapy/bactrim/VGCV.   Bactrim DCed June of 2017 and VGCV was DCed at unknown tome.     The patient was started on broad spectrum ABx with persistent fever.   Bronchoscopy done 7/12/2017 grew single colony of VRE which was considered a colonizer.   Ascitic fluid checked on 7/5/2017 and grew S capitis considered a contaminant.   Repeat BAL on 7/15/2017 and repeat paracentesis on 7/7/14/2017 were unremarkable for growth but the ascitic fluid was exudative.     The course was complicated by thrombosis of the right radial artery with ischemia to to the tip of the right index finger.   He also has ischemia to the right hallux and second toe.     The patient finished a course of ertapenem, his mental status started to improve.   CMV PCR became detected at low level and workup for possible CMV colitis was initiated. Colonoscopy was with poor prep and random biopsies were negative for colitis including CMV colitis. GCV was initiated on 7/20/2017 (repeat CMV PCR was around 1900 IU/mL).     The patient started to spike low grade fever on 7/21/2017, on 7/25/2017 he suffered from acute respiratory distress and became obtunded with distended abdomen. He was transferred to ICU and was intubated. The patient was started on daptomycin/meropenem/micafungin. GCV dose was increased   CT c/a/p showed large RUQ intraabdominal extraluminal gas with possible feculent material.   He was taken to OR on 7/26/2017 and underwent exp lap with right hemicolectomy, end ileostomy, and mucus fistula.         ROS:  As mentioned in the interim history otherwise negative by reviewing central and peripheral  neurological systems, respiratory system, cardiac system, GI system,  system, musculoskeletal, skin, allergy, and lymphatics.                  Pysical Examination:  Temp: 102.2  F (39  C) Temp src: Axillary BP: 140/73   Heart Rate: 102 Resp: 24 SpO2: 95 % O2 Device: Nasal cannula Oxygen Delivery: 2 LPM  Vitals:    07/24/17 0254 07/25/17 0900 07/26/17 0400 07/27/17 0400   Weight: 106.9 kg (235 lb 11.2 oz) 112 kg (246 lb 14.4 oz) 106.2 kg (234 lb 2.1 oz) 99 kg (218 lb 4.1 oz)    07/29/17 0000   Weight: 98.2 kg (216 lb 7.9 oz)     Constitutional: lying in bed in no apparent distress, answers questions and follows commands.    CVS: tachycardic, normal S1/S2, no murmur.   Lungs: upper airway coarse BS bilaterally anteriorly, no accessory muscle use.   Abdomen: not distended, soft but tender, no masses, no bruit, normal BS, midline wound is intact with no dehiscence or erythema and no drainage, RLQ ileostomy and proximal midline wound with mucus fistula.   Extremities: no pitting edema of bilateral lower extremities, ischemic right hallux and right second toe, also ischemic tip of left second toe, and right index, no ulcers.   Genitalia: vazquez catheter in place.   Skin: as the exam of the extremities and the abdomen, no induration, fluctuation or discharge,and no rash       Medications:  Medications that Require Transfusion:     lactated ringers 10 mL/hr at 07/28/17 0956     insulin (regular) 3 Units/hr (07/29/17 0927)     IV fluid REPLACEMENT ONLY 25 mL/hr at 07/20/17 0430   Scheduled Medications:     meropenem  1 g Intravenous Q8H     ganciclovir (CYTOVENE) intermittent infusion  5 mg/kg (Dosing Weight) Intravenous Q12H     tacrolimus  1 mg Oral BID IS     protein modular  1 packet Per Feeding Tube TID     DAPTOmycin (CUBICIN) intermittent infusion  8 mg/kg (Dosing Weight) Intravenous Q24H     micafungin  100 mg Intravenous Q24H     aspirin  80 mg Oral Daily     multivitamins with minerals  15 mL Per Feeding Tube  Daily     heparin  5,000 Units Subcutaneous Q8H     pantoprazole  40 mg Oral or Feeding Tube Daily     sodium chloride (PF)  3 mL Intracatheter Q8H       Laboratory Data:   No results found for: ACD4    Inflammatory Markers    Recent Labs   Lab Test  07/05/17   1810  03/05/17   0358  11/23/16   0813  11/16/16   0706  11/15/16   1039  11/07/16   0759  11/03/16   0949  11/01/16   0853   08/15/11   1151  04/11/11   1209  01/03/11   1246  02/15/10   1232   SED   --    --   45*   --    --    --    --    --    --   11  14  17*  25*   CRP  354.0  20.0*  58.0*  59.1*  49.8*  66.0*  140.0*  150.0*   < >  11.1*  9.0*  11.7*  17.5*    < > = values in this interval not displayed.       Immune Globulin Studies     Recent Labs   Lab Test  07/24/17   0705  08/15/11   1243   IGG  1660*  1160   IGG1   --   734   IGG2   --   294   IGG3   --   7*   IGG4   --   59       Metabolic Studies       Recent Labs   Lab Test  07/29/17   0336  07/28/17   1648  07/28/17   0410  07/27/17   2110  07/27/17   0410  07/26/17   2243   07/26/17   0522   07/25/17   0945   07/06/17   0316   NA  149*  146*  144  143  142  140   < >   --    < >  137   < >  143   POTASSIUM  3.6  3.8  3.8  3.4  3.5  3.5   < >   --    < >  4.0   < >  5.0   CHLORIDE  118*  115*  112*  111*  108  108   --    --    < >  104   < >  116*   CO2  21  20  18*  23  20  21   --    --    < >  26   < >  18*   ANIONGAP  10  11  13  9  14  11   --    --    < >  7   < >  9   BUN  64*  70*  71*  77*  72*  75*   --    --    < >  77*   < >  62*   CR  1.54*  1.82*  2.15*  2.45*  2.82*  2.76*   --    --    < >  2.60*   < >  2.75*   GFRESTIMATED  48*  39*  32*  28*  24*  24*   --    --    < >  26*   < >  24*   GLC  113*  136*  161*  66*  164*  151*   < >   --    < >  382*   < >  139*   A1C   --    --    --    --    --    --    --    --    --    --    --   Canceled, Test credited   Below Assay Range  NOTIFIED LEONARD ONEILL AT 0538 ON 7/6/17 BY CHRISSY     JACOB  7.8*  8.1*  7.7*  7.9*  7.9*  7.6*   --     --    < >  7.6*   < >  6.9*   PHOS  4.7*  5.5*  6.6*  6.1*  5.8*   --    --    --    < >   --    < >  5.5*   MAG  2.5*  2.5*  2.5*  2.0  2.0   --    --    --    < >   --    < >  2.1   LACT   --    --    --    --    --   1.0   --   0.6*   < >   --    < >  1.5   CKT   --    --    --    --    --    --    --    --    --   40   --    --     < > = values in this interval not displayed.       Hepatic Studies    Recent Labs   Lab Test  07/27/17   0410  07/25/17   1651  07/25/17   0945  07/25/17   0017  07/24/17   0705  07/23/17   0625  07/21/17   0615   07/11/17   0328   07/05/17   1810   BILITOTAL  1.3  0.6  0.5   --   0.4  0.3  0.5   < >  0.5   < >   --    ALKPHOS  143  242*  317*   --   264*  224*  297*   < >  158*   < >   --    ALBUMIN  2.4*  2.0*  1.9*   --   1.7*  1.9*  1.9*   < >  1.8*   < >   --    AST  27  61*  90*   --   86*  63*  75*   < >  34   < >   --    ALT  27  50  60   --   54  47  51   < >  27   < >   --    LDH   --    --    --   258*   --    --    --    --    --    --   289*   GGT   --    --    --    --    --    --    --    --   58   --    --     < > = values in this interval not displayed.       Pancreatitis testing    Recent Labs   Lab Test  07/24/17   0705  07/17/17   0630  07/12/17   0342  07/10/17   0340  07/05/17   0346  03/05/17   0358  03/03/17   1458  02/21/17   1520  12/09/16   1159  02/08/16   1144   09/30/10   1734   AMYLASE   --    --    --    --    --   53  70   --    --    --    --   82   LIPASE   --    --    --    --    --   216   --   326  161   --    --   138   TRIG  303*  168*  114  177*  174*   --    --    --    --   99   < >   --     < > = values in this interval not displayed.       Hematology Studies      Recent Labs   Lab Test  07/29/17   0336  07/28/17   0410  07/27/17   0410  07/26/17   2243  07/26/17   2133  07/26/17   2046   07/26/17   0431  07/25/17   1651  07/25/17   0945  07/24/17   0705   WBC  6.3  9.3  10.0  10.3   --    --    --   7.7  10.3  11.9*  6.4   ANEU  4.4  7.1   7.9   --    --    --    --   5.6   --   10.8*  3.7   ALYM  1.5  1.6  1.6   --    --    --    --   1.6   --   0.4*  2.3   ZINA  0.2  0.5  0.4   --    --    --    --   0.3   --   0.3  0.3   AEOS  0.1  0.1  0.1   --    --    --    --   0.1   --   0.1  0.1   HGB  8.1*  8.0*  9.3*  9.1*  9.5*  9.6*   < >  5.9*  7.5*  8.8*  7.8*   HCT  24.8*  24.4*  27.7*  27.5*   --    --    --   17.6*  23.4*  27.6*  24.8*   PLT  121*  128*  144*  136*   --    --    < >  109*  141*  168  126*    < > = values in this interval not displayed.       Arterial Blood Gas Testing    Recent Labs   Lab Test  07/26/17 2243 07/26/17   2133  07/26/17   2046  07/26/17   2017  07/25/17   1746  07/25/17   1037  07/08/17   0902   PH  Incorrect specimen type  NOTTIED BY WOJCIECH DEWITT, NEW ORDER TO REPLACE.7/26/17 AT 2346 BY ZAINAB.  CORRECTED ON 07/26 AT 2350: PREVIOUSLY REPORTED AS 7.27     --    --   Canceled, Test credited   Incorrect specimen type    7.32*  7.14*  7.32*   PCO2  Incorrect specimen type  NOTTIED BY WOJCIECH DEWITT NEW ORDER TO REPLACE.7/26/17 AT 2346 BY ZAINAB.  CORRECTED ON 07/26 AT 2350: PREVIOUSLY REPORTED AS 45     --    --   Canceled, Test credited   Incorrect specimen type    42  69*  35   PO2  Incorrect specimen type  NOTTIED BY WOJCIECH DEWITT NEW ORDER TO REPLACE.7/26/17 AT 2346 BY ZAINAB.  CORRECTED ON 07/26 AT 2350: PREVIOUSLY REPORTED AS 53     --    --   Canceled, Test credited   Incorrect specimen type    81  103  96   HCO3  Incorrect specimen type  NOTTIED BY WOJCIECH DEWITT NEW ORDER TO REPLACE.7/26/17 AT 2346 BY ZAINAB.  CORRECTED ON 07/26 AT 2350: PREVIOUSLY REPORTED AS 20     --    --   Canceled, Test credited   Incorrect specimen type    22  23  18*   O2PER  Incorrect specimen type  NOTTIED BY WOJCIECH DEWITT, NEW ORDER TO REPLACE.7/26/17 AT 2346 BY ZAINAB.  CORRECTED ON 07/26 AT 2350: PREVIOUSLY REPORTED AS 40    40  62  100  100.0  100  40.0  7L  30        Urine Studies     Recent Labs   Lab Test  07/28/17   1042  07/25/17   1205   07/19/17   1150  07/11/17   1105  07/06/17   1441   URINEPH  5.0  5.0  5.0  5.0  5.0   NITRITE  Negative  Negative  Negative  Negative  Negative   LEUKEST  Negative  Trace*  Negative  Negative  Trace*   WBCU  6*  5*  2  <1  9*       Vancomycin Levels     Recent Labs   Lab Test  07/06/17   0316  10/28/16   1405   VANCOMYCIN  22.1  14.4       Tacrolimus levels    Invalid input(s): TACROLIMUS, TAC, TACR  Transplant Immunosuppression Labs Latest Ref Rng & Units 7/29/2017 7/28/2017 7/28/2017 7/27/2017 7/27/2017   Tacro Level 5.0 - 15.0 ug/L - - - - <3.0  Tacrolimus Reference Range   Kidney Transplant   Pediatric                      ug/L     0-3 months post transplant   10-12     3-6 months post transplant   8-10     6-12 months post transplant  6-8     >12 months post transplant   4-7   Adult     0-6 months post transplant   8-10     6-12 months post transplant  6-8     >12 months post transplant   4-6     >5 years post transplant     3-5   Heart Transplant   Pediatric     0-12 months post transplant  10-15     >12 months post transplant   5-10   Adult     0-3 months post transplant   10-15     3-6 months post transplant   8-12     6-12 months post transplant  6-12     >12 months post transplant   6-10   Lung Transplant     0-12 months post transplant  10-15     >12 months post transplant   8-12   Liver Transplant   Pediatric     0-3 months post transplant   10-15     3-6 months post transplant   8-10     >6 months post transplant    6-8   Adult     0-3 months post transplant   10-12     3-6 months post transplant   8-10     >6   months post transplant    6-8   Pancreas Transplant     0-6 months post transplant   8-10     >6 months post transplant    5-8   This test was developed and its performance characteristics determined by the   [facility],  [facility]. It has   not been cleared or approved by the FDA. The laboratory is regulated under CLIA   as qualified to perform  high-complexity testing. This test is used for clinical   purposes. It should not be regarded as investigational or for research.  (L)   Tacro Level - - - - - Not Provided   Creat 0.66 - 1.25 mg/dL 1.54(H) 1.82(H) 2.15(H) 2.45(H) -   BUN 7 - 30 mg/dL 64(H) 70(H) 71(H) 77(H) -   WBC 4.0 - 11.0 10e9/L 6.3 - 9.3 - -   Neutrophil % 70.2 - 76.6 - -   ANEU 1.6 - 8.3 10e9/L 4.4 - 7.1 - -       CSF testing     Recent Labs   Lab Test  06/11/09   0225   CWBC  1   CRBC  2   CGLU  104*   CTP  54         Microbiology:  Ascites  7/26/2017 (OR) negative to date    7/25/2017 negative to date.     7/5/2017 S capitis    BAL   7/15/2017 yeast and Rhinovirus     7/12/2017 single colony of VRE, C albicans/dubliniensis   Sputum  7/11/2017 VRE and Coag Neg Staph     Bcx   7/28/2017 negative to date    7/25/2017 negative to date.     7/15/2017 negative     Last check of C difficile  C Diff Toxin B PCR   Date Value Ref Range Status   10/27/2016  NEG Final    Negative  Negative: Clostridium difficile target DNA sequences NOT detected, presumed   negative for Clostridium difficile toxin B or the number of bacteria present   may be below the limit of detection for the test.   FDA approved assay performed using [employer] GeneXpert real-time PCR.   A negative result does not exclude actual disease due to Clostridium difficile   and may be due to improper collection, handling and storage of the specimen or   the number of organisms in the specimen is below the detection limit of the   assay.         Virology:  CMV viral loads    Recent Labs   Lab Test  07/27/17   1109  07/20/17   1427  07/18/17   0530  07/15/17   1553  07/10/17   0340   CSPEC  EDTA PLASMA  CORRECTED ON 07/28 AT 0840: PREVIOUSLY REPORTED AS Blood    Plasma  Plasma  Bronchoalveolar Lavage  EDTA PLASMA   CMVLOG  2.5*  3.3*  3.0*  3.6*  2.2*       CMV viral loads    Log IU/mL of CMVQNT   Date Value Ref Range Status   07/27/2017 2.5 (H) <2.1 [Log_IU]/mL Final   07/20/2017 3.3 (H) <2.1  [Log_IU]/mL Final   07/18/2017 3.0 (H) <2.1 [Log_IU]/mL Final   07/15/2017 3.6 (H) <2.1 [Log_IU]/mL Final   07/10/2017 2.2 (H) <2.1 [Log_IU]/mL Final   07/03/2017 <2.1 <2.1 [Log_IU]/mL Final   06/26/2017 Not Calculated <2.1 [Log_IU]/mL Final       CMV resistance testing  Recent Labs   Lab Test  07/27/17   1109   CMVCID  Canceled, Test credited   Unsatisfactory specimen   Quantity not sufficient   Notification of test cancellation was given to  Naseem Kemp.7/28/17 at 1408.TV.     CMVFOS  Canceled, Test credited   Unsatisfactory specimen   Quantity not sufficient   Notification of test cancellation was given to  Naseem Kemp.7/28/17 at 1408.TV.     CMVGAN  Canceled, Test credited   Unsatisfactory specimen   Quantity not sufficient   Notification of test cancellation was given to  Naseem Kemp.7/28/17 at 1408.TV.       CMV Cidofovir Resist   Date Value Ref Range Status   07/27/2017   Final    Canceled, Test credited   Unsatisfactory specimen   Quantity not sufficient   Notification of test cancellation was given to  Naseem Kemp.7/28/17 at 1408.TV.       CMV Foscarnet Resist   Date Value Ref Range Status   07/27/2017   Final    Canceled, Test credited   Unsatisfactory specimen   Quantity not sufficient   Notification of test cancellation was given to  Naseem Kemp.7/28/17 at 1408.TV.       CMV Ganciclovir Resis   Date Value Ref Range Status   07/27/2017   Final    Canceled, Test credited   Unsatisfactory specimen   Quantity not sufficient   Notification of test cancellation was given to  Naseem Kemp.7/28/17 at 1408.TV.         USCNS Invalid input(s): USCN, USCNS    USCNS No components found for: USCN, USCNS      No results found for: H6RES    EBV DNA Copies/mL   Date Value Ref Range Status   07/18/2017 [id number] (A) EBVNEG [Copies]/mL Final         Pathology   Colon biopsies 7/21/2017   A: Colon biopsy, ascending   B: Colon biopsy, descending   FINAL DIAGNOSIS:   A. COLON BIOPSY, ASCENDING:   - Colonic  mucosa with no significant histologic abnormality   - Negative for intraepithelial lymphocytosis or deposition of   subepithelial thick collagenous band   B. COLON BIOPSY, DESCENDING:   - Crypt architecture distortion, consistent with healed prior injury   - Negative for active inflammation or dysplasia   - Negative for intraepithelial lymphocytosis or deposition of   subepithelial thick collagenous band     Cytology of ascitic fluid 7/25/2017   PERITONEAL FLUID, ASCITES:   -Negative for malignancy   Cytology of ascitic fluid 7/14/2017.   Peritoneal fluid, ascites:   - Negative for malignancy   - Acute and chronic inflammation present   Specimen Adequacy: Satisfactory for evaluation.   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   INTERPRETATION:   Ascites fluid:        Rare to absent viable B cells     COMMENT:   This specimen was collected on 7/14/17 but was not ordered/delivered to   lab/run until 7/18/17 - results should be interpreted with caution due   to low cellularity/viability.     Due to limited cellularity we were only able to analyze the B cells.   There is no immunophenotypic evidence of B cell non-Hodgkin lymphoma.   Neoplastic cells, including large cells, may not survive specimen   processing. This sample may not be representative. Final interpretation   requires correlation with morphologic and clinical features.       Imaging:  CXR 7/28/2017 reviewed by myself   IMPRESSION:   1.  Interval removal of the endotracheal tube.  2.  Increased perihilar opacities, worsening infection versus  pulmonary edema.   3.  Increased bilateral moderate pleural effusions with overlying  atelectasis/consolidation.     CT c/a/p 7/25/2017 reviewed by myself.   IMPRESSION:   1. New large heterogeneous area of right-sided retroperitoneal gas  which is highly concerning for an infectious process. Given the  history of prior neutropenic colitis and biopsies, there could also be  a component of stool from a ruptured viscus,  despite the lack of  surrounding bowel loops and no extraluminal oral contrast. Surgical  consultation is recommended.   2. Bibasilar atelectasis versus consolidation with small bilateral  pleural effusions.  3. Extensive mesenteric and soft tissue edema with large volume  ascites. Numerous mesenteric and retroperitoneal lymph nodes which are  presumably reactive.  4. Postsurgical changes of liver transplant.  CT c/a/p7/13/2017 Reviewed by myself.   IMPRESSION:   1. Improved moderate right-sided pleural effusion and resolution of  left-sided pleural effusion. There remain large areas of  atelectasis/consolidation in both lungs, including in the left lower  lobe despite resolution of left-sided pleural effusion. Superimposed  infectious process cannot be excluded.  2. Moderate-large amount of ascites increased from 7/8/2017, which  makes it difficult to evaluate for the presence or absence of  inflammatory change or infectious process in the abdomen or pelvis. No  intra-abdominal abscess.  3. Stable small presumed hematoma within the ventral abdominal wall  fat.  4. Splenomegaly.    MRI brain 7/20/2017.   Impression:  1. Significant image degradation due to motion artifact, with no  definite acute intracranial pathology. No abnormal contrast enhancing  intracranial lesions.  2. Mucosal thickening in the sphenoid and maxillary sinuses and left  greater than right mastoid fluid are nonspecific in the setting of  recent intubation.    Naseem Figueroa  Pager: [phone]

## 2017-07-29 NOTE — PLAN OF CARE
Problem: Goal Outcome Summary  Goal: Goal Outcome Summary  SLP: Pt seen bedside for swallow evaluation per MD orders.  Pt presents with mild oropharyngeal dysphagia characterized by weakness likely related to recent intubation and lengthy hospital course.  Aspiration risk further increased by current mental status.  Pt demonstrated functional tolernace of nectar thick liquids and pureed consistencies with persistent positive s/s of aspiration on thin liquid trials.  Pt demonstrated no awareness towards deficits.  Solid texture trials deferred due to increased risk.  Recommend advance diet to nectar thick full liquids when alert and upright.  Pt will need to be fed small bites/sips at a slow pace while alternating consistencies.   ST to follow as indicated on POC to assess diet tolerance, trial advanced textures, and educate pt/caregivers.

## 2017-07-29 NOTE — PROGRESS NOTES
Immunosuppression Note:    Camacho Bhagat is a 51 year old male who is seen today  for immunosuppression management     I, Jovan Tran MD, I have examined the patient with the resident/PA/Fellow, discussed and agree with the note and findings.  I have reviewed today's vital signs, medications, labs and imaging. I reviewed the immunosuppression medications and levels. 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 Concern for CMV colitis with low count CMV viremia/GI PTLD and subsequently underwent colonoscopy on 7/21, random biopsies obtained.  On 7/25/2017 patient had respiratory distress, abdominal compartment syndrome and EJ with oliguria. Broad spectrum antibiotics were started.  He was intubated and had a paracentesis with 5L removed. He did not required pressors.  CT was obtained which showed a new large heterogeneous right sided retroperitoneal gas and air tracking along duodenum.  No contrast extravasation.  No intraperitoneal air noted. Colorectal surgery was consulted. Initial conservative management with bowel rest and IV abx. Pt continued to spike fevers despite IV abx.  Now s/p Exploratory laparotomy, right angelique-colectomy, end ileostomy, mucus fistula, partial omentectomy on 7/26.    Graft Function: Good function. LFTs WNL. US liver unremarkable.  Immunosuppression Management:   CellCept discontinued (6/27/17) due to neutropenia.   Prograf goal ~8 due to single IS. 7/27 > 24 hr level < 3. Subtherapeutic  despite frequent dose increases, now with increased level. HELD for now due to EJ, will restart 1 mg BID today and check level on Sunday.   Cardiorespiratory: Acute respiratory distress with hypercapnia. Now extubated this AM.   CXR-pulmonary edema, moderate pleural effusions. CT scan chest, small b/l pleural effusions.  Hematology: Pancytopenia on admission, acute on chronic, secondary to medications (MMF, bactrim, valcyte). Improved with holding medications and neupogen. Possible due to CMV.  Started IV Ganciclovir 7/20.  Continue to hold MMF and bactrim.   -Anemia- HGB 9.3->8.0. Blood transfusion 1 unit 7/18, 4 units 7/26.   -Cytology ascites fluid, negative for malignancy   GI: Neutropenic colitis. Colonoscopy 7/21. Biopsy results showing resolving injury secondary to possible drug induced vs infectious.   -CT scan abd/pelvis, po contrast, showed a new large heterogeneous right sided retroperitoneal gas and air tracking along duodenum.  No contrast extravasation.  No intraperitoneal air noted. Colorectal surgery consulted.  Continued to spike high grade temperature despite IV antibiotics therefore patient taken to OR. s/p Exploratory laparotomy, right angelique-colectomy, end ileostomy, mucus fistula, partial omentectomy on 7/26. Path in process. Findings no neida perforation, phlegmon/inflammationright colon. WOCN consult for ostomy care.   -NPO. TF held at that time, now restarted, advancing to goal . Large output from the ilisotomy.  Fluid/Electrolytes/Renal: EJ oliguric likely sec to high intraabdominal pressures and ischemic ATN sec to sepsis. Nephrology consulted. No indication for RRT presently. UO slightly increased. Cr elevated, 2.8->2.5->2.2  Endocrine: DM type 2. On insulin gtt.  Infectious disease:   -Severe sepsis, right colon phlegmon -Ax with meropenem/daptomycin/micafungin. S/p right angelique-colectomy. Path pending. Fluid cx x 1 negative and 1 in process.  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   CMV discordent with CMV PCR increasing. Continue IV ganciclovir. Follow CMV PCR weekly  -7/25 Ucx, Bcx and ascites cx negative to date  -ID consulting.   Neuro: Toxic metabolic encephalopathy secondary to infection, prolonged hospital stay.  Vascular: Right Arterial line clot 2/2 previous arterial line. Vascular consulted. Recommend ASA only. Some evidence of microvascular injury in digits (toes) this is most likely due to injury while patient was on pressor therapy and sepsis. Now with a third toe injury, vascular consulted again. US completed.  ECHO EF 60-65%. 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   Activity: PT/OT  Prophylaxis: DVT-Heparin SQ  Disposition: To 7A  Medical Decision Making: Medium  Admit [id number] (moderate level decision making)    PILLO/Fellow/Resident Provider: Eric Dacosta  Fellow, Transplant surgery  Pager: [phone]  Faculty: Jovan Tran M.D.  __________________________________________________________________  Transplant History:.  3/4/2017 DD OLT with Dr. Tran (Liver), Postoperative day: 147     Interval History:   Overnight events: Tmax 102 , no complaints, extubated yesterday,   ROS:   A 10-point review of systems was negative except as noted above.    Meds:    meropenem  1 g Intravenous Q8H     ganciclovir (CYTOVENE) intermittent infusion  5 mg/kg (Dosing Weight) Intravenous Q12H     tacrolimus  3 mg Oral BID IS     protein modular  1 packet Per Feeding Tube TID     DAPTOmycin (CUBICIN) intermittent infusion  8 mg/kg (Dosing Weight) Intravenous Q24H     micafungin  100 mg Intravenous Q24H     aspirin  80 mg Oral Daily     multivitamins with minerals  15 mL Per Feeding Tube Daily     heparin  5,000 Units Subcutaneous Q8H     pantoprazole  40 mg Oral or Feeding Tube Daily     sodium chloride (PF)  3 mL Intracatheter Q8H       Physical Exam:     Admit Weight: 94.2 kg (207 lb 11.2 oz) (SCALE 2)    Current vitals:   /80  Pulse 90  Temp 102.2  F  (39  C) (Axillary)  Resp 24  Ht 1.829 m (6')  Wt 98.2 kg (216 lb 7.9 oz)  SpO2 96%  BMI 29.36 kg/m2    Vital sign ranges:    Temp:  [98.7  F (37.1  C)-102.2  F (39  C)] 102.2  F (39  C)  Heart Rate:  [] 96  Resp:  [15-33] 24  BP: (128-162)/(61-83) 151/80  SpO2:  [92 %-99 %] 96 %  Patient Vitals for the past 24 hrs:   BP Temp Temp src Heart Rate Resp SpO2 Weight   07/29/17 1000 151/80 - - 96 24 96 % -   07/29/17 0900 151/80 - - 101 21 96 % -   07/29/17 0800 - 102.2  F (39  C) Axillary 102 24 95 % -   07/29/17 0700 - - - 96 17 93 % -   07/29/17 0600 140/73 - - 98 20 95 % -   07/29/17 0500 143/76 - - 98 22 96 % -   07/29/17 0400 142/77 100  F (37.8  C) Axillary 98 24 94 % -   07/29/17 0300 141/70 - - 96 23 96 % -   07/29/17 0200 136/65 - - 95 21 93 % -   07/29/17 0100 133/65 - - 94 24 94 % -   07/29/17 0000 128/71 102  F (38.9  C) Axillary 99 21 94 % 98.2 kg (216 lb 7.9 oz)   07/28/17 2300 138/73 - - 101 20 92 % -   07/28/17 2200 162/83 - - 110 28 93 % -   07/28/17 2100 131/67 - - 92 17 98 % -   07/28/17 2000 136/73 100.6  F (38.1  C) Axillary 97 28 98 % -   07/28/17 1900 133/67 - - 90 19 97 % -   07/28/17 1800 128/65 - - 95 20 97 % -   07/28/17 1700 128/61 - - 93 15 97 % -   07/28/17 1600 133/75 98.7  F (37.1  C) Oral 94 16 99 % -   07/28/17 1500 130/70 - - 90 18 97 % -   07/28/17 1400 - - - 105 (!) 33 94 % -   07/28/17 1300 135/76 - - 98 25 99 % -   07/28/17 1200 128/74 99.4  F (37.4  C) Axillary 96 16 98 % -     General Appearance: NAD  Skin: normal, warm, dry  Heart: RRR  Lungs: Extubated this AM  Abdomen:soft, less distended. Midline incision, c/d/i. Chevron incision well healed.   : vazquez is present, small amount UO.   Extremities: BLE mild edema.  Neurologic: A&O   CVC    Data:   CMP    Recent Labs  Lab 07/29/17  0336 07/28/17  1648  07/27/17  0410  07/26/17  2133 07/26/17  2046  07/25/17  1651 07/25/17  1335   * 146*  < > 142  < > 139 139  < > 138  --    POTASSIUM 3.6 3.8  < > 3.5  < > 3.5  3.5  < > 4.4  --    CHLORIDE 118* 115*  < > 108  < >  --   --   < > 106  --    CO2 21 20  < > 20  < >  --   --   < > 24  --    * 136*  < > 164*  < > 155* 151*  < > 176*  --    BUN 64* 70*  < > 72*  < >  --   --   < > 79*  --    CR 1.54* 1.82*  < > 2.82*  < >  --   --   < > 2.90*  --    GFRESTIMATED 48* 39*  < > 24*  < >  --   --   < > 23*  --    GFRESTBLACK 58* 47*  < > 29*  < >  --   --   < > 28*  --    JACOB 7.8* 8.1*  < > 7.9*  < >  --   --   < > 8.0*  --    ICAW  --   --   --   --   --  4.4 4.3*  < >  --   --    MAG 2.5* 2.5*  < > 2.0  --   --   --   < > 1.9  --    PHOS 4.7* 5.5*  < > 5.8*  --   --   --   < > 4.8*  --    ALBUMIN  --   --   --  2.4*  --   --   --   --  2.0*  --    BILITOTAL  --   --   --  1.3  --   --   --   --  0.6  --    ALKPHOS  --   --   --  143  --   --   --   --  242*  --    AST  --   --   --  27  --   --   --   --  61*  --    ALT  --   --   --  27  --   --   --   --  50  --    FAMY  --   --   --   --   --   --   --   --   --  8   < > = values in this interval not displayed.  CBC    Recent Labs  Lab 07/29/17  0336 07/28/17  0410   HGB 8.1* 8.0*   WBC 6.3 9.3   * 128*     COAGS    Recent Labs  Lab 07/26/17 2243 07/26/17 2017 07/26/17  1620   INR 1.31* 1.31* 1.30*   PTT  --  39* 44*      Urinalysis  Recent Labs   Lab Test  07/28/17   1042  07/25/17   1205   06/14/17   1508   04/11/16   1345   COLOR  Yellow  Dark Yellow   < >   --    < >  Yellow   APPEARANCE  Slightly Cloudy  Cloudy   < >   --    < >  Clear   URINEGLC  Negative  70*   < >   --    < >  30*   URINEBILI  Negative  Negative   < >   --    < >  Negative   URINEKETONE  Negative  Negative   < >   --    < >  Negative   SG  1.012  1.014   < >   --    < >  1.016   UBLD  Trace*  Moderate*   < >   --    < >  Small*   URINEPH  5.0  5.0   < >   --    < >  5.0   PROTEIN  30*  100*   < >   --    < >  30*   NITRITE  Negative  Negative   < >   --    < >  Negative   LEUKEST  Negative  Trace*   < >   --    < >  Negative   RBCU  5*   ">182*   < >   --    < >  1   WBCU  6*  5*   < >   --    < >  1   UTPG   --    --    --   1.55*   --   0.41*    < > = values in this interval not displayed.          7/21/2017   SPECIMEN(S):   A: Colon biopsy, ascending   B: Colon biopsy, descending     FINAL DIAGNOSIS:   A. COLON BIOPSY, ASCENDING:   - Colonic mucosa with no significant histologic abnormality   - Negative for intraepithelial lymphocytosis or deposition of   subepithelial thick collagenous band     B. COLON BIOPSY, DESCENDING:   - Crypt architecture distortion, consistent with healed prior injury   - Negative for active inflammation or dysplasia   - Negative for intraepithelial lymphocytosis or deposition of   subepithelial thick collagenous band   - Please, see comment     COMMENT:   Specimen B, \"descending colon\" features lamina propria edema, slight   variation in crypt sizes and shapes and occasional crypt drop-out.  This   may indicate a resolving injury which could be drug-induced or   infectious.     CT scan 7/25/2017:   IMPRESSION:   1. New large heterogeneous area of right-sided retroperitoneal gas  which is highly concerning for an infectious process. Given the  history of prior neutropenic colitis and biopsies, there could also be  a component of stool from a ruptured viscus, despite the lack of  surrounding bowel loops and no extraluminal oral contrast. Surgical  consultation is recommended.      2. Bibasilar atelectasis versus consolidation with small bilateral  pleural effusions.     3. Extensive mesenteric and soft tissue edema with large volume  ascites. Numerous mesenteric and retroperitoneal lymph nodes which are  presumably reactive.     4. Postsurgical changes of liver transplant.     [Urgent Result: Retroperitoneal gas]     Finding was identified on 7/25/2017 5:34 PM.      Dr. Santoyo was contacted by Dr. Atkins at 7/25/2017 5:37 PM and  verbalized understanding of the urgent finding.      I have personally reviewed the " examination and initial interpretation  and I agree with the findings.     LUCI HOROWITZ MD

## 2017-07-29 NOTE — OP NOTE
SURGEON: Sara Dinh MD     ASSISTANT: Lawson Floyd MD and Karla Kraus MD Surgery Residents.    PREOPERATIVE DIAGNOSIS: Retroperitoneal pericolonic air with signs of sepsis. History of colitis of unclear etiology. Recent colonoscopy with biopsies.    POSTOPERATIVE DIAGNOSIS: Retroperitoneal pericolonic air with signs of sepsis. History of colitis of unclear etiology. Recent colonoscopy with biopsies. No neida perforation and no colon ischemia. Indurated ascending colon.    PROCEDURE: Exploratory laparotomy, lysis of adhesions, right hemicolectomy, end ileostomy, mucous fistula, partial omentectomy.    INDICATIONS: Camacho Bhagat is a 51 year old male who has had a liver transplant and a laparotomy about 3 weeks ago with significant colitis that was treated conservatively. He did have a colonoscopy several days ago with multiple biopsies. He has become ill and no responded properly to treatment with supportive care including antibiotics. The risks and benefits of surgery were thoroughly discussed with the patient's wife since he is currently intubated and she agreed to proceed.     DESCRIPTION OF PROCEDURE: The patient was brought to the operating room, placed supine on the operating room table. We prepped and draped the abdomen in the usual sterile fashion.     We began the procedure with a timeout and incised the midline at the previous laparotomy site. The dissection was carried out with sharp dissection, using Metzenbaum scissors, taking care to preserve both limbs of small intestine. Eventually we gained access to the abdominal cavity and were able to take down associated adhesions. The tissue was very edematous and there was some ascites consistent with his associated liver disease. The peritoneal fluid was clear and it was sent for culture. We turned our attention to the small bowel and carefully freed this. There were multiple inter-loop adhesions. Some were moderate to dense. We were able to  identify the terminal ileum and divided this with the KELLI blue load stapler. The right colon was very indurated and the right colon was very stuck at the hepatic flexure. We were able to identify an area along the mid transverse colon which was reasonable in consistency in the tissue distally. We used this as our target point and divided with the KELLI blue load stapler. We then turned towards dissecting out the ascending colon. The was a difficult and tedious dissection which was a combination of staying very close to the intestine along the mesentery and freeing the intestine and mesentery slowly and deliberately. The plains were very abnormal and instead of going near any areas of risk, we stayed safe near the intestine. There was some deserosalization with the dissection but overall, this resulted in a satisfactory dissection and good hemostasis. The right-sided omentum was separately removed as it was devascularized in the process and sent for permanent. After the right colon The terminal ileum was examined and there was good mobility. We created an aperture in the right rectus for the stoma using a muscle-splitting technique. The terminal ileum was delivered. A 19 [place] jie TABITHA was placed in the right upper quadrant and brought out the left and secured with a 3-0 Nylon. The mucous fistula was placed in the upper midline wound and secured to the fascia with 0-Vicyle. The midline was closed with figure of 8 #1 PDS and the skin closed with staples. The mucous fistula was matured with 3-0 Vicryl and the end ileostomy was matured in a Giovana fashion with 3-0 Vicryl.  Dressings and an appliance were placed. At the end the case, all instruments and sponge counts were correct x2. The patient was emerged from anesthesia and taken to postoperative anesthesia care unit in good condition.     SPECIMEN: Omentum, right colon and terminal ileum, peritoneal fluid.     ESTIMATED BLOOD LOSS: See anesthesia record.     DRAINS:  19 Neri Stanford JP.     URINE OUTPUT: See anesthesia record.     LISA CORNELIUS MD   Colon and Rectal Surgery Staff  [facility]

## 2017-07-29 NOTE — PROGRESS NOTES
Nephrology attending    S: Pt reports significant discomfort due to thirst as expected given Na of 149. Otherwise doing ok without N/V/F/C/CP/SOB/dysuria. 4pt ROS negative.    O:   T102   P98   R20   133/65   140/73   95% on 2L   UOP 2605  Gen - nad  HENT - neck supple  CV - rrr, no rub  Resp - cta anteriorly  Abd - BS+, NT/ND  Ext - trace edema    Labs noted  Na 149 < 144  Cr 1.5 < 2.2 < 2.8    Medications reviewed    A/Rec: 50yo with recovering EJ.  EJ - ATN vs increased abdominal pressure. Recovering nicely. Baseline Cr on 7/3 was 1.1. Hopeful for full recovery.   - avoid NSAIDs    Hypernatremia - due to urine loss and decreased intake. Free water deficit to achieve a Na of 142 is 2.9L. Also anticipate ~0.5L insensible losses and approximately 1L free water loss in urine. Ordered urine osm, Na, and K to calculate electrolyte free water clearance (Berl 2008). Total free water required over next 24hr approximately 4.5L. Recommend:   - 150-200cc free water per hour   - target Na tomorrow AM of 142   - check Na every 6hr - target decrease in Na of 1-2mEq every 6hr   - adjust free water to achieve target    Anemia - stable, mgmt per primary team    Electrolytes - mild hypokalemia (replete per protocol)    Isidro Jones MD  [phone]

## 2017-07-29 NOTE — PROGRESS NOTES
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 Readmitted to ICU 7/26 due to respiratory failure and reintubated. CT scan showing retroperitoneal air concerning for perforation.     Summary of Events  7/4 negative ex-lap  7/5 washout and closure  7/9 pigtail catheter placed (removed 7/12)  7/12 Bronchoscopy  7/14 Paracentesis, negative SBP  7/17 Transfer to floor  7/21 Colonoscopy with biopsies taken  7/25 Transfer to ICU due to respiratory failure, retroperitoneal air  7/26 RTOR, ex-lap, right hemicolectomy with end-ileostomy and mucus fistula        PLAN:     Changes/Events 7/29:  - Extubated  - Per SLP: nectar thick diet, ADAT, SLP will follow. Keep NJ tube with TF for now. Considering pulling NJ if tolerating diet 7/30.  - increased FWF to 60q1h  - 4PM sodium check  - Likely transfer to floor today      Neuro/ pain/ sedation:  - Monitor neurological status. Notify the MD for any acute changes in exam.  - oxycodone and Dilaudid PRN.  - hallucinations: hold off of antipsychotic as patient not agitated. If becomes agitated consider seroquel     CV:   #Sinus tachycardia without hypotension   - No need for pressors at this time  - Continuous cardiac monitoring.   #History of Hypertension  - Holding PTA anti-hypertensives      Pulm:   #Acute respiratory failure  #Bilateral pleural effusions, pulmonary edema  - Extubated, doing well  - PRN Duo-nebs      FEN/ GI:   #Free air in retroperitoneum s/p right hemicolectomy, end ileostomy, mucosa fistula  # Liver failure with continued ascites loss  - Follow drain output, replace with albumin  - Follow ostomy output, follow for need to  replace/add fiber  - Keep drain for 5 days per colorectal  #Need for nutritional supplementation  - ICU electrolyte replacement protocol  - Abdominal x-ray to confirm placement  - Advance tube feeds  - Per SLP: nectar thick diet    Graft Function/Immunosuppression:  #s/p Liver transplant  - Unremarkable liver ultrasound  #Need for immunosuppression  - CellCept 250 mg BID: Held since 6/27/17 due to neutropenia..   - Prograf held per transplant due to EJ    Renal/fluids:  - Vazquez catheter to stay in place for strict intake and output monitoring  - TKO  # EJ  # Intraabdominal hypertension with concern for compartment syndome  - Low urine output, vazquez to remain for strict I&O measurement  - Creatinine elevated from 1.6 baseline  - No indications for dialysis at this time  - Nephrology consulted  - increased FWF to 60q1h  - Monitor electrolytes  - continue scheduled albumin 25g q6h to replace drain fluid losses, renew order 7/29 if needed after reassessment      Endocrine:   #Diabetes mellitus T2  - Insulin gtt      ID/ Abx:  #Severe Sepsis, concern for abdominal source  #Fever  - Fever w/u: UA, CXR, Blood Cx  - Meropenem, Daptomycin, Micafungin IV per ID  - transplant ID following, appreciates recs  - EBV PCR every other week.   #CMV viremia, +Donor/-recipient transplant  - CMV D+R-, low viral count. MMF held.   - Increase IV Ganciclovir 7/25/2017 per ID (started 07/20).   - Repeat CMV PCR 7/27/2017.       Heme: Hemoglobin stable. Will recheck in AM or earlier if there is clinical evidence of active bleeding.  - Daily CBC. 2PM Hb recheck  # Right brachial arterial clot from art line  - Daily ASA  # Distal extremity ischemia secondary to pressors  - Monitor for signs of infection       Prophylaxis:    - DVT: SCDs, SubQ Heparin  - GI: Protonix       MSK:  - PT/OT      Lines/Drains:  - R CVC, PIV, Vazquez, NJ, OG  - May need to change R CVC if bacteremic      Disposition: Surgical ICU      Patient seen, findings and plan  discussed with surgical ICU staff, Dr. Uribe.     Arden Page MD  PGY-2 General Surgery    ====================================    TODAY'S PROGRESS:   SUBJECTIVE:   - No acute events overnight. Extubated yesterday. Patient intermittently having hallucinations per nursing report but remains pleasant. Febrile again overnight to 102, cultures drawn yesterday.    OBJECTIVE:   1. VITAL SIGNS:   Temp:  [98.7  F (37.1  C)-102.2  F (39  C)] 102.2  F (39  C)  Heart Rate:  [] 102  Resp:  [15-33] 24  BP: (128-162)/(61-83) 140/73  SpO2:  [92 %-100 %] 95 %  Ventilation Mode: CPAP/PS  FiO2 (%): 40 %  Rate Set (breaths/minute): 12 breaths/min  Tidal Volume Set (mL): 500 mL  PEEP (cm H2O): 5 cmH2O  Pressure Support (cm H2O): 7 cmH2O  Oxygen Concentration (%): 40 %  Resp: 24    2. INTAKE/ OUTPUT:   I/O last 3 completed shifts:  In: 3101.92 [I.V.:1611.92; NG/GT:690]  Out: 6990 [Urine:2645; Drains:970; Stool:3375]    3. PHYSICAL EXAMINATION:   Gen: Extubated  HEENT: MMM  CVS: NR/RR, no murmurs noted.  Pulm: CTA bilaterally.  Abd: soft, non-tender, distended, no guarding. Right colostomy with thin bilious output and some thicker liquids. Mucous fistula at superior midline incision.  Ext: warm, well-perfused; peripheral edema 2+, distal pulses b/l. Dusky tips on the right index and right large toe . Staples in place for midline incision.  - Incision: C/D/I without surrounding erythema, drainage, or fluctuance; staples in place for midline incision. Suture in place in LLQ at prior paracentesis site c/d/i.    4. INVESTIGATIONS:   Arterial Blood Gases     Recent Labs  Lab 07/26/17  2243 07/26/17  2017 07/25/17  1746 07/25/17  1037   PH Incorrect specimen typeNOTTIED BY YASHIRA SHARMA TO REPLACE.7/26/17 AT 2346 BY ZAINAB.CORRECTED ON 07/26 AT 2350: PREVIOUSLY REPORTED AS 7.27 Canceled, Test credited Incorrect specimen type 7.32* 7.14*   PCO2 Incorrect specimen typeNOTTIED BY WOJCIECH DEWITT, NEW ORDER TO REPLACE.7/26/17 AT  2346 BY ZAINAB.CORRECTED ON 07/26 AT 2350: PREVIOUSLY REPORTED AS 45 Canceled, Test credited Incorrect specimen type 42 69*   PO2 Incorrect specimen typeNOTTIED BY WOJCIECH DEWITT, NEW ORDER TO REPLACE.7/26/17 AT 2346 BY ZAINAB.CORRECTED ON 07/26 AT 2350: PREVIOUSLY REPORTED AS 53 Canceled, Test credited Incorrect specimen type 81 103   HCO3 Incorrect specimen typeNOTTIED BY WOJCIECH DEWITT, NEW ORDER TO REPLACE.7/26/17 AT 2346 BY ZAINAB.CORRECTED ON 07/26 AT 2350: PREVIOUSLY REPORTED AS 20 Canceled, Test credited Incorrect specimen type 22 23     Complete Blood Count     Recent Labs  Lab 07/29/17  0336 07/28/17  0410 07/27/17  0410 07/26/17  2243   WBC 6.3 9.3 10.0 10.3   HGB 8.1* 8.0* 9.3* 9.1*   * 128* 144* 136*     Basic Metabolic Panel    Recent Labs  Lab 07/29/17  0336 07/28/17  1648 07/28/17  0410 07/27/17  2110   * 146* 144 143   POTASSIUM 3.6 3.8 3.8 3.4   CHLORIDE 118* 115* 112* 111*   CO2 21 20 18* 23   BUN 64* 70* 71* 77*   CR 1.54* 1.82* 2.15* 2.45*   * 136* 161* 66*     Liver Function Tests    Recent Labs  Lab 07/27/17 0410 07/26/17 2243 07/26/17 2017 07/26/17  1620 07/25/17  1651 07/25/17  0945 07/24/17  0705   AST 27  --   --   --  61* 90* 86*   ALT 27  --   --   --  50 60 54   ALKPHOS 143  --   --   --  242* 317* 264*   BILITOTAL 1.3  --   --   --  0.6 0.5 0.4   ALBUMIN 2.4*  --   --   --  2.0* 1.9* 1.7*   INR  --  1.31* 1.31* 1.30* 1.23*  --  1.11     Pancreatic Enzymes  No lab results found in last 7 days.  Coagulation Profile    Recent Labs  Lab 07/26/17 2243 07/26/17 2017 07/26/17  1620 07/25/17  1651   INR 1.31* 1.31* 1.30* 1.23*   PTT  --  39* 44* 41*     Lactate  Invalid input(s): LACTATE    5. RADIOLOGY:   Recent Results (from the past 24 hour(s))   XR Abdomen Port 1 View    Narrative    EXAM: XR ABDOMEN PORT F1 VW  7/28/2017 9:42 AM      HISTORY: NJT positioning after extubation today    COMPARISON: 7/27/2017    FINDINGS: Feeding tube tip over duodenojejunal flexure.  Bibasilar  airspace opacities, partially evaluated. IVC filter. Right upper  quadrant surgical clips and drain. Nonobstructive bowel gas pattern.       Impression    IMPRESSION:   1. Feeding tube tip over the duodenojejunal flexure.  2. Bibasilar airspace opacities, partially evaluated.     I have personally reviewed the examination and initial interpretation  and I agree with the findings.    DIAZ NORMAN MD   XR Chest Port 1 View    Narrative    EXAM: XR CHEST PORT 1 VW  7/28/2017 11:29 AM      HISTORY: persistent fever. Evalute for infectious sources    COMPARISON: Chest radiograph 7/27/2017    FINDINGS:     Single AP view of the chest. Interval removal of the endotracheal  tube. Stable right IJ central venous catheter projects over the low  SVC. Enteric tube with the tip collimated. The second enteric tube has  been removed.    The cardiac silhouette is obscured. Increased perihilar opacities.     Increased bilateral moderate pleural effusions with overlying  atelectasis/consolidation. No pneumothorax.     Surgical clips project over the right upper quadrant.      Impression    IMPRESSION:   1.  Interval removal of the endotracheal tube.  2.  Increased perihilar opacities, worsening infection versus  pulmonary edema.   3.  Increased bilateral moderate pleural effusions with overlying  atelectasis/consolidation.     I have personally reviewed the examination and initial interpretation  and I agree with the findings.    DALLIN RENAE MD       =========================================

## 2017-07-29 NOTE — PLAN OF CARE
Problem: Goal Outcome Summary  Goal: Goal Outcome Summary  PT 4A: patient oriented to self and place, remains confused but cooperative.  Required mod assist to transfer to EOB, stand and take a few steps to chair.  Mobility limited by pain and deconditioning.      Anticipate need for rehab stay when stable.

## 2017-07-29 NOTE — PLAN OF CARE
Problem: Goal Outcome Summary  Goal: Goal Outcome Summary  Outcome: No Change  D: Tmax 102. Sinus rhythm with brief period of sinus tachycardia. Normotensive. On 2L NC sats mid 90s. A&O to self only.   I/A: CVP 6 and 5. 650mg PRN tylenol given for fever. PRN 5mg oxy given for pain management. UOP 70-115cc/hr. Ileostomy output ranging from 75-225cc/hr. 30-60cc/hr out of TABITHA drain. Tube feeds advanced up to 50, will be advanced to 60 which is goal at noon today. 20K replacement given x2 this shift. Free water flushes increased to 60cc q1 hour this morning due to rising sodium. Sputum sample sent. Continues on insulin gtt.   P: Continue to monitor pt. Consult SICU team with any changes/concerns.

## 2017-07-29 NOTE — PLAN OF CARE
Problem: Individualization  Goal: Patient Preferences  Outcome: Improving  Extubated this morning without complication.  Neuro: Oriented to self.  Overall weakness.  CV: NSR, BP stable.  Resp: 4L, coarse but improving since AM.  GI: TF at 30 with goal of 60, Illiostomy working well.  : good urine output with vazquez.  Incision: Team reminded about dressing change.     Plan:  Continue to monitor for infection.  Promote pulmonary toileting.

## 2017-07-30 ENCOUNTER — APPOINTMENT (OUTPATIENT)
Dept: SPEECH THERAPY | Facility: CLINIC | Age: 52
End: 2017-07-30
Attending: TRANSPLANT SURGERY
Payer: COMMERCIAL

## 2017-07-30 ENCOUNTER — APPOINTMENT (OUTPATIENT)
Dept: PHYSICAL THERAPY | Facility: CLINIC | Age: 52
End: 2017-07-30
Attending: TRANSPLANT SURGERY
Payer: COMMERCIAL

## 2017-07-30 LAB
ANION GAP SERPL CALCULATED.3IONS-SCNC: 8 MMOL/L (ref 3–14)
ANION GAP SERPL CALCULATED.3IONS-SCNC: 9 MMOL/L (ref 3–14)
BACTERIA SPEC CULT: NO GROWTH
BASOPHILS # BLD AUTO: 0 10E9/L (ref 0–0.2)
BASOPHILS NFR BLD AUTO: 0.2 %
BUN SERPL-MCNC: 55 MG/DL (ref 7–30)
BUN SERPL-MCNC: 60 MG/DL (ref 7–30)
CALCIUM SERPL-MCNC: 7.9 MG/DL (ref 8.5–10.1)
CALCIUM SERPL-MCNC: 8.1 MG/DL (ref 8.5–10.1)
CHLORIDE SERPL-SCNC: 118 MMOL/L (ref 94–109)
CHLORIDE SERPL-SCNC: 121 MMOL/L (ref 94–109)
CO2 SERPL-SCNC: 19 MMOL/L (ref 20–32)
CO2 SERPL-SCNC: 20 MMOL/L (ref 20–32)
CREAT SERPL-MCNC: 1.06 MG/DL (ref 0.66–1.25)
CREAT SERPL-MCNC: 1.1 MG/DL (ref 0.66–1.25)
DIFFERENTIAL METHOD BLD: ABNORMAL
EOSINOPHIL # BLD AUTO: 0.1 10E9/L (ref 0–0.7)
EOSINOPHIL NFR BLD AUTO: 1.5 %
ERYTHROCYTE [DISTWIDTH] IN BLOOD BY AUTOMATED COUNT: 16.8 % (ref 10–15)
GFR SERPL CREATININE-BSD FRML MDRD: 70 ML/MIN/1.7M2
GFR SERPL CREATININE-BSD FRML MDRD: 73 ML/MIN/1.7M2
GLUCOSE BLDC GLUCOMTR-MCNC: 108 MG/DL (ref 70–99)
GLUCOSE BLDC GLUCOMTR-MCNC: 109 MG/DL (ref 70–99)
GLUCOSE BLDC GLUCOMTR-MCNC: 119 MG/DL (ref 70–99)
GLUCOSE BLDC GLUCOMTR-MCNC: 121 MG/DL (ref 70–99)
GLUCOSE BLDC GLUCOMTR-MCNC: 128 MG/DL (ref 70–99)
GLUCOSE BLDC GLUCOMTR-MCNC: 131 MG/DL (ref 70–99)
GLUCOSE BLDC GLUCOMTR-MCNC: 134 MG/DL (ref 70–99)
GLUCOSE BLDC GLUCOMTR-MCNC: 137 MG/DL (ref 70–99)
GLUCOSE BLDC GLUCOMTR-MCNC: 139 MG/DL (ref 70–99)
GLUCOSE BLDC GLUCOMTR-MCNC: 140 MG/DL (ref 70–99)
GLUCOSE BLDC GLUCOMTR-MCNC: 144 MG/DL (ref 70–99)
GLUCOSE BLDC GLUCOMTR-MCNC: 150 MG/DL (ref 70–99)
GLUCOSE BLDC GLUCOMTR-MCNC: 155 MG/DL (ref 70–99)
GLUCOSE BLDC GLUCOMTR-MCNC: 156 MG/DL (ref 70–99)
GLUCOSE BLDC GLUCOMTR-MCNC: 159 MG/DL (ref 70–99)
GLUCOSE BLDC GLUCOMTR-MCNC: 160 MG/DL (ref 70–99)
GLUCOSE BLDC GLUCOMTR-MCNC: 161 MG/DL (ref 70–99)
GLUCOSE BLDC GLUCOMTR-MCNC: 162 MG/DL (ref 70–99)
GLUCOSE BLDC GLUCOMTR-MCNC: 163 MG/DL (ref 70–99)
GLUCOSE BLDC GLUCOMTR-MCNC: 190 MG/DL (ref 70–99)
GLUCOSE BLDC GLUCOMTR-MCNC: 194 MG/DL (ref 70–99)
GLUCOSE SERPL-MCNC: 155 MG/DL (ref 70–99)
GLUCOSE SERPL-MCNC: 160 MG/DL (ref 70–99)
HCT VFR BLD AUTO: 25.8 % (ref 40–53)
HGB BLD-MCNC: 8.2 G/DL (ref 13.3–17.7)
IMM GRANULOCYTES # BLD: 0 10E9/L (ref 0–0.4)
IMM GRANULOCYTES NFR BLD: 0.6 %
LACTATE BLD-SCNC: 0.6 MMOL/L (ref 0.7–2.1)
LYMPHOCYTES # BLD AUTO: 1.6 10E9/L (ref 0.8–5.3)
LYMPHOCYTES NFR BLD AUTO: 30.3 %
MAGNESIUM SERPL-MCNC: 2.1 MG/DL (ref 1.6–2.3)
MAGNESIUM SERPL-MCNC: 2.2 MG/DL (ref 1.6–2.3)
MCH RBC QN AUTO: 28.3 PG (ref 26.5–33)
MCHC RBC AUTO-ENTMCNC: 31.8 G/DL (ref 31.5–36.5)
MCV RBC AUTO: 89 FL (ref 78–100)
MICRO REPORT STATUS: NORMAL
MONOCYTES # BLD AUTO: 0.3 10E9/L (ref 0–1.3)
MONOCYTES NFR BLD AUTO: 5 %
NEUTROPHILS # BLD AUTO: 3.2 10E9/L (ref 1.6–8.3)
NEUTROPHILS NFR BLD AUTO: 62.4 %
NRBC # BLD AUTO: 0 10*3/UL
NRBC BLD AUTO-RTO: 0 /100
PHOSPHATE SERPL-MCNC: 2.3 MG/DL (ref 2.5–4.5)
PHOSPHATE SERPL-MCNC: 2.6 MG/DL (ref 2.5–4.5)
PLATELET # BLD AUTO: 125 10E9/L (ref 150–450)
POTASSIUM SERPL-SCNC: 3.8 MMOL/L (ref 3.4–5.3)
POTASSIUM SERPL-SCNC: 4.1 MMOL/L (ref 3.4–5.3)
RBC # BLD AUTO: 2.9 10E12/L (ref 4.4–5.9)
SODIUM SERPL-SCNC: 147 MMOL/L (ref 133–144)
SODIUM SERPL-SCNC: 148 MMOL/L (ref 133–144)
SPECIMEN SOURCE: NORMAL
TACROLIMUS BLD-MCNC: ABNORMAL UG/L (ref 5–15)
TME LAST DOSE: ABNORMAL H
WBC # BLD AUTO: 5.2 10E9/L (ref 4–11)

## 2017-07-30 PROCEDURE — 83605 ASSAY OF LACTIC ACID: CPT | Performed by: INTERNAL MEDICINE

## 2017-07-30 PROCEDURE — 25000132 ZZH RX MED GY IP 250 OP 250 PS 637: Performed by: STUDENT IN AN ORGANIZED HEALTH CARE EDUCATION/TRAINING PROGRAM

## 2017-07-30 PROCEDURE — 36415 COLL VENOUS BLD VENIPUNCTURE: CPT | Performed by: TRANSPLANT SURGERY

## 2017-07-30 PROCEDURE — 97530 THERAPEUTIC ACTIVITIES: CPT | Mod: GP

## 2017-07-30 PROCEDURE — 12000025 ZZH R&B TRANSPLANT INTERMEDIATE

## 2017-07-30 PROCEDURE — 36415 COLL VENOUS BLD VENIPUNCTURE: CPT | Performed by: INTERNAL MEDICINE

## 2017-07-30 PROCEDURE — 25000131 ZZH RX MED GY IP 250 OP 636 PS 637: Performed by: TRANSPLANT SURGERY

## 2017-07-30 PROCEDURE — 85025 COMPLETE CBC W/AUTO DIFF WBC: CPT | Performed by: TRANSPLANT SURGERY

## 2017-07-30 PROCEDURE — 25000128 H RX IP 250 OP 636: Performed by: STUDENT IN AN ORGANIZED HEALTH CARE EDUCATION/TRAINING PROGRAM

## 2017-07-30 PROCEDURE — 25000125 ZZHC RX 250: Performed by: TRANSPLANT SURGERY

## 2017-07-30 PROCEDURE — 25000132 ZZH RX MED GY IP 250 OP 250 PS 637: Performed by: SURGERY

## 2017-07-30 PROCEDURE — 25000128 H RX IP 250 OP 636: Performed by: PHYSICIAN ASSISTANT

## 2017-07-30 PROCEDURE — 40000225 ZZH STATISTIC SLP WARD VISIT: Performed by: SPEECH-LANGUAGE PATHOLOGIST

## 2017-07-30 PROCEDURE — 27210913 ZZH NUTRITION PRODUCT INTERMEDIATE PACKET

## 2017-07-30 PROCEDURE — 80048 BASIC METABOLIC PNL TOTAL CA: CPT | Performed by: TRANSPLANT SURGERY

## 2017-07-30 PROCEDURE — 00000146 ZZHCL STATISTIC GLUCOSE BY METER IP

## 2017-07-30 PROCEDURE — 25000128 H RX IP 250 OP 636: Performed by: TRANSPLANT SURGERY

## 2017-07-30 PROCEDURE — 80197 ASSAY OF TACROLIMUS: CPT | Performed by: TRANSPLANT SURGERY

## 2017-07-30 PROCEDURE — 25000132 ZZH RX MED GY IP 250 OP 250 PS 637: Performed by: TRANSPLANT SURGERY

## 2017-07-30 PROCEDURE — 84100 ASSAY OF PHOSPHORUS: CPT | Performed by: TRANSPLANT SURGERY

## 2017-07-30 PROCEDURE — 83735 ASSAY OF MAGNESIUM: CPT | Performed by: TRANSPLANT SURGERY

## 2017-07-30 PROCEDURE — 25000131 ZZH RX MED GY IP 250 OP 636 PS 637: Performed by: PHYSICIAN ASSISTANT

## 2017-07-30 PROCEDURE — 40000193 ZZH STATISTIC PT WARD VISIT

## 2017-07-30 PROCEDURE — 92526 ORAL FUNCTION THERAPY: CPT | Mod: GN | Performed by: SPEECH-LANGUAGE PATHOLOGIST

## 2017-07-30 PROCEDURE — 27210432 ZZH NUTRITION PRODUCT RENAL BASIC LITER

## 2017-07-30 PROCEDURE — 25000125 ZZHC RX 250: Performed by: SURGERY

## 2017-07-30 RX ORDER — LOPERAMIDE HYDROCHLORIDE 1 MG/5ML
2 SOLUTION ORAL 4 TIMES DAILY
Status: DISCONTINUED | OUTPATIENT
Start: 2017-07-30 | End: 2017-08-03

## 2017-07-30 RX ADMIN — LOPERAMIDE HYDROCHLORIDE 2 MG: 1 SOLUTION ORAL at 08:47

## 2017-07-30 RX ADMIN — LOPERAMIDE HYDROCHLORIDE 2 MG: 1 SOLUTION ORAL at 12:15

## 2017-07-30 RX ADMIN — LOPERAMIDE HYDROCHLORIDE 2 MG: 1 SOLUTION ORAL at 20:33

## 2017-07-30 RX ADMIN — MEROPENEM 1 G: 1 INJECTION, POWDER, FOR SOLUTION INTRAVENOUS at 04:00

## 2017-07-30 RX ADMIN — Medication 1 PACKET: at 08:48

## 2017-07-30 RX ADMIN — HUMAN INSULIN 2 UNITS/HR: 100 INJECTION, SOLUTION SUBCUTANEOUS at 22:45

## 2017-07-30 RX ADMIN — GANCICLOVIR SODIUM 450 MG: 500 INJECTION, POWDER, LYOPHILIZED, FOR SOLUTION INTRAVENOUS at 07:49

## 2017-07-30 RX ADMIN — PANTOPRAZOLE SODIUM 40 MG: 40 TABLET, DELAYED RELEASE ORAL at 08:47

## 2017-07-30 RX ADMIN — HUMAN INSULIN 6 UNITS/HR: 100 INJECTION, SOLUTION SUBCUTANEOUS at 15:57

## 2017-07-30 RX ADMIN — HEPARIN SODIUM 5000 UNITS: 5000 INJECTION, SOLUTION INTRAVENOUS; SUBCUTANEOUS at 08:47

## 2017-07-30 RX ADMIN — HEPARIN SODIUM 5000 UNITS: 5000 INJECTION, SOLUTION INTRAVENOUS; SUBCUTANEOUS at 15:58

## 2017-07-30 RX ADMIN — TACROLIMUS 3 MG: 5 CAPSULE ORAL at 20:33

## 2017-07-30 RX ADMIN — INSULIN ASPART 4 UNITS: 100 INJECTION, SOLUTION INTRAVENOUS; SUBCUTANEOUS at 13:06

## 2017-07-30 RX ADMIN — Medication 80 MG: at 08:47

## 2017-07-30 RX ADMIN — MICAFUNGIN SODIUM 100 MG: 10 INJECTION, POWDER, LYOPHILIZED, FOR SOLUTION INTRAVENOUS at 13:09

## 2017-07-30 RX ADMIN — Medication 1 PACKET: at 22:39

## 2017-07-30 RX ADMIN — Medication 1 PACKET: at 13:11

## 2017-07-30 RX ADMIN — MULTIVIT AND MINERALS-FERROUS GLUCONATE 9 MG IRON/15 ML ORAL LIQUID 15 ML: at 08:47

## 2017-07-30 RX ADMIN — ACETAMINOPHEN 650 MG: 325 TABLET, FILM COATED ORAL at 08:47

## 2017-07-30 RX ADMIN — HUMAN INSULIN 1.5 UNITS/HR: 100 INJECTION, SOLUTION SUBCUTANEOUS at 00:36

## 2017-07-30 RX ADMIN — DAPTOMYCIN 750 MG: 500 INJECTION, POWDER, LYOPHILIZED, FOR SOLUTION INTRAVENOUS at 16:02

## 2017-07-30 RX ADMIN — HEPARIN SODIUM 5000 UNITS: 5000 INJECTION, SOLUTION INTRAVENOUS; SUBCUTANEOUS at 00:36

## 2017-07-30 RX ADMIN — MEROPENEM 1 G: 1 INJECTION, POWDER, FOR SOLUTION INTRAVENOUS at 12:15

## 2017-07-30 RX ADMIN — LOPERAMIDE HYDROCHLORIDE 2 MG: 1 SOLUTION ORAL at 15:58

## 2017-07-30 RX ADMIN — MEROPENEM 1 G: 1 INJECTION, POWDER, FOR SOLUTION INTRAVENOUS at 22:35

## 2017-07-30 RX ADMIN — HUMAN INSULIN 4 UNITS/HR: 100 INJECTION, SOLUTION SUBCUTANEOUS at 10:04

## 2017-07-30 RX ADMIN — GANCICLOVIR SODIUM 450 MG: 500 INJECTION, POWDER, LYOPHILIZED, FOR SOLUTION INTRAVENOUS at 20:33

## 2017-07-30 RX ADMIN — TACROLIMUS 3 MG: 5 CAPSULE ORAL at 08:47

## 2017-07-30 NOTE — PLAN OF CARE
Problem: Goal Outcome Summary  Goal: Goal Outcome Summary  SLP: Pt seen bedside for dysphagia f/u.  Pt alert and motivated for session.  Pt continues to exhibit positive s/s of aspiration on trials of thin liquids but demonstrated functional tolerance of soft solid textures.  Recommend advance diet to dysphagia diet level 3 and nectar thin liquids with intermittent supervision.  Pt will need to take small bites/sips, pace self, and alternate consistencies.  ST to follow.

## 2017-07-30 NOTE — PROGRESS NOTES
Immunosuppression Note:    Camacho Bhagat is a 51 year old male who is seen today  for immunosuppression management     I, Jovan Tran MD, I have examined the patient with the resident/PA/Fellow, discussed and agree with the note and findings.  I have reviewed today's vital signs, medications, labs and imaging. I reviewed the immunosuppression medications and levels. 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 Concern for CMV colitis with low count CMV viremia/GI PTLD and subsequently underwent colonoscopy on 7/21, random biopsies obtained.  On 7/25/2017 patient had respiratory distress, abdominal compartment syndrome and EJ with oliguria. Broad spectrum antibiotics were started.  He was intubated and had a paracentesis with 5L removed. He did not required pressors.  CT was obtained which showed a new large heterogeneous right sided retroperitoneal gas and air tracking along duodenum.  No contrast extravasation.  No intraperitoneal air noted. Colorectal surgery was consulted. Initial conservative management with bowel rest and IV abx. Pt continued to spike fevers despite IV abx.  Now s/p Exploratory laparotomy, right angelique-colectomy, end ileostomy, mucus fistula, partial omentectomy on 7/26.    Graft Function: Good function. LFTs WNL. US liver unremarkable.  Immunosuppression Management:   CellCept discontinued (6/27/17) due to neutropenia.   Prograf goal ~8 due to single IS. 7/27 > 24 hr level < 3. Subtherapeutic  despite frequent dose increases, now with increased level. HELD for now due to EJ, restarted 3 mg BID yesterday and check level on Monday.   Cardiorespiratory: Acute respiratory distress with hypercapnia.  Acidotic breathing secondary to sepsis. Improved after control of sepsis. Extubated on Friday.   CXR-pulmonary edema, moderate pleural effusions. CT scan chest, small b/l pleural effusions.  Hematology: Pancytopenia on admission, acute on chronic, secondary to medications (MMF, bactrim, valcyte). Improved with holding medications and neupogen. Possible due to CMV.  Started IV Ganciclovir 7/20.  Continue to hold MMF and bactrim.   -Anemia- HGB 9.3->8.0. Blood transfusion 1 unit 7/18, 4 units 7/26.   -Cytology ascites fluid, negative for malignancy   GI: Neutropenic colitis. Colonoscopy 7/21. Biopsy results showing resolving injury secondary to possible drug induced vs infectious.   -CT scan abd/pelvis, po contrast, showed a new large heterogeneous right sided retroperitoneal gas and air tracking along duodenum.  No contrast extravasation.  No intraperitoneal air noted. Colorectal surgery consulted.  Continued to spike high grade temperature despite IV antibiotics therefore patient taken to OR. s/p Exploratory laparotomy, right angelique-colectomy, end ileostomy, mucus fistula, partial omentectomy on 7/26. Path in process. Findings no neida perforation, phlegmon/inflammationright colon. WOCN consult for ostomy care.   -Honey thickened liquid diet. TF held at that time, now restarted, advancing to goal .   Large output from the iliostomy - started loperamide 2mg q6H.  Fluid/Electrolytes/Renal: EJ oliguric likely sec to high intraabdominal pressures and ischemic ATN sec to sepsis. Nephrology consulted. No indication for RRT presently. UO good, Creatinine 1.1.   Endocrine: DM type 2. On insulin gtt.  Infectious disease:   -Severe sepsis, right colon phlegmon -Ax with meropenem/daptomycin/micafungin. S/p right  angelique-colectomy. Path pending. Fluid cx x 1 negative and 1 in process.  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  CMV discordent with CMV PCR increasing. Continue IV ganciclovir. Follow CMV PCR weekly  -7/25 Ucx, Bcx and ascites cx negative to date  -ID consulting.   Neuro: Toxic metabolic encephalopathy secondary to infection, prolonged hospital stay.  Vascular: Right Arterial line clot 2/2 previous arterial line. Vascular consulted. Recommend ASA only. Some evidence of microvascular injury in digits (toes) this is most likely due to injury while patient was on pressor therapy and sepsis. Now with a third toe injury, vascular consulted again. US completed.  ECHO EF 60-65%. 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   Activity: PT/OT  Prophylaxis: DVT-Heparin SQ  Disposition: To 7A  Medical Decision Making: Medium  Admit [id number] (moderate level decision making)    PILLO/Fellow/Resident Provider: Eric Dacosta  Fellow, Transplant surgery  Pager: [phone]  Faculty: Jovan Tran M.D.  __________________________________________________________________  Transplant History:.  3/4/2017 DD OLT with Dr. Tran (Liver), Postoperative day: 148     Interval History:   Overnight events: Tmax 102 , no complaints, extubated yesterday,   ROS:   A 10-point review of systems was negative except as noted above.    Meds:    loperamide  2 mg Oral or Feeding Tube 4x Daily     meropenem  1 g Intravenous Q8H     ganciclovir (CYTOVENE) intermittent infusion  5 mg/kg (Dosing Weight) Intravenous Q12H     tacrolimus  3 mg Oral BID IS     protein modular  1 packet Per Feeding Tube TID     DAPTOmycin (CUBICIN) intermittent infusion  8 mg/kg (Dosing Weight) Intravenous Q24H     micafungin  100 mg Intravenous Q24H     aspirin  80 mg Oral Daily     multivitamins with minerals  15 mL Per Feeding Tube Daily     heparin  5,000 Units Subcutaneous Q8H     pantoprazole  40 mg Oral or Feeding Tube Daily     sodium  chloride (PF)  3 mL Intracatheter Q8H       Physical Exam:     Admit Weight: 94.2 kg (207 lb 11.2 oz) (SCALE 2)    Current vitals:   /86 (BP Location: Right arm)  Pulse 105  Temp 98.5  F (36.9  C) (Oral)  Resp 24  Ht 1.829 m (6')  Wt 98.2 kg (216 lb 7.9 oz)  SpO2 96%  BMI 29.36 kg/m2    Vital sign ranges:    Temp:  [98.4  F (36.9  C)-100.6  F (38.1  C)] 98.5  F (36.9  C)  Pulse:  [105] 105  Heart Rate:  [] 107  Resp:  [18-32] 24  BP: (132-168)/(68-90) 168/86  SpO2:  [93 %-98 %] 96 %  Patient Vitals for the past 24 hrs:   BP Temp Temp src Pulse Heart Rate Resp SpO2   07/30/17 0859 168/86 - - - 107 24 96 %   07/30/17 0750 167/90 98.5  F (36.9  C) Oral 105 105 28 96 %   07/30/17 0308 147/76 98.4  F (36.9  C) Oral - 103 20 98 %   07/29/17 2010 163/86 99.5  F (37.5  C) Oral - 98 22 96 %   07/29/17 1849 161/77 - - - 103 18 93 %   07/29/17 1800 164/83 - - - 101 30 97 %   07/29/17 1700 156/90 - - - 98 18 96 %   07/29/17 1600 159/84 - - - 92 24 97 %   07/29/17 1500 143/74 - - - 96 (!) 31 97 %   07/29/17 1400 144/75 - - - 98 25 96 %   07/29/17 1300 132/68 - - - 96 23 96 %   07/29/17 1200 162/81 100.6  F (38.1  C) - - 99 (!) 32 -   07/29/17 1100 - - - - 90 19 95 %   07/29/17 1000 151/80 - - - 96 24 96 %     General Appearance: NAD  Skin: normal, warm, dry  Heart: RRR  Lungs: Extubated this AM  Abdomen:soft, less distended. Midline incision, c/d/i. Chevron incision well healed.   : vazquez is present, small amount UO.   Extremities: BLE mild edema.  Neurologic: A&O   CVC    Data:   CMP    Recent Labs  Lab 07/30/17  0605 07/29/17  1715  07/27/17  0410  07/26/17  2133 07/26/17  2046  07/25/17  1651 07/25/17  1335   * 146*  < > 142  < > 139 139  < > 138  --    POTASSIUM 3.8 3.8  < > 3.5  < > 3.5 3.5  < > 4.4  --    CHLORIDE 118* 116*  < > 108  < >  --   --   < > 106  --    CO2 20 21  < > 20  < >  --   --   < > 24  --    * 151*  < > 164*  < > 155* 151*  < > 176*  --    BUN 60* 65*  < > 72*  < >  --    --   < > 79*  --    CR 1.10 1.28*  < > 2.82*  < >  --   --   < > 2.90*  --    GFRESTIMATED 70 59*  < > 24*  < >  --   --   < > 23*  --    GFRESTBLACK 85 71  < > 29*  < >  --   --   < > 28*  --    JACOB 8.1* 7.9*  < > 7.9*  < >  --   --   < > 8.0*  --    ICAW  --   --   --   --   --  4.4 4.3*  < >  --   --    MAG 2.2 2.3  < > 2.0  --   --   --   < > 1.9  --    PHOS 2.6 3.0  < > 5.8*  --   --   --   < > 4.8*  --    ALBUMIN  --   --   --  2.4*  --   --   --   --  2.0*  --    BILITOTAL  --   --   --  1.3  --   --   --   --  0.6  --    ALKPHOS  --   --   --  143  --   --   --   --  242*  --    AST  --   --   --  27  --   --   --   --  61*  --    ALT  --   --   --  27  --   --   --   --  50  --    FAMY  --   --   --   --   --   --   --   --   --  8   < > = values in this interval not displayed.  CBC    Recent Labs  Lab 07/30/17  0605 07/29/17  0336   HGB 8.2* 8.1*   WBC 5.2 6.3   * 121*     COAGS    Recent Labs  Lab 07/26/17  2243 07/26/17 2017 07/26/17  1620   INR 1.31* 1.31* 1.30*   PTT  --  39* 44*      Urinalysis  Recent Labs   Lab Test  07/28/17   1042  07/25/17   1205   06/14/17   1508   04/11/16   1345   COLOR  Yellow  Dark Yellow   < >   --    < >  Yellow   APPEARANCE  Slightly Cloudy  Cloudy   < >   --    < >  Clear   URINEGLC  Negative  70*   < >   --    < >  30*   URINEBILI  Negative  Negative   < >   --    < >  Negative   URINEKETONE  Negative  Negative   < >   --    < >  Negative   SG  1.012  1.014   < >   --    < >  1.016   UBLD  Trace*  Moderate*   < >   --    < >  Small*   URINEPH  5.0  5.0   < >   --    < >  5.0   PROTEIN  30*  100*   < >   --    < >  30*   NITRITE  Negative  Negative   < >   --    < >  Negative   LEUKEST  Negative  Trace*   < >   --    < >  Negative   RBCU  5*  >182*   < >   --    < >  1   WBCU  6*  5*   < >   --    < >  1   UTPG   --    --    --   1.55*   --   0.41*    < > = values in this interval not displayed.          7/21/2017   SPECIMEN(S):   A: Colon biopsy, ascending   B:  "Colon biopsy, descending     FINAL DIAGNOSIS:   A. COLON BIOPSY, ASCENDING:   - Colonic mucosa with no significant histologic abnormality   - Negative for intraepithelial lymphocytosis or deposition of   subepithelial thick collagenous band     B. COLON BIOPSY, DESCENDING:   - Crypt architecture distortion, consistent with healed prior injury   - Negative for active inflammation or dysplasia   - Negative for intraepithelial lymphocytosis or deposition of   subepithelial thick collagenous band   - Please, see comment     COMMENT:   Specimen B, \"descending colon\" features lamina propria edema, slight   variation in crypt sizes and shapes and occasional crypt drop-out.  This   may indicate a resolving injury which could be drug-induced or   infectious.     CT scan 7/25/2017:   IMPRESSION:   1. New large heterogeneous area of right-sided retroperitoneal gas  which is highly concerning for an infectious process. Given the  history of prior neutropenic colitis and biopsies, there could also be  a component of stool from a ruptured viscus, despite the lack of  surrounding bowel loops and no extraluminal oral contrast. Surgical  consultation is recommended.      2. Bibasilar atelectasis versus consolidation with small bilateral  pleural effusions.     3. Extensive mesenteric and soft tissue edema with large volume  ascites. Numerous mesenteric and retroperitoneal lymph nodes which are  presumably reactive.     4. Postsurgical changes of liver transplant.     [Urgent Result: Retroperitoneal gas]     Finding was identified on 7/25/2017 5:34 PM.      Dr. Santoyo was contacted by Dr. Atkins at 7/25/2017 5:37 PM and  verbalized understanding of the urgent finding.      I have personally reviewed the examination and initial interpretation  and I agree with the findings.     LUCI HOROWITZ MD  "

## 2017-07-30 NOTE — PLAN OF CARE
Problem: Goal Outcome Summary  Goal: Goal Outcome Summary  PT 7A: Pt very impulsive with mobility, with decreased awareness of lines and tubes requiring assist x2. Completing ambulation x12 feet to bathroom with Mod A for FWW management and max verbal and tactile cues for safety. Pt attempting sit at unsafe locations x3 attempting to sit on waste basket rather than toilet, and attempting sit with no chair during gait requiring max A. Pt completing sit to stand without physical assist, but due to decreased safety awareness requires hands on assist for all mobility.      Recommend Inpatient rehab

## 2017-07-30 NOTE — PROGRESS NOTES
Colorectal progress note  July 30, 2017    S: Pain well controlled, on oral thickened FLD and TF at goal, ileostomy with high output 2.4 L, continues to be febrile 102 yesterday AM    O:    Temp:  [98.4  F (36.9  C)-100.6  F (38.1  C)] 98.5  F (36.9  C)  Pulse:  [105] 105  Heart Rate:  [] 107  Resp:  [18-32] 24  BP: (132-168)/(68-90) 168/86  SpO2:  [93 %-98 %] 96 %      Intake/Output Summary (Last 24 hours) at 07/30/17 0901  Last data filed at 07/30/17 0800   Gross per 24 hour   Intake             2377 ml   Output             4785 ml   Net            -2408 ml       Gen: NAD, lying comfortably in bed  Chest: nonlabored breathing  CV: RRR  Abdomen: soft, non distended, appropriately tender, clean incisions, mucous fistula and ileostomy pink and healthy  Extremities: without cyanosis or edema    *Labs:  Lab Results   Component Value Date    WBC 5.2 07/30/2017     Lab Results   Component Value Date    RBC 2.90 07/30/2017     Lab Results   Component Value Date    HGB 8.2 07/30/2017     Lab Results   Component Value Date    HCT 25.8 07/30/2017     Lab Results   Component Value Date    MCV 89 07/30/2017     Lab Results   Component Value Date    MCH 28.3 07/30/2017     Lab Results   Component Value Date    MCHC 31.8 07/30/2017     Lab Results   Component Value Date    RDW 16.8 07/30/2017     Lab Results   Component Value Date     07/30/2017       Last Basic Metabolic Panel:  Lab Results   Component Value Date     07/30/2017      Lab Results   Component Value Date    POTASSIUM 3.8 07/30/2017     Lab Results   Component Value Date    CHLORIDE 118 07/30/2017     Lab Results   Component Value Date    JACOB 8.1 07/30/2017     Lab Results   Component Value Date    CO2 20 07/30/2017     Lab Results   Component Value Date    BUN 60 07/30/2017     Lab Results   Component Value Date    CR 1.10 07/30/2017     Lab Results   Component Value Date     07/30/2017       Sputum cx:  Pending    UC, blood, peritoneal  fluid cx negative     Pathology:  pending      A/P: 50 yo male, Reyes, s/p liver txp, negative exlap, CT concerning for perf colon, POD 3 exlap right angelique-colectomy, end ileostomy and mucous fistula. Had ROBF with high ileostomy output     - Diet and TF per primary team  - Recommend Imodium 2 mg BID  - Recommend keep drain till POD 5     Seen and discussed with Colorectal fellow    Karla Kraus, PGY-1  [phone]

## 2017-07-30 NOTE — PLAN OF CARE
Problem: Goal Outcome Summary  Goal: Goal Outcome Summary  Outcome: No Change  /76 (BP Location: Right arm)  Pulse 90  Temp 98.4  F (36.9  C) (Oral)  Resp 20  Ht 1.829 m (6')  Wt 98.2 kg (216 lb 7.9 oz)  SpO2 98%  BMI 29.36 kg/m2     Patient VSS on RA; afebrile. BG ranging between 108-175 on insulin gtt- alg 3. Endorses soreness in abdomen/ back but declines need for intervention. Denies nausea. TF infusing through NJ at 60 mL/hr. Nectar thickened liquids; patient able to drink 4 oz thickened water without incident. Triple lumen IJ infusing LR at 10 with insulin gtt and NS at TKO. TABITHA with moderate amount of serosanguineous output. Ostomy with over 2L brown output. Incontinent of urine x3. Bladder scanned for 380. Resident on call notified and straight cath'd for 190. Dressings in place over pressure ulcers. Incision stapled, primapore in place covering with minimal drainage. Turned and repositioned; oral cares completed. Continue with POC and notify MD with changes or concerns.

## 2017-07-30 NOTE — PROVIDER NOTIFICATION
Temp 102.1, RR 28, 's/80's, 's, sats 95% on RA.  MD notified.  Will continue to monitor and notify MD with changes.

## 2017-07-30 NOTE — PLAN OF CARE
Problem: Goal Outcome Summary  Goal: Goal Outcome Summary  Outcome: No Change  Alert and oriented to self and place but becoming more confused throughout the day.  Pt hallucinating; seeing his dog or people in the room.  Tmax 102.1 (see provider notification), RR 28, 's/80's, sats 95% on RA.  -194; insulin drip titrated per algorithm 4 and Lantus administered this evening.  Denies pain or nausea.  TF at goal 60mL/hr.  Nectar thickened full liquid diet ordered; poor appetite, carbs covered.  Voiding good amounts; intermittently incontinent.  PVR 91mL this morning.  Ileostomy with copious output; imodium ordered 4x/day.  TABITHA with moderate serosanguinous output.  Incision stapled and open to air.  Up ambulating in room with Ax2 and walker.  Continue to monitor temp and notify MD with changes.

## 2017-07-30 NOTE — PLAN OF CARE
Problem: Individualization  Goal: Patient Preferences  Pt transferred to floor with all possessions and spouse called about transfer.  Receiving RN given report and questions answered.  Pt stable at time of transfer.  Pt up to chair for 1.5hrs this afternoon with mod to max assist of one.  Dressing change performed on abd which was well approximated with minimal drainage.  Confusion and hallucinations more notable today 2/2 to being more awake, team is aware but because pt isn't pulling at lines and is pleasant no changes to be performed at this time.  Neuro: oriented to self. CV: Stable, hypertensive with pain.  Resp: Continues on 2L and promoting pulmonary toileting.  GI: TF at goal and FW at 60/hr which is starting to decrease NA level.  : Crowe pulled this afternoon and pt has voided twice since.  Skin: See epic.

## 2017-07-30 NOTE — PROGRESS NOTES
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  Now s/p ex lap with R hemicolectomy, end ileostomy, mucus fistula, partial omenectomy on 7/26.    IV insulin rates varying 1.5- 6 units/h since TFs went to goal rate of 60 units/h yesterday afternoon.  Maintaining higher rates now despite adding meal aspart.      Plan:  We will start to transition off IV insulin this evening, but given higher than expected insulin needs this afternoon we will try split dosing the glargine:  -Glargine 48 units ordered for this evening, with instructions to keep IV insulin going after glargine is given.  We will plan to give second dose of glargine and transition off IV insulin tomorrow morning.  -meal aspart 1unit/7g CHO ordered for meals and snacks (based on previous I:C of 1unit/10g CHO when off TFs during the daytime).       Outpatient diabetes follow up: Dr. Eckert at [facility]             Interval History:   The last 24 hours progress and nursing notes reviewed.  Our team signed off last week after pt was transferred to ICU.  He is now s/p exlap and R hemicolectomy, end ileostomy, mucus fistula, partial omenectomy on 7/26.  He was restarted on continuous TFs which reached goal rate yesterday afternoon ~1300: Nepro at 60ml/h continuously.  He is allowed a diet (full liq), and per RN has been having small snacks- like a carton of milk, but scattered throughout the day.  IV insulin rates having been quite variable: 1.5-6 units/h in the past 24h-- he required only 1.5 units/h for 3 hours overnight (-150s), but now this afternoon he is steadily requiring 6 units/h with now  getting meal aspart.  No documented interruption in TFs.   He is having high output from ileostomy.  Appears no fevers in the past 24h.      Recent Labs  Lab 07/30/17  1555 07/30/17  1502 07/30/17  1415 07/30/17  1305 07/30/17  1214 07/30/17  1051  07/30/17  0605  07/29/17  1715  07/29/17  0336  07/28/17  1648  07/28/17  0410  07/27/17  2110   GLC  --   --   --   --   --   --   --  155*  --  151*  --  113*  --  136*  --  161*  --  66*   * 139* 160* 156* 161* 194*  < >  --   < >  --   < >  --   < >  --   < >  --   < >  --    < > = values in this interval not displayed.            Review of Systems:   See interval hx          Medications:   PTA taking Januvia and glargine versus glargine and aspart per carb    Active Diet Order      Full Liquid Diet Nectar Thickened Liquids (pre-thickened or use instant food thickener)     Physical Exam:  Gen:  resting in recliner, in NAD.  Daughter is present.  HEENT: NC/AT, mucous membranes are moist, NJ feeding tube  Resp: unlabored at rest  Neuro: alert, oriented to self and place, some confusion  /78 (BP Location: Right arm)  Pulse 96  Temp 98.8  F (37.1  C) (Oral)  Resp 22  Ht 1.829 m (6')  Wt 94.4 kg (208 lb 1.6 oz)  SpO2 93%  BMI 28.22 kg/m2           Data:     Lab Results   Component Value Date    A1C  07/06/2017     Canceled, Test credited   Below Assay Range  NOTIFIED LEONARD ONEILL AT 0538 ON 7/6/17 BY CHRISSY      A1C 9.4 02/16/2017    A1C 6.2 12/14/2016    A1C 6.1 10/27/2016    A1C 8.3 07/02/2012            Recent Labs   Lab Test  07/30/17   0605  07/29/17   1715   NA  147*  146*   POTASSIUM  3.8  3.8   CHLORIDE  118*  116*   CO2  20  21   ANIONGAP  9  8   GLC  155*  151*   BUN  60*  65*   CR  1.10  1.28*   JACOB  8.1*  7.9*     CBC RESULTS:   Recent Labs   Lab Test  07/30/17   0605   WBC  5.2   RBC  2.90*   HGB  8.2*   HCT  25.8*   MCV  89   MCH  28.3   MCHC  31.8   RDW  16.8*   PLT  125*     Giovana Villasenor PA-C [phone]  Diabetes Management job code  [id number]

## 2017-07-31 ENCOUNTER — APPOINTMENT (OUTPATIENT)
Dept: PHYSICAL THERAPY | Facility: CLINIC | Age: 52
End: 2017-07-31
Attending: TRANSPLANT SURGERY
Payer: COMMERCIAL

## 2017-07-31 ENCOUNTER — APPOINTMENT (OUTPATIENT)
Dept: GENERAL RADIOLOGY | Facility: CLINIC | Age: 52
End: 2017-07-31
Attending: PHYSICIAN ASSISTANT
Payer: COMMERCIAL

## 2017-07-31 LAB
ANION GAP SERPL CALCULATED.3IONS-SCNC: 7 MMOL/L (ref 3–14)
ANION GAP SERPL CALCULATED.3IONS-SCNC: 8 MMOL/L (ref 3–14)
BACTERIA SPEC CULT: ABNORMAL
BACTERIA SPEC CULT: NO GROWTH
BASOPHILS # BLD AUTO: 0 10E9/L (ref 0–0.2)
BASOPHILS NFR BLD AUTO: 0.6 %
BUN SERPL-MCNC: 54 MG/DL (ref 7–30)
BUN SERPL-MCNC: 56 MG/DL (ref 7–30)
CALCIUM SERPL-MCNC: 8 MG/DL (ref 8.5–10.1)
CALCIUM SERPL-MCNC: 8 MG/DL (ref 8.5–10.1)
CHLORIDE SERPL-SCNC: 121 MMOL/L (ref 94–109)
CHLORIDE SERPL-SCNC: 121 MMOL/L (ref 94–109)
CO2 SERPL-SCNC: 19 MMOL/L (ref 20–32)
CO2 SERPL-SCNC: 19 MMOL/L (ref 20–32)
CREAT SERPL-MCNC: 0.95 MG/DL (ref 0.66–1.25)
CREAT SERPL-MCNC: 1.05 MG/DL (ref 0.66–1.25)
DIFFERENTIAL METHOD BLD: ABNORMAL
EOSINOPHIL # BLD AUTO: 0.1 10E9/L (ref 0–0.7)
EOSINOPHIL NFR BLD AUTO: 1.8 %
ERYTHROCYTE [DISTWIDTH] IN BLOOD BY AUTOMATED COUNT: 16.4 % (ref 10–15)
GFR SERPL CREATININE-BSD FRML MDRD: 74 ML/MIN/1.7M2
GFR SERPL CREATININE-BSD FRML MDRD: 83 ML/MIN/1.7M2
GLUCOSE BLDC GLUCOMTR-MCNC: 102 MG/DL (ref 70–99)
GLUCOSE BLDC GLUCOMTR-MCNC: 103 MG/DL (ref 70–99)
GLUCOSE BLDC GLUCOMTR-MCNC: 104 MG/DL (ref 70–99)
GLUCOSE BLDC GLUCOMTR-MCNC: 106 MG/DL (ref 70–99)
GLUCOSE BLDC GLUCOMTR-MCNC: 107 MG/DL (ref 70–99)
GLUCOSE BLDC GLUCOMTR-MCNC: 134 MG/DL (ref 70–99)
GLUCOSE BLDC GLUCOMTR-MCNC: 149 MG/DL (ref 70–99)
GLUCOSE BLDC GLUCOMTR-MCNC: 151 MG/DL (ref 70–99)
GLUCOSE BLDC GLUCOMTR-MCNC: 163 MG/DL (ref 70–99)
GLUCOSE BLDC GLUCOMTR-MCNC: 169 MG/DL (ref 70–99)
GLUCOSE BLDC GLUCOMTR-MCNC: 169 MG/DL (ref 70–99)
GLUCOSE BLDC GLUCOMTR-MCNC: 171 MG/DL (ref 70–99)
GLUCOSE BLDC GLUCOMTR-MCNC: 182 MG/DL (ref 70–99)
GLUCOSE BLDC GLUCOMTR-MCNC: 186 MG/DL (ref 70–99)
GLUCOSE BLDC GLUCOMTR-MCNC: 195 MG/DL (ref 70–99)
GLUCOSE BLDC GLUCOMTR-MCNC: 95 MG/DL (ref 70–99)
GLUCOSE BLDC GLUCOMTR-MCNC: 98 MG/DL (ref 70–99)
GLUCOSE SERPL-MCNC: 101 MG/DL (ref 70–99)
GLUCOSE SERPL-MCNC: 210 MG/DL (ref 70–99)
HCT VFR BLD AUTO: 24.6 % (ref 40–53)
HGB BLD-MCNC: 7.7 G/DL (ref 13.3–17.7)
IMM GRANULOCYTES # BLD: 0 10E9/L (ref 0–0.4)
IMM GRANULOCYTES NFR BLD: 0.6 %
LACTATE BLD-SCNC: 1 MMOL/L (ref 0.7–2.1)
LYMPHOCYTES # BLD AUTO: 1.7 10E9/L (ref 0.8–5.3)
LYMPHOCYTES NFR BLD AUTO: 34 %
MAGNESIUM SERPL-MCNC: 2 MG/DL (ref 1.6–2.3)
MAGNESIUM SERPL-MCNC: 2.1 MG/DL (ref 1.6–2.3)
MCH RBC QN AUTO: 27.8 PG (ref 26.5–33)
MCHC RBC AUTO-ENTMCNC: 31.3 G/DL (ref 31.5–36.5)
MCV RBC AUTO: 89 FL (ref 78–100)
MICRO REPORT STATUS: ABNORMAL
MICRO REPORT STATUS: NORMAL
MONOCYTES # BLD AUTO: 0.3 10E9/L (ref 0–1.3)
MONOCYTES NFR BLD AUTO: 5.9 %
NEUTROPHILS # BLD AUTO: 2.8 10E9/L (ref 1.6–8.3)
NEUTROPHILS NFR BLD AUTO: 57.1 %
NRBC # BLD AUTO: 0 10*3/UL
NRBC BLD AUTO-RTO: 0 /100
PHOSPHATE SERPL-MCNC: 2.7 MG/DL (ref 2.5–4.5)
PHOSPHATE SERPL-MCNC: 2.9 MG/DL (ref 2.5–4.5)
PLATELET # BLD AUTO: 105 10E9/L (ref 150–450)
POTASSIUM SERPL-SCNC: 4.1 MMOL/L (ref 3.4–5.3)
POTASSIUM SERPL-SCNC: 4.2 MMOL/L (ref 3.4–5.3)
RBC # BLD AUTO: 2.77 10E12/L (ref 4.4–5.9)
SODIUM SERPL-SCNC: 147 MMOL/L (ref 133–144)
SODIUM SERPL-SCNC: 147 MMOL/L (ref 133–144)
SPECIMEN SOURCE: ABNORMAL
SPECIMEN SOURCE: NORMAL
TACROLIMUS BLD-MCNC: ABNORMAL UG/L (ref 5–15)
TME LAST DOSE: ABNORMAL H
WBC # BLD AUTO: 4.9 10E9/L (ref 4–11)

## 2017-07-31 PROCEDURE — 25000128 H RX IP 250 OP 636: Performed by: PHYSICIAN ASSISTANT

## 2017-07-31 PROCEDURE — 25000132 ZZH RX MED GY IP 250 OP 250 PS 637: Performed by: STUDENT IN AN ORGANIZED HEALTH CARE EDUCATION/TRAINING PROGRAM

## 2017-07-31 PROCEDURE — 27210913 ZZH NUTRITION PRODUCT INTERMEDIATE PACKET

## 2017-07-31 PROCEDURE — 83735 ASSAY OF MAGNESIUM: CPT | Performed by: TRANSPLANT SURGERY

## 2017-07-31 PROCEDURE — 99213 OFFICE O/P EST LOW 20 MIN: CPT

## 2017-07-31 PROCEDURE — 36415 COLL VENOUS BLD VENIPUNCTURE: CPT | Performed by: PHYSICIAN ASSISTANT

## 2017-07-31 PROCEDURE — 85025 COMPLETE CBC W/AUTO DIFF WBC: CPT | Performed by: TRANSPLANT SURGERY

## 2017-07-31 PROCEDURE — 80197 ASSAY OF TACROLIMUS: CPT | Performed by: TRANSPLANT SURGERY

## 2017-07-31 PROCEDURE — 25000125 ZZHC RX 250: Performed by: TRANSPLANT SURGERY

## 2017-07-31 PROCEDURE — 25000132 ZZH RX MED GY IP 250 OP 250 PS 637: Performed by: TRANSPLANT SURGERY

## 2017-07-31 PROCEDURE — 25000131 ZZH RX MED GY IP 250 OP 636 PS 637: Performed by: PHYSICIAN ASSISTANT

## 2017-07-31 PROCEDURE — 25000132 ZZH RX MED GY IP 250 OP 250 PS 637: Performed by: PHYSICIAN ASSISTANT

## 2017-07-31 PROCEDURE — 84100 ASSAY OF PHOSPHORUS: CPT | Performed by: TRANSPLANT SURGERY

## 2017-07-31 PROCEDURE — 25000131 ZZH RX MED GY IP 250 OP 636 PS 637: Performed by: TRANSPLANT SURGERY

## 2017-07-31 PROCEDURE — 25000131 ZZH RX MED GY IP 250 OP 636 PS 637: Performed by: CLINICAL NURSE SPECIALIST

## 2017-07-31 PROCEDURE — 27210432 ZZH NUTRITION PRODUCT RENAL BASIC LITER

## 2017-07-31 PROCEDURE — 25000128 H RX IP 250 OP 636: Performed by: TRANSPLANT SURGERY

## 2017-07-31 PROCEDURE — 00000146 ZZHCL STATISTIC GLUCOSE BY METER IP

## 2017-07-31 PROCEDURE — 40000193 ZZH STATISTIC PT WARD VISIT

## 2017-07-31 PROCEDURE — 97530 THERAPEUTIC ACTIVITIES: CPT | Mod: GP

## 2017-07-31 PROCEDURE — 25000128 H RX IP 250 OP 636: Performed by: STUDENT IN AN ORGANIZED HEALTH CARE EDUCATION/TRAINING PROGRAM

## 2017-07-31 PROCEDURE — 71020 XR CHEST 2 VW: CPT

## 2017-07-31 PROCEDURE — 12000025 ZZH R&B TRANSPLANT INTERMEDIATE

## 2017-07-31 PROCEDURE — 25000132 ZZH RX MED GY IP 250 OP 250 PS 637: Performed by: SURGERY

## 2017-07-31 PROCEDURE — 80048 BASIC METABOLIC PNL TOTAL CA: CPT | Performed by: TRANSPLANT SURGERY

## 2017-07-31 PROCEDURE — 36592 COLLECT BLOOD FROM PICC: CPT | Performed by: TRANSPLANT SURGERY

## 2017-07-31 PROCEDURE — 36415 COLL VENOUS BLD VENIPUNCTURE: CPT | Performed by: TRANSPLANT SURGERY

## 2017-07-31 PROCEDURE — 87496 CYTOMEG DNA AMP PROBE: CPT

## 2017-07-31 PROCEDURE — 25000128 H RX IP 250 OP 636: Performed by: INTERNAL MEDICINE

## 2017-07-31 PROCEDURE — 87040 BLOOD CULTURE FOR BACTERIA: CPT | Performed by: PHYSICIAN ASSISTANT

## 2017-07-31 PROCEDURE — 83605 ASSAY OF LACTIC ACID: CPT | Performed by: TRANSPLANT SURGERY

## 2017-07-31 PROCEDURE — 25000125 ZZHC RX 250: Performed by: INTERNAL MEDICINE

## 2017-07-31 RX ORDER — GUAR GUM
1 PACKET (EA) ORAL 3 TIMES DAILY
Status: DISCONTINUED | OUTPATIENT
Start: 2017-07-31 | End: 2017-08-08

## 2017-07-31 RX ORDER — ACETAMINOPHEN 325 MG/1
650 TABLET ORAL EVERY 4 HOURS PRN
Status: DISCONTINUED | OUTPATIENT
Start: 2017-07-31 | End: 2017-09-08 | Stop reason: HOSPADM

## 2017-07-31 RX ORDER — ACETAMINOPHEN 650 MG/1
650 SUPPOSITORY RECTAL EVERY 4 HOURS PRN
Status: DISCONTINUED | OUTPATIENT
Start: 2017-07-31 | End: 2017-08-09

## 2017-07-31 RX ADMIN — Medication: at 14:25

## 2017-07-31 RX ADMIN — MULTIVIT AND MINERALS-FERROUS GLUCONATE 9 MG IRON/15 ML ORAL LIQUID 15 ML: at 08:06

## 2017-07-31 RX ADMIN — OXYCODONE HYDROCHLORIDE 5 MG: 5 SOLUTION ORAL at 21:53

## 2017-07-31 RX ADMIN — LOPERAMIDE HYDROCHLORIDE 2 MG: 1 SOLUTION ORAL at 21:53

## 2017-07-31 RX ADMIN — DAPTOMYCIN 750 MG: 500 INJECTION, POWDER, LYOPHILIZED, FOR SOLUTION INTRAVENOUS at 17:11

## 2017-07-31 RX ADMIN — GANCICLOVIR SODIUM 450 MG: 500 INJECTION, POWDER, LYOPHILIZED, FOR SOLUTION INTRAVENOUS at 08:04

## 2017-07-31 RX ADMIN — MICAFUNGIN SODIUM 100 MG: 10 INJECTION, POWDER, LYOPHILIZED, FOR SOLUTION INTRAVENOUS at 13:07

## 2017-07-31 RX ADMIN — ACETAMINOPHEN 650 MG: 325 TABLET, FILM COATED ORAL at 01:20

## 2017-07-31 RX ADMIN — Medication 1 PACKET: at 12:40

## 2017-07-31 RX ADMIN — INSULIN HUMAN 15 UNITS: 100 INJECTION, SUSPENSION SUBCUTANEOUS at 12:35

## 2017-07-31 RX ADMIN — Medication 1 PACKET: at 19:14

## 2017-07-31 RX ADMIN — PANTOPRAZOLE SODIUM 40 MG: 40 TABLET, DELAYED RELEASE ORAL at 08:06

## 2017-07-31 RX ADMIN — TACROLIMUS 4 MG: 5 CAPSULE ORAL at 19:19

## 2017-07-31 RX ADMIN — HEPARIN SODIUM 5000 UNITS: 5000 INJECTION, SOLUTION INTRAVENOUS; SUBCUTANEOUS at 00:03

## 2017-07-31 RX ADMIN — Medication 1 PACKET: at 19:16

## 2017-07-31 RX ADMIN — LOPERAMIDE HYDROCHLORIDE 2 MG: 1 SOLUTION ORAL at 17:11

## 2017-07-31 RX ADMIN — HEPARIN SODIUM 5000 UNITS: 5000 INJECTION, SOLUTION INTRAVENOUS; SUBCUTANEOUS at 08:06

## 2017-07-31 RX ADMIN — HEPARIN SODIUM 5000 UNITS: 5000 INJECTION, SOLUTION INTRAVENOUS; SUBCUTANEOUS at 17:11

## 2017-07-31 RX ADMIN — LOPERAMIDE HYDROCHLORIDE 2 MG: 1 SOLUTION ORAL at 08:05

## 2017-07-31 RX ADMIN — TACROLIMUS 1 MG: 5 CAPSULE ORAL at 12:37

## 2017-07-31 RX ADMIN — Medication 80 MG: at 08:05

## 2017-07-31 RX ADMIN — MEROPENEM 1 G: 1 INJECTION, POWDER, FOR SOLUTION INTRAVENOUS at 06:03

## 2017-07-31 RX ADMIN — ACETAMINOPHEN 650 MG: 325 SOLUTION ORAL at 21:53

## 2017-07-31 RX ADMIN — LOPERAMIDE HYDROCHLORIDE 2 MG: 1 SOLUTION ORAL at 12:37

## 2017-07-31 RX ADMIN — TACROLIMUS 3 MG: 5 CAPSULE ORAL at 08:04

## 2017-07-31 RX ADMIN — GANCICLOVIR SODIUM 950 MG: 500 INJECTION, POWDER, LYOPHILIZED, FOR SOLUTION INTRAVENOUS at 19:15

## 2017-07-31 RX ADMIN — MEROPENEM 1 G: 1 INJECTION, POWDER, FOR SOLUTION INTRAVENOUS at 21:54

## 2017-07-31 RX ADMIN — MEROPENEM 1 G: 1 INJECTION, POWDER, FOR SOLUTION INTRAVENOUS at 13:06

## 2017-07-31 NOTE — PROGRESS NOTES
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 Concern for CMV colitis with low count CMV viremia/GI PTLD and subsequently underwent colonoscopy on 7/21, random biopsies obtained.  On 7/25/2017 patient had respiratory distress, abdominal compartment syndrome and EJ with oliguria. Broad spectrum antibiotics were started.  He was intubated and had a paracentesis with 5L removed. He did not required pressors.  CT was obtained which showed a new large heterogeneous right sided retroperitoneal gas and air tracking along duodenum.  No contrast extravasation.  No intraperitoneal air noted. Colorectal surgery was consulted. Initial conservative management with bowel rest and IV abx. Pt continued to spike fevers despite IV abx.  Now s/p Exploratory laparotomy, right angelique-colectomy, end ileostomy, mucus fistula, partial omentectomy on 7/26.    Graft Function: Good function. LFTs WNL. US liver unremarkable.  Immunosuppression Management:   CellCept discontinued (6/27/17) due to neutropenia.   Prograf goal ~8 due to single IS. 7/27 > 24 hr level < 3. Subtherapeutic despite frequent dose increases, now with increased level. HELD for now due to EJ, restarted 3 mg BID yesterday and check level on Monday.   Cardiorespiratory: Acute respiratory distress with hypercapnia.  Acidotic breathing secondary to sepsis. Improved after control of sepsis. Extubated on Friday. O2 supplement PRN. OOB to chair. Increase incentive spirometry use. Repeat CXR today.   Hematology: Pancytopenia on admission, acute on chronic, secondary to medications (MMF,  bactrim, valcyte). Improved with holding medications and neupogen. 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  Continue to hold MMF and bactrim.   -Anemia- HGB 7.7. Last blood transfusion on 7/26.   -Cytology ascites fluid, negative for malignancy   GI: Neutropenic colitis. Colonoscopy 7/21. Biopsy results showing resolving injury secondary to possible drug induced vs infectious.   -CT scan abd/pelvis, po contrast, showed a new large heterogeneous right sided retroperitoneal gas and air tracking along duodenum.  No contrast extravasation.  No intraperitoneal air noted. Colorectal surgery consulted.  Continued to spike high grade temperature despite IV antibiotics therefore patient taken to OR. s/p Exploratory laparotomy, right angelique-colectomy, end ileostomy, mucus fistula, partial omentectomy on 7/26. Path in process ([id number], pathologist Dr. Lafleur, results should be final possible today). Findings no neida perforation, phlegmon/inflammationright colon. WOCN consult for ostomy care.   -Honey thickened liquid diet. TF at goal .   Large output from the iliostomy - Output 3.5L yesterday. Goal ileostomy output ~ 1200 cc in 24 hrs.   loperamide 2mg q6H, increased yesterday. Start fiber TID today. May need to add opium tincture.    Fluid/Electrolytes/Renal: EJ oliguric likely sec to high intraabdominal pressures and ischemic ATN sec to sepsis. Nephrology consulted. No indication for RRT presently. UO good, Creatinine 1.1.  Hypernatremia, free water deficit. Continue FW 60 cc per hour. Check BMP this evening. K, Mg, Phos stable.   Endocrine: DM type 2. On insulin gtt. Endocrine team will transition to SQ insulin today.  Infectious disease:   -Severe sepsis, right colon phlegmon. Meropenem/daptomycin/micafungin tx. S/p right angelique-colectomy. Path pending. Fluid cx x 2 NGTD.  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  CMV discordent with CMV PCR increasing. Continue IV ganciclovir.  Follow CMV PCR weekly  -7/25 Ucx, Bcx and ascites cx negative to date  -ID consulting.   Neuro: Toxic metabolic encephalopathy secondary to infection, prolonged hospital stay, significant improvement.  Vascular: Right Arterial line clot 2/2 previous arterial line. Vascular consulted. Recommend ASA only. Some evidence of microvascular injury in digits (toes) this is most likely due to injury while patient was on pressor therapy and sepsis. Now with a third toe injury, vascular consulted again. US completed.  ECHO EF 60-65%. 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   Activity: PT/OT  Prophylaxis: DVT-Heparin SQ  Disposition: 7A. Will transfer to TCU for rehab once status stable. Possible transfer in a few days.       Medical Decision Making: Medium  Admit [id number] (moderate level decision making)    PILLO/Fellow/Resident Provider: Ada Driver PA-C    Faculty: Chaparro Anderson M.D.    __________________________________________________________________  Transplant History:.  3/4/2017 DD OLT with Dr. Tran (Liver), Postoperative day: 149     Interval History:   Overnight events: Tmax 102 , no complaints, extubated yesterday,   ROS:   A 10-point review of systems was negative except as noted above.    Meds:    tacrolimus  4 mg Oral BID IS     insulin isophane human  15 Units Subcutaneous QAM     insulin aspart  1-12 Units Subcutaneous Q4H     - MEDICATION INSTRUCTIONS -   Does not apply Once     loperamide  2 mg Oral or Feeding Tube 4x Daily     insulin aspart   Subcutaneous TID w/meals     insulin glargine  48 Units Subcutaneous Q24H     meropenem  1 g Intravenous Q8H     ganciclovir (CYTOVENE) intermittent infusion  5 mg/kg (Dosing Weight) Intravenous Q12H     protein modular  1 packet Per Feeding Tube TID     DAPTOmycin (CUBICIN) intermittent infusion  8 mg/kg (Dosing Weight) Intravenous Q24H     micafungin  100 mg Intravenous Q24H     aspirin  80 mg Oral Daily     multivitamins with  minerals  15 mL Per Feeding Tube Daily     heparin  5,000 Units Subcutaneous Q8H     pantoprazole  40 mg Oral or Feeding Tube Daily     sodium chloride (PF)  3 mL Intracatheter Q8H       Physical Exam:     Admit Weight: 94.2 kg (207 lb 11.2 oz) (SCALE 2)    Current vitals:   /87  Pulse 92  Temp 100.6  F (38.1  C)  Resp 28  Ht 1.829 m (6')  Wt 94.4 kg (208 lb 1.6 oz)  SpO2 98%  BMI 28.22 kg/m2    Vital sign ranges:    Temp:  [98.4  F (36.9  C)-102.1  F (38.9  C)] 100.6  F (38.1  C)  Pulse:  [87-94] 92  Heart Rate:  [] 104  Resp:  [18-30] 28  BP: (152-176)/(81-90) 159/87  SpO2:  [93 %-98 %] 98 %  Patient Vitals for the past 24 hrs:   BP Temp Temp src Pulse Heart Rate Resp SpO2 Weight   07/31/17 1129 159/87 100.6  F (38.1  C) - - 104 28 98 % -   07/31/17 0940 159/88 - - - 105 - 97 % -   07/31/17 0706 167/81 98.4  F (36.9  C) - 92 - 20 95 % -   07/31/17 0349 165/89 99  F (37.2  C) Oral 94 - 22 98 % -   07/31/17 0100 - 99.7  F (37.6  C) Oral - - - - -   07/31/17 0010 152/86 102  F (38.9  C) Axillary - 102 26 97 % -   07/30/17 2011 176/90 100.8  F (38.2  C) Oral - 104 30 96 % -   07/30/17 1816 166/89 102.1  F (38.9  C) Oral - 108 28 95 % -   07/30/17 1611 160/86 100.8  F (38.2  C) Oral 87 87 18 93 % -   07/30/17 1334 - - - - - - 93 % -   07/30/17 1332 - - - - - - - 94.4 kg (208 lb 1.6 oz)     General Appearance: NAD  Skin: normal, warm, dry  Heart: RRR  Lungs: BCTA, on 1L NC  Abdomen: soft, appropriately ttp. Ileostomy with brown output. Mucus fistula, pink. Midline incision, c/d/i. Chevron incision well healed.   : No vazquez.   Extremities: No edema. Ext NT x 4.  Neurologic: A&O   CVC    Data:   CMP    Recent Labs  Lab 07/31/17  0546 07/30/17  1739  07/27/17  0410  07/26/17  2133 07/26/17  2046  07/25/17  1651 07/25/17  1335   * 148*  < > 142  < > 139 139  < > 138  --    POTASSIUM 4.1 4.1  < > 3.5  < > 3.5 3.5  < > 4.4  --    CHLORIDE 121* 121*  < > 108  < >  --   --   < > 106  --    CO2 19* 19*   < > 20  < >  --   --   < > 24  --    * 160*  < > 164*  < > 155* 151*  < > 176*  --    BUN 56* 55*  < > 72*  < >  --   --   < > 79*  --    CR 1.05 1.06  < > 2.82*  < >  --   --   < > 2.90*  --    GFRESTIMATED 74 73  < > 24*  < >  --   --   < > 23*  --    GFRESTBLACK 89 88  < > 29*  < >  --   --   < > 28*  --    JACOB 8.0* 7.9*  < > 7.9*  < >  --   --   < > 8.0*  --    ICAW  --   --   --   --   --  4.4 4.3*  < >  --   --    MAG 2.1 2.1  < > 2.0  --   --   --   < > 1.9  --    PHOS 2.9 2.3*  < > 5.8*  --   --   --   < > 4.8*  --    ALBUMIN  --   --   --  2.4*  --   --   --   --  2.0*  --    BILITOTAL  --   --   --  1.3  --   --   --   --  0.6  --    ALKPHOS  --   --   --  143  --   --   --   --  242*  --    AST  --   --   --  27  --   --   --   --  61*  --    ALT  --   --   --  27  --   --   --   --  50  --    FAMY  --   --   --   --   --   --   --   --   --  8   < > = values in this interval not displayed.  CBC    Recent Labs  Lab 07/31/17  0546 07/30/17  0605   HGB 7.7* 8.2*   WBC 4.9 5.2   * 125*     COAGS    Recent Labs  Lab 07/26/17  2243 07/26/17  2017 07/26/17  1620   INR 1.31* 1.31* 1.30*   PTT  --  39* 44*      Urinalysis  Recent Labs   Lab Test  07/28/17   1042  07/25/17   1205   06/14/17   1508   04/11/16   1345   COLOR  Yellow  Dark Yellow   < >   --    < >  Yellow   APPEARANCE  Slightly Cloudy  Cloudy   < >   --    < >  Clear   URINEGLC  Negative  70*   < >   --    < >  30*   URINEBILI  Negative  Negative   < >   --    < >  Negative   URINEKETONE  Negative  Negative   < >   --    < >  Negative   SG  1.012  1.014   < >   --    < >  1.016   UBLD  Trace*  Moderate*   < >   --    < >  Small*   URINEPH  5.0  5.0   < >   --    < >  5.0   PROTEIN  30*  100*   < >   --    < >  30*   NITRITE  Negative  Negative   < >   --    < >  Negative   LEUKEST  Negative  Trace*   < >   --    < >  Negative   RBCU  5*  >182*   < >   --    < >  1   WBCU  6*  5*   < >   --    < >  1   UTPG   --    --    --   1.55*   --  "  0.41*    < > = values in this interval not displayed.          7/21/2017   SPECIMEN(S):   A: Colon biopsy, ascending   B: Colon biopsy, descending     FINAL DIAGNOSIS:   A. COLON BIOPSY, ASCENDING:   - Colonic mucosa with no significant histologic abnormality   - Negative for intraepithelial lymphocytosis or deposition of   subepithelial thick collagenous band     B. COLON BIOPSY, DESCENDING:   - Crypt architecture distortion, consistent with healed prior injury   - Negative for active inflammation or dysplasia   - Negative for intraepithelial lymphocytosis or deposition of   subepithelial thick collagenous band   - Please, see comment     COMMENT:   Specimen B, \"descending colon\" features lamina propria edema, slight   variation in crypt sizes and shapes and occasional crypt drop-out.  This   may indicate a resolving injury which could be drug-induced or   infectious.     CT scan 7/25/2017:   IMPRESSION:   1. New large heterogeneous area of right-sided retroperitoneal gas  which is highly concerning for an infectious process. Given the  history of prior neutropenic colitis and biopsies, there could also be  a component of stool from a ruptured viscus, despite the lack of  surrounding bowel loops and no extraluminal oral contrast. Surgical  consultation is recommended.      2. Bibasilar atelectasis versus consolidation with small bilateral  pleural effusions.     3. Extensive mesenteric and soft tissue edema with large volume  ascites. Numerous mesenteric and retroperitoneal lymph nodes which are  presumably reactive.     4. Postsurgical changes of liver transplant.     [Urgent Result: Retroperitoneal gas]     Finding was identified on 7/25/2017 5:34 PM.      Dr. Santoyo was contacted by Dr. Atkins at 7/25/2017 5:37 PM and  verbalized understanding of the urgent finding.      I have personally reviewed the examination and initial interpretation  and I agree with the findings.     LUCI HOROWITZ, " MD    Attestation:    The patient has been seen and evaluated by me.   Vital signs, labs, medications and orders were reviewed.   When obtained, diagnostic images were reviewed by me and interpreted as above.    The care plan was discussed with the multidisciplinary team and I agree with the findings and plan in this note, with any differences recorded in blue.    Immunosuppressive medication management was reviewed and adjusted as reflected in the note and orders.     .

## 2017-07-31 NOTE — PROGRESS NOTES
"Colorectal Surgery Progress Note      Subjective:  Awake and alert.      Vitals:  Vitals:    07/31/17 0010 07/31/17 0100 07/31/17 0349 07/31/17 0706   BP: 152/86  165/89 167/81   BP Location: Right arm  Right arm    Pulse:   94 92   Resp: 26  22 20   Temp: 102  F (38.9  C) 99.7  F (37.6  C) 99  F (37.2  C) 98.4  F (36.9  C)   TempSrc: Axillary Oral Oral    SpO2: 97%  98% 95%   Weight:       Height:         I/O:  I/O last 3 completed shifts:  In: 3649.83 [P.O.:480; I.V.:689.83; NG/GT:1040]  Out: 3840 [Urine:750; Drains:665; Stool:2425]    Physical Exam:  Gen: Awake, alert, calm.  Nodding yes/no to questions.   Pulm: intubated  Abd: Soft, mildly distended but much less compared to prior   Incision dressed   Ileostomy - pink, viable, red rubber via stoma, +succus.    Mucus fistula and incision dressed   TABITHA drain serosang output  Ext:      BMP    Recent Labs  Lab 07/31/17  0546 07/30/17  1739 07/30/17  0605 07/29/17  1715   * 148* 147* 146*   POTASSIUM 4.1 4.1 3.8 3.8   CHLORIDE 121* 121* 118* 116*   CO2 19* 19* 20 21   BUN 56* 55* 60* 65*   CR 1.05 1.06 1.10 1.28*   * 160* 155* 151*   MAG 2.1 2.1 2.2 2.3   PHOS 2.9 2.3* 2.6 3.0     CBC    Recent Labs  Lab 07/31/17  0546 07/30/17  0605 07/29/17  0336 07/28/17  0410   WBC 4.9 5.2 6.3 9.3   HGB 7.7* 8.2* 8.1* 8.0*   HCT 24.6* 25.8* 24.8* 24.4*   * 125* 121* 128*         ASSESSMENT: This is a 51 year old male with complex PMH of CASTAÑEDA cirrhosis s/p Liver transplant Mar 2017.  Was admitted on 7/4/2014 with abdominal pain, sepsis, CT at OSH showed \"right colonic wall thickening suggesting colitis\" underwent Ex-Lap and washout for neutropenic colitis, intra-op was noted to have inflammed cecum and ascending colon with murky fluid.  Abdomen was left open at the time and was closed on 7/5/2017.  Some concern for CMV colitis with low count CMV viremia/GI PTLD and subsequently underwent colonoscopy on 7/21:  No colitis was seen on visualized portions of mucosa. "  Exam sub-optimal as colon filled with solid stool.  Random biopsies obtained.  On 7/25/2017 pt became more tachycardic, increasing O2 needs and altered, pt was intubated, hypotension responsive to IVFs, worsening kidney function.  Has not required pressors.  CT was obtained which showed a new large heterogeneous right sided retroperitoneal gas and air tracking along duodenum.  No contrast extravasation.  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  Despite conservative management with bowel rest and IV abx, pt continued to spike fevers.  Now s/p Exploratory laparotomy, right angelique-colectomy, end ileostomy, mucus fistula, partial omentectomy on 7/26.  No perforation noted, inflammation of right colon.     - defer management to transplant  - can remove TABITHA drain from colorectal perspective  - intra-op peritoneal fluid cultures - NGTD.  Path pending.   - WOCN for new ileostomy & mucus fistula  - now with ileostomy, do not recommend bowel regimens (miralax, senna-docusate, dulcolax, etc).    - For high ileostomy outputs, already receiving fiber.  Can slowly titrate imodium to max of 4mg QID.  If continue to have high outputs despite this, can start lomotil 1 tab BID-TID and titrate to max of 2 tabs QID.  After can consider cholestyramine and or opium tincture.  Typically recommend ileostomy output goal of about 1200mL in 24 hours +/- 200mL depending on oral intake.  Defer fluid status to transplant and nephrology.   - OK for DVT ppx from colorectal perspective    Iqra Acosta PA-C   Colon and Rectal Surgery  [id number]     Patient was seen and discussed with Fellow, Dr. Solano    Attestation:  This patient has been seen and evaluated by me, Sara Dinh.  Discussed with the house staff team or resident(s) and agree with the findings and plan in this note.  Sara Dinh MD  Colon and Rectal Surgery Attending  [phone]

## 2017-07-31 NOTE — PROGRESS NOTES
"[facility]  WO Nurse Inpatient Adult Pressure INJURY (PI) Wound Assessment     Follow up assessment of PU(s) on pt's:  Ileostomy   Follow up assessment of PI on- left ear lobe and Sacrum      Data:   Patient History:      per MD note(s): This is a 51 year old male with complex PMH of CASTAÑEDA cirrhosis s/p Liver transplant Mar 2017.  Was admitted on 2014 with abdominal pain, sepsis, CT at OSH showed \"right colonic wall thickening suggesting colitis\" underwent Ex-Lap and washout for neutropenic colitis, intra-op was noted to have inflammed cecum and ascending colon with murky fluid.  Abdomen was left open at the time and was closed on 2017.  Some concern for CMV colitis with low count CMV viremia/GI PTLD and subsequently underwent colonoscopy on :  No colitis was seen on visualized portions of mucosa.     Current mattress:  AtmosAir  Current pressure relieving devices:  Z-flow, Heel lift boots, Foam dressing and Pillows    Moisture Management:  Incontinence Protocol      Current Diet / Nutrition:         Active Diet Order      Full Liquid Diet Nectar Thickened Liquids (pre-thickened or use instant food thickener)      Kwasi Score  Av.5  Min: 10  Max: 22       Labs:     Recent Labs   Lab Test  17   0546   17   0410  17   2243   17   0316   17   1810   ALBUMIN   --    --   2.4*   --    < >   --    < >   --    HGB  7.7*   < >  9.3*  9.1*   < >  7.1*   < >  7.3*   INR   --    --    --   1.31*   < >  1.80*   < >   --    WBC  4.9   < >  10.0  10.3   < >  0.8*   < >  0.8*   A1C   --    --    --    --    --   Canceled, Test credited   Below Assay Range  NOTIFIED LEONARD ONEILL AT 0538 ON 17 BY CHRISSY     --    --    CRP   --    --    --    --    --    --    --   354.0    < > = values in this interval not displayed.                                                                                                                        Pressure Injury Assessment  " (location #1):   Sacrum  Wound History:   Pt was transferred from  last week. Had multiple procedures throughout the day on 7/25/17 per RN. Unable to take picture today due to pt being in pain with turning.        7/26/17    Wound Base: moist dark maroon tissue    Specific Dimensions (length x width x depth, in cm) :   3.5x2.2x0.1cm, area appears to be more diffuse today    Tunneling:  N/A    Undermining: N/A    Palpation of the wound bed:  Firm on bone    Slough appearance:  none    Eschar appearance:  none    Periwound Skin: minimal superficial moist skin      Color: pink    Temperature  normal     Drainage:  Minimal serous         Odor: none    Pain:  painful  Pressure Injury Assessment  (location #2):   Left ear lobe  Wound History:   Noted this injury during assessing the pt for sacral pressure injury last week. Now able to turn his head independently        7/26/17    Wound Base: moist dark maroon/purple tissue     Specific Dimensions (length x width x depth, in cm) :   2.3x0.5x0.0cm    Tunneling:  N/A    Undermining: N/A    Palpation of the wound bed:  none    Slough appearance:  none    Eschar appearance:  none    Periwound Skin: intact     Color: pink    Temperature  normal     Drainage:  none         Odor: none    Pain:  unable to assess , intubated             Intervention:     Patient's chart evaluated.      Kwasi Interventions:  Current Kwasi Interventions and Care Plan reviewed and updated, appropriate at this time.    Wound was assessed.    Wound Care: was done: per POC mentioned below,    Orders  Written    Supplies  Reviewed    Discussed plan of care with Nurse           Assessment:     Pressure Injury (PI) located on Sacrum and left ear lobe: DTPI    Status: wound  initial assessment, Symptomatic, Evolving    New ileostomy and Mucus fistula         Plan:     Nursing to notify the Provider(s) and re-consult the [facility] Nurse if wound(s) deteriorate(s).  Plan of care for wound located on Sacrum :  "Cleanse the area with NS and pat dry.  Apply No sting barrier to periwound skin.  Cover wound with Sacral Mepilex (#[id number])  Change dressing Q 3 days or PRN.  Turn and reposition Q 2hrs.  Ensure pt has Adolph-cushion while sitting up in the chair.      Plan of care for wound located on left ear lobe: Monitor the area closely. Cover with a piece of Mepilex. Place Z-flow pillow under head and reposition frequently.    WOC Nurse will return: twice a week  Face to face time: 30 mins  [facility]  WO Nurse Inpatient Ostomy Assessment      Follow up assessment of ostomy and needs for:  Ileostomy and Mucus fistula      Data:   History:    This is a 51 year old male with complex PMH of CASTAÑEDA cirrhosis s/p Liver transplant Mar 2017.  Was admitted on 7/4/2014 with abdominal pain, sepsis, CT at OSH showed \"right colonic wall thickening suggesting colitis\" underwent Ex-Lap and washout for neutropenic colitis, intra-op was noted to have inflammed cecum and ascending colon with murky fluid.  Abdomen was left open at the time and was closed on 7/5/2017.  Some concern for CMV colitis with low count CMV viremia/GI PTLD and subsequently underwent colonoscopy on 7/21:  No colitis was seen on visualized portions of mucosa.  Exam sub-optimal as colon filled with solid stool.  Random biopsies obtained.  On 7/25/2017 pt became more tachycardic, increasing O2 needs and altered, pt was intubated, hypotension responsive to IVFs, worsening kidney function.  Has not required pressors.  CT was obtained which showed a new large heterogeneous right sided retroperitoneal gas and air tracking along duodenum.  No contrast extravasation.  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  Despite conservative management with bowel rest and IV abx, pt continued to spike fevers.  Now s/p Exploratory laparotomy, right angelique-colectomy, end ileostomy, mucus fistula, partial omentectomy on 7/26.    Type of ostomy:  Ileostomy and Mucus " "fistula  Stoma assessment:   ? stoma:  32mm\" , viable, pink, pale, round, moist, edematous and protruberant  ? A bridge in place?: No, 1 RRC in place  ? Stent(s) in place?: Not applicable,   ? Catheter secured? Yes:   Mucocutaneous Junction (MCJ):  intact  Peristomal skin:  intact  Abdominal assessment: soft and distended  ? N/G still in place?:  Yes   Output:  small, watery, green,   I/O last 3 completed shifts:  In: 3649.83 [P.O.:480; I.V.:689.83; NG/GT:1040]  Out: 3840 [Urine:750; Drains:665; Stool:2425]  Current pouching system:  [place],  two piece, cut to fit and flat and barrier ring with minimal melting around the cutting edges.   Pain:  Complaining of pain during cleansing  ? Is pt still on a PCA? No    Diet:           Active Diet Order      Full Liquid Diet Nectar Thickened Liquids (pre-thickened or use instant food thickener)            Intervention:     Patient's chart evaluated.      Assessments done today:  Stoma, peristomal skin, and learning needs  ? Participants: pt only. He is not appropriate for teaching yet, confused.  ?  Educational intervention: method: Replaced pouch using below mentioned supplies and ordered supplies for next pouch change  Prepared for discharge by: No discharge preparations started,          Assessment:     Stoma assessment: viable and healthy    Complications: None    Learning needs/ comprehension: Will plan on initiating teaching when able.        Is pt able to demonstrate: 1. how to empty their pouch?  No:   2. How to read and write down correct I and O's?   No:     Effectiveness of current pouching/ supply plan: > 72 hrs         Plan:     Plan:   ? Current pouching system: Kyara 57 mm  2 pc flat kyara high output pouch with barrier ring    Education:  ? Educational needs: Use of tape, How to empty pouch, Removal of pouch, Preparation of new pouch, Cutting out or evaluating a pattern, Applying paste or rings, Applying appliance to abdomen, Peristomal skin care, " Diet and hydration / fluid balance, Odor / flatus management and Lifestyle Adjustments    ? Continue to prepare for discharge:  Supplies ordered, Completed supply list, Registered for samples from , Prescriptions or note left on chart for MD to sign/complete, Prepared for discharge to home with homecare, Discussed making a [facility] Nurse outpatient appointment upon discharge, Discussed when to follow up with a [facility] Nurse in the future and Discussed how/ where to order supplies   ? Do WO Nurse recommend home care?  yes  Plan for ileostomy and Mucus fistula: RN to change pouch twice a week. [facility] will initiate teaching once pt is stable.  Supplies Needed  Kyara (2pc 57mm)  Wafer- (#[id number])  Pouch- (#[id number])  or  High output pouch - (#[id number]) depending upon output  Barrier ring- (#[id number])     Ileostomy care- Change pouch twice a week  1. Gather all supplies and remove old pouch gently.  2. Cleanse peristomal skin with w arm water and soft wash cloth or gauze and dry thoroughly.  3. Cut the wafer to fit to stoma or use given pattern, apply barrier ring around the cutting surface and apply centering the stoma.  4. Attach the pouch   5. Massage around it for better adherence.     Face to face time: total 40 mins

## 2017-07-31 NOTE — PLAN OF CARE
Problem: Goal Outcome Summary  Goal: Goal Outcome Summary  Outcome: No Change  /89 (BP Location: Right arm)  Pulse 94  Temp 99  F (37.2  C) (Oral)  Resp 22  Ht 1.829 m (6')  Wt 94.4 kg (208 lb 1.6 oz)  SpO2 98%  BMI 28.22 kg/m2     Patient a bit hypertensive with Tmax of 102 axillary. Rechecked for 99.7 orally, given tylenol through NJ and rechecked for 99.  to 95 on 4- 0.5 units/hr. Denies pain or nuasea. Nectar-thickened liquids for diet. 3L IJ infusing NS at TKO, LR at 20 mL/hr with insulin gtt. PIV saline locked. Incontinent of urine throughout the shift, ostomy with 1L output. Turning and repositioning with assist of 2. Continue with POC and notify MD with changes or concerns.

## 2017-07-31 NOTE — PROGRESS NOTES
Surgery Cross-Cover Note  7/30/2017 7:34 PM     Was paged regarding fever.  Patient is asymptomatic     /89  Pulse 87  Temp 102.1  F (38.9  C) (Oral)  Resp 28  Ht 1.829 m (6')  Wt 94.4 kg (208 lb 1.6 oz)  SpO2 95%  BMI 28.22 kg/m2    I/O last 3 completed shifts:  In: 4346.64 [P.O.:980; I.V.:746.64; NG/GT:1420]  Out: 5730 [Urine:940; Drains:590; Stool:4200]    Exam:  Alert, comfortable and communicative  Abd: soft, non tender  Wound: doesn't look infected  Drain output: bilious      Assessment/Plan:  Not in sepsis at the moment  - continue with current management    Cuca Pedersen MD  PGY-1 General Surgery  (Please page the morning team after 6 am)

## 2017-07-31 NOTE — PROGRESS NOTES
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  Now s/p ex lap with R hemicolectomy, end ileostomy, mucus fistula, partial omenectomy on 7/26.    Good glucose control with much lower need overnight than during the day.    Plan:  -give NPH 15 units qAM, first dose ASAP, then stop insulin infusion 2 hours later  -continue Glargine 48 units q24 h PM  -meal aspart 1unit/7g CHO ordered for meals and snacks  -aspart high correction q4h starting this evening       Outpatient diabetes follow up: Dr. Eckert at [facility]             Interval History:   The last 24 hours progress and nursing notes reviewed.  Insulin need overnight down to 0 and 0.5 units/h with glargine 48 units given last evening.  Rates rising this morning.  Yesterday daytime rates up to 6 units/h.  Today, rates progressively rising since 0800, up to 1.5 units/h with BG 160s.  TF is Nepro continuous at 60.  Tolerating TF alright.  Allowed diet, but no appetite.  Has had milks and likes supplements.    Slept pretty well last night, despite finger sticks.      Recent Labs  Lab 07/31/17  1108 07/31/17  1005 07/31/17  0912 07/31/17  0802 07/31/17  0605 07/31/17  0546 07/31/17  0519  07/30/17  1739  07/30/17  0605  07/29/17  1715  07/29/17  0336  07/28/17  1648   GLC  --   --   --   --   --  101*  --   --  160*  --  155*  --  151*  --  113*  --  136*   * 169* 163* 134* 102*  --  95  < >  --   < >  --   < >  --   < >  --   < >  --    < > = values in this interval not displayed.            Review of Systems:   See interval hx          Medications:   PTA taking Januvia and glargine versus glargine and  aspart per carb    Active Diet Order      Full Liquid Diet Nectar Thickened Liquids (pre-thickened or use instant food thickener)     Physical Exam:  Gen:  resting in recliner, in NAD.    HEENT: NC/AT, mucous membranes are moist, NJ feeding tube  Resp: unlabored at rest  Neuro: alert, oriented to self and place, calm  /88 (BP Location: Right arm)  Pulse 92  Temp 98.4  F (36.9  C)  Resp 20  Ht 1.829 m (6')  Wt 94.4 kg (208 lb 1.6 oz)  SpO2 97%  BMI 28.22 kg/m2           Data:     Lab Results   Component Value Date    A1C  07/06/2017     Canceled, Test credited   Below Assay Range  NOTIFIED LEONARD ONEILL AT 0538 ON 7/6/17 BY CHRISSY      A1C 9.4 02/16/2017    A1C 6.2 12/14/2016    A1C 6.1 10/27/2016    A1C 8.3 07/02/2012            Recent Labs   Lab Test  07/31/17   0546  07/30/17   1739   NA  147*  148*   POTASSIUM  4.1  4.1   CHLORIDE  121*  121*   CO2  19*  19*   ANIONGAP  8  8   GLC  101*  160*   BUN  56*  55*   CR  1.05  1.06   JACOB  8.0*  7.9*     CBC RESULTS:   Recent Labs   Lab Test  07/31/17   0546   WBC  4.9   RBC  2.77*   HGB  7.7*   HCT  24.6*   MCV  89   MCH  27.8   MCHC  31.3*   RDW  16.4*   PLT  105*     Janice Guzman APRN -[id number]    Diabetes Management job code [id number]

## 2017-07-31 NOTE — PLAN OF CARE
Problem: Goal Outcome Summary  Goal: Goal Outcome Summary  Outcome: No Change  Tmax 100.6 orally, tachy in low 100's.  Satting 95% on RA.  Pt encouraged to us IS. Lactic 1.0, blood cultures and xray done.  Denies pain and nausea.  TF at 60ml/hr, free water flushes at 60ml/hr.  He is incontinent of urine, illesotomy with very liquid output.  Insulin gtt off, now on subq insulin.  Pt up with PT.  Will continue POC.

## 2017-07-31 NOTE — PROGRESS NOTES
[facility]  Transplant Infectious Diseases Inpatient Progress Note      Camacho Bhagat MRN# [id number]   YOB: 1965 Age: 51 year old   Date of Service: 7/31/2017  Transplant: 3/4/2017 (Liver), Postoperative day: 149          Recommendations:   - Agree with increasing the ganciclovir dose to 10 mg / kg (double the standard induction dose for possible ganciclovir low-grade resistant virus) given his improved CrCl (> 95).  - Continue broad spectrum meropenem/daptomycin/micafungin to complete at least a ten day course post-op (through 8/5/17) -- then consider tapering antimicrobials (if afebrile).   - Continue to check quantitative blood CMV PCRs weekly (next due on 8/3/17) and blood EBV PCRs every other week (next on 8/1/17).  - follow the ANC carefully.  - Check a CPK weekly (next 8/1/17) while on daptomycin.    Transplant ID will follow with you.    Edwin Del Toro MD  Pager [phone]        Summary of Presentation:   Transplants:  3/4/2017 (Liver), Postoperative day:  149     A 51 year old gentleman with CASTAÑEDA s/p OLT in 3/2017. Basilixima for induction.   MMF was held due to neutropenia and multiple infections upon admission. TAC was held 7/25/2017 for perforated viscus.      Presented with colitis, s/p exploratory laparotomy. Attributed to neutropenic colitis.   Then started to develop CMV viremia (primary infection)  Now with intra-abdominal abscess and severe sepsis.         Infectious Diseases Issues:   1. Severe sepsis picture 7/25/2017.  Now with persistent fevers.  2. Intra-abdominal abscess s/p evacuation and possible perforated viscus.  Underwent exp lap with hemicolectomy on 7/26/2017.   Intraoperatively no perforation was noted, right colitis was noted with intra-abdominal abscess.   Will follow on pathology (still pending).   The persistent fever is likely due to persistent inflammatory process in the abdomen, it will eventually subside. If persists longer than  expected, I would have low threshold to repeat CT abdomen and pelvis.  We have little to suggest the presence of any pneumonia.     3. Neutropenic vs CMV vs PTLD colitis (without ischemia per exp lap upon admission)   4. CMV primary infection (donor derived)  5. EBV primary infection (donor derived)  The initial colitis at presentation on admission was thought to be due to neutropenia-induced colitis. Later during admission CMV viremia ensued and the possibility of CMV colitis was entertained, the CMV viremia was at low level and colonoscopy was recommended by ID to ascertain whether the patient has end organ damage with CMV colitis. Colonoscopy done on 7/21/2017 with poor prep and negative random biopsies (including no evidence of CMV CPE).   Finished 14 days of ertapenem on 7/18/2017 for colitis. The neutropenia was attributed to drugs (MMF/bactrim/VGCV, all were DCed prior to presentation).   Now with a suspected perforated viscus and peritoneal gas on broad spectrum ABx so taken to OR on 6/26/17.     The primary late CMV infection was donor derived following discontinuation of VGCV and in part due to acute illness as well.   Whether the patient also has CMV colitis is not clear, random colon biopsies were negative. Will wait for the pathology from the 6/26/17 hemicolectomy procedure.   The patient needs to be treated for primary CMV infection anyway due to CMV viral load above the threshold of 1500 IU/mL.   IV GCV was initiated 7/20/2017 (level was 1900 IU/mL), the dose was increased to high dose (double dose for CrCl) therapy on 6/25/2017, since the viremia occurred while on and off VGCV for ppx and potential resistance is possible.  Unfortunately, the 7/27/17 CMV drug resistance genotype assay sample went awry.  Good response with decreasing viral load to 290 IU/mL on 7/27/2017.  We should continue to follow weekly CMV PCR.   The pathology of the hemicolectomy should help ruling out/in CMV colitis.     Random  colon biopsies with colonoscopy were also not suggestive of PTLD.   Continue to monitor EBV PCR every other week.   Ascitic fluid cytology and leukemia/lymphoma studies negative.   MRI of brain not suggestive of CNS PTLD.   The pathology of the hemicolectomy should help ruling out/in PTLD.     6. Resolved EJ   Was due to severe sepsis.  Creatinine is now down to < 1.     7. Encephalopathy.   Was also toxic-metabolic, in part due to sepsis, and seems to be improving.  He had a negative brain MRI without lesions.     8. 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    These are likely all colonizers or contaminants.  The patient was started on daptomycin for severe sepsis and colonization with VRE.     9. Staphylococcus capitis in ascitic fluid 7/5/2017   Contamination.     10. Rhinovirus in BAL 7/15/2017  Likely to be due to chronic shedding since the patient's main presentation was mostly abdominal not respiratory.     Other ID issues:  - PCP prophylaxis: bactrim DCed in June of 2017 due to neutropenia.   - Serostatus: CMV D+/R-, EBV D+/R-, HSV1?/2?, VZV ?   Now he's on GCV IV.   - Immunization status: up-to-date  - Gamma globulin status: >1660 as of 7/24/2017.     Interval History:  7/21/2017: Underwent colonoscopy but with poor prep, it was poor study. The patient started to spike intermittent low grade fevers.    7/25/2017: Patient is obtunded, shallow breathing. Looking septic. Was transferred to SICU and was intubated.   7/26/2017: underwent exp lap with right hemicolectomy, end ileostomy, and mucus fistula.   7/28/2017: extubated  7/29/2017: pleasantly confused but oriented to place and self, follows commands and answers questions but not totally appropriately. C/o abdominal pain, no other complaints.    7/31/2017:  Mr. Bhagat remains instermittently febrile, T max 102.1 degrees last night with a normal peripheral WBC (4.9) on  ongoing meropenem, daptomycin, micafungin, and ganciclovir.  His lactic acid remains normal.  His creatinine has improved (from 2.90 on 7/25/17 to 0.95 today) so the ganciclovir dose is again increased.  He was extubated 7/29/17 (after his 7/26/17 right hemicolectomy) and seems to be breathing comfortable without resting dyspnea on room air.  His chest x-ray today shows improvement in his diffuse, edematous infiltrates.  He remains mildly confused, but lacks new acute complaints today, including no nausea, chest pain, EENT symptoms, or skin problem.  He is on tube feeds at goal but his ileostomy output is high (so loperamide added).  Blood cultures from 7/25 and 7/31/17 are NGTD; ascites cultures from 7/26/17 also are without growth.  A repeat blood CMV PCR is pending.  The 7/27/17 CMV drug resistance genotype assay was cancelled.  His glucose control is improved and his insulin drip was discontinued.    HPI (in summary, copied from the 7/29/17 ID Transplant Note):  CASTAÑEDA s/p OLT in 3/2017.   Presented with abdominal pain and underwent exploratory laparotomy which was not c/w with ischemia. It was felt to be related to neutropenia.   The patient has neutropenia since June of 2017 attributed to immunosuppressive therapy/bactrim/VGCV.   Bactrim DCed June of 2017 and VGCV was DCed at unknown tome.     The patient was started on broad spectrum ABx with persistent fever.   Bronchoscopy done 7/12/2017 grew single colony of VRE which was considered a colonizer.   Ascitic fluid checked on 7/5/2017 and grew S capitis considered a contaminant.   Repeat BAL on 7/15/2017 and repeat paracentesis on 7/7/14/2017 were unremarkable for growth but the ascitic fluid was exudative.     The course was complicated by thrombosis of the right radial artery with ischemia to to the tip of the right index finger.   He also has ischemia to the right hallux and second toe.     The patient finished a course of ertapenem, his mental status started  to improve.   CMV PCR became detected at low level and workup for possible CMV colitis was initiated. Colonoscopy was with poor prep and random biopsies were negative for colitis including CMV colitis. GCV was initiated on 7/20/2017 (repeat CMV PCR was around 1900 IU/mL).     The patient started to spike low grade fever on 7/21/2017, on 7/25/2017 he suffered from acute respiratory distress and became obtunded with distended abdomen. He was transferred to ICU and was intubated. The patient was started on daptomycin/meropenem/micafungin. GCV dose was increased   CT c/a/p showed large RUQ intraabdominal extraluminal gas with possible feculent material.   He was taken to OR on 7/26/2017 and underwent exp lap with right hemicolectomy, end ileostomy, and mucus fistula -- histopahtology is still pending.     Review of Systems:  As per Interval History.  Complete ROS is otherwise negative.    Current Scheduled Medications:    tacrolimus  4 mg Oral BID IS     insulin isophane human  15 Units Subcutaneous QAM     insulin aspart  1-12 Units Subcutaneous Q4H     fiber modular  1 packet Per Feeding Tube TID     ganciclovir (CYTOVENE) intermittent infusion  10 mg/kg (Dosing Weight) Intravenous Q12H     loperamide  2 mg Oral or Feeding Tube 4x Daily     insulin aspart   Subcutaneous TID w/meals     insulin glargine  48 Units Subcutaneous Q24H     meropenem  1 g Intravenous Q8H     protein modular  1 packet Per Feeding Tube TID     DAPTOmycin (CUBICIN) intermittent infusion  8 mg/kg (Dosing Weight) Intravenous Q24H     micafungin  100 mg Intravenous Q24H     aspirin  80 mg Oral Daily     multivitamins with minerals  15 mL Per Feeding Tube Daily     heparin  5,000 Units Subcutaneous Q8H     pantoprazole  40 mg Oral or Feeding Tube Daily     sodium chloride (PF)  3 mL Intracatheter Q8H     Allergies:  Allergies   Allergen Reactions     Erythromycin GI Disturbance     Vioxx      Nausea, vomiting     Physical Examination:  Vital sign  ranges over the past 24 hours:  Temp:  [98.4  F (36.9  C)-102  F (38.9  C)] 98.8  F (37.1  C)  Pulse:  [92-94] 92  Heart Rate:  [101-105] 101  Resp:  [20-30] 20  BP: (152-176)/(81-99) 152/99  SpO2:  [95 %-98 %] 98 %    Vitals:    07/25/17 0900 07/26/17 0400 07/27/17 0400 07/29/17 0000   Weight: 112 kg (246 lb 14.4 oz) 106.2 kg (234 lb 2.1 oz) 99 kg (218 lb 4.1 oz) 98.2 kg (216 lb 7.9 oz)    07/30/17 1332   Weight: 94.4 kg (208 lb 1.6 oz)     Intake/Output Summary (Last 24 hours) at 07/31/17 1615  Last data filed at 07/31/17 1607   Gross per 24 hour   Intake             2000 ml   Output             2180 ml   Net             -180 ml     Physical Examination:  GENERAL:  Awake, responsive, 51 year old man in bed in no acute distress.  HEAD:  NCAT.  EYES:  EOMI, PERRL, anicteric sclerae.  ENT:  No otorrhea.  NJ tube present.  No nasal cannula present.  No anterior oral lesion.  NECK:  Supple.  LYMPH:  No cervical lymphadenopathy  LUNGS:  Few bilaterally scattered coarse rhonchi.  CARDIOVASCULAR:  Regular rate and rhythm, tachycardic, normal S1, S2, without murmur, gallop, or rub.  ABDOMEN:  Normal bowel sounds, soft, mild diffuse tenderness, midline wound is intact without inflammation with proximal mucous fistula, RLQ ileostomy present.  BACK:  No CVA tenderness.  :  No Crowe.  EXTREMITIES:  Distally warm, no edema,  ischemic right hallux and right second toe, also ischemic tip of left second toe, and right index, no ulcers.  Heel boots present.  SKIN:  No acute rash or lesion beyond above.  Right IJ line site lacks inflammation.  NEUROLOGIC:  Alert, oriented x 2, mildly confused, moves extremities x 4.    Laboratory Data:   No results found for: ACD4    Inflammatory Markers    Recent Labs   Lab Test  07/05/17   1810  03/05/17   0358  11/23/16   0813  11/16/16   0706  11/15/16   1039  11/07/16   0759  11/03/16   0949  11/01/16   0853   08/15/11   1151  04/11/11   1209  01/03/11   1246  02/15/10   1232   SED   --    --    45*   --    --    --    --    --    --   11  14  17*  25*   CRP  354.0  20.0*  58.0*  59.1*  49.8*  66.0*  140.0*  150.0*   < >  11.1*  9.0*  11.7*  17.5*    < > = values in this interval not displayed.     Immune Globulin Studies     Recent Labs   Lab Test  07/24/17   0705  08/15/11   1243   IGG  1660*  1160   IGG1   --   734   IGG2   --   294   IGG3   --   7*   IGG4   --   59     Metabolic Studies       Recent Labs   Lab Test  07/31/17   1229  07/31/17   0546  07/30/17   1739  07/30/17   0826  07/30/17   0605  07/29/17   1715  07/29/17   0336  07/28/17   1648   07/25/17   0945   07/06/17   0316   NA   --   147*  148*   --   147*  146*  149*  146*   < >  137   < >  143   POTASSIUM   --   4.1  4.1   --   3.8  3.8  3.6  3.8   < >  4.0   < >  5.0   CHLORIDE   --   121*  121*   --   118*  116*  118*  115*   < >  104   < >  116*   CO2   --   19*  19*   --   20  21  21  20   < >  26   < >  18*   ANIONGAP   --   8  8   --   9  8  10  11   < >  7   < >  9   BUN   --   56*  55*   --   60*  65*  64*  70*   < >  77*   < >  62*   CR   --   1.05  1.06   --   1.10  1.28*  1.54*  1.82*   < >  2.60*   < >  2.75*   GFRESTIMATED   --   74  73   --   70  59*  48*  39*   < >  26*   < >  24*   GLC   --   101*  160*   --   155*  151*  113*  136*   < >  382*   < >  139*   A1C   --    --    --    --    --    --    --    --    --    --    --   Canceled, Test credited   Below Assay Range  NOTIFIED LEONARD ONEILL AT 0538 ON 7/6/17 BY CHRISSY     JACOB   --   8.0*  7.9*   --   8.1*  7.9*  7.8*  8.1*   < >  7.6*   < >  6.9*   PHOS   --   2.9  2.3*   --   2.6  3.0  4.7*  5.5*   < >   --    < >  5.5*   MAG   --   2.1  2.1   --   2.2  2.3  2.5*  2.5*   < >   --    < >  2.1   LACT  1.0   --    --   0.6*   --    --    --    --    < >   --    < >  1.5   CKT   --    --    --    --    --    --    --    --    --   40   --    --     < > = values in this interval not displayed.     Hepatic Studies    Recent Labs   Lab Test  07/27/17   0410  07/25/17   7145   07/25/17   0945  07/25/17   0017  07/24/17   0705  07/23/17   0625  07/21/17   0615   07/11/17   0328   07/05/17   1810   BILITOTAL  1.3  0.6  0.5   --   0.4  0.3  0.5   < >  0.5   < >   --    ALKPHOS  143  242*  317*   --   264*  224*  297*   < >  158*   < >   --    ALBUMIN  2.4*  2.0*  1.9*   --   1.7*  1.9*  1.9*   < >  1.8*   < >   --    AST  27  61*  90*   --   86*  63*  75*   < >  34   < >   --    ALT  27  50  60   --   54  47  51   < >  27   < >   --    LDH   --    --    --   258*   --    --    --    --    --    --   289*   GGT   --    --    --    --    --    --    --    --   58   --    --     < > = values in this interval not displayed.     Pancreatitis testing    Recent Labs   Lab Test  07/24/17   0705  07/17/17   0630  07/12/17   0342  07/10/17   0340  07/05/17   0346  03/05/17   0358  03/03/17   1458  02/21/17   1520  12/09/16   1159  02/08/16   1144   09/30/10   1734   AMYLASE   --    --    --    --    --   53  70   --    --    --    --   82   LIPASE   --    --    --    --    --   216   --   326  161   --    --   138   TRIG  303*  168*  114  177*  174*   --    --    --    --   99   < >   --     < > = values in this interval not displayed.     Hematology Studies      Recent Labs   Lab Test  07/31/17   0546  07/30/17   0605  07/29/17   0336  07/28/17   0410  07/27/17   0410  07/26/17   2243   07/26/17   0431   WBC  4.9  5.2  6.3  9.3  10.0  10.3   --   7.7   ANEU  2.8  3.2  4.4  7.1  7.9   --    --   5.6   ALYM  1.7  1.6  1.5  1.6  1.6   --    --   1.6   ZINA  0.3  0.3  0.2  0.5  0.4   --    --   0.3   AEOS  0.1  0.1  0.1  0.1  0.1   --    --   0.1   HGB  7.7*  8.2*  8.1*  8.0*  9.3*  9.1*   < >  5.9*   HCT  24.6*  25.8*  24.8*  24.4*  27.7*  27.5*   --   17.6*   PLT  105*  125*  121*  128*  144*  136*   < >  109*    < > = values in this interval not displayed.     Arterial Blood Gas Testing    Recent Labs   Lab Test  07/26/17   2243  07/26/17   2133  07/26/17 2046  07/26/17 2017 07/25/17   1740   07/25/17   1037  07/08/17   0902   PH  Incorrect specimen type  NOTTIED BY WOJCIECH DEWITT, NEW ORDER TO REPLACE.7/26/17 AT 2346 BY ZAINAB.  CORRECTED ON 07/26 AT 2350: PREVIOUSLY REPORTED AS 7.27     --    --   Canceled, Test credited   Incorrect specimen type    7.32*  7.14*  7.32*   PCO2  Incorrect specimen type  NOTTIED BY WOJCIECH DEWITT, NEW ORDER TO REPLACE.7/26/17 AT 2346 BY ZAINAB.  CORRECTED ON 07/26 AT 2350: PREVIOUSLY REPORTED AS 45     --    --   Canceled, Test credited   Incorrect specimen type    42  69*  35   PO2  Incorrect specimen type  NOTTIED BY WOJCIECH DEWITT, NEW ORDER TO REPLACE.7/26/17 AT 2346 BY JS.  CORRECTED ON 07/26 AT 2350: PREVIOUSLY REPORTED AS 53     --    --   Canceled, Test credited   Incorrect specimen type    81  103  96   HCO3  Incorrect specimen type  NOTTIED BY WOJCIECH DEWITT, NEW ORDER TO REPLACE.7/26/17 AT 2346 BY ZAINAB.  CORRECTED ON 07/26 AT 2350: PREVIOUSLY REPORTED AS 20     --    --   Canceled, Test credited   Incorrect specimen type    22  23  18*   O2PER  Incorrect specimen type  NOTTIED BY WOJCIECH DEWITT, NEW ORDER TO REPLACE.7/26/17 AT 2346 BY ZAINAB.  CORRECTED ON 07/26 AT 2350: PREVIOUSLY REPORTED AS 40    40  62  100  100.0  100  40.0  7L  30     Urine Studies     Recent Labs   Lab Test  07/28/17   1042  07/25/17   1205  07/19/17   1150  07/11/17   1105  07/06/17   1441   URINEPH  5.0  5.0  5.0  5.0  5.0   NITRITE  Negative  Negative  Negative  Negative  Negative   LEUKEST  Negative  Trace*  Negative  Negative  Trace*   WBCU  6*  5*  2  <1  9*     Vancomycin Levels     Recent Labs   Lab Test  07/06/17   0316  10/28/16   1405   VANCOMYCIN  22.1  14.4     Tacrolimus levels    Invalid input(s): TACROLIMUS, TAC, TACR  Transplant Immunosuppression Labs Latest Ref Rng & Units 7/31/2017 7/30/2017 7/30/2017 7/29/2017 7/29/2017   Tacro Level 5.0 - 15.0 ug/L <3.0  Tacrolimus Reference Range   Kidney Transplant   Pediatric                      ug/L     0-3 months post transplant   10-12     3-6  months post transplant   8-10     6-12 months post transplant  6-8     >12 months post transplant   4-7   Adult     0-6 months post transplant   8-10     6-12 months post transplant  6-8     >12 months post transplant   4-6     >5 years post transplant     3-5   Heart Transplant   Pediatric     0-12 months post transplant  10-15     >12 months post transplant   5-10   Adult     0-3 months post transplant   10-15     3-6 months post transplant   8-12     6-12 months post transplant  6-12     >12 months post transplant   6-10   Lung Transplant     0-12 months post transplant  10-15     >12 months post transplant   8-12   Liver Transplant   Pediatric     0-3 months post transplant   10-15     3-6 months post transplant   8-10     >6 months post transplant    6-8   Adult     0-3 months post transplant   10-12     3-6 months post transplant   8-10     >6   months post transplant    6-8   Pancreas Transplant     0-6 months post transplant   8-10     >6 months post transplant    5-8   This test was developed and its performance characteristics determined by the   [facility],  Special Chemistry Laboratory. It has   not been cleared or approved by the FDA. The laboratory is regulated under CLIA   as qualified to perform high-complexity testing. This test is used for clinical   purposes. It should not be regarded as investigational or for research.  (L) - <3.0  Tacrolimus Reference Range   Kidney Transplant   Pediatric                      ug/L     0-3 months post transplant   10-12     3-6 months post transplant   8-10     6-12 months post transplant  6-8     >12 months post transplant   4-7   Adult     0-6 months post transplant   8-10     6-12 months post transplant  6-8     >12 months post transplant   4-6     >5 years post transplant     3-5   Heart Transplant   Pediatric     0-12 months post transplant  10-15     >12 months post transplant   5-10   Adult     0-3 months post transplant    10-15     3-6 months post transplant   8-12     6-12 months post transplant  6-12     >12 months post transplant   6-10   Lung Transplant     0-12 months post transplant  10-15     >12 months post transplant   8-12   Liver Transplant   Pediatric     0-3 months post transplant   10-15     3-6 months post transplant   8-10     >6 months post transplant    6-8   Adult     0-3 months post transplant   10-12     3-6 months post transplant   8-10     >6   months post transplant    6-8   Pancreas Transplant     0-6 months post transplant   8-10     >6 months post transplant    5-8   This test was developed and its performance characteristics determined by the   [facility],  Special Chemistry Laboratory. It has   not been cleared or approved by the FDA. The laboratory is regulated under CLIA   as qualified to perform high-complexity testing. This test is used for clinical   purposes. It should not be regarded as investigational or for research.  (L) - -   Tacro Level - Not Provided - Whole blood, EDTA anticoagulant - -   Creat 0.66 - 1.25 mg/dL 1.05 1.06 1.10 1.28(H) 1.54(H)   BUN 7 - 30 mg/dL 56(H) 55(H) 60(H) 65(H) 64(H)   WBC 4.0 - 11.0 10e9/L 4.9 - 5.2 - 6.3   Neutrophil % 57.1 - 62.4 - 70.2   ANEU 1.6 - 8.3 10e9/L 2.8 - 3.2 - 4.4     CSF testing     Recent Labs   Lab Test  06/11/09   0225   CWBC  1   CRBC  2   CGLU  104*   CTP  54     Microbiology:  Ascites  7/26/2017 (OR) negative to date.   Gram stain:  NOS, few WBCs.    7/25/2017 negative to date.  Gram stain:  NOS, no WBCs.    7/5/2017 S capitis    BAL   7/15/2017 yeast and Rhinovirus     7/12/2017 single colony of VRE, C albicans/dubliniensis   Sputum  7/29/2017 Normal elvie, heavy C albicans    7/11/2017 VRE and Coag Neg Staph     Bcxs:    7/31 x 2, 7/28 x 2, 7/25 x 2, 7/15 NGTD    Ur cxs  7/25 negative    Last check of C difficile  C Diff Toxin B PCR   Date Value Ref Range Status   10/27/2016  NEG Final    Negative  Negative:  Clostridium difficile target DNA sequences NOT detected, presumed   negative for Clostridium difficile toxin B or the number of bacteria present   may be below the limit of detection for the test.   FDA approved assay performed using [employer] GeneXpert real-time PCR.   A negative result does not exclude actual disease due to Clostridium difficile   and may be due to improper collection, handling and storage of the specimen or   the number of organisms in the specimen is below the detection limit of the   assay.       Virology:    CMV viral loads    Recent Labs   Lab Test  07/27/17   1109  07/20/17   1427  07/18/17   0530  07/15/17   1553  07/10/17   0340   CSPEC  EDTA PLASMA  CORRECTED ON 07/28 AT 0840: PREVIOUSLY REPORTED AS Blood    Plasma  Plasma  Bronchoalveolar Lavage  EDTA PLASMA   CMVLOG  2.5*  3.3*  3.0*  3.6*  2.2*     CMV viral loads    Log IU/mL of CMVQNT   Date Value Ref Range Status   07/27/2017 2.5 (H) <2.1 [Log_IU]/mL Final   07/20/2017 3.3 (H) <2.1 [Log_IU]/mL Final   07/18/2017 3.0 (H) <2.1 [Log_IU]/mL Final   07/15/2017 3.6 (H) <2.1 [Log_IU]/mL Final   07/10/2017 2.2 (H) <2.1 [Log_IU]/mL Final   07/03/2017 <2.1 <2.1 [Log_IU]/mL Final   06/26/2017 Not Calculated <2.1 [Log_IU]/mL Final     CMV resistance testing  Recent Labs   Lab Test  07/27/17   1109   CMVCID  Canceled, Test credited   Unsatisfactory specimen   Quantity not sufficient   Notification of test cancellation was given to  Naseem Kemp.7/28/17 at 1408.TV.     CMVFOS  Canceled, Test credited   Unsatisfactory specimen   Quantity not sufficient   Notification of test cancellation was given to  Naseem Kemp.7/28/17 at 1408.TV.     CMVGAN  Canceled, Test credited   Unsatisfactory specimen   Quantity not sufficient   Notification of test cancellation was given to  Naseem Kemp.7/28/17 at 1408.TV.       CMV Cidofovir Resist   Date Value Ref Range Status   07/27/2017   Final    Canceled, Test credited   Unsatisfactory specimen   Quantity  not sufficient   Notification of test cancellation was given to  Naseem Kemp.7/28/17 at 1408.TV.       CMV Foscarnet Resist   Date Value Ref Range Status   07/27/2017   Final    Canceled, Test credited   Unsatisfactory specimen   Quantity not sufficient   Notification of test cancellation was given to  Naseem Kemp.7/28/17 at 1408.TV.       CMV Ganciclovir Resis   Date Value Ref Range Status   07/27/2017   Final    Canceled, Test credited   Unsatisfactory specimen   Quantity not sufficient   Notification of test cancellation was given to  Naseem Kemp.7/28/17 at 1408.TV.       EBV DNA Copies/mL   Date Value Ref Range Status   07/18/2017 [id number] (A) EBVNEG [Copies]/mL Final     Pathology   Colectomy path 7/26/17  Still pending.    Colon biopsies 7/21/2017   A: Colon biopsy, ascending   B: Colon biopsy, descending   FINAL DIAGNOSIS:   A. COLON BIOPSY, ASCENDING:   - Colonic mucosa with no significant histologic abnormality   - Negative for intraepithelial lymphocytosis or deposition of   subepithelial thick collagenous band   B. COLON BIOPSY, DESCENDING:   - Crypt architecture distortion, consistent with healed prior injury   - Negative for active inflammation or dysplasia   - Negative for intraepithelial lymphocytosis or deposition of   subepithelial thick collagenous band     Cytology of ascitic fluid 7/25/2017   PERITONEAL FLUID, ASCITES:   -Negative for malignancy   Cytology of ascitic fluid 7/14/2017.   Peritoneal fluid, ascites:   - Negative for malignancy   - Acute and chronic inflammation present   Specimen Adequacy: Satisfactory for evaluation.   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   INTERPRETATION:   Ascites fluid:        Rare to absent viable B cells     COMMENT:   This specimen was collected on 7/14/17 but was not ordered/delivered to   lab/run until 7/18/17 - results should be interpreted with caution due   to low cellularity/viability.     Due to limited cellularity we were only able to  analyze the B cells.   There is no immunophenotypic evidence of B cell non-Hodgkin lymphoma.   Neoplastic cells, including large cells, may not survive specimen   processing. This sample may not be representative. Final interpretation   requires correlation with morphologic and clinical features.     Imaging:  CXR 7/31/2017  1. Improving perihilar and bibasilar opacities likely represent  infection/aspiration, atelectasis, or pulmonary edema.  Moderate  opacities bilaterally persist.   2. Stable small to moderate pleural effusions bilaterally.    CXR 7/28/2017  1.  Interval removal of the endotracheal tube.  2.  Increased perihilar opacities, worsening infection versus  pulmonary edema.   3.  Increased bilateral moderate pleural effusions with overlying  atelectasis/consolidation.     CT c/a/p 7/25/2017  1. New large heterogeneous area of right-sided retroperitoneal gas  which is highly concerning for an infectious process. Given the  history of prior neutropenic colitis and biopsies, there could also be  a component of stool from a ruptured viscus, despite the lack of  surrounding bowel loops and no extraluminal oral contrast. Surgical  consultation is recommended.   2. Bibasilar atelectasis versus consolidation with small bilateral  pleural effusions.  3. Extensive mesenteric and soft tissue edema with large volume  ascites. Numerous mesenteric and retroperitoneal lymph nodes which are  presumably reactive.  4. Postsurgical changes of liver transplant.    CT c/a/p7/13/2017  1. Improved moderate right-sided pleural effusion and resolution of  left-sided pleural effusion. There remain large areas of  atelectasis/consolidation in both lungs, including in the left lower  lobe despite resolution of left-sided pleural effusion. Superimposed  infectious process cannot be excluded.  2. Moderate-large amount of ascites increased from 7/8/2017, which  makes it difficult to evaluate for the presence or absence  of  inflammatory change or infectious process in the abdomen or pelvis. No  intra-abdominal abscess.  3. Stable small presumed hematoma within the ventral abdominal wall  fat.  4. Splenomegaly.    MRI brain 7/20/2017  1. Significant image degradation due to motion artifact, with no  definite acute intracranial pathology. No abnormal contrast enhancing  intracranial lesions.  2. Mucosal thickening in the sphenoid and maxillary sinuses and left  greater than right mastoid fluid are nonspecific in the setting of  recent intubation.

## 2017-07-31 NOTE — PLAN OF CARE
Problem: Goal Outcome Summary  Goal: Goal Outcome Summary  PT 7A: Pt agreeable to PT. Pt directed in transfer supine to right sidelying and sidelying to sitting with moderate assist of 2. Nursing assistant present to assist with transfer. Pt able to maintain sitting independently. Pt completed sit to stand with FWW and minimal assist of 2. Able to stand ~ 2 min with CGA of 1. Pt moves stand to sit with minimal assist of 1. Pt transferred bed to chair with FWW, line management, and moderate assist of 2. Pt declined LE exercises or further activity.     Recommend TCU at discharge to progress strengthening, transfers, and ambulation.

## 2017-08-01 ENCOUNTER — APPOINTMENT (OUTPATIENT)
Dept: PHYSICAL THERAPY | Facility: CLINIC | Age: 52
End: 2017-08-01
Attending: TRANSPLANT SURGERY
Payer: COMMERCIAL

## 2017-08-01 ENCOUNTER — APPOINTMENT (OUTPATIENT)
Dept: SPEECH THERAPY | Facility: CLINIC | Age: 52
End: 2017-08-01
Attending: TRANSPLANT SURGERY
Payer: COMMERCIAL

## 2017-08-01 LAB
ANION GAP SERPL CALCULATED.3IONS-SCNC: 5 MMOL/L (ref 3–14)
ANION GAP SERPL CALCULATED.3IONS-SCNC: 7 MMOL/L (ref 3–14)
BACTERIA SPEC CULT: NORMAL
BASOPHILS # BLD AUTO: 0 10E9/L (ref 0–0.2)
BASOPHILS NFR BLD AUTO: 0.4 %
BUN SERPL-MCNC: 51 MG/DL (ref 7–30)
BUN SERPL-MCNC: 52 MG/DL (ref 7–30)
CALCIUM SERPL-MCNC: 7.9 MG/DL (ref 8.5–10.1)
CALCIUM SERPL-MCNC: 8.1 MG/DL (ref 8.5–10.1)
CHLORIDE SERPL-SCNC: 122 MMOL/L (ref 94–109)
CHLORIDE SERPL-SCNC: 123 MMOL/L (ref 94–109)
CK SERPL-CCNC: 16 U/L (ref 30–300)
CO2 SERPL-SCNC: 20 MMOL/L (ref 20–32)
CO2 SERPL-SCNC: 22 MMOL/L (ref 20–32)
COPATH REPORT: NORMAL
CREAT SERPL-MCNC: 0.9 MG/DL (ref 0.66–1.25)
CREAT SERPL-MCNC: 0.9 MG/DL (ref 0.66–1.25)
DIFFERENTIAL METHOD BLD: ABNORMAL
EOSINOPHIL # BLD AUTO: 0.1 10E9/L (ref 0–0.7)
EOSINOPHIL NFR BLD AUTO: 1.7 %
ERYTHROCYTE [DISTWIDTH] IN BLOOD BY AUTOMATED COUNT: 16.6 % (ref 10–15)
FUNGUS SPEC CULT: NORMAL
GFR SERPL CREATININE-BSD FRML MDRD: 88 ML/MIN/1.7M2
GFR SERPL CREATININE-BSD FRML MDRD: 89 ML/MIN/1.7M2
GLUCOSE BLDC GLUCOMTR-MCNC: 123 MG/DL (ref 70–99)
GLUCOSE BLDC GLUCOMTR-MCNC: 135 MG/DL (ref 70–99)
GLUCOSE BLDC GLUCOMTR-MCNC: 150 MG/DL (ref 70–99)
GLUCOSE BLDC GLUCOMTR-MCNC: 178 MG/DL (ref 70–99)
GLUCOSE BLDC GLUCOMTR-MCNC: 179 MG/DL (ref 70–99)
GLUCOSE SERPL-MCNC: 141 MG/DL (ref 70–99)
GLUCOSE SERPL-MCNC: 200 MG/DL (ref 70–99)
HCT VFR BLD AUTO: 24.8 % (ref 40–53)
HGB BLD-MCNC: 7.9 G/DL (ref 13.3–17.7)
IMM GRANULOCYTES # BLD: 0 10E9/L (ref 0–0.4)
IMM GRANULOCYTES NFR BLD: 0.6 %
LYMPHOCYTES # BLD AUTO: 1.8 10E9/L (ref 0.8–5.3)
LYMPHOCYTES NFR BLD AUTO: 38.4 %
Lab: NORMAL
MAGNESIUM SERPL-MCNC: 1.9 MG/DL (ref 1.6–2.3)
MCH RBC QN AUTO: 28.5 PG (ref 26.5–33)
MCHC RBC AUTO-ENTMCNC: 31.9 G/DL (ref 31.5–36.5)
MCV RBC AUTO: 90 FL (ref 78–100)
MICRO REPORT STATUS: NORMAL
MICRO REPORT STATUS: NORMAL
MONOCYTES # BLD AUTO: 0.2 10E9/L (ref 0–1.3)
MONOCYTES NFR BLD AUTO: 4.7 %
NEUTROPHILS # BLD AUTO: 2.5 10E9/L (ref 1.6–8.3)
NEUTROPHILS NFR BLD AUTO: 54.2 %
NRBC # BLD AUTO: 0 10*3/UL
NRBC BLD AUTO-RTO: 0 /100
PHOSPHATE SERPL-MCNC: 3.1 MG/DL (ref 2.5–4.5)
PLATELET # BLD AUTO: 99 10E9/L (ref 150–450)
POTASSIUM SERPL-SCNC: 4.3 MMOL/L (ref 3.4–5.3)
POTASSIUM SERPL-SCNC: 4.4 MMOL/L (ref 3.4–5.3)
RBC # BLD AUTO: 2.77 10E12/L (ref 4.4–5.9)
SODIUM SERPL-SCNC: 148 MMOL/L (ref 133–144)
SODIUM SERPL-SCNC: 150 MMOL/L (ref 133–144)
SPECIMEN SOURCE: NORMAL
SPECIMEN SOURCE: NORMAL
TACROLIMUS BLD-MCNC: ABNORMAL UG/L (ref 5–15)
TME LAST DOSE: ABNORMAL H
WBC # BLD AUTO: 4.7 10E9/L (ref 4–11)

## 2017-08-01 PROCEDURE — 80048 BASIC METABOLIC PNL TOTAL CA: CPT

## 2017-08-01 PROCEDURE — 97110 THERAPEUTIC EXERCISES: CPT | Mod: GP

## 2017-08-01 PROCEDURE — 40000193 ZZH STATISTIC PT WARD VISIT

## 2017-08-01 PROCEDURE — 83735 ASSAY OF MAGNESIUM: CPT | Performed by: TRANSPLANT SURGERY

## 2017-08-01 PROCEDURE — 25000125 ZZHC RX 250: Performed by: INTERNAL MEDICINE

## 2017-08-01 PROCEDURE — 25000132 ZZH RX MED GY IP 250 OP 250 PS 637: Performed by: TRANSPLANT SURGERY

## 2017-08-01 PROCEDURE — 25000131 ZZH RX MED GY IP 250 OP 636 PS 637: Performed by: PHYSICIAN ASSISTANT

## 2017-08-01 PROCEDURE — 84100 ASSAY OF PHOSPHORUS: CPT

## 2017-08-01 PROCEDURE — 87799 DETECT AGENT NOS DNA QUANT: CPT

## 2017-08-01 PROCEDURE — 25000128 H RX IP 250 OP 636: Performed by: STUDENT IN AN ORGANIZED HEALTH CARE EDUCATION/TRAINING PROGRAM

## 2017-08-01 PROCEDURE — 36592 COLLECT BLOOD FROM PICC: CPT | Performed by: TRANSPLANT SURGERY

## 2017-08-01 PROCEDURE — 80197 ASSAY OF TACROLIMUS: CPT | Performed by: PHYSICIAN ASSISTANT

## 2017-08-01 PROCEDURE — 80048 BASIC METABOLIC PNL TOTAL CA: CPT | Performed by: TRANSPLANT SURGERY

## 2017-08-01 PROCEDURE — 00000146 ZZHCL STATISTIC GLUCOSE BY METER IP

## 2017-08-01 PROCEDURE — 83735 ASSAY OF MAGNESIUM: CPT

## 2017-08-01 PROCEDURE — 85025 COMPLETE CBC W/AUTO DIFF WBC: CPT

## 2017-08-01 PROCEDURE — 25000132 ZZH RX MED GY IP 250 OP 250 PS 637: Performed by: PHYSICIAN ASSISTANT

## 2017-08-01 PROCEDURE — 36592 COLLECT BLOOD FROM PICC: CPT

## 2017-08-01 PROCEDURE — 25000128 H RX IP 250 OP 636: Performed by: INTERNAL MEDICINE

## 2017-08-01 PROCEDURE — 12000025 ZZH R&B TRANSPLANT INTERMEDIATE

## 2017-08-01 PROCEDURE — 92526 ORAL FUNCTION THERAPY: CPT | Mod: GN

## 2017-08-01 PROCEDURE — 25000128 H RX IP 250 OP 636: Performed by: TRANSPLANT SURGERY

## 2017-08-01 PROCEDURE — 82550 ASSAY OF CK (CPK): CPT

## 2017-08-01 PROCEDURE — 25000125 ZZHC RX 250: Performed by: STUDENT IN AN ORGANIZED HEALTH CARE EDUCATION/TRAINING PROGRAM

## 2017-08-01 PROCEDURE — 27210432 ZZH NUTRITION PRODUCT RENAL BASIC LITER

## 2017-08-01 PROCEDURE — 25000128 H RX IP 250 OP 636: Performed by: PHYSICIAN ASSISTANT

## 2017-08-01 PROCEDURE — 27210913 ZZH NUTRITION PRODUCT INTERMEDIATE PACKET

## 2017-08-01 PROCEDURE — 97530 THERAPEUTIC ACTIVITIES: CPT | Mod: GP

## 2017-08-01 PROCEDURE — 40000225 ZZH STATISTIC SLP WARD VISIT

## 2017-08-01 PROCEDURE — 25000132 ZZH RX MED GY IP 250 OP 250 PS 637: Performed by: SURGERY

## 2017-08-01 PROCEDURE — 25000132 ZZH RX MED GY IP 250 OP 250 PS 637: Performed by: STUDENT IN AN ORGANIZED HEALTH CARE EDUCATION/TRAINING PROGRAM

## 2017-08-01 RX ADMIN — MICAFUNGIN SODIUM 100 MG: 10 INJECTION, POWDER, LYOPHILIZED, FOR SOLUTION INTRAVENOUS at 13:21

## 2017-08-01 RX ADMIN — GANCICLOVIR SODIUM 950 MG: 500 INJECTION, POWDER, LYOPHILIZED, FOR SOLUTION INTRAVENOUS at 08:12

## 2017-08-01 RX ADMIN — INSULIN ASPART 2 UNITS: 100 INJECTION, SOLUTION INTRAVENOUS; SUBCUTANEOUS at 14:38

## 2017-08-01 RX ADMIN — OXYCODONE HYDROCHLORIDE 5 MG: 5 SOLUTION ORAL at 09:16

## 2017-08-01 RX ADMIN — ACETAMINOPHEN 650 MG: 325 SOLUTION ORAL at 08:17

## 2017-08-01 RX ADMIN — GANCICLOVIR SODIUM 950 MG: 500 INJECTION, POWDER, LYOPHILIZED, FOR SOLUTION INTRAVENOUS at 20:18

## 2017-08-01 RX ADMIN — HEPARIN SODIUM 5000 UNITS: 5000 INJECTION, SOLUTION INTRAVENOUS; SUBCUTANEOUS at 08:20

## 2017-08-01 RX ADMIN — Medication 1 PACKET: at 08:22

## 2017-08-01 RX ADMIN — LOPERAMIDE HYDROCHLORIDE 2 MG: 1 SOLUTION ORAL at 09:01

## 2017-08-01 RX ADMIN — HEPARIN SODIUM 5000 UNITS: 5000 INJECTION, SOLUTION INTRAVENOUS; SUBCUTANEOUS at 16:31

## 2017-08-01 RX ADMIN — PANTOPRAZOLE SODIUM 40 MG: 40 TABLET, DELAYED RELEASE ORAL at 08:17

## 2017-08-01 RX ADMIN — TACROLIMUS 4 MG: 5 CAPSULE ORAL at 08:17

## 2017-08-01 RX ADMIN — Medication 1 PACKET: at 13:22

## 2017-08-01 RX ADMIN — Medication 1 PACKET: at 20:19

## 2017-08-01 RX ADMIN — MEROPENEM 1 G: 1 INJECTION, POWDER, FOR SOLUTION INTRAVENOUS at 12:35

## 2017-08-01 RX ADMIN — Medication 80 MG: at 08:17

## 2017-08-01 RX ADMIN — DAPTOMYCIN 750 MG: 500 INJECTION, POWDER, LYOPHILIZED, FOR SOLUTION INTRAVENOUS at 16:28

## 2017-08-01 RX ADMIN — MEROPENEM 1 G: 1 INJECTION, POWDER, FOR SOLUTION INTRAVENOUS at 21:06

## 2017-08-01 RX ADMIN — MULTIVIT AND MINERALS-FERROUS GLUCONATE 9 MG IRON/15 ML ORAL LIQUID 15 ML: at 08:17

## 2017-08-01 RX ADMIN — MEROPENEM 1 G: 1 INJECTION, POWDER, FOR SOLUTION INTRAVENOUS at 05:07

## 2017-08-01 RX ADMIN — TACROLIMUS 4 MG: 5 CAPSULE ORAL at 18:06

## 2017-08-01 RX ADMIN — LOPERAMIDE HYDROCHLORIDE 2 MG: 1 SOLUTION ORAL at 20:19

## 2017-08-01 RX ADMIN — Medication 2 G: at 12:44

## 2017-08-01 RX ADMIN — Medication 1 PACKET: at 20:21

## 2017-08-01 RX ADMIN — OXYCODONE HYDROCHLORIDE 5 MG: 5 SOLUTION ORAL at 16:28

## 2017-08-01 RX ADMIN — HEPARIN SODIUM 5000 UNITS: 5000 INJECTION, SOLUTION INTRAVENOUS; SUBCUTANEOUS at 01:15

## 2017-08-01 RX ADMIN — LOPERAMIDE HYDROCHLORIDE 2 MG: 1 SOLUTION ORAL at 16:28

## 2017-08-01 RX ADMIN — LOPERAMIDE HYDROCHLORIDE 2 MG: 1 SOLUTION ORAL at 11:30

## 2017-08-01 NOTE — PROGRESS NOTES
CLINICAL NUTRITION SERVICES - REASSESSMENT NOTE     Nutrition Prescription    RECOMMENDATIONS FOR MDs/PROVIDERS TO ORDER:  None at this time     Malnutrition Status:    Non-severe malnutrition in the context of acute on chronic illness    Recommendations already ordered by Registered Dietitian (RD):  Modified TF order to send Nutrisource Fiber from foodservice    Future/Additional Recommendations:  Minimize interruptions to TF   Ability to start calorie counts when oral intake improves     EVALUATION OF THE PROGRESS TOWARD GOALS   Diet: Full liquid with nectar thick liquids  Nutrition Support: Nepro @ goal 60 ml/hr (1440 ml/day) to provide 2592 kcals (28 kcal/kg/day), 117 g PRO (1.3 g/kg/day), 1051 ml free H2O, 232 g CHO and 18 g Fiber daily.  H2O Flushes 75 ml q 1 hr  Prosource 1 packet TID (3 packets = 120 kcals and 33 g PRO)  Nutrisource Fiber 1 packet TID (9 g Fiber)  Intake:   7 day average intake of TF+Prosource providing (789 ml): 1488 kcals (16 kcals/kg) and 83 g PRO (0.9 g/kg) per dosing weight 92 kg    Patient with poor appetite     NEW FINDINGS   Labs: (8/1): Na 150 mmol/L (H) - 2O flushes increased on (8/1)  Meds: reviewed  Kwasi score: 16  WOC nurse note on (7/31): sacrum (PI); left ear lobe (DTPI)  Wt: 94.4 kg (7/30) wt trended up since admit and now trended down to admit wt 94.2 kg (7/4)  Ileostomy output: 5839-9131 ml daily (Nutrisource Fiber was ordered 7/31)    MALNUTRITION  % Intake: < 75% for > 7 days (non-severe)  % Weight Loss: None noted  Subcutaneous Fat Loss: Facial region and Thoracic/intercostal:  At least Mild - per previous  Muscle Loss: Temporal, Facial & jaw region, Thoracic region (clavicle, acromium bone, deltoid, trapezius, pectoral) and Upper leg (quadricep, hamstring):  Mild to Moderate - per previous  Fluid Accumulation/Edema: Mild: 2+ generalized  Malnutrition Diagnosis: Non-severe malnutrition in the context of acute on chronic illness    Previous Goals   Total avg nutritional  intake to meet a minimum of 25 kcal/kg and 1.5 g PRO/kg daily (per dosing wt 92 kg).  Evaluation: Not met    Previous Nutrition Diagnosis  Inadequate enteral nutrition infusion   Evaluation: Improving    CURRENT NUTRITION DIAGNOSIS  Inadequate enteral nutrition infusion related to TF interuptions as evidenced by 7 day average intake of TF providing 16 kcals/kg and 0.9 g/kg PRO per dosing weight 92 kg       INTERVENTIONS  Implementation  Modified TF order to send Nutrisource Fiber from foodservice    Goals  Total avg nutritional intake to meet a minimum of 25 kcal/kg and 1.5 g PRO/kg daily (per dosing wt 92 kg).    Monitoring/Evaluation  Progress toward goals will be monitored and evaluated per protocol.    Miya Villagran MS/RD/REFUGIO/CNSC  7A RD Pager: [phone]

## 2017-08-01 NOTE — PLAN OF CARE
Problem: Goal Outcome Summary  Goal: Goal Outcome Summary  PT 7A: Pt completed sit to stand with CGA of 1. Pt demonstrates good standing balance. Pt ambulated with FWW, CGA of 1, and IV stand management. Rehab tech present to assist and to follow with w/c as necessary. Pt ambulated 3' forward/backward; he fatigues rapidly with activity. Pt completed seated LE exercises; legs fatigued rapidly during exercise.     Recommend TCU at discharge to progress with strengthening, transfers, and ambulation.

## 2017-08-01 NOTE — PROGRESS NOTES
Social Work:    Attempted to meet with pt today to talk about d/c plan and do psychosocial. Pt only oriented to self. SW left message for pts wife Danica to talk about TCU and do psychosocial. [facility] is aware of pt.    Plan to see pt/wife when wife arrives or talk with her over the phone.    Please contact SW if more immediate needs arise.    JOSEY Chowdhury, [address]   Ph: [phone], Pager: [phone]

## 2017-08-01 NOTE — PROGRESS NOTES
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 Concern for CMV colitis with low count CMV viremia/GI PTLD and subsequently underwent colonoscopy on 7/21, random biopsies obtained.  On 7/25/2017 patient had respiratory distress, abdominal compartment syndrome and EJ with oliguria. Broad spectrum antibiotics were started.  He was intubated and had a paracentesis with 5L removed. He did not required pressors.  CT was obtained which showed a new large heterogeneous right sided retroperitoneal gas and air tracking along duodenum.  No contrast extravasation.  No intraperitoneal air noted. Colorectal surgery was consulted. Initial conservative management with bowel rest and IV abx. Pt continued to spike fevers despite IV abx.  Now s/p Exploratory laparotomy, right angelique-colectomy, end ileostomy, mucus fistula, partial omentectomy on 7/26.    Graft Function: Good function. LFTs WNL. US liver unremarkable.  Immunosuppression Management:   CellCept discontinued (6/27/17) due to neutropenia.   Prograf goal ~8 due to single IS. Level < 3. Increased dose last evening. Repeat level tomorrow.   Cardiorespiratory: Acute respiratory distress with hypercapnia.  Acidotic breathing secondary to sepsis. Improved after control of sepsis. Extubated on Friday. O2 supplement PRN. OOB to chair. Increase incentive spirometry use. Repeat CXR today.   Hematology: Pancytopenia on admission, acute on chronic, secondary to medications (MMF, bactrim, valcyte). Improved with holding medications and neupogen. Started IV Ganciclovir 7/20 for treatment of primary  CMV infection (CMV R-D+).  Continue to hold MMF and bactrim.   -Anemia- Hemoglobin stable. WBC stable. Last blood transfusion on 7/26.   -Cytology ascites fluid, negative for malignancy   GI: Neutropenic colitis. Colonoscopy 7/21. Biopsy results showing resolving injury secondary to possible drug induced vs infectious.   -CT scan abd/pelvis, po contrast, showed a new large heterogeneous right sided retroperitoneal gas and air tracking along duodenum.  No contrast extravasation.  No intraperitoneal air noted. Colorectal surgery consulted.  Continued to spike high grade temperature despite IV antibiotics therefore patient taken to OR. s/p Exploratory laparotomy, right angelique-colectomy, end ileostomy, mucus fistula, partial omentectomy on 7/26. Findings no neida perforation, phlegmon/inflammationright colon. Colon pathology suggested colon perforation, negative for malignancy; CMV stain positive.  WOCN consult for ostomy care.   -Honey thickened liquid diet. TF at goal.   Large output from the iliostomy - Output 3.5L yesterday. Goal ileostomy output ~ 1200 cc in 24 hrs.   loperamide 2mg q6H, increased yesterday. Start fiber TID today. May need to add opium tincture.    Fluid/Electrolytes/Renal: EJ oliguric likely sec to high intraabdominal pressures and ischemic ATN sec to sepsis. Nephrology consulted. No indication for RRT presently. UO good, Creatinine 0.9.  Hypernatremia, free water deficit, high ileostomy output. Ileostomy output now decreased.  Na 150, increase FW 75 cc per hour. Check BMP this evening. K, Mg, Phos stable.   Endocrine: DM type 2. Endocrine consulting for glycemic management.  Infectious disease:   -Severe sepsis, right colon phlegmon. Meropenem/daptomycin/micafungin tx. S/p right angelique-colectomy. Path CMV stain +.  Fluid cx x 2 NGTD. Continue antibiotic x 10 days.   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  CMV discordent with CMV PCR increasing. Continue IV ganciclovir. Follow CMV PCR weekly-  next due 8/3  EBV viremia, repeat EBV PCR in process. Monitor q2 weeks.  -7/25 Ucx, Bcx and ascites cx negative to date  -ID consulting.   Neuro: Toxic metabolic encephalopathy secondary to infection, prolonged hospital stay, significant improvement.  Vascular: Right Arterial line clot 2/2 previous arterial line. Vascular consulted. Recommend ASA only. Some evidence of microvascular injury in digits (toes) this is most likely due to injury while patient was on pressor therapy and sepsis. Now with a third toe injury, vascular consulted again. US completed.  ECHO EF 60-65%. 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   Activity: PT/OT  Prophylaxis: DVT-Heparin SQ  Disposition: 7A. Will transfer to TCU for rehab once status stable. Possible transfer in a few days.       Medical Decision Making: Medium  Admit 76623 (moderate level decision making)    PILLO/Fellow/Resident Provider: Ada Driver PA-C    Faculty: Chaparro Anderson M.D.    __________________________________________________________________  Transplant History:.  3/4/2017 DD OLT with Dr. Tran (Liver), Postoperative day: 150     Interval History:   Overnight events: T max 100.6. Decreased output from ileostomy after adding fiber and increase imodium.   ROS:   A 10-point review of systems was negative except as noted above.    Meds:    insulin isophane human  18 Units Subcutaneous QAM     tacrolimus  4 mg Oral BID IS     insulin aspart  1-12 Units Subcutaneous Q4H     fiber modular  1 packet Per Feeding Tube TID     ganciclovir (CYTOVENE) intermittent infusion  10 mg/kg (Dosing Weight) Intravenous Q12H     loperamide  2 mg Oral or Feeding Tube 4x Daily     insulin aspart   Subcutaneous TID w/meals     insulin glargine  48 Units Subcutaneous Q24H     meropenem  1 g Intravenous Q8H     protein modular  1 packet Per Feeding Tube TID     DAPTOmycin (CUBICIN) intermittent infusion  8 mg/kg (Dosing Weight) Intravenous Q24H      micafungin  100 mg Intravenous Q24H     aspirin  80 mg Oral Daily     multivitamins with minerals  15 mL Per Feeding Tube Daily     heparin  5,000 Units Subcutaneous Q8H     pantoprazole  40 mg Oral or Feeding Tube Daily     sodium chloride (PF)  3 mL Intracatheter Q8H       Physical Exam:     Admit Weight: 94.2 kg (207 lb 11.2 oz) (SCALE 2)    Current vitals:   /89 (BP Location: Right arm)  Pulse 97  Temp 98.5  F (36.9  C) (Oral)  Resp 20  Ht 1.829 m (6')  Wt 91.4 kg (201 lb 6.4 oz)  SpO2 94%  BMI 27.31 kg/m2    Vital sign ranges:    Temp:  [98.1  F (36.7  C)-100.3  F (37.9  C)] 98.5  F (36.9  C)  Pulse:  [97] 97  Heart Rate:  [] 92  Resp:  [20] 20  BP: (134-176)/(84-99) 142/89  SpO2:  [94 %-98 %] 94 %  Patient Vitals for the past 24 hrs:   BP Temp Temp src Pulse Heart Rate Resp SpO2 Weight   08/01/17 1124 142/89 98.5  F (36.9  C) Oral - 92 20 94 % 91.4 kg (201 lb 6.4 oz)   08/01/17 1041 134/84 - - - 97 - 96 % -   08/01/17 0917 - 98.1  F (36.7  C) Oral - - - - -   08/01/17 0748 (!) 157/93 100.3  F (37.9  C) - 97 - - 97 % -   08/01/17 0422 - 98.9  F (37.2  C) Oral - - - - -   08/01/17 0342 (!) 164/91 99.6  F (37.6  C) Oral 97 - 20 96 % -   07/31/17 2333 (!) 151/96 98.3  F (36.8  C) Oral 97 - 20 96 % -   07/31/17 1901 (!) 176/97 99  F (37.2  C) Oral - 102 20 96 % -   07/31/17 1620 (!) 152/99 98.8  F (37.1  C) Oral - 101 20 98 % -     General Appearance: NAD  Skin: normal, warm, dry  Heart: RRR  Lungs: B/l decreased bases, NLB on RA. +Productive cough  Abdomen: soft, appropriately ttp. Ileostomy with brown output. Mucus fistula, pink. Midline incision, c/d/i. Chevron incision well healed.   : No vazquez.   Extremities: No edema. Ext NT x 4.  Neurologic: A&O       Data:   CMP    Recent Labs  Lab 08/01/17  0651 07/31/17  1806  07/27/17  0410  07/26/17  2133 07/26/17  2046  07/25/17  1651   * 147*  < > 142  < > 139 139  < > 138   POTASSIUM 4.3 4.2  < > 3.5  < > 3.5 3.5  < > 4.4   CHLORIDE 123*  121*  < > 108  < >  --   --   < > 106   CO2 20 19*  < > 20  < >  --   --   < > 24   * 210*  < > 164*  < > 155* 151*  < > 176*   BUN 51* 54*  < > 72*  < >  --   --   < > 79*   CR 0.90 0.95  < > 2.82*  < >  --   --   < > 2.90*   GFRESTIMATED 89 83  < > 24*  < >  --   --   < > 23*   GFRESTBLACK >90African American GFR Calc >90African American GFR Calc  < > 29*  < >  --   --   < > 28*   JACOB 8.1* 8.0*  < > 7.9*  < >  --   --   < > 8.0*   ICAW  --   --   --   --   --  4.4 4.3*  < >  --    MAG 1.9 2.0  < > 2.0  --   --   --   < > 1.9   PHOS 3.1 2.7  < > 5.8*  --   --   --   < > 4.8*   ALBUMIN  --   --   --  2.4*  --   --   --   --  2.0*   BILITOTAL  --   --   --  1.3  --   --   --   --  0.6   ALKPHOS  --   --   --  143  --   --   --   --  242*   AST  --   --   --  27  --   --   --   --  61*   ALT  --   --   --  27  --   --   --   --  50   < > = values in this interval not displayed.  CBC    Recent Labs  Lab 08/01/17  0651 07/31/17  0546   HGB 7.9* 7.7*   WBC 4.7 4.9   PLT 99* 105*     COAGS    Recent Labs  Lab 07/26/17  2243 07/26/17 2017 07/26/17  1620   INR 1.31* 1.31* 1.30*   PTT  --  39* 44*      Urinalysis  Recent Labs   Lab Test  07/28/17   1042  07/25/17   1205   06/14/17   1508   04/11/16   1345   COLOR  Yellow  Dark Yellow   < >   --    < >  Yellow   APPEARANCE  Slightly Cloudy  Cloudy   < >   --    < >  Clear   URINEGLC  Negative  70*   < >   --    < >  30*   URINEBILI  Negative  Negative   < >   --    < >  Negative   URINEKETONE  Negative  Negative   < >   --    < >  Negative   SG  1.012  1.014   < >   --    < >  1.016   UBLD  Trace*  Moderate*   < >   --    < >  Small*   URINEPH  5.0  5.0   < >   --    < >  5.0   PROTEIN  30*  100*   < >   --    < >  30*   NITRITE  Negative  Negative   < >   --    < >  Negative   LEUKEST  Negative  Trace*   < >   --    < >  Negative   RBCU  5*  >182*   < >   --    < >  1   WBCU  6*  5*   < >   --    < >  1   UTPG   --    --    --   1.55*   --   0.41*    < > = values in  "this interval not displayed.          7/21/2017   SPECIMEN(S):   A: Colon biopsy, ascending   B: Colon biopsy, descending     FINAL DIAGNOSIS:   A. COLON BIOPSY, ASCENDING:   - Colonic mucosa with no significant histologic abnormality   - Negative for intraepithelial lymphocytosis or deposition of   subepithelial thick collagenous band     B. COLON BIOPSY, DESCENDING:   - Crypt architecture distortion, consistent with healed prior injury   - Negative for active inflammation or dysplasia   - Negative for intraepithelial lymphocytosis or deposition of   subepithelial thick collagenous band   - Please, see comment     COMMENT:   Specimen B, \"descending colon\" features lamina propria edema, slight   variation in crypt sizes and shapes and occasional crypt drop-out.  This   may indicate a resolving injury which could be drug-induced or   infectious.     CT scan 7/25/2017:   IMPRESSION:   1. New large heterogeneous area of right-sided retroperitoneal gas  which is highly concerning for an infectious process. Given the  history of prior neutropenic colitis and biopsies, there could also be  a component of stool from a ruptured viscus, despite the lack of  surrounding bowel loops and no extraluminal oral contrast. Surgical  consultation is recommended.      2. Bibasilar atelectasis versus consolidation with small bilateral  pleural effusions.     3. Extensive mesenteric and soft tissue edema with large volume  ascites. Numerous mesenteric and retroperitoneal lymph nodes which are  presumably reactive.     4. Postsurgical changes of liver transplant.     [Urgent Result: Retroperitoneal gas]     Finding was identified on 7/25/2017 5:34 PM.      Dr. Santoyo was contacted by Dr. Atkins at 7/25/2017 5:37 PM and  verbalized understanding of the urgent finding.      I have personally reviewed the examination and initial interpretation  and I agree with the findings.     LUCI HOROWITZ MD    Attestation:    The patient has been " seen and evaluated by me.   Vital signs, labs, medications and orders were reviewed.   When obtained, diagnostic images were reviewed by me and interpreted as above.    The care plan was discussed with the multidisciplinary team and I agree with the findings and plan in this note, with any differences recorded in blue.    Immunosuppressive medication management was reviewed and adjusted as reflected in the note and orders.     .

## 2017-08-01 NOTE — PROGRESS NOTES
Social Work: Assessment with Discharge Plan    Patient Name:  Camacho Bhagat  :  1965  Age:  51 year old  MRN:  [id number]  Risk/Complexity Score:  Filed Complexity Screen Score: 10  Completed assessment with:  Pt and wife    Presenting Information   Reason for Referral:  Discharge plan  Date of Intake:  2017  Referral Source:  Chart Review  Decision Maker:  Pt is his own decision maker  Alternate Decision Maker:  Wife  Health Care Directive: No HCD  Living Situation: Pt lives in a home with his wife. Per wife, pt has been staying with his mother in [place], [place] because she doesn't have any stairs going up into the home. Wife states she also cares for her mother and stays there sometimes.  Previous Functional Status:  Independent  Patient and family understanding of hospitalization:  Pt and family understand why he is hospitalized  Cultural/Language/Spiritual Considerations:  None  Adjustment to Illness:  Pt and wife adjusting to illness/transplant, no concerns. They are making adequate changes to living situation to accommodate pt.     Physical Health  Reason for Admission:    1. Neutropenic colitis (H)    2. Liver transplant recipient (H)      Services Needed/Recommended:  TCU    Mental Health/Chemical Dependency  Diagnosis:  None  Support/Services in Place:  None  Services Needed/Recommended:  None    Support System  Significant relationship at present time:  Wife (Danica)  Family of origin is available for support:  Yes  Other support available:  Pts daughter and mother are also available for support.  Gaps in support system:  None identified  Patient is caregiver to:  None. Pts wife does take care of her mother.    Provider Information   Primary Care Physician:  Shay Kirkpatrick   [phone]   Clinic:   [facility] [address] [place] [address] 741 / [facility] [address]      :  None    Financial   Income Source:  Pt does not work and hasnt for some time. He is no on SSDI. Pts wife works full  time at [employer] and financially supports the pt.  Financial Concerns:  None identified. Pts wife states they have good insurance and the transplant was cheaper than she anticipated.  Insurance:    Payor/Plan Subscriber Name Rel Member # Group #   BCBS - BCBS OUT OF [employer]  [id number]       [id number]       Discharge Plan   Patient and family discharge goal:  TCU  Provided education on discharge plan:  YES  Patient agreeable to discharge plan:  YES  A list of Medicare Certified Facilities was provided to the patient and/or family to encourage patient choice. Patient's choices for facility are:  [facility] or TCU in [facility]. SW to look into options.  Will NH provide Skilled rehabilitation or complex medical:  YES  General information regarding anticipated insurance coverage and possible out of pocket cost was discussed. Patient and patient's family are aware patient may incur the cost of transportation to the facility, pending insurance payment: YES  Barriers to discharge:  Medical stability    Discharge Recommendations   Anticipated Disposition:  Facility:  [facility] or [facility]CU [facility]  Transportation Needs: None identified    Additional comments   Pt is on list for [facility]. SW will also look into options in [facility] for TCU options. Pts wife agreeable to plan. Pt did not participate in conversation. Pt is only oriented to self and was sleeping during conversation.    Please contact SW with needs and concerns.    JOSEY Chowdhury, LGSW  7A   Ph: [phone], Pager: [phone]

## 2017-08-01 NOTE — PROGRESS NOTES
"Colorectal Surgery Progress Note      Subjective:  Awake and alert.  Doing ok.  Denies pain this AM.  Reports that ostomy functioning.     Vitals:  Vitals:    07/31/17 2333 08/01/17 0342 08/01/17 0422 08/01/17 0748   BP: (!) 151/96 (!) 164/91  (!) 157/93   BP Location: Right arm Right arm     Pulse: 97 97  97   Resp: 20 20     Temp: 98.3  F (36.8  C) 99.6  F (37.6  C) 98.9  F (37.2  C) 100.3  F (37.9  C)   TempSrc: Oral Oral Oral    SpO2: 96% 96%  97%   Weight:       Height:         I/O:  I/O last 3 completed shifts:  In: 2920 [I.V.:40; NG/GT:1440]  Out: 1985 [Drains:385; Stool:1600]    Physical Exam:  Gen: Awake, alert, oriented to self.   Pulm: Norm resp effort  Abd: Soft, mildly distended but much less compared to prior   Incision  With staples and c/d/i   Ileostomy - pink, viable, red rubber via stoma, +succus.    Mucus fistula and incision dressed   TABITHA drain serosang output  Ext:      BMP    Recent Labs  Lab 08/01/17  0651 07/31/17  1806 07/31/17  0546 07/30/17  1739   * 147* 147* 148*   POTASSIUM 4.3 4.2 4.1 4.1   CHLORIDE 123* 121* 121* 121*   CO2 20 19* 19* 19*   BUN 51* 54* 56* 55*   CR 0.90 0.95 1.05 1.06   * 210* 101* 160*   MAG 1.9 2.0 2.1 2.1   PHOS 3.1 2.7 2.9 2.3*     CBC    Recent Labs  Lab 08/01/17  0651 07/31/17  0546 07/30/17  0605 07/29/17  0336   WBC 4.7 4.9 5.2 6.3   HGB 7.9* 7.7* 8.2* 8.1*   HCT 24.8* 24.6* 25.8* 24.8*   PLT 99* 105* 125* 121*         ASSESSMENT: This is a 51 year old male with complex PMH of CASTAÑEDA cirrhosis s/p Liver transplant Mar 2017.  Was admitted on 7/4/2014 with abdominal pain, sepsis, CT at OSH showed \"right colonic wall thickening suggesting colitis\" underwent Ex-Lap and washout for neutropenic colitis, intra-op was noted to have inflammed cecum and ascending colon with murky fluid.  Abdomen was left open at the time and was closed on 7/5/2017.  Some concern for CMV colitis with low count CMV viremia/GI PTLD and subsequently underwent colonoscopy on 7/21:  " No colitis was seen on visualized portions of mucosa.  Exam sub-optimal as colon filled with solid stool.  Random biopsies obtained.  On 7/25/2017 pt became more tachycardic, increasing O2 needs and altered, pt was intubated, hypotension responsive to IVFs, worsening kidney function.  Has not required pressors.  CT was obtained which showed a new large heterogeneous right sided retroperitoneal gas and air tracking along duodenum.  No contrast extravasation.  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  Despite conservative management with bowel rest and IV abx, pt continued to spike fevers.  Now s/p Exploratory laparotomy, right angelique-colectomy, end ileostomy, mucus fistula, partial omentectomy on 7/26.  No perforation noted, inflammation of right colon.     - no new recs today  - defer management to transplant  - can remove TABITHA drain from colorectal perspective  - intra-op peritoneal fluid cultures - NGTD.  Path pending.   - WOCN for new ileostomy & mucus fistula  - now with ileostomy, do not recommend bowel regimens (miralax, senna-docusate, dulcolax, etc).    - For high ileostomy outputs, already receiving fiber.  Can slowly titrate imodium to max of 4mg QID.  If continue to have high outputs despite fiber and imodium, can start lomotil 1 tab BID-TID and titrate to max of 2 tabs QID.  After can consider cholestyramine and or opium tincture.  Typically recommend ileostomy output goal of about 1200mL in 24 hours +/- 200mL depending on oral intake.  Defer fluid status to transplant and nephrology.   - defer diet adv to SLP.  But once cleared for solid foods, recommend a low residue diet.   - OK for DVT ppx from colorectal perspective    Iqra Acosta PA-C   Colon and Rectal Surgery  [id number]     Patient was seen and discussed with Fellow, Dr. Lynch      Attestation:  This patient has been seen and evaluated by me, Sara Dinh.  Discussed with the house staff team or resident(s) and  agree with the findings and plan in this note.  Sara Dinh MD  Colon and Rectal Surgery Attending  [phone]

## 2017-08-01 NOTE — PLAN OF CARE
Problem: Goal Outcome Summary  Goal: Goal Outcome Summary  Outcome: No Change  Pt alert and oriented to person; he intermittently follows commands and is more agitated today; bed alarm on for safety.  Tmax 100.3; other VSS on RA.  Mg 1.9; 2g replaced.  -179; novolog administered per ss.  Sodium 148 this evening (down from 150); water flushes increased to 75mL q1h.  Pt c/o back pain; prn tylenol and oxycodone administered with some relief.   Denies nausea.  Continuous TF at goal 60mL/hr.  Voiding good amounts but frequently incontinent; PVR 45mL.  Ileostomy output decreasing with fiber and imodium; 400mL out this shift.  TABITHA with 150mL serosanguinous output.  Incision stapled and open to air; mucus fistula covered.  Coccyx wound covered with Mepilex.  Pt ambulated a few feet with Ax2 and walker; requiring mechanical lift as he is not following commands.  Continue to monitor and notify MD with changes.

## 2017-08-01 NOTE — PROGRESS NOTES
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  Now s/p ex lap with R hemicolectomy, end ileostomy, mucus fistula, partial omenectomy on 7/26.    Fair glucose control    Plan:  -increase NPH to 18 units qpm  -continue Glargine 48 units q24 h PM  -meal aspart 1unit/7g CHO ordered for meals and snacks  -aspart high correction q4h        Outpatient diabetes follow up: Dr. Eckert at [facility]             Interval History:   The last 24 hours progress and nursing notes reviewed.  Off insulin infusion yesterday afternoon with NPH 15 units added to glargine 48 units already on board.  No Po intake recorded.  Pt declined offer of PO today.  And given confusion, RN not pushing PO.  No issues with continuous Tf infusion (nepro at 60)      Recent Labs  Lab 08/01/17  1127 08/01/17  0651 08/01/17  0513 08/01/17  0113 07/31/17  2119 07/31/17  1904 07/31/17  1806 07/31/17  1714  07/31/17  0546  07/30/17  1739  07/30/17  0605  07/29/17  1715   GLC  --  141*  --   --   --   --  210*  --   --  101*  --  160*  --  155*  --  151*   *  --  123* 150* 182* 195*  --  186*  < >  --   < >  --   < >  --   < >  --    < > = values in this interval not displayed.            Review of Systems:   See interval hx          Medications:   PTA taking Januvia and glargine versus glargine and aspart per carb    Active Diet Order      Full Liquid Diet Nectar Thickened Liquids (pre-thickened or use instant food thickener)     Physical Exam:  Gen:  resting in bed, in NAD.    HEENT: NC/AT,  NJ feeding tube  Resp: unlabored at rest  Neuro: off and on awake, talking out of context  /89 (BP  Location: Right arm)  Pulse 97  Temp 98.5  F (36.9  C) (Oral)  Resp 20  Ht 1.829 m (6')  Wt 94.4 kg (208 lb 1.6 oz)  SpO2 94%  BMI 28.22 kg/m2           Data:     Lab Results   Component Value Date    A1C  07/06/2017     Canceled, Test credited   Below Assay Range  NOTIFIED LEONARD ONEILL AT 0538 ON 7/6/17 BY EAANTOLIN      A1C 9.4 02/16/2017    A1C 6.2 12/14/2016    A1C 6.1 10/27/2016    A1C 8.3 07/02/2012            Recent Labs   Lab Test  08/01/17   0651  07/31/17   1806   NA  150*  147*   POTASSIUM  4.3  4.2   CHLORIDE  123*  121*   CO2  20  19*   ANIONGAP  7  7   GLC  141*  210*   BUN  51*  54*   CR  0.90  0.95   JACOB  8.1*  8.0*     CBC RESULTS:   Recent Labs   Lab Test  08/01/17   0651   WBC  4.7   RBC  2.77*   HGB  7.9*   HCT  24.8*   MCV  90   MCH  28.5   MCHC  31.9   RDW  16.6*   PLT  99*     Janicehomer Guzman APRN -[id number]    Diabetes Management job code [id number]

## 2017-08-01 NOTE — PLAN OF CARE
Problem: Goal Outcome Summary  Goal: Goal Outcome Summary  Outcome: No Change  Alert, but disoriented x4. Up with mechanical lift. Hypertensive and tmax 99.6. Temperature decreased to 98.9 without intervention. OVSS on RA. Thickened liquid diet +TF @ 60mL/hr through NJ. Denies pain and nausea. Ileostomy with 650 mL output. L TABITHA with 175 mL output. BGs 150, 123; SSI administered per orders. Pt continues to be incontinent of urine. Will continue with plan of care.

## 2017-08-01 NOTE — PLAN OF CARE
Problem: Goal Outcome Summary  Goal: Goal Outcome Summary  Outcome: No Change  5491-8717: AVSS and Temp max of 99. Tylenol given. Pt denied any nausea, but complained of pain and received oxy 5mg for pain.  and 182. Disoriented to place, situation, and time. Pt is on thicken necator liquids and has had a poor appetite. IJ triple lumen with one lumen infusing with a TKO and the others SL. Pt has an ileostomy bag with a drain attached due to liquid stools. NJ with continuous  Nepro tube feeds running at 60ml/hr with 60ml flushes every hour. Pt is an assist of 2 and is on a bed alarm. Will continue to monitor and follow plan of care.

## 2017-08-02 ENCOUNTER — APPOINTMENT (OUTPATIENT)
Dept: GENERAL RADIOLOGY | Facility: CLINIC | Age: 52
End: 2017-08-02
Attending: PHYSICIAN ASSISTANT
Payer: COMMERCIAL

## 2017-08-02 ENCOUNTER — APPOINTMENT (OUTPATIENT)
Dept: SPEECH THERAPY | Facility: CLINIC | Age: 52
End: 2017-08-02
Attending: TRANSPLANT SURGERY
Payer: COMMERCIAL

## 2017-08-02 ENCOUNTER — APPOINTMENT (OUTPATIENT)
Dept: PHYSICAL THERAPY | Facility: CLINIC | Age: 52
End: 2017-08-02
Attending: TRANSPLANT SURGERY
Payer: COMMERCIAL

## 2017-08-02 LAB
ALBUMIN SERPL-MCNC: 2.3 G/DL (ref 3.4–5)
ALP SERPL-CCNC: 199 U/L (ref 40–150)
ALT SERPL W P-5'-P-CCNC: 42 U/L (ref 0–70)
ANION GAP SERPL CALCULATED.3IONS-SCNC: 6 MMOL/L (ref 3–14)
ANION GAP SERPL CALCULATED.3IONS-SCNC: 6 MMOL/L (ref 3–14)
AST SERPL W P-5'-P-CCNC: 62 U/L (ref 0–45)
BACTERIA SPEC CULT: NORMAL
BASE DEFICIT BLDA-SCNC: 6.8 MMOL/L
BASOPHILS # BLD AUTO: 0 10E9/L (ref 0–0.2)
BASOPHILS NFR BLD AUTO: 0.4 %
BILIRUB DIRECT SERPL-MCNC: 0.1 MG/DL (ref 0–0.2)
BILIRUB SERPL-MCNC: 0.4 MG/DL (ref 0.2–1.3)
BUN SERPL-MCNC: 47 MG/DL (ref 7–30)
BUN SERPL-MCNC: 49 MG/DL (ref 7–30)
CALCIUM SERPL-MCNC: 7.8 MG/DL (ref 8.5–10.1)
CALCIUM SERPL-MCNC: 8 MG/DL (ref 8.5–10.1)
CHLORIDE SERPL-SCNC: 122 MMOL/L (ref 94–109)
CHLORIDE SERPL-SCNC: 123 MMOL/L (ref 94–109)
CO2 SERPL-SCNC: 19 MMOL/L (ref 20–32)
CO2 SERPL-SCNC: 21 MMOL/L (ref 20–32)
CREAT SERPL-MCNC: 0.9 MG/DL (ref 0.66–1.25)
CREAT SERPL-MCNC: 0.91 MG/DL (ref 0.66–1.25)
DIFFERENTIAL METHOD BLD: ABNORMAL
EBV DNA # SPEC NAA+PROBE: 6864 {COPIES}/ML
EBV DNA SPEC NAA+PROBE-LOG#: 3.8 {LOG_COPIES}/ML
EOSINOPHIL # BLD AUTO: 0.1 10E9/L (ref 0–0.7)
EOSINOPHIL NFR BLD AUTO: 1.4 %
ERYTHROCYTE [DISTWIDTH] IN BLOOD BY AUTOMATED COUNT: 16.7 % (ref 10–15)
FUNGUS SPEC CULT: NORMAL
GFR SERPL CREATININE-BSD FRML MDRD: 87 ML/MIN/1.7M2
GFR SERPL CREATININE-BSD FRML MDRD: 88 ML/MIN/1.7M2
GLUCOSE BLDC GLUCOMTR-MCNC: 104 MG/DL (ref 70–99)
GLUCOSE BLDC GLUCOMTR-MCNC: 115 MG/DL (ref 70–99)
GLUCOSE BLDC GLUCOMTR-MCNC: 160 MG/DL (ref 70–99)
GLUCOSE BLDC GLUCOMTR-MCNC: 164 MG/DL (ref 70–99)
GLUCOSE BLDC GLUCOMTR-MCNC: 194 MG/DL (ref 70–99)
GLUCOSE BLDC GLUCOMTR-MCNC: 196 MG/DL (ref 70–99)
GLUCOSE BLDC GLUCOMTR-MCNC: 97 MG/DL (ref 70–99)
GLUCOSE SERPL-MCNC: 112 MG/DL (ref 70–99)
GLUCOSE SERPL-MCNC: 166 MG/DL (ref 70–99)
HCO3 BLD-SCNC: 18 MMOL/L (ref 21–28)
HCT VFR BLD AUTO: 24.4 % (ref 40–53)
HGB BLD-MCNC: 7.6 G/DL (ref 13.3–17.7)
IMM GRANULOCYTES # BLD: 0 10E9/L (ref 0–0.4)
IMM GRANULOCYTES NFR BLD: 0.4 %
LYMPHOCYTES # BLD AUTO: 2 10E9/L (ref 0.8–5.3)
LYMPHOCYTES NFR BLD AUTO: 39.2 %
Lab: NORMAL
MAGNESIUM SERPL-MCNC: 2 MG/DL (ref 1.6–2.3)
MCH RBC QN AUTO: 28 PG (ref 26.5–33)
MCHC RBC AUTO-ENTMCNC: 31.1 G/DL (ref 31.5–36.5)
MCV RBC AUTO: 90 FL (ref 78–100)
MICRO REPORT STATUS: NORMAL
MICRO REPORT STATUS: NORMAL
MONOCYTES # BLD AUTO: 0.2 10E9/L (ref 0–1.3)
MONOCYTES NFR BLD AUTO: 4.6 %
NEUTROPHILS # BLD AUTO: 2.7 10E9/L (ref 1.6–8.3)
NEUTROPHILS NFR BLD AUTO: 54 %
NRBC # BLD AUTO: 0 10*3/UL
NRBC BLD AUTO-RTO: 0 /100
O2/TOTAL GAS SETTING VFR VENT: 21 %
PCO2 BLD: 31 MM HG (ref 35–45)
PH BLD: 7.37 PH (ref 7.35–7.45)
PHOSPHATE SERPL-MCNC: 3.2 MG/DL (ref 2.5–4.5)
PLATELET # BLD AUTO: 90 10E9/L (ref 150–450)
PO2 BLD: 69 MM HG (ref 80–105)
POTASSIUM SERPL-SCNC: 4.5 MMOL/L (ref 3.4–5.3)
POTASSIUM SERPL-SCNC: 4.8 MMOL/L (ref 3.4–5.3)
PROCALCITONIN SERPL-MCNC: 0.78 NG/ML
PROT SERPL-MCNC: 6.1 G/DL (ref 6.8–8.8)
RBC # BLD AUTO: 2.71 10E12/L (ref 4.4–5.9)
SODIUM SERPL-SCNC: 147 MMOL/L (ref 133–144)
SODIUM SERPL-SCNC: 150 MMOL/L (ref 133–144)
SPECIMEN SOURCE: NORMAL
SPECIMEN SOURCE: NORMAL
TACROLIMUS BLD-MCNC: ABNORMAL UG/L (ref 5–15)
TME LAST DOSE: ABNORMAL H
WBC # BLD AUTO: 5 10E9/L (ref 4–11)

## 2017-08-02 PROCEDURE — 25000132 ZZH RX MED GY IP 250 OP 250 PS 637: Performed by: PHYSICIAN ASSISTANT

## 2017-08-02 PROCEDURE — 71020 XR CHEST 2 VW: CPT

## 2017-08-02 PROCEDURE — 27210432 ZZH NUTRITION PRODUCT RENAL BASIC LITER

## 2017-08-02 PROCEDURE — 85025 COMPLETE CBC W/AUTO DIFF WBC: CPT | Performed by: TRANSPLANT SURGERY

## 2017-08-02 PROCEDURE — 25000132 ZZH RX MED GY IP 250 OP 250 PS 637: Performed by: TRANSPLANT SURGERY

## 2017-08-02 PROCEDURE — 97530 THERAPEUTIC ACTIVITIES: CPT | Mod: GP

## 2017-08-02 PROCEDURE — 36592 COLLECT BLOOD FROM PICC: CPT | Performed by: TRANSPLANT SURGERY

## 2017-08-02 PROCEDURE — 25000132 ZZH RX MED GY IP 250 OP 250 PS 637: Performed by: STUDENT IN AN ORGANIZED HEALTH CARE EDUCATION/TRAINING PROGRAM

## 2017-08-02 PROCEDURE — 40000225 ZZH STATISTIC SLP WARD VISIT

## 2017-08-02 PROCEDURE — 25000128 H RX IP 250 OP 636: Performed by: PHYSICIAN ASSISTANT

## 2017-08-02 PROCEDURE — 80076 HEPATIC FUNCTION PANEL: CPT | Performed by: TRANSPLANT SURGERY

## 2017-08-02 PROCEDURE — 83735 ASSAY OF MAGNESIUM: CPT | Performed by: TRANSPLANT SURGERY

## 2017-08-02 PROCEDURE — S0171 BUMETANIDE 0.5 MG: HCPCS | Performed by: PHYSICIAN ASSISTANT

## 2017-08-02 PROCEDURE — 25000131 ZZH RX MED GY IP 250 OP 636 PS 637: Performed by: PHYSICIAN ASSISTANT

## 2017-08-02 PROCEDURE — 82803 BLOOD GASES ANY COMBINATION: CPT | Performed by: PHYSICIAN ASSISTANT

## 2017-08-02 PROCEDURE — 87040 BLOOD CULTURE FOR BACTERIA: CPT | Performed by: PHYSICIAN ASSISTANT

## 2017-08-02 PROCEDURE — 40000193 ZZH STATISTIC PT WARD VISIT

## 2017-08-02 PROCEDURE — 97535 SELF CARE MNGMENT TRAINING: CPT | Mod: GO

## 2017-08-02 PROCEDURE — 25000128 H RX IP 250 OP 636: Performed by: TRANSPLANT SURGERY

## 2017-08-02 PROCEDURE — 00000146 ZZHCL STATISTIC GLUCOSE BY METER IP

## 2017-08-02 PROCEDURE — 40000133 ZZH STATISTIC OT WARD VISIT

## 2017-08-02 PROCEDURE — 25000125 ZZHC RX 250: Performed by: PHYSICIAN ASSISTANT

## 2017-08-02 PROCEDURE — 92526 ORAL FUNCTION THERAPY: CPT | Mod: GN

## 2017-08-02 PROCEDURE — 36415 COLL VENOUS BLD VENIPUNCTURE: CPT | Performed by: PHYSICIAN ASSISTANT

## 2017-08-02 PROCEDURE — 12000025 ZZH R&B TRANSPLANT INTERMEDIATE

## 2017-08-02 PROCEDURE — 27210913 ZZH NUTRITION PRODUCT INTERMEDIATE PACKET

## 2017-08-02 PROCEDURE — 25000128 H RX IP 250 OP 636: Performed by: INTERNAL MEDICINE

## 2017-08-02 PROCEDURE — 36600 WITHDRAWAL OF ARTERIAL BLOOD: CPT

## 2017-08-02 PROCEDURE — 25000125 ZZHC RX 250: Performed by: INTERNAL MEDICINE

## 2017-08-02 PROCEDURE — 84100 ASSAY OF PHOSPHORUS: CPT | Performed by: TRANSPLANT SURGERY

## 2017-08-02 PROCEDURE — 25000128 H RX IP 250 OP 636: Performed by: STUDENT IN AN ORGANIZED HEALTH CARE EDUCATION/TRAINING PROGRAM

## 2017-08-02 PROCEDURE — 80048 BASIC METABOLIC PNL TOTAL CA: CPT | Performed by: TRANSPLANT SURGERY

## 2017-08-02 PROCEDURE — 80197 ASSAY OF TACROLIMUS: CPT | Performed by: PHYSICIAN ASSISTANT

## 2017-08-02 PROCEDURE — 84145 PROCALCITONIN (PCT): CPT | Performed by: PHYSICIAN ASSISTANT

## 2017-08-02 PROCEDURE — 25000128 H RX IP 250 OP 636

## 2017-08-02 PROCEDURE — 25000132 ZZH RX MED GY IP 250 OP 250 PS 637: Performed by: SURGERY

## 2017-08-02 RX ORDER — BUMETANIDE 0.25 MG/ML
1 INJECTION INTRAMUSCULAR; INTRAVENOUS ONCE
Status: COMPLETED | OUTPATIENT
Start: 2017-08-02 | End: 2017-08-02

## 2017-08-02 RX ORDER — DEXTROSE MONOHYDRATE 50 MG/ML
INJECTION, SOLUTION INTRAVENOUS
Status: COMPLETED
Start: 2017-08-02 | End: 2017-08-02

## 2017-08-02 RX ORDER — DIPHENOXYLATE HCL/ATROPINE 2.5-.025/5
2.5 LIQUID (ML) ORAL 2 TIMES DAILY
Status: DISCONTINUED | OUTPATIENT
Start: 2017-08-02 | End: 2017-08-05

## 2017-08-02 RX ADMIN — INSULIN ASPART 1 UNITS: 100 INJECTION, SOLUTION INTRAVENOUS; SUBCUTANEOUS at 19:50

## 2017-08-02 RX ADMIN — HEPARIN SODIUM 5000 UNITS: 5000 INJECTION, SOLUTION INTRAVENOUS; SUBCUTANEOUS at 16:53

## 2017-08-02 RX ADMIN — ACETAMINOPHEN 650 MG: 325 SOLUTION ORAL at 22:18

## 2017-08-02 RX ADMIN — DEXTROSE MONOHYDRATE 1000 ML: 50 INJECTION, SOLUTION INTRAVENOUS at 08:36

## 2017-08-02 RX ADMIN — HEPARIN SODIUM 5000 UNITS: 5000 INJECTION, SOLUTION INTRAVENOUS; SUBCUTANEOUS at 01:06

## 2017-08-02 RX ADMIN — Medication 2.5 ML: at 19:31

## 2017-08-02 RX ADMIN — HEPARIN SODIUM 5000 UNITS: 5000 INJECTION, SOLUTION INTRAVENOUS; SUBCUTANEOUS at 07:50

## 2017-08-02 RX ADMIN — GANCICLOVIR SODIUM 950 MG: 500 INJECTION, POWDER, LYOPHILIZED, FOR SOLUTION INTRAVENOUS at 08:35

## 2017-08-02 RX ADMIN — BUMETANIDE 1 MG: 0.25 INJECTION INTRAMUSCULAR; INTRAVENOUS at 19:31

## 2017-08-02 RX ADMIN — LOPERAMIDE HYDROCHLORIDE 2 MG: 1 SOLUTION ORAL at 16:53

## 2017-08-02 RX ADMIN — TACROLIMUS 4 MG: 5 CAPSULE ORAL at 07:50

## 2017-08-02 RX ADMIN — Medication 1 PACKET: at 19:32

## 2017-08-02 RX ADMIN — MULTIVIT AND MINERALS-FERROUS GLUCONATE 9 MG IRON/15 ML ORAL LIQUID 15 ML: at 07:50

## 2017-08-02 RX ADMIN — Medication 2.5 ML: at 12:47

## 2017-08-02 RX ADMIN — Medication 1 PACKET: at 07:51

## 2017-08-02 RX ADMIN — Medication 1 PACKET: at 13:58

## 2017-08-02 RX ADMIN — MEROPENEM 1 G: 1 INJECTION, POWDER, FOR SOLUTION INTRAVENOUS at 13:57

## 2017-08-02 RX ADMIN — OXYCODONE HYDROCHLORIDE 5 MG: 5 SOLUTION ORAL at 01:06

## 2017-08-02 RX ADMIN — Medication 80 MG: at 07:50

## 2017-08-02 RX ADMIN — OXYCODONE HYDROCHLORIDE 10 MG: 5 SOLUTION ORAL at 14:09

## 2017-08-02 RX ADMIN — LOPERAMIDE HYDROCHLORIDE 2 MG: 1 SOLUTION ORAL at 07:50

## 2017-08-02 RX ADMIN — LOPERAMIDE HYDROCHLORIDE 2 MG: 1 SOLUTION ORAL at 19:31

## 2017-08-02 RX ADMIN — MICAFUNGIN SODIUM 100 MG: 10 INJECTION, POWDER, LYOPHILIZED, FOR SOLUTION INTRAVENOUS at 12:37

## 2017-08-02 RX ADMIN — MEROPENEM 1 G: 1 INJECTION, POWDER, FOR SOLUTION INTRAVENOUS at 22:18

## 2017-08-02 RX ADMIN — GANCICLOVIR SODIUM 950 MG: 500 INJECTION, POWDER, LYOPHILIZED, FOR SOLUTION INTRAVENOUS at 19:31

## 2017-08-02 RX ADMIN — INSULIN ASPART 4 UNITS: 100 INJECTION, SOLUTION INTRAVENOUS; SUBCUTANEOUS at 08:47

## 2017-08-02 RX ADMIN — LOPERAMIDE HYDROCHLORIDE 2 MG: 1 SOLUTION ORAL at 12:36

## 2017-08-02 RX ADMIN — PANTOPRAZOLE SODIUM 40 MG: 40 TABLET, DELAYED RELEASE ORAL at 07:50

## 2017-08-02 RX ADMIN — MEROPENEM 1 G: 1 INJECTION, POWDER, FOR SOLUTION INTRAVENOUS at 04:49

## 2017-08-02 RX ADMIN — TACROLIMUS 5 MG: 5 CAPSULE ORAL at 19:31

## 2017-08-02 RX ADMIN — HEPARIN SODIUM 5000 UNITS: 5000 INJECTION, SOLUTION INTRAVENOUS; SUBCUTANEOUS at 23:40

## 2017-08-02 RX ADMIN — DAPTOMYCIN 750 MG: 500 INJECTION, POWDER, LYOPHILIZED, FOR SOLUTION INTRAVENOUS at 16:54

## 2017-08-02 ASSESSMENT — PAIN DESCRIPTION - DESCRIPTORS: DESCRIPTORS: DISCOMFORT

## 2017-08-02 NOTE — PLAN OF CARE
Problem: Goal Outcome Summary  Goal: Goal Outcome Summary  Outcome: No Change  Pt slightly hypertensive today 150s-160s/90s; OVSS on room air. Pt c/o shortness of breath/ dyspnea with exertion and team notified. RR in the low to mid 20s. Breathing improves with HOB elevated and seems cause trouble for pt during medication administration (team notified on rounds). Chest xray ordered, ABG drawn, and blood cultures drawn. Oxy x 1 for generalized pain.   and 194, treated per MAR. Scheduled lomotil added today and given x 1. Pt voiding and having large output in ileostomy. Pt independently positioning to an extent in bed and assisted when needed; position changed minimum of q 2hr. NJT with tube feeds at goal of 60 ml/hr. TABITHA was removed shortly before 0700 and dressing on site changed x 1. IJ with TKO and IV meds, otherwise saline locked. Full liquid diet with good appetite and thickened liquids. All care explained and questions answered, will continue to monitor and will notify team of changes.

## 2017-08-02 NOTE — PLAN OF CARE
Problem: Goal Outcome Summary  Goal: Goal Outcome Summary  ST 7A: Recommend continue nectar-thick full liquid diet as tolerated. Pt to sit upright for all PO intake, take small bites/sips and alternate consistencies. Pt with overt s/sx of aspiration with thin liquid trials, likely due to consistently taking large, subsequent sips despite max cues. Declined trials of advanced solids this date when presented, despite attempts to educate rationale. Will follow per POC

## 2017-08-02 NOTE — PLAN OF CARE
Problem: Goal Outcome Summary  Goal: Goal Outcome Summary  OT/7A:  Cancel - pt with other discipline upon attempt.

## 2017-08-02 NOTE — PROGRESS NOTES
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  Now s/p ex lap with R hemicolectomy, end ileostomy, mucus fistula, partial omenectomy on 7/26.    Will benefit from more basal coverage during the day and less overnight.    Plan:  -increase NPH to 22 units qpm  -reduce glargine 42 units q24 h PM  -meal aspart 1unit/7g CHO ordered for meals and snacks  -aspart high correction q4h        Outpatient diabetes follow up: Dr. Eckert at [facility]             Interval History:   The last 24 hours progress and nursing notes reviewed.  Taking PO this morning-  Juice, milk, pudding.   No issues with continuous TF infusion (nepro at 60).  Slept well.  Feels he's getting stronger.  Creatinine keeps improving.      Recent Labs  Lab 08/02/17  0749 08/02/17  0543 08/02/17  0440 08/02/17  0117 08/01/17  2053 08/01/17  1705 08/01/17  1635 08/01/17  1127 08/01/17  0651  07/31/17  1806  07/31/17  0546  07/30/17  1739   GLC  --  112*  --   --   --  200*  --   --  141*  --  210*  --  101*  --  160*   *  --  104* 97 135*  --  179* 178*  --   < >  --   < >  --   < >  --    < > = values in this interval not displayed.            Review of Systems:   See interval hx          Medications:   PTA taking Januvia and glargine versus glargine and aspart per carb    Active Diet Order      Full Liquid Diet Nectar Thickened Liquids (pre-thickened or use instant food thickener)     Physical Exam:  Gen:  resting in bed, in NAD.    HEENT: NC/AT,  NJ feeding tube  Resp: unlabored at rest  Neuro: awake, answers some questions approp, but also asking if insurance co called about the james  additive  BP (!) 162/93 (BP Location: Right arm)  Pulse 94  Temp 98.1  F (36.7  C) (Oral)  Resp 24  Ht 1.829 m (6')  Wt 91.4 kg (201 lb 6.4 oz)  SpO2 93%  BMI 27.31 kg/m2           Data:     Lab Results   Component Value Date    A1C  07/06/2017     Canceled, Test credited   Below Assay Range  NOTIFIED LOENARD ONEILL AT 0538 ON 7/6/17 BY CHRISSY      A1C 9.4 02/16/2017    A1C 6.2 12/14/2016    A1C 6.1 10/27/2016    A1C 8.3 07/02/2012            Recent Labs   Lab Test  08/02/17   0543  08/01/17   1705   NA  150*  148*   POTASSIUM  4.5  4.4   CHLORIDE  123*  122*   CO2  21  22   ANIONGAP  6  5   GLC  112*  200*   BUN  49*  52*   CR  0.91  0.90   JACOB  8.0*  7.9*     CBC RESULTS:   Recent Labs   Lab Test  08/02/17   0543   WBC  5.0   RBC  2.71*   HGB  7.6*   HCT  24.4*   MCV  90   MCH  28.0   MCHC  31.1*   RDW  16.7*   PLT  90*     Janicehomer Guzman APRN -[id number]    Diabetes Management job code 024

## 2017-08-02 NOTE — PROGRESS NOTES
"Colorectal Surgery Progress Note      Subjective:  Pain controlled.  Eating malt-o-meal.  Wondering when he can eat more solid foods.  Removed TABITHA drain today.     Vitals:  Vitals:    08/01/17 1124 08/01/17 1650 08/01/17 2351 08/02/17 0413   BP: 142/89 153/90 166/90 152/87   BP Location: Right arm Right arm Right arm Right arm   Pulse:  94     Resp: 20 18 18 18   Temp: 98.5  F (36.9  C) 98.3  F (36.8  C) 98.2  F (36.8  C) 100.3  F (37.9  C)   TempSrc: Oral Oral Oral Oral   SpO2: 94% 94% 94% 95%   Weight: 91.4 kg (201 lb 6.4 oz)      Height:         I/O:  I/O last 3 completed shifts:  In: 4310.17 [P.O.:480; I.V.:430.17; NG/GT:1960]  Out: 2600 [Urine:250; Drains:600; Stool:1750]    Physical Exam:  Gen: Awake, alert, oriented to self.   Pulm: Norm resp effort  Abd: Soft, mildly distended but much less compared to prior   Incision  With staples and c/d/i   Ileostomy - pink, viable, red rubber via stoma, +succus.    Mucus fistula and incision dressed   TABITHA drain serosang output  Ext:      BMP    Recent Labs  Lab 08/02/17  0543 08/01/17  1705 08/01/17  0651 07/31/17  1806 07/31/17  0546   * 148* 150* 147* 147*   POTASSIUM 4.5 4.4 4.3 4.2 4.1   CHLORIDE 123* 122* 123* 121* 121*   CO2 21 22 20 19* 19*   BUN 49* 52* 51* 54* 56*   CR 0.91 0.90 0.90 0.95 1.05   * 200* 141* 210* 101*   MAG 2.0  --  1.9 2.0 2.1   PHOS 3.2  --  3.1 2.7 2.9     CBC    Recent Labs  Lab 08/02/17  0543 08/01/17  0651 07/31/17  0546 07/30/17  0605   WBC 5.0 4.7 4.9 5.2   HGB 7.6* 7.9* 7.7* 8.2*   HCT 24.4* 24.8* 24.6* 25.8*   PLT 90* 99* 105* 125*         ASSESSMENT: This is a 51 year old male with complex PMH of CASTAÑEDA cirrhosis s/p Liver transplant Mar 2017.  Was admitted on 7/4/2014 with abdominal pain, sepsis, CT at OSH showed \"right colonic wall thickening suggesting colitis\" underwent Ex-Lap and washout for neutropenic colitis, intra-op was noted to have inflammed cecum and ascending colon with murky fluid.  Abdomen was left open at the " time and was closed on 7/5/2017.  Some concern for CMV colitis with low count CMV viremia/GI PTLD and subsequently underwent colonoscopy on 7/21:  No colitis was seen on visualized portions of mucosa.  Exam sub-optimal as colon filled with solid stool.  Random biopsies obtained.  On 7/25/2017 pt became more tachycardic, increasing O2 needs and altered, pt was intubated, hypotension responsive to IVFs, worsening kidney function.  Has not required pressors.  CT was obtained which showed a new large heterogeneous right sided retroperitoneal gas and air tracking along duodenum.  No contrast extravasation.  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  Despite conservative management with bowel rest and IV abx, pt continued to spike fevers.  Now s/p Exploratory laparotomy, right angelique-colectomy, end ileostomy, mucus fistula, partial omentectomy on 7/26.  Intra-operatively no perforation noted, inflammation of right colon.  Pathology with     - no new recs today  - defer management to transplant  - removed TABITHA drain today  - WOCN for new ileostomy & mucus fistula  - now with ileostomy, do not recommend bowel regimens (miralax, senna-docusate, dulcolax, etc).    - For high ileostomy outputs, already receiving fiber.  Can slowly titrate imodium to max of 4mg QID.  If continue to have high outputs despite fiber and imodium, can start lomotil 1 tab BID-TID and titrate to max of 2 tabs QID.  After can consider cholestyramine and or opium tincture.  Typically recommend ileostomy output goal of about 1200mL in 24 hours +/- 200mL depending on oral intake.  Defer fluid status to transplant and nephrology.   - defer diet adv to SLP.  But once cleared for solid foods, recommend a low residue diet.   - OK for DVT ppx from colorectal perspective    Iqra Acosta PA-C   Colon and Rectal Surgery  2465     Patient was seen and discussed with Fellow, Dr. Lynch

## 2017-08-02 NOTE — PLAN OF CARE
Problem: Goal Outcome Summary  Goal: Goal Outcome Summary  PT-7A: Session limited secondary to transport present for pt x-ray. Performed bed mobility with verbal and tactile cueing for rolling and transferred lying->sitting with Min A x 2.  Transferred sit->stand with step pivot transfer from bed to chair + Min A x 2.  Pt requesting PT to continue tomorrow.     Recommend TCU once medically stable.

## 2017-08-02 NOTE — PLAN OF CARE
Problem: Individualization  Goal: Patient Preferences  Outcome: No Change     RN:  Tmax. 100.3 (O), OVSS, generalized pain controlled with Oxycodone, given x, patient continuous to be confused . TF@60cc/hr, with 75cc free H2O flush every hour for elevated Na. Blood glucose 97 and 104, no SS given. Incontinent of urine x3, ileostomy with moderate amount of loose/watery stools. TABITHA with moderate amount of serosanguinous output. Patient awake most of the shift, will continue to monitor.

## 2017-08-02 NOTE — PLAN OF CARE
Problem: Goal Outcome Summary  Goal: Goal Outcome Summary  OT/7A:  Met briefly with pt to discuss any last minute concerns with discharging home and resuming previous occupations with assist from parents PRN.  Educated pt on recommended technique for retrieving items located on low shelving - discussed sitting on chair to reach floor vs bending or using reacher; pt/family report that parents will be able to assist as needed.  Instructed pt in modifying environmental set up to have all frequently used items on higher shelving.  Pt verbally educated on car transfer - reports comfort with this after education. Introduced the idea of OP PT to progress strength and endurance; pt declines at this time and plans to continue HEPs and progress independently.  Instructed pt on the importance of self-monitoring, verbalized understanding.     REC:  Home with assist from family PRN.       Occupational Therapy Discharge Summary     Reason for therapy discharge:    All goals and outcomes met, no further needs identified.     Progress towards therapy goal(s). See goals on [facility] Plan in [facility] health record for goal details.  Goals met     Therapy recommendation(s):    No further therapy is recommended.  Continue home exercise program.

## 2017-08-02 NOTE — PROGRESS NOTES
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 Concern for CMV colitis with low count CMV viremia/GI PTLD and subsequently underwent colonoscopy on 7/21, random biopsies obtained.  On 7/25/2017 patient had respiratory distress, abdominal compartment syndrome and EJ with oliguria. Broad spectrum antibiotics were started.  He was intubated and had a paracentesis with 5L removed. He did not required pressors.  CT was obtained which showed a new large heterogeneous right sided retroperitoneal gas and air tracking along duodenum.  No contrast extravasation.  No intraperitoneal air noted. Colorectal surgery was consulted. Initial conservative management with bowel rest and IV abx. Pt continued to spike fevers despite IV abx.  Now s/p Exploratory laparotomy, right angelique-colectomy, end ileostomy, mucus fistula, partial omentectomy on 7/26.    Graft Function: Good function. LFTs WNL. US liver unremarkable.  Immunosuppression Management:   CellCept discontinued (6/27/17) due to neutropenia.   Prograf goal ~8 due to single IS. Level < 3. Repeat level < 3. Increase 5 mg BID.   Cardiorespiratory: Acute respiratory distress with hypercapnia.  Acidotic breathing secondary to sepsis. Improved after control of sepsis. Extubated on Friday. O2 supplement PRN. OOB to chair. Increase incentive spirometry use.   -Shallow breathing, increased work breathing seen today. O2 sat > 93% on 3L NC. CXR showing b/l opacities, pulm edema, b/l pleural effusions.  ABG ok. Will give bumex IV x 1. Check bcx x 2.  Hematology: Pancytopenia on admission, acute on  chronic, secondary to medications (MMF, bactrim, valcyte). Improved with holding medications and neupogen. 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  Continue to hold MMF and bactrim.   -Anemia- Hemoglobin stable. WBC stable. Last blood transfusion on 7/26.   -Cytology ascites fluid, negative for malignancy   GI: Neutropenic colitis. Colonoscopy 7/21. Biopsy results showing resolving injury secondary to possible drug induced vs infectious.   -CT scan abd/pelvis, po contrast, showed a new large heterogeneous right sided retroperitoneal gas and air tracking along duodenum.  No contrast extravasation.  No intraperitoneal air noted. Colorectal surgery consulted.  Continued to spike high grade temperature despite IV antibiotics therefore patient taken to OR. s/p Exploratory laparotomy, right angelique-colectomy, end ileostomy, mucus fistula, partial omentectomy on 7/26. Findings no neida perforation, phlegmon/inflammationright colon. Colon pathology suggested colon perforation, negative for malignancy; CMV stain positive.  WOCN consult for ostomy care.   -Honey thickened liquid diet. TF at goal.   Large output from the iliostomy - Output remains higher than goal. Goal ileostomy output ~ 1200 cc in 24 hrs.   loperamide 2mg q6H. Continue fiber TID.   Start lomotil BID.   Fluid/Electrolytes/Renal: EJ oliguric likely sec to high intraabdominal pressures and ischemic ATN sec to sepsis. Nephrology consulted. No indication for RRT presently. UO good, Creatinine 0.9.  Hypernatremia, free water deficit, high ileostomy output. Ileostomy output now decreased.  Na 150, increase  cc per hour. Check BMP this evening. K, Mg, Phos stable.   Endocrine: DM type 2. Endocrine consulting for glycemic management.  Infectious disease:   -Severe sepsis, right colon phlegmon. Meropenem/daptomycin/micafungin tx. S/p right angelique-colectomy. Path CMV stain +.  Fluid cx x 2 NGTD. Continue antibiotic x 10 days (10  days will be on 8/3).  -CMV viremia, CMV colitis per pathology. CMV D+R-. Continue IV ganciclovir. CMV PCR decreasing now. Follow CMV PCR weekly- next due 8/3  EBV viremia, repeat EBV PCR in process. Monitor q2 weeks.  -7/25 Ucx, Bcx and ascites cx negative to date  -ID consulting.   Neuro: Toxic metabolic encephalopathy secondary to infection, prolonged hospital stay, significant improvement.  Vascular: Right Arterial line clot 2/2 previous arterial line. Vascular consulted. Recommend ASA only. Some evidence of microvascular injury in digits (toes) this is most likely due to injury while patient was on pressor therapy and sepsis. Now with a third toe injury, vascular consulted again. US completed.  ECHO EF 60-65%. 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   Activity: Deconditioned due to prolong hospital course. Daily PT/OT, encourage pt to be OOB to chair. Encourage pulmonary toilet.   Prophylaxis: DVT-Heparin SQ  Disposition: 7A. Will transfer to TCU for rehab once status stable. Possible transfer in a few days.       Medical Decision Making: Medium  Admit [id number] (moderate level decision making)    PILLO/Fellow/Resident Provider: Ada Driver PA-C    Faculty: Chaparro Anderson M.D.    __________________________________________________________________  Transplant History:.  3/4/2017 DD OLT with Dr. Tran (Liver), Postoperative day: 151     Interval History:   Overnight events: Abdominal pain, per pt less intense. Fatigued. Minimal movement. Needs assistance to move and reposition in bed. Ambulated a few steps with assist of 2.     ROS:   A 10-point review of systems was negative except as noted above.    Meds:    diphenoxylate-atropine  2.5 mL Oral BID     [START ON 8/3/2017] insulin isophane human  22 Units Subcutaneous QAM     insulin isophane human  4 Units Subcutaneous Once     insulin glargine  42 Units Subcutaneous Q24H     tacrolimus  4 mg Oral BID IS     insulin aspart   1-12 Units Subcutaneous Q4H     fiber modular  1 packet Per Feeding Tube TID     ganciclovir (CYTOVENE) intermittent infusion  10 mg/kg (Dosing Weight) Intravenous Q12H     loperamide  2 mg Oral or Feeding Tube 4x Daily     insulin aspart   Subcutaneous TID w/meals     meropenem  1 g Intravenous Q8H     protein modular  1 packet Per Feeding Tube TID     DAPTOmycin (CUBICIN) intermittent infusion  8 mg/kg (Dosing Weight) Intravenous Q24H     micafungin  100 mg Intravenous Q24H     aspirin  80 mg Oral Daily     multivitamins with minerals  15 mL Per Feeding Tube Daily     heparin  5,000 Units Subcutaneous Q8H     pantoprazole  40 mg Oral or Feeding Tube Daily     sodium chloride (PF)  3 mL Intracatheter Q8H       Physical Exam:     Admit Weight: 94.2 kg (207 lb 11.2 oz) (SCALE 2)    Current vitals:   BP (!) 162/93 (BP Location: Right arm)  Pulse 94  Temp 98.1  F (36.7  C) (Oral)  Resp 24  Ht 1.829 m (6')  Wt 91.4 kg (201 lb 6.4 oz)  SpO2 93%  BMI 27.31 kg/m2    Vital sign ranges:    Temp:  [98.1  F (36.7  C)-100.3  F (37.9  C)] 98.1  F (36.7  C)  Pulse:  [94] 94  Heart Rate:  [] 93  Resp:  [18-24] 24  BP: (152-166)/(87-93) 162/93  SpO2:  [93 %-95 %] 93 %  Patient Vitals for the past 24 hrs:   BP Temp Temp src Pulse Heart Rate Resp SpO2   08/02/17 0742 (!) 162/93 98.1  F (36.7  C) Oral - 93 24 93 %   08/02/17 0413 152/87 100.3  F (37.9  C) Oral - 100 18 95 %   08/01/17 2351 166/90 98.2  F (36.8  C) Oral - 99 18 94 %   08/01/17 1650 153/90 98.3  F (36.8  C) Oral 94 94 18 94 %     General Appearance: NAD  Skin: normal, warm, dry  Heart: RRR  Lungs: B/l decreased bases, shallow breathing.  Abdomen: soft, non distended, more ttp RLQ. Ileostomy with brown output. Mucus fistula, pink. Midline incision, c/d/i. Chevron incision well healed. TABITHA removed this AM  : No vazuqez.   Extremities: No edema. Ext NT x 4.  Neurologic: A&O x 3.       Data:   CMP    Recent Labs  Lab 08/02/17  0543 08/01/17  1705 08/01/17  0688   07/27/17  0410  07/26/17  2133 07/26/17  2046   * 148* 150*  < > 142  < > 139 139   POTASSIUM 4.5 4.4 4.3  < > 3.5  < > 3.5 3.5   CHLORIDE 123* 122* 123*  < > 108  < >  --   --    CO2 21 22 20  < > 20  < >  --   --    * 200* 141*  < > 164*  < > 155* 151*   BUN 49* 52* 51*  < > 72*  < >  --   --    CR 0.91 0.90 0.90  < > 2.82*  < >  --   --    GFRESTIMATED 87 88 89  < > 24*  < >  --   --    GFRESTBLACK >90African American GFR Calc >90African American GFR Calc >90African American GFR Calc  < > 29*  < >  --   --    JACOB 8.0* 7.9* 8.1*  < > 7.9*  < >  --   --    ICAW  --   --   --   --   --   --  4.4 4.3*   MAG 2.0  --  1.9  < > 2.0  --   --   --    PHOS 3.2  --  3.1  < > 5.8*  --   --   --    ALBUMIN 2.3*  --   --   --  2.4*  --   --   --    BILITOTAL 0.4  --   --   --  1.3  --   --   --    ALKPHOS 199*  --   --   --  143  --   --   --    AST 62*  --   --   --  27  --   --   --    ALT 42  --   --   --  27  --   --   --    < > = values in this interval not displayed.  CBC    Recent Labs  Lab 08/02/17  0543 08/01/17  0651   HGB 7.6* 7.9*   WBC 5.0 4.7   PLT 90* 99*     COAGS    Recent Labs  Lab 07/26/17  2243 07/26/17  2017 07/26/17  1620   INR 1.31* 1.31* 1.30*   PTT  --  39* 44*      Urinalysis  Recent Labs   Lab Test  07/28/17   1042  07/25/17   1205   06/14/17   1508   04/11/16   1345   COLOR  Yellow  Dark Yellow   < >   --    < >  Yellow   APPEARANCE  Slightly Cloudy  Cloudy   < >   --    < >  Clear   URINEGLC  Negative  70*   < >   --    < >  30*   URINEBILI  Negative  Negative   < >   --    < >  Negative   URINEKETONE  Negative  Negative   < >   --    < >  Negative   SG  1.012  1.014   < >   --    < >  1.016   UBLD  Trace*  Moderate*   < >   --    < >  Small*   URINEPH  5.0  5.0   < >   --    < >  5.0   PROTEIN  30*  100*   < >   --    < >  30*   NITRITE  Negative  Negative   < >   --    < >  Negative   LEUKEST  Negative  Trace*   < >   --    < >  Negative   RBCU  5*  >182*   < >   --    < >  1   WBCU   "6*  5*   < >   --    < >  1   UTPG   --    --    --   1.55*   --   0.41*    < > = values in this interval not displayed.          7/21/2017   SPECIMEN(S):   A: Colon biopsy, ascending   B: Colon biopsy, descending     FINAL DIAGNOSIS:   A. COLON BIOPSY, ASCENDING:   - Colonic mucosa with no significant histologic abnormality   - Negative for intraepithelial lymphocytosis or deposition of   subepithelial thick collagenous band     B. COLON BIOPSY, DESCENDING:   - Crypt architecture distortion, consistent with healed prior injury   - Negative for active inflammation or dysplasia   - Negative for intraepithelial lymphocytosis or deposition of   subepithelial thick collagenous band   - Please, see comment     COMMENT:   Specimen B, \"descending colon\" features lamina propria edema, slight   variation in crypt sizes and shapes and occasional crypt drop-out.  This   may indicate a resolving injury which could be drug-induced or   infectious.     CT scan 7/25/2017:   IMPRESSION:   1. New large heterogeneous area of right-sided retroperitoneal gas  which is highly concerning for an infectious process. Given the  history of prior neutropenic colitis and biopsies, there could also be  a component of stool from a ruptured viscus, despite the lack of  surrounding bowel loops and no extraluminal oral contrast. Surgical  consultation is recommended.      2. Bibasilar atelectasis versus consolidation with small bilateral  pleural effusions.     3. Extensive mesenteric and soft tissue edema with large volume  ascites. Numerous mesenteric and retroperitoneal lymph nodes which are  presumably reactive.     4. Postsurgical changes of liver transplant.     [Urgent Result: Retroperitoneal gas]     Finding was identified on 7/25/2017 5:34 PM.      Dr. Santoyo was contacted by Dr. Atkins at 7/25/2017 5:37 PM and  verbalized understanding of the urgent finding.      I have personally reviewed the examination and initial interpretation  and " I agree with the findings.     LUCI HOROWITZ [facility]    Attestation:    The patient has been seen and evaluated by me.   Vital signs, labs, medications and orders were reviewed.   When obtained, diagnostic images were reviewed by me and interpreted as above.    The care plan was discussed with the multidisciplinary team and I agree with the findings and plan in this note, with any differences recorded in blue.    Immunosuppressive medication management was reviewed and adjusted as reflected in the note and orders.     .

## 2017-08-02 NOTE — PROGRESS NOTES
[facility]  Transplant Infectious Diseases Inpatient Progress Note      Camacho Bhagat MRN# [id number]   YOB: 1965 Age: 51 year old   Date of Service: 8/02//2017  Transplant: 3/4/2017 (Liver), Postoperative day: 151         Assessment and recommendations:     Recommendations:    - Please take procalcitonin and NT Pro BNP; diurese him if possible  - Continue with ganciclovir 10 mg / kg BID (adjust doses to his current kidney function)  - Repeat blood cultures and continue broad spectrum meropenem/daptomycin/micafungin to complete at least a ten day course post-op (through 8/5/17)  - Check CMV PCR tomorrow and continue with weekly EBV PCR    Assessment:    CMV viremia + organ disease  - Patient presented detected low level CMV viremia (detected < 137) on admission and progressive worsening viremia after stopping valcyte prophylaxis   - Re-started valcyte on 07/15 with 4225 IU (3.6 log) and switched to GCV on 07/20 with 1906 IU (3.3). After 1 week of GCV treatment, CMV PCR dropped to 290 (2.5 log), which represents +/- 1 log less that the previous reading. Tomorrow will be the next reading.  - Of note, CMV immunostain was positive from the colonic tissue confirming the etiology of the colitis  - Therefore, we recommend to continue with GCV 10 mg/kg BID for now. Please adjust doses to his current kidney function.      Intermittent fever secondary to CMV viremia overimposed infection?  - Patient has a prolonged hospital course with recurrent fever and encephalopathy despite broad spectrum antibiotics.  - Blood cultures has been negative so far and only colonizer elvie was found in previous BALs  - Currently, patient has labored breathing and abdominal pain, so these are other possible sources of infection.   - Of note, CMV viremia is improving and fever seems to be of less grade in the last days, but viral infection may not fully explain current patient symptoms.  - Patient  underwent a surgery +/- 2 weeks ago and current abdominal symptoms + fever pattern may be related to any residual abscess secondary to leak.  - Also, CXR shows parahiliar infiltrates with few opacities mainly in the right lung, reported as atelectasis, edema or infection. Of note, patient is breathing in room air with normal O2 Sats and no reporting SOB or cough.  - Since this pulmonary lesions may represent fluid overload, we recommend to order a RT-PRO BNP and diurese him if possible.   - In addition, please order blood cultures and procalcitonin; if patient persists with fever or clinical deterioration, then we will recommend to order a CT chest + abdomen to rule out any other possible infection source.  - Continue with the established antimicrobial therapy for now (meropenem/daptomycin/micafungin). Of note, CPK resulted normal, so it is safe to continue with daptomycin.    EBV viremia, improving  - PAtient developed EBV viremia, intially of [id number]  - EBV PCR taken on 08/01 showed improvement (6864)  - Please keep monitoring weekly    Multifactorial pancytopenia, improving  - Likely related to medication and CMV viremia  - Currently without neutropenia  - Monitor ANC closely    S/p liver transplant  - continue with tacrolimus; hold MMF in the context of sepsis and pancytopenia  - Once pancytopenia improves, consider to re-start PCP prophylaxis  - we will follow      Thank you for allowing us to participate in his care and call us if any question arises. These recommendations were discussed with the primary team.    Genaro Norris MD  IM PGY-1  P: [phone]    Hospital course   Mr. Bhagat is a 51 years old patient with PMH of DM, HTN, fibromyalgia, previous DVT with IVC filter,  s/p liver transplant secondary to ESLD due to CASTAÑEDA, CMV D+/R-, EBV D+/R-, who was admitted on 07/04 due to neutropenic enterocolitis starting empiric therapy with zosyn + flagyl + vancomycin + micafungin being transferred to the  ICU, requiring exploratory laparotomy + wash out for neutropenic enterocolitis on 07/04, reporting inflamed cecum and ascending colon, having a second look on 07/05 finding improvement of the inflammation and performing closure of abdominal incision; culture resulted S. Capitis considered a contaminant. Valcyte was stopped since admission. On 07/06, flagyl + vancomycin + micafungin were stopped and zosyn changed to ertapenem. As per ID note from 07/06, recommended to switch back ertapenem to zosyn since thrombocytopenia was seen before zosyn treatment, pancytopenia possibly related to medication (MMF, bactrim, valcyte, initially seen at the beginning of June) and recommended to monitor CMV PCR weekly ( on 07/03: positive < 137; before on 06/26 was ND). Additionally, on 07/12 a BAL was repeated and showed VRE/CONS/P putida/C. Albicans, all were considered a colonizers, and primary team continued with ertapenem, held MMF, bactrim, valcyte). On 07/13, it was recommended to re-start valcyte since his CMV PCR was 145 and counts improved. Patient persisted febrile, with evident ascitis tapped but culture resulted negative. Course complicated with thrombosis of the right arterial artery with ischemia to the tip of the ischemic finger. On 07/15, BAL was repeated and was positive for rhinovirus. Valcyte was re-initiated on 07/15 due to CMV PCR: 4225. PAtient was discharged from the ICU on 07/17. Also, 14 days of ertapenem were completed on 07/18 and EBV PCR was taken on 07/18 and resulted [id number] concerning of PTLD since patient persisted febrile with ascitis and colitis. On 07/20, ZAKIA was switched to GCV due to worsening level of 1906. Patient became encephalopathic but MRI resulted negative. On 07/21 a colonoscopy was performed and showed normal colonic mucosa; random biopsies were taken. On 07/25, patient deteriorated clinically presenting respiratory failure requiring intubation, so was transferred to ICU and started  empiric treatment with meropenem + daptomycin + micafungin, and GCV dose was increased to 10 mg BID. A CT abdomen was repeated and showed retroperitoneal air so underwent EL + lysis of adhesions + right hemicolectomy + ileostomy + partial omentectomy  due to ascending colitis (no neida perforation or ischemia). On 07/27 CMV PCR resulted 290. Patient persists with intermittent fever and encephalopathy.    Subjective: Patient is drowsy, but awakes confused upon stimulus. Patient has been subfebrile this morning (Temp: 100.3) and denies any SOB/pain, but his breathing is labored and grimaces when pressing his abdomen.     Current Scheduled Medications:    diphenoxylate-atropine  2.5 mL Oral BID     [START ON 8/3/2017] insulin isophane human  22 Units Subcutaneous QAM     insulin glargine  42 Units Subcutaneous Q24H     tacrolimus  5 mg Oral BID IS     insulin aspart  1-12 Units Subcutaneous Q4H     fiber modular  1 packet Per Feeding Tube TID     ganciclovir (CYTOVENE) intermittent infusion  10 mg/kg (Dosing Weight) Intravenous Q12H     loperamide  2 mg Oral or Feeding Tube 4x Daily     insulin aspart   Subcutaneous TID w/meals     meropenem  1 g Intravenous Q8H     protein modular  1 packet Per Feeding Tube TID     DAPTOmycin (CUBICIN) intermittent infusion  8 mg/kg (Dosing Weight) Intravenous Q24H     micafungin  100 mg Intravenous Q24H     aspirin  80 mg Oral Daily     multivitamins with minerals  15 mL Per Feeding Tube Daily     heparin  5,000 Units Subcutaneous Q8H     pantoprazole  40 mg Oral or Feeding Tube Daily     sodium chloride (PF)  3 mL Intracatheter Q8H     Physical Examination:  Vital sign ranges over the past 24 hours:  Temp:  [98.1  F (36.7  C)-100.3  F (37.9  C)] 99.8  F (37.7  C)  Pulse:  [93-94] 93  Heart Rate:  [] 93  Resp:  [18-26] 18  BP: (144-166)/(79-93) 144/79  SpO2:  [92 %-95 %] 92 %    Vitals:    07/26/17 0400 07/27/17 0400 07/29/17 0000 07/30/17 1332   Weight: 106.2 kg (234 lb 2.1  oz) 99 kg (218 lb 4.1 oz) 98.2 kg (216 lb 7.9 oz) 94.4 kg (208 lb 1.6 oz)    08/01/17 1124   Weight: 91.4 kg (201 lb 6.4 oz)     Intake/Output Summary (Last 24 hours) at 07/31/17 1615  Last data filed at 07/31/17 1607   Gross per 24 hour   Intake             2000 ml   Output             2180 ml   Net             -180 ml     Physical Examination:  GENERAL:  Awake, responsive, shallow breathing  EYES:  EOMI, PERRL, anicteric sclerae.  ENT:  No otorrhea.  NJ tube present.  No nasal cannula present.  No anterior oral lesion.  NECK:  Supple. CVC RIJ without surrounding erythema or discharge  LUNGS:  Few bilaterally scattered coarse rhonchi.  CARDIOVASCULAR:  Regular rate and rhythm, tachycardic, normal S1, S2, without murmur, gallop, or rub.  ABDOMEN:  Normal bowel sounds, soft, diffuse tenderness on deep palpation, midline wound is intact without inflammation with proximal mucous fistula, RLQ ileostomy present.  EXTREMITIES:  Distally warm, no edema,  ischemic right hallux and right second toe, also ischemic tip of left second toe, and right index, no ulcers.  Peripheral left upper forearm without surrounding erythema or drainage.  NEUROLOGIC:  drowsy but arousable, confused when awakes, moves extremities x 4, no meningeal signs.    Inflammatory Markers    Recent Labs   Lab Test  07/05/17   1810  03/05/17   0358  11/23/16   0813  11/16/16   0706  11/15/16   1039  11/07/16   0759  11/03/16   0949  11/01/16   0853   08/15/11   1151  04/11/11   1209  01/03/11   1246  02/15/10   1232   SED   --    --   45*   --    --    --    --    --    --   11  14  17*  25*   CRP  354.0  20.0*  58.0*  59.1*  49.8*  66.0*  140.0*  150.0*   < >  11.1*  9.0*  11.7*  17.5*    < > = values in this interval not displayed.     Immune Globulin Studies     Recent Labs   Lab Test  07/24/17   0705  08/15/11   1243   IGG  1660*  1160   IGG1   --   734   IGG2   --   294   IGG3   --   7*   IGG4   --   59     Metabolic Studies       Recent Labs   Lab  Test  08/02/17   0543  08/01/17   1705  08/01/17   0651  07/31/17   1806  07/31/17   1229  07/31/17   0546  07/30/17   1739  07/30/17   0826   07/25/17   0945   07/06/17   0316   NA  150*  148*  150*  147*   --   147*  148*   --    < >  137   < >  143   POTASSIUM  4.5  4.4  4.3  4.2   --   4.1  4.1   --    < >  4.0   < >  5.0   CHLORIDE  123*  122*  123*  121*   --   121*  121*   --    < >  104   < >  116*   CO2  21  22  20  19*   --   19*  19*   --    < >  26   < >  18*   ANIONGAP  6  5  7  7   --   8  8   --    < >  7   < >  9   BUN  49*  52*  51*  54*   --   56*  55*   --    < >  77*   < >  62*   CR  0.91  0.90  0.90  0.95   --   1.05  1.06   --    < >  2.60*   < >  2.75*   GFRESTIMATED  87  88  89  83   --   74  73   --    < >  26*   < >  24*   GLC  112*  200*  141*  210*   --   101*  160*   --    < >  382*   < >  139*   A1C   --    --    --    --    --    --    --    --    --    --    --   Canceled, Test credited   Below Assay Range  NOTIFIED LEONARD ONEILL AT 0538 ON 7/6/17 BY CHRISSY     JACOB  8.0*  7.9*  8.1*  8.0*   --   8.0*  7.9*   --    < >  7.6*   < >  6.9*   PHOS  3.2   --   3.1  2.7   --   2.9  2.3*   --    < >   --    < >  5.5*   MAG  2.0   --   1.9  2.0   --   2.1  2.1   --    < >   --    < >  2.1   LACT   --    --    --    --   1.0   --    --   0.6*   < >   --    < >  1.5   CKT   --    --   16*   --    --    --    --    --    --   40   --    --     < > = values in this interval not displayed.     Hepatic Studies    Recent Labs   Lab Test  08/02/17   0543  07/27/17   0410  07/25/17   1651  07/25/17   0945  07/25/17   0017  07/24/17   0705  07/23/17   0625   07/11/17   0328   07/05/17   1810   BILITOTAL  0.4  1.3  0.6  0.5   --   0.4  0.3   < >  0.5   < >   --    ALKPHOS  199*  143  242*  317*   --   264*  224*   < >  158*   < >   --    ALBUMIN  2.3*  2.4*  2.0*  1.9*   --   1.7*  1.9*   < >  1.8*   < >   --    AST  62*  27  61*  90*   --   86*  63*   < >  34   < >   --    ALT  42  27  50  60   --   54   47   < >  27   < >   --    LDH   --    --    --    --   258*   --    --    --    --    --   289*   GGT   --    --    --    --    --    --    --    --   58   --    --     < > = values in this interval not displayed.     Pancreatitis testing    Recent Labs   Lab Test  07/24/17   0705  07/17/17   0630  07/12/17   0342  07/10/17   0340  07/05/17   0346  03/05/17   0358  03/03/17   1458  02/21/17   1520  12/09/16   1159  02/08/16   1144   09/30/10   1734   AMYLASE   --    --    --    --    --   53  70   --    --    --    --   82   LIPASE   --    --    --    --    --   216   --   326  161   --    --   138   TRIG  303*  168*  114  177*  174*   --    --    --    --   99   < >   --     < > = values in this interval not displayed.     Hematology Studies      Recent Labs   Lab Test  08/02/17   0543  08/01/17   0651  07/31/17   0546  07/30/17   0605  07/29/17   0336  07/28/17   0410   WBC  5.0  4.7  4.9  5.2  6.3  9.3   ANEU  2.7  2.5  2.8  3.2  4.4  7.1   ALYM  2.0  1.8  1.7  1.6  1.5  1.6   ZINA  0.2  0.2  0.3  0.3  0.2  0.5   AEOS  0.1  0.1  0.1  0.1  0.1  0.1   HGB  7.6*  7.9*  7.7*  8.2*  8.1*  8.0*   HCT  24.4*  24.8*  24.6*  25.8*  24.8*  24.4*   PLT  90*  99*  105*  125*  121*  128*     Arterial Blood Gas Testing    Recent Labs   Lab Test  08/02/17   1216  07/26/17   2243  07/26/17   2133  07/26/17   2046  07/26/17 2017 07/25/17   1746  07/25/17   1037   PH  7.37  Incorrect specimen type  NOTTIED BY WOJCIECH DEWITT, NEW ORDER TO REPLACE.7/26/17 AT 2346 BY ZAINAB.  CORRECTED ON 07/26 AT 2350: PREVIOUSLY REPORTED AS 7.27     --    --   Canceled, Test credited   Incorrect specimen type    7.32*  7.14*   PCO2  31*  Incorrect specimen type  NOTTIED BY WOJCIECH DEWITT, NEW ORDER TO REPLACE.7/26/17 AT 2346 BY ZAINAB.  CORRECTED ON 07/26 AT 2350: PREVIOUSLY REPORTED AS 45     --    --   Canceled, Test credited   Incorrect specimen type    42  69*   PO2  69*  Incorrect specimen type  NOTTIED BY WOJCIECH DEWITT, NEW ORDER TO REPLACE.7/26/17  AT 2346 BY ZAINAB.  CORRECTED ON 07/26 AT 2350: PREVIOUSLY REPORTED AS 53     --    --   Canceled, Test credited   Incorrect specimen type    81  103   HCO3  18*  Incorrect specimen type  NOTTIED BY WOJCIECH DEWITT, NEW ORDER TO REPLACE.7/26/17 AT 2346 BY ZAINAB.  CORRECTED ON 07/26 AT 2350: PREVIOUSLY REPORTED AS 20     --    --   Canceled, Test credited   Incorrect specimen type    22  23   O2PER  21  Incorrect specimen type  NOTTIED BY WOJCIECH DEWITT, NEW ORDER TO REPLACE.7/26/17 AT 2346 BY ZAINAB.  CORRECTED ON 07/26 AT 2350: PREVIOUSLY REPORTED AS 40    40  62  100  100.0  100  40.0  7L     Urine Studies     Recent Labs   Lab Test  07/28/17   1042  07/25/17   1205  07/19/17   1150  07/11/17   1105  07/06/17   1441   URINEPH  5.0  5.0  5.0  5.0  5.0   NITRITE  Negative  Negative  Negative  Negative  Negative   LEUKEST  Negative  Trace*  Negative  Negative  Trace*   WBCU  6*  5*  2  <1  9*     Vancomycin Levels     Recent Labs   Lab Test  07/06/17   0316  10/28/16   1405   VANCOMYCIN  22.1  14.4       Microbiology:  Ascites  7/26/2017 (OR) negative to date.   Gram stain:  NOS, few WBCs.    7/25/2017 negative to date.  Gram stain:  NOS, no WBCs.    7/5/2017 S capitis    BAL   7/15/2017 yeast and Rhinovirus     7/12/2017 single colony of VRE, C albicans/dubliniensis   Sputum  7/29/2017 Normal elvie, heavy C albicans    7/11/2017 VRE and Coag Neg Staph     Bcxs:    7/31 x 2, 7/28 x 2, 7/25 x 2, 7/15 NGTD    Ur cxs  7/25 negative        CMV viral loads    Recent Labs   Lab Test  07/27/17   1109  07/20/17   1427  07/18/17   0530  07/15/17   1553  07/10/17   0340   CSPEC  EDTA PLASMA  CORRECTED ON 07/28 AT 0840: PREVIOUSLY REPORTED AS Blood    Plasma  Plasma  Bronchoalveolar Lavage  EDTA PLASMA   CMVLOG  2.5*  3.3*  3.0*  3.6*  2.2*     CMV viral loads    Log IU/mL of CMVQNT   Date Value Ref Range Status   07/27/2017 2.5 (H) <2.1 [Log_IU]/mL Final   07/20/2017 3.3 (H) <2.1 [Log_IU]/mL Final   07/18/2017 3.0 (H) <2.1 [Log_IU]/mL  Final   07/15/2017 3.6 (H) <2.1 [Log_IU]/mL Final   07/10/2017 2.2 (H) <2.1 [Log_IU]/mL Final   07/03/2017 <2.1 <2.1 [Log_IU]/mL Final   06/26/2017 Not Calculated <2.1 [Log_IU]/mL Final     CMV resistance testing    EBV DNA Copies/mL   Date Value Ref Range Status   08/01/2017 6864 (A) EBVNEG [Copies]/mL Final   07/18/2017 339649 (A) EBVNEG [Copies]/mL Final     Pathology   Colectomy path 7/26/17  - Colonic wall with perforation, edema, and acute serositis   - Background colonic mucosa with no significant histologic abnormality   - CMV immunostain is positive in rare (3) cells   - Terminal ileum with no significant histologic abnormality   - Viable, unremarkable surgical margins   - One benign lymph node (0/1)   - Appendix with fibrous obliteration of the tip     Colon biopsies 7/21/2017   A: Colon biopsy, ascending   B: Colon biopsy, descending   FINAL DIAGNOSIS:   A. COLON BIOPSY, ASCENDING:   - Colonic mucosa with no significant histologic abnormality   - Negative for intraepithelial lymphocytosis or deposition of   subepithelial thick collagenous band   B. COLON BIOPSY, DESCENDING:   - Crypt architecture distortion, consistent with healed prior injury   - Negative for active inflammation or dysplasia   - Negative for intraepithelial lymphocytosis or deposition of   subepithelial thick collagenous band     Cytology of ascitic fluid 7/25/2017   PERITONEAL FLUID, ASCITES:   -Negative for malignancy   Cytology of ascitic fluid 7/14/2017.   Peritoneal fluid, ascites:   - Negative for malignancy   - Acute and chronic inflammation present   Specimen Adequacy: Satisfactory for evaluation.   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   INTERPRETATION:   Ascites fluid:        Rare to absent viable B cells     COMMENT:   This specimen was collected on 7/14/17 but was not ordered/delivered to   lab/run until 7/18/17 - results should be interpreted with caution due   to low cellularity/viability.     Due to limited  cellularity we were only able to analyze the B cells.   There is no immunophenotypic evidence of B cell non-Hodgkin lymphoma.   Neoplastic cells, including large cells, may not survive specimen   processing. This sample may not be representative. Final interpretation   requires correlation with morphologic and clinical features.     Imaging:  CXR 8/02/17  1. Decreased lung volumes with mildly increased perihilar and  bibasilar opacities, which may represent pulmonary edema, atelectasis,  or infection.  2. Small right pleural effusion.      CXR 7/31/2017  1. Improving perihilar and bibasilar opacities likely represent  infection/aspiration, atelectasis, or pulmonary edema.  Moderate  opacities bilaterally persist.   2. Stable small to moderate pleural effusions bilaterally.    CXR 7/28/2017  1.  Interval removal of the endotracheal tube.  2.  Increased perihilar opacities, worsening infection versus  pulmonary edema.   3.  Increased bilateral moderate pleural effusions with overlying  atelectasis/consolidation.     CT c/a/p 7/25/2017  1. New large heterogeneous area of right-sided retroperitoneal gas  which is highly concerning for an infectious process. Given the  history of prior neutropenic colitis and biopsies, there could also be  a component of stool from a ruptured viscus, despite the lack of  surrounding bowel loops and no extraluminal oral contrast. Surgical  consultation is recommended.   2. Bibasilar atelectasis versus consolidation with small bilateral  pleural effusions.  3. Extensive mesenteric and soft tissue edema with large volume  ascites. Numerous mesenteric and retroperitoneal lymph nodes which are  presumably reactive.  4. Postsurgical changes of liver transplant.    CT c/a/p7/13/2017  1. Improved moderate right-sided pleural effusion and resolution of  left-sided pleural effusion. There remain large areas of  atelectasis/consolidation in both lungs, including in the left lower  lobe despite  resolution of left-sided pleural effusion. Superimposed  infectious process cannot be excluded.  2. Moderate-large amount of ascites increased from 7/8/2017, which  makes it difficult to evaluate for the presence or absence of  inflammatory change or infectious process in the abdomen or pelvis. No  intra-abdominal abscess.  3. Stable small presumed hematoma within the ventral abdominal wall  fat.  4. Splenomegaly.    MRI brain 7/20/2017  1. Significant image degradation due to motion artifact, with no  definite acute intracranial pathology. No abnormal contrast enhancing  intracranial lesions.  2. Mucosal thickening in the sphenoid and maxillary sinuses and left  greater than right mastoid fluid are nonspecific in the setting of  recent intubation.

## 2017-08-03 ENCOUNTER — APPOINTMENT (OUTPATIENT)
Dept: SPEECH THERAPY | Facility: CLINIC | Age: 52
End: 2017-08-03
Attending: TRANSPLANT SURGERY
Payer: COMMERCIAL

## 2017-08-03 ENCOUNTER — APPOINTMENT (OUTPATIENT)
Dept: PHYSICAL THERAPY | Facility: CLINIC | Age: 52
End: 2017-08-03
Attending: TRANSPLANT SURGERY
Payer: COMMERCIAL

## 2017-08-03 LAB
ANION GAP SERPL CALCULATED.3IONS-SCNC: 6 MMOL/L (ref 3–14)
ANION GAP SERPL CALCULATED.3IONS-SCNC: 6 MMOL/L (ref 3–14)
BACTERIA SPEC CULT: NO GROWTH
BACTERIA SPEC CULT: NO GROWTH
BASOPHILS # BLD AUTO: 0 10E9/L (ref 0–0.2)
BASOPHILS NFR BLD AUTO: 0.4 %
BUN SERPL-MCNC: 50 MG/DL (ref 7–30)
BUN SERPL-MCNC: 53 MG/DL (ref 7–30)
CALCIUM SERPL-MCNC: 8 MG/DL (ref 8.5–10.1)
CALCIUM SERPL-MCNC: 8.2 MG/DL (ref 8.5–10.1)
CHLORIDE SERPL-SCNC: 119 MMOL/L (ref 94–109)
CHLORIDE SERPL-SCNC: 120 MMOL/L (ref 94–109)
CMV DNA SPEC NAA+PROBE-ACNC: ABNORMAL [IU]/ML
CMV DNA SPEC NAA+PROBE-LOG#: <2.1 {LOG_IU}/ML
CMV DNA SPEC QL NAA+PROBE: NORMAL
CO2 SERPL-SCNC: 18 MMOL/L (ref 20–32)
CO2 SERPL-SCNC: 20 MMOL/L (ref 20–32)
CREAT SERPL-MCNC: 0.9 MG/DL (ref 0.66–1.25)
CREAT SERPL-MCNC: 0.91 MG/DL (ref 0.66–1.25)
DIFFERENTIAL METHOD BLD: ABNORMAL
EOSINOPHIL # BLD AUTO: 0 10E9/L (ref 0–0.7)
EOSINOPHIL NFR BLD AUTO: 0.8 %
ERYTHROCYTE [DISTWIDTH] IN BLOOD BY AUTOMATED COUNT: 16.8 % (ref 10–15)
GFR SERPL CREATININE-BSD FRML MDRD: 87 ML/MIN/1.7M2
GFR SERPL CREATININE-BSD FRML MDRD: 88 ML/MIN/1.7M2
GLUCOSE BLDC GLUCOMTR-MCNC: 118 MG/DL (ref 70–99)
GLUCOSE BLDC GLUCOMTR-MCNC: 122 MG/DL (ref 70–99)
GLUCOSE BLDC GLUCOMTR-MCNC: 139 MG/DL (ref 70–99)
GLUCOSE BLDC GLUCOMTR-MCNC: 162 MG/DL (ref 70–99)
GLUCOSE BLDC GLUCOMTR-MCNC: 165 MG/DL (ref 70–99)
GLUCOSE SERPL-MCNC: 124 MG/DL (ref 70–99)
GLUCOSE SERPL-MCNC: 163 MG/DL (ref 70–99)
HCT VFR BLD AUTO: 23.5 % (ref 40–53)
HGB BLD-MCNC: 7.3 G/DL (ref 13.3–17.7)
IMM GRANULOCYTES # BLD: 0 10E9/L (ref 0–0.4)
IMM GRANULOCYTES NFR BLD: 0.2 %
LYMPHOCYTES # BLD AUTO: 1.9 10E9/L (ref 0.8–5.3)
LYMPHOCYTES NFR BLD AUTO: 38.2 %
MAGNESIUM SERPL-MCNC: 1.8 MG/DL (ref 1.6–2.3)
MCH RBC QN AUTO: 27.5 PG (ref 26.5–33)
MCHC RBC AUTO-ENTMCNC: 31.1 G/DL (ref 31.5–36.5)
MCV RBC AUTO: 89 FL (ref 78–100)
MICRO REPORT STATUS: NORMAL
MICRO REPORT STATUS: NORMAL
MONOCYTES # BLD AUTO: 0.3 10E9/L (ref 0–1.3)
MONOCYTES NFR BLD AUTO: 5.9 %
NEUTROPHILS # BLD AUTO: 2.8 10E9/L (ref 1.6–8.3)
NEUTROPHILS NFR BLD AUTO: 54.5 %
NRBC # BLD AUTO: 0 10*3/UL
NRBC BLD AUTO-RTO: 0 /100
NT-PROBNP SERPL-MCNC: 562 PG/ML (ref 0–900)
PHOSPHATE SERPL-MCNC: 3.6 MG/DL (ref 2.5–4.5)
PLATELET # BLD AUTO: 92 10E9/L (ref 150–450)
POTASSIUM SERPL-SCNC: 4.8 MMOL/L (ref 3.4–5.3)
POTASSIUM SERPL-SCNC: 5 MMOL/L (ref 3.4–5.3)
RBC # BLD AUTO: 2.65 10E12/L (ref 4.4–5.9)
SODIUM SERPL-SCNC: 145 MMOL/L (ref 133–144)
SODIUM SERPL-SCNC: 145 MMOL/L (ref 133–144)
SPECIMEN SOURCE: ABNORMAL
SPECIMEN SOURCE: NORMAL
TACROLIMUS BLD-MCNC: ABNORMAL UG/L (ref 5–15)
TME LAST DOSE: ABNORMAL H
WBC # BLD AUTO: 5.1 10E9/L (ref 4–11)

## 2017-08-03 PROCEDURE — 84100 ASSAY OF PHOSPHORUS: CPT | Performed by: TRANSPLANT SURGERY

## 2017-08-03 PROCEDURE — 40000225 ZZH STATISTIC SLP WARD VISIT: Performed by: SPEECH-LANGUAGE PATHOLOGIST

## 2017-08-03 PROCEDURE — 25000125 ZZHC RX 250: Performed by: INTERNAL MEDICINE

## 2017-08-03 PROCEDURE — 97116 GAIT TRAINING THERAPY: CPT | Mod: GP

## 2017-08-03 PROCEDURE — 25000132 ZZH RX MED GY IP 250 OP 250 PS 637: Performed by: PHYSICIAN ASSISTANT

## 2017-08-03 PROCEDURE — 40000193 ZZH STATISTIC PT WARD VISIT

## 2017-08-03 PROCEDURE — 25000132 ZZH RX MED GY IP 250 OP 250 PS 637: Performed by: TRANSPLANT SURGERY

## 2017-08-03 PROCEDURE — 00000146 ZZHCL STATISTIC GLUCOSE BY METER IP

## 2017-08-03 PROCEDURE — 25000131 ZZH RX MED GY IP 250 OP 636 PS 637: Performed by: PHYSICIAN ASSISTANT

## 2017-08-03 PROCEDURE — 97530 THERAPEUTIC ACTIVITIES: CPT | Mod: GP

## 2017-08-03 PROCEDURE — 25000125 ZZHC RX 250: Performed by: STUDENT IN AN ORGANIZED HEALTH CARE EDUCATION/TRAINING PROGRAM

## 2017-08-03 PROCEDURE — 99212 OFFICE O/P EST SF 10 MIN: CPT

## 2017-08-03 PROCEDURE — 25000128 H RX IP 250 OP 636: Performed by: TRANSPLANT SURGERY

## 2017-08-03 PROCEDURE — 36592 COLLECT BLOOD FROM PICC: CPT | Performed by: TRANSPLANT SURGERY

## 2017-08-03 PROCEDURE — 27210913 ZZH NUTRITION PRODUCT INTERMEDIATE PACKET

## 2017-08-03 PROCEDURE — 80197 ASSAY OF TACROLIMUS: CPT | Performed by: PHYSICIAN ASSISTANT

## 2017-08-03 PROCEDURE — 12000025 ZZH R&B TRANSPLANT INTERMEDIATE

## 2017-08-03 PROCEDURE — 25000128 H RX IP 250 OP 636: Performed by: INTERNAL MEDICINE

## 2017-08-03 PROCEDURE — 25000128 H RX IP 250 OP 636: Performed by: PHYSICIAN ASSISTANT

## 2017-08-03 PROCEDURE — 25000132 ZZH RX MED GY IP 250 OP 250 PS 637: Performed by: SURGERY

## 2017-08-03 PROCEDURE — 85025 COMPLETE CBC W/AUTO DIFF WBC: CPT | Performed by: TRANSPLANT SURGERY

## 2017-08-03 PROCEDURE — 80048 BASIC METABOLIC PNL TOTAL CA: CPT | Performed by: TRANSPLANT SURGERY

## 2017-08-03 PROCEDURE — 92526 ORAL FUNCTION THERAPY: CPT | Mod: GN | Performed by: SPEECH-LANGUAGE PATHOLOGIST

## 2017-08-03 PROCEDURE — 25000128 H RX IP 250 OP 636: Performed by: STUDENT IN AN ORGANIZED HEALTH CARE EDUCATION/TRAINING PROGRAM

## 2017-08-03 PROCEDURE — 27210432 ZZH NUTRITION PRODUCT RENAL BASIC LITER

## 2017-08-03 PROCEDURE — 25000128 H RX IP 250 OP 636

## 2017-08-03 PROCEDURE — 83735 ASSAY OF MAGNESIUM: CPT | Performed by: TRANSPLANT SURGERY

## 2017-08-03 PROCEDURE — 83880 ASSAY OF NATRIURETIC PEPTIDE: CPT | Performed by: TRANSPLANT SURGERY

## 2017-08-03 PROCEDURE — 25000132 ZZH RX MED GY IP 250 OP 250 PS 637: Performed by: STUDENT IN AN ORGANIZED HEALTH CARE EDUCATION/TRAINING PROGRAM

## 2017-08-03 RX ORDER — DEXTROSE MONOHYDRATE 50 MG/ML
INJECTION, SOLUTION INTRAVENOUS
Status: COMPLETED
Start: 2017-08-03 | End: 2017-08-03

## 2017-08-03 RX ORDER — LOPERAMIDE HYDROCHLORIDE 1 MG/5ML
2-4 SOLUTION ORAL 4 TIMES DAILY
Status: DISCONTINUED | OUTPATIENT
Start: 2017-08-03 | End: 2017-08-04

## 2017-08-03 RX ADMIN — MICAFUNGIN SODIUM 100 MG: 10 INJECTION, POWDER, LYOPHILIZED, FOR SOLUTION INTRAVENOUS at 13:51

## 2017-08-03 RX ADMIN — Medication 1 PACKET: at 13:49

## 2017-08-03 RX ADMIN — LOPERAMIDE HYDROCHLORIDE 2 MG: 2 SOLUTION ORAL at 21:37

## 2017-08-03 RX ADMIN — PANTOPRAZOLE SODIUM 40 MG: 40 TABLET, DELAYED RELEASE ORAL at 08:56

## 2017-08-03 RX ADMIN — GANCICLOVIR SODIUM 950 MG: 500 INJECTION, POWDER, LYOPHILIZED, FOR SOLUTION INTRAVENOUS at 08:49

## 2017-08-03 RX ADMIN — LOPERAMIDE HYDROCHLORIDE 2 MG: 1 SOLUTION ORAL at 08:57

## 2017-08-03 RX ADMIN — DEXTROSE MONOHYDRATE 1000 ML: 50 INJECTION, SOLUTION INTRAVENOUS at 08:50

## 2017-08-03 RX ADMIN — OXYCODONE HYDROCHLORIDE 10 MG: 5 SOLUTION ORAL at 04:43

## 2017-08-03 RX ADMIN — Medication 80 MG: at 08:56

## 2017-08-03 RX ADMIN — Medication 2.5 ML: at 21:37

## 2017-08-03 RX ADMIN — Medication 1 PACKET: at 21:37

## 2017-08-03 RX ADMIN — Medication 1 PACKET: at 08:57

## 2017-08-03 RX ADMIN — LOPERAMIDE HYDROCHLORIDE 2 MG: 2 SOLUTION ORAL at 17:09

## 2017-08-03 RX ADMIN — TACROLIMUS 6 MG: 5 CAPSULE ORAL at 18:42

## 2017-08-03 RX ADMIN — INSULIN ASPART: 100 INJECTION, SOLUTION INTRAVENOUS; SUBCUTANEOUS at 14:19

## 2017-08-03 RX ADMIN — Medication 2 G: at 17:09

## 2017-08-03 RX ADMIN — MULTIVIT AND MINERALS-FERROUS GLUCONATE 9 MG IRON/15 ML ORAL LIQUID 15 ML: at 08:57

## 2017-08-03 RX ADMIN — HEPARIN SODIUM 5000 UNITS: 5000 INJECTION, SOLUTION INTRAVENOUS; SUBCUTANEOUS at 17:09

## 2017-08-03 RX ADMIN — TACROLIMUS 5 MG: 5 CAPSULE ORAL at 08:56

## 2017-08-03 RX ADMIN — ACETAMINOPHEN 650 MG: 325 SOLUTION ORAL at 17:09

## 2017-08-03 RX ADMIN — HEPARIN SODIUM 5000 UNITS: 5000 INJECTION, SOLUTION INTRAVENOUS; SUBCUTANEOUS at 09:01

## 2017-08-03 RX ADMIN — MEROPENEM 1 G: 1 INJECTION, POWDER, FOR SOLUTION INTRAVENOUS at 04:35

## 2017-08-03 RX ADMIN — Medication 2.5 ML: at 08:56

## 2017-08-03 RX ADMIN — GANCICLOVIR SODIUM 950 MG: 500 INJECTION, POWDER, LYOPHILIZED, FOR SOLUTION INTRAVENOUS at 21:32

## 2017-08-03 RX ADMIN — LOPERAMIDE HYDROCHLORIDE 4 MG: 2 SOLUTION ORAL at 12:23

## 2017-08-03 RX ADMIN — OXYCODONE HYDROCHLORIDE 5 MG: 5 SOLUTION ORAL at 12:36

## 2017-08-03 ASSESSMENT — PAIN DESCRIPTION - DESCRIPTORS: DESCRIPTORS: ACHING

## 2017-08-03 NOTE — PLAN OF CARE
Problem: Goal Outcome Summary  Goal: Goal Outcome Summary  Outcome: No Change  [id number]: AVSS and temp max of 99.8.  and 196. Oriented to self and place. IJ with one lumen infusing with a TKO for antibiotics. PIV SL. NJ with tube feedings running at 60ml/hr. Complained of pain and received tylenol x1. Urinary incontinence and stooled 1125 ml into bag. Incision is stapled with no new drainage. Poor PO intake.  Told to continue to push for use of IS and Acapella. Used Acapella x8. Pt is on a full liquid nectar diet. Will continue to monitor and notify MD of any changes.

## 2017-08-03 NOTE — PROGRESS NOTES
"[facility]  WO Nurse Inpatient Adult Pressure INJURY (PI) Wound Assessment     Follow up assessment of PU(s) on pt's:  Ileostomy   Follow up assessment of PI on- left ear lobe and Sacrum      Data:   Patient History:      per MD note(s): This is a 51 year old male with complex PMH of [place] cirrhosis s/p Liver transplant Mar 2017.  Was admitted on 2014 with abdominal pain, sepsis, CT at OSH showed \"right colonic wall thickening suggesting colitis\" underwent Ex-Lap and washout for neutropenic colitis, intra-op was noted to have inflammed cecum and ascending colon with murky fluid.  Abdomen was left open at the time and was closed on 2017.  Some concern for CMV colitis with low count CMV viremia/GI PTLD and subsequently underwent colonoscopy on :  No colitis was seen on visualized portions of mucosa.     Current mattress:  AtmosAir, ordered pulsate mattress today  Current pressure relieving devices:  Z-flow, Heel lift boots, Foam dressing and Pillows    Moisture Management:  Incontinence Protocol      Current Diet / Nutrition:       Active Diet Order      Full Liquid Diet Nectar Thickened Liquids (pre-thickened or use instant food thickener)      Kwasi Score  Av.5  Min: 10  Max: 22       Labs:     Recent Labs   Lab Test  17   0546   17   0410  17   2243   17   0316   17   1810   ALBUMIN   --    --   2.4*   --    < >   --    < >   --    HGB  7.7*   < >  9.3*  9.1*   < >  7.1*   < >  7.3*   INR   --    --    --   1.31*   < >  1.80*   < >   --    WBC  4.9   < >  10.0  10.3   < >  0.8*   < >  0.8*   A1C   --    --    --    --    --   Canceled, Test credited   Below Assay Range  NOTIFIED LEONARD ONEILL AT 0538 ON 17 BY CHRISSY     --    --    CRP   --    --    --    --    --    --    --   354.0    < > = values in this interval not displayed.                                                                                                                      "   Pressure Injury Assessment  (location #1):   Sacrum  Wound History:   Pt was transferred from  last week. Had multiple procedures throughout the day on 7/25/17 per RN. Unable to take picture on Monday due to pt being in pain with turning.        7/26/17  8/3/17- Unable to take picture again today as pt wanted to roll on his back    Wound Base: moist pink and part of the wound bed is maroon tissue    Specific Dimensions (length x width x depth, in cm) :   3.5x2.2x0.1cm, area appears to be more diffuse today    Tunneling:  N/A    Undermining: N/A    Palpation of the wound bed:  Firm on bone    Slough appearance:  none    Eschar appearance:  none    Periwound Skin: minimal superficial moist skin      Color: pink    Temperature  normal     Drainage:  Minimal serous         Odor: none    Pain:  painful  Pressure Injury Assessment  (location #2):   Left ear lobe  Wound History:   Noted this injury during assessing the pt for sacral pressure injury last week. Now able to turn his head independently            7/26/17    Wound Base: dry superficial thin scab    Specific Dimensions (length x width x depth, in cm) :   2x0.8x0.0cm    Tunneling:  N/A    Undermining: N/A    Palpation of the wound bed:  none    Slough appearance:  none    Eschar appearance:  none    Periwound Skin: intact     Color: pink    Temperature  normal     Drainage:  none         Odor: none    Pain:  none             Intervention:     Patient's chart evaluated.      Kwasi Interventions:  Current Kwasi Interventions and Care Plan reviewed and updated, appropriate at this time.    Wound was assessed.    Wound Care: was done: per POC mentioned below,    Orders  Written    Supplies  Reviewed    Discussed plan of care with Nurse           Assessment:     Pressure Injury (PI) located on Sacrum: DTPI  left ear lobe- stage 2    Status: wound  Follow up assessment, Symptomatic, Evolving        New ileostomy and Mucus fistula- Unable to initiate teaching yet  "as pt has been confused. Spoke with RN regarding family members. RN to page WOC if family is available today.         Plan:     Nursing to notify the Provider(s) and re-consult the WO Nurse if wound(s) deteriorate(s).  Plan of care for wound located on Sacrum :   Cleanse the area with NS and pat dry.  Apply No sting barrier to periwound skin.  Apply triad paste lightly only on open areas.  Cover wound with Sacral Mepilex (#[id number])  Change dressing Q 3 days or PRN.  Turn and reposition Q 2hrs.  Ensure pt has Adolph-cushion while sitting up in the chair.  Ensure pt is on pulsate mattress.       Plan of care for wound located on left ear lobe: Monitor the area closely.     WO Nurse will return: twice a week  Face to face time: 30 mins  [facility]  WO Nurse Inpatient Ostomy Assessment      Follow up assessment of ostomy and needs for:  Ileostomy and Mucus fistula (Did not change the both pouches are intact today)      Data:   History:    This is a 51 year old male with complex PMH of [place] cirrhosis s/p Liver transplant Mar 2017.  Was admitted on 7/4/2014 with abdominal pain, sepsis, CT at OSH showed \"right colonic wall thickening suggesting colitis\" underwent Ex-Lap and washout for neutropenic colitis, intra-op was noted to have inflammed cecum and ascending colon with murky fluid.  Abdomen was left open at the time and was closed on 7/5/2017.  Some concern for CMV colitis with low count CMV viremia/GI PTLD and subsequently underwent colonoscopy on 7/21:  No colitis was seen on visualized portions of mucosa.  Exam sub-optimal as colon filled with solid stool.  Random biopsies obtained.  On 7/25/2017 pt became more tachycardic, increasing O2 needs and altered, pt was intubated, hypotension responsive to IVFs, worsening kidney function.  Has not required pressors.  CT was obtained which showed a new large heterogeneous right sided retroperitoneal gas and air tracking along duodenum.  No contrast " "extravasation.  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  Despite conservative management with bowel rest and IV abx, pt continued to spike fevers.  Now s/p Exploratory laparotomy, right angelique-colectomy, end ileostomy, mucus fistula, partial omentectomy on 7/26.    Type of ostomy:  Ileostomy and Mucus fistula  Stoma assessment:   ? stoma:  32mm\" , viable, pink, pale, round, moist, edematous and protruberant  ? A bridge in place?: No, 1 RRC in place  ? Stent(s) in place?: Not applicable,   ? Catheter secured? Yes:   Mucocutaneous Junction (MCJ):  intact  Peristomal skin:  intact  Abdominal assessment: soft and distended  ? N/G still in place?:  Yes   Output:  small, watery, green,   I/O last 3 completed shifts:  In: 3351.83 [P.O.:950; I.V.:309.83; NG/GT:592]  Out: 2225 [Urine:450; Stool:1775]  Current pouching system:  Kyara,  two piece, cut to fit and flat and barrier ring with minimal melting around the cutting edges.   Pain:  Complaining of pain during cleansing  ? Is pt still on a PCA? No    Diet:         Active Diet Order      Full Liquid Diet Nectar Thickened Liquids (pre-thickened or use instant food thickener)            Intervention:     Patient's chart evaluated.      Assessments done today:  Stoma, peristomal skin, and learning needs  ? Participants: pt only. He is not appropriate for teaching yet, confused.  ?  Educational intervention: method: Replaced pouch using below mentioned supplies and ordered supplies for next pouch change  Prepared for discharge by: No discharge preparations started,          Assessment:     Stoma assessment: viable and healthy    Complications: None    Learning needs/ comprehension: Will plan on initiating teaching when able.        Is pt able to demonstrate: 1. how to empty their pouch?  No:   2. How to read and write down correct I and O's?   No:     Effectiveness of current pouching/ supply plan: > 72 hrs         Plan:     Plan:   ? Current pouching " system: Stratton 57 mm  2 pc flat kyara high output pouch with barrier ring    Education:  ? Educational needs: Use of tape, How to empty pouch, Removal of pouch, Preparation of new pouch, Cutting out or evaluating a pattern, Applying paste or rings, Applying appliance to abdomen, Peristomal skin care, Diet and hydration / fluid balance, Odor / flatus management and Lifestyle Adjustments    ? Continue to prepare for discharge:  Supplies ordered, Completed supply list, Registered for samples from , Prescriptions or note left on chart for MD to sign/complete, Prepared for discharge to home with homecare, Discussed making a WO Nurse outpatient appointment upon discharge, Discussed when to follow up with a WO Nurse in the future and Discussed how/ where to order supplies   ? Do WO Nurse recommend home care?  yes  Plan for ileostomy and Mucus fistula: RN to change pouch twice a week. [facility] will initiate teaching once pt is stable.  Supplies Needed  Kyara (2pc 57mm)  Wafer- (#[id number])  Pouch- (#[id number])  or  High output pouch - (#[id number]) depending upon output  Barrier ring- (#[id number])     Ileostomy care- Change pouch twice a week  1. Gather all supplies and remove old pouch gently.  2. Cleanse peristomal skin with w arm water and soft wash cloth or gauze and dry thoroughly.  3. Cut the wafer to fit to stoma or use given pattern, apply barrier ring around the cutting surface and apply centering the stoma.  4. Attach the pouch   5. Massage around it for better adherence.     Face to face time: total 40 mins

## 2017-08-03 NOTE — PROGRESS NOTES
[facility]  Transplant Infectious Diseases Inpatient Progress Note      Camacho Bhagat MRN# [id number]   YOB: 1965 Age: 51 year old   Date of Service: 8/02//2017  Transplant: 3/4/2017 (Liver), Postoperative day: 152         Assessment and recommendations:     Recommendations:    - Please reduce ganciclovir dose to 7.5 mg/kg BID (adjust dose to his current kidney function) and keep monitoring CMV PCR weekly  - Continue meropenem/daptomycin/micafungin until 08/07. Consider to take a CT abdomen before stopping antibiotics  - Check weekly EBV PCR  - Consider to re-start PCP prophylaxis with bactrim; if not feasible due to cytopenia, then consider to take G6PD and if result is normal, then start dapsone 50 mg QD  - we will follow    Assessment:    CMV viremia + organ disease  - Patient presented detected low level CMV viremia (detected < 137) on admission and progressive worsening viremia after stopping valcyte prophylaxis   - Re-started valcyte on 07/15 with 4225 IU (3.6 log) and switched to GCV on 07/20 with 1906 IU (3.3). After 1 week of GCV treatment, CMV PCR dropped to 290 (2.5 log), which represents +/- 1 log less that the previous reading.  - Of note, CMV immunostain was positive from the colonic tissue confirming the etiology of the colitis.  - CMV PCR form today resulted < 137 (< 2.1 log), which is more than 1 log drop in 2 weeks. At this point, unclear if patient really has a CMV resistance.  - Therefore, we recommend to reduce GCV dose to 7.5 mg/kg BID for now. Please adjust doses to his current kidney function.   - Keep monitoring CMV PCR weekly.      Intermittent fever + encephalopathy secondary to CMV viremia overimposed infection?  - Patient has a prolonged hospital course with recurrent fever and encephalopathy despite broad spectrum antibiotics.  - Blood cultures has been negative so far and only colonizer elvie was found in previous BALs  - Currently, patient  has labored breathing and abdominal pain, so these are other possible sources of infection.   - Of note, CMV viremia is improving and fever seems to be of less grade in the last days, but viral infection may not fully explain current patient symptoms.  - Patient underwent a surgery +/- 2 weeks ago and current abdominal symptoms + fever pattern may be related to any residual abscess secondary to leak.  - Also, CXR shows parahiliar infiltrates with few opacities mainly in the right lung, reported as atelectasis, edema or infection. Of note, patient is breathing in room air with normal O2 Sats and no reporting SOB or cough.  - PRO BNP resulted mildly elevated and procalcitonin is high, but we don't have a previous level to compare.  - Today, patient is more alert than yesterday and breathing seems to be improved. No reports of fever overnight.  - Therefore, we recommend to continue with the established treatment with meropenem/daptomycin/micafungin until 08/07. Please consider to take a CT abdomen before stopping antibiotic treatment.    EBV viremia, improving  - PAtient developed EBV viremia, intially of [id number]  - EBV PCR taken on 08/01 showed improvement (6864)  - Please keep monitoring weekly    Multifactorial pancytopenia, improving  - Likely related to medication and CMV viremia  - Currently without neutropenia    S/p liver transplant  - continue with tacrolimus; hold MMF in the context of sepsis and pancytopenia  - Currently, pancytopenia has improved, so consider to re-start bactrim prophylaxis. If cytopenia worsens, then take a G6PD and if result is normal, start PCP prophylaxis with dapsone 50 mg QD.  - we will follow      PCP prophylaxis: stopped due to pancytopenia  Serostatus:  CMV D+/R-, EBV D+/R-  Isolation: none    Thank you for allowing us to participate in his care and call us if any question arises.    Case discussed with Dr. Katey Norris MD   PGY-1  P: [phone]    [facility]  course   Mr. 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 Capitis considered a contaminant. Valcyte was stopped since admission. On 07/06, flagyl + vancomycin + micafungin were stopped and zosyn changed to ertapenem. As per ID note from 07/06, recommended to switch back ertapenem to zosyn since thrombocytopenia was seen before zosyn treatment, pancytopenia possibly related to medication (MMF, bactrim, valcyte, initially seen at the beginning of June) and recommended to monitor CMV PCR weekly ( on 07/03: positive < 137; before on 06/26 was ND). Additionally, on 07/12 a BAL was repeated and showed VRE/CONS/P putida/C. Albicans, all were considered a colonizers, and primary team continued with ertapenem, held MMF, bactrim, valcyte). On 07/13, it was recommended to re-start valcyte since his CMV PCR was 145 and counts improved. Patient persisted febrile, with evident ascitis tapped but culture resulted negative. Course complicated with thrombosis of the right arterial artery with ischemia to the tip of the ischemic finger. On 07/15, BAL was repeated and was positive for rhinovirus. Valcyte was re-initiated on 07/15 due to CMV PCR: 4225. PAtient was discharged from the ICU on 07/17. Also, 14 days of ertapenem were completed on 07/18 and EBV PCR was taken on 07/18 and resulted [id number] concerning of PTLD since patient persisted febrile with ascitis and colitis. On  07/20, ZAKIA was switched to GCV due to worsening level of 1906. Patient became encephalopathic but MRI resulted negative. On 07/21 a colonoscopy was performed and showed normal colonic mucosa; random biopsies were taken. On 07/25, patient deteriorated clinically presenting respiratory failure requiring intubation, so was transferred to ICU and started empiric treatment with meropenem + daptomycin + micafungin, and GCV dose was increased to 10 mg BID. A CT abdomen was repeated and showed retroperitoneal air so underwent EL + lysis of adhesions + right hemicolectomy + ileostomy + partial omentectomy  due to ascending colitis (no neida perforation or ischemia). On 07/27 CMV PCR resulted 290. Patient persists with intermittent fever and encephalopathy.    Subjective: Patient is more alert today, but persists disoriented. No reports of fever or hypotension overnight.    Current Scheduled Medications:    insulin isophane human  25 Units Subcutaneous QAM     loperamide  2-4 mg Oral or Feeding Tube 4x Daily     tacrolimus  6 mg Oral BID IS     diphenoxylate-atropine  2.5 mL Oral BID     insulin glargine  42 Units Subcutaneous Q24H     insulin aspart  1-12 Units Subcutaneous Q4H     fiber modular  1 packet Per Feeding Tube TID     ganciclovir (CYTOVENE) intermittent infusion  10 mg/kg (Dosing Weight) Intravenous Q12H     insulin aspart   Subcutaneous TID w/meals     protein modular  1 packet Per Feeding Tube TID     aspirin  80 mg Oral Daily     multivitamins with minerals  15 mL Per Feeding Tube Daily     heparin  5,000 Units Subcutaneous Q8H     pantoprazole  40 mg Oral or Feeding Tube Daily     sodium chloride (PF)  3 mL Intracatheter Q8H     Physical Examination:  Vital sign ranges over the past 24 hours:  Temp:  [98.2  F (36.8  C)-99.7  F (37.6  C)] 99.7  F (37.6  C)  Pulse:  [] 97  Heart Rate:  [] 100  Resp:  [16-18] 16  BP: (139-162)/(81-92) 145/92  SpO2:  [95 %-99 %] 95 %    Vitals:    07/27/17 0400  07/29/17 0000 07/30/17 1332 08/01/17 1124   Weight: 99 kg (218 lb 4.1 oz) 98.2 kg (216 lb 7.9 oz) 94.4 kg (208 lb 1.6 oz) 91.4 kg (201 lb 6.4 oz)    08/03/17 0812   Weight: 89 kg (196 lb 1.6 oz)     Intake/Output Summary (Last 24 hours) at 07/31/17 1615  Last data filed at 07/31/17 1607   Gross per 24 hour   Intake             2000 ml   Output             2180 ml   Net             -180 ml     Physical Examination:  GENERAL:  Awake, responsive, mild labored breathing  EYES:  EOMI, PERRL, anicteric sclerae.  ENT:  No otorrhea.  NJ tube present.  No nasal cannula present.  No anterior oral lesion.  NECK:  Supple. CVC RIJ without surrounding erythema or discharge  LUNGS:  Few bilaterally scattered coarse rhonchi.  CARDIOVASCULAR:  Regular rate and rhythm, tachycardic, normal S1, S2, without murmur, gallop, or rub.  ABDOMEN:  Normal bowel sounds, soft, diffuse tenderness on deep palpation, midline wound is intact without inflammation with proximal mucous fistula, RLQ ileostomy present.  EXTREMITIES:  Distally warm, no edema,  ischemic right hallux and right second toe, also ischemic tip of left second toe, and right index, no ulcers.  Peripheral left upper forearm without surrounding erythema or drainage.  NEUROLOGIC:  Awake, confused, moves extremities x 4, no meningeal signs.    Inflammatory Markers    Recent Labs   Lab Test  07/05/17   1810  03/05/17   0358  11/23/16   0813  11/16/16   0706  11/15/16   1039  11/07/16   0759  11/03/16   0949  11/01/16   0853   08/15/11   1151  04/11/11   1209  01/03/11   1246  02/15/10   1232   SED   --    --   45*   --    --    --    --    --    --   11  14  17*  25*   CRP  354.0  20.0*  58.0*  59.1*  49.8*  66.0*  140.0*  150.0*   < >  11.1*  9.0*  11.7*  17.5*    < > = values in this interval not displayed.     Immune Globulin Studies     Recent Labs   Lab Test  07/24/17   0705  08/15/11   1243   IGG  1660*  1160   IGG1   --   734   IGG2   --   294   IGG3   --   7*   IGG4   --   59      Metabolic Studies       Recent Labs   Lab Test  08/03/17   1705  08/03/17   0533  08/02/17   1728  08/02/17   0543  08/01/17   1705  08/01/17   0651   07/31/17   1229   07/30/17   0826   07/25/17   0945   07/06/17   0316   NA  145*  145*  147*  150*  148*  150*   < >   --    < >   --    < >  137   < >  143   POTASSIUM  5.0  4.8  4.8  4.5  4.4  4.3   < >   --    < >   --    < >  4.0   < >  5.0   CHLORIDE  120*  119*  122*  123*  122*  123*   < >   --    < >   --    < >  104   < >  116*   CO2  18*  20  19*  21  22  20   < >   --    < >   --    < >  26   < >  18*   ANIONGAP  6  6  6  6  5  7   < >   --    < >   --    < >  7   < >  9   BUN  50*  53*  47*  49*  52*  51*   < >   --    < >   --    < >  77*   < >  62*   CR  0.91  0.90  0.90  0.91  0.90  0.90   < >   --    < >   --    < >  2.60*   < >  2.75*   GFRESTIMATED  87  88  88  87  88  89   < >   --    < >   --    < >  26*   < >  24*   GLC  124*  163*  166*  112*  200*  141*   < >   --    < >   --    < >  382*   < >  139*   A1C   --    --    --    --    --    --    --    --    --    --    --    --    --   Canceled, Test credited   Below Assay Range  NOTIFIED LEONARD ONEILL AT 0538 ON 7/6/17 BY CHRISSY     JACOB  8.0*  8.2*  7.8*  8.0*  7.9*  8.1*   < >   --    < >   --    < >  7.6*   < >  6.9*   PHOS   --   3.6   --   3.2   --   3.1   < >   --    < >   --    < >   --    < >  5.5*   MAG   --   1.8   --   2.0   --   1.9   < >   --    < >   --    < >   --    < >  2.1   LACT   --    --    --    --    --    --    --   1.0   --   0.6*   < >   --    < >  1.5   CKT   --    --    --    --    --   16*   --    --    --    --    --   40   --    --     < > = values in this interval not displayed.     Hepatic Studies    Recent Labs   Lab Test  08/02/17   0543  07/27/17   0410  07/25/17   1651  07/25/17   0945  07/25/17   0017  07/24/17   0705  07/23/17   0625   07/11/17   0328   07/05/17   1810   BILITOTAL  0.4  1.3  0.6  0.5   --   0.4  0.3   < >  0.5   < >   --    ALKPHOS  199*   143  242*  317*   --   264*  224*   < >  158*   < >   --    ALBUMIN  2.3*  2.4*  2.0*  1.9*   --   1.7*  1.9*   < >  1.8*   < >   --    AST  62*  27  61*  90*   --   86*  63*   < >  34   < >   --    ALT  42  27  50  60   --   54  47   < >  27   < >   --    LDH   --    --    --    --   258*   --    --    --    --    --   289*   GGT   --    --    --    --    --    --    --    --   58   --    --     < > = values in this interval not displayed.     Pancreatitis testing    Recent Labs   Lab Test  07/24/17   0705  07/17/17   0630  07/12/17   0342  07/10/17   0340  07/05/17   0346  03/05/17   0358  03/03/17   1458  02/21/17   1520  12/09/16   1159  02/08/16   1144   09/30/10   1734   AMYLASE   --    --    --    --    --   53  70   --    --    --    --   82   LIPASE   --    --    --    --    --   216   --   326  161   --    --   138   TRIG  303*  168*  114  177*  174*   --    --    --    --   99   < >   --     < > = values in this interval not displayed.     Hematology Studies      Recent Labs   Lab Test  08/03/17   0533  08/02/17   0543  08/01/17   0651  07/31/17   0546  07/30/17   0605  07/29/17   0336   WBC  5.1  5.0  4.7  4.9  5.2  6.3   ANEU  2.8  2.7  2.5  2.8  3.2  4.4   ALYM  1.9  2.0  1.8  1.7  1.6  1.5   ZINA  0.3  0.2  0.2  0.3  0.3  0.2   AEOS  0.0  0.1  0.1  0.1  0.1  0.1   HGB  7.3*  7.6*  7.9*  7.7*  8.2*  8.1*   HCT  23.5*  24.4*  24.8*  24.6*  25.8*  24.8*   PLT  92*  90*  99*  105*  125*  121*     Arterial Blood Gas Testing    Recent Labs   Lab Test  08/02/17   1216  07/26/17   2243  07/26/17   2133  07/26/17   2046  07/26/17   2017  07/25/17   1746  07/25/17   1037   PH  7.37  Incorrect specimen type  NOTTIED BY WOJCIECH DEWITT, NEW ORDER TO REPLACE.7/26/17 AT 2346 BY ZAINAB.  CORRECTED ON 07/26 AT 2350: PREVIOUSLY REPORTED AS 7.27     --    --   Canceled, Test credited   Incorrect specimen type    7.32*  7.14*   PCO2  31*  Incorrect specimen type  NOTTIED BY WOJCIECH DEWITT, NEW ORDER TO REPLACE.7/26/17 AT 2346  BY ZAINAB.  CORRECTED ON 07/26 AT 2350: PREVIOUSLY REPORTED AS 45     --    --   Canceled, Test credited   Incorrect specimen type    42  69*   PO2  69*  Incorrect specimen type  NOTTIED BY WOJCIECH DEWITT, NEW ORDER TO REPLACE.7/26/17 AT 2346 BY ZAINAB.  CORRECTED ON 07/26 AT 2350: PREVIOUSLY REPORTED AS 53     --    --   Canceled, Test credited   Incorrect specimen type    81  103   HCO3  18*  Incorrect specimen type  NOTTIED BY WOJCIECH DEWITT, NEW ORDER TO REPLACE.7/26/17 AT 2346 BY ZAINAB.  CORRECTED ON 07/26 AT 2350: PREVIOUSLY REPORTED AS 20     --    --   Canceled, Test credited   Incorrect specimen type    22  23   O2PER  21  Incorrect specimen type  NOTTIED BY WOJCIECH DEWITT, NEW ORDER TO REPLACE.7/26/17 AT 2346 BY ZAINAB.  CORRECTED ON 07/26 AT 2350: PREVIOUSLY REPORTED AS 40    40  62  100  100.0  100  40.0  7L     Urine Studies     Recent Labs   Lab Test  07/28/17   1042  07/25/17   1205  07/19/17   1150  07/11/17   1105  07/06/17   1441   URINEPH  5.0  5.0  5.0  5.0  5.0   NITRITE  Negative  Negative  Negative  Negative  Negative   LEUKEST  Negative  Trace*  Negative  Negative  Trace*   WBCU  6*  5*  2  <1  9*     Vancomycin Levels     Recent Labs   Lab Test  07/06/17   0316  10/28/16   1405   VANCOMYCIN  22.1  14.4       Component Value Flag Ref Range Units Status Collected Lab      Procalcitonin 0.78   ng/ml Final 08/02/2017  5:28 PM 51         Microbiology:  Ascites  7/26/2017 (OR) negative to date.   Gram stain:  NOS, few WBCs.    7/25/2017 negative to date.  Gram stain:  NOS, no WBCs.    7/5/2017 S capitis    BAL   7/15/2017 yeast and Rhinovirus     7/12/2017 single colony of VRE, C albicans/dubliniensis   Sputum  7/29/2017 Normal elvie, heavy C albicans    7/11/2017 VRE and Coag Neg Staph     Bcxs:    7/31 x 2, 7/28 x 2, 7/25 x 2, 7/15 NGTD    Ur cxs  7/25 negative        CMV viral loads    Recent Labs   Lab Test  08/03/17   0533  07/27/17   1109  07/20/17   1427  07/18/17   0530  07/15/17   1553   [facility]  EDTA  PLASMA  EDTA PLASMA  CORRECTED ON 07/28 AT 0840: PREVIOUSLY REPORTED AS Blood    Plasma  Plasma  Bronchoalveolar Lavage   CMVLOG  <2.1  2.5*  3.3*  3.0*  3.6*     CMV viral loads    Log IU/mL of CMVQNT   Date Value Ref Range Status   08/03/2017 <2.1 <2.1 [Log_IU]/mL Final   07/27/2017 2.5 (H) <2.1 [Log_IU]/mL Final   07/20/2017 3.3 (H) <2.1 [Log_IU]/mL Final   07/18/2017 3.0 (H) <2.1 [Log_IU]/mL Final   07/15/2017 3.6 (H) <2.1 [Log_IU]/mL Final   07/10/2017 2.2 (H) <2.1 [Log_IU]/mL Final   07/03/2017 <2.1 <2.1 [Log_IU]/mL Final   06/26/2017 Not Calculated <2.1 [Log_IU]/mL Final     CMV resistance testing    EBV DNA Copies/mL   Date Value Ref Range Status   08/01/2017 6864 (A) EBVNEG [Copies]/mL Final   07/18/2017 894719 (A) EBVNEG [Copies]/mL Final     Pathology   Colectomy path 7/26/17  - Colonic wall with perforation, edema, and acute serositis   - Background colonic mucosa with no significant histologic abnormality   - CMV immunostain is positive in rare (3) cells   - Terminal ileum with no significant histologic abnormality   - Viable, unremarkable surgical margins   - One benign lymph node (0/1)   - Appendix with fibrous obliteration of the tip     Colon biopsies 7/21/2017   A: Colon biopsy, ascending   B: Colon biopsy, descending   FINAL DIAGNOSIS:   A. COLON BIOPSY, ASCENDING:   - Colonic mucosa with no significant histologic abnormality   - Negative for intraepithelial lymphocytosis or deposition of   subepithelial thick collagenous band   B. COLON BIOPSY, DESCENDING:   - Crypt architecture distortion, consistent with healed prior injury   - Negative for active inflammation or dysplasia   - Negative for intraepithelial lymphocytosis or deposition of   subepithelial thick collagenous band     Cytology of ascitic fluid 7/25/2017   PERITONEAL FLUID, ASCITES:   -Negative for malignancy   Cytology of ascitic fluid 7/14/2017.   Peritoneal fluid, ascites:   - Negative for malignancy   - Acute and chronic  inflammation present   Specimen Adequacy: Satisfactory for evaluation.   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   INTERPRETATION:   Ascites fluid:        Rare to absent viable B cells     COMMENT:   This specimen was collected on 7/14/17 but was not ordered/delivered to   lab/run until 7/18/17 - results should be interpreted with caution due   to low cellularity/viability.     Due to limited cellularity we were only able to analyze the B cells.   There is no immunophenotypic evidence of B cell non-Hodgkin lymphoma.   Neoplastic cells, including large cells, may not survive specimen   processing. This sample may not be representative. Final interpretation   requires correlation with morphologic and clinical features.     Imaging:  CXR 8/02/17  1. Decreased lung volumes with mildly increased perihilar and  bibasilar opacities, which may represent pulmonary edema, atelectasis,  or infection.  2. Small right pleural effusion.      CXR 7/31/2017  1. Improving perihilar and bibasilar opacities likely represent  infection/aspiration, atelectasis, or pulmonary edema.  Moderate  opacities bilaterally persist.   2. Stable small to moderate pleural effusions bilaterally.    CXR 7/28/2017  1.  Interval removal of the endotracheal tube.  2.  Increased perihilar opacities, worsening infection versus  pulmonary edema.   3.  Increased bilateral moderate pleural effusions with overlying  atelectasis/consolidation.     CT c/a/p 7/25/2017  1. New large heterogeneous area of right-sided retroperitoneal gas  which is highly concerning for an infectious process. Given the  history of prior neutropenic colitis and biopsies, there could also be  a component of stool from a ruptured viscus, despite the lack of  surrounding bowel loops and no extraluminal oral contrast. Surgical  consultation is recommended.   2. Bibasilar atelectasis versus consolidation with small bilateral  pleural effusions.  3. Extensive mesenteric and soft tissue  edema with large volume  ascites. Numerous mesenteric and retroperitoneal lymph nodes which are  presumably reactive.  4. Postsurgical changes of liver transplant.    CT c/a/p7/13/2017  1. Improved moderate right-sided pleural effusion and resolution of  left-sided pleural effusion. There remain large areas of  atelectasis/consolidation in both lungs, including in the left lower  lobe despite resolution of left-sided pleural effusion. Superimposed  infectious process cannot be excluded.  2. Moderate-large amount of ascites increased from 7/8/2017, which  makes it difficult to evaluate for the presence or absence of  inflammatory change or infectious process in the abdomen or pelvis. No  intra-abdominal abscess.  3. Stable small presumed hematoma within the ventral abdominal wall  fat.  4. Splenomegaly.    MRI brain 7/20/2017  1. Significant image degradation due to motion artifact, with no  definite acute intracranial pathology. No abnormal contrast enhancing  intracranial lesions.  2. Mucosal thickening in the sphenoid and maxillary sinuses and left  greater than right mastoid fluid are nonspecific in the setting of  recent intubation.

## 2017-08-03 NOTE — PLAN OF CARE
Problem: Goal Outcome Summary  Goal: Goal Outcome Summary  Outcome: No Change  Neuro: Alert, orientation waxing and waning over the shift. More confused as the night went on. Disoriented x4 currently   Cardiac: Afebrile, VSS, BP trends high, refused 0400 check.   Respiratory: RA- encouraged pulmonary toilet   GI/: Voiding spontaneously, incontinent of urine. Ostomy intact with large liquid output to vazquez bag.   Diet/appetite: Tolerating full liquid diet- nectar thick liquids. Denies nausea. TF infusing at 60ml/hr thru NJ tube. Blood sugar checks q4h  Activity: Up with lift. Turns q2h, pt declined a few times overnight and will yell when turned   Pain: .Generalized pain- Oxycodone PRN   Skin: Intact, no new deficits noted. Incision stapled, intact and open to air. One lap site stapled, intact and open to air   Lines: CVC infusing TKO with intermittent antibiotics   Drains: Ostomy, and mucous fissure with ostomy bag covering, intact with no output        Pt has been restless throughout night, has pulled off his ostomy bags and brief once, redirected easily. Will continue to monitor and follow plan of care.

## 2017-08-03 NOTE — PROGRESS NOTES
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  Now s/p ex lap with R hemicolectomy, end ileostomy, mucus fistula, partial omenectomy on 7/26.    Glucose controlled to mostly mid 100s since last evening.    Plan:  -increase NPH to 25 units qAM  -continue glargine 42 units q24 h PM  -meal aspart 1unit/7g CHO ordered for meals and snacks  -aspart high correction q4h     Please notify diabetes team of changes planned for nutrition support schedule.     Outpatient diabetes follow up: Dr. Eckert at [facility]             Interval History:   The last 24 hours progress and nursing notes reviewed.  Discussed with transplant and bedside RN.  Camacho was agitated with repositioning this morning.  Wanting mostly fluids per RN.  Needs them thickened, however.  Nepro at 60 cc/h continuous continues.  Even on days with very little oral intake and/or carbs covered with aspart, daytime insulin need trending higher than overnight/early AM.  Creatinine has been <1 since Tuesday.  Transplant notes pt could be ready for TCU in a few days.      Recent Labs  Lab 08/03/17  1140 08/03/17  0810 08/03/17  0533 08/03/17  0441 08/02/17  2340 08/02/17  1956 08/02/17  1728 08/02/17  1530  08/02/17  0543  08/01/17  1705  08/01/17  0651  07/31/17  1806   GLC  --   --  163*  --   --   --  166*  --   --  112*  --  200*  --  141*  --  210*   * 165*  --  139* 164* 196*  --  160*  < >  --   < >  --   < >  --   < >  --    < > = values in this interval not displayed.            Review of Systems:   See interval hx          Medications:   PTA taking Januvia and glargine versus glargine and aspart  per carb    Active Diet Order      Full Liquid Diet Nectar Thickened Liquids (pre-thickened or use instant food thickener)     Physical Exam:  Gen:  resting in bed, in NAD.    HEENT: NC/AT,  NJ feeding tube  Resp: unlabored at rest  Neuro: awake, answers some questions approp, but not cooperating with many requests    /81  Pulse 103  Temp 98.5  F (36.9  C) (Oral)  Resp 16  Ht 1.829 m (6')  Wt 89 kg (196 lb 1.6 oz)  SpO2 97%  BMI 26.6 kg/m2           Data:     Lab Results   Component Value Date    A1C  07/06/2017     Canceled, Test credited   Below Assay Range  NOTIFIED LEONARD ONEILL AT 0538 ON 7/6/17 BY CHRISSY      A1C 9.4 02/16/2017    A1C 6.2 12/14/2016    A1C 6.1 10/27/2016    A1C 8.3 07/02/2012            Recent Labs   Lab Test  08/03/17   0533  08/02/17   1728   NA  145*  147*   POTASSIUM  4.8  4.8   CHLORIDE  119*  122*   CO2  20  19*   ANIONGAP  6  6   GLC  163*  166*   BUN  53*  47*   CR  0.90  0.90   JACOB  8.2*  7.8*     CBC RESULTS:   Recent Labs   Lab Test  08/03/17   0533   WBC  5.1   RBC  2.65*   HGB  7.3*   HCT  23.5*   MCV  89   MCH  27.5   MCHC  31.1*   RDW  16.8*   PLT  92*     Janice Guzman APRN [facility] [phone]    Diabetes Management job code [id number]

## 2017-08-03 NOTE — PROGRESS NOTES
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 Concern for CMV colitis with low count CMV viremia/GI PTLD and subsequently underwent colonoscopy on 7/21, random biopsies obtained.  On 7/25/2017 patient had respiratory distress, abdominal compartment syndrome and EJ with oliguria. Broad spectrum antibiotics were started.  He was intubated and had a paracentesis with 5L removed. He did not required pressors.  CT was obtained which showed a new large heterogeneous right sided retroperitoneal gas and air tracking along duodenum.  No contrast extravasation.  No intraperitoneal air noted. Colorectal surgery was consulted. Initial conservative management with bowel rest and IV abx. Pt continued to spike fevers despite IV abx.  Now s/p Exploratory laparotomy, right angelique-colectomy, end ileostomy, mucus fistula, partial omentectomy on 7/26.    Graft Function: Good function. LFTs WNL. US liver unremarkable.  Immunosuppression Management:   CellCept discontinued (6/27/17) due to neutropenia.   Prograf goal ~8 due to single IS. Level < 3. Repeat level < 3. Increase to 6 mg BID.   Cardiorespiratory: Acute respiratory distress with hypercapnia.  Acidotic breathing secondary to sepsis. Improved after control of sepsis. Extubated on Friday. O2 supplement PRN, now stable on room air. OOB to chair. Increase incentive spirometry use.   -Shallow breathing. O2 sat > 93% on room air. CXR showing b/l opacities, pulm edema, b/l pleural effusions.  ABG ok. Bumex x 1 yesterday. Check bcx x 2.  Hematology: Pancytopenia on admission, acute on chronic,  secondary to medications (MMF, bactrim, valcyte). Improved with holding medications and neupogen. 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  Continue to hold MMF and bactrim.   -Anemia- Hemoglobin stable. WBC stable. Last blood transfusion on 7/26.   -Cytology ascites fluid, negative for malignancy   GI: Neutropenic colitis. Colonoscopy 7/21. Biopsy results showing resolving injury secondary to possible drug induced vs infectious.   -CT scan abd/pelvis, po contrast, showed a new large heterogeneous right sided retroperitoneal gas and air tracking along duodenum.  No contrast extravasation.  No intraperitoneal air noted. Colorectal surgery consulted.  Continued to spike high grade temperature despite IV antibiotics therefore patient taken to OR. s/p Exploratory laparotomy, right angelique-colectomy, end ileostomy, mucus fistula, partial omentectomy on 7/26. Findings no enida perforation, phlegmon/inflammationright colon. Colon pathology suggested colon perforation, negative for malignancy; CMV stain positive.  WOCN consult for ostomy care.   -Honey thickened liquid diet. TF at goal.   Large output from the iliostomy - Output remains higher than goal. Goal ileostomy output ~ 1200 cc in 24 hrs.   loperamide 2mg q6H. Continue fiber TID.   Start lomotil BID, increase to 2/4 mg QID prn.   Fluid/Electrolytes/Renal: EJ oliguric likely sec to high intraabdominal pressures and ischemic ATN sec to sepsis. Nephrology consulted. No indication for RRT presently. UO good, Creatinine 0.9.  Hypernatremia, free water deficit, high ileostomy output. Ileostomy output now decreased.  Na 150->147->145 with  cc per hour.   Endocrine: DM type 2. Endocrine consulting for glycemic management.  Infectious disease:   -Severe sepsis, right colon phlegmon. Meropenem/daptomycin/micafungin tx x 10 days completed on 8/3. S/p right angelique-colectomy. Path CMV stain +.  Fluid cx x 2 NGTD.  -CMV viremia, CMV colitis  per pathology. CMV D+R-. Continue IV ganciclovir. CMV PCR decreasing now. Follow CMV PCR weekly- next due 8/3, negative.   EBV viremia, repeat EBV PCR  8/1 6864. Monitor q2 weeks.  -7/25 Ucx, Bcx and ascites cx negative to date  -ID consulting.   Neuro: Toxic metabolic encephalopathy secondary to infection, prolonged hospital stay, significant improvement.  Vascular: Right Arterial line clot 2/2 previous arterial line. Vascular consulted. Recommend ASA only. Some evidence of microvascular injury in digits (toes) this is most likely due to injury while patient was on pressor therapy and sepsis. Now with a third toe injury, vascular consulted again. US completed.  ECHO EF 60-65%. 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   Activity: Deconditioned due to prolong hospital course. Daily PT/OT, encourage pt to be OOB to chair. Encourage pulmonary toilet.   Prophylaxis: DVT-Heparin SQ  Disposition: 7A. Will transfer to TCU for rehab once status stable. Possible transfer in a few days.       Medical Decision Making: Medium  Admit [id number] (moderate level decision making)    PILLO/Fellow/Resident Provider: Luiza Wing PA-C    Faculty: Chaparro Anderson M.D.    __________________________________________________________________  Transplant History:.  3/4/2017 DD OLT with Dr. Tran (Liver), Postoperative day: 152     Interval History:   Overnight events: No complaints. Patient confused with A&O questions.     ROS:   A 10-point review of systems was negative except as noted above.    Meds:    insulin isophane human  25 Units Subcutaneous QAM     loperamide  2-4 mg Oral or Feeding Tube 4x Daily     tacrolimus  6 mg Oral BID IS     diphenoxylate-atropine  2.5 mL Oral BID     insulin glargine  42 Units Subcutaneous Q24H     insulin aspart  1-12 Units Subcutaneous Q4H     fiber modular  1 packet Per Feeding Tube TID     ganciclovir (CYTOVENE) intermittent infusion  10 mg/kg (Dosing Weight) Intravenous  Q12H     insulin aspart   Subcutaneous TID w/meals     protein modular  1 packet Per Feeding Tube TID     aspirin  80 mg Oral Daily     multivitamins with minerals  15 mL Per Feeding Tube Daily     heparin  5,000 Units Subcutaneous Q8H     pantoprazole  40 mg Oral or Feeding Tube Daily     sodium chloride (PF)  3 mL Intracatheter Q8H       Physical Exam:     Admit Weight: 94.2 kg (207 lb 11.2 oz) (SCALE 2)    Current vitals:   /81  Pulse 97  Temp 98.5  F (36.9  C) (Oral)  Resp 16  Ht 1.829 m (6')  Wt 89 kg (196 lb 1.6 oz)  SpO2 97%  BMI 26.6 kg/m2    Vital sign ranges:    Temp:  [98.2  F (36.8  C)-98.9  F (37.2  C)] 98.5  F (36.9  C)  Pulse:  [] 97  Heart Rate:  [92-99] 92  Resp:  [16-18] 16  BP: (139-162)/(81-88) 139/81  SpO2:  [97 %-99 %] 97 %  Patient Vitals for the past 24 hrs:   BP Temp Temp src Pulse Heart Rate Resp SpO2 Weight   08/03/17 1456 - - - 97 - - - -   08/03/17 1141 139/81 98.5  F (36.9  C) Oral 103 - 16 97 % -   08/03/17 0812 162/88 98.2  F (36.8  C) Oral 62 - 16 97 % 89 kg (196 lb 1.6 oz)   08/02/17 2344 141/85 98.9  F (37.2  C) Oral - 92 18 97 % -   08/02/17 2000 144/82 98.2  F (36.8  C) Oral 99 99 18 99 % -     General Appearance: NAD  Skin: normal, warm, dry  Heart: RRR  Lungs: B/l decreased bases, shallow breathing.  Abdomen: soft, non distended. Ileostomy with brown output. Mucus fistula, pink. Midline incision, c/d/i. Chevron incision well healed.  : No vazquez.   Extremities: No edema. Ext NT x 4.  Neurologic: A&O x 3.       Data:   CMP    Recent Labs  Lab 08/03/17  0533 08/02/17  1728 08/02/17  0543   * 147* 150*   POTASSIUM 4.8 4.8 4.5   CHLORIDE 119* 122* 123*   CO2 20 19* 21   * 166* 112*   BUN 53* 47* 49*   CR 0.90 0.90 0.91   GFRESTIMATED 88 88 87   GFRESTBLACK >90African American GFR Calc >90African American GFR Calc >90African American GFR Calc   JACOB 8.2* 7.8* 8.0*   MAG 1.8  --  2.0   PHOS 3.6  --  3.2   ALBUMIN  --   --  2.3*   BILITOTAL  --   --   "0.4   ALKPHOS  --   --  199*   AST  --   --  62*   ALT  --   --  42     CBC    Recent Labs  Lab 08/03/17  0533 08/02/17  0543   HGB 7.3* 7.6*   WBC 5.1 5.0   PLT 92* 90*     COAGS  No lab results found in last 7 days.    Invalid input(s): XA   Urinalysis  Recent Labs   Lab Test  07/28/17   1042  07/25/17   1205   06/14/17   1508   04/11/16   1345   COLOR  Yellow  Dark Yellow   < >   --    < >  Yellow   APPEARANCE  Slightly Cloudy  Cloudy   < >   --    < >  Clear   URINEGLC  Negative  70*   < >   --    < >  30*   URINEBILI  Negative  Negative   < >   --    < >  Negative   URINEKETONE  Negative  Negative   < >   --    < >  Negative   SG  1.012  1.014   < >   --    < >  1.016   UBLD  Trace*  Moderate*   < >   --    < >  Small*   URINEPH  5.0  5.0   < >   --    < >  5.0   PROTEIN  30*  100*   < >   --    < >  30*   NITRITE  Negative  Negative   < >   --    < >  Negative   LEUKEST  Negative  Trace*   < >   --    < >  Negative   RBCU  5*  >182*   < >   --    < >  1   WBCU  6*  5*   < >   --    < >  1   UTPG   --    --    --   1.55*   --   0.41*    < > = values in this interval not displayed.          7/21/2017   SPECIMEN(S):   A: Colon biopsy, ascending   B: Colon biopsy, descending     FINAL DIAGNOSIS:   A. COLON BIOPSY, ASCENDING:   - Colonic mucosa with no significant histologic abnormality   - Negative for intraepithelial lymphocytosis or deposition of   subepithelial thick collagenous band     B. COLON BIOPSY, DESCENDING:   - Crypt architecture distortion, consistent with healed prior injury   - Negative for active inflammation or dysplasia   - Negative for intraepithelial lymphocytosis or deposition of   subepithelial thick collagenous band   - Please, see comment     COMMENT:   Specimen B, \"descending colon\" features lamina propria edema, slight   variation in crypt sizes and shapes and occasional crypt drop-out.  This   may indicate a resolving injury which could be drug-induced or   infectious.     CT scan " 7/25/2017:   IMPRESSION:   1. New large heterogeneous area of right-sided retroperitoneal gas  which is highly concerning for an infectious process. Given the  history of prior neutropenic colitis and biopsies, there could also be  a component of stool from a ruptured viscus, despite the lack of  surrounding bowel loops and no extraluminal oral contrast. Surgical  consultation is recommended.      2. Bibasilar atelectasis versus consolidation with small bilateral  pleural effusions.     3. Extensive mesenteric and soft tissue edema with large volume  ascites. Numerous mesenteric and retroperitoneal lymph nodes which are  presumably reactive.     4. Postsurgical changes of liver transplant.     [Urgent Result: Retroperitoneal gas]     Finding was identified on 7/25/2017 5:34 PM.      Dr. Santoyo was contacted by Dr. Atkins at 7/25/2017 5:37 PM and  verbalized understanding of the urgent finding.      I have personally reviewed the examination and initial interpretation  and I agree with the findings.     LUCI HOROWITZ, [facility]

## 2017-08-03 NOTE — PLAN OF CARE
Problem: Goal Outcome Summary  Goal: Goal Outcome Summary  Pt seen for dysphagia therapy. Pt refused any trials other than water. Pt demonstrates improved ability to swallow water without clinical s/sx of aspiration/penetration. Recommend advancing pt to full liquid diet. Sit pt upright for po intake. Encourage small bites/sips and slow rate. SLP to follow per POC.

## 2017-08-03 NOTE — PLAN OF CARE
Problem: Goal Outcome Summary  Goal: Goal Outcome Summary  Outcome: No Change  Max  and pt slightly hypertensive this shift; OVSS on room air. Oxy x 1 for pain.  and 162. Pt on full liquid diet with poor appetite; denies nausea. Pt up assist x 2 and up in chair this afternoon. Voiding and incontinent at times; ostomy with 275 ml out for shift. WOC RN came to bedside and placed new wound care orders for coccyx pressure ulcer. Pt repositioned min. Of q2 hr and also independently repositioning in bed/chair. Pt confused and agitated at times. Bed alarm on and chair alarm on for increased safety. Will continue to monitor and will notify team of changes.

## 2017-08-03 NOTE — PLAN OF CARE
Problem: Goal Outcome Summary  Goal: Goal Outcome Summary  PT-7A: Limited tolerance for therapy this day.  Performed bed mobility and transfers with SBA.  Amb short distances with FWW + WC follow.  Declined additional amb or sitting up in the recliner at completion of session.      Recommend TCU at discharge.

## 2017-08-03 NOTE — PROGRESS NOTES
"Colorectal Surgery Progress Note      Subjective:  Some confusion this morning.  Reporting some abd pain.      Vitals:  Vitals:    08/02/17 1527 08/02/17 2000 08/02/17 2344 08/03/17 0812   BP: 144/79 144/82 141/85 162/88   BP Location: Right arm Right arm Right arm    Pulse: 93 99  62   Resp: 18 18 18 16   Temp: 99.8  F (37.7  C) 98.2  F (36.8  C) 98.9  F (37.2  C) 98.2  F (36.8  C)   TempSrc: Oral Oral Oral Oral   SpO2: 92% 99% 97% 97%   Weight:    89 kg (196 lb 1.6 oz)   Height:         I/O:  I/O last 3 completed shifts:  In: 3351.83 [P.O.:950; I.V.:309.83; NG/GT:592]  Out: 2225 [Urine:450; Stool:1775]    Physical Exam:  Gen: Disoriented but awake and alert  Pulm: Norm resp effort.  NJ feeding tube in place.   Abd: Soft, mildly distended but much less compared to prior   Incision  With staples and c/d/i   Ileostomy - pink, viable, red rubber via stoma, +succus.    Mucus fistula and incision dressed    BMP    Recent Labs  Lab 08/03/17  0533 08/02/17  1728 08/02/17  0543 08/01/17  1705 08/01/17  0651 07/31/17  1806   * 147* 150* 148* 150* 147*   POTASSIUM 4.8 4.8 4.5 4.4 4.3 4.2   CHLORIDE 119* 122* 123* 122* 123* 121*   CO2 20 19* 21 22 20 19*   BUN 53* 47* 49* 52* 51* 54*   CR 0.90 0.90 0.91 0.90 0.90 0.95   * 166* 112* 200* 141* 210*   MAG 1.8  --  2.0  --  1.9 2.0   PHOS 3.6  --  3.2  --  3.1 2.7     CBC    Recent Labs  Lab 08/03/17  0533 08/02/17  0543 08/01/17  0651 07/31/17  0546   WBC 5.1 5.0 4.7 4.9   HGB 7.3* 7.6* 7.9* 7.7*   HCT 23.5* 24.4* 24.8* 24.6*   PLT 92* 90* 99* 105*         ASSESSMENT: This is a 51 year old male with complex PMH of CASTAÑEDA cirrhosis s/p Liver transplant Mar 2017.  Was admitted on 7/4/2014 with abdominal pain, sepsis, CT at OSH showed \"right colonic wall thickening suggesting colitis\" underwent Ex-Lap and washout for neutropenic colitis, intra-op was noted to have inflammed cecum and ascending colon with murky fluid.  Abdomen was left open at the time and was closed on " 7/5/2017.  Some concern for CMV colitis with low count CMV viremia/GI PTLD and subsequently underwent colonoscopy on 7/21:  No colitis was seen on visualized portions of mucosa.  Exam sub-optimal as colon filled with solid stool.  Random biopsies obtained.  On 7/25/2017 pt became more tachycardic, increasing O2 needs and altered, pt was intubated, hypotension responsive to IVFs, worsening kidney function.  Has not required pressors.  CT was obtained which showed a new large heterogeneous right sided retroperitoneal gas and air tracking along duodenum.  No contrast extravasation.  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  Despite conservative management with bowel rest and IV abx, pt continued to spike fevers.  Now s/p Exploratory laparotomy, right angelique-colectomy, end ileostomy, mucus fistula, partial omentectomy on 7/26.  Intra-operatively no perforation noted, inflammation of right colon.  Pathology with colonic wall perforation.  CMV immunostain positive in rare (3) cells.      - no new recs today  - defer management to transplant  - WOCN for new ileostomy & mucus fistula  - With ileostomy, do not recommend bowel regimens (miralax, senna-docusate, dulcolax, etc).    - For high ileostomy outputs, already receiving fiber.  Can slowly titrate imodium to max of 4mg QID.  If continue to have high outputs despite fiber and imodium, can start lomotil 1 tab BID-TID and titrate to max of 2 tabs QID.  After can consider cholestyramine and or opium tincture.  Typically recommend ileostomy output goal of about 1200mL in 24 hours +/- 200mL depending on oral intake.  Defer fluid status to transplant & nephrology.   - defer diet adv to SLP.  But once cleared for solid foods, rec a low residue diet.   - OK for DVT ppx from colorectal perspective    Iqra Acosta PA-C   Colon and Rectal Surgery  [id number]     Patient was seen and discussed with Fellow, Dr. Lynch    Attending addendum:  Seen and examined.  abd is a lot less tender.  Ileostomy functioning- thin bilious liquid  Agree with above recommendations per CASIE Acosta PA-C.   As he has been afebrile now and is tolerating feeds, will sign off. Please call us for questions.  We can see him in clinic when he is recovered.

## 2017-08-04 ENCOUNTER — APPOINTMENT (OUTPATIENT)
Dept: CT IMAGING | Facility: CLINIC | Age: 52
End: 2017-08-04
Attending: TRANSPLANT SURGERY
Payer: COMMERCIAL

## 2017-08-04 ENCOUNTER — APPOINTMENT (OUTPATIENT)
Dept: OCCUPATIONAL THERAPY | Facility: CLINIC | Age: 52
End: 2017-08-04
Attending: TRANSPLANT SURGERY
Payer: COMMERCIAL

## 2017-08-04 ENCOUNTER — APPOINTMENT (OUTPATIENT)
Dept: GENERAL RADIOLOGY | Facility: CLINIC | Age: 52
End: 2017-08-04
Attending: PHYSICIAN ASSISTANT
Payer: COMMERCIAL

## 2017-08-04 ENCOUNTER — APPOINTMENT (OUTPATIENT)
Dept: SPEECH THERAPY | Facility: CLINIC | Age: 52
End: 2017-08-04
Attending: TRANSPLANT SURGERY
Payer: COMMERCIAL

## 2017-08-04 LAB
ANION GAP SERPL CALCULATED.3IONS-SCNC: 6 MMOL/L (ref 3–14)
ANION GAP SERPL CALCULATED.3IONS-SCNC: 8 MMOL/L (ref 3–14)
BASOPHILS # BLD AUTO: 0 10E9/L (ref 0–0.2)
BASOPHILS NFR BLD AUTO: 0.1 %
BUN SERPL-MCNC: 50 MG/DL (ref 7–30)
BUN SERPL-MCNC: 53 MG/DL (ref 7–30)
CALCIUM SERPL-MCNC: 7.9 MG/DL (ref 8.5–10.1)
CALCIUM SERPL-MCNC: 8 MG/DL (ref 8.5–10.1)
CHLORIDE SERPL-SCNC: 113 MMOL/L (ref 94–109)
CHLORIDE SERPL-SCNC: 117 MMOL/L (ref 94–109)
CO2 SERPL-SCNC: 19 MMOL/L (ref 20–32)
CO2 SERPL-SCNC: 20 MMOL/L (ref 20–32)
CREAT SERPL-MCNC: 0.88 MG/DL (ref 0.66–1.25)
CREAT SERPL-MCNC: 0.91 MG/DL (ref 0.66–1.25)
DIFFERENTIAL METHOD BLD: ABNORMAL
EOSINOPHIL # BLD AUTO: 0 10E9/L (ref 0–0.7)
EOSINOPHIL NFR BLD AUTO: 0.6 %
ERYTHROCYTE [DISTWIDTH] IN BLOOD BY AUTOMATED COUNT: 17 % (ref 10–15)
GFR SERPL CREATININE-BSD FRML MDRD: 87 ML/MIN/1.7M2
GFR SERPL CREATININE-BSD FRML MDRD: ABNORMAL ML/MIN/1.7M2
GLUCOSE BLDC GLUCOMTR-MCNC: 102 MG/DL (ref 70–99)
GLUCOSE BLDC GLUCOMTR-MCNC: 104 MG/DL (ref 70–99)
GLUCOSE BLDC GLUCOMTR-MCNC: 124 MG/DL (ref 70–99)
GLUCOSE BLDC GLUCOMTR-MCNC: 133 MG/DL (ref 70–99)
GLUCOSE BLDC GLUCOMTR-MCNC: 140 MG/DL (ref 70–99)
GLUCOSE BLDC GLUCOMTR-MCNC: 61 MG/DL (ref 70–99)
GLUCOSE BLDC GLUCOMTR-MCNC: 64 MG/DL (ref 70–99)
GLUCOSE BLDC GLUCOMTR-MCNC: 68 MG/DL (ref 70–99)
GLUCOSE BLDC GLUCOMTR-MCNC: 73 MG/DL (ref 70–99)
GLUCOSE BLDC GLUCOMTR-MCNC: 88 MG/DL (ref 70–99)
GLUCOSE BLDC GLUCOMTR-MCNC: 96 MG/DL (ref 70–99)
GLUCOSE SERPL-MCNC: 102 MG/DL (ref 70–99)
GLUCOSE SERPL-MCNC: 89 MG/DL (ref 70–99)
HCT VFR BLD AUTO: 24.3 % (ref 40–53)
HGB BLD-MCNC: 7.8 G/DL (ref 13.3–17.7)
IMM GRANULOCYTES # BLD: 0 10E9/L (ref 0–0.4)
IMM GRANULOCYTES NFR BLD: 0.3 %
LYMPHOCYTES # BLD AUTO: 2.5 10E9/L (ref 0.8–5.3)
LYMPHOCYTES NFR BLD AUTO: 36.1 %
MAGNESIUM SERPL-MCNC: 2 MG/DL (ref 1.6–2.3)
MCH RBC QN AUTO: 28.1 PG (ref 26.5–33)
MCHC RBC AUTO-ENTMCNC: 32.1 G/DL (ref 31.5–36.5)
MCV RBC AUTO: 87 FL (ref 78–100)
MONOCYTES # BLD AUTO: 0.3 10E9/L (ref 0–1.3)
MONOCYTES NFR BLD AUTO: 3.6 %
NEUTROPHILS # BLD AUTO: 4.1 10E9/L (ref 1.6–8.3)
NEUTROPHILS NFR BLD AUTO: 59.3 %
NRBC # BLD AUTO: 0 10*3/UL
NRBC BLD AUTO-RTO: 0 /100
PHOSPHATE SERPL-MCNC: 3.7 MG/DL (ref 2.5–4.5)
PLATELET # BLD AUTO: 99 10E9/L (ref 150–450)
POTASSIUM SERPL-SCNC: 5 MMOL/L (ref 3.4–5.3)
POTASSIUM SERPL-SCNC: 5.1 MMOL/L (ref 3.4–5.3)
RBC # BLD AUTO: 2.78 10E12/L (ref 4.4–5.9)
SODIUM SERPL-SCNC: 141 MMOL/L (ref 133–144)
SODIUM SERPL-SCNC: 143 MMOL/L (ref 133–144)
TACROLIMUS BLD-MCNC: ABNORMAL UG/L (ref 5–15)
TME LAST DOSE: ABNORMAL H
WBC # BLD AUTO: 6.9 10E9/L (ref 4–11)

## 2017-08-04 PROCEDURE — 36592 COLLECT BLOOD FROM PICC: CPT | Performed by: TRANSPLANT SURGERY

## 2017-08-04 PROCEDURE — 80197 ASSAY OF TACROLIMUS: CPT | Performed by: PHYSICIAN ASSISTANT

## 2017-08-04 PROCEDURE — 83735 ASSAY OF MAGNESIUM: CPT | Performed by: TRANSPLANT SURGERY

## 2017-08-04 PROCEDURE — 87040 BLOOD CULTURE FOR BACTERIA: CPT | Performed by: PHYSICIAN ASSISTANT

## 2017-08-04 PROCEDURE — 97530 THERAPEUTIC ACTIVITIES: CPT | Mod: GO | Performed by: OCCUPATIONAL THERAPIST

## 2017-08-04 PROCEDURE — 25000125 ZZHC RX 250: Performed by: STUDENT IN AN ORGANIZED HEALTH CARE EDUCATION/TRAINING PROGRAM

## 2017-08-04 PROCEDURE — 25000132 ZZH RX MED GY IP 250 OP 250 PS 637: Performed by: SURGERY

## 2017-08-04 PROCEDURE — 25000128 H RX IP 250 OP 636: Performed by: RADIOLOGY

## 2017-08-04 PROCEDURE — 25000132 ZZH RX MED GY IP 250 OP 250 PS 637: Performed by: STUDENT IN AN ORGANIZED HEALTH CARE EDUCATION/TRAINING PROGRAM

## 2017-08-04 PROCEDURE — 27210432 ZZH NUTRITION PRODUCT RENAL BASIC LITER

## 2017-08-04 PROCEDURE — 74177 CT ABD & PELVIS W/CONTRAST: CPT

## 2017-08-04 PROCEDURE — 25000131 ZZH RX MED GY IP 250 OP 636 PS 637: Performed by: PHYSICIAN ASSISTANT

## 2017-08-04 PROCEDURE — 40000133 ZZH STATISTIC OT WARD VISIT: Performed by: OCCUPATIONAL THERAPIST

## 2017-08-04 PROCEDURE — 80048 BASIC METABOLIC PNL TOTAL CA: CPT | Performed by: TRANSPLANT SURGERY

## 2017-08-04 PROCEDURE — 25000132 ZZH RX MED GY IP 250 OP 250 PS 637: Performed by: PHYSICIAN ASSISTANT

## 2017-08-04 PROCEDURE — 27210913 ZZH NUTRITION PRODUCT INTERMEDIATE PACKET

## 2017-08-04 PROCEDURE — 92610 EVALUATE SWALLOWING FUNCTION: CPT | Mod: GN | Performed by: SPEECH-LANGUAGE PATHOLOGIST

## 2017-08-04 PROCEDURE — 25000128 H RX IP 250 OP 636: Performed by: PHYSICIAN ASSISTANT

## 2017-08-04 PROCEDURE — 25000128 H RX IP 250 OP 636: Performed by: STUDENT IN AN ORGANIZED HEALTH CARE EDUCATION/TRAINING PROGRAM

## 2017-08-04 PROCEDURE — 40000225 ZZH STATISTIC SLP WARD VISIT: Performed by: SPEECH-LANGUAGE PATHOLOGIST

## 2017-08-04 PROCEDURE — 36415 COLL VENOUS BLD VENIPUNCTURE: CPT | Performed by: PHYSICIAN ASSISTANT

## 2017-08-04 PROCEDURE — 25000128 H RX IP 250 OP 636: Performed by: TRANSPLANT SURGERY

## 2017-08-04 PROCEDURE — 25000132 ZZH RX MED GY IP 250 OP 250 PS 637: Performed by: TRANSPLANT SURGERY

## 2017-08-04 PROCEDURE — 00000146 ZZHCL STATISTIC GLUCOSE BY METER IP

## 2017-08-04 PROCEDURE — 92526 ORAL FUNCTION THERAPY: CPT | Mod: GN | Performed by: SPEECH-LANGUAGE PATHOLOGIST

## 2017-08-04 PROCEDURE — 84100 ASSAY OF PHOSPHORUS: CPT | Performed by: TRANSPLANT SURGERY

## 2017-08-04 PROCEDURE — 12000025 ZZH R&B TRANSPLANT INTERMEDIATE

## 2017-08-04 PROCEDURE — 85025 COMPLETE CBC W/AUTO DIFF WBC: CPT | Performed by: TRANSPLANT SURGERY

## 2017-08-04 PROCEDURE — 71010 XR CHEST PORT 1 VW: CPT

## 2017-08-04 RX ORDER — IOPAMIDOL 755 MG/ML
120 INJECTION, SOLUTION INTRAVASCULAR ONCE
Status: COMPLETED | OUTPATIENT
Start: 2017-08-04 | End: 2017-08-04

## 2017-08-04 RX ORDER — SULFAMETHOXAZOLE AND TRIMETHOPRIM 400; 80 MG/1; MG/1
1 TABLET ORAL DAILY
Status: DISCONTINUED | OUTPATIENT
Start: 2017-08-04 | End: 2017-08-11

## 2017-08-04 RX ORDER — LOPERAMIDE HYDROCHLORIDE 1 MG/5ML
4 SOLUTION ORAL 4 TIMES DAILY
Status: DISCONTINUED | OUTPATIENT
Start: 2017-08-04 | End: 2017-08-27

## 2017-08-04 RX ADMIN — Medication 1 PACKET: at 17:23

## 2017-08-04 RX ADMIN — Medication 1 PACKET: at 20:47

## 2017-08-04 RX ADMIN — SULFAMETHOXAZOLE AND TRIMETHOPRIM 1 TABLET: 400; 80 TABLET ORAL at 17:21

## 2017-08-04 RX ADMIN — HEPARIN SODIUM 5000 UNITS: 5000 INJECTION, SOLUTION INTRAVENOUS; SUBCUTANEOUS at 17:22

## 2017-08-04 RX ADMIN — MULTIVIT AND MINERALS-FERROUS GLUCONATE 9 MG IRON/15 ML ORAL LIQUID 15 ML: at 09:43

## 2017-08-04 RX ADMIN — IOPAMIDOL 120 ML: 755 INJECTION, SOLUTION INTRAVENOUS at 19:24

## 2017-08-04 RX ADMIN — Medication 80 MG: at 09:39

## 2017-08-04 RX ADMIN — Medication 2.5 ML: at 09:40

## 2017-08-04 RX ADMIN — Medication 1 PACKET: at 09:46

## 2017-08-04 RX ADMIN — GANCICLOVIR SODIUM 950 MG: 500 INJECTION, POWDER, LYOPHILIZED, FOR SOLUTION INTRAVENOUS at 09:44

## 2017-08-04 RX ADMIN — GANCICLOVIR SODIUM 650 MG: 500 INJECTION, POWDER, LYOPHILIZED, FOR SOLUTION INTRAVENOUS at 20:39

## 2017-08-04 RX ADMIN — TACROLIMUS 6 MG: 5 CAPSULE ORAL at 09:39

## 2017-08-04 RX ADMIN — PANTOPRAZOLE SODIUM 40 MG: 40 TABLET, DELAYED RELEASE ORAL at 09:38

## 2017-08-04 RX ADMIN — Medication 2.5 ML: at 20:36

## 2017-08-04 RX ADMIN — TACROLIMUS 6 MG: 5 CAPSULE ORAL at 18:26

## 2017-08-04 RX ADMIN — HEPARIN SODIUM 5000 UNITS: 5000 INJECTION, SOLUTION INTRAVENOUS; SUBCUTANEOUS at 00:41

## 2017-08-04 RX ADMIN — Medication 2 G: at 11:14

## 2017-08-04 RX ADMIN — LOPERAMIDE HYDROCHLORIDE 4 MG: 2 SOLUTION ORAL at 20:38

## 2017-08-04 RX ADMIN — Medication 1 PACKET: at 09:44

## 2017-08-04 RX ADMIN — LOPERAMIDE HYDROCHLORIDE 4 MG: 2 SOLUTION ORAL at 09:38

## 2017-08-04 RX ADMIN — LOPERAMIDE HYDROCHLORIDE 4 MG: 2 SOLUTION ORAL at 17:38

## 2017-08-04 RX ADMIN — OXYCODONE HYDROCHLORIDE 10 MG: 5 SOLUTION ORAL at 04:28

## 2017-08-04 RX ADMIN — HEPARIN SODIUM 5000 UNITS: 5000 INJECTION, SOLUTION INTRAVENOUS; SUBCUTANEOUS at 09:43

## 2017-08-04 NOTE — PLAN OF CARE
Problem: Goal Outcome Summary  Goal: Goal Outcome Summary     OT-7a: Pt needing extensive encouragement and education for participation this session, c/o ongoing fatigue, nausea, and abdominal pain. Very impulsive with movement, with poor command following. Mod A supine>sit, CGA sit>stand, CGA transfer bed>recliner with FWW. C/o pain at coccyx sitting on pillow in recliner, and needing to return to supine. Rec d/c to TCU.

## 2017-08-04 NOTE — PLAN OF CARE
Problem: Goal Outcome Summary  Goal: Goal Outcome Summary  Outcome: No Change  AVSS and afebrile.  and 118. Pt complained of pain with activity and received Tylenol x1. Magnesium was replaced and recheck in the AM. NJ tube with tube feeds running at 60ml/hr with 100ml/hr flushes. Ostomy output 325ml of loose stool. Pt oriented to self and place. Moments of clarity throughout the shift. Pt incision is stapled with no drainage. Wife called about why patient isn't being treated for encephalopathy. Provider notified and consult. Will continue to monitor and follow plan of care.

## 2017-08-04 NOTE — PROGRESS NOTES
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  Now s/p ex lap with R hemicolectomy, end ileostomy, mucus fistula, partial omenectomy on 7/26.    Glucose trending lower overnight, then dropped <70 this morning with small leakage of TFs around 0730.    Plan:  - NPH decreased from 25 to 20 units qAM  -glargine decreased from 42 to 36 units qPM  -meal aspart 1unit/7g CHO ordered for meals and snacks  -aspart high correction q4h     Please notify diabetes team of changes planned for nutrition support schedule.     Outpatient diabetes follow up: Dr. Eckert at [facility]             Interval History:   The last 24 hours progress and nursing notes reviewed.  Discussed with transplant and bedside RN.  Ruth at 60 cc/h continuous continues. Diet: honey thickened liquids.  Camacho was sleeping soundly when I visited.  He had fever to 100.5 and tachycardia in the early morning- blood cx and UA/UC pending.  Glucose dropped overnight-down to 68 by this morning.  Discussed with day RN- there was a small leak of TFs this morning, but described as only minimal leakage of TFs.  It appears that glucoses were already trending lower overnight, and then small leak in TFs dropped the glucose to <70.  BG now in 120s midday with some insulin reductions.   Creatinine remains <1.  Transplant notes pt could be ready for TCU in a few days.      Recent Labs  Lab 08/04/17  1313 08/04/17  0905 08/04/17  0842 08/04/17  0817 08/04/17  0548 08/04/17  0428 08/04/17  0034  08/03/17  1705  08/03/17  0533  08/02/17  1728  08/02/17  0543  08/01/17  1705   GLC  --   --   --   --  89  --   --   --  124*  --   163*  --  166*  --  112*  --  200*   * 102* 88 68*  --  96 140*  < >  --   < >  --   < >  --   < >  --   < >  --    < > = values in this interval not displayed.            Review of Systems:   See interval hx          Medications:   PTA taking Januvia and glargine versus glargine and aspart per carb    Active Diet Order      Full Liquid Diet Nectar Thickened Liquids (pre-thickened or use instant food thickener)     Physical Exam:  Gen:  Sleeping soundly in recliner    HEENT: NC/AT,  NJ feeding tube  Resp: unlabored at rest  Neuro: sleeping    BP (!) 144/98 (BP Location: Right arm)  Pulse 102  Temp 98.7  F (37.1  C) (Oral)  Resp 18  Ht 1.829 m (6')  Wt 89 kg (196 lb 1.6 oz)  SpO2 96%  BMI 26.6 kg/m2           Data:     Lab Results   Component Value Date    A1C  07/06/2017     Canceled, Test credited   Below Assay Range  NOTIFIED LEONARD ONEILL AT 0538 ON 7/6/17 BY CHRISSY      A1C 9.4 02/16/2017    A1C 6.2 12/14/2016    A1C 6.1 10/27/2016    A1C 8.3 07/02/2012            Recent Labs   Lab Test  08/04/17   0548  08/03/17   1705   NA  143  145*   POTASSIUM  5.0  5.0   CHLORIDE  117*  120*   CO2  20  18*   ANIONGAP  6  6   GLC  89  124*   BUN  53*  50*   CR  0.91  0.91   JACOB  7.9*  8.0*     CBC RESULTS:   Recent Labs   Lab Test  08/04/17   0548   WBC  6.9   RBC  2.78*   HGB  7.8*   HCT  24.3*   MCV  87   MCH  28.1   MCHC  32.1   RDW  17.0*   PLT  99*     Giovana Villasenor PA-C [phone]  Diabetes Management job code [id number]

## 2017-08-04 NOTE — PLAN OF CARE
Problem: Goal Outcome Summary  Goal: Goal Outcome Summary  PT: Attempted PT at scheduled time today but pt unavailable as with staff for procedure. Will reschedule.

## 2017-08-04 NOTE — PROGRESS NOTES
[facility]  Transplant Infectious Diseases Inpatient Progress Note      Camacho Bhagat MRN# [id number]   YOB: 1965 Age: 51 year old   Date of Service: 8/02//2017  Transplant: 3/4/2017 (Liver), Postoperative day: 153         Assessment and recommendations:     Recommendations:    - Please take another procalcitonin, CRP every 48 hours and consider to take an abdominal CT to rule out any collection. Consider to re-start broad spectrum antimicrobial coverage if patient deteriorates clinically.  - Continue with ganciclovir 7.5 mg/kg BID (adjust dose to his current kidney function) and keep monitoring CMV PCR weekly  - Take crypto antigen in serum, and MRI if patient persists encephalopathic  - we will follow    Assessment:    Intermittent fever secondary to CMV viremia overimposed infection?  - Patient has a prolonged hospital course with recurrent fever and encephalopathy despite broad spectrum antibiotics.  - Blood cultures has been negative so far and only colonizer elvie was found in previous BALs  - Of note, CMV viremia is improving and fever seems to be of less grade in the last days, but viral infection may not fully explain current patient symptoms.  - Patient underwent a surgery +/- 2 weeks ago and current abdominal symptoms + fever pattern may be related to any residual abscess secondary to leak.  - PRO BNP resulted mildly elevated and procalcitonin is high, but we don't have a previous level to compare.  - Patient is alert but completely disoriented. Breathing is less labored and CXR is improved.  - Patient completed 10 days of broad spectrum antimicrobial therapy with meropenem/daptomycin/micafungin and today presented low grade fever (Temp 100.5)   - We think that the most likely source of this intermittent fever is the abdomen due to his previous surgery.  - Therefore, we recommend to take a CT abdomen in order to rule out a residual collection. In addition, repeat  procalcitonin and trend CRP every 48 hours. If patient deteriorates clinically, please take blood cultures and start broad spectrum antimicrobial coverage.    CMV viremia + organ disease  - Patient presented detected low level CMV viremia (detected < 137) on admission and progressive worsening viremia after stopping valcyte prophylaxis   - Re-started valcyte on 07/15 with 4225 IU (3.6 log) and switched to GCV on 07/20 with 1906 IU (3.3). After 1 week of GCV treatment, CMV PCR dropped to 290 (2.5 log), which represents +/- 1 log less that the previous reading.  - Of note, CMV immunostain was positive from the colonic tissue confirming the etiology of the colitis.  - On 08/03, CMV PCR resulted < 137 (< 2.1 log), which is more than 1 log drop in 2 weeks.   - At this point, unclear if patient really has a CMV resistance so GCV dose was reduced to 7.5 mg/kg BID   - Continue with ganciclovir 7.5 mg/kg BID (adjust doses to his current kidney function). Keep monitoring CMV PCR weekly (next CMV PCR on 08/10)      Persistance encephalopathy  - Patient has been encephalopathic, initially related to sepsis or medication side effects. However, patient with immunosuppressive treatment may reactivate any opportunistic infection.  - Considering his persistent cognitive deterioration, please order a Crypto antigen in serum to rule out meningeal cryptococcosis, and consider to take a MRI of the brain.    EBV viremia, improving  - PAtient developed EBV viremia, intially of [id number]  - EBV PCR taken on 08/01 showed improvement (6864)  - Please monitoring for one more week.    Multifactorial pancytopenia, improving  - Likely related to medication and CMV viremia  - Currently without neutropenia    S/p liver transplant  - continue with tacrolimus; hold MMF in the context of sepsis and pancytopenia  - Currently, pancytopenia has improved, so consider to re-start bactrim prophylaxis. If cytopenia worsens, then take a G6PD and if result is  normal, start PCP prophylaxis with dapsone 50 mg QD.  - we will follow      PCP prophylaxis: stopped due to pancytopenia  Serostatus:  CMV D+/R-, EBV D+/R-  Isolation: none    Thank you for allowing us to participate in his care and call us if any question arises.    Case discussed with Dr. Katey Norris MD   PGY-1  P: [phone]    Hospital course   Mr. 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 Capitis considered a contaminant. Valcyte was stopped since admission. On 07/06, flagyl + vancomycin + micafungin were stopped and zosyn changed to ertapenem. As per ID note from 07/06, recommended to switch back ertapenem to zosyn since thrombocytopenia was seen before zosyn treatment, pancytopenia possibly related to medication (MMF, bactrim, valcyte, initially seen at the beginning of June) and recommended to monitor CMV PCR weekly ( on 07/03: positive < 137; before on 06/26 was ND). Additionally, on 07/12 a BAL was repeated and showed VRE/CONS/P putida/C. Albicans, all were considered a colonizers, and primary team continued with ertapenem, held MMF, bactrim, valcyte). On 07/13, it was recommended to re-start valcyte since his CMV PCR was 145 and counts improved. Patient persisted febrile, with evident ascitis tapped but culture resulted negative. Course complicated with thrombosis of the right arterial artery with ischemia to the tip of the  ischemic finger. On 07/15, BAL was repeated and was positive for rhinovirus. Valcyte was re-initiated on 07/15 due to CMV PCR: 4225. PAtient was discharged from the ICU on 07/17. Also, 14 days of ertapenem were completed on 07/18 and EBV PCR was taken on 07/18 and resulted [id number] concerning of PTLD since patient persisted febrile with ascitis and colitis. On 07/20, ZAKIA was switched to GCV due to worsening level of 1906. Patient became encephalopathic but MRI resulted negative. On 07/21 a colonoscopy was performed and showed normal colonic mucosa; random biopsies were taken. On 07/25, patient deteriorated clinically presenting respiratory failure requiring intubation, so was transferred to ICU and started empiric treatment with meropenem + daptomycin + micafungin, and GCV dose was increased to 10 mg BID. A CT abdomen was repeated and showed retroperitoneal air so underwent EL + lysis of adhesions + right hemicolectomy + ileostomy + partial omentectomy  due to ascending colitis (no neida perforation or ischemia). On 07/27 CMV PCR resulted 290. Patient persists with intermittent fever and encephalopathy.    Subjective: Patient is alert, but still disoriented. Presented low grade fever early this morning.    Current Scheduled Medications:    ganciclovir (CYTOVENE) intermittent infusion  7.5 mg/kg Intravenous Q12H     insulin glargine  36 Units Subcutaneous Q24H     insulin isophane human  20 Units Subcutaneous QAM     sulfamethoxazole-trimethoprim  1 tablet Oral Daily     loperamide  4 mg Oral or Feeding Tube 4x Daily     tacrolimus  6 mg Oral BID IS     diphenoxylate-atropine  2.5 mL Oral BID     insulin aspart  1-12 Units Subcutaneous Q4H     fiber modular  1 packet Per Feeding Tube TID     insulin aspart   Subcutaneous TID w/meals     protein modular  1 packet Per Feeding Tube TID     aspirin  80 mg Oral Daily     multivitamins with minerals  15 mL Per Feeding Tube Daily     heparin  5,000 Units Subcutaneous Q8H      pantoprazole  40 mg Oral or Feeding Tube Daily     sodium chloride (PF)  3 mL Intracatheter Q8H     Physical Examination:  Vital sign ranges over the past 24 hours:  Temp:  [98.1  F (36.7  C)-100.5  F (38.1  C)] 98.1  F (36.7  C)  Pulse:  [] 99  Heart Rate:  [91-99] 99  Resp:  [16-18] 18  BP: (144-165)/(81-98) 145/88  SpO2:  [96 %-97 %] 96 %    Physical Examination:  GENERAL:  Awake, responsive, mild labored breathing  EYES:  EOMI, PERRL, anicteric sclerae.  ENT:  No otorrhea.  NJ tube present.  No nasal cannula present.  No anterior oral lesion.  NECK:  Supple. CVC RIJ without surrounding erythema or discharge  LUNGS:  Few bilaterally scattered coarse rhonchi.  CARDIOVASCULAR:  Regular rate and rhythm, tachycardic, normal S1, S2, without murmur, gallop, or rub.  ABDOMEN:  Normal bowel sounds, soft, worsening generalized tenderness, midline wound is intact without inflammation with proximal mucous fistula, RLQ ileostomy present.  EXTREMITIES:  Distally warm, no edema,  ischemic right hallux and right second toe, also ischemic tip of left second toe, and right index, no ulcers.  Peripheral left upper forearm without surrounding erythema or drainage.  NEUROLOGIC:  Awake, confused, moves extremities x 4, no meningeal signs.    Inflammatory Markers    Recent Labs   Lab Test  07/05/17   1810  03/05/17   0358  11/23/16   0813  11/16/16   0706  11/15/16   1039  11/07/16   0759  11/03/16   0949  11/01/16   0853   08/15/11   1151  04/11/11   1209  01/03/11   1246  02/15/10   1232   SED   --    --   45*   --    --    --    --    --    --   11  14  17*  25*   CRP  354.0  20.0*  58.0*  59.1*  49.8*  66.0*  140.0*  150.0*   < >  11.1*  9.0*  11.7*  17.5*    < > = values in this interval not displayed.     Immune Globulin Studies     Recent Labs   Lab Test  07/24/17   0705  08/15/11   1243   IGG  1660*  1160   IGG1   --   734   IGG2   --   294   IGG3   --   7*   IGG4   --   59     Metabolic Studies       Recent Labs   Lab  Test  08/04/17   0548  08/03/17   1705  08/03/17   0533  08/02/17   1728  08/02/17   0543  08/01/17   1705  08/01/17   0651   07/31/17   1229   07/30/17   0826   07/25/17   0945   07/06/17   0316   NA  143  145*  145*  147*  150*  148*  150*   < >   --    < >   --    < >  137   < >  143   POTASSIUM  5.0  5.0  4.8  4.8  4.5  4.4  4.3   < >   --    < >   --    < >  4.0   < >  5.0   CHLORIDE  117*  120*  119*  122*  123*  122*  123*   < >   --    < >   --    < >  104   < >  116*   CO2  20  18*  20  19*  21  22  20   < >   --    < >   --    < >  26   < >  18*   ANIONGAP  6  6  6  6  6  5  7   < >   --    < >   --    < >  7   < >  9   BUN  53*  50*  53*  47*  49*  52*  51*   < >   --    < >   --    < >  77*   < >  62*   CR  0.91  0.91  0.90  0.90  0.91  0.90  0.90   < >   --    < >   --    < >  2.60*   < >  2.75*   GFRESTIMATED  87  87  88  88  87  88  89   < >   --    < >   --    < >  26*   < >  24*   GLC  89  124*  163*  166*  112*  200*  141*   < >   --    < >   --    < >  382*   < >  139*   A1C   --    --    --    --    --    --    --    --    --    --    --    --    --    --   Canceled, Test credited   Below Assay Range  NOTIFIED LEONARD ONEILL AT 0538 ON 7/6/17 BY CHRISSY     JACOB  7.9*  8.0*  8.2*  7.8*  8.0*  7.9*  8.1*   < >   --    < >   --    < >  7.6*   < >  6.9*   PHOS  3.7   --   3.6   --   3.2   --   3.1   < >   --    < >   --    < >   --    < >  5.5*   MAG  2.0   --   1.8   --   2.0   --   1.9   < >   --    < >   --    < >   --    < >  2.1   LACT   --    --    --    --    --    --    --    --   1.0   --   0.6*   < >   --    < >  1.5   CKT   --    --    --    --    --    --   16*   --    --    --    --    --   40   --    --     < > = values in this interval not displayed.     Hepatic Studies    Recent Labs   Lab Test  08/02/17   0543  07/27/17   0410  07/25/17   1651  07/25/17   0945  07/25/17   0017  07/24/17   0705  07/23/17   0625   07/11/17   0328   07/05/17   1810   BILITOTAL  0.4  1.3  0.6  0.5   --    0.4  0.3   < >  0.5   < >   --    ALKPHOS  199*  143  242*  317*   --   264*  224*   < >  158*   < >   --    ALBUMIN  2.3*  2.4*  2.0*  1.9*   --   1.7*  1.9*   < >  1.8*   < >   --    AST  62*  27  61*  90*   --   86*  63*   < >  34   < >   --    ALT  42  27  50  60   --   54  47   < >  27   < >   --    LDH   --    --    --    --   258*   --    --    --    --    --   289*   GGT   --    --    --    --    --    --    --    --   58   --    --     < > = values in this interval not displayed.     Pancreatitis testing    Recent Labs   Lab Test  07/24/17   0705  07/17/17   0630  07/12/17   0342  07/10/17   0340  07/05/17   0346  03/05/17   0358  03/03/17   1458  02/21/17   1520  12/09/16   1159  02/08/16   1144   09/30/10   1734   AMYLASE   --    --    --    --    --   53  70   --    --    --    --   82   LIPASE   --    --    --    --    --   216   --   326  161   --    --   138   TRIG  303*  168*  114  177*  174*   --    --    --    --   99   < >   --     < > = values in this interval not displayed.     Hematology Studies      Recent Labs   Lab Test  08/04/17   0548  08/03/17   0533  08/02/17   0543  08/01/17   0651  07/31/17   0546  07/30/17   0605   WBC  6.9  5.1  5.0  4.7  4.9  5.2   ANEU  4.1  2.8  2.7  2.5  2.8  3.2   ALYM  2.5  1.9  2.0  1.8  1.7  1.6   ZINA  0.3  0.3  0.2  0.2  0.3  0.3   AEOS  0.0  0.0  0.1  0.1  0.1  0.1   HGB  7.8*  7.3*  7.6*  7.9*  7.7*  8.2*   HCT  24.3*  23.5*  24.4*  24.8*  24.6*  25.8*   PLT  99*  92*  90*  99*  105*  125*     Arterial Blood Gas Testing    Recent Labs   Lab Test  08/02/17   1216  07/26/17   2243  07/26/17   2133  07/26/17   2046  07/26/17   2017  07/25/17   1746  07/25/17   1037   PH  7.37  Incorrect specimen type  NOTTIED BY WOJCIECH DEWITT, NEW ORDER TO REPLACE.7/26/17 AT 2346 BY ZAINAB.  CORRECTED ON 07/26 AT 2350: PREVIOUSLY REPORTED AS 7.27     --    --   Canceled, Test credited   Incorrect specimen type    7.32*  7.14*   PCO2  31*  Incorrect specimen type  NOTTIED BY RN  GEMINI DEWITT, NEW ORDER TO REPLACE.7/26/17 AT 2346 BY ZAINAB.  CORRECTED ON 07/26 AT 2350: PREVIOUSLY REPORTED AS 45     --    --   Canceled, Test credited   Incorrect specimen type    42  69*   PO2  69*  Incorrect specimen type  NOTTIED BY WOJCIECH DEWITT, NEW ORDER TO REPLACE.7/26/17 AT 2346 BY ZAINAB.  CORRECTED ON 07/26 AT 2350: PREVIOUSLY REPORTED AS 53     --    --   Canceled, Test credited   Incorrect specimen type    81  103   HCO3  18*  Incorrect specimen type  NOTTIED BY WOJCIECH DEWITT, NEW ORDER TO REPLACE.7/26/17 AT 2346 BY ZAINAB.  CORRECTED ON 07/26 AT 2350: PREVIOUSLY REPORTED AS 20     --    --   Canceled, Test credited   Incorrect specimen type    22  23   O2PER  21  Incorrect specimen type  NOTTIED BY WOJCIECH DEWITT, NEW ORDER TO REPLACE.7/26/17 AT 2346 BY ZAINAB.  CORRECTED ON 07/26 AT 2350: PREVIOUSLY REPORTED AS 40    40  62  100  100.0  100  40.0  7L     Urine Studies     Recent Labs   Lab Test  07/28/17   1042  07/25/17   1205  07/19/17   1150  07/11/17   1105  07/06/17   1441   URINEPH  5.0  5.0  5.0  5.0  5.0   NITRITE  Negative  Negative  Negative  Negative  Negative   LEUKEST  Negative  Trace*  Negative  Negative  Trace*   WBCU  6*  5*  2  <1  9*     Vancomycin Levels     Recent Labs   Lab Test  07/06/17   0316  10/28/16   1405   VANCOMYCIN  22.1  14.4       Component Value Flag Ref Range Units Status Collected Lab      Procalcitonin 0.78   ng/ml Final 08/02/2017  5:28 PM 51         Microbiology:  Ascites  7/26/2017 (OR) negative to date.   Gram stain:  NOS, few WBCs.    7/25/2017 negative to date.  Gram stain:  NOS, no WBCs.    7/5/2017 S capitis    BAL   7/15/2017 yeast and Rhinovirus     7/12/2017 single colony of VRE, C albicans/dubliniensis   Sputum  7/29/2017 Normal elvie, heavy C albicans    7/11/2017 VRE and Coag Neg Staph     Bcxs:    7/31 x 2, 7/28 x 2, 7/25 x 2, 7/15 NGTD    Ur cxs  7/25 negative        CMV viral loads    Recent Labs   Lab Test  08/03/17   0533  07/27/17   1109  07/20/17   142   07/18/17   0530  07/15/17   1553   CSPEC  EDTA PLASMA  EDTA PLASMA  CORRECTED ON 07/28 AT 0840: PREVIOUSLY REPORTED AS Blood    Plasma  Plasma  Bronchoalveolar Lavage   CMVLOG  <2.1  2.5*  3.3*  3.0*  3.6*     CMV viral loads    Log IU/mL of CMVQNT   Date Value Ref Range Status   08/03/2017 <2.1 <2.1 [Log_IU]/mL Final   07/27/2017 2.5 (H) <2.1 [Log_IU]/mL Final   07/20/2017 3.3 (H) <2.1 [Log_IU]/mL Final   07/18/2017 3.0 (H) <2.1 [Log_IU]/mL Final   07/15/2017 3.6 (H) <2.1 [Log_IU]/mL Final   07/10/2017 2.2 (H) <2.1 [Log_IU]/mL Final   07/03/2017 <2.1 <2.1 [Log_IU]/mL Final   06/26/2017 Not Calculated <2.1 [Log_IU]/mL Final     CMV resistance testing    EBV DNA Copies/mL   Date Value Ref Range Status   08/01/2017 6864 (A) EBVNEG [Copies]/mL Final   07/18/2017 067629 (A) EBVNEG [Copies]/mL Final     Pathology   Colectomy path 7/26/17  - Colonic wall with perforation, edema, and acute serositis   - Background colonic mucosa with no significant histologic abnormality   - CMV immunostain is positive in rare (3) cells   - Terminal ileum with no significant histologic abnormality   - Viable, unremarkable surgical margins   - One benign lymph node (0/1)   - Appendix with fibrous obliteration of the tip     Colon biopsies 7/21/2017   A: Colon biopsy, ascending   B: Colon biopsy, descending   FINAL DIAGNOSIS:   A. COLON BIOPSY, ASCENDING:   - Colonic mucosa with no significant histologic abnormality   - Negative for intraepithelial lymphocytosis or deposition of   subepithelial thick collagenous band   B. COLON BIOPSY, DESCENDING:   - Crypt architecture distortion, consistent with healed prior injury   - Negative for active inflammation or dysplasia   - Negative for intraepithelial lymphocytosis or deposition of   subepithelial thick collagenous band     Cytology of ascitic fluid 7/25/2017   PERITONEAL FLUID, ASCITES:   -Negative for malignancy   Cytology of ascitic fluid 7/14/2017.   Peritoneal fluid, ascites:   - Negative  for malignancy   - Acute and chronic inflammation present   Specimen Adequacy: Satisfactory for evaluation.   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   INTERPRETATION:   Ascites fluid:        Rare to absent viable B cells     COMMENT:   This specimen was collected on 7/14/17 but was not ordered/delivered to   lab/run until 7/18/17 - results should be interpreted with caution due   to low cellularity/viability.     Due to limited cellularity we were only able to analyze the B cells.   There is no immunophenotypic evidence of B cell non-Hodgkin lymphoma.   Neoplastic cells, including large cells, may not survive specimen   processing. This sample may not be representative. Final interpretation   requires correlation with morphologic and clinical features.     Imaging:  CXR 8/02/17  1. Decreased lung volumes with mildly increased perihilar and  bibasilar opacities, which may represent pulmonary edema, atelectasis,  or infection.  2. Small right pleural effusion.      CXR 7/31/2017  1. Improving perihilar and bibasilar opacities likely represent  infection/aspiration, atelectasis, or pulmonary edema.  Moderate  opacities bilaterally persist.   2. Stable small to moderate pleural effusions bilaterally.    CXR 7/28/2017  1.  Interval removal of the endotracheal tube.  2.  Increased perihilar opacities, worsening infection versus  pulmonary edema.   3.  Increased bilateral moderate pleural effusions with overlying  atelectasis/consolidation.     CT c/a/p 7/25/2017  1. New large heterogeneous area of right-sided retroperitoneal gas  which is highly concerning for an infectious process. Given the  history of prior neutropenic colitis and biopsies, there could also be  a component of stool from a ruptured viscus, despite the lack of  surrounding bowel loops and no extraluminal oral contrast. Surgical  consultation is recommended.   2. Bibasilar atelectasis versus consolidation with small bilateral  pleural effusions.  3.  Extensive mesenteric and soft tissue edema with large volume  ascites. Numerous mesenteric and retroperitoneal lymph nodes which are  presumably reactive.  4. Postsurgical changes of liver transplant.    CT c/a/p7/13/2017  1. Improved moderate right-sided pleural effusion and resolution of  left-sided pleural effusion. There remain large areas of  atelectasis/consolidation in both lungs, including in the left lower  lobe despite resolution of left-sided pleural effusion. Superimposed  infectious process cannot be excluded.  2. Moderate-large amount of ascites increased from 7/8/2017, which  makes it difficult to evaluate for the presence or absence of  inflammatory change or infectious process in the abdomen or pelvis. No  intra-abdominal abscess.  3. Stable small presumed hematoma within the ventral abdominal wall  fat.  4. Splenomegaly.    MRI brain 7/20/2017  1. Significant image degradation due to motion artifact, with no  definite acute intracranial pathology. No abnormal contrast enhancing  intracranial lesions.  2. Mucosal thickening in the sphenoid and maxillary sinuses and left  greater than right mastoid fluid are nonspecific in the setting of  recent intubation.

## 2017-08-04 NOTE — PROGRESS NOTES
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 Concern for CMV colitis with low count CMV viremia/GI PTLD and subsequently underwent colonoscopy on 7/21, random biopsies obtained.  On 7/25/2017 patient had respiratory distress, abdominal compartment syndrome and EJ with oliguria. Broad spectrum antibiotics were started.  He was intubated and had a paracentesis with 5L removed. He did not required pressors.  CT was obtained which showed a new large heterogeneous right sided retroperitoneal gas and air tracking along duodenum.  No contrast extravasation.  No intraperitoneal air noted. Colorectal surgery was consulted. Initial conservative management with bowel rest and IV abx. Pt continued to spike fevers despite IV abx.  Now s/p Exploratory laparotomy, right angelique-colectomy, end ileostomy, mucus fistula, partial omentectomy on 7/26.    Graft Function: Good function. LFTs WNL. US liver unremarkable.  Immunosuppression Management:   CellCept discontinued (6/27/17) due to neutropenia.   Prograf goal ~8 due to single IS. Level < 3. Repeat level < 3. Increase to 6 mg BID.   Cardiorespiratory: Acute respiratory distress with hypercapnia.  Acidotic breathing secondary to sepsis. Improved after control of sepsis. Extubated on Friday. O2 supplement PRN, now stable on room air. OOB to chair. Increase incentive spirometry use.   -Shallow breathing. O2 sat > 93% on room air. CXR showing b/l opacities, pulm edema, b/l pleural effusions.  ABG ok. Bumex x 1 yesterday. Check bcx x 2.  Hematology: Pancytopenia on admission, acute on chronic,  secondary to medications (MMF, bactrim, valcyte). Improved with holding medications and neupogen. 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  Continue to hold MMF and bactrim.   -Anemia- Hemoglobin stable. WBC stable. Last blood transfusion on 7/26.   -Cytology ascites fluid, negative for malignancy   GI: Neutropenic colitis. Colonoscopy 7/21. Biopsy results showing resolving injury secondary to possible drug induced vs infectious.   -CT scan abd/pelvis, po contrast, showed a new large heterogeneous right sided retroperitoneal gas and air tracking along duodenum.  No contrast extravasation.  No intraperitoneal air noted. Colorectal surgery consulted.  Continued to spike high grade temperature despite IV antibiotics therefore patient taken to OR. s/p Exploratory laparotomy, right angelique-colectomy, end ileostomy, mucus fistula, partial omentectomy on 7/26. Findings no neida perforation, phlegmon/inflammationright colon. Colon pathology suggested colon perforation, negative for malignancy; CMV stain positive.  WOCN consult for ostomy care.   -Honey thickened liquid diet. TF at goal.   Large output from the iliostomy - Output remains higher than goal. Goal ileostomy output ~ 1200 cc in 24 hrs.   loperamide 2mg q6H. Continue fiber TID.   Start lomotil BID, increase to 2/4 mg QID prn.   Fluid/Electrolytes/Renal: EJ oliguric likely sec to high intraabdominal pressures and ischemic ATN sec to sepsis. Nephrology consulted. No indication for RRT presently. UO good, Creatinine 0.9.  Hypernatremia, free water deficit, high ileostomy output. Ileostomy output now decreased.  Na 150->147->145 ->143 with  cc per hour, decrease to 50 q1H now with Na corrected  Endocrine: DM type 2. Endocrine consulting for glycemic management.  Infectious disease:   -Severe sepsis, right colon phlegmon. Meropenem/daptomycin/micafungin tx x 10 days completed on 8/3. S/p right angelique-colectomy. Path CMV stain +.   Fluid cx x 2 NGTD.  -CMV viremia, CMV colitis per pathology. CMV D+R-. Continue IV ganciclovir. CMV PCR decreasing now. Follow CMV PCR weekly- next due 8/3, negative.   EBV viremia, repeat EBV PCR  8/1 6864. Monitor q2 weeks.  -7/25 Ucx, Bcx and ascites cx negative to date  -ID consulting.   - Febrile to 100.5 today with tachycardia, blood cultures, cx, UA/UC to workup  Neuro: Toxic metabolic encephalopathy secondary to infection, prolonged hospital stay, significant improvement.  Vascular: Right Arterial line clot 2/2 previous arterial line. Vascular consulted. Recommend ASA only. Some evidence of microvascular injury in digits (toes) this is most likely due to injury while patient was on pressor therapy and sepsis. Now with a third toe injury, vascular consulted again. US completed.  ECHO EF 60-65%. 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   Activity: Deconditioned due to prolong hospital course. Daily PT/OT, encourage pt to be OOB to chair. Encourage pulmonary toilet.   Prophylaxis: DVT-Heparin SQ  Disposition: 7A. Will transfer to TCU for rehab once status stable. Possible transfer in a few days.       Medical Decision Making: Medium  Admit 85011 (moderate level decision making)    PILLO/Fellow/Resident Provider: Luiza Wing PA-C    Faculty: Chaparro Anderson M.D.    __________________________________________________________________  Transplant History:.  3/4/2017 DD OLT with Dr. Tran (Liver), Postoperative day: 153     Interval History:   Overnight events: No complaints. Patient confused with A&O questions.     ROS:   A 10-point review of systems was negative except as noted above.    Meds:    ganciclovir (CYTOVENE) intermittent infusion  7.5 mg/kg Intravenous Q12H     insulin glargine  36 Units Subcutaneous Q24H     insulin isophane human  20 Units Subcutaneous QAM     loperamide  2-4 mg Oral or Feeding Tube 4x Daily     tacrolimus  6 mg Oral BID IS     diphenoxylate-atropine   2.5 mL Oral BID     insulin aspart  1-12 Units Subcutaneous Q4H     fiber modular  1 packet Per Feeding Tube TID     insulin aspart   Subcutaneous TID w/meals     protein modular  1 packet Per Feeding Tube TID     aspirin  80 mg Oral Daily     multivitamins with minerals  15 mL Per Feeding Tube Daily     heparin  5,000 Units Subcutaneous Q8H     pantoprazole  40 mg Oral or Feeding Tube Daily     sodium chloride (PF)  3 mL Intracatheter Q8H       Physical Exam:     Admit Weight: 94.2 kg (207 lb 11.2 oz) (SCALE 2)    Current vitals:   BP (!) 144/98 (BP Location: Right arm)  Pulse 102  Temp 98.7  F (37.1  C) (Oral)  Resp 18  Ht 1.829 m (6')  Wt 89 kg (196 lb 1.6 oz)  SpO2 96%  BMI 26.6 kg/m2    Vital sign ranges:    Temp:  [98.1  F (36.7  C)-100.5  F (38.1  C)] 98.7  F (37.1  C)  Pulse:  [] 102  Heart Rate:  [] 96  Resp:  [16-18] 18  BP: (144-165)/(81-98) 144/98  SpO2:  [95 %-97 %] 96 %  Patient Vitals for the past 24 hrs:   BP Temp Temp src Pulse Heart Rate Resp SpO2   08/04/17 1309 (!) 144/98 98.7  F (37.1  C) Oral 102 - 18 96 %   08/04/17 0806 144/82 98.1  F (36.7  C) Oral 95 - 18 97 %   08/04/17 0426 153/90 100.5  F (38.1  C) Oral - 96 18 97 %   08/04/17 0000 156/87 99.5  F (37.5  C) Oral - 98 16 96 %   08/03/17 2041 165/81 98.5  F (36.9  C) Oral 91 91 18 96 %   08/03/17 1641 (!) 145/92 99.7  F (37.6  C) Oral - 100 16 95 %   08/03/17 1456 - - - 97 - - -     General Appearance: NAD  Skin: normal, warm, dry  Heart: RRR  Lungs: B/l decreased bases, shallow breathing.  Abdomen: soft, non distended. Ileostomy with brown output. Mucus fistula, pink. Midline incision, c/d/i. Chevron incision well healed.  : No vazquez.   Extremities: No edema. Ext NT x 4.  Neurologic: A&O x 3.       Data:   CMP    Recent Labs  Lab 08/04/17  0548 08/03/17  1705 08/03/17  0533  08/02/17  0543    145* 145*  < > 150*   POTASSIUM 5.0 5.0 4.8  < > 4.5   CHLORIDE 117* 120* 119*  < > 123*   CO2 20 18* 20  < > 21   GLC 89  124* 163*  < > 112*   BUN 53* 50* 53*  < > 49*   CR 0.91 0.91 0.90  < > 0.91   GFRESTIMATED 87 87 88  < > 87   GFRESTBLACK >90African American GFR Calc >90African American GFR Calc >90African American GFR Calc  < > >90African American GFR Calc   JACOB 7.9* 8.0* 8.2*  < > 8.0*   MAG 2.0  --  1.8  --  2.0   PHOS 3.7  --  3.6  --  3.2   ALBUMIN  --   --   --   --  2.3*   BILITOTAL  --   --   --   --  0.4   ALKPHOS  --   --   --   --  199*   AST  --   --   --   --  62*   ALT  --   --   --   --  42   < > = values in this interval not displayed.  CBC    Recent Labs  Lab 08/04/17  0548 08/03/17  0533   HGB 7.8* 7.3*   WBC 6.9 5.1   PLT 99* 92*     COAGS  No lab results found in last 7 days.    Invalid input(s): XA   Urinalysis  Recent Labs   Lab Test  07/28/17   1042  07/25/17   1205   06/14/17   1508   04/11/16   1345   COLOR  Yellow  Dark Yellow   < >   --    < >  Yellow   APPEARANCE  Slightly Cloudy  Cloudy   < >   --    < >  Clear   URINEGLC  Negative  70*   < >   --    < >  30*   URINEBILI  Negative  Negative   < >   --    < >  Negative   URINEKETONE  Negative  Negative   < >   --    < >  Negative   SG  1.012  1.014   < >   --    < >  1.016   UBLD  Trace*  Moderate*   < >   --    < >  Small*   URINEPH  5.0  5.0   < >   --    < >  5.0   PROTEIN  30*  100*   < >   --    < >  30*   NITRITE  Negative  Negative   < >   --    < >  Negative   LEUKEST  Negative  Trace*   < >   --    < >  Negative   RBCU  5*  >182*   < >   --    < >  1   WBCU  6*  5*   < >   --    < >  1   UTPG   --    --    --   1.55*   --   0.41*    < > = values in this interval not displayed.          7/21/2017   SPECIMEN(S):   A: Colon biopsy, ascending   B: Colon biopsy, descending     FINAL DIAGNOSIS:   A. COLON BIOPSY, ASCENDING:   - Colonic mucosa with no significant histologic abnormality   - Negative for intraepithelial lymphocytosis or deposition of   subepithelial thick collagenous band     B. COLON BIOPSY, DESCENDING:   - Crypt architecture  "distortion, consistent with healed prior injury   - Negative for active inflammation or dysplasia   - Negative for intraepithelial lymphocytosis or deposition of   subepithelial thick collagenous band   - Please, see comment     COMMENT:   Specimen B, \"descending colon\" features lamina propria edema, slight   variation in crypt sizes and shapes and occasional crypt drop-out.  This   may indicate a resolving injury which could be drug-induced or   infectious.     CT scan 7/25/2017:   IMPRESSION:   1. New large heterogeneous area of right-sided retroperitoneal gas  which is highly concerning for an infectious process. Given the  history of prior neutropenic colitis and biopsies, there could also be  a component of stool from a ruptured viscus, despite the lack of  surrounding bowel loops and no extraluminal oral contrast. Surgical  consultation is recommended.      2. Bibasilar atelectasis versus consolidation with small bilateral  pleural effusions.     3. Extensive mesenteric and soft tissue edema with large volume  ascites. Numerous mesenteric and retroperitoneal lymph nodes which are  presumably reactive.     4. Postsurgical changes of liver transplant.     [Urgent Result: Retroperitoneal gas]     Finding was identified on 7/25/2017 5:34 PM.      Dr. Santoyo was contacted by Dr. Atkins at 7/25/2017 5:37 PM and  verbalized understanding of the urgent finding.      I have personally reviewed the examination and initial interpretation  and I agree with the findings.     LUCI HOROWITZ [facility]    Attestation:    The patient has been seen and evaluated by me.   Vital signs, labs, medications and orders were reviewed.   When obtained, diagnostic images were reviewed by me and interpreted as above.    The care plan was discussed with the multidisciplinary team and I agree with the findings and plan in this note, with any differences recorded in blue.    Immunosuppressive medication management was reviewed and adjusted as " reflected in the note and orders.     .

## 2017-08-04 NOTE — PLAN OF CARE
Problem: Goal Outcome Summary  Goal: Goal Outcome Summary  SLP: Pt seen bedside for swallow f/u.  Pt only agreeable to trials to thin liquids despite encouragement and rationale.  Recommend continue full liquids when alert and upright with supervision.  Pt will need to pace self, take small bites/sips, and alternate consistencies.  ST to follow.

## 2017-08-04 NOTE — PLAN OF CARE
Problem: Sepsis (Adult)  Goal: Signs and Symptoms of Listed Potential Problems Will be Absent or Manageable (Sepsis)  Signs and symptoms of listed potential problems will be absent or manageable by discharge/transition of care (reference Sepsis (Adult) CPG).   Outcome: No Change  Pt remains confused. Pt continues to c/o pain every time he is touched. He has been incontinent of a large amount of urine x 2. Both times he had removed his gown and pad then c/o being cold. He was saying ouch ouch ouch repeatedly was not only unable to follow instructions but also resistive to cares. Oxycodone 10 mg was given 20-30 minutes before attempting to change his pad and linens the 2nd and it didn't make much difference. BS-140-96. Ostomy put out 475 cc. Speech has been illogical.

## 2017-08-05 ENCOUNTER — APPOINTMENT (OUTPATIENT)
Dept: PHYSICAL THERAPY | Facility: CLINIC | Age: 52
End: 2017-08-05
Attending: TRANSPLANT SURGERY
Payer: COMMERCIAL

## 2017-08-05 ENCOUNTER — APPOINTMENT (OUTPATIENT)
Dept: INTERVENTIONAL RADIOLOGY/VASCULAR | Facility: CLINIC | Age: 52
End: 2017-08-05
Attending: RADIOLOGY
Payer: COMMERCIAL

## 2017-08-05 LAB
ABO + RH BLD: NORMAL
ABO + RH BLD: NORMAL
ALBUMIN FLD-MCNC: 1.6 G/DL
ALBUMIN UR-MCNC: 30 MG/DL
ANION GAP SERPL CALCULATED.3IONS-SCNC: 5 MMOL/L (ref 3–14)
APPEARANCE FLD: NORMAL
APPEARANCE UR: ABNORMAL
BACTERIA #/AREA URNS HPF: ABNORMAL /HPF
BASOPHILS # BLD AUTO: 0 10E9/L (ref 0–0.2)
BASOPHILS NFR BLD AUTO: 0.2 %
BILIRUB UR QL STRIP: NEGATIVE
BLD GP AB SCN SERPL QL: NORMAL
BLD PROD TYP BPU: NORMAL
BLD UNIT ID BPU: 0
BLD UNIT ID BPU: 0
BLOOD BANK CMNT PATIENT-IMP: NORMAL
BLOOD PRODUCT CODE: NORMAL
BLOOD PRODUCT CODE: NORMAL
BPU ID: NORMAL
BPU ID: NORMAL
BUN SERPL-MCNC: 47 MG/DL (ref 7–30)
CALCIUM SERPL-MCNC: 8 MG/DL (ref 8.5–10.1)
CHLORIDE SERPL-SCNC: 115 MMOL/L (ref 94–109)
CO2 SERPL-SCNC: 18 MMOL/L (ref 20–32)
COLOR FLD: YELLOW
COLOR UR AUTO: YELLOW
CREAT SERPL-MCNC: 0.9 MG/DL (ref 0.66–1.25)
CRP SERPL-MCNC: 140 MG/L (ref 0–8)
CRYPTOC AG SPEC QL: NORMAL
DIFFERENTIAL METHOD BLD: ABNORMAL
EOSINOPHIL # BLD AUTO: 0 10E9/L (ref 0–0.7)
EOSINOPHIL NFR BLD AUTO: 0.5 %
ERYTHROCYTE [DISTWIDTH] IN BLOOD BY AUTOMATED COUNT: 17.5 % (ref 10–15)
GFR SERPL CREATININE-BSD FRML MDRD: 88 ML/MIN/1.7M2
GLUCOSE BLDC GLUCOMTR-MCNC: 104 MG/DL (ref 70–99)
GLUCOSE BLDC GLUCOMTR-MCNC: 110 MG/DL (ref 70–99)
GLUCOSE BLDC GLUCOMTR-MCNC: 127 MG/DL (ref 70–99)
GLUCOSE BLDC GLUCOMTR-MCNC: 133 MG/DL (ref 70–99)
GLUCOSE BLDC GLUCOMTR-MCNC: 134 MG/DL (ref 70–99)
GLUCOSE BLDC GLUCOMTR-MCNC: 148 MG/DL (ref 70–99)
GLUCOSE BLDC GLUCOMTR-MCNC: 97 MG/DL (ref 70–99)
GLUCOSE SERPL-MCNC: 92 MG/DL (ref 70–99)
GLUCOSE UR STRIP-MCNC: NEGATIVE MG/DL
GRAM STN SPEC: NORMAL
HCT VFR BLD AUTO: 20.9 % (ref 40–53)
HGB BLD-MCNC: 6.9 G/DL (ref 13.3–17.7)
HGB UR QL STRIP: NEGATIVE
IMM GRANULOCYTES # BLD: 0 10E9/L (ref 0–0.4)
IMM GRANULOCYTES NFR BLD: 0.2 %
INR PPP: 1.19 (ref 0.86–1.14)
KETONES UR STRIP-MCNC: NEGATIVE MG/DL
LEUKOCYTE ESTERASE UR QL STRIP: NEGATIVE
LYMPHOCYTES # BLD AUTO: 2.2 10E9/L (ref 0.8–5.3)
LYMPHOCYTES NFR BLD AUTO: 34.3 %
LYMPHOCYTES NFR FLD MANUAL: 6 %
MAGNESIUM SERPL-MCNC: 2.3 MG/DL (ref 1.6–2.3)
MCH RBC QN AUTO: 29 PG (ref 26.5–33)
MCHC RBC AUTO-ENTMCNC: 33 G/DL (ref 31.5–36.5)
MCV RBC AUTO: 88 FL (ref 78–100)
MICRO REPORT STATUS: NORMAL
MICRO REPORT STATUS: NORMAL
MONOCYTES # BLD AUTO: 0.2 10E9/L (ref 0–1.3)
MONOCYTES NFR BLD AUTO: 3.7 %
MONOS+MACROS NFR FLD MANUAL: 3 %
NEUTROPHILS # BLD AUTO: 3.9 10E9/L (ref 1.6–8.3)
NEUTROPHILS NFR BLD AUTO: 61.1 %
NEUTS BAND NFR FLD MANUAL: 91 %
NITRATE UR QL: NEGATIVE
NRBC # BLD AUTO: 0 10*3/UL
NRBC BLD AUTO-RTO: 0 /100
NUM BPU REQUESTED: 2
PH UR STRIP: 5 PH (ref 5–7)
PHOSPHATE SERPL-MCNC: 4.1 MG/DL (ref 2.5–4.5)
PLATELET # BLD AUTO: 94 10E9/L (ref 150–450)
POTASSIUM SERPL-SCNC: 5.2 MMOL/L (ref 3.4–5.3)
PROCALCITONIN SERPL-MCNC: 1.06 NG/ML
PROT FLD-MCNC: 3.8 G/DL
RBC # BLD AUTO: 2.38 10E12/L (ref 4.4–5.9)
RBC # FLD: NORMAL /UL
RBC #/AREA URNS AUTO: 0 /HPF (ref 0–2)
SODIUM SERPL-SCNC: 139 MMOL/L (ref 133–144)
SP GR UR STRIP: 1.02 (ref 1–1.03)
SPECIMEN EXP DATE BLD: NORMAL
SPECIMEN SOURCE FLD: NORMAL
SPECIMEN SOURCE: NORMAL
SPECIMEN SOURCE: NORMAL
TACROLIMUS BLD-MCNC: ABNORMAL UG/L (ref 5–15)
TME LAST DOSE: ABNORMAL H
TRANSFUSION STATUS PATIENT QL: NORMAL
URN SPEC COLLECT METH UR: ABNORMAL
UROBILINOGEN UR STRIP-MCNC: NORMAL MG/DL (ref 0–2)
WBC # BLD AUTO: 6.3 10E9/L (ref 4–11)
WBC # FLD AUTO: 484 /UL
WBC #/AREA URNS AUTO: 5 /HPF (ref 0–2)

## 2017-08-05 PROCEDURE — 87070 CULTURE OTHR SPECIMN AEROBIC: CPT | Performed by: STUDENT IN AN ORGANIZED HEALTH CARE EDUCATION/TRAINING PROGRAM

## 2017-08-05 PROCEDURE — 87102 FUNGUS ISOLATION CULTURE: CPT | Performed by: STUDENT IN AN ORGANIZED HEALTH CARE EDUCATION/TRAINING PROGRAM

## 2017-08-05 PROCEDURE — 87075 CULTR BACTERIA EXCEPT BLOOD: CPT | Performed by: STUDENT IN AN ORGANIZED HEALTH CARE EDUCATION/TRAINING PROGRAM

## 2017-08-05 PROCEDURE — 89051 BODY FLUID CELL COUNT: CPT | Performed by: STUDENT IN AN ORGANIZED HEALTH CARE EDUCATION/TRAINING PROGRAM

## 2017-08-05 PROCEDURE — 25000132 ZZH RX MED GY IP 250 OP 250 PS 637: Performed by: SURGERY

## 2017-08-05 PROCEDURE — 40000193 ZZH STATISTIC PT WARD VISIT: Performed by: REHABILITATION PRACTITIONER

## 2017-08-05 PROCEDURE — 80048 BASIC METABOLIC PNL TOTAL CA: CPT | Performed by: TRANSPLANT SURGERY

## 2017-08-05 PROCEDURE — 86923 COMPATIBILITY TEST ELECTRIC: CPT | Performed by: STUDENT IN AN ORGANIZED HEALTH CARE EDUCATION/TRAINING PROGRAM

## 2017-08-05 PROCEDURE — 86901 BLOOD TYPING SEROLOGIC RH(D): CPT | Performed by: STUDENT IN AN ORGANIZED HEALTH CARE EDUCATION/TRAINING PROGRAM

## 2017-08-05 PROCEDURE — 25000132 ZZH RX MED GY IP 250 OP 250 PS 637: Performed by: STUDENT IN AN ORGANIZED HEALTH CARE EDUCATION/TRAINING PROGRAM

## 2017-08-05 PROCEDURE — 85025 COMPLETE CBC W/AUTO DIFF WBC: CPT | Performed by: TRANSPLANT SURGERY

## 2017-08-05 PROCEDURE — 84100 ASSAY OF PHOSPHORUS: CPT | Performed by: TRANSPLANT SURGERY

## 2017-08-05 PROCEDURE — 25000128 H RX IP 250 OP 636: Performed by: STUDENT IN AN ORGANIZED HEALTH CARE EDUCATION/TRAINING PROGRAM

## 2017-08-05 PROCEDURE — 81001 URINALYSIS AUTO W/SCOPE: CPT | Performed by: PHYSICIAN ASSISTANT

## 2017-08-05 PROCEDURE — 27210913 ZZH NUTRITION PRODUCT INTERMEDIATE PACKET

## 2017-08-05 PROCEDURE — 0W9G3ZX DRAINAGE OF PERITONEAL CAVITY, PERCUTANEOUS APPROACH, DIAGNOSTIC: ICD-10-PCS | Performed by: RADIOLOGY

## 2017-08-05 PROCEDURE — 86900 BLOOD TYPING SEROLOGIC ABO: CPT | Performed by: STUDENT IN AN ORGANIZED HEALTH CARE EDUCATION/TRAINING PROGRAM

## 2017-08-05 PROCEDURE — P9016 RBC LEUKOCYTES REDUCED: HCPCS | Performed by: STUDENT IN AN ORGANIZED HEALTH CARE EDUCATION/TRAINING PROGRAM

## 2017-08-05 PROCEDURE — 84145 PROCALCITONIN (PCT): CPT | Performed by: TRANSPLANT SURGERY

## 2017-08-05 PROCEDURE — 27210432 ZZH NUTRITION PRODUCT RENAL BASIC LITER

## 2017-08-05 PROCEDURE — 36592 COLLECT BLOOD FROM PICC: CPT | Performed by: STUDENT IN AN ORGANIZED HEALTH CARE EDUCATION/TRAINING PROGRAM

## 2017-08-05 PROCEDURE — 86140 C-REACTIVE PROTEIN: CPT | Performed by: TRANSPLANT SURGERY

## 2017-08-05 PROCEDURE — 25000132 ZZH RX MED GY IP 250 OP 250 PS 637: Performed by: PHYSICIAN ASSISTANT

## 2017-08-05 PROCEDURE — 84157 ASSAY OF PROTEIN OTHER: CPT | Performed by: STUDENT IN AN ORGANIZED HEALTH CARE EDUCATION/TRAINING PROGRAM

## 2017-08-05 PROCEDURE — 83735 ASSAY OF MAGNESIUM: CPT | Performed by: TRANSPLANT SURGERY

## 2017-08-05 PROCEDURE — 82042 OTHER SOURCE ALBUMIN QUAN EA: CPT | Performed by: STUDENT IN AN ORGANIZED HEALTH CARE EDUCATION/TRAINING PROGRAM

## 2017-08-05 PROCEDURE — 80197 ASSAY OF TACROLIMUS: CPT | Performed by: PHYSICIAN ASSISTANT

## 2017-08-05 PROCEDURE — 25000128 H RX IP 250 OP 636: Performed by: PHYSICIAN ASSISTANT

## 2017-08-05 PROCEDURE — 27211039 IR PARACENTESIS

## 2017-08-05 PROCEDURE — 87205 SMEAR GRAM STAIN: CPT | Performed by: STUDENT IN AN ORGANIZED HEALTH CARE EDUCATION/TRAINING PROGRAM

## 2017-08-05 PROCEDURE — 87899 AGENT NOS ASSAY W/OPTIC: CPT | Performed by: PHYSICIAN ASSISTANT

## 2017-08-05 PROCEDURE — 12000025 ZZH R&B TRANSPLANT INTERMEDIATE

## 2017-08-05 PROCEDURE — 87086 URINE CULTURE/COLONY COUNT: CPT | Performed by: PHYSICIAN ASSISTANT

## 2017-08-05 PROCEDURE — 97530 THERAPEUTIC ACTIVITIES: CPT | Mod: GP | Performed by: REHABILITATION PRACTITIONER

## 2017-08-05 PROCEDURE — 36592 COLLECT BLOOD FROM PICC: CPT | Performed by: TRANSPLANT SURGERY

## 2017-08-05 PROCEDURE — 86850 RBC ANTIBODY SCREEN: CPT | Performed by: STUDENT IN AN ORGANIZED HEALTH CARE EDUCATION/TRAINING PROGRAM

## 2017-08-05 PROCEDURE — 25000131 ZZH RX MED GY IP 250 OP 636 PS 637: Performed by: PHYSICIAN ASSISTANT

## 2017-08-05 PROCEDURE — 00000146 ZZHCL STATISTIC GLUCOSE BY METER IP

## 2017-08-05 PROCEDURE — 85610 PROTHROMBIN TIME: CPT | Performed by: STUDENT IN AN ORGANIZED HEALTH CARE EDUCATION/TRAINING PROGRAM

## 2017-08-05 PROCEDURE — 25000132 ZZH RX MED GY IP 250 OP 250 PS 637: Performed by: TRANSPLANT SURGERY

## 2017-08-05 RX ORDER — LIDOCAINE 40 MG/G
CREAM TOPICAL
Status: DISCONTINUED | OUTPATIENT
Start: 2017-08-05 | End: 2017-08-15

## 2017-08-05 RX ORDER — DIPHENOXYLATE HCL/ATROPINE 2.5-.025/5
5 LIQUID (ML) ORAL 2 TIMES DAILY
Status: DISCONTINUED | OUTPATIENT
Start: 2017-08-05 | End: 2017-08-06

## 2017-08-05 RX ADMIN — SULFAMETHOXAZOLE AND TRIMETHOPRIM 1 TABLET: 400; 80 TABLET ORAL at 09:03

## 2017-08-05 RX ADMIN — TACROLIMUS 6 MG: 5 CAPSULE ORAL at 09:02

## 2017-08-05 RX ADMIN — HEPARIN SODIUM 5000 UNITS: 5000 INJECTION, SOLUTION INTRAVENOUS; SUBCUTANEOUS at 23:52

## 2017-08-05 RX ADMIN — TACROLIMUS 6 MG: 5 CAPSULE ORAL at 17:33

## 2017-08-05 RX ADMIN — OXYCODONE HYDROCHLORIDE 10 MG: 5 SOLUTION ORAL at 09:51

## 2017-08-05 RX ADMIN — LOPERAMIDE HYDROCHLORIDE 4 MG: 2 SOLUTION ORAL at 17:29

## 2017-08-05 RX ADMIN — Medication 2.5 ML: at 09:16

## 2017-08-05 RX ADMIN — Medication 5 ML: at 19:58

## 2017-08-05 RX ADMIN — LOPERAMIDE HYDROCHLORIDE 4 MG: 2 SOLUTION ORAL at 19:58

## 2017-08-05 RX ADMIN — MULTIVIT AND MINERALS-FERROUS GLUCONATE 9 MG IRON/15 ML ORAL LIQUID 15 ML: at 09:02

## 2017-08-05 RX ADMIN — Medication 1 PACKET: at 19:58

## 2017-08-05 RX ADMIN — LOPERAMIDE HYDROCHLORIDE 4 MG: 2 SOLUTION ORAL at 09:02

## 2017-08-05 RX ADMIN — Medication 1 PACKET: at 09:16

## 2017-08-05 RX ADMIN — HEPARIN SODIUM 5000 UNITS: 5000 INJECTION, SOLUTION INTRAVENOUS; SUBCUTANEOUS at 09:03

## 2017-08-05 RX ADMIN — Medication 80 MG: at 09:02

## 2017-08-05 RX ADMIN — GANCICLOVIR SODIUM 650 MG: 500 INJECTION, POWDER, LYOPHILIZED, FOR SOLUTION INTRAVENOUS at 19:40

## 2017-08-05 RX ADMIN — PANTOPRAZOLE SODIUM 40 MG: 40 TABLET, DELAYED RELEASE ORAL at 09:03

## 2017-08-05 RX ADMIN — Medication 1 PACKET: at 13:44

## 2017-08-05 RX ADMIN — HEPARIN SODIUM 5000 UNITS: 5000 INJECTION, SOLUTION INTRAVENOUS; SUBCUTANEOUS at 17:29

## 2017-08-05 RX ADMIN — LOPERAMIDE HYDROCHLORIDE 4 MG: 2 SOLUTION ORAL at 13:44

## 2017-08-05 RX ADMIN — HEPARIN SODIUM 5000 UNITS: 5000 INJECTION, SOLUTION INTRAVENOUS; SUBCUTANEOUS at 00:05

## 2017-08-05 RX ADMIN — GANCICLOVIR SODIUM 650 MG: 500 INJECTION, POWDER, LYOPHILIZED, FOR SOLUTION INTRAVENOUS at 09:15

## 2017-08-05 NOTE — PROVIDER NOTIFICATION
Just now verbally notified Dr. Alexis Garcia that Camacho' hemoglobin was called to me by the lab as critical, at 6.9.

## 2017-08-05 NOTE — PROCEDURES
Interventional Radiology Brief Post Procedure Note    Procedure: IR PARACENTESIS    Proceduralist: Philippe Holloway MD    Assistant: Damián Francis MD    Time Out: Prior to the start of the procedure and with procedural staff participation, I verbally confirmed the patient s identity using two indicators, relevant allergies, that the procedure was appropriate and matched the consent or emergent situation, and that the correct equipment/implants were available. Immediately prior to starting the procedure I conducted the Time Out with the procedural staff and re-confirmed the patient s name, procedure, and site/side. (The Joint Commission universal protocol was followed.)  Yes    Medications   Medication Event Details Admin User Admin Time       Sedation: None. Local Anesthestic used    Findings: moderate size pocket of fluid in the LLQ, successfully 250 mL of fluid aspirated and sent to lab. The fluid was complex with floating debris and multiple septations.     Estimated Blood Loss: Minimal    Fluoroscopy Time:  minute(s)    SPECIMENS: Fluid and/or tissue for Gram stain and culture    Complications: 1. None     Condition: Stable    Plan: specimens sent to the cytology and microbiology labs, results pending.     Comments: See dictated procedure note for full details.    aDmián Francis MD

## 2017-08-05 NOTE — PLAN OF CARE
Problem: Goal Outcome Summary  Goal: Goal Outcome Summary  Outcome: Therapy, progress toward functional goals as expected  7A PT- Pt agreeable to PT session, however very confused and somnolent throughout session. Pt stated that he amb 2 miles earlier today, however was unable to participate in basic bed mobility with PT. Also stated that he prefers multi-step directions, however frequently unable to follow single-step commands during session. Frequent reminders and re-instruction required regarding abdominal precautions and log roll technique. Pt rolled to R and L x6 reps throughout session with min-modAx2. Attempted supine<>sit at EOB x4 reps, however pt unable to correctly follow abdominal precautions. Pt complained of pain in abdomen and back that caused him to refuse further treatment. Recommend discharge to TCU to progress to independent and safe functional mobility.

## 2017-08-05 NOTE — PROGRESS NOTES
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 Concern for CMV colitis with low count CMV viremia/GI PTLD and subsequently underwent colonoscopy on 7/21, random biopsies obtained.  On 7/25/2017 patient had respiratory distress, abdominal compartment syndrome and EJ with oliguria. Broad spectrum antibiotics were started.  He was intubated and had a paracentesis with 5L removed. He did not required pressors.  CT was obtained which showed a new large heterogeneous right sided retroperitoneal gas and air tracking along duodenum.  No contrast extravasation.  No intraperitoneal air noted. Colorectal surgery was consulted. Initial conservative management with bowel rest and IV abx. Pt continued to spike fevers despite IV abx.  Now s/p Exploratory laparotomy, right angelique-colectomy, end ileostomy, mucus fistula, partial omentectomy on 7/26.    Graft Function: Good function. LFTs WNL. US liver unremarkable.  Immunosuppression Management:   CellCept discontinued (6/27/17) due to neutropenia.   Prograf goal ~8 due to single IS. Level < 3. Repeat level < 3. Increased to 6 mg BID.   Cardiorespiratory: Stable respiratory status, NLB on RA  Hematology: Pancytopenia on admission, acute on chronic, secondary to medications (MMF, bactrim, valcyte). Improved with holding medications and neupogen. 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  Continue to hold MMF and bactrim.   -Anemia- Hemoglobin stable. WBC stable. Last blood transfusion on 7/26.   -Cytology ascites fluid, negative for malignancy   GI:  Neutropenic colitis. Colonoscopy 7/21. Biopsy results showing resolving injury secondary to possible drug induced vs infectious.   -CT scan abd/pelvis, po contrast, showed a new large heterogeneous right sided retroperitoneal gas and air tracking along duodenum.  No contrast extravasation.  No intraperitoneal air noted. Colorectal surgery consulted.  Continued to spike high grade temperature despite IV antibiotics therefore patient taken to OR. s/p Exploratory laparotomy, right angelique-colectomy, end ileostomy, mucus fistula, partial omentectomy on 7/26. Findings no neida perforation, phlegmon/inflammationright colon. Colon pathology suggested colon perforation, negative for malignancy; CMV stain positive.  WOCN consult for ostomy care.   -Honey thickened liquid diet. TF at goal.   Large output from the iliostomy - Output remains higher than goal. Goal ileostomy output ~ 1200 cc in 24 hrs.   loperamide 2mg q6H. Continue fiber TID.   Start lomotil BID, 5mg.  Fluid/Electrolytes/Renal: EJ oliguric likely sec to high intraabdominal pressures and ischemic ATN sec to sepsis. Nephrology consulted. No indication for RRT presently. UO good, Creatinine 0.9.  Hypernatremia, free water deficit, high ileostomy output. Ileostomy output now decreased.  Na 150->147->145 ->143 with  cc per hour, decrease to 50 q1H now with Na corrected  Endocrine: DM type 2. Endocrine consulting for glycemic management.  Infectious disease:   -Severe sepsis, right colon phlegmon. Meropenem/daptomycin/micafungin tx x 10 days completed on 8/3. S/p right angelique-colectomy. Path CMV stain +.  Fluid cx x 2 NGTD.  -CMV viremia, CMV colitis per pathology. CMV D+R-. Continue IV ganciclovir. CMV PCR decreasing now. Follow CMV PCR weekly- next due 8/3, negative.   EBV viremia, repeat EBV PCR  8/1 6864. Monitor q2 weeks.  -7/25 Ucx, Bcx and ascites cx negative to date  -ID consulting.   - Persistent RP gaseous process- will discuss with IR regarding  intervention.  - Will discuss paracentesis given peritoneal enhancement on CT  - No new abx at this time per ID  Neuro: Toxic metabolic encephalopathy secondary to infection, prolonged hospital stay, significant improvement.  Vascular: Right Arterial line clot 2/2 previous arterial line. Vascular consulted. Recommend ASA only. Some evidence of microvascular injury in digits (toes) this is most likely due to injury while patient was on pressor therapy and sepsis. Now with a third toe injury, vascular consulted again. US completed.  ECHO EF 60-65%. 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   Activity: Deconditioned due to prolong hospital course. Daily PT/OT, encourage pt to be OOB to chair. Encourage pulmonary toilet.   Prophylaxis: DVT-Heparin SQ  Disposition: 7A. Will transfer to TCU for rehab once status stable. Possible transfer in a few days.       Medical Decision Making: Medium  Admit [id number] (moderate level decision making)    PILLO/Fellow/Resident Provider:   Perico Garcia   Surgery PGY3  [phone]    Faculty: Chaparro Anderson M.D.    __________________________________________________________________  Transplant History:.  3/4/2017 DD OLT with Dr. Tran (Liver), Postoperative day: 154     Interval History:   Overnight events: No complaints. Patient oriented, feels better.    ROS:   A 10-point review of systems was negative except as noted above.    Meds:    insulin isophane human  17 Units Subcutaneous QAM     insulin glargine  32 Units Subcutaneous Q24H     ganciclovir (CYTOVENE) intermittent infusion  7.5 mg/kg Intravenous Q12H     sulfamethoxazole-trimethoprim  1 tablet Oral Daily     loperamide  4 mg Oral or Feeding Tube 4x Daily     tacrolimus  6 mg Oral BID IS     diphenoxylate-atropine  2.5 mL Oral BID     insulin aspart  1-12 Units Subcutaneous Q4H     fiber modular  1 packet Per Feeding Tube TID     insulin aspart   Subcutaneous TID w/meals     protein modular  1  packet Per Feeding Tube TID     aspirin  80 mg Oral Daily     multivitamins with minerals  15 mL Per Feeding Tube Daily     heparin  5,000 Units Subcutaneous Q8H     pantoprazole  40 mg Oral or Feeding Tube Daily     sodium chloride (PF)  3 mL Intracatheter Q8H       Physical Exam:     Admit Weight: 94.2 kg (207 lb 11.2 oz) (SCALE 2)    Current vitals:   BP (!) 156/102  Pulse 99  Temp 98.4  F (36.9  C)  Resp 20  Ht 1.829 m (6')  Wt 91.8 kg (202 lb 6.4 oz)  SpO2 94%  BMI 27.45 kg/m2    Vital sign ranges:    Temp:  [98.1  F (36.7  C)-99  F (37.2  C)] 98.4  F (36.9  C)  Pulse:  [] 99  Heart Rate:  [90-99] 90  Resp:  [18-20] 20  BP: (144-156)/() 156/102  SpO2:  [94 %-98 %] 94 %  Patient Vitals for the past 24 hrs:   BP Temp Temp src Pulse Heart Rate Resp SpO2 Weight   08/05/17 0900 - - - - - - - 91.8 kg (202 lb 6.4 oz)   08/05/17 0705 (!) 156/102 98.4  F (36.9  C) - - 90 20 94 % -   08/05/17 0401 144/80 98.3  F (36.8  C) Oral - 92 18 98 % -   08/05/17 0013 146/75 99  F (37.2  C) Oral - 94 18 96 % -   08/04/17 1940 (!) 156/95 98.2  F (36.8  C) Oral - 91 18 95 % -   08/04/17 1612 145/88 98.1  F (36.7  C) Oral 99 99 18 96 % -   08/04/17 1309 (!) 144/98 98.7  F (37.1  C) Oral 102 - 18 96 % -     General Appearance: NAD  Skin: normal, warm, dry  Heart: RRR  Lungs: B/l decreased bases, shallow breathing.  Abdomen: soft, non distended. Ileostomy with brown output. Mucus fistula, pink. Midline incision, c/d/i. Chevron incision well healed.  : No vazquez.   Extremities: No edema. Ext NT x 4.  Neurologic: A&O x 3.       Data:   CMP    Recent Labs  Lab 08/05/17  0616 08/04/17  1721 08/04/17  0548  08/02/17  0543    141 143  < > 150*   POTASSIUM 5.2 5.1 5.0  < > 4.5   CHLORIDE 115* 113* 117*  < > 123*   CO2 18* 19* 20  < > 21   GLC 92 102* 89  < > 112*   BUN 47* 50* 53*  < > 49*   CR 0.90 0.88 0.91  < > 0.91   GFRESTIMATED 88 >90Non  GFR Calc 87  < > 87   GFRESTBLACK >90African American GFR  Calc >90African American GFR Calc >90African American GFR Calc  < > >90African American GFR Calc   JACOB 8.0* 8.0* 7.9*  < > 8.0*   MAG 2.3  --  2.0  < > 2.0   PHOS 4.1  --  3.7  < > 3.2   ALBUMIN  --   --   --   --  2.3*   BILITOTAL  --   --   --   --  0.4   ALKPHOS  --   --   --   --  199*   AST  --   --   --   --  62*   ALT  --   --   --   --  42   < > = values in this interval not displayed.  CBC    Recent Labs  Lab 08/05/17  0616 08/04/17  0548   HGB 6.9* 7.8*   WBC 6.3 6.9   PLT 94* 99*     COAGS    Recent Labs  Lab 08/05/17  0949   INR 1.19*      Urinalysis  Recent Labs   Lab Test  08/05/17   0617  07/28/17   1042   06/14/17   1508   04/11/16   1345   COLOR  Yellow  Yellow   < >   --    < >  Yellow   APPEARANCE  Slightly Cloudy  Slightly Cloudy   < >   --    < >  Clear   URINEGLC  Negative  Negative   < >   --    < >  30*   URINEBILI  Negative  Negative   < >   --    < >  Negative   URINEKETONE  Negative  Negative   < >   --    < >  Negative   SG  1.018  1.012   < >   --    < >  1.016   UBLD  Negative  Trace*   < >   --    < >  Small*   URINEPH  5.0  5.0   < >   --    < >  5.0   PROTEIN  30*  30*   < >   --    < >  30*   NITRITE  Negative  Negative   < >   --    < >  Negative   LEUKEST  Negative  Negative   < >   --    < >  Negative   RBCU  0  5*   < >   --    < >  1   WBCU  5*  6*   < >   --    < >  1   UTPG   --    --    --   1.55*   --   0.41*    < > = values in this interval not displayed.          7/21/2017   SPECIMEN(S):   A: Colon biopsy, ascending   B: Colon biopsy, descending     FINAL DIAGNOSIS:   A. COLON BIOPSY, ASCENDING:   - Colonic mucosa with no significant histologic abnormality   - Negative for intraepithelial lymphocytosis or deposition of   subepithelial thick collagenous band     B. COLON BIOPSY, DESCENDING:   - Crypt architecture distortion, consistent with healed prior injury   - Negative for active inflammation or dysplasia   - Negative for intraepithelial lymphocytosis or deposition of  "  subepithelial thick collagenous band   - Please, see comment     COMMENT:   Specimen B, \"descending colon\" features lamina propria edema, slight   variation in crypt sizes and shapes and occasional crypt drop-out.  This   may indicate a resolving injury which could be drug-induced or   infectious.     CT scan 7/25/2017:   IMPRESSION:   1. New large heterogeneous area of right-sided retroperitoneal gas  which is highly concerning for an infectious process. Given the  history of prior neutropenic colitis and biopsies, there could also be  a component of stool from a ruptured viscus, despite the lack of  surrounding bowel loops and no extraluminal oral contrast. Surgical  consultation is recommended.      2. Bibasilar atelectasis versus consolidation with small bilateral  pleural effusions.     3. Extensive mesenteric and soft tissue edema with large volume  ascites. Numerous mesenteric and retroperitoneal lymph nodes which are  presumably reactive.     4. Postsurgical changes of liver transplant.     [Urgent Result: Retroperitoneal gas]     Finding was identified on 7/25/2017 5:34 PM.      Dr. Santoyo was contacted by Dr. Atikns at 7/25/2017 5:37 PM and  verbalized understanding of the urgent finding.      I have personally reviewed the examination and initial interpretation  and I agree with the findings.     LUCI HOROWITZ [facility]    Attestation:    The patient has been seen and evaluated by me.   Vital signs, labs, medications and orders were reviewed.   When obtained, diagnostic images were reviewed by me and interpreted as above.    The care plan was discussed with the multidisciplinary team and I agree with the findings and plan in this note, with any differences recorded in blue.    Immunosuppressive medication management was reviewed and adjusted as reflected in the note and orders.     .    "

## 2017-08-05 NOTE — PLAN OF CARE
Problem: Goal Outcome Summary  Goal: Goal Outcome Summary  Outcome: No Change  6680-2470: T.max 99. OVSS on RA. Pt disoriented to time and situation, pt on bed alarm throughout the shift for safety. Pt complaining of some lower back pain at the beginning of the shift, Aqua-K pad ordered and pt repositioned. Pt on full liquid diet and continuous tube feeds, no complaints of nausea. BS checks every 4 hours. BS check at 2000 was 62, pt given apple juice and recheck came up to 133. BS check at 0000 was 134, no insulin needed per order parameters. BS check at 0400 was 97, no insulin needed per order parameters. Triple Lumen IJ, all ports SL. PIV R Forearm SL. NJ tube with continuous tube feeds at 60 mL/hr with 50cc water flushes every hour. Ileostomy putting out moderate amounts of liquid brown stool, 875 mL emptied this shift. Pt has had three episodes of urine incontinence this shift and was able to use the urinal once, unable to obtain UA/UC this shift. Incision c/d/i, approximated with staples, and open to air. Wound on coccyx c/d/i and open to air, pt turned and repositioned every 2 hours. Pt up with assist x2 and mechanical lift, pt has been sleeping on and off throughout the night. Call light in reach. Will continue to monitor and follow plan of care.

## 2017-08-05 NOTE — PROGRESS NOTES
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  Now s/p ex lap with R hemicolectomy, end ileostomy, mucus fistula, partial omenectomy on 7/26.    Some low glucoses yesterday related to TF interrupted (and no D10 started), overall BG running tight even when TFs are running.    Plan:  - NPH decreased from 20 to 17 units qAM  -glargine decreased from 36 to 32 units qPM  -meal aspart 1unit/7g CHO ordered for meals and snacks  -aspart high correction q4h   -Please run D10 at nutritional support rate (~75ml/h) if TFs are interrupted (prn order)    Please notify diabetes team of changes planned for nutrition support schedule.     Outpatient diabetes follow up: Dr. Eckert at [facility]  Plan reviewed with pt and bedside nurse.             Interval History:   The last 24 hours progress and nursing notes reviewed.  Discussed with transplant and bedside RN.  Nepro at 60 cc/h continuous continues. Diet: honey thickened liquids.  Minimal po intake.  TFs were stopped last night at 1630 for abdominal CT and did not restart until 2100.  There is an order for prn D10 to run if TFs interrupted, but this was not started.  Glucose dropped to the 60s.  With TFs running continuously overnight BG still only in the 90s.  Yesterday, prior to TFs being stopped, BG in the low 100s.   Creatinine remains <1.  Transplant notes pt could be ready for TCU in a few days.      Recent Labs  Lab 08/05/17  1341 08/05/17  0858 08/05/17  0616 08/05/17  0358 08/05/17  0005 08/04/17  2106 08/04/17  2034  08/04/17  1721  08/04/17  0548  08/03/17  1705  08/03/17  0533  08/02/17  1728   GLC  --   --  92  --    --   --   --   --  102*  --  89  --  124*  --  163*  --  166*   * 104*  --  97 134* 133* 73  < >  --   < >  --   < >  --   < >  --   < >  --    < > = values in this interval not displayed.            Review of Systems:   See interval hx          Medications:   PTA taking Januvia and glargine versus glargine and aspart per carb    Active Diet Order      NPO for Medical/Clinical Reasons Except for: Meds     Physical Exam:  Gen:  Resting in bed, NAD.   HEENT: NC/AT,  NJ feeding tube  Resp: unlabored at rest  Neuro: alert, answers appropriately to questions ~50% today.    /80  Pulse 99  Temp 98.8  F (37.1  C)  Resp 20  Ht 1.829 m (6')  Wt 91.8 kg (202 lb 6.4 oz)  SpO2 93%  BMI 27.45 kg/m2           Data:     Lab Results   Component Value Date    A1C  07/06/2017     Canceled, Test credited   Below Assay Range  NOTIFIED LEONARD ONEILL AT 0538 ON 7/6/17 BY CHRISSY      A1C 9.4 02/16/2017    A1C 6.2 12/14/2016    A1C 6.1 10/27/2016    A1C 8.3 07/02/2012            Recent Labs   Lab Test  08/05/17   0616  08/04/17   1721   NA  139  141   POTASSIUM  5.2  5.1   CHLORIDE  115*  113*   CO2  18*  19*   ANIONGAP  5  8   GLC  92  102*   BUN  47*  50*   CR  0.90  0.88   JACOB  8.0*  8.0*     CBC RESULTS:   Recent Labs   Lab Test  08/05/17 0616   WBC  6.3   RBC  2.38*   HGB  6.9*   HCT  20.9*   MCV  88   MCH  29.0   MCHC  33.0   RDW  17.5*   PLT  94*       Giovana Villasenor PA-C [phone]  Diabetes Management job code [id number]

## 2017-08-06 ENCOUNTER — APPOINTMENT (OUTPATIENT)
Dept: PHYSICAL THERAPY | Facility: CLINIC | Age: 52
End: 2017-08-06
Attending: TRANSPLANT SURGERY
Payer: COMMERCIAL

## 2017-08-06 LAB
ANION GAP SERPL CALCULATED.3IONS-SCNC: 6 MMOL/L (ref 3–14)
ANION GAP SERPL CALCULATED.3IONS-SCNC: 8 MMOL/L (ref 3–14)
BACTERIA SPEC CULT: NO GROWTH
BASOPHILS # BLD AUTO: 0 10E9/L (ref 0–0.2)
BASOPHILS NFR BLD AUTO: 0.2 %
BUN SERPL-MCNC: 47 MG/DL (ref 7–30)
BUN SERPL-MCNC: 49 MG/DL (ref 7–30)
CALCIUM SERPL-MCNC: 7.9 MG/DL (ref 8.5–10.1)
CALCIUM SERPL-MCNC: 8.1 MG/DL (ref 8.5–10.1)
CHLORIDE SERPL-SCNC: 114 MMOL/L (ref 94–109)
CHLORIDE SERPL-SCNC: 115 MMOL/L (ref 94–109)
CO2 SERPL-SCNC: 18 MMOL/L (ref 20–32)
CO2 SERPL-SCNC: 19 MMOL/L (ref 20–32)
CREAT SERPL-MCNC: 0.95 MG/DL (ref 0.66–1.25)
CREAT SERPL-MCNC: 1.01 MG/DL (ref 0.66–1.25)
CRP SERPL-MCNC: 130 MG/L (ref 0–8)
DIFFERENTIAL METHOD BLD: ABNORMAL
EOSINOPHIL # BLD AUTO: 0 10E9/L (ref 0–0.7)
EOSINOPHIL NFR BLD AUTO: 0.6 %
ERYTHROCYTE [DISTWIDTH] IN BLOOD BY AUTOMATED COUNT: 16.7 % (ref 10–15)
GFR SERPL CREATININE-BSD FRML MDRD: 77 ML/MIN/1.7M2
GFR SERPL CREATININE-BSD FRML MDRD: 83 ML/MIN/1.7M2
GLUCOSE BLDC GLUCOMTR-MCNC: 119 MG/DL (ref 70–99)
GLUCOSE BLDC GLUCOMTR-MCNC: 121 MG/DL (ref 70–99)
GLUCOSE BLDC GLUCOMTR-MCNC: 134 MG/DL (ref 70–99)
GLUCOSE BLDC GLUCOMTR-MCNC: 136 MG/DL (ref 70–99)
GLUCOSE BLDC GLUCOMTR-MCNC: 95 MG/DL (ref 70–99)
GLUCOSE BLDC GLUCOMTR-MCNC: 99 MG/DL (ref 70–99)
GLUCOSE SERPL-MCNC: 108 MG/DL (ref 70–99)
GLUCOSE SERPL-MCNC: 98 MG/DL (ref 70–99)
HCT VFR BLD AUTO: 23.2 % (ref 40–53)
HGB BLD-MCNC: 7.6 G/DL (ref 13.3–17.7)
HGB BLD-MCNC: 7.9 G/DL (ref 13.3–17.7)
HGB BLD-MCNC: 8.3 G/DL (ref 13.3–17.7)
IMM GRANULOCYTES # BLD: 0 10E9/L (ref 0–0.4)
IMM GRANULOCYTES NFR BLD: 0.2 %
LYMPHOCYTES # BLD AUTO: 1.5 10E9/L (ref 0.8–5.3)
LYMPHOCYTES NFR BLD AUTO: 28.2 %
Lab: NORMAL
MAGNESIUM SERPL-MCNC: 2.2 MG/DL (ref 1.6–2.3)
MCH RBC QN AUTO: 28.8 PG (ref 26.5–33)
MCHC RBC AUTO-ENTMCNC: 32.8 G/DL (ref 31.5–36.5)
MCV RBC AUTO: 88 FL (ref 78–100)
MICRO REPORT STATUS: NORMAL
MONOCYTES # BLD AUTO: 0.2 10E9/L (ref 0–1.3)
MONOCYTES NFR BLD AUTO: 3.9 %
NEUTROPHILS # BLD AUTO: 3.5 10E9/L (ref 1.6–8.3)
NEUTROPHILS NFR BLD AUTO: 66.9 %
NRBC # BLD AUTO: 0 10*3/UL
NRBC BLD AUTO-RTO: 0 /100
PHOSPHATE SERPL-MCNC: 4.7 MG/DL (ref 2.5–4.5)
PLATELET # BLD AUTO: 94 10E9/L (ref 150–450)
POTASSIUM SERPL-SCNC: 5.4 MMOL/L (ref 3.4–5.3)
POTASSIUM SERPL-SCNC: 5.4 MMOL/L (ref 3.4–5.3)
RBC # BLD AUTO: 2.64 10E12/L (ref 4.4–5.9)
SODIUM SERPL-SCNC: 140 MMOL/L (ref 133–144)
SODIUM SERPL-SCNC: 141 MMOL/L (ref 133–144)
SPECIMEN SOURCE: NORMAL
TACROLIMUS BLD-MCNC: ABNORMAL UG/L (ref 5–15)
TME LAST DOSE: ABNORMAL H
WBC # BLD AUTO: 5.2 10E9/L (ref 4–11)

## 2017-08-06 PROCEDURE — 85018 HEMOGLOBIN: CPT | Performed by: TRANSPLANT SURGERY

## 2017-08-06 PROCEDURE — 83735 ASSAY OF MAGNESIUM: CPT | Performed by: TRANSPLANT SURGERY

## 2017-08-06 PROCEDURE — 00000146 ZZHCL STATISTIC GLUCOSE BY METER IP

## 2017-08-06 PROCEDURE — 97530 THERAPEUTIC ACTIVITIES: CPT | Mod: GP | Performed by: PHYSICAL THERAPIST

## 2017-08-06 PROCEDURE — 80197 ASSAY OF TACROLIMUS: CPT | Performed by: PHYSICIAN ASSISTANT

## 2017-08-06 PROCEDURE — 25000132 ZZH RX MED GY IP 250 OP 250 PS 637: Performed by: STUDENT IN AN ORGANIZED HEALTH CARE EDUCATION/TRAINING PROGRAM

## 2017-08-06 PROCEDURE — 25000132 ZZH RX MED GY IP 250 OP 250 PS 637: Performed by: PHYSICIAN ASSISTANT

## 2017-08-06 PROCEDURE — 25000128 H RX IP 250 OP 636: Performed by: PHYSICIAN ASSISTANT

## 2017-08-06 PROCEDURE — 27210432 ZZH NUTRITION PRODUCT RENAL BASIC LITER

## 2017-08-06 PROCEDURE — 25000132 ZZH RX MED GY IP 250 OP 250 PS 637: Performed by: SURGERY

## 2017-08-06 PROCEDURE — 84100 ASSAY OF PHOSPHORUS: CPT | Performed by: TRANSPLANT SURGERY

## 2017-08-06 PROCEDURE — 25000131 ZZH RX MED GY IP 250 OP 636 PS 637: Performed by: PHYSICIAN ASSISTANT

## 2017-08-06 PROCEDURE — 27210913 ZZH NUTRITION PRODUCT INTERMEDIATE PACKET

## 2017-08-06 PROCEDURE — 25000128 H RX IP 250 OP 636: Performed by: STUDENT IN AN ORGANIZED HEALTH CARE EDUCATION/TRAINING PROGRAM

## 2017-08-06 PROCEDURE — 86140 C-REACTIVE PROTEIN: CPT | Performed by: TRANSPLANT SURGERY

## 2017-08-06 PROCEDURE — 12000025 ZZH R&B TRANSPLANT INTERMEDIATE

## 2017-08-06 PROCEDURE — 36592 COLLECT BLOOD FROM PICC: CPT | Performed by: TRANSPLANT SURGERY

## 2017-08-06 PROCEDURE — 85025 COMPLETE CBC W/AUTO DIFF WBC: CPT | Performed by: TRANSPLANT SURGERY

## 2017-08-06 PROCEDURE — 25000132 ZZH RX MED GY IP 250 OP 250 PS 637: Performed by: TRANSPLANT SURGERY

## 2017-08-06 PROCEDURE — 36592 COLLECT BLOOD FROM PICC: CPT | Performed by: STUDENT IN AN ORGANIZED HEALTH CARE EDUCATION/TRAINING PROGRAM

## 2017-08-06 PROCEDURE — 40000193 ZZH STATISTIC PT WARD VISIT: Performed by: PHYSICAL THERAPIST

## 2017-08-06 PROCEDURE — 80048 BASIC METABOLIC PNL TOTAL CA: CPT | Performed by: TRANSPLANT SURGERY

## 2017-08-06 PROCEDURE — 85018 HEMOGLOBIN: CPT | Performed by: STUDENT IN AN ORGANIZED HEALTH CARE EDUCATION/TRAINING PROGRAM

## 2017-08-06 PROCEDURE — 25000131 ZZH RX MED GY IP 250 OP 636 PS 637: Performed by: STUDENT IN AN ORGANIZED HEALTH CARE EDUCATION/TRAINING PROGRAM

## 2017-08-06 RX ORDER — FUROSEMIDE 10 MG/ML
40 INJECTION INTRAMUSCULAR; INTRAVENOUS ONCE
Status: COMPLETED | OUTPATIENT
Start: 2017-08-06 | End: 2017-08-06

## 2017-08-06 RX ORDER — FLUDROCORTISONE ACETATE 0.1 MG/1
0.1 TABLET ORAL DAILY
Status: DISCONTINUED | OUTPATIENT
Start: 2017-08-06 | End: 2017-08-09

## 2017-08-06 RX ORDER — FUROSEMIDE 10 MG/ML
20 INJECTION INTRAMUSCULAR; INTRAVENOUS ONCE
Status: DISCONTINUED | OUTPATIENT
Start: 2017-08-06 | End: 2017-08-06

## 2017-08-06 RX ORDER — DIPHENOXYLATE HCL/ATROPINE 2.5-.025/5
2.5 LIQUID (ML) ORAL 2 TIMES DAILY
Status: DISCONTINUED | OUTPATIENT
Start: 2017-08-06 | End: 2017-08-07

## 2017-08-06 RX ADMIN — LOPERAMIDE HYDROCHLORIDE 4 MG: 2 SOLUTION ORAL at 08:52

## 2017-08-06 RX ADMIN — SULFAMETHOXAZOLE AND TRIMETHOPRIM 1 TABLET: 400; 80 TABLET ORAL at 08:53

## 2017-08-06 RX ADMIN — MULTIVIT AND MINERALS-FERROUS GLUCONATE 9 MG IRON/15 ML ORAL LIQUID 15 ML: at 08:52

## 2017-08-06 RX ADMIN — FUROSEMIDE 40 MG: 10 INJECTION, SOLUTION INTRAVENOUS at 11:35

## 2017-08-06 RX ADMIN — Medication 1 PACKET: at 13:41

## 2017-08-06 RX ADMIN — OXYCODONE HYDROCHLORIDE 10 MG: 5 SOLUTION ORAL at 03:13

## 2017-08-06 RX ADMIN — LOPERAMIDE HYDROCHLORIDE 4 MG: 2 SOLUTION ORAL at 17:24

## 2017-08-06 RX ADMIN — PANTOPRAZOLE SODIUM 40 MG: 40 TABLET, DELAYED RELEASE ORAL at 08:52

## 2017-08-06 RX ADMIN — GANCICLOVIR SODIUM 650 MG: 500 INJECTION, POWDER, LYOPHILIZED, FOR SOLUTION INTRAVENOUS at 20:34

## 2017-08-06 RX ADMIN — LOPERAMIDE HYDROCHLORIDE 4 MG: 2 SOLUTION ORAL at 20:35

## 2017-08-06 RX ADMIN — Medication 1 PACKET: at 08:56

## 2017-08-06 RX ADMIN — TACROLIMUS 8 MG: 5 CAPSULE ORAL at 19:10

## 2017-08-06 RX ADMIN — HEPARIN SODIUM 5000 UNITS: 5000 INJECTION, SOLUTION INTRAVENOUS; SUBCUTANEOUS at 17:24

## 2017-08-06 RX ADMIN — Medication 1 PACKET: at 20:34

## 2017-08-06 RX ADMIN — Medication 2.5 ML: at 20:34

## 2017-08-06 RX ADMIN — FLUDROCORTISONE ACETATE 0.1 MG: 0.1 TABLET ORAL at 11:35

## 2017-08-06 RX ADMIN — TACROLIMUS 6 MG: 5 CAPSULE ORAL at 08:52

## 2017-08-06 RX ADMIN — HEPARIN SODIUM 5000 UNITS: 5000 INJECTION, SOLUTION INTRAVENOUS; SUBCUTANEOUS at 08:53

## 2017-08-06 RX ADMIN — OXYCODONE HYDROCHLORIDE 10 MG: 5 SOLUTION ORAL at 10:55

## 2017-08-06 RX ADMIN — LOPERAMIDE HYDROCHLORIDE 4 MG: 2 SOLUTION ORAL at 13:41

## 2017-08-06 RX ADMIN — OXYCODONE HYDROCHLORIDE 10 MG: 5 SOLUTION ORAL at 22:13

## 2017-08-06 RX ADMIN — Medication 80 MG: at 08:51

## 2017-08-06 RX ADMIN — GANCICLOVIR SODIUM 650 MG: 500 INJECTION, POWDER, LYOPHILIZED, FOR SOLUTION INTRAVENOUS at 08:55

## 2017-08-06 NOTE — PROGRESS NOTES
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  Now s/p ex lap with R hemicolectomy, end ileostomy, mucus fistula, partial omenectomy on 7/26.    Glucoses still trending lower overnight (90s), even with no interruptions in TFs and lower glargine dose.  Daytime glucoses stable in mid 100s.      Plan:  - NPH 17 units qAM  -glargine decreased from 32 to 28 units qPM  -meal aspart 1unit/7g CHO ordered for meals and snacks  -aspart high correction q4h   -Please run D10 at nutritional support rate (~75ml/h) if TFs are interrupted (prn order)    Please notify diabetes team of changes planned for nutrition support schedule.     Outpatient diabetes follow up: Dr. Eckert at [facility]  Plan reviewed with pt and bedside nurse.             Interval History:   The last 24 hours progress and nursing notes reviewed.  Discussed with transplant and bedside RN.  Nepro at 60 cc/h continuous continues. Diet: back on full liq diet (was NPO yesterday).  Camacho complained of feeling hungry and wanted diet restarted (was restarted around that time).  Overnight glucoses still trending lower despite ongoing reductions in glargine dose.  Daytime glucoses ok yesterday (mid 100s) even with being NPO.  Hemoglobin improved today following transfusion yesterday.  Creatinine remains <1.      Recent Labs  Lab 08/06/17  1558 08/06/17  1339 08/06/17  0805 08/06/17  0633 08/06/17  0404 08/05/17  2351 08/05/17  2001  08/05/17  0616  08/04/17  1721  08/04/17  0548  08/03/17  1705  08/03/17  0533   GLC  --   --   --  98  --   --   --   --  92  --  102*  --  89  --  124*  --  163*   * 121* 95  --   99 110* 127*  < >  --   < >  --   < >  --   < >  --   < >  --    < > = values in this interval not displayed.            Review of Systems:   See interval hx          Medications:   PTA taking Januvia and glargine versus glargine and aspart per carb    Active Diet Order      Full Liquid Diet     Physical Exam:  Gen:  Resting in bed, NAD.   HEENT: NC/AT,  NJ feeding tube  Resp: unlabored at rest  Neuro: alert, answers appropriately to questions ~50% today.    /83 (BP Location: Right arm)  Pulse 91  Temp 97.4  F (36.3  C) (Axillary)  Resp 16  Ht 1.829 m (6')  Wt 92.6 kg (204 lb 1.6 oz)  SpO2 97%  BMI 27.68 kg/m2           Data:     Lab Results   Component Value Date    A1C  07/06/2017     Canceled, Test credited   Below Assay Range  NOTIFIED LEONARD ONEILL AT 0538 ON 7/6/17 BY CHRISSY      A1C 9.4 02/16/2017    A1C 6.2 12/14/2016    A1C 6.1 10/27/2016    A1C 8.3 07/02/2012            Recent Labs   Lab Test  08/06/17   0633  08/05/17   0616   NA  141  139   POTASSIUM  5.4*  5.2   CHLORIDE  114*  115*   CO2  19*  18*   ANIONGAP  8  5   GLC  98  92   BUN  47*  47*   CR  0.95  0.90   JACOB  8.1*  8.0*     CBC RESULTS:   Recent Labs   Lab Test  08/06/17   1207  08/06/17   0633   WBC   --   5.2   RBC   --   2.64*   HGB  8.3*  7.6*   HCT   --   23.2*   MCV   --   88   MCH   --   28.8   MCHC   --   32.8   RDW   --   16.7*   PLT   --   94*       Giovana Villasenor PA-C [phone]  Diabetes Management job code [id number]

## 2017-08-06 NOTE — PROVIDER NOTIFICATION
Just now verbally notified Dr. Alexis Garcia that Camacho' potassium level=5.4 and that his hemoglobin=7.6, down from 7.9 last night, after 2 units of blood had been given (which had gone up from 6.9, the value before the blood was given).

## 2017-08-06 NOTE — PLAN OF CARE
Problem: Goal Outcome Summary  Goal: Goal Outcome Summary  PT 7A: Pt transfers sit>stand with min A x 2, tolerates standing 5 minutes + 2 minutes with FWW and CGA x 2. Pt took 2 small steps forward with FWW, then declines ambulation in hallway. Pt particular with transfer technique, variable receptiveness to PT cues for safe hand placement, importance of walking for activity/exercise. Pt confused, makes illogical statements throughout session, difficult time keeping attention to tasks. Recommend A x 2 for pivot transfers, use of chair alarm 2/2 fall risk and impulsivity.     Recommend: TCU

## 2017-08-06 NOTE — PLAN OF CARE
"Problem: Goal Outcome Summary  Goal: Goal Outcome Summary  Outcome: No Change  D: Camacho has been verbalizing strange ideations this morning, like going to \" [his] daughter in [facility].\"  VSS. No c/o pain.  I:   Reorientation given about his circumstances often.  PT and staff got him OOB three times.  A:  Tolerates activity, but remains impulsive and confused.  Bed alarm continues to be necessary.      "

## 2017-08-06 NOTE — PLAN OF CARE
"Problem: Goal Outcome Summary  Goal: Goal Outcome Summary  Outcome: No Change  D: Camacho' blood pressures ranging between 140's-150's over 70's to 80's.  Afebrile.  He has been refusing to turn or reposition in bed, staying in a position that looks uncomfortable, but which he says is very comfortable, and \"don't move me, thank you.\"  I:   He got up OOB with PT this morning and seemed to tolerate that well.  He also was helped to the chair in the room (bed alarm on), and he did okay with that for ~ 1/2 hr.  P:  He keeps saying he needs to leave, requiring continued reorientation.      "

## 2017-08-06 NOTE — PLAN OF CARE
Problem: Goal Outcome Summary  Goal: Goal Outcome Summary  Outcome: No Change  [id number]: Afebrile. VSS on RA. Pt complaining of some abdominal pain over the night, PRN Oxycodone given x1. Pt NPO (asked on-call about advancing patient back to full liquid diet and stated she would wait for morning team to decide) and continuous tube feeds, no complaints of nausea. BS checks every 4 hours. BS check at 2000 was 127, no insulin needed per order parameters. BS check at 0000 was 110, no insulin needed per order parameters. BS check at 0400 was 99, no insulin needed per order parameters. Triple Lumen IJ SL. PIV L Forearm SL. Pt did received second unit of blood this shift, tolerated well without any signs and symptoms of reaction. Hemoglobin recheck 7.9, on-call MD notified. NJ tube with continuous tube feeds at 60 mL/hr with 50cc water flushes every hour. Ileostomy putting out moderate amounts of brown, watery stool, 400 mL emptied so far this shift. Pt voiding adequate amounts, pt was able to use the urinal twice this shift otherwise the patient has had two episodes of urine incontinence. Incision c/d/i, approximated with staples, no drainage, and open to air. Coccyx wound covered with Mepilex dressing. Pt disoriented to time, place, and situation. Pt continues to be on bed alarm and VPM for safety, pt has been pulling on lines intermittently throughout the night. Call light in reach. Will continue to monitor and follow plan of care.

## 2017-08-06 NOTE — PLAN OF CARE
Problem: Goal Outcome Summary  Goal: Goal Outcome Summary  Outcome: Improving  D: Camacho' hemoglobin level of 6.9 was reported to team early in the morning, but  I:   an order for blood did not occur until later in the day.  Early this evening he received the first of two units of blood, with no reaction noted.  Also, a diagnostic paracentesis was performed bedside at the same time.  Cultures and other tests sent to lab.  P:  Looking for more source(s) of infection...

## 2017-08-06 NOTE — PROVIDER NOTIFICATION
On-Call MD paged about patients hemoglobin recheck of 7.9. Hemoglobin up from 6.9 following infusion of 2 units of blood.    Will continue to monitor and inform MD about changes in the patients condition.

## 2017-08-06 NOTE — PROGRESS NOTES
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 Concern for CMV colitis with low count CMV viremia/GI PTLD and subsequently underwent colonoscopy on 7/21, random biopsies obtained.  On 7/25/2017 patient had respiratory distress, abdominal compartment syndrome and EJ with oliguria. Broad spectrum antibiotics were started.  He was intubated and had a paracentesis with 5L removed. He did not required pressors.  CT was obtained which showed a new large heterogeneous right sided retroperitoneal gas and air tracking along duodenum.  No contrast extravasation.  No intraperitoneal air noted. Colorectal surgery was consulted. Initial conservative management with bowel rest and IV abx. Pt continued to spike fevers despite IV abx.  Now s/p Exploratory laparotomy, right angelique-colectomy, end ileostomy, mucus fistula, partial omentectomy on 7/26.    Graft Function: Good function. LFTs WNL. US liver unremarkable.  Immunosuppression Management:   CellCept discontinued (6/27/17) due to neutropenia.   Prograf goal ~8 due to single IS. Level < 3. Repeat level < 3. Increased to 6 mg BID.   Cardiorespiratory: Stable respiratory status, NLB on RA  Hematology: Pancytopenia on admission, acute on chronic, secondary to medications (MMF, bactrim, valcyte). Improved with holding medications and neupogen. 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  Continue to hold MMF and bactrim.   -Anemia- Hemoglobin stable. WBC stable. Last blood transfusion on 7/26.   -Cytology ascites fluid, negative for malignancy   GI:  Neutropenic colitis. Colonoscopy 7/21. Biopsy results showing resolving injury secondary to possible drug induced vs infectious.   -CT scan abd/pelvis, po contrast, showed a new large heterogeneous right sided retroperitoneal gas and air tracking along duodenum.  No contrast extravasation.  No intraperitoneal air noted. Colorectal surgery consulted.  Continued to spike high grade temperature despite IV antibiotics therefore patient taken to OR. s/p Exploratory laparotomy, right angelique-colectomy, end ileostomy, mucus fistula, partial omentectomy on 7/26. Findings no neida perforation, phlegmon/inflammationright colon. Colon pathology suggested colon perforation, negative for malignancy; CMV stain positive.  WOCN consult for ostomy care.   -Full liquid diet. TF at goal.   Large output from the iliostomy - Output remains higher than goal. Goal ileostomy output ~ 1200 cc in 24 hrs.   loperamide 2mg q6H. Continue fiber TID.  Continue Lomotil BID, 2.5 mg.  Fluid/Electrolytes/Renal: EJ oliguric likely sec to high intraabdominal pressures and ischemic ATN sec to sepsis. Nephrology consulted. No indication for RRT presently. UO good, Creatinine 0.9.  Hypernatremia, free water deficit, high ileostomy output. Ileostomy output now decreased.  Na 150->147->145 ->143 with  cc per hour, decrease to 50 q1H now with Na corrected, now with hyperkalemia, restart florinef  Endocrine: DM type 2. Endocrine consulting for glycemic management.  Infectious disease:   -Severe sepsis, right colon phlegmon. Meropenem/daptomycin/micafungin tx x 10 days completed on 8/3. S/p right angelique-colectomy. Path CMV stain +.  Fluid cx x 2 NGTD.  -CMV viremia, CMV colitis per pathology. CMV D+R-. Continue IV ganciclovir. CMV PCR decreasing now. Follow CMV PCR weekly- next due 8/3, negative.   EBV viremia, repeat EBV PCR  8/1 6864. Monitor q2 weeks.  -7/25 Ucx, Bcx and ascites cx negative to date  -ID consulting.   - Persistent RP gaseous process- will  discuss with IR regarding intervention.  - Peritoneal enhancement on CT on 8/4, Paracentesis done f/u cultures  Neuro: Toxic metabolic encephalopathy secondary to infection, prolonged hospital stay, significant improvement.  Vascular: Right Arterial line clot 2/2 previous arterial line. Vascular consulted. Recommend ASA only. Some evidence of microvascular injury in digits (toes) this is most likely due to injury while patient was on pressor therapy and sepsis. Now with a third toe injury, vascular consulted again. US completed.  ECHO EF 60-65%. 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   Activity: Deconditioned due to prolong hospital course. Daily PT/OT, encourage pt to be OOB to chair. Encourage pulmonary toilet.   Prophylaxis: DVT-Heparin SQ  Disposition: 7A. Will transfer to TCU for rehab once status stable. Possible transfer in a few days.       Medical Decision Making: Medium  Admit [id number] (moderate level decision making)    PILLO/Fellow/Resident Provider:   Perico Garcia   Surgery PGY3  [phone]    Faculty: Chaparro Anderson M.D.    __________________________________________________________________  Transplant History:.  3/4/2017 DD OLT with Dr. Tran (Liver), Postoperative day: 155     Interval History:   Overnight events: No complaints. Patient oriented, feels better.    ROS:   A 10-point review of systems was negative except as noted above.    Meds:    insulin glargine  28 Units Subcutaneous Q24H     diphenoxylate-atropine  2.5 mL Oral BID     furosemide  40 mg Intravenous Once     fludrocortisone  0.1 mg Oral Daily     insulin isophane human  17 Units Subcutaneous QAM     sodium chloride (PF)  3 mL Intracatheter Q8H     ganciclovir (CYTOVENE) intermittent infusion  7.5 mg/kg Intravenous Q12H     sulfamethoxazole-trimethoprim  1 tablet Oral Daily     loperamide  4 mg Oral or Feeding Tube 4x Daily     tacrolimus  6 mg Oral BID IS     insulin aspart  1-12 Units  Subcutaneous Q4H     fiber modular  1 packet Per Feeding Tube TID     insulin aspart   Subcutaneous TID w/meals     protein modular  1 packet Per Feeding Tube TID     aspirin  80 mg Oral Daily     multivitamins with minerals  15 mL Per Feeding Tube Daily     heparin  5,000 Units Subcutaneous Q8H     pantoprazole  40 mg Oral or Feeding Tube Daily     sodium chloride (PF)  3 mL Intracatheter Q8H       Physical Exam:     Admit Weight: 94.2 kg (207 lb 11.2 oz) (SCALE 2)    Current vitals:   /85 (BP Location: Right arm)  Pulse 91  Temp 98.4  F (36.9  C) (Oral)  Resp 16  Ht 1.829 m (6')  Wt 91.8 kg (202 lb 6.4 oz)  SpO2 95%  BMI 27.45 kg/m2    Vital sign ranges:    Temp:  [98  F (36.7  C)-99.8  F (37.7  C)] 98.4  F (36.9  C)  Pulse:  [91-95] 91  Heart Rate:  [82-94] 92  Resp:  [16-20] 16  BP: (137-154)/(72-87) 150/85  SpO2:  [93 %-96 %] 95 %  Patient Vitals for the past 24 hrs:   BP Temp Temp src Pulse Heart Rate Resp SpO2   08/06/17 1055 150/85 98.4  F (36.9  C) Oral - 92 16 95 %   08/06/17 0713 148/78 98.3  F (36.8  C) - - 82 20 93 %   08/06/17 0405 152/83 98.2  F (36.8  C) Oral - 91 18 94 %   08/05/17 2316 145/82 98.5  F (36.9  C) Oral - - 16 95 %   08/05/17 2244 153/81 99  F (37.2  C) Oral - 94 16 95 %   08/05/17 2206 146/83 98.5  F (36.9  C) Oral - 91 16 94 %   08/05/17 2110 137/72 99.1  F (37.3  C) Axillary 91 - 16 96 %   08/05/17 2009 154/87 98.7  F (37.1  C) Oral - 92 16 95 %   08/05/17 1946 147/78 99.5  F (37.5  C) Oral - 92 16 96 %   08/05/17 1734 154/77 99.8  F (37.7  C) Oral - 91 16 96 %   08/05/17 1633 147/79 98  F (36.7  C) Oral - 91 18 96 %   08/05/17 1611 144/81 99.1  F (37.3  C) Oral - 92 16 95 %   08/05/17 1518 145/80 - - 95 - - 96 %   08/05/17 1123 145/80 98.8  F (37.1  C) - - 93 20 93 %     General Appearance: NAD  Skin: normal, warm, dry  Heart: RRR  Lungs: B/l decreased bases, shallow breathing.  Abdomen: soft, non distended. Ileostomy with brown output. Mucus fistula, pink. Midline  incision, c/d/i. Chevron incision well healed.  : No vazquez.   Extremities: No edema. Ext NT x 4.  Neurologic: A&O x 3.       Data:   CMP    Recent Labs  Lab 08/06/17  0633 08/05/17  0616  08/02/17  0543    139  < > 150*   POTASSIUM 5.4* 5.2  < > 4.5   CHLORIDE 114* 115*  < > 123*   CO2 19* 18*  < > 21   GLC 98 92  < > 112*   BUN 47* 47*  < > 49*   CR 0.95 0.90  < > 0.91   GFRESTIMATED 83 88  < > 87   GFRESTBLACK >90African American GFR Calc >90African American GFR Calc  < > >90African American GFR Calc   JACOB 8.1* 8.0*  < > 8.0*   MAG 2.2 2.3  < > 2.0   PHOS 4.7* 4.1  < > 3.2   ALBUMIN  --   --   --  2.3*   BILITOTAL  --   --   --  0.4   ALKPHOS  --   --   --  199*   AST  --   --   --  62*   ALT  --   --   --  42   < > = values in this interval not displayed.  CBC    Recent Labs  Lab 08/06/17  0633 08/06/17  0111 08/05/17  0616   HGB 7.6* 7.9* 6.9*   WBC 5.2  --  6.3   PLT 94*  --  94*     COAGS    Recent Labs  Lab 08/05/17  0949   INR 1.19*      Urinalysis  Recent Labs   Lab Test  08/05/17   0617  07/28/17   1042   06/14/17   1508   04/11/16   1345   COLOR  Yellow  Yellow   < >   --    < >  Yellow   APPEARANCE  Slightly Cloudy  Slightly Cloudy   < >   --    < >  Clear   URINEGLC  Negative  Negative   < >   --    < >  30*   URINEBILI  Negative  Negative   < >   --    < >  Negative   URINEKETONE  Negative  Negative   < >   --    < >  Negative   SG  1.018  1.012   < >   --    < >  1.016   UBLD  Negative  Trace*   < >   --    < >  Small*   URINEPH  5.0  5.0   < >   --    < >  5.0   PROTEIN  30*  30*   < >   --    < >  30*   NITRITE  Negative  Negative   < >   --    < >  Negative   LEUKEST  Negative  Negative   < >   --    < >  Negative   RBCU  0  5*   < >   --    < >  1   WBCU  5*  6*   < >   --    < >  1   UTPG   --    --    --   1.55*   --   0.41*    < > = values in this interval not displayed.          7/21/2017   SPECIMEN(S):   A: Colon biopsy, ascending   B: Colon biopsy, descending     FINAL DIAGNOSIS:  "  A. COLON BIOPSY, ASCENDING:   - Colonic mucosa with no significant histologic abnormality   - Negative for intraepithelial lymphocytosis or deposition of   subepithelial thick collagenous band     B. COLON BIOPSY, DESCENDING:   - Crypt architecture distortion, consistent with healed prior injury   - Negative for active inflammation or dysplasia   - Negative for intraepithelial lymphocytosis or deposition of   subepithelial thick collagenous band   - Please, see comment     COMMENT:   Specimen B, \"descending colon\" features lamina propria edema, slight   variation in crypt sizes and shapes and occasional crypt drop-out.  This   may indicate a resolving injury which could be drug-induced or   infectious.     CT scan 7/25/2017:   IMPRESSION:   1. New large heterogeneous area of right-sided retroperitoneal gas  which is highly concerning for an infectious process. Given the  history of prior neutropenic colitis and biopsies, there could also be  a component of stool from a ruptured viscus, despite the lack of  surrounding bowel loops and no extraluminal oral contrast. Surgical  consultation is recommended.      2. Bibasilar atelectasis versus consolidation with small bilateral  pleural effusions.     3. Extensive mesenteric and soft tissue edema with large volume  ascites. Numerous mesenteric and retroperitoneal lymph nodes which are  presumably reactive.     4. Postsurgical changes of liver transplant.     [Urgent Result: Retroperitoneal gas]     Finding was identified on 7/25/2017 5:34 PM.      Dr. Santoyo was contacted by Dr. Atkins at 7/25/2017 5:37 PM and  verbalized understanding of the urgent finding.      I have personally reviewed the examination and initial interpretation  and I agree with the findings.     LUCI HORWOITZ [facility]    Attestation:    The patient has been seen and evaluated by me.   Vital signs, labs, medications and orders were reviewed.   When obtained, diagnostic images were reviewed by me " and interpreted as above.    The care plan was discussed with the multidisciplinary team and I agree with the findings and plan in this note, with any differences recorded in blue.    Immunosuppressive medication management was reviewed and adjusted as reflected in the note and orders.     .

## 2017-08-07 ENCOUNTER — APPOINTMENT (OUTPATIENT)
Dept: SPEECH THERAPY | Facility: CLINIC | Age: 52
End: 2017-08-07
Attending: TRANSPLANT SURGERY
Payer: COMMERCIAL

## 2017-08-07 LAB
ANION GAP SERPL CALCULATED.3IONS-SCNC: 8 MMOL/L (ref 3–14)
BASOPHILS # BLD AUTO: 0 10E9/L (ref 0–0.2)
BASOPHILS NFR BLD AUTO: 0.2 %
BUN SERPL-MCNC: 51 MG/DL (ref 7–30)
CALCIUM SERPL-MCNC: 8.2 MG/DL (ref 8.5–10.1)
CHLORIDE SERPL-SCNC: 114 MMOL/L (ref 94–109)
CO2 SERPL-SCNC: 18 MMOL/L (ref 20–32)
CREAT SERPL-MCNC: 1.06 MG/DL (ref 0.66–1.25)
CRP SERPL-MCNC: 130 MG/L (ref 0–8)
DIFFERENTIAL METHOD BLD: ABNORMAL
EOSINOPHIL # BLD AUTO: 0.1 10E9/L (ref 0–0.7)
EOSINOPHIL NFR BLD AUTO: 0.9 %
ERYTHROCYTE [DISTWIDTH] IN BLOOD BY AUTOMATED COUNT: 17.5 % (ref 10–15)
GFR SERPL CREATININE-BSD FRML MDRD: 73 ML/MIN/1.7M2
GLUCOSE BLDC GLUCOMTR-MCNC: 133 MG/DL (ref 70–99)
GLUCOSE BLDC GLUCOMTR-MCNC: 160 MG/DL (ref 70–99)
GLUCOSE BLDC GLUCOMTR-MCNC: 163 MG/DL (ref 70–99)
GLUCOSE BLDC GLUCOMTR-MCNC: 178 MG/DL (ref 70–99)
GLUCOSE BLDC GLUCOMTR-MCNC: 192 MG/DL (ref 70–99)
GLUCOSE BLDC GLUCOMTR-MCNC: 99 MG/DL (ref 70–99)
GLUCOSE SERPL-MCNC: 99 MG/DL (ref 70–99)
HCT VFR BLD AUTO: 23.7 % (ref 40–53)
HGB BLD-MCNC: 7.7 G/DL (ref 13.3–17.7)
IMM GRANULOCYTES # BLD: 0 10E9/L (ref 0–0.4)
IMM GRANULOCYTES NFR BLD: 0.2 %
LACTATE BLD-SCNC: 0.8 MMOL/L (ref 0.7–2.1)
LYMPHOCYTES # BLD AUTO: 1.5 10E9/L (ref 0.8–5.3)
LYMPHOCYTES NFR BLD AUTO: 27.4 %
MAGNESIUM SERPL-MCNC: 2.1 MG/DL (ref 1.6–2.3)
MCH RBC QN AUTO: 28.4 PG (ref 26.5–33)
MCHC RBC AUTO-ENTMCNC: 32.5 G/DL (ref 31.5–36.5)
MCV RBC AUTO: 88 FL (ref 78–100)
MONOCYTES # BLD AUTO: 0.2 10E9/L (ref 0–1.3)
MONOCYTES NFR BLD AUTO: 3.6 %
NEUTROPHILS # BLD AUTO: 3.7 10E9/L (ref 1.6–8.3)
NEUTROPHILS NFR BLD AUTO: 67.7 %
NRBC # BLD AUTO: 0 10*3/UL
NRBC BLD AUTO-RTO: 0 /100
PHOSPHATE SERPL-MCNC: 4.9 MG/DL (ref 2.5–4.5)
PLATELET # BLD AUTO: 116 10E9/L (ref 150–450)
POTASSIUM SERPL-SCNC: 5.4 MMOL/L (ref 3.4–5.3)
RBC # BLD AUTO: 2.71 10E12/L (ref 4.4–5.9)
SODIUM SERPL-SCNC: 139 MMOL/L (ref 133–144)
TACROLIMUS BLD-MCNC: ABNORMAL UG/L (ref 5–15)
TME LAST DOSE: ABNORMAL H
TROPONIN I SERPL-MCNC: 0.02 UG/L (ref 0–0.04)
WBC # BLD AUTO: 5.5 10E9/L (ref 4–11)

## 2017-08-07 PROCEDURE — 40000225 ZZH STATISTIC SLP WARD VISIT

## 2017-08-07 PROCEDURE — 25000132 ZZH RX MED GY IP 250 OP 250 PS 637: Performed by: STUDENT IN AN ORGANIZED HEALTH CARE EDUCATION/TRAINING PROGRAM

## 2017-08-07 PROCEDURE — 25000128 H RX IP 250 OP 636: Performed by: STUDENT IN AN ORGANIZED HEALTH CARE EDUCATION/TRAINING PROGRAM

## 2017-08-07 PROCEDURE — 25000131 ZZH RX MED GY IP 250 OP 636 PS 637: Performed by: TRANSPLANT SURGERY

## 2017-08-07 PROCEDURE — 99213 OFFICE O/P EST LOW 20 MIN: CPT

## 2017-08-07 PROCEDURE — 25000128 H RX IP 250 OP 636: Performed by: NURSE PRACTITIONER

## 2017-08-07 PROCEDURE — 83735 ASSAY OF MAGNESIUM: CPT | Performed by: TRANSPLANT SURGERY

## 2017-08-07 PROCEDURE — 25000132 ZZH RX MED GY IP 250 OP 250 PS 637: Performed by: PHYSICIAN ASSISTANT

## 2017-08-07 PROCEDURE — 25000131 ZZH RX MED GY IP 250 OP 636 PS 637: Performed by: STUDENT IN AN ORGANIZED HEALTH CARE EDUCATION/TRAINING PROGRAM

## 2017-08-07 PROCEDURE — 25000132 ZZH RX MED GY IP 250 OP 250 PS 637: Performed by: TRANSPLANT SURGERY

## 2017-08-07 PROCEDURE — 25000128 H RX IP 250 OP 636

## 2017-08-07 PROCEDURE — 85025 COMPLETE CBC W/AUTO DIFF WBC: CPT | Performed by: TRANSPLANT SURGERY

## 2017-08-07 PROCEDURE — 80197 ASSAY OF TACROLIMUS: CPT | Performed by: PHYSICIAN ASSISTANT

## 2017-08-07 PROCEDURE — 84100 ASSAY OF PHOSPHORUS: CPT | Performed by: TRANSPLANT SURGERY

## 2017-08-07 PROCEDURE — 80048 BASIC METABOLIC PNL TOTAL CA: CPT | Performed by: TRANSPLANT SURGERY

## 2017-08-07 PROCEDURE — 25000128 H RX IP 250 OP 636: Performed by: PHYSICIAN ASSISTANT

## 2017-08-07 PROCEDURE — 86140 C-REACTIVE PROTEIN: CPT | Performed by: TRANSPLANT SURGERY

## 2017-08-07 PROCEDURE — 93005 ELECTROCARDIOGRAM TRACING: CPT

## 2017-08-07 PROCEDURE — 93010 ELECTROCARDIOGRAM REPORT: CPT | Mod: 76 | Performed by: INTERNAL MEDICINE

## 2017-08-07 PROCEDURE — 12000006 ZZH R&B IMCU INTERMEDIATE UMMC

## 2017-08-07 PROCEDURE — 00000146 ZZHCL STATISTIC GLUCOSE BY METER IP

## 2017-08-07 PROCEDURE — 92526 ORAL FUNCTION THERAPY: CPT | Mod: GN

## 2017-08-07 PROCEDURE — 83605 ASSAY OF LACTIC ACID: CPT | Performed by: TRANSPLANT SURGERY

## 2017-08-07 PROCEDURE — 36592 COLLECT BLOOD FROM PICC: CPT | Performed by: STUDENT IN AN ORGANIZED HEALTH CARE EDUCATION/TRAINING PROGRAM

## 2017-08-07 PROCEDURE — 84484 ASSAY OF TROPONIN QUANT: CPT | Performed by: STUDENT IN AN ORGANIZED HEALTH CARE EDUCATION/TRAINING PROGRAM

## 2017-08-07 PROCEDURE — 27210913 ZZH NUTRITION PRODUCT INTERMEDIATE PACKET

## 2017-08-07 PROCEDURE — 36592 COLLECT BLOOD FROM PICC: CPT | Performed by: TRANSPLANT SURGERY

## 2017-08-07 PROCEDURE — 25000132 ZZH RX MED GY IP 250 OP 250 PS 637: Performed by: NURSE PRACTITIONER

## 2017-08-07 PROCEDURE — 25000132 ZZH RX MED GY IP 250 OP 250 PS 637: Performed by: SURGERY

## 2017-08-07 RX ORDER — NITROGLYCERIN 0.4 MG/1
TABLET SUBLINGUAL
Status: DISCONTINUED
Start: 2017-08-07 | End: 2017-08-07 | Stop reason: HOSPADM

## 2017-08-07 RX ORDER — FLUCONAZOLE 100 MG/1
100 TABLET ORAL DAILY
Status: DISCONTINUED | OUTPATIENT
Start: 2017-08-07 | End: 2017-08-08

## 2017-08-07 RX ORDER — DEXTROSE MONOHYDRATE 50 MG/ML
INJECTION, SOLUTION INTRAVENOUS
Status: COMPLETED
Start: 2017-08-07 | End: 2017-08-07

## 2017-08-07 RX ORDER — DIPHENOXYLATE HCL/ATROPINE 2.5-.025/5
5 LIQUID (ML) ORAL 2 TIMES DAILY
Status: DISCONTINUED | OUTPATIENT
Start: 2017-08-07 | End: 2017-08-14

## 2017-08-07 RX ORDER — NITROGLYCERIN 0.4 MG/1
0.4 TABLET SUBLINGUAL EVERY 5 MIN PRN
Status: DISCONTINUED | OUTPATIENT
Start: 2017-08-07 | End: 2017-08-08

## 2017-08-07 RX ORDER — ONDANSETRON 2 MG/ML
4 INJECTION INTRAMUSCULAR; INTRAVENOUS EVERY 6 HOURS PRN
Status: DISCONTINUED | OUTPATIENT
Start: 2017-08-07 | End: 2017-09-08 | Stop reason: HOSPADM

## 2017-08-07 RX ORDER — ASPIRIN 325 MG
325 TABLET ORAL ONCE
Status: COMPLETED | OUTPATIENT
Start: 2017-08-07 | End: 2017-08-07

## 2017-08-07 RX ORDER — CHLORPROMAZINE HYDROCHLORIDE 25 MG/1
25 TABLET, FILM COATED ORAL
Status: COMPLETED | OUTPATIENT
Start: 2017-08-07 | End: 2017-08-07

## 2017-08-07 RX ADMIN — HEPARIN SODIUM 5000 UNITS: 5000 INJECTION, SOLUTION INTRAVENOUS; SUBCUTANEOUS at 16:12

## 2017-08-07 RX ADMIN — FLUCONAZOLE 100 MG: 100 TABLET ORAL at 14:47

## 2017-08-07 RX ADMIN — Medication 1 PACKET: at 14:48

## 2017-08-07 RX ADMIN — SULFAMETHOXAZOLE AND TRIMETHOPRIM 1 TABLET: 400; 80 TABLET ORAL at 08:45

## 2017-08-07 RX ADMIN — GANCICLOVIR SODIUM 650 MG: 500 INJECTION, POWDER, LYOPHILIZED, FOR SOLUTION INTRAVENOUS at 08:34

## 2017-08-07 RX ADMIN — LOPERAMIDE HYDROCHLORIDE 4 MG: 2 SOLUTION ORAL at 08:47

## 2017-08-07 RX ADMIN — FLUDROCORTISONE ACETATE 0.1 MG: 0.1 TABLET ORAL at 08:45

## 2017-08-07 RX ADMIN — LOPERAMIDE HYDROCHLORIDE 4 MG: 2 SOLUTION ORAL at 19:54

## 2017-08-07 RX ADMIN — HEPARIN SODIUM 5000 UNITS: 5000 INJECTION, SOLUTION INTRAVENOUS; SUBCUTANEOUS at 00:40

## 2017-08-07 RX ADMIN — Medication 80 MG: at 08:46

## 2017-08-07 RX ADMIN — CHLORPROMAZINE HYDROCHLORIDE 25 MG: 25 TABLET, SUGAR COATED ORAL at 16:31

## 2017-08-07 RX ADMIN — LOPERAMIDE HYDROCHLORIDE 4 MG: 2 SOLUTION ORAL at 11:39

## 2017-08-07 RX ADMIN — OXYCODONE HYDROCHLORIDE 5 MG: 5 SOLUTION ORAL at 16:31

## 2017-08-07 RX ADMIN — TACROLIMUS 6 MG: 5 CAPSULE ORAL at 19:53

## 2017-08-07 RX ADMIN — MULTIVIT AND MINERALS-FERROUS GLUCONATE 9 MG IRON/15 ML ORAL LIQUID 15 ML: at 08:47

## 2017-08-07 RX ADMIN — Medication 1 PACKET: at 08:49

## 2017-08-07 RX ADMIN — TACROLIMUS 8 MG: 5 CAPSULE ORAL at 08:47

## 2017-08-07 RX ADMIN — OXYCODONE HYDROCHLORIDE 10 MG: 5 SOLUTION ORAL at 22:19

## 2017-08-07 RX ADMIN — LOPERAMIDE HYDROCHLORIDE 4 MG: 2 SOLUTION ORAL at 16:12

## 2017-08-07 RX ADMIN — ONDANSETRON 4 MG: 2 INJECTION INTRAMUSCULAR; INTRAVENOUS at 16:31

## 2017-08-07 RX ADMIN — PANTOPRAZOLE SODIUM 40 MG: 40 TABLET, DELAYED RELEASE ORAL at 08:46

## 2017-08-07 RX ADMIN — Medication 5 ML: at 19:54

## 2017-08-07 RX ADMIN — NITROGLYCERIN 0.4 MG: 0.4 TABLET SUBLINGUAL at 21:31

## 2017-08-07 RX ADMIN — GANCICLOVIR SODIUM 650 MG: 500 INJECTION, POWDER, LYOPHILIZED, FOR SOLUTION INTRAVENOUS at 19:54

## 2017-08-07 RX ADMIN — Medication 1 PACKET: at 19:53

## 2017-08-07 RX ADMIN — DEXTROSE MONOHYDRATE 1000 ML: 50 INJECTION, SOLUTION INTRAVENOUS at 08:44

## 2017-08-07 RX ADMIN — Medication 1 PACKET: at 08:48

## 2017-08-07 RX ADMIN — HEPARIN SODIUM 5000 UNITS: 5000 INJECTION, SOLUTION INTRAVENOUS; SUBCUTANEOUS at 08:47

## 2017-08-07 RX ADMIN — NITROGLYCERIN 0.4 MG: 0.4 TABLET SUBLINGUAL at 21:55

## 2017-08-07 RX ADMIN — ASPIRIN 325 MG ORAL TABLET 325 MG: 325 PILL ORAL at 21:21

## 2017-08-07 RX ADMIN — OXYCODONE HYDROCHLORIDE 10 MG: 5 SOLUTION ORAL at 03:56

## 2017-08-07 NOTE — PROGRESS NOTES
[facility]  Transplant Infectious Diseases Progress Note      Camacho Bhagat MRN# [id number]   YOB: 1965 Age: 51 year old   Date of Service: 8/07/2017        Assessment and recommendations:   Recommendations:  - Continue the present IV ganciclovir dosing after hemodialysis runs.  - Continue TMP-SMX prophylaxis.  - Would not add additional new antimicrobials unless he becomes overtly septic or deteriorates markedly.  - Would not presently treat the GPC isolated in the 8/5/17 ascites fluid broth culture (since he is afebrile and seems to be clinically slowly improving) -- await the ID and susceptibilities.  - Continue to monitor blood CMV PCR assays weekly (next due 8/10/17).    Case discussed with the Transplant Surgery team.  Transplant ID will follow with you.    Edwin Del Toro MD  Pager [phone]    Assessment:  A 51 year old gentleman immunosuppressed (tacrolimus; MMF on hold; received basiliximab initial induction) s/p liver transplant on 3/4/17 for CASTAÑEDA who was admitted 7/4/17 with colitis and underwent 7/26/17 exploratory laparotomy.  The initial diagnosis was neutropenic colitis, but new primary seroconversion CMV viremia was discovered on 7/10/17.  He had persistent fevers from 7/10 - 15/17 and again from 7/21 - 8/4/17.    ID issues:    - Now resolved, prior intermittent cryptogenic fevers:  It was not clear whether the persisting fevers were secondary to CMV viremia or a superimposed abdominal infection, but he has now thankfully been consistently afebrile since 8/4/17 PM.  The fever decline seemed to perhaps best correspond to his decreasing blood CMV viral load.  He has had a prolonged hospital course with recurrent fever and encephalopathy despite broad spectrum antibiotics.  Blood cultures have remained negative and only colonizer elvie was found in BALs.  He underwent exploratory laparotomy with right angelique-colectomy / end ileostomy / mucous fistula /  "partial omentectomy on 7/26/17 where no perforation was seen but right colitis was observed with a probable intra-adominal abscess.  He had residual right-sided abdominal findings (seen on 7/25/17 abdominal CT scan).  He completed ten days of broad spectrum antimicrobial therapy (meropenem / daptomycin / micafungin through 8/3/17.  Repeat abdominal CT scan on 8/4/17 showed stability / improvement in the abdominal findings, although increased ascites, but little in the way of other likely infectious foci.  8/5/17 paracentesis is mostly benign (with some GPCs growing in broth only).  He is now afebrile with improved cognitive status, a normal peripheral WBC, and a stable (albeit still quite elevated post-op) serum CRP level.    - CMV viremia /  CMV colitis:  He presented with a \"detected\" low level CMV viremia (detected < 137 IU / ml) upon admission and developed a progressively worsening viremia after stopping his Valcyte prophylaxis.  Valcyte was re-started on 7/15/17 when the blood CMV PCR viral load was 4,225 IU / ml (3.6 log) and that was switched to ganciclovir on 7/20/17 with a viral load of 1,906 IU /ml (3.3 log). After one week of GCV treatment, the blood CMV PCR dropped to 290 IU / ml (2.5 log) on 7/27/17 and decreased further to < 137 IU / ml (< 2.1 log) on 8/3/17 (a > 1 log drop in the viremia over two weeks).  The CMV immunostain of colonic tissue biopsy was positive; confirming a CMV etiology of his colitis.  Because of an initial slow pace of viremia decline, he had been treated with supra-high dose ganciclovir (doubled to 10 mg / kg / dose BID) for the possibility of ganciclovir resistance, but with the improving viral load on 8/3/17, his ganciclovir dose was reduced to 7.5 mg / kg IV BID on 8/4/17 PM -- we will watch weekly blood CMV PCR viral loads (next due to be checked 8/10/17) to make certain they remain suppressed at this lower dose.  (A CMV drug resistance genotype assay ordered on 7/27/17 went " samara, so we have no genotypic data to help with decisions.)     - Improving encephalopathy:  His initial delirium and somnolence were likely toxic-metabolic encephalopathy precipitated by acute sepsis / medications side effects superimposed on chronic hepatic encephalopathy.  With defervescence and overall improvement, his mental status seems to be spontaneously clearing.  A 7/20/17 brain MRI scan was unremarkable and a repeat brain MRI has not yet been deemed needed.  An 8/5/17 serum cryptococcal antigen assay was negative.  The likelihood of any CNS infection at this time is exceeding low.    - Improved EBV viremia:  Initially discovered to have EBV viremia at 406,697  / ml on 7/18/17.  With decreased immunosuppression, that had fallen to 6,864  / ml by 8/1/17.  Checking blood EBV PCRs ~ weekly so far.  Random colonic biopsies from the 7/21/17 colonoscopy and 7/26/17 hemicolectomy histopathology results were not suggestive for PTLD.    - Multifactorial pancytopenia, resolved neutropenia:  Was likely due to medication and CMV viremia.  Neutropenia has resolved.    - S/p liver transplant:  Continue on tacrolimus; but MMF was held 6/27/17 in the context of sepsis and pancytopenia.    Old ID issues:  - Resolved EJ:  Was due to severe sepsis.  Creatinine is now down to < 1.   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  Nevertheless, he received a full ten day (7/25 - 8/3/17) daptomycin course for severe sepsis in the setting of colonization with VRE.   -  Staphylococcus capitis in ascitic fluid 7/5/17:  Was culture contamination.   - Rhinovirus in BAL 7/15/2017:  Likely to be due to chronic shedding, since his main presentation was abdominal, not respiratory.    Other ID issues:  - PCP prophylaxis: TMP-SMX was held in 6/17 due to neutropenia, but resumed 8/4/17.  -  Serostatus:  CMV D+/R-, EBV D+/R-, HSV1?/2?, VZV ?.  Presently on IV ganciclovir.  - Immunization status: up-to-date.  - Gamma globulin status: >1660 as of 7/24/2017  - Isolation: Routine.    Interval History:  Mr. Bhagat continued to have low-grade fever through 8/4/17 (to 100.5 degrees that AM), but has now been consistently afebrile (T max 99.5 degrees) since 8/4/17 PM on only ongoing IV ganciclovir (since 7/20/17) plus TMP-SMX prophylaxis since 8/4/17 (when the ten day 7/25 - 8/3/17 empiric course of meropenem plus micafungin was finished).  PO fluconazole 100 mg daily was added today.  His peripheral WBC remains normal in the 5 - 6K range over the past week and his serial serum CRPs are stable at 130 - 140.0.  He continues to complain of diffuse abdominal tenderness but now has no abdominal pain when undisturbed.  At repeat abdominal CT scan on 8/4/17 showed improvement in the right retroperitoneal lesion and no other likely infectious foci, but increased amounts of ascites, so a diagnostic paracentesis was performed on 8/5/17 by Interventional Radiology.  Culture and fluid analysis results are so far benign, except for a GPC that grew in the aerobic broth only (not yet identified) at 21 hours of incubation -- that may be a contaminant.  His somnolence seems noticeably improved today and he is more verbally interactive, although he still has significant memory constraints (cannot remember his dogs' names) and still has somewhat tangential and confused conversation.  Beyond his abdomen, he otherwise lacks discomfort, including no dyspnea, cough, chest pain, sore throat, other EENT symptoms, new arthralgias / myalgias, nausea, diarrhea, dysuria, headache, or other new symptoms today.  His blood CMV PCR viral load dropped to < 137 IU / ml on 8/3/17 with the IV ganciclovir.    [facility] Infectious Disease Illness (copied from the 8/4/17 Transplant ID Note):   A 51 year old gentleman with PMH of DM, HTN,  fibromyalgia, previous DVT with IVC filter,  s/p liver transplant secondary to ESLD due to CASTAÑEDA on 03/04/2017, CMV D+/R-, EBV D+/R-, who was admitted on 07/04 due to neutropenic enterocolitis starting empiric therapy with zosyn + flagyl + vancomycin + micafungin being transferred to the ICU, requiring exploratory laparotomy + wash out for neutropenic enterocolitis on 07/04, reporting inflamed cecum and ascending colon, having a second look on 07/05 finding improvement of the inflammation and performing closure of abdominal incision; culture resulted S. Capitis considered a contaminant. Valcyte was stopped since admission. On 07/06, flagyl + vancomycin + micafungin were stopped and zosyn changed to ertapenem. As per ID note from 07/06, recommended to switch back ertapenem to zosyn since thrombocytopenia was seen before zosyn treatment, pancytopenia possibly related to medication (MMF, bactrim, valcyte, initially seen at the beginning of June) and recommended to monitor CMV PCR weekly ( on 07/03: positive < 137; before on 06/26 was ND). Additionally, on 07/12 a BAL was repeated and showed VRE/CONS/P putida/C. Albicans, all were considered a colonizers, and primary team continued with ertapenem, held MMF, bactrim, valcyte). On 07/13, it was recommended to re-start valcyte since his CMV PCR was 145 and counts improved. Patient persisted febrile, with evident ascitis tapped but culture resulted negative. Course complicated with thrombosis of the right arterial artery with ischemia to the tip of the ischemic finger. On 07/15, BAL was repeated and was positive for rhinovirus. Valcyte was re-initiated on 07/15 due to CMV PCR: 4225. PAtient was discharged from the ICU on 07/17. Also, 14 days of ertapenem were completed on 07/18 and EBV PCR was taken on 07/18 and resulted [id number] concerning of PTLD since patient persisted febrile with ascitis and colitis. On 07/20, ZAKIA was switched to GCV due to worsening level of 1906.  Patient became encephalopathic but MRI resulted negative. On 07/21 a colonoscopy was performed and showed normal colonic mucosa; random biopsies were taken. On 07/25, patient deteriorated clinically presenting respiratory failure requiring intubation, so was transferred to ICU and started empiric treatment with meropenem + daptomycin + micafungin, and GCV dose was increased to 10 mg BID. A CT abdomen was repeated and showed retroperitoneal air so underwent EL + lysis of adhesions + right hemicolectomy + ileostomy + partial omentectomy  due to ascending colitis (no neida perforation or ischemia). On 07/27 CMV PCR resulted 290. Patient persists with intermittent fever and encephalopathy.    Transplants:  3/4/2017 (Liver)    Review of Systems:  CONSTITUTIONAL:  No fever, chills, or sweats since 8/4/17 PM.  Chronic fatigue and anorexia.  EYES: No eye pain, visual changes, or scleral icterus.  ENT:  No rhinorrhea, sinus pain, otalgia, hearing loss, tinnitus, sore throat, or oral pain.  LYMPH:  No edema.  RESPIRATORY:  No cough, increased sputum, or dyspnea.  CARDIOVASCULAR:  No chest pain, palpitations.  GASTROINTESTINAL:  Improved abdominal pain.  Significant ascites.  S/p liver transplant.  No nausea, vomiting, diarrhea or constipation.  GENITOURINARY:  Improved EJ.  No dysuria.  HEME:  Chronic anemia.  Improved neutropenia.  No easy bruising.  ENDOCRINE:  Type 2 diabetes mellitus.  MUSCULOSKELETAL:  Necrotic toe tips.  Generally deconditioned.  No new focal myalgias / arthralgias.  SKIN:  No rash or pruritus.  NEURO:  Confused, weak memory.  Wakefulness improved.  No headache.  PSYCH:  Negative.    Past Medical / Surgical History:  Past Medical History:   Diagnosis Date     Cancer (H)      Depressive disorder, not elsewhere classified      Esophageal reflux      Fibromyalgia 1/2009    dx with Dr Benitez( Rheum)     History of thrombophlebitis      Osteoarthritis      Other acute embolism veins 11/01    Deep vein  thrombophlebitis, filter placed     Other and unspecified hyperlipidemia      Other chronic nonalcoholic liver disease     Fatty liver      Other testicular hypofunction      Pneumonia, organism 10-01    Included ARDS, sepsis, and  acute renal failure; hospitalized     Rheumatoid arthritis(714.0)      Type II or unspecified type diabetes mellitus without mention of complication, not stated as uncontrolled 11/01    Managed by endocrinology     Unspecified essential hypertension 11/01    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     Unspecified sleep apnea     Uses BiPAP     Past Surgical History:   Procedure Laterality Date     BENCH LIVER N/A 3/4/2017    Procedure: BENCH LIVER;  Surgeon: Jovan Tran MD;  Location: [facility] OR     C NONSPECIFIC PROCEDURE      tracheostomy     C NONSPECIFIC PROCEDURE      repair of deviated septum     C NONSPECIFIC PROCEDURE  2007    Rt knee arthroscopy     C TOTAL KNEE ARTHROPLASTY  2008    Right knee arthroscopy     CHOLECYSTECTOMY       COLONOSCOPY N/A 7/21/2017    Procedure: COMBINED COLONOSCOPY, SINGLE OR MULTIPLE BIOPSY/POLYPECTOMY BY BIOPSY;  Colonoscopy;  Surgeon: Izaiah Montes MD;  Location: [facility] GI     ESOPHAGOSCOPY, GASTROSCOPY, DUODENOSCOPY (EGD), COMBINED N/A 8/4/2016    Procedure: COMBINED ESOPHAGOSCOPY, GASTROSCOPY, DUODENOSCOPY (EGD), BIOPSY SINGLE OR MULTIPLE;  Surgeon: Trent Pederson MD;  Location:  GI     LAPAROTOMY EXPLORATORY N/A 7/4/2017    Procedure: LAPAROTOMY EXPLORATORY;  Exploratory Laparotomy, washout;  Surgeon: Tip Zhang MD;  Location: [facility] OR     LAPAROTOMY EXPLORATORY N/A 7/5/2017    Procedure: LAPAROTOMY EXPLORATORY;  Exploratory Laparotomy, Washout with closure.;  Surgeon: Tip Zhang MD;  Location: [facility] OR     LAPAROTOMY EXPLORATORY N/A 7/26/2017    Procedure: LAPAROTOMY EXPLORATORY;  Exploratory Laparotomy, Right angelique-colectomy, end ileostomy, mucosal fistula, partial omentectomy;  Surgeon: Sara Dinh  MD;  Location: [facility] OR     TRANSPLANT LIVER RECIPIENT  DONOR N/A 3/4/2017    Procedure: TRANSPLANT LIVER RECIPIENT  DONOR;  Surgeon: Jovan Tran MD;  Location: [facility] OR     Current Scheduled Medications:    diphenoxylate-atropine  5 mL Oral BID     tacrolimus  6 mg Oral BID IS     fluconazole  100 mg Oral Daily     insulin glargine  24 Units Subcutaneous Q24H     fludrocortisone  0.1 mg Oral Daily     insulin isophane human  17 Units Subcutaneous QAM     sodium chloride (PF)  3 mL Intracatheter Q8H     ganciclovir (CYTOVENE) intermittent infusion  7.5 mg/kg Intravenous Q12H     sulfamethoxazole-trimethoprim  1 tablet Oral Daily     loperamide  4 mg Oral or Feeding Tube 4x Daily     insulin aspart  1-12 Units Subcutaneous Q4H     fiber modular  1 packet Per Feeding Tube TID     insulin aspart   Subcutaneous TID w/meals     protein modular  1 packet Per Feeding Tube TID     aspirin  80 mg Oral Daily     multivitamins with minerals  15 mL Per Feeding Tube Daily     heparin  5,000 Units Subcutaneous Q8H     pantoprazole  40 mg Oral or Feeding Tube Daily     sodium chloride (PF)  3 mL Intracatheter Q8H     Physical Examination:  Vital sign ranges over the past 24 hours:  Temp:  [97.4  F (36.3  C)-99.2  F (37.3  C)] 98.7  F (37.1  C)  Pulse:  [84-90] 90  Heart Rate:  [83-92] 83  Resp:  [16-18] 18  BP: (142-159)/(67-85) 142/78  SpO2:  [93 %-97 %] 95 %    Intake/Output Summary (Last 24 hours) at 17 1438  Last data filed at 17 1100   Gross per 24 hour   Intake             1815 ml   Output             2075 ml   Net             -260 ml     Physical Examination:  GENERAL:  Spontaneously awake, fairly talkative, still somewhat confused, disheveled, fatigued-appearing, WDWN 51 year old man in NAD semi-recumbent in bed.  EYES:  EOMI, PERRL, anicteric sclerae.  ENT:  No otorrhea.  NJ tube present.  No nasal cannula present.  No anterior oral lesion.  NECK:  Supple.  Right IJ CVC line site lacks  inflammation.  LYMPH:  No cervical lymphadenopathy.  LUNGS:  Few bilaterally scattered coarse rhonchi.  CARDIOVASCULAR:  Regular rate and rhythm, tachycardia resolved, normal S1, S2, without murmur, gallop, or rub.  ABDOMEN:  Normal bowel sounds, soft, slight generalized tenderness, mildly distended, midline stapled wound lacks inflammation with dressed superior mucous fistula, RLQ ileostomy present.  :  No Crowe.  EXTREMITIES:  Distally warm, no edema,  ischemic right hallux and right second toe, also ischemic tip of left second toe, and right index, no ulcers.  Line site lacks inflammation.  NEUROLOGIC:  More awake, less confused, basically oriented x 3 but weak memory and some confabulation, moves extremities x 4.    Inflammatory Markers    Recent Labs   Lab Test  08/07/17   0549  08/06/17   0633  08/05/17   0616  07/05/17   1810  03/05/17   0358  11/23/16   0813  11/16/16   0706  11/15/16   1039   08/15/11   1151  04/11/11   1209  01/03/11   1246  02/15/10   1232   SED   --    --    --    --    --   45*   --    --    --   11  14  17*  25*   CRP  130.0*  130.0*  140.0*  354.0  20.0*  58.0*  59.1*  49.8*   < >  11.1*  9.0*  11.7*  17.5*    < > = values in this interval not displayed.     Immune Globulin Studies     Recent Labs   Lab Test  07/24/17   0705  08/15/11   1243   IGG  1660*  1160   IGG1   --   734   IGG2   --   294   IGG3   --   7*   IGG4   --   59     Metabolic Studies       Recent Labs   Lab Test  08/07/17   0549  08/06/17   1707  08/06/17   0633  08/05/17   0616  08/04/17   1721  08/04/17   0548   08/01/17   0651   07/31/17   1229   07/30/17   0826   07/25/17   0945   07/06/17   0316   NA  139  140  141  139  141  143   < >  150*   < >   --    < >   --    < >  137   < >  143   POTASSIUM  5.4*  5.4*  5.4*  5.2  5.1  5.0   < >  4.3   < >   --    < >   --    < >  4.0   < >  5.0   CHLORIDE  114*  115*  114*  115*  113*  117*   < >  123*   < >   --    < >   --    < >  104   < >  116*   CO2  18*  18*  19*   18*  19*  20   < >  20   < >   --    < >   --    < >  26   < >  18*   ANIONGAP  8  6  8  5  8  6   < >  7   < >   --    < >   --    < >  7   < >  9   BUN  51*  49*  47*  47*  50*  53*   < >  51*   < >   --    < >   --    < >  77*   < >  62*   CR  1.06  1.01  0.95  0.90  0.88  0.91   < >  0.90   < >   --    < >   --    < >  2.60*   < >  2.75*   GFRESTIMATED  73  77  83  88  >90  Non  GFR Calc    87   < >  89   < >   --    < >   --    < >  26*   < >  24*   GLC  99  108*  98  92  102*  89   < >  141*   < >   --    < >   --    < >  382*   < >  139*   A1C   --    --    --    --    --    --    --    --    --    --    --    --    --    --    --   Canceled, Test credited   Below Assay Range  NOTIFIED LEONARD ONEILL AT 0538 ON 7/6/17 BY EAANTOLIN     JACOB  8.2*  7.9*  8.1*  8.0*  8.0*  7.9*   < >  8.1*   < >   --    < >   --    < >  7.6*   < >  6.9*   PHOS  4.9*   --   4.7*  4.1   --   3.7   < >  3.1   < >   --    < >   --    < >   --    < >  5.5*   MAG  2.1   --   2.2  2.3   --   2.0   < >  1.9   < >   --    < >   --    < >   --    < >  2.1   LACT   --    --    --    --    --    --    --    --    --   1.0   --   0.6*   < >   --    < >  1.5   CKT   --    --    --    --    --    --    --   16*   --    --    --    --    --   40   --    --     < > = values in this interval not displayed.     Hepatic Studies    Recent Labs   Lab Test  08/02/17   0543  07/27/17   0410  07/25/17   1651  07/25/17   0945  07/25/17   0017  07/24/17   0705  07/23/17   0625   07/11/17   0328   07/05/17   1810   BILITOTAL  0.4  1.3  0.6  0.5   --   0.4  0.3   < >  0.5   < >   --    ALKPHOS  199*  143  242*  317*   --   264*  224*   < >  158*   < >   --    ALBUMIN  2.3*  2.4*  2.0*  1.9*   --   1.7*  1.9*   < >  1.8*   < >   --    AST  62*  27  61*  90*   --   86*  63*   < >  34   < >   --    ALT  42  27  50  60   --   54  47   < >  27   < >   --    LDH   --    --    --    --   258*   --    --    --    --    --   289*   GGT   --    --    --     --    --    --    --    --   58   --    --     < > = values in this interval not displayed.     Pancreatitis testing    Recent Labs   Lab Test  07/24/17   0705  07/17/17   0630  07/12/17   0342  07/10/17   0340  07/05/17   0346  03/05/17   0358  03/03/17   1458  02/21/17   1520  12/09/16   1159  02/08/16   1144   09/30/10   1734   AMYLASE   --    --    --    --    --   53  70   --    --    --    --   82   LIPASE   --    --    --    --    --   216   --   326  161   --    --   138   TRIG  303*  168*  114  177*  174*   --    --    --    --   99   < >   --     < > = values in this interval not displayed.     Hematology Studies      Recent Labs   Lab Test  08/07/17   0549  08/06/17   1207  08/06/17   0633  08/06/17   0111  08/05/17   0616  08/04/17   0548  08/03/17   0533  08/02/17   0543   WBC  5.5   --   5.2   --   6.3  6.9  5.1  5.0   ANEU  3.7   --   3.5   --   3.9  4.1  2.8  2.7   ALYM  1.5   --   1.5   --   2.2  2.5  1.9  2.0   ZINA  0.2   --   0.2   --   0.2  0.3  0.3  0.2   AEOS  0.1   --   0.0   --   0.0  0.0  0.0  0.1   HGB  7.7*  8.3*  7.6*  7.9*  6.9*  7.8*  7.3*  7.6*   HCT  23.7*   --   23.2*   --   20.9*  24.3*  23.5*  24.4*   PLT  116*   --   94*   --   94*  99*  92*  90*     Arterial Blood Gas Testing    Recent Labs   Lab Test  08/02/17   1216  07/26/17   2243  07/26/17   2133  07/26/17   2046  07/26/17 2017 07/25/17   1746  07/25/17   1037   PH  7.37  Incorrect specimen type  NOTTIED BY WOJCIECH DEWITT, NEW ORDER TO REPLACE.7/26/17 AT 2346 BY ZAINAB.  CORRECTED ON 07/26 AT 2350: PREVIOUSLY REPORTED AS 7.27     --    --   Canceled, Test credited   Incorrect specimen type    7.32*  7.14*   PCO2  31*  Incorrect specimen type  NOTTIED BY WOJCIECH DEWITT, NEW ORDER TO REPLACE.7/26/17 AT 2346 BY ZAINAB.  CORRECTED ON 07/26 AT 2350: PREVIOUSLY REPORTED AS 45     --    --   Canceled, Test credited   Incorrect specimen type    42  69*   PO2  69*  Incorrect specimen type  NOTTIED BY WOJCIECH DEWITT, NEW ORDER TO  REPLACE.7/26/17 AT 2346 BY ZAINAB.  CORRECTED ON 07/26 AT 2350: PREVIOUSLY REPORTED AS 53     --    --   Canceled, Test credited   Incorrect specimen type    81  103   HCO3  18*  Incorrect specimen type  NOTTIED BY WOJCIECH DEWITT, NEW ORDER TO REPLACE.7/26/17 AT 2346 BY ZAINAB.  CORRECTED ON 07/26 AT 2350: PREVIOUSLY REPORTED AS 20     --    --   Canceled, Test credited   Incorrect specimen type    22  23   O2PER  21  Incorrect specimen type  NOTTIED BY WOJCIECH DEWITT, NEW ORDER TO REPLACE.7/26/17 AT 2346 BY ZAINAB.  CORRECTED ON 07/26 AT 2350: PREVIOUSLY REPORTED AS 40    40  62  100  100.0  100  40.0  7L     Urine Studies     Recent Labs   Lab Test  08/05/17   0617  07/28/17   1042  07/25/17   1205  07/19/17   1150  07/11/17   1105   URINEPH  5.0  5.0  5.0  5.0  5.0   NITRITE  Negative  Negative  Negative  Negative  Negative   LEUKEST  Negative  Negative  Trace*  Negative  Negative   WBCU  5*  6*  5*  2  <1     Vancomycin Levels     Recent Labs   Lab Test  07/06/17   0316  10/28/16   1405   VANCOMYCIN  22.1  14.4       Component Value Flag Ref Range Units Status Collected Lab      Procalcitonin 0.78   ng/ml Final 08/02/2017  5:28 PM 51     Microbiology:  Ascites  8/5 cx GPC in broth only at 21 hours incubation -- ID pending.  Anaerobic / fungal cxs NGTD.  Gram stain:  NOS, few WBCs,    moderate RBCs.  Fluid analysis:  Yellow, slightly cloudy,  (91%N, 6% L, 3% M), albumin 1.6, protein 3.8.    7/26 (OR) negative.   Gram stain:  NOS, few WBCs.    7/25 negative.  Gram stain:  NOS, no WBCs.    7/5 S capitis    BAL   7/15 yeast and Rhinovirus     7/12 single colony of VRE, C albicans/dubliniensis   Sputum  7/29 Normal elvie, heavy C albicans    7/11 VRE and Coag Neg Staph     Bcxs:    8/4 x 2, 8/2 x 2, 7/31 x 2, 7/28 x 2, 7/25 x 2, 7/15 NGTD / negative    Ur cxs  8/5, 7/25 negative    8/5 Serum CRAG negative    CMV viral loads    Recent Labs   Lab Test  08/03/17   0533  07/27/17   1109  07/20/17   1427  07/18/17   0530   07/15/17   1553   CSPEC  EDTA PLASMA  EDTA PLASMA  CORRECTED ON 07/28 AT 0840: PREVIOUSLY REPORTED AS Blood    Plasma  Plasma  Bronchoalveolar Lavage   CMVLOG  <2.1  2.5*  3.3*  3.0*  3.6*     CMV viral loads    Log IU/mL of CMVQNT   Date Value Ref Range Status   08/03/2017 <2.1 <2.1 [Log_IU]/mL Final   07/27/2017 2.5 (H) <2.1 [Log_IU]/mL Final   07/20/2017 3.3 (H) <2.1 [Log_IU]/mL Final   07/18/2017 3.0 (H) <2.1 [Log_IU]/mL Final   07/15/2017 3.6 (H) <2.1 [Log_IU]/mL Final   07/10/2017 2.2 (H) <2.1 [Log_IU]/mL Final   07/03/2017 <2.1 <2.1 [Log_IU]/mL Final   06/26/2017 Not Calculated <2.1 [Log_IU]/mL Final     CMV resistance testing    EBV DNA Copies/mL   Date Value Ref Range Status   08/01/2017 6864 (A) EBVNEG [Copies]/mL Final   07/18/2017 021842 (A) EBVNEG [Copies]/mL Final     Pathology:   Colectomy path 7/26/17  - Colonic wall with perforation, edema, and acute serositis   - Background colonic mucosa with no significant histologic abnormality   - CMV immunostain is positive in rare (3) cells   - Terminal ileum with no significant histologic abnormality   - Viable, unremarkable surgical margins   - One benign lymph node (0/1)   - Appendix with fibrous obliteration of the tip     Colon biopsies 7/21/2017   A: Colon biopsy, ascending   B: Colon biopsy, descending   FINAL DIAGNOSIS:   A. COLON BIOPSY, ASCENDING:   - Colonic mucosa with no significant histologic abnormality   - Negative for intraepithelial lymphocytosis or deposition of   subepithelial thick collagenous band   B. COLON BIOPSY, DESCENDING:   - Crypt architecture distortion, consistent with healed prior injury   - Negative for active inflammation or dysplasia   - Negative for intraepithelial lymphocytosis or deposition of   subepithelial thick collagenous band     Cytology of ascitic fluid 7/25/2017   PERITONEAL FLUID, ASCITES:   -Negative for malignancy   Cytology of ascitic fluid 7/14/2017.   Peritoneal fluid, ascites:   - Negative for malignancy    - Acute and chronic inflammation present   Specimen Adequacy: Satisfactory for evaluation.   Ascitic fluid leukemia/lymphoma 2017.   INTERPRETATION:   Ascites fluid:        Rare to absent viable B cells     COMMENT:   This specimen was collected on 17 but was not ordered/delivered to   lab/run until 17 - results should be interpreted with caution due   to low cellularity/viability.     Due to limited cellularity we were only able to analyze the B cells.   There is no immunophenotypic evidence of B cell non-Hodgkin lymphoma.   Neoplastic cells, including large cells, may not survive specimen   processing. This sample may not be representative. Final interpretation   requires correlation with morphologic and clinical features.     Imagin/5 Ultrasound-guided paracentesis performed by IR:  250 ccyellow-brown fluid sent for analysis.   Abdm CT:  Heterogenous area of right-sided retroperitoneal gas has mildly decreased in size (versus 17 scan).  Moderate to severe ascites with diffuse peritoneal enhancement.  This may represent diffuse infection, for example bacterial peritonitis.  Mild residual foci of free air in the abdomen is likely postsurgical.  Diffuse small bowel and colonic wall thickening, similar to prior, likely reactive to ascites/peritonitis.  Findings of portal hypertension including splenomegaly, ascites, esophageal and upper abdominal varices.  Small right pleural effusion with associated atelectasis. Improved left pleural effusion and basilar consolidation.   CXR:  Decreased lung volumes with improving perihilar and bibasilar opacities, which may represent atelectasis, pulmonary edema, or infection.   CXR: Decreased lung volumes with mildly increased perihilar and bibasilar opacities, which may represent pulmonary edema, atelectasis, or infection.  Small right pleural effusion.   BLE U/S:  Right leg: Normal RONAL. Mildly Abnormal TBI, suggestive of small vessel  disease. Patent right lower extremity arteries without evidence of hemodynamically significant stenosis.  Left leg: Normal RONAL. Abnormal TBI, suggestive of small vessel disease. Patent left lower extremity arteries without evidence of hemodynamically significant stenosis.  7/31 CXR:  Improving perihilar and bibasilar opacities likely represent infection/aspiration, atelectasis, or pulmonary edema.  Moderate opacities bilaterally persist.  Stable small to moderate pleural effusions bilaterally.  7/28 CXR:  Interval removal of the endotracheal tube.  Increased perihilar opacities, worsening infection versus pulmonary edema.  Increased bilateral moderate pleural effusions with overlying atelectasis/consolidation.  7/25 Chest / abdm CT:  New large heterogeneous area of right-sided retroperitoneal gas which is highly concerning for an infectious process.  Given the history of prior neutropenic colitis and biopsies, there could also be a component of stool from a ruptured viscus, despite the lack of  surrounding bowel loops and no extraluminal oral contrast. Surgical consultation is recommended. Bibasilar atelectasis versus consolidation with small bilateral pleural effusions.  Extensive mesenteric and soft tissue edema with large volume ascites. Numerous mesenteric and retroperitoneal lymph nodes which are presumably reactive.  Postsurgical changes of liver transplant.  7/20 Brain MRI:  Significant image degradation due to motion artifact, with no definite acute intracranial pathology. No abnormal contrast enhancing intracranial lesions.  Mucosal thickening in the sphenoid and maxillary sinuses and left greater than right mastoid fluid are nonspecific in the setting of recent intubation.  7/13 Chest / abdm CT:  Improved moderate right-sided pleural effusion and resolution of left-sided pleural effusion. There remain large areas of  atelectasis/consolidation in both lungs, including in the left lower lobe despite  resolution of left-sided pleural effusion. Superimposed infectious process cannot be excluded.  Moderate-large amount of ascites increased from 7/8/2017, which makes it difficult to evaluate for the presence or absence of inflammatory change or infectious process in the abdomen or pelvis. No intra-abdominal abscess.  Stable small presumed hematoma within the ventral abdominal wall fat.  Splenomegaly.

## 2017-08-07 NOTE — PROGRESS NOTES
Social Work Services Progress Note    Hospital Day: 35  Date of Initial Social Work Evaluation: 8/1/2017  Collaborated with:  RNCC    Data:  Pt meets criteria for LTACH. Pt will not be going to TCU.    Intervention/Assessment:  Per RNCC pt meets criteria for LTACH at [facility] and they will accept pt once medically stable. RNCC will work on placement at [facility]. RNCC will inform family of new plan.    Plan:    Anticipated Disposition:  Facility:  [facility]    Barriers to d/c plan:  Medical stability    Follow Up:  RNCC to coordinate with [facility] and family about discharge plan.    Please contact  if any additional needs arise.    JOSEY Chowdhury, LGSW  7A   Ph: [phone], Pager: [phone]

## 2017-08-07 NOTE — PLAN OF CARE
Problem: Goal Outcome Summary  Goal: Goal Outcome Summary  Outcome: No Change  AVSS on room air.  and 178, treated per MAR. Pt refused humulin insulin and team notified. Endo NP came to bedside and discussed insulin regimen with pt and pt became frustrated and agitated. Liver tx NP came to bedside as well and discussed with pt. Pt later agreed to take novolog after initially refusing as well. Diet increased to DD3 from full liquidl; pt has poor appetite and on continuous tube feeds at goal of 60ml/hr into NJT. IJ dressing changed and lumens saline locked. Ileostomy with large output; device changed. Mucous fistula bag removed per WOC RN; recommendation to leave covered with 4x4 gauze. Coccyx dressing changed x 1 with WOC RN at bedside; pt encouraged to reposition. Pt repositioned at least q 2 by staff but also independently repositioning in bed. Pt up to chair for 10 minutes but complained of pain and requested to go back to bed. All care explained and pt needs reinforcement. Mother at bedside and active in care. Will continue to monitor and will notify team of changes.

## 2017-08-07 NOTE — PROGRESS NOTES
Immunosuppression Note:    Camacho Bhagat is a 51 year old male who is seen today  for immunosuppression management     I, Jovan Tran MD, I have examined the patient with the resident/PA/Fellow, discussed and agree with the note and findings.  I have reviewed today's vital signs, medications, labs and imaging. I reviewed the immunosuppression medications and levels. 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 Concern for CMV colitis with low count CMV viremia/GI PTLD and subsequently underwent colonoscopy on 7/21, random biopsies obtained.  On 7/25/2017 patient had respiratory distress, abdominal compartment syndrome and EJ with oliguria. Broad spectrum antibiotics were started.  He was intubated and had a paracentesis with 5L removed. He did not required pressors.  CT was obtained which showed a new large heterogeneous right sided retroperitoneal gas and air tracking along duodenum.  No contrast extravasation.  No intraperitoneal air noted. Colorectal surgery was consulted. Initial conservative management with bowel rest and IV abx. Pt continued to spike fevers despite IV abx.  Now s/p Exploratory laparotomy, right angelique-colectomy, end ileostomy, mucus fistula, partial omentectomy on 7/26.    Graft Function: Good function. LFTs WNL as of 8/2. US liver unremarkable.  Immunosuppression Management:   CellCept discontinued (6/27/17) due to neutropenia.   Prograf goal ~8 due to single IS. Level < 3. Change to pills and  add fluconazole booster today.  Cardiorespiratory: Stable respiratory status, NLB on RA  Hematology: Pancytopenia on admission, acute on chronic, secondary to medications (MMF, bactrim, valcyte). Improved with holding medications and neupogen. 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  Continue to hold MMF and bactrim.   -Anemia- Hemoglobin stable. WBC stable. Last blood transfusion on 7/26.   GI:   -Neutropenic colitis on admission. Colonoscopy 7/21. Biopsy: resolving injury secondary to possible drug induced vs infectious.   -Bowel perforation: CT scan abd/pelvis, po contrast, showed a new large heterogeneous right sided retroperitoneal gas and air tracking along duodenum.  No contrast extravasation.  No intraperitoneal air noted. Colorectal surgery consulted.  Continued to spike high grade temperature despite IV antibiotics therefore patient taken to OR. s/p Exploratory laparotomy, right angelique-colectomy, end ileostomy, mucus fistula, partial omentectomy on 7/26. Findings no neida perforation, phlegmon/inflammationright colon. Colon pathology suggested colon perforation, negative for malignancy; CMV stain positive.  WOCN consult for ostomy care.   -Advance to dysphagia 3 diet. TF at goal.   -Large output from the iliostomy - Output remains higher than goal. Goal ileostomy output ~ 1000 cc in 24 hrs.   Loperamide 2mg q6H. Continue fiber TID.  Increase Lomotil BID, 5 mg.  Fluid/Electrolytes/Renal: EJ on admission, likely sec to high intraabdominal pressures and ischemic ATN sec to sepsis. Now improved  Hypernatremia, resolved with free water.  Hyperkalemia, restarted florinef.  Endocrine: DM type 2. Endocrine consulting for glycemic management.  BG 90s-130s on lantus/NPH.  Infectious disease:  ID following.  -Severe sepsis: On admission, secondary to right colon phlegmon. Meropenem/daptomycin/micafungin tx x 10 days completed on 8/3. S/p right angelique-colectomy.  Path: CMV stain +,  perforation of colon noted.  Fluid cx x 2 NGTD.  -CMV viremia: Primary CMV infection.  CMV colitis per pathology, peak serum 7/15 4225 IU/ml.  Continue IV ganciclovir. Follow CMV PCR weekly, negative as of 8/3.  -EBV viremia:  Peak serum 406,697 copies/ml on 7/18.  Down to 6864 as of 8/1.  Check weekly.  -R/o SBP or abscess:  8/5 paracentesis with 250ml removed.  , cx broth only + GPC.  Per ID, no abx for now.  Neuro: Toxic metabolic encephalopathy secondary to infection, prolonged hospital stay, significant improvement although somewhat disoriented at times.  Vascular:  Some evidence of microvascular injury in digits (toes) this is most likely due to injury while patient was on pressor therapy with sepsis.  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   Activity: Deconditioned due to prolong hospital course. Daily PT/OT, encourage pt to be OOB to chair. Encourage pulmonary toilet.   Prophylaxis: DVT-Heparin SQ  Disposition: 7A. LTACH referral.      Medical Decision Making: Medium    PILLO/Fellow/Resident Provider:  Evette Pennington NP [id number]    Faculty: Jovan Tran MD    __________________________________________________________________  Transplant History:.  3/4/2017 DD OLT with Dr. Tran (Liver), Postoperative day: 156     Interval History:   Overnight events:  Disoriented overnight, more oriented this morning.  Nausea with emesis x1 and hiccups.  Concerned that home Endocrinologist would be upset with current insulin regimen.  Contacted Dr. Eckert, and he reported not having seen Camacho since June, no concerns.      ROS:   A 10-point review of systems was negative except as noted above.    Meds:    diphenoxylate-atropine  5 mL Oral BID     tacrolimus  6 mg Oral BID IS     fluconazole  100 mg Oral Daily     insulin glargine  24 Units Subcutaneous Q24H     fludrocortisone  0.1 mg Oral Daily     insulin isophane human  17 Units Subcutaneous QAM     sodium chloride (PF)  3 mL  Intracatheter Q8H     ganciclovir (CYTOVENE) intermittent infusion  7.5 mg/kg Intravenous Q12H     sulfamethoxazole-trimethoprim  1 tablet Oral Daily     loperamide  4 mg Oral or Feeding Tube 4x Daily     insulin aspart  1-12 Units Subcutaneous Q4H     fiber modular  1 packet Per Feeding Tube TID     insulin aspart   Subcutaneous TID w/meals     protein modular  1 packet Per Feeding Tube TID     aspirin  80 mg Oral Daily     multivitamins with minerals  15 mL Per Feeding Tube Daily     heparin  5,000 Units Subcutaneous Q8H     pantoprazole  40 mg Oral or Feeding Tube Daily     sodium chloride (PF)  3 mL Intracatheter Q8H       Physical Exam:     Admit Weight: 94.2 kg (207 lb 11.2 oz) (SCALE 2)    Current vitals:   /78 (BP Location: Right arm)  Pulse 90  Temp 98.7  F (37.1  C) (Oral)  Resp 18  Ht 1.829 m (6')  Wt 92.6 kg (204 lb 1.6 oz)  SpO2 95%  BMI 27.68 kg/m2    Vital sign ranges:    Temp:  [97.4  F (36.3  C)-99.2  F (37.3  C)] 98.7  F (37.1  C)  Pulse:  [84-90] 90  Heart Rate:  [83-92] 83  Resp:  [16-18] 18  BP: (142-159)/(67-85) 142/78  SpO2:  [93 %-97 %] 95 %  Patient Vitals for the past 24 hrs:   BP Temp Temp src Pulse Heart Rate Resp SpO2   08/07/17 1130 142/78 98.7  F (37.1  C) Oral 90 - 18 95 %   08/07/17 0700 149/81 98.5  F (36.9  C) - 84 - 18 95 %   08/07/17 0417 152/78 98.9  F (37.2  C) Axillary 89 - 17 93 %   08/06/17 2334 159/67 98.8  F (37.1  C) Oral 87 - 16 96 %   08/06/17 1946 148/85 99.2  F (37.3  C) Oral - 83 16 96 %   08/06/17 1553 146/83 97.4  F (36.3  C) Axillary - 92 16 97 %     General Appearance: NAD  Skin: normal, warm, dry  Heart: RRR  Lungs: 93% RA  Abdomen: soft, non distended. Ileostomy with brown output. Mucus fistula, pink. Midline incision, c/d/i.   : No vazquez.   Extremities: No edema. Ext NT x 4.  Necrotic blackened areas on right 1st & 2nd toes and left 2nd toe.  Neurologic: A&O x 3-4, waxes and wanes at times.       Data:   CMP    Recent Labs  Lab 08/07/17  0585  08/06/17  1707 08/06/17  0633  08/02/17  0543    140 141  < > 150*   POTASSIUM 5.4* 5.4* 5.4*  < > 4.5   CHLORIDE 114* 115* 114*  < > 123*   CO2 18* 18* 19*  < > 21   GLC 99 108* 98  < > 112*   BUN 51* 49* 47*  < > 49*   CR 1.06 1.01 0.95  < > 0.91   GFRESTIMATED 73 77 83  < > 87   GFRESTBLACK 88 >90African American GFR Calc >90African American GFR Calc  < > >90African American GFR Calc   JACOB 8.2* 7.9* 8.1*  < > 8.0*   MAG 2.1  --  2.2  < > 2.0   PHOS 4.9*  --  4.7*  < > 3.2   ALBUMIN  --   --   --   --  2.3*   BILITOTAL  --   --   --   --  0.4   ALKPHOS  --   --   --   --  199*   AST  --   --   --   --  62*   ALT  --   --   --   --  42   < > = values in this interval not displayed.  CBC    Recent Labs  Lab 08/07/17  0549 08/06/17  1207 08/06/17  0633   HGB 7.7* 8.3* 7.6*   WBC 5.5  --  5.2   *  --  94*     COAGS    Recent Labs  Lab 08/05/17  0949   INR 1.19*      Urinalysis  Recent Labs   Lab Test  08/05/17   0617  07/28/17   1042   06/14/17   1508   04/11/16   1345   COLOR  Yellow  Yellow   < >   --    < >  Yellow   APPEARANCE  Slightly Cloudy  Slightly Cloudy   < >   --    < >  Clear   URINEGLC  Negative  Negative   < >   --    < >  30*   URINEBILI  Negative  Negative   < >   --    < >  Negative   URINEKETONE  Negative  Negative   < >   --    < >  Negative   SG  1.018  1.012   < >   --    < >  1.016   UBLD  Negative  Trace*   < >   --    < >  Small*   URINEPH  5.0  5.0   < >   --    < >  5.0   PROTEIN  30*  30*   < >   --    < >  30*   NITRITE  Negative  Negative   < >   --    < >  Negative   LEUKEST  Negative  Negative   < >   --    < >  Negative   RBCU  0  5*   < >   --    < >  1   WBCU  5*  6*   < >   --    < >  1   UTPG   --    --    --   1.55*   --   0.41*    < > = values in this interval not displayed.          7/21/2017   SPECIMEN(S):   A: Colon biopsy, ascending   B: Colon biopsy, descending     FINAL DIAGNOSIS:   A. COLON BIOPSY, ASCENDING:   - Colonic mucosa with no significant histologic  "abnormality   - Negative for intraepithelial lymphocytosis or deposition of   subepithelial thick collagenous band     B. COLON BIOPSY, DESCENDING:   - Crypt architecture distortion, consistent with healed prior injury   - Negative for active inflammation or dysplasia   - Negative for intraepithelial lymphocytosis or deposition of   subepithelial thick collagenous band   - Please, see comment     COMMENT:   Specimen B, \"descending colon\" features lamina propria edema, slight   variation in crypt sizes and shapes and occasional crypt drop-out.  This   may indicate a resolving injury which could be drug-induced or   infectious.     CT scan 7/25/2017:   IMPRESSION:   1. New large heterogeneous area of right-sided retroperitoneal gas  which is highly concerning for an infectious process. Given the  history of prior neutropenic colitis and biopsies, there could also be  a component of stool from a ruptured viscus, despite the lack of  surrounding bowel loops and no extraluminal oral contrast. Surgical  consultation is recommended.      2. Bibasilar atelectasis versus consolidation with small bilateral  pleural effusions.     3. Extensive mesenteric and soft tissue edema with large volume  ascites. Numerous mesenteric and retroperitoneal lymph nodes which are  presumably reactive.     4. Postsurgical changes of liver transplant.     [Urgent Result: Retroperitoneal gas]     Finding was identified on 7/25/2017 5:34 PM.      Dr. Santoyo was contacted by Dr. Atkins at 7/25/2017 5:37 PM and  verbalized understanding of the urgent finding.      I have personally reviewed the examination and initial interpretation  and I agree with the findings.     LUCI HOROWITZ, [facility]  "

## 2017-08-07 NOTE — PROGRESS NOTES
CLINICAL NUTRITION SERVICES - REASSESSMENT NOTE     Nutrition Prescription    RECOMMENDATIONS FOR MDs/PROVIDERS TO ORDER:    Recommend combination DD3 + 2 gm K+ diet while hyperkalemia persists.     Malnutrition Status:    Non-severe malnutrition in the context of acute on chronic illness    Recommendations already ordered by Registered Dietitian (RD):  To support wound healing:  Change MVI with minerals to Latricia Plus 5 ml daily (water miscible fat soluble vitamin replacement) d/t diarrhea/malabsorption for 10 days    Add vitamin C 250 mg daily x 10 days    Add zinc 220 ml daily x 10 days    Increased Nutrisource Fiber to 5 packets/day.      Future/Additional Recommendations:  If hyperkalemia improves, patient could benefit from semi-elemental TF formula (Peptamen 1.5 @ 80 ml/hr + 1 pkt Prosource TF).     If potassium level improves, consider oral supplements (would not feel comfortable giving Ensure Plus/Magic Cup at this time d/t elevated K+)     EVALUATION OF THE PROGRESS TOWARD GOALS   Diet: DD3 with thin liquids (advanced 8/7)  Nutrition Support: Nepro @ goal 60 ml/hr (1440 ml/day) to provide 2592 kcals (28 kcal/kg/day), 117 g PRO (1.3 g/kg/day), 1051 ml free H2O, 232 g CHO and 18 g Fiber daily.  H2O Flushes: 50 ml q 1 hour  Prosource 1 packet TID (3 packets = 120 kcals and 33 g PRO)  Nutrisource Fiber 1 packet TID (9 g Fiber)    Has received 7 day average of 1440 ml TF volumes daily (100% goal volume)     NEW FINDINGS     WOC nurse note on (8/3) indicates sacral wound (DTPI) and ear lobe wound (Stage 2)    Ileostomy output: 7 day average of 1600 ml daily out (improving slowly), on imodium QID, lomotil BID, fiber TID    Mental status waxes/wanes    MALNUTRITION  % Intake: < 75% for > 7 days (non-severe)  % Weight Loss: None noted  Subcutaneous Fat Loss: Facial region and Thoracic/intercostal:  At least Mild - per previous  Muscle Loss: Temporal, Facial & jaw region, Thoracic region (clavicle, acromium bone,  deltoid, trapezius, pectoral) and Upper leg (quadricep, hamstring):  Mild to Moderate - per previous  Fluid Accumulation/Edema: Mild: 2+ generalized  Malnutrition Diagnosis: Non-severe malnutrition in the context of acute on chronic illness    Previous Goals   Total avg nutritional intake to meet a minimum of 25 kcal/kg and 1.5 g PRO/kg daily (per dosing wt 92 kg).  Evaluation: Not met    Previous Nutrition Diagnosis  Inadequate enteral nutrition infusion   Evaluation: Improving    CURRENT NUTRITION DIAGNOSIS  Altered GI status related to high ileostomy outputs AEB patient with ileostomy outputs averaging ~1600 ml/day.     INTERVENTIONS  Implementation  Micronutrient changes as per above  Paged team re: fiber recommendation    Goals  Total avg nutritional intake to meet a minimum of 25 kcal/kg and 1.5 g PRO/kg daily (per dosing wt 92 kg).    Monitoring/Evaluation  Progress toward goals will be monitored and evaluated per protocol.      Janice Salomon MS, RD, LD  Pager [phone]

## 2017-08-07 NOTE — PROGRESS NOTES
"[facility]  WO Nurse Inpatient Adult Pressure INJURY (PI) Wound Assessment     Follow up assessment of PU(s) on pt's:  Ileostomy   Follow up assessment of PI on- left ear lobe and Sacrum      Data:   Patient History:      per MD note(s): This is a 51 year old male with complex PMH of CASTAÑEDA cirrhosis s/p Liver transplant Mar 2017.  Was admitted on 2014 with abdominal pain, sepsis, CT at OSH showed \"right colonic wall thickening suggesting colitis\" underwent Ex-Lap and washout for neutropenic colitis, intra-op was noted to have inflammed cecum and ascending colon with murky fluid.  Abdomen was left open at the time and was closed on 2017.  Some concern for CMV colitis with low count CMV viremia/GI PTLD and subsequently underwent colonoscopy on :  No colitis was seen on visualized portions of mucosa.     Current mattress:  Pulsate mattress   Current pressure relieving devices: Foam dressing, pt able to turn independently    Moisture Management:  Incontinence Protocol      Current Diet / Nutrition:       Active Diet Order      Dysphagia Diet Level 3 Advanced Thin Liquids (water, ice chips, juice, milk gelatin, ice cream, etc)      Kwasi Score  Av.5  Min: 10  Max: 22       Labs:     Recent Labs   Lab Test  17   0546   17   0410  17   2243   17   0316   17   1810   ALBUMIN   --    --   2.4*   --    < >   --    < >   --    HGB  7.7*   < >  9.3*  9.1*   < >  7.1*   < >  7.3*   INR   --    --    --   1.31*   < >  1.80*   < >   --    WBC  4.9   < >  10.0  10.3   < >  0.8*   < >  0.8*   A1C   --    --    --    --    --   Canceled, Test credited   Below Assay Range  NOTIFIED LEONARD ONEILL AT 0538 ON 17 BY CHRISSY     --    --    CRP   --    --    --    --    --    --    --   354.0    < > = values in this interval not displayed.                                                                                                                        Pressure Injury " Assessment  (location #1):   Sacrum  Wound History:   Pt was transferred from  last week. Had multiple procedures throughout the day on 7/25/17 per RN. Unable to take picture on Monday due to pt being in pain with turning.        7/26/17  8/3/17- Unable to take picture again today as pt wanted to roll on his back  8/7/17- Unable to take picture again today as pt wanted to roll on his back due to pain    Wound Base: moist pink and part of the wound bed is maroon tissue    Specific Dimensions (length x width x depth, in cm) :   3.5x2.2x0.1cm ( not able to measure today)    Tunneling:  N/A    Undermining: N/A    Palpation of the wound bed:  Firm on bone    Slough appearance:  none    Eschar appearance:  none    Periwound Skin: moist skin      Color: pink    Temperature  normal     Drainage:  Minimal serous         Odor: none    Pain:  painful  Pressure Injury Assessment  (location #2):   Left ear lobe (not assessed today)  Wound History:   Noted this injury during assessing the pt for sacral pressure injury last week. Now able to turn his head independently        7/26/17                                                                  8/3/17    Wound Base: dry superficial thin scab    Specific Dimensions (length x width x depth, in cm) :   2x0.8x0.0cm    Tunneling:  N/A    Undermining: N/A    Palpation of the wound bed:  none    Slough appearance:  none    Eschar appearance:  none    Periwound Skin: intact     Color: pink    Temperature  normal     Drainage:  none         Odor: none    Pain:  none             Intervention:     Patient's chart evaluated.      Kwasi Interventions:  Current Kwasi Interventions and Care Plan reviewed and updated, appropriate at this time.    Wound was assessed.    Wound Care: was done: per POC mentioned below,    Orders  Written    Supplies  Reviewed    Discussed plan of care with Nurse           Assessment:     Pressure Injury (PI) located on Sacrum: DTPI  left ear lobe- stage  "2    Status: wound  Follow up assessment, Symptomatic, Evolving        New ileostomy and Mucus fistula- Needs max assistance         Plan:     Nursing to notify the Provider(s) and re-consult the WO Nurse if wound(s) deteriorate(s).  Plan of care for wound located on Sacrum :   Cleanse the area with NS and pat dry.  Apply No sting barrier to periwound skin.  Apply triad paste lightly only on open areas.  Cover wound with Sacral Mepilex (#[id number])  Change dressing Q 3 days or PRN.  Turn and reposition Q 2hrs.  Ensure pt has Adolph-cushion while sitting up in the chair.  Ensure pt is on pulsate mattress.       Plan of care for wound located on left ear lobe: Monitor the area closely.     WO Nurse will return: twice a week  Face to face time: 30 mins  [facility]  WO Nurse Inpatient Ostomy Assessment      Follow up assessment of ostomy and needs for:  Ileostomy and Mucus fistula       Data:   History:    This is a 51 year old male with complex PMH of CASTAÑEDA cirrhosis s/p Liver transplant Mar 2017.  Was admitted on 7/4/2014 with abdominal pain, sepsis, CT at OSH showed \"right colonic wall thickening suggesting colitis\" underwent Ex-Lap and washout for neutropenic colitis, intra-op was noted to have inflammed cecum and ascending colon with murky fluid.  Abdomen was left open at the time and was closed on 7/5/2017.  Some concern for CMV colitis with low count CMV viremia/GI PTLD and subsequently underwent colonoscopy on 7/21:  No colitis was seen on visualized portions of mucosa.  Exam sub-optimal as colon filled with solid stool.  Random biopsies obtained.  On 7/25/2017 pt became more tachycardic, increasing O2 needs and altered, pt was intubated, hypotension responsive to IVFs, worsening kidney function.  Has not required pressors.  CT was obtained which showed a new large heterogeneous right sided retroperitoneal gas and air tracking along duodenum.  No contrast extravasation.  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  Despite conservative management with bowel rest and IV abx, pt continued to spike fevers.  Now s/p Exploratory laparotomy, right angelique-colectomy, end ileostomy, mucus fistula, partial omentectomy on 7/26.    Type of ostomy:  Ileostomy and Mucus fistula  Stoma assessment:   ? stoma:  32mm\" , viable, pink, pale, round, moist, and protruberant  ? A bridge in place?: No  ? Stent(s) in place?: Not applicable,   Mucocutaneous Junction (MCJ):  Intact on both  Peristomal skin:  Intact on both  Abdominal assessment: soft   ? N/G still in place?:  Yes   Output:  small, watery, green,   I/O last 3 completed shifts:  In: 1875 [I.V.:150; NG/GT:465]  Out: 2075 [Urine:275; Stool:1800]  Current pouching system:  Fulton,  two piece, cut to fit and flat and barrier ring with minimal melting around the cutting edges.   Pain:  Complaining of pain during cleansing  ? Is pt still on a PCA? No    Diet:         Active Diet Order      Dysphagia Diet Level 3 Advanced Thin Liquids (water, ice chips, juice, milk gelatin, ice cream, etc)            Intervention:     Patient's chart evaluated.      Assessments done today:  Stoma, peristomal skin, and learning needs  ? Participants: pt and RN  ?  Educational intervention: method: Replaced pouch using below mentioned supplies and ordered supplies for next pouch change  Prepared for discharge by: No discharge preparations started,          Assessment:     Stoma assessment: viable and healthy    Complications: None    Learning needs/ comprehension: Will plan on initiating teaching when able.        Is pt able to demonstrate: 1. how to empty their pouch?  No:   2. How to read and write down correct I and O's?   No:     Effectiveness of current pouching/ supply plan: > 72 hrs         Plan:     Plan:   ? Current pouching system: Fulton 57 mm  2 pc flat tra high output pouch with barrier ring    Education:  ? Educational needs: Use of tape, How to empty " pouch, Removal of pouch, Preparation of new pouch, Cutting out or evaluating a pattern, Applying paste or rings, Applying appliance to abdomen, Peristomal skin care, Diet and hydration / fluid balance, Odor / flatus management and Lifestyle Adjustments    ? Continue to prepare for discharge:  Supplies ordered, Completed supply list, Registered for samples from , Prescriptions or note left on chart for MD to sign/complete, Prepared for discharge to home with homecare, Discussed making a [facility] Nurse outpatient appointment upon discharge, Discussed when to follow up with a WO Nurse in the future and Discussed how/ where to order supplies   ? Do WO Nurse recommend home care?  Possible TCU   Plan for ileostomy and Mucus fistula: RN to change pouch twice a week. [facility] will initiate teaching once pt is stable.  Supplies Needed  Kyara (2pc 57mm)  Wafer- (#[id number])  Pouch- (#[id number])  or  High output pouch - (#[id number]) depending upon output  Barrier ring- (#[id number])     Ileostomy care- Change pouch twice a week  1. Gather all supplies and remove old pouch gently.  2. Cleanse peristomal skin with w arm water and soft wash cloth or gauze and dry thoroughly.  3. Cut the wafer to fit to stoma or use given pattern, apply barrier ring around the cutting surface and apply centering the stoma.  4. Attach the pouch   5. Massage around it for better adherence.     Face to face time: total 40 mins

## 2017-08-07 NOTE — PLAN OF CARE
Problem: Goal Outcome Summary  Goal: Goal Outcome Summary  ST 7A: Recommend advance diet to dysphagia level 3 with thin liquids as tolerated. Pt to sit upright for all PO intake, take small bites/sips and alternate consistencies. ST to follow for PO tolerance and to assist with further diet advance as appropriate.

## 2017-08-07 NOTE — PLAN OF CARE
Problem: Goal Outcome Summary  Goal: Goal Outcome Summary     [id number] Slightly hypertensive, BPs 140-150s/60s-80s overnight; Tmax 99.2, OVSS on RA; no s/s of distress. Pt alert but disoriented x4, however able to voice discomfort. Denied n/v. Did c/o back discomfort--PRN Oxy suspension given x2 with relief. Pt slept intermittently through the night between cares, often talking to himself and muttering. Also pulling on brief and often pulling off his gown; reoriented pt often. VPM monitoring in use. Bed exit armed d/t impulsivity. Pt able to turn in bed with encouragement. Linens changed multiple times during shift d/t urinary incontinence 3-4x and pt pulling off brief. End ileostomy bag changed d/t clog in vazquez bag (stool was liquid/viscous) and d/t small whole in colostomy bag d/t pt pulling at it--emptied 750ml liquid/brown stool. Mucous fistula on left side c/d/i with colostomy bag covering it; no drainage overnight. Abdominal incision stapled, ROC. RIJ SL x3. Continuous TF @60ml/hr through nasoduodenal tube with 50ml water flushes every hour. Q4 BG checks, 136, 119, &99, no SS coverage needed. Pt turned [id number] to ease pressure on sacrum wound, and resting quietly now, call light and belongings within reach. Will continue to monitor and continue POC/update team as needed.

## 2017-08-07 NOTE — PROGRESS NOTES
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  Now s/p ex lap with R hemicolectomy, end ileostomy, mucus fistula, partial omenectomy on 7/26.    Glucose control has been quite good.  Pt declining ordered insulin.    Plan:  -transplant will contact pt's outpatient endocrinologist Dr. Eckert of [facility].  If pt agreeable, we will continue to follow.      Recommend (active orders):  - NPH 17 units qAM (pt refused today)  -glargine decreased from 28 to 24 units qPM  -meal aspart 1unit/7g CHO ordered for meals and snacks  -aspart high correction q4h   -Please run D10 at nutritional support rate (~75ml/h) if TFs are interrupted (prn order)    Please notify diabetes team of changes planned for nutrition support schedule.     Outpatient diabetes follow up: Dr. Eckert at [facility]  Plan reviewed with pt and bedside nurse and primary team             Interval History:   The last 24 hours progress and nursing notes reviewed.  Nepro at 60 cc/h continuous continues. Diet: full liquid/dysphagia, may advance  Creatinine rising slightly.    Insulin need has been dropping.  This morning pt vomited and staff noted him to be much clearer mentally.  He refused his HUmulin N, stating that's not what he uses and his usual endocrinologist does not like how his regimen gets changed when he's hospitalized.  He told the nurse he wanted his Humulin (meaning his old U-500).  He hasn't been on U500 since before transplant according to our information (including info from his outpt endo office).  Pt read the glucose accordion snapshot, which showed glucose values and the  insulins used.  Explained this is to meet current needs and that we're not trying to make an outpatient plan at this time.  He wants to discharge soon.  He also said he prefers to have as few people involved in his care as possible.  Said he planned to call Dr. Eckert.  Gave transplant permission to call [facility] on his behalf.      Recent Labs  Lab 08/07/17  1136 08/07/17  0850 08/07/17  0549 08/07/17  0416 08/06/17  2332 08/06/17  2009 08/06/17  1707 08/06/17  1558  08/06/17  0633  08/05/17  0616  08/04/17  1721  08/04/17  0548   GLC  --   --  99  --   --   --  108*  --   --  98  --  92  --  102*  --  89   * 133*  --  99 119* 136*  --  134*  < >  --   < >  --   < >  --   < >  --    < > = values in this interval not displayed.            Review of Systems:   See interval hx          Medications:   PTA taking Januvia and glargine versus glargine and aspart per carb    Active Diet Order      Full Liquid Diet     Physical Exam:  Gen:  Resting in bed, NAD.   HEENT: NC/AT,  NJ feeding tube  Resp: unlabored at rest  Neuro: alert, oriented to present, but missing events of the past several weeks while hospitalized    /78 (BP Location: Right arm)  Pulse 90  Temp 98.7  F (37.1  C) (Oral)  Resp 18  Ht 1.829 m (6')  Wt 92.6 kg (204 lb 1.6 oz)  SpO2 95%  BMI 27.68 kg/m2           Data:     Lab Results   Component Value Date    A1C  07/06/2017     Canceled, Test credited   Below Assay Range  NOTIFIED LEONARD ONEILL AT 0538 ON 7/6/17 BY CHRISSY      A1C 9.4 02/16/2017    A1C 6.2 12/14/2016    A1C 6.1 10/27/2016    A1C 8.3 07/02/2012            Recent Labs   Lab Test  08/07/17   0549  08/06/17   1707   NA  139  140   POTASSIUM  5.4*  5.4*   CHLORIDE  114*  115*   CO2  18*  18*   ANIONGAP  8  6   GLC  99  108*   BUN  51*  49*   CR  1.06  1.01   JACOB  8.2*  7.9*     CBC RESULTS:   Recent Labs   Lab Test  08/07/17   0549   WBC  5.5   RBC  2.71*   HGB  7.7*   HCT  23.7*   MCV  88   MCH  28.4   MCHC  32.5   RDW  17.5*    PLT  116*     Janice Guzman APRN [facility] [phone]    Diabetes Management job code [id number]

## 2017-08-08 ENCOUNTER — APPOINTMENT (OUTPATIENT)
Dept: OCCUPATIONAL THERAPY | Facility: CLINIC | Age: 52
End: 2017-08-08
Attending: TRANSPLANT SURGERY
Payer: COMMERCIAL

## 2017-08-08 ENCOUNTER — APPOINTMENT (OUTPATIENT)
Dept: SPEECH THERAPY | Facility: CLINIC | Age: 52
End: 2017-08-08
Attending: TRANSPLANT SURGERY
Payer: COMMERCIAL

## 2017-08-08 ENCOUNTER — APPOINTMENT (OUTPATIENT)
Dept: GENERAL RADIOLOGY | Facility: CLINIC | Age: 52
End: 2017-08-08
Attending: NURSE PRACTITIONER
Payer: COMMERCIAL

## 2017-08-08 LAB
ALBUMIN SERPL-MCNC: 2.1 G/DL (ref 3.4–5)
ALBUMIN UR-MCNC: 30 MG/DL
ALP SERPL-CCNC: 172 U/L (ref 40–150)
ALT SERPL W P-5'-P-CCNC: 25 U/L (ref 0–70)
ANION GAP SERPL CALCULATED.3IONS-SCNC: 7 MMOL/L (ref 3–14)
APPEARANCE UR: ABNORMAL
AST SERPL W P-5'-P-CCNC: 28 U/L (ref 0–45)
BACTERIA SPEC CULT: NO GROWTH
BACTERIA SPEC CULT: NO GROWTH
BILIRUB DIRECT SERPL-MCNC: 0.3 MG/DL (ref 0–0.2)
BILIRUB SERPL-MCNC: 0.5 MG/DL (ref 0.2–1.3)
BILIRUB UR QL STRIP: NEGATIVE
BUN SERPL-MCNC: 55 MG/DL (ref 7–30)
CALCIUM SERPL-MCNC: 8.2 MG/DL (ref 8.5–10.1)
CHLORIDE SERPL-SCNC: 111 MMOL/L (ref 94–109)
CO2 SERPL-SCNC: 17 MMOL/L (ref 20–32)
COLOR UR AUTO: YELLOW
CREAT SERPL-MCNC: 1.18 MG/DL (ref 0.66–1.25)
ERYTHROCYTE [DISTWIDTH] IN BLOOD BY AUTOMATED COUNT: 17.8 % (ref 10–15)
GFR SERPL CREATININE-BSD FRML MDRD: 65 ML/MIN/1.7M2
GLUCOSE BLDC GLUCOMTR-MCNC: 137 MG/DL (ref 70–99)
GLUCOSE BLDC GLUCOMTR-MCNC: 144 MG/DL (ref 70–99)
GLUCOSE BLDC GLUCOMTR-MCNC: 148 MG/DL (ref 70–99)
GLUCOSE BLDC GLUCOMTR-MCNC: 150 MG/DL (ref 70–99)
GLUCOSE BLDC GLUCOMTR-MCNC: 152 MG/DL (ref 70–99)
GLUCOSE BLDC GLUCOMTR-MCNC: 156 MG/DL (ref 70–99)
GLUCOSE BLDC GLUCOMTR-MCNC: 165 MG/DL (ref 70–99)
GLUCOSE BLDC GLUCOMTR-MCNC: 196 MG/DL (ref 70–99)
GLUCOSE BLDC GLUCOMTR-MCNC: 201 MG/DL (ref 70–99)
GLUCOSE SERPL-MCNC: 152 MG/DL (ref 70–99)
GLUCOSE UR STRIP-MCNC: NEGATIVE MG/DL
HCT VFR BLD AUTO: 24.5 % (ref 40–53)
HGB BLD-MCNC: 8.1 G/DL (ref 13.3–17.7)
HGB UR QL STRIP: NEGATIVE
INTERPRETATION ECG - MUSE: NORMAL
INTERPRETATION ECG - MUSE: NORMAL
KETONES UR STRIP-MCNC: NEGATIVE MG/DL
LACTATE BLD-SCNC: 1 MMOL/L (ref 0.7–2.1)
LEUKOCYTE ESTERASE UR QL STRIP: NEGATIVE
MAGNESIUM SERPL-MCNC: 2 MG/DL (ref 1.6–2.3)
MCH RBC QN AUTO: 29.2 PG (ref 26.5–33)
MCHC RBC AUTO-ENTMCNC: 33.1 G/DL (ref 31.5–36.5)
MCV RBC AUTO: 88 FL (ref 78–100)
MICRO REPORT STATUS: NORMAL
MICRO REPORT STATUS: NORMAL
NITRATE UR QL: NEGATIVE
PH UR STRIP: 5 PH (ref 5–7)
PHOSPHATE SERPL-MCNC: 4.6 MG/DL (ref 2.5–4.5)
PLATELET # BLD AUTO: 110 10E9/L (ref 150–450)
POTASSIUM SERPL-SCNC: 5.8 MMOL/L (ref 3.4–5.3)
POTASSIUM SERPL-SCNC: 6.1 MMOL/L (ref 3.4–5.3)
PROCALCITONIN SERPL-MCNC: 3.11 NG/ML
PROT SERPL-MCNC: 6.4 G/DL (ref 6.8–8.8)
RBC # BLD AUTO: 2.77 10E12/L (ref 4.4–5.9)
RBC #/AREA URNS AUTO: 3 /HPF (ref 0–2)
SODIUM SERPL-SCNC: 136 MMOL/L (ref 133–144)
SP GR UR STRIP: 1.01 (ref 1–1.03)
SPECIMEN SOURCE: NORMAL
SPECIMEN SOURCE: NORMAL
TACROLIMUS BLD-MCNC: ABNORMAL UG/L (ref 5–15)
TME LAST DOSE: ABNORMAL H
TROPONIN I SERPL-MCNC: 0.02 UG/L (ref 0–0.04)
URN SPEC COLLECT METH UR: ABNORMAL
UROBILINOGEN UR STRIP-MCNC: NORMAL MG/DL (ref 0–2)
WBC # BLD AUTO: 9.4 10E9/L (ref 4–11)
WBC #/AREA URNS AUTO: 7 /HPF (ref 0–2)

## 2017-08-08 PROCEDURE — 12000025 ZZH R&B TRANSPLANT INTERMEDIATE

## 2017-08-08 PROCEDURE — 25000132 ZZH RX MED GY IP 250 OP 250 PS 637: Performed by: STUDENT IN AN ORGANIZED HEALTH CARE EDUCATION/TRAINING PROGRAM

## 2017-08-08 PROCEDURE — 00000146 ZZHCL STATISTIC GLUCOSE BY METER IP

## 2017-08-08 PROCEDURE — 40000225 ZZH STATISTIC SLP WARD VISIT: Performed by: SPEECH-LANGUAGE PATHOLOGIST

## 2017-08-08 PROCEDURE — 71010 XR CHEST PORT 1 VW: CPT

## 2017-08-08 PROCEDURE — 36592 COLLECT BLOOD FROM PICC: CPT | Performed by: TRANSPLANT SURGERY

## 2017-08-08 PROCEDURE — 36592 COLLECT BLOOD FROM PICC: CPT | Performed by: PHYSICIAN ASSISTANT

## 2017-08-08 PROCEDURE — 25000132 ZZH RX MED GY IP 250 OP 250 PS 637: Performed by: PHYSICIAN ASSISTANT

## 2017-08-08 PROCEDURE — 93010 ELECTROCARDIOGRAM REPORT: CPT | Performed by: INTERNAL MEDICINE

## 2017-08-08 PROCEDURE — 25000128 H RX IP 250 OP 636: Performed by: STUDENT IN AN ORGANIZED HEALTH CARE EDUCATION/TRAINING PROGRAM

## 2017-08-08 PROCEDURE — 27210913 ZZH NUTRITION PRODUCT INTERMEDIATE PACKET

## 2017-08-08 PROCEDURE — 25000128 H RX IP 250 OP 636

## 2017-08-08 PROCEDURE — 83605 ASSAY OF LACTIC ACID: CPT | Performed by: TRANSPLANT SURGERY

## 2017-08-08 PROCEDURE — 93005 ELECTROCARDIOGRAM TRACING: CPT

## 2017-08-08 PROCEDURE — 92526 ORAL FUNCTION THERAPY: CPT | Mod: GN | Performed by: SPEECH-LANGUAGE PATHOLOGIST

## 2017-08-08 PROCEDURE — 25000132 ZZH RX MED GY IP 250 OP 250 PS 637: Performed by: TRANSPLANT SURGERY

## 2017-08-08 PROCEDURE — 97535 SELF CARE MNGMENT TRAINING: CPT | Mod: GO | Performed by: OCCUPATIONAL THERAPIST

## 2017-08-08 PROCEDURE — 25000128 H RX IP 250 OP 636: Performed by: NURSE PRACTITIONER

## 2017-08-08 PROCEDURE — 83735 ASSAY OF MAGNESIUM: CPT | Performed by: PHYSICIAN ASSISTANT

## 2017-08-08 PROCEDURE — 27210432 ZZH NUTRITION PRODUCT RENAL BASIC LITER

## 2017-08-08 PROCEDURE — 81001 URINALYSIS AUTO W/SCOPE: CPT | Performed by: NURSE PRACTITIONER

## 2017-08-08 PROCEDURE — 25000131 ZZH RX MED GY IP 250 OP 636 PS 637: Performed by: TRANSPLANT SURGERY

## 2017-08-08 PROCEDURE — 36592 COLLECT BLOOD FROM PICC: CPT | Performed by: STUDENT IN AN ORGANIZED HEALTH CARE EDUCATION/TRAINING PROGRAM

## 2017-08-08 PROCEDURE — 85027 COMPLETE CBC AUTOMATED: CPT | Performed by: PHYSICIAN ASSISTANT

## 2017-08-08 PROCEDURE — 84145 PROCALCITONIN (PCT): CPT | Performed by: NURSE PRACTITIONER

## 2017-08-08 PROCEDURE — 25800025 ZZH RX 258

## 2017-08-08 PROCEDURE — 25000132 ZZH RX MED GY IP 250 OP 250 PS 637: Performed by: NURSE PRACTITIONER

## 2017-08-08 PROCEDURE — 97530 THERAPEUTIC ACTIVITIES: CPT | Mod: GO | Performed by: OCCUPATIONAL THERAPIST

## 2017-08-08 PROCEDURE — 84132 ASSAY OF SERUM POTASSIUM: CPT | Performed by: NURSE PRACTITIONER

## 2017-08-08 PROCEDURE — 25000131 ZZH RX MED GY IP 250 OP 636 PS 637: Performed by: NURSE PRACTITIONER

## 2017-08-08 PROCEDURE — 84484 ASSAY OF TROPONIN QUANT: CPT | Performed by: STUDENT IN AN ORGANIZED HEALTH CARE EDUCATION/TRAINING PROGRAM

## 2017-08-08 PROCEDURE — 25000128 H RX IP 250 OP 636: Performed by: PHYSICIAN ASSISTANT

## 2017-08-08 PROCEDURE — 25000125 ZZHC RX 250

## 2017-08-08 PROCEDURE — 80076 HEPATIC FUNCTION PANEL: CPT | Performed by: PHYSICIAN ASSISTANT

## 2017-08-08 PROCEDURE — 36592 COLLECT BLOOD FROM PICC: CPT | Performed by: NURSE PRACTITIONER

## 2017-08-08 PROCEDURE — 80048 BASIC METABOLIC PNL TOTAL CA: CPT | Performed by: PHYSICIAN ASSISTANT

## 2017-08-08 PROCEDURE — 80197 ASSAY OF TACROLIMUS: CPT | Performed by: PHYSICIAN ASSISTANT

## 2017-08-08 PROCEDURE — 84100 ASSAY OF PHOSPHORUS: CPT | Performed by: PHYSICIAN ASSISTANT

## 2017-08-08 PROCEDURE — 25000131 ZZH RX MED GY IP 250 OP 636 PS 637

## 2017-08-08 PROCEDURE — 40000133 ZZH STATISTIC OT WARD VISIT: Performed by: OCCUPATIONAL THERAPIST

## 2017-08-08 PROCEDURE — 25000132 ZZH RX MED GY IP 250 OP 250 PS 637: Performed by: SURGERY

## 2017-08-08 RX ORDER — GUAR GUM
1 PACKET (EA) ORAL
Status: DISCONTINUED | OUTPATIENT
Start: 2017-08-08 | End: 2017-08-10 | Stop reason: ALTCHOICE

## 2017-08-08 RX ORDER — ONDANSETRON 2 MG/ML
4 INJECTION INTRAMUSCULAR; INTRAVENOUS ONCE
Status: COMPLETED | OUTPATIENT
Start: 2017-08-08 | End: 2017-08-08

## 2017-08-08 RX ORDER — FUROSEMIDE 10 MG/ML
40 INJECTION INTRAMUSCULAR; INTRAVENOUS ONCE
Status: COMPLETED | OUTPATIENT
Start: 2017-08-08 | End: 2017-08-08

## 2017-08-08 RX ADMIN — Medication 1 PACKET: at 13:37

## 2017-08-08 RX ADMIN — CALCIUM GLUCONATE 50 ML/HR: 94 INJECTION, SOLUTION INTRAVENOUS at 20:00

## 2017-08-08 RX ADMIN — GANCICLOVIR SODIUM 650 MG: 500 INJECTION, POWDER, LYOPHILIZED, FOR SOLUTION INTRAVENOUS at 20:13

## 2017-08-08 RX ADMIN — TACROLIMUS 8 MG: 5 CAPSULE ORAL at 17:42

## 2017-08-08 RX ADMIN — Medication 1 PACKET: at 20:36

## 2017-08-08 RX ADMIN — Medication 5 ML: at 20:13

## 2017-08-08 RX ADMIN — HEPARIN SODIUM 5000 UNITS: 5000 INJECTION, SOLUTION INTRAVENOUS; SUBCUTANEOUS at 01:31

## 2017-08-08 RX ADMIN — SODIUM CHLORIDE 1000 ML: 9 INJECTION, SOLUTION INTRAVENOUS at 18:53

## 2017-08-08 RX ADMIN — Medication 5 ML: at 08:39

## 2017-08-08 RX ADMIN — Medication 1 PACKET: at 08:42

## 2017-08-08 RX ADMIN — LOPERAMIDE HYDROCHLORIDE 4 MG: 2 SOLUTION ORAL at 22:27

## 2017-08-08 RX ADMIN — ONDANSETRON 4 MG: 2 INJECTION INTRAMUSCULAR; INTRAVENOUS at 18:23

## 2017-08-08 RX ADMIN — GANCICLOVIR SODIUM 650 MG: 500 INJECTION, POWDER, LYOPHILIZED, FOR SOLUTION INTRAVENOUS at 11:12

## 2017-08-08 RX ADMIN — PANTOPRAZOLE SODIUM 40 MG: 40 TABLET, DELAYED RELEASE ORAL at 08:39

## 2017-08-08 RX ADMIN — OXYCODONE HYDROCHLORIDE 10 MG: 5 SOLUTION ORAL at 01:58

## 2017-08-08 RX ADMIN — MULTIVIT AND MINERALS-FERROUS GLUCONATE 9 MG IRON/15 ML ORAL LIQUID 15 ML: at 08:43

## 2017-08-08 RX ADMIN — ONDANSETRON 4 MG: 2 INJECTION INTRAMUSCULAR; INTRAVENOUS at 16:45

## 2017-08-08 RX ADMIN — OXYCODONE HYDROCHLORIDE 10 MG: 5 SOLUTION ORAL at 22:27

## 2017-08-08 RX ADMIN — TACROLIMUS 80 MCG/HR: 5 INJECTION, SOLUTION INTRAVENOUS at 18:23

## 2017-08-08 RX ADMIN — Medication 1 PACKET: at 21:58

## 2017-08-08 RX ADMIN — TACROLIMUS 6 MG: 5 CAPSULE ORAL at 08:39

## 2017-08-08 RX ADMIN — OXYCODONE HYDROCHLORIDE 10 MG: 5 SOLUTION ORAL at 08:38

## 2017-08-08 RX ADMIN — HEPARIN SODIUM 5000 UNITS: 5000 INJECTION, SOLUTION INTRAVENOUS; SUBCUTANEOUS at 08:41

## 2017-08-08 RX ADMIN — SULFAMETHOXAZOLE AND TRIMETHOPRIM 1 TABLET: 400; 80 TABLET ORAL at 08:40

## 2017-08-08 RX ADMIN — Medication 80 MG: at 08:39

## 2017-08-08 RX ADMIN — ALTEPLASE 2 MG: 2.2 INJECTION, POWDER, LYOPHILIZED, FOR SOLUTION INTRAVENOUS at 18:02

## 2017-08-08 RX ADMIN — FLUCONAZOLE 100 MG: 100 TABLET ORAL at 08:40

## 2017-08-08 RX ADMIN — OXYCODONE HYDROCHLORIDE 10 MG: 5 SOLUTION ORAL at 13:10

## 2017-08-08 RX ADMIN — FLUDROCORTISONE ACETATE 0.1 MG: 0.1 TABLET ORAL at 08:41

## 2017-08-08 RX ADMIN — Medication 1 PACKET: at 17:43

## 2017-08-08 RX ADMIN — FUROSEMIDE 40 MG: 10 INJECTION, SOLUTION INTRAVENOUS at 18:52

## 2017-08-08 RX ADMIN — LOPERAMIDE HYDROCHLORIDE 4 MG: 2 SOLUTION ORAL at 08:39

## 2017-08-08 RX ADMIN — SODIUM CHLORIDE 1000 ML: 9 INJECTION, SOLUTION INTRAVENOUS at 11:12

## 2017-08-08 RX ADMIN — OXYCODONE HYDROCHLORIDE 5 MG: 5 SOLUTION ORAL at 18:23

## 2017-08-08 ASSESSMENT — PAIN DESCRIPTION - DESCRIPTORS: DESCRIPTORS: DISCOMFORT

## 2017-08-08 NOTE — PLAN OF CARE
Problem: Goal Outcome Summary  Goal: Goal Outcome Summary  Outcome: Declining  Patient continues to be hypertensive and tachy at times. T-max 100.0. Patient received oxycodone x2 for pain. He received zofran x1 for nausea. He is on a dysphagia level 3 diet with a poor diet. He is voiding spontaneously and adequate amounts. Incontinent at times. Ileostomy is patent and draining. NJ tube continues with tube feeds at 60cc/hr and 50 cc hourly water flushes. Blood sugars were 160 and 192. He continues on VPM. He continues to be confused and impulsive at times. Patient started to complain of chest pain this evening. Patient's team was paged an an EKG was ordered. EKG showed possible infarct, so a rapid response was called. Orders were placed for the patient to be transferred to unit 6B for closer monitoring. Report was given to Joe, the receiving nurse. Patient was transferred to 6B at 2245 this evening.

## 2017-08-08 NOTE — PROGRESS NOTES
[facility]  Transplant Infectious Diseases Progress Note      Camacho Bhagat MRN# [id number]   YOB: 1965 Age: 51 year old   Date of Service: 8/8/2017        Assessment and recommendations:   Recommendations:  - Would not add any empiric antimicrobials simply for fevers -- would restart empiric Zosyn / linezolid (or daptomycin) only if he becomes obviously significantly septic (e.g., with hypotension or hypoxia) or notably toxic.  Would much prefer to have a well-defined specific target that we treat next time we give him antibiotic therapy.  - Continue IV ganciclovir dosing after hemodialysis runs.  - Continue TMP-SMX prophylaxis.  - Fluconazole is being employed as a tacrolimus pharmacokinetic boost, but could consider using cobicistat or ritonavir instead.  - Repeat the serum CRP and the procalcitonin assay tomorrow for monitoring.  - Await the repeat UA and urine and blood cultures from today.  - Continue to monitor blood CMV PCR assays weekly (next due 8/10/17).    Case discussed with the Transplant Surgery team.  Transplant ID will follow with you.    Edwin Del Toro MD  Pager [phone]    Assessment:  A 51 year old gentleman immunosuppressed (tacrolimus; MMF on hold; received basiliximab initial induction) s/p liver transplant on 3/4/17 for CASTAÑEDA who was admitted 7/4/17 with colitis and underwent 7/26/17 exploratory laparotomy after forming a RUQ phlegmon possibly due to colonic perforation.  The initial admission diagnosis was neutropenic colitis, but new primary seroconversion CMV viremia was discovered on 7/10/17.  He had persistent fevers from 7/10 - 15/17 and again from 7/21 - 8/4/17, was afebrile for three+ days, but spiked another cryptogenic fever (110.7 degrees) last night and is more malaised today.    ID issues:    - Mostly resolved, intermittent cryptogenic fevers:  He spiked another idiopathic low-grade fever last night.  It was not clear whether the  "previous persisting fevers were secondary to CMV viremia or a superimposed abdominal infection, but he has now thankfully been consistently afebrile since 8/4/17 PM.  The fever decline seemed to perhaps best correspond to his decreasing blood CMV viral load.  He has had a prolonged hospital course with recurrent fever and encephalopathy despite broad spectrum antibiotics.  Blood cultures have remained negative and only colonizer elvie was found in BALs.  He underwent exploratory laparotomy with right angelique-colectomy / end ileostomy / mucous fistula / partial omentectomy on 7/26/17 where no perforation was seen but right colitis was observed with a probable intra-adominal abscess phlegmon.  He had residual right-sided abdominal findings (seen on 7/25/17 abdominal CT scan).  He completed ten days of broad spectrum antimicrobial therapy (meropenem / daptomycin / micafungin through 8/3/17.  Repeat abdominal CT scan on 8/4/17 showed stability / improvement in the abdominal findings (with a decrease in heterogenous area of retroperitoneal gas on the right side), although increased ascites, but little in the way of other likely infectious foci.  8/5/17 paracentesis is mostly benign (with some GPCs growing in broth only).  He is now afebrile with improved cognitive status, a normal peripheral WBC, and a stable (albeit still quite elevated post-op) serum CRP level.    - RUQ phlegmon s/p right hemicolectomy:  He had a right upper quadrant phlegmon on 7/25/17 CT scan presmably due to colonic perforation (seen on post-op histopathology) leading to the 7/26/17 exploratory laparotomy with right angelique-colectomy / end ileostomy / mucous fistula / partial omentectomy.  He completed ten days (7/25 - 8/3/17) of empiric meropenem / daptomycin / micafungin after the surgery.    - CMV viremia /  CMV colitis:  He presented with a \"detected\" low level CMV viremia (detected < 137 IU / ml) upon admission and developed a progressively worsening " viremia after stopping his Valcyte prophylaxis.  Valcyte was re-started on 7/15/17 when the blood CMV PCR viral load was 4,225 IU / ml (3.6 log) and that was switched to ganciclovir on 7/20/17 with a viral load of 1,906 IU /ml (3.3 log). After one week of GCV treatment, the blood CMV PCR dropped to 290 IU / ml (2.5 log) on 7/27/17 and decreased further to < 137 IU / ml (< 2.1 log) on 8/3/17 (a > 1 log drop in the viremia over two weeks).  The CMV immunostain of colonic tissue biopsy was positive; confirming a CMV etiology of his colitis.  Because of an initial slow pace of viremia decline, he had been treated with supra-high dose ganciclovir (doubled to 10 mg / kg / dose BID) for the possibility of ganciclovir resistance, but with the improving viral load on 8/3/17, his ganciclovir dose was reduced to 7.5 mg / kg IV BID on 8/4/17 PM -- we will watch weekly blood CMV PCR viral loads (next due to be checked 8/10/17) to make certain they remain suppressed at this lower dose.  (A CMV drug resistance genotype assay ordered on 7/27/17 went astray, so we have no genotypic data to help with decisions.)     - Improving encephalopathy:  His initial delirium and somnolence were likely toxic-metabolic encephalopathy precipitated by acute sepsis / medications side effects superimposed on chronic hepatic encephalopathy.  With defervescence and overall improvement, his mental status seems to be spontaneously clearing.  A 7/20/17 brain MRI scan was unremarkable and a repeat brain MRI has not yet been deemed needed.  An 8/5/17 serum cryptococcal antigen assay was negative.  The likelihood of any CNS infection at this time is exceeding low.    - Improved EBV viremia:  Initially discovered to have EBV viremia at 406,697  / ml on 7/18/17.  With decreased immunosuppression, that had fallen to 6,864  / ml by 8/1/17.  Checking blood EBV PCRs ~ weekly so far.  Random colonic biopsies from the 7/21/17 colonoscopy and 7/26/17  hemicolectomy histopathology results were not suggestive for PTLD.    - Multifactorial pancytopenia, resolved neutropenia:  Was likely due to medication and CMV viremia.  Neutropenia has resolved.    - S/p liver transplant:  Continue on tacrolimus; but MMF was held 6/27/17 in the context of sepsis and pancytopenia.    Old ID issues:  - Resolved EJ:  Was due to severe sepsis.  Creatinine is now down to < 1.   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  Nevertheless, he received a full ten day (7/25 - 8/3/17) daptomycin course for severe sepsis in the setting of colonization with VRE.   -  Staphylococcus capitis in ascitic fluid 7/5/17:  Was culture contamination.   - Rhinovirus in BAL 7/15/2017:  Likely to be due to chronic shedding, since his main presentation was abdominal, not respiratory.    Other ID issues:  - PCP prophylaxis: TMP-SMX was held in 6/17 due to neutropenia, but resumed 8/4/17.  - Serostatus:  CMV D+/R-, EBV D+/R-, HSV1?/2?, VZV ?.  Presently on IV ganciclovir.  - Immunization status: up-to-date.  - Gamma globulin status: >1660 as of 7/24/2017  - Isolation: Routine.    Interval History:  Mr. Bhagat was afebrile (T max 99.5 degrees) 8/4/17 midday until last night on only ongoing IV ganciclovir (since 7/20/17) plus TMP-SMX prophylaxis (since 8/4/17), but spiked another fever to 100.7 degrees again overnight and earlier this morning.  He previously completed a ten day 7/25 - 8/3/17 empiric course of meropenem / daptomycin / micafungin but has not been on any treatment antibiotics since then.  PO fluconazole 100 mg daily was added yesterday as a tacrolimus pharmacokinetic boost.  His peripheral WBC remains normal but increased to 9.4 this morning compared to the 5 - 6K range over the preceding week.  His serial serum CRPs were stable at 130 - 140.0 from 8/5 -  8/7/17.  He had some chest pain at ~ midnight last night and was transferred to  but was eventually ruled out for an MI.  He complains of malaise today.  He lacks other apparent new complaints today with the fevers, although continues to have abdominal tenderness on exam but no abdominal pain at rest.  He continues to be confused and is perhaps less oriented and interactive this midday than he was yesterday, but his mental status is not clearly different.  He remains intermittently restive.  He has a mild cough and is now on two liters of supplemental oxygen by nasal cannula for support but apparently does not need that with high O2 saturations on the O2 and lacks resting dyspnea.  He has no current chest pain, rhinorrhea, other EENT symptoms, new specific arthralgias, nausea, diarrhea, headache, or other new focal symptoms today.  The 8/4/17 repeat abdominal CT scan showed improvement in the right retroperitoneal lesion versus the 7/25/17 scan and no other likely new infectious foci, but increased amounts of ascites.  The 8/5/17 diagnostic paracentesis culture and fluid analysis results remain unremarkable to date.  The 8/3/17 blood CMV PCR viral load dropped to < 137 IU / ml on IV ganciclovir.  The [facility] nurse viewed his sacral decubitus ulcer on 8/7/17 and saw no evidence of focal infection at that site.    [facility] (copied from the 8/4/17 Transplant ID Note):   A 51 year old gentleman with PMH of DM, HTN, fibromyalgia, previous DVT with IVC filter,  s/p liver transplant secondary to ESLD due to CASTAÑEDA on 03/04/2017, CMV D+/R-, EBV D+/R-, who was admitted on 07/04 due to neutropenic enterocolitis starting empiric therapy with zosyn + flagyl + vancomycin + micafungin being transferred to the ICU, requiring exploratory laparotomy + wash out for neutropenic enterocolitis on 07/04, reporting inflamed cecum and ascending colon, having a second look on 07/05 finding improvement of the  inflammation and performing closure of abdominal incision; culture resulted S. Capitis considered a contaminant. Valcyte was stopped since admission. On 07/06, flagyl + vancomycin + micafungin were stopped and zosyn changed to ertapenem. As per ID note from 07/06, recommended to switch back ertapenem to zosyn since thrombocytopenia was seen before zosyn treatment, pancytopenia possibly related to medication (MMF, bactrim, valcyte, initially seen at the beginning of June) and recommended to monitor CMV PCR weekly ( on 07/03: positive < 137; before on 06/26 was ND). Additionally, on 07/12 a BAL was repeated and showed VRE/CONS/P putida/C. Albicans, all were considered a colonizers, and primary team continued with ertapenem, held MMF, bactrim, valcyte). On 07/13, it was recommended to re-start valcyte since his CMV PCR was 145 and counts improved. Patient persisted febrile, with evident ascitis tapped but culture resulted negative. Course complicated with thrombosis of the right arterial artery with ischemia to the tip of the ischemic finger. On 07/15, BAL was repeated and was positive for rhinovirus. Valcyte was re-initiated on 07/15 due to CMV PCR: 4225. PAtient was discharged from the ICU on 07/17. Also, 14 days of ertapenem were completed on 07/18 and EBV PCR was taken on 07/18 and resulted [id number] concerning of PTLD since patient persisted febrile with ascitis and colitis. On 07/20, ZAKIA was switched to GCV due to worsening level of 1906. Patient became encephalopathic but MRI resulted negative. On 07/21 a colonoscopy was performed and showed normal colonic mucosa; random biopsies were taken. On 07/25, patient deteriorated clinically presenting respiratory failure requiring intubation, so was transferred to ICU and started empiric treatment with meropenem + daptomycin + micafungin, and GCV dose was increased to 10 mg BID. A CT abdomen was repeated and showed retroperitoneal air so underwent EL + lysis of adhesions +  right hemicolectomy + ileostomy + partial omentectomy  due to ascending colitis (no neida perforation or ischemia). On 07/27 CMV PCR resulted 290. Patient persists with intermittent fever and encephalopathy.    Transplants:  3/4/2017 (Liver)    Review of Systems:  CONSTITUTIONAL:  New fever to 100.7 last night.  Previously, no fever, chills, or sweats 8/4 - 7/17.  Chronic fatigue and anorexia.  EYES: No eye pain, visual changes, or scleral icterus.  ENT:  No rhinorrhea, sinus pain, otalgia, hearing loss, tinnitus, sore throat, or oral pain.  Left ear lobe pressure ulcer.  LYMPH:  No edema.  RESPIRATORY:  No cough, increased sputum, or dyspnea (on intermittent low flow oxygen).  CARDIOVASCULAR:  No chest pain, palpitations.  GASTROINTESTINAL:  Improved chronic abdominal pain.  Significant ascites.  S/p liver transplant.  Dysphagia.  Liquid stool in his ileostomy.  No nausea, vomiting, or constipation.  GENITOURINARY:  Improved EJ.  Intermittent urinary incontinence.  No dysuria.  HEME:  Chronic anemia.  Improved neutropenia.  No easy bruising.  ENDOCRINE:  Type 2 diabetes mellitus on insulin.  MUSCULOSKELETAL:  Necrotic toe tips.  Generally deconditioned.  Coccygeal decub.  No new focal myalgias / arthralgias.  SKIN:  No rash or pruritus.  NEURO:  Confused, weak memory.  Wakefulness improved.  No headache.  PSYCH:  Negative.    Past Medical / Surgical History:  Past Medical History:   Diagnosis Date     Cancer (H)      Depressive disorder, not elsewhere classified      Esophageal reflux      Fibromyalgia 1/2009    dx with Dr Benitez( Rheum)     History of thrombophlebitis      Osteoarthritis      Other acute embolism veins 11/01    Deep vein thrombophlebitis, filter placed     Other and unspecified hyperlipidemia      Other chronic nonalcoholic liver disease     Fatty liver      Other testicular hypofunction      Pneumonia, organism 10-01    Included ARDS, sepsis, and  acute renal failure; hospitalized      Rheumatoid arthritis(714.0)      Type II or unspecified type diabetes mellitus without mention of complication, not stated as uncontrolled     Managed by endocrinology     Unspecified essential hypertension     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     Unspecified sleep apnea     Uses BiPAP     Past Surgical History:   Procedure Laterality Date     BENCH LIVER N/A 3/4/2017    Procedure: BENCH LIVER;  Surgeon: Jovan Tran MD;  Location: [facility] OR     C NONSPECIFIC PROCEDURE      tracheostomy     C NONSPECIFIC PROCEDURE      repair of deviated septum     C NONSPECIFIC PROCEDURE      Rt knee arthroscopy     C TOTAL KNEE ARTHROPLASTY  2008    Right knee arthroscopy     CHOLECYSTECTOMY       COLONOSCOPY N/A 2017    Procedure: COMBINED COLONOSCOPY, SINGLE OR MULTIPLE BIOPSY/POLYPECTOMY BY BIOPSY;  Colonoscopy;  Surgeon: Izaiah Montes MD;  Location: [facility] GI     ESOPHAGOSCOPY, GASTROSCOPY, DUODENOSCOPY (EGD), COMBINED N/A 2016    Procedure: COMBINED ESOPHAGOSCOPY, GASTROSCOPY, DUODENOSCOPY (EGD), BIOPSY SINGLE OR MULTIPLE;  Surgeon: Trent Pederson MD;  Location:  GI     LAPAROTOMY EXPLORATORY N/A 2017    Procedure: LAPAROTOMY EXPLORATORY;  Exploratory Laparotomy, washout;  Surgeon: Tip Zhang MD;  Location: [facility] OR     LAPAROTOMY EXPLORATORY N/A 2017    Procedure: LAPAROTOMY EXPLORATORY;  Exploratory Laparotomy, Washout with closure.;  Surgeon: Tip Zhang MD;  Location: [facility] OR     LAPAROTOMY EXPLORATORY N/A 2017    Procedure: LAPAROTOMY EXPLORATORY;  Exploratory Laparotomy, Right angelique-colectomy, end ileostomy, mucosal fistula, partial omentectomy;  Surgeon: Sara Dinh MD;  Location: [facility] OR     TRANSPLANT LIVER RECIPIENT  DONOR N/A 3/4/2017    Procedure: TRANSPLANT LIVER RECIPIENT  DONOR;  Surgeon: Jovan Tran MD;  Location: [facility] OR     Current Scheduled Medications:    insulin isophane human  13 Units  Subcutaneous QAM     sodium chloride 0.9%  1,000 mL Intravenous Once     diphenoxylate-atropine  5 mL Oral BID     fluconazole  100 mg Oral Daily     insulin glargine  24 Units Subcutaneous Q24H     tacrolimus  6 mg Oral BID IS     fludrocortisone  0.1 mg Oral Daily     sodium chloride (PF)  3 mL Intracatheter Q8H     ganciclovir (CYTOVENE) intermittent infusion  7.5 mg/kg Intravenous Q12H     sulfamethoxazole-trimethoprim  1 tablet Oral Daily     loperamide  4 mg Oral or Feeding Tube 4x Daily     insulin aspart  1-12 Units Subcutaneous Q4H     fiber modular  1 packet Per Feeding Tube TID     insulin aspart   Subcutaneous TID w/meals     protein modular  1 packet Per Feeding Tube TID     aspirin  80 mg Oral Daily     multivitamins with minerals  15 mL Per Feeding Tube Daily     heparin  5,000 Units Subcutaneous Q8H     pantoprazole  40 mg Oral or Feeding Tube Daily     sodium chloride (PF)  3 mL Intracatheter Q8H     Physical Examination:  Vital sign ranges over the past 24 hours:  Temp:  [98.2  F (36.8  C)-100.7  F (38.2  C)] 98.2  F (36.8  C)  Pulse:  [104-119] 104  Heart Rate:  [] 94  Resp:  [20-25] 20  BP: (121-169)/(69-96) 151/75  SpO2:  [93 %-100 %] 96 %    Intake/Output Summary (Last 24 hours) at 08/08/17 1242  Last data filed at 08/08/17 0757   Gross per 24 hour   Intake             1800 ml   Output             1225 ml   Net              575 ml     Physical Examination:  GENERAL:  Spontaneously awake, confused, restive, fatigued-appearing, WDWN 51 year old man in NAD semi-recumbent in bed.  EYES:  EOMI, PERRL, anicteric sclerae.  ENT:  No otorrhea.  NJ tube present.  Nasal cannula present.  No anterior oral lesion.  NECK:  Supple.  Right IJ CVC line site lacks inflammation.  LYMPH:  No cervical lymphadenopathy.  LUNGS:  Few bilaterally scattered coarse rhonchi.  CARDIOVASCULAR:  Regular rate and rhythm, tachycardia resolved, normal S1, S2, without murmur, gallop, or rub.  ABDOMEN:  Normal bowel sounds,  soft, slight generalized tenderness, mildly distended, midline stapled wound lacks inflammation with dressed superior mucous fistula, RLQ ileostomy with liquid stool.  :  No Crowe.  EXTREMITIES:  Distally warm, no edema,  ischemic right hallux and right second toe, also ischemic tip of left second toe, and right index, no ulcers.  Line site lacks inflammation.  NEUROLOGIC:  Awakable, oriented x 2, weak memory with confabulation, moves extremities x 4.    Inflammatory Markers    Recent Labs   Lab Test  08/07/17   0549  08/06/17   0633  08/05/17   0616  07/05/17   1810  03/05/17   0358  11/23/16   0813  11/16/16   0706  11/15/16   1039   08/15/11   1151  04/11/11   1209  01/03/11   1246  02/15/10   1232   SED   --    --    --    --    --   45*   --    --    --   11  14  17*  25*   CRP  130.0*  130.0*  140.0*  354.0  20.0*  58.0*  59.1*  49.8*   < >  11.1*  9.0*  11.7*  17.5*    < > = values in this interval not displayed.     Immune Globulin Studies     Recent Labs   Lab Test  07/24/17   0705  08/15/11   1243   IGG  1660*  1160   IGG1   --   734   IGG2   --   294   IGG3   --   7*   IGG4   --   59     Metabolic Studies       Recent Labs   Lab Test  08/08/17   0600  08/07/17   2234  08/07/17   0549  08/06/17   1707  08/06/17   0633  08/05/17   0616  08/04/17   1721   08/01/17   0651   07/31/17   1229   07/25/17   0945   07/06/17   0316   NA  136   --   139  140  141  139  141   < >  150*   < >   --    < >  137   < >  143   POTASSIUM  5.8*   --   5.4*  5.4*  5.4*  5.2  5.1   < >  4.3   < >   --    < >  4.0   < >  5.0   CHLORIDE  111*   --   114*  115*  114*  115*  113*   < >  123*   < >   --    < >  104   < >  116*   CO2  17*   --   18*  18*  19*  18*  19*   < >  20   < >   --    < >  26   < >  18*   ANIONGAP  7   --   8  6  8  5  8   < >  7   < >   --    < >  7   < >  9   BUN  55*   --   51*  49*  47*  47*  50*   < >  51*   < >   --    < >  77*   < >  62*   CR  1.18   --   1.06  1.01  0.95  0.90  0.88   < >  0.90   <  >   --    < >  2.60*   < >  2.75*   GFRESTIMATED  65   --   73  77  83  88  >90  Non  GFR Calc     < >  89   < >   --    < >  26*   < >  24*   GLC  152*   --   99  108*  98  92  102*   < >  141*   < >   --    < >  382*   < >  139*   A1C   --    --    --    --    --    --    --    --    --    --    --    --    --    --   Canceled, Test credited   Below Assay Range  NOTIFIED LEONARD ONEILL AT 0538 ON 7/6/17 BY EAANTOLIN     JACOB  8.2*   --   8.2*  7.9*  8.1*  8.0*  8.0*   < >  8.1*   < >   --    < >  7.6*   < >  6.9*   PHOS  4.6*   --   4.9*   --   4.7*  4.1   --    < >  3.1   < >   --    < >   --    < >  5.5*   MAG  2.0   --   2.1   --   2.2  2.3   --    < >  1.9   < >   --    < >   --    < >  2.1   LACT   --   0.8   --    --    --    --    --    --    --    --   1.0   < >   --    < >  1.5   CKT   --    --    --    --    --    --    --    --   16*   --    --    --   40   --    --     < > = values in this interval not displayed.     Hepatic Studies    Recent Labs   Lab Test  08/08/17   0600  08/02/17   0543  07/27/17   0410  07/25/17   1651  07/25/17   0945  07/25/17   0017  07/24/17   0705   07/11/17   0328   07/05/17   1810   BILITOTAL  0.5  0.4  1.3  0.6  0.5   --   0.4   < >  0.5   < >   --    ALKPHOS  172*  199*  143  242*  317*   --   264*   < >  158*   < >   --    ALBUMIN  2.1*  2.3*  2.4*  2.0*  1.9*   --   1.7*   < >  1.8*   < >   --    AST  28  62*  27  61*  90*   --   86*   < >  34   < >   --    ALT  25  42  27  50  60   --   54   < >  27   < >   --    LDH   --    --    --    --    --   258*   --    --    --    --   289*   GGT   --    --    --    --    --    --    --    --   58   --    --     < > = values in this interval not displayed.     Pancreatitis testing    Recent Labs   Lab Test  07/24/17   0705  07/17/17   0630  07/12/17   0342  07/10/17   0340  07/05/17   0346  03/05/17   0358  03/03/17   1458  02/21/17   1520  12/09/16   1159  02/08/16   1144   09/30/10   1734   AMYLASE   --    --    --     --    --   53  70   --    --    --    --   82   LIPASE   --    --    --    --    --   216   --   326  161   --    --   138   TRIG  303*  168*  114  177*  174*   --    --    --    --   99   < >   --     < > = values in this interval not displayed.     Hematology Studies      Recent Labs   Lab Test  08/08/17   0600  08/07/17   0549  08/06/17   1207  08/06/17   0633  08/06/17   0111  08/05/17   0616  08/04/17   0548  08/03/17   0533  08/02/17   0543   WBC  9.4  5.5   --   5.2   --   6.3  6.9  5.1  5.0   ANEU   --   3.7   --   3.5   --   3.9  4.1  2.8  2.7   ALYM   --   1.5   --   1.5   --   2.2  2.5  1.9  2.0   ZINA   --   0.2   --   0.2   --   0.2  0.3  0.3  0.2   AEOS   --   0.1   --   0.0   --   0.0  0.0  0.0  0.1   HGB  8.1*  7.7*  8.3*  7.6*  7.9*  6.9*  7.8*  7.3*  7.6*   HCT  24.5*  23.7*   --   23.2*   --   20.9*  24.3*  23.5*  24.4*   PLT  110*  116*   --   94*   --   94*  99*  92*  90*     Arterial Blood Gas Testing    Recent Labs   Lab Test  08/02/17   1216  07/26/17   2243  07/26/17   2133  07/26/17   2046  07/26/17   2017  07/25/17   1746  07/25/17   1037   PH  7.37  Incorrect specimen type  NOTTIED BY WOJCIECH DEWITT NEW ORDER TO REPLACE.7/26/17 AT 2346 BY ZAINAB.  CORRECTED ON 07/26 AT 2350: PREVIOUSLY REPORTED AS 7.27     --    --   Canceled, Test credited   Incorrect specimen type    7.32*  7.14*   PCO2  31*  Incorrect specimen type  NOTTIED BY YASHIRA SHARMA ORDER TO REPLACE.7/26/17 AT 2346 BY ZAINAB.  CORRECTED ON 07/26 AT 2350: PREVIOUSLY REPORTED AS 45     --    --   Canceled, Test credited   Incorrect specimen type    42  69*   PO2  69*  Incorrect specimen type  NOTTIED BY WOJCIECH DEWITT, NEW ORDER TO REPLACE.7/26/17 AT 2346 BY ZAINAB.  CORRECTED ON 07/26 AT 2350: PREVIOUSLY REPORTED AS 53     --    --   Canceled, Test credited   Incorrect specimen type    81  103   HCO3  18*  Incorrect specimen type  NOTTIED BY WOJCIECH DEWITT, NEW ORDER TO REPLACE.7/26/17 AT 2346 BY ZANIAB.  CORRECTED ON 07/26 AT 2350:  PREVIOUSLY REPORTED AS 20     --    --   Canceled, Test credited   Incorrect specimen type    22  23   O2PER  21  Incorrect specimen type  NOTTIED BY WOJCIECH DEWITT, NEW ORDER TO REPLACE.7/26/17 AT 2346 BY ZAINAB.  CORRECTED ON 07/26 AT 2350: PREVIOUSLY REPORTED AS 40    40  62  100  100.0  100  40.0  7L     Urine Studies     Recent Labs   Lab Test  08/05/17   0617  07/28/17   1042  07/25/17   1205  07/19/17   1150  07/11/17   1105   URINEPH  5.0  5.0  5.0  5.0  5.0   NITRITE  Negative  Negative  Negative  Negative  Negative   LEUKEST  Negative  Negative  Trace*  Negative  Negative   WBCU  5*  6*  5*  2  <1     Vancomycin Levels     Recent Labs   Lab Test  07/06/17   0316  10/28/16   1405   VANCOMYCIN  22.1  14.4       Component Value Flag Ref Range Units Status Collected Lab      Procalcitonin 0.78   ng/ml Final 08/02/2017  5:28 PM 51     Microbiology:  Ascites  8/5 cx aerobic / anaerobic / fungal cxs NGTD.  Gram stain:  NOS, few WBCs, moderate RBCs.     Fluid analysis:  Yellow, slightly cloudy,  (91%N, 6% L, 3% M), albumin 1.6, protein 3.8.    7/26 (OR) negative.   Gram stain:  NOS, few WBCs.    7/25 negative.  Gram stain:  NOS, no WBCs.    7/5 S capitis    BAL   7/15 yeast and Rhinovirus     7/12 single colony of VRE, C albicans/dubliniensis   Sputum  7/29 Normal elvie, heavy C albicans    7/11 VRE and Coag Neg Staph     Bcxs:    8/4 x 2, 8/2 x 2, 7/31 x 2, 7/28 x 2, 7/25 x 2, 7/15 NGTD / negative    Ur cxs  8/5, 7/25 negative    8/5 Serum CRAG negative    CMV viral loads    Recent Labs   Lab Test  08/03/17   0533  07/27/17   1109  07/20/17   1427  07/18/17   0530  07/15/17   1553   CSPEC  EDTA PLASMA  EDTA PLASMA  CORRECTED ON 07/28 AT 0840: PREVIOUSLY REPORTED AS Blood    Plasma  Plasma  Bronchoalveolar Lavage   CMVLOG  <2.1  2.5*  3.3*  3.0*  3.6*     CMV viral loads    Log IU/mL of CMVQNT   Date Value Ref Range Status   08/03/2017 <2.1 <2.1 [Log_IU]/mL Final   07/27/2017 2.5 (H) <2.1 [Log_IU]/mL Final    07/20/2017 3.3 (H) <2.1 [Log_IU]/mL Final   07/18/2017 3.0 (H) <2.1 [Log_IU]/mL Final   07/15/2017 3.6 (H) <2.1 [Log_IU]/mL Final   07/10/2017 2.2 (H) <2.1 [Log_IU]/mL Final   07/03/2017 <2.1 <2.1 [Log_IU]/mL Final   06/26/2017 Not Calculated <2.1 [Log_IU]/mL Final     CMV resistance testing    EBV DNA Copies/mL   Date Value Ref Range Status   08/01/2017 6864 (A) EBVNEG [Copies]/mL Final   07/18/2017 760116 (A) EBVNEG [Copies]/mL Final     Pathology:   Colectomy path 7/26/17  - Colonic wall with perforation, edema, and acute serositis   - Background colonic mucosa with no significant histologic abnormality   - CMV immunostain is positive in rare (3) cells   - Terminal ileum with no significant histologic abnormality   - Viable, unremarkable surgical margins   - One benign lymph node (0/1)   - Appendix with fibrous obliteration of the tip     Colon biopsies 7/21/2017   A: Colon biopsy, ascending   B: Colon biopsy, descending   FINAL DIAGNOSIS:   A. COLON BIOPSY, ASCENDING:   - Colonic mucosa with no significant histologic abnormality   - Negative for intraepithelial lymphocytosis or deposition of   subepithelial thick collagenous band   B. COLON BIOPSY, DESCENDING:   - Crypt architecture distortion, consistent with healed prior injury   - Negative for active inflammation or dysplasia   - Negative for intraepithelial lymphocytosis or deposition of   subepithelial thick collagenous band     Cytology of ascitic fluid 7/25/2017   PERITONEAL FLUID, ASCITES:   -Negative for malignancy   Cytology of ascitic fluid 7/14/2017.   Peritoneal fluid, ascites:   - Negative for malignancy   - Acute and chronic inflammation present   Specimen Adequacy: Satisfactory for evaluation.   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   INTERPRETATION:   Ascites fluid:        Rare to absent viable B cells     COMMENT:   This specimen was collected on 7/14/17 but was not ordered/delivered to   lab/run until 7/18/17 - results should be  interpreted with caution due   to low cellularity/viability.     Due to limited cellularity we were only able to analyze the B cells.   There is no immunophenotypic evidence of B cell non-Hodgkin lymphoma.   Neoplastic cells, including large cells, may not survive specimen   processing. This sample may not be representative. Final interpretation   requires correlation with morphologic and clinical features.     Imagin/8 CXR pending   Ultrasound-guided paracentesis performed by IR:  250 ccyellow-brown fluid sent for analysis.   Abdm CT:  Heterogenous area of right-sided retroperitoneal gas has mildly decreased in size (versus 17 scan).  Moderate to severe ascites with diffuse peritoneal enhancement.  This may represent diffuse infection, for example bacterial peritonitis.  Mild residual foci of free air in the abdomen is likely postsurgical.  Diffuse small bowel and colonic wall thickening, similar to prior, likely reactive to ascites/peritonitis.  Findings of portal hypertension including splenomegaly, ascites, esophageal and upper abdominal varices.  Small right pleural effusion with associated atelectasis. Improved left pleural effusion and basilar consolidation.   CXR:  Decreased lung volumes with improving perihilar and bibasilar opacities, which may represent atelectasis, pulmonary edema, or infection.   CXR: Decreased lung volumes with mildly increased perihilar and bibasilar opacities, which may represent pulmonary edema, atelectasis, or infection.  Small right pleural effusion.   BLE U/S:  Right leg: Normal RONAL. Mildly Abnormal TBI, suggestive of small vessel disease. Patent right lower extremity arteries without evidence of hemodynamically significant stenosis.  Left leg: Normal RONAL. Abnormal TBI, suggestive of small vessel disease. Patent left lower extremity arteries without evidence of hemodynamically significant stenosis.   CXR:  Improving perihilar and bibasilar opacities  likely represent infection/aspiration, atelectasis, or pulmonary edema.  Moderate opacities bilaterally persist.  Stable small to moderate pleural effusions bilaterally.  7/28 CXR:  Interval removal of the endotracheal tube.  Increased perihilar opacities, worsening infection versus pulmonary edema.  Increased bilateral moderate pleural effusions with overlying atelectasis/consolidation.  7/25 Chest / abdm CT:  New large heterogeneous area of right-sided retroperitoneal gas which is highly concerning for an infectious process.  Given the history of prior neutropenic colitis and biopsies, there could also be a component of stool from a ruptured viscus, despite the lack of  surrounding bowel loops and no extraluminal oral contrast. Surgical consultation is recommended. Bibasilar atelectasis versus consolidation with small bilateral pleural effusions.  Extensive mesenteric and soft tissue edema with large volume ascites. Numerous mesenteric and retroperitoneal lymph nodes which are presumably reactive.  Postsurgical changes of liver transplant.  7/20 Brain MRI:  Significant image degradation due to motion artifact, with no definite acute intracranial pathology. No abnormal contrast enhancing intracranial lesions.  Mucosal thickening in the sphenoid and maxillary sinuses and left greater than right mastoid fluid are nonspecific in the setting of recent intubation.  7/13 Chest / abdm CT:  Improved moderate right-sided pleural effusion and resolution of left-sided pleural effusion. There remain large areas of atelectasis/consolidation in both lungs, including in the left lower lobe despite resolution of left-sided pleural effusion. Superimposed infectious process cannot be excluded.  Moderate-large amount of ascites increased from 7/8/2017, which makes it difficult to evaluate for the presence or absence of inflammatory change or infectious process in the abdomen or pelvis. No intra-abdominal abscess.  Stable small  presumed hematoma within the ventral abdominal wall fat.  Splenomegaly.

## 2017-08-08 NOTE — PLAN OF CARE
Problem: Goal Outcome Summary  Goal: Goal Outcome Summary  Transfer  Transferred from: 7A  Via:bed  Reason for transfer:Pt appropriate for 6B- worsened patient condition - ?acute MI.    Family: Aware of transfer  Belongings: Received with pt  Chart: Received with pt  Medications: Meds received from old unit with pt  2 RN Skin Assessment Completed By: WOJCIECH Elizabeth and WOJCIECH Diaz   Report received from: WOJCIECH Webb

## 2017-08-08 NOTE — PROGRESS NOTES
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  Now s/p ex lap with R hemicolectomy, end ileostomy, mucus fistula, partial omenectomy on 7/26.    Increased hyperglycemia  Yesterday with pt declining insulin.  Still running higher today with NPH resumed at lower dose.    Plan:  -transplant contacted pt's outpatient endocrinologist Dr. Eckert of [facility]-- no issue with inpatient team managing pt's glucose  - NPH reduced to 13 units qAM (pt refused yesterday)  -glargine 24 units qPM  -meal aspart 1unit/7g CHO ordered for meals and snacks  -aspart high correction q4h   -Please run D10 at nutritional support rate (~75ml/h) if TFs are interrupted (prn order)    Please notify diabetes team of changes planned for nutrition support schedule.     Outpatient diabetes follow up: Dr. Eckert at [facility]  Plan reviewed with pt and bedside nurse and primary team             Interval History:   The last 24 hours progress and nursing notes reviewed.   Pt transferred to  last night r/t chest pain.  Concern today for infection and transplant note indicates possible plans for imaging tomorrow.  Camacho is disoriented-  Significantly  More so than yesterday.  Yesterday refusing insulin, declining diabetes consult.  Today, he has no opionion.   Nepro at 60 cc/h continuous continues.         Recent Labs  Lab 08/08/17  1120 08/08/17  0908 08/08/17  0600 08/08/17  0421 08/08/17  0133 08/07/17  2005 08/07/17  1645  08/07/17  0549  08/06/17  1707  08/06/17  0633  08/05/17  0616  08/04/17  1721   GLC  --   --  152*  --   --   --   --   --  99  --  108*  --  98  --  92   --  102*   * 196*  --  152* 137* 192* 163*  < >  --   < >  --   < >  --   < >  --   < >  --    < > = values in this interval not displayed.            Review of Systems:   See interval hx          Medications:   PTA taking Januvia and glargine versus glargine and aspart per carb    Active Diet Order      Dysphagia Diet Level 3 Advanced Thin Liquids (water, ice chips, juice, milk gelatin, ice cream, etc)     Physical Exam:  Gen:  Resting in bed, NAD.   HEENT: NC/AT,  NJ feeding tube  Resp: unlabored at rest  Neuro: disoriented    /76 (BP Location: Left arm)  Pulse 104  Temp 98.6  F (37  C) (Axillary)  Resp 24  Ht 1.829 m (6')  Wt 92.6 kg (204 lb 1.6 oz)  SpO2 94%  BMI 27.68 kg/m2           Data:     Lab Results   Component Value Date    A1C  07/06/2017     Canceled, Test credited   Below Assay Range  NOTIFIED LEONARD ONEILL AT 0538 ON 7/6/17 BY CHRISSY      A1C 9.4 02/16/2017    A1C 6.2 12/14/2016    A1C 6.1 10/27/2016    A1C 8.3 07/02/2012            Recent Labs   Lab Test  08/08/17   0600  08/07/17   0549   NA  136  139   POTASSIUM  5.8*  5.4*   CHLORIDE  111*  114*   CO2  17*  18*   ANIONGAP  7  8   GLC  152*  99   BUN  55*  51*   CR  1.18  1.06   JACOB  8.2*  8.2*     CBC RESULTS:   Recent Labs   Lab Test  08/08/17   0600   WBC  9.4   RBC  2.77*   HGB  8.1*   HCT  24.5*   MCV  88   MCH  29.2   MCHC  33.1   RDW  17.8*   PLT  110*     Janice Guzman APRN [phone]    Diabetes Management job code [id number]

## 2017-08-08 NOTE — PROVIDER NOTIFICATION
-------------------CRITICAL LAB VALUE-------------------    Lab Value: 6.1 K  Time of notification: 6:04 PM  MD notified: Paged SHILPI emyser @ 0079 no response, notified surgery cross cover @1800  Patient status:  Temp:  [98.2  F (36.8  C)-100.7  F (38.2  C)] 98.6  F (37  C)  Pulse:  [104-119] 104  Heart Rate:  [] 99  Resp:  [20-25] 24  BP: (121-169)/(69-96) 131/76  SpO2:  [93 %-100 %] 94 %  Orders received: ekg 12 lead

## 2017-08-08 NOTE — PLAN OF CARE
"Problem: Goal Outcome Summary  Goal: Goal Outcome Summary  Outcome: Declining  Temp:  [98.2  F (36.8  C)-100.7  F (38.2  C)] 98.6  F (37  C)  Pulse:  [104-119] 104  Heart Rate:  [] 99  Resp:  [20-25] 24  BP: (121-169)/(69-96) 131/76  SpO2:  [93 %-100 %] 94 %   Shift 4433-7469  Neuro: alert. Disoriented x4, illogical/incoherent speech at times. Towards end of shift pt is able to say 'I don't feel good.\"  But is unable to clarify what that means.   Cardiac: NSR/ST.  Respiratory:  2-3L NC  GI/: dark green/brown output per ileostomy. Straight cath x1 300cc urine sent to lab. Zofran x2 given (pt having hiccups/burping seems to be trying to have emesis).   Diet/appetite:  TF @60. 50Q1 h20 flushes per NJ.   Activity: restless in bed, repositioned with assistance.   Pain: moaning frequently, md aware. Oxy given x1.   Skin: no new deficits. Mucous fistula with moist dressing. Sacral wound dressing CDI.      6.1 potassium. EKG completed. 1L bolus started and lasix given. K cocktail ordered, awaiting med from pharmacy.       Continue with POC. Notify primary team with changes.      "

## 2017-08-08 NOTE — PLAN OF CARE
Problem: Goal Outcome Summary  Goal: Goal Outcome Summary  Outcome: No Change  Neuro: Oriented to self only. Delirium persists. Very restless today.   Cardiac: SR/ST VSS. TMAX 100.7  Respiratory: Sating 95% on NC 3L.   GI/: Incontinent X1. Loose stool via Ileostomy, appliance intact.   Diet/appetite: DD3 diet, no appetite. NJ with TF @60ml/hr.   Activity:  Up with Lift. up to chair.  Pain: C/O Abdominal pain. Oxycodone 10mg X2.   Skin: Drsg changed to coccyx. Incision ROC to abdomen. Mucus fistula covered with moist gauze.     Pt restless throughout shift, orientation fluctuating. Requested Zyprexa or haldol from NP Vasser. Fever improved. K+ recheck at 3pm,. NS 1L bolus given. 1V CXR done. Need UA, due to void. Plan for CT abdomen/pelvis tomorrow.   R: Continue with POC. Notify primary team with changes.

## 2017-08-08 NOTE — PROGRESS NOTES
Cross-cover note:    08/08/2017    General Surgery Cross Cover Note    Pt was complaining of chest pain around 2030. EKG was done which showed ST changes in anterior leads suspicious for infarct. VSS. Aspirin, nitro, morphine, and O2 was given. Pt is a poor historian and was difficult to assess pain quality or serial exams. RRT was called, pt placed on telemetry, and cardiology was consulted.     Previous EKG in July showed potential ST changes in anterior leads with no further workup done at that time.     Given uncertainty of an infarct, pt was transferred to  for continuous cardiac monitoring. He continued to show no acute changes in clinical, hemodynamic, or respiratory status.     Repeat EKG an hour later showed non-specific ST changes in anterior leads more consistent with July EKG. Troponin was negative at 0.018.     B/P: 149/84, T: 100.2, P: 110, R: 22    PE:  A&O x1, NAD  Breathing non-labored  Abd soft, non-tender, non-distended  Extr. Warm to touch    Plan:  Given lack of definitive ST changes on EKG and negative troponin, likely not a true infarct. Cardiology in agreement.    Will continue to monitor  Repeat trop in 6 hours to trend  No further workup at this time  May transfer back to  in AM    Prabhjot Rivas MD  PGY-1 Surgery  (6AM-5PM weekdays please page primary team, nights/wknds/holidays, page job code [phone])

## 2017-08-08 NOTE — PROVIDER NOTIFICATION
"   08/07/17 2100   Call Information   Date of Call 08/07/17   Time of Call 2120   Name of person requesting the team Jon   Title of person requesting team RN   RRT Arrival time 2123   Time RRT ended 2220   Reason for call   Type of RRT Adult   Primary reason for call Cardiovascular;Pain   Cardiovascular EKG changes  (chest pain)   Pain New   Pain Location \"all over\" Had c/o chest pain prior to arrival.    Was patient transferred from the ED, ICU, or PACU within last 24 hours prior to RRT call? No   SBAR   Situation Pt c/o chest pain, EKG showed possible infarct.    Background Liver transplant March 2017 2/2 to ESLD from [place] admitted to [facility] for abdominal pain and fever   Notable History/Conditions Recent surgery;Transplant;Diabetes   Assessment Pt hypertensive but stable. Pt c/o pain \"all over\". Pt confused which is his baseline.    Interventions ECG;Meds;O2 per N/C or mask;Labs  (Subligual nitro given x2, prn oxycodone. Troponin and Lactic)   Patient Outcome   Patient Outcome Transferred to  (6B)   RRT Team   Date Attending Physician notified 08/07/17   Time Attending Physician notified 2030   Physician(s) Dr. Prabhjot Rivas   Lead RN Inna Webb   RT Colt   Other staff Chauncey Petit   Post RRT Intervention Assessment   Post RRT Assessment Stable/Improved   Date Follow Up Done 08/08/17   Time Follow Up Done 0015     "

## 2017-08-08 NOTE — PLAN OF CARE
Problem: Goal Outcome Summary  Goal: Goal Outcome Summary  Outcome: No Change  Neuro: A&Ox.   Cardiac: ST. VSS.       Respiratory: SpO2  Upper 90s on 2L NC.  GI/: urine incontinence x2, BM through ostomy with liquid brown output.  Diet/appetite: TF running at goal of 60 mL/hr.   Activity:  Assist of 2, up to chair and in halls.  Pain: At acceptable level on current regimen.   Skin: Intact, no new deficits noted.        R: Continue with POC. Notify primary team with changes.

## 2017-08-08 NOTE — PLAN OF CARE
Problem: Goal Outcome Summary  Goal: Goal Outcome Summary  OT: 6B: Pt max A x2 supine <> sidelying and unable to sit EOB 2/2 pain and pt's very strong posterior lean when OT attempting to A pt to sit EOB w/ max Ax2. Pt total A sling transfer to chair w/ chair alarm on and legs elevated when OT left the room,  VSS throughout.   Rec: TCU to increase ind in ADLS/IADLS

## 2017-08-08 NOTE — PROVIDER NOTIFICATION
Notified CHARLES Gutierres of:    -Neuro status is disoriented x4, pt does not verbalize and does not follow commands. Is alert and moaning in bed. Unsafe to try oral intake at this time. She will re- dose prograf for liquid suspension.     -Would a lactic acid lab be indicated as pt's procal is 3.11 and disorientation, none needed at this time.

## 2017-08-08 NOTE — PROVIDER NOTIFICATION
Spoke with marnie Gutierres. Pt more disoriented today, disoriented X4 at times. Requested PRN zyprexa if pt becomes restless. Per MD pt has known delirium. Monitor close for sepsis symptoms. And Per Nenita Try to give Diflucan and Prograf PO if pt stable enough to swallow. Will continue to monitor.

## 2017-08-08 NOTE — PLAN OF CARE
Problem: Goal Outcome Summary  Goal: Goal Outcome Summary  SLP: Pt seen bedside for dysphagia f/u.  Pt notably confused and lethargic this date.  Session limited to oral cares and trials of thin liquids due to increased risk for aspiration related to confusion and lethargy.  No overt s/s of aspiration observed on thin liquid trials.  Recommend cautiously continue dysphagia diet level 3 and thin liquids.  Pt will need to be fully alert and upright for intake; otherwise, hold PO.  Pt should take small bites/sips, pace self, and alternate consistencies.  ST to follow as indicated on POC.

## 2017-08-09 ENCOUNTER — APPOINTMENT (OUTPATIENT)
Dept: CT IMAGING | Facility: CLINIC | Age: 52
End: 2017-08-09
Attending: RADIOLOGY PRACTITIONER ASSISTANT
Payer: COMMERCIAL

## 2017-08-09 ENCOUNTER — APPOINTMENT (OUTPATIENT)
Dept: CT IMAGING | Facility: CLINIC | Age: 52
End: 2017-08-09
Attending: NURSE PRACTITIONER
Payer: COMMERCIAL

## 2017-08-09 LAB
ANION GAP SERPL CALCULATED.3IONS-SCNC: 6 MMOL/L (ref 3–14)
ANION GAP SERPL CALCULATED.3IONS-SCNC: 7 MMOL/L (ref 3–14)
BACTERIA SPEC CULT: NORMAL
BUN SERPL-MCNC: 56 MG/DL (ref 7–30)
BUN SERPL-MCNC: 58 MG/DL (ref 7–30)
CALCIUM SERPL-MCNC: 8.3 MG/DL (ref 8.5–10.1)
CALCIUM SERPL-MCNC: 8.6 MG/DL (ref 8.5–10.1)
CHLORIDE SERPL-SCNC: 110 MMOL/L (ref 94–109)
CHLORIDE SERPL-SCNC: 111 MMOL/L (ref 94–109)
CO2 SERPL-SCNC: 19 MMOL/L (ref 20–32)
CO2 SERPL-SCNC: 20 MMOL/L (ref 20–32)
CREAT SERPL-MCNC: 1.26 MG/DL (ref 0.66–1.25)
CREAT SERPL-MCNC: 1.47 MG/DL (ref 0.66–1.25)
CRP SERPL-MCNC: 190 MG/L (ref 0–8)
ERYTHROCYTE [DISTWIDTH] IN BLOOD BY AUTOMATED COUNT: 18.5 % (ref 10–15)
FUNGUS SPEC CULT: ABNORMAL
GFR SERPL CREATININE-BSD FRML MDRD: 50 ML/MIN/1.7M2
GFR SERPL CREATININE-BSD FRML MDRD: 60 ML/MIN/1.7M2
GLUCOSE BLDC GLUCOMTR-MCNC: 144 MG/DL (ref 70–99)
GLUCOSE BLDC GLUCOMTR-MCNC: 159 MG/DL (ref 70–99)
GLUCOSE BLDC GLUCOMTR-MCNC: 161 MG/DL (ref 70–99)
GLUCOSE BLDC GLUCOMTR-MCNC: 164 MG/DL (ref 70–99)
GLUCOSE BLDC GLUCOMTR-MCNC: 165 MG/DL (ref 70–99)
GLUCOSE BLDC GLUCOMTR-MCNC: 167 MG/DL (ref 70–99)
GLUCOSE BLDC GLUCOMTR-MCNC: 171 MG/DL (ref 70–99)
GLUCOSE BLDC GLUCOMTR-MCNC: 175 MG/DL (ref 70–99)
GLUCOSE BLDC GLUCOMTR-MCNC: 180 MG/DL (ref 70–99)
GLUCOSE BLDC GLUCOMTR-MCNC: 181 MG/DL (ref 70–99)
GLUCOSE BLDC GLUCOMTR-MCNC: 182 MG/DL (ref 70–99)
GLUCOSE BLDC GLUCOMTR-MCNC: 187 MG/DL (ref 70–99)
GLUCOSE BLDC GLUCOMTR-MCNC: 210 MG/DL (ref 70–99)
GLUCOSE BLDC GLUCOMTR-MCNC: 220 MG/DL (ref 70–99)
GLUCOSE BLDC GLUCOMTR-MCNC: 221 MG/DL (ref 70–99)
GLUCOSE BLDC GLUCOMTR-MCNC: 226 MG/DL (ref 70–99)
GLUCOSE BLDC GLUCOMTR-MCNC: 232 MG/DL (ref 70–99)
GLUCOSE SERPL-MCNC: 176 MG/DL (ref 70–99)
GLUCOSE SERPL-MCNC: 187 MG/DL (ref 70–99)
GRAM STN SPEC: NORMAL
HCT VFR BLD AUTO: 23.3 % (ref 40–53)
HGB BLD-MCNC: 7.6 G/DL (ref 13.3–17.7)
HGB BLD-MCNC: 7.7 G/DL (ref 13.3–17.7)
INTERPRETATION ECG - MUSE: NORMAL
LACTATE BLD-SCNC: 0.9 MMOL/L (ref 0.7–2.1)
MAGNESIUM SERPL-MCNC: 2.1 MG/DL (ref 1.6–2.3)
MCH RBC QN AUTO: 29.5 PG (ref 26.5–33)
MCHC RBC AUTO-ENTMCNC: 33 G/DL (ref 31.5–36.5)
MCV RBC AUTO: 89 FL (ref 78–100)
MICRO REPORT STATUS: ABNORMAL
MICRO REPORT STATUS: NORMAL
MICRO REPORT STATUS: NORMAL
PHOSPHATE SERPL-MCNC: 5.2 MG/DL (ref 2.5–4.5)
PLATELET # BLD AUTO: 122 10E9/L (ref 150–450)
POTASSIUM SERPL-SCNC: 5.7 MMOL/L (ref 3.4–5.3)
POTASSIUM SERPL-SCNC: 5.9 MMOL/L (ref 3.4–5.3)
PROCALCITONIN SERPL-MCNC: 7.55 NG/ML
RADIOLOGIST FLAGS: ABNORMAL
RBC # BLD AUTO: 2.61 10E12/L (ref 4.4–5.9)
SODIUM SERPL-SCNC: 136 MMOL/L (ref 133–144)
SODIUM SERPL-SCNC: 138 MMOL/L (ref 133–144)
SPECIMEN SOURCE: ABNORMAL
SPECIMEN SOURCE: NORMAL
SPECIMEN SOURCE: NORMAL
TACROLIMUS BLD-MCNC: ABNORMAL UG/L (ref 5–15)
TME LAST DOSE: ABNORMAL H
WBC # BLD AUTO: 8.8 10E9/L (ref 4–11)

## 2017-08-09 PROCEDURE — 36592 COLLECT BLOOD FROM PICC: CPT | Performed by: STUDENT IN AN ORGANIZED HEALTH CARE EDUCATION/TRAINING PROGRAM

## 2017-08-09 PROCEDURE — 25000125 ZZHC RX 250

## 2017-08-09 PROCEDURE — P9041 ALBUMIN (HUMAN),5%, 50ML: HCPCS | Performed by: STUDENT IN AN ORGANIZED HEALTH CARE EDUCATION/TRAINING PROGRAM

## 2017-08-09 PROCEDURE — 27210738 ZZH ACCESSORY CR2

## 2017-08-09 PROCEDURE — 85018 HEMOGLOBIN: CPT | Performed by: NURSE PRACTITIONER

## 2017-08-09 PROCEDURE — 84100 ASSAY OF PHOSPHORUS: CPT | Performed by: STUDENT IN AN ORGANIZED HEALTH CARE EDUCATION/TRAINING PROGRAM

## 2017-08-09 PROCEDURE — 25000128 H RX IP 250 OP 636: Performed by: STUDENT IN AN ORGANIZED HEALTH CARE EDUCATION/TRAINING PROGRAM

## 2017-08-09 PROCEDURE — 84132 ASSAY OF SERUM POTASSIUM: CPT | Performed by: STUDENT IN AN ORGANIZED HEALTH CARE EDUCATION/TRAINING PROGRAM

## 2017-08-09 PROCEDURE — 80048 BASIC METABOLIC PNL TOTAL CA: CPT | Performed by: NURSE PRACTITIONER

## 2017-08-09 PROCEDURE — 25000125 ZZHC RX 250: Performed by: PHYSICIAN ASSISTANT

## 2017-08-09 PROCEDURE — 87076 CULTURE ANAEROBE IDENT EACH: CPT | Performed by: NURSE PRACTITIONER

## 2017-08-09 PROCEDURE — 74176 CT ABD & PELVIS W/O CONTRAST: CPT

## 2017-08-09 PROCEDURE — 86140 C-REACTIVE PROTEIN: CPT | Performed by: STUDENT IN AN ORGANIZED HEALTH CARE EDUCATION/TRAINING PROGRAM

## 2017-08-09 PROCEDURE — 80197 ASSAY OF TACROLIMUS: CPT | Performed by: STUDENT IN AN ORGANIZED HEALTH CARE EDUCATION/TRAINING PROGRAM

## 2017-08-09 PROCEDURE — 25000128 H RX IP 250 OP 636

## 2017-08-09 PROCEDURE — 36592 COLLECT BLOOD FROM PICC: CPT | Performed by: TRANSPLANT SURGERY

## 2017-08-09 PROCEDURE — 25000132 ZZH RX MED GY IP 250 OP 250 PS 637: Performed by: NURSE PRACTITIONER

## 2017-08-09 PROCEDURE — 0W9930Z DRAINAGE OF RIGHT PLEURAL CAVITY WITH DRAINAGE DEVICE, PERCUTANEOUS APPROACH: ICD-10-PCS | Performed by: RADIOLOGY

## 2017-08-09 PROCEDURE — 25000132 ZZH RX MED GY IP 250 OP 250 PS 637: Performed by: TRANSPLANT SURGERY

## 2017-08-09 PROCEDURE — 25000128 H RX IP 250 OP 636: Performed by: PHYSICIAN ASSISTANT

## 2017-08-09 PROCEDURE — 25000132 ZZH RX MED GY IP 250 OP 250 PS 637: Performed by: SURGERY

## 2017-08-09 PROCEDURE — 87040 BLOOD CULTURE FOR BACTERIA: CPT | Performed by: NURSE PRACTITIONER

## 2017-08-09 PROCEDURE — 80048 BASIC METABOLIC PNL TOTAL CA: CPT | Performed by: STUDENT IN AN ORGANIZED HEALTH CARE EDUCATION/TRAINING PROGRAM

## 2017-08-09 PROCEDURE — 84145 PROCALCITONIN (PCT): CPT | Performed by: STUDENT IN AN ORGANIZED HEALTH CARE EDUCATION/TRAINING PROGRAM

## 2017-08-09 PROCEDURE — 25000132 ZZH RX MED GY IP 250 OP 250 PS 637: Performed by: STUDENT IN AN ORGANIZED HEALTH CARE EDUCATION/TRAINING PROGRAM

## 2017-08-09 PROCEDURE — 36415 COLL VENOUS BLD VENIPUNCTURE: CPT | Performed by: NURSE PRACTITIONER

## 2017-08-09 PROCEDURE — 00000146 ZZHCL STATISTIC GLUCOSE BY METER IP

## 2017-08-09 PROCEDURE — 84132 ASSAY OF SERUM POTASSIUM: CPT | Performed by: TRANSPLANT SURGERY

## 2017-08-09 PROCEDURE — 25000131 ZZH RX MED GY IP 250 OP 636 PS 637: Performed by: NURSE PRACTITIONER

## 2017-08-09 PROCEDURE — 83735 ASSAY OF MAGNESIUM: CPT | Performed by: STUDENT IN AN ORGANIZED HEALTH CARE EDUCATION/TRAINING PROGRAM

## 2017-08-09 PROCEDURE — 85027 COMPLETE CBC AUTOMATED: CPT | Performed by: STUDENT IN AN ORGANIZED HEALTH CARE EDUCATION/TRAINING PROGRAM

## 2017-08-09 PROCEDURE — 25000131 ZZH RX MED GY IP 250 OP 636 PS 637

## 2017-08-09 PROCEDURE — 27211039 CT RETROPERITONEAL ABSCESS DRAIN W CATH PLACE

## 2017-08-09 PROCEDURE — C1769 GUIDE WIRE: HCPCS

## 2017-08-09 PROCEDURE — 99152 MOD SED SAME PHYS/QHP 5/>YRS: CPT

## 2017-08-09 PROCEDURE — 12000024 ZZH R&B TRANSPLANT CRITICAL

## 2017-08-09 PROCEDURE — 87070 CULTURE OTHR SPECIMN AEROBIC: CPT | Performed by: NURSE PRACTITIONER

## 2017-08-09 PROCEDURE — 25000128 H RX IP 250 OP 636: Performed by: NURSE PRACTITIONER

## 2017-08-09 PROCEDURE — 83605 ASSAY OF LACTIC ACID: CPT | Performed by: STUDENT IN AN ORGANIZED HEALTH CARE EDUCATION/TRAINING PROGRAM

## 2017-08-09 PROCEDURE — 25800025 ZZH RX 258: Performed by: STUDENT IN AN ORGANIZED HEALTH CARE EDUCATION/TRAINING PROGRAM

## 2017-08-09 PROCEDURE — 49406 IMAGE CATH FLUID PERI/RETRO: CPT

## 2017-08-09 PROCEDURE — 27210913 ZZH NUTRITION PRODUCT INTERMEDIATE PACKET

## 2017-08-09 PROCEDURE — 87205 SMEAR GRAM STAIN: CPT | Performed by: NURSE PRACTITIONER

## 2017-08-09 PROCEDURE — 25000132 ZZH RX MED GY IP 250 OP 250 PS 637: Performed by: PHYSICIAN ASSISTANT

## 2017-08-09 PROCEDURE — C1729 CATH, DRAINAGE: HCPCS

## 2017-08-09 PROCEDURE — 99153 MOD SED SAME PHYS/QHP EA: CPT

## 2017-08-09 PROCEDURE — 87075 CULTR BACTERIA EXCEPT BLOOD: CPT | Performed by: NURSE PRACTITIONER

## 2017-08-09 PROCEDURE — 27210807 ZZH SHEATH CR6

## 2017-08-09 PROCEDURE — 25800025 ZZH RX 258

## 2017-08-09 RX ORDER — FLUDROCORTISONE ACETATE 0.1 MG/1
0.1 TABLET ORAL 2 TIMES DAILY
Status: DISCONTINUED | OUTPATIENT
Start: 2017-08-09 | End: 2017-08-17

## 2017-08-09 RX ORDER — HEPARIN SODIUM 5000 [USP'U]/.5ML
5000 INJECTION, SOLUTION INTRAVENOUS; SUBCUTANEOUS EVERY 8 HOURS
Status: DISCONTINUED | OUTPATIENT
Start: 2017-08-10 | End: 2017-08-10

## 2017-08-09 RX ORDER — FENTANYL CITRATE 50 UG/ML
25-50 INJECTION, SOLUTION INTRAMUSCULAR; INTRAVENOUS EVERY 5 MIN PRN
Status: DISCONTINUED | OUTPATIENT
Start: 2017-08-09 | End: 2017-08-09 | Stop reason: HOSPADM

## 2017-08-09 RX ORDER — OXYCODONE HCL 5 MG/5 ML
5 SOLUTION, ORAL ORAL
Status: DISCONTINUED | OUTPATIENT
Start: 2017-08-09 | End: 2017-08-22

## 2017-08-09 RX ORDER — FLUMAZENIL 0.1 MG/ML
0.2 INJECTION, SOLUTION INTRAVENOUS
Status: DISCONTINUED | OUTPATIENT
Start: 2017-08-09 | End: 2017-08-09 | Stop reason: HOSPADM

## 2017-08-09 RX ORDER — NALOXONE HYDROCHLORIDE 0.4 MG/ML
.1-.4 INJECTION, SOLUTION INTRAMUSCULAR; INTRAVENOUS; SUBCUTANEOUS
Status: DISCONTINUED | OUTPATIENT
Start: 2017-08-09 | End: 2017-08-09 | Stop reason: HOSPADM

## 2017-08-09 RX ORDER — PIPERACILLIN SODIUM, TAZOBACTAM SODIUM 3; .375 G/15ML; G/15ML
3.38 INJECTION, POWDER, LYOPHILIZED, FOR SOLUTION INTRAVENOUS EVERY 6 HOURS
Status: DISCONTINUED | OUTPATIENT
Start: 2017-08-09 | End: 2017-08-22

## 2017-08-09 RX ORDER — ALBUMIN, HUMAN INJ 5% 5 %
25 SOLUTION INTRAVENOUS ONCE
Status: COMPLETED | OUTPATIENT
Start: 2017-08-09 | End: 2017-08-09

## 2017-08-09 RX ORDER — LINEZOLID 2 MG/ML
600 INJECTION, SOLUTION INTRAVENOUS EVERY 12 HOURS
Status: DISCONTINUED | OUTPATIENT
Start: 2017-08-09 | End: 2017-08-15

## 2017-08-09 RX ADMIN — Medication 80 MG: at 08:27

## 2017-08-09 RX ADMIN — ACETAMINOPHEN 650 MG: 325 SOLUTION ORAL at 21:01

## 2017-08-09 RX ADMIN — PIPERACILLIN AND TAZOBACTAM 3.38 G: 3; .375 INJECTION, POWDER, LYOPHILIZED, FOR SOLUTION INTRAVENOUS; PARENTERAL at 09:56

## 2017-08-09 RX ADMIN — LOPERAMIDE HYDROCHLORIDE 4 MG: 2 SOLUTION ORAL at 16:03

## 2017-08-09 RX ADMIN — Medication 1 PACKET: at 08:29

## 2017-08-09 RX ADMIN — MIDAZOLAM 4 MG: 1 INJECTION INTRAMUSCULAR; INTRAVENOUS at 13:32

## 2017-08-09 RX ADMIN — HEPARIN SODIUM 5000 UNITS: 5000 INJECTION, SOLUTION INTRAVENOUS; SUBCUTANEOUS at 00:08

## 2017-08-09 RX ADMIN — TACROLIMUS 8 MG: 5 CAPSULE ORAL at 08:28

## 2017-08-09 RX ADMIN — CALCIUM GLUCONATE 25 ML/HR: 94 INJECTION, SOLUTION INTRAVENOUS at 14:15

## 2017-08-09 RX ADMIN — Medication 1 PACKET: at 21:04

## 2017-08-09 RX ADMIN — Medication 5 ML: at 08:27

## 2017-08-09 RX ADMIN — Medication 1 PACKET: at 18:09

## 2017-08-09 RX ADMIN — LINEZOLID 600 MG: 600 INJECTION, SOLUTION INTRAVENOUS at 10:42

## 2017-08-09 RX ADMIN — Medication 1 PACKET: at 14:17

## 2017-08-09 RX ADMIN — HEPARIN SODIUM 5000 UNITS: 5000 INJECTION, SOLUTION INTRAVENOUS; SUBCUTANEOUS at 08:28

## 2017-08-09 RX ADMIN — Medication 1 PACKET: at 11:06

## 2017-08-09 RX ADMIN — Medication 5 ML: at 20:15

## 2017-08-09 RX ADMIN — PIPERACILLIN AND TAZOBACTAM 3.38 G: 3; .375 INJECTION, POWDER, LYOPHILIZED, FOR SOLUTION INTRAVENOUS; PARENTERAL at 16:03

## 2017-08-09 RX ADMIN — Medication 250 MG: at 08:28

## 2017-08-09 RX ADMIN — Medication 1 PACKET: at 20:16

## 2017-08-09 RX ADMIN — Medication 220 MG: at 08:28

## 2017-08-09 RX ADMIN — FLUDROCORTISONE ACETATE 0.1 MG: 0.1 TABLET ORAL at 20:16

## 2017-08-09 RX ADMIN — LOPERAMIDE HYDROCHLORIDE 4 MG: 2 SOLUTION ORAL at 08:28

## 2017-08-09 RX ADMIN — FENTANYL CITRATE 300 MCG: 50 INJECTION, SOLUTION INTRAMUSCULAR; INTRAVENOUS at 13:31

## 2017-08-09 RX ADMIN — LOPERAMIDE HYDROCHLORIDE 4 MG: 2 SOLUTION ORAL at 20:15

## 2017-08-09 RX ADMIN — FLUDROCORTISONE ACETATE 0.1 MG: 0.1 TABLET ORAL at 08:28

## 2017-08-09 RX ADMIN — GANCICLOVIR SODIUM 650 MG: 500 INJECTION, POWDER, LYOPHILIZED, FOR SOLUTION INTRAVENOUS at 11:49

## 2017-08-09 RX ADMIN — PIPERACILLIN AND TAZOBACTAM 3.38 G: 3; .375 INJECTION, POWDER, LYOPHILIZED, FOR SOLUTION INTRAVENOUS; PARENTERAL at 21:50

## 2017-08-09 RX ADMIN — TACROLIMUS 138 MCG/HR: 5 INJECTION, SOLUTION INTRAVENOUS at 18:16

## 2017-08-09 RX ADMIN — Medication 1 PACKET: at 14:18

## 2017-08-09 RX ADMIN — LINEZOLID 600 MG: 600 INJECTION, SOLUTION INTRAVENOUS at 23:17

## 2017-08-09 RX ADMIN — OXYCODONE HYDROCHLORIDE 10 MG: 5 SOLUTION ORAL at 02:44

## 2017-08-09 RX ADMIN — LIDOCAINE HYDROCHLORIDE 20 ML: 10 INJECTION, SOLUTION EPIDURAL; INFILTRATION; INTRACAUDAL; PERINEURAL at 13:32

## 2017-08-09 RX ADMIN — PANTOPRAZOLE SODIUM 40 MG: 40 TABLET, DELAYED RELEASE ORAL at 08:28

## 2017-08-09 RX ADMIN — Medication 5 ML: at 08:28

## 2017-08-09 RX ADMIN — LOPERAMIDE HYDROCHLORIDE 4 MG: 2 SOLUTION ORAL at 11:47

## 2017-08-09 RX ADMIN — DEXTROSE MONOHYDRATE: 100 INJECTION, SOLUTION INTRAVENOUS at 16:20

## 2017-08-09 RX ADMIN — ALBUMIN (HUMAN) 25 G: 12.5 SOLUTION INTRAVENOUS at 09:19

## 2017-08-09 NOTE — PLAN OF CARE
Problem: Goal Outcome Summary  Goal: Goal Outcome Summary  Transfer  Transferred to: 7A  Via:bed  Reason for transfer:Pt no longer appropriate for 6B- improved patient condition  Family: Aware of transfer  Belongings: Packed and sent with pt  Chart: Delivered with pt to next unit  Medications: Meds received from old unit with pt  Report given to: previous RN gave report to 7A RN Jon  Pt status: stable

## 2017-08-09 NOTE — PROVIDER NOTIFICATION
@0030 Notified oncall surgery/transplant MD of pt Potassium recheck @midnight was 5.9, down from 6.1; pt still has K cocktail running @25ml/hr; no orders given at this time. Will continue to monitor and continue POC/update team as needed.

## 2017-08-09 NOTE — PLAN OF CARE
Problem: Goal Outcome Summary  Goal: Goal Outcome Summary  OT/7A: Cancel -  Pt on bedrest this pm following IR procedure.

## 2017-08-09 NOTE — BRIEF OP NOTE
Interventional Radiology Brief Post Procedure Note    Procedure: Retroperitoneal drain placement CT guided    Proceduralist: Momo Cullen MD    Assistant: Trent Acuna MD and None    Time Out: Prior to the start of the procedure and with procedural staff participation, I verbally confirmed the patient s identity using two indicators, relevant allergies, that the procedure was appropriate and matched the consent or emergent situation, and that the correct equipment/implants were available. Immediately prior to starting the procedure I conducted the Time Out with the procedural staff and re-confirmed the patient s name, procedure, and site/side. (The Joint Commission universal protocol was followed.)  Yes        Sedation: IR Nurse Monitored Care   Post Procedure Summary:  Prior to the start of the procedure and with procedural staff participation, I verbally confirmed the patient s identity using two indicators, relevant allergies, that the procedure was appropriate and matched the consent or emergent situation, and that the correct equipment/implants were available. Immediately prior to starting the procedure I conducted the Time Out with the procedural staff and re-confirmed the patient s name, procedure, and site/side. (The Joint Commission universal protocol was followed.)  Yes       Sedatives: Fentanyl and Midazolam (Versed)    Vital signs, airway and pulse oximetry were monitored and remained stable throughout the procedure and sedation was maintained until the procedure was complete.  The patient was monitored by staff until sedation discharge criteria were met.    Patient tolerance: Patient tolerated the procedure well with no immediate complications.    Time of sedation in minutes: 60 Minutes minutes from beginning to end of physician one to one monitoring.          Findings: Successful placement of 24Fr Thal Quik retroperitoneal drain    Estimated Blood Loss: Minimal    Fluoroscopy Time:   minute(s)    SPECIMENS: Fluid and/or tissue for Gram stain and culture    Complications: 1. None     Condition: Stable    Plan: Patient to return to floor for continued cares. Catheter to gravity drain. Flush 3 times daily with 10cc saline.    Comments: See dictated procedure note for full details.    Trent Acuna MD

## 2017-08-09 NOTE — PLAN OF CARE
Problem: Goal Outcome Summary  Goal: Goal Outcome Summary  PT-7A: Cancel.  Pt on bedrest this pm following IR procedure. Pt re-scheduled for 8/10.

## 2017-08-09 NOTE — PLAN OF CARE
Problem: Goal Outcome Summary  Goal: Goal Outcome Summary     [id number] Tmax 100.4; BPs 140s-150s/70s, HR 110s, OVSS on 2-3L via NC (pt desatted in his sleep briefly). On VPM and bed exit armed d/t impulsive behavior. Pt continues to be disoriented x4, mumbling with incoherent speech. Pt incontinent of urine x2 this shift, ileostomy patent with 150ml of liquid, brown stool. Mucous fistula with moist dressing (changed during the night d/t pt pulling/scratching off previous dressing). Pt turned q2hrs--sacral wound with mepelex dressing. Unable to assess if pt n/v; PRN oxy given x1 d/t pt moaning and continued restlessness; after administration pt was able to sleep for a few hours. Continuous TF @60ml/hr with 50ml water flushes qhr through nasoduodenal tube. Pt is to have abdominal/pelvic CT today--confirmed verbally with Evette Pennington NP that TF was okay to continue and did not need to be held for CT. K cocktail still infusing via RIJ for high potassium yesterday--see previous note, recheck with morning labs. Hourly BG taken d/t cocktail; BG ranging from 159-182, SS coverage given for midnight and 4am checks. IV prograf infusing @4ml/hr via RIJ with NS TKO. On telemetry for high potassium--NSR. Pt turning in bed with 2 assist though very reluctant and unwilling (pt will yell out and resist). Pt resting quietly now, will continue to monitor and continue POC/update team as needed.

## 2017-08-09 NOTE — PROGRESS NOTES
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  Now s/p ex lap with R hemicolectomy, end ileostomy, mucus fistula, partial omenectomy on 7/26.    Glucose stable in mid to higher 100s, with uncertain degree of contribution from K-cocktail.    Plan:    -NPH  13 units qAM   -glargine 24 units qPM  -meal aspart 1unit/7g CHO ordered for meals and snacks (not taking any currently)  -aspart high correction q4h   -Please run D10 at nutritional support rate (~75ml/h) if TFs are interrupted (prn order)    Please notify diabetes team of changes planned for nutrition support schedule.     Outpatient diabetes follow up: Dr. Eckert at [facility]  Plan reviewed with pt and bedside nurse and primary team    ADDM (1630)-  TFs stopped d/t concern for perforated bowel.  Glargine reduced to 16 units for tonight, so D10W does NOT have to be infused overnight (communicated to RN and primary team).  If pt has significant hyperglycemia overnight, recommend start IV insulin drip.             Interval History:   8/7/17 Pt upset about inpatient diabetes mgmt consult.  Transplant contacted pt's outpatient endocrinologist Dr. Eckert of [facility]-- no issue with inpatient team managing pt's glucose    The last 24 hours progress and nursing notes reviewed.   Pt transferred back to .  On K-cocktail since last evening (thus getting frequent BG monitoring).  Creatinine rising.   Nepro at 60 cc/h continuous is ongoing.      Recent Labs  Lab 08/09/17  1000 08/09/17  0823 08/09/17  0711 08/09/17  0656 08/09/17  0618 08/09/17  0511 08/09/17  0429  08/08/17  0600  08/07/17  0549   08/06/17  1707  08/06/17  0633  08/05/17  0616   GLC  --   --  187*  --   --   --   --   --  152*  --  99  --  108*  --  98  --  92   * 181*  --  182* 165* 180* 175*  < >  --   < >  --   < >  --   < >  --   < >  --    < > = values in this interval not displayed.            Review of Systems:   See interval hx          Medications:   PTA taking Januvia and glargine versus glargine and aspart per carb    Active Diet Order      Combination Diet 3 gm K Diet; Dysphagia Diet Level 3: Advanced; Thin Liquids (water, ice chips, juice, milk gelatin, ice cream, etc)     Physical Exam:  Gen:  Resting in bed, NAD. Slightly restless  HEENT: NC/AT,  NJ feeding tube  Resp: unlabored at rest  Neuro: disoriented    /73 (BP Location: Right arm)  Pulse 110  Temp 99  F (37.2  C) (Oral)  Resp 20  Ht 1.829 m (6')  Wt 92.6 kg (204 lb 1.6 oz)  SpO2 95%  BMI 27.68 kg/m2           Data:     Lab Results   Component Value Date    A1C  07/06/2017     Canceled, Test credited   Below Assay Range  NOTIFIED LEONARD ONEILL AT 0538 ON 7/6/17 BY CHRISSY      A1C 9.4 02/16/2017    A1C 6.2 12/14/2016    A1C 6.1 10/27/2016    A1C 8.3 07/02/2012            Recent Labs   Lab Test  08/09/17   0711  08/09/17   0021   08/08/17   0600   NA  136   --    --   136   POTASSIUM  5.7*  5.9*   < >  5.8*   CHLORIDE  111*   --    --   111*   CO2  19*   --    --   17*   ANIONGAP  6   --    --   7   GLC  187*   --    --   152*   BUN  56*   --    --   55*   CR  1.26*   --    --   1.18   JACOB  8.3*   --    --   8.2*    < > = values in this interval not displayed.     CBC RESULTS:   Recent Labs   Lab Test  08/09/17   0711   WBC  8.8   RBC  2.61*   HGB  7.7*   HCT  23.3*   MCV  89   MCH  29.5   MCHC  33.0   RDW  18.5*   PLT  122*     Janice Guzman APRN [facility] [phone]    Diabetes Management job code [id number]

## 2017-08-09 NOTE — PROGRESS NOTES
Patient becoming septic (increasing tachycardia, fevers overnight, AMS, rising Cr) with concerning gas/fluid collection right retroperitoneum on CT.  Attempts made to contact wife for consent for drain placement, unable to contact her.  Team feels that this is an emergent procedure.    Evette Pennington NP

## 2017-08-09 NOTE — PROGRESS NOTES
Interventional Radiology Intra-procedural Nursing Note    Patient Name: Camacho Bhagat  Medical Record Number: [id number]  Today's Date: August 9, 2017    Sedation start: 1245  Procedure start time: 1250  End of procedure time: 1350  Procedure: CT guided abdominal drain placement  Staff: Dr. Cullen  Report given to:  RN  Other Notes:  Pt brought to CT 2 from . Consent obtained via telephone from wife. Pt appears somewhat confused and restless. Pt complains of abdominal pain constantly. Fentanyl 300 mcg and Versed 4 mg givne for sedation. Labs sent as ordered. Pt tolerated procedure without any noted complications. Pt transferred back to .     Annalisa Irwin RN

## 2017-08-09 NOTE — PROGRESS NOTES
Immunosuppression Note:    Camacho Bhagat is a 51 year old male who is seen today  for immunosuppression management     I, Jovan Tran MD, I have examined the patient with the resident/PA/Fellow, discussed and agree with the note and findings.  I have reviewed today's vital signs, medications, labs and imaging. I reviewed the immunosuppression medications and levels. 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 Concern for CMV colitis with low count CMV viremia/GI PTLD and subsequently underwent colonoscopy on 7/21, random biopsies obtained.  On 7/25/2017 patient had respiratory distress, abdominal compartment syndrome and EJ with oliguria. Broad spectrum antibiotics were started.  He was intubated and had a paracentesis with 5L removed. He did not required pressors.  CT was obtained which showed a new large heterogeneous right sided retroperitoneal gas and air tracking along duodenum.  No contrast extravasation.  No intraperitoneal air noted. Colorectal surgery was consulted. Initial conservative management with bowel rest and IV abx. Pt continued to spike fevers despite IV abx.  Now s/p Exploratory laparotomy, right angelique-colectomy, end ileostomy, mucus fistula, partial omentectomy on 7/26.    Graft Function: Good function. LFTs acceptable. US liver unremarkable.  Immunosuppression Management:   CellCept discontinued (6/27/17) due to neutropenia.   Prograf goal ~8 due to single IS. Level < 3. Added fluconazole  booster 8/7 and tacro gtt 8/9.  Cardiorespiratory: Stable respiratory status, NLB on RA  Hematology: Pancytopenia on admission, acute on chronic, secondary to medications (MMF, bactrim, valcyte). Improved with holding medications and neupogen. 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  Continue to hold MMF and bactrim.   -Anemia- Hemoglobin stable. WBC stable. Last blood transfusion on 7/26.   GI:   -Neutropenic colitis on admission. Colonoscopy 7/21. Biopsy: resolving injury secondary to possible drug induced vs infectious.   -Bowel perforation: 7/25 CT scan abd/pelvis, po contrast, showed a new large heterogeneous right sided retroperitoneal gas and air tracking along duodenum.  No contrast extravasation.  No intraperitoneal air noted. Colorectal surgery consulted.  Continued to spike high grade temperature despite IV antibiotics therefore patient taken to OR. s/p Exploratory laparotomy, right angelique-colectomy, end ileostomy, mucus fistula, partial omentectomy on 7/26. Findings no neida perforation, phlegmon/inflammationright colon. Colon pathology suggested colon perforation, negative for malignancy; CMV stain positive.    -Diet:  NPO due to IR drain. TF at goal via NJ tube.   -High output  ileostomy: Goal ileostomy output ~ 1000 cc in 24 hrs.   Loperamide 2mg q6H, fiber TID, Lomotil BID 5 mg.  Fluid/Electrolytes/Renal:   -Dehydration:  500ml albumin this morning for tachycardia, concern for sepsis.  -EJ: on admission, likely sec to high intraabdominal pressures and ischemic ATN sec to sepsis. Improved, but now Cr increasing again.  Concern for sepsis.  -Hypernatremia: resolved with free water.    -Hyperkalemia: on florinef, increase today.  Remains on K cocktail.  Lasix was ineffective overnight.  Avoid kayexalate due to recent bowel perforation.  Endocrine: DM type 2. Endocrine consulting for glycemic management.  On lantus/NPH.  BG trending up, concerning for  infection.  Infectious disease:  ID following.  Tmax 100.7F.  WBC 9.4->8.8 (above baseline).  -Fever:  Procalcitonin increasing.  CT C/A/P today showed a persistent gas/fluid collection RLQ (final read not yet available).  IR placed a drain, awaiting gram stain and culture.  UA not suggestive of UTI, blood cultures ordered.  Concern for evolving sepsis due to increasing Cr, tachycardia, fever, AMS, increasing blood glucose.  Will start empiric linezolid and zosyn.  -CMV viremia: Primary CMV infection.  CMV colitis per pathology, peak serum 7/15 4225 IU/ml.  Continue IV ganciclovir. Follow CMV PCR weekly, negative as of 8/3.  -EBV viremia:  Peak serum 406,697 copies/ml on 7/18.  Down to 6864 as of 8/1.  Check weekly.  -R/o SBP or abscess:  8/5 paracentesis with 250ml removed.  , cx negative.    Resolved issues:  -Severe sepsis: On admission, secondary to right colon phlegmon. Meropenem/daptomycin/micafungin tx x 10 days completed on 8/3. S/p right angelique-colectomy.  Path: CMV stain +, perforation of colon noted.  7/5 Fluid culture + staph capitis broth only (contamination).   Neuro: Toxic metabolic encephalopathy secondary to infection, prolonged hospital stay.  Now delirious.  Vascular:  Some evidence of microvascular injury in digits (toes) this is most likely due to injury while patient was on pressor therapy with sepsis.  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   Activity: Deconditioned due to prolong hospital course. Daily PT/OT, encourage pt to be OOB to chair. Encourage pulmonary toilet.   Prophylaxis: DVT-Heparin SQ  Disposition: 7A.       Medical Decision Making: Medium    PILLO/Fellow/Resident Provider:  Evette Pennington NP [id number]    Faculty: Jovan Tran MD    __________________________________________________________________  Transplant History:.  3/4/2017 DD OLT with Dr. Tran (Liver), Postoperative day: 158     Interval History:   Overnight events:  Confused.   Increased pain in RLQ today.    ROS:   A 10-point review of systems was negative except as noted above.    Meds:    linezolid  600 mg Intravenous Q12H     piperacillin-tazobactam  3.375 g Intravenous Q6H     sodium chloride (PF)  10 mL Irrigation Q8H     [START ON 8/10/2017] heparin  5,000 Units Subcutaneous Q8H     insulin isophane human  13 Units Subcutaneous QAM     multivitamin CF formula  5 mL Per Feeding Tube Daily     vitamin C  250 mg Per Feeding Tube Daily     zinc sulfate  220 mg Per Feeding Tube Daily     fiber modular  1 packet Per Feeding Tube 5x Daily     tacrolimus  8 mg Oral BID IS     diphenoxylate-atropine  5 mL Oral BID     insulin glargine  24 Units Subcutaneous Q24H     fludrocortisone  0.1 mg Oral Daily     sodium chloride (PF)  3 mL Intracatheter Q8H     ganciclovir (CYTOVENE) intermittent infusion  7.5 mg/kg Intravenous Q12H     sulfamethoxazole-trimethoprim  1 tablet Oral Daily     loperamide  4 mg Oral or Feeding Tube 4x Daily     insulin aspart  1-12 Units Subcutaneous Q4H     insulin aspart   Subcutaneous TID w/meals     protein modular  1 packet Per Feeding Tube TID     aspirin  80 mg Oral Daily     pantoprazole  40 mg Oral or Feeding Tube Daily     sodium chloride (PF)  3 mL Intracatheter Q8H       Physical Exam:     Admit Weight: 94.2 kg (207 lb 11.2 oz) (SCALE 2)    Current vitals:   /73 (BP Location: Right arm)  Pulse 101  Temp 98.6  F (37  C) (Oral)  Resp 20  Ht 1.829 m (6')  Wt 92.6 kg (204 lb 1.6 oz)  SpO2 98%  BMI 27.68 kg/m2    Vital sign ranges:    Temp:  [98.4  F (36.9  C)-100.4  F (38  C)] 98.6  F (37  C)  Pulse:  [101-112] 101  Heart Rate:  [] 112  Resp:  [12-24] 20  BP: ()/(59-79) 152/73  SpO2:  [93 %-98 %] 98 %  Patient Vitals for the past 24 hrs:   BP Temp Temp src Pulse Heart Rate Resp SpO2   08/09/17 1430 152/73 - - 101 - 20 98 %   08/09/17 1350 120/62 - - 104 - 12 97 %   08/09/17 1335 (!) 83/59 - - 102 - 12 97 %   08/09/17 1325 - - - 101 - 12  96 %   08/09/17 1315 94/61 - - 102 - 12 96 %   08/09/17 1300 - - - - - - 96 %   08/09/17 1250 107/63 - - 105 - 12 96 %   08/09/17 1245 131/79 - - 107 - 12 96 %   08/09/17 1139 152/78 98.6  F (37  C) Oral 101 - 20 98 %   08/09/17 0818 129/73 99  F (37.2  C) Oral 110 - 20 95 %   08/09/17 0425 149/76 98.7  F (37.1  C) Oral - 112 22 93 %   08/09/17 0013 150/79 100.4  F (38  C) Oral 112 114 21 94 %   08/08/17 2030 141/71 100.3  F (37.9  C) Oral - 107 22 94 %   08/08/17 1926 151/79 98.4  F (36.9  C) Oral - 107 22 96 %   08/08/17 1539 131/76 98.6  F (37  C) Axillary - 99 24 94 %     General Appearance: NAD  Skin: normal, warm, dry  Heart: sinus tach 100-110  Lungs: productive cough, diminished bilateral  Abdomen: soft, non distended, tender right flank. Ileostomy with brown output. Mucus fistula covered. Midline incision, c/d/i.   : No vazquez.   Extremities: No edema.  Necrotic blackened areas on right 1st & 2nd toes and left 2nd toe.  Neurologic: A&O x 2, waxes and wanes.       Data:   CMP    Recent Labs  Lab 08/09/17  1155 08/09/17  0711  08/08/17  0600   NA  --  136  --  136   POTASSIUM 5.7* 5.7*  < > 5.8*   CHLORIDE  --  111*  --  111*   CO2  --  19*  --  17*   GLC  --  187*  --  152*   BUN  --  56*  --  55*   CR  --  1.26*  --  1.18   GFRESTIMATED  --  60*  --  65   GFRESTBLACK  --  72  --  78   JACOB  --  8.3*  --  8.2*   MAG  --  2.1  --  2.0   PHOS  --  5.2*  --  4.6*   ALBUMIN  --   --   --  2.1*   BILITOTAL  --   --   --  0.5   ALKPHOS  --   --   --  172*   AST  --   --   --  28   ALT  --   --   --  25   < > = values in this interval not displayed.  CBC    Recent Labs  Lab 08/09/17  0711 08/08/17  0600   HGB 7.7* 8.1*   WBC 8.8 9.4   * 110*     COAGS    Recent Labs  Lab 08/05/17  0949   INR 1.19*      Urinalysis  Recent Labs   Lab Test  08/08/17   1600  08/05/17   0617   06/14/17   1508   04/11/16   1345   COLOR  Yellow  Yellow   < >   --    < >  Yellow   APPEARANCE  Slightly Cloudy  Slightly Cloudy   < >    "--    < >  Clear   URINEGLC  Negative  Negative   < >   --    < >  30*   URINEBILI  Negative  Negative   < >   --    < >  Negative   URINEKETONE  Negative  Negative   < >   --    < >  Negative   SG  1.010  1.018   < >   --    < >  1.016   UBLD  Negative  Negative   < >   --    < >  Small*   URINEPH  5.0  5.0   < >   --    < >  5.0   PROTEIN  30*  30*   < >   --    < >  30*   NITRITE  Negative  Negative   < >   --    < >  Negative   LEUKEST  Negative  Negative   < >   --    < >  Negative   RBCU  3*  0   < >   --    < >  1   WBCU  7*  5*   < >   --    < >  1   UTPG   --    --    --   1.55*   --   0.41*    < > = values in this interval not displayed.          7/21/2017   SPECIMEN(S):   A: Colon biopsy, ascending   B: Colon biopsy, descending     FINAL DIAGNOSIS:   A. COLON BIOPSY, ASCENDING:   - Colonic mucosa with no significant histologic abnormality   - Negative for intraepithelial lymphocytosis or deposition of   subepithelial thick collagenous band     B. COLON BIOPSY, DESCENDING:   - Crypt architecture distortion, consistent with healed prior injury   - Negative for active inflammation or dysplasia   - Negative for intraepithelial lymphocytosis or deposition of   subepithelial thick collagenous band   - Please, see comment     COMMENT:   Specimen B, \"descending colon\" features lamina propria edema, slight   variation in crypt sizes and shapes and occasional crypt drop-out.  This   may indicate a resolving injury which could be drug-induced or   infectious.     CT scan 7/25/2017:   IMPRESSION:   1. New large heterogeneous area of right-sided retroperitoneal gas  which is highly concerning for an infectious process. Given the  history of prior neutropenic colitis and biopsies, there could also be  a component of stool from a ruptured viscus, despite the lack of  surrounding bowel loops and no extraluminal oral contrast. Surgical  consultation is recommended.      2. Bibasilar atelectasis versus consolidation with " small bilateral  pleural effusions.     3. Extensive mesenteric and soft tissue edema with large volume  ascites. Numerous mesenteric and retroperitoneal lymph nodes which are  presumably reactive.     4. Postsurgical changes of liver transplant.     [Urgent Result: Retroperitoneal gas]     Finding was identified on 7/25/2017 5:34 PM.      Dr. Santoyo was contacted by Dr. Atkins at 7/25/2017 5:37 PM and  verbalized understanding of the urgent finding.      I have personally reviewed the examination and initial interpretation  and I agree with the findings.     LUCI HOROWITZ, [facility]

## 2017-08-09 NOTE — PROGRESS NOTES
Interventional Radiology Pre-Procedure Sedation Assessment   Time of Assessment: 12:26 PM    Expected Level: Moderate Sedation    Indication: Sedation is required for the following type of Procedure: Drain    Sedation and procedural consent: Risks, benefits and alternatives were discussed with Patient and Spouse    PO Intake: Appropriately NPO for procedure    ASA Class: Class 3 - SEVERE SYSTEMIC DISEASE, DEFINITE FUNCTIONAL LIMITATIONS.    Mallampati: Grade 2:  Soft palate, base of uvula, tonsillar pillars, and portion of posterior pharyngeal wall visible    Lungs: Lungs Clear with good breath sounds bilaterally    Heart: Normal heart sounds and rate    History and physical reviewed and no updates needed. I have reviewed the lab findings, diagnostic data, medications, and the plan for sedation. I have determined this patient to be an appropriate candidate for the planned sedation and procedure and have reassessed the patient IMMEDIATELY PRIOR to sedation and procedure.    Asad Rogers PA-C

## 2017-08-09 NOTE — CONSULTS
Patient is on IR schedule 8/9/17  for a retroperitoneal drain placement  Patient recieved 5000u heparin at  8 am, discussed Risk with team pg [phone].  Mr Bhagat is presenting septic surgery requesting us to proceed    Orders for NPO, scrubs and antibiotics have been entered.   Consent will be done prior to procedure.   Please contact the IR charge RN at [phone] for estimated time of procedure.     Discussed with Dr. Jewel Sosa IR RPA  [phone] Call pager  [phone] pager

## 2017-08-09 NOTE — PLAN OF CARE
Problem: Individualization  Goal: Patient Preferences     Tmax 99, HR tachy, other VSS, continues on 2 liters nasal cannula, saturating mid 90s%  Patient intermittently moaning with generalized pain, sometimes reporting no pain (no pain intervention given).  Blood etdrcljb=233, 210, 232, 221, 220, 226  (okay to check q2hrs per Evette Pennington CNP).  Patient continues to be confused, has VPM, and bed alarm in place.  Attempted to turn patient q2 hours; however, patient resistant and often times returns self to supine position.  Mucous fistula dressing and sacral dressing remain intact (sacral dressing due to be changed tomorrow). Incontinent of urine, ileostomy with moderate output, NPO, TF off per order.  Patient had abdominal and chest CT with contrast this AM (see results). Drain placed in IR, remains to gravity, and irrigated per order.  Patient received albumin this AM for tachycardia and concern for sepsis.  Prograf gtt increased and currently infusing 6.9ml/hr (138mcg/hr).  K cocktail remains at 25ml/hr.   Scheduled meds given as ordered.  Continue to monitor, treat as ordered, notify team with changes.  Awaiting BMP/Hgb/K recheck at 1800.

## 2017-08-09 NOTE — PLAN OF CARE
Problem: Goal Outcome Summary  Goal: Goal Outcome Summary  Outcome: No Change  Patient transferred up to unit 7A from  at 2230 this evening. Patient continues to be hypertensive and tachy at times. T-max 100.3. Patient received oxycodone x1 for pain. He denies nausea. He is on a dysphagia level 3 diet with a poor diet. He is voiding spontaneously and adequate amounts. Incontinent at times. Ileostomy is patent and draining. NJ tube continues with tube feeds at 60cc/hr and 50 cc hourly water flushes. He continues on VPM. He continues to be confused and impulsive at times. He continues to moan and make incoherent fraises. Patient was started on a k-cocktail drip this evening. It was decreased to 25cc/hr at 2200. He has been on hourly blood sugars since the drip was started. Blood sugars have been 144-156. Tacrolimus drip continues at 4cc/hr. Plan is for the k-cocktail to be discontinued at midnight and have a potassium recheck done 4-6 hours after. Will continue to monitor and update the team as needed.

## 2017-08-09 NOTE — PLAN OF CARE
Problem: Goal Outcome Summary  Goal: Goal Outcome Summary  SLP: Dysphagia therapy cancelled.  RN cares and CT scan being completed this AM.  ST to f/u tomorrow.

## 2017-08-10 ENCOUNTER — APPOINTMENT (OUTPATIENT)
Dept: GENERAL RADIOLOGY | Facility: CLINIC | Age: 52
End: 2017-08-10
Attending: NURSE PRACTITIONER
Payer: COMMERCIAL

## 2017-08-10 ENCOUNTER — APPOINTMENT (OUTPATIENT)
Dept: PHYSICAL THERAPY | Facility: CLINIC | Age: 52
End: 2017-08-10
Attending: TRANSPLANT SURGERY
Payer: COMMERCIAL

## 2017-08-10 ENCOUNTER — APPOINTMENT (OUTPATIENT)
Dept: ULTRASOUND IMAGING | Facility: CLINIC | Age: 52
End: 2017-08-10
Attending: TRANSPLANT SURGERY
Payer: COMMERCIAL

## 2017-08-10 LAB
ALBUMIN SERPL-MCNC: 1.9 G/DL (ref 3.4–5)
ALBUMIN UR-MCNC: 30 MG/DL
ALP SERPL-CCNC: 136 U/L (ref 40–150)
ALT SERPL W P-5'-P-CCNC: 26 U/L (ref 0–70)
ANION GAP SERPL CALCULATED.3IONS-SCNC: 10 MMOL/L (ref 3–14)
APPEARANCE UR: ABNORMAL
AST SERPL W P-5'-P-CCNC: 36 U/L (ref 0–45)
BACTERIA #/AREA URNS HPF: ABNORMAL /HPF
BACTERIA SPEC CULT: ABNORMAL
BACTERIA SPEC CULT: NO GROWTH
BACTERIA SPEC CULT: NO GROWTH
BASE DEFICIT BLDA-SCNC: 7.5 MMOL/L
BILIRUB DIRECT SERPL-MCNC: 1.7 MG/DL (ref 0–0.2)
BILIRUB SERPL-MCNC: 2.2 MG/DL (ref 0.2–1.3)
BILIRUB UR QL STRIP: ABNORMAL
BUN SERPL-MCNC: 61 MG/DL (ref 7–30)
CALCIUM SERPL-MCNC: 8.5 MG/DL (ref 8.5–10.1)
CHLORIDE SERPL-SCNC: 112 MMOL/L (ref 94–109)
CO2 SERPL-SCNC: 18 MMOL/L (ref 20–32)
COLOR UR AUTO: YELLOW
CREAT SERPL-MCNC: 1.7 MG/DL (ref 0.66–1.25)
ERYTHROCYTE [DISTWIDTH] IN BLOOD BY AUTOMATED COUNT: 18.6 % (ref 10–15)
GFR SERPL CREATININE-BSD FRML MDRD: 42 ML/MIN/1.7M2
GLUCOSE BLDC GLUCOMTR-MCNC: 120 MG/DL (ref 70–99)
GLUCOSE BLDC GLUCOMTR-MCNC: 122 MG/DL (ref 70–99)
GLUCOSE BLDC GLUCOMTR-MCNC: 133 MG/DL (ref 70–99)
GLUCOSE BLDC GLUCOMTR-MCNC: 134 MG/DL (ref 70–99)
GLUCOSE BLDC GLUCOMTR-MCNC: 145 MG/DL (ref 70–99)
GLUCOSE BLDC GLUCOMTR-MCNC: 149 MG/DL (ref 70–99)
GLUCOSE SERPL-MCNC: 121 MG/DL (ref 70–99)
GLUCOSE UR STRIP-MCNC: NEGATIVE MG/DL
HCO3 BLD-SCNC: 18 MMOL/L (ref 21–28)
HCT VFR BLD AUTO: 21.3 % (ref 40–53)
HGB BLD-MCNC: 6.3 G/DL (ref 13.3–17.7)
HGB BLD-MCNC: 7.1 G/DL (ref 13.3–17.7)
HGB UR QL STRIP: NEGATIVE
KETONES UR STRIP-MCNC: NEGATIVE MG/DL
LDH SERPL L TO P-CCNC: 168 U/L (ref 85–227)
LEUKOCYTE ESTERASE UR QL STRIP: NEGATIVE
MAGNESIUM SERPL-MCNC: 1.9 MG/DL (ref 1.6–2.3)
MCH RBC QN AUTO: 29.7 PG (ref 26.5–33)
MCHC RBC AUTO-ENTMCNC: 33.3 G/DL (ref 31.5–36.5)
MCV RBC AUTO: 89 FL (ref 78–100)
MICRO REPORT STATUS: ABNORMAL
MICRO REPORT STATUS: NORMAL
MICRO REPORT STATUS: NORMAL
MUCOUS THREADS #/AREA URNS LPF: PRESENT /LPF
NITRATE UR QL: NEGATIVE
O2/TOTAL GAS SETTING VFR VENT: ABNORMAL %
PCO2 BLD: 34 MM HG (ref 35–45)
PH BLD: 7.33 PH (ref 7.35–7.45)
PH UR STRIP: 5 PH (ref 5–7)
PHOSPHATE SERPL-MCNC: 4.6 MG/DL (ref 2.5–4.5)
PLATELET # BLD AUTO: 129 10E9/L (ref 150–450)
PO2 BLD: 68 MM HG (ref 80–105)
POTASSIUM SERPL-SCNC: 5.1 MMOL/L (ref 3.4–5.3)
POTASSIUM SERPL-SCNC: 5.4 MMOL/L (ref 3.4–5.3)
POTASSIUM SERPL-SCNC: 5.5 MMOL/L (ref 3.4–5.3)
PROT SERPL-MCNC: 6 G/DL (ref 6.8–8.8)
RBC # BLD AUTO: 2.39 10E12/L (ref 4.4–5.9)
RBC #/AREA URNS AUTO: 0 /HPF (ref 0–2)
SODIUM SERPL-SCNC: 140 MMOL/L (ref 133–144)
SP GR UR STRIP: 1.01 (ref 1–1.03)
SPECIMEN SOURCE: ABNORMAL
SPECIMEN SOURCE: NORMAL
SPECIMEN SOURCE: NORMAL
SQUAMOUS #/AREA URNS AUTO: <1 /HPF (ref 0–1)
TACROLIMUS BLD-MCNC: 3.1 UG/L (ref 5–15)
TME LAST DOSE: ABNORMAL H
URATE CRY #/AREA URNS HPF: ABNORMAL /HPF
URN SPEC COLLECT METH UR: ABNORMAL
UROBILINOGEN UR STRIP-MCNC: NORMAL MG/DL (ref 0–2)
WBC # BLD AUTO: 6.1 10E9/L (ref 4–11)
WBC #/AREA URNS AUTO: 10 /HPF (ref 0–2)

## 2017-08-10 PROCEDURE — 76705 ECHO EXAM OF ABDOMEN: CPT

## 2017-08-10 PROCEDURE — 86900 BLOOD TYPING SEROLOGIC ABO: CPT | Performed by: TRANSPLANT SURGERY

## 2017-08-10 PROCEDURE — 00000146 ZZHCL STATISTIC GLUCOSE BY METER IP

## 2017-08-10 PROCEDURE — 36569 INSJ PICC 5 YR+ W/O IMAGING: CPT

## 2017-08-10 PROCEDURE — 85027 COMPLETE CBC AUTOMATED: CPT | Performed by: STUDENT IN AN ORGANIZED HEALTH CARE EDUCATION/TRAINING PROGRAM

## 2017-08-10 PROCEDURE — 25000128 H RX IP 250 OP 636: Performed by: NURSE PRACTITIONER

## 2017-08-10 PROCEDURE — 86901 BLOOD TYPING SEROLOGIC RH(D): CPT | Performed by: TRANSPLANT SURGERY

## 2017-08-10 PROCEDURE — 25000128 H RX IP 250 OP 636: Performed by: STUDENT IN AN ORGANIZED HEALTH CARE EDUCATION/TRAINING PROGRAM

## 2017-08-10 PROCEDURE — 80048 BASIC METABOLIC PNL TOTAL CA: CPT | Performed by: STUDENT IN AN ORGANIZED HEALTH CARE EDUCATION/TRAINING PROGRAM

## 2017-08-10 PROCEDURE — 36415 COLL VENOUS BLD VENIPUNCTURE: CPT | Performed by: NURSE PRACTITIONER

## 2017-08-10 PROCEDURE — 83615 LACTATE (LD) (LDH) ENZYME: CPT | Performed by: NURSE PRACTITIONER

## 2017-08-10 PROCEDURE — 97530 THERAPEUTIC ACTIVITIES: CPT | Mod: GP | Performed by: REHABILITATION PRACTITIONER

## 2017-08-10 PROCEDURE — 25000132 ZZH RX MED GY IP 250 OP 250 PS 637: Performed by: STUDENT IN AN ORGANIZED HEALTH CARE EDUCATION/TRAINING PROGRAM

## 2017-08-10 PROCEDURE — 36600 WITHDRAWAL OF ARTERIAL BLOOD: CPT | Performed by: OPTOMETRIST

## 2017-08-10 PROCEDURE — 99212 OFFICE O/P EST SF 10 MIN: CPT

## 2017-08-10 PROCEDURE — 84132 ASSAY OF SERUM POTASSIUM: CPT | Performed by: NURSE PRACTITIONER

## 2017-08-10 PROCEDURE — 80076 HEPATIC FUNCTION PANEL: CPT | Performed by: STUDENT IN AN ORGANIZED HEALTH CARE EDUCATION/TRAINING PROGRAM

## 2017-08-10 PROCEDURE — 36592 COLLECT BLOOD FROM PICC: CPT | Performed by: STUDENT IN AN ORGANIZED HEALTH CARE EDUCATION/TRAINING PROGRAM

## 2017-08-10 PROCEDURE — 84132 ASSAY OF SERUM POTASSIUM: CPT | Performed by: STUDENT IN AN ORGANIZED HEALTH CARE EDUCATION/TRAINING PROGRAM

## 2017-08-10 PROCEDURE — 25000132 ZZH RX MED GY IP 250 OP 250 PS 637: Performed by: NURSE PRACTITIONER

## 2017-08-10 PROCEDURE — 40000940 XR CHEST PORT 1 VW

## 2017-08-10 PROCEDURE — 84100 ASSAY OF PHOSPHORUS: CPT | Performed by: STUDENT IN AN ORGANIZED HEALTH CARE EDUCATION/TRAINING PROGRAM

## 2017-08-10 PROCEDURE — 85018 HEMOGLOBIN: CPT | Performed by: STUDENT IN AN ORGANIZED HEALTH CARE EDUCATION/TRAINING PROGRAM

## 2017-08-10 PROCEDURE — 83735 ASSAY OF MAGNESIUM: CPT | Performed by: STUDENT IN AN ORGANIZED HEALTH CARE EDUCATION/TRAINING PROGRAM

## 2017-08-10 PROCEDURE — 25000132 ZZH RX MED GY IP 250 OP 250 PS 637: Performed by: TRANSPLANT SURGERY

## 2017-08-10 PROCEDURE — 40000193 ZZH STATISTIC PT WARD VISIT: Performed by: REHABILITATION PRACTITIONER

## 2017-08-10 PROCEDURE — 27210195 ZZH KIT POWER PICC DOUBLE LUMEN

## 2017-08-10 PROCEDURE — 71010 XR CHEST PORT 1 VW: CPT

## 2017-08-10 PROCEDURE — C1769 GUIDE WIRE: HCPCS

## 2017-08-10 PROCEDURE — 86850 RBC ANTIBODY SCREEN: CPT | Performed by: TRANSPLANT SURGERY

## 2017-08-10 PROCEDURE — 81001 URINALYSIS AUTO W/SCOPE: CPT | Performed by: NURSE PRACTITIONER

## 2017-08-10 PROCEDURE — 80197 ASSAY OF TACROLIMUS: CPT | Performed by: STUDENT IN AN ORGANIZED HEALTH CARE EDUCATION/TRAINING PROGRAM

## 2017-08-10 PROCEDURE — 12000024 ZZH R&B TRANSPLANT CRITICAL

## 2017-08-10 PROCEDURE — 86923 COMPATIBILITY TEST ELECTRIC: CPT | Performed by: TRANSPLANT SURGERY

## 2017-08-10 PROCEDURE — 25000132 ZZH RX MED GY IP 250 OP 250 PS 637: Performed by: PHYSICIAN ASSISTANT

## 2017-08-10 PROCEDURE — 40000275 ZZH STATISTIC RCP TIME EA 10 MIN: Performed by: OPTOMETRIST

## 2017-08-10 PROCEDURE — 82803 BLOOD GASES ANY COMBINATION: CPT | Performed by: NURSE PRACTITIONER

## 2017-08-10 PROCEDURE — 40000556 ZZH STATISTIC PERIPHERAL IV START W US GUIDANCE

## 2017-08-10 PROCEDURE — 25000128 H RX IP 250 OP 636: Performed by: PHYSICIAN ASSISTANT

## 2017-08-10 PROCEDURE — 25000125 ZZHC RX 250: Performed by: NURSE PRACTITIONER

## 2017-08-10 PROCEDURE — P9041 ALBUMIN (HUMAN),5%, 50ML: HCPCS | Performed by: STUDENT IN AN ORGANIZED HEALTH CARE EDUCATION/TRAINING PROGRAM

## 2017-08-10 PROCEDURE — 25000132 ZZH RX MED GY IP 250 OP 250 PS 637: Performed by: SURGERY

## 2017-08-10 PROCEDURE — 93005 ELECTROCARDIOGRAM TRACING: CPT

## 2017-08-10 RX ORDER — MICONAZOLE NITRATE 20 MG/G
CREAM TOPICAL 2 TIMES DAILY
Status: DISCONTINUED | OUTPATIENT
Start: 2017-08-10 | End: 2017-09-08 | Stop reason: HOSPADM

## 2017-08-10 RX ORDER — LIDOCAINE 40 MG/G
CREAM TOPICAL
Status: DISCONTINUED | OUTPATIENT
Start: 2017-08-10 | End: 2017-09-08 | Stop reason: HOSPADM

## 2017-08-10 RX ORDER — ALBUMIN, HUMAN INJ 5% 5 %
25 SOLUTION INTRAVENOUS ONCE
Status: COMPLETED | OUTPATIENT
Start: 2017-08-10 | End: 2017-08-10

## 2017-08-10 RX ORDER — HEPARIN SODIUM,PORCINE 10 UNIT/ML
2-5 VIAL (ML) INTRAVENOUS
Status: COMPLETED | OUTPATIENT
Start: 2017-08-10 | End: 2017-08-10

## 2017-08-10 RX ORDER — SODIUM BICARBONATE 650 MG/1
650 TABLET ORAL 3 TIMES DAILY
Status: DISCONTINUED | OUTPATIENT
Start: 2017-08-10 | End: 2017-08-13

## 2017-08-10 RX ORDER — SODIUM CHLORIDE 9 MG/ML
INJECTION, SOLUTION INTRAVENOUS CONTINUOUS
Status: DISCONTINUED | OUTPATIENT
Start: 2017-08-10 | End: 2017-09-08 | Stop reason: HOSPADM

## 2017-08-10 RX ADMIN — PIPERACILLIN AND TAZOBACTAM 3.38 G: 3; .375 INJECTION, POWDER, LYOPHILIZED, FOR SOLUTION INTRAVENOUS; PARENTERAL at 04:01

## 2017-08-10 RX ADMIN — ALBUMIN (HUMAN) 25 G: 12.5 SOLUTION INTRAVENOUS at 10:44

## 2017-08-10 RX ADMIN — FLUDROCORTISONE ACETATE 0.1 MG: 0.1 TABLET ORAL at 20:00

## 2017-08-10 RX ADMIN — Medication 5 ML: at 08:04

## 2017-08-10 RX ADMIN — LIDOCAINE HYDROCHLORIDE 3.5 ML: 10 INJECTION, SOLUTION EPIDURAL; INFILTRATION; INTRACAUDAL; PERINEURAL at 17:15

## 2017-08-10 RX ADMIN — SODIUM BICARBONATE 650 MG TABLET 650 MG: at 20:00

## 2017-08-10 RX ADMIN — Medication 220 MG: at 08:04

## 2017-08-10 RX ADMIN — Medication 5 ML: at 20:00

## 2017-08-10 RX ADMIN — LOPERAMIDE HYDROCHLORIDE 4 MG: 2 SOLUTION ORAL at 20:00

## 2017-08-10 RX ADMIN — HEPARIN SODIUM 5000 UNITS: 5000 INJECTION, SOLUTION INTRAVENOUS; SUBCUTANEOUS at 08:15

## 2017-08-10 RX ADMIN — Medication 1 PACKET: at 13:12

## 2017-08-10 RX ADMIN — Medication 1 PACKET: at 08:11

## 2017-08-10 RX ADMIN — FLUDROCORTISONE ACETATE 0.1 MG: 0.1 TABLET ORAL at 08:12

## 2017-08-10 RX ADMIN — LINEZOLID 600 MG: 600 INJECTION, SOLUTION INTRAVENOUS at 11:53

## 2017-08-10 RX ADMIN — SODIUM CHLORIDE: 9 INJECTION, SOLUTION INTRAVENOUS at 10:42

## 2017-08-10 RX ADMIN — Medication 1 PACKET: at 22:46

## 2017-08-10 RX ADMIN — MICONAZOLE NITRATE: 20 CREAM TOPICAL at 13:11

## 2017-08-10 RX ADMIN — SULFAMETHOXAZOLE AND TRIMETHOPRIM 1 TABLET: 400; 80 TABLET ORAL at 08:12

## 2017-08-10 RX ADMIN — SODIUM CHLORIDE, PRESERVATIVE FREE 5 ML: 5 INJECTION INTRAVENOUS at 21:30

## 2017-08-10 RX ADMIN — PIPERACILLIN AND TAZOBACTAM 3.38 G: 3; .375 INJECTION, POWDER, LYOPHILIZED, FOR SOLUTION INTRAVENOUS; PARENTERAL at 10:41

## 2017-08-10 RX ADMIN — MICONAZOLE NITRATE: 20 CREAM TOPICAL at 19:54

## 2017-08-10 RX ADMIN — Medication 250 MG: at 08:07

## 2017-08-10 RX ADMIN — Medication 80 MG: at 08:04

## 2017-08-10 RX ADMIN — OXYCODONE HYDROCHLORIDE 5 MG: 5 SOLUTION ORAL at 04:21

## 2017-08-10 RX ADMIN — GANCICLOVIR SODIUM 650 MG: 500 INJECTION, POWDER, LYOPHILIZED, FOR SOLUTION INTRAVENOUS at 13:09

## 2017-08-10 RX ADMIN — TACROLIMUS 150 MCG/HR: 5 INJECTION, SOLUTION INTRAVENOUS at 19:53

## 2017-08-10 RX ADMIN — PIPERACILLIN AND TAZOBACTAM 3.38 G: 3; .375 INJECTION, POWDER, LYOPHILIZED, FOR SOLUTION INTRAVENOUS; PARENTERAL at 18:54

## 2017-08-10 RX ADMIN — LOPERAMIDE HYDROCHLORIDE 4 MG: 2 SOLUTION ORAL at 08:04

## 2017-08-10 RX ADMIN — SODIUM BICARBONATE 650 MG TABLET 650 MG: at 16:34

## 2017-08-10 RX ADMIN — LOPERAMIDE HYDROCHLORIDE 4 MG: 2 SOLUTION ORAL at 13:11

## 2017-08-10 RX ADMIN — PANTOPRAZOLE SODIUM 40 MG: 40 TABLET, DELAYED RELEASE ORAL at 08:04

## 2017-08-10 RX ADMIN — LOPERAMIDE HYDROCHLORIDE 4 MG: 2 SOLUTION ORAL at 16:35

## 2017-08-10 RX ADMIN — GANCICLOVIR SODIUM 650 MG: 500 INJECTION, POWDER, LYOPHILIZED, FOR SOLUTION INTRAVENOUS at 00:16

## 2017-08-10 RX ADMIN — LINEZOLID 600 MG: 600 INJECTION, SOLUTION INTRAVENOUS at 22:45

## 2017-08-10 NOTE — PROGRESS NOTES
CLINICAL NUTRITION SERVICES - REASSESSMENT NOTE     Nutrition Prescription    Malnutrition Status:    Severe malnutrition in the context of acute on chronic illness    Recommendations already ordered by Registered Dietitian (RD):  Discontinued TF and fiber orders.    Future/Additional Recommendations:  When able to resume TF -> recommend slight adjustment of TF rate to Nepro @ 65 ml/hr + 3 pkts Pro-Stat + 5 pkts Nutrisource Fiber (if still appropriate).  This slight adjustment will better meet patient's nutritional needs, particularly given lack of oral intake recently along with held TF.  Goal regimen would provide: 2934 kcal (32 kcal/kg), 153 grams protein (1.7g/kg), 251 grams CHO.     *Endo following.  Please notify Endo team of nutrition plan when nutrition support is resumed. If TF resumed and notify of rate change (65 ml/hr from 60 ml/hr) if increased as per recommendations above.     TPN recommendations (if necessary):   --Use dosing weight 89 kg  --Begin TPN, goal volume 1320 ml/day with initial 200g Dex daily (680 kcal, GIR 1.6), 135g AA daily (540 kcal), and 250 ml 20% IV lipids daily.  Micro/Rx: standard MVI/trace  --Advance PN dex by 30-35 g every 1-2 days (pending lytes/Glu and Pharm D/RD discretion) to initial goal of 300g Dex (1020 kcal) to increase provisions to 2060 kcals (23 kcal/kg/day),  1.5 g PRO/kg/day, GIR 2.3 with 24% kcals from Fat.    **Please re-check triglycerides for baseline prior to starting TPN for monitoring purposes.        TF: Nepro @ 60 ml/hr + 3 pkts Pro-Stat + 5 pkts Nutrisource Fiber    TF has been held since 4 PM yesterday (8/9) d/t procedures/abdominal CT findings.  Abdominal CT showed signs concerning for peritonitis and team would prefer to continue to hold TF at this time.      Weight has trended down to 89 kg (8/3/17), loss of 5.2 kg (5.5%) in the last 1 month.     MALNUTRITION  % Intake: </=75% for >/= 1 month (severe)  % Weight Loss: > 5% in 1 month  (severe)  Subcutaneous Fat Loss: Facial region and Thoracic/intercostal:  At least Mild - per previous  Muscle Loss: Temporal, Facial & jaw region, Thoracic region (clavicle, acromium bone, deltoid, trapezius, pectoral) and Upper leg (quadricep, hamstring):  Mild to Moderate - per previous  Fluid Accumulation/Edema: Mild: 2+ generalized  Malnutrition Diagnosis: Severe malnutrition in the context of acute on chronic illness    Janice Salomon MS, RD, LD  Pager [phone]

## 2017-08-10 NOTE — PROVIDER NOTIFICATION
RN entered pt room and found IJ was pulled by pt. Pressure applied to insertion site until bleeding stopped. MD notified, hand mitts ordered and placed on pt. Prograf gtt and K cocktail stopped (see MAR) until IV access gained. Vascular access paged. Will continue to monitor.

## 2017-08-10 NOTE — PROGRESS NOTES
[facility]  Transplant Infectious Diseases Progress Note      Camacho Bhagat MRN# [id number]   YOB: 1965 Age: 51 year old   Date of Service: 8/9/2017        Assessment and Recommendations:   Recommendations:  - As discussed, agree with initiation of empiric Zosyn and linezolid (for history of VRE carriage) with increasing signs of acute sepsis and right abdominal free air.  - This time around, source control is critical -- appreciate IR's attempt at RUQ abscess drainage.  - Continue IV ganciclovir dosing after hemodialysis runs.  - Continue TMP-SMX prophylaxis.  - Await the retroperitoneal drainage cultures and blood cultures from today.  - Continue to monitor blood CMV PCR assays weekly (next due 8/10/17).    Case discussed with the Transplant Surgery team this AM.  Transplant ID will follow with you.    Edwin Del Toro MD  Pager [phone]    Assessment:  A 51 year old gentleman immunosuppressed (tacrolimus; MMF on hold; received basiliximab initial induction) s/p liver transplant on 3/4/17 for CASTAÑEDA who was admitted 7/4/17 with colitis and underwent 7/26/17 exploratory laparotomy after forming a RUQ phlegmon possibly due to colonic perforation.  The initial admission diagnosis was neutropenic colitis, but new primary seroconversion CMV viremia was discovered on 7/10/17.  He had persistent fevers from 7/10 - 15/17 and again from 7/21 - 8/4/17, was afebrile for three+ days, but has spiked renewed fevers (up to 100.7 degrees) since 8/7/17 late evening and is looking septic with increased malaise and abdominal pain, obtundation, increased delirium, rising inflammatory markers, and a higher peripheral WBC.  Repeat 8/9/17 abdominal CT scan showed right sided free air.  A RUQ retroperitoneal abscess drain was placed by IR.    ID issues:    - Renewed sepsis / RUQ abdominal abscess:  Off antimicrobails (except TMP-SMX and Valcyte) for three days (after completing a ten day 7/25 -  "8/3/17 empiric course of meropenem / daptomycin / micafungin) he is once again appearing septic with a markedly increased procalcitonin, a rising serum CRP, and more confusion / obtundation and malaise.  Given his increased abdominal pain and the presence of right retroperitoneal free air, the source is almost certainly his abdomin.  The repeat 8/9/17 chest / abdomen CT scan showed little evidence of new infection above the diaphragm, but new infra-hepatic free air and an unchanged (versus the 8/4/17 abdominal CT scan) right retroperitoneal RUQ multifocal gas collection suggestive for an abscess.  Interventional Radiology placed a drain into that abscess 8/9/17 afternoon -- microbiology results are pending.  All this is in the wake of exploratory laparotomy on 7/26/17 with right angelique-colectomy / end ileostomy / mucous fistula / partial omentectomy -- intra-operatively no overt perforation was seen, but right colitis was observed with a probable intra-adominal abscess or phlegmon (thought possibly due to colonic perforation (which was seen on the colonic histopathology).  Other potential sites beyond the abdomen are lacking.  He has a CMV viremia but that remains likely controlled.  Blood cultures remain without growth, only colonizer elvie was found in BALs, and 8/5/17 ascites studies / cultures remain unremarkable.    - CMV viremia /  CMV colitis:  He presented with a \"detected\" low level CMV viremia (detected < 137 IU / ml) upon admission and developed a progressively worsening viremia after stopping his Valcyte prophylaxis.  Valcyte was re-started on 7/15/17 when the blood CMV PCR viral load was 4,225 IU / ml (3.6 log) and that was switched to ganciclovir on 7/20/17 with a viral load of 1,906 IU /ml (3.3 log). After one week of GCV treatment, the blood CMV PCR dropped to 290 IU / ml (2.5 log) on 7/27/17 and decreased further to < 137 IU / ml (< 2.1 log) on 8/3/17 (a > 1 log drop in the viremia over two weeks).  " The CMV immunostain of colonic tissue biopsy was positive; confirming a CMV etiology of his colitis.  Because of an initial slow pace of viremia decline, he had been treated with supra-high dose ganciclovir (doubled to 10 mg / kg / dose BID) for the possibility of ganciclovir resistance, but with the improving viral load on 8/3/17, his ganciclovir dose was reduced to 7.5 mg / kg IV BID on 8/4/17 PM -- we will watch weekly blood CMV PCR viral loads (next due to be checked 8/10/17) to make certain they remain suppressed at this lower dose.  (A CMV drug resistance genotype assay ordered on 7/27/17 went astray, so we have no genotypic data to help with decisions.)     - Improving encephalopathy:  His initial delirium and somnolence were likely toxic-metabolic encephalopathy precipitated by acute sepsis / medications side effects superimposed on chronic hepatic encephalopathy.  With defervescence and overall improvement, his mental status seems to be spontaneously clearing.  A 7/20/17 brain MRI scan was unremarkable and a repeat brain MRI has not yet been deemed needed.  An 8/5/17 serum cryptococcal antigen assay was negative.  The likelihood of any CNS infection at this time is exceeding low.    - Improved EBV viremia:  Initially discovered to have EBV viremia at 406,697  / ml on 7/18/17.  With decreased immunosuppression, that had fallen to 6,864  / ml by 8/1/17.  Checking blood EBV PCRs ~ weekly so far.  Random colonic biopsies from the 7/21/17 colonoscopy and 7/26/17 hemicolectomy histopathology results were not suggestive for PTLD.    - Multifactorial pancytopenia, resolved neutropenia:  Was likely due to medication and CMV viremia.  Neutropenia has resolved.    - S/p liver transplant:  Continue on tacrolimus; but MMF was held 6/27/17 in the context of sepsis and pancytopenia.    Old ID issues:  - Resolved EJ:  Was due to severe sepsis.  Creatinine is now down to < 1.   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  Nevertheless, he received a full ten day (7/25 - 8/3/17) daptomycin course for severe sepsis in the setting of colonization with VRE.   -  Staphylococcus capitis in ascitic fluid 7/5/17:  Was culture contamination.   - Rhinovirus in BAL 7/15/2017:  Likely to be due to chronic shedding, since his main presentation was abdominal, not respiratory.    Other ID issues:  - PCP prophylaxis: TMP-SMX was held in 6/17 due to neutropenia, but resumed 8/4/17.  - Serostatus:  CMV D+/R-, EBV D+/R-, HSV1?/2?, VZV ?.  Presently on IV ganciclovir.  - Immunization status: up-to-date.  - Gamma globulin status: >1660 as of 7/24/2017  - Isolation: Contact isolation for a history of VRE carriage.    Interval History:  Mr. Bhagat was afebrile (T max 99.5 degrees) 8/4/17 midday until 8/7/17 but spiked additional fevers over the past 24 hours (T max 100.4 degrees last night) with more dramatic obtundation and malaise this AM, a steadily increasing procalcitonin, and rising serum CRP at 190.0 this AM (versus 130.0 on 8/7/17), and a peripheral WBC 8.8 today still higher than his 4 - 6K baseline.  He had been on only ongoing IV ganciclovir (since 7/20/17) plus TMP-SMX prophylaxis (since 8/4/17), but empiric Zosyn plus linezolid (for his history of VRE) was initiated this morning due to the multiple markers of advancing sepsis.  (he also received fluconazole for two days 8/7 - 8/17 as a tacrolimus pharmokinetic boost.)  He appeared to have increased abdominal pain this morning.  A repeat chest / abdomen CT scan showed an unchanged (versus the 8/4/17 abdominal CT scan) right retroperitoneal RUQ multifocal gas collection with new intraperitoneal free air below the liver.  Marked abdominal ascites remain.  Interventional Radiology placed a drain into a retroperitoneal abscess this afternoon.  He was more  obtunded today, but was malaised, moaning, complaining of abdominal pain, confused, and restive.  Review of systems is otherwise mostly unobtainable, although there is no evident significant new dyspnea or hypoxia, nausea / emesis, rash, headache, limb changes, or other new obvious symptoms.  The 8/5/17 diagnostic paracentesis culture and fluid analysis results remain unremarkable to date.  The 8/3/17 blood CMV PCR viral load dropped to < 137 IU / ml on IV ganciclovir.  The [facility] nurse viewed his sacral decubitus ulcer on 8/7/17 and saw no evidence of focal infection at that site.    [facility] Infectious Disease Illness (copied from the 8/4/17 Transplant ID Note):   A 51 year old gentleman with PMH of DM, HTN, fibromyalgia, previous DVT with IVC filter,  s/p liver transplant secondary to ESLD due to CASTAÑEDA on 03/04/2017, CMV D+/R-, EBV D+/R-, who was admitted on 07/04 due to neutropenic enterocolitis starting empiric therapy with zosyn + flagyl + vancomycin + micafungin being transferred to the ICU, requiring exploratory laparotomy + wash out for neutropenic enterocolitis on 07/04, reporting inflamed cecum and ascending colon, having a second look on 07/05 finding improvement of the inflammation and performing closure of abdominal incision; culture resulted S. Capitis considered a contaminant. Valcyte was stopped since admission. On 07/06, flagyl + vancomycin + micafungin were stopped and zosyn changed to ertapenem. As per ID note from 07/06, recommended to switch back ertapenem to zosyn since thrombocytopenia was seen before zosyn treatment, pancytopenia possibly related to medication (MMF, bactrim, valcyte, initially seen at the beginning of June) and recommended to monitor CMV PCR weekly ( on 07/03: positive < 137; before on 06/26 was ND). Additionally, on 07/12 a BAL was repeated and showed VRE/CONS/P putida/C. Albicans, all were considered a colonizers, and primary team continued with ertapenem, held MMF,  bactrim, valcyte). On 07/13, it was recommended to re-start valcyte since his CMV PCR was 145 and counts improved. Patient persisted febrile, with evident ascitis tapped but culture resulted negative. Course complicated with thrombosis of the right arterial artery with ischemia to the tip of the ischemic finger. On 07/15, BAL was repeated and was positive for rhinovirus. Valcyte was re-initiated on 07/15 due to CMV PCR: 4225. PAtient was discharged from the ICU on 07/17. Also, 14 days of ertapenem were completed on 07/18 and EBV PCR was taken on 07/18 and resulted [id number] concerning of PTLD since patient persisted febrile with ascitis and colitis. On 07/20, ZAKIA was switched to GCV due to worsening level of 1906. Patient became encephalopathic but MRI resulted negative. On 07/21 a colonoscopy was performed and showed normal colonic mucosa; random biopsies were taken. On 07/25, patient deteriorated clinically presenting respiratory failure requiring intubation, so was transferred to ICU and started empiric treatment with meropenem + daptomycin + micafungin, and GCV dose was increased to 10 mg BID. A CT abdomen was repeated and showed retroperitoneal air so underwent EL + lysis of adhesions + right hemicolectomy + ileostomy + partial omentectomy  due to ascending colitis (no neida perforation or ischemia). On 07/27 CMV PCR resulted 290. Patient persists with intermittent fever and encephalopathy.    Transplants:  3/4/2017 (Liver)    Review of Systems:  CONSTITUTIONAL:  New fever to 100.7 degrees the last two nights.  Previously, no fever, chills, or sweats 8/4 - 7/17.  Chronic fatigue and anorexia.  Increased malaise.  EYES: No eye pain, visual changes, or scleral icterus.  ENT:  No rhinorrhea, sinus pain, otalgia, hearing loss, tinnitus, sore throat, or oral pain.  Left ear lobe pressure ulcer.  LYMPH:  No edema.  RESPIRATORY:  No cough, increased sputum, or dyspnea (on intermittent low flow oxygen).  CARDIOVASCULAR:   No chest pain, palpitations.  GASTROINTESTINAL:  Worsened abdominal pain.  Significant ascites.  S/p liver transplant.  Dysphagia.  Liquid stool in his ileostomy.  No nausea, vomiting, or constipation.  GENITOURINARY:  Improved EJ.  Intermittent urinary incontinence.  No dysuria.  HEME:  Chronic anemia.  Improved neutropenia.  No easy bruising.  ENDOCRINE:  Type 2 diabetes mellitus on insulin.  MUSCULOSKELETAL:  Necrotic toe tips.  Generally deconditioned.  Coccygeal decub.  No new focal myalgias / arthralgias.  SKIN:  No rash or pruritus.  NEURO:  Increased obtundation, more confused, weak memory.  No headache.  PSYCH:  Negative.    Past Medical / Surgical History:  Past Medical History:   Diagnosis Date     Cancer (H)      Depressive disorder, not elsewhere classified      Esophageal reflux      Fibromyalgia 1/2009    dx with Dr Benitez( Rheum)     History of thrombophlebitis      Osteoarthritis      Other acute embolism veins 11/01    Deep vein thrombophlebitis, filter placed     Other and unspecified hyperlipidemia      Other chronic nonalcoholic liver disease     Fatty liver      Other testicular hypofunction      Pneumonia, organism 10-01    Included ARDS, sepsis, and  acute renal failure; hospitalized     Rheumatoid arthritis(714.0)      Type II or unspecified type diabetes mellitus without mention of complication, not stated as uncontrolled 11/01    Managed by endocrinology     Unspecified essential hypertension 11/01    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     Unspecified sleep apnea     Uses BiPAP     Past Surgical History:   Procedure Laterality Date     BENCH LIVER N/A 3/4/2017    Procedure: BENCH LIVER;  Surgeon: Jovan Tran MD;  Location: [facility] OR     C NONSPECIFIC PROCEDURE      tracheostomy     C NONSPECIFIC PROCEDURE      repair of deviated septum     C NONSPECIFIC PROCEDURE  2007    Rt knee arthroscopy     C TOTAL KNEE ARTHROPLASTY  2008    Right knee arthroscopy     CHOLECYSTECTOMY        COLONOSCOPY N/A 2017    Procedure: COMBINED COLONOSCOPY, SINGLE OR MULTIPLE BIOPSY/POLYPECTOMY BY BIOPSY;  Colonoscopy;  Surgeon: Izaiah Montes MD;  Location: [facility] GI     ESOPHAGOSCOPY, GASTROSCOPY, DUODENOSCOPY (EGD), COMBINED N/A 2016    Procedure: COMBINED ESOPHAGOSCOPY, GASTROSCOPY, DUODENOSCOPY (EGD), BIOPSY SINGLE OR MULTIPLE;  Surgeon: Trent Pederson MD;  Location: [facility] GI     LAPAROTOMY EXPLORATORY N/A 2017    Procedure: LAPAROTOMY EXPLORATORY;  Exploratory Laparotomy, washout;  Surgeon: Tip Zhang MD;  Location: [facility] OR     LAPAROTOMY EXPLORATORY N/A 2017    Procedure: LAPAROTOMY EXPLORATORY;  Exploratory Laparotomy, Washout with closure.;  Surgeon: Tip Zhang MD;  Location: [facility] OR     LAPAROTOMY EXPLORATORY N/A 2017    Procedure: LAPAROTOMY EXPLORATORY;  Exploratory Laparotomy, Right angelique-colectomy, end ileostomy, mucosal fistula, partial omentectomy;  Surgeon: Sara Dinh MD;  Location: [facility] OR     TRANSPLANT LIVER RECIPIENT  DONOR N/A 3/4/2017    Procedure: TRANSPLANT LIVER RECIPIENT  DONOR;  Surgeon: Jovan Tran MD;  Location: [facility] OR     Current Scheduled Medications:    linezolid  600 mg Intravenous Q12H     piperacillin-tazobactam  3.375 g Intravenous Q6H     sodium chloride (PF)  10 mL Irrigation Q8H     [START ON 8/10/2017] heparin  5,000 Units Subcutaneous Q8H     fludrocortisone  0.1 mg Oral BID     multivitamin CF formula  5 mL Per Feeding Tube Daily     vitamin C  250 mg Per Feeding Tube Daily     zinc sulfate  220 mg Per Feeding Tube Daily     fiber modular  1 packet Per Feeding Tube 5x Daily     diphenoxylate-atropine  5 mL Oral BID     sodium chloride (PF)  3 mL Intracatheter Q8H     ganciclovir (CYTOVENE) intermittent infusion  7.5 mg/kg Intravenous Q12H     sulfamethoxazole-trimethoprim  1 tablet Oral Daily     loperamide  4 mg Oral or Feeding Tube 4x Daily     insulin aspart  1-12 Units Subcutaneous  Q4H     insulin aspart   Subcutaneous TID w/meals     protein modular  1 packet Per Feeding Tube TID     aspirin  80 mg Oral Daily     pantoprazole  40 mg Oral or Feeding Tube Daily     sodium chloride (PF)  3 mL Intracatheter Q8H     Physical Examination:  Vital sign ranges over the past 24 hours:  Temp:  [98  F (36.7  C)-100.4  F (38  C)] 98.3  F (36.8  C)  Pulse:  [101-112] 101  Heart Rate:  [101-114] 109  Resp:  [12-28] 28  BP: ()/(59-82) 134/82  SpO2:  [93 %-98 %] 96 %    Physical Examination:  GENERAL:  Somnolent, confused, toxic-appearing, fatigued-appearing, WDWN 51 year old man supine in bed.  EYES:  EOMI, PERRL, anicteric sclerae.  ENT:  No otorrhea.  NJ tube present.  Nasal cannula present.  No anterior oral lesion.  NECK:  Supple.  Right IJ CVC line site lacks inflammation.  LYMPH:  No lymphadenopathy.  LUNGS:  Few bilaterally scattered coarse rhonchi.  CARDIOVASCULAR:  Regular rate and rhythm, tachycardia resolved, normal S1, S2, without murmur, gallop, or rub.  ABDOMEN:  Normal bowel sounds, soft, slight generalized tenderness, mildly distended, midline stapled wound lacks inflammation with dressed superior mucous fistula, RLQ ileostomy with liquid stool.  :  No Crowe.  EXTREMITIES:  Distally warm, no edema,  ischemic right hallux and right second toe, also ischemic tip of left second toe, and right index, no ulcers.  Line site lacks inflammation.  NEUROLOGIC:  Briefly awakable, disoriented, moves extremities x 4.    Inflammatory Markers    Recent Labs   Lab Test  08/09/17   0711  08/07/17   0549  08/06/17   0633  08/05/17   0616  07/05/17   1810  03/05/17   0358  11/23/16   0813  11/16/16   0706   08/15/11   1151  04/11/11   1209  01/03/11   1246  02/15/10   1232   SED   --    --    --    --    --    --   45*   --    --   11  14  17*  25*   CRP  190.0*  130.0*  130.0*  140.0*  354.0  20.0*  58.0*  59.1*   < >  11.1*  9.0*  11.7*  17.5*    < > = values in this interval not displayed.     Immune  Globulin Studies     Recent Labs   Lab Test  07/24/17   0705  08/15/11   1243   IGG  1660*  1160   IGG1   --   734   IGG2   --   294   IGG3   --   7*   IGG4   --   59     Metabolic Studies       Recent Labs   Lab Test  08/09/17   1457  08/09/17   1155  08/09/17   0711  08/09/17   0021  08/08/17   2237  08/08/17   1536  08/08/17   0600   08/07/17   0549  08/06/17   1707  08/06/17   0633  08/05/17   0616   08/01/17   0651   07/25/17   0945   07/06/17   0316   NA   --    --   136   --    --    --   136   --   139  140  141  139   < >  150*   < >  137   < >  143   POTASSIUM   --   5.7*  5.7*  5.9*   --   6.1*  5.8*   --   5.4*  5.4*  5.4*  5.2   < >  4.3   < >  4.0   < >  5.0   CHLORIDE   --    --   111*   --    --    --   111*   --   114*  115*  114*  115*   < >  123*   < >  104   < >  116*   CO2   --    --   19*   --    --    --   17*   --   18*  18*  19*  18*   < >  20   < >  26   < >  18*   ANIONGAP   --    --   6   --    --    --   7   --   8  6  8  5   < >  7   < >  7   < >  9   BUN   --    --   56*   --    --    --   55*   --   51*  49*  47*  47*   < >  51*   < >  77*   < >  62*   CR   --    --   1.26*   --    --    --   1.18   --   1.06  1.01  0.95  0.90   < >  0.90   < >  2.60*   < >  2.75*   GFRESTIMATED   --    --   60*   --    --    --   65   --   73  77  83  88   < >  89   < >  26*   < >  24*   GLC   --    --   187*   --    --    --   152*   --   99  108*  98  92   < >  141*   < >  382*   < >  139*   A1C   --    --    --    --    --    --    --    --    --    --    --    --    --    --    --    --    --   Canceled, Test credited   Below Assay Range  NOTIFIED LEONARD ONEILL AT 0538 ON 7/6/17 BY CHRISSY JAMES   --    --   8.3*   --    --    --   8.2*   --   8.2*  7.9*  8.1*  8.0*   < >  8.1*   < >  7.6*   < >  6.9*   PHOS   --    --   5.2*   --    --    --   4.6*   --   4.9*   --   4.7*  4.1   < >  3.1   < >   --    < >  5.5*   MAG   --    --   2.1   --    --    --   2.0   --   2.1   --   2.2  2.3   < >  1.9    < >   --    < >  2.1   LACT  0.9   --    --    --   1.0   --    --    < >   --    --    --    --    --    --    < >   --    < >  1.5   CKT   --    --    --    --    --    --    --    --    --    --    --    --    --   16*   --   40   --    --     < > = values in this interval not displayed.     Hepatic Studies    Recent Labs   Lab Test  08/08/17   0600  08/02/17   0543  07/27/17   0410  07/25/17   1651  07/25/17   0945  07/25/17   0017  07/24/17   0705   07/11/17   0328   07/05/17   1810   BILITOTAL  0.5  0.4  1.3  0.6  0.5   --   0.4   < >  0.5   < >   --    ALKPHOS  172*  199*  143  242*  317*   --   264*   < >  158*   < >   --    ALBUMIN  2.1*  2.3*  2.4*  2.0*  1.9*   --   1.7*   < >  1.8*   < >   --    AST  28  62*  27  61*  90*   --   86*   < >  34   < >   --    ALT  25  42  27  50  60   --   54   < >  27   < >   --    LDH   --    --    --    --    --   258*   --    --    --    --   289*   GGT   --    --    --    --    --    --    --    --   58   --    --     < > = values in this interval not displayed.     Pancreatitis testing    Recent Labs   Lab Test  07/24/17   0705  07/17/17   0630  07/12/17   0342  07/10/17   0340  07/05/17   0346  03/05/17   0358  03/03/17   1458  02/21/17   1520  12/09/16   1159  02/08/16   1144   09/30/10   1734   AMYLASE   --    --    --    --    --   53  70   --    --    --    --   82   LIPASE   --    --    --    --    --   216   --   326  161   --    --   138   TRIG  303*  168*  114  177*  174*   --    --    --    --   99   < >   --     < > = values in this interval not displayed.     Hematology Studies      Recent Labs   Lab Test  08/09/17   0711  08/08/17   0600  08/07/17   0549  08/06/17   1207  08/06/17   0633  08/06/17   0111  08/05/17   0616  08/04/17   0548  08/03/17   0533  08/02/17   0543   WBC  8.8  9.4  5.5   --   5.2   --   6.3  6.9  5.1  5.0   ANEU   --    --   3.7   --   3.5   --   3.9  4.1  2.8  2.7   ALYM   --    --   1.5   --   1.5   --   2.2  2.5  1.9  2.0   ZINA    --    --   0.2   --   0.2   --   0.2  0.3  0.3  0.2   AEOS   --    --   0.1   --   0.0   --   0.0  0.0  0.0  0.1   HGB  7.7*  8.1*  7.7*  8.3*  7.6*  7.9*  6.9*  7.8*  7.3*  7.6*   HCT  23.3*  24.5*  23.7*   --   23.2*   --   20.9*  24.3*  23.5*  24.4*   PLT  122*  110*  116*   --   94*   --   94*  99*  92*  90*     Arterial Blood Gas Testing    Recent Labs   Lab Test  08/02/17   1216  07/26/17   2243  07/26/17   2133  07/26/17   2046  07/26/17 2017 07/25/17   1746  07/25/17   1037   PH  7.37  Incorrect specimen type  NOTTIED BY WOJCIECH DEWITT, NEW ORDER TO REPLACE.7/26/17 AT 2346 BY ZAINAB.  CORRECTED ON 07/26 AT 2350: PREVIOUSLY REPORTED AS 7.27     --    --   Canceled, Test credited   Incorrect specimen type    7.32*  7.14*   PCO2  31*  Incorrect specimen type  NOTTIED BY WOJCIECH DEWITT, NEW ORDER TO REPLACE.7/26/17 AT 2346 BY ZAINAB.  CORRECTED ON 07/26 AT 2350: PREVIOUSLY REPORTED AS 45     --    --   Canceled, Test credited   Incorrect specimen type    42  69*   PO2  69*  Incorrect specimen type  NOTTIED BY WOJCIECH DEWITT, NEW ORDER TO REPLACE.7/26/17 AT 2346 BY ZAINAB.  CORRECTED ON 07/26 AT 2350: PREVIOUSLY REPORTED AS 53     --    --   Canceled, Test credited   Incorrect specimen type    81  103   HCO3  18*  Incorrect specimen type  NOTTIED BY WOJCIECH DEWITT, NEW ORDER TO REPLACE.7/26/17 AT 2346 BY ZAINAB.  CORRECTED ON 07/26 AT 2350: PREVIOUSLY REPORTED AS 20     --    --   Canceled, Test credited   Incorrect specimen type    22  23   O2PER  21  Incorrect specimen type  NOTTIED BY WOJCIECH DEWITT, NEW ORDER TO REPLACE.7/26/17 AT 2346 BY ZAINAB.  CORRECTED ON 07/26 AT 2350: PREVIOUSLY REPORTED AS 40    40  62  100  100.0  100  40.0  7L     Urine Studies     Recent Labs   Lab Test  08/08/17   1600  08/05/17   0617  07/28/17   1042  07/25/17   1205  07/19/17   1150   URINEPH  5.0  5.0  5.0  5.0  5.0   NITRITE  Negative  Negative  Negative  Negative  Negative   LEUKEST  Negative  Negative  Negative  Trace*  Negative   WBCU  7*   5*  6*  5*  2     Vancomycin Levels     Recent Labs   Lab Test  07/06/17   0316  10/28/16   1405   VANCOMYCIN  22.1  14.4       Component Value Flag Ref Range Units Status Collected Lab      Procalcitonin 0.78   ng/ml Final 08/02/2017  5:28 PM 51     Microbiology:  8/9 Right retroperitoneal abscess drainage aerobic / anaerobic cxs:  NGTD.  Gram stain:  NOS, many WBCs (predominately PMNs).  8/9 x 2, 8/4 x 2, 8/2 x 2, 7/31 x 2, 7/28 x 2, 7/25 x 2, 7/15 Bld cxs NGTD / negative  8/5 ascites aerobic / anaerobic / fungal cxs NGTD.  Gram stain:  NOS, few WBCs (predominately PMNs), moderate RBCs.  Fluid analysis:  Yellow, slightly cloudy,  (91%N, 6% L, 3% M), albumin 1.6, protein 3.8.  8/5, 7/25 Ur cxs:  Negative  8/5 Serum CRAG negative  7/29 Sp cx:  Normal elvie, heavy C albicans  7/26 Ascites (OR) cx:  Negative.   Gram stain:  NOS, few WBCs.  7/25 Ascites cx: Negative.  Gram stain:  NOS, no WBCs  7/15 BAL fluid studies:  Yeast and Rhinovirus  7/12 BAL fluid studies:  Single colony of VRE, C albicans/dubliniensis   7/11 Sp cx:  VRE and Coag Neg Staph   7/5 Ascites cx:  S capitis    CMV viral loads    Recent Labs   Lab Test  08/03/17   0533  07/27/17   1109  07/20/17   1427  07/18/17   0530  07/15/17   1553   CSPEC  EDTA PLASMA  EDTA PLASMA  CORRECTED ON 07/28 AT 0840: PREVIOUSLY REPORTED AS Blood    Plasma  Plasma  Bronchoalveolar Lavage   CMVLOG  <2.1  2.5*  3.3*  3.0*  3.6*     CMV viral loads    Log IU/mL of CMVQNT   Date Value Ref Range Status   08/03/2017 <2.1 <2.1 [Log_IU]/mL Final   07/27/2017 2.5 (H) <2.1 [Log_IU]/mL Final   07/20/2017 3.3 (H) <2.1 [Log_IU]/mL Final   07/18/2017 3.0 (H) <2.1 [Log_IU]/mL Final   07/15/2017 3.6 (H) <2.1 [Log_IU]/mL Final   07/10/2017 2.2 (H) <2.1 [Log_IU]/mL Final   07/03/2017 <2.1 <2.1 [Log_IU]/mL Final   06/26/2017 Not Calculated <2.1 [Log_IU]/mL Final     CMV resistance testing    EBV DNA Copies/mL   Date Value Ref Range Status   08/01/2017 6864 (A) EBVNEG [Copies]/mL  Final   2017 426601 (A) EBVNEG [Copies]/mL Final     Pathology:   Colectomy path 17  - Colonic wall with perforation, edema, and acute serositis   - Background colonic mucosa with no significant histologic abnormality   - CMV immunostain is positive in rare (3) cells   - Terminal ileum with no significant histologic abnormality   - Viable, unremarkable surgical margins   - One benign lymph node (0/1)   - Appendix with fibrous obliteration of the tip     Colon biopsies 2017   A: Colon biopsy, ascending   B: Colon biopsy, descending   FINAL DIAGNOSIS:   A. COLON BIOPSY, ASCENDING:   - Colonic mucosa with no significant histologic abnormality   - Negative for intraepithelial lymphocytosis or deposition of   subepithelial thick collagenous band   B. COLON BIOPSY, DESCENDING:   - Crypt architecture distortion, consistent with healed prior injury   - Negative for active inflammation or dysplasia   - Negative for intraepithelial lymphocytosis or deposition of   subepithelial thick collagenous band     Cytology of ascitic fluid 2017   PERITONEAL FLUID, ASCITES:   -Negative for malignancy   Cytology of ascitic fluid 2017.   Peritoneal fluid, ascites:   - Negative for malignancy   - Acute and chronic inflammation present   Ascites fluid:  Rare to absent viable B cells.  (This specimen was collected on 17 but was not ordered/delivered to   lab/run until 17 - results should be interpreted with caution due   to low cellularity/viability.   Due to limited cellularity we were only able to analyze the B cells.   There is no immunophenotypic evidence of B cell non-Hodgkin lymphoma.   Neoplastic cells, including large cells, may not survive specimen   processing. This sample may not be representative. Final interpretation   requires correlation with morphologic and clinical features.)    Imagin/9 CT-guided retroperitoneal abscess drainage performed by IR.   Chest / abdm CT:  Unchanged right  retroperitoneal air with new foci of free intraperitoneal air in the right upper quadrant. No extravasation of oral contrast identified.  No significant change in large volume abdominal ascites, thickened peritoneum and diffuse bowel wall thickening concerning for peritonitis.  Small right and trace left pleural effusions with overlying atelectasis and patchy consolidations. Overall, the consolidations are slightly increased since the prior exam. Differential includes atelectasis or infection.  Secondary signs of portal hypertension similar to the prior exam.  Unchanged mild bilateral renal pelvocaliectasis.  8/8 CXR:  Increased bibasilar streaky opacities, compatible with atelectasis and/or infection.  Probable small bilateral pleural effusions.  Persistent low lung volumes.  8/5 Ultrasound-guided paracentesis performed by IR:  250 ccyellow-brown fluid sent for analysis.  8/4 Abdm CT:  Heterogenous area of right-sided retroperitoneal gas has mildly decreased in size (versus 7/25/17 scan).  Moderate to severe ascites with diffuse peritoneal enhancement.  This may represent diffuse infection, for example bacterial peritonitis.  Mild residual foci of free air in the abdomen is likely postsurgical.  Diffuse small bowel and colonic wall thickening, similar to prior, likely reactive to ascites/peritonitis.  Findings of portal hypertension including splenomegaly, ascites, esophageal and upper abdominal varices.  Small right pleural effusion with associated atelectasis. Improved left pleural effusion and basilar consolidation.  8/4 CXR:  Decreased lung volumes with improving perihilar and bibasilar opacities, which may represent atelectasis, pulmonary edema, or infection.  8/2 CXR: Decreased lung volumes with mildly increased perihilar and bibasilar opacities, which may represent pulmonary edema, atelectasis, or infection.  Small right pleural effusion.  8/2 BLE U/S:  Right leg: Normal RONAL. Mildly Abnormal TBI, suggestive  of small vessel disease. Patent right lower extremity arteries without evidence of hemodynamically significant stenosis.  Left leg: Normal RONAL. Abnormal TBI, suggestive of small vessel disease. Patent left lower extremity arteries without evidence of hemodynamically significant stenosis.  7/31 CXR:  Improving perihilar and bibasilar opacities likely represent infection/aspiration, atelectasis, or pulmonary edema.  Moderate opacities bilaterally persist.  Stable small to moderate pleural effusions bilaterally.  7/28 CXR:  Interval removal of the endotracheal tube.  Increased perihilar opacities, worsening infection versus pulmonary edema.  Increased bilateral moderate pleural effusions with overlying atelectasis/consolidation.  7/25 Chest / abdm CT:  New large heterogeneous area of right-sided retroperitoneal gas which is highly concerning for an infectious process.  Given the history of prior neutropenic colitis and biopsies, there could also be a component of stool from a ruptured viscus, despite the lack of  surrounding bowel loops and no extraluminal oral contrast. Surgical consultation is recommended. Bibasilar atelectasis versus consolidation with small bilateral pleural effusions.  Extensive mesenteric and soft tissue edema with large volume ascites. Numerous mesenteric and retroperitoneal lymph nodes which are presumably reactive.  Postsurgical changes of liver transplant.  7/20 Brain MRI:  Significant image degradation due to motion artifact, with no definite acute intracranial pathology. No abnormal contrast enhancing intracranial lesions.  Mucosal thickening in the sphenoid and maxillary sinuses and left greater than right mastoid fluid are nonspecific in the setting of recent intubation.  7/13 Chest / abdm CT:  Improved moderate right-sided pleural effusion and resolution of left-sided pleural effusion. There remain large areas of atelectasis/consolidation in both lungs, including in the left lower lobe  despite resolution of left-sided pleural effusion. Superimposed infectious process cannot be excluded.  Moderate-large amount of ascites increased from 7/8/2017, which makes it difficult to evaluate for the presence or absence of inflammatory change or infectious process in the abdomen or pelvis. No intra-abdominal abscess.  Stable small presumed hematoma within the ventral abdominal wall fat.  Splenomegaly.

## 2017-08-10 NOTE — PROGRESS NOTES
Went up 7A floor to place PICC but MD wants to do paracentesis prior to PICC. Primary RN to call VAS when patient is ready.

## 2017-08-10 NOTE — PROGRESS NOTES
Colorectal progress note  August 10, 2017    S: No acute issues overnight, afebrile, continues to be confused     O:    Temp:  [98  F (36.7  C)-99.8  F (37.7  C)] 99.1  F (37.3  C)  Pulse:  [101-107] 101  Heart Rate:  [] 117  Resp:  [12-28] 28  BP: ()/(59-82) 129/75  SpO2:  [85 %-99 %] 97 %      Intake/Output Summary (Last 24 hours) at 08/10/17 1005  Last data filed at 08/10/17 0600   Gross per 24 hour   Intake           1782.4 ml   Output             1015 ml   Net            767.4 ml       Gen: Alert, not oriented  Chest: nonlabored breathing  CV: RRR  Abdomen: soft, minimal distension, tender to palpation/peritonitic drain with serosang output, viable stomas, with output in ileostomy bag  Extremities: without cyanosis or edema    Labs:  Lab Results   Component Value Date    WBC 6.1 08/10/2017     Lab Results   Component Value Date    RBC 2.39 08/10/2017     Lab Results   Component Value Date    HGB 7.1 08/10/2017     Lab Results   Component Value Date    HCT 21.3 08/10/2017     Lab Results   Component Value Date    MCV 89 08/10/2017     Lab Results   Component Value Date    MCH 29.7 08/10/2017     Lab Results   Component Value Date    MCHC 33.3 08/10/2017     Lab Results   Component Value Date    RDW 18.6 08/10/2017     Lab Results   Component Value Date     08/10/2017       Last Basic Metabolic Panel:  Lab Results   Component Value Date     08/10/2017      Lab Results   Component Value Date    POTASSIUM 5.5 08/10/2017     Lab Results   Component Value Date    CHLORIDE 112 08/10/2017     Lab Results   Component Value Date    JACOB 8.5 08/10/2017     Lab Results   Component Value Date    CO2 18 08/10/2017     Lab Results   Component Value Date    BUN 61 08/10/2017     Lab Results   Component Value Date    CR 1.70 08/10/2017     Lab Results   Component Value Date     08/10/2017         A/P: 52 yo male with history of liver txp 3/2017, s/p negative exlap, colonoscopy with ascending colon  perf, POD 15 of exlap, right colectomy, ileostomy and mucous fistula. S/p retroperitoneal drain placement for retroperitoneal gas suspicious of an abscess. His exam is more consistent with peritonitis, although source is not well identified from imaging.    - Continue IV abx per ID and primary team     Seen and discussed with Colorectal fellow    Karla Kraus, PGY-1  [phone]    Attestation:  This patient has been seen and evaluated by me, Sara Dinh.  Discussed with the house staff team or resident(s) and agree with the findings and plan in this note.  Confusion continues. Clinically stable but only overall slightly improved. Labs and vitals are reasonable. No peritonitis but some tenderness mostly at drin site.  Sara Dinh MD  Colon and Rectal Surgery Attending  [phone]

## 2017-08-10 NOTE — PROGRESS NOTES
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  Now s/p ex lap with R hemicolectomy, end ileostomy, mucus fistula, partial omenectomy on 7/26.      Glucose stable in target, off TF.  Monitor for change in trend since off K-cocktail.    Plan:    -NPH discontinued  -glargine reduced to 16 units qPM (dosed for NO tube feeds)  -meal aspart 1unit/7g CHO ordered for meals and snacks (not taking any currently)  -aspart high correction q4h   -There is PRN D10W for TF interruption, but not needed now since glargine dosed for TFs off    Please notify diabetes team of changes planned for nutrition support schedule.     Outpatient diabetes follow up: Dr. Eckert at [facility]  Plan reviewed with pt and bedside nurse and primary team                 Interval History:   8/7/17 Pt upset about inpatient diabetes mgmt consult.  Transplant contacted pt's outpatient endocrinologist Dr. Eckert of [facility]-- no issue with inpatient team managing pt's glucose    The last 24 hours progress and nursing notes reviewed.   TF stopped yesterday afternoon due to concern for bowel perf.  No perf, but still concerning abdominal process.  TF to remain off for now.  RD has left recs for icnreased rate when it does resume, or TPN if needed.  K-cocktail discontinued this morning 0730.        Recent Labs  Lab 08/10/17  1157 08/10/17  0704 08/10/17  0604 08/10/17  0359 08/10/17  0146 08/09/17  2314 08/09/17  2203 08/09/17  2024  08/09/17  0711  08/08/17  0600  08/07/17  0549  08/06/17  1707   GLC  --  121*  --   --   --   --   --  176*  --  187*  --  152*  --  99  --  108*   BGM  120*  --  122* 133* 145* 144* 171*  --   < >  --   < >  --   < >  --   < >  --    < > = values in this interval not displayed.            Review of Systems:   See interval hx          Medications:   PTA taking Januvia and glargine versus glargine and aspart per carb    Active Diet Order      NPO per Anesthesia Guidelines for Procedure/Surgery Except for: Meds     Physical Exam:  Gen:  Resting in bed, NAD.   HEENT: NC/AT,  NJ feeding tube  Resp: unlabored at rest  ABD: large, round.  Colostomy   Neuro: disoriented, mitts on    /74  Pulse 101  Temp 98.6  F (37  C)  Resp 24  Ht 1.829 m (6')  Wt 92.6 kg (204 lb 1.6 oz)  SpO2 93%  BMI 27.68 kg/m2           Data:     Lab Results   Component Value Date    A1C  07/06/2017     Canceled, Test credited   Below Assay Range  NOTIFIED LEONARD ONEILL AT 0538 ON 7/6/17 BY EAANTOLIN      A1C 9.4 02/16/2017    A1C 6.2 12/14/2016    A1C 6.1 10/27/2016    A1C 8.3 07/02/2012            Recent Labs   Lab Test  08/10/17   1323  08/10/17   0704  08/09/17 2024   NA   --   140  138   POTASSIUM  5.4*  5.5*  5.7*   CHLORIDE   --   112*  110*   CO2   --   18*  20   ANIONGAP   --   10  7   GLC   --   121*  176*   BUN   --   61*  58*   CR   --   1.70*  1.47*   JACOB   --   8.5  8.6     CBC RESULTS:   Recent Labs   Lab Test  08/10/17   0704   WBC  6.1   RBC  2.39*   HGB  7.1*   HCT  21.3*   MCV  89   MCH  29.7   MCHC  33.3   RDW  18.6*   PLT  129*     Janice Thomas APRN [phone]    Diabetes Management job code [id number]

## 2017-08-10 NOTE — PLAN OF CARE
Problem: Goal Outcome Summary  Goal: Goal Outcome Summary  PT - per plan established by the Physical Therapist, according to functional mobility the  discharge recommendation is rehab for cont skilled PT to progress functional mobility. Pt limited by confusion, back pain and general weakness. Pt needing extra time for all mob, needing to go at own pace. Pt needing max A for supine to sit of only 30 sec, pt demanding to return to supine. Bed alarm back on at end of session.

## 2017-08-10 NOTE — PROVIDER NOTIFICATION
BMP and Hgb resulted. K+ still elevated at 5.7, K cocktail infusing at 25 ml/hr. Hgb 7.6. MD notified.

## 2017-08-10 NOTE — PROGRESS NOTES
Colorectal surgery staff.  Imaging and labs and vitals reviewed. Our fellow has also seen the patient. Some discomfort and confusion. Pain worse after drain placement. There is retroperitoneal air and what might be a phlegmon. Clinically, but report significantly worse over last 24-48 hours. Drain now serosanguinous. antibiotic regimen is now broadended. Appreciate ID follow-up. However, very unclear to me what potential invasive procedure would be appropriate or therapeutic in this setting.  We will follow with you.    Sara Dinh MD  Colon and Rectal Surgery Attending  Department of Surgery  [facility]

## 2017-08-10 NOTE — PROGRESS NOTES
"[facility]  WO Nurse Inpatient Adult Pressure INJURY (PI) Wound Assessment     Follow up assessment of PU(s) on pt's:  Ileostomy   Follow up assessment of PI on- left ear lobe and Sacrum      Data:   Patient History:      per MD note(s): This is a 51 year old male with complex PMH of CASTA[place]ED[place] cirrhosis s/p Liver transplant Mar 2017.  Was admitted on 2014 with abdominal pain, sepsis, CT at OSH showed \"right colonic wall thickening suggesting colitis\" underwent Ex-Lap and washout for neutropenic colitis, intra-op was noted to have inflammed cecum and ascending colon with murky fluid.  Abdomen was left open at the time and was closed on 2017.  Some concern for CMV colitis with low count CMV viremia/GI PTLD and subsequently underwent colonoscopy on :  No colitis was seen on visualized portions of mucosa.     Current mattress:  Pulsate mattress   Current pressure relieving devices: Foam dressing, pt able to turn independently but rolls on his back often due to hip pain. Per RN he has been complaining of pain on various part of his body.    Moisture Management:  Crowe's catheter and ileostomy      Current Diet / Nutrition:         Active Diet Order      NPO per Anesthesia Guidelines for Procedure/Surgery Except for: Meds      Kwasi Score  Av.5  Min: 10  Max: 22       Labs:     Recent Labs   Lab Test  17   0621   17   0711   17   0949   17   0316   ALBUMIN  1.8*   < >   --    < >   --    < >   --    HGB  8.1*   < >  7.7*   < >   --    < >  7.1*   INR   --    --    --    --   1.19*   < >  1.80*   WBC  3.4*   < >  8.8   < >   --    < >  0.8*   A1C   --    --    --    --    --    --   Canceled, Test credited   Below Assay Range  NOTIFIED LEONARD ONEILL AT 0538 ON 17 BY CHRISSY     CRP   --    --   190.0*   < >   --    < >   --     < > = values in this interval not displayed.                                                                                                       "                Pressure Injury Assessment  (location #1):   Sacrum  Wound History:   Pt was transferred from  to  on 7/25 now transferred back to  on 7/29. Had multiple procedures throughout the day on 7/25/17 per RN. Pt continues to be confused. He refuses to turn at times and rolls on his back right away due to c/o pain on his hips.        Pic taken- 7/26/17                                                  Pic taken-8/10    8/3/17- Unable to take picture today as pt wanted to roll on his back due to pain on his ribs  8/7/17- Unable to take picture today as pt not able to tolerate side lying due to pain on his hips  8/14/17- Phone not available to take picture today      Wound Base: 100% moist yellow fibrinous tissue with visible follicular buds    Specific Dimensions (length x width x depth, in cm) :   2.5 x 2 x0.2 cm (measured on 8/10/17)    Tunneling:  N/A    Undermining: N/A    Palpation of the wound bed:  Firm on bone    Slough appearance: None    Eschar appearance:  none    Periwound Skin: Healing moisture associated dermatitis with pinpoint satellite lesions    Color: pink    Temperature  normal     Drainage:  Moderate serous         Odor: none    Pain:  Painful    Pressure Injury Assessment  (location #2):   Left ear lobe (not assessed today)  Wound History:   Initially noted this injury during assessing the pt for sacral pressure injury. Now able to turn his head independently        Pic taken 7/26/17                                                                 Pic taken- 8/3/17    Wound Base: dry superficial thin scab, healing    Specific Dimensions (length x width x depth, in cm) :   1x0.5x0.0cm    Tunneling:  N/A    Undermining: N/A    Palpation of the wound bed:  none    Slough appearance:  none    Eschar appearance:  none    Periwound Skin: intact     Color: pink    Temperature  normal     Drainage:  none         Odor: none    Pain:  none             Intervention:     Patient's chart  "evaluated.      Kwasi Interventions:  Current Kwasi Interventions and Care Plan reviewed and updated, appropriate at this time.    Wound was assessed.    Wound Care: was done: per POC mentioned below,    Orders  Written    Supplies  Reviewed    Discussed plan of care with Nurse           Assessment:     Pressure Injury (PI) located on Sacrum: DTPI    Left ear lobe- stage 2    Status: wound  Follow up assessment, Symptomatic, Evolving      Healing moisture associated dermatitis with fungal component on bilateral buttocks and inner thighs.       New ileostomy and Mucus fistula- Initiated ostomy care teaching with his wife Danica today.         Plan:     Nursing to notify the Provider(s) and re-consult the WO Nurse if wound(s) deteriorate(s).  Plan of care for wound located on Sacrum :   Cleanse the area with NS and pat dry.  Apply No sting barrier to periwound skin.  Apply triad paste lightly only on open areas.  Cover wound with Sacral Mepilex (#[id number])  Change dressing Q 3 days or PRN.  Turn and reposition Q 2hrs.  Ensure pt has Adolph-cushion while sitting up in the chair.  Ensure pt is on pulsate mattress.   FYI- Only use Covidien pad no  pad.      BL buttocks and inner thigh BID-     Cleanse the area very gently with soft cloth.    Apply LEXI antifungal cream (comes from pharmacy)    With repeat application, do not scrub the paste, only remove soiled paste and reapply.    If complete removal of paste is necessary use baby oil/mineral oil and soft wash cloth.    Plan of care for wound located on left ear lobe: Monitor the area closely.     WO Nurse will return: twice a week  [facility]  WO Nurse Inpatient Ostomy Assessment       Follow up assessment of ostomy and needs for:  Ileostomy and Mucus fistula       Data:   History:    This is a 51 year old male with complex PMH of [place] cirrhosis s/p Liver transplant Mar 2017.  Was admitted on 7/4/2014 with abdominal pain, sepsis, CT at OSH showed \"right " "colonic wall thickening suggesting colitis\" underwent Ex-Lap and washout for neutropenic colitis, intra-op was noted to have inflammed cecum and ascending colon with murky fluid.  Abdomen was left open at the time and was closed on 7/5/2017.  Some concern for CMV colitis with low count CMV viremia/GI PTLD and subsequently underwent colonoscopy on 7/21:  No colitis was seen on visualized portions of mucosa.  Exam sub-optimal as colon filled with solid stool.  Random biopsies obtained.  On 7/25/2017 pt became more tachycardic, increasing O2 needs and altered, pt was intubated, hypotension responsive to IVFs, worsening kidney function.  Has not required pressors.  CT was obtained which showed a new large heterogeneous right sided retroperitoneal gas and air tracking along duodenum.  No contrast extravasation.  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  Despite conservative management with bowel rest and IV abx, pt continued to spike fevers.  Now s/p Exploratory laparotomy, right angelique-colectomy, end ileostomy, mucus fistula, partial omentectomy on 7/26.    Type of ostomy:  Ileostomy and Mucus fistula  Stoma assessment:   ? stoma:  11/4\" , viable, pink, pale, round, moist, and protruberant  ? A bridge in place?: No  ? Stent(s) in place?: Not applicable,   Mucocutaneous Junction (MCJ):  Intact   Peristomal skin:  Intact   Abdominal assessment: soft   ? N/G still in place?:  Yes   Output:  small, watery, green    I/O last 3 completed shifts:  In: 3600 [P.O.:580; I.V.:930; Other:30; NG/GT:520]  Out: 4320 [Urine:1700; Drains:370; Stool:2250]     Current pouching system:  [place], 57mm flat two piece, cut to fit and flat and barrier ring with minimal melting around the cutting edges.   Pain:  Complaining of pain during cleansing  ? Is pt still on a PCA? No    Diet:       Active Diet Order      NPO per Anesthesia Guidelines for Procedure/Surgery Except for: Meds            Intervention:     Patient's " chart evaluated.      Assessments done today:  Stoma, peristomal skin, and learning needs  ? Participants: pt and His wife Danica (ph no- [phone]). Pt continues to be confused and not able to participate.  ?  Educational intervention: method: Demonstrated pouch replacement using below mentioned supplies.  Prepared for discharge by: No discharge preparations started         Assessment:     Stoma assessment: viable and healthy    Complications: None    Learning needs/ comprehension: Wife is attentive and willing to learn.    Is pt able to demonstrate: 1. how to empty their pouch?  No: Demonstrated to wife today  2. How to read and write down correct I and O's?   No: Demonstrated to wife today    Effectiveness of current pouching/ supply plan: > 72 hrs         Plan:     Plan:   ? Current pouching system: Kyara 57 mm  2 pc flat kyara high output pouch with barrier ring    Education:  ? Educational needs: practice with How to empty pouch, Removal of pouch, Preparation of new pouch, Cutting out or evaluating a pattern, Applying paste or rings, Applying appliance to abdomen, Peristomal skin care, Diet and hydration / fluid balance, Odor / flatus management and Lifestyle Adjustments    ? Continue to prepare for discharge:  Supplies ordered, Completed supply list, Registered for samples from , Prescriptions or note left on chart for MD to sign/complete, Prepared for discharge to home with homecare, Discussed making a WOC Nurse outpatient appointment upon discharge, Discussed when to follow up with a WOC Nurse in the future and Discussed how/ where to order supplies   ? Do WOC Nurse recommend home care?  Possible TCU   Plan for ileostomy and Mucus fistula: RN to change pouch twice a week. WOC will initiate teaching once pt is stable.  Supplies Needed  [place] (2pc 57mm)  Wafer- (#[id number])  Pouch- (#[id number])  or  High output pouch - (#[id number]) depending upon output  Barrier ring- (#[id number])     Ileostomy  care- Change pouch twice a week  1. Gather all supplies and remove old pouch gently.  2. Cleanse peristomal skin with w arm water and soft wash cloth or gauze and dry thoroughly.  3. Cut the wafer to fit to stoma or use given pattern, apply barrier ring around the cutting surface and apply centering the stoma.  4. Attach the pouch  5. Massage around it for better adherence.    Face to face time- 55 mins for ostomy teaching and sacral wound assessment  Follow up- Thursday at 2:30 pm for return demonstration of pouch change with pt's wife Danica.

## 2017-08-10 NOTE — PLAN OF CARE
Problem: Goal Outcome Summary  Goal: Goal Outcome Summary  SLP: Dysphagia therapy cancelled.  Pt medically NPO with TF off due to concern for bowel perforation.  ST to f/u as medically appropriate.

## 2017-08-10 NOTE — PLAN OF CARE
Problem: Goal Outcome Summary  Goal: Goal Outcome Summary  Outcome: No Change  Tmax 99.0, remains tachycardic 90s-110s, BPs stable. Required 4 L oxyplus mask to maintain O2 sats at start of shift, now satting well on 2 L NC. BG checks q2hrs, 122-187. Remains confused and on VPM. Difficult to assess pain d/t mentation, PRN tylenol given x1 and oxycodone x1 for c/o pain. Pt removed IJ, PIV x3 replaced, prograf gtt and K cocktail stopped while IV access was lost (see MAR). Incontinent of urine. Ileostomy w/ moderate green output. Drain flushed per orders, serosanguinous output. NJ clamped, orders to hold TF and D10 infusion d/t lower Lantus dose. Turned q2hrs. Will continue POC.

## 2017-08-10 NOTE — PLAN OF CARE
Problem: Individualization  Goal: Patient Preferences  -120. Resp rate around 28 for most of the day. 95% on 2L NC. Pt oriented to self only. Several times throughout the shift he thought this RN was his daughter and his conversation has been illogical at times. VPM in place. Pt has been in bed most of the day. He occasionally gets his leg over the side of the bed rail, but has otherwise been calm. Mitts in place for line protection. Pt remains NPO. NJ clamped. PIV in place, but pt scheduled for PICC line placement this afternoon since he pulled IJ overnight. FK gtt infusing per orders. Albumin given x1. Pt incontinent of large urine earlier today. Orders in place for condom catheter, but so far they have not been staying in place after several attempts. Ileostomy in place with green stool output. Retroperitoneal drain in place, irrigated per orders. This morning pt c/o left sided abdominal pain. Abd US done. Plan for paracentesis today. Sacral wound cares done by WOC RN this morning. New wound care orders in place. Dressing changed to mucous fistula site.

## 2017-08-10 NOTE — PROGRESS NOTES
Interventional Radiology Progress Note August 10, 2017    S: Patient is alert and mildly confused. Limited history reveals improving abominal pain aroudn drain site.    O: Temp: 98.6  F (37  C) Temp src: Oral BP: 142/74   Heart Rate: 113 Resp: 24 SpO2: 93 % O2 Device: Nasal cannula Oxygen Delivery: 2 LPM    Temp (24hrs), Av.8  F (37.1  C), Min:98  F (36.7  C), Max:99.8  F (37.7  C)    WBC   Date Value Ref Range Status   08/10/2017 6.1 4.0 - 11.0 10e9/L Final     Gen: Patient in bed, somwhat confused  Abd: Diffusely tender with peritoneal signs. Right retroperitonal drain dressing clean and dry. Serosanguinous output in drainage bag.  Ext: Necrotic pedal digits, consistent with embolic source, likely related to pressors/sepsis    A/P: 50 y/o male with retroperitoneal abscess in setting of bowel perforation.  -Continue catheter to gravity drainage  -Flush with 10cc saline three times daily    Trent Acuna  Fellow, Interventional Radiology

## 2017-08-10 NOTE — PROGRESS NOTES
Immunosuppression Note:    Camacho Bhagat is a 51 year old male who is seen today  for immunosuppression management     I, Jovan Tran MD, I have examined the patient with the resident/PA/Fellow, discussed and agree with the note and findings.  I have reviewed today's vital signs, medications, labs and imaging. I reviewed the immunosuppression medications and levels. 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 Concern for CMV colitis with low count CMV viremia/GI PTLD and subsequently underwent colonoscopy on 7/21, random biopsies obtained.  On 7/25/2017 patient had respiratory distress, abdominal compartment syndrome and EJ with oliguria. Broad spectrum antibiotics were started.  He was intubated and had a paracentesis with 5L removed. He did not required pressors.  CT was obtained which showed a new large heterogeneous right sided retroperitoneal gas and air tracking along duodenum.  No contrast extravasation.  No intraperitoneal air noted. Colorectal surgery was consulted. Initial conservative management with bowel rest and IV abx. Pt continued to spike fevers despite IV abx.  Now s/p Exploratory laparotomy, right angelique-colectomy, end ileostomy, mucus fistula, partial omentectomy on 7/26.    Graft Function: Good function. LFTs acceptable.   Immunosuppression Management:   CellCept discontinued (6/27/17) due to neutropenia.   Prograf goal ~8 due to single IS.  Unable to obtain detectable level with suspension.   Level 3.1 on tacro gtt today.  Will discuss with pharmacy.  Cardiorespiratory: Stable respiratory status, NLB on RA  Hematology: Pancytopenia on admission, acute on chronic, secondary to medications (MMF, bactrim, valcyte). Improved with holding medications and neupogen. 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  Continue to hold MMF and bactrim.   -Anemia- Hemoglobin stable. WBC stable. Last blood transfusion on 7/26.   GI:   -Neutropenic colitis on admission. Colonoscopy 7/21. Biopsy: resolving injury secondary to possible drug induced vs infectious.   -Bowel perforation: 7/25 CT scan abd/pelvis, po contrast, showed a new large heterogeneous right sided retroperitoneal gas and air tracking along duodenum.  No contrast extravasation.  No intraperitoneal air noted. Colorectal surgery consulted.  Continued to spike high grade temperature despite IV antibiotics therefore patient taken to OR. s/p Exploratory laparotomy, right angelique-colectomy, end ileostomy, mucus fistula, partial omentectomy on 7/26. Findings no neida perforation, phlegmon/inflammationright colon. Colon pathology suggested colon perforation, negative for malignancy; CMV stain positive.    -Diet:  NPO due concern for peritonitis or possible bowel perf.  HOLD tube feeds.  -High output  ileostomy: Goal ileostomy output ~ 1000 cc in 24 hrs.   Loperamide 2mg q6H, fiber TID, Lomotil BID 5 mg.  R/o peritonitis or bowel perforation:  CT A/P 8/9 showed a persistent gas/fluid collection RLQ, peritonitis, some free air but no evidence of contrast extravasation.  Diagnostic para today.  Fluid/Electrolytes/Renal:   -Dehydration:  500ml albumin this morning for tachycardia, concern for sepsis.  -EJ: on admission, likely sec to high intraabdominal pressures and ischemic ATN sec to sepsis. Improved, but now Cr increasing again.  Concern for sepsis.  Nephrology consulted.  -Hypernatremia: resolved with free water.    -Hyperkalemia:  on florinef, increased 8/9.  K cocktail discontinued.  Avoid kayexalate due to recent bowel perforation.  Recheck K today.  Endocrine: DM type 2. Endocrine consulting for glycemic management.  On lantus/NPH.  BG trending up, concerning for infection.  Infectious disease:  ID following.  Tmax 99.8F.  WBC 6.1.  -Fever:  Procalcitonin elevated.  CT C/A/P 8/9 showed a persistent gas/fluid collection RLQ, peritonitis, some free air but no evidence of contrast extravasation.  IR placed a drain, gram stain negative.  UA not suggestive of UTI, blood cultures NGTD.  Concern for evolving sepsis due to increasing Cr, tachycardia, low grade fevers, AMS, increasing blood glucose.  Started empiric linezolid and zosyn on 8/9.  Diagnostic paracentesis today.  -CMV viremia: Primary CMV infection.  CMV colitis per pathology, peak serum 7/15 4225 IU/ml.  Continue IV ganciclovir. Follow CMV PCR weekly, pending today.  -EBV viremia:  Peak serum 406,697 copies/ml on 7/18.  Down to 6864 as of 8/1.  Check weekly.  -R/o SBP or abscess:  8/5 paracentesis with 250ml removed.  , cx negative.    Resolved issues:  -Severe sepsis: On admission, secondary to right colon phlegmon. Meropenem/daptomycin/micafungin tx x 10 days completed on 8/3. S/p right angelique-colectomy.  Path: CMV stain +, perforation of colon noted.  7/5 Fluid culture + staph capitis broth only (contamination).   Neuro: Toxic metabolic encephalopathy secondary to infection, prolonged hospital stay.  Now delirious.  Vascular:  Evidence of microvascular injury in digits (toes) this is most likely due to injury while patient was on pressor therapy with sepsis.  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   Activity: Deconditioned due to prolong hospital course. Daily PT/OT, encourage pt to be OOB to chair. Encourage pulmonary toilet.   Prophylaxis: DVT-Heparin SQ  Disposition: 7A.       Medical Decision Making: Medium    PILLO/Fellow/Resident Provider:   Evette Pennington NP [id number]    Faculty: Jovan Tran MD    __________________________________________________________________  Transplant History:.  3/4/2017 DD OLT with Dr. Tran (Liver), Postoperative day: 159     Interval History:   Overnight events:  Confused.  Increased pain in left abdomen today.    ROS:   A 10-point review of systems was negative except as noted above.    Meds:    miconazole with skin protectant   Topical BID     linezolid  600 mg Intravenous Q12H     piperacillin-tazobactam  3.375 g Intravenous Q6H     sodium chloride (PF)  10 mL Irrigation Q8H     fludrocortisone  0.1 mg Oral BID     multivitamin CF formula  5 mL Per Feeding Tube Daily     vitamin C  250 mg Per Feeding Tube Daily     zinc sulfate  220 mg Per Feeding Tube Daily     diphenoxylate-atropine  5 mL Oral BID     sodium chloride (PF)  3 mL Intracatheter Q8H     ganciclovir (CYTOVENE) intermittent infusion  7.5 mg/kg Intravenous Q12H     sulfamethoxazole-trimethoprim  1 tablet Oral Daily     loperamide  4 mg Oral or Feeding Tube 4x Daily     insulin aspart  1-12 Units Subcutaneous Q4H     insulin aspart   Subcutaneous TID w/meals     protein modular  1 packet Per Feeding Tube TID     aspirin  80 mg Oral Daily     pantoprazole  40 mg Oral or Feeding Tube Daily     sodium chloride (PF)  3 mL Intracatheter Q8H       Physical Exam:     Admit Weight: 94.2 kg (207 lb 11.2 oz) (SCALE 2)    Current vitals:   /74  Pulse 101  Temp 98.6  F (37  C)  Resp 24  Ht 1.829 m (6')  Wt 92.6 kg (204 lb 1.6 oz)  SpO2 93%  BMI 27.68 kg/m2    Vital sign ranges:    Temp:  [98  F (36.7  C)-99.8  F (37.7  C)] 98.6  F (37  C)  Pulse:  [101-104] 101  Heart Rate:  [] 113  Resp:  [12-28] 24  BP: (120-152)/(62-82) 142/74  SpO2:  [85 %-99 %] 93 %  Patient Vitals for the past 24 hrs:   BP Temp Temp src Pulse Heart Rate Resp SpO2   08/10/17 1134 142/74 98.6  F (37  C) - - 113 24 93 %   08/10/17 0841 - 99.1  F (37.3  C) Oral - 117 28 97 %    08/10/17 0759 129/75 99.2  F (37.3  C) - - 116 22 92 %   08/10/17 0356 137/76 99  F (37.2  C) Oral - 109 20 96 %   08/10/17 0019 - - - - - - 93 %   08/09/17 2321 126/63 98.2  F (36.8  C) Oral - 97 20 94 %   08/09/17 2218 - - - - - - 97 %   08/09/17 2157 129/68 99.8  F (37.7  C) Oral - 104 18 99 %   08/09/17 2105 - - - - - - 99 %   08/09/17 2037 - - - - - - 98 %   08/09/17 2035 - - - - - - (!) 89 %   08/09/17 2030 - - - - - - (!) 85 %   08/09/17 1925 134/82 98.3  F (36.8  C) Oral - 109 28 96 %   08/09/17 1544 145/75 98  F (36.7  C) Oral - 106 20 93 %   08/09/17 1530 145/78 - - - 103 - -   08/09/17 1515 140/64 - - - 104 - -   08/09/17 1500 138/62 - - - 102 - -   08/09/17 1445 141/78 - - - 101 - -   08/09/17 1430 152/73 - - 101 - 20 98 %   08/09/17 1350 120/62 - - 104 - 12 97 %     General Appearance: NAD  Skin: normal, warm, dry  Heart: sinus tach 100-110  Lungs: productive cough, diminished bilateral  Abdomen: soft, rounded, more tender . Ileostomy with brown output. Mucus fistula covered. Midline incision, c/d/i.   : No vazquez.   Extremities: No edema.  Necrotic blackened areas on right 1st & 2nd toes and left 2nd toe.  Neurologic: A&O x 2, waxes and wanes.       Data:   CMP    Recent Labs  Lab 08/10/17  0704 08/09/17 2024  08/09/17  0711  08/08/17  0600    138  --  136  --  136   POTASSIUM 5.5* 5.7*  < > 5.7*  < > 5.8*   CHLORIDE 112* 110*  --  111*  --  111*   CO2 18* 20  --  19*  --  17*   * 176*  --  187*  --  152*   BUN 61* 58*  --  56*  --  55*   CR 1.70* 1.47*  --  1.26*  --  1.18   GFRESTIMATED 42* 50*  --  60*  --  65   GFRESTBLACK 51* 61  --  72  --  78   JACOB 8.5 8.6  --  8.3*  --  8.2*   MAG 1.9  --   --  2.1  --  2.0   PHOS 4.6*  --   --  5.2*  --  4.6*   ALBUMIN 1.9*  --   --   --   --  2.1*   BILITOTAL 2.2*  --   --   --   --  0.5   ALKPHOS 136  --   --   --   --  172*   AST 36  --   --   --   --  28   ALT 26  --   --   --   --  25   < > = values in this interval not  "displayed.  CBC    Recent Labs  Lab 08/10/17  0704 08/09/17  2024 08/09/17  0711   HGB 7.1* 7.6* 7.7*   WBC 6.1  --  8.8   *  --  122*     COAGS    Recent Labs  Lab 08/05/17  0949   INR 1.19*      Urinalysis  Recent Labs   Lab Test  08/08/17   1600  08/05/17   0617   06/14/17   1508   04/11/16   1345   COLOR  Yellow  Yellow   < >   --    < >  Yellow   APPEARANCE  Slightly Cloudy  Slightly Cloudy   < >   --    < >  Clear   URINEGLC  Negative  Negative   < >   --    < >  30*   URINEBILI  Negative  Negative   < >   --    < >  Negative   URINEKETONE  Negative  Negative   < >   --    < >  Negative   SG  1.010  1.018   < >   --    < >  1.016   UBLD  Negative  Negative   < >   --    < >  Small*   URINEPH  5.0  5.0   < >   --    < >  5.0   PROTEIN  30*  30*   < >   --    < >  30*   NITRITE  Negative  Negative   < >   --    < >  Negative   LEUKEST  Negative  Negative   < >   --    < >  Negative   RBCU  3*  0   < >   --    < >  1   WBCU  7*  5*   < >   --    < >  1   UTPG   --    --    --   1.55*   --   0.41*    < > = values in this interval not displayed.          7/21/2017   SPECIMEN(S):   A: Colon biopsy, ascending   B: Colon biopsy, descending     FINAL DIAGNOSIS:   A. COLON BIOPSY, ASCENDING:   - Colonic mucosa with no significant histologic abnormality   - Negative for intraepithelial lymphocytosis or deposition of   subepithelial thick collagenous band     B. COLON BIOPSY, DESCENDING:   - Crypt architecture distortion, consistent with healed prior injury   - Negative for active inflammation or dysplasia   - Negative for intraepithelial lymphocytosis or deposition of   subepithelial thick collagenous band   - Please, see comment     COMMENT:   Specimen B, \"descending colon\" features lamina propria edema, slight   variation in crypt sizes and shapes and occasional crypt drop-out.  This   may indicate a resolving injury which could be drug-induced or   infectious.     CT scan 7/25/2017:   IMPRESSION:   1. New large " heterogeneous area of right-sided retroperitoneal gas  which is highly concerning for an infectious process. Given the  history of prior neutropenic colitis and biopsies, there could also be  a component of stool from a ruptured viscus, despite the lack of  surrounding bowel loops and no extraluminal oral contrast. Surgical  consultation is recommended.      2. Bibasilar atelectasis versus consolidation with small bilateral  pleural effusions.     3. Extensive mesenteric and soft tissue edema with large volume  ascites. Numerous mesenteric and retroperitoneal lymph nodes which are  presumably reactive.     4. Postsurgical changes of liver transplant.     [Urgent Result: Retroperitoneal gas]     Finding was identified on 7/25/2017 5:34 PM.      Dr. Santoyo was contacted by Dr. Atkins at 7/25/2017 5:37 PM and  verbalized understanding of the urgent finding.      I have personally reviewed the examination and initial interpretation  and I agree with the findings.     LUCI HOROWITZ, [facility]

## 2017-08-10 NOTE — CONSULTS
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     # EJ on baseline renal failure since liver transplant  Suspect ATN with infection/sepsis concerns and/or IV contrast exposure about 3 days prior and/or return to his prior renal function on Prograf with known Prograf renal toxicity (vasocontriction and chronic effects as well). Pt appears to have had adequate fluid challenge. Unknown effects on urination without I/Os.   -recommend UA with micro (given now creat rise)   -recommend urinary catheter (Crowe vs condom cath) and strict I/Os    # hyperkalemia (chronic), improving   Pt with hyperkalemia for months fitting a type 4 RTA from his Prograf. Additional contributions from EJ and elevated blood sugars (lack of adequate insulin) as well. Possible acidosis contribution as well with low bicarb, recommend checking ABG. Given downtrending to near normal levels now will not recommend further shifting at this time, rather CTM potassium. Gentle hydration and/or Lasix can also be used to help excrete potassium, but K-cocktail fluids did not make significant difference and will hold off on those two options at this time.   -agree with increase of Florinef to 0.1mg BID  -agree with Endo consult for improved BS control  -recommend sodium bicarb tabs 650mg PO TID   -recommend checking ABG  -continue to monitor potassium     # volume status  Pt appears euvolemic without edema on minimal oxygen. Fluid resuscitated and adequate fluid trial for EJ per I/Os.  Recommend being wary of additional IVFs (other than bolus IVFs for hypotension should that occur) pending his I/Os to monitor his UOP in EJ.     # electrolytes  Potassium as above. Sodium, calcium, magnesium WNL.   Hyperphosphatemia - due to EJ, not at concerning level at 4.6    # acid-base  Bicarb low at 18, see hyperkalemia for workup of suspected metabolic non-gap acidosis.    Recommendations were communicated to primary team in person.     Seen and discussed with Dr. 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     The pt has long hx of hyperkalemia and has had several renal consults over time, including pre-transplant, though of late his hyperkalemia has been attributed to his Prograf with start of Florinef as an outpt it appears. His creatinine has been elevated from previous normal baseline 0.9 since transplant/Prograf start as well. His creatinine had recovered from admission, though it is noteworthy that he had undetectable Prograf levels at that time, and has now been put on a drip of Prograf with first detectable level today 8/10. He had CT contrast on 8/4. He had intra-abd drain placed 8/9 for phlegmon drainage and start of linezolid and Zosyn with c/f infection. He has had rising creatinine starting 8/8 with these events reaching EJ level evening of 8/9. BPs have been stable throughout. Urine has not been tracking with pt wearing diapers. Pt got IVFs and was net positive 2L and 4L in the last two days. With regard to potassium, the pt has essentially had elevated or high normal potassium throughout, downtrending from 6.1 on  "8/8 with treatment with \"K-cocktail\" (LR with insulin, dextrose, bicarb, and calcium). Blood sugars have fluctuated 120-220s recently. Lasix 40mg IV x1 was given 8/8.     Has nausea but not vomiting her reports. Abd feels \"good.\" No SOB or LE swelling. No CP.     PAST MEDICAL HISTORY:  Reviewed with patient on 08/10/2017     Past Medical History:   Diagnosis Date     Cancer (H)      Depressive disorder, not elsewhere classified      Esophageal reflux      Fibromyalgia 1/2009    dx with Dr Benitez( Rhe[facility])     History of thrombophlebitis      Osteoarthritis      Other acute embolism veins 11/01    Deep vein thrombophlebitis, filter placed     Other and unspecified hyperlipidemia      Other chronic nonalcoholic liver disease     Fatty liver      Other testicular hypofunction      Pneumonia, organism 10-01    Included ARDS, sepsis, and  acute renal failure; hospitalized     Rheumatoid arthritis(714.0)      Type II or unspecified type diabetes mellitus without mention of complication, not stated as uncontrolled 11/01    Managed by endocrinology     Unspecified essential hypertension 11/01    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     Unspecified sleep apnea     Uses BiPAP       Past Surgical History:   Procedure Laterality Date     BENCH LIVER N/A 3/4/2017    Procedure: BENCH LIVER;  Surgeon: Jovan Tran MD;  Location: [facility] OR     C NONSPECIFIC PROCEDURE      tracheostomy     C NONSPECIFIC PROCEDURE      repair of deviated septum     C NONSPECIFIC PROCEDURE  2007    Rt knee arthroscopy     C TOTAL KNEE ARTHROPLASTY  2008    Right knee arthroscopy     CHOLECYSTECTOMY       COLONOSCOPY N/A 7/21/2017    Procedure: COMBINED COLONOSCOPY, SINGLE OR MULTIPLE BIOPSY/POLYPECTOMY BY BIOPSY;  Colonoscopy;  Surgeon: Izaiah Montes MD;  Location: [facility] GI     ESOPHAGOSCOPY, GASTROSCOPY, DUODENOSCOPY (EGD), COMBINED N/A 8/4/2016    Procedure: COMBINED ESOPHAGOSCOPY, GASTROSCOPY, DUODENOSCOPY (EGD), BIOPSY SINGLE OR " MULTIPLE;  Surgeon: Trent Pederson MD;  Location: [facility]     LAPAROTOMY EXPLORATORY N/A 2017    Procedure: LAPAROTOMY EXPLORATORY;  Exploratory Laparotomy, washout;  Surgeon: Tip Zhang MD;  Location: [facility]     LAPAROTOMY EXPLORATORY N/A 2017    Procedure: LAPAROTOMY EXPLORATORY;  Exploratory Laparotomy, Washout with closure.;  Surgeon: Tip Zhang MD;  Location: [facility]     LAPAROTOMY EXPLORATORY N/A 2017    Procedure: LAPAROTOMY EXPLORATORY;  Exploratory Laparotomy, Right angelique-colectomy, end ileostomy, mucosal fistula, partial omentectomy;  Surgeon: Sara Dinh MD;  Location: [facility]     TRANSPLANT LIVER RECIPIENT  DONOR N/A 3/4/2017    Procedure: TRANSPLANT LIVER RECIPIENT  DONOR;  Surgeon: Jovan Tran MD;  Location: [facility]        MEDICATIONS:  PTA Meds  Prior to Admission medications    Medication Sig Last Dose Taking? Auth Provider   gabapentin (NEURONTIN) 300 MG capsule Take 1 capsule (300 mg) by mouth 3 times daily   Toñito Camejo MD   amLODIPine (NORVASC) 5 MG tablet Take 1 tablet (5 mg) by mouth daily   Toñito Camejo MD   tacrolimus (PROGRAF - GENERIC EQUIVALENT) 1 MG capsule Take 4capsules (4mg) in the morning and 3 capsules (3 mg) in the evening. Take every 12 hours.   Toñito Camejo MD   mycophenolate (CELLCEPT - GENERIC EQUIVALENT) 250 MG capsule Take 1 capsule (250 mg) by mouth every 12 hours   Toñito Camejo MD   sulfamethoxazole-trimethoprim (BACTRIM/SEPTRA) 400-80 MG per tablet Take 1 tablet on Monday and take 1 tablet on Thursday   Toñito Camejo MD   fludrocortisone (FLORINEF) 0.1 MG tablet Take 1 tablet (0.1 mg) by mouth daily For elevated potassium   Toñito Camejo MD   oxyCODONE (ROXICODONE) 10 MG IR tablet Take 1 tablet (10 mg) by mouth daily as needed for moderate to severe pain   Shay Kirkpatrick MD   clotrimazole (MYCELEX) 10 MG LOZG lozenge Place 1 lozenge (1 Beatrice) inside cheek 4 times daily   Toñito Camejo MD   Linagliptin  (TRADJENTA PO) Take 5 mg by mouth   Reported, Patient   tadalafil (CIALIS) 5 MG tablet Take 1 tablet (5 mg) by mouth daily Never use with nitroglycerin, terazosin or doxazosin.   Toñito Rivas MD   tadalafil (CIALIS) 5 MG tablet Take 1 tablet (5 mg) by mouth daily Never use with nitroglycerin, terazosin or doxazosin.   Toñito Rivas MD   cyclobenzaprine (FLEXERIL) 10 MG tablet Take 1 tablet (10 mg) by mouth 3 times daily as needed for muscle spasms   Toñito Camejo MD   omeprazole (PRILOSEC) 20 MG CR capsule Take 1 capsule (20 mg) by mouth 2 times daily (before meals)   Toñito Camejo MD   magnesium oxide (MAG-OX) 400 MG tablet Take 1 tablet (400 mg) by mouth 2 times daily   Adonay Cross MD   lidocaine (LIDODERM) 5 % Patch Place 1 patch onto the skin every 24 hours  Patient not taking: Reported on 6/7/2017   Dora Elizabeth NP   LACTOBACILLUS EXTRA STRENGTH CAPS Take 1 capsule by mouth 2 times daily   Dora Elizabeth NP   prochlorperazine (COMPAZINE) 5 MG tablet Take 1-2 tablets (5-10 mg) by mouth every 6 hours as needed for nausea or vomiting  Patient not taking: Reported on 6/7/2017   Dora Elizabeth NP   oxyCODONE (ROXICODONE) 5 MG IR tablet Take 1-2 tablets (5-10 mg) by mouth every 4 hours as needed for moderate to severe pain   Ada Driver PA-C   aspirin 325 MG tablet 1 tablet (325 mg) by Oral or Feeding Tube route daily   Ada Driver PA-C   insulin aspart (NOVOLOG PEN) 100 UNIT/ML injection DOSE:  1 units per 8 grams of carbohydrate. With meals and snacks. Only chart total amount of units given.  Do not give if pre-prandial glucose is less than 60 mg/dL.   Ada Driver PA-C   insulin glargine (LANTUS) 100 UNIT/ML injection Inject 45 Units Subcutaneous every 24 hours  Patient taking differently: Inject 50 Units Subcutaneous every 24 hours    Ada Driver PA-C   valGANciclovir (VALCYTE) 450 MG tablet Take 1 tablet (450 mg) by mouth daily   [place]illa  Ada Boone PA-C   multivitamin, therapeutic with minerals (THERA-VIT-M) TABS tablet Take 1 tablet by mouth daily   Ada Driver PA-C   ondansetron (ZOFRAN-ODT) 4 MG ODT tab Take 1 tablet (4 mg) by mouth every 8 hours as needed for nausea   Ada Driver PA-C   glucagon 1 MG SOLR injection Inject 1 mg Subcutaneous every 15 minutes as needed for low blood sugar (May repeat x 1 only)   Godwin Willson MD      Current Meds    miconazole with skin protectant   Topical BID     sodium bicarbonate  650 mg Per NG tube TID     linezolid  600 mg Intravenous Q12H     piperacillin-tazobactam  3.375 g Intravenous Q6H     sodium chloride (PF)  10 mL Irrigation Q8H     fludrocortisone  0.1 mg Oral BID     multivitamin CF formula  5 mL Per Feeding Tube Daily     vitamin C  250 mg Per Feeding Tube Daily     zinc sulfate  220 mg Per Feeding Tube Daily     diphenoxylate-atropine  5 mL Oral BID     sodium chloride (PF)  3 mL Intracatheter Q8H     ganciclovir (CYTOVENE) intermittent infusion  7.5 mg/kg Intravenous Q12H     sulfamethoxazole-trimethoprim  1 tablet Oral Daily     loperamide  4 mg Oral or Feeding Tube 4x Daily     insulin aspart  1-12 Units Subcutaneous Q4H     insulin aspart   Subcutaneous TID w/meals     protein modular  1 packet Per Feeding Tube TID     aspirin  80 mg Oral Daily     pantoprazole  40 mg Oral or Feeding Tube Daily     sodium chloride (PF)  3 mL Intracatheter Q8H     Infusion Meds    NaCl 10 mL/hr at 08/10/17 1042     tacrolimus (Prograf) infusion ADULT 150 mcg/hr (08/10/17 1450)     - MEDICATION INSTRUCTIONS -       IV fluid REPLACEMENT ONLY Stopped (08/09/17 1968)       ALLERGIES:    Allergies   Allergen Reactions     Erythromycin GI Disturbance     Vioxx      Nausea, vomiting       REVIEW OF SYSTEMS:  A 10 point review of systems was negative except as noted above.    SOCIAL HISTORY:   Social History     Social History     Marital status:      Spouse name: N/A     Number of children:  1     Years of education: N/A     Occupational History            Social History Main Topics     Smoking status: Former Smoker     Smokeless tobacco: Former User     Types: Chew     Quit date: 10/8/2015      Comment: Has used chewing tobacco since age 15 , chewed for 20yrs      Alcohol use No      Comment: last drink about a year ago (quit )     Drug use: No     Sexual activity: Yes     Partners: Female     Other Topics Concern     Not on file     Social History Narrative    uSED TO BE      Back in school now         Reviewed with patient    FAMILY MEDICAL HISTORY:   Family History   Problem Relation Age of Onset     DIABETES Father      Hypertension Father      Substance Abuse Father      Arthritis Mother      CANCER Mother      Thyroid     Thyroid Disease Mother      Other Cancer Mother      Colon Cancer No family hx of      Hyperlipidemia No family hx of      Coronary Artery Disease No family hx of      CEREBROVASCULAR DISEASE No family hx of      Breast Cancer No family hx of      Prostate Cancer No family hx of      Reviewed with patient     PHYSICAL EXAM:   Temp  Av.4  F (37.4  C)  Min: 97  F (36.1  C)  Max: 103.5  F (39.7  C)  Arterial Line MAP (mmHg)  Av.6 mmHg  Min: 39 mmHg  Max: 281 mmHg  Arterial Line BP  Min: 41/40  Max: 284/281      Pulse  Av.4  Min: 62  Max: 119 Resp  Av.9  Min: 10  Max: 45  SpO2  Av.2 %  Min: 84 %  Max: 100 %  FiO2 (%)  Av.7 %  Min: 30 %  Max: 100 %    CVP (mmHg): 6 mmHg  /76 (BP Location: Right arm)  Pulse 109  Temp 97.8  F (36.6  C) (Oral)  Resp 16  Ht 1.829 m (6')  Wt 92.6 kg (204 lb 1.6 oz)  SpO2 92%  BMI 27.68 kg/m2   Date 08/10/17 0700 - 17 0659   Shift 1524-4768 6377-5943 4922-1240 24 Hour Total   I  N  T  A  K  E   Other 10   10    NG/   120    Colloid 500   500    Shift Total  (mL/kg) 630  (6.81)   630  (6.81)   O  U  T  P  U  T   Drains 75   75    Stool 100   100    Shift  Total  (mL/kg) 175  (1.89)   175  (1.89)   Weight (kg) 92.58 92.58 92.58 92.58        Admit Weight: 94.2 kg (207 lb 11.2 oz) (SCALE 2)     GENERAL APPEARANCE: NAD  HEENT: anicteric   Pulmonary: lungs clear to auscultation   CV: regular rhythm, normal rate, no rub  GI: surgical wound CDI with ostomy site and R sided drain, very TTP   MS: no LE edema   : no Crowe   SKIN: no rash, warm, dry, no cyanosis  NEURO: alert and oriented to person but not place or time, answering questions appropriately     LABS:   CMP  Recent Labs  Lab 08/10/17  1323 08/10/17  0704 08/09/17 2024 08/09/17  1155 08/09/17  0711  08/08/17  0600 08/07/17  0549   NA  --  140 138  --  136  --  136 139   POTASSIUM 5.4* 5.5* 5.7* 5.7* 5.7*  < > 5.8* 5.4*   CHLORIDE  --  112* 110*  --  111*  --  111* 114*   CO2  --  18* 20  --  19*  --  17* 18*   ANIONGAP  --  10 7  --  6  --  7 8   GLC  --  121* 176*  --  187*  --  152* 99   BUN  --  61* 58*  --  56*  --  55* 51*   CR  --  1.70* 1.47*  --  1.26*  --  1.18 1.06   GFRESTIMATED  --  42* 50*  --  60*  --  65 73   GFRESTBLACK  --  51* 61  --  72  --  78 88   JACOB  --  8.5 8.6  --  8.3*  --  8.2* 8.2*   MAG  --  1.9  --   --  2.1  --  2.0 2.1   PHOS  --  4.6*  --   --  5.2*  --  4.6* 4.9*   PROTTOTAL  --  6.0*  --   --   --   --  6.4*  --    ALBUMIN  --  1.9*  --   --   --   --  2.1*  --    BILITOTAL  --  2.2*  --   --   --   --  0.5  --    ALKPHOS  --  136  --   --   --   --  172*  --    AST  --  36  --   --   --   --  28  --    ALT  --  26  --   --   --   --  25  --    < > = values in this interval not displayed.  CBC  Recent Labs  Lab 08/10/17  0704 08/09/17 2024 08/09/17 0711 08/08/17  0600 08/07/17  0549   HGB 7.1* 7.6* 7.7* 8.1* 7.7*   WBC 6.1  --  8.8 9.4 5.5   RBC 2.39*  --  2.61* 2.77* 2.71*   HCT 21.3*  --  23.3* 24.5* 23.7*   MCV 89  --  89 88 88   MCH 29.7  --  29.5 29.2 28.4   MCHC 33.3  --  33.0 33.1 32.5   RDW 18.6*  --  18.5* 17.8* 17.5*   *  --  122* 110* 116*     INR  Recent  Labs  Lab 08/05/17  0949   INR 1.19*     ABG  Recent Labs  Lab 08/10/17  1515   PH 7.33*   PCO2 34*   PO2 68*   HCO3 18*   O2PER 2L      URINE STUDIES  Recent Labs   Lab Test  08/08/17   1600  08/05/17   0617  07/28/17   1042  07/25/17   1205   COLOR  Yellow  Yellow  Yellow  Dark Yellow   APPEARANCE  Slightly Cloudy  Slightly Cloudy  Slightly Cloudy  Cloudy   URINEGLC  Negative  Negative  Negative  70*   URINEBILI  Negative  Negative  Negative  Negative   URINEKETONE  Negative  Negative  Negative  Negative   SG  1.010  1.018  1.012  1.014   UBLD  Negative  Negative  Trace*  Moderate*   URINEPH  5.0  5.0  5.0  5.0   PROTEIN  30*  30*  30*  100*   NITRITE  Negative  Negative  Negative  Negative   LEUKEST  Negative  Negative  Negative  Trace*   RBCU  3*  0  5*  >182*   WBCU  7*  5*  6*  5*     Recent Labs   Lab Test  06/14/17   1508  04/11/16   1345  02/08/16   1234  04/11/11   1201   [facility]  1.55*  0.41*  0.33*  <0.08     PTH  No lab results found.  IRON STUDIES  Recent Labs   Lab Test  02/08/16   1144   IRON  93   FEB  230*   IRONSAT  41       IMAGING:  Abd US:  Impression:   1.  Small to moderate volume complex ascites visualized, greatest in  the lower quadrants, left greater than right. Right retroperitoneal  air again noted.  2.  Right-sided pleural effusion.    CT c/a/p no contrast:  IMPRESSION:   1. Unchanged right retroperitoneal air with new foci of free  intraperitoneal air in the right upper quadrant. No extravasation of  oral contrast identified.  2. No significant change in large volume abdominal ascites, thickened  peritoneum and diffuse bowel wall thickening concerning for  peritonitis.  3. Small right and trace left pleural effusions with overlying  atelectasis and patchy consolidations. Overall, the consolidations are  slightly increased since the prior exam. Differential includes  atelectasis or infection.  4. Secondary signs of portal hypertension similar to the prior exam.  5. Unchanged mild  bilateral renal pelvocaliectasis.    EKG 8/8: no peaked T waves    Nolan Palmer MD

## 2017-08-10 NOTE — PROVIDER NOTIFICATION
O2 sats dropped to mid 80s (84-87%) on 2 L NC, increased to 4 L still high 80s (87-89%), placed on 4 L oxyplus mask, O2 sats now high 90s (95-98%). RT paged and assessed, recommend CPAP overnight. MD paged to obtain order. Pt on continuous pulse oximetry. Will continue to monitor.

## 2017-08-10 NOTE — PROGRESS NOTES
Medicine Consult and Procedure Service    We were asked to perform a diagnostic and therapeutic paracentesis in this patient with complex, loculated LLQ ascites.  I performed an ultrasound and the fluid is quite thick with marked septations; would defer to IR for paracentesis/possible drain placement.    Thank you for the consult.    Mir Villanueva MD  [phone]

## 2017-08-11 ENCOUNTER — APPOINTMENT (OUTPATIENT)
Dept: INTERVENTIONAL RADIOLOGY/VASCULAR | Facility: CLINIC | Age: 52
End: 2017-08-11
Attending: NURSE PRACTITIONER
Payer: COMMERCIAL

## 2017-08-11 ENCOUNTER — APPOINTMENT (OUTPATIENT)
Dept: PHYSICAL THERAPY | Facility: CLINIC | Age: 52
End: 2017-08-11
Attending: TRANSPLANT SURGERY
Payer: COMMERCIAL

## 2017-08-11 ENCOUNTER — APPOINTMENT (OUTPATIENT)
Dept: OCCUPATIONAL THERAPY | Facility: CLINIC | Age: 52
End: 2017-08-11
Attending: TRANSPLANT SURGERY
Payer: COMMERCIAL

## 2017-08-11 LAB
ABO + RH BLD: NORMAL
ABO + RH BLD: NORMAL
ALBUMIN FLD-MCNC: 1.4 G/DL
AMYLASE FLD-CCNC: 19 U/L
ANION GAP SERPL CALCULATED.3IONS-SCNC: 8 MMOL/L (ref 3–14)
APPEARANCE FLD: NORMAL
BILIRUB FLD-MCNC: 1.2 MG/DL
BLD GP AB SCN SERPL QL: NORMAL
BLD PROD TYP BPU: NORMAL
BLD UNIT ID BPU: 0
BLD UNIT ID BPU: 0
BLOOD BANK CMNT PATIENT-IMP: NORMAL
BLOOD PRODUCT CODE: NORMAL
BLOOD PRODUCT CODE: NORMAL
BPU ID: NORMAL
BPU ID: NORMAL
BUN SERPL-MCNC: 64 MG/DL (ref 7–30)
CALCIUM SERPL-MCNC: 8.5 MG/DL (ref 8.5–10.1)
CHLORIDE SERPL-SCNC: 114 MMOL/L (ref 94–109)
CMV DNA SPEC NAA+PROBE-ACNC: ABNORMAL [IU]/ML
CMV DNA SPEC NAA+PROBE-LOG#: <2.1 {LOG_IU}/ML
CO2 SERPL-SCNC: 19 MMOL/L (ref 20–32)
COLOR FLD: YELLOW
CREAT SERPL-MCNC: 1.82 MG/DL (ref 0.66–1.25)
ERYTHROCYTE [DISTWIDTH] IN BLOOD BY AUTOMATED COUNT: 17.6 % (ref 10–15)
FUNGUS SPEC CULT: NORMAL
GFR SERPL CREATININE-BSD FRML MDRD: 39 ML/MIN/1.7M2
GLUCOSE BLDC GLUCOMTR-MCNC: 126 MG/DL (ref 70–99)
GLUCOSE BLDC GLUCOMTR-MCNC: 131 MG/DL (ref 70–99)
GLUCOSE BLDC GLUCOMTR-MCNC: 132 MG/DL (ref 70–99)
GLUCOSE BLDC GLUCOMTR-MCNC: 135 MG/DL (ref 70–99)
GLUCOSE BLDC GLUCOMTR-MCNC: 137 MG/DL (ref 70–99)
GLUCOSE BLDC GLUCOMTR-MCNC: 166 MG/DL (ref 70–99)
GLUCOSE BLDC GLUCOMTR-MCNC: 176 MG/DL (ref 70–99)
GLUCOSE FLD-MCNC: 5 MG/DL
GLUCOSE SERPL-MCNC: 112 MG/DL (ref 70–99)
GRAM STN SPEC: NORMAL
HCT VFR BLD AUTO: 24.3 % (ref 40–53)
HGB BLD-MCNC: 7.9 G/DL (ref 13.3–17.7)
INTERPRETATION ECG - MUSE: NORMAL
LDH FLD L TO P-CCNC: 727 U/L
LYMPHOCYTES NFR FLD MANUAL: 1 %
MAGNESIUM SERPL-MCNC: 2 MG/DL (ref 1.6–2.3)
MCH RBC QN AUTO: 28.6 PG (ref 26.5–33)
MCHC RBC AUTO-ENTMCNC: 32.5 G/DL (ref 31.5–36.5)
MCV RBC AUTO: 88 FL (ref 78–100)
MICRO REPORT STATUS: NORMAL
MICRO REPORT STATUS: NORMAL
MONOS+MACROS NFR FLD MANUAL: 16 %
NEUTS BAND NFR FLD MANUAL: 83 %
NUM BPU REQUESTED: 2
PHOSPHATE SERPL-MCNC: 5.4 MG/DL (ref 2.5–4.5)
PLATELET # BLD AUTO: 140 10E9/L (ref 150–450)
POTASSIUM SERPL-SCNC: 4.9 MMOL/L (ref 3.4–5.3)
PROT FLD-MCNC: 4 G/DL
RBC # BLD AUTO: 2.76 10E12/L (ref 4.4–5.9)
SODIUM SERPL-SCNC: 140 MMOL/L (ref 133–144)
SPECIMEN EXP DATE BLD: NORMAL
SPECIMEN SOURCE FLD: NORMAL
SPECIMEN SOURCE: ABNORMAL
SPECIMEN SOURCE: NORMAL
SPECIMEN SOURCE: NORMAL
TACROLIMUS BLD-MCNC: 6.5 UG/L (ref 5–15)
TME LAST DOSE: NORMAL H
TRANSFUSION STATUS PATIENT QL: NORMAL
WBC # BLD AUTO: 4.3 10E9/L (ref 4–11)
WBC # FLD AUTO: 5038 /UL

## 2017-08-11 PROCEDURE — 87076 CULTURE ANAEROBE IDENT EACH: CPT | Performed by: NURSE PRACTITIONER

## 2017-08-11 PROCEDURE — 83735 ASSAY OF MAGNESIUM: CPT | Performed by: STUDENT IN AN ORGANIZED HEALTH CARE EDUCATION/TRAINING PROGRAM

## 2017-08-11 PROCEDURE — 82247 BILIRUBIN TOTAL: CPT | Performed by: NURSE PRACTITIONER

## 2017-08-11 PROCEDURE — 25000132 ZZH RX MED GY IP 250 OP 250 PS 637: Performed by: STUDENT IN AN ORGANIZED HEALTH CARE EDUCATION/TRAINING PROGRAM

## 2017-08-11 PROCEDURE — 12000024 ZZH R&B TRANSPLANT CRITICAL

## 2017-08-11 PROCEDURE — 83615 LACTATE (LD) (LDH) ENZYME: CPT | Performed by: NURSE PRACTITIONER

## 2017-08-11 PROCEDURE — 25000128 H RX IP 250 OP 636: Performed by: PHYSICIAN ASSISTANT

## 2017-08-11 PROCEDURE — 0W9G3ZX DRAINAGE OF PERITONEAL CAVITY, PERCUTANEOUS APPROACH, DIAGNOSTIC: ICD-10-PCS | Performed by: RADIOLOGY

## 2017-08-11 PROCEDURE — 82150 ASSAY OF AMYLASE: CPT | Performed by: NURSE PRACTITIONER

## 2017-08-11 PROCEDURE — 84100 ASSAY OF PHOSPHORUS: CPT | Performed by: STUDENT IN AN ORGANIZED HEALTH CARE EDUCATION/TRAINING PROGRAM

## 2017-08-11 PROCEDURE — 84157 ASSAY OF PROTEIN OTHER: CPT | Performed by: NURSE PRACTITIONER

## 2017-08-11 PROCEDURE — 25000132 ZZH RX MED GY IP 250 OP 250 PS 637: Performed by: TRANSPLANT SURGERY

## 2017-08-11 PROCEDURE — 27210913 ZZH NUTRITION PRODUCT INTERMEDIATE PACKET

## 2017-08-11 PROCEDURE — 25000128 H RX IP 250 OP 636: Performed by: NURSE PRACTITIONER

## 2017-08-11 PROCEDURE — 89051 BODY FLUID CELL COUNT: CPT | Performed by: NURSE PRACTITIONER

## 2017-08-11 PROCEDURE — 25000132 ZZH RX MED GY IP 250 OP 250 PS 637: Performed by: SURGERY

## 2017-08-11 PROCEDURE — 27210995 ZZH RX 272: Performed by: RADIOLOGY

## 2017-08-11 PROCEDURE — 97116 GAIT TRAINING THERAPY: CPT | Mod: GP

## 2017-08-11 PROCEDURE — 85027 COMPLETE CBC AUTOMATED: CPT | Performed by: STUDENT IN AN ORGANIZED HEALTH CARE EDUCATION/TRAINING PROGRAM

## 2017-08-11 PROCEDURE — 97530 THERAPEUTIC ACTIVITIES: CPT | Mod: GO

## 2017-08-11 PROCEDURE — 25000125 ZZHC RX 250: Performed by: PHYSICIAN ASSISTANT

## 2017-08-11 PROCEDURE — 49083 ABD PARACENTESIS W/IMAGING: CPT

## 2017-08-11 PROCEDURE — 40000193 ZZH STATISTIC PT WARD VISIT

## 2017-08-11 PROCEDURE — 80197 ASSAY OF TACROLIMUS: CPT | Performed by: STUDENT IN AN ORGANIZED HEALTH CARE EDUCATION/TRAINING PROGRAM

## 2017-08-11 PROCEDURE — 87086 URINE CULTURE/COLONY COUNT: CPT | Performed by: PHYSICIAN ASSISTANT

## 2017-08-11 PROCEDURE — 25000132 ZZH RX MED GY IP 250 OP 250 PS 637: Performed by: NURSE PRACTITIONER

## 2017-08-11 PROCEDURE — 27210432 ZZH NUTRITION PRODUCT RENAL BASIC LITER

## 2017-08-11 PROCEDURE — 82945 GLUCOSE OTHER FLUID: CPT | Performed by: NURSE PRACTITIONER

## 2017-08-11 PROCEDURE — 87102 FUNGUS ISOLATION CULTURE: CPT | Performed by: NURSE PRACTITIONER

## 2017-08-11 PROCEDURE — 40000133 ZZH STATISTIC OT WARD VISIT

## 2017-08-11 PROCEDURE — 80048 BASIC METABOLIC PNL TOTAL CA: CPT | Performed by: STUDENT IN AN ORGANIZED HEALTH CARE EDUCATION/TRAINING PROGRAM

## 2017-08-11 PROCEDURE — 87070 CULTURE OTHR SPECIMN AEROBIC: CPT | Performed by: NURSE PRACTITIONER

## 2017-08-11 PROCEDURE — 97530 THERAPEUTIC ACTIVITIES: CPT | Mod: GP

## 2017-08-11 PROCEDURE — 87205 SMEAR GRAM STAIN: CPT | Performed by: NURSE PRACTITIONER

## 2017-08-11 PROCEDURE — 00000146 ZZHCL STATISTIC GLUCOSE BY METER IP

## 2017-08-11 PROCEDURE — 27210732 ZZH ACCESSORY CR1

## 2017-08-11 PROCEDURE — 87075 CULTR BACTERIA EXCEPT BLOOD: CPT | Performed by: NURSE PRACTITIONER

## 2017-08-11 PROCEDURE — 82042 OTHER SOURCE ALBUMIN QUAN EA: CPT | Performed by: NURSE PRACTITIONER

## 2017-08-11 PROCEDURE — 25000132 ZZH RX MED GY IP 250 OP 250 PS 637: Performed by: PHYSICIAN ASSISTANT

## 2017-08-11 PROCEDURE — P9016 RBC LEUKOCYTES REDUCED: HCPCS | Performed by: TRANSPLANT SURGERY

## 2017-08-11 PROCEDURE — 27211039 ZZH NEEDLE CR2

## 2017-08-11 PROCEDURE — 36415 COLL VENOUS BLD VENIPUNCTURE: CPT | Performed by: STUDENT IN AN ORGANIZED HEALTH CARE EDUCATION/TRAINING PROGRAM

## 2017-08-11 PROCEDURE — 27210903 ZZH KIT CR5

## 2017-08-11 RX ORDER — SULFAMETHOXAZOLE AND TRIMETHOPRIM 200; 40 MG/5ML; MG/5ML
10 SUSPENSION ORAL DAILY
Status: DISCONTINUED | OUTPATIENT
Start: 2017-08-11 | End: 2017-08-17

## 2017-08-11 RX ORDER — MULTIVIT WITH MINERALS/LUTEIN
250 TABLET ORAL DAILY
Status: DISCONTINUED | OUTPATIENT
Start: 2017-08-11 | End: 2017-08-22

## 2017-08-11 RX ADMIN — LOPERAMIDE HYDROCHLORIDE 4 MG: 2 SOLUTION ORAL at 08:17

## 2017-08-11 RX ADMIN — SODIUM BICARBONATE 650 MG TABLET 650 MG: at 08:17

## 2017-08-11 RX ADMIN — Medication 1 PACKET: at 13:33

## 2017-08-11 RX ADMIN — PIPERACILLIN AND TAZOBACTAM 3.38 G: 3; .375 INJECTION, POWDER, LYOPHILIZED, FOR SOLUTION INTRAVENOUS; PARENTERAL at 18:35

## 2017-08-11 RX ADMIN — FLUDROCORTISONE ACETATE 0.1 MG: 0.1 TABLET ORAL at 20:28

## 2017-08-11 RX ADMIN — ASCORBIC ACID TAB 250 MG 250 MG: 250 TAB at 08:17

## 2017-08-11 RX ADMIN — PIPERACILLIN AND TAZOBACTAM 3.38 G: 3; .375 INJECTION, POWDER, LYOPHILIZED, FOR SOLUTION INTRAVENOUS; PARENTERAL at 12:12

## 2017-08-11 RX ADMIN — SODIUM BICARBONATE 650 MG TABLET 650 MG: at 20:00

## 2017-08-11 RX ADMIN — Medication 80 MG: at 08:17

## 2017-08-11 RX ADMIN — Medication 5 ML: at 08:17

## 2017-08-11 RX ADMIN — LIDOCAINE HYDROCHLORIDE 15 ML: 10 INJECTION, SOLUTION EPIDURAL; INFILTRATION; INTRACAUDAL; PERINEURAL at 14:43

## 2017-08-11 RX ADMIN — PIPERACILLIN AND TAZOBACTAM 3.38 G: 3; .375 INJECTION, POWDER, LYOPHILIZED, FOR SOLUTION INTRAVENOUS; PARENTERAL at 01:55

## 2017-08-11 RX ADMIN — Medication 5 ML: at 20:32

## 2017-08-11 RX ADMIN — SULFAMETHOXAZOLE AND TRIMETHOPRIM 80 MG: 200; 40 SUSPENSION ORAL at 08:45

## 2017-08-11 RX ADMIN — OXYCODONE HYDROCHLORIDE 5 MG: 5 SOLUTION ORAL at 20:32

## 2017-08-11 RX ADMIN — Medication 220 MG: at 08:18

## 2017-08-11 RX ADMIN — GANCICLOVIR SODIUM 650 MG: 500 INJECTION, POWDER, LYOPHILIZED, FOR SOLUTION INTRAVENOUS at 00:22

## 2017-08-11 RX ADMIN — FLUDROCORTISONE ACETATE 0.1 MG: 0.1 TABLET ORAL at 08:18

## 2017-08-11 RX ADMIN — LOPERAMIDE HYDROCHLORIDE 4 MG: 2 SOLUTION ORAL at 20:32

## 2017-08-11 RX ADMIN — TACROLIMUS 150 MCG/HR: 5 INJECTION, SOLUTION INTRAVENOUS at 15:31

## 2017-08-11 RX ADMIN — LINEZOLID 600 MG: 600 INJECTION, SOLUTION INTRAVENOUS at 22:34

## 2017-08-11 RX ADMIN — MICONAZOLE NITRATE: 20 CREAM TOPICAL at 08:18

## 2017-08-11 RX ADMIN — MICONAZOLE NITRATE: 20 CREAM TOPICAL at 20:00

## 2017-08-11 RX ADMIN — GANCICLOVIR SODIUM 650 MG: 500 INJECTION, POWDER, LYOPHILIZED, FOR SOLUTION INTRAVENOUS at 12:42

## 2017-08-11 RX ADMIN — SODIUM BICARBONATE 650 MG TABLET 650 MG: at 13:33

## 2017-08-11 RX ADMIN — Medication 1 PACKET: at 20:34

## 2017-08-11 RX ADMIN — LINEZOLID 600 MG: 600 INJECTION, SOLUTION INTRAVENOUS at 11:13

## 2017-08-11 RX ADMIN — PANTOPRAZOLE SODIUM 40 MG: 40 TABLET, DELAYED RELEASE ORAL at 08:18

## 2017-08-11 RX ADMIN — PIPERACILLIN AND TAZOBACTAM 3.38 G: 3; .375 INJECTION, POWDER, LYOPHILIZED, FOR SOLUTION INTRAVENOUS; PARENTERAL at 06:19

## 2017-08-11 RX ADMIN — Medication 1 PACKET: at 08:17

## 2017-08-11 RX ADMIN — Medication 5 ML: at 08:18

## 2017-08-11 NOTE — PROGRESS NOTES
Colorectal progress note  August 11, 2017     S: No acute issues overnight, states that abdominal pain has improved     O:     Temp:  [98  F (36.7  C)-99.8  F (37.7  C)] 99.1  F (37.3  C)  Pulse:  [101-107] 101  Heart Rate:  [] 117  Resp:  [12-28] 28  BP: ()/(59-82) 129/75  SpO2:  [85 %-99 %] 97 %        Intake/Output Summary (Last 24 hours) at 08/10/17 1005  Last data filed at 08/10/17 0600    Gross per 24 hour   Intake           1782.4 ml   Output             1015 ml   Net            767.4 ml         Gen: Alert, not oriented  Chest: nonlabored breathing  CV: RRR  Abdomen: soft, minimal distension, minimal tenderness, viable stomas, with output in ileostomy bag  Extremities: without cyanosis or edema     Labs:  Labs reviewed        A/P: 50 yo male with history of liver txp 3/2017, s/p negative exlap, colonoscopy with ascending colon perf, POD 16 of exlap, right colectomy, ileostomy and mucous fistula. S/p retroperitoneal drain placement for retroperitoneal gas suspicious of an abscess.Managed with abx, symptoms and exams improved today.     - Continue IV abx per ID and primary team   - No indications for surgical management at this time     Seen and discussed with Colorectal fellow     Karla Kraus, PGY-1  [phone]

## 2017-08-11 NOTE — PROGRESS NOTES
Interventional Radiology Intra-procedural Nursing Note    Patient Name: Camacho Bhagat  Medical Record Number: [id number]  Today's Date: August 11, 2017    Start Time: 1420  End of procedure time: 1435  Procedure: paracentesis  Report given to: Jim RN  Time pt departs:  1500    Other Notes: pt from 7A to IR 6. Pt positioned and prepped per procedure protocol. Lido 15 cc. 400 cc ascites fluid obtained with 3 specimens sent to lab.     DELROY RODAS

## 2017-08-11 NOTE — PLAN OF CARE
Problem: Individualization  Goal: Patient Preferences     Afebrile, HR intermittently tachy, other VSS, continues on 2 liters nasal cannula, saturating high 90s%  Patient denies pain  Blood [id number]=466, 176, 135  Patient intermittently  confused, has VPM, and bed alarm in place.  Mucous fistula dressing and sacral dressing remain intact (sacral dressing due to be changed on 8/13).  Crowe patent with fair output (culture sent), ileostomy with moderate output, NPO, TF reinitiated per order and currently infusing at 15ml/hr.  Patient had paracentesis in IR with 400ml removed (site remains CDI no bleeding or hematoma noted). Right abdominal drain remains to gravity (155ml out) and irrigated per order.  Prograf gtt continues to infuse at 7.5ml/hr (150mcg/hr)   Scheduled meds given as ordered.  Continue to monitor, treat as ordered, notify team with changes.  Will initiate insulin gtt as needed per order (blood glucose>150)  Tube feeding to be increased by 10ml/hr every 8hours to goal rate of 65ml/hr - next adjustment due at midnight to 25ml/hr - as patient tolerates.

## 2017-08-11 NOTE — PROGRESS NOTES
Immunosuppression Note:    Camacho Bhagat is a 51 year old male who is seen today  for immunosuppression management     I, Jovan Tran MD, I have examined the patient with the resident/PA/Fellow, discussed and agree with the note and findings.  I have reviewed today's vital signs, medications, labs and imaging. I reviewed the immunosuppression medications and levels. 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 Concern for CMV colitis with low count CMV viremia/GI PTLD and subsequently underwent colonoscopy on 7/21, random biopsies obtained.  On 7/25/2017 patient had respiratory distress, abdominal compartment syndrome and EJ with oliguria. Broad spectrum antibiotics were started.  He was intubated and had a paracentesis with 5L removed. He did not required pressors.  CT was obtained which showed a new large heterogeneous right sided retroperitoneal gas and air tracking along duodenum.  No contrast extravasation.  No intraperitoneal air noted. Colorectal surgery was consulted. Initial conservative management with bowel rest and IV abx. Pt continued to spike fevers despite IV abx.  Now s/p Exploratory laparotomy, right angelique-colectomy, end ileostomy, mucus fistula, partial omentectomy on 7/26.    Graft Function: Good function. LFTs acceptable (checking every Monday, Thursday).   Immunosuppression Management:   CellCept discontinued (6/27/17) due to neutropenia.   Prograf goal ~5-6 due to sepsis.  Unable to obtain  detectable level with suspension.  Level 3.1 on tacro gtt, today's level pending.  Will discuss with pharmacy.  Cardiorespiratory: Stable respiratory status, NLB on RA  Hematology: Pancytopenia on admission, acute on chronic, secondary to medications (MMF, bactrim, valcyte). Improved with holding medications and neupogen. 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  Continue to hold MMF and bactrim.   -Anemia- Hemoglobin stable. WBC stable. Last blood transfusion on 7/26.   GI:   -Neutropenic colitis on admission. Colonoscopy 7/21. Biopsy: resolving injury secondary to possible drug induced vs infectious.   -Bowel perforation: 7/25 CT scan abd/pelvis, po contrast, showed a new large heterogeneous right sided retroperitoneal gas and air tracking along duodenum.  No contrast extravasation.  No intraperitoneal air noted. Colorectal surgery consulted.  Continued to spike high grade temperature despite IV antibiotics therefore patient taken to OR. s/p Exploratory laparotomy, right angelique-colectomy, end ileostomy, mucus fistula, partial omentectomy on 7/26. Findings no neida perforation, phlegmon/inflammationright colon. Colon pathology suggested colon perforation, negative for malignancy; CMV stain positive.    -Diet:  May resume tube feeds.  -High output  ileostomy: Decreased output yesterday, 250 cc due to tube feeds being on HOLD.  Goal ileostomy output ~ 1000 cc in 24 hrs.   Loperamide 2mg q6H, fiber TID, Lomotil BID 5 mg.  R/o peritonitis or bowel perforation:  CT A/P 8/9 showed a persistent gas/fluid collection RLQ, peritonitis, some free air but no evidence of contrast extravasation.  Diagnostic para today in IR.  Fluid/Electrolytes/Renal:   -Dehydration:  Tachycardic  -EJ: on admission, likely sec to high intraabdominal pressures and ischemic ATN sec to sepsis. Improved, but now Cr increasing again.  Concern for sepsis.  Nephrology consulted.  -Hypernatremia: resolved with free  water.    -Hyperkalemia: on florinef, increased 8/9.  K cocktail discontinued.  Avoid kayexalate due to recent bowel perforation.    Endocrine: DM type 2. Endocrine consulting for glycemic management.  On lantus/NPH.  BG trending up, concerning for infection.  Infectious disease:  ID following.  Tmax 99.7F.  WBC 4.3.  -Fever:  Procalcitonin elevated.  CT C/A/P 8/9 showed a persistent gas/fluid collection RLQ, peritonitis, some free air but no evidence of contrast extravasation.  IR placed a drain, gram stain negative.  UA not suggestive of UTI, blood cultures NGTD.  Concern for evolving sepsis due to increasing Cr, tachycardia, low grade fevers, AMS, increasing blood glucose.  Started empiric linezolid and zosyn on 8/9.  Diagnostic paracentesis today.  -CMV viremia: Primary CMV infection.  CMV colitis per pathology, peak serum 7/15 4225 IU/ml.  Continue IV ganciclovir. Follow CMV PCR weekly, now <137 x 2 weeks  -EBV viremia:  Peak serum 406,697 copies/ml on 7/18.  Down to 6864 as of 8/1.  Check weekly.  -R/o SBP or abscess:  8/5 paracentesis with 250ml removed.  , cx negative.    Resolved issues:  -Severe sepsis: On admission, secondary to right colon phlegmon. Meropenem/daptomycin/micafungin tx x 10 days completed on 8/3. S/p right angelique-colectomy.  Path: CMV stain +, perforation of colon noted.  7/5 Fluid culture + staph capitis broth only (contamination).   Neuro: Toxic metabolic encephalopathy secondary to infection, prolonged hospital stay.  Now delirious.  Vascular:  Evidence of microvascular injury in digits (toes) this is most likely due to injury while patient was on pressor therapy with sepsis.  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   Activity: Deconditioned due to prolong hospital course. Daily PT/OT, encourage pt to be OOB to chair. Encourage pulmonary toilet.   Prophylaxis: DVT-Heparin SQ  Disposition: 7A.     Medical Decision Making: Medium    PILLO/Fellow/Resident  Provider:  Lori Willingham PA-C [id number]    Faculty: Jovan Tran MD    __________________________________________________________________  Transplant History:.  3/4/2017 DD OLT with Dr. Tran (Liver), Postoperative day: 160     Interval History:   Overnight events:  Less confused this AM.     ROS:   A 10-point review of systems was negative except as noted above.    Meds:    ascorbic acid  250 mg Oral Daily     sulfamethoxazole-trimethoprim  10 mL Oral Daily     miconazole with skin protectant   Topical BID     sodium bicarbonate  650 mg Per NG tube TID     linezolid  600 mg Intravenous Q12H     piperacillin-tazobactam  3.375 g Intravenous Q6H     sodium chloride (PF)  10 mL Irrigation Q8H     fludrocortisone  0.1 mg Oral BID     multivitamin CF formula  5 mL Per Feeding Tube Daily     zinc sulfate  220 mg Per Feeding Tube Daily     diphenoxylate-atropine  5 mL Oral BID     sodium chloride (PF)  3 mL Intracatheter Q8H     ganciclovir (CYTOVENE) intermittent infusion  7.5 mg/kg Intravenous Q12H     loperamide  4 mg Oral or Feeding Tube 4x Daily     insulin aspart  1-12 Units Subcutaneous Q4H     insulin aspart   Subcutaneous TID w/meals     protein modular  1 packet Per Feeding Tube TID     aspirin  80 mg Oral Daily     pantoprazole  40 mg Oral or Feeding Tube Daily     sodium chloride (PF)  3 mL Intracatheter Q8H       Physical Exam:     Admit Weight: 94.2 kg (207 lb 11.2 oz) (SCALE 2)    Current vitals:   /86 (BP Location: Left arm)  Pulse 105  Temp 98.7  F (37.1  C) (Oral)  Resp 22  Ht 1.829 m (6')  Wt 92.6 kg (204 lb 1.6 oz)  SpO2 98%  BMI 27.68 kg/m2    Vital sign ranges:    Temp:  [97.8  F (36.6  C)-99.7  F (37.6  C)] 98.7  F (37.1  C)  Pulse:  [105-109] 105  Heart Rate:  [101-113] 101  Resp:  [16-30] 22  BP: (135-169)/(50-86) 152/86  SpO2:  [92 %-98 %] 98 %  Patient Vitals for the past 24 hrs:   BP Temp Temp src Pulse Heart Rate Resp SpO2   08/11/17 0751 152/86 98.7  F (37.1  C) Oral - 101  22 98 %   08/11/17 0537 158/78 98.3  F (36.8  C) Axillary - 104 24 97 %   08/11/17 0422 168/85 98.7  F (37.1  C) Axillary - 105 24 98 %   08/11/17 0323 146/76 98.6  F (37  C) Axillary - 105 24 97 %   08/11/17 0251 169/79 98.1  F (36.7  C) Axillary - 106 24 97 %   08/11/17 0154 160/74 98.8  F (37.1  C) Axillary - 106 28 95 %   08/11/17 0051 151/67 98.8  F (37.1  C) Axillary - 110 28 97 %   08/11/17 0013 144/74 98.2  F (36.8  C) Axillary - 104 28 96 %   08/10/17 1923 135/50 99.7  F (37.6  C) Oral 105 105 30 97 %   08/10/17 1749 - - - - - 30 -   08/10/17 1614 146/76 97.8  F (36.6  C) Oral 109 109 16 92 %   08/10/17 1400 - - - - 111 28 95 %   08/10/17 1134 142/74 98.6  F (37  C) - - 113 24 93 %     General Appearance: NAD  Skin: normal, warm, dry  Heart: sinus tach 100-110  Lungs: productive cough, diminished bilateral  Abdomen: soft, rounded, more tender. Ileostomy with brown output. Mucus fistula covered. Midline incision, c/d/i.   : No vazquez.   Extremities: No edema.  Necrotic blackened areas on right 1st & 2nd toes and left 2nd toe.  Neurologic: A&O x 2, waxes and wanes.       Data:   CMP    Recent Labs  Lab 08/11/17  0650 08/10/17  2135  08/10/17  0704  08/08/17  0600     --   --  140  < > 136   POTASSIUM 4.9 5.1  < > 5.5*  < > 5.8*   CHLORIDE 114*  --   --  112*  < > 111*   CO2 19*  --   --  18*  < > 17*   *  --   --  121*  < > 152*   BUN 64*  --   --  61*  < > 55*   CR 1.82*  --   --  1.70*  < > 1.18   GFRESTIMATED 39*  --   --  42*  < > 65   GFRESTBLACK 47*  --   --  51*  < > 78   JACOB 8.5  --   --  8.5  < > 8.2*   MAG 2.0  --   --  1.9  < > 2.0   PHOS 5.4*  --   --  4.6*  < > 4.6*   ALBUMIN  --   --   --  1.9*  --  2.1*   BILITOTAL  --   --   --  2.2*  --  0.5   ALKPHOS  --   --   --  136  --  172*   AST  --   --   --  36  --  28   ALT  --   --   --  26  --  25   < > = values in this interval not displayed.  CBC    Recent Labs  Lab 08/11/17  0650 08/10/17  2135 08/10/17  0704   HGB 7.9* 6.3* 7.1*  "  WBC 4.3  --  6.1   *  --  129*     COAGS    Recent Labs  Lab 08/05/17  0949   INR 1.19*      Urinalysis  Recent Labs   Lab Test  08/10/17   1752  08/08/17   1600   06/14/17   1508   04/11/16   1345   COLOR  Yellow  Yellow   < >   --    < >  Yellow   APPEARANCE  Slightly Cloudy  Slightly Cloudy   < >   --    < >  Clear   URINEGLC  Negative  Negative   < >   --    < >  30*   URINEBILI  Small   This is an unconfirmed screening test result. A positive result may be false.  *  Negative   < >   --    < >  Negative   URINEKETONE  Negative  Negative   < >   --    < >  Negative   SG  1.012  1.010   < >   --    < >  1.016   UBLD  Negative  Negative   < >   --    < >  Small*   URINEPH  5.0  5.0   < >   --    < >  5.0   PROTEIN  30*  30*   < >   --    < >  30*   NITRITE  Negative  Negative   < >   --    < >  Negative   LEUKEST  Negative  Negative   < >   --    < >  Negative   RBCU  0  3*   < >   --    < >  1   WBCU  10*  7*   < >   --    < >  1   UTPG   --    --    --   1.55*   --   0.41*    < > = values in this interval not displayed.          7/21/2017   SPECIMEN(S):   A: Colon biopsy, ascending   B: Colon biopsy, descending     FINAL DIAGNOSIS:   A. COLON BIOPSY, ASCENDING:   - Colonic mucosa with no significant histologic abnormality   - Negative for intraepithelial lymphocytosis or deposition of   subepithelial thick collagenous band     B. COLON BIOPSY, DESCENDING:   - Crypt architecture distortion, consistent with healed prior injury   - Negative for active inflammation or dysplasia   - Negative for intraepithelial lymphocytosis or deposition of   subepithelial thick collagenous band   - Please, see comment     COMMENT:   Specimen B, \"descending colon\" features lamina propria edema, slight   variation in crypt sizes and shapes and occasional crypt drop-out.  This   may indicate a resolving injury which could be drug-induced or   infectious.     CT scan 7/25/2017:   IMPRESSION:   1. New large heterogeneous area " of right-sided retroperitoneal gas  which is highly concerning for an infectious process. Given the  history of prior neutropenic colitis and biopsies, there could also be  a component of stool from a ruptured viscus, despite the lack of  surrounding bowel loops and no extraluminal oral contrast. Surgical  consultation is recommended.      2. Bibasilar atelectasis versus consolidation with small bilateral  pleural effusions.     3. Extensive mesenteric and soft tissue edema with large volume  ascites. Numerous mesenteric and retroperitoneal lymph nodes which are  presumably reactive.     4. Postsurgical changes of liver transplant.     [Urgent Result: Retroperitoneal gas]     Finding was identified on 7/25/2017 5:34 PM.      Dr. Santoyo was contacted by Dr. Atkins at 7/25/2017 5:37 PM and  verbalized understanding of the urgent finding.      I have personally reviewed the examination and initial interpretation  and I agree with the findings.     LUCI HOROWITZ, [facility]

## 2017-08-11 NOTE — PLAN OF CARE
Problem: Goal Outcome Summary  Goal: Goal Outcome Summary  PT-7A: Fair tolerance for therapy this day.  Deficits towards independence with functional mobility included weakness, cognition, decreased activity tolerance, pain.  Performed bed mobility with Min A x 2, transfers with Min A, and amb with FWW + CGA short distances.     Recommend discharge to TCU.

## 2017-08-11 NOTE — CONSULTS
Patient is on IR schedule 8/11/2017 for a dx and therapeutic paracentesis.   Labs WNL for procedure.  No NPO required.  Consent will be done prior to procedure.    [place] [phone] for estimated time of procedure.     Case discussed with KJ Cortés, [place], APRN  Interventional Radiology  Phone: [phone]  Pager: [phone]

## 2017-08-11 NOTE — PLAN OF CARE
Problem: Goal Outcome Summary  Goal: Goal Outcome Summary  OT/7A: Pt with decreased confusion this date, increased command following but continues to require frequent redirection. Facilitated bed > chair transfer, due to pain pt unable to complete bed mobility supine > sit with assist x 2, utilized universal sling to dependently sling > chair. Pt min/mod A x 2 for log roll in bed to place sling with VCs. Pt declining ADLs when seated upright, educated pt on benefits of sitting upright for increased pulmonary hygiene and activity tolerance as well as to decrease back pain, pt agreeable to sit upright for an hour.     Recommendation: TCU as pt is limited by decreased strength/activity tolerance, pain, post-surgical precautions, and cognition impacting pt's independence in ADLs/IADLs.

## 2017-08-11 NOTE — PROGRESS NOTES
"[facility]  Transplant Infectious Diseases Progress Note      Camacho Bhagat MRN# [id number]   YOB: 1965 Age: 51 year old   Date of Service: 8/11/2017        Assessment and Recommendations:   Recommendations:  - Continue empiric Zosyn and linezolid (for history of VRE carriage) for the right retroperitoneal abscess and apparent secondary peritonitis at least over the weekend.  - Without evidence of active VRE infection lately, a trial of discontinuing the linezolid and treating with Zosyn alone would be advisable next week.  - Would continue the Zosyn indefinitely for now (although if he continues to improve, would favor eventually trying a switch to oral levofloxacin / metronidazole) on the presumption that he has an unclosed persistent enterostomy with 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  (Would appreciate some longer term speculation with Transplant Surgery as to his underlying problem and plan down the road.)  - The 8/9/17 Clostridium isolate is likely well-covered by Zosyn -- additional antibiotic therapy for that is not needed.  - Decrease the IV ganciclovir to \"high-maintenance\" dose at 7.5 mg / kg daily, as discussed.  - Continue to monitor blood CMV PCR assays weekly (next due 8/17/17).  - Continue TMP-SMX prophylaxis.  - Repeat the EBV blood PCR viral load check on ~ 8/15/17.    Case discussed with the Transplant Surgery team.  Transplant ID will follow with you but will not see over the weekend unless paged.  Please page me if questions arise.    Edwin Del Toro MD  Pager [phone]    Assessment:  A 51 year old gentleman immunosuppressed (tacrolimus; MMF on hold; received basiliximab initial induction) s/p liver transplant on 3/4/17 for CASTAÑEDA who was admitted 7/4/17 with colitis and underwent 7/26/17 exploratory laparotomy after forming a RUQ phlegmon possibly due to colonic perforation.  The initial admission diagnosis was neutropenic colitis, but new primary " seroconversion CMV viremia was discovered on 7/10/17.  He had persistent fevers from 7/10 - 15/17 and again from 7/21 - 8/4/17, was afebrile for three+ days, but has spiked renewed fevers (up to 100.7 degrees) since 8/7/17 late evening and is looking septic with increased malaise and abdominal pain, obtundation, increased delirium, rising inflammatory markers, and a higher peripheral WBC.  Repeat 8/9/17 abdominal CT scan showed right sided free air.  A RUQ retroperitoneal abscess drain was placed by IR on 8/9/17 and broad-spectrum antibiotic therapy resumed -- since then he has improved.    ID issues:    - Renewed peritoneal sepsis / RUQ abdominal abscess:  Off antimicrobails (except TMP-SMX and Valcyte) for five days (8/4 - 8/17, after completing a ten day 7/25 - 8/3/17 empiric course of meropenem / daptomycin / micafungin) he again developed sepsis with a markedly increased procalcitonin, increased serum CRP, increased abdominal pain, and worsened confusion / obtundation.  8/9/17 chest / abdomen CT scan demonstrated little evidence of new infection above the diaphragm, but new infra-hepatic free air and an unchanged (versus the 8/4/17 abdominal CT scan) right retroperitoneal RUQ multifocal gas collection suggestive for an abscess.  Interventional Radiology placed a drain into that abscess 8/9/17 afternoon -- cultures have grown only a Clostridium species in the anaerobic broth culture.  Other potential sites for his sepsis beyond the abdomen were lacking and blood cultures remain negative.  On resumed empiric antibiotic therapy with Zosyn / linezolid since 8/9/17, his sepsis has resolved and he is again looking better with less obtundation.  All this has occured is in the wake of an exploratory laparotomy on 7/26/17 with a right angelique-colectomy / end ileostomy / mucous fistula / partial omentectomy -- intra-operatively no overt perforation was seen at that time, but right colitis was observed with a probable  "intra-adominal abscess or phlegmon (thought possibly due to a colonic perforation which was seen on the excised colonic histopathology).was found in BALs, and 8/5/17 ascites studies / cultures remain unremarkable.  While the sepsis seems presently controlled with drainage and broad-spectrum empiric antibiotics, his course, ongoing ascites fluid cell counts evidence of peritonitis (although ascites fluid cultures are suppressed on antibiotics), and the presence of Clostridium in the 8/9/17 anaerobic drainage culture implies that he very well may have an ongoing enterostomic leak into his peritoneum.  A long-term plan for dealing with that likelihood is eventually needed.  In the meanwhile, whether he still needs empiric linezolid therapy (or any VRE coverage) is open to question.    - CMV viremia /  CMV colitis:  He presented with a \"detected\" low level CMV viremia (detected < 137 IU / ml) upon admission and developed a progressively worsening viremia after stopping his Valcyte prophylaxis.  Valcyte was re-started on 7/15/17 when the blood CMV PCR viral load was 4,225 IU / ml (3.6 log) and that was switched to ganciclovir on 7/20/17 with a viral load of 1,906 IU /ml (3.3 log). After one week of GCV treatment, the blood CMV PCR dropped to 290 IU / ml (2.5 log) on 7/27/17 and decreased further to < 137 IU / ml (< 2.1 log) on 8/3/17 (a > 1 log drop in the viremia over two weeks).  The CMV immunostain of colonic tissue biopsy was positive; confirming a CMV etiology of his colitis.  Because of an initial slow pace of viremia decline, he had been treated with supra-high dose ganciclovir (doubled to 10 mg / kg / dose BID) for the possibility of ganciclovir resistance, but with the improving viral load on 8/3/17, his ganciclovir dose was reduced to 7.5 mg / kg IV BID on 8/4/17 PM.  On that, the 8/10/17 repeat CMV viral load was again undetectable, so he was decreased to \"high maintenance\" ganciclovir dosing at 7.5 mg / kg / " day on 8/11/17.  Weekly blood CMV PCR viral load checks (next due to be checked 8/17/17) will continue to make certain they remain suppressed at this maintenance dose.  (A CMV drug resistance genotype assay ordered on 7/27/17 went astray, so we have no genotypic data to help with decisions.)     - Improving encephalopathy:  His delirium and somnolence seem to vary with the state of his overall health and abdominal sepsis, so likely represent toxic-metabolic encephalopathy associated with sepsis and also somewhat with medications side effects superimposed on chronic hepatic encephalopathy.  A 7/20/17 brain MRI scan was unremarkable and a repeat brain MRI has not yet been deemed needed.  An 8/5/17 serum cryptococcal antigen assay was negative.  The likelihood of any CNS infection at this time is exceeding low and additional CNS evaluation is presently not needed.    - Improved EBV viremia:  Initially discovered to have EBV viremia at 406,697  / ml on 7/18/17.  With decreased immunosuppression, that had fallen to 6,864  / ml by 8/1/17.  Checking blood EBV PCRs every couple weeks is reasonable for now.  Random colonic biopsies from the 7/21/17 colonoscopy and 7/26/17 hemicolectomy histopathology results were not suggestive for PTLD.    - Multifactorial pancytopenia, resolved neutropenia:  Was likely due to medication and CMV viremia.  Neutropenia has resolved.    - S/p liver transplant:  Continue on tacrolimus; but MMF was held 6/27/17 in the context of sepsis and pancytopenia.    Old ID issues:  - Resolved EJ:  Was due to severe sepsis.  Creatinine is now down to < 1.   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  Nevertheless, he received a full ten day (7/25 - 8/3/17) daptomycin course for severe sepsis in the setting of colonization with VRE.   -  Staphylococcus capitis  in ascitic fluid 7/5/17:  Was culture contamination.   - Rhinovirus in BAL 7/15/2017:  Likely to be due to chronic shedding, since his main presentation was abdominal, not respiratory.    Other ID issues:  - PCP prophylaxis: TMP-SMX was held in 6/17 due to neutropenia, but resumed 8/4/17.  - Serostatus:  CMV D+/R-, EBV D+/R-, HSV1?/2?, VZV ?.  Presently on IV ganciclovir.  - Immunization status: up-to-date.  - Gamma globulin status: >1660 as of 7/24/2017  - Isolation: Contact isolation for a history of VRE carriage.    Interval History:  Mr. Bhagat was febrile to 100.7 degrees 8/7 - 8/9/17 with a rising procalcitonin / CRP and worsening obtundation, so repeat 8/9/17 chest / abdominal CT scan was performed showing persistent (versus the 8/4/17 abdominal CT scan) right retroperitoneal RUQ multifocal gas collection with new intraperitoneal free air below the liver.  Interventional Radiology placed a right retroperitoneal drain into a presumed abscess on 8/9/17 and he has been on empiric Zosyn plus linezolid (for his history of VRE) since 8/9/17 (as well as ongoing IV ganciclovir since 7/20/17 and TMP-SMX prophylaxis since 8/4/17).  On that, he is now again afebrile (T max 99.7 degrees) over the past 36 hours and his prior peripheral WBC rise to 8 - 9K (on 8/8 - 9/17) has decreased back to his baseline range at 4.3 this morning.  His abdominal pain is improved today although he still has moderate ascites by exam and by 8/10/17 abdominal ultrasound.  His obtundation / confusion have also improved and he is more interactive (although still not very oriented) and appears less toxic.  He is no longer moaning.  The 8/9/17 drainage aerobic culture is without growth to date, but the 8/9/17 drainage anaerobic culture is growing a Clostridium species in broth (at 48 hours incubation).  Review of systems remains difficult to obtain, but he lacks apparent resting dyspnea, cough, recent nausea, new rash, headache, limbs pain, or  other new obvious complaints today. On ongoing ganciclovir 7.5 mg PO BID his 8/10 and 8/3/17 blood CMV PCR viral loads have now both been < 137 IU / ml on IV ganciclovir, so his dose was decreased down to (high-) maintenance dosing at 7.5 mg / kg daily today.     [facility] Infectious Disease Illness (copied from the 8/4/17 Transplant ID Note):   A 51 year old gentleman with PMH of DM, HTN, fibromyalgia, previous DVT with IVC filter,  s/p liver transplant secondary to ESLD due to CASTAÑEDA on 03/04/2017, CMV D+/R-, EBV D+/R-, who was admitted on 07/04 due to neutropenic enterocolitis starting empiric therapy with zosyn + flagyl + vancomycin + micafungin being transferred to the ICU, requiring exploratory laparotomy + wash out for neutropenic enterocolitis on 07/04, reporting inflamed cecum and ascending colon, having a second look on 07/05 finding improvement of the inflammation and performing closure of abdominal incision; culture resulted S. Capitis considered a contaminant. Valcyte was stopped since admission. On 07/06, flagyl + vancomycin + micafungin were stopped and zosyn changed to ertapenem. As per ID note from 07/06, recommended to switch back ertapenem to zosyn since thrombocytopenia was seen before zosyn treatment, pancytopenia possibly related to medication (MMF, bactrim, valcyte, initially seen at the beginning of June) and recommended to monitor CMV PCR weekly ( on 07/03: positive < 137; before on 06/26 was ND). Additionally, on 07/12 a BAL was repeated and showed VRE/CONS/P putida/C. Albicans, all were considered a colonizers, and primary team continued with ertapenem, held MMF, bactrim, valcyte). On 07/13, it was recommended to re-start valcyte since his CMV PCR was 145 and counts improved. Patient persisted febrile, with evident ascitis tapped but culture resulted negative. Course complicated with thrombosis of the right arterial artery with ischemia to the tip of the ischemic finger. On 07/15, BAL was  repeated and was positive for rhinovirus. Valcyte was re-initiated on 07/15 due to CMV PCR: 4225. PAtient was discharged from the ICU on 07/17. Also, 14 days of ertapenem were completed on 07/18 and EBV PCR was taken on 07/18 and resulted [id number] concerning of PTLD since patient persisted febrile with ascitis and colitis. On 07/20, ZAKIA was switched to GCV due to worsening level of 1906. Patient became encephalopathic but MRI resulted negative. On 07/21 a colonoscopy was performed and showed normal colonic mucosa; random biopsies were taken. On 07/25, patient deteriorated clinically presenting respiratory failure requiring intubation, so was transferred to ICU and started empiric treatment with meropenem + daptomycin + micafungin, and GCV dose was increased to 10 mg BID. A CT abdomen was repeated and showed retroperitoneal air so underwent EL + lysis of adhesions + right hemicolectomy + ileostomy + partial omentectomy  due to ascending colitis (no neida perforation or ischemia). On 07/27 CMV PCR resulted 290. Patient persists with intermittent fever and encephalopathy.    Transplants:  3/4/2017 (Liver)    Review of Systems:  CONSTITUTIONAL:  New fever to 100.7 degrees the last two nights.  Previously, no fever, chills, or sweats 8/4 - 7/17.  Chronic fatigue and anorexia.  Increased malaise.  EYES: No eye pain, visual changes, or scleral icterus.  ENT:  No rhinorrhea, sinus pain, otalgia, hearing loss, tinnitus, sore throat, or oral pain.  Left ear lobe pressure ulcer.  LYMPH:  No edema.  RESPIRATORY:  No cough, increased sputum, or dyspnea (on intermittent low flow oxygen).  CARDIOVASCULAR:  No chest pain, palpitations.  GASTROINTESTINAL:  Worsened abdominal pain.  Significant ascites.  S/p liver transplant.  Dysphagia.  Liquid stool in his ileostomy.  No nausea, vomiting, or constipation.  GENITOURINARY:  Improved EJ.  Intermittent urinary incontinence.  No dysuria.  HEME:  Chronic anemia.  Improved neutropenia.  No  easy bruising.  ENDOCRINE:  Type 2 diabetes mellitus on insulin.  MUSCULOSKELETAL:  Necrotic toe tips.  Generally deconditioned.  Coccygeal decub.  No new focal myalgias / arthralgias.  SKIN:  No rash or pruritus.  NEURO:  Increased obtundation, more confused, weak memory.  No headache.  PSYCH:  Negative.    Past Medical / Surgical History:  Past Medical History:   Diagnosis Date     Cancer (H)      Depressive disorder, not elsewhere classified      Esophageal reflux      Fibromyalgia 1/2009    dx with Dr Benitez( Rheum)     History of thrombophlebitis      Osteoarthritis      Other acute embolism veins 11/01    Deep vein thrombophlebitis, filter placed     Other and unspecified hyperlipidemia      Other chronic nonalcoholic liver disease     Fatty liver      Other testicular hypofunction      Pneumonia, organism 10-01    Included ARDS, sepsis, and  acute renal failure; hospitalized     Rheumatoid arthritis(714.0)      Type II or unspecified type diabetes mellitus without mention of complication, not stated as uncontrolled 11/01    Managed by endocrinology     Unspecified essential hypertension 11/01    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     Unspecified sleep apnea     Uses BiPAP     Past Surgical History:   Procedure Laterality Date     BENCH LIVER N/A 3/4/2017    Procedure: BENCH LIVER;  Surgeon: Jovan Tran MD;  Location: [facility] OR      NONSPECIFIC PROCEDURE      tracheostomy     C NONSPECIFIC PROCEDURE      repair of deviated septum     C NONSPECIFIC PROCEDURE  2007    Rt knee arthroscopy     C TOTAL KNEE ARTHROPLASTY  2008    Right knee arthroscopy     CHOLECYSTECTOMY       COLONOSCOPY N/A 7/21/2017    Procedure: COMBINED COLONOSCOPY, SINGLE OR MULTIPLE BIOPSY/POLYPECTOMY BY BIOPSY;  Colonoscopy;  Surgeon: Izaiah Montes MD;  [facility]     ESOPHAGOSCOPY, GASTROSCOPY, DUODENOSCOPY (EGD), COMBINED N/A 8/4/2016    Procedure: COMBINED ESOPHAGOSCOPY, GASTROSCOPY, DUODENOSCOPY (EGD),  BIOPSY SINGLE OR MULTIPLE;  Surgeon: Trent Pederson MD;  Location: [facility]     LAPAROTOMY EXPLORATORY N/A 2017    Procedure: LAPAROTOMY EXPLORATORY;  Exploratory Laparotomy, washout;  Surgeon: Tip Zhang MD;  Location: [facility] OR     LAPAROTOMY EXPLORATORY N/A 2017    Procedure: LAPAROTOMY EXPLORATORY;  Exploratory Laparotomy, Washout with closure.;  Surgeon: Tip Zhang MD;  Location: [facility] OR     LAPAROTOMY EXPLORATORY N/A 2017    Procedure: LAPAROTOMY EXPLORATORY;  Exploratory Laparotomy, Right angelique-colectomy, end ileostomy, mucosal fistula, partial omentectomy;  Surgeon: Sara Dinh MD;  Location: [facility] OR     TRANSPLANT LIVER RECIPIENT  DONOR N/A 3/4/2017    Procedure: TRANSPLANT LIVER RECIPIENT  DONOR;  Surgeon: Jovan Tran MD;  Location: [facility] OR     Current Scheduled Medications:    ascorbic acid  250 mg Oral Daily     sulfamethoxazole-trimethoprim  10 mL Oral Daily     ganciclovir (CYTOVENE) intermittent infusion  7.5 mg/kg Intravenous Q24H     miconazole with skin protectant   Topical BID     sodium bicarbonate  650 mg Per NG tube TID     linezolid  600 mg Intravenous Q12H     piperacillin-tazobactam  3.375 g Intravenous Q6H     sodium chloride (PF)  10 mL Irrigation Q8H     fludrocortisone  0.1 mg Oral BID     multivitamin CF formula  5 mL Per Feeding Tube Daily     zinc sulfate  220 mg Per Feeding Tube Daily     diphenoxylate-atropine  5 mL Oral BID     sodium chloride (PF)  3 mL Intracatheter Q8H     loperamide  4 mg Oral or Feeding Tube 4x Daily     insulin aspart   Subcutaneous TID w/meals     protein modular  1 packet Per Feeding Tube TID     aspirin  80 mg Oral Daily     pantoprazole  40 mg Oral or Feeding Tube Daily     sodium chloride (PF)  3 mL Intracatheter Q8H     Physical Examination:  Vital sign ranges over the past 24 hours:  Temp:  [97.3  F (36.3  C)-99.7  F (37.6  C)] 97.6  F (36.4  C)  Pulse:  [105] 105  Heart Rate:  []  95  Resp:  [20-30] 24  BP: (133-169)/(50-91) 151/85  SpO2:  [95 %-99 %] 98 %    Intake/Output Summary (Last 24 hours) at 08/11/17 1705  Last data filed at 08/11/17 1700   Gross per 24 hour   Intake           2283.2 ml   Output             1705 ml   Net            578.2 ml     Physical Examination:  GENERAL:  Awakable, confused, fatigued-appearing, WDWN 51 year old man supine in bed in NAD.  EYES:  EOMI, anicteric sclerae.  ENT:  No otorrhea.  NJ tube present.  Nasal cannula present.  No anterior oral lesion.  NECK:  Supple.  LYMPH:  No cervical lymphadenopathy.  LUNGS:  Few bilaterally scattered coarse rhonchi.  CARDIOVASCULAR:  Regular rate and rhythm, tachycardia resolved, normal S1, S2, without murmur, gallop, or rub.  ABDOMEN:  Normal bowel sounds, soft, slight generalized tenderness, mildly distended, midline stapled wound lacks inflammation with dressed superior mucous fistula, RLQ ileostomy with liquid stool, right TABITHA drain with serous drainage.  :  Crowe with hardik urine.  EXTREMITIES:  Distally warm, no edema,  ischemic right hallux and right second toe, also ischemic tip of left second toe, and right index, no ulcers.  Right PICC line site lacks inflammation.  NEUROLOGIC:  Responding, oriented x 1.5, moves extremities x 4.    Inflammatory Markers    Recent Labs   Lab Test  08/09/17   0711  08/07/17   0549  08/06/17   0633  08/05/17   0616  07/05/17   1810  03/05/17   0358  11/23/16   0813  11/16/16   0706   08/15/11   1151  04/11/11   1209  01/03/11   1246  02/15/10   1232   SED   --    --    --    --    --    --   45*   --    --   11  14  17*  25*   CRP  190.0*  130.0*  130.0*  140.0*  354.0  20.0*  58.0*  59.1*   < >  11.1*  9.0*  11.7*  17.5*    < > = values in this interval not displayed.     Immune Globulin Studies     Recent Labs   Lab Test  07/24/17   0705  08/15/11   1243   IGG  1660*  1160   IGG1   --   734   IGG2   --   294   IGG3   --   7*   IGG4   --   59     Metabolic Studies       Recent  Labs   Lab Test  08/11/17   0650  08/10/17   2135  08/10/17   1323  08/10/17   0704  08/09/17   2024  08/09/17   1457  08/09/17   1155  08/09/17   0711   08/08/17   2237   08/08/17   0600   08/07/17   0549   08/01/17   0651   07/25/17   0945   07/06/17   0316   NA  140   --    --   140  138   --    --   136   --    --    --   136   --   139   < >  150*   < >  137   < >  143   POTASSIUM  4.9  5.1  5.4*  5.5*  5.7*   --   5.7*  5.7*   < >   --    < >  5.8*   --   5.4*   < >  4.3   < >  4.0   < >  5.0   CHLORIDE  114*   --    --   112*  110*   --    --   111*   --    --    --   111*   --   114*   < >  123*   < >  104   < >  116*   CO2  19*   --    --   18*  20   --    --   19*   --    --    --   17*   --   18*   < >  20   < >  26   < >  18*   ANIONGAP  8   --    --   10  7   --    --   6   --    --    --   7   --   8   < >  7   < >  7   < >  9   BUN  64*   --    --   61*  58*   --    --   56*   --    --    --   55*   --   51*   < >  51*   < >  77*   < >  62*   CR  1.82*   --    --   1.70*  1.47*   --    --   1.26*   --    --    --   1.18   --   1.06   < >  0.90   < >  2.60*   < >  2.75*   GFRESTIMATED  39*   --    --   42*  50*   --    --   60*   --    --    --   65   --   73   < >  89   < >  26*   < >  24*   GLC  112*   --    --   121*  176*   --    --   187*   --    --    --   152*   --   99   < >  141*   < >  382*   < >  139*   A1C   --    --    --    --    --    --    --    --    --    --    --    --    --    --    --    --    --    --    --   Canceled, Test credited   Below Assay Range  NOTIFIED LEONARD ONEILL AT 0538 ON 7/6/17 BY CHRISSY     JACOB  8.5   --    --   8.5  8.6   --    --   8.3*   --    --    --   8.2*   --   8.2*   < >  8.1*   < >  7.6*   < >  6.9*   PHOS  5.4*   --    --   4.6*   --    --    --   5.2*   --    --    --   4.6*   --   4.9*   < >  3.1   < >   --    < >  5.5*   MAG  2.0   --    --   1.9   --    --    --   2.1   --    --    --   2.0   --   2.1   < >  1.9   < >   --    < >  2.1   LACT   --     --    --    --    --   0.9   --    --    --   1.0   --    --    < >   --    --    --    < >   --    < >  1.5   CKT   --    --    --    --    --    --    --    --    --    --    --    --    --    --    --   16*   --   40   --    --     < > = values in this interval not displayed.     Hepatic Studies    Recent Labs   Lab Test  08/10/17   1323  08/10/17   0704  08/08/17   0600  08/02/17   0543  07/27/17   0410  07/25/17   1651  07/25/17   0945  07/25/17   0017   07/11/17   0328   BILITOTAL   --   2.2*  0.5  0.4  1.3  0.6  0.5   --    < >  0.5   ALKPHOS   --   136  172*  199*  143  242*  317*   --    < >  158*   ALBUMIN   --   1.9*  2.1*  2.3*  2.4*  2.0*  1.9*   --    < >  1.8*   AST   --   36  28  62*  27  61*  90*   --    < >  34   ALT   --   26  25  42  27  50  60   --    < >  27   LDH  168   --    --    --    --    --    --   258*   --    --    GGT   --    --    --    --    --    --    --    --    --   58    < > = values in this interval not displayed.     Pancreatitis testing    Recent Labs   Lab Test  07/24/17   0705  07/17/17   0630  07/12/17   0342  07/10/17   0340  07/05/17   0346  03/05/17   0358  03/03/17   1458  02/21/17   1520  12/09/16   1159  02/08/16   1144   09/30/10   1734   AMYLASE   --    --    --    --    --   53  70   --    --    --    --   82   LIPASE   --    --    --    --    --   216   --   326  161   --    --   138   TRIG  303*  168*  114  177*  174*   --    --    --    --   99   < >   --     < > = values in this interval not displayed.     Hematology Studies      Recent Labs   Lab Test  08/11/17   0650  08/10/17   2135  08/10/17   0704  08/09/17 2024  08/09/17   0711  08/08/17   0600  08/07/17   0549   08/06/17   0633   08/05/17   0616  08/04/17   0548  08/03/17   0533  08/02/17   0543   WBC  4.3   --   6.1   --   8.8  9.4  5.5   --   5.2   --   6.3  6.9  5.1  5.0   ANEU   --    --    --    --    --    --   3.7   --   3.5   --   3.9  4.1  2.8  2.7   ALYM   --    --    --    --    --    --    1.5   --   1.5   --   2.2  2.5  1.9  2.0   ZINA   --    --    --    --    --    --   0.2   --   0.2   --   0.2  0.3  0.3  0.2   AEOS   --    --    --    --    --    --   0.1   --   0.0   --   0.0  0.0  0.0  0.1   HGB  7.9*  6.3*  7.1*  7.6*  7.7*  8.1*  7.7*   < >  7.6*   < >  6.9*  7.8*  7.3*  7.6*   HCT  24.3*   --   21.3*   --   23.3*  24.5*  23.7*   --   23.2*   --   20.9*  24.3*  23.5*  24.4*   PLT  140*   --   129*   --   122*  110*  116*   --   94*   --   94*  99*  92*  90*    < > = values in this interval not displayed.     Arterial Blood Gas Testing    Recent Labs   Lab Test  08/10/17   1515  08/02/17   1216  07/26/17   2243  07/26/17   2133   07/26/17 2017 07/25/17   1746   PH  7.33*  7.37  Incorrect specimen type  NOTTIED BY WOJCIECH DEWITT, NEW ORDER TO REPLACE.7/26/17 AT 2346 BY ZAINAB.  CORRECTED ON 07/26 AT 2350: PREVIOUSLY REPORTED AS 7.27     --    --   Canceled, Test credited   Incorrect specimen type    7.32*   PCO2  34*  31*  Incorrect specimen type  NOTTIED BY WOJCIECH DEWITT NEW ORDER TO REPLACE.7/26/17 AT 2346 BY ZAINAB.  CORRECTED ON 07/26 AT 2350: PREVIOUSLY REPORTED AS 45     --    --   Canceled, Test credited   Incorrect specimen type    42   PO2  68*  69*  Incorrect specimen type  NOTTIED BY YASHIRA SHARMA ORDER TO REPLACE.7/26/17 AT 2346 BY ZAINAB.  CORRECTED ON 07/26 AT 2350: PREVIOUSLY REPORTED AS 53     --    --   Canceled, Test credited   Incorrect specimen type    81   HCO3  18*  18*  Incorrect specimen type  NOTTIED BY WOJCIECH DEWITT NEW ORDER TO REPLACE.7/26/17 AT 2346 BY ZAINAB.  CORRECTED ON 07/26 AT 2350: PREVIOUSLY REPORTED AS 20     --    --   Canceled, Test credited   Incorrect specimen type    22   O2PER  2L  21  Incorrect specimen type  NOTTIED BY WOJCIECH DEWTIT, NEW ORDER TO REPLACE.7/26/17 AT 2346 BY ZAINAB.  CORRECTED ON 07/26 AT 2350: PREVIOUSLY REPORTED AS 40    40  62   < >  100.0  100  40.0    < > = values in this interval not displayed.     Urine Studies     Recent Labs   Lab  Test  08/10/17   1752  08/08/17   1600  08/05/17   0617  07/28/17   1042  07/25/17   1205   URINEPH  5.0  5.0  5.0  5.0  5.0   NITRITE  Negative  Negative  Negative  Negative  Negative   LEUKEST  Negative  Negative  Negative  Negative  Trace*   WBCU  10*  7*  5*  6*  5*     Vancomycin Levels     Recent Labs   Lab Test  07/06/17   0316  10/28/16   1405   VANCOMYCIN  22.1  14.4     Procalcitonins:  8/12 9.21  8/9 7.55  8/8 3.11  8/5 1.06  8/2 0.78    Microbiology:  8/11 Ascites cx:  NGTD.  Gram stain:  NOS, many WBCs (predominately PMNs).  Anaerobic / fungal cxs NGTD.  Fluid analysis:  Yellow, cloudy, WBC 5,038 (83% N, 1% L, 16% M), protein 4.0, , glucose 5, amylase 19, bilirubin 1.2.  8/11 Ur cx NGTD  8/9 Right retroperitoneal abscess drainage aerobic cx:  NGTD.  Gram stain:  NOS, many WBCs (predominately PMNs).  Anaerobic cx:  Clostridium sps isolated in broth at 48 hours of incubation.  8/9 x 2, 8/4 x 2, 8/2 x 2, 7/31 x 2, 7/28 x 2, 7/25 x 2, 7/15 Bld cxs NGTD / negative  8/5 ascites aerobic / anaerobic / fungal cxs NGTD.  Gram stain:  NOS, few WBCs (predominately PMNs), moderate RBCs.  Fluid analysis:  Yellow, slightly cloudy,  (91%N, 6% L, 3% M), albumin 1.6, protein 3.8.  8/5, 7/25 Ur cxs:  Negative  8/5 Serum CRAG negative  7/29 Sp cx:  Normal elvie, heavy C albicans  7/26 Ascites (OR) cx:  Negative.   Gram stain:  NOS, few WBCs.  7/25 Ascites cx: Negative.  Gram stain:  NOS, no WBCs  7/15 BAL fluid studies:  Yeast and Rhinovirus  7/12 BAL fluid studies:  Single colony of VRE, C albicans/dubliniensis   7/11 Sp cx:  VRE and Coag Neg Staph   7/5 Ascites cx:  S capitis    CMV viral loads    Recent Labs   Lab Test  08/10/17   0704  08/03/17   0533  07/27/17   1109  07/20/17   1427  07/18/17   0530   CSPEC  EDTA PLASMA  CORRECTED ON 08/11 AT 0714: PREVIOUSLY REPORTED AS Whole Blood    EDTA PLASMA  EDTA PLASMA  CORRECTED ON 07/28 AT 0840: PREVIOUSLY REPORTED AS Blood    Plasma  Plasma   CMVLOG  <2.1   <2.1  2.5*  3.3*  3.0*     CMV viral loads    Log IU/mL of CMVQNT   Date Value Ref Range Status   08/10/2017 <2.1 <2.1 [Log_IU]/mL Final   08/03/2017 <2.1 <2.1 [Log_IU]/mL Final   07/27/2017 2.5 (H) <2.1 [Log_IU]/mL Final   07/20/2017 3.3 (H) <2.1 [Log_IU]/mL Final   07/18/2017 3.0 (H) <2.1 [Log_IU]/mL Final   07/15/2017 3.6 (H) <2.1 [Log_IU]/mL Final   07/10/2017 2.2 (H) <2.1 [Log_IU]/mL Final   07/03/2017 <2.1 <2.1 [Log_IU]/mL Final   06/26/2017 Not Calculated <2.1 [Log_IU]/mL Final     CMV resistance testing    EBV DNA Copies/mL   Date Value Ref Range Status   08/01/2017 6864 (A) EBVNEG [Copies]/mL Final   07/18/2017 308649 (A) EBVNEG [Copies]/mL Final     Pathology:   Colectomy path 7/26/17  - Colonic wall with perforation, edema, and acute serositis   - Background colonic mucosa with no significant histologic abnormality   - CMV immunostain is positive in rare (3) cells   - Terminal ileum with no significant histologic abnormality   - Viable, unremarkable surgical margins   - One benign lymph node (0/1)   - Appendix with fibrous obliteration of the tip     Colon biopsies 7/21/2017   A: Colon biopsy, ascending   B: Colon biopsy, descending   FINAL DIAGNOSIS:   A. COLON BIOPSY, ASCENDING:   - Colonic mucosa with no significant histologic abnormality   - Negative for intraepithelial lymphocytosis or deposition of   subepithelial thick collagenous band   B. COLON BIOPSY, DESCENDING:   - Crypt architecture distortion, consistent with healed prior injury   - Negative for active inflammation or dysplasia   - Negative for intraepithelial lymphocytosis or deposition of   subepithelial thick collagenous band     Cytology of ascitic fluid 7/25/2017   PERITONEAL FLUID, ASCITES:   -Negative for malignancy   Cytology of ascitic fluid 7/14/2017.   Peritoneal fluid, ascites:   - Negative for malignancy   - Acute and chronic inflammation present   Ascites fluid:  Rare to absent viable B cells.  (This specimen was collected on  17 but was not ordered/delivered to   lab/run until 17 - results should be interpreted with caution due   to low cellularity/viability.   Due to limited cellularity we were only able to analyze the B cells.   There is no immunophenotypic evidence of B cell non-Hodgkin lymphoma.   Neoplastic cells, including large cells, may not survive specimen   processing. This sample may not be representative. Final interpretation   requires correlation with morphologic and clinical features.)    Imagin/11 Paracentesis performed by IR.  8/10 CXRs:  Right PICC.  Increasing pulmonary edema.  Left retrocardiac and basilar opacities, atelectasis and/or infection.  Small right pleural effusion.  8/10 Abdm U/S:  Small to moderate volume complex ascites visualized, greatest in the lower quadrants, left greater than right. Right retroperitoneal air again noted.  Right-sided pleural effusion.   CT-guided retroperitoneal abscess drainage performed by IR.   Chest / abdm CT:  Unchanged right retroperitoneal air with new foci of free intraperitoneal air in the right upper quadrant. No extravasation of oral contrast identified.  No significant change in large volume abdominal ascites, thickened peritoneum and diffuse bowel wall thickening concerning for peritonitis.  Small right and trace left pleural effusions with overlying atelectasis and patchy consolidations. Overall, the consolidations are slightly increased since the prior exam. Differential includes atelectasis or infection.  Secondary signs of portal hypertension similar to the prior exam.  Unchanged mild bilateral renal pelvocaliectasis.   CXR:  Increased bibasilar streaky opacities, compatible with atelectasis and/or infection.  Probable small bilateral pleural effusions.  Persistent low lung volumes.   Ultrasound-guided paracentesis performed by IR:  250 ccyellow-brown fluid sent for analysis.   Abdm CT:  Heterogenous area of right-sided  retroperitoneal gas has mildly decreased in size (versus 7/25/17 scan).  Moderate to severe ascites with diffuse peritoneal enhancement.  This may represent diffuse infection, for example bacterial peritonitis.  Mild residual foci of free air in the abdomen is likely postsurgical.  Diffuse small bowel and colonic wall thickening, similar to prior, likely reactive to ascites/peritonitis.  Findings of portal hypertension including splenomegaly, ascites, esophageal and upper abdominal varices.  Small right pleural effusion with associated atelectasis. Improved left pleural effusion and basilar consolidation.  8/4 CXR:  Decreased lung volumes with improving perihilar and bibasilar opacities, which may represent atelectasis, pulmonary edema, or infection.  8/2 CXR: Decreased lung volumes with mildly increased perihilar and bibasilar opacities, which may represent pulmonary edema, atelectasis, or infection.  Small right pleural effusion.  8/2 BLE U/S:  Right leg: Normal RONAL. Mildly Abnormal TBI, suggestive of small vessel disease. Patent right lower extremity arteries without evidence of hemodynamically significant stenosis.  Left leg: Normal RONAL. Abnormal TBI, suggestive of small vessel disease. Patent left lower extremity arteries without evidence of hemodynamically significant stenosis.  7/31 CXR:  Improving perihilar and bibasilar opacities likely represent infection/aspiration, atelectasis, or pulmonary edema.  Moderate opacities bilaterally persist.  Stable small to moderate pleural effusions bilaterally.  7/28 CXR:  Interval removal of the endotracheal tube.  Increased perihilar opacities, worsening infection versus pulmonary edema.  Increased bilateral moderate pleural effusions with overlying atelectasis/consolidation.  7/25 Chest / abdm CT:  New large heterogeneous area of right-sided retroperitoneal gas which is highly concerning for an infectious process.  Given the history of prior neutropenic colitis and  biopsies, there could also be a component of stool from a ruptured viscus, despite the lack of  surrounding bowel loops and no extraluminal oral contrast. Surgical consultation is recommended. Bibasilar atelectasis versus consolidation with small bilateral pleural effusions.  Extensive mesenteric and soft tissue edema with large volume ascites. Numerous mesenteric and retroperitoneal lymph nodes which are presumably reactive.  Postsurgical changes of liver transplant.  7/20 Brain MRI:  Significant image degradation due to motion artifact, with no definite acute intracranial pathology. No abnormal contrast enhancing intracranial lesions.  Mucosal thickening in the sphenoid and maxillary sinuses and left greater than right mastoid fluid are nonspecific in the setting of recent intubation.  7/13 Chest / abdm CT:  Improved moderate right-sided pleural effusion and resolution of left-sided pleural effusion. There remain large areas of atelectasis/consolidation in both lungs, including in the left lower lobe despite resolution of left-sided pleural effusion. Superimposed infectious process cannot be excluded.  Moderate-large amount of ascites increased from 7/8/2017, which makes it difficult to evaluate for the presence or absence of inflammatory change or infectious process in the abdomen or pelvis. No intra-abdominal abscess.  Stable small presumed hematoma within the ventral abdominal wall fat.  Splenomegaly.

## 2017-08-11 NOTE — PLAN OF CARE
Problem: Goal Outcome Summary  Goal: Goal Outcome Summary  Outcome: No Change  Hypertensive this shift, 160/80's at its highest. Tachypnea noted, RR 24-28. OVSS on 2L of O2/NC. Blood sugars 166 and 126. C/o pain with movement otherwise rests comfortably in bed. Denies nausea. Tolerating NPO status. PICC to RA infusing tacrolimus at 7.5mL/hr and NS at TKO. PIV to RFA saline locked. NJ clamped. Right flank drain put out 125mL of serosanguineous drainage. Crowe patent draining cloudy, hardik/orange urine. Ileostomy patent draining small amounts of loose green BM. Abdominal incisions well approximated. Mucous fistula covered with dressing. Dressing is clean, dry, intact. Received 2 units of RBC this shift for Hgb of 6.3. Recheck this morning. Confusion continues. Plan to have paracentesis done in IR today.

## 2017-08-11 NOTE — PLAN OF CARE
Problem: Goal Outcome Summary  Goal: Goal Outcome Summary  Outcome: No Change  A/Ox1; only oriented to self. VPM continued + hand mitts on due to confusion. Up with mechanical or team lift. NPO; NJ clamped. Pt tachypneic (RR 30) throughout shift, but does not appear to be in distress. Sats >95% on 2L o2; unable to wean per orders. Tele is NST; -110s. Tmax 99.7. K+ recheck was 5.1 (from 5.4). Hgb recheck was 6.3 (from 7.1); MD notified and 2 units PRBCs ordered. Prograf drip @ 150mcg/hr. R double-lumen PICC placed this evening. Straight cath @ 1745 for UA with 300mL output. Condom cath attempted throughout shift, but does not stay in place. Pt was unable to start stream and complaining of the need to void. Bladder scan 350 mL @ 2300; MD notified - orders for Crowe to be placed. Ileostomy to Crowe bag. R flank drain with 100 mL output; irrigated per orders. Pt c/o chest pain; EKG completed. Plan for paracentesis in IR tomorrow.

## 2017-08-11 NOTE — PROGRESS NOTES
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  Now s/p ex lap with R hemicolectomy, end ileostomy, mucus fistula, partial omenectomy on 7/26.    Glucoses in low to mid 100s overnight with no basal insulin on board.  Starting to increase now.  Still uncertain if TFs will restart tonight, if they do it will likely be at a lower rate and then advance as tolerated.    Plan:    -No basal insulin for now  -If TFs are to restart and advance overnight recommend IV insulin start- we would then transition back to SC doses once TFs reach goal (previously required glargine 28 units and NPH 17 units for Nepro at 60ml/h).  -meal aspart 1unit/7g CHO ordered for meals and snacks (not taking any currently)  -aspart high correction q4h     Please notify diabetes team of changes planned for nutrition support schedule.     Outpatient diabetes follow up: Dr. Eckert at [facility]  Plan reviewed with bedside nurse.  ADDENDUM: Notified by bedside RN that TFs will start this evening at 15ml/h and advance by 10ml/h q8h.  IV insulin infusion ordered to start once BG >150, pt can continue to get 1unit/7g CHO for po intake while Iv insulin is running.                 Interval History:   8/7/17 Pt upset about inpatient diabetes mgmt consult.  Transplant contacted pt's outpatient endocrinologist Dr. Eckert of [facility]-- no issue with inpatient team managing pt's glucose    The last 24 hours progress and nursing notes reviewed.   Order for glargine appears to have timed out before it could be given last night- so no glargine on board overnight or this morning.  TFs remain  off and may restart later today- the plan is still unclear.  Glucoses have been surprisingly near normal range with no basal insulin: 166 at MN, 112 at 0700.  Now, glucoses are starting to trend higher: 176 midday and pt was taken for thoracentesis.            Recent Labs  Lab 08/11/17  1206 08/11/17  0650 08/11/17  0421 08/11/17  0029 08/10/17  1927 08/10/17  1616 08/10/17  1157 08/10/17  0704  08/09/17  2024  08/09/17  0711  08/08/17  0600  08/07/17  0549   GLC  --  112*  --   --   --   --   --  121*  --  176*  --  187*  --  152*  --  99   *  --  126* 166* 134* 149* 120*  --   < >  --   < >  --   < >  --   < >  --    < > = values in this interval not displayed.            Review of Systems:   See interval hx          Medications:   PTA taking Januvia and glargine versus glargine and aspart per carb    Active Diet Order      NPO per Anesthesia Guidelines for Procedure/Surgery Except for: Meds, NPO but receiving Tube Feeding     Physical Exam:  Gen:  Sleeping, NAD.   HEENT: NC/AT,  NJ feeding tube  Resp: unlabored at rest  Neuro: sleeping    BP (!) 149/91 (BP Location: Left arm)  Pulse 105  Temp 97.8  F (36.6  C) (Oral)  Resp 20  Ht 1.829 m (6')  Wt 93.7 kg (206 lb 9.6 oz)  SpO2 96%  BMI 28.02 kg/m2           Data:     Lab Results   Component Value Date    A1C  07/06/2017     Canceled, Test credited   Below Assay Range  NOTIFIED LEONARD ONEILL AT 0538 ON 7/6/17 BY EAANTOLIN      A1C 9.4 02/16/2017    A1C 6.2 12/14/2016    A1C 6.1 10/27/2016    A1C 8.3 07/02/2012            Recent Labs   Lab Test  08/11/17   0650  08/10/17   2135   08/10/17   0704   NA  140   --    --   140   POTASSIUM  4.9  5.1   < >  5.5*   CHLORIDE  114*   --    --   112*   CO2  19*   --    --   18*   ANIONGAP  8   --    --   10   GLC  112*   --    --   121*   BUN  64*   --    --   61*   CR  1.82*   --    --   1.70*   JACOB  8.5   --    --   8.5    < > = values in this interval not displayed.     CBC RESULTS:   Recent Labs   Lab Test   08/11/17   0650   WBC  4.3   RBC  2.76*   HGB  7.9*   HCT  24.3*   MCV  88   MCH  28.6   MCHC  32.5   RDW  17.6*   PLT  140*     Giovana Villasenor PA-C [phone]  Diabetes Management job code [id number]

## 2017-08-11 NOTE — PROGRESS NOTES
Nutrition Services Brief Progress Note - please see note from 8/8 for full reassessment    Diet: NPO  Modulars: Prosource 1 packet TID for additional 120 kcals and 33 g PRO  Labs: (8/11): phos 5.4 mg/dL (H), K+ 4.9 mmol/L   Stools: may formed per team    Intervetnions  1. Discussed FEN/GI with team. Team okayed for this writer to restart TFs.   2. TF order as follows: Nepro @ 15 ml/hr for 8 hrs and increase 10 ml q 8 hrs until goal rate 65 ml/hr (1560 ml/day) to provide 2808 kcals (32 kcal/kg/day), 126 g PRO (1.4 g/kg/day), 1139 ml free H2O, 251 g CHO and 20 g Fiber daily.   - Continue Prosource 1 packet TID (additional 120 kcals and 33 g PRO)    TOTAL KCALS/PRO 2928 kcals (33 kcals/kg) and 146 g PRO (1.6 g/kg) per dosing weight 89 kg     Unit RD will continue to follow per protocol  Miya Villagran MS/RD/LD/CNSC  [address] RD Pager: [phone]

## 2017-08-11 NOTE — PROCEDURES
Interventional Radiology Brief Post Procedure Note    Procedure: IR PARACENTESIS    Proceduralist: Basilio Dominguez MD    Assistant: Radiology Resident Physician, Franko Ames    Time Out: Prior to the start of the procedure and with procedural staff participation, I verbally confirmed the patient s identity using two indicators, relevant allergies, that the procedure was appropriate and matched the consent or emergent situation, and that the correct equipment/implants were available. Immediately prior to starting the procedure I conducted the Time Out with the procedural staff and re-confirmed the patient s name, procedure, and site/side. (The Joint Commission universal protocol was followed.)  Yes      Sedation: None. Local Anesthestic used    Findings: 400 ml straw colored fluid aspirated form left lower quadrant under ultrasound guidance. 90 ml of the aspirated fluid sent for laboratory analysis.     Estimated Blood Loss: Minimal    SPECIMENS: Fluid and/or tissue for laboratory analysis    Complications:  None     Condition: Stable    Plan: follow up with laboratory results.    Comments: See dictated procedure note for full details.    Franko Ames MD

## 2017-08-11 NOTE — PLAN OF CARE
Problem: Goal Outcome Summary  Goal: Goal Outcome Summary  SLP: Dysphagia intervention on hold.  Pt medical NPO status continues.  ST to f/u with dysphagia intervention as medically appropriate Monday, 8/14.

## 2017-08-12 ENCOUNTER — APPOINTMENT (OUTPATIENT)
Dept: OCCUPATIONAL THERAPY | Facility: CLINIC | Age: 52
End: 2017-08-12
Attending: TRANSPLANT SURGERY
Payer: COMMERCIAL

## 2017-08-12 LAB
ANION GAP SERPL CALCULATED.3IONS-SCNC: 7 MMOL/L (ref 3–14)
BACTERIA SPEC CULT: NO GROWTH
BACTERIA SPEC CULT: NORMAL
BUN SERPL-MCNC: 68 MG/DL (ref 7–30)
CALCIUM SERPL-MCNC: 8.6 MG/DL (ref 8.5–10.1)
CHLORIDE SERPL-SCNC: 115 MMOL/L (ref 94–109)
CO2 SERPL-SCNC: 22 MMOL/L (ref 20–32)
CREAT SERPL-MCNC: 1.82 MG/DL (ref 0.66–1.25)
ERYTHROCYTE [DISTWIDTH] IN BLOOD BY AUTOMATED COUNT: 17.9 % (ref 10–15)
GFR SERPL CREATININE-BSD FRML MDRD: 39 ML/MIN/1.7M2
GLUCOSE BLDC GLUCOMTR-MCNC: 134 MG/DL (ref 70–99)
GLUCOSE BLDC GLUCOMTR-MCNC: 136 MG/DL (ref 70–99)
GLUCOSE BLDC GLUCOMTR-MCNC: 138 MG/DL (ref 70–99)
GLUCOSE BLDC GLUCOMTR-MCNC: 141 MG/DL (ref 70–99)
GLUCOSE BLDC GLUCOMTR-MCNC: 142 MG/DL (ref 70–99)
GLUCOSE BLDC GLUCOMTR-MCNC: 142 MG/DL (ref 70–99)
GLUCOSE BLDC GLUCOMTR-MCNC: 143 MG/DL (ref 70–99)
GLUCOSE BLDC GLUCOMTR-MCNC: 145 MG/DL (ref 70–99)
GLUCOSE BLDC GLUCOMTR-MCNC: 153 MG/DL (ref 70–99)
GLUCOSE BLDC GLUCOMTR-MCNC: 154 MG/DL (ref 70–99)
GLUCOSE BLDC GLUCOMTR-MCNC: 155 MG/DL (ref 70–99)
GLUCOSE BLDC GLUCOMTR-MCNC: 155 MG/DL (ref 70–99)
GLUCOSE BLDC GLUCOMTR-MCNC: 156 MG/DL (ref 70–99)
GLUCOSE BLDC GLUCOMTR-MCNC: 158 MG/DL (ref 70–99)
GLUCOSE BLDC GLUCOMTR-MCNC: 166 MG/DL (ref 70–99)
GLUCOSE BLDC GLUCOMTR-MCNC: 167 MG/DL (ref 70–99)
GLUCOSE BLDC GLUCOMTR-MCNC: 173 MG/DL (ref 70–99)
GLUCOSE BLDC GLUCOMTR-MCNC: 180 MG/DL (ref 70–99)
GLUCOSE SERPL-MCNC: 147 MG/DL (ref 70–99)
HCT VFR BLD AUTO: 24.5 % (ref 40–53)
HGB BLD-MCNC: 7.9 G/DL (ref 13.3–17.7)
Lab: NORMAL
Lab: NORMAL
MAGNESIUM SERPL-MCNC: 2.1 MG/DL (ref 1.6–2.3)
MCH RBC QN AUTO: 29.3 PG (ref 26.5–33)
MCHC RBC AUTO-ENTMCNC: 32.2 G/DL (ref 31.5–36.5)
MCV RBC AUTO: 91 FL (ref 78–100)
MICRO REPORT STATUS: NORMAL
MICRO REPORT STATUS: NORMAL
PHOSPHATE SERPL-MCNC: 5.7 MG/DL (ref 2.5–4.5)
PLATELET # BLD AUTO: 132 10E9/L (ref 150–450)
POTASSIUM SERPL-SCNC: 4.4 MMOL/L (ref 3.4–5.3)
PROCALCITONIN SERPL-MCNC: 9.21 NG/ML
RBC # BLD AUTO: 2.7 10E12/L (ref 4.4–5.9)
SODIUM SERPL-SCNC: 143 MMOL/L (ref 133–144)
SPECIMEN SOURCE: NORMAL
SPECIMEN SOURCE: NORMAL
TACROLIMUS BLD-MCNC: 9.4 UG/L (ref 5–15)
TME LAST DOSE: NORMAL H
WBC # BLD AUTO: 2.8 10E9/L (ref 4–11)

## 2017-08-12 PROCEDURE — 25000125 ZZHC RX 250: Performed by: PHYSICIAN ASSISTANT

## 2017-08-12 PROCEDURE — 36592 COLLECT BLOOD FROM PICC: CPT | Performed by: STUDENT IN AN ORGANIZED HEALTH CARE EDUCATION/TRAINING PROGRAM

## 2017-08-12 PROCEDURE — 25000132 ZZH RX MED GY IP 250 OP 250 PS 637: Performed by: STUDENT IN AN ORGANIZED HEALTH CARE EDUCATION/TRAINING PROGRAM

## 2017-08-12 PROCEDURE — 27210432 ZZH NUTRITION PRODUCT RENAL BASIC LITER

## 2017-08-12 PROCEDURE — 97535 SELF CARE MNGMENT TRAINING: CPT | Mod: GO

## 2017-08-12 PROCEDURE — 80197 ASSAY OF TACROLIMUS: CPT | Performed by: STUDENT IN AN ORGANIZED HEALTH CARE EDUCATION/TRAINING PROGRAM

## 2017-08-12 PROCEDURE — 84100 ASSAY OF PHOSPHORUS: CPT | Performed by: STUDENT IN AN ORGANIZED HEALTH CARE EDUCATION/TRAINING PROGRAM

## 2017-08-12 PROCEDURE — 25000132 ZZH RX MED GY IP 250 OP 250 PS 637: Performed by: SURGERY

## 2017-08-12 PROCEDURE — 25000128 H RX IP 250 OP 636: Performed by: NURSE PRACTITIONER

## 2017-08-12 PROCEDURE — 83735 ASSAY OF MAGNESIUM: CPT | Performed by: STUDENT IN AN ORGANIZED HEALTH CARE EDUCATION/TRAINING PROGRAM

## 2017-08-12 PROCEDURE — 12000024 ZZH R&B TRANSPLANT CRITICAL

## 2017-08-12 PROCEDURE — 25000132 ZZH RX MED GY IP 250 OP 250 PS 637: Performed by: TRANSPLANT SURGERY

## 2017-08-12 PROCEDURE — 97530 THERAPEUTIC ACTIVITIES: CPT | Mod: GO

## 2017-08-12 PROCEDURE — 00000146 ZZHCL STATISTIC GLUCOSE BY METER IP

## 2017-08-12 PROCEDURE — 25000128 H RX IP 250 OP 636

## 2017-08-12 PROCEDURE — 40000133 ZZH STATISTIC OT WARD VISIT

## 2017-08-12 PROCEDURE — 25000128 H RX IP 250 OP 636: Performed by: PHYSICIAN ASSISTANT

## 2017-08-12 PROCEDURE — 85027 COMPLETE CBC AUTOMATED: CPT | Performed by: STUDENT IN AN ORGANIZED HEALTH CARE EDUCATION/TRAINING PROGRAM

## 2017-08-12 PROCEDURE — 25000132 ZZH RX MED GY IP 250 OP 250 PS 637: Performed by: PHYSICIAN ASSISTANT

## 2017-08-12 PROCEDURE — 27210913 ZZH NUTRITION PRODUCT INTERMEDIATE PACKET

## 2017-08-12 PROCEDURE — 25000132 ZZH RX MED GY IP 250 OP 250 PS 637: Performed by: NURSE PRACTITIONER

## 2017-08-12 PROCEDURE — 84145 PROCALCITONIN (PCT): CPT | Performed by: STUDENT IN AN ORGANIZED HEALTH CARE EDUCATION/TRAINING PROGRAM

## 2017-08-12 PROCEDURE — 80048 BASIC METABOLIC PNL TOTAL CA: CPT | Performed by: STUDENT IN AN ORGANIZED HEALTH CARE EDUCATION/TRAINING PROGRAM

## 2017-08-12 RX ORDER — DEXTROSE MONOHYDRATE 50 MG/ML
INJECTION, SOLUTION INTRAVENOUS
Status: COMPLETED
Start: 2017-08-12 | End: 2017-08-12

## 2017-08-12 RX ADMIN — PANTOPRAZOLE SODIUM 40 MG: 40 TABLET, DELAYED RELEASE ORAL at 07:46

## 2017-08-12 RX ADMIN — FLUDROCORTISONE ACETATE 0.1 MG: 0.1 TABLET ORAL at 20:29

## 2017-08-12 RX ADMIN — SODIUM BICARBONATE 650 MG TABLET 650 MG: at 20:29

## 2017-08-12 RX ADMIN — Medication 5 ML: at 20:29

## 2017-08-12 RX ADMIN — FLUDROCORTISONE ACETATE 0.1 MG: 0.1 TABLET ORAL at 07:46

## 2017-08-12 RX ADMIN — TACROLIMUS 150 MCG/HR: 5 INJECTION, SOLUTION INTRAVENOUS at 14:53

## 2017-08-12 RX ADMIN — PIPERACILLIN AND TAZOBACTAM 3.38 G: 3; .375 INJECTION, POWDER, LYOPHILIZED, FOR SOLUTION INTRAVENOUS; PARENTERAL at 06:17

## 2017-08-12 RX ADMIN — LOPERAMIDE HYDROCHLORIDE 4 MG: 2 SOLUTION ORAL at 12:20

## 2017-08-12 RX ADMIN — SULFAMETHOXAZOLE AND TRIMETHOPRIM 80 MG: 200; 40 SUSPENSION ORAL at 07:46

## 2017-08-12 RX ADMIN — Medication 220 MG: at 07:46

## 2017-08-12 RX ADMIN — ACETAMINOPHEN 650 MG: 325 SOLUTION ORAL at 18:30

## 2017-08-12 RX ADMIN — OXYCODONE HYDROCHLORIDE 5 MG: 5 SOLUTION ORAL at 20:29

## 2017-08-12 RX ADMIN — Medication 80 MG: at 07:46

## 2017-08-12 RX ADMIN — OXYCODONE HYDROCHLORIDE 5 MG: 5 SOLUTION ORAL at 03:25

## 2017-08-12 RX ADMIN — LOPERAMIDE HYDROCHLORIDE 4 MG: 2 SOLUTION ORAL at 20:29

## 2017-08-12 RX ADMIN — PIPERACILLIN AND TAZOBACTAM 3.38 G: 3; .375 INJECTION, POWDER, LYOPHILIZED, FOR SOLUTION INTRAVENOUS; PARENTERAL at 12:59

## 2017-08-12 RX ADMIN — Medication 5 ML: at 07:46

## 2017-08-12 RX ADMIN — PIPERACILLIN AND TAZOBACTAM 3.38 G: 3; .375 INJECTION, POWDER, LYOPHILIZED, FOR SOLUTION INTRAVENOUS; PARENTERAL at 00:01

## 2017-08-12 RX ADMIN — LINEZOLID 600 MG: 600 INJECTION, SOLUTION INTRAVENOUS at 10:27

## 2017-08-12 RX ADMIN — Medication 1 PACKET: at 07:48

## 2017-08-12 RX ADMIN — PIPERACILLIN AND TAZOBACTAM 3.38 G: 3; .375 INJECTION, POWDER, LYOPHILIZED, FOR SOLUTION INTRAVENOUS; PARENTERAL at 18:21

## 2017-08-12 RX ADMIN — DEXTROSE MONOHYDRATE 10 ML/HR: 50 INJECTION, SOLUTION INTRAVENOUS at 13:59

## 2017-08-12 RX ADMIN — SODIUM BICARBONATE 650 MG TABLET 650 MG: at 14:45

## 2017-08-12 RX ADMIN — ASCORBIC ACID TAB 250 MG 250 MG: 250 TAB at 07:46

## 2017-08-12 RX ADMIN — SODIUM BICARBONATE 650 MG TABLET 650 MG: at 07:46

## 2017-08-12 RX ADMIN — GANCICLOVIR SODIUM 650 MG: 500 INJECTION, POWDER, LYOPHILIZED, FOR SOLUTION INTRAVENOUS at 13:56

## 2017-08-12 RX ADMIN — Medication 1 PACKET: at 14:45

## 2017-08-12 RX ADMIN — MICONAZOLE NITRATE: 20 CREAM TOPICAL at 07:47

## 2017-08-12 RX ADMIN — HUMAN INSULIN 2 UNITS/HR: 100 INJECTION, SOLUTION SUBCUTANEOUS at 18:25

## 2017-08-12 RX ADMIN — Medication 1 PACKET: at 20:36

## 2017-08-12 RX ADMIN — LINEZOLID 600 MG: 600 INJECTION, SOLUTION INTRAVENOUS at 22:17

## 2017-08-12 RX ADMIN — ONDANSETRON 4 MG: 2 INJECTION INTRAMUSCULAR; INTRAVENOUS at 12:59

## 2017-08-12 NOTE — PLAN OF CARE
Problem: Individualization  Goal: Patient Preferences  Outcome: No Change     RN:  AVSS, O2 sat 97-98% @2L/NC , back/generalized pain controlled with Oxycodone, given x2, denies nausea. TF@25cc/hr, to be increase 10cc/hr every 8 hours to goal @65cc/hr, next increase to 35cc/hr at 0800, blood glucose 150's, at present Insulin gtt@1.5units/hr. Prograf gtt@150mcg/hr (7.5cc/hr) Right drain with small amount of serous output, irrigated with NS, ileostomy with dark green thick 200cc output, urethral catheter with adequate urine output. Patient resting between cares, will continue to monitor.

## 2017-08-12 NOTE — PLAN OF CARE
Problem: Goal Outcome Summary  Goal: Goal Outcome Summary  OT/7A: Pt continues to be limited by significant back pain. Declining stand pivot transfer to chair due to pain, facilitated dependent transfer via universal lift for increased activity tolerance, participation in ADLs, and improved pulmonary hygiene. Facilitated ADLs while upright for command following and functional use of UEs, pt able to complete x 3 grooming tasks, demonstrated significant weakness in UEs requiring mod A for washing face/UEs and hair combing. Pt SBA with increased cues for brushing teeth.     Recommendation: TCU as pt is limited by decreased strength/activity tolerance, pain, post-surgical precautions, and cognition impacting pt's independence in ADLs/IADLs.

## 2017-08-12 NOTE — PROGRESS NOTES
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      # EJ on baseline renal failure since liver transplant  Suspect ATN with infection/sepsis concerns and/or IV contrast exposure about 3 days prior and/or return to his prior renal function on Prograf with known Prograf renal toxicity (vasocontriction and chronic effects as well). Pt appears to have had adequate fluid challenge. Unknown effects on urination without I/Os.   -recommend UA with micro (given now creat rise)   -recommend urinary catheter (Crowe vs condom cath) and strict I/Os     # hyperkalemia (chronic), improving   Pt with hyperkalemia for months fitting a type 4 RTA from his Prograf. Additional contributions from EJ and elevated blood sugars (lack of adequate insulin) as well. Possible acidosis contribution as well with low bicarb, recommend checking ABG. Given downtrending to near normal levels now will not recommend further shifting at this time, rather CTM potassium. Gentle hydration and/or Lasix can also be used to help excrete potassium, but K-cocktail fluids did not make significant difference and will hold off on those two options at this time.   -agree with increase of Florinef to 0.1mg BID  -agree with Endo consult for improved BS control  -recommend sodium bicarb tabs 650mg PO TID   -recommend checking ABG  -continue to monitor potassium   8/12 sable K at 4.4      # volume status  Pt appears euvolemic without edema on minimal oxygen. Fluid resuscitated and adequate fluid trial for EJ per I/Os. Recommend being wary of additional IVFs (other than bolus IVFs for hypotension should that occur) pending his  I/Os to monitor his UOP in EJ.      # electrolytes  Potassium as above. Sodium, calcium, magnesium WNL.   Hyperphosphatemia - due to EJ, not at concerning level at 4.6     # acid-base  Bicarb low at 18, see hyperkalemia for workup of suspected metabolic non-gap acidosis.     Recommendations were communicated to primary team       Seen and discussed with Dr.Thakar Tl Higginbotham MD   [phone]    Interval History :   In the last 24 hours Camacho Bhagat denies any specific compliant , cr remain stable, K improved.   Review of Systems:   (4 pt ROS reviewed alone is not adequate unless you detail systems you reviewed)  I reviewed the following systems:  GI: good appetite. no nausea or vomiting or diarrhea.   Neuro:  no confusion  Constitutional:  no fever or chills  CV: no dyspnea or edema.  no chest pain.    Physical Exam:   I/O last 3 completed shifts:  In: 2140 [P.O.:240; I.V.:1100; Other:25; NG/GT:210]  Out: 3310 [Urine:1250; Drains:710; Stool:1350]   /83 (BP Location: Left arm)  Pulse 105  Temp 97.9  F (36.6  C) (Oral)  Resp 20  Ht 1.829 m (6')  Wt 86.9 kg (191 lb 8 oz)  SpO2 97%  BMI 25.97 kg/m2     GENERAL APPEARANCE: alert  EYES:  no scleral icterus, pupils equal  HENT: mouth without ulcers or lesions  PULM: lungs clear to auscultation,  bilaterally, equal air movement, no clubbing  CV: regular rhythm, normal rate, no rub     -JVP      -edema    GI: soft, none tender, notdistended, bowel sounds are +ve  INTEGUMENT: no cyanosis, no rash  NEURO:  no asterixis     Labs:   All labs reviewed by me  Electrolytes/Renal -   Recent Labs   Lab Test  08/12/17   0630  08/11/17   0650  08/10/17   2135   08/10/17   0704   NA  143  140   --    --   140   POTASSIUM  4.4  4.9  5.1   < >  5.5*   CHLORIDE  115*  114*   --    --   112*   CO2  22  19*   --    --   18*   BUN  68*  64*   --    --   61*   CR  1.82*  1.82*   --    --   1.70*   GLC  147*  112*   --    --   121*   JACOB  8.6  8.5   --    --   8.5   MAG   2.1  2.0   --    --   1.9   PHOS  5.7*  5.4*   --    --   4.6*    < > = values in this interval not displayed.       CBC -   Recent Labs   Lab Test  08/12/17   0630  08/11/17   0650  08/10/17   2135  08/10/17   0704   WBC  2.8*  4.3   --   6.1   HGB  7.9*  7.9*  6.3*  7.1*   PLT  132*  140*   --   129*       LFTs -   Recent Labs   Lab Test  08/10/17   0704  08/08/17   0600  08/02/17   0543   ALKPHOS  136  172*  199*   BILITOTAL  2.2*  0.5  0.4   ALT  26  25  42   AST  36  28  62*   PROTTOTAL  6.0*  6.4*  6.1*   ALBUMIN  1.9*  2.1*  2.3*       Iron Panel -   Recent Labs   Lab Test  02/08/16   1144   IRON  93   IRONSAT  41         Imaging:    Current Medications:    ascorbic acid  250 mg Oral Daily     sulfamethoxazole-trimethoprim  10 mL Oral Daily     ganciclovir (CYTOVENE) intermittent infusion  7.5 mg/kg Intravenous Q24H     miconazole with skin protectant   Topical BID     sodium bicarbonate  650 mg Per NG tube TID     linezolid  600 mg Intravenous Q12H     piperacillin-tazobactam  3.375 g Intravenous Q6H     sodium chloride (PF)  10 mL Irrigation Q8H     fludrocortisone  0.1 mg Oral BID     multivitamin CF formula  5 mL Per Feeding Tube Daily     zinc sulfate  220 mg Per Feeding Tube Daily     diphenoxylate-atropine  5 mL Oral BID     sodium chloride (PF)  3 mL Intracatheter Q8H     loperamide  4 mg Oral or Feeding Tube 4x Daily     insulin aspart   Subcutaneous TID w/meals     protein modular  1 packet Per Feeding Tube TID     aspirin  80 mg Oral Daily     pantoprazole  40 mg Oral or Feeding Tube Daily     sodium chloride (PF)  3 mL Intracatheter Q8H       insulin (regular) 2 Units/hr (08/12/17 1700)     NaCl 10 mL/hr at 08/12/17 0800     tacrolimus (Prograf) infusion ADULT 150 mcg/hr (08/12/17 1453)     - MEDICATION INSTRUCTIONS -       IV fluid REPLACEMENT ONLY Stopped (08/09/17 1808)     Tl Higginbotham MD

## 2017-08-12 NOTE — PROGRESS NOTES
Immunosuppression Note:    Camacho Bhagat is a 51 year old male who is seen today  for immunosuppression management     I, Jovan Tran MD, I have examined the patient with the resident/PA/Fellow, discussed and agree with the note and findings.  I have reviewed today's vital signs, medications, labs and imaging. I reviewed the immunosuppression medications and levels. I spoke to the patient/family and explained below clinical details and answered all the questions      Transplant Surgery  Inpatient Daily Progress Note  08/12/2017    Assessment & Plan: Camacho Bhagat is a 51 yo M with a history of ESLD due to CASTAÑEDA s/p DD OLT 3/4/17 admitted 7/4/17 from [facility] for evaluation and management of sepsis secondary to colitis, taken to OR for initial exploratory laparotomy with findings of typhlitis in the right colon, wound left open with wound vac in place for reexploration and interval closure on 7/5/2017. Concern for CMV colitis with low count CMV viremia/GI PTLD and subsequently underwent colonoscopy on 7/21, random biopsies obtained.  On 7/25/2017 patient had respiratory distress, abdominal compartment syndrome and EJ with oliguria. Broad spectrum antibiotics were started.  He was intubated and had a paracentesis with 5L removed. He did not required pressors.  CT was obtained which showed a new large heterogeneous right sided retroperitoneal gas and air tracking along duodenum.  No contrast extravasation.  No intraperitoneal air noted. Colorectal surgery was consulted. Initial conservative management with bowel rest and IV abx. Pt continued to spike fevers despite IV abx.  Now s/p Exploratory laparotomy, right angelique-colectomy, end ileostomy, mucus fistula, partial omentectomy on 7/26.    Graft Function: Good function. LFTs acceptable (checking every Monday, Thursday).   Immunosuppression Management:   CellCept discontinued (6/27/17) due to neutropenia.   Prograf goal ~5-6 due to sepsis.  Unable to obtain  detectable level with suspension.  Level 9.4 on tacro gtt, today's level.  No changes today  Cardiorespiratory: Stable respiratory status, NLB on RA  Hematology: Pancytopenia on admission, acute on chronic, secondary to medications (MMF, bactrim, valcyte). Improved with holding medications and neupogen. 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  Continue to hold MMF and bactrim.   -Anemia- Hemoglobin stable. WBC stable. Last blood transfusion on 7/26.   GI:   -Neutropenic colitis on admission. Colonoscopy 7/21. Biopsy: resolving injury secondary to possible drug induced vs infectious.   -Bowel perforation: 7/25 CT scan abd/pelvis, po contrast, showed a new large heterogeneous right sided retroperitoneal gas and air tracking along duodenum.  No contrast extravasation.  No intraperitoneal air noted. Colorectal surgery consulted.  Continued to spike high grade temperature despite IV antibiotics therefore patient taken to OR. s/p Exploratory laparotomy, right angelique-colectomy, end ileostomy, mucus fistula, partial omentectomy on 7/26. Findings no neida perforation, phlegmon/inflammationright colon. Colon pathology suggested colon perforation, negative for malignancy; CMV stain positive.    -Diet:  tube feeds increased up to 35 cc this AM, reports tolerating well, and will add a liquid diet today.  -High output  ileostomy: Decreased output yesterday, 250 cc due to tube feeds being on HOLD.  Goal ileostomy output ~ 1000 cc in 24 hrs.   Loperamide 2mg q6H, fiber TID, Lomotil BID 5 mg.  R/o peritonitis or bowel perforation:  CT A/P 8/9 showed a persistent gas/fluid collection RLQ, peritonitis, some free air but no evidence of contrast extravasation.  Diagnostic para today in IR.  Fluid/Electrolytes/Renal:   -Dehydration:  Tachycardic  -EJ: on admission, likely sec to high intraabdominal pressures and ischemic ATN sec to sepsis. Improved, but now Cr increasing again.  Concern for sepsis.   Nephrology consulted.  -Hypernatremia: resolved with free water.    -Hyperkalemia: on florinef, increased 8/9.  K cocktail discontinued.  Avoid kayexalate due to recent bowel perforation.    Endocrine: DM type 2. Endocrine consulting for glycemic management.  On lantus/NPH.  BG trending up, concerning for infection.  Infectious disease:  ID following.  Tmax 99.7F.  WBC 2.8 today.  -Fever:  Procalcitonin elevated.  CT C/A/P 8/9 showed a persistent gas/fluid collection RLQ, peritonitis, some free air but no evidence of contrast extravasation.  IR placed a drain, gram stain negative.  UA not suggestive of UTI, blood cultures NGTD.  Concern for evolving sepsis due to increasing Cr, tachycardia, low grade fevers, AMS, increasing blood glucose.  Started empiric linezolid and zosyn on 8/9.  Diagnostic paracentesis today.  -CMV viremia: Primary CMV infection.  CMV colitis per pathology, peak serum 7/15 4225 IU/ml.  Continue IV ganciclovir. Follow CMV PCR weekly, now <137 x 2 weeks  -EBV viremia:  Peak serum 406,697 copies/ml on 7/18.  Down to 6864 as of 8/1.  Check weekly.  -R/o SBP or abscess:  8/5 paracentesis with 250ml removed.  , cx negative.    Resolved issues:  -Severe sepsis: On admission, secondary to right colon phlegmon. Meropenem/daptomycin/micafungin tx x 10 days completed on 8/3. S/p right angelique-colectomy.  Path: CMV stain +, perforation of colon noted.  7/5 Fluid culture + staph capitis broth only (contamination).   Neuro: Toxic metabolic encephalopathy secondary to infection, prolonged hospital stay.  Delirious state is improving today.  Vascular:  Evidence of microvascular injury in digits (toes) this is most likely due to injury while patient was on pressor therapy with sepsis.  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   Activity: Deconditioned due to prolong hospital course. Daily PT/OT, encourage pt to be OOB to chair. Encourage pulmonary toilet.   Prophylaxis:  DVT-Heparin SQ  Disposition: 7A.     Medical Decision Making: Medium    PILLO/Fellow/Resident Provider:  Cyril Adams M.D.    Faculty: Jovan Tran MD    __________________________________________________________________  Transplant History:.  3/4/2017 DD OLT with Dr. Tran (Liver), Postoperative day: 161     Interval History:   Overnight events: no acute events overnight; reports feeling almost the same as yesterday; requests a regular diet.     ROS:   A 10-point review of systems was negative except as noted above.    Meds:    ascorbic acid  250 mg Oral Daily     sulfamethoxazole-trimethoprim  10 mL Oral Daily     ganciclovir (CYTOVENE) intermittent infusion  7.5 mg/kg Intravenous Q24H     miconazole with skin protectant   Topical BID     sodium bicarbonate  650 mg Per NG tube TID     linezolid  600 mg Intravenous Q12H     piperacillin-tazobactam  3.375 g Intravenous Q6H     sodium chloride (PF)  10 mL Irrigation Q8H     fludrocortisone  0.1 mg Oral BID     multivitamin CF formula  5 mL Per Feeding Tube Daily     zinc sulfate  220 mg Per Feeding Tube Daily     diphenoxylate-atropine  5 mL Oral BID     sodium chloride (PF)  3 mL Intracatheter Q8H     loperamide  4 mg Oral or Feeding Tube 4x Daily     insulin aspart   Subcutaneous TID w/meals     protein modular  1 packet Per Feeding Tube TID     aspirin  80 mg Oral Daily     pantoprazole  40 mg Oral or Feeding Tube Daily     sodium chloride (PF)  3 mL Intracatheter Q8H       Physical Exam:     Admit Weight: 94.2 kg (207 lb 11.2 oz) (SCALE 2)    Current vitals:   /83 (BP Location: Left arm)  Pulse 105  Temp 97.9  F (36.6  C) (Oral)  Resp 20  Ht 1.829 m (6')  Wt 86.9 kg (191 lb 8 oz)  SpO2 97%  BMI 25.97 kg/m2    Vital sign ranges:    Temp:  [97.7  F (36.5  C)-98.8  F (37.1  C)] 97.9  F (36.6  C)  Heart Rate:  [91-97] 97  Resp:  [20-24] 20  BP: (148-170)/(78-95) 161/83  SpO2:  [97 %-99 %] 97 %  Patient Vitals for the past 24 hrs:   BP Temp  Temp src Heart Rate Resp SpO2 Weight   08/12/17 1720 - - - - - - 86.9 kg (191 lb 8 oz)   08/12/17 1520 161/83 97.9  F (36.6  C) Oral 97 20 97 % -   08/12/17 1203 (!) 170/95 97.8  F (36.6  C) Oral 96 20 97 % -   08/12/17 0745 157/84 97.7  F (36.5  C) Oral 91 20 97 % -   08/12/17 0410 166/86 97.9  F (36.6  C) Oral 94 20 98 % -   08/12/17 0000 148/78 98.7  F (37.1  C) Axillary 92 20 99 % -   08/11/17 1928 149/79 98.8  F (37.1  C) Oral 96 24 98 % -     General Appearance: NAD  Skin: normal, warm, dry  Heart: sinus tach 100-110  Lungs: productive cough, diminished bilateral  Abdomen: soft, rounded, more tender. Ileostomy with brown output. Mucus fistula covered. Midline incision, c/d/i.   : No vazquez.   Extremities: No edema.  Necrotic blackened areas on right 1st & 2nd toes and left 2nd toe.  Neurologic: A&O x 2, waxes and wanes.       Data:   CMP    Recent Labs  Lab 08/12/17  0630 08/11/17  1310 08/11/17  0650  08/10/17  0704  08/08/17  0600     --  140  --  140  < > 136   POTASSIUM 4.4  --  4.9  < > 5.5*  < > 5.8*   CHLORIDE 115*  --  114*  --  112*  < > 111*   CO2 22  --  19*  --  18*  < > 17*   *  --  112*  --  121*  < > 152*   BUN 68*  --  64*  --  61*  < > 55*   CR 1.82*  --  1.82*  --  1.70*  < > 1.18   GFRESTIMATED 39*  --  39*  --  42*  < > 65   GFRESTBLACK 47*  --  47*  --  51*  < > 78   JACOB 8.6  --  8.5  --  8.5  < > 8.2*   MAG 2.1  --  2.0  --  1.9  < > 2.0   PHOS 5.7*  --  5.4*  --  4.6*  < > 4.6*   ALBUMIN  --   --   --   --  1.9*  --  2.1*   BILITOTAL  --   --   --   --  2.2*  --  0.5   ALKPHOS  --   --   --   --  136  --  172*   AST  --   --   --   --  36  --  28   ALT  --   --   --   --  26  --  25   FAMY  --  19  --   --   --   --   --    < > = values in this interval not displayed.  CBC    Recent Labs  Lab 08/12/17  0630 08/11/17  0650   HGB 7.9* 7.9*   WBC 2.8* 4.3   * 140*     COAGS  No lab results found in last 7 days.    Invalid input(s): XA   Urinalysis  Recent Labs   Lab  "Test  08/10/17   1752  08/08/17   1600   06/14/17   1508   04/11/16   1345   COLOR  Yellow  Yellow   < >   --    < >  Yellow   APPEARANCE  Slightly Cloudy  Slightly Cloudy   < >   --    < >  Clear   URINEGLC  Negative  Negative   < >   --    < >  30*   URINEBILI  Small   This is an unconfirmed screening test result. A positive result may be false.  *  Negative   < >   --    < >  Negative   URINEKETONE  Negative  Negative   < >   --    < >  Negative   SG  1.012  1.010   < >   --    < >  1.016   UBLD  Negative  Negative   < >   --    < >  Small*   URINEPH  5.0  5.0   < >   --    < >  5.0   PROTEIN  30*  30*   < >   --    < >  30*   NITRITE  Negative  Negative   < >   --    < >  Negative   LEUKEST  Negative  Negative   < >   --    < >  Negative   RBCU  0  3*   < >   --    < >  1   WBCU  10*  7*   < >   --    < >  1   UTPG   --    --    --   1.55*   --   0.41*    < > = values in this interval not displayed.          7/21/2017   SPECIMEN(S):   A: Colon biopsy, ascending   B: Colon biopsy, descending     FINAL DIAGNOSIS:   A. COLON BIOPSY, ASCENDING:   - Colonic mucosa with no significant histologic abnormality   - Negative for intraepithelial lymphocytosis or deposition of   subepithelial thick collagenous band     B. COLON BIOPSY, DESCENDING:   - Crypt architecture distortion, consistent with healed prior injury   - Negative for active inflammation or dysplasia   - Negative for intraepithelial lymphocytosis or deposition of   subepithelial thick collagenous band   - Please, see comment     COMMENT:   Specimen B, \"descending colon\" features lamina propria edema, slight   variation in crypt sizes and shapes and occasional crypt drop-out.  This   may indicate a resolving injury which could be drug-induced or   infectious.     CT scan 7/25/2017:   IMPRESSION:   1. New large heterogeneous area of right-sided retroperitoneal gas  which is highly concerning for an infectious process. Given the  history of prior neutropenic " colitis and biopsies, there could also be  a component of stool from a ruptured viscus, despite the lack of  surrounding bowel loops and no extraluminal oral contrast. Surgical  consultation is recommended.      2. Bibasilar atelectasis versus consolidation with small bilateral  pleural effusions.     3. Extensive mesenteric and soft tissue edema with large volume  ascites. Numerous mesenteric and retroperitoneal lymph nodes which are  presumably reactive.     4. Postsurgical changes of liver transplant.     [Urgent Result: Retroperitoneal gas]     Finding was identified on 7/25/2017 5:34 PM.      Dr. Santoyo was contacted by Dr. Atkins at 7/25/2017 5:37 PM and  verbalized understanding of the urgent finding.      I have personally reviewed the examination and initial interpretation  and I agree with the findings.     LUCI HOROWITZ, [facility]

## 2017-08-12 NOTE — PLAN OF CARE
Problem: Individualization  Goal: Patient Preferences  Outcome: No Change  RN [id number]     VSS- continues to be hypertensive but not requiring prn hydralazine. Pt remains disoriented.  -180. Insulin gtt titrated. TF increased to 45ml/hr at 1600- to be increased to 55ml/hr at midnight, goal 65ml/hr. Labs are stable. C/o pain in shoulders/back and ribs. Tylenol administered. Pt had some nausea around 1300- relieved by zofran. Pt tolerating clear liquid diet. Abdominal drain flushed and patent. Output= 145ml on day shift. Crowe in place and patent with 475ml output on days. Ileostomy patent with 750cc output on days. PICC line intact and both ports infusing. PIV SL and patent. Sacral wound covered by mepilex- dressing c/d/i. Tac gtt bag changed at 1453. Pt has been turned in bed Q2H with frequent position changes. Continue to monitor and notify MD as needed.

## 2017-08-12 NOTE — PROGRESS NOTES
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  Now s/p ex lap with R hemicolectomy, end ileostomy, mucus fistula, partial omenectomy on 7/26.    TF started last night and placed on insulin infusion.     Plan:  - Continue insulin infusion per protocol for now.   - Aspart 1unit/7g CHO ordered for meals and snacks.       Please notify diabetes team of changes planned for nutrition support schedule.     Will continue to follow.    Patient was discussed with Dr Gisella Espino.    Terri De Guzman  Endocrinology Fellow  [phone]           Interval History:   The last 24 hours progress and nursing notes reviewed.   TF started last night and placed on insulin drip. Sugars well controlled.    Recent Labs  Lab 08/12/17  1753 08/12/17  1700 08/12/17  1606 08/12/17  1519 08/12/17  1407 08/12/17  1306  08/12/17  0630  08/11/17  0650  08/10/17  0704  08/09/17  2024  08/09/17  0711  08/08/17  0600   GLC  --   --   --   --   --   --   --  147*  --  112*  --  121*  --  176*  --  187*  --  152*   * 142* 153* 155* 167* 173*  < >  --   < >  --   < >  --   < >  --   < >  --   < >  --    < > = values in this interval not displayed.            Review of Systems:   See interval hx          Medications:   PTA taking Januvia and glargine versus glargine and aspart per carb    Active Diet Order      Advance Diet as Tolerated: Clear Liquid Diet     Physical Exam:  Gen: NAD.   HEENT: NC/AT,  NJ feeding tube  Resp: unlabored at rest  Neuro: oriented x 2  /83 (BP Location: Left arm)  Pulse 105  Temp 97.9  F (36.6  C) (Oral)  Resp 20  Ht 1.829 m (6')  Wt 86.9 kg (191 lb 8 oz)  SpO2  97%  BMI 25.97 kg/m2         Data:     Lab Results   Component Value Date    A1C  07/06/2017     Canceled, Test credited   Below Assay Range  NOTIFIED LEONARD ONEILL AT 0538 ON 7/6/17 BY CHRISSY      A1C 9.4 02/16/2017    A1C 6.2 12/14/2016    A1C 6.1 10/27/2016    A1C 8.3 07/02/2012        Recent Labs   Lab Test  08/11/17   0650  08/10/17   2135   08/10/17   0704   NA  140   --    --   140   POTASSIUM  4.9  5.1   < >  5.5*   CHLORIDE  114*   --    --   112*   CO2  19*   --    --   18*   ANIONGAP  8   --    --   10   GLC  112*   --    --   121*   BUN  64*   --    --   61*   CR  1.82*   --    --   1.70*   JACOB  8.5   --    --   8.5    < > = values in this interval not displayed.     CBC RESULTS:   Recent Labs   Lab Test  08/11/17   0650   WBC  4.3   RBC  2.76*   HGB  7.9*   HCT  24.3*   MCV  88   MCH  28.6   MCHC  32.5   RDW  17.6*   PLT  140*     ATTENDING NOTE   I have reviewed and discussed case with fellow and agree with the plan of care.    Gisella Espino MD    Division of Diabetes and Endocrinology  Department of Medicine  [phone]

## 2017-08-12 NOTE — PLAN OF CARE
Problem: Individualization  Goal: Patient Preferences     Afebrile, HR intermittently tachy, other VSS, continues on 2 liters nasal cannula, saturating mid 90s%  Patient denies pain  Blood xtinpuqt=041, 138, 145  Insulin gtt continues at 1.5units/hr  Patient intermittently confused, has VPM, and bed alarm in place.  Mucous fistula dressing and sacral dressing remain intact (sacral dressing due to be changed on 8/13).  Crowe patent with fair output, ileostomy with moderate output (looser now that tube feeding rate increasing), diet restarted (dysphagia level 3), TF infusing at 35ml/hr. Right abdominal drain remains to gravity (45ml output).  Drain with resistance with irrigation (Dr. Tran notified and aware)  Prograf gtt continues to infuse at 7.5ml/hr (150mcg/hr)   Scheduled meds given as ordered.  Continue to monitor, treat as ordered, notify team with changes.  Tube feeding to be increased by 10ml/hr every 8hours to goal rate of 65ml/hr - next adjustment due at 1600 to 45ml/hr - as patient tolerates.

## 2017-08-13 LAB
ANION GAP SERPL CALCULATED.3IONS-SCNC: 9 MMOL/L (ref 3–14)
BUN SERPL-MCNC: 66 MG/DL (ref 7–30)
C DIFF TOX B STL QL: NORMAL
CALCIUM SERPL-MCNC: 8.4 MG/DL (ref 8.5–10.1)
CHLORIDE SERPL-SCNC: 116 MMOL/L (ref 94–109)
CO2 SERPL-SCNC: 17 MMOL/L (ref 20–32)
CREAT SERPL-MCNC: 1.94 MG/DL (ref 0.66–1.25)
ERYTHROCYTE [DISTWIDTH] IN BLOOD BY AUTOMATED COUNT: 17.8 % (ref 10–15)
GFR SERPL CREATININE-BSD FRML MDRD: 36 ML/MIN/1.7M2
GLUCOSE BLDC GLUCOMTR-MCNC: 103 MG/DL (ref 70–99)
GLUCOSE BLDC GLUCOMTR-MCNC: 130 MG/DL (ref 70–99)
GLUCOSE BLDC GLUCOMTR-MCNC: 131 MG/DL (ref 70–99)
GLUCOSE BLDC GLUCOMTR-MCNC: 140 MG/DL (ref 70–99)
GLUCOSE BLDC GLUCOMTR-MCNC: 154 MG/DL (ref 70–99)
GLUCOSE BLDC GLUCOMTR-MCNC: 157 MG/DL (ref 70–99)
GLUCOSE BLDC GLUCOMTR-MCNC: 157 MG/DL (ref 70–99)
GLUCOSE BLDC GLUCOMTR-MCNC: 158 MG/DL (ref 70–99)
GLUCOSE BLDC GLUCOMTR-MCNC: 164 MG/DL (ref 70–99)
GLUCOSE BLDC GLUCOMTR-MCNC: 174 MG/DL (ref 70–99)
GLUCOSE BLDC GLUCOMTR-MCNC: 176 MG/DL (ref 70–99)
GLUCOSE BLDC GLUCOMTR-MCNC: 176 MG/DL (ref 70–99)
GLUCOSE BLDC GLUCOMTR-MCNC: 177 MG/DL (ref 70–99)
GLUCOSE BLDC GLUCOMTR-MCNC: 178 MG/DL (ref 70–99)
GLUCOSE BLDC GLUCOMTR-MCNC: 178 MG/DL (ref 70–99)
GLUCOSE BLDC GLUCOMTR-MCNC: 179 MG/DL (ref 70–99)
GLUCOSE BLDC GLUCOMTR-MCNC: 182 MG/DL (ref 70–99)
GLUCOSE BLDC GLUCOMTR-MCNC: 183 MG/DL (ref 70–99)
GLUCOSE BLDC GLUCOMTR-MCNC: 188 MG/DL (ref 70–99)
GLUCOSE BLDC GLUCOMTR-MCNC: 189 MG/DL (ref 70–99)
GLUCOSE BLDC GLUCOMTR-MCNC: 97 MG/DL (ref 70–99)
GLUCOSE SERPL-MCNC: 113 MG/DL (ref 70–99)
HCT VFR BLD AUTO: 26.4 % (ref 40–53)
HGB BLD-MCNC: 8.4 G/DL (ref 13.3–17.7)
LACTATE BLD-SCNC: 1 MMOL/L (ref 0.7–2.1)
MAGNESIUM SERPL-MCNC: 2.1 MG/DL (ref 1.6–2.3)
MCH RBC QN AUTO: 28.4 PG (ref 26.5–33)
MCHC RBC AUTO-ENTMCNC: 31.8 G/DL (ref 31.5–36.5)
MCV RBC AUTO: 89 FL (ref 78–100)
PHOSPHATE SERPL-MCNC: 5.6 MG/DL (ref 2.5–4.5)
PLATELET # BLD AUTO: 156 10E9/L (ref 150–450)
POTASSIUM SERPL-SCNC: 4.5 MMOL/L (ref 3.4–5.3)
RBC # BLD AUTO: 2.96 10E12/L (ref 4.4–5.9)
SODIUM SERPL-SCNC: 142 MMOL/L (ref 133–144)
SPECIMEN SOURCE: NORMAL
TACROLIMUS BLD-MCNC: 8.7 UG/L (ref 5–15)
TME LAST DOSE: NORMAL H
WBC # BLD AUTO: 3.3 10E9/L (ref 4–11)

## 2017-08-13 PROCEDURE — 00000146 ZZHCL STATISTIC GLUCOSE BY METER IP

## 2017-08-13 PROCEDURE — 36592 COLLECT BLOOD FROM PICC: CPT | Performed by: TRANSPLANT SURGERY

## 2017-08-13 PROCEDURE — 25000132 ZZH RX MED GY IP 250 OP 250 PS 637: Performed by: TRANSPLANT SURGERY

## 2017-08-13 PROCEDURE — 80048 BASIC METABOLIC PNL TOTAL CA: CPT | Performed by: STUDENT IN AN ORGANIZED HEALTH CARE EDUCATION/TRAINING PROGRAM

## 2017-08-13 PROCEDURE — 25000128 H RX IP 250 OP 636: Performed by: STUDENT IN AN ORGANIZED HEALTH CARE EDUCATION/TRAINING PROGRAM

## 2017-08-13 PROCEDURE — 25000132 ZZH RX MED GY IP 250 OP 250 PS 637: Performed by: PHYSICIAN ASSISTANT

## 2017-08-13 PROCEDURE — 25000132 ZZH RX MED GY IP 250 OP 250 PS 637: Performed by: SURGERY

## 2017-08-13 PROCEDURE — 12000025 ZZH R&B TRANSPLANT INTERMEDIATE

## 2017-08-13 PROCEDURE — 85027 COMPLETE CBC AUTOMATED: CPT | Performed by: STUDENT IN AN ORGANIZED HEALTH CARE EDUCATION/TRAINING PROGRAM

## 2017-08-13 PROCEDURE — 25000132 ZZH RX MED GY IP 250 OP 250 PS 637: Performed by: COLON & RECTAL SURGERY

## 2017-08-13 PROCEDURE — 40000802 ZZH SITE CHECK

## 2017-08-13 PROCEDURE — 36415 COLL VENOUS BLD VENIPUNCTURE: CPT | Performed by: STUDENT IN AN ORGANIZED HEALTH CARE EDUCATION/TRAINING PROGRAM

## 2017-08-13 PROCEDURE — 87493 C DIFF AMPLIFIED PROBE: CPT | Performed by: COLON & RECTAL SURGERY

## 2017-08-13 PROCEDURE — 25000132 ZZH RX MED GY IP 250 OP 250 PS 637: Performed by: NURSE PRACTITIONER

## 2017-08-13 PROCEDURE — 25000132 ZZH RX MED GY IP 250 OP 250 PS 637: Performed by: STUDENT IN AN ORGANIZED HEALTH CARE EDUCATION/TRAINING PROGRAM

## 2017-08-13 PROCEDURE — 25000128 H RX IP 250 OP 636: Performed by: NURSE PRACTITIONER

## 2017-08-13 PROCEDURE — 25000125 ZZHC RX 250: Performed by: PHYSICIAN ASSISTANT

## 2017-08-13 PROCEDURE — 80197 ASSAY OF TACROLIMUS: CPT | Performed by: STUDENT IN AN ORGANIZED HEALTH CARE EDUCATION/TRAINING PROGRAM

## 2017-08-13 PROCEDURE — P9047 ALBUMIN (HUMAN), 25%, 50ML: HCPCS | Performed by: STUDENT IN AN ORGANIZED HEALTH CARE EDUCATION/TRAINING PROGRAM

## 2017-08-13 PROCEDURE — 83735 ASSAY OF MAGNESIUM: CPT | Performed by: STUDENT IN AN ORGANIZED HEALTH CARE EDUCATION/TRAINING PROGRAM

## 2017-08-13 PROCEDURE — 27210432 ZZH NUTRITION PRODUCT RENAL BASIC LITER

## 2017-08-13 PROCEDURE — 84100 ASSAY OF PHOSPHORUS: CPT | Performed by: STUDENT IN AN ORGANIZED HEALTH CARE EDUCATION/TRAINING PROGRAM

## 2017-08-13 PROCEDURE — 25000128 H RX IP 250 OP 636: Performed by: PHYSICIAN ASSISTANT

## 2017-08-13 PROCEDURE — 83605 ASSAY OF LACTIC ACID: CPT | Performed by: TRANSPLANT SURGERY

## 2017-08-13 PROCEDURE — 27210913 ZZH NUTRITION PRODUCT INTERMEDIATE PACKET

## 2017-08-13 RX ORDER — ALBUMIN (HUMAN) 12.5 G/50ML
12.5 SOLUTION INTRAVENOUS ONCE
Status: COMPLETED | OUTPATIENT
Start: 2017-08-13 | End: 2017-08-13

## 2017-08-13 RX ORDER — SODIUM BICARBONATE 650 MG/1
1300 TABLET ORAL 3 TIMES DAILY
Status: DISCONTINUED | OUTPATIENT
Start: 2017-08-13 | End: 2017-08-29

## 2017-08-13 RX ORDER — SACCHAROMYCES BOULARDII 250 MG
250 CAPSULE ORAL 2 TIMES DAILY
Status: DISCONTINUED | OUTPATIENT
Start: 2017-08-13 | End: 2017-08-29

## 2017-08-13 RX ADMIN — Medication 1 PACKET: at 09:29

## 2017-08-13 RX ADMIN — LOPERAMIDE HYDROCHLORIDE 4 MG: 2 SOLUTION ORAL at 08:41

## 2017-08-13 RX ADMIN — ACETAMINOPHEN 650 MG: 325 SOLUTION ORAL at 20:43

## 2017-08-13 RX ADMIN — SODIUM BICARBONATE 650 MG TABLET 1300 MG: at 14:21

## 2017-08-13 RX ADMIN — LINEZOLID 600 MG: 600 INJECTION, SOLUTION INTRAVENOUS at 11:17

## 2017-08-13 RX ADMIN — Medication 5 ML: at 20:07

## 2017-08-13 RX ADMIN — ACETAMINOPHEN 650 MG: 325 SOLUTION ORAL at 09:29

## 2017-08-13 RX ADMIN — Medication 80 MG: at 08:41

## 2017-08-13 RX ADMIN — PIPERACILLIN AND TAZOBACTAM 3.38 G: 3; .375 INJECTION, POWDER, LYOPHILIZED, FOR SOLUTION INTRAVENOUS; PARENTERAL at 06:41

## 2017-08-13 RX ADMIN — PIPERACILLIN AND TAZOBACTAM 3.38 G: 3; .375 INJECTION, POWDER, LYOPHILIZED, FOR SOLUTION INTRAVENOUS; PARENTERAL at 19:09

## 2017-08-13 RX ADMIN — Medication 250 MG: at 20:13

## 2017-08-13 RX ADMIN — SODIUM BICARBONATE 650 MG TABLET 650 MG: at 08:50

## 2017-08-13 RX ADMIN — FLUDROCORTISONE ACETATE 0.1 MG: 0.1 TABLET ORAL at 08:50

## 2017-08-13 RX ADMIN — HUMAN INSULIN 7 UNITS/HR: 100 INJECTION, SOLUTION SUBCUTANEOUS at 21:08

## 2017-08-13 RX ADMIN — ASCORBIC ACID TAB 250 MG 250 MG: 250 TAB at 09:30

## 2017-08-13 RX ADMIN — MICONAZOLE NITRATE: 20 CREAM TOPICAL at 08:54

## 2017-08-13 RX ADMIN — PIPERACILLIN AND TAZOBACTAM 3.38 G: 3; .375 INJECTION, POWDER, LYOPHILIZED, FOR SOLUTION INTRAVENOUS; PARENTERAL at 00:22

## 2017-08-13 RX ADMIN — Medication 220 MG: at 09:30

## 2017-08-13 RX ADMIN — TACROLIMUS 120 MCG/HR: 5 INJECTION, SOLUTION INTRAVENOUS at 11:29

## 2017-08-13 RX ADMIN — ALBUMIN (HUMAN) 12.5 G: 12.5 SOLUTION INTRAVENOUS at 17:04

## 2017-08-13 RX ADMIN — OXYCODONE HYDROCHLORIDE 5 MG: 5 SOLUTION ORAL at 02:45

## 2017-08-13 RX ADMIN — SODIUM BICARBONATE 650 MG TABLET 1300 MG: at 20:09

## 2017-08-13 RX ADMIN — Medication 1 PACKET: at 14:22

## 2017-08-13 RX ADMIN — LINEZOLID 600 MG: 600 INJECTION, SOLUTION INTRAVENOUS at 22:52

## 2017-08-13 RX ADMIN — Medication 250 MG: at 12:29

## 2017-08-13 RX ADMIN — Medication 1 PACKET: at 19:13

## 2017-08-13 RX ADMIN — GANCICLOVIR SODIUM 650 MG: 500 INJECTION, POWDER, LYOPHILIZED, FOR SOLUTION INTRAVENOUS at 14:15

## 2017-08-13 RX ADMIN — Medication 5 ML: at 08:41

## 2017-08-13 RX ADMIN — FLUDROCORTISONE ACETATE 0.1 MG: 0.1 TABLET ORAL at 20:09

## 2017-08-13 RX ADMIN — PANTOPRAZOLE SODIUM 40 MG: 40 TABLET, DELAYED RELEASE ORAL at 08:41

## 2017-08-13 RX ADMIN — PIPERACILLIN AND TAZOBACTAM 3.38 G: 3; .375 INJECTION, POWDER, LYOPHILIZED, FOR SOLUTION INTRAVENOUS; PARENTERAL at 12:32

## 2017-08-13 RX ADMIN — OXYCODONE HYDROCHLORIDE 5 MG: 5 SOLUTION ORAL at 12:29

## 2017-08-13 RX ADMIN — LOPERAMIDE HYDROCHLORIDE 4 MG: 2 SOLUTION ORAL at 12:29

## 2017-08-13 RX ADMIN — Medication 5 ML: at 09:30

## 2017-08-13 RX ADMIN — LOPERAMIDE HYDROCHLORIDE 4 MG: 2 SOLUTION ORAL at 20:09

## 2017-08-13 RX ADMIN — LOPERAMIDE HYDROCHLORIDE 4 MG: 2 SOLUTION ORAL at 16:06

## 2017-08-13 RX ADMIN — OXYCODONE HYDROCHLORIDE 5 MG: 5 SOLUTION ORAL at 22:23

## 2017-08-13 RX ADMIN — SULFAMETHOXAZOLE AND TRIMETHOPRIM 80 MG: 200; 40 SUSPENSION ORAL at 08:41

## 2017-08-13 RX ADMIN — MICONAZOLE NITRATE: 20 CREAM TOPICAL at 20:18

## 2017-08-13 RX ADMIN — OXYCODONE HYDROCHLORIDE 5 MG: 5 SOLUTION ORAL at 05:31

## 2017-08-13 ASSESSMENT — PAIN DESCRIPTION - DESCRIPTORS: DESCRIPTORS: ACHING

## 2017-08-13 NOTE — PROGRESS NOTES
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  Now s/p ex lap with R hemicolectomy, end ileostomy, mucus fistula, partial omenectomy on 7/26.    TF not at goal yet. Sugars well controlled on insulin drip.     Plan:  - Continue insulin infusion per protocol for now.   - Aspart 1unit/7g CHO ordered for meals and snacks.     Please notify diabetes team of changes planned for nutrition support schedule.     Will continue to follow.    Patient was discussed with Dr Gisella Espino.    Terri De Guzman  Endocrinology Fellow  [phone]         Interval History:   The last 24 hours progress and nursing notes reviewed.   TF not at goal yet. Sugars well controlled. No acute events ON.    Recent Labs  Lab 08/13/17  1413 08/13/17  1316 08/13/17  1212 08/13/17  1115 08/13/17  1021 08/13/17  0928  08/13/17  0631  08/12/17  0630  08/11/17  0650  08/10/17  0704  08/09/17  2024  08/09/17  0711   GLC  --   --   --   --   --   --   --  113*  --  147*  --  112*  --  121*  --  176*  --  187*   * 188* 182* 176* 183* 179*  < >  --   < >  --   < >  --   < >  --   < >  --   < >  --    < > = values in this interval not displayed.         Review of Systems:   See interval hx          Medications:   PTA taking Januvia and glargine versus glargine and aspart per carb    Active Diet Order      Advance Diet as Tolerated: Clear Liquid Diet     Physical Exam:  Gen: NAD.   HEENT: NC/AT  Resp: unlabored at rest  Neuro: oriented x 2  /89 (BP Location: Left arm)  Pulse 90  Temp 97.6  F (36.4  C) (Oral)  Resp 18  Ht 1.829 m (6')  Wt 86.9 kg (191 lb 8 oz)  SpO2 100%  BMI 25.97 kg/m2          Data:     Lab Results   Component Value Date    A1C  07/06/2017     Canceled, Test credited   Below Assay Range  NOTIFIED ADELSOFERNANDO ONEILL AT 0538 ON 7/6/17 BY CHRISSY      A1C 9.4 02/16/2017    A1C 6.2 12/14/2016    A1C 6.1 10/27/2016    A1C 8.3 07/02/2012        Recent Labs   Lab Test  08/11/17   0650  08/10/17   2135   08/10/17   0704   NA  140   --    --   140   POTASSIUM  4.9  5.1   < >  5.5*   CHLORIDE  114*   --    --   112*   CO2  19*   --    --   18*   ANIONGAP  8   --    --   10   GLC  112*   --    --   121*   BUN  64*   --    --   61*   CR  1.82*   --    --   1.70*   JACOB  8.5   --    --   8.5    < > = values in this interval not displayed.     ATTENDING NOTE   I have reviewed chart, discussed case with fellow, and agree with the plan of care.    Gisella Espino MD    Division of Diabetes and Endocrinology  Department of Medicine  [phone]

## 2017-08-13 NOTE — PLAN OF CARE
Problem: Goal Outcome Summary  Goal: Goal Outcome Summary  VSS except slightly tachy, especially with activity. Weaned down to 2L O2, sats stable. Pt mostly calm today, appears comfortable and slept off and on. C/o abd pain this morning, tylenol given x1 w/ relief. C/o constant back pain, oxy given x1 this afternoon with relief. Pt more sleepy after oxy. Does c/o pain with any movement or activity. TF at goal 65/hr. Large output from ileostomy. Cdiff sent, negative. Florastor started. Insulin infusing, blood sugars 131-184 today. Increased to alg #3. Pt was sipping on clear liquids occasionally, unable to give novolog carb coverage. Pt confused, oriented to self and place. Speech sometimes clear, often illogical. Dangled at edge of bed x1, pt quickly insisted on laying back down. Continue to encourage activity.

## 2017-08-13 NOTE — PROGRESS NOTES
Immunosuppression Note:    Camacho Bhagat is a 51 year old male who is seen today  for immunosuppression management     I, Jovan Tran MD, I have examined the patient with the resident/PA/Fellow, discussed and agree with the note and findings.  I have reviewed today's vital signs, medications, labs and imaging. I reviewed the immunosuppression medications and levels. 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 Concern for CMV colitis with low count CMV viremia/GI PTLD and subsequently underwent colonoscopy on 7/21, random biopsies obtained.  On 7/25/2017 patient had respiratory distress, abdominal compartment syndrome and EJ with oliguria. Broad spectrum antibiotics were started.  He was intubated and had a paracentesis with 5L removed. He did not required pressors.  CT was obtained which showed a new large heterogeneous right sided retroperitoneal gas and air tracking along duodenum.  No contrast extravasation.  No intraperitoneal air noted. Colorectal surgery was consulted. Initial conservative management with bowel rest and IV abx. Pt continued to spike fevers despite IV abx.  Now s/p Exploratory laparotomy, right angelique-colectomy, end ileostomy, mucus fistula, partial omentectomy on 7/26.    Graft Function: Good function. LFTs acceptable (checking every Monday, Thursday).   Immunosuppression Management:   CellCept discontinued (6/27/17) due to neutropenia.   Prograf goal ~5-6 due to sepsis.  Unable to obtain  detectable level with suspension.  Level 9.4 on tacro gtt yesterday, decreased gtt to 120/hr  Cardiorespiratory: Stable respiratory status, NLB on RA  Hematology: Pancytopenia on admission, acute on chronic, secondary to medications (MMF, bactrim, valcyte). Improved with holding medications and neupogen. 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  Continue to hold MMF and bactrim.   -Anemia- Hemoglobin stable. WBC stable. Last blood transfusion on 7/26.   GI:   -Neutropenic colitis on admission. Colonoscopy 7/21. Biopsy: resolving injury secondary to possible drug induced vs infectious.   -Bowel perforation: 7/25 CT scan abd/pelvis, po contrast, showed a new large heterogeneous right sided retroperitoneal gas and air tracking along duodenum.  No contrast extravasation.  No intraperitoneal air noted. Colorectal surgery consulted.  Continued to spike high grade temperature despite IV antibiotics therefore patient taken to OR. s/p Exploratory laparotomy, right angelique-colectomy, end ileostomy, mucus fistula, partial omentectomy on 7/26. Findings no neida perforation, phlegmon/inflammationright colon. Colon pathology suggested colon perforation, negative for malignancy; CMV stain positive.    -Diet:  tube feeds increased up to 35 cc this AM, reports tolerating well, and will add a liquid diet today.  -High output  ileostomy:  Goal ileostomy output ~ 1000 cc in 24 hrs.   Loperamide 2mg q6H, fiber TID, Lomotil BID 5 mg. Will consider adding tincture   R/o peritonitis or bowel perforation:  CT A/P 8/9 showed a persistent gas/fluid collection RLQ, peritonitis, some free air but no evidence of contrast extravasation.  Diagnostic para today in IR.  Fluid/Electrolytes/Renal:   -Dehydration:  Tachycardic  -EJ: on admission, likely sec to high intraabdominal pressures and ischemic ATN sec to sepsis. Improved, but now Cr increasing again.  Concern for sepsis.  Nephrology  consulted.  -Hypernatremia: resolved with free water.    -Hyperkalemia: on florinef, increased 8/9.  K cocktail discontinued.  Avoid kayexalate due to recent bowel perforation.    Endocrine: DM type 2. Endocrine consulting for glycemic management.  On lantus/NPH.  BG trending up, concerning for infection.  Infectious disease:  ID following.  Tmax 99.7F.  WBC 3.3 today.  -Fever:  Procalcitonin elevated.  CT C/A/P 8/9 showed a persistent gas/fluid collection RLQ, peritonitis, some free air but no evidence of contrast extravasation.  IR placed RP drain, growing clostridium- on zosyn, linezolid.  -CMV viremia: Primary CMV infection.  CMV colitis per pathology, peak serum 7/15 4225 IU/ml.  Continue IV ganciclovir. Follow CMV PCR weekly, now <137 x 2 weeks  -EBV viremia:  Peak serum 406,697 copies/ml on 7/18.  Down to 6864 as of 8/1.  Check weekly.  -R/o SBP or abscess:  8/5 paracentesis with 250ml removed.  , cx negative.    Resolved issues:  -Severe sepsis: On admission, secondary to right colon phlegmon. Meropenem/daptomycin/micafungin tx x 10 days completed on 8/3. S/p right angelique-colectomy.  Path: CMV stain +, perforation of colon noted.  7/5 Fluid culture + staph capitis broth only (contamination).   Neuro: Toxic metabolic encephalopathy secondary to infection, prolonged hospital stay.  Delirious state is improving today.  Vascular:  Evidence of microvascular injury in digits (toes) this is most likely due to injury while patient was on pressor therapy with sepsis.  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   Activity: Deconditioned due to prolong hospital course. Daily PT/OT, encourage pt to be OOB to chair. Encourage pulmonary toilet.   Prophylaxis: DVT-Heparin SQ  Disposition: 7A.     Medical Decision Making: Medium    PILLO/Fellow/Resident Provider:  Perico Garcia   Surgery PGY3  [phone]      Faculty: Jovan Tran  MD    __________________________________________________________________  Transplant History:.  3/4/2017 DD OLT with Dr. Tran (Liver), Postoperative day: 162     Interval History:   Overnight events: no acute events overnight; reports feeling almost the same as yesterday; requests a regular diet.     ROS:   A 10-point review of systems was negative except as noted above.    Meds:    ascorbic acid  250 mg Oral Daily     sulfamethoxazole-trimethoprim  10 mL Oral Daily     ganciclovir (CYTOVENE) intermittent infusion  7.5 mg/kg Intravenous Q24H     miconazole with skin protectant   Topical BID     sodium bicarbonate  650 mg Per NG tube TID     linezolid  600 mg Intravenous Q12H     piperacillin-tazobactam  3.375 g Intravenous Q6H     sodium chloride (PF)  10 mL Irrigation Q8H     fludrocortisone  0.1 mg Oral BID     multivitamin CF formula  5 mL Per Feeding Tube Daily     zinc sulfate  220 mg Per Feeding Tube Daily     diphenoxylate-atropine  5 mL Oral BID     sodium chloride (PF)  3 mL Intracatheter Q8H     loperamide  4 mg Oral or Feeding Tube 4x Daily     insulin aspart   Subcutaneous TID w/meals     protein modular  1 packet Per Feeding Tube TID     aspirin  80 mg Oral Daily     pantoprazole  40 mg Oral or Feeding Tube Daily     sodium chloride (PF)  3 mL Intracatheter Q8H       Physical Exam:     Admit Weight: 94.2 kg (207 lb 11.2 oz) (SCALE 2)    Current vitals:   /80 (BP Location: Left arm)  Pulse 95  Temp 97.7  F (36.5  C) (Oral)  Resp 20  Ht 1.829 m (6')  Wt 86.9 kg (191 lb 8 oz)  SpO2 96%  BMI 25.97 kg/m2    Vital sign ranges:    Temp:  [97.7  F (36.5  C)-98.4  F (36.9  C)] 97.7  F (36.5  C)  Pulse:  [95] 95  Heart Rate:  [92-98] 98  Resp:  [20-22] 20  BP: (146-170)/(77-95) 151/80  SpO2:  [94 %-97 %] 96 %  Patient Vitals for the past 24 hrs:   BP Temp Temp src Pulse Heart Rate Resp SpO2 Weight   08/13/17 0400 151/80 97.7  F (36.5  C) Oral - 98 20 96 % -   08/12/17 2356 146/77 98.3  F (36.8   C) Oral - 92 22 96 % -   08/12/17 1852 148/80 98.4  F (36.9  C) Oral 95 - 20 94 % -   08/12/17 1720 - - - - - - - 86.9 kg (191 lb 8 oz)   08/12/17 1520 161/83 97.9  F (36.6  C) Oral - 97 20 97 % -   08/12/17 1203 (!) 170/95 97.8  F (36.6  C) Oral - 96 20 97 % -     General Appearance: NAD  Skin: normal, warm, dry  Heart: sinus tach 100-110  Lungs: productive cough, diminished bilateral  Abdomen: soft, rounded, more tender. Ileostomy with brown output. Mucus fistula covered. Midline incision, c/d/i.   : No vazquez.   Extremities: No edema.  Necrotic blackened areas on right 1st & 2nd toes and left 2nd toe.  Neurologic: A&O x 2, waxes and wanes.       Data:   CMP    Recent Labs  Lab 08/13/17  0631 08/12/17  0630 08/11/17  1310  08/10/17  0704  08/08/17  0600    143  --   < > 140  < > 136   POTASSIUM 4.5 4.4  --   < > 5.5*  < > 5.8*   CHLORIDE 116* 115*  --   < > 112*  < > 111*   CO2 17* 22  --   < > 18*  < > 17*   * 147*  --   < > 121*  < > 152*   BUN 66* 68*  --   < > 61*  < > 55*   CR 1.94* 1.82*  --   < > 1.70*  < > 1.18   GFRESTIMATED 36* 39*  --   < > 42*  < > 65   GFRESTBLACK 44* 47*  --   < > 51*  < > 78   JACOB 8.4* 8.6  --   < > 8.5  < > 8.2*   MAG 2.1 2.1  --   < > 1.9  < > 2.0   PHOS 5.6* 5.7*  --   < > 4.6*  < > 4.6*   ALBUMIN  --   --   --   --  1.9*  --  2.1*   BILITOTAL  --   --   --   --  2.2*  --  0.5   ALKPHOS  --   --   --   --  136  --  172*   AST  --   --   --   --  36  --  28   ALT  --   --   --   --  26  --  25   FAMY  --   --  19  --   --   --   --    < > = values in this interval not displayed.  CBC    Recent Labs  Lab 08/13/17  0631 08/12/17  0630   HGB 8.4* 7.9*   WBC 3.3* 2.8*    132*     COAGS  No lab results found in last 7 days.    Invalid input(s): XA   Urinalysis  Recent Labs   Lab Test  08/10/17   1752  08/08/17   1600   06/14/17   1508   04/11/16   1345   COLOR  Yellow  Yellow   < >   --    < >  Yellow   APPEARANCE  Slightly Cloudy  Slightly Cloudy   < >   --    <  ">  Clear   URINEGLC  Negative  Negative   < >   --    < >  30*   URINEBILI  Small   This is an unconfirmed screening test result. A positive result may be false.  *  Negative   < >   --    < >  Negative   URINEKETONE  Negative  Negative   < >   --    < >  Negative   SG  1.012  1.010   < >   --    < >  1.016   UBLD  Negative  Negative   < >   --    < >  Small*   URINEPH  5.0  5.0   < >   --    < >  5.0   PROTEIN  30*  30*   < >   --    < >  30*   NITRITE  Negative  Negative   < >   --    < >  Negative   LEUKEST  Negative  Negative   < >   --    < >  Negative   RBCU  0  3*   < >   --    < >  1   WBCU  10*  7*   < >   --    < >  1   UTPG   --    --    --   1.55*   --   0.41*    < > = values in this interval not displayed.          7/21/2017   SPECIMEN(S):   A: Colon biopsy, ascending   B: Colon biopsy, descending     FINAL DIAGNOSIS:   A. COLON BIOPSY, ASCENDING:   - Colonic mucosa with no significant histologic abnormality   - Negative for intraepithelial lymphocytosis or deposition of   subepithelial thick collagenous band     B. COLON BIOPSY, DESCENDING:   - Crypt architecture distortion, consistent with healed prior injury   - Negative for active inflammation or dysplasia   - Negative for intraepithelial lymphocytosis or deposition of   subepithelial thick collagenous band   - Please, see comment     COMMENT:   Specimen B, \"descending colon\" features lamina propria edema, slight   variation in crypt sizes and shapes and occasional crypt drop-out.  This   may indicate a resolving injury which could be drug-induced or   infectious.       "

## 2017-08-13 NOTE — PROGRESS NOTES
Colon & Rectal Surgery Progress Note    Subjective:   Pain tolerable. David TFs well.     Objective: BP (!) 147/94 (BP Location: Left arm)  Pulse 95  Temp 97.3  F (36.3  C) (Oral)  Resp 20  Ht 6'  Wt 191 lb 8 oz  SpO2 98%  BMI 25.97 kg/m2     Recent Labs   Lab Test  08/13/17   0631   WBC  3.3*   RBC  2.96*   HGB  8.4*   HCT  26.4*   MCV  89   MCH  28.4   MCHC  31.8   RDW  17.8*   PLT  156       Intake/Output Summary (Last 24 hours) at 08/13/17 1140  Last data filed at 08/13/17 1000   Gross per 24 hour   Intake             2725 ml   Output             3315 ml   Net             -590 ml       Abdomen: soft, appr tender, incisions clean. Stoma viable and functioning (1500mL/24h). Drain: 335mL/24h.    Assessment:  POD#18.    Plan:  - CLD + TFs.  - Abx per ID.  - Immunosuppression per transplant.  - Agree with max Imodium and adding Lomotil. Will send C diff.  - Appreciate [facility] cares and teaching.    Cristopher Willingham M.D., M.Sc.    Colon and Rectal Surgery  Pager: [phone].

## 2017-08-13 NOTE — PLAN OF CARE
Problem: Individualization  Goal: Patient Preferences  Outcome: No Change     RN:  AVSS, generalized pain controlled with Oxycodone, given x2, denies nausea. Sepsis protocol was activated, Lactic acid 1.0, no intervention needed. TF @55cc/hr, to be increased to goal rate @65cc/hr at 0800, blood glucose 's, Insulin gtt. @ 1 to 3.5 units/hr. Right drain with minimal amount of serous output, irrigated x1, Ileostomy with moderate amount of watery/loose output, and urethral catheter with adequate amount of urine. Patient repositioned multiple times, awake most of the shift. Will continue to monitor.

## 2017-08-13 NOTE — PROGRESS NOTES
Nephrology Progress Note  08/13/2017         Assessment & Recommendations:   Camacho Bhagat is a 51 year old year old male    Camacho Bhagat is a 51 year old male with h/o ESLD s/p liver Tx 3/17, DM2, HTN, and RA who presented 7/4 with neutropenic colitis and is now s/p colectomy and more recently with c/f intra-abdominal infection s/p drain placement 8/9 with chronic hyperkalemia and EJ.      # EJ on baseline renal failure since liver transplant  Suspect ATN with infection/sepsis, contrast exposure and Prograf renal toxicity associated decline (vasocontriction and chronic effects as well). Good urine output.  UA WBCs/bacteria.  -send F/U urine culture to rule out infx.  -Tacrolimus dosing per primary team.     # Lytes  hyperkalemia (chronic), improving   Pt with hyperkalemia for months fitting a type 4 RTA from his Prograf. Additional contributions from EJ and elevated blood sugars (lack of adequate insulin) as well. Possible acidosis contribution as well with low bicarb, recommend checking ABG. Given downtrending to near normal levels now will not recommend further shifting at this time, rather CTM potassium. Gentle hydration and/or Lasix can also be used to help excrete potassium, but K-cocktail fluids did not make significant difference and will hold off on those two options at this time.   -decrease Florinef to 0.1mg daily (BPs trending higher).  -agree with Endo consult for improved BS control    #acidosis, stool output increased,  ABG corresponds to non anion met. acidosis.  -increase sodium bicarb tabs 1300mg PO TID   -diarrhea mgmt per primary team.     # volume status  Pt appears euvolemic without edema on minimal oxygen. Fluid resuscitated and adequate fluid trial for EJ per I/Os. Recommend being wary of additional IVFs (other than bolus IVFs for hypotension should that occur) pending his I/Os to monitor his UOP in EJ.      #htn-mild but trending up, likely related to florinef at this time  -reduce  dose as above.    #bmd  Hyperphosphatemia - due to EJ, would not give binder at this time.    -if eating, needs phos restriction.  If on tube feeds, would use nepro     Patient seen and discussed with Dr. Acuna.  Recommendation relayed to primary team via note.  COLETTE ShermanCarondelet St. Joseph's Hospitalaraseli  [facility]  Department of Medicine  Division of Renal Disease and Hypertension  [phone]    Interval History :   In the last 24 hours Camacho Bhagat denies new pain sx, still discomfort in abdomen and diarrhea is back.  Wishes to drink but is NPO at this time.  sBPs 140-160s, good urine output.  No fevers, vomiting, urinary sx, or rash.    Review of Systems:   I reviewed the following systems:  GI: intermittent nausea and  High volume diarrhea. No vomiting.   Neuro:  no confusion  Constitutional:  no fever or chills  CV: no dyspnea or edema or chest pain.    Physical Exam:   I/O last 3 completed shifts:  In: 2750 [P.O.:700; I.V.:600; Other:20; NG/GT:150]  Out: 4410 [Urine:1525; Drains:285; Stool:2600]   /82 (BP Location: Left arm)  Pulse 101  Temp 98.4  F (36.9  C) (Oral)  Resp 18  Ht 1.829 m (6')  Wt 86.9 kg (191 lb 8 oz)  SpO2 94%  BMI 25.97 kg/m2     GENERAL APPEARANCE: alert  EYES:  no scleral icterus, pupils equal  HENT: mouth without ulcers or lesions  PULM: lungs clear to auscultation,  bilaterally, equal air movement, no clubbing  CV: regular rhythm, normal rate, no rub     -JVP      -edema none    GI: soft, none tender, mildly distended, bowel sounds are +ve  INTEGUMENT: no cyanosis, no rash  NEURO:  no asterixis     Labs:   All labs reviewed by me.    Electrolytes/Renal -   Recent Labs   Lab Test  08/13/17   0631  08/12/17   0630  08/11/17   0650   NA  142  143  140   POTASSIUM  4.5  4.4  4.9   CHLORIDE  116*  115*  114*   CO2  17*  22  19*   BUN  66*  68*  64*   CR  1.94*  1.82*  1.82*   GLC  113*  147*  112*   JACOB  8.4*  8.6  8.5   MAG  2.1  2.1  2.0   PHOS  5.6*  5.7*  5.4*        CBC -   Recent Labs   Lab Test  08/13/17   0631  08/12/17   0630  08/11/17   0650   WBC  3.3*  2.8*  4.3   HGB  8.4*  7.9*  7.9*   PLT  156  132*  140*       LFTs -   Recent Labs   Lab Test  08/10/17   0704  08/08/17   0600  08/02/17   0543   ALKPHOS  136  172*  199*   BILITOTAL  2.2*  0.5  0.4   ALT  26  25  42   AST  36  28  62*   PROTTOTAL  6.0*  6.4*  6.1*   ALBUMIN  1.9*  2.1*  2.3*       Iron Panel -   Recent Labs   Lab Test  02/08/16   1144   IRON  93   IRONSAT  41         Imaging:    Current Medications:    saccharomyces boulardii  250 mg Oral BID     sodium bicarbonate  1,300 mg Per NG tube TID     ascorbic acid  250 mg Oral Daily     sulfamethoxazole-trimethoprim  10 mL Oral Daily     ganciclovir (CYTOVENE) intermittent infusion  7.5 mg/kg Intravenous Q24H     miconazole with skin protectant   Topical BID     linezolid  600 mg Intravenous Q12H     piperacillin-tazobactam  3.375 g Intravenous Q6H     sodium chloride (PF)  10 mL Irrigation Q8H     fludrocortisone  0.1 mg Oral BID     multivitamin CF formula  5 mL Per Feeding Tube Daily     zinc sulfate  220 mg Per Feeding Tube Daily     diphenoxylate-atropine  5 mL Oral BID     sodium chloride (PF)  3 mL Intracatheter Q8H     loperamide  4 mg Oral or Feeding Tube 4x Daily     insulin aspart   Subcutaneous TID w/meals     protein modular  1 packet Per Feeding Tube TID     aspirin  80 mg Oral Daily     pantoprazole  40 mg Oral or Feeding Tube Daily     sodium chloride (PF)  3 mL Intracatheter Q8H       insulin (regular) 4 Units/hr (08/13/17 1413)     NaCl 10 mL/hr at 08/12/17 0800     tacrolimus (Prograf) infusion ADULT 120 mcg/hr (08/13/17 1129)     - MEDICATION INSTRUCTIONS -       IV fluid REPLACEMENT ONLY Stopped (08/09/17 1808)     Rachelle Mckeon PA-C

## 2017-08-14 ENCOUNTER — APPOINTMENT (OUTPATIENT)
Dept: CT IMAGING | Facility: CLINIC | Age: 52
End: 2017-08-14
Attending: TRANSPLANT SURGERY
Payer: COMMERCIAL

## 2017-08-14 ENCOUNTER — APPOINTMENT (OUTPATIENT)
Dept: PHYSICAL THERAPY | Facility: CLINIC | Age: 52
End: 2017-08-14
Attending: TRANSPLANT SURGERY
Payer: COMMERCIAL

## 2017-08-14 LAB
ALBUMIN SERPL-MCNC: 1.8 G/DL (ref 3.4–5)
ALP SERPL-CCNC: 271 U/L (ref 40–150)
ALT SERPL W P-5'-P-CCNC: 17 U/L (ref 0–70)
ANION GAP SERPL CALCULATED.3IONS-SCNC: 13 MMOL/L (ref 3–14)
AST SERPL W P-5'-P-CCNC: 24 U/L (ref 0–45)
BACTERIA SPEC CULT: ABNORMAL
BILIRUB DIRECT SERPL-MCNC: 0.4 MG/DL (ref 0–0.2)
BILIRUB SERPL-MCNC: 0.7 MG/DL (ref 0.2–1.3)
BUN SERPL-MCNC: 71 MG/DL (ref 7–30)
CALCIUM SERPL-MCNC: 8.4 MG/DL (ref 8.5–10.1)
CHLORIDE SERPL-SCNC: 114 MMOL/L (ref 94–109)
CO2 SERPL-SCNC: 16 MMOL/L (ref 20–32)
CREAT SERPL-MCNC: 1.92 MG/DL (ref 0.66–1.25)
ERYTHROCYTE [DISTWIDTH] IN BLOOD BY AUTOMATED COUNT: 17.8 % (ref 10–15)
FUNGUS SPEC CULT: ABNORMAL
GFR SERPL CREATININE-BSD FRML MDRD: 37 ML/MIN/1.7M2
GLUCOSE BLDC GLUCOMTR-MCNC: 103 MG/DL (ref 70–99)
GLUCOSE BLDC GLUCOMTR-MCNC: 109 MG/DL (ref 70–99)
GLUCOSE BLDC GLUCOMTR-MCNC: 113 MG/DL (ref 70–99)
GLUCOSE BLDC GLUCOMTR-MCNC: 113 MG/DL (ref 70–99)
GLUCOSE BLDC GLUCOMTR-MCNC: 117 MG/DL (ref 70–99)
GLUCOSE BLDC GLUCOMTR-MCNC: 117 MG/DL (ref 70–99)
GLUCOSE BLDC GLUCOMTR-MCNC: 125 MG/DL (ref 70–99)
GLUCOSE BLDC GLUCOMTR-MCNC: 128 MG/DL (ref 70–99)
GLUCOSE BLDC GLUCOMTR-MCNC: 130 MG/DL (ref 70–99)
GLUCOSE BLDC GLUCOMTR-MCNC: 131 MG/DL (ref 70–99)
GLUCOSE BLDC GLUCOMTR-MCNC: 134 MG/DL (ref 70–99)
GLUCOSE BLDC GLUCOMTR-MCNC: 137 MG/DL (ref 70–99)
GLUCOSE BLDC GLUCOMTR-MCNC: 150 MG/DL (ref 70–99)
GLUCOSE BLDC GLUCOMTR-MCNC: 150 MG/DL (ref 70–99)
GLUCOSE BLDC GLUCOMTR-MCNC: 151 MG/DL (ref 70–99)
GLUCOSE BLDC GLUCOMTR-MCNC: 152 MG/DL (ref 70–99)
GLUCOSE BLDC GLUCOMTR-MCNC: 154 MG/DL (ref 70–99)
GLUCOSE BLDC GLUCOMTR-MCNC: 160 MG/DL (ref 70–99)
GLUCOSE BLDC GLUCOMTR-MCNC: 163 MG/DL (ref 70–99)
GLUCOSE BLDC GLUCOMTR-MCNC: 170 MG/DL (ref 70–99)
GLUCOSE BLDC GLUCOMTR-MCNC: 170 MG/DL (ref 70–99)
GLUCOSE BLDC GLUCOMTR-MCNC: 182 MG/DL (ref 70–99)
GLUCOSE BLDC GLUCOMTR-MCNC: 192 MG/DL (ref 70–99)
GLUCOSE BLDC GLUCOMTR-MCNC: 200 MG/DL (ref 70–99)
GLUCOSE SERPL-MCNC: 133 MG/DL (ref 70–99)
HCT VFR BLD AUTO: 25.2 % (ref 40–53)
HGB BLD-MCNC: 8.1 G/DL (ref 13.3–17.7)
LACTATE BLD-SCNC: 0.7 MMOL/L (ref 0.7–2.1)
MAGNESIUM SERPL-MCNC: 2.1 MG/DL (ref 1.6–2.3)
MCH RBC QN AUTO: 29.2 PG (ref 26.5–33)
MCHC RBC AUTO-ENTMCNC: 32.1 G/DL (ref 31.5–36.5)
MCV RBC AUTO: 91 FL (ref 78–100)
MICRO REPORT STATUS: ABNORMAL
MICRO REPORT STATUS: ABNORMAL
PHOSPHATE SERPL-MCNC: 5.1 MG/DL (ref 2.5–4.5)
PLATELET # BLD AUTO: 129 10E9/L (ref 150–450)
POTASSIUM SERPL-SCNC: 4.4 MMOL/L (ref 3.4–5.3)
PROT SERPL-MCNC: 6.1 G/DL (ref 6.8–8.8)
RADIOLOGIST FLAGS: ABNORMAL
RBC # BLD AUTO: 2.77 10E12/L (ref 4.4–5.9)
SODIUM SERPL-SCNC: 143 MMOL/L (ref 133–144)
SPECIMEN SOURCE: ABNORMAL
SPECIMEN SOURCE: ABNORMAL
TACROLIMUS BLD-MCNC: 8.2 UG/L (ref 5–15)
TME LAST DOSE: NORMAL H
WBC # BLD AUTO: 3.4 10E9/L (ref 4–11)

## 2017-08-14 PROCEDURE — 25000128 H RX IP 250 OP 636: Performed by: STUDENT IN AN ORGANIZED HEALTH CARE EDUCATION/TRAINING PROGRAM

## 2017-08-14 PROCEDURE — 80076 HEPATIC FUNCTION PANEL: CPT | Performed by: STUDENT IN AN ORGANIZED HEALTH CARE EDUCATION/TRAINING PROGRAM

## 2017-08-14 PROCEDURE — 25000132 ZZH RX MED GY IP 250 OP 250 PS 637: Performed by: PHYSICIAN ASSISTANT

## 2017-08-14 PROCEDURE — 99214 OFFICE O/P EST MOD 30 MIN: CPT

## 2017-08-14 PROCEDURE — 85027 COMPLETE CBC AUTOMATED: CPT | Performed by: STUDENT IN AN ORGANIZED HEALTH CARE EDUCATION/TRAINING PROGRAM

## 2017-08-14 PROCEDURE — 36592 COLLECT BLOOD FROM PICC: CPT | Performed by: STUDENT IN AN ORGANIZED HEALTH CARE EDUCATION/TRAINING PROGRAM

## 2017-08-14 PROCEDURE — 25000132 ZZH RX MED GY IP 250 OP 250 PS 637: Performed by: TRANSPLANT SURGERY

## 2017-08-14 PROCEDURE — 84100 ASSAY OF PHOSPHORUS: CPT | Performed by: STUDENT IN AN ORGANIZED HEALTH CARE EDUCATION/TRAINING PROGRAM

## 2017-08-14 PROCEDURE — 40000193 ZZH STATISTIC PT WARD VISIT

## 2017-08-14 PROCEDURE — 12000024 ZZH R&B TRANSPLANT CRITICAL

## 2017-08-14 PROCEDURE — 00000146 ZZHCL STATISTIC GLUCOSE BY METER IP

## 2017-08-14 PROCEDURE — 97116 GAIT TRAINING THERAPY: CPT | Mod: GP

## 2017-08-14 PROCEDURE — 83605 ASSAY OF LACTIC ACID: CPT | Performed by: TRANSPLANT SURGERY

## 2017-08-14 PROCEDURE — P9047 ALBUMIN (HUMAN), 25%, 50ML: HCPCS | Performed by: STUDENT IN AN ORGANIZED HEALTH CARE EDUCATION/TRAINING PROGRAM

## 2017-08-14 PROCEDURE — 25000125 ZZHC RX 250: Performed by: PHYSICIAN ASSISTANT

## 2017-08-14 PROCEDURE — 80197 ASSAY OF TACROLIMUS: CPT | Performed by: STUDENT IN AN ORGANIZED HEALTH CARE EDUCATION/TRAINING PROGRAM

## 2017-08-14 PROCEDURE — 97530 THERAPEUTIC ACTIVITIES: CPT | Mod: GP

## 2017-08-14 PROCEDURE — 83735 ASSAY OF MAGNESIUM: CPT | Performed by: STUDENT IN AN ORGANIZED HEALTH CARE EDUCATION/TRAINING PROGRAM

## 2017-08-14 PROCEDURE — 25000132 ZZH RX MED GY IP 250 OP 250 PS 637: Performed by: STUDENT IN AN ORGANIZED HEALTH CARE EDUCATION/TRAINING PROGRAM

## 2017-08-14 PROCEDURE — 25000132 ZZH RX MED GY IP 250 OP 250 PS 637: Performed by: NURSE PRACTITIONER

## 2017-08-14 PROCEDURE — 36592 COLLECT BLOOD FROM PICC: CPT | Performed by: TRANSPLANT SURGERY

## 2017-08-14 PROCEDURE — 80048 BASIC METABOLIC PNL TOTAL CA: CPT | Performed by: STUDENT IN AN ORGANIZED HEALTH CARE EDUCATION/TRAINING PROGRAM

## 2017-08-14 PROCEDURE — 40000802 ZZH SITE CHECK

## 2017-08-14 PROCEDURE — 25000128 H RX IP 250 OP 636: Performed by: NURSE PRACTITIONER

## 2017-08-14 PROCEDURE — 25000132 ZZH RX MED GY IP 250 OP 250 PS 637: Performed by: COLON & RECTAL SURGERY

## 2017-08-14 PROCEDURE — 25000132 ZZH RX MED GY IP 250 OP 250 PS 637: Performed by: SURGERY

## 2017-08-14 PROCEDURE — 25000128 H RX IP 250 OP 636: Performed by: PHYSICIAN ASSISTANT

## 2017-08-14 PROCEDURE — 74176 CT ABD & PELVIS W/O CONTRAST: CPT

## 2017-08-14 RX ORDER — DIPHENOXYLATE HCL/ATROPINE 2.5-.025/5
5 LIQUID (ML) ORAL 4 TIMES DAILY
Status: DISCONTINUED | OUTPATIENT
Start: 2017-08-14 | End: 2017-08-23

## 2017-08-14 RX ORDER — GUAR GUM
1 PACKET (EA) ORAL 3 TIMES DAILY
Status: DISCONTINUED | OUTPATIENT
Start: 2017-08-14 | End: 2017-08-14

## 2017-08-14 RX ORDER — DIPHENOXYLATE HCL/ATROPINE 2.5-.025/5
5 LIQUID (ML) ORAL 3 TIMES DAILY
Status: DISCONTINUED | OUTPATIENT
Start: 2017-08-14 | End: 2017-08-14

## 2017-08-14 RX ORDER — ALBUMIN (HUMAN) 12.5 G/50ML
12.5 SOLUTION INTRAVENOUS ONCE
Status: COMPLETED | OUTPATIENT
Start: 2017-08-14 | End: 2017-08-14

## 2017-08-14 RX ORDER — GUAR GUM
1 PACKET (EA) ORAL 2 TIMES DAILY
Status: DISCONTINUED | OUTPATIENT
Start: 2017-08-14 | End: 2017-08-29

## 2017-08-14 RX ADMIN — LOPERAMIDE HYDROCHLORIDE 4 MG: 2 SOLUTION ORAL at 16:05

## 2017-08-14 RX ADMIN — Medication 220 MG: at 12:38

## 2017-08-14 RX ADMIN — SODIUM BICARBONATE 650 MG TABLET 1300 MG: at 14:21

## 2017-08-14 RX ADMIN — SODIUM BICARBONATE 650 MG TABLET 1300 MG: at 20:08

## 2017-08-14 RX ADMIN — HUMAN INSULIN 5 UNITS/HR: 100 INJECTION, SOLUTION SUBCUTANEOUS at 17:18

## 2017-08-14 RX ADMIN — PIPERACILLIN AND TAZOBACTAM 3.38 G: 3; .375 INJECTION, POWDER, LYOPHILIZED, FOR SOLUTION INTRAVENOUS; PARENTERAL at 00:10

## 2017-08-14 RX ADMIN — Medication 80 MG: at 09:06

## 2017-08-14 RX ADMIN — PIPERACILLIN AND TAZOBACTAM 3.38 G: 3; .375 INJECTION, POWDER, LYOPHILIZED, FOR SOLUTION INTRAVENOUS; PARENTERAL at 19:05

## 2017-08-14 RX ADMIN — OXYCODONE HYDROCHLORIDE 5 MG: 5 SOLUTION ORAL at 04:04

## 2017-08-14 RX ADMIN — Medication 250 MG: at 09:06

## 2017-08-14 RX ADMIN — HUMAN INSULIN 4 UNITS/HR: 100 INJECTION, SOLUTION SUBCUTANEOUS at 23:54

## 2017-08-14 RX ADMIN — ACETAMINOPHEN 650 MG: 325 SOLUTION ORAL at 16:05

## 2017-08-14 RX ADMIN — ACETAMINOPHEN 650 MG: 325 SOLUTION ORAL at 09:05

## 2017-08-14 RX ADMIN — LINEZOLID 600 MG: 600 INJECTION, SOLUTION INTRAVENOUS at 11:07

## 2017-08-14 RX ADMIN — OXYCODONE HYDROCHLORIDE 5 MG: 5 SOLUTION ORAL at 22:11

## 2017-08-14 RX ADMIN — MICONAZOLE NITRATE: 20 CREAM TOPICAL at 20:10

## 2017-08-14 RX ADMIN — Medication 1 PACKET: at 20:11

## 2017-08-14 RX ADMIN — Medication 250 MG: at 20:09

## 2017-08-14 RX ADMIN — FLUDROCORTISONE ACETATE 0.1 MG: 0.1 TABLET ORAL at 20:09

## 2017-08-14 RX ADMIN — FLUDROCORTISONE ACETATE 0.1 MG: 0.1 TABLET ORAL at 09:06

## 2017-08-14 RX ADMIN — Medication 5 ML: at 12:38

## 2017-08-14 RX ADMIN — PIPERACILLIN AND TAZOBACTAM 3.38 G: 3; .375 INJECTION, POWDER, LYOPHILIZED, FOR SOLUTION INTRAVENOUS; PARENTERAL at 12:40

## 2017-08-14 RX ADMIN — LOPERAMIDE HYDROCHLORIDE 4 MG: 2 SOLUTION ORAL at 12:38

## 2017-08-14 RX ADMIN — ALBUMIN (HUMAN) 12.5 G: 12.5 SOLUTION INTRAVENOUS at 06:43

## 2017-08-14 RX ADMIN — Medication 5 ML: at 20:08

## 2017-08-14 RX ADMIN — Medication 5 ML: at 14:21

## 2017-08-14 RX ADMIN — LOPERAMIDE HYDROCHLORIDE 4 MG: 2 SOLUTION ORAL at 20:08

## 2017-08-14 RX ADMIN — Medication 1 PACKET: at 12:46

## 2017-08-14 RX ADMIN — HUMAN INSULIN 2 UNITS/HR: 100 INJECTION, SOLUTION SUBCUTANEOUS at 04:29

## 2017-08-14 RX ADMIN — SODIUM CHLORIDE, POTASSIUM CHLORIDE, SODIUM LACTATE AND CALCIUM CHLORIDE 1000 ML: 600; 310; 30; 20 INJECTION, SOLUTION INTRAVENOUS at 11:10

## 2017-08-14 RX ADMIN — LINEZOLID 600 MG: 600 INJECTION, SOLUTION INTRAVENOUS at 22:57

## 2017-08-14 RX ADMIN — ASCORBIC ACID TAB 250 MG 250 MG: 250 TAB at 12:39

## 2017-08-14 RX ADMIN — SULFAMETHOXAZOLE AND TRIMETHOPRIM 80 MG: 200; 40 SUSPENSION ORAL at 09:06

## 2017-08-14 RX ADMIN — SODIUM BICARBONATE 650 MG TABLET 1300 MG: at 09:06

## 2017-08-14 RX ADMIN — PANTOPRAZOLE SODIUM 40 MG: 40 TABLET, DELAYED RELEASE ORAL at 09:06

## 2017-08-14 RX ADMIN — PIPERACILLIN AND TAZOBACTAM 3.38 G: 3; .375 INJECTION, POWDER, LYOPHILIZED, FOR SOLUTION INTRAVENOUS; PARENTERAL at 06:43

## 2017-08-14 RX ADMIN — MICONAZOLE NITRATE: 20 CREAM TOPICAL at 10:15

## 2017-08-14 RX ADMIN — Medication 1 PACKET: at 14:22

## 2017-08-14 RX ADMIN — GANCICLOVIR SODIUM 650 MG: 500 INJECTION, POWDER, LYOPHILIZED, FOR SOLUTION INTRAVENOUS at 17:21

## 2017-08-14 RX ADMIN — TACROLIMUS 120 MCG/HR: 5 INJECTION, SOLUTION INTRAVENOUS at 12:43

## 2017-08-14 RX ADMIN — Medication 1 PACKET: at 20:12

## 2017-08-14 ASSESSMENT — PAIN DESCRIPTION - DESCRIPTORS
DESCRIPTORS: ACHING
DESCRIPTORS: ACHING

## 2017-08-14 NOTE — PLAN OF CARE
Problem: Goal Outcome Summary  Goal: Goal Outcome Summary  Outcome: No Change  A/Ox2; disoriented to time and situation. VSS on 2L O2. Clear liquid diet; tolerating well. Up with team or mechanical lift. VPM continued. Tylenol and oxy x1 for pain. Denies nausea. BGs 158-189; insulin gtt on Algorithm 4 with Q1H checks. R PICC and R PIV for access. NJ with TF @ 65 mL/hr. R flank drain with 250 mL output. Crowe with 550 mL output. Ileostomy with 600 mL liquid output. Pt was turned and repositioned Q2H. Plan for pelvic CT tomorrow morning. Will continue with plan of care.

## 2017-08-14 NOTE — PROGRESS NOTES
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  Now s/p ex lap with R hemicolectomy, end ileostomy, mucus fistula, partial omenectomy on 7/26.    Increased hyperglycemia yesterday with TF ramped to goal and pt taking clears.    TF off for CT this morning and uncertain if will remain uninterrupted.    Plan:    -continue insulin drip.  Will reevaluate for subcut insulin tomorrow.  -meal aspart 1unit/7g CHO ordered for meals and snacks      Please notify diabetes team of changes planned for nutrition support schedule.     Outpatient diabetes follow up: Dr. Eckert at [facility]                   Interval History:   8/7/17 Pt upset about inpatient diabetes mgmt consult.  Transplant contacted pt's outpatient endocrinologist Dr. Eckert of [facility]-- no issue with inpatient team managing pt's glucose    The last 24 hours progress and nursing notes reviewed.   (previously required glargine 28 units and NPH 17 units for Nepro at 60ml/h).  Renal function worse now than it was.  Yet, insulin need is higher.  Lowest 2 units/h x 5 hours early this morning.  Yesterday, rates up to 5 and 7 units/h.  RN says PO intake is unpredictable.  Camacho had no complaints.   CT performed and TF resumed, but also expecting sinogram and possible drain exchange-- discussed with primary team.  Uncertain if TF would be impacted by that and do not want to rely on D10W for hypoglycemia prevention if TF are stopped.      Recent Labs  Lab 08/14/17  1412 08/14/17  1258 08/14/17  1159 08/14/17  1100 08/14/17  1009 08/14/17  0900  08/14/17  0621  08/13/17  0631  08/12/17  0630   08/11/17  0650  08/10/17  0704  08/09/17 2024   GLC  --   --   --   --   --   --   --  133*  --  113*  --  147*  --  112*  --  121*  --  176*   * 152* 150* 125* 128* 103*  < >  --   < >  --   < >  --   < >  --   < >  --   < >  --    < > = values in this interval not displayed.            Review of Systems:   See interval hx          Medications:   PTA taking Januvia and glargine versus glargine and aspart per carb    Active Diet Order      Advance Diet as Tolerated: Clear Liquid Diet     Physical Exam:  Gen:  Alert,NAD.   HEENT: NC/AT,  NJ feeding tube  Resp: unlabored at rest  Neuro: answers simple questions, pleasantly confused    /78 (BP Location: Left arm)  Pulse 98  Temp 97.7  F (36.5  C) (Oral)  Resp 20  Ht 1.829 m (6')  Wt 89.4 kg (197 lb)  SpO2 98%  BMI 26.72 kg/m2           Data:     Lab Results   Component Value Date    A1C  07/06/2017     Canceled, Test credited   Below Assay Range  NOTIFIED LEONARD ONEILL AT 0538 ON 7/6/17 BY EAANTOLIN      A1C 9.4 02/16/2017    A1C 6.2 12/14/2016    A1C 6.1 10/27/2016    A1C 8.3 07/02/2012            Recent Labs   Lab Test  08/14/17   0621  08/13/17   0631   NA  143  142   POTASSIUM  4.4  4.5   CHLORIDE  114*  116*   CO2  16*  17*   ANIONGAP  13  9   GLC  133*  113*   BUN  71*  66*   CR  1.92*  1.94*   JACOB  8.4*  8.4*     CBC RESULTS:   Recent Labs   Lab Test  08/14/17   0621   WBC  3.4*   RBC  2.77*   HGB  8.1*   HCT  25.2*   MCV  91   MCH  29.2   MCHC  32.1   RDW  17.8*   PLT  129*     Liver Function Studies -   Recent Labs   Lab Test  08/14/17   0621   PROTTOTAL  6.1*   ALBUMIN  1.8*   BILITOTAL  0.7   ALKPHOS  271*   AST  24   ALT  17     Janice Guzman APRN [facility] [phone]    Diabetes Management job code [id number]

## 2017-08-14 NOTE — PROVIDER NOTIFICATION
Triggered sepsis protocol. Lactic acid level 0.7. Provider on call notified via text page at this time.

## 2017-08-14 NOTE — PLAN OF CARE
Problem: Goal Outcome Summary  Goal: Goal Outcome Summary  SLP session cancelled: Pt NPO for procedures upon attempts this morning and afternoon. Will reschedule tx for tomorrow as appropriate.

## 2017-08-14 NOTE — PROGRESS NOTES
"[facility] Nurse Inpatient Adult Pressure INJURY (PI) Wound Assessment     Follow up assessment of PU(s) on pt's:  Ileostomy   Follow up assessment of PI on- left ear lobe and Sacrum      Data:   Patient History:      per MD note(s): This is a 51 year old male with complex PMH of CASTAÑEDA cirrhosis s/p Liver transplant Mar 2017.  Was admitted on 2014 with abdominal pain, sepsis, CT at OSH showed \"right colonic wall thickening suggesting colitis\" underwent Ex-Lap and washout for neutropenic colitis, intra-op was noted to have inflammed cecum and ascending colon with murky fluid.  Abdomen was left open at the time and was closed on 2017.  Some concern for CMV colitis with low count CMV viremia/GI PTLD and subsequently underwent colonoscopy on :  No colitis was seen on visualized portions of mucosa.     Current mattress:  Pulsate mattress   Current pressure relieving devices: Foam dressing, pt able to turn independently but rolls on his back often due to hip pain. Per RN he has been complaining of pain on various part of his body.    Moisture Management:  Crowe's catheter and ileostomy      Current Diet / Nutrition:       Active Diet Order      Advance Diet as Tolerated: Clear Liquid Diet      Kwasi Score  Av.5  Min: 10  Max: 22       Labs:     Recent Labs   Lab Test  17   0621   17   0711   17   0949   17   0316   ALBUMIN  1.8*   < >   --    < >   --    < >   --    HGB  8.1*   < >  7.7*   < >   --    < >  7.1*   INR   --    --    --    --   1.19*   < >  1.80*   WBC  3.4*   < >  8.8   < >   --    < >  0.8*   A1C   --    --    --    --    --    --   Canceled, Test credited   Below Assay Range  NOTIFIED LEONARD ONEILL AT 0538 ON 17 BY CHRISSY     CRP   --    --   190.0*   < >   --    < >   --     < > = values in this interval not displayed.                                                                                                                      Pressure " Injury Assessment  (location #1):   Sacrum  Wound History:   Pt was transferred from  to  on 7/25 now transferred back to  on 7/29. Had multiple procedures throughout the day on 7/25/17 per RN. Pt continues to be confused. He refuses to turn at times and rolls on his back right away due to c/o pain on his hips.        Pic taken- 7/26/17                                                  Pic taken-8/10    8/3/17- Unable to take picture today as pt wanted to roll on his back due to pain on his ribs  8/7/17- Unable to take picture today as pt not able to tolerate side lying due to pain on his hips  8/14/17- Phone not available to take picture today      Wound Base: 100% moist yellow fibrinous tissue with visible follicular buds    Specific Dimensions (length x width x depth, in cm) :   2.5 x 2 x0.2 cm (measured on 8/10/17)    Tunneling:  N/A    Undermining: N/A    Palpation of the wound bed:  Firm on bone    Slough appearance: None    Eschar appearance:  none    Periwound Skin: Healing moisture associated dermatitis with pinpoint satellite lesions    Color: pink    Temperature  normal     Drainage:  Moderate serous         Odor: none    Pain:  Painful    Pressure Injury Assessment  (location #2):   Left ear lobe (not assessed today)  Wound History:   Initially noted this injury during assessing the pt for sacral pressure injury. Now able to turn his head independently        Pic taken 7/26/17                                                                 Pic taken- 8/3/17    Wound Base: dry superficial thin scab, healing    Specific Dimensions (length x width x depth, in cm) :   1x0.5x0.0cm    Tunneling:  N/A    Undermining: N/A    Palpation of the wound bed:  none    Slough appearance:  none    Eschar appearance:  none    Periwound Skin: intact     Color: pink    Temperature  normal     Drainage:  none         Odor: none    Pain:  none             Intervention:     Patient's chart evaluated.      Kwasi  Interventions:  Current Kwasi Interventions and Care Plan reviewed and updated, appropriate at this time.    Wound was assessed.    Wound Care: was done: per POC mentioned below,    Orders  Written    Supplies  Reviewed    Discussed plan of care with Nurse           Assessment:     Pressure Injury (PI) located on Sacrum: DTPI    Left ear lobe- stage 2    Status: wound  Follow up assessment, Symptomatic, Evolving      Healing moisture associated dermatitis with fungal component on bilateral buttocks and inner thighs. Recommend to keep the catheter (condom catheter) to promote wound healing as pt continues to be confuse and his entire dressing has been wet previously.      New ileostomy and Mucus fistula- Initiated ostomy care teaching with his wife Danica today.         Plan:     Nursing to notify the Provider(s) and re-consult the WO Nurse if wound(s) deteriorate(s).  Plan of care for wound located on Sacrum :   Cleanse the area with NS and pat dry.  Apply No sting barrier to periwound skin.  Apply triad paste lightly only on open areas.  Cover wound with Sacral Mepilex (#[id number])  Change dressing Q 3 days or PRN.  Turn and reposition Q 2hrs.  Ensure pt has Adolph-cushion while sitting up in the chair.  Ensure pt is on pulsate mattress.   FYI- Only use Covidien pad no  pad.      BL buttocks and inner thigh BID-     Cleanse the area very gently with soft cloth.    Apply LEXI antifungal cream (comes from pharmacy)    With repeat application, do not scrub the paste, only remove soiled paste and reapply.    If complete removal of paste is necessary use baby oil/mineral oil and soft wash cloth.    Plan of care for wound located on left ear lobe: Monitor the area closely.     WOC Nurse will return: twice a week  [facility]  WO Nurse Inpatient Ostomy Assessment       Follow up assessment of ostomy and needs for:  Ileostomy and Mucus fistula       Data:   History:    This is a 51 year old male with complex  "PMH of CASTAÑEDA cirrhosis s/p Liver transplant Mar 2017.  Was admitted on 7/4/2014 with abdominal pain, sepsis, CT at OSH showed \"right colonic wall thickening suggesting colitis\" underwent Ex-Lap and washout for neutropenic colitis, intra-op was noted to have inflammed cecum and ascending colon with murky fluid.  Abdomen was left open at the time and was closed on 7/5/2017.  Some concern for CMV colitis with low count CMV viremia/GI PTLD and subsequently underwent colonoscopy on 7/21:  No colitis was seen on visualized portions of mucosa.  Exam sub-optimal as colon filled with solid stool.  Random biopsies obtained.  On 7/25/2017 pt became more tachycardic, increasing O2 needs and altered, pt was intubated, hypotension responsive to IVFs, worsening kidney function.  Has not required pressors.  CT was obtained which showed a new large heterogeneous right sided retroperitoneal gas and air tracking along duodenum.  No contrast extravasation.  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  Despite conservative management with bowel rest and IV abx, pt continued to spike fevers.  Now s/p Exploratory laparotomy, right angelique-colectomy, end ileostomy, mucus fistula, partial omentectomy on 7/26.    Type of ostomy:  Ileostomy and Mucus fistula  Stoma assessment:   ? stoma:  11/4\" , viable, pink, pale, round, moist, and protruberant  ? A bridge in place?: No  ? Stent(s) in place?: Not applicable,   Mucocutaneous Junction (MCJ):  Intact   Peristomal skin:  Intact   Abdominal assessment: soft   ? N/G still in place?:  Yes   Output:  small, watery, green    I/O last 3 completed shifts:  In: 4775 [P.O.:560; I.V.:910; Other:40; NG/GT:1030; IV Piggyback:1000]  Out: 3730 [Urine:1850; Drains:330; Stool:1550]     Current pouching system:  Kyara, 57mm flat two piece, cut to fit and flat and barrier ring with minimal melting around the cutting edges.   Pain:  Complaining of pain during cleansing  ? Is pt still on a PCA? " No  Diet:       Active Diet Order      Advance Diet as Tolerated: Clear Liquid Diet            Intervention:     Patient's chart evaluated.      Assessments done today:  Stoma, peristomal skin, and learning needs  ? Participants: pt and His wife Danica (ph no- [phone]). Pt continues to be confused and not able to participate.  ?  Educational intervention: method: Demonstrated pouch replacement using below mentioned supplies.  Prepared for discharge by: No discharge preparations started         Assessment:     Stoma assessment: viable and healthy    Complications: None    Learning needs/ comprehension: Wife is attentive and willing to learn.    Is pt able to demonstrate: 1. how to empty their pouch?  No: Demonstrated to wife today  2. How to read and write down correct I and O's?   No: Demonstrated to wife today    Effectiveness of current pouching/ supply plan: > 72 hrs         Plan:     Plan:   ? Current pouching system: Guttenberg 57 mm  2 pc flat kyara high output pouch with barrier ring    Education:  ? Educational needs: practice with How to empty pouch, Removal of pouch, Preparation of new pouch, Cutting out or evaluating a pattern, Applying paste or rings, Applying appliance to abdomen, Peristomal skin care, Diet and hydration / fluid balance, Odor / flatus management and Lifestyle Adjustments    ? Continue to prepare for discharge:  Supplies ordered, Completed supply list, Registered for samples from , Prescriptions or note left on chart for MD to sign/complete, Prepared for discharge to home with homecare, Discussed making a WOC Nurse outpatient appointment upon discharge, Discussed when to follow up with a WOC Nurse in the future and Discussed how/ where to order supplies   ? Do WOC Nurse recommend home care?  Possible TCU   Plan for ileostomy and Mucus fistula: RN to change pouch twice a week. WOC will initiate teaching once pt is stable.  Supplies Needed  Kyara (2pc 57mm)  Wafer-  (#[id number])  Pouch- (#[id number])  or  High output pouch - (#[id number]) depending upon output  Barrier ring- (#[id number])     Ileostomy care- Change pouch twice a week  1. Gather all supplies and remove old pouch gently.  2. Cleanse peristomal skin with w arm water and soft wash cloth or gauze and dry thoroughly.  3. Cut the wafer to fit to stoma or use given pattern, apply barrier ring around the cutting surface and apply centering the stoma.  4. Attach the pouch  5. Massage around it for better adherence.    Face to face time- 55 mins for ostomy teaching and sacral wound assessment  Follow up- Thursday at 2:30 pm for return demonstration of pouch change with pt's wife Danica.

## 2017-08-14 NOTE — PROGRESS NOTES
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 Concern for CMV colitis with low count CMV viremia/GI PTLD and subsequently underwent colonoscopy on 7/21, random biopsies obtained.  On 7/25/2017 patient had respiratory distress, abdominal compartment syndrome and EJ with oliguria. Broad spectrum antibiotics were started.  He was intubated and had a paracentesis with 5L removed. He did not required pressors.  CT was obtained which showed a new large heterogeneous right sided retroperitoneal gas and air tracking along duodenum.  No contrast extravasation.  No intraperitoneal air noted. Colorectal surgery was consulted. Initial conservative management with bowel rest and IV abx. Pt continued to spike fevers despite IV abx.  Now s/p Exploratory laparotomy, right angelique-colectomy, end ileostomy, mucus fistula, partial omentectomy on 7/26. CT scan     Graft Function: Good function. LFTs acceptable (checking every Monday, Thursday).   Immunosuppression Management:   CellCept discontinued (6/27/17) due to neutropenia.   Prograf goal ~5-6 due to sepsis.  Unable to obtain detectable level with suspension.  Level 8.2.  Decrease gtt to 110/hr  Cardiorespiratory: Stable respiratory status, NLB on RA. Assist with aggressive pulmonary toilet. OOB to chair and ambulate as tolerated. Hx HTN, PTA amlodipine discontinued.   Hematology: Pancytopenia on admission, acute on chronic, secondary to medications (MMF, bactrim, valcyte). Improved with holding medications and neupogen. Started IV Ganciclovir 7/20 for treatment of primary CMV infection (CMV  R-D+).  Continue to hold MMF and bactrim.   -Anemia- Hemoglobin stable. WBC stable. Last blood transfusion on 7/26.   GI:   -Neutropenic colitis on admission. Colonoscopy 7/21. Biopsy: resolving injury secondary to possible drug induced vs infectious.   -Bowel perforation: 7/25 CT scan abd/pelvis, po contrast, showed a new large heterogeneous right sided retroperitoneal gas and air tracking along duodenum.  No contrast extravasation.  No intraperitoneal air noted. Colorectal surgery consulted.  Continued to spike high grade temperature despite IV antibiotics therefore patient taken to OR. s/p Exploratory laparotomy, right angelique-colectomy, end ileostomy, mucus fistula, partial omentectomy on 7/26. Findings no neida perforation, phlegmon/inflammationright colon. Colon pathology suggested colon perforation, negative for malignancy; CMV stain positive.    -Diet:  CLD. TF per NJ. NPO after midnight. Continue TF will stop 4 hours prior to IR procedure.  -High output  ileostomy:  Goal ileostomy output ~ 1000 cc in 24 hrs.  Output 2.6 L yesterday.  Loperamide 2mg q6H continue. Lomotil BID 5 mg increase to q6. Restart fiber BID. Will consider adding tincture.   R/o peritonitis or bowel perforation:  CT A/P 8/9 showed a persistent gas/fluid collection RLQ, peritonitis, some free air but no evidence of contrast extravasation. 8/11: Diag. Para WBC 5,038. Repeat CT scan abd/pelvis today - decrease size of fluid collection with drain. New fluid collection seen with gas. Plan for sinogram and possible new drain placement tomorrow- IR consulted.   Fluid/Electrolytes/Renal:   -Dehydration: Give 1L LR today.   -EJ: on admission, likely sec to high intraabdominal pressures and ischemic ATN sec to sepsis. Improved, but now Cr increasing again.  Thought secondary to sepsis/ATN. Appears dehydrated will give fluids today. Tac dose decreased today.  Nephrology consulted.  -Hypernatremia: resolved with free water.    -Hyperkalemia: continue  florinef BID.   Avoid kayexalate due to recent bowel perforation.    Endocrine: DM type 2. Endocrine consulting for glycemic management.  Continue insulin gtt, will likely transition tomorrow after drain placement.  Infectious disease:  ID following.  Afebrile.  WBC 3.4.  -Fever:  Procalcitonin elevated.  CT C/A/P 8/9 showed a persistent gas/fluid collection RLQ, peritonitis, some free air but no evidence of contrast extravasation.  IR placed RP drain, growing clostridium- on zosyn, linezolid.  -CMV viremia: Primary CMV infection.  CMV colitis per pathology, peak serum 7/15 4225 IU/ml.  Continue IV ganciclovir, high maintenance dosing. Follow CMV PCR weekly, now <137 x 2 weeks  -EBV viremia:  Peak serum 406,697 copies/ml on 7/18.  Down to 6864 as of 8/1.  Check weekly.  -R/o SBP or abscess:  8/11 paracentesis.  WBC 5,038, cx negative.    Resolved issues:  -Severe sepsis: On admission, secondary to right colon phlegmon. Meropenem/daptomycin/micafungin tx x 10 days completed on 8/3. S/p right angelique-colectomy.  Path: CMV stain +, perforation of colon noted.  7/5 Fluid culture + staph capitis broth only (contamination).   Neuro: Toxic metabolic encephalopathy secondary to infection, prolonged hospital stay.  Delirious state is improved.  Vascular:  Evidence of microvascular injury in digits (toes) this is most likely due to injury while patient was on pressor therapy with sepsis.  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   Activity: Deconditioned due to prolong hospital course. Daily PT/OT, encourage pt to be OOB to chair. Encourage pulmonary toilet.   Prophylaxis: DVT-Heparin SQ  Disposition: 7A.     Medical Decision Making: Medium    PILLO/Fellow/Resident Provider:  Ada Driver PA-C    Faculty: Tip Zhang M.D.      __________________________________________________________________  Transplant History:.  3/4/2017 DD OLT with Dr. Tran (Liver), Postoperative day: 163      Interval History:   Overnight events: No acute events overnight. Tolerating TF and CLD. High output from ileostomy after restarting TF.     ROS:   A 10-point review of systems was negative except as noted above.    Meds:    diphenoxylate-atropine  5 mL Oral TID     fiber modular  1 packet Per Feeding Tube BID     sodium chloride (PF)  20 mL Irrigation Q6H     saccharomyces boulardii  250 mg Oral BID     sodium bicarbonate  1,300 mg Per NG tube TID     ascorbic acid  250 mg Oral Daily     sulfamethoxazole-trimethoprim  10 mL Oral Daily     ganciclovir (CYTOVENE) intermittent infusion  7.5 mg/kg Intravenous Q24H     miconazole with skin protectant   Topical BID     linezolid  600 mg Intravenous Q12H     piperacillin-tazobactam  3.375 g Intravenous Q6H     fludrocortisone  0.1 mg Oral BID     multivitamin CF formula  5 mL Per Feeding Tube Daily     zinc sulfate  220 mg Per Feeding Tube Daily     sodium chloride (PF)  3 mL Intracatheter Q8H     loperamide  4 mg Oral or Feeding Tube 4x Daily     insulin aspart   Subcutaneous TID w/meals     protein modular  1 packet Per Feeding Tube TID     aspirin  80 mg Oral Daily     pantoprazole  40 mg Oral or Feeding Tube Daily     sodium chloride (PF)  3 mL Intracatheter Q8H       Physical Exam:     Admit Weight: 94.2 kg (207 lb 11.2 oz) (SCALE 2)    Current vitals:   /78 (BP Location: Left arm)  Pulse 98  Temp 97.7  F (36.5  C) (Oral)  Resp 20  Ht 1.829 m (6')  Wt 89.4 kg (197 lb)  SpO2 98%  BMI 26.72 kg/m2    Vital sign ranges:    Temp:  [97.4  F (36.3  C)-98.3  F (36.8  C)] 97.7  F (36.5  C)  Pulse:  [87-98] 98  Heart Rate:  [] 89  Resp:  [20-24] 20  BP: (154-167)/(78-90) 155/78  SpO2:  [95 %-98 %] 98 %  Patient Vitals for the past 24 hrs:   BP Temp Temp src Pulse Heart Rate Resp SpO2 Weight   08/14/17 1300 155/78 97.7  F (36.5  C) Oral 98 - 20 98 % -   08/14/17 1000 - - - - - - 95 % 89.4 kg (197 lb)   08/14/17 0722 156/87 97.5  F (36.4  C) Oral 87 - 22 96  % -   08/14/17 0357 154/90 97.4  F (36.3  C) Oral 90 - 22 97 % -   08/14/17 0057 167/87 97.7  F (36.5  C) Axillary - 89 24 95 % -   08/13/17 2358 159/86 97.5  F (36.4  C) Oral 92 - 24 96 % -   08/13/17 1951 161/85 98.3  F (36.8  C) Oral - 100 24 97 % -   08/13/17 1748 - - - - - - - 85.8 kg (189 lb 1.6 oz)     General Appearance: NAD  Skin: normal, warm, dry  Heart: sinus tach 100-110  Lungs: productive cough, diminished bilateral  Abdomen: soft, rounded, more tender. Ileostomy with brown output. Mucus fistula covered. Midline incision, c/d/i.   : No vazquez.   Extremities: No edema.  Necrotic blackened areas on right 1st & 2nd toes and left 2nd toe.  Neurologic: A&O x 2, waxes and wanes.       Data:   CMP    Recent Labs  Lab 08/14/17  0621 08/13/17  0631  08/11/17  1310  08/10/17  0704    142  < >  --   < > 140   POTASSIUM 4.4 4.5  < >  --   < > 5.5*   CHLORIDE 114* 116*  < >  --   < > 112*   CO2 16* 17*  < >  --   < > 18*   * 113*  < >  --   < > 121*   BUN 71* 66*  < >  --   < > 61*   CR 1.92* 1.94*  < >  --   < > 1.70*   GFRESTIMATED 37* 36*  < >  --   < > 42*   GFRESTBLACK 45* 44*  < >  --   < > 51*   JACOB 8.4* 8.4*  < >  --   < > 8.5   MAG 2.1 2.1  < >  --   < > 1.9   PHOS 5.1* 5.6*  < >  --   < > 4.6*   ALBUMIN 1.8*  --   --   --   --  1.9*   BILITOTAL 0.7  --   --   --   --  2.2*   ALKPHOS 271*  --   --   --   --  136   AST 24  --   --   --   --  36   ALT 17  --   --   --   --  26   FAMY  --   --   --  19  --   --    < > = values in this interval not displayed.  CBC    Recent Labs  Lab 08/14/17  0621 08/13/17  0631   HGB 8.1* 8.4*   WBC 3.4* 3.3*   * 156     COAGS  No lab results found in last 7 days.    Invalid input(s): XA   Urinalysis  Recent Labs   Lab Test  08/10/17   1752  08/08/17   1600   06/14/17   1508   04/11/16   1345   COLOR  Yellow  Yellow   < >   --    < >  Yellow   APPEARANCE  Slightly Cloudy  Slightly Cloudy   < >   --    < >  Clear   URINEGLC  Negative  Negative   < >   --   "  < >  30*   URINEBILI  Small   This is an unconfirmed screening test result. A positive result may be false.  *  Negative   < >   --    < >  Negative   URINEKETONE  Negative  Negative   < >   --    < >  Negative   SG  1.012  1.010   < >   --    < >  1.016   UBLD  Negative  Negative   < >   --    < >  Small*   URINEPH  5.0  5.0   < >   --    < >  5.0   PROTEIN  30*  30*   < >   --    < >  30*   NITRITE  Negative  Negative   < >   --    < >  Negative   LEUKEST  Negative  Negative   < >   --    < >  Negative   RBCU  0  3*   < >   --    < >  1   WBCU  10*  7*   < >   --    < >  1   UTPG   --    --    --   1.55*   --   0.41*    < > = values in this interval not displayed.          7/21/2017   SPECIMEN(S):   A: Colon biopsy, ascending   B: Colon biopsy, descending     FINAL DIAGNOSIS:   A. COLON BIOPSY, ASCENDING:   - Colonic mucosa with no significant histologic abnormality   - Negative for intraepithelial lymphocytosis or deposition of   subepithelial thick collagenous band     B. COLON BIOPSY, DESCENDING:   - Crypt architecture distortion, consistent with healed prior injury   - Negative for active inflammation or dysplasia   - Negative for intraepithelial lymphocytosis or deposition of   subepithelial thick collagenous band   - Please, see comment     COMMENT:   Specimen B, \"descending colon\" features lamina propria edema, slight   variation in crypt sizes and shapes and occasional crypt drop-out.  This   may indicate a resolving injury which could be drug-induced or   infectious.       "

## 2017-08-14 NOTE — PROGRESS NOTES
Colorectal progress note  August 14, 2017    S: No acute issues overnight, tolerating CLD, afebrile, states pain is better    O:    Temp:  [97.4  F (36.3  C)-98.4  F (36.9  C)] 97.5  F (36.4  C)  Pulse:  [] 87  Heart Rate:  [] 89  Resp:  [18-24] 22  BP: (147-167)/(82-90) 156/87  SpO2:  [94 %-100 %] 96 %      Intake/Output Summary (Last 24 hours) at 08/14/17 0909  Last data filed at 08/14/17 0700   Gross per 24 hour   Intake             3420 ml   Output             4320 ml   Net             -900 ml     Ileostomy 2.6L/24 hrs    Gen: NAD, lying comfortably in bed  Chest: nonlabored breathing  CV: RRR  Abdomen: soft, nondistended, staples in place, clean incision, viable ileostomy and mucous fistula, more tender at the RUQ, no signs of generalized peritonitis  Extremities: without cyanosis or edema    Labs:  Lab Results   Component Value Date    WBC 3.4 08/14/2017     Lab Results   Component Value Date    RBC 2.77 08/14/2017     Lab Results   Component Value Date    HGB 8.1 08/14/2017     Lab Results   Component Value Date    HCT 25.2 08/14/2017     Lab Results   Component Value Date    MCV 91 08/14/2017     Lab Results   Component Value Date    MCH 29.2 08/14/2017     Lab Results   Component Value Date    MCHC 32.1 08/14/2017     Lab Results   Component Value Date    RDW 17.8 08/14/2017     Lab Results   Component Value Date     08/14/2017       Last Basic Metabolic Panel:  Lab Results   Component Value Date     08/14/2017      Lab Results   Component Value Date    POTASSIUM 4.4 08/14/2017     Lab Results   Component Value Date    CHLORIDE 114 08/14/2017     Lab Results   Component Value Date    JACOB 8.4 08/14/2017     Lab Results   Component Value Date    CO2 16 08/14/2017     Lab Results   Component Value Date    BUN 71 08/14/2017     Lab Results   Component Value Date    CR 1.92 08/14/2017     Lab Results   Component Value Date     08/14/2017         A/P: 52 yo male s/p liver  transplant, POD 19 exlap, right colectomy, end ileostomy and mucous fistula, s/p retroperitoneal drain placement, improving    - continue cares per primary team     Seen and discussed with Colorectal fellow    Karla Kraus, PGY-1  [phone]

## 2017-08-14 NOTE — PROGRESS NOTES
Nephrology Progress Note  08/14/2017      Camacho Bhagat is a 51 year old male with h/o ESLD s/p liver Tx 3/17, DM2, HTN, and RA who presented 7/4 with neutropenic colitis and is now s/p colectomy and more recently with c/f intra-abdominal infection s/p drain placement 8/9 with chronic hyperkalemia and EJ.      Assessment & Recommendations:     1. Non oliguric EJ - Stable. Creat 1.9. He appears dry to me.   Etiology infection, contrast, resumption of Prograf.   Baseline creat 1.4-1.7   - Continue to monitor for recovery with daily chemistry labs   - Avoid hypotension/Nephrotoxins   - Renal dose medications    2. Volume status - Appears hypovolemic. He is tachy, stalled creat and net fluid loss of 1.6 liters over last 24 hrs. No edema, hypoxia. Weight is likely inaccurate given that it is 4 kg above weight yesterday!. He received albumin 12.5 g today. /. No edema. UO 1700 cc, stool 2650 cc, drain 395 cc.    - Reassess in AM. May need a fluid bolus to keep him even.    - Continue daily standing weights   - Continue I/O    3. Electrolytes - No acute concerns. K 4.4, Na 143    4. Acid base - Metabolic acidosis with EJ/Diarrhea. Bicarb 16, down from 17 yesterday. Currently on Bicarb 1300 mg TID (just increased yesterday)   - Primary team working on controlling Diarrhea    5. BMD - corrected Ca 10.1, Phos 5.1. Not on Ca/D.    - check ionized Ca in AM ( ordered)    6. Antimicrobials - Ganciclovir, Zyvox, Zosyn, Bactrim. WBC 3.4. Afebrile. Abdominal drain placement 8/9/17    7. Liver transplant, IS - Prograf gtt. Level 8.2    8. Nutrition - On TF per nepro and ADAT, clears today. Albumin 1.8, on Protein pack TID.     Recommendations were communicated to primary team via progress note    Seen and discussed with Dr. Dena Cazares, NP   [phone]    Interval History :     Creat is stable  UO increasing  Large stool output    Review of Systems:     I reviewed the following systems:  GI: On TF w/Nepro.  Advancing diet, on Clear liquids   Neuro:  no confusion  Constitutional:  no fever or chills  CV: denies dyspnea, CP or edema.    : Has vazquez    Physical Exam:   I/O last 3 completed shifts:  In: 3600 [P.O.:580; I.V.:930; Other:30; NG/GT:520]  Out: 4320 [Urine:1700; Drains:370; Stool:2250]   /78 (BP Location: Left arm)  Pulse 98  Temp 97.7  F (36.5  C) (Oral)  Resp 20  Ht 1.829 m (6')  Wt 89.4 kg (197 lb)  SpO2 98%  BMI 26.72 kg/m2     GENERAL APPEARANCE: alert, interactive  EYES:  no scleral icterus, pupils equal  HENT: FT present  PULM: lungs CTA. Breathing is non labored. Not on supplemental oxygen  CV: tachy      -edema - none   GI: soft, NT. Staple line intact. Colostomy with large amt of liquid stool. Right sided drain with pink drainage  : Vazquez present with hardik urine  NEURO:  alert     Labs:   All labs reviewed by me  Electrolytes/Renal -   Recent Labs   Lab Test  08/14/17   0621 08/13/17   0631  08/12/17   0630   NA  143  142  143   POTASSIUM  4.4  4.5  4.4   CHLORIDE  114*  116*  115*   CO2  16*  17*  22   BUN  71*  66*  68*   CR  1.92*  1.94*  1.82*   GLC  133*  113*  147*   JACOB  8.4*  8.4*  8.6   MAG  2.1  2.1  2.1   PHOS  5.1*  5.6*  5.7*       CBC -   Recent Labs   Lab Test  08/14/17   0621 08/13/17   0631  08/12/17   0630   WBC  3.4*  3.3*  2.8*   HGB  8.1*  8.4*  7.9*   PLT  129*  156  132*       LFTs -   Recent Labs   Lab Test  08/14/17   0621  08/10/17   0704  08/08/17   0600   ALKPHOS  271*  136  172*   BILITOTAL  0.7  2.2*  0.5   ALT  17  26  25   AST  24  36  28   PROTTOTAL  6.1*  6.0*  6.4*   ALBUMIN  1.8*  1.9*  2.1*       Iron Panel -   Recent Labs   Lab Test  02/08/16   1144   IRON  93   IRONSAT  41         Imaging:  CT abd/pelvis results pending     Current Medications:    diphenoxylate-atropine  5 mL Oral TID     fiber modular  1 packet Per Feeding Tube BID     sodium chloride (PF)  20 mL Irrigation Q6H     saccharomyces boulardii  250 mg Oral BID     sodium  bicarbonate  1,300 mg Per NG tube TID     ascorbic acid  250 mg Oral Daily     sulfamethoxazole-trimethoprim  10 mL Oral Daily     ganciclovir (CYTOVENE) intermittent infusion  7.5 mg/kg Intravenous Q24H     miconazole with skin protectant   Topical BID     linezolid  600 mg Intravenous Q12H     piperacillin-tazobactam  3.375 g Intravenous Q6H     fludrocortisone  0.1 mg Oral BID     multivitamin CF formula  5 mL Per Feeding Tube Daily     zinc sulfate  220 mg Per Feeding Tube Daily     sodium chloride (PF)  3 mL Intracatheter Q8H     loperamide  4 mg Oral or Feeding Tube 4x Daily     insulin aspart   Subcutaneous TID w/meals     protein modular  1 packet Per Feeding Tube TID     aspirin  80 mg Oral Daily     pantoprazole  40 mg Oral or Feeding Tube Daily     sodium chloride (PF)  3 mL Intracatheter Q8H       insulin (regular) 3 Units/hr (08/14/17 1414)     NaCl 10 mL/hr at 08/14/17 0101     tacrolimus (Prograf) infusion ADULT 120 mcg/hr (08/14/17 1243)     - MEDICATION INSTRUCTIONS -       IV fluid REPLACEMENT ONLY Stopped (08/09/17 1808)     Cinda Cazares, NP

## 2017-08-14 NOTE — PROGRESS NOTES
[facility]              Name: Camacho Bhagat  Date: 8/14/17  To order your ostomy supplies    Call:      [facility]  Ph. [phone] [phone]  [facility]   Ph. [phone]  [facility]   Ph. [phone]  [facility]   Ph. [phone]  Or Call your insurance provider for their preferred supplier    Your Medical Supplier will need your insurance information and surgeon's name, phone and fax number    Clinic:                     Phone                            Fax  Colorectal Surgery:    [phone]  Verbal Order X 1 Month per:            Johnna Hansen RN CWOCN         Authorizing MD: Sara Dinh MD    Request the following supplies:      Kyara   1 piece flat fecal pouch with filter opaque with viewing option ( # [id number])  2 piece flat wafer 57mm  # [id number]   High output pouch 57mm- #  [id number]      Accessories  2  barrier ring #[id number]     Adapt powder #[id number]            Change your pouch twice a week, more often if leaking.    If you are cutting a hole in the wafer of your pouch, recheck stoma size and adjust pouch opening as needed every week    . Call the Ostomy Nurse at [facility]       [address] [place] [address] : [phone]   Schedule a follow-up visit in 4 to 6 weeks after your surgery, sooner if having problems Bring a complete set of pouch-changing supplies to this visit      Problems you should Report  - The stoma turns blue or darker in color.  - Cuts or sores around the stoma.  - Red, raw or painful skin around the stoma.  - Any bulging of the skin around the stoma.  - A pouch that leaks every day.  - Problems making the right size hole in the pouch wafer.    Please call with any questions or concerns.

## 2017-08-14 NOTE — PLAN OF CARE
Problem: Goal Outcome Summary  Goal: Goal Outcome Summary  VSS. O2 sats 95% on RA. Pt seems more alert today than yesterday, more calm and cooperative. Still confused, only oriented to self, speech frequently illogical. Abd CT done this morning. TF off for the morning, oral contrast given through [place]. TF restarted at 1300. Tolerating clear liquids, only had a small amount today d/t being NPO this morning. Continues with large output from ostomy, fiber started and lomotil increased. 1L LR bolus given over 4 hrs. WOC RN saw pt, changed sacral dressing. To be changed every other day. WOC also changed ostomy, wife present for teaching. Pt amb in bishop x1 with therapy. Somewhat compliant with turning q2h, frequently refuses and soon after turning typically shifts onto his back.

## 2017-08-14 NOTE — PLAN OF CARE
Problem: Goal Outcome Summary  Goal: Goal Outcome Summary  Outcome: No Change  Hypertensive per baseline OVSS on 2L of O2 per NC. Blood sugars 109-154, done hourly per insulin gtt protocol. Currently on 2units/hr using algorithm 4. Triggered sepsis protocol during the night. Lactic acid 0.7. C/o generalized pain, oxycodone given once which seemed to help. Denies nausea. Tolerating a clear liquid diet with Nepro tube feeds infusing at goal rate of 65mL/hr. PICC to RA infusing TKO, insulin gtt and tacrolimus gtt at 6mL/hr. Right flank drain put out 50mL of serous fluid. Ileostomy had 450mL of brown watery stool. Crowe patent draining adequate amounts of clear, hardik urine. Abdominal incision well approximated. Mucous fistula covered with dressing, no drainage noted. Confusion continues. VPM in place. Patient is able to voice needs.

## 2017-08-14 NOTE — PROGRESS NOTES
"[facility]  Transplant Infectious Diseases Progress Note      Camacho Bhagat MRN# [id number]   YOB: 1965 Age: 51 year old   Date of Service: 8/14/2017        Assessment and Recommendations:   Recommendations:  - Continue empiric Zosyn and linezolid (for history of VRE carriage) for the right retroperitoneal abscess and apparent secondary peritonitis; will discuss with team regarding trial of discontinuing Linezolid and treating with Zosyn alone given absence of evidence of active VRE infection   - Would continue the Zosyn indefinitely for now (although if he continues to improve, would favor eventually trying a switch to oral levofloxacin / metronidazole) on the presumption that he has an unclosed persistent enterostomy with 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  (Would appreciate some longer term speculation with Transplant Surgery as to his underlying problem and plan down the road, follow-up CT abd/pelvis done 8/14).   - The 8/9/17 Clostridium isolate is likely well-covered by Zosyn -- additional antibiotic therapy for that is not needed.  - Continue to monitor blood CMV PCR assays weekly (next due 8/17/17).  - Continue TMP-SMX prophylaxis.  - Repeat the EBV blood PCR viral load check on ~ 8/15/17  - Continue IV ganciclovir to \"high-maintenance\" dose at 7.5 mg / kg daily  - Leukopenia likely d/t Ganciclovir, recommend continuing Ganciclovir as above for now  - Add on differential to CBC --> If ANC <1.0, recommend G-CSF x1 (300mcg) and re-check ANC in am before re-dosing  - Re-check CRP, pro-calcitonin   - Will f/u CT Abd/pelvis 8/14     Transplant ID will continue to follow.    Patient seen and discussed with attending physician, Dr. Del Toro.     Reshma Concepcion MD  Internal Medicine, PGY-3  [phone]    Assessment:  A 51 year old gentleman immunosuppressed (tacrolimus; MMF on hold; received basiliximab initial induction) s/p liver transplant on 3/4/17 for CASTAÑEDA who was " admitted 7/4/17 with colitis and underwent 7/26/17 exploratory laparotomy after forming a RUQ phlegmon possibly due to colonic perforation.  The initial admission diagnosis was neutropenic colitis, but new primary seroconversion CMV viremia was discovered on 7/10/17.  He had persistent fevers from 7/10 - 15/17 and again from 7/21 - 8/4/17, was afebrile for three+ days, but has spiked renewed fevers (up to 100.7 degrees) since 8/7/17 late evening and is looking septic with increased malaise and abdominal pain, obtundation, increased delirium, rising inflammatory markers, and a higher peripheral WBC.  Repeat 8/9/17 abdominal CT scan showed right sided free air.  A RUQ retroperitoneal abscess drain was placed by IR on 8/9/17 and broad-spectrum antibiotic therapy resumed -- since then he has improved and continues to clinically improve as of 8/14.     ID issues:    - Renewed peritoneal sepsis / RUQ abdominal abscess:    Off antimicrobails (except TMP-SMX and Valcyte) for five days (8/4 - 8/17, after completing a ten day 7/25 - 8/3/17 empiric course of meropenem / daptomycin / micafungin) he again developed sepsis with a markedly increased procalcitonin, increased serum CRP, increased abdominal pain, and worsened confusion / obtundation.  8/9/17 chest / abdomen CT scan demonstrated little evidence of new infection above the diaphragm, but new infra-hepatic free air and an unchanged (versus the 8/4/17 abdominal CT scan) right retroperitoneal RUQ multifocal gas collection suggestive for an abscess.  Interventional Radiology placed a drain into that abscess 8/9/17 afternoon -- cultures have grown only a Clostridium species in the anaerobic broth culture.  Other potential sites for his sepsis beyond the abdomen were lacking and blood cultures remain negative.  On resumed empiric antibiotic therapy with Zosyn / linezolid since 8/9/17, his sepsis has resolved and he is again looking better with less obtundation.  All this has  "occured is in the wake of an exploratory laparotomy on 7/26/17 with a right angelique-colectomy / end ileostomy / mucous fistula / partial omentectomy -- intra-operatively no overt perforation was seen at that time, but right colitis was observed with a probable intra-adominal abscess or phlegmon (thought possibly due to a colonic perforation which was seen on the excised colonic histopathology).was found in BALs, and 8/5/17 ascites studies / cultures remain unremarkable.  While the sepsis seems presently controlled with drainage and broad-spectrum empiric antibiotics, his course, ongoing ascites fluid cell counts evidence of peritonitis (although ascites fluid cultures are suppressed on antibiotics), and the presence of Clostridium in the 8/9/17 anaerobic drainage culture implies that he very well may have an ongoing enterostomic leak into his peritoneum.  A long-term plan for dealing with that likelihood is eventually needed.  In the meanwhile, whether he still needs empiric linezolid therapy (or any VRE coverage) is open to question. CT abd/pelvis ordered for today (8/14), will follow up results and discuss with transplant surgery regarding long term plan for source control     - CMV viremia /  CMV colitis:  He presented with a \"detected\" low level CMV viremia (detected < 137 IU / ml) upon admission and developed a progressively worsening viremia after stopping his Valcyte prophylaxis.  Valcyte was re-started on 7/15/17 when the blood CMV PCR viral load was 4,225 IU / ml (3.6 log) and that was switched to ganciclovir on 7/20/17 with a viral load of 1,906 IU /ml (3.3 log). After one week of GCV treatment, the blood CMV PCR dropped to 290 IU / ml (2.5 log) on 7/27/17 and decreased further to < 137 IU / ml (< 2.1 log) on 8/3/17 (a > 1 log drop in the viremia over two weeks).  The CMV immunostain of colonic tissue biopsy was positive; confirming a CMV etiology of his colitis.  Because of an initial slow pace of viremia " "decline, he had been treated with supra-high dose ganciclovir (doubled to 10 mg / kg / dose BID) for the possibility of ganciclovir resistance, but with the improving viral load on 8/3/17, his ganciclovir dose was reduced to 7.5 mg / kg IV BID on 8/4/17 PM.  On that, the 8/10/17 repeat CMV viral load was again undetectable, so he was decreased to \"high maintenance\" ganciclovir dosing at 7.5 mg / kg / day on 8/11/17.  Weekly blood CMV PCR viral load checks (next due to be checked 8/17/17) will continue to make certain they remain suppressed at this maintenance dose.  (A CMV drug resistance genotype assay ordered on 7/27/17 went astray, so we have no genotypic data to help with decisions.)    - Hx Pancytopenia / Recurrent Leukopenia: Patient has known hx pancytopenia in the past, attributed to Valcyte and Bactrim. Has ongoing chronic anemia and thrombocytopenia this admission, though leukopenia had resolved while intermittently septic with normal WBC during most of admission. Over past 72 hours beginning 8/12 patient has had leukopenia; no differential completed during that time so unclear if neutropenic. Suspect this is due to IV Ganciclovir, especially given that patient has received supra-therapeutic doses this admission given c/f low level CMV resistance to ganciclovir. As patient is recovering from CMV viremia and remains at high risk for recurrence, would recommend treating with G-CSF as needed as opposed to holding or decreasing dose of Ganciclovir. Would add on differential to CBC and treat only for ANC <1.0,      - Improving encephalopathy:  His delirium and somnolence seem to vary with the state of his overall health and abdominal sepsis, so likely represent toxic-metabolic encephalopathy associated with sepsis and also somewhat with medications side effects superimposed on chronic hepatic encephalopathy.  A 7/20/17 brain MRI scan was unremarkable and a repeat brain MRI has not yet been deemed needed.  An " 8/5/17 serum cryptococcal antigen assay was negative.  The likelihood of any CNS infection at this time is exceeding low and additional CNS evaluation is presently not needed.    - Improved EBV viremia:  Initially discovered to have EBV viremia at 406,697  / ml on 7/18/17.  With decreased immunosuppression, that had fallen to 6,864  / ml by 8/1/17.  Checking blood EBV PCRs every couple weeks is reasonable for now.  Random colonic biopsies from the 7/21/17 colonoscopy and 7/26/17 hemicolectomy histopathology results were not suggestive for PTLD.    - S/p liver transplant:  Continue on tacrolimus; but MMF was held 6/27/17 in the context of sepsis and pancytopenia    Old ID issues:  - Resolved EJ:  Was due to severe sepsis.  Creatinine is now down to < 1.   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  Nevertheless, he received a full ten day (7/25 - 8/3/17) daptomycin course for severe sepsis in the setting of colonization with VRE.   -  Staphylococcus capitis in ascitic fluid 7/5/17:  Was culture contamination.   - Rhinovirus in BAL 7/15/2017:  Likely to be due to chronic shedding, since his main presentation was abdominal, not respiratory.    Other ID issues:  - PCP prophylaxis: TMP-SMX was held in 6/17 due to neutropenia, but resumed 8/4/17.  - Serostatus:  CMV D+/R-, EBV D+/R-, HSV1?/2?, VZV ?.  Presently on IV ganciclovir.  - Immunization status: up-to-date.  - Gamma globulin status: >1660 as of 7/24/2017  - Isolation: Contact isolation for a history of VRE carriage.    Interval History:  Mr. Bhagat was febrile to 100.7 degrees 8/7 - 8/9/17 with a rising procalcitonin / CRP and worsening obtundation, so repeat 8/9/17 chest / abdominal CT scan was performed showing persistent (versus the 8/4/17 abdominal CT scan) right retroperitoneal RUQ multifocal gas collection with  new intraperitoneal free air below the liver.  Interventional Radiology placed a right retroperitoneal drain into a presumed abscess on 8/9/17 and he has been on empiric Zosyn plus linezolid (for his history of VRE) since 8/9/17 (as well as ongoing IV ganciclovir since 7/20/17 and TMP-SMX prophylaxis since 8/4/17).  He has subsequently been afebrile with down-trending WBC (now leukopenic). All other clinical symptoms improving (decreasing abdominal pain, slowly improving obtundation, slightly more interactive though remains disoriented. Is able to hold conversation though easily confused.  Other than 8/9/17 drainage anaerobic culture  growing a Clostridium species in broth (at 48 hours incubation) all other recent culture data remains NGTD.  Review of systems remains difficult to obtain, but he lacks any obvious complaints today other than chronic mild abdominal pressure. On ongoing ganciclovir 7.5 mg PO qday since 8/11.      [facility] of Infectious Disease Illness (copied from the 8/4/17 Transplant ID Note):   A 51 year old gentleman with PMH of DM, HTN, fibromyalgia, previous DVT with IVC filter,  s/p liver transplant secondary to ESLD due to CASTAÑEDA on 03/04/2017, CMV D+/R-, EBV D+/R-, who was admitted on 07/04 due to neutropenic enterocolitis starting empiric therapy with zosyn + flagyl + vancomycin + micafungin being transferred to the ICU, requiring exploratory laparotomy + wash out for neutropenic enterocolitis on 07/04, reporting inflamed cecum and ascending colon, having a second look on 07/05 finding improvement of the inflammation and performing closure of abdominal incision; culture resulted S. Capitis considered a contaminant. Valcyte was stopped since admission. On 07/06, flagyl + vancomycin + micafungin were stopped and zosyn changed to ertapenem. As per ID note from 07/06, recommended to switch back ertapenem to zosyn since thrombocytopenia was seen before zosyn treatment, pancytopenia possibly related  to medication (MMF, bactrim, valcyte, initially seen at the beginning of June) and recommended to monitor CMV PCR weekly ( on 07/03: positive < 137; before on 06/26 was ND). Additionally, on 07/12 a BAL was repeated and showed VRE/CONS/P putida/C. Albicans, all were considered a colonizers, and primary team continued with ertapenem, held MMF, bactrim, valcyte). On 07/13, it was recommended to re-start valcyte since his CMV PCR was 145 and counts improved. Patient persisted febrile, with evident ascitis tapped but culture resulted negative. Course complicated with thrombosis of the right arterial artery with ischemia to the tip of the ischemic finger. On 07/15, BAL was repeated and was positive for rhinovirus. Valcyte was re-initiated on 07/15 due to CMV PCR: 4225. PAtient was discharged from the ICU on 07/17. Also, 14 days of ertapenem were completed on 07/18 and EBV PCR was taken on 07/18 and resulted [id number] concerning of PTLD since patient persisted febrile with ascitis and colitis. On 07/20, ZAKIA was switched to GCV due to worsening level of 1906. Patient became encephalopathic but MRI resulted negative. On 07/21 a colonoscopy was performed and showed normal colonic mucosa; random biopsies were taken. On 07/25, patient deteriorated clinically presenting respiratory failure requiring intubation, so was transferred to ICU and started empiric treatment with meropenem + daptomycin + micafungin, and GCV dose was increased to 10 mg BID. A CT abdomen was repeated and showed retroperitoneal air so underwent EL + lysis of adhesions + right hemicolectomy + ileostomy + partial omentectomy  due to ascending colitis (no neida perforation or ischemia). On 07/27 CMV PCR resulted 290. The initial admission diagnosis was neutropenic colitis, but new primary seroconversion CMV viremia was discovered on 7/10/17.  He had persistent fevers from 7/10 - 15/17 and again from 7/21 - 8/4/17, was afebrile for three+ days, but has spiked  renewed fevers (up to 100.7 degrees) since 8/7/17 late evening and is looking septic with increased malaise and abdominal pain, obtundation, increased delirium, rising inflammatory markers, and a higher peripheral WBC.  Repeat 8/9/17 abdominal CT scan showed right sided free air.  A RUQ retroperitoneal abscess drain was placed by IR on 8/9/17 and broad-spectrum antibiotic therapy resumed -- since then he has improved.    Transplants:  3/4/2017 (Liver)    Review of Systems:  CONSTITUTIONAL:  Afebrile past 72 hours, no fevers, chills, sweats  EYES: No eye pain, visual changes, or scleral icterus.  ENT:  No rhinorrhea, sinus pain, otalgia, hearing loss, tinnitus, sore throat, or oral pain.  Left ear lobe pressure ulcer.  LYMPH:  No edema.  RESPIRATORY:  No cough, increased sputum, or dyspnea (on intermittent low flow oxygen).  CARDIOVASCULAR:  No chest pain, palpitations.  GASTROINTESTINAL:  Improved abdominal pain, Significant ascites.  S/p liver transplant.  Dysphagia.  Liquid stool in his ileostomy.  No nausea, vomiting, or constipation.  GENITOURINARY:  Improved EJ.  Crowe in place draining clear yellow urine,  HEME:  Chronic anemia. Recurrent leukopenia, No easy bruising.  ENDOCRINE:  Type 2 diabetes mellitus on insulin.  MUSCULOSKELETAL:  Necrotic toe tips.  Generally deconditioned.  Coccygeal decub.  No new focal myalgias / arthralgias.  SKIN:  No rash or pruritus.  NEURO:  A/O x1 (to self), but able to hold vague conversation about clinical status,  No headache.  PSYCH:  Negative.    Past Medical / Surgical History:  Past Medical History:   Diagnosis Date     Cancer (H)      Depressive disorder, not elsewhere classified      Esophageal reflux      Fibromyalgia 1/2009    dx with Dr Benitez( Rheum)     History of thrombophlebitis      Osteoarthritis      Other acute embolism veins 11/01    Deep vein thrombophlebitis, filter placed     Other and unspecified hyperlipidemia      Other chronic nonalcoholic liver  disease     Fatty liver      Other testicular hypofunction      Pneumonia, organism 10-01    Included ARDS, sepsis, and  acute renal failure; hospitalized     Rheumatoid arthritis(714.0)      Type II or unspecified type diabetes mellitus without mention of complication, not stated as uncontrolled     Managed by endocrinology     Unspecified essential hypertension     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     Unspecified sleep apnea     Uses BiPAP     Past Surgical History:   Procedure Laterality Date     BENCH LIVER N/A 3/4/2017    Procedure: BENCH LIVER;  Surgeon: Jovan Tran MD;  Location: [facility] OR     C NONSPECIFIC PROCEDURE      tracheostomy     C NONSPECIFIC PROCEDURE      repair of deviated septum     C NONSPECIFIC PROCEDURE      Rt knee arthroscopy     C TOTAL KNEE ARTHROPLASTY      Right knee arthroscopy     CHOLECYSTECTOMY       COLONOSCOPY N/A 2017    Procedure: COMBINED COLONOSCOPY, SINGLE OR MULTIPLE BIOPSY/POLYPECTOMY BY BIOPSY;  Colonoscopy;  Surgeon: Izaiah Montes MD;  Location:  GI     ESOPHAGOSCOPY, GASTROSCOPY, DUODENOSCOPY (EGD), COMBINED N/A 2016    Procedure: COMBINED ESOPHAGOSCOPY, GASTROSCOPY, DUODENOSCOPY (EGD), BIOPSY SINGLE OR MULTIPLE;  Surgeon: Trent Pederson MD;  Location:  GI     LAPAROTOMY EXPLORATORY N/A 2017    Procedure: LAPAROTOMY EXPLORATORY;  Exploratory Laparotomy, washout;  Surgeon: Tip Zhang MD;  Location: [facility] OR     LAPAROTOMY EXPLORATORY N/A 2017    Procedure: LAPAROTOMY EXPLORATORY;  Exploratory Laparotomy, Washout with closure.;  Surgeon: Tip Zhang MD;  Location: [facility] OR     LAPAROTOMY EXPLORATORY N/A 2017    Procedure: LAPAROTOMY EXPLORATORY;  Exploratory Laparotomy, Right angelique-colectomy, end ileostomy, mucosal fistula, partial omentectomy;  Surgeon: Sara Dinh MD;  [facility]     TRANSPLANT LIVER RECIPIENT  DONOR N/A 3/4/2017    Procedure: TRANSPLANT LIVER  RECIPIENT  DONOR;  Surgeon: Jovan Tran MD;  Location: [facility] OR     Current Scheduled Medications:    fiber modular  1 packet Per Feeding Tube BID     sodium chloride (PF)  20 mL Irrigation Q6H     diphenoxylate-atropine  5 mL Oral 4x Daily     saccharomyces boulardii  250 mg Oral BID     sodium bicarbonate  1,300 mg Per NG tube TID     ascorbic acid  250 mg Oral Daily     sulfamethoxazole-trimethoprim  10 mL Oral Daily     ganciclovir (CYTOVENE) intermittent infusion  7.5 mg/kg Intravenous Q24H     miconazole with skin protectant   Topical BID     linezolid  600 mg Intravenous Q12H     piperacillin-tazobactam  3.375 g Intravenous Q6H     fludrocortisone  0.1 mg Oral BID     multivitamin CF formula  5 mL Per Feeding Tube Daily     zinc sulfate  220 mg Per Feeding Tube Daily     sodium chloride (PF)  3 mL Intracatheter Q8H     loperamide  4 mg Oral or Feeding Tube 4x Daily     insulin aspart   Subcutaneous TID w/meals     protein modular  1 packet Per Feeding Tube TID     aspirin  80 mg Oral Daily     pantoprazole  40 mg Oral or Feeding Tube Daily     sodium chloride (PF)  3 mL Intracatheter Q8H     Physical Examination:  Vital sign ranges over the past 24 hours:  Temp:  [97.4  F (36.3  C)-98.3  F (36.8  C)] 97.7  F (36.5  C)  Pulse:  [87-98] 98  Heart Rate:  [] 89  Resp:  [20-24] 20  BP: (154-167)/(78-90) 155/78  SpO2:  [95 %-98 %] 98 %    Intake/Output Summary (Last 24 hours) at 17 1705  Last data filed at 17 1700   Gross per 24 hour   Intake           2283.2 ml   Output             1705 ml   Net            578.2 ml     Physical Examination:  GENERAL:  Awake, alert, able to hold general conversation but oriented only to self, 51 year old man supine in bed in NAD.  EYES:  EOMI, anicteric sclerae.  ENT:  No otorrhea.  NJ tube present.  Nasal cannula present.  No anterior oral lesion.  NECK:  Supple.  LYMPH:  No cervical lymphadenopathy.  LUNGS:  Few bilaterally scattered coarse  rhonchi loudest in bases on anterior auscultation   CARDIOVASCULAR:  Regular rate and rhythm, tachycardia resolved, normal S1, S2, without murmur, gallop, or rub.  ABDOMEN:  Normal bowel sounds, soft, slight generalized tenderness, worst on right side, mildly distended, midline stapled wound lacks inflammation with dressed superior mucous fistula, RLQ ileostomy with liquid stool, right TABITHA drain with serous drainage.  :  Crowe with hardik urine.  EXTREMITIES:  Distally warm, no edema,  ischemic right hallux and right second toe, also ischemic tip of left second toe, and right index, no ulcers.  Right PICC line site lacks inflammation.  NEUROLOGIC:  Responding, oriented x 1.5, moves extremities x 4.    Inflammatory Markers    Recent Labs   Lab Test  08/09/17   0711  08/07/17   0549  08/06/17   0633  08/05/17   0616  07/05/17   1810  03/05/17   0358  11/23/16   0813  11/16/16   0706   08/15/11   1151  04/11/11   1209  01/03/11   1246  02/15/10   1232   SED   --    --    --    --    --    --   45*   --    --   11  14  17*  25*   CRP  190.0*  130.0*  130.0*  140.0*  354.0  20.0*  58.0*  59.1*   < >  11.1*  9.0*  11.7*  17.5*    < > = values in this interval not displayed.     Immune Globulin Studies     Recent Labs   Lab Test  07/24/17   0705  08/15/11   1243   IGG  1660*  1160   IGG1   --   734   IGG2   --   294   IGG3   --   7*   IGG4   --   59     Metabolic Studies       Recent Labs   Lab Test  08/14/17   0621  08/14/17   0020  08/13/17   0631  08/13/17   0027  08/12/17   0630  08/11/17   0650  08/10/17   2135  08/10/17   1323  08/10/17   0704  08/09/17   2024   08/01/17   0651   07/25/17   0945   07/06/17   0316   NA  143   --   142   --   143  140   --    --   140  138   < >  150*   < >  137   < >  143   POTASSIUM  4.4   --   4.5   --   4.4  4.9  5.1  5.4*  5.5*  5.7*   < >  4.3   < >  4.0   < >  5.0   CHLORIDE  114*   --   116*   --   115*  114*   --    --   112*  110*   < >  123*   < >  104   < >  116*   CO2  16*    --   17*   --   22  19*   --    --   18*  20   < >  20   < >  26   < >  18*   ANIONGAP  13   --   9   --   7  8   --    --   10  7   < >  7   < >  7   < >  9   BUN  71*   --   66*   --   68*  64*   --    --   61*  58*   < >  51*   < >  77*   < >  62*   CR  1.92*   --   1.94*   --   1.82*  1.82*   --    --   1.70*  1.47*   < >  0.90   < >  2.60*   < >  2.75*   GFRESTIMATED  37*   --   36*   --   39*  39*   --    --   42*  50*   < >  89   < >  26*   < >  24*   GLC  133*   --   113*   --   147*  112*   --    --   121*  176*   < >  141*   < >  382*   < >  139*   A1C   --    --    --    --    --    --    --    --    --    --    --    --    --    --    --   Canceled, Test credited   Below Assay Range  NOTIFIED LEONARD ONEILL AT 0538 ON 7/6/17 BY EAANTOLIN     JACOB  8.4*   --   8.4*   --   8.6  8.5   --    --   8.5  8.6   < >  8.1*   < >  7.6*   < >  6.9*   PHOS  5.1*   --   5.6*   --   5.7*  5.4*   --    --   4.6*   --    < >  3.1   < >   --    < >  5.5*   MAG  2.1   --   2.1   --   2.1  2.0   --    --   1.9   --    < >  1.9   < >   --    < >  2.1   LACT   --   0.7   --   1.0   --    --    --    --    --    --    < >   --    < >   --    < >  1.5   CKT   --    --    --    --    --    --    --    --    --    --    --   16*   --   40   --    --     < > = values in this interval not displayed.     Hepatic Studies    Recent Labs   Lab Test  08/14/17   0621  08/10/17   1323  08/10/17   0704  08/08/17   0600  08/02/17   0543  07/27/17   0410  07/25/17   1651   07/25/17   0017   07/11/17   0328   BILITOTAL  0.7   --   2.2*  0.5  0.4  1.3  0.6   < >   --    < >  0.5   ALKPHOS  271*   --   136  172*  199*  143  242*   < >   --    < >  158*   ALBUMIN  1.8*   --   1.9*  2.1*  2.3*  2.4*  2.0*   < >   --    < >  1.8*   AST  24   --   36  28  62*  27  61*   < >   --    < >  34   ALT  17   --   26  25  42  27  50   < >   --    < >  27   LDH   --   168   --    --    --    --    --    --   258*   --    --    GGT   --    --    --    --    --     --    --    --    --    --   58    < > = values in this interval not displayed.     Pancreatitis testing    Recent Labs   Lab Test  07/24/17   0705  07/17/17   0630  07/12/17   0342  07/10/17   0340  07/05/17   0346  03/05/17   0358  03/03/17   1458  02/21/17   1520  12/09/16   1159  02/08/16   1144   09/30/10   1734   AMYLASE   --    --    --    --    --   53  70   --    --    --    --   82   LIPASE   --    --    --    --    --   216   --   326  161   --    --   138   TRIG  303*  168*  114  177*  174*   --    --    --    --   99   < >   --     < > = values in this interval not displayed.     Hematology Studies      Recent Labs   Lab Test  08/14/17   0621  08/13/17   0631  08/12/17   0630  08/11/17   0650  08/10/17   2135  08/10/17   0704   08/09/17   0711   08/07/17   0549   08/06/17   0633   08/05/17   0616  08/04/17   0548  08/03/17   0533  08/02/17   0543   WBC  3.4*  3.3*  2.8*  4.3   --   6.1   --   8.8   < >  5.5   --   5.2   --   6.3  6.9  5.1  5.0   ANEU   --    --    --    --    --    --    --    --    --   3.7   --   3.5   --   3.9  4.1  2.8  2.7   ALYM   --    --    --    --    --    --    --    --    --   1.5   --   1.5   --   2.2  2.5  1.9  2.0   ZINA   --    --    --    --    --    --    --    --    --   0.2   --   0.2   --   0.2  0.3  0.3  0.2   AEOS   --    --    --    --    --    --    --    --    --   0.1   --   0.0   --   0.0  0.0  0.0  0.1   HGB  8.1*  8.4*  7.9*  7.9*  6.3*  7.1*   < >  7.7*   < >  7.7*   < >  7.6*   < >  6.9*  7.8*  7.3*  7.6*   HCT  25.2*  26.4*  24.5*  24.3*   --   21.3*   --   23.3*   < >  23.7*   --   23.2*   --   20.9*  24.3*  23.5*  24.4*   PLT  129*  156  132*  140*   --   129*   --   122*   < >  116*   --   94*   --   94*  99*  92*  90*    < > = values in this interval not displayed.     Arterial Blood Gas Testing    Recent Labs   Lab Test  08/10/17   1515  08/02/17   1216  07/26/17   2243  07/26/17   2133   07/26/17 2017 07/25/17   1746   PH  7.33*  7.37   Incorrect specimen type  NOTTIED BY WOJCIECH DEWITT, NEW ORDER TO REPLACE.7/26/17 AT 2346 BY ZAINAB.  CORRECTED ON 07/26 AT 2350: PREVIOUSLY REPORTED AS 7.27     --    --   Canceled, Test credited   Incorrect specimen type    7.32*   PCO2  34*  31*  Incorrect specimen type  NOTTIED BY WOJCIECH DEWITT, NEW ORDER TO REPLACE.7/26/17 AT 2346 BY ZAINAB.  CORRECTED ON 07/26 AT 2350: PREVIOUSLY REPORTED AS 45     --    --   Canceled, Test credited   Incorrect specimen type    42   PO2  68*  69*  Incorrect specimen type  NOTTIED BY WOJCIECH DEWITT, NEW ORDER TO REPLACE.7/26/17 AT 2346 BY ZAINAB.  CORRECTED ON 07/26 AT 2350: PREVIOUSLY REPORTED AS 53     --    --   Canceled, Test credited   Incorrect specimen type    81   HCO3  18*  18*  Incorrect specimen type  NOTTIED BY WOJCIECH DEWITT, NEW ORDER TO REPLACE.7/26/17 AT 2346 BY JS.  CORRECTED ON 07/26 AT 2350: PREVIOUSLY REPORTED AS 20     --    --   Canceled, Test credited   Incorrect specimen type    22   O2PER  2L  21  Incorrect specimen type  NOTTIED BY WOJCIECH DEWITT, NEW ORDER TO REPLACE.7/26/17 AT 2346 BY ZAINAB.  CORRECTED ON 07/26 AT 2350: PREVIOUSLY REPORTED AS 40    40  62   < >  100.0  100  40.0    < > = values in this interval not displayed.     Urine Studies     Recent Labs   Lab Test  08/10/17   1752  08/08/17   1600  08/05/17   0617  07/28/17   1042  07/25/17   1205   URINEPH  5.0  5.0  5.0  5.0  5.0   NITRITE  Negative  Negative  Negative  Negative  Negative   LEUKEST  Negative  Negative  Negative  Negative  Trace*   WBCU  10*  7*  5*  6*  5*     Vancomycin Levels     Recent Labs   Lab Test  07/06/17   0316  10/28/16   1405   VANCOMYCIN  22.1  14.4     Procalcitonins:  8/12 9.21  8/9 7.55  8/8 3.11  8/5 1.06  8/2 0.78    Microbiology:  8/11 Ascites cx:  NGTD.  Gram stain:  NOS, many WBCs (predominately PMNs).  Anaerobic / fungal cxs NGTD.  Fluid analysis:  Yellow, cloudy, WBC 5,038 (83% N, 1% L, 16% M), protein 4.0, , glucose 5, amylase 19, bilirubin 1.2.  8/11 Ur cx  NGTD  8/9 Right retroperitoneal abscess drainage aerobic cx:  NGTD.  Gram stain:  NOS, many WBCs (predominately PMNs).  Anaerobic cx:  Clostridium sps isolated in broth at 48 hours of incubation.  8/9 x 2, 8/4 x 2, 8/2 x 2, 7/31 x 2, 7/28 x 2, 7/25 x 2, 7/15 Bld cxs NGTD / negative  8/5 ascites aerobic / anaerobic / fungal cxs NGTD.  Gram stain:  NOS, few WBCs (predominately PMNs), moderate RBCs.  Fluid analysis:  Yellow, slightly cloudy,  (91%N, 6% L, 3% M), albumin 1.6, protein 3.8.  8/5, 7/25 Ur cxs:  Negative  8/5 Serum CRAG negative  7/29 Sp cx:  Normal elvie, heavy C albicans  7/26 Ascites (OR) cx:  Negative.   Gram stain:  NOS, few WBCs.  7/25 Ascites cx: Negative.  Gram stain:  NOS, no WBCs  7/15 BAL fluid studies:  Yeast and Rhinovirus  7/12 BAL fluid studies:  Single colony of VRE, C albicans/dubliniensis   7/11 Sp cx:  VRE and Coag Neg Staph   7/5 Ascites cx:  S capitis    CMV viral loads    Recent Labs   Lab Test  08/10/17   0704  08/03/17   0533  07/27/17   1109  07/20/17   1427  07/18/17   0530   CSPEC  EDTA PLASMA  CORRECTED ON 08/11 AT 0714: PREVIOUSLY REPORTED AS Whole Blood    EDTA PLASMA  EDTA PLASMA  CORRECTED ON 07/28 AT 0840: PREVIOUSLY REPORTED AS Blood    Plasma  Plasma   CMVLOG  <2.1  <2.1  2.5*  3.3*  3.0*     CMV viral loads    Log IU/mL of CMVQNT   Date Value Ref Range Status   08/10/2017 <2.1 <2.1 [Log_IU]/mL Final   08/03/2017 <2.1 <2.1 [Log_IU]/mL Final   07/27/2017 2.5 (H) <2.1 [Log_IU]/mL Final   07/20/2017 3.3 (H) <2.1 [Log_IU]/mL Final   07/18/2017 3.0 (H) <2.1 [Log_IU]/mL Final   07/15/2017 3.6 (H) <2.1 [Log_IU]/mL Final   07/10/2017 2.2 (H) <2.1 [Log_IU]/mL Final   07/03/2017 <2.1 <2.1 [Log_IU]/mL Final   06/26/2017 Not Calculated <2.1 [Log_IU]/mL Final     CMV resistance testing    EBV DNA Copies/mL   Date Value Ref Range Status   08/01/2017 6864 (A) EBVNEG [Copies]/mL Final   07/18/2017 071921 (A) EBVNEG [Copies]/mL Final     Pathology:   Colectomy path 7/26/17  -  Colonic wall with perforation, edema, and acute serositis   - Background colonic mucosa with no significant histologic abnormality   - CMV immunostain is positive in rare (3) cells   - Terminal ileum with no significant histologic abnormality   - Viable, unremarkable surgical margins   - One benign lymph node (0/1)   - Appendix with fibrous obliteration of the tip     Colon biopsies 2017   A: Colon biopsy, ascending   B: Colon biopsy, descending   FINAL DIAGNOSIS:   A. COLON BIOPSY, ASCENDING:   - Colonic mucosa with no significant histologic abnormality   - Negative for intraepithelial lymphocytosis or deposition of   subepithelial thick collagenous band   B. COLON BIOPSY, DESCENDING:   - Crypt architecture distortion, consistent with healed prior injury   - Negative for active inflammation or dysplasia   - Negative for intraepithelial lymphocytosis or deposition of   subepithelial thick collagenous band     Cytology of ascitic fluid 2017   PERITONEAL FLUID, ASCITES:   -Negative for malignancy   Cytology of ascitic fluid 2017.   Peritoneal fluid, ascites:   - Negative for malignancy   - Acute and chronic inflammation present   Ascites fluid:  Rare to absent viable B cells.  (This specimen was collected on 17 but was not ordered/delivered to   lab/run until 17 - results should be interpreted with caution due   to low cellularity/viability.   Due to limited cellularity we were only able to analyze the B cells.   There is no immunophenotypic evidence of B cell non-Hodgkin lymphoma.   Neoplastic cells, including large cells, may not survive specimen   processing. This sample may not be representative. Final interpretation   requires correlation with morphologic and clinical features.)    Imagin/14 CT abd/pelvis pending    Paracentesis performed by IR.  8/10 CXRs:  Right PICC.  Increasing pulmonary edema.  Left retrocardiac and basilar opacities, atelectasis and/or infection.  Small  right pleural effusion.  8/10 Abdm U/S:  Small to moderate volume complex ascites visualized, greatest in the lower quadrants, left greater than right. Right retroperitoneal air again noted.  Right-sided pleural effusion.  8/9 CT-guided retroperitoneal abscess drainage performed by IR.  8/9 Chest / abdm CT:  Unchanged right retroperitoneal air with new foci of free intraperitoneal air in the right upper quadrant. No extravasation of oral contrast identified.  No significant change in large volume abdominal ascites, thickened peritoneum and diffuse bowel wall thickening concerning for peritonitis.  Small right and trace left pleural effusions with overlying atelectasis and patchy consolidations. Overall, the consolidations are slightly increased since the prior exam. Differential includes atelectasis or infection.  Secondary signs of portal hypertension similar to the prior exam.  Unchanged mild bilateral renal pelvocaliectasis.  8/8 CXR:  Increased bibasilar streaky opacities, compatible with atelectasis and/or infection.  Probable small bilateral pleural effusions.  Persistent low lung volumes.  8/5 Ultrasound-guided paracentesis performed by IR:  250 ccyellow-brown fluid sent for analysis.  8/4 Abdm CT:  Heterogenous area of right-sided retroperitoneal gas has mildly decreased in size (versus 7/25/17 scan).  Moderate to severe ascites with diffuse peritoneal enhancement.  This may represent diffuse infection, for example bacterial peritonitis.  Mild residual foci of free air in the abdomen is likely postsurgical.  Diffuse small bowel and colonic wall thickening, similar to prior, likely reactive to ascites/peritonitis.  Findings of portal hypertension including splenomegaly, ascites, esophageal and upper abdominal varices.  Small right pleural effusion with associated atelectasis. Improved left pleural effusion and basilar consolidation.  8/4 CXR:  Decreased lung volumes with improving perihilar and bibasilar  opacities, which may represent atelectasis, pulmonary edema, or infection.  8/2 CXR: Decreased lung volumes with mildly increased perihilar and bibasilar opacities, which may represent pulmonary edema, atelectasis, or infection.  Small right pleural effusion.  8/2 BLE U/S:  Right leg: Normal RONAL. Mildly Abnormal TBI, suggestive of small vessel disease. Patent right lower extremity arteries without evidence of hemodynamically significant stenosis.  Left leg: Normal RONAL. Abnormal TBI, suggestive of small vessel disease. Patent left lower extremity arteries without evidence of hemodynamically significant stenosis.  7/31 CXR:  Improving perihilar and bibasilar opacities likely represent infection/aspiration, atelectasis, or pulmonary edema.  Moderate opacities bilaterally persist.  Stable small to moderate pleural effusions bilaterally.  7/28 CXR:  Interval removal of the endotracheal tube.  Increased perihilar opacities, worsening infection versus pulmonary edema.  Increased bilateral moderate pleural effusions with overlying atelectasis/consolidation.  7/25 Chest / abdm CT:  New large heterogeneous area of right-sided retroperitoneal gas which is highly concerning for an infectious process.  Given the history of prior neutropenic colitis and biopsies, there could also be a component of stool from a ruptured viscus, despite the lack of  surrounding bowel loops and no extraluminal oral contrast. Surgical consultation is recommended. Bibasilar atelectasis versus consolidation with small bilateral pleural effusions.  Extensive mesenteric and soft tissue edema with large volume ascites. Numerous mesenteric and retroperitoneal lymph nodes which are presumably reactive.  Postsurgical changes of liver transplant.  7/20 Brain MRI:  Significant image degradation due to motion artifact, with no definite acute intracranial pathology. No abnormal contrast enhancing intracranial lesions.  Mucosal thickening in the sphenoid and  maxillary sinuses and left greater than right mastoid fluid are nonspecific in the setting of recent intubation.  7/13 Chest / abdm CT:  Improved moderate right-sided pleural effusion and resolution of left-sided pleural effusion. There remain large areas of atelectasis/consolidation in both lungs, including in the left lower lobe despite resolution of left-sided pleural effusion. Superimposed infectious process cannot be excluded.  Moderate-large amount of ascites increased from 7/8/2017, which makes it difficult to evaluate for the presence or absence of inflammatory change or infectious process in the abdomen or pelvis. No intra-abdominal abscess.  Stable small presumed hematoma within the ventral abdominal wall fat.  Splenomegaly.

## 2017-08-14 NOTE — PLAN OF CARE
Problem: Goal Outcome Summary  Goal: Goal Outcome Summary  PT 7A: Pt requires Mod A of 2 for rolling and reaching EOB sitting. CGA - Min A x 1 for sit <> stand up to FWW. Ambulates 2 x ~15' with FWW and CGA + w/c follow and assist for lines. Pt tolerates 18 minutes standing activity before returning to bed. Requires Min A of 2 for sit > supine transfer. O2 sats 95% or greater on RA during session.     Recommend TCU once medically appropriate to improve functional mobility, strength and activity tolerance.

## 2017-08-15 ENCOUNTER — APPOINTMENT (OUTPATIENT)
Dept: CT IMAGING | Facility: CLINIC | Age: 52
End: 2017-08-15
Attending: TRANSPLANT SURGERY
Payer: COMMERCIAL

## 2017-08-15 ENCOUNTER — APPOINTMENT (OUTPATIENT)
Dept: INTERVENTIONAL RADIOLOGY/VASCULAR | Facility: CLINIC | Age: 52
End: 2017-08-15
Attending: RADIOLOGY PRACTITIONER ASSISTANT
Payer: COMMERCIAL

## 2017-08-15 ENCOUNTER — APPOINTMENT (OUTPATIENT)
Dept: PHYSICAL THERAPY | Facility: CLINIC | Age: 52
End: 2017-08-15
Attending: TRANSPLANT SURGERY
Payer: COMMERCIAL

## 2017-08-15 LAB
ANION GAP SERPL CALCULATED.3IONS-SCNC: 8 MMOL/L (ref 3–14)
APPEARANCE FLD: NORMAL
BACTERIA SPEC CULT: NO GROWTH
BACTERIA SPEC CULT: NO GROWTH
BASOPHILS # BLD AUTO: 0 10E9/L (ref 0–0.2)
BASOPHILS NFR BLD AUTO: 0 %
BUN SERPL-MCNC: 75 MG/DL (ref 7–30)
CA-I SERPL ISE-MCNC: 4.6 MG/DL (ref 4.4–5.2)
CALCIUM SERPL-MCNC: 8.1 MG/DL (ref 8.5–10.1)
CHLORIDE SERPL-SCNC: 115 MMOL/L (ref 94–109)
CO2 SERPL-SCNC: 18 MMOL/L (ref 20–32)
COLOR FLD: YELLOW
CREAT SERPL-MCNC: 1.87 MG/DL (ref 0.66–1.25)
DIFFERENTIAL METHOD BLD: ABNORMAL
EOSINOPHIL # BLD AUTO: 0 10E9/L (ref 0–0.7)
EOSINOPHIL NFR BLD AUTO: 0 %
ERYTHROCYTE [DISTWIDTH] IN BLOOD BY AUTOMATED COUNT: 17.4 % (ref 10–15)
GFR SERPL CREATININE-BSD FRML MDRD: 38 ML/MIN/1.7M2
GLUCOSE BLDC GLUCOMTR-MCNC: 110 MG/DL (ref 70–99)
GLUCOSE BLDC GLUCOMTR-MCNC: 116 MG/DL (ref 70–99)
GLUCOSE BLDC GLUCOMTR-MCNC: 118 MG/DL (ref 70–99)
GLUCOSE BLDC GLUCOMTR-MCNC: 123 MG/DL (ref 70–99)
GLUCOSE BLDC GLUCOMTR-MCNC: 125 MG/DL (ref 70–99)
GLUCOSE BLDC GLUCOMTR-MCNC: 127 MG/DL (ref 70–99)
GLUCOSE BLDC GLUCOMTR-MCNC: 128 MG/DL (ref 70–99)
GLUCOSE BLDC GLUCOMTR-MCNC: 131 MG/DL (ref 70–99)
GLUCOSE BLDC GLUCOMTR-MCNC: 132 MG/DL (ref 70–99)
GLUCOSE BLDC GLUCOMTR-MCNC: 132 MG/DL (ref 70–99)
GLUCOSE BLDC GLUCOMTR-MCNC: 138 MG/DL (ref 70–99)
GLUCOSE BLDC GLUCOMTR-MCNC: 141 MG/DL (ref 70–99)
GLUCOSE BLDC GLUCOMTR-MCNC: 143 MG/DL (ref 70–99)
GLUCOSE BLDC GLUCOMTR-MCNC: 147 MG/DL (ref 70–99)
GLUCOSE BLDC GLUCOMTR-MCNC: 147 MG/DL (ref 70–99)
GLUCOSE BLDC GLUCOMTR-MCNC: 152 MG/DL (ref 70–99)
GLUCOSE BLDC GLUCOMTR-MCNC: 157 MG/DL (ref 70–99)
GLUCOSE BLDC GLUCOMTR-MCNC: 164 MG/DL (ref 70–99)
GLUCOSE BLDC GLUCOMTR-MCNC: 165 MG/DL (ref 70–99)
GLUCOSE BLDC GLUCOMTR-MCNC: 180 MG/DL (ref 70–99)
GLUCOSE FLD-MCNC: 15 MG/DL
GLUCOSE SERPL-MCNC: 112 MG/DL (ref 70–99)
GRAM STN SPEC: NORMAL
GRAM STN SPEC: NORMAL
HCT VFR BLD AUTO: 23.3 % (ref 40–53)
HGB BLD-MCNC: 7.4 G/DL (ref 13.3–17.7)
IMM GRANULOCYTES # BLD: 0.1 10E9/L (ref 0–0.4)
IMM GRANULOCYTES NFR BLD: 4.1 %
LDH FLD L TO P-CCNC: 2669 U/L
LYMPHOCYTES # BLD AUTO: 0.7 10E9/L (ref 0.8–5.3)
LYMPHOCYTES NFR BLD AUTO: 25.8 %
LYMPHOCYTES NFR FLD MANUAL: 3 %
Lab: NORMAL
MAGNESIUM SERPL-MCNC: 1.9 MG/DL (ref 1.6–2.3)
MCH RBC QN AUTO: 28.6 PG (ref 26.5–33)
MCHC RBC AUTO-ENTMCNC: 31.8 G/DL (ref 31.5–36.5)
MCV RBC AUTO: 90 FL (ref 78–100)
MICRO REPORT STATUS: NORMAL
MICRO REPORT STATUS: NORMAL
MONOCYTES # BLD AUTO: 0.3 10E9/L (ref 0–1.3)
MONOCYTES NFR BLD AUTO: 10.5 %
MONOS+MACROS NFR FLD MANUAL: 5 %
NEUTROPHILS # BLD AUTO: 1.6 10E9/L (ref 1.6–8.3)
NEUTROPHILS NFR BLD AUTO: 59.6 %
NEUTS BAND NFR FLD MANUAL: 92 %
PHOSPHATE SERPL-MCNC: 4 MG/DL (ref 2.5–4.5)
PLATELET # BLD AUTO: 118 10E9/L (ref 150–450)
POTASSIUM SERPL-SCNC: 4.3 MMOL/L (ref 3.4–5.3)
RBC # BLD AUTO: 2.59 10E12/L (ref 4.4–5.9)
RBC # FLD: NORMAL /UL
SODIUM SERPL-SCNC: 140 MMOL/L (ref 133–144)
SPECIMEN SOURCE FLD: NORMAL
SPECIMEN SOURCE: NORMAL
TACROLIMUS BLD-MCNC: 6.6 UG/L (ref 5–15)
TME LAST DOSE: NORMAL H
WBC # BLD AUTO: 2.7 10E9/L (ref 4–11)
WBC # FLD AUTO: NORMAL /UL

## 2017-08-15 PROCEDURE — 25000132 ZZH RX MED GY IP 250 OP 250 PS 637: Performed by: STUDENT IN AN ORGANIZED HEALTH CARE EDUCATION/TRAINING PROGRAM

## 2017-08-15 PROCEDURE — 89051 BODY FLUID CELL COUNT: CPT | Performed by: PHYSICIAN ASSISTANT

## 2017-08-15 PROCEDURE — 82330 ASSAY OF CALCIUM: CPT | Performed by: STUDENT IN AN ORGANIZED HEALTH CARE EDUCATION/TRAINING PROGRAM

## 2017-08-15 PROCEDURE — 00000146 ZZHCL STATISTIC GLUCOSE BY METER IP

## 2017-08-15 PROCEDURE — 87075 CULTR BACTERIA EXCEPT BLOOD: CPT | Performed by: PHYSICIAN ASSISTANT

## 2017-08-15 PROCEDURE — 25800025 ZZH RX 258: Performed by: CLINICAL NURSE SPECIALIST

## 2017-08-15 PROCEDURE — 82945 GLUCOSE OTHER FLUID: CPT | Performed by: PHYSICIAN ASSISTANT

## 2017-08-15 PROCEDURE — C1769 GUIDE WIRE: HCPCS

## 2017-08-15 PROCEDURE — 25000125 ZZHC RX 250: Performed by: RADIOLOGY

## 2017-08-15 PROCEDURE — 85004 AUTOMATED DIFF WBC COUNT: CPT | Performed by: STUDENT IN AN ORGANIZED HEALTH CARE EDUCATION/TRAINING PROGRAM

## 2017-08-15 PROCEDURE — 87070 CULTURE OTHR SPECIMN AEROBIC: CPT | Performed by: PHYSICIAN ASSISTANT

## 2017-08-15 PROCEDURE — 25000132 ZZH RX MED GY IP 250 OP 250 PS 637: Performed by: TRANSPLANT SURGERY

## 2017-08-15 PROCEDURE — 25000128 H RX IP 250 OP 636: Performed by: NURSE PRACTITIONER

## 2017-08-15 PROCEDURE — 12000024 ZZH R&B TRANSPLANT CRITICAL

## 2017-08-15 PROCEDURE — 83615 LACTATE (LD) (LDH) ENZYME: CPT | Performed by: PHYSICIAN ASSISTANT

## 2017-08-15 PROCEDURE — 83735 ASSAY OF MAGNESIUM: CPT | Performed by: STUDENT IN AN ORGANIZED HEALTH CARE EDUCATION/TRAINING PROGRAM

## 2017-08-15 PROCEDURE — 80048 BASIC METABOLIC PNL TOTAL CA: CPT | Performed by: STUDENT IN AN ORGANIZED HEALTH CARE EDUCATION/TRAINING PROGRAM

## 2017-08-15 PROCEDURE — 99152 MOD SED SAME PHYS/QHP 5/>YRS: CPT

## 2017-08-15 PROCEDURE — 87205 SMEAR GRAM STAIN: CPT | Performed by: PHYSICIAN ASSISTANT

## 2017-08-15 PROCEDURE — 97116 GAIT TRAINING THERAPY: CPT | Mod: GP

## 2017-08-15 PROCEDURE — 27211039 ZZH NEEDLE CR2

## 2017-08-15 PROCEDURE — 97530 THERAPEUTIC ACTIVITIES: CPT | Mod: GP

## 2017-08-15 PROCEDURE — 99153 MOD SED SAME PHYS/QHP EA: CPT

## 2017-08-15 PROCEDURE — 25000128 H RX IP 250 OP 636: Performed by: PHYSICIAN ASSISTANT

## 2017-08-15 PROCEDURE — 49406 IMAGE CATH FLUID PERI/RETRO: CPT

## 2017-08-15 PROCEDURE — 27210732 ZZH ACCESSORY CR1

## 2017-08-15 PROCEDURE — 40000802 ZZH SITE CHECK

## 2017-08-15 PROCEDURE — 25000132 ZZH RX MED GY IP 250 OP 250 PS 637: Performed by: COLON & RECTAL SURGERY

## 2017-08-15 PROCEDURE — 80197 ASSAY OF TACROLIMUS: CPT | Performed by: STUDENT IN AN ORGANIZED HEALTH CARE EDUCATION/TRAINING PROGRAM

## 2017-08-15 PROCEDURE — 27210432 ZZH NUTRITION PRODUCT RENAL BASIC LITER

## 2017-08-15 PROCEDURE — 25000128 H RX IP 250 OP 636: Performed by: RADIOLOGY

## 2017-08-15 PROCEDURE — 40000193 ZZH STATISTIC PT WARD VISIT

## 2017-08-15 PROCEDURE — 25000132 ZZH RX MED GY IP 250 OP 250 PS 637: Performed by: SURGERY

## 2017-08-15 PROCEDURE — 25000132 ZZH RX MED GY IP 250 OP 250 PS 637: Performed by: PHYSICIAN ASSISTANT

## 2017-08-15 PROCEDURE — 27210913 ZZH NUTRITION PRODUCT INTERMEDIATE PACKET

## 2017-08-15 PROCEDURE — 74176 CT ABD & PELVIS W/O CONTRAST: CPT

## 2017-08-15 PROCEDURE — 85027 COMPLETE CBC AUTOMATED: CPT | Performed by: STUDENT IN AN ORGANIZED HEALTH CARE EDUCATION/TRAINING PROGRAM

## 2017-08-15 PROCEDURE — 27210903 ZZH KIT CR5

## 2017-08-15 PROCEDURE — 25000132 ZZH RX MED GY IP 250 OP 250 PS 637: Performed by: NURSE PRACTITIONER

## 2017-08-15 PROCEDURE — 87799 DETECT AGENT NOS DNA QUANT: CPT | Performed by: STUDENT IN AN ORGANIZED HEALTH CARE EDUCATION/TRAINING PROGRAM

## 2017-08-15 PROCEDURE — C1729 CATH, DRAINAGE: HCPCS

## 2017-08-15 PROCEDURE — 84100 ASSAY OF PHOSPHORUS: CPT | Performed by: STUDENT IN AN ORGANIZED HEALTH CARE EDUCATION/TRAINING PROGRAM

## 2017-08-15 PROCEDURE — 25000125 ZZHC RX 250: Performed by: PHYSICIAN ASSISTANT

## 2017-08-15 PROCEDURE — 36415 COLL VENOUS BLD VENIPUNCTURE: CPT | Performed by: STUDENT IN AN ORGANIZED HEALTH CARE EDUCATION/TRAINING PROGRAM

## 2017-08-15 RX ORDER — FENTANYL CITRATE 50 UG/ML
25-50 INJECTION, SOLUTION INTRAMUSCULAR; INTRAVENOUS EVERY 5 MIN PRN
Status: DISCONTINUED | OUTPATIENT
Start: 2017-08-15 | End: 2017-08-15 | Stop reason: HOSPADM

## 2017-08-15 RX ORDER — NALOXONE HYDROCHLORIDE 0.4 MG/ML
.1-.4 INJECTION, SOLUTION INTRAMUSCULAR; INTRAVENOUS; SUBCUTANEOUS
Status: DISCONTINUED | OUTPATIENT
Start: 2017-08-15 | End: 2017-08-15 | Stop reason: HOSPADM

## 2017-08-15 RX ORDER — SODIUM CHLORIDE, SODIUM LACTATE, POTASSIUM CHLORIDE, CALCIUM CHLORIDE 600; 310; 30; 20 MG/100ML; MG/100ML; MG/100ML; MG/100ML
INJECTION, SOLUTION INTRAVENOUS CONTINUOUS
Status: DISCONTINUED | OUTPATIENT
Start: 2017-08-15 | End: 2017-08-16

## 2017-08-15 RX ORDER — AMPICILLIN AND SULBACTAM 2; 1 G/1; G/1
3 INJECTION, POWDER, FOR SOLUTION INTRAMUSCULAR; INTRAVENOUS
Status: CANCELLED | OUTPATIENT
Start: 2017-08-15

## 2017-08-15 RX ORDER — FLUMAZENIL 0.1 MG/ML
0.2 INJECTION, SOLUTION INTRAVENOUS
Status: DISCONTINUED | OUTPATIENT
Start: 2017-08-15 | End: 2017-08-15 | Stop reason: HOSPADM

## 2017-08-15 RX ADMIN — SULFAMETHOXAZOLE AND TRIMETHOPRIM 80 MG: 200; 40 SUSPENSION ORAL at 08:06

## 2017-08-15 RX ADMIN — MIDAZOLAM HYDROCHLORIDE 1 MG: 1 INJECTION, SOLUTION INTRAMUSCULAR; INTRAVENOUS at 11:33

## 2017-08-15 RX ADMIN — Medication 1 PACKET: at 21:09

## 2017-08-15 RX ADMIN — Medication 250 MG: at 21:07

## 2017-08-15 RX ADMIN — PIPERACILLIN AND TAZOBACTAM 3.38 G: 3; .375 INJECTION, POWDER, LYOPHILIZED, FOR SOLUTION INTRAVENOUS; PARENTERAL at 14:05

## 2017-08-15 RX ADMIN — Medication 5 ML: at 08:06

## 2017-08-15 RX ADMIN — TACROLIMUS 110 MCG/HR: 5 INJECTION, SOLUTION INTRAVENOUS at 21:07

## 2017-08-15 RX ADMIN — Medication 80 MG: at 08:05

## 2017-08-15 RX ADMIN — Medication 250 MG: at 08:06

## 2017-08-15 RX ADMIN — Medication 1 PACKET: at 08:07

## 2017-08-15 RX ADMIN — HUMAN INSULIN 2 UNITS/HR: 100 INJECTION, SOLUTION SUBCUTANEOUS at 08:13

## 2017-08-15 RX ADMIN — FLUDROCORTISONE ACETATE 0.1 MG: 0.1 TABLET ORAL at 21:07

## 2017-08-15 RX ADMIN — LINEZOLID 600 MG: 600 INJECTION, SOLUTION INTRAVENOUS at 13:00

## 2017-08-15 RX ADMIN — LOPERAMIDE HYDROCHLORIDE 4 MG: 2 SOLUTION ORAL at 13:04

## 2017-08-15 RX ADMIN — FENTANYL CITRATE 100 MCG: 50 INJECTION, SOLUTION INTRAMUSCULAR; INTRAVENOUS at 11:33

## 2017-08-15 RX ADMIN — Medication 5 ML: at 08:05

## 2017-08-15 RX ADMIN — HUMAN INSULIN 2 UNITS/HR: 100 INJECTION, SOLUTION SUBCUTANEOUS at 17:05

## 2017-08-15 RX ADMIN — ASCORBIC ACID TAB 250 MG 250 MG: 250 TAB at 08:05

## 2017-08-15 RX ADMIN — LOPERAMIDE HYDROCHLORIDE 4 MG: 2 SOLUTION ORAL at 08:06

## 2017-08-15 RX ADMIN — Medication 5 ML: at 21:07

## 2017-08-15 RX ADMIN — Medication 220 MG: at 08:06

## 2017-08-15 RX ADMIN — SODIUM CHLORIDE, POTASSIUM CHLORIDE, SODIUM LACTATE AND CALCIUM CHLORIDE: 600; 310; 30; 20 INJECTION, SOLUTION INTRAVENOUS at 10:30

## 2017-08-15 RX ADMIN — PIPERACILLIN AND TAZOBACTAM 3.38 G: 3; .375 INJECTION, POWDER, LYOPHILIZED, FOR SOLUTION INTRAVENOUS; PARENTERAL at 06:17

## 2017-08-15 RX ADMIN — Medication 1 PACKET: at 14:05

## 2017-08-15 RX ADMIN — SODIUM BICARBONATE 650 MG TABLET 1300 MG: at 15:01

## 2017-08-15 RX ADMIN — PIPERACILLIN AND TAZOBACTAM 3.38 G: 3; .375 INJECTION, POWDER, LYOPHILIZED, FOR SOLUTION INTRAVENOUS; PARENTERAL at 00:53

## 2017-08-15 RX ADMIN — GANCICLOVIR SODIUM 650 MG: 500 INJECTION, POWDER, LYOPHILIZED, FOR SOLUTION INTRAVENOUS at 17:06

## 2017-08-15 RX ADMIN — Medication 5 ML: at 13:04

## 2017-08-15 RX ADMIN — AMLODIPINE BESYLATE 5 MG: 10 TABLET ORAL at 13:04

## 2017-08-15 RX ADMIN — SODIUM BICARBONATE 650 MG TABLET 1300 MG: at 08:05

## 2017-08-15 RX ADMIN — LIDOCAINE HYDROCHLORIDE 10 ML: 10 INJECTION, SOLUTION EPIDURAL; INFILTRATION; INTRACAUDAL; PERINEURAL at 11:34

## 2017-08-15 RX ADMIN — PANTOPRAZOLE SODIUM 40 MG: 40 TABLET, DELAYED RELEASE ORAL at 08:05

## 2017-08-15 RX ADMIN — SODIUM BICARBONATE 650 MG TABLET 1300 MG: at 21:07

## 2017-08-15 RX ADMIN — MICONAZOLE NITRATE: 20 CREAM TOPICAL at 08:58

## 2017-08-15 RX ADMIN — PIPERACILLIN AND TAZOBACTAM 3.38 G: 3; .375 INJECTION, POWDER, LYOPHILIZED, FOR SOLUTION INTRAVENOUS; PARENTERAL at 20:20

## 2017-08-15 RX ADMIN — DEXTROSE MONOHYDRATE: 100 INJECTION, SOLUTION INTRAVENOUS at 10:30

## 2017-08-15 RX ADMIN — LOPERAMIDE HYDROCHLORIDE 4 MG: 2 SOLUTION ORAL at 21:07

## 2017-08-15 RX ADMIN — FLUDROCORTISONE ACETATE 0.1 MG: 0.1 TABLET ORAL at 08:05

## 2017-08-15 NOTE — PLAN OF CARE
Problem: Goal Outcome Summary  Goal: Goal Outcome Summary  OT 7A: cancel, pt in down in IR upon AM attempt, unable to check back in with schedule, will reschedule.

## 2017-08-15 NOTE — PROGRESS NOTES
"[facility]  Transplant Infectious Diseases Progress Note      Camacho Bhagat MRN# [id number]   YOB: 1965 Age: 51 year old   Date of Service: 8/15/2017        Assessment and Recommendations:     Recommendations:  - Continue empiric Zosyn indefinitely for the right retroperitoneal abscess, new intraperitoneal gas foci in setting of ongoing ascites and apparent secondary peritonitis;  would favor eventually trying a switch to oral (levofloxacin / metronidazole) on the presumption that he has an unclosed persistent enterostomy with ongoing seeding of his peritoneum  - Recommend discontinuing Linezolid (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  - Continue TMP-SMX prophylaxis.  - Continue PO ganciclovir to \"high-maintenance\" dose at 7.5 mg / kg daily  - Leukopenia likely d/t Ganciclovir, recommend continuing Ganciclovir as above for now  - Add on differential to CBC --> If ANC <1.0, recommend G-CSF x1 (300mcg) and re-check ANC in am before re-dosing   - Re-check CRP, pro-calcitonin  - Will f/u on fluid studies and cultures sent from IR drain of intra-peritoneal fluid 8/15    Transplant ID will continue to follow.    Patient seen and discussed with attending physician, Dr. Del Toro.     Reshma Concepcion MD  Internal Medicine, PGY-3  [phone]    Assessment:  A 51 year old gentleman immunosuppressed (tacrolimus; MMF on hold; received basiliximab initial induction) s/p liver transplant on 3/4/17 for CASTAÑEDA who was admitted 7/4/17 with colitis and underwent 7/26/17 exploratory laparotomy after forming a RUQ phlegmon possibly " due to colonic perforation.  The initial admission diagnosis was neutropenic colitis, but new primary seroconversion CMV viremia was discovered on 7/10/17.  He had persistent fevers from 7/10 - 15/17 and again from 7/21 - 8/4/17, was afebrile for three+ days, but has spiked renewed fevers (up to 100.7 degrees) since 8/7/17 late evening and is looking septic with increased malaise and abdominal pain, obtundation, increased delirium, rising inflammatory markers, and a higher peripheral WBC.  Repeat 8/9/17 abdominal CT scan showed right sided free air.  A RUQ retroperitoneal abscess drain was placed by IR on 8/9/17 and broad-spectrum antibiotic therapy resumed -- since then he has improved and remains clinically stable as of 8/15 with no recurrent decompensation.     ID issues:    # Renewed peritoneal sepsis / RUQ abdominal abscess noted on 8/9, unchanged on 8/14  # Persistent Ascites with new foci intraperitoneal gas on CT 8/14  Off antimicrobails (except TMP-SMX and Valcyte) for five days (8/4 - 8/8), after completing a ten day 7/25 - 8/3/17 empiric course of meropenem / daptomycin / micafungin.  On 8/9 pt again developed sepsis with a markedly increased procalcitonin, increased serum CRP, increased abdominal pain, and worsened confusion / obtundation.  8/9/17 chest / abdomen CT scan demonstrated little evidence of new infection above the diaphragm, but new infra-hepatic free air and an unchanged (versus the 8/4/17 abdominal CT scan) right retroperitoneal RUQ multifocal gas collection suggestive for an abscess.  Interventional Radiology placed a drain into that abscess 8/9/17 afternoon -- cultures have grown only a Clostridium species in the anaerobic broth culture.  Other potential sites for his sepsis beyond the abdomen were lacking and blood cultures remain negative.  On resumed empiric antibiotic therapy with Zosyn / linezolid since 8/9/17, his sepsis has resolved and he is again looking better with less  "obtundation.  All this has occured is in the wake of an exploratory laparotomy on 7/26/17 with a right angelique-colectomy / end ileostomy / mucous fistula / partial omentectomy -- intra-operatively no overt perforation was seen at that time, but right colitis was observed with a probable intra-adominal abscess or phlegmon (thought possibly due to a colonic perforation which was seen on the excised colonic histopathology).was found in BALs, and 8/5/17 ascites studies / cultures remain unremarkable.  While the sepsis seems presently controlled with drainage and broad-spectrum empiric antibiotics, his course, ongoing ascites fluid cell counts evidence of peritonitis (although ascites fluid cultures are suppressed on antibiotics), and the presence of Clostridium in the 8/9/17 anaerobic drainage culture implies that he very well may have an ongoing enterostomic leak into his peritoneum.  Repeat CT abd/pelvis done 8/14 demonstrates stable to mildly improved RP fluid collection, ongoing ascites, and a new foci of intraperitoneal gas that may be attributed to redistribution of peritoneal gas vs. Gas producing infection vs perforation or dehiscence of  Bowel. These CT findings further support concern for ongoing enterostomic leak. Discussed briefly with transplant surgery this am. Patient went for sinogram and additional drain placement today; while certainly it is concerning that drains alone will not provide adequate source control if there is an enterostomic leak; however patient is extremely high risk for re-operation and surgeons would prefer to manage with drains and aniti-microbials and continue to monitor. Lastly, unclear that patient still requires empiric linezolid therapy (or any VRE coverage). Would recommend discontinuing Linezolid with close monitoring at this time.     # CMV viremia /  CMV colitis:    He presented with a \"detected\" low level CMV viremia (detected < 137 IU / ml) upon admission and developed a " "progressively worsening viremia after stopping his Valcyte prophylaxis.  Valcyte was re-started on 7/15/17 when the blood CMV PCR viral load was 4,225 IU / ml (3.6 log) and that was switched to ganciclovir on 7/20/17 with a viral load of 1,906 IU /ml (3.3 log). After one week of GCV treatment, the blood CMV PCR dropped to 290 IU / ml (2.5 log) on 7/27/17 and decreased further to < 137 IU / ml (< 2.1 log) on 8/3/17 (a > 1 log drop in the viremia over two weeks).  The CMV immunostain of colonic tissue biopsy was positive; confirming a CMV etiology of his colitis.  Because of an initial slow pace of viremia decline, he had been treated with supra-high dose ganciclovir (doubled to 10 mg / kg / dose BID) for the possibility of ganciclovir resistance, but with the improving viral load on 8/3/17, his ganciclovir dose was reduced to 7.5 mg / kg IV BID on 8/4/17 PM.  On that, the 8/10/17 repeat CMV viral load was again undetectable, so he was decreased to \"high maintenance\" ganciclovir dosing at 7.5 mg / kg / day on 8/11/17.  Weekly blood CMV PCR viral load checks (next due to be checked 8/17/17) will continue to make certain they remain suppressed at this maintenance dose.  (A CMV drug resistance genotype assay ordered on 7/27/17 went astray, so we have no genotypic data to help with decisions.) Of note, Ganciclovir likely responsible for leukopenia but not preferable to hold or decrease dose given clinical context.     # Hx Pancytopenia / Recurrent Leukopenia:   Patient has known hx pancytopenia in the past, attributed to Valcyte and Bactrim. Has ongoing chronic anemia and thrombocytopenia this admission, though leukopenia had resolved while intermittently septic with normal WBC during most of admission. Over past 72 hours beginning 8/12 patient has had leukopenia; no differential completed during that time so unclear if neutropenic. Suspect this is due to IV Ganciclovir, especially given that patient has received " supra-therapeutic doses this admission given c/f low level CMV resistance to ganciclovir. As patient is recovering from CMV viremia and remains at high risk for recurrence, would recommend treating with G-CSF as needed as opposed to holding or decreasing dose of Ganciclovir. Would add on differential to CBC and treat only for ANC <1.0,      # Improving encephalopathy:  His delirium and somnolence seem to vary with the state of his overall health and abdominal sepsis, so likely represent toxic-metabolic encephalopathy associated with sepsis and also somewhat with medications side effects superimposed on chronic hepatic encephalopathy.  A 7/20/17 brain MRI scan was unremarkable and a repeat brain MRI has not yet been deemed needed.  An 8/5/17 serum cryptococcal antigen assay was negative.  The likelihood of any CNS infection at this time is exceeding low and additional CNS evaluation is presently not needed.    # Improved EBV viremia:  Initially discovered to have EBV viremia at 406,697  / ml on 7/18/17.  With decreased immunosuppression, that had fallen to 6,864  / ml by 8/1/17.  Checking blood EBV PCRs every couple weeks is reasonable for now.  Random colonic biopsies from the 7/21/17 colonoscopy and 7/26/17 hemicolectomy histopathology results were not suggestive for PTLD. Repeat EBV PCR sent today (8/15) will follow up results    # S/p liver transplant:  Continue on tacrolimus; but MMF was held 6/27/17 in the context of sepsis and pancytopenia    # Recurrent EJ:  Patient initially with EJ attributed to severe sepsis, Cr normalized to near 1 but is no up-trending again ~1.8. Possibly d/t CNI toxicity (Tacro level 8.2 and dose decreased 8/14) and also d/t ?ongoing infection. Per primary team note possible plans to consult Nephrology.     Resolved ID Issues:   - Single colony of VRE in BAL 7/12/2017 and polymicrobial growth on BAL 7/15/2017 with Coag Neg Staph, P putida group, C albicans:      These were  likely all colonizers or contaminants.  Nevertheless, he received a full ten day (7/25 - 8/3/17) daptomycin course for severe sepsis in the setting of colonization with VRE and remains on Linezolid for empiric therapy at this time  -  Staphylococcus capitis in ascitic fluid 7/5/17:  Was culture contamination.   - Rhinovirus in BAL 7/15/2017:  Likely to be due to chronic shedding, since his main presentation was abdominal, not respiratory.    Other ID issues:  - PCP prophylaxis: TMP-SMX was held in 6/17 due to neutropenia, but resumed 8/4/17.  - Serostatus:  CMV D+/R-, EBV D+/R-, HSV1?/2?, VZV ?.  Presently on IV ganciclovir.  - Immunization status: up-to-date.  - Gamma globulin status: >1660 as of 7/24/2017  - Isolation: Contact isolation for a history of VRE carriage.    Interval History:  Mr. Bhagat was febrile to 100.7 degrees 8/7 - 8/9/17 with a rising procalcitonin / CRP and worsening obtundation, so repeat 8/9/17 chest / abdominal CT scan was performed showing persistent (versus the 8/4/17 abdominal CT scan) right retroperitoneal RUQ multifocal gas collection with new intraperitoneal free air below the liver.  Interventional Radiology placed a right retroperitoneal drain into a presumed abscess on 8/9/17 and he has been on empiric Zosyn plus linezolid (for his history of VRE) since 8/9/17 (as well as ongoing IV ganciclovir since 7/20/17 and TMP-SMX prophylaxis since 8/4/17).  He has subsequently been afebrile with down-trending WBC (now leukopenic). All other clinical symptoms improving (decreasing abdominal pain, slowly improving obtundation, slightly more interactive though remains disoriented. Is able to hold conversation though easily confused.  Other than 8/9/17 drainage anaerobic culture  growing a Clostridium species in broth (at 48 hours incubation) all other recent culture data remains NGTD.  CT abd/pelvis 8/14 notable for new foci of intraperitoneal gas not present on CT 8/9 as well as ongoing  moderate ascites and RP fluid collection with migrated drain. Plan for patient to go to IR today for sinogram and replacement of drain. EBV PCR also sent today. Otherwise clinically unchanged today.     [facility] of Infectious Disease Illness (copied from the 8/4/17 Transplant ID Note):   A 51 year old gentleman with PMH of DM, HTN, fibromyalgia, previous DVT with IVC filter,  s/p liver transplant secondary to ESLD due to CASTAÑEDA on 03/04/2017, CMV D+/R-, EBV D+/R-, who was admitted on 07/04 due to neutropenic enterocolitis starting empiric therapy with zosyn + flagyl + vancomycin + micafungin being transferred to the ICU, requiring exploratory laparotomy + wash out for neutropenic enterocolitis on 07/04, reporting inflamed cecum and ascending colon, having a second look on 07/05 finding improvement of the inflammation and performing closure of abdominal incision; culture resulted S. Capitis considered a contaminant. Valcyte was stopped since admission. On 07/06, flagyl + vancomycin + micafungin were stopped and zosyn changed to ertapenem. As per ID note from 07/06, recommended to switch back ertapenem to zosyn since thrombocytopenia was seen before zosyn treatment, pancytopenia possibly related to medication (MMF, bactrim, valcyte, initially seen at the beginning of June) and recommended to monitor CMV PCR weekly ( on 07/03: positive < 137; before on 06/26 was ND). Additionally, on 07/12 a BAL was repeated and showed VRE/CONS/P putida/C. Albicans, all were considered a colonizers, and primary team continued with ertapenem, held MMF, bactrim, valcyte). On 07/13, it was recommended to re-start valcyte since his CMV PCR was 145 and counts improved. Patient persisted febrile, with evident ascitis tapped but culture resulted negative. Course complicated with thrombosis of the right arterial artery with ischemia to the tip of the ischemic finger. On 07/15, BAL was repeated and was positive for rhinovirus. Valcyte was  re-initiated on 07/15 due to CMV PCR: 4225. PAtient was discharged from the ICU on 07/17. Also, 14 days of ertapenem were completed on 07/18 and EBV PCR was taken on 07/18 and resulted [id number] concerning of PTLD since patient persisted febrile with ascitis and colitis. On 07/20, ZAKIA was switched to GCV due to worsening level of 1906. Patient became encephalopathic but MRI resulted negative. On 07/21 a colonoscopy was performed and showed normal colonic mucosa; random biopsies were taken. On 07/25, patient deteriorated clinically presenting respiratory failure requiring intubation, so was transferred to ICU and started empiric treatment with meropenem + daptomycin + micafungin, and GCV dose was increased to 10 mg BID. A CT abdomen was repeated and showed retroperitoneal air so underwent EL + lysis of adhesions + right hemicolectomy + ileostomy + partial omentectomy  due to ascending colitis (no neida perforation or ischemia). On 07/27 CMV PCR resulted 290. The initial admission diagnosis was neutropenic colitis, but new primary seroconversion CMV viremia was discovered on 7/10/17.  He had persistent fevers from 7/10 - 15/17 and again from 7/21 - 8/4/17, was afebrile for three+ days, but has spiked renewed fevers (up to 100.7 degrees) since 8/7/17 late evening and is looking septic with increased malaise and abdominal pain, obtundation, increased delirium, rising inflammatory markers, and a higher peripheral WBC.  Repeat 8/9/17 abdominal CT scan showed right sided free air.  A RUQ retroperitoneal abscess drain was placed by IR on 8/9/17 and broad-spectrum antibiotic therapy resumed -- since then he has improved.    Transplants:  3/4/2017 (Liver)    Review of Systems:  Difficult to obtain d/t patient's encephalopathy, oriented only to self.   CONSTITUTIONAL:  Afebrile past 4 days no fevers, chills, sweats  EYES: No eye pain, visual changes, or scleral icterus.  ENT:  No rhinorrhea, sinus pain, otalgia, hearing loss,  tinnitus, sore throat, or oral pain.  Left ear lobe pressure ulcer.  LYMPH:  No edema.  RESPIRATORY:  No cough, increased sputum, or dyspnea (on intermittent low flow oxygen).  CARDIOVASCULAR:  No chest pain, palpitations.  GASTROINTESTINAL:  Improved abdominal pain, Significant ascites.  S/p liver transplant.  Dysphagia.  Liquid stool in his ileostomy.  No nausea, vomiting, or constipation.  GENITOURINARY:  Improved EJ.  Crowe in place draining clear yellow urine,  HEME:  Chronic anemia. Recurrent leukopenia, No easy bruising.  ENDOCRINE:  Type 2 diabetes mellitus on insulin.  MUSCULOSKELETAL:  Necrotic toe tips.  Generally deconditioned.  Coccygeal decub.  No new focal myalgias / arthralgias.  SKIN:  No rash or pruritus.  NEURO:  A/O x1 (to self), but able to hold vague conversation about clinical status,  No headache.  PSYCH:  Negative.    Past Medical / Surgical History:  Past Medical History:   Diagnosis Date     Cancer (H)      Depressive disorder, not elsewhere classified      Esophageal reflux      Fibromyalgia 1/2009    dx with Dr Benitez( Rheum)     History of thrombophlebitis      Osteoarthritis      Other acute embolism veins 11/01    Deep vein thrombophlebitis, filter placed     Other and unspecified hyperlipidemia      Other chronic nonalcoholic liver disease     Fatty liver      Other testicular hypofunction      Pneumonia, organism 10-01    Included ARDS, sepsis, and  acute renal failure; hospitalized     Rheumatoid arthritis(714.0)      Type II or unspecified type diabetes mellitus without mention of complication, not stated as uncontrolled 11/01    Managed by endocrinology     Unspecified essential hypertension 11/01    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     Unspecified sleep apnea     Uses BiPAP     Past Surgical History:   Procedure Laterality Date     BENCH LIVER N/A 3/4/2017    Procedure: BENCH LIVER;  Surgeon: Jovan Tran MD;  Location: [facility] OR     C NONSPECIFIC PROCEDURE       tracheostomy     C NONSPECIFIC PROCEDURE      repair of deviated septum     C NONSPECIFIC PROCEDURE      Rt knee arthroscopy     C TOTAL KNEE ARTHROPLASTY      Right knee arthroscopy     CHOLECYSTECTOMY       COLONOSCOPY N/A 2017    Procedure: COMBINED COLONOSCOPY, SINGLE OR MULTIPLE BIOPSY/POLYPECTOMY BY BIOPSY;  Colonoscopy;  Surgeon: Izaiah Montes MD;  Location: [facility] GI     ESOPHAGOSCOPY, GASTROSCOPY, DUODENOSCOPY (EGD), COMBINED N/A 2016    Procedure: COMBINED ESOPHAGOSCOPY, GASTROSCOPY, DUODENOSCOPY (EGD), BIOPSY SINGLE OR MULTIPLE;  Surgeon: Trent Pederson MD;  Location: [facility] GI     LAPAROTOMY EXPLORATORY N/A 2017    Procedure: LAPAROTOMY EXPLORATORY;  Exploratory Laparotomy, washout;  Surgeon: Tip Zhang MD;  Location: [facility] OR     LAPAROTOMY EXPLORATORY N/A 2017    Procedure: LAPAROTOMY EXPLORATORY;  Exploratory Laparotomy, Washout with closure.;  Surgeon: Tip Zhang MD;  Location: [facility] OR     LAPAROTOMY EXPLORATORY N/A 2017    Procedure: LAPAROTOMY EXPLORATORY;  Exploratory Laparotomy, Right angelique-colectomy, end ileostomy, mucosal fistula, partial omentectomy;  Surgeon: Sara Dinh MD;  Location: [facility] OR     TRANSPLANT LIVER RECIPIENT  DONOR N/A 3/4/2017    Procedure: TRANSPLANT LIVER RECIPIENT  DONOR;  Surgeon: Jovan Tran MD;  Location: [facility] OR     Current Scheduled Medications:    amLODIPine  5 mg Oral Daily     fiber modular  1 packet Per Feeding Tube BID     sodium chloride (PF)  20 mL Irrigation Q6H     diphenoxylate-atropine  5 mL Oral 4x Daily     saccharomyces boulardii  250 mg Oral BID     sodium bicarbonate  1,300 mg Per NG tube TID     ascorbic acid  250 mg Oral Daily     sulfamethoxazole-trimethoprim  10 mL Oral Daily     ganciclovir (CYTOVENE) intermittent infusion  7.5 mg/kg Intravenous Q24H     miconazole with skin protectant   Topical BID     linezolid  600 mg Intravenous Q12H      piperacillin-tazobactam  3.375 g Intravenous Q6H     fludrocortisone  0.1 mg Oral BID     multivitamin CF formula  5 mL Per Feeding Tube Daily     zinc sulfate  220 mg Per Feeding Tube Daily     sodium chloride (PF)  3 mL Intracatheter Q8H     loperamide  4 mg Oral or Feeding Tube 4x Daily     insulin aspart   Subcutaneous TID w/meals     protein modular  1 packet Per Feeding Tube TID     aspirin  80 mg Oral Daily     pantoprazole  40 mg Oral or Feeding Tube Daily     sodium chloride (PF)  3 mL Intracatheter Q8H     Physical Examination:  Vital sign ranges over the past 24 hours:  Temp:  [97.4  F (36.3  C)-99.2  F (37.3  C)] 98.6  F (37  C)  Pulse:  [90-98] 90  Heart Rate:  [] 94  Resp:  [20] 20  BP: (142-172)/(78-92) 170/89  SpO2:  [94 %-98 %] 94 %    Intake/Output Summary (Last 24 hours) at 08/11/17 1705  Last data filed at 08/11/17 1700   Gross per 24 hour   Intake           2283.2 ml   Output             1705 ml   Net            578.2 ml     Physical Examination:  GENERAL:  Sleepy but arousable, able to hold general conversation but oriented only to self, 51 year old man supine in bed in NAD.  EYES:  EOMI, anicteric sclerae.  ENT:  No otorrhea.  NJ tube present.   No anterior oral lesion.  NECK:  Supple.  LYMPH:  No cervical lymphadenopathy.  LUNGS:  Few bilaterally scattered coarse rhonchi loudest in bases on anterior auscultation   CARDIOVASCULAR:  Regular rate and rhythm, tachycardia resolved, normal S1, S2, without murmur, gallop, or rub.  ABDOMEN:  Normal bowel sounds, soft, slight generalized tenderness, worst on right side, mildly distended, midline stapled wound lacks inflammation with dressed superior mucous fistula, RLQ ileostomy with liquid stool, right TABITHA drain with serous drainage, new RLQ pigtail catheter in place draining minimal yellow fluid    :  Crowe with hardik urine.  EXTREMITIES:  Distally warm, no edema,  ischemic right hallux and right second toe, also ischemic tip of left second  toe, and right index, no ulcers.  Right PICC line site lacks inflammation.  NEUROLOGIC:  Responding, oriented x 1.5, moves extremities x 4.    Inflammatory Markers    Recent Labs   Lab Test  08/09/17   0711  08/07/17   0549  08/06/17   0633  08/05/17   0616  07/05/17   1810  03/05/17   0358  11/23/16   0813  11/16/16   0706   08/15/11   1151  04/11/11   1209  01/03/11   1246  02/15/10   1232   SED   --    --    --    --    --    --   45*   --    --   11  14  17*  25*   CRP  190.0*  130.0*  130.0*  140.0*  354.0  20.0*  58.0*  59.1*   < >  11.1*  9.0*  11.7*  17.5*    < > = values in this interval not displayed.     Immune Globulin Studies     Recent Labs   Lab Test  07/24/17   0705  08/15/11   1243   IGG  1660*  1160   IGG1   --   734   IGG2   --   294   IGG3   --   7*   IGG4   --   59     Metabolic Studies       Recent Labs   Lab Test  08/15/17   0639  08/14/17   0621  08/14/17   0020  08/13/17   0631  08/13/17   0027  08/12/17   0630  08/11/17   0650  08/10/17   2135   08/10/17   0704   08/01/17   0651   07/25/17   0945   07/06/17   0316   NA  140  143   --   142   --   143  140   --    --   140   < >  150*   < >  137   < >  143   POTASSIUM  4.3  4.4   --   4.5   --   4.4  4.9  5.1   < >  5.5*   < >  4.3   < >  4.0   < >  5.0   CHLORIDE  115*  114*   --   116*   --   115*  114*   --    --   112*   < >  123*   < >  104   < >  116*   CO2  18*  16*   --   17*   --   22  19*   --    --   18*   < >  20   < >  26   < >  18*   ANIONGAP  8  13   --   9   --   7  8   --    --   10   < >  7   < >  7   < >  9   BUN  75*  71*   --   66*   --   68*  64*   --    --   61*   < >  51*   < >  77*   < >  62*   CR  1.87*  1.92*   --   1.94*   --   1.82*  1.82*   --    --   1.70*   < >  0.90   < >  2.60*   < >  2.75*   GFRESTIMATED  38*  37*   --   36*   --   39*  39*   --    --   42*   < >  89   < >  26*   < >  24*   GLC  112*  133*   --   113*   --   147*  112*   --    --   121*   < >  141*   < >  382*   < >  139*   A1C   --    --     --    --    --    --    --    --    --    --    --    --    --    --    --   Canceled, Test credited   Below Assay Range  NOTIFIED LEONARD ONEILL AT 0538 ON 7/6/17 BY CHRISSY     JACOB  8.1*  8.4*   --   8.4*   --   8.6  8.5   --    --   8.5   < >  8.1*   < >  7.6*   < >  6.9*   PHOS  4.0  5.1*   --   5.6*   --   5.7*  5.4*   --    --   4.6*   < >  3.1   < >   --    < >  5.5*   MAG  1.9  2.1   --   2.1   --   2.1  2.0   --    --   1.9   < >  1.9   < >   --    < >  2.1   LACT   --    --   0.7   --   1.0   --    --    --    --    --    < >   --    < >   --    < >  1.5   CKT   --    --    --    --    --    --    --    --    --    --    --   16*   --   40   --    --     < > = values in this interval not displayed.     Hepatic Studies    Recent Labs   Lab Test  08/14/17   0621  08/10/17   1323  08/10/17   0704  08/08/17   0600  08/02/17   0543  07/27/17   0410  07/25/17   1651   07/25/17   0017   07/11/17   0328   BILITOTAL  0.7   --   2.2*  0.5  0.4  1.3  0.6   < >   --    < >  0.5   ALKPHOS  271*   --   136  172*  199*  143  242*   < >   --    < >  158*   ALBUMIN  1.8*   --   1.9*  2.1*  2.3*  2.4*  2.0*   < >   --    < >  1.8*   AST  24   --   36  28  62*  27  61*   < >   --    < >  34   ALT  17   --   26  25  42  27  50   < >   --    < >  27   LDH   --   168   --    --    --    --    --    --   258*   --    --    GGT   --    --    --    --    --    --    --    --    --    --   58    < > = values in this interval not displayed.     Pancreatitis testing    Recent Labs   Lab Test  07/24/17   0705  07/17/17   0630  07/12/17   0342  07/10/17   0340  07/05/17   0346  03/05/17   0358  03/03/17   1458  02/21/17   1520  12/09/16   1159  02/08/16   1144   09/30/10   1734   AMYLASE   --    --    --    --    --   53  70   --    --    --    --   82   LIPASE   --    --    --    --    --   216   --   326  161   --    --   138   TRIG  303*  168*  114  177*  174*   --    --    --    --   99   < >   --     < > = values in this interval  not displayed.     Hematology Studies      Recent Labs   Lab Test  08/15/17   0639  08/14/17   0621  08/13/17   0631  08/12/17   0630  08/11/17   0650  08/10/17   2135  08/10/17   0704   08/07/17   0549   08/06/17   0633   08/05/17   0616  08/04/17   0548  08/03/17   0533  08/02/17   0543   WBC  2.7*  3.4*  3.3*  2.8*  4.3   --   6.1   < >  5.5   --   5.2   --   6.3  6.9  5.1  5.0   ANEU   --    --    --    --    --    --    --    --   3.7   --   3.5   --   3.9  4.1  2.8  2.7   ALYM   --    --    --    --    --    --    --    --   1.5   --   1.5   --   2.2  2.5  1.9  2.0   ZINA   --    --    --    --    --    --    --    --   0.2   --   0.2   --   0.2  0.3  0.3  0.2   AEOS   --    --    --    --    --    --    --    --   0.1   --   0.0   --   0.0  0.0  0.0  0.1   HGB  7.4*  8.1*  8.4*  7.9*  7.9*  6.3*  7.1*   < >  7.7*   < >  7.6*   < >  6.9*  7.8*  7.3*  7.6*   HCT  23.3*  25.2*  26.4*  24.5*  24.3*   --   21.3*   < >  23.7*   --   23.2*   --   20.9*  24.3*  23.5*  24.4*   PLT  118*  129*  156  132*  140*   --   129*   < >  116*   --   94*   --   94*  99*  92*  90*    < > = values in this interval not displayed.     Arterial Blood Gas Testing    Recent Labs   Lab Test  08/10/17   1515  08/02/17   1216  07/26/17 2243  07/26/17 2133 07/26/17 2017 07/25/17   1746   PH  7.33*  7.37  Incorrect specimen type  NOTTIED BY WOJCIECH DEWITT, NEW ORDER TO REPLACE.7/26/17 AT 2346 BY ZAINAB.  CORRECTED ON 07/26 AT 2350: PREVIOUSLY REPORTED AS 7.27     --    --   Canceled, Test credited   Incorrect specimen type    7.32*   PCO2  34*  31*  Incorrect specimen type  NOTTIED BY WOJCIECH DEWITT, NEW ORDER TO REPLACE.7/26/17 AT 2346 BY ZAINAB.  CORRECTED ON 07/26 AT 2350: PREVIOUSLY REPORTED AS 45     --    --   Canceled, Test credited   Incorrect specimen type    42   PO2  68*  69*  Incorrect specimen type  NOTTIED BY WOJCIECH DEWITT, NEW ORDER TO REPLACE.7/26/17 AT 2346 BY ZAINAB.  CORRECTED ON 07/26 AT 2350: PREVIOUSLY REPORTED AS 53     --     --   Canceled, Test credited   Incorrect specimen type    81   HCO3  18*  18*  Incorrect specimen type  NOTTIED BY WOJCIECH DEWITT, NEW ORDER TO REPLACE.7/26/17 AT 2346 BY ZAINAB.  CORRECTED ON 07/26 AT 2350: PREVIOUSLY REPORTED AS 20     --    --   Canceled, Test credited   Incorrect specimen type    22   O2PER  2L  21  Incorrect specimen type  NOTTIED BY WOJCIECH DEWITT, NEW ORDER TO REPLACE.7/26/17 AT 2346 BY ZAINAB.  CORRECTED ON 07/26 AT 2350: PREVIOUSLY REPORTED AS 40    40  62   < >  100.0  100  40.0    < > = values in this interval not displayed.     Urine Studies     Recent Labs   Lab Test  08/10/17   1752  08/08/17   1600  08/05/17   0617  07/28/17   1042  07/25/17   1205   URINEPH  5.0  5.0  5.0  5.0  5.0   NITRITE  Negative  Negative  Negative  Negative  Negative   LEUKEST  Negative  Negative  Negative  Negative  Trace*   WBCU  10*  7*  5*  6*  5*     Vancomycin Levels     Recent Labs   Lab Test  07/06/17   0316  10/28/16   1405   VANCOMYCIN  22.1  14.4     Procalcitonins:  8/12 9.21  8/9 7.55  8/8 3.11  8/5 1.06  8/2 0.78    Microbiology:  8/11 Ascites cx:  NGTD.  Gram stain:  NOS, many WBCs (predominately PMNs).  Anaerobic / fungal cxs NGTD.  Fluid analysis:  Yellow, cloudy, WBC 5,038 (83% N, 1% L, 16% M), protein 4.0, , glucose 5, amylase 19, bilirubin 1.2.  8/11 Ur cx NGTD  8/9 Right retroperitoneal abscess drainage aerobic cx:  NGTD.  Gram stain:  NOS, many WBCs (predominately PMNs).  Anaerobic cx:  Clostridium sps isolated in broth at 48 hours of incubation.  8/9 x 2, 8/4 x 2, 8/2 x 2, 7/31 x 2, 7/28 x 2, 7/25 x 2, 7/15 Bld cxs NGTD / negative  8/5 ascites aerobic / anaerobic / fungal cxs NGTD.  Gram stain:  NOS, few WBCs (predominately PMNs), moderate RBCs.  Fluid analysis:  Yellow, slightly cloudy,  (91%N, 6% L, 3% M), albumin 1.6, protein 3.8.  8/5, 7/25 Ur cxs:  Negative  8/5 Serum CRAG negative  7/29 Sp cx:  Normal elvie, heavy C albicans  7/26 Ascites (OR) cx:  Negative.   Gram stain:   NOS, few WBCs.  7/25 Ascites cx: Negative.  Gram stain:  NOS, no WBCs  7/15 BAL fluid studies:  Yeast and Rhinovirus  7/12 BAL fluid studies:  Single colony of VRE, C albicans/dubliniensis   7/11 Sp cx:  VRE and Coag Neg Staph   7/5 Ascites cx:  S capitis    CMV viral loads    Recent Labs   Lab Test  08/10/17   0704  08/03/17   0533  07/27/17   1109  07/20/17   1427  07/18/17   0530   CSPEC  EDTA PLASMA  CORRECTED ON 08/11 AT 0714: PREVIOUSLY REPORTED AS Whole Blood    EDTA PLASMA  EDTA PLASMA  CORRECTED ON 07/28 AT 0840: PREVIOUSLY REPORTED AS Blood    Plasma  Plasma   CMVLOG  <2.1  <2.1  2.5*  3.3*  3.0*     CMV viral loads    Log IU/mL of CMVQNT   Date Value Ref Range Status   08/10/2017 <2.1 <2.1 [Log_IU]/mL Final   08/03/2017 <2.1 <2.1 [Log_IU]/mL Final   07/27/2017 2.5 (H) <2.1 [Log_IU]/mL Final   07/20/2017 3.3 (H) <2.1 [Log_IU]/mL Final   07/18/2017 3.0 (H) <2.1 [Log_IU]/mL Final   07/15/2017 3.6 (H) <2.1 [Log_IU]/mL Final   07/10/2017 2.2 (H) <2.1 [Log_IU]/mL Final   07/03/2017 <2.1 <2.1 [Log_IU]/mL Final   06/26/2017 Not Calculated <2.1 [Log_IU]/mL Final     CMV resistance testing    EBV DNA Copies/mL   Date Value Ref Range Status   08/01/2017 6864 (A) EBVNEG [Copies]/mL Final   07/18/2017 422719 (A) EBVNEG [Copies]/mL Final     Pathology:   Colectomy path 7/26/17  - Colonic wall with perforation, edema, and acute serositis   - Background colonic mucosa with no significant histologic abnormality   - CMV immunostain is positive in rare (3) cells   - Terminal ileum with no significant histologic abnormality   - Viable, unremarkable surgical margins   - One benign lymph node (0/1)   - Appendix with fibrous obliteration of the tip     Colon biopsies 7/21/2017   A: Colon biopsy, ascending   B: Colon biopsy, descending   FINAL DIAGNOSIS:   A. COLON BIOPSY, ASCENDING:   - Colonic mucosa with no significant histologic abnormality   - Negative for intraepithelial lymphocytosis or deposition of   subepithelial thick  collagenous band   B. COLON BIOPSY, DESCENDING:   - Crypt architecture distortion, consistent with healed prior injury   - Negative for active inflammation or dysplasia   - Negative for intraepithelial lymphocytosis or deposition of   subepithelial thick collagenous band     Cytology of ascitic fluid 2017   PERITONEAL FLUID, ASCITES:   -Negative for malignancy   Cytology of ascitic fluid 2017.   Peritoneal fluid, ascites:   - Negative for malignancy   - Acute and chronic inflammation present   Ascites fluid:  Rare to absent viable B cells.  (This specimen was collected on 17 but was not ordered/delivered to   lab/run until 17 - results should be interpreted with caution due   to low cellularity/viability.   Due to limited cellularity we were only able to analyze the B cells.   There is no immunophenotypic evidence of B cell non-Hodgkin lymphoma.   Neoplastic cells, including large cells, may not survive specimen   processing. This sample may not be representative. Final interpretation   requires correlation with morphologic and clinical features.)    Imagin/15: IR RLQ 12 Fr pigtail drain placed in intraperitoneum, 60 cc fluid sent for labs and culture    CT abd/pelvis: moderate volume ascites stable with increased foci of intra-peritonal gas, thickened peritoneum c/w peritonitis, complex right RP gas collection stable to slightly decrease since , stable loculated bibasilar pleural effusions and bibasilar opacities, postsurgical changes of R hemicolectomy and several loops borderline dilated bowel    Paracentesis performed by IR.  8/10 CXRs:  Right PICC.  Increasing pulmonary edema.  Left retrocardiac and basilar opacities, atelectasis and/or infection.  Small right pleural effusion.  8/10 Abdm U/S:  Small to moderate volume complex ascites visualized, greatest in the lower quadrants, left greater than right. Right retroperitoneal air again noted.  Right-sided pleural  effusion.  8/9 CT-guided retroperitoneal abscess drainage performed by IR.  8/9 Chest / abdm CT:  Unchanged right retroperitoneal air with new foci of free intraperitoneal air in the right upper quadrant. No extravasation of oral contrast identified.  No significant change in large volume abdominal ascites, thickened peritoneum and diffuse bowel wall thickening concerning for peritonitis.  Small right and trace left pleural effusions with overlying atelectasis and patchy consolidations. Overall, the consolidations are slightly increased since the prior exam. Differential includes atelectasis or infection.  Secondary signs of portal hypertension similar to the prior exam.  Unchanged mild bilateral renal pelvocaliectasis.  8/8 CXR:  Increased bibasilar streaky opacities, compatible with atelectasis and/or infection.  Probable small bilateral pleural effusions.  Persistent low lung volumes.  8/5 Ultrasound-guided paracentesis performed by IR:  250 ccyellow-brown fluid sent for analysis.  8/4 Abdm CT:  Heterogenous area of right-sided retroperitoneal gas has mildly decreased in size (versus 7/25/17 scan).  Moderate to severe ascites with diffuse peritoneal enhancement.  This may represent diffuse infection, for example bacterial peritonitis.  Mild residual foci of free air in the abdomen is likely postsurgical.  Diffuse small bowel and colonic wall thickening, similar to prior, likely reactive to ascites/peritonitis.  Findings of portal hypertension including splenomegaly, ascites, esophageal and upper abdominal varices.  Small right pleural effusion with associated atelectasis. Improved left pleural effusion and basilar consolidation.  8/4 CXR:  Decreased lung volumes with improving perihilar and bibasilar opacities, which may represent atelectasis, pulmonary edema, or infection.  8/2 CXR: Decreased lung volumes with mildly increased perihilar and bibasilar opacities, which may represent pulmonary edema, atelectasis,  or infection.  Small right pleural effusion.  8/2 BLE U/S:  Right leg: Normal RONAL. Mildly Abnormal TBI, suggestive of small vessel disease. Patent right lower extremity arteries without evidence of hemodynamically significant stenosis.  Left leg: Normal RONAL. Abnormal TBI, suggestive of small vessel disease. Patent left lower extremity arteries without evidence of hemodynamically significant stenosis.  7/31 CXR:  Improving perihilar and bibasilar opacities likely represent infection/aspiration, atelectasis, or pulmonary edema.  Moderate opacities bilaterally persist.  Stable small to moderate pleural effusions bilaterally.  7/28 CXR:  Interval removal of the endotracheal tube.  Increased perihilar opacities, worsening infection versus pulmonary edema.  Increased bilateral moderate pleural effusions with overlying atelectasis/consolidation.  7/25 Chest / abdm CT:  New large heterogeneous area of right-sided retroperitoneal gas which is highly concerning for an infectious process.  Given the history of prior neutropenic colitis and biopsies, there could also be a component of stool from a ruptured viscus, despite the lack of  surrounding bowel loops and no extraluminal oral contrast. Surgical consultation is recommended. Bibasilar atelectasis versus consolidation with small bilateral pleural effusions.  Extensive mesenteric and soft tissue edema with large volume ascites. Numerous mesenteric and retroperitoneal lymph nodes which are presumably reactive.  Postsurgical changes of liver transplant.  7/20 Brain MRI:  Significant image degradation due to motion artifact, with no definite acute intracranial pathology. No abnormal contrast enhancing intracranial lesions.  Mucosal thickening in the sphenoid and maxillary sinuses and left greater than right mastoid fluid are nonspecific in the setting of recent intubation.  7/13 Chest / abdm CT:  Improved moderate right-sided pleural effusion and resolution of left-sided  pleural effusion. There remain large areas of atelectasis/consolidation in both lungs, including in the left lower lobe despite resolution of left-sided pleural effusion. Superimposed infectious process cannot be excluded.  Moderate-large amount of ascites increased from 7/8/2017, which makes it difficult to evaluate for the presence or absence of inflammatory change or infectious process in the abdomen or pelvis. No intra-abdominal abscess.  Stable small presumed hematoma within the ventral abdominal wall fat.  Splenomegaly.

## 2017-08-15 NOTE — PROCEDURES
Interventional Radiology Brief Post Procedure Note    Procedure: RLQ Peritoneal drainage catheter    Proceduralist: Basilio Dominguez MD    Assistant: Karl Ashby MD and None    Time Out: Prior to the start of the procedure and with procedural staff participation, I verbally confirmed the patient s identity using two indicators, relevant allergies, that the procedure was appropriate and matched the consent or emergent situation, and that the correct equipment/implants were available. Immediately prior to starting the procedure I conducted the Time Out with the procedural staff and re-confirmed the patient s name, procedure, and site/side. (The Joint Commission universal protocol was followed.)  Yes        Sedation: IR Nurse Monitored Care   Post Procedure Summary:  Prior to the start of the procedure and with procedural staff participation, I verbally confirmed the patient s identity using two indicators, relevant allergies, that the procedure was appropriate and matched the consent or emergent situation, and that the correct equipment/implants were available. Immediately prior to starting the procedure I conducted the Time Out with the procedural staff and re-confirmed the patient s name, procedure, and site/side. (The Joint Commission universal protocol was followed.)  Yes       Sedatives: Fentanyl    Vital signs, airway and pulse oximetry were monitored and remained stable throughout the procedure and sedation was maintained until the procedure was complete.  The patient was monitored by staff until sedation discharge criteria were met.    Patient tolerance: Patient tolerated the procedure well with no immediate complications.    Time of sedation in minutes: 30 Minutes minutes from beginning to end of physician one to one monitoring.          Findings: 12F locking pigtail skater placed RLQ    Estimated Blood Loss: Minimal    Fluoroscopy Time:  minute(s)    SPECIMENS: 60ml Fluid and/or tissue for laboratory  analysis and culture    Complications: 1. None     Condition: Stable    Plan: Follow up per primary team.    Comments: See dictated procedure note for full details.    Karl Ashby MD

## 2017-08-15 NOTE — PLAN OF CARE
Problem: Goal Outcome Summary  Goal: Goal Outcome Summary  Outcome: No Change  VSS on RA. Blood sugars done hourly per insulin gtt protocol. Blood sugars ranging from 125-165. Currently at 4units/hr on algorithm 4. Denies pain or nausea. Tolerating NPO status. Tube feeds infusing at goal rate of 65mL/hr via NJ tube. IR to call 4hr prior to procedure today to shut off tube feeds. PICC infusing NS at 10mL/hr. Tacrolimus at 5.5mL/hr and insulin gtt. Right flank drain had 80mL (100mL of output minus 20mL of irrigant) of serous output. Crowe patent draining adequate amounts of clear, hardik urine. Ileostomy draining large amount of brown liquid stools. Mucous fistula covered with dressing, no drainage noted. Abdominal incision well approximated. Confusion continues. Thinks the nurses are his daughters. Yelling out for his wife Danica. Plan to advance flank drain today in IR. VPM in place.

## 2017-08-15 NOTE — PROGRESS NOTES
Colorectal Surgery Progress Note  POD#42      Subjective:  Pt denies pain.      Vitals:  Vitals:    08/14/17 2000 08/15/17 0003 08/15/17 0346 08/15/17 0734   BP: (!) 172/92 170/88 142/85 170/89   BP Location: Left arm Left arm Left arm    Pulse:  90     Resp: 20 20 20 20   Temp: 99.2  F (37.3  C) 97.4  F (36.3  C) 98.7  F (37.1  C) 98.6  F (37  C)   TempSrc: Oral Oral Oral    SpO2: 97% 94% 95% 94%   Weight:       Height:         I/O:  I/O last 3 completed shifts:  In: 4811.75 [P.O.:320; I.V.:1726.75; Other:40; NG/GT:750; IV Piggyback:1000]  Out: 3485 [Urine:1675; Drains:210; Stool:1600]    Physical Exam:  Gen: Awake, alert.  NAD.    Pulm: Non-labored breathing  Abd: Soft, protuberant with ascites, non- tender, no guarding/rebound   Incision C/D/I with staples in place.  Mucus fistula at superior aspect of incision.  Covered.    Stoma pink and viable with stool and gas in bag  Ext:  Warm and well-perfused    BMP  Recent Labs  Lab 08/15/17  0639 08/14/17  0621 08/13/17  0631 08/12/17  0630    143 142 143   POTASSIUM 4.3 4.4 4.5 4.4   CHLORIDE 115* 114* 116* 115*   CO2 18* 16* 17* 22   BUN 75* 71* 66* 68*   CR 1.87* 1.92* 1.94* 1.82*   * 133* 113* 147*   MAG 1.9 2.1 2.1 2.1   PHOS 4.0 5.1* 5.6* 5.7*     CBC  Recent Labs  Lab 08/15/17  0639 08/14/17  0621 08/13/17  0631 08/12/17  0630   WBC 2.7* 3.4* 3.3* 2.8*   HGB 7.4* 8.1* 8.4* 7.9*   HCT 23.3* 25.2* 26.4* 24.5*   * 129* 156 132*         ASSESSMENT: This is a 51 year old male s/p liver transplant 3/2017. S/p ex lap with wash out on 7/4/2017 and then subsequent ex lap, YA, right hemicolectomy, end ileostomy, mucous fistula, partial omentectomy on 7/26/2017.    - ileo outputs improved.  Continue imodium 4mg QID, lomotil QID, fiber BID.  Goal ileo output is ~1200ml/day.  Can further uptitrate lomotil and fiber as needed.  If maxed on imodium, lomotil, fiber, then next could consider tincture.   - call/page for questions    Iqra Acosta PA-C    Colon and Rectal Surgery  [id number]     Patient was seen and discussed with Dr. Lynch.

## 2017-08-15 NOTE — PLAN OF CARE
Problem: Individualization  Goal: Patient Preferences  Outcome: No Change  Hypertensive, OVSS. BG= 123-180 on insulin gtt. Pt went down for drain placement in IR. Drain is in right abdomen to be flushed with 10ccNS every shift. Old drain in right abdomen is still present and continues to be flushed with 20ccNS every shift. Crowe removed as urine was leaking out around the catheter. Pt has since asked for urinal once. TF was shut off this morning prior to drain placement and D10 was started at 65cc/hr. When pt arrived back to 7A the D 10 was stopped and TF restarted. Writer was then informed that TF's need to be stopped for CT this afternoon and was told by Ada Driver that the D10 did not need to be started and to watch BG as pt is on insulin gtt. All dressings have been changed. Ostomy pouch is intact and put out 600cc this shift. PICC ports are patent with good blood return. Caps were changed along with all tubing and IV bags. Tac gtt needs to be changed when new bag arrives up from pharmacy. Continue to be confused and VPM is in use. Continue to monitor and notify MD as needed.

## 2017-08-15 NOTE — PROGRESS NOTES
Interventional Radiology Intra-procedural Nursing Note    Patient Name: Camacho Bhagat  Medical Record Number: [id number]  Today's Date: August 15, 2017    Start Time: 1125  End of procedure time: 1150  Procedure: RLQ Abdominal Drain Placement  Report given to: WOJCIECH John 7A  Time pt departs: 1205    Attending MD at bedside during Timeout: Dr. Basilio Dominguez    Proceduralist: Dr. Karl Ashby    Other Notes:     Pt to IR Holding Room #7 from Unit 7A. Consent confirmed with patient; questions addressed.  Pt positioned supine and prepped on inpatient bed. 12 fr x 35 cm Skater Abdominal drain placed to RLQ, and ascites fluid collected for lab analysis and culture.  Connected drain to marty bag and in 5 minutes - 500 mL yellow, clear drainage had collected in drainage bag.  Pt tolerated procedure without apparent incident. Pt denies pain post-procedure.    Sedation Time = 25 minutes  Fentanyl: 100 mcg  Versed: 1 mg    Bryn Barry RN

## 2017-08-15 NOTE — PROGRESS NOTES
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 Admitted 7/4/17 from [facility] for evaluation and management of sepsis secondary to colitis, taken to OR for initial exploratory laparotomy with findings of typhlitis in the right colon, wound left open with wound vac in place for reexploration and interval closure on 7/5/2017. Concern for CMV colitis with low count CMV viremia/GI PTLD and subsequently underwent colonoscopy on 7/21, random biopsies obtained.  On 7/25/2017 patient became septic with abdominal compartment syndrome. CT noted new large heterogeneous right sided retroperitoneal gas and air tracking along duodenum and underwent a exploratory laparotomy, right angelique-colectomy, end ileostomy, mucus fistula, partial omentectomy on 7/26 by colorectal surgery.  Antibiotics discontinued 8/3. 8/6 became septic with increased abd pain, confusion. 8/8 right retroperitoneal drain placed. 8/11 para with wg=1193. Additional retroperitoneal drain placed 8/15.    Graft Function: Good function. LFTs acceptable (checking every Monday, Thursday).   Immunosuppression Management:   CellCept discontinued (6/27/17) due to neutropenia.   Prograf goal ~5-6 due to sepsis.  Unable to obtain detectable level with suspension.  Level 6.6 continue FK gtt at 110/hr.   Cardiorespiratory: Stable respiratory status, NLB on RA. Assist with aggressive pulmonary toilet. OOB to chair and ambulate as tolerated.   HTN:-170, Restart amlodipine 5mg suspension. Pt on Florinef.   Hematology: Pancytopenia present on admission, persists. Continue to hold MMF.   Primary CMV infection  Anemia- Hemoglobin stable 7-8. Last blood transfusion on 7/26  Leukopenia-stable WBC 2.7.   GI:   -Neutropenic colitis on admission. Colonoscopy 7/21. Biopsy: resolving injury secondary to possible drug induced vs infectious.   -Bowel perforation: 7/25  CT scan abd/pelvis-new large heterogeneous right sided retroperitoneal gas and air tracking along duodenum.  No contrast extravasation.  No intraperitoneal air noted. Colorectal surgery performed Ex lap, right angelique-colectomy, end ileostomy, mucus fistula, partial omentectomy on 7/26. Findings no neida perforation, phlegmon/inflammation right colon. Colon pathology suggested colon perforation, negative for malignancy; CMV stain positive.    -Retroperitoneal abscess/ascites peritonitis: CT A/P 8/9 showed a persistent gas/fluid collection RLQ, peritonitis, some free air but no evidence of contrast extravasation. 8/11: Diag. Para WBC 5,038. Repeat CT scan abd/pelvis 8/14 - Complex gas-containing fluid collection in the right retroperitoneum decreased slightly in size. Moderate ascites, with intraperitoneal gas, peritonitis noted.  IR placed additional drain to abdomen with cloudy/turbid fluid. Plan for imaging to assess GI tract.   -Diet:  CLD-NPO and hold TF per [place] for procedures to assess GI tract.    -High output  ileostomy:  Goal ileostomy output ~ 1000 cc in 24 hrs.  Output 1.7L yesterday.  Loperamide 4mg QID/ Lomotil QID/Fiber BID.  Fluid/Electrolytes/Renal:   -Dehydration: improved after IVF. Continue with MIVF while NPO   -EJ: on admission,d/t ATN sec to sepsis. SCR remains elevated but stable at 1.8. Nephrology following.  -Hypernatremia: resolved with free water.    -Hyperkalemia: continue florinef BID.   Avoid kayexalate due to recent bowel perforation.    Endocrine: DM type 2. Endocrine consulting for glycemic management.  Continue insulin gtt, will likely transition tomorrow after drain placement.  Infectious disease: Afebrile.  WBC 2.7. ID following.    -Retroperitoneal abscess: CT C/A/P 8/9 showed a persistent gas/fluid collection RLQ, peritonitis, some free air but no evidence of contrast extravasation.  IR placed RP drain, growing clostridium- on zosyn, linezolid. Stop linezolid today. Continue zosyn  indefinately at this time.  -CMV viremia: Primary CMV infection.  (CMV R-D+) CMV colitis per pathology, peak serum 7/15 4225 IU/ml.   IV Ganciclovir (started 7/20) -Continue high maintenance dosing. Follow CMV PCR weekly, now <137 x 2 weeks.  Next due 8/17.  -EBV viremia:  Peak serum 406,697 copies/ml on 7/18.  Down to 6864 as of 8/1.  Check weekly, pending today.  -R/o SBP or abscess:  8/11 paracentesis.  WBC 5,038, cx negative.  Repeat today-fluid cloudy/purulent with cc=15,680.  Resolved issues:  -Severe sepsis: On admission, secondary to right colon phlegmon. Meropenem/daptomycin/micafungin tx x 10 days completed on 8/3. S/p right angelique-colectomy.  Path: CMV stain +, perforation of colon noted.  7/5 Fluid culture + staph capitis broth only (contamination).  Neuro: Toxic metabolic encephalopathy secondary to infection, prolonged hospital stay. Improving,virtual sitter present.   Vascular:  Evidence of microvascular injury in digits (toes) this is most likely due to injury while patient was on pressor therapy with sepsis.  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   Activity: Deconditioned due to prolong hospital course. Daily PT/OT, encourage pt to be OOB to chair. Encourage pulmonary toilet.   Prophylaxis: DVT-restart Heparin SQ  Disposition: 7A.     Medical Decision Making: Medium    PILLO/Fellow/Resident Provider:  Ada Driver PA-C    Faculty: Tip Zhang M.D.      __________________________________________________________________  Transplant History:.  3/4/2017 DD OLT with Dr. Tran (Liver), Postoperative day: 164     Interval History:   Overnight events: No acute events overnight. CT with improving fluid collection with drainage.  New moderate ascites noted.Tolerating TF and CLD. High output from ileostomy after restarting TF with improvement following increase to antidiarrheals. Virtual sitter present due to patient pulling at lines.     ROS:   A 10-point review of  systems was negative except as noted above.    Meds:    amLODIPine  5 mg Oral Daily     sodium chloride (PF)  10 mL Irrigation Q8H     fiber modular  1 packet Per Feeding Tube BID     sodium chloride (PF)  20 mL Irrigation Q6H     diphenoxylate-atropine  5 mL Oral 4x Daily     saccharomyces boulardii  250 mg Oral BID     sodium bicarbonate  1,300 mg Per NG tube TID     ascorbic acid  250 mg Oral Daily     sulfamethoxazole-trimethoprim  10 mL Oral Daily     ganciclovir (CYTOVENE) intermittent infusion  7.5 mg/kg Intravenous Q24H     miconazole with skin protectant   Topical BID     piperacillin-tazobactam  3.375 g Intravenous Q6H     fludrocortisone  0.1 mg Oral BID     multivitamin CF formula  5 mL Per Feeding Tube Daily     zinc sulfate  220 mg Per Feeding Tube Daily     sodium chloride (PF)  3 mL Intracatheter Q8H     loperamide  4 mg Oral or Feeding Tube 4x Daily     insulin aspart   Subcutaneous TID w/meals     protein modular  1 packet Per Feeding Tube TID     aspirin  80 mg Oral Daily     pantoprazole  40 mg Oral or Feeding Tube Daily     sodium chloride (PF)  3 mL Intracatheter Q8H       Physical Exam:     Admit Weight: 94.2 kg (207 lb 11.2 oz) (SCALE 2)    Current vitals:   /86  Pulse 90  Temp 98.6  F (37  C)  Resp 18  Ht 1.829 m (6')  Wt 89.4 kg (197 lb)  SpO2 95%  BMI 26.72 kg/m2    Vital sign ranges:    Temp:  [97.4  F (36.3  C)-99.2  F (37.3  C)] 98.6  F (37  C)  Pulse:  [89-98] 90  Heart Rate:  [] 89  Resp:  [18-22] 18  BP: (139-172)/(80-93) 159/86  SpO2:  [94 %-98 %] 95 %  Patient Vitals for the past 24 hrs:   BP Temp Temp src Pulse Heart Rate Resp SpO2   08/15/17 1333 159/86 - - - 89 - 95 %   08/15/17 1300 (!) 139/93 - - - 88 - -   08/15/17 1245 148/85 - - - 88 - -   08/15/17 1236 (!) 139/91 - - 90 - 18 96 %   08/15/17 1145 156/82 - - 89 89 18 97 %   08/15/17 1130 165/83 - - - 91 20 97 %   08/15/17 1125 158/87 - - - 90 20 98 %   08/15/17 1115 151/80 - - - 87 20 94 %   08/15/17 1103  (!) 152/91 - - - 90 22 95 %   08/15/17 0734 170/89 98.6  F (37  C) - - 94 20 94 %   08/15/17 0346 142/85 98.7  F (37.1  C) Oral - 100 20 95 %   08/15/17 0003 170/88 97.4  F (36.3  C) Oral 90 - 20 94 %   08/14/17 2000 (!) 172/92 99.2  F (37.3  C) Oral - 90 20 97 %   08/14/17 1600 (!) 168/91 98.8  F (37.1  C) Oral 98 98 20 97 %     General Appearance: NAD  Skin: normal, warm, dry  Heart: sinus tach 100-110  Lungs: productive cough, diminished bilateral  Abdomen: soft, rounded, more tender. Increased distention from prior day. Ileostomy with brown output. Mucus fistula covered. Midline incision, c/d/i.   : Crowe present.  Extremities: No edema.  Necrotic blackened areas on right 1st & 2nd toes and left 2nd toe.  Neurologic: A&O x 2, waxes and wanes. Nonsensical speech at times.       Data:   CMP    Recent Labs  Lab 08/15/17  0639 08/14/17  0621  08/11/17  1310  08/10/17  0704    143  < >  --   < > 140   POTASSIUM 4.3 4.4  < >  --   < > 5.5*   CHLORIDE 115* 114*  < >  --   < > 112*   CO2 18* 16*  < >  --   < > 18*   * 133*  < >  --   < > 121*   BUN 75* 71*  < >  --   < > 61*   CR 1.87* 1.92*  < >  --   < > 1.70*   GFRESTIMATED 38* 37*  < >  --   < > 42*   GFRESTBLACK 46* 45*  < >  --   < > 51*   JACOB 8.1* 8.4*  < >  --   < > 8.5   MAG 1.9 2.1  < >  --   < > 1.9   PHOS 4.0 5.1*  < >  --   < > 4.6*   ALBUMIN  --  1.8*  --   --   --  1.9*   BILITOTAL  --  0.7  --   --   --  2.2*   ALKPHOS  --  271*  --   --   --  136   AST  --  24  --   --   --  36   ALT  --  17  --   --   --  26   FAMY  --   --   --  19  --   --    < > = values in this interval not displayed.  CBC    Recent Labs  Lab 08/15/17  0639 08/14/17  0621   HGB 7.4* 8.1*   WBC 2.7* 3.4*   * 129*     COAGS  No lab results found in last 7 days.    Invalid input(s): XA   Urinalysis  Recent Labs   Lab Test  08/10/17   1752  08/08/17   1600   06/14/17   1508   04/11/16   1345   COLOR  Yellow  Yellow   < >   --    < >  Yellow   APPEARANCE  Slightly  "Cloudy  Slightly Cloudy   < >   --    < >  Clear   URINEGLC  Negative  Negative   < >   --    < >  30*   URINEBILI  Small   This is an unconfirmed screening test result. A positive result may be false.  *  Negative   < >   --    < >  Negative   URINEKETONE  Negative  Negative   < >   --    < >  Negative   SG  1.012  1.010   < >   --    < >  1.016   UBLD  Negative  Negative   < >   --    < >  Small*   URINEPH  5.0  5.0   < >   --    < >  5.0   PROTEIN  30*  30*   < >   --    < >  30*   NITRITE  Negative  Negative   < >   --    < >  Negative   LEUKEST  Negative  Negative   < >   --    < >  Negative   RBCU  0  3*   < >   --    < >  1   WBCU  10*  7*   < >   --    < >  1   UTPG   --    --    --   1.55*   --   0.41*    < > = values in this interval not displayed.          7/21/2017   SPECIMEN(S):   A: Colon biopsy, ascending   B: Colon biopsy, descending     FINAL DIAGNOSIS:   A. COLON BIOPSY, ASCENDING:   - Colonic mucosa with no significant histologic abnormality   - Negative for intraepithelial lymphocytosis or deposition of   subepithelial thick collagenous band     B. COLON BIOPSY, DESCENDING:   - Crypt architecture distortion, consistent with healed prior injury   - Negative for active inflammation or dysplasia   - Negative for intraepithelial lymphocytosis or deposition of   subepithelial thick collagenous band   - Please, see comment     COMMENT:   Specimen B, \"descending colon\" features lamina propria edema, slight   variation in crypt sizes and shapes and occasional crypt drop-out.  This   may indicate a resolving injury which could be drug-induced or   infectious.       "

## 2017-08-15 NOTE — PROGRESS NOTES
Interventional Radiology Pre-Procedure Sedation Assessment   Time of Assessment: 11:14 AM    Expected Level: Moderate Sedation    Indication: Sedation is required for the following type of Procedure: Drain    Sedation and procedural consent: Obtained prior.    PO Intake: Appropriately NPO for procedure    ASA Class: Class 3 - SEVERE SYSTEMIC DISEASE, DEFINITE FUNCTIONAL LIMITATIONS.    Mallampati: Grade 3:  Soft palate visible, posterior pharyngeal wall not visible    Lungs: Lungs Clear with good breath sounds bilaterally    Heart: Normal heart sounds and rate    History and physical reviewed and no updates needed. I have reviewed the lab findings, diagnostic data, medications, and the plan for sedation. I have determined this patient to be an appropriate candidate for the planned sedation and procedure and have reassessed the patient IMMEDIATELY PRIOR to sedation and procedure.    Karl Ashby MD

## 2017-08-15 NOTE — CONSULTS
Patient is on IR schedule 8/15/2017 for a US Drain placement   Labs WNL for procedure.    Orders for NPO, scrubs and antibiotics have been entered.  Consent will be done prior to procedure.     [place] [phone] for estimated time of procedure.       Halie Sosa IR RPA  [phone] Call pager  [phone] pager

## 2017-08-15 NOTE — PROGRESS NOTES
CLINICAL NUTRITION SERVICES - REASSESSMENT NOTE     Nutrition Prescription    RECOMMENDATIONS FOR MDs/PROVIDERS TO ORDER:  If patient unable to resume TF - recommend TPN to avoid further nutritional depletion (weight loss of 8.4 kg/9% since admission).      Malnutrition Status:    Severe malnutrition in the context of acute on chronic illness    Recommendations already ordered by Registered Dietitian (RD):  Discontinued TF at the request of the provider d/t uncertain abdominal status (and procedures).  Plan for re-ordering when appropriate.     Future/Additional Recommendations:  If hyperkalemia no longer a concern, patient could benefit from semi-elemental TF formula (Peptamen 1.5 @ 80 ml/hr + 1 pkt Prosource TF).        EVALUATION OF THE PROGRESS TOWARD GOALS   Diet: NPO   Nutrition Support: Nepro @ 65 ml/hr which provides 2808 kcal (32 kcal/kg), 126 grams protein (1.4g/kg), 1139 ml free water, 251 grams CHO and 20 grams fiber  -H2O Flushes: 50 ml q 1 hour  -Prosource 1 packet TID (3 packets = 120 kcals and 33 g PRO)  -Nutrisource Fiber 1 packet BID (6 g soluble fiber)    Has received 7 day TF average of ~1602 kcal (18 kcal/kg, 56% needs), 99 grams protein (1.1g/kg, 74% needs) daily.       NEW FINDINGS     [facility] nurse note on (8/3 and again 8/14) indicates sacral wound (DTPI) and ear lobe wound (Stage 2) -> Receiving KIP Plus 5 ml, zinc 220 mg, vit C 250 mg daily through 8/19.     Ileostomy output: continues to be significant with TF running (2650 ml out yesterday with TF at goal rate the majority of the day).  Currently on Florastor BID, nutrisource fiber BID, lomotil 5 mg QID, imodium 4 mg QID.    Plan to have further testing (abd CT scan/contrast studies) today - plan for TF will depend on these tests    Significant weight changes over the last 5-6 weeks.  94.2 kg (7/4) -> 85.8 kg (8/13).  Loss of 8.4 kg (9%).     MALNUTRITION  % Intake: < 75% for > 7 days (non-severe)  % Weight Loss: > 5% in 1 month  (severe)  Subcutaneous Fat Loss: Facial region and Thoracic/intercostal:  At least Mild - per previous  Muscle Loss: Temporal, Facial & jaw region, Thoracic region (clavicle, acromium bone, deltoid, trapezius, pectoral) and Upper leg (quadricep, hamstring):  Mild to Moderate - per previous  Fluid Accumulation/Edema: Does not meet criteria  Malnutrition Diagnosis: Severe malnutrition in the context of acute on chronic illness    Previous Goals   Total avg nutritional intake to meet a minimum of 25 kcal/kg and 1.5 g PRO/kg daily (per dosing wt 92 kg).  Evaluation: Not met    Previous Nutrition Diagnosis  Altered GI status  Evaluation: No change    CURRENT NUTRITION DIAGNOSIS  Inadequate enteral nutrition infusion related to TF being held frequently d/t altered GI status AEB patient received ~56% kcal needs, 74% pro needs daily over the last 7 days.     INTERVENTIONS  Implementation  Discontinued patient's TF orders per discussion with liver transplant team.      Goals  Total avg nutritional intake to meet a minimum of 25 kcal/kg and 1.5 g PRO/kg daily (per dosing wt 92 kg).    Monitoring/Evaluation  Progress toward goals will be monitored and evaluated per protocol.      Janice Salomon MS, RD, LD  Pager [phone]

## 2017-08-15 NOTE — PLAN OF CARE
Problem: Goal Outcome Summary  Goal: Goal Outcome Summary  PT 7A: Pt continues to require additional time for transfers. Requires Min-Mod A of 2 for supine <> sit with much encouragement and cueing to initiate rolling. Pt demonstrates poor log rolling despite cues. Completes sit <> stand with Min A today, ambulates x 15' with FWW and CGA + assist for line management. Pt stands ~ 5 minutes today, sits EOB ~ 10 minutes with CGA. VSS.     Recommend TCU once medically appropriate to improve functional mobility, strength, balance and activity tolerance.

## 2017-08-15 NOTE — PROGRESS NOTES
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       Assessment & Recommendations:      1. Non oliguric EJ - Improved following IVF yesterday. Creat 1.8 today. Peak creat 1.94 on 8/13/17. Creat was 0.8-0.9 from 7/31 to 8/6. Had Contrast on 8/4, drain placement on 8/9, restarted Prograf on 8/10  Etiology infection, contrast, resumption of Prograf.   Baseline creat 1.4-1.7 on TAC   - Almost back to previous baseline while on CNI   - Continue to monitor for recovery with daily chemistry labs   - Avoid hypotension/Nephrotoxins   - Renal dose medications     2. Volume status - Euvolemic. Creat has declined. No edema, hypoxia. Weight is likely inaccurate given that it is 4 kg above weight day prior!. -170/. UO 1700 cc, stool 1250 cc, drain 160 cc.    - Stool output goal is 1200 cc /day per Surgery.     - Continue daily standing weights   - Continue I/O    3. HTN - Uncontrolled. -170/. Previously on Amlodipine and Clonidine.    - Agree with restarting Amlodipine 5 mg qd    4. Electrolytes - No acute concerns. K 4.3, Na 140   - Continue Florinef 0.1 mg bid    5. Acid base - Metabolic acidosis with EJ/Diarrhea. Bicarb 18. Currently on Bicarb 1300 mg TID    - Primary team working on controlling Diarrhea   - Continue current dose of Bicarb    6. BMD - Ionized Ca 4.6, Phos 4.0.     7. Antimicrobials - Ganciclovir, Zosyn, Bactrim. WBC 2.7. Afebrile. Abdominal drain placement 8/9/17. Imaging study yesterday shows decrease in size of fluid collection.     8. Liver transplant, IS - Prograf gtt. Level 6.6    9. Nutrition - On TF per nepro and ADAT. Albumin 1.8, on Protein pack TID.      Recommendations were communicated to primary team directly     Seen  and discussed with Dr. Dena Cazares, NP   [phone]     Interval History :      Creat improved   Remains non oliguric     Review of Systems:      I reviewed the following systems:  GI: On TF w/Nepro. Advancing diet, on Clear liquids   Neuro:  no confusion  Constitutional:  no fever or chills  CV: denies dyspnea, CP or edema.    : Has vazquez    Physical Exam:   I/O last 3 completed shifts:  In: 4811.75 [P.O.:320; I.V.:1726.75; Other:40; NG/GT:750; IV Piggyback:1000]  Out: 3485 [Urine:1675; Drains:210; Stool:1600]   /86  Pulse 90  Temp 98.6  F (37  C)  Resp 18  Ht 1.829 m (6')  Wt 89.4 kg (197 lb)  SpO2 95%  BMI 26.72 kg/m2     GENERAL APPEARANCE: alert, interactive  EYES:  no scleral icterus, pupils equal  HENT: FT present  PULM: lungs CTA. Breathing is non labored. Not on supplemental oxygen  CV: tachy      -edema - none   GI: soft, NT. Staple line intact. Colostomy with large amt of liquid stool. Right sided drain with pink drainage  : Vazquez present with hardik urine  NEURO:  alert     Labs:   All labs reviewed by me  Electrolytes/Renal -   Recent Labs   Lab Test  08/15/17   0639 08/14/17   0621 08/13/17   0631   NA  140  143  142   POTASSIUM  4.3  4.4  4.5   CHLORIDE  115*  114*  116*   CO2  18*  16*  17*   BUN  75*  71*  66*   CR  1.87*  1.92*  1.94*   GLC  112*  133*  113*   JACOB  8.1*  8.4*  8.4*   MAG  1.9  2.1  2.1   PHOS  4.0  5.1*  5.6*       CBC -   Recent Labs   Lab Test  08/15/17   0639  08/14/17   0621  08/13/17   0631   WBC  2.7*  3.4*  3.3*   HGB  7.4*  8.1*  8.4*   PLT  118*  129*  156       LFTs -   Recent Labs   Lab Test  08/14/17   0621  08/10/17   0704  08/08/17   0600   ALKPHOS  271*  136  172*   BILITOTAL  0.7  2.2*  0.5   ALT  17  26  25   AST  24  36  28   PROTTOTAL  6.1*  6.0*  6.4*   ALBUMIN  1.8*  1.9*  2.1*       Iron Panel -   Recent Labs   Lab Test  02/08/16   1144   IRON  93   IRONSAT  41         Imaging:  EXAMINATION: CT ABDOMEN PELVIS W/O CONTRAST,  8/14/2017 11:50 AM     TECHNIQUE:  Helical CT images from the lung bases through the  symphysis pubis were obtained without IV contrast.     COMPARISON: 8/9/2017     HISTORY: s/p liver transplant, and right hemicolectomy with  ileo-stoma, retro-peritoneal collection; please evaluate     FINDINGS:     Devices: Feeding tube tip is in the proximal jejunum. Stable position  of IVC filter. Right-sided drainage catheter has a formed pigtail in  the right retroperitoneum. It has been retracted compared with  8/9/2017 at which time it crossed midline.     Abdomen and pelvis: Stable postsurgical changes of liver  transplantation. Splenomegaly. Stable mild hydronephrosis.  Postsurgical changes of right hemicolectomy with a right lower  quadrant ileostomy and mid abdominal colon mucous fistula. Several  loops of borderline dilated small bowel; however, oral contrast  extends to the ileostomy site. Stable mesenteric edema and ascites now  with increased, scattered foci of gas (series 2, image 71 anteriorly  in the right and series 2, image 126-160).      Complex gas-containing fluid collection in the right retroperitoneum  extending toward the midline has decreased slightly in size measuring  12 x 4cm, series 2 image 115, previously 14 x 5 cm on 8/9/2017. As  above right lateral approach pigtail catheter coils within this  collection.     Normal appearance of the right kidney. Unchanged mild left  hydronephrosis. Negative adrenal glands. Pancreas is not dilated.  Significant vascular calcifications. Crowe catheter in the urinary  bladder.     Lung bases: Stable loculated right and relatively simple appearing  left pleural effusion at the right base with bibasilar consolidative  opacities.     Bones and soft tissues: No aggressive osseous lesions. Dependent  predominant subcutaneous edema is unchanged compared with 8/9/2017.         IMPRESSION:   1a. Moderate volume ascites, stable with increased foci of  intraperitoneal gas,  potentially relating to catheter flushes,  redistribution of retroperitoneal gas or infection though given recent  surgical procedures on the presence of two ostomies, perforation or  dehiscence is also a consideration.  1b. Thickened peritoneum concerning for peritonitis.  2. Complex right retroperitoneal gas containing collection is stable  to slightly decreased in size compared with 8/9/2017. Pigtail catheter  is in good position.   3. Stable loculated bibasilar pleural effusions and bibasilar  consolidative opacities which could represent infection or  atelectasis.  4. Postsurgical changes of right hemicolectomy. Several loops of  borderline dilated small bowel; however, oral contrast reaches the  ileostomy.  5. Stable left hydronephrosis.    Current Medications:    amLODIPine  5 mg Oral Daily     sodium chloride (PF)  10 mL Irrigation Q8H     fiber modular  1 packet Per Feeding Tube BID     sodium chloride (PF)  20 mL Irrigation Q6H     diphenoxylate-atropine  5 mL Oral 4x Daily     saccharomyces boulardii  250 mg Oral BID     sodium bicarbonate  1,300 mg Per NG tube TID     ascorbic acid  250 mg Oral Daily     sulfamethoxazole-trimethoprim  10 mL Oral Daily     ganciclovir (CYTOVENE) intermittent infusion  7.5 mg/kg Intravenous Q24H     miconazole with skin protectant   Topical BID     piperacillin-tazobactam  3.375 g Intravenous Q6H     fludrocortisone  0.1 mg Oral BID     multivitamin CF formula  5 mL Per Feeding Tube Daily     zinc sulfate  220 mg Per Feeding Tube Daily     loperamide  4 mg Oral or Feeding Tube 4x Daily     insulin aspart   Subcutaneous TID w/meals     protein modular  1 packet Per Feeding Tube TID     aspirin  80 mg Oral Daily     pantoprazole  40 mg Oral or Feeding Tube Daily     sodium chloride (PF)  3 mL Intracatheter Q8H       lactated ringers 50 mL/hr at 08/15/17 1030     insulin (regular) 2 Units/hr (08/15/17 1424)     NaCl 10 mL/hr at 08/14/17 0101     tacrolimus (Prograf) infusion  ADULT 110 mcg/hr (08/14/17 1502)     IV fluid REPLACEMENT ONLY Stopped (08/15/17 1400)     Cinda Cazares, NP

## 2017-08-15 NOTE — PLAN OF CARE
Problem: Goal Outcome Summary  Goal: Goal Outcome Summary  Outcome: Improving  Afebrile. -170's/90's. Tylenol given x1 for abdominal discomfort. Pt remains disoriented to time, situation, place. Remains on insulin gtt; -200. Moderate output from Ileostomy, vazquez and drain; irrigated. Tac gtt infusing. TF running at goal of 65ml/hr. Mucous fistula and drain dressings changed. VPM. Pt is NPO at [facility] for IR drain procedure tomorrow. Continue POC.

## 2017-08-15 NOTE — PROGRESS NOTES
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  Now s/p ex lap with R hemicolectomy, end ileostomy, mucus fistula, partial omenectomy on 7/26.        Plan:  -does not require D10W for hypoglycemia prevention since insulin drip can be titrated down  -continue insulin drip.  Will reevaluate for subcut insulin tomorrow.  -meal aspart 1unit/7g CHO ordered for meals and snacks      Please notify diabetes team of changes planned for nutrition support schedule.     Outpatient diabetes follow up: Dr. Eckert at [facility]                   Interval History:   8/7/17 Pt upset about inpatient diabetes mgmt consult.  Transplant contacted pt's outpatient endocrinologist Dr. Eckert of [facility]-- no issue with inpatient team managing pt's glucose    Reviewed with transplant.  Pt awaiting small bowel follow through.  TF off for tests and uncertain when will resume pending completion of investigation into bowel health.    Note there are updated RD recs for nutrition support (was on Nepro).      Recent Labs  Lab 08/15/17  1504 08/15/17  1423 08/15/17  1238 08/15/17  1004 08/15/17  0902 08/15/17  0804  08/15/17  0639  08/14/17  0621  08/13/17  0631  08/12/17  0630  08/11/17  0650  08/10/17  0704   GLC  --   --   --   --   --   --   --  112*  --  133*  --  113*  --  147*  --  112*  --  121*   * 157* 123* 164* 180* 127*  < >  --   < >  --   < >  --   < >  --   < >  --   < >  --    < > = values in this interval not displayed.            Review of Systems:   See interval hx          Medications:   PTA taking Januvia and glargine versus glargine and aspart per  carb    Active Diet Order      NPO for Medical/Clinical Reasons Except for: Meds     Physical Exam:  Gen:     HEENT:   Resp:   Neuro:     /86  Pulse 90  Temp 98.6  F (37  C)  Resp 18  Ht 1.829 m (6')  Wt 89.4 kg (197 lb)  SpO2 95%  BMI 26.72 kg/m2           Data:     Lab Results   Component Value Date    A1C  07/06/2017     Canceled, Test credited   Below Assay Range  NOTIFIED LEONARD ONEILL AT 0538 ON 7/6/17 BY CHRISSY      A1C 9.4 02/16/2017    A1C 6.2 12/14/2016    A1C 6.1 10/27/2016    A1C 8.3 07/02/2012            Recent Labs   Lab Test  08/15/17   0639  08/14/17   0621   NA  140  143   POTASSIUM  4.3  4.4   CHLORIDE  115*  114*   CO2  18*  16*   ANIONGAP  8  13   GLC  112*  133*   BUN  75*  71*   CR  1.87*  1.92*   JACOB  8.1*  8.4*     CBC RESULTS:   Recent Labs   Lab Test  08/15/17   0639   WBC  2.7*   RBC  2.59*   HGB  7.4*   HCT  23.3*   MCV  90   MCH  28.6   MCHC  31.8   RDW  17.4*   PLT  118*     Liver Function Studies -   Recent Labs   Lab Test  08/14/17   0621   PROTTOTAL  6.1*   ALBUMIN  1.8*   BILITOTAL  0.7   ALKPHOS  271*   AST  24   ALT  17     Diabetes Management job code [id number]

## 2017-08-15 NOTE — PLAN OF CARE
Problem: Goal Outcome Summary  Goal: Goal Outcome Summary  SLP session cancelled: pt NPO and in the IR today. Will reschedule tx for tomorrow as appropriate.

## 2017-08-16 ENCOUNTER — APPOINTMENT (OUTPATIENT)
Dept: PHYSICAL THERAPY | Facility: CLINIC | Age: 52
End: 2017-08-16
Attending: TRANSPLANT SURGERY
Payer: COMMERCIAL

## 2017-08-16 ENCOUNTER — APPOINTMENT (OUTPATIENT)
Dept: OCCUPATIONAL THERAPY | Facility: CLINIC | Age: 52
End: 2017-08-16
Attending: TRANSPLANT SURGERY
Payer: COMMERCIAL

## 2017-08-16 LAB
AMYLASE FLD-CCNC: 10 U/L
ANION GAP SERPL CALCULATED.3IONS-SCNC: 9 MMOL/L (ref 3–14)
BACTERIA SPEC CULT: ABNORMAL
BACTERIA SPEC CULT: ABNORMAL
BACTERIA SPEC CULT: NO GROWTH
BLD PROD TYP BPU: NORMAL
BLD UNIT ID BPU: 0
BLOOD PRODUCT CODE: NORMAL
BPU ID: NORMAL
BUN SERPL-MCNC: 65 MG/DL (ref 7–30)
CALCIUM SERPL-MCNC: 8.3 MG/DL (ref 8.5–10.1)
CHLORIDE SERPL-SCNC: 117 MMOL/L (ref 94–109)
CO2 SERPL-SCNC: 16 MMOL/L (ref 20–32)
CREAT SERPL-MCNC: 1.5 MG/DL (ref 0.66–1.25)
CRP SERPL-MCNC: 140 MG/L (ref 0–8)
EBV DNA # SPEC NAA+PROBE: 753 {COPIES}/ML
EBV DNA SPEC NAA+PROBE-LOG#: 2.9 {LOG_COPIES}/ML
ERYTHROCYTE [DISTWIDTH] IN BLOOD BY AUTOMATED COUNT: 17.3 % (ref 10–15)
GFR SERPL CREATININE-BSD FRML MDRD: 49 ML/MIN/1.7M2
GLUCOSE BLDC GLUCOMTR-MCNC: 117 MG/DL (ref 70–99)
GLUCOSE BLDC GLUCOMTR-MCNC: 120 MG/DL (ref 70–99)
GLUCOSE BLDC GLUCOMTR-MCNC: 120 MG/DL (ref 70–99)
GLUCOSE BLDC GLUCOMTR-MCNC: 122 MG/DL (ref 70–99)
GLUCOSE BLDC GLUCOMTR-MCNC: 124 MG/DL (ref 70–99)
GLUCOSE BLDC GLUCOMTR-MCNC: 127 MG/DL (ref 70–99)
GLUCOSE BLDC GLUCOMTR-MCNC: 130 MG/DL (ref 70–99)
GLUCOSE BLDC GLUCOMTR-MCNC: 132 MG/DL (ref 70–99)
GLUCOSE BLDC GLUCOMTR-MCNC: 135 MG/DL (ref 70–99)
GLUCOSE BLDC GLUCOMTR-MCNC: 136 MG/DL (ref 70–99)
GLUCOSE BLDC GLUCOMTR-MCNC: 143 MG/DL (ref 70–99)
GLUCOSE BLDC GLUCOMTR-MCNC: 148 MG/DL (ref 70–99)
GLUCOSE BLDC GLUCOMTR-MCNC: 154 MG/DL (ref 70–99)
GLUCOSE BLDC GLUCOMTR-MCNC: 155 MG/DL (ref 70–99)
GLUCOSE BLDC GLUCOMTR-MCNC: 157 MG/DL (ref 70–99)
GLUCOSE BLDC GLUCOMTR-MCNC: 160 MG/DL (ref 70–99)
GLUCOSE BLDC GLUCOMTR-MCNC: 161 MG/DL (ref 70–99)
GLUCOSE BLDC GLUCOMTR-MCNC: 163 MG/DL (ref 70–99)
GLUCOSE BLDC GLUCOMTR-MCNC: 168 MG/DL (ref 70–99)
GLUCOSE BLDC GLUCOMTR-MCNC: 168 MG/DL (ref 70–99)
GLUCOSE BLDC GLUCOMTR-MCNC: 173 MG/DL (ref 70–99)
GLUCOSE BLDC GLUCOMTR-MCNC: 174 MG/DL (ref 70–99)
GLUCOSE BLDC GLUCOMTR-MCNC: 180 MG/DL (ref 70–99)
GLUCOSE BLDC GLUCOMTR-MCNC: 183 MG/DL (ref 70–99)
GLUCOSE SERPL-MCNC: 129 MG/DL (ref 70–99)
HCT VFR BLD AUTO: 21.2 % (ref 40–53)
HGB BLD-MCNC: 6.9 G/DL (ref 13.3–17.7)
LACTATE BLD-SCNC: 1.2 MMOL/L (ref 0.7–2.1)
LIPASE FLD-CCNC: 34 U/L
LIPASE SERPL-CCNC: 55 U/L (ref 73–393)
Lab: ABNORMAL
MAGNESIUM SERPL-MCNC: 1.8 MG/DL (ref 1.6–2.3)
MCH RBC QN AUTO: 28.9 PG (ref 26.5–33)
MCHC RBC AUTO-ENTMCNC: 32.5 G/DL (ref 31.5–36.5)
MCV RBC AUTO: 89 FL (ref 78–100)
PHOSPHATE SERPL-MCNC: 4.4 MG/DL (ref 2.5–4.5)
PLATELET # BLD AUTO: 102 10E9/L (ref 150–450)
POTASSIUM SERPL-SCNC: 4 MMOL/L (ref 3.4–5.3)
PROCALCITONIN SERPL-MCNC: 2.35 NG/ML
RBC # BLD AUTO: 2.39 10E12/L (ref 4.4–5.9)
SODIUM SERPL-SCNC: 142 MMOL/L (ref 133–144)
SPECIMEN SOURCE FLD: NORMAL
SPECIMEN SOURCE FLD: NORMAL
SPECIMEN SOURCE: ABNORMAL
SPECIMEN SOURCE: NORMAL
TACROLIMUS BLD-MCNC: 33.2 UG/L (ref 5–15)
TACROLIMUS BLD-MCNC: 5.3 UG/L (ref 5–15)
TME LAST DOSE: ABNORMAL H
TME LAST DOSE: NORMAL H
TRANSFUSION STATUS PATIENT QL: NORMAL
TRANSFUSION STATUS PATIENT QL: NORMAL
WBC # BLD AUTO: 2 10E9/L (ref 4–11)

## 2017-08-16 PROCEDURE — 80197 ASSAY OF TACROLIMUS: CPT | Performed by: PHYSICIAN ASSISTANT

## 2017-08-16 PROCEDURE — 25000128 H RX IP 250 OP 636: Performed by: PHYSICIAN ASSISTANT

## 2017-08-16 PROCEDURE — 00000146 ZZHCL STATISTIC GLUCOSE BY METER IP

## 2017-08-16 PROCEDURE — 40000802 ZZH SITE CHECK

## 2017-08-16 PROCEDURE — 83690 ASSAY OF LIPASE: CPT | Performed by: PHYSICIAN ASSISTANT

## 2017-08-16 PROCEDURE — 36415 COLL VENOUS BLD VENIPUNCTURE: CPT | Performed by: PHYSICIAN ASSISTANT

## 2017-08-16 PROCEDURE — 25000128 H RX IP 250 OP 636: Performed by: NURSE PRACTITIONER

## 2017-08-16 PROCEDURE — 25000132 ZZH RX MED GY IP 250 OP 250 PS 637: Performed by: STUDENT IN AN ORGANIZED HEALTH CARE EDUCATION/TRAINING PROGRAM

## 2017-08-16 PROCEDURE — 12000024 ZZH R&B TRANSPLANT CRITICAL

## 2017-08-16 PROCEDURE — P9016 RBC LEUKOCYTES REDUCED: HCPCS | Performed by: TRANSPLANT SURGERY

## 2017-08-16 PROCEDURE — 86850 RBC ANTIBODY SCREEN: CPT | Performed by: TRANSPLANT SURGERY

## 2017-08-16 PROCEDURE — 36592 COLLECT BLOOD FROM PICC: CPT | Performed by: STUDENT IN AN ORGANIZED HEALTH CARE EDUCATION/TRAINING PROGRAM

## 2017-08-16 PROCEDURE — 84100 ASSAY OF PHOSPHORUS: CPT | Performed by: STUDENT IN AN ORGANIZED HEALTH CARE EDUCATION/TRAINING PROGRAM

## 2017-08-16 PROCEDURE — 40000193 ZZH STATISTIC PT WARD VISIT

## 2017-08-16 PROCEDURE — 25000132 ZZH RX MED GY IP 250 OP 250 PS 637: Performed by: NURSE PRACTITIONER

## 2017-08-16 PROCEDURE — 27210913 ZZH NUTRITION PRODUCT INTERMEDIATE PACKET

## 2017-08-16 PROCEDURE — 80197 ASSAY OF TACROLIMUS: CPT | Performed by: STUDENT IN AN ORGANIZED HEALTH CARE EDUCATION/TRAINING PROGRAM

## 2017-08-16 PROCEDURE — 83605 ASSAY OF LACTIC ACID: CPT | Performed by: TRANSPLANT SURGERY

## 2017-08-16 PROCEDURE — 97530 THERAPEUTIC ACTIVITIES: CPT | Mod: GP

## 2017-08-16 PROCEDURE — 80048 BASIC METABOLIC PNL TOTAL CA: CPT | Performed by: STUDENT IN AN ORGANIZED HEALTH CARE EDUCATION/TRAINING PROGRAM

## 2017-08-16 PROCEDURE — 36592 COLLECT BLOOD FROM PICC: CPT | Performed by: TRANSPLANT SURGERY

## 2017-08-16 PROCEDURE — 25000132 ZZH RX MED GY IP 250 OP 250 PS 637: Performed by: PHYSICIAN ASSISTANT

## 2017-08-16 PROCEDURE — 86900 BLOOD TYPING SEROLOGIC ABO: CPT | Performed by: TRANSPLANT SURGERY

## 2017-08-16 PROCEDURE — 84145 PROCALCITONIN (PCT): CPT | Performed by: STUDENT IN AN ORGANIZED HEALTH CARE EDUCATION/TRAINING PROGRAM

## 2017-08-16 PROCEDURE — 25000132 ZZH RX MED GY IP 250 OP 250 PS 637: Performed by: COLON & RECTAL SURGERY

## 2017-08-16 PROCEDURE — 86901 BLOOD TYPING SEROLOGIC RH(D): CPT | Performed by: TRANSPLANT SURGERY

## 2017-08-16 PROCEDURE — 25000132 ZZH RX MED GY IP 250 OP 250 PS 637: Performed by: SURGERY

## 2017-08-16 PROCEDURE — 25000132 ZZH RX MED GY IP 250 OP 250 PS 637: Performed by: TRANSPLANT SURGERY

## 2017-08-16 PROCEDURE — 86923 COMPATIBILITY TEST ELECTRIC: CPT | Performed by: TRANSPLANT SURGERY

## 2017-08-16 PROCEDURE — 85027 COMPLETE CBC AUTOMATED: CPT | Performed by: STUDENT IN AN ORGANIZED HEALTH CARE EDUCATION/TRAINING PROGRAM

## 2017-08-16 PROCEDURE — 25000125 ZZHC RX 250: Performed by: PHYSICIAN ASSISTANT

## 2017-08-16 PROCEDURE — 97535 SELF CARE MNGMENT TRAINING: CPT | Mod: GO

## 2017-08-16 PROCEDURE — 97530 THERAPEUTIC ACTIVITIES: CPT | Mod: GO

## 2017-08-16 PROCEDURE — 40000133 ZZH STATISTIC OT WARD VISIT

## 2017-08-16 PROCEDURE — 82150 ASSAY OF AMYLASE: CPT | Performed by: PHYSICIAN ASSISTANT

## 2017-08-16 PROCEDURE — 27210432 ZZH NUTRITION PRODUCT RENAL BASIC LITER

## 2017-08-16 PROCEDURE — 97116 GAIT TRAINING THERAPY: CPT | Mod: GP

## 2017-08-16 PROCEDURE — 83735 ASSAY OF MAGNESIUM: CPT | Performed by: STUDENT IN AN ORGANIZED HEALTH CARE EDUCATION/TRAINING PROGRAM

## 2017-08-16 PROCEDURE — 86140 C-REACTIVE PROTEIN: CPT | Performed by: STUDENT IN AN ORGANIZED HEALTH CARE EDUCATION/TRAINING PROGRAM

## 2017-08-16 PROCEDURE — 83690 ASSAY OF LIPASE: CPT | Performed by: STUDENT IN AN ORGANIZED HEALTH CARE EDUCATION/TRAINING PROGRAM

## 2017-08-16 RX ADMIN — PIPERACILLIN AND TAZOBACTAM 3.38 G: 3; .375 INJECTION, POWDER, LYOPHILIZED, FOR SOLUTION INTRAVENOUS; PARENTERAL at 20:21

## 2017-08-16 RX ADMIN — Medication 5 ML: at 07:55

## 2017-08-16 RX ADMIN — SODIUM BICARBONATE 650 MG TABLET 1300 MG: at 14:15

## 2017-08-16 RX ADMIN — Medication 1 PACKET: at 20:26

## 2017-08-16 RX ADMIN — PANTOPRAZOLE SODIUM 40 MG: 40 TABLET, DELAYED RELEASE ORAL at 07:55

## 2017-08-16 RX ADMIN — MICONAZOLE NITRATE: 20 CREAM TOPICAL at 20:23

## 2017-08-16 RX ADMIN — GANCICLOVIR SODIUM 650 MG: 500 INJECTION, POWDER, LYOPHILIZED, FOR SOLUTION INTRAVENOUS at 17:13

## 2017-08-16 RX ADMIN — Medication 250 MG: at 07:55

## 2017-08-16 RX ADMIN — Medication 1 PACKET: at 09:26

## 2017-08-16 RX ADMIN — AMLODIPINE BESYLATE 5 MG: 10 TABLET ORAL at 07:54

## 2017-08-16 RX ADMIN — PIPERACILLIN AND TAZOBACTAM 3.38 G: 3; .375 INJECTION, POWDER, LYOPHILIZED, FOR SOLUTION INTRAVENOUS; PARENTERAL at 09:22

## 2017-08-16 RX ADMIN — Medication 250 MG: at 20:22

## 2017-08-16 RX ADMIN — HUMAN INSULIN 3 UNITS/HR: 100 INJECTION, SOLUTION SUBCUTANEOUS at 19:30

## 2017-08-16 RX ADMIN — ASCORBIC ACID TAB 250 MG 250 MG: 250 TAB at 07:55

## 2017-08-16 RX ADMIN — LOPERAMIDE HYDROCHLORIDE 4 MG: 2 SOLUTION ORAL at 20:22

## 2017-08-16 RX ADMIN — Medication 5 ML: at 20:22

## 2017-08-16 RX ADMIN — Medication 80 MG: at 07:54

## 2017-08-16 RX ADMIN — SODIUM BICARBONATE 650 MG TABLET 1300 MG: at 20:22

## 2017-08-16 RX ADMIN — SULFAMETHOXAZOLE AND TRIMETHOPRIM 80 MG: 200; 40 SUSPENSION ORAL at 07:54

## 2017-08-16 RX ADMIN — PIPERACILLIN AND TAZOBACTAM 3.38 G: 3; .375 INJECTION, POWDER, LYOPHILIZED, FOR SOLUTION INTRAVENOUS; PARENTERAL at 01:55

## 2017-08-16 RX ADMIN — LOPERAMIDE HYDROCHLORIDE 4 MG: 2 SOLUTION ORAL at 16:16

## 2017-08-16 RX ADMIN — OXYCODONE HYDROCHLORIDE 5 MG: 5 SOLUTION ORAL at 12:17

## 2017-08-16 RX ADMIN — SODIUM BICARBONATE 650 MG TABLET 1300 MG: at 07:53

## 2017-08-16 RX ADMIN — MICONAZOLE NITRATE: 20 CREAM TOPICAL at 11:42

## 2017-08-16 RX ADMIN — PIPERACILLIN AND TAZOBACTAM 3.38 G: 3; .375 INJECTION, POWDER, LYOPHILIZED, FOR SOLUTION INTRAVENOUS; PARENTERAL at 14:23

## 2017-08-16 RX ADMIN — Medication 5 ML: at 16:16

## 2017-08-16 RX ADMIN — Medication 5 ML: at 07:54

## 2017-08-16 RX ADMIN — Medication 1 PACKET: at 08:05

## 2017-08-16 RX ADMIN — Medication 220 MG: at 07:55

## 2017-08-16 RX ADMIN — Medication 1 PACKET: at 14:16

## 2017-08-16 RX ADMIN — FLUDROCORTISONE ACETATE 0.1 MG: 0.1 TABLET ORAL at 07:54

## 2017-08-16 RX ADMIN — Medication 5 ML: at 12:17

## 2017-08-16 RX ADMIN — FLUDROCORTISONE ACETATE 0.1 MG: 0.1 TABLET ORAL at 20:23

## 2017-08-16 RX ADMIN — Medication 1 PACKET: at 20:27

## 2017-08-16 RX ADMIN — LOPERAMIDE HYDROCHLORIDE 4 MG: 2 SOLUTION ORAL at 07:54

## 2017-08-16 RX ADMIN — LOPERAMIDE HYDROCHLORIDE 4 MG: 2 SOLUTION ORAL at 12:17

## 2017-08-16 ASSESSMENT — PAIN DESCRIPTION - DESCRIPTORS
DESCRIPTORS: ACHING
DESCRIPTORS: TENDER

## 2017-08-16 NOTE — PROGRESS NOTES
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        Assessment & Recommendations:       1. Non oliguric EJ - Improving and generally at baseline while on CNI. Creat 1.5 today. Peak creat 1.94 on 8/13/17. Creat was 0.8-0.9 from 7/31 to 8/6. Had Contrast on 8/4, drain placement on 8/9, restarted Prograf on 8/10  Etiology infection, contrast, resumption of Prograf.   Baseline creat 1.4-1.7 on TAC   - Back to previous baseline while on CNI   - Continue to monitor renal function daily with chemistry labs   - Avoid hypotension/Nephrotoxins   - Renal dose medications      2. Volume status - Euvolemic. Creat has declined. No edema, hypoxia. No weight since 8/14 and that was likely inaccurate given that it was 4 kg above weight day prior!. -160/.  + unmeasured, stool 1600 cc, drain 705 cc.    - Stool output goal is 1200 cc /day per Surgery.     - Continue daily standing weights   - Continue I/O     3. HTN - Uncontrolled. -160/. Previously on Amlodipine and Clonidine.    - Just restarted Amlodipine 5 mg qd on 8/15. Would increase in several days if no improvement.      4. Electrolytes - No acute concerns. K 4, Na 142   - Continue Florinef 0.1 mg bid     5. Acid base - Metabolic acidosis with EJ/Diarrhea. Bicarb 16. Currently on Bicarb 1300 mg TID    - Primary team working on controlling Diarrhea   - Would increase Bicarb to QID     6. BMD - Ionized Ca 4.6, Phos 4.4.      7. Antimicrobials - Ganciclovir, Zosyn, Bactrim. WBC 2.7. Afebrile. Abdominal drain placement 8/9/17. Imaging study yesterday shows decrease in size of fluid collection.      8. Liver transplant, IS - Prograf gtt. Level 33.2!     9. Nutrition - Currently NPO. Albumin 1.8, on  Protein pack TID.     10. Disposition - Given improvement in renal function, Nephrology will sign off. Please call with questions      Recommendations were communicated to primary team via progress note      Seen and discussed with Dr. Dena Cazares, NP   [phone]      Interval History :       Creat improved   Remains non oliguric      Review of Systems:       I reviewed the following systems:  GI: NPO.    Neuro:  sleepy  Constitutional:  no fever or chills  CV: denies dyspnea, CP or edema.    : Voiding w/o vazquez, incontinent    Physical Exam:   I/O last 3 completed shifts:  In: 2574.17 [P.O.:400; I.V.:1764.17; Other:90; NG/GT:60]  Out: 1905 [Urine:150; Drains:830; Stool:925]   BP (!) 170/92  Pulse 88  Temp 98.4  F (36.9  C) (Oral)  Resp 20  Ht 1.829 m (6')  Wt 89.4 kg (197 lb)  SpO2 95%  BMI 26.72 kg/m2     GENERAL APPEARANCE: sleepy but rousable  PULM: lungs CTA. Breathing is non labored. Not on supplemental oxygen  CV: tachy      -edema - none   GI: soft, NT. Staple line intact. Colostomy with large amt of liquid stool. Right sided drains with pink drainage  ; Voiding w/o vazquez, incontinent  NEURO:  sleepy    Labs:   All labs reviewed by me  Electrolytes/Renal -   Recent Labs   Lab Test  08/16/17   0650  08/15/17   0639  08/14/17   0621   NA  142  140  143   POTASSIUM  4.0  4.3  4.4   CHLORIDE  117*  115*  114*   CO2  16*  18*  16*   BUN  65*  75*  71*   CR  1.50*  1.87*  1.92*   GLC  129*  112*  133*   JACOB  8.3*  8.1*  8.4*   MAG  1.8  1.9  2.1   PHOS  4.4  4.0  5.1*       CBC -   Recent Labs   Lab Test  08/16/17   0650  08/15/17   0639  08/14/17   0621   WBC  2.0*  2.7*  3.4*   HGB  6.9*  7.4*  8.1*   PLT  102*  118*  129*       LFTs -   Recent Labs   Lab Test  08/14/17   0621  08/10/17   0704  08/08/17   0600   ALKPHOS  271*  136  172*   BILITOTAL  0.7  2.2*  0.5   ALT  17  26  25   AST  24  36  28   PROTTOTAL  6.1*  6.0*  6.4*   ALBUMIN  1.8*  1.9*  2.1*       Iron Panel -    Recent Labs   Lab Test  02/08/16   1144   IRON  93   IRONSAT  41       Current Medications:    amLODIPine  5 mg Oral Daily     sodium chloride (PF)  10 mL Irrigation Q8H     iohexol  50 mL Oral Once     fiber modular  1 packet Per Feeding Tube BID     sodium chloride (PF)  20 mL Irrigation Q6H     diphenoxylate-atropine  5 mL Oral 4x Daily     saccharomyces boulardii  250 mg Oral BID     sodium bicarbonate  1,300 mg Per NG tube TID     ascorbic acid  250 mg Oral Daily     sulfamethoxazole-trimethoprim  10 mL Oral Daily     ganciclovir (CYTOVENE) intermittent infusion  7.5 mg/kg Intravenous Q24H     miconazole with skin protectant   Topical BID     piperacillin-tazobactam  3.375 g Intravenous Q6H     fludrocortisone  0.1 mg Oral BID     multivitamin CF formula  5 mL Per Feeding Tube Daily     zinc sulfate  220 mg Per Feeding Tube Daily     loperamide  4 mg Oral or Feeding Tube 4x Daily     insulin aspart   Subcutaneous TID w/meals     protein modular  1 packet Per Feeding Tube TID     aspirin  80 mg Oral Daily     pantoprazole  40 mg Oral or Feeding Tube Daily     sodium chloride (PF)  3 mL Intracatheter Q8H       lactated ringers 50 mL/hr at 08/16/17 0817     IV fluid REPLACEMENT ONLY       insulin (regular) 1 Units/hr (08/16/17 1102)     NaCl 10 mL/hr at 08/14/17 0101     tacrolimus (Prograf) infusion ADULT 110 mcg/hr (08/16/17 0817)     Cinda Cazares, NP

## 2017-08-16 NOTE — PROGRESS NOTES
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 Admitted 7/4/17 from [facility] for evaluation and management of sepsis secondary to colitis, taken to OR for initial exploratory laparotomy with findings of typhlitis in the right colon, wound left open with wound vac in place for reexploration and interval closure on 7/5/2017. Concern for CMV colitis with low count CMV viremia/GI PTLD and subsequently underwent colonoscopy on 7/21, random biopsies obtained.  On 7/25/2017 patient became septic with abdominal compartment syndrome. CT noted new large heterogeneous right sided retroperitoneal gas and air tracking along duodenum and underwent a exploratory laparotomy, right angelique-colectomy, end ileostomy, mucus fistula, partial omentectomy on 7/26 by colorectal surgery.  Antibiotics discontinued 8/3. 8/6 became septic with increased abd pain, confusion. 8/8 right retroperitoneal drain placed. 8/11 para with dh=1637. Additional retroperitoneal drain placed 8/15.    Graft Function: Good function. LFTs acceptable (checking every Monday, Thursday).   Immunosuppression Management:   CellCept discontinued (6/27/17) due to neutropenia.   Prograf goal ~5-6 due to sepsis.  Unable to obtain detectable level with suspension.  Level 8/15, 6.6 tacrolimus drip was continued at 110/hr.  Today's level (drawn from PICC) returned high at 33, will turn down drip for now, but will recheck peripheral level and readjust accordingly.   Cardiorespiratory: Stable respiratory status, NLB on RA. Assist with aggressive pulmonary toilet. OOB to chair and ambulate as tolerated.   HTN:-169, Restart amlodipine 5mg suspension. Pt on Florinef.   Hematology: Pancytopenia present on admission, persists. Continue to hold MMF.   Primary CMV infection  Anemia- Hemoglobin range 7-8, today's hgb 6.9. Transfusing 1 unit today.  Last blood  transfusion on 7/26  Leukopenia-stable WBC 2.0.   GI:   -Neutropenic colitis on admission. Colonoscopy 7/21. Biopsy: resolving injury secondary to possible drug induced vs infectious.   -Bowel perforation: 7/25 CT scan abd/pelvis-new large heterogeneous right sided retroperitoneal gas and air tracking along duodenum.  No contrast extravasation.  No intraperitoneal air noted. Colorectal surgery performed Ex lap, right angelique-colectomy, end ileostomy, mucus fistula, partial omentectomy on 7/26. Findings no neida perforation, phlegmon/inflammation right colon. Colon pathology suggested colon perforation, negative for malignancy; CMV stain positive.    -Retroperitoneal abscess/ascites peritonitis: CT A/P 8/9 showed a persistent gas/fluid collection RLQ, peritonitis, some free air but no evidence of contrast extravasation. 8/11: Diag. Para WBC 5,038. Repeat CT scan abd/pelvis 8/14 - Complex gas-containing fluid collection in the right retroperitoneum decreased slightly in size. Moderate ascites, with intraperitoneal gas, peritonitis noted.  IR placed additional drain to abdomen with cloudy/turbid fluid. No leak identified on CT scan yesterday.   -Diet:  CLD-Restart tube feeds, sent amylase and lipase per drain, were normal, will restart oral diet.  Patient was on dysphagia diet level 3 with thin liquids.    -High output  ileostomy:  Goal ileostomy output ~ 1000 cc in 24 hrs.  Output 1.6L yesterday.  Loperamide 4mg QID/ Lomotil QID/Fiber BID.  Fluid/Electrolytes/Renal:   -Dehydration: improved after IVF. Continue with MIVF while NPO   -EJ: on admission, d/t ATN sec to sepsis. SCR remains elevated but stable around 1.8, 1.5 today. Nephrology following.  -Hypernatremia: resolved with free water.    -Hyperkalemia: continue florinef BID.   Avoid kayexalate due to recent bowel perforation.    Endocrine: DM type 2. Endocrine consulting for glycemic management.   Infectious disease: Afebrile.  WBC 2.0. ID following.     -Retroperitoneal abscess: CT C/A/P 8/9 showed a persistent gas/fluid collection RLQ, peritonitis, some free air but no evidence of contrast extravasation.  IR placed RP drain, growing clostridium- on zosyn, linezolid. Stop linezolid 8/15. Continue zosyn indefinitely at this time.  -CMV viremia: Primary CMV infection.  (CMV R-D+) CMV colitis per pathology, peak serum 7/15 4225 IU/ml.   IV Ganciclovir (started 7/20) -Continue high maintenance dosing. Follow CMV PCR weekly, now <137 x 2 weeks.  Next due 8/17.  -EBV viremia:  Peak serum 406,697 copies/ml on 7/18.  Down to 6864 as of 8/1.  Check weekly, now improved to 753.  -R/o SBP or abscess:  8/11 paracentesis.  WBC 5,038, cx negative.  Repeat today-fluid cloudy/purulent with cc=15,680.  Resolved issues:  -Severe sepsis: On admission, secondary to right colon phlegmon. Meropenem/daptomycin/micafungin tx x 10 days completed on 8/3. S/p right angelique-colectomy.  Path: CMV stain +, perforation of colon noted.  7/5 Fluid culture + staph capitis broth only (contamination).  Neuro: Toxic metabolic encephalopathy secondary to infection, prolonged hospital stay. Improving,virtual sitter present.   Vascular:  Evidence of microvascular injury in digits (toes) this is most likely due to injury while patient was on pressor therapy with sepsis.  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   Activity: Deconditioned due to prolong hospital course. Daily PT/OT, encourage pt to be OOB to chair. Encourage pulmonary toilet.   Prophylaxis: DVT-restart Heparin SQ  Disposition: 7A.     Medical Decision Making: Medium    PILLO/Fellow/Resident Provider:  Lori Willingham PA-C    Faculty: Tip Zhang M.D.      __________________________________________________________________  Transplant History:.  3/4/2017 DD OLT with Dr. Tran (Liver), Postoperative day: 165     Interval History:   Overnight events: No acute events overnight. Virtual sitter present due to  patient pulling at lines.  CT yesterday shows no evidence of leak, will restart tube feeds and oral diet.    ROS:   A 10-point review of systems was negative except as noted above.    Meds:    amLODIPine  5 mg Oral Daily     sodium chloride (PF)  10 mL Irrigation Q8H     iohexol  50 mL Oral Once     fiber modular  1 packet Per Feeding Tube BID     sodium chloride (PF)  20 mL Irrigation Q6H     diphenoxylate-atropine  5 mL Oral 4x Daily     saccharomyces boulardii  250 mg Oral BID     sodium bicarbonate  1,300 mg Per NG tube TID     ascorbic acid  250 mg Oral Daily     sulfamethoxazole-trimethoprim  10 mL Oral Daily     ganciclovir (CYTOVENE) intermittent infusion  7.5 mg/kg Intravenous Q24H     miconazole with skin protectant   Topical BID     piperacillin-tazobactam  3.375 g Intravenous Q6H     fludrocortisone  0.1 mg Oral BID     multivitamin CF formula  5 mL Per Feeding Tube Daily     zinc sulfate  220 mg Per Feeding Tube Daily     loperamide  4 mg Oral or Feeding Tube 4x Daily     insulin aspart   Subcutaneous TID w/meals     protein modular  1 packet Per Feeding Tube TID     aspirin  80 mg Oral Daily     pantoprazole  40 mg Oral or Feeding Tube Daily     sodium chloride (PF)  3 mL Intracatheter Q8H       Physical Exam:     Admit Weight: 94.2 kg (207 lb 11.2 oz) (SCALE 2)    Current vitals:   /89  Pulse 88  Temp 97.7  F (36.5  C) (Oral)  Resp 20  Ht 1.829 m (6')  Wt 89.4 kg (197 lb)  SpO2 94%  BMI 26.72 kg/m2    Vital sign ranges:    Temp:  [97.7  F (36.5  C)-98.4  F (36.9  C)] 97.7  F (36.5  C)  Pulse:  [86-88] 88  Heart Rate:  [84-94] 91  Resp:  [18-22] 20  BP: (148-170)/(74-95) 164/89  SpO2:  [94 %-96 %] 94 %  Patient Vitals for the past 24 hrs:   BP Temp Temp src Pulse Heart Rate Resp SpO2   08/16/17 1235 164/89 97.7  F (36.5  C) Oral - 91 20 94 %   08/16/17 1130 (!) 170/92 98.4  F (36.9  C) Oral - 94 20 95 %   08/16/17 1028 (!) 166/94 97.7  F (36.5  C) Oral - 89 22 95 %   08/16/17 1006 164/89  97.7  F (36.5  C) Oral 88 - 22 95 %   08/16/17 0746 (!) 169/95 98.4  F (36.9  C) - - 89 22 94 %   08/16/17 0354 165/88 98.2  F (36.8  C) Oral - 84 20 94 %   08/15/17 2343 156/83 98.1  F (36.7  C) Oral - 85 20 94 %   08/15/17 2100 152/83 98  F (36.7  C) Oral 87 87 20 96 %   08/15/17 1552 148/74 97.9  F (36.6  C) Oral 86 86 18 96 %     General Appearance: NAD  Skin: normal, warm, dry  Heart: RRR  Lungs: nonlabored resps on RA  Abdomen: soft, rounded, more tender. Ileostomy with brown output. Mucus fistula covered. Midline incision, c/d/i.   : Crowe present.  Extremities: No edema.  Necrotic blackened areas on right 1st & 2nd toes and left 2nd toe.  Neurologic: A&O x 2, waxes and wanes. Nonsensical speech at times.       Data:   CMP    Recent Labs  Lab 08/16/17  1100 08/16/17  0650 08/15/17  0639 08/14/17  0621  08/10/17  0704   NA  --  142 140 143  < > 140   POTASSIUM  --  4.0 4.3 4.4  < > 5.5*   CHLORIDE  --  117* 115* 114*  < > 112*   CO2  --  16* 18* 16*  < > 18*   GLC  --  129* 112* 133*  < > 121*   BUN  --  65* 75* 71*  < > 61*   CR  --  1.50* 1.87* 1.92*  < > 1.70*   GFRESTIMATED  --  49* 38* 37*  < > 42*   GFRESTBLACK  --  59* 46* 45*  < > 51*   JACOB  --  8.3* 8.1* 8.4*  < > 8.5   MAG  --  1.8 1.9 2.1  < > 1.9   PHOS  --  4.4 4.0 5.1*  < > 4.6*   LIPASE  --  55*  --   --   --   --    ALBUMIN  --   --   --  1.8*  --  1.9*   BILITOTAL  --   --   --  0.7  --  2.2*   ALKPHOS  --   --   --  271*  --  136   AST  --   --   --  24  --  36   ALT  --   --   --  17  --  26   FAMY 10  --   --   --   < >  --    FLIPA 34  --   --   --   --   --    < > = values in this interval not displayed.  CBC    Recent Labs  Lab 08/16/17  0650 08/15/17  0639   HGB 6.9* 7.4*   WBC 2.0* 2.7*   * 118*     COAGS  No lab results found in last 7 days.    Invalid input(s): XA   Urinalysis  Recent Labs   Lab Test  08/10/17   1752  08/08/17   1600   06/14/17   1508   04/11/16   1345   COLOR  Yellow  Yellow   < >   --    < >  Yellow  "  APPEARANCE  Slightly Cloudy  Slightly Cloudy   < >   --    < >  Clear   URINEGLC  Negative  Negative   < >   --    < >  30*   URINEBILI  Small   This is an unconfirmed screening test result. A positive result may be false.  *  Negative   < >   --    < >  Negative   URINEKETONE  Negative  Negative   < >   --    < >  Negative   SG  1.012  1.010   < >   --    < >  1.016   UBLD  Negative  Negative   < >   --    < >  Small*   URINEPH  5.0  5.0   < >   --    < >  5.0   PROTEIN  30*  30*   < >   --    < >  30*   NITRITE  Negative  Negative   < >   --    < >  Negative   LEUKEST  Negative  Negative   < >   --    < >  Negative   RBCU  0  3*   < >   --    < >  1   WBCU  10*  7*   < >   --    < >  1   UTPG   --    --    --   1.55*   --   0.41*    < > = values in this interval not displayed.          7/21/2017   SPECIMEN(S):   A: Colon biopsy, ascending   B: Colon biopsy, descending     FINAL DIAGNOSIS:   A. COLON BIOPSY, ASCENDING:   - Colonic mucosa with no significant histologic abnormality   - Negative for intraepithelial lymphocytosis or deposition of   subepithelial thick collagenous band     B. COLON BIOPSY, DESCENDING:   - Crypt architecture distortion, consistent with healed prior injury   - Negative for active inflammation or dysplasia   - Negative for intraepithelial lymphocytosis or deposition of   subepithelial thick collagenous band   - Please, see comment     COMMENT:   Specimen B, \"descending colon\" features lamina propria edema, slight   variation in crypt sizes and shapes and occasional crypt drop-out.  This   may indicate a resolving injury which could be drug-induced or   infectious.       "

## 2017-08-16 NOTE — PLAN OF CARE
Problem: Goal Outcome Summary  Goal: Goal Outcome Summary  Outcome: Improving  Hypertensive per baseline, 165/88 this morning, OVSS on RA. Blood sugars checked hourly per insulin gtt protocol. 117-135 this shift. Currently at 0.2units/hr using algorithm 1. Denies pain or nausea. Tolerating NPO status. No tube feeds running per physician order at this time. PICC infusing NS TKO, LR at 50mL/hr and tacrolimus at 5.5mL/hr. Drains x2 to right flank. Both are draining clear, serosanguineous fluid. Ileostomy put out 125mL of brown liquid stool. Incontinent of urine all night. Mucous fistula covered with primipore. Small amount of drainage noted on the dressing. Abdominal incision well approximated. Able to plant feet and push himself up in bed. Standing with PT during the day. Confusion continues. Pleasant and cooperative with cares this shift.

## 2017-08-16 NOTE — PLAN OF CARE
Problem: Goal Outcome Summary  Goal: Goal Outcome Summary  OT/7A:  Max AX2 for supine to sit transfer to EOB.  Facilitated functional mobility ~60' to improve strength and endurance necessary to return to ADLs and household distance ambulation, pt requires min AX2 for STS from elevated surface and min AX1 for walker navigation + CGAX1 for line management.  Pt engages in seated ADLs at bedside chair with set up and max A for task initiation.  Pt limited by pain, endurance, strength, and cognition.      REC:  TCU to improve safety and IND with I/ADLs, transfers, and functional mobility.

## 2017-08-16 NOTE — PLAN OF CARE
Problem: Goal Outcome Summary  Goal: Goal Outcome Summary     SLP: Per review of chart, pt continues to be medically NPO. TFs held. Will hold SLP session this date as pt not appropriate to participate in PO trials.

## 2017-08-16 NOTE — PLAN OF CARE
Problem: Goal Outcome Summary  Goal: Goal Outcome Summary  PT/7A: Pt requires max encouragement to participate with therapy. Pt transfers supine>sit with maxAx2 but sit>supine with CGA. Pt transfers sit<>stand from EOB with CGA/minAx2 and FWW and amb 60 ft in bishop, demonstrating slow and slightly unsteady gait pattern, requiring close CGA. PT recommends DC to TCU once medically stable to improve strength and endurance for functional mobility.

## 2017-08-16 NOTE — PROGRESS NOTES
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  Now s/p ex lap with R hemicolectomy, end ileostomy, mucus fistula, partial omenectomy on 7/26.    BG on IV insulin well controlled ,On steroids : 0.1 mg (addendum correction) Fludrocortisone, Cr improving from EJ, remains acidotic, TF maybe restarted and will need to be titrated to goal    Plan:  -does not require D10W for hypoglycemia prevention since insulin drip can be titrated down  -continue insulin drip.    -meal aspart 1unit/7g CHO ordered for meals and snacks    Will continue to follow  Please notify diabetes team of changes planned for nutrition support schedule.     Outpatient diabetes follow up: Dr. Eckert at [facility]                   Interval History:   8/7/17 Pt upset about inpatient diabetes mgmt consult.  Transplant contacted pt's outpatient endocrinologist Dr. Eckert of [facility]-- no issue with inpatient team managing pt's glucose    Reviewed with transplant.  Pt awaiting small bowel follow through.  TF off for tests and uncertain when will resume pending completion of investigation into bowel health.  May need to start TPN, may restart dysphagia diet of thin liquids   Note there are updated RD recs for nutrition support (was on Nepro).  Camacho having bouts of confusion and pulling at lines.  Improved today  No resp, constitutional C/O      Recent Labs  Lab 08/16/17  1607 08/16/17  1503 08/16/17  1413 08/16/17  1316 08/16/17  1207 08/16/17  1102  08/16/17  0650  08/15/17  0639  08/14/17  0621  08/13/17  0631  08/12/17  0630  08/11/17  0650   GLC  --   --   --   --   --   --   --   129*  --  112*  --  133*  --  113*  --  147*  --  112*   * 168* 180* 183* 148* 143*  < >  --   < >  --   < >  --   < >  --   < >  --   < >  --    < > = values in this interval not displayed.            Review of Systems:   See interval hx          Medications:   PTA taking Januvia and glargine versus glargine and aspart per carb    Active Diet Order      Dysphagia Diet Level 3 Advanced Thin Liquids (water, ice chips, juice, milk gelatin, ice cream, etc)     Physical Exam:  Gen: alert, NAD, preparing to sit up and do PT  Skin Warm, dry   Resp: normal, on RA  Neuro: A&O x 2, waxes and wanes. Nonsensical speech at times.    BP (!) 117/93 (BP Location: Left arm)  Pulse 88  Temp 97.2  F (36.2  C) (Oral)  Resp 20  Ht 1.829 m (6')  Wt 89.4 kg (197 lb)  SpO2 93%  BMI 26.72 kg/m2           Data:     Lab Results   Component Value Date    A1C  07/06/2017     Canceled, Test credited   Below Assay Range  NOTIFIED LEONARD ONEILL AT 0538 ON 7/6/17 BY CHRISSY      A1C 9.4 02/16/2017    A1C 6.2 12/14/2016    A1C 6.1 10/27/2016    A1C 8.3 07/02/2012            Recent Labs   Lab Test  08/15/17   0639  08/14/17   0621   NA  140  143   POTASSIUM  4.3  4.4   CHLORIDE  115*  114*   CO2  18*  16*   ANIONGAP  8  13   GLC  112*  133*   BUN  75*  71*   CR  1.87*  1.92*   JACOB  8.1*  8.4*     CBC RESULTS:   Recent Labs   Lab Test  08/15/17   0639   WBC  2.7*   RBC  2.59*   HGB  7.4*   HCT  23.3*   MCV  90   MCH  28.6   MCHC  31.8   RDW  17.4*   PLT  118*     Liver Function Studies -   Recent Labs   Lab Test  08/14/17   0621   PROTTOTAL  6.1*   ALBUMIN  1.8*   BILITOTAL  0.7   ALKPHOS  271*   AST  24   ALT  17     Diabetes Management job code [id number]  Roselia Braydon [facility] pager [phone]

## 2017-08-16 NOTE — PROGRESS NOTES
"[facility]  Transplant Infectious Diseases Progress Note      Camacho Bhagat MRN# [id number]   YOB: 1965 Age: 51 year old   Date of Service: 8/15/2017        Assessment and Recommendations:     Recommendations:  - Consider additional imaging (?UGI with small bowel follow through +/- imaging from below) to further assess for enterostomic leak  - Continue empiric Zosyn indefinitely for the right retroperitoneal abscess, new intraperitoneal gas foci in setting of ongoing ascites and apparent secondary peritonitis; would favor eventually trying a switch to oral (levofloxacin / metronidazole) on the presumption that he has an unclosed persistent enterostomy with ongoing seeding of his peritoneum  - Cell count from ascitic fluid 8/16 with increasing WBC (15k), remains c/w bacterial peritonitis as expected, will f/u on cultures sent from IR drain of intra-peritoneal fluid 8/15  - EBV blood PCR repeated 8/15/17, results pending   - Continue to monitor blood CMV PCR assays weekly (next due 8/17/17).  - Continue TMP-SMX prophylaxis.  - Continue PO ganciclovir to \"high-maintenance\" dose at 7.5 mg / kg daily  - Leukopenia likely d/t Ganciclovir, recommend continuing Ganciclovir as above for now  - While leukopenic recommend daily differential with CBC --> If ANC <1.0, recommend G-CSF x1 (300mcg) and re-check ANC in am before re-dosing     Transplant ID will continue to follow.    Patient seen and discussed with attending physician, Dr. Del Toro.     Reshma Concepcion MD  Internal Medicine, PGY-3  [phone]    Assessment:  A 51 year old gentleman immunosuppressed (tacrolimus; MMF on hold; received basiliximab initial induction) s/p liver transplant on 3/4/17 for CASTAÑEDA who was admitted 7/4/17 with colitis and underwent 7/26/17 exploratory laparotomy after forming a RUQ phlegmon possibly due to colonic perforation.  The initial admission diagnosis was neutropenic colitis, but new primary " seroconversion CMV viremia was discovered on 7/10/17.  He had persistent fevers from 7/10 - 15/17 and again from 7/21 - 8/4/17, was afebrile for three+ days, but has spiked renewed fevers (up to 100.7 degrees) since 8/7/17 late evening and is looking septic with increased malaise and abdominal pain, obtundation, increased delirium, rising inflammatory markers, and a higher peripheral WBC.  Repeat 8/9/17 abdominal CT scan showed right sided free air.  A RUQ retroperitoneal abscess drain was placed by IR on 8/9/17 and broad-spectrum antibiotic therapy resumed -- since then he has improved and remains clinically stable as of 8/16 with no recurrent decompensation. Repeat 8/14 abdominal CT scan showed new intraperitoneal free air, peritoneal pigtail catheter placed by IR 8/15.     ID issues:  # Renewed peritoneal sepsis / RUQ abdominal abscess noted on 8/9, unchanged on 8/14  # Persistent Ascites with new foci intraperitoneal gas on CT 8/14  Off antimicrobails (except TMP-SMX and Valcyte) for five days (8/4 - 8/8), after completing a ten day 7/25 - 8/3/17 empiric course of meropenem / daptomycin / micafungin.  On 8/9 pt again developed sepsis with a markedly increased procalcitonin, increased serum CRP, increased abdominal pain, and worsened confusion / obtundation.  8/9/17 chest / abdomen CT scan demonstrated little evidence of new infection above the diaphragm, but new infra-hepatic free air and an unchanged (versus the 8/4/17 abdominal CT scan) right retroperitoneal RUQ multifocal gas collection suggestive for an abscess.  Interventional Radiology placed a drain into that abscess 8/9/17 afternoon -- cultures have grown only a Clostridium species in the anaerobic broth culture.  Other potential sites for his sepsis beyond the abdomen were lacking and blood cultures remain negative.  On resumed empiric antibiotic therapy with Zosyn / linezolid since 8/9/17, his sepsis has resolved and he is again looking better with  "less obtundation.  All this has occured is in the wake of an exploratory laparotomy on 7/26/17 with a right angelique-colectomy / end ileostomy / mucous fistula / partial omentectomy -- intra-operatively no overt perforation was seen at that time, but right colitis was observed with a probable intra-adominal abscess or phlegmon (thought possibly due to a colonic perforation which was seen on the excised colonic histopathology).was found in BALs, and 8/5/17 ascites studies / cultures remain unremarkable.  While the sepsis seems presently controlled with drainage and broad-spectrum empiric antibiotics, his course, ongoing ascites fluid cell counts evidence of peritonitis (although ascites fluid cultures are suppressed on antibiotics), and the presence of Clostridium in the 8/9/17 anaerobic drainage culture implies that he very well may have an ongoing enterostomic leak into his peritoneum.  Repeat CT abd/pelvis done 8/14 demonstrates stable to mildly improved RP fluid collection, ongoing ascites, and a new foci of intraperitoneal gas that may be attributed to redistribution of peritoneal gas vs. Gas producing infection vs perforation or dehiscence of  Bowel. These CT findings further support concern for ongoing enterostomic leak. Discussed briefly with transplant surgery this am. Patient went for sinogram and additional drain placement 8/15; ID services continues to be concerned that drains alone will not provide adequate source control if there is an enterostomic leak; however patient is extremely high risk for re-operation and surgeons would prefer to manage with drains and aniti-microbials and continue to monitor.  Would recommend further imaging studies to help determine presence of leak.  Lastly, agree with dc of Linezolid 8/15, will continue to monitor now that patient is without VRE coverage     # CMV viremia /  CMV colitis:    He presented with a \"detected\" low level CMV viremia (detected < 137 IU / ml) upon " "admission and developed a progressively worsening viremia after stopping his Valcyte prophylaxis.  Valcyte was re-started on 7/15/17 when the blood CMV PCR viral load was 4,225 IU / ml (3.6 log) and that was switched to ganciclovir on 7/20/17 with a viral load of 1,906 IU /ml (3.3 log). After one week of GCV treatment, the blood CMV PCR dropped to 290 IU / ml (2.5 log) on 7/27/17 and decreased further to < 137 IU / ml (< 2.1 log) on 8/3/17 (a > 1 log drop in the viremia over two weeks).  The CMV immunostain of colonic tissue biopsy was positive; confirming a CMV etiology of his colitis.  Because of an initial slow pace of viremia decline, he had been treated with supra-high dose ganciclovir (doubled to 10 mg / kg / dose BID) for the possibility of ganciclovir resistance, but with the improving viral load on 8/3/17, his ganciclovir dose was reduced to 7.5 mg / kg IV BID on 8/4/17 PM.  On that, the 8/10/17 repeat CMV viral load was again undetectable, so he was decreased to \"high maintenance\" ganciclovir dosing at 7.5 mg / kg / day on 8/11/17.  Weekly blood CMV PCR viral load checks (next due to be checked 8/17/17) will continue to make certain they remain suppressed at this maintenance dose.  (A CMV drug resistance genotype assay ordered on 7/27/17 went astray, so we have no genotypic data to help with decisions.) Of note, Ganciclovir likely responsible for leukopenia but not preferable to hold or decrease dose given clinical context.     # Hx Pancytopenia / Recurrent Leukopenia:   Patient has known hx pancytopenia in the past, attributed to Valcyte and Bactrim. Has ongoing chronic anemia and thrombocytopenia this admission, though leukopenia had resolved while intermittently septic with normal WBC during most of admission. Over past 72 hours beginning 8/12 patient has had leukopenia; no differential completed during that time so unclear if neutropenic. Suspect this is due to IV Ganciclovir, especially given that " patient has received supra-therapeutic doses this admission given c/f low level CMV resistance to ganciclovir. As patient is recovering from CMV viremia and remains at high risk for recurrence, would recommend treating with G-CSF as needed as opposed to holding or decreasing dose of Ganciclovir. Would add on differential to CBC and treat only for ANC <1.0,      # Encephalopathy:  His delirium and somnolence seem to vary with the state of his overall health and abdominal sepsis, so likely represent toxic-metabolic encephalopathy associated with sepsis and also somewhat with medications side effects superimposed on chronic hepatic encephalopathy.  A 7/20/17 brain MRI scan was unremarkable and a repeat brain MRI has not yet been deemed needed.  An 8/5/17 serum cryptococcal antigen assay was negative.  The likelihood of any CNS infection at this time is exceeding low and additional CNS evaluation is presently not needed.    # Improved EBV viremia:  Initially discovered to have EBV viremia at 406,697  / ml on 7/18/17.  With decreased immunosuppression, that had fallen to 6,864  / ml by 8/1/17.  Checking blood EBV PCRs every couple weeks is reasonable for now.  Random colonic biopsies from the 7/21/17 colonoscopy and 7/26/17 hemicolectomy histopathology results were not suggestive for PTLD. Repeat EBV PCR sent 8/15, will follow up results    # S/p liver transplant:  Continue on tacrolimus; but MMF was held 6/27/17 in the context of sepsis and pancytopenia    # Recurrent EJ:  Patient initially with EJ attributed to severe sepsis, Cr normalized to near 1 but is no up-trending again ~1.8. Possibly d/t CNI toxicity (Tacro level 8.2 and dose decreased 8/14) and also d/t ?ongoing infection. Nephrology consulted, agree with maintaining hydration given high ostomy outputs and close monitoring of Tacro level.     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  Nevertheless, he received a full ten day (7/25 - 8/3/17) daptomycin course for severe sepsis in the setting of colonization with VRE and was on Linezolid empirically, from 8/9-8/15.   -  Staphylococcus capitis in ascitic fluid 7/5/17:  Was culture contamination.   - Rhinovirus in BAL 7/15/2017:  Likely to be due to chronic shedding, since his main presentation was abdominal, not respiratory.    Other ID issues:  - PCP prophylaxis: TMP-SMX was held in 6/17 due to neutropenia, but resumed 8/4/17.  - Serostatus:  CMV D+/R-, EBV D+/R-, HSV1?/2?, VZV ?.  Presently on IV ganciclovir.  - Immunization status: up-to-date.  - Gamma globulin status: >1660 as of 7/24/2017  - Isolation: Contact isolation for a history of VRE carriage.    Interval History:  No acute events overnight. Went to IR for peritoneal drain placement 8/15, 60 cc fluid sent for cell studies and culture, pigtail catheter left in place draining ~500cc since placement throughout 8/15. Remains afebrile, hemodynamically stable. Continues to be encephalopathic with waxing and waning delirium, ongoing ?hallucinations. No acute complaints today.    [facility]ious [facility] Illness (copied from the 8/4/17 Transplant ID Note):   A 51 year old gentleman with PMH of DM, HTN, fibromyalgia, previous DVT with IVC filter,  s/p liver transplant secondary to ESLD due to CASTAÑEDA on 03/04/2017, CMV D+/R-, EBV D+/R-, who was admitted on 07/04 due to neutropenic enterocolitis starting empiric therapy with zosyn + flagyl + vancomycin + micafungin being transferred to the ICU, requiring exploratory laparotomy + wash out for neutropenic enterocolitis on 07/04, reporting inflamed cecum and ascending colon, having a second look on 07/05 finding improvement of the inflammation and performing closure of abdominal incision; culture resulted S. Capitis considered a contaminant. Valcyte  was stopped since admission. On 07/06, flagyl + vancomycin + micafungin were stopped and zosyn changed to ertapenem. As per ID note from 07/06, recommended to switch back ertapenem to zosyn since thrombocytopenia was seen before zosyn treatment, pancytopenia possibly related to medication (MMF, bactrim, valcyte, initially seen at the beginning of June) and recommended to monitor CMV PCR weekly ( on 07/03: positive < 137; before on 06/26 was ND). Additionally, on 07/12 a BAL was repeated and showed VRE/CONS/P putida/C. Albicans, all were considered a colonizers, and primary team continued with ertapenem, held MMF, bactrim, valcyte). On 07/13, it was recommended to re-start valcyte since his CMV PCR was 145 and counts improved. Patient persisted febrile, with evident ascitis tapped but culture resulted negative. Course complicated with thrombosis of the right arterial artery with ischemia to the tip of the ischemic finger. On 07/15, BAL was repeated and was positive for rhinovirus. Valcyte was re-initiated on 07/15 due to CMV PCR: 4225. PAtient was discharged from the ICU on 07/17. Also, 14 days of ertapenem were completed on 07/18 and EBV PCR was taken on 07/18 and resulted [id number] concerning of PTLD since patient persisted febrile with ascitis and colitis. On 07/20, ZAKIA was switched to GCV due to worsening level of 1906. Patient became encephalopathic but MRI resulted negative. On 07/21 a colonoscopy was performed and showed normal colonic mucosa; random biopsies were taken. On 07/25, patient deteriorated clinically presenting respiratory failure requiring intubation, so was transferred to ICU and started empiric treatment with meropenem + daptomycin + micafungin, and GCV dose was increased to 10 mg BID. A CT abdomen was repeated and showed retroperitoneal air so underwent EL + lysis of adhesions + right hemicolectomy + ileostomy + partial omentectomy  due to ascending colitis (no neida perforation or ischemia). On  07/27 CMV PCR resulted 290. The initial admission diagnosis was neutropenic colitis, but new primary seroconversion CMV viremia was discovered on 7/10/17.  He had persistent fevers from 7/10 - 15/17 and again from 7/21 - 8/4/17, was afebrile for three+ days, but has spiked renewed fevers (up to 100.7 degrees) since 8/7/17 late evening and is looking septic with increased malaise and abdominal pain, obtundation, increased delirium, rising inflammatory markers, and a higher peripheral WBC.  Repeat 8/9/17 abdominal CT scan showed right sided free air.  A RUQ retroperitoneal abscess drain was placed by IR on 8/9/17 and broad-spectrum antibiotic therapy resumed -- since then he has improved. He has been on empiric Zosyn plus linezolid (for his history of VRE) since 8/9/17 (as well as ongoing IV ganciclovir since 7/20/17 and TMP-SMX prophylaxis since 8/4/17).  He has subsequently been afebrile with down-trending WBC (now leukopenic). All other clinical symptoms improving (decreasing abdominal pain, slowly improving obtundation, slightly more interactive though remains disoriented. Is able to hold conversation though easily confused.  Other than 8/9/17 drainage anaerobic culture growing a Clostridium species in broth (at 48 hours incubation) all other recent culture data remains NGTD.  CT abd/pelvis 8/14 notable for new foci of intraperitoneal gas not present on CT 8/9 as well as ongoing moderate ascites and RP fluid collection with migrated drain. IR placed peritoneal drain 8/15, Linezolid discontinued 8/15.     Transplants:  3/4/2017 (Liver)    Review of Systems:  Difficult to obtain d/t patient's encephalopathy, oriented only to self.   CONSTITUTIONAL:  Afebrile past 4 days no fevers, chills, sweats  EYES: No eye pain, visual changes, or scleral icterus.  ENT:  No rhinorrhea, sinus pain, otalgia, hearing loss, tinnitus, sore throat, or oral pain.  Left ear lobe pressure ulcer.  LYMPH:  No edema.  RESPIRATORY:  No cough,  increased sputum, or dyspnea (on intermittent low flow oxygen).  CARDIOVASCULAR:  No chest pain, palpitations.  GASTROINTESTINAL:  Improved abdominal pain, Significant ascites.  S/p liver transplant.  Dysphagia.  Liquid stool in his ileostomy.  No nausea, vomiting, or constipation.  GENITOURINARY:  Improved EJ.  Crowe in place draining clear yellow urine,  HEME:  Chronic anemia. Recurrent leukopenia, No easy bruising.  ENDOCRINE:  Type 2 diabetes mellitus on insulin.  MUSCULOSKELETAL:  Necrotic toe tips.  Generally deconditioned.  Coccygeal decub.  No new focal myalgias / arthralgias.  SKIN:  No rash or pruritus.  NEURO:  A/O x1 (to self), but able to hold vague conversation about clinical status,  No headache.  PSYCH:  Negative.    Past Medical / Surgical History:  Past Medical History:   Diagnosis Date     Cancer (H)      Depressive disorder, not elsewhere classified      Esophageal reflux      Fibromyalgia 1/2009    dx with Dr Benitez( Rhe[facility])     History of thrombophlebitis      Osteoarthritis      Other acute embolism veins 11/01    Deep vein thrombophlebitis, filter placed     Other and unspecified hyperlipidemia      Other chronic nonalcoholic liver disease     Fatty liver      Other testicular hypofunction      Pneumonia, organism 10-01    Included ARDS, sepsis, and  acute renal failure; hospitalized     Rheumatoid arthritis(714.0)      Type II or unspecified type diabetes mellitus without mention of complication, not stated as uncontrolled 11/01    Managed by endocrinology     Unspecified essential hypertension 11/01    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     Unspecified sleep apnea     Uses BiPAP     Past Surgical History:   Procedure Laterality Date     BENCH LIVER N/A 3/4/2017    Procedure: BENCH LIVER;  Surgeon: Jovan Tran MD;  Location: [facility] OR     C NONSPECIFIC PROCEDURE      tracheostomy     C NONSPECIFIC PROCEDURE      repair of deviated septum     C NONSPECIFIC PROCEDURE  2007     Rt knee arthroscopy     C TOTAL KNEE ARTHROPLASTY      Right knee arthroscopy     CHOLECYSTECTOMY       COLONOSCOPY N/A 2017    Procedure: COMBINED COLONOSCOPY, SINGLE OR MULTIPLE BIOPSY/POLYPECTOMY BY BIOPSY;  Colonoscopy;  Surgeon: Izaiah Montes MD;  Location: [facility] GI     ESOPHAGOSCOPY, GASTROSCOPY, DUODENOSCOPY (EGD), COMBINED N/A 2016    Procedure: COMBINED ESOPHAGOSCOPY, GASTROSCOPY, DUODENOSCOPY (EGD), BIOPSY SINGLE OR MULTIPLE;  Surgeon: Trent Pederson MD;  Location: [facility] GI     LAPAROTOMY EXPLORATORY N/A 2017    Procedure: LAPAROTOMY EXPLORATORY;  Exploratory Laparotomy, washout;  Surgeon: Tip Zhang MD;  Location: [facility] OR     LAPAROTOMY EXPLORATORY N/A 2017    Procedure: LAPAROTOMY EXPLORATORY;  Exploratory Laparotomy, Washout with closure.;  Surgeon: Tip Zhang MD;  Location: [facility] OR     LAPAROTOMY EXPLORATORY N/A 2017    Procedure: LAPAROTOMY EXPLORATORY;  Exploratory Laparotomy, Right angelique-colectomy, end ileostomy, mucosal fistula, partial omentectomy;  Surgeon: Sara Dinh MD;  Location: [facility] OR     TRANSPLANT LIVER RECIPIENT  DONOR N/A 3/4/2017    Procedure: TRANSPLANT LIVER RECIPIENT  DONOR;  Surgeon: Jovan Tran MD;  Location: [facility] OR     Current Scheduled Medications:    amLODIPine  5 mg Oral Daily     sodium chloride (PF)  10 mL Irrigation Q8H     iohexol  50 mL Oral Once     fiber modular  1 packet Per Feeding Tube BID     sodium chloride (PF)  20 mL Irrigation Q6H     diphenoxylate-atropine  5 mL Oral 4x Daily     saccharomyces boulardii  250 mg Oral BID     sodium bicarbonate  1,300 mg Per NG tube TID     ascorbic acid  250 mg Oral Daily     sulfamethoxazole-trimethoprim  10 mL Oral Daily     ganciclovir (CYTOVENE) intermittent infusion  7.5 mg/kg Intravenous Q24H     miconazole with skin protectant   Topical BID     piperacillin-tazobactam  3.375 g Intravenous Q6H     fludrocortisone  0.1 mg Oral BID      multivitamin CF formula  5 mL Per Feeding Tube Daily     zinc sulfate  220 mg Per Feeding Tube Daily     loperamide  4 mg Oral or Feeding Tube 4x Daily     insulin aspart   Subcutaneous TID w/meals     protein modular  1 packet Per Feeding Tube TID     aspirin  80 mg Oral Daily     pantoprazole  40 mg Oral or Feeding Tube Daily     sodium chloride (PF)  3 mL Intracatheter Q8H     Physical Examination:  Vital sign ranges over the past 24 hours:  Temp:  [97.9  F (36.6  C)-98.4  F (36.9  C)] 98.4  F (36.9  C)  Pulse:  [86-90] 87  Heart Rate:  [84-91] 89  Resp:  [18-22] 22  BP: (139-169)/(74-95) 169/95  SpO2:  [94 %-98 %] 94 %    Intake/Output Summary (Last 24 hours) at 08/11/17 1705  Last data filed at 08/11/17 1700   Gross per 24 hour   Intake           2283.2 ml   Output             1705 ml   Net            578.2 ml     Physical Examination:  GENERAL:  Sleepy but arousable, able to hold general conversation but oriented only to self, 51 year old man supine in bed in NAD.  EYES:  EOMI, anicteric sclerae.  ENT:  No otorrhea.  NJ tube present.   No anterior oral lesion.  NECK:  Supple.  LYMPH:  No cervical lymphadenopathy.  LUNGS:  Few bilaterally scattered coarse rhonchi loudest in bases on anterior auscultation   CARDIOVASCULAR:  Regular rate and rhythm, tachycardia resolved, normal S1, S2, without murmur, gallop, or rub.  ABDOMEN:  Normal bowel sounds, soft, slight generalized tenderness, worst on right side, mildly distended, midline stapled wound lacks inflammation with dressed superior mucous fistula, RLQ ileostomy with liquid stool, right TABITHA drain with serous drainage, new RLQ pigtail catheter in place draining yellow fluid    :  Crowe with hardik urine.  EXTREMITIES:  Distally warm, no edema,  ischemic right hallux and right second toe, also ischemic tip of left second toe, and right index, no ulcers.  Right PICC line site lacks inflammation.  NEUROLOGIC:  Responding, oriented x 1.5, moves extremities x  4.    Inflammatory Markers    Recent Labs   Lab Test  08/09/17   0711  08/07/17   0549  08/06/17   0633  08/05/17   0616  07/05/17   1810  03/05/17   0358  11/23/16   0813  11/16/16   0706   08/15/11   1151  04/11/11   1209  01/03/11   1246  02/15/10   1232   SED   --    --    --    --    --    --   45*   --    --   11  14  17*  25*   CRP  190.0*  130.0*  130.0*  140.0*  354.0  20.0*  58.0*  59.1*   < >  11.1*  9.0*  11.7*  17.5*    < > = values in this interval not displayed.     Immune Globulin Studies     Recent Labs   Lab Test  07/24/17   0705  08/15/11   1243   IGG  1660*  1160   IGG1   --   734   IGG2   --   294   IGG3   --   7*   IGG4   --   59     Metabolic Studies       Recent Labs   Lab Test  08/16/17   0650  08/15/17   0639  08/14/17   0621  08/14/17   0020  08/13/17   0631  08/13/17   0027  08/12/17   0630  08/11/17   0650   08/01/17   0651   07/25/17   0945   07/06/17   0316   NA  142  140  143   --   142   --   143  140   < >  150*   < >  137   < >  143   POTASSIUM  4.0  4.3  4.4   --   4.5   --   4.4  4.9   < >  4.3   < >  4.0   < >  5.0   CHLORIDE  117*  115*  114*   --   116*   --   115*  114*   < >  123*   < >  104   < >  116*   CO2  16*  18*  16*   --   17*   --   22  19*   < >  20   < >  26   < >  18*   ANIONGAP  9  8  13   --   9   --   7  8   < >  7   < >  7   < >  9   BUN  65*  75*  71*   --   66*   --   68*  64*   < >  51*   < >  77*   < >  62*   CR  1.50*  1.87*  1.92*   --   1.94*   --   1.82*  1.82*   < >  0.90   < >  2.60*   < >  2.75*   GFRESTIMATED  49*  38*  37*   --   36*   --   39*  39*   < >  89   < >  26*   < >  24*   GLC  129*  112*  133*   --   113*   --   147*  112*   < >  141*   < >  382*   < >  139*   A1C   --    --    --    --    --    --    --    --    --    --    --    --    --   Canceled, Test credited   Below Assay Range  NOTIFIED LEONARD ONEILL AT 0538 ON 7/6/17 BY EAANTOLIN     JACOB  8.3*  8.1*  8.4*   --   8.4*   --   8.6  8.5   < >  8.1*   < >  7.6*   < >  6.9*   PHOS  4.4   4.0  5.1*   --   5.6*   --   5.7*  5.4*   < >  3.1   < >   --    < >  5.5*   MAG  1.8  1.9  2.1   --   2.1   --   2.1  2.0   < >  1.9   < >   --    < >  2.1   LACT   --    --    --   0.7   --   1.0   --    --    < >   --    < >   --    < >  1.5   CKT   --    --    --    --    --    --    --    --    --   16*   --   40   --    --     < > = values in this interval not displayed.     Hepatic Studies    Recent Labs   Lab Test  08/14/17   0621  08/10/17   1323  08/10/17   0704  08/08/17   0600  08/02/17   0543  07/27/17   0410  07/25/17   1651   07/25/17   0017   07/11/17   0328   BILITOTAL  0.7   --   2.2*  0.5  0.4  1.3  0.6   < >   --    < >  0.5   ALKPHOS  271*   --   136  172*  199*  143  242*   < >   --    < >  158*   ALBUMIN  1.8*   --   1.9*  2.1*  2.3*  2.4*  2.0*   < >   --    < >  1.8*   AST  24   --   36  28  62*  27  61*   < >   --    < >  34   ALT  17   --   26  25  42  27  50   < >   --    < >  27   LDH   --   168   --    --    --    --    --    --   258*   --    --    GGT   --    --    --    --    --    --    --    --    --    --   58    < > = values in this interval not displayed.     Pancreatitis testing    Recent Labs   Lab Test  07/24/17   0705  07/17/17   0630  07/12/17   0342  07/10/17   0340  07/05/17   0346  03/05/17   0358  03/03/17   1458  02/21/17   1520  12/09/16   1159  02/08/16   1144   09/30/10   1734   AMYLASE   --    --    --    --    --   53  70   --    --    --    --   82   LIPASE   --    --    --    --    --   216   --   326  161   --    --   138   TRIG  303*  168*  114  177*  174*   --    --    --    --   99   < >   --     < > = values in this interval not displayed.     Hematology Studies      Recent Labs   Lab Test  08/16/17   0650  08/15/17   0639  08/14/17   0621  08/13/17   0631  08/12/17   0630  08/11/17   0650   08/07/17   0549   08/06/17   0633   08/05/17   0616  08/04/17   0548  08/03/17   0533   WBC  2.0*  2.7*  3.4*  3.3*  2.8*  4.3   < >  5.5   --   5.2   --   6.3  6.9   5.1   ANEU   --   1.6   --    --    --    --    --   3.7   --   3.5   --   3.9  4.1  2.8   ALYM   --   0.7*   --    --    --    --    --   1.5   --   1.5   --   2.2  2.5  1.9   ZINA   --   0.3   --    --    --    --    --   0.2   --   0.2   --   0.2  0.3  0.3   AEOS   --   0.0   --    --    --    --    --   0.1   --   0.0   --   0.0  0.0  0.0   HGB  6.9*  7.4*  8.1*  8.4*  7.9*  7.9*   < >  7.7*   < >  7.6*   < >  6.9*  7.8*  7.3*   HCT  21.2*  23.3*  25.2*  26.4*  24.5*  24.3*   < >  23.7*   --   23.2*   --   20.9*  24.3*  23.5*   PLT  102*  118*  129*  156  132*  140*   < >  116*   --   94*   --   94*  99*  92*    < > = values in this interval not displayed.     Arterial Blood Gas Testing    Recent Labs   Lab Test  08/10/17   1515  08/02/17   1216  07/26/17   2243  07/26/17   2133   07/26/17 2017 07/25/17   1746   PH  7.33*  7.37  Incorrect specimen type  NOTTIED BY WOJCIECH DEWITT, NEW ORDER TO REPLACE.7/26/17 AT 2346 BY ZAINAB.  CORRECTED ON 07/26 AT 2350: PREVIOUSLY REPORTED AS 7.27     --    --   Canceled, Test credited   Incorrect specimen type    7.32*   PCO2  34*  31*  Incorrect specimen type  NOTTIED BY WOJCIECH DEWITT NEW ORDER TO REPLACE.7/26/17 AT 2346 BY ZAINAB.  CORRECTED ON 07/26 AT 2350: PREVIOUSLY REPORTED AS 45     --    --   Canceled, Test credited   Incorrect specimen type    42   PO2  68*  69*  Incorrect specimen type  NOTTIED BY WOJCIECH DEWITT, NEW ORDER TO REPLACE.7/26/17 AT 2346 BY ZAINAB.  CORRECTED ON 07/26 AT 2350: PREVIOUSLY REPORTED AS 53     --    --   Canceled, Test credited   Incorrect specimen type    81   HCO3  18*  18*  Incorrect specimen type  NOTTIED BY WOJCIECH DEWITT, NEW ORDER TO REPLACE.7/26/17 AT 2346 BY ZAINAB.  CORRECTED ON 07/26 AT 2350: PREVIOUSLY REPORTED AS 20     --    --   Canceled, Test credited   Incorrect specimen type    22   O2PER  2L  21  Incorrect specimen type  NOTTIED BY WOJCIECH DEWITT, NEW ORDER TO REPLACE.7/26/17 AT 2346 BY ZAINAB.  CORRECTED ON 07/26 AT 2350: PREVIOUSLY  REPORTED AS 40    40  62   < >  100.0  100  40.0    < > = values in this interval not displayed.     Urine Studies     Recent Labs   Lab Test  08/10/17   1752  08/08/17   1600  08/05/17   0617  07/28/17   1042  07/25/17   1205   URINEPH  5.0  5.0  5.0  5.0  5.0   NITRITE  Negative  Negative  Negative  Negative  Negative   LEUKEST  Negative  Negative  Negative  Negative  Trace*   WBCU  10*  7*  5*  6*  5*     Vancomycin Levels     Recent Labs   Lab Test  07/06/17   0316  10/28/16   1405   VANCOMYCIN  22.1  14.4     Procalcitonins:  8/12 9.21  8/9 7.55  8/8 3.11  8/5 1.06  8/2 0.78    Microbiology:  8/15 Ascites fluid cultures: pending   8.15 Ascites Fluid: WBC 68030, PMNs 92%, Glucose 15, LDh 2669  8/11 Ascites cx:  NGTD.  Gram stain:  NOS, many WBCs (predominately PMNs).  Anaerobic / fungal cxs NGTD.  Fluid analysis:  Yellow, cloudy, WBC 5,038 (83% N, 1% L, 16% M), protein 4.0, , glucose 5, amylase 19, bilirubin 1.2.  8/11 Ur cx NGTD  8/9 Right retroperitoneal abscess drainage aerobic cx:  NGTD.  Gram stain:  NOS, many WBCs (predominately PMNs).  Anaerobic cx:  Clostridium sps isolated in broth at 48 hours of incubation.  8/9 x 2, 8/4 x 2, 8/2 x 2, 7/31 x 2, 7/28 x 2, 7/25 x 2, 7/15 Bld cxs NGTD / negative  8/5 ascites aerobic / anaerobic / fungal cxs NGTD.  Gram stain:  NOS, few WBCs (predominately PMNs), moderate RBCs.  Fluid analysis:  Yellow, slightly cloudy,  (91%N, 6% L, 3% M), albumin 1.6, protein 3.8.  8/5, 7/25 Ur cxs:  Negative  8/5 Serum CRAG negative  7/29 Sp cx:  Normal elvie, heavy C albicans  7/26 Ascites (OR) cx:  Negative.   Gram stain:  NOS, few WBCs.  7/25 Ascites cx: Negative.  Gram stain:  NOS, no WBCs  7/15 BAL fluid studies:  Yeast and Rhinovirus  7/12 BAL fluid studies:  Single colony of VRE, C albicans/dubliniensis   7/11 Sp cx:  VRE and Coag Neg Staph   7/5 Ascites cx:  S capitis    CMV viral loads    Recent Labs   Lab Test  08/10/17   0704  08/03/17   0533  07/27/17   1101   07/20/17   1427  07/18/17   0530   CSPEC  EDTA PLASMA  CORRECTED ON 08/11 AT 0714: PREVIOUSLY REPORTED AS Whole Blood    EDTA PLASMA  EDTA PLASMA  CORRECTED ON 07/28 AT 0840: PREVIOUSLY REPORTED AS Blood    Plasma  Plasma   CMVLOG  <2.1  <2.1  2.5*  3.3*  3.0*     CMV viral loads    Log IU/mL of CMVQNT   Date Value Ref Range Status   08/10/2017 <2.1 <2.1 [Log_IU]/mL Final   08/03/2017 <2.1 <2.1 [Log_IU]/mL Final   07/27/2017 2.5 (H) <2.1 [Log_IU]/mL Final   07/20/2017 3.3 (H) <2.1 [Log_IU]/mL Final   07/18/2017 3.0 (H) <2.1 [Log_IU]/mL Final   07/15/2017 3.6 (H) <2.1 [Log_IU]/mL Final   07/10/2017 2.2 (H) <2.1 [Log_IU]/mL Final   07/03/2017 <2.1 <2.1 [Log_IU]/mL Final   06/26/2017 Not Calculated <2.1 [Log_IU]/mL Final     CMV resistance testing    EBV DNA Copies/mL   Date Value Ref Range Status   08/01/2017 6864 (A) EBVNEG [Copies]/mL Final   07/18/2017 004817 (A) EBVNEG [Copies]/mL Final     Pathology:   Colectomy path 7/26/17  - Colonic wall with perforation, edema, and acute serositis   - Background colonic mucosa with no significant histologic abnormality   - CMV immunostain is positive in rare (3) cells   - Terminal ileum with no significant histologic abnormality   - Viable, unremarkable surgical margins   - One benign lymph node (0/1)   - Appendix with fibrous obliteration of the tip     Colon biopsies 7/21/2017   A: Colon biopsy, ascending   B: Colon biopsy, descending   FINAL DIAGNOSIS:   A. COLON BIOPSY, ASCENDING:   - Colonic mucosa with no significant histologic abnormality   - Negative for intraepithelial lymphocytosis or deposition of   subepithelial thick collagenous band   B. COLON BIOPSY, DESCENDING:   - Crypt architecture distortion, consistent with healed prior injury   - Negative for active inflammation or dysplasia   - Negative for intraepithelial lymphocytosis or deposition of   subepithelial thick collagenous band     Cytology of ascitic fluid 7/25/2017   PERITONEAL FLUID, ASCITES:   -Negative  for malignancy   Cytology of ascitic fluid 2017.   Peritoneal fluid, ascites:   - Negative for malignancy   - Acute and chronic inflammation present   Ascites fluid:  Rare to absent viable B cells.  (This specimen was collected on 17 but was not ordered/delivered to   lab/run until 17 - results should be interpreted with caution due   to low cellularity/viability.   Due to limited cellularity we were only able to analyze the B cells.   There is no immunophenotypic evidence of B cell non-Hodgkin lymphoma.   Neoplastic cells, including large cells, may not survive specimen   processing. This sample may not be representative. Final interpretation   requires correlation with morphologic and clinical features.)    Imagin/15: IR RLQ 12 Fr pigtail drain placed in intraperitoneum, 60 cc fluid sent for labs and culture    CT abd/pelvis: moderate volume ascites stable with increased foci of intra-peritonal gas, thickened peritoneum c/w peritonitis, complex right RP gas collection stable to slightly decrease since , stable loculated bibasilar pleural effusions and bibasilar opacities, postsurgical changes of R hemicolectomy and several loops borderline dilated bowel    Paracentesis performed by IR.  8/10 CXRs:  Right PICC.  Increasing pulmonary edema.  Left retrocardiac and basilar opacities, atelectasis and/or infection.  Small right pleural effusion.  8/10 Abdm U/S:  Small to moderate volume complex ascites visualized, greatest in the lower quadrants, left greater than right. Right retroperitoneal air again noted.  Right-sided pleural effusion.   CT-guided retroperitoneal abscess drainage performed by IR.   Chest / abdm CT:  Unchanged right retroperitoneal air with new foci of free intraperitoneal air in the right upper quadrant. No extravasation of oral contrast identified.  No significant change in large volume abdominal ascites, thickened peritoneum and diffuse bowel wall thickening  concerning for peritonitis.  Small right and trace left pleural effusions with overlying atelectasis and patchy consolidations. Overall, the consolidations are slightly increased since the prior exam. Differential includes atelectasis or infection.  Secondary signs of portal hypertension similar to the prior exam.  Unchanged mild bilateral renal pelvocaliectasis.  8/8 CXR:  Increased bibasilar streaky opacities, compatible with atelectasis and/or infection.  Probable small bilateral pleural effusions.  Persistent low lung volumes.  8/5 Ultrasound-guided paracentesis performed by IR:  250 ccyellow-brown fluid sent for analysis.  8/4 Abdm CT:  Heterogenous area of right-sided retroperitoneal gas has mildly decreased in size (versus 7/25/17 scan).  Moderate to severe ascites with diffuse peritoneal enhancement.  This may represent diffuse infection, for example bacterial peritonitis.  Mild residual foci of free air in the abdomen is likely postsurgical.  Diffuse small bowel and colonic wall thickening, similar to prior, likely reactive to ascites/peritonitis.  Findings of portal hypertension including splenomegaly, ascites, esophageal and upper abdominal varices.  Small right pleural effusion with associated atelectasis. Improved left pleural effusion and basilar consolidation.  8/4 CXR:  Decreased lung volumes with improving perihilar and bibasilar opacities, which may represent atelectasis, pulmonary edema, or infection.  8/2 CXR: Decreased lung volumes with mildly increased perihilar and bibasilar opacities, which may represent pulmonary edema, atelectasis, or infection.  Small right pleural effusion.  8/2 BLE U/S:  Right leg: Normal RONAL. Mildly Abnormal TBI, suggestive of small vessel disease. Patent right lower extremity arteries without evidence of hemodynamically significant stenosis.  Left leg: Normal RONAL. Abnormal TBI, suggestive of small vessel disease. Patent left lower extremity arteries without evidence of  hemodynamically significant stenosis.  7/31 CXR:  Improving perihilar and bibasilar opacities likely represent infection/aspiration, atelectasis, or pulmonary edema.  Moderate opacities bilaterally persist.  Stable small to moderate pleural effusions bilaterally.  7/28 CXR:  Interval removal of the endotracheal tube.  Increased perihilar opacities, worsening infection versus pulmonary edema.  Increased bilateral moderate pleural effusions with overlying atelectasis/consolidation.  7/25 Chest / abdm CT:  New large heterogeneous area of right-sided retroperitoneal gas which is highly concerning for an infectious process.  Given the history of prior neutropenic colitis and biopsies, there could also be a component of stool from a ruptured viscus, despite the lack of  surrounding bowel loops and no extraluminal oral contrast. Surgical consultation is recommended. Bibasilar atelectasis versus consolidation with small bilateral pleural effusions.  Extensive mesenteric and soft tissue edema with large volume ascites. Numerous mesenteric and retroperitoneal lymph nodes which are presumably reactive.  Postsurgical changes of liver transplant.  7/20 Brain MRI:  Significant image degradation due to motion artifact, with no definite acute intracranial pathology. No abnormal contrast enhancing intracranial lesions.  Mucosal thickening in the sphenoid and maxillary sinuses and left greater than right mastoid fluid are nonspecific in the setting of recent intubation.  7/13 Chest / abdm CT:  Improved moderate right-sided pleural effusion and resolution of left-sided pleural effusion. There remain large areas of atelectasis/consolidation in both lungs, including in the left lower lobe despite resolution of left-sided pleural effusion. Superimposed infectious process cannot be excluded.  Moderate-large amount of ascites increased from 7/8/2017, which makes it difficult to evaluate for the presence or absence of inflammatory change or  infectious process in the abdomen or pelvis. No intra-abdominal abscess.  Stable small presumed hematoma within the ventral abdominal wall fat.  Splenomegaly.

## 2017-08-16 NOTE — PLAN OF CARE
Problem: Goal Outcome Summary  Goal: Goal Outcome Summary  Outcome: No Change  Hypertensive 160-170's/80-90's, OVSS. Oriented to self and place but not time. Did become more confused later morning and hallucinating calling for his dog who he thought was in the room and pointing out things on the ceiling asking what they were. -183, currently increased to algorythm 2, more insulin needs once TF's were restarted at 1030. Hgb 6.9, received one unit of RBC's. Prograf level 33 with morning labs, prograf gtt was stopped upon receiving the call from lab with the critical prograf. Prograf redrawn via peripheral stick, gtt restarted at 2ml/hour. Abdominal drain X 2, amylase and lipase fluid levels sent from drain. MD waiting on these levels to determine if Camacho can eat and drink, currently NPO. Voiding in urinal X 2, ostomy with medium amt of liquid stool, continues with scheduled immodium and lomotil. Ambulated with OT and sat in chair for approx 30mins, requested pain meds with activity, oxycodone 5mg dose given once.

## 2017-08-16 NOTE — PROGRESS NOTES
Colorectal Surgery Progress Note  POD#43      Subjective:  Pt denies pain.      Vitals:  Vitals:    08/16/17 0746 08/16/17 1006 08/16/17 1028 08/16/17 1130   BP: (!) 169/95 164/89 (!) 166/94 (!) 170/92   BP Location:  Left arm Left arm    Pulse:  88     Resp: 22 22 22 20   Temp: 98.4  F (36.9  C) 97.7  F (36.5  C) 97.7  F (36.5  C) 98.4  F (36.9  C)   TempSrc:  Oral Oral Oral   SpO2: 94% 95% 95% 95%   Weight:       Height:         I/O:  I/O last 3 completed shifts:  In: 2574.17 [P.O.:400; I.V.:1764.17; Other:90; NG/GT:60]  Out: 1905 [Urine:150; Drains:830; Stool:925]    Physical Exam:  Gen: Awake, alert.  NAD.    Pulm: Non-labored breathing  Abd: Soft, protuberant with ascites, non- tender, no guarding/rebound   Incision C/D/I with staples in place.  Mucus fistula at superior aspect of incision.  Covered.    Stoma pink and viable with stool and gas in bag  Ext:  Warm and well-perfused    BMP    Recent Labs  Lab 08/16/17  0650 08/15/17  0639 08/14/17  0621 08/13/17  0631    140 143 142   POTASSIUM 4.0 4.3 4.4 4.5   CHLORIDE 117* 115* 114* 116*   CO2 16* 18* 16* 17*   BUN 65* 75* 71* 66*   CR 1.50* 1.87* 1.92* 1.94*   * 112* 133* 113*   MAG 1.8 1.9 2.1 2.1   PHOS 4.4 4.0 5.1* 5.6*     CBC    Recent Labs  Lab 08/16/17  0650 08/15/17  0639 08/14/17  0621 08/13/17  0631   WBC 2.0* 2.7* 3.4* 3.3*   HGB 6.9* 7.4* 8.1* 8.4*   HCT 21.2* 23.3* 25.2* 26.4*   * 118* 129* 156         ASSESSMENT: This is a 51 year old male s/p liver transplant 3/2017. S/p ex lap with wash out on 7/4/2017 and then subsequent ex lap, YA, right hemicolectomy, end ileostomy, mucous fistula, partial omentectomy on 7/26/2017.    - no new recommendations  - ileo outputs improved.  Continue imodium 4mg QID, lomotil QID, fiber BID.  Goal ileo output is ~1200ml/day.  Can further uptitrate lomotil and fiber as needed.  If maxed on imodium, lomotil, fiber, then next could consider tincture.   - call/page for questions.     Iqra  COLETTE Acosta   Colon and Rectal Surgery  [phone]     Patient was seen and discussed with Dr. Lynch.

## 2017-08-17 ENCOUNTER — APPOINTMENT (OUTPATIENT)
Dept: PHYSICAL THERAPY | Facility: CLINIC | Age: 52
End: 2017-08-17
Attending: TRANSPLANT SURGERY
Payer: COMMERCIAL

## 2017-08-17 ENCOUNTER — APPOINTMENT (OUTPATIENT)
Dept: SPEECH THERAPY | Facility: CLINIC | Age: 52
End: 2017-08-17
Attending: TRANSPLANT SURGERY
Payer: COMMERCIAL

## 2017-08-17 LAB
ALBUMIN SERPL-MCNC: 1.7 G/DL (ref 3.4–5)
ALP SERPL-CCNC: 254 U/L (ref 40–150)
ALT SERPL W P-5'-P-CCNC: 21 U/L (ref 0–70)
ANION GAP SERPL CALCULATED.3IONS-SCNC: 8 MMOL/L (ref 3–14)
AST SERPL W P-5'-P-CCNC: 33 U/L (ref 0–45)
BASOPHILS # BLD AUTO: 0 10E9/L (ref 0–0.2)
BASOPHILS NFR BLD AUTO: 0.5 %
BILIRUB DIRECT SERPL-MCNC: 0.3 MG/DL (ref 0–0.2)
BILIRUB SERPL-MCNC: 0.6 MG/DL (ref 0.2–1.3)
BUN SERPL-MCNC: 61 MG/DL (ref 7–30)
CALCIUM SERPL-MCNC: 8.2 MG/DL (ref 8.5–10.1)
CHLORIDE SERPL-SCNC: 116 MMOL/L (ref 94–109)
CMV DNA SPEC NAA+PROBE-ACNC: NORMAL [IU]/ML
CMV DNA SPEC NAA+PROBE-LOG#: NORMAL {LOG_IU}/ML
CO2 SERPL-SCNC: 18 MMOL/L (ref 20–32)
CREAT SERPL-MCNC: 1.23 MG/DL (ref 0.66–1.25)
DIFFERENTIAL METHOD BLD: ABNORMAL
EOSINOPHIL # BLD AUTO: 0 10E9/L (ref 0–0.7)
EOSINOPHIL NFR BLD AUTO: 0 %
ERYTHROCYTE [DISTWIDTH] IN BLOOD BY AUTOMATED COUNT: 17.2 % (ref 10–15)
GFR SERPL CREATININE-BSD FRML MDRD: 62 ML/MIN/1.7M2
GLUCOSE BLDC GLUCOMTR-MCNC: 115 MG/DL (ref 70–99)
GLUCOSE BLDC GLUCOMTR-MCNC: 119 MG/DL (ref 70–99)
GLUCOSE BLDC GLUCOMTR-MCNC: 127 MG/DL (ref 70–99)
GLUCOSE BLDC GLUCOMTR-MCNC: 129 MG/DL (ref 70–99)
GLUCOSE BLDC GLUCOMTR-MCNC: 131 MG/DL (ref 70–99)
GLUCOSE BLDC GLUCOMTR-MCNC: 131 MG/DL (ref 70–99)
GLUCOSE BLDC GLUCOMTR-MCNC: 133 MG/DL (ref 70–99)
GLUCOSE BLDC GLUCOMTR-MCNC: 136 MG/DL (ref 70–99)
GLUCOSE BLDC GLUCOMTR-MCNC: 137 MG/DL (ref 70–99)
GLUCOSE BLDC GLUCOMTR-MCNC: 137 MG/DL (ref 70–99)
GLUCOSE BLDC GLUCOMTR-MCNC: 141 MG/DL (ref 70–99)
GLUCOSE BLDC GLUCOMTR-MCNC: 144 MG/DL (ref 70–99)
GLUCOSE BLDC GLUCOMTR-MCNC: 145 MG/DL (ref 70–99)
GLUCOSE BLDC GLUCOMTR-MCNC: 146 MG/DL (ref 70–99)
GLUCOSE BLDC GLUCOMTR-MCNC: 154 MG/DL (ref 70–99)
GLUCOSE BLDC GLUCOMTR-MCNC: 156 MG/DL (ref 70–99)
GLUCOSE BLDC GLUCOMTR-MCNC: 156 MG/DL (ref 70–99)
GLUCOSE BLDC GLUCOMTR-MCNC: 169 MG/DL (ref 70–99)
GLUCOSE BLDC GLUCOMTR-MCNC: 191 MG/DL (ref 70–99)
GLUCOSE BLDC GLUCOMTR-MCNC: 192 MG/DL (ref 70–99)
GLUCOSE SERPL-MCNC: 128 MG/DL (ref 70–99)
HCT VFR BLD AUTO: 22.8 % (ref 40–53)
HGB BLD-MCNC: 7.3 G/DL (ref 13.3–17.7)
IMM GRANULOCYTES # BLD: 0 10E9/L (ref 0–0.4)
IMM GRANULOCYTES NFR BLD: 1.6 %
LACTATE BLD-SCNC: 1.2 MMOL/L (ref 0.7–2.1)
LYMPHOCYTES # BLD AUTO: 0.6 10E9/L (ref 0.8–5.3)
LYMPHOCYTES NFR BLD AUTO: 29.2 %
MAGNESIUM SERPL-MCNC: 1.9 MG/DL (ref 1.6–2.3)
MCH RBC QN AUTO: 28.4 PG (ref 26.5–33)
MCHC RBC AUTO-ENTMCNC: 32 G/DL (ref 31.5–36.5)
MCV RBC AUTO: 89 FL (ref 78–100)
MONOCYTES # BLD AUTO: 0.3 10E9/L (ref 0–1.3)
MONOCYTES NFR BLD AUTO: 13 %
NEUTROPHILS # BLD AUTO: 1.1 10E9/L (ref 1.6–8.3)
NEUTROPHILS NFR BLD AUTO: 55.7 %
PHOSPHATE SERPL-MCNC: 3.9 MG/DL (ref 2.5–4.5)
PLATELET # BLD AUTO: 107 10E9/L (ref 150–450)
POTASSIUM SERPL-SCNC: 4 MMOL/L (ref 3.4–5.3)
PROT SERPL-MCNC: 6.1 G/DL (ref 6.8–8.8)
RBC # BLD AUTO: 2.57 10E12/L (ref 4.4–5.9)
SODIUM SERPL-SCNC: 142 MMOL/L (ref 133–144)
SPECIMEN SOURCE: NORMAL
TACROLIMUS BLD-MCNC: <3 UG/L (ref 5–15)
TME LAST DOSE: ABNORMAL H
WBC # BLD AUTO: 1.9 10E9/L (ref 4–11)

## 2017-08-17 PROCEDURE — 94642 AEROSOL INHALATION TREATMENT: CPT

## 2017-08-17 PROCEDURE — 99214 OFFICE O/P EST MOD 30 MIN: CPT

## 2017-08-17 PROCEDURE — 36415 COLL VENOUS BLD VENIPUNCTURE: CPT | Performed by: STUDENT IN AN ORGANIZED HEALTH CARE EDUCATION/TRAINING PROGRAM

## 2017-08-17 PROCEDURE — 25000125 ZZHC RX 250: Performed by: PHYSICIAN ASSISTANT

## 2017-08-17 PROCEDURE — 97530 THERAPEUTIC ACTIVITIES: CPT | Mod: GP

## 2017-08-17 PROCEDURE — 12000024 ZZH R&B TRANSPLANT CRITICAL

## 2017-08-17 PROCEDURE — 25000128 H RX IP 250 OP 636: Performed by: NURSE PRACTITIONER

## 2017-08-17 PROCEDURE — 25000132 ZZH RX MED GY IP 250 OP 250 PS 637: Performed by: PHYSICIAN ASSISTANT

## 2017-08-17 PROCEDURE — 83605 ASSAY OF LACTIC ACID: CPT | Performed by: TRANSPLANT SURGERY

## 2017-08-17 PROCEDURE — 40000225 ZZH STATISTIC SLP WARD VISIT

## 2017-08-17 PROCEDURE — 85004 AUTOMATED DIFF WBC COUNT: CPT | Performed by: STUDENT IN AN ORGANIZED HEALTH CARE EDUCATION/TRAINING PROGRAM

## 2017-08-17 PROCEDURE — 00000146 ZZHCL STATISTIC GLUCOSE BY METER IP

## 2017-08-17 PROCEDURE — 25000132 ZZH RX MED GY IP 250 OP 250 PS 637: Performed by: TRANSPLANT SURGERY

## 2017-08-17 PROCEDURE — 25000128 H RX IP 250 OP 636: Performed by: PHYSICIAN ASSISTANT

## 2017-08-17 PROCEDURE — 83735 ASSAY OF MAGNESIUM: CPT | Performed by: STUDENT IN AN ORGANIZED HEALTH CARE EDUCATION/TRAINING PROGRAM

## 2017-08-17 PROCEDURE — 27210913 ZZH NUTRITION PRODUCT INTERMEDIATE PACKET

## 2017-08-17 PROCEDURE — 25000132 ZZH RX MED GY IP 250 OP 250 PS 637: Performed by: COLON & RECTAL SURGERY

## 2017-08-17 PROCEDURE — 40000802 ZZH SITE CHECK

## 2017-08-17 PROCEDURE — 25000132 ZZH RX MED GY IP 250 OP 250 PS 637: Performed by: STUDENT IN AN ORGANIZED HEALTH CARE EDUCATION/TRAINING PROGRAM

## 2017-08-17 PROCEDURE — 84100 ASSAY OF PHOSPHORUS: CPT | Performed by: STUDENT IN AN ORGANIZED HEALTH CARE EDUCATION/TRAINING PROGRAM

## 2017-08-17 PROCEDURE — 40000193 ZZH STATISTIC PT WARD VISIT

## 2017-08-17 PROCEDURE — 80076 HEPATIC FUNCTION PANEL: CPT | Performed by: STUDENT IN AN ORGANIZED HEALTH CARE EDUCATION/TRAINING PROGRAM

## 2017-08-17 PROCEDURE — 27210432 ZZH NUTRITION PRODUCT RENAL BASIC LITER

## 2017-08-17 PROCEDURE — 85027 COMPLETE CBC AUTOMATED: CPT | Performed by: STUDENT IN AN ORGANIZED HEALTH CARE EDUCATION/TRAINING PROGRAM

## 2017-08-17 PROCEDURE — 25000131 ZZH RX MED GY IP 250 OP 636 PS 637: Performed by: PHYSICIAN ASSISTANT

## 2017-08-17 PROCEDURE — 40000558 ZZH STATISTIC CVC DRESSING CHANGE

## 2017-08-17 PROCEDURE — 25000132 ZZH RX MED GY IP 250 OP 250 PS 637: Performed by: SURGERY

## 2017-08-17 PROCEDURE — 80197 ASSAY OF TACROLIMUS: CPT | Performed by: STUDENT IN AN ORGANIZED HEALTH CARE EDUCATION/TRAINING PROGRAM

## 2017-08-17 PROCEDURE — 97116 GAIT TRAINING THERAPY: CPT | Mod: GP

## 2017-08-17 PROCEDURE — 36592 COLLECT BLOOD FROM PICC: CPT | Performed by: TRANSPLANT SURGERY

## 2017-08-17 PROCEDURE — 80048 BASIC METABOLIC PNL TOTAL CA: CPT | Performed by: STUDENT IN AN ORGANIZED HEALTH CARE EDUCATION/TRAINING PROGRAM

## 2017-08-17 PROCEDURE — 92526 ORAL FUNCTION THERAPY: CPT | Mod: GN

## 2017-08-17 RX ORDER — PENTAMIDINE ISETHIONATE 300 MG/300MG
300 INHALANT RESPIRATORY (INHALATION) ONCE
Status: COMPLETED | OUTPATIENT
Start: 2017-08-17 | End: 2017-08-17

## 2017-08-17 RX ORDER — TACROLIMUS 1 MG/1
4 CAPSULE ORAL
Status: DISCONTINUED | OUTPATIENT
Start: 2017-08-17 | End: 2017-08-19

## 2017-08-17 RX ORDER — FLUDROCORTISONE ACETATE 0.1 MG/1
0.1 TABLET ORAL DAILY
Status: DISCONTINUED | OUTPATIENT
Start: 2017-08-17 | End: 2017-09-08 | Stop reason: HOSPADM

## 2017-08-17 RX ORDER — ALBUTEROL SULFATE 0.83 MG/ML
2.5 SOLUTION RESPIRATORY (INHALATION) ONCE
Status: COMPLETED | OUTPATIENT
Start: 2017-08-17 | End: 2017-08-17

## 2017-08-17 RX ORDER — SULFAMETHOXAZOLE AND TRIMETHOPRIM 200; 40 MG/5ML; MG/5ML
10 SUSPENSION ORAL
Status: DISCONTINUED | OUTPATIENT
Start: 2017-08-17 | End: 2017-08-18

## 2017-08-17 RX ADMIN — PANTOPRAZOLE SODIUM 40 MG: 40 TABLET, DELAYED RELEASE ORAL at 08:11

## 2017-08-17 RX ADMIN — GANCICLOVIR SODIUM 650 MG: 500 INJECTION, POWDER, LYOPHILIZED, FOR SOLUTION INTRAVENOUS at 16:50

## 2017-08-17 RX ADMIN — Medication 5 ML: at 20:08

## 2017-08-17 RX ADMIN — ACETAMINOPHEN 650 MG: 325 SOLUTION ORAL at 16:44

## 2017-08-17 RX ADMIN — Medication 1 PACKET: at 20:10

## 2017-08-17 RX ADMIN — ACETAMINOPHEN 650 MG: 325 SOLUTION ORAL at 09:26

## 2017-08-17 RX ADMIN — PIPERACILLIN AND TAZOBACTAM 3.38 G: 3; .375 INJECTION, POWDER, LYOPHILIZED, FOR SOLUTION INTRAVENOUS; PARENTERAL at 08:21

## 2017-08-17 RX ADMIN — Medication 5 ML: at 16:15

## 2017-08-17 RX ADMIN — Medication 1 PACKET: at 08:15

## 2017-08-17 RX ADMIN — Medication 250 MG: at 08:21

## 2017-08-17 RX ADMIN — Medication 5 ML: at 09:26

## 2017-08-17 RX ADMIN — SODIUM BICARBONATE 650 MG TABLET 1300 MG: at 08:22

## 2017-08-17 RX ADMIN — PIPERACILLIN AND TAZOBACTAM 3.38 G: 3; .375 INJECTION, POWDER, LYOPHILIZED, FOR SOLUTION INTRAVENOUS; PARENTERAL at 02:55

## 2017-08-17 RX ADMIN — INSULIN ASPART 4 UNITS: 100 INJECTION, SOLUTION INTRAVENOUS; SUBCUTANEOUS at 13:33

## 2017-08-17 RX ADMIN — SULFAMETHOXAZOLE AND TRIMETHOPRIM 80 MG: 200; 40 SUSPENSION ORAL at 08:11

## 2017-08-17 RX ADMIN — TACROLIMUS 40 MCG/HR: 5 INJECTION, SOLUTION INTRAVENOUS at 00:57

## 2017-08-17 RX ADMIN — MICONAZOLE NITRATE: 20 CREAM TOPICAL at 21:24

## 2017-08-17 RX ADMIN — LOPERAMIDE HYDROCHLORIDE 4 MG: 2 SOLUTION ORAL at 16:15

## 2017-08-17 RX ADMIN — AMLODIPINE BESYLATE 5 MG: 10 TABLET ORAL at 08:12

## 2017-08-17 RX ADMIN — MICONAZOLE NITRATE: 20 CREAM TOPICAL at 09:35

## 2017-08-17 RX ADMIN — LOPERAMIDE HYDROCHLORIDE 4 MG: 2 SOLUTION ORAL at 20:08

## 2017-08-17 RX ADMIN — PIPERACILLIN AND TAZOBACTAM 3.38 G: 3; .375 INJECTION, POWDER, LYOPHILIZED, FOR SOLUTION INTRAVENOUS; PARENTERAL at 20:08

## 2017-08-17 RX ADMIN — FLUDROCORTISONE ACETATE 0.1 MG: 0.1 TABLET ORAL at 08:23

## 2017-08-17 RX ADMIN — TACROLIMUS 4 MG: 1 CAPSULE ORAL at 18:33

## 2017-08-17 RX ADMIN — PIPERACILLIN AND TAZOBACTAM 3.38 G: 3; .375 INJECTION, POWDER, LYOPHILIZED, FOR SOLUTION INTRAVENOUS; PARENTERAL at 14:17

## 2017-08-17 RX ADMIN — Medication 1 PACKET: at 14:11

## 2017-08-17 RX ADMIN — LOPERAMIDE HYDROCHLORIDE 4 MG: 2 SOLUTION ORAL at 08:12

## 2017-08-17 RX ADMIN — Medication 220 MG: at 08:11

## 2017-08-17 RX ADMIN — Medication 5 ML: at 08:10

## 2017-08-17 RX ADMIN — Medication 250 MG: at 20:08

## 2017-08-17 RX ADMIN — SODIUM BICARBONATE 650 MG TABLET 1300 MG: at 20:08

## 2017-08-17 RX ADMIN — Medication 80 MG: at 08:10

## 2017-08-17 RX ADMIN — SODIUM BICARBONATE 650 MG TABLET 1300 MG: at 14:09

## 2017-08-17 RX ADMIN — Medication 5 ML: at 12:25

## 2017-08-17 RX ADMIN — LOPERAMIDE HYDROCHLORIDE 4 MG: 2 SOLUTION ORAL at 12:25

## 2017-08-17 RX ADMIN — ALBUTEROL SULFATE 2.5 MG: 2.5 SOLUTION RESPIRATORY (INHALATION) at 17:14

## 2017-08-17 RX ADMIN — Medication 1 PACKET: at 08:23

## 2017-08-17 RX ADMIN — HUMAN INSULIN 6 UNITS/HR: 100 INJECTION, SOLUTION SUBCUTANEOUS at 14:02

## 2017-08-17 RX ADMIN — PENTAMIDINE ISETHIONATE 300 MG: 300 INHALANT RESPIRATORY (INHALATION) at 17:15

## 2017-08-17 RX ADMIN — ASCORBIC ACID TAB 250 MG 250 MG: 250 TAB at 08:22

## 2017-08-17 ASSESSMENT — PAIN DESCRIPTION - DESCRIPTORS
DESCRIPTORS: SORE
DESCRIPTORS: SORE

## 2017-08-17 NOTE — PLAN OF CARE
Problem: Individualization  Goal: Patient Preferences  VSS this shift except BP elevated this morning. Sepsis protocol triggered. LA 1.2. Pt was more alert and oriented this morning, compared to previous days, but became more confused throughout the day. Diet advanced to Regular. Pt has had half an applesauce and 4 cartons of milk so far today. BGs labile on insulin gtt. Currently on algorithm #4. 2 flank drains in place, irrigated per orders. Pt has voided in urinal x2, and been incontinent of urine x2. Good stool output from ileostomy. NJ tube in place with TF at goal rate. R PICC intact with TKO. Prograf gtt to infuse until 6pm tonight when po dose is scheduled. Dressing change done to mucous fistula. WOC RN changed sacral dressing and ileostomy bag. Family present for ileostomy change to learn process for eventual discharge home. Pt due to receive pentamadine neb this afternoon. VPM in place for safety. Pt got up to chair x2 this shift. Up with assist of 2. He was able tolerate being in the chair for over an hour.

## 2017-08-17 NOTE — PROGRESS NOTES
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  Now s/p ex lap with R hemicolectomy, end ileostomy, mucus fistula, partial omenectomy on 7/26.    BG on IV insulin well controlled ,On steroids : 0.1 mg  Fludrocortisone, Cr improving from EJ, GFR 62 today remains mildly acidotic, TF has not been re-started, on dysphagia diet, but little intake    Plan:  -does not require D10W for hypoglycemia prevention since insulin drip can be titrated down  -continue insulin drip.    -meal aspart 1unit/7g CHO ordered for meals and snacks    Will continue to follow  Please notify diabetes team of changes planned for nutrition support schedule.     Outpatient diabetes follow up: Dr. Eckert at [facility]                   Interval History:   8/7/17 Pt upset about inpatient diabetes mgmt consult.  Transplant contacted pt's outpatient endocrinologist Dr. Eckert of [facility]-- no issue with inpatient team managing pt's glucose    IV insulin continues with stable BG control TF remains off. Taking in little po on dysphagia diet.  May need to start TPN,  .  Camacho remains confused and disoriented intermittently.  No resp, constitutional C/O, ileostomy site intact      Recent Labs  Lab 08/17/17  0910 08/17/17  0757 08/17/17  0719 08/17/17  0632 08/17/17  0558 08/17/17  0533 08/17/17  0404  08/16/17  0650  08/15/17  0639  08/14/17  0621  08/13/17  0631  08/12/17  0630   GLC  --   --   --  128*  --   --   --   --  129*  --  112*  --  133*  --  113*  --  147*   * 137* 131*  --  129* 133* 133*  < >  --   < >  --   < >  --   < >  --   < >  --    < > = values in this  interval not displayed.            Review of Systems:   See interval hx          Medications:   PTA taking Januvia and glargine versus glargine and aspart per carb    Active Diet Order      Dysphagia Diet Level 3 Advanced Thin Liquids (water, ice chips, juice, milk gelatin, ice cream, etc)     Physical Exam:  Gen: Alert,  Talkative, NAD  Skin Warm, dry   Resp: normal, on RA  Neuro: intermittently confused    BP (!) 175/94  Pulse 88  Temp 97.8  F (36.6  C)  Resp 20  Ht 1.829 m (6')  Wt 91.8 kg (202 lb 6.4 oz)  SpO2 96%  BMI 27.45 kg/m2           Data:     Lab Results   Component Value Date    A1C  07/06/2017     Canceled, Test credited   Below Assay Range  NOTIFIED LEONARD ONEILL AT 0538 ON 7/6/17 BY CHRISSY      A1C 9.4 02/16/2017    A1C 6.2 12/14/2016    A1C 6.1 10/27/2016    A1C 8.3 07/02/2012            Recent Labs   Lab Test  08/15/17   0639  08/14/17   0621   NA  140  143   POTASSIUM  4.3  4.4   CHLORIDE  115*  114*   CO2  18*  16*   ANIONGAP  8  13   GLC  112*  133*   BUN  75*  71*   CR  1.87*  1.92*   JACOB  8.1*  8.4*     CBC RESULTS:   Recent Labs   Lab Test  08/15/17   0639   WBC  2.7*   RBC  2.59*   HGB  7.4*   HCT  23.3*   MCV  90   MCH  28.6   MCHC  31.8   RDW  17.4*   PLT  118*     Liver Function Studies -   Recent Labs   Lab Test  08/14/17   0621   PROTTOTAL  6.1*   ALBUMIN  1.8*   BILITOTAL  0.7   ALKPHOS  271*   AST  24   ALT  17     Diabetes Management job code [id number]  Roselia Bryson CNP pager [phone]

## 2017-08-17 NOTE — PLAN OF CARE
Problem: Goal Outcome Summary  Goal: Goal Outcome Summary  Pt is intermittently HTN in the 150s-160s. OVSS on RA. Pt denies pain and nausea. Pt is intermittently confused. Disoriented to time consistently. He sometimes is disoriented to situation. Pt is tolerating TF at 65. Pt had no appetite tonight and did not eat any of his dinner despite encouragement. Insulin drip stayed between 2 and 3 units per hour using to algorithm 2.  Both drains were irrigated during shift, the lateral drain was irrigated twice with 20 mls per orders. Ileostomy is intact and put out 675 mls. Pt was incontinent of urine x 2, used the urinal x 1. Prograf drip is infusing at 2 mls/hr. Tac level recheck came back at 5.3. Will continue to monitor care.

## 2017-08-17 NOTE — PROGRESS NOTES
"[facility]  Transplant Infectious Diseases Progress Note      Camacho Bhagat MRN# [id number]   YOB: 1965 Age: 51 year old   Date of Service: 8/15/2017        Assessment and Recommendations:     Recommendations:  - Consider additional imaging (?UGI with small bowel follow through +/- imaging from below) to further assess for enterostomic leak per primary team   - Continue empiric Zosyn indefinitely for the right retroperitoneal abscess,  intraperitoneal gas foci in setting of ongoing ascites and apparent secondary peritonitis  - 8/15 Ascitic fluid cultures from 8/15 remain NGTD   - 8/15 EBV blood PCR improved to 753 (406,697 -> 6864 -> 753)  - Continue to monitor blood CMV PCR  (sent today 8/17)   - Continue TMP-SMX prophylaxis.  - Continue PO ganciclovir to \"high-maintenance\" dose at 7.5 mg / kg daily  - Leukopenia likely d/t Ganciclovir, recommend continuing Ganciclovir as above for now  - While leukopenic recommend daily differential with CBC --> If ANC <1.0, recommend G-CSF x1 (300mcg) and re-check ANC in am before re-dosing (ANC 1.1 on 8/17)     Transplant ID will continue to follow.    Patient seen and discussed with attending physician, Dr. Del Toro.     Reshma Concepcion MD  Internal Medicine, PGY-3  [phone]    Assessment:  A 51 year old gentleman immunosuppressed (tacrolimus; MMF on hold; received basiliximab initial induction) s/p liver transplant on 3/4/17 for CASTAÑEDA who was admitted 7/4/17 with colitis and underwent 7/26/17 exploratory laparotomy after forming a RUQ phlegmon possibly due to colonic perforation.  The initial admission diagnosis was neutropenic colitis, but new primary seroconversion CMV viremia was discovered on 7/10/17.  He had persistent fevers from 7/10 - 15/17 and again from 7/21 - 8/4/17, was afebrile for three+ days, but has spiked renewed fevers (up to 100.7 degrees) since 8/7/17 late evening and is looking septic with increased malaise and " abdominal pain, obtundation, increased delirium, rising inflammatory markers, and a higher peripheral WBC.  Repeat 8/9/17 abdominal CT scan showed right sided free air.  A RUQ retroperitoneal abscess drain was placed by IR on 8/9/17 and broad-spectrum antibiotic therapy resumed -- since then he has improved and remains clinically stable as of 8/16 with no recurrent decompensation. Repeat 8/14 abdominal CT scan showed new intraperitoneal free air, peritoneal pigtail catheter placed by IR 8/15. Remains stable to mildly clinically improved regarding abdominal tenderness and orientation since that time.     ID issues:  # Renewed peritoneal sepsis / RUQ abdominal abscess noted on 8/9, unchanged on 8/14  # Persistent Ascites with new foci intraperitoneal gas on CT 8/14  Off antimicrobails (except TMP-SMX and Valcyte) for five days (8/4 - 8/8), after completing a ten day 7/25 - 8/3/17 empiric course of meropenem / daptomycin / micafungin.  On 8/9 pt again developed sepsis with a markedly increased procalcitonin, increased serum CRP, increased abdominal pain, and worsened confusion / obtundation.  8/9/17 chest / abdomen CT scan demonstrated little evidence of new infection above the diaphragm, but new infra-hepatic free air and an unchanged (versus the 8/4/17 abdominal CT scan) right retroperitoneal RUQ multifocal gas collection suggestive for an abscess.  Interventional Radiology placed a drain into that abscess 8/9/17 afternoon -- cultures have grown only a Clostridium species in the anaerobic broth culture.  Other potential sites for his sepsis beyond the abdomen were lacking and blood cultures remain negative.  On resumed empiric antibiotic therapy with Zosyn / linezolid since 8/9/17, his sepsis has resolved and he is again looking better with less obtundation.  All this has occured is in the wake of an exploratory laparotomy on 7/26/17 with a right angelique-colectomy / end ileostomy / mucous fistula / partial omentectomy  "-- intra-operatively no overt perforation was seen at that time, but right colitis was observed with a probable intra-adominal abscess or phlegmon (thought possibly due to a colonic perforation which was seen on the excised colonic histopathology).was found in BALs, and 8/5/17 ascites studies / cultures remain unremarkable.  While the sepsis seems presently controlled with drainage and broad-spectrum empiric antibiotics, his course, ongoing ascites fluid cell counts evidence of peritonitis (although ascites fluid cultures are suppressed on antibiotics), and the presence of Clostridium in the 8/9/17 anaerobic drainage culture implies that he very well may have an ongoing enterostomic leak into his peritoneum.  Repeat CT abd/pelvis done 8/14 demonstrates stable to mildly improved RP fluid collection, ongoing ascites, and a new foci of intraperitoneal gas that may be attributed to redistribution of peritoneal gas vs. Gas producing infection vs perforation or dehiscence of  Bowel. Patient went for sinogram and additional drain placement 8/15; ID services continues to be concerned that drains alone will not provide adequate source control if there is an enterostomic leak that has not been clearly identified as of yet; however patient is extremely high risk for re-operation and surgeons would prefer to manage with drains and aniti-microbials and continue to monitor.  Would recommend further imaging studies to help determine presence of leak. Lastly, agree with dc of Linezolid 8/15, will continue to monitor now that patient is without VRE coverage, although on exam 8/17 has decreased ttp on abdominal exam and orientation is improved.     # CMV viremia /  CMV colitis:    He presented with a \"detected\" low level CMV viremia (detected < 137 IU / ml) upon admission and developed a progressively worsening viremia after stopping his Valcyte prophylaxis.  Valcyte was re-started on 7/15/17 when the blood CMV PCR viral load was " "4,225 IU / ml (3.6 log) and that was switched to ganciclovir on 7/20/17 with a viral load of 1,906 IU /ml (3.3 log). After one week of GCV treatment, the blood CMV PCR dropped to 290 IU / ml (2.5 log) on 7/27/17 and decreased further to < 137 IU / ml (< 2.1 log) on 8/3/17 (a > 1 log drop in the viremia over two weeks).  The CMV immunostain of colonic tissue biopsy was positive; confirming a CMV etiology of his colitis.  Because of an initial slow pace of viremia decline, he had been treated with supra-high dose ganciclovir (doubled to 10 mg / kg / dose BID) for the possibility of ganciclovir resistance, but with the improving viral load on 8/3/17, his ganciclovir dose was reduced to 7.5 mg / kg IV BID on 8/4/17 PM.  On that, the 8/10/17 repeat CMV viral load was again undetectable, so he was decreased to \"high maintenance\" ganciclovir dosing at 7.5 mg / kg / day on 8/11/17.  Weekly blood CMV PCR viral load checks (next due to be checked 8/17/17) will continue to make certain they remain suppressed at this maintenance dose.  (A CMV drug resistance genotype assay ordered on 7/27/17 went astray, so we have no genotypic data to help with decisions.) Of note, Ganciclovir likely responsible for leukopenia but not preferable to hold or decrease dose given clinical context.     # Hx Pancytopenia / Recurrent Leukopenia:   Patient has known hx pancytopenia in the past, attributed to Valcyte and Bactrim. Has ongoing chronic anemia and thrombocytopenia this admission, though leukopenia had resolved while intermittently septic with normal WBC during most of admission. Over past 72 hours beginning 8/12 patient has had leukopenia; no differential completed during that time so unclear if neutropenic. Suspect this is due to IV Ganciclovir, especially given that patient has received supra-therapeutic doses this admission given c/f low level CMV resistance to ganciclovir. As patient is recovering from CMV viremia and remains at high " risk for recurrence, would recommend treating with G-CSF as needed as opposed to holding or decreasing dose of Ganciclovir. Would add on differential to CBC daily and treat only for ANC <1.0,      # Encephalopathy:  His delirium and somnolence seem to vary with the state of his overall health and abdominal sepsis, so likely represent toxic-metabolic encephalopathy associated with sepsis and also somewhat with medications side effects superimposed on chronic hepatic encephalopathy.  A 7/20/17 brain MRI scan was unremarkable and a repeat brain MRI has not yet been deemed needed.  An 8/5/17 serum cryptococcal antigen assay was negative.  The likelihood of any CNS infection at this time is exceeding low and additional CNS evaluation is presently not needed. As of 8/17 encephalopathy appears mildly improved.     # Improved EBV viremia:  Initially discovered to have EBV viremia at 406,697  / ml on 7/18/17.  With decreased immunosuppression, that had fallen to 6,864  / ml by 8/1/17.  Checking blood EBV PCRs every couple weeks is reasonable for now.  Random colonic biopsies from the 7/21/17 colonoscopy and 7/26/17 hemicolectomy histopathology results were not suggestive for PTLD. Repeat EBV PCR sent 8/15 with improvement as viral load continues to fall to 753 /ml.     # S/p liver transplant:  Continue on tacrolimus; but MMF was held 6/27/17 in the context of sepsis and pancytopenia    # Recurrent EJ:  Patient initially with EJ attributed to severe sepsis, Cr normalized to near 1, then up-trended to ~1.8. Possibly d/t CNI toxicity (Tacro level 8.2 and dose decreased 8/14) and also d/t ?ongoing infection. Nephrology consulted, agree with maintaining hydration given high ostomy outputs and close monitoring of Tacro level. As of 8/17 Cr is normalizing and Tacro level has remained within therapeutic range.     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  Nevertheless, he received a full ten day (7/25 - 8/3/17) daptomycin course for severe sepsis in the setting of colonization with VRE and was on Linezolid empirically, from 8/9-8/15.   -  Staphylococcus capitis in ascitic fluid 7/5/17:  Was culture contamination.   - Rhinovirus in BAL 7/15/2017:  Likely to be due to chronic shedding, since his main presentation was abdominal, not respiratory.    Other ID issues:  - PCP prophylaxis: TMP-SMX was held in 6/17 due to neutropenia, but resumed 8/4/17.  - Serostatus:  CMV D+/R-, EBV D+/R-, HSV1?/2?, VZV ?.  Presently on IV ganciclovir.  - Immunization status: up-to-date.  - Gamma globulin status: >1660 as of 7/24/2017  - Isolation: Contact isolation for a history of VRE carriage.    Interval History:  No acute events overnight. Remains afebrile and hemodynamically stable. Patient notes that he feels more awake this am, and he is working on his phone at time of examination. Is able to converse more appropriately and is oriented to self and place. Was up in the chair yesterday and plans to do so again today.      [facility]ious [facility] Ill[facility] (copied from the 8/4/17 Transplant ID Note):   A 51 year old gentleman with PMH of DM, HTN, fibromyalgia, previous DVT with IVC filter,  s/p liver transplant secondary to ESLD due to CASTAÑEDA on 03/04/2017, CMV D+/R-, EBV D+/R-, who was admitted on 07/04 due to neutropenic enterocolitis starting empiric therapy with zosyn + flagyl + vancomycin + micafungin being transferred to the ICU, requiring exploratory laparotomy + wash out for neutropenic enterocolitis on 07/04, reporting inflamed cecum and ascending colon, having a second look on 07/05 finding improvement of the inflammation and performing closure of abdominal incision; culture resulted S. Capitis considered a contaminant. Valcyte was stopped since admission. On 07/06, flagyl +  vancomycin + micafungin were stopped and zosyn changed to ertapenem. As per ID note from 07/06, recommended to switch back ertapenem to zosyn since thrombocytopenia was seen before zosyn treatment, pancytopenia possibly related to medication (MMF, bactrim, valcyte, initially seen at the beginning of June) and recommended to monitor CMV PCR weekly ( on 07/03: positive < 137; before on 06/26 was ND). Additionally, on 07/12 a BAL was repeated and showed VRE/CONS/P putida/C. Albicans, all were considered a colonizers, and primary team continued with ertapenem, held MMF, bactrim, valcyte). On 07/13, it was recommended to re-start valcyte since his CMV PCR was 145 and counts improved. Patient persisted febrile, with evident ascitis tapped but culture resulted negative. Course complicated with thrombosis of the right arterial artery with ischemia to the tip of the ischemic finger. On 07/15, BAL was repeated and was positive for rhinovirus. Valcyte was re-initiated on 07/15 due to CMV PCR: 4225. PAtient was discharged from the ICU on 07/17. Also, 14 days of ertapenem were completed on 07/18 and EBV PCR was taken on 07/18 and resulted [id number] concerning of PTLD since patient persisted febrile with ascitis and colitis. On 07/20, ZAKIA was switched to GCV due to worsening level of 1906. Patient became encephalopathic but MRI resulted negative. On 07/21 a colonoscopy was performed and showed normal colonic mucosa; random biopsies were taken. On 07/25, patient deteriorated clinically presenting respiratory failure requiring intubation, so was transferred to ICU and started empiric treatment with meropenem + daptomycin + micafungin, and GCV dose was increased to 10 mg BID. A CT abdomen was repeated and showed retroperitoneal air so underwent EL + lysis of adhesions + right hemicolectomy + ileostomy + partial omentectomy  due to ascending colitis (no neida perforation or ischemia). On 07/27 CMV PCR resulted 290. The initial admission  diagnosis was neutropenic colitis, but new primary seroconversion CMV viremia was discovered on 7/10/17.  He had persistent fevers from 7/10 - 15/17 and again from 7/21 - 8/4/17, was afebrile for three+ days, but has spiked renewed fevers (up to 100.7 degrees) since 8/7/17 late evening and is looking septic with increased malaise and abdominal pain, obtundation, increased delirium, rising inflammatory markers, and a higher peripheral WBC.  Repeat 8/9/17 abdominal CT scan showed right sided free air.  A RUQ retroperitoneal abscess drain was placed by IR on 8/9/17 and broad-spectrum antibiotic therapy resumed -- since then he has improved. He has been on empiric Zosyn plus linezolid (for his history of VRE) since 8/9/17 (as well as ongoing IV ganciclovir since 7/20/17 and TMP-SMX prophylaxis since 8/4/17).  He has subsequently been afebrile with down-trending WBC (now leukopenic). All other clinical symptoms improving (decreasing abdominal pain, slowly improving obtundation, slightly more interactive though remains disoriented. Is able to hold conversation though easily confused.  Other than 8/9/17 drainage anaerobic culture growing a Clostridium species in broth (at 48 hours incubation) all other recent culture data remains NGTD.  CT abd/pelvis 8/14 notable for new foci of intraperitoneal gas not present on CT 8/9 as well as ongoing moderate ascites and RP fluid collection with migrated drain. IR placed peritoneal drain 8/15, Linezolid discontinued 8/15. Remains stable to slightly improved since that time.     Transplants:  3/4/2017 (Liver)    Review of Systems:  Difficult to obtain d/t patient's encephalopathy, oriented only to self, occasionally to place    CONSTITUTIONAL:  Afebrile past several  days no fevers, chills, sweats  EYES: No eye pain, visual changes, or scleral icterus.  ENT:   Left ear lobe pressure ulcer.  LYMPH:  No edema.  RESPIRATORY:  No cough, increased sputum, or dyspnea.  CARDIOVASCULAR:  No  chest pain, palpitations.  GASTROINTESTINAL:  Improved abdominal pain, Significant ascites improving daily,  S/p liver transplant.  Dysphagia.  Liquid stool in his ileostomy.  No nausea, vomiting, or constipation  GENITOURINARY:  Improved EJ.  Crowe in place draining clear yellow urine,  HEME:  Chronic anemia. Recurrent leukopenia, No easy bruising.  ENDOCRINE:  Type 2 diabetes mellitus on insulin.  MUSCULOSKELETAL:  Necrotic toe tips.  Generally deconditioned.  Coccygeal decub.  No new focal myalgias / arthralgias.  SKIN:  No rash or pruritus.  NEURO:  A/O x2(to self and place, but able to hold vague conversation about clinical status,  No headache.  PSYCH:  Negative.    Past Medical / Surgical History:  Past Medical History:   Diagnosis Date     Cancer (H)      Depressive disorder, not elsewhere classified      Esophageal reflux      Fibromyalgia 1/2009    dx with Dr Benitez( Rheum)     History of thrombophlebitis      Osteoarthritis      Other acute embolism veins 11/01    Deep vein thrombophlebitis, filter placed     Other and unspecified hyperlipidemia      Other chronic nonalcoholic liver disease     Fatty liver      Other testicular hypofunction      Pneumonia, organism 10-01    Included ARDS, sepsis, and  acute renal failure; hospitalized     Rheumatoid arthritis(714.0)      Type II or unspecified type diabetes mellitus without mention of complication, not stated as uncontrolled 11/01    Managed by endocrinology     Unspecified essential hypertension 11/01    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     Unspecified sleep apnea     Uses BiPAP     Past Surgical History:   Procedure Laterality Date     BENCH LIVER N/A 3/4/2017    Procedure: BENCH LIVER;  Surgeon: Jovan Tran MD;  Location: [facility] OR     C NONSPECIFIC PROCEDURE      tracheostomy     C NONSPECIFIC PROCEDURE      repair of deviated septum     C NONSPECIFIC PROCEDURE  2007    Rt knee arthroscopy     C TOTAL KNEE ARTHROPLASTY  2008     Right knee arthroscopy     CHOLECYSTECTOMY       COLONOSCOPY N/A 2017    Procedure: COMBINED COLONOSCOPY, SINGLE OR MULTIPLE BIOPSY/POLYPECTOMY BY BIOPSY;  Colonoscopy;  Surgeon: Izaiah Montes MD;  Location: [facility] GI     ESOPHAGOSCOPY, GASTROSCOPY, DUODENOSCOPY (EGD), COMBINED N/A 2016    Procedure: COMBINED ESOPHAGOSCOPY, GASTROSCOPY, DUODENOSCOPY (EGD), BIOPSY SINGLE OR MULTIPLE;  Surgeon: Trent Pederson MD;  Location: [facility] GI     LAPAROTOMY EXPLORATORY N/A 2017    Procedure: LAPAROTOMY EXPLORATORY;  Exploratory Laparotomy, washout;  Surgeon: Tip Zhang MD;  Location: [facility] OR     LAPAROTOMY EXPLORATORY N/A 2017    Procedure: LAPAROTOMY EXPLORATORY;  Exploratory Laparotomy, Washout with closure.;  Surgeon: Tip Zhang MD;  Location: [facility] OR     LAPAROTOMY EXPLORATORY N/A 2017    Procedure: LAPAROTOMY EXPLORATORY;  Exploratory Laparotomy, Right angelique-colectomy, end ileostomy, mucosal fistula, partial omentectomy;  Surgeon: Sara Dinh MD;  Location: [facility] OR     TRANSPLANT LIVER RECIPIENT  DONOR N/A 3/4/2017    Procedure: TRANSPLANT LIVER RECIPIENT  DONOR;  Surgeon: Jovan Tran MD;  Location: [facility] OR     Current Scheduled Medications:    fludrocortisone  0.1 mg Oral Daily     pentamidine  300 mg Inhalation Once     albuterol  2.5 mg Nebulization Once     amLODIPine  5 mg Oral Daily     sodium chloride (PF)  10 mL Irrigation Q8H     iohexol  50 mL Oral Once     fiber modular  1 packet Per Feeding Tube BID     sodium chloride (PF)  20 mL Irrigation Q6H     diphenoxylate-atropine  5 mL Oral 4x Daily     saccharomyces boulardii  250 mg Oral BID     sodium bicarbonate  1,300 mg Per NG tube TID     ascorbic acid  250 mg Oral Daily     ganciclovir (CYTOVENE) intermittent infusion  7.5 mg/kg Intravenous Q24H     miconazole with skin protectant   Topical BID     piperacillin-tazobactam  3.375 g Intravenous Q6H     multivitamin CF formula  5 mL Per  Feeding Tube Daily     zinc sulfate  220 mg Per Feeding Tube Daily     loperamide  4 mg Oral or Feeding Tube 4x Daily     insulin aspart   Subcutaneous TID w/meals     protein modular  1 packet Per Feeding Tube TID     aspirin  80 mg Oral Daily     pantoprazole  40 mg Oral or Feeding Tube Daily     sodium chloride (PF)  3 mL Intracatheter Q8H     Physical Examination:  Vital sign ranges over the past 24 hours:  Temp:  [97.2  F (36.2  C)-98  F (36.7  C)] 97.8  F (36.6  C)  Heart Rate:  [] 89  Resp:  [20] 20  BP: (117-175)/(89-95) 175/94  SpO2:  [93 %-96 %] 96 %    Intake/Output Summary (Last 24 hours) at 08/11/17 1705  Last data filed at 08/11/17 1700   Gross per 24 hour   Intake           2283.2 ml   Output             1705 ml   Net            578.2 ml     Physical Examination:  GENERAL:  Awake, alert, using cell phone, 51 year old man supine in bed in NAD.  EYES:  EOMI, anicteric sclerae.  ENT:  No otorrhea.  NJ tube present.   No anterior oral lesion.  NECK:  Supple.  LYMPH:  No cervical lymphadenopathy.  LUNGS:  Few bilaterally scattered coarse rhonchi loudest in bases on anterior auscultation   CARDIOVASCULAR:  Regular rate and rhythm, normal S1, S2, without murmur, gallop, or rub.  ABDOMEN:  Normal bowel sounds, soft, minimal ttp throughout, right sided ttp significantly improved, mildly distended, midline stapled wound lacks inflammation with dressed superior mucous fistula, RLQ ileostomy with liquid stool, right TABITHA drain with serous drainage, new RLQ pigtail catheter in place draining red tinged fluid with small amount of debris   :  Crowe with hardik urine.  EXTREMITIES:  Distally warm, no edema,  ischemic right hallux and right second toe, also ischemic tip of left second toe, and right index, no ulcers.  Right PICC line site lacks inflammation.  NEUROLOGIC:  Responding, oriented x 1.5, moves extremities x 4.    Inflammatory Markers    Recent Labs   Lab Test  08/16/17   0650  08/09/17   0711   08/07/17   0549  08/06/17   0633  08/05/17   0616  07/05/17   1810  03/05/17   0358  11/23/16   0813   08/15/11   1151  04/11/11   1209  01/03/11   1246  02/15/10   1232   SED   --    --    --    --    --    --    --   45*   --   11  14  17*  25*   CRP  140.0*  190.0*  130.0*  130.0*  140.0*  354.0  20.0*  58.0*   < >  11.1*  9.0*  11.7*  17.5*    < > = values in this interval not displayed.     Immune Globulin Studies     Recent Labs   Lab Test  07/24/17   0705  08/15/11   1243   IGG  1660*  1160   IGG1   --   734   IGG2   --   294   IGG3   --   7*   IGG4   --   59     Metabolic Studies       Recent Labs   Lab Test  08/17/17   0632  08/16/17   0832  08/16/17   0650  08/15/17   0639  08/14/17   0621  08/14/17   0020  08/13/17   0631   08/12/17   0630   08/01/17   0651   07/25/17   0945   07/06/17   0316   NA  142   --   142  140  143   --   142   --   143   < >  150*   < >  137   < >  143   POTASSIUM  4.0   --   4.0  4.3  4.4   --   4.5   --   4.4   < >  4.3   < >  4.0   < >  5.0   CHLORIDE  116*   --   117*  115*  114*   --   116*   --   115*   < >  123*   < >  104   < >  116*   CO2  18*   --   16*  18*  16*   --   17*   --   22   < >  20   < >  26   < >  18*   ANIONGAP  8   --   9  8  13   --   9   --   7   < >  7   < >  7   < >  9   BUN  61*   --   65*  75*  71*   --   66*   --   68*   < >  51*   < >  77*   < >  62*   CR  1.23   --   1.50*  1.87*  1.92*   --   1.94*   --   1.82*   < >  0.90   < >  2.60*   < >  2.75*   GFRESTIMATED  62   --   49*  38*  37*   --   36*   --   39*   < >  89   < >  26*   < >  24*   GLC  128*   --   129*  112*  133*   --   113*   --   147*   < >  141*   < >  382*   < >  139*   A1C   --    --    --    --    --    --    --    --    --    --    --    --    --    --   Canceled, Test credited   Below Assay Range  NOTIFIED LEONARD ONEILL AT 0538 ON 7/6/17 BY EAANTOLIN     JACOB  8.2*   --   8.3*  8.1*  8.4*   --   8.4*   --   8.6   < >  8.1*   < >  7.6*   < >  6.9*   PHOS  3.9   --   4.4  4.0  5.1*    --   5.6*   --   5.7*   < >  3.1   < >   --    < >  5.5*   MAG  1.9   --   1.8  1.9  2.1   --   2.1   --   2.1   < >  1.9   < >   --    < >  2.1   LACT   --   1.2   --    --    --   0.7   --    < >   --    < >   --    < >   --    < >  1.5   CKT   --    --    --    --    --    --    --    --    --    --   16*   --   40   --    --     < > = values in this interval not displayed.     Hepatic Studies    Recent Labs   Lab Test  08/17/17   0632  08/14/17   0621  08/10/17   1323  08/10/17   0704  08/08/17   0600  08/02/17   0543  07/27/17   0410   07/25/17   0017   07/11/17   0328   BILITOTAL  0.6  0.7   --   2.2*  0.5  0.4  1.3   < >   --    < >  0.5   ALKPHOS  254*  271*   --   136  172*  199*  143   < >   --    < >  158*   ALBUMIN  1.7*  1.8*   --   1.9*  2.1*  2.3*  2.4*   < >   --    < >  1.8*   AST  33  24   --   36  28  62*  27   < >   --    < >  34   ALT  21  17   --   26  25  42  27   < >   --    < >  27   LDH   --    --   168   --    --    --    --    --   258*   --    --    GGT   --    --    --    --    --    --    --    --    --    --   58    < > = values in this interval not displayed.     Pancreatitis testing    Recent Labs   Lab Test  08/16/17   0650  07/24/17   0705  07/17/17   0630  07/12/17   0342  07/10/17   0340  07/05/17   0346  03/05/17   0358  03/03/17   1458  02/21/17   1520  12/09/16   1159  02/08/16   1144   09/30/10   1734   AMYLASE   --    --    --    --    --    --   53  70   --    --    --    --   82   LIPASE  55*   --    --    --    --    --   216   --   326  161   --    --   138   TRIG   --   303*  168*  114  177*  174*   --    --    --    --   99   < >   --     < > = values in this interval not displayed.     Hematology Studies      Recent Labs   Lab Test  08/17/17   0632  08/16/17   0650  08/15/17   0639  08/14/17   0621  08/13/17   0631  08/12/17   0630   08/07/17   0549   08/06/17   0633   08/05/17   0616  08/04/17   0548   WBC  1.9*  2.0*  2.7*  3.4*  3.3*  2.8*   < >  5.5   --   5.2    --   6.3  6.9   ANEU  1.1*   --   1.6   --    --    --    --   3.7   --   3.5   --   3.9  4.1   ALYM  0.6*   --   0.7*   --    --    --    --   1.5   --   1.5   --   2.2  2.5   ZINA  0.3   --   0.3   --    --    --    --   0.2   --   0.2   --   0.2  0.3   AEOS  0.0   --   0.0   --    --    --    --   0.1   --   0.0   --   0.0  0.0   HGB  7.3*  6.9*  7.4*  8.1*  8.4*  7.9*   < >  7.7*   < >  7.6*   < >  6.9*  7.8*   HCT  22.8*  21.2*  23.3*  25.2*  26.4*  24.5*   < >  23.7*   --   23.2*   --   20.9*  24.3*   PLT  107*  102*  118*  129*  156  132*   < >  116*   --   94*   --   94*  99*    < > = values in this interval not displayed.     Arterial Blood Gas Testing    Recent Labs   Lab Test  08/10/17   1515  08/02/17   1216  07/26/17   2243  07/26/17   2133   07/26/17 2017 07/25/17   1746   PH  7.33*  7.37  Incorrect specimen type  NOTTIED BY WOJCIECH DEWITT, NEW ORDER TO REPLACE.7/26/17 AT 2346 BY ZAINAB.  CORRECTED ON 07/26 AT 2350: PREVIOUSLY REPORTED AS 7.27     --    --   Canceled, Test credited   Incorrect specimen type    7.32*   PCO2  34*  31*  Incorrect specimen type  NOTTIED BY WOJCIECH DEWITT NEW ORDER TO REPLACE.7/26/17 AT 2346 BY ZAINAB.  CORRECTED ON 07/26 AT 2350: PREVIOUSLY REPORTED AS 45     --    --   Canceled, Test credited   Incorrect specimen type    42   PO2  68*  69*  Incorrect specimen type  NOTTIED BY WOJCIECH DEWITT, NEW ORDER TO REPLACE.7/26/17 AT 2346 BY ZAINAB.  CORRECTED ON 07/26 AT 2350: PREVIOUSLY REPORTED AS 53     --    --   Canceled, Test credited   Incorrect specimen type    81   HCO3  18*  18*  Incorrect specimen type  NOTTIED BY WOJCIECH DEWITT, NEW ORDER TO REPLACE.7/26/17 AT 2346 BY ZAINAB.  CORRECTED ON 07/26 AT 2350: PREVIOUSLY REPORTED AS 20     --    --   Canceled, Test credited   Incorrect specimen type    22   O2PER  2L  21  Incorrect specimen type  NOTTIED BY WOJCIECH DEWITT, NEW ORDER TO REPLACE.7/26/17 AT 2346 BY ZAINAB.  CORRECTED ON 07/26 AT 2350: PREVIOUSLY REPORTED AS 40    40  62   < >   100.0  100  40.0    < > = values in this interval not displayed.     Urine Studies     Recent Labs   Lab Test  08/10/17   1752  08/08/17   1600  08/05/17   0617  07/28/17   1042  07/25/17   1205   URINEPH  5.0  5.0  5.0  5.0  5.0   NITRITE  Negative  Negative  Negative  Negative  Negative   LEUKEST  Negative  Negative  Negative  Negative  Trace*   WBCU  10*  7*  5*  6*  5*     Vancomycin Levels     Recent Labs   Lab Test  07/06/17   0316  10/28/16   1405   VANCOMYCIN  22.1  14.4     Procalcitonins:  8/12 9.21  8/9 7.55  8/8 3.11  8/5 1.06  8/2 0.78    Microbiology:  8/15 Ascites fluid cultures: gs negative, NGTD   8.15 Ascites Fluid: WBC 82118, PMNs 92%, Glucose 15, LDh 2669  8/11 Ascites cx:  NGTD.  Gram stain:  NOS, many WBCs (predominately PMNs).  Anaerobic / fungal cxs NGTD.  Fluid analysis:  Yellow, cloudy, WBC 5,038 (83% N, 1% L, 16% M), protein 4.0, , glucose 5, amylase 19, bilirubin 1.2.  8/11 Ur cx NGTD  8/9 Right retroperitoneal abscess drainage aerobic cx:  NGTD.  Gram stain:  NOS, many WBCs (predominately PMNs).  Anaerobic cx:  Clostridium sps isolated in broth at 48 hours of incubation.  8/9 x 2, 8/4 x 2, 8/2 x 2, 7/31 x 2, 7/28 x 2, 7/25 x 2, 7/15 Bld cxs NGTD / negative  8/5 ascites aerobic / anaerobic / fungal cxs NGTD.  Gram stain:  NOS, few WBCs (predominately PMNs), moderate RBCs.  Fluid analysis:  Yellow, slightly cloudy,  (91%N, 6% L, 3% M), albumin 1.6, protein 3.8.  8/5, 7/25 Ur cxs:  Negative  8/5 Serum CRAG negative  7/29 Sp cx:  Normal elvie, heavy C albicans  7/26 Ascites (OR) cx:  Negative.   Gram stain:  NOS, few WBCs.  7/25 Ascites cx: Negative.  Gram stain:  NOS, no WBCs  7/15 BAL fluid studies:  Yeast and Rhinovirus  7/12 BAL fluid studies:  Single colony of VRE, C albicans/dubliniensis   7/11 Sp cx:  VRE and Coag Neg Staph   7/5 Ascites cx:  S capitis    CMV viral loads    Recent Labs   Lab Test  08/10/17   0704  08/03/17   0533  07/27/17   1109  07/20/17   1427   07/18/17   0530   CSPEC  EDTA PLASMA  CORRECTED ON 08/11 AT 0714: PREVIOUSLY REPORTED AS Whole Blood    EDTA PLASMA  EDTA PLASMA  CORRECTED ON 07/28 AT 0840: PREVIOUSLY REPORTED AS Blood    Plasma  Plasma   CMVLOG  <2.1  <2.1  2.5*  3.3*  3.0*     CMV viral loads    Log IU/mL of CMVQNT   Date Value Ref Range Status   08/10/2017 <2.1 <2.1 [Log_IU]/mL Final   08/03/2017 <2.1 <2.1 [Log_IU]/mL Final   07/27/2017 2.5 (H) <2.1 [Log_IU]/mL Final   07/20/2017 3.3 (H) <2.1 [Log_IU]/mL Final   07/18/2017 3.0 (H) <2.1 [Log_IU]/mL Final   07/15/2017 3.6 (H) <2.1 [Log_IU]/mL Final   07/10/2017 2.2 (H) <2.1 [Log_IU]/mL Final   07/03/2017 <2.1 <2.1 [Log_IU]/mL Final   06/26/2017 Not Calculated <2.1 [Log_IU]/mL Final     CMV resistance testing    EBV DNA Copies/mL   Date Value Ref Range Status   08/15/2017 753 (A) EBVNEG^EBV DNA Not Detected [Copies]/mL Final   08/01/2017 6864 (A) EBVNEG [Copies]/mL Final   07/18/2017 630954 (A) EBVNEG [Copies]/mL Final     Pathology:   Colectomy path 7/26/17  - Colonic wall with perforation, edema, and acute serositis   - Background colonic mucosa with no significant histologic abnormality   - CMV immunostain is positive in rare (3) cells   - Terminal ileum with no significant histologic abnormality   - Viable, unremarkable surgical margins   - One benign lymph node (0/1)   - Appendix with fibrous obliteration of the tip     Colon biopsies 7/21/2017   A: Colon biopsy, ascending   B: Colon biopsy, descending   FINAL DIAGNOSIS:   A. COLON BIOPSY, ASCENDING:   - Colonic mucosa with no significant histologic abnormality   - Negative for intraepithelial lymphocytosis or deposition of   subepithelial thick collagenous band   B. COLON BIOPSY, DESCENDING:   - Crypt architecture distortion, consistent with healed prior injury   - Negative for active inflammation or dysplasia   - Negative for intraepithelial lymphocytosis or deposition of   subepithelial thick collagenous band     Cytology of ascitic fluid  2017   PERITONEAL FLUID, ASCITES:   -Negative for malignancy   Cytology of ascitic fluid 2017.   Peritoneal fluid, ascites:   - Negative for malignancy   - Acute and chronic inflammation present   Ascites fluid:  Rare to absent viable B cells.  (This specimen was collected on 17 but was not ordered/delivered to   lab/run until 17 - results should be interpreted with caution due   to low cellularity/viability.   Due to limited cellularity we were only able to analyze the B cells.   There is no immunophenotypic evidence of B cell non-Hodgkin lymphoma.   Neoplastic cells, including large cells, may not survive specimen   processing. This sample may not be representative. Final interpretation   requires correlation with morphologic and clinical features.)    Imagin/15: IR RLQ 12 Fr pigtail drain placed in intraperitoneum, 60 cc fluid sent for labs and culture    CT abd/pelvis: moderate volume ascites stable with increased foci of intra-peritonal gas, thickened peritoneum c/w peritonitis, complex right RP gas collection stable to slightly decrease since , stable loculated bibasilar pleural effusions and bibasilar opacities, postsurgical changes of R hemicolectomy and several loops borderline dilated bowel    Paracentesis performed by IR.  8/10 CXRs:  Right PICC.  Increasing pulmonary edema.  Left retrocardiac and basilar opacities, atelectasis and/or infection.  Small right pleural effusion.  8/10 Abdm U/S:  Small to moderate volume complex ascites visualized, greatest in the lower quadrants, left greater than right. Right retroperitoneal air again noted.  Right-sided pleural effusion.   CT-guided retroperitoneal abscess drainage performed by IR.   Chest / abdm CT:  Unchanged right retroperitoneal air with new foci of free intraperitoneal air in the right upper quadrant. No extravasation of oral contrast identified.  No significant change in large volume abdominal ascites,  thickened peritoneum and diffuse bowel wall thickening concerning for peritonitis.  Small right and trace left pleural effusions with overlying atelectasis and patchy consolidations. Overall, the consolidations are slightly increased since the prior exam. Differential includes atelectasis or infection.  Secondary signs of portal hypertension similar to the prior exam.  Unchanged mild bilateral renal pelvocaliectasis.  8/8 CXR:  Increased bibasilar streaky opacities, compatible with atelectasis and/or infection.  Probable small bilateral pleural effusions.  Persistent low lung volumes.  8/5 Ultrasound-guided paracentesis performed by IR:  250 ccyellow-brown fluid sent for analysis.  8/4 Abdm CT:  Heterogenous area of right-sided retroperitoneal gas has mildly decreased in size (versus 7/25/17 scan).  Moderate to severe ascites with diffuse peritoneal enhancement.  This may represent diffuse infection, for example bacterial peritonitis.  Mild residual foci of free air in the abdomen is likely postsurgical.  Diffuse small bowel and colonic wall thickening, similar to prior, likely reactive to ascites/peritonitis.  Findings of portal hypertension including splenomegaly, ascites, esophageal and upper abdominal varices.  Small right pleural effusion with associated atelectasis. Improved left pleural effusion and basilar consolidation.  8/4 CXR:  Decreased lung volumes with improving perihilar and bibasilar opacities, which may represent atelectasis, pulmonary edema, or infection.  8/2 CXR: Decreased lung volumes with mildly increased perihilar and bibasilar opacities, which may represent pulmonary edema, atelectasis, or infection.  Small right pleural effusion.  8/2 BLE U/S:  Right leg: Normal RONAL. Mildly Abnormal TBI, suggestive of small vessel disease. Patent right lower extremity arteries without evidence of hemodynamically significant stenosis.  Left leg: Normal RONAL. Abnormal TBI, suggestive of small vessel disease.  Patent left lower extremity arteries without evidence of hemodynamically significant stenosis.  7/31 CXR:  Improving perihilar and bibasilar opacities likely represent infection/aspiration, atelectasis, or pulmonary edema.  Moderate opacities bilaterally persist.  Stable small to moderate pleural effusions bilaterally.  7/28 CXR:  Interval removal of the endotracheal tube.  Increased perihilar opacities, worsening infection versus pulmonary edema.  Increased bilateral moderate pleural effusions with overlying atelectasis/consolidation.  7/25 Chest / abdm CT:  New large heterogeneous area of right-sided retroperitoneal gas which is highly concerning for an infectious process.  Given the history of prior neutropenic colitis and biopsies, there could also be a component of stool from a ruptured viscus, despite the lack of  surrounding bowel loops and no extraluminal oral contrast. Surgical consultation is recommended. Bibasilar atelectasis versus consolidation with small bilateral pleural effusions.  Extensive mesenteric and soft tissue edema with large volume ascites. Numerous mesenteric and retroperitoneal lymph nodes which are presumably reactive.  Postsurgical changes of liver transplant.  7/20 Brain MRI:  Significant image degradation due to motion artifact, with no definite acute intracranial pathology. No abnormal contrast enhancing intracranial lesions.  Mucosal thickening in the sphenoid and maxillary sinuses and left greater than right mastoid fluid are nonspecific in the setting of recent intubation.  7/13 Chest / abdm CT:  Improved moderate right-sided pleural effusion and resolution of left-sided pleural effusion. There remain large areas of atelectasis/consolidation in both lungs, including in the left lower lobe despite resolution of left-sided pleural effusion. Superimposed infectious process cannot be excluded.  Moderate-large amount of ascites increased from 7/8/2017, which makes it difficult to  evaluate for the presence or absence of inflammatory change or infectious process in the abdomen or pelvis. No intra-abdominal abscess.  Stable small presumed hematoma within the ventral abdominal wall fat.  Splenomegaly.

## 2017-08-17 NOTE — PLAN OF CARE
Problem: Goal Outcome Summary  Goal: Goal Outcome Summary     SLP 7A: Pt seen for dysphagia tx. Ok'd by MD for PO. Recommend pt advance to regular diet and thin liquids. Pt should be encouraged to sit upright, take small sips/bites and alternate consistencies. SLP to follow, anticipate dysphagia goals met prior to d/c.  Recommend TCU with SLP services for cognitive-linguistic evaluation at discharge.

## 2017-08-17 NOTE — PLAN OF CARE
Problem: Goal Outcome Summary  Goal: Goal Outcome Summary  OT 7A: Cancel-Pt sleeping then with PLC appt

## 2017-08-17 NOTE — PLAN OF CARE
Problem: Goal Outcome Summary  Goal: Goal Outcome Summary  PT-7A: Demonstrating increased gait distance this day, amb ~ 160' with FWW + CGA and WC with decreased saundra at end of amb.  Needing Mod A for bed mobility.     Recommend discharge to TCU.

## 2017-08-17 NOTE — PROGRESS NOTES
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 Admitted 7/4/17 from [facility] for evaluation and management of sepsis secondary to colitis, taken to OR for initial exploratory laparotomy with findings of typhlitis in the right colon, wound left open with wound vac in place for reexploration and interval closure on 7/5/2017. Concern for CMV colitis with low count CMV viremia/GI PTLD and subsequently underwent colonoscopy on 7/21, random biopsies obtained.  On 7/25/2017 patient became septic with abdominal compartment syndrome. CT noted new large heterogeneous right sided retroperitoneal gas and air tracking along duodenum and underwent a exploratory laparotomy, right angelique-colectomy, end ileostomy, mucus fistula, partial omentectomy on 7/26 by colorectal surgery.  Antibiotics discontinued 8/3. 8/6 became septic with increased abd pain, confusion. 8/8 right retroperitoneal drain placed. Additional peritoneal drain placed 8/15.    Graft Function: Good function. LFTs acceptable (checking every Monday, Thursday).   Immunosuppression Management:   CellCept discontinued (6/27/17) due to neutropenia.   Prograf goal ~5-6 due to sepsis.  Unable to obtain detectable level with suspension.  Level 8/15, 6.6 tacrolimus drip was continued at 110/hr. Convert to oral tablets today, 4mg BID pending level this AM  Cardiorespiratory: Stable respiratory status, NLB on RA. Assist with aggressive pulmonary toilet. OOB to chair and ambulate as tolerated.   HTN:-169, Restart amlodipine 5mg suspension. Pt on Florinef- change to daily  Hematology: Pancytopenia present on admission, persists. Continue to hold MMF.   Primary CMV infection  Anemia- Hemoglobin range 7-8, today's hgb 6.9. Transfusing 1 unit today.  Last blood transfusion on 7/26  Leukopenia-stable WBC 2.0.   GI:   -Neutropenic colitis on admission.  Colonoscopy 7/21. Biopsy: resolving injury secondary to possible drug induced vs infectious.   -Bowel perforation: 7/25 CT scan abd/pelvis-new large heterogeneous right sided retroperitoneal gas and air tracking along duodenum.  No contrast extravasation.  No intraperitoneal air noted. Colorectal surgery performed Ex lap, right angelique-colectomy, end ileostomy, mucus fistula, partial omentectomy on 7/26. Findings no neida perforation, phlegmon/inflammation right colon. Colon pathology suggested colon perforation, negative for malignancy; CMV stain positive.    -Retroperitoneal abscess/ascites peritonitis: CT A/P 8/9 showed a persistent gas/fluid collection RLQ, peritonitis, some free air but no evidence of contrast extravasation. 8/11: Diag. Para WBC 5,038. Repeat CT scan abd/pelvis 8/14 - Complex gas-containing fluid collection in the right retroperitoneum decreased slightly in size. Moderate ascites, with intraperitoneal gas, peritonitis noted.  IR placed additional drain to abdomen with cloudy/turbid fluid. No leak identified on CT scan yesterday.   -Diet:  on dysphagia diet level 3 with thin liquids with tube feeds. Continue SLP care    -High output  ileostomy:  Goal ileostomy output ~ 1000 cc in 24 hrs.  Output 1.6L yesterday.  Loperamide 4mg QID/ Lomotil QID/Fiber BID.  Fluid/Electrolytes/Renal:   -Dehydration: improved after IVF. Continue with MIVF while NPO   -EJ: on admission, d/t ATN sec to sepsis. SCR remains elevated but stable around 1.8, 1.23 today. Nephrology following.  -Hypernatremia: resolved with free water.    -Hyperkalemia: continue florinef BID.  Endocrine: DM type 2. Endocrine consulting for glycemic management.   Infectious disease: Afebrile.  WBC 2.0. ID following.    -Retroperitoneal abscess: CT C/A/P 8/9 showed a persistent gas/fluid collection RLQ, peritonitis, some free air but no evidence of contrast extravasation.  IR placed RP drain, growing clostridium- on zosyn, linezolid. Stop  linezolid 8/15. Continue zosyn indefinitely at this time.  -CMV viremia: Primary CMV infection.  (CMV R-D+) CMV colitis per pathology, peak serum 7/15 4225 IU/ml.   IV Ganciclovir (started 7/20) -Continue high maintenance dosing. Follow CMV PCR weekly, now <137 x 2 weeks.  Next due 8/17.  -EBV viremia:  Peak serum 406,697 copies/ml on 7/18.  Down to 6864 as of 8/1.  Check weekly, now improved to 753.  -R/o SBP or abscess:  8/11 paracentesis.  WBC 5,038, cx negative.  Repeat today-fluid cloudy/purulent with cc=15,680.  Resolved issues:  -Severe sepsis: On admission, secondary to right colon phlegmon. Meropenem/daptomycin/micafungin tx x 10 days completed on 8/3. S/p right angelique-colectomy.  Path: CMV stain +, perforation of colon noted. Drain cultures with clostridium septicum.  Neuro: Toxic metabolic encephalopathy secondary to infection, prolonged hospital stay. Improving,virtual sitter present.   Vascular:  Evidence of microvascular injury in digits (toes) this is most likely due to injury while patient was on pressor therapy with sepsis.  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   Activity: Deconditioned due to prolong hospital course. Daily PT/OT, encourage pt to be OOB to chair. Encourage pulmonary toilet.   Prophylaxis: DVT-restart Heparin SQ  Disposition: 7A.     Medical Decision Making: Medium    PILLO/Fellow/Resident Provider:   Perico Garcia   Surgery PGY3  [phone]      Faculty: Tip Zhang M.D.      __________________________________________________________________  Transplant History:.  3/4/2017 DD OLT with Dr. Tran (Liver), Postoperative day: 166     Interval History:   Overnight events: No acute events overnight. Virtual sitter present due to patient pulling at lines. Tolerating diet, up to chair    ROS:   A 10-point review of systems was negative except as noted above.    Meds:    fludrocortisone  0.1 mg Oral Daily     pentamidine  300 mg Inhalation Once      albuterol  2.5 mg Nebulization Once     amLODIPine  5 mg Oral Daily     sodium chloride (PF)  10 mL Irrigation Q8H     iohexol  50 mL Oral Once     fiber modular  1 packet Per Feeding Tube BID     sodium chloride (PF)  20 mL Irrigation Q6H     diphenoxylate-atropine  5 mL Oral 4x Daily     saccharomyces boulardii  250 mg Oral BID     sodium bicarbonate  1,300 mg Per NG tube TID     ascorbic acid  250 mg Oral Daily     ganciclovir (CYTOVENE) intermittent infusion  7.5 mg/kg Intravenous Q24H     miconazole with skin protectant   Topical BID     piperacillin-tazobactam  3.375 g Intravenous Q6H     multivitamin CF formula  5 mL Per Feeding Tube Daily     zinc sulfate  220 mg Per Feeding Tube Daily     loperamide  4 mg Oral or Feeding Tube 4x Daily     insulin aspart   Subcutaneous TID w/meals     protein modular  1 packet Per Feeding Tube TID     aspirin  80 mg Oral Daily     pantoprazole  40 mg Oral or Feeding Tube Daily     sodium chloride (PF)  3 mL Intracatheter Q8H       Physical Exam:     Admit Weight: 94.2 kg (207 lb 11.2 oz) (SCALE 2)    Current vitals:   BP (!) 175/94  Pulse 88  Temp 97.8  F (36.6  C)  Resp 20  Ht 1.829 m (6')  Wt 91.8 kg (202 lb 6.4 oz)  SpO2 96%  BMI 27.45 kg/m2    Vital sign ranges:    Temp:  [97.2  F (36.2  C)-98.4  F (36.9  C)] 97.8  F (36.6  C)  Heart Rate:  [] 89  Resp:  [20] 20  BP: (117-175)/(89-95) 175/94  SpO2:  [93 %-96 %] 96 %  Patient Vitals for the past 24 hrs:   BP Temp Temp src Heart Rate Resp SpO2 Weight   08/17/17 0755 (!) 175/94 97.8  F (36.6  C) - 89 20 96 % 91.8 kg (202 lb 6.4 oz)   08/17/17 0408 (!) 161/95 - - - - - -   08/17/17 0407 (!) 168/91 97.9  F (36.6  C) Oral 90 20 96 % -   08/17/17 0016 154/90 98  F (36.7  C) Oral 95 20 94 % -   08/16/17 1901 158/90 98  F (36.7  C) Oral 99 20 95 % -   08/16/17 1657 (!) 162/92 - - 100 - 94 % -   08/16/17 1532 (!) 117/93 97.2  F (36.2  C) Oral 98 20 93 % -   08/16/17 1235 164/89 97.7  F (36.5  C) Oral 91 20 94 % -    08/16/17 1130 (!) 170/92 98.4  F (36.9  C) Oral 94 20 95 % -     General Appearance: NAD  Skin: normal, warm, dry  Heart: RRR  Lungs: nonlabored resps on RA  Abdomen: soft, rounded, more tender. Ileostomy with brown output. Mucus fistula covered. Midline incision, c/d/i.   : Crowe present.  Extremities: No edema.  Necrotic blackened areas on right 1st & 2nd toes and left 2nd toe.  Neurologic: A&O x 2, waxes and wanes. Nonsensical speech at times.       Data:   CMP    Recent Labs  Lab 08/17/17  0632 08/16/17  1100 08/16/17  0650  08/14/17  0621     --  142  < > 143   POTASSIUM 4.0  --  4.0  < > 4.4   CHLORIDE 116*  --  117*  < > 114*   CO2 18*  --  16*  < > 16*   *  --  129*  < > 133*   BUN 61*  --  65*  < > 71*   CR 1.23  --  1.50*  < > 1.92*   GFRESTIMATED 62  --  49*  < > 37*   GFRESTBLACK 75  --  59*  < > 45*   JACOB 8.2*  --  8.3*  < > 8.4*   MAG 1.9  --  1.8  < > 2.1   PHOS 3.9  --  4.4  < > 5.1*   LIPASE  --   --  55*  --   --    ALBUMIN 1.7*  --   --   --  1.8*   BILITOTAL 0.6  --   --   --  0.7   ALKPHOS 254*  --   --   --  271*   AST 33  --   --   --  24   ALT 21  --   --   --  17   FAMY  --  10  --   --   --    FLIPA  --  34  --   --   --    < > = values in this interval not displayed.  CBC    Recent Labs  Lab 08/17/17  0632 08/16/17  0650   HGB 7.3* 6.9*   WBC 1.9* 2.0*   * 102*     COAGS  No lab results found in last 7 days.    Invalid input(s): XA   Urinalysis  Recent Labs   Lab Test  08/10/17   1752  08/08/17   1600   06/14/17   1508   04/11/16   1345   COLOR  Yellow  Yellow   < >   --    < >  Yellow   APPEARANCE  Slightly Cloudy  Slightly Cloudy   < >   --    < >  Clear   URINEGLC  Negative  Negative   < >   --    < >  30*   URINEBILI  Small   This is an unconfirmed screening test result. A positive result may be false.  *  Negative   < >   --    < >  Negative   URINEKETONE  Negative  Negative   < >   --    < >  Negative   SG  1.012  1.010   < >   --    < >  1.016   UBLD   "Negative  Negative   < >   --    < >  Small*   URINEPH  5.0  5.0   < >   --    < >  5.0   PROTEIN  30*  30*   < >   --    < >  30*   NITRITE  Negative  Negative   < >   --    < >  Negative   LEUKEST  Negative  Negative   < >   --    < >  Negative   RBCU  0  3*   < >   --    < >  1   WBCU  10*  7*   < >   --    < >  1   UTPG   --    --    --   1.55*   --   0.41*    < > = values in this interval not displayed.          7/21/2017   SPECIMEN(S):   A: Colon biopsy, ascending   B: Colon biopsy, descending     FINAL DIAGNOSIS:   A. COLON BIOPSY, ASCENDING:   - Colonic mucosa with no significant histologic abnormality   - Negative for intraepithelial lymphocytosis or deposition of   subepithelial thick collagenous band     B. COLON BIOPSY, DESCENDING:   - Crypt architecture distortion, consistent with healed prior injury   - Negative for active inflammation or dysplasia   - Negative for intraepithelial lymphocytosis or deposition of   subepithelial thick collagenous band   - Please, see comment     COMMENT:   Specimen B, \"descending colon\" features lamina propria edema, slight   variation in crypt sizes and shapes and occasional crypt drop-out.  This   may indicate a resolving injury which could be drug-induced or   infectious.       "

## 2017-08-17 NOTE — PLAN OF CARE
Problem: Goal Outcome Summary  Goal: Goal Outcome Summary  Outcome: No Change  A/Ox3; disoriented to time. VSS on RA. Dysphagia diet 3; tolerating liquids well. Assist x2. R PICC with Prograf gtt @ 2mL/hr. Insulin gtt @ 2 units/hr with Algorithm 2. BGs 119-157. Ileostomy with 425 mL output. 280 mL urine + one incontinent episode. R flank drains both irrigated; total of 160 mL output. NJ with TF @ 65 mL/hr. Denies pain and nausea. Will continue with plan of care.

## 2017-08-17 NOTE — PROGRESS NOTES
"[facility] Nurse Inpatient Adult Pressure INJURY (PI) Wound Assessment     Follow up assessment of PU(s) on pt's:  Ileostomy   Follow up assessment of PI on- left ear lobe and Sacrum      Data:   Patient History:      per MD note(s): This is a 51 year old male with complex PMH of CASTAÑEDA cirrhosis s/p Liver transplant Mar 2017.  Was admitted on 2014 with abdominal pain, sepsis, CT at OSH showed \"right colonic wall thickening suggesting colitis\" underwent Ex-Lap and washout for neutropenic colitis, intra-op was noted to have inflammed cecum and ascending colon with murky fluid.  Abdomen was left open at the time and was closed on 2017.  Some concern for CMV colitis with low count CMV viremia/GI PTLD and subsequently underwent colonoscopy on :  No colitis was seen on visualized portions of mucosa.     Current mattress:  Pulsate mattress   Current pressure relieving devices: Foam dressing, pt able to turn independently but rolls on his back often due to hip pain. Per RN he has been complaining of pain on various part of his body.    Moisture Management:  Ileostomy, incontinent of bladder at times      Current Diet / Nutrition:       Active Diet Order      Regular Diet Adult Thin Liquids (water, ice chips, juice, milk, gelatin, ice cream, etc)      Kwasi Score  Av.5  Min: 10  Max: 22       Labs:     Recent Labs   Lab Test  17   0621   17   0711   17   0949   17   0316   ALBUMIN  1.8*   < >   --    < >   --    < >   --    HGB  8.1*   < >  7.7*   < >   --    < >  7.1*   INR   --    --    --    --   1.19*   < >  1.80*   WBC  3.4*   < >  8.8   < >   --    < >  0.8*   A1C   --    --    --    --    --    --   Canceled, Test credited   Below Assay Range  NOTIFIED LEONARD ONEILL AT 0538 ON 17 BY CHRISSY     CRP   --    --   190.0*   < >   --    < >   --     < > = values in this interval not displayed.                                                                         "                                              Pressure Injury Assessment  (location #1):   Sacrum  Wound History:   Pt was transferred from  to  on 7/25 now transferred back to  on 7/29. Had multiple procedures throughout the day on 7/25/17 per RN. Pt continues to be confused. He refuses to turn at times and rolls on his back right away due to c/o pain on his hips.        Pic taken- 7/26/17                                                  Pic taken-8/10        Pic taken- 8/17/17    8/3/17- Unable to take picture today as pt wanted to roll on his back due to pain on his ribs  8/7/17- Unable to take picture today as pt not able to tolerate side lying due to pain on his hips  8/14/17- Phone not available to take picture today      Wound Base: 40 % moist yellow fibrinous tissue and 60% visible follicular buds    Specific Dimensions (length x width x depth, in cm) :   2.5 x 2.2 x0.2 cm     Tunneling:  N/A    Undermining: N/A    Palpation of the wound bed:  Firm on bone    Slough appearance: None    Eschar appearance:  none    Periwound Skin: Healing moisture associated dermatitis with pinpoint satellite lesions    Color: pink    Temperature  normal     Drainage: Moderate serous         Odor: none    Pain:  Painful    Pressure Injury Assessment  (location #2):   Left ear lobe   Wound History:   Initially noted this injury during assessing the pt for sacral pressure injury. Now able to turn his head independently        Pic taken 7/26/17                                                                 Pic taken- 8/3/17    Wound Base: dry superficial thin scab, healing    Specific Dimensions (length x width x depth, in cm) :   0.4x0.5x0.0cm    Tunneling:  N/A    Undermining: N/A    Palpation of the wound bed:  none    Slough appearance:  none    Eschar appearance:  none    Periwound Skin: intact     Color: pink    Temperature  normal     Drainage:  none         Odor: none    Pain:  none             Intervention:      Patient's chart evaluated.      Kwasi Interventions:  Current Kwasi Interventions and Care Plan reviewed and updated, appropriate at this time.    Wound was assessed.    Wound Care: was done: per POC mentioned below,    Orders  Written    Supplies  Reviewed    Discussed plan of care with Nurse           Assessment:     Pressure Injury (PI) located on Sacrum: DTPI  Status: wound  Follow up assessment, Symptomatic, Evolving    Left ear lobe- healing stage 2      Healing moisture associated dermatitis with fungal component on bilateral buttocks and inner thighs. Recommend to keep the catheter (condom catheter) to promote wound healing as pt continues to be confuse and his entire dressing has been wet previously.    Pt did not have catheter today, Noted dressing was completely wet. Spoke with RN to try Condom catheter again and dressing should be changed when its soiled with urine.      New ileostomy and Mucus fistula, stable.         Plan:     Nursing to notify the Provider(s) and re-consult the [facility] Nurse if wound(s) deteriorate(s).  Plan of care for wound located on Sacrum every other day and PRN :   Cleanse the area with NS and pat dry.  Apply No sting barrier to periwound skin.  Apply triad paste lightly only on open areas.  Cover wound with Sacral Mepilex (#[id number])  Change dressing every other day and PRN.  Turn and reposition Q 2hrs.  Ensure pt has Adolph-cushion while sitting up in the chair.  Ensure pt is on pulsate mattress.   FYI- Only use Covidien pad no  pad.      BL buttocks and inner thigh BID-     Cleanse the area very gently with soft cloth.    Apply LEXI antifungal cream (comes from pharmacy)    With repeat application, do not scrub the paste, only remove soiled paste and reapply.    If complete removal of paste is necessary use baby oil/mineral oil and soft wash cloth.    Plan of care for wound located on left ear lobe: Monitor the area closely.     [facility] Nurse will return: twice a week  [facility]  "[facility] Nurse Inpatient Ostomy Assessment       Follow up assessment of ostomy and needs for:  Ileostomy and Mucus fistula       Data:   History:    This is a 51 year old male with complex PMH of CASTAÑEDA cirrhosis s/p Liver transplant Mar 2017.  Was admitted on 7/4/2014 with abdominal pain, sepsis, CT at OSH showed \"right colonic wall thickening suggesting colitis\" underwent Ex-Lap and washout for neutropenic colitis, intra-op was noted to have inflammed cecum and ascending colon with murky fluid.  Abdomen was left open at the time and was closed on 7/5/2017.  Some concern for CMV colitis with low count CMV viremia/GI PTLD and subsequently underwent colonoscopy on 7/21:  No colitis was seen on visualized portions of mucosa.  Exam sub-optimal as colon filled with solid stool.  Random biopsies obtained.  On 7/25/2017 pt became more tachycardic, increasing O2 needs and altered, pt was intubated, hypotension responsive to IVFs, worsening kidney function.  Has not required pressors.  CT was obtained which showed a new large heterogeneous right sided retroperitoneal gas and air tracking along duodenum.  No contrast extravasation.  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  Despite conservative management with bowel rest and IV abx, pt continued to spike fevers.  Now s/p Exploratory laparotomy, right angelique-colectomy, end ileostomy, mucus fistula, partial omentectomy on 7/26.    Type of ostomy:  Ileostomy and Mucus fistula  Stoma assessment:   ? stoma:  11/4\" , viable, pink, pale, round, moist, and protruberant  ? A bridge in place?: No  ? Stent(s) in place?: Not applicable,   Mucocutaneous Junction (MCJ):  Intact   Peristomal skin:  Intact   Abdominal assessment: soft   ? N/G still in place?:  Yes   Output:  small, watery, green    I/O last 3 completed shifts:  In: 2287.5 [P.O.:480; I.V.:437.5; Other:50; NG/GT:215]  Out: 2770 [Urine:630; Drains:365; Stool:1775]     Current pouching system:  " Tohatchi, 57mm flat two piece, high output, cut to fit and flat and barrier ring with minimal melting around the cutting edges.   Pain:  Complaining of pain during cleansing  ? Is pt still on a PCA? No  Diet:       Active Diet Order      Regular Diet Adult Thin Liquids (water, ice chips, juice, milk, gelatin, ice cream, etc)            Intervention:     Patient's chart evaluated.      Assessments done today:  Stoma, peristomal skin, and learning needs  ? Participants: pt, his mother, and His wife Danica (ph no- [phone]). Pt continues to be confused and not able to participate.  ?  Educational intervention: method: His wife Danica performed return demo for pouch replacement using below mentioned supplies. She did a great job and feels confident to change the pouch.  Prepared for discharge by: No discharge preparations started         Assessment:     Stoma assessment: viable and healthy    Complications: None    Learning needs/ comprehension: Wife is attentive and willing to learn.    Is pt able to demonstrate: 1. how to empty their pouch?  No: Demonstrated to wife today  2. How to read and write down correct I and O's?   No: Demonstrated to wife today    Effectiveness of current pouching/ supply plan: > 72 hrs         Plan:     Plan:   ? Current pouching system: Tohatchi 57 mm  2 pc flat tra high output pouch with barrier ring    Education:  ? Educational needs: practice with How to empty pouch, Removal of pouch, Preparation of new pouch, Cutting out or evaluating a pattern, Applying paste or rings, Applying appliance to abdomen, Peristomal skin care, Diet and hydration / fluid balance, Odor / flatus management and Lifestyle Adjustments    ? Continue to prepare for discharge:  Supplies ordered, Completed supply list, Registered for samples from , Prescriptions or note left on chart for MD to sign/complete, Prepared for discharge to home with homecare, Discussed making a [facility] Nurse outpatient  appointment upon discharge, Discussed when to follow up with a WO Nurse in the future and Discussed how/ where to order supplies   ? Do WOC Nurse recommend home care?  Possible TCU   Plan for ileostomy and Mucus fistula: RN to change pouch twice a week. WO will initiate teaching once pt is stable.  Supplies Needed  Vida (2pc 57mm)  Wafer- (#[id number])  Pouch- (#[id number])  or  High output pouch - (#[id number]) depending upon output  Barrier ring- (#[id number])     Ileostomy care- Change pouch twice a week  1. Gather all supplies and remove old pouch gently.  2. Cleanse peristomal skin with w arm water and soft wash cloth or gauze and dry thoroughly.  3. Cut the wafer to fit to stoma or use given pattern, apply barrier ring around the cutting surface and apply centering the stoma.  4. Attach the pouch  5. Massage around it for better adherence.    Face to face time- 55 mins for ostomy teaching and sacral wound assessment  Follow up- M/Thurs

## 2017-08-18 ENCOUNTER — APPOINTMENT (OUTPATIENT)
Dept: SPEECH THERAPY | Facility: CLINIC | Age: 52
End: 2017-08-18
Attending: TRANSPLANT SURGERY
Payer: COMMERCIAL

## 2017-08-18 ENCOUNTER — APPOINTMENT (OUTPATIENT)
Dept: PHYSICAL THERAPY | Facility: CLINIC | Age: 52
End: 2017-08-18
Attending: TRANSPLANT SURGERY
Payer: COMMERCIAL

## 2017-08-18 LAB
ABO + RH BLD: NORMAL
ALBUMIN SERPL-MCNC: 1.7 G/DL (ref 3.4–5)
ALP SERPL-CCNC: 236 U/L (ref 40–150)
ALT SERPL W P-5'-P-CCNC: 21 U/L (ref 0–70)
ANION GAP SERPL CALCULATED.3IONS-SCNC: 6 MMOL/L (ref 3–14)
AST SERPL W P-5'-P-CCNC: 32 U/L (ref 0–45)
BASOPHILS # BLD AUTO: 0 10E9/L (ref 0–0.2)
BASOPHILS NFR BLD AUTO: 0.6 %
BILIRUB DIRECT SERPL-MCNC: 0.3 MG/DL (ref 0–0.2)
BILIRUB SERPL-MCNC: 0.5 MG/DL (ref 0.2–1.3)
BLD GP AB SCN SERPL QL: NORMAL
BLD GP AB SCN SERPL QL: NORMAL
BLD PROD TYP BPU: NORMAL
BLD PROD TYP BPU: NORMAL
BLD UNIT ID BPU: 0
BLOOD BANK CMNT PATIENT-IMP: NORMAL
BLOOD BANK CMNT PATIENT-IMP: NORMAL
BLOOD PRODUCT CODE: NORMAL
BPU ID: NORMAL
BUN SERPL-MCNC: 56 MG/DL (ref 7–30)
CALCIUM SERPL-MCNC: 8 MG/DL (ref 8.5–10.1)
CHLORIDE SERPL-SCNC: 116 MMOL/L (ref 94–109)
CO2 SERPL-SCNC: 21 MMOL/L (ref 20–32)
CREAT SERPL-MCNC: 1.13 MG/DL (ref 0.66–1.25)
DIFFERENTIAL METHOD BLD: ABNORMAL
EOSINOPHIL # BLD AUTO: 0 10E9/L (ref 0–0.7)
EOSINOPHIL NFR BLD AUTO: 0 %
ERYTHROCYTE [DISTWIDTH] IN BLOOD BY AUTOMATED COUNT: 17.5 % (ref 10–15)
GFR SERPL CREATININE-BSD FRML MDRD: 68 ML/MIN/1.7M2
GLUCOSE BLDC GLUCOMTR-MCNC: 118 MG/DL (ref 70–99)
GLUCOSE BLDC GLUCOMTR-MCNC: 119 MG/DL (ref 70–99)
GLUCOSE BLDC GLUCOMTR-MCNC: 121 MG/DL (ref 70–99)
GLUCOSE BLDC GLUCOMTR-MCNC: 123 MG/DL (ref 70–99)
GLUCOSE BLDC GLUCOMTR-MCNC: 126 MG/DL (ref 70–99)
GLUCOSE BLDC GLUCOMTR-MCNC: 130 MG/DL (ref 70–99)
GLUCOSE BLDC GLUCOMTR-MCNC: 139 MG/DL (ref 70–99)
GLUCOSE BLDC GLUCOMTR-MCNC: 201 MG/DL (ref 70–99)
GLUCOSE BLDC GLUCOMTR-MCNC: 210 MG/DL (ref 70–99)
GLUCOSE BLDC GLUCOMTR-MCNC: 218 MG/DL (ref 70–99)
GLUCOSE BLDC GLUCOMTR-MCNC: 254 MG/DL (ref 70–99)
GLUCOSE BLDC GLUCOMTR-MCNC: 267 MG/DL (ref 70–99)
GLUCOSE SERPL-MCNC: 127 MG/DL (ref 70–99)
HCT VFR BLD AUTO: 21.7 % (ref 40–53)
HGB BLD-MCNC: 6.9 G/DL (ref 13.3–17.7)
IMM GRANULOCYTES # BLD: 0 10E9/L (ref 0–0.4)
IMM GRANULOCYTES NFR BLD: 0.6 %
LYMPHOCYTES # BLD AUTO: 0.6 10E9/L (ref 0.8–5.3)
LYMPHOCYTES NFR BLD AUTO: 37.2 %
MAGNESIUM SERPL-MCNC: 1.7 MG/DL (ref 1.6–2.3)
MCH RBC QN AUTO: 28.6 PG (ref 26.5–33)
MCHC RBC AUTO-ENTMCNC: 31.8 G/DL (ref 31.5–36.5)
MCV RBC AUTO: 90 FL (ref 78–100)
MONOCYTES # BLD AUTO: 0.2 10E9/L (ref 0–1.3)
MONOCYTES NFR BLD AUTO: 14.6 %
NEUTROPHILS # BLD AUTO: 0.8 10E9/L (ref 1.6–8.3)
NEUTROPHILS NFR BLD AUTO: 47 %
NRBC # BLD AUTO: 0 10*3/UL
NRBC BLD AUTO-RTO: 0 /100
NUM BPU REQUESTED: 2
PHOSPHATE SERPL-MCNC: 3.4 MG/DL (ref 2.5–4.5)
PLATELET # BLD AUTO: 107 10E9/L (ref 150–450)
POTASSIUM SERPL-SCNC: 4.5 MMOL/L (ref 3.4–5.3)
PROT SERPL-MCNC: 6.1 G/DL (ref 6.8–8.8)
RBC # BLD AUTO: 2.41 10E12/L (ref 4.4–5.9)
SODIUM SERPL-SCNC: 143 MMOL/L (ref 133–144)
SPECIMEN EXP DATE BLD: NORMAL
SPECIMEN EXP DATE BLD: NORMAL
TACROLIMUS BLD-MCNC: 23.8 UG/L (ref 5–15)
TME LAST DOSE: ABNORMAL H
TRANSFUSION STATUS PATIENT QL: NORMAL
TRANSFUSION STATUS PATIENT QL: NORMAL
WBC # BLD AUTO: 1.6 10E9/L (ref 4–11)

## 2017-08-18 PROCEDURE — 25000132 ZZH RX MED GY IP 250 OP 250 PS 637: Performed by: PHYSICIAN ASSISTANT

## 2017-08-18 PROCEDURE — 97530 THERAPEUTIC ACTIVITIES: CPT | Mod: GP

## 2017-08-18 PROCEDURE — 25000132 ZZH RX MED GY IP 250 OP 250 PS 637: Performed by: TRANSPLANT SURGERY

## 2017-08-18 PROCEDURE — 40000225 ZZH STATISTIC SLP WARD VISIT

## 2017-08-18 PROCEDURE — 25000128 H RX IP 250 OP 636: Performed by: PHYSICIAN ASSISTANT

## 2017-08-18 PROCEDURE — 12000024 ZZH R&B TRANSPLANT CRITICAL

## 2017-08-18 PROCEDURE — 25000132 ZZH RX MED GY IP 250 OP 250 PS 637: Performed by: STUDENT IN AN ORGANIZED HEALTH CARE EDUCATION/TRAINING PROGRAM

## 2017-08-18 PROCEDURE — 25000131 ZZH RX MED GY IP 250 OP 636 PS 637: Performed by: PHYSICIAN ASSISTANT

## 2017-08-18 PROCEDURE — 27210913 ZZH NUTRITION PRODUCT INTERMEDIATE PACKET

## 2017-08-18 PROCEDURE — 80076 HEPATIC FUNCTION PANEL: CPT | Performed by: STUDENT IN AN ORGANIZED HEALTH CARE EDUCATION/TRAINING PROGRAM

## 2017-08-18 PROCEDURE — 86850 RBC ANTIBODY SCREEN: CPT | Performed by: STUDENT IN AN ORGANIZED HEALTH CARE EDUCATION/TRAINING PROGRAM

## 2017-08-18 PROCEDURE — 92526 ORAL FUNCTION THERAPY: CPT | Mod: GN

## 2017-08-18 PROCEDURE — 25000132 ZZH RX MED GY IP 250 OP 250 PS 637: Performed by: NURSE PRACTITIONER

## 2017-08-18 PROCEDURE — 36592 COLLECT BLOOD FROM PICC: CPT | Performed by: STUDENT IN AN ORGANIZED HEALTH CARE EDUCATION/TRAINING PROGRAM

## 2017-08-18 PROCEDURE — 40000802 ZZH SITE CHECK

## 2017-08-18 PROCEDURE — 27210432 ZZH NUTRITION PRODUCT RENAL BASIC LITER

## 2017-08-18 PROCEDURE — 25000132 ZZH RX MED GY IP 250 OP 250 PS 637: Performed by: COLON & RECTAL SURGERY

## 2017-08-18 PROCEDURE — 25000128 H RX IP 250 OP 636: Performed by: INTERNAL MEDICINE

## 2017-08-18 PROCEDURE — 25000125 ZZHC RX 250: Performed by: PHYSICIAN ASSISTANT

## 2017-08-18 PROCEDURE — P9016 RBC LEUKOCYTES REDUCED: HCPCS | Performed by: TRANSPLANT SURGERY

## 2017-08-18 PROCEDURE — 83735 ASSAY OF MAGNESIUM: CPT | Performed by: STUDENT IN AN ORGANIZED HEALTH CARE EDUCATION/TRAINING PROGRAM

## 2017-08-18 PROCEDURE — 00000146 ZZHCL STATISTIC GLUCOSE BY METER IP

## 2017-08-18 PROCEDURE — 25000128 H RX IP 250 OP 636: Performed by: STUDENT IN AN ORGANIZED HEALTH CARE EDUCATION/TRAINING PROGRAM

## 2017-08-18 PROCEDURE — 86900 BLOOD TYPING SEROLOGIC ABO: CPT | Performed by: STUDENT IN AN ORGANIZED HEALTH CARE EDUCATION/TRAINING PROGRAM

## 2017-08-18 PROCEDURE — 97116 GAIT TRAINING THERAPY: CPT | Mod: GP

## 2017-08-18 PROCEDURE — 25000128 H RX IP 250 OP 636

## 2017-08-18 PROCEDURE — 86901 BLOOD TYPING SEROLOGIC RH(D): CPT | Performed by: STUDENT IN AN ORGANIZED HEALTH CARE EDUCATION/TRAINING PROGRAM

## 2017-08-18 PROCEDURE — 25000128 H RX IP 250 OP 636: Performed by: NURSE PRACTITIONER

## 2017-08-18 PROCEDURE — 84100 ASSAY OF PHOSPHORUS: CPT | Performed by: STUDENT IN AN ORGANIZED HEALTH CARE EDUCATION/TRAINING PROGRAM

## 2017-08-18 PROCEDURE — 85025 COMPLETE CBC W/AUTO DIFF WBC: CPT | Performed by: STUDENT IN AN ORGANIZED HEALTH CARE EDUCATION/TRAINING PROGRAM

## 2017-08-18 PROCEDURE — 80197 ASSAY OF TACROLIMUS: CPT | Performed by: STUDENT IN AN ORGANIZED HEALTH CARE EDUCATION/TRAINING PROGRAM

## 2017-08-18 PROCEDURE — 40000193 ZZH STATISTIC PT WARD VISIT

## 2017-08-18 PROCEDURE — 25000132 ZZH RX MED GY IP 250 OP 250 PS 637: Performed by: SURGERY

## 2017-08-18 PROCEDURE — 80048 BASIC METABOLIC PNL TOTAL CA: CPT | Performed by: STUDENT IN AN ORGANIZED HEALTH CARE EDUCATION/TRAINING PROGRAM

## 2017-08-18 RX ORDER — DEXTROSE MONOHYDRATE 50 MG/ML
INJECTION, SOLUTION INTRAVENOUS
Status: COMPLETED
Start: 2017-08-18 | End: 2017-08-18

## 2017-08-18 RX ORDER — HEPARIN SODIUM 5000 [USP'U]/.5ML
5000 INJECTION, SOLUTION INTRAVENOUS; SUBCUTANEOUS EVERY 8 HOURS
Status: DISCONTINUED | OUTPATIENT
Start: 2017-08-18 | End: 2017-08-29

## 2017-08-18 RX ORDER — FLUORIDE TOOTHPASTE
15 TOOTHPASTE DENTAL 4 TIMES DAILY
Status: DISCONTINUED | OUTPATIENT
Start: 2017-08-18 | End: 2017-09-08 | Stop reason: HOSPADM

## 2017-08-18 RX ADMIN — DEXTROSE MONOHYDRATE: 50 INJECTION, SOLUTION INTRAVENOUS at 13:09

## 2017-08-18 RX ADMIN — LOPERAMIDE HYDROCHLORIDE 4 MG: 2 SOLUTION ORAL at 12:56

## 2017-08-18 RX ADMIN — LOPERAMIDE HYDROCHLORIDE 4 MG: 2 SOLUTION ORAL at 15:52

## 2017-08-18 RX ADMIN — HUMAN INSULIN 2 UNITS/HR: 100 INJECTION, SOLUTION SUBCUTANEOUS at 06:28

## 2017-08-18 RX ADMIN — INSULIN ASPART 6 UNITS: 100 INJECTION, SOLUTION INTRAVENOUS; SUBCUTANEOUS at 18:37

## 2017-08-18 RX ADMIN — LOPERAMIDE HYDROCHLORIDE 4 MG: 2 SOLUTION ORAL at 21:03

## 2017-08-18 RX ADMIN — TACROLIMUS 4 MG: 1 CAPSULE ORAL at 08:45

## 2017-08-18 RX ADMIN — PIPERACILLIN AND TAZOBACTAM 3.38 G: 3; .375 INJECTION, POWDER, LYOPHILIZED, FOR SOLUTION INTRAVENOUS; PARENTERAL at 02:30

## 2017-08-18 RX ADMIN — Medication 1 PACKET: at 19:54

## 2017-08-18 RX ADMIN — PIPERACILLIN AND TAZOBACTAM 3.38 G: 3; .375 INJECTION, POWDER, LYOPHILIZED, FOR SOLUTION INTRAVENOUS; PARENTERAL at 08:46

## 2017-08-18 RX ADMIN — Medication 1 PACKET: at 19:56

## 2017-08-18 RX ADMIN — MICONAZOLE NITRATE: 20 CREAM TOPICAL at 09:00

## 2017-08-18 RX ADMIN — Medication 5 ML: at 08:45

## 2017-08-18 RX ADMIN — LOPERAMIDE HYDROCHLORIDE 4 MG: 2 SOLUTION ORAL at 08:45

## 2017-08-18 RX ADMIN — MICONAZOLE NITRATE: 20 CREAM TOPICAL at 19:54

## 2017-08-18 RX ADMIN — OXYCODONE HYDROCHLORIDE 5 MG: 5 SOLUTION ORAL at 18:35

## 2017-08-18 RX ADMIN — Medication 80 MG: at 08:45

## 2017-08-18 RX ADMIN — HEPARIN SODIUM 5000 UNITS: 5000 INJECTION, SOLUTION INTRAVENOUS; SUBCUTANEOUS at 19:53

## 2017-08-18 RX ADMIN — Medication 250 MG: at 19:54

## 2017-08-18 RX ADMIN — FLUDROCORTISONE ACETATE 0.1 MG: 0.1 TABLET ORAL at 08:45

## 2017-08-18 RX ADMIN — ASCORBIC ACID TAB 250 MG 250 MG: 250 TAB at 08:45

## 2017-08-18 RX ADMIN — Medication 15 ML: at 12:56

## 2017-08-18 RX ADMIN — Medication 1 PACKET: at 14:24

## 2017-08-18 RX ADMIN — HEPARIN SODIUM 5000 UNITS: 5000 INJECTION, SOLUTION INTRAVENOUS; SUBCUTANEOUS at 12:56

## 2017-08-18 RX ADMIN — SODIUM BICARBONATE 650 MG TABLET 1300 MG: at 08:45

## 2017-08-18 RX ADMIN — TACROLIMUS 4 MG: 1 CAPSULE ORAL at 17:32

## 2017-08-18 RX ADMIN — Medication 5 ML: at 12:56

## 2017-08-18 RX ADMIN — SODIUM BICARBONATE 650 MG TABLET 1300 MG: at 19:53

## 2017-08-18 RX ADMIN — Medication 250 MG: at 08:46

## 2017-08-18 RX ADMIN — PANTOPRAZOLE SODIUM 40 MG: 40 TABLET, DELAYED RELEASE ORAL at 08:45

## 2017-08-18 RX ADMIN — Medication 1 PACKET: at 08:46

## 2017-08-18 RX ADMIN — SODIUM BICARBONATE 650 MG TABLET 1300 MG: at 14:14

## 2017-08-18 RX ADMIN — Medication 220 MG: at 08:45

## 2017-08-18 RX ADMIN — Medication 15 ML: at 15:52

## 2017-08-18 RX ADMIN — Medication 300 MCG: at 12:56

## 2017-08-18 RX ADMIN — INSULIN ASPART 13 UNITS: 100 INJECTION, SOLUTION INTRAVENOUS; SUBCUTANEOUS at 14:20

## 2017-08-18 RX ADMIN — Medication 5 ML: at 15:52

## 2017-08-18 RX ADMIN — PIPERACILLIN AND TAZOBACTAM 3.38 G: 3; .375 INJECTION, POWDER, LYOPHILIZED, FOR SOLUTION INTRAVENOUS; PARENTERAL at 20:30

## 2017-08-18 RX ADMIN — INSULIN ASPART 9 UNITS: 100 INJECTION, SOLUTION INTRAVENOUS; SUBCUTANEOUS at 09:48

## 2017-08-18 RX ADMIN — AMLODIPINE BESYLATE 5 MG: 10 TABLET ORAL at 08:45

## 2017-08-18 RX ADMIN — PIPERACILLIN AND TAZOBACTAM 3.38 G: 3; .375 INJECTION, POWDER, LYOPHILIZED, FOR SOLUTION INTRAVENOUS; PARENTERAL at 14:15

## 2017-08-18 RX ADMIN — Medication 15 ML: at 19:55

## 2017-08-18 RX ADMIN — GANCICLOVIR SODIUM 450 MG: 500 INJECTION, POWDER, LYOPHILIZED, FOR SOLUTION INTRAVENOUS at 17:33

## 2017-08-18 RX ADMIN — Medication 5 ML: at 21:03

## 2017-08-18 NOTE — PLAN OF CARE
Problem: Goal Outcome Summary  Goal: Goal Outcome Summary     SLP: Pt seen for dysphagia tx. Pt consumed trial of regular solids with no overt s/s of aspiration, although minimal in amount. Pt reports he continues to struggle with appetite. Encouraged PO with new menu items since advancement. Pt appropriate to remain on Regular diet and thin liquids. Encourage upright as possible, slow pace and small sips/bites. Encourage PO. No further skilled SLP intervention indicated during IP stay. Recommend cognitive-linguistic evaluation at TCU to aid in discharge planning/recommendations given significant length of hospital stay and debility.      Speech Language Therapy Discharge Summary     Reason for therapy discharge:    All goals and outcomes met, no further needs identified.     Progress towards therapy goal(s). See goals on Care Plan in [facility] electronic health record for goal details.  Goals met     Therapy recommendation(s):    Continued therapy is recommended.  Rationale/Recommendations:  cognitive-linguistic evaluation at next level of care.

## 2017-08-18 NOTE — PROVIDER NOTIFICATION
Critical Tacrolimus drug level 23.8 reported by lab. Discussed with patient's RN who reported to provider; provider and pharmacy believe the level may be a false high as labs were drawn from a line that previously had a tacrolimus drip infusion. Patient is asymptomatic; denies headache or tremor. Plan to re-draw level tomorrow AM via venipuncture.

## 2017-08-18 NOTE — PLAN OF CARE
Problem: Individualization  Goal: Patient Preferences  Outcome: No Change  Patient had a good night. Went for a long walk around nursing desk with PT/RN, tolerated well; however did not want to do any more activity throughout the shift. Patient used tylenol x1 for pain relief. No complaints of nausea, only fullness after large volumes of medications/fiber/protein supplements. Prograf drip discontinued at 1800 after PO dose given. Patient states he will need to run this change by the doctors tomorrow, but agreed to take the 4mg capsules. Patient encouraged to order dinner tonight with change to regular diet with thin liquids. Explained that the texture will no longer be altered and there are no more restrictions on potassium or fluid. Patient is disbelief and hesitant to do any PO intake. -154 on algorithm #4. Patient using urinal independently, good urine output. Continues on BA and VPM, patient has not made any impulsive moves tonight or pulled at lines; however did not need to call for anything this shift. Will continue to evaluate need for VPM.

## 2017-08-18 NOTE — PLAN OF CARE
Problem: Goal Outcome Summary  Goal: Goal Outcome Summary  Outcome: Improving  [id number]: Patient continues to be hypertensive, not requiring hydralazine. Other VSS and afebrile. He received oxycodone x1 for pain. Patient denies nausea. He tolerated a regular diet with a fair diet. He was started on calorie counts. He is voiding spontaneously and adequate amounts. Ileoileostomy is patent and draining. Both abdominal drains are patent and draining. They were flushed twice today. Insulin drip was discontinued this morning. Blood sugars since were 201 and 267. Patient has been up in the recliner multiple times this shift. He is up with assist of 2. Tube feeds continue at 65cc/hr via his NJ tube. Patient received 1 unit of red blood cells for a hgb of 6.9. He also received Neupogen for a WBC of 1.6. Will continue to monitor and update the team as needed.

## 2017-08-18 NOTE — PROGRESS NOTES
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  Now s/p ex lap with R hemicolectomy, end ileostomy, mucus fistula, partial omenectomy on 7/26.    IV insulin rate around 2units/h for several hours overnight with BG in the 110s-120s.  Eating more today.    Plan:  Orders placed to transition off IV insulin:  -Glargine 43 units q24h with instructions to dc IV insulin 2 hours after glargine is given.  -meal aspart 1unit/7g CHO ordered for meals and snacks  -correction aspart: high intensity [id number]  -monitor glucose q4h.    Will continue to follow  Please notify diabetes team of changes planned for nutrition support schedule.     Outpatient diabetes follow up: Dr. Eckert at [facility]                   Interval History:   8/7/17 Pt upset about inpatient diabetes mgmt consult.  Transplant contacted pt's outpatient endocrinologist Dr. Eckert of [facility]-- no issue with inpatient team managing pt's glucose    TFs are documented as running at goal rate since yesterday morning: Nepro at 65ml/h.  IV insulin rates stable at 2 units/h for second half of night and this morning.   Minimal po intake, although it appears Camacho just had >90g CHO midday (this is the most he has consumed in several days to weeks).  Camacho had no complaints except for reports of low appetite.      Recent Labs  Lab 08/18/17  1203 08/18/17  1005 08/18/17  0703 08/18/17  0538 08/18/17  0502 08/18/17  0433 08/18/17  0317  08/17/17  0632  08/16/17  0650  08/15/17  0639  08/14/17  0621  08/13/17  0631   GLC  --   --   --  127*  --   --   --   --  128*  --  129*  --  112*  --  133*  --   113*   * 210* 126*  --  123* 121* 119*  < >  --   < >  --   < >  --   < >  --   < >  --    < > = values in this interval not displayed.            Review of Systems:   See interval hx          Medications:   PTA taking Januvia and glargine versus glargine and aspart per carb    Active Diet Order      Regular Diet Adult Thin Liquids (water, ice chips, juice, milk, gelatin, ice cream, etc)     Physical Exam:  Gen: alert, resting in bed, NAD.  HEENT: mucous membranes are dry.  Resp: normal, on RA  Neuro: intermittently confused    /87 (BP Location: Left arm)  Pulse 100  Temp 97.6  F (36.4  C) (Oral)  Resp 28  Ht 1.829 m (6')  Wt 96.9 kg (213 lb 9.6 oz)  SpO2 96%  BMI 28.97 kg/m2           Data:     Lab Results   Component Value Date    A1C  07/06/2017     Canceled, Test credited   Below Assay Range  NOTIFIED LEONARD ONEILL AT 0538 ON 7/6/17 BY CHRISSY      A1C 9.4 02/16/2017    A1C 6.2 12/14/2016    A1C 6.1 10/27/2016    A1C 8.3 07/02/2012            Recent Labs   Lab Test  08/18/17   0538  08/17/17   0632   NA  143  142   POTASSIUM  4.5  4.0   CHLORIDE  116*  116*   CO2  21  18*   ANIONGAP  6  8   GLC  127*  128*   BUN  56*  61*   CR  1.13  1.23   JACOB  8.0*  8.2*     CBC RESULTS:   Recent Labs   Lab Test  08/18/17   0538   WBC  1.6*   RBC  2.41*   HGB  6.9*   HCT  21.7*   MCV  90   MCH  28.6   MCHC  31.8   RDW  17.5*   PLT  107*     Giovana Villasenor PA-C [phone]  Diabetes Management job code 1309

## 2017-08-18 NOTE — PLAN OF CARE
Problem: Goal Outcome Summary  Goal: Goal Outcome Summary  Outcome: No Change  A/Ox3; disoriented to time. VSS on RA. Up with assist x2. VPM in use. Repositioned Q2H. Regular diet with thin liquids; carb coverage insulin given for snacks/supplements appropriately. BGs 118-146; insulin gtt on 2 units/hr on Algorithm 4; BG checks Q2H. NJ with TF @ 65mL/hr. Ileostomy with 650 mL liquid output. R flank drains both irrigated, both with serous output. Urine output 950 mL. Pt c/o back pain, but denied pain medication. Denies nausea. Will continue with plan of care.

## 2017-08-18 NOTE — PROGRESS NOTES
"CLINICAL NUTRITION SERVICES - REASSESSMENT NOTE     Nutrition Prescription    Malnutrition Status:    Severe malnutrition in the context of acute on chronic illness    Recommendations already ordered by Registered Dietitian (RD):  Calorie counts (8/18-8/21)  Trial of Ensure Plus and Ensure Clear supplements    Future/Additional Recommendations:  Consider increasing Nutrisource Fiber packets to further bulk stool if necessary     EVALUATION OF THE PROGRESS TOWARD GOALS   Diet: Regular with thin liquids  Nutrition Support: Nepro @ 65 ml/hr + ProSource TF 3 pkts/day + Nutrisource Fiber 2 pkts/day    Intake: Seems interested in eating this morning.  Mainly interest in \"light\" items.      NEW FINDINGS   Ileostomy output somewhat decreased, although has had less TF running over the last few days.  Appears liquid, green.    MALNUTRITION  % Intake: < 75% for > 7 days (non-severe)  % Weight Loss: > 5% in 1 month (severe)  Subcutaneous Fat Loss: Facial region and Thoracic/intercostal: Moderate   Muscle Loss: Temporal, Facial & jaw region, Thoracic region (clavicle, acromium bone, deltoid, trapezius, pectoral), and Upper leg (quadricep, hamstring): moderate to severe  Fluid Accumulation/Edema: Does not meet criteria  Malnutrition Diagnosis: Severe malnutrition in the context of acute on chronic illness    Janice Salomon MS, RD, LD  Pager [phone]    "

## 2017-08-18 NOTE — PLAN OF CARE
Problem: Goal Outcome Summary  Goal: Goal Outcome Summary  OT 7A: Pt with another provided upon attempt and unable to check back. Will reschedule forward.

## 2017-08-18 NOTE — PROGRESS NOTES
[facility]  Transplant Infectious Diseases Progress Note      Camacho Bhagat MRN# [id number]   YOB: 1965 Age: 51 year old   Date of Service: 8/15/2017        Assessment and Recommendations:     Recommendations:  - Continue empiric Zosyn for now the right retroperitoneal abscess, intraperitoneal gas foci in setting of ongoing ascites, and apparent secondary peritonitis   - If patient remains stable through over next 48 hours ->would favor switch to oral regimen of Levofloxacin 750mg qday + Metronidazole 500mg TID on Sunday 8/20  - Ascites culture from 8/11 -> after 6 days broth growing GPRs -> likely clostridia vs corynebacterium, current anti-microbial coverage appropriate, will continue to follow  - Ascites culture from 8/15 -> NGTD  - EBV blood PCR repeated 8/15/17 down-trending 406,697 -> 6864 -> 753   - CMV quant 8/17 undetectable (has now been undetectable x3 on 8/3, 8/10, 8/17) -> recommend decreasing IV Ganciclovir to 5mg/kg  - If CMV quant undetectable on repeat in 1 week will consider switch to PO Valcyte at that time   - Leukopenia likely d/t Ganciclovir, recommend continuing Ganciclovir as above for now  - Received G-CSF 300mcg x1 on 8/18 ->  Recheck ANC in am and ANC <1.0, dose with additional 300mcg G-CSF  - Continue TMP-SMX    Transplant ID will continue to follow.    Patient seen and discussed with attending physician, Dr. Del Toro.     Reshma Concepcion MD  Internal Medicine, PGY-3  [phone]    Assessment:  A 51 year old gentleman immunosuppressed (tacrolimus; MMF on hold; received basiliximab initial induction) s/p liver transplant on 3/4/17 for CASTAÑEDA who was admitted 7/4/17 with colitis and underwent 7/26/17 exploratory laparotomy after forming a RUQ phlegmon possibly due to colonic perforation.  The initial admission diagnosis was neutropenic colitis, but new primary seroconversion CMV viremia was discovered on 7/10/17.  He had persistent fevers from 7/10 -  15/17 and again from 7/21 - 8/4/17, was afebrile for three+ days, but spiked renewed fevers beginning  8/7/17 late evening with increased malaise and abdominal pain, increased delirium, rising inflammatory markers, and a higher peripheral WBC.  Repeat 8/9/17 abdominal CT scan showed right sided free air.  A RUQ retroperitoneal abscess drain was placed by IR on 8/9/17 and broad-spectrum antibiotic therapy resumed (Linezolid + Zosyn)-- since then he has improved and remains clinically stable as of 8/18 with no recurrent decompensation. Repeat 8/14 abdominal CT scan showed new intraperitoneal free air, peritoneal pigtail catheter placed by IR 8/15. Has remained stable since that time. Linezolid discontinued 8/15. Remains on IV Zosyn.     ID issues:  # Renewed peritoneal sepsis / RUQ abdominal abscess noted on 8/9 s/p RP drain placement (8/9), unchanged on 8/14  # Persistent Ascites with new foci intraperitoneal gas on CT 8/14 s/p peritoneal drain 8/15  Off antimicrobials (except TMP-SMX and Valcyte) for five days (8/4 - 8/8), after completing a ten day 7/25 - 8/3/17 empiric course of meropenem / daptomycin / micafungin.  On 8/9 pt again developed sepsis with a markedly increased procalcitonin, increased serum CRP, increased abdominal pain, and worsened confusion / obtundation.  8/9/17 chest / abdomen CT scan demonstrated little evidence of new infection above the diaphragm, but new infra-hepatic free air and an unchanged (versus the 8/4/17 abdominal CT scan) right retroperitoneal RUQ multifocal gas collection suggestive for an abscess.  Interventional Radiology placed a drain into that abscess 8/9 -- cultures have grown only a Clostridium species in the anaerobic broth culture.  Other potential sites for his sepsis beyond the abdomen were lacking and blood cultures remain negative.  On resumed empiric antibiotic therapy with Zosyn / linezolid since 8/9/17. Sepsis resolved. All this has occured is in the wake of an  exploratory laparotomy on 7/26/17 with a right angelique-colectomy / end ileostomy / mucous fistula / partial omentectomy -- intra-operatively no overt perforation was seen at that time, but right colitis was observed with a probable intra-adominal abscess or phlegmon (thought possibly due to a colonic perforation which was seen on the excised colonic histopathology). All cultures done negative.  While the sepsis seems presently controlled with drainage and broad-spectrum empiric antibiotics, his course, ongoing ascites fluid cell counts evidence of peritonitis (although ascites fluid cultures are suppressed on antibiotics), and the presence of Clostridium in the 8/9/17 anaerobic drainage culture implies that he very well may have an ongoing enterostomic leak into his peritoneum.  Repeat CT abd/pelvis done 8/14 demonstrates stable to mildly improved RP fluid collection, ongoing ascites, and a new foci of intraperitoneal gas that may be attributed to redistribution of peritoneal gas vs. gas producing infection vs perforation or dehiscence of bowel. These CT findings further support concern for ongoing enterostomic leak. Following 8/14 CT results,  patient went for sinogram and additional drain placement 8/15. ID services continues to be concerned that drains alone will not provide adequate source control if there is an enterostomic leak; however patient is extremely high risk for re-operation and surgeons would prefer to manage with drains and aniti-microbials and continue to monitor.  Would recommend further imaging studies to help determine presence of leak. Of note, Linezolid discontinued 8/15 and patient has remained stable. Currently remains on IV Zosyn with plans to transition to PO regimen in ~48 hours if patient remains clinically stable.  Of note, ascitic fluid from 8/11 growing GPRs in broth only after 6 days, suspect either corynebacterium (which is inconsequential) or recurrent growth of Clostridia (grew in  "8/9 culture). Regardless, current antibiotics are appropriately covering and no changes needed. Will continue to follow.     # CMV viremia /  CMV colitis:    He presented with a \"detected\" low level CMV viremia (detected < 137 IU / ml) upon admission and developed a progressively worsening viremia after stopping his Valcyte prophylaxis.  Valcyte was re-started on 7/15/17 when the blood CMV PCR viral load was 4,225 IU / ml (3.6 log) and that was switched to ganciclovir on 7/20/17 with a viral load of 1,906 IU /ml (3.3 log). After one week of GCV treatment, the blood CMV PCR dropped to 290 IU / ml (2.5 log) on 7/27/17 and decreased further to < 137 IU / ml (< 2.1 log) on 8/3/17 (a > 1 log drop in the viremia over two weeks).  The CMV immunostain of colonic tissue biopsy was positive; confirming a CMV etiology of his colitis.  Because of an initial slow pace of viremia decline, he had been treated with supra-high dose ganciclovir (doubled to 10 mg / kg / dose BID) for the possibility of ganciclovir resistance, but with the improving viral load on 8/3/17, his ganciclovir dose was reduced to 7.5 mg / kg IV BID on 8/4/17 PM.  On that, the 8/10/17 repeat CMV viral load was again undetectable, so he was decreased to \"high maintenance\" ganciclovir dosing at 7.5 mg / kg / day on 8/11/17.  Weekly blood CMV PCR viral load checks (next due to be checked 8/17/17) will continue to make certain they remain suppressed at this maintenance dose.  (A CMV drug resistance genotype assay ordered on 7/27/17 went astray, so we have no genotypic data to help with decisions.) Repeat CMV viral load 8/17 again not detectable (now marks 3 consecutive undetectable levels on 8/3, 8/10,8/17). Decreasing dose to 5mg/kg given repeat negative load; will consider transition to PO Valcyte if repeat CMV quant in 1 week remains undetectable.     # Hx Pancytopenia / Recurrent Leukopenia:   Patient has known hx pancytopenia in the past, attributed to " Valcyte and Bactrim. Has ongoing chronic anemia and thrombocytopenia this admission, though leukopenia had resolved while intermittently septic with normal WBC during most of admission. Over past 72 hours beginning 8/12 patient has had leukopenia; no differential completed during that time so unclear if neutropenic. Suspect this is due to IV Ganciclovir, especially given that patient has received supra-therapeutic doses this admission given c/f low level CMV resistance to ganciclovir. As patient is recovering from CMV viremia and remains at high risk for recurrence, Ganciclovir should be continued regardless. Pt received 1 dose G-CSF 300mcg on 8/18 for ANC 0.8; will continue to follow ANC and re-dose as needed.      # Encephalopathy:  His delirium and somnolence seem to vary with the state of his overall health and abdominal sepsis, so likely represent toxic-metabolic encephalopathy associated with sepsis and also somewhat with medications side effects superimposed on chronic hepatic encephalopathy.  A 7/20/17 brain MRI scan was unremarkable and a repeat brain MRI has not yet been deemed needed.  An 8/5/17 serum cryptococcal antigen assay was negative.  The likelihood of any CNS infection at this time is exceeding low and additional CNS evaluation is presently not needed. Mentation seems to be clearing significantly as of 8/17.     # Improved EBV viremia:  Initially discovered to have EBV viremia at 406,697  / ml on 7/18/17.  With decreased immunosuppression, that had fallen to 6,864  / ml by 8/1/17.  Checking blood EBV PCRs every couple weeks is reasonable for now.  Random colonic biopsies from the 7/21/17 colonoscopy and 7/26/17 hemicolectomy histopathology results were not suggestive for PTLD. Repeat EBV PCR sent 8/15, value continues to downtrend (406,697 -> 6864 -> 753).     # S/p liver transplant:  Continue on tacrolimus; but MMF was held 6/27/17 in the context of sepsis and pancytopenia    # Recurrent  "EJ (resolving)   Patient initially with EJ attributed to severe sepsis, Cr normalized to near 1 but is no up-trending again ~1.8. Possibly d/t CNI toxicity (Tacro level 8.2 and dose decreased 8/14) and also d/t ?ongoing infection. Nephrology consulted, agree with maintaining hydration given high ostomy outputs and close monitoring of Tacro level. Cr has been down-trending and is back at baseline as of 8/18.     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  Nevertheless, he received a full ten day (7/25 - 8/3/17) daptomycin course for severe sepsis in the setting of colonization with VRE and was on Linezolid empirically, from 8/9-8/15.   -  Staphylococcus capitis in ascitic fluid 7/5/17:  Was culture contamination.   - Rhinovirus in BAL 7/15/2017:  Likely to be due to chronic shedding, since his main presentation was abdominal, not respiratory.    Other ID issues:  - PCP prophylaxis: TMP-SMX was held in 6/17 due to neutropenia, but resumed 8/4/17.  - Serostatus:  CMV D+/R-, EBV D+/R-, HSV1?/2?, VZV ?.  Presently on IV ganciclovir.  - Immunization status: up-to-date.  - Gamma globulin status: >1660 as of 7/24/2017  - Isolation: Contact isolation for a history of VRE carriage.    Interval History:  No acute events overnight. Afebrile, hemodynamically stable. Patient is slowly transitioning to increased PO, and has started taking more meds PO and eating more advanced diet. States he ate a large part of a meal last night and this morning, and notices his \"stomach muscles\" a bit more tender today, particularly on the right. Still making loose stool, urinating appropriately. Denies fevers, chills, rigors. Otherwise no complaints. Of note, currently receiving 1 unit PRBCs for Hb 6.9, also received 300mcg Neupogen for ANC 0.8 this am. No clear source of bleeding " other than blood tinged drainage in drains bilaterally. Continues to have drain output from both (80cc from RP drain on 8/17, 310 cc from peritoneal drain on 8/17).       [facility] (copied from the 8/4/17 Transplant ID Note):   A 51 year old gentleman with PMH of DM, HTN, fibromyalgia, previous DVT with IVC filter,  s/p liver transplant secondary to ESLD due to CASTAÑEDA on 03/04/2017, CMV D+/R-, EBV D+/R-, who was admitted on 07/04 due to neutropenic enterocolitis starting empiric therapy with zosyn + flagyl + vancomycin + micafungin being transferred to the ICU, requiring exploratory laparotomy + wash out for neutropenic enterocolitis on 07/04, reporting inflamed cecum and ascending colon, having a second look on 07/05 finding improvement of the inflammation and performing closure of abdominal incision; culture resulted S. Capitis considered a contaminant. Valcyte was stopped since admission. On 07/06, flagyl + vancomycin + micafungin were stopped and zosyn changed to ertapenem. As per ID note from 07/06, recommended to switch back ertapenem to zosyn since thrombocytopenia was seen before zosyn treatment, pancytopenia possibly related to medication (MMF, bactrim, valcyte, initially seen at the beginning of June) and recommended to monitor CMV PCR weekly ( on 07/03: positive < 137; before on 06/26 was ND). Additionally, on 07/12 a BAL was repeated and showed VRE/CONS/P putida/C. Albicans, all were considered a colonizers, and primary team continued with ertapenem, held MMF, bactrim, valcyte). On 07/13, it was recommended to re-start valcyte since his CMV PCR was 145 and counts improved. Patient persisted febrile, with evident ascitis tapped but culture resulted negative. Course complicated with thrombosis of the right arterial artery with ischemia to the tip of the ischemic finger. On 07/15, BAL was repeated and was positive for rhinovirus. Valcyte was re-initiated on 07/15 due to CMV PCR:  4220. PAtient was discharged from the ICU on 07/17. Also, 14 days of ertapenem were completed on 07/18 and EBV PCR was taken on 07/18 and resulted [id number] concerning of PTLD since patient persisted febrile with ascitis and colitis. On 07/20, ZAKIA was switched to GCV due to worsening level of 1906. Patient became encephalopathic but MRI resulted negative. On 07/21 a colonoscopy was performed and showed normal colonic mucosa; random biopsies were taken. On 07/25, patient deteriorated clinically presenting respiratory failure requiring intubation, so was transferred to ICU and started empiric treatment with meropenem + daptomycin + micafungin, and GCV dose was increased to 10 mg BID. A CT abdomen was repeated and showed retroperitoneal air so underwent EL + lysis of adhesions + right hemicolectomy + ileostomy + partial omentectomy  due to ascending colitis (no neida perforation or ischemia). On 07/27 CMV PCR resulted 290. The initial admission diagnosis was neutropenic colitis, but new primary seroconversion CMV viremia was discovered on 7/10/17.  He had persistent fevers from 7/10 - 15/17 and again from 7/21 - 8/4/17, was afebrile for three+ days, but has spiked renewed fevers (up to 100.7 degrees) since 8/7/17 late evening and is looking septic with increased malaise and abdominal pain, obtundation, increased delirium, rising inflammatory markers, and a higher peripheral WBC.  Repeat 8/9/17 abdominal CT scan showed right sided free air.  A RUQ retroperitoneal abscess drain was placed by IR on 8/9/17 and broad-spectrum antibiotic therapy resumed -- since then he has improved. He has been on empiric Zosyn plus linezolid (for his history of VRE) since 8/9/17 (as well as ongoing IV ganciclovir since 7/20/17 and TMP-SMX prophylaxis since 8/4/17).  He has subsequently been afebrile with down-trending WBC (now leukopenic). All other clinical symptoms improving (decreasing abdominal pain, slowly improving obtundation, slightly  more interactive though remains disoriented. Is able to hold conversation though easily confused.  Other than 8/9/17 drainage anaerobic culture growing a Clostridium species in broth (at 48 hours incubation) all other recent culture data remains NGTD.  CT abd/pelvis 8/14 notable for new foci of intraperitoneal gas not present on CT 8/9 as well as ongoing moderate ascites and RP fluid collection with migrated drain. IR placed peritoneal drain 8/15, Linezolid discontinued 8/15.     Transplants:  3/4/2017 (Liver)  Review of Systems:  CONSTITUTIONAL: no fevers, chills, sweats  EYES: No eye pain, visual changes, or scleral icterus.  ENT:  No rhinorrhea, sinus pain, otalgia, hearing loss, tinnitus, sore throat, or oral pain.  Left ear lobe pressure ulcer.  LYMPH:  No edema.  RESPIRATORY:  No cough, increased sputum, or dyspnea (on intermittent low flow oxygen).  CARDIOVASCULAR:  No chest pain, palpitations.  GASTROINTESTINAL:  + right sided abd pain,, Significant ascites.  S/p liver transplant.  Dysphagia.  Liquid stool in his ileostomy.  No nausea, vomiting, or constipation.  GENITOURINARY:  Improved EJ.  Crowe in place draining clear yellow urine,  HEME:  Chronic anemia. Recurrent leukopenia, No easy bruising.  ENDOCRINE:  Type 2 diabetes mellitus on insulin.  MUSCULOSKELETAL:  Necrotic toe tips.  Generally deconditioned.  Coccygeal decub.  No new focal myalgias / arthralgias.  SKIN:  No rash or pruritus.  NEURO:  A/O x2 (self, place), able to hold clear conversation about clinical status,  No headache.  PSYCH:  Negative.    Past Medical / Surgical History:  Past Medical History:   Diagnosis Date     Cancer (H)      Depressive disorder, not elsewhere classified      Esophageal reflux      Fibromyalgia 1/2009    dx with Dr Benitez( Rheum)     History of thrombophlebitis      Osteoarthritis      Other acute embolism veins 11/01    Deep vein thrombophlebitis, filter placed     Other and unspecified hyperlipidemia       Other chronic nonalcoholic liver disease     Fatty liver      Other testicular hypofunction      Pneumonia, organism 10-01    Included ARDS, sepsis, and  acute renal failure; hospitalized     Rheumatoid arthritis(714.0)      Type II or unspecified type diabetes mellitus without mention of complication, not stated as uncontrolled     Managed by endocrinology     Unspecified essential hypertension     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     Unspecified sleep apnea     Uses BiPAP     Past Surgical History:   Procedure Laterality Date     BENCH LIVER N/A 3/4/2017    Procedure: BENCH LIVER;  Surgeon: Jovan Tran MD;  Location: [facility] OR     C NONSPECIFIC PROCEDURE      tracheostomy     C NONSPECIFIC PROCEDURE      repair of deviated septum     C NONSPECIFIC PROCEDURE      Rt knee arthroscopy     C TOTAL KNEE ARTHROPLASTY      Right knee arthroscopy     CHOLECYSTECTOMY       COLONOSCOPY N/A 2017    Procedure: COMBINED COLONOSCOPY, SINGLE OR MULTIPLE BIOPSY/POLYPECTOMY BY BIOPSY;  Colonoscopy;  Surgeon: Izaiah Montes MD;  Location:  GI     ESOPHAGOSCOPY, GASTROSCOPY, DUODENOSCOPY (EGD), COMBINED N/A 2016    Procedure: COMBINED ESOPHAGOSCOPY, GASTROSCOPY, DUODENOSCOPY (EGD), BIOPSY SINGLE OR MULTIPLE;  Surgeon: Trent Pederson MD;  Location:  GI     LAPAROTOMY EXPLORATORY N/A 2017    Procedure: LAPAROTOMY EXPLORATORY;  Exploratory Laparotomy, washout;  Surgeon: Tip Zhang MD;  Location: [facility] OR     LAPAROTOMY EXPLORATORY N/A 2017    Procedure: LAPAROTOMY EXPLORATORY;  Exploratory Laparotomy, Washout with closure.;  Surgeon: Tip Zhang MD;  Location: [facility] OR     LAPAROTOMY EXPLORATORY N/A 2017    Procedure: LAPAROTOMY EXPLORATORY;  Exploratory Laparotomy, Right angelique-colectomy, end ileostomy, mucosal fistula, partial omentectomy;  Surgeon: Sara Dinh MD;  Location: [facility] OR     TRANSPLANT LIVER RECIPIENT  DONOR N/A  3/4/2017    Procedure: TRANSPLANT LIVER RECIPIENT  DONOR;  Surgeon: Jovan Tran MD;  Location: [facility] OR     Current Scheduled Medications:    fludrocortisone  0.1 mg Oral Daily     tacrolimus  4 mg Oral BID IS     amLODIPine  5 mg Oral Daily     sodium chloride (PF)  10 mL Irrigation Q8H     fiber modular  1 packet Per Feeding Tube BID     sodium chloride (PF)  20 mL Irrigation Q6H     diphenoxylate-atropine  5 mL Oral 4x Daily     saccharomyces boulardii  250 mg Oral BID     sodium bicarbonate  1,300 mg Per NG tube TID     ascorbic acid  250 mg Oral Daily     ganciclovir (CYTOVENE) intermittent infusion  7.5 mg/kg Intravenous Q24H     miconazole with skin protectant   Topical BID     piperacillin-tazobactam  3.375 g Intravenous Q6H     multivitamin CF formula  5 mL Per Feeding Tube Daily     zinc sulfate  220 mg Per Feeding Tube Daily     loperamide  4 mg Oral or Feeding Tube 4x Daily     insulin aspart   Subcutaneous TID w/meals     protein modular  1 packet Per Feeding Tube TID     aspirin  80 mg Oral Daily     pantoprazole  40 mg Oral or Feeding Tube Daily     sodium chloride (PF)  3 mL Intracatheter Q8H     Physical Examination:  Vital sign ranges over the past 24 hours:  Temp:  [97.8  F (36.6  C)-99.3  F (37.4  C)] 98.4  F (36.9  C)  Pulse:  [] 96  Heart Rate:  [] 98  Resp:  [17-22] 20  BP: (147-175)/(84-94) 165/90  SpO2:  [95 %-97 %] 96 %    Intake/Output Summary (Last 24 hours) at 17 1705  Last data filed at 17 1700   Gross per 24 hour   Intake           2283.2 ml   Output             1705 ml   Net            578.2 ml     Physical Examination:  GENERAL:  Awake, able to hold general conversation, 51 year old man supine in bed in NAD.  EYES:  EOMI, anicteric sclerae.  ENT:  No otorrhea.  NJ tube present.   No anterior oral lesion.  NECK:  Supple.  LYMPH:  No cervical lymphadenopathy.  LUNGS:  Few bilaterally scattered coarse rhonchi loudest in bases on anterior  auscultation   CARDIOVASCULAR:  Regular rate and rhythm, normal S1, S2, without murmur, gallop, or rub.  ABDOMEN:  Normal bowel sounds, soft, slight generalized tenderness, worst on right side, mildly distended, midline stapled wound lacks inflammation with dressed superior mucous fistula, RLQ ileostomy with liquid stool, right TABITHA drain with serous drainage, RLQ pigtail catheter in place draining reddish blood tinged fluid     :  Crowe with hardik urine.  EXTREMITIES:  Distally warm, no edema,  ischemic right hallux and right second toe, also ischemic tip of left second toe, and right index, no ulcers.  Right PICC line site lacks inflammation.  NEUROLOGIC:  Responding, oriented x 2, moves extremities x 4.    Inflammatory Markers    Recent Labs   Lab Test  08/16/17   0650  08/09/17   0711  08/07/17   0549  08/06/17   0633  08/05/17   0616  07/05/17   1810  03/05/17   0358  11/23/16   0813   08/15/11   1151  04/11/11   1209  01/03/11   1246  02/15/10   1232   SED   --    --    --    --    --    --    --   45*   --   11  14  17*  25*   CRP  140.0*  190.0*  130.0*  130.0*  140.0*  354.0  20.0*  58.0*   < >  11.1*  9.0*  11.7*  17.5*    < > = values in this interval not displayed.     Immune Globulin Studies     Recent Labs   Lab Test  07/24/17   0705  08/15/11   1243   IGG  1660*  1160   IGG1   --   734   IGG2   --   294   IGG3   --   7*   IGG4   --   59     Metabolic Studies       Recent Labs   Lab Test  08/18/17   0538  08/17/17   1308  08/17/17   0632  08/16/17   0832  08/16/17   0650  08/15/17   0639  08/14/17   0621   08/13/17   0631   08/01/17   0651   07/25/17   0945   07/06/17   0316   NA  143   --   142   --   142  140  143   --   142   < >  150*   < >  137   < >  143   POTASSIUM  4.5   --   4.0   --   4.0  4.3  4.4   --   4.5   < >  4.3   < >  4.0   < >  5.0   CHLORIDE  116*   --   116*   --   117*  115*  114*   --   116*   < >  123*   < >  104   < >  116*   CO2  21   --   18*   --   16*  18*  16*   --   17*    < >  20   < >  26   < >  18*   ANIONGAP  6   --   8   --   9  8  13   --   9   < >  7   < >  7   < >  9   BUN  56*   --   61*   --   65*  75*  71*   --   66*   < >  51*   < >  77*   < >  62*   CR  1.13   --   1.23   --   1.50*  1.87*  1.92*   --   1.94*   < >  0.90   < >  2.60*   < >  2.75*   GFRESTIMATED  68   --   62   --   49*  38*  37*   --   36*   < >  89   < >  26*   < >  24*   GLC  127*   --   128*   --   129*  112*  133*   --   113*   < >  141*   < >  382*   < >  139*   A1C   --    --    --    --    --    --    --    --    --    --    --    --    --    --   Canceled, Test credited   Below Assay Range  NOTIFIED LEONARD ONEILL AT 0538 ON 7/6/17 BY EAANTOLIN     JACOB  8.0*   --   8.2*   --   8.3*  8.1*  8.4*   --   8.4*   < >  8.1*   < >  7.6*   < >  6.9*   PHOS  3.4   --   3.9   --   4.4  4.0  5.1*   --   5.6*   < >  3.1   < >   --    < >  5.5*   MAG  1.7   --   1.9   --   1.8  1.9  2.1   --   2.1   < >  1.9   < >   --    < >  2.1   LACT   --   1.2   --   1.2   --    --    --    < >   --    < >   --    < >   --    < >  1.5   CKT   --    --    --    --    --    --    --    --    --    --   16*   --   40   --    --     < > = values in this interval not displayed.     Hepatic Studies    Recent Labs   Lab Test  08/17/17   0632  08/14/17   0621  08/10/17   1323  08/10/17   0704  08/08/17   0600  08/02/17   0543  07/27/17   0410   07/25/17   0017   07/11/17   0328   BILITOTAL  0.6  0.7   --   2.2*  0.5  0.4  1.3   < >   --    < >  0.5   ALKPHOS  254*  271*   --   136  172*  199*  143   < >   --    < >  158*   ALBUMIN  1.7*  1.8*   --   1.9*  2.1*  2.3*  2.4*   < >   --    < >  1.8*   AST  33  24   --   36  28  62*  27   < >   --    < >  34   ALT  21  17   --   26  25  42  27   < >   --    < >  27   LDH   --    --   168   --    --    --    --    --   258*   --    --    GGT   --    --    --    --    --    --    --    --    --    --   58    < > = values in this interval not displayed.     Pancreatitis testing    Recent Labs    Lab Test  08/16/17   0650  07/24/17   0705  07/17/17   0630  07/12/17   0342  07/10/17   0340  07/05/17   0346  03/05/17   0358  03/03/17   1458  02/21/17   1520  12/09/16   1159  02/08/16   1144   09/30/10   1734   AMYLASE   --    --    --    --    --    --   53  70   --    --    --    --   82   LIPASE  55*   --    --    --    --    --   216   --   326  161   --    --   138   TRIG   --   303*  168*  114  177*  174*   --    --    --    --   99   < >   --     < > = values in this interval not displayed.     Hematology Studies      Recent Labs   Lab Test  08/18/17   0538  08/17/17   0632  08/16/17   0650  08/15/17   0639  08/14/17   0621  08/13/17   0631   08/07/17   0549   08/06/17   0633   08/05/17   0616   WBC  1.6*  1.9*  2.0*  2.7*  3.4*  3.3*   < >  5.5   --   5.2   --   6.3   ANEU  0.8*  1.1*   --   1.6   --    --    --   3.7   --   3.5   --   3.9   ALYM  0.6*  0.6*   --   0.7*   --    --    --   1.5   --   1.5   --   2.2   ZINA  0.2  0.3   --   0.3   --    --    --   0.2   --   0.2   --   0.2   AEOS  0.0  0.0   --   0.0   --    --    --   0.1   --   0.0   --   0.0   HGB  6.9*  7.3*  6.9*  7.4*  8.1*  8.4*   < >  7.7*   < >  7.6*   < >  6.9*   HCT  21.7*  22.8*  21.2*  23.3*  25.2*  26.4*   < >  23.7*   --   23.2*   --   20.9*   PLT  107*  107*  102*  118*  129*  156   < >  116*   --   94*   --   94*    < > = values in this interval not displayed.     Arterial Blood Gas Testing    Recent Labs   Lab Test  08/10/17   1515  08/02/17   1216  07/26/17   2243  07/26/17   2133   07/26/17 2017 07/25/17   1746   PH  7.33*  7.37  Incorrect specimen type  NOTTIED BY WOJCIECH DEWITT, NEW ORDER TO REPLACE.7/26/17 AT 2346 BY ZAINAB.  CORRECTED ON 07/26 AT 2350: PREVIOUSLY REPORTED AS 7.27     --    --   Canceled, Test credited   Incorrect specimen type    7.32*   PCO2  34*  31*  Incorrect specimen type  NOTTIED BY WOJCIECH DEWITT, NEW ORDER TO REPLACE.7/26/17 AT 2346 BY ZAINAB.  CORRECTED ON 07/26 AT 2350: PREVIOUSLY REPORTED AS  45     --    --   Canceled, Test credited   Incorrect specimen type    42   PO2  68*  69*  Incorrect specimen type  NOTTIED BY WOJCIECH DEWITT, NEW ORDER TO REPLACE.7/26/17 AT 2346 BY ZAINAB.  CORRECTED ON 07/26 AT 2350: PREVIOUSLY REPORTED AS 53     --    --   Canceled, Test credited   Incorrect specimen type    81   HCO3  18*  18*  Incorrect specimen type  NOTTIED BY WOJCIECH DEWITT, NEW ORDER TO REPLACE.7/26/17 AT 2346 BY ZAINAB.  CORRECTED ON 07/26 AT 2350: PREVIOUSLY REPORTED AS 20     --    --   Canceled, Test credited   Incorrect specimen type    22   O2PER  2L  21  Incorrect specimen type  NOTTIED BY WOJCIECH DEWITT, NEW ORDER TO REPLACE.7/26/17 AT 2346 BY ZAINAB.  CORRECTED ON 07/26 AT 2350: PREVIOUSLY REPORTED AS 40    40  62   < >  100.0  100  40.0    < > = values in this interval not displayed.     Urine Studies     Recent Labs   Lab Test  08/10/17   1752  08/08/17   1600  08/05/17   0617  07/28/17   1042  07/25/17   1205   URINEPH  5.0  5.0  5.0  5.0  5.0   NITRITE  Negative  Negative  Negative  Negative  Negative   LEUKEST  Negative  Negative  Negative  Negative  Trace*   WBCU  10*  7*  5*  6*  5*     Vancomycin Levels     Recent Labs   Lab Test  07/06/17   0316  10/28/16   1405   VANCOMYCIN  22.1  14.4     Procalcitonins:  8/17 2.35  8/12 9.21  8/9 7.55  8/8 3.11  8/5 1.06  8/2 0.78    Microbiology:  8/15 Ascites fluid cultures: pending   8.15 Ascites Fluid: WBC 59624, PMNs 92%, Glucose 15, LDh 2669  8/11 Ascites cx:  Day 6 possible gram positive rods growing in broth only Gram stain:  NOS, many WBCs (predominately PMNs).  Anaerobic / fungal cxs NGTD.  Fluid analysis:  Yellow, cloudy, WBC 5,038 (83% N, 1% L, 16% M), protein 4.0, , glucose 5, amylase 19, bilirubin 1.2.  8/11 Ur cx NGTD  8/9 Right retroperitoneal abscess drainage aerobic cx:  NGTD.  Gram stain:  NOS, many WBCs (predominately PMNs).  Anaerobic cx:  Clostridium sps isolated in broth at 48 hours of incubation.  8/9 x 2, 8/4 x 2, 8/2 x 2, 7/31 x 2,  7/28 x 2, 7/25 x 2, 7/15 Bld cxs NGTD / negative  8/5 ascites aerobic / anaerobic / fungal cxs NGTD.  Gram stain:  NOS, few WBCs (predominately PMNs), moderate RBCs.  Fluid analysis:  Yellow, slightly cloudy,  (91%N, 6% L, 3% M), albumin 1.6, protein 3.8.  8/5, 7/25 Ur cxs:  Negative  8/5 Serum CRAG negative  7/29 Sp cx:  Normal elvie, heavy C albicans  7/26 Ascites (OR) cx:  Negative.   Gram stain:  NOS, few WBCs.  7/25 Ascites cx: Negative.  Gram stain:  NOS, no WBCs  7/15 BAL fluid studies:  Yeast and Rhinovirus  7/12 BAL fluid studies:  Single colony of VRE, C albicans/dubliniensis   7/11 Sp cx:  VRE and Coag Neg Staph   7/5 Ascites cx:  S capitis    CMV viral loads    Recent Labs   Lab Test  08/17/17   0632  08/10/17   0704  08/03/17   0533  07/27/17   1109  07/20/17   1427   CSPEC  Plasma, EDTA anticoagulant  EDTA PLASMA  CORRECTED ON 08/11 AT 0714: PREVIOUSLY REPORTED AS Whole Blood    EDTA PLASMA  EDTA PLASMA  CORRECTED ON 07/28 AT 0840: PREVIOUSLY REPORTED AS Blood    Plasma   CMVLOG  Not Calculated  <2.1  <2.1  2.5*  3.3*     CMV viral loads    Log IU/mL of CMVQNT   Date Value Ref Range Status   08/17/2017 Not Calculated <2.1 [Log_IU]/mL Final   08/10/2017 <2.1 <2.1 [Log_IU]/mL Final   08/03/2017 <2.1 <2.1 [Log_IU]/mL Final   07/27/2017 2.5 (H) <2.1 [Log_IU]/mL Final   07/20/2017 3.3 (H) <2.1 [Log_IU]/mL Final   07/18/2017 3.0 (H) <2.1 [Log_IU]/mL Final   07/15/2017 3.6 (H) <2.1 [Log_IU]/mL Final   07/10/2017 2.2 (H) <2.1 [Log_IU]/mL Final   07/03/2017 <2.1 <2.1 [Log_IU]/mL Final   06/26/2017 Not Calculated <2.1 [Log_IU]/mL Final     CMV resistance testing    EBV DNA Copies/mL   Date Value Ref Range Status   08/15/2017 753 (A) EBVNEG^EBV DNA Not Detected [Copies]/mL Final   08/01/2017 6864 (A) EBVNEG [Copies]/mL Final   07/18/2017 581949 (A) EBVNEG [Copies]/mL Final     Pathology:   Colectomy path 7/26/17  - Colonic wall with perforation, edema, and acute serositis   - Background colonic mucosa with  no significant histologic abnormality   - CMV immunostain is positive in rare (3) cells   - Terminal ileum with no significant histologic abnormality   - Viable, unremarkable surgical margins   - One benign lymph node (0/1)   - Appendix with fibrous obliteration of the tip     Colon biopsies 2017   A: Colon biopsy, ascending   B: Colon biopsy, descending   FINAL DIAGNOSIS:   A. COLON BIOPSY, ASCENDING:   - Colonic mucosa with no significant histologic abnormality   - Negative for intraepithelial lymphocytosis or deposition of   subepithelial thick collagenous band   B. COLON BIOPSY, DESCENDING:   - Crypt architecture distortion, consistent with healed prior injury   - Negative for active inflammation or dysplasia   - Negative for intraepithelial lymphocytosis or deposition of   subepithelial thick collagenous band     Cytology of ascitic fluid 2017   PERITONEAL FLUID, ASCITES:   -Negative for malignancy   Cytology of ascitic fluid 2017.   Peritoneal fluid, ascites:   - Negative for malignancy   - Acute and chronic inflammation present   Ascites fluid:  Rare to absent viable B cells.  (This specimen was collected on 17 but was not ordered/delivered to   lab/run until 17 - results should be interpreted with caution due   to low cellularity/viability.   Due to limited cellularity we were only able to analyze the B cells.   There is no immunophenotypic evidence of B cell non-Hodgkin lymphoma.   Neoplastic cells, including large cells, may not survive specimen   processing. This sample may not be representative. Final interpretation   requires correlation with morphologic and clinical features.)    Imagin/15: IR RLQ 12 Fr pigtail drain placed in intraperitoneum, 60 cc fluid sent for labs and culture    CT abd/pelvis: moderate volume ascites stable with increased foci of intra-peritonal gas, thickened peritoneum c/w peritonitis, complex right RP gas collection stable to slightly decrease  since 8/9, stable loculated bibasilar pleural effusions and bibasilar opacities, postsurgical changes of R hemicolectomy and several loops borderline dilated bowel   8/11 Paracentesis performed by IR.  8/10 CXRs:  Right PICC.  Increasing pulmonary edema.  Left retrocardiac and basilar opacities, atelectasis and/or infection.  Small right pleural effusion.  8/10 Abdm U/S:  Small to moderate volume complex ascites visualized, greatest in the lower quadrants, left greater than right. Right retroperitoneal air again noted.  Right-sided pleural effusion.  8/9 CT-guided retroperitoneal abscess drainage performed by IR.  8/9 Chest / abdm CT:  Unchanged right retroperitoneal air with new foci of free intraperitoneal air in the right upper quadrant. No extravasation of oral contrast identified.  No significant change in large volume abdominal ascites, thickened peritoneum and diffuse bowel wall thickening concerning for peritonitis.  Small right and trace left pleural effusions with overlying atelectasis and patchy consolidations. Overall, the consolidations are slightly increased since the prior exam. Differential includes atelectasis or infection.  Secondary signs of portal hypertension similar to the prior exam.  Unchanged mild bilateral renal pelvocaliectasis.  8/8 CXR:  Increased bibasilar streaky opacities, compatible with atelectasis and/or infection.  Probable small bilateral pleural effusions.  Persistent low lung volumes.  8/5 Ultrasound-guided paracentesis performed by IR:  250 ccyellow-brown fluid sent for analysis.  8/4 Abdm CT:  Heterogenous area of right-sided retroperitoneal gas has mildly decreased in size (versus 7/25/17 scan).  Moderate to severe ascites with diffuse peritoneal enhancement.  This may represent diffuse infection, for example bacterial peritonitis.  Mild residual foci of free air in the abdomen is likely postsurgical.  Diffuse small bowel and colonic wall thickening, similar to prior, likely  reactive to ascites/peritonitis.  Findings of portal hypertension including splenomegaly, ascites, esophageal and upper abdominal varices.  Small right pleural effusion with associated atelectasis. Improved left pleural effusion and basilar consolidation.  8/4 CXR:  Decreased lung volumes with improving perihilar and bibasilar opacities, which may represent atelectasis, pulmonary edema, or infection.  8/2 CXR: Decreased lung volumes with mildly increased perihilar and bibasilar opacities, which may represent pulmonary edema, atelectasis, or infection.  Small right pleural effusion.  8/2 BLE U/S:  Right leg: Normal RONAL. Mildly Abnormal TBI, suggestive of small vessel disease. Patent right lower extremity arteries without evidence of hemodynamically significant stenosis.  Left leg: Normal RONAL. Abnormal TBI, suggestive of small vessel disease. Patent left lower extremity arteries without evidence of hemodynamically significant stenosis.  7/31 CXR:  Improving perihilar and bibasilar opacities likely represent infection/aspiration, atelectasis, or pulmonary edema.  Moderate opacities bilaterally persist.  Stable small to moderate pleural effusions bilaterally.  7/28 CXR:  Interval removal of the endotracheal tube.  Increased perihilar opacities, worsening infection versus pulmonary edema.  Increased bilateral moderate pleural effusions with overlying atelectasis/consolidation.  7/25 Chest / abdm CT:  New large heterogeneous area of right-sided retroperitoneal gas which is highly concerning for an infectious process.  Given the history of prior neutropenic colitis and biopsies, there could also be a component of stool from a ruptured viscus, despite the lack of  surrounding bowel loops and no extraluminal oral contrast. Surgical consultation is recommended. Bibasilar atelectasis versus consolidation with small bilateral pleural effusions.  Extensive mesenteric and soft tissue edema with large volume ascites. Numerous  mesenteric and retroperitoneal lymph nodes which are presumably reactive.  Postsurgical changes of liver transplant.  7/20 Brain MRI:  Significant image degradation due to motion artifact, with no definite acute intracranial pathology. No abnormal contrast enhancing intracranial lesions.  Mucosal thickening in the sphenoid and maxillary sinuses and left greater than right mastoid fluid are nonspecific in the setting of recent intubation.  7/13 Chest / abdm CT:  Improved moderate right-sided pleural effusion and resolution of left-sided pleural effusion. There remain large areas of atelectasis/consolidation in both lungs, including in the left lower lobe despite resolution of left-sided pleural effusion. Superimposed infectious process cannot be excluded.  Moderate-large amount of ascites increased from 7/8/2017, which makes it difficult to evaluate for the presence or absence of inflammatory change or infectious process in the abdomen or pelvis. No intra-abdominal abscess.  Stable small presumed hematoma within the ventral abdominal wall fat.  Splenomegaly.

## 2017-08-18 NOTE — PROGRESS NOTES
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 Admitted 7/4/17 from [facility] for evaluation and management of sepsis secondary to colitis, taken to OR for initial exploratory laparotomy with findings of typhlitis in the right colon, wound left open with wound vac in place for reexploration and interval closure on 7/5/2017. Concern for CMV colitis with low count CMV viremia/GI PTLD and subsequently underwent colonoscopy on 7/21, random biopsies obtained.  On 7/25/2017 patient became septic with abdominal compartment syndrome. CT noted new large heterogeneous right sided retroperitoneal gas and air tracking along duodenum and underwent a exploratory laparotomy, right angelique-colectomy, end ileostomy, mucus fistula, partial omentectomy on 7/26 by colorectal surgery.  Antibiotics discontinued 8/3. 8/6 became septic with increased abd pain, confusion. 8/8 right retroperitoneal drain placed. Additional peritoneal drain placed 8/15.    Graft Function: Good function. LFTs acceptable (checking every Monday, Thursday).   Immunosuppression Management:   CellCept discontinued (6/27/17) due to neutropenia.   Prograf goal ~5-6 due to sepsis.  Unable to obtain detectable level with suspension.  Level 8/15, 6.6 tacrolimus drip was continued at 110/hr. Convert to oral capsule 8/17, 4mg BID pending level this AM  Cardiorespiratory: Stable respiratory status, NLB on RA. Assist with aggressive pulmonary toilet. OOB to chair and ambulate as tolerated.   HTN:-175, Continue amlodipine 5mg suspension. Pt on daily Florinef  Hematology: Pancytopenia present on admission, persists. Continue to hold MMF.   Primary CMV infection  Anemia- Hemoglobin 6.9. Transfusing 1 unit today.  Last blood transfusion on 8/16  Leukopenia-worsening. WBC 1.6, ANC 0.8. Will plan for Neupogen 300mcg X1 today.   GI:   -Neutropenic colitis on  admission. Colonoscopy 7/21. Biopsy: resolving injury secondary to possible drug induced vs infectious.   -Bowel perforation: 7/25 CT scan abd/pelvis-new large heterogeneous right sided retroperitoneal gas and air tracking along duodenum.  No contrast extravasation.  No intraperitoneal air noted. Colorectal surgery performed Ex lap, right angelique-colectomy, end ileostomy, mucus fistula, partial omentectomy on 7/26. Findings no neida perforation, phlegmon/inflammation right colon. Colon pathology suggested colon perforation, negative for malignancy; CMV stain positive.    -Retroperitoneal abscess/ascites peritonitis: CT A/P 8/9 showed a persistent gas/fluid collection RLQ, peritonitis, some free air but no evidence of contrast extravasation. 8/11: Diag. Para WBC 5,038. Repeat CT scan abd/pelvis 8/14 - Complex gas-containing fluid collection in the right retroperitoneum decreased slightly in size. Moderate ascites, with intraperitoneal gas, peritonitis noted.  IR placed additional drain to abdomen with cloudy/turbid fluid. No leak identified on CT scan.   -Diet:  Regular diet with thin liquids + Nepro NJ feeds @65/hr. Initiate calorie counts.  -High output  ileostomy:  Goal ileostomy output ~ 1200 cc in 24 hrs.  Output 1.3L yesterday.  Loperamide 4mg QID/ Lomotil QID/Fiber BID.  Fluid/Electrolytes/Renal:   -Dehydration: Initiate biotene/good oral hygiene for dry mouth    -EJ: on admission, d/t ATN sec to sepsis. SCR improved 1.13   -Hypernatremia: resolved with free water.    -Hyperkalemia: continue florinef daily  Endocrine: DM type 2. Endocrine consulting for glycemic management. Consider DC insulin gtt today.  Infectious disease: Afebrile.  WBC1.6. ID following.    -Retroperitoneal abscess: CT C/A/P 8/9 showed a persistent gas/fluid collection RLQ, peritonitis, some free air but no evidence of contrast extravasation.  IR placed RP drain, growing clostridium- on zosyn. Per ID, if stable over the next 48 hrs,  transition to Levofloxacin 750mg qday + Metronidazole 500mg TID on Sunday 8/20.  -CMV viremia: Primary CMV infection.  (CMV R-D+) CMV colitis per pathology, peak serum 7/15 4225 IU/ml.   IV Ganciclovir (started 7/20). Follow CMV PCR weekly, now <137 x 3 weeks.  Last level 8/17. Decrease Ganciclovir to 5mg/kg, consider po valcyte if next week negative.  -EBV viremia:  Peak serum 406,697 copies/ml on 7/18.  Down to 6864 as of 8/1.  Check weekly, now improved to 753 (8/15).  -R/o SBP or abscess:  8/11 paracentesis.  WBC 5,038, cx negative.  Repeat 8/15-fluid cloudy/purulent with cc=15,680.  Resolved issues:  -Severe sepsis: On admission, secondary to right colon phlegmon. Meropenem/daptomycin/micafungin tx x 10 days completed on 8/3. S/p right angelique-colectomy.  Path: CMV stain +, perforation of colon noted. Drain cultures with clostridium septicum.  Neuro: Toxic metabolic encephalopathy secondary to infection, prolonged hospital stay. Improving,virtual sitter present.   Vascular:  Evidence of microvascular injury in digits (toes) this is most likely due to injury while patient was on pressor therapy with sepsis.  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   Activity: Deconditioned due to prolong hospital course. Daily PT/OT, encourage pt to be OOB to chair. Encourage pulmonary toilet.   Prophylaxis: DVT-restart Heparin SQ  Disposition: 7A.     Medical Decision Making: Medium    PILLO/Fellow/Resident Provider:   Radha Osorio/DAYO Pino      Faculty: Tip Zhang M.D.      __________________________________________________________________  Transplant History:.  3/4/2017 DD OLT with Dr. Tran (Liver), Postoperative day: 167     Interval History:   Overnight events: No acute events overnight.more alert/awake today. Reports tolerating food/diet.  Up to chair and ambulating.    ROS:   A 10-point review of systems was negative except as noted above.    Meds:    filgrastim  (NEUPOGEN/GRANIX) intravenous  300 mcg Intravenous Once     fludrocortisone  0.1 mg Oral Daily     tacrolimus  4 mg Oral BID IS     amLODIPine  5 mg Oral Daily     sodium chloride (PF)  10 mL Irrigation Q8H     fiber modular  1 packet Per Feeding Tube BID     sodium chloride (PF)  20 mL Irrigation Q6H     diphenoxylate-atropine  5 mL Oral 4x Daily     saccharomyces boulardii  250 mg Oral BID     sodium bicarbonate  1,300 mg Per NG tube TID     ascorbic acid  250 mg Oral Daily     ganciclovir (CYTOVENE) intermittent infusion  7.5 mg/kg Intravenous Q24H     miconazole with skin protectant   Topical BID     piperacillin-tazobactam  3.375 g Intravenous Q6H     loperamide  4 mg Oral or Feeding Tube 4x Daily     insulin aspart   Subcutaneous TID w/meals     protein modular  1 packet Per Feeding Tube TID     aspirin  80 mg Oral Daily     pantoprazole  40 mg Oral or Feeding Tube Daily     sodium chloride (PF)  3 mL Intracatheter Q8H       Physical Exam:     Admit Weight: 94.2 kg (207 lb 11.2 oz) (SCALE 2)    Current vitals:   BP (!) 155/93 (BP Location: Left arm)  Pulse 92  Temp 98.3  F (36.8  C) (Oral)  Resp 18  Ht 1.829 m (6')  Wt 91.8 kg (202 lb 6.4 oz)  SpO2 94%  BMI 27.45 kg/m2    Vital sign ranges:    Temp:  [97.8  F (36.6  C)-99.3  F (37.4  C)] 98.3  F (36.8  C)  Pulse:  [] 92  Heart Rate:  [] 92  Resp:  [17-22] 18  BP: (147-165)/(84-93) 155/93  SpO2:  [94 %-97 %] 94 %  Patient Vitals for the past 24 hrs:   BP Temp Temp src Pulse Heart Rate Resp SpO2   08/18/17 0726 (!) 155/93 98.3  F (36.8  C) Oral 92 92 18 94 %   08/18/17 0429 165/90 98.4  F (36.9  C) Oral - 98 - 96 %   08/18/17 0026 (!) 153/93 97.9  F (36.6  C) Oral - 101 20 95 %   08/17/17 2006 (!) 152/92 98.5  F (36.9  C) Oral 96 - 17 96 %   08/17/17 1559 147/84 99.3  F (37.4  C) Oral 100 100 18 95 %   08/17/17 1230 157/85 97.8  F (36.6  C) Oral - 101 22 97 %     General Appearance: NAD  Skin: normal, warm, dry  Heart: RRR  HEENT: dry oral  cavity/chapped lips, no oral ulcerations, no Thrush  Lungs: nonlabored respirations on RA  Abdomen: soft, rounded, more tender. Ileostomy with brown output. Mucus fistula covered. Midline incision, c/d/i.  Right abdominal Drains: 20F-s/s, 12F -bilious/purulent  : Crowe present.  Extremities: No edema.  Necrotic blackened areas on right 1st & 2nd toes and left 2nd toe.  Neurologic: A&O x 3, speech appropriate      Data:   CMP    Recent Labs  Lab 08/18/17  0538 08/17/17  0632 08/16/17  1100 08/16/17  0650  08/14/17  0621    142  --  142  < > 143   POTASSIUM 4.5 4.0  --  4.0  < > 4.4   CHLORIDE 116* 116*  --  117*  < > 114*   CO2 21 18*  --  16*  < > 16*   * 128*  --  129*  < > 133*   BUN 56* 61*  --  65*  < > 71*   CR 1.13 1.23  --  1.50*  < > 1.92*   GFRESTIMATED 68 62  --  49*  < > 37*   GFRESTBLACK 82 75  --  59*  < > 45*   JACOB 8.0* 8.2*  --  8.3*  < > 8.4*   MAG 1.7 1.9  --  1.8  < > 2.1   PHOS 3.4 3.9  --  4.4  < > 5.1*   LIPASE  --   --   --  55*  --   --    ALBUMIN  --  1.7*  --   --   --  1.8*   BILITOTAL  --  0.6  --   --   --  0.7   ALKPHOS  --  254*  --   --   --  271*   AST  --  33  --   --   --  24   ALT  --  21  --   --   --  17   FAMY  --   --  10  --   --   --    FLIPA  --   --  34  --   --   --    < > = values in this interval not displayed.  CBC    Recent Labs  Lab 08/18/17  0538 08/17/17  0632   HGB 6.9* 7.3*   WBC 1.6* 1.9*   * 107*     COAGS  No lab results found in last 7 days.    Invalid input(s): XA   Urinalysis  Recent Labs   Lab Test  08/10/17   1752  08/08/17   1600   06/14/17   1508   04/11/16   1345   COLOR  Yellow  Yellow   < >   --    < >  Yellow   APPEARANCE  Slightly Cloudy  Slightly Cloudy   < >   --    < >  Clear   URINEGLC  Negative  Negative   < >   --    < >  30*   URINEBILI  Small   This is an unconfirmed screening test result. A positive result may be false.  *  Negative   < >   --    < >  Negative   URINEKETONE  Negative  Negative   < >   --    < >   "Negative   SG  1.012  1.010   < >   --    < >  1.016   UBLD  Negative  Negative   < >   --    < >  Small*   URINEPH  5.0  5.0   < >   --    < >  5.0   PROTEIN  30*  30*   < >   --    < >  30*   NITRITE  Negative  Negative   < >   --    < >  Negative   LEUKEST  Negative  Negative   < >   --    < >  Negative   RBCU  0  3*   < >   --    < >  1   WBCU  10*  7*   < >   --    < >  1   UTPG   --    --    --   1.55*   --   0.41*    < > = values in this interval not displayed.          7/21/2017   SPECIMEN(S):   A: Colon biopsy, ascending   B: Colon biopsy, descending     FINAL DIAGNOSIS:   A. COLON BIOPSY, ASCENDING:   - Colonic mucosa with no significant histologic abnormality   - Negative for intraepithelial lymphocytosis or deposition of   subepithelial thick collagenous band     B. COLON BIOPSY, DESCENDING:   - Crypt architecture distortion, consistent with healed prior injury   - Negative for active inflammation or dysplasia   - Negative for intraepithelial lymphocytosis or deposition of   subepithelial thick collagenous band   - Please, see comment     COMMENT:   Specimen B, \"descending colon\" features lamina propria edema, slight   variation in crypt sizes and shapes and occasional crypt drop-out.  This   may indicate a resolving injury which could be drug-induced or   infectious.       "

## 2017-08-18 NOTE — PLAN OF CARE
Problem: Goal Outcome Summary  Goal: Goal Outcome Summary  PT-7A: Fair tolerance for therapy this day, pt not demonstrating any improvements from previous session and limited carryover.  Needing Mod A x 2 with elevated HOB to complete lying->sitting transfer, sit->stand + CGA.  Amb ~100' with FWW + CGA and WC follow.      Recommend TCU at discharge.

## 2017-08-19 ENCOUNTER — APPOINTMENT (OUTPATIENT)
Dept: GENERAL RADIOLOGY | Facility: CLINIC | Age: 52
End: 2017-08-19
Attending: TRANSPLANT SURGERY
Payer: COMMERCIAL

## 2017-08-19 ENCOUNTER — APPOINTMENT (OUTPATIENT)
Dept: OCCUPATIONAL THERAPY | Facility: CLINIC | Age: 52
End: 2017-08-19
Attending: TRANSPLANT SURGERY
Payer: COMMERCIAL

## 2017-08-19 LAB
ANION GAP SERPL CALCULATED.3IONS-SCNC: 6 MMOL/L (ref 3–14)
BASOPHILS # BLD AUTO: 0 10E9/L (ref 0–0.2)
BASOPHILS NFR BLD AUTO: 0.5 %
BUN SERPL-MCNC: 50 MG/DL (ref 7–30)
CALCIUM SERPL-MCNC: 8.2 MG/DL (ref 8.5–10.1)
CHLORIDE SERPL-SCNC: 115 MMOL/L (ref 94–109)
CO2 SERPL-SCNC: 20 MMOL/L (ref 20–32)
CREAT SERPL-MCNC: 0.97 MG/DL (ref 0.66–1.25)
DIFFERENTIAL METHOD BLD: ABNORMAL
EOSINOPHIL # BLD AUTO: 0 10E9/L (ref 0–0.7)
EOSINOPHIL NFR BLD AUTO: 0 %
ERYTHROCYTE [DISTWIDTH] IN BLOOD BY AUTOMATED COUNT: 17.3 % (ref 10–15)
GFR SERPL CREATININE-BSD FRML MDRD: 81 ML/MIN/1.7M2
GLUCOSE BLDC GLUCOMTR-MCNC: 148 MG/DL (ref 70–99)
GLUCOSE BLDC GLUCOMTR-MCNC: 170 MG/DL (ref 70–99)
GLUCOSE BLDC GLUCOMTR-MCNC: 185 MG/DL (ref 70–99)
GLUCOSE BLDC GLUCOMTR-MCNC: 225 MG/DL (ref 70–99)
GLUCOSE BLDC GLUCOMTR-MCNC: 251 MG/DL (ref 70–99)
GLUCOSE SERPL-MCNC: 165 MG/DL (ref 70–99)
HCT VFR BLD AUTO: 27.2 % (ref 40–53)
HGB BLD-MCNC: 8.7 G/DL (ref 13.3–17.7)
IMM GRANULOCYTES # BLD: 0.1 10E9/L (ref 0–0.4)
IMM GRANULOCYTES NFR BLD: 3.3 %
LACTATE BLD-SCNC: 0.7 MMOL/L (ref 0.7–2.1)
LYMPHOCYTES # BLD AUTO: 0.6 10E9/L (ref 0.8–5.3)
LYMPHOCYTES NFR BLD AUTO: 29.8 %
MAGNESIUM SERPL-MCNC: 1.7 MG/DL (ref 1.6–2.3)
MCH RBC QN AUTO: 29.2 PG (ref 26.5–33)
MCHC RBC AUTO-ENTMCNC: 32 G/DL (ref 31.5–36.5)
MCV RBC AUTO: 91 FL (ref 78–100)
MONOCYTES # BLD AUTO: 0.3 10E9/L (ref 0–1.3)
MONOCYTES NFR BLD AUTO: 14.9 %
NEUTROPHILS # BLD AUTO: 1.1 10E9/L (ref 1.6–8.3)
NEUTROPHILS NFR BLD AUTO: 51.5 %
PHOSPHATE SERPL-MCNC: 3.5 MG/DL (ref 2.5–4.5)
PLATELET # BLD AUTO: 113 10E9/L (ref 150–450)
POTASSIUM SERPL-SCNC: 4.9 MMOL/L (ref 3.4–5.3)
RBC # BLD AUTO: 2.98 10E12/L (ref 4.4–5.9)
SODIUM SERPL-SCNC: 142 MMOL/L (ref 133–144)
TACROLIMUS BLD-MCNC: 4.5 UG/L (ref 5–15)
TME LAST DOSE: ABNORMAL H
WBC # BLD AUTO: 2.1 10E9/L (ref 4–11)

## 2017-08-19 PROCEDURE — 25000132 ZZH RX MED GY IP 250 OP 250 PS 637: Performed by: TRANSPLANT SURGERY

## 2017-08-19 PROCEDURE — 25000132 ZZH RX MED GY IP 250 OP 250 PS 637: Performed by: PHYSICIAN ASSISTANT

## 2017-08-19 PROCEDURE — 25000128 H RX IP 250 OP 636: Performed by: PHYSICIAN ASSISTANT

## 2017-08-19 PROCEDURE — 25000132 ZZH RX MED GY IP 250 OP 250 PS 637: Performed by: NURSE PRACTITIONER

## 2017-08-19 PROCEDURE — 80197 ASSAY OF TACROLIMUS: CPT | Performed by: STUDENT IN AN ORGANIZED HEALTH CARE EDUCATION/TRAINING PROGRAM

## 2017-08-19 PROCEDURE — 00000146 ZZHCL STATISTIC GLUCOSE BY METER IP

## 2017-08-19 PROCEDURE — 36592 COLLECT BLOOD FROM PICC: CPT | Performed by: STUDENT IN AN ORGANIZED HEALTH CARE EDUCATION/TRAINING PROGRAM

## 2017-08-19 PROCEDURE — 25000128 H RX IP 250 OP 636: Performed by: INTERNAL MEDICINE

## 2017-08-19 PROCEDURE — 27210432 ZZH NUTRITION PRODUCT RENAL BASIC LITER

## 2017-08-19 PROCEDURE — 27210913 ZZH NUTRITION PRODUCT INTERMEDIATE PACKET

## 2017-08-19 PROCEDURE — 97535 SELF CARE MNGMENT TRAINING: CPT | Mod: GO

## 2017-08-19 PROCEDURE — 73140 X-RAY EXAM OF FINGER(S): CPT | Mod: RT

## 2017-08-19 PROCEDURE — 80048 BASIC METABOLIC PNL TOTAL CA: CPT | Performed by: STUDENT IN AN ORGANIZED HEALTH CARE EDUCATION/TRAINING PROGRAM

## 2017-08-19 PROCEDURE — 12000024 ZZH R&B TRANSPLANT CRITICAL

## 2017-08-19 PROCEDURE — 25000131 ZZH RX MED GY IP 250 OP 636 PS 637: Performed by: INTERNAL MEDICINE

## 2017-08-19 PROCEDURE — 40000133 ZZH STATISTIC OT WARD VISIT

## 2017-08-19 PROCEDURE — 97530 THERAPEUTIC ACTIVITIES: CPT | Mod: GO

## 2017-08-19 PROCEDURE — 25000128 H RX IP 250 OP 636: Performed by: TRANSPLANT SURGERY

## 2017-08-19 PROCEDURE — 84100 ASSAY OF PHOSPHORUS: CPT | Performed by: STUDENT IN AN ORGANIZED HEALTH CARE EDUCATION/TRAINING PROGRAM

## 2017-08-19 PROCEDURE — 85027 COMPLETE CBC AUTOMATED: CPT | Performed by: STUDENT IN AN ORGANIZED HEALTH CARE EDUCATION/TRAINING PROGRAM

## 2017-08-19 PROCEDURE — 25000131 ZZH RX MED GY IP 250 OP 636 PS 637: Performed by: PHYSICIAN ASSISTANT

## 2017-08-19 PROCEDURE — 85004 AUTOMATED DIFF WBC COUNT: CPT | Performed by: STUDENT IN AN ORGANIZED HEALTH CARE EDUCATION/TRAINING PROGRAM

## 2017-08-19 PROCEDURE — 25000131 ZZH RX MED GY IP 250 OP 636 PS 637: Performed by: TRANSPLANT SURGERY

## 2017-08-19 PROCEDURE — 25000132 ZZH RX MED GY IP 250 OP 250 PS 637: Performed by: STUDENT IN AN ORGANIZED HEALTH CARE EDUCATION/TRAINING PROGRAM

## 2017-08-19 PROCEDURE — 25000132 ZZH RX MED GY IP 250 OP 250 PS 637: Performed by: COLON & RECTAL SURGERY

## 2017-08-19 PROCEDURE — 83605 ASSAY OF LACTIC ACID: CPT | Performed by: TRANSPLANT SURGERY

## 2017-08-19 PROCEDURE — 25000128 H RX IP 250 OP 636: Performed by: NURSE PRACTITIONER

## 2017-08-19 PROCEDURE — 36415 COLL VENOUS BLD VENIPUNCTURE: CPT | Performed by: STUDENT IN AN ORGANIZED HEALTH CARE EDUCATION/TRAINING PROGRAM

## 2017-08-19 PROCEDURE — 83735 ASSAY OF MAGNESIUM: CPT | Performed by: STUDENT IN AN ORGANIZED HEALTH CARE EDUCATION/TRAINING PROGRAM

## 2017-08-19 PROCEDURE — 25000132 ZZH RX MED GY IP 250 OP 250 PS 637: Performed by: SURGERY

## 2017-08-19 RX ORDER — LINEZOLID 2 MG/ML
600 INJECTION, SOLUTION INTRAVENOUS EVERY 12 HOURS
Status: DISCONTINUED | OUTPATIENT
Start: 2017-08-19 | End: 2017-08-22

## 2017-08-19 RX ADMIN — PIPERACILLIN AND TAZOBACTAM 3.38 G: 3; .375 INJECTION, POWDER, LYOPHILIZED, FOR SOLUTION INTRAVENOUS; PARENTERAL at 13:56

## 2017-08-19 RX ADMIN — Medication 250 MG: at 07:41

## 2017-08-19 RX ADMIN — Medication 5 ML: at 12:08

## 2017-08-19 RX ADMIN — Medication 15 ML: at 12:08

## 2017-08-19 RX ADMIN — Medication 1 PACKET: at 07:45

## 2017-08-19 RX ADMIN — LOPERAMIDE HYDROCHLORIDE 4 MG: 2 SOLUTION ORAL at 20:24

## 2017-08-19 RX ADMIN — PIPERACILLIN AND TAZOBACTAM 3.38 G: 3; .375 INJECTION, POWDER, LYOPHILIZED, FOR SOLUTION INTRAVENOUS; PARENTERAL at 20:24

## 2017-08-19 RX ADMIN — Medication 1 PACKET: at 20:24

## 2017-08-19 RX ADMIN — ACETAMINOPHEN 650 MG: 325 TABLET ORAL at 01:40

## 2017-08-19 RX ADMIN — Medication 1 PACKET: at 13:57

## 2017-08-19 RX ADMIN — MICONAZOLE NITRATE: 20 CREAM TOPICAL at 07:42

## 2017-08-19 RX ADMIN — Medication 1 PACKET: at 07:41

## 2017-08-19 RX ADMIN — SODIUM BICARBONATE 650 MG TABLET 1300 MG: at 13:56

## 2017-08-19 RX ADMIN — LOPERAMIDE HYDROCHLORIDE 4 MG: 2 SOLUTION ORAL at 12:08

## 2017-08-19 RX ADMIN — HEPARIN SODIUM 5000 UNITS: 5000 INJECTION, SOLUTION INTRAVENOUS; SUBCUTANEOUS at 12:08

## 2017-08-19 RX ADMIN — TACROLIMUS 4 MG: 1 CAPSULE ORAL at 07:41

## 2017-08-19 RX ADMIN — LOPERAMIDE HYDROCHLORIDE 4 MG: 2 SOLUTION ORAL at 09:20

## 2017-08-19 RX ADMIN — MICONAZOLE NITRATE: 20 CREAM TOPICAL at 20:25

## 2017-08-19 RX ADMIN — ASCORBIC ACID TAB 250 MG 250 MG: 250 TAB at 07:41

## 2017-08-19 RX ADMIN — FLUDROCORTISONE ACETATE 0.1 MG: 0.1 TABLET ORAL at 07:41

## 2017-08-19 RX ADMIN — PIPERACILLIN AND TAZOBACTAM 3.38 G: 3; .375 INJECTION, POWDER, LYOPHILIZED, FOR SOLUTION INTRAVENOUS; PARENTERAL at 07:40

## 2017-08-19 RX ADMIN — INSULIN ASPART 8 UNITS: 100 INJECTION, SOLUTION INTRAVENOUS; SUBCUTANEOUS at 09:20

## 2017-08-19 RX ADMIN — LINEZOLID 600 MG: 600 INJECTION, SOLUTION INTRAVENOUS at 14:56

## 2017-08-19 RX ADMIN — SODIUM BICARBONATE 650 MG TABLET 1300 MG: at 07:41

## 2017-08-19 RX ADMIN — LOPERAMIDE HYDROCHLORIDE 4 MG: 2 SOLUTION ORAL at 16:16

## 2017-08-19 RX ADMIN — PANTOPRAZOLE SODIUM 40 MG: 40 TABLET, DELAYED RELEASE ORAL at 07:41

## 2017-08-19 RX ADMIN — Medication 80 MG: at 07:41

## 2017-08-19 RX ADMIN — Medication 5 ML: at 16:16

## 2017-08-19 RX ADMIN — INSULIN GLARGINE 45 UNITS: 100 INJECTION, SOLUTION SUBCUTANEOUS at 12:09

## 2017-08-19 RX ADMIN — PIPERACILLIN AND TAZOBACTAM 3.38 G: 3; .375 INJECTION, POWDER, LYOPHILIZED, FOR SOLUTION INTRAVENOUS; PARENTERAL at 03:57

## 2017-08-19 RX ADMIN — OXYCODONE HYDROCHLORIDE 5 MG: 5 SOLUTION ORAL at 21:45

## 2017-08-19 RX ADMIN — SODIUM BICARBONATE 650 MG TABLET 1300 MG: at 20:25

## 2017-08-19 RX ADMIN — GANCICLOVIR SODIUM 450 MG: 500 INJECTION, POWDER, LYOPHILIZED, FOR SOLUTION INTRAVENOUS at 16:16

## 2017-08-19 RX ADMIN — HEPARIN SODIUM 5000 UNITS: 5000 INJECTION, SOLUTION INTRAVENOUS; SUBCUTANEOUS at 20:24

## 2017-08-19 RX ADMIN — Medication 15 ML: at 07:42

## 2017-08-19 RX ADMIN — AMLODIPINE BESYLATE 5 MG: 10 TABLET ORAL at 07:41

## 2017-08-19 RX ADMIN — HEPARIN SODIUM 5000 UNITS: 5000 INJECTION, SOLUTION INTRAVENOUS; SUBCUTANEOUS at 03:54

## 2017-08-19 RX ADMIN — Medication 250 MG: at 20:25

## 2017-08-19 RX ADMIN — TACROLIMUS 4.5 MG: 1 CAPSULE ORAL at 18:05

## 2017-08-19 RX ADMIN — Medication 5 ML: at 07:45

## 2017-08-19 RX ADMIN — Medication 5 ML: at 20:24

## 2017-08-19 RX ADMIN — ONDANSETRON 4 MG: 2 INJECTION INTRAMUSCULAR; INTRAVENOUS at 09:15

## 2017-08-19 RX ADMIN — Medication 15 ML: at 16:18

## 2017-08-19 NOTE — PLAN OF CARE
Problem: Goal Outcome Summary  Goal: Goal Outcome Summary  Outcome: No Change  /85 (BP Location: Left arm)  Pulse 104  Temp 98  F (36.7  C) (Oral)  Resp 26  Ht 1.829 m (6')  Wt 96.9 kg (213 lb 9.6 oz)  SpO2 95%  BMI 28.97 kg/m2     Patient VSS on RA; afebrile. , 218 and 170. Received tylenol for pain x1 with relief. Denies nausea on regular/thin liquid diet. Able to swallow pills with water. TF infusing at 65 mL/hr through NJ. PICC infusing TKO for antibiotics. Voiding adequately, at times can use urinal but incontinent x3. Ileostomy with moderate amount of liquid brown stool. Drain sites irrigated per order with serosanguineous/serous output. Incision CDI; stapled. Continue with POC and notify MD with changes or concerns.

## 2017-08-19 NOTE — PROGRESS NOTES
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  Now s/p ex lap with R hemicolectomy, end ileostomy, mucus fistula, partial omenectomy on 7/26.     He was transitioned off of IV insulin yesterday.     Recent Labs  Lab 08/19/17  0746 08/19/17  0630 08/19/17  0354 08/18/17  2325 08/18/17  1952 08/18/17  1610 08/18/17  1203  08/18/17  0538  08/17/17  0632  08/16/17  0650  08/15/17  0639  08/14/17  0621   GLC  --  165*  --   --   --   --   --   --  127*  --  128*  --  129*  --  112*  --  133*   *  --  170* 218* 254* 267* 201*  < >  --   < >  --   < >  --   < >  --   < >  --    < > = values in this interval not displayed.    Active Diet Order      Regular Diet Adult Thin Liquids (water, ice chips, juice, milk, gelatin, ice cream, etc)    Plan:  -Glargine 45 units q24h   -meal aspart 1unit/7g CHO ordered for meals and snacks  -correction aspart: high intensity [id number]  -monitor glucose q4h.     Will continue to follow  Please notify diabetes team of changes planned for nutrition support schedule.      Outpatient diabetes follow up: Dr. Eckert at [facility]    Patient was seen with Dr. Hernandez.     Lui Avila MD  Endocrinology Fellow  Pager [phone]    Active Diet Order      Regular Diet Adult Thin Liquids (water, ice chips, juice, milk, gelatin, ice cream, etc)      Exam:    BP (!) 172/98  Pulse 104  Temp 98.2  F (36.8  C)  Resp 24  Ht 1.829 m (6')  Wt 96.9 kg (213 lb 9.6 oz)  SpO2 96%  BMI 28.97 kg/m2    Recent Labs  Lab 08/19/17  0746 08/19/17  0630 08/19/17  0354 08/18/17  2325 08/18/17  1952 08/18/17  1610 08/18/17  1203  08/18/17  0538  08/17/17  0632  08/16/17  0650   08/15/17  0639  08/14/17  0621   GLC  --  165*  --   --   --   --   --   --  127*  --  128*  --  129*  --  112*  --  133*   *  --  170* 218* 254* 267* 201*  < >  --   < >  --   < >  --   < >  --   < >  --    < > = values in this interval not displayed.  Lab Results   Component Value Date    A1C  07/06/2017     Canceled, Test credited   Below Assay Range  NOTIFIED LEONARD ONEILL AT 0538 ON 7/6/17 BY HCRISSY      A1C 9.4 02/16/2017    A1C 6.2 12/14/2016    A1C 6.1 10/27/2016    A1C 8.3 07/02/2012       --- Addendum----  I saw the patient with endocrine fellow Dr. Avila and directly examined patient and discussed. Agree above note and plan.     Kaci Hernandez MD  Staff Physician  Endocrinology and Metabolism  [facility]  License: [id number]  Pager: [phone]

## 2017-08-19 NOTE — PROGRESS NOTES
Calorie Counts  Intake recorded for: 8/18  Kcals: 1204  Protein: 57g  # Meals Recorded: 100% 4 skim milks, orange juice, cantaloupe, 50% cream of wheat with brown sugar, diet lemonade, orange  # Supplements Recorded: 100% 1.5 Ensure Plus

## 2017-08-19 NOTE — PROGRESS NOTES
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 Admitted 7/4/17 from [facility] for evaluation and management of sepsis secondary to colitis, taken to OR for initial exploratory laparotomy with findings of typhlitis in the right colon, wound left open with wound vac in place for reexploration and interval closure on 7/5/2017. Concern for CMV colitis with low count CMV viremia/GI PTLD and subsequently underwent colonoscopy on 7/21, random biopsies obtained.  On 7/25/2017 patient became septic with abdominal compartment syndrome. CT noted new large heterogeneous right sided retroperitoneal gas and air tracking along duodenum and underwent a exploratory laparotomy, right angelique-colectomy, end ileostomy, mucus fistula, partial omentectomy on 7/26 by colorectal surgery.  Antibiotics discontinued 8/3. 8/6 became septic with increased abd pain, confusion. 8/8 right retroperitoneal drain placed. Additional peritoneal drain placed 8/15.    Graft Function: Good function. LFTs acceptable (checking every Monday, Thursday).   Immunosuppression Management:   CellCept discontinued (6/27/17) due to neutropenia.   Prograf goal ~5-6 due to sepsis.  Unable to obtain detectable level with suspension.  Level 8/15, 6.6 tacrolimus drip was continued at 110/hr. Convert to oral capsule 8/17, 4mg BID, level falsely elevated yesterday, follow level today  Cardiorespiratory: Stable respiratory status, NLB on RA. Assist with aggressive pulmonary toilet. OOB to chair and ambulate as tolerated.   HTN:-175, Continue amlodipine 5mg suspension. Pt on daily Florinef  Hematology: Pancytopenia present on admission, persists. Continue to hold MMF.   Primary CMV infection  Anemia- Hemoglobin 6.9. Transfusing 1 unit today.  Last blood transfusion on 8/16  Leukopenia-worsening. WBC 1.6, ANC 0.8. Neupogen given, no dose today  GI:    -Neutropenic colitis on admission. Colonoscopy 7/21. Biopsy: resolving injury secondary to possible drug induced vs infectious.   -Bowel perforation: 7/25 CT scan abd/pelvis-new large heterogeneous right sided retroperitoneal gas and air tracking along duodenum.  No contrast extravasation.  No intraperitoneal air noted. Colorectal surgery performed Ex lap, right angelique-colectomy, end ileostomy, mucus fistula, partial omentectomy on 7/26. Findings no neida perforation, phlegmon/inflammation right colon. Colon pathology suggested colon perforation, negative for malignancy; CMV stain positive.    -Retroperitoneal abscess/ascites peritonitis: CT A/P 8/9 showed a persistent gas/fluid collection RLQ, peritonitis, some free air but no evidence of contrast extravasation. 8/11: Diag. Para WBC 5,038. Repeat CT scan abd/pelvis 8/14 - Complex gas-containing fluid collection in the right retroperitoneum decreased slightly in size. Moderate ascites, with intraperitoneal gas, peritonitis noted.  IR placed additional drain to abdomen with cloudy/turbid fluid. No leak identified on CT scan.   -Diet:  Regular diet with thin liquids + Nepro NJ feeds @65/hr. Initiate calorie counts.  -High output  ileostomy:  Goal ileostomy output ~ 1200 cc in 24 hrs.  Output 1.3L yesterday.  Loperamide 4mg QID/ Lomotil QID/Fiber BID.  Fluid/Electrolytes/Renal:   -Dehydration: Initiate biotene/good oral hygiene for dry mouth    -EJ: on admission, d/t ATN sec to sepsis. SCR improved 1.13   -Hypernatremia: resolved with free water.    -Hyperkalemia: continue florinef daily  Endocrine: DM type 2. Endocrine consulting for glycemic management. Off insulin gtt on lantus 45 units with SSI and carb coverage, appreciate endo recs.  Infectious disease: Afebrile.  WBC1.6. ID following.    -Retroperitoneal abscess: CT C/A/P 8/9 showed a persistent gas/fluid collection RLQ, peritonitis, some free air but no evidence of contrast extravasation.  IR placed RP drain,  growing clostridium- on zosyn. Per ID, if stable over the next 48 hrs, transition to Levofloxacin 750mg qday + Metronidazole 500mg TID on Sunday 8/20.  -CMV viremia: Primary CMV infection.  (CMV R-D+) CMV colitis per pathology, peak serum 7/15 4225 IU/ml.   IV Ganciclovir (started 7/20). Follow CMV PCR weekly, now <137 x 3 weeks.  Last level 8/17. Decrease Ganciclovir to 5mg/kg, consider po valcyte if next week negative.  -EBV viremia:  Peak serum 406,697 copies/ml on 7/18.  Down to 6864 as of 8/1.  Check weekly, now improved to 753 (8/15).  -R/o SBP or abscess:  8/11 paracentesis.  WBC 5,038, cx negative.  Repeat 8/15-fluid cloudy/purulent with cc=15,680.  Resolved issues:  -Severe sepsis: On admission, secondary to right colon phlegmon. Meropenem/daptomycin/micafungin tx x 10 days completed on 8/3. S/p right angelique-colectomy.  Path: CMV stain +, perforation of colon noted. Drain cultures with clostridium septicum.  Neuro: Toxic metabolic encephalopathy secondary to infection, prolonged hospital stay. Improving,virtual sitter present.   Vascular:  Evidence of microvascular injury in digits (toes) this is most likely due to injury while patient was on pressor therapy with sepsis.  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   Activity: Deconditioned due to prolong hospital course. Daily PT/OT, encourage pt to be OOB to chair. Encourage pulmonary toilet.   Prophylaxis: DVT-restart Heparin SQ  Disposition: 7A.     Medical Decision Making: Medium    PILLO/Fellow/Resident Provider:   Perico Garcia   Surgery PGY3  [phone]      Faculty: Tip Zhang M.D.      __________________________________________________________________  Transplant History:.  3/4/2017 DD OLT with Dr. Chinnakotla (Liver), Postoperative day: 168     Interval History:   Overnight events: No acute events overnight. Tolerating diet. Reports some nausea, back pain.    ROS:   A 10-point review of systems was negative except as  noted above.    Meds:    insulin glargine  45 Units Subcutaneous Q24H     insulin aspart   Subcutaneous TID w/meals     artificial saliva  15 mL Swish & Spit 4x Daily     heparin  5,000 Units Subcutaneous Q8H     insulin aspart  1-12 Units Subcutaneous Q4H     - MEDICATION INSTRUCTIONS -   Does not apply Once     ganciclovir (CYTOVENE) intermittent infusion  5 mg/kg Intravenous Q24H     fludrocortisone  0.1 mg Oral Daily     tacrolimus  4 mg Oral BID IS     amLODIPine  5 mg Oral Daily     sodium chloride (PF)  10 mL Irrigation Q8H     fiber modular  1 packet Per Feeding Tube BID     sodium chloride (PF)  20 mL Irrigation Q6H     diphenoxylate-atropine  5 mL Oral 4x Daily     saccharomyces boulardii  250 mg Oral BID     sodium bicarbonate  1,300 mg Per NG tube TID     ascorbic acid  250 mg Oral Daily     miconazole with skin protectant   Topical BID     piperacillin-tazobactam  3.375 g Intravenous Q6H     loperamide  4 mg Oral or Feeding Tube 4x Daily     protein modular  1 packet Per Feeding Tube TID     aspirin  80 mg Oral Daily     pantoprazole  40 mg Oral or Feeding Tube Daily     sodium chloride (PF)  3 mL Intracatheter Q8H       Physical Exam:     Admit Weight: 94.2 kg (207 lb 11.2 oz) (SCALE 2)    Current vitals:   BP (!) 172/98  Pulse 104  Temp 98.2  F (36.8  C)  Resp 24  Ht 1.829 m (6')  Wt 96.9 kg (213 lb 9.6 oz)  SpO2 96%  BMI 28.97 kg/m2    Vital sign ranges:    Temp:  [97.4  F (36.3  C)-99.2  F (37.3  C)] 98.2  F (36.8  C)  Pulse:  [] 104  Heart Rate:  [] 90  Resp:  [20-30] 24  BP: (152-172)/(78-98) 172/98  SpO2:  [94 %-96 %] 96 %  Patient Vitals for the past 24 hrs:   BP Temp Temp src Pulse Heart Rate Resp SpO2   08/19/17 0700 (!) 172/98 98.2  F (36.8  C) - - 90 24 96 %   08/19/17 0325 155/85 98  F (36.7  C) Oral - 93 26 95 %   08/18/17 2329 (!) 152/91 97.4  F (36.3  C) Oral - 99 26 95 %   08/18/17 1959 169/78 99.2  F (37.3  C) Oral 104 - 28 94 %   08/18/17 1605 167/89 98.5  F (36.9   C) Oral - 103 30 94 %   08/18/17 1206 156/87 97.6  F (36.4  C) Oral 100 100 28 96 %   08/18/17 1116 (!) 170/94 97.9  F (36.6  C) Oral 101 99 20 96 %   08/18/17 1055 161/90 98.2  F (36.8  C) Oral 96 95 20 96 %     General Appearance: NAD  Skin: normal, warm, dry  Heart: RRR  HEENT: dry oral cavity/chapped lips, no oral ulcerations, no Thrush  Lungs: nonlabored respirations on RA  Abdomen: soft, rounded, more tender. Ileostomy with brown output. Mucus fistula covered. Midline incision, c/d/i.  Right abdominal Drains: 20F-s/s, 12F -bilious/purulent  : Crowe present.  Extremities: No edema.  Necrotic blackened areas on right 1st & 2nd toes and left 2nd toe.  Neurologic: A&O x 3, speech appropriate      Data:   CMP    Recent Labs  Lab 08/19/17  0630 08/18/17  0538 08/17/17  0632 08/16/17  1100 08/16/17  0650    143 142  --  142   POTASSIUM 4.9 4.5 4.0  --  4.0   CHLORIDE 115* 116* 116*  --  117*   CO2 20 21 18*  --  16*   * 127* 128*  --  129*   BUN 50* 56* 61*  --  65*   CR 0.97 1.13 1.23  --  1.50*   GFRESTIMATED 81 68 62  --  49*   GFRESTBLACK >90 82 75  --  59*   JACOB 8.2* 8.0* 8.2*  --  8.3*   MAG 1.7 1.7 1.9  --  1.8   PHOS 3.5 3.4 3.9  --  4.4   LIPASE  --   --   --   --  55*   ALBUMIN  --  1.7* 1.7*  --   --    BILITOTAL  --  0.5 0.6  --   --    ALKPHOS  --  236* 254*  --   --    AST  --  32 33  --   --    ALT  --  21 21  --   --    FAMY  --   --   --  10  --    FLIPA  --   --   --  34  --      CBC    Recent Labs  Lab 08/19/17  0904 08/18/17  0538   HGB 8.7* 6.9*   WBC 2.1* 1.6*   * 107*     COAGS  No lab results found in last 7 days.    Invalid input(s): XA   Urinalysis  Recent Labs   Lab Test  08/10/17   1752  08/08/17   1600   06/14/17   1508   04/11/16   1345   COLOR  Yellow  Yellow   < >   --    < >  Yellow   APPEARANCE  Slightly Cloudy  Slightly Cloudy   < >   --    < >  Clear   URINEGLC  Negative  Negative   < >   --    < >  30*   URINEBILI  Small   This is an unconfirmed screening  "test result. A positive result may be false.  *  Negative   < >   --    < >  Negative   URINEKETONE  Negative  Negative   < >   --    < >  Negative   SG  1.012  1.010   < >   --    < >  1.016   UBLD  Negative  Negative   < >   --    < >  Small*   URINEPH  5.0  5.0   < >   --    < >  5.0   PROTEIN  30*  30*   < >   --    < >  30*   NITRITE  Negative  Negative   < >   --    < >  Negative   LEUKEST  Negative  Negative   < >   --    < >  Negative   RBCU  0  3*   < >   --    < >  1   WBCU  10*  7*   < >   --    < >  1   UTPG   --    --    --   1.55*   --   0.41*    < > = values in this interval not displayed.          7/21/2017   SPECIMEN(S):   A: Colon biopsy, ascending   B: Colon biopsy, descending     FINAL DIAGNOSIS:   A. COLON BIOPSY, ASCENDING:   - Colonic mucosa with no significant histologic abnormality   - Negative for intraepithelial lymphocytosis or deposition of   subepithelial thick collagenous band     B. COLON BIOPSY, DESCENDING:   - Crypt architecture distortion, consistent with healed prior injury   - Negative for active inflammation or dysplasia   - Negative for intraepithelial lymphocytosis or deposition of   subepithelial thick collagenous band   - Please, see comment     COMMENT:   Specimen B, \"descending colon\" features lamina propria edema, slight   variation in crypt sizes and shapes and occasional crypt drop-out.  This   may indicate a resolving injury which could be drug-induced or   infectious.       "

## 2017-08-19 NOTE — PLAN OF CARE
Problem: Goal Outcome Summary  Goal: Goal Outcome Summary  OT 7A: Min A for supine > sitting EOB; CGA for sitting EOB > supine. Attempted to try sitting up in chair to facilitate OOB activity with different positions/support however patient unable to tolerate 2/2 back pain. Pt tolerating ~90 feet functional mobility CGA with FWW and standing for ~2 minutes x 2 SBA. Administered MoCA 7.2 this date with patient scoring 10/30, indicating significant cognitive impairments. (Pt scored 17/30 on MoCA 7.1 in April 2017). Pt aware of poor performance but stating that he feels closer to normal today with cognition. Pt limiting cognition, pain, strength and endurance. Rec: TCU to continue progression of independence.

## 2017-08-19 NOTE — PLAN OF CARE
Problem: Goal Outcome Summary  Goal: Goal Outcome Summary  Outcome: No Change  [id number]: Patient continues to be hypertensive, not requiring hydralazine. Other VSS and afebrile. He received oxycodone x1 for pain. He received zofran x1 for pain. He tolerated a regular diet with a fair diet. He continues on calorie counts. He is voiding spontaneously and adequate amounts. Ileoileostomy is patent and draining. Both abdominal drains are patent and draining. They were flushed twice today. Blood sugars since were 148, 251, 185. Patient has been up in the recliner multiple times this shift. He is up with assist of 2. Tube feeds continue at 65cc/hr via his NJ tube. Will continue to monitor and update the team as needed.

## 2017-08-20 LAB
ALBUMIN SERPL-MCNC: 1.6 G/DL (ref 3.4–5)
ANION GAP SERPL CALCULATED.3IONS-SCNC: 6 MMOL/L (ref 3–14)
ANION GAP SERPL CALCULATED.3IONS-SCNC: 6 MMOL/L (ref 3–14)
BACTERIA SPEC CULT: ABNORMAL
BACTERIA SPEC CULT: ABNORMAL
BACTERIA SPEC CULT: NO GROWTH
BASOPHILS # BLD AUTO: 0 10E9/L (ref 0–0.2)
BASOPHILS NFR BLD AUTO: 0.7 %
BUN SERPL-MCNC: 45 MG/DL (ref 7–30)
BUN SERPL-MCNC: 48 MG/DL (ref 7–30)
CALCIUM SERPL-MCNC: 8.1 MG/DL (ref 8.5–10.1)
CALCIUM SERPL-MCNC: 8.5 MG/DL (ref 8.5–10.1)
CHLORIDE SERPL-SCNC: 114 MMOL/L (ref 94–109)
CHLORIDE SERPL-SCNC: 115 MMOL/L (ref 94–109)
CO2 SERPL-SCNC: 21 MMOL/L (ref 20–32)
CO2 SERPL-SCNC: 22 MMOL/L (ref 20–32)
CREAT SERPL-MCNC: 0.93 MG/DL (ref 0.66–1.25)
CREAT SERPL-MCNC: 0.98 MG/DL (ref 0.66–1.25)
DIFFERENTIAL METHOD BLD: ABNORMAL
EOSINOPHIL # BLD AUTO: 0 10E9/L (ref 0–0.7)
EOSINOPHIL NFR BLD AUTO: 0 %
ERYTHROCYTE [DISTWIDTH] IN BLOOD BY AUTOMATED COUNT: 17.6 % (ref 10–15)
GFR SERPL CREATININE-BSD FRML MDRD: 80 ML/MIN/1.7M2
GFR SERPL CREATININE-BSD FRML MDRD: 85 ML/MIN/1.7M2
GLUCOSE BLDC GLUCOMTR-MCNC: 119 MG/DL (ref 70–99)
GLUCOSE BLDC GLUCOMTR-MCNC: 126 MG/DL (ref 70–99)
GLUCOSE BLDC GLUCOMTR-MCNC: 155 MG/DL (ref 70–99)
GLUCOSE BLDC GLUCOMTR-MCNC: 162 MG/DL (ref 70–99)
GLUCOSE BLDC GLUCOMTR-MCNC: 164 MG/DL (ref 70–99)
GLUCOSE BLDC GLUCOMTR-MCNC: 210 MG/DL (ref 70–99)
GLUCOSE SERPL-MCNC: 115 MG/DL (ref 70–99)
GLUCOSE SERPL-MCNC: 218 MG/DL (ref 70–99)
HCT VFR BLD AUTO: 25.9 % (ref 40–53)
HGB BLD-MCNC: 8.1 G/DL (ref 13.3–17.7)
IMM GRANULOCYTES # BLD: 0 10E9/L (ref 0–0.4)
IMM GRANULOCYTES NFR BLD: 0 %
LACTATE BLD-SCNC: 0.7 MMOL/L (ref 0.7–2.1)
LYMPHOCYTES # BLD AUTO: 0.5 10E9/L (ref 0.8–5.3)
LYMPHOCYTES NFR BLD AUTO: 36.6 %
Lab: ABNORMAL
Lab: NORMAL
MAGNESIUM SERPL-MCNC: 1.7 MG/DL (ref 1.6–2.3)
MCH RBC QN AUTO: 28.8 PG (ref 26.5–33)
MCHC RBC AUTO-ENTMCNC: 31.3 G/DL (ref 31.5–36.5)
MCV RBC AUTO: 92 FL (ref 78–100)
MONOCYTES # BLD AUTO: 0.2 10E9/L (ref 0–1.3)
MONOCYTES NFR BLD AUTO: 15.9 %
NEUTROPHILS # BLD AUTO: 0.7 10E9/L (ref 1.6–8.3)
NEUTROPHILS NFR BLD AUTO: 46.8 %
NRBC # BLD AUTO: 0 10*3/UL
NRBC BLD AUTO-RTO: 0 /100
PHOSPHATE SERPL-MCNC: 3.7 MG/DL (ref 2.5–4.5)
PHOSPHATE SERPL-MCNC: 3.9 MG/DL (ref 2.5–4.5)
PLATELET # BLD AUTO: 111 10E9/L (ref 150–450)
POTASSIUM SERPL-SCNC: 5.1 MMOL/L (ref 3.4–5.3)
POTASSIUM SERPL-SCNC: 5.4 MMOL/L (ref 3.4–5.3)
POTASSIUM SERPL-SCNC: 5.5 MMOL/L (ref 3.4–5.3)
RBC # BLD AUTO: 2.81 10E12/L (ref 4.4–5.9)
SODIUM SERPL-SCNC: 141 MMOL/L (ref 133–144)
SODIUM SERPL-SCNC: 142 MMOL/L (ref 133–144)
SPECIMEN SOURCE: ABNORMAL
SPECIMEN SOURCE: NORMAL
TACROLIMUS BLD-MCNC: 4.6 UG/L (ref 5–15)
TME LAST DOSE: ABNORMAL H
WBC # BLD AUTO: 1.5 10E9/L (ref 4–11)

## 2017-08-20 PROCEDURE — 25000132 ZZH RX MED GY IP 250 OP 250 PS 637: Performed by: SURGERY

## 2017-08-20 PROCEDURE — 85025 COMPLETE CBC W/AUTO DIFF WBC: CPT | Performed by: STUDENT IN AN ORGANIZED HEALTH CARE EDUCATION/TRAINING PROGRAM

## 2017-08-20 PROCEDURE — 25000128 H RX IP 250 OP 636: Performed by: TRANSPLANT SURGERY

## 2017-08-20 PROCEDURE — 25000128 H RX IP 250 OP 636: Performed by: NURSE PRACTITIONER

## 2017-08-20 PROCEDURE — 36415 COLL VENOUS BLD VENIPUNCTURE: CPT | Performed by: STUDENT IN AN ORGANIZED HEALTH CARE EDUCATION/TRAINING PROGRAM

## 2017-08-20 PROCEDURE — 84132 ASSAY OF SERUM POTASSIUM: CPT | Performed by: STUDENT IN AN ORGANIZED HEALTH CARE EDUCATION/TRAINING PROGRAM

## 2017-08-20 PROCEDURE — 25000125 ZZHC RX 250: Performed by: STUDENT IN AN ORGANIZED HEALTH CARE EDUCATION/TRAINING PROGRAM

## 2017-08-20 PROCEDURE — 25000128 H RX IP 250 OP 636: Performed by: INTERNAL MEDICINE

## 2017-08-20 PROCEDURE — 12000025 ZZH R&B TRANSPLANT INTERMEDIATE

## 2017-08-20 PROCEDURE — 00000146 ZZHCL STATISTIC GLUCOSE BY METER IP

## 2017-08-20 PROCEDURE — 80197 ASSAY OF TACROLIMUS: CPT | Performed by: STUDENT IN AN ORGANIZED HEALTH CARE EDUCATION/TRAINING PROGRAM

## 2017-08-20 PROCEDURE — 84100 ASSAY OF PHOSPHORUS: CPT | Performed by: STUDENT IN AN ORGANIZED HEALTH CARE EDUCATION/TRAINING PROGRAM

## 2017-08-20 PROCEDURE — 80069 RENAL FUNCTION PANEL: CPT | Performed by: TRANSPLANT SURGERY

## 2017-08-20 PROCEDURE — 25000132 ZZH RX MED GY IP 250 OP 250 PS 637: Performed by: STUDENT IN AN ORGANIZED HEALTH CARE EDUCATION/TRAINING PROGRAM

## 2017-08-20 PROCEDURE — 25000132 ZZH RX MED GY IP 250 OP 250 PS 637: Performed by: PHYSICIAN ASSISTANT

## 2017-08-20 PROCEDURE — 83735 ASSAY OF MAGNESIUM: CPT | Performed by: STUDENT IN AN ORGANIZED HEALTH CARE EDUCATION/TRAINING PROGRAM

## 2017-08-20 PROCEDURE — 25000128 H RX IP 250 OP 636

## 2017-08-20 PROCEDURE — 25000132 ZZH RX MED GY IP 250 OP 250 PS 637: Performed by: TRANSPLANT SURGERY

## 2017-08-20 PROCEDURE — 36592 COLLECT BLOOD FROM PICC: CPT | Performed by: STUDENT IN AN ORGANIZED HEALTH CARE EDUCATION/TRAINING PROGRAM

## 2017-08-20 PROCEDURE — 83605 ASSAY OF LACTIC ACID: CPT | Performed by: TRANSPLANT SURGERY

## 2017-08-20 PROCEDURE — 80048 BASIC METABOLIC PNL TOTAL CA: CPT | Performed by: STUDENT IN AN ORGANIZED HEALTH CARE EDUCATION/TRAINING PROGRAM

## 2017-08-20 PROCEDURE — 25000132 ZZH RX MED GY IP 250 OP 250 PS 637: Performed by: NURSE PRACTITIONER

## 2017-08-20 PROCEDURE — 25000128 H RX IP 250 OP 636: Performed by: PHYSICIAN ASSISTANT

## 2017-08-20 PROCEDURE — 25000132 ZZH RX MED GY IP 250 OP 250 PS 637: Performed by: COLON & RECTAL SURGERY

## 2017-08-20 PROCEDURE — 25000131 ZZH RX MED GY IP 250 OP 636 PS 637: Performed by: TRANSPLANT SURGERY

## 2017-08-20 RX ORDER — FUROSEMIDE 10 MG/ML
20 INJECTION INTRAMUSCULAR; INTRAVENOUS ONCE
Status: COMPLETED | OUTPATIENT
Start: 2017-08-20 | End: 2017-08-20

## 2017-08-20 RX ORDER — FUROSEMIDE 10 MG/ML
20 INJECTION INTRAMUSCULAR; INTRAVENOUS
Status: DISCONTINUED | OUTPATIENT
Start: 2017-08-20 | End: 2017-08-23

## 2017-08-20 RX ORDER — TACROLIMUS 5 MG/1
5 CAPSULE ORAL
Status: DISCONTINUED | OUTPATIENT
Start: 2017-08-20 | End: 2017-08-22

## 2017-08-20 RX ADMIN — GANCICLOVIR SODIUM 450 MG: 500 INJECTION, POWDER, LYOPHILIZED, FOR SOLUTION INTRAVENOUS at 17:01

## 2017-08-20 RX ADMIN — PIPERACILLIN AND TAZOBACTAM 3.38 G: 3; .375 INJECTION, POWDER, LYOPHILIZED, FOR SOLUTION INTRAVENOUS; PARENTERAL at 21:16

## 2017-08-20 RX ADMIN — Medication 250 MG: at 20:25

## 2017-08-20 RX ADMIN — SODIUM BICARBONATE 650 MG TABLET 1300 MG: at 08:33

## 2017-08-20 RX ADMIN — Medication 5 ML: at 12:26

## 2017-08-20 RX ADMIN — Medication 1 PACKET: at 08:35

## 2017-08-20 RX ADMIN — SODIUM BICARBONATE 650 MG TABLET 1300 MG: at 20:25

## 2017-08-20 RX ADMIN — HEPARIN SODIUM 5000 UNITS: 5000 INJECTION, SOLUTION INTRAVENOUS; SUBCUTANEOUS at 12:26

## 2017-08-20 RX ADMIN — FILGRASTIM 300 MCG: 300 INJECTION, SOLUTION INTRAVENOUS; SUBCUTANEOUS at 10:09

## 2017-08-20 RX ADMIN — PIPERACILLIN AND TAZOBACTAM 3.38 G: 3; .375 INJECTION, POWDER, LYOPHILIZED, FOR SOLUTION INTRAVENOUS; PARENTERAL at 08:35

## 2017-08-20 RX ADMIN — Medication 5 ML: at 20:25

## 2017-08-20 RX ADMIN — PIPERACILLIN AND TAZOBACTAM 3.38 G: 3; .375 INJECTION, POWDER, LYOPHILIZED, FOR SOLUTION INTRAVENOUS; PARENTERAL at 03:34

## 2017-08-20 RX ADMIN — LOPERAMIDE HYDROCHLORIDE 4 MG: 2 SOLUTION ORAL at 08:33

## 2017-08-20 RX ADMIN — LOPERAMIDE HYDROCHLORIDE 4 MG: 2 SOLUTION ORAL at 20:25

## 2017-08-20 RX ADMIN — Medication 1 PACKET: at 20:32

## 2017-08-20 RX ADMIN — ONDANSETRON 4 MG: 2 INJECTION INTRAMUSCULAR; INTRAVENOUS at 09:12

## 2017-08-20 RX ADMIN — Medication 2 G: at 10:38

## 2017-08-20 RX ADMIN — FLUDROCORTISONE ACETATE 0.1 MG: 0.1 TABLET ORAL at 08:33

## 2017-08-20 RX ADMIN — Medication 1 PACKET: at 08:34

## 2017-08-20 RX ADMIN — ACETAMINOPHEN 650 MG: 325 SOLUTION ORAL at 21:16

## 2017-08-20 RX ADMIN — FUROSEMIDE 20 MG: 10 INJECTION, SOLUTION INTRAVENOUS at 18:57

## 2017-08-20 RX ADMIN — HEPARIN SODIUM 5000 UNITS: 5000 INJECTION, SOLUTION INTRAVENOUS; SUBCUTANEOUS at 03:34

## 2017-08-20 RX ADMIN — Medication 15 ML: at 08:34

## 2017-08-20 RX ADMIN — MICONAZOLE NITRATE: 20 CREAM TOPICAL at 20:26

## 2017-08-20 RX ADMIN — Medication 15 ML: at 15:40

## 2017-08-20 RX ADMIN — LOPERAMIDE HYDROCHLORIDE 4 MG: 2 SOLUTION ORAL at 12:26

## 2017-08-20 RX ADMIN — MICONAZOLE NITRATE: 20 CREAM TOPICAL at 08:34

## 2017-08-20 RX ADMIN — LOPERAMIDE HYDROCHLORIDE 4 MG: 2 SOLUTION ORAL at 15:40

## 2017-08-20 RX ADMIN — LINEZOLID 600 MG: 600 INJECTION, SOLUTION INTRAVENOUS at 01:48

## 2017-08-20 RX ADMIN — TACROLIMUS 4.5 MG: 1 CAPSULE ORAL at 08:33

## 2017-08-20 RX ADMIN — LINEZOLID 600 MG: 600 INJECTION, SOLUTION INTRAVENOUS at 13:52

## 2017-08-20 RX ADMIN — PIPERACILLIN AND TAZOBACTAM 3.38 G: 3; .375 INJECTION, POWDER, LYOPHILIZED, FOR SOLUTION INTRAVENOUS; PARENTERAL at 13:46

## 2017-08-20 RX ADMIN — SODIUM BICARBONATE 650 MG TABLET 1300 MG: at 13:13

## 2017-08-20 RX ADMIN — PANTOPRAZOLE SODIUM 40 MG: 40 TABLET, DELAYED RELEASE ORAL at 08:33

## 2017-08-20 RX ADMIN — TACROLIMUS 5 MG: 5 CAPSULE ORAL at 18:16

## 2017-08-20 RX ADMIN — AMLODIPINE BESYLATE 5 MG: 10 TABLET ORAL at 08:33

## 2017-08-20 RX ADMIN — OXYCODONE HYDROCHLORIDE 5 MG: 5 SOLUTION ORAL at 13:13

## 2017-08-20 RX ADMIN — Medication 5 ML: at 08:33

## 2017-08-20 RX ADMIN — Medication 80 MG: at 08:33

## 2017-08-20 RX ADMIN — INSULIN GLARGINE 45 UNITS: 100 INJECTION, SOLUTION SUBCUTANEOUS at 08:34

## 2017-08-20 RX ADMIN — Medication 15 ML: at 21:17

## 2017-08-20 RX ADMIN — Medication 250 MG: at 08:33

## 2017-08-20 RX ADMIN — Medication 5 ML: at 15:40

## 2017-08-20 RX ADMIN — Medication 15 ML: at 12:27

## 2017-08-20 RX ADMIN — FUROSEMIDE 20 MG: 10 INJECTION, SOLUTION INTRAVENOUS at 15:40

## 2017-08-20 RX ADMIN — ASCORBIC ACID TAB 250 MG 250 MG: 250 TAB at 08:33

## 2017-08-20 RX ADMIN — HEPARIN SODIUM 5000 UNITS: 5000 INJECTION, SOLUTION INTRAVENOUS; SUBCUTANEOUS at 20:25

## 2017-08-20 RX ADMIN — Medication 1 PACKET: at 13:14

## 2017-08-20 RX ADMIN — Medication 1 PACKET: at 21:16

## 2017-08-20 RX ADMIN — FUROSEMIDE 20 MG: 10 INJECTION, SOLUTION INTRAVENOUS at 09:13

## 2017-08-20 ASSESSMENT — PAIN DESCRIPTION - DESCRIPTORS: DESCRIPTORS: SORE

## 2017-08-20 NOTE — PLAN OF CARE
"Problem: Goal Outcome Summary  Goal: Goal Outcome Summary  Outcome: Improving  /73 (BP Location: Right arm)  Pulse 99  Temp 97.4  F (36.3  C) (Oral)  Resp 28  Ht 1.829 m (6')  Wt 101.2 kg (223 lb)  SpO2 98%  BMI 30.24 kg/m2     Patient VSS on 2 LPM O2; afebrile.  and 126. Denies pain but endorsed nausea, refused intervention, stating \"I don't want any more drugs\". Regular diet/thin liquids- assist with feeds. Double lumen PICC infusing NS at TKO with antibiotics. Incision CDI, stapled. Ileostomy in place with moderate amount of loose brown output.  Large urine incontinence. Drains x2 irrgated, CDI. Assist of 2. Continue with POC and notify MD with changes or concerns.          "

## 2017-08-20 NOTE — PLAN OF CARE
Problem: Individualization  Goal: Patient Preferences     AVSS, weaned 1liter per nasal cannula, saturating mid 90s%  Patient denies pain.  Oxycodone given prior to sitting in chair.  Blood qtwjfxkc=941, 162, 210  Patient disoriented to time, otherwise mentation appears intact, has VPM, and bed alarm in place for patient safety.  Patient up to chair x2 today.  Wound care and dressing changes performed to mucous fistula, sacrum, pigtail (irrigated per order/120ml output), abdominal drain (irrigated per order/60ml output), and ileostomy (675ml output).  Patient incontinent of large volumes of urine x3.  TF infusing at 65ml/hr, poor appetite  (calorie counts documented).  Magnesium=1.7 and replaced per protocal.  Triggered sepsis protocal with lactic=0.7  Scheduled meds given as ordered.  Continue to monitor, treat as ordered, notify team with changes.   Potassium redraw=5.5 - will notify surgery crosscover.

## 2017-08-20 NOTE — PLAN OF CARE
Problem: Goal Outcome Summary  Goal: Goal Outcome Summary  Pt is afebrile. Pt is HTN but not within parameters to treat. Pt was dropping into the high 80's when he was sleeping on RA; 2L O2 per NC applied and he is now in the 90s. Pt is intermittently tachy in the low 100s. Pt c/o pain and was repositioned and received oxy with some relief. Pt is voiding adequate amounts of urine into the urinal. No BM during shift. Ileostomy is patent and draining. Large flank drain irrigated with 20 mls tonight. Both drains are patent and draining. Pt is tolerating TF at 65 well. Pt is intermittently disoriented to time and situation. Will continue to monitor care.

## 2017-08-20 NOTE — PROGRESS NOTES
Calorie Counts    Intake Recorded For: 8/19 Kcals: 819 Protein: 45g    # Meals Recorded: 3 meals    # Supplements Ordered: 0

## 2017-08-20 NOTE — PROGRESS NOTES
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 Admitted 7/4/17 from [facility] for evaluation and management of sepsis secondary to colitis, taken to OR for initial exploratory laparotomy with findings of typhlitis in the right colon, wound left open with wound vac in place for reexploration and interval closure on 7/5/2017. Concern for CMV colitis with low count CMV viremia/GI PTLD and subsequently underwent colonoscopy on 7/21, random biopsies obtained.  On 7/25/2017 patient became septic with abdominal compartment syndrome. CT noted new large heterogeneous right sided retroperitoneal gas and air tracking along duodenum and underwent a exploratory laparotomy, right angelique-colectomy, end ileostomy, mucus fistula, partial omentectomy on 7/26 by colorectal surgery.  Antibiotics discontinued 8/3. 8/6 became septic with increased abd pain, confusion. 8/8 right retroperitoneal drain placed. Additional peritoneal drain placed 8/15.    Graft Function: Good function. LFTs acceptable (checking every Monday, Thursday).   Immunosuppression Management:   CellCept discontinued (6/27/17) due to neutropenia.   Prograf goal ~5-6 due to sepsis.  Unable to obtain detectable level with suspension.  Level 8/15, 6.6 tacrolimus drip was continued at 110/hr. Convert to oral capsule 8/17, 4.5 mg BID,sara monitor levels and adjust dose accordingly  Cardiorespiratory: Stable respiratory status, NLB on RA. Assist with aggressive pulmonary toilet. OOB to chair and ambulate as tolerated.   HTN:-175, Continue amlodipine 5mg suspension. Pt on daily Florinef  Hematology: Pancytopenia present on admission, persists. Continue to hold MMF.   Primary CMV infection  Anemia- Hemoglobin8.4. Last blood transfusion on 8/18  Leukopenia-worsening. WBC 1.5, ANC 0.7. Neupogen given, no dose today  GI:   -Neutropenic colitis on admission.  Colonoscopy 7/21. Biopsy: resolving injury secondary to possible drug induced vs infectious.   -Bowel perforation: 7/25 CT scan abd/pelvis-new large heterogeneous right sided retroperitoneal gas and air tracking along duodenum.  No contrast extravasation.  No intraperitoneal air noted. Colorectal surgery performed Ex lap, right angelique-colectomy, end ileostomy, mucus fistula, partial omentectomy on 7/26. Findings no neida perforation, phlegmon/inflammation right colon. Colon pathology suggested colon perforation, negative for malignancy; CMV stain positive.    -Retroperitoneal abscess/ascites peritonitis: CT A/P 8/9 showed a persistent gas/fluid collection RLQ, peritonitis, some free air but no evidence of contrast extravasation. 8/11: Diag. Para WBC 5,038. Repeat CT scan abd/pelvis 8/14 - Complex gas-containing fluid collection in the right retroperitoneum decreased slightly in size. Moderate ascites, with intraperitoneal gas, peritonitis noted.  IR placed additional drain to abdomen with cloudy/turbid fluid. No leak identified on CT scan.   -Diet:  Regular diet with thin liquids + Nepro NJ feeds @65/hr. Initiate calorie counts.  -High output  ileostomy:  Goal ileostomy output ~ 1200 cc in 24 hrs.  Output 1.3L yesterday.  Loperamide 4mg QID/ Lomotil QID/Fiber BID.  Fluid/Electrolytes/Renal:   -Dehydration: Initiate biotene/good oral hygiene for dry mouth    -EJ: on admission, d/t ATN sec to sepsis. SCR improved 1.13   -Hypernatremia: resolved with free water.    -Hyperkalemia: continue florinef daily, will give a dose of Lasix 20mg IV today and follow K in the evening  Endocrine: DM type 2. Endocrine consulting for glycemic management. Off insulin gtt on lantus 45 units with SSI and carb coverage, appreciate endo recs.  Infectious disease: Afebrile.  WBC1.6. ID following.    -Retroperitoneal abscess: CT C/A/P 8/9 showed a persistent gas/fluid collection RLQ, peritonitis, some free air but no evidence of contrast  extravasation.  IR placed RP drain, growing clostridium- on zosyn. Per ID, if stable over the next 48 hrs, transition to Levofloxacin 750mg qday + Metronidazole 500mg TID on Sunday 8/20.  -CMV viremia: Primary CMV infection.  (CMV R-D+) CMV colitis per pathology, peak serum 7/15 4225 IU/ml.   IV Ganciclovir (started 7/20). Follow CMV PCR weekly, now <137 x 3 weeks.  Last level 8/17. Decrease Ganciclovir to 5mg/kg, consider po valcyte if next week negative.  -EBV viremia:  Peak serum 406,697 copies/ml on 7/18.  Down to 6864 as of 8/1.  Check weekly, now improved to 753 (8/15).  -R/o SBP or abscess:  8/11 paracentesis.  WBC 5,038, cx negative.  Repeat 8/15-fluid cloudy/purulent with cc=15,680.  Resolved issues:  -Severe sepsis: On admission, secondary to right colon phlegmon. Meropenem/daptomycin/micafungin tx x 10 days completed on 8/3. S/p right angelique-colectomy.  Path: CMV stain +, perforation of colon noted. Drain cultures with clostridium septicum.  Neuro: Toxic metabolic encephalopathy secondary to infection, prolonged hospital stay. Improving,virtual sitter present.   Vascular:  Evidence of microvascular injury in digits (toes) this is most likely due to injury while patient was on pressor therapy with sepsis.  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   Activity: Deconditioned due to prolong hospital course. Daily PT/OT, encourage pt to be OOB to chair. Encourage pulmonary toilet.   Prophylaxis: DVT-restart Heparin SQ  Disposition: 7A.     Medical Decision Making: Medium    PILLO/Fellow/Resident Provider:   Eric Dacosta  Fellow, Transplant surgery  Pager: [phone]    Faculty: Tip Zhang M.D.      __________________________________________________________________  Transplant History:.  3/4/2017 DD OLT with Dr. Tran (Liver), Postoperative day: 169     Interval History:   Overnight events: No acute events overnight. Tolerating diet. Reports some nausea, back pain.    ROS:    A 10-point review of systems was negative except as noted above.    Meds:    filgrastim (NEUPOGEN/GRANIX) subcutaneous  300 mcg Subcutaneous Once     insulin glargine  45 Units Subcutaneous Q24H     insulin aspart   Subcutaneous TID w/meals     tacrolimus  4.5 mg Oral BID IS     linezolid  600 mg Intravenous Q12H     artificial saliva  15 mL Swish & Spit 4x Daily     heparin  5,000 Units Subcutaneous Q8H     insulin aspart  1-12 Units Subcutaneous Q4H     - MEDICATION INSTRUCTIONS -   Does not apply Once     ganciclovir (CYTOVENE) intermittent infusion  5 mg/kg Intravenous Q24H     fludrocortisone  0.1 mg Oral Daily     amLODIPine  5 mg Oral Daily     sodium chloride (PF)  10 mL Irrigation Q8H     fiber modular  1 packet Per Feeding Tube BID     sodium chloride (PF)  20 mL Irrigation Q6H     diphenoxylate-atropine  5 mL Oral 4x Daily     saccharomyces boulardii  250 mg Oral BID     sodium bicarbonate  1,300 mg Per NG tube TID     ascorbic acid  250 mg Oral Daily     miconazole with skin protectant   Topical BID     piperacillin-tazobactam  3.375 g Intravenous Q6H     loperamide  4 mg Oral or Feeding Tube 4x Daily     protein modular  1 packet Per Feeding Tube TID     aspirin  80 mg Oral Daily     pantoprazole  40 mg Oral or Feeding Tube Daily     sodium chloride (PF)  3 mL Intracatheter Q8H       Physical Exam:     Admit Weight: 94.2 kg (207 lb 11.2 oz) (SCALE 2)    Current vitals:   BP (!) 154/95  Pulse 99  Temp 98.1  F (36.7  C)  Resp 22  Ht 1.829 m (6')  Wt 101.2 kg (223 lb)  SpO2 98%  BMI 30.24 kg/m2    Vital sign ranges:    Temp:  [97.4  F (36.3  C)-98.9  F (37.2  C)] 98.1  F (36.7  C)  Pulse:  [99] 99  Heart Rate:  [91-99] 93  Resp:  [20-28] 22  BP: (148-170)/(73-95) 154/95  SpO2:  [92 %-98 %] 98 %  Patient Vitals for the past 24 hrs:   BP Temp Temp src Pulse Heart Rate Resp SpO2 Weight   08/20/17 0737 (!) 154/95 98.1  F (36.7  C) - - 93 22 98 % -   08/20/17 0301 148/73 97.4  F (36.3  C) Oral - 91 28  98 % -   08/19/17 2344 154/87 98.2  F (36.8  C) Oral - 97 28 97 % -   08/19/17 1944 170/82 97.9  F (36.6  C) Oral 99 99 28 92 % -   08/19/17 1612 (!) 166/94 98.9  F (37.2  C) Axillary - 98 22 94 % -   08/19/17 1408 - - - - - - - 101.2 kg (223 lb)   08/19/17 1100 158/90 98.1  F (36.7  C) - - 95 20 94 % -     General Appearance: NAD  Skin: normal, warm, dry  Heart: RRR  HEENT: dry oral cavity/chapped lips, no oral ulcerations, no Thrush  Lungs: nonlabored respirations on RA  Abdomen: soft, rounded, more tender. Ileostomy with brown output. Mucus fistula covered. Midline incision, c/d/i.  Right abdominal Drains: 20F-s/s, 12F -bilious/purulent  : Crowe present.  Extremities: No edema.  Necrotic blackened areas on right 1st & 2nd toes and left 2nd toe.  Neurologic: A&O x 3, speech appropriate      Data:   CMP    Recent Labs  Lab 08/20/17  0639 08/19/17  0630 08/18/17  0538 08/17/17  0632 08/16/17  1100 08/16/17  0650    142 143 142  --  142   POTASSIUM 5.4* 4.9 4.5 4.0  --  4.0   CHLORIDE 114* 115* 116* 116*  --  117*   CO2 22 20 21 18*  --  16*   * 165* 127* 128*  --  129*   BUN 45* 50* 56* 61*  --  65*   CR 0.93 0.97 1.13 1.23  --  1.50*   GFRESTIMATED 85 81 68 62  --  49*   GFRESTBLACK >90 >90 82 75  --  59*   JACOB 8.5 8.2* 8.0* 8.2*  --  8.3*   MAG 1.7 1.7 1.7 1.9  --  1.8   PHOS 3.7 3.5 3.4 3.9  --  4.4   LIPASE  --   --   --   --   --  55*   ALBUMIN  --   --  1.7* 1.7*  --   --    BILITOTAL  --   --  0.5 0.6  --   --    ALKPHOS  --   --  236* 254*  --   --    AST  --   --  32 33  --   --    ALT  --   --  21 21  --   --    FAMY  --   --   --   --  10  --    FLIPA  --   --   --   --  34  --      CBC    Recent Labs  Lab 08/20/17  0639 08/19/17  0904   HGB 8.1* 8.7*   WBC 1.5* 2.1*   * 113*     COAGS  No lab results found in last 7 days.    Invalid input(s): XA   Urinalysis  Recent Labs   Lab Test  08/10/17   1752  08/08/17   1600   06/14/17   1508   04/11/16   1345   COLOR  Yellow  Yellow   < >    "--    < >  Yellow   APPEARANCE  Slightly Cloudy  Slightly Cloudy   < >   --    < >  Clear   URINEGLC  Negative  Negative   < >   --    < >  30*   URINEBILI  Small   This is an unconfirmed screening test result. A positive result may be false.  *  Negative   < >   --    < >  Negative   URINEKETONE  Negative  Negative   < >   --    < >  Negative   SG  1.012  1.010   < >   --    < >  1.016   UBLD  Negative  Negative   < >   --    < >  Small*   URINEPH  5.0  5.0   < >   --    < >  5.0   PROTEIN  30*  30*   < >   --    < >  30*   NITRITE  Negative  Negative   < >   --    < >  Negative   LEUKEST  Negative  Negative   < >   --    < >  Negative   RBCU  0  3*   < >   --    < >  1   WBCU  10*  7*   < >   --    < >  1   UTPG   --    --    --   1.55*   --   0.41*    < > = values in this interval not displayed.          7/21/2017   SPECIMEN(S):   A: Colon biopsy, ascending   B: Colon biopsy, descending     FINAL DIAGNOSIS:   A. COLON BIOPSY, ASCENDING:   - Colonic mucosa with no significant histologic abnormality   - Negative for intraepithelial lymphocytosis or deposition of   subepithelial thick collagenous band     B. COLON BIOPSY, DESCENDING:   - Crypt architecture distortion, consistent with healed prior injury   - Negative for active inflammation or dysplasia   - Negative for intraepithelial lymphocytosis or deposition of   subepithelial thick collagenous band   - Please, see comment     COMMENT:   Specimen B, \"descending colon\" features lamina propria edema, slight   variation in crypt sizes and shapes and occasional crypt drop-out.  This   may indicate a resolving injury which could be drug-induced or   infectious.       "

## 2017-08-20 NOTE — PROGRESS NOTES
Updated fellow  (Dr Dacosta):   - K  5.5 after IV lasix  - For IV lasix 20 mg once and then recheck    Cuca Pedersen MD  General Surgery, PGY-1,   Pager: [phone]

## 2017-08-20 NOTE — PROGRESS NOTES
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  Now s/p ex lap with R hemicolectomy, end ileostomy, mucus fistula, partial omenectomy on 7/26.        Recent Labs  Lab 08/20/17  0736 08/20/17  0639 08/20/17  0334 08/20/17  0029 08/19/17  1948 08/19/17  1614 08/19/17  1148  08/19/17  0630  08/18/17  0538  08/17/17  0632  08/16/17  0650  08/15/17  0639   GLC  --  115*  --   --   --   --   --   --  165*  --  127*  --  128*  --  129*  --  112*   *  --  126* 164* 225* 185* 251*  < >  --   < >  --   < >  --   < >  --   < >  --    < > = values in this interval not displayed.   Overall doing great on current dose of insulin.   No change today.      Active Diet Order      Regular Diet Adult Thin Liquids (water, ice chips, juice, milk, gelatin, ice cream, etc)       Plan:  -Glargine 45 units q24h   -meal aspart 1unit/7g CHO ordered for meals and snacks  -correction aspart: high intensity [id number]  -monitor glucose q4h.      Will continue to follow  Please notify diabetes team of changes planned for nutrition support schedule.      Outpatient diabetes follow up: Dr. Eckert at [facility]     Patient was discussed with Dr. Hernandez.     Lui Avila MD  Endocrinology Fellow  Pager [phone]      Active Diet Order      Regular Diet Adult Thin Liquids (water, ice chips, juice, milk, gelatin, ice cream, etc)    Data:        Recent Labs  Lab 08/20/17  0736 08/20/17  0639 08/20/17  0334 08/20/17  0029 08/19/17  1948 08/19/17  1614 08/19/17  1148  08/19/17  0630  08/18/17  0538  08/17/17  0632  08/16/17  0650  08/15/17  0639   GLC  --  115*  --   --   --   --   --   --  165*  --  127*  --  128*  --   129*  --  112*   *  --  126* 164* 225* 185* 251*  < >  --   < >  --   < >  --   < >  --   < >  --    < > = values in this interval not displayed.  Lab Results   Component Value Date    A1C  07/06/2017     Canceled, Test credited   Below Assay Range  NOTIFIED LEONARD ONEILL AT 0538 ON 7/6/17 BY CHRISSY      A1C 9.4 02/16/2017    A1C 6.2 12/14/2016    A1C 6.1 10/27/2016    A1C 8.3 07/02/2012     --- Addendum----  I saw the patient with endocrine fellow Dr. Avila and directly examined patient and discussed. Agree above note and plan.     Kaci Hernandez MD  Staff Physician  Endocrinology and Metabolism  [facility] [employer]  License: [id number]  Pager: [phone]

## 2017-08-21 ENCOUNTER — APPOINTMENT (OUTPATIENT)
Dept: PHYSICAL THERAPY | Facility: CLINIC | Age: 52
End: 2017-08-21
Attending: TRANSPLANT SURGERY
Payer: COMMERCIAL

## 2017-08-21 LAB
ALBUMIN SERPL-MCNC: 1.6 G/DL (ref 3.4–5)
ALP SERPL-CCNC: 245 U/L (ref 40–150)
ALT SERPL W P-5'-P-CCNC: 24 U/L (ref 0–70)
ANION GAP SERPL CALCULATED.3IONS-SCNC: 5 MMOL/L (ref 3–14)
ANISOCYTOSIS BLD QL SMEAR: ABNORMAL
AST SERPL W P-5'-P-CCNC: 39 U/L (ref 0–45)
BASOPHILS # BLD AUTO: 0 10E9/L (ref 0–0.2)
BASOPHILS NFR BLD AUTO: 1.7 %
BILIRUB DIRECT SERPL-MCNC: 0.3 MG/DL (ref 0–0.2)
BILIRUB SERPL-MCNC: 0.4 MG/DL (ref 0.2–1.3)
BUN SERPL-MCNC: 48 MG/DL (ref 7–30)
CALCIUM SERPL-MCNC: 8.6 MG/DL (ref 8.5–10.1)
CHLORIDE SERPL-SCNC: 113 MMOL/L (ref 94–109)
CO2 SERPL-SCNC: 23 MMOL/L (ref 20–32)
CREAT SERPL-MCNC: 1 MG/DL (ref 0.66–1.25)
DIFFERENTIAL METHOD BLD: ABNORMAL
EOSINOPHIL # BLD AUTO: 0 10E9/L (ref 0–0.7)
EOSINOPHIL NFR BLD AUTO: 0 %
ERYTHROCYTE [DISTWIDTH] IN BLOOD BY AUTOMATED COUNT: 18.1 % (ref 10–15)
GFR SERPL CREATININE-BSD FRML MDRD: 78 ML/MIN/1.7M2
GLUCOSE BLDC GLUCOMTR-MCNC: 109 MG/DL (ref 70–99)
GLUCOSE BLDC GLUCOMTR-MCNC: 126 MG/DL (ref 70–99)
GLUCOSE BLDC GLUCOMTR-MCNC: 145 MG/DL (ref 70–99)
GLUCOSE BLDC GLUCOMTR-MCNC: 172 MG/DL (ref 70–99)
GLUCOSE BLDC GLUCOMTR-MCNC: 185 MG/DL (ref 70–99)
GLUCOSE BLDC GLUCOMTR-MCNC: 202 MG/DL (ref 70–99)
GLUCOSE SERPL-MCNC: 129 MG/DL (ref 70–99)
HCT VFR BLD AUTO: 23.5 % (ref 40–53)
HGB BLD-MCNC: 7.6 G/DL (ref 13.3–17.7)
LYMPHOCYTES # BLD AUTO: 0.7 10E9/L (ref 0.8–5.3)
LYMPHOCYTES NFR BLD AUTO: 40.5 %
MAGNESIUM SERPL-MCNC: 1.9 MG/DL (ref 1.6–2.3)
MCH RBC QN AUTO: 29.8 PG (ref 26.5–33)
MCHC RBC AUTO-ENTMCNC: 32.3 G/DL (ref 31.5–36.5)
MCV RBC AUTO: 92 FL (ref 78–100)
METAMYELOCYTES # BLD: 0 10E9/L
METAMYELOCYTES NFR BLD MANUAL: 0.9 %
MONOCYTES # BLD AUTO: 0.1 10E9/L (ref 0–1.3)
MONOCYTES NFR BLD AUTO: 8.6 %
NEUTROPHILS # BLD AUTO: 0.8 10E9/L (ref 1.6–8.3)
NEUTROPHILS NFR BLD AUTO: 48.3 %
OVALOCYTES BLD QL SMEAR: SLIGHT
PHOSPHATE SERPL-MCNC: 3.4 MG/DL (ref 2.5–4.5)
PLATELET # BLD AUTO: 95 10E9/L (ref 150–450)
PLATELET # BLD EST: ABNORMAL 10*3/UL
POIKILOCYTOSIS BLD QL SMEAR: SLIGHT
POTASSIUM SERPL-SCNC: 5.2 MMOL/L (ref 3.4–5.3)
PROT SERPL-MCNC: 6 G/DL (ref 6.8–8.8)
RBC # BLD AUTO: 2.55 10E12/L (ref 4.4–5.9)
SODIUM SERPL-SCNC: 141 MMOL/L (ref 133–144)
TACROLIMUS BLD-MCNC: 4.4 UG/L (ref 5–15)
TME LAST DOSE: ABNORMAL H
WBC # BLD AUTO: 1.7 10E9/L (ref 4–11)

## 2017-08-21 PROCEDURE — 25000128 H RX IP 250 OP 636: Performed by: STUDENT IN AN ORGANIZED HEALTH CARE EDUCATION/TRAINING PROGRAM

## 2017-08-21 PROCEDURE — 25000128 H RX IP 250 OP 636: Performed by: TRANSPLANT SURGERY

## 2017-08-21 PROCEDURE — 25800025 ZZH RX 258: Performed by: CLINICAL NURSE SPECIALIST

## 2017-08-21 PROCEDURE — 40000193 ZZH STATISTIC PT WARD VISIT

## 2017-08-21 PROCEDURE — 25000131 ZZH RX MED GY IP 250 OP 636 PS 637: Performed by: TRANSPLANT SURGERY

## 2017-08-21 PROCEDURE — 25000132 ZZH RX MED GY IP 250 OP 250 PS 637: Performed by: STUDENT IN AN ORGANIZED HEALTH CARE EDUCATION/TRAINING PROGRAM

## 2017-08-21 PROCEDURE — 25000132 ZZH RX MED GY IP 250 OP 250 PS 637: Performed by: TRANSPLANT SURGERY

## 2017-08-21 PROCEDURE — 80048 BASIC METABOLIC PNL TOTAL CA: CPT | Performed by: STUDENT IN AN ORGANIZED HEALTH CARE EDUCATION/TRAINING PROGRAM

## 2017-08-21 PROCEDURE — 25000128 H RX IP 250 OP 636: Performed by: INTERNAL MEDICINE

## 2017-08-21 PROCEDURE — 83735 ASSAY OF MAGNESIUM: CPT | Performed by: STUDENT IN AN ORGANIZED HEALTH CARE EDUCATION/TRAINING PROGRAM

## 2017-08-21 PROCEDURE — 27210913 ZZH NUTRITION PRODUCT INTERMEDIATE PACKET

## 2017-08-21 PROCEDURE — 85025 COMPLETE CBC W/AUTO DIFF WBC: CPT | Performed by: STUDENT IN AN ORGANIZED HEALTH CARE EDUCATION/TRAINING PROGRAM

## 2017-08-21 PROCEDURE — 25000132 ZZH RX MED GY IP 250 OP 250 PS 637: Performed by: SURGERY

## 2017-08-21 PROCEDURE — 27210432 ZZH NUTRITION PRODUCT RENAL BASIC LITER

## 2017-08-21 PROCEDURE — 97530 THERAPEUTIC ACTIVITIES: CPT | Mod: GP

## 2017-08-21 PROCEDURE — 25000132 ZZH RX MED GY IP 250 OP 250 PS 637: Performed by: COLON & RECTAL SURGERY

## 2017-08-21 PROCEDURE — 25000125 ZZHC RX 250: Performed by: STUDENT IN AN ORGANIZED HEALTH CARE EDUCATION/TRAINING PROGRAM

## 2017-08-21 PROCEDURE — 25000128 H RX IP 250 OP 636: Performed by: PHYSICIAN ASSISTANT

## 2017-08-21 PROCEDURE — 25000132 ZZH RX MED GY IP 250 OP 250 PS 637: Performed by: PHYSICIAN ASSISTANT

## 2017-08-21 PROCEDURE — 99212 OFFICE O/P EST SF 10 MIN: CPT

## 2017-08-21 PROCEDURE — 80076 HEPATIC FUNCTION PANEL: CPT | Performed by: STUDENT IN AN ORGANIZED HEALTH CARE EDUCATION/TRAINING PROGRAM

## 2017-08-21 PROCEDURE — 12000025 ZZH R&B TRANSPLANT INTERMEDIATE

## 2017-08-21 PROCEDURE — 40000802 ZZH SITE CHECK

## 2017-08-21 PROCEDURE — 80197 ASSAY OF TACROLIMUS: CPT | Performed by: STUDENT IN AN ORGANIZED HEALTH CARE EDUCATION/TRAINING PROGRAM

## 2017-08-21 PROCEDURE — 36415 COLL VENOUS BLD VENIPUNCTURE: CPT | Performed by: STUDENT IN AN ORGANIZED HEALTH CARE EDUCATION/TRAINING PROGRAM

## 2017-08-21 PROCEDURE — 25000128 H RX IP 250 OP 636: Performed by: NURSE PRACTITIONER

## 2017-08-21 PROCEDURE — 84100 ASSAY OF PHOSPHORUS: CPT | Performed by: STUDENT IN AN ORGANIZED HEALTH CARE EDUCATION/TRAINING PROGRAM

## 2017-08-21 PROCEDURE — 00000146 ZZHCL STATISTIC GLUCOSE BY METER IP

## 2017-08-21 PROCEDURE — 97116 GAIT TRAINING THERAPY: CPT | Mod: GP

## 2017-08-21 RX ADMIN — DEXTROSE MONOHYDRATE: 100 INJECTION, SOLUTION INTRAVENOUS at 22:41

## 2017-08-21 RX ADMIN — Medication 80 MG: at 08:36

## 2017-08-21 RX ADMIN — GANCICLOVIR SODIUM 450 MG: 500 INJECTION, POWDER, LYOPHILIZED, FOR SOLUTION INTRAVENOUS at 16:52

## 2017-08-21 RX ADMIN — PIPERACILLIN AND TAZOBACTAM 3.38 G: 3; .375 INJECTION, POWDER, LYOPHILIZED, FOR SOLUTION INTRAVENOUS; PARENTERAL at 13:57

## 2017-08-21 RX ADMIN — LOPERAMIDE HYDROCHLORIDE 4 MG: 2 SOLUTION ORAL at 08:36

## 2017-08-21 RX ADMIN — HEPARIN SODIUM 5000 UNITS: 5000 INJECTION, SOLUTION INTRAVENOUS; SUBCUTANEOUS at 14:02

## 2017-08-21 RX ADMIN — Medication 2 G: at 10:37

## 2017-08-21 RX ADMIN — Medication 250 MG: at 19:56

## 2017-08-21 RX ADMIN — TACROLIMUS 5 MG: 5 CAPSULE ORAL at 18:28

## 2017-08-21 RX ADMIN — MICONAZOLE NITRATE: 20 CREAM TOPICAL at 08:48

## 2017-08-21 RX ADMIN — FUROSEMIDE 20 MG: 10 INJECTION, SOLUTION INTRAVENOUS at 08:29

## 2017-08-21 RX ADMIN — ONDANSETRON 4 MG: 2 INJECTION INTRAMUSCULAR; INTRAVENOUS at 13:57

## 2017-08-21 RX ADMIN — Medication 15 ML: at 08:57

## 2017-08-21 RX ADMIN — Medication 5 ML: at 16:52

## 2017-08-21 RX ADMIN — SODIUM BICARBONATE 650 MG TABLET 1300 MG: at 19:56

## 2017-08-21 RX ADMIN — LOPERAMIDE HYDROCHLORIDE 4 MG: 2 SOLUTION ORAL at 12:59

## 2017-08-21 RX ADMIN — TACROLIMUS 5 MG: 5 CAPSULE ORAL at 08:54

## 2017-08-21 RX ADMIN — PIPERACILLIN AND TAZOBACTAM 3.38 G: 3; .375 INJECTION, POWDER, LYOPHILIZED, FOR SOLUTION INTRAVENOUS; PARENTERAL at 04:17

## 2017-08-21 RX ADMIN — ACETAMINOPHEN 650 MG: 325 TABLET ORAL at 09:12

## 2017-08-21 RX ADMIN — PANTOPRAZOLE SODIUM 40 MG: 40 TABLET, DELAYED RELEASE ORAL at 08:36

## 2017-08-21 RX ADMIN — Medication 250 MG: at 08:36

## 2017-08-21 RX ADMIN — Medication 15 ML: at 13:57

## 2017-08-21 RX ADMIN — SODIUM BICARBONATE 650 MG TABLET 1300 MG: at 08:54

## 2017-08-21 RX ADMIN — Medication 1 PACKET: at 08:36

## 2017-08-21 RX ADMIN — PIPERACILLIN AND TAZOBACTAM 3.38 G: 3; .375 INJECTION, POWDER, LYOPHILIZED, FOR SOLUTION INTRAVENOUS; PARENTERAL at 19:57

## 2017-08-21 RX ADMIN — PIPERACILLIN AND TAZOBACTAM 3.38 G: 3; .375 INJECTION, POWDER, LYOPHILIZED, FOR SOLUTION INTRAVENOUS; PARENTERAL at 08:29

## 2017-08-21 RX ADMIN — LOPERAMIDE HYDROCHLORIDE 4 MG: 2 SOLUTION ORAL at 16:51

## 2017-08-21 RX ADMIN — LINEZOLID 600 MG: 600 INJECTION, SOLUTION INTRAVENOUS at 04:45

## 2017-08-21 RX ADMIN — FUROSEMIDE 20 MG: 10 INJECTION, SOLUTION INTRAVENOUS at 16:51

## 2017-08-21 RX ADMIN — Medication 5 ML: at 19:57

## 2017-08-21 RX ADMIN — Medication 1 PACKET: at 12:58

## 2017-08-21 RX ADMIN — INSULIN GLARGINE 45 UNITS: 100 INJECTION, SOLUTION SUBCUTANEOUS at 08:57

## 2017-08-21 RX ADMIN — HEPARIN SODIUM 5000 UNITS: 5000 INJECTION, SOLUTION INTRAVENOUS; SUBCUTANEOUS at 21:48

## 2017-08-21 RX ADMIN — SODIUM BICARBONATE 650 MG TABLET 1300 MG: at 14:34

## 2017-08-21 RX ADMIN — FLUDROCORTISONE ACETATE 0.1 MG: 0.1 TABLET ORAL at 08:54

## 2017-08-21 RX ADMIN — HEPARIN SODIUM 5000 UNITS: 5000 INJECTION, SOLUTION INTRAVENOUS; SUBCUTANEOUS at 06:34

## 2017-08-21 RX ADMIN — Medication 1 PACKET: at 17:12

## 2017-08-21 RX ADMIN — Medication 5 ML: at 12:59

## 2017-08-21 RX ADMIN — LOPERAMIDE HYDROCHLORIDE 4 MG: 2 SOLUTION ORAL at 19:57

## 2017-08-21 RX ADMIN — ALTEPLASE 2 MG: 2.2 INJECTION, POWDER, LYOPHILIZED, FOR SOLUTION INTRAVENOUS at 05:00

## 2017-08-21 RX ADMIN — LINEZOLID 600 MG: 600 INJECTION, SOLUTION INTRAVENOUS at 14:36

## 2017-08-21 RX ADMIN — ASCORBIC ACID TAB 250 MG 250 MG: 250 TAB at 08:54

## 2017-08-21 RX ADMIN — AMLODIPINE BESYLATE 5 MG: 10 TABLET ORAL at 08:36

## 2017-08-21 ASSESSMENT — PAIN DESCRIPTION - DESCRIPTORS
DESCRIPTORS: ACHING

## 2017-08-21 NOTE — PLAN OF CARE
Problem: Goal Outcome Summary  Goal: Goal Outcome Summary  Outcome: No Change  Alert, oriented to place and self, mental clarity waxes and wanes. Intermittently irritable. Hypertensive, not requiring hydralazine, OVSS on RA. Back/sacral pain with OOB activity, only tolerated sitting in chair for about 15-20 mins this morning. PT yet to see today. Tylenol given with relief. Did eat well for breakfast, received 7 units carb coverage. C/O abdominal discomfort at lunch time, zofran given and Camacho did drink an ensure. Tolerating TF at 65cc/hour. Ostomy with soft stool, scheduled lomotil held once today. Ostomy bag intact. Voided in urinal three times today, no incontinence. Drains irrigated per orders, some leaking at connection of vazquez bag and drain. Coccyx assessed by WOC today. Mg 1.9 and replaced per protocol. K 5.2. Continue encouraging OOB activity.

## 2017-08-21 NOTE — PROVIDER NOTIFICATION
HYPERKALEMIA  D:  Potassium=5.5  I:  Dr. Pedersen notified and made aware.  Order to give 20mg IV lasix x1 then recheck.  Paged MD to order or notify nursing regarding time of recheck.    A:  Patient otherwise stable.  P:  Continue to monitor, treat as ordered, notify team with changes.

## 2017-08-21 NOTE — PROGRESS NOTES
[facility]    Transplant Infectious Diseases Inpatient Progress note      Camacho Bhagat MRN# [id number]   YOB: 1965 Age: 51 year old   Date of Admission: 7/4/2017  4:45 AM  Transplant: 3/4/2017 (Liver), Postoperative day: 170            Recommendations:   1. DC GCV.   2. Start VGCV 900 mg daily till 8/26/2017 to finish 2 week course of maintenance therapy.   3. Continue to check CMV PCR weekly till the 6 month yokasta of 9/4/2017 then may go to every other week or even monthly for couple of more months.   4. Would give another dose of G-CSF.   5. DC zosyn   6. Start augmentin 875 mg bid.   7. Would substitute doxycycline 100 mg bid for linezolid for 7 more days.   8. Continue to monitor EBV, I would go to monthly instead of every other week.         Summary of Presentation:   Transplants:  3/4/2017 (Liver), Postoperative day:  170     This patient is a 51 year old male with CASTAÑEDA s/p OLT in 3/2017. Basilixima for induction.   MMF was held due to neutropenia and multiple infections upon admission. TAC was held 7/25/2017 for perforated viscus.      Presented with colitis, s/p exploratory laparotomy. Attributed to neutropenic colitis.   Then started to develop CMV viremia (primary infection)  Now with intra-abdominal abscess and severe sepsis.         Active Problems and Infectious Diseases Issues:   1. CMV viremia (donor derived).   With or without colitis.   The resected colon on 7/26/2017 showed perforation but only 3 cells which were positive for CMV staining. This is less likely to be CMV colitis but treated as such with induction dose of IV GCV from 7/20/2017 till 8/11/2017 (3 weeks) then switched to maintenance dose on 8/12/2017. We should be able to finish the maintenance 2 week course on 8/26/2017.     2. Leukopenia/neutropenia.   Likely drug induced.   Bactrim DCed again 8/17/2017 and the patient was given pentamidine instead.   The patient also has EBV viremia and PTLD  is always possible but that should be entertained if WBC/ANC do not improve after the discontinuation of GCV if EBV viral load is rising.     3. Colon perforation   4. Intraabdominal abscess  Has been on zosyn since 8/9/2017.   With no fever and no signs or symptoms to suggest sepsis we should be able to switch to PO ABx such as augmentin till 9/6/2017 for total of 4 weeks (though likely longer since he's been off and on AB since admission).      5. Erythema of the index.   I think this is part of the ischemic process involving the right index, whether it's going to be progressed into dry ischemia or wet ischemia is not clear but I don't think ABx are going to prevent this from happening. If primary team feels that this ischemic process should be treated I would use doxycycline which has gram positive coverage and some Gram negative coverage.     6. EBV primary infection (donor derived)  Subsiding.     Other Infectious Disease issues include  - PCP prophylaxis: bactrim DCed in June of 2017 due to neutropenia. Resumed briefly to be DCed again on 8/17/2017 due to leukopenia. The patient received pentamidine on 8/12/2017.   - Serostatus: CMV D+/R-, EBV D+/R-, HSV1?/2?, VZV ?    Now he's on GCV IV.   - Immunization status: up-to-date  - Gamma globulin status: >1660 as of 7/24/2017.       Attestation:  I interviewed the patient and obtained history from the patient, and by reviewing the patient's chart including outside records, microbiological data, and radiological data. All data are summarized in this notes.  Naseem Figueroa   Pager: [phone]  08/21/2017        HPI and interim history:  In summary:  CASTAÑEDA s/p OLT in 3/2017.   Presented with abdominal pain and underwent exploratory laparotomy which was not c/w with ischemia. It was felt to be related to neutropenia.   The patient has neutropenia since June of 2017 attributed to immunosuppressive therapy/bactrim/VGCV. Due to neutropenia, bactrim DCed June of 2017 and  VGCV was DCed at unknown tome.     The patient was started on broad spectrum ABx with persistent fever.   Bronchoscopy done 7/12/2017 grew single colony of VRE which was considered a colonizer.   Ascitic fluid checked on 7/5/2017 and grew S capitis considered a contaminant.   Repeat BAL on 7/15/2017 and repeat paracentesis on 7/7/14/2017 were unremarkable for growth but the ascitic fluid was exudative.     The course was complicated by thrombosis of the right radial artery with ischemia to to the tip of the right index finger.   He also has ischemia to the right hallux and second toe.     The patient finished a course of ertapenem for colitis/fever/sepsis, his mental status started to improve.   CMV PCR became detected at low level and workup for possible CMV colitis was initiated. Colonoscopy done 7/21/2017 was with poor prep and random biopsies were negative for colitis including CMV colitis. GCV was initiated on 7/20/2017 (repeat CMV PCR was 3.3 log on 7/20/2017, a 0.3 log increase from CMV PCR done on 7/18/2017).     On 7/25/2017 he suffered from acute respiratory distress and became obtunded with distended abdomen. He was found to have new free air and intra-abdominal fluid collection.    On 7/26/2017 he underwent exp lap with right hemicolectomy, end ileostomy, and mucus fistula. The pathology showed perforation with only 3 cells positive for CMV staining.   GCV IV was continued with broad spectrum ABx including meropenem/daptomycin/micafungin till around 8/3/2017.    IV GCV was switched to maintenance dose on 8/12/2017 after 2 values of CMV VL <137 IU/mL.     The patient continued to have intermittent fevers and altered mentation with ID concerned about persistent fistula. On 8/9/2017 the patient underwent CT guided drainage of retroperitoneal fluid collection which was a very difficult procedure. The fluid was described as serosanguinous but grew Cl septicum. The patient was started on linezolid/zosyn on  8/9/2017. On 8/14/2017 another CT showed persistent free air and and peritonitis. A new drain was placed on 8/15/2017 that showed inflammatory process with WBC of >15,000 but with negative cx including anaerobic cx.   Worthwhile to mention that in between the patient also underwent paracentesis on 8/11/2017 with WBC of 5000 and positive cx for Cl septicum and negative fungal cx.     The patient was continued on zosyn, linezolid was DCed on 8/15/2017 to be resumed again on 8/19/2017 for cellulitis of the right finger which sustained (among other fingers) ischemic injury during the hospital course.     The patient did have N/V yesterday but today he has no N/V and no other new complaints. No major events to report. No abdominal pain.       ROS:  As mentioned in the interim history otherwise negative by reviewing central and peripheral neurological systems, respiratory system, cardiac system, GI system,  system, musculoskeletal, skin, allergy, and lymphatics.                  Pysical Examination:  Temp: 97.8  F (36.6  C) Temp src: Oral BP: (!) 174/94 Pulse: 90 Heart Rate: 91 Resp: 22 SpO2: 93 % O2 Device: None (Room air) Oxygen Delivery: 1 LPM  Vitals:    08/14/17 1000 08/17/17 0755 08/18/17 0900 08/19/17 1408   Weight: 89.4 kg (197 lb) 91.8 kg (202 lb 6.4 oz) 96.9 kg (213 lb 9.6 oz) 101.2 kg (223 lb)    08/20/17 0900   Weight: 99.8 kg (220 lb 1.6 oz)     Constitutional: lying in bed in no apparent distress, answers questions and follows commands.    CVS: RRR, normal S1/S2, no murmur.   Lungs: Normal BS anteriorly, no accessory muscle use.   Abdomen: distended, soft but tender in the RUQ, no masses, no bruit, normal BS, midline wound is intact with no dehiscence or erythema and no drainage, RLQ ileostomy and proximal midline wound with mucus fistula.   Extremities: no pitting edema of bilateral lower extremities, ischemic right hallux and right second toe, also ischemic tip of left second toe, and right index with  mild blanching erythema, no ulcers.    Skin: as the exam of the extremities and the abdomen, no induration, fluctuation or discharge,and no rash       Medications:  Medications that Require Transfusion:     IV fluid REPLACEMENT ONLY       NaCl 10 mL/hr at 08/14/17 0101   Scheduled Medications:     tacrolimus  5 mg Oral BID IS     furosemide  20 mg Intravenous BID     insulin glargine  45 Units Subcutaneous Q24H     insulin aspart   Subcutaneous TID w/meals     linezolid  600 mg Intravenous Q12H     artificial saliva  15 mL Swish & Spit 4x Daily     heparin  5,000 Units Subcutaneous Q8H     insulin aspart  1-12 Units Subcutaneous Q4H     - MEDICATION INSTRUCTIONS -   Does not apply Once     ganciclovir (CYTOVENE) intermittent infusion  5 mg/kg Intravenous Q24H     fludrocortisone  0.1 mg Oral Daily     amLODIPine  5 mg Oral Daily     sodium chloride (PF)  10 mL Irrigation Q8H     fiber modular  1 packet Per Feeding Tube BID     sodium chloride (PF)  20 mL Irrigation Q6H     diphenoxylate-atropine  5 mL Oral 4x Daily     saccharomyces boulardii  250 mg Oral BID     sodium bicarbonate  1,300 mg Per NG tube TID     ascorbic acid  250 mg Oral Daily     miconazole with skin protectant   Topical BID     piperacillin-tazobactam  3.375 g Intravenous Q6H     loperamide  4 mg Oral or Feeding Tube 4x Daily     protein modular  1 packet Per Feeding Tube TID     aspirin  80 mg Oral Daily     pantoprazole  40 mg Oral or Feeding Tube Daily     sodium chloride (PF)  3 mL Intracatheter Q8H       Laboratory Data:   No results found for: ACD4    Inflammatory Markers    Recent Labs   Lab Test  08/16/17   0650  08/09/17   0711  08/07/17   0549  08/06/17   0633  08/05/17   0616  07/05/17   1810  03/05/17   0358  11/23/16   0813   08/15/11   1151  04/11/11   1209  01/03/11   1246  02/15/10   1232   SED   --    --    --    --    --    --    --   45*   --   11  14  17*  25*   CRP  140.0*  190.0*  130.0*  130.0*  140.0*  354.0  20.0*  58.0*    < >  11.1*  9.0*  11.7*  17.5*    < > = values in this interval not displayed.       Immune Globulin Studies     Recent Labs   Lab Test  07/24/17   0705  08/15/11   1243   IGG  1660*  1160   IGG1   --   734   IGG2   --   294   IGG3   --   7*   IGG4   --   59       Metabolic Studies       Recent Labs   Lab Test  08/21/17   0649  08/20/17   2143  08/20/17   1640  08/20/17   0639  08/19/17   0904  08/19/17   0630  08/18/17   0538   08/17/17   0632   08/01/17   0651   07/25/17   0945   07/06/17   0316   NA  141   --   141  142   --   142  143   --   142   < >  150*   < >  137   < >  143   POTASSIUM  5.2  5.1  5.5*  5.4*   --   4.9  4.5   --   4.0   < >  4.3   < >  4.0   < >  5.0   CHLORIDE  113*   --   115*  114*   --   115*  116*   --   116*   < >  123*   < >  104   < >  116*   CO2  23   --   21  22   --   20  21   --   18*   < >  20   < >  26   < >  18*   ANIONGAP  5   --   6  6   --   6  6   --   8   < >  7   < >  7   < >  9   BUN  48*   --   48*  45*   --   50*  56*   --   61*   < >  51*   < >  77*   < >  62*   CR  1.00   --   0.98  0.93   --   0.97  1.13   --   1.23   < >  0.90   < >  2.60*   < >  2.75*   GFRESTIMATED  78   --   80  85   --   81  68   --   62   < >  89   < >  26*   < >  24*   GLC  129*   --   218*  115*   --   165*  127*   --   128*   < >  141*   < >  382*   < >  139*   A1C   --    --    --    --    --    --    --    --    --    --    --    --    --    --   Canceled, Test credited   Below Assay Range  NOTIFIED LEONARD ONEILL AT 0538 ON 7/6/17 BY CHRISSY     JACOB  8.6   --   8.1*  8.5   --   8.2*  8.0*   --   8.2*   < >  8.1*   < >  7.6*   < >  6.9*   PHOS  3.4   --   3.9  3.7   --   3.5  3.4   --   3.9   < >  3.1   < >   --    < >  5.5*   MAG  1.9   --    --   1.7   --   1.7  1.7   --   1.9   < >  1.9   < >   --    < >  2.1   LACT   --    --   0.7   --   0.7   --    --    < >   --    < >   --    < >   --    < >  1.5   CKT   --    --    --    --    --    --    --    --    --    --   16*   --   40   --     --     < > = values in this interval not displayed.       Hepatic Studies    Recent Labs   Lab Test  08/21/17   0649  08/20/17   1640  08/18/17   0538  08/17/17   0632  08/14/17   0621  08/10/17   1323  08/10/17   0704  08/08/17   0600   07/25/17   0017   07/11/17   0328   BILITOTAL  0.4   --   0.5  0.6  0.7   --   2.2*  0.5   < >   --    < >  0.5   ALKPHOS  245*   --   236*  254*  271*   --   136  172*   < >   --    < >  158*   ALBUMIN  1.6*  1.6*  1.7*  1.7*  1.8*   --   1.9*  2.1*   < >   --    < >  1.8*   AST  39   --   32  33  24   --   36  28   < >   --    < >  34   ALT  24   --   21 21  17   --   26  25   < >   --    < >  27   LDH   --    --    --    --    --   168   --    --    --   258*   --    --    GGT   --    --    --    --    --    --    --    --    --    --    --   58    < > = values in this interval not displayed.       Pancreatitis testing    Recent Labs   Lab Test  08/16/17   0650  07/24/17   0705  07/17/17   0630  07/12/17   0342  07/10/17   0340  07/05/17   0346  03/05/17   0358  03/03/17   1458  02/21/17   1520  12/09/16   1159  02/08/16   1144   09/30/10   1734   AMYLASE   --    --    --    --    --    --   53  70   --    --    --    --   82   LIPASE  55*   --    --    --    --    --   216   --   326  161   --    --   138   TRIG   --   303*  168*  114  177*  174*   --    --    --    --   99   < >   --     < > = values in this interval not displayed.       Hematology Studies      Recent Labs   Lab Test  08/21/17   0649  08/20/17   0639  08/19/17   0904  08/18/17   0538  08/17/17   0632  08/16/17   0650  08/15/17   0639   WBC  1.7*  1.5*  2.1*  1.6*  1.9*  2.0*  2.7*   ANEU  0.8*  0.7*  1.1*  0.8*  1.1*   --   1.6   ALYM  0.7*  0.5*  0.6*  0.6*  0.6*   --   0.7*   ZINA  0.1  0.2  0.3  0.2  0.3   --   0.3   AEOS  0.0  0.0  0.0  0.0  0.0   --   0.0   HGB  7.6*  8.1*  8.7*  6.9*  7.3*  6.9*  7.4*   HCT  23.5*  25.9*  27.2*  21.7*  22.8*  21.2*  23.3*   PLT  95*  111*  113*  107*  107*  102*  118*        Arterial Blood Gas Testing    Recent Labs   Lab Test  08/10/17   1515  08/02/17   1216  07/26/17   2243  07/26/17   2133   07/26/17 2017 07/25/17   1746   PH  7.33*  7.37  Incorrect specimen type  NOTTIED BY WOJCIECH DEWITT, NEW ORDER TO REPLACE.7/26/17 AT 2346 BY ZAINAB.  CORRECTED ON 07/26 AT 2350: PREVIOUSLY REPORTED AS 7.27     --    --   Canceled, Test credited   Incorrect specimen type    7.32*   PCO2  34*  31*  Incorrect specimen type  NOTTIED BY WOJCIECH DEWITT, NEW ORDER TO REPLACE.7/26/17 AT 2346 BY ZAINAB.  CORRECTED ON 07/26 AT 2350: PREVIOUSLY REPORTED AS 45     --    --   Canceled, Test credited   Incorrect specimen type    42   PO2  68*  69*  Incorrect specimen type  NOTTIED BY WOJCIECH DEWITT, NEW ORDER TO REPLACE.7/26/17 AT 2346 BY ZAINAB.  CORRECTED ON 07/26 AT 2350: PREVIOUSLY REPORTED AS 53     --    --   Canceled, Test credited   Incorrect specimen type    81   HCO3  18*  18*  Incorrect specimen type  NOTTIED BY WOJCIECH DEWITT, NEW ORDER TO REPLACE.7/26/17 AT 2346 BY ZAINAB.  CORRECTED ON 07/26 AT 2350: PREVIOUSLY REPORTED AS 20     --    --   Canceled, Test credited   Incorrect specimen type    22   O2PER  2L  21  Incorrect specimen type  NOTTIED BY WOJCIECH DEWITT, NEW ORDER TO REPLACE.7/26/17 AT 2346 BY ZAINAB.  CORRECTED ON 07/26 AT 2350: PREVIOUSLY REPORTED AS 40    40  62   < >  100.0  100  40.0    < > = values in this interval not displayed.        Urine Studies     Recent Labs   Lab Test  08/10/17   1752  08/08/17   1600  08/05/17   0617  07/28/17   1042  07/25/17   1205   URINEPH  5.0  5.0  5.0  5.0  5.0   NITRITE  Negative  Negative  Negative  Negative  Negative   LEUKEST  Negative  Negative  Negative  Negative  Trace*   WBCU  10*  7*  5*  6*  5*       Vancomycin Levels     Recent Labs   Lab Test  07/06/17   0316  10/28/16   1405   VANCOMYCIN  22.1  14.4       Tacrolimus levels    Invalid input(s): TACROLIMUS, TAC, TACR  Transplant Immunosuppression Labs Latest Ref Rng & Units 8/21/2017 8/20/2017  8/20/2017 8/19/2017 8/18/2017   Tacro Level 5.0 - 15.0 ug/L 4.4(L) - 4.6(L) 4.5(L) 23.8(HH)   Tacro Level - Not Provided - Not Provided Not Provided Not Provided   Creat 0.66 - 1.25 mg/dL 1.00 0.98 0.93 0.97 1.13   BUN 7 - 30 mg/dL 48(H) 48(H) 45(H) 50(H) 56(H)   WBC 4.0 - 11.0 10e9/L 1.7(L) - 1.5(L) 2.1(L) 1.6(L)   Neutrophil % 48.3 - 46.8 51.5 47.0   ANEU 1.6 - 8.3 10e9/L 0.8(L) - 0.7(L) 1.1(L) 0.8(L)       CSF testing     Recent Labs   Lab Test  06/11/09   0225   CWBC  1   CRBC  2   CGLU  104*   CTP  54         Microbiology:  As reviewed in the HPI paragraph.     Last check of C difficile  C Diff Toxin B PCR   Date Value Ref Range Status   08/13/2017  NEG Final    Negative  Negative: Clostridium difficile target DNA sequences NOT detected, presumed   negative for Clostridium difficile toxin B or the number of bacteria present   may be below the limit of detection for the test.   FDA approved assay performed using [employer] GeneXpert real-time PCR.   A negative result does not exclude actual disease due to Clostridium difficile   and may be due to improper collection, handling and storage of the specimen or   the number of organisms in the specimen is below the detection limit of the   assay.         Virology:  CMV viral loads    Recent Labs   Lab Test  08/17/17   0632  08/10/17   0704  08/03/17   0533  07/27/17   1109  07/20/17   1427   CSPEC  Plasma, EDTA anticoagulant  EDTA PLASMA  CORRECTED ON 08/11 AT 0714: PREVIOUSLY REPORTED AS Whole Blood    EDTA PLASMA  EDTA PLASMA  CORRECTED ON 07/28 AT 0840: PREVIOUSLY REPORTED AS Blood    Plasma   CMVLOG  Not Calculated  <2.1  <2.1  2.5*  3.3*       CMV viral loads    Log IU/mL of CMVQNT   Date Value Ref Range Status   08/17/2017 Not Calculated <2.1 [Log_IU]/mL Final   08/10/2017 <2.1 <2.1 [Log_IU]/mL Final   08/03/2017 <2.1 <2.1 [Log_IU]/mL Final   07/27/2017 2.5 (H) <2.1 [Log_IU]/mL Final   07/20/2017 3.3 (H) <2.1 [Log_IU]/mL Final   07/18/2017 3.0 (H) <2.1 [Log_IU]/mL  Final   07/15/2017 3.6 (H) <2.1 [Log_IU]/mL Final   07/10/2017 2.2 (H) <2.1 [Log_IU]/mL Final   07/03/2017 <2.1 <2.1 [Log_IU]/mL Final   06/26/2017 Not Calculated <2.1 [Log_IU]/mL Final       CMV resistance testing  Recent Labs   Lab Test  07/27/17   1109   CMVCID  Canceled, Test credited   Unsatisfactory specimen   Quantity not sufficient   Notification of test cancellation was given to  Naseem Kemp.7/28/17 at 1408.TV.     CMVFOS  Canceled, Test credited   Unsatisfactory specimen   Quantity not sufficient   Notification of test cancellation was given to  Naseem Kemp.7/28/17 at 1408.TV.     CMVGAN  Canceled, Test credited   Unsatisfactory specimen   Quantity not sufficient   Notification of test cancellation was given to  Naseem Kemp.7/28/17 at 1408.TV.       CMV Cidofovir Resist   Date Value Ref Range Status   07/27/2017   Final    Canceled, Test credited   Unsatisfactory specimen   Quantity not sufficient   Notification of test cancellation was given to  Naseem Kemp.7/28/17 at 1408.TV.       CMV Foscarnet Resist   Date Value Ref Range Status   07/27/2017   Final    Canceled, Test credited   Unsatisfactory specimen   Quantity not sufficient   Notification of test cancellation was given to  Naseem Kemp.7/28/17 at 1408.TV.       CMV Ganciclovir Resis   Date Value Ref Range Status   07/27/2017   Final    Canceled, Test credited   Unsatisfactory specimen   Quantity not sufficient   Notification of test cancellation was given to  Naseem Kemp.7/28/17 at 1408.TV.         USCNS Invalid input(s): USCN, USCNS    USCNS No components found for: USCN, USCNS      No results found for: H6RES    EBV DNA Copies/mL   Date Value Ref Range Status   08/15/2017 753 (A) EBVNEG^EBV DNA Not Detected [Copies]/mL Final   08/01/2017 6864 (A) EBVNEG [Copies]/mL Final   07/18/2017 462201 (A) EBVNEG [Copies]/mL Final         Pathology  Colon pathology after resection 7/26/2017   FINAL DIAGNOSIS:   A. COLON, TERMINAL ILEUM,  APPENDIX, RIGHT COLON, TRANSVERSE COLON, RIGHT   ART-COLECTOMY:   - Colonic wall with perforation, edema, and acute serositis   - Background colonic mucosa with no significant histologic abnormality   - CMV immunostain is positive in rare (3) cells   - Terminal ileum with no significant histologic abnormality   - Viable, unremarkable surgical margins   - One benign lymph node (0/1)   - Appendix with fibrous obliteration of the tip     B. OMENTUM, PARTIAL OMENTECTOMY:   - Fibroadipose tissue with acute and chronic inflammation, fat necrosis   Colon biopsies 7/21/2017   A: Colon biopsy, ascending   B: Colon biopsy, descending   FINAL DIAGNOSIS:   A. COLON BIOPSY, ASCENDING:   - Colonic mucosa with no significant histologic abnormality   - Negative for intraepithelial lymphocytosis or deposition of   subepithelial thick collagenous band   B. COLON BIOPSY, DESCENDING:   - Crypt architecture distortion, consistent with healed prior injury   - Negative for active inflammation or dysplasia   - Negative for intraepithelial lymphocytosis or deposition of   subepithelial thick collagenous band     Cytology of ascitic fluid 7/25/2017   PERITONEAL FLUID, ASCITES:   -Negative for malignancy   Cytology of ascitic fluid 7/14/2017.   Peritoneal fluid, ascites:   - Negative for malignancy   - Acute and chronic inflammation present   Specimen Adequacy: Satisfactory for evaluation.   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   INTERPRETATION:   Ascites fluid:        Rare to absent viable B cells     COMMENT:   This specimen was collected on 7/14/17 but was not ordered/delivered to   lab/run until 7/18/17 - results should be interpreted with caution due   to low cellularity/viability.     Due to limited cellularity we were only able to analyze the B cells.   There is no immunophenotypic evidence of B cell non-Hodgkin lymphoma.   Neoplastic cells, including large cells, may not survive specimen   processing. This sample may not be  representative. Final interpretation   requires correlation with morphologic and clinical features.       Imaging:  Right finger XR 8/19/2017 reviewed by myself   IMPRESSION: No erosive changes in the right index finger to suggest  osteomyelitis.     CT a/p 8/15/2017  IMPRESSION:   1. Contrast opacification of the mucous fistula which is contiguous  with the transverse and descending colon. No leak identified. Distal  sigmoid colon and rectum were not well opacified. Unchanged wall  thickening involving the descending colon and rectum.  2. Interval placement of anterior abdominal drainage catheter and  stable position of right retroperitoneal drainage catheter. Multiple  intra-abdominal fluid collections containing foci of gas, the largest  of which has decreased in size secondary to catheter placement.   3. Stable mild bilateral hydronephrosis. Bladder wall thickening may  be reactive or due to a urinary tract infection.  4. Stable bibasilar atelectasis and small pleural effusions right  greater than left.  5. Splenomegaly.  CT a/p 8/14/2017  IMPRESSION:   1a. Moderate volume ascites, stable with increased foci of  intraperitoneal gas, potentially relating to catheter flushes,  redistribution of retroperitoneal gas or infection though given recent  surgical procedures on the presence of two ostomies, perforation or  dehiscence is also a consideration.  1b. Thickened peritoneum concerning for peritonitis.  2. Complex right retroperitoneal gas containing collection is stable  to slightly decreased in size compared with 8/9/2017. Pigtail catheter  is in good position.   3. Stable loculated bibasilar pleural effusions and bibasilar  consolidative opacities which could represent infection or  atelectasis.  4. Postsurgical changes of right hemicolectomy. Several loops of  borderline dilated small bowel; however, oral contrast reaches the  ileostomy.  5. Stable left hydronephrosis.    CT c/a/p 7/25/2017 reviewed by  myself.   IMPRESSION:   1. New large heterogeneous area of right-sided retroperitoneal gas  which is highly concerning for an infectious process. Given the  history of prior neutropenic colitis and biopsies, there could also be  a component of stool from a ruptured viscus, despite the lack of  surrounding bowel loops and no extraluminal oral contrast. Surgical  consultation is recommended.   2. Bibasilar atelectasis versus consolidation with small bilateral  pleural effusions.  3. Extensive mesenteric and soft tissue edema with large volume  ascites. Numerous mesenteric and retroperitoneal lymph nodes which are  presumably reactive.  4. Postsurgical changes of liver transplant.  CT c/a/p7/13/2017 Reviewed by myself.   IMPRESSION:   1. Improved moderate right-sided pleural effusion and resolution of  left-sided pleural effusion. There remain large areas of  atelectasis/consolidation in both lungs, including in the left lower  lobe despite resolution of left-sided pleural effusion. Superimposed  infectious process cannot be excluded.  2. Moderate-large amount of ascites increased from 7/8/2017, which  makes it difficult to evaluate for the presence or absence of  inflammatory change or infectious process in the abdomen or pelvis. No  intra-abdominal abscess.  3. Stable small presumed hematoma within the ventral abdominal wall  fat.  4. Splenomegaly.    MRI brain 7/20/2017.   Impression:  1. Significant image degradation due to motion artifact, with no  definite acute intracranial pathology. No abnormal contrast enhancing  intracranial lesions.  2. Mucosal thickening in the sphenoid and maxillary sinuses and left  greater than right mastoid fluid are nonspecific in the setting of  recent intubation.    Naseem Figueroa  Pager: [phone]

## 2017-08-21 NOTE — PROGRESS NOTES
"[facility] Nurse Inpatient Adult Pressure INJURY (PI) Wound Assessment     Follow up assessment of PU(s) on pt's:  Ileostomy   Follow up assessment of PI on- left ear lobe and Sacrum      Data:   Patient History:      per MD note(s): This is a 51 year old male with complex PMH of CASTA[place]EDA cirrhosis s/p Liver transplant Mar 2017.  Was admitted on 2014 with abdominal pain, sepsis, CT at OSH showed \"right colonic wall thickening suggesting colitis\" underwent Ex-Lap and washout for neutropenic colitis, intra-op was noted to have inflammed cecum and ascending colon with murky fluid.  Abdomen was left open at the time and was closed on 2017.  Some concern for CMV colitis with low count CMV viremia/GI PTLD and subsequently underwent colonoscopy on :  No colitis was seen on visualized portions of mucosa.     Current mattress:  Pulsate mattress   Current pressure relieving devices: Foam dressing, pt able to turn independently but rolls on his back often due to hip pain. Per RN he has been complaining of pain on various part of his body.    Moisture Management:  Ileostomy, incontinent of bladder at times      Current Diet / Nutrition:       Active Diet Order      Regular Diet Adult Thin Liquids (water, ice chips, juice, milk, gelatin, ice cream, etc)      Kwasi Score  Av.5  Min: 10  Max: 22       Labs:     Recent Labs   Lab Test  17   0621   17   0711   17   0949   17   0316   ALBUMIN  1.8*   < >   --    < >   --    < >   --    HGB  8.1*   < >  7.7*   < >   --    < >  7.1*   INR   --    --    --    --   1.19*   < >  1.80*   WBC  3.4*   < >  8.8   < >   --    < >  0.8*   A1C   --    --    --    --    --    --   Canceled, Test credited   Below Assay Range  NOTIFIED LEONARD ONEILL AT 0538 ON 17 BY CHRISSY     CRP   --    --   190.0*   < >   --    < >   --     < > = values in this interval not displayed.                                                                         "                                              Pressure Injury Assessment  (location #1):   Sacrum  Wound History:   Pt was transferred from  to  on 7/25 now transferred back to  on 7/29. Had multiple procedures throughout the day on 7/25/17 per RN. Pt continues to be confused. He refuses to turn at times and rolls on his back right away due to c/o pain on his hips.        Pic taken- 7/26/17                                                  Pic taken-8/10            Pic taken- 8/17/17                                                8/21/17    8/3/17- Unable to take picture today as pt wanted to roll on his back due to pain on his ribs  8/7/17- Unable to take picture today as pt not able to tolerate side lying due to pain on his hips  8/14/17- Phone not available to take picture today      Wound Base: 20 % moist yellow fibrinous tissue and 80% visible follicular buds, deep dermis    Specific Dimensions (length x width x depth, in cm) :  2.3 x 2.2 x0.2 cm     Tunneling:  N/A    Undermining: N/A    Palpation of the wound bed:  Firm on bone    Slough appearance: None    Eschar appearance:  none    Periwound Skin: Healed moisture associated dermatitis now blanchable erythema    Color: pink    Temperature  normal     Drainage: Moderate serous         Odor: none    Pain:  Painful    Pressure Injury Assessment  (location #2):   Left ear lobe   Wound History:   Initially noted this injury during assessing the pt for sacral pressure injury. Now able to turn his head independently        Pic taken 7/26/17                                                                 Pic taken- 8/3/17    Wound Base: healed    Specific Dimensions (length x width x depth, in cm) :  healed    Tunneling:  N/A    Undermining: N/A    Palpation of the wound bed:  none    Slough appearance:  none    Eschar appearance:  none    Periwound Skin: intact     Color: pink    Temperature  normal     Drainage:  none         Odor: none    Pain:  none          Intervention:     Patient's chart evaluated.      Kwasi Interventions:  Current Kwasi Interventions and Care Plan reviewed and updated, appropriate at this time.    Wound was assessed.    Wound Care: was done: per POC mentioned below,    Orders  Written    Supplies  Reviewed    Discussed plan of care with Nurse           Assessment:     Pressure Injury (PI) located on Sacrum: 2 (stage was confirmed by another [facility] RN Saba)  Status: wound  Follow up assessment, Symptomatic, slowly healing    Left ear lobe- healed stage 2    Healing moisture associated dermatitis with fungal component on bilateral buttocks and inner thighs. Recommend to keep the catheter (condom catheter) to promote wound healing as pt continues to be confuse and his entire dressing has been wet previously.      New ileostomy and Mucus fistula, stable. His wife knows how to change his pouch, performed return demo last week and mentioned she feels comfortable now.         Plan:     Nursing to notify the Provider(s) and re-consult the [facility] Nurse if wound(s) deteriorate(s).  Plan of care for wound located on Sacrum every other day and PRN :   Cleanse the area with NS and pat dry.  Apply No sting barrier to periwound skin.  Apply triad paste lightly only on open areas.  Cover wound with Sacral Mepilex (#[id number])  Change dressing every other day and PRN.  Turn and reposition Q 2hrs.  Ensure pt has Adolph-cushion while sitting up in the chair.  Ensure pt is on pulsate mattress.   FYI- Only use Covidien pad no  pad.      BL buttocks and inner thigh BID-     Cleanse the area very gently with soft cloth.    Apply LEXI antifungal cream (comes from pharmacy)    With repeat application, do not scrub the paste, only remove soiled paste and reapply.    If complete removal of paste is necessary use baby oil/mineral oil and soft wash cloth.    WO Nurse will return: weekly on Tuesday  [facility]  WO Nurse Inpatient Ostomy Assessment       Follow up  "assessment of ostomy and needs for:  Ileostomy and Mucus fistula (Not assessed today, Nurses to changed the pouch twice a week from now, POC in place)      Data:   History:    This is a 51 year old male with complex PMH of CASTA[place]A cirrhosis s/p Liver transplant Mar 2017.  Was admitted on 7/4/2014 with abdominal pain, sepsis, CT at OSH showed \"right colonic wall thickening suggesting colitis\" underwent Ex-Lap and washout for neutropenic colitis, intra-op was noted to have inflammed cecum and ascending colon with murky fluid.  Abdomen was left open at the time and was closed on 7/5/2017.  Some concern for CMV colitis with low count CMV viremia/GI PTLD and subsequently underwent colonoscopy on 7/21:  No colitis was seen on visualized portions of mucosa.  Exam sub-optimal as colon filled with solid stool.  Random biopsies obtained.  On 7/25/2017 pt became more tachycardic, increasing O2 needs and altered, pt was intubated, hypotension responsive to IVFs, worsening kidney function.  Has not required pressors.  CT was obtained which showed a new large heterogeneous right sided retroperitoneal gas and air tracking along duodenum.  No contrast extravasation.  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  Despite conservative management with bowel rest and IV abx, pt continued to spike fevers.  Now s/p Exploratory laparotomy, right angelique-colectomy, end ileostomy, mucus fistula, partial omentectomy on 7/26.    Type of ostomy:  Ileostomy and Mucus fistula  Stoma assessment:   ? stoma:  11/4\" , viable, pink, pale, round, moist, and protruberant  ? A bridge in place?: No  ? Stent(s) in place?: Not applicable,   Mucocutaneous Junction (MCJ):  Intact   Peristomal skin:  Intact   Abdominal assessment: soft   ? N/G still in place?:  Yes   Output:  small, watery, green    I/O last 3 completed shifts:  In: 2510 [I.V.:550; Other:90; NG/GT:375]  Out: 1845 [Urine:900; Drains:270; Stool:675]     Current pouching system:  " [place], 57mm flat two piece, high output, cut to fit and flat and barrier ring with minimal melting around the cutting edges.   Pain:  Complaining of pain during cleansing  ? Is pt still on a PCA? No  Diet:       Active Diet Order      Regular Diet Adult Thin Liquids (water, ice chips, juice, milk, gelatin, ice cream, etc)            Intervention:     Patient's chart evaluated.      Assessments done today:  Stoma, peristomal skin, and learning needs  ? Participants: pt, his mother, and His wife Danica (ph no- [phone]). Pt continues to be confused and not able to participate.  ?  Educational intervention: method: His wife Danica performed return demo for pouch replacement using below mentioned supplies. She did a great job and feels confident to change the pouch.  Prepared for discharge by: No discharge preparations started         Assessment:     Stoma assessment: viable and healthy    Complications: None    Learning needs/ comprehension: Wife is attentive and willing to learn.    Is pt able to demonstrate: 1. how to empty their pouch?  No: Demonstrated to wife today  2. How to read and write down correct I and O's?   No: Demonstrated to wife today    Effectiveness of current pouching/ supply plan: > 72 hrs         Plan:     Plan:   ? Current pouching system: [place] 57 mm  2 pc flat tra high output pouch with barrier ring    Education:  ? Educational needs: practice with How to empty pouch, Removal of pouch, Preparation of new pouch, Cutting out or evaluating a pattern, Applying paste or rings, Applying appliance to abdomen, Peristomal skin care, Diet and hydration / fluid balance, Odor / flatus management and Lifestyle Adjustments    ? Continue to prepare for discharge:  Supplies ordered, Completed supply list, Registered for samples from , Prescriptions or note left on chart for MD to sign/complete, Prepared for discharge to home with homecare, Discussed making a [facility] Nurse outpatient  appointment upon discharge, Discussed when to follow up with a [facility] Nurse in the future and Discussed how/ where to order supplies   ? Do WO Nurse recommend home care?  Possible TCU   Plan for ileostomy and Mucus fistula: RN to change pouch twice a week. [facility] will initiate teaching once pt is stable.  Supplies Needed  [place] (2pc 57mm)  Wafer- (#[id number])  Pouch- (#[id number])  or  High output pouch - (#[id number]) depending upon output  Barrier ring- (#[id number])     Ileostomy care- Change pouch twice a week  1. Gather all supplies and remove old pouch gently.  2. Cleanse peristomal skin with w arm water and soft wash cloth or gauze and dry thoroughly.  3. Cut the wafer to fit to stoma or use given pattern, apply barrier ring around the cutting surface and apply centering the stoma.  4. Attach the pouch  5. Massage around it for better adherence.

## 2017-08-21 NOTE — PLAN OF CARE
Problem: Goal Outcome Summary  Goal: Goal Outcome Summary  Outcome: Improving  /82 (BP Location: Right arm)  Pulse 82  Temp 97.9  F (36.6  C) (Oral)  Resp 22  Ht 1.829 m (6')  Wt 99.8 kg (220 lb 1.6 oz)  SpO2 94%  BMI 29.85 kg/m2     Tmax 99.1, tylenol administered and rechecked for 98.7. OVSS on RA. , 126, and 109. Denies pain or nausea. Regular diet/thin liquids with kit counts; no appetite. TF through NJ at 65 mL/hr. Voiding in urinal with assistance/prompting, incontinent x1. Ileostomy intact. 2 R drains in place with small amount of serosanguineous drainage. Incision CDI, stapled. Up with assist of 2. Continue with POC and notify MD with changes or concerns.

## 2017-08-21 NOTE — PLAN OF CARE
Problem: Goal Outcome Summary  Goal: Goal Outcome Summary  OT7A: Cancel.  Pt in a lot of pain upon arrival, provided with different activity options for therapy pt declined all stating he would like to rest.

## 2017-08-21 NOTE — PROGRESS NOTES
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 Admitted 7/4/17 from [facility] for evaluation and management of sepsis secondary to colitis, taken to OR for initial exploratory laparotomy with findings of typhlitis in the right colon, wound left open with wound vac in place for reexploration and interval closure on 7/5/2017. Concern for CMV colitis with low count CMV viremia/GI PTLD and subsequently underwent colonoscopy on 7/21, random biopsies obtained.  On 7/25/2017 patient became septic with abdominal compartment syndrome. CT noted new large heterogeneous right sided retroperitoneal gas and air tracking along duodenum and underwent a exploratory laparotomy, right angelique-colectomy, end ileostomy, mucus fistula, partial omentectomy on 7/26 by colorectal surgery.  Antibiotics discontinued 8/3. 8/6 became septic with increased abd pain, confusion. 8/9 right 20Fr retroperitoneal drain placed. Additional peritoneal 12F pigtail drain placed 8/15 .    Graft Function: Good function. LFTs acceptable (checking every Monday, Thursday).   Immunosuppression Management:   CellCept discontinued (6/27/17) due to neutropenia.   Prograf goal ~5-6 due to sepsis.  Unable to obtain detectable level with suspension.  Level 4.4, increased last night.  Cardiorespiratory: Stable respiratory status, NLB on RA. Assist with aggressive pulmonary toilet. OOB to chair and ambulate as tolerated.   HTN:-175, Continue amlodipine 5mg suspension. Pt on daily Florinef  Hematology: Pancytopenia present on admission, persists. Continue to hold MMF.   Primary CMV infection  Anemia- Hemoglobin 7.6. Last blood transfusion on 8/18  Leukopenia-. WBC 1.7, ANC 0.8. 300 Neupogen on 8/20  GI:   -Neutropenic colitis on admission. Colonoscopy 7/21. Biopsy: resolving injury secondary to possible drug induced vs infectious.   -Bowel  perforation: 7/25 CT scan abd/pelvis-new large heterogeneous right sided retroperitoneal gas and air tracking along duodenum.  No contrast extravasation.  No intraperitoneal air noted. Colorectal surgery performed Ex lap, right angelique-colectomy, end ileostomy, mucus fistula, partial omentectomy on 7/26. Findings no neida perforation, phlegmon/inflammation right colon. Colon pathology suggested colon perforation, negative for malignancy; CMV stain positive.    -Retroperitoneal abscess/ascites peritonitis: CT A/P 8/9 showed a persistent gas/fluid collection RLQ, peritonitis, some free air but no evidence of contrast extravasation. 8/11: Diag. Para WBC 5,038, culture + clostridium (see below). Repeat CT scan abd/pelvis 8/14 - Complex gas-containing fluid collection in the right retroperitoneum decreased slightly in size. Moderate ascites, with intraperitoneal gas, peritonitis noted.  IR placed additional drain to abdomen with cloudy/turbid fluid. No leak identified on CT scan. Drains R lateral 20 Fr:105. R 12F pigtail:150 (continued decrease)  -Diet:  Regular diet with thin liquids + Nepro NJ feeds @65/hr.  Calorie counts.  -High output  ileostomy:  Goal ileostomy output ~ 1200 cc in 24 hrs.  Output 1.1L yesterday.  Loperamide 4mg QID/ Lomotil QID/Fiber BID.  Fluid/Electrolytes/Renal:   -Dehydration: Initiate biotene/good oral hygiene for dry mouth    -EJ: on admission, d/t ATN sec to sepsis. SCR improved 1.1   -Hypernatremia: resolved with free water.    -Hyperkalemia: K 5.2 after lasix yesterday.  Monitor.  Endocrine: DM type 2. Endocrine consulting for glycemic management. Lantus 45 units with SSI and carb coverage.  Infectious disease: Afebrile.  WBC 1.5. ID following.    -Retroperitoneal abscess: CT C/A/P 8/9 showed a persistent gas/fluid collection RLQ, peritonitis, some free air but no evidence of contrast extravasation.  IR placed RP drain, growing clostridium- on zosyn. Per ID, suggested transition to  Levofloxacin 750mg qday + Metronidazole 500mg TID 8/20.   -CMV viremia: Primary CMV infection.  (CMV R-D+) CMV colitis per pathology, peak serum 7/15 4225 IU/ml.   IV Ganciclovir (started 7/20). Follow CMV PCR weekly, now <137 x 3 weeks.  Last level 8/17. Decreased Ganciclovir to 5mg/kg, consider po valcyte if next week negative.  -EBV viremia:  Peak serum 406,697 copies/ml on 7/18.  Down to 6864 as of 8/1.  Check weekly, now improved to 753 (8/15).  -R/o SBP or abscess:  8/11 paracentesis.  WBC 5,038, Drain cultures + clostridium septicum (day 6, broth only).  Repeat 8/15, WBC 15,680, culture NGTD.    -Cellulitis/dry gangrene:  Right 1st finger, on linezolid 8/19- present.  Resolved issues:  -Severe sepsis: On admission, secondary to right colon phlegmon. Meropenem/daptomycin/micafungin tx x 10 days completed on 8/3. S/p right angelique-colectomy.  Path: CMV stain +, perforation of colon noted.   Neuro: Toxic metabolic encephalopathy secondary to infection, prolonged hospital stay.  Improving.   Vascular:  Evidence of microvascular injury in digits (toes) this is most likely due to injury while patient was on pressor therapy with sepsis. Erythema right 1st finger documented and marked. X ray showed no signs of osteomyelitis, linezolid started 8/19.  Activity: Deconditioned due to prolong hospital course. Daily PT/OT, encourage pt to be OOB to chair. Encourage pulmonary toilet.   Prophylaxis: DVT-Heparin SQ  Disposition: 7A.     Medical Decision Making: Medium    PILLO/Fellow/Resident Provider: Evette Pennington NP [id number]    Faculty: Drew Dalal MD     __________________________________________________________________  Transplant History:.  3/4/2017 DD OLT with Dr. Tran (Liver), Postoperative day: 170     Interval History:   Overnight events: No acute events overnight. Tolerating diet, describes a poor appetite. Reports doing well. No n/v complains. Good ostomy output.      ROS:   A 10-point review of systems was  negative except as noted above.    Meds:    tacrolimus  5 mg Oral BID IS     furosemide  20 mg Intravenous BID     insulin glargine  45 Units Subcutaneous Q24H     insulin aspart   Subcutaneous TID w/meals     linezolid  600 mg Intravenous Q12H     artificial saliva  15 mL Swish & Spit 4x Daily     heparin  5,000 Units Subcutaneous Q8H     insulin aspart  1-12 Units Subcutaneous Q4H     - MEDICATION INSTRUCTIONS -   Does not apply Once     ganciclovir (CYTOVENE) intermittent infusion  5 mg/kg Intravenous Q24H     fludrocortisone  0.1 mg Oral Daily     amLODIPine  5 mg Oral Daily     sodium chloride (PF)  10 mL Irrigation Q8H     fiber modular  1 packet Per Feeding Tube BID     sodium chloride (PF)  20 mL Irrigation Q6H     diphenoxylate-atropine  5 mL Oral 4x Daily     saccharomyces boulardii  250 mg Oral BID     sodium bicarbonate  1,300 mg Per NG tube TID     ascorbic acid  250 mg Oral Daily     miconazole with skin protectant   Topical BID     piperacillin-tazobactam  3.375 g Intravenous Q6H     loperamide  4 mg Oral or Feeding Tube 4x Daily     protein modular  1 packet Per Feeding Tube TID     aspirin  80 mg Oral Daily     pantoprazole  40 mg Oral or Feeding Tube Daily     sodium chloride (PF)  3 mL Intracatheter Q8H       Physical Exam:     Admit Weight: 94.2 kg (207 lb 11.2 oz) (SCALE 2)    Current vitals:   BP (!) 174/94 (BP Location: Right arm)  Pulse 90  Temp 97.8  F (36.6  C) (Oral)  Resp 22  Ht 1.829 m (6')  Wt 99.8 kg (220 lb 1.6 oz)  SpO2 93%  BMI 29.85 kg/m2    Vital sign ranges:    Temp:  [97.4  F (36.3  C)-99.1  F (37.3  C)] 97.8  F (36.6  C)  Pulse:  [82-91] 90  Heart Rate:  [90-92] 91  Resp:  [20-25] 22  BP: (139-174)/(81-95) 174/94  SpO2:  [92 %-98 %] 93 %  Patient Vitals for the past 24 hrs:   BP Temp Temp src Pulse Heart Rate Resp SpO2   08/21/17 1117 (!) 174/94 97.8  F (36.6  C) Oral 90 - 22 93 %   08/21/17 0744 146/88 97.8  F (36.6  C) Oral 88 - 22 92 %   08/21/17 0321 148/82 97.9  F  (36.6  C) Oral 82 - 22 94 %   08/21/17 0000 147/85 97.8  F (36.6  C) Oral 89 - 20 97 %   08/20/17 2039 - 99.1  F (37.3  C) Axillary - - - -   08/20/17 2020 145/81 98.2  F (36.8  C) Oral 91 91 25 94 %   08/20/17 1529 (!) 139/93 97.4  F (36.3  C) Oral 90 90 25 98 %   08/20/17 1225 (!) 166/95 97.5  F (36.4  C) Oral - 92 20 94 %     General Appearance: NAD, laying in bed  Skin: normal, warm, dry  Heart: RRR  HEENT: dry oral cavity/chapped lips, no oral ulcerations, no Thrush  Lungs: nonlabored respirations on RA  Abdomen: soft, rounded, minimally tender. Ileostomy with brown output. Mucus fistula covered. Midline incision, c/d/i.  Right abdominal Drains: 20F-s/s, 12F -bilious/purulent  : Incontinent  Extremities: No edema. R index finger with necrotic blacked finger with surrounding erythema and one vesicle noted.  Necrotic blackened areas on right 1st & 2nd toes and left 2nd toe.  Neurologic: A&O x 3, speech appropriate      Data:   CMP    Recent Labs  Lab 08/21/17  0649 08/20/17  2143 08/20/17  1640 08/20/17  0639  08/18/17  0538  08/16/17  1100 08/16/17  0650     --  141 142  < > 143  < >  --  142   POTASSIUM 5.2 5.1 5.5* 5.4*  < > 4.5  < >  --  4.0   CHLORIDE 113*  --  115* 114*  < > 116*  < >  --  117*   CO2 23  --  21 22  < > 21  < >  --  16*   *  --  218* 115*  < > 127*  < >  --  129*   BUN 48*  --  48* 45*  < > 56*  < >  --  65*   CR 1.00  --  0.98 0.93  < > 1.13  < >  --  1.50*   GFRESTIMATED 78  --  80 85  < > 68  < >  --  49*   GFRESTBLACK >90  --  >90 >90  < > 82  < >  --  59*   JACOB 8.6  --  8.1* 8.5  < > 8.0*  < >  --  8.3*   MAG 1.9  --   --  1.7  < > 1.7  < >  --  1.8   PHOS 3.4  --  3.9 3.7  < > 3.4  < >  --  4.4   LIPASE  --   --   --   --   --   --   --   --  55*   ALBUMIN 1.6*  --  1.6*  --   --  1.7*  < >  --   --    BILITOTAL 0.4  --   --   --   --  0.5  < >  --   --    ALKPHOS 245*  --   --   --   --  236*  < >  --   --    AST 39  --   --   --   --  32  < >  --   --    ALT 24  --    "--   --   --  21  < >  --   --    FAMY  --   --   --   --   --   --   --  10  --    FLIPA  --   --   --   --   --   --   --  34  --    < > = values in this interval not displayed.  CBC    Recent Labs  Lab 08/21/17  0649 08/20/17  0639   HGB 7.6* 8.1*   WBC 1.7* 1.5*   PLT 95* 111*     COAGS  No lab results found in last 7 days.    Invalid input(s): XA   Urinalysis  Recent Labs   Lab Test  08/10/17   1752  08/08/17   1600   06/14/17   1508   04/11/16   1345   COLOR  Yellow  Yellow   < >   --    < >  Yellow   APPEARANCE  Slightly Cloudy  Slightly Cloudy   < >   --    < >  Clear   URINEGLC  Negative  Negative   < >   --    < >  30*   URINEBILI  Small   This is an unconfirmed screening test result. A positive result may be false.  *  Negative   < >   --    < >  Negative   URINEKETONE  Negative  Negative   < >   --    < >  Negative   SG  1.012  1.010   < >   --    < >  1.016   UBLD  Negative  Negative   < >   --    < >  Small*   URINEPH  5.0  5.0   < >   --    < >  5.0   PROTEIN  30*  30*   < >   --    < >  30*   NITRITE  Negative  Negative   < >   --    < >  Negative   LEUKEST  Negative  Negative   < >   --    < >  Negative   RBCU  0  3*   < >   --    < >  1   WBCU  10*  7*   < >   --    < >  1   UTPG   --    --    --   1.55*   --   0.41*    < > = values in this interval not displayed.          7/21/2017   SPECIMEN(S):   A: Colon biopsy, ascending   B: Colon biopsy, descending     FINAL DIAGNOSIS:   A. COLON BIOPSY, ASCENDING:   - Colonic mucosa with no significant histologic abnormality   - Negative for intraepithelial lymphocytosis or deposition of   subepithelial thick collagenous band     B. COLON BIOPSY, DESCENDING:   - Crypt architecture distortion, consistent with healed prior injury   - Negative for active inflammation or dysplasia   - Negative for intraepithelial lymphocytosis or deposition of   subepithelial thick collagenous band   - Please, see comment     COMMENT:   Specimen B, \"descending colon\" features " lamina propria edema, slight   variation in crypt sizes and shapes and occasional crypt drop-out.  This   may indicate a resolving injury which could be drug-induced or   infectious.

## 2017-08-21 NOTE — PROGRESS NOTES
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  Now s/p ex lap with R hemicolectomy, end ileostomy, mucus fistula, partial omenectomy on 7/26.      Glucose trending mostly in target. Highest in the afternoon, maybe related to PO intake versus need to add NPH again.  Plan:    -Glargine 45 units q24h  -meal aspart 1unit/6g CHO ordered for meals and snacks  -correction aspart: high intensity g1dtrig  -monitor glucose q4h.    Will continue to follow  Please notify diabetes team of changes planned for nutrition support schedule.     Outpatient diabetes follow up: Dr. Eckert at [facility]                   Interval History:   8/7/17 Pt upset about inpatient diabetes mgmt consult.  Transplant contacted pt's outpatient endocrinologist Dr. Eckert of [facility]-- no issue with inpatient team managing pt's glucose    Continues on Nepro at 65ml/h.  Ate half Pashto toast, 2 milks + this morning.  Says appetite is poor.  Complained of need to stool-- RN explaining ostomy.    Pt dispo likely TCU.      Recent Labs  Lab 08/21/17  1159 08/21/17  0649 08/21/17  0318 08/20/17  2356 08/20/17  2023 08/20/17  1640 08/20/17  1532 08/20/17  1225  08/20/17  0639  08/19/17  0630  08/18/17  0538  08/17/17  0632   GLC  --  129*  --   --   --  218*  --   --   --  115*  --  165*  --  127*  --  128*   *  --  109* 126* 155*  --  210* 162*  < >  --   < >  --   < >  --   < >  --    < > = values in this interval not displayed.            Review of Systems:   See interval hx          Medications:   PTA taking Januvia and glargine versus glargine and aspart per carb    Active  Diet Order      Regular Diet Adult Thin Liquids (water, ice chips, juice, milk, gelatin, ice cream, etc)     Physical Exam:  Gen: alert, resting in bed, NAD.  HEENT: mucous membranes moist, NJ feeding tube in place  Resp: normal, on RA  Neuro: intermittently confused    BP (!) 174/94 (BP Location: Right arm)  Pulse 90  Temp 97.8  F (36.6  C) (Oral)  Resp 22  Ht 1.829 m (6')  Wt 99.8 kg (220 lb 1.6 oz)  SpO2 93%  BMI 29.85 kg/m2           Data:     Lab Results   Component Value Date    A1C  07/06/2017     Canceled, Test credited   Below Assay Range  NOTIFIED LEONARD ONEILL AT 0538 ON 7/6/17 BY CHRISSY      A1C 9.4 02/16/2017    A1C 6.2 12/14/2016    A1C 6.1 10/27/2016    A1C 8.3 07/02/2012            Recent Labs   Lab Test  08/21/17   0649  08/20/17   2143  08/20/17   1640   NA  141   --   141   POTASSIUM  5.2  5.1  5.5*   CHLORIDE  113*   --   115*   CO2  23   --   21   ANIONGAP  5   --   6   GLC  129*   --   218*   BUN  48*   --   48*   CR  1.00   --   0.98   JACOB  8.6   --   8.1*     CBC RESULTS:   Recent Labs   Lab Test  08/21/17   0649   WBC  1.7*   RBC  2.55*   HGB  7.6*   HCT  23.5*   MCV  92   MCH  29.8   MCHC  32.3   RDW  18.1*   PLT  95*     Janicehomer Guzman APRN [phone]    Diabetes Management job code [id number]

## 2017-08-21 NOTE — PROGRESS NOTES
Calorie Counts    Intake Recorded For: 8/20 Kcals: 120 Protein: 0g    # Meals Recorded: 1 meal (100% of 8 oz orange juice)    # Supplements Recorded: 0

## 2017-08-21 NOTE — PLAN OF CARE
Problem: Goal Outcome Summary  Goal: Goal Outcome Summary  PT/7A: Pt progressing slowly with PT. Pt continues to need max encouragement to participate. Pt transfers supine<>sit with CGA and VC for proper body mechanics. Pt transfers sit<>stand with CGA and FWW and amb in bishop for 50 ft x3 with standing rest breakst d/t fatigue and BLE weakness. Pt demonstrates L knee buckling with amb and needs close assist to prevent fall.        D/C to TCU once medically cleared.

## 2017-08-22 ENCOUNTER — APPOINTMENT (OUTPATIENT)
Dept: PHYSICAL THERAPY | Facility: CLINIC | Age: 52
End: 2017-08-22
Attending: TRANSPLANT SURGERY
Payer: COMMERCIAL

## 2017-08-22 LAB
ANION GAP SERPL CALCULATED.3IONS-SCNC: 5 MMOL/L (ref 3–14)
ANISOCYTOSIS BLD QL SMEAR: ABNORMAL
BACTERIA SPEC CULT: NORMAL
BASOPHILS # BLD AUTO: 0 10E9/L (ref 0–0.2)
BASOPHILS NFR BLD AUTO: 0 %
BUN SERPL-MCNC: 45 MG/DL (ref 7–30)
CALCIUM SERPL-MCNC: 8.2 MG/DL (ref 8.5–10.1)
CHLORIDE SERPL-SCNC: 111 MMOL/L (ref 94–109)
CO2 SERPL-SCNC: 24 MMOL/L (ref 20–32)
CREAT SERPL-MCNC: 0.98 MG/DL (ref 0.66–1.25)
DIFFERENTIAL METHOD BLD: ABNORMAL
EOSINOPHIL # BLD AUTO: 0 10E9/L (ref 0–0.7)
EOSINOPHIL NFR BLD AUTO: 0 %
ERYTHROCYTE [DISTWIDTH] IN BLOOD BY AUTOMATED COUNT: 18.3 % (ref 10–15)
FUNGUS SPEC CULT: NORMAL
GFR SERPL CREATININE-BSD FRML MDRD: 80 ML/MIN/1.7M2
GLUCOSE BLDC GLUCOMTR-MCNC: 116 MG/DL (ref 70–99)
GLUCOSE BLDC GLUCOMTR-MCNC: 143 MG/DL (ref 70–99)
GLUCOSE BLDC GLUCOMTR-MCNC: 160 MG/DL (ref 70–99)
GLUCOSE BLDC GLUCOMTR-MCNC: 231 MG/DL (ref 70–99)
GLUCOSE BLDC GLUCOMTR-MCNC: 75 MG/DL (ref 70–99)
GLUCOSE BLDC GLUCOMTR-MCNC: 97 MG/DL (ref 70–99)
GLUCOSE SERPL-MCNC: 200 MG/DL (ref 70–99)
HCT VFR BLD AUTO: 23 % (ref 40–53)
HGB BLD-MCNC: 7.2 G/DL (ref 13.3–17.7)
HGB BLD-MCNC: 7.9 G/DL (ref 13.3–17.7)
LACTATE BLD-SCNC: 0.8 MMOL/L (ref 0.7–2)
LYMPHOCYTES # BLD AUTO: 0.6 10E9/L (ref 0.8–5.3)
LYMPHOCYTES NFR BLD AUTO: 42.6 %
Lab: NORMAL
MAGNESIUM SERPL-MCNC: 2 MG/DL (ref 1.6–2.3)
MCH RBC QN AUTO: 28.9 PG (ref 26.5–33)
MCHC RBC AUTO-ENTMCNC: 31.3 G/DL (ref 31.5–36.5)
MCV RBC AUTO: 92 FL (ref 78–100)
MONOCYTES # BLD AUTO: 0.1 10E9/L (ref 0–1.3)
MONOCYTES NFR BLD AUTO: 11.3 %
NEUTROPHILS # BLD AUTO: 0.6 10E9/L (ref 1.6–8.3)
NEUTROPHILS NFR BLD AUTO: 46.1 %
PHOSPHATE SERPL-MCNC: 3.7 MG/DL (ref 2.5–4.5)
PLATELET # BLD AUTO: 96 10E9/L (ref 150–450)
PLATELET # BLD EST: ABNORMAL 10*3/UL
POTASSIUM SERPL-SCNC: 5.1 MMOL/L (ref 3.4–5.3)
RBC # BLD AUTO: 2.49 10E12/L (ref 4.4–5.9)
SODIUM SERPL-SCNC: 140 MMOL/L (ref 133–144)
SPECIMEN SOURCE: NORMAL
SPECIMEN SOURCE: NORMAL
TACROLIMUS BLD-MCNC: 3.9 UG/L (ref 5–15)
TME LAST DOSE: ABNORMAL H
WBC # BLD AUTO: 1.3 10E9/L (ref 4–11)

## 2017-08-22 PROCEDURE — 83735 ASSAY OF MAGNESIUM: CPT | Performed by: STUDENT IN AN ORGANIZED HEALTH CARE EDUCATION/TRAINING PROGRAM

## 2017-08-22 PROCEDURE — 25000131 ZZH RX MED GY IP 250 OP 636 PS 637: Performed by: TRANSPLANT SURGERY

## 2017-08-22 PROCEDURE — 25000128 H RX IP 250 OP 636: Performed by: PHYSICIAN ASSISTANT

## 2017-08-22 PROCEDURE — 97116 GAIT TRAINING THERAPY: CPT | Mod: GP | Performed by: PHYSICAL THERAPIST

## 2017-08-22 PROCEDURE — 00000146 ZZHCL STATISTIC GLUCOSE BY METER IP

## 2017-08-22 PROCEDURE — 25000128 H RX IP 250 OP 636: Performed by: NURSE PRACTITIONER

## 2017-08-22 PROCEDURE — 25000132 ZZH RX MED GY IP 250 OP 250 PS 637: Performed by: TRANSPLANT SURGERY

## 2017-08-22 PROCEDURE — 25800025 ZZH RX 258: Performed by: CLINICAL NURSE SPECIALIST

## 2017-08-22 PROCEDURE — 25000128 H RX IP 250 OP 636: Performed by: TRANSPLANT SURGERY

## 2017-08-22 PROCEDURE — 25000132 ZZH RX MED GY IP 250 OP 250 PS 637: Performed by: SURGERY

## 2017-08-22 PROCEDURE — 27210432 ZZH NUTRITION PRODUCT RENAL BASIC LITER

## 2017-08-22 PROCEDURE — 25000125 ZZHC RX 250: Performed by: NURSE PRACTITIONER

## 2017-08-22 PROCEDURE — 80048 BASIC METABOLIC PNL TOTAL CA: CPT | Performed by: STUDENT IN AN ORGANIZED HEALTH CARE EDUCATION/TRAINING PROGRAM

## 2017-08-22 PROCEDURE — 25000132 ZZH RX MED GY IP 250 OP 250 PS 637: Performed by: NURSE PRACTITIONER

## 2017-08-22 PROCEDURE — 97110 THERAPEUTIC EXERCISES: CPT | Mod: GP | Performed by: PHYSICAL THERAPIST

## 2017-08-22 PROCEDURE — 25000132 ZZH RX MED GY IP 250 OP 250 PS 637: Performed by: COLON & RECTAL SURGERY

## 2017-08-22 PROCEDURE — 84100 ASSAY OF PHOSPHORUS: CPT | Performed by: STUDENT IN AN ORGANIZED HEALTH CARE EDUCATION/TRAINING PROGRAM

## 2017-08-22 PROCEDURE — 25000131 ZZH RX MED GY IP 250 OP 636 PS 637: Performed by: CLINICAL NURSE SPECIALIST

## 2017-08-22 PROCEDURE — 25000131 ZZH RX MED GY IP 250 OP 636 PS 637: Performed by: NURSE PRACTITIONER

## 2017-08-22 PROCEDURE — 83605 ASSAY OF LACTIC ACID: CPT | Performed by: TRANSPLANT SURGERY

## 2017-08-22 PROCEDURE — 36415 COLL VENOUS BLD VENIPUNCTURE: CPT | Performed by: STUDENT IN AN ORGANIZED HEALTH CARE EDUCATION/TRAINING PROGRAM

## 2017-08-22 PROCEDURE — 25000132 ZZH RX MED GY IP 250 OP 250 PS 637: Performed by: STUDENT IN AN ORGANIZED HEALTH CARE EDUCATION/TRAINING PROGRAM

## 2017-08-22 PROCEDURE — 27210913 ZZH NUTRITION PRODUCT INTERMEDIATE PACKET

## 2017-08-22 PROCEDURE — 25000132 ZZH RX MED GY IP 250 OP 250 PS 637: Performed by: PHYSICIAN ASSISTANT

## 2017-08-22 PROCEDURE — 97530 THERAPEUTIC ACTIVITIES: CPT | Mod: GP | Performed by: PHYSICAL THERAPIST

## 2017-08-22 PROCEDURE — 80197 ASSAY OF TACROLIMUS: CPT | Performed by: STUDENT IN AN ORGANIZED HEALTH CARE EDUCATION/TRAINING PROGRAM

## 2017-08-22 PROCEDURE — 85025 COMPLETE CBC W/AUTO DIFF WBC: CPT | Performed by: STUDENT IN AN ORGANIZED HEALTH CARE EDUCATION/TRAINING PROGRAM

## 2017-08-22 PROCEDURE — 85018 HEMOGLOBIN: CPT | Performed by: NURSE PRACTITIONER

## 2017-08-22 PROCEDURE — 40000193 ZZH STATISTIC PT WARD VISIT: Performed by: PHYSICAL THERAPIST

## 2017-08-22 PROCEDURE — 12000025 ZZH R&B TRANSPLANT INTERMEDIATE

## 2017-08-22 RX ORDER — AMOXICILLIN AND CLAVULANATE POTASSIUM 400; 57 MG/5ML; MG/5ML
875 POWDER, FOR SUSPENSION ORAL 2 TIMES DAILY
Status: DISCONTINUED | OUTPATIENT
Start: 2017-08-22 | End: 2017-08-27

## 2017-08-22 RX ORDER — AMOXICILLIN 250 MG
1-2 CAPSULE ORAL 2 TIMES DAILY
Status: ON HOLD | COMMUNITY
End: 2017-09-08

## 2017-08-22 RX ORDER — VALGANCICLOVIR HYDROCHLORIDE 50 MG/ML
900 POWDER, FOR SOLUTION ORAL DAILY
Status: COMPLETED | OUTPATIENT
Start: 2017-08-22 | End: 2017-08-26

## 2017-08-22 RX ORDER — OXYCODONE HCL 5 MG/5 ML
5 SOLUTION, ORAL ORAL 2 TIMES DAILY PRN
Status: DISCONTINUED | OUTPATIENT
Start: 2017-08-22 | End: 2017-08-27

## 2017-08-22 RX ORDER — AMINO ACIDS/PROTEIN HYDROLYS 11G-40/45
1 LIQUID IN PACKET (ML) ORAL DAILY
Status: DISCONTINUED | OUTPATIENT
Start: 2017-08-23 | End: 2017-08-29

## 2017-08-22 RX ADMIN — HEPARIN SODIUM 5000 UNITS: 5000 INJECTION, SOLUTION INTRAVENOUS; SUBCUTANEOUS at 22:48

## 2017-08-22 RX ADMIN — DOXYCYCLINE CALCIUM 100 MG: 50 SYRUP ORAL at 08:46

## 2017-08-22 RX ADMIN — INSULIN GLARGINE 45 UNITS: 100 INJECTION, SOLUTION SUBCUTANEOUS at 08:47

## 2017-08-22 RX ADMIN — PANTOPRAZOLE SODIUM 40 MG: 40 INJECTION, POWDER, FOR SOLUTION INTRAVENOUS at 20:01

## 2017-08-22 RX ADMIN — SODIUM BICARBONATE 650 MG TABLET 1300 MG: at 13:09

## 2017-08-22 RX ADMIN — Medication 1 PACKET: at 20:03

## 2017-08-22 RX ADMIN — AMOXICILLIN AND CLAVULANATE POTASSIUM 875 MG: 400; 57 POWDER, FOR SUSPENSION ORAL at 08:46

## 2017-08-22 RX ADMIN — VALGANCICLOVIR HYDROCHLORIDE 900 MG: 50 POWDER, FOR SOLUTION ORAL at 08:47

## 2017-08-22 RX ADMIN — DEXTROSE MONOHYDRATE: 100 INJECTION, SOLUTION INTRAVENOUS at 09:00

## 2017-08-22 RX ADMIN — FUROSEMIDE 20 MG: 10 INJECTION, SOLUTION INTRAVENOUS at 08:47

## 2017-08-22 RX ADMIN — Medication 15 ML: at 15:57

## 2017-08-22 RX ADMIN — MICONAZOLE NITRATE: 20 CREAM TOPICAL at 08:46

## 2017-08-22 RX ADMIN — HEPARIN SODIUM 5000 UNITS: 5000 INJECTION, SOLUTION INTRAVENOUS; SUBCUTANEOUS at 06:33

## 2017-08-22 RX ADMIN — AMLODIPINE BESYLATE 10 MG: 10 TABLET ORAL at 10:13

## 2017-08-22 RX ADMIN — DOXYCYCLINE CALCIUM 100 MG: 50 SYRUP ORAL at 20:01

## 2017-08-22 RX ADMIN — Medication 15 ML: at 08:47

## 2017-08-22 RX ADMIN — Medication 15 ML: at 12:21

## 2017-08-22 RX ADMIN — MICONAZOLE NITRATE: 20 CREAM TOPICAL at 20:01

## 2017-08-22 RX ADMIN — LOPERAMIDE HYDROCHLORIDE 4 MG: 2 SOLUTION ORAL at 15:57

## 2017-08-22 RX ADMIN — AMOXICILLIN AND CLAVULANATE POTASSIUM 875 MG: 400; 57 POWDER, FOR SUSPENSION ORAL at 20:01

## 2017-08-22 RX ADMIN — SODIUM BICARBONATE 650 MG TABLET 1300 MG: at 20:00

## 2017-08-22 RX ADMIN — LINEZOLID 600 MG: 600 INJECTION, SOLUTION INTRAVENOUS at 02:47

## 2017-08-22 RX ADMIN — PANTOPRAZOLE SODIUM 40 MG: 40 INJECTION, POWDER, FOR SOLUTION INTRAVENOUS at 10:13

## 2017-08-22 RX ADMIN — OXYCODONE HYDROCHLORIDE 5 MG: 5 SOLUTION ORAL at 08:46

## 2017-08-22 RX ADMIN — Medication 250 MG: at 08:46

## 2017-08-22 RX ADMIN — FILGRASTIM 480 MCG: 480 INJECTION, SOLUTION INTRAVENOUS; SUBCUTANEOUS at 20:00

## 2017-08-22 RX ADMIN — HEPARIN SODIUM 5000 UNITS: 5000 INJECTION, SOLUTION INTRAVENOUS; SUBCUTANEOUS at 13:09

## 2017-08-22 RX ADMIN — INSULIN HUMAN 8 UNITS: 100 INJECTION, SUSPENSION SUBCUTANEOUS at 18:11

## 2017-08-22 RX ADMIN — FUROSEMIDE 20 MG: 10 INJECTION, SOLUTION INTRAVENOUS at 15:57

## 2017-08-22 RX ADMIN — Medication 1 PACKET: at 08:46

## 2017-08-22 RX ADMIN — Medication 80 MG: at 08:46

## 2017-08-22 RX ADMIN — FLUDROCORTISONE ACETATE 0.1 MG: 0.1 TABLET ORAL at 08:47

## 2017-08-22 RX ADMIN — Medication 1 PACKET: at 08:48

## 2017-08-22 RX ADMIN — Medication 250 MG: at 20:00

## 2017-08-22 RX ADMIN — TACROLIMUS 5 MG: 5 CAPSULE ORAL at 08:47

## 2017-08-22 RX ADMIN — PIPERACILLIN AND TAZOBACTAM 3.38 G: 3; .375 INJECTION, POWDER, LYOPHILIZED, FOR SOLUTION INTRAVENOUS; PARENTERAL at 02:47

## 2017-08-22 RX ADMIN — TACROLIMUS 6 MG: 5 CAPSULE ORAL at 17:49

## 2017-08-22 RX ADMIN — SODIUM BICARBONATE 650 MG TABLET 1300 MG: at 08:47

## 2017-08-22 ASSESSMENT — PAIN DESCRIPTION - DESCRIPTORS: DESCRIPTORS: SORE

## 2017-08-22 NOTE — PLAN OF CARE
Problem: Goal Outcome Summary  Goal: Goal Outcome Summary  Outcome: No Change  [id number]: AVSS and afebrile. Pt did begin to desat, slight wheezing expiratory in right upper lung. Relisten later and did not hear it again. Oxygen prob on finger changed. Pt satting in the mid 90's on RA.  and 202. Incision stapled with no drainage. Ostomy output of 425. Drain connected to vazquez bag is leaking when it is irrigated with 20ml flushes. NJ clogged around 2030. Clog zapper used with no results. Used again at 2200. Dextrose 10ml hung at 65ml hr while tube feeds stopped. NJ flushing after second dose of clog zapper. Pt has a PICC and is on kit counts. Ambulated with PT around 1500. Pt is on a bed alarm and VPM. Will continue to monitor and follow plan of care.

## 2017-08-22 NOTE — PROGRESS NOTES
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 Admitted 7/4/17 from [facility] for evaluation and management of sepsis secondary to colitis, taken to OR for initial exploratory laparotomy with findings of typhlitis in the right colon, wound left open with wound vac in place for reexploration and interval closure on 7/5/2017. Concern for CMV colitis with low count CMV viremia/GI PTLD and subsequently underwent colonoscopy on 7/21, random biopsies obtained.  On 7/25/2017 patient became septic with abdominal compartment syndrome. CT noted new large heterogeneous right sided retroperitoneal gas and air tracking along duodenum and underwent a exploratory laparotomy, right angelique-colectomy, end ileostomy, mucus fistula, partial omentectomy on 7/26 by colorectal surgery.  Antibiotics discontinued 8/3. 8/6 became septic with increased abd pain, confusion. 8/9 right 20Fr retroperitoneal drain placed. Additional peritoneal 12F pigtail drain placed 8/15 .    Graft Function: Good function. LFTs acceptable (checking every M, Th).   Immunosuppression Management:   CellCept discontinued (6/27/17) due to neutropenia.   Prograf goal ~5-6 due to sepsis.  Level 3.9, increase dose today.  Cardiorespiratory:   -Suspected RONNIE:  Uses O2 overnight only for brief apnea while sleeping per nursing.  -HTN: SBP 140s-170s, increase amlodipine to 10mg suspension. Pt on daily Florinef for hyperkalemia.  Hematology: Pancytopenia present on admission, persists.  Contributing factors include medications and CMV infection.  -Anemia- Hemoglobin 7.2, recheck 7.9 this morning.  Last blood transfusion on 8/18.  -Leukopenia/neutropenia- WBC 1.3, ANC 0.6.  Received GCSF 8/20, repeat today.  -Thrombocytopenia-  Plt dropped with linezolid.  Monitor.  GI:   -Neutropenic colitis on admission. Colonoscopy 7/21. Biopsy: resolving injury secondary to  possible drug induced vs infectious.   -Bowel perforation: 7/25 CT scan abd/pelvis-new large heterogeneous right sided retroperitoneal gas and air tracking along duodenum.  No contrast extravasation.  No intraperitoneal air noted. Colorectal surgery performed Ex lap, right angelique-colectomy, end ileostomy, mucus fistula, partial omentectomy on 7/26. Findings no neida perforation, phlegmon/inflammation right colon. Colon pathology suggested colon perforation, negative for malignancy; CMV stain positive.    -Retroperitoneal abscess/ascites peritonitis: CT A/P 8/9 showed a persistent gas/fluid collection RLQ, peritonitis, some free air but no evidence of contrast extravasation. 8/11: Diag. Para WBC 5,038, culture + clostridium (see below).  8/14 CT scan abd/pelvis: Complex gas-containing fluid collection in the right retroperitoneum decreased slightly in size, moderate ascites with intraperitoneal gas, peritonitis noted, no bowel leak.  Antibiotic coverage as below.  -Diet:  Regular diet  + Nepro NJ feeds.  TF held briefly this morning due to concern over pink/orange stool color.  Color lessened later morning and hgb stable.  Restart TF with plan to cycle.  Calorie counts.  -High output  ileostomy:  Goal ileostomy output ~ 1200 cc in 24 hrs.  Loperamide 4mg QID/ Lomotil QID/Fiber BID.  Fluid/Electrolytes/Renal:   -EJ: on admission, d/t ATN sec to sepsis. Now resolved, Cr 1.  -Hypernatremia: resolved with free water.    -Hyperkalemia: K 5.1 on lasix and florinef.  Endocrine: DM type 2. Endocrine consulting for glycemic management. Notified of plan to cycle TF.  Infectious disease: Afebrile.  WBC 1.3. ID following.    -Retroperitoneal abscess/peritonitis: CT C/A/P 8/9 showed a persistent gas/fluid collection RLQ, peritonitis, some free air but no evidence of contrast extravasation.  8/11 paracentesis: WBC 5,038, drain cultures + clostridium septicum (day 6, broth only).  Repeat 8/15, WBC 15,680, culture NGTD.  On zosyn  (8/9-8/22), linezolid (8/9-8/15), switch to Augmentin today.  -CMV viremia: Primary CMV infection.  (CMV R-D+) CMV colitis per pathology, peak serum 7/15 4225 IU/ml.   IV Ganciclovir (started 7/20).  CMV count fell to <137. Decreased Ganciclovir to 5mg/kg on 8/18.  Switch to valcyte 900mg daily today, continue until 8/26.  Check CMV quant weekly until 9/24 and then every other week for 2 months.  -EBV viremia:  Peak serum 406,697 copies/ml on 7/18.   753 copies/ml on 8/15.  Check quant monthly, due 9/15.  -Cellulitis/dry gangrene:  Right 1st finger, on linezolid (8/19- 8/22), switch to doxycycline x7 days.  Clinically improved.  Resolved issues:  -Severe sepsis: On admission, secondary to right colon phlegmon. Meropenem/daptomycin/micafungin tx x 10 days completed on 8/3. S/p right angelique-colectomy.  Path: CMV stain +, perforation of colon noted.   Neuro: Toxic metabolic encephalopathy secondary to infection, prolonged hospital stay.  Generally improved.  Vascular:  Evidence of microvascular injury in digits (toes) this is most likely due to injury while patient was on pressor therapy with sepsis. Erythema right 1st finger documented and marked.   Activity: Deconditioned due to prolong hospital course. Daily PT/OT, encourage pt to be OOB to chair. Encourage pulmonary toilet.   Prophylaxis: DVT-Heparin SQ  Disposition: 7A.     Medical Decision Making: Medium    PILLO/Fellow/Resident Provider: Evette Pennington NP [id number]    Faculty: Drew Dalal MD     __________________________________________________________________  Interval History:   Overnight events:  Intermittent confusion overnight, clear this morning.  Stool has orange/pink tinge today.  Left side of abdomen tender.    ROS:   A 10-point review of systems was negative except as noted above.    Meds:    amoxicillin-clavulanate  875 mg Oral BID     valGANciclovir  900 mg Oral Daily     doxycycline  100 mg Oral BID     pantoprazole  40 mg Intravenous BID     filgrastim  (NEUPOGEN/GRANIX) intravenous  5 mcg/kg (Dosing Weight) Intravenous Daily at 8 pm     amLODIPine  10 mg Oral Daily     [START ON 8/23/2017] protein modular  1 packet Per Feeding Tube Daily     tacrolimus  6 mg Oral BID IS     furosemide  20 mg Intravenous BID     insulin glargine  45 Units Subcutaneous Q24H     insulin aspart   Subcutaneous TID w/meals     artificial saliva  15 mL Swish & Spit 4x Daily     heparin  5,000 Units Subcutaneous Q8H     insulin aspart  1-12 Units Subcutaneous Q4H     fludrocortisone  0.1 mg Oral Daily     sodium chloride (PF)  10 mL Irrigation Q8H     fiber modular  1 packet Per Feeding Tube BID     sodium chloride (PF)  20 mL Irrigation Q6H     diphenoxylate-atropine  5 mL Oral 4x Daily     saccharomyces boulardii  250 mg Oral BID     sodium bicarbonate  1,300 mg Per NG tube TID     miconazole with skin protectant   Topical BID     loperamide  4 mg Oral or Feeding Tube 4x Daily     aspirin  80 mg Oral Daily     sodium chloride (PF)  3 mL Intracatheter Q8H       Physical Exam:     Admit Weight: 94.2 kg (207 lb 11.2 oz) (SCALE 2)    Current vitals:   /90 (BP Location: Left arm)  Pulse 94  Temp 98.3  F (36.8  C) (Oral)  Resp 22  Ht 1.829 m (6')  Wt 102.1 kg (225 lb 1.6 oz)  SpO2 95%  BMI 30.53 kg/m2    Vital sign ranges:    Temp:  [97.9  F (36.6  C)-98.3  F (36.8  C)] 98.3  F (36.8  C)  Pulse:  [94] 94  Heart Rate:  [89-92] 89  Resp:  [16-22] 22  BP: (145-160)/(84-96) 160/90  SpO2:  [92 %-97 %] 95 %  Patient Vitals for the past 24 hrs:   BP Temp Temp src Pulse Heart Rate Resp SpO2 Weight   08/22/17 1100 - - - - - - - 102.1 kg (225 lb 1.6 oz)   08/22/17 0815 160/90 98.3  F (36.8  C) Oral - 89 22 95 % -   08/22/17 0434 (!) 158/96 97.9  F (36.6  C) Oral - 91 20 97 % -   08/22/17 0019 158/90 97.9  F (36.6  C) Oral - 92 20 93 % -   08/21/17 2040 156/84 97.9  F (36.6  C) Oral 94 - 16 92 % -   08/21/17 1527 145/88 97.9  F (36.6  C) Oral - 90 22 94 % -     General Appearance: NAD,  laying in bed  Skin: normal, warm, dry  Heart: RRR  Lungs: nonlabored respirations, uses o2 at night  Abdomen: soft, rounded, minimally tender. Ileostomy with brown/orange output. Mucus fistula covered. Midline incision, c/d/i.  Right abdominal Drains: both serosang  : Incontinent  Extremities: No edema. R index finger with necrotic blacked finger with surrounding erythema, improved today.  Necrotic blackened areas on right 1st & 2nd toes and left 2nd toe.  Neurologic: A&O x 2-4, speech appropriate      Data:   CMP    Recent Labs  Lab 08/22/17  0640 08/21/17  0649  08/20/17  1640  08/18/17  0538  08/16/17  1100 08/16/17  0650    141  --  141  < > 143  < >  --  142   POTASSIUM 5.1 5.2  < > 5.5*  < > 4.5  < >  --  4.0   CHLORIDE 111* 113*  --  115*  < > 116*  < >  --  117*   CO2 24 23  --  21  < > 21  < >  --  16*   * 129*  --  218*  < > 127*  < >  --  129*   BUN 45* 48*  --  48*  < > 56*  < >  --  65*   CR 0.98 1.00  --  0.98  < > 1.13  < >  --  1.50*   GFRESTIMATED 80 78  --  80  < > 68  < >  --  49*   GFRESTBLACK >90 >90  --  >90  < > 82  < >  --  59*   JACOB 8.2* 8.6  --  8.1*  < > 8.0*  < >  --  8.3*   MAG 2.0 1.9  --   --   < > 1.7  < >  --  1.8   PHOS 3.7 3.4  --  3.9  < > 3.4  < >  --  4.4   LIPASE  --   --   --   --   --   --   --   --  55*   ALBUMIN  --  1.6*  --  1.6*  --  1.7*  < >  --   --    BILITOTAL  --  0.4  --   --   --  0.5  < >  --   --    ALKPHOS  --  245*  --   --   --  236*  < >  --   --    AST  --  39  --   --   --  32  < >  --   --    ALT  --  24  --   --   --  21  < >  --   --    FAMY  --   --   --   --   --   --   --  10  --    FLIPA  --   --   --   --   --   --   --  34  --    < > = values in this interval not displayed.  CBC    Recent Labs  Lab 08/22/17  1044 08/22/17  0640 08/21/17  0649   HGB 7.9* 7.2* 7.6*   WBC  --  1.3* 1.7*   PLT  --  96* 95*     COAGS  No lab results found in last 7 days.    Invalid input(s): XA   Urinalysis  Recent Labs   Lab Test  08/10/17   7854   08/08/17   1600   06/14/17   1508   04/11/16   1345   COLOR  Yellow  Yellow   < >   --    < >  Yellow   APPEARANCE  Slightly Cloudy  Slightly Cloudy   < >   --    < >  Clear   URINEGLC  Negative  Negative   < >   --    < >  30*   URINEBILI  Small   This is an unconfirmed screening test result. A positive result may be false.  *  Negative   < >   --    < >  Negative   URINEKETONE  Negative  Negative   < >   --    < >  Negative   SG  1.012  1.010   < >   --    < >  1.016   UBLD  Negative  Negative   < >   --    < >  Small*   URINEPH  5.0  5.0   < >   --    < >  5.0   PROTEIN  30*  30*   < >   --    < >  30*   NITRITE  Negative  Negative   < >   --    < >  Negative   LEUKEST  Negative  Negative   < >   --    < >  Negative   RBCU  0  3*   < >   --    < >  1   WBCU  10*  7*   < >   --    < >  1   UTPG   --    --    --   1.55*   --   0.41*    < > = values in this interval not displayed.

## 2017-08-22 NOTE — PLAN OF CARE
Problem: Goal Outcome Summary  Goal: Goal Outcome Summary  OT7A: Cancel.  Pt sleeping at time of arrival, at first wanted to attempt therapy, but kept falling asleep and requested to reschedule for tomorrow.

## 2017-08-22 NOTE — PROGRESS NOTES
CLINICAL NUTRITION SERVICES - REASSESSMENT NOTE     Nutrition Prescription      Malnutrition Status:    Severe malnutrition in the context of acute on chronic illness    Recommendations already ordered by Registered Dietitian (RD):  Continue oral supplements as ordered    Continue calorie counts (8/22 through 8/28)    Decrease Prosource TF to 1 pkt/day    Reduce continuous TF rate to 50 ml/hr until 6 pm d/t insulin provided in the morning.  Then start new new cycled regimen -> recommend Nepro @ 75 ml/hr x 14 hours (6pm-8am)+ 1 pkt Prosource TF + 2 pkts Nutrisource Fiber.  New goal regimen will provide - 1930 kcal (23 kcal/kg), 96 grams protein (1.1g/kg), 169 grams CHO (12 g CHO/hr), 19 grams fiber daily.        EVALUATION OF THE PROGRESS TOWARD GOALS   Diet: NPO   Nutrition Support: Nepro @ 65 ml/hr which provides 2808 kcal (32 kcal/kg), 126 grams protein (1.4g/kg), 1139 ml free water, 251 grams CHO and 20 grams fiber  -H2O Flushes: 30 ml q 4 hours (patency)  -Prosource 1 packet TID (3 packets = 120 kcals and 33 g PRO)  -Nutrisource Fiber 1 packet BID (6 g soluble fiber)    Has received 7 day TF average of 2261 kcal (85% needs), 128 grams protein (95% needs) daily, this includes TF modulars.  Combination of PO intake and TF meeting 100% of needs (36 kcal/kg, 1.9g/kg protein).    Calorie count average (4d, 8/18-8/21): 905 kcal, 40 grams of protein (30-35% of needs).  Patient drinking supplements and fluids (milk, juice) well.  Enjoys all of the supplement flavors.  Has an empty Ensure Clear at his bedside. Doesn't have much of an appetite for solid foods.        NEW FINDINGS     WOC nurse note on 8/21 indicates sacral wound (stage 2 ) is slowly healing and ear lobe wound is healed. -> Received 10 doses of wound healing vitamins from 8/9-8/19: KIP Plus 5 ml, zinc 220 mg, vit C 250 mg daily through 8/19.      Ileostomy output: Improving (between 500-1000 mL),  currently on Florastor BID, nutrisource fiber BID, lomotil  5 mL QID, imodium 4 mg QID.    Lowest body weight of 85.8 kg (8/13) on 8/13/17.  Current weight up ~20# from this weight.      MALNUTRITION  % Intake: No decreased intake noted  % Weight Loss: > 5% in 1 month (severe)  Subcutaneous Fat Loss: Facial region and Thoracic/intercostal: Moderate   Muscle Loss: Temporal, Facial & jaw region, Thoracic region (clavicle, acromium bone, deltoid, trapezius, pectoral), and Upper leg (quadricep, hamstring): moderate to severe  Fluid Accumulation/Edema: Does not meet criteria  Malnutrition Diagnosis: Severe malnutrition in the context of acute on chronic illness    Previous Goals   Total avg nutritional intake to meet a minimum of 25 kcal/kg and 1.5 g PRO/kg daily (per dosing wt 92 kg).  Evaluation: MET    Previous Nutrition Diagnosis  Inadequate enteral nutrition infusion  Evaluation: Improving    CURRENT NUTRITION DIAGNOSIS  Inadequate oral intake related to decreased appetite, fullness AEB patient consuming ~30-35% of assessed needs per calorie counts.     INTERVENTIONS  Implementation  Discussed oral intake and recommendations with patient.    Collaborated with primary and Endo team for TF changes (see above).     Goals  Total avg nutritional intake to meet a minimum of 30 kcal/kg and 1.5 g PRO/kg daily (per dosing wt 86 kg).    Monitoring/Evaluation  Progress toward goals will be monitored and evaluated per protocol.      Janice Salomon MS, RD, LD  Pager [phone]

## 2017-08-22 NOTE — PROGRESS NOTES
Calorie Counts  Intake recorded for: 8/21 Kcals: 1478  Protein: 56g  # Meals Recorded: 100% 1 orange juice, 2 1% milks, 50% Turkmen toast with syrup  # Supplements Recorded: 100% 2 Ensure Plus, 100% 1 Ensure Clear

## 2017-08-22 NOTE — PHARMACY-ADMISSION MEDICATION HISTORY
Admission medication history interview status for the 7/4/2017 admission is complete. See Epic admission navigator for allergy information, pharmacy, prior to admission medications and immunization status.      Medication history interview sources:  Patient, Mother, Home Medication List     Changes made to PTA medication list (reason)  Added: Senna-Docusate 1-2 BID  Deleted: Oxycodone (duplicate)  Changed: Novolog (appears per list that patient is also using SSI three times daily before meals (1-7 units) and nightly (1-5 units) in addition to carb correction, clotrimazole (TID), linagliptin (daily), lantus (using 45 units daily PTA)  - Immunosupression - left prescription entries as is, but according to list patient was taking 5mg BID of tacrolimus and 500mg BID of mycophenolate. Did not re-enter prescriptions as it is not clear when doses & prescriptions have been changed throughout the course of hospital stay and discharges post-transplant.      Additional medication history information (including reliability of information, actions taken by pharmacist): Mother knew medications and stated list is only accurate as to what he was taking before previous hospitalizations. List appeared to closely match the inpatient list that we had in Epic.               Prior to Admission medications    Medication Sig Last Dose Taking? Auth Provider   clotrimazole (MYCELEX) 10 MG LOZG lozenge Place 1 Beatrice inside cheek 3 times daily   Yes Unknown, Entered By History   senna-docusate (SENOKOT-S;PERICOLACE) 8.6-50 MG per tablet Take 1-2 tablets by mouth 2 times daily   Yes Unknown, Entered By History   insulin aspart (NOVOLOG FLEXPEN) 100 UNIT/ML injection Inject 1-7 Units Subcutaneous 3 times daily (with meals) Do not give if BG<140   Yes Unknown, Entered By History   insulin aspart (NOVOLOG FLEXPEN) 100 UNIT/ML injection Inject 1-5 Units Subcutaneous At Bedtime   Yes Unknown, Entered By History   gabapentin (NEURONTIN) 300 MG  capsule Take 1 capsule (300 mg) by mouth 3 times daily   Yes Toñito Camejo MD   amLODIPine (NORVASC) 5 MG tablet Take 1 tablet (5 mg) by mouth daily   Yes Toñito Camejo MD   tacrolimus (PROGRAF - GENERIC EQUIVALENT) 1 MG capsule Take 4capsules (4mg) in the morning and 3 capsules (3 mg) in the evening. Take every 12 hours.  Patient taking differently: 5 mg 2 times daily Take 4capsules (4mg) in the morning and 3 capsules (3 mg) in the evening. Take every 12 hours.   Yes Toñito Camejo MD   mycophenolate (CELLCEPT - GENERIC EQUIVALENT) 250 MG capsule Take 1 capsule (250 mg) by mouth every 12 hours  Patient taking differently: Take 500 mg by mouth every 12 hours    Yes Toñito Camejo MD   sulfamethoxazole-trimethoprim (BACTRIM/SEPTRA) 400-80 MG per tablet Take 1 tablet on Monday and take 1 tablet on Thursday   Yes Toñito Camejo MD   fludrocortisone (FLORINEF) 0.1 MG tablet Take 1 tablet (0.1 mg) by mouth daily For elevated potassium   Yes Toñito Camejo MD   Linagliptin (TRADJENTA PO) Take 5 mg by mouth daily    Yes Reported, Patient   tadalafil (CIALIS) 5 MG tablet Take 1 tablet (5 mg) by mouth daily Never use with nitroglycerin, terazosin or doxazosin.   Yes Toñito Rivas MD   cyclobenzaprine (FLEXERIL) 10 MG tablet Take 1 tablet (10 mg) by mouth 3 times daily as needed for muscle spasms   Yes Toñito Camejo MD   omeprazole (PRILOSEC) 20 MG CR capsule Take 1 capsule (20 mg) by mouth 2 times daily (before meals)   Yes Toñito Camejo MD   magnesium oxide (MAG-OX) 400 MG tablet Take 1 tablet (400 mg) by mouth 2 times daily   Yes Adonay Cross MD   lidocaine (LIDODERM) 5 % Patch Place 1 patch onto the skin every 24 hours Past Month at Unknown time Yes Dora Elizabeth NP   LACTOBACILLUS EXTRA STRENGTH CAPS Take 1 capsule by mouth 2 times daily   Yes Dora Elizabeth NP   prochlorperazine (COMPAZINE) 5 MG tablet Take 1-2 tablets (5-10 mg) by mouth every 6 hours as needed for nausea or vomiting Past Month  at Unknown time Yes Dora Elizabeth NP   oxyCODONE (ROXICODONE) 5 MG IR tablet Take 1-2 tablets (5-10 mg) by mouth every 4 hours as needed for moderate to severe pain   Yes Ada Driver PA-C   aspirin 325 MG tablet 1 tablet (325 mg) by Oral or Feeding Tube route daily   Yes Ada Driver PA-C   insulin aspart (NOVOLOG PEN) 100 UNIT/ML injection DOSE:  1 units per 8 grams of carbohydrate. With meals and snacks. Only chart total amount of units given.  Do not give if pre-prandial glucose is less than 60 mg/dL.   Yes Ada Driver PA-C   insulin glargine (LANTUS) 100 UNIT/ML injection Inject 45 Units Subcutaneous every 24 hours   Yes Ada Driver PA-C   valGANciclovir (VALCYTE) 450 MG tablet Take 1 tablet (450 mg) by mouth daily   Yes Ada Driver PA-C   multivitamin, therapeutic with minerals (THERA-VIT-M) TABS tablet Take 1 tablet by mouth daily   Yes Ada Driver PA-C   ondansetron (ZOFRAN-ODT) 4 MG ODT tab Take 1 tablet (4 mg) by mouth every 8 hours as needed for nausea   Yes Ada Driver PA-C   glucagon 1 MG SOLR injection Inject 1 mg Subcutaneous every 15 minutes as needed for low blood sugar (May repeat x 1 only)   Yes Godwin Willson MD            Medication history completed by: Aggie Sigala, PharmD

## 2017-08-22 NOTE — PROGRESS NOTES
Diabetes Consult Daily  Progress Note          Assessment/Plan:   Camacho Bhagat is a 52 year old man with type 2 diabetes, ESLD due to CASTAÑEDA s/p DD OLT 3/4/17, admitted 7/4/17 from [facility] ED for evaluation and management of sepsis secondary to colitis, s/p exploratory laparotomy with findings of typhlitis in the right colon and ongoing concern for CMV colitis.  Now s/p ex lap with R hemicolectomy, end ileostomy, mucus fistula, partial omenectomy on 7/26.  Protracted recovery with recent concerns for infection, bowel perforation.      Increased hyperglycemia overnight likely r/t sipping clear liquids.  Now eating well enough to change to cycle TFs.    Plan:  -okay to reduce TF 50 cc/h when restarted (at noon)  -Glargine 45 units given this morning (so TF to continue during day, as above). Reduce dose to 20 units tomorrow  - for TF rate increase to 75 cc/h this evening, start NPH 8 units.  Tomorrow, with less glargine on board, NPH 20 units for TF.  -meal aspart reduced to 1unit/10g CHO ordered for meals and snacks  -correction aspart: high intensity [id number]  -monitor glucose q4h.    Will continue to follow. Plan discussed with RN and RD.  Please notify diabetes team of changes planned for nutrition support schedule.     Outpatient diabetes follow up: Dr. Eckert at [facility]               Interval History:   8/7/17 Pt upset about inpatient diabetes mgmt consult.  Transplant contacted pt's outpatient endocrinologist Dr. Eckert of [facility]-- no issue with inpatient team managing pt's glucose    Nepro (65 cc/h) held and D10W infused-- there was tube clogging then concern about change of color in ostomy output.  TF back on at noon.  Given BG above target much of yesterday, okay to do reduced TF rate in prep for cycle rate tonight (Nepro 75 cc/h x 14h).  Pt had >1000 calories yesterday.  And RN notes sipping clears overnight.    When last on cycled TF late  July, was using NPH 40 units foir Nepro 15 cc/h.  And glargine was 20 units  Creatinine 1.88 around that time.      Recent Labs  Lab 08/22/17  1603 08/22/17  1158 08/22/17  0837 08/22/17  0640 08/22/17  0437 08/22/17  0032 08/21/17  2045  08/21/17  0649  08/20/17  1640  08/20/17  0639  08/19/17  0630  08/18/17  0538   GLC  --   --   --  200*  --   --   --   --  129*  --  218*  --  115*  --  165*  --  127*   BGM 75 116* 143*  --  231* 160* 202*  < >  --   < >  --   < >  --   < >  --   < >  --    < > = values in this interval not displayed.            Review of Systems:   See interval hx          Medications:   PTA taking Januvia and glargine versus glargine and aspart per carb    Active Diet Order      Regular Diet Adult     Physical Exam:  Gen: alert, resting in bed, NAD.  HEENT: mucous membranes moist, NJ feeding tube in place  Resp: normal, on RA  Neuro: intermittently confused    BP (!) 158/94 (BP Location: Left arm)  Pulse 94  Temp 97.6  F (36.4  C) (Oral)  Resp 20  Ht 1.829 m (6')  Wt 102.1 kg (225 lb 1.6 oz)  SpO2 92%  BMI 30.53 kg/m2           Data:     Lab Results   Component Value Date    A1C  07/06/2017     Canceled, Test credited   Below Assay Range  NOTIFIED LEONARD ONEILL AT 0538 ON 7/6/17 BY CHRISSY      A1C 9.4 02/16/2017    A1C 6.2 12/14/2016    A1C 6.1 10/27/2016    A1C 8.3 07/02/2012            Recent Labs   Lab Test  08/22/17   0640  08/21/17   0649   NA  140  141   POTASSIUM  5.1  5.2   CHLORIDE  111*  113*   CO2  24  23   ANIONGAP  5  5   GLC  200*  129*   BUN  45*  48*   CR  0.98  1.00   JACOB  8.2*  8.6     CBC RESULTS:   Recent Labs   Lab Test  08/22/17   1044  08/22/17   0640   WBC   --   1.3*   RBC   --   2.49*   HGB  7.9*  7.2*   HCT   --   23.0*   MCV   --   92   MCH   --   28.9   MCHC   --   31.3*   RDW   --   18.3*   PLT   --   96*     Janice Guzman APRN [facility] [phone]    Diabetes Management job code [id number]

## 2017-08-22 NOTE — PHARMACY-ANTICOAGULATION SERVICE
Admission medication history interview status for the 7/4/2017 admission is complete. See Epic admission navigator for allergy information, pharmacy, prior to admission medications and immunization status.     Medication history interview sources:  Patient, Mother, Home Medication List    Changes made to PTA medication list (reason)  Added: Senna-Docusate 1-2 BID  Deleted: Oxycodone (duplicate)  Changed: Novolog (appears per list that patient is also using SSI three times daily before meals (1-7 units) and nightly (1-5 units) in addition to carb correction, clotrimazole (TID), linagliptin (daily), lantus (using 45 units daily PTA)  - Immunosupression - left prescription entries as is, but according to list patient was taking 5mg BID of tacrolimus and 500mg BID of mycophenolate. Did not re-enter prescriptions as it is not clear when doses & prescriptions have been changed throughout the course of hospital stay and discharges post-transplant.     Additional medication history information (including reliability of information, actions taken by pharmacist): Mother knew medications and stated list is only accurate as to what he was taking before previous hospitalizations. List appeared to closely match the inpatient list that we had in [facility].      Prior to Admission medications    Medication Sig Last Dose Taking? Auth Provider   clotrimazole (MYCELEX) 10 MG LOZG lozenge Place 1 Beatrice inside cheek 3 times daily  Yes Unknown, Entered By History   senna-docusate (SENOKOT-S;PERICOLACE) 8.6-50 MG per tablet Take 1-2 tablets by mouth 2 times daily  Yes Unknown, Entered By History   insulin aspart (NOVOLOG FLEXPEN) 100 UNIT/ML injection Inject 1-7 Units Subcutaneous 3 times daily (with meals) Do not give if BG<140  Yes Unknown, Entered By History   insulin aspart (NOVOLOG FLEXPEN) 100 UNIT/ML injection Inject 1-5 Units Subcutaneous At Bedtime  Yes Unknown, Entered By History   gabapentin (NEURONTIN) 300 MG capsule Take 1  capsule (300 mg) by mouth 3 times daily  Yes Toñito Camejo MD   amLODIPine (NORVASC) 5 MG tablet Take 1 tablet (5 mg) by mouth daily  Yes Toñito Camejo MD   tacrolimus (PROGRAF - GENERIC EQUIVALENT) 1 MG capsule Take 4capsules (4mg) in the morning and 3 capsules (3 mg) in the evening. Take every 12 hours.  Patient taking differently: 5 mg 2 times daily Take 4capsules (4mg) in the morning and 3 capsules (3 mg) in the evening. Take every 12 hours.  Yes Toñito Camejo MD   mycophenolate (CELLCEPT - GENERIC EQUIVALENT) 250 MG capsule Take 1 capsule (250 mg) by mouth every 12 hours  Patient taking differently: Take 500 mg by mouth every 12 hours   Yes Toñito Camejo MD   sulfamethoxazole-trimethoprim (BACTRIM/SEPTRA) 400-80 MG per tablet Take 1 tablet on Monday and take 1 tablet on Thursday  Yes Toñito Camejo MD   fludrocortisone (FLORINEF) 0.1 MG tablet Take 1 tablet (0.1 mg) by mouth daily For elevated potassium  Yes Toñito Camejo MD   Linagliptin (TRADJENTA PO) Take 5 mg by mouth daily   Yes Reported, Patient   tadalafil (CIALIS) 5 MG tablet Take 1 tablet (5 mg) by mouth daily Never use with nitroglycerin, terazosin or doxazosin.  Yes Toñito Rivas MD   cyclobenzaprine (FLEXERIL) 10 MG tablet Take 1 tablet (10 mg) by mouth 3 times daily as needed for muscle spasms  Yes Toñito Camejo MD   omeprazole (PRILOSEC) 20 MG CR capsule Take 1 capsule (20 mg) by mouth 2 times daily (before meals)  Yes Toñito Camejo MD   magnesium oxide (MAG-OX) 400 MG tablet Take 1 tablet (400 mg) by mouth 2 times daily  Yes Adonay Cross MD   lidocaine (LIDODERM) 5 % Patch Place 1 patch onto the skin every 24 hours Past Month at Unknown time Yes Dora Elizabeth NP   LACTOBACILLUS EXTRA STRENGTH CAPS Take 1 capsule by mouth 2 times daily  Yes Dora Elizabeth NP   prochlorperazine (COMPAZINE) 5 MG tablet Take 1-2 tablets (5-10 mg) by mouth every 6 hours as needed for nausea or vomiting Past Month at Unknown time Yes  Dora Elizabeth, NP   oxyCODONE (ROXICODONE) 5 MG IR tablet Take 1-2 tablets (5-10 mg) by mouth every 4 hours as needed for moderate to severe pain  Yes Ada Driver PA-C   aspirin 325 MG tablet 1 tablet (325 mg) by Oral or Feeding Tube route daily  Yes Ada Driver PA-C   insulin aspart (NOVOLOG PEN) 100 UNIT/ML injection DOSE:  1 units per 8 grams of carbohydrate. With meals and snacks. Only chart total amount of units given.  Do not give if pre-prandial glucose is less than 60 mg/dL.  Yes Ada Driver PA-C   insulin glargine (LANTUS) 100 UNIT/ML injection Inject 45 Units Subcutaneous every 24 hours  Yes Ada Driver PA-C   valGANciclovir (VALCYTE) 450 MG tablet Take 1 tablet (450 mg) by mouth daily  Yes Ada Driver PA-C   multivitamin, therapeutic with minerals (THERA-VIT-M) TABS tablet Take 1 tablet by mouth daily  Yes Ada Driver PA-C   ondansetron (ZOFRAN-ODT) 4 MG ODT tab Take 1 tablet (4 mg) by mouth every 8 hours as needed for nausea  Yes Ada Driver PA-C   glucagon 1 MG SOLR injection Inject 1 mg Subcutaneous every 15 minutes as needed for low blood sugar (May repeat x 1 only)  Yes Godwin Willson MD         Medication history completed by: Aggie Sigala, PharmD

## 2017-08-22 NOTE — PLAN OF CARE
Problem: Individualization  Goal: Patient Preferences     AVSS, weaned to room air, saturating mid to low 90s%  Patient denies pain.  Oxycodone given prior to sitting in chair.  Blood facswckz=921, 116, 75  Patient disoriented to time, otherwise mentation appears intact.  VPM on for better half of the day but has been off since ~1430 to test if it can be dc'd; bed alarm in place for patient safety.  Patient up to chair x2 today.  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  Patient has been continent of urine and using urinal independently 100% of the time.  TF was temporarily off this AM, then resumed at 50ml/hr, then increased to 75ml/hr.  Goal is to cycle tube feeds 75ml/hr [id number].  Patient able to eat some lunch but declined solids for dinner (calorie counts documented).  Scheduled meds given as ordered.  Continue to monitor, treat as ordered, notify team with changes.

## 2017-08-22 NOTE — PLAN OF CARE
Problem: Goal Outcome Summary  Goal: Goal Outcome Summary  PT 7A: Pt tx supine<>sit with mod A and max VCs for logroll technique. Pt tx sit<>stand with FWW, mod A and cues for set-up. Pt amb ~250 feet with FWW and CGA, with 2 standing rest breaks throughout distance. Pt with 2 episodes of slight L knee buckling during gait, able to maintain stability with no added assist and appropriate use of FWW. Pt returned to bed with min A within precautions. Recommend disch to TCU once medically appropriate.

## 2017-08-22 NOTE — PROGRESS NOTES
[facility]    Transplant Infectious Diseases Inpatient Progress note      Camacho Bhagat MRN# [id number]   YOB: 1965 Age: 51 year old   Date of Admission: 7/4/2017  4:45 AM  Transplant: 3/4/2017 (Liver), Postoperative day: 171            Recommendations:   1. Continue PO VGCV 900 mg daily till 8/26/2017 to finish 2 week course of maintenance therapy.   3. Continue to check CMV PCR weekly till the 6 month post OLT yokasta (on 9/4/2017) then may go to every other week or even monthly for couple of more months.   - next CMV PCR on 8/24/2017.   4. Would continue to give G-CSF till ANC is > than 800.   5. Continue augmentin 875 mg bid till 9/6/2017 (total of 4 weeks of zosyn then augmentin).   7. Continue doxycycline 100 mg bid till 8/29/2017 (total of 10 days).   8. Continue to monitor EBV monthly.          Summary of Presentation:   Transplants:  3/4/2017 (Liver), Postoperative day:  171     This patient is a 51 year old male with CASTAÑEDA s/p OLT in 3/2017. Basilixima for induction.   MMF was held due to neutropenia and multiple infections upon admission. TAC was held 7/25/2017 for perforated viscus.      Presented with colitis, s/p exploratory laparotomy. Attributed to neutropenic colitis.   Then started to develop CMV viremia (primary infection)  Now with intra-abdominal abscess and severe sepsis.         Active Problems and Infectious Diseases Issues:   1. CMV viremia (donor derived).   With or without colitis.   The resected colon on 7/26/2017 showed perforation but only 3 cells which were positive for CMV staining. This is less likely to be CMV colitis but treated as such with induction dose of IV GCV from 7/20/2017 till 8/11/2017 (3 weeks) then switched to maintenance dose on 8/12/2017. We should be able to finish the maintenance 2 week course on 8/26/2017.   Then monitor CMV PCR periodically as described int he recommendations section.     2. Leukopenia/neutropenia.   Likely  drug induced (GCV/VGCV).   Has been getting G-CSF intermittently, last dose given on 8/22/2017. Will continue to support with G-CSF till ANC is > than 800.   Hopefully moving from GCV to VGCV then stopping VGCV on 8/26/2017 will help with leukopenia/neutropenia.   Bactrim DCed again 8/17/2017 and the patient was given pentamidine instead.   The patient also has EBV viremia and PTLD is always possible but that should be entertained if WBC/ANC do not improve after the discontinuation of VGCV and/or if EBV viral load is rising.     3. Colon perforation   4. Intraabdominal abscess  Zosyn from 8/9/2017 till 8/22/2017, then augmentin 8/22/2017 till present. Will continue total of 4 weeks of ABx (till 9/6/2017).    5. Erythema of the index.   I think this is part of the ischemic process involving the right index, whether it's going to be progressed into dry ischemia or wet ischemia is not clear. The patient was started on linezolid on 8/19/2017 empirically the switched to doxycycline on 8/22/2017 for total therapy course of 10 days (till 8/29/2017).     6. EBV primary infection (donor derived)  Subsiding.   May check monthly.     Other Infectious Disease issues include  - PCP prophylaxis: bactrim DCed in June of 2017 due to neutropenia. Resumed briefly to be DCed again on 8/17/2017 due to leukopenia. The patient received pentamidine on 8/17/2017.   - Serostatus: CMV D+/R-, EBV D+/R-, HSV1?/2?, VZV ?    Now he's on GCV IV.   - Immunization status: up-to-date  - Gamma globulin status: >1660 as of 7/24/2017.       Attestation:  I interviewed the patient and obtained history from the patient, and by reviewing the patient's chart including outside records, microbiological data, and radiological data. All data are summarized in this notes.  Naseem Figueroa   Pager: [phone]  08/22/2017        Interim History:  No new events or complaints.     HPI:  In summary:  CASTAÑEDA s/p OLT in 3/2017.   Presented with abdominal pain and  underwent exploratory laparotomy which was not c/w with ischemia. It was felt to be related to neutropenia.   The patient has neutropenia since June of 2017 attributed to immunosuppressive therapy/bactrim/VGCV. Due to neutropenia, bactrim DCed June of 2017 and VGCV was DCed at unknown tome.     The patient was started on broad spectrum ABx with persistent fever.   Bronchoscopy done 7/12/2017 grew single colony of VRE which was considered a colonizer.   Ascitic fluid checked on 7/5/2017 and grew S capitis considered a contaminant.   Repeat BAL on 7/15/2017 and repeat paracentesis on 7/7/14/2017 were unremarkable for growth but the ascitic fluid was exudative.     The course was complicated by thrombosis of the right radial artery with ischemia to to the tip of the right index finger.   He also has ischemia to the right hallux and second toe.     The patient finished a course of ertapenem for colitis/fever/sepsis, his mental status started to improve.   CMV PCR became detected at low level and workup for possible CMV colitis was initiated. Colonoscopy done 7/21/2017 was with poor prep and random biopsies were negative for colitis including CMV colitis. GCV was initiated on 7/20/2017 (repeat CMV PCR was 3.3 log on 7/20/2017, a 0.3 log increase from CMV PCR done on 7/18/2017).     On 7/25/2017 he suffered from acute respiratory distress and became obtunded with distended abdomen. He was found to have new free air and intra-abdominal fluid collection.    On 7/26/2017 he underwent exp lap with right hemicolectomy, end ileostomy, and mucus fistula. The pathology showed perforation with only 3 cells positive for CMV staining.   GCV IV was continued with broad spectrum ABx including meropenem/daptomycin/micafungin till around 8/3/2017.    IV GCV was switched to maintenance dose on 8/12/2017 after 2 values of CMV VL <137 IU/mL.     The patient continued to have intermittent fevers and altered mentation with ID concerned about  persistent fistula. On 8/9/2017 the patient underwent CT guided drainage of retroperitoneal fluid collection which was a very difficult procedure. The fluid was described as serosanguinous but grew Cl septicum. The patient was started on linezolid/zosyn on 8/9/2017. On 8/14/2017 another CT showed persistent free air and and peritonitis. A new drain was placed on 8/15/2017 that showed inflammatory process with WBC of >15,000 but with negative cx including anaerobic cx.   Worthwhile to mention that in between the patient also underwent paracentesis on 8/11/2017 with WBC of 5000 and positive cx for Cl septicum and negative fungal cx.     The patient was continued on zosyn, linezolid was DCed on 8/15/2017 to be resumed again on 8/19/2017 for cellulitis of the right finger which sustained (among other fingers) ischemic injury during the hospital course.     The patient did have N/V yesterday but today he has no N/V and no other new complaints. No major events to report. No abdominal pain.       ROS:  As mentioned in the interim history otherwise negative by reviewing central and peripheral neurological systems, respiratory system, cardiac system, GI system,  system, musculoskeletal, skin, allergy, and lymphatics.                  Pysical Examination:  Temp: 98.3  F (36.8  C) Temp src: Oral BP: 160/90 Pulse: 94 Heart Rate: 89 Resp: 22 SpO2: 95 % O2 Device: Nasal cannula Oxygen Delivery: 1 LPM  Vitals:    08/18/17 0900 08/19/17 1408 08/20/17 0900 08/21/17 0800   Weight: 96.9 kg (213 lb 9.6 oz) 101.2 kg (223 lb) 99.8 kg (220 lb 1.6 oz) 100.7 kg (222 lb)    08/22/17 1100   Weight: 102.1 kg (225 lb 1.6 oz)     Constitutional: lying in bed in no apparent distress, answers questions and follows commands.    Abdomen: distended, soft but tender in the RUQ, no masses, no bruit, normal BS, midline wound is intact with no dehiscence or erythema and no drainage, RLQ ileostomy and proximal midline wound with mucus fistula.    Extremities: no pitting edema of bilateral lower extremities, ischemic right hallux and right second toe, also ischemic tip of left second toe, and right index with mild blanching erythema, no ulcers.        Medications:  Medications that Require Transfusion:     IV fluid REPLACEMENT ONLY 65 mL/hr at 08/22/17 0900     NaCl Stopped (08/22/17 0900)   Scheduled Medications:     amoxicillin-clavulanate  875 mg Oral BID     valGANciclovir  900 mg Oral Daily     doxycycline  100 mg Oral BID     pantoprazole  40 mg Intravenous BID     filgrastim (NEUPOGEN/GRANIX) intravenous  5 mcg/kg (Dosing Weight) Intravenous Daily at 8 pm     amLODIPine  10 mg Oral Daily     [START ON 8/23/2017] protein modular  1 packet Per Feeding Tube Daily     tacrolimus  6 mg Oral BID IS     furosemide  20 mg Intravenous BID     insulin glargine  45 Units Subcutaneous Q24H     insulin aspart   Subcutaneous TID w/meals     artificial saliva  15 mL Swish & Spit 4x Daily     heparin  5,000 Units Subcutaneous Q8H     insulin aspart  1-12 Units Subcutaneous Q4H     fludrocortisone  0.1 mg Oral Daily     sodium chloride (PF)  10 mL Irrigation Q8H     fiber modular  1 packet Per Feeding Tube BID     sodium chloride (PF)  20 mL Irrigation Q6H     diphenoxylate-atropine  5 mL Oral 4x Daily     saccharomyces boulardii  250 mg Oral BID     sodium bicarbonate  1,300 mg Per NG tube TID     miconazole with skin protectant   Topical BID     loperamide  4 mg Oral or Feeding Tube 4x Daily     aspirin  80 mg Oral Daily     sodium chloride (PF)  3 mL Intracatheter Q8H       Laboratory Data:   No results found for: ACD4    Inflammatory Markers    Recent Labs   Lab Test  08/16/17   0650  08/09/17   0711  08/07/17   0549  08/06/17   0633  08/05/17   0616  07/05/17   1810  03/05/17   0358  11/23/16   0813   08/15/11   1151  04/11/11   1209  01/03/11   1246  02/15/10   1232   SED   --    --    --    --    --    --    --   45*   --   11  14  17*  25*   CRP  140.0*   190.0*  130.0*  130.0*  140.0*  354.0  20.0*  58.0*   < >  11.1*  9.0*  11.7*  17.5*    < > = values in this interval not displayed.       Immune Globulin Studies     Recent Labs   Lab Test  07/24/17   0705  08/15/11   1243   IGG  1660*  1160   IGG1   --   734   IGG2   --   294   IGG3   --   7*   IGG4   --   59       Metabolic Studies       Recent Labs   Lab Test  08/22/17   1044  08/22/17   0640  08/21/17   0649  08/20/17   2143  08/20/17   1640  08/20/17   0639   08/19/17   0630  08/18/17   0538   08/01/17   0651   07/25/17   0945   07/06/17   0316   NA   --   140  141   --   141  142   --   142  143   < >  150*   < >  137   < >  143   POTASSIUM   --   5.1  5.2  5.1  5.5*  5.4*   --   4.9  4.5   < >  4.3   < >  4.0   < >  5.0   CHLORIDE   --   111*  113*   --   115*  114*   --   115*  116*   < >  123*   < >  104   < >  116*   CO2   --   24  23   --   21  22   --   20  21   < >  20   < >  26   < >  18*   ANIONGAP   --   5  5   --   6  6   --   6  6   < >  7   < >  7   < >  9   BUN   --   45*  48*   --   48*  45*   --   50*  56*   < >  51*   < >  77*   < >  62*   CR   --   0.98  1.00   --   0.98  0.93   --   0.97  1.13   < >  0.90   < >  2.60*   < >  2.75*   GFRESTIMATED   --   80  78   --   80  85   --   81  68   < >  89   < >  26*   < >  24*   GLC   --   200*  129*   --   218*  115*   --   165*  127*   < >  141*   < >  382*   < >  139*   A1C   --    --    --    --    --    --    --    --    --    --    --    --    --    --   Canceled, Test credited   Below Assay Range  NOTIFIED LEONARD ONEILL AT 0538 ON 7/6/17 BY CHRISSY JAMES   --   8.2*  8.6   --   8.1*  8.5   --   8.2*  8.0*   < >  8.1*   < >  7.6*   < >  6.9*   PHOS   --   3.7  3.4   --   3.9  3.7   --   3.5  3.4   < >  3.1   < >   --    < >  5.5*   MAG   --   2.0  1.9   --    --   1.7   --   1.7  1.7   < >  1.9   < >   --    < >  2.1   LACT  0.8   --    --    --   0.7   --    < >   --    --    < >   --    < >   --    < >  1.5   CKT   --    --    --    --    --     --    --    --    --    --   16*   --   40   --    --     < > = values in this interval not displayed.       Hepatic Studies    Recent Labs   Lab Test  08/21/17   0649  08/20/17   1640  08/18/17   0538  08/17/17   0632  08/14/17   0621  08/10/17   1323  08/10/17   0704  08/08/17   0600   07/25/17   0017   07/11/17   0328   BILITOTAL  0.4   --   0.5  0.6  0.7   --   2.2*  0.5   < >   --    < >  0.5   ALKPHOS  245*   --   236*  254*  271*   --   136  172*   < >   --    < >  158*   ALBUMIN  1.6*  1.6*  1.7*  1.7*  1.8*   --   1.9*  2.1*   < >   --    < >  1.8*   AST  39   --   32  33  24   --   36  28   < >   --    < >  34   ALT  24   --   21 21  17   --   26  25   < >   --    < >  27   LDH   --    --    --    --    --   168   --    --    --   258*   --    --    GGT   --    --    --    --    --    --    --    --    --    --    --   58    < > = values in this interval not displayed.       Pancreatitis testing    Recent Labs   Lab Test  08/16/17   0650  07/24/17   0705  07/17/17   0630  07/12/17   0342  07/10/17   0340  07/05/17   0346  03/05/17   0358  03/03/17   1458  02/21/17   1520  12/09/16   1159  02/08/16   1144   09/30/10   1734   AMYLASE   --    --    --    --    --    --   53  70   --    --    --    --   82   LIPASE  55*   --    --    --    --    --   216   --   326  161   --    --   138   TRIG   --   303*  168*  114  177*  174*   --    --    --    --   99   < >   --     < > = values in this interval not displayed.       Hematology Studies      Recent Labs   Lab Test  08/22/17   1044  08/22/17   0640  08/21/17   0649  08/20/17   0639  08/19/17   0904  08/18/17   0538  08/17/17   0632   WBC   --   1.3*  1.7*  1.5*  2.1*  1.6*  1.9*   ANEU   --   0.6*  0.8*  0.7*  1.1*  0.8*  1.1*   ALYM   --   0.6*  0.7*  0.5*  0.6*  0.6*  0.6*   ZINA   --   0.1  0.1  0.2  0.3  0.2  0.3   AEOS   --   0.0  0.0  0.0  0.0  0.0  0.0   HGB  7.9*  7.2*  7.6*  8.1*  8.7*  6.9*  7.3*   HCT   --   23.0*  23.5*  25.9*  27.2*  21.7*   22.8*   PLT   --   96*  95*  111*  113*  107*  107*       Arterial Blood Gas Testing    Recent Labs   Lab Test  08/10/17   1515  08/02/17   1216  07/26/17   2243  07/26/17   2133   07/26/17   2017  07/25/17   1746   PH  7.33*  7.37  Incorrect specimen type  NOTTIED BY WOJCIECH DEWITT, NEW ORDER TO REPLACE.7/26/17 AT 2346 BY ZAINAB.  CORRECTED ON 07/26 AT 2350: PREVIOUSLY REPORTED AS 7.27     --    --   Canceled, Test credited   Incorrect specimen type    7.32*   PCO2  34*  31*  Incorrect specimen type  NOTTIED BY WOJCIECH DEWITT, NEW ORDER TO REPLACE.7/26/17 AT 2346 BY ZAINAB.  CORRECTED ON 07/26 AT 2350: PREVIOUSLY REPORTED AS 45     --    --   Canceled, Test credited   Incorrect specimen type    42   PO2  68*  69*  Incorrect specimen type  NOTTIED BY WOJCIECH DEWITT, NEW ORDER TO REPLACE.7/26/17 AT 2346 BY ZAINAB.  CORRECTED ON 07/26 AT 2350: PREVIOUSLY REPORTED AS 53     --    --   Canceled, Test credited   Incorrect specimen type    81   HCO3  18*  18*  Incorrect specimen type  NOTTIED BY WOJCIECH DEWITT, NEW ORDER TO REPLACE.7/26/17 AT 2346 BY ZAINAB.  CORRECTED ON 07/26 AT 2350: PREVIOUSLY REPORTED AS 20     --    --   Canceled, Test credited   Incorrect specimen type    22   O2PER  2L  21  Incorrect specimen type  NOTTIED BY WOJCIECH DEWITT, NEW ORDER TO REPLACE.7/26/17 AT 2346 BY ZAINAB.  CORRECTED ON 07/26 AT 2350: PREVIOUSLY REPORTED AS 40    40  62   < >  100.0  100  40.0    < > = values in this interval not displayed.        Urine Studies     Recent Labs   Lab Test  08/10/17   1752  08/08/17   1600  08/05/17   0617  07/28/17   1042  07/25/17   1205   URINEPH  5.0  5.0  5.0  5.0  5.0   NITRITE  Negative  Negative  Negative  Negative  Negative   LEUKEST  Negative  Negative  Negative  Negative  Trace*   WBCU  10*  7*  5*  6*  5*       Vancomycin Levels     Recent Labs   Lab Test  07/06/17   0316  10/28/16   1405   VANCOMYCIN  22.1  14.4       Tacrolimus levels    Invalid input(s): TACROLIMUS, TAC, TACR  Transplant Immunosuppression  Labs Latest Ref Rng & Units 8/22/2017 8/21/2017 8/20/2017 8/20/2017 8/19/2017   Tacro Level 5.0 - 15.0 ug/L 3.9(L) 4.4(L) - 4.6(L) 4.5(L)   Tacro Level - Not Provided Not Provided - Not Provided Not Provided   Creat 0.66 - 1.25 mg/dL 0.98 1.00 0.98 0.93 0.97   BUN 7 - 30 mg/dL 45(H) 48(H) 48(H) 45(H) 50(H)   WBC 4.0 - 11.0 10e9/L 1.3(L) 1.7(L) - 1.5(L) 2.1(L)   Neutrophil % 46.1 48.3 - 46.8 51.5   ANEU 1.6 - 8.3 10e9/L 0.6(L) 0.8(L) - 0.7(L) 1.1(L)       CSF testing     Recent Labs   Lab Test  06/11/09   0225   CWBC  1   CRBC  2   CGLU  104*   CTP  54         Microbiology:  As reviewed in the HPI paragraph.     Last check of C difficile  C Diff Toxin B PCR   Date Value Ref Range Status   08/13/2017  NEG Final    Negative  Negative: Clostridium difficile target DNA sequences NOT detected, presumed   negative for Clostridium difficile toxin B or the number of bacteria present   may be below the limit of detection for the test.   FDA approved assay performed using RVX GeneXpert real-time PCR.   A negative result does not exclude actual disease due to Clostridium difficile   and may be due to improper collection, handling and storage of the specimen or   the number of organisms in the specimen is below the detection limit of the   assay.         Virology:  CMV viral loads    Recent Labs   Lab Test  08/17/17   0632  08/10/17   0704  08/03/17   0533  07/27/17   1109  07/20/17   1427   CSPEC  Plasma, EDTA anticoagulant  EDTA PLASMA  CORRECTED ON 08/11 AT 0714: PREVIOUSLY REPORTED AS Whole Blood    EDTA PLASMA  EDTA PLASMA  CORRECTED ON 07/28 AT 0840: PREVIOUSLY REPORTED AS Blood    Plasma   CMVLOG  Not Calculated  <2.1  <2.1  2.5*  3.3*       CMV viral loads    Log IU/mL of CMVQNT   Date Value Ref Range Status   08/17/2017 Not Calculated <2.1 [Log_IU]/mL Final   08/10/2017 <2.1 <2.1 [Log_IU]/mL Final   08/03/2017 <2.1 <2.1 [Log_IU]/mL Final   07/27/2017 2.5 (H) <2.1 [Log_IU]/mL Final   07/20/2017 3.3 (H) <2.1  [Log_IU]/mL Final   07/18/2017 3.0 (H) <2.1 [Log_IU]/mL Final   07/15/2017 3.6 (H) <2.1 [Log_IU]/mL Final   07/10/2017 2.2 (H) <2.1 [Log_IU]/mL Final   07/03/2017 <2.1 <2.1 [Log_IU]/mL Final   06/26/2017 Not Calculated <2.1 [Log_IU]/mL Final       CMV resistance testing  Recent Labs   Lab Test  07/27/17   1109   CMVCID  Canceled, Test credited   Unsatisfactory specimen   Quantity not sufficient   Notification of test cancellation was given to  Naseem Kemp.7/28/17 at 1408.TV.     CMVFOS  Canceled, Test credited   Unsatisfactory specimen   Quantity not sufficient   Notification of test cancellation was given to  Naseem Kemp.7/28/17 at 1408.TV.     CMVGAN  Canceled, Test credited   Unsatisfactory specimen   Quantity not sufficient   Notification of test cancellation was given to  Naseem Kemp.7/28/17 at 1408.TV.       CMV Cidofovir Resist   Date Value Ref Range Status   07/27/2017   Final    Canceled, Test credited   Unsatisfactory specimen   Quantity not sufficient   Notification of test cancellation was given to  Naseem Kemp.7/28/17 at 1408.TV.       CMV Foscarnet Resist   Date Value Ref Range Status   07/27/2017   Final    Canceled, Test credited   Unsatisfactory specimen   Quantity not sufficient   Notification of test cancellation was given to  Naseem Kemp.7/28/17 at 1408.TV.       CMV Ganciclovir Resis   Date Value Ref Range Status   07/27/2017   Final    Canceled, Test credited   Unsatisfactory specimen   Quantity not sufficient   Notification of test cancellation was given to  Naseem Kemp.7/28/17 at 1408.TV.         USCNS Invalid input(s): USCN, USCNS    USCNS No components found for: USCN, USCNS      No results found for: H6RES    EBV DNA Copies/mL   Date Value Ref Range Status   08/15/2017 753 (A) EBVNEG^EBV DNA Not Detected [Copies]/mL Final   08/01/2017 6864 (A) EBVNEG [Copies]/mL Final   07/18/2017 267752 (A) EBVNEG [Copies]/mL Final         Pathology  Colon pathology after resection  7/26/2017   FINAL DIAGNOSIS:   A. COLON, TERMINAL ILEUM, APPENDIX, RIGHT COLON, TRANSVERSE COLON, RIGHT   ART-COLECTOMY:   - Colonic wall with perforation, edema, and acute serositis   - Background colonic mucosa with no significant histologic abnormality   - CMV immunostain is positive in rare (3) cells   - Terminal ileum with no significant histologic abnormality   - Viable, unremarkable surgical margins   - One benign lymph node (0/1)   - Appendix with fibrous obliteration of the tip     B. OMENTUM, PARTIAL OMENTECTOMY:   - Fibroadipose tissue with acute and chronic inflammation, fat necrosis   Colon biopsies 7/21/2017   A: Colon biopsy, ascending   B: Colon biopsy, descending   FINAL DIAGNOSIS:   A. COLON BIOPSY, ASCENDING:   - Colonic mucosa with no significant histologic abnormality   - Negative for intraepithelial lymphocytosis or deposition of   subepithelial thick collagenous band   B. COLON BIOPSY, DESCENDING:   - Crypt architecture distortion, consistent with healed prior injury   - Negative for active inflammation or dysplasia   - Negative for intraepithelial lymphocytosis or deposition of   subepithelial thick collagenous band     Cytology of ascitic fluid 7/25/2017   PERITONEAL FLUID, ASCITES:   -Negative for malignancy   Cytology of ascitic fluid 7/14/2017.   Peritoneal fluid, ascites:   - Negative for malignancy   - Acute and chronic inflammation present   Specimen Adequacy: Satisfactory for evaluation.   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   INTERPRETATION:   Ascites fluid:        Rare to absent viable B cells     COMMENT:   This specimen was collected on 7/14/17 but was not ordered/delivered to   lab/run until 7/18/17 - results should be interpreted with caution due   to low cellularity/viability.     Due to limited cellularity we were only able to analyze the B cells.   There is no immunophenotypic evidence of B cell non-Hodgkin lymphoma.   Neoplastic cells, including large cells, may not  survive specimen   processing. This sample may not be representative. Final interpretation   requires correlation with morphologic and clinical features.       Imaging:  Right finger XR 8/19/2017 reviewed by myself   IMPRESSION: No erosive changes in the right index finger to suggest  osteomyelitis.     CT a/p 8/15/2017  IMPRESSION:   1. Contrast opacification of the mucous fistula which is contiguous  with the transverse and descending colon. No leak identified. Distal  sigmoid colon and rectum were not well opacified. Unchanged wall  thickening involving the descending colon and rectum.  2. Interval placement of anterior abdominal drainage catheter and  stable position of right retroperitoneal drainage catheter. Multiple  intra-abdominal fluid collections containing foci of gas, the largest  of which has decreased in size secondary to catheter placement.   3. Stable mild bilateral hydronephrosis. Bladder wall thickening may  be reactive or due to a urinary tract infection.  4. Stable bibasilar atelectasis and small pleural effusions right  greater than left.  5. Splenomegaly.  CT a/p 8/14/2017  IMPRESSION:   1a. Moderate volume ascites, stable with increased foci of  intraperitoneal gas, potentially relating to catheter flushes,  redistribution of retroperitoneal gas or infection though given recent  surgical procedures on the presence of two ostomies, perforation or  dehiscence is also a consideration.  1b. Thickened peritoneum concerning for peritonitis.  2. Complex right retroperitoneal gas containing collection is stable  to slightly decreased in size compared with 8/9/2017. Pigtail catheter  is in good position.   3. Stable loculated bibasilar pleural effusions and bibasilar  consolidative opacities which could represent infection or  atelectasis.  4. Postsurgical changes of right hemicolectomy. Several loops of  borderline dilated small bowel; however, oral contrast reaches the  ileostomy.  5. Stable left  hydronephrosis.    CT c/a/p 7/25/2017 reviewed by myself.   IMPRESSION:   1. New large heterogeneous area of right-sided retroperitoneal gas  which is highly concerning for an infectious process. Given the  history of prior neutropenic colitis and biopsies, there could also be  a component of stool from a ruptured viscus, despite the lack of  surrounding bowel loops and no extraluminal oral contrast. Surgical  consultation is recommended.   2. Bibasilar atelectasis versus consolidation with small bilateral  pleural effusions.  3. Extensive mesenteric and soft tissue edema with large volume  ascites. Numerous mesenteric and retroperitoneal lymph nodes which are  presumably reactive.  4. Postsurgical changes of liver transplant.  CT c/a/p7/13/2017 Reviewed by myself.   IMPRESSION:   1. Improved moderate right-sided pleural effusion and resolution of  left-sided pleural effusion. There remain large areas of  atelectasis/consolidation in both lungs, including in the left lower  lobe despite resolution of left-sided pleural effusion. Superimposed  infectious process cannot be excluded.  2. Moderate-large amount of ascites increased from 7/8/2017, which  makes it difficult to evaluate for the presence or absence of  inflammatory change or infectious process in the abdomen or pelvis. No  intra-abdominal abscess.  3. Stable small presumed hematoma within the ventral abdominal wall  fat.  4. Splenomegaly.    MRI brain 7/20/2017.   Impression:  1. Significant image degradation due to motion artifact, with no  definite acute intracranial pathology. No abnormal contrast enhancing  intracranial lesions.  2. Mucosal thickening in the sphenoid and maxillary sinuses and left  greater than right mastoid fluid are nonspecific in the setting of  recent intubation.    Naseem Figueroa  Pager: [phone]

## 2017-08-22 NOTE — PLAN OF CARE
Problem: Goal Outcome Summary  Goal: Goal Outcome Summary     VPM in place. Initially agitated but mostly pleasant. Disoriented to time and place intermittently, thought he was at [place] because he resides nearby. Using call light intermittently. BP 150s, OVSS on 1LPM NC. Denies pain. Denies nausea. Reg diet w/ Thin Liq tolerated, sipped clears overnight. TF resumed at 0038 after being clogged and D10W stopped. BGs 160, 231 overnight; corrected with SSI. Drains irrigated per orders. Ileostomy intact with brown output, stool is loose/soft. Continent this shift, using urinal appropriately. PICC infusing with TKO and abx. Refused to be turned or repositioned overnight, stated he likes laying against railing. Will continue to monitor and follow POC.

## 2017-08-23 ENCOUNTER — APPOINTMENT (OUTPATIENT)
Dept: PHYSICAL THERAPY | Facility: CLINIC | Age: 52
End: 2017-08-23
Attending: TRANSPLANT SURGERY
Payer: COMMERCIAL

## 2017-08-23 ENCOUNTER — APPOINTMENT (OUTPATIENT)
Dept: CT IMAGING | Facility: CLINIC | Age: 52
End: 2017-08-23
Attending: PHYSICIAN ASSISTANT
Payer: COMMERCIAL

## 2017-08-23 ENCOUNTER — APPOINTMENT (OUTPATIENT)
Dept: OCCUPATIONAL THERAPY | Facility: CLINIC | Age: 52
End: 2017-08-23
Attending: TRANSPLANT SURGERY
Payer: COMMERCIAL

## 2017-08-23 LAB
ANION GAP SERPL CALCULATED.3IONS-SCNC: 4 MMOL/L (ref 3–14)
ANISOCYTOSIS BLD QL SMEAR: SLIGHT
BASOPHILS # BLD AUTO: 0 10E9/L (ref 0–0.2)
BASOPHILS NFR BLD AUTO: 2.7 %
BUN SERPL-MCNC: 45 MG/DL (ref 7–30)
CALCIUM SERPL-MCNC: 8.1 MG/DL (ref 8.5–10.1)
CHLORIDE SERPL-SCNC: 110 MMOL/L (ref 94–109)
CO2 SERPL-SCNC: 25 MMOL/L (ref 20–32)
CREAT SERPL-MCNC: 0.92 MG/DL (ref 0.66–1.25)
DIFFERENTIAL METHOD BLD: ABNORMAL
EOSINOPHIL # BLD AUTO: 0 10E9/L (ref 0–0.7)
EOSINOPHIL NFR BLD AUTO: 0 %
ERYTHROCYTE [DISTWIDTH] IN BLOOD BY AUTOMATED COUNT: 18.7 % (ref 10–15)
FUNGUS SPEC CULT: NORMAL
GFR SERPL CREATININE-BSD FRML MDRD: 87 ML/MIN/1.7M2
GLUCOSE BLDC GLUCOMTR-MCNC: 106 MG/DL (ref 70–99)
GLUCOSE BLDC GLUCOMTR-MCNC: 112 MG/DL (ref 70–99)
GLUCOSE BLDC GLUCOMTR-MCNC: 113 MG/DL (ref 70–99)
GLUCOSE BLDC GLUCOMTR-MCNC: 115 MG/DL (ref 70–99)
GLUCOSE BLDC GLUCOMTR-MCNC: 136 MG/DL (ref 70–99)
GLUCOSE BLDC GLUCOMTR-MCNC: 88 MG/DL (ref 70–99)
GLUCOSE SERPL-MCNC: 139 MG/DL (ref 70–99)
HCT VFR BLD AUTO: 22.2 % (ref 40–53)
HGB BLD-MCNC: 7.1 G/DL (ref 13.3–17.7)
LYMPHOCYTES # BLD AUTO: 0.7 10E9/L (ref 0.8–5.3)
LYMPHOCYTES NFR BLD AUTO: 37.3 %
MAGNESIUM SERPL-MCNC: 1.7 MG/DL (ref 1.6–2.3)
MCH RBC QN AUTO: 29.5 PG (ref 26.5–33)
MCHC RBC AUTO-ENTMCNC: 32 G/DL (ref 31.5–36.5)
MCV RBC AUTO: 92 FL (ref 78–100)
MONOCYTES # BLD AUTO: 0.3 10E9/L (ref 0–1.3)
MONOCYTES NFR BLD AUTO: 19.1 %
NEUTROPHILS # BLD AUTO: 0.7 10E9/L (ref 1.6–8.3)
NEUTROPHILS NFR BLD AUTO: 40.9 %
PHOSPHATE SERPL-MCNC: 3.2 MG/DL (ref 2.5–4.5)
PLATELET # BLD AUTO: 102 10E9/L (ref 150–450)
PLATELET # BLD EST: ABNORMAL 10*3/UL
POTASSIUM SERPL-SCNC: 5.2 MMOL/L (ref 3.4–5.3)
RBC # BLD AUTO: 2.41 10E12/L (ref 4.4–5.9)
SODIUM SERPL-SCNC: 139 MMOL/L (ref 133–144)
SPECIMEN SOURCE: NORMAL
TACROLIMUS BLD-MCNC: 7 UG/L (ref 5–15)
TME LAST DOSE: NORMAL H
WBC # BLD AUTO: 1.8 10E9/L (ref 4–11)

## 2017-08-23 PROCEDURE — 25000128 H RX IP 250 OP 636: Performed by: TRANSPLANT SURGERY

## 2017-08-23 PROCEDURE — 40000133 ZZH STATISTIC OT WARD VISIT

## 2017-08-23 PROCEDURE — 80197 ASSAY OF TACROLIMUS: CPT | Performed by: STUDENT IN AN ORGANIZED HEALTH CARE EDUCATION/TRAINING PROGRAM

## 2017-08-23 PROCEDURE — 27210432 ZZH NUTRITION PRODUCT RENAL BASIC LITER

## 2017-08-23 PROCEDURE — 97535 SELF CARE MNGMENT TRAINING: CPT | Mod: GO

## 2017-08-23 PROCEDURE — 00000146 ZZHCL STATISTIC GLUCOSE BY METER IP

## 2017-08-23 PROCEDURE — 40000193 ZZH STATISTIC PT WARD VISIT: Performed by: PHYSICAL THERAPIST

## 2017-08-23 PROCEDURE — 36592 COLLECT BLOOD FROM PICC: CPT | Performed by: STUDENT IN AN ORGANIZED HEALTH CARE EDUCATION/TRAINING PROGRAM

## 2017-08-23 PROCEDURE — 80048 BASIC METABOLIC PNL TOTAL CA: CPT | Performed by: STUDENT IN AN ORGANIZED HEALTH CARE EDUCATION/TRAINING PROGRAM

## 2017-08-23 PROCEDURE — 25000125 ZZHC RX 250: Performed by: NURSE PRACTITIONER

## 2017-08-23 PROCEDURE — 25000128 H RX IP 250 OP 636: Performed by: STUDENT IN AN ORGANIZED HEALTH CARE EDUCATION/TRAINING PROGRAM

## 2017-08-23 PROCEDURE — 74177 CT ABD & PELVIS W/CONTRAST: CPT

## 2017-08-23 PROCEDURE — 97116 GAIT TRAINING THERAPY: CPT | Mod: GP | Performed by: PHYSICAL THERAPIST

## 2017-08-23 PROCEDURE — 83735 ASSAY OF MAGNESIUM: CPT | Performed by: STUDENT IN AN ORGANIZED HEALTH CARE EDUCATION/TRAINING PROGRAM

## 2017-08-23 PROCEDURE — 12000025 ZZH R&B TRANSPLANT INTERMEDIATE

## 2017-08-23 PROCEDURE — 25000132 ZZH RX MED GY IP 250 OP 250 PS 637: Performed by: STUDENT IN AN ORGANIZED HEALTH CARE EDUCATION/TRAINING PROGRAM

## 2017-08-23 PROCEDURE — 25000132 ZZH RX MED GY IP 250 OP 250 PS 637: Performed by: SURGERY

## 2017-08-23 PROCEDURE — 25000131 ZZH RX MED GY IP 250 OP 636 PS 637: Performed by: NURSE PRACTITIONER

## 2017-08-23 PROCEDURE — 25000132 ZZH RX MED GY IP 250 OP 250 PS 637: Performed by: PHYSICIAN ASSISTANT

## 2017-08-23 PROCEDURE — 85025 COMPLETE CBC W/AUTO DIFF WBC: CPT | Performed by: STUDENT IN AN ORGANIZED HEALTH CARE EDUCATION/TRAINING PROGRAM

## 2017-08-23 PROCEDURE — 25000132 ZZH RX MED GY IP 250 OP 250 PS 637: Performed by: TRANSPLANT SURGERY

## 2017-08-23 PROCEDURE — 25000132 ZZH RX MED GY IP 250 OP 250 PS 637: Performed by: NURSE PRACTITIONER

## 2017-08-23 PROCEDURE — 40000802 ZZH SITE CHECK

## 2017-08-23 PROCEDURE — 25000128 H RX IP 250 OP 636: Performed by: NURSE PRACTITIONER

## 2017-08-23 PROCEDURE — 25000125 ZZHC RX 250: Performed by: STUDENT IN AN ORGANIZED HEALTH CARE EDUCATION/TRAINING PROGRAM

## 2017-08-23 PROCEDURE — 25000132 ZZH RX MED GY IP 250 OP 250 PS 637: Performed by: COLON & RECTAL SURGERY

## 2017-08-23 PROCEDURE — 97530 THERAPEUTIC ACTIVITIES: CPT | Mod: GP | Performed by: PHYSICAL THERAPIST

## 2017-08-23 PROCEDURE — 25000128 H RX IP 250 OP 636: Performed by: PHYSICIAN ASSISTANT

## 2017-08-23 PROCEDURE — 84100 ASSAY OF PHOSPHORUS: CPT | Performed by: STUDENT IN AN ORGANIZED HEALTH CARE EDUCATION/TRAINING PROGRAM

## 2017-08-23 RX ORDER — DIPHENOXYLATE HCL/ATROPINE 2.5-.025/5
5 LIQUID (ML) ORAL 4 TIMES DAILY PRN
Status: DISCONTINUED | OUTPATIENT
Start: 2017-08-23 | End: 2017-08-24

## 2017-08-23 RX ORDER — IOPAMIDOL 755 MG/ML
135 INJECTION, SOLUTION INTRAVASCULAR ONCE
Status: COMPLETED | OUTPATIENT
Start: 2017-08-23 | End: 2017-08-23

## 2017-08-23 RX ORDER — FUROSEMIDE 10 MG/ML
20 SOLUTION ORAL DAILY
Status: DISCONTINUED | OUTPATIENT
Start: 2017-08-24 | End: 2017-08-26

## 2017-08-23 RX ORDER — DIPHENOXYLATE HCL/ATROPINE 2.5-.025/5
5 LIQUID (ML) ORAL 4 TIMES DAILY PRN
Status: DISCONTINUED | OUTPATIENT
Start: 2017-08-23 | End: 2017-08-23

## 2017-08-23 RX ADMIN — MICONAZOLE NITRATE: 20 CREAM TOPICAL at 08:22

## 2017-08-23 RX ADMIN — Medication 250 MG: at 08:21

## 2017-08-23 RX ADMIN — Medication 15 ML: at 15:39

## 2017-08-23 RX ADMIN — Medication 15 ML: at 20:47

## 2017-08-23 RX ADMIN — LOPERAMIDE HYDROCHLORIDE 4 MG: 2 SOLUTION ORAL at 08:21

## 2017-08-23 RX ADMIN — AMOXICILLIN AND CLAVULANATE POTASSIUM 875 MG: 400; 57 POWDER, FOR SUSPENSION ORAL at 08:20

## 2017-08-23 RX ADMIN — Medication 250 MG: at 20:45

## 2017-08-23 RX ADMIN — TACROLIMUS 6 MG: 5 CAPSULE ORAL at 17:37

## 2017-08-23 RX ADMIN — ACETAMINOPHEN 650 MG: 325 SOLUTION ORAL at 04:20

## 2017-08-23 RX ADMIN — LOPERAMIDE HYDROCHLORIDE 4 MG: 2 SOLUTION ORAL at 20:46

## 2017-08-23 RX ADMIN — AMLODIPINE BESYLATE 10 MG: 10 TABLET ORAL at 08:22

## 2017-08-23 RX ADMIN — VALGANCICLOVIR HYDROCHLORIDE 900 MG: 50 POWDER, FOR SOLUTION ORAL at 08:20

## 2017-08-23 RX ADMIN — HEPARIN SODIUM 5000 UNITS: 5000 INJECTION, SOLUTION INTRAVENOUS; SUBCUTANEOUS at 13:37

## 2017-08-23 RX ADMIN — ONDANSETRON 4 MG: 2 INJECTION INTRAMUSCULAR; INTRAVENOUS at 08:38

## 2017-08-23 RX ADMIN — LOPERAMIDE HYDROCHLORIDE 4 MG: 2 SOLUTION ORAL at 15:39

## 2017-08-23 RX ADMIN — HEPARIN SODIUM 5000 UNITS: 5000 INJECTION, SOLUTION INTRAVENOUS; SUBCUTANEOUS at 21:56

## 2017-08-23 RX ADMIN — TACROLIMUS 6 MG: 5 CAPSULE ORAL at 08:21

## 2017-08-23 RX ADMIN — FUROSEMIDE 20 MG: 10 INJECTION, SOLUTION INTRAVENOUS at 08:21

## 2017-08-23 RX ADMIN — OXYCODONE HYDROCHLORIDE 5 MG: 5 SOLUTION ORAL at 08:20

## 2017-08-23 RX ADMIN — Medication 80 MG: at 08:20

## 2017-08-23 RX ADMIN — SODIUM BICARBONATE 650 MG TABLET 1300 MG: at 13:37

## 2017-08-23 RX ADMIN — DOXYCYCLINE CALCIUM 100 MG: 50 SYRUP ORAL at 20:45

## 2017-08-23 RX ADMIN — PANTOPRAZOLE SODIUM 40 MG: 40 INJECTION, POWDER, FOR SOLUTION INTRAVENOUS at 08:19

## 2017-08-23 RX ADMIN — MICONAZOLE NITRATE: 20 CREAM TOPICAL at 20:45

## 2017-08-23 RX ADMIN — DOXYCYCLINE CALCIUM 100 MG: 50 SYRUP ORAL at 08:21

## 2017-08-23 RX ADMIN — OXYCODONE HYDROCHLORIDE 5 MG: 5 SOLUTION ORAL at 00:14

## 2017-08-23 RX ADMIN — FLUDROCORTISONE ACETATE 0.1 MG: 0.1 TABLET ORAL at 08:22

## 2017-08-23 RX ADMIN — Medication 15 ML: at 08:23

## 2017-08-23 RX ADMIN — AMOXICILLIN AND CLAVULANATE POTASSIUM 875 MG: 400; 57 POWDER, FOR SUSPENSION ORAL at 20:45

## 2017-08-23 RX ADMIN — Medication 1 PACKET: at 08:24

## 2017-08-23 RX ADMIN — IOPAMIDOL 135 ML: 755 INJECTION, SOLUTION INTRAVENOUS at 16:06

## 2017-08-23 RX ADMIN — FILGRASTIM 480 MCG: 480 INJECTION, SOLUTION INTRAVENOUS; SUBCUTANEOUS at 20:46

## 2017-08-23 RX ADMIN — SODIUM BICARBONATE 650 MG TABLET 1300 MG: at 20:45

## 2017-08-23 RX ADMIN — Medication 2 G: at 11:01

## 2017-08-23 RX ADMIN — Medication 1 PACKET: at 08:23

## 2017-08-23 RX ADMIN — Medication 1 PACKET: at 20:47

## 2017-08-23 RX ADMIN — SODIUM BICARBONATE 650 MG TABLET 1300 MG: at 08:22

## 2017-08-23 ASSESSMENT — PAIN DESCRIPTION - DESCRIPTORS: DESCRIPTORS: SORE

## 2017-08-23 NOTE — PROGRESS NOTES
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  Now s/p ex lap with R hemicolectomy, end ileostomy, mucus fistula, partial omenectomy on 7/26.  Protracted recovery with recent concerns for infection, bowel perforation.      Glucose trending in goal. First day cycled off TF during the day.    Plan:    -Glargine 20 units started this morning  -Tonight, with less glargine on board, give NPH 20 units for TF.  -meal aspart 1unit/10g CHO ordered for meals and snacks  -correction aspart: high intensity-- will change to qAC and overnight during TF.  -monitor glucose q4h-6h.    Will continue to follow. Discussed with RN.    Please notify diabetes team of changes planned for nutrition support schedule.     Outpatient diabetes follow up: Dr. Eckert at [facility]               Interval History:   8/7/17 Pt upset about inpatient diabetes mgmt consult.  Transplant contacted pt's outpatient endocrinologist Dr. Eckert of [facility]-- no issue with inpatient team managing pt's glucose    Nepro to 75 cc/h x 14 h starting last evening 1800. (Was continuous)  Pt was going to eat breakfast today, but got side-tracked getting ready for shower.  Says he has no concerns about BG at present.  Creatinine remains <1 for several days.      Recent Labs  Lab 08/23/17  1203 08/23/17  0828 08/23/17  0640 08/23/17  0430 08/23/17  0043 08/22/17  2002 08/22/17  1603  08/22/17  0640  08/21/17  0649  08/20/17  1640  08/20/17  0639  08/19/17  0630   GLC  --   --  139*  --   --   --   --   --  200*  --  129*  --  218*  --  115*  --  165*   * 112*  --  113* 88 97 75  < >  --   <  >  --   < >  --   < >  --   < >  --    < > = values in this interval not displayed.            Review of Systems:   See interval hx          Medications:   PTA taking Januvia and glargine versus glargine and aspart per carb    Active Diet Order      Regular Diet Adult     Physical Exam:  Gen: alert, resting in bed, NAD.  HEENT: mucous membranes moist, NJ feeding tube in place  Resp: normal, on RA  Neuro: answering appropriately, cooperative    BP (!) 151/92 (BP Location: Left arm)  Pulse 94  Temp 98.4  F (36.9  C) (Oral)  Resp 20  Ht 1.829 m (6')  Wt 99.9 kg (220 lb 4.8 oz)  SpO2 93%  BMI 29.88 kg/m2           Data:     Lab Results   Component Value Date    A1C  07/06/2017     Canceled, Test credited   Below Assay Range  NOTIFIED LEONARD ONEILL AT 0538 ON 7/6/17 BY CHRISSY      A1C 9.4 02/16/2017    A1C 6.2 12/14/2016    A1C 6.1 10/27/2016    A1C 8.3 07/02/2012            Recent Labs   Lab Test  08/23/17   0640  08/22/17   0640   NA  139  140   POTASSIUM  5.2  5.1   CHLORIDE  110*  111*   CO2  25  24   ANIONGAP  4  5   GLC  139*  200*   BUN  45*  45*   CR  0.92  0.98   JACOB  8.1*  8.2*     CBC RESULTS:   Recent Labs   Lab Test  08/23/17   0640   WBC  1.8*   RBC  2.41*   HGB  7.1*   HCT  22.2*   MCV  92   MCH  29.5   MCHC  32.0   RDW  18.7*   PLT  102*     Janice Guzman APRN [id number]    Diabetes Management job code [id number]

## 2017-08-23 NOTE — PLAN OF CARE
Problem: Goal Outcome Summary  Goal: Goal Outcome Summary  PT 7A: Pt tx supine<>sit at EOB with min-mod A. Pt tx sit<>stand with min-mod A and FWW, requires VCs for forward weight shift. Pt amb 200 ft + 125 ft with FWW and CGA. Pt initially with poor knee control at LLE, moving into hyperextension during stance phase of gait- pt able to correct with min VCs, good carryover for remainder of walk. Recommend disch to TCU to progress IND and endurance with mobility.

## 2017-08-23 NOTE — PLAN OF CARE
Problem: Goal Outcome Summary  Goal: Goal Outcome Summary  OT 7A: Pt demonstrating improved activity tolerance and independence with ADL this date. Pt completing shower task. CGA with FWW for functional mobility into bathroom and for shower transfer over 1 foot threshold with VCs for use of grab bars. Pt SBA for bathing while seated and CGA while standing. Pt appropriate with initiation, sequencing, and termination of all tasks. Dependent for donning/doffing socks 2/2 lack of flexibility and LE weakness for figure four technique. Pt limited by strength, activity tolerance, and cognition. Rec: TCU to progress independence.

## 2017-08-23 NOTE — PLAN OF CARE
Problem: Individualization  Goal: Patient Preferences     AVSS.  Reporting some pain, oxycodone given prior to sitting in chair.  Blood ptiyiren=324, 106, 115  Patient alert and oriented x4.  VPM dc'd, patient using call light appropriately, bed alarm in place for patient safety.  Patient up to chair x2 today, ambulated in bishop, showered with OT.  Wound care and dressing changes performed to mucous fistula, sacrum, pigtail (irrigated per order/70ml output), abdominal drain (irrigated per order/0ml output), and ileostomy (4000ml output).  Patient has been continent of urine and using urinal independently 100% of the time.  Patient with okay appetite (calorie counts documented).  Tube feedings cycled 1949-4065 75ml/hr.  Patient had CT scan this afternoon.  Scheduled meds given as ordered.  Continue to monitor, treat as ordered, notify team with changes

## 2017-08-23 NOTE — PROGRESS NOTES
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 Admitted 7/4/17 from [facility] ED for evaluation and management of sepsis secondary to colitis, taken to OR for initial exploratory laparotomy with findings of typhlitis in the right colon, wound left open with wound vac in place for reexploration and interval closure on 7/5/2017. Concern for CMV colitis with low count CMV viremia/GI PTLD and subsequently underwent colonoscopy on 7/21, random biopsies obtained.  On 7/25/2017 patient became septic with abdominal compartment syndrome. CT noted new large heterogeneous right sided retroperitoneal gas and air tracking along duodenum and underwent a exploratory laparotomy, right angelique-colectomy, end ileostomy, mucus fistula, partial omentectomy on 7/26 by colorectal surgery.  Antibiotics discontinued 8/3. 8/6 became septic with increased abd pain, confusion. 8/9 right 20Fr retroperitoneal drain placed. Additional peritoneal 12F pigtail drain placed 8/15 .    Graft Function: Good function. LFTs acceptable (checking every M, Th).   Immunosuppression Management:   CellCept discontinued (6/27/17) due to neutropenia.   Prograf goal decreased to ~5-6 due to sepsis. Today level 7, ~ 12.5 hr trough, increased from 3.9 yesterday. Dose increased 8/22 PM to 6 mg BID. Would repeat level tomorrow and adjust. Discuss with staff regarding tac level as patient status now improved.  Cardiorespiratory:   -Suspected RONNIE:  Uses O2 overnight only for brief apnea while sleeping per nursing.  -HTN: SBP 150s-160s. Amlodipine to 10mg suspension. Pt on daily Florinef daily for hyperkalemia. Continue lasix, change to susp daily.   Hematology: Pancytopenia present on admission, persists.  Contributing factors include medications and CMV infection.  -Anemia- Hemoglobin 7.1, stable.  Last blood transfusion on 8/18.  -Leukopenia/neutropenia-  WBC 1.8, ANC 0.7.  Received GCSF 8/20 and 8/22, repeat today.  -Thrombocytopenia-  Plt dropped with linezolid.  Monitor.  GI:   -Neutropenic colitis on admission. Colonoscopy 7/21. Biopsy: resolving injury secondary to possible drug induced vs infectious.   -Bowel perforation: 7/25 CT scan abd/pelvis-new large heterogeneous right sided retroperitoneal gas and air tracking along duodenum.  No contrast extravasation.  No intraperitoneal air noted. Colorectal surgery performed Ex lap, right angelique-colectomy, end ileostomy, mucus fistula, partial omentectomy on 7/26. Findings no neida perforation, phlegmon/inflammation right colon. Colon pathology suggested colon perforation, negative for malignancy; CMV stain positive.    -Retroperitoneal abscess/ascites peritonitis: CT A/P 8/9 showed a persistent gas/fluid collection RLQ, peritonitis, some free air but no evidence of contrast extravasation. 8/11: Diag. Para WBC 5,038, culture + clostridium (see below).  8/14 CT scan abd/pelvis: Complex gas-containing fluid collection in the right retroperitoneum decreased slightly in size, moderate ascites with intraperitoneal gas, peritonitis noted, no bowel leak.  Antibiotic coverage as below. Repeat CT abd/pelvis scan with PO contrast to evaluate fluid collections today.  -Diet:  Regular diet  + Nepro NJ feeds.  TF cycled overnight.  Calorie counts.  -High output  ileostomy:  Goal ileostomy output ~ 1200 cc in 24 hrs. Stool output 300 yesterday, though TF held/changed to cycled. Continue loperamide 4mg QID and fiber BID. Change Lomotil QID PRN.   Fluid/Electrolytes/Renal:   -EJ: on admission, d/t ATN sec to sepsis. Now resolved, Cr < 1.  -Hypernatremia: resolved with free water.    -Hyperkalemia: K 5.2 on lasix and florinef. Change lasix to susp daily  Endocrine: DM type 2. Endocrine consulting for glycemic management. Notified of plan to cycle TF.  Infectious disease: Afebrile.  WBC 1.8. ID following.    -Retroperitoneal  abscess/peritonitis: CT C/A/P 8/9 showed a persistent gas/fluid collection RLQ, peritonitis, some free air but no evidence of contrast extravasation.  8/11 paracentesis: WBC 5,038, drain cultures + clostridium septicum (day 6, broth only).  Repeat 8/15, WBC 15,680, culture NGTD.  On zosyn (8/9-8/22), linezolid (8/9-8/15), switched to Augmentin 8/22  -CMV viremia: Primary CMV infection.  (CMV R-D+) CMV colitis per pathology, peak serum 7/15 4225 IU/ml.   IV Ganciclovir (started 7/20).  CMV count fell to <137. Decreased Ganciclovir to 5mg/kg on 8/18.  Switched to valcyte 900mg daily 8/22, continue until 8/26.  Check CMV quant weekly until 9/24 and then every other week for 2 months.  -EBV viremia:  Peak serum 406,697 copies/ml on 7/18.   753 copies/ml on 8/15.  Check quant monthly, due 9/15.  -Cellulitis/dry gangrene:  Right 1st finger, on linezolid (8/19- 8/22), switch to doxycycline x7 days.  Clinically improved.  Resolved issues:  -Severe sepsis: On admission, secondary to right colon phlegmon. Meropenem/daptomycin/micafungin tx x 10 days completed on 8/3. S/p right angelique-colectomy.  Path: CMV stain +, perforation of colon noted.   Neuro: Toxic metabolic encephalopathy secondary to infection, prolonged hospital stay.  Generally improved.  Vascular:  Evidence of microvascular injury in digits (toes) this is most likely due to injury while patient was on pressor therapy with sepsis. Erythema right 1st finger documented and marked.   Activity: Deconditioned due to prolong hospital course. Daily PT/OT, encourage pt to be OOB to chair. Encourage pulmonary toilet.   Prophylaxis: DVT-Heparin SQ  Disposition: 7A.     Medical Decision Making: Medium    PILLO/Fellow/Resident Provider: Ada Driver PA-C [id number]    Faculty: Drew Dalal MD     __________________________________________________________________  Interval History:   Overnight events: No c/o of abdominal pain. No SOB. Fair intake. Motivated to increase if he can  find foods that appeal to him. Stool output brown.     ROS:   A 10-point review of systems was negative except as noted above.    Meds:    insulin aspart  1-12 Units Subcutaneous 5x Daily     [START ON 8/24/2017] furosemide  20 mg Oral Daily     pantoprazole  40 mg Oral Daily     amoxicillin-clavulanate  875 mg Oral BID     valGANciclovir  900 mg Oral Daily     doxycycline  100 mg Oral BID     filgrastim (NEUPOGEN/GRANIX) intravenous  5 mcg/kg (Dosing Weight) Intravenous Daily at 8 pm     amLODIPine  10 mg Oral Daily     protein modular  1 packet Per Feeding Tube Daily     tacrolimus  6 mg Oral BID IS     insulin isophane human  20 Units Subcutaneous QPM     insulin glargine  20 Units Subcutaneous QAM     insulin aspart   Subcutaneous TID w/meals     artificial saliva  15 mL Swish & Spit 4x Daily     heparin  5,000 Units Subcutaneous Q8H     fludrocortisone  0.1 mg Oral Daily     sodium chloride (PF)  10 mL Irrigation Q8H     fiber modular  1 packet Per Feeding Tube BID     sodium chloride (PF)  20 mL Irrigation Q6H     saccharomyces boulardii  250 mg Oral BID     sodium bicarbonate  1,300 mg Per NG tube TID     miconazole with skin protectant   Topical BID     loperamide  4 mg Oral or Feeding Tube 4x Daily     aspirin  80 mg Oral Daily     sodium chloride (PF)  3 mL Intracatheter Q8H       Physical Exam:     Admit Weight: 94.2 kg (207 lb 11.2 oz) (SCALE 2)    Current vitals:   /83  Pulse 94  Temp 98.1  F (36.7  C)  Resp 20  Ht 1.829 m (6')  Wt 99.9 kg (220 lb 4.8 oz)  SpO2 93%  BMI 29.88 kg/m2    Vital sign ranges:    Temp:  [97.6  F (36.4  C)-98.6  F (37  C)] 98.1  F (36.7  C)  Heart Rate:  [89-99] 89  Resp:  [18-22] 20  BP: (154-169)/(83-95) 154/83  SpO2:  [92 %-94 %] 93 %  Patient Vitals for the past 24 hrs:   BP Temp Temp src Heart Rate Resp SpO2 Weight   08/23/17 0900 - - - - - - 99.9 kg (220 lb 4.8 oz)   08/23/17 0800 154/83 98.1  F (36.7  C) - 89 20 93 % -   08/23/17 0428 (!) 169/95 97.7  F  (36.5  C) Oral 99 22 94 % -   08/22/17 2254 159/87 97.8  F (36.6  C) Oral 97 20 94 % -   08/22/17 2001 (!) 155/91 98.6  F (37  C) Oral 96 18 93 % -   08/22/17 1600 (!) 158/94 97.6  F (36.4  C) Oral 94 20 92 % -   08/22/17 1223 (!) 158/92 98.4  F (36.9  C) - 91 20 94 % -   08/22/17 1100 - - - - - - 102.1 kg (225 lb 1.6 oz)     General Appearance: NAD, laying in bed  Skin: normal, warm, dry  Heart: RRR  Lungs: BCTA, nonlabored respirations, uses o2 at night  Abdomen: soft, rounded, minimally tender. Ileostomy with brown/orange output. Mucus fistula covered. Midline incision, c/d/i.  Right abdominal Drains: both serosang  : good UO  Extremities: No edema. R index finger with necrotic blacked finger with surrounding erythema, decreased erythema today.  Necrotic blackened areas on right 1st & 2nd toes and left 2nd toe.  Neurologic: A&O x 2-4, speech appropriate      Data:   CMP    Recent Labs  Lab 08/23/17  0640 08/22/17  0640 08/21/17  0649  08/20/17  1640  08/18/17  0538  08/16/17  1100    140 141  --  141  < > 143  < >  --    POTASSIUM 5.2 5.1 5.2  < > 5.5*  < > 4.5  < >  --    CHLORIDE 110* 111* 113*  --  115*  < > 116*  < >  --    CO2 25 24 23  --  21  < > 21  < >  --    * 200* 129*  --  218*  < > 127*  < >  --    BUN 45* 45* 48*  --  48*  < > 56*  < >  --    CR 0.92 0.98 1.00  --  0.98  < > 1.13  < >  --    GFRESTIMATED 87 80 78  --  80  < > 68  < >  --    GFRESTBLACK >90 >90 >90  --  >90  < > 82  < >  --    JACOB 8.1* 8.2* 8.6  --  8.1*  < > 8.0*  < >  --    MAG 1.7 2.0 1.9  --   --   < > 1.7  < >  --    PHOS 3.2 3.7 3.4  --  3.9  < > 3.4  < >  --    ALBUMIN  --   --  1.6*  --  1.6*  --  1.7*  < >  --    BILITOTAL  --   --  0.4  --   --   --  0.5  < >  --    ALKPHOS  --   --  245*  --   --   --  236*  < >  --    AST  --   --  39  --   --   --  32  < >  --    ALT  --   --  24  --   --   --  21  < >  --    FAMY  --   --   --   --   --   --   --   --  10   FLIPA  --   --   --   --   --   --   --   --  34    < > = values in this interval not displayed.  CBC    Recent Labs  Lab 08/23/17  0640 08/22/17  1044 08/22/17  0640   HGB 7.1* 7.9* 7.2*   WBC 1.8*  --  1.3*   *  --  96*     COAGS  No lab results found in last 7 days.    Invalid input(s): XA   Urinalysis  Recent Labs   Lab Test  08/10/17   1752  08/08/17   1600   06/14/17   1508   04/11/16   1345   COLOR  Yellow  Yellow   < >   --    < >  Yellow   APPEARANCE  Slightly Cloudy  Slightly Cloudy   < >   --    < >  Clear   URINEGLC  Negative  Negative   < >   --    < >  30*   URINEBILI  Small   This is an unconfirmed screening test result. A positive result may be false.  *  Negative   < >   --    < >  Negative   URINEKETONE  Negative  Negative   < >   --    < >  Negative   SG  1.012  1.010   < >   --    < >  1.016   UBLD  Negative  Negative   < >   --    < >  Small*   URINEPH  5.0  5.0   < >   --    < >  5.0   PROTEIN  30*  30*   < >   --    < >  30*   NITRITE  Negative  Negative   < >   --    < >  Negative   LEUKEST  Negative  Negative   < >   --    < >  Negative   RBCU  0  3*   < >   --    < >  1   WBCU  10*  7*   < >   --    < >  1   UTPG   --    --    --   1.55*   --   0.41*    < > = values in this interval not displayed.

## 2017-08-23 NOTE — PROGRESS NOTES
Calorie Counts  Intake recorded for: 8/22  Kcals: 666  Protein: 34g  # Meals Recorded: 100% 3 milks, 3 apple juices, 75% chicken noodle soup  # Supplements Recorded: 0

## 2017-08-23 NOTE — PLAN OF CARE
Problem: Goal Outcome Summary  Goal: Goal Outcome Summary     [id number]:  VPM ordered but unit unplugged overnight. Pt calling appropriately and not impulsive while VPM off. Disoriented to time and place intermittently. -160s, OVSS on RA. Pain managed with Oxy and Tylenol. Denies nausea. Reg diet w/ Thin Liq tolerated, sipped clears overnight. TF cycled at 75cc/hr. BGs 97, 88, 113 overnight; not needing correction. Dressings CDI. Drains irrigated per orders. Ileostomy intact with brown output, stool is loose/soft. Voiding in urinal appropriately. PICC is SL. Refused to be turned or repositioned overnight, but occasionally allowed to be boosted in bed. Will continue to monitor and follow POC.

## 2017-08-24 ENCOUNTER — APPOINTMENT (OUTPATIENT)
Dept: GENERAL RADIOLOGY | Facility: CLINIC | Age: 52
End: 2017-08-24
Attending: NURSE PRACTITIONER
Payer: COMMERCIAL

## 2017-08-24 ENCOUNTER — APPOINTMENT (OUTPATIENT)
Dept: INTERVENTIONAL RADIOLOGY/VASCULAR | Facility: CLINIC | Age: 52
End: 2017-08-24
Attending: NURSE PRACTITIONER
Payer: COMMERCIAL

## 2017-08-24 ENCOUNTER — TELEPHONE (OUTPATIENT)
Dept: INTERVENTIONAL RADIOLOGY/VASCULAR | Facility: CLINIC | Age: 52
End: 2017-08-24

## 2017-08-24 ENCOUNTER — APPOINTMENT (OUTPATIENT)
Dept: OCCUPATIONAL THERAPY | Facility: CLINIC | Age: 52
End: 2017-08-24
Attending: TRANSPLANT SURGERY
Payer: COMMERCIAL

## 2017-08-24 LAB
ALBUMIN SERPL-MCNC: 1.7 G/DL (ref 3.4–5)
ALP SERPL-CCNC: 243 U/L (ref 40–150)
ALT SERPL W P-5'-P-CCNC: 21 U/L (ref 0–70)
ANION GAP SERPL CALCULATED.3IONS-SCNC: 4 MMOL/L (ref 3–14)
ANISOCYTOSIS BLD QL SMEAR: ABNORMAL
AST SERPL W P-5'-P-CCNC: 34 U/L (ref 0–45)
BASOPHILS # BLD AUTO: 0.1 10E9/L (ref 0–0.2)
BASOPHILS NFR BLD AUTO: 2.7 %
BILIRUB DIRECT SERPL-MCNC: 0.2 MG/DL (ref 0–0.2)
BILIRUB SERPL-MCNC: 0.4 MG/DL (ref 0.2–1.3)
BUN SERPL-MCNC: 39 MG/DL (ref 7–30)
CALCIUM SERPL-MCNC: 8.2 MG/DL (ref 8.5–10.1)
CHLORIDE SERPL-SCNC: 110 MMOL/L (ref 94–109)
CMV DNA SPEC NAA+PROBE-ACNC: NORMAL [IU]/ML
CMV DNA SPEC NAA+PROBE-LOG#: NORMAL {LOG_IU}/ML
CO2 SERPL-SCNC: 24 MMOL/L (ref 20–32)
CREAT SERPL-MCNC: 0.9 MG/DL (ref 0.66–1.25)
DIFFERENTIAL METHOD BLD: ABNORMAL
EOSINOPHIL # BLD AUTO: 0 10E9/L (ref 0–0.7)
EOSINOPHIL NFR BLD AUTO: 0.9 %
ERYTHROCYTE [DISTWIDTH] IN BLOOD BY AUTOMATED COUNT: 19.8 % (ref 10–15)
GFR SERPL CREATININE-BSD FRML MDRD: 88 ML/MIN/1.7M2
GLUCOSE BLDC GLUCOMTR-MCNC: 108 MG/DL (ref 70–99)
GLUCOSE BLDC GLUCOMTR-MCNC: 108 MG/DL (ref 70–99)
GLUCOSE BLDC GLUCOMTR-MCNC: 118 MG/DL (ref 70–99)
GLUCOSE BLDC GLUCOMTR-MCNC: 123 MG/DL (ref 70–99)
GLUCOSE BLDC GLUCOMTR-MCNC: 137 MG/DL (ref 70–99)
GLUCOSE BLDC GLUCOMTR-MCNC: 63 MG/DL (ref 70–99)
GLUCOSE BLDC GLUCOMTR-MCNC: 64 MG/DL (ref 70–99)
GLUCOSE BLDC GLUCOMTR-MCNC: 76 MG/DL (ref 70–99)
GLUCOSE BLDC GLUCOMTR-MCNC: 82 MG/DL (ref 70–99)
GLUCOSE BLDC GLUCOMTR-MCNC: 88 MG/DL (ref 70–99)
GLUCOSE SERPL-MCNC: 135 MG/DL (ref 70–99)
HCT VFR BLD AUTO: 23 % (ref 40–53)
HGB BLD-MCNC: 7.3 G/DL (ref 13.3–17.7)
INR PPP: 1.12 (ref 0.86–1.14)
LYMPHOCYTES # BLD AUTO: 0.7 10E9/L (ref 0.8–5.3)
LYMPHOCYTES NFR BLD AUTO: 36.6 %
MAGNESIUM SERPL-MCNC: 1.9 MG/DL (ref 1.6–2.3)
MCH RBC QN AUTO: 29.3 PG (ref 26.5–33)
MCHC RBC AUTO-ENTMCNC: 31.7 G/DL (ref 31.5–36.5)
MCV RBC AUTO: 92 FL (ref 78–100)
MICROCYTES BLD QL SMEAR: PRESENT
MONOCYTES # BLD AUTO: 0.3 10E9/L (ref 0–1.3)
MONOCYTES NFR BLD AUTO: 15.2 %
NEUTROPHILS # BLD AUTO: 0.9 10E9/L (ref 1.6–8.3)
NEUTROPHILS NFR BLD AUTO: 44.6 %
PHOSPHATE SERPL-MCNC: 4.1 MG/DL (ref 2.5–4.5)
PLATELET # BLD AUTO: 108 10E9/L (ref 150–450)
PLATELET # BLD EST: ABNORMAL 10*3/UL
POTASSIUM SERPL-SCNC: 5.5 MMOL/L (ref 3.4–5.3)
PROT SERPL-MCNC: 6.3 G/DL (ref 6.8–8.8)
RBC # BLD AUTO: 2.49 10E12/L (ref 4.4–5.9)
SODIUM SERPL-SCNC: 139 MMOL/L (ref 133–144)
SPECIMEN SOURCE: NORMAL
TACROLIMUS BLD-MCNC: 10.6 UG/L (ref 5–15)
TME LAST DOSE: NORMAL H
TROPONIN I SERPL-MCNC: <0.015 UG/L (ref 0–0.04)
WBC # BLD AUTO: 2 10E9/L (ref 4–11)

## 2017-08-24 PROCEDURE — 25000128 H RX IP 250 OP 636: Performed by: NURSE PRACTITIONER

## 2017-08-24 PROCEDURE — 25000132 ZZH RX MED GY IP 250 OP 250 PS 637: Performed by: TRANSPLANT SURGERY

## 2017-08-24 PROCEDURE — 99152 MOD SED SAME PHYS/QHP 5/>YRS: CPT

## 2017-08-24 PROCEDURE — 27210903 ZZH KIT CR5

## 2017-08-24 PROCEDURE — 93005 ELECTROCARDIOGRAM TRACING: CPT

## 2017-08-24 PROCEDURE — 80197 ASSAY OF TACROLIMUS: CPT | Performed by: STUDENT IN AN ORGANIZED HEALTH CARE EDUCATION/TRAINING PROGRAM

## 2017-08-24 PROCEDURE — 40000133 ZZH STATISTIC OT WARD VISIT: Performed by: OCCUPATIONAL THERAPIST

## 2017-08-24 PROCEDURE — 99153 MOD SED SAME PHYS/QHP EA: CPT

## 2017-08-24 PROCEDURE — 85025 COMPLETE CBC W/AUTO DIFF WBC: CPT | Performed by: STUDENT IN AN ORGANIZED HEALTH CARE EDUCATION/TRAINING PROGRAM

## 2017-08-24 PROCEDURE — 36592 COLLECT BLOOD FROM PICC: CPT | Performed by: RADIOLOGY

## 2017-08-24 PROCEDURE — 12000025 ZZH R&B TRANSPLANT INTERMEDIATE

## 2017-08-24 PROCEDURE — 25000131 ZZH RX MED GY IP 250 OP 636 PS 637: Performed by: NURSE PRACTITIONER

## 2017-08-24 PROCEDURE — 97530 THERAPEUTIC ACTIVITIES: CPT | Mod: GO | Performed by: OCCUPATIONAL THERAPIST

## 2017-08-24 PROCEDURE — 25000132 ZZH RX MED GY IP 250 OP 250 PS 637: Performed by: SURGERY

## 2017-08-24 PROCEDURE — 25800025 ZZH RX 258: Performed by: NURSE PRACTITIONER

## 2017-08-24 PROCEDURE — C1769 GUIDE WIRE: HCPCS

## 2017-08-24 PROCEDURE — 93010 ELECTROCARDIOGRAM REPORT: CPT | Performed by: INTERNAL MEDICINE

## 2017-08-24 PROCEDURE — 49406 IMAGE CATH FLUID PERI/RETRO: CPT

## 2017-08-24 PROCEDURE — 75984 XRAY CONTROL CATHETER CHANGE: CPT

## 2017-08-24 PROCEDURE — 25000128 H RX IP 250 OP 636: Performed by: RADIOLOGY

## 2017-08-24 PROCEDURE — 97110 THERAPEUTIC EXERCISES: CPT | Mod: GO | Performed by: OCCUPATIONAL THERAPIST

## 2017-08-24 PROCEDURE — C1729 CATH, DRAINAGE: HCPCS

## 2017-08-24 PROCEDURE — 25000132 ZZH RX MED GY IP 250 OP 250 PS 637: Performed by: PHYSICIAN ASSISTANT

## 2017-08-24 PROCEDURE — 36592 COLLECT BLOOD FROM PICC: CPT | Performed by: NURSE PRACTITIONER

## 2017-08-24 PROCEDURE — 71010 XR CHEST 1 VW: CPT

## 2017-08-24 PROCEDURE — 27211039 ZZH NEEDLE CR2

## 2017-08-24 PROCEDURE — 27210732 ZZH ACCESSORY CR1

## 2017-08-24 PROCEDURE — 36592 COLLECT BLOOD FROM PICC: CPT | Performed by: STUDENT IN AN ORGANIZED HEALTH CARE EDUCATION/TRAINING PROGRAM

## 2017-08-24 PROCEDURE — 83735 ASSAY OF MAGNESIUM: CPT | Performed by: STUDENT IN AN ORGANIZED HEALTH CARE EDUCATION/TRAINING PROGRAM

## 2017-08-24 PROCEDURE — 25000132 ZZH RX MED GY IP 250 OP 250 PS 637: Performed by: COLON & RECTAL SURGERY

## 2017-08-24 PROCEDURE — 25000132 ZZH RX MED GY IP 250 OP 250 PS 637: Performed by: STUDENT IN AN ORGANIZED HEALTH CARE EDUCATION/TRAINING PROGRAM

## 2017-08-24 PROCEDURE — 25000132 ZZH RX MED GY IP 250 OP 250 PS 637: Performed by: NURSE PRACTITIONER

## 2017-08-24 PROCEDURE — 85610 PROTHROMBIN TIME: CPT | Performed by: RADIOLOGY

## 2017-08-24 PROCEDURE — 25000128 H RX IP 250 OP 636: Performed by: SPEECH-LANGUAGE PATHOLOGIST

## 2017-08-24 PROCEDURE — 80048 BASIC METABOLIC PNL TOTAL CA: CPT | Performed by: STUDENT IN AN ORGANIZED HEALTH CARE EDUCATION/TRAINING PROGRAM

## 2017-08-24 PROCEDURE — 25000128 H RX IP 250 OP 636: Performed by: PHYSICIAN ASSISTANT

## 2017-08-24 PROCEDURE — 84100 ASSAY OF PHOSPHORUS: CPT | Performed by: STUDENT IN AN ORGANIZED HEALTH CARE EDUCATION/TRAINING PROGRAM

## 2017-08-24 PROCEDURE — 27210913 ZZH NUTRITION PRODUCT INTERMEDIATE PACKET

## 2017-08-24 PROCEDURE — 00000146 ZZHCL STATISTIC GLUCOSE BY METER IP

## 2017-08-24 PROCEDURE — 84484 ASSAY OF TROPONIN QUANT: CPT | Performed by: NURSE PRACTITIONER

## 2017-08-24 PROCEDURE — 25800025 ZZH RX 258: Performed by: SURGERY

## 2017-08-24 PROCEDURE — 80076 HEPATIC FUNCTION PANEL: CPT | Performed by: STUDENT IN AN ORGANIZED HEALTH CARE EDUCATION/TRAINING PROGRAM

## 2017-08-24 PROCEDURE — 27210432 ZZH NUTRITION PRODUCT RENAL BASIC LITER

## 2017-08-24 RX ORDER — CEFAZOLIN SODIUM 2 G/100ML
2 INJECTION, SOLUTION INTRAVENOUS
Status: CANCELLED | OUTPATIENT
Start: 2017-08-24

## 2017-08-24 RX ORDER — CEFAZOLIN SODIUM 2 G/100ML
2 INJECTION, SOLUTION INTRAVENOUS ONCE
Status: DISCONTINUED | OUTPATIENT
Start: 2017-08-24 | End: 2017-08-24

## 2017-08-24 RX ORDER — IODIXANOL 320 MG/ML
100 INJECTION, SOLUTION INTRAVASCULAR ONCE
Status: COMPLETED | OUTPATIENT
Start: 2017-08-24 | End: 2017-08-24

## 2017-08-24 RX ORDER — FLUMAZENIL 0.1 MG/ML
0.2 INJECTION, SOLUTION INTRAVENOUS
Status: DISCONTINUED | OUTPATIENT
Start: 2017-08-24 | End: 2017-09-05

## 2017-08-24 RX ORDER — TACROLIMUS 5 MG/1
5 CAPSULE ORAL
Status: DISCONTINUED | OUTPATIENT
Start: 2017-08-24 | End: 2017-08-25

## 2017-08-24 RX ORDER — NALOXONE HYDROCHLORIDE 0.4 MG/ML
.1-.4 INJECTION, SOLUTION INTRAMUSCULAR; INTRAVENOUS; SUBCUTANEOUS
Status: DISCONTINUED | OUTPATIENT
Start: 2017-08-24 | End: 2017-09-08 | Stop reason: HOSPADM

## 2017-08-24 RX ORDER — DIPHENOXYLATE HCL/ATROPINE 2.5-.025/5
5 LIQUID (ML) ORAL 2 TIMES DAILY
Status: DISCONTINUED | OUTPATIENT
Start: 2017-08-24 | End: 2017-08-27

## 2017-08-24 RX ORDER — FENTANYL CITRATE 50 UG/ML
25-50 INJECTION, SOLUTION INTRAMUSCULAR; INTRAVENOUS EVERY 5 MIN PRN
Status: DISCONTINUED | OUTPATIENT
Start: 2017-08-24 | End: 2017-08-29

## 2017-08-24 RX ORDER — DEXTROSE MONOHYDRATE 100 MG/ML
INJECTION, SOLUTION INTRAVENOUS CONTINUOUS
Status: DISCONTINUED | OUTPATIENT
Start: 2017-08-24 | End: 2017-09-02

## 2017-08-24 RX ADMIN — PANTOPRAZOLE SODIUM 40 MG: 40 TABLET, DELAYED RELEASE ORAL at 08:26

## 2017-08-24 RX ADMIN — Medication 5 ML: at 19:57

## 2017-08-24 RX ADMIN — Medication 1 PACKET: at 08:24

## 2017-08-24 RX ADMIN — FUROSEMIDE 20 MG: 10 SOLUTION ORAL at 10:41

## 2017-08-24 RX ADMIN — OXYCODONE HYDROCHLORIDE 5 MG: 5 SOLUTION ORAL at 00:34

## 2017-08-24 RX ADMIN — DEXTROSE MONOHYDRATE: 100 INJECTION, SOLUTION INTRAVENOUS at 16:07

## 2017-08-24 RX ADMIN — SODIUM BICARBONATE 650 MG TABLET 1300 MG: at 10:46

## 2017-08-24 RX ADMIN — TACROLIMUS 6 MG: 5 CAPSULE ORAL at 10:37

## 2017-08-24 RX ADMIN — Medication 80 MG: at 10:43

## 2017-08-24 RX ADMIN — MICONAZOLE NITRATE: 20 CREAM TOPICAL at 11:09

## 2017-08-24 RX ADMIN — Medication 15 ML: at 19:58

## 2017-08-24 RX ADMIN — Medication 1 PACKET: at 10:34

## 2017-08-24 RX ADMIN — Medication 25 ML: at 19:33

## 2017-08-24 RX ADMIN — Medication 5 ML: at 14:45

## 2017-08-24 RX ADMIN — IODIXANOL: 320 INJECTION, SOLUTION INTRAVASCULAR at 17:38

## 2017-08-24 RX ADMIN — Medication 50 ML: at 16:16

## 2017-08-24 RX ADMIN — Medication 20 ML: at 17:28

## 2017-08-24 RX ADMIN — FENTANYL CITRATE 200 MCG: 50 INJECTION, SOLUTION INTRAMUSCULAR; INTRAVENOUS at 17:27

## 2017-08-24 RX ADMIN — ACETAMINOPHEN 650 MG: 325 SOLUTION ORAL at 08:19

## 2017-08-24 RX ADMIN — FILGRASTIM 480 MCG: 480 INJECTION, SOLUTION INTRAVENOUS; SUBCUTANEOUS at 20:02

## 2017-08-24 RX ADMIN — HEPARIN SODIUM 5000 UNITS: 5000 INJECTION, SOLUTION INTRAVENOUS; SUBCUTANEOUS at 05:32

## 2017-08-24 RX ADMIN — Medication 15 ML: at 11:00

## 2017-08-24 RX ADMIN — TACROLIMUS 5 MG: 5 CAPSULE ORAL at 19:57

## 2017-08-24 RX ADMIN — MIDAZOLAM 2 MG: 1 INJECTION INTRAMUSCULAR; INTRAVENOUS at 17:28

## 2017-08-24 RX ADMIN — AMOXICILLIN AND CLAVULANATE POTASSIUM 875 MG: 400; 57 POWDER, FOR SUSPENSION ORAL at 08:27

## 2017-08-24 RX ADMIN — LOPERAMIDE HYDROCHLORIDE 4 MG: 2 SOLUTION ORAL at 10:55

## 2017-08-24 RX ADMIN — VALGANCICLOVIR HYDROCHLORIDE 900 MG: 50 POWDER, FOR SOLUTION ORAL at 10:43

## 2017-08-24 RX ADMIN — DOXYCYCLINE CALCIUM 100 MG: 50 SYRUP ORAL at 10:43

## 2017-08-24 RX ADMIN — ONDANSETRON 4 MG: 2 INJECTION INTRAMUSCULAR; INTRAVENOUS at 08:46

## 2017-08-24 RX ADMIN — HEPARIN SODIUM 5000 UNITS: 5000 INJECTION, SOLUTION INTRAVENOUS; SUBCUTANEOUS at 23:27

## 2017-08-24 RX ADMIN — LOPERAMIDE HYDROCHLORIDE 4 MG: 2 SOLUTION ORAL at 19:57

## 2017-08-24 RX ADMIN — FLUDROCORTISONE ACETATE 0.1 MG: 0.1 TABLET ORAL at 10:40

## 2017-08-24 RX ADMIN — OXYCODONE HYDROCHLORIDE 5 MG: 5 SOLUTION ORAL at 08:16

## 2017-08-24 RX ADMIN — Medication 250 MG: at 10:59

## 2017-08-24 RX ADMIN — MICONAZOLE NITRATE: 20 CREAM TOPICAL at 20:03

## 2017-08-24 RX ADMIN — AMOXICILLIN AND CLAVULANATE POTASSIUM 875 MG: 400; 57 POWDER, FOR SUSPENSION ORAL at 19:57

## 2017-08-24 RX ADMIN — SODIUM BICARBONATE 650 MG TABLET 1300 MG: at 19:57

## 2017-08-24 RX ADMIN — Medication 250 MG: at 19:57

## 2017-08-24 RX ADMIN — DOXYCYCLINE CALCIUM 100 MG: 50 SYRUP ORAL at 19:57

## 2017-08-24 RX ADMIN — IODIXANOL 20 ML: 320 INJECTION, SOLUTION INTRAVASCULAR at 17:47

## 2017-08-24 RX ADMIN — Medication 1 PACKET: at 19:59

## 2017-08-24 RX ADMIN — SODIUM BICARBONATE 650 MG TABLET 1300 MG: at 14:47

## 2017-08-24 RX ADMIN — AMLODIPINE BESYLATE 10 MG: 10 TABLET ORAL at 10:43

## 2017-08-24 NOTE — PROGRESS NOTES
Interventional Radiology Pre-Procedure Sedation Assessment   Time of Assessment: 4:11 PM    Expected Level: Moderate Sedation    Indication: Sedation is required for the following type of Procedure: Drain    Sedation and procedural consent: Risks, benefits and alternatives were discussed with Patient and Spouse    PO Intake: Appropriately NPO for procedure    ASA Class: Class 3 - SEVERE SYSTEMIC DISEASE, DEFINITE FUNCTIONAL LIMITATIONS.    Mallampati: Grade 2:  Soft palate, base of uvula, tonsillar pillars, and portion of posterior pharyngeal wall visible    Lungs: Lungs Clear with good breath sounds bilaterally    Heart: Normal heart sounds and rate    History and physical reviewed and no updates needed. I have reviewed the lab findings, diagnostic data, medications, and the plan for sedation. I have determined this patient to be an appropriate candidate for the planned sedation and procedure and have reassessed the patient IMMEDIATELY PRIOR to sedation and procedure.    Philippe Holloway MD

## 2017-08-24 NOTE — PROGRESS NOTES
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 Admitted 7/4/17 from [facility] for evaluation and management of sepsis secondary to colitis, taken to OR for initial exploratory laparotomy with findings of typhlitis in the right colon, wound left open with wound vac in place for reexploration and interval closure on 7/5/2017. Concern for CMV colitis with low count CMV viremia/GI PTLD and subsequently underwent colonoscopy on 7/21, random biopsies obtained.  On 7/25/2017 patient became septic with abdominal compartment syndrome. CT noted new large heterogeneous right sided retroperitoneal gas and air tracking along duodenum and underwent a exploratory laparotomy, right angelique-colectomy, end ileostomy, mucus fistula, partial omentectomy on 7/26 by colorectal surgery.  Antibiotics discontinued 8/3. 8/6 became septic with increased abd pain, confusion. 8/9 right 20Fr retroperitoneal drain placed. Additional peritoneal 12F pigtail drain placed 8/15.    Graft Function: Good function. LFTs acceptable (checking every M, Th).   Immunosuppression Management:   CellCept discontinued (6/27/17) due to neutropenia.   Prograf goal decreased to ~5-6 due to sepsis. Level 10.6, decrease dose today.  Cardiorespiratory:   -Suspected RONNIE:  Uses O2 overnight only for brief apnea while sleeping per nursing.  -HTN: SBP 140s-160s. Amlodipine increased this week. Pt on daily Florinef daily for hyperkalemia. Continue lasix.   -R/o HCAP:  New productive cough.  Will check CXR.    Hematology: Pancytopenia present on admission, persists.  Contributing factors include medications and CMV infection.  -Anemia- Hemoglobin 7.3, stable.  Last blood transfusion on 8/18.  -Leukopenia/neutropenia- WBC 2, ANC 0.9.  Received GCSF 8/20, 8/22, 8/23.  -Thrombocytopenia-  Plt dropped with linezolid.  Now increasing.  GI:   -Neutropenic colitis on  admission. Colonoscopy 7/21. Biopsy: resolving injury secondary to possible drug induced vs infectious.   -Bowel perforation: 7/25 CT scan abd/pelvis-new large heterogeneous right sided retroperitoneal gas and air tracking along duodenum.  No contrast extravasation.  No intraperitoneal air noted. Colorectal surgery performed Ex lap, right angelique-colectomy, end ileostomy, mucus fistula, partial omentectomy on 7/26. Findings no neida perforation, phlegmon/inflammation right colon. Colon pathology suggested colon perforation, negative for malignancy; CMV stain positive.    -Retroperitoneal abscess/ascites peritonitis: CT A/P 8/9 showed a persistent gas/fluid collection RLQ, peritonitis, some free air but no evidence of contrast extravasation. 8/11: Diag. Para WBC 5,038, culture + clostridium (see below).  8/14 CT scan abd/pelvis: Complex gas-containing fluid collection in the right retroperitoneum decreased slightly in size, moderate ascites with intraperitoneal gas, peritonitis noted, no bowel leak.  8/23 CT:  Intra-abdominal fluid collections increased in size.  Re-consult IR for better drainage.  Antibiotic coverage as below.   -Diet:  NPO for possible procedure.  Nepro NJ feeds.  TF cycled overnight.  Vomiting, unclear etiology, possibly worsening infection.  -High output  ileostomy:  Goal ileostomy output ~ 1200 cc in 24 hrs. Stool watery today, volume increased. Continue loperamide 4mg QID and fiber BID. Add back Lomotil BID.   Fluid/Electrolytes/Renal:   -EJ: on admission, d/t ATN sec to sepsis. Now resolved, Cr < 1.  -Hypernatremia: resolved with free water.    -Hyperkalemia: Neph consulted earlier in hospitalization. 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  K 5.5 on lasix daily bicarb, and florinef.   Endocrine: DM type 2. Endocrine consulting for glycemic management.   Infectious disease: Afebrile.  WBC 2, ID following.  -Retroperitoneal abscess/peritonitis: CT C/A/P 8/9 showed a persistent gas/fluid collection RLQ, peritonitis,  some free air but no evidence of contrast extravasation.  8/11 paracentesis: WBC 5,038, drain cultures + clostridium septicum (day 6, broth only).  Repeat 8/15, WBC 15,680, culture NGTD.  8/23 CT:  Fluid collections slightly larger.  Retroperitoneal drain now occluded.  IR consulted for drain interrogation and possible new drain placement.  On zosyn (8/9-8/22), linezolid (8/9-8/15), switched to Augmentin (8/22-present).  -CMV viremia: Primary CMV infection.  (CMV R-D+) CMV colitis per pathology, peak serum 7/15 4225 IU/ml.   IV Ganciclovir (started 7/20).  CMV count fell to <137. Decreased Ganciclovir to 5mg/kg on 8/18.  Switched to valcyte 900mg daily 8/22, continue until 8/26.  Check CMV quant weekly until 9/24 and then every other week for 2 months.  -EBV viremia:  Peak serum 406,697 copies/ml on 7/18.   753 copies/ml on 8/15.  Check quant monthly, due 9/15.  -Cellulitis/dry gangrene:  Right 1st finger, on linezolid (8/19- 8/22), switched to doxycycline x7 days (8/22-present).  Clinically improved.  Resolved issues:  -Severe sepsis: On admission, secondary to right colon phlegmon. Meropenem/daptomycin/micafungin tx x 10 days completed on 8/3. S/p right angelique-colectomy.  Path: CMV stain +, perforation of colon noted.   Neuro: Toxic metabolic encephalopathy secondary to infection, prolonged hospital stay.  Generally improved.  Vascular:  Evidence of microvascular injury in digits (toes) this is most likely due to injury while patient was on pressor therapy with sepsis. Erythema right 1st finger documented and marked, now decreasing.   Activity: Deconditioned due to prolong hospital course. Daily PT/OT, encourage pt to be OOB to chair. Encourage pulmonary toilet.   Prophylaxis: DVT-Heparin SQ  Disposition: 7A.     Medical Decision Making: Medium    PILLO/Fellow/Resident Provider: Evette Pennington NP  [id number]    Faculty: Drew Dalal MD     __________________________________________________________________  Interval  "History:   Overnight events:  Feeling fatigued and \"sick\" today.  Will not elaborate.  Vomited this morning.  Some abd pain.    ROS:   A 10-point review of systems was negative except as noted above.    Meds:    diphenoxylate-atropine  5 mL Oral BID     tacrolimus  5 mg Oral BID IS     insulin aspart  1-12 Units Subcutaneous 5x Daily     furosemide  20 mg Oral Daily     pantoprazole  40 mg Oral Daily     amoxicillin-clavulanate  875 mg Oral BID     valGANciclovir  900 mg Oral Daily     doxycycline  100 mg Oral BID     filgrastim (NEUPOGEN/GRANIX) intravenous  5 mcg/kg (Dosing Weight) Intravenous Daily at 8 pm     amLODIPine  10 mg Oral Daily     protein modular  1 packet Per Feeding Tube Daily     insulin isophane human  20 Units Subcutaneous QPM     insulin glargine  20 Units Subcutaneous QAM     insulin aspart   Subcutaneous TID w/meals     artificial saliva  15 mL Swish & Spit 4x Daily     heparin  5,000 Units Subcutaneous Q8H     fludrocortisone  0.1 mg Oral Daily     sodium chloride (PF)  10 mL Irrigation Q8H     fiber modular  1 packet Per Feeding Tube BID     sodium chloride (PF)  20 mL Irrigation Q6H     saccharomyces boulardii  250 mg Oral BID     sodium bicarbonate  1,300 mg Per NG tube TID     miconazole with skin protectant   Topical BID     loperamide  4 mg Oral or Feeding Tube 4x Daily     aspirin  80 mg Oral Daily     sodium chloride (PF)  3 mL Intracatheter Q8H       Physical Exam:     Admit Weight: 94.2 kg (207 lb 11.2 oz) (SCALE 2)    Current vitals:   /82 (BP Location: Left arm)  Pulse 93  Temp 98.6  F (37  C) (Oral)  Resp 18  Ht 1.829 m (6')  Wt 99.9 kg (220 lb 4.8 oz)  SpO2 91%  BMI 29.88 kg/m2    Vital sign ranges:    Temp:  [97.8  F (36.6  C)-99  F (37.2  C)] 98.6  F (37  C)  Pulse:  [92-95] 93  Heart Rate:  [92-95] 93  Resp:  [18-20] 18  BP: (123-167)/(79-95) 123/82  SpO2:  [90 %-97 %] 91 %  Patient Vitals for the past 24 hrs:   BP Temp Temp src Pulse Heart Rate Resp SpO2 "   08/24/17 1149 123/82 98.6  F (37  C) Oral 93 93 18 91 %   08/24/17 1100 - - - - - - 96 %   08/24/17 0916 149/87 98  F (36.7  C) Oral 92 92 18 97 %   08/24/17 0400 157/86 97.8  F (36.6  C) Oral - 93 20 96 %   08/24/17 0003 - - - - - - 96 %   08/24/17 0000 165/90 99  F (37.2  C) Oral 95 95 20 90 %   08/23/17 2100 (!) 167/95 98.9  F (37.2  C) Oral 95 95 20 93 %   08/23/17 1559 143/79 98.3  F (36.8  C) Oral - 93 20 96 %   08/23/17 1540 145/86 98.3  F (36.8  C) Oral - 93 20 96 %     General Appearance: NAD, laying in bed  Skin: normal, warm, dry  Heart: RRR  Lungs: Diminished bilateral, cough + thick sputum  Abdomen: soft, rounded, tender left side. Ileostomy with brown watery output. Mucus fistula covered. Midline incision, c/d/i.  Right abdominal Drains: both serosang.  Larger drain is occluded.  : Incontinent at times  Extremities: No edema. R index finger with necrotic blacked finger with surrounding erythema, decreased erythema overall.  Necrotic blackened areas on right 1st & 2nd toes and left 2nd toe.  Neurologic: A&O x 2-4, speech appropriate      Data:   CMP    Recent Labs  Lab 08/24/17  0636 08/23/17  0640  08/21/17  0649    139  < > 141   POTASSIUM 5.5* 5.2  < > 5.2   CHLORIDE 110* 110*  < > 113*   CO2 24 25  < > 23   * 139*  < > 129*   BUN 39* 45*  < > 48*   CR 0.90 0.92  < > 1.00   GFRESTIMATED 88 87  < > 78   GFRESTBLACK >90 >90  < > >90   JACOB 8.2* 8.1*  < > 8.6   MAG 1.9 1.7  < > 1.9   PHOS 4.1 3.2  < > 3.4   ALBUMIN 1.7*  --   --  1.6*   BILITOTAL 0.4  --   --  0.4   ALKPHOS 243*  --   --  245*   AST 34  --   --  39   ALT 21  --   --  24   < > = values in this interval not displayed.  CBC    Recent Labs  Lab 08/24/17  0636 08/23/17  0640   HGB 7.3* 7.1*   WBC 2.0* 1.8*   * 102*     COAGS  No lab results found in last 7 days.    Invalid input(s): XA   Urinalysis  Recent Labs   Lab Test  08/10/17   1752  08/08/17   1600   06/14/17   1508   04/11/16   1345   COLOR  Yellow  Yellow   <  >   --    < >  Yellow   APPEARANCE  Slightly Cloudy  Slightly Cloudy   < >   --    < >  Clear   URINEGLC  Negative  Negative   < >   --    < >  30*   URINEBILI  Small   This is an unconfirmed screening test result. A positive result may be false.  *  Negative   < >   --    < >  Negative   URINEKETONE  Negative  Negative   < >   --    < >  Negative   SG  1.012  1.010   < >   --    < >  1.016   UBLD  Negative  Negative   < >   --    < >  Small*   URINEPH  5.0  5.0   < >   --    < >  5.0   PROTEIN  30*  30*   < >   --    < >  30*   NITRITE  Negative  Negative   < >   --    < >  Negative   LEUKEST  Negative  Negative   < >   --    < >  Negative   RBCU  0  3*   < >   --    < >  1   WBCU  10*  7*   < >   --    < >  1   UTPG   --    --    --   1.55*   --   0.41*    < > = values in this interval not displayed.

## 2017-08-24 NOTE — PROVIDER NOTIFICATION
BG 64  Notified Evette Pennington NP. Received verbal (and later written) order to start D10 at 50mL/hr. Started before patient left floor and called IR to update and asked them to recheck BG in 15 min. IR to follow.

## 2017-08-24 NOTE — PROGRESS NOTES
"Transplant Social Work Services Progress Note      Date of Initial Social Work Evaluation: 8/1/2017  Collaborated with: Pts wife, [facility]    Data: PT/OT recommending TCU. A few weeks ago LTACH seemed more appropriate for pt so he was on [place]'s list which [facility] was coordinating, now TCU is more appropriate. Spoke with [facility] and pt is still on their list.  Intervention/Assessment: Spoke with pts wife to update her. Informed her that for a while it seemed pt was more appropriate for LTACH and now TCU seems to be a better fit. Talked with pts wife about how [facility] is likely the most appropriate option due to pt being a recent liver transplant. Pts wife in agreement with [facility] since it is close to pts doctors in case there were complications. Pts wife shared with SW that she has been encouraging pt to get up and walk around and participate in therapies so he doesn't have to go to TCU. Pt stated \"he thinks he might still have to go to TCU\" to wife. Pt and wife both in agreement with TCU. Pt will remain on [facility] list and SW will continue to coordinate with team and TCU about when pt is ready for discharge.  Education provided by SW: Educated pts wife on recommendations and [facility].  Plan:    Discharge Plans in Progress: Coordinating with [facility] and medical team.    Barriers to d/c plan: Medical stability.    Follow up Plan: SW to coordinate with team, pt, and TCU about discharge plan.    JOSEY Chowdhury, LG[facility]   Ph: [phone], Pager: [phone]      "

## 2017-08-24 NOTE — PLAN OF CARE
"Problem: Goal Outcome Summary  Goal: Goal Outcome Summary  OT 7A Pt limited by confusion/delirium and back pain.  VSS on room air with in room ADLs, transfers and hallway functional mobility @ 150ft. Pt needed 1 standing rest break due to fatigue.  Pt unable to participate in cognitive tasks due to distraction from pain and confusion as to who was in his room and where he was (he believed his nurse was a family member and location was \"[place]\").  Goals updated    Rec TCU to improve cognitive compensation, strength and activity tolerance for ADls and functional tranfers       "

## 2017-08-24 NOTE — PROGRESS NOTES
Social Work:    PT/OT recommending TCU, pt no longer meets criteria for LTACH which [facility] was looking in to and coordinating. Pt is still on [facility] waitlist.     SW left message for pts wife to touch base about new recommendations. Originally pts wife wanted SW to look into places in [facility]. Since pt is a transplant pt and has been very sick and is deconditioned the team would like him to go to [facility].    Awaiting call back from wife.    JOSEY Chowdhury, LGSW  7A   Ph: [phone], Pager: [phone]

## 2017-08-24 NOTE — CONSULTS
Patient is on IR schedule 8/24/2017 for a 1) sinogram and drain exchange of right RP drain 2) image guided placement of additional left abdominal drain.   Labs WNL for procedure.   Orders for NPO and antibiotics have been entered.   Medications to be held include: heparin SQ  Consent will be done prior to procedure.     Please contact the IR charge RN at [phone] for estimated time of procedure.     Case discussed with Dr. Holloway and Dr. Dacosta.    Annalisa Peña, ALMA, APRN  Interventional Radiology  Phone: [phone]  Pager: [phone]

## 2017-08-24 NOTE — PROGRESS NOTES
Calorie Counts  Intake recorded for: 8/23 Kcals: 1218  Protein: 45g  # Meals Recorded: 100% 16 oz skim milk, rice krispy bar, 16 oz orange juice, 50% baked potato chips, 25% ham sandwich     # Supplements Recorded: 100% 1 Ensure Plus, 100% 1 Ensure Clear

## 2017-08-24 NOTE — PROGRESS NOTES
Interventional Radiology Intra-procedural Nursing Note    Patient Name: Camacho Bhagat  Medical Record Number: [id number]  Today's Date: August 24, 2017    Start Time: 1650  End of procedure time: 1730  Procedure: drain placement and drain exchange  Report given to: Simi JEFFREY  Time pt departs:  1747      Other Notes: Pt from 7 a to IR 2. On arrival to IR room 5 accu check was done with a glucose reading of 63, 1 amp D50  Prn given for glucose less ana 70. Glucose recheck post D50 was 123. Pt positioned and prepped per procedure protocol. Fentanyl 200 mcg, versed 2 mg, lido 20  cc, ST 40 minutes. Accu check glucose 88 at 1735.    DELROY RODAS

## 2017-08-24 NOTE — PLAN OF CARE
Problem: Individualization  Goal: Patient Preferences  Outcome: Improving     RN:  AVSS, O2 sat 96% on 2L/NC, back/abdominal pain controlled with Oxycodone given x1, denies nausea. TF@75cc/hr, cycled 6pm to 8am, blood glucose 118 and 136, no SS given. Pigtail with small amount of serosanguineous output, right drain with zero output, irrigated with 20cc of NS. Adequate urine output patient using urinal, Ostomy with 450cc of loose watery stool. Patient resting between cares, will continue to monitor.

## 2017-08-24 NOTE — PLAN OF CARE
Problem: Goal Outcome Summary  Goal: Goal Outcome Summary  PT-7A: Cancel. Pt preparing for procedure upon initial arrival, schedule did not allow for check back.  Re-scheduled 8/25.

## 2017-08-25 ENCOUNTER — APPOINTMENT (OUTPATIENT)
Dept: PHYSICAL THERAPY | Facility: CLINIC | Age: 52
End: 2017-08-25
Attending: TRANSPLANT SURGERY
Payer: COMMERCIAL

## 2017-08-25 ENCOUNTER — APPOINTMENT (OUTPATIENT)
Dept: OCCUPATIONAL THERAPY | Facility: CLINIC | Age: 52
End: 2017-08-25
Attending: TRANSPLANT SURGERY
Payer: COMMERCIAL

## 2017-08-25 PROBLEM — K68.9: Status: ACTIVE | Noted: 2017-08-25

## 2017-08-25 PROBLEM — N17.9 AKI (ACUTE KIDNEY INJURY) (H): Status: ACTIVE | Noted: 2017-08-25

## 2017-08-25 PROBLEM — K68.19 RETROPERITONEAL ABSCESS (H): Status: ACTIVE | Noted: 2017-08-25

## 2017-08-25 PROBLEM — I99.8 ISCHEMIA OF BOTH LOWER EXTREMITIES: Status: ACTIVE | Noted: 2017-08-25

## 2017-08-25 PROBLEM — R41.0 DELIRIUM, ACUTE: Status: ACTIVE | Noted: 2017-08-25

## 2017-08-25 PROBLEM — D61.818 PANCYTOPENIA (H): Status: ACTIVE | Noted: 2017-08-25

## 2017-08-25 PROBLEM — N17.9 ACUTE KIDNEY INJURY (H): Status: RESOLVED | Noted: 2017-03-10 | Resolved: 2017-08-25

## 2017-08-25 PROBLEM — I96 GANGRENE OF FINGER (H): Status: ACTIVE | Noted: 2017-08-25

## 2017-08-25 PROBLEM — N25.89: Status: ACTIVE | Noted: 2017-08-25

## 2017-08-25 PROBLEM — R63.8 INADEQUATE ORAL INTAKE: Status: ACTIVE | Noted: 2017-08-25

## 2017-08-25 PROBLEM — K65.9: Status: ACTIVE | Noted: 2017-08-25

## 2017-08-25 LAB
ANION GAP SERPL CALCULATED.3IONS-SCNC: 4 MMOL/L (ref 3–14)
ANISOCYTOSIS BLD QL SMEAR: ABNORMAL
APPEARANCE FLD: NORMAL
BUN SERPL-MCNC: 38 MG/DL (ref 7–30)
CALCIUM SERPL-MCNC: 8.4 MG/DL (ref 8.5–10.1)
CHLORIDE SERPL-SCNC: 114 MMOL/L (ref 94–109)
CO2 SERPL-SCNC: 25 MMOL/L (ref 20–32)
COLOR FLD: NORMAL
CREAT SERPL-MCNC: 0.91 MG/DL (ref 0.66–1.25)
DIFFERENTIAL METHOD BLD: ABNORMAL
ERYTHROCYTE [DISTWIDTH] IN BLOOD BY AUTOMATED COUNT: 20.4 % (ref 10–15)
GFR SERPL CREATININE-BSD FRML MDRD: 87 ML/MIN/1.7M2
GLUCOSE BLDC GLUCOMTR-MCNC: 106 MG/DL (ref 70–99)
GLUCOSE BLDC GLUCOMTR-MCNC: 118 MG/DL (ref 70–99)
GLUCOSE BLDC GLUCOMTR-MCNC: 180 MG/DL (ref 70–99)
GLUCOSE BLDC GLUCOMTR-MCNC: 224 MG/DL (ref 70–99)
GLUCOSE BLDC GLUCOMTR-MCNC: 299 MG/DL (ref 70–99)
GLUCOSE BLDC GLUCOMTR-MCNC: 73 MG/DL (ref 70–99)
GLUCOSE SERPL-MCNC: 111 MG/DL (ref 70–99)
GRAM STN SPEC: NORMAL
GRAM STN SPEC: NORMAL
HCT VFR BLD AUTO: 22.6 % (ref 40–53)
HGB BLD-MCNC: 7.3 G/DL (ref 13.3–17.7)
INTERPRETATION ECG - MUSE: NORMAL
LACTATE BLD-SCNC: 1.1 MMOL/L (ref 0.7–2)
LYMPHOCYTES # BLD AUTO: 0.9 10E9/L (ref 0.8–5.3)
LYMPHOCYTES NFR BLD AUTO: 37 %
MAGNESIUM SERPL-MCNC: 1.8 MG/DL (ref 1.6–2.3)
MCH RBC QN AUTO: 30.3 PG (ref 26.5–33)
MCHC RBC AUTO-ENTMCNC: 32.3 G/DL (ref 31.5–36.5)
MCV RBC AUTO: 94 FL (ref 78–100)
METAMYELOCYTES # BLD: 0 10E9/L
METAMYELOCYTES NFR BLD MANUAL: 1 %
MONOCYTES # BLD AUTO: 0.5 10E9/L (ref 0–1.3)
MONOCYTES NFR BLD AUTO: 22 %
NEUTROPHILS # BLD AUTO: 1 10E9/L (ref 1.6–8.3)
NEUTROPHILS NFR BLD AUTO: 40 %
OVALOCYTES BLD QL SMEAR: SLIGHT
PHOSPHATE SERPL-MCNC: 4.3 MG/DL (ref 2.5–4.5)
PLATELET # BLD AUTO: 101 10E9/L (ref 150–450)
POTASSIUM SERPL-SCNC: 5.5 MMOL/L (ref 3.4–5.3)
RBC # BLD AUTO: 2.41 10E12/L (ref 4.4–5.9)
RBC # FLD: NORMAL /UL
SODIUM SERPL-SCNC: 143 MMOL/L (ref 133–144)
SPECIMEN SOURCE FLD: NORMAL
SPECIMEN SOURCE: NORMAL
TACROLIMUS BLD-MCNC: 13.3 UG/L (ref 5–15)
TME LAST DOSE: NORMAL H
WBC # BLD AUTO: 2.4 10E9/L (ref 4–11)
WBC # FLD AUTO: NORMAL /UL

## 2017-08-25 PROCEDURE — 27210913 ZZH NUTRITION PRODUCT INTERMEDIATE PACKET

## 2017-08-25 PROCEDURE — 83735 ASSAY OF MAGNESIUM: CPT | Performed by: STUDENT IN AN ORGANIZED HEALTH CARE EDUCATION/TRAINING PROGRAM

## 2017-08-25 PROCEDURE — 94640 AIRWAY INHALATION TREATMENT: CPT

## 2017-08-25 PROCEDURE — 87070 CULTURE OTHR SPECIMN AEROBIC: CPT | Performed by: NURSE PRACTITIONER

## 2017-08-25 PROCEDURE — 25000132 ZZH RX MED GY IP 250 OP 250 PS 637: Performed by: COLON & RECTAL SURGERY

## 2017-08-25 PROCEDURE — 25000132 ZZH RX MED GY IP 250 OP 250 PS 637: Performed by: NURSE PRACTITIONER

## 2017-08-25 PROCEDURE — 40000275 ZZH STATISTIC RCP TIME EA 10 MIN

## 2017-08-25 PROCEDURE — 83605 ASSAY OF LACTIC ACID: CPT | Performed by: NURSE PRACTITIONER

## 2017-08-25 PROCEDURE — 80197 ASSAY OF TACROLIMUS: CPT | Performed by: STUDENT IN AN ORGANIZED HEALTH CARE EDUCATION/TRAINING PROGRAM

## 2017-08-25 PROCEDURE — 25000132 ZZH RX MED GY IP 250 OP 250 PS 637: Performed by: STUDENT IN AN ORGANIZED HEALTH CARE EDUCATION/TRAINING PROGRAM

## 2017-08-25 PROCEDURE — 80048 BASIC METABOLIC PNL TOTAL CA: CPT | Performed by: STUDENT IN AN ORGANIZED HEALTH CARE EDUCATION/TRAINING PROGRAM

## 2017-08-25 PROCEDURE — 40000133 ZZH STATISTIC OT WARD VISIT: Performed by: OCCUPATIONAL THERAPIST

## 2017-08-25 PROCEDURE — 25000128 H RX IP 250 OP 636: Performed by: NURSE PRACTITIONER

## 2017-08-25 PROCEDURE — 25000128 H RX IP 250 OP 636: Performed by: PHYSICIAN ASSISTANT

## 2017-08-25 PROCEDURE — 97530 THERAPEUTIC ACTIVITIES: CPT | Mod: GP

## 2017-08-25 PROCEDURE — 25000132 ZZH RX MED GY IP 250 OP 250 PS 637: Performed by: TRANSPLANT SURGERY

## 2017-08-25 PROCEDURE — 25000131 ZZH RX MED GY IP 250 OP 636 PS 637: Performed by: NURSE PRACTITIONER

## 2017-08-25 PROCEDURE — 94640 AIRWAY INHALATION TREATMENT: CPT | Mod: 76

## 2017-08-25 PROCEDURE — 25000125 ZZHC RX 250: Performed by: NURSE PRACTITIONER

## 2017-08-25 PROCEDURE — 25000125 ZZHC RX 250: Performed by: STUDENT IN AN ORGANIZED HEALTH CARE EDUCATION/TRAINING PROGRAM

## 2017-08-25 PROCEDURE — 25800025 ZZH RX 258: Performed by: SURGERY

## 2017-08-25 PROCEDURE — 97535 SELF CARE MNGMENT TRAINING: CPT | Mod: GO | Performed by: OCCUPATIONAL THERAPIST

## 2017-08-25 PROCEDURE — 25000132 ZZH RX MED GY IP 250 OP 250 PS 637: Performed by: PHYSICIAN ASSISTANT

## 2017-08-25 PROCEDURE — 27210432 ZZH NUTRITION PRODUCT RENAL BASIC LITER

## 2017-08-25 PROCEDURE — 84100 ASSAY OF PHOSPHORUS: CPT | Performed by: STUDENT IN AN ORGANIZED HEALTH CARE EDUCATION/TRAINING PROGRAM

## 2017-08-25 PROCEDURE — 97530 THERAPEUTIC ACTIVITIES: CPT | Mod: GO | Performed by: OCCUPATIONAL THERAPIST

## 2017-08-25 PROCEDURE — 87205 SMEAR GRAM STAIN: CPT | Performed by: NURSE PRACTITIONER

## 2017-08-25 PROCEDURE — 25000132 ZZH RX MED GY IP 250 OP 250 PS 637: Performed by: SURGERY

## 2017-08-25 PROCEDURE — 36592 COLLECT BLOOD FROM PICC: CPT | Performed by: STUDENT IN AN ORGANIZED HEALTH CARE EDUCATION/TRAINING PROGRAM

## 2017-08-25 PROCEDURE — 40000193 ZZH STATISTIC PT WARD VISIT

## 2017-08-25 PROCEDURE — 12000025 ZZH R&B TRANSPLANT INTERMEDIATE

## 2017-08-25 PROCEDURE — 40000802 ZZH SITE CHECK

## 2017-08-25 PROCEDURE — 89051 BODY FLUID CELL COUNT: CPT | Performed by: NURSE PRACTITIONER

## 2017-08-25 PROCEDURE — 00000146 ZZHCL STATISTIC GLUCOSE BY METER IP

## 2017-08-25 PROCEDURE — 87106 FUNGI IDENTIFICATION YEAST: CPT | Performed by: NURSE PRACTITIONER

## 2017-08-25 PROCEDURE — 97116 GAIT TRAINING THERAPY: CPT | Mod: GP

## 2017-08-25 PROCEDURE — 85025 COMPLETE CBC W/AUTO DIFF WBC: CPT | Performed by: STUDENT IN AN ORGANIZED HEALTH CARE EDUCATION/TRAINING PROGRAM

## 2017-08-25 RX ORDER — TACROLIMUS 1 MG/1
3 CAPSULE ORAL
Status: DISCONTINUED | OUTPATIENT
Start: 2017-08-25 | End: 2017-08-27

## 2017-08-25 RX ORDER — IPRATROPIUM BROMIDE AND ALBUTEROL SULFATE 2.5; .5 MG/3ML; MG/3ML
3 SOLUTION RESPIRATORY (INHALATION) 4 TIMES DAILY
Status: COMPLETED | OUTPATIENT
Start: 2017-08-25 | End: 2017-08-26

## 2017-08-25 RX ORDER — ACETYLCYSTEINE 200 MG/ML
2 SOLUTION ORAL; RESPIRATORY (INHALATION) 4 TIMES DAILY
Status: COMPLETED | OUTPATIENT
Start: 2017-08-25 | End: 2017-08-26

## 2017-08-25 RX ADMIN — LOPERAMIDE HYDROCHLORIDE 4 MG: 2 SOLUTION ORAL at 19:50

## 2017-08-25 RX ADMIN — Medication 15 ML: at 16:06

## 2017-08-25 RX ADMIN — SODIUM BICARBONATE 650 MG TABLET 1300 MG: at 10:05

## 2017-08-25 RX ADMIN — DOXYCYCLINE CALCIUM 100 MG: 50 SYRUP ORAL at 19:49

## 2017-08-25 RX ADMIN — Medication 1 PACKET: at 19:52

## 2017-08-25 RX ADMIN — Medication 5 ML: at 10:05

## 2017-08-25 RX ADMIN — Medication 1 PACKET: at 09:53

## 2017-08-25 RX ADMIN — FLUDROCORTISONE ACETATE 0.1 MG: 0.1 TABLET ORAL at 10:05

## 2017-08-25 RX ADMIN — OXYCODONE HYDROCHLORIDE 5 MG: 5 SOLUTION ORAL at 21:50

## 2017-08-25 RX ADMIN — DOXYCYCLINE CALCIUM 100 MG: 50 SYRUP ORAL at 09:48

## 2017-08-25 RX ADMIN — PANTOPRAZOLE SODIUM 40 MG: 40 TABLET, DELAYED RELEASE ORAL at 09:53

## 2017-08-25 RX ADMIN — IPRATROPIUM BROMIDE AND ALBUTEROL SULFATE 3 ML: .5; 3 SOLUTION RESPIRATORY (INHALATION) at 21:02

## 2017-08-25 RX ADMIN — TACROLIMUS 3 MG: 1 CAPSULE ORAL at 18:45

## 2017-08-25 RX ADMIN — IPRATROPIUM BROMIDE AND ALBUTEROL SULFATE 3 ML: .5; 3 SOLUTION RESPIRATORY (INHALATION) at 09:37

## 2017-08-25 RX ADMIN — TACROLIMUS 3 MG: 1 CAPSULE ORAL at 10:18

## 2017-08-25 RX ADMIN — LOPERAMIDE HYDROCHLORIDE 4 MG: 2 SOLUTION ORAL at 09:51

## 2017-08-25 RX ADMIN — VALGANCICLOVIR HYDROCHLORIDE 900 MG: 50 POWDER, FOR SOLUTION ORAL at 09:55

## 2017-08-25 RX ADMIN — AMLODIPINE BESYLATE 10 MG: 10 TABLET ORAL at 09:41

## 2017-08-25 RX ADMIN — Medication 250 MG: at 19:50

## 2017-08-25 RX ADMIN — HEPARIN SODIUM 5000 UNITS: 5000 INJECTION, SOLUTION INTRAVENOUS; SUBCUTANEOUS at 16:03

## 2017-08-25 RX ADMIN — FILGRASTIM 480 MCG: 480 INJECTION, SOLUTION INTRAVENOUS; SUBCUTANEOUS at 19:52

## 2017-08-25 RX ADMIN — Medication 2 G: at 16:04

## 2017-08-25 RX ADMIN — ACETYLCYSTEINE 2 ML: 200 SOLUTION ORAL; RESPIRATORY (INHALATION) at 16:19

## 2017-08-25 RX ADMIN — HEPARIN SODIUM 5000 UNITS: 5000 INJECTION, SOLUTION INTRAVENOUS; SUBCUTANEOUS at 21:33

## 2017-08-25 RX ADMIN — Medication 250 MG: at 10:05

## 2017-08-25 RX ADMIN — IPRATROPIUM BROMIDE AND ALBUTEROL SULFATE 3 ML: .5; 3 SOLUTION RESPIRATORY (INHALATION) at 16:18

## 2017-08-25 RX ADMIN — AMOXICILLIN AND CLAVULANATE POTASSIUM 875 MG: 400; 57 POWDER, FOR SUSPENSION ORAL at 19:49

## 2017-08-25 RX ADMIN — ACETYLCYSTEINE 2 ML: 200 SOLUTION ORAL; RESPIRATORY (INHALATION) at 12:48

## 2017-08-25 RX ADMIN — ACETYLCYSTEINE 2 ML: 200 SOLUTION ORAL; RESPIRATORY (INHALATION) at 09:37

## 2017-08-25 RX ADMIN — AMOXICILLIN AND CLAVULANATE POTASSIUM 875 MG: 400; 57 POWDER, FOR SUSPENSION ORAL at 09:44

## 2017-08-25 RX ADMIN — Medication 1 PACKET: at 09:49

## 2017-08-25 RX ADMIN — ACETYLCYSTEINE 2 ML: 200 SOLUTION ORAL; RESPIRATORY (INHALATION) at 21:02

## 2017-08-25 RX ADMIN — SODIUM BICARBONATE 650 MG TABLET 1300 MG: at 19:50

## 2017-08-25 RX ADMIN — Medication 5 ML: at 19:49

## 2017-08-25 RX ADMIN — SODIUM BICARBONATE 650 MG TABLET 1300 MG: at 16:03

## 2017-08-25 RX ADMIN — Medication 25 ML: at 04:46

## 2017-08-25 RX ADMIN — HEPARIN SODIUM 5000 UNITS: 5000 INJECTION, SOLUTION INTRAVENOUS; SUBCUTANEOUS at 05:07

## 2017-08-25 RX ADMIN — OXYCODONE HYDROCHLORIDE 5 MG: 5 SOLUTION ORAL at 10:14

## 2017-08-25 RX ADMIN — Medication 15 ML: at 19:51

## 2017-08-25 RX ADMIN — LOPERAMIDE HYDROCHLORIDE 4 MG: 2 SOLUTION ORAL at 12:46

## 2017-08-25 RX ADMIN — ONDANSETRON 4 MG: 2 INJECTION INTRAMUSCULAR; INTRAVENOUS at 21:39

## 2017-08-25 RX ADMIN — FUROSEMIDE 20 MG: 10 SOLUTION ORAL at 09:51

## 2017-08-25 RX ADMIN — Medication 80 MG: at 09:45

## 2017-08-25 RX ADMIN — LOPERAMIDE HYDROCHLORIDE 4 MG: 2 SOLUTION ORAL at 16:04

## 2017-08-25 RX ADMIN — IPRATROPIUM BROMIDE AND ALBUTEROL SULFATE 3 ML: .5; 3 SOLUTION RESPIRATORY (INHALATION) at 12:48

## 2017-08-25 NOTE — PLAN OF CARE
Problem: Goal Outcome Summary  Goal: Goal Outcome Summary  OT 7A Pt with increased activity tolerance although poor self awareness and memory increasing fall risk.  Pt with poor retention of education of log roll from prior sessions, completed bed mobility impulsivly with reported pain and strain to abdomen.  Limited by cognition and pain.  Rec TCU to improve cognitive compensation strength and activity tolerance for safe ADLs and functional transfers

## 2017-08-25 NOTE — PLAN OF CARE
Problem: Goal Outcome Summary  Goal: Goal Outcome Summary  PT-7A: Fair tolerance for therapy this day, deficits towards independence with functional mobility include impaired cognition, weakness, decreased activity tolerance.  Demonstrating improved log roll technique with lying->sitting transfer however not with sitting->lying.  Transfers with CGA.  Amb ~ 130' + 20' with FWW.     Recommend discharge to TCU.

## 2017-08-25 NOTE — PLAN OF CARE
Problem: Individualization  Goal: Patient Preferences  Outcome: No Change     RN:  AVSS, abdominal and back pain tolerable, no pain medication given during the shift. Patient had drain exchanged and placement yesterday. Right pigtail 12FR with small amount amount of serosanguinous output, right 24FR drain with small amount of serous output and right pigtail 12FR with moderate amount of serosanguinous output, all drains being irrigated with NS. TF @75cc/hr cycled x 12hours, 8pm to 8am, blood glucose 's, D50%W 50cc given, and patient drank 2 cans of Ensure, last , no SS given, Humulin given at PM shift. Adequate urine output, using urinal, Ileostomy with 500cc of loose/watery stool. Patient resting between cares, will continue to monitor.

## 2017-08-25 NOTE — PROGRESS NOTES
Diabetes Consult Daily  Progress Note          Assessment/Plan:   Camacho Bhagat is a 52 year old man with type 2 diabetes, ESLD due to CASTAÑEDA s/p DD OLT 3/4/17, admitted 7/4/17 from [facility] ED for evaluation and management of sepsis secondary to colitis, s/p exploratory laparotomy with findings of typhlitis in the right colon and ongoing concern for CMV colitis.  Now s/p ex lap with R hemicolectomy, end ileostomy, mucus fistula, partial omenectomy on 7/26.  Protracted recovery with recent concerns for infection, bowel perforation.      Hypoglycemic day and night yesterday likely due to no po intake.  Today hyperglycemic with same daytime insulin doses b/c he missed carb coverage.    Plan:    -continue glargine 20 units in the morning  -NPH decreased to 16 units qPM for TF.- due to overnight hypoglycemia  -meal aspart 1unit/10g CHO ordered for meals and snacks-- will request that RN staff keep close eye on intake as pt seems to be forgetting to notify of carb consumption.  -correction aspart: high intensity, qAC and overnight during TF.  -monitor glucose q4h-6h.    Will continue to follow.    Please notify diabetes team of changes planned for nutrition support schedule.     Outpatient diabetes follow up: Dr. Eckert at [facility]               Interval History:   8/7/17 Pt upset about inpatient diabetes mgmt consult.  Transplant contacted pt's outpatient endocrinologist Dr. Eckert of [facility]-- no issue with inpatient team managing pt's glucose    Nepro to 75 cc/h x 14 h [id number]- well-tolerated.    Camacho had some N/V yesterday leading to decreased po intake- then NPO (?) for drain adjustment in IR.  GLucoses trended low with no po intake- required some dextrose fluids to run yesterday afternoon and then overnight he got D50 and 2 cans of ensure.  Now today with same dose of daytime insulin he is hyperglycemic b/c he missed aspart for carbs this am.  He  told me he got aspart for orange, grapefruit, milk-- but none recorded and glucose up to 299 midday.        Recent Labs  Lab 08/25/17  1202 08/25/17  0822 08/25/17  0540 08/25/17  0509 08/25/17  0441 08/24/17  2043 08/24/17  1924  08/24/17  0636  08/23/17  0640  08/22/17  0640  08/21/17  0649  08/20/17  1640   GLC  --   --  111*  --   --   --   --   --  135*  --  139*  --  200*  --  129*  --  218*   * 180*  --  118* 73 108* 76  < >  --   < >  --   < >  --   < >  --   < >  --    < > = values in this interval not displayed.            Review of Systems:   See interval hx          Medications:   PTA taking Januvia and glargine versus glargine and aspart per carb    Active Diet Order      3 Gram K Diet     Physical Exam:  Gen: alert, resting in bed, NAD.  HEENT: mucous membranes moist, NJ feeding tube in place  Resp: normal, on RA  Neuro: answering appropriately    /85 (BP Location: Left arm)  Pulse 98  Temp 98.1  F (36.7  C) (Oral)  Resp 16  Ht 1.829 m (6')  Wt 98.9 kg (218 lb)  SpO2 91%  BMI 29.57 kg/m2           Data:     Lab Results   Component Value Date    A1C  07/06/2017     Canceled, Test credited   Below Assay Range  NOTIFIED LEONARD ONEILL AT 0538 ON 7/6/17 BY CHRISSY      A1C 9.4 02/16/2017    A1C 6.2 12/14/2016    A1C 6.1 10/27/2016    A1C 8.3 07/02/2012            Recent Labs   Lab Test  08/25/17   0540  08/24/17   0636   NA  143  139   POTASSIUM  5.5*  5.5*   CHLORIDE  114*  110*   CO2  25  24   ANIONGAP  4  4   GLC  111*  135*   BUN  38*  39*   CR  0.91  0.90   JACOB  8.4*  8.2*     CBC RESULTS:   Recent Labs   Lab Test  08/25/17   0540   WBC  2.4*   RBC  2.41*   HGB  7.3*   HCT  22.6*   MCV  94   MCH  30.3   MCHC  32.3   RDW  20.4*   PLT  101*     Giovana Villasenor PA-C [phone]  Diabetes Management job code [id number]

## 2017-08-25 NOTE — PROGRESS NOTES
[facility]    Transplant Infectious Diseases Inpatient Progress note      Camacho Bhagat MRN# [id number]   YOB: 1965 Age: 51 year old   Date of Admission: 7/4/2017  4:45 AM  Transplant: 3/4/2017 (Liver), Postoperative day: 174            Recommendations:   1. Continue PO VGCV 900 mg daily till 8/26/2017 to finish 2 week course of maintenance therapy.   3. Continue to check CMV PCR weekly till the 6 month post OLT yokasta (on 9/4/2017) then may go to every other week or even monthly for couple of more months.   - next CMV PCR on 8/24/2017.   4. Would continue to give G-CSF till ANC is > than 800.   5. Continue augmentin 875 mg bid till 9/6/2017 (total of 4 weeks of zosyn then augmentin).   - that may change depending on the results of the retroperitoneal/peritoneal fluid cx.   7. Continue doxycycline 100 mg bid till 8/29/2017 (total of 10 days).   8. Continue to monitor EBV monthly.      Dr. Villegas is available over the weekend for questions, I will resume the patient's care on Monday.         Summary of Presentation:   Transplants:  3/4/2017 (Liver), Postoperative day:  174     This patient is a 51 year old male with CASTAÑEDA s/p OLT in 3/2017. Basilixima for induction.   MMF was held due to neutropenia and multiple infections upon admission. TAC was held 7/25/2017 for perforated viscus.      Presented with colitis, s/p exploratory laparotomy. Attributed to neutropenic colitis.   Then started to develop CMV viremia (primary infection)  Course complicated with colon perforation, intra-abdominal abscess, with or without CMV colitis.          Active Problems and Infectious Diseases Issues:   1. CMV viremia (donor derived).   With or without colitis.   The resected colon on 7/26/2017 showed perforation but only 3 cells which were positive for CMV staining. This is less likely to be CMV colitis but is being treated as such with induction dose of IV GCV from 7/20/2017 till 8/11/2017 (3  weeks) then switched to maintenance dose on 8/12/2017. We should be able to finish the maintenance 2 week course on 8/26/2017.   Then monitor CMV PCR periodically as described int he recommendations section.     2. Leukopenia/neutropenia.   Likely drug induced (GCV/VGCV).   Has been getting G-CSF as needed last dose 8/25/2017. Will continue to support with G-CSF till ANC is > than 800.   Hopefully moving from GCV to VGCV then stopping VGCV on 8/26/2017 will help with leukopenia/neutropenia.   Bactrim DCed again 8/17/2017 and the patient was given pentamidine instead.   The patient also has EBV viremia and PTLD is always possible but that should be entertained if WBC/ANC do not improve after the discontinuation of VGCV and/or if EBV viral load is rising.     3. Colon perforation   4. Intraabdominal abscess  Zosyn from 8/9/2017 till 8/22/2017, then augmentin 8/22/2017 till present.   Whether the patient truly has CMV colitis is not clear but is being treated as such.   On 8/24/2017 repeat CT done to follow up on retroperitoneal abscess detected increasing size of the abscess and new fluid collection in the peritoneum, the preexisting drain in the retroperitoneal collection was replaced due to occlusion and new drain placed in peritoneal fluid collection.   It is not clear whether the patient still has microperforation or whether that was only due to occluded drain.   Will continue total of 4 weeks of ABx (till 9/6/2017) but this is subject to change pending new cx sent 8/24/2017.     5. Erythema of the index.   I think this is part of the ischemic process involving the right index, whether it's going to be progressed into dry ischemia or wet ischemia is not clear. The patient was started on linezolid on 8/19/2017 empirically the switched to doxycycline on 8/22/2017 for total therapy course of 10 days (till 8/29/2017).   It is overall improving.     6. EBV primary infection (donor derived)  Subsiding.   May check  monthly.     Other Infectious Disease issues include  - PCP prophylaxis: bactrim DCed in June of 2017 due to neutropenia. Resumed briefly to be DCed again on 8/17/2017 due to leukopenia. The patient received pentamidine on 8/17/2017.   - Serostatus: CMV D+/R-, EBV D+/R-, HSV1?/2?, VZV ?    Now he's on GCV IV.   - Immunization status: up-to-date  - Gamma globulin status: >1660 as of 7/24/2017.       Attestation:  I interviewed the patient and obtained history from the patient, and by reviewing the patient's chart including outside records, microbiological data, and radiological data. All data are summarized in this notes.  Naseem Figueroa   Pager: [phone]  08/25/2017        Interim History:  Event of replacing the retroperitoneal drain on 8/24/2017 due to occlusion and placing new drain in new peritoneal fluid collection noted.     HPI:  In summary:  CASTAÑEDA s/p OLT in 3/2017.   Presented with abdominal pain and underwent exploratory laparotomy which was not c/w with ischemia. It was felt to be related to neutropenia.   The patient has neutropenia since June of 2017 attributed to immunosuppressive therapy/bactrim/VGCV. Due to neutropenia, bactrim DCed June of 2017 and VGCV was DCed at unknown tome.     The patient was started on broad spectrum ABx with persistent fever.   Bronchoscopy done 7/12/2017 grew single colony of VRE which was considered a colonizer.   Ascitic fluid checked on 7/5/2017 and grew S capitis considered a contaminant.   Repeat BAL on 7/15/2017 and repeat paracentesis on 7/7/14/2017 were unremarkable for growth but the ascitic fluid was exudative.     The course was complicated by thrombosis of the right radial artery with ischemia to to the tip of the right index finger.   He also has ischemia to the right hallux and second toe.     The patient finished a course of ertapenem for colitis/fever/sepsis, his mental status started to improve.   CMV PCR became detected at low level and workup for  possible CMV colitis was initiated. Colonoscopy done 7/21/2017 was with poor prep and random biopsies were negative for colitis including CMV colitis. GCV was initiated on 7/20/2017 (repeat CMV PCR was 3.3 log on 7/20/2017, a 0.3 log increase from CMV PCR done on 7/18/2017).     On 7/25/2017 he suffered from acute respiratory distress and became obtunded with distended abdomen. He was found to have new free air and intra-abdominal fluid collection.    On 7/26/2017 he underwent exp lap with right hemicolectomy, end ileostomy, and mucus fistula. The pathology showed perforation with only 3 cells positive for CMV staining.   GCV IV was continued with broad spectrum ABx including meropenem/daptomycin/micafungin till around 8/3/2017.    IV GCV was switched to maintenance dose on 8/12/2017 after 2 values of CMV VL <137 IU/mL.     The patient continued to have intermittent fevers and altered mentation with ID concerned about persistent fistula. On 8/9/2017 the patient underwent CT guided drainage of retroperitoneal fluid collection which was a very difficult procedure. The fluid was described as serosanguinous but grew Cl septicum. The patient was started on linezolid/zosyn on 8/9/2017. On 8/14/2017 another CT showed persistent free air and and peritonitis. A new drain was placed on 8/15/2017 that showed inflammatory process with WBC of >15,000 but with negative cx including anaerobic cx.   Worthwhile to mention that in between the patient also underwent paracentesis on 8/11/2017 with WBC of 5000 and positive cx for Cl septicum and negative fungal cx.     The patient was continued on zosyn, linezolid was DCed on 8/15/2017 to be resumed again on 8/19/2017 for cellulitis of the right finger which sustained (among other fingers) ischemic injury during the hospital course.     The patient did have N/V yesterday but today he has no N/V and no other new complaints. No major events to report. No abdominal pain.       ROS:  As  mentioned in the interim history otherwise negative by reviewing central and peripheral neurological systems, respiratory system, cardiac system, GI system,  system, musculoskeletal, skin, allergy, and lymphatics.                  Pysical Examination:  Temp: 98.4  F (36.9  C) Temp src: Oral BP: 156/84 Pulse: 97 Heart Rate: 94 Resp: 16 SpO2: 93 % O2 Device: None (Room air) Oxygen Delivery: 2 LPM  Vitals:    08/20/17 0900 08/21/17 0800 08/22/17 1100 08/23/17 0900   Weight: 99.8 kg (220 lb 1.6 oz) 100.7 kg (222 lb) 102.1 kg (225 lb 1.6 oz) 99.9 kg (220 lb 4.8 oz)    08/25/17 0856   Weight: 98.9 kg (218 lb)     Constitutional: lying in bed in no apparent distress, answers questions appropriately and follows commands.    Abdomen: distended, soft but tender in the RUQ, no masses, no bruit, normal BS, midline wound is intact with no dehiscence or erythema and no drainage, RLQ ileostomy and proximal midline wound with mucus fistula and LLQ drain.   Extremities: no pitting edema of bilateral lower extremities, ischemic right hallux and right second toe, also ischemic tip of left second toe, and right index with mild blanching erythema, no ulcers.        Medications:  Medications that Require Transfusion:     dextrose 50 mL/hr at 08/24/17 1607     IV fluid REPLACEMENT ONLY Stopped (08/22/17 1200)     NaCl Stopped (08/22/17 0900)   Scheduled Medications:     menthol   Transdermal Q8H     ipratropium - albuterol 0.5 mg/2.5 mg/3 mL  3 mL Nebulization 4x Daily     acetylcysteine  2 mL Nebulization 4x Daily     insulin isophane human  16 Units Subcutaneous QPM     tacrolimus  3 mg Oral BID IS     diphenoxylate-atropine  5 mL Oral BID     insulin aspart  1-12 Units Subcutaneous 5x Daily     furosemide  20 mg Oral Daily     pantoprazole  40 mg Oral Daily     amoxicillin-clavulanate  875 mg Oral BID     valGANciclovir  900 mg Oral Daily     doxycycline  100 mg Oral BID     filgrastim (NEUPOGEN/GRANIX) intravenous  5 mcg/kg (Dosing  Weight) Intravenous Daily at 8 pm     amLODIPine  10 mg Oral Daily     protein modular  1 packet Per Feeding Tube Daily     insulin glargine  20 Units Subcutaneous QAM     insulin aspart   Subcutaneous TID w/meals     artificial saliva  15 mL Swish & Spit 4x Daily     heparin  5,000 Units Subcutaneous Q8H     fludrocortisone  0.1 mg Oral Daily     sodium chloride (PF)  10 mL Irrigation Q8H     fiber modular  1 packet Per Feeding Tube BID     sodium chloride (PF)  20 mL Irrigation Q6H     saccharomyces boulardii  250 mg Oral BID     sodium bicarbonate  1,300 mg Per NG tube TID     miconazole with skin protectant   Topical BID     loperamide  4 mg Oral or Feeding Tube 4x Daily     aspirin  80 mg Oral Daily     sodium chloride (PF)  3 mL Intracatheter Q8H       Laboratory Data:   No results found for: ACD4    Inflammatory Markers    Recent Labs   Lab Test  08/16/17   0650  08/09/17   0711  08/07/17   0549  08/06/17   0633  08/05/17   0616  07/05/17   1810  03/05/17   0358  11/23/16   0813   08/15/11   1151  04/11/11   1209  01/03/11   1246  02/15/10   1232   SED   --    --    --    --    --    --    --   45*   --   11  14  17*  25*   CRP  140.0*  190.0*  130.0*  130.0*  140.0*  354.0  20.0*  58.0*   < >  11.1*  9.0*  11.7*  17.5*    < > = values in this interval not displayed.       Immune Globulin Studies     Recent Labs   Lab Test  07/24/17   0705  08/15/11   1243   IGG  1660*  1160   IGG1   --   734   IGG2   --   294   IGG3   --   7*   IGG4   --   59       Metabolic Studies       Recent Labs   Lab Test  08/25/17   0540  08/24/17   0636  08/23/17   0640  08/22/17   1044  08/22/17   0640  08/21/17   0649  08/20/17   2143  08/20/17   1640   08/01/17   0651   07/25/17   0945   07/06/17   0316   NA  143  139  139   --   140  141   --   141   < >  150*   < >  137   < >  143   POTASSIUM  5.5*  5.5*  5.2   --   5.1  5.2  5.1  5.5*   < >  4.3   < >  4.0   < >  5.0   CHLORIDE  114*  110*  110*   --   111*  113*   --   115*    < >  123*   < >  104   < >  116*   CO2  25  24  25   --   24  23   --   21   < >  20   < >  26   < >  18*   ANIONGAP  4  4  4   --   5  5   --   6   < >  7   < >  7   < >  9   BUN  38*  39*  45*   --   45*  48*   --   48*   < >  51*   < >  77*   < >  62*   CR  0.91  0.90  0.92   --   0.98  1.00   --   0.98   < >  0.90   < >  2.60*   < >  2.75*   GFRESTIMATED  87  88  87   --   80  78   --   80   < >  89   < >  26*   < >  24*   GLC  111*  135*  139*   --   200*  129*   --   218*   < >  141*   < >  382*   < >  139*   A1C   --    --    --    --    --    --    --    --    --    --    --    --    --   Canceled, Test credited   Below Assay Range  NOTIFIED LEONARD ONEILL AT 0538 ON 7/6/17 BY EAANTOLIN     JAOCB  8.4*  8.2*  8.1*   --   8.2*  8.6   --   8.1*   < >  8.1*   < >  7.6*   < >  6.9*   PHOS  4.3  4.1  3.2   --   3.7  3.4   --   3.9   < >  3.1   < >   --    < >  5.5*   MAG  1.8  1.9  1.7   --   2.0  1.9   --    --    < >  1.9   < >   --    < >  2.1   LACT   --    --    --   0.8   --    --    --   0.7   < >   --    < >   --    < >  1.5   CKT   --    --    --    --    --    --    --    --    --   16*   --   40   --    --     < > = values in this interval not displayed.       Hepatic Studies    Recent Labs   Lab Test  08/24/17   0636  08/21/17   0649  08/20/17   1640  08/18/17   0538  08/17/17   0632  08/14/17   0621  08/10/17   1323  08/10/17   0704   07/25/17   0017   07/11/17   0328   BILITOTAL  0.4  0.4   --   0.5  0.6  0.7   --   2.2*   < >   --    < >  0.5   ALKPHOS  243*  245*   --   236*  254*  271*   --   136   < >   --    < >  158*   ALBUMIN  1.7*  1.6*  1.6*  1.7*  1.7*  1.8*   --   1.9*   < >   --    < >  1.8*   AST  34  39   --   32  33  24   --   36   < >   --    < >  34   ALT  21  24   --   21  21  17   --   26   < >   --    < >  27   LDH   --    --    --    --    --    --   168   --    --   258*   --    --    GGT   --    --    --    --    --    --    --    --    --    --    --   58    < > = values in this  interval not displayed.       Pancreatitis testing    Recent Labs   Lab Test  08/16/17   0650  07/24/17   0705  07/17/17   0630  07/12/17   0342  07/10/17   0340  07/05/17   0346  03/05/17   0358  03/03/17   1458  02/21/17   1520  12/09/16   1159  02/08/16   1144   09/30/10   1734   AMYLASE   --    --    --    --    --    --   53  70   --    --    --    --   82   LIPASE  55*   --    --    --    --    --   216   --   326  161   --    --   138   TRIG   --   303*  168*  114  177*  174*   --    --    --    --   99   < >   --     < > = values in this interval not displayed.       Hematology Studies      Recent Labs   Lab Test  08/25/17   0540  08/24/17   0636  08/23/17   0640  08/22/17   1044  08/22/17   0640  08/21/17   0649  08/20/17   0639  08/19/17   0904   WBC  2.4*  2.0*  1.8*   --   1.3*  1.7*  1.5*  2.1*   ANEU  1.0*  0.9*  0.7*   --   0.6*  0.8*  0.7*  1.1*   ALYM  0.9  0.7*  0.7*   --   0.6*  0.7*  0.5*  0.6*   ZINA  0.5  0.3  0.3   --   0.1  0.1  0.2  0.3   AEOS   --   0.0  0.0   --   0.0  0.0  0.0  0.0   HGB  7.3*  7.3*  7.1*  7.9*  7.2*  7.6*  8.1*  8.7*   HCT  22.6*  23.0*  22.2*   --   23.0*  23.5*  25.9*  27.2*   PLT  101*  108*  102*   --   96*  95*  111*  113*       Arterial Blood Gas Testing    Recent Labs   Lab Test  08/10/17   1515  08/02/17   1216  07/26/17   2243  07/26/17   2133   07/26/17 2017 07/25/17   1746   PH  7.33*  7.37  Incorrect specimen type  NOTTIED BY WOJCIECH DEWITT, NEW ORDER TO REPLACE.7/26/17 AT 2346 BY ZAINAB.  CORRECTED ON 07/26 AT 2350: PREVIOUSLY REPORTED AS 7.27     --    --   Canceled, Test credited   Incorrect specimen type    7.32*   PCO2  34*  31*  Incorrect specimen type  NOTTIED BY WOJCIECH DEWITT, NEW ORDER TO REPLACE.7/26/17 AT 2346 BY JS.  CORRECTED ON 07/26 AT 2350: PREVIOUSLY REPORTED AS 45     --    --   Canceled, Test credited   Incorrect specimen type    42   PO2  68*  69*  Incorrect specimen type  NOTTIED BY WOJCIECH DEWITT, NEW ORDER TO REPLACE.7/26/17 AT 2346 BY  ZAINAB.  CORRECTED ON 07/26 AT 2350: PREVIOUSLY REPORTED AS 53     --    --   Canceled, Test credited   Incorrect specimen type    81   HCO3  18*  18*  Incorrect specimen type  NOTTIED BY WOJCIECH DEWITT, NEW ORDER TO REPLACE.7/26/17 AT 2346 BY ZAINAB.  CORRECTED ON 07/26 AT 2350: PREVIOUSLY REPORTED AS 20     --    --   Canceled, Test credited   Incorrect specimen type    22   O2PER  2L  21  Incorrect specimen type  NOTTIED BY WOJCIECH DEWITT, NEW ORDER TO REPLACE.7/26/17 AT 2346 BY ZAINAB.  CORRECTED ON 07/26 AT 2350: PREVIOUSLY REPORTED AS 40    40  62   < >  100.0  100  40.0    < > = values in this interval not displayed.        Urine Studies     Recent Labs   Lab Test  08/10/17   1752  08/08/17   1600  08/05/17   0617  07/28/17   1042  07/25/17   1205   URINEPH  5.0  5.0  5.0  5.0  5.0   NITRITE  Negative  Negative  Negative  Negative  Negative   LEUKEST  Negative  Negative  Negative  Negative  Trace*   WBCU  10*  7*  5*  6*  5*       Vancomycin Levels     Recent Labs   Lab Test  07/06/17   0316  10/28/16   1405   VANCOMYCIN  22.1  14.4       Tacrolimus levels    Invalid input(s): TACROLIMUS, TAC, TACR  Transplant Immunosuppression Labs Latest Ref Rng & Units 8/25/2017 8/24/2017 8/23/2017 8/22/2017 8/21/2017   Tacro Level 5.0 - 15.0 ug/L 13.3 10.6 7.0 3.9(L) 4.4(L)   Tacro Level - Not Provided Not Provided Not Provided Not Provided Not Provided   Creat 0.66 - 1.25 mg/dL 0.91 0.90 0.92 0.98 1.00   BUN 7 - 30 mg/dL 38(H) 39(H) 45(H) 45(H) 48(H)   WBC 4.0 - 11.0 10e9/L 2.4(L) 2.0(L) 1.8(L) 1.3(L) 1.7(L)   Neutrophil % 40.0 44.6 40.9 46.1 48.3   ANEU 1.6 - 8.3 10e9/L 1.0(L) 0.9(L) 0.7(L) 0.6(L) 0.8(L)       CSF testing     Recent Labs   Lab Test  06/11/09   0225   CWBC  1   CRBC  2   CGLU  104*   CTP  54         Microbiology:  As reviewed in the HPI paragraph.     Last check of C difficile  C Diff Toxin B PCR   Date Value Ref Range Status   08/13/2017  NEG Final    Negative  Negative: Clostridium difficile target DNA sequences  NOT detected, presumed   negative for Clostridium difficile toxin B or the number of bacteria present   may be below the limit of detection for the test.   FDA approved assay performed using [employer] GeneXpert real-time PCR.   A negative result does not exclude actual disease due to Clostridium difficile   and may be due to improper collection, handling and storage of the specimen or   the number of organisms in the specimen is below the detection limit of the   assay.         Virology:  CMV viral loads    Recent Labs   Lab Test  08/24/17   0636  08/17/17   0632  08/10/17   0704  08/03/17   0533  07/27/17   1109   CSPEC  Plasma  Plasma, EDTA anticoagulant  EDTA PLASMA  CORRECTED ON 08/11 AT 0714: PREVIOUSLY REPORTED AS Whole Blood    EDTA PLASMA  EDTA PLASMA  CORRECTED ON 07/28 AT 0840: PREVIOUSLY REPORTED AS Blood     CMVLOG  Not Calculated  Not Calculated  <2.1  <2.1  2.5*       CMV viral loads    Log IU/mL of CMVQNT   Date Value Ref Range Status   08/24/2017 Not Calculated <2.1 [Log_IU]/mL Final   08/17/2017 Not Calculated <2.1 [Log_IU]/mL Final   08/10/2017 <2.1 <2.1 [Log_IU]/mL Final   08/03/2017 <2.1 <2.1 [Log_IU]/mL Final   07/27/2017 2.5 (H) <2.1 [Log_IU]/mL Final   07/20/2017 3.3 (H) <2.1 [Log_IU]/mL Final   07/18/2017 3.0 (H) <2.1 [Log_IU]/mL Final   07/15/2017 3.6 (H) <2.1 [Log_IU]/mL Final   07/10/2017 2.2 (H) <2.1 [Log_IU]/mL Final   07/03/2017 <2.1 <2.1 [Log_IU]/mL Final   06/26/2017 Not Calculated <2.1 [Log_IU]/mL Final       CMV resistance testing  Recent Labs   Lab Test  07/27/17   1109   CMVCID  Canceled, Test credited   Unsatisfactory specimen   Quantity not sufficient   Notification of test cancellation was given to  Naseem Kemp.7/28/17 at 1408.TV.     CMVFOS  Canceled, Test credited   Unsatisfactory specimen   Quantity not sufficient   Notification of test cancellation was given to  Naseem Kemp.7/28/17 at 1408.TV.     CMVGAN  Canceled, Test credited   Unsatisfactory specimen   Quantity not  sufficient   Notification of test cancellation was given to  Naseem Kemp.7/28/17 at 1408.TV.       CMV Cidofovir Resist   Date Value Ref Range Status   07/27/2017   Final    Canceled, Test credited   Unsatisfactory specimen   Quantity not sufficient   Notification of test cancellation was given to  Naseem Kemp.7/28/17 at 1408.TV.       CMV Foscarnet Resist   Date Value Ref Range Status   07/27/2017   Final    Canceled, Test credited   Unsatisfactory specimen   Quantity not sufficient   Notification of test cancellation was given to  Naseem Kemp.7/28/17 at 1408.TV.       CMV Ganciclovir Resis   Date Value Ref Range Status   07/27/2017   Final    Canceled, Test credited   Unsatisfactory specimen   Quantity not sufficient   Notification of test cancellation was given to  Naseem Kemp.7/28/17 at 1408.TV.         USCNS Invalid input(s): USCN, USCNS    USCNS No components found for: USCN, USCNS      No results found for: H6RES    EBV DNA Copies/mL   Date Value Ref Range Status   08/15/2017 753 (A) EBVNEG^EBV DNA Not Detected [Copies]/mL Final   08/01/2017 6864 (A) EBVNEG [Copies]/mL Final   07/18/2017 054452 (A) EBVNEG [Copies]/mL Final         Pathology  Colon pathology after resection 7/26/2017   FINAL DIAGNOSIS:   A. COLON, TERMINAL ILEUM, APPENDIX, RIGHT COLON, TRANSVERSE COLON, RIGHT   ART-COLECTOMY:   - Colonic wall with perforation, edema, and acute serositis   - Background colonic mucosa with no significant histologic abnormality   - CMV immunostain is positive in rare (3) cells   - Terminal ileum with no significant histologic abnormality   - Viable, unremarkable surgical margins   - One benign lymph node (0/1)   - Appendix with fibrous obliteration of the tip     B. OMENTUM, PARTIAL OMENTECTOMY:   - Fibroadipose tissue with acute and chronic inflammation, fat necrosis   Colon biopsies 7/21/2017   A: Colon biopsy, ascending   B: Colon biopsy, descending   FINAL DIAGNOSIS:   A. COLON BIOPSY, ASCENDING:    - Colonic mucosa with no significant histologic abnormality   - Negative for intraepithelial lymphocytosis or deposition of   subepithelial thick collagenous band   B. COLON BIOPSY, DESCENDING:   - Crypt architecture distortion, consistent with healed prior injury   - Negative for active inflammation or dysplasia   - Negative for intraepithelial lymphocytosis or deposition of   subepithelial thick collagenous band     Cytology of ascitic fluid 7/25/2017   PERITONEAL FLUID, ASCITES:   -Negative for malignancy   Cytology of ascitic fluid 7/14/2017.   Peritoneal fluid, ascites:   - Negative for malignancy   - Acute and chronic inflammation present   Specimen Adequacy: Satisfactory for evaluation.   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   INTERPRETATION:   Ascites fluid:        Rare to absent viable B cells     COMMENT:   This specimen was collected on 7/14/17 but was not ordered/delivered to   lab/run until 7/18/17 - results should be interpreted with caution due   to low cellularity/viability.     Due to limited cellularity we were only able to analyze the B cells.   There is no immunophenotypic evidence of B cell non-Hodgkin lymphoma.   Neoplastic cells, including large cells, may not survive specimen   processing. This sample may not be representative. Final interpretation   requires correlation with morphologic and clinical features.       Imaging:  Right finger XR 8/19/2017 reviewed by myself   IMPRESSION: No erosive changes in the right index finger to suggest  osteomyelitis.     CXR 8/25/2017.   IMPRESSION:   1. Enteric tube and PICC in stable positions.  2. Pulmonary edema.  3. Denser left retrocardiac opacities suggest the possibility of  superimposed infection    CT a/p 8/23/2017   IMPRESSION:   1. Overall increased size of intraperitoneal and retroperitoneal fluid  collections since 8/15/2017, as described above. In particular, the 2  collections which contain drainage catheters appear mildly  increased  in size since 8/15/2017. Other smaller collections are stable to  slightly decreased in size.  2. Worsening small bowel dilation, especially of the jejunum in the  left upper quadrant, favored to represent adynamic ileus although  developing small bowel obstruction cannot be excluded.  3. Moderate volume ascites and extensive mesenteric and periportal  congestion is increased since 8/15/2017.  4. Unchanged splenomegaly and portal venous collaterals throughout the  upper abdomen.  5. Slight increase in volume of small bilateral pleural effusions and  associated bibasilar atelectasis.   6. Anasarca.  CT a/p 8/15/2017  IMPRESSION:   1. Contrast opacification of the mucous fistula which is contiguous  with the transverse and descending colon. No leak identified. Distal  sigmoid colon and rectum were not well opacified. Unchanged wall  thickening involving the descending colon and rectum.  2. Interval placement of anterior abdominal drainage catheter and  stable position of right retroperitoneal drainage catheter. Multiple  intra-abdominal fluid collections containing foci of gas, the largest  of which has decreased in size secondary to catheter placement.   3. Stable mild bilateral hydronephrosis. Bladder wall thickening may  be reactive or due to a urinary tract infection.  4. Stable bibasilar atelectasis and small pleural effusions right  greater than left.  5. Splenomegaly.  CT a/p 8/14/2017  IMPRESSION:   1a. Moderate volume ascites, stable with increased foci of  intraperitoneal gas, potentially relating to catheter flushes,  redistribution of retroperitoneal gas or infection though given recent  surgical procedures on the presence of two ostomies, perforation or  dehiscence is also a consideration.  1b. Thickened peritoneum concerning for peritonitis.  2. Complex right retroperitoneal gas containing collection is stable  to slightly decreased in size compared with 8/9/2017. Pigtail catheter  is in good  position.   3. Stable loculated bibasilar pleural effusions and bibasilar  consolidative opacities which could represent infection or  atelectasis.  4. Postsurgical changes of right hemicolectomy. Several loops of  borderline dilated small bowel; however, oral contrast reaches the  ileostomy.  5. Stable left hydronephrosis.    CT c/a/p 7/25/2017 reviewed by myself.   IMPRESSION:   1. New large heterogeneous area of right-sided retroperitoneal gas  which is highly concerning for an infectious process. Given the  history of prior neutropenic colitis and biopsies, there could also be  a component of stool from a ruptured viscus, despite the lack of  surrounding bowel loops and no extraluminal oral contrast. Surgical  consultation is recommended.   2. Bibasilar atelectasis versus consolidation with small bilateral  pleural effusions.  3. Extensive mesenteric and soft tissue edema with large volume  ascites. Numerous mesenteric and retroperitoneal lymph nodes which are  presumably reactive.  4. Postsurgical changes of liver transplant.  CT c/a/p7/13/2017 Reviewed by myself.   IMPRESSION:   1. Improved moderate right-sided pleural effusion and resolution of  left-sided pleural effusion. There remain large areas of  atelectasis/consolidation in both lungs, including in the left lower  lobe despite resolution of left-sided pleural effusion. Superimposed  infectious process cannot be excluded.  2. Moderate-large amount of ascites increased from 7/8/2017, which  makes it difficult to evaluate for the presence or absence of  inflammatory change or infectious process in the abdomen or pelvis. No  intra-abdominal abscess.  3. Stable small presumed hematoma within the ventral abdominal wall  fat.  4. Splenomegaly.    MRI brain 7/20/2017.   Impression:  1. Significant image degradation due to motion artifact, with no  definite acute intracranial pathology. No abnormal contrast enhancing  intracranial lesions.  2. Mucosal  thickening in the sphenoid and maxillary sinuses and left  greater than right mastoid fluid are nonspecific in the setting of  recent intubation.    Naseem Figueroa  Pager: [phone]

## 2017-08-25 NOTE — PROVIDER NOTIFICATION
Just now verbally notified CARLOS Alas that Camacho' potassium has been 5.5 for the last two days.  Evette says that that is okay for now.

## 2017-08-25 NOTE — PROGRESS NOTES
Transplant Surgery  Inpatient Daily Progress Note  08/25/2017    Assessment & Plan: Camacho Bhagat is a 52 year old male with history of CASTAÑEDA cirrhosis s/p liver transplant on 3/4/17. Admitted 7/4/17 from [facility] with sepsis secondary to colitis, taken to OR for initial ex-lap with findings of typhlitis in the right colon, wound left open with wound vac in place for re exploration and interval closure on 7/5/17. Concern for CMV colitis with low count CMV viremia, underwent colonoscopy on 7/21/17, biopsies obtained.  On 7/25/17 patient became septic with abdominal compartment syndrome.  CT noted new large heterogeneous right sided retroperitoneal gas and air tracking along duodenum, and patient underwent an ex-lap, right angelique-colectomy, end ileostomy, mucus fistula, partial omentectomy on 7/26/17 by colorectal surgery.  Post-op course complicated by retroperitoneal abscess/peritonitis requiring percutaneous drainage.    Graft Function: Good function. LFTs acceptable (checking every M, Th).   Immunosuppression Management:   CellCept discontinued (6/27/17) due to neutropenia.   Prograf goal decreased to ~5-6 due to sepsis. Level pending.  Cardiorespiratory:   -Suspected RONNIE:  Uses O2 overnight only for brief apnea while sleeping per nursing.  -HTN: SBP 140s-160s. Amlodipine increased this week. Pt on daily Florinef daily for hyperkalemia. Continue lasix.   -R/o HCAP:  New productive cough on 8/24, no fever.  CXR: retrocardiac opacity.  Already on Augmentin.  MRSA swab negative.  Add duonebs and mycomust x2 days.  Out of bed.  Aggressive pulmonary toilet.  Hematology: Pancytopenia present on admission, persists.  Contributing factors include medications and CMV infection.  -Anemia- Hemoglobin 7.3, stable.  Last blood transfusion on 8/18.  -Leukopenia/neutropenia- WBC 2.3, ANC 1.  Received GCSF 8/20, 8/22-25.  -Thrombocytopenia-  Plt dropped with linezolid.  Now increasing.  GI:   -Neutropenic colitis on admission.  Colonoscopy 7/21. Biopsy: resolving injury secondary to possible drug induced vs infectious.   -Bowel perforation: 7/25 CT scan abd/pelvis-new large heterogeneous right sided retroperitoneal gas and air tracking along duodenum.  No contrast extravasation.  No intraperitoneal air noted. Colorectal surgery performed Ex lap, right angelique-colectomy, end ileostomy, mucus fistula, partial omentectomy on 7/26. Findings no neida perforation, phlegmon/inflammation right colon. Colon pathology suggested colon perforation, negative for malignancy; CMV stain positive.    -Retroperitoneal abscess/ peritonitis: See ID section below.  -Diet:  Resume low K diet with aspiration precautions while eating.  Nepro NJ feeds, cycled.  Magen counts.  -High output  ileostomy:  Goal ileostomy output ~ 1200 cc in 24 hrs. Stool watery today, but missed imodium doses yesterday. Continue loperamide 4mg QID, Lomotil BID, and fiber BID.   Fluid/Electrolytes/Renal:   -EJ: on admission, d/t ATN sec to sepsis. Now resolved, Cr < 1.  -Hypernatremia: resolved with free water.    -Hyperkalemia: Neph consulted earlier in hospitalization. 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  K 5.5, stable, on lasix daily bicarb, florinef, low K diet.   Endocrine: DM type 2. Endocrine consulting for glycemic management.  Hypoglycemic while NPO.  Infectious disease: Afebrile.  WBC 2.3, ID following.  -Retroperitoneal abscess/peritonitis: CT C/A/P 8/9 showed a persistent gas/fluid collection RLQ, peritonitis, some free air but no evidence of contrast extravasation.  8/9 retroperitoneal drain placed, cx + clostridium septicum.  8/11 paracentesis: WBC 5,038, drain cultures + clostridium septicum (day 6, broth only).  8/14 CT scan abd/pelvis: Complex gas-containing fluid collection in the right retroperitoneum decreased slightly in size, moderate ascites with intraperitoneal gas, peritonitis noted, no bowel leak.  8/15 peritoneal drain placement: WBC 15,680, culture negative.  8/23 CT:  Fluid  collections slightly larger.  Patient clinically worse with increased AMS.  8/24 IR replaced retroperitoneal drain, placed new 12F LLQ drain.  Patient improved clinically after drains placed.   Will send fluid culture from LLQ drain.  On zosyn (8/9-8/22), linezolid (8/9-8/15), switched to Augmentin (8/22-present).  -CMV viremia: Primary CMV infection.  (CMV R-D+) CMV colitis per pathology, peak serum 7/15 4225 IU/ml.   IV Ganciclovir (started 7/20).  CMV count fell to <137. Decreased Ganciclovir to 5mg/kg on 8/18.  Switched to valcyte 900mg daily 8/22, continue until 8/26.  Check CMV quant weekly until 9/24 and then every other week for 2 months.  -EBV viremia:  Peak serum 406,697 copies/ml on 7/18.   753 copies/ml on 8/15.  Check quant monthly, due 9/15.  -Cellulitis/dry gangrene:  Right 1st finger, on linezolid (8/19- 8/22), switched to doxycycline x7 days (8/22-8/28).  Clinically resolved.  -R/o HCAP: As above.  Infectious disease resolved issues:  -Severe sepsis: On admission, secondary to right colon phlegmon. Meropenem/daptomycin/micafungin tx x 10 days completed on 8/3. S/p right angelique-colectomy.  Path: CMV stain +, perforation of colon noted.   Neuro: Toxic metabolic encephalopathy secondary to infection, prolonged hospital stay.  Improved today.  Vascular:  Microvascular ischemic injury in digits (toes, finger) this is most likely due to injury while patient was on pressor therapy with sepsis. Erythema right 1st finger documented and marked, now resolved.   Activity: Deconditioned due to prolong hospital course. Daily PT/OT, encourage pt to be OOB to chair at least TID.   Prophylaxis: DVT-Heparin SQ  Disposition: 7A.     Medical Decision Making: Medium    PILLO/Fellow/Resident Provider: Evette Pennington NP  [id number]    Faculty: Drew Dalal MD     __________________________________________________________________  Interval History:   Overnight events:  Feeling much better today, no nausea.  Fully alert and  oriented.  Some left sided abd pain.  Feels very slightly short of breath.  Inc urine.    ROS:   A 10-point review of systems was negative except as noted above.    Meds:    menthol   Transdermal Q8H     ipratropium - albuterol 0.5 mg/2.5 mg/3 mL  3 mL Nebulization 4x Daily     acetylcysteine  2 mL Nebulization 4x Daily     diphenoxylate-atropine  5 mL Oral BID     tacrolimus  5 mg Oral BID IS     insulin aspart  1-12 Units Subcutaneous 5x Daily     furosemide  20 mg Oral Daily     pantoprazole  40 mg Oral Daily     amoxicillin-clavulanate  875 mg Oral BID     valGANciclovir  900 mg Oral Daily     doxycycline  100 mg Oral BID     filgrastim (NEUPOGEN/GRANIX) intravenous  5 mcg/kg (Dosing Weight) Intravenous Daily at 8 pm     amLODIPine  10 mg Oral Daily     protein modular  1 packet Per Feeding Tube Daily     insulin isophane human  20 Units Subcutaneous QPM     insulin glargine  20 Units Subcutaneous QAM     insulin aspart   Subcutaneous TID w/meals     artificial saliva  15 mL Swish & Spit 4x Daily     heparin  5,000 Units Subcutaneous Q8H     fludrocortisone  0.1 mg Oral Daily     sodium chloride (PF)  10 mL Irrigation Q8H     fiber modular  1 packet Per Feeding Tube BID     sodium chloride (PF)  20 mL Irrigation Q6H     saccharomyces boulardii  250 mg Oral BID     sodium bicarbonate  1,300 mg Per NG tube TID     miconazole with skin protectant   Topical BID     loperamide  4 mg Oral or Feeding Tube 4x Daily     aspirin  80 mg Oral Daily     sodium chloride (PF)  3 mL Intracatheter Q8H       Physical Exam:     Admit Weight: 94.2 kg (207 lb 11.2 oz) (SCALE 2)    Current vitals:   /84 (BP Location: Left arm)  Pulse 97  Temp 98.4  F (36.9  C) (Oral)  Resp 16  Ht 1.829 m (6')  Wt 99.9 kg (220 lb 4.8 oz)  SpO2 91%  BMI 29.88 kg/m2    Vital sign ranges:    Temp:  [97.8  F (36.6  C)-98.6  F (37  C)] 98.4  F (36.9  C)  Pulse:  [92-97] 97  Heart Rate:  [85-97] 94  Resp:  [16-20] 16  BP: (123-156)/(74-99)  156/84  SpO2:  [91 %-97 %] 91 %  Patient Vitals for the past 24 hrs:   BP Temp Temp src Pulse Heart Rate Resp SpO2   08/25/17 0817 156/84 98.4  F (36.9  C) Oral - 94 16 91 %   08/25/17 0500 151/82 97.9  F (36.6  C) Oral - 93 20 93 %   08/25/17 0000 147/79 98.3  F (36.8  C) Oral - 93 20 97 %   08/24/17 2100 143/79 - - - 90 16 93 %   08/24/17 2030 137/79 - - - 91 16 -   08/24/17 2000 143/78 - - - 89 16 -   08/24/17 1808 128/77 97.8  F (36.6  C) Oral 97 97 16 97 %   08/24/17 1730 128/74 - - - 85 16 95 %   08/24/17 1720 (!) 139/99 - - - 86 16 97 %   08/24/17 1710 136/82 - - - 86 16 96 %   08/24/17 1700 140/85 - - - 89 16 96 %   08/24/17 1650 148/83 - - - 89 16 97 %   08/24/17 1400 - - - - - - 95 %   08/24/17 1149 123/82 98.6  F (37  C) Oral 93 93 18 91 %   08/24/17 1100 - - - - - - 96 %   08/24/17 0916 149/87 98  F (36.7  C) Oral 92 92 18 97 %     General Appearance: NAD, laying in bed  Skin: normal, warm, dry  Heart: RRR  Lungs: Diminished bilateral, no cough today  Abdomen: soft, rounded, tender left side. Ileostomy with brown watery output. Mucus fistula covered. Midline incision, c/d/i.  Right abdominal Drains: both serosang.  LLQ drain: serosang with thick tan opaque material  : Incontinent at times  Extremities: No edema. R index finger with necrotic blacked finger.  Necrotic blackened areas on right 1st & 2nd toes and left 2nd toe.  Neurologic: A&O x4, speech appropriate      Data:   CMP    Recent Labs  Lab 08/25/17  0540 08/24/17  0636  08/21/17  0649    139  < > 141   POTASSIUM 5.5* 5.5*  < > 5.2   CHLORIDE 114* 110*  < > 113*   CO2 25 24  < > 23   * 135*  < > 129*   BUN 38* 39*  < > 48*   CR 0.91 0.90  < > 1.00   GFRESTIMATED 87 88  < > 78   GFRESTBLACK >90 >90  < > >90   JACOB 8.4* 8.2*  < > 8.6   MAG 1.8 1.9  < > 1.9   PHOS 4.3 4.1  < > 3.4   ALBUMIN  --  1.7*  --  1.6*   BILITOTAL  --  0.4  --  0.4   ALKPHOS  --  243*  --  245*   AST  --  34  --  39   ALT  --  21  --  24   < > = values in this  interval not displayed.  CBC    Recent Labs  Lab 08/25/17  0540 08/24/17  0636   HGB 7.3* 7.3*   WBC 2.4* 2.0*   * 108*     COAGS    Recent Labs  Lab 08/24/17  1406   INR 1.12      Urinalysis  Recent Labs   Lab Test  08/10/17   1752  08/08/17   1600   06/14/17   1508   04/11/16   1345   COLOR  Yellow  Yellow   < >   --    < >  Yellow   APPEARANCE  Slightly Cloudy  Slightly Cloudy   < >   --    < >  Clear   URINEGLC  Negative  Negative   < >   --    < >  30*   URINEBILI  Small   This is an unconfirmed screening test result. A positive result may be false.  *  Negative   < >   --    < >  Negative   URINEKETONE  Negative  Negative   < >   --    < >  Negative   SG  1.012  1.010   < >   --    < >  1.016   UBLD  Negative  Negative   < >   --    < >  Small*   URINEPH  5.0  5.0   < >   --    < >  5.0   PROTEIN  30*  30*   < >   --    < >  30*   NITRITE  Negative  Negative   < >   --    < >  Negative   LEUKEST  Negative  Negative   < >   --    < >  Negative   RBCU  0  3*   < >   --    < >  1   WBCU  10*  7*   < >   --    < >  1   UTPG   --    --    --   1.55*   --   0.41*    < > = values in this interval not displayed.

## 2017-08-25 NOTE — PROGRESS NOTES
Calorie Counts    Intake Recorded For: 8/24 Kcals: 0 Protein: 0g    # Meals Recorded: No Food Intake Recorded    # Supplements Recorded: 0

## 2017-08-26 LAB
ABO + RH BLD: NORMAL
ABO + RH BLD: NORMAL
ANION GAP SERPL CALCULATED.3IONS-SCNC: 6 MMOL/L (ref 3–14)
ANISOCYTOSIS BLD QL SMEAR: ABNORMAL
BASOPHILS # BLD AUTO: 0 10E9/L (ref 0–0.2)
BASOPHILS NFR BLD AUTO: 0 %
BLD GP AB SCN SERPL QL: NORMAL
BLD PROD TYP BPU: NORMAL
BLD UNIT ID BPU: 0
BLD UNIT ID BPU: 0
BLOOD BANK CMNT PATIENT-IMP: NORMAL
BLOOD PRODUCT CODE: NORMAL
BLOOD PRODUCT CODE: NORMAL
BPU ID: NORMAL
BPU ID: NORMAL
BUN SERPL-MCNC: 34 MG/DL (ref 7–30)
CALCIUM SERPL-MCNC: 8.1 MG/DL (ref 8.5–10.1)
CHLORIDE SERPL-SCNC: 109 MMOL/L (ref 94–109)
CO2 SERPL-SCNC: 24 MMOL/L (ref 20–32)
CREAT SERPL-MCNC: 0.92 MG/DL (ref 0.66–1.25)
DIFFERENTIAL METHOD BLD: ABNORMAL
EOSINOPHIL # BLD AUTO: 0 10E9/L (ref 0–0.7)
EOSINOPHIL NFR BLD AUTO: 0 %
ERYTHROCYTE [DISTWIDTH] IN BLOOD BY AUTOMATED COUNT: 19.8 % (ref 10–15)
ERYTHROCYTE [DISTWIDTH] IN BLOOD BY AUTOMATED COUNT: 21.3 % (ref 10–15)
GFR SERPL CREATININE-BSD FRML MDRD: 86 ML/MIN/1.7M2
GLUCOSE BLDC GLUCOMTR-MCNC: 111 MG/DL (ref 70–99)
GLUCOSE BLDC GLUCOMTR-MCNC: 112 MG/DL (ref 70–99)
GLUCOSE BLDC GLUCOMTR-MCNC: 113 MG/DL (ref 70–99)
GLUCOSE BLDC GLUCOMTR-MCNC: 128 MG/DL (ref 70–99)
GLUCOSE BLDC GLUCOMTR-MCNC: 140 MG/DL (ref 70–99)
GLUCOSE BLDC GLUCOMTR-MCNC: 163 MG/DL (ref 70–99)
GLUCOSE BLDC GLUCOMTR-MCNC: 73 MG/DL (ref 70–99)
GLUCOSE BLDC GLUCOMTR-MCNC: 78 MG/DL (ref 70–99)
GLUCOSE BLDC GLUCOMTR-MCNC: 88 MG/DL (ref 70–99)
GLUCOSE BLDC GLUCOMTR-MCNC: 92 MG/DL (ref 70–99)
GLUCOSE SERPL-MCNC: 163 MG/DL (ref 70–99)
HCT VFR BLD AUTO: 21.8 % (ref 40–53)
HCT VFR BLD AUTO: 24.9 % (ref 40–53)
HGB BLD-MCNC: 6.9 G/DL (ref 13.3–17.7)
HGB BLD-MCNC: 8.1 G/DL (ref 13.3–17.7)
LYMPHOCYTES # BLD AUTO: 0.8 10E9/L (ref 0.8–5.3)
LYMPHOCYTES NFR BLD AUTO: 25.9 %
MAGNESIUM SERPL-MCNC: 2.1 MG/DL (ref 1.6–2.3)
MCH RBC QN AUTO: 29.6 PG (ref 26.5–33)
MCH RBC QN AUTO: 30.1 PG (ref 26.5–33)
MCHC RBC AUTO-ENTMCNC: 31.7 G/DL (ref 31.5–36.5)
MCHC RBC AUTO-ENTMCNC: 32.5 G/DL (ref 31.5–36.5)
MCV RBC AUTO: 93 FL (ref 78–100)
MCV RBC AUTO: 94 FL (ref 78–100)
MONOCYTES # BLD AUTO: 0.6 10E9/L (ref 0–1.3)
MONOCYTES NFR BLD AUTO: 18.5 %
NEUTROPHILS # BLD AUTO: 1.7 10E9/L (ref 1.6–8.3)
NEUTROPHILS NFR BLD AUTO: 55.6 %
NRBC # BLD AUTO: 0 10*3/UL
NRBC BLD AUTO-RTO: 1 /100
NUM BPU REQUESTED: 2
PHOSPHATE SERPL-MCNC: 3.9 MG/DL (ref 2.5–4.5)
PLATELET # BLD AUTO: 88 10E9/L (ref 150–450)
PLATELET # BLD AUTO: 95 10E9/L (ref 150–450)
PLATELET # BLD EST: ABNORMAL 10*3/UL
POTASSIUM SERPL-SCNC: 5.7 MMOL/L (ref 3.4–5.3)
RBC # BLD AUTO: 2.33 10E12/L (ref 4.4–5.9)
RBC # BLD AUTO: 2.69 10E12/L (ref 4.4–5.9)
SODIUM SERPL-SCNC: 140 MMOL/L (ref 133–144)
SPECIMEN EXP DATE BLD: NORMAL
TACROLIMUS BLD-MCNC: 7.3 UG/L (ref 5–15)
TME LAST DOSE: NORMAL H
TRANSFUSION STATUS PATIENT QL: NORMAL
WBC # BLD AUTO: 2.9 10E9/L (ref 4–11)
WBC # BLD AUTO: 3 10E9/L (ref 4–11)

## 2017-08-26 PROCEDURE — 86900 BLOOD TYPING SEROLOGIC ABO: CPT

## 2017-08-26 PROCEDURE — 12000025 ZZH R&B TRANSPLANT INTERMEDIATE

## 2017-08-26 PROCEDURE — 25000131 ZZH RX MED GY IP 250 OP 636 PS 637: Performed by: NURSE PRACTITIONER

## 2017-08-26 PROCEDURE — 25000125 ZZHC RX 250: Performed by: TRANSPLANT SURGERY

## 2017-08-26 PROCEDURE — 36592 COLLECT BLOOD FROM PICC: CPT | Performed by: TRANSPLANT SURGERY

## 2017-08-26 PROCEDURE — 25000128 H RX IP 250 OP 636: Performed by: TRANSPLANT SURGERY

## 2017-08-26 PROCEDURE — 25000128 H RX IP 250 OP 636: Performed by: STUDENT IN AN ORGANIZED HEALTH CARE EDUCATION/TRAINING PROGRAM

## 2017-08-26 PROCEDURE — 25000125 ZZHC RX 250: Performed by: NURSE PRACTITIONER

## 2017-08-26 PROCEDURE — 25000128 H RX IP 250 OP 636: Performed by: NURSE PRACTITIONER

## 2017-08-26 PROCEDURE — 85025 COMPLETE CBC W/AUTO DIFF WBC: CPT | Performed by: STUDENT IN AN ORGANIZED HEALTH CARE EDUCATION/TRAINING PROGRAM

## 2017-08-26 PROCEDURE — 94640 AIRWAY INHALATION TREATMENT: CPT

## 2017-08-26 PROCEDURE — 25000132 ZZH RX MED GY IP 250 OP 250 PS 637: Performed by: NURSE PRACTITIONER

## 2017-08-26 PROCEDURE — 25000132 ZZH RX MED GY IP 250 OP 250 PS 637: Performed by: PHYSICIAN ASSISTANT

## 2017-08-26 PROCEDURE — 25000128 H RX IP 250 OP 636: Performed by: PHYSICIAN ASSISTANT

## 2017-08-26 PROCEDURE — 80197 ASSAY OF TACROLIMUS: CPT | Performed by: STUDENT IN AN ORGANIZED HEALTH CARE EDUCATION/TRAINING PROGRAM

## 2017-08-26 PROCEDURE — 85014 HEMATOCRIT: CPT | Performed by: TRANSPLANT SURGERY

## 2017-08-26 PROCEDURE — 86923 COMPATIBILITY TEST ELECTRIC: CPT

## 2017-08-26 PROCEDURE — 25800025 ZZH RX 258: Performed by: TRANSPLANT SURGERY

## 2017-08-26 PROCEDURE — 25000132 ZZH RX MED GY IP 250 OP 250 PS 637: Performed by: TRANSPLANT SURGERY

## 2017-08-26 PROCEDURE — 00000146 ZZHCL STATISTIC GLUCOSE BY METER IP

## 2017-08-26 PROCEDURE — 94640 AIRWAY INHALATION TREATMENT: CPT | Mod: 76

## 2017-08-26 PROCEDURE — 86901 BLOOD TYPING SEROLOGIC RH(D): CPT

## 2017-08-26 PROCEDURE — 86850 RBC ANTIBODY SCREEN: CPT

## 2017-08-26 PROCEDURE — 25000132 ZZH RX MED GY IP 250 OP 250 PS 637: Performed by: SURGERY

## 2017-08-26 PROCEDURE — 80048 BASIC METABOLIC PNL TOTAL CA: CPT | Performed by: STUDENT IN AN ORGANIZED HEALTH CARE EDUCATION/TRAINING PROGRAM

## 2017-08-26 PROCEDURE — 25000132 ZZH RX MED GY IP 250 OP 250 PS 637: Performed by: COLON & RECTAL SURGERY

## 2017-08-26 PROCEDURE — 27210913 ZZH NUTRITION PRODUCT INTERMEDIATE PACKET

## 2017-08-26 PROCEDURE — 36592 COLLECT BLOOD FROM PICC: CPT

## 2017-08-26 PROCEDURE — 36592 COLLECT BLOOD FROM PICC: CPT | Performed by: STUDENT IN AN ORGANIZED HEALTH CARE EDUCATION/TRAINING PROGRAM

## 2017-08-26 PROCEDURE — 85018 HEMOGLOBIN: CPT | Performed by: TRANSPLANT SURGERY

## 2017-08-26 PROCEDURE — 83735 ASSAY OF MAGNESIUM: CPT | Performed by: STUDENT IN AN ORGANIZED HEALTH CARE EDUCATION/TRAINING PROGRAM

## 2017-08-26 PROCEDURE — P9016 RBC LEUKOCYTES REDUCED: HCPCS

## 2017-08-26 PROCEDURE — S0164 INJECTION PANTROPRAZOLE: HCPCS | Performed by: TRANSPLANT SURGERY

## 2017-08-26 PROCEDURE — 25000132 ZZH RX MED GY IP 250 OP 250 PS 637: Performed by: STUDENT IN AN ORGANIZED HEALTH CARE EDUCATION/TRAINING PROGRAM

## 2017-08-26 PROCEDURE — 84100 ASSAY OF PHOSPHORUS: CPT | Performed by: STUDENT IN AN ORGANIZED HEALTH CARE EDUCATION/TRAINING PROGRAM

## 2017-08-26 PROCEDURE — 27210432 ZZH NUTRITION PRODUCT RENAL BASIC LITER

## 2017-08-26 PROCEDURE — 85027 COMPLETE CBC AUTOMATED: CPT

## 2017-08-26 PROCEDURE — 40000275 ZZH STATISTIC RCP TIME EA 10 MIN

## 2017-08-26 RX ORDER — FUROSEMIDE 10 MG/ML
20 INJECTION INTRAMUSCULAR; INTRAVENOUS ONCE
Status: COMPLETED | OUTPATIENT
Start: 2017-08-26 | End: 2017-08-26

## 2017-08-26 RX ORDER — FUROSEMIDE 10 MG/ML
20 INJECTION INTRAMUSCULAR; INTRAVENOUS
Status: DISCONTINUED | OUTPATIENT
Start: 2017-08-26 | End: 2017-08-29

## 2017-08-26 RX ORDER — DEXTROSE MONOHYDRATE 100 MG/ML
INJECTION, SOLUTION INTRAVENOUS CONTINUOUS
Status: DISCONTINUED | OUTPATIENT
Start: 2017-08-26 | End: 2017-09-05

## 2017-08-26 RX ADMIN — TACROLIMUS 3 MG: 1 CAPSULE ORAL at 08:54

## 2017-08-26 RX ADMIN — DEXTROSE MONOHYDRATE: 100 INJECTION, SOLUTION INTRAVENOUS at 12:05

## 2017-08-26 RX ADMIN — ACETYLCYSTEINE 2 ML: 200 SOLUTION ORAL; RESPIRATORY (INHALATION) at 21:11

## 2017-08-26 RX ADMIN — LOPERAMIDE HYDROCHLORIDE 4 MG: 2 SOLUTION ORAL at 14:06

## 2017-08-26 RX ADMIN — HEPARIN SODIUM 5000 UNITS: 5000 INJECTION, SOLUTION INTRAVENOUS; SUBCUTANEOUS at 21:36

## 2017-08-26 RX ADMIN — PROCHLORPERAZINE EDISYLATE 10 MG: 5 INJECTION INTRAMUSCULAR; INTRAVENOUS at 23:22

## 2017-08-26 RX ADMIN — IPRATROPIUM BROMIDE AND ALBUTEROL SULFATE 3 ML: .5; 3 SOLUTION RESPIRATORY (INHALATION) at 12:45

## 2017-08-26 RX ADMIN — VALGANCICLOVIR HYDROCHLORIDE 900 MG: 50 POWDER, FOR SOLUTION ORAL at 08:58

## 2017-08-26 RX ADMIN — Medication 1 PACKET: at 08:53

## 2017-08-26 RX ADMIN — Medication 5 ML: at 20:34

## 2017-08-26 RX ADMIN — PANTOPRAZOLE SODIUM 8 MG/HR: 40 INJECTION, POWDER, FOR SOLUTION INTRAVENOUS at 12:50

## 2017-08-26 RX ADMIN — Medication 1 PACKET: at 08:59

## 2017-08-26 RX ADMIN — Medication 15 ML: at 20:35

## 2017-08-26 RX ADMIN — Medication 15 ML: at 12:59

## 2017-08-26 RX ADMIN — HEPARIN SODIUM 5000 UNITS: 5000 INJECTION, SOLUTION INTRAVENOUS; SUBCUTANEOUS at 06:38

## 2017-08-26 RX ADMIN — ONDANSETRON 4 MG: 2 INJECTION INTRAMUSCULAR; INTRAVENOUS at 20:29

## 2017-08-26 RX ADMIN — LOPERAMIDE HYDROCHLORIDE 4 MG: 2 SOLUTION ORAL at 08:54

## 2017-08-26 RX ADMIN — ACETYLCYSTEINE 2 ML: 200 SOLUTION ORAL; RESPIRATORY (INHALATION) at 16:25

## 2017-08-26 RX ADMIN — Medication 5 ML: at 09:40

## 2017-08-26 RX ADMIN — TACROLIMUS 3 MG: 1 CAPSULE ORAL at 18:13

## 2017-08-26 RX ADMIN — ACETYLCYSTEINE 2 ML: 200 SOLUTION ORAL; RESPIRATORY (INHALATION) at 12:45

## 2017-08-26 RX ADMIN — HEPARIN SODIUM 5000 UNITS: 5000 INJECTION, SOLUTION INTRAVENOUS; SUBCUTANEOUS at 14:06

## 2017-08-26 RX ADMIN — ACETYLCYSTEINE 2 ML: 200 SOLUTION ORAL; RESPIRATORY (INHALATION) at 08:41

## 2017-08-26 RX ADMIN — LOPERAMIDE HYDROCHLORIDE 4 MG: 2 SOLUTION ORAL at 20:34

## 2017-08-26 RX ADMIN — SODIUM BICARBONATE 650 MG TABLET 1300 MG: at 14:06

## 2017-08-26 RX ADMIN — Medication 1 PACKET: at 20:35

## 2017-08-26 RX ADMIN — DOXYCYCLINE CALCIUM 100 MG: 50 SYRUP ORAL at 08:58

## 2017-08-26 RX ADMIN — Medication 250 MG: at 20:34

## 2017-08-26 RX ADMIN — IPRATROPIUM BROMIDE AND ALBUTEROL SULFATE 3 ML: .5; 3 SOLUTION RESPIRATORY (INHALATION) at 21:10

## 2017-08-26 RX ADMIN — ACETAMINOPHEN 650 MG: 325 SOLUTION ORAL at 00:46

## 2017-08-26 RX ADMIN — SODIUM BICARBONATE 650 MG TABLET 1300 MG: at 08:53

## 2017-08-26 RX ADMIN — FLUDROCORTISONE ACETATE 0.1 MG: 0.1 TABLET ORAL at 08:54

## 2017-08-26 RX ADMIN — AMOXICILLIN AND CLAVULANATE POTASSIUM 875 MG: 400; 57 POWDER, FOR SUSPENSION ORAL at 08:59

## 2017-08-26 RX ADMIN — IPRATROPIUM BROMIDE AND ALBUTEROL SULFATE 3 ML: .5; 3 SOLUTION RESPIRATORY (INHALATION) at 16:25

## 2017-08-26 RX ADMIN — Medication 15 ML: at 16:12

## 2017-08-26 RX ADMIN — Medication 250 MG: at 08:54

## 2017-08-26 RX ADMIN — ONDANSETRON 4 MG: 2 INJECTION INTRAMUSCULAR; INTRAVENOUS at 10:05

## 2017-08-26 RX ADMIN — SODIUM BICARBONATE 650 MG TABLET 1300 MG: at 20:34

## 2017-08-26 RX ADMIN — AMLODIPINE BESYLATE 10 MG: 10 TABLET ORAL at 08:57

## 2017-08-26 RX ADMIN — PANTOPRAZOLE SODIUM 40 MG: 40 TABLET, DELAYED RELEASE ORAL at 08:55

## 2017-08-26 RX ADMIN — IPRATROPIUM BROMIDE AND ALBUTEROL SULFATE 3 ML: .5; 3 SOLUTION RESPIRATORY (INHALATION) at 08:41

## 2017-08-26 RX ADMIN — OXYCODONE HYDROCHLORIDE 5 MG: 5 SOLUTION ORAL at 21:55

## 2017-08-26 RX ADMIN — DOXYCYCLINE CALCIUM 100 MG: 50 SYRUP ORAL at 20:34

## 2017-08-26 RX ADMIN — PANTOPRAZOLE SODIUM 8 MG/HR: 40 INJECTION, POWDER, FOR SOLUTION INTRAVENOUS at 21:36

## 2017-08-26 RX ADMIN — FUROSEMIDE 20 MG: 10 INJECTION, SOLUTION INTRAVENOUS at 09:40

## 2017-08-26 RX ADMIN — AMOXICILLIN AND CLAVULANATE POTASSIUM 875 MG: 400; 57 POWDER, FOR SUSPENSION ORAL at 20:34

## 2017-08-26 NOTE — CONSULTS
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  Hemodynamically unchanged with hgb only slightly lower than previous.  Also note blood in abdominal drain.  Will evaluate concern for GI bleeding with ileoscopy and EGD tomorrow.      Recommendations    - Monitor ostomy output closely and document  - Trend hgb and transfuse < 7  - Resuscitate as needed based to minimize tachycardia and hypotension  - Continue IV PPI  - NPO at midnight for planned procedures    Gastroenterology follow up recommendations: Ileoscopy and EGD tomorrow    Thank you for involving us in this patient's care. Please do not hesitate to contact the GI service with any questions or concerns.     Pt care plan discussed with Dr. Gonsalez, GI staff physician.    Caridad Latham MD  GI Fellow  [phone]  -------------------------------------------------------------------------------------------------------------------          Reason for Consult:   We were asked by Dr. Dacosta to evaluate this patient for source of GI bleeding    History is obtained from the patient and the medical record.          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  Patient says that he  hasn't really been paying attention to ileostomy output and nursing staff empties the bag 2-3 times/day, which hasn't changed recently.  Per nursing, he had been having liquid, brown stool that ws draining through a vazquez catheter.  Had increased form to stool, and was switched to ostomy bag.  Then over the last day developed liquid, maroon tinged stool, prompting concern for GI bleeding.  Pt reports minimal abdominal pain.  No nausea or vomiting.  No hematemesis.  He has been wanting to eat, and is mostly thirsty.  Denies fever, SOB, CP, and dizziness.           Review of Systems:   A 10 point review of systems was performed and is negative except as noted in the HPI           Past Medical History:   Reviewed and edited as appropriate  Past Medical History:   Diagnosis Date     Cancer (H)      Depressive disorder, not elsewhere classified      Esophageal reflux      Fibromyalgia 1/2009    dx with Dr Benitez( Rheum)     History of thrombophlebitis      Osteoarthritis      Other acute embolism veins 11/01    Deep vein thrombophlebitis, filter placed     Other and unspecified hyperlipidemia      Other chronic nonalcoholic liver disease     Fatty liver      Other testicular hypofunction      Pneumonia, organism 10-01    Included ARDS, sepsis, and  acute renal failure; hospitalized     Rheumatoid arthritis(714.0)      Type II or unspecified type diabetes mellitus without mention of complication, not stated as uncontrolled 11/01    Managed by endocrinology     Unspecified essential hypertension 11/01    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     Unspecified sleep apnea     Uses BiPAP            Past Surgical History:   Reviewed and edited as appropriate   Past Surgical History:   Procedure Laterality Date     BENCH LIVER N/A 3/4/2017    Procedure: BENCH LIVER;  Surgeon: Jovan Tran MD;  Location: [facility] OR     C NONSPECIFIC PROCEDURE      tracheostomy     C NONSPECIFIC PROCEDURE      repair of deviated septum      C NONSPECIFIC PROCEDURE      Rt knee arthroscopy     C TOTAL KNEE ARTHROPLASTY  2008    Right knee arthroscopy     CHOLECYSTECTOMY       COLONOSCOPY N/A 2017    Procedure: COMBINED COLONOSCOPY, SINGLE OR MULTIPLE BIOPSY/POLYPECTOMY BY BIOPSY;  Colonoscopy;  Surgeon: Izaiah Montes MD;  Location: [facility] GI     ESOPHAGOSCOPY, GASTROSCOPY, DUODENOSCOPY (EGD), COMBINED N/A 2016    Procedure: COMBINED ESOPHAGOSCOPY, GASTROSCOPY, DUODENOSCOPY (EGD), BIOPSY SINGLE OR MULTIPLE;  Surgeon: Trent Pederson MD;  Location:  GI     LAPAROTOMY EXPLORATORY N/A 2017    Procedure: LAPAROTOMY EXPLORATORY;  Exploratory Laparotomy, washout;  Surgeon: Tip Zhang MD;  Location: [facility] OR     LAPAROTOMY EXPLORATORY N/A 2017    Procedure: LAPAROTOMY EXPLORATORY;  Exploratory Laparotomy, Washout with closure.;  Surgeon: Tip Zhang MD;  Location: [facility] OR     LAPAROTOMY EXPLORATORY N/A 2017    Procedure: LAPAROTOMY EXPLORATORY;  Exploratory Laparotomy, Right angelique-colectomy, end ileostomy, mucosal fistula, partial omentectomy;  Surgeon: Sara Dinh MD;  Location: [facility] OR     TRANSPLANT LIVER RECIPIENT  DONOR N/A 3/4/2017    Procedure: TRANSPLANT LIVER RECIPIENT  DONOR;  Surgeon: Jovan Tran MD;  Location: [facility] OR            Previous Endoscopy:     Results for orders placed or performed in visit on 17   COLONOSCOPY   Result Value Ref Range    COLONOSCOPY       [address] [address] [phone]     Endoscopy Department  _______________________________________________________________________________  Patient Name: Camacho Bhagat             Procedure Date: 2017 10:44 AM  MRN: [id number]                       Account Number: [id number]  YOB: 1965              Admit Type: Inpatient  Age: 51                                Gender: Male  Note Status: Finalized                Attending MD: Izaiah  Elias Montes ,   Pause for the Cause: pause for cause completed Total Sedation Time:   _______________________________________________________________________________     Procedure:           Colonoscopy  Indications:         Abnormal CT of the GI tract  Providers:           Izaiah Montes, Angelica Loo, RN, Lu Khalil, RN, Mick Queen MD, MD  Patient Profile:     50 yo male s/p OLT 2/2 CASTAÑEDA admitted with abdominal                        pain, loose s tool, and CT findings concerning for                        colitis. Colonoscopy today for further evaluation  Referring MD:          Medicines:           Midazolam 5 mg IV, Fentanyl 200 micrograms IV  Complications:       No immediate complications.  _______________________________________________________________________________  Procedure:           Pre-Anesthesia Assessment:                       - Prior to the procedure, a History and Physical was                        performed, and patient medications and allergies were                        reviewed. The patient is unable to give consent                        secondary to the patient's altered mental status. The                        risks and benefits of the procedure and the sedation                        options and risks were discussed with the patient's                        daughter. All questions were answered and informed                        consent was obtained. Patient identification and                         proposed procedure were verified by the physician and                        the nurse in the pre-procedure area in the endoscopy                        suite. Mental Status Examination: alert but confused.                        Airway Examination: normal oropharyngeal airway and neck                        mobility. Respiratory Examination: clear to                        auscultation. CV Examination: normal. Prophylactic                         Antibiotics: The patient does not require prophylactic                        antibiotics. Prior Anticoagulants: The patient has taken                        no previous anticoagulant or antiplatelet agents. ASA                        Grade Assessment: II - A patient with mild systemic                        disease. After reviewing the risks and benefits, the                        patient was deemed in satisfactory condition to undergo                        the procedure. The anesthesia plan was to use moderate                        sedat ion / analgesia (conscious sedation). Immediately                        prior to administration of medications, the patient was                        re-assessed for adequacy to receive sedatives. The heart                        rate, respiratory rate, oxygen saturations, blood                        pressure, adequacy of pulmonary ventilation, and                        response to care were monitored throughout the                        procedure. The physical status of the patient was                        re-assessed after the procedure.                       After obtaining informed consent, the colonoscope was                        passed under direct vision. Throughout the procedure,                        the patient's blood pressure, pulse, and oxygen                        saturations were monitored continuously. The Colonoscope                        was introduced through the anus and advanced to the                        cecum, identified by the ileocecal valve. The                         colonoscopy was performed with difficulty due to poor                        bowel prep with stool present.                                                                                   Findings:       The perianal and digital rectal examinations were normal.       A large amount of semi-solid solid stool was found in the entire colon,        precluding  visualization.       Normal mucosa was found in the entire colon. Biopsies were taken with a        cold forceps for histology.                                                                                   Impression:          - Entire stool filled with colon. > 50% of mucosal not                        able to be visualized. While the visualized mucosa                        appeared normal, it is certainly possible that large                        lesions including ulcers or polyps were missed. Random                        biopsies of the left and right colon were obtained.                       - Sto ol in the entire examined colon.  Recommendation:      - Return patient to [facility] for ongoing care.                       - This colonoscopy was not adequate for screening and                        should not be substituted for age appropriate cancer                        surveillance.                       - If concern for CMV colitis remains high on the                        differential then please discuss with GI team                        appropriate options for prepping this patient for                        adequate mucosal inspection.                       - Await pathology results, however no colitis was seen.                                                                                     Electronically signed by: Izaiah Montes MD  _____________________  Izaiah Montes,   7/21/2017 9:55:25 PM  I was physically present for the entire viewing portion of the exam.  __________________________  Signature of teaching physician  [id number] Montes    ___________ _______________  Mick Queen MD, MD  Number of Addenda: 0    Note Initiated On: 7/21/2017 10:44 AM  Scope In:  Scope Out:              Social History:   Reviewed and edited as appropriate  Social History     Social History     Marital status:      Spouse name: N/A     Number of children: 1     Years of education:  N/A     Occupational History            Social History Main Topics     Smoking status: Former Smoker     Smokeless tobacco: Former User     Types: Chew     Quit date: 10/8/2015      Comment: Has used chewing tobacco since age 15 , chewed for 20yrs      Alcohol use No      Comment: last drink about a year ago (quit 2001)     Drug use: No     Sexual activity: Yes     Partners: Female     Other Topics Concern     Not on file     Social History Narrative    uSED TO BE      Back in school now                Family History:   Reviewed and edited as appropriate  Family History   Problem Relation Age of Onset     DIABETES Father      Hypertension Father      Substance Abuse Father      Arthritis Mother      CANCER Mother      Thyroid     Thyroid Disease Mother      Other Cancer Mother      Colon Cancer No family hx of      Hyperlipidemia No family hx of      Coronary Artery Disease No family hx of      CEREBROVASCULAR DISEASE No family hx of      Breast Cancer No family hx of      Prostate Cancer No family hx of            Allergies:   Reviewed and edited as appropriate     Allergies   Allergen Reactions     Erythromycin GI Disturbance     Vioxx      Nausea, vomiting            Medications:     Prescriptions Prior to Admission   Medication Sig Dispense Refill Last Dose     clotrimazole (MYCELEX) 10 MG LOZG lozenge Place 1 Beatrice inside cheek 3 times daily        senna-docusate (SENOKOT-S;PERICOLACE) 8.6-50 MG per tablet Take 1-2 tablets by mouth 2 times daily        insulin aspart (NOVOLOG FLEXPEN) 100 UNIT/ML injection Inject 1-7 Units Subcutaneous 3 times daily (with meals) Do not give if BG<140        insulin aspart (NOVOLOG FLEXPEN) 100 UNIT/ML injection Inject 1-5 Units Subcutaneous At Bedtime        gabapentin (NEURONTIN) 300 MG capsule Take 1 capsule (300 mg) by mouth 3 times daily 270 capsule 3      amLODIPine (NORVASC) 5 MG tablet Take 1 tablet (5 mg) by mouth daily 90  tablet 3      tacrolimus (PROGRAF - GENERIC EQUIVALENT) 1 MG capsule Take 4capsules (4mg) in the morning and 3 capsules (3 mg) in the evening. Take every 12 hours. (Patient taking differently: 5 mg 2 times daily Take 4capsules (4mg) in the morning and 3 capsules (3 mg) in the evening. Take every 12 hours.) 210 capsule 11      mycophenolate (CELLCEPT - GENERIC EQUIVALENT) 250 MG capsule Take 1 capsule (250 mg) by mouth every 12 hours (Patient taking differently: Take 500 mg by mouth every 12 hours ) 180 capsule 3      sulfamethoxazole-trimethoprim (BACTRIM/SEPTRA) 400-80 MG per tablet Take 1 tablet on Monday and take 1 tablet on Thursday 8 tablet 3      fludrocortisone (FLORINEF) 0.1 MG tablet Take 1 tablet (0.1 mg) by mouth daily For elevated potassium 30 tablet 3      Linagliptin (TRADJENTA PO) Take 5 mg by mouth daily    Not Taking     tadalafil (CIALIS) 5 MG tablet Take 1 tablet (5 mg) by mouth daily Never use with nitroglycerin, terazosin or doxazosin. 30 tablet 11 Taking     cyclobenzaprine (FLEXERIL) 10 MG tablet Take 1 tablet (10 mg) by mouth 3 times daily as needed for muscle spasms 90 tablet 0 Taking     omeprazole (PRILOSEC) 20 MG CR capsule Take 1 capsule (20 mg) by mouth 2 times daily (before meals) 60 capsule 11 Taking     magnesium oxide (MAG-OX) 400 MG tablet Take 1 tablet (400 mg) by mouth 2 times daily 7 tablet 1 Taking     lidocaine (LIDODERM) 5 % Patch Place 1 patch onto the skin every 24 hours 30 patch 1 Past Month at Unknown time     LACTOBACILLUS EXTRA STRENGTH CAPS Take 1 capsule by mouth 2 times daily 30 capsule 1 Taking     prochlorperazine (COMPAZINE) 5 MG tablet Take 1-2 tablets (5-10 mg) by mouth every 6 hours as needed for nausea or vomiting 90 tablet 0 Past Month at Unknown time     oxyCODONE (ROXICODONE) 5 MG IR tablet Take 1-2 tablets (5-10 mg) by mouth every 4 hours as needed for moderate to severe pain 40 tablet 0 Taking     aspirin 325 MG tablet 1 tablet (325 mg) by Oral or Feeding  Tube route daily 180 tablet 4 Taking     insulin aspart (NOVOLOG PEN) 100 UNIT/ML injection DOSE:  1 units per 8 grams of carbohydrate. With meals and snacks. Only chart total amount of units given.  Do not give if pre-prandial glucose is less than 60 mg/dL. 3 mL 3 Taking     insulin glargine (LANTUS) 100 UNIT/ML injection Inject 45 Units Subcutaneous every 24 hours 3 mL 3 Taking     valGANciclovir (VALCYTE) 450 MG tablet Take 1 tablet (450 mg) by mouth daily 60 tablet 5 Taking     multivitamin, therapeutic with minerals (THERA-VIT-M) TABS tablet Take 1 tablet by mouth daily 30 each 11 Taking     ondansetron (ZOFRAN-ODT) 4 MG ODT tab Take 1 tablet (4 mg) by mouth every 8 hours as needed for nausea 30 tablet 0 Taking     glucagon 1 MG SOLR injection Inject 1 mg Subcutaneous every 15 minutes as needed for low blood sugar (May repeat x 1 only) 1 each 0 Taking             Physical Exam:   /88 (BP Location: Left arm)  Pulse 87  Temp 97.9  F (36.6  C) (Oral)  Resp 16  Ht 1.829 m (6')  Wt 98.9 kg (218 lb)  SpO2 98%  BMI 29.57 kg/m2  Wt:   Wt Readings from Last 2 Encounters:   08/25/17 98.9 kg (218 lb)   06/07/17 98 kg (216 lb)        Constitutional: Cooperative, pleasant, not dyspneic/diaphoretic, no acute distress  Eyes: Sclera anicteric/injected  Ears/nose/mouth/throat: Mucus membranes moist, hearing intact  CV: No edema, RRR  Respiratory: Unlabored breathing, CTAB  Abd: Soft, tender to palpation of RUQ, midline incision with staples in place, no associated edema, erythema or drainage, ileostomy on right side with healthy appearing mucosa and no erythema or breakdown of surrounding skin, brown stool in ostomy bag with red-orange tinged fluid, left sided abdominal drain with red liquid in tubing  Skin: Warm, perfused, no jaundice  Neuro: AAO  Psych: Normal affect           Data:   All available labs, imaging, and procedure notes were independently reviewed and interpreted, pertinent values are as  follow:    BMP  Recent Labs  Lab 08/26/17  0624 08/25/17  0540 08/24/17  0636 08/23/17  0640    143 139 139   POTASSIUM 5.7* 5.5* 5.5* 5.2   CHLORIDE 109 114* 110* 110*   JACOB 8.1* 8.4* 8.2* 8.1*   CO2 24 25 24 25   BUN 34* 38* 39* 45*   CR 0.92 0.91 0.90 0.92   * 111* 135* 139*     CBC  Recent Labs  Lab 08/26/17  0624 08/25/17  0540 08/24/17  0636 08/23/17  0640   WBC 3.0* 2.4* 2.0* 1.8*   RBC 2.33* 2.41* 2.49* 2.41*   HGB 6.9* 7.3* 7.3* 7.1*   HCT 21.8* 22.6* 23.0* 22.2*   MCV 94 94 92 92   MCH 29.6 30.3 29.3 29.5   MCHC 31.7 32.3 31.7 32.0   RDW 21.3* 20.4* 19.8* 18.7*   PLT 95* 101* 108* 102*     INR  Recent Labs  Lab 08/24/17  1406   INR 1.12     LFTs  Recent Labs  Lab 08/24/17  0636 08/21/17  0649 08/20/17  1640   ALKPHOS 243* 245*  --    AST 34 39  --    ALT 21 24  --    BILITOTAL 0.4 0.4  --    PROTTOTAL 6.3* 6.0*  --    ALBUMIN 1.7* 1.6* 1.6*

## 2017-08-26 NOTE — PLAN OF CARE
"Problem: Goal Outcome Summary  Goal: Goal Outcome Summary  Outcome: No Change  D: Camacho was very active today:  I:   he was assisted buy both floor staff and therapy staff to get OOB several times throughout the day, walking at least once for a fair distance in the hallway.  A:  He said that he felt good about all the activity, even though he said it left him feeling very \"tired.\"  P:  More activity again tomorrow: as much as he is abler to tolerate...      "

## 2017-08-26 NOTE — PROGRESS NOTES
Calorie Counts     Intake Recorded For: 8/25                Kcals: 801                     Protein: 20g     # Meals Recorded: 200% 8oz orange juice, 175% 8oz 1% milk, 100% rice krispie bar, banana-orange smoothie, 75% bagel with butter, 50% orange     # Supplements Recorded: 0

## 2017-08-26 NOTE — PROVIDER NOTIFICATION
Just now notified an on-call MD that Camacho' hemoglobin=6.9 (called up critical from the lab); they notified there team, who now is ordering a unit of blood...

## 2017-08-26 NOTE — PLAN OF CARE
Problem: Goal Outcome Summary  Goal: Goal Outcome Summary  PT: patient declined therapy this afternoon. He stated therapy doesn't give him any breaks. Encouraged patient to participate to progress endurance during hospital stay. Advised patient of benefit. He states he is independent with his ADLs and only needs physical therapy. Advised patient again that provider was from physical therapy but he continued to decline and states he wants to think about things. Declined any mobility at this time and family unable to convince. He did state would like PT to return Monday to reassess frequency to every other day. Will reschedule for Monday.

## 2017-08-26 NOTE — PROGRESS NOTES
Transplant Surgery  Inpatient Daily Progress Note  08/26/2017    Assessment & Plan: Camacho Bhagat is a 52 year old male with history of CATSAÑEDA cirrhosis s/p liver transplant on 3/4/17. Admitted 7/4/17 from [facility] with sepsis secondary to colitis, taken to OR for initial ex-lap with findings of typhlitis in the right colon, wound left open with wound vac in place for re exploration and interval closure on 7/5/17. Concern for CMV colitis with low count CMV viremia, underwent colonoscopy on 7/21/17, biopsies obtained.  On 7/25/17 patient became septic with abdominal compartment syndrome.  CT noted new large heterogeneous right sided retroperitoneal gas and air tracking along duodenum, and patient underwent an ex-lap, right angelique-colectomy, end ileostomy, mucus fistula, partial omentectomy on 7/26/17 by colorectal surgery.  Post-op course complicated by retroperitoneal abscess/peritonitis requiring percutaneous drainage.    Graft Function: Good function. LFTs acceptable (checking every M, Th).   Immunosuppression Management:   CellCept discontinued (6/27/17) due to neutropenia.   Prograf 3 BID goal ~5-6 due to sepsis. Level 8/26 7.3; will continue same dose  Cardiorespiratory:   -Suspected RONNIE:  Uses O2 overnight only for brief apnea while sleeping per nursing.  -HTN: SBP 140s-160s. Amlodipine increased this week. Pt on daily Florinef daily for hyperkalemia. Continue lasix.   -R/o HCAP:  New productive cough on 8/24, no fever.  CXR: retrocardiac opacity.  Already on Augmentin.  MRSA swab negative.  Add duonebs and mycomust x2 days.  Out of bed.  Aggressive pulmonary toilet.  Hematology: Pancytopenia present on admission, persists.  Contributing factors include medications and CMV infection.  -Anemia- Hemoglobin  6.9 from 7.3, 2 units ordered. Bloody ileostomy output. Will track hgb Q6 today and GI consult. Hold ASA. Last blood transfusion on 8/26 (2 units).   -Leukopenia/neutropenia- WBC 3.0, ANC 1.7.  Received GCSF  8/20, 8/22-25.  -Thrombocytopenia-  Plt 95 today, previously dropped with linezolid.  Now increasing.  GI:   -Neutropenic colitis on admission. Colonoscopy 7/21. Biopsy: resolving injury secondary to possible drug induced vs infectious.   -Bowel perforation: 7/25 CT scan abd/pelvis-new large heterogeneous right sided retroperitoneal gas and air tracking along duodenum.  No contrast extravasation.  No intraperitoneal air noted. Colorectal surgery performed Ex lap, right angelique-colectomy, end ileostomy, mucus fistula, partial omentectomy on 7/26. Findings no neida perforation, phlegmon/inflammation right colon. Colon pathology suggested colon perforation, negative for malignancy; CMV stain positive.    -Retroperitoneal abscess/ peritonitis: See ID section below.  -Diet:  Resume low K diet with aspiration precautions while eating.  Nepro NJ feeds, cycled.  Magen counts.  -High output  ileostomy:  Goal ileostomy output ~ 1200 cc in 24 hrs. Ileostomy output sanguinous liquid with clots today. Appreciate GI recs.  Planned for ileoscopy and EGD tomorrow. Continue loperamide 4mg QID, Lomotil BID, and fiber BID.   Fluid/Electrolytes/Renal:   -JE: on admission, d/t ATN sec to sepsis. Now resolved, Cr < 1.  -Hypernatremia: resolved with free water.    -Hyperkalemia: Neph consulted earlier in hospitalization. 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 on lasix daily bicarb, florinef, low K diet. K 5.7 today treated with 20 lasix IV.    Endocrine: DM type 2. Endocrine consulting for glycemic management.  Hypoglycemic while NPO.  Infectious disease: Afebrile.  WBC 3.0, ID following.  -Retroperitoneal abscess/peritonitis: CT C/A/P 8/9 showed a persistent gas/fluid collection RLQ, peritonitis, some free air but no evidence of contrast extravasation.  8/9 retroperitoneal drain placed, cx + clostridium septicum.  8/11 paracentesis: WBC 5,038, drain cultures + clostridium septicum (day 6, broth only).  8/14 CT scan abd/pelvis: Complex gas-containing fluid  collection in the right retroperitoneum decreased slightly in size, moderate ascites with intraperitoneal gas, peritonitis noted, no bowel leak.  8/15 peritoneal drain placement: WBC 15,680, culture negative.  8/23 CT:  Fluid collections slightly larger.  Patient clinically worse with increased AMS.  8/24 IR replaced retroperitoneal drain, placed new 12F LLQ drain.  Patient improved clinically after drains placed.   Will send fluid culture from LLQ drain.  On zosyn (8/9-8/22), linezolid (8/9-8/15), switched to Augmentin (8/22-present).  -CMV viremia: Primary CMV infection.  (CMV R-D+) CMV colitis per pathology, peak serum 7/15 4225 IU/ml.   IV Ganciclovir (started 7/20).  CMV count fell to <137. Decreased Ganciclovir to 5mg/kg on 8/18.  Switched to valcyte 900mg daily 8/22, continue until 8/26.  Check CMV quant weekly until 9/24 and then every other week for 2 months.  -EBV viremia:  Peak serum 406,697 copies/ml on 7/18.   753 copies/ml on 8/15.  Check quant monthly, due 9/15.  -Cellulitis/dry gangrene:  Right 1st finger, on linezolid (8/19- 8/22), switched to doxycycline x7 days (8/22-8/28).  Clinically resolved.  -R/o HCAP: As above.  Infectious disease resolved issues:  -Severe sepsis: On admission, secondary to right colon phlegmon. Meropenem/daptomycin/micafungin tx x 10 days completed on 8/3. S/p right angelique-colectomy.  Path: CMV stain +, perforation of colon noted.   Neuro: Toxic metabolic encephalopathy secondary to infection, prolonged hospital stay.  Improved today.  Vascular:  Microvascular ischemic injury in digits (toes, finger) this is most likely due to injury while patient was on pressor therapy with sepsis. Erythema right 1st finger documented and marked, now resolved.   Activity: Deconditioned due to prolong hospital course. Daily PT/OT, encourage pt to be OOB to chair at least TID.   Prophylaxis: DVT-Heparin SQ  Disposition: 7A.     Medical Decision Making: Medium    PILLO/Fellow/Resident Provider:  Eric Dacosta  Fellow, Transplant surgery  Pager: [phone]    Faculty: Drew Dalal MD     __________________________________________________________________  Interval History:   Overnight events:   Hgb drop to 6.9 from 7.3. Ostomy output sanguinous liquid with some clot. 2 units ordered, tracking hgb and will talk to GI.  Switching PO pantoprazole to pantoprazole drip. Hold ASA. NPO    Subjectively:   Feeling alright today. No new complaints. Walked better yesterday and describes nebs as being very helpful. No SOB/CP or increased abdominal pain. Eating a little better. Recently replaced R rectoperineals drain with good output. Good UOP      ROS:   A 10-point review of systems was negative except as noted above.    Meds:    insulin isophane human  14 Units Subcutaneous QPM     menthol   Transdermal Q8H     ipratropium - albuterol 0.5 mg/2.5 mg/3 mL  3 mL Nebulization 4x Daily     acetylcysteine  2 mL Nebulization 4x Daily     tacrolimus  3 mg Oral BID IS     diphenoxylate-atropine  5 mL Oral BID     insulin aspart  1-12 Units Subcutaneous 5x Daily     pantoprazole  40 mg Oral Daily     amoxicillin-clavulanate  875 mg Oral BID     doxycycline  100 mg Oral BID     amLODIPine  10 mg Oral Daily     protein modular  1 packet Per Feeding Tube Daily     insulin glargine  20 Units Subcutaneous QAM     insulin aspart   Subcutaneous TID w/meals     artificial saliva  15 mL Swish & Spit 4x Daily     heparin  5,000 Units Subcutaneous Q8H     fludrocortisone  0.1 mg Oral Daily     sodium chloride (PF)  10 mL Irrigation Q8H     fiber modular  1 packet Per Feeding Tube BID     sodium chloride (PF)  20 mL Irrigation Q6H     saccharomyces boulardii  250 mg Oral BID     sodium bicarbonate  1,300 mg Per NG tube TID     miconazole with skin protectant   Topical BID     loperamide  4 mg Oral or Feeding Tube 4x Daily     aspirin  80 mg Oral Daily     sodium chloride (PF)  3 mL Intracatheter Q8H       Physical Exam:     Admit Weight:  94.2 kg (207 lb 11.2 oz) (SCALE 2)    Current vitals:   /84 (BP Location: Left arm)  Pulse 91  Temp 98.5  F (36.9  C) (Oral)  Resp 20  Ht 1.829 m (6')  Wt 98.9 kg (218 lb)  SpO2 92%  BMI 29.57 kg/m2    Vital sign ranges:    Temp:  [98.1  F (36.7  C)-99.1  F (37.3  C)] 98.5  F (36.9  C)  Pulse:  [91-98] 91  Heart Rate:  [91-99] 91  Resp:  [16-24] 20  BP: (138-161)/(71-86) 153/84  SpO2:  [90 %-94 %] 92 %  Patient Vitals for the past 24 hrs:   BP Temp Temp src Pulse Heart Rate Resp SpO2   08/26/17 0845 153/84 98.5  F (36.9  C) Oral 91 91 20 92 %   08/26/17 0323 141/80 98.5  F (36.9  C) Oral - 95 18 94 %   08/25/17 2331 - - - - - - 94 %   08/25/17 2330 144/78 99.1  F (37.3  C) Oral - 94 20 90 %   08/25/17 2102 - - - - - - 92 %   08/25/17 2028 138/71 98.5  F (36.9  C) Oral 93 93 20 92 %   08/25/17 1650 139/75 98.1  F (36.7  C) Oral 96 96 24 93 %   08/25/17 1230 156/85 98.1  F (36.7  C) Oral - 99 16 91 %   08/25/17 1151 161/86 98.2  F (36.8  C) Oral 98 98 24 91 %     General Appearance: NAD, laying in bed  Skin: normal, warm, dry  Heart: RRR  Lungs: Diminished bilateral, no cough today  Abdomen: soft, rounded, tender and more tense left side. Ileostomy with sangenous liquid uoput containing clots. Mucus fistula covered. Midline incision, c/d/i.  Right abdominal Drains: both serosang.  LLQ drain: serosang with thick tan opaque material  : Incontinent at times  Extremities: No edema. R index finger with necrotic blacked finger.  Necrotic blackened areas on right 1st & 2nd toes and left 2nd toe.  Neurologic: A&O x4, speech appropriate      Data:   CMP    Recent Labs  Lab 08/26/17  0624 08/25/17  0540 08/24/17  0636  08/21/17  0649    143 139  < > 141   POTASSIUM 5.7* 5.5* 5.5*  < > 5.2   CHLORIDE 109 114* 110*  < > 113*   CO2 24 25 24  < > 23   * 111* 135*  < > 129*   BUN 34* 38* 39*  < > 48*   CR 0.92 0.91 0.90  < > 1.00   GFRESTIMATED 86 87 88  < > 78   GFRESTBLACK >90 >90 >90  < > >90   JACOB  8.1* 8.4* 8.2*  < > 8.6   MAG 2.1 1.8 1.9  < > 1.9   PHOS 3.9 4.3 4.1  < > 3.4   ALBUMIN  --   --  1.7*  --  1.6*   BILITOTAL  --   --  0.4  --  0.4   ALKPHOS  --   --  243*  --  245*   AST  --   --  34  --  39   ALT  --   --  21  --  24   < > = values in this interval not displayed.  CBC    Recent Labs  Lab 08/26/17  0624 08/25/17  0540   HGB 6.9* 7.3*   WBC 3.0* 2.4*   PLT 95* 101*     COAGS    Recent Labs  Lab 08/24/17  1406   INR 1.12      Urinalysis  Recent Labs   Lab Test  08/10/17   1752  08/08/17   1600   06/14/17   1508   04/11/16   1345   COLOR  Yellow  Yellow   < >   --    < >  Yellow   APPEARANCE  Slightly Cloudy  Slightly Cloudy   < >   --    < >  Clear   URINEGLC  Negative  Negative   < >   --    < >  30*   URINEBILI  Small   This is an unconfirmed screening test result. A positive result may be false.  *  Negative   < >   --    < >  Negative   URINEKETONE  Negative  Negative   < >   --    < >  Negative   SG  1.012  1.010   < >   --    < >  1.016   UBLD  Negative  Negative   < >   --    < >  Small*   URINEPH  5.0  5.0   < >   --    < >  5.0   PROTEIN  30*  30*   < >   --    < >  30*   NITRITE  Negative  Negative   < >   --    < >  Negative   LEUKEST  Negative  Negative   < >   --    < >  Negative   RBCU  0  3*   < >   --    < >  1   WBCU  10*  7*   < >   --    < >  1   UTPG   --    --    --   1.55*   --   0.41*    < > = values in this interval not displayed.

## 2017-08-26 NOTE — PROGRESS NOTES
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  Now s/p ex lap with R hemicolectomy, end ileostomy, mucus fistula, partial omenectomy on 7/26.  Protracted recovery with recent concerns for infection, bowel perforation.      Hyperglycemia yesterday related to missing aspart for carbs consumed.  Now NPO today b/c of drop in hgb.  D10 started at 50ml/h midday with BG down to 112.    Plan:  -continue D10 at 50ml/h while NPO or until TFs start.  - glargine 20 units given this morning-- will dc this dose now and reorder in the morning once diet is known.  -NPH decreased to 14 units qPM for TF (RN will given NPH at same time TFs start and hold if TFs do NOT run)  -meal aspart 1unit/10g CHO ordered for meals and snacks-- will request that RN staff keep close eye on intake as pt seems to be forgetting to notify of carb consumption.  -correction aspart: high intensity, qAC and overnight during TF.  -monitor glucose q4h-6h.    Will continue to follow.    Please notify diabetes team of changes planned for nutrition support schedule.     Outpatient diabetes follow up: Dr. Eckert at [facility]               Interval History:   8/7/17 Pt upset about inpatient diabetes mgmt consult.  Transplant contacted pt's outpatient endocrinologist Dr. Eckert of [facility]-- no issue with inpatient team managing pt's glucose    Nepro to 75 cc/h x 14 h [id number]- well-tolerated.    Camacho tolerated po intake yesterday, missed carb coverage leading to hyperglycemia.  TFs started 2 hours later (2000) than scheduled but NPH given in 1800 hour.  Glucose dropped to the 70s around  0100 (even with reduced NPH dose).  There were no documented interruptions to TFs.  Hemoglobin drop this morning, pt now NPO for further w/u. D10 fluids started at 50ml/h midday b/c of NPO status.  When made NPO 2 days ago pt had hypoglycemia with this same dose of glargine on board. He reports having some mild pain in RUQ, and feels hungry.  No other complaints.        Recent Labs  Lab 08/26/17  1447 08/26/17  1231 08/26/17  0847 08/26/17  0624 08/26/17  0323 08/26/17  0205 08/26/17  0126  08/25/17  0540  08/24/17  0636  08/23/17  0640  08/22/17  0640  08/21/17  0649   GLC  --   --   --  163*  --   --   --   --  111*  --  135*  --  139*  --  200*  --  129*   * 112* 163*  --  128* 88 78  < >  --   < >  --   < >  --   < >  --   < >  --    < > = values in this interval not displayed.            Review of Systems:   See interval hx          Medications:   PTA taking Januvia and glargine versus glargine and aspart per carb    Active Diet Order      NPO for Medical/Clinical Reasons Except for: Meds, Ice Chips     Physical Exam:  Gen: alert, resting in bed, NAD.  HEENT: mucous membranes moist, NJ feeding tube in place  Resp: normal, on RA  Neuro: answering appropriately    /88 (BP Location: Left arm)  Pulse 87  Temp 97.9  F (36.6  C) (Oral)  Resp 16  Ht 1.829 m (6')  Wt 98.9 kg (218 lb)  SpO2 98%  BMI 29.57 kg/m2           Data:     Lab Results   Component Value Date    A1C  07/06/2017     Canceled, Test credited   Below Assay Range  NOTIFIED LEONARD ONEILL AT 0538 ON 7/6/17 BY CHRISSY      A1C 9.4 02/16/2017    A1C 6.2 12/14/2016    A1C 6.1 10/27/2016    A1C 8.3 07/02/2012            Recent Labs   Lab Test  08/26/17   0624  08/25/17   0540   NA  140  143   POTASSIUM  5.7*  5.5*   CHLORIDE  109  114*   CO2  24  25   ANIONGAP  6  4   GLC  163*  111*   BUN  34*  38*   CR  0.92  0.91   JACOB  8.1*  8.4*     CBC RESULTS:   Recent Labs   Lab Test  08/26/17   0624   WBC  3.0*   RBC  2.33*   HGB  6.9*   HCT  21.8*    MCV  94   MCH  29.6   MCHC  31.7   RDW  21.3*   PLT  95*       Giovana Villasenor PA-C [phone]  Diabetes Management job code [id number]

## 2017-08-27 ENCOUNTER — RESULTS ONLY (OUTPATIENT)
Dept: GASTROENTEROLOGY | Facility: CLINIC | Age: 52
End: 2017-08-27

## 2017-08-27 LAB
ANION GAP SERPL CALCULATED.3IONS-SCNC: 6 MMOL/L (ref 3–14)
ANISOCYTOSIS BLD QL SMEAR: ABNORMAL
BASOPHILS # BLD AUTO: 0.1 10E9/L (ref 0–0.2)
BASOPHILS NFR BLD AUTO: 3.6 %
BUN SERPL-MCNC: 31 MG/DL (ref 7–30)
CALCIUM SERPL-MCNC: 8.2 MG/DL (ref 8.5–10.1)
CHLORIDE SERPL-SCNC: 110 MMOL/L (ref 94–109)
CO2 SERPL-SCNC: 24 MMOL/L (ref 20–32)
CREAT SERPL-MCNC: 0.96 MG/DL (ref 0.66–1.25)
DIFFERENTIAL METHOD BLD: ABNORMAL
EOSINOPHIL # BLD AUTO: 0 10E9/L (ref 0–0.7)
EOSINOPHIL NFR BLD AUTO: 0 %
ERYTHROCYTE [DISTWIDTH] IN BLOOD BY AUTOMATED COUNT: 20.6 % (ref 10–15)
GFR SERPL CREATININE-BSD FRML MDRD: 82 ML/MIN/1.7M2
GLUCOSE BLDC GLUCOMTR-MCNC: 106 MG/DL (ref 70–99)
GLUCOSE BLDC GLUCOMTR-MCNC: 114 MG/DL (ref 70–99)
GLUCOSE BLDC GLUCOMTR-MCNC: 130 MG/DL (ref 70–99)
GLUCOSE BLDC GLUCOMTR-MCNC: 134 MG/DL (ref 70–99)
GLUCOSE BLDC GLUCOMTR-MCNC: 140 MG/DL (ref 70–99)
GLUCOSE BLDC GLUCOMTR-MCNC: 149 MG/DL (ref 70–99)
GLUCOSE BLDC GLUCOMTR-MCNC: 174 MG/DL (ref 70–99)
GLUCOSE BLDC GLUCOMTR-MCNC: 58 MG/DL (ref 70–99)
GLUCOSE BLDC GLUCOMTR-MCNC: 81 MG/DL (ref 70–99)
GLUCOSE BLDC GLUCOMTR-MCNC: 93 MG/DL (ref 70–99)
GLUCOSE SERPL-MCNC: 129 MG/DL (ref 70–99)
HCT VFR BLD AUTO: 25.5 % (ref 40–53)
HCT VFR BLD AUTO: 27.3 % (ref 40–53)
HGB BLD-MCNC: 8.2 G/DL (ref 13.3–17.7)
HGB BLD-MCNC: 8.6 G/DL (ref 13.3–17.7)
ILEOSCOPY: NORMAL
LACTATE BLD-SCNC: 0.6 MMOL/L (ref 0.7–2)
LYMPHOCYTES # BLD AUTO: 0.4 10E9/L (ref 0.8–5.3)
LYMPHOCYTES NFR BLD AUTO: 15.3 %
MAGNESIUM SERPL-MCNC: 2 MG/DL (ref 1.6–2.3)
MCH RBC QN AUTO: 30.3 PG (ref 26.5–33)
MCHC RBC AUTO-ENTMCNC: 31.5 G/DL (ref 31.5–36.5)
MCV RBC AUTO: 96 FL (ref 78–100)
METAMYELOCYTES # BLD: 0 10E9/L
METAMYELOCYTES NFR BLD MANUAL: 0.9 %
MONOCYTES # BLD AUTO: 0.7 10E9/L (ref 0–1.3)
MONOCYTES NFR BLD AUTO: 28.8 %
NEUTROPHILS # BLD AUTO: 1.3 10E9/L (ref 1.6–8.3)
NEUTROPHILS NFR BLD AUTO: 51.4 %
PHOSPHATE SERPL-MCNC: 5.6 MG/DL (ref 2.5–4.5)
PLATELET # BLD AUTO: 90 10E9/L (ref 150–450)
PLATELET # BLD EST: ABNORMAL 10*3/UL
POTASSIUM BLD-SCNC: 5.9 MMOL/L (ref 3.4–5.3)
POTASSIUM SERPL-SCNC: 5.9 MMOL/L (ref 3.4–5.3)
RBC # BLD AUTO: 2.84 10E12/L (ref 4.4–5.9)
SODIUM SERPL-SCNC: 141 MMOL/L (ref 133–144)
TACROLIMUS BLD-MCNC: 15.6 UG/L (ref 5–15)
TME LAST DOSE: ABNORMAL H
UPPER GI ENDOSCOPY: NORMAL
WBC # BLD AUTO: 2.6 10E9/L (ref 4–11)

## 2017-08-27 PROCEDURE — 25000125 ZZHC RX 250: Performed by: STUDENT IN AN ORGANIZED HEALTH CARE EDUCATION/TRAINING PROGRAM

## 2017-08-27 PROCEDURE — 25000128 H RX IP 250 OP 636: Performed by: RADIOLOGY

## 2017-08-27 PROCEDURE — 93005 ELECTROCARDIOGRAM TRACING: CPT

## 2017-08-27 PROCEDURE — 0DJ08ZZ INSPECTION OF UPPER INTESTINAL TRACT, VIA NATURAL OR ARTIFICIAL OPENING ENDOSCOPIC: ICD-10-PCS | Performed by: INTERNAL MEDICINE

## 2017-08-27 PROCEDURE — 85025 COMPLETE CBC W/AUTO DIFF WBC: CPT | Performed by: STUDENT IN AN ORGANIZED HEALTH CARE EDUCATION/TRAINING PROGRAM

## 2017-08-27 PROCEDURE — 25000131 ZZH RX MED GY IP 250 OP 636 PS 637: Performed by: STUDENT IN AN ORGANIZED HEALTH CARE EDUCATION/TRAINING PROGRAM

## 2017-08-27 PROCEDURE — 36592 COLLECT BLOOD FROM PICC: CPT | Performed by: TRANSPLANT SURGERY

## 2017-08-27 PROCEDURE — G0500 MOD SEDAT ENDO SERVICE >5YRS: HCPCS | Performed by: INTERNAL MEDICINE

## 2017-08-27 PROCEDURE — 25000132 ZZH RX MED GY IP 250 OP 250 PS 637: Performed by: NURSE PRACTITIONER

## 2017-08-27 PROCEDURE — 25800025 ZZH RX 258: Performed by: SURGERY

## 2017-08-27 PROCEDURE — 25000128 H RX IP 250 OP 636: Performed by: INTERNAL MEDICINE

## 2017-08-27 PROCEDURE — 36592 COLLECT BLOOD FROM PICC: CPT | Performed by: STUDENT IN AN ORGANIZED HEALTH CARE EDUCATION/TRAINING PROGRAM

## 2017-08-27 PROCEDURE — 00000146 ZZHCL STATISTIC GLUCOSE BY METER IP

## 2017-08-27 PROCEDURE — 27210432 ZZH NUTRITION PRODUCT RENAL BASIC LITER

## 2017-08-27 PROCEDURE — 25800025 ZZH RX 258: Performed by: STUDENT IN AN ORGANIZED HEALTH CARE EDUCATION/TRAINING PROGRAM

## 2017-08-27 PROCEDURE — 27210913 ZZH NUTRITION PRODUCT INTERMEDIATE PACKET

## 2017-08-27 PROCEDURE — 25000132 ZZH RX MED GY IP 250 OP 250 PS 637: Performed by: PHYSICIAN ASSISTANT

## 2017-08-27 PROCEDURE — 25000128 H RX IP 250 OP 636: Performed by: NURSE PRACTITIONER

## 2017-08-27 PROCEDURE — 25000131 ZZH RX MED GY IP 250 OP 636 PS 637: Performed by: NURSE PRACTITIONER

## 2017-08-27 PROCEDURE — 93010 ELECTROCARDIOGRAM REPORT: CPT | Performed by: INTERNAL MEDICINE

## 2017-08-27 PROCEDURE — 25000132 ZZH RX MED GY IP 250 OP 250 PS 637: Performed by: COLON & RECTAL SURGERY

## 2017-08-27 PROCEDURE — 80048 BASIC METABOLIC PNL TOTAL CA: CPT | Performed by: STUDENT IN AN ORGANIZED HEALTH CARE EDUCATION/TRAINING PROGRAM

## 2017-08-27 PROCEDURE — 25000132 ZZH RX MED GY IP 250 OP 250 PS 637: Performed by: TRANSPLANT SURGERY

## 2017-08-27 PROCEDURE — 44799 UNLISTED PX SMALL INTESTINE: CPT | Performed by: INTERNAL MEDICINE

## 2017-08-27 PROCEDURE — 83735 ASSAY OF MAGNESIUM: CPT | Performed by: STUDENT IN AN ORGANIZED HEALTH CARE EDUCATION/TRAINING PROGRAM

## 2017-08-27 PROCEDURE — 25000132 ZZH RX MED GY IP 250 OP 250 PS 637: Performed by: SURGERY

## 2017-08-27 PROCEDURE — 80197 ASSAY OF TACROLIMUS: CPT | Performed by: STUDENT IN AN ORGANIZED HEALTH CARE EDUCATION/TRAINING PROGRAM

## 2017-08-27 PROCEDURE — 25000132 ZZH RX MED GY IP 250 OP 250 PS 637: Performed by: INTERNAL MEDICINE

## 2017-08-27 PROCEDURE — 25800025 ZZH RX 258: Performed by: TRANSPLANT SURGERY

## 2017-08-27 PROCEDURE — 83605 ASSAY OF LACTIC ACID: CPT | Performed by: TRANSPLANT SURGERY

## 2017-08-27 PROCEDURE — 84132 ASSAY OF SERUM POTASSIUM: CPT | Performed by: TRANSPLANT SURGERY

## 2017-08-27 PROCEDURE — 25000132 ZZH RX MED GY IP 250 OP 250 PS 637: Performed by: STUDENT IN AN ORGANIZED HEALTH CARE EDUCATION/TRAINING PROGRAM

## 2017-08-27 PROCEDURE — 25000128 H RX IP 250 OP 636: Performed by: STUDENT IN AN ORGANIZED HEALTH CARE EDUCATION/TRAINING PROGRAM

## 2017-08-27 PROCEDURE — 84100 ASSAY OF PHOSPHORUS: CPT | Performed by: STUDENT IN AN ORGANIZED HEALTH CARE EDUCATION/TRAINING PROGRAM

## 2017-08-27 PROCEDURE — 43235 EGD DIAGNOSTIC BRUSH WASH: CPT | Performed by: INTERNAL MEDICINE

## 2017-08-27 PROCEDURE — 0DJD8ZZ INSPECTION OF LOWER INTESTINAL TRACT, VIA NATURAL OR ARTIFICIAL OPENING ENDOSCOPIC: ICD-10-PCS | Performed by: INTERNAL MEDICINE

## 2017-08-27 PROCEDURE — 25000128 H RX IP 250 OP 636: Performed by: PHYSICIAN ASSISTANT

## 2017-08-27 PROCEDURE — 99153 MOD SED SAME PHYS/QHP EA: CPT | Performed by: INTERNAL MEDICINE

## 2017-08-27 PROCEDURE — 25000128 H RX IP 250 OP 636: Performed by: TRANSPLANT SURGERY

## 2017-08-27 PROCEDURE — 12000025 ZZH R&B TRANSPLANT INTERMEDIATE

## 2017-08-27 RX ORDER — AMOXICILLIN AND CLAVULANATE POTASSIUM 400; 57 MG/5ML; MG/5ML
875 POWDER, FOR SUSPENSION ORAL 2 TIMES DAILY
Status: DISCONTINUED | OUTPATIENT
Start: 2017-08-27 | End: 2017-08-27 | Stop reason: CLARIF

## 2017-08-27 RX ORDER — AMLODIPINE BESYLATE 10 MG/1
10 TABLET ORAL DAILY
Status: DISCONTINUED | OUTPATIENT
Start: 2017-08-28 | End: 2017-09-08 | Stop reason: HOSPADM

## 2017-08-27 RX ORDER — DOXYCYCLINE 100 MG/1
100 CAPSULE ORAL 2 TIMES DAILY
Status: COMPLETED | OUTPATIENT
Start: 2017-08-27 | End: 2017-08-29

## 2017-08-27 RX ORDER — LOPERAMIDE HYDROCHLORIDE 1 MG/5ML
4 SOLUTION ORAL 4 TIMES DAILY
Status: DISCONTINUED | OUTPATIENT
Start: 2017-08-27 | End: 2017-08-27 | Stop reason: CLARIF

## 2017-08-27 RX ORDER — DIPHENHYDRAMINE HYDROCHLORIDE 50 MG/ML
INJECTION INTRAMUSCULAR; INTRAVENOUS PRN
Status: DISCONTINUED | OUTPATIENT
Start: 2017-08-27 | End: 2017-08-27 | Stop reason: HOSPADM

## 2017-08-27 RX ORDER — SIMETHICONE
LIQUID (ML) MISCELLANEOUS PRN
Status: DISCONTINUED | OUTPATIENT
Start: 2017-08-27 | End: 2017-08-27 | Stop reason: HOSPADM

## 2017-08-27 RX ORDER — DIPHENOXYLATE HCL/ATROPINE 2.5-.025MG
1 TABLET ORAL 2 TIMES DAILY
Status: DISCONTINUED | OUTPATIENT
Start: 2017-08-27 | End: 2017-08-29

## 2017-08-27 RX ORDER — FUROSEMIDE 10 MG/ML
40 INJECTION INTRAMUSCULAR; INTRAVENOUS ONCE
Status: COMPLETED | OUTPATIENT
Start: 2017-08-27 | End: 2017-08-27

## 2017-08-27 RX ORDER — OXYCODONE HYDROCHLORIDE 5 MG/1
5 TABLET ORAL 2 TIMES DAILY PRN
Status: DISCONTINUED | OUTPATIENT
Start: 2017-08-27 | End: 2017-08-31

## 2017-08-27 RX ORDER — PANTOPRAZOLE SODIUM 20 MG/1
40 TABLET, DELAYED RELEASE ORAL DAILY
Status: DISCONTINUED | OUTPATIENT
Start: 2017-08-28 | End: 2017-09-08 | Stop reason: HOSPADM

## 2017-08-27 RX ORDER — TACROLIMUS 1 MG/1
1 CAPSULE ORAL
Status: DISCONTINUED | OUTPATIENT
Start: 2017-08-28 | End: 2017-08-29

## 2017-08-27 RX ORDER — LOPERAMIDE HCL 2 MG
4 CAPSULE ORAL 4 TIMES DAILY
Status: DISCONTINUED | OUTPATIENT
Start: 2017-08-27 | End: 2017-08-29

## 2017-08-27 RX ADMIN — HEPARIN SODIUM 5000 UNITS: 5000 INJECTION, SOLUTION INTRAVENOUS; SUBCUTANEOUS at 05:00

## 2017-08-27 RX ADMIN — AMOXICILLIN AND CLAVULANATE POTASSIUM 875 MG: 400; 57 POWDER, FOR SUSPENSION ORAL at 16:09

## 2017-08-27 RX ADMIN — Medication 250 MG: at 08:38

## 2017-08-27 RX ADMIN — LOPERAMIDE HYDROCHLORIDE 4 MG: 2 SOLUTION ORAL at 08:37

## 2017-08-27 RX ADMIN — Medication 1 PACKET: at 08:39

## 2017-08-27 RX ADMIN — Medication 15 ML: at 19:47

## 2017-08-27 RX ADMIN — ONDANSETRON 4 MG: 2 INJECTION INTRAMUSCULAR; INTRAVENOUS at 20:20

## 2017-08-27 RX ADMIN — TACROLIMUS 3 MG: 1 CAPSULE ORAL at 08:38

## 2017-08-27 RX ADMIN — Medication 25 ML: at 23:03

## 2017-08-27 RX ADMIN — CALCIUM GLUCONATE 50 ML/HR: 94 INJECTION, SOLUTION INTRAVENOUS at 21:06

## 2017-08-27 RX ADMIN — LOPERAMIDE HYDROCHLORIDE 4 MG: 2 CAPSULE ORAL at 19:47

## 2017-08-27 RX ADMIN — Medication 5 ML: at 08:38

## 2017-08-27 RX ADMIN — LOPERAMIDE HYDROCHLORIDE 4 MG: 2 CAPSULE ORAL at 16:15

## 2017-08-27 RX ADMIN — Medication 250 MG: at 19:46

## 2017-08-27 RX ADMIN — FLUDROCORTISONE ACETATE 0.1 MG: 0.1 TABLET ORAL at 08:38

## 2017-08-27 RX ADMIN — Medication 15 ML: at 16:11

## 2017-08-27 RX ADMIN — AMLODIPINE BESYLATE 10 MG: 10 TABLET ORAL at 08:37

## 2017-08-27 RX ADMIN — SODIUM BICARBONATE 650 MG TABLET 1300 MG: at 19:46

## 2017-08-27 RX ADMIN — SODIUM BICARBONATE 650 MG TABLET 1300 MG: at 08:36

## 2017-08-27 RX ADMIN — DOXYCYCLINE HYCLATE 100 MG: 100 CAPSULE ORAL at 19:46

## 2017-08-27 RX ADMIN — DOXYCYCLINE CALCIUM 100 MG: 50 SYRUP ORAL at 08:38

## 2017-08-27 RX ADMIN — TACROLIMUS 3 MG: 1 CAPSULE ORAL at 18:02

## 2017-08-27 RX ADMIN — HEPARIN SODIUM 5000 UNITS: 5000 INJECTION, SOLUTION INTRAVENOUS; SUBCUTANEOUS at 21:06

## 2017-08-27 RX ADMIN — HEPARIN SODIUM 5000 UNITS: 5000 INJECTION, SOLUTION INTRAVENOUS; SUBCUTANEOUS at 13:23

## 2017-08-27 RX ADMIN — ACETAMINOPHEN 650 MG: 325 SOLUTION ORAL at 20:21

## 2017-08-27 RX ADMIN — DIPHENOXYLATE HYDROCHLORIDE AND ATROPINE SULFATE 1 TABLET: 2.5; .025 TABLET ORAL at 19:47

## 2017-08-27 RX ADMIN — DEXTROSE MONOHYDRATE: 100 INJECTION, SOLUTION INTRAVENOUS at 06:33

## 2017-08-27 RX ADMIN — FUROSEMIDE 40 MG: 10 INJECTION, SOLUTION INTRAVENOUS at 08:33

## 2017-08-27 RX ADMIN — AMOXICILLIN AND CLAVULANATE POTASSIUM 1 TABLET: 875; 125 TABLET, FILM COATED ORAL at 19:46

## 2017-08-27 NOTE — PROVIDER NOTIFICATION
A member of the team (medical student) just now was verbally notified that Camacho' potassium level=5.9, up from 5.7 yesterday (which had been treated with 20 mg IV Lasix yesterday): 40 mg Lasix just now ordered for today; and that Camacho' magnesium level=2.0 (down from 2.1 yesterday): no treatment for today's magnesium.

## 2017-08-27 NOTE — PROGRESS NOTES
Transplant Surgery  Inpatient Daily Progress Note  08/27/2017    Assessment & Plan: Camacho Bhagat is a 52 year old male with history of CASTAÑEDA cirrhosis s/p liver transplant on 3/4/17. Admitted 7/4/17 from [facility] with sepsis secondary to colitis, taken to OR for initial ex-lap with findings of typhlitis in the right colon, wound left open with wound vac in place for re exploration and interval closure on 7/5/17. Concern for CMV colitis with low count CMV viremia, underwent colonoscopy on 7/21/17, biopsies obtained.  On 7/25/17 patient became septic with abdominal compartment syndrome.  CT noted new large heterogeneous right sided retroperitoneal gas and air tracking along duodenum, and patient underwent an ex-lap, right angelique-colectomy, end ileostomy, mucus fistula, partial omentectomy on 7/26/17 by colorectal surgery.  Post-op course complicated by retroperitoneal abscess/peritonitis requiring percutaneous drainage.    Graft Function: Good function. LFTs acceptable (checking every M, Th).   Immunosuppression Management:   CellCept discontinued (6/27/17) due to neutropenia.   Prograf 3 BID goal ~5-6 due to sepsis. Level 8/27 15.5; will reduce morning dose to 1mg and then adjust evening dose according to levels tomorrow  Cardiorespiratory:   -Suspected RONNIE:  Uses O2 overnight only for brief apnea while sleeping per nursing.  -HTN: SBP 140s-160s. Amlodipine increased this week. Pt on daily Florinef daily for hyperkalemia. Continue lasix.   -R/o HCAP:  New productive cough on 8/24, no fever.  CXR: retrocardiac opacity.  Already on Augmentin.  MRSA swab negative.  Add duonebs and mycomust x2 days.  Out of bed.  Aggressive pulmonary toilet.  Hematology: Pancytopenia present on admission, persists.  Contributing factors include medications and CMV infection.  -Anemia- Hemoglobin 8.6 stable today. Dropped below 7 yesterday with bloody ileostomy output. ASA held. Last blood transfusion on 8/26 (2 units).    -Leukopenia/neutropenia- WBC 2.6, ANC 1.7 -> 1.3.  Received GCSF 8/20, 8/22-25.  -Thrombocytopenia-  Plt 90 today, previously dropped with linezolid.  Now increasing.  GI:   -Neutropenic colitis on admission. Colonoscopy 7/21. Biopsy: resolving injury secondary to possible drug induced vs infectious.    -Bowel perforation: 7/25 CT scan abd/pelvis-new large heterogeneous right sided retroperitoneal gas and air tracking along duodenum.  No contrast extravasation.  No intraperitoneal air noted. Colorectal surgery performed Ex lap, right angelique-colectomy, end ileostomy, mucus fistula, partial omentectomy on 7/26. Findings no neida perforation, phlegmon/inflammation right colon. Colon pathology suggested colon perforation, negative for malignancy; CMV stain positive.    -Retroperitoneal abscess/ peritonitis: See ID section below.  -Diet:  Resume low K diet with aspiration precautions while eating.  Nepro NJ feeds, cycled.  Magen counts.  -High output  ileostomy:  Goal ileostomy output ~ 1200 cc in 24 hrs. Ileostomy output sanguinous liquid with clots 8/26. Appreciate GI recs.  Ileoscopy and EGD today showed @@@. Continue loperamide 4mg QID, Lomotil BID, and fiber BID.   Fluid/Electrolytes/Renal:   -EJ: on admission, d/t ATN sec to sepsis. Now resolved, Cr < 1.  -Hypernatremia: resolved with free water.    -Hyperkalemia: Neph consulted earlier in hospitalization. 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 on lasix daily bicarb, florinef, low K diet. K 5.9 today treated with 40 lasix IV and recheck.    Endocrine: DM type 2. Endocrine consulting for glycemic management.  Hypoglycemic while NPO.  Infectious disease: Afebrile.  WBC 2.6, ID following.  -Retroperitoneal abscess/peritonitis: CT C/A/P 8/9 showed a persistent gas/fluid collection RLQ, peritonitis, some free air but no evidence of contrast extravasation.  8/9 retroperitoneal drain placed, cx + clostridium septicum.  8/11 paracentesis: WBC 5,038, drain cultures + clostridium septicum (day  6, broth only).  8/14 CT scan abd/pelvis: Complex gas-containing fluid collection in the right retroperitoneum decreased slightly in size, moderate ascites with intraperitoneal gas, peritonitis noted, no bowel leak.  8/15 peritoneal drain placement: WBC 15,680, culture negative.  8/23 CT:  Fluid collections slightly larger.  Patient clinically worse with increased AMS.  8/24 IR replaced retroperitoneal drain, placed new 12F LLQ drain.  Patient improved clinically after drains placed.   Will send fluid culture from LLQ drain.  On zosyn (8/9-8/22), linezolid (8/9-8/15), switched to Augmentin (8/22-present).  -CMV viremia: Primary CMV infection.  (CMV R-D+) CMV colitis per pathology, peak serum 7/15 4225 IU/ml.   IV Ganciclovir (started 7/20).  CMV count fell to <137. Decreased Ganciclovir to 5mg/kg on 8/18.  Switched to valcyte 900mg daily 8/22, continue until 8/26.  Check CMV quant weekly until 9/24 and then every other week for 2 months. Last check 8/24 CMV not detected..  -EBV viremia:  Peak serum 406,697 copies/ml on 7/18.   753 copies/ml on 8/15.  Check quant monthly, due 9/15.  -Cellulitis/dry gangrene:  Right 1st finger, on linezolid (8/19- 8/22), switched to doxycycline x7 days (8/22-8/28).  Clinically resolved.  -R/o HCAP: As above.  Infectious disease resolved issues:  -Severe sepsis: On admission, secondary to right colon phlegmon. Meropenem/daptomycin/micafungin tx x 10 days completed on 8/3. S/p right angelique-colectomy.  Path: CMV stain +, perforation of colon noted.   Neuro: Toxic metabolic encephalopathy secondary to infection, prolonged hospital stay.  Improved today.  Vascular:  Microvascular ischemic injury in digits (toes, finger) this is most likely due to injury while patient was on pressor therapy with sepsis. Erythema right 1st finger documented and marked, now resolved.   Activity: Deconditioned due to prolong hospital course. Daily PT/OT, encourage pt to be OOB to chair at least TID.    Prophylaxis: DVT-Heparin SQ  Disposition: 7A.     Medical Decision Making: Medium    PILLO/Fellow/Resident Provider: Eric Dacosta  Fellow, Transplant surgery  Pager: [phone]    Faculty: Drew Dalal MD     __________________________________________________________________  Interval History:   Overnight events:   Hgb stable at 8.6 on recheck. Feels alright. NPO today for EGD. Little nausea due to NPO status. Good UOP and stooling well. 24Fr RP drain with 210 out.        ROS:   A 10-point review of systems was negative except as noted above.    Meds:    furosemide  40 mg Intravenous Once     insulin isophane human  14 Units Subcutaneous QPM     menthol   Transdermal Q8H     tacrolimus  3 mg Oral BID IS     diphenoxylate-atropine  5 mL Oral BID     insulin aspart  1-12 Units Subcutaneous 5x Daily     amoxicillin-clavulanate  875 mg Oral BID     doxycycline  100 mg Oral BID     amLODIPine  10 mg Oral Daily     protein modular  1 packet Per Feeding Tube Daily     insulin aspart   Subcutaneous TID w/meals     artificial saliva  15 mL Swish & Spit 4x Daily     heparin  5,000 Units Subcutaneous Q8H     fludrocortisone  0.1 mg Oral Daily     sodium chloride (PF)  10 mL Irrigation Q8H     fiber modular  1 packet Per Feeding Tube BID     sodium chloride (PF)  20 mL Irrigation Q6H     saccharomyces boulardii  250 mg Oral BID     sodium bicarbonate  1,300 mg Per NG tube TID     miconazole with skin protectant   Topical BID     loperamide  4 mg Oral or Feeding Tube 4x Daily     sodium chloride (PF)  3 mL Intracatheter Q8H       Physical Exam:     Admit Weight: 94.2 kg (207 lb 11.2 oz) (SCALE 2)    Current vitals:   /81 (BP Location: Left arm)  Pulse 87  Temp 97.8  F (36.6  C) (Oral)  Resp 18  Ht 1.829 m (6')  Wt 98.9 kg (218 lb)  SpO2 92%  BMI 29.57 kg/m2    Vital sign ranges:    Temp:  [97.8  F (36.6  C)-98.7  F (37.1  C)] 97.8  F (36.6  C)  Pulse:  [87-91] 87  Heart Rate:  [86-97] 86  Resp:  [16-20] 18  BP:  (131-171)/(78-97) 146/81  SpO2:  [92 %-98 %] 92 %  Patient Vitals for the past 24 hrs:   BP Temp Temp src Pulse Heart Rate Resp SpO2   08/27/17 0745 146/81 97.8  F (36.6  C) Oral - 86 18 92 %   08/27/17 0322 149/85 97.8  F (36.6  C) Oral - 91 18 97 %   08/27/17 0022 156/89 - - - 95 - -   08/26/17 2316 (!) 171/97 98.3  F (36.8  C) Oral - 97 18 95 %   08/26/17 2022 (!) 160/91 97.9  F (36.6  C) Oral - 89 18 95 %   08/26/17 1751 (!) 157/96 98.1  F (36.7  C) Oral 87 87 20 96 %   08/26/17 1444 150/88 97.9  F (36.6  C) Oral 87 87 16 98 %   08/26/17 1352 142/84 98.3  F (36.8  C) Oral - 88 16 98 %   08/26/17 1331 145/78 98.6  F (37  C) Oral - 90 16 96 %   08/26/17 1231 (!) 131/92 98  F (36.7  C) Oral - 90 16 -   08/26/17 1055 149/82 98.7  F (37.1  C) Oral - 91 16 97 %   08/26/17 1035 154/88 98.6  F (37  C) Oral - 91 16 96 %   08/26/17 0845 153/84 98.5  F (36.9  C) Oral 91 91 20 92 %     General Appearance: NAD, laying in bed  Skin: normal, warm, dry  Heart: RRR  Lungs: Diminished bilateral, no cough today  Abdomen: soft, rounded, tender and more tense left side. Ileostomy liquid brown output.  Mucus fistula covered. Midline incision, c/d/i.  Right abdominal Drains: both serosang.  LLQ drain: serosang with thick tan opaque material  : Incontinent at times  Extremities: No edema. R index finger with necrotic blacked finger.  Necrotic blackened areas on right 1st & 2nd toes and left 2nd toe.  Neurologic: A&O x4, speech appropriate      Data:   CMP    Recent Labs  Lab 08/27/17  0630 08/26/17  0624  08/24/17  0636  08/21/17  0649    140  < > 139  < > 141   POTASSIUM 5.9* 5.7*  < > 5.5*  < > 5.2   CHLORIDE 110* 109  < > 110*  < > 113*   CO2 24 24  < > 24  < > 23   * 163*  < > 135*  < > 129*   BUN 31* 34*  < > 39*  < > 48*   CR 0.96 0.92  < > 0.90  < > 1.00   GFRESTIMATED 82 86  < > 88  < > 78   GFRESTBLACK >90 >90  < > >90  < > >90   JACOB 8.2* 8.1*  < > 8.2*  < > 8.6   MAG 2.0 2.1  < > 1.9  < > 1.9   PHOS 5.6* 3.9  <  > 4.1  < > 3.4   ALBUMIN  --   --   --  1.7*  --  1.6*   BILITOTAL  --   --   --  0.4  --  0.4   ALKPHOS  --   --   --  243*  --  245*   AST  --   --   --  34  --  39   ALT  --   --   --  21  --  24   < > = values in this interval not displayed.  CBC    Recent Labs  Lab 08/27/17  0630 08/26/17  2341 08/26/17  1708   HGB 8.6* 8.2* 8.1*   WBC 2.6*  --  2.9*   PLT 90*  --  88*     COAGS    Recent Labs  Lab 08/24/17  1406   INR 1.12      Urinalysis  Recent Labs   Lab Test  08/10/17   1752  08/08/17   1600   06/14/17   1508   04/11/16   1345   COLOR  Yellow  Yellow   < >   --    < >  Yellow   APPEARANCE  Slightly Cloudy  Slightly Cloudy   < >   --    < >  Clear   URINEGLC  Negative  Negative   < >   --    < >  30*   URINEBILI  Small   This is an unconfirmed screening test result. A positive result may be false.  *  Negative   < >   --    < >  Negative   URINEKETONE  Negative  Negative   < >   --    < >  Negative   SG  1.012  1.010   < >   --    < >  1.016   UBLD  Negative  Negative   < >   --    < >  Small*   URINEPH  5.0  5.0   < >   --    < >  5.0   PROTEIN  30*  30*   < >   --    < >  30*   NITRITE  Negative  Negative   < >   --    < >  Negative   LEUKEST  Negative  Negative   < >   --    < >  Negative   RBCU  0  3*   < >   --    < >  1   WBCU  10*  7*   < >   --    < >  1   UTPG   --    --    --   1.55*   --   0.41*    < > = values in this interval not displayed.

## 2017-08-27 NOTE — PROCEDURES
Brief procedure note    EGD and ileoscopy via stoma performed under conscious sedation in endoscopy suite.    Esophagus- normal- no bleeding or esophagitis    Stomach- normal- no bleeding, ulcer or gastritis    Duodenum- normal- no bleeding, ulcer or inflammation    Ileum- scope advanced 25cm from ileostomy- liquid bilious stool, no bleeding or ulceration identified; small amount of oozing with contact at stoma site    Assessment  Acute anemia, etiology unclear  No evidence of GI bleeding    Recommend  1.  Discontinue PPI infusion  2.  Advance diet as tolerated  3.  Resume tube feeds  4.  Consider capsule endoscopy if significant bleeding noted in stoma output    Discussed with Dr Katlin Gonsalez MD  Hepatology staff

## 2017-08-27 NOTE — PLAN OF CARE
Problem: Goal Outcome Summary  Goal: Goal Outcome Summary  Outcome: No Change  BPs elevated 140s-170s, but not meeting parameters for PRN BP medication. OVSS on 2 L NC. Confused but not impulsive, bed alarm activated for safety.  Hgb recheck 8.2 , 140 and 140. NPO for possible EGD today. TF stopped at 0500 per orders. D10 and protonix gtt infusing into PICC. PIV saline locked. C/o abdominal pain, PRN oxycodone given x1. C/o nausea, PRN zofran and one time dose of compazine given with some relief. Ileostomy w/ copious output, red streaking noted in stool earlier in shift, now appearing brown/yellow in color. Voiding. Drains irrigated per orders. Turned q2 hrs. Will continue POC.

## 2017-08-27 NOTE — PLAN OF CARE
Problem: Goal Outcome Summary  Goal: Goal Outcome Summary  Outcome: No Change  D: Camacho says he was not feeling as good or energetic today as he felt yesterday; his hemoglobin level was only 6.9, so  I:   he received 2 units of PRBC's today.  He was encouraged to continue to get up and do therapy--even only sit in a chair, but:  A:  Camacho refused all attempts to get him OOB today, even telling therapy not to come back until Monday.  He said he did begin to feel a little better after the units of blood were infused.  Hemog;mich went up to 8.1,  P:  another draw due ~ midnight...

## 2017-08-27 NOTE — PLAN OF CARE
Problem: Goal Outcome Summary  Goal: Goal Outcome Summary  Outcome: No Change  D: Camacho' VSS. His potassium level was a little bit higher this morning than it was yesterday morning (5.9, up from 5.7).  EGD planned for this morning.  I:   A higher dose of IV Lasix was given this morning to counteract Camacho' potassium level.  Camacho went for his EGD.  Shortly after returning from the EGD, he seemed to have dislodged his NJTube, so that there whole thing came out from his mouth.  A:  Camacho is urinating larger amounts of urine now, from the Lasix.  He will have to have the feeding tube replaced: however, x-ray cannot do it for him until tomorrow, so he will have to take most of his medications by mouth instead of FT elixirs (Pharmacy will make the change soon...).  And NPH insulin is being cancelled, per Endo, for tonight.  P:  Keep close watch on Camacho' blood sugars until tomorrow's planned FT is placed.

## 2017-08-27 NOTE — PROGRESS NOTES
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  Now s/p ex lap with R hemicolectomy, end ileostomy, mucus fistula, partial omenectomy on 7/26.  Protracted recovery with recent concerns for infection, bowel perforation.      BG in low 100s midday today while pt was NPO and no basal insulin on board (dextrose fluids stopped this morning).  Diet now restarting.  FT pulled out, will not be replaced until tomorrow.      Plan:    - glargine restarted this afternoon but at half his usual dose: 10 units q24h-- will increase tomorrow depending on glucoses.  -NPH discontinued for now b/c TFs will not run tonight.  Will reorder tomorrow once FT is replaced.  -meal aspart 1unit/10g CHO for meals and snacks  -correction aspart: high intensity- will continue ac and overnight (even though TFs will not run-- will keep in case glucoses running high with lower glargine dose on board).  -monitor glucose q4h-6h.    Will continue to follow.    Please notify diabetes team of changes planned for nutrition support schedule.     Outpatient diabetes follow up: Dr. Eckert at [facility]               Interval History:   8/7/17 Pt upset about inpatient diabetes mgmt consult.  Transplant contacted pt's outpatient endocrinologist Dr. Eckert of [facility]-- no issue with inpatient team managing pt's glucose    Nepro to 75 cc/h x 14 h [id number] last night but off at 0500 in preparation for EGD via ileostomy today.  No glargine given this morning b/c pt remained NPO.  Glucose in the low 100s midday with no basal insulin on board.  No source of bleeding found on EGD.  Diet  restarted this afternoon.  He has had rice with milk so far.  Aspart given for carbs and glargine restarted but at half his usual dose given that he is getting it later in the day and po intake may be lower than usual.  He pulled out FT this afternoon after sedation from EGD was wearing off.  IR will replace tomorrow- so no TFs tonight.        Recent Labs  Lab 08/27/17  1533 08/27/17  1146 08/27/17  1043 08/27/17  0748 08/27/17  0630 08/27/17  0332 08/26/17  2322  08/26/17  0624  08/25/17  0540  08/24/17  0636  08/23/17  0640  08/22/17  0640   GLC  --   --   --   --  129*  --   --   --  163*  --  111*  --  135*  --  139*  --  200*   * 106* 114* 134*  --  140* 140*  < >  --   < >  --   < >  --   < >  --   < >  --    < > = values in this interval not displayed.            Review of Systems:   See interval hx          Medications:   PTA taking Januvia and glargine versus glargine and aspart per carb    Active Diet Order      2 Gram K Diet     Physical Exam:  Gen: alert, resting in bed, NAD.  HEENT: mucous membranes moist, NJ feeding tube absent  Resp: normal, on RA  Neuro: answering appropriately    /88 (BP Location: Left arm)  Pulse 87  Temp 98.1  F (36.7  C) (Oral)  Resp 18  Ht 1.829 m (6')  Wt 100.5 kg (221 lb 8 oz)  SpO2 90%  BMI 30.04 kg/m2           Data:     Lab Results   Component Value Date    A1C  07/06/2017     Canceled, Test credited   Below Assay Range  NOTIFIED LEONARD ONEILL AT 0538 ON 7/6/17 BY CHRISSY      A1C 9.4 02/16/2017    A1C 6.2 12/14/2016    A1C 6.1 10/27/2016    A1C 8.3 07/02/2012            Recent Labs   Lab Test  08/27/17   1155  08/27/17   0630  08/26/17   0624   NA   --   141  140   POTASSIUM  5.9*  5.9*  5.7*   CHLORIDE   --   110*  109   CO2   --   24  24   ANIONGAP   --   6  6   GLC   --   129*  163*   BUN   --   31*  34*   CR   --   0.96  0.92   JACOB   --   8.2*  8.1*     CBC RESULTS:   Recent Labs   Lab Test  08/27/17   0630   WBC  2.6*   RBC  2.84*   HGB  8.6*   HCT   27.3*   MCV  96   MCH  30.3   MCHC  31.5   RDW  20.6*   PLT  90*       Giovana Villasenor PA-C [phone]  Diabetes Management job code [id number]

## 2017-08-27 NOTE — PROGRESS NOTES
Calorie Count  Intake recorded for 8/26/17. Kcals: 240  Protein: 4g  # Meals Recorded: 16oz apple juice  # Supplements Recorded 0

## 2017-08-28 ENCOUNTER — APPOINTMENT (OUTPATIENT)
Dept: OCCUPATIONAL THERAPY | Facility: CLINIC | Age: 52
End: 2017-08-28
Attending: TRANSPLANT SURGERY
Payer: COMMERCIAL

## 2017-08-28 LAB
ALBUMIN SERPL-MCNC: 1.8 G/DL (ref 3.4–5)
ALP SERPL-CCNC: 251 U/L (ref 40–150)
ALT SERPL W P-5'-P-CCNC: 19 U/L (ref 0–70)
ANION GAP SERPL CALCULATED.3IONS-SCNC: 6 MMOL/L (ref 3–14)
ANISOCYTOSIS BLD QL SMEAR: ABNORMAL
AST SERPL W P-5'-P-CCNC: 44 U/L (ref 0–45)
BASOPHILS # BLD AUTO: 0 10E9/L (ref 0–0.2)
BASOPHILS NFR BLD AUTO: 1.8 %
BILIRUB DIRECT SERPL-MCNC: 0.2 MG/DL (ref 0–0.2)
BILIRUB SERPL-MCNC: 0.6 MG/DL (ref 0.2–1.3)
BUN SERPL-MCNC: 29 MG/DL (ref 7–30)
CALCIUM SERPL-MCNC: 8.1 MG/DL (ref 8.5–10.1)
CHLORIDE SERPL-SCNC: 108 MMOL/L (ref 94–109)
CO2 SERPL-SCNC: 25 MMOL/L (ref 20–32)
CREAT SERPL-MCNC: 0.97 MG/DL (ref 0.66–1.25)
DIFFERENTIAL METHOD BLD: ABNORMAL
EOSINOPHIL # BLD AUTO: 0 10E9/L (ref 0–0.7)
EOSINOPHIL NFR BLD AUTO: 0 %
ERYTHROCYTE [DISTWIDTH] IN BLOOD BY AUTOMATED COUNT: 20.4 % (ref 10–15)
GFR SERPL CREATININE-BSD FRML MDRD: 81 ML/MIN/1.7M2
GLUCOSE BLDC GLUCOMTR-MCNC: 101 MG/DL (ref 70–99)
GLUCOSE BLDC GLUCOMTR-MCNC: 108 MG/DL (ref 70–99)
GLUCOSE BLDC GLUCOMTR-MCNC: 109 MG/DL (ref 70–99)
GLUCOSE BLDC GLUCOMTR-MCNC: 110 MG/DL (ref 70–99)
GLUCOSE BLDC GLUCOMTR-MCNC: 124 MG/DL (ref 70–99)
GLUCOSE BLDC GLUCOMTR-MCNC: 229 MG/DL (ref 70–99)
GLUCOSE BLDC GLUCOMTR-MCNC: 58 MG/DL (ref 70–99)
GLUCOSE BLDC GLUCOMTR-MCNC: 61 MG/DL (ref 70–99)
GLUCOSE BLDC GLUCOMTR-MCNC: 83 MG/DL (ref 70–99)
GLUCOSE BLDC GLUCOMTR-MCNC: 90 MG/DL (ref 70–99)
GLUCOSE BLDC GLUCOMTR-MCNC: 99 MG/DL (ref 70–99)
GLUCOSE SERPL-MCNC: 92 MG/DL (ref 70–99)
HCT VFR BLD AUTO: 24 % (ref 40–53)
HCT VFR BLD AUTO: 24.9 % (ref 40–53)
HGB BLD-MCNC: 8 G/DL (ref 13.3–17.7)
HGB BLD-MCNC: 8 G/DL (ref 13.3–17.7)
LYMPHOCYTES # BLD AUTO: 0.6 10E9/L (ref 0.8–5.3)
LYMPHOCYTES NFR BLD AUTO: 23 %
MAGNESIUM SERPL-MCNC: 1.7 MG/DL (ref 1.6–2.3)
MCH RBC QN AUTO: 30.3 PG (ref 26.5–33)
MCHC RBC AUTO-ENTMCNC: 32.1 G/DL (ref 31.5–36.5)
MCV RBC AUTO: 94 FL (ref 78–100)
METAMYELOCYTES # BLD: 0.1 10E9/L
METAMYELOCYTES NFR BLD MANUAL: 2.7 %
MICROCYTES BLD QL SMEAR: PRESENT
MONOCYTES # BLD AUTO: 0.5 10E9/L (ref 0–1.3)
MONOCYTES NFR BLD AUTO: 18.6 %
NEUTROPHILS # BLD AUTO: 1.4 10E9/L (ref 1.6–8.3)
NEUTROPHILS NFR BLD AUTO: 53 %
OVALOCYTES BLD QL SMEAR: SLIGHT
PHOSPHATE SERPL-MCNC: 4.2 MG/DL (ref 2.5–4.5)
PLATELET # BLD AUTO: 82 10E9/L (ref 150–450)
PLATELET # BLD EST: ABNORMAL 10*3/UL
POIKILOCYTOSIS BLD QL SMEAR: SLIGHT
POTASSIUM SERPL-SCNC: 5 MMOL/L (ref 3.4–5.3)
POTASSIUM SERPL-SCNC: 5.5 MMOL/L (ref 3.4–5.3)
PROMYELOCYTES # BLD MANUAL: 0 10E9/L
PROMYELOCYTES NFR BLD MANUAL: 0.9 %
PROT SERPL-MCNC: 6.5 G/DL (ref 6.8–8.8)
RBC # BLD AUTO: 2.64 10E12/L (ref 4.4–5.9)
SODIUM SERPL-SCNC: 140 MMOL/L (ref 133–144)
TACROLIMUS BLD-MCNC: 13.1 UG/L (ref 5–15)
TME LAST DOSE: NORMAL H
WBC # BLD AUTO: 2.7 10E9/L (ref 4–11)

## 2017-08-28 PROCEDURE — 12000026 ZZH R&B TRANSPLANT

## 2017-08-28 PROCEDURE — 36592 COLLECT BLOOD FROM PICC: CPT

## 2017-08-28 PROCEDURE — 85018 HEMOGLOBIN: CPT

## 2017-08-28 PROCEDURE — 97110 THERAPEUTIC EXERCISES: CPT | Mod: GO

## 2017-08-28 PROCEDURE — 84100 ASSAY OF PHOSPHORUS: CPT | Performed by: STUDENT IN AN ORGANIZED HEALTH CARE EDUCATION/TRAINING PROGRAM

## 2017-08-28 PROCEDURE — 25000132 ZZH RX MED GY IP 250 OP 250 PS 637: Performed by: PHYSICIAN ASSISTANT

## 2017-08-28 PROCEDURE — 36592 COLLECT BLOOD FROM PICC: CPT | Performed by: STUDENT IN AN ORGANIZED HEALTH CARE EDUCATION/TRAINING PROGRAM

## 2017-08-28 PROCEDURE — 80076 HEPATIC FUNCTION PANEL: CPT | Performed by: STUDENT IN AN ORGANIZED HEALTH CARE EDUCATION/TRAINING PROGRAM

## 2017-08-28 PROCEDURE — 83735 ASSAY OF MAGNESIUM: CPT | Performed by: STUDENT IN AN ORGANIZED HEALTH CARE EDUCATION/TRAINING PROGRAM

## 2017-08-28 PROCEDURE — 25000132 ZZH RX MED GY IP 250 OP 250 PS 637: Performed by: STUDENT IN AN ORGANIZED HEALTH CARE EDUCATION/TRAINING PROGRAM

## 2017-08-28 PROCEDURE — 36415 COLL VENOUS BLD VENIPUNCTURE: CPT | Performed by: TRANSPLANT SURGERY

## 2017-08-28 PROCEDURE — 25000132 ZZH RX MED GY IP 250 OP 250 PS 637: Performed by: TRANSPLANT SURGERY

## 2017-08-28 PROCEDURE — 25000131 ZZH RX MED GY IP 250 OP 636 PS 637: Performed by: TRANSPLANT SURGERY

## 2017-08-28 PROCEDURE — 84132 ASSAY OF SERUM POTASSIUM: CPT | Performed by: TRANSPLANT SURGERY

## 2017-08-28 PROCEDURE — 25000128 H RX IP 250 OP 636: Performed by: PHYSICIAN ASSISTANT

## 2017-08-28 PROCEDURE — 25800025 ZZH RX 258: Performed by: SURGERY

## 2017-08-28 PROCEDURE — 27210913 ZZH NUTRITION PRODUCT INTERMEDIATE PACKET

## 2017-08-28 PROCEDURE — 40000802 ZZH SITE CHECK

## 2017-08-28 PROCEDURE — 25000132 ZZH RX MED GY IP 250 OP 250 PS 637: Performed by: COLON & RECTAL SURGERY

## 2017-08-28 PROCEDURE — 00000146 ZZHCL STATISTIC GLUCOSE BY METER IP

## 2017-08-28 PROCEDURE — 80048 BASIC METABOLIC PNL TOTAL CA: CPT | Performed by: STUDENT IN AN ORGANIZED HEALTH CARE EDUCATION/TRAINING PROGRAM

## 2017-08-28 PROCEDURE — 25000125 ZZHC RX 250: Performed by: STUDENT IN AN ORGANIZED HEALTH CARE EDUCATION/TRAINING PROGRAM

## 2017-08-28 PROCEDURE — 25000131 ZZH RX MED GY IP 250 OP 636 PS 637: Performed by: PHYSICIAN ASSISTANT

## 2017-08-28 PROCEDURE — 85014 HEMATOCRIT: CPT

## 2017-08-28 PROCEDURE — 85025 COMPLETE CBC W/AUTO DIFF WBC: CPT | Performed by: STUDENT IN AN ORGANIZED HEALTH CARE EDUCATION/TRAINING PROGRAM

## 2017-08-28 PROCEDURE — 80197 ASSAY OF TACROLIMUS: CPT | Performed by: STUDENT IN AN ORGANIZED HEALTH CARE EDUCATION/TRAINING PROGRAM

## 2017-08-28 PROCEDURE — 40000133 ZZH STATISTIC OT WARD VISIT

## 2017-08-28 RX ORDER — HYDROCHLOROTHIAZIDE 25 MG/1
25 TABLET ORAL DAILY
Status: DISCONTINUED | OUTPATIENT
Start: 2017-08-28 | End: 2017-08-31

## 2017-08-28 RX ADMIN — DOXYCYCLINE HYCLATE 100 MG: 100 CAPSULE ORAL at 19:33

## 2017-08-28 RX ADMIN — Medication 15 ML: at 10:35

## 2017-08-28 RX ADMIN — LOPERAMIDE HYDROCHLORIDE 4 MG: 2 CAPSULE ORAL at 19:33

## 2017-08-28 RX ADMIN — HYDROCHLOROTHIAZIDE 25 MG: 25 TABLET ORAL at 12:14

## 2017-08-28 RX ADMIN — LOPERAMIDE HYDROCHLORIDE 4 MG: 2 CAPSULE ORAL at 12:14

## 2017-08-28 RX ADMIN — AMOXICILLIN AND CLAVULANATE POTASSIUM 1 TABLET: 875; 125 TABLET, FILM COATED ORAL at 19:33

## 2017-08-28 RX ADMIN — PANTOPRAZOLE SODIUM 40 MG: 20 TABLET, DELAYED RELEASE ORAL at 09:04

## 2017-08-28 RX ADMIN — OXYCODONE HYDROCHLORIDE 5 MG: 5 TABLET ORAL at 10:28

## 2017-08-28 RX ADMIN — SODIUM BICARBONATE 650 MG TABLET 1300 MG: at 13:46

## 2017-08-28 RX ADMIN — Medication 25 ML: at 00:10

## 2017-08-28 RX ADMIN — DIPHENOXYLATE HYDROCHLORIDE AND ATROPINE SULFATE 1 TABLET: 2.5; .025 TABLET ORAL at 19:33

## 2017-08-28 RX ADMIN — FLUDROCORTISONE ACETATE 0.1 MG: 0.1 TABLET ORAL at 09:03

## 2017-08-28 RX ADMIN — DOXYCYCLINE HYCLATE 100 MG: 100 CAPSULE ORAL at 09:04

## 2017-08-28 RX ADMIN — ACETAMINOPHEN 650 MG: 325 TABLET ORAL at 12:59

## 2017-08-28 RX ADMIN — Medication 15 ML: at 19:34

## 2017-08-28 RX ADMIN — AMOXICILLIN AND CLAVULANATE POTASSIUM 1 TABLET: 875; 125 TABLET, FILM COATED ORAL at 09:04

## 2017-08-28 RX ADMIN — Medication 250 MG: at 19:32

## 2017-08-28 RX ADMIN — DIPHENOXYLATE HYDROCHLORIDE AND ATROPINE SULFATE 1 TABLET: 2.5; .025 TABLET ORAL at 10:28

## 2017-08-28 RX ADMIN — AMLODIPINE BESYLATE 10 MG: 10 TABLET ORAL at 09:03

## 2017-08-28 RX ADMIN — LOPERAMIDE HYDROCHLORIDE 4 MG: 2 CAPSULE ORAL at 16:21

## 2017-08-28 RX ADMIN — TACROLIMUS 1 MG: 1 CAPSULE ORAL at 09:03

## 2017-08-28 RX ADMIN — HEPARIN SODIUM 5000 UNITS: 5000 INJECTION, SOLUTION INTRAVENOUS; SUBCUTANEOUS at 13:46

## 2017-08-28 RX ADMIN — SODIUM BICARBONATE 650 MG TABLET 1300 MG: at 19:33

## 2017-08-28 RX ADMIN — INSULIN ASPART 4 UNITS: 100 INJECTION, SOLUTION INTRAVENOUS; SUBCUTANEOUS at 17:05

## 2017-08-28 RX ADMIN — SODIUM BICARBONATE 650 MG TABLET 1300 MG: at 09:04

## 2017-08-28 RX ADMIN — HEPARIN SODIUM 5000 UNITS: 5000 INJECTION, SOLUTION INTRAVENOUS; SUBCUTANEOUS at 06:01

## 2017-08-28 RX ADMIN — TACROLIMUS 1 MG: 1 CAPSULE ORAL at 18:17

## 2017-08-28 RX ADMIN — OXYCODONE HYDROCHLORIDE 5 MG: 5 TABLET ORAL at 21:52

## 2017-08-28 RX ADMIN — HEPARIN SODIUM 5000 UNITS: 5000 INJECTION, SOLUTION INTRAVENOUS; SUBCUTANEOUS at 21:48

## 2017-08-28 RX ADMIN — LOPERAMIDE HYDROCHLORIDE 4 MG: 2 CAPSULE ORAL at 09:06

## 2017-08-28 RX ADMIN — Medication 2 G: at 13:00

## 2017-08-28 RX ADMIN — Medication 50 ML: at 00:50

## 2017-08-28 RX ADMIN — Medication 250 MG: at 09:04

## 2017-08-28 NOTE — PROGRESS NOTES
[facility]    Transplant Infectious Diseases Inpatient Progress note      Camacho Bhagat MRN# [id number]   YOB: 1965 Age: 51 year old   Date of Admission: 7/4/2017  4:45 AM  Transplant: 3/4/2017 (Liver), Postoperative day: 177            Recommendations:   1. Continue to check CMV PCR weekly till the 6 month post OLT yokasta (on 9/4/2017) then may go to every other week or even monthly for couple of more months.   - next CMV PCR on 8/31/2017.   2. Continue augmentin 875 mg bid till 9/6/2017 (total of 4 weeks of zosyn then augmentin).   3. Continue doxycycline 100 mg bid till 8/29/2017 (total of 10 days).   4. Continue to monitor EBV monthly.    5. Follow up with ID in 1-2 months.   6. Next pentamidine on 9/17/2017.  - if no longer with leukopenia may resume bactrim.     ID will sign off, please call for questions.         Summary of Presentation:   Transplants:  3/4/2017 (Liver), Postoperative day:  177     This patient is a 51 year old male with CASTAÑEDA s/p OLT in 3/2017. Basilixima for induction.   MMF was held due to neutropenia and multiple infections upon admission. Currently on TAC.      Presented with colitis, s/p exploratory laparotomy. Attributed to neutropenic colitis.   Then started to develop CMV viremia (primary infection)  Course complicated with colon perforation, intra-abdominal abscess, with or without CMV colitis.          Active Problems and Infectious Diseases Issues:   1. CMV viremia (donor derived).   With or without colitis.   The resected colon on 7/26/2017 showed perforation but only 3 cells which were positive for CMV staining. This is less likely to be CMV colitis but is being treated as such with induction dose of IV GCV from 7/20/2017 till 8/11/2017 (3 weeks) then switched to maintenance dose on 8/12/2017 till 8/26/2017, now we are monitoring CMV PCR weekly till the 6-month yokasta of OLT (9/4/2017) then either every other week or monthly for then next  few months. Please call ID if CMV PCR is positive.     2. Leukopenia/neutropenia.   Likely drug induced (GCV/VGCV).   Neutropenia resolved with G-CSF. Has been getting G-CSF as needed last dose 8/25/2017.   Hopefully stopping VGCV on 8/26/2017 will help with leukopenia/neutropenia.   Bactrim DCed again 8/17/2017 and the patient was given pentamidine instead.   The patient also has EBV viremia and PTLD is always possible but that should be entertained if WBC/ANC do not improve after the discontinuation of VGCV and/or if EBV viral load is rising.     3. Colon perforation   4. Intraabdominal abscess  Zosyn from 8/9/2017 till 8/22/2017, then augmentin 8/22/2017 till present.   Whether the patient truly has CMV colitis is not clear but is being treated as such.   On 8/24/2017 repeat CT done to follow up on retroperitoneal abscess detected increasing size of the abscess and new fluid collection in the peritoneum, the preexisting drain in the retroperitoneal collection was replaced due to occlusion and new drain placed in peritoneal fluid collection. Gram stain and cx repeated and so far negative.   It is not clear whether the patient still has microperforation or whether that increase in the size of fluid collection was only due to occluded drain.   Will continue total of 4 weeks of ABx (till 9/6/2017).     5. Erythema of the right index.   I think this is part of the ischemic process involving the right index, whether it's going to be progressed into dry ischemia or wet ischemia is not clear. The patient was started on linezolid on 8/19/2017 empirically the switched to doxycycline on 8/22/2017 for total therapy course of 10 days (till 8/29/2017).   It is overall improving.     6. EBV primary infection (donor derived)  Subsiding.   May check monthly.     Other Infectious Disease issues include  - PCP prophylaxis: bactrim DCed in June of 2017 due to neutropenia. Resumed briefly to be DCed again on 8/17/2017 due to  leukopenia. The patient received pentamidine on 8/17/2017.   - Serostatus: CMV D+/R-, EBV D+/R-, HSV1?/2?, VZV ?    Now he's on GCV IV.   - Immunization status: up-to-date  - Gamma globulin status: >1660 as of 7/24/2017.       Attestation:  I interviewed the patient and obtained history from the patient, and by reviewing the patient's chart including outside records, microbiological data, and radiological data. All data are summarized in this notes.  Naseem FERGUSONDuy Figueroa   Pager: [phone]  08/28/2017        Interim History:  Event of replacing the retroperitoneal drain on 8/24/2017 due to occlusion and placing new drain in new peritoneal fluid collection noted. No new complaints. No major events over the weekend.      HPI:  In summary:  CASTAÑEDA s/p OLT in 3/2017.   Presented with abdominal pain and underwent exploratory laparotomy which was not c/w with ischemia. It was felt to be related to neutropenia.   The patient has neutropenia since June of 2017 attributed to immunosuppressive therapy/bactrim/VGCV. Due to neutropenia, bactrim DCed June of 2017 and VGCV was DCed at unknown tome.     The patient was started on broad spectrum ABx with persistent fever.   Bronchoscopy done 7/12/2017 grew single colony of VRE which was considered a colonizer.   Ascitic fluid checked on 7/5/2017 and grew S capitis considered a contaminant.   Repeat BAL on 7/15/2017 and repeat paracentesis on 7/7/14/2017 were unremarkable for growth but the ascitic fluid was exudative.     The course was complicated by thrombosis of the right radial artery with ischemia to to the tip of the right index finger.   He also has ischemia to the right hallux and second toe.     The patient finished a course of ertapenem for colitis/fever/sepsis, his mental status started to improve.   CMV PCR became detected at low level and workup for possible CMV colitis was initiated. Colonoscopy done 7/21/2017 was with poor prep and random biopsies were negative for  colitis including CMV colitis. GCV was initiated on 7/20/2017 (repeat CMV PCR was 3.3 log on 7/20/2017, a 0.3 log increase from CMV PCR done on 7/18/2017).     On 7/25/2017 he suffered from acute respiratory distress and became obtunded with distended abdomen. He was found to have new free air and intra-abdominal fluid collection.    On 7/26/2017 he underwent exp lap with right hemicolectomy, end ileostomy, and mucus fistula. The pathology showed perforation with only 3 cells positive for CMV staining.   GCV IV was continued with broad spectrum ABx including meropenem/daptomycin/micafungin till around 8/3/2017.    IV GCV was switched to maintenance dose on 8/12/2017 after 2 values of CMV VL <137 IU/mL.     The patient continued to have intermittent fevers and altered mentation with ID concerned about persistent fistula. On 8/9/2017 the patient underwent CT guided drainage of retroperitoneal fluid collection which was a very difficult procedure. The fluid was described as serosanguinous but grew Cl septicum. The patient was started on linezolid/zosyn on 8/9/2017. On 8/14/2017 another CT showed persistent free air and and peritonitis. A new drain was placed on 8/15/2017 that showed inflammatory process with WBC of >15,000 but with negative cx including anaerobic cx.   Worthwhile to mention that in between the patient also underwent paracentesis on 8/11/2017 with WBC of 5000 and positive cx for Cl septicum and negative fungal cx.     The patient was continued on zosyn, linezolid was DCed on 8/15/2017 to be resumed again on 8/19/2017 for cellulitis of the right finger which sustained (among other fingers) ischemic injury during the hospital course.     The patient did have N/V yesterday but today he has no N/V and no other new complaints. No major events to report. No abdominal pain.       ROS:  As mentioned in the interim history otherwise negative by reviewing central and peripheral neurological systems, respiratory  system, cardiac system, GI system,  system, musculoskeletal, skin, allergy, and lymphatics.                  Pysical Examination:  Temp: 98.2  F (36.8  C) Temp src: Oral BP: 144/79 Pulse: 85 Heart Rate: 85 Resp: 16 SpO2: 93 % O2 Device: None (Room air)    Vitals:    08/22/17 1100 08/23/17 0900 08/25/17 0856 08/27/17 1100   Weight: 102.1 kg (225 lb 1.6 oz) 99.9 kg (220 lb 4.8 oz) 98.9 kg (218 lb) 98.7 kg (217 lb 8 oz)    08/27/17 1128   Weight: 100.5 kg (221 lb 8 oz)     Constitutional: lying in bed in no apparent distress, answers questions appropriately and follows commands.    Abdomen: distended, soft but tender in the RUQ, no masses, no bruit, normal BS, midline wound is intact with no dehiscence or erythema and no drainage, RLQ ileostomy and proximal midline wound with mucus fistula and LLQ drain.        Medications:  Medications that Require Transfusion:     dextrose 50 mL/hr at 08/28/17 0847     dextrose 50 mL/hr at 08/24/17 1607     IV fluid REPLACEMENT ONLY 50 mL/hr at 08/28/17 0111     NaCl Stopped (08/22/17 0900)   Scheduled Medications:     amoxicillin-clavulanate  1 tablet Oral Q12H FRANCO     loperamide  4 mg Oral 4x Daily     amLODIPine  10 mg Oral Daily     diphenoxylate-atropine  1 tablet Oral BID     doxycycline  100 mg Oral BID     pantoprazole  40 mg Oral Daily     tacrolimus  1 mg Oral BID IS     menthol   Transdermal Q8H     insulin aspart  1-12 Units Subcutaneous 5x Daily     protein modular  1 packet Per Feeding Tube Daily     insulin aspart   Subcutaneous TID w/meals     artificial saliva  15 mL Swish & Spit 4x Daily     heparin  5,000 Units Subcutaneous Q8H     fludrocortisone  0.1 mg Oral Daily     sodium chloride (PF)  10 mL Irrigation Q8H     fiber modular  1 packet Per Feeding Tube BID     sodium chloride (PF)  20 mL Irrigation Q6H     saccharomyces boulardii  250 mg Oral BID     sodium bicarbonate  1,300 mg Per NG tube TID     miconazole with skin protectant   Topical BID     sodium  chloride (PF)  3 mL Intracatheter Q8H       Laboratory Data:   No results found for: ACD4    Inflammatory Markers    Recent Labs   Lab Test  08/16/17   0650  08/09/17   0711  08/07/17   0549  08/06/17   0633  08/05/17   0616  07/05/17   1810  03/05/17   0358  11/23/16   0813   08/15/11   1151  04/11/11   1209  01/03/11   1246  02/15/10   1232   SED   --    --    --    --    --    --    --   45*   --   11  14  17*  25*   CRP  140.0*  190.0*  130.0*  130.0*  140.0*  354.0  20.0*  58.0*   < >  11.1*  9.0*  11.7*  17.5*    < > = values in this interval not displayed.       Immune Globulin Studies     Recent Labs   Lab Test  07/24/17   0705  08/15/11   1243   IGG  1660*  1160   IGG1   --   734   IGG2   --   294   IGG3   --   7*   IGG4   --   59       Metabolic Studies       Recent Labs   Lab Test  08/28/17   0618  08/28/17   0110  08/27/17   1155  08/27/17   0630  08/26/17   0624  08/25/17   1312  08/25/17   0540  08/24/17   0636  08/23/17   0640   08/01/17   0651   07/25/17   0945   07/06/17   0316   NA  140   --    --   141  140   --   143  139  139   < >  150*   < >  137   < >  143   POTASSIUM  5.5*  5.0  5.9*  5.9*  5.7*   --   5.5*  5.5*  5.2   < >  4.3   < >  4.0   < >  5.0   CHLORIDE  108   --    --   110*  109   --   114*  110*  110*   < >  123*   < >  104   < >  116*   CO2  25   --    --   24  24   --   25  24  25   < >  20   < >  26   < >  18*   ANIONGAP  6   --    --   6  6   --   4  4  4   < >  7   < >  7   < >  9   BUN  29   --    --   31*  34*   --   38*  39*  45*   < >  51*   < >  77*   < >  62*   CR  0.97   --    --   0.96  0.92   --   0.91  0.90  0.92   < >  0.90   < >  2.60*   < >  2.75*   GFRESTIMATED  81   --    --   82  86   --   87  88  87   < >  89   < >  26*   < >  24*   GLC  92   --    --   129*  163*   --   111*  135*  139*   < >  141*   < >  382*   < >  139*   A1C   --    --    --    --    --    --    --    --    --    --    --    --    --    --   Canceled, Test credited   Below Assay  Range  NOTIFIED LEONARD ONEILL AT 0538 ON 7/6/17 BY EAANTOLIN     JACOB  8.1*   --    --   8.2*  8.1*   --   8.4*  8.2*  8.1*   < >  8.1*   < >  7.6*   < >  6.9*   PHOS  4.2   --    --   5.6*  3.9   --   4.3  4.1  3.2   < >  3.1   < >   --    < >  5.5*   MAG  1.7   --    --   2.0  2.1   --   1.8  1.9  1.7   < >  1.9   < >   --    < >  2.1   LACT   --    --   0.6*   --    --   1.1   --    --    --    < >   --    < >   --    < >  1.5   CKT   --    --    --    --    --    --    --    --    --    --   16*   --   40   --    --     < > = values in this interval not displayed.       Hepatic Studies    Recent Labs   Lab Test  08/28/17   0618  08/24/17   0636  08/21/17   0649  08/20/17   1640  08/18/17   0538  08/17/17   0632  08/14/17   0621  08/10/17   1323   07/25/17   0017   07/11/17   0328   BILITOTAL  0.6  0.4  0.4   --   0.5  0.6  0.7   --    < >   --    < >  0.5   ALKPHOS  251*  243*  245*   --   236*  254*  271*   --    < >   --    < >  158*   ALBUMIN  1.8*  1.7*  1.6*  1.6*  1.7*  1.7*  1.8*   --    < >   --    < >  1.8*   AST  44  34  39   --   32  33  24   --    < >   --    < >  34   ALT  19  21  24   --   21  21  17   --    < >   --    < >  27   LDH   --    --    --    --    --    --    --   168   --   258*   --    --    GGT   --    --    --    --    --    --    --    --    --    --    --   58    < > = values in this interval not displayed.       Pancreatitis testing    Recent Labs   Lab Test  08/16/17   0650  07/24/17   0705  07/17/17   0630  07/12/17   0342  07/10/17   0340  07/05/17   0346  03/05/17   0358  03/03/17   1458  02/21/17   1520  12/09/16   1159  02/08/16   1144   09/30/10   1734   AMYLASE   --    --    --    --    --    --   53  70   --    --    --    --   82   LIPASE  55*   --    --    --    --    --   216   --   326  161   --    --   138   TRIG   --   303*  168*  114  177*  174*   --    --    --    --   99   < >   --     < > = values in this interval not displayed.       Hematology Studies      Recent  Labs   Lab Test  08/28/17   0618  08/27/17   0630  08/26/17   2341  08/26/17   1708  08/26/17   0624  08/25/17   0540  08/24/17   0636  08/23/17   0640   08/22/17   0640   WBC  2.7*  2.6*   --   2.9*  3.0*  2.4*  2.0*  1.8*   --   1.3*   ANEU  1.4*  1.3*   --    --   1.7  1.0*  0.9*  0.7*   --   0.6*   ALYM  0.6*  0.4*   --    --   0.8  0.9  0.7*  0.7*   --   0.6*   ZINA  0.5  0.7   --    --   0.6  0.5  0.3  0.3   --   0.1   AEOS  0.0  0.0   --    --   0.0   --   0.0  0.0   --   0.0   HGB  8.0*  8.6*  8.2*  8.1*  6.9*  7.3*  7.3*  7.1*   < >  7.2*   HCT  24.9*  27.3*  25.5*  24.9*  21.8*  22.6*  23.0*  22.2*   --   23.0*   PLT  82*  90*   --   88*  95*  101*  108*  102*   --   96*    < > = values in this interval not displayed.       Arterial Blood Gas Testing    Recent Labs   Lab Test  08/10/17   1515  08/02/17   1216  07/26/17   2243  07/26/17   2133   07/26/17   2017  07/25/17   1746   PH  7.33*  7.37  Incorrect specimen type  NOTTIED BY WOJCIECH DEWITT, NEW ORDER TO REPLACE.7/26/17 AT 2346 BY ZAINAB.  CORRECTED ON 07/26 AT 2350: PREVIOUSLY REPORTED AS 7.27     --    --   Canceled, Test credited   Incorrect specimen type    7.32*   PCO2  34*  31*  Incorrect specimen type  NOTTIED BY WOJCIECH DEWITT, NEW ORDER TO REPLACE.7/26/17 AT 2346 BY ZAINAB.  CORRECTED ON 07/26 AT 2350: PREVIOUSLY REPORTED AS 45     --    --   Canceled, Test credited   Incorrect specimen type    42   PO2  68*  69*  Incorrect specimen type  NOTTIED BY WOJCIECH DEWITT, NEW ORDER TO REPLACE.7/26/17 AT 2346 BY ZAINAB.  CORRECTED ON 07/26 AT 2350: PREVIOUSLY REPORTED AS 53     --    --   Canceled, Test credited   Incorrect specimen type    81   HCO3  18*  18*  Incorrect specimen type  NOTTIED BY WOJCIECH DEWITT, NEW ORDER TO REPLACE.7/26/17 AT 2346 BY ZAINAB.  CORRECTED ON 07/26 AT 2350: PREVIOUSLY REPORTED AS 20     --    --   Canceled, Test credited   Incorrect specimen type    22   O2PER  2L  21  Incorrect specimen type  NOTTIED BY WOJCIECH DEWITT, NEW ORDER TO  REPLACE.7/26/17 AT 2346 BY ZAINAB.  CORRECTED ON 07/26 AT 2350: PREVIOUSLY REPORTED AS 40    40  62   < >  100.0  100  40.0    < > = values in this interval not displayed.        Urine Studies     Recent Labs   Lab Test  08/10/17   1752  08/08/17   1600  08/05/17   0617  07/28/17   1042  07/25/17   1205   URINEPH  5.0  5.0  5.0  5.0  5.0   NITRITE  Negative  Negative  Negative  Negative  Negative   LEUKEST  Negative  Negative  Negative  Negative  Trace*   WBCU  10*  7*  5*  6*  5*       Vancomycin Levels     Recent Labs   Lab Test  07/06/17   0316  10/28/16   1405   VANCOMYCIN  22.1  14.4       Tacrolimus levels    Invalid input(s): TACROLIMUS, TAC, TACR  Transplant Immunosuppression Labs Latest Ref Rng & Units 8/28/2017 8/27/2017 8/26/2017 8/26/2017 8/25/2017   Tacro Level 5.0 - 15.0 ug/L - 15.6(H) - 7.3 13.3   Tacro Level - - Not Provided - Not Provided Not Provided   Creat 0.66 - 1.25 mg/dL 0.97 0.96 - 0.92 0.91   BUN 7 - 30 mg/dL 29 31(H) - 34(H) 38(H)   WBC 4.0 - 11.0 10e9/L 2.7(L) 2.6(L) 2.9(L) 3.0(L) 2.4(L)   Neutrophil % 53.0 51.4 - 55.6 40.0   ANEU 1.6 - 8.3 10e9/L 1.4(L) 1.3(L) - 1.7 1.0(L)       CSF testing     No lab results found.      Microbiology:  As reviewed in the HPI paragraph.     Last check of C difficile  C Diff Toxin B PCR   Date Value Ref Range Status   08/13/2017  NEG Final    Negative  Negative: Clostridium difficile target DNA sequences NOT detected, presumed   negative for Clostridium difficile toxin B or the number of bacteria present   may be below the limit of detection for the test.   FDA approved assay performed using [employer] GeneXpert real-time PCR.   A negative result does not exclude actual disease due to Clostridium difficile   and may be due to improper collection, handling and storage of the specimen or   the number of organisms in the specimen is below the detection limit of the   assay.         Virology:  CMV viral loads    Recent Labs   Lab Test  08/24/17   0636  08/17/17    0632  08/10/17   0704  08/03/17   0533  07/27/17   1109   CSPEC  Plasma  Plasma, EDTA anticoagulant  EDTA PLASMA  CORRECTED ON 08/11 AT 0714: PREVIOUSLY REPORTED AS Whole Blood    EDTA PLASMA  EDTA PLASMA  CORRECTED ON 07/28 AT 0840: PREVIOUSLY REPORTED AS Blood     CMVLOG  Not Calculated  Not Calculated  <2.1  <2.1  2.5*       CMV viral loads    Log IU/mL of CMVQNT   Date Value Ref Range Status   08/24/2017 Not Calculated <2.1 [Log_IU]/mL Final   08/17/2017 Not Calculated <2.1 [Log_IU]/mL Final   08/10/2017 <2.1 <2.1 [Log_IU]/mL Final   08/03/2017 <2.1 <2.1 [Log_IU]/mL Final   07/27/2017 2.5 (H) <2.1 [Log_IU]/mL Final   07/20/2017 3.3 (H) <2.1 [Log_IU]/mL Final   07/18/2017 3.0 (H) <2.1 [Log_IU]/mL Final   07/15/2017 3.6 (H) <2.1 [Log_IU]/mL Final   07/10/2017 2.2 (H) <2.1 [Log_IU]/mL Final   07/03/2017 <2.1 <2.1 [Log_IU]/mL Final   06/26/2017 Not Calculated <2.1 [Log_IU]/mL Final       CMV resistance testing  Recent Labs   Lab Test  07/27/17   1109   CMVCID  Canceled, Test credited   Unsatisfactory specimen   Quantity not sufficient   Notification of test cancellation was given to  Naseem Kepm.7/28/17 at 1408.TV.     CMVFOS  Canceled, Test credited   Unsatisfactory specimen   Quantity not sufficient   Notification of test cancellation was given to  Naseem Kemp.7/28/17 at 1408.TV.     CMVGAN  Canceled, Test credited   Unsatisfactory specimen   Quantity not sufficient   Notification of test cancellation was given to  Naseem Kemp.7/28/17 at 1408.TV.       CMV Cidofovir Resist   Date Value Ref Range Status   07/27/2017   Final    Canceled, Test credited   Unsatisfactory specimen   Quantity not sufficient   Notification of test cancellation was given to  Naseem Kemp.7/28/17 at 1408.TV.       CMV Foscarnet Resist   Date Value Ref Range Status   07/27/2017   Final    Canceled, Test credited   Unsatisfactory specimen   Quantity not sufficient   Notification of test cancellation was given  to  Naseem Kemp.7/28/17 at 1408.TV.       CMV Ganciclovir Resis   Date Value Ref Range Status   07/27/2017   Final    Canceled, Test credited   Unsatisfactory specimen   Quantity not sufficient   Notification of test cancellation was given to  Naseem Kemp.7/28/17 at 1408.TV.         USCNS Invalid input(s): USCN, USCNS    USCNS No components found for: USCN, USCNS      No results found for: H6RES    EBV DNA Copies/mL   Date Value Ref Range Status   08/15/2017 753 (A) EBVNEG^EBV DNA Not Detected [Copies]/mL Final   08/01/2017 6864 (A) EBVNEG [Copies]/mL Final   07/18/2017 609814 (A) EBVNEG [Copies]/mL Final         Pathology  Colon pathology after resection 7/26/2017   FINAL DIAGNOSIS:   A. COLON, TERMINAL ILEUM, APPENDIX, RIGHT COLON, TRANSVERSE COLON, RIGHT   ART-COLECTOMY:   - Colonic wall with perforation, edema, and acute serositis   - Background colonic mucosa with no significant histologic abnormality   - CMV immunostain is positive in rare (3) cells   - Terminal ileum with no significant histologic abnormality   - Viable, unremarkable surgical margins   - One benign lymph node (0/1)   - Appendix with fibrous obliteration of the tip     B. OMENTUM, PARTIAL OMENTECTOMY:   - Fibroadipose tissue with acute and chronic inflammation, fat necrosis   Colon biopsies 7/21/2017   A: Colon biopsy, ascending   B: Colon biopsy, descending   FINAL DIAGNOSIS:   A. COLON BIOPSY, ASCENDING:   - Colonic mucosa with no significant histologic abnormality   - Negative for intraepithelial lymphocytosis or deposition of   subepithelial thick collagenous band   B. COLON BIOPSY, DESCENDING:   - Crypt architecture distortion, consistent with healed prior injury   - Negative for active inflammation or dysplasia   - Negative for intraepithelial lymphocytosis or deposition of   subepithelial thick collagenous band     Cytology of ascitic fluid 7/25/2017   PERITONEAL FLUID, ASCITES:   -Negative for malignancy   Cytology of ascitic  fluid 7/14/2017.   Peritoneal fluid, ascites:   - Negative for malignancy   - Acute and chronic inflammation present   Specimen Adequacy: Satisfactory for evaluation.   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   INTERPRETATION:   Ascites fluid:        Rare to absent viable B cells     COMMENT:   This specimen was collected on 7/14/17 but was not ordered/delivered to   lab/run until 7/18/17 - results should be interpreted with caution due   to low cellularity/viability.     Due to limited cellularity we were only able to analyze the B cells.   There is no immunophenotypic evidence of B cell non-Hodgkin lymphoma.   Neoplastic cells, including large cells, may not survive specimen   processing. This sample may not be representative. Final interpretation   requires correlation with morphologic and clinical features.       Imaging:  Right finger XR 8/19/2017 reviewed by myself   IMPRESSION: No erosive changes in the right index finger to suggest  osteomyelitis.     CXR 8/25/2017.   IMPRESSION:   1. Enteric tube and PICC in stable positions.  2. Pulmonary edema.  3. Denser left retrocardiac opacities suggest the possibility of  superimposed infection    CT a/p 8/23/2017   IMPRESSION:   1. Overall increased size of intraperitoneal and retroperitoneal fluid  collections since 8/15/2017, as described above. In particular, the 2  collections which contain drainage catheters appear mildly increased  in size since 8/15/2017. Other smaller collections are stable to  slightly decreased in size.  2. Worsening small bowel dilation, especially of the jejunum in the  left upper quadrant, favored to represent adynamic ileus although  developing small bowel obstruction cannot be excluded.  3. Moderate volume ascites and extensive mesenteric and periportal  congestion is increased since 8/15/2017.  4. Unchanged splenomegaly and portal venous collaterals throughout the  upper abdomen.  5. Slight increase in volume of small bilateral  pleural effusions and  associated bibasilar atelectasis.   6. Anasarca.  CT a/p 8/15/2017  IMPRESSION:   1. Contrast opacification of the mucous fistula which is contiguous  with the transverse and descending colon. No leak identified. Distal  sigmoid colon and rectum were not well opacified. Unchanged wall  thickening involving the descending colon and rectum.  2. Interval placement of anterior abdominal drainage catheter and  stable position of right retroperitoneal drainage catheter. Multiple  intra-abdominal fluid collections containing foci of gas, the largest  of which has decreased in size secondary to catheter placement.   3. Stable mild bilateral hydronephrosis. Bladder wall thickening may  be reactive or due to a urinary tract infection.  4. Stable bibasilar atelectasis and small pleural effusions right  greater than left.  5. Splenomegaly.  CT a/p 8/14/2017  IMPRESSION:   1a. Moderate volume ascites, stable with increased foci of  intraperitoneal gas, potentially relating to catheter flushes,  redistribution of retroperitoneal gas or infection though given recent  surgical procedures on the presence of two ostomies, perforation or  dehiscence is also a consideration.  1b. Thickened peritoneum concerning for peritonitis.  2. Complex right retroperitoneal gas containing collection is stable  to slightly decreased in size compared with 8/9/2017. Pigtail catheter  is in good position.   3. Stable loculated bibasilar pleural effusions and bibasilar  consolidative opacities which could represent infection or  atelectasis.  4. Postsurgical changes of right hemicolectomy. Several loops of  borderline dilated small bowel; however, oral contrast reaches the  ileostomy.  5. Stable left hydronephrosis.    CT c/a/p 7/25/2017 reviewed by myself.   IMPRESSION:   1. New large heterogeneous area of right-sided retroperitoneal gas  which is highly concerning for an infectious process. Given the  history of prior  neutropenic colitis and biopsies, there could also be  a component of stool from a ruptured viscus, despite the lack of  surrounding bowel loops and no extraluminal oral contrast. Surgical  consultation is recommended.   2. Bibasilar atelectasis versus consolidation with small bilateral  pleural effusions.  3. Extensive mesenteric and soft tissue edema with large volume  ascites. Numerous mesenteric and retroperitoneal lymph nodes which are  presumably reactive.  4. Postsurgical changes of liver transplant.  CT c/a/p7/13/2017 Reviewed by myself.   IMPRESSION:   1. Improved moderate right-sided pleural effusion and resolution of  left-sided pleural effusion. There remain large areas of  atelectasis/consolidation in both lungs, including in the left lower  lobe despite resolution of left-sided pleural effusion. Superimposed  infectious process cannot be excluded.  2. Moderate-large amount of ascites increased from 7/8/2017, which  makes it difficult to evaluate for the presence or absence of  inflammatory change or infectious process in the abdomen or pelvis. No  intra-abdominal abscess.  3. Stable small presumed hematoma within the ventral abdominal wall  fat.  4. Splenomegaly.    MRI brain 7/20/2017.   Impression:  1. Significant image degradation due to motion artifact, with no  definite acute intracranial pathology. No abnormal contrast enhancing  intracranial lesions.  2. Mucosal thickening in the sphenoid and maxillary sinuses and left  greater than right mastoid fluid are nonspecific in the setting of  recent intubation.    Naseem Figueroa  Pager: [phone]

## 2017-08-28 NOTE — PLAN OF CARE
Problem: Goal Outcome Summary  Goal: Goal Outcome Summary  Outcome: Improving  VSS, no fevers. Coughs occasionally. Lungs decreased in bases, but doing well on room air. Drains patent and draining x 3. Ileostomy with orange-red-brown color- suspicious for some blood. Pain in back- responded well to oxycodone and Tylenol. Hgb stable. Now on a low K= diet with calorie counts. Very anxious to see if he can take in enough calories, but limited as to the number of supplements he can drink because of elevated potassium. In good spirits; wife here to take him outside.

## 2017-08-28 NOTE — PROGRESS NOTES
Immunosuppression Note:    Camacho Bhagat is a 51 year old male who is seen today  for immunosuppression management     I, Jovan Tran MD, I have examined the patient with the resident/PA/Fellow, discussed and agree with the note and findings.  I have reviewed today's vital signs, medications, labs and imaging. I reviewed the immunosuppression medications and levels. I spoke to the patient/family and explained below clinical details and answered all the questions      Transplant Surgery  Inpatient Daily Progress Note  08/28/2017    Assessment & Plan: Camacho Bhagat is a 52 year old male with history of CASTAÑEDA cirrhosis s/p liver transplant on 3/4/17. Admitted 7/4/17 from [facility] with sepsis secondary to colitis, taken to OR for initial ex-lap with findings of typhlitis in the right colon, wound left open with wound vac in place for re exploration and interval closure on 7/5/17. Concern for CMV colitis with low count CMV viremia, underwent colonoscopy on 7/21/17, biopsies obtained.  On 7/25/17 patient became septic with abdominal compartment syndrome.  CT noted new large heterogeneous right sided retroperitoneal gas and air tracking along duodenum, and patient underwent an ex-lap, right angelique-colectomy, end ileostomy, mucus fistula, partial omentectomy on 7/26/17 by colorectal surgery.  Post-op course complicated by retroperitoneal abscess/peritonitis requiring percutaneous drainage.    Graft Function: Good function. LFTs acceptable (checking every M, Th).   Immunosuppression Management:   CellCept discontinued (6/27/17) due to neutropenia.   Prograf goal ~5-6 due to sepsis. Level 8/27 15.5; will reduce morning dose to 1mg (from 3 gm BID) and then adjust evening dose according to level today (pending)  Cardiorespiratory:   -Suspected RONNIE:  Uses O2 overnight only for brief apnea while sleeping per nursing.  -HTN: SBP 140s-160s. Amlodipine increased to 10 mg daily.  Pt on daily Florinef daily for hyperkalemia.  K  5.5 this AM, may need to increase florinef.  Will review meds and discuss with nephrology.  -R/o HCAP:  New productive cough on 8/24, no fever.  CXR: retrocardiac opacity.  Already on Augmentin.  MRSA swab negative.  Add duonebs and mycomust x2 days.  Out of bed.  Aggressive pulmonary toilet.  Hematology: Pancytopenia present on admission, persists.  Contributing factors include medications and CMV infection.  -Anemia- Hemoglobin 8.0 (8.6). Dropped below 7 8/26 with bloody ileostomy output, scope completed, see below. ASA held. Last blood transfusion on 8/26 (2 units).   -Leukopenia/neutropenia- WBC 2.7, ANC 1.4.  Received GCSF 8/20, 8/22-25.  -Thrombocytopenia-  Plt 82 today, previously dropped with linezolid.  GI:   -Neutropenic colitis on admission. Colonoscopy 7/21. Biopsy: resolving injury secondary to possible drug induced vs infectious.    -Bowel perforation: 7/25 CT scan abd/pelvis-new large heterogeneous right sided retroperitoneal gas and air tracking along duodenum.  No contrast extravasation.  No intraperitoneal air noted. Colorectal surgery performed Ex lap, right angelique-colectomy, end ileostomy, mucus fistula, partial omentectomy on 7/26. Findings no neida perforation, phlegmon/inflammation right colon. Colon pathology suggested colon perforation, negative for malignancy; CMV stain positive.    -Retroperitoneal abscess/ peritonitis: See ID section below.  -Diet:  NJ tube was dislodged overnight, will hold off on replacement for a few days per Dr. Tran to monitor patient's intake.  NPO currently for possible capsule endoscopy.  Resume low K diet with aspiration precautions while eating.  Nepro NJ feeds, cycled.  Magen counts.  -High output  ileostomy:  Goal ileostomy output ~ 1200 cc in 24 hrs. Ileostomy output sanguinous liquid with clots 8/26. Appreciate GI recs.  Ileoscopy and EGD 8/27 showed no active bleeding or ulceration, may consider capsule endoscopy if significant bleeding from ileostomy.   Will ask if GI wants to pursue capsule endoscopy today. Continue loperamide 4mg QID, Lomotil BID, and fiber BID.   Fluid/Electrolytes/Renal:   -EJ: on admission, d/t ATN sec to sepsis. Now resolved, Cr < 1.  -Hypernatremia: resolved with free water.    -Hyperkalemia: Neph consulted earlier in hospitalization. 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  On daily bicarb, PRN lasix, florinef, low K diet. K 5.5 today, will reconsult nephrology.  Endocrine: DM type 2. Endocrine consulting for glycemic management.  Hypoglycemic while NPO.  Infectious disease: Afebrile.  WBC 2.7, ID signing off today, reference their note from 8/28 for final recs.  -Retroperitoneal abscess/peritonitis: CT C/A/P 8/9 showed a persistent gas/fluid collection RLQ, peritonitis, some free air but no evidence of contrast extravasation.  8/9 retroperitoneal drain placed, cx + clostridium septicum.  8/11 paracentesis: WBC 5,038, drain cultures + clostridium septicum (day 6, broth only).  8/14 CT scan abd/pelvis: Complex gas-containing fluid collection in the right retroperitoneum decreased slightly in size, moderate ascites with intraperitoneal gas, peritonitis noted, no bowel leak.  8/15 peritoneal drain placement: WBC 15,680, culture negative.  8/23 CT:  Fluid collections slightly larger.  Patient clinically worse with increased AMS.  8/24 IR replaced retroperitoneal drain, placed new 12F LLQ drain.  Patient improved clinically after drains placed.   Will send fluid culture from LLQ drain.  On zosyn (8/9-8/22), linezolid (8/9-8/15), switched to Augmentin (8/22-present).  -CMV viremia: Primary CMV infection.  (CMV R-D+) CMV colitis per pathology, peak serum 7/15 4225 IU/ml.   IV Ganciclovir (started 7/20).  CMV count fell to <137. Decreased Ganciclovir to 5mg/kg on 8/18.  Switched to valcyte 900mg daily 8/22, continue until 8/26.  Check CMV quant weekly until 9/24 and then every other week for 2 months. Last check 8/24 CMV not detected..  -EBV viremia:  Peak serum  406,697 copies/ml on 7/18.   753 copies/ml on 8/15.  Check quant monthly, due 9/15.  -Cellulitis/dry gangrene:  Right 1st finger, on linezolid (8/19- 8/22), switched to doxycycline x7 days (8/22-8/28).  Clinically resolved.  -R/o HCAP: As above.  Infectious disease resolved issues:  -Severe sepsis: On admission, secondary to right colon phlegmon. Meropenem/daptomycin/micafungin tx x 10 days completed on 8/3. S/p right angelique-colectomy.  Path: CMV stain +, perforation of colon noted.   Neuro: Toxic metabolic encephalopathy secondary to infection, prolonged hospital stay.  Improved today.  Vascular:  Microvascular ischemic injury in digits (toes, finger) this is most likely due to injury while patient was on pressor therapy with sepsis. Erythema right 1st finger documented and marked, now resolved.   Activity: Deconditioned due to prolong hospital course. Daily PT/OT, encourage pt to be OOB to chair at least TID.   Prophylaxis: DVT-Heparin SQ  Disposition: 7A.     Medical Decision Making: Medium    PILLO/Fellow/Resident Provider: Lori Willingham PA-C [id number]    Faculty: Jovan Tran MD     __________________________________________________________________  Interval History:   Overnight events:   Feeling well overall, excited to try to eat more.  Alert and oriented and very pleasant.      ROS:   A 10-point review of systems was negative except as noted above.    Meds:    amoxicillin-clavulanate  1 tablet Oral Q12H FRANCO     loperamide  4 mg Oral 4x Daily     amLODIPine  10 mg Oral Daily     diphenoxylate-atropine  1 tablet Oral BID     doxycycline  100 mg Oral BID     pantoprazole  40 mg Oral Daily     tacrolimus  1 mg Oral BID IS     menthol   Transdermal Q8H     insulin aspart  1-12 Units Subcutaneous 5x Daily     protein modular  1 packet Per Feeding Tube Daily     insulin aspart   Subcutaneous TID w/meals     artificial saliva  15 mL Swish & Spit 4x Daily     heparin  5,000 Units Subcutaneous Q8H     fludrocortisone   0.1 mg Oral Daily     sodium chloride (PF)  10 mL Irrigation Q8H     fiber modular  1 packet Per Feeding Tube BID     sodium chloride (PF)  20 mL Irrigation Q6H     saccharomyces boulardii  250 mg Oral BID     sodium bicarbonate  1,300 mg Per NG tube TID     miconazole with skin protectant   Topical BID     sodium chloride (PF)  3 mL Intracatheter Q8H       Physical Exam:     Admit Weight: 94.2 kg (207 lb 11.2 oz) (SCALE 2)    Current vitals:   /79 (BP Location: Left arm)  Pulse 85  Temp 98.2  F (36.8  C) (Oral)  Resp 16  Ht 1.829 m (6')  Wt 96.5 kg (212 lb 11.2 oz)  SpO2 93%  BMI 28.85 kg/m2    Vital sign ranges:    Temp:  [98  F (36.7  C)-98.5  F (36.9  C)] 98.2  F (36.8  C)  Pulse:  [83-86] 85  Heart Rate:  [85-92] 85  Resp:  [16-27] 16  BP: (144-167)/(79-90) 144/79  SpO2:  [90 %-99 %] 93 %  Patient Vitals for the past 24 hrs:   BP Temp Temp src Pulse Heart Rate Resp SpO2 Weight   08/28/17 0958 - - - - - - - 96.5 kg (212 lb 11.2 oz)   08/28/17 0727 144/79 98.2  F (36.8  C) Oral 85 85 16 93 % -   08/27/17 2358 146/84 98  F (36.7  C) Oral 83 - 16 93 % -   08/27/17 1951 157/86 98.5  F (36.9  C) Oral 86 86 18 93 % -   08/27/17 1531 167/88 98.1  F (36.7  C) Oral - 88 18 90 % -   08/27/17 1141 162/84 98.5  F (36.9  C) Axillary - 87 18 90 % -   08/27/17 1128 - - - - - - - 100.5 kg (221 lb 8 oz)   08/27/17 1100 - - - - - - 92 % 98.7 kg (217 lb 8 oz)   08/27/17 1054 - - - - - - 92 % -   08/27/17 1049 - - - - - - 94 % -   08/27/17 1042 - - - - - 23 92 % -   08/27/17 1041 - - - - 90 19 93 % -   08/27/17 1040 146/85 - - - 92 24 - -   08/27/17 1033 - - - - 88 23 98 % -   08/27/17 1030 148/86 - - - 89 23 98 % -   08/27/17 1025 151/90 - - - 87 22 99 % -   08/27/17 1022 - - - - 87 20 98 % -   08/27/17 1021 - - - - 88 22 98 % -   08/27/17 1020 151/86 - - - 89 21 98 % -   08/27/17 1019 - - - - 90 27 99 % -   08/27/17 1018 - - - - 87 20 97 % -   08/27/17 1017 - - - - 88 21 97 % -   08/27/17 1016 - - - - 88 20 97 % -    08/27/17 1015 150/86 - - - 87 24 97 % -   08/27/17 1014 - - - - 87 22 98 % -     General Appearance: NAD, laying in bed  Skin: normal, warm, dry  Heart: RRR  Lungs: Nonlabored resps, no cough today  Abdomen: soft, rounded, tender and more tense left side. Ileostomy liquid brown output.  Mucus fistula covered. Midline incision, c/d/i.  Right abdominal Drains: both serosang.  LLQ drain: serosang with thick tan opaque material  : Incontinent at times  Extremities: No edema. R index finger with necrotic blacked finger.  Necrotic blackened areas on right 1st & 2nd toes and left 2nd toe.  Neurologic: A&O x4, speech appropriate      Data:   CMP    Recent Labs  Lab 08/28/17 0618 08/28/17  0110  08/27/17  0630  08/24/17  0636     --   --  141  < > 139   POTASSIUM 5.5* 5.0  < > 5.9*  < > 5.5*   CHLORIDE 108  --   --  110*  < > 110*   CO2 25  --   --  24  < > 24   GLC 92  --   --  129*  < > 135*   BUN 29  --   --  31*  < > 39*   CR 0.97  --   --  0.96  < > 0.90   GFRESTIMATED 81  --   --  82  < > 88   GFRESTBLACK >90  --   --  >90  < > >90   JACOB 8.1*  --   --  8.2*  < > 8.2*   MAG 1.7  --   --  2.0  < > 1.9   PHOS 4.2  --   --  5.6*  < > 4.1   ALBUMIN 1.8*  --   --   --   --  1.7*   BILITOTAL 0.6  --   --   --   --  0.4   ALKPHOS 251*  --   --   --   --  243*   AST 44  --   --   --   --  34   ALT 19  --   --   --   --  21   < > = values in this interval not displayed.  CBC    Recent Labs  Lab 08/28/17 0618 08/27/17  0630   HGB 8.0* 8.6*   WBC 2.7* 2.6*   PLT 82* 90*     COAGS    Recent Labs  Lab 08/24/17  1406   INR 1.12      Urinalysis  Recent Labs   Lab Test  08/10/17   1752  08/08/17   1600   06/14/17   1508   04/11/16   1345   COLOR  Yellow  Yellow   < >   --    < >  Yellow   APPEARANCE  Slightly Cloudy  Slightly Cloudy   < >   --    < >  Clear   URINEGLC  Negative  Negative   < >   --    < >  30*   URINEBILI  Small   This is an unconfirmed screening test result. A positive result may be false.  *  Negative   <  >   --    < >  Negative   URINEKETONE  Negative  Negative   < >   --    < >  Negative   SG  1.012  1.010   < >   --    < >  1.016   UBLD  Negative  Negative   < >   --    < >  Small*   URINEPH  5.0  5.0   < >   --    < >  5.0   PROTEIN  30*  30*   < >   --    < >  30*   NITRITE  Negative  Negative   < >   --    < >  Negative   LEUKEST  Negative  Negative   < >   --    < >  Negative   RBCU  0  3*   < >   --    < >  1   WBCU  10*  7*   < >   --    < >  1   UTPG   --    --    --   1.55*   --   0.41*    < > = values in this interval not displayed.

## 2017-08-28 NOTE — PLAN OF CARE
"Problem: Goal Outcome Summary  Goal: Goal Outcome Summary  OT 7A: Pt declining engagement in dressing tasks, stating it is \"busy work\" and has equipment at home he can use. 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 Pt motivated for strengthening, tolerating 5 BUE exercises, 2 standing and 3 sitting with 1 # dumbbell. Rec: TCU to continue progressing strength and endurance and facilitate cognitive compensation strategies for I/ADLs.       "

## 2017-08-28 NOTE — PROGRESS NOTES
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  Now s/p ex lap with R hemicolectomy, end ileostomy, mucus fistula, partial omenectomy on 7/26.  Protracted recovery with recent concerns for infection, bowel perforation.      Low glucose overnight may have been due to glargine 10 units or may have been due to insulin in K+ cocktail.  FT was NOT replaced today, and TFs will stay off for a couple day to assess po intake.  Diet restarted this afternoon.    Plan:    - glargine - discontinued for now.  If glucoses trending up tomorrow we may re-start.  -NPH 14 units discontinued for now b/c TFs will not run for next 1-2 nights.  -meal aspart 1unit/10g CHO for meals and snacks  -correction aspart: high intensity- frequency changed to ac andhs.  -monitor glucose ac and hs.    Will continue to follow.    Please notify diabetes team if TFs are restarted.     Outpatient diabetes follow up: Dr. Eckert at [facility]               Interval History:   8/7/17 Pt upset about inpatient diabetes mgmt consult.  Transplant contacted pt's outpatient endocrinologist Dr. Eckert of [facility]-- no issue with inpatient team managing pt's glucose    Nepro to 75 cc/h x 14 h [id number]-- discontinued for now.  Primary team wants to assess po intake off TFs for a couple days.  (Pt pulled FT on 8/27, plan was to replace tube today but now holding off on replacement).  Planned for capsule endoscopy- but this is now on hold as GI thinks bleeding just from stoma.  Diet restarted this afternoon- he just finished a chocolate shake from [employer]- per internet search this is 131g  CHO.  We opted to be conservative and just cover for 100g.    Glucose dropped to the 50s overnight with just glargine 10 units on board.  He also got a K+ cocktail with D10 and insulin, which may have contributed to low glucose (however, last time he got this glucoses ran high).        Recent Labs  Lab 08/28/17  1212 08/28/17  1037 08/28/17  0851 08/28/17  0732 08/28/17  0618 08/28/17  0446 08/28/17  0258  08/27/17  0630  08/26/17  0624  08/25/17  0540  08/24/17  0636  08/23/17  0640   GLC  --   --   --   --  92  --   --   --  129*  --  163*  --  111*  --  135*  --  139*   BGM 90 108* 109* 101*  --  99 83  < >  --   < >  --   < >  --   < >  --   < >  --    < > = values in this interval not displayed.            Review of Systems:   See interval hx          Medications:   PTA taking Januvia and glargine versus glargine and aspart per carb    Active Diet Order      2 Gram K Diet     Physical Exam:  Gen: alert, resting in bed, NAD.  HEENT: mucous membranes moist, NJ feeding tube absent  Resp: normal, on RA  Neuro: answering appropriately    /85 (BP Location: Left arm)  Pulse 86  Temp 98.3  F (36.8  C) (Oral)  Resp 16  Ht 1.829 m (6')  Wt 96.5 kg (212 lb 11.2 oz)  SpO2 94%  BMI 28.85 kg/m2           Data:     Lab Results   Component Value Date    A1C  07/06/2017     Canceled, Test credited   Below Assay Range  NOTIFIED LEONARD ONEILL AT 0538 ON 7/6/17 BY CHRISSY      A1C 9.4 02/16/2017    A1C 6.2 12/14/2016    A1C 6.1 10/27/2016    A1C 8.3 07/02/2012            Recent Labs   Lab Test  08/28/17   0618  08/28/17   0110   08/27/17   0630   NA  140   --    --   141   POTASSIUM  5.5*  5.0   < >  5.9*   CHLORIDE  108   --    --   110*   CO2  25   --    --   24   ANIONGAP  6   --    --   6   GLC  92   --    --   129*   BUN  29   --    --   31*   CR  0.97   --    --   0.96   JACOB  8.1*   --    --   8.2*    < > = values in this interval not displayed.     CBC RESULTS:   Recent Labs   Lab Test  08/28/17   0618   WBC   2.7*   RBC  2.64*   HGB  8.0*   HCT  24.9*   MCV  94   MCH  30.3   MCHC  32.1   RDW  20.4*   PLT  82*       Giovana Villasenor PA-C [phone]  Diabetes Management job code [id number]

## 2017-08-28 NOTE — PLAN OF CARE
Problem: Goal Outcome Summary  Goal: Goal Outcome Summary  Outcome: No Change  /84 (BP Location: Left arm)  Pulse 83  Temp 98  F (36.7  C) (Oral)  Resp 16  Ht 1.829 m (6')  Wt 100.5 kg (221 lb 8 oz)  SpO2 93%  BMI 30.04 kg/m2  VSS on RA. Alert and oriented throughout shift. K+ cocktail started at 2100, but hypoglycemia resulted. Decreased to 25ml/hr at 2300, but BGs remained low despite increases in D10 and admin of d50-see MAR. D/c'd at 0100; K+ recheck at 5.0. 20Fr drain irrigated with no net output. Pigtail drain serous with output. Ileostomy with loose stool OP. Regular and TF off r/t self-removal of g-tube. Plan for NJ replacement today.

## 2017-08-28 NOTE — PROGRESS NOTES
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        Assessment & Recommendations:       1. Non oliguric EJ - Improving and generally at baseline while on CNI. Creat 1.5 today. Peak creat 1.94 on 8/13/17. Creat was 0.8-0.9 from 7/31 to 8/6. Had Contrast on 8/4, drain placement on 8/9, restarted Prograf on 8/10  Etiology infection, contrast, resumption of Prograf.   Baseline creat 1.4-1.7 on TAC   - Back to previous baseline while on CNI   - Continue to monitor renal function daily with chemistry labs   - Avoid hypotension/Nephrotoxins   - Renal dose medications      2. Volume status - Euvolemic. Creat has declined. No edema, hypoxia. No weight since 8/14 and that was likely inaccurate given that it was 4 kg above weight day prior!. -160/.  + unmeasured, stool 1600 cc, drain 705 cc.    - Stool output goal is 1200 cc /day per Surgery.     - Continue daily standing weights   - Continue I/O     3. HTN - Uncontrolled. -160/.  Amlodipine 10 will add HCTZ 25,     -    4. Electrolytes -   Hyperkalemia- initially improved with florinef, now is back to 5.5 to 5.9, could be related CNI her tacro level was 15, would recommends to start HCTZ, 25mg daily, cont lasix.    - Continue Florinef 0.1 mg bid     5. Acid base - Metabolic acidosis with EJ/Diarrhea. Bicarb 16. Currently on Bicarb 1300 mg TID    - Primary team working on controlling Diarrhea   - Would increase Bicarb to QID     6. BMD - Ionized Ca 4.6, Phos 4.4.      7. Antimicrobials - Ganciclovir, Zosyn, Bactrim. WBC 2.7. Afebrile. Abdominal drain placement 8/9/17. Imaging study yesterday shows decrease in size of fluid collection.      8. Liver transplant, IS - Prograf gtt. Level  33.2!           Recommendations were communicated to primary team via progress note      Patient seen and discussed  with Dr.Elfering Tl Higginbotham  Nephrology Fellow  Pager: [phone]        Interval History :       Creat improved   Remains non oliguric      Review of Systems:       I reviewed the following systems:  GI: NPO.    Neuro:  sleepy  Constitutional:  no fever or chills  CV: denies dyspnea, CP or edema.    : Voiding w/o vazquez, incontinent    Physical Exam:   I/O last 3 completed shifts:  In: 1840 [P.O.:1710; I.V.:100; Other:30]  Out: 2195 [Urine:1525; Drains:365; Stool:305]   /85 (BP Location: Left arm)  Pulse 86  Temp 98.3  F (36.8  C) (Oral)  Resp 16  Ht 1.829 m (6')  Wt 96.5 kg (212 lb 11.2 oz)  SpO2 94%  BMI 28.85 kg/m2     GENERAL APPEARANCE: sleepy but rousable  PULM: lungs CTA. Breathing is non labored. Not on supplemental oxygen  CV: tachy      -edema - none   GI: soft, NT. Staple line intact. Colostomy with large amt of liquid stool. Right sided drains with pink drainage  ; Voiding w/o vazquez, incontinent  NEURO:  sleepy    Labs:   All labs reviewed by me  Electrolytes/Renal -   Recent Labs   Lab Test  08/28/17   0618  08/28/17   0110  08/27/17   1155  08/27/17   0630  08/26/17   0624   NA  140   --    --   141  140   POTASSIUM  5.5*  5.0  5.9*  5.9*  5.7*   CHLORIDE  108   --    --   110*  109   CO2  25   --    --   24  24   BUN  29   --    --   31*  34*   CR  0.97   --    --   0.96  0.92   GLC  92   --    --   129*  163*   JACOB  8.1*   --    --   8.2*  8.1*   MAG  1.7   --    --   2.0  2.1   PHOS  4.2   --    --   5.6*  3.9       CBC -   Recent Labs   Lab Test  08/28/17   0618  08/27/17   0630  08/26/17   2341  08/26/17   1708   WBC  2.7*  2.6*   --   2.9*   HGB  8.0*  8.6*  8.2*  8.1*   PLT  82*  90*   --   88*       LFTs -   Recent Labs   Lab Test  08/28/17   0618  08/24/17   0636  08/21/17   0649   ALKPHOS  251*  243*  245*   BILITOTAL  0.6  0.4  0.4   ALT  19 21 24    AST  44  34  39   PROTTOTAL  6.5*  6.3*  6.0*   ALBUMIN  1.8*  1.7*  1.6*       Iron Panel -   Recent Labs   Lab Test  02/08/16   1144   IRON  93   IRONSAT  41       Current Medications:    hydrochlorothiazide  25 mg Oral Daily     insulin aspart  1-10 Units Subcutaneous TID AC     insulin aspart  1-7 Units Subcutaneous At Bedtime     amoxicillin-clavulanate  1 tablet Oral Q12H FRANCO     loperamide  4 mg Oral 4x Daily     amLODIPine  10 mg Oral Daily     diphenoxylate-atropine  1 tablet Oral BID     doxycycline  100 mg Oral BID     pantoprazole  40 mg Oral Daily     tacrolimus  1 mg Oral BID IS     menthol   Transdermal Q8H     protein modular  1 packet Per Feeding Tube Daily     insulin aspart   Subcutaneous TID w/meals     artificial saliva  15 mL Swish & Spit 4x Daily     heparin  5,000 Units Subcutaneous Q8H     fludrocortisone  0.1 mg Oral Daily     sodium chloride (PF)  10 mL Irrigation Q8H     fiber modular  1 packet Per Feeding Tube BID     sodium chloride (PF)  20 mL Irrigation Q6H     saccharomyces boulardii  250 mg Oral BID     sodium bicarbonate  1,300 mg Per NG tube TID     miconazole with skin protectant   Topical BID     sodium chloride (PF)  3 mL Intracatheter Q8H       dextrose 50 mL/hr at 08/28/17 0847     dextrose 50 mL/hr at 08/24/17 1607     IV fluid REPLACEMENT ONLY Stopped (08/28/17 1000)     NaCl Stopped (08/22/17 0900)     Tl Higginbotham MD

## 2017-08-29 ENCOUNTER — APPOINTMENT (OUTPATIENT)
Dept: OCCUPATIONAL THERAPY | Facility: CLINIC | Age: 52
End: 2017-08-29
Attending: TRANSPLANT SURGERY
Payer: COMMERCIAL

## 2017-08-29 ENCOUNTER — APPOINTMENT (OUTPATIENT)
Dept: PHYSICAL THERAPY | Facility: CLINIC | Age: 52
End: 2017-08-29
Attending: TRANSPLANT SURGERY
Payer: COMMERCIAL

## 2017-08-29 LAB
ANION GAP SERPL CALCULATED.3IONS-SCNC: 7 MMOL/L (ref 3–14)
BASOPHILS # BLD AUTO: 0 10E9/L (ref 0–0.2)
BASOPHILS NFR BLD AUTO: 0.8 %
BUN SERPL-MCNC: 26 MG/DL (ref 7–30)
CALCIUM SERPL-MCNC: 8.1 MG/DL (ref 8.5–10.1)
CHLORIDE SERPL-SCNC: 107 MMOL/L (ref 94–109)
CO2 SERPL-SCNC: 26 MMOL/L (ref 20–32)
CREAT SERPL-MCNC: 1.07 MG/DL (ref 0.66–1.25)
DIFFERENTIAL METHOD BLD: ABNORMAL
EOSINOPHIL # BLD AUTO: 0 10E9/L (ref 0–0.7)
EOSINOPHIL NFR BLD AUTO: 0.4 %
ERYTHROCYTE [DISTWIDTH] IN BLOOD BY AUTOMATED COUNT: 20.3 % (ref 10–15)
GFR SERPL CREATININE-BSD FRML MDRD: 72 ML/MIN/1.7M2
GLUCOSE BLDC GLUCOMTR-MCNC: 102 MG/DL (ref 70–99)
GLUCOSE BLDC GLUCOMTR-MCNC: 113 MG/DL (ref 70–99)
GLUCOSE BLDC GLUCOMTR-MCNC: 117 MG/DL (ref 70–99)
GLUCOSE BLDC GLUCOMTR-MCNC: 136 MG/DL (ref 70–99)
GLUCOSE BLDC GLUCOMTR-MCNC: 136 MG/DL (ref 70–99)
GLUCOSE SERPL-MCNC: 103 MG/DL (ref 70–99)
HCT VFR BLD AUTO: 23.4 % (ref 40–53)
HGB BLD-MCNC: 7.6 G/DL (ref 13.3–17.7)
IMM GRANULOCYTES # BLD: 0.1 10E9/L (ref 0–0.4)
IMM GRANULOCYTES NFR BLD: 3.4 %
INTERPRETATION ECG - MUSE: NORMAL
LYMPHOCYTES # BLD AUTO: 0.6 10E9/L (ref 0.8–5.3)
LYMPHOCYTES NFR BLD AUTO: 24.4 %
MAGNESIUM SERPL-MCNC: 2 MG/DL (ref 1.6–2.3)
MCH RBC QN AUTO: 29.8 PG (ref 26.5–33)
MCHC RBC AUTO-ENTMCNC: 32.5 G/DL (ref 31.5–36.5)
MCV RBC AUTO: 92 FL (ref 78–100)
MONOCYTES # BLD AUTO: 0.6 10E9/L (ref 0–1.3)
MONOCYTES NFR BLD AUTO: 23.7 %
NEUTROPHILS # BLD AUTO: 1.2 10E9/L (ref 1.6–8.3)
NEUTROPHILS NFR BLD AUTO: 47.3 %
NRBC # BLD AUTO: 0 10*3/UL
NRBC BLD AUTO-RTO: 0 /100
PHOSPHATE SERPL-MCNC: 4.3 MG/DL (ref 2.5–4.5)
PLATELET # BLD AUTO: 76 10E9/L (ref 150–450)
PLATELET # BLD EST: ABNORMAL 10*3/UL
POTASSIUM SERPL-SCNC: 5.3 MMOL/L (ref 3.4–5.3)
RBC # BLD AUTO: 2.55 10E12/L (ref 4.4–5.9)
SODIUM SERPL-SCNC: 140 MMOL/L (ref 133–144)
TACROLIMUS BLD-MCNC: 8.4 UG/L (ref 5–15)
TME LAST DOSE: NORMAL H
WBC # BLD AUTO: 2.6 10E9/L (ref 4–11)

## 2017-08-29 PROCEDURE — 12000026 ZZH R&B TRANSPLANT

## 2017-08-29 PROCEDURE — 84100 ASSAY OF PHOSPHORUS: CPT | Performed by: STUDENT IN AN ORGANIZED HEALTH CARE EDUCATION/TRAINING PROGRAM

## 2017-08-29 PROCEDURE — 25000132 ZZH RX MED GY IP 250 OP 250 PS 637: Performed by: STUDENT IN AN ORGANIZED HEALTH CARE EDUCATION/TRAINING PROGRAM

## 2017-08-29 PROCEDURE — 25000132 ZZH RX MED GY IP 250 OP 250 PS 637: Performed by: PHYSICIAN ASSISTANT

## 2017-08-29 PROCEDURE — 25000131 ZZH RX MED GY IP 250 OP 636 PS 637: Performed by: TRANSPLANT SURGERY

## 2017-08-29 PROCEDURE — 99212 OFFICE O/P EST SF 10 MIN: CPT

## 2017-08-29 PROCEDURE — 25000131 ZZH RX MED GY IP 250 OP 636 PS 637: Performed by: PHYSICIAN ASSISTANT

## 2017-08-29 PROCEDURE — 83735 ASSAY OF MAGNESIUM: CPT | Performed by: STUDENT IN AN ORGANIZED HEALTH CARE EDUCATION/TRAINING PROGRAM

## 2017-08-29 PROCEDURE — 25000128 H RX IP 250 OP 636: Performed by: NURSE PRACTITIONER

## 2017-08-29 PROCEDURE — 80197 ASSAY OF TACROLIMUS: CPT | Performed by: STUDENT IN AN ORGANIZED HEALTH CARE EDUCATION/TRAINING PROGRAM

## 2017-08-29 PROCEDURE — 40000558 ZZH STATISTIC CVC DRESSING CHANGE

## 2017-08-29 PROCEDURE — 85025 COMPLETE CBC W/AUTO DIFF WBC: CPT | Performed by: STUDENT IN AN ORGANIZED HEALTH CARE EDUCATION/TRAINING PROGRAM

## 2017-08-29 PROCEDURE — 40000133 ZZH STATISTIC OT WARD VISIT: Performed by: OCCUPATIONAL THERAPIST

## 2017-08-29 PROCEDURE — 36592 COLLECT BLOOD FROM PICC: CPT | Performed by: STUDENT IN AN ORGANIZED HEALTH CARE EDUCATION/TRAINING PROGRAM

## 2017-08-29 PROCEDURE — 97535 SELF CARE MNGMENT TRAINING: CPT | Mod: GO | Performed by: OCCUPATIONAL THERAPIST

## 2017-08-29 PROCEDURE — 25000132 ZZH RX MED GY IP 250 OP 250 PS 637: Performed by: TRANSPLANT SURGERY

## 2017-08-29 PROCEDURE — 80048 BASIC METABOLIC PNL TOTAL CA: CPT | Performed by: STUDENT IN AN ORGANIZED HEALTH CARE EDUCATION/TRAINING PROGRAM

## 2017-08-29 PROCEDURE — 40000802 ZZH SITE CHECK

## 2017-08-29 PROCEDURE — 25000128 H RX IP 250 OP 636: Performed by: PHYSICIAN ASSISTANT

## 2017-08-29 PROCEDURE — 97530 THERAPEUTIC ACTIVITIES: CPT | Mod: GP

## 2017-08-29 PROCEDURE — 00000146 ZZHCL STATISTIC GLUCOSE BY METER IP

## 2017-08-29 PROCEDURE — 40000193 ZZH STATISTIC PT WARD VISIT

## 2017-08-29 RX ORDER — LOPERAMIDE HCL 2 MG
4 CAPSULE ORAL 2 TIMES DAILY PRN
Status: DISCONTINUED | OUTPATIENT
Start: 2017-08-29 | End: 2017-09-08 | Stop reason: HOSPADM

## 2017-08-29 RX ORDER — LOPERAMIDE HCL 2 MG
2 CAPSULE ORAL 4 TIMES DAILY PRN
Status: DISCONTINUED | OUTPATIENT
Start: 2017-08-29 | End: 2017-08-29

## 2017-08-29 RX ORDER — SODIUM BICARBONATE 650 MG/1
1300 TABLET ORAL 3 TIMES DAILY
Status: DISCONTINUED | OUTPATIENT
Start: 2017-08-29 | End: 2017-08-31

## 2017-08-29 RX ORDER — SODIUM BICARBONATE 650 MG/1
1300 TABLET ORAL 4 TIMES DAILY
Status: DISCONTINUED | OUTPATIENT
Start: 2017-08-29 | End: 2017-08-29

## 2017-08-29 RX ADMIN — HYDROCHLOROTHIAZIDE 25 MG: 25 TABLET ORAL at 08:37

## 2017-08-29 RX ADMIN — LOPERAMIDE HYDROCHLORIDE 4 MG: 2 CAPSULE ORAL at 08:37

## 2017-08-29 RX ADMIN — ACETAMINOPHEN 650 MG: 325 TABLET ORAL at 09:02

## 2017-08-29 RX ADMIN — SODIUM BICARBONATE 650 MG TABLET 1300 MG: at 13:55

## 2017-08-29 RX ADMIN — TACROLIMUS 1 MG: 1 CAPSULE ORAL at 08:36

## 2017-08-29 RX ADMIN — Medication 15 ML: at 08:37

## 2017-08-29 RX ADMIN — Medication 15 ML: at 12:18

## 2017-08-29 RX ADMIN — ONDANSETRON 4 MG: 2 INJECTION INTRAMUSCULAR; INTRAVENOUS at 14:33

## 2017-08-29 RX ADMIN — SODIUM BICARBONATE 650 MG TABLET 1300 MG: at 20:43

## 2017-08-29 RX ADMIN — PANTOPRAZOLE SODIUM 40 MG: 20 TABLET, DELAYED RELEASE ORAL at 08:37

## 2017-08-29 RX ADMIN — Medication 15 ML: at 17:04

## 2017-08-29 RX ADMIN — AMOXICILLIN AND CLAVULANATE POTASSIUM 1 TABLET: 875; 125 TABLET, FILM COATED ORAL at 08:37

## 2017-08-29 RX ADMIN — AMOXICILLIN AND CLAVULANATE POTASSIUM 1 TABLET: 875; 125 TABLET, FILM COATED ORAL at 20:43

## 2017-08-29 RX ADMIN — Medication 15 ML: at 20:44

## 2017-08-29 RX ADMIN — SODIUM BICARBONATE 650 MG TABLET 1300 MG: at 08:36

## 2017-08-29 RX ADMIN — ACETAMINOPHEN 650 MG: 325 TABLET ORAL at 13:55

## 2017-08-29 RX ADMIN — AMLODIPINE BESYLATE 10 MG: 10 TABLET ORAL at 08:36

## 2017-08-29 RX ADMIN — DOXYCYCLINE HYCLATE 100 MG: 100 CAPSULE ORAL at 20:43

## 2017-08-29 RX ADMIN — TACROLIMUS 1.5 MG: 1 CAPSULE ORAL at 18:30

## 2017-08-29 RX ADMIN — DOXYCYCLINE HYCLATE 100 MG: 100 CAPSULE ORAL at 08:37

## 2017-08-29 RX ADMIN — FLUDROCORTISONE ACETATE 0.1 MG: 0.1 TABLET ORAL at 08:37

## 2017-08-29 RX ADMIN — OXYCODONE HYDROCHLORIDE 5 MG: 5 TABLET ORAL at 08:44

## 2017-08-29 RX ADMIN — HEPARIN SODIUM 5000 UNITS: 5000 INJECTION, SOLUTION INTRAVENOUS; SUBCUTANEOUS at 05:25

## 2017-08-29 NOTE — PROGRESS NOTES
"[facility] Nurse Inpatient Adult Pressure INJURY (PI) Wound Assessment     Follow up assessment-  Ileostomy    Follow up assessment of PI on- Sacrum      Data:   Patient History:      per MD note(s): This is a 51 year old male with complex PMH of CASTA[place]EDA cirrhosis s/p Liver transplant Mar 2017.  Was admitted on 2014 with abdominal pain, sepsis, CT at OSH showed \"right colonic wall thickening suggesting colitis\" underwent Ex-Lap and washout for neutropenic colitis, intra-op was noted to have inflammed cecum and ascending colon with murky fluid.  Abdomen was left open at the time and was closed on 2017.  Some concern for CMV colitis with low count CMV viremia/GI PTLD and subsequently underwent colonoscopy on :  No colitis was seen on visualized portions of mucosa.     Current mattress:  Pulsate mattress   Current pressure relieving devices: Foam dressing, pt able to turn independently     Moisture Management:  Ileostomy,      Current Diet / Nutrition:       Active Diet Order      2 Gram K Diet      Kwasi Score  Av.5  Min: 10  Max: 22       Labs:     Recent Labs   Lab Test  17   0621   17   0711   17   0949   17   0316   ALBUMIN  1.8*   < >   --    < >   --    < >   --    HGB  8.1*   < >  7.7*   < >   --    < >  7.1*   INR   --    --    --    --   1.19*   < >  1.80*   WBC  3.4*   < >  8.8   < >   --    < >  0.8*   A1C   --    --    --    --    --    --   Canceled, Test credited   Below Assay Range  NOTIFIED LEONARD ONEILL AT 0538 ON 17 BY CHRISSY     CRP   --    --   190.0*   < >   --    < >   --     < > = values in this interval not displayed.                                                                                                                      Pressure Injury Assessment  (location #1):   Sacrum  Wound History:   Pt was transferred from  to  on  now transferred back to  on . Had multiple procedures throughout the day on 17 per " RN. Pt continues to be confused. He refuses to turn at times and rolls on his back right away due to c/o pain on his hips.        Pic taken- 7/26/17                                                  Pic taken-8/10            Pic taken- 8/17/17 8/21/17        Pic taken-8/29/17    8/3/17- Unable to take picture today as pt wanted to roll on his back due to pain on his ribs  8/7/17- Unable to take picture today as pt not able to tolerate side lying due to pain on his hips  8/14/17- Phone not available to take picture today      Wound Base: 100% visible follicular buds, deep dermis    Specific Dimensions (length x width x depth, in cm) :  2 x 1.5 x0.1 cm     Tunneling:  N/A    Undermining: N/A    Palpation of the wound bed:  Firm on bone    Slough appearance: None    Eschar appearance:  none    Periwound Skin: Healed moisture associated dermatitis now blanchable erythema    Color: pink    Temperature  normal     Drainage: Moderate serous         Odor: none    Pain:  Painful         Intervention:     Patient's chart evaluated.      Kwasi Interventions:  Current Kwasi Interventions and Care Plan reviewed and updated, appropriate at this time.    Wound was assessed.    Wound Care: was done: per POC mentioned below. Also cleansed Mucus fistula and covered with adaptic and premopore dressing.    Orders  reviewed    Supplies  Reviewed    Discussed plan of care with Nurse           Assessment:     Pressure Injury (PI) located on Sacrum: healing 2  Status: wound  Follow up assessment, Symptomatic, slowly healing        Healed moisture associated dermatitis with fungal component on bilateral buttocks and inner thighs.       New ileostomy and Mucus fistula, stable. His wife knows how to change his pouch, performed return demo previously and mentioned she feels comfortable at this time.         Plan:     Nursing to notify the Provider(s) and re-consult the [facility] Nurse if wound(s)  "deteriorate(s).  Plan of care for wound located on Sacrum every other day and PRN :   Cleanse the area with NS and pat dry.  Apply No sting barrier to periwound skin.  Apply triad paste lightly only on open areas.  Cover wound with Sacral Mepilex (#[id number])  Change dressing every other day and PRN.  Turn and reposition Q 2hrs.  Ensure pt has Adolph-cushion while sitting up in the chair.  Ensure pt is on pulsate mattress.   FYI- Only use Covidien pad no  pad.    Mucus fistula- Cleanse Mucus fistula with NS and pat dry.  Cover with a piece of adaptic and cover with premopore dressing.  Change dressing daily      BL buttocks and inner thigh BID-     Recommend to discontinue antifungal cream.    WOC Nurse will return: weekly on Tuesday for sacral wound and Will stop by Friday ostomy teaching if pt is able.  Face to face time-25 mins  [facility]  WO Nurse Inpatient Ostomy Assessment       Follow up assessment of ostomy and needs for:  Ileostomy and Mucus fistula (Not assessed today, Nurses to changed the pouch twice a week from now, POC in place)      Data:   History:    This is a 51 year old male with complex PMH of CASTA[place]EDA cirrhosis s/p Liver transplant Mar 2017.  Was admitted on 7/4/2014 with abdominal pain, sepsis, CT at OSH showed \"right colonic wall thickening suggesting colitis\" underwent Ex-Lap and washout for neutropenic colitis, intra-op was noted to have inflammed cecum and ascending colon with murky fluid.  Abdomen was left open at the time and was closed on 7/5/2017.  Some concern for CMV colitis with low count CMV viremia/GI PTLD and subsequently underwent colonoscopy on 7/21:  No colitis was seen on visualized portions of mucosa.  Exam sub-optimal as colon filled with solid stool.  Random biopsies obtained.  On 7/25/2017 pt became more tachycardic, increasing O2 needs and altered, pt was intubated, hypotension responsive to IVFs, worsening kidney function.  Has not required pressors.  CT was " "obtained which showed a new large heterogeneous right sided retroperitoneal gas and air tracking along duodenum.  No contrast extravasation.  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  Despite conservative management with bowel rest and IV abx, pt continued to spike fevers.  Now s/p Exploratory laparotomy, right angelique-colectomy, end ileostomy, mucus fistula, partial omentectomy on 7/26.    Type of ostomy:  Ileostomy and Mucus fistula  Stoma assessment:   ? stoma:  11/4\" , viable, pink, pale, round, moist, and protruberant  ? A bridge in place?: No  ? Stent(s) in place?: Not applicable,   Mucocutaneous Junction (MCJ):  Intact   Peristomal skin:  Intact   Abdominal assessment: soft   ? N/G still in place?:  Yes   Output:  small, watery, green    I/O last 3 completed shifts:  In: 1740 [P.O.:1540; I.V.:90; Other:110]  Out: 2310 [Urine:1520; Drains:300; Stool:490]     Current pouching system:  Kyara, 57mm flat two piece, high output, cut to fit and flat and barrier ring with minimal melting around the cutting edges.   Pain:  Complaining of pain during cleansing  ? Is pt still on a PCA? No  Diet:       Active Diet Order      2 Gram K Diet            Intervention:     Patient's chart evaluated.      Assessments done today:  Stoma, peristomal skin, and learning needs  ? Participants: pt, his mother, and His wife Danica (ph no- [phone]). Pt continues to be confused and not able to participate.  ?  Educational intervention: method: His wife Danica performed return demo for pouch replacement using below mentioned supplies. She did a great job and feels confident to change the pouch.  Prepared for discharge by: No discharge preparations started         Assessment:     Stoma assessment: viable and healthy    Complications: None    Learning needs/ comprehension: Wife is attentive and willing to learn.    Is pt able to demonstrate: 1. how to empty their pouch?  No: Demonstrated to wife today  2. How to " read and write down correct I and O's?   No: Demonstrated to wife today    Effectiveness of current pouching/ supply plan: > 72 hrs         Plan:     Plan:   ? Current pouching system: [place] 57 mm  2 pc flat kyara high output pouch with barrier ring    Education:  ? Educational needs: practice with How to empty pouch, Removal of pouch, Preparation of new pouch, Cutting out or evaluating a pattern, Applying paste or rings, Applying appliance to abdomen, Peristomal skin care, Diet and hydration / fluid balance, Odor / flatus management and Lifestyle Adjustments    ? Continue to prepare for discharge:  Supplies ordered, Completed supply list, Registered for samples from , Prescriptions or note left on chart for MD to sign/complete, Prepared for discharge to home with homecare, Discussed making a WO Nurse outpatient appointment upon discharge, Discussed when to follow up with a WO Nurse in the future and Discussed how/ where to order supplies   ? Do WO Nurse recommend home care?  Possible TCU   Plan for ileostomy and Mucus fistula: RN to change pouch twice a week. WO will initiate teaching once pt is stable.  Supplies Needed  Kyara (2pc 57mm)  Wafer- (#[id number])  Pouch- (#[id number])  or  High output pouch - (#[id number]) depending upon output  Barrier ring- (#[id number])     Ileostomy care- Change pouch twice a week  1. Gather all supplies and remove old pouch gently.  2. Cleanse peristomal skin with w arm water and soft wash cloth or gauze and dry thoroughly.  3. Cut the wafer to fit to stoma or use given pattern, apply barrier ring around the cutting surface and apply centering the stoma.  4. Attach the pouch  5. Massage around it for better adherence.

## 2017-08-29 NOTE — PLAN OF CARE
Problem: Goal Outcome Summary  Goal: Goal Outcome Summary  OT 7A: pt. Completed full shower this am with Magdi.  Pt.transf.s; bed, shower with SBA/CGA;  ambulating throughout room/bathroom with CGA  using FWW.  Pt. Fatigued, though overall tolerated lengthy session well with VSS throughout.  Recommend d/c to rehab to increase strength/activity tolerance and indep. with ADLs/mobility.

## 2017-08-29 NOTE — PROGRESS NOTES
"CLINICAL NUTRITION SERVICES - REASSESSMENT NOTE     Nutrition Prescription    RECOMMENDATIONS FOR MDs/PROVIDERS TO ORDER:  1. Once K+ trends improve, then consider liberalizing diet order.   2. If pt is consuming less than 1720 kcals and 86 g protein daily per kcal counts, then rec restart TFs. Notify endo team if/when TFs are restarted.  Would rec resume prior TF regimen of Nepro at 75 ml/hr x 14 hours (6pm-8am) + 1 pkt ProSource TF modular + 2 pkts Nutrisource Fiber modular (or hold fiber if determined by team).      Malnutrition Status:    Severe malnutrition in the context of chronic illness    Recommendations already ordered by Registered Dietitian (RD):  Kcal counts  Discontinued TFs, free water flushes, and ProSource TF modular (no access)    Future/Additional Recommendations:  1. Consider longer-term feeding tube access if needed in the future (reliant on naso-feeding tubes since 7/10 but feeding tube pulled 8/27).  2. Monitor BG control. Hgb A1c of 9.4 on 2/16/17. BG of 103 on 8/29/17. Endo team is following.   3. If K+ trends improve and if pt should require TFs, then consider changing TFs to Peptamen 1.5, which pt was on previously. RD available for recs if needed.    4. Monitor GI status and tolerance of oral intake due to imaging 8/23.      EVALUATION OF THE PROGRESS TOWARD GOALS   Diet: 2 g Potassium diet since 8/27. Previously on a 3 g K+ diet order 8/24. Pt is frequently NPO for tests/procedures. Has oral supplement orders for \"AM- send berry Ensure Clear, PM snack - send strawberry Ensure Plus, HS snack - send apple Ensure Clear and butter pecan Ensure Plus\"  Intake: Pt consumed 75% of a meal with a good appetite 8/22-8/23. Per nursing, fair appetite. Pt often orders a few of the following items for meals: Milk, apple juice, chicken noodle soup, rice krispie bar, ham sandwich, bagel, or potato chips. Does have food from home, at times. Pt busy with nursing cares when attempting to see. Per prior " nutrition note, pt did not have much of an appetite, especially for solid foods. Per team, motivated to eat. Pt currently is room service appropriate with assist and has help ordering meals.      Kcal counts:   8/22   666 kcals and 34 g protein (meal/s and 0 supplement/s recorded)   8/23   1218 kcals and 45 g protein (meal/s and 100% of one Ensure Plus and 100% of one Ensure Clear supplement/s recorded)   8/24   No food intake recorded   8/25   801 kcals and 20 g protein (meal/s and no supplement/s recorded)   8/26   240 kcals and 4 g protein (meal/s and no supplement/s recorded)   8/27   No kcal count data record in Epic   8/28   No kcal count data record in Epic so far   *  Pt consumed a four-day average of 731 kcals and 26 g protein daily. This does not meet kcals/protein goals mentioned below.    Nutrition Support:    - Feeding Tube (FT) access:  FT dislodged (EGD earlier) and FT was pulled out 8/27. Initial feeding tube was an NJT placed on 7/10.  - TF regimen:  Nepro at goal rate of 75 mL/hr x 14 hrs (18:00-8:00) provides 1050 mL TF, 1890+ kcals (22+ kcals/kg), 85+ g protein (1+ g protein/kg), 767 mL H2O, 169 g CHO, and 13 g fiber daily.  - TF modulars:  ProSource TF (1 pkt/day). Each packet of ProSource TF modular provides 40 kcals and 11 g protein.  Nutrisource Fiber (1 pkt BID) was ordered but discontinued today (8/29) by team.  - Feeding Tube Flushes: 30 mL Q 4 hrs  - Intake: TF order active but last received TFs 8/27. Pt received a five-day TF average (8/23-8/27) of 915 mL TF/day. This provided 1647 kcals and 74 g protein daily on average. Although modulars and oral intake provide additional kcals/protein, not meeting pt's estimated needs of 4896-7243 kcals/day (30-35 kcal/kg)+ and 129-172  grams protein/day (1.5-2 grams of pro/kg)+, goal per previous nutrition note.      NEW FINDINGS   8/23 CT abd/pelvis: Overall increased size of intraperitoneal and retroperitoneal fluid collections.  Worsening small  bowel dilation, especially of the jejunum in the left upper quadrant, favored to represent adynamic ileus although developing small bowel obstruction cannot be excluded. Moderate volume ascites. Anasarca.    Ileostomy: Outputs varying from 390-2675 mL/day from 8/23-8/28. Pt receiving scheduled imodium.     WOC nurse: Continues to follow pt. See previous nutrition assessment note (8/22) for details of WOC nurse note and supplementation.     MALNUTRITION  % Intake: Not meeting this criteria.     % Weight Loss: > 20% in 1 year (severe) - Pt has lost 29% of his body wt to date. Wt loss may be, in part, due to fluid status changes post-op Tx and fluid status currently but suspect severe actual wt loss.   Subcutaneous Fat Loss: Facial region and Thoracic/intercostal: Moderate per prior nutrition note. Pt busy with nursing cares when attempting to see.   Muscle Loss: Temporal, Facial & jaw region, Thoracic region (clavicle, acromium bone, deltoid, trapezius, pectoral), and Upper leg (quadricep, hamstring): moderate to severe per prior nutrition note. Pt busy with nursing cares when attempting to see.   Fluid Accumulation/Edema: Moderate volume ascites and anasarca  Malnutrition Diagnosis: Severe malnutrition in the context of chronic illness    Previous Goals   Total avg nutritional intake to meet a minimum of 30 kcal/kg and 1.5 g PRO/kg daily (per dosing wt 86 kg).  Evaluation: Not met    Previous Nutrition Diagnosis  Inadequate oral intake related to decreased appetite, fullness AEB patient consuming ~30-35% of assessed needs per calorie counts.   Evaluation: Unresolved    CURRENT NUTRITION DIAGNOSIS  Inadequate oral intake related to decreased appetite and frequently NPO for tests/procedures as evidenced by pt consumed a four-day average of 731 kcals and 26 g protein daily which does not meet estimated needs of 2894-5662 kcals/day (30-35 kcal/kg)+ and 129-172  grams protein/day (1.5-2 grams of  pro/kg)+.    INTERVENTIONS  Implementation  1. Collaboration with other providers: Paged team regarding prior kcal count data. Discontinued TFs, free water flushes, and ProSource TF modular as pt does not have a feeding tube currently - team aware.  2. Medical food supplement therapy: Provided oral supplement menu.  3. Modify composition of meals/snacks: Provided room service menu that depicts the amounts of potassium in foods/beverages on the room service menu.   4. Ordered kcal counts 8/29-9/4.    Goals  Total average nutrition intake to meet 9692-3513 kcals/day (30-35 kcal/kg)+ and 129-172 grams protein/day (1.5-2 grams of pro/kg)+.    Monitoring/Evaluation  Progress toward goals will be monitored and evaluated per protocol.    Juanita Izquierdo, MS, RD, LD, [facility]     Nutrition will continue to follow. 7A/6D RD Pgr:  [phone]

## 2017-08-29 NOTE — PROGRESS NOTES
Immunosuppression Note:    Camacho Bhagat is a 51 year old male who is seen today  for immunosuppression management     I, Jovan Tran MD, I have examined the patient with the resident/PA/Fellow, discussed and agree with the note and findings.  I have reviewed today's vital signs, medications, labs and imaging. I reviewed the immunosuppression medications and levels. I spoke to the patient/family and explained below clinical details and answered all the questions      Transplant Surgery  Inpatient Daily Progress Note  08/29/2017    Assessment & Plan: Camacho Bhagat is a 52 year old male with history of CASTAÑEDA cirrhosis s/p liver transplant on 3/4/17. Admitted 7/4/17 from [facility] with sepsis secondary to colitis, taken to OR for initial ex-lap with findings of typhlitis in the right colon, wound left open with wound vac in place for re exploration and interval closure on 7/5/17. Concern for CMV colitis with low count CMV viremia, underwent colonoscopy on 7/21/17, biopsies obtained.  On 7/25/17 patient became septic with abdominal compartment syndrome.  CT noted new large heterogeneous right sided retroperitoneal gas and air tracking along duodenum, and patient underwent an ex-lap, right angelique-colectomy, end ileostomy, mucus fistula, partial omentectomy on 7/26/17 by colorectal surgery.  Post-op course complicated by retroperitoneal abscess/peritonitis requiring percutaneous drainage.    Graft Function: Good function. LFTs acceptable (checking every M, Th).   Immunosuppression Management:   CellCept discontinued (6/27/17) due to neutropenia.   Prograf goal 5-8, decreased to 5-6 due to infection. Dose decreased from 3 mg BID to 1 mg BID yesterday. Level 8/29 8.4, 11.5 hr trough down from 13.1 yesterday. Will increase to 1.5 mg BID.   Cardiorespiratory:   -Suspected RONNIE:  Uses O2 overnight only for brief apnea while sleeping per nursing.  -HTN: SBP 140s-160s. Continue amlodipine 10 mg daily. Added HCTZ 25 mg  daily for HTN and hyperkalemia. Pt on daily Florinef daily for hyperkalemia.   -R/o HCAP:  New productive cough on 8/24, no fever, improved.  CXR: retrocardiac opacity.  Already on Augmentin.  MRSA swab negative.  Duonebs and mycomust x 2 days, completed.  Out of bed.  Aggressive pulmonary toilet.  Hematology: Pancytopenia present on admission, persists.  Contributing factors include medications and CMV infection.  -Anemia- Hemoglobin 7.6 (8). Dropped below 7 8/26 with bloody ileostomy output, scope completed, see below. ASA held. Last blood transfusion on 8/26 (2 units). Continue to hold ASA today.   -Leukopenia/neutropenia- WBC 2.6, ANC 1.2.  Received GCSF 8/20, 8/22-25.  -Thrombocytopenia-  Plt 76 trending down. Possible due to recent bleeding. Previously dropped with linezolid.  GI:   -Neutropenic colitis on admission. Colonoscopy 7/21. Biopsy: resolving injury secondary to possible drug induced vs infectious.    -Bowel perforation: 7/25 CT scan abd/pelvis-new large heterogeneous right sided retroperitoneal gas and air tracking along duodenum.  No contrast extravasation.  No intraperitoneal air noted. Colorectal surgery performed Ex lap, right angelique-colectomy, end ileostomy, mucus fistula, partial omentectomy on 7/26. Findings no neida perforation, phlegmon/inflammation right colon. Colon pathology suggested colon perforation, negative for malignancy; CMV stain positive.    -Retroperitoneal abscess/ peritonitis: See ID section below.  -Diet:  NJ tube was dislodged, hold off on replacement for a few days per Dr. Tran to monitor patient's intake.  Low K diet with aspiration precautions.  Magen counts.  -High output  ileostomy:  Goal ileostomy output ~ 1200 cc in 24 hrs. Ileostomy output sanguinous liquid with clots 8/26. Appreciate GI recs.  Ileoscopy and EGD 8/27 showed no active bleeding or ulceration, per GI bleeding suspected from ileostomy.  No plan for capsule endoscopy at this time. Ostomy output brown  with no signs of bleeding today. Monitor.  Ostomy output decreased with stopping tube feeding. Change loperamide 4mg BID and 4 mg PO BID PRN ostomy output > 1L. Stop Lomotil BID and fiber BID.   Fluid/Electrolytes/Renal:   -EJ: on admission, d/t ATN sec to sepsis. Now resolved, Cr 1.1.  -Hypernatremia: resolved with free water.    -Hyperkalemia: Neph consulted earlier in hospitalization. 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  On daily bicarb, PRN lasix, florinef, low K diet. Reconsulted nephrology. Recommended HCTZ for HTN and hyperkalemia. Continue Florinef 0.1 mg and sodium bicarbonate.   Endocrine: DM type 2. Endocrine consulting for glycemic management.  Glargine and NPH stopped as tube feeding stopped. Continue SSI QID and carb coverage with meals.   Infectious disease: Afebrile.  WBC 2.7, ID signing off today, reference their note from 8/28 for final recs.  -Retroperitoneal abscess/peritonitis: CT C/A/P 8/9 showed a persistent gas/fluid collection RLQ, peritonitis, some free air but no evidence of contrast extravasation.  8/9 retroperitoneal drain placed, cx + clostridium septicum.  8/11 paracentesis: WBC 5,038, drain cultures + clostridium septicum (day 6, broth only).  8/14 CT scan abd/pelvis: Complex gas-containing fluid collection in the right retroperitoneum decreased slightly in size, moderate ascites with intraperitoneal gas, peritonitis noted, no bowel leak.  8/15 peritoneal drain placement: WBC 15,680, culture negative.  8/23 CT:  Fluid collections slightly larger.  Patient clinically worse with increased AMS.  8/24 IR replaced retroperitoneal drain, placed new 12F LLQ drain.  Patient improved clinically after drains placed.   8/25 fluid culture from LLQ drain, in process.  On zosyn (8/9-8/22), linezolid (8/9-8/15), switched to Augmentin (8/22-present).  -CMV viremia: Primary CMV infection.  (CMV R-D+) CMV colitis per pathology, peak serum 7/15 4225 IU/ml.   IV Ganciclovir (started 7/20).  CMV count fell to <137.  Decreased Ganciclovir to 5mg/kg on 8/18.  Switched to valcyte 900mg daily 8/22, continue until 8/26.  Check CMV quant weekly until 9/24 and then every other week for 2 months. Last check 8/24 CMV not detected. Check next 8/31.  -EBV viremia:  Peak serum 406,697 copies/ml on 7/18.   753 copies/ml on 8/15.  Check quant monthly, due 9/15.  -Cellulitis/dry gangrene:  Right 1st finger, on linezolid (8/19- 8/22), switched to doxycycline x7 days (8/22-8/28).  Clinically resolved.  -R/o HCAP: As above.  Infectious disease resolved issues:  -Severe sepsis: On admission, secondary to right colon phlegmon. Meropenem/daptomycin/micafungin tx x 10 days completed on 8/3. S/p right angelique-colectomy.  Path: CMV stain +, perforation of colon noted.   Neuro: Toxic metabolic encephalopathy secondary to infection, prolonged hospital stay.  Improved today.  Vascular:  Microvascular ischemic injury in digits (toes, finger) this is most likely due to injury while patient was on pressor therapy with sepsis. Erythema right 1st finger documented and marked, now resolved.   Activity: Deconditioned due to prolong hospital course. Daily PT/OT, encourage pt to be OOB to chair at least TID.   Prophylaxis: DVT-Heparin SQ, hold today due to recent GI bleed.  Disposition: 7A.     Medical Decision Making: Medium    PILLO/Fellow/Resident Provider: Ada Driver PA-C [id number]    Faculty: Jovan Tran MD     __________________________________________________________________  Interval History:   Overnight events:   Alert and oriented and very pleasant. Motivated to improve intake. Plan for PT/OT, OOB to chair today.       ROS:   A 10-point review of systems was negative except as noted above.    Meds:    hydrochlorothiazide  25 mg Oral Daily     insulin aspart  1-10 Units Subcutaneous TID AC     insulin aspart  1-7 Units Subcutaneous At Bedtime     amoxicillin-clavulanate  1 tablet Oral Q12H FRANCO     loperamide  4 mg Oral 4x Daily     amLODIPine  10 mg  Oral Daily     doxycycline  100 mg Oral BID     pantoprazole  40 mg Oral Daily     tacrolimus  1 mg Oral BID IS     menthol   Transdermal Q8H     protein modular  1 packet Per Feeding Tube Daily     insulin aspart   Subcutaneous TID w/meals     artificial saliva  15 mL Swish & Spit 4x Daily     heparin  5,000 Units Subcutaneous Q8H     fludrocortisone  0.1 mg Oral Daily     sodium chloride (PF)  10 mL Irrigation Q8H     sodium chloride (PF)  20 mL Irrigation Q6H     sodium bicarbonate  1,300 mg Per NG tube TID     miconazole with skin protectant   Topical BID     sodium chloride (PF)  3 mL Intracatheter Q8H       Physical Exam:     Admit Weight: 94.2 kg (207 lb 11.2 oz) (SCALE 2)    Current vitals:   /90 (BP Location: Left arm)  Pulse 87  Temp 98.6  F (37  C) (Oral)  Resp 16  Ht 1.829 m (6')  Wt 96.5 kg (212 lb 11.2 oz)  SpO2 100%  BMI 28.85 kg/m2    Vital sign ranges:    Temp:  [98.2  F (36.8  C)-98.6  F (37  C)] 98.6  F (37  C)  Pulse:  [86-87] 87  Heart Rate:  [84-95] 87  Resp:  [16] 16  BP: (143-161)/(83-90) 161/90  SpO2:  [90 %-100 %] 100 %  Patient Vitals for the past 24 hrs:   BP Temp Temp src Pulse Heart Rate Resp SpO2 Weight   08/29/17 0812 161/90 98.6  F (37  C) Oral 87 87 16 100 % -   08/29/17 0400 156/86 98.3  F (36.8  C) Oral - 84 16 97 % -   08/29/17 0017 145/83 98.4  F (36.9  C) Oral - 95 16 90 % -   08/28/17 1925 143/87 98.5  F (36.9  C) Oral 86 86 16 92 % -   08/28/17 1535 153/85 98.3  F (36.8  C) Oral 86 86 16 94 % -   08/28/17 1208 156/87 98.2  F (36.8  C) Oral 86 - 16 97 % -   08/28/17 0958 - - - - - - - 96.5 kg (212 lb 11.2 oz)     General Appearance: NAD, laying in bed  Skin: normal, warm, dry  Heart: RRR  Lungs: Nonlabored resps, no cough today  Abdomen: soft, rounded, tender and more tense left side. Ileostomy liquid brown output.  Mucus fistula covered. Midline incision, c/d/i.  Right abdominal Drains: both serosang.  LLQ drain: serosang output  : Incontinent at  times  Extremities: No edema. R index finger with necrotic blacked finger.  Necrotic blackened areas on right 1st & 2nd toes and left 2nd toe.  Neurologic: A&O x4, speech appropriate      Data:   CMP    Recent Labs  Lab 08/29/17  0547 08/28/17  0618  08/24/17  0636    140  < > 139   POTASSIUM 5.3 5.5*  < > 5.5*   CHLORIDE 107 108  < > 110*   CO2 26 25  < > 24   * 92  < > 135*   BUN 26 29  < > 39*   CR 1.07 0.97  < > 0.90   GFRESTIMATED 72 81  < > 88   GFRESTBLACK 88 >90  < > >90   JACOB 8.1* 8.1*  < > 8.2*   MAG 2.0 1.7  < > 1.9   PHOS 4.3 4.2  < > 4.1   ALBUMIN  --  1.8*  --  1.7*   BILITOTAL  --  0.6  --  0.4   ALKPHOS  --  251*  --  243*   AST  --  44  --  34   ALT  --  19  --  21   < > = values in this interval not displayed.  CBC    Recent Labs  Lab 08/29/17  0547 08/28/17  2243 08/28/17  0618   HGB 7.6* 8.0* 8.0*   WBC 2.6*  --  2.7*   PLT 76*  --  82*     COAGS    Recent Labs  Lab 08/24/17  1406   INR 1.12      Urinalysis  Recent Labs   Lab Test  08/10/17   1752  08/08/17   1600   06/14/17   1508   04/11/16   1345   COLOR  Yellow  Yellow   < >   --    < >  Yellow   APPEARANCE  Slightly Cloudy  Slightly Cloudy   < >   --    < >  Clear   URINEGLC  Negative  Negative   < >   --    < >  30*   URINEBILI  Small   This is an unconfirmed screening test result. A positive result may be false.  *  Negative   < >   --    < >  Negative   URINEKETONE  Negative  Negative   < >   --    < >  Negative   SG  1.012  1.010   < >   --    < >  1.016   UBLD  Negative  Negative   < >   --    < >  Small*   URINEPH  5.0  5.0   < >   --    < >  5.0   PROTEIN  30*  30*   < >   --    < >  30*   NITRITE  Negative  Negative   < >   --    < >  Negative   LEUKEST  Negative  Negative   < >   --    < >  Negative   RBCU  0  3*   < >   --    < >  1   WBCU  10*  7*   < >   --    < >  1   UTPG   --    --    --   1.55*   --   0.41*    < > = values in this interval not displayed.

## 2017-08-29 NOTE — PROGRESS NOTES
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 ED for evaluation and management of sepsis secondary to colitis, s/p exploratory laparotomy with findings of typhlitis in the right colon and ongoing concern for CMV colitis.  Now s/p ex lap with R hemicolectomy, end ileostomy, mucus fistula, partial omenectomy on 7/26.  Protracted recovery with recent concerns for infection, bowel perforation.     Type 2 diabetes:   Plan:  - glargine - discontinued for now.  If glucoses trending up may re-start.  -meal aspart 1unit/10g CHO for meals and snacks  -correction aspart: high intensity- frequency changed to ac andhs.  -monitor glucose ac and hs.     Will continue to follow.     Please notify diabetes team if TFs are restarted.      Outpatient diabetes follow up: Dr. Eckert at [facility]                     Interval History:   The last 24 hours progress and nursing notes reviewed.  Blood sugars in good control on meal and correction insulin.  TF on hold, on calorie counts. Discussed with Mr. Bhagat different choices to improve his calorie intake ( he does not prefer the menu).   Discussed changing to Glucerna and trying Beneprotein and mixing in hot cereal, puddings, carnation instant milk ect.  Discussed diet changes with [facility], will discuss with RD in am ( primary transplant RD not hear today)  [facility], present, assisted mr. Bhagat with ordering, asked her to order a Beneprotein for Mr. Bhagat to try.  Is working with therapies, no new issues    When on TF, NPH 14 units, Nepro to 75 cc/h x 14 h 1607-1032    Recent Labs  Lab 08/29/17  1456 08/29/17  1149 08/29/17  0547 08/29/17  0449 08/28/17  2147 08/28/17  1707 08/28/17  1212  08/28/17  0618  08/27/17  0630  08/26/17  0624   08/25/17  0540  08/24/17  0636   GLC  --   --  103*  --   --   --   --   --  92  --  129*  --  163*  --  111*  --  135*   * 113*  --  102* 124* 229* 90  < >  --   < >  --   < >  --   < >  --   < >  --    < > = values in this interval not displayed.            Review of Systems:   See interval hx          Medications:       Active Diet Order      2 Gram K Diet     Physical Exam:  Gen: Alert, resting in bed, in NAD   HEENT: NC/AT, mucous membranes are moist  Resp: Unlabored  Ext: moving all extremities  Neuro:oriented x3, communicating clearly  /73 (BP Location: Left arm)  Pulse 81  Temp 98  F (36.7  C) (Oral)  Resp 16  Ht 1.829 m (6')  Wt 96.5 kg (212 lb 12.8 oz)  SpO2 93%  BMI 28.86 kg/m2           Data:     Lab Results   Component Value Date    A1C  07/06/2017     Canceled, Test credited   Below Assay Range  NOTIFIED LEONARD ONEILL AT 0538 ON 7/6/17 BY CHRISSY      A1C 9.4 02/16/2017    A1C 6.2 12/14/2016    A1C 6.1 10/27/2016    A1C 8.3 07/02/2012              CBC RESULTS:   Recent Labs   Lab Test  08/29/17   0547   WBC  2.6*   RBC  2.55*   HGB  7.6*   HCT  23.4*   MCV  92   MCH  29.8   MCHC  32.5   RDW  20.3*   PLT  76*     Recent Labs   Lab Test  08/29/17   0547  08/28/17   0618   NA  140  140   POTASSIUM  5.3  5.5*   CHLORIDE  107  108   CO2  26  25   ANIONGAP  7  6   GLC  103*  92   BUN  26  29   CR  1.07  0.97   JACOB  8.1*  8.1*     Liver Function Studies -   Recent Labs   Lab Test  08/28/17   0618   PROTTOTAL  6.5*   ALBUMIN  1.8*   BILITOTAL  0.6   ALKPHOS  251*   AST  44   ALT  19     Lab Results   Component Value Date    INR 1.12 08/24/2017    INR 1.19 08/05/2017    INR 1.31 07/26/2017    INR 1.31 07/26/2017    INR 1.30 07/26/2017    INR 1.23 07/25/2017    INR 1.11 07/24/2017    INR 1.24 07/21/2017    INR 1.19 07/17/2017    INR 1.17 07/10/2017    INR 1.22 07/08/2017    INR 1.80 07/06/2017         I spent a total of 35 minutes bedside and on the inpatient unit managing the glycemic care of  Camacho Bhagat. Over 50% of my time on the unit was spent counseling the patient and/or coordinating care.      Alesha Sauer -[phone]  Diabetes Management job code [phone]

## 2017-08-29 NOTE — PLAN OF CARE
Problem: Goal Outcome Summary  Goal: Goal Outcome Summary  PT 7A: pt declined OOB however spent time discussing pressure offloading and extensive role of PT in hospital. Will see at set time moving forward.

## 2017-08-29 NOTE — PROGRESS NOTES
Calorie Counts  Intake recorded for: 8/28 Kcals: 539  Protein: 27  # Meals Recorded 100% 8 oz milk, rice kripsy bar, meat sauce with 50% penne pasta   # Supplements Recorded 100% 1 Ensure Clear

## 2017-08-29 NOTE — PLAN OF CARE
Problem: Pain, Acute (Adult)  Goal: Identify Related Risk Factors and Signs and Symptoms  Related risk factors and signs and symptoms are identified upon initiation of Human Response Clinical Practice Guideline (CPG)   Outcome: No Change  BP remains slightly elevated. Satting in the 80%'s on RA. O2 at 2 L/ NC overnight. Satting mid to high 90%'s on 2L. OVSS. BS-102. Denies pain or nausea. Voided 450 and had 250 out of illeostomy. Drains are not putting out much. Slept well.

## 2017-08-29 NOTE — PLAN OF CARE
Problem: Goal Outcome Summary  Goal: Goal Outcome Summary  VSS on RA. BGs were 229 and 124. Pt denies nausea. Pt c/o pain and got BID PRN oxy x 1. Pts ileostomy is patent and draining. Pt is voiding adequate amounts of urine into bedside urinal. Pt is alert and oriented. No confusion observed during shift, however, bed alarm still on tonight. Pt repositioned q2h d/t sacral wound pain. Right drains irrigated during shift. All drains are patent and draining. Pt is on a 2g K+ diet with a fair appetite. Pt is resting comfortably now. Will continue to monitor care.

## 2017-08-29 NOTE — PLAN OF CARE
Problem: Goal Outcome Summary  Goal: Goal Outcome Summary  Outcome: Improving  VSS, afeb. Still requiring some Oxygen when supine/napping, o/w on room air. C/o pain in his back.had one episode of sharp pain in his left flank around the drain site. All drains patent and irrigated per orders. Ileostomy intact with soft stool.  Ambulating with walker and SBA today. Showered. Feeling more hopeful, per pt. Oxycodone and tylenol for pain, prn.

## 2017-08-30 ENCOUNTER — APPOINTMENT (OUTPATIENT)
Dept: PHYSICAL THERAPY | Facility: CLINIC | Age: 52
End: 2017-08-30
Attending: TRANSPLANT SURGERY
Payer: COMMERCIAL

## 2017-08-30 ENCOUNTER — APPOINTMENT (OUTPATIENT)
Dept: OCCUPATIONAL THERAPY | Facility: CLINIC | Age: 52
End: 2017-08-30
Attending: TRANSPLANT SURGERY
Payer: COMMERCIAL

## 2017-08-30 LAB
ANION GAP SERPL CALCULATED.3IONS-SCNC: 5 MMOL/L (ref 3–14)
ANISOCYTOSIS BLD QL SMEAR: ABNORMAL
BASOPHILS # BLD AUTO: 0.1 10E9/L (ref 0–0.2)
BASOPHILS NFR BLD AUTO: 2.7 %
BUN SERPL-MCNC: 24 MG/DL (ref 7–30)
CALCIUM SERPL-MCNC: 7.9 MG/DL (ref 8.5–10.1)
CHLORIDE SERPL-SCNC: 106 MMOL/L (ref 94–109)
CHLORIDE UR-SCNC: 77 MMOL/L
CO2 SERPL-SCNC: 27 MMOL/L (ref 20–32)
CREAT SERPL-MCNC: 1.03 MG/DL (ref 0.66–1.25)
DIFFERENTIAL METHOD BLD: ABNORMAL
EOSINOPHIL # BLD AUTO: 0 10E9/L (ref 0–0.7)
EOSINOPHIL NFR BLD AUTO: 0.9 %
ERYTHROCYTE [DISTWIDTH] IN BLOOD BY AUTOMATED COUNT: 20.5 % (ref 10–15)
GFR SERPL CREATININE-BSD FRML MDRD: 76 ML/MIN/1.7M2
GLUCOSE BLDC GLUCOMTR-MCNC: 111 MG/DL (ref 70–99)
GLUCOSE BLDC GLUCOMTR-MCNC: 113 MG/DL (ref 70–99)
GLUCOSE BLDC GLUCOMTR-MCNC: 113 MG/DL (ref 70–99)
GLUCOSE BLDC GLUCOMTR-MCNC: 114 MG/DL (ref 70–99)
GLUCOSE BLDC GLUCOMTR-MCNC: 118 MG/DL (ref 70–99)
GLUCOSE BLDC GLUCOMTR-MCNC: 165 MG/DL (ref 70–99)
GLUCOSE BLDC GLUCOMTR-MCNC: 95 MG/DL (ref 70–99)
GLUCOSE SERPL-MCNC: 152 MG/DL (ref 70–99)
HCT VFR BLD AUTO: 22.8 % (ref 40–53)
HGB BLD-MCNC: 7.4 G/DL (ref 13.3–17.7)
LYMPHOCYTES # BLD AUTO: 0.6 10E9/L (ref 0.8–5.3)
LYMPHOCYTES NFR BLD AUTO: 21.2 %
MACROCYTES BLD QL SMEAR: PRESENT
MAGNESIUM SERPL-MCNC: 1.9 MG/DL (ref 1.6–2.3)
MCH RBC QN AUTO: 30.5 PG (ref 26.5–33)
MCHC RBC AUTO-ENTMCNC: 32.5 G/DL (ref 31.5–36.5)
MCV RBC AUTO: 94 FL (ref 78–100)
MONOCYTES # BLD AUTO: 0.5 10E9/L (ref 0–1.3)
MONOCYTES NFR BLD AUTO: 16.8 %
MYELOCYTES # BLD: 0 10E9/L
MYELOCYTES NFR BLD MANUAL: 1.8 %
NEUTROPHILS # BLD AUTO: 1.5 10E9/L (ref 1.6–8.3)
NEUTROPHILS NFR BLD AUTO: 56.6 %
OSMOLALITY UR: 352 MMOL/KG (ref 100–1200)
PHOSPHATE SERPL-MCNC: 4.5 MG/DL (ref 2.5–4.5)
PLATELET # BLD AUTO: 68 10E9/L (ref 150–450)
PLATELET # BLD EST: ABNORMAL 10*3/UL
POTASSIUM SERPL-SCNC: 5 MMOL/L (ref 3.4–5.3)
POTASSIUM UR-SCNC: 32 MMOL/L
RBC # BLD AUTO: 2.43 10E12/L (ref 4.4–5.9)
SODIUM SERPL-SCNC: 138 MMOL/L (ref 133–144)
SODIUM UR-SCNC: 60 MMOL/L
TACROLIMUS BLD-MCNC: 7 UG/L (ref 5–15)
TME LAST DOSE: NORMAL H
WBC # BLD AUTO: 2.7 10E9/L (ref 4–11)

## 2017-08-30 PROCEDURE — 84100 ASSAY OF PHOSPHORUS: CPT | Performed by: STUDENT IN AN ORGANIZED HEALTH CARE EDUCATION/TRAINING PROGRAM

## 2017-08-30 PROCEDURE — 40000802 ZZH SITE CHECK

## 2017-08-30 PROCEDURE — 97530 THERAPEUTIC ACTIVITIES: CPT | Mod: GO

## 2017-08-30 PROCEDURE — 84244 ASSAY OF RENIN: CPT | Performed by: STUDENT IN AN ORGANIZED HEALTH CARE EDUCATION/TRAINING PROGRAM

## 2017-08-30 PROCEDURE — 86022 PLATELET ANTIBODIES: CPT | Performed by: PHYSICIAN ASSISTANT

## 2017-08-30 PROCEDURE — 40000133 ZZH STATISTIC OT WARD VISIT

## 2017-08-30 PROCEDURE — 83735 ASSAY OF MAGNESIUM: CPT | Performed by: STUDENT IN AN ORGANIZED HEALTH CARE EDUCATION/TRAINING PROGRAM

## 2017-08-30 PROCEDURE — 40000193 ZZH STATISTIC PT WARD VISIT

## 2017-08-30 PROCEDURE — 25000131 ZZH RX MED GY IP 250 OP 636 PS 637: Performed by: PHYSICIAN ASSISTANT

## 2017-08-30 PROCEDURE — 00000146 ZZHCL STATISTIC GLUCOSE BY METER IP

## 2017-08-30 PROCEDURE — 85025 COMPLETE CBC W/AUTO DIFF WBC: CPT | Performed by: STUDENT IN AN ORGANIZED HEALTH CARE EDUCATION/TRAINING PROGRAM

## 2017-08-30 PROCEDURE — 36592 COLLECT BLOOD FROM PICC: CPT | Performed by: PHYSICIAN ASSISTANT

## 2017-08-30 PROCEDURE — 83935 ASSAY OF URINE OSMOLALITY: CPT | Performed by: INTERNAL MEDICINE

## 2017-08-30 PROCEDURE — 82088 ASSAY OF ALDOSTERONE: CPT | Performed by: STUDENT IN AN ORGANIZED HEALTH CARE EDUCATION/TRAINING PROGRAM

## 2017-08-30 PROCEDURE — 12000026 ZZH R&B TRANSPLANT

## 2017-08-30 PROCEDURE — 97116 GAIT TRAINING THERAPY: CPT | Mod: GP

## 2017-08-30 PROCEDURE — 84133 ASSAY OF URINE POTASSIUM: CPT | Performed by: INTERNAL MEDICINE

## 2017-08-30 PROCEDURE — 82436 ASSAY OF URINE CHLORIDE: CPT | Performed by: INTERNAL MEDICINE

## 2017-08-30 PROCEDURE — 25000132 ZZH RX MED GY IP 250 OP 250 PS 637: Performed by: TRANSPLANT SURGERY

## 2017-08-30 PROCEDURE — 80197 ASSAY OF TACROLIMUS: CPT | Performed by: STUDENT IN AN ORGANIZED HEALTH CARE EDUCATION/TRAINING PROGRAM

## 2017-08-30 PROCEDURE — 97530 THERAPEUTIC ACTIVITIES: CPT | Mod: GP

## 2017-08-30 PROCEDURE — 84300 ASSAY OF URINE SODIUM: CPT | Performed by: INTERNAL MEDICINE

## 2017-08-30 PROCEDURE — 80048 BASIC METABOLIC PNL TOTAL CA: CPT | Performed by: STUDENT IN AN ORGANIZED HEALTH CARE EDUCATION/TRAINING PROGRAM

## 2017-08-30 PROCEDURE — 25000128 H RX IP 250 OP 636: Performed by: NURSE PRACTITIONER

## 2017-08-30 PROCEDURE — 25000132 ZZH RX MED GY IP 250 OP 250 PS 637: Performed by: PHYSICIAN ASSISTANT

## 2017-08-30 PROCEDURE — 97110 THERAPEUTIC EXERCISES: CPT | Mod: GO

## 2017-08-30 PROCEDURE — 36592 COLLECT BLOOD FROM PICC: CPT | Performed by: STUDENT IN AN ORGANIZED HEALTH CARE EDUCATION/TRAINING PROGRAM

## 2017-08-30 RX ADMIN — OXYCODONE HYDROCHLORIDE 5 MG: 5 TABLET ORAL at 10:13

## 2017-08-30 RX ADMIN — AMOXICILLIN AND CLAVULANATE POTASSIUM 1 TABLET: 875; 125 TABLET, FILM COATED ORAL at 21:08

## 2017-08-30 RX ADMIN — INSULIN ASPART 1 UNITS: 100 INJECTION, SOLUTION INTRAVENOUS; SUBCUTANEOUS at 08:01

## 2017-08-30 RX ADMIN — OXYCODONE HYDROCHLORIDE 5 MG: 5 TABLET ORAL at 23:40

## 2017-08-30 RX ADMIN — SODIUM BICARBONATE 650 MG TABLET 1300 MG: at 21:08

## 2017-08-30 RX ADMIN — AMLODIPINE BESYLATE 10 MG: 10 TABLET ORAL at 08:04

## 2017-08-30 RX ADMIN — ACETAMINOPHEN 650 MG: 325 TABLET ORAL at 23:40

## 2017-08-30 RX ADMIN — AMOXICILLIN AND CLAVULANATE POTASSIUM 1 TABLET: 875; 125 TABLET, FILM COATED ORAL at 08:04

## 2017-08-30 RX ADMIN — ONDANSETRON 4 MG: 2 INJECTION INTRAMUSCULAR; INTRAVENOUS at 03:18

## 2017-08-30 RX ADMIN — Medication 15 ML: at 21:10

## 2017-08-30 RX ADMIN — SODIUM BICARBONATE 650 MG TABLET 1300 MG: at 14:44

## 2017-08-30 RX ADMIN — PANTOPRAZOLE SODIUM 40 MG: 20 TABLET, DELAYED RELEASE ORAL at 08:04

## 2017-08-30 RX ADMIN — HYDROCHLOROTHIAZIDE 25 MG: 25 TABLET ORAL at 08:03

## 2017-08-30 RX ADMIN — TACROLIMUS 1.5 MG: 1 CAPSULE ORAL at 08:03

## 2017-08-30 RX ADMIN — Medication 15 ML: at 08:11

## 2017-08-30 RX ADMIN — ACETAMINOPHEN 650 MG: 325 TABLET ORAL at 10:13

## 2017-08-30 RX ADMIN — TACROLIMUS 1.5 MG: 1 CAPSULE ORAL at 21:08

## 2017-08-30 RX ADMIN — FLUDROCORTISONE ACETATE 0.1 MG: 0.1 TABLET ORAL at 08:04

## 2017-08-30 RX ADMIN — SODIUM BICARBONATE 650 MG TABLET 1300 MG: at 08:03

## 2017-08-30 NOTE — PROGRESS NOTES
Immunosuppression Note:    Camacho Bhagat is a 51 year old male who is seen today  for immunosuppression management     I, Jovan Tran MD, I have examined the patient with the resident/PA/Fellow, discussed and agree with the note and findings.  I have reviewed today's vital signs, medications, labs and imaging. I reviewed the immunosuppression medications and levels. I spoke to the patient/family and explained below clinical details and answered all the questions      Transplant Surgery  Inpatient Daily Progress Note  08/30/2017    Assessment & Plan: Camacho Bhagat is a 52 year old male with history of CASTAÑEDA cirrhosis s/p liver transplant on 3/4/17. Admitted 7/4/17 from [facility] with sepsis secondary to colitis, taken to OR for initial ex-lap with findings of typhlitis in the right colon, wound left open with wound vac in place for re exploration and interval closure on 7/5/17. Concern for CMV colitis with low count CMV viremia, underwent colonoscopy on 7/21/17, biopsies obtained.  On 7/25/17 patient became septic with abdominal compartment syndrome.  CT noted new large heterogeneous right sided retroperitoneal gas and air tracking along duodenum, and patient underwent an ex-lap, right angelique-colectomy, end ileostomy, mucus fistula, partial omentectomy on 7/26/17 by colorectal surgery.  Post-op course complicated by retroperitoneal abscess/peritonitis requiring percutaneous drainage.    Graft Function: Good function. LFTs acceptable (checking every M, Th).   Immunosuppression Management:   CellCept discontinued (6/27/17) due to neutropenia.   Prograf goal, resume goal 6-8. 8/29 level 8.4, 11.5.  Continue 1.5 mg BID. Check level tomorrow.  Cardiorespiratory:   -Suspected RONNIE:  Uses O2 overnight only for brief apnea while sleeping per nursing.  -HTN: SBP 120s-160s. Continue amlodipine 10 mg daily. Added HCTZ 25 mg daily for HTN and hyperkalemia. Pt on daily Florinef daily for hyperkalemia.   -R/o HCAP:  New  productive cough on 8/24, no fever, improved.  CXR: retrocardiac opacity.  Already on Augmentin.  MRSA swab negative.  Duonebs and mycomust x 2 days, completed.  Out of bed. Aggressive pulmonary toilet.  Hematology: Pancytopenia present on admission, persists.  Contributing factors include medications and CMV infection.  -Anemia- Hemoglobin 7.4 (7.6). Dropped below 7 8/26 with bloody ileostomy output, scope completed, see below. Blood transfusion on 8/26 (2 units). PLT trending down. Possible due to recent bleed. Med list reviewed. Send HIT assay. ASA for HAT ppx, will stop as pt ~ 6 months. Will not restart Hep ppx.   -Leukopenia/neutropenia- WBC 2.7, ANC > 800.  Received GCSF 8/20, 8/22-25.  -Thrombocytopenia-  Plt 76 trending down. Possible due to recent bleeding. Previously dropped with linezolid.  GI:   -Neutropenic colitis on admission. Colonoscopy 7/21. Biopsy: resolving injury secondary to possible drug induced vs infectious.    -Bowel perforation: 7/25 CT scan abd/pelvis-new large heterogeneous right sided retroperitoneal gas and air tracking along duodenum.  No contrast extravasation.  No intraperitoneal air noted. Colorectal surgery performed Ex lap, right angelique-colectomy, end ileostomy, mucus fistula, partial omentectomy on 7/26. Findings no neida perforation, phlegmon/inflammation right colon. Colon pathology suggested colon perforation, negative for malignancy; CMV stain positive.    -Retroperitoneal abscess/ peritonitis: See ID section below.  -Diet:  NJ tube was dislodged, hold off on replacement for a few days per Dr. Tran to monitor patient's intake.  Low K diet with aspiration precautions.  Magen counts.  -High output  ileostomy:  Goal ileostomy output ~ 1200 cc in 24 hrs. Ileostomy output sanguinous liquid with clots 8/26. Appreciate GI recs.  Ileoscopy and EGD 8/27 showed no active bleeding or ulceration, per GI bleeding suspected from ileostomy.  No plan for capsule endoscopy at this time.  Ostomy output brown with no signs of bleeding today. Monitor.  Ostomy output decreased with stopping tube feeding. Change loperamide 4mg BID and 4 mg PO BID PRN ostomy output > 1L. Stopped Lomotil BID and fiber BID.   Fluid/Electrolytes/Renal:   -EJ: on admission, d/t ATN sec to sepsis. Now resolved, Cr 1.1.  -Hypernatremia: resolved with free water.    -Hyperkalemia: Neph consulted earlier in hospitalization. 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  On daily bicarb, PRN lasix, florinef, low K diet. Reconsulted nephrology. Recommended HCTZ for HTN and hyperkalemia. Continue Florinef 0.1 mg and sodium bicarbonate.   Endocrine: DM type 2. Endocrine consulting for glycemic management.  Glargine and NPH stopped as tube feeding stopped. Continue SSI QID and carb coverage with meals.   Infectious disease: Afebrile.  WBC 2.7, ID signing off today, reference their note from 8/28 for final recs.  -Retroperitoneal abscess/peritonitis: CT C/A/P 8/9 showed a persistent gas/fluid collection RLQ, peritonitis, some free air but no evidence of contrast extravasation.  8/9 retroperitoneal drain placed, cx + clostridium septicum.  8/11 paracentesis: WBC 5,038, drain cultures + clostridium septicum (day 6, broth only).  8/14 CT scan abd/pelvis: Complex gas-containing fluid collection in the right retroperitoneum decreased slightly in size, moderate ascites with intraperitoneal gas, peritonitis noted, no bowel leak.  8/15 peritoneal drain placement: WBC 15,680, culture negative.  8/23 CT:  Fluid collections slightly larger.  Patient clinically worse with increased AMS.  8/24 IR replaced retroperitoneal drain, placed new 12F LLQ drain.  Patient improved clinically after drains placed.   8/25 fluid culture from LLQ drain, budding yeast, pt doing well. Will monitor. Repeat imaging in tomorrow to evaluate fluid collections. Plan to stop Augmentin next week (4 weeks on 9/6).   Abx:  zosyn (8/9-8/22), linezolid (8/9-8/15), switched to Augmentin (8/22-present).    -CMV viremia: Primary CMV infection.  (CMV R-D+) CMV colitis per pathology, peak serum 7/15 4225 IU/ml.   IV Ganciclovir (started 7/20).  CMV count fell to <137. Decreased Ganciclovir to 5mg/kg on 8/18.  Switched to valcyte 900mg daily 8/22, continue until 8/26.  Check CMV quant weekly until 9/24 and then every other week for 2 months. Last check 8/24 CMV not detected. Check next 8/31.  -EBV viremia:  Peak serum 406,697 copies/ml on 7/18.   753 copies/ml on 8/15.  Check quant monthly, due 9/15.  -Cellulitis/dry gangrene:  Right 1st finger, on linezolid (8/19- 8/22), switched to doxycycline x7 days (8/22-8/28).  Clinically resolved.  -R/o HCAP: As above.  Infectious disease resolved issues:  -Severe sepsis: On admission, secondary to right colon phlegmon. Meropenem/daptomycin/micafungin tx x 10 days completed on 8/3. S/p right angelique-colectomy.  Path: CMV stain +, perforation of colon noted.   Neuro: Toxic metabolic encephalopathy secondary to infection, prolonged hospital stay.  Improved today.  Vascular:  Microvascular ischemic injury in digits (toes, finger) this is most likely due to injury while patient was on pressor therapy with sepsis. Erythema right 1st finger documented and marked, now resolved.   Activity: Deconditioned due to prolong hospital course. Daily PT/OT, encourage pt to be OOB to chair at least TID.   Prophylaxis: DVT-Heparin SQ, hold today due to bleeding.  Disposition: 7A.     Medical Decision Making: Medium    PILLO/Fellow/Resident Provider: Ada Driver PA-C [id number]    Faculty: Jovan Tran MD     __________________________________________________________________  Interval History:   Overnight events:   Tolerating diet. Ambulating with assist. Stool output reddish brown. Discussed plan with patient for PT/OT and OOB to chair as much as possible.       ROS:   A 10-point review of systems was negative except as noted above.    Meds:    tacrolimus  1.5 mg Oral BID IS     sodium bicarbonate   1,300 mg Per NG tube TID     hydrochlorothiazide  25 mg Oral Daily     insulin aspart  1-10 Units Subcutaneous TID AC     insulin aspart  1-7 Units Subcutaneous At Bedtime     amoxicillin-clavulanate  1 tablet Oral Q12H FRANCO     amLODIPine  10 mg Oral Daily     pantoprazole  40 mg Oral Daily     menthol   Transdermal Q8H     insulin aspart   Subcutaneous TID w/meals     artificial saliva  15 mL Swish & Spit 4x Daily     fludrocortisone  0.1 mg Oral Daily     sodium chloride (PF)  10 mL Irrigation Q8H     sodium chloride (PF)  20 mL Irrigation Q6H     miconazole with skin protectant   Topical BID     sodium chloride (PF)  3 mL Intracatheter Q8H       Physical Exam:     Admit Weight: 94.2 kg (207 lb 11.2 oz) (SCALE 2)    Current vitals:   /83 (BP Location: Left arm)  Pulse 84  Temp 98  F (36.7  C) (Oral)  Resp 14  Ht 1.829 m (6')  Wt 95.5 kg (210 lb 8.6 oz)  SpO2 93%  BMI 28.55 kg/m2    Vital sign ranges:    Temp:  [98  F (36.7  C)-98.9  F (37.2  C)] 98  F (36.7  C)  Pulse:  [81-84] 84  Heart Rate:  [79-85] 82  Resp:  [14-18] 14  BP: (119-147)/(73-90) 145/83  SpO2:  [93 %-98 %] 93 %  Patient Vitals for the past 24 hrs:   BP Temp Temp src Pulse Heart Rate Resp SpO2 Weight   08/30/17 0832 - 98  F (36.7  C) Oral - - - - -   08/30/17 0808 145/83 - - 84 - 14 93 % -   08/30/17 0414 - - - - - - - 95.5 kg (210 lb 8.6 oz)   08/30/17 0326 138/79 98  F (36.7  C) Oral - 82 16 95 % -   08/29/17 2342 147/90 98.7  F (37.1  C) Oral - 83 18 94 % -   08/29/17 2050 145/87 98.9  F (37.2  C) Oral - 85 18 98 % -   08/29/17 1613 139/88 98.3  F (36.8  C) Oral - 79 18 96 % -   08/29/17 1300 - - - - - - - 96.5 kg (212 lb 12.8 oz)   08/29/17 1151 119/73 98  F (36.7  C) Oral 81 81 16 93 % -     General Appearance: NAD, laying in bed  Skin: normal, warm, dry  Heart: RRR  Lungs: Nonlabored resps  Abdomen: soft, rounded, mild tender LQ. Ileostomy liquid reddish brown.  Mucus fistula covered. Midline incision, c/d/i.  Right abdominal  drains: both serosang.  LLQ drain: serosang output  : Incontinent at times  Extremities: No edema. R index finger with necrotic blacked finger.  Necrotic blackened areas on right 1st & 2nd toes and left 2nd toe.  Neurologic: A&O x4, speech appropriate      Data:   CMP    Recent Labs  Lab 08/30/17  0708 08/29/17  0547 08/28/17  0618  08/24/17  0636    140 140  < > 139   POTASSIUM 5.0 5.3 5.5*  < > 5.5*   CHLORIDE 106 107 108  < > 110*   CO2 27 26 25  < > 24   * 103* 92  < > 135*   BUN 24 26 29  < > 39*   CR 1.03 1.07 0.97  < > 0.90   GFRESTIMATED 76 72 81  < > 88   GFRESTBLACK >90 88 >90  < > >90   JACOB 7.9* 8.1* 8.1*  < > 8.2*   MAG 1.9 2.0 1.7  < > 1.9   PHOS 4.5 4.3 4.2  < > 4.1   ALBUMIN  --   --  1.8*  --  1.7*   BILITOTAL  --   --  0.6  --  0.4   ALKPHOS  --   --  251*  --  243*   AST  --   --  44  --  34   ALT  --   --  19  --  21   < > = values in this interval not displayed.  CBC    Recent Labs  Lab 08/30/17  0708 08/29/17  0547   HGB 7.4* 7.6*   WBC 2.7* 2.6*   PLT 68* 76*     COAGS    Recent Labs  Lab 08/24/17  1406   INR 1.12      Urinalysis  Recent Labs   Lab Test  08/10/17   1752  08/08/17   1600   06/14/17   1508   04/11/16   1345   COLOR  Yellow  Yellow   < >   --    < >  Yellow   APPEARANCE  Slightly Cloudy  Slightly Cloudy   < >   --    < >  Clear   URINEGLC  Negative  Negative   < >   --    < >  30*   URINEBILI  Small   This is an unconfirmed screening test result. A positive result may be false.  *  Negative   < >   --    < >  Negative   URINEKETONE  Negative  Negative   < >   --    < >  Negative   SG  1.012  1.010   < >   --    < >  1.016   UBLD  Negative  Negative   < >   --    < >  Small*   URINEPH  5.0  5.0   < >   --    < >  5.0   PROTEIN  30*  30*   < >   --    < >  30*   NITRITE  Negative  Negative   < >   --    < >  Negative   LEUKEST  Negative  Negative   < >   --    < >  Negative   RBCU  0  3*   < >   --    < >  1   WBCU  10*  7*   < >   --    < >  1   UTPG   --    --    --    1.55*   --   0.41*    < > = values in this interval not displayed.

## 2017-08-30 NOTE — PLAN OF CARE
Problem: Goal Outcome Summary  Goal: Goal Outcome Summary  Outcome: Improving  Pt afebrile, slightly hypertensive, needs O2 via NC at times when supine/sleeping. Pt endorsed feeling generally uncomfortable although declined prn meds. Drains were flushed with minimal serous and serosanguinous output. Blood sugars were 117 and 136. Carb coverage needed at dinner for 22carbs, 2 units novolog given. Pt using bedside urinal, has not been incontinent of urine this shift. Ileostomy with large soft brown output this shift. Pt ambulated the unit with walker with SBA this evening. Pt has been declining antifungal cream administration consistently. Continue POC, notify provider with concerns.

## 2017-08-30 NOTE — PLAN OF CARE
Problem: Goal Outcome Summary  Goal: Goal Outcome Summary  PT 7A: Pt tolerated session well. Walked 750' around 7th floor with walker and no physical assist. Education and repositioning in bed also provided. Some mild difficulty with standing up from lower surfaces. Pending stair assessment, feel that pt should be able to d/c home with assist and home PT.

## 2017-08-30 NOTE — PROGRESS NOTES
Calorie Counts  Intake recorded for: 8/29 Kcals: 664  Protein: 25g  # Meals Recorded: 75% scrambled eggs, sausage eliane, corn muffin with butter, apple, 4 oz 1% milk, 8 oz apple juice  # Supplements Recorded: 75% 1 packet Beneprotein (was mixed with scrambled eggs)

## 2017-08-30 NOTE — PROGRESS NOTES
Nephrology attending    S: Doing well this morning without nausea, vomiting, fevers, chills, diarrhea. 4pt ROS negative.    O:   98   P84   R14   138/79   145/83   93% on RA  Gen - nad  HENT - neck supple  CV - rrr, no mgr  Resp - slight bilateral crackles  Ext - trace edema  Psych - pleasant, appropriate    Labs noted  K 5 from 5.3  Urine K 32    Medications reviewed    A/Rec: 50yo M s/p liver transplant with hyperkalemia.  Hyperkalemia - stable with florinef and HCTZ. Urine K not strikingly low but TTKG (not utilized as much anymore) <7 consistent with inadequate urine excretion.   - consider switch from HCTZ to furosemide 40mg BID    Volume/BP - overloaded, reasonably well controlled. Continue amlodipine.    Acid-base - no recent ABG, bicarb 27 on NaHCO3. Continue for now.   - decrease NaHCO3 to 650mg TID    Isidro Jones MD  [phone]

## 2017-08-30 NOTE — PLAN OF CARE
Problem: Goal Outcome Summary  Goal: Goal Outcome Summary  OT 7A: Pt completing ~350 feet functional mobility SBA with FWW and one standing rest break to facilitate strength and endurance as motivated to continue progressing mobility. During ambulation, discussed home setup and modifications to facilitate safety and independence as patient demonstrating good progress. Pt completing AAROM with end-range passive stretch to bilateral shoulders. Rec: anticipate patient will be appropriate for discharge home given recent progress but will require 24/7 supervision/assist 2/2 cognitive deficits and activity tolerance. Pt reports someone will always be home between mother, spouse, and daughter-will need to confirm with family prior to discharge

## 2017-08-30 NOTE — PROGRESS NOTES
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  Now s/p ex lap with R hemicolectomy, end ileostomy, mucus fistula, partial omenectomy on 7/26.  Protracted recovery with recent concerns for infection, bowel perforation.      Type 2 diabetes:   Plan:  - glargine - discontinued for now.  If glucoses trending up may re-start.  -meal aspart 1unit/10g CHO for meals and snacks  -correction aspart: high intensity- frequency before meals and HS  -monitor glucose before meals and HS      Will continue to follow.                     Please notify the Diabetes Team with nutritional changes       Plan discussed with patient and RD           Interval History:   The last 24 hours progress and nursing notes reviewed.  Blood sugars within target , with no basal insulin.  Glucose 111 at ~ midnight  Glucose 95 at ~ 0330 and fasting 152  appetite is fair, does not like the food  Discussed with RD, nutritional supplement options. Mr. Bhagat voiced that he is unable to order certain carbohydrates ( he is on a potassium restricted diet)    When on TF, NPH 14 units, Nepro to 75 cc/h x 14 h [id number]    Recent Labs  Lab 08/30/17  0708 08/30/17  0329 08/30/17  0016 08/29/17  2248 08/29/17  1701 08/29/17  1456 08/29/17  1149 08/29/17  0547  08/28/17  0618  08/27/17  0630  08/26/17  0624  08/25/17  0540   *  --   --   --   --   --   --  103*  --  92  --  129*  --  163*  --  111*   BGM  --  95 111* 136* 117* 136* 113*  --   < >  --   < >  --   < >  --   < >  --    < > = values in this interval not displayed.            Review of Systems:   See interval hx          Medications:       Active Diet  Order      2 Gram K Diet     Physical Exam:  Gen: Alert, resting in bed, in NAD   HEENT: NC/AT, mucous membranes are moist  Resp: Unlabored  Ext: No lower extremity edema   Neuro:oriented person ( place and time not assessed), communicating clearly, mildly confused  /83 (BP Location: Left arm)  Pulse 84  Temp 98  F (36.7  C) (Oral)  Resp 14  Ht 1.829 m (6')  Wt 95.5 kg (210 lb 8.6 oz)  SpO2 93%  BMI 28.55 kg/m2           Data:     Lab Results   Component Value Date    A1C  07/06/2017     Canceled, Test credited   Below Assay Range  NOTIFIED LEONARD ONEILL AT 0538 ON 7/6/17 BY EAANTOLIN      A1C 9.4 02/16/2017    A1C 6.2 12/14/2016    A1C 6.1 10/27/2016    A1C 8.3 07/02/2012              CBC RESULTS:   Recent Labs   Lab Test  08/30/17   0708   WBC  2.7*   RBC  2.43*   HGB  7.4*   HCT  22.8*   MCV  94   MCH  30.5   MCHC  32.5   RDW  20.5*   PLT  68*     Recent Labs   Lab Test  08/30/17   0708  08/29/17   0547   NA  138  140   POTASSIUM  5.0  5.3   CHLORIDE  106  107   CO2  27  26   ANIONGAP  5  7   GLC  152*  103*   BUN  24  26   CR  1.03  1.07   JACOB  7.9*  8.1*     Liver Function Studies -   Recent Labs   Lab Test  08/28/17   0618   PROTTOTAL  6.5*   ALBUMIN  1.8*   BILITOTAL  0.6   ALKPHOS  251*   AST  44   ALT  19     Lab Results   Component Value Date    INR 1.12 08/24/2017    INR 1.19 08/05/2017    INR 1.31 07/26/2017    INR 1.31 07/26/2017    INR 1.30 07/26/2017    INR 1.23 07/25/2017    INR 1.11 07/24/2017    INR 1.24 07/21/2017    INR 1.19 07/17/2017    INR 1.17 07/10/2017    INR 1.22 07/08/2017    INR 1.80 07/06/2017       Alesha Sauer -[phone]  Diabetes Management job code [id number]

## 2017-08-30 NOTE — PLAN OF CARE
Problem: Goal Outcome Summary  Goal: Goal Outcome Summary  [id number]     VS VSS, afebrile on RA during the day      IV double lumen PICC, SL     Labs K+ 5.0, Hbg 7.4. BG q4h, 152 and 114, SSI and receiving carb coverage      Pain reporting back pain, oxycodone and tylenol PRN, given once. Pt requesting flexerilAda will order      Neuro A&Ox4, slightly forgetful but for the most part calling appropriately, very pleasant      Resp Sats dip to high 80s at night on 2L but RA during the day      Cardiovascular WNL     GI Ileostomy, 275cc semi-formed output, 24 hr goal for the day is 1000cc of stool, if > 1000cc, give PRN imodium       WNL, using urinal      Incision/Drain still has three drains, irrigating L Pigtail (small serosanguinous output, not measured this shift), and R drain connected to vazquez bag (55cc serious output), not irrigating R pigtail (small serosanguinous output, not measured this shift). Mucous fistula dressing changed per orders. Ileostomy needs to be changed, last change date unknown, WOCN no longer changing.      Mobility SBA w/ walker, doing well, walked three times this shift, longer distances (walking multiple units at a time). Does not like sitting in the chair, but does sit on the edge of the bed several times throughout the day.      Plan Planning to d/c home sometime next week. WOCN will come on Friday for education.

## 2017-08-30 NOTE — PROGRESS NOTES
Brief Hepatology Note    Chart reviewed. Patient seen. Having his staples removed. Brown stool in ostomy. Hemoglobin stable.     No plans to pursue capsule study at this point. Camacho may have had some oozing from his stoma, which is managed by wound ostomy team. If he clearly has GIB from a more proximal location in his small bowel, please alert GI fellow and we may pursue capsule study/push enteroscopy.     We will sign off. Discussed with primary team.     Updated Dr. Gonsalez.     Momo Zaragoza MD  GI Fellow

## 2017-08-30 NOTE — PLAN OF CARE
Problem: Goal Outcome Summary  Goal: Goal Outcome Summary  Outcome: Improving  VSS on 2L of O2/NC. Blood sugars 111 and 95. Denies need for pain intervention. Zofran given once for nausea. Tolerating a 2gm K+ diet with calorie counts. PICC to right arm saline locked. Pigtail drain on left abdomen put out 5mL of serosanguineous drainage. Pigtail drain on right abdomen put out 5mL of serosanguineous drainage. Drain hooked to vazquez bag on right abdomen put out 80mL of serous drainage. Ileostomy had 175mL out this shift. Voiding adequately on demand. Abdominal incision well approximated with steri strips, no drainage. Coccyx wound covered with mepilex. Ambulated in hallway once this shift with assist x1 and a walker. Alert and oriented x4. Able to voice needs.

## 2017-08-31 ENCOUNTER — APPOINTMENT (OUTPATIENT)
Dept: PHYSICAL THERAPY | Facility: CLINIC | Age: 52
End: 2017-08-31
Attending: TRANSPLANT SURGERY
Payer: COMMERCIAL

## 2017-08-31 ENCOUNTER — APPOINTMENT (OUTPATIENT)
Dept: OCCUPATIONAL THERAPY | Facility: CLINIC | Age: 52
End: 2017-08-31
Attending: TRANSPLANT SURGERY
Payer: COMMERCIAL

## 2017-08-31 ENCOUNTER — APPOINTMENT (OUTPATIENT)
Dept: CT IMAGING | Facility: CLINIC | Age: 52
End: 2017-08-31
Attending: PHYSICIAN ASSISTANT
Payer: COMMERCIAL

## 2017-08-31 LAB
ALBUMIN SERPL-MCNC: 1.8 G/DL (ref 3.4–5)
ALDOST SERPL-MCNC: <3 NG/DL
ALP SERPL-CCNC: 204 U/L (ref 40–150)
ALT SERPL W P-5'-P-CCNC: 14 U/L (ref 0–70)
ANION GAP SERPL CALCULATED.3IONS-SCNC: 6 MMOL/L (ref 3–14)
ANISOCYTOSIS BLD QL SMEAR: ABNORMAL
AST SERPL W P-5'-P-CCNC: 31 U/L (ref 0–45)
BACTERIA SPEC CULT: ABNORMAL
BACTERIA SPEC CULT: ABNORMAL
BASOPHILS # BLD AUTO: 0 10E9/L (ref 0–0.2)
BASOPHILS NFR BLD AUTO: 0.9 %
BILIRUB DIRECT SERPL-MCNC: 0.2 MG/DL (ref 0–0.2)
BILIRUB SERPL-MCNC: 0.5 MG/DL (ref 0.2–1.3)
BUN SERPL-MCNC: 24 MG/DL (ref 7–30)
CALCIUM SERPL-MCNC: 7.9 MG/DL (ref 8.5–10.1)
CHLORIDE SERPL-SCNC: 104 MMOL/L (ref 94–109)
CO2 SERPL-SCNC: 28 MMOL/L (ref 20–32)
CREAT SERPL-MCNC: 1.03 MG/DL (ref 0.66–1.25)
DIFFERENTIAL METHOD BLD: ABNORMAL
EOSINOPHIL # BLD AUTO: 0 10E9/L (ref 0–0.7)
EOSINOPHIL NFR BLD AUTO: 0 %
ERYTHROCYTE [DISTWIDTH] IN BLOOD BY AUTOMATED COUNT: 20.8 % (ref 10–15)
GFR SERPL CREATININE-BSD FRML MDRD: 76 ML/MIN/1.7M2
GLUCOSE BLDC GLUCOMTR-MCNC: 110 MG/DL (ref 70–99)
GLUCOSE BLDC GLUCOMTR-MCNC: 127 MG/DL (ref 70–99)
GLUCOSE BLDC GLUCOMTR-MCNC: 139 MG/DL (ref 70–99)
GLUCOSE BLDC GLUCOMTR-MCNC: 145 MG/DL (ref 70–99)
GLUCOSE BLDC GLUCOMTR-MCNC: 199 MG/DL (ref 70–99)
GLUCOSE SERPL-MCNC: 132 MG/DL (ref 70–99)
HCT VFR BLD AUTO: 22.7 % (ref 40–53)
HGB BLD-MCNC: 7.4 G/DL (ref 13.3–17.7)
LYMPHOCYTES # BLD AUTO: 0.4 10E9/L (ref 0.8–5.3)
LYMPHOCYTES NFR BLD AUTO: 14.2 %
MAGNESIUM SERPL-MCNC: 1.8 MG/DL (ref 1.6–2.3)
MCH RBC QN AUTO: 30.6 PG (ref 26.5–33)
MCHC RBC AUTO-ENTMCNC: 32.6 G/DL (ref 31.5–36.5)
MCV RBC AUTO: 94 FL (ref 78–100)
METAMYELOCYTES # BLD: 0.1 10E9/L
METAMYELOCYTES NFR BLD MANUAL: 1.8 %
MICROCYTES BLD QL SMEAR: PRESENT
MONOCYTES # BLD AUTO: 0.5 10E9/L (ref 0–1.3)
MONOCYTES NFR BLD AUTO: 15.9 %
NEUTROPHILS # BLD AUTO: 1.9 10E9/L (ref 1.6–8.3)
NEUTROPHILS NFR BLD AUTO: 67.2 %
PF4 HEPARIN CMPLX AB SER QL: NEGATIVE
PHOSPHATE SERPL-MCNC: 4.8 MG/DL (ref 2.5–4.5)
PLATELET # BLD AUTO: 72 10E9/L (ref 150–450)
PLATELET # BLD EST: ABNORMAL 10*3/UL
POTASSIUM SERPL-SCNC: 4.8 MMOL/L (ref 3.4–5.3)
PROT SERPL-MCNC: 6.3 G/DL (ref 6.8–8.8)
RBC # BLD AUTO: 2.42 10E12/L (ref 4.4–5.9)
RENIN PLAS-CCNC: 1.1 NG/ML/HR
SODIUM SERPL-SCNC: 138 MMOL/L (ref 133–144)
SPECIMEN SOURCE: ABNORMAL
TACROLIMUS BLD-MCNC: 8.4 UG/L (ref 5–15)
TME LAST DOSE: NORMAL H
WBC # BLD AUTO: 2.9 10E9/L (ref 4–11)

## 2017-08-31 PROCEDURE — 25000132 ZZH RX MED GY IP 250 OP 250 PS 637: Performed by: TRANSPLANT SURGERY

## 2017-08-31 PROCEDURE — 85025 COMPLETE CBC W/AUTO DIFF WBC: CPT | Performed by: STUDENT IN AN ORGANIZED HEALTH CARE EDUCATION/TRAINING PROGRAM

## 2017-08-31 PROCEDURE — 40000133 ZZH STATISTIC OT WARD VISIT: Performed by: OCCUPATIONAL THERAPIST

## 2017-08-31 PROCEDURE — 40000802 ZZH SITE CHECK

## 2017-08-31 PROCEDURE — 36592 COLLECT BLOOD FROM PICC: CPT | Performed by: STUDENT IN AN ORGANIZED HEALTH CARE EDUCATION/TRAINING PROGRAM

## 2017-08-31 PROCEDURE — 40000193 ZZH STATISTIC PT WARD VISIT

## 2017-08-31 PROCEDURE — 97535 SELF CARE MNGMENT TRAINING: CPT | Mod: GO | Performed by: OCCUPATIONAL THERAPIST

## 2017-08-31 PROCEDURE — 25000132 ZZH RX MED GY IP 250 OP 250 PS 637

## 2017-08-31 PROCEDURE — 36415 COLL VENOUS BLD VENIPUNCTURE: CPT | Performed by: STUDENT IN AN ORGANIZED HEALTH CARE EDUCATION/TRAINING PROGRAM

## 2017-08-31 PROCEDURE — 00000146 ZZHCL STATISTIC GLUCOSE BY METER IP

## 2017-08-31 PROCEDURE — 12000026 ZZH R&B TRANSPLANT

## 2017-08-31 PROCEDURE — 80197 ASSAY OF TACROLIMUS: CPT | Performed by: STUDENT IN AN ORGANIZED HEALTH CARE EDUCATION/TRAINING PROGRAM

## 2017-08-31 PROCEDURE — 25000132 ZZH RX MED GY IP 250 OP 250 PS 637: Performed by: PHYSICIAN ASSISTANT

## 2017-08-31 PROCEDURE — 97530 THERAPEUTIC ACTIVITIES: CPT | Mod: GP

## 2017-08-31 PROCEDURE — 97116 GAIT TRAINING THERAPY: CPT | Mod: GP

## 2017-08-31 PROCEDURE — 80048 BASIC METABOLIC PNL TOTAL CA: CPT | Performed by: STUDENT IN AN ORGANIZED HEALTH CARE EDUCATION/TRAINING PROGRAM

## 2017-08-31 PROCEDURE — 74176 CT ABD & PELVIS W/O CONTRAST: CPT

## 2017-08-31 PROCEDURE — 83735 ASSAY OF MAGNESIUM: CPT | Performed by: STUDENT IN AN ORGANIZED HEALTH CARE EDUCATION/TRAINING PROGRAM

## 2017-08-31 PROCEDURE — 25000131 ZZH RX MED GY IP 250 OP 636 PS 637: Performed by: PHYSICIAN ASSISTANT

## 2017-08-31 PROCEDURE — 80076 HEPATIC FUNCTION PANEL: CPT | Performed by: STUDENT IN AN ORGANIZED HEALTH CARE EDUCATION/TRAINING PROGRAM

## 2017-08-31 PROCEDURE — 84100 ASSAY OF PHOSPHORUS: CPT | Performed by: STUDENT IN AN ORGANIZED HEALTH CARE EDUCATION/TRAINING PROGRAM

## 2017-08-31 RX ORDER — FUROSEMIDE 20 MG
20 TABLET ORAL DAILY
Status: DISCONTINUED | OUTPATIENT
Start: 2017-08-31 | End: 2017-09-08

## 2017-08-31 RX ORDER — CYCLOBENZAPRINE HCL 10 MG
10 TABLET ORAL 3 TIMES DAILY PRN
Status: DISCONTINUED | OUTPATIENT
Start: 2017-08-31 | End: 2017-08-31

## 2017-08-31 RX ORDER — LIDOCAINE 50 MG/G
1 PATCH TOPICAL
Status: DISCONTINUED | OUTPATIENT
Start: 2017-08-31 | End: 2017-09-08 | Stop reason: HOSPADM

## 2017-08-31 RX ORDER — OXYCODONE HYDROCHLORIDE 5 MG/1
5-10 TABLET ORAL 2 TIMES DAILY PRN
Status: DISCONTINUED | OUTPATIENT
Start: 2017-08-31 | End: 2017-09-08

## 2017-08-31 RX ORDER — LOPERAMIDE HCL 2 MG
4 CAPSULE ORAL DAILY
Status: DISCONTINUED | OUTPATIENT
Start: 2017-08-31 | End: 2017-09-01

## 2017-08-31 RX ORDER — CYCLOBENZAPRINE HCL 10 MG
10 TABLET ORAL 3 TIMES DAILY PRN
Status: DISCONTINUED | OUTPATIENT
Start: 2017-08-31 | End: 2017-09-08 | Stop reason: HOSPADM

## 2017-08-31 RX ORDER — SODIUM BICARBONATE 650 MG/1
650 TABLET ORAL 3 TIMES DAILY
Status: DISCONTINUED | OUTPATIENT
Start: 2017-08-31 | End: 2017-09-08 | Stop reason: HOSPADM

## 2017-08-31 RX ADMIN — LIDOCAINE 1 PATCH: 50 PATCH CUTANEOUS at 03:41

## 2017-08-31 RX ADMIN — AMOXICILLIN AND CLAVULANATE POTASSIUM 1 TABLET: 875; 125 TABLET, FILM COATED ORAL at 09:00

## 2017-08-31 RX ADMIN — FLUDROCORTISONE ACETATE 0.1 MG: 0.1 TABLET ORAL at 08:59

## 2017-08-31 RX ADMIN — SODIUM BICARBONATE 650 MG TABLET 650 MG: at 14:26

## 2017-08-31 RX ADMIN — CYCLOBENZAPRINE HYDROCHLORIDE 10 MG: 10 TABLET, FILM COATED ORAL at 20:11

## 2017-08-31 RX ADMIN — HYDROCHLOROTHIAZIDE 25 MG: 25 TABLET ORAL at 09:00

## 2017-08-31 RX ADMIN — Medication 15 ML: at 09:00

## 2017-08-31 RX ADMIN — LOPERAMIDE HYDROCHLORIDE 4 MG: 2 CAPSULE ORAL at 14:26

## 2017-08-31 RX ADMIN — CYCLOBENZAPRINE HYDROCHLORIDE 10 MG: 10 TABLET, FILM COATED ORAL at 05:45

## 2017-08-31 RX ADMIN — OXYCODONE HYDROCHLORIDE 5 MG: 5 TABLET ORAL at 09:21

## 2017-08-31 RX ADMIN — FUROSEMIDE 20 MG: 20 TABLET ORAL at 14:26

## 2017-08-31 RX ADMIN — TACROLIMUS 1.5 MG: 1 CAPSULE ORAL at 18:26

## 2017-08-31 RX ADMIN — Medication 15 ML: at 15:58

## 2017-08-31 RX ADMIN — AMOXICILLIN AND CLAVULANATE POTASSIUM 1 TABLET: 875; 125 TABLET, FILM COATED ORAL at 20:11

## 2017-08-31 RX ADMIN — SODIUM BICARBONATE 650 MG TABLET 650 MG: at 20:11

## 2017-08-31 RX ADMIN — TACROLIMUS 1.5 MG: 1 CAPSULE ORAL at 08:59

## 2017-08-31 RX ADMIN — PANTOPRAZOLE SODIUM 40 MG: 20 TABLET, DELAYED RELEASE ORAL at 09:00

## 2017-08-31 RX ADMIN — MICONAZOLE NITRATE: 20 CREAM TOPICAL at 20:11

## 2017-08-31 RX ADMIN — Medication 15 ML: at 20:11

## 2017-08-31 RX ADMIN — INSULIN ASPART 1 UNITS: 100 INJECTION, SOLUTION INTRAVENOUS; SUBCUTANEOUS at 16:05

## 2017-08-31 RX ADMIN — SODIUM BICARBONATE 650 MG TABLET 650 MG: at 08:59

## 2017-08-31 RX ADMIN — Medication 15 ML: at 14:26

## 2017-08-31 RX ADMIN — AMLODIPINE BESYLATE 10 MG: 10 TABLET ORAL at 09:00

## 2017-08-31 NOTE — PROGRESS NOTES
Calorie Counts  Intake recorded for: 8/30 Kcals: 2553  Protein: 91g  # Meals Recorded: 100% 12 oz apple juice, pineapple, scrambled eggs, sausage eliane, corn muffin, 4 oz whole milk, pepperoni and olive pizza from [employer], 75% apple slices   # Supplements Recorded: 100% 3 Ensure Plus

## 2017-08-31 NOTE — PLAN OF CARE
Problem: Goal Outcome Summary  Goal: Goal Outcome Summary  Outcome: No Change  Afebrile, B/P 140s-150s/80s-90s, pt's sats 88% on RA while sleeping, 2 L 02 applied overnight.  at 0200. Pt alert, occasionally disoriented to time but easily reoriented. BG Pt c/o back spasms, oxy, tylenol, flexeril, and lidocaine patches administered. Pt had the most relief from flexeril. R PIC saline locked. Pt on a 2 gm sodium diet, appetite good. Voiding adequate amounts. 300 out from illeostomy. 55 serosanguinous fluid out from R retroperitonea drain (20 cc irrigant subtracted), 10 out from R pigtail drain. 20 out from L pigtail drain (10 cc irrigant subtracted). Up with assist of 1 and walker.

## 2017-08-31 NOTE — PLAN OF CARE
Problem: Goal Outcome Summary  Goal: Goal Outcome Summary  Outcome: No Change  /76  Pulse 83  Temp 98.8  F (37.1  C) (Oral)  Resp 16  Ht 1.829 m (6')  Wt 94.4 kg (208 lb 3.2 oz)  SpO2 91%  BMI 28.24 kg/m2     Patient VSS on RA;a febrile.  and 113, no insulin needed. Denies pain. Tolerating low K diet; ate 100% of dinner his daughter brought. DL PICC SL. Voiding via urinal. Ileostomy intact with moderate amount of loose brown stool. Drains irrigated, dressings CDI. Up with SBA and walker. Intermittently confused about time later in the evening. Plan for possible drain pull tomorrow. Continue with POC and notify MD with changes or concerns.

## 2017-08-31 NOTE — PLAN OF CARE
Problem: Goal Outcome Summary  Goal: Goal Outcome Summary  PT 7A: Pt tolerated session very well. Ambulated 500' total over 3 bouts and negotiated up/down 16 steps with bilateral rails. Fatigued and in pain but demonstrating good indep with 4WW. Recommend pt discharge home with assist and home PT.

## 2017-08-31 NOTE — PLAN OF CARE
"Problem: Goal Outcome Summary  Goal: Goal Outcome Summary  [id number]     VS VSS, O2 sats are borderline low requiring oxygen on and off, mostly with sleep      IV double lumen PICC, SL     Labs K+ 4.8, Hbg 7.4, BG AC & HS, 132 and 127     Pain reporting back pain, oxycodone given x1, lidoderm patch in place, massage and encouraging frequent repositioning. Flexeril given earlier this morning, available if he wants it      Neuro A&Ox3, not always orientated to time, forgetful, talking in his sleep and takes a little time to wake up      Resp RLL fine crackles, L side clear, MD notified, will start PO lasix, encouraging sitting up in the chair and ambulating      Cardiovascular WNL     GI ileostomy bag changed, 375mL of orange/brown loose/ liquid stool out this shift (semi-formed in the morning, liquid in the afternoon), no evidence of bleeding, scheduled imodium started once a day. PO intake not as strong today as last since pt had to be NPO most of the morning for abdominal CT. Magen counts in effect.       WNL     Incision/Drain Per MD, pt will d/c with all three drains. Pt will need education on how to flush all three drains. Spoke with primary team for them to clarify orders that all drains must be flushed. Sacral wound dressing changed per orders, ~ 0.5\" deep wound, white/yellow slough, erythema surrounding.     Mobility Ax1, walking the halls, feels like he overdid it yesterday and is sore.      Plan Planning to d/c next week. Will need continuing education. WOCN to see tomorrow for ostomy education.                 "

## 2017-08-31 NOTE — PROGRESS NOTES
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  Now s/p ex lap with R hemicolectomy, end ileostomy, mucus fistula, partial omenectomy on 7/26.  Protracted recovery with recent concerns for infection, bowel perforation.      Type 2 diabetes:   Plan:  - glargine - discontinued for now.  If glucoses trending up may re-start.  -meal aspart 1unit/10g CHO for meals and snacks  -correction aspart: high intensity- frequency before meals and HS  -monitor glucose before meals and HS      Will continue to follow.                     Please notify the Diabetes Team with nutritional changes      Diabetes team will sign-off, dicussed with Ada Driver PA-C, please contact via job code pager [phone] for questions.           Interval History:   The last 24 hours progress and nursing notes reviewed.  Blood sugars in good control, < 200 and range 110-165  No hypoglycemia  Appetite is improving, denies nausea and or vomiting        Recent Labs  Lab 08/31/17  1220 08/31/17  0825 08/31/17  0545 08/31/17  0224 08/30/17  2231 08/30/17  1929 08/30/17  1824  08/30/17  0708  08/29/17  0547  08/28/17  0618  08/27/17  0630  08/26/17  0624   GLC  --   --  132*  --   --   --   --   --  152*  --  103*  --  92  --  129*  --  163*   * 110*  --  139* 113* 113* 165*  < >  --   < >  --   < >  --   < >  --   < >  --    < > = values in this interval not displayed.            Review of Systems:   See interval hx          Medications:       Active Diet Order      2 Gram K Diet     Physical Exam:  Gen: Alert, resting in bed, in NAD   HEENT: NC/AT, mucous membranes are moist  Resp:  Unlabored  Neuro:oriented x3, communicating clearly  BP (!) 144/91  Pulse 83  Temp 98.4  F (36.9  C)  Resp 20  Ht 1.829 m (6')  Wt 95.3 kg (210 lb 1.6 oz)  SpO2 98%  BMI 28.49 kg/m2           Data:     Lab Results   Component Value Date    A1C  07/06/2017     Canceled, Test credited   Below Assay Range  NOTIFIED LEONARD ONEILL AT 0538 ON 7/6/17 BY CHRISSY      A1C 9.4 02/16/2017    A1C 6.2 12/14/2016    A1C 6.1 10/27/2016    A1C 8.3 07/02/2012              CBC RESULTS:   Recent Labs   Lab Test  08/31/17   0545   WBC  2.9*   RBC  2.42*   HGB  7.4*   HCT  22.7*   MCV  94   MCH  30.6   MCHC  32.6   RDW  20.8*   PLT  72*     Recent Labs   Lab Test  08/31/17   0545  08/30/17   0708   NA  138  138   POTASSIUM  4.8  5.0   CHLORIDE  104  106   CO2  28  27   ANIONGAP  6  5   GLC  132*  152*   BUN  24  24   CR  1.03  1.03   JACOB  7.9*  7.9*     Liver Function Studies -   Recent Labs   Lab Test  08/31/17   0545   PROTTOTAL  6.3*   ALBUMIN  1.8*   BILITOTAL  0.5   ALKPHOS  204*   AST  31   ALT  14     Lab Results   Component Value Date    INR 1.12 08/24/2017    INR 1.19 08/05/2017    INR 1.31 07/26/2017    INR 1.31 07/26/2017    INR 1.30 07/26/2017    INR 1.23 07/25/2017    INR 1.11 07/24/2017    INR 1.24 07/21/2017    INR 1.19 07/17/2017    INR 1.17 07/10/2017    INR 1.22 07/08/2017    INR 1.80 07/06/2017         Alesha Sauer [phone]  Diabetes Management job code [id number]

## 2017-08-31 NOTE — PLAN OF CARE
Problem: Goal Outcome Summary  Goal: Goal Outcome Summary  OT/7A: Pt completed standing ADLs with min A. Pt ambulate with SBA. Pt limiited by activity tolerance.      REC: home with 24 hour assist for safety, cognitive deficits- pt will need assist with med management, transportation, meal prep and heavy house hold chores

## 2017-08-31 NOTE — PROGRESS NOTES
Immunosuppression Note:    Camacho Bhagat is a 51 year old male who is seen today  for immunosuppression management     I, Jovan Tran MD, I have examined the patient with the resident/PA/Fellow, discussed and agree with the note and findings.  I have reviewed today's vital signs, medications, labs and imaging. I reviewed the immunosuppression medications and levels. I spoke to the patient/family and explained below clinical details and answered all the questions      Transplant Surgery  Inpatient Daily Progress Note  08/31/2017    Assessment & Plan: Camacho Bhagat is a 52 year old male with history of CASTAÑEDA cirrhosis s/p liver transplant on 3/4/17. Admitted 7/4/17 from [facility] ED with sepsis secondary to colitis, taken to OR for initial ex-lap with findings of typhlitis in the right colon, wound left open with wound vac in place for re exploration and interval closure on 7/5/17. Concern for CMV colitis with low count CMV viremia, underwent colonoscopy on 7/21/17, biopsies obtained.  On 7/25/17 patient became septic with abdominal compartment syndrome.  CT noted new large heterogeneous right sided retroperitoneal gas and air tracking along duodenum, and patient underwent an ex-lap, right angelique-colectomy, end ileostomy, mucus fistula, partial omentectomy on 7/26/17 by colorectal surgery.  Post-op course complicated by retroperitoneal abscess/peritonitis requiring percutaneous drainage.    Graft Function: Good function. LFTs acceptable (checking every M, Th).   Immunosuppression Management:   CellCept discontinued (6/27/17) due to neutropenia.   Prograf goal, resume goal 6-8. 8/29 level 8.4, 11.5.  Continue 1.5 mg BID. Check level tomorrow.  Cardiorespiratory:   -Suspected RONNIE: Uses O2 overnight only for brief apnea while sleeping per nursing.  -HTN: SBP 120s-160s. Continue amlodipine 10 mg daily. Added HCTZ 25 mg daily for HTN and hyperkalemia. Pt on daily Florinef daily for hyperkalemia.   -R/o HCAP:  New  productive cough on 8/24, no fever, improved.  CXR: retrocardiac opacity.  Already on Augmentin.  MRSA swab negative.  Duonebs and mycomust x 2 days, completed.  Out of bed. Aggressive pulmonary toilet.  Hematology: Pancytopenia present on admission, persists.  Contributing factors include medications and CMV infection.  -Anemia- Hemoglobin 7.4 (7.6). Dropped below 7 8/26 with bloody ileostomy output, scope completed, see below. Blood transfusion on 8/26 (2 units). PLT trending down. Possible due to recent bleed. Med list reviewed. Send HIT assay. ASA for HAT ppx, will stop as pt ~ 6 months. Will not restart Hep ppx.   -Leukopenia/neutropenia- WBC 2.7, ANC > 800.  Received GCSF 8/20, 8/22-25.  -Thrombocytopenia-  Plt 76 trending down. Possible due to recent bleeding. Previously dropped with linezolid.  GI:   -Neutropenic colitis on admission. Colonoscopy 7/21. Biopsy: resolving injury secondary to possible drug induced vs infectious.    -Bowel perforation: 7/25 CT scan abd/pelvis-new large heterogeneous right sided retroperitoneal gas and air tracking along duodenum.  No contrast extravasation.  No intraperitoneal air noted. Colorectal surgery performed Ex lap, right angelique-colectomy, end ileostomy, mucus fistula, partial omentectomy on 7/26. Findings no neida perforation, phlegmon/inflammation right colon. Colon pathology suggested colon perforation, negative for malignancy; CMV stain positive.    -Retroperitoneal abscess/ peritonitis: See ID section below.  -Diet:  NJ tube was dislodged, hold off on replacement for a few days per Dr. Tran to monitor patient's intake.  Low K diet with aspiration precautions.  Magen counts.  -High output  ileostomy:  Goal ileostomy output ~ 1200 cc in 24 hrs. Ileostomy output sanguinous liquid with clots 8/26. Appreciate GI recs.  Ileoscopy and EGD 8/27 showed no active bleeding or ulceration, per GI bleeding suspected from ileostomy.  No plan for capsule endoscopy at this time.  Ostomy output brown with no signs of bleeding today. Monitor.  Ostomy output decreased with stopping tube feeding. Change Loperamide, lomotiland fiber discontinued. Ostomy output 925 cc yesterday. Will restart Loperamide once daily.   Fluid/Electrolytes/Renal:   -EJ: on admission, d/t ATN sec to sepsis. Now resolved, Cr 1.  -Hypernatremia: Resolved with free water.    -Hyperkalemia: Neph consulted earlier in hospitalization. 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  On daily bicarb, PRN lasix, florinef, low K diet. Reconsulted nephrology. Recommended HCTZ for HTN and hyperkalemia. Continue Florinef 0.1 mg and sodium bicarbonate. K 4.8. Consider switching HCTZ to lasix if K increased.   Endocrine: DM type 2. Endocrine consulting for glycemic management.  Glargine and NPH stopped as tube feeding stopped. Continue SSI QID and carb coverage with meals.   Infectious disease: Afebrile.  WBC 2.7.   -Retroperitoneal abscess/peritonitis: CT C/A/P 8/9 showed a persistent gas/fluid collection RLQ, peritonitis, some free air but no evidence of contrast extravasation.  8/9 retroperitoneal drain placed, cx + clostridium septicum.  8/11 paracentesis: WBC 5,038, drain cultures + clostridium septicum (day 6, broth only).  8/14 CT scan abd/pelvis: Complex gas-containing fluid collection in the right retroperitoneum decreased slightly in size, moderate ascites with intraperitoneal gas, peritonitis noted, no bowel leak.  8/15 peritoneal drain placement: WBC 15,680, culture negative.  8/23 CT:  Fluid collections slightly larger.  Patient clinically worse with increased AMS.  8/24 IR replaced retroperitoneal drain, placed new 12F LLQ drain.  Patient improved clinically after drains placed.   8/25 fluid culture from LLQ drain, budding yeast, pt doing well. Will monitor. Repeat imaging today to evaluate fluid collections. Plan to stop Augmentin next week (4 weeks on 9/6).   Abx:  zosyn (8/9-8/22), linezolid (8/9-8/15), switched to Augmentin (8/22-present).    -CMV viremia: Primary CMV infection.  (CMV R-D+) CMV colitis per pathology, peak serum 7/15 4225 IU/ml.   IV Ganciclovir (started 7/20).  CMV count fell to <137. Decreased Ganciclovir to 5mg/kg on 8/18.  Switched to valcyte 900mg daily 8/22, continue until 8/26.  Check CMV quant weekly until 9/24 and then every other week for 2 months. Last check 8/24 CMV not detected. Check next 8/31.  -EBV viremia:  Peak serum 406,697 copies/ml on 7/18.   753 copies/ml on 8/15.  Check quant monthly, due 9/15.  -Cellulitis/dry gangrene:  Right 1st finger, on linezolid (8/19- 8/22), switched to doxycycline x7 days (8/22-8/28). Clinically resolved.  -Reference ID note from 8/28 for final recs.  Infectious disease resolved issues:  -Severe sepsis: On admission, secondary to right colon phlegmon. Meropenem/daptomycin/micafungin tx x 10 days completed on 8/3. S/p right angelique-colectomy.  Path: CMV stain +, perforation of colon noted.   Neuro: Toxic metabolic encephalopathy secondary to infection, prolonged hospital stay.  Improved.  Vascular:  Microvascular ischemic injury in digits (toes, finger) this is most likely due to injury while patient was on pressor therapy with sepsis. Erythema right 1st finger documented and marked, now resolved.   Activity: Deconditioned due to prolong hospital course. Daily PT/OT, encourage pt to be OOB to chair at least TID (patient prefers up to side of bed).   Prophylaxis: DVT-Heparin SQ, hold today due to bleeding.  Disposition: 7A. Plan to discharge to home. Continue with daily PT/OT/OOB as much as possible. Possible d/c early next week.    Medical Decision Making: Medium    PILLO/Fellow/Resident Provider: Ada Driver PA-C [id number]    Faculty: Jovan Tran MD     __________________________________________________________________  Interval History:   Overnight events:   Good intake yesterday. Left side abdominal discomfort unchanged. Ambulating with walker. Looking forward to home.       ROS:   A  10-point review of systems was negative except as noted above.    Meds:    lidocaine  1 patch Transdermal Q24h    And     [START ON 9/1/2017] lidocaine   Transdermal Q24H    And     lidocaine   Transdermal Q8H     sodium bicarbonate  650 mg Per NG tube TID     tacrolimus  1.5 mg Oral BID IS     hydrochlorothiazide  25 mg Oral Daily     insulin aspart  1-10 Units Subcutaneous TID AC     insulin aspart  1-7 Units Subcutaneous At Bedtime     amoxicillin-clavulanate  1 tablet Oral Q12H FRANCO     amLODIPine  10 mg Oral Daily     pantoprazole  40 mg Oral Daily     menthol   Transdermal Q8H     insulin aspart   Subcutaneous TID w/meals     artificial saliva  15 mL Swish & Spit 4x Daily     fludrocortisone  0.1 mg Oral Daily     sodium chloride (PF)  10 mL Irrigation Q8H     sodium chloride (PF)  20 mL Irrigation Q6H     miconazole with skin protectant   Topical BID     sodium chloride (PF)  3 mL Intracatheter Q8H       Physical Exam:     Admit Weight: 94.2 kg (207 lb 11.2 oz) (SCALE 2)    Current vitals:   /84  Pulse 83  Temp 98.2  F (36.8  C)  Resp 18  Ht 1.829 m (6')  Wt 94.4 kg (208 lb 3.2 oz)  SpO2 99%  BMI 28.24 kg/m2    Vital sign ranges:    Temp:  [97.8  F (36.6  C)-98.8  F (37.1  C)] 98.2  F (36.8  C)  Pulse:  [80-88] 83  Heart Rate:  [81-88] 85  Resp:  [16-18] 18  BP: (134-153)/(72-92) 149/84  SpO2:  [88 %-99 %] 99 %  Patient Vitals for the past 24 hrs:   BP Temp Temp src Pulse Heart Rate Resp SpO2 Weight   08/31/17 0800 149/84 98.2  F (36.8  C) - - 85 18 99 % -   08/31/17 0350 153/86 97.8  F (36.6  C) Oral - 81 18 98 % -   08/30/17 2347 - - - - - - 97 % -   08/30/17 2344 (!) 148/92 98.1  F (36.7  C) Oral - 85 18 (!) 88 % -   08/30/17 1931 134/76 98.8  F (37.1  C) Oral 83 83 16 91 % -   08/30/17 1549 137/72 98.3  F (36.8  C) Oral 88 88 16 94 % -   08/30/17 1210 140/82 98.1  F (36.7  C) - 80 - 16 95 % 94.4 kg (208 lb 3.2 oz)     General Appearance: NAD  Skin: normal, warm, dry  Heart: RRR  Lungs:  Nonlabored resps  Abdomen: soft, rounded, mild tender LQ unchanged. Ileostomy liquid slightly reddish brown no signs of bleeding from stoma.  Mucus fistula covered. Midline incision, c/d/i.  Right abdominal drains: both serosang.  LLQ drain: serosang output  : No vazquez  Extremities: No edema. R index finger with necrotic blacked finger.  Necrotic blackened areas on right 1st & 2nd toes and left 2nd toe.  Neurologic: A&O x4. No tremor      Data:   CMP    Recent Labs  Lab 08/31/17  0545 08/30/17  0708  08/28/17  0618    138  < > 140   POTASSIUM 4.8 5.0  < > 5.5*   CHLORIDE 104 106  < > 108   CO2 28 27  < > 25   * 152*  < > 92   BUN 24 24  < > 29   CR 1.03 1.03  < > 0.97   GFRESTIMATED 76 76  < > 81   GFRESTBLACK >90 >90  < > >90   JACOB 7.9* 7.9*  < > 8.1*   MAG 1.8 1.9  < > 1.7   PHOS 4.8* 4.5  < > 4.2   ALBUMIN 1.8*  --   --  1.8*   BILITOTAL 0.5  --   --  0.6   ALKPHOS 204*  --   --  251*   AST 31  --   --  44   ALT 14  --   --  19   < > = values in this interval not displayed.  CBC    Recent Labs  Lab 08/31/17  0545 08/30/17  0708   HGB 7.4* 7.4*   WBC 2.9* 2.7*   PLT 72* 68*     COAGS    Recent Labs  Lab 08/24/17  1406   INR 1.12      Urinalysis  Recent Labs   Lab Test  08/10/17   1752  08/08/17   1600   06/14/17   1508   04/11/16   1345   COLOR  Yellow  Yellow   < >   --    < >  Yellow   APPEARANCE  Slightly Cloudy  Slightly Cloudy   < >   --    < >  Clear   URINEGLC  Negative  Negative   < >   --    < >  30*   URINEBILI  Small   This is an unconfirmed screening test result. A positive result may be false.  *  Negative   < >   --    < >  Negative   URINEKETONE  Negative  Negative   < >   --    < >  Negative   SG  1.012  1.010   < >   --    < >  1.016   UBLD  Negative  Negative   < >   --    < >  Small*   URINEPH  5.0  5.0   < >   --    < >  5.0   PROTEIN  30*  30*   < >   --    < >  30*   NITRITE  Negative  Negative   < >   --    < >  Negative   LEUKEST  Negative  Negative   < >   --    < >   Negative   RBCU  0  3*   < >   --    < >  1   WBCU  10*  7*   < >   --    < >  1   UTPG   --    --    --   1.55*   --   0.41*    < > = values in this interval not displayed.

## 2017-09-01 ENCOUNTER — APPOINTMENT (OUTPATIENT)
Dept: OCCUPATIONAL THERAPY | Facility: CLINIC | Age: 52
End: 2017-09-01
Attending: TRANSPLANT SURGERY
Payer: COMMERCIAL

## 2017-09-01 ENCOUNTER — APPOINTMENT (OUTPATIENT)
Dept: PHYSICAL THERAPY | Facility: CLINIC | Age: 52
End: 2017-09-01
Attending: TRANSPLANT SURGERY
Payer: COMMERCIAL

## 2017-09-01 DIAGNOSIS — R80.9 PROTEINURIA: Primary | ICD-10-CM

## 2017-09-01 LAB
ANION GAP SERPL CALCULATED.3IONS-SCNC: 7 MMOL/L (ref 3–14)
ANISOCYTOSIS BLD QL SMEAR: SLIGHT
BASOPHILS # BLD AUTO: 0 10E9/L (ref 0–0.2)
BASOPHILS NFR BLD AUTO: 0 %
BUN SERPL-MCNC: 22 MG/DL (ref 7–30)
CALCIUM SERPL-MCNC: 8.1 MG/DL (ref 8.5–10.1)
CHLORIDE SERPL-SCNC: 102 MMOL/L (ref 94–109)
CMV DNA SPEC NAA+PROBE-ACNC: NORMAL [IU]/ML
CMV DNA SPEC NAA+PROBE-LOG#: NORMAL {LOG_IU}/ML
CO2 SERPL-SCNC: 26 MMOL/L (ref 20–32)
CREAT SERPL-MCNC: 1.02 MG/DL (ref 0.66–1.25)
DIFFERENTIAL METHOD BLD: ABNORMAL
EOSINOPHIL # BLD AUTO: 0 10E9/L (ref 0–0.7)
EOSINOPHIL NFR BLD AUTO: 0 %
ERYTHROCYTE [DISTWIDTH] IN BLOOD BY AUTOMATED COUNT: 20.8 % (ref 10–15)
GFR SERPL CREATININE-BSD FRML MDRD: 76 ML/MIN/1.7M2
GLUCOSE BLDC GLUCOMTR-MCNC: 105 MG/DL (ref 70–99)
GLUCOSE BLDC GLUCOMTR-MCNC: 122 MG/DL (ref 70–99)
GLUCOSE BLDC GLUCOMTR-MCNC: 143 MG/DL (ref 70–99)
GLUCOSE BLDC GLUCOMTR-MCNC: 176 MG/DL (ref 70–99)
GLUCOSE BLDC GLUCOMTR-MCNC: 199 MG/DL (ref 70–99)
GLUCOSE BLDC GLUCOMTR-MCNC: 88 MG/DL (ref 70–99)
GLUCOSE SERPL-MCNC: 110 MG/DL (ref 70–99)
HCT VFR BLD AUTO: 21.3 % (ref 40–53)
HGB BLD-MCNC: 7 G/DL (ref 13.3–17.7)
LYMPHOCYTES # BLD AUTO: 0.6 10E9/L (ref 0.8–5.3)
LYMPHOCYTES NFR BLD AUTO: 19.5 %
MAGNESIUM SERPL-MCNC: 1.7 MG/DL (ref 1.6–2.3)
MCH RBC QN AUTO: 30.6 PG (ref 26.5–33)
MCHC RBC AUTO-ENTMCNC: 32.9 G/DL (ref 31.5–36.5)
MCV RBC AUTO: 93 FL (ref 78–100)
MONOCYTES # BLD AUTO: 0.3 10E9/L (ref 0–1.3)
MONOCYTES NFR BLD AUTO: 9.7 %
NEUTROPHILS # BLD AUTO: 2.1 10E9/L (ref 1.6–8.3)
NEUTROPHILS NFR BLD AUTO: 70.8 %
PHOSPHATE SERPL-MCNC: 4.1 MG/DL (ref 2.5–4.5)
PLATELET # BLD AUTO: 66 10E9/L (ref 150–450)
PLATELET # BLD EST: ABNORMAL 10*3/UL
POTASSIUM SERPL-SCNC: 4.5 MMOL/L (ref 3.4–5.3)
RBC # BLD AUTO: 2.29 10E12/L (ref 4.4–5.9)
SODIUM SERPL-SCNC: 135 MMOL/L (ref 133–144)
SPECIMEN SOURCE: NORMAL
TACROLIMUS BLD-MCNC: 8.3 UG/L (ref 5–15)
TME LAST DOSE: NORMAL H
WBC # BLD AUTO: 3 10E9/L (ref 4–11)

## 2017-09-01 PROCEDURE — 97535 SELF CARE MNGMENT TRAINING: CPT | Mod: GO

## 2017-09-01 PROCEDURE — 25000132 ZZH RX MED GY IP 250 OP 250 PS 637: Performed by: PHYSICIAN ASSISTANT

## 2017-09-01 PROCEDURE — 36592 COLLECT BLOOD FROM PICC: CPT | Performed by: STUDENT IN AN ORGANIZED HEALTH CARE EDUCATION/TRAINING PROGRAM

## 2017-09-01 PROCEDURE — 80197 ASSAY OF TACROLIMUS: CPT | Performed by: STUDENT IN AN ORGANIZED HEALTH CARE EDUCATION/TRAINING PROGRAM

## 2017-09-01 PROCEDURE — 00000146 ZZHCL STATISTIC GLUCOSE BY METER IP

## 2017-09-01 PROCEDURE — 25000132 ZZH RX MED GY IP 250 OP 250 PS 637: Performed by: TRANSPLANT SURGERY

## 2017-09-01 PROCEDURE — 97530 THERAPEUTIC ACTIVITIES: CPT | Mod: GP

## 2017-09-01 PROCEDURE — 40000133 ZZH STATISTIC OT WARD VISIT

## 2017-09-01 PROCEDURE — 85025 COMPLETE CBC W/AUTO DIFF WBC: CPT | Performed by: STUDENT IN AN ORGANIZED HEALTH CARE EDUCATION/TRAINING PROGRAM

## 2017-09-01 PROCEDURE — 25000131 ZZH RX MED GY IP 250 OP 636 PS 637: Performed by: PHYSICIAN ASSISTANT

## 2017-09-01 PROCEDURE — 80048 BASIC METABOLIC PNL TOTAL CA: CPT | Performed by: STUDENT IN AN ORGANIZED HEALTH CARE EDUCATION/TRAINING PROGRAM

## 2017-09-01 PROCEDURE — 84100 ASSAY OF PHOSPHORUS: CPT | Performed by: STUDENT IN AN ORGANIZED HEALTH CARE EDUCATION/TRAINING PROGRAM

## 2017-09-01 PROCEDURE — 83735 ASSAY OF MAGNESIUM: CPT | Performed by: STUDENT IN AN ORGANIZED HEALTH CARE EDUCATION/TRAINING PROGRAM

## 2017-09-01 PROCEDURE — 97116 GAIT TRAINING THERAPY: CPT | Mod: GP

## 2017-09-01 PROCEDURE — 40000193 ZZH STATISTIC PT WARD VISIT

## 2017-09-01 PROCEDURE — 40000802 ZZH SITE CHECK

## 2017-09-01 PROCEDURE — 25000125 ZZHC RX 250: Performed by: STUDENT IN AN ORGANIZED HEALTH CARE EDUCATION/TRAINING PROGRAM

## 2017-09-01 PROCEDURE — 99213 OFFICE O/P EST LOW 20 MIN: CPT

## 2017-09-01 PROCEDURE — 12000026 ZZH R&B TRANSPLANT

## 2017-09-01 RX ORDER — LOPERAMIDE HCL 2 MG
4 CAPSULE ORAL 2 TIMES DAILY
Status: DISCONTINUED | OUTPATIENT
Start: 2017-09-01 | End: 2017-09-08 | Stop reason: HOSPADM

## 2017-09-01 RX ADMIN — CYCLOBENZAPRINE HYDROCHLORIDE 10 MG: 10 TABLET, FILM COATED ORAL at 16:03

## 2017-09-01 RX ADMIN — Medication 15 ML: at 11:23

## 2017-09-01 RX ADMIN — FLUDROCORTISONE ACETATE 0.1 MG: 0.1 TABLET ORAL at 08:48

## 2017-09-01 RX ADMIN — INSULIN ASPART 1 UNITS: 100 INJECTION, SOLUTION INTRAVENOUS; SUBCUTANEOUS at 13:16

## 2017-09-01 RX ADMIN — LOPERAMIDE HYDROCHLORIDE 4 MG: 2 CAPSULE ORAL at 08:59

## 2017-09-01 RX ADMIN — PANTOPRAZOLE SODIUM 40 MG: 20 TABLET, DELAYED RELEASE ORAL at 08:48

## 2017-09-01 RX ADMIN — Medication 15 ML: at 16:03

## 2017-09-01 RX ADMIN — TACROLIMUS 1.5 MG: 1 CAPSULE ORAL at 08:48

## 2017-09-01 RX ADMIN — SODIUM BICARBONATE 650 MG TABLET 650 MG: at 08:47

## 2017-09-01 RX ADMIN — AMOXICILLIN AND CLAVULANATE POTASSIUM 1 TABLET: 875; 125 TABLET, FILM COATED ORAL at 19:56

## 2017-09-01 RX ADMIN — INSULIN ASPART 3 UNITS: 100 INJECTION, SOLUTION INTRAVENOUS; SUBCUTANEOUS at 17:27

## 2017-09-01 RX ADMIN — Medication 15 ML: at 19:59

## 2017-09-01 RX ADMIN — LOPERAMIDE HYDROCHLORIDE 4 MG: 2 CAPSULE ORAL at 19:56

## 2017-09-01 RX ADMIN — SODIUM BICARBONATE 650 MG TABLET 650 MG: at 13:14

## 2017-09-01 RX ADMIN — AMOXICILLIN AND CLAVULANATE POTASSIUM 1 TABLET: 875; 125 TABLET, FILM COATED ORAL at 08:48

## 2017-09-01 RX ADMIN — FUROSEMIDE 20 MG: 20 TABLET ORAL at 08:48

## 2017-09-01 RX ADMIN — TACROLIMUS 1.5 MG: 1 CAPSULE ORAL at 17:27

## 2017-09-01 RX ADMIN — Medication 2 G: at 13:45

## 2017-09-01 RX ADMIN — AMLODIPINE BESYLATE 10 MG: 10 TABLET ORAL at 08:47

## 2017-09-01 RX ADMIN — Medication 15 ML: at 08:48

## 2017-09-01 RX ADMIN — CYCLOBENZAPRINE HYDROCHLORIDE 10 MG: 10 TABLET, FILM COATED ORAL at 04:34

## 2017-09-01 RX ADMIN — SODIUM BICARBONATE 650 MG TABLET 650 MG: at 19:56

## 2017-09-01 ASSESSMENT — PAIN DESCRIPTION - DESCRIPTORS: DESCRIPTORS: SORE

## 2017-09-01 NOTE — PROGRESS NOTES
Transplant Social Work Services Progress Note      Date of Initial Social Work Evaluation: 8/1/2017  Collaborated with: Pts wife, medical team.    Data: PT/OT now recommending home with assist instead of TCU. Pt was on [facility] list.  Intervention: SW left message for pts wife wanting to speak with her about the new recommendation of home instead of TCU. SW did not receive a call back but RNCC spoke with wife and pt about new recommendations which they are fine with. SW told [facility] to remove pt from their list. RNCC working on home care.  Assessment: Did not meet with pt or speak with wife.  Education provided by SW: None, did not meet with pt or wife.  Plan:    Discharge Plans in Progress: RNCC working on home care. SW took pt of [facility] list.    Barriers to d/c plan: Medical stability.    Follow up Plan: No follow up needed from SW.    If any needs arise please contact SW otherwise RNCC to follow until discharge.    JOSEY Chowdhury, LGSW  7A   Ph: [phone], Pager: [phone]

## 2017-09-01 NOTE — PROGRESS NOTES
"[facility] Nurse Inpatient Adult Pressure INJURY (PI) Wound Assessment     Follow up assessment-  Ileostomy    Follow up assessment of PI on- Sacrum      Data:   Patient History:      per MD note(s): This is a 51 year old male with complex PMH of CASTAÑEDA cirrhosis s/p Liver transplant Mar 2017.  Was admitted on 2014 with abdominal pain, sepsis, CT at OSH showed \"right colonic wall thickening suggesting colitis\" underwent Ex-Lap and washout for neutropenic colitis, intra-op was noted to have inflammed cecum and ascending colon with murky fluid.  Abdomen was left open at the time and was closed on 2017.  Some concern for CMV colitis with low count CMV viremia/GI PTLD and subsequently underwent colonoscopy on :  No colitis was seen on visualized portions of mucosa.     Current mattress:  Pulsate mattress   Current pressure relieving devices: Foam dressing, pt able to turn independently     Moisture Management:  Ileostomy,      Current Diet / Nutrition:       Active Diet Order      2 Gram K Diet      Kwasi Score  Av.5  Min: 10  Max: 22       Labs:     Recent Labs   Lab Test  17   0621   17   0711   17   0949   17   0316   ALBUMIN  1.8*   < >   --    < >   --    < >   --    HGB  8.1*   < >  7.7*   < >   --    < >  7.1*   INR   --    --    --    --   1.19*   < >  1.80*   WBC  3.4*   < >  8.8   < >   --    < >  0.8*   A1C   --    --    --    --    --    --   Canceled, Test credited   Below Assay Range  NOTIFIED LEONARD ONEILL AT 0538 ON 17 BY CHRISSY     CRP   --    --   190.0*   < >   --    < >   --     < > = values in this interval not displayed.                                                                                                                      Pressure Injury Assessment  (location #1):   Sacrum  Wound History:   Pt was transferred from  to  on  now transferred back to  on . Had multiple procedures throughout the day on 17 per " RN. Pt continues to be confused. He refuses to turn at times and rolls on his back right away due to c/o pain on his hips.        Pic taken- 7/26/17                                                  Pic taken-8/10            Pic taken- 8/17/17 8/21/17        Pic taken-8/29/17    8/3/17- Unable to take picture today as pt wanted to roll on his back due to pain on his ribs  8/7/17- Unable to take picture today as pt not able to tolerate side lying due to pain on his hips  8/14/17- Phone not available to take picture today      Wound Base: 100% visible follicular buds, deep dermis    Specific Dimensions (length x width x depth, in cm) :  1.9 x 1.5 x0.1 cm     Tunneling:  N/A    Undermining: N/A    Palpation of the wound bed:  Firm on bone    Slough appearance: None    Eschar appearance:  none    Periwound Skin: Healed moisture associated dermatitis now blanchable erythema    Color: pink    Temperature  normal     Drainage: small serous         Odor: none    Pain:  Painful         Intervention:     Patient's chart evaluated.      Kwasi Interventions:  Current Kwasi Interventions and Care Plan reviewed and updated, appropriate at this time.    Wound was assessed.    Wound Care: was done: per POC mentioned below.     Orders  reviewed    Supplies  Reviewed    Discussed plan of care with Nurse           Assessment:     Pressure Injury (PI) located on Sacrum: healing 2  Status: wound  Follow up assessment, Symptomatic, slowly healing        New ileostomy and Mucus fistula, stable. Completed teaching with his wife. Today initiated teaching with patient.         Plan:     Nursing to notify the Provider(s) and re-consult the [facility] Nurse if wound(s) deteriorate(s).  Plan of care for wound located on Sacrum every other day and PRN :   Cleanse the area with NS and pat dry.  Apply No sting barrier to periwound skin.  Apply triad paste lightly only on open areas.  Cover wound with Sacral  "Mepilex (#[id number])  Change dressing every other day and PRN.  Turn and reposition Q 2hrs.  Ensure pt has Adolph-cushion while sitting up in the chair.  Ensure pt is on pulsate mattress.   FYI- Only use Covidien pad no  pad.    Mucus fistula- Cleanse Mucus fistula with NS and pat dry.  Cover with a piece of adaptic and cover with premopore dressing.  Change dressing daily      BL buttocks and inner thigh BID-     Recommend to discontinue antifungal cream.    WOC Nurse will return: Friday  [facility]  WO Nurse Inpatient Ostomy Assessment       Follow up assessment of ostomy and needs for:  Ileostomy and Mucus fistula       Data:   History:    This is a 51 year old male with complex PMH of CASTAÑEDA cirrhosis s/p Liver transplant Mar 2017.  Was admitted on 7/4/2014 with abdominal pain, sepsis, CT at OSH showed \"right colonic wall thickening suggesting colitis\" underwent Ex-Lap and washout for neutropenic colitis, intra-op was noted to have inflammed cecum and ascending colon with murky fluid.  Abdomen was left open at the time and was closed on 7/5/2017.  Some concern for CMV colitis with low count CMV viremia/GI PTLD and subsequently underwent colonoscopy on 7/21:  No colitis was seen on visualized portions of mucosa.  Exam sub-optimal as colon filled with solid stool.  Random biopsies obtained.  On 7/25/2017 pt became more tachycardic, increasing O2 needs and altered, pt was intubated, hypotension responsive to IVFs, worsening kidney function.  Has not required pressors.  CT was obtained which showed a new large heterogeneous right sided retroperitoneal gas and air tracking along duodenum.  No contrast extravasation.  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  Despite conservative management with bowel rest and IV abx, pt continued to spike fevers.  Now s/p Exploratory laparotomy, right angelique-colectomy, end ileostomy, mucus fistula, partial omentectomy on 7/26.    Type of ostomy:  " "Ileostomy and Mucus fistula  Stoma assessment:   ? stoma:  11/4\" , viable, pink, pale, round, moist, and protruberant  ? A bridge in place?: No  ? Stent(s) in place?: Not applicable,   Mucocutaneous Junction (MCJ):  Intact   Peristomal skin:  Intact, minimal rash (protected with No sting film barrier)  Abdominal assessment: soft   ? N/G still in place?:  Yes   Output:  small, watery, green    I/O last 3 completed shifts:  In: 430 [P.O.:240; I.V.:70; Other:120]  Out: 2525 [Urine:1300; Drains:200; Stool:1025]     Current pouching system:  [place], 57mm flat two piece, high output, cut to fit and flat and barrier ring with minimal melting around the cutting edges.   Pain:  Complaining of pain during cleansing  ? Is pt still on a PCA? No  Diet:       Active Diet Order      2 Gram K Diet            Intervention:     Patient's chart evaluated.      Assessments done today:  Stoma, peristomal skin, and learning needs  ? Participants: pt only today. His wife and mother has been taught on ostomy management  ?  Educational intervention: method: Demonstrated pouch change to the patient. He was able to trace, cleanse peristomal skin and apply barrier ring on to the wafer.  Prepared for discharge by: No discharge preparations started         Assessment:     Stoma assessment: viable and healthy    Complications: None    Learning needs/ comprehension: pt is attentive and willing to learn.    Is pt able to demonstrate: 1. how to empty their pouch?  No: Demonstrated today  2. How to read and write down correct I and O's?   No: Demonstrated today    Effectiveness of current pouching/ supply plan: > 72 hrs         Plan:     Plan:   ? Current pouching system: 1 pc [place] flat CTF pouch with barrier ring    Education:  ? Educational needs: practice with How to empty pouch, Removal of pouch, Preparation of new pouch, Cutting out or evaluating a pattern, Applying paste or rings, Applying appliance to abdomen, Peristomal skin care, " Diet and hydration / fluid balance, Odor / flatus management and Lifestyle Adjustments    ? Continue to prepare for discharge:  Supplies ordered, Completed supply list, Registered for samples from , Prescriptions or note left on chart for MD to sign/complete, Prepared for discharge to home with homecare, Discussed making a WO Nurse outpatient appointment upon discharge, Discussed when to follow up with a WO Nurse in the future and Discussed how/ where to order supplies   ? Do WOC Nurse recommend home care?  Possible TCU   Plan for ileostomy and Mucus fistula: RN to change pouch twice a week (M/F)  Supplies Needed  Kyara 1 pc flat CTF  Barrier ring- (#[id number])     Ileostomy care- Change pouch twice a week (M/F)  1. Gather all supplies and remove old pouch gently.  2. Cleanse peristomal skin with w arm water and soft wash cloth or gauze and dry thoroughly.  3. Cut the wafer to fit to stoma or use given pattern, apply barrier ring around the cutting surface and apply centering the stoma.  4. Attach the pouch  5. Massage around it for better adherence.    Face to face time- 40 mins

## 2017-09-01 NOTE — PLAN OF CARE
Problem: Goal Outcome Summary  Goal: Goal Outcome Summary  Outcome: No Change  /83 (BP Location: Left arm)  Pulse 83  Temp 98  F (36.7  C) (Oral)  Resp 18  Ht 1.829 m (6')  Wt 95.3 kg (210 lb 1.6 oz)  SpO2 97%  BMI 28.49 kg/m2     Patient VSS on RA; afebrile.  and 199. Given flexeril for low back pain, lidocaine patch in place on low back. Denies nausea on regular diet, kit counts. Encouraging oral intake. DL PICC SL. Voiding adequately, ileostomy in place with loose stool, imodium given earlier in the day. Up with SBA and walker. Visited with wife and sat outside. Plan for drain and ostomy teaching on Tuesday and d/c after.

## 2017-09-01 NOTE — PLAN OF CARE
Problem: Individualization  Goal: Patient Preferences     AVSS, reporting back pain this afternoon, flexeril given with relief.  Blood zglykldn=854, 143, 199  Patient up with standby assist, voiding, ileostomy with good output, three drains to gravity (all irrigated per order - sites cleansed and new dressings placed), tolerating diet with good appetite (calorie counts documented).  WOCRN educated patient on ostomy cares.  Scheduled meds given as ordered.  Continue to monitor, treat as ordered, notify team with changes.  PLC drain class scheduled for Tuesday 9/5 1230 (wife notified).

## 2017-09-01 NOTE — PROGRESS NOTES
Care Coordinator- Discharge Planning     Admission Date/Time:  7/4/2017  Attending MD:  Jovan Tran MD     Data  Date of initial CC assessment:  8.1.17  Chart reviewed, discussed with interdisciplinary team.   Patient was admitted for:   1. Neutropenic colitis (H)    2. Liver transplant recipient (H)    3. Delirium    4. S/P right hemicolectomy    5. Intra-abdominal abscess (H)    6. Hyperkalemia    7. Sepsis, due to unspecified organism (H)    8. Essential hypertension    9. Dry mouth    10. S/P liver transplant (H)    11. Cytomegalovirus infection, unspecified cytomegaloviral infection type (H)    12. Gangrene of finger (H)    13. Type 2 diabetes mellitus with diabetic polyneuropathy, unspecified long term insulin use status (H)    14. Acute low back pain without sciatica, unspecified back pain laterality    15. HCAP (healthcare-associated pneumonia)       Camacho Bhagat is a 52 year old male with history of CASTAÑEDA cirrhosis s/p liver transplant on 3/4/17. Admitted 7/4/17 from [facility] with sepsis secondary to colitis, taken to OR for initial ex-lap with findings of typhlitis in the right colon, wound left open with wound vac in place for re exploration and interval closure on 7/5/17. Concern for CMV colitis with low count CMV viremia, underwent colonoscopy on 7/21/17, biopsies obtained.  On 7/25/17 patient became septic with abdominal compartment syndrome.  CT noted new large heterogeneous right sided retroperitoneal gas and air tracking along duodenum, and patient underwent an ex-lap, right angelique-colectomy, end ileostomy, mucus fistula, partial omentectomy on 7/26/17 by colorectal surgery.  Post-op course complicated by retroperitoneal abscess/peritonitis requiring percutaneous drainage--per CARLOS Pino, note today. Plan for d/c Tuesday, 9/5 to home with 24/7 supervision.  Assessment  Concerns with insurance coverage for discharge needs: BCBS is [employer]) and they bill thru [employer] called  "Abigail and they deny the  agencies reimbursement outside of [facility]. He may not have coverage for HHn visits in [place] or [place]  Current Living Situation: Patient lives with spouse.  Support System: Supportive and Involved  Services Involved: Home Care  Transportation: Car and Family or Friend will provide  Barriers to Discharge: labs/ileostomy output        Coordination of Care and Referrals: Provided patient/family with options for Home Care.  I met with Camacho in his room (his wife Abimael will not be here until 5pm this evening and he gave me permission to call her cell and I did). Camacho will d/c to [address] [place] [address] and his mother Humaira, who is retired will stay with him 24/7 and Abimael verified this--Abimael has to work. Pt says this home is 1 level vs his [place] house has stairs. Pt has 3 walkers and a bath bench and dressing \"utensils\" and has a working glucometer and strips. PT recommends home PT and he refuses (Abimael asked me to add it to the n orders, in case she can talk him into it.  Pt agrees to N for new ileostomy/drains and assessments with lab draws. I called [facility] and per Vernell they cannot take his [employer] insurance or he can pay $160 per visit. I called Ramu at [facility] [phone] #2/F: [phone] and she is checking his insurance--start of care date Thursday 9/7--wait for call back.    [facility] nurse: 9/1 was working with Camacho and his ostomy bag--may need to be independent with bag changes and SUPPLY ordering before discharge if HHN cannot visit    [facility] on Tuesday 9/5 @ 12:30pm for drain irrigation class--abimael is aware and will be here.  Caregiver: wife, abimael Mother Humaira and friend Zainab Deborah and daughter Maribell Bhagat  Pt's PCP: Dr Shay Kirkpatrick at [facility]  LT transplant surgeon: Jovan Tran  Pt is to f/u with a vasular specialist for : Vascular:  Microvascular ischemic injury in digits (toes, finger) this is most likely due to injury while patient was on pressor " therapy with sepsis. Erythema right 1st finger documented and marked, now resolved.   OP Labs: he will come to [facility] lab [facility] or he goes to [facility]  OP CC: Abril Doty  Pt is to be on ASA 81 mg QD for radial artery        Plan  Anticipated Discharge Date:  Tuesday. 9/5  Anticipated Discharge Plan:  To his home in [place], [place] with above caregivers 24/7  Wait for [facility] to call back    CTS Handoff completed:  YES @ d/c to Abril Shearer RN

## 2017-09-01 NOTE — PROGRESS NOTES
Calorie Counts  Intake recorded for: 8/31  Kcals: 1431  Protein: 52g  # Meals Recorded: 2 meals (First - 100% 12 oz apple juice, English muffin with butter and jelly, 4 oz milk, peaches)       (Second - 100% 2 chicken strips and 50% french fries from [employer])  # Supplements Recorded: 100% 1 Ensure Plus, 100% 1 Ensure Clear

## 2017-09-01 NOTE — PLAN OF CARE
Problem: Goal Outcome Summary  Goal: Goal Outcome Summary  Outcome: No Change  Afebrile, VSS, pt on 2 L 02 overnight (sats 88% RA when pt is sleeping). Pt alert, occasionally disoriented to time. Bg 122 at 0200. Flexeril given x 1 for back spasms. Slight crackles heard in RLL, pt denies SOB. IS and acapella encouraged. Drains irrigated per orders, 20 out from L drain, 60 out from R retroperitoneal drain, and 5 out from R pigtail drain. 400 cc loose stool out from illeostomy, pt voided 900 cc. Pt up with assist of 1 and a walker. Will have drain teaching class on Tuesday.

## 2017-09-01 NOTE — PLAN OF CARE
"Problem: Goal Outcome Summary  Goal: Goal Outcome Summary  OT 7A: Pt engaged in simulated med task to facilitate safety and independence in higher level cognitive activities. Pt successful with 4/5 medications with 1 VC to identify morning vs evening med boxes. Pt reports having different colored boxes at home to differentiate the two and did not see the label attached to these boxes. Pt incorrect in \"as needed\" medication with education provided to facilitate safety with administration. Pt demonstrating improvement in function but still benefiting from OT to continue maximizing functional independence. Rec: home with family assist PRN and supervision for higher level cognitive tasks such as meal preparation, medication and financial management.       "

## 2017-09-01 NOTE — PLAN OF CARE
Problem: Goal Outcome Summary  Goal: Goal Outcome Summary  PT 7A: Pt tolerated session very well. Walked a total of 500' with manual w/c as AD. Some short distance amb without AD as well. No LOB or near falls either. Encouraged continued walking program while inpatient. Recommend pt discharge home with PRN assist. Would benefit from home PT but declines at this time.

## 2017-09-01 NOTE — PROGRESS NOTES
Immunosuppression Note:    Camacho Bhagat is a 51 year old male who is seen today  for immunosuppression management     I, Jovan Tran MD, I have examined the patient with the resident/PA/Fellow, discussed and agree with the note and findings.  I have reviewed today's vital signs, medications, labs and imaging. I reviewed the immunosuppression medications and levels. I spoke to the patient/family and explained below clinical details and answered all the questions      Transplant Surgery  Inpatient Daily Progress Note  09/01/2017    Assessment & Plan: Camacho Bhagat is a 52 year old male with history of CASTAÑEDA cirrhosis s/p liver transplant on 3/4/17. Admitted 7/4/17 from [facility] with sepsis secondary to colitis, taken to OR for initial ex-lap with findings of typhlitis in the right colon, wound left open with wound vac in place for re exploration and interval closure on 7/5/17. Concern for CMV colitis with low count CMV viremia, underwent colonoscopy on 7/21/17, biopsies obtained.  On 7/25/17 patient became septic with abdominal compartment syndrome.  CT noted new large heterogeneous right sided retroperitoneal gas and air tracking along duodenum, and patient underwent an ex-lap, right angelique-colectomy, end ileostomy, mucus fistula, partial omentectomy on 7/26/17 by colorectal surgery.  Post-op course complicated by retroperitoneal abscess/peritonitis requiring percutaneous drainage.    Graft Function: Good function. LFTs acceptable (checking every M, Th).   Immunosuppression Management:   CellCept discontinued (6/27/17) due to neutropenia.   Prograf goal, resume goal 6-8. 8/29 level 8.4, 11.5.  Continue 1.5 mg BID. Check level tomorrow.  Cardiorespiratory:   -Suspected RONNIE: Uses O2 overnight only for brief apnea while sleeping per nursing.  -HTN: SBP 120s-160s. Continue amlodipine 10 mg daily. Added HCTZ 25 mg daily for HTN and hyperkalemia. Pt on daily Florinef daily for hyperkalemia.   -R/o HCAP:  New  productive cough on 8/24, no fever, improved.  CXR: retrocardiac opacity.  Already on Augmentin.  MRSA swab negative.  Duonebs and mycomust x 2 days, completed.  Out of bed. Aggressive pulmonary toilet.  Hematology: Pancytopenia present on admission, persists.  Contributing factors include medications and CMV infection.  -Anemia- Hemoglobin 7.4 (7.6). Dropped below 7 8/26 with bloody ileostomy output, scope completed, see below. Blood transfusion on 8/26 (2 units). PLT trending down. Possible due to recent bleed. Med list reviewed. Send HIT assay. ASA for HAT ppx, will stop as pt ~ 6 months. Will not restart Hep ppx.   -Leukopenia/neutropenia- WBC 2.7, ANC > 800.  Received GCSF 8/20, 8/22-25.  -Thrombocytopenia-  Plt 76 trending down. Possible due to recent bleeding. Previously dropped with linezolid.  GI:   -Neutropenic colitis on admission. Colonoscopy 7/21. Biopsy: resolving injury secondary to possible drug induced vs infectious.    -Bowel perforation: 7/25 CT scan abd/pelvis-new large heterogeneous right sided retroperitoneal gas and air tracking along duodenum.  No contrast extravasation.  No intraperitoneal air noted. Colorectal surgery performed Ex lap, right angelique-colectomy, end ileostomy, mucus fistula, partial omentectomy on 7/26. Findings no neida perforation, phlegmon/inflammation right colon. Colon pathology suggested colon perforation, negative for malignancy; CMV stain positive.    -Retroperitoneal abscess/ peritonitis: See ID section below.  -Diet:  NJ tube was dislodged, hold off on replacement for a few days per Dr. Tran to monitor patient's intake.  Low K diet with aspiration precautions.  Magen counts.  -High output  ileostomy:  Goal ileostomy output ~ 1200 cc in 24 hrs. Ileostomy output sanguinous liquid with clots 8/26. Appreciate GI recs.  Ileoscopy and EGD 8/27 showed no active bleeding or ulceration, per GI bleeding suspected from ileostomy.  No plan for capsule endoscopy at this time.  Ostomy output brown with no signs of bleeding today. Monitor.  Ostomy output decreased with stopping tube feeding. Change Loperamide, lomotiland fiber discontinued. Ostomy output 925 cc yesterday. Will restart Loperamide once daily.   Fluid/Electrolytes/Renal:   -EJ: on admission, d/t ATN sec to sepsis. Now resolved, Cr 1.  -Hypernatremia: Resolved with free water.    -Hyperkalemia: Neph consulted earlier in hospitalization. 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  On daily bicarb, PRN lasix, florinef, low K diet. Reconsulted nephrology. Recommended HCTZ for HTN and hyperkalemia. Continue Florinef 0.1 mg and sodium bicarbonate. K 4.8. Consider switching HCTZ to lasix if K increased.   Endocrine: DM type 2. Endocrine consulting for glycemic management.  Glargine and NPH stopped as tube feeding stopped. Continue SSI QID and carb coverage with meals.   Infectious disease: Afebrile.  WBC 2.7.   -Retroperitoneal abscess/peritonitis: CT C/A/P 8/9 showed a persistent gas/fluid collection RLQ, peritonitis, some free air but no evidence of contrast extravasation.  8/9 retroperitoneal drain placed, cx + clostridium septicum.  8/11 paracentesis: WBC 5,038, drain cultures + clostridium septicum (day 6, broth only).  8/14 CT scan abd/pelvis: Complex gas-containing fluid collection in the right retroperitoneum decreased slightly in size, moderate ascites with intraperitoneal gas, peritonitis noted, no bowel leak.  8/15 peritoneal drain placement: WBC 15,680, culture negative.  8/23 CT:  Fluid collections slightly larger.  Patient clinically worse with increased AMS.  8/24 IR replaced retroperitoneal drain, placed new 12F LLQ drain.  Patient improved clinically after drains placed.   8/25 fluid culture from LLQ drain, budding yeast, pt doing well. Will monitor. Repeat imaging today to evaluate fluid collections. Plan to stop Augmentin next week (4 weeks on 9/6).   Abx:  zosyn (8/9-8/22), linezolid (8/9-8/15), switched to Augmentin (8/22-present).    -CMV viremia: Primary CMV infection.  (CMV R-D+) CMV colitis per pathology, peak serum 7/15 4225 IU/ml.   IV Ganciclovir (started 7/20).  CMV count fell to <137. Decreased Ganciclovir to 5mg/kg on 8/18.  Switched to valcyte 900mg daily 8/22, continue until 8/26.  Check CMV quant weekly until 9/24 and then every other week for 2 months. Last check 8/24 CMV not detected. Check next 8/31.  -EBV viremia:  Peak serum 406,697 copies/ml on 7/18.   753 copies/ml on 8/15.  Check quant monthly, due 9/15.  -Cellulitis/dry gangrene:  Right 1st finger, on linezolid (8/19- 8/22), switched to doxycycline x7 days (8/22-8/28). Clinically resolved.  -Reference ID note from 8/28 for final recs.  Infectious disease resolved issues:  -Severe sepsis: On admission, secondary to right colon phlegmon. Meropenem/daptomycin/micafungin tx x 10 days completed on 8/3. S/p right angelique-colectomy.  Path: CMV stain +, perforation of colon noted.   Neuro: Toxic metabolic encephalopathy secondary to infection, prolonged hospital stay.  Improved.  Vascular:  Microvascular ischemic injury in digits (toes, finger) this is most likely due to injury while patient was on pressor therapy with sepsis. Erythema right 1st finger documented and marked, now resolved.   Activity: Deconditioned due to prolong hospital course. Daily PT/OT, encourage pt to be OOB to chair at least TID (patient prefers up to side of bed).   Prophylaxis: DVT-Heparin SQ, hold today due to bleeding.  Disposition: 7A. Plan to discharge to home. Continue with daily PT/OT/OOB as much as possible. Possible d/c early next week.    Medical Decision Making: Medium    PILLO/Fellow/Resident Provider: Ada Driver PA-C [id number]    Faculty: Jovan Tran MD     __________________________________________________________________  Interval History:   Overnight events:   Appetite poor, though continues to have poor appetite. Ambulating. Participating in education.       ROS:   A 10-point review of  systems was negative except as noted above.    Meds:    loperamide  4 mg Oral BID     lidocaine  1 patch Transdermal Q24h    And     lidocaine   Transdermal Q24H    And     lidocaine   Transdermal Q8H     sodium bicarbonate  650 mg Per NG tube TID     furosemide  20 mg Oral Daily     tacrolimus  1.5 mg Oral BID IS     insulin aspart  1-10 Units Subcutaneous TID AC     insulin aspart  1-7 Units Subcutaneous At Bedtime     amoxicillin-clavulanate  1 tablet Oral Q12H FRANCO     amLODIPine  10 mg Oral Daily     pantoprazole  40 mg Oral Daily     menthol   Transdermal Q8H     insulin aspart   Subcutaneous TID w/meals     artificial saliva  15 mL Swish & Spit 4x Daily     fludrocortisone  0.1 mg Oral Daily     sodium chloride (PF)  10 mL Irrigation Q8H     sodium chloride (PF)  20 mL Irrigation Q6H     miconazole with skin protectant   Topical BID     sodium chloride (PF)  3 mL Intracatheter Q8H       Physical Exam:     Admit Weight: 94.2 kg (207 lb 11.2 oz) (SCALE 2)    Current vitals:   /78 (BP Location: Left arm)  Pulse 91  Temp 98.9  F (37.2  C) (Oral)  Resp 16  Ht 1.829 m (6')  Wt 93.9 kg (207 lb 1.6 oz)  SpO2 92%  BMI 28.09 kg/m2    Vital sign ranges:    Temp:  [98  F (36.7  C)-99.1  F (37.3  C)] 98.9  F (37.2  C)  Pulse:  [91-94] 91  Heart Rate:  [80-94] 91  Resp:  [16-18] 16  BP: (136-160)/(76-89) 144/78  SpO2:  [88 %-98 %] 92 %  Patient Vitals for the past 24 hrs:   BP Temp Temp src Pulse Heart Rate Resp SpO2 Weight   09/01/17 1224 144/78 98.9  F (37.2  C) Oral 91 91 16 92 % -   09/01/17 1131 - - - - - - - 93.9 kg (207 lb 1.6 oz)   09/01/17 0957 149/80 - - 91 91 - 93 % -   09/01/17 0742 160/89 98.4  F (36.9  C) Oral 94 94 16 92 % -   09/01/17 0438 154/80 98.3  F (36.8  C) Oral - 80 18 96 % -   09/01/17 0217 - - - - - - 96 % -   09/01/17 0216 - - - - - - (!) 88 % -   08/31/17 2332 148/81 98.2  F (36.8  C) Oral - 85 18 93 % -   08/31/17 2001 154/83 98  F (36.7  C) Oral - 85 18 97 % -   08/31/17 1548  136/76 99.1  F (37.3  C) Oral - 80 18 98 % -     General Appearance: NAD  Skin: normal, warm, dry  Heart: RRR, well perfused  Lungs: Nonlabored resps on RA  Abdomen: soft, rounded, mild tender LQ unchanged. Ileostomy liquid slightly reddish brown no signs of bleeding from stoma.  Mucus fistula covered. Midline incision, c/d/i.  Right abdominal drains: both serosang.  LLQ drain: serosang output.  : No vazquez  Extremities: No edema. R index finger with necrotic blacked finger.  Necrotic blackened areas on right 1st & 2nd toes and left 2nd toe.  Neurologic: A&O x4. No tremor      Data:   CMP    Recent Labs  Lab 09/01/17  0625 08/31/17  0545  08/28/17  0618    138  < > 140   POTASSIUM 4.5 4.8  < > 5.5*   CHLORIDE 102 104  < > 108   CO2 26 28  < > 25   * 132*  < > 92   BUN 22 24  < > 29   CR 1.02 1.03  < > 0.97   GFRESTIMATED 76 76  < > 81   GFRESTBLACK >90 >90  < > >90   JACOB 8.1* 7.9*  < > 8.1*   MAG 1.7 1.8  < > 1.7   PHOS 4.1 4.8*  < > 4.2   ALBUMIN  --  1.8*  --  1.8*   BILITOTAL  --  0.5  --  0.6   ALKPHOS  --  204*  --  251*   AST  --  31  --  44   ALT  --  14  --  19   < > = values in this interval not displayed.  CBC    Recent Labs  Lab 09/01/17  0625 08/31/17  0545   HGB 7.0* 7.4*   WBC 3.0* 2.9*   PLT 66* 72*     COAGS  No lab results found in last 7 days.    Invalid input(s): XA   Urinalysis  Recent Labs   Lab Test  08/10/17   1752  08/08/17   1600   06/14/17   1508   04/11/16   1345   COLOR  Yellow  Yellow   < >   --    < >  Yellow   APPEARANCE  Slightly Cloudy  Slightly Cloudy   < >   --    < >  Clear   URINEGLC  Negative  Negative   < >   --    < >  30*   URINEBILI  Small   This is an unconfirmed screening test result. A positive result may be false.  *  Negative   < >   --    < >  Negative   URINEKETONE  Negative  Negative   < >   --    < >  Negative   SG  1.012  1.010   < >   --    < >  1.016   UBLD  Negative  Negative   < >   --    < >  Small*   URINEPH  5.0  5.0   < >   --    < >  5.0    PROTEIN  30*  30*   < >   --    < >  30*   NITRITE  Negative  Negative   < >   --    < >  Negative   LEUKEST  Negative  Negative   < >   --    < >  Negative   RBCU  0  3*   < >   --    < >  1   WBCU  10*  7*   < >   --    < >  1   UTPG   --    --    --   1.55*   --   0.41*    < > = values in this interval not displayed.

## 2017-09-02 LAB
ABO + RH BLD: NORMAL
ABO + RH BLD: NORMAL
ANION GAP SERPL CALCULATED.3IONS-SCNC: 8 MMOL/L (ref 3–14)
ANISOCYTOSIS BLD QL SMEAR: ABNORMAL
BASOPHILS # BLD AUTO: 0 10E9/L (ref 0–0.2)
BASOPHILS NFR BLD AUTO: 1 %
BLD GP AB SCN SERPL QL: NORMAL
BLD PROD TYP BPU: NORMAL
BLD UNIT ID BPU: 0
BLD UNIT ID BPU: 0
BLOOD BANK CMNT PATIENT-IMP: NORMAL
BLOOD PRODUCT CODE: NORMAL
BLOOD PRODUCT CODE: NORMAL
BPU ID: NORMAL
BPU ID: NORMAL
BUN SERPL-MCNC: 24 MG/DL (ref 7–30)
CALCIUM SERPL-MCNC: 8.1 MG/DL (ref 8.5–10.1)
CHLORIDE SERPL-SCNC: 102 MMOL/L (ref 94–109)
CO2 SERPL-SCNC: 25 MMOL/L (ref 20–32)
CREAT SERPL-MCNC: 1.1 MG/DL (ref 0.66–1.25)
DIFFERENTIAL METHOD BLD: ABNORMAL
EOSINOPHIL # BLD AUTO: 0 10E9/L (ref 0–0.7)
EOSINOPHIL NFR BLD AUTO: 0 %
ERYTHROCYTE [DISTWIDTH] IN BLOOD BY AUTOMATED COUNT: 19.9 % (ref 10–15)
ERYTHROCYTE [DISTWIDTH] IN BLOOD BY AUTOMATED COUNT: 20.7 % (ref 10–15)
GFR SERPL CREATININE-BSD FRML MDRD: 70 ML/MIN/1.7M2
GLUCOSE BLDC GLUCOMTR-MCNC: 123 MG/DL (ref 70–99)
GLUCOSE BLDC GLUCOMTR-MCNC: 129 MG/DL (ref 70–99)
GLUCOSE BLDC GLUCOMTR-MCNC: 131 MG/DL (ref 70–99)
GLUCOSE BLDC GLUCOMTR-MCNC: 137 MG/DL (ref 70–99)
GLUCOSE BLDC GLUCOMTR-MCNC: 202 MG/DL (ref 70–99)
GLUCOSE BLDC GLUCOMTR-MCNC: 202 MG/DL (ref 70–99)
GLUCOSE SERPL-MCNC: 147 MG/DL (ref 70–99)
HCT VFR BLD AUTO: 20.2 % (ref 40–53)
HCT VFR BLD AUTO: 23.9 % (ref 40–53)
HGB BLD-MCNC: 6.7 G/DL (ref 13.3–17.7)
HGB BLD-MCNC: 8 G/DL (ref 13.3–17.7)
LACTATE BLD-SCNC: 0.4 MMOL/L (ref 0.7–2)
LYMPHOCYTES # BLD AUTO: 0.6 10E9/L (ref 0.8–5.3)
LYMPHOCYTES NFR BLD AUTO: 19 %
MAGNESIUM SERPL-MCNC: 2 MG/DL (ref 1.6–2.3)
MCH RBC QN AUTO: 30.1 PG (ref 26.5–33)
MCH RBC QN AUTO: 30.3 PG (ref 26.5–33)
MCHC RBC AUTO-ENTMCNC: 33.2 G/DL (ref 31.5–36.5)
MCHC RBC AUTO-ENTMCNC: 33.5 G/DL (ref 31.5–36.5)
MCV RBC AUTO: 90 FL (ref 78–100)
MCV RBC AUTO: 91 FL (ref 78–100)
MONOCYTES # BLD AUTO: 0.3 10E9/L (ref 0–1.3)
MONOCYTES NFR BLD AUTO: 9 %
NEUTROPHILS # BLD AUTO: 2.3 10E9/L (ref 1.6–8.3)
NEUTROPHILS NFR BLD AUTO: 71 %
NUM BPU REQUESTED: 2
PHOSPHATE SERPL-MCNC: 4 MG/DL (ref 2.5–4.5)
PLATELET # BLD AUTO: 70 10E9/L (ref 150–450)
PLATELET # BLD AUTO: 73 10E9/L (ref 150–450)
POTASSIUM SERPL-SCNC: 4.8 MMOL/L (ref 3.4–5.3)
RBC # BLD AUTO: 2.21 10E12/L (ref 4.4–5.9)
RBC # BLD AUTO: 2.66 10E12/L (ref 4.4–5.9)
SODIUM SERPL-SCNC: 135 MMOL/L (ref 133–144)
SPECIMEN EXP DATE BLD: NORMAL
TACROLIMUS BLD-MCNC: <3 UG/L (ref 5–15)
TME LAST DOSE: ABNORMAL H
TRANSFUSION STATUS PATIENT QL: NORMAL
WBC # BLD AUTO: 3.2 10E9/L (ref 4–11)
WBC # BLD AUTO: 3.7 10E9/L (ref 4–11)

## 2017-09-02 PROCEDURE — 83605 ASSAY OF LACTIC ACID: CPT | Performed by: TRANSPLANT SURGERY

## 2017-09-02 PROCEDURE — 80197 ASSAY OF TACROLIMUS: CPT | Performed by: STUDENT IN AN ORGANIZED HEALTH CARE EDUCATION/TRAINING PROGRAM

## 2017-09-02 PROCEDURE — 36592 COLLECT BLOOD FROM PICC: CPT

## 2017-09-02 PROCEDURE — 12000026 ZZH R&B TRANSPLANT

## 2017-09-02 PROCEDURE — 25000132 ZZH RX MED GY IP 250 OP 250 PS 637: Performed by: TRANSPLANT SURGERY

## 2017-09-02 PROCEDURE — 86850 RBC ANTIBODY SCREEN: CPT

## 2017-09-02 PROCEDURE — 85027 COMPLETE CBC AUTOMATED: CPT

## 2017-09-02 PROCEDURE — 84100 ASSAY OF PHOSPHORUS: CPT | Performed by: STUDENT IN AN ORGANIZED HEALTH CARE EDUCATION/TRAINING PROGRAM

## 2017-09-02 PROCEDURE — P9016 RBC LEUKOCYTES REDUCED: HCPCS

## 2017-09-02 PROCEDURE — 25000132 ZZH RX MED GY IP 250 OP 250 PS 637

## 2017-09-02 PROCEDURE — 83735 ASSAY OF MAGNESIUM: CPT | Performed by: STUDENT IN AN ORGANIZED HEALTH CARE EDUCATION/TRAINING PROGRAM

## 2017-09-02 PROCEDURE — 25000132 ZZH RX MED GY IP 250 OP 250 PS 637: Performed by: PHYSICIAN ASSISTANT

## 2017-09-02 PROCEDURE — 86923 COMPATIBILITY TEST ELECTRIC: CPT

## 2017-09-02 PROCEDURE — 25000131 ZZH RX MED GY IP 250 OP 636 PS 637: Performed by: TRANSPLANT SURGERY

## 2017-09-02 PROCEDURE — 36415 COLL VENOUS BLD VENIPUNCTURE: CPT | Performed by: STUDENT IN AN ORGANIZED HEALTH CARE EDUCATION/TRAINING PROGRAM

## 2017-09-02 PROCEDURE — 36592 COLLECT BLOOD FROM PICC: CPT | Performed by: TRANSPLANT SURGERY

## 2017-09-02 PROCEDURE — 85025 COMPLETE CBC W/AUTO DIFF WBC: CPT | Performed by: STUDENT IN AN ORGANIZED HEALTH CARE EDUCATION/TRAINING PROGRAM

## 2017-09-02 PROCEDURE — 80048 BASIC METABOLIC PNL TOTAL CA: CPT | Performed by: STUDENT IN AN ORGANIZED HEALTH CARE EDUCATION/TRAINING PROGRAM

## 2017-09-02 PROCEDURE — 86900 BLOOD TYPING SEROLOGIC ABO: CPT

## 2017-09-02 PROCEDURE — 86901 BLOOD TYPING SEROLOGIC RH(D): CPT

## 2017-09-02 PROCEDURE — 25000131 ZZH RX MED GY IP 250 OP 636 PS 637: Performed by: PHYSICIAN ASSISTANT

## 2017-09-02 PROCEDURE — 00000146 ZZHCL STATISTIC GLUCOSE BY METER IP

## 2017-09-02 PROCEDURE — 40000802 ZZH SITE CHECK

## 2017-09-02 RX ORDER — TACROLIMUS 1 MG/1
1 CAPSULE ORAL ONCE
Status: COMPLETED | OUTPATIENT
Start: 2017-09-02 | End: 2017-09-02

## 2017-09-02 RX ADMIN — PANTOPRAZOLE SODIUM 40 MG: 20 TABLET, DELAYED RELEASE ORAL at 08:37

## 2017-09-02 RX ADMIN — AMLODIPINE BESYLATE 10 MG: 10 TABLET ORAL at 08:37

## 2017-09-02 RX ADMIN — AMOXICILLIN AND CLAVULANATE POTASSIUM 1 TABLET: 875; 125 TABLET, FILM COATED ORAL at 08:37

## 2017-09-02 RX ADMIN — TACROLIMUS 1.5 MG: 1 CAPSULE ORAL at 17:30

## 2017-09-02 RX ADMIN — FLUDROCORTISONE ACETATE 0.1 MG: 0.1 TABLET ORAL at 08:37

## 2017-09-02 RX ADMIN — FUROSEMIDE 20 MG: 20 TABLET ORAL at 08:37

## 2017-09-02 RX ADMIN — CYCLOBENZAPRINE HYDROCHLORIDE 10 MG: 10 TABLET, FILM COATED ORAL at 02:45

## 2017-09-02 RX ADMIN — SODIUM BICARBONATE 650 MG TABLET 650 MG: at 21:18

## 2017-09-02 RX ADMIN — TACROLIMUS 1 MG: 1 CAPSULE ORAL at 17:30

## 2017-09-02 RX ADMIN — LIDOCAINE 1 PATCH: 50 PATCH CUTANEOUS at 21:17

## 2017-09-02 RX ADMIN — Medication 15 ML: at 08:37

## 2017-09-02 RX ADMIN — SODIUM BICARBONATE 650 MG TABLET 650 MG: at 08:37

## 2017-09-02 RX ADMIN — SODIUM BICARBONATE 650 MG TABLET 650 MG: at 13:23

## 2017-09-02 RX ADMIN — CYCLOBENZAPRINE HYDROCHLORIDE 10 MG: 10 TABLET, FILM COATED ORAL at 11:51

## 2017-09-02 RX ADMIN — LOPERAMIDE HYDROCHLORIDE 4 MG: 2 CAPSULE ORAL at 21:18

## 2017-09-02 RX ADMIN — LOPERAMIDE HYDROCHLORIDE 4 MG: 2 CAPSULE ORAL at 08:37

## 2017-09-02 RX ADMIN — TACROLIMUS 1.5 MG: 1 CAPSULE ORAL at 08:37

## 2017-09-02 RX ADMIN — Medication 15 ML: at 11:51

## 2017-09-02 RX ADMIN — Medication 15 ML: at 15:24

## 2017-09-02 RX ADMIN — AMOXICILLIN AND CLAVULANATE POTASSIUM 1 TABLET: 875; 125 TABLET, FILM COATED ORAL at 21:18

## 2017-09-02 ASSESSMENT — PAIN DESCRIPTION - DESCRIPTORS: DESCRIPTORS: SORE

## 2017-09-02 NOTE — PLAN OF CARE
Problem: Individualization  Goal: Patient Preferences     AVSS, reporting back pain midmorning, flexeril given with relief.  Blood wjibpean=869, 137, 129  Patient disoriented to time and intermittently making odd statements about people and things that are not currently present.  Bed alarm on for patient safety.  Patient up with standby assist, ambulating in bishop with walker, voiding, ileostomy with good output, three drains to gravity (all irrigated per order - sites cleansed and new dressings placed), tolerating diet with good appetite (calorie counts documented).  AM Hgb=6.7, 2units PRBCs transfused without incident, redraw hgb=8.0  Flagged sepsis/lactic=0.4  Scheduled meds given as ordered.  Continue to monitor, treat as ordered, notify team with changes.  PLC drain class scheduled for Tuesday 9/5 1230 (wife notified).

## 2017-09-02 NOTE — PROGRESS NOTES
Surgery Cross-Cover Note  9/2/2017 7:00 AM     Was paged regarding hb <7 on morning labs  Spoke to Dr Dacosta (transplant fellow)  He recommended transfusion of 2 units of blood, to keep Hb >8    Cuca Pedersen MD  PGY-1 General Surgery  Pager # [phone]

## 2017-09-02 NOTE — PROGRESS NOTES
Calorie Counts  Intake recorded for: 9/1  Kcals: 1814  Protein: 87g  # Meals Recorded: 2  meals (First - 100% 4 oz skim milk, scrambled eggs, sausage eliane, Danish toast with butter and syrup)     (Second - 100% toast with butter, 16 oz 1% milk, 50% cream of wheat, cottage cheese, 25% fruit plate)  # Supplements Recorded: 50% BeneProtein packet (was mixed with cream of wheat), 100% 2 Glucernas, 100% 1 Ensure Clear

## 2017-09-02 NOTE — PROVIDER NOTIFICATION
On-call MD paged regarding patients critical hemoglobin level of 6.7.     Will continue to monitor and inform MD about changes in the patients condition.

## 2017-09-02 NOTE — PLAN OF CARE
Problem: Goal Outcome Summary  Goal: Goal Outcome Summary  Outcome: No Change  [id number]: Afebrile. VSS on 1L O2, pt requiring some oxygen when sleeping. Pt complaining of some lower back pain, PRN Flexeril given x1. Pt on 2 gram K diet and calorie counts, no complaints of nausea. BS checks AC and HS. BS check around 2200 was 88, pt given apple juice, BS increased to 104. BS check at 0200 was 131. Double Lumen PICC SL. Pigtail drain on R side of abdomen putting out small amounts of serosanguinous drainage, 20 mL emptied this shift, irrigated with 10 mL. Pigtail drain on L side of abdomen putting out small amounts of serosanguinous drainage,  30 mL emptied this shift, irrigated with 10 mL. Second drain on R side of abdomen putting out moderate amounts of serous drainage, 65 mL emptied this shift, irrigated with 20 mL twice this shift. Ileostomy put out 275 mL soft brown output. Pt voiding adequate amounts, using the urinal at the bedside overnight. Pt up with SBA, pt did walk to the bathroom once this shift otherwise pt has been sleeping in between nursing cares. Call light in reach. Will continue to monitor and follow plan of care.

## 2017-09-02 NOTE — PROGRESS NOTES
Immunosuppression Note:    Camacho Bhagat is a 51 year old male who is seen today  for immunosuppression management     I, Jovan Tran MD, I have examined the patient with the resident/PA/Fellow, discussed and agree with the note and findings.  I have reviewed today's vital signs, medications, labs and imaging. I reviewed the immunosuppression medications and levels. I spoke to the patient/family and explained below clinical details and answered all the questions      Transplant Surgery  Inpatient Daily Progress Note  09/02/2017    Assessment & Plan: Camacho Bhagat is a 52 year old male with history of CASTAÑEDA cirrhosis s/p liver transplant on 3/4/17. Admitted 7/4/17 from [facility] with sepsis secondary to colitis, taken to OR for initial ex-lap with findings of typhlitis in the right colon, wound left open with wound vac in place for re exploration and interval closure on 7/5/17. Concern for CMV colitis with low count CMV viremia, underwent colonoscopy on 7/21/17, biopsies obtained.  On 7/25/17 patient became septic with abdominal compartment syndrome.  CT noted new large heterogeneous right sided retroperitoneal gas and air tracking along duodenum, and patient underwent an ex-lap, right angelique-colectomy, end ileostomy, mucus fistula, partial omentectomy on 7/26/17 by colorectal surgery.  Post-op course complicated by retroperitoneal abscess/peritonitis requiring percutaneous drainage.    Graft Function: Good function. LFTs acceptable (checking every M, Th).   Immunosuppression Management:   CellCept discontinued (6/27/17) due to neutropenia.   Prograf goal, resume goal 6-8. 8/29 level 8.4, 11.5.  Continue 1.5 mg BID. Check level tomorrow.  Cardiorespiratory:   -Suspected RONNIE: Uses O2 overnight only for brief apnea while sleeping per nursing.  -HTN: SBP 120s-160s. Continue amlodipine 10 mg daily. Added HCTZ 25 mg daily for HTN and hyperkalemia. Pt on daily Florinef daily for hyperkalemia.   -R/o HCAP:  New  productive cough on 8/24, no fever, improved.  CXR: retrocardiac opacity.  Already on Augmentin.  MRSA swab negative.  Duonebs and mycomust x 2 days, completed.  Out of bed. Aggressive pulmonary toilet.  Hematology: Pancytopenia present on admission, persists.  Contributing factors include medications and CMV infection.  -Anemia- Hemoglobin 7.4 (7.6). Dropped below 7 8/26 with bloody ileostomy output, scope completed, see below. Blood transfusion on 8/26 (2 units). PLT trending down. Possible due to recent bleed. Med list reviewed. Send HIT assay. ASA for HAT ppx, will stop as pt ~ 6 months. Will not restart Hep ppx. Hb 6.7 today. Will receive two units of PRBC today.  -Leukopenia/neutropenia- WBC 2.7, ANC > 800.  Received GCSF 8/20, 8/22-25.  -Thrombocytopenia-  Plt 76 trending down. Possible due to recent bleeding. Previously dropped with linezolid.  GI:   -Neutropenic colitis on admission. Colonoscopy 7/21. Biopsy: resolving injury secondary to possible drug induced vs infectious.    -Bowel perforation: 7/25 CT scan abd/pelvis-new large heterogeneous right sided retroperitoneal gas and air tracking along duodenum.  No contrast extravasation.  No intraperitoneal air noted. Colorectal surgery performed Ex lap, right angelique-colectomy, end ileostomy, mucus fistula, partial omentectomy on 7/26. Findings no neida perforation, phlegmon/inflammation right colon. Colon pathology suggested colon perforation, negative for malignancy; CMV stain positive.    -Retroperitoneal abscess/ peritonitis: See ID section below.  -Diet:  NJ tube was dislodged, hold off on replacement for a few days per Dr. Tran to monitor patient's intake.  Low K diet with aspiration precautions.  Magen counts.  -High output  ileostomy:  Goal ileostomy output ~ 1200 cc in 24 hrs. Ileostomy output sanguinous liquid with clots 8/26. Appreciate GI recs.  Ileoscopy and EGD 8/27 showed no active bleeding or ulceration, per GI bleeding suspected from  ileostomy.  No plan for capsule endoscopy at this time. Ostomy output brown with no signs of bleeding today. Monitor.  Ostomy output decreased with stopping tube feeding. Change Loperamide, lomotiland fiber discontinued. Ostomy output 925 cc yesterday. Will restart Loperamide once daily.   Fluid/Electrolytes/Renal:   -EJ: on admission, d/t ATN sec to sepsis. Now resolved, Cr 1.  -Hypernatremia: Resolved with free water.    -Hyperkalemia: Neph consulted earlier in hospitalization. 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  On daily bicarb, PRN lasix, florinef, low K diet. Reconsulted nephrology. Recommended HCTZ for HTN and hyperkalemia. Continue Florinef 0.1 mg and sodium bicarbonate. K 4.8. Consider switching HCTZ to lasix if K increased.   Endocrine: DM type 2. Endocrine consulting for glycemic management.  Glargine and NPH stopped as tube feeding stopped. Continue SSI QID and carb coverage with meals.   Infectious disease: Afebrile.  WBC 2.7.   -Retroperitoneal abscess/peritonitis: CT C/A/P 8/9 showed a persistent gas/fluid collection RLQ, peritonitis, some free air but no evidence of contrast extravasation.  8/9 retroperitoneal drain placed, cx + clostridium septicum.  8/11 paracentesis: WBC 5,038, drain cultures + clostridium septicum (day 6, broth only).  8/14 CT scan abd/pelvis: Complex gas-containing fluid collection in the right retroperitoneum decreased slightly in size, moderate ascites with intraperitoneal gas, peritonitis noted, no bowel leak.  8/15 peritoneal drain placement: WBC 15,680, culture negative.  8/23 CT:  Fluid collections slightly larger.  Patient clinically worse with increased AMS.  8/24 IR replaced retroperitoneal drain, placed new 12F LLQ drain.  Patient improved clinically after drains placed.   8/25 fluid culture from LLQ drain, budding yeast, pt doing well. Will monitor. Repeat imaging today to evaluate fluid collections. Plan to stop Augmentin next week (4 weeks on 9/6).   Abx:  zosyn (8/9-8/22),  linezolid (8/9-8/15), switched to Augmentin (8/22-present).   -CMV viremia: Primary CMV infection.  (CMV R-D+) CMV colitis per pathology, peak serum 7/15 4225 IU/ml.   IV Ganciclovir (started 7/20).  CMV count fell to <137. Decreased Ganciclovir to 5mg/kg on 8/18.  Switched to valcyte 900mg daily 8/22, continue until 8/26.  Check CMV quant weekly until 9/24 and then every other week for 2 months. Last check 8/24 CMV not detected. Check next 8/31.  -EBV viremia:  Peak serum 406,697 copies/ml on 7/18.   753 copies/ml on 8/15.  Check quant monthly, due 9/15.  -Cellulitis/dry gangrene:  Right 1st finger, on linezolid (8/19- 8/22), switched to doxycycline x7 days (8/22-8/28). Clinically resolved.  -Reference ID note from 8/28 for final recs.  Infectious disease resolved issues:  -Severe sepsis: On admission, secondary to right colon phlegmon. Meropenem/daptomycin/micafungin tx x 10 days completed on 8/3. S/p right angelique-colectomy.  Path: CMV stain +, perforation of colon noted.   Neuro: Toxic metabolic encephalopathy secondary to infection, prolonged hospital stay.  Improved.  Vascular:  Microvascular ischemic injury in digits (toes, finger) this is most likely due to injury while patient was on pressor therapy with sepsis. Erythema right 1st finger documented and marked, now resolved.   Activity: Deconditioned due to prolong hospital course. Daily PT/OT, encourage pt to be OOB to chair at least TID (patient prefers up to side of bed).   Prophylaxis: DVT-Heparin SQ, hold today due to bleeding.  Disposition: 7A. Plan to discharge to home. Continue with daily PT/OT/OOB as much as possible. Possible d/c early next week.    Medical Decision Making: Medium    PILLO/Fellow/Resident Provider: Eric Dacosta  Fellow, Transplant surgery  Pager: [phone]  Faculty: Jovan Tran MD     __________________________________________________________________  Interval History:   Overnight events:   Appetite poor, though continues to  have poor appetite. Ambulating. Participating in education.       ROS:   A 10-point review of systems was negative except as noted above.    Meds:    loperamide  4 mg Oral BID     lidocaine  1 patch Transdermal Q24h    And     lidocaine   Transdermal Q24H    And     lidocaine   Transdermal Q8H     sodium bicarbonate  650 mg Per NG tube TID     furosemide  20 mg Oral Daily     tacrolimus  1.5 mg Oral BID IS     insulin aspart  1-10 Units Subcutaneous TID AC     insulin aspart  1-7 Units Subcutaneous At Bedtime     amoxicillin-clavulanate  1 tablet Oral Q12H FRANCO     amLODIPine  10 mg Oral Daily     pantoprazole  40 mg Oral Daily     menthol   Transdermal Q8H     insulin aspart   Subcutaneous TID w/meals     artificial saliva  15 mL Swish & Spit 4x Daily     fludrocortisone  0.1 mg Oral Daily     sodium chloride (PF)  10 mL Irrigation Q8H     sodium chloride (PF)  20 mL Irrigation Q6H     miconazole with skin protectant   Topical BID     sodium chloride (PF)  3 mL Intracatheter Q8H       Physical Exam:     Admit Weight: 94.2 kg (207 lb 11.2 oz) (SCALE 2)    Current vitals:   /83  Pulse 91  Temp 98.3  F (36.8  C) (Oral)  Resp 20  Ht 1.829 m (6')  Wt 93.8 kg (206 lb 11.2 oz)  SpO2 94%  BMI 28.03 kg/m2    Vital sign ranges:    Temp:  [98.1  F (36.7  C)-98.9  F (37.2  C)] 98.3  F (36.8  C)  Pulse:  [91-94] 91  Heart Rate:  [81-94] 91  Resp:  [16-20] 20  BP: (144-163)/(76-87) 163/83  SpO2:  [91 %-97 %] 94 %  Patient Vitals for the past 24 hrs:   BP Temp Temp src Pulse Heart Rate Resp SpO2 Weight   09/02/17 0920 163/83 98.3  F (36.8  C) Oral 91 91 20 94 % -   09/02/17 0910 148/79 98.2  F (36.8  C) Oral 94 94 20 97 % -   09/02/17 0900 - - - - - - - 93.8 kg (206 lb 11.2 oz)   09/02/17 0700 149/85 98.4  F (36.9  C) Oral 91 91 18 91 % -   09/02/17 0408 150/87 98.8  F (37.1  C) Oral - 92 18 91 % -   09/01/17 2308 - - - - - - 94 % -   09/01/17 2306 150/76 98.7  F (37.1  C) Oral - 92 18 - -   09/01/17 1942 153/76 98.1   F (36.7  C) Oral - 89 18 92 % -   09/01/17 1520 150/80 98.4  F (36.9  C) Oral - 81 18 94 % -   09/01/17 1224 144/78 98.9  F (37.2  C) Oral 91 91 16 92 % -   09/01/17 1131 - - - - - - - 93.9 kg (207 lb 1.6 oz)   09/01/17 0957 149/80 - - 91 91 - 93 % -     General Appearance: NAD  Skin: normal, warm, dry  Heart: RRR, well perfused  Lungs: Nonlabored resps on RA  Abdomen: soft, rounded, mild tender LQ unchanged. Ileostomy liquid slightly reddish brown no signs of bleeding from stoma.  Mucus fistula covered. Midline incision, c/d/i.  Right abdominal drains: both serosang.  LLQ drain: serosang output.  : No vazquez  Extremities: No edema. R index finger with necrotic blacked finger.  Necrotic blackened areas on right 1st & 2nd toes and left 2nd toe.  Neurologic: A&O x4. No tremor      Data:   CMP    Recent Labs  Lab 09/02/17  0615 09/01/17 0625 08/31/17  0545  08/28/17  0618    135 138  < > 140   POTASSIUM 4.8 4.5 4.8  < > 5.5*   CHLORIDE 102 102 104  < > 108   CO2 25 26 28  < > 25   * 110* 132*  < > 92   BUN 24 22 24  < > 29   CR 1.10 1.02 1.03  < > 0.97   GFRESTIMATED 70 76 76  < > 81   GFRESTBLACK 85 >90 >90  < > >90   JACOB 8.1* 8.1* 7.9*  < > 8.1*   MAG 2.0 1.7 1.8  < > 1.7   PHOS 4.0 4.1 4.8*  < > 4.2   ALBUMIN  --   --  1.8*  --  1.8*   BILITOTAL  --   --  0.5  --  0.6   ALKPHOS  --   --  204*  --  251*   AST  --   --  31  --  44   ALT  --   --  14  --  19   < > = values in this interval not displayed.  CBC    Recent Labs  Lab 09/02/17  0615 09/01/17  0625   HGB 6.7* 7.0*   WBC 3.2* 3.0*   PLT 70* 66*     COAGS  No lab results found in last 7 days.    Invalid input(s): XA   Urinalysis  Recent Labs   Lab Test  08/10/17   1752  08/08/17   1600   06/14/17   1508   04/11/16   1345   COLOR  Yellow  Yellow   < >   --    < >  Yellow   APPEARANCE  Slightly Cloudy  Slightly Cloudy   < >   --    < >  Clear   URINEGLC  Negative  Negative   < >   --    < >  30*   URINEBILI  Small   This is an unconfirmed  screening test result. A positive result may be false.  *  Negative   < >   --    < >  Negative   URINEKETONE  Negative  Negative   < >   --    < >  Negative   SG  1.012  1.010   < >   --    < >  1.016   UBLD  Negative  Negative   < >   --    < >  Small*   URINEPH  5.0  5.0   < >   --    < >  5.0   PROTEIN  30*  30*   < >   --    < >  30*   NITRITE  Negative  Negative   < >   --    < >  Negative   LEUKEST  Negative  Negative   < >   --    < >  Negative   RBCU  0  3*   < >   --    < >  1   WBCU  10*  7*   < >   --    < >  1   UTPG   --    --    --   1.55*   --   0.41*    < > = values in this interval not displayed.

## 2017-09-02 NOTE — PROGRESS NOTES
Immunosuppression Note:    Camacho Bhagat is a 51 year old male who is seen today  for immunosuppression management     I, Jovan Tran MD, I have examined the patient with the resident/PA/Fellow, discussed and agree with the note and findings.  I have reviewed today's vital signs, medications, labs and imaging. I reviewed the immunosuppression medications and levels. I spoke to the patient/family and explained below clinical details and answered all the questions      Transplant Surgery  Inpatient Daily Progress Note  09/02/2017    Assessment & Plan: Camacho Bhagat is a 52 year old male with history of CASTAÑEDA cirrhosis s/p liver transplant on 3/4/17. Admitted 7/4/17 from [facility] with sepsis secondary to colitis, taken to OR for initial ex-lap with findings of typhlitis in the right colon, wound left open with wound vac in place for re exploration and interval closure on 7/5/17. Concern for CMV colitis with low count CMV viremia, underwent colonoscopy on 7/21/17, biopsies obtained.  On 7/25/17 patient became septic with abdominal compartment syndrome.  CT noted new large heterogeneous right sided retroperitoneal gas and air tracking along duodenum, and patient underwent an ex-lap, right angelique-colectomy, end ileostomy, mucus fistula, partial omentectomy on 7/26/17 by colorectal surgery.  Post-op course complicated by retroperitoneal abscess/peritonitis requiring percutaneous drainage.    Graft Function: Good function. LFTs acceptable (checking every M, Th).   Immunosuppression Management:   CellCept discontinued (6/27/17) due to neutropenia.   Prograf goal, resume goal 6-8. 8/29 level 8.4, 11.5.  Continue 1.5 mg BID. Check level awaited. Will monitor and change appropriately.  Cardiorespiratory:   -Suspected RONNIE: Uses O2 overnight only for brief apnea while sleeping per nursing.  -HTN: SBP 120s-160s. Continue amlodipine 10 mg daily. Added HCTZ 25 mg daily for HTN and hyperkalemia. Pt on daily  Florinef daily for hyperkalemia.   -R/o HCAP:  New productive cough on 8/24, no fever, improved.  CXR: retrocardiac opacity.  Already on Augmentin.  MRSA swab negative.  Duonebs and mycomust x 2 days, completed.  Out of bed. Aggressive pulmonary toilet.  Hematology: Pancytopenia present on admission, persists.  Contributing factors include medications and CMV infection.  -Anemia- Hemoglobin 7.4 (7.6). Dropped below 7 8/26 with bloody ileostomy output, scope completed, see below. Blood transfusion on 8/26 (2 units). PLT trending down. Possible due to recent bleed. Med list reviewed. Send HIT assay. ASA for HAT ppx, will stop as pt ~ 6 months. Will not restart Hep ppx. Hb 6.7 today. Will receive two units of PRBC today.  -Leukopenia/neutropenia- WBC 2.7, ANC > 800.  Received GCSF 8/20, 8/22-25.  -Thrombocytopenia-  Plt 76 trending down. Possible due to recent bleeding. Previously dropped with linezolid.  GI:   -Neutropenic colitis on admission. Colonoscopy 7/21. Biopsy: resolving injury secondary to possible drug induced vs infectious.    -Bowel perforation: 7/25 CT scan abd/pelvis-new large heterogeneous right sided retroperitoneal gas and air tracking along duodenum.  No contrast extravasation.  No intraperitoneal air noted. Colorectal surgery performed Ex lap, right angelique-colectomy, end ileostomy, mucus fistula, partial omentectomy on 7/26. Findings no neida perforation, phlegmon/inflammation right colon. Colon pathology suggested colon perforation, negative for malignancy; CMV stain positive.    -Retroperitoneal abscess/ peritonitis: See ID section below.  -Diet:  NJ tube was dislodged, hold off on replacement for a few days per Dr. Tran to monitor patient's intake.  Low K diet with aspiration precautions.  Magen counts.  -High output  ileostomy:  Goal ileostomy output ~ 1200 cc in 24 hrs. Ileostomy output sanguinous liquid with clots 8/26. Appreciate GI recs.  Ileoscopy and EGD 8/27 showed no active bleeding  or ulceration, per GI bleeding suspected from ileostomy.  No plan for capsule endoscopy at this time. Ostomy output brown with no signs of bleeding today. Monitor.  Ostomy output decreased with stopping tube feeding. Change Loperamide, lomotiland fiber discontinued. Ostomy output 925 cc yesterday. Will restart Loperamide once daily.   Fluid/Electrolytes/Renal:   -EJ: on admission, d/t ATN sec to sepsis. Now resolved, Cr 1.  -Hypernatremia: Resolved with free water.    -Hyperkalemia: Neph consulted earlier in hospitalization. 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  On daily bicarb, PRN lasix, florinef, low K diet. Reconsulted nephrology. Recommended HCTZ for HTN and hyperkalemia. Continue Florinef 0.1 mg and sodium bicarbonate. K 4.8. Consider switching HCTZ to lasix if K increased.   Endocrine: DM type 2. Endocrine consulting for glycemic management.  Glargine and NPH stopped as tube feeding stopped. Continue SSI QID and carb coverage with meals.   Infectious disease: Afebrile.  WBC 2.7.   -Retroperitoneal abscess/peritonitis: CT C/A/P 8/9 showed a persistent gas/fluid collection RLQ, peritonitis, some free air but no evidence of contrast extravasation.  8/9 retroperitoneal drain placed, cx + clostridium septicum.  8/11 paracentesis: WBC 5,038, drain cultures + clostridium septicum (day 6, broth only).  8/14 CT scan abd/pelvis: Complex gas-containing fluid collection in the right retroperitoneum decreased slightly in size, moderate ascites with intraperitoneal gas, peritonitis noted, no bowel leak.  8/15 peritoneal drain placement: WBC 15,680, culture negative.  8/23 CT:  Fluid collections slightly larger.  Patient clinically worse with increased AMS.  8/24 IR replaced retroperitoneal drain, placed new 12F LLQ drain.  Patient improved clinically after drains placed.   8/25 fluid culture from LLQ drain, budding yeast, pt doing well. Will monitor. Repeat imaging today to evaluate fluid collections. Plan to stop Augmentin next week (4  weeks on 9/6).   Abx:  zosyn (8/9-8/22), linezolid (8/9-8/15), switched to Augmentin (8/22-present).   -CMV viremia: Primary CMV infection.  (CMV R-D+) CMV colitis per pathology, peak serum 7/15 4225 IU/ml.   IV Ganciclovir (started 7/20).  CMV count fell to <137. Decreased Ganciclovir to 5mg/kg on 8/18.  Switched to valcyte 900mg daily 8/22, continue until 8/26.  Check CMV quant weekly until 9/24 and then every other week for 2 months. Last check 8/24 CMV not detected. Check next 8/31.  -EBV viremia:  Peak serum 406,697 copies/ml on 7/18.   753 copies/ml on 8/15.  Check quant monthly, due 9/15.  -Cellulitis/dry gangrene:  Right 1st finger, on linezolid (8/19- 8/22), switched to doxycycline x7 days (8/22-8/28). Clinically resolved.  -Reference ID note from 8/28 for final recs.  Infectious disease resolved issues:  -Severe sepsis: On admission, secondary to right colon phlegmon. Meropenem/daptomycin/micafungin tx x 10 days completed on 8/3. S/p right angelique-colectomy.  Path: CMV stain +, perforation of colon noted.   Neuro: Toxic metabolic encephalopathy secondary to infection, prolonged hospital stay.  Improved.  Vascular:  Microvascular ischemic injury in digits (toes, finger) this is most likely due to injury while patient was on pressor therapy with sepsis. Erythema right 1st finger documented and marked, now resolved.   Activity: Deconditioned due to prolong hospital course. Daily PT/OT, encourage pt to be OOB to chair at least TID (patient prefers up to side of bed).   Prophylaxis: DVT-Heparin SQ, hold today due to bleeding.  Disposition: 7A. Plan to discharge to home. Continue with daily PT/OT/OOB as much as possible. Possible d/c early next week.    Medical Decision Making: Medium    PILLO/Fellow/Resident Provider: Eric Dacosta  Fellow, Transplant surgery  Pager: [phone]  Faculty: Jovan Chinnakotla, MD     __________________________________________________________________  Interval History:   Overnight  events:   Appetite poor, though continues to have poor appetite. Ambulating. Participating in education.       ROS:   A 10-point review of systems was negative except as noted above.    Meds:    loperamide  4 mg Oral BID     lidocaine  1 patch Transdermal Q24h    And     lidocaine   Transdermal Q24H    And     lidocaine   Transdermal Q8H     sodium bicarbonate  650 mg Per NG tube TID     furosemide  20 mg Oral Daily     tacrolimus  1.5 mg Oral BID IS     insulin aspart  1-10 Units Subcutaneous TID AC     insulin aspart  1-7 Units Subcutaneous At Bedtime     amoxicillin-clavulanate  1 tablet Oral Q12H FRANCO     amLODIPine  10 mg Oral Daily     pantoprazole  40 mg Oral Daily     menthol   Transdermal Q8H     insulin aspart   Subcutaneous TID w/meals     artificial saliva  15 mL Swish & Spit 4x Daily     fludrocortisone  0.1 mg Oral Daily     sodium chloride (PF)  10 mL Irrigation Q8H     sodium chloride (PF)  20 mL Irrigation Q6H     miconazole with skin protectant   Topical BID     sodium chloride (PF)  3 mL Intracatheter Q8H       Physical Exam:     Admit Weight: 94.2 kg (207 lb 11.2 oz) (SCALE 2)    Current vitals:   /83  Pulse 91  Temp 98.3  F (36.8  C) (Oral)  Resp 20  Ht 1.829 m (6')  Wt 93.8 kg (206 lb 11.2 oz)  SpO2 94%  BMI 28.03 kg/m2    Vital sign ranges:    Temp:  [98.1  F (36.7  C)-98.9  F (37.2  C)] 98.3  F (36.8  C)  Pulse:  [91-94] 91  Heart Rate:  [81-94] 91  Resp:  [16-20] 20  BP: (144-163)/(76-87) 163/83  SpO2:  [91 %-97 %] 94 %  Patient Vitals for the past 24 hrs:   BP Temp Temp src Pulse Heart Rate Resp SpO2 Weight   09/02/17 0920 163/83 98.3  F (36.8  C) Oral 91 91 20 94 % -   09/02/17 0910 148/79 98.2  F (36.8  C) Oral 94 94 20 97 % -   09/02/17 0900 - - - - - - - 93.8 kg (206 lb 11.2 oz)   09/02/17 0700 149/85 98.4  F (36.9  C) Oral 91 91 18 91 % -   09/02/17 0408 150/87 98.8  F (37.1  C) Oral - 92 18 91 % -   09/01/17 2308 - - - - - - 94 % -   09/01/17 2306 150/76 98.7  F (37.1  C)  Oral - 92 18 - -   09/01/17 1942 153/76 98.1  F (36.7  C) Oral - 89 18 92 % -   09/01/17 1520 150/80 98.4  F (36.9  C) Oral - 81 18 94 % -   09/01/17 1224 144/78 98.9  F (37.2  C) Oral 91 91 16 92 % -   09/01/17 1131 - - - - - - - 93.9 kg (207 lb 1.6 oz)   09/01/17 0957 149/80 - - 91 91 - 93 % -     General Appearance: NAD  Skin: normal, warm, dry  Heart: RRR, well perfused  Lungs: Nonlabored resps on RA  Abdomen: soft, rounded, mild tender LQ unchanged. Ileostomy liquid slightly reddish brown no signs of bleeding from stoma.  Mucus fistula covered. Midline incision, c/d/i.  Right abdominal drains: both serosang.  LLQ drain: serosang output.  : No vazquez  Extremities: No edema. R index finger with necrotic blacked finger.  Necrotic blackened areas on right 1st & 2nd toes and left 2nd toe.  Neurologic: A&O x4. No tremor      Data:   CMP    Recent Labs  Lab 09/02/17  0615 09/01/17  0625 08/31/17  0545  08/28/17  0618    135 138  < > 140   POTASSIUM 4.8 4.5 4.8  < > 5.5*   CHLORIDE 102 102 104  < > 108   CO2 25 26 28  < > 25   * 110* 132*  < > 92   BUN 24 22 24  < > 29   CR 1.10 1.02 1.03  < > 0.97   GFRESTIMATED 70 76 76  < > 81   GFRESTBLACK 85 >90 >90  < > >90   JACOB 8.1* 8.1* 7.9*  < > 8.1*   MAG 2.0 1.7 1.8  < > 1.7   PHOS 4.0 4.1 4.8*  < > 4.2   ALBUMIN  --   --  1.8*  --  1.8*   BILITOTAL  --   --  0.5  --  0.6   ALKPHOS  --   --  204*  --  251*   AST  --   --  31  --  44   ALT  --   --  14  --  19   < > = values in this interval not displayed.  CBC    Recent Labs  Lab 09/02/17  0615 09/01/17  0625   HGB 6.7* 7.0*   WBC 3.2* 3.0*   PLT 70* 66*     COAGS  No lab results found in last 7 days.    Invalid input(s): XA   Urinalysis  Recent Labs   Lab Test  08/10/17   1752  08/08/17   1600   06/14/17   1508   04/11/16   1345   COLOR  Yellow  Yellow   < >   --    < >  Yellow   APPEARANCE  Slightly Cloudy  Slightly Cloudy   < >   --    < >  Clear   URINEGLC  Negative  Negative   < >   --    < >  30*    URINEBILI  Small   This is an unconfirmed screening test result. A positive result may be false.  *  Negative   < >   --    < >  Negative   URINEKETONE  Negative  Negative   < >   --    < >  Negative   SG  1.012  1.010   < >   --    < >  1.016   UBLD  Negative  Negative   < >   --    < >  Small*   URINEPH  5.0  5.0   < >   --    < >  5.0   PROTEIN  30*  30*   < >   --    < >  30*   NITRITE  Negative  Negative   < >   --    < >  Negative   LEUKEST  Negative  Negative   < >   --    < >  Negative   RBCU  0  3*   < >   --    < >  1   WBCU  10*  7*   < >   --    < >  1   UTPG   --    --    --   1.55*   --   0.41*    < > = values in this interval not displayed.

## 2017-09-03 LAB
ANION GAP SERPL CALCULATED.3IONS-SCNC: 5 MMOL/L (ref 3–14)
BASOPHILS # BLD AUTO: 0 10E9/L (ref 0–0.2)
BASOPHILS NFR BLD AUTO: 0.8 %
BUN SERPL-MCNC: 23 MG/DL (ref 7–30)
CALCIUM SERPL-MCNC: 8.1 MG/DL (ref 8.5–10.1)
CHLORIDE SERPL-SCNC: 104 MMOL/L (ref 94–109)
CO2 SERPL-SCNC: 26 MMOL/L (ref 20–32)
CREAT SERPL-MCNC: 1.14 MG/DL (ref 0.66–1.25)
DIFFERENTIAL METHOD BLD: ABNORMAL
EOSINOPHIL # BLD AUTO: 0 10E9/L (ref 0–0.7)
EOSINOPHIL NFR BLD AUTO: 0.8 %
ERYTHROCYTE [DISTWIDTH] IN BLOOD BY AUTOMATED COUNT: 20.3 % (ref 10–15)
GFR SERPL CREATININE-BSD FRML MDRD: 67 ML/MIN/1.7M2
GLUCOSE BLDC GLUCOMTR-MCNC: 122 MG/DL (ref 70–99)
GLUCOSE BLDC GLUCOMTR-MCNC: 130 MG/DL (ref 70–99)
GLUCOSE BLDC GLUCOMTR-MCNC: 136 MG/DL (ref 70–99)
GLUCOSE BLDC GLUCOMTR-MCNC: 162 MG/DL (ref 70–99)
GLUCOSE BLDC GLUCOMTR-MCNC: 210 MG/DL (ref 70–99)
GLUCOSE SERPL-MCNC: 207 MG/DL (ref 70–99)
HCT VFR BLD AUTO: 23.7 % (ref 40–53)
HGB BLD-MCNC: 8.1 G/DL (ref 13.3–17.7)
IMM GRANULOCYTES # BLD: 0.1 10E9/L (ref 0–0.4)
IMM GRANULOCYTES NFR BLD: 3 %
LYMPHOCYTES # BLD AUTO: 0.8 10E9/L (ref 0.8–5.3)
LYMPHOCYTES NFR BLD AUTO: 19.3 %
MAGNESIUM SERPL-MCNC: 1.8 MG/DL (ref 1.6–2.3)
MCH RBC QN AUTO: 31 PG (ref 26.5–33)
MCHC RBC AUTO-ENTMCNC: 34.2 G/DL (ref 31.5–36.5)
MCV RBC AUTO: 91 FL (ref 78–100)
MONOCYTES # BLD AUTO: 0.5 10E9/L (ref 0–1.3)
MONOCYTES NFR BLD AUTO: 13.3 %
NEUTROPHILS # BLD AUTO: 2.5 10E9/L (ref 1.6–8.3)
NEUTROPHILS NFR BLD AUTO: 62.8 %
NRBC # BLD AUTO: 0 10*3/UL
NRBC BLD AUTO-RTO: 0 /100
PHOSPHATE SERPL-MCNC: 4.2 MG/DL (ref 2.5–4.5)
PLATELET # BLD AUTO: 68 10E9/L (ref 150–450)
POTASSIUM SERPL-SCNC: 4.9 MMOL/L (ref 3.4–5.3)
RBC # BLD AUTO: 2.61 10E12/L (ref 4.4–5.9)
SODIUM SERPL-SCNC: 135 MMOL/L (ref 133–144)
TACROLIMUS BLD-MCNC: <3 UG/L (ref 5–15)
TME LAST DOSE: ABNORMAL H
WBC # BLD AUTO: 4 10E9/L (ref 4–11)

## 2017-09-03 PROCEDURE — 36415 COLL VENOUS BLD VENIPUNCTURE: CPT | Performed by: STUDENT IN AN ORGANIZED HEALTH CARE EDUCATION/TRAINING PROGRAM

## 2017-09-03 PROCEDURE — 83735 ASSAY OF MAGNESIUM: CPT | Performed by: STUDENT IN AN ORGANIZED HEALTH CARE EDUCATION/TRAINING PROGRAM

## 2017-09-03 PROCEDURE — 25000131 ZZH RX MED GY IP 250 OP 636 PS 637: Performed by: TRANSPLANT SURGERY

## 2017-09-03 PROCEDURE — 40000802 ZZH SITE CHECK

## 2017-09-03 PROCEDURE — 85025 COMPLETE CBC W/AUTO DIFF WBC: CPT | Performed by: STUDENT IN AN ORGANIZED HEALTH CARE EDUCATION/TRAINING PROGRAM

## 2017-09-03 PROCEDURE — 84100 ASSAY OF PHOSPHORUS: CPT | Performed by: STUDENT IN AN ORGANIZED HEALTH CARE EDUCATION/TRAINING PROGRAM

## 2017-09-03 PROCEDURE — 80197 ASSAY OF TACROLIMUS: CPT | Performed by: STUDENT IN AN ORGANIZED HEALTH CARE EDUCATION/TRAINING PROGRAM

## 2017-09-03 PROCEDURE — 25000132 ZZH RX MED GY IP 250 OP 250 PS 637: Performed by: TRANSPLANT SURGERY

## 2017-09-03 PROCEDURE — 12000026 ZZH R&B TRANSPLANT

## 2017-09-03 PROCEDURE — 25000131 ZZH RX MED GY IP 250 OP 636 PS 637: Performed by: PHYSICIAN ASSISTANT

## 2017-09-03 PROCEDURE — 25000132 ZZH RX MED GY IP 250 OP 250 PS 637: Performed by: PHYSICIAN ASSISTANT

## 2017-09-03 PROCEDURE — 80048 BASIC METABOLIC PNL TOTAL CA: CPT | Performed by: STUDENT IN AN ORGANIZED HEALTH CARE EDUCATION/TRAINING PROGRAM

## 2017-09-03 PROCEDURE — 00000146 ZZHCL STATISTIC GLUCOSE BY METER IP

## 2017-09-03 RX ORDER — TACROLIMUS 1 MG/1
1 CAPSULE ORAL ONCE
Status: COMPLETED | OUTPATIENT
Start: 2017-09-03 | End: 2017-09-03

## 2017-09-03 RX ORDER — TACROLIMUS 1 MG/1
3 CAPSULE ORAL
Status: DISCONTINUED | OUTPATIENT
Start: 2017-09-03 | End: 2017-09-04

## 2017-09-03 RX ADMIN — Medication 15 ML: at 11:44

## 2017-09-03 RX ADMIN — FUROSEMIDE 20 MG: 20 TABLET ORAL at 08:38

## 2017-09-03 RX ADMIN — TACROLIMUS 1 MG: 1 CAPSULE ORAL at 17:46

## 2017-09-03 RX ADMIN — SODIUM BICARBONATE 650 MG TABLET 650 MG: at 19:57

## 2017-09-03 RX ADMIN — SODIUM BICARBONATE 650 MG TABLET 650 MG: at 08:38

## 2017-09-03 RX ADMIN — TACROLIMUS 3 MG: 1 CAPSULE ORAL at 17:46

## 2017-09-03 RX ADMIN — TACROLIMUS 1.5 MG: 1 CAPSULE ORAL at 08:38

## 2017-09-03 RX ADMIN — Medication 15 ML: at 20:00

## 2017-09-03 RX ADMIN — Medication 15 ML: at 17:25

## 2017-09-03 RX ADMIN — PANTOPRAZOLE SODIUM 40 MG: 20 TABLET, DELAYED RELEASE ORAL at 08:38

## 2017-09-03 RX ADMIN — AMOXICILLIN AND CLAVULANATE POTASSIUM 1 TABLET: 875; 125 TABLET, FILM COATED ORAL at 19:57

## 2017-09-03 RX ADMIN — AMLODIPINE BESYLATE 10 MG: 10 TABLET ORAL at 08:38

## 2017-09-03 RX ADMIN — Medication 15 ML: at 08:39

## 2017-09-03 RX ADMIN — FLUDROCORTISONE ACETATE 0.1 MG: 0.1 TABLET ORAL at 08:38

## 2017-09-03 RX ADMIN — AMOXICILLIN AND CLAVULANATE POTASSIUM 1 TABLET: 875; 125 TABLET, FILM COATED ORAL at 08:38

## 2017-09-03 RX ADMIN — SODIUM BICARBONATE 650 MG TABLET 650 MG: at 13:03

## 2017-09-03 RX ADMIN — LOPERAMIDE HYDROCHLORIDE 4 MG: 2 CAPSULE ORAL at 19:57

## 2017-09-03 RX ADMIN — INSULIN ASPART 1 UNITS: 100 INJECTION, SOLUTION INTRAVENOUS; SUBCUTANEOUS at 08:39

## 2017-09-03 NOTE — PLAN OF CARE
Problem: Individualization  Goal: Patient Preferences     AVSS and denies pain  Blood [id number]=[id number], 136  Patient disoriented to time and intermittently making odd statements.  Bed alarm on for patient safety.  Patient up with standby assist, voiding, ileostomy with good output (bag changed this AM), three drains to gravity (all irrigated per order - sites cleansed and new dressings placed), tolerating diet with good appetite (calorie counts documented).  Scheduled meds given as ordered.  Continue to monitor, treat as ordered, notify team with changes.  PLC drain class scheduled for Tuesday 9/5 1230 (wife and mother aware).          ADDENDUM:  Team to reassessment plan for patient.  Wife and mother concerned he should not be discharged to home rather to TCU first.

## 2017-09-03 NOTE — PROGRESS NOTES
Calorie Counts  Intake recorded for: 9/2  Kcals: 1497  Protein: 66g  # Meals Recorded: 2 meals (First -100% scrambled eggs & hash browns w/ cheese, whole milk, coffee, english muffin w/ butter & jelly)    (Second - 80% flatbread pizza w/ pepperoni & sausage, 50% angie food cake w/ strawberries, 25% diet lemonade)  # Supplements Recorded: 100& 1 Glucerna

## 2017-09-03 NOTE — PLAN OF CARE
Problem: Individualization  Goal: Patient Preferences  Outcome: No Change  Pt. hypertensive, AOVSS.  Pt. denies pain or nausea.  Pt. with intermittent confusion & disoriented to time, place, & situation.  Bed alarm on for pt's safety. PICC saline locked.  Pt. voiding spontaneously.  Ileostomy with soft brown stool.  3 drains patent & irrigated per orders.  Pt. slept on & off between cares. Continue to monitor & treat per POC. Notify team with issues.

## 2017-09-03 NOTE — PROGRESS NOTES
Immunosuppression Note:    Camacho Bhagat is a 51 year old male who is seen today  for immunosuppression management     I, Jovan Tran MD, I have examined the patient with the resident/PA/Fellow, discussed and agree with the note and findings.  I have reviewed today's vital signs, medications, labs and imaging. I reviewed the immunosuppression medications and levels. I spoke to the patient/family and explained below clinical details and answered all the questions      Transplant Surgery  Inpatient Daily Progress Note  09/03/2017    Assessment & Plan: Camacho Bhagat is a 52 year old male with history of CASTAÑEDA cirrhosis s/p liver transplant on 3/4/17. Admitted 7/4/17 from [facility] with sepsis secondary to colitis, taken to OR for initial ex-lap with findings of typhlitis in the right colon, wound left open with wound vac in place for re exploration and interval closure on 7/5/17. Concern for CMV colitis with low count CMV viremia, underwent colonoscopy on 7/21/17, biopsies obtained.  On 7/25/17 patient became septic with abdominal compartment syndrome.  CT noted new large heterogeneous right sided retroperitoneal gas and air tracking along duodenum, and patient underwent an ex-lap, right angelique-colectomy, end ileostomy, mucus fistula, partial omentectomy on 7/26/17 by colorectal surgery.  Post-op course complicated by retroperitoneal abscess/peritonitis requiring percutaneous drainage.    Graft Function: Good function. LFTs acceptable (checking every M, Th).   Immunosuppression Management:   CellCept discontinued (6/27/17) due to neutropenia.   Prograf goal, resume goal 6-8. 8/29 level 8.4, 11.5.  Continue 1.5 mg BID. Level yesterday was less than 3. Gave 1mg stat and continued 1.5mg bid. Will check levels today and adjust doses.  Cardiorespiratory:   -Suspected RONNIE: Uses O2 overnight only for brief apnea while sleeping per nursing.  -HTN: SBP 120s-160s. Continue amlodipine 10 mg daily. Added HCTZ 25  mg daily for HTN and hyperkalemia. Pt on daily Florinef daily for hyperkalemia.   -R/o HCAP:  New productive cough on 8/24, no fever, improved.  CXR: retrocardiac opacity.  Already on Augmentin.  MRSA swab negative.  Duonebs and mycomust x 2 days, completed.  Out of bed. Aggressive pulmonary toilet.  Hematology: Pancytopenia present on admission, persists.  Contributing factors include medications and CMV infection.  -Anemia- Hemoglobin 7.4 (7.6). Dropped below 7 8/26 with bloody ileostomy output, scope completed, see below. Blood transfusion on 8/26 (2 units). PLT trending down. Possible due to recent bleed. Med list reviewed. Send HIT assay. ASA for HAT ppx, will stop as pt ~ 6 months. Will not restart Hep ppx. Hb 6.7 today. Received 2 U PRBC yesterday. Hb 8.1 today.   -Leukopenia/neutropenia- WBC 2.7, ANC > 800.  Received GCSF 8/20, 8/22-25.  -Thrombocytopenia-  Plt 76 trending down. Possible due to recent bleeding. Previously dropped with linezolid.  GI:   -Neutropenic colitis on admission. Colonoscopy 7/21. Biopsy: resolving injury secondary to possible drug induced vs infectious.    -Bowel perforation: 7/25 CT scan abd/pelvis-new large heterogeneous right sided retroperitoneal gas and air tracking along duodenum.  No contrast extravasation.  No intraperitoneal air noted. Colorectal surgery performed Ex lap, right angelique-colectomy, end ileostomy, mucus fistula, partial omentectomy on 7/26. Findings no neida perforation, phlegmon/inflammation right colon. Colon pathology suggested colon perforation, negative for malignancy; CMV stain positive.    -Retroperitoneal abscess/ peritonitis: See ID section below.  -Diet:  NJ tube was dislodged, hold off on replacement for a few days per Dr. Tran to monitor patient's intake.  Low K diet with aspiration precautions.  Magen counts.  -High output  ileostomy:  Goal ileostomy output ~ 1200 cc in 24 hrs. Ileostomy output sanguinous liquid with clots 8/26. Appreciate GI  recs.  Ileoscopy and EGD 8/27 showed no active bleeding or ulceration, per GI bleeding suspected from ileostomy.  No plan for capsule endoscopy at this time. Ostomy output brown with no signs of bleeding today. Monitor.  Ostomy output decreased with stopping tube feeding. Change Loperamide, lomotiland fiber discontinued. Ostomy output 925 cc yesterday. Will restart Loperamide once daily.   Fluid/Electrolytes/Renal:   -EJ: on admission, d/t ATN sec to sepsis. Now resolved, Cr 1.  -Hypernatremia: Resolved with free water.    -Hyperkalemia: Neph consulted earlier in hospitalization. 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  On daily bicarb, PRN lasix, florinef, low K diet. Reconsulted nephrology. Recommended HCTZ for HTN and hyperkalemia. Continue Florinef 0.1 mg and sodium bicarbonate. K 4.8. Consider switching HCTZ to lasix if K increased.   Endocrine: DM type 2. Endocrine consulting for glycemic management.  Glargine and NPH stopped as tube feeding stopped. Continue SSI QID and carb coverage with meals.   Infectious disease: Afebrile.  WBC 2.7.   -Retroperitoneal abscess/peritonitis: CT C/A/P 8/9 showed a persistent gas/fluid collection RLQ, peritonitis, some free air but no evidence of contrast extravasation.  8/9 retroperitoneal drain placed, cx + clostridium septicum.  8/11 paracentesis: WBC 5,038, drain cultures + clostridium septicum (day 6, broth only).  8/14 CT scan abd/pelvis: Complex gas-containing fluid collection in the right retroperitoneum decreased slightly in size, moderate ascites with intraperitoneal gas, peritonitis noted, no bowel leak.  8/15 peritoneal drain placement: WBC 15,680, culture negative.  8/23 CT:  Fluid collections slightly larger.  Patient clinically worse with increased AMS.  8/24 IR replaced retroperitoneal drain, placed new 12F LLQ drain.  Patient improved clinically after drains placed.   8/25 fluid culture from LLQ drain, budding yeast, pt doing well. Will monitor. Repeat imaging today to evaluate  fluid collections. Plan to stop Augmentin next week (4 weeks on 9/6).   Abx:  zosyn (8/9-8/22), linezolid (8/9-8/15), switched to Augmentin (8/22-present).   -CMV viremia: Primary CMV infection.  (CMV R-D+) CMV colitis per pathology, peak serum 7/15 4225 IU/ml.   IV Ganciclovir (started 7/20).  CMV count fell to <137. Decreased Ganciclovir to 5mg/kg on 8/18.  Switched to valcyte 900mg daily 8/22, continue until 8/26.  Check CMV quant weekly until 9/24 and then every other week for 2 months. Last check 8/24 CMV not detected. Check next 8/31.  -EBV viremia:  Peak serum 406,697 copies/ml on 7/18.   753 copies/ml on 8/15.  Check quant monthly, due 9/15.  -Cellulitis/dry gangrene:  Right 1st finger, on linezolid (8/19- 8/22), switched to doxycycline x7 days (8/22-8/28). Clinically resolved.  -Reference ID note from 8/28 for final recs.  Infectious disease resolved issues:  -Severe sepsis: On admission, secondary to right colon phlegmon. Meropenem/daptomycin/micafungin tx x 10 days completed on 8/3. S/p right angelique-colectomy.  Path: CMV stain +, perforation of colon noted.   Neuro: Toxic metabolic encephalopathy secondary to infection, prolonged hospital stay.  Improved.  Vascular:  Microvascular ischemic injury in digits (toes, finger) this is most likely due to injury while patient was on pressor therapy with sepsis. Erythema right 1st finger documented and marked, now resolved.   Activity: Deconditioned due to prolong hospital course. Daily PT/OT, encourage pt to be OOB to chair at least TID (patient prefers up to side of bed).   Prophylaxis: DVT-Heparin SQ, hold today due to bleeding.  Disposition: 7A. Plan to discharge to home. Continue with daily PT/OT/OOB as much as possible. Possible d/c early next week.    Medical Decision Making: Medium    PILLO/Fellow/Resident Provider: Eric Dacosta  Fellow, Transplant surgery  Pager: [phone]  Faculty: Jovan Tran MD      __________________________________________________________________  Interval History:   Overnight events:   Appetite poor, though continues to have poor appetite. Ambulating. Participating in education.       ROS:   A 10-point review of systems was negative except as noted above.    Meds:    alteplase  1 mg Intravenous Once     alteplase  1 mg Intravenous Once     loperamide  4 mg Oral BID     lidocaine  1 patch Transdermal Q24h    And     lidocaine   Transdermal Q24H    And     lidocaine   Transdermal Q8H     sodium bicarbonate  650 mg Per NG tube TID     furosemide  20 mg Oral Daily     tacrolimus  1.5 mg Oral BID IS     insulin aspart  1-10 Units Subcutaneous TID AC     insulin aspart  1-7 Units Subcutaneous At Bedtime     amoxicillin-clavulanate  1 tablet Oral Q12H FRANCO     amLODIPine  10 mg Oral Daily     pantoprazole  40 mg Oral Daily     menthol   Transdermal Q8H     insulin aspart   Subcutaneous TID w/meals     artificial saliva  15 mL Swish & Spit 4x Daily     fludrocortisone  0.1 mg Oral Daily     sodium chloride (PF)  10 mL Irrigation Q8H     sodium chloride (PF)  20 mL Irrigation Q6H     miconazole with skin protectant   Topical BID     sodium chloride (PF)  3 mL Intracatheter Q8H       Physical Exam:     Admit Weight: 94.2 kg (207 lb 11.2 oz) (SCALE 2)    Current vitals:   /87  Pulse 93  Temp 98  F (36.7  C)  Resp 20  Ht 1.829 m (6')  Wt 93.8 kg (206 lb 11.2 oz)  SpO2 93%  BMI 28.03 kg/m2    Vital sign ranges:    Temp:  [97.3  F (36.3  C)-98.8  F (37.1  C)] 98  F (36.7  C)  Pulse:  [89-94] 93  Heart Rate:  [86-94] 86  Resp:  [16-28] 20  BP: (140-176)/(75-92) 153/87  SpO2:  [91 %-97 %] 93 %  Patient Vitals for the past 24 hrs:   BP Temp Temp src Pulse Heart Rate Resp SpO2 Weight   09/03/17 0700 153/87 98  F (36.7  C) - - 86 20 93 % -   09/03/17 0332 163/88 98.1  F (36.7  C) Oral - 91 20 92 % -   09/03/17 0016 153/82 - - - 89 - - -   09/02/17 2325 176/84 98.2  F (36.8  C) Oral 93 93 20 91  % -   09/02/17 2045 142/75 98.4  F (36.9  C) Oral 93 93 20 97 % -   09/02/17 1601 152/86 97.3  F (36.3  C) Oral 91 91 28 92 % -   09/02/17 1240 (!) 157/92 98.4  F (36.9  C) Oral 94 94 16 95 % -   09/02/17 1150 146/82 98.4  F (36.9  C) Oral 93 93 20 91 % -   09/02/17 1100 146/85 98.7  F (37.1  C) Oral - 92 20 91 % -   09/02/17 1050 140/78 98.7  F (37.1  C) - 89 - 16 92 % -   09/02/17 1010 143/83 98.8  F (37.1  C) Oral 93 93 20 95 % -   09/02/17 0920 163/83 98.3  F (36.8  C) Oral 91 91 20 94 % -   09/02/17 0910 148/79 98.2  F (36.8  C) Oral 94 94 20 97 % -   09/02/17 0900 - - - - - - - 93.8 kg (206 lb 11.2 oz)     General Appearance: NAD  Skin: normal, warm, dry  Heart: RRR, well perfused  Lungs: Nonlabored resps on RA  Abdomen: soft, rounded, mild tender LQ unchanged. Ileostomy liquid slightly reddish brown no signs of bleeding from stoma.  Mucus fistula covered. Midline incision, c/d/i.  Right abdominal drains: both serosang.  LLQ drain: serosang output.  : No vazquez  Extremities: No edema. R index finger with necrotic blacked finger.  Necrotic blackened areas on right 1st & 2nd toes and left 2nd toe.  Neurologic: A&O x4. No tremor      Data:   CMP    Recent Labs  Lab 09/03/17  0700 09/02/17  0615  08/31/17  0545  08/28/17  0618    135  < > 138  < > 140   POTASSIUM 4.9 4.8  < > 4.8  < > 5.5*   CHLORIDE 104 102  < > 104  < > 108   CO2 26 25  < > 28  < > 25   * 147*  < > 132*  < > 92   BUN 23 24  < > 24  < > 29   CR 1.14 1.10  < > 1.03  < > 0.97   GFRESTIMATED 67 70  < > 76  < > 81   GFRESTBLACK 81 85  < > >90  < > >90   JACOB 8.1* 8.1*  < > 7.9*  < > 8.1*   MAG 1.8 2.0  < > 1.8  < > 1.7   PHOS 4.2 4.0  < > 4.8*  < > 4.2   ALBUMIN  --   --   --  1.8*  --  1.8*   BILITOTAL  --   --   --  0.5  --  0.6   ALKPHOS  --   --   --  204*  --  251*   AST  --   --   --  31  --  44   ALT  --   --   --  14  --  19   < > = values in this interval not displayed.  CBC    Recent Labs  Lab 09/03/17  0700 09/02/17  1949    HGB 8.1* 8.0*   WBC 4.0 3.7*   PLT 68* 73*     COAGS  No lab results found in last 7 days.    Invalid input(s): XA   Urinalysis  Recent Labs   Lab Test  08/10/17   1752  08/08/17   1600   06/14/17   1508   04/11/16   1345   COLOR  Yellow  Yellow   < >   --    < >  Yellow   APPEARANCE  Slightly Cloudy  Slightly Cloudy   < >   --    < >  Clear   URINEGLC  Negative  Negative   < >   --    < >  30*   URINEBILI  Small   This is an unconfirmed screening test result. A positive result may be false.  *  Negative   < >   --    < >  Negative   URINEKETONE  Negative  Negative   < >   --    < >  Negative   SG  1.012  1.010   < >   --    < >  1.016   UBLD  Negative  Negative   < >   --    < >  Small*   URINEPH  5.0  5.0   < >   --    < >  5.0   PROTEIN  30*  30*   < >   --    < >  30*   NITRITE  Negative  Negative   < >   --    < >  Negative   LEUKEST  Negative  Negative   < >   --    < >  Negative   RBCU  0  3*   < >   --    < >  1   WBCU  10*  7*   < >   --    < >  1   UTPG   --    --    --   1.55*   --   0.41*    < > = values in this interval not displayed.

## 2017-09-04 ENCOUNTER — APPOINTMENT (OUTPATIENT)
Dept: OCCUPATIONAL THERAPY | Facility: CLINIC | Age: 52
End: 2017-09-04
Attending: TRANSPLANT SURGERY
Payer: COMMERCIAL

## 2017-09-04 ENCOUNTER — APPOINTMENT (OUTPATIENT)
Dept: PHYSICAL THERAPY | Facility: CLINIC | Age: 52
End: 2017-09-04
Attending: TRANSPLANT SURGERY
Payer: COMMERCIAL

## 2017-09-04 LAB
ALBUMIN SERPL-MCNC: 2.1 G/DL (ref 3.4–5)
ALP SERPL-CCNC: 213 U/L (ref 40–150)
ALT SERPL W P-5'-P-CCNC: 15 U/L (ref 0–70)
ANION GAP SERPL CALCULATED.3IONS-SCNC: 8 MMOL/L (ref 3–14)
AST SERPL W P-5'-P-CCNC: 43 U/L (ref 0–45)
BASOPHILS # BLD AUTO: 0 10E9/L (ref 0–0.2)
BASOPHILS NFR BLD AUTO: 0.2 %
BILIRUB DIRECT SERPL-MCNC: 0.2 MG/DL (ref 0–0.2)
BILIRUB SERPL-MCNC: 0.8 MG/DL (ref 0.2–1.3)
BUN SERPL-MCNC: 24 MG/DL (ref 7–30)
CALCIUM SERPL-MCNC: 7.7 MG/DL (ref 8.5–10.1)
CHLORIDE SERPL-SCNC: 100 MMOL/L (ref 94–109)
CO2 SERPL-SCNC: 24 MMOL/L (ref 20–32)
CREAT SERPL-MCNC: 1.18 MG/DL (ref 0.66–1.25)
DIFFERENTIAL METHOD BLD: ABNORMAL
EOSINOPHIL # BLD AUTO: 0.1 10E9/L (ref 0–0.7)
EOSINOPHIL NFR BLD AUTO: 1.1 %
ERYTHROCYTE [DISTWIDTH] IN BLOOD BY AUTOMATED COUNT: 20.2 % (ref 10–15)
FUNGUS SPEC CULT: NORMAL
GFR SERPL CREATININE-BSD FRML MDRD: 65 ML/MIN/1.7M2
GLUCOSE BLDC GLUCOMTR-MCNC: 103 MG/DL (ref 70–99)
GLUCOSE BLDC GLUCOMTR-MCNC: 124 MG/DL (ref 70–99)
GLUCOSE BLDC GLUCOMTR-MCNC: 131 MG/DL (ref 70–99)
GLUCOSE BLDC GLUCOMTR-MCNC: 136 MG/DL (ref 70–99)
GLUCOSE BLDC GLUCOMTR-MCNC: 168 MG/DL (ref 70–99)
GLUCOSE BLDC GLUCOMTR-MCNC: 86 MG/DL (ref 70–99)
GLUCOSE SERPL-MCNC: 129 MG/DL (ref 70–99)
HCT VFR BLD AUTO: 22.7 % (ref 40–53)
HGB BLD-MCNC: 7.7 G/DL (ref 13.3–17.7)
IMM GRANULOCYTES # BLD: 0.1 10E9/L (ref 0–0.4)
IMM GRANULOCYTES NFR BLD: 2.5 %
LYMPHOCYTES # BLD AUTO: 0.7 10E9/L (ref 0.8–5.3)
LYMPHOCYTES NFR BLD AUTO: 14.7 %
MAGNESIUM SERPL-MCNC: 1.7 MG/DL (ref 1.6–2.3)
MCH RBC QN AUTO: 30.6 PG (ref 26.5–33)
MCHC RBC AUTO-ENTMCNC: 33.9 G/DL (ref 31.5–36.5)
MCV RBC AUTO: 90 FL (ref 78–100)
MONOCYTES # BLD AUTO: 0.9 10E9/L (ref 0–1.3)
MONOCYTES NFR BLD AUTO: 18.7 %
NEUTROPHILS # BLD AUTO: 3 10E9/L (ref 1.6–8.3)
NEUTROPHILS NFR BLD AUTO: 62.8 %
NRBC # BLD AUTO: 0 10*3/UL
NRBC BLD AUTO-RTO: 0 /100
PHOSPHATE SERPL-MCNC: 4.3 MG/DL (ref 2.5–4.5)
PLATELET # BLD AUTO: 70 10E9/L (ref 150–450)
POTASSIUM SERPL-SCNC: 4.4 MMOL/L (ref 3.4–5.3)
PROT SERPL-MCNC: 6.8 G/DL (ref 6.8–8.8)
RBC # BLD AUTO: 2.52 10E12/L (ref 4.4–5.9)
SODIUM SERPL-SCNC: 132 MMOL/L (ref 133–144)
SPECIMEN SOURCE: NORMAL
TACROLIMUS BLD-MCNC: 17.2 UG/L (ref 5–15)
TME LAST DOSE: ABNORMAL H
WBC # BLD AUTO: 4.8 10E9/L (ref 4–11)

## 2017-09-04 PROCEDURE — 40000133 ZZH STATISTIC OT WARD VISIT: Performed by: OCCUPATIONAL THERAPIST

## 2017-09-04 PROCEDURE — 25000132 ZZH RX MED GY IP 250 OP 250 PS 637: Performed by: PHYSICIAN ASSISTANT

## 2017-09-04 PROCEDURE — 80048 BASIC METABOLIC PNL TOTAL CA: CPT | Performed by: STUDENT IN AN ORGANIZED HEALTH CARE EDUCATION/TRAINING PROGRAM

## 2017-09-04 PROCEDURE — 25000132 ZZH RX MED GY IP 250 OP 250 PS 637: Performed by: TRANSPLANT SURGERY

## 2017-09-04 PROCEDURE — 40000193 ZZH STATISTIC PT WARD VISIT: Performed by: PHYSICAL THERAPIST

## 2017-09-04 PROCEDURE — 80197 ASSAY OF TACROLIMUS: CPT | Performed by: STUDENT IN AN ORGANIZED HEALTH CARE EDUCATION/TRAINING PROGRAM

## 2017-09-04 PROCEDURE — 00000146 ZZHCL STATISTIC GLUCOSE BY METER IP

## 2017-09-04 PROCEDURE — 85025 COMPLETE CBC W/AUTO DIFF WBC: CPT | Performed by: STUDENT IN AN ORGANIZED HEALTH CARE EDUCATION/TRAINING PROGRAM

## 2017-09-04 PROCEDURE — 97535 SELF CARE MNGMENT TRAINING: CPT | Mod: GO | Performed by: OCCUPATIONAL THERAPIST

## 2017-09-04 PROCEDURE — 40000802 ZZH SITE CHECK

## 2017-09-04 PROCEDURE — 25000131 ZZH RX MED GY IP 250 OP 636 PS 637: Performed by: TRANSPLANT SURGERY

## 2017-09-04 PROCEDURE — 36592 COLLECT BLOOD FROM PICC: CPT | Performed by: STUDENT IN AN ORGANIZED HEALTH CARE EDUCATION/TRAINING PROGRAM

## 2017-09-04 PROCEDURE — 25000132 ZZH RX MED GY IP 250 OP 250 PS 637: Performed by: NURSE PRACTITIONER

## 2017-09-04 PROCEDURE — 25000128 H RX IP 250 OP 636: Performed by: NURSE PRACTITIONER

## 2017-09-04 PROCEDURE — 25000132 ZZH RX MED GY IP 250 OP 250 PS 637

## 2017-09-04 PROCEDURE — 84100 ASSAY OF PHOSPHORUS: CPT | Performed by: STUDENT IN AN ORGANIZED HEALTH CARE EDUCATION/TRAINING PROGRAM

## 2017-09-04 PROCEDURE — 12000026 ZZH R&B TRANSPLANT

## 2017-09-04 PROCEDURE — 80076 HEPATIC FUNCTION PANEL: CPT | Performed by: STUDENT IN AN ORGANIZED HEALTH CARE EDUCATION/TRAINING PROGRAM

## 2017-09-04 PROCEDURE — 25000125 ZZHC RX 250: Performed by: STUDENT IN AN ORGANIZED HEALTH CARE EDUCATION/TRAINING PROGRAM

## 2017-09-04 PROCEDURE — 97116 GAIT TRAINING THERAPY: CPT | Mod: GP | Performed by: PHYSICAL THERAPIST

## 2017-09-04 PROCEDURE — 83735 ASSAY OF MAGNESIUM: CPT | Performed by: STUDENT IN AN ORGANIZED HEALTH CARE EDUCATION/TRAINING PROGRAM

## 2017-09-04 PROCEDURE — 97530 THERAPEUTIC ACTIVITIES: CPT | Mod: GP | Performed by: PHYSICAL THERAPIST

## 2017-09-04 RX ADMIN — ONDANSETRON 4 MG: 2 INJECTION INTRAMUSCULAR; INTRAVENOUS at 01:14

## 2017-09-04 RX ADMIN — Medication 15 ML: at 10:56

## 2017-09-04 RX ADMIN — FUROSEMIDE 20 MG: 20 TABLET ORAL at 08:06

## 2017-09-04 RX ADMIN — CYCLOBENZAPRINE HYDROCHLORIDE 10 MG: 10 TABLET, FILM COATED ORAL at 14:19

## 2017-09-04 RX ADMIN — Medication 2 G: at 14:09

## 2017-09-04 RX ADMIN — SODIUM BICARBONATE 650 MG TABLET 650 MG: at 20:59

## 2017-09-04 RX ADMIN — PANTOPRAZOLE SODIUM 40 MG: 20 TABLET, DELAYED RELEASE ORAL at 08:06

## 2017-09-04 RX ADMIN — LOPERAMIDE HYDROCHLORIDE 4 MG: 2 CAPSULE ORAL at 20:59

## 2017-09-04 RX ADMIN — SODIUM BICARBONATE 650 MG TABLET 650 MG: at 14:08

## 2017-09-04 RX ADMIN — FLUDROCORTISONE ACETATE 0.1 MG: 0.1 TABLET ORAL at 08:06

## 2017-09-04 RX ADMIN — AMLODIPINE BESYLATE 10 MG: 10 TABLET ORAL at 08:06

## 2017-09-04 RX ADMIN — AMOXICILLIN AND CLAVULANATE POTASSIUM 1 TABLET: 875; 125 TABLET, FILM COATED ORAL at 20:59

## 2017-09-04 RX ADMIN — Medication 15 ML: at 14:07

## 2017-09-04 RX ADMIN — AMOXICILLIN AND CLAVULANATE POTASSIUM 1 TABLET: 875; 125 TABLET, FILM COATED ORAL at 08:06

## 2017-09-04 RX ADMIN — OXYCODONE HYDROCHLORIDE 5 MG: 5 TABLET ORAL at 06:05

## 2017-09-04 RX ADMIN — MENTHOL 1 PATCH: 205.5 PATCH TOPICAL at 11:15

## 2017-09-04 RX ADMIN — SODIUM BICARBONATE 650 MG TABLET 650 MG: at 08:06

## 2017-09-04 RX ADMIN — TACROLIMUS 3 MG: 1 CAPSULE ORAL at 08:06

## 2017-09-04 RX ADMIN — CYCLOBENZAPRINE HYDROCHLORIDE 10 MG: 10 TABLET, FILM COATED ORAL at 06:05

## 2017-09-04 RX ADMIN — LOPERAMIDE HYDROCHLORIDE 4 MG: 2 CAPSULE ORAL at 08:06

## 2017-09-04 RX ADMIN — ACETAMINOPHEN 650 MG: 325 TABLET ORAL at 01:14

## 2017-09-04 ASSESSMENT — PAIN DESCRIPTION - DESCRIPTORS
DESCRIPTORS: ACHING
DESCRIPTORS: ACHING

## 2017-09-04 NOTE — PROGRESS NOTES
Calorie Counts  Intake recorded for: 9/3 Kcals: 1389  Protein: 69g  # Meals Recorded: 3 meals (First - 100% 8 oz whole milk, 4 oz apple juice, scrambled eggs, sausage eliane)      (Second - 100% apple slices, diet lemonade, 50% slice of cheddar and swiss cheese)          (Third - 100% 8 oz skim milk, scrambled eggs, sausage eliane)    # Supplements Recorded: 100% 1 Ensure Clear

## 2017-09-04 NOTE — PLAN OF CARE
Problem: Sepsis (Adult)  Goal: Signs and Symptoms of Listed Potential Problems Will be Absent or Manageable (Sepsis)  Signs and symptoms of listed potential problems will be absent or manageable by discharge/transition of care (reference Sepsis (Adult) CPG).      Afebrile, HR 80S, -150s/80s, O2 sats WNL with RA.  Back pain reported intermittently.  Some relief from application of Icy Hot patch.  Given Flexeril x1 now.  Blood glucose not requiring correction, CHO coverage given.  Eating well on 2gmK+ diet.  Bed and chair alarms maintained for safety today.  Pt is alert and oriented x3, disoriented to situation.  Impulsivity continues with pulling at tubes and drains, self-removal of ileostomy appliance this afternoon.  Pt generally responds well to redirection and reorientation, though at times is fixed in delusions - intermittently talking to people and animals (his dog, Rock) that are not present in room.   Wife, Danica,and daughter here to visit, both report concerns with taking pt home as planned tomorrow.   Up in chair most of the morning and walked with therapy.  Assisted pt to position of comfort in bed now, attempting to take a nap.  Dressings to mucus fistula and all drain sites changed.   Provided wife and daughter with introductory drain teaching; scheduled for PLC class re: drain care tomorrow.  Mg+ 1.7 with AM labs, replaced per ordered protocol.  Voiding adequate amounts though not always saved.  Moderate amount soft stool per ostomy.  Continue to encourage activity, sitting up in chair as pt able to tolerate.  Continue to assess pain and effectiveness of meds given.  Assess confusion and hallucinations (MD team aware) and update Liver team prn any change in pt status.

## 2017-09-04 NOTE — PLAN OF CARE
Problem: Goal Outcome Summary  Goal: Goal Outcome Summary  Outcome: Improving  Continues to be hypertensive, 160/91 at its highest. Patient was on room air until about 0400, then was put on 2L of O2 for sats dropping into the high 80's while sleeping in bed. Patient is currently sitting up in the chair and does not need O2. Blood sugars 168 and 131 this shift. Tylenol and Zofran given for generalized discomfort overnight. Patient doesn't sleep much during the night. Tolerating a 2gm K+ diet. PICC to right arm saline locked. Ileostomy put out 175mL of loose, brown/orange stool. Pigtail drain on right and left side of abdomen both had 20mL of serosanguineous output each. Larger drain on right side of abdomen put out 30mL of serous drainage. 225mL of clear, yellow urine out this shift. Encouraging oral fluids at this time. Mucous fistula covered with dressing, no drainage noted. Able to ambulate with standby assistance and walker. Continues to be pleasantly confused. Alert and oriented x4 but makes comments that are irrelevant to time and place, easily reoriented. Plan for discharge sometime this week.

## 2017-09-04 NOTE — PLAN OF CARE
Problem: Goal Outcome Summary  Goal: Goal Outcome Summary  PT 7A: Pt demonstrates bed mobility within precautions at flat bed with SBA. Pt tx sit<>stand with FWW and CGA-SBA pending seat height. Pt amb 250 ft + 350 ft with FWW and CGA-SBA, 1 seated rest break between bouts. Pt negotiated 4 stairs x 2 reps, with CGA progressing to SBA using B HR. Recommend disch home with assist and HHPT once medically appropriate to progress IND with mobility.

## 2017-09-04 NOTE — PROGRESS NOTES
Immunosuppression Note:    Camacho Bhagat is a 51 year old male who is seen today  for immunosuppression management     I, Jovan Tran MD, I have examined the patient with the resident/PA/Fellow, discussed and agree with the note and findings.  I have reviewed today's vital signs, medications, labs and imaging. I reviewed the immunosuppression medications and levels. I spoke to the patient/family and explained below clinical details and answered all the questions      Transplant Surgery  Inpatient Daily Progress Note  09/04/2017    Assessment & Plan: Camacho Bhagat is a 52 year old male with history of CASTAÑEDA cirrhosis s/p liver transplant on 3/4/17. Admitted 7/4/17 from [facility] with sepsis secondary to colitis, taken to OR for initial ex-lap with findings of typhlitis in the right colon, wound left open with wound vac in place for re exploration and interval closure on 7/5/17. Concern for CMV colitis with low count CMV viremia, underwent colonoscopy on 7/21/17, biopsies obtained.  On 7/25/17 patient became septic with abdominal compartment syndrome.  CT noted new large heterogeneous right sided retroperitoneal gas and air tracking along duodenum, and patient underwent an ex-lap, right angelique-colectomy, end ileostomy, mucus fistula, partial omentectomy on 7/26/17 by colorectal surgery.  Post-op course complicated by retroperitoneal abscess/peritonitis requiring percutaneous drainage.    Graft Function: Good function. LFTs acceptable (checking every M, Th).   Immunosuppression Management:   CellCept discontinued (6/27/17) due to neutropenia.   Prograf goal, resume goal 6-8. Level yesterday was less than 3. Gave 1mg stat and increased to 3mg bid. Will check levels today and adjust doses.  Cardiorespiratory:   -Suspected RONNIE: Uses O2 overnight only for brief apnea while sleeping per nursing.  -HTN: SBP 120s-160s. Continue amlodipine 10 mg daily. Added HCTZ 25 mg daily for HTN and hyperkalemia. Pt on  daily Florinef daily for hyperkalemia.   -R/o HCAP:  New productive cough on 8/24, no fever, improved.  CXR: retrocardiac opacity.  Already on Augmentin.  MRSA swab negative.  Duonebs and mycomust x 2 days, completed.  Out of bed. Aggressive pulmonary toilet.  Hematology: Pancytopenia present on admission, persists.  Contributing factors include medications and CMV infection.  -Anemia- Hemoglobin 7.4 (7.6). Dropped below 7 8/26 with bloody ileostomy output, scope completed, see below. Blood transfusion on 8/26 (2 units). PLT trending down. Possible due to recent bleed. Med list reviewed. Send HIT assay. ASA for HAT ppx, will stop as pt ~ 6 months. Will not restart Hep ppx. Hb 6.7 today. Received 2 U PRBC yesterday. Hb 8.1 today.   -Leukopenia/neutropenia- WBC 2.7, ANC > 800.  Received GCSF 8/20, 8/22-25.  -Thrombocytopenia-  Plt 76 trending down. Possible due to recent bleeding. Previously dropped with linezolid.  GI:   -Neutropenic colitis on admission. Colonoscopy 7/21. Biopsy: resolving injury secondary to possible drug induced vs infectious.    -Bowel perforation: 7/25 CT scan abd/pelvis-new large heterogeneous right sided retroperitoneal gas and air tracking along duodenum.  No contrast extravasation.  No intraperitoneal air noted. Colorectal surgery performed Ex lap, right angelique-colectomy, end ileostomy, mucus fistula, partial omentectomy on 7/26. Findings no neida perforation, phlegmon/inflammation right colon. Colon pathology suggested colon perforation, negative for malignancy; CMV stain positive.    -Retroperitoneal abscess/ peritonitis: See ID section below.  -Diet:  NJ tube was dislodged, hold off on replacement for a few days per Dr. Tran to monitor patient's intake.  Low K diet with aspiration precautions.  Magen counts.  -High output  ileostomy:  Goal ileostomy output ~ 1200 cc in 24 hrs. Ileostomy output sanguinous liquid with clots 8/26. Appreciate GI recs.  Ileoscopy and EGD 8/27 showed no  active bleeding or ulceration, per GI bleeding suspected from ileostomy.  No plan for capsule endoscopy at this time. Ostomy output brown with no signs of bleeding today. Monitor.  Ostomy output decreased with stopping tube feeding. Change Loperamide, lomotiland fiber discontinued. Ostomy output 925 cc yesterday. Will restart Loperamide once daily.   Fluid/Electrolytes/Renal:   -EJ: on admission, d/t ATN sec to sepsis. Now resolved, Cr 1.  -Hypernatremia: Resolved with free water.    -Hyperkalemia: Neph consulted earlier in hospitalization. 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  On daily bicarb, PRN lasix, florinef, low K diet. Reconsulted nephrology. Recommended HCTZ for HTN and hyperkalemia. Continue Florinef 0.1 mg and sodium bicarbonate. K 4.8. Consider switching HCTZ to lasix if K increased.   Endocrine: DM type 2. Endocrine consulting for glycemic management.  Glargine and NPH stopped as tube feeding stopped. Continue SSI QID and carb coverage with meals.   Infectious disease: Afebrile.  WBC 2.7.   -Retroperitoneal abscess/peritonitis: CT C/A/P 8/9 showed a persistent gas/fluid collection RLQ, peritonitis, some free air but no evidence of contrast extravasation.  8/9 retroperitoneal drain placed, cx + clostridium septicum.  8/11 paracentesis: WBC 5,038, drain cultures + clostridium septicum (day 6, broth only).  8/14 CT scan abd/pelvis: Complex gas-containing fluid collection in the right retroperitoneum decreased slightly in size, moderate ascites with intraperitoneal gas, peritonitis noted, no bowel leak.  8/15 peritoneal drain placement: WBC 15,680, culture negative.  8/23 CT:  Fluid collections slightly larger.  Patient clinically worse with increased AMS.  8/24 IR replaced retroperitoneal drain, placed new 12F LLQ drain.  Patient improved clinically after drains placed.   8/25 fluid culture from LLQ drain, budding yeast, pt doing well. Will monitor. Repeat imaging today to evaluate fluid collections. Plan to stop  Augmentin next week (4 weeks on 9/6).   Abx:  zosyn (8/9-8/22), linezolid (8/9-8/15), switched to Augmentin (8/22-present).   -CMV viremia: Primary CMV infection.  (CMV R-D+) CMV colitis per pathology, peak serum 7/15 4225 IU/ml.   IV Ganciclovir (started 7/20).  CMV count fell to <137. Decreased Ganciclovir to 5mg/kg on 8/18.  Switched to valcyte 900mg daily 8/22, continue until 8/26.  Check CMV quant weekly until 9/24 and then every other week for 2 months. Last check 8/24 CMV not detected. Check next 8/31.  -EBV viremia:  Peak serum 406,697 copies/ml on 7/18.   753 copies/ml on 8/15.  Check quant monthly, due 9/15.  -Cellulitis/dry gangrene:  Right 1st finger, on linezolid (8/19- 8/22), switched to doxycycline x7 days (8/22-8/28). Clinically resolved.  -Reference ID note from 8/28 for final recs.  Infectious disease resolved issues:  -Severe sepsis: On admission, secondary to right colon phlegmon. Meropenem/daptomycin/micafungin tx x 10 days completed on 8/3. S/p right angelique-colectomy.  Path: CMV stain +, perforation of colon noted.   Neuro: Toxic metabolic encephalopathy secondary to infection, prolonged hospital stay.  Improved.  Vascular:  Microvascular ischemic injury in digits (toes, finger) this is most likely due to injury while patient was on pressor therapy with sepsis. Erythema right 1st finger documented and marked, now resolved.   Activity: Deconditioned due to prolong hospital course. Daily PT/OT, encourage pt to be OOB to chair at least TID (patient prefers up to side of bed).   Prophylaxis: DVT-Heparin SQ, hold today due to bleeding.  Disposition: 7A. Plan to discharge to home. Continue with daily PT/OT/OOB as much as possible. Possible d/c early next week.    Medical Decision Making: Medium    PILLO/Fellow/Resident Provider: Eric Dacosta  Fellow, Transplant surgery  Pager: [phone]  Faculty: Jovan Chinnakotla, MD     __________________________________________________________________  Interval  History:   Overnight events:   Appetite poor, though continues to have poor appetite. Ambulating. Participating in education.       ROS:   A 10-point review of systems was negative except as noted above.    Meds:    alteplase  1 mg Intravenous Once     alteplase  1 mg Intravenous Once     tacrolimus  3 mg Oral BID IS     loperamide  4 mg Oral BID     lidocaine  1 patch Transdermal Q24h    And     lidocaine   Transdermal Q24H    And     lidocaine   Transdermal Q8H     sodium bicarbonate  650 mg Per NG tube TID     furosemide  20 mg Oral Daily     insulin aspart  1-10 Units Subcutaneous TID AC     insulin aspart  1-7 Units Subcutaneous At Bedtime     amoxicillin-clavulanate  1 tablet Oral Q12H FRANCO     amLODIPine  10 mg Oral Daily     pantoprazole  40 mg Oral Daily     menthol   Transdermal Q8H     insulin aspart   Subcutaneous TID w/meals     artificial saliva  15 mL Swish & Spit 4x Daily     fludrocortisone  0.1 mg Oral Daily     sodium chloride (PF)  10 mL Irrigation Q8H     sodium chloride (PF)  20 mL Irrigation Q6H     miconazole with skin protectant   Topical BID     sodium chloride (PF)  3 mL Intracatheter Q8H       Physical Exam:     Admit Weight: 94.2 kg (207 lb 11.2 oz) (SCALE 2)    Current vitals:   /83  Pulse 91  Temp 98.7  F (37.1  C)  Resp 20  Ht 1.829 m (6')  Wt 94.4 kg (208 lb 1.8 oz)  SpO2 93%  BMI 28.23 kg/m2    Vital sign ranges:    Temp:  [97.5  F (36.4  C)-98.7  F (37.1  C)] 98.7  F (37.1  C)  Pulse:  [91-95] 91  Heart Rate:  [83-95] 83  Resp:  [18-20] 20  BP: (142-166)/(74-91) 142/83  SpO2:  [90 %-96 %] 93 %  Patient Vitals for the past 24 hrs:   BP Temp Temp src Pulse Heart Rate Resp SpO2 Weight   09/04/17 0700 142/83 98.7  F (37.1  C) - - 83 20 93 % -   09/04/17 0501 - - - - - - - 94.4 kg (208 lb 1.8 oz)   09/04/17 0414 - - - - - - 96 % -   09/04/17 0400 149/85 97.5  F (36.4  C) Oral - 85 20 90 % -   09/03/17 2359 (!) 160/91 98.5  F (36.9  C) Axillary - 92 18 93 % -   09/03/17 2100  154/85 98.1  F (36.7  C) Oral 91 91 20 91 % -   09/03/17 1556 158/74 98.2  F (36.8  C) Oral 91 91 20 91 % -   09/03/17 1201 166/88 98.5  F (36.9  C) Oral 95 95 18 92 % -     General Appearance: NAD  Skin: normal, warm, dry  Heart: RRR, well perfused  Lungs: Nonlabored resps on RA  Abdomen: soft, rounded, mild tender LQ unchanged. Ileostomy liquid slightly reddish brown no signs of bleeding from stoma.  Mucus fistula covered. Midline incision, c/d/i.  Right abdominal drains: both serosang.  LLQ drain: serosang output.  : No vazquez  Extremities: No edema. R index finger with necrotic blacked finger.  Necrotic blackened areas on right 1st & 2nd toes and left 2nd toe.  Neurologic: A&O x4. No tremor      Data:   CMP    Recent Labs  Lab 09/04/17  0605 09/03/17  0700  08/31/17  0545   * 135  < > 138   POTASSIUM 4.4 4.9  < > 4.8   CHLORIDE 100 104  < > 104   CO2 24 26  < > 28   * 207*  < > 132*   BUN 24 23  < > 24   CR 1.18 1.14  < > 1.03   GFRESTIMATED 65 67  < > 76   GFRESTBLACK 78 81  < > >90   JACOB 7.7* 8.1*  < > 7.9*   MAG 1.7 1.8  < > 1.8   PHOS 4.3 4.2  < > 4.8*   ALBUMIN 2.1*  --   --  1.8*   BILITOTAL 0.8  --   --  0.5   ALKPHOS 213*  --   --  204*   AST 43  --   --  31   ALT 15  --   --  14   < > = values in this interval not displayed.  CBC    Recent Labs  Lab 09/04/17  0605 09/03/17  0700   HGB 7.7* 8.1*   WBC 4.8 4.0   PLT 70* 68*     COAGS  No lab results found in last 7 days.    Invalid input(s): XA   Urinalysis  Recent Labs   Lab Test  08/10/17   1752  08/08/17   1600   06/14/17   1508   04/11/16   1345   COLOR  Yellow  Yellow   < >   --    < >  Yellow   APPEARANCE  Slightly Cloudy  Slightly Cloudy   < >   --    < >  Clear   URINEGLC  Negative  Negative   < >   --    < >  30*   URINEBILI  Small   This is an unconfirmed screening test result. A positive result may be false.  *  Negative   < >   --    < >  Negative   URINEKETONE  Negative  Negative   < >   --    < >  Negative   SG  1.012  1.010    < >   --    < >  1.016   UBLD  Negative  Negative   < >   --    < >  Small*   URINEPH  5.0  5.0   < >   --    < >  5.0   PROTEIN  30*  30*   < >   --    < >  30*   NITRITE  Negative  Negative   < >   --    < >  Negative   LEUKEST  Negative  Negative   < >   --    < >  Negative   RBCU  0  3*   < >   --    < >  1   WBCU  10*  7*   < >   --    < >  1   UTPG   --    --    --   1.55*   --   0.41*    < > = values in this interval not displayed.

## 2017-09-04 NOTE — PLAN OF CARE
Problem: Goal Outcome Summary  Goal: Goal Outcome Summary     Hypertensive (150s/70-80s), all other vital signs stable. No complaints of pain or nausea. Good appetite on 2 gm K diet. Up in room with standby assist, up to chair for meals. Bed alarm activated for impulsiveness. Disoriented to time and situation after waking from sleep, but easily reoriented. Blood sugars 120, 210, covered with sliding scale and carb. counts.

## 2017-09-04 NOTE — PLAN OF CARE
"Problem: Goal Outcome Summary  Goal: Goal Outcome Summary  OT 7A: Pt scored 18/30 on SLUMS indicating \"dementia\" level. Pt answered 8/8 safety questions appropriately. Demonstrated poor FWW safety/awareness with functional mobility/ADLs in room. Required Magdi with oral cares for object recognition & sequencing. Intermittent confusion/mild agitation, such as seeing dog in room and thinking his dtr was sitting at nurse's station outside of room.     REC: Feel pt would benefit from TCU, but if returns home, will require 24 hr supervision and assist with I/ADLs due to safety concerns with cognition. Also recommend home OT/PT      "

## 2017-09-05 ENCOUNTER — APPOINTMENT (OUTPATIENT)
Dept: PHYSICAL THERAPY | Facility: CLINIC | Age: 52
End: 2017-09-05
Attending: TRANSPLANT SURGERY
Payer: COMMERCIAL

## 2017-09-05 LAB
ANION GAP SERPL CALCULATED.3IONS-SCNC: 8 MMOL/L (ref 3–14)
BASOPHILS # BLD AUTO: 0 10E9/L (ref 0–0.2)
BASOPHILS NFR BLD AUTO: 0.6 %
BUN SERPL-MCNC: 25 MG/DL (ref 7–30)
CALCIUM SERPL-MCNC: 8 MG/DL (ref 8.5–10.1)
CHLORIDE SERPL-SCNC: 99 MMOL/L (ref 94–109)
CO2 SERPL-SCNC: 22 MMOL/L (ref 20–32)
CREAT SERPL-MCNC: 1.2 MG/DL (ref 0.66–1.25)
DIFFERENTIAL METHOD BLD: ABNORMAL
EOSINOPHIL # BLD AUTO: 0 10E9/L (ref 0–0.7)
EOSINOPHIL NFR BLD AUTO: 0.8 %
ERYTHROCYTE [DISTWIDTH] IN BLOOD BY AUTOMATED COUNT: 20 % (ref 10–15)
GFR SERPL CREATININE-BSD FRML MDRD: 63 ML/MIN/1.7M2
GLUCOSE BLDC GLUCOMTR-MCNC: 113 MG/DL (ref 70–99)
GLUCOSE BLDC GLUCOMTR-MCNC: 119 MG/DL (ref 70–99)
GLUCOSE BLDC GLUCOMTR-MCNC: 120 MG/DL (ref 70–99)
GLUCOSE BLDC GLUCOMTR-MCNC: 91 MG/DL (ref 70–99)
GLUCOSE BLDC GLUCOMTR-MCNC: 98 MG/DL (ref 70–99)
GLUCOSE SERPL-MCNC: 89 MG/DL (ref 70–99)
HCT VFR BLD AUTO: 22.5 % (ref 40–53)
HGB BLD-MCNC: 7.7 G/DL (ref 13.3–17.7)
IMM GRANULOCYTES # BLD: 0.1 10E9/L (ref 0–0.4)
IMM GRANULOCYTES NFR BLD: 1 %
LYMPHOCYTES # BLD AUTO: 0.7 10E9/L (ref 0.8–5.3)
LYMPHOCYTES NFR BLD AUTO: 14.5 %
MAGNESIUM SERPL-MCNC: 2 MG/DL (ref 1.6–2.3)
MCH RBC QN AUTO: 30.8 PG (ref 26.5–33)
MCHC RBC AUTO-ENTMCNC: 34.2 G/DL (ref 31.5–36.5)
MCV RBC AUTO: 90 FL (ref 78–100)
MONOCYTES # BLD AUTO: 0.8 10E9/L (ref 0–1.3)
MONOCYTES NFR BLD AUTO: 16.4 %
NEUTROPHILS # BLD AUTO: 3.3 10E9/L (ref 1.6–8.3)
NEUTROPHILS NFR BLD AUTO: 66.7 %
NRBC # BLD AUTO: 0 10*3/UL
NRBC BLD AUTO-RTO: 0 /100
PHOSPHATE SERPL-MCNC: 4.4 MG/DL (ref 2.5–4.5)
PLATELET # BLD AUTO: 79 10E9/L (ref 150–450)
POTASSIUM SERPL-SCNC: 4.7 MMOL/L (ref 3.4–5.3)
RBC # BLD AUTO: 2.5 10E12/L (ref 4.4–5.9)
SODIUM SERPL-SCNC: 130 MMOL/L (ref 133–144)
TACROLIMUS BLD-MCNC: 6.8 UG/L (ref 5–15)
TME LAST DOSE: NORMAL H
WBC # BLD AUTO: 4.9 10E9/L (ref 4–11)

## 2017-09-05 PROCEDURE — 85025 COMPLETE CBC W/AUTO DIFF WBC: CPT | Performed by: STUDENT IN AN ORGANIZED HEALTH CARE EDUCATION/TRAINING PROGRAM

## 2017-09-05 PROCEDURE — 80197 ASSAY OF TACROLIMUS: CPT | Performed by: STUDENT IN AN ORGANIZED HEALTH CARE EDUCATION/TRAINING PROGRAM

## 2017-09-05 PROCEDURE — 40000558 ZZH STATISTIC CVC DRESSING CHANGE

## 2017-09-05 PROCEDURE — 84100 ASSAY OF PHOSPHORUS: CPT | Performed by: STUDENT IN AN ORGANIZED HEALTH CARE EDUCATION/TRAINING PROGRAM

## 2017-09-05 PROCEDURE — 25000131 ZZH RX MED GY IP 250 OP 636 PS 637: Performed by: TRANSPLANT SURGERY

## 2017-09-05 PROCEDURE — 25000132 ZZH RX MED GY IP 250 OP 250 PS 637: Performed by: TRANSPLANT SURGERY

## 2017-09-05 PROCEDURE — 80048 BASIC METABOLIC PNL TOTAL CA: CPT | Performed by: STUDENT IN AN ORGANIZED HEALTH CARE EDUCATION/TRAINING PROGRAM

## 2017-09-05 PROCEDURE — 97530 THERAPEUTIC ACTIVITIES: CPT | Mod: GP

## 2017-09-05 PROCEDURE — 12000026 ZZH R&B TRANSPLANT

## 2017-09-05 PROCEDURE — 40000802 ZZH SITE CHECK

## 2017-09-05 PROCEDURE — 36592 COLLECT BLOOD FROM PICC: CPT | Performed by: STUDENT IN AN ORGANIZED HEALTH CARE EDUCATION/TRAINING PROGRAM

## 2017-09-05 PROCEDURE — 83735 ASSAY OF MAGNESIUM: CPT | Performed by: STUDENT IN AN ORGANIZED HEALTH CARE EDUCATION/TRAINING PROGRAM

## 2017-09-05 PROCEDURE — 25000132 ZZH RX MED GY IP 250 OP 250 PS 637

## 2017-09-05 PROCEDURE — 40000901 ZZH STATISTIC WOC PT EDUCATION, 0-15 MIN

## 2017-09-05 PROCEDURE — 00000146 ZZHCL STATISTIC GLUCOSE BY METER IP

## 2017-09-05 PROCEDURE — 40000193 ZZH STATISTIC PT WARD VISIT

## 2017-09-05 PROCEDURE — 25000132 ZZH RX MED GY IP 250 OP 250 PS 637: Performed by: PHYSICIAN ASSISTANT

## 2017-09-05 RX ADMIN — PANTOPRAZOLE SODIUM 40 MG: 20 TABLET, DELAYED RELEASE ORAL at 08:19

## 2017-09-05 RX ADMIN — TACROLIMUS 1.5 MG: 1 CAPSULE ORAL at 08:18

## 2017-09-05 RX ADMIN — AMLODIPINE BESYLATE 10 MG: 10 TABLET ORAL at 08:19

## 2017-09-05 RX ADMIN — SODIUM BICARBONATE 650 MG TABLET 650 MG: at 08:19

## 2017-09-05 RX ADMIN — SODIUM BICARBONATE 650 MG TABLET 650 MG: at 20:55

## 2017-09-05 RX ADMIN — AMOXICILLIN AND CLAVULANATE POTASSIUM 1 TABLET: 875; 125 TABLET, FILM COATED ORAL at 08:19

## 2017-09-05 RX ADMIN — AMOXICILLIN AND CLAVULANATE POTASSIUM 1 TABLET: 875; 125 TABLET, FILM COATED ORAL at 20:55

## 2017-09-05 RX ADMIN — SODIUM BICARBONATE 650 MG TABLET 650 MG: at 16:35

## 2017-09-05 RX ADMIN — FLUDROCORTISONE ACETATE 0.1 MG: 0.1 TABLET ORAL at 08:19

## 2017-09-05 RX ADMIN — FUROSEMIDE 20 MG: 20 TABLET ORAL at 08:19

## 2017-09-05 RX ADMIN — TACROLIMUS 1.5 MG: 1 CAPSULE ORAL at 18:33

## 2017-09-05 RX ADMIN — OXYCODONE HYDROCHLORIDE 5 MG: 5 TABLET ORAL at 16:35

## 2017-09-05 RX ADMIN — ACETAMINOPHEN 650 MG: 325 TABLET ORAL at 16:35

## 2017-09-05 NOTE — PLAN OF CARE
Problem: Goal Outcome Summary  Goal: Goal Outcome Summary  Outcome: Improving  Camacho has been afebrile w stable VS this shift on room air, hypertension 148/48 & 168/89. Pt is mostly A & O x4, however occasionally will say things that are a little off. BGs 120 & 119. Pt tolerated a full breakfast and a glucerna. Reported some nausea this afternoon and refused lunch. Up with SBA. Drain dressings changed, small output. Voiding good amounts, no BM this shift. Pt went outside for a bit and sat in the chair. Working with PT/OT. Plan for rehab when a bed is found. Drain care done with family today. Mom and wife updated on POC. Will continue to monitor and notify team w concerns.

## 2017-09-05 NOTE — PROGRESS NOTES
[facility]              Name: Camacho Bhagat  Date: 9/5/17  To order your ostomy supplies    Call:      [facility]  Ph. [phone] ext-[phone]    Your Medical Supplier will need your insurance information and surgeon's name, phone and fax number    Clinic:                     Phone                            Fax  Colorectal Surgery:    [phone]  Verbal Order X 1 Month per:            Johnna Hansen RN CWOCN        Authorizing MD: Dr. Dinh, Sara DANGELO MD    Request the following supplies:      Kyara   1 piece flat fecal pouch with filter opaque with viewing option ( # [id number])     Accessories  2  barrier ring #[id number]       Adapt powder #[id number]             Change your pouch twice a week, more often if leaking.    If you are cutting a hole in the wafer of your pouch, recheck stoma size and adjust pouch opening as needed every week    . Call the Ostomy Nurse at [facility]       [address] [place] [address] : [phone]   Schedule a follow-up visit in 4 to 6 weeks after your surgery, sooner if having problems Bring a complete set of pouch-changing supplies to this visit      Problems you should Report  - The stoma turns blue or darker in color.  - Cuts or sores around the stoma.  - Red, raw or painful skin around the stoma.  - Any bulging of the skin around the stoma.  - A pouch that leaks every day.  - Problems making the right size hole in the pouch wafer.    Please call with any questions or concerns.

## 2017-09-05 NOTE — PROGRESS NOTES
Calorie Counts  Intake recorded for: 9/4  Kcals: 699  Protein: 20g  # Meals Recorded: 100% brownie, milk, 3 diet lemonades, 75% green beans, 50% meatloaf with gravy, squash, medium strawberry smoothie from [employer]  # Supplements Recorded: 0

## 2017-09-05 NOTE — PLAN OF CARE
Problem: Goal Outcome Summary  Goal: Goal Outcome Summary  PT 7A: pt declining OOB mobility this date. Confused at beginning of session and spent time reorienting. Also provided motivation and education. Please keep pt awake as much as possible during the day with short bouts of naps. See below. Recommend TCU at discharge.     Interdisciplinary Non-Pharmacological Management of Delirium:      General Supportive Measures: Ensure adequate hydration and nutrition. Schedule toileting. Appropriate assessment and treatment of pain.   Re-[place] Patient: Ensure clock has correct time and white board has correct date. Communicate clearly, providing explanations as appropriate. Encourage presence of family members for reassurance. Have family/caregiver bring in familiar objects/pictures.  Cognition: Engage in appropriate meaningful communication and activities with patient (current events discussion, word games, magazines, newspapers).   Sensory: Use eyeglasses, hearing aids, or voice amplifiers as appropriate.   Avoid Use of Physical Restraints as Appropriate: Indicate need and frequently re-assess vazquez catheters and other tethers (cap IVs if medically appropriate).   Maintain Mobility and Self Care Ability: Avoid bedrest. Have patient up in chair for meals if appropriate.   Normalize Sleep-Wake Cycle: Discourage too much daytime napping (less than 30 minutes at a time), aim for uninterrupted periods of sleep at night.   Days: Keep room well light with lights on and shades open.   Night: Keep room quiet with low level lighting.   For Agitation: Avoid overstimulating environment, try music, massage, appropriate TV stations, and relaxation techniques. Take patient for a walk if appropriate, even if in the middle of the night.   Safety Concerns: Patients with delirium are at high risk for falls. Use bed and chair alarms along with frequent surveillance.

## 2017-09-05 NOTE — PROGRESS NOTES
"Social Work Services Progress Note    Hospital Day: 64  Date of Initial Social Work Evaluation:  8/1/2017  Collaborated with:  Wife, medical team, TCU.    Data:  OT now recommending pt go to TCU.    Intervention/Assessment:  Spoke with pts wife about new recommendations. She would like pt to go to [facility] and she does not think it is a good idea for him to return home because of his confusion. SW put pt back on [facility] list and they are reviewing pt again. SW took pt off TCU list last week because both PT/OT were recommending he return home. Spoke with medical team who would like pt to go to [facility] before returning home. Pts wife in agreement with TCU and [facility] is her first choice. Per team pt is not ready to discharge today and wife thinks this will make pt \"crabby\". Per wife she and pts mother are attending [facility] today. Pts wife in agreement with SW looking into other options for TCU if [facility] will not accept pt.    Addendum (9155): Unsure if [facility] will accept pt, talked with pt about how he will need 24 hr care if he goes home. Per pt his family has the caregiver schedule all figured out and someone will always be with him. Per wife the family does not have the schedule figured out. Wife and daughter work. Pts mother is available (which is where he will be staying) but she has other obligations and per wife will likely only be available at night and in the morning. SW suggested care conference to figure out the best plan. This is tentatively set up for 9/7 at 11am. Waiting to hear back from wife if this works for her. SW explained to wife that other TCU's may accept pt even if [facility] does not. Pt would like to return home. SW to look into other options tomorrow morning.    Plan:    Anticipated Disposition: [facility].    Barriers to d/c plan:  Medical stability.    Follow Up:  SW to coordinate with [facility] about acceptance and bed availability.    JOSEY Chowdhury, [address]   Ph: [phone], Pager: " [phone]

## 2017-09-05 NOTE — PROGRESS NOTES
Immunosuppression Note:    Camacho Bhagat is a 51 year old male who is seen today  for immunosuppression management     I, Jovan Tran MD, I have examined the patient with the resident/PA/Fellow, discussed and agree with the note and findings.  I have reviewed today's vital signs, medications, labs and imaging. I reviewed the immunosuppression medications and levels. I spoke to the patient/family and explained below clinical details and answered all the questions      Transplant Surgery  Inpatient Daily Progress Note  09/05/2017    Assessment & Plan: Camacho Bhagat is a 52 year old male with history of CASTAÑEDA cirrhosis s/p liver transplant on 3/4/17. Admitted 7/4/17 from [facility] with sepsis secondary to colitis, taken to OR for initial ex-lap with findings of typhlitis in the right colon, wound left open with wound vac in place for re exploration and interval closure on 7/5/17. Concern for CMV colitis with low count CMV viremia, underwent colonoscopy on 7/21/17, biopsies obtained.  On 7/25/17 patient became septic with abdominal compartment syndrome.  CT noted new large heterogeneous right sided retroperitoneal gas and air tracking along duodenum, and patient underwent an ex-lap, right angelique-colectomy, end ileostomy, mucus fistula, partial omentectomy on 7/26/17 by colorectal surgery.  Post-op course complicated by retroperitoneal abscess/peritonitis requiring percutaneous drainage.    Graft Function: Good function. LFTs stable (checking every M, Th).   Immunosuppression Management:   CellCept discontinued (6/27/17) due to neutropenia.   Tacrolimus goal 6-8, level was 17.2 on 9/4, suspect level was drawn from PICC line (inaccurate). PM dose held last night, and today's level is 6.8 (23-hour level). Will continue 1.5 mg BID and repeat Tacrolimus level tomorrow.   Cardiorespiratory:   -Suspected RONNIE: Uses O2 overnight only for brief apnea while sleeping per nursing.  -HTN: SBP 140s-160s. Continue amlodipine 10  mg daily. Pt on daily Florinef daily for hyperkalemia.   -R/o HCAP:  New productive cough on 8/24, no fever, improved.  CXR: retrocardiac opacity.  Already on Augmentin.  MRSA swab negative.  Duonebs and mycomust x 2 days, completed.  Out of bed. Aggressive pulmonary toilet.  Hematology: Pancytopenia present on admission, persists.  Contributing factors include medications and CMV infection.  -Anemia- Hemoglobin 7.7 (7.7). Dropped below 7 on 8/26 with bloody ileostomy output, scope completed, see below. Blood transfusion on 8/26 (2 units). HIT panel on 8/30 was negative. Was on ASA for HAT ppx; was stopped as pt ~ 6 months out from transplant.  -Leukopenia/neutropenia- WBC 4.9, ANC > 700.  Received GCSF 8/20, 8/22-25.  -Thrombocytopenia-  Plt stable at 79.  GI:   -Neutropenic colitis on admission. Colonoscopy 7/21. Biopsy: resolving injury secondary to possible drug induced vs infectious.    -Bowel perforation: 7/25 CT scan abd/pelvis-new large heterogeneous right sided retroperitoneal gas and air tracking along duodenum.  No contrast extravasation.  No intraperitoneal air noted. Colorectal surgery performed Ex lap, right angelique-colectomy, end ileostomy, mucus fistula, partial omentectomy on 7/26. Findings no neida perforation, phlegmon/inflammation right colon. Colon pathology suggested colon perforation, negative for malignancy; CMV stain positive.    -Retroperitoneal abscess/ peritonitis: See ID section below.  -Diet:  On low K diet with aspiration precautions.  Magen counts.  -High output ileostomy:  Goal ileostomy output ~ 1200 cc in 24 hrs. Appreciate GI recs.  Ileoscopy and EGD 8/27 showed no active bleeding or ulceration, per GI bleeding suspected from ileostomy. Ostomy output decreased after tube feedings were stopped. Change Loperamide. Ostomy output 575 cc yesterday.   Fluid/Electrolytes/Renal:   -EJ: on admission, d/t ATN sec to sepsis. Now resolved, Cr stable in the 1.0-1.2 range.  -Hypernatremia: Resolved  with free water. Sodium level now 130.   -Hyperkalemia: Neph consulted earlier in hospitalization. 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  On daily bicarb, lasix, florinef, and a low K diet. Continue Florinef 0.1 mg and sodium bicarbonate. K today is 4.7.   Endocrine: DM type 2. Endocrine consulted for glycemic management, glargine and NPH stopped after tube feedings were discontinued. Continue SSI QID and carb coverage with meals. BG well controlled.  Infectious disease: Afebrile.  WBC 4.9.   -Retroperitoneal abscess/peritonitis: CT C/A/P 8/9 showed a persistent gas/fluid collection RLQ, peritonitis, some free air but no evidence of contrast extravasation.  8/9 retroperitoneal drain placed, cx + clostridium septicum.  8/11 paracentesis: WBC 5,038, drain cultures + clostridium septicum (day 6, broth only).  8/14 CT scan abd/pelvis: Complex gas-containing fluid collection in the right retroperitoneum decreased slightly in size, moderate ascites with intraperitoneal gas, peritonitis noted, no bowel leak.  8/15 peritoneal drain placement: WBC 15,680, culture negative.  8/23 CT:  Fluid collections slightly larger.  Patient clinically worse with increased AMS.  8/24 IR replaced retroperitoneal drain, placed new 12F LLQ drain.  Patient improved clinically after drains placed.  8/25 fluid culture from LLQ drain, budding yeast, pt doing well. Will monitor. Plan to stop Augmentin next week (4 weeks on 9/6).   Abx:  zosyn (8/9-8/22), linezolid (8/9-8/15), switched to Augmentin (8/22-present).   -CMV viremia: Primary CMV infection.  (CMV R-D+) CMV colitis per pathology, peak serum 7/15 4225 IU/ml.   IV Ganciclovir (started 7/20).  CMV count fell to <137. Decreased Ganciclovir to 5mg/kg on 8/18.  Switched to valcyte 900mg daily 8/22, which was discontinued on 8/26.  Checking CMV quant weekly until 9/24 and then every other week for 2 months. Last check 8/31 CMV not detected.  -EBV viremia:  Peak serum 406,697 copies/ml on 7/18.   753 copies/ml on  8/15.  Check quant monthly, due 9/15.  -Cellulitis/dry gangrene:  Right 1st finger, on linezolid (8/19- 8/22), switched to doxycycline x7 days (8/22-8/28). Clinically resolved.  -Reference ID note from 8/28 for final recs.  Infectious disease resolved issues:  -Severe sepsis: On admission, secondary to right colon phlegmon. Meropenem/daptomycin/micafungin tx x 10 days completed on 8/3. S/p right angelique-colectomy.  Path: CMV stain +, perforation of colon noted.   Neuro: Toxic metabolic encephalopathy secondary to infection, prolonged hospital stay.  Improved.  Vascular:  Microvascular ischemic injury in digits (toes, finger) this is most likely due to injury while patient was on pressor therapy with sepsis. Erythema right 1st finger documented and marked, now resolved.   Activity: Daily PT/OT, encourage pt to be OOB to chair at least TID (patient prefers up to side of bed).   Prophylaxis: DVT: SQ heparin d/c'd on 8/29 due to thrombocytopenia and bleeding issues.  Disposition: 7A. SW evaluating possible transfer to TCU prior to returning home. Planning for care conference with wife this week.  Medical Decision Making: Medium    PILLO/Fellow/Resident Provider: Felicia Tolliver NP/COLETTE Cortés, transplant surgery fellow, pager: [phone]    Faculty: Jovan Tran MD     __________________________________________________________________  Interval History:   Overnight events:   Appetite slightly improved, denies nausea. Tolerating activity well, eager to return home.    ROS:   A 10-point review of systems was negative except as noted above.    Meds:    tacrolimus  1.5 mg Oral BID IS     loperamide  4 mg Oral BID     lidocaine  1 patch Transdermal Q24h    And     lidocaine   Transdermal Q24H    And     lidocaine   Transdermal Q8H     sodium bicarbonate  650 mg Per NG tube TID     furosemide  20 mg Oral Daily     insulin aspart  1-10 Units Subcutaneous TID AC     insulin aspart  1-7 Units Subcutaneous  At Bedtime     amoxicillin-clavulanate  1 tablet Oral Q12H FRANCO     amLODIPine  10 mg Oral Daily     pantoprazole  40 mg Oral Daily     menthol   Transdermal Q8H     insulin aspart   Subcutaneous TID w/meals     artificial saliva  15 mL Swish & Spit 4x Daily     fludrocortisone  0.1 mg Oral Daily     sodium chloride (PF)  10 mL Irrigation Q8H     sodium chloride (PF)  20 mL Irrigation Q6H     miconazole with skin protectant   Topical BID     sodium chloride (PF)  3 mL Intracatheter Q8H       Physical Exam:     Admit Weight: 94.2 kg (207 lb 11.2 oz) (SCALE 2)    Current vitals:   /89 (BP Location: Left arm)  Pulse 92  Temp 97.7  F (36.5  C) (Oral)  Resp 16  Ht 1.829 m (6')  Wt 95.5 kg (210 lb 9.6 oz)  SpO2 97%  BMI 28.56 kg/m2    Vital sign ranges:    Temp:  [97.7  F (36.5  C)-98.6  F (37  C)] 97.7  F (36.5  C)  Pulse:  [74-92] 92  Heart Rate:  [74-91] 89  Resp:  [16-20] 16  BP: (135-180)/(78-96) 165/89  SpO2:  [90 %-97 %] 97 %  Patient Vitals for the past 24 hrs:   BP Temp Temp src Pulse Heart Rate Resp SpO2 Weight   09/05/17 1205 165/89 97.7  F (36.5  C) Oral 92 - 16 97 % -   09/05/17 0900 - - - - - - - 95.5 kg (210 lb 9.6 oz)   09/05/17 0718 148/84 98.6  F (37  C) Oral - 89 16 94 % -   09/05/17 0429 145/86 98.4  F (36.9  C) Oral - 91 16 90 % -   09/04/17 2340 135/78 97.9  F (36.6  C) Axillary - 86 18 92 % -   09/04/17 2127 159/80 - - - - - - -   09/04/17 2048 (!) 180/96 97.9  F (36.6  C) Oral 88 88 20 94 % -   09/04/17 1620 149/78 98.1  F (36.7  C) Axillary 74 74 18 91 % -     General Appearance: NAD  Skin: normal, warm, dry  Heart: RRR, well perfused  Lungs: Nonlabored resps on RA  Abdomen: soft, rounded, mild tender LQ unchanged. Ileostomy liquid slightly reddish brown no signs of bleeding from stoma.  Mucus fistula covered. Midline incision, c/d/i.  Right abdominal drains: both serosang.  LLQ drain: serosang output.  : No vazquez, good UOP  Extremities: No edema. R index finger with necrotic finger.   Necrotic blackened areas on right 1st & 2nd toes and left 2nd toe.  Neurologic: A&O x4. No tremor      Data:   CMP    Recent Labs  Lab 09/05/17  0643 09/04/17  0605  08/31/17  0545   * 132*  < > 138   POTASSIUM 4.7 4.4  < > 4.8   CHLORIDE 99 100  < > 104   CO2 22 24  < > 28   GLC 89 129*  < > 132*   BUN 25 24  < > 24   CR 1.20 1.18  < > 1.03   GFRESTIMATED 63 65  < > 76   GFRESTBLACK 77 78  < > >90   JACOB 8.0* 7.7*  < > 7.9*   MAG 2.0 1.7  < > 1.8   PHOS 4.4 4.3  < > 4.8*   ALBUMIN  --  2.1*  --  1.8*   BILITOTAL  --  0.8  --  0.5   ALKPHOS  --  213*  --  204*   AST  --  43  --  31   ALT  --  15  --  14   < > = values in this interval not displayed.  CBC    Recent Labs  Lab 09/05/17  0643 09/04/17  0605   HGB 7.7* 7.7*   WBC 4.9 4.8   PLT 79* 70*     COAGS  No lab results found in last 7 days.    Invalid input(s): XA   Urinalysis  Recent Labs   Lab Test  08/10/17   1752  08/08/17   1600   06/14/17   1508   04/11/16   1345   COLOR  Yellow  Yellow   < >   --    < >  Yellow   APPEARANCE  Slightly Cloudy  Slightly Cloudy   < >   --    < >  Clear   URINEGLC  Negative  Negative   < >   --    < >  30*   URINEBILI  Small   This is an unconfirmed screening test result. A positive result may be false.  *  Negative   < >   --    < >  Negative   URINEKETONE  Negative  Negative   < >   --    < >  Negative   SG  1.012  1.010   < >   --    < >  1.016   UBLD  Negative  Negative   < >   --    < >  Small*   URINEPH  5.0  5.0   < >   --    < >  5.0   PROTEIN  30*  30*   < >   --    < >  30*   NITRITE  Negative  Negative   < >   --    < >  Negative   LEUKEST  Negative  Negative   < >   --    < >  Negative   RBCU  0  3*   < >   --    < >  1   WBCU  10*  7*   < >   --    < >  1   UTPG   --    --    --   1.55*   --   0.41*    < > = values in this interval not displayed.

## 2017-09-05 NOTE — PROGRESS NOTES
CLINICAL NUTRITION SERVICES - REASSESSMENT NOTE     Nutrition Prescription    RECOMMENDATIONS FOR MDs/PROVIDERS TO ORDER:  None at this time    Malnutrition Status:    Unable to determine due to not being able to meet pt    Recommendations already ordered by Registered Dietitian (RD):  - Will re-order calorie counts for ongoing monitoring of intake    Future/Additional Recommendations:  - If calorie count data/results indicate pt not being able to meet his nutritional needs and continues with persistent sub optimal PO, would need to consider re-starting TF (if within pt's GOC).        EVALUATION OF THE PROGRESS TOWARD GOALS   Diet: NPO earlier this am, now 2 gm K+ diet with Ensure clear and Ensure Plus or Similar as Supplements.   Nutrition Support: TF was d/ce don 8/27.   Intake: Pt ate breakfast and drank a Glucerna. Pt did eat lunch 2/2 nausea.  Pt was on Calorie Counts from 8/29 - 9/4 and per calorie count data, indicated a slow increasing trend especially from 8/30 -9/3. Per 7 day average intake indicates pt consumed on average 1308 kcal/day (15 kcal/kg) and 55 gm protein/day (0.6 gm/kg). Pt appears acceptable to ONS. Will continue to monitor and re-order Calorie Counts     NEW FINDINGS   Pt is occasionally confused per chart. Ileostomy output trending down. Unable to meet pt 2/2 pt busy with provider.     MALNUTRITION  % Intake: </= 50% for >/= 5 days (severe)  % Weight Loss: Weight loss does not meet criteria  Subcutaneous Fat Loss: Unable to assess  Muscle Loss: Unable to assess  Fluid Accumulation/Edema: Unable to assess  Malnutrition Diagnosis: Unable to determine due to not being able to meet pt    Previous Goals   Total average nutrition intake to meet 3135-1677 kcals/day (30-35 kcal/kg)+ and 129-172 grams protein/day (1.5-2 grams of pro/kg)+.  Evaluation: Not met    Previous Nutrition Diagnosis  Inadequate oral intake related to decreased appetite and frequently NPO for tests/procedures as evidenced by  pt consumed a four-day average of 731 kcals and 26 g protein daily which does not meet estimated needs of 6608-1150 kcals/day (30-35 kcal/kg)+ and 129-172  grams protein/day (1.5-2 grams of pro/kg)+.  Evaluation: Improving (slowly)    CURRENT NUTRITION DIAGNOSIS  Inadequate oral intake related to decreased appetite and frequently NPO for tests/procedures as evidenced by pt consumed a 7-day average of 1308 kcals and 55 g protein per day which does not meet estimated needs of 0298-7164 kcals/day (30-35 kcal/kg)+ and 129-172  grams protein/day (1.5-2 grams of pro/kg)+.      INTERVENTIONS  Implementation  Will re-order calorie counts for ongoing monitoring of intake    Goals  Total avg nutritional intake to meet a minimum of 30 kcal/kg and 1.5 g PRO/kg daily (per dosing wt 86 kg).    Monitoring/Evaluation  Progress toward goals will be monitored and evaluated per protocol.    Hi Ghotra RD LD  Weekday pager - [phone]  Weekend pager - [phone]

## 2017-09-05 NOTE — PLAN OF CARE
Problem: Goal Outcome Summary  Goal: Goal Outcome Summary  Outcome: No Change  Pt confused and became agitated with positioning and once this evening when he started yelling for his daughter. Pt was able to be calmed down by staff. Pt hypertensive this evening 180/96 which came down to 159/80 without tx. BG 103and 86 this shift. Pt c/o pain with repositioning but resolved after repositioned. Pt had one incontinent urine early in shift, and then called for assistance to void this evening; no BM. Abd drains in place with 5-10 ml output after irrigation. Ostomy in place. 2 gm K diet but pt did not eat this shift. Pt slept most of shift and confused when awake. Frequent rounds made on pt and bed alarm on for increased safety. Will continue to monitor and will notify team of changes.

## 2017-09-05 NOTE — PROGRESS NOTES
Care Coordiantor  D/I: Per Ramu from [facility],, pt's insurance is through his wife and is [facility]--per her benefit check pt has not met OOP or deductible for the year and will need to pay approx $19-20/N visit. Per BRINA sw, wife is requesting for pt to go to TCU d/t occasional confusion/ drain irrigation/new ileostomy/coccyx wound and glucose and insulin and management.  A: kurt to determine if pt will go to  TCU or a [facility] TCU  P: will follow and will update Ramu. Pt had been working with the [facility] nurse on stoma bags/cutting them to appropriate size last week. PLC @ 11:30am today for wife and mom--drain irrigation.

## 2017-09-05 NOTE — PLAN OF CARE
Problem: Goal Outcome Summary  Goal: Goal Outcome Summary  Outcome: No Change  VSS on RA. Blood sugar 91 at 0200. Denies pain or nausea. Tolerating a 2gm K+ diet. Pigtail drains on right and left side draining scant amounts of clear, serous drainage. Bigger drain on right side drained 55mL of serous fluid. PICC saline locked. Ileostomy appliance changed this morning because patient accidentally ripped it off in his sleep. Mucous fistula covered with dressing, no drainage. Voiding adequately on demand. Having conversations with himself during the night. Pleasant and cooperative for the most part. Able to ambulate with assist x1.

## 2017-09-06 ENCOUNTER — APPOINTMENT (OUTPATIENT)
Dept: OCCUPATIONAL THERAPY | Facility: CLINIC | Age: 52
End: 2017-09-06
Attending: TRANSPLANT SURGERY
Payer: COMMERCIAL

## 2017-09-06 LAB
ANION GAP SERPL CALCULATED.3IONS-SCNC: 9 MMOL/L (ref 3–14)
BASOPHILS # BLD AUTO: 0 10E9/L (ref 0–0.2)
BASOPHILS NFR BLD AUTO: 0.5 %
BUN SERPL-MCNC: 28 MG/DL (ref 7–30)
CALCIUM SERPL-MCNC: 8 MG/DL (ref 8.5–10.1)
CHLORIDE SERPL-SCNC: 101 MMOL/L (ref 94–109)
CMV DNA SPEC QL NAA+PROBE: DETECTED
CO2 SERPL-SCNC: 24 MMOL/L (ref 20–32)
CREAT SERPL-MCNC: 1.37 MG/DL (ref 0.66–1.25)
DIFFERENTIAL METHOD BLD: ABNORMAL
EOSINOPHIL # BLD AUTO: 0 10E9/L (ref 0–0.7)
EOSINOPHIL NFR BLD AUTO: 0.5 %
ERYTHROCYTE [DISTWIDTH] IN BLOOD BY AUTOMATED COUNT: 20.2 % (ref 10–15)
GFR SERPL CREATININE-BSD FRML MDRD: 54 ML/MIN/1.7M2
GLUCOSE BLDC GLUCOMTR-MCNC: 105 MG/DL (ref 70–99)
GLUCOSE BLDC GLUCOMTR-MCNC: 125 MG/DL (ref 70–99)
GLUCOSE BLDC GLUCOMTR-MCNC: 132 MG/DL (ref 70–99)
GLUCOSE BLDC GLUCOMTR-MCNC: 183 MG/DL (ref 70–99)
GLUCOSE BLDC GLUCOMTR-MCNC: 202 MG/DL (ref 70–99)
GLUCOSE SERPL-MCNC: 106 MG/DL (ref 70–99)
HCT VFR BLD AUTO: 21.3 % (ref 40–53)
HGB BLD-MCNC: 7.2 G/DL (ref 13.3–17.7)
IMM GRANULOCYTES # BLD: 0.1 10E9/L (ref 0–0.4)
IMM GRANULOCYTES NFR BLD: 1.3 %
LYMPHOCYTES # BLD AUTO: 0.6 10E9/L (ref 0.8–5.3)
LYMPHOCYTES NFR BLD AUTO: 15.3 %
MAGNESIUM SERPL-MCNC: 2.1 MG/DL (ref 1.6–2.3)
MCH RBC QN AUTO: 30.3 PG (ref 26.5–33)
MCHC RBC AUTO-ENTMCNC: 33.8 G/DL (ref 31.5–36.5)
MCV RBC AUTO: 90 FL (ref 78–100)
MONOCYTES # BLD AUTO: 0.7 10E9/L (ref 0–1.3)
MONOCYTES NFR BLD AUTO: 16.5 %
NEUTROPHILS # BLD AUTO: 2.6 10E9/L (ref 1.6–8.3)
NEUTROPHILS NFR BLD AUTO: 65.9 %
NRBC # BLD AUTO: 0 10*3/UL
NRBC BLD AUTO-RTO: 0 /100
PHOSPHATE SERPL-MCNC: 4.9 MG/DL (ref 2.5–4.5)
PLATELET # BLD AUTO: 73 10E9/L (ref 150–450)
POTASSIUM SERPL-SCNC: 4.7 MMOL/L (ref 3.4–5.3)
RBC # BLD AUTO: 2.38 10E12/L (ref 4.4–5.9)
SODIUM SERPL-SCNC: 133 MMOL/L (ref 133–144)
SPECIMEN SOURCE: ABNORMAL
TACROLIMUS BLD-MCNC: 9.8 UG/L (ref 5–15)
TME LAST DOSE: NORMAL H
WBC # BLD AUTO: 3.9 10E9/L (ref 4–11)

## 2017-09-06 PROCEDURE — 25000131 ZZH RX MED GY IP 250 OP 636 PS 637: Performed by: TRANSPLANT SURGERY

## 2017-09-06 PROCEDURE — 84100 ASSAY OF PHOSPHORUS: CPT | Performed by: STUDENT IN AN ORGANIZED HEALTH CARE EDUCATION/TRAINING PROGRAM

## 2017-09-06 PROCEDURE — 85025 COMPLETE CBC W/AUTO DIFF WBC: CPT | Performed by: STUDENT IN AN ORGANIZED HEALTH CARE EDUCATION/TRAINING PROGRAM

## 2017-09-06 PROCEDURE — 97530 THERAPEUTIC ACTIVITIES: CPT | Mod: GO

## 2017-09-06 PROCEDURE — 25000132 ZZH RX MED GY IP 250 OP 250 PS 637

## 2017-09-06 PROCEDURE — 25000128 H RX IP 250 OP 636: Performed by: NURSE PRACTITIONER

## 2017-09-06 PROCEDURE — 83735 ASSAY OF MAGNESIUM: CPT | Performed by: STUDENT IN AN ORGANIZED HEALTH CARE EDUCATION/TRAINING PROGRAM

## 2017-09-06 PROCEDURE — 25000131 ZZH RX MED GY IP 250 OP 636 PS 637: Performed by: NURSE PRACTITIONER

## 2017-09-06 PROCEDURE — 12000026 ZZH R&B TRANSPLANT

## 2017-09-06 PROCEDURE — 80197 ASSAY OF TACROLIMUS: CPT | Performed by: STUDENT IN AN ORGANIZED HEALTH CARE EDUCATION/TRAINING PROGRAM

## 2017-09-06 PROCEDURE — 25000132 ZZH RX MED GY IP 250 OP 250 PS 637: Performed by: PHYSICIAN ASSISTANT

## 2017-09-06 PROCEDURE — 36592 COLLECT BLOOD FROM PICC: CPT | Performed by: STUDENT IN AN ORGANIZED HEALTH CARE EDUCATION/TRAINING PROGRAM

## 2017-09-06 PROCEDURE — 40000133 ZZH STATISTIC OT WARD VISIT

## 2017-09-06 PROCEDURE — 40000802 ZZH SITE CHECK

## 2017-09-06 PROCEDURE — 00000146 ZZHCL STATISTIC GLUCOSE BY METER IP

## 2017-09-06 PROCEDURE — 25000128 H RX IP 250 OP 636: Performed by: PHYSICIAN ASSISTANT

## 2017-09-06 PROCEDURE — 25000132 ZZH RX MED GY IP 250 OP 250 PS 637: Performed by: TRANSPLANT SURGERY

## 2017-09-06 PROCEDURE — 80048 BASIC METABOLIC PNL TOTAL CA: CPT | Performed by: STUDENT IN AN ORGANIZED HEALTH CARE EDUCATION/TRAINING PROGRAM

## 2017-09-06 RX ORDER — TACROLIMUS 1 MG/1
1 CAPSULE ORAL
Status: DISCONTINUED | OUTPATIENT
Start: 2017-09-06 | End: 2017-09-08

## 2017-09-06 RX ADMIN — LOPERAMIDE HYDROCHLORIDE 4 MG: 2 CAPSULE ORAL at 14:02

## 2017-09-06 RX ADMIN — OXYCODONE HYDROCHLORIDE 5 MG: 5 TABLET ORAL at 02:20

## 2017-09-06 RX ADMIN — SODIUM BICARBONATE 650 MG TABLET 650 MG: at 09:13

## 2017-09-06 RX ADMIN — Medication 15 ML: at 21:19

## 2017-09-06 RX ADMIN — SODIUM CHLORIDE 1000 ML: 9 INJECTION, SOLUTION INTRAVENOUS at 09:14

## 2017-09-06 RX ADMIN — ONDANSETRON 4 MG: 2 INJECTION INTRAMUSCULAR; INTRAVENOUS at 18:40

## 2017-09-06 RX ADMIN — AMLODIPINE BESYLATE 10 MG: 10 TABLET ORAL at 09:13

## 2017-09-06 RX ADMIN — SODIUM BICARBONATE 650 MG TABLET 650 MG: at 21:19

## 2017-09-06 RX ADMIN — FLUDROCORTISONE ACETATE 0.1 MG: 0.1 TABLET ORAL at 09:13

## 2017-09-06 RX ADMIN — SODIUM BICARBONATE 650 MG TABLET 650 MG: at 13:58

## 2017-09-06 RX ADMIN — TACROLIMUS 1.5 MG: 1 CAPSULE ORAL at 09:13

## 2017-09-06 RX ADMIN — TACROLIMUS 1 MG: 1 CAPSULE ORAL at 17:52

## 2017-09-06 RX ADMIN — FUROSEMIDE 20 MG: 20 TABLET ORAL at 09:13

## 2017-09-06 RX ADMIN — INSULIN ASPART 2 UNITS: 100 INJECTION, SOLUTION INTRAVENOUS; SUBCUTANEOUS at 14:03

## 2017-09-06 RX ADMIN — AMOXICILLIN AND CLAVULANATE POTASSIUM 1 TABLET: 875; 125 TABLET, FILM COATED ORAL at 09:13

## 2017-09-06 RX ADMIN — PANTOPRAZOLE SODIUM 40 MG: 20 TABLET, DELAYED RELEASE ORAL at 09:13

## 2017-09-06 NOTE — PLAN OF CARE
Problem: Goal Outcome Summary  Goal: Goal Outcome Summary  Outcome: No Change  Pt slightly hypertensive this evening 161/87 but does not meet parameters for PRN BP meds. OVSS on room air.  and 98. Tylenol and oxy x 1 for generalized pain. Pt denies nausea and on 2 gm K diet with poor PO intake this shift. PICC saline locked. Abdominal drains flushed per MAR and dressings changed on day shift (CDI); small output. Ostomy in place with thick stool. Pt voiding. Up assist x 1 and ambulated to bathroom with walker. Up in chair this afternoon then rested in bed this evening. Pt declined repositioning once he laid in bed. Pt slightly confused when first waking from sleep but appropriate and calm this shift. All care explained and questions answered. Will continue to monitor and will notify team of changes.

## 2017-09-06 NOTE — PROGRESS NOTES
Immunosuppression Note:    Camacho Bhagat is a 51 year old male who is seen today  for immunosuppression management     I, Jovan Tran MD, I have examined the patient with the resident/PA/Fellow, discussed and agree with the note and findings.  I have reviewed today's vital signs, medications, labs and imaging. I reviewed the immunosuppression medications and levels. I spoke to the patient/family and explained below clinical details and answered all the questions     .    Transplant Surgery  Inpatient Daily Progress Note  09/06/2017    Assessment & Plan: Camacho Bhagat is a 52 year old male with history of CASTAÑEDA cirrhosis s/p liver transplant on 3/4/17. Admitted 7/4/17 from [facility] with sepsis secondary to colitis, taken to OR for initial ex-lap with findings of typhlitis in the right colon, wound left open with wound vac in place for re exploration and interval closure on 7/5/17. Concern for CMV colitis with low count CMV viremia, underwent colonoscopy on 7/21/17, biopsies obtained.  On 7/25/17 patient became septic with abdominal compartment syndrome.  CT noted new large heterogeneous right sided retroperitoneal gas and air tracking along duodenum, and patient underwent an ex-lap, right angelique-colectomy, end ileostomy, mucus fistula, partial omentectomy on 7/26/17 by colorectal surgery.  Post-op course complicated by retroperitoneal abscess/peritonitis requiring percutaneous drainage.    Graft Function: Good function. LFTs stable (checking every M, Th).   Immunosuppression Management:   CellCept discontinued (6/27/17) due to neutropenia.   Tacrolimus goal 6-8, level was 17.2 on 9/4, suspect level was drawn from PICC line (inaccurate). PM dose held last night, and level 9/6 is 6.8 (23-hour level). Will continue 1.5 mg BID and repeat Tacrolimus level pending.   Cardiorespiratory:   -Suspected RONNIE: Uses O2 overnight only for brief apnea while sleeping per nursing.  -HTN: SBP 140s-160s. Continue amlodipine 10  mg daily. Pt on daily Florinef daily for hyperkalemia.   Hematology: Pancytopenia present on admission, persists.  Contributing factors include medications and CMV infection.  -Anemia- Hemoglobin 7.2 (7.7). Dropped below 7 on 8/26 with bloody ileostomy output, scope completed, see below. Blood transfusion on 8/26 (2 units). HIT panel on 8/30 was negative. Was on ASA for HAT ppx; was stopped as pt ~ 6 months out from transplant.  -Leukopenia/neutropenia- WBC 3.9.  Received GCSF 8/20, 8/22-25.  -Thrombocytopenia-  Stable  GI:   -Neutropenic colitis on admission. Colonoscopy 7/21. Biopsy: resolving injury secondary to possible drug induced vs infectious.    -Bowel perforation: 7/25 CT scan abd/pelvis-new large heterogeneous right sided retroperitoneal gas and air tracking along duodenum.  No contrast extravasation.  No intraperitoneal air noted. Colorectal surgery performed Ex lap, right angelique-colectomy, end ileostomy, mucus fistula, partial omentectomy on 7/26. Findings no neida perforation, phlegmon/inflammation right colon. Colon pathology suggested colon perforation, negative for malignancy; CMV stain positive.    -Retroperitoneal abscess/ peritonitis: See ID section below.  -Diet:  On low K diet with aspiration precautions.  Magen counts.  -High output ileostomy:  Goal ileostomy output ~ 1200 cc in 24 hrs. Appreciate GI recs.  Ileoscopy and EGD 8/27 showed no active bleeding or ulceration, per GI bleeding suspected from ileostomy. Ostomy output decreased after tube feedings were stopped. Change Loperamide. Ostomy output 575 cc yesterday.   Fluid/Electrolytes/Renal:   -EJ: on admission, d/t ATN sec to sepsis. Now resolved, Cr stable in the 1.0-1.2 range.  Cr increasing again, likely due to pre-renal factors.  Will give 1L NS bolus.  -Hyperkalemia: Neph consulted earlier in hospitalization. 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  On daily bicarb, lasix, florinef, and a low K diet. Continue Florinef 0.1 mg and sodium bicarbonate. K today  is 4.7.   Endocrine: DM type 2. Endocrine consulted for glycemic management, glargine and NPH stopped after tube feedings were discontinued. Continue SSI QID and carb coverage with meals. BG well controlled.  Infectious disease: Afebrile.  WBC 3.9.   -Retroperitoneal abscess/peritonitis:  3 drains in place, continue to flush 12 Fr drains with 10 cc and 24 Fr with 20 cc flush.  Augmentin course to be completed today.  See note from 9/5 for more history and resolved issues.    -CMV viremia: Primary CMV infection.  (CMV R-D+) CMV colitis per pathology, peak serum 7/15 4225 IU/ml.   IV Ganciclovir (started 7/20).  CMV count fell to <137. Decreased Ganciclovir to 5mg/kg on 8/18.  Switched to valcyte 900mg daily 8/22, which was discontinued on 8/26.  Checking CMV quant weekly until 9/24 and then every other week for 2 months. Last check 8/31 CMV not detected.  -EBV viremia:  Peak serum 406,697 copies/ml on 7/18.   753 copies/ml on 8/15.  Check quant monthly, due 9/15.  -Cellulitis/dry gangrene:  Right 1st finger, clinically resolved.  -Reference ID note from 8/28 for final recs.    Neuro: Toxic metabolic encephalopathy secondary to infection, prolonged hospital stay.  Improved but not resolved.  Activity: Daily PT/OT, encourage pt to be OOB to chair at least TID (patient prefers up to side of bed).   Prophylaxis: DVT: SQ heparin d/c'd on 8/29 due to thrombocytopenia and bleeding issues.  Disposition: 7A. SW evaluating possible transfer to TCU prior to returning home. Planning for care conference with wife and mom this week- scheduled for 11 am tomorrow, Thursday  .  Medical Decision Making: Medium    PILLO/Fellow/Resident Provider: Lori Willingham PA-C    Faculty: Jovan Tran MD     __________________________________________________________________  Interval History:   Overnight events:   Appetite slightly improved, denies nausea. Frequently confused.    ROS:   A 10-point review of systems was negative except as  noted above.    Meds:    tacrolimus  1.5 mg Oral BID IS     loperamide  4 mg Oral BID     lidocaine  1 patch Transdermal Q24h    And     lidocaine   Transdermal Q24H    And     lidocaine   Transdermal Q8H     sodium bicarbonate  650 mg Per NG tube TID     furosemide  20 mg Oral Daily     insulin aspart  1-10 Units Subcutaneous TID AC     insulin aspart  1-7 Units Subcutaneous At Bedtime     amLODIPine  10 mg Oral Daily     pantoprazole  40 mg Oral Daily     menthol   Transdermal Q8H     insulin aspart   Subcutaneous TID w/meals     artificial saliva  15 mL Swish & Spit 4x Daily     fludrocortisone  0.1 mg Oral Daily     sodium chloride (PF)  10 mL Irrigation Q8H     sodium chloride (PF)  20 mL Irrigation Q6H     miconazole with skin protectant   Topical BID     sodium chloride (PF)  3 mL Intracatheter Q8H       Physical Exam:     Admit Weight: 94.2 kg (207 lb 11.2 oz) (SCALE 2)    Current vitals:   /83  Pulse 87  Temp 98.3  F (36.8  C)  Resp 20  Ht 1.829 m (6')  Wt 95.5 kg (210 lb 9.6 oz)  SpO2 91%  BMI 28.56 kg/m2    Vital sign ranges:    Temp:  [97.7  F (36.5  C)-98.6  F (37  C)] 98.3  F (36.8  C)  Pulse:  [87-92] 87  Heart Rate:  [87-92] 91  Resp:  [16-22] 20  BP: (147-165)/(75-90) 159/83  SpO2:  [88 %-98 %] 91 %  Patient Vitals for the past 24 hrs:   BP Temp Temp src Pulse Heart Rate Resp SpO2   09/06/17 0743 159/83 98.3  F (36.8  C) - - 91 20 91 %   09/06/17 0544 - - - - - - 97 %   09/06/17 0541 156/90 98.1  F (36.7  C) Oral - 90 18 (!) 88 %   09/06/17 0010 148/81 98.4  F (36.9  C) Axillary - 90 20 94 %   09/05/17 2025 161/87 98.3  F (36.8  C) Oral 87 87 22 90 %   09/05/17 1519 147/75 98.6  F (37  C) Oral - 92 20 98 %   09/05/17 1205 165/89 97.7  F (36.5  C) Oral 92 - 16 97 %     General Appearance: NAD  Skin: normal, warm, dry  Heart: RRR, well perfused  Lungs: Nonlabored resps on RA  Abdomen: soft, rounded, mild tender LQ unchanged. Ileostomy liquid slightly reddish brown no signs of bleeding  from stoma- output thicker today.  Mucus fistula covered. Midline incision, c/d/i.  Right abdominal drains: both serosang.  LLQ drain: serosang output.  : No vazquez, good UOP  Extremities: No edema. R index finger with necrotic finger.  Necrotic blackened areas on right 1st & 2nd toes and left 2nd toe.  Neurologic: A&O x4. No tremor      Data:   CMP    Recent Labs  Lab 09/06/17  0706 09/05/17  0643 09/04/17  0605  08/31/17  0545    130* 132*  < > 138   POTASSIUM 4.7 4.7 4.4  < > 4.8   CHLORIDE 101 99 100  < > 104   CO2 24 22 24  < > 28   * 89 129*  < > 132*   BUN 28 25 24  < > 24   CR 1.37* 1.20 1.18  < > 1.03   GFRESTIMATED 54* 63 65  < > 76   GFRESTBLACK 66 77 78  < > >90   JACOB 8.0* 8.0* 7.7*  < > 7.9*   MAG 2.1 2.0 1.7  < > 1.8   PHOS 4.9* 4.4 4.3  < > 4.8*   ALBUMIN  --   --  2.1*  --  1.8*   BILITOTAL  --   --  0.8  --  0.5   ALKPHOS  --   --  213*  --  204*   AST  --   --  43  --  31   ALT  --   --  15  --  14   < > = values in this interval not displayed.  CBC    Recent Labs  Lab 09/06/17  0706 09/05/17  0643   HGB 7.2* 7.7*   WBC 3.9* 4.9   PLT 73* 79*     COAGS  No lab results found in last 7 days.    Invalid input(s): XA   Urinalysis  Recent Labs   Lab Test  08/10/17   1752  08/08/17   1600   06/14/17   1508   04/11/16   1345   COLOR  Yellow  Yellow   < >   --    < >  Yellow   APPEARANCE  Slightly Cloudy  Slightly Cloudy   < >   --    < >  Clear   URINEGLC  Negative  Negative   < >   --    < >  30*   URINEBILI  Small   This is an unconfirmed screening test result. A positive result may be false.  *  Negative   < >   --    < >  Negative   URINEKETONE  Negative  Negative   < >   --    < >  Negative   SG  1.012  1.010   < >   --    < >  1.016   UBLD  Negative  Negative   < >   --    < >  Small*   URINEPH  5.0  5.0   < >   --    < >  5.0   PROTEIN  30*  30*   < >   --    < >  30*   NITRITE  Negative  Negative   < >   --    < >  Negative   LEUKEST  Negative  Negative   < >   --    < >  Negative    RBCU  0  3*   < >   --    < >  1   WBCU  10*  7*   < >   --    < >  1   UTPG   --    --    --   1.55*   --   0.41*    < > = values in this interval not displayed.

## 2017-09-06 NOTE — PLAN OF CARE
Problem: Goal Outcome Summary  Goal: Goal Outcome Summary  Outcome: No Change  Afebrile, B/P 140s-150s/80s, other vitals stable on 2 L 02 while sleeping.  at 0200. Pt alert, occasionally disoriented to time. Oxy given x 1 for back pain with relief. Pt voiding adequate amounts. 150 ml formed stool from illeostomy. Scant output from drains. Up with assist of 1.

## 2017-09-06 NOTE — PLAN OF CARE
Problem: Goal Outcome Summary  Goal: Goal Outcome Summary  OT 7A: Pt tolerating ~575 feet functional mobility to facilitate activity tolerance and strength as patient highly motivated with this. To facilitate independence with higher level cognitive tasks, pt completed 3 money management tasks-counting money, bill management, and filling out checkbook register. Pt unsuccessful on 3 attempts with counting money despite VCs, successful on 4/4 questions related to bill management, successful with filling out register but errors with calculating balance x 2 attempts. Pt with impairments in attention and problem solving and limited by handwriting legibility 2/2 necrotic index fingertip, increasing difficulty for making calculations. Recommended completing financial management with supervision of spouse and use of calculator to ensure accuracy. Rec: home with supervision/assist with high level IADLs such as med and financial management and meal prep.

## 2017-09-06 NOTE — PROGRESS NOTES
Social Work:    Care conference scheduled for tomorrow at 11am in pts room. Pts wife and mother plan to be there. Pts wife asked we address cognitive deficits in the care conference and how to care for him at home when he has confusion.    JOSEY Chowdhury, [address]   Ph: [phone], Pager: [phone]

## 2017-09-06 NOTE — PLAN OF CARE
Problem: Goal Outcome Summary  Goal: Goal Outcome Summary  Outcome: Improving  Camacho has been afebrile w stable VS this shift on room air when awake, 2L nasal canula when resting/sleeping. Denies pain or nausea. Tolerating a regular diet w good appetite, on calorie counts. BGs 105 & 189. Morning carb coverage not given mistakenly. Drain dressings all changed, very minimal output out of all drains after irrigant was subtracted. Pt up with SBA. Voiding adequate amounts. Plan to d/c to TCU soon. Will continue to monitor and notify team w concerns.

## 2017-09-07 LAB
ABO + RH BLD: NORMAL
ABO + RH BLD: NORMAL
ALBUMIN SERPL-MCNC: 1.8 G/DL (ref 3.4–5)
ALP SERPL-CCNC: 196 U/L (ref 40–150)
ALT SERPL W P-5'-P-CCNC: 12 U/L (ref 0–70)
ANION GAP SERPL CALCULATED.3IONS-SCNC: 6 MMOL/L (ref 3–14)
AST SERPL W P-5'-P-CCNC: 26 U/L (ref 0–45)
BASOPHILS # BLD AUTO: 0 10E9/L (ref 0–0.2)
BASOPHILS NFR BLD AUTO: 0.3 %
BILIRUB DIRECT SERPL-MCNC: 0.2 MG/DL (ref 0–0.2)
BILIRUB SERPL-MCNC: 0.4 MG/DL (ref 0.2–1.3)
BLD GP AB SCN SERPL QL: NORMAL
BLD PROD TYP BPU: NORMAL
BLD UNIT ID BPU: 0
BLD UNIT ID BPU: 0
BLOOD BANK CMNT PATIENT-IMP: NORMAL
BLOOD PRODUCT CODE: NORMAL
BLOOD PRODUCT CODE: NORMAL
BPU ID: NORMAL
BPU ID: NORMAL
BUN SERPL-MCNC: 26 MG/DL (ref 7–30)
CALCIUM SERPL-MCNC: 7.7 MG/DL (ref 8.5–10.1)
CHLORIDE SERPL-SCNC: 104 MMOL/L (ref 94–109)
CO2 SERPL-SCNC: 23 MMOL/L (ref 20–32)
CREAT SERPL-MCNC: 1.33 MG/DL (ref 0.66–1.25)
DIFFERENTIAL METHOD BLD: ABNORMAL
EOSINOPHIL # BLD AUTO: 0 10E9/L (ref 0–0.7)
EOSINOPHIL NFR BLD AUTO: 0.8 %
ERYTHROCYTE [DISTWIDTH] IN BLOOD BY AUTOMATED COUNT: 19.4 % (ref 10–15)
ERYTHROCYTE [DISTWIDTH] IN BLOOD BY AUTOMATED COUNT: 20.3 % (ref 10–15)
GFR SERPL CREATININE-BSD FRML MDRD: 56 ML/MIN/1.7M2
GLUCOSE BLDC GLUCOMTR-MCNC: 118 MG/DL (ref 70–99)
GLUCOSE BLDC GLUCOMTR-MCNC: 128 MG/DL (ref 70–99)
GLUCOSE BLDC GLUCOMTR-MCNC: 169 MG/DL (ref 70–99)
GLUCOSE BLDC GLUCOMTR-MCNC: 182 MG/DL (ref 70–99)
GLUCOSE BLDC GLUCOMTR-MCNC: 206 MG/DL (ref 70–99)
GLUCOSE SERPL-MCNC: 200 MG/DL (ref 70–99)
HCT VFR BLD AUTO: 16.8 % (ref 40–53)
HCT VFR BLD AUTO: 23.1 % (ref 40–53)
HGB BLD-MCNC: 5.5 G/DL (ref 13.3–17.7)
HGB BLD-MCNC: 6.8 G/DL (ref 13.3–17.7)
HGB BLD-MCNC: 7.9 G/DL (ref 13.3–17.7)
HGB BLD-MCNC: 7.9 G/DL (ref 13.3–17.7)
IMM GRANULOCYTES # BLD: 0 10E9/L (ref 0–0.4)
IMM GRANULOCYTES NFR BLD: 0.8 %
IRON SATN MFR SERPL: 20 % (ref 15–46)
IRON SERPL-MCNC: 28 UG/DL (ref 35–180)
LDH SERPL L TO P-CCNC: 393 U/L (ref 85–227)
LYMPHOCYTES # BLD AUTO: 0.5 10E9/L (ref 0.8–5.3)
LYMPHOCYTES NFR BLD AUTO: 14.4 %
MAGNESIUM SERPL-MCNC: 2 MG/DL (ref 1.6–2.3)
MCH RBC QN AUTO: 29.9 PG (ref 26.5–33)
MCH RBC QN AUTO: 31.1 PG (ref 26.5–33)
MCHC RBC AUTO-ENTMCNC: 32.7 G/DL (ref 31.5–36.5)
MCHC RBC AUTO-ENTMCNC: 34.2 G/DL (ref 31.5–36.5)
MCV RBC AUTO: 91 FL (ref 78–100)
MCV RBC AUTO: 91 FL (ref 78–100)
MONOCYTES # BLD AUTO: 0.6 10E9/L (ref 0–1.3)
MONOCYTES NFR BLD AUTO: 16 %
NEUTROPHILS # BLD AUTO: 2.5 10E9/L (ref 1.6–8.3)
NEUTROPHILS NFR BLD AUTO: 67.7 %
NRBC # BLD AUTO: 0 10*3/UL
NRBC BLD AUTO-RTO: 0 /100
NUM BPU REQUESTED: 2
PHOSPHATE SERPL-MCNC: 4.8 MG/DL (ref 2.5–4.5)
PLATELET # BLD AUTO: 71 10E9/L (ref 150–450)
PLATELET # BLD AUTO: 74 10E9/L (ref 150–450)
POTASSIUM SERPL-SCNC: 4.8 MMOL/L (ref 3.4–5.3)
PROT SERPL-MCNC: 6.5 G/DL (ref 6.8–8.8)
RBC # BLD AUTO: 1.84 10E12/L (ref 4.4–5.9)
RBC # BLD AUTO: 2.54 10E12/L (ref 4.4–5.9)
RETICS # AUTO: 96.3 10E9/L (ref 25–95)
RETICS/RBC NFR AUTO: 3.8 % (ref 0.5–2)
SODIUM SERPL-SCNC: 132 MMOL/L (ref 133–144)
SPECIMEN EXP DATE BLD: NORMAL
TIBC SERPL-MCNC: 138 UG/DL (ref 240–430)
TRANSFUSION STATUS PATIENT QL: NORMAL
WBC # BLD AUTO: 3.8 10E9/L (ref 4–11)
WBC # BLD AUTO: 4 10E9/L (ref 4–11)

## 2017-09-07 PROCEDURE — 83010 ASSAY OF HAPTOGLOBIN QUANT: CPT | Performed by: PHYSICIAN ASSISTANT

## 2017-09-07 PROCEDURE — 25000132 ZZH RX MED GY IP 250 OP 250 PS 637: Performed by: TRANSPLANT SURGERY

## 2017-09-07 PROCEDURE — P9016 RBC LEUKOCYTES REDUCED: HCPCS | Performed by: TRANSPLANT SURGERY

## 2017-09-07 PROCEDURE — 83540 ASSAY OF IRON: CPT | Performed by: PHYSICIAN ASSISTANT

## 2017-09-07 PROCEDURE — 85025 COMPLETE CBC W/AUTO DIFF WBC: CPT | Performed by: TRANSPLANT SURGERY

## 2017-09-07 PROCEDURE — 85027 COMPLETE CBC AUTOMATED: CPT | Performed by: STUDENT IN AN ORGANIZED HEALTH CARE EDUCATION/TRAINING PROGRAM

## 2017-09-07 PROCEDURE — 00000146 ZZHCL STATISTIC GLUCOSE BY METER IP

## 2017-09-07 PROCEDURE — 84100 ASSAY OF PHOSPHORUS: CPT | Performed by: STUDENT IN AN ORGANIZED HEALTH CARE EDUCATION/TRAINING PROGRAM

## 2017-09-07 PROCEDURE — 25000131 ZZH RX MED GY IP 250 OP 636 PS 637: Performed by: NURSE PRACTITIONER

## 2017-09-07 PROCEDURE — 85018 HEMOGLOBIN: CPT | Performed by: PHYSICIAN ASSISTANT

## 2017-09-07 PROCEDURE — 36415 COLL VENOUS BLD VENIPUNCTURE: CPT | Performed by: PHYSICIAN ASSISTANT

## 2017-09-07 PROCEDURE — 86923 COMPATIBILITY TEST ELECTRIC: CPT | Performed by: TRANSPLANT SURGERY

## 2017-09-07 PROCEDURE — 83550 IRON BINDING TEST: CPT | Performed by: PHYSICIAN ASSISTANT

## 2017-09-07 PROCEDURE — 25000131 ZZH RX MED GY IP 250 OP 636 PS 637: Performed by: PHYSICIAN ASSISTANT

## 2017-09-07 PROCEDURE — 83735 ASSAY OF MAGNESIUM: CPT | Performed by: STUDENT IN AN ORGANIZED HEALTH CARE EDUCATION/TRAINING PROGRAM

## 2017-09-07 PROCEDURE — 40000611 ZZHCL STATISTIC MORPHOLOGY W/INTERP HEMEPATH TC 85060: Performed by: PHYSICIAN ASSISTANT

## 2017-09-07 PROCEDURE — 85045 AUTOMATED RETICULOCYTE COUNT: CPT | Performed by: PHYSICIAN ASSISTANT

## 2017-09-07 PROCEDURE — 86850 RBC ANTIBODY SCREEN: CPT | Performed by: TRANSPLANT SURGERY

## 2017-09-07 PROCEDURE — 36592 COLLECT BLOOD FROM PICC: CPT | Performed by: PHYSICIAN ASSISTANT

## 2017-09-07 PROCEDURE — 36592 COLLECT BLOOD FROM PICC: CPT | Performed by: STUDENT IN AN ORGANIZED HEALTH CARE EDUCATION/TRAINING PROGRAM

## 2017-09-07 PROCEDURE — 86901 BLOOD TYPING SEROLOGIC RH(D): CPT | Performed by: TRANSPLANT SURGERY

## 2017-09-07 PROCEDURE — 25000132 ZZH RX MED GY IP 250 OP 250 PS 637: Performed by: PHYSICIAN ASSISTANT

## 2017-09-07 PROCEDURE — 80048 BASIC METABOLIC PNL TOTAL CA: CPT | Performed by: STUDENT IN AN ORGANIZED HEALTH CARE EDUCATION/TRAINING PROGRAM

## 2017-09-07 PROCEDURE — 12000026 ZZH R&B TRANSPLANT

## 2017-09-07 PROCEDURE — 80076 HEPATIC FUNCTION PANEL: CPT | Performed by: STUDENT IN AN ORGANIZED HEALTH CARE EDUCATION/TRAINING PROGRAM

## 2017-09-07 PROCEDURE — 83615 LACTATE (LD) (LDH) ENZYME: CPT | Performed by: PHYSICIAN ASSISTANT

## 2017-09-07 PROCEDURE — 86900 BLOOD TYPING SEROLOGIC ABO: CPT | Performed by: TRANSPLANT SURGERY

## 2017-09-07 RX ORDER — ONDANSETRON 4 MG/1
4 TABLET, FILM COATED ORAL EVERY 6 HOURS PRN
Status: DISCONTINUED | OUTPATIENT
Start: 2017-09-07 | End: 2017-09-08 | Stop reason: HOSPADM

## 2017-09-07 RX ADMIN — INSULIN ASPART 2 UNITS: 100 INJECTION, SOLUTION INTRAVENOUS; SUBCUTANEOUS at 09:27

## 2017-09-07 RX ADMIN — SODIUM BICARBONATE 650 MG TABLET 650 MG: at 20:08

## 2017-09-07 RX ADMIN — PANTOPRAZOLE SODIUM 40 MG: 20 TABLET, DELAYED RELEASE ORAL at 09:32

## 2017-09-07 RX ADMIN — MICONAZOLE NITRATE: 20 CREAM TOPICAL at 09:34

## 2017-09-07 RX ADMIN — FLUDROCORTISONE ACETATE 0.1 MG: 0.1 TABLET ORAL at 09:32

## 2017-09-07 RX ADMIN — LOPERAMIDE HYDROCHLORIDE 4 MG: 2 CAPSULE ORAL at 09:33

## 2017-09-07 RX ADMIN — INSULIN ASPART 3 UNITS: 100 INJECTION, SOLUTION INTRAVENOUS; SUBCUTANEOUS at 17:15

## 2017-09-07 RX ADMIN — INSULIN ASPART 2 UNITS: 100 INJECTION, SOLUTION INTRAVENOUS; SUBCUTANEOUS at 13:13

## 2017-09-07 RX ADMIN — TACROLIMUS 1 MG: 1 CAPSULE ORAL at 09:32

## 2017-09-07 RX ADMIN — INSULIN GLARGINE 5 UNITS: 100 INJECTION, SOLUTION SUBCUTANEOUS at 22:22

## 2017-09-07 RX ADMIN — FUROSEMIDE 20 MG: 20 TABLET ORAL at 09:33

## 2017-09-07 RX ADMIN — Medication 15 ML: at 17:09

## 2017-09-07 RX ADMIN — SODIUM BICARBONATE 650 MG TABLET 650 MG: at 14:54

## 2017-09-07 RX ADMIN — TACROLIMUS 1 MG: 1 CAPSULE ORAL at 18:07

## 2017-09-07 RX ADMIN — MICONAZOLE NITRATE: 20 CREAM TOPICAL at 20:08

## 2017-09-07 RX ADMIN — AMLODIPINE BESYLATE 10 MG: 10 TABLET ORAL at 09:32

## 2017-09-07 RX ADMIN — SODIUM BICARBONATE 650 MG TABLET 650 MG: at 09:31

## 2017-09-07 NOTE — PROGRESS NOTES
Calorie Counts  Intake recorded for: 9/6  Kcals: 1510  Protein: 55g  # Meals Recorded: 2 meals (First - 100% 2 pancakes with syrup, sausage eliane, 4 oz milk, 4 oz orange juice)     (Second - 100% baked potato chips, diet lemonade, 8 oz whole milk, 90% deli sandwich w/ roast beef, lettuce & tomato, 25% cantaloupe)  # Supplements Recorded: 100% 1 Ensure Plus

## 2017-09-07 NOTE — PROGRESS NOTES
"Care Coordinator: Care Conference  D: Attendees: Dr Tran, Lori WillinghamPA, Antionette Tolliver,NP, Meg hicks, Lakshmi Sauer's mom, Danica Sauer's wife and myself.  I: Plan: Camacho to d/c to his mom's home @ [address] [place] [address] on 9/8.  Mom can provide transportation.  Labs Q week(Monday) per Dr Tran--pt plans to go to [facility].  If pt needs blood (d/t low hgb), Dr Tran wants him to come to the [facility].  Keep 3 abdominal drains(need irrigating tid--Lakshmi and Danica have been to [facility]) for 2 more weeks then repeat a sinogram to determine when they can be pulled.  Per Dr Tran, pt is to be sent home with 2 abdominal binders and he is to wear one, loosely at night to prevent him from pulling at his drains--he agrees.  New ileostomy--Humaira feels comfortable caring for it and Camacho has been working with the [facility]--scripts sent to [facility] today ph: [phone] ( I called and left a message saying I was faxing the scripts and d/c date--Sindy called back and got info--they cannot ship to [place] is OON, but will ship to [facility] and should be there by early next week) F: [phone] (scripts for ostomy supplies/coccyx wound/drain irrigation syringes).  Humaira ordered 2 boxes (70 each) of split gauze dressings off of [employer].  Prognosis: Dr Tran thinks pt's \"brain fog\" is due to hospital psychosis and should clear in approx 2 weeks--it it gets worse he will need to be seen in the clinic and evaluated.  Plan for recovery: 3 months  Home CPAP: got machine in 2002 and has not worked properly since 2016--advised family to call the number on the sticker on the unit and ask to have it fixed or replaced. Pt sleeps better with CPAP.  Pulmonologist: Dr Cherry Magallon of [facility] [phone]--pt has not seen her for 2 years(6/2016)--Lori is going to call her--they need to see him in the clinic and Lori will have pt call and schedule and appointment. " Camacho/family informed they may need to see her OP and re-testing would be OP.  Home Nursing: Dr Tran agrees that 1-3 visits would be a good idea-- pt and family decline, $20 copay per visit. I have informed Ramu of [facility], today. I will leave their phone number and order in the d/c orders in case they would like to call and have nursing visits, Ramu aware.  P: Dr Tran encourages Camacho to eat good and avoid high sugar content foods. Be active with 5-6 walks/day. He will contact colo-rectal surgery in approx 6 weeks to determine when they can takedown his ileostomy.  Family will be with him 22 of 24 hours/day and will always have someone with him at night.  OP CC: Abril Doty updated.

## 2017-09-07 NOTE — PLAN OF CARE
Problem: Goal Outcome Summary  Goal: Goal Outcome Summary  PT-7A: Cancel. Pt politely declining therapy this pm secondary to feeling tired and reporting ambulating multiple times this day.  Pt to discharge tomorrow.

## 2017-09-07 NOTE — PROGRESS NOTES
Immunosuppression Note:    Camacho Bhagat is a 51 year old male who is seen today  for immunosuppression management     I, Jovan Tran MD, I have examined the patient with the resident/PA/Fellow, discussed and agree with the note and findings.  I have reviewed today's vital signs, medications, labs and imaging. I reviewed the immunosuppression medications and levels. I spoke to the patient/family and explained below clinical details and answered all the questions      Transplant Surgery  Inpatient Daily Progress Note  09/07/2017    Assessment & Plan: Camacho Bhagat is a 52 year old male with history of CASTAÑEDA cirrhosis s/p liver transplant on 3/4/17. Admitted 7/4/17 from [facility] with sepsis secondary to colitis, taken to OR for initial ex-lap with findings of typhlitis in the right colon, wound left open with wound vac in place for re exploration and interval closure on 7/5/17. Concern for CMV colitis with low count CMV viremia, underwent colonoscopy on 7/21/17, biopsies obtained.  On 7/25/17 patient became septic with abdominal compartment syndrome.  CT noted new large heterogeneous right sided retroperitoneal gas and air tracking along duodenum, and patient underwent an ex-lap, right angelique-colectomy, end ileostomy, mucus fistula, partial omentectomy on 7/26/17 by colorectal surgery.  Post-op course complicated by retroperitoneal abscess/peritonitis requiring percutaneous drainage.    Graft Function: Good function. LFTs stable (checking every M, Th).   Immunosuppression Management:   CellCept discontinued (6/27/17) due to neutropenia.   Tacrolimus goal 6-8, level was 17.2 on 9/4, suspect level was drawn from PICC line (inaccurate).  Dose now decreased to 1 mg BID.  Cardiorespiratory:   -Suspected RONNIE: Uses O2 overnight only for brief apnea while sleeping per nursing.  Pt uses CPAP at home, machine is not currently working.  Called patient's home pulmonologist, patient will need to be seen in clinic before  prescription may be renewed/new machine obtained  -HTN: -157. Continue amlodipine 10 mg daily. Pt on daily Florinef daily for hyperkalemia.   Hematology: Pancytopenia present on admission, persists.  Contributing factors include medications and CMV infection.  -Anemia- Hemoglobin 6.8 (7.2).  (Original lab draw of 5.5 this AM was likely erroneous).  Will transfuse 2 units pRBCs.  Will recheck hemoglobin tonight and tomorrow.  Will recheck iron studies, though will likely be falsely high with recent transfusion.  Hemolytic work up was negative beginning of July, will repeat work up- will try to add on if possible to get prior to tranfusion.  Last transfusion 9/2, and today, 9/7.   Dropped below 7 on 8/26 with bloody ileostomy output, scope completed, see below.  HIT panel on 8/30 was negative. Was on ASA for HAT ppx; was stopped as pt ~ 6 months out from transplant.  -Leukopenia/neutropenia- WBC 4.0.  Received GCSF 8/20, 8/22-25.  -Thrombocytopenia-  Stable  GI:   -Neutropenic colitis on admission. Colonoscopy 7/21. Biopsy: resolving injury secondary to possible drug induced vs infectious.    -Bowel perforation: 7/25 CT scan abd/pelvis-new large heterogeneous right sided retroperitoneal gas and air tracking along duodenum.  No contrast extravasation.  No intraperitoneal air noted. Colorectal surgery performed Ex lap, right angelique-colectomy, end ileostomy, mucus fistula, partial omentectomy on 7/26. Findings no neida perforation, phlegmon/inflammation right colon. Colon pathology suggested colon perforation, negative for malignancy; CMV stain positive.    -Retroperitoneal abscess/ peritonitis: See ID section below.  -Diet:  Liberalized diet on 9/6/17, will continue to monitor potassium.  Magen counts.  -High output ileostomy:  Goal ileostomy output ~ 1200 cc in 24 hrs. Appreciate GI recs.  Ileoscopy and EGD 8/27 showed no active bleeding or ulceration, per GI bleeding suspected from ileostomy. Ostomy output  decreased after tube feedings were stopped. Change Loperamide.   Fluid/Electrolytes/Renal:   -EJ: on admission, d/t ATN sec to sepsis. Now resolved, Cr stable in the 1.0-1.2 range.  Cr 1.3  -Hyperkalemia: Neph consulted earlier in hospitalization. 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  On daily bicarb, lasix, florinef, and a low K diet. Continue Florinef 0.1 mg and sodium bicarbonate. K today is 4.8.   Endocrine: DM type 2. Endocrine consulted for glycemic management, glargine and NPH stopped after tube feedings were discontinued. Continue SSI QID and carb coverage with meals. BG well controlled.  Per patient request, will restart lantus 5 units qHS, will likely need to increase as oral intake increases.  Infectious disease: Afebrile.  WBC 4.0.   -Retroperitoneal abscess/peritonitis:  3 drains in place, continue to flush 12 Fr drains with 10 cc and 24 Fr with 20 cc flush.  Augmentin course completed 9/6/17  See note from 9/5 for more history and resolved issues.    -CMV viremia: Primary CMV infection.  (CMV R-D+) CMV colitis per pathology, peak serum 7/15 4225 IU/ml.   IV Ganciclovir (started 7/20).  CMV count fell to <137. Decreased Ganciclovir to 5mg/kg on 8/18.  Switched to valcyte 900mg daily 8/22, which was discontinued on 8/26.  Checking CMV quant weekly until 9/24 and then every other week for 2 months. Last check 8/31 CMV not detected.  -EBV viremia:  Peak serum 406,697 copies/ml on 7/18.   753 copies/ml on 8/15.  Check quant monthly, due 9/15.  -Cellulitis/dry gangrene:  Right 1st finger, clinically resolved.  -Reference ID note from 8/28 for final recs.    Neuro: Toxic metabolic encephalopathy secondary to infection, prolonged hospital stay.  Improved but not resolved.  Activity: Daily PT/OT, encourage pt to be OOB to chair at least TID (patient prefers up to side of bed).   Prophylaxis: DVT: SQ heparin d/c'd on 8/29 due to thrombocytopenia and bleeding issues.  Disposition: 7A.  Care conference with wife and mom this AM,  will plan for discharge to home, tentatively planning for tomorrow if labs are all stable  .  Medical Decision Making: Medium    PILLO/Fellow/Resident Provider: Lori Willingham PA-C    Faculty: Jovan Tran MD     __________________________________________________________________  Interval History:   Overnight events:   Appetite slightly improved, denies nausea.  Confusion better today, family has reasonable expectations that confusion may still wax and wane for some time.    ROS:   A 10-point review of systems was negative except as noted above.    Meds:    tacrolimus  1 mg Oral BID IS     loperamide  4 mg Oral BID     lidocaine  1 patch Transdermal Q24h    And     lidocaine   Transdermal Q24H    And     lidocaine   Transdermal Q8H     sodium bicarbonate  650 mg Per NG tube TID     furosemide  20 mg Oral Daily     insulin aspart  1-10 Units Subcutaneous TID AC     insulin aspart  1-7 Units Subcutaneous At Bedtime     amLODIPine  10 mg Oral Daily     pantoprazole  40 mg Oral Daily     menthol   Transdermal Q8H     insulin aspart   Subcutaneous TID w/meals     artificial saliva  15 mL Swish & Spit 4x Daily     fludrocortisone  0.1 mg Oral Daily     sodium chloride (PF)  10 mL Irrigation Q8H     sodium chloride (PF)  20 mL Irrigation Q6H     miconazole with skin protectant   Topical BID     sodium chloride (PF)  3 mL Intracatheter Q8H       Physical Exam:     Admit Weight: 94.2 kg (207 lb 11.2 oz) (SCALE 2)    Current vitals:   /81  Pulse 82  Temp 98.4  F (36.9  C)  Resp 20  Ht 1.829 m (6')  Wt 94 kg (207 lb 4.8 oz)  SpO2 96%  BMI 28.11 kg/m2    Vital sign ranges:    Temp:  [98.1  F (36.7  C)-99.3  F (37.4  C)] 98.4  F (36.9  C)  Pulse:  [82-86] 82  Heart Rate:  [84-90] 85  Resp:  [16-20] 20  BP: (137-157)/(74-87) 146/81  SpO2:  [90 %-96 %] 96 %  Patient Vitals for the past 24 hrs:   BP Temp Temp src Pulse Heart Rate Resp SpO2   09/07/17 1216 146/81 98.4  F (36.9  C) - 82 - 20 96 %   09/07/17 1045  151/78 98.1  F (36.7  C) - 86 - 16 -   09/07/17 0756 151/80 98.1  F (36.7  C) - - 85 18 95 %   09/07/17 0423 155/87 98.4  F (36.9  C) Oral - 88 18 94 %   09/07/17 0220 137/74 - - - 84 - -   09/07/17 0027 157/83 98.7  F (37.1  C) Oral - 88 18 95 %   09/06/17 1946 157/82 99.3  F (37.4  C) Oral - 89 18 90 %   09/06/17 1630 - - - - - - 92 %   09/06/17 1514 152/79 98.3  F (36.8  C) Oral - 90 16 95 %     General Appearance: NAD  Skin: normal, warm, dry  Heart: RRR, well perfused  Lungs: Nonlabored resps on RA  Abdomen: soft, rounded, mild tender LQ unchanged. Ileostomy liquid slightly reddish brown no signs of bleeding from stoma- output thicker today.  Mucus fistula covered. Midline incision, c/d/i.  Right abdominal drains: both serous.  LLQ drain: serous.  : No vazquez, good UOP  Extremities: No edema. R index finger with necrotic finger.  Necrotic blackened areas on right 1st & 2nd toes and left 2nd toe.  Neurologic: A&O x4. No tremor      Data:   CMP    Recent Labs  Lab 09/07/17  0805 09/06/17  0706  09/04/17  0605   * 133  < > 132*   POTASSIUM 4.8 4.7  < > 4.4   CHLORIDE 104 101  < > 100   CO2 23 24  < > 24   * 106*  < > 129*   BUN 26 28  < > 24   CR 1.33* 1.37*  < > 1.18   GFRESTIMATED 56* 54*  < > 65   GFRESTBLACK 68 66  < > 78   JACOB 7.7* 8.0*  < > 7.7*   MAG 2.0 2.1  < > 1.7   PHOS 4.8* 4.9*  < > 4.3   ALBUMIN 1.8*  --   --  2.1*   BILITOTAL 0.4  --   --  0.8   ALKPHOS 196*  --   --  213*   AST 26  --   --  43   ALT 12  --   --  15   < > = values in this interval not displayed.  CBC    Recent Labs  Lab 09/07/17  0944 09/07/17  0805 09/06/17  0706   HGB 6.8* 5.5* 7.2*   WBC  --  4.0 3.9*   PLT  --  71* 73*     COAGS  No lab results found in last 7 days.    Invalid input(s): XA   Urinalysis  Recent Labs   Lab Test  08/10/17   1752  08/08/17   1600   06/14/17   1508   04/11/16   1345   COLOR  Yellow  Yellow   < >   --    < >  Yellow   APPEARANCE  Slightly Cloudy  Slightly Cloudy   < >   --    < >  Clear    URINEGLC  Negative  Negative   < >   --    < >  30*   URINEBILI  Small   This is an unconfirmed screening test result. A positive result may be false.  *  Negative   < >   --    < >  Negative   URINEKETONE  Negative  Negative   < >   --    < >  Negative   SG  1.012  1.010   < >   --    < >  1.016   UBLD  Negative  Negative   < >   --    < >  Small*   URINEPH  5.0  5.0   < >   --    < >  5.0   PROTEIN  30*  30*   < >   --    < >  30*   NITRITE  Negative  Negative   < >   --    < >  Negative   LEUKEST  Negative  Negative   < >   --    < >  Negative   RBCU  0  3*   < >   --    < >  1   WBCU  10*  7*   < >   --    < >  1   UTPG   --    --    --   1.55*   --   0.41*    < > = values in this interval not displayed.

## 2017-09-07 NOTE — PLAN OF CARE
Problem: Goal Outcome Summary  Goal: Goal Outcome Summary  OT 7A: Upon arrival, noted leakage from colostomy bag. RN notified. OT session cancelled and unable to check back per schedule demands.

## 2017-09-07 NOTE — PLAN OF CARE
Problem: Individualization  Goal: Patient Preferences  /81  Pulse 82  Temp 98.4  F (36.9  C)  Resp 20  Ht 1.829 m (6')  Wt 94 kg (207 lb 4.8 oz)  SpO2 96%  BMI 28.11 kg/m2  Denies pain that needs medication.  Denies nausea.   Blood glucose monitored and corrected as charted.   Hgb recheck was 6.8.  One of two units PRBCs given.  Recheck after the second unit.    Care conference completed and pt will be going home under his mother's care tomorrow.  Mother was able to demonstrate flushing the drains and changing the dressings.  She stated she would be slower, but feels confident about how to do them.   Some supplies given to the family for home care.   Anemia to be managed outpatient after discharge tomorrow. Discharge is dependent upon lab results. See MD note.

## 2017-09-07 NOTE — PLAN OF CARE
Problem: Goal Outcome Summary  Goal: Goal Outcome Summary  [id number]     Temp max 99.3, requiring 2L of O2 at times, mostly during sleep or when HOB is flat, cont pulse ox is on as O2 comes off easily. Sating low to mid 80s on RA w/ sleep, while awake on RA, sating low 90s. Encouraging IS and ambulation, though pt refused to go for a walk this evening or shower. Overall, voices frustration about still being in the hospital. Is withdrawn, and in low spirits. Reported nausea around dinner time, IV zofran given since only med available, perhaps team can order PO antiemetic tomorrow. Refused dinner d/t nausea, however, ate well the rest of the day. BG AC & HS, 132 and 128. Output from all three drains is minimal after subtracting irrigant. Scheduled immodium held d/t a PRN dose given later afternoon and stool becoming more formed. Had 100cc semi-formed/ loose stool from ileostomy. Pt A&Ox3 consistently throughout shift, disorientated to time at some points. Forgetful and slightly impulsive. Bed alarm on at night. Care conference scheduled for tomorrow at 1100. Family is aware and will be in attendance.

## 2017-09-07 NOTE — PLAN OF CARE
Problem: Goal Outcome Summary  Goal: Goal Outcome Summary  Outcome: Improving  Afebrile, B/P 150s/80s, other vitals stable on 2 L 02 overnight. Pt alert, occasionally disoriented to time. . Denies pain or nausea. On a potassium restricted diet. Scant output form drains. 450 ml out from illeostomy. Adequate urine output. Pt up with assist of 1 and a walker. Care conference at 11 am today.

## 2017-09-07 NOTE — PROVIDER NOTIFICATION
Notified CARLOS Cortés, of Hgb 5.5 after , Dora, reported it to me at 0900.    Plan is to redraw Hgb STAT and give PRBCs.    Pt asymptomatic.

## 2017-09-08 ENCOUNTER — APPOINTMENT (OUTPATIENT)
Dept: OCCUPATIONAL THERAPY | Facility: CLINIC | Age: 52
End: 2017-09-08
Attending: TRANSPLANT SURGERY
Payer: COMMERCIAL

## 2017-09-08 VITALS
TEMPERATURE: 98.8 F | BODY MASS INDEX: 27.79 KG/M2 | HEIGHT: 72 IN | DIASTOLIC BLOOD PRESSURE: 92 MMHG | SYSTOLIC BLOOD PRESSURE: 161 MMHG | RESPIRATION RATE: 16 BRPM | HEART RATE: 93 BPM | OXYGEN SATURATION: 92 % | WEIGHT: 205.2 LBS

## 2017-09-08 DIAGNOSIS — L03.319 CELLULITIS AND ABSCESS OF TRUNK: Primary | ICD-10-CM

## 2017-09-08 DIAGNOSIS — L02.219 CELLULITIS AND ABSCESS OF TRUNK: Primary | ICD-10-CM

## 2017-09-08 LAB
ANION GAP SERPL CALCULATED.3IONS-SCNC: 6 MMOL/L (ref 3–14)
BUN SERPL-MCNC: 27 MG/DL (ref 7–30)
CALCIUM SERPL-MCNC: 8.3 MG/DL (ref 8.5–10.1)
CHLORIDE SERPL-SCNC: 104 MMOL/L (ref 94–109)
CMV DNA SPEC NAA+PROBE-ACNC: NORMAL [IU]/ML
CMV DNA SPEC NAA+PROBE-LOG#: NORMAL {LOG_IU}/ML
CO2 SERPL-SCNC: 24 MMOL/L (ref 20–32)
COPATH REPORT: NORMAL
CREAT SERPL-MCNC: 1.21 MG/DL (ref 0.66–1.25)
ERYTHROCYTE [DISTWIDTH] IN BLOOD BY AUTOMATED COUNT: 18.9 % (ref 10–15)
FUNGUS SPEC CULT: NORMAL
GFR SERPL CREATININE-BSD FRML MDRD: 63 ML/MIN/1.7M2
GLUCOSE BLDC GLUCOMTR-MCNC: 176 MG/DL (ref 70–99)
GLUCOSE BLDC GLUCOMTR-MCNC: 200 MG/DL (ref 70–99)
GLUCOSE BLDC GLUCOMTR-MCNC: 214 MG/DL (ref 70–99)
GLUCOSE SERPL-MCNC: 111 MG/DL (ref 70–99)
HAPTOGLOB SERPL-MCNC: 152 MG/DL (ref 15–200)
HCT VFR BLD AUTO: 24.7 % (ref 40–53)
HGB BLD-MCNC: 8.4 G/DL (ref 13.3–17.7)
MAGNESIUM SERPL-MCNC: 2 MG/DL (ref 1.6–2.3)
MCH RBC QN AUTO: 30.9 PG (ref 26.5–33)
MCHC RBC AUTO-ENTMCNC: 34 G/DL (ref 31.5–36.5)
MCV RBC AUTO: 91 FL (ref 78–100)
PHOSPHATE SERPL-MCNC: 3.7 MG/DL (ref 2.5–4.5)
PLATELET # BLD AUTO: 74 10E9/L (ref 150–450)
POTASSIUM SERPL-SCNC: 5.3 MMOL/L (ref 3.4–5.3)
RBC # BLD AUTO: 2.72 10E12/L (ref 4.4–5.9)
SODIUM SERPL-SCNC: 134 MMOL/L (ref 133–144)
SPECIMEN SOURCE: NORMAL
SPECIMEN SOURCE: NORMAL
TACROLIMUS BLD-MCNC: 3.9 UG/L (ref 5–15)
TME LAST DOSE: ABNORMAL H
WBC # BLD AUTO: 3.7 10E9/L (ref 4–11)

## 2017-09-08 PROCEDURE — 97535 SELF CARE MNGMENT TRAINING: CPT | Mod: GO

## 2017-09-08 PROCEDURE — 40000802 ZZH SITE CHECK

## 2017-09-08 PROCEDURE — 25000131 ZZH RX MED GY IP 250 OP 636 PS 637: Performed by: NURSE PRACTITIONER

## 2017-09-08 PROCEDURE — 84100 ASSAY OF PHOSPHORUS: CPT | Performed by: STUDENT IN AN ORGANIZED HEALTH CARE EDUCATION/TRAINING PROGRAM

## 2017-09-08 PROCEDURE — 83735 ASSAY OF MAGNESIUM: CPT | Performed by: STUDENT IN AN ORGANIZED HEALTH CARE EDUCATION/TRAINING PROGRAM

## 2017-09-08 PROCEDURE — 40000133 ZZH STATISTIC OT WARD VISIT

## 2017-09-08 PROCEDURE — 80048 BASIC METABOLIC PNL TOTAL CA: CPT | Performed by: STUDENT IN AN ORGANIZED HEALTH CARE EDUCATION/TRAINING PROGRAM

## 2017-09-08 PROCEDURE — 85027 COMPLETE CBC AUTOMATED: CPT | Performed by: STUDENT IN AN ORGANIZED HEALTH CARE EDUCATION/TRAINING PROGRAM

## 2017-09-08 PROCEDURE — 40000902 ZZH STATISTIC WOC PT EDUCATION, 16-30 MIN

## 2017-09-08 PROCEDURE — 25000132 ZZH RX MED GY IP 250 OP 250 PS 637: Performed by: PHYSICIAN ASSISTANT

## 2017-09-08 PROCEDURE — 36592 COLLECT BLOOD FROM PICC: CPT | Performed by: STUDENT IN AN ORGANIZED HEALTH CARE EDUCATION/TRAINING PROGRAM

## 2017-09-08 PROCEDURE — 25000132 ZZH RX MED GY IP 250 OP 250 PS 637: Performed by: TRANSPLANT SURGERY

## 2017-09-08 PROCEDURE — 00000146 ZZHCL STATISTIC GLUCOSE BY METER IP

## 2017-09-08 PROCEDURE — 99211 OFF/OP EST MAY X REQ PHY/QHP: CPT

## 2017-09-08 PROCEDURE — 80197 ASSAY OF TACROLIMUS: CPT | Performed by: PHYSICIAN ASSISTANT

## 2017-09-08 RX ORDER — MICONAZOLE NITRATE 20 MG/G
CREAM TOPICAL 2 TIMES DAILY
Qty: 1 TUBE | Refills: 0 | Status: SHIPPED | OUTPATIENT
Start: 2017-09-08 | End: 2017-10-11

## 2017-09-08 RX ORDER — FUROSEMIDE 20 MG
20 TABLET ORAL EVERY OTHER DAY
Qty: 30 TABLET | Refills: 3 | Status: SHIPPED | OUTPATIENT
Start: 2017-09-09 | End: 2017-09-26

## 2017-09-08 RX ORDER — TACROLIMUS 1 MG/1
CAPSULE ORAL
Qty: 60 CAPSULE | Refills: 11 | Status: SHIPPED | OUTPATIENT
Start: 2017-09-08 | End: 2017-09-12

## 2017-09-08 RX ORDER — TACROLIMUS 0.5 MG/1
CAPSULE ORAL
Qty: 60 CAPSULE | Refills: 11 | Status: SHIPPED | OUTPATIENT
Start: 2017-09-08 | End: 2017-09-12

## 2017-09-08 RX ORDER — AMLODIPINE BESYLATE 5 MG/1
10 TABLET ORAL DAILY
Qty: 60 TABLET | Refills: 3 | Status: SHIPPED | OUTPATIENT
Start: 2017-09-08 | End: 2017-11-07

## 2017-09-08 RX ORDER — PANTOPRAZOLE SODIUM 40 MG/1
40 TABLET, DELAYED RELEASE ORAL DAILY
Qty: 30 TABLET | Refills: 5 | Status: SHIPPED | OUTPATIENT
Start: 2017-09-08 | End: 2017-10-16 | Stop reason: ALTCHOICE

## 2017-09-08 RX ORDER — FLUDROCORTISONE ACETATE 0.1 MG/1
0.1 TABLET ORAL DAILY
Qty: 30 TABLET | Refills: 3 | Status: SHIPPED | OUTPATIENT
Start: 2017-09-08 | End: 2017-09-25

## 2017-09-08 RX ORDER — CYCLOBENZAPRINE HCL 10 MG
10 TABLET ORAL 3 TIMES DAILY PRN
Qty: 90 TABLET | Refills: 0 | Status: SHIPPED | OUTPATIENT
Start: 2017-09-08 | End: 2018-09-17

## 2017-09-08 RX ORDER — FUROSEMIDE 20 MG
20 TABLET ORAL EVERY OTHER DAY
Status: DISCONTINUED | OUTPATIENT
Start: 2017-09-09 | End: 2017-09-08 | Stop reason: HOSPADM

## 2017-09-08 RX ORDER — TACROLIMUS 1 MG/1
1 CAPSULE ORAL 2 TIMES DAILY
Qty: 60 CAPSULE | Refills: 11 | Status: SHIPPED | OUTPATIENT
Start: 2017-09-08 | End: 2017-09-08

## 2017-09-08 RX ORDER — ACETAMINOPHEN 325 MG/1
650 TABLET ORAL EVERY 4 HOURS PRN
Qty: 100 TABLET | Refills: 3 | Status: ON HOLD | OUTPATIENT
Start: 2017-09-08 | End: 2017-10-27

## 2017-09-08 RX ORDER — LOPERAMIDE HCL 2 MG
4 CAPSULE ORAL 2 TIMES DAILY
Qty: 120 CAPSULE | Refills: 3 | Status: SHIPPED | OUTPATIENT
Start: 2017-09-08 | End: 2018-01-31

## 2017-09-08 RX ORDER — SODIUM BICARBONATE 650 MG/1
650 TABLET ORAL 3 TIMES DAILY
Qty: 90 TABLET | Refills: 5 | Status: ON HOLD | OUTPATIENT
Start: 2017-09-08 | End: 2017-10-27

## 2017-09-08 RX ADMIN — Medication 15 ML: at 13:07

## 2017-09-08 RX ADMIN — FUROSEMIDE 20 MG: 20 TABLET ORAL at 08:10

## 2017-09-08 RX ADMIN — INSULIN ASPART 2 UNITS: 100 INJECTION, SOLUTION INTRAVENOUS; SUBCUTANEOUS at 13:07

## 2017-09-08 RX ADMIN — FLUDROCORTISONE ACETATE 0.1 MG: 0.1 TABLET ORAL at 08:11

## 2017-09-08 RX ADMIN — LOPERAMIDE HYDROCHLORIDE 4 MG: 2 CAPSULE ORAL at 08:10

## 2017-09-08 RX ADMIN — AMLODIPINE BESYLATE 10 MG: 10 TABLET ORAL at 08:10

## 2017-09-08 RX ADMIN — TACROLIMUS 1 MG: 1 CAPSULE ORAL at 08:10

## 2017-09-08 RX ADMIN — SODIUM BICARBONATE 650 MG TABLET 650 MG: at 08:10

## 2017-09-08 RX ADMIN — PANTOPRAZOLE SODIUM 40 MG: 20 TABLET, DELAYED RELEASE ORAL at 08:10

## 2017-09-08 RX ADMIN — Medication 15 ML: at 08:10

## 2017-09-08 NOTE — PROVIDER NOTIFICATION
Notified provider that pt's blood pressure was 167/90, HR in 90s. Hydralazine parameters for SBP>180 and DBP>105. Provider did not want to change parameters or order any other blood pressure medications. Will continue to monitor.

## 2017-09-08 NOTE — PROGRESS NOTES
Social Work Services Discharge Note      Patient Name:  Camacho Bhagat     Anticipated Discharge Date:  9/8/2017    Discharge Disposition: Home to mothers home in [place], [place].    Following MD:  Evette Pennington NP.     Additional Services/Equipment Arranged:  None. Pt declined home care from [facility]. Pts family will transport him home.     Patient / Family response to discharge plan:  In agreement.     Persons notified of above discharge plan:  Medical team, pt and family.    Staff Discharge Instructions:  Please fax discharge orders and signed hard scripts for any controlled substances.  Please print a packet and send with patient.     CTS Handoff completed:  NO    Medicare Notice of Rights provided to the patient/family:  NO    JOSEY Chowdhury, ALIZE  7A   Ph: [phone], Pager: [phone]

## 2017-09-08 NOTE — PROGRESS NOTES
Social Work Services Progress Note    Hospital Day: 67  Date of Initial Social Work Evaluation:  8/1/2017  Collaborated with:  Pt, wife, pts mother, Lori Willingham PA-C, Felicia Tolliver CNP, Danica Shearer [facility], Dr. Tran.    Data:  PT/OT recommending TCU, pt would like to return home. Held care conference to discuss care in place if pt returns home.    Intervention:  Held care conference with pt, wife, and pts mother. Pt will be returning to his mothers house post discharge because it is more accessible and his mother can care for him during the day. Pts wife and daughter will also help care give for pt so he can return home. Discussed the possibility of home care. Pt refused this and stated he would follow up in his clinic to do lab draws. Also discussed pts cognitive issues and family feels they can handle this at home and that his ability to return home will help his recovery.    Assessment: Pt, wife, and pts mother seemed to have a good understanding of what to expect when pt returns home and they seem well equipped to be his caregivers.    Plan:    Anticipated Disposition: Home.    Barriers to d/c plan:  Medical stability. Pt will likely discharge tomorrow.    Follow Up:  SW will continue to follow if needs arise during the rest of pts hospitalization.    JOSEY Chowdhury, ALIZE  7A   Ph: [phone], Pager: [phone]

## 2017-09-08 NOTE — PLAN OF CARE
Problem: Goal Outcome Summary  Goal: Goal Outcome Summary  OT/7A: Pt declining ambulation as pt wanting to prepare for discharge. For increased independence to complete ADLs and improved fatigue management, educated pt on energy conservation strategies. Educated pt to organize day into heavy vs light tasks, schedule rest breaks, and examples of how to modify ADLs to increase endurance (ex. Completing dressing in sitting, sliding objects across the counter vs carrying, etc.). Educated pt on importance of prioritizing tasks. Handout provided, pt verbalized understanding.     Recommendation: Home with assist for higher level IADLs including meal prep and medication management as pt continues to demonstrate deficits in higher level cognitive skills.         Occupational Therapy Discharge Summary    Reason for therapy discharge:    Discharged to home.    Progress towards therapy goal(s). See goals on Care Plan [facility] electronic health record for goal details.  Goals partially met.  Barriers to achieving goals:   discharge from facility.    Therapy recommendation(s):    Continued therapy is recommended.  Rationale/Recommendations:  Pt would benefit from continued OT/PT to address remaining strength/activity tolerance deficts and higher level cognition deficits.

## 2017-09-08 NOTE — PROGRESS NOTES
Patient's belongings were packed up. Discharge paperwork was reviewed with prior nurse. PICC was discontinued earlier today. Patient's wife picked up his discharge medications. Patient discharged to home with his wife at 1645 this evening.

## 2017-09-08 NOTE — DISCHARGE INSTRUCTIONS
________________________________________________________  Discharge RN please fax discharge orders to home care agency: [facility]  ________________________________________________________      ________________________________________________________________________          [facility]              Name: Camacho Bhagat  Date: 9/5/17  To order your ostomy supplies     Call:                                                            [facility]  Ph. [phone] [phone]     Your Medical Supplier will need your insurance information and surgeon's name, phone and fax number     Clinic:                                                                                                 Phone                                              Fax  Colorectal Surgery:                                                             [phone]  Verbal Order X 1 Month per:                                                                                               Johnna Hansen RN CWOCN                                                                        Authorizing MD: Sara Huffman MD     Request the following supplies:       [place]   1 piece flat fecal pouch with filter opaque with viewing option ( # 1158)                                                                     Accessories               2  barrier ring #[id number]                                                                                               Adapt powder #7906        Change your pouch twice a week, more often if leaking.    If you are cutting a hole in the wafer of your pouch, recheck stoma size and adjust pouch opening as needed every week     . Call the Ostomy Nurse at [facility]                                                                                               [address] [place] [address], [place] : [phone]    Schedule a follow-up visit in 4 to 6 weeks after your surgery, sooner if having problems Bring a complete set of pouch-changing supplies to this visit       Problems you should Report  - The stoma turns blue or darker in color.  - Cuts or sores around the stoma.  - Red, raw or painful skin around the stoma.  - Any bulging of the skin around the stoma.  - A pouch that leaks every day.  - Problems making the right size hole in the pouch wafer.    Please call with any questions or concerns.    Camacho--[employer] has to ship the supplies to your [facility]--they will be shipped by early next week 9/11 or by 9/12

## 2017-09-08 NOTE — PROGRESS NOTES
Calorie Counts    Intake Recorded For: 9/7 Kcals: 995 Protein: 40g    # Meals Recorded: 1 meal (100% hash browns, eggs)    # Supplements Recorded: 100% of 1 Ensure Shake, 1 Glucerna

## 2017-09-08 NOTE — PLAN OF CARE
Problem: Goal Outcome Summary  Goal: Goal Outcome Summary  PT 7A: patient refusing therapy this day, reporting he is trying to get everything ready for discharge and does not want to get out of bed.     Physical Therapy Discharge Summary    Reason for therapy discharge:    Discharged to home.    Progress towards therapy goal(s). See goals on Care Plan in [facility] electronic health record for goal details.  Goals partially met.  Barriers to achieving goals:   discharge from facility.    Therapy recommendation(s):    Continued therapy is recommended.  Rationale/Recommendations:  Continue to recommend HH therapies to progress safety and indep with functional mobility; pt refusing HH therapies at this time.

## 2017-09-08 NOTE — PLAN OF CARE
Problem: Goal Outcome Summary  Goal: Goal Outcome Summary  Outcome: No Change  Afebrile, B/P 150s-160s/80s, provider aware, no new orders. Alert and oriented x 4. Other vitals stable on 2 L 02. . Denies pain or nausea. R PICC saline locked. Scant output from drains, irrigated per orders. Voiding adequate amounts. 300 out from illeostomy. Pt needs reinforced education on emptying ostomy. Plan is to discharge home today.

## 2017-09-08 NOTE — PROGRESS NOTES
Pt will discharge home this evening without home care services (declined by pt and pt's family). Wife, Danica, is currently picking up discharge medications from [facility] discharge pharmacy. Insulin prescriptions sent with pt so that he can refill at his home pharmacy if need be. Leftover insulin pens that were used in the hospital were sent down to d/c pharmacy to be labeled for outpatient use. AVS was reviewed with pt and pt's wife. Supplies were sent with the pt. A medical supply company will be by on Monday to supply pt with additional supplies. This information can be found on the AVS. In addition to the AVS, writer provided additional written discharge instructions/ educational material. Writer also provided extensive demonstration and utilized teach back. Pt and pt's wife state that all questions were answered and that they understand and feel confident with the discharge plan. Pt will follow-up in the clinic with Dr. Forde on Monday. Pt encouraged to utilize resources outlined in the AVS for any concerns or questions that may arise.

## 2017-09-08 NOTE — PROGRESS NOTES
Care Coordinator  D/I: [facility] [phone]--do NOT have prefilled 10cc sterile irrigation syringes--[facility] d/c pharmacy does per [facility] and Evette Pennington NP, has ordered them under d/c meds--bedside nurse Lu aware--she is aware he needs to d/c with 2 abdominal binders and supplies until Tuesday.  I verified [place] address on facesheet and informed Camacho and his mom Lakshmi that the supplies have to be shipped there and that is ok. I asked Camacho and Lakshmi again, if they would like a HHN to follow and they decline(they will decide and will call [facility]  if they decide different)--- and would like lab draws scheduled at [facility] OP lab approx 11am (takes meds 11a/11p) and to coordinate on Monday RTC with Dr Tran (he is not here on 9/18)--he can decide on 9/11 visit if pt needs to be seen that day or to schedule with Dr Dacosta or Dora Elizabeth. Pt needs OP lab draws M/Th--pt and Lakshmi are aware.  A: d/c with OP follow up  P: d/c plan to be called to OP CC: Abril Doty by Evette Pennington NP. I will sent CTS handoff.  I will schedule Op lab draws when I know Dr Tran appoinment times.

## 2017-09-08 NOTE — PROGRESS NOTES
"[facility]  WO Nurse Inpatient Adult Pressure INJURY (PI) Wound Assessment     Follow up assessment-  Ileostomy    Follow up assessment of PI on- Sacrum      Data:   Patient History:      per MD note(s): This is a 51 year old male with complex PMH of CASTAÑEDA cirrhosis s/p Liver transplant Mar 2017.  Was admitted on 2014 with abdominal pain, sepsis, CT at OSH showed \"right colonic wall thickening suggesting colitis\" underwent Ex-Lap and washout for neutropenic colitis, intra-op was noted to have inflammed cecum and ascending colon with murky fluid.  Abdomen was left open at the time and was closed on 2017.  Some concern for CMV colitis with low count CMV viremia/GI PTLD and subsequently underwent colonoscopy on :  No colitis was seen on visualized portions of mucosa.     Current mattress:  Pulsate mattress   Current pressure relieving devices: Foam dressing, pt able to turn independently     Moisture Management:  Ileostomy,      Current Diet / Nutrition:       Active Diet Order      Regular Diet Adult      Diet      Kwasi Score  Av.5  Min: 10  Max: 22         Labs:   Recent Labs   Lab Test  17   0657   17   0805   17   1406   17   0650   17   0316   ALBUMIN   --    --   1.8*   < >   --    < >   --    < >   --    HGB  8.4*   < >  5.5*   < >   --    < >  6.9*   < >  7.1*   INR   --    --    --    --   1.12   --    --    < >  1.80*   WBC  3.7*   < >  4.0   < >   --    < >  2.0*   < >  0.8*   A1C   --    --    --    --    --    --    --    --   Canceled, Test credited   Below Assay Range  NOTIFIED LEONARD ONEILL AT 0538 ON 17 BY CHRISSY     CRP   --    --    --    --    --    --   140.0*   < >   --     < > = values in this interval not displayed.                                                                                                          Pressure Injury Assessment  (location #1):   Sacrum  Wound History:   Pt was transferred from  to  on  now " transferred back to  on 7/29. Had multiple procedures throughout the day on 7/25/17 per RN. Pt previously confused with refusals to turn and reposition.            Pic taken-8/29/17      Wound Base: 100% visible follicular buds, deep dermis    Specific Dimensions (length x width x depth, in cm) :  1.6 x 1.2 x 0.1 cm     Tunneling:  N/A    Undermining: N/A    Palpation of the wound bed:  Firm on bone    Slough appearance: None    Eschar appearance:  none    Periwound Skin: up to 1 cm of maceration with peeling.      Temperature  normal     Drainage: small serous         Odor: none    Pain:  Painful         Intervention:     Patient's chart evaluated.      Kwasi Interventions:  Current Kwasi Interventions and Care Plan reviewed and updated, appropriate at this time.    Wound was assessed.    Wound Care: was done: per POC mentioned below.     Orders  reviewed    Supplies  Reviewed    Discussed plan of care with Nurse           Assessment:     Pressure Injury (PI) located on Sacrum: healing 2  Status: wound  Follow up assessment, Symptomatic, slowly healing        New ileostomy and Mucus fistula, stable.          Plan:     Nursing to notify the Provider(s) and re-consult the [facility] Nurse if wound(s) deteriorate(s).  Plan of care for wound located on Sacrum every other day and PRN :   Cleanse the area with NS and pat dry.  Apply No sting barrier to periwound skin.  Apply triad paste lightly only on open areas.  Cover wound with Sacral Mepilex (#[id number])  Change dressing every other day and PRN.  Turn and reposition Q 2hrs.  Ensure pt has Adolph-cushion while sitting up in the chair.  Ensure pt is on pulsate mattress.   FYI- Only use Covidien pad no  pad.    Mucus fistula- Cleanse Mucus fistula with NS and pat dry.  Cover with a piece of adaptic and cover with premopore dressing.  Change dressing daily      BL buttocks and inner thigh BID-     Recommend to discontinue antifungal cream.    [facility] Nurse will return:  "Friday  [facility] Nurse Inpatient Ostomy Assessment       Follow up assessment of ostomy and needs for:  Ileostomy and Mucus fistula       Data:   History:    This is a 51 year old male with complex PMH of CASTA[place]EDA cirrhosis s/p Liver transplant Mar 2017.  Was admitted on 7/4/2014 with abdominal pain, sepsis, CT at OSH showed \"right colonic wall thickening suggesting colitis\" underwent Ex-Lap and washout for neutropenic colitis, intra-op was noted to have inflammed cecum and ascending colon with murky fluid.  Abdomen was left open at the time and was closed on 7/5/2017.  Some concern for CMV colitis with low count CMV viremia/GI PTLD and subsequently underwent colonoscopy on 7/21:  No colitis was seen on visualized portions of mucosa.  Exam sub-optimal as colon filled with solid stool.  Random biopsies obtained.  On 7/25/2017 pt became more tachycardic, increasing O2 needs and altered, pt was intubated, hypotension responsive to IVFs, worsening kidney function.  Has not required pressors.  CT was obtained which showed a new large heterogeneous right sided retroperitoneal gas and air tracking along duodenum.  No contrast extravasation.  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  Despite conservative management with bowel rest and IV abx, pt continued to spike fevers.  Now s/p Exploratory laparotomy, right angelique-colectomy, end ileostomy, mucus fistula, partial omentectomy on 7/26.    Type of ostomy:  Ileostomy and Mucus fistula  Stoma assessment:   ? stoma:  1 1/4\" , viable, pink, pale, round, moist, and protruberant  ? A bridge in place?: No  ? Stent(s) in place?: Not applicable,   Mucocutaneous Junction (MCJ):  Intact   Peristomal skin:  Intact, minimal erythema without satellite lesions from 7-10 o'clock (protected with No sting film barrier)  Abdominal assessment: soft   ? N/G still in place?:  no  Output:  Flatus and thick paste consistency brown fecal material     I/O last 3 " completed shifts:  In: 80 [I.V.:40; Other:40]  Out: 2340 [Urine:1725; Drains:115; Stool:500]     Current pouching system:  [place], 1 piece flat stock pouch placed by nursing staff ? Without barrier ring or ostomy paste with minimal melting.   Pain:  none  ? Is pt still on a PCA? No  Diet:       Active Diet Order      Regular Diet Adult      Diet            Intervention:     Patient's chart evaluated.      Assessments done today:  Stoma, peristomal skin, and learning needs  ? Participants: pt only today. His wife and mother has been taught on ostomy management  ?  Educational intervention: method: Demonstrated pouch change to the patient. He was able to trace, cleanse peristomal skin and apply barrier ring on to the wafer.  Prepared for discharge by: No discharge preparations started         Assessment:     Stoma assessment: viable and healthy    Complications: None    Learning needs/ comprehension: pt is attentive and willing to learn.  Was able to perform all steps of the pouch change with prompts and min-mod assist     Is pt able to demonstrate: 1. how to empty their pouch?  yes: Demonstrated today  2. How to read and write down correct I and O's?   No: Demonstrated today    Effectiveness of current pouching/ supply plan: > 72 hrs         Plan:     Plan:   ? Current pouching system: 1 pc [place] flat CTF pouch with barrier ring    Education:  ? Educational needs: practice with How to empty pouch, Removal of pouch, Preparation of new pouch, Cutting out or evaluating a pattern, Applying paste or rings, Applying appliance to abdomen, Peristomal skin care, Diet and hydration / fluid balance, Odor / flatus management and Lifestyle Adjustments    ? Continue to prepare for discharge:  Supplies ordered, Completed supply list, Registered for samples from , Prescriptions or note left on chart for MD to sign/complete, Prepared for discharge to home with homecare, Discussed making a [facility] Nurse outpatient  appointment upon discharge, Discussed when to follow up with a WO Nurse in the future and Discussed how/ where to order supplies   Do WOC Nurse recommend home care?  Yes  Plan for ileostomy and Mucus fistula: RN to change pouch twice a week (M/F)  Supplies Needed  [place] 1 pc flat CTF  Barrier ring- (#[id number])     Ileostomy care- Change pouch twice a week (M/F)  1. Gather all supplies and remove old pouch gently.  2. Cleanse peristomal skin with w arm water and soft wash cloth or gauze and dry thoroughly.  3. Cut the wafer to fit to stoma or use given pattern, apply barrier ring around the cutting surface and apply centering the stoma.  4. Attach the pouch  5. Massage around it for better adherence.    Face to face time- 40 mins

## 2017-09-09 LAB
MYCOBACTERIUM SPEC CULT: NORMAL
MYCOBACTERIUM SPEC CULT: NORMAL
SPECIMEN SOURCE: NORMAL

## 2017-09-11 ENCOUNTER — CARE COORDINATION (OUTPATIENT)
Dept: CARE COORDINATION | Facility: CLINIC | Age: 52
End: 2017-09-11

## 2017-09-11 ENCOUNTER — OFFICE VISIT (OUTPATIENT)
Dept: TRANSPLANT | Facility: CLINIC | Age: 52
End: 2017-09-11
Attending: TRANSPLANT SURGERY
Payer: COMMERCIAL

## 2017-09-11 VITALS
TEMPERATURE: 98.7 F | HEIGHT: 72 IN | BODY MASS INDEX: 27.58 KG/M2 | HEART RATE: 95 BPM | SYSTOLIC BLOOD PRESSURE: 155 MMHG | DIASTOLIC BLOOD PRESSURE: 86 MMHG | WEIGHT: 203.6 LBS | OXYGEN SATURATION: 97 % | RESPIRATION RATE: 18 BRPM

## 2017-09-11 DIAGNOSIS — Z94.4 HISTORY OF LIVER TRANSPLANT (H): Primary | ICD-10-CM

## 2017-09-11 DIAGNOSIS — Z79.60 LONG-TERM USE OF IMMUNOSUPPRESSANT MEDICATION: ICD-10-CM

## 2017-09-11 DIAGNOSIS — Z94.4 HISTORY OF LIVER TRANSPLANT (H): ICD-10-CM

## 2017-09-11 DIAGNOSIS — D72.819 DECREASED WHITE BLOOD CELL COUNT: ICD-10-CM

## 2017-09-11 LAB
ALBUMIN SERPL-MCNC: 2.3 G/DL (ref 3.4–5)
ALP SERPL-CCNC: 206 U/L (ref 40–150)
ALT SERPL W P-5'-P-CCNC: 22 U/L (ref 0–70)
ANION GAP SERPL CALCULATED.3IONS-SCNC: 8 MMOL/L (ref 3–14)
ANISOCYTOSIS BLD QL SMEAR: NORMAL
AST SERPL W P-5'-P-CCNC: 48 U/L (ref 0–45)
BASOPHILS # BLD AUTO: 0 10E9/L (ref 0–0.2)
BASOPHILS NFR BLD AUTO: 0.9 %
BILIRUB DIRECT SERPL-MCNC: 0.3 MG/DL (ref 0–0.2)
BILIRUB SERPL-MCNC: 0.6 MG/DL (ref 0.2–1.3)
BUN SERPL-MCNC: 30 MG/DL (ref 7–30)
CALCIUM SERPL-MCNC: 8.4 MG/DL (ref 8.5–10.1)
CHLORIDE SERPL-SCNC: 105 MMOL/L (ref 94–109)
CO2 SERPL-SCNC: 23 MMOL/L (ref 20–32)
CREAT SERPL-MCNC: 1.38 MG/DL (ref 0.66–1.25)
DIFFERENTIAL METHOD BLD: NORMAL
EOSINOPHIL # BLD AUTO: 0.1 10E9/L (ref 0–0.7)
EOSINOPHIL NFR BLD AUTO: 2.7 %
ERYTHROCYTE [DISTWIDTH] IN BLOOD BY AUTOMATED COUNT: 18.4 % (ref 10–15)
GFR SERPL CREATININE-BSD FRML MDRD: 54 ML/MIN/1.7M2
GLUCOSE SERPL-MCNC: 244 MG/DL (ref 70–99)
HCT VFR BLD AUTO: 26.9 % (ref 40–53)
HGB BLD-MCNC: 8.7 G/DL (ref 13.3–17.7)
LYMPHOCYTES # BLD AUTO: 0.9 10E9/L (ref 0.8–5.3)
LYMPHOCYTES NFR BLD AUTO: 23.9 %
MAGNESIUM SERPL-MCNC: 1.6 MG/DL (ref 1.6–2.3)
MCH RBC QN AUTO: 29.9 PG (ref 26.5–33)
MCHC RBC AUTO-ENTMCNC: 32.3 G/DL (ref 31.5–36.5)
MCV RBC AUTO: 92 FL (ref 78–100)
MONOCYTES # BLD AUTO: 0.2 10E9/L (ref 0–1.3)
MONOCYTES NFR BLD AUTO: 6.2 %
NEUTROPHILS # BLD AUTO: 2.4 10E9/L (ref 1.6–8.3)
NEUTROPHILS NFR BLD AUTO: 66.3 %
PHOSPHATE SERPL-MCNC: 4.5 MG/DL (ref 2.5–4.5)
PLATELET # BLD AUTO: 91 10E9/L (ref 150–450)
POTASSIUM SERPL-SCNC: 5.6 MMOL/L (ref 3.4–5.3)
PROT SERPL-MCNC: 7.6 G/DL (ref 6.8–8.8)
RBC # BLD AUTO: 2.91 10E12/L (ref 4.4–5.9)
SODIUM SERPL-SCNC: 136 MMOL/L (ref 133–144)
TACROLIMUS BLD-MCNC: <3 UG/L (ref 5–15)
TME LAST DOSE: ABNORMAL H
WBC # BLD AUTO: 3.6 10E9/L (ref 4–11)

## 2017-09-11 PROCEDURE — 36415 COLL VENOUS BLD VENIPUNCTURE: CPT | Performed by: INTERNAL MEDICINE

## 2017-09-11 PROCEDURE — 85027 COMPLETE CBC AUTOMATED: CPT | Performed by: INTERNAL MEDICINE

## 2017-09-11 PROCEDURE — 80197 ASSAY OF TACROLIMUS: CPT | Performed by: INTERNAL MEDICINE

## 2017-09-11 PROCEDURE — 85004 AUTOMATED DIFF WBC COUNT: CPT | Performed by: INTERNAL MEDICINE

## 2017-09-11 PROCEDURE — 84100 ASSAY OF PHOSPHORUS: CPT | Performed by: INTERNAL MEDICINE

## 2017-09-11 PROCEDURE — 83735 ASSAY OF MAGNESIUM: CPT | Performed by: INTERNAL MEDICINE

## 2017-09-11 PROCEDURE — 99211 OFF/OP EST MAY X REQ PHY/QHP: CPT

## 2017-09-11 PROCEDURE — 80048 BASIC METABOLIC PNL TOTAL CA: CPT | Performed by: INTERNAL MEDICINE

## 2017-09-11 PROCEDURE — 80076 HEPATIC FUNCTION PANEL: CPT | Performed by: INTERNAL MEDICINE

## 2017-09-11 RX ORDER — OXYCODONE HYDROCHLORIDE 10 MG/1
5 TABLET ORAL EVERY 4 HOURS PRN
Qty: 30 TABLET | Refills: 0 | Status: ON HOLD | OUTPATIENT
Start: 2017-09-11 | End: 2017-10-27

## 2017-09-11 ASSESSMENT — PAIN SCALES - GENERAL: PAINLEVEL: MODERATE PAIN (4)

## 2017-09-11 NOTE — LETTER
9/11/2017       RE: Camacho Bhagat  [address] [facility] [id number]     Dear Colleague,    Thank you for referring your patient, Camacho Bhagat, to the [facility] SOLID ORGAN TRANSPLANT at [facility]. Please see a copy of my visit note below.      Transplant Surgery -OUTPATIENT IMMUNOSUPPRESSION PROGRESS NOTE    Date of Visit: 09/11/2017    Transplants:  3/4/2017 (Liver); Postoperative day:  191  ASSESMENT AND PLAN:  1.Graft Function: Liver allograft: no rejection or technical problems.    2.Immunosuppression Management: keep tacrolimus levels at 8  3.Hypertension: ok  4.Renal Function: good  5.Lab frequency: twice weekly  6.Other:  Patient has 3 drains: all of them draining 30-100cc/day, being flused twice daily; fever low grade off antibitoics  Eating somewhat, activity good, recommend lab on September 14     Date: September 11, 2017    Transplant:  [x]                             Liver []                              Kidney []                             Pancreas []                              Other:             Chief Complaint:Liver Transplant ()    History of Present Illness:  Patient Active Problem List   Diagnosis     Carpal tunnel syndrome     Essential hypertension     Personal history of thrombophlebitis     Esophageal reflux     Depressive disorder, not elsewhere classified     Hyperlipidemia     Sleep apnea     Testicular hypofunction     HYPERLIPIDEMIA LDL GOAL <100     Fibromyalgia     OA (osteoarthritis)     Sicca syndrome (H)     Knee pain     Primary localized osteoarthrosis, lower leg     Diabetes mellitus with neurological manifestation (H)     Medication refill- do not delete      Pain medication agreement signed - Shay Kirkpatrick 2/7/14     Hepatocellular carcinoma (H)     Uncontrolled type 2 diabetes mellitus with hyperglycemia, with long-term current use of insulin (H)     Osteoarthritis     Rheumatoid arthritis (H)     Hyperkalemia     Liver  transplant recipient (H)     Immunosuppressed status (H)     Neck pain     Back pain     Sepsis (H)     Typhlitis     Neutropenic colitis (H)     S/P liver transplant (H)     Retroperitoneal abscess (H)     Peritonitis and retroperitoneal infections (H)     Delirium, acute     Pancytopenia (H)     EJ (acute kidney injury) (H)     Hyperkalemic renal tubular acidosis     Inadequate oral intake     Gangrene of finger (H)     Ischemia of both lower extremities     SOCIAL /FAMILY HISTORY: [x]                  No recent change    Past Medical History:   Diagnosis Date     Cancer (H)      Depressive disorder, not elsewhere classified      Esophageal reflux      Fibromyalgia 1/2009    dx with Dr Benitez( Rheum)     History of thrombophlebitis      Osteoarthritis      Other acute embolism veins 11/01    Deep vein thrombophlebitis, filter placed     Other and unspecified hyperlipidemia      Other chronic nonalcoholic liver disease     Fatty liver      Other testicular hypofunction      Pneumonia, organism 10-01    Included ARDS, sepsis, and  acute renal failure; hospitalized     Rheumatoid arthritis(714.0)      Type II or unspecified type diabetes mellitus without mention of complication, not stated as uncontrolled 11/01    Managed by endocrinology     Unspecified essential hypertension 11/01    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     Unspecified sleep apnea     Uses BiPAP     Past Surgical History:   Procedure Laterality Date     BENCH LIVER N/A 3/4/2017    Procedure: BENCH LIVER;  Surgeon: Jovan Tran MD;  Location: [facility] OR     C NONSPECIFIC PROCEDURE      tracheostomy     C NONSPECIFIC PROCEDURE      repair of deviated septum     C NONSPECIFIC PROCEDURE  2007    Rt knee arthroscopy     C TOTAL KNEE ARTHROPLASTY  2008    Right knee arthroscopy     CHOLECYSTECTOMY       COLONOSCOPY N/A 7/21/2017    Procedure: COMBINED COLONOSCOPY, SINGLE OR MULTIPLE BIOPSY/POLYPECTOMY BY BIOPSY;  Colonoscopy;  Surgeon:  Izaiah Montes MD;  Location: [facility] GI     ESOPHAGOSCOPY, GASTROSCOPY, DUODENOSCOPY (EGD), COMBINED N/A 2016    Procedure: COMBINED ESOPHAGOSCOPY, GASTROSCOPY, DUODENOSCOPY (EGD), BIOPSY SINGLE OR MULTIPLE;  Surgeon: Trent Pederson MD;  Location:  GI     ESOPHAGOSCOPY, GASTROSCOPY, DUODENOSCOPY (EGD), COMBINED N/A 2017    Procedure: COMBINED ESOPHAGOSCOPY, GASTROSCOPY, DUODENOSCOPY (EGD);;  Surgeon: Los Wynn MD;  Location: [facility] GI     LAPAROTOMY EXPLORATORY N/A 2017    Procedure: LAPAROTOMY EXPLORATORY;  Exploratory Laparotomy, washout;  Surgeon: Tip Zhang MD;  Location: [facility] OR     LAPAROTOMY EXPLORATORY N/A 2017    Procedure: LAPAROTOMY EXPLORATORY;  Exploratory Laparotomy, Washout with closure.;  Surgeon: Tip Zhang MD;  Location: [facility] OR     LAPAROTOMY EXPLORATORY N/A 2017    Procedure: LAPAROTOMY EXPLORATORY;  Exploratory Laparotomy, Right angelique-colectomy, end ileostomy, mucosal fistula, partial omentectomy;  Surgeon: Sara Dinh MD;  Location: [facility] OR     TRANSPLANT LIVER RECIPIENT  DONOR N/A 3/4/2017    Procedure: TRANSPLANT LIVER RECIPIENT  DONOR;  Surgeon: Jovan Tran MD;  Location: [facility] OR     Social History     Social History     Marital status:      Spouse name: N/A     Number of children: 1     Years of education: N/A     Occupational History            Social History Main Topics     Smoking status: Former Smoker     Smokeless tobacco: Former User     Types: Chew     Quit date: 10/8/2015      Comment: Has used chewing tobacco since age 15 , chewed for 20yrs      Alcohol use No      Comment: last drink about a year ago (quit )     Drug use: No     Sexual activity: Yes     Partners: Female     Other Topics Concern     Not on file     Social History Narrative    uSED TO BE      Back in school now         Prescription Medications as of 2017              oxyCODONE (ROXICODONE) 10 MG IR tablet Take 0.5 tablets (5 mg) by mouth every 4 hours as needed for moderate to severe pain    acetaminophen (TYLENOL) 325 MG tablet Take 2 tablets (650 mg) by mouth every 4 hours as needed for mild pain    loperamide (IMODIUM) 2 MG capsule Take 2 capsules (4 mg) by mouth 2 times daily    amLODIPine (NORVASC) 5 MG tablet Take 2 tablets (10 mg) by mouth daily    fludrocortisone (FLORINEF) 0.1 MG tablet Take 1 tablet (0.1 mg) by mouth daily For elevated potassium    miconazole with skin protectant (LEXI ANTIFUNGAL) 2 % CREA cream Apply topically 2 times daily    furosemide (LASIX) 20 MG tablet Take 1 tablet (20 mg) by mouth every other day    cyclobenzaprine (FLEXERIL) 10 MG tablet Take 1 tablet (10 mg) by mouth 3 times daily as needed for muscle spasms    pantoprazole (PROTONIX) 40 MG EC tablet Take 1 tablet (40 mg) by mouth daily    sodium bicarbonate 650 MG tablet Take 1 tablet (650 mg) by mouth 3 times daily    insulin glargine (LANTUS) 100 UNIT/ML injection Inject 5 Units Subcutaneous At Bedtime    sodium chloride, PF, 0.9% PF flush 10 mLs by Intracatheter route 2 times daily    tacrolimus (GENERIC EQUIVALENT) 1 MG capsule Take 1.5mg twice daily.    tacrolimus (GENERIC EQUIVALENT) 0.5 MG capsule Take 1.5mg twice daily.    insulin aspart (NOVOLOG FLEXPEN) 100 UNIT/ML injection Inject 1-10 Units Subcutaneous 3 times daily (with meals) Pre-Meal  - 164 give 1 unit.    - 189 give 2 units.    - 214 give 3 units.    - 239 give 4 units.    - 264 give 5 units.    - 289 give 6 units.    - 314 give 7 units.    - 339 give 8 units.    - 364 give 9 units.  BG greater than or equal to 365 give 10 units    insulin aspart (NOVOLOG FLEXPEN) 100 UNIT/ML injection Inject 1-7 Units Subcutaneous At Bedtime  - 224 give 1 units.    - 249 give 2 units.    - 274 give 3 units.    - 299 give 4 units.    - 324 give 5 units.     - 349 give 6 units.   BG greater than or equal to 350 give 7 units.    insulin aspart (NOVOLOG PEN) 100 UNIT/ML injection DOSE:  1 units per 10 grams of carbohydrate. With meals and snacks. Only chart total amount of units given.  Do not give if pre-prandial glucose is less than 60 mg/dL.    tadalafil (CIALIS) 5 MG tablet Take 1 tablet (5 mg) by mouth daily Never use with nitroglycerin, terazosin or doxazosin.    prochlorperazine (COMPAZINE) 5 MG tablet Take 1-2 tablets (5-10 mg) by mouth every 6 hours as needed for nausea or vomiting    ondansetron (ZOFRAN-ODT) 4 MG ODT tab Take 1 tablet (4 mg) by mouth every 8 hours as needed for nausea        Erythromycin and Vioxx   REVIEW OF SYSTEMS (check box if normal)  [x]               GENERAL  [x]                 PULMONARY [x]                GENITOURINARY  [x]                CNS                 [x]                 CARDIAC  [x]                 ENDOCRINE  [x]                EARS,NOSE,THROAT [x]                 GASTROINTESTINAL [x]                 NEUROLOGIC    [x]                MUSCLOSKELTAL  [x]                  HEMATOLOGY      PHYSICAL EXAM (check box if normal)/86  Pulse 95  Temp 98.7  F (37.1  C) (Oral)  Resp 18  Ht 1.829 m (6')  Wt 92.4 kg (203 lb 9.6 oz)  SpO2 97%  BMI 27.61 kg/m2        [x]            GENERAL:    [x]       EYES:  ICTERIC   []        YES  []                    NO  [x]           EXTREMITIES: Clubbing []       Y     [x]           N    [x]           EARS, NOSE, THROAT: Membranes Moist    YES   [x]                   NO []                  [x]           LUNGS:  CLEAR    YES       [x]                  NO    []                                [x]           SKIN: Jaundice           YES       []                  NO    [x]                   Rash: YES       []                  NO    [x]                                     [x]             HEART: Regular Rate          YES       [x]                  NO    []                   Incision Clean:   YES       [x]                  NO    []                                [x]                    ABDOMEN: Ileostomy bag has semisolid material Organomegaly YES       []                  NO    [x]                       [x]                    NEUROLOGICAL:  Nonfocal  YES       [x]                  NO    []                       [x]                    Hernia YES       []                  NO    [x]                   PSYCHIATRIC:  Appropriate  YES       [x]                  NO    []                       OTHER:                                                                                                   PAIN SCALE:: 3    Again, thank you for allowing me to participate in the care of your patient.      Sincerely,    Jovan Tran MD

## 2017-09-11 NOTE — MR AVS SNAPSHOT
After Visit Summary   9/11/2017    Camacho Bhagat    MRN: [id number]           Patient Information     Date Of Birth          1965        Visit Information        Provider Department      9/11/2017 11:10 AM Jovan Tran MD [facility] Solid Organ Transplant        Today's Diagnoses     History of liver transplant (H)    -  1       Follow-ups after your visit        Your next 10 appointments already scheduled     Sep 14, 2017 11:00 AM CDT   LAB with  LAB   [facility])    [address] [place] [id number] [phone]           Patient must bring picture ID. Patient should be prepared to give a urine specimen  Please do not eat 10-12 hours before your appointment if you are coming in fasting for labs on lipids, cholesterol, or glucose (sugar). Pregnant women should follow their Care Team instructions. Water with medications is okay. Do not drink coffee or other fluids. If you have concerns about taking  your medications, please ask at office or if scheduling via Compute, send a message by clicking on Secure Messaging, Message Your Care Team.            Sep 18, 2017 11:00 AM CDT   LAB with UC LAB   [facility] ([facility])    [address] [place] [id number] [phone]           Patient must bring picture ID. Patient should be prepared to give a urine specimen  Please do not eat 10-12 hours before your appointment if you are coming in fasting for labs on lipids, cholesterol, or glucose (sugar). Pregnant women should follow their Care Team instructions. Water with medications is okay. Do not drink coffee or other fluids. If you have concerns about taking  your medications, please ask at office or if scheduling via Compute, send a message by clicking on Secure Messaging, Message Your Care Team.            Sep 20, 2017  2:00 PM CDT   Lab with  LAB    [facility] ([facility])    [address] [place] [id number]   [phone]            Sep 20, 2017  3:00 PM CDT   (Arrive by 2:30 PM)   New Patient Visit with Tl Higginbotham MD   [facility] ([facility])    [address] [place] [address]  3rd [facility] [id number] [phone]            Sep 21, 2017 11:15 AM CDT   LAB with MARIA LUISA LAB   [facility] ([facility])    [address] [place] [id number] [phone]           Patient must bring picture ID. Patient should be prepared to give a urine specimen  Please do not eat 10-12 hours before your appointment if you are coming in fasting for labs on lipids, cholesterol, or glucose (sugar). Pregnant women should follow their Care Team instructions. Water with medications is okay. Do not drink coffee or other fluids. If you have concerns about taking  your medications, please ask at office or if scheduling via [employer], send a message by clicking on Secure Messaging, Message Your Care Team.            Sep 22, 2017 11:00 AM CDT   (Arrive by 10:45 AM)   IR SINOGRAM INJECTION DIAGNOSTIC with MARIA LUISAXR3,  IMAGING NURSE, [facility] PA   [facility] ([facility])    [address] [place] [id number] [phone]           1. You will need to have had a history and physical exam within 7 days of the procedure. 2. Laboratory test are to be obtained by your doctor prior to the exam (CBCP, INR and PTT) 3. Someone will need to drive you to and from the hospital. 4. If you are or may be pregnant, contact your doctor or a Radiology nurse prior to the day of the exam. 5. If you have diabetes, check with your doctor or a Radiology nurse to see if your insulin needs to be adjusted for the exam. 6. If you are taking Coumadin (to thin you blood) please contact your doctor or a Radiology nurse at least 3 days  before the exam for special instructions. 7. The day before your exam you may eat your regular diet and are encouraged to drink at least 2 quarts of clear liquids. Drink no alcoholic beverages for 24 hours prior to the exam. 8. Do not eat any solid food or milk products for 6 hours prior to the exam. You may drink clear liquids until 2 hours prior to the exam. Clear liquids include the following: water, Jell-O, clear broth, apple juice or any noncarbonated drink that you can see through (no pop!) 9. The morning of the exam you may brush your teeth and take medications as directed with a sip of water. 10. Tell the Radiology nurse if you have any allergies.            Sep 25, 2017  9:00 AM CDT   Lab with  LAB   [facility] ([facility])    [address] [place] [id number] [phone]            Sep 25, 2017  9:30 AM CDT   (Arrive by 9:15 AM)   Liver Return Post Op with Jovan Tran MD   [facility] Solid Organ Transplant ([facility])    [address] [place] [id number] [phone]              Who to contact     If you have questions or need follow up information about today's clinic visit or your schedule please contact [facility]GAN TRANSPL[facility] directly at [phone].  Normal or non-critical lab and imaging results will be communicated to you by [employer], letter or phone within 4 business days after the clinic has received the results. If you do not hear from us within 7 days, please contact the clinic through [employer] or phone. If you have a critical or abnormal lab result, we will notify you by phone as soon as possible.  Submit refill requests through [employer] or call your pharmacy and they will forward the refill request to us. Please allow 3 business days for your refill to be completed.          Additional Information About Your Visit        [employer] Information     [employer] gives you secure  access to your electronic health record. If you see a primary care provider, you can also send messages to your care team and make appointments. If you have questions, please call your primary care clinic.  If you do not have a primary care provider, please call [phone] and they will assist you.        Care EveryWhere ID     This is your Care EveryWhere ID. This could be used by other organizations to access your [place] medical records  [id number]        Your Vitals Were     Pulse Temperature Respirations Height Pulse Oximetry BMI (Body Mass Index)    95 98.7  F (37.1  C) (Oral) 18 1.829 m (6') 97% 27.61 kg/m2       Blood Pressure from Last 3 Encounters:   09/11/17 155/86   09/08/17 (!) 161/92   06/12/17 (!) 175/92    Weight from Last 3 Encounters:   09/11/17 92.4 kg (203 lb 9.6 oz)   09/08/17 93.1 kg (205 lb 3.2 oz)   06/07/17 98 kg (216 lb)              Today, you had the following     No orders found for display         Today's Medication Changes          These changes are accurate as of: 9/11/17 11:22 AM.  If you have any questions, ask your nurse or doctor.               Start taking these medicines.        Dose/Directions    oxyCODONE 10 MG IR tablet   Commonly known as:  ROXICODONE   Used for:  History of liver transplant (H)   Started by:  Jovan Tran MD        Dose:  5 mg   Take 0.5 tablets (5 mg) by mouth every 4 hours as needed for moderate to severe pain   Quantity:  30 tablet   Refills:  0            Where to get your medicines      Some of these will need a paper prescription and others can be bought over the counter.  Ask your nurse if you have questions.     Bring a paper prescription for each of these medications     oxyCODONE 10 MG IR tablet                Primary Care Provider Office Phone # Fax #    Shay Kirkpatrick [phone]       [address] [place] [address] 145  [facility] [address]        Equal Access to Services     FRANKLIN PORTILLO AH: Todd Carlisle  wakpda luluceroadaha, qaybta kazeb dolan, devi de santiagoaan ah. So [facility] [phone].    ATENCIÓN: Si brandt garcia, tiene a zheng disposición servicios gratuitos de asistencia lingüística. Shahla al [phone].    We comply with applicable federal civil rights laws and [place] laws. We do not discriminate on the basis of race, color, national origin, age, disability sex, sexual orientation or gender identity.            Thank you!     Thank you for choosing [facility]  for your care. Our goal is always to provide you with excellent care. Hearing back from our patients is one way we can continue to improve our services. Please take a few minutes to complete the written survey that you may receive in the mail after your visit with us. Thank you!             Your Updated Medication List - Protect others around you: Learn how to safely use, store and throw away your medicines at www.disposemymeds.org.          This list is accurate as of: 9/11/17 11:22 AM.  Always use your most recent med list.                   Brand Name Dispense Instructions for use Diagnosis    acetaminophen 325 MG tablet    TYLENOL    100 tablet    Take 2 tablets (650 mg) by mouth every 4 hours as needed for mild pain    Osteoarthritis, unspecified osteoarthritis type, unspecified site       amLODIPine 5 MG tablet    NORVASC    60 tablet    Take 2 tablets (10 mg) by mouth daily    History of liver transplant (H), Elevated blood pressure reading, Long-term use of immunosuppressant medication       cyclobenzaprine 10 MG tablet    FLEXERIL    90 tablet    Take 1 tablet (10 mg) by mouth 3 times daily as needed for muscle spasms    Immunosuppression (H)       fludrocortisone 0.1 MG tablet    FLORINEF    30 tablet    Take 1 tablet (0.1 mg) by mouth daily For elevated potassium    History of liver transplant (H), Long-term use of immunosuppressant medication       furosemide 20 MG tablet    LASIX    30 tablet     Take 1 tablet (20 mg) by mouth every other day    Hyperkalemia       * insulin aspart 100 UNIT/ML injection    NovoLOG FLEXPEN    3 mL    Inject 1-10 Units Subcutaneous 3 times daily (with meals) Pre-Meal  - 164 give 1 unit.   - 189 give 2 units.   - 214 give 3 units.   - 239 give 4 units.   - 264 give 5 units.   - 289 give 6 units.   - 314 give 7 units.   - 339 give 8 units.   - 364 give 9 units. BG greater than or equal to 365 give 10 units    Type 2 diabetes mellitus with diabetic polyneuropathy, unspecified long term insulin use status (H)       * insulin aspart 100 UNIT/ML injection    NovoLOG FLEXPEN    3 mL    Inject 1-7 Units Subcutaneous At Bedtime  - 224 give 1 units.   - 249 give 2 units.   - 274 give 3 units.   - 299 give 4 units.   - 324 give 5 units.   - 349 give 6 units.  BG greater than or equal to 350 give 7 units.    Type 2 diabetes mellitus with diabetic polyneuropathy, unspecified long term insulin use status (H)       * insulin aspart 100 UNIT/ML injection    NovoLOG PEN    3 mL    DOSE:  1 units per 10 grams of carbohydrate. With meals and snacks. Only chart total amount of units given.  Do not give if pre-prandial glucose is less than 60 mg/dL.    Liver transplant recipient (H)       insulin glargine 100 UNIT/ML injection    LANTUS    3 mL    Inject 5 Units Subcutaneous At Bedtime    Liver transplant recipient (H)       loperamide 2 MG capsule    IMODIUM    120 capsule    Take 2 capsules (4 mg) by mouth 2 times daily    S/P right hemicolectomy       miconazole with skin protectant 2 % Crea cream     1 Tube    Apply topically 2 times daily    Liver transplant recipient (H)       ondansetron 4 MG ODT tab    ZOFRAN-ODT    30 tablet    Take 1 tablet (4 mg) by mouth every 8 hours as needed for nausea    Cirrhosis of liver with ascites, unspecified hepatic cirrhosis type (H), Nausea       oxyCODONE 10 MG IR  tablet    ROXICODONE    30 tablet    Take 0.5 tablets (5 mg) by mouth every 4 hours as needed for moderate to severe pain    History of liver transplant (H)       pantoprazole 40 MG EC tablet    PROTONIX    30 tablet    Take 1 tablet (40 mg) by mouth daily    S/P liver transplant (H)       prochlorperazine 5 MG tablet    COMPAZINE    90 tablet    Take 1-2 tablets (5-10 mg) by mouth every 6 hours as needed for nausea or vomiting    Aftercare following organ transplant, Status post liver transplantation (H)       sodium bicarbonate 650 MG tablet     90 tablet    Take 1 tablet (650 mg) by mouth 3 times daily    Liver transplant recipient (H)       sodium chloride (PF) 0.9% PF flush     1200 mL    10 mLs by Intracatheter route 2 times daily    Intra-abdominal abscess (H)       * tacrolimus 1 MG capsule    GENERIC EQUIVALENT    60 capsule    Take 1.5mg twice daily.    Liver transplant recipient (H)       * tacrolimus 0.5 MG capsule    GENERIC EQUIVALENT    60 capsule    Take 1.5mg twice daily.    Liver transplant recipient (H)       tadalafil 5 MG tablet    CIALIS    30 tablet    Take 1 tablet (5 mg) by mouth daily Never use with nitroglycerin, terazosin or doxazosin.    Drug-induced erectile dysfunction       * Notice:  This list has 5 medication(s) that are the same as other medications prescribed for you. Read the directions carefully, and ask your doctor or other care provider to review them with you.

## 2017-09-11 NOTE — PROGRESS NOTES
Patient is a transplant patient and will be followed by the transplant team for follow up        Transplant surgery: Surgery (3/4/2017)

## 2017-09-11 NOTE — NURSING NOTE
Chief Complaint   Patient presents with     Liver Transplant            Initial /86  Pulse 95  Temp 98.7  F (37.1  C) (Oral)  Resp 18  Ht 1.829 m (6')  Wt 92.4 kg (203 lb 9.6 oz)  SpO2 97%  BMI 27.61 kg/m2 Estimated body mass index is 27.61 kg/(m^2) as calculated from the following:    Height as of this encounter: 1.829 m (6').    Weight as of this encounter: 92.4 kg (203 lb 9.6 oz).

## 2017-09-12 ENCOUNTER — NURSE TRIAGE (OUTPATIENT)
Dept: NURSING | Facility: CLINIC | Age: 52
End: 2017-09-12

## 2017-09-12 ENCOUNTER — TELEPHONE (OUTPATIENT)
Dept: TRANSPLANT | Facility: CLINIC | Age: 52
End: 2017-09-12

## 2017-09-12 DIAGNOSIS — Z94.4 LIVER TRANSPLANT RECIPIENT (H): ICD-10-CM

## 2017-09-12 RX ORDER — TACROLIMUS 1 MG/1
CAPSULE ORAL
Qty: 90 CAPSULE | Refills: 11 | Status: SHIPPED | OUTPATIENT
Start: 2017-09-12 | End: 2017-09-15

## 2017-09-12 RX ORDER — TACROLIMUS 0.5 MG/1
0.5 CAPSULE ORAL DAILY
Qty: 30 CAPSULE | Refills: 11 | Status: SHIPPED | OUTPATIENT
Start: 2017-09-12 | End: 2017-09-15

## 2017-09-12 NOTE — TELEPHONE ENCOUNTER
Spoke with Abimael, pt's wife, who reports Camacho' phone is completely dead and he needs to get a new phone. Pt will increase to 1.5 mg Am and 2 mg PM on his prograf. Spoke with Camacho on abimael's phone. He repeated dose change.

## 2017-09-12 NOTE — TELEPHONE ENCOUNTER
Pt has not returned call; phone goes right to voicemail. Left message for abimael, pt's wife, to return call.

## 2017-09-13 ENCOUNTER — TELEPHONE (OUTPATIENT)
Dept: TRANSPLANT | Facility: CLINIC | Age: 52
End: 2017-09-13

## 2017-09-13 NOTE — TELEPHONE ENCOUNTER
Patient gave FNA okay to speak with his mother, Lakshmi, who states she has not yet received a call back from the MD.  FNA checked EPIC note and verified contact phone number as [phone] and that there are no blocks on the number and told caller to call back in 30 minutes if does not get a call from the AM.  FNA paged on call (Olesya) via page  Isidro at 10:05PM to call patient back directly.

## 2017-09-13 NOTE — TELEPHONE ENCOUNTER
Call from patient's mom today saying patient is having pain at his drain sites. States she talked to an on-call MD last night who suggested he be seen in the clinic tomorrow after his lab appt. Patient c/o increased pain when he is up walking, minimal pain when laying down. Mom also says he does not like to pin his drains up and the pulling is likely adding to his pain. No fever, only small area of redness around the site, unchanged. Instructed pt to take pain meds as needed, and to secure the drains some way to avoid any pulling at the site. Will attempt to schedule pt to be seen tomorrow. If unable to get it, pt is scheduled to see Dr Tran on Monday. Mom will call if there are any changes.

## 2017-09-13 NOTE — TELEPHONE ENCOUNTER
"  Reason for Disposition    [1] Caller requesting NON-URGENT health information AND [2] PCP's office is the best resource     Lakshmi(mother of patient) calling\" Camacho is a liver transplant patient. He was seen yesterday by Dr. Tran(see epic). He has two tubes and there is a \"bulge\" on one and it's red(smaller tubing, but the larger tubing and abdominal area is tender and painful. He was given pain meds yesterday, but I have not filled it yet. I am wondering if I can given him an old RX or if he needs to go in?\" Denies drainage, denies fever. Paged on call fellow Dr. Dacosta to call Lakshmi at 8:27pm at [phone] or cell ([phone]).    Additional Information    Negative: [1] Caller is not with the adult (patient) AND [2] reporting urgent symptoms    Negative: Lab result questions    Negative: Medication questions    Negative: Caller cannot be reached by phone    Negative: Caller has already spoken to PCP or another triager    Negative: RN needs further essential information from caller in order to complete triage    Negative: Requesting regular office appointment    Protocols used: INFORMATION ONLY CALL-ADULT-    "

## 2017-09-14 ENCOUNTER — TELEPHONE (OUTPATIENT)
Dept: TRANSPLANT | Facility: CLINIC | Age: 52
End: 2017-09-14

## 2017-09-14 ENCOUNTER — OFFICE VISIT (OUTPATIENT)
Dept: TRANSPLANT | Facility: CLINIC | Age: 52
End: 2017-09-14
Attending: NURSE PRACTITIONER
Payer: COMMERCIAL

## 2017-09-14 ENCOUNTER — HOSPITAL ENCOUNTER (OUTPATIENT)
Facility: CLINIC | Age: 52
End: 2017-09-14
Admitting: TRANSPLANT SURGERY

## 2017-09-14 VITALS
TEMPERATURE: 98.3 F | SYSTOLIC BLOOD PRESSURE: 150 MMHG | DIASTOLIC BLOOD PRESSURE: 82 MMHG | HEART RATE: 96 BPM | OXYGEN SATURATION: 100 % | RESPIRATION RATE: 16 BRPM

## 2017-09-14 DIAGNOSIS — D72.819 DECREASED WHITE BLOOD CELL COUNT: ICD-10-CM

## 2017-09-14 DIAGNOSIS — R80.9 PROTEINURIA: ICD-10-CM

## 2017-09-14 DIAGNOSIS — Z79.60 LONG-TERM USE OF IMMUNOSUPPRESSANT MEDICATION: ICD-10-CM

## 2017-09-14 DIAGNOSIS — Z94.4 LIVER TRANSPLANT RECIPIENT (H): Primary | ICD-10-CM

## 2017-09-14 DIAGNOSIS — Z94.4 HISTORY OF LIVER TRANSPLANT (H): ICD-10-CM

## 2017-09-14 LAB
ALBUMIN SERPL-MCNC: 2.4 G/DL (ref 3.4–5)
ALBUMIN UR-MCNC: 100 MG/DL
ALP SERPL-CCNC: 221 U/L (ref 40–150)
ALT SERPL W P-5'-P-CCNC: 26 U/L (ref 0–70)
ANION GAP SERPL CALCULATED.3IONS-SCNC: 7 MMOL/L (ref 3–14)
APPEARANCE UR: ABNORMAL
AST SERPL W P-5'-P-CCNC: 55 U/L (ref 0–45)
BASOPHILS # BLD AUTO: 0 10E9/L (ref 0–0.2)
BASOPHILS NFR BLD AUTO: 0.7 %
BILIRUB DIRECT SERPL-MCNC: 0.2 MG/DL (ref 0–0.2)
BILIRUB SERPL-MCNC: 0.8 MG/DL (ref 0.2–1.3)
BILIRUB UR QL STRIP: NEGATIVE
BUN SERPL-MCNC: 28 MG/DL (ref 7–30)
CALCIUM SERPL-MCNC: 8.4 MG/DL (ref 8.5–10.1)
CHLORIDE SERPL-SCNC: 105 MMOL/L (ref 94–109)
CO2 SERPL-SCNC: 22 MMOL/L (ref 20–32)
COLOR UR AUTO: YELLOW
CREAT SERPL-MCNC: 1.27 MG/DL (ref 0.66–1.25)
CREAT UR-MCNC: 86 MG/DL
DEPRECATED CALCIDIOL+CALCIFEROL SERPL-MC: 20 UG/L (ref 20–75)
DIFFERENTIAL METHOD BLD: NORMAL
EOSINOPHIL # BLD AUTO: 0.1 10E9/L (ref 0–0.7)
EOSINOPHIL NFR BLD AUTO: 2.4 %
ERYTHROCYTE [DISTWIDTH] IN BLOOD BY AUTOMATED COUNT: 17.9 % (ref 10–15)
FERRITIN SERPL-MCNC: 2947 NG/ML (ref 26–388)
GFR SERPL CREATININE-BSD FRML MDRD: 59 ML/MIN/1.7M2
GLUCOSE SERPL-MCNC: 209 MG/DL (ref 70–99)
GLUCOSE UR STRIP-MCNC: 50 MG/DL
HCT VFR BLD AUTO: 25.6 % (ref 40–53)
HGB BLD-MCNC: 8.5 G/DL (ref 13.3–17.7)
HGB UR QL STRIP: NEGATIVE
IMM GRANULOCYTES # BLD: 0.2 10E9/L (ref 0–0.4)
IMM GRANULOCYTES NFR BLD: 5 %
IRON SATN MFR SERPL: 22 % (ref 15–46)
IRON SERPL-MCNC: 39 UG/DL (ref 35–180)
KETONES UR STRIP-MCNC: NEGATIVE MG/DL
LEUKOCYTE ESTERASE UR QL STRIP: NEGATIVE
LYMPHOCYTES # BLD AUTO: 1.4 10E9/L (ref 0.8–5.3)
LYMPHOCYTES NFR BLD AUTO: 32.1 %
MAGNESIUM SERPL-MCNC: 1.6 MG/DL (ref 1.6–2.3)
MCH RBC QN AUTO: 30.7 PG (ref 26.5–33)
MCHC RBC AUTO-ENTMCNC: 33.2 G/DL (ref 31.5–36.5)
MCV RBC AUTO: 92 FL (ref 78–100)
MONOCYTES # BLD AUTO: 0.4 10E9/L (ref 0–1.3)
MONOCYTES NFR BLD AUTO: 9.5 %
NEUTROPHILS # BLD AUTO: 2.1 10E9/L (ref 1.6–8.3)
NEUTROPHILS NFR BLD AUTO: 50.3 %
NITRATE UR QL: NEGATIVE
NRBC # BLD AUTO: 0 10*3/UL
NRBC BLD AUTO-RTO: 0 /100
PH UR STRIP: 5 PH (ref 5–7)
PHOSPHATE SERPL-MCNC: 3.4 MG/DL (ref 2.5–4.5)
PLATELET # BLD AUTO: 103 10E9/L (ref 150–450)
POTASSIUM SERPL-SCNC: 5 MMOL/L (ref 3.4–5.3)
PROT SERPL-MCNC: 8 G/DL (ref 6.8–8.8)
PROT UR-MCNC: 1.18 G/L
PROT/CREAT 24H UR: 1.37 G/G CR (ref 0–0.2)
PTH-INTACT SERPL-MCNC: 34 PG/ML (ref 12–72)
RBC # BLD AUTO: 2.77 10E12/L (ref 4.4–5.9)
RBC #/AREA URNS AUTO: 3 /HPF (ref 0–2)
SODIUM SERPL-SCNC: 135 MMOL/L (ref 133–144)
SOURCE: ABNORMAL
SP GR UR STRIP: 1.01 (ref 1–1.03)
TACROLIMUS BLD-MCNC: <3 UG/L (ref 5–15)
TIBC SERPL-MCNC: 182 UG/DL (ref 240–430)
TME LAST DOSE: ABNORMAL H
UROBILINOGEN UR STRIP-MCNC: 0 MG/DL (ref 0–2)
WBC # BLD AUTO: 4.2 10E9/L (ref 4–11)
WBC #/AREA URNS AUTO: 2 /HPF (ref 0–2)

## 2017-09-14 PROCEDURE — 84156 ASSAY OF PROTEIN URINE: CPT | Performed by: INTERNAL MEDICINE

## 2017-09-14 PROCEDURE — 82248 BILIRUBIN DIRECT: CPT | Performed by: INTERNAL MEDICINE

## 2017-09-14 PROCEDURE — 82728 ASSAY OF FERRITIN: CPT | Performed by: INTERNAL MEDICINE

## 2017-09-14 PROCEDURE — 99211 OFF/OP EST MAY X REQ PHY/QHP: CPT

## 2017-09-14 PROCEDURE — 84155 ASSAY OF PROTEIN SERUM: CPT | Performed by: INTERNAL MEDICINE

## 2017-09-14 PROCEDURE — 85027 COMPLETE CBC AUTOMATED: CPT | Performed by: INTERNAL MEDICINE

## 2017-09-14 PROCEDURE — 83550 IRON BINDING TEST: CPT | Performed by: INTERNAL MEDICINE

## 2017-09-14 PROCEDURE — 80197 ASSAY OF TACROLIMUS: CPT | Performed by: INTERNAL MEDICINE

## 2017-09-14 PROCEDURE — 80069 RENAL FUNCTION PANEL: CPT | Performed by: INTERNAL MEDICINE

## 2017-09-14 PROCEDURE — 81001 URINALYSIS AUTO W/SCOPE: CPT | Performed by: INTERNAL MEDICINE

## 2017-09-14 PROCEDURE — 84075 ASSAY ALKALINE PHOSPHATASE: CPT | Performed by: INTERNAL MEDICINE

## 2017-09-14 PROCEDURE — 36415 COLL VENOUS BLD VENIPUNCTURE: CPT | Performed by: INTERNAL MEDICINE

## 2017-09-14 PROCEDURE — 83735 ASSAY OF MAGNESIUM: CPT | Performed by: INTERNAL MEDICINE

## 2017-09-14 PROCEDURE — 82247 BILIRUBIN TOTAL: CPT | Performed by: INTERNAL MEDICINE

## 2017-09-14 PROCEDURE — 83970 ASSAY OF PARATHORMONE: CPT | Performed by: INTERNAL MEDICINE

## 2017-09-14 PROCEDURE — 84450 TRANSFERASE (AST) (SGOT): CPT | Performed by: INTERNAL MEDICINE

## 2017-09-14 PROCEDURE — 82306 VITAMIN D 25 HYDROXY: CPT | Performed by: INTERNAL MEDICINE

## 2017-09-14 PROCEDURE — 84460 ALANINE AMINO (ALT) (SGPT): CPT | Performed by: INTERNAL MEDICINE

## 2017-09-14 PROCEDURE — 85004 AUTOMATED DIFF WBC COUNT: CPT | Performed by: INTERNAL MEDICINE

## 2017-09-14 PROCEDURE — 83540 ASSAY OF IRON: CPT | Performed by: INTERNAL MEDICINE

## 2017-09-14 ASSESSMENT — PAIN SCALES - GENERAL: PAINLEVEL: MILD PAIN (2)

## 2017-09-14 NOTE — TELEPHONE ENCOUNTER
Spoke with Humaira, pt's mother, that patient will come [place] on [place] 5th [place] [facility] [place] check in 9:45 and NPO for 6 hours prior to procedure at 11:15am.   Humaira repeated plan and will call back with any further questions.

## 2017-09-14 NOTE — PROGRESS NOTES
Transplant Surgery -OUTPATIENT IMMUNOSUPPRESSION PROGRESS NOTE    Date of Visit: 09/14/2017    Transplants:  3/4/2017 (Liver); Postoperative day:  194  ASSESMENT AND PLAN:  1.Graft Function: functioning well  2.Immunosuppression Management: no change labs pending  3.Hypertension: well controlled  4.Renal Function: stable  5.Lab frequency: twice weekly   6.Other: 3 drains in place.  IR will reposition drains.      Date: September 14, 2017    Transplant:  [x]                             Liver [x]                              Kidney []                             Pancreas []                              Other:             Chief Complaint:Liver Transplant ()    History of Present Illness: s/p liver txp on 3/4/17.  Patient had drains placed in IR.  He states the other day one of his drains caught on the bed post and pulled.  He states he would like all of his drains removed today as he does not like them being in his abdomen.          Patient Active Problem List   Diagnosis     Carpal tunnel syndrome     Essential hypertension     Personal history of thrombophlebitis     Esophageal reflux     Depressive disorder, not elsewhere classified     Hyperlipidemia     Sleep apnea     Testicular hypofunction     HYPERLIPIDEMIA LDL GOAL <100     Fibromyalgia     OA (osteoarthritis)     Sicca syndrome (H)     Knee pain     Primary localized osteoarthrosis, lower leg     Diabetes mellitus with neurological manifestation (H)     Medication refill- do not delete      Pain medication agreement signed - Shay Kirkpatrick 2/7/14     Hepatocellular carcinoma (H)     Uncontrolled type 2 diabetes mellitus with hyperglycemia, with long-term current use of insulin (H)     Osteoarthritis     Rheumatoid arthritis (H)     Hyperkalemia     Liver transplant recipient (H)     Immunosuppressed status (H)     Neck pain     Back pain     Sepsis (H)     Typhlitis     Neutropenic colitis (H)     S/P liver transplant (H)     Retroperitoneal abscess (H)      Peritonitis and retroperitoneal infections (H)     Delirium, acute     Pancytopenia (H)     EJ (acute kidney injury) (H)     Hyperkalemic renal tubular acidosis     Inadequate oral intake     Gangrene of finger (H)     Ischemia of both lower extremities     SOCIAL /FAMILY HISTORY: [x]                  No recent change    Past Medical History:   Diagnosis Date     Cancer (H)      Depressive disorder, not elsewhere classified      Esophageal reflux      Fibromyalgia 1/2009    dx with Dr Benitez( Rheum)     History of thrombophlebitis      Osteoarthritis      Other acute embolism veins 11/01    Deep vein thrombophlebitis, filter placed     Other and unspecified hyperlipidemia      Other chronic nonalcoholic liver disease     Fatty liver      Other testicular hypofunction      Pneumonia, organism 10-01    Included ARDS, sepsis, and  acute renal failure; hospitalized     Rheumatoid arthritis(714.0)      Type II or unspecified type diabetes mellitus without mention of complication, not stated as uncontrolled 11/01    Managed by endocrinology     Unspecified essential hypertension 11/01    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     Unspecified sleep apnea     Uses BiPAP     Past Surgical History:   Procedure Laterality Date     BENCH LIVER N/A 3/4/2017    Procedure: BENCH LIVER;  Surgeon: Jovan Tran MD;  Location:  OR      NONSPECIFIC PROCEDURE      tracheostomy     C NONSPECIFIC PROCEDURE      repair of deviated septum     C NONSPECIFIC PROCEDURE  2007    Rt knee arthroscopy     C TOTAL KNEE ARTHROPLASTY  2008    Right knee arthroscopy     CHOLECYSTECTOMY       COLONOSCOPY N/A 7/21/2017    Procedure: COMBINED COLONOSCOPY, SINGLE OR MULTIPLE BIOPSY/POLYPECTOMY BY BIOPSY;  Colonoscopy;  Surgeon: Izaiah Montes MD;  [facility]     ESOPHAGOSCOPY, GASTROSCOPY, DUODENOSCOPY (EGD), COMBINED N/A 8/4/2016    Procedure: COMBINED ESOPHAGOSCOPY, GASTROSCOPY, DUODENOSCOPY (EGD), BIOPSY SINGLE OR  MULTIPLE;  Surgeon: Trent Pederson MD;  Location: [facility]     ESOPHAGOSCOPY, GASTROSCOPY, DUODENOSCOPY (EGD), COMBINED N/A 2017    Procedure: COMBINED ESOPHAGOSCOPY, GASTROSCOPY, DUODENOSCOPY (EGD);;  Surgeon: Los Wynn MD;  [facility]     LAPAROTOMY EXPLORATORY N/A 2017    Procedure: LAPAROTOMY EXPLORATORY;  Exploratory Laparotomy, washout;  Surgeon: Tip Zhang MD;  [facility]     LAPAROTOMY EXPLORATORY N/A 2017    Procedure: LAPAROTOMY EXPLORATORY;  Exploratory Laparotomy, Washout with closure.;  Surgeon: Tip Zhang MD;  [facility]     LAPAROTOMY EXPLORATORY N/A 2017    Procedure: LAPAROTOMY EXPLORATORY;  Exploratory Laparotomy, Right angelique-colectomy, end ileostomy, mucosal fistula, partial omentectomy;  Surgeon: Sara Dinh MD;  [facility]     TRANSPLANT LIVER RECIPIENT  DONOR N/A 3/4/2017    Procedure: TRANSPLANT LIVER RECIPIENT  DONOR;  Surgeon: Jovan Tran MD;  [facility]     Social History     Social History     Marital status:      Spouse name: N/A     Number of children: 1     Years of education: N/A     Occupational History            Social History Main Topics     Smoking status: Former Smoker     Smokeless tobacco: Former User     Types: Chew     Quit date: 10/8/2015      Comment: Has used chewing tobacco since age 15 , chewed for 20yrs      Alcohol use No      Comment: last drink about a year ago (quit )     Drug use: No     Sexual activity: Yes     Partners: Female     Other Topics Concern     Not on file     Social History Narrative    uSED TO BE      Back in school now         Prescription Medications as of 2017             tacrolimus (GENERIC EQUIVALENT) 1 MG capsule Take 1.5mg AM and 2 mg PM    tacrolimus (GENERIC EQUIVALENT) 0.5 MG capsule Take 1 capsule (0.5 mg) by mouth daily Take 1.5mgAM and 2 mg AM    oxyCODONE  (ROXICODONE) 10 MG IR tablet Take 0.5 tablets (5 mg) by mouth every 4 hours as needed for moderate to severe pain    acetaminophen (TYLENOL) 325 MG tablet Take 2 tablets (650 mg) by mouth every 4 hours as needed for mild pain    loperamide (IMODIUM) 2 MG capsule Take 2 capsules (4 mg) by mouth 2 times daily    amLODIPine (NORVASC) 5 MG tablet Take 2 tablets (10 mg) by mouth daily    fludrocortisone (FLORINEF) 0.1 MG tablet Take 1 tablet (0.1 mg) by mouth daily For elevated potassium    miconazole with skin protectant (LEXI ANTIFUNGAL) 2 % CREA cream Apply topically 2 times daily    furosemide (LASIX) 20 MG tablet Take 1 tablet (20 mg) by mouth every other day    cyclobenzaprine (FLEXERIL) 10 MG tablet Take 1 tablet (10 mg) by mouth 3 times daily as needed for muscle spasms    pantoprazole (PROTONIX) 40 MG EC tablet Take 1 tablet (40 mg) by mouth daily    sodium bicarbonate 650 MG tablet Take 1 tablet (650 mg) by mouth 3 times daily    insulin glargine (LANTUS) 100 UNIT/ML injection Inject 5 Units Subcutaneous At Bedtime    sodium chloride, PF, 0.9% PF flush 10 mLs by Intracatheter route 2 times daily    insulin aspart (NOVOLOG FLEXPEN) 100 UNIT/ML injection Inject 1-10 Units Subcutaneous 3 times daily (with meals) Pre-Meal  - 164 give 1 unit.    - 189 give 2 units.    - 214 give 3 units.    - 239 give 4 units.    - 264 give 5 units.    - 289 give 6 units.    - 314 give 7 units.    - 339 give 8 units.    - 364 give 9 units.  BG greater than or equal to 365 give 10 units    insulin aspart (NOVOLOG FLEXPEN) 100 UNIT/ML injection Inject 1-7 Units Subcutaneous At Bedtime  - 224 give 1 units.    - 249 give 2 units.    - 274 give 3 units.    - 299 give 4 units.    - 324 give 5 units.    - 349 give 6 units.   BG greater than or equal to 350 give 7 units.    insulin aspart (NOVOLOG PEN) 100 UNIT/ML injection DOSE:  1 units per 10 grams  of carbohydrate. With meals and snacks. Only chart total amount of units given.  Do not give if pre-prandial glucose is less than 60 mg/dL.    tadalafil (CIALIS) 5 MG tablet Take 1 tablet (5 mg) by mouth daily Never use with nitroglycerin, terazosin or doxazosin.    prochlorperazine (COMPAZINE) 5 MG tablet Take 1-2 tablets (5-10 mg) by mouth every 6 hours as needed for nausea or vomiting    ondansetron (ZOFRAN-ODT) 4 MG ODT tab Take 1 tablet (4 mg) by mouth every 8 hours as needed for nausea        Erythromycin and Vioxx   REVIEW OF SYSTEMS (check box if normal)  [x]               GENERAL  [x]                 PULMONARY [x]                GENITOURINARY  [x]                CNS                 [x]                 CARDIAC  [x]                 ENDOCRINE  [x]                EARS,NOSE,THROAT [x]                 GASTROINTESTINAL [x]                 NEUROLOGIC    [x]                MUSCLOSKELTAL  [x]                  HEMATOLOGY      PHYSICAL EXAM (check box if normal)/82  Pulse 96  Temp 98.3  F (36.8  C) (Oral)  Resp 16  SpO2 100%        [x]            GENERAL:    [x]       EYES:  ICTERIC   []        YES  []                    NO  [x]           EXTREMITIES: Clubbing []       Y     [x]           N    [x]           EARS, NOSE, THROAT: Membranes Moist    YES   [x]                   NO []                  [x]           LUNGS:  CLEAR    YES       [x]                  NO    []                                [x]           SKIN: Jaundice           YES       []                  NO    [x]                   Rash: YES       []                  NO    [x]                                     [x]             HEART: Regular Rate          YES       [x]                  NO    []                   Incision Clean:  YES       [x]                  NO    []        3 drains, C/D/I.                          [x]                    ABDOMEN: Organomegaly YES       []                  NO    [x]                       [x]                     NEUROLOGICAL:  Nonfocal  YES       [x]                  NO    []                       [x]                    Hernia YES       []                  NO    [x]                   PSYCHIATRIC:  Appropriate  YES       [x]                  NO    []                       OTHER:                                                                                                   PAIN SCALE:: 3

## 2017-09-14 NOTE — LETTER
9/14/2017       RE: Camacho Bhagat  [address] [facility] [id number]     Dear Colleague,    Thank you for referring your patient, Camacho Bhagat, to the [facility] TRANSPL[facility] at [facility]. Please see a copy of my visit note below.    Transplant Surgery -OUTPATIENT IMMUNOSUPPRESSION PROGRESS NOTE    Date of Visit: 09/14/2017    Transplants:  3/4/2017 (Liver); Postoperative day:  194  ASSESMENT AND PLAN:  1.Graft Function: functioning well  2.Immunosuppression Management: no change labs pending  3.Hypertension: well controlled  4.Renal Function: stable  5.Lab frequency: twice weekly   6.Other: 3 drains in place.  IR will reposition drains.      Date: September 14, 2017    Transplant:  [x]                             Liver [x]                              Kidney []                             Pancreas []                              Other:             Chief Complaint:Liver Transplant ()    History of Present Illness: s/p liver txp on 3/4/17.  Patient had drains placed in IR.  He states the other day one of his drains caught on the bed post and pulled.  He states he would like all of his drains removed today as he does not like them being in his abdomen.          Patient Active Problem List   Diagnosis     Carpal tunnel syndrome     Essential hypertension     Personal history of thrombophlebitis     Esophageal reflux     Depressive disorder, not elsewhere classified     Hyperlipidemia     Sleep apnea     Testicular hypofunction     HYPERLIPIDEMIA LDL GOAL <100     Fibromyalgia     OA (osteoarthritis)     Sicca syndrome (H)     Knee pain     Primary localized osteoarthrosis, lower leg     Diabetes mellitus with neurological manifestation (H)     Medication refill- do not delete      Pain medication agreement signed - Shay Kirkpatrick 2/7/14     Hepatocellular carcinoma (H)     Uncontrolled type 2 diabetes mellitus with hyperglycemia, with long-term current use of  insulin (H)     Osteoarthritis     Rheumatoid arthritis (H)     Hyperkalemia     Liver transplant recipient (H)     Immunosuppressed status (H)     Neck pain     Back pain     Sepsis (H)     Typhlitis     Neutropenic colitis (H)     S/P liver transplant (H)     Retroperitoneal abscess (H)     Peritonitis and retroperitoneal infections (H)     Delirium, acute     Pancytopenia (H)     EJ (acute kidney injury) (H)     Hyperkalemic renal tubular acidosis     Inadequate oral intake     Gangrene of finger (H)     Ischemia of both lower extremities     SOCIAL /FAMILY HISTORY: [x]                  No recent change    Past Medical History:   Diagnosis Date     Cancer (H)      Depressive disorder, not elsewhere classified      Esophageal reflux      Fibromyalgia 1/2009    dx with Dr Benitez( Rheum)     History of thrombophlebitis      Osteoarthritis      Other acute embolism veins 11/01    Deep vein thrombophlebitis, filter placed     Other and unspecified hyperlipidemia      Other chronic nonalcoholic liver disease     Fatty liver      Other testicular hypofunction      Pneumonia, organism 10-01    Included ARDS, sepsis, and  acute renal failure; hospitalized     Rheumatoid arthritis(714.0)      Type II or unspecified type diabetes mellitus without mention of complication, not stated as uncontrolled 11/01    Managed by endocrinology     Unspecified essential hypertension 11/01    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     Unspecified sleep apnea     Uses BiPAP     Past Surgical History:   Procedure Laterality Date     BENCH LIVER N/A 3/4/2017    Procedure: BENCH LIVER;  Surgeon: Jovan Tran MD;  [facility]     C NONSPECIFIC PROCEDURE      tracheostomy     C NONSPECIFIC PROCEDURE      repair of deviated septum     C NONSPECIFIC PROCEDURE  2007    Rt knee arthroscopy     C TOTAL KNEE ARTHROPLASTY  2008    Right knee arthroscopy     CHOLECYSTECTOMY       COLONOSCOPY N/A 7/21/2017    Procedure: COMBINED  COLONOSCOPY, SINGLE OR MULTIPLE BIOPSY/POLYPECTOMY BY BIOPSY;  Colonoscopy;  Surgeon: Izaiah Montes MD;  [facility]     ESOPHAGOSCOPY, GASTROSCOPY, DUODENOSCOPY (EGD), COMBINED N/A 2016    Procedure: COMBINED ESOPHAGOSCOPY, GASTROSCOPY, DUODENOSCOPY (EGD), BIOPSY SINGLE OR MULTIPLE;  Surgeon: Trent Pederson MD;  Location:  GI     ESOPHAGOSCOPY, GASTROSCOPY, DUODENOSCOPY (EGD), COMBINED N/A 2017    Procedure: COMBINED ESOPHAGOSCOPY, GASTROSCOPY, DUODENOSCOPY (EGD);;  Surgeon: Lso Wynn MD;  [facility]     LAPAROTOMY EXPLORATORY N/A 2017    Procedure: LAPAROTOMY EXPLORATORY;  Exploratory Laparotomy, washout;  Surgeon: Tip Zhang MD;  [facility]     LAPAROTOMY EXPLORATORY N/A 2017    Procedure: LAPAROTOMY EXPLORATORY;  Exploratory Laparotomy, Washout with closure.;  Surgeon: Tip Zhang MD;  [facility]     LAPAROTOMY EXPLORATORY N/A 2017    Procedure: LAPAROTOMY EXPLORATORY;  Exploratory Laparotomy, Right angelique-colectomy, end ileostomy, mucosal fistula, partial omentectomy;  Surgeon: Sara Dinh MD;  [facility]     TRANSPLANT LIVER RECIPIENT  DONOR N/A 3/4/2017    Procedure: TRANSPLANT LIVER RECIPIENT  DONOR;  Surgeon: Jovan Tran MD;  [facility]     Social History     Social History     Marital status:      Spouse name: N/A     Number of children: 1     Years of education: N/A     Occupational History            Social History Main Topics     Smoking status: Former Smoker     Smokeless tobacco: Former User     Types: Chew     Quit date: 10/8/2015      Comment: Has used chewing tobacco since age 15 , chewed for 20yrs      Alcohol use No      Comment: last drink about a year ago (quit )     Drug use: No     Sexual activity: Yes     Partners: Female     Other Topics Concern     Not on file     Social History Narrative    uSED TO [employer]       Back in school now         Prescription Medications as of 9/14/2017             tacrolimus (GENERIC EQUIVALENT) 1 MG capsule Take 1.5mg AM and 2 mg PM    tacrolimus (GENERIC EQUIVALENT) 0.5 MG capsule Take 1 capsule (0.5 mg) by mouth daily Take 1.5mgAM and 2 mg AM    oxyCODONE (ROXICODONE) 10 MG IR tablet Take 0.5 tablets (5 mg) by mouth every 4 hours as needed for moderate to severe pain    acetaminophen (TYLENOL) 325 MG tablet Take 2 tablets (650 mg) by mouth every 4 hours as needed for mild pain    loperamide (IMODIUM) 2 MG capsule Take 2 capsules (4 mg) by mouth 2 times daily    amLODIPine (NORVASC) 5 MG tablet Take 2 tablets (10 mg) by mouth daily    fludrocortisone (FLORINEF) 0.1 MG tablet Take 1 tablet (0.1 mg) by mouth daily For elevated potassium    miconazole with skin protectant (LEXI ANTIFUNGAL) 2 % CREA cream Apply topically 2 times daily    furosemide (LASIX) 20 MG tablet Take 1 tablet (20 mg) by mouth every other day    cyclobenzaprine (FLEXERIL) 10 MG tablet Take 1 tablet (10 mg) by mouth 3 times daily as needed for muscle spasms    pantoprazole (PROTONIX) 40 MG EC tablet Take 1 tablet (40 mg) by mouth daily    sodium bicarbonate 650 MG tablet Take 1 tablet (650 mg) by mouth 3 times daily    insulin glargine (LANTUS) 100 UNIT/ML injection Inject 5 Units Subcutaneous At Bedtime    sodium chloride, PF, 0.9% PF flush 10 mLs by Intracatheter route 2 times daily    insulin aspart (NOVOLOG FLEXPEN) 100 UNIT/ML injection Inject 1-10 Units Subcutaneous 3 times daily (with meals) Pre-Meal  - 164 give 1 unit.    - 189 give 2 units.    - 214 give 3 units.    - 239 give 4 units.    - 264 give 5 units.    - 289 give 6 units.    - 314 give 7 units.    - 339 give 8 units.    - 364 give 9 units.  BG greater than or equal to 365 give 10 units    insulin aspart (NOVOLOG FLEXPEN) 100 UNIT/ML injection Inject 1-7 Units Subcutaneous At Bedtime  - 224 give  1 units.    - 249 give 2 units.    - 274 give 3 units.    - 299 give 4 units.    - 324 give 5 units.    - 349 give 6 units.   BG greater than or equal to 350 give 7 units.    insulin aspart (NOVOLOG PEN) 100 UNIT/ML injection DOSE:  1 units per 10 grams of carbohydrate. With meals and snacks. Only chart total amount of units given.  Do not give if pre-prandial glucose is less than 60 mg/dL.    tadalafil (CIALIS) 5 MG tablet Take 1 tablet (5 mg) by mouth daily Never use with nitroglycerin, terazosin or doxazosin.    prochlorperazine (COMPAZINE) 5 MG tablet Take 1-2 tablets (5-10 mg) by mouth every 6 hours as needed for nausea or vomiting    ondansetron (ZOFRAN-ODT) 4 MG ODT tab Take 1 tablet (4 mg) by mouth every 8 hours as needed for nausea        Erythromycin and Vioxx   REVIEW OF SYSTEMS (check box if normal)  [x]               GENERAL  [x]                 PULMONARY [x]                GENITOURINARY  [x]                CNS                 [x]                 CARDIAC  [x]                 ENDOCRINE  [x]                EARS,NOSE,THROAT [x]                 GASTROINTESTINAL [x]                 NEUROLOGIC    [x]                MUSCLOSKELTAL  [x]                  HEMATOLOGY      PHYSICAL EXAM (check box if normal)/82  Pulse 96  Temp 98.3  F (36.8  C) (Oral)  Resp 16  SpO2 100%        [x]            GENERAL:    [x]       EYES:  ICTERIC   []        YES  []                    NO  [x]           EXTREMITIES: Clubbing []       Y     [x]           N    [x]           EARS, NOSE, THROAT: Membranes Moist    YES   [x]                   NO []                  [x]           LUNGS:  CLEAR    YES       [x]                  NO    []                                [x]           SKIN: Jaundice           YES       []                  NO    [x]                   Rash: YES       []                  NO    [x]                                     [x]             HEART: Regular Rate          YES       [x]                   NO    []                   Incision Clean:  YES       [x]                  NO    []        3 drains, C/D/I.                          [x]                    ABDOMEN: Organomegaly YES       []                  NO    [x]                       [x]                    NEUROLOGICAL:  Nonfocal  YES       [x]                  NO    []                       [x]                    Hernia YES       []                  NO    [x]                   PSYCHIATRIC:  Appropriate  YES       [x]                  NO    []                       OTHER:                                                                                                   PAIN SCALE:: 3    Again, thank you for allowing me to participate in the care of your patient.      Sincerely,    Dora Elizabeth NP

## 2017-09-14 NOTE — NURSING NOTE
Chief Complaint   Patient presents with     Liver Transplant            Initial /82  Pulse 96  Temp 98.3  F (36.8  C) (Oral)  Resp 16  SpO2 100% Estimated body mass index is 27.61 kg/(m^2) as calculated from the following:    Height as of 9/11/17: 1.829 m (6').    Weight as of 9/11/17: 92.4 kg (203 lb 9.6 oz).

## 2017-09-14 NOTE — MR AVS SNAPSHOT
After Visit Summary   9/14/2017    Camacho Bhagat    MRN: [id number]           Patient Information     Date Of Birth          1965        Visit Information        Provider Department      9/14/2017 10:00 AM Dora Elizabeth NP [facility] Solid Organ Transplant        Today's Diagnoses     Liver transplant recipient (H)    -  1       Follow-ups after your visit        Your next 10 appointments already scheduled     Sep 18, 2017 11:00 AM CDT   LAB with  LAB   [facility] Lab ([facility])    [address] [address] [phone]           Patient must bring picture ID. Patient should be prepared to give a urine specimen  Please do not eat 10-12 hours before your appointment if you are coming in fasting for labs on lipids, cholesterol, or glucose (sugar). Pregnant women should follow their Care Team instructions. Water with medications is okay. Do not drink coffee or other fluids. If you have concerns about taking  your medications, please ask at office or if scheduling via Jumiot, send a message by clicking on Secure Messaging, Message Your Care Team.            Sep 18, 2017 11:30 AM CDT   (Arrive by 11:15 AM)   Liver Return Post Op with Jovan Tran MD   [facility] Solid Organ Transplant ([facility])    [address] [id number] [phone]            Sep 20, 2017  2:00 PM CDT   Lab with  LAB   [facility] Lab ([facility])    [address] [place] [id number] [phone]            Sep 20, 2017  3:00 PM CDT   (Arrive by 2:30 PM)   New Patient Visit with Tl Higginbotham MD   [facility] Nephrology ([facility])    [address] [id number] [phone]            Sep 21, 2017 11:15 AM CDT   LAB with  LAB   [facility] Lab ([facility])    [address] [id number] [phone]           Patient must bring picture ID. Patient should be prepared to give a urine specimen  Please do not eat 10-12 hours before your appointment if you are coming in fasting for labs on lipids, cholesterol, or glucose (sugar). Pregnant women should follow their Care Team instructions. Water with medications is okay. Do not drink coffee or other fluids. If you have concerns about taking  your medications, please ask at office or if scheduling via baseclick, send a message by clicking on Secure Messaging, Message Your Care Team.            Sep 22, 2017 11:00 AM CDT   (Arrive by 10:45 AM)   IR SINOGRAM INJECTION DIAGNOSTIC with UCXR3, UC IMAGING NURSE, UC IMAGING [facility] Xray ([facility])    [address] [id number] [phone]           1. You will need to have had a history and physical exam within 7 days of the procedure. 2. Laboratory test are to be obtained by your doctor prior to the exam (CBCP, INR and PTT) 3. Someone will need to drive you to and from the hospital. 4. If you are or may be pregnant, contact your doctor or a Radiology nurse prior to the day of the exam. 5. If you have diabetes, check with your doctor or a Radiology nurse to see if your insulin needs to be adjusted for the exam. 6. If you are taking Coumadin (to thin you blood) please contact your doctor or a Radiology nurse at least 3 days before the exam for special instructions. 7. The day before your exam you may eat your regular diet and are encouraged to drink at least 2 quarts of clear liquids. Drink no alcoholic beverages for 24 hours prior to the exam. 8. Do not eat any solid food or milk products for 6 hours prior to the exam. You may drink clear liquids until 2 hours prior to the exam. Clear liquids include the following: water, Jell-O, clear broth, apple juice or any noncarbonated  drink that you can see through (no pop!) 9. The morning of the exam you may brush your teeth and take medications as directed with a sip of water. 10. Tell the Radiology nurse if you have any allergies.            Sep 25, 2017  9:00 AM CDT   Lab with  LAB   [facility] Lab ([facility])    [address] [facility]  [address] [facility] [phone] [phone]            Sep 25, 2017  9:30 AM CDT   (Arrive by 9:15 AM)   Liver Return Post Op with Jovan Tran MD   [facility] Solid Organ Transplant ([facility])    [address] [facility]  [address] [facility] [address] [phone]              Future tests that were ordered for you today     Open Future Orders        Priority Expected Expires Ordered    IR Sinogram Injection Diagnostic Routine  9/14/2018 9/14/2017            Who to contact     If you have questions or need follow up information about today's clinic visit or your schedule please contact [facility] SOLID ORGAN TRANSPLANT directly at [phone].  Normal or non-critical lab and imaging results will be communicated to you by [place] Ordershart, letter or phone within 4 business days after the clinic has received the results. If you do not hear from us within 7 days, please contact the clinic through [employer] or phone. If you have a critical or abnormal lab result, we will notify you by phone as soon as possible.  Submit refill requests through [employer] or call your pharmacy and they will forward the refill request to us. Please allow 3 business days for your refill to be completed.          Additional Information About Your Visit        [employer] Information     [employer] gives you secure access to your electronic health record. If you see a primary care provider, you can also send messages to your care team and make appointments. If you have questions, please call your primary care clinic.  If you do not have a primary care provider, please call [phone]  and they will assist you.        Care EveryWhere ID     This is your Care EveryWhere ID. This could be used by other organizations to access your [place] medical records  [id number]        Your Vitals Were     Pulse Temperature Respirations Pulse Oximetry          96 98.3  F (36.8  C) (Oral) 16 100%         Blood Pressure from Last 3 Encounters:   09/14/17 150/82   09/11/17 155/86   09/08/17 (!) 161/92    Weight from Last 3 Encounters:   09/11/17 92.4 kg (203 lb 9.6 oz)   09/08/17 93.1 kg (205 lb 3.2 oz)   06/07/17 98 kg (216 lb)               Primary Care Provider Office Phone # Fax #    Shay Kirkpatrick [phone]       [address] [place] [address] [address] [place] [address]        Equal Access to Services     JOSE [facility] HospitalEMILY : Hadii tavo ku hadasho Soomaali, waaxda luqadaha, qaybta kaalmada adeegyada, devi smith hayjarrod craig . So [facility] [phone].    ATENCIÓN: Si habla español, tiene a zheng disposición servicios gratuitos de asistencia lingüística. Llame al [phone].    We comply with applicable federal civil rights laws and [place] laws. We do not discriminate on the basis of race, color, national origin, age, disability sex, sexual orientation or gender identity.            Thank you!     Thank you for choosing [facility] SOLID ORGAN TRANSPLANT  for your care. Our goal is always to provide you with excellent care. Hearing back from our patients is one way we can continue to improve our services. Please take a few minutes to complete the written survey that you may receive in the mail after your visit with us. Thank you!             Your Updated Medication List - Protect others around you: Learn how to safely use, store and throw away your medicines at www.disposemymeds.org.          This list is accurate as of: 9/14/17 12:52 PM.  Always use your most recent med list.                   Brand Name Dispense Instructions for use Diagnosis    acetaminophen 325 MG tablet    TYLENOL    100  tablet    Take 2 tablets (650 mg) by mouth every 4 hours as needed for mild pain    Osteoarthritis, unspecified osteoarthritis type, unspecified site       amLODIPine 5 MG tablet    NORVASC    60 tablet    Take 2 tablets (10 mg) by mouth daily    History of liver transplant (H), Elevated blood pressure reading, Long-term use of immunosuppressant medication       cyclobenzaprine 10 MG tablet    FLEXERIL    90 tablet    Take 1 tablet (10 mg) by mouth 3 times daily as needed for muscle spasms    Immunosuppression (H)       fludrocortisone 0.1 MG tablet    FLORINEF    30 tablet    Take 1 tablet (0.1 mg) by mouth daily For elevated potassium    History of liver transplant (H), Long-term use of immunosuppressant medication       furosemide 20 MG tablet    LASIX    30 tablet    Take 1 tablet (20 mg) by mouth every other day    Hyperkalemia       * insulin aspart 100 UNIT/ML injection    NovoLOG FLEXPEN    3 mL    Inject 1-10 Units Subcutaneous 3 times daily (with meals) Pre-Meal  - 164 give 1 unit.   - 189 give 2 units.   - 214 give 3 units.   - 239 give 4 units.   - 264 give 5 units.   - 289 give 6 units.   - 314 give 7 units.   - 339 give 8 units.   - 364 give 9 units. BG greater than or equal to 365 give 10 units    Type 2 diabetes mellitus with diabetic polyneuropathy, unspecified long term insulin use status (H)       * insulin aspart 100 UNIT/ML injection    NovoLOG FLEXPEN    3 mL    Inject 1-7 Units Subcutaneous At Bedtime  - 224 give 1 units.   - 249 give 2 units.   - 274 give 3 units.   - 299 give 4 units.   - 324 give 5 units.   - 349 give 6 units.  BG greater than or equal to 350 give 7 units.    Type 2 diabetes mellitus with diabetic polyneuropathy, unspecified long term insulin use status (H)       * insulin aspart 100 UNIT/ML injection    NovoLOG PEN    3 mL    DOSE:  1 units per 10 grams of carbohydrate. With meals and  snacks. Only chart total amount of units given.  Do not give if pre-prandial glucose is less than 60 mg/dL.    Liver transplant recipient (H)       insulin glargine 100 UNIT/ML injection    LANTUS    3 mL    Inject 5 Units Subcutaneous At Bedtime    Liver transplant recipient (H)       loperamide 2 MG capsule    IMODIUM    120 capsule    Take 2 capsules (4 mg) by mouth 2 times daily    S/P right hemicolectomy       miconazole with skin protectant 2 % Crea cream     1 Tube    Apply topically 2 times daily    Liver transplant recipient (H)       ondansetron 4 MG ODT tab    ZOFRAN-ODT    30 tablet    Take 1 tablet (4 mg) by mouth every 8 hours as needed for nausea    Cirrhosis of liver with ascites, unspecified hepatic cirrhosis type (H), Nausea       oxyCODONE 10 MG IR tablet    ROXICODONE    30 tablet    Take 0.5 tablets (5 mg) by mouth every 4 hours as needed for moderate to severe pain    History of liver transplant (H)       pantoprazole 40 MG EC tablet    PROTONIX    30 tablet    Take 1 tablet (40 mg) by mouth daily    S/P liver transplant (H)       prochlorperazine 5 MG tablet    COMPAZINE    90 tablet    Take 1-2 tablets (5-10 mg) by mouth every 6 hours as needed for nausea or vomiting    Aftercare following organ transplant, Status post liver transplantation (H)       sodium bicarbonate 650 MG tablet     90 tablet    Take 1 tablet (650 mg) by mouth 3 times daily    Liver transplant recipient (H)       sodium chloride (PF) 0.9% PF flush     1200 mL    10 mLs by Intracatheter route 2 times daily    Intra-abdominal abscess (H)       * tacrolimus 1 MG capsule    GENERIC EQUIVALENT    90 capsule    Take 1.5mg AM and 2 mg PM    Liver transplant recipient (H)       * tacrolimus 0.5 MG capsule    GENERIC EQUIVALENT    30 capsule    Take 1 capsule (0.5 mg) by mouth daily Take 1.5mgAM and 2 mg AM    Liver transplant recipient (H)       tadalafil 5 MG tablet    CIALIS    30 tablet    Take 1 tablet (5 mg) by mouth daily  Never use with nitroglycerin, terazosin or doxazosin.    Drug-induced erectile dysfunction       * Notice:  This list has 5 medication(s) that are the same as other medications prescribed for you. Read the directions carefully, and ask your doctor or other care provider to review them with you.

## 2017-09-15 ENCOUNTER — TELEPHONE (OUTPATIENT)
Dept: INTERVENTIONAL RADIOLOGY/VASCULAR | Facility: CLINIC | Age: 52
End: 2017-09-15

## 2017-09-15 ENCOUNTER — TELEPHONE (OUTPATIENT)
Dept: TRANSPLANT | Facility: CLINIC | Age: 52
End: 2017-09-15

## 2017-09-15 DIAGNOSIS — Z94.4 LIVER TRANSPLANT RECIPIENT (H): ICD-10-CM

## 2017-09-15 RX ORDER — TACROLIMUS 1 MG/1
2 CAPSULE ORAL 2 TIMES DAILY
Qty: 120 CAPSULE | Refills: 11 | Status: SHIPPED | OUTPATIENT
Start: 2017-09-15 | End: 2017-09-20

## 2017-09-15 RX ORDER — TACROLIMUS 0.5 MG/1
0.5 CAPSULE ORAL 2 TIMES DAILY
Qty: 60 CAPSULE | Refills: 11 | Status: SHIPPED | OUTPATIENT
Start: 2017-09-15 | End: 2017-09-20 | Stop reason: DRUGHIGH

## 2017-09-15 NOTE — TELEPHONE ENCOUNTER
"Pt's prograf level < 3. Pt increased to 2.5 mg BID.  Pt and his mother repeated dose change.     Pt's mother called to say Left and right drain are putting out 50 cc each in 12 hours. And the \"Big drain\" is putting out 75 cc for 12 hours. They will continue to monitor output.       "

## 2017-09-18 ENCOUNTER — OFFICE VISIT (OUTPATIENT)
Dept: TRANSPLANT | Facility: CLINIC | Age: 52
End: 2017-09-18
Attending: TRANSPLANT SURGERY
Payer: COMMERCIAL

## 2017-09-18 ENCOUNTER — TELEPHONE (OUTPATIENT)
Dept: INTERVENTIONAL RADIOLOGY/VASCULAR | Facility: CLINIC | Age: 52
End: 2017-09-18

## 2017-09-18 VITALS
DIASTOLIC BLOOD PRESSURE: 80 MMHG | RESPIRATION RATE: 16 BRPM | WEIGHT: 206.35 LBS | HEIGHT: 72 IN | TEMPERATURE: 98.2 F | OXYGEN SATURATION: 95 % | SYSTOLIC BLOOD PRESSURE: 138 MMHG | BODY MASS INDEX: 27.95 KG/M2 | HEART RATE: 83 BPM

## 2017-09-18 DIAGNOSIS — Z94.4 LIVER TRANSPLANT RECIPIENT (H): Primary | ICD-10-CM

## 2017-09-18 DIAGNOSIS — D72.819 DECREASED WHITE BLOOD CELL COUNT: ICD-10-CM

## 2017-09-18 DIAGNOSIS — Z79.60 LONG-TERM USE OF IMMUNOSUPPRESSANT MEDICATION: ICD-10-CM

## 2017-09-18 DIAGNOSIS — Z94.4 HISTORY OF LIVER TRANSPLANT (H): ICD-10-CM

## 2017-09-18 LAB
ALBUMIN SERPL-MCNC: 2.4 G/DL (ref 3.4–5)
ALP SERPL-CCNC: 225 U/L (ref 40–150)
ALT SERPL W P-5'-P-CCNC: 22 U/L (ref 0–70)
ANION GAP SERPL CALCULATED.3IONS-SCNC: 8 MMOL/L (ref 3–14)
ANISOCYTOSIS BLD QL SMEAR: SLIGHT
AST SERPL W P-5'-P-CCNC: 40 U/L (ref 0–45)
BASOPHILS # BLD AUTO: 0.1 10E9/L (ref 0–0.2)
BASOPHILS NFR BLD AUTO: 1.7 %
BILIRUB DIRECT SERPL-MCNC: 0.2 MG/DL (ref 0–0.2)
BILIRUB SERPL-MCNC: 0.6 MG/DL (ref 0.2–1.3)
BUN SERPL-MCNC: 22 MG/DL (ref 7–30)
CALCIUM SERPL-MCNC: 8.1 MG/DL (ref 8.5–10.1)
CHLORIDE SERPL-SCNC: 104 MMOL/L (ref 94–109)
CO2 SERPL-SCNC: 24 MMOL/L (ref 20–32)
CREAT SERPL-MCNC: 1.3 MG/DL (ref 0.66–1.25)
DIFFERENTIAL METHOD BLD: NORMAL
EOSINOPHIL # BLD AUTO: 0.1 10E9/L (ref 0–0.7)
EOSINOPHIL NFR BLD AUTO: 2.6 %
ERYTHROCYTE [DISTWIDTH] IN BLOOD BY AUTOMATED COUNT: 18 % (ref 10–15)
GFR SERPL CREATININE-BSD FRML MDRD: 58 ML/MIN/1.7M2
GLUCOSE SERPL-MCNC: 213 MG/DL (ref 70–99)
HCT VFR BLD AUTO: 23.8 % (ref 40–53)
HGB BLD-MCNC: 8 G/DL (ref 13.3–17.7)
LYMPHOCYTES # BLD AUTO: 1.3 10E9/L (ref 0.8–5.3)
LYMPHOCYTES NFR BLD AUTO: 28.7 %
MAGNESIUM SERPL-MCNC: 1.5 MG/DL (ref 1.6–2.3)
MCH RBC QN AUTO: 30.5 PG (ref 26.5–33)
MCHC RBC AUTO-ENTMCNC: 33.6 G/DL (ref 31.5–36.5)
MCV RBC AUTO: 91 FL (ref 78–100)
METAMYELOCYTES # BLD: 0 10E9/L
METAMYELOCYTES NFR BLD MANUAL: 0.9 %
MONOCYTES # BLD AUTO: 0.3 10E9/L (ref 0–1.3)
MONOCYTES NFR BLD AUTO: 6.1 %
MYELOCYTES # BLD: 0 10E9/L
MYELOCYTES NFR BLD MANUAL: 0.9 %
NEUTROPHILS # BLD AUTO: 2.8 10E9/L (ref 1.6–8.3)
NEUTROPHILS NFR BLD AUTO: 59.1 %
PHOSPHATE SERPL-MCNC: 3.7 MG/DL (ref 2.5–4.5)
PLATELET # BLD AUTO: 93 10E9/L (ref 150–450)
POTASSIUM SERPL-SCNC: 4.6 MMOL/L (ref 3.4–5.3)
PROT SERPL-MCNC: 7.7 G/DL (ref 6.8–8.8)
RBC # BLD AUTO: 2.62 10E12/L (ref 4.4–5.9)
SODIUM SERPL-SCNC: 136 MMOL/L (ref 133–144)
TACROLIMUS BLD-MCNC: <3 UG/L (ref 5–15)
TME LAST DOSE: ABNORMAL H
WBC # BLD AUTO: 4.7 10E9/L (ref 4–11)

## 2017-09-18 PROCEDURE — 85027 COMPLETE CBC AUTOMATED: CPT | Performed by: INTERNAL MEDICINE

## 2017-09-18 PROCEDURE — 85004 AUTOMATED DIFF WBC COUNT: CPT | Performed by: INTERNAL MEDICINE

## 2017-09-18 PROCEDURE — 83735 ASSAY OF MAGNESIUM: CPT | Performed by: INTERNAL MEDICINE

## 2017-09-18 PROCEDURE — 80076 HEPATIC FUNCTION PANEL: CPT | Performed by: INTERNAL MEDICINE

## 2017-09-18 PROCEDURE — 80197 ASSAY OF TACROLIMUS: CPT | Performed by: INTERNAL MEDICINE

## 2017-09-18 PROCEDURE — 80048 BASIC METABOLIC PNL TOTAL CA: CPT | Performed by: INTERNAL MEDICINE

## 2017-09-18 PROCEDURE — 36415 COLL VENOUS BLD VENIPUNCTURE: CPT | Performed by: INTERNAL MEDICINE

## 2017-09-18 PROCEDURE — 84100 ASSAY OF PHOSPHORUS: CPT | Performed by: INTERNAL MEDICINE

## 2017-09-18 PROCEDURE — 99211 OFF/OP EST MAY X REQ PHY/QHP: CPT

## 2017-09-18 ASSESSMENT — PAIN SCALES - GENERAL: PAINLEVEL: EXTREME PAIN (8)

## 2017-09-18 NOTE — LETTER
9/18/2017       RE: Camacho Bhagat  [address] [facility] [place] [address]     Dear Colleague,    Thank you for referring your patient, Camacho Bhagat, to the [facility] SOLID ORGAN TRANSPLANT at [facility]. Please see a copy of my visit note below.        Transplant Surgery -OUTPATIENT IMMUNOSUPPRESSION PROGRESS NOTE    Date of Visit: 09/18/2017    Transplants:  3/4/2017 (Liver); Postoperative day:  198  ASSESMENT AND PLAN:  1.Graft Function: Liver allograft: no rejection or technical problems.    2.Immunosuppression Management: keep tacrolimus levels at 8-10  3.Hypertension: ok  4.Renal Function: ok  5.Lab frequency: weekly  6.Other:  Intra abdominal infections: recommend CT AND IR to check the drains and remove them    Date: September 18, 2017    Transplant:  [x]                             Liver [x]                              Kidney []                             Pancreas []                              Other:             Chief Complaint:Liver Transplant ()  Feels tired today, no fever  History of Present Illness:  Post colon resection with abdominal abscess    Patient Active Problem List   Diagnosis     Carpal tunnel syndrome     Essential hypertension     Personal history of thrombophlebitis     Esophageal reflux     Depressive disorder, not elsewhere classified     Hyperlipidemia     Sleep apnea     Testicular hypofunction     HYPERLIPIDEMIA LDL GOAL <100     Fibromyalgia     OA (osteoarthritis)     Sicca syndrome (H)     Knee pain     Primary localized osteoarthrosis, lower leg     Diabetes mellitus with neurological manifestation (H)     Medication refill- do not delete      Pain medication agreement signed - Shay Kirkpatrick 2/7/14     Hepatocellular carcinoma (H)     Uncontrolled type 2 diabetes mellitus with hyperglycemia, with long-term current use of insulin (H)     Osteoarthritis     Rheumatoid arthritis (H)     Hyperkalemia     Liver transplant recipient (H)      Immunosuppressed status (H)     Neck pain     Back pain     Sepsis (H)     Typhlitis     Neutropenic colitis (H)     S/P liver transplant (H)     Retroperitoneal abscess (H)     Peritonitis and retroperitoneal infections (H)     Delirium, acute     Pancytopenia (H)     EJ (acute kidney injury) (H)     Hyperkalemic renal tubular acidosis     Inadequate oral intake     Gangrene of finger (H)     Ischemia of both lower extremities     SOCIAL /FAMILY HISTORY: [x]                  No recent change    Past Medical History:   Diagnosis Date     Cancer (H)      Depressive disorder, not elsewhere classified      Esophageal reflux      Fibromyalgia 1/2009    dx with Dr Benitez( Rheum)     History of thrombophlebitis      Osteoarthritis      Other acute embolism veins 11/01    Deep vein thrombophlebitis, filter placed     Other and unspecified hyperlipidemia      Other chronic nonalcoholic liver disease     Fatty liver      Other testicular hypofunction      Pneumonia, organism 10-01    Included ARDS, sepsis, and  acute renal failure; hospitalized     Rheumatoid arthritis(714.0)      Type II or unspecified type diabetes mellitus without mention of complication, not stated as uncontrolled 11/01    Managed by endocrinology     Unspecified essential hypertension 11/01    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     Unspecified sleep apnea     Uses BiPAP     Past Surgical History:   Procedure Laterality Date     BENCH LIVER N/A 3/4/2017    Procedure: BENCH LIVER;  Surgeon: Jovan Tran MD;  Location: [facility] OR     C NONSPECIFIC PROCEDURE      tracheostomy     C NONSPECIFIC PROCEDURE      repair of deviated septum     C NONSPECIFIC PROCEDURE  2007    Rt knee arthroscopy     C TOTAL KNEE ARTHROPLASTY  2008    Right knee arthroscopy     CHOLECYSTECTOMY       COLONOSCOPY N/A 7/21/2017    Procedure: COMBINED COLONOSCOPY, SINGLE OR MULTIPLE BIOPSY/POLYPECTOMY BY BIOPSY;  Colonoscopy;  Surgeon: Izaiah Montes MD;   Location: [facility] GI     ESOPHAGOSCOPY, GASTROSCOPY, DUODENOSCOPY (EGD), COMBINED N/A 2016    Procedure: COMBINED ESOPHAGOSCOPY, GASTROSCOPY, DUODENOSCOPY (EGD), BIOPSY SINGLE OR MULTIPLE;  Surgeon: Trent Pederson MD;  Location:  GI     ESOPHAGOSCOPY, GASTROSCOPY, DUODENOSCOPY (EGD), COMBINED N/A 2017    Procedure: COMBINED ESOPHAGOSCOPY, GASTROSCOPY, DUODENOSCOPY (EGD);;  Surgeon: Los Wynn MD;  Location: [facility] GI     LAPAROTOMY EXPLORATORY N/A 2017    Procedure: LAPAROTOMY EXPLORATORY;  Exploratory Laparotomy, washout;  Surgeon: Tip Zhang MD;  Location: [facility] OR     LAPAROTOMY EXPLORATORY N/A 2017    Procedure: LAPAROTOMY EXPLORATORY;  Exploratory Laparotomy, Washout with closure.;  Surgeon: Tip Zhang MD;  Location: [facility] OR     LAPAROTOMY EXPLORATORY N/A 2017    Procedure: LAPAROTOMY EXPLORATORY;  Exploratory Laparotomy, Right angelique-colectomy, end ileostomy, mucosal fistula, partial omentectomy;  Surgeon: Sara Dinh MD;  Location: [facility] OR     TRANSPLANT LIVER RECIPIENT  DONOR N/A 3/4/2017    Procedure: TRANSPLANT LIVER RECIPIENT  DONOR;  Surgeon: Jovan Tran MD;  Location: [facility] OR     Social History     Social History     Marital status:      Spouse name: N/A     Number of children: 1     Years of education: N/A     Occupational History            Social History Main Topics     Smoking status: Former Smoker     Smokeless tobacco: Former User     Types: Chew     Quit date: 10/8/2015      Comment: Has used chewing tobacco since age 15 , chewed for 20yrs      Alcohol use No      Comment: last drink about a year ago (quit )     Drug use: No     Sexual activity: Yes     Partners: Female     Other Topics Concern     Not on file     Social History Narrative    uSED TO BE      Back in school now         Prescription Medications as of 2017             tacrolimus (GENERIC  EQUIVALENT) 1 MG capsule Take 2 capsules (2 mg) by mouth 2 times daily *total dose 2.5 mg BID    tacrolimus (GENERIC EQUIVALENT) 0.5 MG capsule Take 1 capsule (0.5 mg) by mouth 2 times daily *total dose 2.5 mg BID    oxyCODONE (ROXICODONE) 10 MG IR tablet Take 0.5 tablets (5 mg) by mouth every 4 hours as needed for moderate to severe pain    acetaminophen (TYLENOL) 325 MG tablet Take 2 tablets (650 mg) by mouth every 4 hours as needed for mild pain    loperamide (IMODIUM) 2 MG capsule Take 2 capsules (4 mg) by mouth 2 times daily    amLODIPine (NORVASC) 5 MG tablet Take 2 tablets (10 mg) by mouth daily    fludrocortisone (FLORINEF) 0.1 MG tablet Take 1 tablet (0.1 mg) by mouth daily For elevated potassium    miconazole with skin protectant (LEXI ANTIFUNGAL) 2 % CREA cream Apply topically 2 times daily    furosemide (LASIX) 20 MG tablet Take 1 tablet (20 mg) by mouth every other day    cyclobenzaprine (FLEXERIL) 10 MG tablet Take 1 tablet (10 mg) by mouth 3 times daily as needed for muscle spasms    pantoprazole (PROTONIX) 40 MG EC tablet Take 1 tablet (40 mg) by mouth daily    sodium bicarbonate 650 MG tablet Take 1 tablet (650 mg) by mouth 3 times daily    insulin glargine (LANTUS) 100 UNIT/ML injection Inject 5 Units Subcutaneous At Bedtime    sodium chloride, PF, 0.9% PF flush 10 mLs by Intracatheter route 2 times daily    insulin aspart (NOVOLOG FLEXPEN) 100 UNIT/ML injection Inject 1-10 Units Subcutaneous 3 times daily (with meals) Pre-Meal  - 164 give 1 unit.    - 189 give 2 units.    - 214 give 3 units.    - 239 give 4 units.    - 264 give 5 units.    - 289 give 6 units.    - 314 give 7 units.    - 339 give 8 units.    - 364 give 9 units.  BG greater than or equal to 365 give 10 units    insulin aspart (NOVOLOG FLEXPEN) 100 UNIT/ML injection Inject 1-7 Units Subcutaneous At Bedtime  - 224 give 1 units.    - 249 give 2 units.    - 274 give  3 units.    - 299 give 4 units.    - 324 give 5 units.    - 349 give 6 units.   BG greater than or equal to 350 give 7 units.    insulin aspart (NOVOLOG PEN) 100 UNIT/ML injection DOSE:  1 units per 10 grams of carbohydrate. With meals and snacks. Only chart total amount of units given.  Do not give if pre-prandial glucose is less than 60 mg/dL.    tadalafil (CIALIS) 5 MG tablet Take 1 tablet (5 mg) by mouth daily Never use with nitroglycerin, terazosin or doxazosin.    prochlorperazine (COMPAZINE) 5 MG tablet Take 1-2 tablets (5-10 mg) by mouth every 6 hours as needed for nausea or vomiting    ondansetron (ZOFRAN-ODT) 4 MG ODT tab Take 1 tablet (4 mg) by mouth every 8 hours as needed for nausea        Erythromycin and Vioxx   REVIEW OF SYSTEMS (check box if normal)  [x]               GENERAL  [x]                 PULMONARY [x]                GENITOURINARY  [x]                CNS                 [x]                 CARDIAC  [x]                 ENDOCRINE  [x]                EARS,NOSE,THROAT [x]                 GASTROINTESTINAL [x]                 NEUROLOGIC    [x]                MUSCLOSKELTAL  [x]                  HEMATOLOGY      PHYSICAL EXAM (check box if normal)/80  Pulse 83  Temp 98.2  F (36.8  C) (Oral)  Resp 16  Ht 1.829 m (6')  Wt 93.6 kg (206 lb 5.6 oz)  SpO2 95%  BMI 27.99 kg/m2      [x]            GENERAL:    [x]       EYES:  ICTERIC   []        YES  []                    NO  [x]           EXTREMITIES: Clubbing []       Y     [x]           N    [x]           EARS, NOSE, THROAT: Membranes Moist    YES   [x]                   NO []                  [x]           LUNGS:  CLEAR    YES       [x]                  NO    []                                [x]           SKIN: Jaundice           YES       []                  NO    [x]                   Rash: YES       []                  NO    [x]                                     [x]             HEART: Regular Rate          YES        [x]                  NO    []                   Incision Clean:  YES       [x]                  NO    []                                [x]                    ABDOMEN: Organomegaly YES       []                  NO    [x]                       [x]                    NEUROLOGICAL:  Nonfocal  YES       [x]                  NO    []                       [x]                    Hernia YES       []                  NO    [x]                   PSYCHIATRIC:  Appropriate  YES       [x]                  NO    []                       OTHER:                                                                                                   PAIN SCALE:: 3    Again, thank you for allowing me to participate in the care of your patient.      Sincerely,    Jovan Tran MD

## 2017-09-18 NOTE — NURSING NOTE
Here for post liver transplant follow-up.    Reviewed recent labs and assisted with interpretation.     Complaints:  Feeling okay,  Eating okay, avoiding high potassium foods.  Has 3 abdominal drains, scheduled to see IR this week for evaluation of drains.   Per , likely he will have drains removed slowly, one at at time over a few weeks.  Plan for pt to have CT scan per     Current immunosuppression:    Tacrolimus 2.5mg Q 12 hours    Med changes: none.       Lab frequency:   2x/weekly since discharged from hospital.     Follow-up:  RTC to see  in 3 weeks.    Post transplant appt with  in Nov-Dec.

## 2017-09-18 NOTE — PROGRESS NOTES
HPI      ROS      Physical Exam    Transplant Surgery -OUTPATIENT IMMUNOSUPPRESSION PROGRESS NOTE    Date of Visit: 09/18/2017    Transplants:  3/4/2017 (Liver); Postoperative day:  198  ASSESMENT AND PLAN:  1.Graft Function: Liver allograft: no rejection or technical problems.    2.Immunosuppression Management: keep tacrolimus levels at 8-10  3.Hypertension: ok  4.Renal Function: ok  5.Lab frequency: weekly  6.Other:  Intra abdominal infections: recommend CT AND IR to check the drains and remove them    Date: September 18, 2017    Transplant:  [x]                             Liver [x]                              Kidney []                             Pancreas []                              Other:             Chief Complaint:Liver Transplant ()  Feels tired today, no fever  History of Present Illness:  Post colon resection with abdominal abscess    Patient Active Problem List   Diagnosis     Carpal tunnel syndrome     Essential hypertension     Personal history of thrombophlebitis     Esophageal reflux     Depressive disorder, not elsewhere classified     Hyperlipidemia     Sleep apnea     Testicular hypofunction     HYPERLIPIDEMIA LDL GOAL <100     Fibromyalgia     OA (osteoarthritis)     Sicca syndrome (H)     Knee pain     Primary localized osteoarthrosis, lower leg     Diabetes mellitus with neurological manifestation (H)     Medication refill- do not delete      Pain medication agreement signed - Shay Kirkpatrick 2/7/14     Hepatocellular carcinoma (H)     Uncontrolled type 2 diabetes mellitus with hyperglycemia, with long-term current use of insulin (H)     Osteoarthritis     Rheumatoid arthritis (H)     Hyperkalemia     Liver transplant recipient (H)     Immunosuppressed status (H)     Neck pain     Back pain     Sepsis (H)     Typhlitis     Neutropenic colitis (H)     S/P liver transplant (H)     Retroperitoneal abscess (H)     Peritonitis and retroperitoneal infections (H)     Delirium, acute      Pancytopenia (H)     EJ (acute kidney injury) (H)     Hyperkalemic renal tubular acidosis     Inadequate oral intake     Gangrene of finger (H)     Ischemia of both lower extremities     SOCIAL /FAMILY HISTORY: [x]                  No recent change    Past Medical History:   Diagnosis Date     Cancer (H)      Depressive disorder, not elsewhere classified      Esophageal reflux      Fibromyalgia 1/2009    dx with Dr Benitez( [facility])     History of thrombophlebitis      Osteoarthritis      Other acute embolism veins 11/01    Deep vein thrombophlebitis, filter placed     Other and unspecified hyperlipidemia      Other chronic nonalcoholic liver disease     Fatty liver      Other testicular hypofunction      Pneumonia, organism 10-01    Included ARDS, sepsis, and  acute renal failure; hospitalized     Rheumatoid arthritis(714.0)      Type II or unspecified type diabetes mellitus without mention of complication, not stated as uncontrolled 11/01    Managed by endocrinology     Unspecified essential hypertension 11/01    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     Unspecified sleep apnea     Uses BiPAP     Past Surgical History:   Procedure Laterality Date     BENCH LIVER N/A 3/4/2017    Procedure: BENCH LIVER;  Surgeon: Jovan Tran MD;  Location:  OR      NONSPECIFIC PROCEDURE      tracheostomy     C NONSPECIFIC PROCEDURE      repair of deviated septum     C NONSPECIFIC PROCEDURE  2007    Rt knee arthroscopy     C TOTAL KNEE ARTHROPLASTY  2008    Right knee arthroscopy     CHOLECYSTECTOMY       COLONOSCOPY N/A 7/21/2017    Procedure: COMBINED COLONOSCOPY, SINGLE OR MULTIPLE BIOPSY/POLYPECTOMY BY BIOPSY;  Colonoscopy;  Surgeon: Izaiah Montes MD;  Location:  GI     ESOPHAGOSCOPY, GASTROSCOPY, DUODENOSCOPY (EGD), COMBINED N/A 8/4/2016    Procedure: COMBINED ESOPHAGOSCOPY, GASTROSCOPY, DUODENOSCOPY (EGD), BIOPSY SINGLE OR MULTIPLE;  Surgeon: Trent Pederson MD;  Location:  GI     ESOPHAGOSCOPY,  GASTROSCOPY, DUODENOSCOPY (EGD), COMBINED N/A 2017    Procedure: COMBINED ESOPHAGOSCOPY, GASTROSCOPY, DUODENOSCOPY (EGD);;  Surgeon: Los Wynn MD;  Location: [facility]     LAPAROTOMY EXPLORATORY N/A 2017    Procedure: LAPAROTOMY EXPLORATORY;  Exploratory Laparotomy, washout;  Surgeon: Tip Zhang MD;  Location: [facility] OR     LAPAROTOMY EXPLORATORY N/A 2017    Procedure: LAPAROTOMY EXPLORATORY;  Exploratory Laparotomy, Washout with closure.;  Surgeon: Tip Zhang MD;  Location: [facility] OR     LAPAROTOMY EXPLORATORY N/A 2017    Procedure: LAPAROTOMY EXPLORATORY;  Exploratory Laparotomy, Right angelique-colectomy, end ileostomy, mucosal fistula, partial omentectomy;  Surgeon: Sara Dinh MD;  Location: [facility] OR     TRANSPLANT LIVER RECIPIENT  DONOR N/A 3/4/2017    Procedure: TRANSPLANT LIVER RECIPIENT  DONOR;  Surgeon: Jovan Tran MD;  Location: [facility] OR     Social History     Social History     Marital status:      Spouse name: N/A     Number of children: 1     Years of education: N/A     Occupational History            Social History Main Topics     Smoking status: Former Smoker     Smokeless tobacco: Former User     Types: Chew     Quit date: 10/8/2015      Comment: Has used chewing tobacco since age 15 , chewed for 20yrs      Alcohol use No      Comment: last drink about a year ago (quit )     Drug use: No     Sexual activity: Yes     Partners: Female     Other Topics Concern     Not on file     Social History Narrative    uSED TO BE      Back in school now         Prescription Medications as of 2017             tacrolimus (GENERIC EQUIVALENT) 1 MG capsule Take 2 capsules (2 mg) by mouth 2 times daily *total dose 2.5 mg BID    tacrolimus (GENERIC EQUIVALENT) 0.5 MG capsule Take 1 capsule (0.5 mg) by mouth 2 times daily *total dose 2.5 mg BID    oxyCODONE (ROXICODONE) 10 MG IR tablet Take 0.5  tablets (5 mg) by mouth every 4 hours as needed for moderate to severe pain    acetaminophen (TYLENOL) 325 MG tablet Take 2 tablets (650 mg) by mouth every 4 hours as needed for mild pain    loperamide (IMODIUM) 2 MG capsule Take 2 capsules (4 mg) by mouth 2 times daily    amLODIPine (NORVASC) 5 MG tablet Take 2 tablets (10 mg) by mouth daily    fludrocortisone (FLORINEF) 0.1 MG tablet Take 1 tablet (0.1 mg) by mouth daily For elevated potassium    miconazole with skin protectant (LEXI ANTIFUNGAL) 2 % CREA cream Apply topically 2 times daily    furosemide (LASIX) 20 MG tablet Take 1 tablet (20 mg) by mouth every other day    cyclobenzaprine (FLEXERIL) 10 MG tablet Take 1 tablet (10 mg) by mouth 3 times daily as needed for muscle spasms    pantoprazole (PROTONIX) 40 MG EC tablet Take 1 tablet (40 mg) by mouth daily    sodium bicarbonate 650 MG tablet Take 1 tablet (650 mg) by mouth 3 times daily    insulin glargine (LANTUS) 100 UNIT/ML injection Inject 5 Units Subcutaneous At Bedtime    sodium chloride, PF, 0.9% PF flush 10 mLs by Intracatheter route 2 times daily    insulin aspart (NOVOLOG FLEXPEN) 100 UNIT/ML injection Inject 1-10 Units Subcutaneous 3 times daily (with meals) Pre-Meal  - 164 give 1 unit.    - 189 give 2 units.    - 214 give 3 units.    - 239 give 4 units.    - 264 give 5 units.    - 289 give 6 units.    - 314 give 7 units.    - 339 give 8 units.    - 364 give 9 units.  BG greater than or equal to 365 give 10 units    insulin aspart (NOVOLOG FLEXPEN) 100 UNIT/ML injection Inject 1-7 Units Subcutaneous At Bedtime  - 224 give 1 units.    - 249 give 2 units.    - 274 give 3 units.    - 299 give 4 units.    - 324 give 5 units.    - 349 give 6 units.   BG greater than or equal to 350 give 7 units.    insulin aspart (NOVOLOG PEN) 100 UNIT/ML injection DOSE:  1 units per 10 grams of carbohydrate. With meals and  snacks. Only chart total amount of units given.  Do not give if pre-prandial glucose is less than 60 mg/dL.    tadalafil (CIALIS) 5 MG tablet Take 1 tablet (5 mg) by mouth daily Never use with nitroglycerin, terazosin or doxazosin.    prochlorperazine (COMPAZINE) 5 MG tablet Take 1-2 tablets (5-10 mg) by mouth every 6 hours as needed for nausea or vomiting    ondansetron (ZOFRAN-ODT) 4 MG ODT tab Take 1 tablet (4 mg) by mouth every 8 hours as needed for nausea        Erythromycin and Vioxx   REVIEW OF SYSTEMS (check box if normal)  [x]               GENERAL  [x]                 PULMONARY [x]                GENITOURINARY  [x]                CNS                 [x]                 CARDIAC  [x]                 ENDOCRINE  [x]                EARS,NOSE,THROAT [x]                 GASTROINTESTINAL [x]                 NEUROLOGIC    [x]                MUSCLOSKELTAL  [x]                  HEMATOLOGY      PHYSICAL EXAM (check box if normal)/80  Pulse 83  Temp 98.2  F (36.8  C) (Oral)  Resp 16  Ht 1.829 m (6')  Wt 93.6 kg (206 lb 5.6 oz)  SpO2 95%  BMI 27.99 kg/m2      [x]            GENERAL:    [x]       EYES:  ICTERIC   []        YES  []                    NO  [x]           EXTREMITIES: Clubbing []       Y     [x]           N    [x]           EARS, NOSE, THROAT: Membranes Moist    YES   [x]                   NO []                  [x]           LUNGS:  CLEAR    YES       [x]                  NO    []                                [x]           SKIN: Jaundice           YES       []                  NO    [x]                   Rash: YES       []                  NO    [x]                                     [x]             HEART: Regular Rate          YES       [x]                  NO    []                   Incision Clean:  YES       [x]                  NO    []                                [x]                    ABDOMEN: Organomegaly YES       []                  NO    [x]                       [x]                     NEUROLOGICAL:  Nonfocal  YES       [x]                  NO    []                       [x]                    Hernia YES       []                  NO    [x]                   PSYCHIATRIC:  Appropriate  YES       [x]                  NO    []                       OTHER:                                                                                                   PAIN SCALE:: 3

## 2017-09-18 NOTE — NURSING NOTE
Chief Complaint   Patient presents with     Liver Transplant            Initial /80  Pulse 83  Temp 98.2  F (36.8  C) (Oral)  Resp 16  Ht 1.829 m (6')  Wt 93.6 kg (206 lb 5.6 oz)  SpO2 95%  BMI 27.99 kg/m2 Estimated body mass index is 27.99 kg/(m^2) as calculated from the following:    Height as of this encounter: 1.829 m (6').    Weight as of this encounter: 93.6 kg (206 lb 5.6 oz).

## 2017-09-18 NOTE — MR AVS SNAPSHOT
After Visit Summary   9/18/2017    Camacho Bhagat    MRN: [id number]           Patient Information     Date Of Birth          1965        Visit Information        Provider Department      9/18/2017 11:30 AM Jovan Tran MD [facility] Solid Organ Transplant        Today's Diagnoses     Liver transplant recipient (H)    -  1       Follow-ups after your visit        Your next 10 appointments already scheduled     Sep 20, 2017  2:00 PM CDT   Lab with  LAB   [facility] ([facility])    [address] [place] [address]  [address] [facility] [address]   [phone]            Sep 20, 2017  3:00 PM CDT   (Arrive by 2:30 PM)   New Patient Visit with Tl Higginbotham MD   [facility] Nephrology ([facility])    [address] [place] [address]  [address] [facility] [address]   [phone]            Sep 21, 2017 10:30 AM CDT   Procedure 3.5 hour with U2A ROOM 9   [address] [facility] ([facility], [facility])    [address] [facility] [address]               Sep 21, 2017 11:15 AM CDT   LAB with  LAB   [facility] ([facility])    [address] [place] [id number] [phone]           Patient must bring picture ID. Patient should be prepared to give a urine specimen  Please do not eat 10-12 hours before your appointment if you are coming in fasting for labs on lipids, cholesterol, or glucose (sugar). Pregnant women should follow their Care Team instructions. Water with medications is okay. Do not drink coffee or other fluids. If you have concerns about taking  your medications, please ask at office or if scheduling via Lovli, send a message by clicking on Secure Messaging, Message Your Care Team.            Sep 21, 2017 12:00 PM CDT   IR SINOGRAM INJECTION DIAGNOSTIC with UUIKATY   [facility] ([facility]  [facility])    [address] [facility] [address]   [phone]           1. You will need to have had a history and physical exam within 7 days of the procedure. 2. Laboratory test are to be obtained by your doctor prior to the exam (CBCP, INR and PTT) 3. Someone will need to drive you to and from the hospital. 4. If you are or may be pregnant, contact your doctor or a Radiology nurse prior to the day of the exam. 5. If you have diabetes, check with your doctor or a Radiology nurse to see if your insulin needs to be adjusted for the exam. 6. If you are taking Coumadin (to thin you blood) please contact your doctor or a Radiology nurse at least 3 days before the exam for special instructions. 7. The day before your exam you may eat your regular diet and are encouraged to drink at least 2 quarts of clear liquids. Drink no alcoholic beverages for 24 hours prior to the exam. 8. Do not eat any solid food or milk products for 6 hours prior to the exam. You may drink clear liquids until 2 hours prior to the exam. Clear liquids include the following: water, Jell-O, clear broth, apple juice or any noncarbonated drink that you can see through (no pop!) 9. The morning of the exam you may brush your teeth and take medications as directed with a sip of water. 10. Tell the Radiology nurse if you have any allergies.            Sep 25, 2017  9:00 AM CDT   Lab with  LAB   [facility] ([facility])    [address] [place] [address]   [phone]            Oct 02, 2017 11:00 AM CDT   Lab with  LAB   [facility] ([facility])    [address] [place] [address]   [phone]            Oct 02, 2017 11:30 AM CDT   (Arrive by 11:15 AM)   Liver Return Post Op with Jovan Tran MD   [facility] Solid Organ Transplant ([facility])    [address] [place] [address]  [facility] [address] [phone]              Future tests that were ordered for you today     Open Future Orders        Priority Expected Expires Ordered    CT Abdomen w contrast* Radiology After Discharge  12/17/2017 9/18/2017            Who to contact     If you have questions or need follow up information about today's clinic visit or your schedule please contact [facility] directly at [phone].  Normal or non-critical lab and imaging results will be communicated to you by [employer]art, letter or phone within 4 business days after the clinic has received the results. If you do not hear from us within 7 days, please contact the clinic through [employer] or phone. If you have a critical or abnormal lab result, we will notify you by phone as soon as possible.  Submit refill requests through [employer] or call your pharmacy and they will forward the refill request to us. Please allow 3 business days for your refill to be completed.          Additional Information About Your Visit        [employer]ar[employer] Information     [employer] gives you secure access to your electronic health record. If you see a primary care provider, you can also send messages to your care team and make appointments. If you have questions, please call your primary care clinic.  If you do not have a primary care provider, please call [phone] and they will assist you.        Care EveryWhere ID     This is your Care EveryWhere ID. This could be used by other organizations to access your [facility] medical records  [id number]        Your Vitals Were     Pulse Temperature Respirations Height Pulse Oximetry BMI (Body Mass Index)    83 98.2  F (36.8  C) (Oral) 16 1.829 m (6') 95% 27.99 kg/m2       Blood Pressure from Last 3 Encounters:   09/18/17 138/80   09/14/17 150/82   09/11/17 155/86    Weight from Last 3 Encounters:   09/18/17 93.6 kg (206 lb 5.6 oz)   09/11/17 92.4 kg (203 lb 9.6 oz)   09/08/17 93.1 kg (205 lb 3.2 oz)                Primary Care Provider Office Phone # Fax #    Shay Kirkpatrick [phone]       [address] [place] [address] [address]  [facility] [address]        Equal Access to Services     FRANKLIN PORTILLO : Todd tavo swartz melinda Carlisle, waaxda luqadaha, qaybta kaalmada magdaleno, devi de santiagojarrod duckworth. So [facility] [phone].    ATENCIÓN: Si habla español, tiene a zheng disposición servicios gratuitos de asistencia lingüística. Llame al [phone].    We comply with applicable federal civil rights laws and [place] laws. We do not discriminate on the basis of race, color, national origin, age, disability sex, sexual orientation or gender identity.            Thank you!     Thank you for choosing [facility]  for your care. Our goal is always to provide you with excellent care. Hearing back from our patients is one way we can continue to improve our services. Please take a few minutes to complete the written survey that you may receive in the mail after your visit with us. Thank you!             Your Updated Medication List - Protect others around you: Learn how to safely use, store and throw away your medicines at www.disposemymeds.org.          This list is accurate as of: 9/18/17 12:10 PM.  Always use your most recent med list.                   Brand Name Dispense Instructions for use Diagnosis    acetaminophen 325 MG tablet    TYLENOL    100 tablet    Take 2 tablets (650 mg) by mouth every 4 hours as needed for mild pain    Osteoarthritis, unspecified osteoarthritis type, unspecified site       amLODIPine 5 MG tablet    NORVASC    60 tablet    Take 2 tablets (10 mg) by mouth daily    History of liver transplant (H), Elevated blood pressure reading, Long-term use of immunosuppressant medication       cyclobenzaprine 10 MG tablet    FLEXERIL    90 tablet    Take 1 tablet (10 mg) by mouth 3 times daily as needed for muscle spasms    Immunosuppression (H)       fludrocortisone 0.1  MG tablet    FLORINEF    30 tablet    Take 1 tablet (0.1 mg) by mouth daily For elevated potassium    History of liver transplant (H), Long-term use of immunosuppressant medication       furosemide 20 MG tablet    LASIX    30 tablet    Take 1 tablet (20 mg) by mouth every other day    Hyperkalemia       * insulin aspart 100 UNIT/ML injection    NovoLOG FLEXPEN    3 mL    Inject 1-10 Units Subcutaneous 3 times daily (with meals) Pre-Meal  - 164 give 1 unit.   - 189 give 2 units.   - 214 give 3 units.   - 239 give 4 units.   - 264 give 5 units.   - 289 give 6 units.   - 314 give 7 units.   - 339 give 8 units.   - 364 give 9 units. BG greater than or equal to 365 give 10 units    Type 2 diabetes mellitus with diabetic polyneuropathy, unspecified long term insulin use status (H)       * insulin aspart 100 UNIT/ML injection    NovoLOG FLEXPEN    3 mL    Inject 1-7 Units Subcutaneous At Bedtime  - 224 give 1 units.   - 249 give 2 units.   - 274 give 3 units.   - 299 give 4 units.   - 324 give 5 units.   - 349 give 6 units.  BG greater than or equal to 350 give 7 units.    Type 2 diabetes mellitus with diabetic polyneuropathy, unspecified long term insulin use status (H)       * insulin aspart 100 UNIT/ML injection    NovoLOG PEN    3 mL    DOSE:  1 units per 10 grams of carbohydrate. With meals and snacks. Only chart total amount of units given.  Do not give if pre-prandial glucose is less than 60 mg/dL.    Liver transplant recipient (H)       insulin glargine 100 UNIT/ML injection    LANTUS    3 mL    Inject 5 Units Subcutaneous At Bedtime    Liver transplant recipient (H)       loperamide 2 MG capsule    IMODIUM    120 capsule    Take 2 capsules (4 mg) by mouth 2 times daily    S/P right hemicolectomy       miconazole with skin protectant 2 % Crea cream     1 Tube    Apply topically 2 times daily    Liver transplant recipient (H)        ondansetron 4 MG ODT tab    ZOFRAN-ODT    30 tablet    Take 1 tablet (4 mg) by mouth every 8 hours as needed for nausea    Cirrhosis of liver with ascites, unspecified hepatic cirrhosis type (H), Nausea       oxyCODONE 10 MG IR tablet    ROXICODONE    30 tablet    Take 0.5 tablets (5 mg) by mouth every 4 hours as needed for moderate to severe pain    History of liver transplant (H)       pantoprazole 40 MG EC tablet    PROTONIX    30 tablet    Take 1 tablet (40 mg) by mouth daily    S/P liver transplant (H)       prochlorperazine 5 MG tablet    COMPAZINE    90 tablet    Take 1-2 tablets (5-10 mg) by mouth every 6 hours as needed for nausea or vomiting    Aftercare following organ transplant, Status post liver transplantation (H)       sodium bicarbonate 650 MG tablet     90 tablet    Take 1 tablet (650 mg) by mouth 3 times daily    Liver transplant recipient (H)       sodium chloride (PF) 0.9% PF flush     1200 mL    10 mLs by Intracatheter route 2 times daily    Intra-abdominal abscess (H)       * tacrolimus 1 MG capsule    GENERIC EQUIVALENT    120 capsule    Take 2 capsules (2 mg) by mouth 2 times daily *total dose 2.5 mg BID    Liver transplant recipient (H)       * tacrolimus 0.5 MG capsule    GENERIC EQUIVALENT    60 capsule    Take 1 capsule (0.5 mg) by mouth 2 times daily *total dose 2.5 mg BID    Liver transplant recipient (H)       tadalafil 5 MG tablet    CIALIS    30 tablet    Take 1 tablet (5 mg) by mouth daily Never use with nitroglycerin, terazosin or doxazosin.    Drug-induced erectile dysfunction       * Notice:  This list has 5 medication(s) that are the same as other medications prescribed for you. Read the directions carefully, and ask your doctor or other care provider to review them with you.

## 2017-09-19 DIAGNOSIS — K65.1 INTRA-ABDOMINAL ABSCESS (H): ICD-10-CM

## 2017-09-20 ENCOUNTER — OFFICE VISIT (OUTPATIENT)
Dept: NEPHROLOGY | Facility: CLINIC | Age: 52
End: 2017-09-20
Attending: INTERNAL MEDICINE
Payer: COMMERCIAL

## 2017-09-20 ENCOUNTER — TELEPHONE (OUTPATIENT)
Dept: TRANSPLANT | Facility: CLINIC | Age: 52
End: 2017-09-20

## 2017-09-20 VITALS
HEIGHT: 72 IN | BODY MASS INDEX: 26.3 KG/M2 | HEART RATE: 88 BPM | SYSTOLIC BLOOD PRESSURE: 133 MMHG | WEIGHT: 194.2 LBS | OXYGEN SATURATION: 98 % | DIASTOLIC BLOOD PRESSURE: 84 MMHG

## 2017-09-20 DIAGNOSIS — N17.9 AKI (ACUTE KIDNEY INJURY) (H): Primary | ICD-10-CM

## 2017-09-20 DIAGNOSIS — Z94.4 HISTORY OF LIVER TRANSPLANT (H): ICD-10-CM

## 2017-09-20 DIAGNOSIS — D72.819 DECREASED WHITE BLOOD CELL COUNT: ICD-10-CM

## 2017-09-20 DIAGNOSIS — E87.5 HYPERKALEMIA: ICD-10-CM

## 2017-09-20 DIAGNOSIS — Z79.60 LONG-TERM USE OF IMMUNOSUPPRESSANT MEDICATION: ICD-10-CM

## 2017-09-20 DIAGNOSIS — Z94.4 LIVER TRANSPLANT RECIPIENT (H): ICD-10-CM

## 2017-09-20 LAB
ALBUMIN SERPL-MCNC: 2 G/DL (ref 3.4–5)
ALP SERPL-CCNC: 188 U/L (ref 40–150)
ALT SERPL W P-5'-P-CCNC: 18 U/L (ref 0–70)
ANION GAP SERPL CALCULATED.3IONS-SCNC: 7 MMOL/L (ref 3–14)
AST SERPL W P-5'-P-CCNC: 34 U/L (ref 0–45)
BILIRUB DIRECT SERPL-MCNC: 0.2 MG/DL (ref 0–0.2)
BILIRUB SERPL-MCNC: 0.4 MG/DL (ref 0.2–1.3)
BUN SERPL-MCNC: 24 MG/DL (ref 7–30)
CALCIUM SERPL-MCNC: 7.6 MG/DL (ref 8.5–10.1)
CHLORIDE SERPL-SCNC: 106 MMOL/L (ref 94–109)
CO2 SERPL-SCNC: 24 MMOL/L (ref 20–32)
CREAT SERPL-MCNC: 1.26 MG/DL (ref 0.66–1.25)
ERYTHROCYTE [DISTWIDTH] IN BLOOD BY AUTOMATED COUNT: 17.9 % (ref 10–15)
GFR SERPL CREATININE-BSD FRML MDRD: 60 ML/MIN/1.7M2
GLUCOSE SERPL-MCNC: 180 MG/DL (ref 70–99)
HCT VFR BLD AUTO: 18.9 % (ref 40–53)
HGB BLD-MCNC: 6.4 G/DL (ref 13.3–17.7)
MCH RBC QN AUTO: 31.2 PG (ref 26.5–33)
MCHC RBC AUTO-ENTMCNC: 33.9 G/DL (ref 31.5–36.5)
MCV RBC AUTO: 92 FL (ref 78–100)
PLATELET # BLD AUTO: 62 10E9/L (ref 150–450)
POTASSIUM SERPL-SCNC: 4.3 MMOL/L (ref 3.4–5.3)
PROT SERPL-MCNC: 6.7 G/DL (ref 6.8–8.8)
RBC # BLD AUTO: 2.05 10E12/L (ref 4.4–5.9)
SODIUM SERPL-SCNC: 137 MMOL/L (ref 133–144)
WBC # BLD AUTO: 4 10E9/L (ref 4–11)

## 2017-09-20 PROCEDURE — 85027 COMPLETE CBC AUTOMATED: CPT | Performed by: INTERNAL MEDICINE

## 2017-09-20 PROCEDURE — 80076 HEPATIC FUNCTION PANEL: CPT | Performed by: INTERNAL MEDICINE

## 2017-09-20 PROCEDURE — 99213 OFFICE O/P EST LOW 20 MIN: CPT | Mod: ZF

## 2017-09-20 PROCEDURE — 86901 BLOOD TYPING SEROLOGIC RH(D): CPT | Performed by: TRANSPLANT SURGERY

## 2017-09-20 PROCEDURE — 86923 COMPATIBILITY TEST ELECTRIC: CPT | Performed by: TRANSPLANT SURGERY

## 2017-09-20 PROCEDURE — 86900 BLOOD TYPING SEROLOGIC ABO: CPT | Performed by: TRANSPLANT SURGERY

## 2017-09-20 PROCEDURE — 80048 BASIC METABOLIC PNL TOTAL CA: CPT | Performed by: INTERNAL MEDICINE

## 2017-09-20 PROCEDURE — 86850 RBC ANTIBODY SCREEN: CPT | Performed by: TRANSPLANT SURGERY

## 2017-09-20 ASSESSMENT — PAIN SCALES - GENERAL: PAINLEVEL: NO PAIN (0)

## 2017-09-20 NOTE — LETTER
9/20/2017       RE: Camacho Bhagat  [address] [facility] [id number]     Dear Colleague,    Thank you for referring your patient, Camacho Bhagat, to the [facility] NEPHROLOGY at [facility]. Please see a copy of my visit note below.    Assessment and Plan:   1. EJ: stage 3 CKD now GFR 59, multiple EJ since liver transplant his prior baseline Cr was 0.6 to 0.7, his EJ is most likely multifactorial related to sepsis, contrast study and prograf, peaked at 3.2, and remain in the 2 range for long time, he is improving but his baseline changed to 0.9 to 1.2 now is 1.3, he did have some proteinuria and diabetes and hypertension , he is at risk of progression to advance kidney disease in the future, will advise to avoid nephrotoxic and renally dose his medication, try to limit contrast study as possible.   2. Hyperkalemia resolved now most likely related to prograf causing hypoaldosteronism which is resolved now, cont florenif 0.1 daily.    3. Proteinuria 1.3,  albumin, this could be to diabetes, improving from 1.5 in June, would consider workup paraproteinemia spep and light chain ratio.   4.  mg is low normal need replacement, bicarb no issue, calcium is normal if corrected with albumin, phos normal.   5. Hgb result came back after he left the clinic I called him and direct him to get it rechecked and to go to ED. He was seen by other physician and sent to the transfusion center.     Assessment and plan was discussed with patient and he voiced his understanding and agreement.    Patient seen and discussed  with Dr. Hernández.  Tl Higginbotham  Nephrology Fellow  Pager: [phone]      Consult:  Camacho Bhagat was seen in consultation at the request of Dr. KENNEY for elevated creatinine and hospital follow up.    Reason for Visit:  Camacho Bhagat is a 51 year old male with Diabetes and recent liver transplant, who presents for EJ and hyperkalemia.    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 During his hospital stay he was started in florenif and his K level improved. Since discharge he is feeling fine and denies any new complaint, still having drain to his abdomen and colostomy bag. No fever no nausea or vomiting, no abd pain, no urinary symptoms, no edema, no other complaint.           Kidney Disease and Medical Hx:       h/o HTN: Yes   Usual BP: Not checked       h/o DM: Yes        h/o Protein in Urine: Yes        h/o Blood in Urine: No       h/o Kidney Stones:No       h/o UTI  No       h/o Chronic NSAID Use: No         Previous Transplant Hx:      Yes; liver 3/4/2017       Uremic Symptoms:           ROS:   A comprehensive review of systems was obtained and negative, except as noted in the HPI or PMH.    Active Medical Problems:  Patient Active Problem List   Diagnosis     Carpal tunnel syndrome     Essential hypertension     Personal history of thrombophlebitis     Esophageal reflux     Depressive disorder, not elsewhere classified     Hyperlipidemia     Sleep apnea     Testicular hypofunction     HYPERLIPIDEMIA LDL GOAL <100     Fibromyalgia     OA (osteoarthritis)     Sicca syndrome (H)     Knee pain     Primary localized osteoarthrosis, lower leg     Diabetes mellitus with neurological manifestation (H)     Medication refill- do not delete      Pain medication agreement signed - Shay Kirkpatrick 2/7/14     Hepatocellular carcinoma (H)     Uncontrolled type 2 diabetes mellitus with hyperglycemia, with long-term current use of insulin (H)     Osteoarthritis      Rheumatoid arthritis (H)     Hyperkalemia     Liver transplant recipient (H)     Immunosuppressed status (H)     Neck pain     Back pain     Sepsis (H)     Typhlitis     Neutropenic colitis (H)     S/P liver transplant (H)     Retroperitoneal abscess (H)     Peritonitis and retroperitoneal infections (H)     Delirium, acute     Pancytopenia (H)     EJ (acute kidney injury) (H)     Hyperkalemic renal tubular acidosis     Inadequate oral intake     Gangrene of finger (H)     Ischemia of both lower extremities     PMH:   Medical record was reviewed and PMH was discussed with patient and noted below.  Past Medical History:   Diagnosis Date     Cancer (H)      Depressive disorder, not elsewhere classified      Esophageal reflux      Fibromyalgia 1/2009    dx with Dr Benitez( Rheum)     History of thrombophlebitis      Osteoarthritis      Other acute embolism veins 11/01    Deep vein thrombophlebitis, filter placed     Other and unspecified hyperlipidemia      Other chronic nonalcoholic liver disease     Fatty liver      Other testicular hypofunction      Pneumonia, organism 10-01    Included ARDS, sepsis, and  acute renal failure; hospitalized     Rheumatoid arthritis(714.0)      Type II or unspecified type diabetes mellitus without mention of complication, not stated as uncontrolled 11/01    Managed by endocrinology     Unspecified essential hypertension 11/01    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     Unspecified sleep apnea     Uses BiPAP     PSH:   Past Surgical History:   Procedure Laterality Date     BENCH LIVER N/A 3/4/2017    Procedure: BENCH LIVER;  Surgeon: Jovan Tran MD;  Location: [facility] OR     C NONSPECIFIC PROCEDURE      tracheostomy     C NONSPECIFIC PROCEDURE      repair of deviated septum     C NONSPECIFIC PROCEDURE  2007    Rt knee arthroscopy     C TOTAL KNEE ARTHROPLASTY  2008    Right knee arthroscopy     CHOLECYSTECTOMY       COLONOSCOPY N/A 7/21/2017    Procedure: COMBINED  COLONOSCOPY, SINGLE OR MULTIPLE BIOPSY/POLYPECTOMY BY BIOPSY;  Colonoscopy;  Surgeon: Izaiah Montes MD;  Location: [facility] GI     ESOPHAGOSCOPY, GASTROSCOPY, DUODENOSCOPY (EGD), COMBINED N/A 2016    Procedure: COMBINED ESOPHAGOSCOPY, GASTROSCOPY, DUODENOSCOPY (EGD), BIOPSY SINGLE OR MULTIPLE;  Surgeon: Trent Pederson MD;  Location:  GI     ESOPHAGOSCOPY, GASTROSCOPY, DUODENOSCOPY (EGD), COMBINED N/A 2017    Procedure: COMBINED ESOPHAGOSCOPY, GASTROSCOPY, DUODENOSCOPY (EGD);;  Surgeon: Los Wynn MD;  Location: [facility] GI     LAPAROTOMY EXPLORATORY N/A 2017    Procedure: LAPAROTOMY EXPLORATORY;  Exploratory Laparotomy, washout;  Surgeon: Tip Zhang MD;  Location: [facility] OR     LAPAROTOMY EXPLORATORY N/A 2017    Procedure: LAPAROTOMY EXPLORATORY;  Exploratory Laparotomy, Washout with closure.;  Surgeon: Tip Zhang MD;  Location: [facility] OR     LAPAROTOMY EXPLORATORY N/A 2017    Procedure: LAPAROTOMY EXPLORATORY;  Exploratory Laparotomy, Right angelique-colectomy, end ileostomy, mucosal fistula, partial omentectomy;  Surgeon: Sara Dinh MD;  Location: [facility] OR     TRANSPLANT LIVER RECIPIENT  DONOR N/A 3/4/2017    Procedure: TRANSPLANT LIVER RECIPIENT  DONOR;  Surgeon: Jovan Tran MD;  Location: [facility] OR       Family Hx:   Family History   Problem Relation Age of Onset     DIABETES Father      Hypertension Father      Substance Abuse Father      Arthritis Mother      CANCER Mother      Thyroid     Thyroid Disease Mother      Other Cancer Mother      Colon Cancer No family hx of      Hyperlipidemia No family hx of      Coronary Artery Disease No family hx of      CEREBROVASCULAR DISEASE No family hx of      Breast Cancer No family hx of      Prostate Cancer No family hx of      Personal Hx:   Social History     Social History     Marital status:      Spouse name: N/A     Number of children: 1     Years of education: N/A      Occupational History            Social History Main Topics     Smoking status: Former Smoker     Smokeless tobacco: Former User     Types: Chew     Quit date: 10/8/2015      Comment: Has used chewing tobacco since age 15 , chewed for 20yrs      Alcohol use No      Comment: last drink about a year ago (quit 2001)     Drug use: No     Sexual activity: Yes     Partners: Female     Other Topics Concern     Not on file     Social History Narrative    uSED TO BE      Back in school now           Allergies:  Allergies   Allergen Reactions     Erythromycin GI Disturbance     Vioxx      Nausea, vomiting       Medications:  Prior to Admission medications    Medication Sig Start Date End Date Taking? Authorizing Provider   sodium chloride, PF, 0.9% PF flush Irrigate with 20 mLs as directed 2 times daily *10 mLs per drain 9/20/17  Yes Jovan Tran MD   Sodium Chloride Flush (SALINE FLUSH IV)    Yes Reported, Patient   tacrolimus (GENERIC EQUIVALENT) 1 MG capsule Take 2 capsules (2 mg) by mouth 2 times daily *total dose 2.5 mg BID 9/15/17  Yes Jovan Tran MD   tacrolimus (GENERIC EQUIVALENT) 0.5 MG capsule Take 1 capsule (0.5 mg) by mouth 2 times daily *total dose 2.5 mg BID 9/15/17  Yes Jovan Tran MD   oxyCODONE (ROXICODONE) 10 MG IR tablet Take 0.5 tablets (5 mg) by mouth every 4 hours as needed for moderate to severe pain 9/11/17  Yes Jovan Tran MD   acetaminophen (TYLENOL) 325 MG tablet Take 2 tablets (650 mg) by mouth every 4 hours as needed for mild pain 9/8/17  Yes Felicia Tolliver APRN CNP   loperamide (IMODIUM) 2 MG capsule Take 2 capsules (4 mg) by mouth 2 times daily 9/8/17  Yes Felicia Tolliver APRN CNP   amLODIPine (NORVASC) 5 MG tablet Take 2 tablets (10 mg) by mouth daily 9/8/17  Yes Felicia Tolliver APRN CNP   fludrocortisone (FLORINEF) 0.1 MG tablet Take 1 tablet (0.1 mg) by mouth daily For elevated potassium 9/8/17  Yes  Felicia Tolliver APRN CNP   miconazole with skin protectant (LEXI ANTIFUNGAL) 2 % CREA cream Apply topically 2 times daily 9/8/17  Yes Felicia Tolliver APRN CNP   furosemide (LASIX) 20 MG tablet Take 1 tablet (20 mg) by mouth every other day 9/9/17  Yes Felicia Tolliver APRN CNP   cyclobenzaprine (FLEXERIL) 10 MG tablet Take 1 tablet (10 mg) by mouth 3 times daily as needed for muscle spasms 9/8/17  Yes Felicia Tolliver APRN CNP   pantoprazole (PROTONIX) 40 MG EC tablet Take 1 tablet (40 mg) by mouth daily 9/8/17  Yes Felicia Tolliver APRN CNP   sodium bicarbonate 650 MG tablet Take 1 tablet (650 mg) by mouth 3 times daily 9/8/17  Yes Felicia Tolliver APRN CNP   insulin glargine (LANTUS) 100 UNIT/ML injection Inject 5 Units Subcutaneous At Bedtime 9/8/17  Yes Felicia Tolliver APRN CNP   insulin aspart (NOVOLOG FLEXPEN) 100 UNIT/ML injection Inject 1-10 Units Subcutaneous 3 times daily (with meals) Pre-Meal  - 164 give 1 unit.    - 189 give 2 units.    - 214 give 3 units.    - 239 give 4 units.    - 264 give 5 units.    - 289 give 6 units.    - 314 give 7 units.    - 339 give 8 units.    - 364 give 9 units.  BG greater than or equal to 365 give 10 units 9/8/17  Yes Felicia Tolliver APRN CNP   insulin aspart (NOVOLOG FLEXPEN) 100 UNIT/ML injection Inject 1-7 Units Subcutaneous At Bedtime  - 224 give 1 units.    - 249 give 2 units.    - 274 give 3 units.    - 299 give 4 units.    - 324 give 5 units.    - 349 give 6 units.   BG greater than or equal to 350 give 7 units. 9/8/17  Yes Felicia Tolliver APRN CNP   insulin aspart (NOVOLOG PEN) 100 UNIT/ML injection DOSE:  1 units per 10 grams of carbohydrate. With meals and snacks. Only chart total amount of units given.  Do not give if pre-prandial glucose is less than 60 mg/dL. 9/8/17  Yes Felicia Tolliver, LAMIN CNP   prochlorperazine (COMPAZINE) 5 MG tablet Take 1-2 tablets  (5-10 mg) by mouth every 6 hours as needed for nausea or vomiting 3/13/17  Yes Dora Elizabeth NP   ondansetron (ZOFRAN-ODT) 4 MG ODT tab Take 1 tablet (4 mg) by mouth every 8 hours as needed for nausea 3/10/17  Yes Ada Driver PA-C   tadalafil (CIALIS) 5 MG tablet Take 1 tablet (5 mg) by mouth daily Never use with nitroglycerin, terazosin or doxazosin.  Patient not taking: Reported on 9/20/2017 5/4/17   Toñito Rivas MD     Vitals:  /84  Pulse 88  Ht 1.829 m (6')  Wt 88.1 kg (194 lb 3.2 oz)  SpO2 98%  BMI 26.34 kg/m2    Exam:  GENERAL APPEARANCE: alert and no distress  HENT: mouth without ulcers or lesions  LYMPHATICS: no cervical or supraclavicular nodes  RESP: lungs clear to auscultation - no rales, rhonchi or wheezes  CV: regular rhythm, normal rate, no rub, no murmur  EDEMA: no LE edema bilaterally  ABDOMEN: soft, nondistended, nontender, bowel sounds normal,   MS: extremities normal - no gross deformities noted, no evidence of inflammation in joints, no muscle tenderness  SKIN: no rash      Results:  Recent Results (from the past 336 hour(s))   Glucose by meter    Collection Time: 09/06/17  5:09 PM   Result Value Ref Range    Glucose 132 (H) 70 - 99 mg/dL   Glucose by meter    Collection Time: 09/06/17  9:26 PM   Result Value Ref Range    Glucose 128 (H) 70 - 99 mg/dL   Glucose by meter    Collection Time: 09/07/17  2:12 AM   Result Value Ref Range    Glucose 118 (H) 70 - 99 mg/dL   Magnesium    Collection Time: 09/07/17  8:05 AM   Result Value Ref Range    Magnesium 2.0 1.6 - 2.3 mg/dL   Phosphorus    Collection Time: 09/07/17  8:05 AM   Result Value Ref Range    Phosphorus 4.8 (H) 2.5 - 4.5 mg/dL   Basic metabolic panel    Collection Time: 09/07/17  8:05 AM   Result Value Ref Range    Sodium 132 (L) 133 - 144 mmol/L    Potassium 4.8 3.4 - 5.3 mmol/L    Chloride 104 94 - 109 mmol/L    Carbon Dioxide 23 20 - 32 mmol/L    Anion Gap 6 3 - 14 mmol/L    Glucose 200 (H) 70 - 99 mg/dL     Urea Nitrogen 26 7 - 30 mg/dL    Creatinine 1.33 (H) 0.66 - 1.25 mg/dL    GFR Estimate 56 (L) >60 mL/min/1.7m2    GFR Estimate If Black 68 >60 mL/min/1.7m2    Calcium 7.7 (L) 8.5 - 10.1 mg/dL   Hepatic panel    Collection Time: 09/07/17  8:05 AM   Result Value Ref Range    Bilirubin Direct 0.2 0.0 - 0.2 mg/dL    Bilirubin Total 0.4 0.2 - 1.3 mg/dL    Albumin 1.8 (L) 3.4 - 5.0 g/dL    Protein Total 6.5 (L) 6.8 - 8.8 g/dL    Alkaline Phosphatase 196 (H) 40 - 150 U/L    ALT 12 0 - 70 U/L    AST 26 0 - 45 U/L   CBC with platelets    Collection Time: 09/07/17  8:05 AM   Result Value Ref Range    WBC 4.0 4.0 - 11.0 10e9/L    RBC Count 1.84 (L) 4.4 - 5.9 10e12/L    Hemoglobin 5.5 (LL) 13.3 - 17.7 g/dL    Hematocrit 16.8 (L) 40.0 - 53.0 %    MCV 91 78 - 100 fl    MCH 29.9 26.5 - 33.0 pg    MCHC 32.7 31.5 - 36.5 g/dL    RDW 20.3 (H) 10.0 - 15.0 %    Platelet Count 71 (L) 150 - 450 10e9/L   CMV DNA quantification    Collection Time: 09/07/17  8:05 AM   Result Value Ref Range    CMV DNA Quantitation Specimen EDTA PLASMA     CMV Quant IU/mL CMV DNA Not Detected CMVND^CMV DNA Not Detected [IU]/mL    Log IU/mL of CMVQNT Not Calculated <2.1 [Log_IU]/mL   ABO/Rh type and screen    Collection Time: 09/07/17  8:05 AM   Result Value Ref Range    Units Ordered 2     ABO O     RH(D) Pos     Antibody Screen Neg     Test Valid Only At          [facility]    Specimen Expires 09/10/2017     Crossmatch Red Blood Cells    Blood component    Collection Time: 09/07/17  8:05 AM   Result Value Ref Range    Unit Number [id number]     Blood Component Type Red Blood Cells Leukocyte Reduced     Division Number 00     Status of Unit Released to care unit     Blood Product Code X4094V18     Unit Status ISS    Blood component    Collection Time: 09/07/17  8:05 AM   Result Value Ref Range    Unit Number [id number]     Blood Component Type Red Blood Cells Leukocyte Reduced     Division Number 00      Status of Unit Released to care unit     Blood Product Code M2639C76     Unit Status ISS    Glucose by meter    Collection Time: 09/07/17  9:25 AM   Result Value Ref Range    Glucose 169 (H) 70 - 99 mg/dL   Hemoglobin    Collection Time: 09/07/17  9:44 AM   Result Value Ref Range    Hemoglobin 6.8 (LL) 13.3 - 17.7 g/dL   Glucose by meter    Collection Time: 09/07/17 12:40 PM   Result Value Ref Range    Glucose 182 (H) 70 - 99 mg/dL   Hemoglobin    Collection Time: 09/07/17  4:10 PM   Result Value Ref Range    Hemoglobin 7.9 (L) 13.3 - 17.7 g/dL   Iron and iron binding capacity    Collection Time: 09/07/17  4:10 PM   Result Value Ref Range    Iron 28 (L) 35 - 180 ug/dL    Iron Binding Cap 138 (L) 240 - 430 ug/dL    Iron Saturation Index 20 15 - 46 %   Haptoglobin    Collection Time: 09/07/17  4:10 PM   Result Value Ref Range    Haptoglobin 152 15 - 200 mg/dL   Reticulocyte count    Collection Time: 09/07/17  4:10 PM   Result Value Ref Range    % Retic 3.8 (H) 0.5 - 2.0 %    Absolute Retic 96.3 (H) 25 - 95 10e9/L   Lactate Dehydrogenase    Collection Time: 09/07/17  4:10 PM   Result Value Ref Range    Lactate Dehydrogenase 393 (H) 85 - 227 U/L   Blood Morphology Pathologist Review    Collection Time: 09/07/17  4:10 PM   Result Value Ref Range    Copath Report       Patient Name: RONNIE ARIZMENDI  MR#: [id number]  Specimen #: [id number]  Collected: 9/7/2017  Received: 9/8/2017  Reported: 9/8/2017 10:05  Ordering Phy(s): MIKI CANO    For improved result formatting, select 'View Enhanced Report Format'  under Linked Documents section.    TEST(S):  Blood Smear Morphology    FINAL DIAGNOSIS:  Peripheral blood smear:       Marked normochromic normocytic anemia with a slight increase in  erythrocyte regeneration       Slight leukopenia due to lymphocytopenia       Marked thrombocytopenia with overall normal platelet morphology    COMMENT:  The overall findings are insufficient for a morphologic diagnosis  of  hemolysis.   If clinical suspicion is high for hemolysis, serial  determinations of D-dimer, fibrinogen, LDH, and haptoglobin may be  helpful.    I have personally reviewed all specimens and/or slides, including the  listed special stains, and used them with my medical judgment to  determine the final diagnosis.    Electronically signed out  by:    Momo Harkins M.D.,[facility]    Technical testing/processing performed at [place], [place]    CLINICAL HISTORY:  51 year old man with history of CASTAÑEDA cirrhosis, status post liver  transplant, now with pancytopenia    MICROSCOPIC DESCRIPTION:  The red cells appear normochromic.  Poikilocytosis is minimal.  Polychromasia is slightly increased.  Rouleaux formation is not  increased.  The morphology of the platelets is normal.    CLINICAL LAB RESULTS:  Battery Order No. Lab Test Code Clinical Result Ref. Range Units Result  Date  Hemogram/Diff/PLT K05314 BU WBC Count L 3.8 4.0-11.0 10e9/L 9/7/2017  17:10       RBC Count L 2.54 4.4-5.9 10e12/L 9/7/2017 17:10       Hemoglobin L 7.9 13.3-17.7 g/dL 9/7/2017 17:10       Hematocrit L 23.1 40.0-53.0 % 9/7/2017 17:10       MCV 91  fl 9/7/2017 17:10       MCH 31.1 26.5-33.0 pg 9/7/2017 17:10       MCHC 34.2 31.5-36.5 g/dL 9/7/2017 17:10       RDW H 19.4 10.0-15.0 % 9/7/20 17 17:10       Platelet Count L 74 150-450 10e9/L 9/7/2017 17:10        SEE TEXT   9/7/2017 17:10       Text/Comments:  Automated Method       % Neutrophils 67.7  % 9/7/2017 17:10       % Lymphocytes 14.4  % 9/7/2017 17:10       % Monocytes 16.0  % 9/7/2017 17:10       % Eosinophils 0.8  % 9/7/2017 17:10       % Basophils 0.3  % 9/7/2017 17:10       % Immature Grans 0.8  % 9/7/2017 17:10       Nucleated RBCs 0 0 /100 9/7/2017 17:10       abs Neutrophils 2.5 1.6-8.3 10e9/L 9/7/2017 17:10       abs Lymphocytes L 0.5 0.8-5.3 10e9/L 9/7/2017 17:10       abs Monocytes 0.6 0.0-1.3 10e9/L 9/7/2017  17:10       abs Eosinophils 0.0 0.0-0.7 10e9/L 9/7/2017 17:10       abs Basophils 0.0 0.0-0.2 10e9/L 9/7/2017 17:10       abs Imm Granulocytes 0.0 0-0.4 10e9/L 9/7/2017 17:10       abs NRBC 0.0   9/7/2017 17:10    CPT Codes:  A: 88780-VWAUN    TESTING LAB LOCATION:  [address]   [id number] [phone]     COLLECTION SITE:  Client:  [facility]  Location:  [id number] (B)     CBC with platelets differential    Collection Time: 09/07/17  4:10 PM   Result Value Ref Range    WBC 3.8 (L) 4.0 - 11.0 10e9/L    RBC Count 2.54 (L) 4.4 - 5.9 10e12/L    Hemoglobin 7.9 (L) 13.3 - 17.7 g/dL    Hematocrit 23.1 (L) 40.0 - 53.0 %    MCV 91 78 - 100 fl    MCH 31.1 26.5 - 33.0 pg    MCHC 34.2 31.5 - 36.5 g/dL    RDW 19.4 (H) 10.0 - 15.0 %    Platelet Count 74 (L) 150 - 450 10e9/L    Diff Method Automated Method     % Neutrophils 67.7 %    % Lymphocytes 14.4 %    % Monocytes 16.0 %    % Eosinophils 0.8 %    % Basophils 0.3 %    % Immature Granulocytes 0.8 %    Nucleated RBCs 0 0 /100    Absolute Neutrophil 2.5 1.6 - 8.3 10e9/L    Absolute Lymphocytes 0.5 (L) 0.8 - 5.3 10e9/L    Absolute Monocytes 0.6 0.0 - 1.3 10e9/L    Absolute Eosinophils 0.0 0.0 - 0.7 10e9/L    Absolute Basophils 0.0 0.0 - 0.2 10e9/L    Abs Immature Granulocytes 0.0 0 - 0.4 10e9/L    Absolute Nucleated RBC 0.0    Glucose by meter    Collection Time: 09/07/17  5:15 PM   Result Value Ref Range    Glucose 206 (H) 70 - 99 mg/dL   Glucose by meter    Collection Time: 09/07/17  9:58 PM   Result Value Ref Range    Glucose 214 (H) 70 - 99 mg/dL   Glucose by meter    Collection Time: 09/08/17  1:27 AM   Result Value Ref Range    Glucose 200 (H) 70 - 99 mg/dL   Magnesium    Collection Time: 09/08/17  6:57 AM   Result Value Ref Range    Magnesium 2.0 1.6 - 2.3 mg/dL   Phosphorus    Collection Time: 09/08/17  6:57 AM   Result Value Ref Range     Phosphorus 3.7 2.5 - 4.5 mg/dL   Basic metabolic panel    Collection Time: 09/08/17  6:57 AM   Result Value Ref Range    Sodium 134 133 - 144 mmol/L    Potassium 5.3 3.4 - 5.3 mmol/L    Chloride 104 94 - 109 mmol/L    Carbon Dioxide 24 20 - 32 mmol/L    Anion Gap 6 3 - 14 mmol/L    Glucose 111 (H) 70 - 99 mg/dL    Urea Nitrogen 27 7 - 30 mg/dL    Creatinine 1.21 0.66 - 1.25 mg/dL    GFR Estimate 63 >60 mL/min/1.7m2    GFR Estimate If Black 76 >60 mL/min/1.7m2    Calcium 8.3 (L) 8.5 - 10.1 mg/dL   Tacrolimus level    Collection Time: 09/08/17  6:57 AM   Result Value Ref Range    Tacrolimus Last Dose Not Provided     Tacrolimus Level 3.9 (L) 5.0 - 15.0 ug/L   CBC with platelets    Collection Time: 09/08/17  6:57 AM   Result Value Ref Range    WBC 3.7 (L) 4.0 - 11.0 10e9/L    RBC Count 2.72 (L) 4.4 - 5.9 10e12/L    Hemoglobin 8.4 (L) 13.3 - 17.7 g/dL    Hematocrit 24.7 (L) 40.0 - 53.0 %    MCV 91 78 - 100 fl    MCH 30.9 26.5 - 33.0 pg    MCHC 34.0 31.5 - 36.5 g/dL    RDW 18.9 (H) 10.0 - 15.0 %    Platelet Count 74 (L) 150 - 450 10e9/L   Glucose by meter    Collection Time: 09/08/17 11:58 AM   Result Value Ref Range    Glucose 176 (H) 70 - 99 mg/dL   Tacrolimus level    Collection Time: 09/11/17 10:50 AM   Result Value Ref Range    Tacrolimus Last Dose 10PM 09.10.2017     Tacrolimus Level <3.0 (L) 5.0 - 15.0 ug/L   Phosphorus    Collection Time: 09/11/17 10:50 AM   Result Value Ref Range    Phosphorus 4.5 2.5 - 4.5 mg/dL   Magnesium    Collection Time: 09/11/17 10:50 AM   Result Value Ref Range    Magnesium 1.6 1.6 - 2.3 mg/dL   Hepatic panel    Collection Time: 09/11/17 10:50 AM   Result Value Ref Range    Bilirubin Direct 0.3 (H) 0.0 - 0.2 mg/dL    Bilirubin Total 0.6 0.2 - 1.3 mg/dL    Albumin 2.3 (L) 3.4 - 5.0 g/dL    Protein Total 7.6 6.8 - 8.8 g/dL    Alkaline Phosphatase 206 (H) 40 - 150 U/L    ALT 22 0 - 70 U/L    AST 48 (H) 0 - 45 U/L   CBC with platelets    Collection Time: 09/11/17 10:50 AM   Result Value Ref  Range    WBC 3.6 (L) 4.0 - 11.0 10e9/L    RBC Count 2.91 (L) 4.4 - 5.9 10e12/L    Hemoglobin 8.7 (L) 13.3 - 17.7 g/dL    Hematocrit 26.9 (L) 40.0 - 53.0 %    MCV 92 78 - 100 fl    MCH 29.9 26.5 - 33.0 pg    MCHC 32.3 31.5 - 36.5 g/dL    RDW 18.4 (H) 10.0 - 15.0 %    Platelet Count 91 (L) 150 - 450 10e9/L   Basic metabolic panel    Collection Time: 09/11/17 10:50 AM   Result Value Ref Range    Sodium 136 133 - 144 mmol/L    Potassium 5.6 (H) 3.4 - 5.3 mmol/L    Chloride 105 94 - 109 mmol/L    Carbon Dioxide 23 20 - 32 mmol/L    Anion Gap 8 3 - 14 mmol/L    Glucose 244 (H) 70 - 99 mg/dL    Urea Nitrogen 30 7 - 30 mg/dL    Creatinine 1.38 (H) 0.66 - 1.25 mg/dL    GFR Estimate 54 (L) >60 mL/min/1.7m2    GFR Estimate If Black 65 >60 mL/min/1.7m2    Calcium 8.4 (L) 8.5 - 10.1 mg/dL   WBC Differential    Collection Time: 09/11/17 10:50 AM   Result Value Ref Range    Diff Method Manual Differential     % Neutrophils 66.3 %    % Lymphocytes 23.9 %    % Monocytes 6.2 %    % Eosinophils 2.7 %    % Basophils 0.9 %    Absolute Neutrophil 2.4 1.6 - 8.3 10e9/L    Absolute Lymphocytes 0.9 0.8 - 5.3 10e9/L    Absolute Monocytes 0.2 0.0 - 1.3 10e9/L    Absolute Eosinophils 0.1 0.0 - 0.7 10e9/L    Absolute Basophils 0.0 0.0 - 0.2 10e9/L    Anisocytosis Moderate    Tacrolimus level    Collection Time: 09/14/17 11:33 AM   Result Value Ref Range    Tacrolimus Last Dose 09/13/17 @ 23:00     Tacrolimus Level <3.0 (L) 5.0 - 15.0 ug/L   Magnesium    Collection Time: 09/14/17 11:33 AM   Result Value Ref Range    Magnesium 1.6 1.6 - 2.3 mg/dL   CBC with platelets    Collection Time: 09/14/17 11:33 AM   Result Value Ref Range    WBC 4.2 4.0 - 11.0 10e9/L    RBC Count 2.77 (L) 4.4 - 5.9 10e12/L    Hemoglobin 8.5 (L) 13.3 - 17.7 g/dL    Hematocrit 25.6 (L) 40.0 - 53.0 %    MCV 92 78 - 100 fl    MCH 30.7 26.5 - 33.0 pg    MCHC 33.2 31.5 - 36.5 g/dL    RDW 17.9 (H) 10.0 - 15.0 %    Platelet Count 103 (L) 150 - 450 10e9/L   WBC Differential     Collection Time: 09/14/17 11:33 AM   Result Value Ref Range    Diff Method Automated Method     % Neutrophils 50.3 %    % Lymphocytes 32.1 %    % Monocytes 9.5 %    % Eosinophils 2.4 %    % Basophils 0.7 %    % Immature Granulocytes 5.0 %    Nucleated RBCs 0 0 /100    Absolute Neutrophil 2.1 1.6 - 8.3 10e9/L    Absolute Lymphocytes 1.4 0.8 - 5.3 10e9/L    Absolute Monocytes 0.4 0.0 - 1.3 10e9/L    Absolute Eosinophils 0.1 0.0 - 0.7 10e9/L    Absolute Basophils 0.0 0.0 - 0.2 10e9/L    Abs Immature Granulocytes 0.2 0 - 0.4 10e9/L    Absolute Nucleated RBC 0.0    Renal panel    Collection Time: 09/14/17 11:33 AM   Result Value Ref Range    Sodium 135 133 - 144 mmol/L    Potassium 5.0 3.4 - 5.3 mmol/L    Chloride 105 94 - 109 mmol/L    Carbon Dioxide 22 20 - 32 mmol/L    Anion Gap 7 3 - 14 mmol/L    Glucose 209 (H) 70 - 99 mg/dL    Urea Nitrogen 28 7 - 30 mg/dL    Creatinine 1.27 (H) 0.66 - 1.25 mg/dL    GFR Estimate 59 (L) >60 mL/min/1.7m2    GFR Estimate If Black 72 >60 mL/min/1.7m2    Calcium 8.4 (L) 8.5 - 10.1 mg/dL    Phosphorus 3.4 2.5 - 4.5 mg/dL    Albumin 2.4 (L) 3.4 - 5.0 g/dL   Vitamin D Deficiency    Collection Time: 09/14/17 11:33 AM   Result Value Ref Range    Vitamin D Deficiency screening 20 20 - 75 ug/L   Parathyroid Hormone Intact    Collection Time: 09/14/17 11:33 AM   Result Value Ref Range    Parathyroid Hormone Intact 34 12 - 72 pg/mL   IRON AND IRON BINDING CAPACITY    Collection Time: 09/14/17 11:33 AM   Result Value Ref Range    Iron 39 35 - 180 ug/dL    Iron Binding Cap 182 (L) 240 - 430 ug/dL    Iron Saturation Index 22 15 - 46 %   FERRITIN    Collection Time: 09/14/17 11:33 AM   Result Value Ref Range    Ferritin 2947 (H) 26 - 388 ng/mL   Alkaline phosphatase    Collection Time: 09/14/17 11:33 AM   Result Value Ref Range    Alkaline Phosphatase 221 (H) 40 - 150 U/L   ALT    Collection Time: 09/14/17 11:33 AM   Result Value Ref Range    ALT 26 0 - 70 U/L   AST    Collection Time: 09/14/17  11:33 AM   Result Value Ref Range    AST 55 (H) 0 - 45 U/L   Bilirubin  total    Collection Time: 09/14/17 11:33 AM   Result Value Ref Range    Bilirubin Total 0.8 0.2 - 1.3 mg/dL   Bilirubin direct    Collection Time: 09/14/17 11:33 AM   Result Value Ref Range    Bilirubin Direct 0.2 0.0 - 0.2 mg/dL   Protein total    Collection Time: 09/14/17 11:33 AM   Result Value Ref Range    Protein Total 8.0 6.8 - 8.8 g/dL   Protein  random urine with Creat Ratio    Collection Time: 09/14/17 11:36 AM   Result Value Ref Range    Protein Random Urine 1.18 g/L    Protein Total Urine g/gr Creatinine 1.37 (H) 0 - 0.2 g/g Cr   Routine UA with microscopic    Collection Time: 09/14/17 11:36 AM   Result Value Ref Range    Color Urine Yellow     Appearance Urine Slightly Cloudy     Glucose Urine 50 (A) NEG^Negative mg/dL    Bilirubin Urine Negative NEG^Negative    Ketones Urine Negative NEG^Negative mg/dL    Specific Gravity Urine 1.012 1.003 - 1.035    Blood Urine Negative NEG^Negative    pH Urine 5.0 5.0 - 7.0 pH    Protein Albumin Urine 100 (A) NEG^Negative mg/dL    Urobilinogen mg/dL 0.0 0.0 - 2.0 mg/dL    Nitrite Urine Negative NEG^Negative    Leukocyte Esterase Urine Negative NEG^Negative    Source Midstream Urine     WBC Urine 2 0 - 2 /HPF    RBC Urine 3 (H) 0 - 2 /HPF   Creatinine urine calculation only    Collection Time: 09/14/17 11:36 AM   Result Value Ref Range    Creatinine Urine 86 mg/dL   Tacrolimus level    Collection Time: 09/18/17 11:20 AM   Result Value Ref Range    Tacrolimus Last Dose 09/17 2245     Tacrolimus Level <3.0 (L) 5.0 - 15.0 ug/L   Phosphorus    Collection Time: 09/18/17 11:20 AM   Result Value Ref Range    Phosphorus 3.7 2.5 - 4.5 mg/dL   Magnesium    Collection Time: 09/18/17 11:20 AM   Result Value Ref Range    Magnesium 1.5 (L) 1.6 - 2.3 mg/dL   Hepatic panel    Collection Time: 09/18/17 11:20 AM   Result Value Ref Range    Bilirubin Direct 0.2 0.0 - 0.2 mg/dL    Bilirubin Total 0.6 0.2 - 1.3 mg/dL     Albumin 2.4 (L) 3.4 - 5.0 g/dL    Protein Total 7.7 6.8 - 8.8 g/dL    Alkaline Phosphatase 225 (H) 40 - 150 U/L    ALT 22 0 - 70 U/L    AST 40 0 - 45 U/L   CBC with platelets    Collection Time: 09/18/17 11:20 AM   Result Value Ref Range    WBC 4.7 4.0 - 11.0 10e9/L    RBC Count 2.62 (L) 4.4 - 5.9 10e12/L    Hemoglobin 8.0 (L) 13.3 - 17.7 g/dL    Hematocrit 23.8 (L) 40.0 - 53.0 %    MCV 91 78 - 100 fl    MCH 30.5 26.5 - 33.0 pg    MCHC 33.6 31.5 - 36.5 g/dL    RDW 18.0 (H) 10.0 - 15.0 %    Platelet Count 93 (L) 150 - 450 10e9/L   Basic metabolic panel    Collection Time: 09/18/17 11:20 AM   Result Value Ref Range    Sodium 136 133 - 144 mmol/L    Potassium 4.6 3.4 - 5.3 mmol/L    Chloride 104 94 - 109 mmol/L    Carbon Dioxide 24 20 - 32 mmol/L    Anion Gap 8 3 - 14 mmol/L    Glucose 213 (H) 70 - 99 mg/dL    Urea Nitrogen 22 7 - 30 mg/dL    Creatinine 1.30 (H) 0.66 - 1.25 mg/dL    GFR Estimate 58 (L) >60 mL/min/1.7m2    GFR Estimate If Black 70 >60 mL/min/1.7m2    Calcium 8.1 (L) 8.5 - 10.1 mg/dL   WBC Differential    Collection Time: 09/18/17 11:20 AM   Result Value Ref Range    Diff Method Manual Differential     % Neutrophils 59.1 %    % Lymphocytes 28.7 %    % Monocytes 6.1 %    % Eosinophils 2.6 %    % Basophils 1.7 %    % Metamyelocytes 0.9 %    % Myelocytes 0.9 %    Absolute Neutrophil 2.8 1.6 - 8.3 10e9/L    Absolute Lymphocytes 1.3 0.8 - 5.3 10e9/L    Absolute Monocytes 0.3 0.0 - 1.3 10e9/L    Absolute Eosinophils 0.1 0.0 - 0.7 10e9/L    Absolute Basophils 0.1 0.0 - 0.2 10e9/L    Absolute Metamyelocytes 0.0 0 10e9/L    Absolute Myelocytes 0.0 0 10e9/L    Anisocytosis Slight        Answers for HPI/ROS submitted by the patient on 9/18/2017   General Symptoms: No  Skin Symptoms: No  HENT Symptoms: No  EYE SYMPTOMS: No  HEART SYMPTOMS: No  LUNG SYMPTOMS: No  INTESTINAL SYMPTOMS: No  URINARY SYMPTOMS: No  REPRODUCTIVE SYMPTOMS: No  SKELETAL SYMPTOMS: No  BLOOD SYMPTOMS: No  NERVOUS SYSTEM SYMPTOMS: No  MENTAL  HEALTH SYMPTOMS: No      Attestation:  This patient has been seen and evaluated by me, Ninfa Hernández MD on 9/20/17 .  Discussed with the fellow or resident and agree with the findings and plan in this note.     I have reviewed  Medications, Vital Signs and Labs.    Ninfa Hernández MD  [facility]  Department of Medicine  Division of Renal Disease and Hypertension  [phone]       I have attempted to contact this patient by phone, but line is busy.  I will continue to try later.

## 2017-09-20 NOTE — NURSING NOTE
Chief Complaint   Patient presents with     Consult For     Referral for EJ.       Initial /84  Pulse 88  Ht 1.829 m (6')  Wt 88.1 kg (194 lb 3.2 oz)  SpO2 98%  BMI 26.34 kg/m2 Estimated body mass index is 26.34 kg/(m^2) as calculated from the following:    Height as of this encounter: 1.829 m (6').    Weight as of this encounter: 88.1 kg (194 lb 3.2 oz).  Medication Reconciliation: complete   Lauren Dillon., CMA

## 2017-09-20 NOTE — TELEPHONE ENCOUNTER
Instructed pt to increase tacrolimus to 4 mg every 12 hours. Pt has IR procedure tomorrow and would like to get labs done then instead of on Friday. Instructed pt to take his increased dose tonight 12 hours before he plans to have labs drawn and we will watch for the results before determining future lab frequency. Pt verbalized understanding and has no questions.

## 2017-09-20 NOTE — PROGRESS NOTES
I have attempted to contact this patient by phone, but line is busy.  I will continue to try later.

## 2017-09-20 NOTE — TELEPHONE ENCOUNTER
Please call Camacho about his request for NS flush syringes for abdominal drains.   He is out, or nearly out, and comes in today to clinic to be seen by renal and have a CT scan.

## 2017-09-20 NOTE — PROGRESS NOTES
Assessment and Plan:   1. EJ: stage 3 CKD now GFR 59, multiple EJ since liver transplant his prior baseline Cr was 0.6 to 0.7, his EJ is most likely multifactorial related to sepsis, contrast study and prograf, peaked at 3.2, and remain in the 2 range for long time, he is improving but his baseline changed to 0.9 to 1.2 now is 1.3, he did have some proteinuria and diabetes and hypertension , he is at risk of progression to advance kidney disease in the future, will advise to avoid nephrotoxic and renally dose his medication, try to limit contrast study as possible.   2. Hyperkalemia resolved now most likely related to prograf causing hypoaldosteronism which is resolved now, cont florenif 0.1 daily.    3. Proteinuria 1.3,  albumin, this could be to diabetes, improving from 1.5 in June, would consider workup paraproteinemia spep and light chain ratio.   4.  mg is low normal need replacement, bicarb no issue, calcium is normal if corrected with albumin, phos normal.   5. Hgb result came back after he left the clinic I called him and direct him to get it rechecked and to go to ED. He was seen by other physician and sent to the transfusion center.     Assessment and plan was discussed with patient and he voiced his understanding and agreement.    Patient seen and discussed  with Dr. Hernández.  Tl Higginbotham  Nephrology Fellow  Pager: [phone]      Consult:  Camacho Bhagat was seen in consultation at the request of Dr. KENNEY for elevated creatinine and hospital follow up.    Reason for Visit:  Camacho Bhagat is a 51 year old male with Diabetes and recent liver transplant, who presents for EJ and hyperkalemia.    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 During his hospital stay he was started in florenif and his K level improved. Since discharge he is feeling fine and denies any new complaint, still having drain to his abdomen and colostomy bag. No fever no nausea or vomiting, no abd pain, no urinary symptoms, no edema, no other complaint.           Kidney Disease and Medical Hx:       h/o HTN: Yes   Usual BP: Not checked       h/o DM: Yes        h/o Protein in Urine: Yes        h/o Blood in Urine: No       h/o Kidney Stones:No       h/o UTI  No       h/o Chronic NSAID Use: No         Previous Transplant Hx:      Yes; liver 3/4/2017       Uremic Symptoms:           ROS:   A comprehensive review of systems was obtained and negative, except as noted in the HPI or PMH.    Active Medical Problems:  Patient Active Problem List   Diagnosis     Carpal tunnel syndrome     Essential hypertension     Personal history of thrombophlebitis     Esophageal reflux     Depressive disorder, not elsewhere classified     Hyperlipidemia     Sleep apnea     Testicular hypofunction     HYPERLIPIDEMIA LDL GOAL <100     Fibromyalgia     OA (osteoarthritis)     Sicca syndrome (H)     Knee pain     Primary localized osteoarthrosis, lower leg     Diabetes mellitus with neurological manifestation (H)     Medication refill- do not delete      Pain medication agreement signed - Shay Kirkpatrick 2/7/14     Hepatocellular carcinoma (H)     Uncontrolled type 2 diabetes mellitus with hyperglycemia, with long-term current use of insulin (H)     Osteoarthritis     Rheumatoid arthritis (H)     Hyperkalemia     Liver transplant recipient (H)     Immunosuppressed status (H)     Neck pain     Back pain     Sepsis (H)     Typhlitis     Neutropenic colitis (H)     S/P liver transplant (H)     Retroperitoneal abscess (H)     Peritonitis  and retroperitoneal infections (H)     Delirium, acute     Pancytopenia (H)     EJ (acute kidney injury) (H)     Hyperkalemic renal tubular acidosis     Inadequate oral intake     Gangrene of finger (H)     Ischemia of both lower extremities     PMH:   Medical record was reviewed and PMH was discussed with patient and noted below.  Past Medical History:   Diagnosis Date     Cancer (H)      Depressive disorder, not elsewhere classified      Esophageal reflux      Fibromyalgia 1/2009    dx with Dr Benitez( [facility])     History of thrombophlebitis      Osteoarthritis      Other acute embolism veins 11/01    Deep vein thrombophlebitis, filter placed     Other and unspecified hyperlipidemia      Other chronic nonalcoholic liver disease     Fatty liver      Other testicular hypofunction      Pneumonia, organism 10-01    Included ARDS, sepsis, and  acute renal failure; hospitalized     Rheumatoid arthritis(714.0)      Type II or unspecified type diabetes mellitus without mention of complication, not stated as uncontrolled 11/01    Managed by endocrinology     Unspecified essential hypertension 11/01    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     Unspecified sleep apnea     Uses BiPAP     PSH:   Past Surgical History:   Procedure Laterality Date     BENCH LIVER N/A 3/4/2017    Procedure: BENCH LIVER;  Surgeon: Jovan Tran MD;  Location: [facility]     C NONSPECIFIC PROCEDURE      tracheostomy     C NONSPECIFIC PROCEDURE      repair of deviated septum     C NONSPECIFIC PROCEDURE  2007    Rt knee arthroscopy     C TOTAL KNEE ARTHROPLASTY  2008    Right knee arthroscopy     CHOLECYSTECTOMY       COLONOSCOPY N/A 7/21/2017    Procedure: COMBINED COLONOSCOPY, SINGLE OR MULTIPLE BIOPSY/POLYPECTOMY BY BIOPSY;  Colonoscopy;  Surgeon: Izaiah Montes MD;  Location: [facility]     ESOPHAGOSCOPY, GASTROSCOPY, DUODENOSCOPY (EGD), COMBINED N/A 8/4/2016    Procedure: COMBINED ESOPHAGOSCOPY, GASTROSCOPY, DUODENOSCOPY (EGD), BIOPSY  SINGLE OR MULTIPLE;  Surgeon: Trent Pederson MD;  Location: [facility]     ESOPHAGOSCOPY, GASTROSCOPY, DUODENOSCOPY (EGD), COMBINED N/A 2017    Procedure: COMBINED ESOPHAGOSCOPY, GASTROSCOPY, DUODENOSCOPY (EGD);;  Surgeon: Los Wynn MD;  Location: [facility]     LAPAROTOMY EXPLORATORY N/A 2017    Procedure: LAPAROTOMY EXPLORATORY;  Exploratory Laparotomy, washout;  Surgeon: Tip Zhang MD;  Location: [facility]     LAPAROTOMY EXPLORATORY N/A 2017    Procedure: LAPAROTOMY EXPLORATORY;  Exploratory Laparotomy, Washout with closure.;  Surgeon: Tip Zhang MD;  Location: [facility]     LAPAROTOMY EXPLORATORY N/A 2017    Procedure: LAPAROTOMY EXPLORATORY;  Exploratory Laparotomy, Right angelique-colectomy, end ileostomy, mucosal fistula, partial omentectomy;  Surgeon: Sara Dinh MD;  Location: [facility]     TRANSPLANT LIVER RECIPIENT  DONOR N/A 3/4/2017    Procedure: TRANSPLANT LIVER RECIPIENT  DONOR;  Surgeon: Jovan Tran MD;  Location: [facility]       Family Hx:   Family History   Problem Relation Age of Onset     DIABETES Father      Hypertension Father      Substance Abuse Father      Arthritis Mother      CANCER Mother      Thyroid     Thyroid Disease Mother      Other Cancer Mother      Colon Cancer No family hx of      Hyperlipidemia No family hx of      Coronary Artery Disease No family hx of      CEREBROVASCULAR DISEASE No family hx of      Breast Cancer No family hx of      Prostate Cancer No family hx of      Personal Hx:   Social History     Social History     Marital status:      Spouse name: N/A     Number of children: 1     Years of education: N/A     Occupational History            Social History Main Topics     Smoking status: Former Smoker     Smokeless tobacco: Former User     Types: Chew     Quit date: 10/8/2015      Comment: Has used chewing tobacco since age 15 , chewed for 20yrs      Alcohol use No      Comment:  last drink about a year ago (quit 2001)     Drug use: No     Sexual activity: Yes     Partners: Female     Other Topics Concern     Not on file     Social History Narrative    uSED TO BE      Back in school now           Allergies:  Allergies   Allergen Reactions     Erythromycin GI Disturbance     Vioxx      Nausea, vomiting       Medications:  Prior to Admission medications    Medication Sig Start Date End Date Taking? Authorizing Provider   sodium chloride, PF, 0.9% PF flush Irrigate with 20 mLs as directed 2 times daily *10 mLs per drain 9/20/17  Yes Jovan Tran MD   Sodium Chloride Flush (SALINE FLUSH IV)    Yes Reported, Patient   tacrolimus (GENERIC EQUIVALENT) 1 MG capsule Take 2 capsules (2 mg) by mouth 2 times daily *total dose 2.5 mg BID 9/15/17  Yes oJvan Tran MD   tacrolimus (GENERIC EQUIVALENT) 0.5 MG capsule Take 1 capsule (0.5 mg) by mouth 2 times daily *total dose 2.5 mg BID 9/15/17  Yes Jovan Tran MD   oxyCODONE (ROXICODONE) 10 MG IR tablet Take 0.5 tablets (5 mg) by mouth every 4 hours as needed for moderate to severe pain 9/11/17  Yes Jovan Tran MD   acetaminophen (TYLENOL) 325 MG tablet Take 2 tablets (650 mg) by mouth every 4 hours as needed for mild pain 9/8/17  Yes Felicia Tolliver APRN CNP   loperamide (IMODIUM) 2 MG capsule Take 2 capsules (4 mg) by mouth 2 times daily 9/8/17  Yes Felicia Tolliver APRN CNP   amLODIPine (NORVASC) 5 MG tablet Take 2 tablets (10 mg) by mouth daily 9/8/17  Yes Felicia Tolliver APRN CNP   fludrocortisone (FLORINEF) 0.1 MG tablet Take 1 tablet (0.1 mg) by mouth daily For elevated potassium 9/8/17  Yes Felicia Tolliver APRN CNP   miconazole with skin protectant (LEXI ANTIFUNGAL) 2 % CREA cream Apply topically 2 times daily 9/8/17  Yes Felicia Tolliver APRN CNP   furosemide (LASIX) 20 MG tablet Take 1 tablet (20 mg) by mouth every other day 9/9/17  Yes Felicia Tolliver, LAMIN CNP    cyclobenzaprine (FLEXERIL) 10 MG tablet Take 1 tablet (10 mg) by mouth 3 times daily as needed for muscle spasms 9/8/17  Yes Felicia Tolliver APRN CNP   pantoprazole (PROTONIX) 40 MG EC tablet Take 1 tablet (40 mg) by mouth daily 9/8/17  Yes Felicia Tolliver APRN CNP   sodium bicarbonate 650 MG tablet Take 1 tablet (650 mg) by mouth 3 times daily 9/8/17  Yes Felicia Tolliver APRN CNP   insulin glargine (LANTUS) 100 UNIT/ML injection Inject 5 Units Subcutaneous At Bedtime 9/8/17  Yes Felicia Tolliver APRN CNP   insulin aspart (NOVOLOG FLEXPEN) 100 UNIT/ML injection Inject 1-10 Units Subcutaneous 3 times daily (with meals) Pre-Meal  - 164 give 1 unit.    - 189 give 2 units.    - 214 give 3 units.    - 239 give 4 units.    - 264 give 5 units.    - 289 give 6 units.    - 314 give 7 units.    - 339 give 8 units.    - 364 give 9 units.  BG greater than or equal to 365 give 10 units 9/8/17  Yes Felicia Tolliver APRN CNP   insulin aspart (NOVOLOG FLEXPEN) 100 UNIT/ML injection Inject 1-7 Units Subcutaneous At Bedtime  - 224 give 1 units.    - 249 give 2 units.    - 274 give 3 units.    - 299 give 4 units.    - 324 give 5 units.    - 349 give 6 units.   BG greater than or equal to 350 give 7 units. 9/8/17  Yes Felicia Tolliver APRN CNP   insulin aspart (NOVOLOG PEN) 100 UNIT/ML injection DOSE:  1 units per 10 grams of carbohydrate. With meals and snacks. Only chart total amount of units given.  Do not give if pre-prandial glucose is less than 60 mg/dL. 9/8/17  Yes Felicia Tolliver APRN CNP   prochlorperazine (COMPAZINE) 5 MG tablet Take 1-2 tablets (5-10 mg) by mouth every 6 hours as needed for nausea or vomiting 3/13/17  Yes Dora Elizabeth, NP   ondansetron (ZOFRAN-ODT) 4 MG ODT tab Take 1 tablet (4 mg) by mouth every 8 hours as needed for nausea 3/10/17  Yes Ada Driver PA-C   tadalafil (CIALIS) 5 MG tablet Take 1  tablet (5 mg) by mouth daily Never use with nitroglycerin, terazosin or doxazosin.  Patient not taking: Reported on 9/20/2017 5/4/17   Toñito Rivas MD     Vitals:  /84  Pulse 88  Ht 1.829 m (6')  Wt 88.1 kg (194 lb 3.2 oz)  SpO2 98%  BMI 26.34 kg/m2    Exam:  GENERAL APPEARANCE: alert and no distress  HENT: mouth without ulcers or lesions  LYMPHATICS: no cervical or supraclavicular nodes  RESP: lungs clear to auscultation - no rales, rhonchi or wheezes  CV: regular rhythm, normal rate, no rub, no murmur  EDEMA: no LE edema bilaterally  ABDOMEN: soft, nondistended, nontender, bowel sounds normal,   MS: extremities normal - no gross deformities noted, no evidence of inflammation in joints, no muscle tenderness  SKIN: no rash      Results:  Recent Results (from the past 336 hour(s))   Glucose by meter    Collection Time: 09/06/17  5:09 PM   Result Value Ref Range    Glucose 132 (H) 70 - 99 mg/dL   Glucose by meter    Collection Time: 09/06/17  9:26 PM   Result Value Ref Range    Glucose 128 (H) 70 - 99 mg/dL   Glucose by meter    Collection Time: 09/07/17  2:12 AM   Result Value Ref Range    Glucose 118 (H) 70 - 99 mg/dL   Magnesium    Collection Time: 09/07/17  8:05 AM   Result Value Ref Range    Magnesium 2.0 1.6 - 2.3 mg/dL   Phosphorus    Collection Time: 09/07/17  8:05 AM   Result Value Ref Range    Phosphorus 4.8 (H) 2.5 - 4.5 mg/dL   Basic metabolic panel    Collection Time: 09/07/17  8:05 AM   Result Value Ref Range    Sodium 132 (L) 133 - 144 mmol/L    Potassium 4.8 3.4 - 5.3 mmol/L    Chloride 104 94 - 109 mmol/L    Carbon Dioxide 23 20 - 32 mmol/L    Anion Gap 6 3 - 14 mmol/L    Glucose 200 (H) 70 - 99 mg/dL    Urea Nitrogen 26 7 - 30 mg/dL    Creatinine 1.33 (H) 0.66 - 1.25 mg/dL    GFR Estimate 56 (L) >60 mL/min/1.7m2    GFR Estimate If Black 68 >60 mL/min/1.7m2    Calcium 7.7 (L) 8.5 - 10.1 mg/dL   Hepatic panel    Collection Time: 09/07/17  8:05 AM   Result Value Ref Range     Bilirubin Direct 0.2 0.0 - 0.2 mg/dL    Bilirubin Total 0.4 0.2 - 1.3 mg/dL    Albumin 1.8 (L) 3.4 - 5.0 g/dL    Protein Total 6.5 (L) 6.8 - 8.8 g/dL    Alkaline Phosphatase 196 (H) 40 - 150 U/L    ALT 12 0 - 70 U/L    AST 26 0 - 45 U/L   CBC with platelets    Collection Time: 09/07/17  8:05 AM   Result Value Ref Range    WBC 4.0 4.0 - 11.0 10e9/L    RBC Count 1.84 (L) 4.4 - 5.9 10e12/L    Hemoglobin 5.5 (LL) 13.3 - 17.7 g/dL    Hematocrit 16.8 (L) 40.0 - 53.0 %    MCV 91 78 - 100 fl    MCH 29.9 26.5 - 33.0 pg    MCHC 32.7 31.5 - 36.5 g/dL    RDW 20.3 (H) 10.0 - 15.0 %    Platelet Count 71 (L) 150 - 450 10e9/L   CMV DNA quantification    Collection Time: 09/07/17  8:05 AM   Result Value Ref Range    CMV DNA Quantitation Specimen EDTA PLASMA     CMV Quant IU/mL CMV DNA Not Detected CMVND^CMV DNA Not Detected [IU]/mL    Log IU/mL of CMVQNT Not Calculated <2.1 [Log_IU]/mL   ABO/Rh type and screen    Collection Time: 09/07/17  8:05 AM   Result Value Ref Range    Units Ordered 2     ABO O     RH(D) Pos     Antibody Screen Neg     Test Valid Only At          [facility]    Specimen Expires 09/10/2017     Crossmatch Red Blood Cells    Blood component    Collection Time: 09/07/17  8:05 AM   Result Value Ref Range    Unit Number [id number]     Blood Component Type Red Blood Cells Leukocyte Reduced     Division Number 00     Status of Unit Released to care unit     Blood Product Code E7981E92     Unit Status ISS    Blood component    Collection Time: 09/07/17  8:05 AM   Result Value Ref Range    Unit Number [id number]     Blood Component Type Red Blood Cells Leukocyte Reduced     Division Number 00     Status of Unit Released to care unit     Blood Product Code L9157T61     Unit Status ISS    Glucose by meter    Collection Time: 09/07/17  9:25 AM   Result Value Ref Range    Glucose 169 (H) 70 - 99 mg/dL   Hemoglobin    Collection Time: 09/07/17  9:44 AM   Result Value Ref  Range    Hemoglobin 6.8 (LL) 13.3 - 17.7 g/dL   Glucose by meter    Collection Time: 09/07/17 12:40 PM   Result Value Ref Range    Glucose 182 (H) 70 - 99 mg/dL   Hemoglobin    Collection Time: 09/07/17  4:10 PM   Result Value Ref Range    Hemoglobin 7.9 (L) 13.3 - 17.7 g/dL   Iron and iron binding capacity    Collection Time: 09/07/17  4:10 PM   Result Value Ref Range    Iron 28 (L) 35 - 180 ug/dL    Iron Binding Cap 138 (L) 240 - 430 ug/dL    Iron Saturation Index 20 15 - 46 %   Haptoglobin    Collection Time: 09/07/17  4:10 PM   Result Value Ref Range    Haptoglobin 152 15 - 200 mg/dL   Reticulocyte count    Collection Time: 09/07/17  4:10 PM   Result Value Ref Range    % Retic 3.8 (H) 0.5 - 2.0 %    Absolute Retic 96.3 (H) 25 - 95 10e9/L   Lactate Dehydrogenase    Collection Time: 09/07/17  4:10 PM   Result Value Ref Range    Lactate Dehydrogenase 393 (H) 85 - 227 U/L   Blood Morphology Pathologist Review    Collection Time: 09/07/17  4:10 PM   Result Value Ref Range    Copath Report       Patient Name: RONNIE ARIZMENDI  MR#: [id number]  Specimen #: [id number]  Collected: 9/7/2017  Received: 9/8/2017  Reported: 9/8/2017 10:05  Ordering Phy(s): MIKI CANO    For improved result formatting, select 'View Enhanced Report Format'  under Linked Documents section.    TEST(S):  Blood Smear Morphology    FINAL DIAGNOSIS:  Peripheral blood smear:       Marked normochromic normocytic anemia with a slight increase in  erythrocyte regeneration       Slight leukopenia due to lymphocytopenia       Marked thrombocytopenia with overall normal platelet morphology    COMMENT:  The overall findings are insufficient for a morphologic diagnosis of  hemolysis.   If clinical suspicion is high for hemolysis, serial  determinations of D-dimer, fibrinogen, LDH, and haptoglobin may be  helpful.    I have personally reviewed all specimens and/or slides, including the  listed special stains, and used them with my medical judgment  to  determine the final diagnosis.    Electronically signed out  by:    Momo Harkins M.D.,[facility]    Technical testing/processing performed at [place], [place]    CLINICAL HISTORY:  51 year old man with history of CASTAÑEDA cirrhosis, status post liver  transplant, now with pancytopenia    MICROSCOPIC DESCRIPTION:  The red cells appear normochromic.  Poikilocytosis is minimal.  Polychromasia is slightly increased.  Rouleaux formation is not  increased.  The morphology of the platelets is normal.    CLINICAL LAB RESULTS:  Battery Order No. Lab Test Code Clinical Result Ref. Range Units Result  Date  Hemogram/Diff/PLT U11350 BU WBC Count L 3.8 4.0-11.0 10e9/L 9/7/2017  17:10       RBC Count L 2.54 4.4-5.9 10e12/L 9/7/2017 17:10       Hemoglobin L 7.9 13.3-17.7 g/dL 9/7/2017 17:10       Hematocrit L 23.1 40.0-53.0 % 9/7/2017 17:10       MCV 91  fl 9/7/2017 17:10       MCH 31.1 26.5-33.0 pg 9/7/2017 17:10       MCHC 34.2 31.5-36.5 g/dL 9/7/2017 17:10       RDW H 19.4 10.0-15.0 % 9/7/20 17 17:10       Platelet Count L 74 150-450 10e9/L 9/7/2017 17:10        SEE TEXT   9/7/2017 17:10       Text/Comments:  Automated Method       % Neutrophils 67.7  % 9/7/2017 17:10       % Lymphocytes 14.4  % 9/7/2017 17:10       % Monocytes 16.0  % 9/7/2017 17:10       % Eosinophils 0.8  % 9/7/2017 17:10       % Basophils 0.3  % 9/7/2017 17:10       % Immature Grans 0.8  % 9/7/2017 17:10       Nucleated RBCs 0 0 /100 9/7/2017 17:10       abs Neutrophils 2.5 1.6-8.3 10e9/L 9/7/2017 17:10       abs Lymphocytes L 0.5 0.8-5.3 10e9/L 9/7/2017 17:10       abs Monocytes 0.6 0.0-1.3 10e9/L 9/7/2017 17:10       abs Eosinophils 0.0 0.0-0.7 10e9/L 9/7/2017 17:10       abs Basophils 0.0 0.0-0.2 10e9/L 9/7/2017 17:10       abs Imm Granulocytes 0.0 0-0.4 10e9/L 9/7/2017 17:10       abs NRBC 0.0   9/7/2017 17:10    CPT Codes:  A: 47274-LIFAX    TESTING LAB LOCATION:  [facility]  [facility], [facility] [address] [place]   [address] [phone]     COLLECTION SITE:  Client[facility]  [facility]  [facility]     CBC with platelets differential    Collection Time: 09/07/17  4:10 PM   Result Value Ref Range    WBC 3.8 (L) 4.0 - 11.0 10e9/L    RBC Count 2.54 (L) 4.4 - 5.9 10e12/L    Hemoglobin 7.9 (L) 13.3 - 17.7 g/dL    Hematocrit 23.1 (L) 40.0 - 53.0 %    MCV 91 78 - 100 fl    MCH 31.1 26.5 - 33.0 pg    MCHC 34.2 31.5 - 36.5 g/dL    RDW 19.4 (H) 10.0 - 15.0 %    Platelet Count 74 (L) 150 - 450 10e9/L    Diff Method Automated Method     % Neutrophils 67.7 %    % Lymphocytes 14.4 %    % Monocytes 16.0 %    % Eosinophils 0.8 %    % Basophils 0.3 %    % Immature Granulocytes 0.8 %    Nucleated RBCs 0 0 /100    Absolute Neutrophil 2.5 1.6 - 8.3 10e9/L    Absolute Lymphocytes 0.5 (L) 0.8 - 5.3 10e9/L    Absolute Monocytes 0.6 0.0 - 1.3 10e9/L    Absolute Eosinophils 0.0 0.0 - 0.7 10e9/L    Absolute Basophils 0.0 0.0 - 0.2 10e9/L    Abs Immature Granulocytes 0.0 0 - 0.4 10e9/L    Absolute Nucleated RBC 0.0    Glucose by meter    Collection Time: 09/07/17  5:15 PM   Result Value Ref Range    Glucose 206 (H) 70 - 99 mg/dL   Glucose by meter    Collection Time: 09/07/17  9:58 PM   Result Value Ref Range    Glucose 214 (H) 70 - 99 mg/dL   Glucose by meter    Collection Time: 09/08/17  1:27 AM   Result Value Ref Range    Glucose 200 (H) 70 - 99 mg/dL   Magnesium    Collection Time: 09/08/17  6:57 AM   Result Value Ref Range    Magnesium 2.0 1.6 - 2.3 mg/dL   Phosphorus    Collection Time: 09/08/17  6:57 AM   Result Value Ref Range    Phosphorus 3.7 2.5 - 4.5 mg/dL   Basic metabolic panel    Collection Time: 09/08/17  6:57 AM   Result Value Ref Range    Sodium 134 133 - 144 mmol/L    Potassium 5.3 3.4 - 5.3 mmol/L    Chloride 104 94 - 109 mmol/L    Carbon Dioxide 24 20 - 32 mmol/L    Anion Gap 6 3 - 14 mmol/L     Glucose 111 (H) 70 - 99 mg/dL    Urea Nitrogen 27 7 - 30 mg/dL    Creatinine 1.21 0.66 - 1.25 mg/dL    GFR Estimate 63 >60 mL/min/1.7m2    GFR Estimate If Black 76 >60 mL/min/1.7m2    Calcium 8.3 (L) 8.5 - 10.1 mg/dL   Tacrolimus level    Collection Time: 09/08/17  6:57 AM   Result Value Ref Range    Tacrolimus Last Dose Not Provided     Tacrolimus Level 3.9 (L) 5.0 - 15.0 ug/L   CBC with platelets    Collection Time: 09/08/17  6:57 AM   Result Value Ref Range    WBC 3.7 (L) 4.0 - 11.0 10e9/L    RBC Count 2.72 (L) 4.4 - 5.9 10e12/L    Hemoglobin 8.4 (L) 13.3 - 17.7 g/dL    Hematocrit 24.7 (L) 40.0 - 53.0 %    MCV 91 78 - 100 fl    MCH 30.9 26.5 - 33.0 pg    MCHC 34.0 31.5 - 36.5 g/dL    RDW 18.9 (H) 10.0 - 15.0 %    Platelet Count 74 (L) 150 - 450 10e9/L   Glucose by meter    Collection Time: 09/08/17 11:58 AM   Result Value Ref Range    Glucose 176 (H) 70 - 99 mg/dL   Tacrolimus level    Collection Time: 09/11/17 10:50 AM   Result Value Ref Range    Tacrolimus Last Dose 10PM 09.10.2017     Tacrolimus Level <3.0 (L) 5.0 - 15.0 ug/L   Phosphorus    Collection Time: 09/11/17 10:50 AM   Result Value Ref Range    Phosphorus 4.5 2.5 - 4.5 mg/dL   Magnesium    Collection Time: 09/11/17 10:50 AM   Result Value Ref Range    Magnesium 1.6 1.6 - 2.3 mg/dL   Hepatic panel    Collection Time: 09/11/17 10:50 AM   Result Value Ref Range    Bilirubin Direct 0.3 (H) 0.0 - 0.2 mg/dL    Bilirubin Total 0.6 0.2 - 1.3 mg/dL    Albumin 2.3 (L) 3.4 - 5.0 g/dL    Protein Total 7.6 6.8 - 8.8 g/dL    Alkaline Phosphatase 206 (H) 40 - 150 U/L    ALT 22 0 - 70 U/L    AST 48 (H) 0 - 45 U/L   CBC with platelets    Collection Time: 09/11/17 10:50 AM   Result Value Ref Range    WBC 3.6 (L) 4.0 - 11.0 10e9/L    RBC Count 2.91 (L) 4.4 - 5.9 10e12/L    Hemoglobin 8.7 (L) 13.3 - 17.7 g/dL    Hematocrit 26.9 (L) 40.0 - 53.0 %    MCV 92 78 - 100 fl    MCH 29.9 26.5 - 33.0 pg    MCHC 32.3 31.5 - 36.5 g/dL    RDW 18.4 (H) 10.0 - 15.0 %    Platelet Count  91 (L) 150 - 450 10e9/L   Basic metabolic panel    Collection Time: 09/11/17 10:50 AM   Result Value Ref Range    Sodium 136 133 - 144 mmol/L    Potassium 5.6 (H) 3.4 - 5.3 mmol/L    Chloride 105 94 - 109 mmol/L    Carbon Dioxide 23 20 - 32 mmol/L    Anion Gap 8 3 - 14 mmol/L    Glucose 244 (H) 70 - 99 mg/dL    Urea Nitrogen 30 7 - 30 mg/dL    Creatinine 1.38 (H) 0.66 - 1.25 mg/dL    GFR Estimate 54 (L) >60 mL/min/1.7m2    GFR Estimate If Black 65 >60 mL/min/1.7m2    Calcium 8.4 (L) 8.5 - 10.1 mg/dL   WBC Differential    Collection Time: 09/11/17 10:50 AM   Result Value Ref Range    Diff Method Manual Differential     % Neutrophils 66.3 %    % Lymphocytes 23.9 %    % Monocytes 6.2 %    % Eosinophils 2.7 %    % Basophils 0.9 %    Absolute Neutrophil 2.4 1.6 - 8.3 10e9/L    Absolute Lymphocytes 0.9 0.8 - 5.3 10e9/L    Absolute Monocytes 0.2 0.0 - 1.3 10e9/L    Absolute Eosinophils 0.1 0.0 - 0.7 10e9/L    Absolute Basophils 0.0 0.0 - 0.2 10e9/L    Anisocytosis Moderate    Tacrolimus level    Collection Time: 09/14/17 11:33 AM   Result Value Ref Range    Tacrolimus Last Dose 09/13/17 @ 23:00     Tacrolimus Level <3.0 (L) 5.0 - 15.0 ug/L   Magnesium    Collection Time: 09/14/17 11:33 AM   Result Value Ref Range    Magnesium 1.6 1.6 - 2.3 mg/dL   CBC with platelets    Collection Time: 09/14/17 11:33 AM   Result Value Ref Range    WBC 4.2 4.0 - 11.0 10e9/L    RBC Count 2.77 (L) 4.4 - 5.9 10e12/L    Hemoglobin 8.5 (L) 13.3 - 17.7 g/dL    Hematocrit 25.6 (L) 40.0 - 53.0 %    MCV 92 78 - 100 fl    MCH 30.7 26.5 - 33.0 pg    MCHC 33.2 31.5 - 36.5 g/dL    RDW 17.9 (H) 10.0 - 15.0 %    Platelet Count 103 (L) 150 - 450 10e9/L   WBC Differential    Collection Time: 09/14/17 11:33 AM   Result Value Ref Range    Diff Method Automated Method     % Neutrophils 50.3 %    % Lymphocytes 32.1 %    % Monocytes 9.5 %    % Eosinophils 2.4 %    % Basophils 0.7 %    % Immature Granulocytes 5.0 %    Nucleated RBCs 0 0 /100    Absolute  Neutrophil 2.1 1.6 - 8.3 10e9/L    Absolute Lymphocytes 1.4 0.8 - 5.3 10e9/L    Absolute Monocytes 0.4 0.0 - 1.3 10e9/L    Absolute Eosinophils 0.1 0.0 - 0.7 10e9/L    Absolute Basophils 0.0 0.0 - 0.2 10e9/L    Abs Immature Granulocytes 0.2 0 - 0.4 10e9/L    Absolute Nucleated RBC 0.0    Renal panel    Collection Time: 09/14/17 11:33 AM   Result Value Ref Range    Sodium 135 133 - 144 mmol/L    Potassium 5.0 3.4 - 5.3 mmol/L    Chloride 105 94 - 109 mmol/L    Carbon Dioxide 22 20 - 32 mmol/L    Anion Gap 7 3 - 14 mmol/L    Glucose 209 (H) 70 - 99 mg/dL    Urea Nitrogen 28 7 - 30 mg/dL    Creatinine 1.27 (H) 0.66 - 1.25 mg/dL    GFR Estimate 59 (L) >60 mL/min/1.7m2    GFR Estimate If Black 72 >60 mL/min/1.7m2    Calcium 8.4 (L) 8.5 - 10.1 mg/dL    Phosphorus 3.4 2.5 - 4.5 mg/dL    Albumin 2.4 (L) 3.4 - 5.0 g/dL   Vitamin D Deficiency    Collection Time: 09/14/17 11:33 AM   Result Value Ref Range    Vitamin D Deficiency screening 20 20 - 75 ug/L   Parathyroid Hormone Intact    Collection Time: 09/14/17 11:33 AM   Result Value Ref Range    Parathyroid Hormone Intact 34 12 - 72 pg/mL   IRON AND IRON BINDING CAPACITY    Collection Time: 09/14/17 11:33 AM   Result Value Ref Range    Iron 39 35 - 180 ug/dL    Iron Binding Cap 182 (L) 240 - 430 ug/dL    Iron Saturation Index 22 15 - 46 %   FERRITIN    Collection Time: 09/14/17 11:33 AM   Result Value Ref Range    Ferritin 2947 (H) 26 - 388 ng/mL   Alkaline phosphatase    Collection Time: 09/14/17 11:33 AM   Result Value Ref Range    Alkaline Phosphatase 221 (H) 40 - 150 U/L   ALT    Collection Time: 09/14/17 11:33 AM   Result Value Ref Range    ALT 26 0 - 70 U/L   AST    Collection Time: 09/14/17 11:33 AM   Result Value Ref Range    AST 55 (H) 0 - 45 U/L   Bilirubin  total    Collection Time: 09/14/17 11:33 AM   Result Value Ref Range    Bilirubin Total 0.8 0.2 - 1.3 mg/dL   Bilirubin direct    Collection Time: 09/14/17 11:33 AM   Result Value Ref Range    Bilirubin Direct  0.2 0.0 - 0.2 mg/dL   Protein total    Collection Time: 09/14/17 11:33 AM   Result Value Ref Range    Protein Total 8.0 6.8 - 8.8 g/dL   Protein  random urine with Creat Ratio    Collection Time: 09/14/17 11:36 AM   Result Value Ref Range    Protein Random Urine 1.18 g/L    Protein Total Urine g/gr Creatinine 1.37 (H) 0 - 0.2 g/g Cr   Routine UA with microscopic    Collection Time: 09/14/17 11:36 AM   Result Value Ref Range    Color Urine Yellow     Appearance Urine Slightly Cloudy     Glucose Urine 50 (A) NEG^Negative mg/dL    Bilirubin Urine Negative NEG^Negative    Ketones Urine Negative NEG^Negative mg/dL    Specific Gravity Urine 1.012 1.003 - 1.035    Blood Urine Negative NEG^Negative    pH Urine 5.0 5.0 - 7.0 pH    Protein Albumin Urine 100 (A) NEG^Negative mg/dL    Urobilinogen mg/dL 0.0 0.0 - 2.0 mg/dL    Nitrite Urine Negative NEG^Negative    Leukocyte Esterase Urine Negative NEG^Negative    Source Midstream Urine     WBC Urine 2 0 - 2 /HPF    RBC Urine 3 (H) 0 - 2 /HPF   Creatinine urine calculation only    Collection Time: 09/14/17 11:36 AM   Result Value Ref Range    Creatinine Urine 86 mg/dL   Tacrolimus level    Collection Time: 09/18/17 11:20 AM   Result Value Ref Range    Tacrolimus Last Dose 09/17 2245     Tacrolimus Level <3.0 (L) 5.0 - 15.0 ug/L   Phosphorus    Collection Time: 09/18/17 11:20 AM   Result Value Ref Range    Phosphorus 3.7 2.5 - 4.5 mg/dL   Magnesium    Collection Time: 09/18/17 11:20 AM   Result Value Ref Range    Magnesium 1.5 (L) 1.6 - 2.3 mg/dL   Hepatic panel    Collection Time: 09/18/17 11:20 AM   Result Value Ref Range    Bilirubin Direct 0.2 0.0 - 0.2 mg/dL    Bilirubin Total 0.6 0.2 - 1.3 mg/dL    Albumin 2.4 (L) 3.4 - 5.0 g/dL    Protein Total 7.7 6.8 - 8.8 g/dL    Alkaline Phosphatase 225 (H) 40 - 150 U/L    ALT 22 0 - 70 U/L    AST 40 0 - 45 U/L   CBC with platelets    Collection Time: 09/18/17 11:20 AM   Result Value Ref Range    WBC 4.7 4.0 - 11.0 10e9/L    RBC Count  2.62 (L) 4.4 - 5.9 10e12/L    Hemoglobin 8.0 (L) 13.3 - 17.7 g/dL    Hematocrit 23.8 (L) 40.0 - 53.0 %    MCV 91 78 - 100 fl    MCH 30.5 26.5 - 33.0 pg    MCHC 33.6 31.5 - 36.5 g/dL    RDW 18.0 (H) 10.0 - 15.0 %    Platelet Count 93 (L) 150 - 450 10e9/L   Basic metabolic panel    Collection Time: 09/18/17 11:20 AM   Result Value Ref Range    Sodium 136 133 - 144 mmol/L    Potassium 4.6 3.4 - 5.3 mmol/L    Chloride 104 94 - 109 mmol/L    Carbon Dioxide 24 20 - 32 mmol/L    Anion Gap 8 3 - 14 mmol/L    Glucose 213 (H) 70 - 99 mg/dL    Urea Nitrogen 22 7 - 30 mg/dL    Creatinine 1.30 (H) 0.66 - 1.25 mg/dL    GFR Estimate 58 (L) >60 mL/min/1.7m2    GFR Estimate If Black 70 >60 mL/min/1.7m2    Calcium 8.1 (L) 8.5 - 10.1 mg/dL   WBC Differential    Collection Time: 09/18/17 11:20 AM   Result Value Ref Range    Diff Method Manual Differential     % Neutrophils 59.1 %    % Lymphocytes 28.7 %    % Monocytes 6.1 %    % Eosinophils 2.6 %    % Basophils 1.7 %    % Metamyelocytes 0.9 %    % Myelocytes 0.9 %    Absolute Neutrophil 2.8 1.6 - 8.3 10e9/L    Absolute Lymphocytes 1.3 0.8 - 5.3 10e9/L    Absolute Monocytes 0.3 0.0 - 1.3 10e9/L    Absolute Eosinophils 0.1 0.0 - 0.7 10e9/L    Absolute Basophils 0.1 0.0 - 0.2 10e9/L    Absolute Metamyelocytes 0.0 0 10e9/L    Absolute Myelocytes 0.0 0 10e9/L    Anisocytosis Slight        Answers for HPI/ROS submitted by the patient on 9/18/2017   General Symptoms: No  Skin Symptoms: No  HENT Symptoms: No  EYE SYMPTOMS: No  HEART SYMPTOMS: No  LUNG SYMPTOMS: No  INTESTINAL SYMPTOMS: No  URINARY SYMPTOMS: No  REPRODUCTIVE SYMPTOMS: No  SKELETAL SYMPTOMS: No  BLOOD SYMPTOMS: No  NERVOUS SYSTEM SYMPTOMS: No  MENTAL HEALTH SYMPTOMS: No      Attestation:  This patient has been seen and evaluated by me, Ninfa Hernández MD on 9/20/17 .  Discussed with the fellow or resident and agree with the findings and plan in this note.     I have reviewed  Medications, Vital Signs and Labs.    Ninfa Grullon  Betty GAR  Physicians  [facility]  Department of Medicine  Division of Renal Disease and Hypertension  [phone]

## 2017-09-20 NOTE — TELEPHONE ENCOUNTER
Please call Camacho about recent tacrolimus level < 3.0, appears that it is a 12 hour level.  Verify his current dose of 2.5mg Q 12 hours, and increase dose to 4mg Q 12 hours.   Ask him to repeat labs on Friday, with 12 hour tacro level.

## 2017-09-20 NOTE — TELEPHONE ENCOUNTER
Camacho calling about new leakage from right sided abd drain, leaks primarily when he lays down.   He has an appt tomorrow,9/21 with IR for a sinogram.  Discussed with Camacho that a call would be put through to IR to request possible sinogram to be done today after pt at [facility] at 3pm  Will call pt back about possible IR sinogram to be done today.

## 2017-09-20 NOTE — MR AVS SNAPSHOT
After Visit Summary   9/20/2017    Camacho Bhagat    MRN: [id number]           Patient Information     Date Of Birth          1965        Visit Information        Provider Department      9/20/2017 3:00 PM Tl Higginbotham MD [facility] Nephrology        Today's Diagnoses     EJ (acute kidney injury) (H)    -  1    Hyperkalemia          Care Instructions    6 month           Follow-ups after your visit        Follow-up notes from your care team     Return in about 6 months (around 3/20/2018).      Your next 10 appointments already scheduled     Oct 13, 2017  2:40 PM CDT   (Arrive by 2:25 PM)   Return Visit with LAMIN Madsen CNP   [facility] ([facility])    [address] [place] [address]  [address] [facility] [id number] [phone]            Oct 16, 2017 11:30 AM CDT   (Arrive by 11:15 AM)   Liver Return Post Op with Jovan Tran MD   [facility] Solid Organ Transplant ([facility])    [address] [place] [address]  [address] [facility] [address]   [phone]            Oct 23, 2017  9:30 AM CDT   (Arrive by 9:15 AM)   Return Visit with Shay Kirkpatrick MD   [facility] ([facility])    [address] [place] [id number]   [phone]            Dec 01, 2017  8:45 AM CST   (Arrive by 8:30 AM)   Return Liver Transplant with Toñito Camejo MD   [facility] Hepatology ([facility])    [address] [place] [address]   [phone]            Dec 13, 2017 10:30 AM CST   (Arrive by 10:15 AM)   Return Visit with Sara Dinh MD   [facility] Colon and Rectal Surgery ([facility])    [address] [place] [id number] [phone]              Who to contact     If you have questions or need follow up information about today's clinic visit or your schedule  please contact [facility] NEPHROLOGY directly at [phone].  Normal or non-critical lab and imaging results will be communicated to you by MyChart, letter or phone within 4 business days after the clinic has received the results. If you do not hear from us within 7 days, please contact the clinic through [facility] or phone. If you have a critical or abnormal lab result, we will notify you by phone as soon as possible.  Submit refill requests through [employer] or call your pharmacy and they will forward the refill request to us. Please allow 3 business days for your refill to be completed.          Additional Information About Your Visit        [facility]onth[employer] Information     [employer] gives you secure access to your electronic health record. If you see a primary care provider, you can also send messages to your care team and make appointments. If you have questions, please call your primary care clinic.  If you do not have a primary care provider, please call [phone] and they will assist you.        Care EveryWhere ID     This is your Care EveryWhere ID. This could be used by other organizations to access your [facility] [place] medical records  [id number]        Your Vitals Were     Pulse Height Pulse Oximetry BMI (Body Mass Index)          88 1.829 m (6') 98% 26.34 kg/m2         Blood Pressure from Last 3 Encounters:   10/11/17 152/79   10/02/17 146/90   09/22/17 155/85    Weight from Last 3 Encounters:   10/11/17 85.2 kg (187 lb 12.8 oz)   10/02/17 87.2 kg (192 lb 3.2 oz)   09/21/17 88 kg (194 lb)              Today, you had the following     No orders found for display         Today's Medication Changes          These changes are accurate as of: 9/20/17 11:59 PM.  If you have any questions, ask your nurse or doctor.               These medicines have changed or have updated prescriptions.        Dose/Directions    tacrolimus 1 MG capsule   Commonly known as:  GENERIC EQUIVALENT   This may have changed:    - how much to take  -  when to take this  - additional instructions  - Another medication with the same name was removed. Continue taking this medication, and follow the directions you see here.   Used for:  Liver transplant recipient (H)   Changed by:  Abril Doty RN        Dose:  4 mg   Take 4 capsules (4 mg) by mouth every 12 hours   Quantity:  240 capsule   Refills:  11            Where to get your medicines      These medications were sent to [email or web address] [address] [place] [address] AT [address]  [address] [facility] [address]     Phone:  [phone]     tacrolimus 1 MG capsule                Primary Care Provider    Shay Kirkpatrick MD       [place] [address]        Equal Access to Services     [facility]: Hadii tavo swartz hadasho Soomaali, waaxda luqadaha, qaybta kaalmada adeegyada, devi craig . So [facility] [phone].    ATENCIÓN: Si habla español, tiene a zheng disposición servicios gratuitos de asistencia lingüística. Llame al [phone].    We comply with applicable federal civil rights laws and [place] laws. We do not discriminate on the basis of race, color, national origin, age, disability, sex, sexual orientation, or gender identity.            Thank you!     Thank you for choosing [facility] NEPHROLOGY  for your care. Our goal is always to provide you with excellent care. Hearing back from our patients is one way we can continue to improve our services. Please take a few minutes to complete the written survey that you may receive in the mail after your visit with us. Thank you!             Your Updated Medication List - Protect others around you: Learn how to safely use, store and throw away your medicines at www.disposemymeds.org.          This list is accurate as of: 9/20/17 11:59 PM.  Always use your most recent med list.                   Brand Name Dispense Instructions for use Diagnosis    acetaminophen 325 MG tablet    TYLENOL    100 tablet     Take 2 tablets (650 mg) by mouth every 4 hours as needed for mild pain    Osteoarthritis, unspecified osteoarthritis type, unspecified site       amLODIPine 5 MG tablet    NORVASC    60 tablet    Take 2 tablets (10 mg) by mouth daily    History of liver transplant (H), Elevated blood pressure reading, Long-term use of immunosuppressant medication       cyclobenzaprine 10 MG tablet    FLEXERIL    90 tablet    Take 1 tablet (10 mg) by mouth 3 times daily as needed for muscle spasms    Immunosuppression (H)       * insulin aspart 100 UNIT/ML injection    NovoLOG FLEXPEN    3 mL    Inject 1-10 Units Subcutaneous 3 times daily (with meals) Pre-Meal  - 164 give 1 unit.   - 189 give 2 units.   - 214 give 3 units.   - 239 give 4 units.   - 264 give 5 units.   - 289 give 6 units.   - 314 give 7 units.   - 339 give 8 units.   - 364 give 9 units. BG greater than or equal to 365 give 10 units    Type 2 diabetes mellitus with diabetic polyneuropathy, unspecified long term insulin use status (H)       * insulin aspart 100 UNIT/ML injection    NovoLOG FLEXPEN    3 mL    Inject 1-7 Units Subcutaneous At Bedtime  - 224 give 1 units.   - 249 give 2 units.   - 274 give 3 units.   - 299 give 4 units.   - 324 give 5 units.   - 349 give 6 units.  BG greater than or equal to 350 give 7 units.    Type 2 diabetes mellitus with diabetic polyneuropathy, unspecified long term insulin use status (H)       insulin glargine 100 UNIT/ML injection    LANTUS    3 mL    Inject 5 Units Subcutaneous At Bedtime    Liver transplant recipient (H)       loperamide 2 MG capsule    IMODIUM    120 capsule    Take 2 capsules (4 mg) by mouth 2 times daily    S/P right hemicolectomy       oxyCODONE 10 MG IR tablet    ROXICODONE    30 tablet    Take 0.5 tablets (5 mg) by mouth every 4 hours as needed for moderate to severe pain    History of liver transplant (H)        pantoprazole 40 MG EC tablet    PROTONIX    30 tablet    Take 1 tablet (40 mg) by mouth daily    S/P liver transplant (H)       SALINE FLUSH IV           sodium bicarbonate 650 MG tablet     90 tablet    Take 1 tablet (650 mg) by mouth 3 times daily    Liver transplant recipient (H)       tacrolimus 1 MG capsule    GENERIC EQUIVALENT    240 capsule    Take 4 capsules (4 mg) by mouth every 12 hours    Liver transplant recipient (H)       tadalafil 5 MG tablet    CIALIS    30 tablet    Take 1 tablet (5 mg) by mouth daily Never use with nitroglycerin, terazosin or doxazosin.    Drug-induced erectile dysfunction       * Notice:  This list has 2 medication(s) that are the same as other medications prescribed for you. Read the directions carefully, and ask your doctor or other care provider to review them with you.

## 2017-09-20 NOTE — TELEPHONE ENCOUNTER
Message left for IR scheduling requesting possible appt today to evaluate abd drain., left pt's phone # to be called back if this is possible.

## 2017-09-21 ENCOUNTER — APPOINTMENT (OUTPATIENT)
Dept: INTERVENTIONAL RADIOLOGY/VASCULAR | Facility: CLINIC | Age: 52
End: 2017-09-21
Attending: NURSE PRACTITIONER
Payer: COMMERCIAL

## 2017-09-21 ENCOUNTER — APPOINTMENT (OUTPATIENT)
Dept: MEDSURG UNIT | Facility: CLINIC | Age: 52
End: 2017-09-21
Attending: INTERNAL MEDICINE
Payer: COMMERCIAL

## 2017-09-21 ENCOUNTER — TELEPHONE (OUTPATIENT)
Dept: TRANSPLANT | Facility: CLINIC | Age: 52
End: 2017-09-21

## 2017-09-21 ENCOUNTER — HOSPITAL ENCOUNTER (OUTPATIENT)
Facility: CLINIC | Age: 52
Discharge: HOME | End: 2017-09-21
Attending: INTERNAL MEDICINE | Admitting: TRANSPLANT SURGERY
Payer: COMMERCIAL

## 2017-09-21 VITALS
TEMPERATURE: 98.8 F | DIASTOLIC BLOOD PRESSURE: 89 MMHG | SYSTOLIC BLOOD PRESSURE: 148 MMHG | OXYGEN SATURATION: 98 % | BODY MASS INDEX: 26.28 KG/M2 | WEIGHT: 194 LBS | HEIGHT: 72 IN | RESPIRATION RATE: 18 BRPM

## 2017-09-21 DIAGNOSIS — Z79.60 LONG-TERM USE OF IMMUNOSUPPRESSANT MEDICATION: ICD-10-CM

## 2017-09-21 DIAGNOSIS — Z94.4 LIVER TRANSPLANT RECIPIENT (H): ICD-10-CM

## 2017-09-21 DIAGNOSIS — Z94.4 HISTORY OF LIVER TRANSPLANT (H): ICD-10-CM

## 2017-09-21 DIAGNOSIS — D64.9 LOW HEMOGLOBIN: Primary | ICD-10-CM

## 2017-09-21 LAB — GLUCOSE BLDC GLUCOMTR-MCNC: 133 MG/DL (ref 70–99)

## 2017-09-21 PROCEDURE — 20501 NJX SINUS TRACT DIAGNOSTIC: CPT | Mod: XS

## 2017-09-21 PROCEDURE — 27210886 ZZH ACCESSORY CR5

## 2017-09-21 PROCEDURE — 25000128 H RX IP 250 OP 636: Performed by: PHYSICIAN ASSISTANT

## 2017-09-21 PROCEDURE — 40000165 ZZH STATISTIC POST-PROCEDURE RECOVERY CARE

## 2017-09-21 PROCEDURE — 20501 NJX SINUS TRACT DIAGNOSTIC: CPT

## 2017-09-21 PROCEDURE — 36430 TRANSFUSION BLD/BLD COMPNT: CPT

## 2017-09-21 PROCEDURE — 82962 GLUCOSE BLOOD TEST: CPT

## 2017-09-21 PROCEDURE — 76080 X-RAY EXAM OF FISTULA: CPT | Mod: XS

## 2017-09-21 RX ORDER — SODIUM CHLORIDE 9 MG/ML
INJECTION, SOLUTION INTRAVENOUS CONTINUOUS
Status: DISCONTINUED | OUTPATIENT
Start: 2017-09-21 | End: 2017-09-21 | Stop reason: HOSPADM

## 2017-09-21 RX ORDER — NALOXONE HYDROCHLORIDE 0.4 MG/ML
.1-.4 INJECTION, SOLUTION INTRAMUSCULAR; INTRAVENOUS; SUBCUTANEOUS
Status: DISCONTINUED | OUTPATIENT
Start: 2017-09-21 | End: 2017-09-21 | Stop reason: HOSPADM

## 2017-09-21 RX ORDER — CEFAZOLIN SODIUM 2 G/100ML
2 INJECTION, SOLUTION INTRAVENOUS
Status: COMPLETED | OUTPATIENT
Start: 2017-09-21 | End: 2017-09-21

## 2017-09-21 RX ORDER — IODIXANOL 320 MG/ML
50 INJECTION, SOLUTION INTRAVASCULAR ONCE
Status: COMPLETED | OUTPATIENT
Start: 2017-09-21 | End: 2017-09-21

## 2017-09-21 RX ORDER — FLUMAZENIL 0.1 MG/ML
0.2 INJECTION, SOLUTION INTRAVENOUS
Status: DISCONTINUED | OUTPATIENT
Start: 2017-09-21 | End: 2017-09-21 | Stop reason: HOSPADM

## 2017-09-21 RX ORDER — TACROLIMUS 1 MG/1
4 CAPSULE ORAL EVERY 12 HOURS
Qty: 240 CAPSULE | Refills: 11 | Status: SHIPPED | OUTPATIENT
Start: 2017-09-21 | End: 2017-09-26

## 2017-09-21 RX ORDER — LIDOCAINE 40 MG/G
CREAM TOPICAL
Status: DISCONTINUED | OUTPATIENT
Start: 2017-09-21 | End: 2017-09-21 | Stop reason: HOSPADM

## 2017-09-21 RX ORDER — FENTANYL CITRATE 50 UG/ML
25-50 INJECTION, SOLUTION INTRAMUSCULAR; INTRAVENOUS EVERY 5 MIN PRN
Status: DISCONTINUED | OUTPATIENT
Start: 2017-09-21 | End: 2017-09-21 | Stop reason: HOSPADM

## 2017-09-21 RX ADMIN — SODIUM CHLORIDE: 9 INJECTION, SOLUTION INTRAVENOUS at 10:55

## 2017-09-21 RX ADMIN — CEFAZOLIN SODIUM 2 G: 2 INJECTION, SOLUTION INTRAVENOUS at 10:55

## 2017-09-21 RX ADMIN — IODIXANOL 30 ML: 320 INJECTION, SOLUTION INTRAVASCULAR at 12:20

## 2017-09-21 NOTE — PROGRESS NOTES
Interventional Radiology Pre-Procedure Sedation Assessment   Time of Assessment: 11:21 AM    Expected Level: Moderate Sedation    Indication: Sedation is required for the following type of Procedure: Drain    Sedation and procedural consent: Risks, benefits and alternatives were discussed with Patient    PO Intake: Appropriately NPO for procedure    ASA Class: Class 3 - SEVERE SYSTEMIC DISEASE, DEFINITE FUNCTIONAL LIMITATIONS.    Mallampati: Grade 3:  Soft palate visible, posterior pharyngeal wall not visible    Lungs: Lungs Clear with good breath sounds bilaterally    Heart: Normal heart sounds and rate    History and physical reviewed and no updates needed. I have reviewed the lab findings, diagnostic data, medications, and the plan for sedation. I have determined this patient to be an appropriate candidate for the planned sedation and procedure and have reassessed the patient IMMEDIATELY PRIOR to sedation and procedure.    Keenan Gold MD

## 2017-09-21 NOTE — PROGRESS NOTES
Interventional Radiology Intra-procedural Nursing Note    Patient Name: Camacho Bhagat  Medical Record Number: [id number]  Today's Date: September 21, 2017    Start Time: 1155  End of procedure time: 1225  Procedure: sinogram with possible drain reposition and possible drain exchange  Staff: Sue ALVARENGA  Report given to:  Alisa DIOR RN  Time pt departs:  1250      Other Notes: pt from 2 a to IR 2. Pt positioned and prepped per procedure protocol.    DELROY RODAS    Assumed pt care @ 1205.  Pt resting comfortably on table.  Sinogram of drains completed.  No intervention necessary.  Pt transferred back to 2A for further care.    Lauren Bloom RN

## 2017-09-21 NOTE — TELEPHONE ENCOUNTER
Hgb 6.4, pt needs to have blood transfusion.   Camacho in IR today for sinogram, per pt hgb reviewed, and decision made that pt did not urgently need a transfusion.     Plan made with Camacho to arrange a blood transfusion for him in the [facility] on Friday, unable to get him an appt at [facility]   Plan for telephone consent for blood transfusion per

## 2017-09-21 NOTE — PROGRESS NOTES
1100  Prep is complete for procedure.  No labs were ordered.  Had labs done yesterday.  H&P is also from 9/20/17.    Consent is being done now.  Camacho is here for Sinogram, Drain in right lower quadrant.  Pt has several drainage tubes in place.  States he has mild to mod pain in right abd.  Glucometer is 133. No Insulin today.  Mom, Humaira, is here in [place].  CTRN

## 2017-09-21 NOTE — IP AVS SNAPSHOT
MRN:[id number]                      After Visit Summary   9/21/2017    Camacho Bhagat    MRN: [id number]           Visit Information        Department      9/21/2017  9:57 AM [address] [facility]          Review of your medicines      UNREVIEWED medicines. Ask your doctor about these medicines        Dose / Directions    acetaminophen 325 MG tablet   Commonly known as:  TYLENOL   Used for:  Osteoarthritis, unspecified osteoarthritis type, unspecified site        Dose:  650 mg   Take 2 tablets (650 mg) by mouth every 4 hours as needed for mild pain   Quantity:  100 tablet   Refills:  3       amLODIPine 5 MG tablet   Commonly known as:  NORVASC   Used for:  History of liver transplant (H), Elevated blood pressure reading, Long-term use of immunosuppressant medication        Dose:  10 mg   Take 2 tablets (10 mg) by mouth daily   Quantity:  60 tablet   Refills:  3       cyclobenzaprine 10 MG tablet   Commonly known as:  FLEXERIL   Used for:  Immunosuppression (H)        Dose:  10 mg   Take 1 tablet (10 mg) by mouth 3 times daily as needed for muscle spasms   Quantity:  90 tablet   Refills:  0       fludrocortisone 0.1 MG tablet   Commonly known as:  FLORINEF   Used for:  History of liver transplant (H), Long-term use of immunosuppressant medication        Dose:  0.1 mg   Take 1 tablet (0.1 mg) by mouth daily For elevated potassium   Quantity:  30 tablet   Refills:  3       furosemide 20 MG tablet   Commonly known as:  LASIX   Used for:  Hyperkalemia        Dose:  20 mg   Take 1 tablet (20 mg) by mouth every other day   Quantity:  30 tablet   Refills:  3       * insulin aspart 100 UNIT/ML injection   Commonly known as:  NovoLOG FLEXPEN   Used for:  Type 2 diabetes mellitus with diabetic polyneuropathy, unspecified long term insulin use status (H)        Dose:  1-10 Units   Inject 1-10 Units Subcutaneous 3 times daily (with meals) Pre-Meal  - 164 give 1 unit.   - 189 give 2 units.   -  214 give 3 units.   - 239 give 4 units.   - 264 give 5 units.   - 289 give 6 units.   - 314 give 7 units.   - 339 give 8 units.   - 364 give 9 units. BG greater than or equal to 365 give 10 units   Quantity:  3 mL   Refills:  3       * insulin aspart 100 UNIT/ML injection   Commonly known as:  NovoLOG FLEXPEN   Used for:  Type 2 diabetes mellitus with diabetic polyneuropathy, unspecified long term insulin use status (H)        Dose:  1-7 Units   Inject 1-7 Units Subcutaneous At Bedtime  - 224 give 1 units.   - 249 give 2 units.   - 274 give 3 units.   - 299 give 4 units.   - 324 give 5 units.   - 349 give 6 units.  BG greater than or equal to 350 give 7 units.   Quantity:  3 mL   Refills:  3       insulin glargine 100 UNIT/ML injection   Commonly known as:  LANTUS   Used for:  Liver transplant recipient (H)        Dose:  5 Units   Inject 5 Units Subcutaneous At Bedtime   Quantity:  3 mL   Refills:  1       loperamide 2 MG capsule   Commonly known as:  IMODIUM   Used for:  S/P right hemicolectomy        Dose:  4 mg   Take 2 capsules (4 mg) by mouth 2 times daily   Quantity:  120 capsule   Refills:  3       miconazole with skin protectant 2 % Crea cream   Used for:  Liver transplant recipient (H)        Apply topically 2 times daily   Quantity:  1 Tube   Refills:  0       ondansetron 4 MG ODT tab   Commonly known as:  ZOFRAN-ODT   Used for:  Cirrhosis of liver with ascites, unspecified hepatic cirrhosis type (H), Nausea        Dose:  4 mg   Take 1 tablet (4 mg) by mouth every 8 hours as needed for nausea   Quantity:  30 tablet   Refills:  0       oxyCODONE 10 MG IR tablet   Commonly known as:  ROXICODONE   Used for:  History of liver transplant (H)        Dose:  5 mg   Take 0.5 tablets (5 mg) by mouth every 4 hours as needed for moderate to severe pain   Quantity:  30 tablet   Refills:  0       pantoprazole 40 MG EC tablet   Commonly known as:   PROTONIX   Used for:  S/P liver transplant (H)        Dose:  40 mg   Take 1 tablet (40 mg) by mouth daily   Quantity:  30 tablet   Refills:  5       prochlorperazine 5 MG tablet   Commonly known as:  COMPAZINE   Used for:  Aftercare following organ transplant, Status post liver transplantation (H)        Dose:  5-10 mg   Take 1-2 tablets (5-10 mg) by mouth every 6 hours as needed for nausea or vomiting   Quantity:  90 tablet   Refills:  0       SALINE FLUSH IV        Refills:  0       sodium bicarbonate 650 MG tablet   Used for:  Liver transplant recipient (H)        Dose:  650 mg   Take 1 tablet (650 mg) by mouth 3 times daily   Quantity:  90 tablet   Refills:  5       sodium chloride (PF) 0.9% PF flush   Used for:  Intra-abdominal abscess (H)        Dose:  20 mL   Irrigate with 20 mLs as directed 2 times daily *10 mLs per drain   Quantity:  280 mL   Refills:  3       tacrolimus 1 MG capsule   Commonly known as:  GENERIC EQUIVALENT   Used for:  Liver transplant recipient (H)        Dose:  4 mg   Take 4 capsules (4 mg) by mouth every 12 hours   Quantity:  240 capsule   Refills:  11       tadalafil 5 MG tablet   Commonly known as:  CIALIS   Used for:  Drug-induced erectile dysfunction        Dose:  5 mg   Take 1 tablet (5 mg) by mouth daily Never use with nitroglycerin, terazosin or doxazosin.   Quantity:  30 tablet   Refills:  11       * Notice:  This list has 2 medication(s) that are the same as other medications prescribed for you. Read the directions carefully, and ask your doctor or other care provider to review them with you.             Protect others around you: Learn how to safely use, store and throw away your medicines at www.disposemymeds.org.         Follow-ups after your visit        Your next 10 appointments already scheduled     Sep 25, 2017  9:00 AM CDT   Lab with  LAB    [facility] ([facility] [place])    [address] [address] [phone]             Oct 02, 2017 11:00 AM CDT   Lab with  LAB   [facility] ([facility])    [address] [facility]  [address] [facility] [address]   [phone]            Oct 02, 2017 11:30 AM CDT   (Arrive by 11:15 AM)   Liver Return Post Op with Jovan Tran MD   [facility] Solid Organ Transplant ([facility])    [address] [facility] [address]  [address] [facility] [address]   [phone]            Oct 09, 2017 11:00 AM CDT   (Arrive by 10:45 AM)   New Patient Visit with Cristopher Willingham MD   [facility] Colon and Rectal Surgery ([facility])    [address] [place] [address]  [address] [facility] [address]   [phone]            Dec 01, 2017  8:45 AM CST   (Arrive by 8:30 AM)   Return Liver Transplant with Toñito Camejo MD   [facility] Hepatology ([facility])    [address] [place] [address]  [address] [facility] [address]   [phone]               Care Instructions         Additional Information About Your Visit        [employer] Information     [facility] gives you secure access to your electronic health record. If you see a primary care provider, you can also send messages to your care team and make appointments. If you have questions, please call your primary care clinic.  If you do not have a primary care provider, please call [phone] and they will assist you.        Care EveryWhere ID     This is your Care EveryWhere ID. This could be used by other organizations to access your [facility] medical records  [id number]        Your Vitals Were     Blood Pressure Temperature Respirations Height Weight Pulse Oximetry    148/89 98.8  F (37.1  C) 18 1.829 m (6') 88 kg (194 lb) 98%    BMI (Body Mass Index)                   26.31 kg/m2            Primary Care Provider Office Phone # Fax #    Shay Kirkpatrick [phone]      Equal Access to Services     FRANKLIN PORTILLO AH: Todd Carlisle  wakpda luluceroadaha, qaybta kaalmada magdaleno, devi de santiagoaachristal ah. So [facility] [phone].    ATENCIÓN: Si brandt garcia, tiene a zheng disposición servicios gratuitos de asistencia lingüística. Shahla al [phone].    We comply with applicable federal civil rights laws and [place] laws. We do not discriminate on the basis of race, color, national origin, age, disability sex, sexual orientation or gender identity.            Thank you!     Thank you for choosing [facility] for your care. Our goal is always to provide you with excellent care. Hearing back from our patients is one way we can continue to improve our services. Please take a few minutes to complete the written survey that you may receive in the mail after you visit with us. Thank you!             Medication List: This is a list of all your medications and when to take them. Check marks below indicate your daily home schedule. Keep this list as a reference.      Medications           Morning Afternoon Evening Bedtime As Needed    acetaminophen 325 MG tablet   Commonly known as:  TYLENOL   Take 2 tablets (650 mg) by mouth every 4 hours as needed for mild pain                                amLODIPine 5 MG tablet   Commonly known as:  NORVASC   Take 2 tablets (10 mg) by mouth daily                                cyclobenzaprine 10 MG tablet   Commonly known as:  FLEXERIL   Take 1 tablet (10 mg) by mouth 3 times daily as needed for muscle spasms                                fludrocortisone 0.1 MG tablet   Commonly known as:  FLORINEF   Take 1 tablet (0.1 mg) by mouth daily For elevated potassium                                furosemide 20 MG tablet   Commonly known as:  LASIX   Take 1 tablet (20 mg) by mouth every other day                                * insulin aspart 100 UNIT/ML injection   Commonly known as:  NovoLOG FLEXPEN   Inject 1-10 Units Subcutaneous 3 times daily (with meals) Pre-Meal  - 164 give 1 unit.   - 189  give 2 units.   - 214 give 3 units.   - 239 give 4 units.   - 264 give 5 units.   - 289 give 6 units.   - 314 give 7 units.   - 339 give 8 units.   - 364 give 9 units. BG greater than or equal to 365 give 10 units                                * insulin aspart 100 UNIT/ML injection   Commonly known as:  NovoLOG FLEXPEN   Inject 1-7 Units Subcutaneous At Bedtime  - 224 give 1 units.   - 249 give 2 units.   - 274 give 3 units.   - 299 give 4 units.   - 324 give 5 units.   - 349 give 6 units.  BG greater than or equal to 350 give 7 units.                                insulin glargine 100 UNIT/ML injection   Commonly known as:  LANTUS   Inject 5 Units Subcutaneous At Bedtime                                loperamide 2 MG capsule   Commonly known as:  IMODIUM   Take 2 capsules (4 mg) by mouth 2 times daily                                miconazole with skin protectant 2 % Crea cream   Apply topically 2 times daily                                ondansetron 4 MG ODT tab   Commonly known as:  ZOFRAN-ODT   Take 1 tablet (4 mg) by mouth every 8 hours as needed for nausea                                oxyCODONE 10 MG IR tablet   Commonly known as:  ROXICODONE   Take 0.5 tablets (5 mg) by mouth every 4 hours as needed for moderate to severe pain                                pantoprazole 40 MG EC tablet   Commonly known as:  PROTONIX   Take 1 tablet (40 mg) by mouth daily                                prochlorperazine 5 MG tablet   Commonly known as:  COMPAZINE   Take 1-2 tablets (5-10 mg) by mouth every 6 hours as needed for nausea or vomiting                                SALINE FLUSH IV                                sodium bicarbonate 650 MG tablet   Take 1 tablet (650 mg) by mouth 3 times daily                                sodium chloride (PF) 0.9% PF flush   Irrigate with 20 mLs as directed 2 times daily *10 mLs per drain   Last time  this was given:  9/21/2017 10:55 AM                                tacrolimus 1 MG capsule   Commonly known as:  GENERIC EQUIVALENT   Take 4 capsules (4 mg) by mouth every 12 hours                                tadalafil 5 MG tablet   Commonly known as:  CIALIS   Take 1 tablet (5 mg) by mouth daily Never use with nitroglycerin, terazosin or doxazosin.                                * Notice:  This list has 2 medication(s) that are the same as other medications prescribed for you. Read the directions carefully, and ask your doctor or other care provider to review them with you.

## 2017-09-21 NOTE — PROGRESS NOTES
1300  Assisted Camacho in arranging drainage bags so they will be easier to manage.  Denies any other needs.  No sedation for this procedure.  1325  DC to care of Mom per ambulation with walker.  CTRN

## 2017-09-21 NOTE — IP AVS SNAPSHOT
[address] [address]                                       After Visit Summary   9/21/2017    Camacho Bhagat    MRN: [id number]           After Visit Summary Signature Page     I have received my discharge instructions, and my questions have been answered. I have discussed any challenges I see with this plan with the nurse or doctor.    ..........................................................................................................................................  Patient/Patient Representative Signature      ..........................................................................................................................................  Patient Representative Print Name and Relationship to Patient    ..................................................               ................................................  Date                                            Time    ..........................................................................................................................................  Reviewed by Signature/Title    ...................................................              ..............................................  Date                                                            Time

## 2017-09-21 NOTE — PROCEDURES
Patient with history of liver transplantation and bowel injury causing multiple abdominal fluid collections requiring percutaneous drains to be placed. He had a 12 Romanian peritoneal drain placed via right abdomen on 8/15/17. He had a 24 Romanian Thal-quick placed in right retroperitoneum via right abdomen and left 12 Romanian drain placed into same abdominal fluid collection (that the left drain is in) on 8/24/17. Recently he has complained of soaked sheets while sleeping. He does not know which drain this may be coming from. He denies leakage during the day or with flushes. He has been flushing each 12 Romanian drain with 10 mL BID and the 24 Romanian drain with 20 mL BID. Outputs from each of the 12 Fr drains has been 30-50 mL/day each and outputs from the 24 Romanian drain has been 50-80 mL/day. No recent fever or significant pain.     Completed sinogram of existing 24 Fr right retroperitoneal drain (via right lateral abdomen) shows patent and well positioned drain within fluid collection of about 25 mL in volume. No communication to adjacent structures seen. Completed sinogram of 12 Fr right peritoneal drain (via right medial abdomen) shows patent and well positioned drain within right margin of peritoneal collection that appears to communicate with drain from left abdomen. No fistula seen. Completed sinogram of existing left 12 Fr peritoneal drain (via left abdomen) shows patent and well positioned drain within left margin of peritoneal collection. Lateral view confirms these drains are in the same collection. Again, no fistula seen.    Plan: All drains reconnected to gravity drainage. Continue to flush each 12 Fr drain with 10 mL BID and the 24 Fr drain with 20 mL BID. Drainage at night is most likely related to connection at drain hubs, so these were replaced. Return to IR when outputs fall to less than 20 mL/day, or sooner if necessary.    Asad Rogers PA-C  Interventional Radiology  [phone]

## 2017-09-22 ENCOUNTER — INFUSION THERAPY VISIT (OUTPATIENT)
Dept: INFUSION THERAPY | Facility: CLINIC | Age: 52
End: 2017-09-22
Attending: NURSE PRACTITIONER
Payer: COMMERCIAL

## 2017-09-22 VITALS
SYSTOLIC BLOOD PRESSURE: 155 MMHG | HEART RATE: 79 BPM | TEMPERATURE: 99.1 F | OXYGEN SATURATION: 98 % | RESPIRATION RATE: 16 BRPM | DIASTOLIC BLOOD PRESSURE: 85 MMHG

## 2017-09-22 DIAGNOSIS — D64.9 LOW HEMOGLOBIN: Primary | ICD-10-CM

## 2017-09-22 DIAGNOSIS — Z94.4 LIVER TRANSPLANT RECIPIENT (H): ICD-10-CM

## 2017-09-22 DIAGNOSIS — D84.9 IMMUNOSUPPRESSED STATUS (H): ICD-10-CM

## 2017-09-22 LAB
ABO + RH BLD: NORMAL
ABO + RH BLD: NORMAL
BLD GP AB SCN SERPL QL: NORMAL
BLD PROD TYP BPU: NORMAL
BLD PROD TYP BPU: NORMAL
BLD UNIT ID BPU: 0
BLOOD BANK CMNT PATIENT-IMP: NORMAL
BLOOD PRODUCT CODE: NORMAL
BPU ID: NORMAL
NUM BPU REQUESTED: 1
SPECIMEN EXP DATE BLD: NORMAL
TRANSFUSION STATUS PATIENT QL: NORMAL
TRANSFUSION STATUS PATIENT QL: NORMAL

## 2017-09-22 PROCEDURE — 40000556 ZZH STATISTIC PERIPHERAL IV START W US GUIDANCE: Mod: ZF

## 2017-09-22 PROCEDURE — P9016 RBC LEUKOCYTES REDUCED: HCPCS | Performed by: TRANSPLANT SURGERY

## 2017-09-22 RX ORDER — ALBUMIN (HUMAN) 12.5 G/50ML
12.5 SOLUTION INTRAVENOUS 4 TIMES DAILY PRN
Status: CANCELLED
Start: 2017-09-22

## 2017-09-22 NOTE — MR AVS SNAPSHOT
After Visit Summary   9/22/2017    Camacho Bhagat    MRN: [id number]           Patient Information     Date Of Birth          1965        Visit Information        Provider Department      9/22/2017 1:00 PM  44 ATC;  SPEC INFUSION [facility] Procedure        Today's Diagnoses     Low hemoglobin    -  1    Immunosuppressed status (H)        Liver transplant recipient (H)          Care Instructions    HOME CARE FOR PATIENTS RECEIVING BLOOD PRODUCTS    You have just received a blood product.  During the next 48 hours please be aware of the following symptoms of a blood product reaction.    The possible symptoms of a blood reaction are:      Chills    Fever temperature above 100.0 orally    Headache    Nausea    Persistent non-productive cough    Hives    Itching    Facial swelling or flushing    Difficulty breathing or wheezing    Bloody urine      If you experience any of these symptoms contact:    [phone]  Emergency Room    [phone]  Transplant Center  7:00 am - 6:30 pm     If you do not live locally you should contact your local physician.            Follow-ups after your visit        Your next 10 appointments already scheduled     Sep 25, 2017  9:00 AM CDT   Lab with  LAB   [facility] Lab ([facility])    [address] [place] [id number]   [phone]            Oct 02, 2017 11:00 AM CDT   Lab with  LAB   [facility] Lab ([facility])    [address] [place] [id number]   [phone]            Oct 02, 2017 11:30 AM CDT   (Arrive by 11:15 AM)   Liver Return Post Op with Jovan Tran MD   [facility] Solid Organ Transplant ([facility])    [address] [place] [id number]   [phone]            Oct 09, 2017 11:00 AM CDT   (Arrive by 10:45 AM)   New Patient Visit with Cristopher Willingham,  MD [facility] ([facility] [place])    [address]  4th Floor  [facility] [address] [phone]            Dec 01, 2017  8:45 AM CST   (Arrive by 8:30 AM)   Return Liver Transplant with Toñito Camejo MD   [facility] Hepatology ([facility] [place])    [address]  3rd Floor  [facility] [address] [phone]              Who to contact     If you have questions or need follow up information about today's clinic visit or your schedule please contact [facility] SPECIALTY AND PROCEDURE directly at [phone].  Normal or non-critical lab and imaging results will be communicated to you by MyChart, letter or phone within 4 business days after the clinic has received the results. If you do not hear from us within 7 days, please contact the clinic through [facility]ARN[employer] or phone. If you have a critical or abnormal lab result, we will notify you by phone as soon as possible.  Submit refill requests through Lettuce or call your pharmacy and they will forward the refill request to us. Please allow 3 business days for your refill to be completed.          Additional Information About Your Visit        [employer] Information     Lettuce gives you secure access to your electronic health record. If you see a primary care provider, you can also send messages to your care team and make appointments. If you have questions, please call your primary care clinic.  If you do not have a primary care provider, please call [phone] and they will assist you.        Care EveryWhere ID     This is your Care EveryWhere ID. This could be used by other organizations to access your [place] [facility] medical records  [id number]        Your Vitals Were     Pulse Temperature Respirations Pulse Oximetry          80 99.1  F (37.3  C) (Tympanic) 16 97%         Blood Pressure from Last 3 Encounters:   09/22/17 151/80   09/21/17 148/89   09/20/17 133/84     Weight from Last 3 Encounters:   09/21/17 88 kg (194 lb)   09/20/17 88.1 kg (194 lb 3.2 oz)   09/18/17 93.6 kg (206 lb 5.6 oz)              We Performed the Following     Transfuse red blood cell unit          Today's Medication Changes          These changes are accurate as of: 9/22/17  2:37 PM.  If you have any questions, ask your nurse or doctor.               These medicines have changed or have updated prescriptions.        Dose/Directions    insulin glargine 100 UNIT/ML injection   Commonly known as:  LANTUS   This may have changed:    - how much to take  - when to take this   Used for:  Liver transplant recipient (H)        Dose:  5 Units   Inject 5 Units Subcutaneous At Bedtime   Quantity:  3 mL   Refills:  1                Primary Care Provider Office Phone # Fax #    Shay Kirkpatrick [phone]       [address] [place] [phone]  [facility] [address]        Equal Access to Services     FRANKLIN PORTILLO : Hadii tavo duttono Sojose, waaxda luqadaha, qaybta kaalmada adeelenayada, devi duckworth. So [facility] [phone].    ATENCIÓN: Si habla español, tiene a zheng disposición servicios gratuitos de asistencia lingüística. Shahla al [phone].    We comply with applicable federal civil rights laws and [place] laws. We do not discriminate on the basis of race, color, national origin, age, disability sex, sexual orientation or gender identity.            Thank you!     Thank you for choosing [facility] [place] SPECIALTY AND PROCEDURE  for your care. Our goal is always to provide you with excellent care. Hearing back from our patients is one way we can continue to improve our services. Please take a few minutes to complete the written survey that you may receive in the mail after your visit with us. Thank you!             Your Updated Medication List - Protect others around you: Learn how to safely use, store and throw away your medicines at www.disposemymeds.org.           This list is accurate as of: 9/22/17  2:37 PM.  Always use your most recent med list.                   Brand Name Dispense Instructions for use Diagnosis    acetaminophen 325 MG tablet    TYLENOL    100 tablet    Take 2 tablets (650 mg) by mouth every 4 hours as needed for mild pain    Osteoarthritis, unspecified osteoarthritis type, unspecified site       amLODIPine 5 MG tablet    NORVASC    60 tablet    Take 2 tablets (10 mg) by mouth daily    History of liver transplant (H), Elevated blood pressure reading, Long-term use of immunosuppressant medication       cyclobenzaprine 10 MG tablet    FLEXERIL    90 tablet    Take 1 tablet (10 mg) by mouth 3 times daily as needed for muscle spasms    Immunosuppression (H)       fludrocortisone 0.1 MG tablet    FLORINEF    30 tablet    Take 1 tablet (0.1 mg) by mouth daily For elevated potassium    History of liver transplant (H), Long-term use of immunosuppressant medication       furosemide 20 MG tablet    LASIX    30 tablet    Take 1 tablet (20 mg) by mouth every other day    Hyperkalemia       * insulin aspart 100 UNIT/ML injection    NovoLOG FLEXPEN    3 mL    Inject 1-10 Units Subcutaneous 3 times daily (with meals) Pre-Meal  - 164 give 1 unit.   - 189 give 2 units.   - 214 give 3 units.   - 239 give 4 units.   - 264 give 5 units.   - 289 give 6 units.   - 314 give 7 units.   - 339 give 8 units.   - 364 give 9 units. BG greater than or equal to 365 give 10 units    Type 2 diabetes mellitus with diabetic polyneuropathy, unspecified long term insulin use status (H)       * insulin aspart 100 UNIT/ML injection    NovoLOG FLEXPEN    3 mL    Inject 1-7 Units Subcutaneous At Bedtime  - 224 give 1 units.   - 249 give 2 units.   - 274 give 3 units.   - 299 give 4 units.   - 324 give 5 units.   - 349 give 6 units.  BG greater than or equal to 350 give 7 units.    Type 2 diabetes  mellitus with diabetic polyneuropathy, unspecified long term insulin use status (H)       insulin glargine 100 UNIT/ML injection    LANTUS    3 mL    Inject 5 Units Subcutaneous At Bedtime    Liver transplant recipient (H)       loperamide 2 MG capsule    IMODIUM    120 capsule    Take 2 capsules (4 mg) by mouth 2 times daily    S/P right hemicolectomy       miconazole with skin protectant 2 % Crea cream     1 Tube    Apply topically 2 times daily    Liver transplant recipient (H)       ondansetron 4 MG ODT tab    ZOFRAN-ODT    30 tablet    Take 1 tablet (4 mg) by mouth every 8 hours as needed for nausea    Cirrhosis of liver with ascites, unspecified hepatic cirrhosis type (H), Nausea       oxyCODONE 10 MG IR tablet    ROXICODONE    30 tablet    Take 0.5 tablets (5 mg) by mouth every 4 hours as needed for moderate to severe pain    History of liver transplant (H)       pantoprazole 40 MG EC tablet    PROTONIX    30 tablet    Take 1 tablet (40 mg) by mouth daily    S/P liver transplant (H)       prochlorperazine 5 MG tablet    COMPAZINE    90 tablet    Take 1-2 tablets (5-10 mg) by mouth every 6 hours as needed for nausea or vomiting    Aftercare following organ transplant, Status post liver transplantation (H)       SALINE FLUSH IV           sodium bicarbonate 650 MG tablet     90 tablet    Take 1 tablet (650 mg) by mouth 3 times daily    Liver transplant recipient (H)       sodium chloride (PF) 0.9% PF flush     280 mL    Irrigate with 20 mLs as directed 2 times daily *10 mLs per drain    Intra-abdominal abscess (H)       tacrolimus 1 MG capsule    GENERIC EQUIVALENT    240 capsule    Take 4 capsules (4 mg) by mouth every 12 hours    Liver transplant recipient (H)       tadalafil 5 MG tablet    CIALIS    30 tablet    Take 1 tablet (5 mg) by mouth daily Never use with nitroglycerin, terazosin or doxazosin.    Drug-induced erectile dysfunction       * Notice:  This list has 2 medication(s) that are the same as other  medications prescribed for you. Read the directions carefully, and ask your doctor or other care provider to review them with you.

## 2017-09-22 NOTE — PATIENT INSTRUCTIONS
HOME CARE FOR PATIENTS RECEIVING BLOOD PRODUCTS    You have just received a blood product.  During the next 48 hours please be aware of the following symptoms of a blood product reaction.    The possible symptoms of a blood reaction are:      Chills    Fever temperature above 100.0 orally    Headache    Nausea    Persistent non-productive cough    Hives    Itching    Facial swelling or flushing    Difficulty breathing or wheezing    Bloody urine      If you experience any of these symptoms contact:    [phone]  Emergency Room    [phone]  Transplant Center  7:00 am - 6:30 pm     If you do not live locally you should contact your local physician.

## 2017-09-22 NOTE — PROGRESS NOTES
Nursing Note  Camacho Bhagat [facility] for:   Chief Complaint   Patient presents with     Blood Transfusion     1 Unit PRBCs     During [facility]'s [facility] appointment, orders from Dr Tran/Radha Osorio NP  were completed.  Frequency: once    Progress note:  Patient identification verified by name and date of birth.  Assessment completed.  Vitals recorded in Doc Flowsheets.  Patient was provided with education regarding transfusion and possible side effects.  Patient verbalized understanding.      needed: No  Premedications: were not ordered.  Transfusion length: approximately one hour 40 minutes.   Labs: were not ordered for this appointment.  Vascular access: peripheral IV was placed by vascular access.  Treatment Conditions: non-applicable.  Patient tolerated transfusion: well.  Blood Transfusion Consent: signed 09/21/2017 (telephone consent)    Discrepancy with original order signed by Dr ANDREW Tran (non-irradiated PRBC indicated in one section of order and irradiated PRBC indicated in another section of order). Spoke w/ Radha Osorio NP who clarified that PRBCs should be non-irradiated.    Discharge Plan:   Follow up plan of care with: transplant coordinator.  Discharge instructions were reviewed with patient.  Patient/representative verbalized understanding of discharge instructions and all questions answered.  [facility] discharged [facility] in stable condition.    Perla Leal RN        /79 (BP Location: Right arm)  Pulse 88  Temp 97  F (36.1  C) (Oral)  Resp 16  SpO2 98%

## 2017-09-25 ENCOUNTER — HOSPITAL ENCOUNTER (OUTPATIENT)
Dept: LAB | Facility: CLINIC | Age: 52
Discharge: HOME | End: 2017-09-25
Attending: INTERNAL MEDICINE | Admitting: INTERNAL MEDICINE
Payer: COMMERCIAL

## 2017-09-25 ENCOUNTER — TELEPHONE (OUTPATIENT)
Dept: TRANSPLANT | Facility: CLINIC | Age: 52
End: 2017-09-25

## 2017-09-25 DIAGNOSIS — Z94.4 HISTORY OF LIVER TRANSPLANT (H): ICD-10-CM

## 2017-09-25 DIAGNOSIS — Z79.60 LONG-TERM USE OF IMMUNOSUPPRESSANT MEDICATION: ICD-10-CM

## 2017-09-25 DIAGNOSIS — D72.819 DECREASED WHITE BLOOD CELL COUNT: ICD-10-CM

## 2017-09-25 LAB
ALBUMIN SERPL-MCNC: 2.6 G/DL (ref 3.4–5)
ALP SERPL-CCNC: 218 U/L (ref 40–150)
ALT SERPL W P-5'-P-CCNC: 26 U/L (ref 0–70)
ANION GAP SERPL CALCULATED.3IONS-SCNC: 4 MMOL/L (ref 3–14)
AST SERPL W P-5'-P-CCNC: 48 U/L (ref 0–45)
BASOPHILS # BLD AUTO: 0 10E9/L (ref 0–0.2)
BASOPHILS NFR BLD AUTO: 0.4 %
BILIRUB DIRECT SERPL-MCNC: 0.2 MG/DL (ref 0–0.2)
BILIRUB SERPL-MCNC: 0.5 MG/DL (ref 0.2–1.3)
BUN SERPL-MCNC: 32 MG/DL (ref 7–30)
CALCIUM SERPL-MCNC: 8.5 MG/DL (ref 8.5–10.1)
CHLORIDE SERPL-SCNC: 108 MMOL/L (ref 94–109)
CO2 SERPL-SCNC: 26 MMOL/L (ref 20–32)
CREAT SERPL-MCNC: 1.33 MG/DL (ref 0.66–1.25)
DIFFERENTIAL METHOD BLD: NORMAL
EOSINOPHIL # BLD AUTO: 0.1 10E9/L (ref 0–0.7)
EOSINOPHIL NFR BLD AUTO: 2 %
ERYTHROCYTE [DISTWIDTH] IN BLOOD BY AUTOMATED COUNT: 17.4 % (ref 10–15)
GFR SERPL CREATININE-BSD FRML MDRD: 56 ML/MIN/1.7M2
GLUCOSE SERPL-MCNC: 108 MG/DL (ref 70–99)
HCT VFR BLD AUTO: 24.8 % (ref 40–53)
HGB BLD-MCNC: 8.2 G/DL (ref 13.3–17.7)
IMM GRANULOCYTES # BLD: 0 10E9/L (ref 0–0.4)
IMM GRANULOCYTES NFR BLD: 0.8 %
LYMPHOCYTES # BLD AUTO: 1.3 10E9/L (ref 0.8–5.3)
LYMPHOCYTES NFR BLD AUTO: 26.1 %
MAGNESIUM SERPL-MCNC: 1.7 MG/DL (ref 1.6–2.3)
MCH RBC QN AUTO: 31.1 PG (ref 26.5–33)
MCHC RBC AUTO-ENTMCNC: 33.1 G/DL (ref 31.5–36.5)
MCV RBC AUTO: 94 FL (ref 78–100)
MONOCYTES # BLD AUTO: 0.3 10E9/L (ref 0–1.3)
MONOCYTES NFR BLD AUTO: 6.3 %
NEUTROPHILS # BLD AUTO: 3.2 10E9/L (ref 1.6–8.3)
NEUTROPHILS NFR BLD AUTO: 64.4 %
NRBC # BLD AUTO: 0 10*3/UL
NRBC BLD AUTO-RTO: 0 /100
PHOSPHATE SERPL-MCNC: 4.4 MG/DL (ref 2.5–4.5)
PLATELET # BLD AUTO: 66 10E9/L (ref 150–450)
POTASSIUM SERPL-SCNC: 6 MMOL/L (ref 3.4–5.3)
PROT SERPL-MCNC: 7.9 G/DL (ref 6.8–8.8)
RBC # BLD AUTO: 2.64 10E12/L (ref 4.4–5.9)
SODIUM SERPL-SCNC: 138 MMOL/L (ref 133–144)
WBC # BLD AUTO: 4.9 10E9/L (ref 4–11)

## 2017-09-25 PROCEDURE — 36415 COLL VENOUS BLD VENIPUNCTURE: CPT | Performed by: INTERNAL MEDICINE

## 2017-09-25 PROCEDURE — 85027 COMPLETE CBC AUTOMATED: CPT | Performed by: INTERNAL MEDICINE

## 2017-09-25 PROCEDURE — 80048 BASIC METABOLIC PNL TOTAL CA: CPT | Performed by: INTERNAL MEDICINE

## 2017-09-25 PROCEDURE — 83735 ASSAY OF MAGNESIUM: CPT | Performed by: INTERNAL MEDICINE

## 2017-09-25 PROCEDURE — 80197 ASSAY OF TACROLIMUS: CPT | Performed by: INTERNAL MEDICINE

## 2017-09-25 PROCEDURE — 85004 AUTOMATED DIFF WBC COUNT: CPT | Performed by: INTERNAL MEDICINE

## 2017-09-25 PROCEDURE — 84100 ASSAY OF PHOSPHORUS: CPT | Performed by: INTERNAL MEDICINE

## 2017-09-25 PROCEDURE — 80076 HEPATIC FUNCTION PANEL: CPT | Performed by: INTERNAL MEDICINE

## 2017-09-25 RX ORDER — SODIUM POLYSTYRENE SULFONATE 30 G/120ML
60 ENEMA (ML) RECTAL ONCE
Qty: 240 ML | Refills: 0 | COMMUNITY
Start: 2017-09-25 | End: 2017-10-24

## 2017-09-25 RX ORDER — FLUDROCORTISONE ACETATE 0.1 MG/1
0.2 TABLET ORAL DAILY
Qty: 60 TABLET | Refills: 3 | Status: ON HOLD | COMMUNITY
Start: 2017-09-25 | End: 2017-10-27

## 2017-09-25 NOTE — TELEPHONE ENCOUNTER
Message left for Camacho re: elevated potassium with plan per , requesting call back.  Spoke with Camacho, instructions given for taking kayexalate, and increased dose of florinef.    Camacho verbalized understanding, instructed to increase hydration, and to drink extra fluids with the kayexalate.  Plan recheck of potassium only on Wed.

## 2017-09-25 NOTE — TELEPHONE ENCOUNTER
Potassium = 6.0.          Per review with :     Please give him 60 grams of Kaxalate po and increase Florinef to 0.2mg po daily.

## 2017-09-26 ENCOUNTER — TELEPHONE (OUTPATIENT)
Dept: TRANSPLANT | Facility: CLINIC | Age: 52
End: 2017-09-26

## 2017-09-26 DIAGNOSIS — E87.5 HYPERKALEMIA: ICD-10-CM

## 2017-09-26 DIAGNOSIS — Z94.4 LIVER TRANSPLANT RECIPIENT (H): ICD-10-CM

## 2017-09-26 LAB
TACROLIMUS BLD-MCNC: 3.1 UG/L (ref 5–15)
TME LAST DOSE: ABNORMAL H

## 2017-09-26 RX ORDER — FUROSEMIDE 20 MG
20 TABLET ORAL DAILY
Qty: 30 TABLET | Refills: 3 | Status: ON HOLD | OUTPATIENT
Start: 2017-09-26 | End: 2017-10-27

## 2017-09-26 RX ORDER — TACROLIMUS 1 MG/1
5 CAPSULE ORAL EVERY 12 HOURS
Qty: 300 CAPSULE | Refills: 11 | Status: SHIPPED | OUTPATIENT
Start: 2017-09-26 | End: 2017-09-29

## 2017-09-26 NOTE — TELEPHONE ENCOUNTER
Spoke with Camacho about tacrolimus level 3.1, too low.  Instructed to increase dose to 5mg every 12 hours.  Per review with  pt instructed to take lasix daily, had been taking twice a week.  Plan repeat labs on Thursday.  Camacho verbalized understanding of medication changes, and plan for repeat labs on Thursday.

## 2017-09-27 ENCOUNTER — TELEPHONE (OUTPATIENT)
Dept: SURGERY | Facility: CLINIC | Age: 52
End: 2017-09-27

## 2017-09-27 NOTE — TELEPHONE ENCOUNTER
LAPAROTOMY EXPLORATORY N/A General   Exploratory Laparotomy, Right angelique-colectomy, end ileostomy, mucosal fistula, partial omentectomy       7/27/17 surgery as above with Dr. Dinh.

## 2017-09-27 NOTE — TELEPHONE ENCOUNTER
Humaira Camacho's mother is calling to facilitate orders for more ostomy supplies.  She reports Camacho was recently discharged from the hospital.  Quantity on D/C Rx is for 10/month.  He's been on a potassium lowering medication that gave him diarrhea and they have gone thru 4 bags in one day.  She has been in contact with [employer] and they instructed her to call us.  She is purchasing bags out of pocket because they need this issue resolved now!  She reports Camacho was recently discharged after being in the hospital for quite some time. Needs bags and barrier rings.  She was told by [employer] that they could get #20 with the insurance he has.  He hs not been to our clinic.  He was seen by Dr. Dinh in the hospital and has appointment with Dr. Stevens 10/9.    I advised she call [employer] and have them fax us new orders for signatures attention Samuel or Blayne. Fax number provided. If they have a problem with that, have [employer] call us.    Also spoke with Camacho, he is requesting sooner appointment with Dr. Stevens than 10/9 if possible?  This is to discuss when / if ostomy takedown is feasible?    Discussed with Samuel so she is aware.

## 2017-09-28 ENCOUNTER — HOSPITAL ENCOUNTER (OUTPATIENT)
Dept: LAB | Facility: CLINIC | Age: 52
Discharge: HOME | End: 2017-09-28
Attending: INTERNAL MEDICINE | Admitting: INTERNAL MEDICINE
Payer: COMMERCIAL

## 2017-09-28 ENCOUNTER — TELEPHONE (OUTPATIENT)
Dept: SURGERY | Facility: CLINIC | Age: 52
End: 2017-09-28

## 2017-09-28 DIAGNOSIS — Z94.4 HISTORY OF LIVER TRANSPLANT (H): ICD-10-CM

## 2017-09-28 DIAGNOSIS — D72.819 DECREASED WHITE BLOOD CELL COUNT: ICD-10-CM

## 2017-09-28 DIAGNOSIS — Z79.60 LONG-TERM USE OF IMMUNOSUPPRESSANT MEDICATION: ICD-10-CM

## 2017-09-28 LAB
ALBUMIN SERPL-MCNC: 2.8 G/DL (ref 3.4–5)
ALP SERPL-CCNC: 196 U/L (ref 40–150)
ALT SERPL W P-5'-P-CCNC: 26 U/L (ref 0–70)
ANION GAP SERPL CALCULATED.3IONS-SCNC: 5 MMOL/L (ref 3–14)
AST SERPL W P-5'-P-CCNC: 35 U/L (ref 0–45)
BASOPHILS # BLD AUTO: 0 10E9/L (ref 0–0.2)
BASOPHILS NFR BLD AUTO: 0.4 %
BILIRUB DIRECT SERPL-MCNC: 0.2 MG/DL (ref 0–0.2)
BILIRUB SERPL-MCNC: 0.6 MG/DL (ref 0.2–1.3)
BUN SERPL-MCNC: 35 MG/DL (ref 7–30)
CALCIUM SERPL-MCNC: 8.5 MG/DL (ref 8.5–10.1)
CHLORIDE SERPL-SCNC: 107 MMOL/L (ref 94–109)
CO2 SERPL-SCNC: 24 MMOL/L (ref 20–32)
CREAT SERPL-MCNC: 1.2 MG/DL (ref 0.66–1.25)
DIFFERENTIAL METHOD BLD: NORMAL
EOSINOPHIL # BLD AUTO: 0.1 10E9/L (ref 0–0.7)
EOSINOPHIL NFR BLD AUTO: 2.6 %
ERYTHROCYTE [DISTWIDTH] IN BLOOD BY AUTOMATED COUNT: 17.2 % (ref 10–15)
GFR SERPL CREATININE-BSD FRML MDRD: 63 ML/MIN/1.7M2
GLUCOSE SERPL-MCNC: 155 MG/DL (ref 70–99)
HCT VFR BLD AUTO: 22.8 % (ref 40–53)
HGB BLD-MCNC: 7.7 G/DL (ref 13.3–17.7)
IMM GRANULOCYTES # BLD: 0 10E9/L (ref 0–0.4)
IMM GRANULOCYTES NFR BLD: 0.6 %
LYMPHOCYTES # BLD AUTO: 1.6 10E9/L (ref 0.8–5.3)
LYMPHOCYTES NFR BLD AUTO: 29.2 %
MAGNESIUM SERPL-MCNC: 1.6 MG/DL (ref 1.6–2.3)
MCH RBC QN AUTO: 31.4 PG (ref 26.5–33)
MCHC RBC AUTO-ENTMCNC: 33.8 G/DL (ref 31.5–36.5)
MCV RBC AUTO: 93 FL (ref 78–100)
MONOCYTES # BLD AUTO: 0.3 10E9/L (ref 0–1.3)
MONOCYTES NFR BLD AUTO: 6.3 %
NEUTROPHILS # BLD AUTO: 3.3 10E9/L (ref 1.6–8.3)
NEUTROPHILS NFR BLD AUTO: 60.9 %
NRBC # BLD AUTO: 0 10*3/UL
NRBC BLD AUTO-RTO: 0 /100
PHOSPHATE SERPL-MCNC: 3.5 MG/DL (ref 2.5–4.5)
PLATELET # BLD AUTO: 72 10E9/L (ref 150–450)
POTASSIUM SERPL-SCNC: 5.6 MMOL/L (ref 3.4–5.3)
PROT SERPL-MCNC: 8 G/DL (ref 6.8–8.8)
RBC # BLD AUTO: 2.45 10E12/L (ref 4.4–5.9)
SODIUM SERPL-SCNC: 136 MMOL/L (ref 133–144)
TACROLIMUS BLD-MCNC: <3 UG/L (ref 5–15)
TME LAST DOSE: ABNORMAL H
WBC # BLD AUTO: 5.4 10E9/L (ref 4–11)

## 2017-09-28 PROCEDURE — 85027 COMPLETE CBC AUTOMATED: CPT | Performed by: INTERNAL MEDICINE

## 2017-09-28 PROCEDURE — 84132 ASSAY OF SERUM POTASSIUM: CPT | Performed by: INTERNAL MEDICINE

## 2017-09-28 PROCEDURE — 84100 ASSAY OF PHOSPHORUS: CPT | Performed by: INTERNAL MEDICINE

## 2017-09-28 PROCEDURE — 85004 AUTOMATED DIFF WBC COUNT: CPT | Performed by: INTERNAL MEDICINE

## 2017-09-28 PROCEDURE — 80076 HEPATIC FUNCTION PANEL: CPT | Performed by: INTERNAL MEDICINE

## 2017-09-28 PROCEDURE — 36415 COLL VENOUS BLD VENIPUNCTURE: CPT | Performed by: INTERNAL MEDICINE

## 2017-09-28 PROCEDURE — 80048 BASIC METABOLIC PNL TOTAL CA: CPT | Performed by: INTERNAL MEDICINE

## 2017-09-28 PROCEDURE — 80197 ASSAY OF TACROLIMUS: CPT | Performed by: INTERNAL MEDICINE

## 2017-09-28 PROCEDURE — 83735 ASSAY OF MAGNESIUM: CPT | Performed by: INTERNAL MEDICINE

## 2017-09-28 NOTE — TELEPHONE ENCOUNTER
Patient left a message returning my call.  Patient requests to have a later appointment time if possible.  Called and spoke with patient.  I explained that unfortunately that is the only time available right now, and that her next available appointment is 11/1/17.  Patient states he will keep his currently scheduled appointment.  Informed patient that if we have a cancellation for a later time, that we can contact him to reschedule.  Patient confirms.

## 2017-09-28 NOTE — TELEPHONE ENCOUNTER
Patient was incorrectly scheduled by call center to see Dr. Willingham for a new surgical consult (discuss takedown).  Patient had surgery with Dr. Dinh 07/26/17, and needs to follow up with original surgeon.  Called and left message for patient on home and cell phone, informing him he needs to follow up with original surgeon.  Informed patient his appointment with Dr. Willingham on 10/9/17 has been cancelled, and rescheduled to 10/11/17 at 8:15 am with Dr. Dinh.  Asked patient to call and confirm.  Provided my direct number.  [employer] message also sent to patient.

## 2017-09-29 DIAGNOSIS — Z94.4 LIVER TRANSPLANT RECIPIENT (H): ICD-10-CM

## 2017-09-29 RX ORDER — TACROLIMUS 1 MG/1
1 CAPSULE ORAL EVERY 12 HOURS
Qty: 60 CAPSULE | Refills: 11 | Status: SHIPPED | OUTPATIENT
Start: 2017-09-29 | End: 2017-10-13

## 2017-09-29 RX ORDER — TACROLIMUS 5 MG/1
5 CAPSULE ORAL EVERY 12 HOURS
Qty: 60 CAPSULE | Refills: 11 | Status: SHIPPED | OUTPATIENT
Start: 2017-09-29 | End: 2017-10-13

## 2017-09-29 NOTE — TELEPHONE ENCOUNTER
Pt confirmed last tacrolimus level was 12 hour trough. He is currently taking 5 mg bid. Per verbal from txp coordinator Abril BOX, increase to 6 mg every 12 hours.    Pt's diarrhea stopped 2 days after starting Florinef.

## 2017-09-29 NOTE — TELEPHONE ENCOUNTER
Please call Camacho about most recent tacrolimus level of < 3.0.   Verify his current dose and if this level was 12 hour level.   Check if he is having diarrhea, if so how much, and if watery liquid.   It looks like he has both 1mg and 0.5mg capsules, need to know which dose size he is taking, as we have increased the dose every week x 3.      If has diarrhea, please ask him to collect a stool sample for C diff, send orders, and let him know he will need to use the specific collection container provided by the lab, can get the container locally and drop sample off.

## 2017-10-02 ENCOUNTER — OFFICE VISIT (OUTPATIENT)
Dept: TRANSPLANT | Facility: CLINIC | Age: 52
End: 2017-10-02
Attending: TRANSPLANT SURGERY
Payer: COMMERCIAL

## 2017-10-02 VITALS
OXYGEN SATURATION: 98 % | RESPIRATION RATE: 16 BRPM | WEIGHT: 192.2 LBS | TEMPERATURE: 98.1 F | DIASTOLIC BLOOD PRESSURE: 90 MMHG | BODY MASS INDEX: 26.03 KG/M2 | HEIGHT: 72 IN | HEART RATE: 91 BPM | SYSTOLIC BLOOD PRESSURE: 146 MMHG

## 2017-10-02 DIAGNOSIS — D72.819 DECREASED WHITE BLOOD CELL COUNT: ICD-10-CM

## 2017-10-02 DIAGNOSIS — Z94.4 LIVER TRANSPLANT RECIPIENT (H): Primary | ICD-10-CM

## 2017-10-02 DIAGNOSIS — Z79.60 LONG-TERM USE OF IMMUNOSUPPRESSANT MEDICATION: ICD-10-CM

## 2017-10-02 DIAGNOSIS — Z94.4 HISTORY OF LIVER TRANSPLANT (H): ICD-10-CM

## 2017-10-02 LAB
ALBUMIN SERPL-MCNC: 2.8 G/DL (ref 3.4–5)
ALP SERPL-CCNC: 203 U/L (ref 40–150)
ALT SERPL W P-5'-P-CCNC: 24 U/L (ref 0–70)
ANION GAP SERPL CALCULATED.3IONS-SCNC: 6 MMOL/L (ref 3–14)
AST SERPL W P-5'-P-CCNC: 30 U/L (ref 0–45)
BASOPHILS # BLD AUTO: 0 10E9/L (ref 0–0.2)
BASOPHILS NFR BLD AUTO: 0.3 %
BILIRUB DIRECT SERPL-MCNC: 0.2 MG/DL (ref 0–0.2)
BILIRUB SERPL-MCNC: 0.8 MG/DL (ref 0.2–1.3)
BUN SERPL-MCNC: 39 MG/DL (ref 7–30)
CALCIUM SERPL-MCNC: 8.2 MG/DL (ref 8.5–10.1)
CHLORIDE SERPL-SCNC: 104 MMOL/L (ref 94–109)
CO2 SERPL-SCNC: 22 MMOL/L (ref 20–32)
CREAT SERPL-MCNC: 1.6 MG/DL (ref 0.66–1.25)
DIFFERENTIAL METHOD BLD: NORMAL
EOSINOPHIL # BLD AUTO: 0.2 10E9/L (ref 0–0.7)
EOSINOPHIL NFR BLD AUTO: 3.8 %
ERYTHROCYTE [DISTWIDTH] IN BLOOD BY AUTOMATED COUNT: 17.1 % (ref 10–15)
GFR SERPL CREATININE-BSD FRML MDRD: 45 ML/MIN/1.7M2
GLUCOSE SERPL-MCNC: 201 MG/DL (ref 70–99)
HCT VFR BLD AUTO: 21.5 % (ref 40–53)
HGB BLD-MCNC: 7.1 G/DL (ref 13.3–17.7)
IMM GRANULOCYTES # BLD: 0 10E9/L (ref 0–0.4)
IMM GRANULOCYTES NFR BLD: 0.3 %
LYMPHOCYTES # BLD AUTO: 1.3 10E9/L (ref 0.8–5.3)
LYMPHOCYTES NFR BLD AUTO: 33.3 %
MAGNESIUM SERPL-MCNC: 1.7 MG/DL (ref 1.6–2.3)
MCH RBC QN AUTO: 30.7 PG (ref 26.5–33)
MCHC RBC AUTO-ENTMCNC: 33 G/DL (ref 31.5–36.5)
MCV RBC AUTO: 93 FL (ref 78–100)
MONOCYTES # BLD AUTO: 0.3 10E9/L (ref 0–1.3)
MONOCYTES NFR BLD AUTO: 6.4 %
NEUTROPHILS # BLD AUTO: 2.2 10E9/L (ref 1.6–8.3)
NEUTROPHILS NFR BLD AUTO: 55.9 %
NRBC # BLD AUTO: 0 10*3/UL
NRBC BLD AUTO-RTO: 0 /100
PHOSPHATE SERPL-MCNC: 4.9 MG/DL (ref 2.5–4.5)
PLATELET # BLD AUTO: 69 10E9/L (ref 150–450)
POTASSIUM SERPL-SCNC: 5.6 MMOL/L (ref 3.4–5.3)
PROT SERPL-MCNC: 7.7 G/DL (ref 6.8–8.8)
RBC # BLD AUTO: 2.31 10E12/L (ref 4.4–5.9)
SODIUM SERPL-SCNC: 133 MMOL/L (ref 133–144)
TACROLIMUS BLD-MCNC: <3 UG/L (ref 5–15)
TME LAST DOSE: ABNORMAL H
WBC # BLD AUTO: 3.9 10E9/L (ref 4–11)

## 2017-10-02 PROCEDURE — 80048 BASIC METABOLIC PNL TOTAL CA: CPT | Performed by: INTERNAL MEDICINE

## 2017-10-02 PROCEDURE — 85004 AUTOMATED DIFF WBC COUNT: CPT | Performed by: INTERNAL MEDICINE

## 2017-10-02 PROCEDURE — G0008 ADMIN INFLUENZA VIRUS VAC: HCPCS

## 2017-10-02 PROCEDURE — 80076 HEPATIC FUNCTION PANEL: CPT | Performed by: INTERNAL MEDICINE

## 2017-10-02 PROCEDURE — 25000128 H RX IP 250 OP 636: Mod: ZF | Performed by: TRANSPLANT SURGERY

## 2017-10-02 PROCEDURE — 90686 IIV4 VACC NO PRSV 0.5 ML IM: CPT | Mod: ZF | Performed by: TRANSPLANT SURGERY

## 2017-10-02 PROCEDURE — 36415 COLL VENOUS BLD VENIPUNCTURE: CPT | Performed by: INTERNAL MEDICINE

## 2017-10-02 PROCEDURE — 85027 COMPLETE CBC AUTOMATED: CPT | Performed by: INTERNAL MEDICINE

## 2017-10-02 PROCEDURE — 84100 ASSAY OF PHOSPHORUS: CPT | Performed by: INTERNAL MEDICINE

## 2017-10-02 PROCEDURE — 83735 ASSAY OF MAGNESIUM: CPT | Performed by: INTERNAL MEDICINE

## 2017-10-02 PROCEDURE — 80197 ASSAY OF TACROLIMUS: CPT | Performed by: INTERNAL MEDICINE

## 2017-10-02 RX ADMIN — INFLUENZA A VIRUS A/MICHIGAN/45/2015 X-275 (H1N1) ANTIGEN (FORMALDEHYDE INACTIVATED), INFLUENZA A VIRUS A/HONG KONG/4801/2014 X-263B (H3N2) ANTIGEN (FORMALDEHYDE INACTIVATED), INFLUENZA B VIRUS B/PHUKET/3073/2013 ANTIGEN (FORMALDEHYDE INACTIVATED), AND INFLUENZA B VIRUS B/BRISBANE/60/2008 ANTIGEN (FORMALDEHYDE INACTIVATED) 0.5 ML: 15; 15; 15; 15 INJECTION, SUSPENSION INTRAMUSCULAR at 11:47

## 2017-10-02 ASSESSMENT — PAIN SCALES - GENERAL: PAINLEVEL: MODERATE PAIN (4)

## 2017-10-02 NOTE — PROGRESS NOTES
HPI      ROS      Physical Exam    Transplant Surgery -OUTPATIENT IMMUNOSUPPRESSION PROGRESS NOTE    Date of Visit: 10/02/2017    Transplants:  3/4/2017 (Liver); Postoperative day:  212  ASSESMENT AND PLAN:  1.Graft Function: Liver allograft: no rejection or technical problems.    2.Immunosuppression Management: keep tacrolimus levels at 8  3.Hypertension: ok  4.Renal Function: elevated creatinine to 1.6  5.Lab frequency: weekly  6.Other:  Lasix qod  Drain on the right side removed    Date: October 2, 2017    Transplant:  [x]                             Liver [x]                              Kidney []                             Pancreas []                              Other:             Chief Complaint:Liver Transplant ()  doing well, eating better, drain on right side not much  History of Present Illness:  Patient Active Problem List   Diagnosis     Carpal tunnel syndrome     Essential hypertension     Personal history of thrombophlebitis     Esophageal reflux     Depressive disorder, not elsewhere classified     Hyperlipidemia     Sleep apnea     Testicular hypofunction     HYPERLIPIDEMIA LDL GOAL <100     Fibromyalgia     OA (osteoarthritis)     Sicca syndrome (H)     Knee pain     Primary localized osteoarthrosis, lower leg     Diabetes mellitus with neurological manifestation (H)     Medication refill- do not delete      Pain medication agreement signed - Shay Kirkpatrick 2/7/14     Hepatocellular carcinoma (H)     Uncontrolled type 2 diabetes mellitus with hyperglycemia, with long-term current use of insulin (H)     Osteoarthritis     Rheumatoid arthritis (H)     Hyperkalemia     Liver transplant recipient (H)     Immunosuppressed status (H)     Neck pain     Back pain     Sepsis (H)     Typhlitis     Neutropenic colitis (H)     S/P liver transplant (H)     Retroperitoneal abscess (H)     Peritonitis and retroperitoneal infections (H)     Delirium, acute     Pancytopenia (H)     EJ (acute kidney injury) (H)      Hyperkalemic renal tubular acidosis     Inadequate oral intake     Gangrene of finger (H)     Ischemia of both lower extremities     Low hemoglobin     SOCIAL /FAMILY HISTORY: [x]                  No recent change    Past Medical History:   Diagnosis Date     Cancer (H)      Depressive disorder, not elsewhere classified      Esophageal reflux      Fibromyalgia 1/2009    dx with Dr Benitez( [facility])     History of thrombophlebitis      Osteoarthritis      Other acute embolism veins 11/01    Deep vein thrombophlebitis, filter placed     Other and unspecified hyperlipidemia      Other chronic nonalcoholic liver disease     Fatty liver      Other testicular hypofunction      Pneumonia, organism unspecified(486) 10-01    Included ARDS, sepsis, and  acute renal failure; hospitalized     Rheumatoid arthritis(714.0)      Type II or unspecified type diabetes mellitus without mention of complication, not stated as uncontrolled 11/01    Managed by endocrinology     Unspecified essential hypertension 11/01    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     Unspecified sleep apnea     Uses BiPAP     Past Surgical History:   Procedure Laterality Date     BENCH LIVER N/A 3/4/2017    Procedure: BENCH LIVER;  Surgeon: Jovan Tran MD;  Location:  OR      NONSPECIFIC PROCEDURE      tracheostomy     C NONSPECIFIC PROCEDURE      repair of deviated septum     C NONSPECIFIC PROCEDURE  2007    Rt knee arthroscopy     C TOTAL KNEE ARTHROPLASTY  2008    Right knee arthroscopy     CHOLECYSTECTOMY       COLONOSCOPY N/A 7/21/2017    Procedure: COMBINED COLONOSCOPY, SINGLE OR MULTIPLE BIOPSY/POLYPECTOMY BY BIOPSY;  Colonoscopy;  Surgeon: Izaiah Montes MD;  Location:  GI     ESOPHAGOSCOPY, GASTROSCOPY, DUODENOSCOPY (EGD), COMBINED N/A 8/4/2016    Procedure: COMBINED ESOPHAGOSCOPY, GASTROSCOPY, DUODENOSCOPY (EGD), BIOPSY SINGLE OR MULTIPLE;  Surgeon: Trent Pederson MD;  Location:  GI     ESOPHAGOSCOPY, GASTROSCOPY,  DUODENOSCOPY (EGD), COMBINED N/A 2017    Procedure: COMBINED ESOPHAGOSCOPY, GASTROSCOPY, DUODENOSCOPY (EGD);;  Surgeon: Los Wynn MD;  Location: [facility]     LAPAROTOMY EXPLORATORY N/A 2017    Procedure: LAPAROTOMY EXPLORATORY;  Exploratory Laparotomy, washout;  Surgeon: Tip Zhang MD;  Location: [facility] OR     LAPAROTOMY EXPLORATORY N/A 2017    Procedure: LAPAROTOMY EXPLORATORY;  Exploratory Laparotomy, Washout with closure.;  Surgeon: Tip Zhang MD;  Location: [facility] OR     LAPAROTOMY EXPLORATORY N/A 2017    Procedure: LAPAROTOMY EXPLORATORY;  Exploratory Laparotomy, Right angelique-colectomy, end ileostomy, mucosal fistula, partial omentectomy;  Surgeon: Sara Dinh MD;  Location: [facility] OR     TRANSPLANT LIVER RECIPIENT  DONOR N/A 3/4/2017    Procedure: TRANSPLANT LIVER RECIPIENT  DONOR;  Surgeon: Jovan Tran MD;  Location: [facility] OR     Social History     Social History     Marital status:      Spouse name: N/A     Number of children: 1     Years of education: N/A     Occupational History            Social History Main Topics     Smoking status: Former Smoker     Smokeless tobacco: Former User     Types: Chew     Quit date: 10/8/2015      Comment: Has used chewing tobacco since age 15 , chewed for 20yrs      Alcohol use No      Comment: last drink about a year ago (quit )     Drug use: No     Sexual activity: Yes     Partners: Female     Other Topics Concern     Not on file     Social History Narrative    uSED TO BE      Back in school now         Prescription Medications as of 10/2/2017             tacrolimus (GENERIC EQUIVALENT) 1 MG capsule Take 1 capsule (1 mg) by mouth every 12 hours *take with 5 mg capsules for total of 6 mg every 12 hours    tacrolimus (GENERIC EQUIVALENT) 5 MG capsule Take 1 capsule (5 mg) by mouth every 12 hours *take with 1 mg capsules for total dose 6 mg every 12  hours    furosemide (LASIX) 20 MG tablet Take 1 tablet (20 mg) by mouth daily    sodium polystyrene (KAYEXALATE) 0.25 GM/ML suspension Take 240 mLs (60 g) by mouth once for 1 dose Avoid taking other oral medications 3 hours before or after this medication.    fludrocortisone (FLORINEF) 0.1 MG tablet Take 2 tablets (0.2 mg) by mouth daily For elevated potassium    sodium chloride, PF, 0.9% PF flush Irrigate with 20 mLs as directed 2 times daily *10 mLs per drain    Sodium Chloride Flush (SALINE FLUSH IV)     oxyCODONE (ROXICODONE) 10 MG IR tablet Take 0.5 tablets (5 mg) by mouth every 4 hours as needed for moderate to severe pain    acetaminophen (TYLENOL) 325 MG tablet Take 2 tablets (650 mg) by mouth every 4 hours as needed for mild pain    loperamide (IMODIUM) 2 MG capsule Take 2 capsules (4 mg) by mouth 2 times daily    amLODIPine (NORVASC) 5 MG tablet Take 2 tablets (10 mg) by mouth daily    miconazole with skin protectant (LEXI ANTIFUNGAL) 2 % CREA cream Apply topically 2 times daily    cyclobenzaprine (FLEXERIL) 10 MG tablet Take 1 tablet (10 mg) by mouth 3 times daily as needed for muscle spasms    pantoprazole (PROTONIX) 40 MG EC tablet Take 1 tablet (40 mg) by mouth daily    sodium bicarbonate 650 MG tablet Take 1 tablet (650 mg) by mouth 3 times daily    insulin glargine (LANTUS) 100 UNIT/ML injection Inject 5 Units Subcutaneous At Bedtime    insulin aspart (NOVOLOG FLEXPEN) 100 UNIT/ML injection Inject 1-10 Units Subcutaneous 3 times daily (with meals) Pre-Meal  - 164 give 1 unit.    - 189 give 2 units.    - 214 give 3 units.    - 239 give 4 units.    - 264 give 5 units.    - 289 give 6 units.    - 314 give 7 units.    - 339 give 8 units.    - 364 give 9 units.  BG greater than or equal to 365 give 10 units    insulin aspart (NOVOLOG FLEXPEN) 100 UNIT/ML injection Inject 1-7 Units Subcutaneous At Bedtime  - 224 give 1 units.    - 249 give 2  units.    - 274 give 3 units.    - 299 give 4 units.    - 324 give 5 units.    - 349 give 6 units.   BG greater than or equal to 350 give 7 units.    tadalafil (CIALIS) 5 MG tablet Take 1 tablet (5 mg) by mouth daily Never use with nitroglycerin, terazosin or doxazosin.    prochlorperazine (COMPAZINE) 5 MG tablet Take 1-2 tablets (5-10 mg) by mouth every 6 hours as needed for nausea or vomiting    ondansetron (ZOFRAN-ODT) 4 MG ODT tab Take 1 tablet (4 mg) by mouth every 8 hours as needed for nausea        Erythromycin and Vioxx   REVIEW OF SYSTEMS (check box if normal)  [x]               GENERAL  [x]                 PULMONARY [x]                GENITOURINARY  [x]                CNS                 [x]                 CARDIAC  [x]                 ENDOCRINE  [x]                EARS,NOSE,THROAT [x]                 GASTROINTESTINAL [x]                 NEUROLOGIC    [x]                MUSCLOSKELTAL  [x]                  HEMATOLOGY      PHYSICAL EXAM (check box if normal)/90  Pulse 91  Temp 98.1  F (36.7  C) (Oral)  Resp 16  Ht 1.829 m (6')  Wt 87.2 kg (192 lb 3.2 oz)  SpO2 98%  BMI 26.07 kg/m2        [x]            GENERAL:    [x]       EYES:  ICTERIC   []        YES  []                    NO  [x]           EXTREMITIES: Clubbing []       Y     [x]           N    [x]           EARS, NOSE, THROAT: Membranes Moist    YES   [x]                   NO []                  [x]           LUNGS:  CLEAR    YES       [x]                  NO    []                                [x]           SKIN: Jaundice           YES       []                  NO    [x]                   Rash: YES       []                  NO    [x]                                     [x]             HEART: Regular Rate          YES       [x]                  NO    []                   Incision Clean:  YES       [x]                  NO    []                                [x]                    ABDOMEN: Ileostomy healthy  Organomegaly YES       []                  NO    [x]                       [x]                    NEUROLOGICAL:  Nonfocal  YES       [x]                  NO    []                       [x]                    Hernia YES       []                  NO    [x]                   PSYCHIATRIC:  Appropriate  YES       [x]                  NO    []                       OTHER:                                                                                                   PAIN SCALE:: 3

## 2017-10-02 NOTE — NURSING NOTE
Here for post liver transplant follow-up.  Reviewed recent labs and assisted with interpretation.  Pt has mild elevation in potassium, encouraged hydration, lasix dose decreased to every other day.    Complaints: Concerned about drains,  removed the right side, small drain, provided dressings for pt, he is aware that he may have drainage from the site.    Current immunosuppression:   Tacrolimus 6mg Q12 hours     Med changes:  None made     Lab frequency:   2x/week until tacrolimus level is stable, than can be weekly.    Follow-up: RTC to see  10/16.

## 2017-10-02 NOTE — MR AVS SNAPSHOT
After Visit Summary   10/2/2017    Camacho Bhagat    MRN: [id number]           Patient Information     Date Of Birth          1965        Visit Information        Provider Department      10/2/2017 11:30 AM Jovan Tran MD [facility] Solid Organ Transplant        Today's Diagnoses     Liver transplant recipient (H)    -  1       Follow-ups after your visit        Your next 10 appointments already scheduled     Oct 11, 2017  8:15 AM CDT   (Arrive by 8:00 AM)   Return Visit with Sara Dinh MD   [facility] Colon and Rectal Surgery ([facility])    [address] [place] [address]  [address] [facility] [id number]   [phone]            Oct 16, 2017 11:30 AM CDT   (Arrive by 11:15 AM)   Liver Return Post Op with Jovan Tran MD   [facility] Solid Organ Transplant ([facility])    [address] [place] [address]  [address] [facility] [id number] [phone]            Dec 01, 2017  8:45 AM CST   (Arrive by 8:30 AM)   Return Liver Transplant with Toñito Camejo MD   [facility] Hepatology ([facility])    [address] [place] [id number] [phone]              Who to contact     If you have questions or need follow up information about today's clinic visit or your schedule please contact [facility] SOLID ORGAN TRANSPLANT directly at [phone].  Normal or non-critical lab and imaging results will be communicated to you by MyChart, letter or phone within 4 business days after the clinic has received the results. If you do not hear from us within 7 days, please contact the clinic through MyChart or phone. If you have a critical or abnormal lab result, we will notify you by phone as soon as possible.  Submit refill requests through [employer] or call your pharmacy and they will forward the refill request to us. Please allow 3 business days for your refill to be completed.          Additional  Information About Your Visit        [employer] Information     [email or web address] gives you secure access to your electronic health record. If you see a primary care provider, you can also send messages to your care team and make appointments. If you have questions, please call your primary care clinic.  If you do not have a primary care provider, please call [phone] and they will assist you.        Care EveryWhere ID     This is your Care EveryWhere ID. This could be used by other organizations to access your [facility] medical records  [id number]        Your Vitals Were     Pulse Temperature Respirations Height Pulse Oximetry BMI (Body Mass Index)    91 98.1  F (36.7  C) (Oral) 16 1.829 m (6') 98% 26.07 kg/m2       Blood Pressure from Last 3 Encounters:   10/02/17 146/90   09/22/17 155/85   09/21/17 148/89    Weight from Last 3 Encounters:   10/02/17 87.2 kg (192 lb 3.2 oz)   09/21/17 88 kg (194 lb)   09/20/17 88.1 kg (194 lb 3.2 oz)              Today, you had the following     No orders found for display         Today's Medication Changes          These changes are accurate as of: 10/2/17 11:51 AM.  If you have any questions, ask your nurse or doctor.               These medicines have changed or have updated prescriptions.        Dose/Directions    insulin glargine 100 UNIT/ML injection   Commonly known as:  LANTUS   This may have changed:    - how much to take  - when to take this   Used for:  Liver transplant recipient (H)        Dose:  5 Units   Inject 5 Units Subcutaneous At Bedtime   Quantity:  3 mL   Refills:  1                Primary Care Provider Office Phone # Fax #    Shay Kirkpatrick [phone]       [address] [facility] [address] [place] [id number]        Equal Access to Services     FRANKLIN PORTILLO : Todd Carlisle, troy gaston, devi wang. So [facility] [phone].    ATENCIÓN: Si habla español, tiene a zheng disposición  servicios gratuitos de asistencia lingüística. Shahla montemayor [phone].    We comply with applicable federal civil rights laws and [place] laws. We do not discriminate on the basis of race, color, national origin, age, disability, sex, sexual orientation, or gender identity.            Thank you!     Thank you for choosing [facility] SOLID ORGAN TRANSPLANT  for your care. Our goal is always to provide you with excellent care. Hearing back from our patients is one way we can continue to improve our services. Please take a few minutes to complete the written survey that you may receive in the mail after your visit with us. Thank you!             Your Updated Medication List - Protect others around you: Learn how to safely use, store and throw away your medicines at www.disposemymeds.org.          This list is accurate as of: 10/2/17 11:51 AM.  Always use your most recent med list.                   Brand Name Dispense Instructions for use Diagnosis    acetaminophen 325 MG tablet    TYLENOL    100 tablet    Take 2 tablets (650 mg) by mouth every 4 hours as needed for mild pain    Osteoarthritis, unspecified osteoarthritis type, unspecified site       amLODIPine 5 MG tablet    NORVASC    60 tablet    Take 2 tablets (10 mg) by mouth daily    History of liver transplant (H), Elevated blood pressure reading, Long-term use of immunosuppressant medication       cyclobenzaprine 10 MG tablet    FLEXERIL    90 tablet    Take 1 tablet (10 mg) by mouth 3 times daily as needed for muscle spasms    Immunosuppression (H)       fludrocortisone 0.1 MG tablet    FLORINEF    60 tablet    Take 2 tablets (0.2 mg) by mouth daily For elevated potassium    History of liver transplant (H), Long-term use of immunosuppressant medication       furosemide 20 MG tablet    LASIX    30 tablet    Take 1 tablet (20 mg) by mouth daily    Hyperkalemia       * insulin aspart 100 UNIT/ML injection    NovoLOG FLEXPEN    3 mL    Inject 1-10 Units Subcutaneous  3 times daily (with meals) Pre-Meal  - 164 give 1 unit.   - 189 give 2 units.   - 214 give 3 units.   - 239 give 4 units.   - 264 give 5 units.   - 289 give 6 units.   - 314 give 7 units.   - 339 give 8 units.   - 364 give 9 units. BG greater than or equal to 365 give 10 units    Type 2 diabetes mellitus with diabetic polyneuropathy, unspecified long term insulin use status (H)       * insulin aspart 100 UNIT/ML injection    NovoLOG FLEXPEN    3 mL    Inject 1-7 Units Subcutaneous At Bedtime  - 224 give 1 units.   - 249 give 2 units.   - 274 give 3 units.   - 299 give 4 units.   - 324 give 5 units.   - 349 give 6 units.  BG greater than or equal to 350 give 7 units.    Type 2 diabetes mellitus with diabetic polyneuropathy, unspecified long term insulin use status (H)       insulin glargine 100 UNIT/ML injection    LANTUS    3 mL    Inject 5 Units Subcutaneous At Bedtime    Liver transplant recipient (H)       loperamide 2 MG capsule    IMODIUM    120 capsule    Take 2 capsules (4 mg) by mouth 2 times daily    S/P right hemicolectomy       miconazole with skin protectant 2 % Crea cream     1 Tube    Apply topically 2 times daily    Liver transplant recipient (H)       ondansetron 4 MG ODT tab    ZOFRAN-ODT    30 tablet    Take 1 tablet (4 mg) by mouth every 8 hours as needed for nausea    Cirrhosis of liver with ascites, unspecified hepatic cirrhosis type (H), Nausea       oxyCODONE 10 MG IR tablet    ROXICODONE    30 tablet    Take 0.5 tablets (5 mg) by mouth every 4 hours as needed for moderate to severe pain    History of liver transplant (H)       pantoprazole 40 MG EC tablet    PROTONIX    30 tablet    Take 1 tablet (40 mg) by mouth daily    S/P liver transplant (H)       prochlorperazine 5 MG tablet    COMPAZINE    90 tablet    Take 1-2 tablets (5-10 mg) by mouth every 6 hours as needed for nausea or vomiting    Aftercare  following organ transplant, Status post liver transplantation (H)       SALINE FLUSH IV           sodium bicarbonate 650 MG tablet     90 tablet    Take 1 tablet (650 mg) by mouth 3 times daily    Liver transplant recipient (H)       sodium chloride (PF) 0.9% PF flush     280 mL    Irrigate with 20 mLs as directed 2 times daily *10 mLs per drain    Intra-abdominal abscess (H)       sodium polystyrene 0.25 GM/ML suspension    KAYEXALATE    240 mL    Take 240 mLs (60 g) by mouth once for 1 dose Avoid taking other oral medications 3 hours before or after this medication.        * tacrolimus 1 MG capsule    GENERIC EQUIVALENT    60 capsule    Take 1 capsule (1 mg) by mouth every 12 hours *take with 5 mg capsules for total of 6 mg every 12 hours    Liver transplant recipient (H)       * tacrolimus 5 MG capsule    GENERIC EQUIVALENT    60 capsule    Take 1 capsule (5 mg) by mouth every 12 hours *take with 1 mg capsules for total dose 6 mg every 12 hours    Liver transplant recipient (H)       tadalafil 5 MG tablet    CIALIS    30 tablet    Take 1 tablet (5 mg) by mouth daily Never use with nitroglycerin, terazosin or doxazosin.    Drug-induced erectile dysfunction       * Notice:  This list has 4 medication(s) that are the same as other medications prescribed for you. Read the directions carefully, and ask your doctor or other care provider to review them with you.

## 2017-10-02 NOTE — LETTER
10/2/2017       RE: Camacho Bhagat  [address] [facility] [id number]     Dear Colleague,    Thank you for referring your patient, Camacho Bhagat, to the [facility] SOLID ORGAN TRANSPLANT at [facility]. Please see a copy of my visit note below.    HPI      ROS      Physical Exam    Transplant Surgery -OUTPATIENT IMMUNOSUPPRESSION PROGRESS NOTE    Date of Visit: 10/02/2017    Transplants:  3/4/2017 (Liver); Postoperative day:  212  ASSESMENT AND PLAN:  1.Graft Function: Liver allograft: no rejection or technical problems.    2.Immunosuppression Management: keep tacrolimus levels at 8  3.Hypertension: ok  4.Renal Function: elevated creatinine to 1.6  5.Lab frequency: weekly  6.Other:  Lasix qod  Drain on the right side removed    Date: October 2, 2017    Transplant:  [x]                             Liver [x]                              Kidney []                             Pancreas []                              Other:             Chief Complaint:Liver Transplant ()  doing well, eating better, drain on right side not much  History of Present Illness:  Patient Active Problem List   Diagnosis     Carpal tunnel syndrome     Essential hypertension     Personal history of thrombophlebitis     Esophageal reflux     Depressive disorder, not elsewhere classified     Hyperlipidemia     Sleep apnea     Testicular hypofunction     HYPERLIPIDEMIA LDL GOAL <100     Fibromyalgia     OA (osteoarthritis)     Sicca syndrome (H)     Knee pain     Primary localized osteoarthrosis, lower leg     Diabetes mellitus with neurological manifestation (H)     Medication refill- do not delete      Pain medication agreement signed - Shay Kirkpatrick 2/7/14     Hepatocellular carcinoma (H)     Uncontrolled type 2 diabetes mellitus with hyperglycemia, with long-term current use of insulin (H)     Osteoarthritis     Rheumatoid arthritis (H)     Hyperkalemia     Liver transplant recipient (H)      Immunosuppressed status (H)     Neck pain     Back pain     Sepsis (H)     Typhlitis     Neutropenic colitis (H)     S/P liver transplant (H)     Retroperitoneal abscess (H)     Peritonitis and retroperitoneal infections (H)     Delirium, acute     Pancytopenia (H)     EJ (acute kidney injury) (H)     Hyperkalemic renal tubular acidosis     Inadequate oral intake     Gangrene of finger (H)     Ischemia of both lower extremities     Low hemoglobin     SOCIAL /FAMILY HISTORY: [x]                  No recent change    Past Medical History:   Diagnosis Date     Cancer (H)      Depressive disorder, not elsewhere classified      Esophageal reflux      Fibromyalgia 1/2009    dx with Dr Benitez( Rhe[facility])     History of thrombophlebitis      Osteoarthritis      Other acute embolism veins 11/01    Deep vein thrombophlebitis, filter placed     Other and unspecified hyperlipidemia      Other chronic nonalcoholic liver disease     Fatty liver      Other testicular hypofunction      Pneumonia, organism [phone] 10-01    Included ARDS, sepsis, and  acute renal failure; hospitalized     Rheumatoid arthritis(714.0)      Type II or unspecified type diabetes mellitus without mention of complication, not stated as uncontrolled 11/01    Managed by endocrinology     Unspecified essential hypertension 11/01    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     Unspecified sleep apnea     Uses BiPAP     Past Surgical History:   Procedure Laterality Date     BENCH LIVER N/A 3/4/2017    Procedure: BENCH LIVER;  Surgeon: Jovan Tran MD;  Location: [facility] OR     C NONSPECIFIC PROCEDURE      tracheostomy     C NONSPECIFIC PROCEDURE      repair of deviated septum     C NONSPECIFIC PROCEDURE  2007    Rt knee arthroscopy     C TOTAL KNEE ARTHROPLASTY  2008    Right knee arthroscopy     CHOLECYSTECTOMY       COLONOSCOPY N/A 7/21/2017    Procedure: COMBINED COLONOSCOPY, SINGLE OR MULTIPLE BIOPSY/POLYPECTOMY BY BIOPSY;  Colonoscopy;   Surgeon: Izaiah Montes MD;  Location: [facility] GI     ESOPHAGOSCOPY, GASTROSCOPY, DUODENOSCOPY (EGD), COMBINED N/A 2016    Procedure: COMBINED ESOPHAGOSCOPY, GASTROSCOPY, DUODENOSCOPY (EGD), BIOPSY SINGLE OR MULTIPLE;  Surgeon: Trent Pederson MD;  Location:  GI     ESOPHAGOSCOPY, GASTROSCOPY, DUODENOSCOPY (EGD), COMBINED N/A 2017    Procedure: COMBINED ESOPHAGOSCOPY, GASTROSCOPY, DUODENOSCOPY (EGD);;  Surgeon: Los Wynn MD;  Location: [facility] GI     LAPAROTOMY EXPLORATORY N/A 2017    Procedure: LAPAROTOMY EXPLORATORY;  Exploratory Laparotomy, washout;  Surgeon: iTp Zhang MD;  Location: [facility] OR     LAPAROTOMY EXPLORATORY N/A 2017    Procedure: LAPAROTOMY EXPLORATORY;  Exploratory Laparotomy, Washout with closure.;  Surgeon: Tip Zhang MD;  Location: [facility] OR     LAPAROTOMY EXPLORATORY N/A 2017    Procedure: LAPAROTOMY EXPLORATORY;  Exploratory Laparotomy, Right angelique-colectomy, end ileostomy, mucosal fistula, partial omentectomy;  Surgeon: Sara Dinh MD;  Location: [facility] OR     TRANSPLANT LIVER RECIPIENT  DONOR N/A 3/4/2017    Procedure: TRANSPLANT LIVER RECIPIENT  DONOR;  Surgeon: Jovan Tran MD;  Location: [facility] OR     Social History     Social History     Marital status:      Spouse name: N/A     Number of children: 1     Years of education: N/A     Occupational History            Social History Main Topics     Smoking status: Former Smoker     Smokeless tobacco: Former User     Types: Chew     Quit date: 10/8/2015      Comment: Has used chewing tobacco since age 15 , chewed for 20yrs      Alcohol use No      Comment: last drink about a year ago (quit )     Drug use: No     Sexual activity: Yes     Partners: Female     Other Topics Concern     Not on file     Social History Narrative    uSED TO [employer]      Back in school now         Prescription Medications as of 10/2/2017              tacrolimus (GENERIC EQUIVALENT) 1 MG capsule Take 1 capsule (1 mg) by mouth every 12 hours *take with 5 mg capsules for total of 6 mg every 12 hours    tacrolimus (GENERIC EQUIVALENT) 5 MG capsule Take 1 capsule (5 mg) by mouth every 12 hours *take with 1 mg capsules for total dose 6 mg every 12 hours    furosemide (LASIX) 20 MG tablet Take 1 tablet (20 mg) by mouth daily    sodium polystyrene (KAYEXALATE) 0.25 GM/ML suspension Take 240 mLs (60 g) by mouth once for 1 dose Avoid taking other oral medications 3 hours before or after this medication.    fludrocortisone (FLORINEF) 0.1 MG tablet Take 2 tablets (0.2 mg) by mouth daily For elevated potassium    sodium chloride, PF, 0.9% PF flush Irrigate with 20 mLs as directed 2 times daily *10 mLs per drain    Sodium Chloride Flush (SALINE FLUSH IV)     oxyCODONE (ROXICODONE) 10 MG IR tablet Take 0.5 tablets (5 mg) by mouth every 4 hours as needed for moderate to severe pain    acetaminophen (TYLENOL) 325 MG tablet Take 2 tablets (650 mg) by mouth every 4 hours as needed for mild pain    loperamide (IMODIUM) 2 MG capsule Take 2 capsules (4 mg) by mouth 2 times daily    amLODIPine (NORVASC) 5 MG tablet Take 2 tablets (10 mg) by mouth daily    miconazole with skin protectant (LEXI ANTIFUNGAL) 2 % CREA cream Apply topically 2 times daily    cyclobenzaprine (FLEXERIL) 10 MG tablet Take 1 tablet (10 mg) by mouth 3 times daily as needed for muscle spasms    pantoprazole (PROTONIX) 40 MG EC tablet Take 1 tablet (40 mg) by mouth daily    sodium bicarbonate 650 MG tablet Take 1 tablet (650 mg) by mouth 3 times daily    insulin glargine (LANTUS) 100 UNIT/ML injection Inject 5 Units Subcutaneous At Bedtime    insulin aspart (NOVOLOG FLEXPEN) 100 UNIT/ML injection Inject 1-10 Units Subcutaneous 3 times daily (with meals) Pre-Meal  - 164 give 1 unit.    - 189 give 2 units.    - 214 give 3 units.    - 239 give 4 units.    - 264 give 5 units.   BG  265 - 289 give 6 units.    - 314 give 7 units.    - 339 give 8 units.    - 364 give 9 units.  BG greater than or equal to 365 give 10 units    insulin aspart (NOVOLOG FLEXPEN) 100 UNIT/ML injection Inject 1-7 Units Subcutaneous At Bedtime  - 224 give 1 units.    - 249 give 2 units.    - 274 give 3 units.    - 299 give 4 units.    - 324 give 5 units.    - 349 give 6 units.   BG greater than or equal to 350 give 7 units.    tadalafil (CIALIS) 5 MG tablet Take 1 tablet (5 mg) by mouth daily Never use with nitroglycerin, terazosin or doxazosin.    prochlorperazine (COMPAZINE) 5 MG tablet Take 1-2 tablets (5-10 mg) by mouth every 6 hours as needed for nausea or vomiting    ondansetron (ZOFRAN-ODT) 4 MG ODT tab Take 1 tablet (4 mg) by mouth every 8 hours as needed for nausea        Erythromycin and Vioxx   REVIEW OF SYSTEMS (check box if normal)  [x]               GENERAL  [x]                 PULMONARY [x]                GENITOURINARY  [x]                CNS                 [x]                 CARDIAC  [x]                 ENDOCRINE  [x]                EARS,NOSE,THROAT [x]                 GASTROINTESTINAL [x]                 NEUROLOGIC    [x]                MUSCLOSKELTAL  [x]                  HEMATOLOGY      PHYSICAL EXAM (check box if normal)/90  Pulse 91  Temp 98.1  F (36.7  C) (Oral)  Resp 16  Ht 1.829 m (6')  Wt 87.2 kg (192 lb 3.2 oz)  SpO2 98%  BMI 26.07 kg/m2        [x]            GENERAL:    [x]       EYES:  ICTERIC   []        YES  []                    NO  [x]           EXTREMITIES: Clubbing []       Y     [x]           N    [x]           EARS, NOSE, THROAT: Membranes Moist    YES   [x]                   NO []                  [x]           LUNGS:  CLEAR    YES       [x]                  NO    []                                [x]           SKIN: Jaundice           YES       []                  NO    [x]                   Rash: YES       []                   NO    [x]                                     [x]             HEART: Regular Rate          YES       [x]                  NO    []                   Incision Clean:  YES       [x]                  NO    []                                [x]                    ABDOMEN: Ileostomy healthy Organomegaly YES       []                  NO    [x]                       [x]                    NEUROLOGICAL:  Nonfocal  YES       [x]                  NO    []                       [x]                    Hernia YES       []                  NO    [x]                   PSYCHIATRIC:  Appropriate  YES       [x]                  NO    []                       OTHER:                                                                                                   PAIN SCALE:: 3    Again, thank you for allowing me to participate in the care of your patient.      Sincerely,    Jovan Tran MD

## 2017-10-03 ENCOUNTER — TELEPHONE (OUTPATIENT)
Dept: TRANSPLANT | Facility: CLINIC | Age: 52
End: 2017-10-03

## 2017-10-03 DIAGNOSIS — K65.1 INTRA-ABDOMINAL ABSCESS (H): ICD-10-CM

## 2017-10-03 NOTE — TELEPHONE ENCOUNTER
Spoke w/ Humaira, Camacho's mom about refill for NS flush syringes, this was sent to [facility].  She reports that Camacho missed his dose of tacrolimus on Sunday morning, tacrolimus level Monday was < 3.0.  Reviewed current dose 6mg Q 12 hours,  Potassium 5.6, and decrease lasix to every other day.   Pt to increase hydration, avoid high potassium foods, and drinks.   Humaira verbalized understanding of doses, with plan to repeat labs on Thursday 10/05.  She requests a call to Camacho about meds, and labs to be repeated.

## 2017-10-03 NOTE — TELEPHONE ENCOUNTER
Phone call to Camacho, left a message for him stating that his tacrolimus level on Monday was too low, reminding him of dose : 6mg Q 12 hours, request that he repeat labs on Thursday, and increase hydration, avoid high potassium foods, lasix every other day, not daily.  Requested he call back to confirm tacrolimus dose, and plan for repeat labs Thursday.

## 2017-10-04 ENCOUNTER — TELEPHONE (OUTPATIENT)
Dept: TRANSPLANT | Facility: CLINIC | Age: 52
End: 2017-10-04

## 2017-10-04 NOTE — TELEPHONE ENCOUNTER
Call from Camacho to clarify tacrolimus dose.   He is currently taking 5mg Q 12 hours, using the 5mg capsule.   Did tell him that a message was left for him to increase the dose to 6mg Q 12hours, reviewed current level, too low and importance of maintaining desired level so his liver is not affected.   Camacho verbalizes understanding of dose change of tacrolimus to 6mg Q 12 hours, and plan for repeat labs Thursday.

## 2017-10-04 NOTE — TELEPHONE ENCOUNTER
Message left by mom, to report that Camacho set up his own med box, with tacrolimus dose of 5mg Q 12 hours,  Did not increase dose to 6mg Q 12 hours.   Mom is concerned but requests that our office not report her calls to transplant.  Camacho has been upset with her, wants to be independent.

## 2017-10-05 ENCOUNTER — HOSPITAL ENCOUNTER (OUTPATIENT)
Dept: LAB | Facility: CLINIC | Age: 52
Discharge: HOME | End: 2017-10-05
Attending: INTERNAL MEDICINE | Admitting: INTERNAL MEDICINE
Payer: COMMERCIAL

## 2017-10-05 DIAGNOSIS — Z94.4 HISTORY OF LIVER TRANSPLANT (H): ICD-10-CM

## 2017-10-05 DIAGNOSIS — Z79.60 LONG-TERM USE OF IMMUNOSUPPRESSANT MEDICATION: ICD-10-CM

## 2017-10-05 DIAGNOSIS — D72.819 DECREASED WHITE BLOOD CELL COUNT: ICD-10-CM

## 2017-10-05 LAB
ALBUMIN SERPL-MCNC: 2.9 G/DL (ref 3.4–5)
ALP SERPL-CCNC: 192 U/L (ref 40–150)
ALT SERPL W P-5'-P-CCNC: 19 U/L (ref 0–70)
ANION GAP SERPL CALCULATED.3IONS-SCNC: 6 MMOL/L (ref 3–14)
AST SERPL W P-5'-P-CCNC: 20 U/L (ref 0–45)
BASOPHILS # BLD AUTO: 0 10E9/L (ref 0–0.2)
BASOPHILS NFR BLD AUTO: 0.3 %
BILIRUB DIRECT SERPL-MCNC: 0.2 MG/DL (ref 0–0.2)
BILIRUB SERPL-MCNC: 0.5 MG/DL (ref 0.2–1.3)
BUN SERPL-MCNC: 36 MG/DL (ref 7–30)
CALCIUM SERPL-MCNC: 8.3 MG/DL (ref 8.5–10.1)
CHLORIDE SERPL-SCNC: 103 MMOL/L (ref 94–109)
CO2 SERPL-SCNC: 23 MMOL/L (ref 20–32)
CREAT SERPL-MCNC: 1.51 MG/DL (ref 0.66–1.25)
DIFFERENTIAL METHOD BLD: ABNORMAL
EOSINOPHIL # BLD AUTO: 0.1 10E9/L (ref 0–0.7)
EOSINOPHIL NFR BLD AUTO: 3.7 %
ERYTHROCYTE [DISTWIDTH] IN BLOOD BY AUTOMATED COUNT: 16.9 % (ref 10–15)
GFR SERPL CREATININE-BSD FRML MDRD: 49 ML/MIN/1.7M2
GLUCOSE SERPL-MCNC: 269 MG/DL (ref 70–99)
HCT VFR BLD AUTO: 21.2 % (ref 40–53)
HGB BLD-MCNC: 7.2 G/DL (ref 13.3–17.7)
IMM GRANULOCYTES # BLD: 0 10E9/L (ref 0–0.4)
IMM GRANULOCYTES NFR BLD: 0.3 %
LYMPHOCYTES # BLD AUTO: 1.7 10E9/L (ref 0.8–5.3)
LYMPHOCYTES NFR BLD AUTO: 47.7 %
MAGNESIUM SERPL-MCNC: 1.6 MG/DL (ref 1.6–2.3)
MCH RBC QN AUTO: 31.6 PG (ref 26.5–33)
MCHC RBC AUTO-ENTMCNC: 34 G/DL (ref 31.5–36.5)
MCV RBC AUTO: 93 FL (ref 78–100)
MONOCYTES # BLD AUTO: 0.2 10E9/L (ref 0–1.3)
MONOCYTES NFR BLD AUTO: 6.3 %
NEUTROPHILS # BLD AUTO: 1.5 10E9/L (ref 1.6–8.3)
NEUTROPHILS NFR BLD AUTO: 41.7 %
NRBC # BLD AUTO: 0 10*3/UL
NRBC BLD AUTO-RTO: 0 /100
PHOSPHATE SERPL-MCNC: 3.8 MG/DL (ref 2.5–4.5)
PLATELET # BLD AUTO: 70 10E9/L (ref 150–450)
POTASSIUM SERPL-SCNC: 5.5 MMOL/L (ref 3.4–5.3)
PROT SERPL-MCNC: 7.8 G/DL (ref 6.8–8.8)
RBC # BLD AUTO: 2.28 10E12/L (ref 4.4–5.9)
SODIUM SERPL-SCNC: 132 MMOL/L (ref 133–144)
WBC # BLD AUTO: 3.5 10E9/L (ref 4–11)

## 2017-10-05 PROCEDURE — 36415 COLL VENOUS BLD VENIPUNCTURE: CPT | Performed by: INTERNAL MEDICINE

## 2017-10-05 PROCEDURE — 80048 BASIC METABOLIC PNL TOTAL CA: CPT | Performed by: INTERNAL MEDICINE

## 2017-10-05 PROCEDURE — 80076 HEPATIC FUNCTION PANEL: CPT | Performed by: INTERNAL MEDICINE

## 2017-10-05 PROCEDURE — 83735 ASSAY OF MAGNESIUM: CPT | Performed by: INTERNAL MEDICINE

## 2017-10-05 PROCEDURE — 85004 AUTOMATED DIFF WBC COUNT: CPT | Performed by: INTERNAL MEDICINE

## 2017-10-05 PROCEDURE — 85027 COMPLETE CBC AUTOMATED: CPT | Performed by: INTERNAL MEDICINE

## 2017-10-05 PROCEDURE — 80197 ASSAY OF TACROLIMUS: CPT | Performed by: INTERNAL MEDICINE

## 2017-10-05 PROCEDURE — 84100 ASSAY OF PHOSPHORUS: CPT | Performed by: INTERNAL MEDICINE

## 2017-10-06 LAB
TACROLIMUS BLD-MCNC: 4.5 UG/L (ref 5–15)
TME LAST DOSE: ABNORMAL H

## 2017-10-11 ENCOUNTER — OFFICE VISIT (OUTPATIENT)
Dept: SURGERY | Facility: CLINIC | Age: 52
End: 2017-10-11

## 2017-10-11 ENCOUNTER — OFFICE VISIT (OUTPATIENT)
Dept: WOUND CARE | Facility: CLINIC | Age: 52
End: 2017-10-11

## 2017-10-11 VITALS
OXYGEN SATURATION: 95 % | DIASTOLIC BLOOD PRESSURE: 79 MMHG | HEIGHT: 72 IN | BODY MASS INDEX: 25.44 KG/M2 | HEART RATE: 90 BPM | SYSTOLIC BLOOD PRESSURE: 152 MMHG | TEMPERATURE: 98.3 F | WEIGHT: 187.8 LBS

## 2017-10-11 DIAGNOSIS — Z71.89 OSTOMY NURSE CONSULTATION: Primary | ICD-10-CM

## 2017-10-11 DIAGNOSIS — Z09 FOLLOW-UP EXAMINATION FOLLOWING SURGERY: Primary | ICD-10-CM

## 2017-10-11 ASSESSMENT — PAIN SCALES - GENERAL: PAINLEVEL: SEVERE PAIN (6)

## 2017-10-11 NOTE — LETTER
10/11/2017       RE: Camacho Bhagat  [address] [facility] [address]     Dear Colleague,    Thank you for referring your patient, Camacho Bhagat, to the [facility] COLON AND RECTAL SURGERY at [facility]. Please see a copy of my visit note below.    Colon and Rectal Surgery Clinic Note    Referring provider:  LAMIN Leblanc CNP  [address] [place] [address]       RE: Camacho Bhagat  : 1965  BISI: 10/11/2017      Camacho Bhagat is a very pleasant 52 year old male status post liver transplantation here for follow-up of after exploratory laparotomy, lysis of adhesions, right hemicolectomy, end ileostomy, mucous fistula, and partial omentectomy, 2017 for retroperitoneal pericolonic air with sepsis after a recent colonoscopy with biopsies and associated colitis.     Interval history: The patient is now at rehabilitation because he still has 2 abdominal drains. He is able to care for his stoma for the most part. He is anxious to have reversal of his stoma. His appetite is good and he is getting around well. Outputs from the drains are serosanguinous and minimal.    PLEASE SEE NOTE BELOW FOR PHYSICAL EXAMINATION, REVIEW OF SYSTEMS, AND OTHER HISTORY.    Assessment/Plan: 52 year old male status post liver transplantation here for follow-up of after exploratory laparotomy, lysis of adhesions, right hemicolectomy, end ileostomy, mucous fistula, and partial omentectomy, 2017 for retroperitoneal pericolonic air with sepsis after a recent colonoscopy with biopsies and associated colitis. Overall very improved.      1. IR drains remain times two. We will arrange follow-up with IR for sinogram. And then possible removal.     2. Recovery is good. He would like to see wound care ostomy nursing which we will arrange as he is interested in something protective over his appliance so his dog will not traumatize the stoma.     3. He wishes for reversal soon. I  have explained that we would recommend not until 6 months postoperatively. He had very significant adhesions and inflammation at the time of surgery. He still has drains in place. Although he is doing well, the ideal is to wait for the 6 months. We could pencil in a date in late January or early February -    Procedure: Exploratory laparotomy, lysis of adhesions, takedown end ileostomy and mucous fistula with ileocolic anastomosis.    No bowel preparation, operating room position: supine, to Preoperative Assessment Center (PAC) prior to surgery. No other preoperative diagnostic imaging.     This is a postoperative visit. Total time was 25 minutes, over 50% was spent counseling the patient.       PLEASE SEE NOTE BELOW FOR PHYSICAL EXAMINATION, REVIEW OF SYSTEMS, AND OTHER HISTORY.      Sara Dinh MD  Colon and Rectal Surgery Attending  Department of Surgery  [facility]    -------------------------------------------------------------------------------------------------------------------    Medical history:  Past Medical History:   Diagnosis Date     Cancer (H)      Depressive disorder, not elsewhere classified      Esophageal reflux      Fibromyalgia 1/2009    dx with Dr Benitez( Rheum)     History of thrombophlebitis      Osteoarthritis      Other acute embolism veins 11/01    Deep vein thrombophlebitis, filter placed     Other and unspecified hyperlipidemia      Other chronic nonalcoholic liver disease     Fatty liver      Other testicular hypofunction      Pneumonia, organism unspecified(486) 10-01    Included ARDS, sepsis, and  acute renal failure; hospitalized     Rheumatoid arthritis(714.0)      Type II or unspecified type diabetes mellitus without mention of complication, not stated as uncontrolled 11/01    Managed by endocrinology     Unspecified essential hypertension 11/01    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     Unspecified sleep apnea     Uses BiPAP        Surgical history:  Past Surgical History:   Procedure Laterality Date     BENCH LIVER N/A 3/4/2017    Procedure: BENCH LIVER;  Surgeon: Jovan Tran MD;  Location: [facility] OR     C NONSPECIFIC PROCEDURE      tracheostomy     C NONSPECIFIC PROCEDURE      repair of deviated septum     C NONSPECIFIC PROCEDURE      Rt knee arthroscopy     C TOTAL KNEE ARTHROPLASTY  2008    Right knee arthroscopy     CHOLECYSTECTOMY       COLONOSCOPY N/A 2017    Procedure: COMBINED COLONOSCOPY, SINGLE OR MULTIPLE BIOPSY/POLYPECTOMY BY BIOPSY;  Colonoscopy;  Surgeon: Izaiah Montes MD;  Location: [facility] GI     ESOPHAGOSCOPY, GASTROSCOPY, DUODENOSCOPY (EGD), COMBINED N/A 2016    Procedure: COMBINED ESOPHAGOSCOPY, GASTROSCOPY, DUODENOSCOPY (EGD), BIOPSY SINGLE OR MULTIPLE;  Surgeon: Trent Pederson MD;  Location:  GI     ESOPHAGOSCOPY, GASTROSCOPY, DUODENOSCOPY (EGD), COMBINED N/A 2017    Procedure: COMBINED ESOPHAGOSCOPY, GASTROSCOPY, DUODENOSCOPY (EGD);;  Surgeon: Los Wynn MD;  Location: [facility] GI     LAPAROTOMY EXPLORATORY N/A 2017    Procedure: LAPAROTOMY EXPLORATORY;  Exploratory Laparotomy, washout;  Surgeon: Tip Zhang MD;  Location: [facility] OR     LAPAROTOMY EXPLORATORY N/A 2017    Procedure: LAPAROTOMY EXPLORATORY;  Exploratory Laparotomy, Washout with closure.;  Surgeon: Tip Zhang MD;  Location: [facility] OR     LAPAROTOMY EXPLORATORY N/A 2017    Procedure: LAPAROTOMY EXPLORATORY;  Exploratory Laparotomy, Right angelique-colectomy, end ileostomy, mucosal fistula, partial omentectomy;  Surgeon: Sara Dinh MD;  Location: [facility] OR     TRANSPLANT LIVER RECIPIENT  DONOR N/A 3/4/2017    Procedure: TRANSPLANT LIVER RECIPIENT  DONOR;  Surgeon: Jovan rTan MD;  Location: [facility] OR       Problem list:  Patient Active Problem List    Diagnosis Date Noted     Low hemoglobin 2017     Priority: Medium     Retroperitoneal abscess (H)  08/25/2017     Priority: Medium     Peritonitis and retroperitoneal infections (H) 08/25/2017     Priority: Medium     Delirium, acute 08/25/2017     Priority: Medium     Pancytopenia (H) 08/25/2017     Priority: Medium     EJ (acute kidney injury) (H) 08/25/2017     Priority: Medium     Hyperkalemic renal tubular acidosis 08/25/2017     Priority: Medium     Inadequate oral intake 08/25/2017     Priority: Medium     Gangrene of finger (H) 08/25/2017     Priority: Medium     Ischemia of both lower extremities 08/25/2017     Priority: Medium     Distal ischemia due to shock/high pressor requirements       S/P liver transplant (H) 07/26/2017     Priority: Medium     Sepsis (H) 07/04/2017     Priority: Medium     Typhlitis 07/04/2017     Priority: Medium     Neutropenic colitis (H) 07/04/2017     Priority: Medium     Neck pain 05/03/2017     Priority: Medium     Back pain 05/03/2017     Priority: Medium     Immunosuppressed status (H) 03/10/2017     Priority: Medium     Liver transplant recipient (H) 03/04/2017     Priority: Medium     Hyperkalemia 02/14/2017     Priority: Medium     Uncontrolled type 2 diabetes mellitus with hyperglycemia, with long-term current use of insulin (H) 11/10/2016     Priority: Medium     Hepatocellular carcinoma (H) 01/27/2016     Priority: Medium     Osteoarthritis 01/18/2015     Priority: Medium     Rheumatoid arthritis (H) 01/18/2015     Priority: Medium     Pain medication agreement signed - Shay Sick 2/7/14 02/07/2014     Priority: Medium     Medication refill- do not delete  11/13/2013     Priority: Medium     Diabetes mellitus with neurological manifestation (H) 09/07/2012     Priority: Medium     Knee pain 09/16/2011     Priority: Medium     Primary localized osteoarthrosis, lower leg 09/16/2011     Priority: Medium     Fibromyalgia 08/15/2011     Priority: Medium     OA (osteoarthritis) 08/15/2011     Priority: Medium     Sicca syndrome (H) 08/15/2011     Priority: Medium      HYPERLIPIDEMIA LDL GOAL <100 10/31/2010     Priority: Medium     Testicular hypofunction      Priority: Medium     Problem list name updated by automated process. Provider to review       Sleep apnea      Priority: Medium     Uses BiPAP  Problem list name updated by automated process. Provider to review       Hyperlipidemia 10/10/2005     Priority: Medium     Problem list name updated by automated process. Provider to review       Depressive disorder, not elsewhere classified 05/29/2003     Priority: Medium     Esophageal reflux 10/08/2002     Priority: Medium     Carpal tunnel syndrome 07/08/2002     Priority: Medium     Essential hypertension 07/08/2002     Priority: Medium     Problem list name updated by automated process. Provider to review       Personal history of thrombophlebitis 07/08/2002     Priority: Medium       Medications:  Current Outpatient Prescriptions   Medication Sig Dispense Refill     sodium chloride, PF, 0.9% PF flush Flush 12 french drain with 10mls NS BID, flush 24 Fr drain with 20mls NS  mL 3     furosemide (LASIX) 20 MG tablet Take 1 tablet (20 mg) by mouth daily 30 tablet 3     sodium polystyrene (KAYEXALATE) 0.25 GM/ML suspension Take 240 mLs (60 g) by mouth once for 1 dose Avoid taking other oral medications 3 hours before or after this medication. 240 mL 0     fludrocortisone (FLORINEF) 0.1 MG tablet Take 2 tablets (0.2 mg) by mouth daily For elevated potassium 60 tablet 3     oxyCODONE (ROXICODONE) 10 MG IR tablet Take 0.5 tablets (5 mg) by mouth every 4 hours as needed for moderate to severe pain 30 tablet 0     acetaminophen (TYLENOL) 325 MG tablet Take 2 tablets (650 mg) by mouth every 4 hours as needed for mild pain 100 tablet 3     loperamide (IMODIUM) 2 MG capsule Take 2 capsules (4 mg) by mouth 2 times daily 120 capsule 3     amLODIPine (NORVASC) 5 MG tablet Take 2 tablets (10 mg) by mouth daily 60 tablet 3     cyclobenzaprine (FLEXERIL) 10 MG tablet Take 1 tablet (10  mg) by mouth 3 times daily as needed for muscle spasms 90 tablet 0     pantoprazole (PROTONIX) 40 MG EC tablet Take 1 tablet (40 mg) by mouth daily 30 tablet 5     sodium bicarbonate 650 MG tablet Take 1 tablet (650 mg) by mouth 3 times daily 90 tablet 5     tadalafil (CIALIS) 5 MG tablet Take 1 tablet (5 mg) by mouth daily Never use with nitroglycerin, terazosin or doxazosin. 30 tablet 11     GABAPENTIN PO Take 300 mg by mouth 3 times daily       tacrolimus (GENERIC EQUIVALENT) 5 MG capsule Take 1 capsule (5 mg) by mouth every 12 hours *take with 2 mg capsules for total dose 7 mg every 12 hours 60 capsule 11     tacrolimus (GENERIC EQUIVALENT) 1 MG capsule Take 2 capsules (2 mg) by mouth every 12 hours *take with 5 mg capsules for total of 7 mg every 12 hours 60 capsule 11     insulin glargine (LANTUS) 100 UNIT/ML injection Inject 50 Units Subcutaneous every morning       linagliptin (TRADJENTA) 5 MG TABS tablet Take 5 mg by mouth daily         Allergies:  Allergies   Allergen Reactions     Erythromycin GI Disturbance     Vioxx      Nausea, vomiting       Family history:  Family History   Problem Relation Age of Onset     DIABETES Father      Hypertension Father      Substance Abuse Father      Arthritis Mother      CANCER Mother      Thyroid     Thyroid Disease Mother      Other Cancer Mother      Colon Cancer No family hx of      Hyperlipidemia No family hx of      Coronary Artery Disease No family hx of      CEREBROVASCULAR DISEASE No family hx of      Breast Cancer No family hx of      Prostate Cancer No family hx of        Social history:  Social History     Social History     Marital status:      Spouse name: N/A     Number of children: 1     Years of education: N/A     Occupational History            Social History Main Topics     Smoking status: Former Smoker     Smokeless tobacco: Former User     Types: Chew     Quit date: 10/8/2015      Comment: Has used chewing tobacco since age  15 , chewed for 20yrs      Alcohol use No      Comment: last drink about a year ago (quit 2001)     Drug use: No     Sexual activity: Yes     Partners: Female     Other Topics Concern     Not on file     Social History Narrative    uSED TO BE      Back in school now             Nursing Notes:   Joselin Stovall RN  10/11/2017  8:30 AM  Signed  Chief Complaint   Patient presents with     RECHECK     post op surgery 7/26/17       Vitals:    10/11/17 0822   BP: 152/79   BP Location: Right arm   Patient Position: Chair   Cuff Size: Adult Regular   Pulse: 90   Temp: 98.3  F (36.8  C)   TempSrc: Oral   SpO2: 95%   Weight: 187 lb 12.8 oz   Height: 6'       Body mass index is 25.47 kg/(m^2).      Joselin Stovall RN                           Physical Examination:  /79 (BP Location: Right arm, Patient Position: Chair, Cuff Size: Adult Regular)  Pulse 90  Temp 98.3  F (36.8  C) (Oral)  Ht 6'  Wt 187 lb 12.8 oz  SpO2 95%  BMI 25.47 kg/m2  General: Pleasant, no acute distress  Abdomen: Soft, nontender, nondistended. No hepatosplenomegaly, no masses.  Well healed midline and end ileostomy. Mucous fistula in superior aspect of midline. 2 IR drains with serosanguinous fluid.      Again, thank you for allowing me to participate in the care of your patient.      Sincerely,    Sara Dinh MD

## 2017-10-11 NOTE — PROGRESS NOTES
[facility] Ostomy Assessment  Patient comes to clinic for consultation regarding ostomy issues.    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 Pouch working well.    Intervention/Plan: Removal of pouch, Preparation of new pouch, Cutting out or evaluating a pattern, Applying ring, Applying appliance to abdomen and Peristomal skin care. Patient expressed interest in using another pouch, suggested Coloplast extended wear, flat cut to fit [id number] with Ana Rosa ring [id number]. Teaching regarding emptying pouch when 1/3 to 1/2 full. Also suggested brava elastic barrier strips [id number]  rather than tape if he wants extra adhesion around the outside of the pouch barrier.    Dr. Dinh saw patient and was available for supervision of care if needed or if questions should arise and regarding plan of care.     Ronaldo Burkett RN CWON

## 2017-10-11 NOTE — MR AVS SNAPSHOT
After Visit Summary   10/11/2017    Camacho Bhagat    MRN: [id number]           Patient Information     Date Of Birth          1965        Visit Information        Provider Department      10/11/2017 8:15 AM Sara Dinh MD [facility] Colon and Rectal Surgery         Follow-ups after your visit        Your next 10 appointments already scheduled     Oct 16, 2017 11:30 AM CDT   (Arrive by 11:15 AM)   Liver Return Post Op with Jovan Tran MD   [facility] Solid Organ Transplant ([facility])    [address] [place] [address]  3rd [facility] [address]   [phone]            Dec 01, 2017  8:45 AM CST   (Arrive by 8:30 AM)   Return Liver Transplant with Toñito Camejo MD   [facility] Hepatology ([facility])    [address] [place] [id number] [phone]            Dec 13, 2017 10:30 AM CST   (Arrive by 10:15 AM)   Return Visit with Sara Dinh MD   [facility] Colon and Rectal Surgery ([facility])    [address] [place] [id number] [phone]              Who to contact     Please call your clinic at [phone] to:    Ask questions about your health    Make or cancel appointments    Discuss your medicines    Learn about your test results    Speak to your doctor   If you have compliments or concerns about an experience at your clinic, or if you wish to file a complaint, please contact [facility] Patient Relations at [phone] or email us at [email or web address]         Additional Information About Your Visit        MyChart Information     [employer] gives you secure access to your electronic health record. If you see a primary care provider, you can also send messages to your care team and make appointments. If you have questions, please call your primary care clinic.  If you do not have a primary care  provider, please call [phone] and they will assist you.      [employer] is an electronic gateway that provides easy, online access to your medical records. With [employer], you can request a clinic appointment, read your test results, renew a prescription or communicate with your care team.     To access your existing account, please contact your [facility] or call [phone] for assistance.        Care EveryWhere ID     This is your Care EveryWhere ID. This could be used by other organizations to access your [facility] medical records  [id number]        Your Vitals Were     Pulse Temperature Height Pulse Oximetry BMI (Body Mass Index)       90 98.3  F (36.8  C) (Oral) 6' 95% 25.47 kg/m2        Blood Pressure from Last 3 Encounters:   10/11/17 152/79   10/02/17 146/90   09/22/17 155/85    Weight from Last 3 Encounters:   10/11/17 187 lb 12.8 oz   10/02/17 192 lb 3.2 oz   09/21/17 194 lb              Today, you had the following     No orders found for display         Today's Medication Changes          These changes are accurate as of: 10/11/17  9:44 AM.  If you have any questions, ask your nurse or doctor.               These medicines have changed or have updated prescriptions.        Dose/Directions    insulin glargine 100 UNIT/ML injection   Commonly known as:  LANTUS   This may have changed:    - how much to take  - when to take this   Used for:  Liver transplant recipient (H)        Dose:  5 Units   Inject 5 Units Subcutaneous At Bedtime   Quantity:  3 mL   Refills:  1                Primary Care Provider    Shay Kirkpatrick MD       PWB  MD [id number]        Equal Access to Services     [facility] AH: Hadii tavo swartz hadasho Sojose, waaxda luqadaha, qaybta kaalmada adeegyada, devi craig . So [facility] [phone].    ATENCIÓN: Si habla español, tiene a zheng disposición servicios gratuitos de asistencia lingüística. Llame al [phone].    We comply with  applicable federal civil rights laws and [place] laws. We do not discriminate on the basis of race, color, national origin, age, disability, sex, sexual orientation, or gender identity.            Thank you!     Thank you for choosing [facility] COLON AND RECTAL SURGERY  for your care. Our goal is always to provide you with excellent care. Hearing back from our patients is one way we can continue to improve our services. Please take a few minutes to complete the written survey that you may receive in the mail after your visit with us. Thank you!             Your Updated Medication List - Protect others around you: Learn how to safely use, store and throw away your medicines at www.disposemymeds.org.          This list is accurate as of: 10/11/17  9:44 AM.  Always use your most recent med list.                   Brand Name Dispense Instructions for use Diagnosis    acetaminophen 325 MG tablet    TYLENOL    100 tablet    Take 2 tablets (650 mg) by mouth every 4 hours as needed for mild pain    Osteoarthritis, unspecified osteoarthritis type, unspecified site       amLODIPine 5 MG tablet    NORVASC    60 tablet    Take 2 tablets (10 mg) by mouth daily    History of liver transplant (H), Elevated blood pressure reading, Long-term use of immunosuppressant medication       cyclobenzaprine 10 MG tablet    FLEXERIL    90 tablet    Take 1 tablet (10 mg) by mouth 3 times daily as needed for muscle spasms    Immunosuppression (H)       fludrocortisone 0.1 MG tablet    FLORINEF    60 tablet    Take 2 tablets (0.2 mg) by mouth daily For elevated potassium    History of liver transplant (H), Long-term use of immunosuppressant medication       furosemide 20 MG tablet    LASIX    30 tablet    Take 1 tablet (20 mg) by mouth daily    Hyperkalemia       * insulin aspart 100 UNIT/ML injection    NovoLOG FLEXPEN    3 mL    Inject 1-10 Units Subcutaneous 3 times daily (with meals) Pre-Meal  - 164 give 1 unit.   - 189 give 2  units.   - 214 give 3 units.   - 239 give 4 units.   - 264 give 5 units.   - 289 give 6 units.   - 314 give 7 units.   - 339 give 8 units.   - 364 give 9 units. BG greater than or equal to 365 give 10 units    Type 2 diabetes mellitus with diabetic polyneuropathy, unspecified long term insulin use status (H)       * insulin aspart 100 UNIT/ML injection    NovoLOG FLEXPEN    3 mL    Inject 1-7 Units Subcutaneous At Bedtime  - 224 give 1 units.   - 249 give 2 units.   - 274 give 3 units.   - 299 give 4 units.   - 324 give 5 units.   - 349 give 6 units.  BG greater than or equal to 350 give 7 units.    Type 2 diabetes mellitus with diabetic polyneuropathy, unspecified long term insulin use status (H)       insulin glargine 100 UNIT/ML injection    LANTUS    3 mL    Inject 5 Units Subcutaneous At Bedtime    Liver transplant recipient (H)       loperamide 2 MG capsule    IMODIUM    120 capsule    Take 2 capsules (4 mg) by mouth 2 times daily    S/P right hemicolectomy       oxyCODONE 10 MG IR tablet    ROXICODONE    30 tablet    Take 0.5 tablets (5 mg) by mouth every 4 hours as needed for moderate to severe pain    History of liver transplant (H)       pantoprazole 40 MG EC tablet    PROTONIX    30 tablet    Take 1 tablet (40 mg) by mouth daily    S/P liver transplant (H)       sodium bicarbonate 650 MG tablet     90 tablet    Take 1 tablet (650 mg) by mouth 3 times daily    Liver transplant recipient (H)       sodium chloride (PF) 0.9% PF flush     600 mL    Flush 12 french drain with 10mls NS BID, flush 24 Fr drain with 20mls NS BID    Intra-abdominal abscess (H)       sodium polystyrene 0.25 GM/ML suspension    KAYEXALATE    240 mL    Take 240 mLs (60 g) by mouth once for 1 dose Avoid taking other oral medications 3 hours before or after this medication.        * tacrolimus 1 MG capsule    GENERIC EQUIVALENT    60 capsule    Take 1 capsule  (1 mg) by mouth every 12 hours *take with 5 mg capsules for total of 6 mg every 12 hours    Liver transplant recipient (H)       * tacrolimus 5 MG capsule    GENERIC EQUIVALENT    60 capsule    Take 1 capsule (5 mg) by mouth every 12 hours *take with 1 mg capsules for total dose 6 mg every 12 hours    Liver transplant recipient (H)       tadalafil 5 MG tablet    CIALIS    30 tablet    Take 1 tablet (5 mg) by mouth daily Never use with nitroglycerin, terazosin or doxazosin.    Drug-induced erectile dysfunction       * Notice:  This list has 4 medication(s) that are the same as other medications prescribed for you. Read the directions carefully, and ask your doctor or other care provider to review them with you.

## 2017-10-11 NOTE — MR AVS SNAPSHOT
After Visit Summary   10/11/2017    Camacho Bhagat    MRN: [id number]           Patient Information     Date Of Birth          1965        Visit Information        Provider Department      10/11/2017 9:30 AM Ronaldo Burkett RN [facility] Wound Ostomy        Today's Diagnoses     Ostomy nurse consultation    -  1       Follow-ups after your visit        Your next 10 appointments already scheduled     Oct 16, 2017 11:30 AM CDT   (Arrive by 11:15 AM)   Liver Return Post Op with Jovan Tran MD   [facility] Transplant ([facility])    [address] [place] [address]  [address] [facility] [id number]   [phone]            Dec 01, 2017  8:45 AM CST   (Arrive by 8:30 AM)   Return Liver Transplant with Toñito Camejo MD   [facility] Hepatology ([facility])    [address] [place] [address]  [address] [facility] [address]   [phone]            Dec 13, 2017 10:30 AM CST   (Arrive by 10:15 AM)   Return Visit with Sara Dinh MD   [facility] Rectal Surgery ([facility])    [address] [place] [address]  [address] [facility] [id number] [phone]              Who to contact     Please call your clinic at [phone] to:    Ask questions about your health    Make or cancel appointments    Discuss your medicines    Learn about your test results    Speak to your doctor   If you have compliments or concerns about an experience at your clinic, or if you wish to file a complaint, please contact [facility] Patient Relations at [phone] or email us at [email or web address]         Additional Information About Your Visit        MyChart Information     [employer] gives you secure access to your electronic health record. If you see a primary care provider, you can also send messages to your care team and make appointments. If you have questions, please call your primary care  clinic.  If you do not have a primary care provider, please call [phone] and they will assist you.      [employer] is an electronic gateway that provides easy, online access to your medical records. With [employer], you can request a clinic appointment, read your test results, renew a prescription or communicate with your care team.     To access your existing account, please contact your [facility] or call [phone] for assistance.        Care EveryWhere ID     This is your Care EveryWhere ID. This could be used by other organizations to access your [facility] medical records  [id number]         Blood Pressure from Last 3 Encounters:   10/11/17 152/79   10/02/17 146/90   09/22/17 155/85    Weight from Last 3 Encounters:   10/11/17 85.2 kg (187 lb 12.8 oz)   10/02/17 87.2 kg (192 lb 3.2 oz)   09/21/17 88 kg (194 lb)              Today, you had the following     No orders found for display         Today's Medication Changes          These changes are accurate as of: 10/11/17  4:25 PM.  If you have any questions, ask your nurse or doctor.               These medicines have changed or have updated prescriptions.        Dose/Directions    insulin glargine 100 UNIT/ML injection   Commonly known as:  LANTUS   This may have changed:    - how much to take  - when to take this   Used for:  Liver transplant recipient (H)        Dose:  5 Units   Inject 5 Units Subcutaneous At Bedtime   Quantity:  3 mL   Refills:  1                Primary Care Provider    Shay Kirkpatrick MD       PWB  [place] [id number]        Equal Access to Services     [facility]: Hadii tavo swartz hadnaio Sojose, waaxda luqadaha, qaybta kaalmada devi dolan . So [facility] [phone].    ATENCIÓN: Si habla español, tiene a zheng disposición servicios gratuitos de asistencia lingüística. Llame al [phone].    We comply with applicable federal civil rights laws and [place] laws. We do not  discriminate on the basis of race, color, national origin, age, disability, sex, sexual orientation, or gender identity.            Thank you!     Thank you for choosing [facility]  for your care. Our goal is always to provide you with excellent care. Hearing back from our patients is one way we can continue to improve our services. Please take a few minutes to complete the written survey that you may receive in the mail after your visit with us. Thank you!             Your Updated Medication List - Protect others around you: Learn how to safely use, store and throw away your medicines at www.disposemymeds.org.          This list is accurate as of: 10/11/17  4:25 PM.  Always use your most recent med list.                   Brand Name Dispense Instructions for use Diagnosis    acetaminophen 325 MG tablet    TYLENOL    100 tablet    Take 2 tablets (650 mg) by mouth every 4 hours as needed for mild pain    Osteoarthritis, unspecified osteoarthritis type, unspecified site       amLODIPine 5 MG tablet    NORVASC    60 tablet    Take 2 tablets (10 mg) by mouth daily    History of liver transplant (H), Elevated blood pressure reading, Long-term use of immunosuppressant medication       cyclobenzaprine 10 MG tablet    FLEXERIL    90 tablet    Take 1 tablet (10 mg) by mouth 3 times daily as needed for muscle spasms    Immunosuppression (H)       fludrocortisone 0.1 MG tablet    FLORINEF    60 tablet    Take 2 tablets (0.2 mg) by mouth daily For elevated potassium    History of liver transplant (H), Long-term use of immunosuppressant medication       furosemide 20 MG tablet    LASIX    30 tablet    Take 1 tablet (20 mg) by mouth daily    Hyperkalemia       * insulin aspart 100 UNIT/ML injection    NovoLOG FLEXPEN    3 mL    Inject 1-10 Units Subcutaneous 3 times daily (with meals) Pre-Meal  - 164 give 1 unit.   - 189 give 2 units.   - 214 give 3 units.   - 239 give 4 units.   - 264  give 5 units.   - 289 give 6 units.   - 314 give 7 units.   - 339 give 8 units.   - 364 give 9 units. BG greater than or equal to 365 give 10 units    Type 2 diabetes mellitus with diabetic polyneuropathy, unspecified long term insulin use status (H)       * insulin aspart 100 UNIT/ML injection    NovoLOG FLEXPEN    3 mL    Inject 1-7 Units Subcutaneous At Bedtime  - 224 give 1 units.   - 249 give 2 units.   - 274 give 3 units.   - 299 give 4 units.   - 324 give 5 units.   - 349 give 6 units.  BG greater than or equal to 350 give 7 units.    Type 2 diabetes mellitus with diabetic polyneuropathy, unspecified long term insulin use status (H)       insulin glargine 100 UNIT/ML injection    LANTUS    3 mL    Inject 5 Units Subcutaneous At Bedtime    Liver transplant recipient (H)       loperamide 2 MG capsule    IMODIUM    120 capsule    Take 2 capsules (4 mg) by mouth 2 times daily    S/P right hemicolectomy       oxyCODONE 10 MG IR tablet    ROXICODONE    30 tablet    Take 0.5 tablets (5 mg) by mouth every 4 hours as needed for moderate to severe pain    History of liver transplant (H)       pantoprazole 40 MG EC tablet    PROTONIX    30 tablet    Take 1 tablet (40 mg) by mouth daily    S/P liver transplant (H)       sodium bicarbonate 650 MG tablet     90 tablet    Take 1 tablet (650 mg) by mouth 3 times daily    Liver transplant recipient (H)       sodium chloride (PF) 0.9% PF flush     600 mL    Flush 12 french drain with 10mls NS BID, flush 24 Fr drain with 20mls NS BID    Intra-abdominal abscess (H)       sodium polystyrene 0.25 GM/ML suspension    KAYEXALATE    240 mL    Take 240 mLs (60 g) by mouth once for 1 dose Avoid taking other oral medications 3 hours before or after this medication.        * tacrolimus 1 MG capsule    GENERIC EQUIVALENT    60 capsule    Take 1 capsule (1 mg) by mouth every 12 hours *take with 5 mg capsules for total of 6 mg  every 12 hours    Liver transplant recipient (H)       * tacrolimus 5 MG capsule    GENERIC EQUIVALENT    60 capsule    Take 1 capsule (5 mg) by mouth every 12 hours *take with 1 mg capsules for total dose 6 mg every 12 hours    Liver transplant recipient (H)       tadalafil 5 MG tablet    CIALIS    30 tablet    Take 1 tablet (5 mg) by mouth daily Never use with nitroglycerin, terazosin or doxazosin.    Drug-induced erectile dysfunction       * Notice:  This list has 4 medication(s) that are the same as other medications prescribed for you. Read the directions carefully, and ask your doctor or other care provider to review them with you.

## 2017-10-11 NOTE — NURSING NOTE
Chief Complaint   Patient presents with     RECHECK     post op surgery 7/26/17       Vitals:    10/11/17 0822   BP: 152/79   BP Location: Right arm   Patient Position: Chair   Cuff Size: Adult Regular   Pulse: 90   Temp: 98.3  F (36.8  C)   TempSrc: Oral   SpO2: 95%   Weight: 187 lb 12.8 oz   Height: 6'       Body mass index is 25.47 kg/(m^2).      Joselin Stovall RN

## 2017-10-12 ENCOUNTER — TELEPHONE (OUTPATIENT)
Dept: TRANSPLANT | Facility: CLINIC | Age: 52
End: 2017-10-12

## 2017-10-12 ENCOUNTER — TELEPHONE (OUTPATIENT)
Dept: INTERNAL MEDICINE | Facility: CLINIC | Age: 52
End: 2017-10-12

## 2017-10-12 NOTE — TELEPHONE ENCOUNTER
Per verbal from txp coordinator Abril BOX, okay for pt to wait on tacrolimus dose change until after labs are drawn tomorrow.    He reports new symptoms that he thinks might be related to Prograf: stomach pain, headache, possible diarrhea (unsure d/t ileostomy), and itchiness. Thinks the itchiness could be related to hyperglycemia, as that has happened in the past. Reviewed and updated his diabetic medications, they have been changed. Pt aware message will be sent to coordinator for review. Verbalized understanding and has no questions at this time.

## 2017-10-12 NOTE — TELEPHONE ENCOUNTER
Please call Camacho to increase his tacrolimus dose to 7mg Q12 hours.   Please ask him to do labs next week.

## 2017-10-13 ENCOUNTER — TELEPHONE (OUTPATIENT)
Dept: TRANSPLANT | Facility: CLINIC | Age: 52
End: 2017-10-13

## 2017-10-13 ENCOUNTER — OFFICE VISIT (OUTPATIENT)
Dept: INTERNAL MEDICINE | Facility: CLINIC | Age: 52
End: 2017-10-13

## 2017-10-13 ENCOUNTER — HOSPITAL ENCOUNTER (OUTPATIENT)
Dept: LAB | Facility: CLINIC | Age: 52
Discharge: HOME | End: 2017-10-13
Attending: TRANSPLANT SURGERY | Admitting: INTERNAL MEDICINE
Payer: COMMERCIAL

## 2017-10-13 VITALS
WEIGHT: 186.4 LBS | OXYGEN SATURATION: 100 % | SYSTOLIC BLOOD PRESSURE: 146 MMHG | DIASTOLIC BLOOD PRESSURE: 81 MMHG | HEART RATE: 91 BPM | RESPIRATION RATE: 20 BRPM | BODY MASS INDEX: 25.28 KG/M2

## 2017-10-13 DIAGNOSIS — I99.8 ISCHEMIA OF DIGITS OF HAND: Primary | ICD-10-CM

## 2017-10-13 DIAGNOSIS — Z79.60 LONG-TERM USE OF IMMUNOSUPPRESSANT MEDICATION: ICD-10-CM

## 2017-10-13 DIAGNOSIS — Z94.4 LIVER TRANSPLANT RECIPIENT (H): ICD-10-CM

## 2017-10-13 DIAGNOSIS — L97.519: ICD-10-CM

## 2017-10-13 DIAGNOSIS — Z94.4 HISTORY OF LIVER TRANSPLANT (H): ICD-10-CM

## 2017-10-13 DIAGNOSIS — Z94.4 HISTORY OF LIVER TRANSPLANT (H): Primary | ICD-10-CM

## 2017-10-13 DIAGNOSIS — K68.19 RETROPERITONEAL ABSCESS (H): ICD-10-CM

## 2017-10-13 DIAGNOSIS — D72.819 DECREASED WHITE BLOOD CELL COUNT: ICD-10-CM

## 2017-10-13 LAB
ALBUMIN SERPL-MCNC: 2.8 G/DL (ref 3.4–5)
ALP SERPL-CCNC: 480 U/L (ref 40–150)
ALT SERPL W P-5'-P-CCNC: 47 U/L (ref 0–70)
ANION GAP SERPL CALCULATED.3IONS-SCNC: 7 MMOL/L (ref 3–14)
AST SERPL W P-5'-P-CCNC: 125 U/L (ref 0–45)
BASOPHILS # BLD AUTO: 0 10E9/L (ref 0–0.2)
BASOPHILS NFR BLD AUTO: 0.2 %
BILIRUB DIRECT SERPL-MCNC: 0.4 MG/DL (ref 0–0.2)
BILIRUB SERPL-MCNC: 0.8 MG/DL (ref 0.2–1.3)
BUN SERPL-MCNC: 31 MG/DL (ref 7–30)
CALCIUM SERPL-MCNC: 8.4 MG/DL (ref 8.5–10.1)
CHLORIDE SERPL-SCNC: 104 MMOL/L (ref 94–109)
CO2 SERPL-SCNC: 23 MMOL/L (ref 20–32)
CREAT SERPL-MCNC: 1.79 MG/DL (ref 0.66–1.25)
DIFFERENTIAL METHOD BLD: NORMAL
EOSINOPHIL # BLD AUTO: 0.1 10E9/L (ref 0–0.7)
EOSINOPHIL NFR BLD AUTO: 3.1 %
ERYTHROCYTE [DISTWIDTH] IN BLOOD BY AUTOMATED COUNT: 15.7 % (ref 10–15)
GFR SERPL CREATININE-BSD FRML MDRD: 40 ML/MIN/1.7M2
GLUCOSE SERPL-MCNC: 246 MG/DL (ref 70–99)
HCT VFR BLD AUTO: 22.1 % (ref 40–53)
HGB BLD-MCNC: 7.2 G/DL (ref 13.3–17.7)
IMM GRANULOCYTES # BLD: 0.1 10E9/L (ref 0–0.4)
IMM GRANULOCYTES NFR BLD: 1.2 %
LYMPHOCYTES # BLD AUTO: 1.4 10E9/L (ref 0.8–5.3)
LYMPHOCYTES NFR BLD AUTO: 34.1 %
MAGNESIUM SERPL-MCNC: 1.5 MG/DL (ref 1.6–2.3)
MCH RBC QN AUTO: 30.6 PG (ref 26.5–33)
MCHC RBC AUTO-ENTMCNC: 32.6 G/DL (ref 31.5–36.5)
MCV RBC AUTO: 94 FL (ref 78–100)
MONOCYTES # BLD AUTO: 0.4 10E9/L (ref 0–1.3)
MONOCYTES NFR BLD AUTO: 10.1 %
NEUTROPHILS # BLD AUTO: 2.1 10E9/L (ref 1.6–8.3)
NEUTROPHILS NFR BLD AUTO: 51.3 %
NRBC # BLD AUTO: 0 10*3/UL
NRBC BLD AUTO-RTO: 0 /100
PHOSPHATE SERPL-MCNC: 4.3 MG/DL (ref 2.5–4.5)
PLATELET # BLD AUTO: 79 10E9/L (ref 150–450)
POTASSIUM SERPL-SCNC: 5.5 MMOL/L (ref 3.4–5.3)
PROT SERPL-MCNC: 7.6 G/DL (ref 6.8–8.8)
RBC # BLD AUTO: 2.35 10E12/L (ref 4.4–5.9)
SODIUM SERPL-SCNC: 134 MMOL/L (ref 133–144)
TACROLIMUS BLD-MCNC: 3.2 UG/L (ref 5–15)
TME LAST DOSE: ABNORMAL H
WBC # BLD AUTO: 4.2 10E9/L (ref 4–11)

## 2017-10-13 PROCEDURE — 85027 COMPLETE CBC AUTOMATED: CPT | Performed by: INTERNAL MEDICINE

## 2017-10-13 PROCEDURE — 80076 HEPATIC FUNCTION PANEL: CPT | Performed by: INTERNAL MEDICINE

## 2017-10-13 PROCEDURE — 80048 BASIC METABOLIC PNL TOTAL CA: CPT | Performed by: INTERNAL MEDICINE

## 2017-10-13 PROCEDURE — 83735 ASSAY OF MAGNESIUM: CPT | Performed by: INTERNAL MEDICINE

## 2017-10-13 PROCEDURE — 84100 ASSAY OF PHOSPHORUS: CPT | Performed by: INTERNAL MEDICINE

## 2017-10-13 PROCEDURE — 80197 ASSAY OF TACROLIMUS: CPT | Performed by: INTERNAL MEDICINE

## 2017-10-13 PROCEDURE — 36415 COLL VENOUS BLD VENIPUNCTURE: CPT | Performed by: INTERNAL MEDICINE

## 2017-10-13 PROCEDURE — 85004 AUTOMATED DIFF WBC COUNT: CPT | Performed by: INTERNAL MEDICINE

## 2017-10-13 RX ORDER — TACROLIMUS 5 MG/1
5 CAPSULE ORAL EVERY 12 HOURS
Qty: 60 CAPSULE | Refills: 11 | Status: SHIPPED | OUTPATIENT
Start: 2017-10-13 | End: 2017-10-31

## 2017-10-13 RX ORDER — TACROLIMUS 1 MG/1
2 CAPSULE ORAL EVERY 12 HOURS
Qty: 60 CAPSULE | Refills: 11 | Status: SHIPPED | OUTPATIENT
Start: 2017-10-13 | End: 2017-10-31

## 2017-10-13 ASSESSMENT — PAIN SCALES - GENERAL: PAINLEVEL: NO PAIN (0)

## 2017-10-13 NOTE — TELEPHONE ENCOUNTER
Phone message left for Camacho rupesh: plan for U/S, will request this to be scheduled on Monday before his appt with , with liver biopsy to be scheduled next week. (not Monday)  Pt instructed to increase his tacrolimus dose to 7mg Q 12 hours for elevated LFT's, done today.  Last tacrolimus level 4.5, too low.

## 2017-10-13 NOTE — PROGRESS NOTES
"Camacho Bhagat is a 52 year old male who comes in for    CC: right toe and right index finger--black  HPI:    Mr. Bhagat has a complex medical history of CASTAÑEDA cirrhosis s/p liver transplant in March 2017, and hospitalized for approx 8 weeks this summer for sepsis secondary to typhlitis, abdominal compartment syndrome and intraperitoneal abscess. He was on vasopressor support in the ICU and reports the tips of his right index finger and right great and 2nd toe were black when he was discharged from the hospital. He was on Linezolid (8/19-8/22), switched to doxycycline x7 days (8/22-8/28) for cellulitis/dry gangrene of right finger, and per chart review was clinically resolved at discharge (microvascular ischemic injury--\"erythema of Right 1st finger documented and marked, now resolved\" on discharge summary--unclear how long it has been black?). Over the past week, the toe has become more bothersome/tender. There is mild redness from around the black tip of the toes.    Peritoneal abscess drain--Right side--pt accidentally stepped on it this morning, pulled it out. He reports there was no drainage coming from the drain other than what he used for flushing it. There was no leaking around the site. Has had small amount of drainage/bleeding since drain was pulled. It was due to be removed in the next week or two.    Otherwise feeling well--no fevers. Thinks he may have some SEs from Tacro--headaches and stomach pain.     Diabetes--fairly good control, no hypoglycemia and no glucose readings >200.     Other issues discussed today:     Patient Active Problem List   Diagnosis     Carpal tunnel syndrome     Essential hypertension     Personal history of thrombophlebitis     Esophageal reflux     Depressive disorder, not elsewhere classified     Hyperlipidemia     Sleep apnea     Testicular hypofunction     HYPERLIPIDEMIA LDL GOAL <100     Fibromyalgia     OA (osteoarthritis)     Sicca syndrome (H)     Knee pain     Primary " localized osteoarthrosis, lower leg     Diabetes mellitus with neurological manifestation (H)     Medication refill- do not delete      Pain medication agreement signed - Shay Kaya 2/7/14     Hepatocellular carcinoma (H)     Uncontrolled type 2 diabetes mellitus with hyperglycemia, with long-term current use of insulin (H)     Osteoarthritis     Rheumatoid arthritis (H)     Hyperkalemia     Liver transplant recipient (H)     Immunosuppressed status (H)     Neck pain     Back pain     Sepsis (H)     Typhlitis     Neutropenic colitis (H)     S/P liver transplant (H)     Retroperitoneal abscess (H)     Peritonitis and retroperitoneal infections (H)     Delirium, acute     Pancytopenia (H)     EJ (acute kidney injury) (H)     Hyperkalemic renal tubular acidosis     Inadequate oral intake     Gangrene of finger (H)     Ischemia of both lower extremities     Low hemoglobin       Current Outpatient Prescriptions   Medication Sig Dispense Refill     GABAPENTIN PO Take 300 mg by mouth 3 times daily       insulin glargine (LANTUS) 100 UNIT/ML injection Inject 50 Units Subcutaneous every morning       linagliptin (TRADJENTA) 5 MG TABS tablet Take 5 mg by mouth daily       sodium chloride, PF, 0.9% PF flush Flush 12 Danish drain with 10mls NS BID, flush 24 Fr drain with 20mls NS  mL 3     tacrolimus (GENERIC EQUIVALENT) 1 MG capsule Take 1 capsule (1 mg) by mouth every 12 hours *take with 5 mg capsules for total of 6 mg every 12 hours 60 capsule 11     tacrolimus (GENERIC EQUIVALENT) 5 MG capsule Take 1 capsule (5 mg) by mouth every 12 hours *take with 1 mg capsules for total dose 6 mg every 12 hours 60 capsule 11     furosemide (LASIX) 20 MG tablet Take 1 tablet (20 mg) by mouth daily 30 tablet 3     sodium polystyrene (KAYEXALATE) 0.25 GM/ML suspension Take 240 mLs (60 g) by mouth once for 1 dose Avoid taking other oral medications 3 hours before or after this medication. 240 mL 0     fludrocortisone (FLORINEF) 0.1  MG tablet Take 2 tablets (0.2 mg) by mouth daily For elevated potassium 60 tablet 3     oxyCODONE (ROXICODONE) 10 MG IR tablet Take 0.5 tablets (5 mg) by mouth every 4 hours as needed for moderate to severe pain 30 tablet 0     acetaminophen (TYLENOL) 325 MG tablet Take 2 tablets (650 mg) by mouth every 4 hours as needed for mild pain 100 tablet 3     loperamide (IMODIUM) 2 MG capsule Take 2 capsules (4 mg) by mouth 2 times daily 120 capsule 3     amLODIPine (NORVASC) 5 MG tablet Take 2 tablets (10 mg) by mouth daily 60 tablet 3     cyclobenzaprine (FLEXERIL) 10 MG tablet Take 1 tablet (10 mg) by mouth 3 times daily as needed for muscle spasms 90 tablet 0     pantoprazole (PROTONIX) 40 MG EC tablet Take 1 tablet (40 mg) by mouth daily 30 tablet 5     sodium bicarbonate 650 MG tablet Take 1 tablet (650 mg) by mouth 3 times daily 90 tablet 5     tadalafil (CIALIS) 5 MG tablet Take 1 tablet (5 mg) by mouth daily Never use with nitroglycerin, terazosin or doxazosin. 30 tablet 11         ALLERGIES: Erythromycin and Vioxx    PAST MEDICAL HX:   Past Medical History:   Diagnosis Date     Cancer (H)      Depressive disorder, not elsewhere classified      Esophageal reflux      Fibromyalgia 1/2009    dx with Dr Benitez( [facility])     History of thrombophlebitis      Osteoarthritis      Other acute embolism veins 11/01    Deep vein thrombophlebitis, filter placed     Other and unspecified hyperlipidemia      Other chronic nonalcoholic liver disease     Fatty liver      Other testicular hypofunction      Pneumonia, organism unspecified(486) 10-01    Included ARDS, sepsis, and  acute renal failure; hospitalized     Rheumatoid arthritis(714.0)      Type II or unspecified type diabetes mellitus without mention of complication, not stated as uncontrolled 11/01    Managed by endocrinology     Unspecified essential hypertension 11/01    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     Unspecified sleep apnea     Uses BiPAP       PAST  SURGICAL HX:   Past Surgical History:   Procedure Laterality Date     BENCH LIVER N/A 3/4/2017    Procedure: BENCH LIVER;  Surgeon: Jovan Tran MD;  Location: [facility] OR     C NONSPECIFIC PROCEDURE      tracheostomy     C NONSPECIFIC PROCEDURE      repair of deviated septum     C NONSPECIFIC PROCEDURE      Rt knee arthroscopy     C TOTAL KNEE ARTHROPLASTY  2008    Right knee arthroscopy     CHOLECYSTECTOMY       COLONOSCOPY N/A 2017    Procedure: COMBINED COLONOSCOPY, SINGLE OR MULTIPLE BIOPSY/POLYPECTOMY BY BIOPSY;  Colonoscopy;  Surgeon: Izaiah Montes MD;  Location: [facility] GI     ESOPHAGOSCOPY, GASTROSCOPY, DUODENOSCOPY (EGD), COMBINED N/A 2016    Procedure: COMBINED ESOPHAGOSCOPY, GASTROSCOPY, DUODENOSCOPY (EGD), BIOPSY SINGLE OR MULTIPLE;  Surgeon: Trent Pederson MD;  Location:  GI     ESOPHAGOSCOPY, GASTROSCOPY, DUODENOSCOPY (EGD), COMBINED N/A 2017    Procedure: COMBINED ESOPHAGOSCOPY, GASTROSCOPY, DUODENOSCOPY (EGD);;  Surgeon: Los Wynn MD;  Location: [facility] GI     LAPAROTOMY EXPLORATORY N/A 2017    Procedure: LAPAROTOMY EXPLORATORY;  Exploratory Laparotomy, washout;  Surgeon: Tip Zhang MD;  Location: [facility] OR     LAPAROTOMY EXPLORATORY N/A 2017    Procedure: LAPAROTOMY EXPLORATORY;  Exploratory Laparotomy, Washout with closure.;  Surgeon: Tip Zhang MD;  Location: [facility] OR     LAPAROTOMY EXPLORATORY N/A 2017    Procedure: LAPAROTOMY EXPLORATORY;  Exploratory Laparotomy, Right angelique-colectomy, end ileostomy, mucosal fistula, partial omentectomy;  Surgeon: Sara Dinh MD;  Location: [facility] OR     TRANSPLANT LIVER RECIPIENT  DONOR N/A 3/4/2017    Procedure: TRANSPLANT LIVER RECIPIENT  DONOR;  Surgeon: Jovan Tran MD;  Location: [facility] OR       IMMUNIZATION HX:   Immunization History   Administered Date(s) Administered     HepB 2016     Influenza (IIV3) 2002, 2003, 10/08/2004, 10/10/2005,  11/21/2006, 09/17/2009, 02/07/2014     Influenza Vaccine IM 3yrs+ 4 Valent IIV4 10/05/2015, 10/17/2016, 10/02/2017     Mantoux 03/01/2004     Pneumococcal (PCV 13) 12/20/2016     Pneumococcal 23 valent 11/21/2006, 10/05/2015     TD (ADULT, 7+) 02/17/2004     TDAP Vaccine (Boostrix) 09/07/2012       SOCIAL HX:   Social History     Social History Narrative    uSED TO BE      Back in school now           ROS:   CONSTITUTIONAL: no fatigue, no unexpected change in weight  SKIN: no worrisome rashes, no worrisome moles, see HPI  RESP: no significant cough, no shortness of breath  CV: no chest pain, no palpitations, no new or worsening peripheral edema  GI: no nausea, no vomiting, no constipation, no diarrhea  MSK: see HPI    OBJECTIVE:  /81 (BP Location: Right arm, Patient Position: Chair, Cuff Size: Adult Regular)  Pulse 91  Resp 20  Wt 84.6 kg (186 lb 6.4 oz)  SpO2 100%  BMI 25.28 kg/m2   Wt Readings from Last 1 Encounters:   10/13/17 84.6 kg (186 lb 6.4 oz)     Constitutional: no distress, comfortable, pleasant, well-groomed  Cardiovascular: regular rate and rhythm, normal S1 and S2, no murmurs, rubs or gallops, pulses 2+, cap refill 2 sec  Respiratory: clear to auscultation with good air movement bilaterally, no wheezes or crackles, non-labored  Gastrointestinal: positive bowel sounds, nontender, no hepatosplenomegaly, no masses, scarred, L peritoneal drain in place, RLQ colostomy  Musculoskeletal: full range of motion, strength 5/5, no edema   Skin: no jaundice, temp normal 1x1 cm drain site in RLQ with small amount serosanguinous drainage, no surrounding erythema, tip of R index finger is necrotic without erythema or edema, tips of R great and 2nd toes are black with mild erythema  Psychological: appropriate mood, demonstrates intact judgment and logical thought process    ASSESSMENT/PLAN:    1. Ischemia of digits of hand    2. Ischemic toe ulcer, right, with unspecified severity  (H)    3. Retroperitoneal abscess (H)    4. Liver transplant recipient (H)      Referral for vascular surgery given microvascular ischemia of digits s/p pressor support. These do not appear infected in clinic, but given unclear history, recommended pt f/u with vascular clinic as soon as possible.  Consulted with transplant clinic--drains were to be removed in the near future, no need to replace R-sided peritoneal drain given no output. Site was cleansed with micro-klenz, dried and ABD pad dressing applied.  Reviewed importance of regular tacro use--discussed mild headache and stomach ache would not be caused by sub-therapeutic tacro level. He will need a liver biopsy d/t bump in liver enzymes, this is being coordinated by transplant team.    FOLLOW UP: If not improving or if worsening--if any fever, worsening pain, redness around finger/toes, or if increased drainage from abdominal drain site    LAMIN Squires CNP

## 2017-10-13 NOTE — Clinical Note
Madisyn, can you find out if pt can do the liver U/S on 10/16, Monday when he comes for the appt with .

## 2017-10-13 NOTE — Clinical Note
Hi, Dr. Beal, Here is the patient I was discussing with you with ischemic finger tip and tip of toes for past month since hospital discharge. Would be great if he could see you in clinic. Thanks so much, Chelsy Zamora, LAMIN CNP

## 2017-10-13 NOTE — MR AVS SNAPSHOT
"              After Visit Summary   10/13/2017    Camacho Bhagat    MRN: [id number]           Patient Information     Date Of Birth          1965        Visit Information        Provider Department      10/13/2017 2:40 PM Chelsy Zamora APRN [facility]ly [facility]        Today's Diagnoses     Ischemia of digits of hand    -  1    Ischemic toe ulcer, right, with unspecified severity (H)          Care Instructions    Primary Care Center: [phone]     Primary Care Center Medication Refill Request Information:  * Please contact your pharmacy regarding ANY request for medication refills.  ** [facility] Prescription Fax = [phone]  * Please allow 3 business days for routine medication refills.  * Please allow 5 business days for controlled substance medication refills.     Primary Care Center Test Result notification information:  *You will be notified with in 7-10 days of your appointment day regarding the results of your test.  If you are on MyChart you will be notified as soon as the provider has reviewed the results and signed off on them.    Follow-up with the Vascular Surgery Clinic (Dr. Beal) next week to address your blackened finger tip and toes.    Schedule a follow-up with your endocrinologist.               Follow-ups after your visit        Additional Services     VASCULAR SURGERY REFERRAL ([facility])       PAD without symptoms - order \"US RONAL Doppler no exercise\" ([id number]), lipid panel  PAD with claudication - order \"US RONAL Doppler with exercise\" ([id number]), \"US aorta/ivc/iliac duplex complete\" ([id number]) and \"US lower extremity arterial duplex bilateral\" ([id number])  PAD with critical limb ischemia - resting RONAL and toe-brachial index (refer to vascular medicine for triage of testing)  Varicose veins/edema - \"US lower extremity venous duplex bilateral\" ([id number])                    Your next 10 appointments already scheduled     Oct 16, 2017 11:30 AM CDT   (Arrive by 11:15 AM)   Liver Return " Post Op with Jovan Tran MD   [facility] Solid Organ Transplant ([facility])    [address]  [address] [facility] [address]   [phone]            Oct 16, 2017 12:00 PM CDT   Nurse Visit with  NEPHROLOGY NURSE   [facility]plant ([facility])    [address] [place] [id number]   [phone]            Oct 23, 2017  9:30 AM CDT   (Arrive by 9:15 AM)   Return Visit with Shay Kirkpatrick MD   [facility] ([facility])    [address] [place] [address]  [address] [facility] [id number] [phone]            Dec 01, 2017  8:45 AM CST   (Arrive by 8:30 AM)   Return Liver Transplant with Toñito Camejo MD   [facility] Hepatology ([facility])    [address] [place] [id number] [phone]            Dec 13, 2017 10:30 AM CST   (Arrive by 10:15 AM)   Return Visit with Sara Dinh MD   [facility] Colon and Rectal Surgery ([facility])    [address] [place] [id number] [phone]              Who to contact     Please call your clinic at [phone] to:    Ask questions about your health    Make or cancel appointments    Discuss your medicines    Learn about your test results    Speak to your doctor   If you have compliments or concerns about an experience at your clinic, or if you wish to file a complaint, please contact [facility] Patient Relations at [phone] or email us at [email or web address]         Additional Information About Your Visit        Act[employer]hart Information     [email or web address] gives you secure access to your electronic health record. If you see a primary care provider, you can also send messages to your care team and make appointments. If you have questions, please call your primary care clinic.  If you do not  have a primary care provider, please call [phone] and they will assist you.      [employer]ita is an electronic gateway that provides easy, online access to your medical records. With [employer], you can request a clinic appointment, read your test results, renew a prescription or communicate with your care team.     To access your existing account, please contact your [facility] or call [phone] for assistance.        Care EveryWhere ID     This is your Care EveryWhere ID. This could be used by other organizations to access your [facility] medical records  [id number]        Your Vitals Were     Pulse Respirations Pulse Oximetry BMI (Body Mass Index)          91 20 100% 25.28 kg/m2         Blood Pressure from Last 3 Encounters:   10/13/17 146/81   10/11/17 152/79   10/02/17 146/90    Weight from Last 3 Encounters:   10/13/17 84.6 kg (186 lb 6.4 oz)   10/11/17 85.2 kg (187 lb 12.8 oz)   10/02/17 87.2 kg (192 lb 3.2 oz)              We Performed the Following     VASCULAR SURGERY REFERRAL ([facility])        Primary Care Provider    Shay Kirkpatrick MD       [place] [address]        Equal Access to Services     : Hadii aad ku hadasho Soomaali, waaxda luqadaha, qaybta kaalmada adeegyada, waxay noelin hayyonathann gilberto craig . So [facility] [phone].    ATENCIÓN: Si habla español, tiene a zheng disposición servicios gratuitos de asistencia lingüística. Llame al [phone].    We comply with applicable federal civil rights laws and [place] laws. We do not discriminate on the basis of race, color, national origin, age, disability, sex, sexual orientation, or gender identity.            Thank you!     Thank you for choosing [facility]  for your care. Our goal is always to provide you with excellent care. Hearing back from our patients is one way we can continue to improve our services. Please take a few minutes to complete the written survey that you may receive in the  mail after your visit with us. Thank you!             Your Updated Medication List - Protect others around you: Learn how to safely use, store and throw away your medicines at www.disposemymeds.org.          This list is accurate as of: 10/13/17  3:13 PM.  Always use your most recent med list.                   Brand Name Dispense Instructions for use Diagnosis    acetaminophen 325 MG tablet    TYLENOL    100 tablet    Take 2 tablets (650 mg) by mouth every 4 hours as needed for mild pain    Osteoarthritis, unspecified osteoarthritis type, unspecified site       amLODIPine 5 MG tablet    NORVASC    60 tablet    Take 2 tablets (10 mg) by mouth daily    History of liver transplant (H), Elevated blood pressure reading, Long-term use of immunosuppressant medication       cyclobenzaprine 10 MG tablet    FLEXERIL    90 tablet    Take 1 tablet (10 mg) by mouth 3 times daily as needed for muscle spasms    Immunosuppression (H)       fludrocortisone 0.1 MG tablet    FLORINEF    60 tablet    Take 2 tablets (0.2 mg) by mouth daily For elevated potassium    History of liver transplant (H), Long-term use of immunosuppressant medication       furosemide 20 MG tablet    LASIX    30 tablet    Take 1 tablet (20 mg) by mouth daily    Hyperkalemia       GABAPENTIN PO      Take 300 mg by mouth 3 times daily        insulin glargine 100 UNIT/ML injection    LANTUS     Inject 50 Units Subcutaneous every morning        linagliptin 5 MG Tabs tablet    TRADJENTA     Take 5 mg by mouth daily        loperamide 2 MG capsule    IMODIUM    120 capsule    Take 2 capsules (4 mg) by mouth 2 times daily    S/P right hemicolectomy       oxyCODONE 10 MG IR tablet    ROXICODONE    30 tablet    Take 0.5 tablets (5 mg) by mouth every 4 hours as needed for moderate to severe pain    History of liver transplant (H)       pantoprazole 40 MG EC tablet    PROTONIX    30 tablet    Take 1 tablet (40 mg) by mouth daily    S/P liver transplant (H)       sodium  bicarbonate 650 MG tablet     90 tablet    Take 1 tablet (650 mg) by mouth 3 times daily    Liver transplant recipient (H)       sodium chloride (PF) 0.9% PF flush     600 mL    Flush 12 Malian drain with 10mls NS BID, flush 24 Fr drain with 20mls NS BID    Intra-abdominal abscess (H)       sodium polystyrene 0.25 GM/ML suspension    KAYEXALATE    240 mL    Take 240 mLs (60 g) by mouth once for 1 dose Avoid taking other oral medications 3 hours before or after this medication.        * tacrolimus 1 MG capsule    GENERIC EQUIVALENT    60 capsule    Take 1 capsule (1 mg) by mouth every 12 hours *take with 5 mg capsules for total of 6 mg every 12 hours    Liver transplant recipient (H)       * tacrolimus 5 MG capsule    GENERIC EQUIVALENT    60 capsule    Take 1 capsule (5 mg) by mouth every 12 hours *take with 1 mg capsules for total dose 6 mg every 12 hours    Liver transplant recipient (H)       tadalafil 5 MG tablet    CIALIS    30 tablet    Take 1 tablet (5 mg) by mouth daily Never use with nitroglycerin, terazosin or doxazosin.    Drug-induced erectile dysfunction       * Notice:  This list has 2 medication(s) that are the same as other medications prescribed for you. Read the directions carefully, and ask your doctor or other care provider to review them with you.

## 2017-10-13 NOTE — PATIENT INSTRUCTIONS
[facility]: [phone]     [facility] Medication Refill Request Information:  * Please contact your pharmacy regarding ANY request for medication refills.  ** [facility] Prescription Fax = [phone]  * Please allow 3 business days for routine medication refills.  * Please allow 5 business days for controlled substance medication refills.     [facility] Test Result notification information:  *You will be notified with in 7-10 days of your appointment day regarding the results of your test.  If you are on MyChart you will be notified as soon as the provider has reviewed the results and signed off on them.    Follow-up with the Vascular Surgery Clinic (Dr. Beal) next week to address your blackened finger tip and toes.    Schedule a follow-up with your endocrinologist.

## 2017-10-13 NOTE — NURSING NOTE
Chief Complaint   Patient presents with     Infection     right great toe and right index finger   Che Villasenor LPN 2:15 PM on 10/13/2017

## 2017-10-13 NOTE — TELEPHONE ENCOUNTER
Telephone message left for both Camacho and his mother, Humaira about plan for U/S and labs to be done on Monday 10/16, reminded not to eat or drink from 12 midn, to bring morning meds to take after labs and U/S done.  Instructed to increase tacrolimus dose to 7mg Q 12 hours.

## 2017-10-14 NOTE — PROGRESS NOTES
Colon and Rectal Surgery Clinic Note    Referring provider:  Felicia Tolliver, LAMIN CNP  [address] [place] [address]       RE: Camacho Bhagat  : 1965  BISI: 10/11/2017      Camacho Bhagat is a very pleasant 52 year old male status post liver transplantation here for follow-up of after exploratory laparotomy, lysis of adhesions, right hemicolectomy, end ileostomy, mucous fistula, and partial omentectomy, 2017 for retroperitoneal pericolonic air with sepsis after a recent colonoscopy with biopsies and associated colitis.     Interval history: The patient is now at rehabilitation because he still has 2 abdominal drains. He is able to care for his stoma for the most part. He is anxious to have reversal of his stoma. His appetite is good and he is getting around well. Outputs from the drains are serosanguinous and minimal.    PLEASE SEE NOTE BELOW FOR PHYSICAL EXAMINATION, REVIEW OF SYSTEMS, AND OTHER HISTORY.    Assessment/Plan: 52 year old male status post liver transplantation here for follow-up of after exploratory laparotomy, lysis of adhesions, right hemicolectomy, end ileostomy, mucous fistula, and partial omentectomy, 2017 for retroperitoneal pericolonic air with sepsis after a recent colonoscopy with biopsies and associated colitis. Overall very improved.      1. IR drains remain times two. We will arrange follow-up with IR for sinogram. And then possible removal.     2. Recovery is good. He would like to see wound care ostomy nursing which we will arrange as he is interested in something protective over his appliance so his dog will not traumatize the stoma.     3. He wishes for reversal soon. I have explained that we would recommend not until 6 months postoperatively. He had very significant adhesions and inflammation at the time of surgery. He still has drains in place. Although he is doing well, the ideal is to wait for the 6 months. We could pencil in a date in late January or  early February -    Procedure: Exploratory laparotomy, lysis of adhesions, takedown end ileostomy and mucous fistula with ileocolic anastomosis.    No bowel preparation, operating room position: supine, to Preoperative Assessment Center (PAC) prior to surgery. No other preoperative diagnostic imaging.     This is a postoperative visit. Total time was 25 minutes, over 50% was spent counseling the patient.       PLEASE SEE NOTE BELOW FOR PHYSICAL EXAMINATION, REVIEW OF SYSTEMS, AND OTHER HISTORY.      Sara Dinh MD  Colon and Rectal Surgery Attending  Department of Surgery  [facility]    -------------------------------------------------------------------------------------------------------------------    Medical history:  Past Medical History:   Diagnosis Date     Cancer (H)      Depressive disorder, not elsewhere classified      Esophageal reflux      Fibromyalgia 1/2009    dx with Dr Benitez( [facility])     History of thrombophlebitis      Osteoarthritis      Other acute embolism veins 11/01    Deep vein thrombophlebitis, filter placed     Other and unspecified hyperlipidemia      Other chronic nonalcoholic liver disease     Fatty liver      Other testicular hypofunction      Pneumonia, organism unspecified(486) 10-01    Included ARDS, sepsis, and  acute renal failure; hospitalized     Rheumatoid arthritis(714.0)      Type II or unspecified type diabetes mellitus without mention of complication, not stated as uncontrolled 11/01    Managed by endocrinology     Unspecified essential hypertension 11/01    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     Unspecified sleep apnea     Uses BiPAP       Surgical history:  Past Surgical History:   Procedure Laterality Date     BENCH LIVER N/A 3/4/2017    Procedure: BENCH LIVER;  Surgeon: Jovan Tran MD;  Location: [facility] OR     C NONSPECIFIC PROCEDURE      tracheostomy     C NONSPECIFIC PROCEDURE      repair of deviated septum      C NONSPECIFIC PROCEDURE      Rt knee arthroscopy     C TOTAL KNEE ARTHROPLASTY  2008    Right knee arthroscopy     CHOLECYSTECTOMY       COLONOSCOPY N/A 2017    Procedure: COMBINED COLONOSCOPY, SINGLE OR MULTIPLE BIOPSY/POLYPECTOMY BY BIOPSY;  Colonoscopy;  Surgeon: Izaiah Montes MD;  Location: [facility] GI     ESOPHAGOSCOPY, GASTROSCOPY, DUODENOSCOPY (EGD), COMBINED N/A 2016    Procedure: COMBINED ESOPHAGOSCOPY, GASTROSCOPY, DUODENOSCOPY (EGD), BIOPSY SINGLE OR MULTIPLE;  Surgeon: Trent Pederson MD;  Location:  GI     ESOPHAGOSCOPY, GASTROSCOPY, DUODENOSCOPY (EGD), COMBINED N/A 2017    Procedure: COMBINED ESOPHAGOSCOPY, GASTROSCOPY, DUODENOSCOPY (EGD);;  Surgeon: Los Wynn MD;  Location: [facility] GI     LAPAROTOMY EXPLORATORY N/A 2017    Procedure: LAPAROTOMY EXPLORATORY;  Exploratory Laparotomy, washout;  Surgeon: Tip Zhang MD;  Location: [facility] OR     LAPAROTOMY EXPLORATORY N/A 2017    Procedure: LAPAROTOMY EXPLORATORY;  Exploratory Laparotomy, Washout with closure.;  Surgeon: Tip Zhang MD;  Location: [facility] OR     LAPAROTOMY EXPLORATORY N/A 2017    Procedure: LAPAROTOMY EXPLORATORY;  Exploratory Laparotomy, Right angelique-colectomy, end ileostomy, mucosal fistula, partial omentectomy;  Surgeon: Sara Dinh MD;  Location: [facility] OR     TRANSPLANT LIVER RECIPIENT  DONOR N/A 3/4/2017    Procedure: TRANSPLANT LIVER RECIPIENT  DONOR;  Surgeon: Jovan Tran MD;  Location: [facility] OR       Problem list:  Patient Active Problem List    Diagnosis Date Noted     Low hemoglobin 2017     Priority: Medium     Retroperitoneal abscess (H) 2017     Priority: Medium     Peritonitis and retroperitoneal infections (H) 2017     Priority: Medium     Delirium, acute 2017     Priority: Medium     Pancytopenia (H) 2017     Priority: Medium     EJ (acute kidney injury) (H) 2017     Priority: Medium     Hyperkalemic  renal tubular acidosis 08/25/2017     Priority: Medium     Inadequate oral intake 08/25/2017     Priority: Medium     Gangrene of finger (H) 08/25/2017     Priority: Medium     Ischemia of both lower extremities 08/25/2017     Priority: Medium     Distal ischemia due to shock/high pressor requirements       S/P liver transplant (H) 07/26/2017     Priority: Medium     Sepsis (H) 07/04/2017     Priority: Medium     Typhlitis 07/04/2017     Priority: Medium     Neutropenic colitis (H) 07/04/2017     Priority: Medium     Neck pain 05/03/2017     Priority: Medium     Back pain 05/03/2017     Priority: Medium     Immunosuppressed status (H) 03/10/2017     Priority: Medium     Liver transplant recipient (H) 03/04/2017     Priority: Medium     Hyperkalemia 02/14/2017     Priority: Medium     Uncontrolled type 2 diabetes mellitus with hyperglycemia, with long-term current use of insulin (H) 11/10/2016     Priority: Medium     Hepatocellular carcinoma (H) 01/27/2016     Priority: Medium     Osteoarthritis 01/18/2015     Priority: Medium     Rheumatoid arthritis (H) 01/18/2015     Priority: Medium     Pain medication agreement signed - Shay Sick 2/7/14 02/07/2014     Priority: Medium     Medication refill- do not delete  11/13/2013     Priority: Medium     Diabetes mellitus with neurological manifestation (H) 09/07/2012     Priority: Medium     Knee pain 09/16/2011     Priority: Medium     Primary localized osteoarthrosis, lower leg 09/16/2011     Priority: Medium     Fibromyalgia 08/15/2011     Priority: Medium     OA (osteoarthritis) 08/15/2011     Priority: Medium     Sicca syndrome (H) 08/15/2011     Priority: Medium     HYPERLIPIDEMIA LDL GOAL <100 10/31/2010     Priority: Medium     Testicular hypofunction      Priority: Medium     Problem list name updated by automated process. Provider to review       Sleep apnea      Priority: Medium     Uses BiPAP  Problem list name updated by automated process. Provider to review        Hyperlipidemia 10/10/2005     Priority: Medium     Problem list name updated by automated process. Provider to review       Depressive disorder, not elsewhere classified 05/29/2003     Priority: Medium     Esophageal reflux 10/08/2002     Priority: Medium     Carpal tunnel syndrome 07/08/2002     Priority: Medium     Essential hypertension 07/08/2002     Priority: Medium     Problem list name updated by automated process. Provider to review       Personal history of thrombophlebitis 07/08/2002     Priority: Medium       Medications:  Current Outpatient Prescriptions   Medication Sig Dispense Refill     sodium chloride, PF, 0.9% PF flush Flush 12 french drain with 10mls NS BID, flush 24 Fr drain with 20mls NS  mL 3     furosemide (LASIX) 20 MG tablet Take 1 tablet (20 mg) by mouth daily 30 tablet 3     sodium polystyrene (KAYEXALATE) 0.25 GM/ML suspension Take 240 mLs (60 g) by mouth once for 1 dose Avoid taking other oral medications 3 hours before or after this medication. 240 mL 0     fludrocortisone (FLORINEF) 0.1 MG tablet Take 2 tablets (0.2 mg) by mouth daily For elevated potassium 60 tablet 3     oxyCODONE (ROXICODONE) 10 MG IR tablet Take 0.5 tablets (5 mg) by mouth every 4 hours as needed for moderate to severe pain 30 tablet 0     acetaminophen (TYLENOL) 325 MG tablet Take 2 tablets (650 mg) by mouth every 4 hours as needed for mild pain 100 tablet 3     loperamide (IMODIUM) 2 MG capsule Take 2 capsules (4 mg) by mouth 2 times daily 120 capsule 3     amLODIPine (NORVASC) 5 MG tablet Take 2 tablets (10 mg) by mouth daily 60 tablet 3     cyclobenzaprine (FLEXERIL) 10 MG tablet Take 1 tablet (10 mg) by mouth 3 times daily as needed for muscle spasms 90 tablet 0     pantoprazole (PROTONIX) 40 MG EC tablet Take 1 tablet (40 mg) by mouth daily 30 tablet 5     sodium bicarbonate 650 MG tablet Take 1 tablet (650 mg) by mouth 3 times daily 90 tablet 5     tadalafil (CIALIS) 5 MG tablet Take 1 tablet (5  mg) by mouth daily Never use with nitroglycerin, terazosin or doxazosin. 30 tablet 11     GABAPENTIN PO Take 300 mg by mouth 3 times daily       tacrolimus (GENERIC EQUIVALENT) 5 MG capsule Take 1 capsule (5 mg) by mouth every 12 hours *take with 2 mg capsules for total dose 7 mg every 12 hours 60 capsule 11     tacrolimus (GENERIC EQUIVALENT) 1 MG capsule Take 2 capsules (2 mg) by mouth every 12 hours *take with 5 mg capsules for total of 7 mg every 12 hours 60 capsule 11     insulin glargine (LANTUS) 100 UNIT/ML injection Inject 50 Units Subcutaneous every morning       linagliptin (TRADJENTA) 5 MG TABS tablet Take 5 mg by mouth daily         Allergies:  Allergies   Allergen Reactions     Erythromycin GI Disturbance     Vioxx      Nausea, vomiting       Family history:  Family History   Problem Relation Age of Onset     DIABETES Father      Hypertension Father      Substance Abuse Father      Arthritis Mother      CANCER Mother      Thyroid     Thyroid Disease Mother      Other Cancer Mother      Colon Cancer No family hx of      Hyperlipidemia No family hx of      Coronary Artery Disease No family hx of      CEREBROVASCULAR DISEASE No family hx of      Breast Cancer No family hx of      Prostate Cancer No family hx of        Social history:  Social History     Social History     Marital status:      Spouse name: N/A     Number of children: 1     Years of education: N/A     Occupational History            Social History Main Topics     Smoking status: Former Smoker     Smokeless tobacco: Former User     Types: Chew     Quit date: 10/8/2015      Comment: Has used chewing tobacco since age 15 , chewed for 20yrs      Alcohol use No      Comment: last drink about a year ago (quit 2001)     Drug use: No     Sexual activity: Yes     Partners: Female     Other Topics Concern     Not on file     Social History Narrative    uSED TO BE      Back in school now              Nursing Notes:   Joselin Stovall RN  10/11/2017  8:30 AM  Signed  Chief Complaint   Patient presents with     RECHECK     post op surgery 7/26/17       Vitals:    10/11/17 0822   BP: 152/79   BP Location: Right arm   Patient Position: Chair   Cuff Size: Adult Regular   Pulse: 90   Temp: 98.3  F (36.8  C)   TempSrc: Oral   SpO2: 95%   Weight: 187 lb 12.8 oz   Height: 6'       Body mass index is 25.47 kg/(m^2).      Joselin Stovall RN                           Physical Examination:  /79 (BP Location: Right arm, Patient Position: Chair, Cuff Size: Adult Regular)  Pulse 90  Temp 98.3  F (36.8  C) (Oral)  Ht 6'  Wt 187 lb 12.8 oz  SpO2 95%  BMI 25.47 kg/m2  General: Pleasant, no acute distress  Abdomen: Soft, nontender, nondistended. No hepatosplenomegaly, no masses.  Well healed midline and end ileostomy. Mucous fistula in superior aspect of midline. 2 IR drains with serosanguinous fluid.

## 2017-10-16 ENCOUNTER — HOSPITAL ENCOUNTER (OUTPATIENT)
Dept: ULTRASOUND IMAGING | Facility: CLINIC | Age: 52
Discharge: HOME | End: 2017-10-16
Attending: TRANSPLANT SURGERY | Admitting: TRANSPLANT SURGERY
Payer: COMMERCIAL

## 2017-10-16 ENCOUNTER — ALLIED HEALTH/NURSE VISIT (OUTPATIENT)
Dept: TRANSPLANT | Facility: CLINIC | Age: 52
End: 2017-10-16
Attending: TRANSPLANT SURGERY
Payer: COMMERCIAL

## 2017-10-16 ENCOUNTER — TELEPHONE (OUTPATIENT)
Dept: TRANSPLANT | Facility: CLINIC | Age: 52
End: 2017-10-16

## 2017-10-16 ENCOUNTER — INFUSION THERAPY VISIT (OUTPATIENT)
Dept: INFUSION THERAPY | Facility: CLINIC | Age: 52
End: 2017-10-16
Attending: TRANSPLANT SURGERY
Payer: COMMERCIAL

## 2017-10-16 VITALS
TEMPERATURE: 99.9 F | SYSTOLIC BLOOD PRESSURE: 141 MMHG | HEART RATE: 87 BPM | RESPIRATION RATE: 20 BRPM | DIASTOLIC BLOOD PRESSURE: 68 MMHG

## 2017-10-16 VITALS — SYSTOLIC BLOOD PRESSURE: 143 MMHG | HEART RATE: 83 BPM | DIASTOLIC BLOOD PRESSURE: 84 MMHG

## 2017-10-16 DIAGNOSIS — Z94.4 S/P LIVER TRANSPLANT (H): ICD-10-CM

## 2017-10-16 DIAGNOSIS — Z79.60 LONG-TERM USE OF IMMUNOSUPPRESSANT MEDICATION: ICD-10-CM

## 2017-10-16 DIAGNOSIS — B25.9 CMV (CYTOMEGALOVIRUS INFECTION) (H): ICD-10-CM

## 2017-10-16 DIAGNOSIS — D72.819 DECREASED WHITE BLOOD CELL COUNT: ICD-10-CM

## 2017-10-16 DIAGNOSIS — Z94.4 HISTORY OF LIVER TRANSPLANT (H): ICD-10-CM

## 2017-10-16 DIAGNOSIS — E87.5 SERUM POTASSIUM ELEVATED: ICD-10-CM

## 2017-10-16 DIAGNOSIS — Z94.4 STATUS POST LIVER TRANSPLANTATION (H): ICD-10-CM

## 2017-10-16 DIAGNOSIS — R79.89 ELEVATED SERUM CREATININE: Primary | ICD-10-CM

## 2017-10-16 DIAGNOSIS — Z94.4 STATUS POST LIVER TRANSPLANTATION (H): Primary | ICD-10-CM

## 2017-10-16 DIAGNOSIS — N18.30 CHRONIC KIDNEY DISEASE, STAGE 3 (MODERATE): Primary | ICD-10-CM

## 2017-10-16 DIAGNOSIS — B25.9 CYTOMEGALOVIRUS INFECTION, UNSPECIFIED CYTOMEGALOVIRAL INFECTION TYPE (H): ICD-10-CM

## 2017-10-16 LAB
ALBUMIN SERPL-MCNC: 2.7 G/DL (ref 3.4–5)
ALP SERPL-CCNC: 265 U/L (ref 40–150)
ALT SERPL W P-5'-P-CCNC: 27 U/L (ref 0–70)
ANION GAP SERPL CALCULATED.3IONS-SCNC: 8 MMOL/L (ref 3–14)
AST SERPL W P-5'-P-CCNC: 44 U/L (ref 0–45)
BASOPHILS # BLD AUTO: 0 10E9/L (ref 0–0.2)
BASOPHILS NFR BLD AUTO: 0.1 %
BILIRUB DIRECT SERPL-MCNC: 0.2 MG/DL (ref 0–0.2)
BILIRUB SERPL-MCNC: 0.6 MG/DL (ref 0.2–1.3)
BUN SERPL-MCNC: 38 MG/DL (ref 7–30)
CALCIUM SERPL-MCNC: 8.7 MG/DL (ref 8.5–10.1)
CHLORIDE SERPL-SCNC: 108 MMOL/L (ref 94–109)
CO2 SERPL-SCNC: 18 MMOL/L (ref 20–32)
CREAT SERPL-MCNC: 1.97 MG/DL (ref 0.66–1.25)
DIFFERENTIAL METHOD BLD: NORMAL
EOSINOPHIL # BLD AUTO: 0.1 10E9/L (ref 0–0.7)
EOSINOPHIL NFR BLD AUTO: 1.8 %
ERYTHROCYTE [DISTWIDTH] IN BLOOD BY AUTOMATED COUNT: 15 % (ref 10–15)
GFR SERPL CREATININE-BSD FRML MDRD: 36 ML/MIN/1.7M2
GLUCOSE SERPL-MCNC: 217 MG/DL (ref 70–99)
HCT VFR BLD AUTO: 23.3 % (ref 40–53)
HGB BLD-MCNC: 7.6 G/DL (ref 13.3–17.7)
IMM GRANULOCYTES # BLD: 0.2 10E9/L (ref 0–0.4)
IMM GRANULOCYTES NFR BLD: 2.3 %
LYMPHOCYTES # BLD AUTO: 2.1 10E9/L (ref 0.8–5.3)
LYMPHOCYTES NFR BLD AUTO: 30.8 %
MAGNESIUM SERPL-MCNC: 1.6 MG/DL (ref 1.6–2.3)
MCH RBC QN AUTO: 31 PG (ref 26.5–33)
MCHC RBC AUTO-ENTMCNC: 32.6 G/DL (ref 31.5–36.5)
MCV RBC AUTO: 95 FL (ref 78–100)
MONOCYTES # BLD AUTO: 0.5 10E9/L (ref 0–1.3)
MONOCYTES NFR BLD AUTO: 7 %
NEUTROPHILS # BLD AUTO: 3.9 10E9/L (ref 1.6–8.3)
NEUTROPHILS NFR BLD AUTO: 58 %
NRBC # BLD AUTO: 0 10*3/UL
NRBC BLD AUTO-RTO: 0 /100
PHOSPHATE SERPL-MCNC: 3.8 MG/DL (ref 2.5–4.5)
PLATELET # BLD AUTO: 84 10E9/L (ref 150–450)
POTASSIUM SERPL-SCNC: 6.2 MMOL/L (ref 3.4–5.3)
PROT SERPL-MCNC: 8 G/DL (ref 6.8–8.8)
RBC # BLD AUTO: 2.45 10E12/L (ref 4.4–5.9)
SODIUM SERPL-SCNC: 134 MMOL/L (ref 133–144)
TACROLIMUS BLD-MCNC: 3.6 UG/L (ref 5–15)
TME LAST DOSE: ABNORMAL H
WBC # BLD AUTO: 6.8 10E9/L (ref 4–11)

## 2017-10-16 PROCEDURE — 76700 US EXAM ABDOM COMPLETE: CPT

## 2017-10-16 PROCEDURE — 80048 BASIC METABOLIC PNL TOTAL CA: CPT | Performed by: INTERNAL MEDICINE

## 2017-10-16 PROCEDURE — 36415 COLL VENOUS BLD VENIPUNCTURE: CPT | Performed by: INTERNAL MEDICINE

## 2017-10-16 PROCEDURE — 85027 COMPLETE CBC AUTOMATED: CPT | Performed by: INTERNAL MEDICINE

## 2017-10-16 PROCEDURE — 96374 THER/PROPH/DIAG INJ IV PUSH: CPT

## 2017-10-16 PROCEDURE — 83735 ASSAY OF MAGNESIUM: CPT | Performed by: INTERNAL MEDICINE

## 2017-10-16 PROCEDURE — 96361 HYDRATE IV INFUSION ADD-ON: CPT

## 2017-10-16 PROCEDURE — 85004 AUTOMATED DIFF WBC COUNT: CPT | Performed by: INTERNAL MEDICINE

## 2017-10-16 PROCEDURE — 84100 ASSAY OF PHOSPHORUS: CPT | Performed by: INTERNAL MEDICINE

## 2017-10-16 PROCEDURE — 80197 ASSAY OF TACROLIMUS: CPT | Performed by: INTERNAL MEDICINE

## 2017-10-16 PROCEDURE — 25000128 H RX IP 250 OP 636: Performed by: TRANSPLANT SURGERY

## 2017-10-16 PROCEDURE — 80076 HEPATIC FUNCTION PANEL: CPT | Performed by: INTERNAL MEDICINE

## 2017-10-16 RX ORDER — AMOXICILLIN AND CLAVULANATE POTASSIUM 500; 125 MG/1; MG/1
1 TABLET, FILM COATED ORAL 3 TIMES DAILY
Qty: 30 TABLET | Refills: 0 | Status: ON HOLD | OUTPATIENT
Start: 2017-10-16 | End: 2017-10-27

## 2017-10-16 RX ORDER — FUROSEMIDE 10 MG/ML
20 INJECTION INTRAMUSCULAR; INTRAVENOUS ONCE
Status: CANCELLED
Start: 2017-10-16 | End: 2017-10-16

## 2017-10-16 RX ORDER — FUROSEMIDE 10 MG/ML
20 INJECTION INTRAMUSCULAR; INTRAVENOUS ONCE
Status: COMPLETED | OUTPATIENT
Start: 2017-10-16 | End: 2017-10-16

## 2017-10-16 RX ADMIN — SODIUM CHLORIDE 1000 ML: 9 INJECTION, SOLUTION INTRAVENOUS at 14:49

## 2017-10-16 RX ADMIN — FUROSEMIDE 20 MG: 10 INJECTION, SOLUTION INTRAMUSCULAR; INTRAVENOUS at 15:11

## 2017-10-16 NOTE — NURSING NOTE
Reason for Visit    Patient seen at the request of Dr. Hernández for volume assessment.    Medication Review    Medication review completed. Patient reports taking lasix 20 mg every other day, not daily.      Assessment    /84 (BP Location: Right arm, Patient Position: Standing, Cuff Size: Adult Small)  Pulse 83    Orthostatic blood pressures done: Lying- 164/85, Sitting- 160/89, Standing- 143/84. Heart rate in the 80s. Patient denies any dizziness when changing positions.      Respiratory: Lung sounds are clear. He reports no shortness of breath.     Edema: He has no lower extremity edema. His weight is 183 lbs, which is down about 10 lbs since his last nephrology appointment on 9/20/17.     Patient reports drinking about 1 gallon of water per day and reports good urine output. Urinates 4-5 times per day. Has a colostomy. Recently had 3 abdominal drains removed, but reported little output out of those.    Patient was NPO overnight due to an ultrasound that was done this morning.     His Cr today is 1.9 and his potassium is 6.2. His liver transplant team is arranging for patient to get IV fluids in [facility] or at [facility]. I reviewed the labs with the patient and he is aware that his transplant coordinator will call him about the infusion appointment. He was advised not to leave until he hears back from her.         Collaboration    Dr. Hernández updated.     Plan    1. Will review with Dr. Hernández and follow up with patient on any changes or recommendations     Amount of time spent with patient 20 minutes.    Patient was given an opportunity to ask questions and have those questions answered to his satisfaction.  Patient verbalized understanding of instructions provided and agreed to plan of care.    Quang Conteh, RN, BAN  Nephrology RN Care Coordinator

## 2017-10-16 NOTE — MR AVS SNAPSHOT
After Visit Summary   10/16/2017    Camacho Bhagat    MRN: [id number]           Patient Information     Date Of Birth          1965        Visit Information        Provider Department      10/16/2017 2:00 PM RH INFUSION CHAIR 5 [facility]fusion [facility]        Today's Diagnoses     Elevated serum creatinine    -  1    Serum potassium elevated        S/P liver transplant (H)           Follow-ups after your visit        Your next 10 appointments already scheduled     Oct 23, 2017  9:30 AM CDT   (Arrive by 9:15 AM)   Return Visit with Shay Kirkpatrick MD   [facility] ([facility])    [address] [place] [address]  4th [facility] [address]   [phone]            Dec 01, 2017  8:45 AM CST   (Arrive by 8:30 AM)   Return Liver Transplant with Toñito Camejo MD   [facility] Hepatology ([facility])    [address] [place] [address]  3rd [facility] [address]   [phone]            Dec 13, 2017 10:30 AM CST   (Arrive by 10:15 AM)   Return Visit with Sara Dinh MD   [facility] Colon and Rectal Surgery ([facility])    [address] [place] [address]  4th [facility] [address]   [phone]              Who to contact     If you have questions or need follow up information about today's clinic visit or your schedule please contact [facility] IN[facility] directly at [phone].  Normal or non-critical lab and imaging results will be communicated to you by MyChart, letter or phone within 4 business days after the clinic has received the results. If you do not hear from us within 7 days, please contact the clinic through MyChart or phone. If you have a critical or abnormal lab result, we will notify you by phone as soon as possible.  Submit refill requests through [employer] or call your pharmacy and they will forward the refill request to us. Please allow 3  business days for your refill to be completed.          Additional Information About Your Visit        MyChart Information     [employer] gives you secure access to your electronic health record. If you see a primary care provider, you can also send messages to your care team and make appointments. If you have questions, please call your primary care clinic.  If you do not have a primary care provider, please call [phone] and they will assist you.        Care EveryWhere ID     This is your Care EveryWhere ID. This could be used by other organizations to access your [place] medical records  [id number]        Your Vitals Were     Pulse Temperature Respirations             87 99.9  F (37.7  C) (Tympanic) 20          Blood Pressure from Last 3 Encounters:   10/16/17 141/68   10/16/17 143/84   10/13/17 146/81    Weight from Last 3 Encounters:   10/13/17 84.6 kg (186 lb 6.4 oz)   10/11/17 85.2 kg (187 lb 12.8 oz)   10/02/17 87.2 kg (192 lb 3.2 oz)              Today, you had the following     No orders found for display         Today's Medication Changes          These changes are accurate as of: 10/16/17  3:56 PM.  If you have any questions, ask your nurse or doctor.               Start taking these medicines.        Dose/Directions    amoxicillin-clavulanate 500-125 MG per tablet   Commonly known as:  AUGMENTIN   Used for:  Status post liver transplantation (H), Long-term use of immunosuppressant medication   Started by:  Tip Zhang MD        Dose:  1 tablet   Take 1 tablet by mouth 3 times daily   Quantity:  30 tablet   Refills:  0         Stop taking these medicines if you haven't already. Please contact your care team if you have questions.     pantoprazole 40 MG EC tablet   Commonly known as:  PROTONIX                Where to get your medicines      These medications were sent to [facility] [id number] [place] [address], [place] - [address] [facility] AT [facility] [address]  [address] [facility]  [address]     Phone:  [phone]     amoxicillin-clavulanate 500-125 MG per tablet                Primary Care Provider    Shay Kirkpatrick MD       PWB  MD [address]        Equal Access to Services     [facility] : Todd vo maxime melinda Carlisle, wakpda luqadaha, ginnyta kazeb dolan, devi ellischristal . So [facility] [phone].    ATENCIÓN: Si habla español, tiene a zheng disposición servicios gratuitos de asistencia lingüística. Llame al [phone].    We comply with applicable federal civil rights laws and [place] laws. We do not discriminate on the basis of race, color, national origin, age, disability, sex, sexual orientation, or gender identity.            Thank you!     Thank you for choosing [facility] INFUSION SERVICES  for your care. Our goal is always to provide you with excellent care. Hearing back from our patients is one way we can continue to improve our services. Please take a few minutes to complete the written survey that you may receive in the mail after your visit with us. Thank you!             Your Updated Medication List - Protect others around you: Learn how to safely use, store and throw away your medicines at www.disposemymeds.org.          This list is accurate as of: 10/16/17  3:56 PM.  Always use your most recent med list.                   Brand Name Dispense Instructions for use Diagnosis    acetaminophen 325 MG tablet    TYLENOL    100 tablet    Take 2 tablets (650 mg) by mouth every 4 hours as needed for mild pain    Osteoarthritis, unspecified osteoarthritis type, unspecified site       amLODIPine 5 MG tablet    NORVASC    60 tablet    Take 2 tablets (10 mg) by mouth daily    History of liver transplant (H), Elevated blood pressure reading, Long-term use of immunosuppressant medication       amoxicillin-clavulanate 500-125 MG per tablet    AUGMENTIN    30 tablet    Take 1 tablet by mouth 3 times daily    Status post liver transplantation  (H), Long-term use of immunosuppressant medication       cyclobenzaprine 10 MG tablet    FLEXERIL    90 tablet    Take 1 tablet (10 mg) by mouth 3 times daily as needed for muscle spasms    Immunosuppression (H)       fludrocortisone 0.1 MG tablet    FLORINEF    60 tablet    Take 2 tablets (0.2 mg) by mouth daily For elevated potassium    History of liver transplant (H), Long-term use of immunosuppressant medication       furosemide 20 MG tablet    LASIX    30 tablet    Take 1 tablet (20 mg) by mouth daily    Hyperkalemia       GABAPENTIN PO      Take 300 mg by mouth 3 times daily        insulin glargine 100 UNIT/ML injection    LANTUS     Inject 50 Units Subcutaneous every morning        linagliptin 5 MG Tabs tablet    TRADJENTA     Take 5 mg by mouth daily        loperamide 2 MG capsule    IMODIUM    120 capsule    Take 2 capsules (4 mg) by mouth 2 times daily    S/P right hemicolectomy       OMEPRAZOLE PO      Take by mouth every morning        oxyCODONE 10 MG IR tablet    ROXICODONE    30 tablet    Take 0.5 tablets (5 mg) by mouth every 4 hours as needed for moderate to severe pain    History of liver transplant (H)       sodium bicarbonate 650 MG tablet     90 tablet    Take 1 tablet (650 mg) by mouth 3 times daily    Liver transplant recipient (H)       sodium chloride (PF) 0.9% PF flush     600 mL    Flush 12 french drain with 10mls NS BID, flush 24 Fr drain with 20mls NS BID    Intra-abdominal abscess (H)       sodium polystyrene 0.25 GM/ML suspension    KAYEXALATE    240 mL    Take 240 mLs (60 g) by mouth once for 1 dose Avoid taking other oral medications 3 hours before or after this medication.        * tacrolimus 5 MG capsule    GENERIC EQUIVALENT    60 capsule    Take 1 capsule (5 mg) by mouth every 12 hours *take with 2 mg capsules for total dose 7 mg every 12 hours    Liver transplant recipient (H)       * tacrolimus 1 MG capsule    GENERIC EQUIVALENT    60 capsule    Take 2 capsules (2 mg) by mouth  every 12 hours *take with 5 mg capsules for total of 7 mg every 12 hours    Liver transplant recipient (H)       tadalafil 5 MG tablet    CIALIS    30 tablet    Take 1 tablet (5 mg) by mouth daily Never use with nitroglycerin, terazosin or doxazosin.    Drug-induced erectile dysfunction       * Notice:  This list has 2 medication(s) that are the same as other medications prescribed for you. Read the directions carefully, and ask your doctor or other care provider to review them with you.

## 2017-10-16 NOTE — NURSING NOTE
Here for post liver transplant follow-up.  Pt expressing many concerns about abdominal drains, asking to have last drain removed, has one drain and ostomy present.   Camacho expressing frustrations, has blackened finger tip, right hand and blackened right toe.  (per Camacho this happened with hospitalization when he had his bowel surgery, and had a low BP)      Current immunosuppression:    Tacrolimus  6mg Q 12, did not get message to increase tacrolimus dose to 7mg  Q 12    Med changes: started on augmentin x 10 days for blackened toe, finger, noted redness and tenderness aroung both..      Lab frequency:   Weekly      Follow-up:  Hepatology , pt requests to schedule with Dr.Lake clinton.  He also scheduled to see vascular surgery for his toe and finger.  This referral was ordered by pcp clinic.   Camacho has not heard about the appt yet.  Plan for liver biopsy per orders ,  Labs improved today, will recheck with  about proceeding with biopsy.

## 2017-10-16 NOTE — TELEPHONE ENCOUNTER
DATE:  10/16/2017   TIME OF RECEIPT FROM LAB:  11:13 am  LAB TEST:  K+  LAB VALUE:  6.2  RESULTS GIVEN WITH READ-BACK TO (PROVIDER):  Abril Doty  TIME LAB VALUE REPORTED TO PROVIDER:   11:20  (sent page)

## 2017-10-16 NOTE — PROGRESS NOTES
Infusion Nursing Note:  Camacho Bhagat presents today for IVF and Lasix.    Patient seen by provider today: No   present during visit today: Not Applicable.    Note: Liver transplant 3/4/17 Liver Cancer 2016.    Intravenous Access:  Peripheral IV placed Pt states HI K+(6.2) giving fluids and lasix.    Treatment Conditions:  Not Applicable.      Post Infusion Assessment:  Patient tolerated infusion without incident.  Blood return noted pre and post infusion.  Site patent and intact, free from redness, edema or discomfort.  Access discontinued per protocol.    Discharge Plan:   Discharge instructions reviewed with: Patient.  Patient and/or family verbalized understanding of discharge instructions and all questions answered.  Patient discharged in stable condition accompanied by: self and mother.  Departure Mode: Ambulatory.    Cinda Diggs RN

## 2017-10-16 NOTE — TELEPHONE ENCOUNTER
Per , pt's potassium 6.2, creatinine 1.97, plan for IV infusion, with 20mg lasix.   Spoke with Camacho, and with ATC, who can't accomodate him until after 4pm.   Plan made for pt to have IV infusion at [facility] at 2pm today for infusion.    Camacho verbalized understanding of plan.

## 2017-10-16 NOTE — MR AVS SNAPSHOT
After Visit Summary   10/16/2017    Camacho Bhagat    MRN: [id number]           Patient Information     Date Of Birth          1965        Visit Information        Provider Department      10/16/2017 12:00 PM  NEPHROLOGY NURSE [facility] Solid Organ Transplant        Today's Diagnoses     Chronic kidney disease, stage 3 (moderate)    -  1       Follow-ups after your visit        Your next 10 appointments already scheduled     Oct 16, 2017  1:10 PM CDT   XR HAND RIGHT 2 VIEWS with [id number]   [facility] Orthopaedics XRay ([facility])    [address] [place] [id number] [phone]           Please bring a list of your current medicines to your exam. (Include vitamins, minerals and over-thecounter medicines.) Leave your valuables at home.  Tell your doctor if there is a chance you may be pregnant.  You do not need to do anything special for this exam.            Oct 23, 2017  9:30 AM CDT   (Arrive by 9:15 AM)   Return Visit with Shay Kirkpatrick MD   [facility] Primary [facility] Clinic ([facility])    [address] [place] [id number] [phone]            Dec 01, 2017  8:45 AM CST   (Arrive by 8:30 AM)   Return Liver Transplant with Toñito Camjeo MD   [facility] Hepatology ([facility])    [address] [place] [id number] [phone]            Dec 13, 2017 10:30 AM CST   (Arrive by 10:15 AM)   Return Visit with Sara Dinh MD   [facility] Colon and Rectal Surgery ([facility])    [address] [place] [id number] [phone]              Who to contact     If you have questions or need follow up information about today's clinic visit or your schedule please contact [facility] SOLID ORGAN TRANSPLANT directly at [phone].  Normal or non-critical lab and imaging results will be communicated to  you by MyChart, letter or phone within 4 business days after the clinic has received the results. If you do not hear from us within 7 days, please contact the clinic through [employer] or phone. If you have a critical or abnormal lab result, we will notify you by phone as soon as possible.  Submit refill requests through [email or web address] or call your pharmacy and they will forward the refill request to us. Please allow 3 business days for your refill to be completed.          Additional Information About Your Visit        [employer] Information     [email or web address] gives you secure access to your electronic health record. If you see a primary care provider, you can also send messages to your care team and make appointments. If you have questions, please call your primary care clinic.  If you do not have a primary care provider, please call [phone] and they will assist you.        Care EveryWhere ID     This is your Care EveryWhere ID. This could be used by other organizations to access your [place] medical records  [id number]        Your Vitals Were     Pulse                   83            Blood Pressure from Last 3 Encounters:   10/16/17 143/84   10/13/17 146/81   10/11/17 152/79    Weight from Last 3 Encounters:   10/13/17 84.6 kg (186 lb 6.4 oz)   10/11/17 85.2 kg (187 lb 12.8 oz)   10/02/17 87.2 kg (192 lb 3.2 oz)              Today, you had the following     No orders found for display         Today's Medication Changes          These changes are accurate as of: 10/16/17 12:54 PM.  If you have any questions, ask your nurse or doctor.               Start taking these medicines.        Dose/Directions    amoxicillin-clavulanate 500-125 MG per tablet   Commonly known as:  AUGMENTIN   Used for:  Status post liver transplantation (H), Long-term use of immunosuppressant medication   Started by:  Jovan Tran MD        Dose:  1 tablet   Take 1 tablet by mouth 3 times daily   Quantity:  30 tablet   Refills:  0             Where to get your medicines      These medications were sent to [facility] [id number] [place] [address], [place] - [address] [place] [address] AT [address] of [address]  [address] [facility] [address]     Phone:  [phone]     amoxicillin-clavulanate 500-125 MG per tablet                Primary Care Provider    Shay Kirkpatrick MD       PWANTOLIN GAR [address]        Equal Access to Services     [facility]: Hadii aad ku hadasho Soomaali, waaxda luqadaha, qaybta kaalmada adeegyada, waxay idiin hayaan adeeg kharash laNemesioaan . So [facility] [phone].    ATENCIÓN: Si brandt garcia, tiene a zheng disposición servicios gratuitos de asistencia lingüística. Llame al [phone].    We comply with applicable federal civil rights laws and [place] laws. We do not discriminate on the basis of race, color, national origin, age, disability, sex, sexual orientation, or gender identity.            Thank you!     Thank you for choosing [facility] SOL[facility] ORGAN TRANSPLANT  for your care. Our goal is always to provide you with excellent care. Hearing back from our patients is one way we can continue to improve our services. Please take a few minutes to complete the written survey that you may receive in the mail after your visit with us. Thank you!             Your Updated Medication List - Protect others around you: Learn how to safely use, store and throw away your medicines at www.disposemymeds.org.          This list is accurate as of: 10/16/17 12:54 PM.  Always use your most recent med list.                   Brand Name Dispense Instructions for use Diagnosis    acetaminophen 325 MG tablet    TYLENOL    100 tablet    Take 2 tablets (650 mg) by mouth every 4 hours as needed for mild pain    Osteoarthritis, unspecified osteoarthritis type, unspecified site       amLODIPine 5 MG tablet    NORVASC    60 tablet    Take 2 tablets (10 mg) by mouth daily    History of liver transplant (H), Elevated blood pressure reading, Long-term use of  immunosuppressant medication       amoxicillin-clavulanate 500-125 MG per tablet    AUGMENTIN    30 tablet    Take 1 tablet by mouth 3 times daily    Status post liver transplantation (H), Long-term use of immunosuppressant medication       cyclobenzaprine 10 MG tablet    FLEXERIL    90 tablet    Take 1 tablet (10 mg) by mouth 3 times daily as needed for muscle spasms    Immunosuppression (H)       fludrocortisone 0.1 MG tablet    FLORINEF    60 tablet    Take 2 tablets (0.2 mg) by mouth daily For elevated potassium    History of liver transplant (H), Long-term use of immunosuppressant medication       furosemide 20 MG tablet    LASIX    30 tablet    Take 1 tablet (20 mg) by mouth daily    Hyperkalemia       GABAPENTIN PO      Take 300 mg by mouth 3 times daily        insulin glargine 100 UNIT/ML injection    LANTUS     Inject 50 Units Subcutaneous every morning        linagliptin 5 MG Tabs tablet    TRADJENTA     Take 5 mg by mouth daily        loperamide 2 MG capsule    IMODIUM    120 capsule    Take 2 capsules (4 mg) by mouth 2 times daily    S/P right hemicolectomy       oxyCODONE 10 MG IR tablet    ROXICODONE    30 tablet    Take 0.5 tablets (5 mg) by mouth every 4 hours as needed for moderate to severe pain    History of liver transplant (H)       pantoprazole 40 MG EC tablet    PROTONIX    30 tablet    Take 1 tablet (40 mg) by mouth daily    S/P liver transplant (H)       sodium bicarbonate 650 MG tablet     90 tablet    Take 1 tablet (650 mg) by mouth 3 times daily    Liver transplant recipient (H)       sodium chloride (PF) 0.9% PF flush     600 mL    Flush 12 french drain with 10mls NS BID, flush 24 Fr drain with 20mls NS BID    Intra-abdominal abscess (H)       sodium polystyrene 0.25 GM/ML suspension    KAYEXALATE    240 mL    Take 240 mLs (60 g) by mouth once for 1 dose Avoid taking other oral medications 3 hours before or after this medication.        * tacrolimus 5 MG capsule    GENERIC EQUIVALENT     60 capsule    Take 1 capsule (5 mg) by mouth every 12 hours *take with 2 mg capsules for total dose 7 mg every 12 hours    Liver transplant recipient (H)       * tacrolimus 1 MG capsule    GENERIC EQUIVALENT    60 capsule    Take 2 capsules (2 mg) by mouth every 12 hours *take with 5 mg capsules for total of 7 mg every 12 hours    Liver transplant recipient (H)       tadalafil 5 MG tablet    CIALIS    30 tablet    Take 1 tablet (5 mg) by mouth daily Never use with nitroglycerin, terazosin or doxazosin.    Drug-induced erectile dysfunction       * Notice:  This list has 2 medication(s) that are the same as other medications prescribed for you. Read the directions carefully, and ask your doctor or other care provider to review them with you.

## 2017-10-16 NOTE — LETTER
10/16/2017       RE: Camacho Bhagat  [address]  [facility] [address]     Dear Colleague,    Thank you for referring your patient, Camacho Bhagat, to the [facility]GAN TRANSPL[facility] at [facility]. Please see a copy of my visit note below.    S/p hospitalization for CMV colitis and perforation, s/p colectomy, ileostomy, s/p drain placement    Doing ok.  Decreased output from MARICRUZ.  Pain in the finger at the site of necrosis, some discharge from the areas of necrosis around the toes    Af, vsst    A&Ox3  Healing incision  Minimal output in MARICRUZ  Tip of right index finger with gangrenous changes, no surrounding erythema but + pain  Right toes with gangrenous changes and wet wounds concerning for ifx  Good distal pulses    K 6.2, CR 1.97, WBC 6.8, CMV -, Prograf 3.6    A/P: 51 yo gentleman s/p CMV colitis, perforation, ICU/pressors, now recovering.  - graft function - good  - immunosuppression: prograf ~6, MMF  - start Augmentin  - X-rays of the foot and hand, foot is concerning for ifx/osteo  - d/c maricruz  - ASAP f/up with vascular surg  - hyperkalemia - fluid and lasix in [facility]    BABAR Zhang M.D.

## 2017-10-16 NOTE — MR AVS SNAPSHOT
After Visit Summary   10/16/2017    Camacho Bhagat    MRN: [id number]           Patient Information     Date Of Birth          1965        Visit Information        Provider Department      10/16/2017 11:30 AM Jovan Tran MD [facility] Solid Organ Transplant        Today's Diagnoses     Status post liver transplantation (H)    -  1    Long-term use of immunosuppressant medication        CMV (cytomegalovirus infection) (H)           Follow-ups after your visit        Your next 10 appointments already scheduled     Oct 16, 2017 12:00 PM CDT   Nurse Visit with  NEPHROLOGY NURSE   [facility] Solid Organ Transplant ([facility])    [address] [place] [address]  3rd [facility] [address]   [phone]            Oct 16, 2017 12:50 PM CDT   XR TOE RIGHT G/E 2 VIEWS with [email or web address]   [facility] Orthopaedics XRay ([facility])    [address] [place] [id number] [phone]           Please bring a list of your current medicines to your exam. (Include vitamins, minerals and over-thecounter medicines.) Leave your valuables at home.  Tell your doctor if there is a chance you may be pregnant.  You do not need to do anything special for this exam.            Oct 16, 2017  1:10 PM CDT   XR HAND RIGHT 2 VIEWS with [email or web address]   [facility] Orthopaedics XRay ([facility])    [address] [place] [id number] [phone]           Please bring a list of your current medicines to your exam. (Include vitamins, minerals and over-thecounter medicines.) Leave your valuables at home.  Tell your doctor if there is a chance you may be pregnant.  You do not need to do anything special for this exam.            Oct 23, 2017  9:30 AM CDT   (Arrive by 9:15 AM)   Return Visit with Shay Kirkpatrick MD   [facility] Primary Care Clinic ([facility])    [address] [place] [address]  Floor  [facility] [address]   [phone]            Dec 01, 2017  8:45 AM CST   (Arrive by 8:30 AM)   Return Liver Transplant with Toñito Camejo MD   [facility] Hepatology ([facility])    [address] [place] [address]  3rd Floor  [facility] [address]   [phone]            Dec 13, 2017 10:30 AM CST   (Arrive by 10:15 AM)   Return Visit with Sara Dinh MD   [facility] Colon and Rectal Surgery ([facility])    [address] [place] [address]  4th Floor  [facility] [address]   [phone]              Future tests that were ordered for you today     Open Future Orders        Priority Expected Expires Ordered    XR Toe Right G/E 2 Views Routine 10/16/2017 10/31/2017 10/16/2017    XR Hand Right 2 Views Routine 10/16/2017 10/31/2017 10/16/2017            Who to contact     If you have questions or need follow up information about today's clinic visit or your schedule please contact [facility] SOLID ORGAN TRANSPLANT directly at [phone].  Normal or non-critical lab and imaging results will be communicated to you by [employer], letter or phone within 4 business days after the clinic has received the results. If you do not hear from us within 7 days, please contact the clinic through [employer] or phone. If you have a critical or abnormal lab result, we will notify you by phone as soon as possible.  Submit refill requests through [employer] or call your pharmacy and they will forward the refill request to us. Please allow 3 business days for your refill to be completed.          Additional Information About Your Visit        [employer] Information     [employer] gives you secure access to your electronic health record. If you see a primary care provider, you can also send messages to your care team and make appointments. If you have questions, please call your primary care clinic.  If you do not have a primary care provider, please call [phone] and they will assist you.         Care EveryWhere ID     This is your Care EveryWhere ID. This could be used by other organizations to access your [place] medical records  [id number]         Blood Pressure from Last 3 Encounters:   10/13/17 146/81   10/11/17 152/79   10/02/17 146/90    Weight from Last 3 Encounters:   10/13/17 84.6 kg (186 lb 6.4 oz)   10/11/17 85.2 kg (187 lb 12.8 oz)   10/02/17 87.2 kg (192 lb 3.2 oz)                 Today's Medication Changes          These changes are accurate as of: 10/16/17 11:47 AM.  If you have any questions, ask your nurse or doctor.               Start taking these medicines.        Dose/Directions    amoxicillin-clavulanate 500-125 MG per tablet   Commonly known as:  AUGMENTIN   Used for:  Status post liver transplantation (H), Long-term use of immunosuppressant medication   Started by:  Jovan Tran MD        Dose:  1 tablet   Take 1 tablet by mouth 3 times daily   Quantity:  30 tablet   Refills:  0            Where to get your medicines      These medications were sent to [facility] [id number] [place] [address] AT [address]  [address] [facility] [address]     Phone:  [phone]     amoxicillin-clavulanate 500-125 MG per tablet                Primary Care Provider    Shay Kirkpatrick MD       PWB  MD [address]        Equal Access to Services     [facility] AH: Hadii aad ku hadasho Soomaali, waaxda luqadaha, qaybta kaalmada adeelenayada, devi craig . So [facility] [phone].    ATENCIÓN: Si habla español, tiene a zheng disposición servicios gratuitos de asistencia lingüística. Llame al [phone].    We comply with applicable federal civil rights laws and [place] laws. We do not discriminate on the basis of race, color, national origin, age, disability, sex, sexual orientation, or gender identity.            Thank you!     Thank you for choosing [facility] SOLID ORGAN TRANSPLANT  for your care. Our goal is always to provide  you with excellent care. Hearing back from our patients is one way we can continue to improve our services. Please take a few minutes to complete the written survey that you may receive in the mail after your visit with us. Thank you!             Your Updated Medication List - Protect others around you: Learn how to safely use, store and throw away your medicines at www.disposemymeds.org.          This list is accurate as of: 10/16/17 11:47 AM.  Always use your most recent med list.                   Brand Name Dispense Instructions for use Diagnosis    acetaminophen 325 MG tablet    TYLENOL    100 tablet    Take 2 tablets (650 mg) by mouth every 4 hours as needed for mild pain    Osteoarthritis, unspecified osteoarthritis type, unspecified site       amLODIPine 5 MG tablet    NORVASC    60 tablet    Take 2 tablets (10 mg) by mouth daily    History of liver transplant (H), Elevated blood pressure reading, Long-term use of immunosuppressant medication       amoxicillin-clavulanate 500-125 MG per tablet    AUGMENTIN    30 tablet    Take 1 tablet by mouth 3 times daily    Status post liver transplantation (H), Long-term use of immunosuppressant medication       cyclobenzaprine 10 MG tablet    FLEXERIL    90 tablet    Take 1 tablet (10 mg) by mouth 3 times daily as needed for muscle spasms    Immunosuppression (H)       fludrocortisone 0.1 MG tablet    FLORINEF    60 tablet    Take 2 tablets (0.2 mg) by mouth daily For elevated potassium    History of liver transplant (H), Long-term use of immunosuppressant medication       furosemide 20 MG tablet    LASIX    30 tablet    Take 1 tablet (20 mg) by mouth daily    Hyperkalemia       GABAPENTIN PO      Take 300 mg by mouth 3 times daily        insulin glargine 100 UNIT/ML injection    LANTUS     Inject 50 Units Subcutaneous every morning        linagliptin 5 MG Tabs tablet    TRADJENTA     Take 5 mg by mouth daily        loperamide 2 MG capsule    IMODIUM    120 capsule     Take 2 capsules (4 mg) by mouth 2 times daily    S/P right hemicolectomy       oxyCODONE 10 MG IR tablet    ROXICODONE    30 tablet    Take 0.5 tablets (5 mg) by mouth every 4 hours as needed for moderate to severe pain    History of liver transplant (H)       pantoprazole 40 MG EC tablet    PROTONIX    30 tablet    Take 1 tablet (40 mg) by mouth daily    S/P liver transplant (H)       sodium bicarbonate 650 MG tablet     90 tablet    Take 1 tablet (650 mg) by mouth 3 times daily    Liver transplant recipient (H)       sodium chloride (PF) 0.9% PF flush     600 mL    Flush 12 french drain with 10mls NS BID, flush 24 Fr drain with 20mls NS BID    Intra-abdominal abscess (H)       sodium polystyrene 0.25 GM/ML suspension    KAYEXALATE    240 mL    Take 240 mLs (60 g) by mouth once for 1 dose Avoid taking other oral medications 3 hours before or after this medication.        * tacrolimus 5 MG capsule    GENERIC EQUIVALENT    60 capsule    Take 1 capsule (5 mg) by mouth every 12 hours *take with 2 mg capsules for total dose 7 mg every 12 hours    Liver transplant recipient (H)       * tacrolimus 1 MG capsule    GENERIC EQUIVALENT    60 capsule    Take 2 capsules (2 mg) by mouth every 12 hours *take with 5 mg capsules for total of 7 mg every 12 hours    Liver transplant recipient (H)       tadalafil 5 MG tablet    CIALIS    30 tablet    Take 1 tablet (5 mg) by mouth daily Never use with nitroglycerin, terazosin or doxazosin.    Drug-induced erectile dysfunction       * Notice:  This list has 2 medication(s) that are the same as other medications prescribed for you. Read the directions carefully, and ask your doctor or other care provider to review them with you.

## 2017-10-17 ENCOUNTER — TELEPHONE (OUTPATIENT)
Dept: TRANSPLANT | Facility: CLINIC | Age: 52
End: 2017-10-17

## 2017-10-17 DIAGNOSIS — Z94.4 LIVER TRANSPLANT RECIPIENT (H): ICD-10-CM

## 2017-10-17 LAB
CMV DNA SPEC NAA+PROBE-ACNC: NORMAL [IU]/ML
CMV DNA SPEC NAA+PROBE-LOG#: NORMAL {LOG_IU}/ML
SPECIMEN SOURCE: NORMAL

## 2017-10-17 NOTE — TELEPHONE ENCOUNTER
Please call Camacho about tacrolimus level= 3.6, this is too low.  Please ask him to change his dose to 7mg Q 12 hours.  (remind him that it isn't the number of pills, but the level in his blood that is important).   Please also let him know that I am waiting to hear back from  re: whether to proceed with the liver biopsy, as his liver tests improved.   Let him know I will call him about the final decision.

## 2017-10-17 NOTE — TELEPHONE ENCOUNTER
Instructed pt to increase tacrolimus to 7 mg Q 12 hours. Pt aware txp coordinator will call him with update on liver biopsy. Pt verbalized understanding and has no questions.

## 2017-10-18 ENCOUNTER — CARE COORDINATION (OUTPATIENT)
Dept: NEPHROLOGY | Facility: CLINIC | Age: 52
End: 2017-10-18

## 2017-10-18 ENCOUNTER — TELEPHONE (OUTPATIENT)
Dept: SURGERY | Facility: CLINIC | Age: 52
End: 2017-10-18

## 2017-10-18 ENCOUNTER — TELEPHONE (OUTPATIENT)
Dept: TRANSPLANT | Facility: CLINIC | Age: 52
End: 2017-10-18

## 2017-10-18 NOTE — TELEPHONE ENCOUNTER
"Called pt per task. 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 The \"big half is there and there is just a sliver of the other side, but everything in between is gone\".  Reports there was \"Solid waste coming out earlier\" which he \"fished out\" and then he \"found two halves of the plug\". Denies any nausea, vomiting, pain, bleeding, fevers, or chills. Per Caryn Busch NP-C area can be evaluated in clinic tomorrow, does not need to be seen urgently today. Explained to pt that he should contact the clinic should he experience any of the above symptoms. Pt states understanding of plan. Abril WATSON LPN    "

## 2017-10-18 NOTE — PROGRESS NOTES
Received orders from Dr. Mo for patient to start Veltassa 8.4 G daily for hyperkalemia.     Called patient to discuss. The main side effects are GI related (constipation, nausea, abdominal discomfort). Patient is concerned about this due to his ostomy and recent GI surgeries. He's actually having acute issues with his ostomy today and is being seen by colorectal surgery tomorrow. Will update Dr. Hernández.    Asked patient if he could get labs rechecked tomorrow to monitor his K, but he prefers to wait until Friday to get them done at [facility].     At this point, will monitor patient's potassium and other labs on Friday. Will review with Dr. Hernández at that time to determine if Veltassa is still recommended.    Quang Conteh, RN, BAN  Nephrology RN Care Coordinator

## 2017-10-18 NOTE — TELEPHONE ENCOUNTER
"Pt called to report \"my mucous plug disintegrated and my intestine is coming out of my abdomen. What should I do?\" Paged Dr Ronda Ogden and patient is to be seen in clinic by colo rectal NP or MD. Paged JUANITO Montano and Ashlee Bennett returned call. Pt to be seen tomorrow at 1215. Patient has further questions and paged SUJATHA Montano to call patient.   "

## 2017-10-19 ENCOUNTER — OFFICE VISIT (OUTPATIENT)
Dept: SURGERY | Facility: CLINIC | Age: 52
End: 2017-10-19

## 2017-10-19 ENCOUNTER — PRE VISIT (OUTPATIENT)
Dept: SURGERY | Facility: CLINIC | Age: 52
End: 2017-10-19

## 2017-10-19 VITALS
HEART RATE: 87 BPM | TEMPERATURE: 97.6 F | DIASTOLIC BLOOD PRESSURE: 80 MMHG | SYSTOLIC BLOOD PRESSURE: 140 MMHG | WEIGHT: 187.3 LBS | HEIGHT: 72 IN | OXYGEN SATURATION: 100 % | BODY MASS INDEX: 25.37 KG/M2

## 2017-10-19 DIAGNOSIS — Z09 FOLLOW-UP EXAMINATION FOLLOWING SURGERY: Primary | ICD-10-CM

## 2017-10-19 NOTE — TELEPHONE ENCOUNTER
"APPT INFORMATION    Date /Time: 10/19/17   12:15 PM   Reason for Appt: Fistula Issues   Ref Provider/Clinic: Gregory Patel   Patient Contact (Y/N) & Call Details: No referred   Action: Reviewed records     RECORDS CLINIC NAME  (\"None\" if no records ) RECEIVED RECS & IMG? Y/N   (may include other helpful notes)   Internal Clinics: Colon Records are in [facility]        External Clinics: None               "

## 2017-10-19 NOTE — MR AVS SNAPSHOT
After Visit Summary   10/19/2017    Camacho Bhagat    MRN: [id number]           Patient Information     Date Of Birth          1965        Visit Information        Provider Department      10/19/2017 12:15 PM Caryn Parikh APRN CNP [facility] Colon and Rectal Surgery        Today's Diagnoses     Follow-up examination following surgery    -  1       Follow-ups after your visit        Your next 10 appointments already scheduled     Dec 01, 2017  8:45 AM CST   (Arrive by 8:30 AM)   Return Liver Transplant with Toñito Camejo MD   [facility] Hepatology ([facility])    [address] [place] [address]  3rd [facility] [id number] [phone]            Dec 13, 2017 10:30 AM CST   (Arrive by 10:15 AM)   Return Visit with Sara Dinh MD   [facility] Colon and Rectal Surgery ([facility])    [address] [place] [address]  4th [facility] [address] [phone]              Who to contact     Please call your clinic at [phone] to:    Ask questions about your health    Make or cancel appointments    Discuss your medicines    Learn about your test results    Speak to your doctor   If you have compliments or concerns about an experience at your clinic, or if you wish to file a complaint, please contact [facility] Physicians Patient Relations at [phone] or email us at [email or web address]         Additional Information About Your Visit        MyChart Information     [employer] gives you secure access to your electronic health record. If you see a primary care provider, you can also send messages to your care team and make appointments. If you have questions, please call your primary care clinic.  If you do not have a primary care provider, please call [phone] and they will assist you.      [employer] is an electronic gateway that provides easy, online access to your medical records. With [employer], you  can request a clinic appointment, read your test results, renew a prescription or communicate with your care team.     To access your existing account, please contact your [facility] or call [phone] for assistance.        Care EveryWhere ID     This is your Care EveryWhere ID. This could be used by other organizations to access your [place] medical records  [id number]        Your Vitals Were     Pulse Temperature Height Pulse Oximetry BMI (Body Mass Index)       87 97.6  F (36.4  C) (Oral) 6' 100% 25.4 kg/m2        Blood Pressure from Last 3 Encounters:   10/19/17 140/80   10/16/17 141/68   10/16/17 143/84    Weight from Last 3 Encounters:   10/19/17 187 lb 4.8 oz   10/13/17 186 lb 6.4 oz   10/11/17 187 lb 12.8 oz              Today, you had the following     No orders found for display       Primary Care Provider    Shay Kirkpatrick MD       [place] GAR [address]        Equal Access to Services     [facility]: Hadii aad ku hadasho Soomaali, waaxda luqadaha, qaybta kaalmada adeegyada, waxay idiin hayaan gilberto kharabev ellisn . So [facility] [phone].    ATENCIÓN: Si habla español, tiene a zheng disposición servicios gratuitos de asistencia lingüística. Llame al [phone].    We comply with applicable federal civil rights laws and [place] laws. We do not discriminate on the basis of race, color, national origin, age, disability, sex, sexual orientation, or gender identity.            Thank you!     Thank you for choosing [facility] COLON AND RECTAL SURGERY  for your care. Our goal is always to provide you with excellent care. Hearing back from our patients is one way we can continue to improve our services. Please take a few minutes to complete the written survey that you may receive in the mail after your visit with us. Thank you!             Your Updated Medication List - Protect others around you: Learn how to safely use, store and throw away your medicines at www.disposemymeds.org.           This list is accurate as of: 10/19/17  1:48 PM.  Always use your most recent med list.                   Brand Name Dispense Instructions for use Diagnosis    acetaminophen 325 MG tablet    TYLENOL    100 tablet    Take 2 tablets (650 mg) by mouth every 4 hours as needed for mild pain    Osteoarthritis, unspecified osteoarthritis type, unspecified site       amLODIPine 5 MG tablet    NORVASC    60 tablet    Take 2 tablets (10 mg) by mouth daily    History of liver transplant (H), Elevated blood pressure reading, Long-term use of immunosuppressant medication       amoxicillin-clavulanate 500-125 MG per tablet    AUGMENTIN    30 tablet    Take 1 tablet by mouth 3 times daily    Status post liver transplantation (H), Long-term use of immunosuppressant medication       cyclobenzaprine 10 MG tablet    FLEXERIL    90 tablet    Take 1 tablet (10 mg) by mouth 3 times daily as needed for muscle spasms    Immunosuppression (H)       fludrocortisone 0.1 MG tablet    FLORINEF    60 tablet    Take 2 tablets (0.2 mg) by mouth daily For elevated potassium    History of liver transplant (H), Long-term use of immunosuppressant medication       furosemide 20 MG tablet    LASIX    30 tablet    Take 1 tablet (20 mg) by mouth daily    Hyperkalemia       GABAPENTIN PO      Take 300 mg by mouth 3 times daily        insulin glargine 100 UNIT/ML injection    LANTUS     Inject 50 Units Subcutaneous every morning        linagliptin 5 MG Tabs tablet    TRADJENTA     Take 5 mg by mouth daily        loperamide 2 MG capsule    IMODIUM    120 capsule    Take 2 capsules (4 mg) by mouth 2 times daily    S/P right hemicolectomy       OMEPRAZOLE PO      Take by mouth every morning        oxyCODONE 10 MG IR tablet    ROXICODONE    30 tablet    Take 0.5 tablets (5 mg) by mouth every 4 hours as needed for moderate to severe pain    History of liver transplant (H)       sodium bicarbonate 650 MG tablet     90 tablet    Take 1 tablet (650 mg) by  mouth 3 times daily    Liver transplant recipient (H)       sodium chloride (PF) 0.9% PF flush     600 mL    Flush 12 [place] drain with 10mls NS BID, flush 24 Fr drain with 20mls NS BID    Intra-abdominal abscess (H)       sodium polystyrene 0.25 GM/ML suspension    KAYEXALATE    240 mL    Take 240 mLs (60 g) by mouth once for 1 dose Avoid taking other oral medications 3 hours before or after this medication.        * tacrolimus 5 MG capsule    GENERIC EQUIVALENT    60 capsule    Take 1 capsule (5 mg) by mouth every 12 hours *take with 2 mg capsules for total dose 7 mg every 12 hours    Liver transplant recipient (H)       * tacrolimus 1 MG capsule    GENERIC EQUIVALENT    60 capsule    Take 2 capsules (2 mg) by mouth every 12 hours *take with 5 mg capsules for total of 7 mg every 12 hours    Liver transplant recipient (H)       tadalafil 5 MG tablet    CIALIS    30 tablet    Take 1 tablet (5 mg) by mouth daily Never use with nitroglycerin, terazosin or doxazosin.    Drug-induced erectile dysfunction       * Notice:  This list has 2 medication(s) that are the same as other medications prescribed for you. Read the directions carefully, and ask your doctor or other care provider to review them with you.

## 2017-10-19 NOTE — LETTER
"10/19/2017      RE: Camacho Bhagat  [address]  [facility] [address]       Colon and Rectal Surgery Clinic Note    Referring provider:  LAMIN Leblanc CNP  [address] [place] [address]     NAME: Camacho Bhagat  : 1965  BISI: 10/19/2017      Camacho Bhagat is a very pleasant 52 year old male status post liver transplantation here for follow-up of after exploratory laparotomy, lysis of adhesions, right hemicolectomy, end ileostomy, mucous fistula, and partial omentectomy, 2017 for retroperitoneal pericolonic air with sepsis after a recent colonoscopy with biopsies and associated colitis.     Interval history: The reports that his mucous fistula \"fell out\" yesterday and he is concerned about this. He denies any pain or bleeding. He denies any changes in bowel habits. He has been putting a moistened gauze dressing at this site daily and is having minimal drainage. Both abdominal drains have been removed by IR.     PLEASE SEE NOTE BELOW FOR PHYSICAL EXAMINATION, REVIEW OF SYSTEMS, AND OTHER HISTORY.    Assessment/Plan: 52 year old male status post liver transplantation here for follow-up of after exploratory laparotomy, lysis of adhesions, right hemicolectomy, end ileostomy, mucous fistula, and partial omentectomy, 2017 for retroperitoneal pericolonic air with sepsis after a recent colonoscopy with biopsies and associated colitis. He provides what appears to be gauze with dried mucous around it and a scab in a zip lock bag. Mucous fistula is pink and moist and appears healthy. He can put a gauze dressing over this to collect drainage. No special dressing needed. Reassure him that this looks normal. Stoma well everted with thick stool in the bag. Plan is for possible takedown 6 months from surgery.  Encouraged the patient to contact the clinic in the meantime with any questions or concerns.  Patient's questions were answered to his stated satisfaction and he is in agreement " with this plan.     -------------------------------------------------------------------------------------------------------------------    Medical history:  Past Medical History:   Diagnosis Date     Cancer (H)      Depressive disorder, not elsewhere classified      Esophageal reflux      Fibromyalgia 1/2009    dx with Dr Benitez( [facility])     History of thrombophlebitis      Osteoarthritis      Other acute embolism veins 11/01    Deep vein thrombophlebitis, filter placed     Other and unspecified hyperlipidemia      Other chronic nonalcoholic liver disease     Fatty liver      Other testicular hypofunction      Pneumonia, organism [phone] 10-01    Included ARDS, sepsis, and  acute renal failure; hospitalized     Rheumatoid arthritis(714.0)      Type II or unspecified type diabetes mellitus without mention of complication, not stated as uncontrolled 11/01    Managed by endocrinology     Unspecified essential hypertension 11/01    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     Unspecified sleep apnea     Uses BiPAP       Surgical history:  Past Surgical History:   Procedure Laterality Date     BENCH LIVER N/A 3/4/2017    Procedure: BENCH LIVER;  Surgeon: Jovan Tran MD;  Location: [facility] NONSPECIFIC PROCEDURE      tracheostomy     C NONSPECIFIC PROCEDURE      repair of deviated septum     C NONSPECIFIC PROCEDURE  2007    Rt knee arthroscopy     C TOTAL KNEE ARTHROPLASTY  2008    Right knee arthroscopy     CHOLECYSTECTOMY       COLONOSCOPY N/A 7/21/2017    Procedure: COMBINED COLONOSCOPY, SINGLE OR MULTIPLE BIOPSY/POLYPECTOMY BY BIOPSY;  Colonoscopy;  Surgeon: Izaiah Montes MD;  Location:  GI     ESOPHAGOSCOPY, GASTROSCOPY, DUODENOSCOPY (EGD), COMBINED N/A 8/4/2016    Procedure: COMBINED ESOPHAGOSCOPY, GASTROSCOPY, DUODENOSCOPY (EGD), BIOPSY SINGLE OR MULTIPLE;  Surgeon: Trent Pederson MD;  Location:  GI     ESOPHAGOSCOPY, GASTROSCOPY, DUODENOSCOPY (EGD), COMBINED N/A 8/27/2017     Procedure: COMBINED ESOPHAGOSCOPY, GASTROSCOPY, DUODENOSCOPY (EGD);;  Surgeon: Los Wynn MD;  Location: [facility] GI     LAPAROTOMY EXPLORATORY N/A 2017    Procedure: LAPAROTOMY EXPLORATORY;  Exploratory Laparotomy, washout;  Surgeon: Tip Zhang MD;  Location: [facility] OR     LAPAROTOMY EXPLORATORY N/A 2017    Procedure: LAPAROTOMY EXPLORATORY;  Exploratory Laparotomy, Washout with closure.;  Surgeon: Tip Zhang MD;  Location: [facility] OR     LAPAROTOMY EXPLORATORY N/A 2017    Procedure: LAPAROTOMY EXPLORATORY;  Exploratory Laparotomy, Right angelique-colectomy, end ileostomy, mucosal fistula, partial omentectomy;  Surgeon: Sara Dinh MD;  Location: [facility] OR     TRANSPLANT LIVER RECIPIENT  DONOR N/A 3/4/2017    Procedure: TRANSPLANT LIVER RECIPIENT  DONOR;  Surgeon: Jovan Tran MD;  Location: [facility] OR       Problem list:    Patient Active Problem List    Diagnosis Date Noted     Elevated serum creatinine 10/16/2017     Priority: Medium     Serum potassium elevated 10/16/2017     Priority: Medium     Low hemoglobin 2017     Priority: Medium     Retroperitoneal abscess (H) 2017     Priority: Medium     Peritonitis and retroperitoneal infections (H) 2017     Priority: Medium     Delirium, acute 2017     Priority: Medium     Pancytopenia (H) 2017     Priority: Medium     EJ (acute kidney injury) (H) 2017     Priority: Medium     Hyperkalemic renal tubular acidosis 2017     Priority: Medium     Inadequate oral intake 2017     Priority: Medium     Gangrene of finger (H) 2017     Priority: Medium     Ischemia of both lower extremities 2017     Priority: Medium     Distal ischemia due to shock/high pressor requirements       S/P liver transplant (H) 2017     Priority: Medium     Sepsis (H) 2017     Priority: Medium     Typhlitis 2017     Priority: Medium     Neutropenic colitis (H)  07/04/2017     Priority: Medium     Neck pain 05/03/2017     Priority: Medium     Back pain 05/03/2017     Priority: Medium     Immunosuppressed status (H) 03/10/2017     Priority: Medium     Liver transplant recipient (H) 03/04/2017     Priority: Medium     Hyperkalemia 02/14/2017     Priority: Medium     Uncontrolled type 2 diabetes mellitus with hyperglycemia, with long-term current use of insulin (H) 11/10/2016     Priority: Medium     Hepatocellular carcinoma (H) 01/27/2016     Priority: Medium     Osteoarthritis 01/18/2015     Priority: Medium     Rheumatoid arthritis (H) 01/18/2015     Priority: Medium     Pain medication agreement signed - Shay Sick 2/7/14 02/07/2014     Priority: Medium     Medication refill- do not delete  11/13/2013     Priority: Medium     Diabetes mellitus with neurological manifestation (H) 09/07/2012     Priority: Medium     Knee pain 09/16/2011     Priority: Medium     Primary localized osteoarthrosis, lower leg 09/16/2011     Priority: Medium     Fibromyalgia 08/15/2011     Priority: Medium     OA (osteoarthritis) 08/15/2011     Priority: Medium     Sicca syndrome (H) 08/15/2011     Priority: Medium     HYPERLIPIDEMIA LDL GOAL <100 10/31/2010     Priority: Medium     Testicular hypofunction      Priority: Medium     Problem list name updated by automated process. Provider to review       Sleep apnea      Priority: Medium     Uses BiPAP  Problem list name updated by automated process. Provider to review       Hyperlipidemia 10/10/2005     Priority: Medium     Problem list name updated by automated process. Provider to review       Depressive disorder, not elsewhere classified 05/29/2003     Priority: Medium     Esophageal reflux 10/08/2002     Priority: Medium     Carpal tunnel syndrome 07/08/2002     Priority: Medium     Essential hypertension 07/08/2002     Priority: Medium     Problem list name updated by automated process. Provider to review       Personal history of  thrombophlebitis 07/08/2002     Priority: Medium       Medications:  Current Outpatient Prescriptions   Medication Sig Dispense Refill     amoxicillin-clavulanate (AUGMENTIN) 500-125 MG per tablet Take 1 tablet by mouth 3 times daily 30 tablet 0     OMEPRAZOLE PO Take by mouth every morning       GABAPENTIN PO Take 300 mg by mouth 3 times daily       tacrolimus (GENERIC EQUIVALENT) 5 MG capsule Take 1 capsule (5 mg) by mouth every 12 hours *take with 2 mg capsules for total dose 7 mg every 12 hours 60 capsule 11     tacrolimus (GENERIC EQUIVALENT) 1 MG capsule Take 2 capsules (2 mg) by mouth every 12 hours *take with 5 mg capsules for total of 7 mg every 12 hours 60 capsule 11     insulin glargine (LANTUS) 100 UNIT/ML injection Inject 50 Units Subcutaneous every morning       linagliptin (TRADJENTA) 5 MG TABS tablet Take 5 mg by mouth daily       sodium chloride, PF, 0.9% PF flush Flush 12 french drain with 10mls NS BID, flush 24 Fr drain with 20mls NS  mL 3     furosemide (LASIX) 20 MG tablet Take 1 tablet (20 mg) by mouth daily 30 tablet 3     sodium polystyrene (KAYEXALATE) 0.25 GM/ML suspension Take 240 mLs (60 g) by mouth once for 1 dose Avoid taking other oral medications 3 hours before or after this medication. 240 mL 0     fludrocortisone (FLORINEF) 0.1 MG tablet Take 2 tablets (0.2 mg) by mouth daily For elevated potassium 60 tablet 3     oxyCODONE (ROXICODONE) 10 MG IR tablet Take 0.5 tablets (5 mg) by mouth every 4 hours as needed for moderate to severe pain 30 tablet 0     acetaminophen (TYLENOL) 325 MG tablet Take 2 tablets (650 mg) by mouth every 4 hours as needed for mild pain 100 tablet 3     loperamide (IMODIUM) 2 MG capsule Take 2 capsules (4 mg) by mouth 2 times daily 120 capsule 3     amLODIPine (NORVASC) 5 MG tablet Take 2 tablets (10 mg) by mouth daily 60 tablet 3     cyclobenzaprine (FLEXERIL) 10 MG tablet Take 1 tablet (10 mg) by mouth 3 times daily as needed for muscle spasms 90  tablet 0     sodium bicarbonate 650 MG tablet Take 1 tablet (650 mg) by mouth 3 times daily 90 tablet 5     tadalafil (CIALIS) 5 MG tablet Take 1 tablet (5 mg) by mouth daily Never use with nitroglycerin, terazosin or doxazosin. 30 tablet 11       Allergies:  Allergies   Allergen Reactions     Erythromycin GI Disturbance     Vioxx      Nausea, vomiting       Family history:  Family History   Problem Relation Age of Onset     DIABETES Father      Hypertension Father      Substance Abuse Father      Arthritis Mother      CANCER Mother      Thyroid     Thyroid Disease Mother      Other Cancer Mother      Colon Cancer No family hx of      Hyperlipidemia No family hx of      Coronary Artery Disease No family hx of      CEREBROVASCULAR DISEASE No family hx of      Breast Cancer No family hx of      Prostate Cancer No family hx of        Social history:  Social History     Social History     Marital status:      Spouse name: N/A     Number of children: 1     Years of education: N/A     Occupational History            Social History Main Topics     Smoking status: Former Smoker     Smokeless tobacco: Former User     Types: Chew     Quit date: 10/8/2015      Comment: Has used chewing tobacco since age 15 , chewed for 20yrs      Alcohol use No      Comment: last drink about a year ago (quit 2001)     Drug use: No     Sexual activity: Yes     Partners: Female     Other Topics Concern     Not on file     Social History Narrative    uSED TO BE      Back in school now             Nursing Notes:   Meg Stovall RN  10/19/2017 12:16 PM  Signed  Chief Complaint   Patient presents with     Clinic Care Coordination - Initial     rectal fistula w/ mucous plug       Vitals:    10/19/17 1214   BP: 140/80   BP Location: Right arm   Patient Position: Chair   Cuff Size: Adult Large   Pulse: 87   Temp: 97.6  F (36.4  C)   TempSrc: Oral   SpO2: 100%   Weight: 85 kg (187 lb 4.8 oz)   Height:  1.829 m (6')       Body mass index is 25.4 kg/(m^2).    Joselin Stovall RN      Physical Examination:  /80 (BP Location: Right arm, Patient Position: Chair, Cuff Size: Adult Large)  Pulse 87  Temp 97.6  F (36.4  C) (Oral)  Ht 6'  Wt 187 lb 4.8 oz  SpO2 100%  BMI 25.4 kg/m2  General: Pleasant, no acute distress  Abdomen: Soft, nontender, nondistended. No hepatosplenomegaly, no masses.  Well healed midline and end ileostomy. Mucous fistula in superior aspect of midline which is pink and moist with minimal drainage.    Total face to face time was 15 minutes, >50% counseling.  This is a postop visit.    LAMIN Queen, NP-C  Colon and Rectal Surgery  [facility]

## 2017-10-19 NOTE — PROGRESS NOTES
"Colon and Rectal Surgery Clinic Note    Referring provider:  Felicia Tolliver, LAMIN CNP  [address] [place] [address]     NAME: Camacho Bhagat  : 1965  BISI: 10/19/2017      Camacho Bhagat is a very pleasant 52 year old male status post liver transplantation here for follow-up of after exploratory laparotomy, lysis of adhesions, right hemicolectomy, end ileostomy, mucous fistula, and partial omentectomy, 2017 for retroperitoneal pericolonic air with sepsis after a recent colonoscopy with biopsies and associated colitis.     Interval history: The reports that his mucous fistula \"fell out\" yesterday and he is concerned about this. He denies any pain or bleeding. He denies any changes in bowel habits. He has been putting a moistened gauze dressing at this site daily and is having minimal drainage. Both abdominal drains have been removed by IR.     PLEASE SEE NOTE BELOW FOR PHYSICAL EXAMINATION, REVIEW OF SYSTEMS, AND OTHER HISTORY.    Assessment/Plan: 52 year old male status post liver transplantation here for follow-up of after exploratory laparotomy, lysis of adhesions, right hemicolectomy, end ileostomy, mucous fistula, and partial omentectomy, 2017 for retroperitoneal pericolonic air with sepsis after a recent colonoscopy with biopsies and associated colitis. He provides what appears to be gauze with dried mucous around it and a scab in a zip lock bag. Mucous fistula is pink and moist and appears healthy. He can put a gauze dressing over this to collect drainage. No special dressing needed. Reassure him that this looks normal. Stoma well everted with thick stool in the bag. Plan is for possible takedown 6 months from surgery.  Encouraged the patient to contact the clinic in the meantime with any questions or concerns.  Patient's questions were answered to his stated satisfaction and he is in agreement with this plan.   "   -------------------------------------------------------------------------------------------------------------------    Medical history:  Past Medical History:   Diagnosis Date     Cancer (H)      Depressive disorder, not elsewhere classified      Esophageal reflux      Fibromyalgia 1/2009    dx with Dr Benitez( [facility])     History of thrombophlebitis      Osteoarthritis      Other acute embolism veins 11/01    Deep vein thrombophlebitis, filter placed     Other and unspecified hyperlipidemia      Other chronic nonalcoholic liver disease     Fatty liver      Other testicular hypofunction      Pneumonia, organism [phone]    Included ARDS, sepsis, and  acute renal failure; hospitalized     Rheumatoid arthritis(714.0)      Type II or unspecified type diabetes mellitus without mention of complication, not stated as uncontrolled 11/01    Managed by endocrinology     Unspecified essential hypertension 11/01    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     Unspecified sleep apnea     Uses BiPAP       Surgical history:  Past Surgical History:   Procedure Laterality Date     BENCH LIVER N/A 3/4/2017    Procedure: BENCH LIVER;  Surgeon: Jovan Tran MD;  Location: [facility] NONSPECIFIC PROCEDURE      tracheostomy     C NONSPECIFIC PROCEDURE      repair of deviated septum     C NONSPECIFIC PROCEDURE  2007    Rt knee arthroscopy     C TOTAL KNEE ARTHROPLASTY  2008    Right knee arthroscopy     CHOLECYSTECTOMY       COLONOSCOPY N/A 7/21/2017    Procedure: COMBINED COLONOSCOPY, SINGLE OR MULTIPLE BIOPSY/POLYPECTOMY BY BIOPSY;  Colonoscopy;  Surgeon: Izaiah Montes MD;  Location:  GI     ESOPHAGOSCOPY, GASTROSCOPY, DUODENOSCOPY (EGD), COMBINED N/A 8/4/2016    Procedure: COMBINED ESOPHAGOSCOPY, GASTROSCOPY, DUODENOSCOPY (EGD), BIOPSY SINGLE OR MULTIPLE;  Surgeon: Trent Pederson MD;  Location:  GI     ESOPHAGOSCOPY, GASTROSCOPY, DUODENOSCOPY (EGD), COMBINED N/A 8/27/2017    Procedure:  COMBINED ESOPHAGOSCOPY, GASTROSCOPY, DUODENOSCOPY (EGD);;  Surgeon: Los Wynn MD;  Location: [facility] GI     LAPAROTOMY EXPLORATORY N/A 2017    Procedure: LAPAROTOMY EXPLORATORY;  Exploratory Laparotomy, washout;  Surgeon: Tip Zhang MD;  Location: [facility] OR     LAPAROTOMY EXPLORATORY N/A 2017    Procedure: LAPAROTOMY EXPLORATORY;  Exploratory Laparotomy, Washout with closure.;  Surgeon: Tip Zhang MD;  Location: [facility] OR     LAPAROTOMY EXPLORATORY N/A 2017    Procedure: LAPAROTOMY EXPLORATORY;  Exploratory Laparotomy, Right angelique-colectomy, end ileostomy, mucosal fistula, partial omentectomy;  Surgeon: Sara Dinh MD;  Location: [facility] OR     TRANSPLANT LIVER RECIPIENT  DONOR N/A 3/4/2017    Procedure: TRANSPLANT LIVER RECIPIENT  DONOR;  Surgeon: Jovan Tran MD;  Location: [facility] OR       Problem list:    Patient Active Problem List    Diagnosis Date Noted     Elevated serum creatinine 10/16/2017     Priority: Medium     Serum potassium elevated 10/16/2017     Priority: Medium     Low hemoglobin 2017     Priority: Medium     Retroperitoneal abscess (H) 2017     Priority: Medium     Peritonitis and retroperitoneal infections (H) 2017     Priority: Medium     Delirium, acute 2017     Priority: Medium     Pancytopenia (H) 2017     Priority: Medium     EJ (acute kidney injury) (H) 2017     Priority: Medium     Hyperkalemic renal tubular acidosis 2017     Priority: Medium     Inadequate oral intake 2017     Priority: Medium     Gangrene of finger (H) 2017     Priority: Medium     Ischemia of both lower extremities 2017     Priority: Medium     Distal ischemia due to shock/high pressor requirements       S/P liver transplant (H) 2017     Priority: Medium     Sepsis (H) 2017     Priority: Medium     Typhlitis 2017     Priority: Medium     Neutropenic colitis (H) 2017      Priority: Medium     Neck pain 05/03/2017     Priority: Medium     Back pain 05/03/2017     Priority: Medium     Immunosuppressed status (H) 03/10/2017     Priority: Medium     Liver transplant recipient (H) 03/04/2017     Priority: Medium     Hyperkalemia 02/14/2017     Priority: Medium     Uncontrolled type 2 diabetes mellitus with hyperglycemia, with long-term current use of insulin (H) 11/10/2016     Priority: Medium     Hepatocellular carcinoma (H) 01/27/2016     Priority: Medium     Osteoarthritis 01/18/2015     Priority: Medium     Rheumatoid arthritis (H) 01/18/2015     Priority: Medium     Pain medication agreement signed - Shay Sick 2/7/14 02/07/2014     Priority: Medium     Medication refill- do not delete  11/13/2013     Priority: Medium     Diabetes mellitus with neurological manifestation (H) 09/07/2012     Priority: Medium     Knee pain 09/16/2011     Priority: Medium     Primary localized osteoarthrosis, lower leg 09/16/2011     Priority: Medium     Fibromyalgia 08/15/2011     Priority: Medium     OA (osteoarthritis) 08/15/2011     Priority: Medium     Sicca syndrome (H) 08/15/2011     Priority: Medium     HYPERLIPIDEMIA LDL GOAL <100 10/31/2010     Priority: Medium     Testicular hypofunction      Priority: Medium     Problem list name updated by automated process. Provider to review       Sleep apnea      Priority: Medium     Uses BiPAP  Problem list name updated by automated process. Provider to review       Hyperlipidemia 10/10/2005     Priority: Medium     Problem list name updated by automated process. Provider to review       Depressive disorder, not elsewhere classified 05/29/2003     Priority: Medium     Esophageal reflux 10/08/2002     Priority: Medium     Carpal tunnel syndrome 07/08/2002     Priority: Medium     Essential hypertension 07/08/2002     Priority: Medium     Problem list name updated by automated process. Provider to review       Personal history of thrombophlebitis  07/08/2002     Priority: Medium       Medications:  Current Outpatient Prescriptions   Medication Sig Dispense Refill     amoxicillin-clavulanate (AUGMENTIN) 500-125 MG per tablet Take 1 tablet by mouth 3 times daily 30 tablet 0     OMEPRAZOLE PO Take by mouth every morning       GABAPENTIN PO Take 300 mg by mouth 3 times daily       tacrolimus (GENERIC EQUIVALENT) 5 MG capsule Take 1 capsule (5 mg) by mouth every 12 hours *take with 2 mg capsules for total dose 7 mg every 12 hours 60 capsule 11     tacrolimus (GENERIC EQUIVALENT) 1 MG capsule Take 2 capsules (2 mg) by mouth every 12 hours *take with 5 mg capsules for total of 7 mg every 12 hours 60 capsule 11     insulin glargine (LANTUS) 100 UNIT/ML injection Inject 50 Units Subcutaneous every morning       linagliptin (TRADJENTA) 5 MG TABS tablet Take 5 mg by mouth daily       sodium chloride, PF, 0.9% PF flush Flush 12 french drain with 10mls NS BID, flush 24 Fr drain with 20mls NS  mL 3     furosemide (LASIX) 20 MG tablet Take 1 tablet (20 mg) by mouth daily 30 tablet 3     sodium polystyrene (KAYEXALATE) 0.25 GM/ML suspension Take 240 mLs (60 g) by mouth once for 1 dose Avoid taking other oral medications 3 hours before or after this medication. 240 mL 0     fludrocortisone (FLORINEF) 0.1 MG tablet Take 2 tablets (0.2 mg) by mouth daily For elevated potassium 60 tablet 3     oxyCODONE (ROXICODONE) 10 MG IR tablet Take 0.5 tablets (5 mg) by mouth every 4 hours as needed for moderate to severe pain 30 tablet 0     acetaminophen (TYLENOL) 325 MG tablet Take 2 tablets (650 mg) by mouth every 4 hours as needed for mild pain 100 tablet 3     loperamide (IMODIUM) 2 MG capsule Take 2 capsules (4 mg) by mouth 2 times daily 120 capsule 3     amLODIPine (NORVASC) 5 MG tablet Take 2 tablets (10 mg) by mouth daily 60 tablet 3     cyclobenzaprine (FLEXERIL) 10 MG tablet Take 1 tablet (10 mg) by mouth 3 times daily as needed for muscle spasms 90 tablet 0     sodium  bicarbonate 650 MG tablet Take 1 tablet (650 mg) by mouth 3 times daily 90 tablet 5     tadalafil (CIALIS) 5 MG tablet Take 1 tablet (5 mg) by mouth daily Never use with nitroglycerin, terazosin or doxazosin. 30 tablet 11       Allergies:  Allergies   Allergen Reactions     Erythromycin GI Disturbance     Vioxx      Nausea, vomiting       Family history:  Family History   Problem Relation Age of Onset     DIABETES Father      Hypertension Father      Substance Abuse Father      Arthritis Mother      CANCER Mother      Thyroid     Thyroid Disease Mother      Other Cancer Mother      Colon Cancer No family hx of      Hyperlipidemia No family hx of      Coronary Artery Disease No family hx of      CEREBROVASCULAR DISEASE No family hx of      Breast Cancer No family hx of      Prostate Cancer No family hx of        Social history:  Social History     Social History     Marital status:      Spouse name: N/A     Number of children: 1     Years of education: N/A     Occupational History            Social History Main Topics     Smoking status: Former Smoker     Smokeless tobacco: Former User     Types: Chew     Quit date: 10/8/2015      Comment: Has used chewing tobacco since age 15 , chewed for 20yrs      Alcohol use No      Comment: last drink about a year ago (quit 2001)     Drug use: No     Sexual activity: Yes     Partners: Female     Other Topics Concern     Not on file     Social History Narrative    uSED TO BE      Back in school now             Nursing Notes:   Meg Stovall RN  10/19/2017 12:16 PM  Signed  Chief Complaint   Patient presents with     Clinic Care Coordination - Initial     rectal fistula w/ mucous plug       Vitals:    10/19/17 1214   BP: 140/80   BP Location: Right arm   Patient Position: Chair   Cuff Size: Adult Large   Pulse: 87   Temp: 97.6  F (36.4  C)   TempSrc: Oral   SpO2: 100%   Weight: 85 kg (187 lb 4.8 oz)   Height: 1.829 m (6')        Body mass index is 25.4 kg/(m^2).    Joselin Stovall RN                           Physical Examination:  /80 (BP Location: Right arm, Patient Position: Chair, Cuff Size: Adult Large)  Pulse 87  Temp 97.6  F (36.4  C) (Oral)  Ht 6'  Wt 187 lb 4.8 oz  SpO2 100%  BMI 25.4 kg/m2  General: Pleasant, no acute distress  Abdomen: Soft, nontender, nondistended. No hepatosplenomegaly, no masses.  Well healed midline and end ileostomy. Mucous fistula in superior aspect of midline which is pink and moist with minimal drainage.    Total face to face time was 15 minutes, >50% counseling.  This is a postop visit.    LAMIN Queen, NP-C  Colon and Rectal Surgery  [facility]

## 2017-10-19 NOTE — NURSING NOTE
Chief Complaint   Patient presents with     Clinic Care Coordination - Initial     rectal fistula w/ mucous plug       Vitals:    10/19/17 1214   BP: 140/80   BP Location: Right arm   Patient Position: Chair   Cuff Size: Adult Large   Pulse: 87   Temp: 97.6  F (36.4  C)   TempSrc: Oral   SpO2: 100%   Weight: 85 kg (187 lb 4.8 oz)   Height: 1.829 m (6')       Body mass index is 25.4 kg/(m^2).    Joselin Stovall RN

## 2017-10-20 ENCOUNTER — CARE COORDINATION (OUTPATIENT)
Dept: NEPHROLOGY | Facility: CLINIC | Age: 52
End: 2017-10-20

## 2017-10-20 ENCOUNTER — TELEPHONE (OUTPATIENT)
Dept: SURGERY | Facility: CLINIC | Age: 52
End: 2017-10-20

## 2017-10-20 ENCOUNTER — HOSPITAL ENCOUNTER (OUTPATIENT)
Dept: LAB | Facility: CLINIC | Age: 52
Discharge: HOME | End: 2017-10-20
Attending: INTERNAL MEDICINE | Admitting: INTERNAL MEDICINE
Payer: COMMERCIAL

## 2017-10-20 ENCOUNTER — HOSPITAL ENCOUNTER (EMERGENCY)
Facility: CLINIC | Age: 52
Discharge: LEFT BEFORE DISCHARGE | End: 2017-10-20
Attending: EMERGENCY MEDICINE | Admitting: EMERGENCY MEDICINE
Payer: COMMERCIAL

## 2017-10-20 VITALS
TEMPERATURE: 98.6 F | RESPIRATION RATE: 12 BRPM | HEART RATE: 96 BPM | SYSTOLIC BLOOD PRESSURE: 156 MMHG | DIASTOLIC BLOOD PRESSURE: 90 MMHG | OXYGEN SATURATION: 100 %

## 2017-10-20 DIAGNOSIS — D64.9 ANEMIA, UNSPECIFIED TYPE: ICD-10-CM

## 2017-10-20 DIAGNOSIS — E87.20 ACIDOSIS: ICD-10-CM

## 2017-10-20 DIAGNOSIS — E87.5 HYPERPOTASSEMIA: ICD-10-CM

## 2017-10-20 DIAGNOSIS — Z94.4 HISTORY OF LIVER TRANSPLANT (H): ICD-10-CM

## 2017-10-20 DIAGNOSIS — D72.819 DECREASED WHITE BLOOD CELL COUNT: ICD-10-CM

## 2017-10-20 DIAGNOSIS — K52.9 COLITIS: Primary | ICD-10-CM

## 2017-10-20 DIAGNOSIS — Z79.60 LONG-TERM USE OF IMMUNOSUPPRESSANT MEDICATION: ICD-10-CM

## 2017-10-20 LAB
ABO + RH BLD: NORMAL
ABO + RH BLD: NORMAL
ALBUMIN SERPL-MCNC: 2.8 G/DL (ref 3.4–5)
ALP SERPL-CCNC: 283 U/L (ref 40–150)
ALT SERPL W P-5'-P-CCNC: 32 U/L (ref 0–70)
ANION GAP SERPL CALCULATED.3IONS-SCNC: 6 MMOL/L (ref 3–14)
ANION GAP SERPL CALCULATED.3IONS-SCNC: 7 MMOL/L (ref 3–14)
ANION GAP SERPL CALCULATED.3IONS-SCNC: 7 MMOL/L (ref 3–14)
AST SERPL W P-5'-P-CCNC: 37 U/L (ref 0–45)
BASE DEFICIT BLDV-SCNC: 10.2 MMOL/L
BASE DEFICIT BLDV-SCNC: 10.9 MMOL/L
BASOPHILS # BLD AUTO: 0 10E9/L (ref 0–0.2)
BASOPHILS # BLD AUTO: 0 10E9/L (ref 0–0.2)
BASOPHILS NFR BLD AUTO: 0.2 %
BASOPHILS NFR BLD AUTO: 0.2 %
BILIRUB DIRECT SERPL-MCNC: 0.2 MG/DL (ref 0–0.2)
BILIRUB SERPL-MCNC: 0.4 MG/DL (ref 0.2–1.3)
BLD GP AB SCN SERPL QL: NORMAL
BLOOD BANK CMNT PATIENT-IMP: NORMAL
BUN SERPL-MCNC: 43 MG/DL (ref 7–30)
BUN SERPL-MCNC: 46 MG/DL (ref 7–30)
BUN SERPL-MCNC: 47 MG/DL (ref 7–30)
CALCIUM SERPL-MCNC: 8.1 MG/DL (ref 8.5–10.1)
CALCIUM SERPL-MCNC: 8.7 MG/DL (ref 8.5–10.1)
CALCIUM SERPL-MCNC: 8.7 MG/DL (ref 8.5–10.1)
CHLORIDE SERPL-SCNC: 108 MMOL/L (ref 94–109)
CHLORIDE SERPL-SCNC: 110 MMOL/L (ref 94–109)
CHLORIDE SERPL-SCNC: 110 MMOL/L (ref 94–109)
CO2 SERPL-SCNC: 18 MMOL/L (ref 20–32)
CO2 SERPL-SCNC: 18 MMOL/L (ref 20–32)
CO2 SERPL-SCNC: 19 MMOL/L (ref 20–32)
CREAT SERPL-MCNC: 1.61 MG/DL (ref 0.66–1.25)
CREAT SERPL-MCNC: 1.76 MG/DL (ref 0.66–1.25)
CREAT SERPL-MCNC: 1.76 MG/DL (ref 0.66–1.25)
DIFFERENTIAL METHOD BLD: ABNORMAL
DIFFERENTIAL METHOD BLD: NORMAL
EOSINOPHIL # BLD AUTO: 0.1 10E9/L (ref 0–0.7)
EOSINOPHIL # BLD AUTO: 0.1 10E9/L (ref 0–0.7)
EOSINOPHIL NFR BLD AUTO: 1.2 %
EOSINOPHIL NFR BLD AUTO: 1.8 %
ERYTHROCYTE [DISTWIDTH] IN BLOOD BY AUTOMATED COUNT: 14.3 % (ref 10–15)
ERYTHROCYTE [DISTWIDTH] IN BLOOD BY AUTOMATED COUNT: 14.6 % (ref 10–15)
GFR SERPL CREATININE-BSD FRML MDRD: 41 ML/MIN/1.7M2
GFR SERPL CREATININE-BSD FRML MDRD: 41 ML/MIN/1.7M2
GFR SERPL CREATININE-BSD FRML MDRD: 45 ML/MIN/1.7M2
GLUCOSE SERPL-MCNC: 138 MG/DL (ref 70–99)
GLUCOSE SERPL-MCNC: 221 MG/DL (ref 70–99)
GLUCOSE SERPL-MCNC: 72 MG/DL (ref 70–99)
HCO3 BLDV-SCNC: 17 MMOL/L (ref 21–28)
HCO3 BLDV-SCNC: 17 MMOL/L (ref 21–28)
HCT VFR BLD AUTO: 19.5 % (ref 40–53)
HCT VFR BLD AUTO: 21.7 % (ref 40–53)
HGB BLD-MCNC: 6.4 G/DL (ref 13.3–17.7)
HGB BLD-MCNC: 7 G/DL (ref 13.3–17.7)
IMM GRANULOCYTES # BLD: 0.2 10E9/L (ref 0–0.4)
IMM GRANULOCYTES # BLD: 0.2 10E9/L (ref 0–0.4)
IMM GRANULOCYTES NFR BLD: 3 %
IMM GRANULOCYTES NFR BLD: 4.4 %
LDH SERPL L TO P-CCNC: 379 U/L (ref 85–227)
LYMPHOCYTES # BLD AUTO: 1.3 10E9/L (ref 0.8–5.3)
LYMPHOCYTES # BLD AUTO: 1.5 10E9/L (ref 0.8–5.3)
LYMPHOCYTES NFR BLD AUTO: 26.3 %
LYMPHOCYTES NFR BLD AUTO: 27.1 %
MAGNESIUM SERPL-MCNC: 1.6 MG/DL (ref 1.6–2.3)
MAGNESIUM SERPL-MCNC: 1.6 MG/DL (ref 1.6–2.3)
MCH RBC QN AUTO: 30.8 PG (ref 26.5–33)
MCH RBC QN AUTO: 31.1 PG (ref 26.5–33)
MCHC RBC AUTO-ENTMCNC: 32.3 G/DL (ref 31.5–36.5)
MCHC RBC AUTO-ENTMCNC: 32.8 G/DL (ref 31.5–36.5)
MCV RBC AUTO: 94 FL (ref 78–100)
MCV RBC AUTO: 96 FL (ref 78–100)
MONOCYTES # BLD AUTO: 0.4 10E9/L (ref 0–1.3)
MONOCYTES # BLD AUTO: 0.4 10E9/L (ref 0–1.3)
MONOCYTES NFR BLD AUTO: 6.8 %
MONOCYTES NFR BLD AUTO: 7.9 %
NEUTROPHILS # BLD AUTO: 3.1 10E9/L (ref 1.6–8.3)
NEUTROPHILS # BLD AUTO: 3.2 10E9/L (ref 1.6–8.3)
NEUTROPHILS NFR BLD AUTO: 59.7 %
NEUTROPHILS NFR BLD AUTO: 61.4 %
NRBC # BLD AUTO: 0 10*3/UL
NRBC # BLD AUTO: 0 10*3/UL
NRBC BLD AUTO-RTO: 0 /100
NRBC BLD AUTO-RTO: 0 /100
O2/TOTAL GAS SETTING VFR VENT: ABNORMAL %
O2/TOTAL GAS SETTING VFR VENT: ABNORMAL %
OXYHGB MFR BLDV: 28 %
OXYHGB MFR BLDV: 43 %
PCO2 BLDV: 43 MM HG (ref 40–50)
PCO2 BLDV: 45 MM HG (ref 40–50)
PH BLDV: 7.19 PH (ref 7.32–7.43)
PH BLDV: 7.19 PH (ref 7.32–7.43)
PHOSPHATE SERPL-MCNC: 4 MG/DL (ref 2.5–4.5)
PLATELET # BLD AUTO: 92 10E9/L (ref 150–450)
PLATELET # BLD AUTO: 94 10E9/L (ref 150–450)
PO2 BLDV: 21 MM HG (ref 25–47)
PO2 BLDV: 23 MM HG (ref 25–47)
POTASSIUM SERPL-SCNC: 5.7 MMOL/L (ref 3.4–5.3)
POTASSIUM SERPL-SCNC: 6.6 MMOL/L (ref 3.4–5.3)
POTASSIUM SERPL-SCNC: 6.9 MMOL/L (ref 3.4–5.3)
PROT SERPL-MCNC: 8 G/DL (ref 6.8–8.8)
RBC # BLD AUTO: 2.08 10E12/L (ref 4.4–5.9)
RBC # BLD AUTO: 2.25 10E12/L (ref 4.4–5.9)
RETICS # AUTO: 67.4 10E9/L (ref 25–95)
RETICS/RBC NFR AUTO: 3.1 % (ref 0.5–2)
SODIUM SERPL-SCNC: 133 MMOL/L (ref 133–144)
SODIUM SERPL-SCNC: 135 MMOL/L (ref 133–144)
SODIUM SERPL-SCNC: 135 MMOL/L (ref 133–144)
SPECIMEN EXP DATE BLD: NORMAL
TACROLIMUS BLD-MCNC: <3 UG/L (ref 5–15)
TME LAST DOSE: ABNORMAL H
WBC # BLD AUTO: 5.1 10E9/L (ref 4–11)
WBC # BLD AUTO: 5.4 10E9/L (ref 4–11)

## 2017-10-20 PROCEDURE — 83615 LACTATE (LD) (LDH) ENZYME: CPT | Performed by: EMERGENCY MEDICINE

## 2017-10-20 PROCEDURE — 83735 ASSAY OF MAGNESIUM: CPT | Performed by: EMERGENCY MEDICINE

## 2017-10-20 PROCEDURE — 25000128 H RX IP 250 OP 636: Performed by: EMERGENCY MEDICINE

## 2017-10-20 PROCEDURE — 80048 BASIC METABOLIC PNL TOTAL CA: CPT | Mod: 91 | Performed by: EMERGENCY MEDICINE

## 2017-10-20 PROCEDURE — 82805 BLOOD GASES W/O2 SATURATION: CPT | Performed by: EMERGENCY MEDICINE

## 2017-10-20 PROCEDURE — 85045 AUTOMATED RETICULOCYTE COUNT: CPT | Performed by: EMERGENCY MEDICINE

## 2017-10-20 PROCEDURE — 86901 BLOOD TYPING SEROLOGIC RH(D): CPT | Performed by: EMERGENCY MEDICINE

## 2017-10-20 PROCEDURE — 85025 COMPLETE CBC W/AUTO DIFF WBC: CPT | Performed by: EMERGENCY MEDICINE

## 2017-10-20 PROCEDURE — 96360 HYDRATION IV INFUSION INIT: CPT

## 2017-10-20 PROCEDURE — 86900 BLOOD TYPING SEROLOGIC ABO: CPT | Performed by: EMERGENCY MEDICINE

## 2017-10-20 PROCEDURE — 93005 ELECTROCARDIOGRAM TRACING: CPT

## 2017-10-20 PROCEDURE — 85060 BLOOD SMEAR INTERPRETATION: CPT | Performed by: EMERGENCY MEDICINE

## 2017-10-20 PROCEDURE — 99284 EMERGENCY DEPT VISIT MOD MDM: CPT | Mod: 25

## 2017-10-20 PROCEDURE — 40000847 ZZHCL STATISTIC MORPHOLOGY W/INTERP HISTOLOGY TC 85060: Performed by: EMERGENCY MEDICINE

## 2017-10-20 PROCEDURE — 86850 RBC ANTIBODY SCREEN: CPT | Performed by: EMERGENCY MEDICINE

## 2017-10-20 PROCEDURE — 96361 HYDRATE IV INFUSION ADD-ON: CPT

## 2017-10-20 RX ORDER — SODIUM CHLORIDE 9 MG/ML
1000 INJECTION, SOLUTION INTRAVENOUS CONTINUOUS
Status: DISCONTINUED | OUTPATIENT
Start: 2017-10-20 | End: 2017-10-20 | Stop reason: HOSPADM

## 2017-10-20 RX ADMIN — SODIUM CHLORIDE 1000 ML: 9 INJECTION, SOLUTION INTRAVENOUS at 17:48

## 2017-10-20 RX ADMIN — SODIUM CHLORIDE 1000 ML: 9 INJECTION, SOLUTION INTRAVENOUS at 16:22

## 2017-10-20 RX ADMIN — SODIUM CHLORIDE 1000 ML: 9 INJECTION, SOLUTION INTRAVENOUS at 18:29

## 2017-10-20 NOTE — ED NOTES
Pt reports he had a routine blood draw this morning and his potassium was 6.5. Pt states his potassium is often high and he goes to the infusion center when that happens. Pt states he has a new doctor who told him to come to the ED this time.

## 2017-10-20 NOTE — PROGRESS NOTES
Reviewed patient's labs from today and potassium is 6.9. Called and spoke with patient's liver transplant coordinator who was not paged about the critical result, but agrees that patient should go to the ER. Hgb is also low at 7.   Called patient and reviewed labs and recommended that he go to the emergency room for further evaluation and treatment. Patient resistant to going to the ER; educated him on risk of high potassium. Pt eventually agreed to going to [facility]; does not want to come to [facility].     I called [facility] to notify them.     Quang Conteh, RN   Nephrology RN Care Coordinator

## 2017-10-20 NOTE — ED AVS SNAPSHOT
[facility] Emergency Department    [address]    [facility] [address]    Phone:  [phone]    Fax:  [phone]                                       Camacho Bhagat   MRN: [id number]    Department:  [facility] Emergency Department   Date of Visit:  10/20/2017           After Visit Summary Signature Page     I have received my discharge instructions, and my questions have been answered. I have discussed any challenges I see with this plan with the nurse or doctor.    ..........................................................................................................................................  Patient/Patient Representative Signature      ..........................................................................................................................................  Patient Representative Print Name and Relationship to Patient    ..................................................               ................................................  Date                                            Time    ..........................................................................................................................................  Reviewed by Signature/Title    ...................................................              ..............................................  Date                                                            Time

## 2017-10-20 NOTE — ED PROVIDER NOTES
History     Chief Complaint:  Abnormal Labs      HPI   Camacho Bhagat is a 52 year old male with a history of high potasium who presents to the emergency department for evaluation of abnormal labs. The patient says that he had a routine blood draw this morning and his potassium was elevated at 6.5. He says that this is normal for him and that to help control his potassium he usually drinks a lot of water or goes to an infusion center. He is having normal urine output and also is asymptomatic. He was sent here by his kidney doctor.     Allergies:  Erythromycin    Vioxx    Medications:    amoxicillin-clavulanate (AUGMENTIN) 500-125 MG per tablet  OMEPRAZOLE PO  GABAPENTIN PO  tacrolimus (GENERIC EQUIVALENT) 5 MG capsule  tacrolimus (GENERIC EQUIVALENT) 1 MG capsule  insulin glargine (LANTUS) 100 UNIT/ML injection  linagliptin (TRADJENTA) 5 MG TABS tablet  sodium chloride, PF, 0.9% PF flush  furosemide (LASIX) 20 MG tablet  sodium polystyrene (KAYEXALATE) 0.25 GM/ML suspension  fludrocortisone (FLORINEF) 0.1 MG tablet  oxycodone (ROXICODONE) 10 MG IR tablet  acetaminophen (TYLENOL) 325 MG tablet  loperamide (IMODIUM) 2 MG capsule  amlodipine (NORVASC) 5 MG tablet  cyclobenzaprine (FLEXERIL) 10 MG tablet  sodium bicarbonate 650 MG tablet  tadalafil (CIALIS) 5 MG tablet    Past Medical History:    Cancer    Depressive disorder  Esophageal reflux   Fibromyalgia   History of thrombophlebitis   Osteoarthritis   Other acute embolism veins  Other and unspecified hyperlipidemia   Other chronic nonalcoholic liver disease   Other testicular hypofunction   Pneumonia, organism unspecified  Rheumatoid arthritis  Type II or unspecified type diabetes mellitus without mention of complication, not stated as uncontrolled   Unspecified essential hypertension   Unspecified sleep apnea     Past Surgical History:    Bench liver  Tracheostomy  Repair of deviated septum  Right knee arthroscopy  cholecystectomy  EGD  Exploratory  laparotomy  Liver transplant     Family History:    Father- diabetes, hypertension, substance abuse  Mother- arthritis, thyroid cancer, thyroid disease      Social History:  Marital Status:   [2]  Social History   Substance Use Topics     Smoking status: Former Smoker     Smokeless tobacco: Former User     Types: Chew     Quit date: 10/8/2015      Comment: Has used chewing tobacco since age 15 , chewed for 20yrs      Alcohol use No      Comment: last drink about a year ago (quit 2001)        Review of Systems   All other systems reviewed and are negative.        Physical Exam   First Vitals:  BP: (!) 158/97  Pulse: 96  Temp: 98.6  F (37  C)  Resp: 18  SpO2: 100 %      Physical Exam   Constitutional: He is oriented to person, place, and time.   Chronically ill appearing, pale appearing.   HENT:   Head: Normocephalic and atraumatic.   Right Ear: External ear normal.   Mouth/Throat: Oropharynx is clear and moist.   Eyes: Conjunctivae and EOM are normal. Pupils are equal, round, and reactive to light.   Neck: Normal range of motion. Neck supple. No JVD present.   Cardiovascular: Normal rate, regular rhythm and normal heart sounds.    Pulmonary/Chest: Effort normal and breath sounds normal.   Abdominal: Soft. Bowel sounds are normal. He exhibits no distension. There is no tenderness. There is no rebound.   Musculoskeletal: Normal range of motion.   Lymphadenopathy:     He has no cervical adenopathy.   Neurological: He is alert and oriented to person, place, and time. He displays normal reflexes. No cranial nerve deficit. He exhibits normal muscle tone. Coordination normal.   Skin: Skin is warm and dry.   Psychiatric: He has a normal mood and affect. His behavior is normal. Judgment normal.   Nursing note and vitals reviewed.      Emergency Department Course     ECG:  ECG taken at 1545, ECG read at 1606  Normal sinus rhythm  Left axis deviation  Abnormal ECG  Rate 90 bpm. AZ interval 154 ms. QRS duration 98 ms.  QT/QTc 352/430 ms. P-R-T axes 50 -44 57.    Laboratory:  Laboratory findings were communicated with the patient who voiced understanding of the findings.    Blood gas: pH venous 7.19 (LL), PO2 venous 21 (L), bicarbonate venous 17 (L)  Blood type: O+  M.6  Blood morphology: pending  Reticulocyte count: 3.1% (H)  Lactate dehydrogenase: 379 (H)  CBC: WBC 5.1, HGB 6.4 (LL), PLT 94 (L)  BMP: potassium 6.6 (HH), CO2 19 (L), glucose 138 (H), BUN 47 (H), GFR 41 (L), o/w WNL (Creatinine 1.76 (H))    Interventions:  1622 NS 1L IV Bolus   1748 NS 1L IV Bolus   1829 NS 1L IV Bolus     Emergency Department Course:  Nursing notes and vitals reviewed.  I performed an exam of the patient as documented above.   I discussed the treatment plan with the patient. They expressed understanding of this plan and consented to discharge. They will be discharged home with instructions for care and follow up. In addition, the patient will return to the emergency department if their symptoms persist, worsen, if new symptoms arise or if there is any concern.  All questions were answered.     I personally reviewed the ;ab results with the patient and answered all related questions prior to discharge.  Impression & Plan      Medical Decision Making:  Pt presents was told to come in due to elevated potassium. Patient is awake and alert with GCS of 15. Patients lab tests show critically elevated potassium at 6.9. Patients bicarb is only 18. Venous blood gas shows a significant metabolic acidosis. Patient is noted to be anemic as well. I cannot rule out hemolysis, renal tubular acidosis. Patients had a history of similar problems but never this extent. I did discuss the care of this patient with his nephrologist who also agrees to admission. Patient himself refuses to be admitted as he feels that hes had this before. Patient was explained on multiple occasions that high potassium, low Hgb, metabolic acidosis are all critical causes for heart  arrhythmia and death. Patient understand these complication, asked me to give him fluids and recheck his potassium and if its lower to just send him home. This is performed at his request and patient was signed out against medical advice. I do not recommend a discharged with a pH of 7.19, a drop of his Hgb to 6.4 and a persistent elevated potassium is likely secondary to potassium and hydrogen ion shifts due to his significant metabolic acidosis. Pt is competent and feels like the improvement in his potassium is enough as he has had this problem before and requests to be discharged, Due to the severe metabolic derangements and anemia, I felt patient is high risk for discharge and requeste that he sign out against medical advice, and encouraged to return if he felt worse or changed his mind, also encouraged to follow up with his transplant service, as potassium needs close monitoring.    Diagnosis:    ICD-10-CM    1. Hyperpotassemia E87.5    2. Acidosis E87.2    3. Anemia, unspecified type D64.9      Disposition:   Discharged     Scribe Disclosure:  I, Rosales Garay, am serving as a scribe at 3:56 PM on 10/20/2017 to document services personally performed by Shay Clark MD, based on my observations and the provider's statements to me.     [facility] EMERGENCY DEPARTMENT       Shay Clark MD  10/23/17 [id number]

## 2017-10-20 NOTE — TELEPHONE ENCOUNTER
I called the patient to schedule his surgery. I was under the impression he could have surgery in December. He let me know that he need to have surgery in January. I offered 01/05/2018 and he said that date would work. I asked him about pre-op and he would like to do it with PAC. I confirmed time, date, location and clinic ([facility]) with him. I asked him how he would like to receive his surgery packet and he would like it mailed to him.

## 2017-10-20 NOTE — ED AVS SNAPSHOT
[facility] Emergency Department    [address] [place] [address]    Phone:  [phone]    Fax:  [phone]                                       Camacho Bhagat   MRN: [id number]    Department:  [facility] Emergency Department   Date of Visit:  10/20/2017           Patient Information     Date Of Birth          1965        Your diagnoses for this visit were:     Hyperpotassemia     Acidosis     Anemia, unspecified type        You were seen by Shay Clark MD.      Follow-up Information     Follow up with Shay Kirkpatrick MD In 1 day.    Specialty:  Internal Medicine    Contact information:    [place] [address]          Discharge Instructions       You have decided to leave, though your lab work has shown elevated potassium level, low hemoglobin, and acidosis or acidity of the blood stream.    Please follow up with your regular physician for reassessment of your potassium and hemoglobin.    We are happy to see you again, if you change your mind about admission.          Anemia  Anemia is a condition that occurs when your body does not have enough healthy red blood cells (RBCs). RBCs are the parts of your blood that carry oxygen throughout your body. A protein called hemoglobin allows your RBCs to absorb and release oxygen. Without enough RBCs or hemoglobin, your body doesn't get enough oxygen. Symptoms of anemia may then occur.    What are the symptoms of anemia?  Some people with anemia have no symptoms. But most people have symptoms that range from mild to severe. These can include:    Tiredness (fatigue)    Weakness    Pale skin    Shortness of breath    Dizziness or fainting    Rapid heartbeat    Trouble doing normal amounts of activity    Jaundice (yellowing of your eyes, skin, or mouth; dark urine)  What causes anemia?  Anemia can occur when your body:    Loses too much blood    Does not make enough RBCs    Destroys your RBCs at a faster rate than it can  replace them    Does not make a normal amount of hemoglobin in your RBCs  These problems can occur for many reasons, including:    A condition that you are born with (congenital or inherited), such as sickle cell disease or thalassemia    Heavy bleeding for any reason, including injury, surgery, childbirth, or even heavy menstrual periods    Being low in certain nutrients, such as iron, folate, or vitamin B12, possibly from a poor diet or a condition like celiac disease or Crohn's disease    Certain chronic conditions like diabetes, arthritis, or kidney disease    Certain chronic infections like tuberculosis or HIV    Exposure to certain medicines, such as those used for chemotherapy  There are different types of anemia. Your healthcare provider can tell you more about the type of anemia you have and what may have caused it.  How is anemia diagnosed?  To diagnose anemia, your healthcare provider orders blood tests. These can include:    Complete blood cell count (CBC). This test measures the amounts of the different types of blood cells.    Blood smear. This test checks the size and shape of your blood cells. To do the test, a drop of your blood is viewed under a microscope. A stain is used to make the blood cells easier to see.    Iron studies. These tests measure the amount of iron in your blood. Your body needs iron to make hemoglobin in your RBCs.    Vitamin B12 and folate studies. These tests check for some of the components that help give RBCs a normal size and shape.    Reticulocyte count. This test measures the amount of new RBCs that your bone marrow makes.    Hemoglobin electrophoresis. This test checks for problems with your hemoglobin in RBCs.  How is anemia treated?  Treatment for anemia is based on the type of anemia, its cause, and the severity of your symptoms. Treatments may include:    Diet changes. This involves increasing the amount of certain nutrients in your diet, such as iron, vitamin B12, or  folate. Your healthcare provider may also prescribe nutrient supplements.    Medicines. Certain medicines treat the cause of your anemia. Others help build new RBCs or relieve symptoms. If a medicine is the cause of your anemia, you may need to stop or change it.    Blood transfusions. Replacing some of your blood can increase the number of healthy RBCs in your body.    Surgery. In some cases, your doctor may do surgery to treat the underlying cause of anemia. If you need surgery, your healthcare provider will explain the procedure and outline the risks and benefits for you.  What are the long-term concerns?  If you have a certain type of anemia, you can expect a full recovery after treatment. If you have other types of anemia (especially a type you're born with), you will need to manage it for life. Your doctor can tell you more.  Date Last Reviewed: 12/1/2016 2000-2017 [employer]. [address] [place] [address]. All rights reserved. This information is not intended as a substitute for professional medical care. Always follow your healthcare professional's instructions.          Future Appointments        Provider Department Dept Phone Center    10/23/2017 2:30 PM Estefani Beal MD [facility] [phone] [facility]    12/1/2017 8:45 AM Toñito Camejo MD [facility] Hepatology [phone] [facility]    12/13/2017 10:30 AM Sara Dihn MD [facility] Surgery [phone] [facility]    12/13/2017 12:30 PM PAC Advanced Practice Provider [facility] [phone] [facility]    12/13/2017 1:30 PM PAC Nurse [facility] [phone] [facility]    12/13/2017 2:00 PM PAC Anesthesiologist [facility] [phone] [facility]      24 Hour Appointment Hotline       To make an appointment at any [facility], call [phone]). If you don't have a family doctor or clinic, we  will help you find one. [facility] clinics are conveniently located to serve the needs of you and your family.             Review of your medicines      Our records show that you are taking the medicines listed below. If these are incorrect, please call your family doctor or clinic.        Dose / Directions Last dose taken    acetaminophen 325 MG tablet   Commonly known as:  TYLENOL   Dose:  650 mg   Quantity:  100 tablet        Take 2 tablets (650 mg) by mouth every 4 hours as needed for mild pain   Refills:  3        amLODIPine 5 MG tablet   Commonly known as:  NORVASC   Dose:  10 mg   Quantity:  60 tablet        Take 2 tablets (10 mg) by mouth daily   Refills:  3        amoxicillin-clavulanate 500-125 MG per tablet   Commonly known as:  AUGMENTIN   Dose:  1 tablet   Quantity:  30 tablet        Take 1 tablet by mouth 3 times daily   Refills:  0        cyclobenzaprine 10 MG tablet   Commonly known as:  FLEXERIL   Dose:  10 mg   Quantity:  90 tablet        Take 1 tablet (10 mg) by mouth 3 times daily as needed for muscle spasms   Refills:  0        fludrocortisone 0.1 MG tablet   Commonly known as:  FLORINEF   Dose:  0.2 mg   Quantity:  60 tablet        Take 2 tablets (0.2 mg) by mouth daily For elevated potassium   Refills:  3        furosemide 20 MG tablet   Commonly known as:  LASIX   Dose:  20 mg   Quantity:  30 tablet        Take 1 tablet (20 mg) by mouth daily   Refills:  3        GABAPENTIN PO   Dose:  300 mg        Take 300 mg by mouth 3 times daily   Refills:  0        insulin glargine 100 UNIT/ML injection   Commonly known as:  LANTUS   Dose:  50 Units        Inject 50 Units Subcutaneous every morning   Refills:  0        linagliptin 5 MG Tabs tablet   Commonly known as:  TRADJENTA   Dose:  5 mg        Take 5 mg by mouth daily   Refills:  0        loperamide 2 MG capsule   Commonly known as:  IMODIUM   Dose:  4 mg   Quantity:  120 capsule        Take 2 capsules (4 mg) by mouth 2 times daily   Refills:  3         OMEPRAZOLE PO        Take by mouth every morning   Refills:  0        oxyCODONE 10 MG IR tablet   Commonly known as:  ROXICODONE   Dose:  5 mg   Quantity:  30 tablet        Take 0.5 tablets (5 mg) by mouth every 4 hours as needed for moderate to severe pain   Refills:  0        sodium bicarbonate 650 MG tablet   Dose:  650 mg   Quantity:  90 tablet        Take 1 tablet (650 mg) by mouth 3 times daily   Refills:  5        sodium chloride (PF) 0.9% PF flush   Quantity:  600 mL        Flush 12 french drain with 10mls NS BID, flush 24 Fr drain with 20mls NS BID   Refills:  3        sodium polystyrene 0.25 GM/ML suspension   Commonly known as:  KAYEXALATE   Dose:  60 g   Quantity:  240 mL        Take 240 mLs (60 g) by mouth once for 1 dose Avoid taking other oral medications 3 hours before or after this medication.   Refills:  0        * tacrolimus 5 MG capsule   Commonly known as:  GENERIC EQUIVALENT   Dose:  5 mg   Quantity:  60 capsule        Take 1 capsule (5 mg) by mouth every 12 hours *take with 2 mg capsules for total dose 7 mg every 12 hours   Refills:  11        * tacrolimus 1 MG capsule   Commonly known as:  GENERIC EQUIVALENT   Dose:  2 mg   Quantity:  60 capsule        Take 2 capsules (2 mg) by mouth every 12 hours *take with 5 mg capsules for total of 7 mg every 12 hours   Refills:  11        tadalafil 5 MG tablet   Commonly known as:  CIALIS   Dose:  5 mg   Quantity:  30 tablet        Take 1 tablet (5 mg) by mouth daily Never use with nitroglycerin, terazosin or doxazosin.   Refills:  11        * Notice:  This list has 2 medication(s) that are the same as other medications prescribed for you. Read the directions carefully, and ask your doctor or other care provider to review them with you.            Procedures and tests performed during your visit     Procedure/Test Number of Times Performed    ABO/Rh type and screen 1    Basic metabolic panel (BMP) 2    Blood Morphology Pathologist Review 1    Blood  gas venous and oxyhgb 2    CBC + differential 1    EKG 12 lead 1    Lactate Dehydrogenase 1    Magnesium 1    Reticulocyte count 1      Orders Needing Specimen Collection     None      Pending Results     Date and Time Order Name Status Description    10/20/2017 1715 Blood Morphology Pathologist Review In process     10/20/2017 1119 Tacrolimus level In process             Pending Culture Results     No orders found from 10/18/2017 to 10/21/2017.            Pending Results Instructions     If you had any lab results that were not finalized at the time of your Discharge, you can call the ED Lab Result RN at [phone]. You will be contacted by this team for any positive Lab results or changes in treatment. The nurses are available 7 days a week from 10A to 6:30P.  You can leave a message 24 hours per day and they will return your call.        Test Results From Your Hospital Stay        10/20/2017  5:01 PM      Component Results     Component Value Ref Range & Units Status    Ph Venous 7.19 (LL) 7.32 - 7.43 pH Final    Critical Value called to and read back by  LENNY MELISSA(CASANDRA) 10.20.17 1701 WENDY      PCO2 Venous 45 40 - 50 mm Hg Final    PO2 Venous 21 (L) 25 - 47 mm Hg Final    Bicarbonate Venous 17 (L) 21 - 28 mmol/L Final    FIO2 Room Air  Final    Oxyhemoglobin Venous 43 % Final    Base Deficit Venous 10.2 mmol/L Final    Abnormal Result, Ref range: -7.7 to 1.9         10/20/2017  5:20 PM      Component Results     Component Value Ref Range & Units Status    Sodium 133 133 - 144 mmol/L Final    Potassium 6.6 (HH) 3.4 - 5.3 mmol/L Final    Critical Value called to and read back by   DINORAH TERAN( CASANDRA) 10.20.17 AT 1710 BY SANDEE      Chloride 108 94 - 109 mmol/L Final    Carbon Dioxide 19 (L) 20 - 32 mmol/L Final    Anion Gap 6 3 - 14 mmol/L Final    Glucose 138 (H) 70 - 99 mg/dL Final    Urea Nitrogen 47 (H) 7 - 30 mg/dL Final    Creatinine 1.76 (H) 0.66 - 1.25 mg/dL Final    GFR Estimate 41 (L) >60 mL/min/1.7m2  Final    Non  GFR Calc    GFR Estimate If Black 49 (L) >60 mL/min/1.7m2 Final    African American GFR Calc    Calcium 8.7 8.5 - 10.1 mg/dL Final         10/20/2017  5:20 PM      Component Results     Component Value Ref Range & Units Status    Magnesium 1.6 1.6 - 2.3 mg/dL Final         10/20/2017  4:36 PM      Component Results     Component Value Ref Range & Units Status    WBC 5.1 4.0 - 11.0 10e9/L Final    RBC Count 2.08 (L) 4.4 - 5.9 10e12/L Final    Hemoglobin 6.4 (LL) 13.3 - 17.7 g/dL Final    This result has been called to LENNY MELISSA by Shabbir Edward on 10 20 2017 at   1636, and has been read back.       Hematocrit 19.5 (L) 40.0 - 53.0 % Final    MCV 94 78 - 100 fl Final    MCH 30.8 26.5 - 33.0 pg Final    MCHC 32.8 31.5 - 36.5 g/dL Final    RDW 14.3 10.0 - 15.0 % Final    Platelet Count 94 (L) 150 - 450 10e9/L Final    Diff Method Automated Method  Final    % Neutrophils 61.4 % Final    % Lymphocytes 26.3 % Final    % Monocytes 7.9 % Final    % Eosinophils 1.2 % Final    % Basophils 0.2 % Final    % Immature Granulocytes 3.0 % Final    Nucleated RBCs 0 0 /100 Final    Absolute Neutrophil 3.1 1.6 - 8.3 10e9/L Final    Absolute Lymphocytes 1.3 0.8 - 5.3 10e9/L Final    Absolute Monocytes 0.4 0.0 - 1.3 10e9/L Final    Absolute Eosinophils 0.1 0.0 - 0.7 10e9/L Final    Absolute Basophils 0.0 0.0 - 0.2 10e9/L Final    Abs Immature Granulocytes 0.2 0 - 0.4 10e9/L Final    Absolute Nucleated RBC 0.0  Final         10/20/2017  5:25 PM      Component Results     Component Value Ref Range & Units Status    ABO O  Final    RH(D) Pos  Final    Antibody Screen Neg  Final    Test Valid Only At [facility]     Final    Specimen Expires 10/23/2017  Final         10/20/2017  5:44 PM         10/20/2017  6:03 PM      Component Results     Component Value Ref Range & Units Status    % Retic 3.1 (H) 0.5 - 2.0 % Final    Absolute Retic 67.4 25 - 95 10e9/L Final         10/20/2017  5:55 PM       Component Results     Component Value Ref Range & Units Status    Lactate Dehydrogenase 379 (H) 85 - 227 U/L Final         10/20/2017  6:50 PM      Component Results     Component Value Ref Range & Units Status    Ph Venous 7.19 (LL) 7.32 - 7.43 pH Final    Critical Value called to and read back by  LENNY MELISSA([place]) ON 10.20.17 AT 1849 BY CK      PCO2 Venous 43 40 - 50 mm Hg Final    PO2 Venous 23 (L) 25 - 47 mm Hg Final    Bicarbonate Venous 17 (L) 21 - 28 mmol/L Final    FIO2 Room Air  Final    Oxyhemoglobin Venous 28 % Final    Base Deficit Venous 10.9 mmol/L Final    Abnormal Result, Ref range: -7.7 to 1.9         10/20/2017  7:06 PM      Component Results     Component Value Ref Range & Units Status    Sodium 135 133 - 144 mmol/L Final    Potassium 5.7 (H) 3.4 - 5.3 mmol/L Final    Chloride 110 (H) 94 - 109 mmol/L Final    Carbon Dioxide 18 (L) 20 - 32 mmol/L Final    Anion Gap 7 3 - 14 mmol/L Final    Glucose 72 70 - 99 mg/dL Final    Urea Nitrogen 43 (H) 7 - 30 mg/dL Final    Creatinine 1.61 (H) 0.66 - 1.25 mg/dL Final    GFR Estimate 45 (L) >60 mL/min/1.7m2 Final    Non  GFR Calc    GFR Estimate If Black 55 (L) >60 mL/min/1.7m2 Final    African American GFR Calc    Calcium 8.1 (L) 8.5 - 10.1 mg/dL Final                Clinical Quality Measure: Blood Pressure Screening     Your blood pressure was checked while you were in the emergency department today. The last reading we obtained was  BP: 156/90 . Please read the guidelines below about what these numbers mean and what you should do about them.  If your systolic blood pressure (the top number) is less than 120 and your diastolic blood pressure (the bottom number) is less than 80, then your blood pressure is normal. There is nothing more that you need to do about it.  If your systolic blood pressure (the top number) is 120-139 or your diastolic blood pressure (the bottom number) is 80-89, your blood pressure may be higher than it should  be. You should have your blood pressure rechecked within a year by a primary care provider.  If your systolic blood pressure (the top number) is 140 or greater or your diastolic blood pressure (the bottom number) is 90 or greater, you may have high blood pressure. High blood pressure is treatable, but if left untreated over time it can put you at risk for heart attack, stroke, or kidney failure. You should have your blood pressure rechecked by a primary care provider within the next 4 weeks.  If your provider in the emergency department today gave you specific instructions to follow-up with your doctor or provider even sooner than that, you should follow that instruction and not wait for up to 4 weeks for your follow-up visit.        Thank you for choosing [facility]       Thank you for choosing [facility] for your care. Our goal is always to provide you with excellent care. Hearing back from our patients is one way we can continue to improve our services. Please take a few minutes to complete the written survey that you may receive in the mail after you visit with us. Thank you!        On The Net [employer] Information     [facility] gives you secure access to your electronic health record. If you see a primary care provider, you can also send messages to your care team and make appointments. If you have questions, please call your primary care clinic.  If you do not have a primary care provider, please call [phone] and they will assist you.        Care EveryWhere ID     This is your Care EveryWhere ID. This could be used by other organizations to access your [facility] medical records  [id number]        Equal Access to Services     FRANKLIN PORTILLO : Hadii tavo [place], waaxda luluceroadaha, qaybta kaalmada devi dolan. So [facility] [phone].    ATENCIÓN: Si habla español, tiene a zheng disposición servicios gratuitos de asistencia lingüística. Llame al [phone].    We comply with  applicable federal civil rights laws and [place] laws. We do not discriminate on the basis of race, color, national origin, age, disability, sex, sexual orientation, or gender identity.            After Visit Summary       This is your record. Keep this with you and show to your community pharmacist(s) and doctor(s) at your next visit.

## 2017-10-21 NOTE — DISCHARGE INSTRUCTIONS
You have decided to leave, though your lab work has shown elevated potassium level, low hemoglobin, and acidosis or acidity of the blood stream.    Please follow up with your regular physician for reassessment of your potassium and hemoglobin.    We are happy to see you again, if you change your mind about admission.          Anemia  Anemia is a condition that occurs when your body does not have enough healthy red blood cells (RBCs). RBCs are the parts of your blood that carry oxygen throughout your body. A protein called hemoglobin allows your RBCs to absorb and release oxygen. Without enough RBCs or hemoglobin, your body doesn't get enough oxygen. Symptoms of anemia may then occur.    What are the symptoms of anemia?  Some people with anemia have no symptoms. But most people have symptoms that range from mild to severe. These can include:    Tiredness (fatigue)    Weakness    Pale skin    Shortness of breath    Dizziness or fainting    Rapid heartbeat    Trouble doing normal amounts of activity    Jaundice (yellowing of your eyes, skin, or mouth; dark urine)  What causes anemia?  Anemia can occur when your body:    Loses too much blood    Does not make enough RBCs    Destroys your RBCs at a faster rate than it can replace them    Does not make a normal amount of hemoglobin in your RBCs  These problems can occur for many reasons, including:    A condition that you are born with (congenital or inherited), such as sickle cell disease or thalassemia    Heavy bleeding for any reason, including injury, surgery, childbirth, or even heavy menstrual periods    Being low in certain nutrients, such as iron, folate, or vitamin B12, possibly from a poor diet or a condition like celiac disease or Crohn's disease    Certain chronic conditions like diabetes, arthritis, or kidney disease    Certain chronic infections like tuberculosis or HIV    Exposure to certain medicines, such as those used for chemotherapy  There are different  types of anemia. Your healthcare provider can tell you more about the type of anemia you have and what may have caused it.  How is anemia diagnosed?  To diagnose anemia, your healthcare provider orders blood tests. These can include:    Complete blood cell count (CBC). This test measures the amounts of the different types of blood cells.    Blood smear. This test checks the size and shape of your blood cells. To do the test, a drop of your blood is viewed under a microscope. A stain is used to make the blood cells easier to see.    Iron studies. These tests measure the amount of iron in your blood. Your body needs iron to make hemoglobin in your RBCs.    Vitamin B12 and folate studies. These tests check for some of the components that help give RBCs a normal size and shape.    Reticulocyte count. This test measures the amount of new RBCs that your bone marrow makes.    Hemoglobin electrophoresis. This test checks for problems with your hemoglobin in RBCs.  How is anemia treated?  Treatment for anemia is based on the type of anemia, its cause, and the severity of your symptoms. Treatments may include:    Diet changes. This involves increasing the amount of certain nutrients in your diet, such as iron, vitamin B12, or folate. Your healthcare provider may also prescribe nutrient supplements.    Medicines. Certain medicines treat the cause of your anemia. Others help build new RBCs or relieve symptoms. If a medicine is the cause of your anemia, you may need to stop or change it.    Blood transfusions. Replacing some of your blood can increase the number of healthy RBCs in your body.    Surgery. In some cases, your doctor may do surgery to treat the underlying cause of anemia. If you need surgery, your healthcare provider will explain the procedure and outline the risks and benefits for you.  What are the long-term concerns?  If you have a certain type of anemia, you can expect a full recovery after treatment. If you  have other types of anemia (especially a type you're born with), you will need to manage it for life. Your doctor can tell you more.  Date Last Reviewed: 12/1/2016 2000-2017 [employer]. [address] [place] [address], [place] [address]. All rights reserved. This information is not intended as a substitute for professional medical care. Always follow your healthcare professional's instructions.

## 2017-10-23 ENCOUNTER — TELEPHONE (OUTPATIENT)
Dept: TRANSPLANT | Facility: CLINIC | Age: 52
End: 2017-10-23

## 2017-10-23 ENCOUNTER — DOCUMENTATION ONLY (OUTPATIENT)
Dept: TRANSPLANT | Facility: CLINIC | Age: 52
End: 2017-10-23

## 2017-10-23 ENCOUNTER — OFFICE VISIT (OUTPATIENT)
Dept: VASCULAR SURGERY | Facility: CLINIC | Age: 52
End: 2017-10-23

## 2017-10-23 VITALS
HEART RATE: 85 BPM | SYSTOLIC BLOOD PRESSURE: 133 MMHG | RESPIRATION RATE: 16 BRPM | OXYGEN SATURATION: 99 % | DIASTOLIC BLOOD PRESSURE: 84 MMHG

## 2017-10-23 DIAGNOSIS — Z94.4 HISTORY OF LIVER TRANSPLANT (H): Primary | ICD-10-CM

## 2017-10-23 DIAGNOSIS — Z79.60 LONG-TERM USE OF IMMUNOSUPPRESSANT MEDICATION: ICD-10-CM

## 2017-10-23 DIAGNOSIS — I99.8 ISCHEMIA OF DIGITS OF HAND: Primary | ICD-10-CM

## 2017-10-23 LAB
COPATH REPORT: NORMAL
INTERPRETATION ECG - MUSE: NORMAL

## 2017-10-23 RX ORDER — MYCOPHENOLIC ACID 360 MG/1
360 TABLET, DELAYED RELEASE ORAL EVERY 12 HOURS
Qty: 60 TABLET | Refills: 5
Start: 2017-10-23 | End: 2017-11-01

## 2017-10-23 RX ORDER — FLUCONAZOLE 200 MG/1
200 TABLET ORAL DAILY
Qty: 30 TABLET | Refills: 5
Start: 2017-10-23 | End: 2017-11-07

## 2017-10-23 ASSESSMENT — PAIN SCALES - GENERAL: PAINLEVEL: EXTREME PAIN (8)

## 2017-10-23 NOTE — PROGRESS NOTES
Reviewed with , pt's continued low tacrolimus levels. Pt reports adherence to taking tacrolimus.     Per :  Start Diflucan 200mg q day  (this will increase level of tacrolimus)  Start myfortic 360mg Q 12 hours.       Please call pt to start these medications today, verify plan for Camacho to recheck tacrolimus level with labs tomorrow.  Verify current dose of tacrolimus.

## 2017-10-23 NOTE — NURSING NOTE
Chief Complaint   Patient presents with     Consult     Consult ischemia on right index finger and toes on both feet        Vitals:    10/23/17 1422   BP: 133/84   BP Location: Left arm   Pulse: 85   Resp: 16   SpO2: 99%       There is no height or weight on file to calculate BMI.         Leela Villasenor LPN

## 2017-10-23 NOTE — PROGRESS NOTES
HPI   52 year old with a PMHx of CASTA[place]EDA cirrhosis s/p liver transplant in March 2017, recent admission for sepsis 2/2 typhlitis who presents for assessment of gangrene of extremities.     During admission for typhlitis, patient was on vasopressor support and subsequently had gangrene of right index finger and right great toe/second toe.  Patient has been having symptoms of pain that has gotten worse; therefore, patient was referred to vascular surgery for further management.     Patient notes severe pain of his finger with minimal pressure. He has been able to walk on his feet without any assistance or mention of claudication symptoms. In addition, patient has not had formal wound care teaching regarding his multiple wounds.    Past Medical History:   Diagnosis Date     Cancer (H)      Depressive disorder, not elsewhere classified      Esophageal reflux      Fibromyalgia 1/2009    dx with Dr Benitez( Rheum)     History of thrombophlebitis      Osteoarthritis      Other acute embolism veins 11/01    Deep vein thrombophlebitis, filter placed     Other and unspecified hyperlipidemia      Other chronic nonalcoholic liver disease     Fatty liver      Other testicular hypofunction      Pneumonia, organism unspecified(486) 10-01    Included ARDS, sepsis, and  acute renal failure; hospitalized     Rheumatoid arthritis(714.0)      Type II or unspecified type diabetes mellitus without mention of complication, not stated as uncontrolled 11/01    Managed by endocrinology     Unspecified essential hypertension 11/01    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     Unspecified sleep apnea     Uses BiPAP     Past Surgical History:   Procedure Laterality Date     BENCH LIVER N/A 3/4/2017    Procedure: BENCH LIVER;  Surgeon: Jovan Tran MD;  Location: [facility]     C NONSPECIFIC PROCEDURE      tracheostomy     C NONSPECIFIC PROCEDURE      repair of deviated septum     C NONSPECIFIC PROCEDURE  2007    Rt knee arthroscopy     C  TOTAL KNEE ARTHROPLASTY      Right knee arthroscopy     CHOLECYSTECTOMY       COLONOSCOPY N/A 2017    Procedure: COMBINED COLONOSCOPY, SINGLE OR MULTIPLE BIOPSY/POLYPECTOMY BY BIOPSY;  Colonoscopy;  Surgeon: Izaiah Montes MD;  Location: [facility]     ESOPHAGOSCOPY, GASTROSCOPY, DUODENOSCOPY (EGD), COMBINED N/A 2016    Procedure: COMBINED ESOPHAGOSCOPY, GASTROSCOPY, DUODENOSCOPY (EGD), BIOPSY SINGLE OR MULTIPLE;  Surgeon: Trent Pederson MD;  Location:  GI     ESOPHAGOSCOPY, GASTROSCOPY, DUODENOSCOPY (EGD), COMBINED N/A 2017    Procedure: COMBINED ESOPHAGOSCOPY, GASTROSCOPY, DUODENOSCOPY (EGD);;  Surgeon: Los Wynn MD;  Location: [facility]     LAPAROTOMY EXPLORATORY N/A 2017    Procedure: LAPAROTOMY EXPLORATORY;  Exploratory Laparotomy, washout;  Surgeon: Tip Zhang MD;  Location: [facility]     LAPAROTOMY EXPLORATORY N/A 2017    Procedure: LAPAROTOMY EXPLORATORY;  Exploratory Laparotomy, Washout with closure.;  Surgeon: Tip Zhang MD;  Location: [facility]     LAPAROTOMY EXPLORATORY N/A 2017    Procedure: LAPAROTOMY EXPLORATORY;  Exploratory Laparotomy, Right angelique-colectomy, end ileostomy, mucosal fistula, partial omentectomy;  Surgeon: Sara Dinh MD;  Location: [facility]     TRANSPLANT LIVER RECIPIENT  DONOR N/A 3/4/2017    Procedure: TRANSPLANT LIVER RECIPIENT  DONOR;  Surgeon: Jovan Tran MD;  Location: [facility]     Social History     Social History     Marital status:      Spouse name: N/A     Number of children: 1     Years of education: N/A     Occupational History            Social History Main Topics     Smoking status: Former Smoker     Smokeless tobacco: Former User     Types: Chew     Quit date: 10/8/2015      Comment: Has used chewing tobacco since age 15 , chewed for 20yrs      Alcohol use No      Comment: last drink about a year ago (quit )     Drug use: No     Sexual activity: Yes      Partners: Female     Other Topics Concern     Not on file     Social History Narrative    uSED TO BE      Back in school now         Current Outpatient Prescriptions   Medication     mycophenolic acid (GENERIC EQUIVALENT) 360 MG EC tablet     fluconazole (DIFLUCAN) 200 MG tablet     amoxicillin-clavulanate (AUGMENTIN) 500-125 MG per tablet     OMEPRAZOLE PO     GABAPENTIN PO     tacrolimus (GENERIC EQUIVALENT) 5 MG capsule     tacrolimus (GENERIC EQUIVALENT) 1 MG capsule     insulin glargine (LANTUS) 100 UNIT/ML injection     linagliptin (TRADJENTA) 5 MG TABS tablet     sodium chloride, PF, 0.9% PF flush     furosemide (LASIX) 20 MG tablet     sodium polystyrene (KAYEXALATE) 0.25 GM/ML suspension     fludrocortisone (FLORINEF) 0.1 MG tablet     oxyCODONE (ROXICODONE) 10 MG IR tablet     acetaminophen (TYLENOL) 325 MG tablet     loperamide (IMODIUM) 2 MG capsule     amLODIPine (NORVASC) 5 MG tablet     cyclobenzaprine (FLEXERIL) 10 MG tablet     sodium bicarbonate 650 MG tablet     tadalafil (CIALIS) 5 MG tablet     No current facility-administered medications for this visit.           Allergies   Allergen Reactions     Erythromycin GI Disturbance     Vioxx      Nausea, vomiting     Review of Systems   All other systems reviewed and are negative.        Physical Exam   Constitutional: He is well-developed, well-nourished, and in no distress.   HENT:   Head: Normocephalic and atraumatic.   Eyes: Conjunctivae are normal.   Neck: Normal range of motion.   Cardiovascular: Intact distal pulses.    Pulmonary/Chest: Effort normal.   Neurological: He is alert.   Skin:   Patient has a black eschar over his right index finger, left 1st and 2nd toes with no surrounding erythema or discharge.    Psychiatric: Affect normal.     Assessment/Plan   52 year old with a PMHx of CASTA[place]EDA cirrhosis s/p liver transplant in March 2017, recent admission for sepsis 2/2 typhlitis who presents for assessment of gangrene  of extremities.     On examination, patient does have eschars on his right index finger, right 1st/2nd toes. However, patient has excellent pulses in all 4 extremities. Therefore, these findings are likely due to patient's vasopressor support during his ICU admission as opposed to peripheral vascular disease. Vascular study on 7/2017 showed normal RONAL's of both right and left leg.     #Gangrene of Extremities  -Debridement of both 1st/2nd toes was performed in clinic and exposed healing granulation tissue underneath. Debridement of the hand was not able to be performed; therefore, patient was referred to hand surgeon.  -Given patient's excellent pulses and already formed granulation tissue, would anticipate that the eschars will simply fall off after wounds are fully healed.    -No additional vascular studies are needed at this time.  -Patient was educated on wound care for his toes (chlorhexidine and betadine) and was given supplies to take home.   -Will have patient RTC in 4 weeks to assess wound healing.     Marlee De Guzman PGY-4   Cardiology Fellow   Pager: [phone]    I personally examined the patient and agree with the findings above.  I discussed the patient with the resident / fellow and care team, and agree with the assessment and plan of care as documented in the note.  Vascular surgery staff    Vasopressor fingers and toes.  Has right index finger tip dry gangrene.  Painful to touch.  Has palpable ulnar pulse on the right, and great palmar arch and digital doppler signals. Will refer to hand surgery for management of the tip of the finger.  Has Right great toe and 2nd toe dry gangrene.  Healthy appearing tissue underneath.  Debrided back the edges and washed with betadine.  Will see back in clinic for management of the toe in 4 weeks.  Has palpable bilateral DP's and RONAL's of >1.0 from 7/2017  Estefani Beal MD

## 2017-10-23 NOTE — TELEPHONE ENCOUNTER
"Spoke with Camacho who is at home, and states he \"feels fine \" after ER visit on Friday.   He states that he can't do labs today, has an appt with vascular surgery this afternoon.  He denies SOB, or any symptoms, discussed with him that his hgb was low on Friday 6.4.   Reviewed tacrolimus dose, verified pt is taking 7mg po Q 12 hours.  Plan recheck of labs on Tues, will add type and screen to labs   "

## 2017-10-23 NOTE — LETTER
10/23/2017       RE: Caamcho Bhagat  [address] [facility] [id number]     Dear Colleague,    Thank you for referring your patient, Camacho Bhagat, to the [facility] at [facility]. Please see a copy of my visit note below.    HPI   52 year old with a PMHx of [place]STA[place]EDA cirrhosis s/p liver transplant in March 2017, recent admission for sepsis 2/2 typhlitis who presents for assessment of gangrene of extremities.     During admission for typhlitis, patient was on vasopressor support and subsequently had gangrene of right index finger and right great toe/second toe.  Patient has been having symptoms of pain that has gotten worse; therefore, patient was referred to vascular surgery for further management.     Patient notes severe pain of his finger with minimal pressure. He has been able to walk on his feet without any assistance or mention of claudication symptoms. In addition, patient has not had formal wound care teaching regarding his multiple wounds.    Past Medical History:   Diagnosis Date     Cancer (H)      Depressive disorder, not elsewhere classified      Esophageal reflux      Fibromyalgia 1/2009    dx with Dr Benitez( Rheum)     History of thrombophlebitis      Osteoarthritis      Other acute embolism veins 11/01    Deep vein thrombophlebitis, filter placed     Other and unspecified hyperlipidemia      Other chronic nonalcoholic liver disease     Fatty liver      Other testicular hypofunction      Pneumonia, organism unspecified(486) 10-01    Included ARDS, sepsis, and  acute renal failure; hospitalized     Rheumatoid arthritis(714.0)      Type II or unspecified type diabetes mellitus without mention of complication, not stated as uncontrolled 11/01    Managed by endocrinology     Unspecified essential hypertension 11/01    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     Unspecified sleep apnea     Uses BiPAP     Past Surgical History:   Procedure  Laterality Date     BENCH LIVER N/A 3/4/2017    Procedure: BENCH LIVER;  Surgeon: Jovan Tran MD;  Location: [facility]     C NONSPECIFIC PROCEDURE      tracheostomy     C NONSPECIFIC PROCEDURE      repair of deviated septum     C NONSPECIFIC PROCEDURE      Rt knee arthroscopy     C TOTAL KNEE ARTHROPLASTY  2008    Right knee arthroscopy     CHOLECYSTECTOMY       COLONOSCOPY N/A 2017    Procedure: COMBINED COLONOSCOPY, SINGLE OR MULTIPLE BIOPSY/POLYPECTOMY BY BIOPSY;  Colonoscopy;  Surgeon: Izaiah Montes MD;  Location: [facility]     ESOPHAGOSCOPY, GASTROSCOPY, DUODENOSCOPY (EGD), COMBINED N/A 2016    Procedure: COMBINED ESOPHAGOSCOPY, GASTROSCOPY, DUODENOSCOPY (EGD), BIOPSY SINGLE OR MULTIPLE;  Surgeon: Trent Pederson MD;  Location:  GI     ESOPHAGOSCOPY, GASTROSCOPY, DUODENOSCOPY (EGD), COMBINED N/A 2017    Procedure: COMBINED ESOPHAGOSCOPY, GASTROSCOPY, DUODENOSCOPY (EGD);;  Surgeon: Los Wynn MD;  Location: [facility]     LAPAROTOMY EXPLORATORY N/A 2017    Procedure: LAPAROTOMY EXPLORATORY;  Exploratory Laparotomy, washout;  Surgeon: Tip Zhang MD;  Location: [facility]     LAPAROTOMY EXPLORATORY N/A 2017    Procedure: LAPAROTOMY EXPLORATORY;  Exploratory Laparotomy, Washout with closure.;  Surgeon: Tip Zhang MD;  Location: [facility]     LAPAROTOMY EXPLORATORY N/A 2017    Procedure: LAPAROTOMY EXPLORATORY;  Exploratory Laparotomy, Right angelique-colectomy, end ileostomy, mucosal fistula, partial omentectomy;  Surgeon: Sara Dinh MD;  Location: [facility]     TRANSPLANT LIVER RECIPIENT  DONOR N/A 3/4/2017    Procedure: TRANSPLANT LIVER RECIPIENT  DONOR;  Surgeon: Jovan Tran MD;  Location: [facility]     Social History     Social History     Marital status:      Spouse name: N/A     Number of children: 1     Years of education: N/A     Occupational History            Social History Main Topics      Smoking status: Former Smoker     Smokeless tobacco: Former User     Types: Chew     Quit date: 10/8/2015      Comment: Has used chewing tobacco since age 15 , chewed for 20yrs      Alcohol use No      Comment: last drink about a year ago (quit 2001)     Drug use: No     Sexual activity: Yes     Partners: Female     Other Topics Concern     Not on file     Social History Narrative    uSED TO BE      Back in school now         Current Outpatient Prescriptions   Medication     mycophenolic acid (GENERIC EQUIVALENT) 360 MG EC tablet     fluconazole (DIFLUCAN) 200 MG tablet     amoxicillin-clavulanate (AUGMENTIN) 500-125 MG per tablet     OMEPRAZOLE PO     GABAPENTIN PO     tacrolimus (GENERIC EQUIVALENT) 5 MG capsule     tacrolimus (GENERIC EQUIVALENT) 1 MG capsule     insulin glargine (LANTUS) 100 UNIT/ML injection     linagliptin (TRADJENTA) 5 MG TABS tablet     sodium chloride, PF, 0.9% PF flush     furosemide (LASIX) 20 MG tablet     sodium polystyrene (KAYEXALATE) 0.25 GM/ML suspension     fludrocortisone (FLORINEF) 0.1 MG tablet     oxyCODONE (ROXICODONE) 10 MG IR tablet     acetaminophen (TYLENOL) 325 MG tablet     loperamide (IMODIUM) 2 MG capsule     amLODIPine (NORVASC) 5 MG tablet     cyclobenzaprine (FLEXERIL) 10 MG tablet     sodium bicarbonate 650 MG tablet     tadalafil (CIALIS) 5 MG tablet     No current facility-administered medications for this visit.           Allergies   Allergen Reactions     Erythromycin GI Disturbance     Vioxx      Nausea, vomiting     Review of Systems   All other systems reviewed and are negative.        Physical Exam   Constitutional: He is well-developed, well-nourished, and in no distress.   HENT:   Head: Normocephalic and atraumatic.   Eyes: Conjunctivae are normal.   Neck: Normal range of motion.   Cardiovascular: Intact distal pulses.    Pulmonary/Chest: Effort normal.   Neurological: He is alert.   Skin:   Patient has a black eschar over his  right index finger, left 1st and 2nd toes with no surrounding erythema or discharge.    Psychiatric: Affect normal.     Assessment/Plan   52 year old with a PMHx of CASTA[place]EDA cirrhosis s/p liver transplant in March 2017, recent admission for sepsis 2/2 typhlitis who presents for assessment of gangrene of extremities.     On examination, patient does have eschars on his right index finger, right 1st/2nd toes. However, patient has excellent pulses in all 4 extremities. Therefore, these findings are likely due to patient's vasopressor support during his ICU admission as opposed to peripheral vascular disease. Vascular study on 7/2017 showed normal RONAL's of both right and left leg.     #Gangrene of Extremities  -Debridement of both 1st/2nd toes was performed in clinic and exposed healing granulation tissue underneath. Debridement of the hand was not able to be performed; therefore, patient was referred to hand surgeon.  -Given patient's excellent pulses and already formed granulation tissue, would anticipate that the eschars will simply fall off after wounds are fully healed.    -No additional vascular studies are needed at this time.  -Patient was educated on wound care for his toes (chlorhexidine and betadine) and was given supplies to take home.   -Will have patient RTC in 4 weeks to assess wound healing.     Marlee De Guzman PGY-4   Cardiology Fellow   Pager: [phone]    I personally examined the patient and agree with the findings above.  I discussed the patient with the resident / fellow and care team, and agree with the assessment and plan of care as documented in the note.  Vascular surgery staff    Vasopressor fingers and toes.  Has right index finger tip dry gangrene.  Painful to touch.  Has palpable ulnar pulse on the right, and great palmar arch and digital doppler signals. Will refer to hand surgery for management of the tip of the finger.  Has Right great toe and 2nd toe dry gangrene.  Healthy appearing tissue  underneath.  Debrided back the edges and washed with betadine.  Will see back in clinic for management of the toe in 4 weeks.  Has palpable bilateral DP's and RONAL's of >1.0 from 7/2017  Estefani Beal MD

## 2017-10-23 NOTE — MR AVS SNAPSHOT
After Visit Summary   10/23/2017    Camacho Bhagat    MRN: [id number]           Patient Information     Date Of Birth          1965        Visit Information        Provider Department      10/23/2017 2:30 PM Estefani Beal MD [facility]         Follow-ups after your visit        Your next 10 appointments already scheduled     Nov 20, 2017  2:45 PM CST   (Arrive by 2:30 PM)   Return Vascular Visit with Estefani Beal MD   [facility] ([facility])    [address] [place] [address]   [phone]            Dec 01, 2017  8:45 AM CST   (Arrive by 8:30 AM)   Return Liver Transplant with Toñito Camejo MD   [facility] ([facility])    [address] [place] [address]   [phone]            Dec 13, 2017 10:30 AM CST   (Arrive by 10:15 AM)   Return Visit with Sara Dinh MD   [facility] ([facility])    [address] [place] [address]   [phone]            Dec 13, 2017 12:30 PM CST   (Arrive by 12:15 PM)   PAC EVALUATION with [facility] Pac Mary 5   [facility] [place] ([facility])    [address] [place] [address]   [phone]            Dec 13, 2017  1:30 PM CST   (Arrive by 1:15 PM)   PAC RN ASSESSMENT with Uc Pac Rn   [facility] [place] ([facility])    [address] [place] [address]   [phone]            Dec 13, 2017  2:00 PM CST   (Arrive by 1:45 PM)   PAC Anesthesia Consult with Uc Pac Anesthesiologist   [facility] [place] ([facility])    [address] [place] [address]   [phone]            Jan 05, 2018   Procedure with Sara DANGELO  MD Fabrizio   [facility], [place], Same Day Surgery (--)    [address]  [place] [id number] [phone]              Who to contact     Please call your clinic at [phone] to:    Ask questions about your health    Make or cancel appointments    Discuss your medicines    Learn about your test results    Speak to your doctor   If you have compliments or concerns about an experience at your clinic, or if you wish to file a complaint, please contact [facility] Patient Relations at [phone] or email us at [email or web address]         Additional Information About Your Visit        YouEye Information     YouEye gives you secure access to your electronic health record. If you see a primary care provider, you can also send messages to your care team and make appointments. If you have questions, please call your primary care clinic.  If you do not have a primary care provider, please call [phone] and they will assist you.      YouEye is an electronic gateway that provides easy, online access to your medical records. With YouEye, you can request a clinic appointment, read your test results, renew a prescription or communicate with your care team.     To access your existing account, please contact your [facility] or call [phone] for assistance.        Care EveryWhere ID     This is your Care EveryWhere ID. This could be used by other organizations to access your [place] medical records  [id number]        Your Vitals Were     Pulse Respirations Pulse Oximetry             85 16 99%          Blood Pressure from Last 3 Encounters:   10/23/17 133/84   10/20/17 156/90   10/19/17 140/80    Weight from Last 3 Encounters:   10/19/17 187 lb 4.8 oz   10/13/17 186 lb 6.4 oz   10/11/17 187 lb 12.8 oz              Today, you had the following     No orders found for display         Today's Medication Changes          These changes are  accurate as of: 10/23/17  2:59 PM.  If you have any questions, ask your nurse or doctor.               Start taking these medicines.        Dose/Directions    fluconazole 200 MG tablet   Commonly known as:  DIFLUCAN   Used for:  History of liver transplant (H), Long-term use of immunosuppressant medication   Started by:  Cheryl Pelayo LPN        Dose:  200 mg   Take 1 tablet (200 mg) by mouth daily   Quantity:  30 tablet   Refills:  5       mycophenolic acid 360 MG EC tablet   Commonly known as:  GENERIC EQUIVALENT   Used for:  History of liver transplant (H), Long-term use of immunosuppressant medication   Started by:  Cheryl Pelayo LPN        Dose:  360 mg   Take 1 tablet (360 mg) by mouth every 12 hours   Quantity:  60 tablet   Refills:  5            Where to get your medicines      Some of these will need a paper prescription and others can be bought over the counter.  Ask your nurse if you have questions.     You don't need a prescription for these medications     fluconazole 200 MG tablet    mycophenolic acid 360 MG EC tablet                Primary Care Provider    Shay Kirkpatrick MD       PWB  MD [id number]        Equal Access to Services     [facility]: Hadii tavo ku hadasho Soomaali, waaxda luqadaha, qaybta kaalmada adeegyada, waxay sarah craig . So [facility] [phone].    ATENCIÓN: Si habla español, tiene a zheng disposición servicios gratuitos de asistencia lingüística. Llame al [phone].    We comply with applicable federal civil rights laws and [place] laws. We do not discriminate on the basis of race, color, national origin, age, disability, sex, sexual orientation, or gender identity.            Thank you!     Thank you for choosing [facility]  for your care. Our goal is always to provide you with excellent care. Hearing back from our patients is one way we can continue to improve our services. Please take a few minutes to complete the written survey that you  may receive in the mail after your visit with us. Thank you!             Your Updated Medication List - Protect others around you: Learn how to safely use, store and throw away your medicines at www.disposemymeds.org.          This list is accurate as of: 10/23/17  2:59 PM.  Always use your most recent med list.                   Brand Name Dispense Instructions for use Diagnosis    acetaminophen 325 MG tablet    TYLENOL    100 tablet    Take 2 tablets (650 mg) by mouth every 4 hours as needed for mild pain    Osteoarthritis, unspecified osteoarthritis type, unspecified site       amLODIPine 5 MG tablet    NORVASC    60 tablet    Take 2 tablets (10 mg) by mouth daily    History of liver transplant (H), Elevated blood pressure reading, Long-term use of immunosuppressant medication       amoxicillin-clavulanate 500-125 MG per tablet    AUGMENTIN    30 tablet    Take 1 tablet by mouth 3 times daily    Status post liver transplantation (H), Long-term use of immunosuppressant medication       cyclobenzaprine 10 MG tablet    FLEXERIL    90 tablet    Take 1 tablet (10 mg) by mouth 3 times daily as needed for muscle spasms    Immunosuppression (H)       fluconazole 200 MG tablet    DIFLUCAN    30 tablet    Take 1 tablet (200 mg) by mouth daily    History of liver transplant (H), Long-term use of immunosuppressant medication       fludrocortisone 0.1 MG tablet    FLORINEF    60 tablet    Take 2 tablets (0.2 mg) by mouth daily For elevated potassium    History of liver transplant (H), Long-term use of immunosuppressant medication       furosemide 20 MG tablet    LASIX    30 tablet    Take 1 tablet (20 mg) by mouth daily    Hyperkalemia       GABAPENTIN PO      Take 300 mg by mouth 3 times daily        insulin glargine 100 UNIT/ML injection    LANTUS     Inject 50 Units Subcutaneous every morning        linagliptin 5 MG Tabs tablet    TRADJENTA     Take 5 mg by mouth daily        loperamide 2 MG capsule    IMODIUM    120  capsule    Take 2 capsules (4 mg) by mouth 2 times daily    S/P right hemicolectomy       mycophenolic acid 360 MG EC tablet    GENERIC EQUIVALENT    60 tablet    Take 1 tablet (360 mg) by mouth every 12 hours    History of liver transplant (H), Long-term use of immunosuppressant medication       OMEPRAZOLE PO      Take by mouth every morning        oxyCODONE 10 MG IR tablet    ROXICODONE    30 tablet    Take 0.5 tablets (5 mg) by mouth every 4 hours as needed for moderate to severe pain    History of liver transplant (H)       sodium bicarbonate 650 MG tablet     90 tablet    Take 1 tablet (650 mg) by mouth 3 times daily    Liver transplant recipient (H)       sodium chloride (PF) 0.9% PF flush     600 mL    Flush 12 french drain with 10mls NS BID, flush 24 Fr drain with 20mls NS BID    Intra-abdominal abscess (H)       sodium polystyrene 0.25 GM/ML suspension    KAYEXALATE    240 mL    Take 240 mLs (60 g) by mouth once for 1 dose Avoid taking other oral medications 3 hours before or after this medication.        * tacrolimus 5 MG capsule    GENERIC EQUIVALENT    60 capsule    Take 1 capsule (5 mg) by mouth every 12 hours *take with 2 mg capsules for total dose 7 mg every 12 hours    Liver transplant recipient (H)       * tacrolimus 1 MG capsule    GENERIC EQUIVALENT    60 capsule    Take 2 capsules (2 mg) by mouth every 12 hours *take with 5 mg capsules for total of 7 mg every 12 hours    Liver transplant recipient (H)       tadalafil 5 MG tablet    CIALIS    30 tablet    Take 1 tablet (5 mg) by mouth daily Never use with nitroglycerin, terazosin or doxazosin.    Drug-induced erectile dysfunction       * Notice:  This list has 2 medication(s) that are the same as other medications prescribed for you. Read the directions carefully, and ask your doctor or other care provider to review them with you.

## 2017-10-24 ENCOUNTER — HOSPITAL ENCOUNTER (OUTPATIENT)
Dept: LAB | Facility: CLINIC | Age: 52
Discharge: HOME | End: 2017-10-24
Attending: INTERNAL MEDICINE | Admitting: INTERNAL MEDICINE
Payer: COMMERCIAL

## 2017-10-24 ENCOUNTER — TELEPHONE (OUTPATIENT)
Dept: TRANSPLANT | Facility: CLINIC | Age: 52
End: 2017-10-24

## 2017-10-24 ENCOUNTER — HOSPITAL ENCOUNTER (INPATIENT)
Facility: CLINIC | Age: 52
LOS: 3 days | Discharge: HOME | End: 2017-10-27
Attending: EMERGENCY MEDICINE | Admitting: PEDIATRICS
Payer: COMMERCIAL

## 2017-10-24 DIAGNOSIS — Z94.4 HISTORY OF LIVER TRANSPLANT (H): ICD-10-CM

## 2017-10-24 DIAGNOSIS — Z94.4 LIVER TRANSPLANT RECIPIENT (H): ICD-10-CM

## 2017-10-24 DIAGNOSIS — Z79.60 LONG-TERM USE OF IMMUNOSUPPRESSANT MEDICATION: ICD-10-CM

## 2017-10-24 DIAGNOSIS — C22.0 HEPATOCELLULAR CARCINOMA (H): ICD-10-CM

## 2017-10-24 DIAGNOSIS — E87.5 HYPERKALEMIA: ICD-10-CM

## 2017-10-24 DIAGNOSIS — D72.819 DECREASED WHITE BLOOD CELL COUNT: ICD-10-CM

## 2017-10-24 DIAGNOSIS — D84.9 IMMUNOSUPPRESSED STATUS (H): ICD-10-CM

## 2017-10-24 LAB
ALBUMIN SERPL-MCNC: 2.6 G/DL (ref 3.4–5)
ALBUMIN SERPL-MCNC: 2.7 G/DL (ref 3.4–5)
ALP SERPL-CCNC: 224 U/L (ref 40–150)
ALP SERPL-CCNC: 252 U/L (ref 40–150)
ALT SERPL W P-5'-P-CCNC: 23 U/L (ref 0–70)
ALT SERPL W P-5'-P-CCNC: 25 U/L (ref 0–70)
ANION GAP SERPL CALCULATED.3IONS-SCNC: 10 MMOL/L (ref 3–14)
ANION GAP SERPL CALCULATED.3IONS-SCNC: 6 MMOL/L (ref 3–14)
ANION GAP SERPL CALCULATED.3IONS-SCNC: 7 MMOL/L (ref 3–14)
AST SERPL W P-5'-P-CCNC: 32 U/L (ref 0–45)
AST SERPL W P-5'-P-CCNC: 34 U/L (ref 0–45)
BASOPHILS # BLD AUTO: 0 10E9/L (ref 0–0.2)
BASOPHILS # BLD AUTO: 0 10E9/L (ref 0–0.2)
BASOPHILS NFR BLD AUTO: 0 %
BASOPHILS NFR BLD AUTO: 1 %
BILIRUB DIRECT SERPL-MCNC: 0.1 MG/DL (ref 0–0.2)
BILIRUB SERPL-MCNC: 0.4 MG/DL (ref 0.2–1.3)
BILIRUB SERPL-MCNC: 0.5 MG/DL (ref 0.2–1.3)
BLD PROD TYP BPU: NORMAL
BLD UNIT ID BPU: 0
BLOOD PRODUCT CODE: NORMAL
BPU ID: NORMAL
BUN SERPL-MCNC: 41 MG/DL (ref 7–30)
BUN SERPL-MCNC: 41 MG/DL (ref 7–30)
BUN SERPL-MCNC: 43 MG/DL (ref 7–30)
C COLI+JEJUNI+LARI FUSA STL QL NAA+PROBE: NOT DETECTED
CALCIUM SERPL-MCNC: 8.3 MG/DL (ref 8.5–10.1)
CALCIUM SERPL-MCNC: 8.3 MG/DL (ref 8.5–10.1)
CALCIUM SERPL-MCNC: 8.4 MG/DL (ref 8.5–10.1)
CHLORIDE SERPL-SCNC: 108 MMOL/L (ref 94–109)
CHLORIDE SERPL-SCNC: 110 MMOL/L (ref 94–109)
CHLORIDE SERPL-SCNC: 110 MMOL/L (ref 94–109)
CO2 SERPL-SCNC: 17 MMOL/L (ref 20–32)
CO2 SERPL-SCNC: 18 MMOL/L (ref 20–32)
CO2 SERPL-SCNC: 18 MMOL/L (ref 20–32)
CREAT SERPL-MCNC: 1.75 MG/DL (ref 0.66–1.25)
CREAT SERPL-MCNC: 1.82 MG/DL (ref 0.66–1.25)
CREAT SERPL-MCNC: 1.84 MG/DL (ref 0.66–1.25)
DIFFERENTIAL METHOD BLD: ABNORMAL
DIFFERENTIAL METHOD BLD: NORMAL
EC STX1 GENE STL QL NAA+PROBE: NOT DETECTED
EC STX2 GENE STL QL NAA+PROBE: NOT DETECTED
ENTERIC PATHOGEN COMMENT: NORMAL
EOSINOPHIL # BLD AUTO: 0 10E9/L (ref 0–0.7)
EOSINOPHIL # BLD AUTO: 0.1 10E9/L (ref 0–0.7)
EOSINOPHIL NFR BLD AUTO: 0 %
EOSINOPHIL NFR BLD AUTO: 1.1 %
ERYTHROCYTE [DISTWIDTH] IN BLOOD BY AUTOMATED COUNT: 14.8 % (ref 10–15)
ERYTHROCYTE [DISTWIDTH] IN BLOOD BY AUTOMATED COUNT: 15 % (ref 10–15)
FOLATE SERPL-MCNC: 13.3 NG/ML
GFR SERPL CREATININE-BSD FRML MDRD: 39 ML/MIN/1.7M2
GFR SERPL CREATININE-BSD FRML MDRD: 39 ML/MIN/1.7M2
GFR SERPL CREATININE-BSD FRML MDRD: 41 ML/MIN/1.7M2
GLUCOSE BLDC GLUCOMTR-MCNC: 169 MG/DL (ref 70–99)
GLUCOSE BLDC GLUCOMTR-MCNC: 209 MG/DL (ref 70–99)
GLUCOSE BLDC GLUCOMTR-MCNC: 91 MG/DL (ref 70–99)
GLUCOSE SERPL-MCNC: 136 MG/DL (ref 70–99)
GLUCOSE SERPL-MCNC: 221 MG/DL (ref 70–99)
GLUCOSE SERPL-MCNC: 231 MG/DL (ref 70–99)
HCT VFR BLD AUTO: 17.4 % (ref 40–53)
HCT VFR BLD AUTO: 20.8 % (ref 40–53)
HGB BLD-MCNC: 5.7 G/DL (ref 13.3–17.7)
HGB BLD-MCNC: 6.8 G/DL (ref 13.3–17.7)
HGB BLD-MCNC: 7.3 G/DL (ref 13.3–17.7)
IMM GRANULOCYTES # BLD: 0 10E9/L (ref 0–0.4)
IMM GRANULOCYTES NFR BLD: 0.7 %
LYMPHOCYTES # BLD AUTO: 1.4 10E9/L (ref 0.8–5.3)
LYMPHOCYTES # BLD AUTO: 1.8 10E9/L (ref 0.8–5.3)
LYMPHOCYTES NFR BLD AUTO: 30 %
LYMPHOCYTES NFR BLD AUTO: 41.2 %
MAGNESIUM SERPL-MCNC: 1.3 MG/DL (ref 1.6–2.3)
MAGNESIUM SERPL-MCNC: 1.3 MG/DL (ref 1.6–2.3)
MAGNESIUM SERPL-MCNC: 1.4 MG/DL (ref 1.6–2.3)
MCH RBC QN AUTO: 30.2 PG (ref 26.5–33)
MCH RBC QN AUTO: 30.9 PG (ref 26.5–33)
MCHC RBC AUTO-ENTMCNC: 32.7 G/DL (ref 31.5–36.5)
MCHC RBC AUTO-ENTMCNC: 32.8 G/DL (ref 31.5–36.5)
MCV RBC AUTO: 92 FL (ref 78–100)
MCV RBC AUTO: 95 FL (ref 78–100)
MONOCYTES # BLD AUTO: 0.1 10E9/L (ref 0–1.3)
MONOCYTES # BLD AUTO: 0.3 10E9/L (ref 0–1.3)
MONOCYTES NFR BLD AUTO: 3 %
MONOCYTES NFR BLD AUTO: 6.7 %
NEUTROPHILS # BLD AUTO: 2.3 10E9/L (ref 1.6–8.3)
NEUTROPHILS # BLD AUTO: 3 10E9/L (ref 1.6–8.3)
NEUTROPHILS NFR BLD AUTO: 50.3 %
NEUTROPHILS NFR BLD AUTO: 66 %
NOROV GI+II ORF1-ORF2 JNC STL QL NAA+PR: NOT DETECTED
NRBC # BLD AUTO: 0 10*3/UL
NRBC BLD AUTO-RTO: 0 /100
PHOSPHATE SERPL-MCNC: 3.8 MG/DL (ref 2.5–4.5)
PHOSPHATE SERPL-MCNC: 3.9 MG/DL (ref 2.5–4.5)
PLATELET # BLD AUTO: 102 10E9/L (ref 150–450)
PLATELET # BLD AUTO: 126 10E9/L (ref 150–450)
PLATELET # BLD EST: NORMAL 10*3/UL
POTASSIUM SERPL-SCNC: 6.1 MMOL/L (ref 3.4–5.3)
POTASSIUM SERPL-SCNC: 6.3 MMOL/L (ref 3.4–5.3)
POTASSIUM SERPL-SCNC: 6.9 MMOL/L (ref 3.4–5.3)
POTASSIUM SERPL-SCNC: 7.2 MMOL/L (ref 3.4–5.3)
PROT SERPL-MCNC: 7.4 G/DL (ref 6.8–8.8)
PROT SERPL-MCNC: 7.8 G/DL (ref 6.8–8.8)
RBC # BLD AUTO: 1.89 10E12/L (ref 4.4–5.9)
RBC # BLD AUTO: 2.2 10E12/L (ref 4.4–5.9)
RBC MORPH BLD: NORMAL
RVA NSP5 STL QL NAA+PROBE: NOT DETECTED
SALMONELLA SP RPOD STL QL NAA+PROBE: NOT DETECTED
SHIGELLA SP+EIEC IPAH STL QL NAA+PROBE: NOT DETECTED
SODIUM SERPL-SCNC: 133 MMOL/L (ref 133–144)
SODIUM SERPL-SCNC: 135 MMOL/L (ref 133–144)
SODIUM SERPL-SCNC: 135 MMOL/L (ref 133–144)
TACROLIMUS BLD-MCNC: 4.8 UG/L (ref 5–15)
TME LAST DOSE: ABNORMAL H
TRANSFUSION STATUS PATIENT QL: NORMAL
TRANSFUSION STATUS PATIENT QL: NORMAL
V CHOL+PARA RFBL+TRKH+TNAA STL QL NAA+PR: NOT DETECTED
VIT B12 SERPL-MCNC: 1424 PG/ML (ref 193–986)
WBC # BLD AUTO: 4.5 10E9/L (ref 4–11)
WBC # BLD AUTO: 4.5 10E9/L (ref 4–11)
Y ENTERO RECN STL QL NAA+PROBE: NOT DETECTED

## 2017-10-24 PROCEDURE — 25000125 ZZHC RX 250

## 2017-10-24 PROCEDURE — 84132 ASSAY OF SERUM POTASSIUM: CPT | Performed by: INTERNAL MEDICINE

## 2017-10-24 PROCEDURE — 99285 EMERGENCY DEPT VISIT HI MDM: CPT | Mod: 25 | Performed by: EMERGENCY MEDICINE

## 2017-10-24 PROCEDURE — 86900 BLOOD TYPING SEROLOGIC ABO: CPT | Performed by: EMERGENCY MEDICINE

## 2017-10-24 PROCEDURE — 36592 COLLECT BLOOD FROM PICC: CPT | Performed by: INTERNAL MEDICINE

## 2017-10-24 PROCEDURE — 96365 THER/PROPH/DIAG IV INF INIT: CPT | Performed by: EMERGENCY MEDICINE

## 2017-10-24 PROCEDURE — 00000146 ZZHCL STATISTIC GLUCOSE BY METER IP

## 2017-10-24 PROCEDURE — 82746 ASSAY OF FOLIC ACID SERUM: CPT | Performed by: EMERGENCY MEDICINE

## 2017-10-24 PROCEDURE — 85018 HEMOGLOBIN: CPT | Performed by: EMERGENCY MEDICINE

## 2017-10-24 PROCEDURE — 36415 COLL VENOUS BLD VENIPUNCTURE: CPT | Performed by: INTERNAL MEDICINE

## 2017-10-24 PROCEDURE — 80197 ASSAY OF TACROLIMUS: CPT | Performed by: INTERNAL MEDICINE

## 2017-10-24 PROCEDURE — 84100 ASSAY OF PHOSPHORUS: CPT | Performed by: INTERNAL MEDICINE

## 2017-10-24 PROCEDURE — 99223 1ST HOSP IP/OBS HIGH 75: CPT | Mod: AI | Performed by: PEDIATRICS

## 2017-10-24 PROCEDURE — 93010 ELECTROCARDIOGRAM REPORT: CPT | Mod: Z6 | Performed by: EMERGENCY MEDICINE

## 2017-10-24 PROCEDURE — 93005 ELECTROCARDIOGRAM TRACING: CPT | Performed by: EMERGENCY MEDICINE

## 2017-10-24 PROCEDURE — 86850 RBC ANTIBODY SCREEN: CPT | Performed by: EMERGENCY MEDICINE

## 2017-10-24 PROCEDURE — 83735 ASSAY OF MAGNESIUM: CPT | Performed by: INTERNAL MEDICINE

## 2017-10-24 PROCEDURE — 86901 BLOOD TYPING SEROLOGIC RH(D): CPT | Performed by: EMERGENCY MEDICINE

## 2017-10-24 PROCEDURE — 85025 COMPLETE CBC W/AUTO DIFF WBC: CPT | Performed by: EMERGENCY MEDICINE

## 2017-10-24 PROCEDURE — P9016 RBC LEUKOCYTES REDUCED: HCPCS | Performed by: EMERGENCY MEDICINE

## 2017-10-24 PROCEDURE — 25800025 ZZH RX 258: Performed by: EMERGENCY MEDICINE

## 2017-10-24 PROCEDURE — 87177 OVA AND PARASITES SMEARS: CPT | Performed by: INTERNAL MEDICINE

## 2017-10-24 PROCEDURE — 86923 COMPATIBILITY TEST ELECTRIC: CPT | Performed by: EMERGENCY MEDICINE

## 2017-10-24 PROCEDURE — 80053 COMPREHEN METABOLIC PANEL: CPT | Performed by: EMERGENCY MEDICINE

## 2017-10-24 PROCEDURE — 25000132 ZZH RX MED GY IP 250 OP 250 PS 637: Performed by: EMERGENCY MEDICINE

## 2017-10-24 PROCEDURE — 85004 AUTOMATED DIFF WBC COUNT: CPT | Performed by: INTERNAL MEDICINE

## 2017-10-24 PROCEDURE — 25000131 ZZH RX MED GY IP 250 OP 636 PS 637: Performed by: INTERNAL MEDICINE

## 2017-10-24 PROCEDURE — 82607 VITAMIN B-12: CPT | Performed by: EMERGENCY MEDICINE

## 2017-10-24 PROCEDURE — 21400006 ZZH R&B CCU INTERMEDIATE UMMC

## 2017-10-24 PROCEDURE — 87506 IADNA-DNA/RNA PROBE TQ 6-11: CPT | Performed by: INTERNAL MEDICINE

## 2017-10-24 PROCEDURE — 25000132 ZZH RX MED GY IP 250 OP 250 PS 637: Performed by: INTERNAL MEDICINE

## 2017-10-24 PROCEDURE — 87209 SMEAR COMPLEX STAIN: CPT | Performed by: INTERNAL MEDICINE

## 2017-10-24 PROCEDURE — 80048 BASIC METABOLIC PNL TOTAL CA: CPT | Performed by: INTERNAL MEDICINE

## 2017-10-24 PROCEDURE — 80048 BASIC METABOLIC PNL TOTAL CA: CPT | Performed by: EMERGENCY MEDICINE

## 2017-10-24 PROCEDURE — 25000128 H RX IP 250 OP 636: Performed by: EMERGENCY MEDICINE

## 2017-10-24 PROCEDURE — 99291 CRITICAL CARE FIRST HOUR: CPT | Mod: 25 | Performed by: EMERGENCY MEDICINE

## 2017-10-24 PROCEDURE — 83735 ASSAY OF MAGNESIUM: CPT | Performed by: EMERGENCY MEDICINE

## 2017-10-24 PROCEDURE — 96375 TX/PRO/DX INJ NEW DRUG ADDON: CPT | Performed by: EMERGENCY MEDICINE

## 2017-10-24 PROCEDURE — 85027 COMPLETE CBC AUTOMATED: CPT | Performed by: INTERNAL MEDICINE

## 2017-10-24 PROCEDURE — 84100 ASSAY OF PHOSPHORUS: CPT | Performed by: EMERGENCY MEDICINE

## 2017-10-24 PROCEDURE — 80076 HEPATIC FUNCTION PANEL: CPT | Performed by: INTERNAL MEDICINE

## 2017-10-24 RX ORDER — AMLODIPINE BESYLATE 10 MG/1
10 TABLET ORAL DAILY
Status: DISCONTINUED | OUTPATIENT
Start: 2017-10-25 | End: 2017-10-27 | Stop reason: HOSPADM

## 2017-10-24 RX ORDER — DEXTROSE MONOHYDRATE 25 G/50ML
25 INJECTION, SOLUTION INTRAVENOUS ONCE
Status: COMPLETED | OUTPATIENT
Start: 2017-10-24 | End: 2017-10-25

## 2017-10-24 RX ORDER — DEXTROSE MONOHYDRATE 100 MG/ML
INJECTION, SOLUTION INTRAVENOUS CONTINUOUS
Status: DISPENSED | OUTPATIENT
Start: 2017-10-24 | End: 2017-10-25

## 2017-10-24 RX ORDER — FUROSEMIDE 10 MG/ML
20 INJECTION INTRAMUSCULAR; INTRAVENOUS ONCE
Status: COMPLETED | OUTPATIENT
Start: 2017-10-24 | End: 2017-10-25

## 2017-10-24 RX ORDER — OXYCODONE HYDROCHLORIDE 5 MG/1
5 TABLET ORAL EVERY 4 HOURS PRN
Status: DISCONTINUED | OUTPATIENT
Start: 2017-10-24 | End: 2017-10-27 | Stop reason: HOSPADM

## 2017-10-24 RX ORDER — NICOTINE POLACRILEX 4 MG
15-30 LOZENGE BUCCAL
Status: DISCONTINUED | OUTPATIENT
Start: 2017-10-24 | End: 2017-10-27 | Stop reason: HOSPADM

## 2017-10-24 RX ORDER — CALCIUM GLUCONATE 94 MG/ML
2 INJECTION, SOLUTION INTRAVENOUS ONCE
Status: DISCONTINUED | OUTPATIENT
Start: 2017-10-24 | End: 2017-10-24

## 2017-10-24 RX ORDER — DEXTROSE MONOHYDRATE 25 G/50ML
50 INJECTION, SOLUTION INTRAVENOUS ONCE
Status: COMPLETED | OUTPATIENT
Start: 2017-10-24 | End: 2017-10-24

## 2017-10-24 RX ORDER — FUROSEMIDE 20 MG
20 TABLET ORAL DAILY
Status: DISCONTINUED | OUTPATIENT
Start: 2017-10-25 | End: 2017-10-25

## 2017-10-24 RX ORDER — DEXTROSE MONOHYDRATE 25 G/50ML
25-50 INJECTION, SOLUTION INTRAVENOUS
Status: DISCONTINUED | OUTPATIENT
Start: 2017-10-24 | End: 2017-10-27 | Stop reason: HOSPADM

## 2017-10-24 RX ORDER — SODIUM POLYSTYRENE SULFONATE 15 G/60ML
30 SUSPENSION ORAL; RECTAL ONCE
Status: COMPLETED | OUTPATIENT
Start: 2017-10-24 | End: 2017-10-25

## 2017-10-24 RX ORDER — LIDOCAINE HYDROCHLORIDE 10 MG/ML
1 INJECTION, SOLUTION EPIDURAL; INFILTRATION; INTRACAUDAL; PERINEURAL ONCE
Status: COMPLETED | OUTPATIENT
Start: 2017-10-24 | End: 2017-10-24

## 2017-10-24 RX ORDER — SODIUM BICARBONATE 650 MG/1
650 TABLET ORAL 3 TIMES DAILY
Status: DISCONTINUED | OUTPATIENT
Start: 2017-10-24 | End: 2017-10-25

## 2017-10-24 RX ORDER — NALOXONE HYDROCHLORIDE 0.4 MG/ML
.1-.4 INJECTION, SOLUTION INTRAMUSCULAR; INTRAVENOUS; SUBCUTANEOUS
Status: DISCONTINUED | OUTPATIENT
Start: 2017-10-24 | End: 2017-10-27 | Stop reason: HOSPADM

## 2017-10-24 RX ORDER — FLUCONAZOLE 200 MG/1
200 TABLET ORAL DAILY
Status: DISCONTINUED | OUTPATIENT
Start: 2017-10-25 | End: 2017-10-27 | Stop reason: HOSPADM

## 2017-10-24 RX ORDER — LIDOCAINE HYDROCHLORIDE 10 MG/ML
INJECTION, SOLUTION EPIDURAL; INFILTRATION; INTRACAUDAL; PERINEURAL
Status: COMPLETED
Start: 2017-10-24 | End: 2017-10-24

## 2017-10-24 RX ORDER — MYCOPHENOLIC ACID 360 MG/1
360 TABLET, DELAYED RELEASE ORAL 2 TIMES DAILY
Status: DISCONTINUED | OUTPATIENT
Start: 2017-10-24 | End: 2017-10-25

## 2017-10-24 RX ORDER — FLUDROCORTISONE ACETATE 0.1 MG/1
0.2 TABLET ORAL DAILY
Status: DISCONTINUED | OUTPATIENT
Start: 2017-10-25 | End: 2017-10-27

## 2017-10-24 RX ORDER — GABAPENTIN 300 MG/1
300 CAPSULE ORAL 3 TIMES DAILY
Status: DISCONTINUED | OUTPATIENT
Start: 2017-10-24 | End: 2017-10-27 | Stop reason: HOSPADM

## 2017-10-24 RX ORDER — FUROSEMIDE 10 MG/ML
40 INJECTION INTRAMUSCULAR; INTRAVENOUS ONCE
Status: COMPLETED | OUTPATIENT
Start: 2017-10-24 | End: 2017-10-24

## 2017-10-24 RX ADMIN — DEXTROSE MONOHYDRATE 50 ML: 25 INJECTION, SOLUTION INTRAVENOUS at 16:10

## 2017-10-24 RX ADMIN — GABAPENTIN 300 MG: 300 CAPSULE ORAL at 22:21

## 2017-10-24 RX ADMIN — LIDOCAINE HYDROCHLORIDE 10 MG: 10 INJECTION, SOLUTION EPIDURAL; INFILTRATION; INTRACAUDAL; PERINEURAL at 14:28

## 2017-10-24 RX ADMIN — HUMAN INSULIN 10 UNITS: 100 INJECTION, SOLUTION SUBCUTANEOUS at 16:16

## 2017-10-24 RX ADMIN — MYCOPHENOLIC ACID 360 MG: 360 TABLET, DELAYED RELEASE ORAL at 22:22

## 2017-10-24 RX ADMIN — SODIUM BICARBONATE 650 MG: 650 TABLET ORAL at 22:22

## 2017-10-24 RX ADMIN — FUROSEMIDE 40 MG: 10 INJECTION, SOLUTION INTRAVENOUS at 16:03

## 2017-10-24 RX ADMIN — CALCIUM GLUCONATE 2 G: 94 INJECTION, SOLUTION INTRAVENOUS at 15:50

## 2017-10-24 ASSESSMENT — ENCOUNTER SYMPTOMS
FEVER: 0
TROUBLE SWALLOWING: 0
SHORTNESS OF BREATH: 0
ABDOMINAL PAIN: 0

## 2017-10-24 NOTE — IP AVS SNAPSHOT
[address] [address]    Phone:  [phone]                                       After Visit Summary   10/24/2017    Camacho Bhagat    MRN: [id number]           After Visit Summary Signature Page     I have received my discharge instructions, and my questions have been answered. I have discussed any challenges I see with this plan with the nurse or doctor.    ..........................................................................................................................................  Patient/Patient Representative Signature      ..........................................................................................................................................  Patient Representative Print Name and Relationship to Patient    ..................................................               ................................................  Date                                            Time    ..........................................................................................................................................  Reviewed by Signature/Title    ...................................................              ..............................................  Date                                                            Time

## 2017-10-24 NOTE — ED NOTES
ED to Floor Handoff      S:  Camacho Bhagat is a 52 year old male who speaks English and lives with family members,  in a home  They arrived in the ED by car from emergency room with a complaint of Abnormal Labs (Pt reports labs drawn this am, told K is 7.2 PT denies dizziness or CP.)    Initial vitals were:   BP: 162/87  Pulse: 90  Heart Rate: 82  Temp: 98.2  F (36.8  C)  Resp: 16  Height: 182.9 cm (6')  Weight: 85.5 kg (188 lb 7.9 oz)  SpO2: 99 %  Allergies:   Allergies   Allergen Reactions     Erythromycin GI Disturbance     Vioxx      Nausea, vomiting   .  The meds given in the ED and their home medications are:   Current Facility-Administered Medications   Medication     sodium polystyrene (KAYEXALATE) suspension 30 g     Current Outpatient Prescriptions   Medication     mycophenolic acid (GENERIC EQUIVALENT) 360 MG EC tablet     fluconazole (DIFLUCAN) 200 MG tablet     amoxicillin-clavulanate (AUGMENTIN) 500-125 MG per tablet     OMEPRAZOLE PO     GABAPENTIN PO     tacrolimus (GENERIC EQUIVALENT) 5 MG capsule     tacrolimus (GENERIC EQUIVALENT) 1 MG capsule     insulin glargine (LANTUS) 100 UNIT/ML injection     linagliptin (TRADJENTA) 5 MG TABS tablet     furosemide (LASIX) 20 MG tablet     fludrocortisone (FLORINEF) 0.1 MG tablet     loperamide (IMODIUM) 2 MG capsule     amLODIPine (NORVASC) 5 MG tablet     sodium bicarbonate 650 MG tablet     sodium chloride, PF, 0.9% PF flush     oxyCODONE (ROXICODONE) 10 MG IR tablet     acetaminophen (TYLENOL) 325 MG tablet     cyclobenzaprine (FLEXERIL) 10 MG tablet     Social demographics are   Social History     Social History     Marital status:      Spouse name: N/A     Number of children: 1     Years of education: N/A     Occupational History            Social History Main Topics     Smoking status: Former Smoker     Smokeless tobacco: Former User     Types: Chew     Quit date: 10/8/2015      Comment: Has used chewing tobacco since age 15 ,  chewed for 20yrs      Alcohol use No      Comment: last drink about a year ago (quit 2001)     Drug use: No     Sexual activity: Yes     Partners: Female     Other Topics Concern     None     Social History Narrative    uSED TO BE      Back in school now           B:   The patient has been ill for 2 day(s) and during this time the symptoms have increased.  In the ED was diagnosed with   Final diagnoses:   Hyperkalemia    Infection/sepsis suspected:No Isolation type; Contact   A:   In the ED these meds were given:   Medications   sodium polystyrene (KAYEXALATE) suspension 30 g (not administered)   lidocaine (PF) (XYLOCAINE) 1 % injection 10 mg (10 mg Intradermal Given 10/24/17 1428)   calcium gluconate 2 g in D5W 100 mL intermittent infusion (0 g Intravenous Stopped 10/24/17 1650)   furosemide (LASIX) injection 40 mg (40 mg Intravenous Given 10/24/17 1603)   dextrose 50 % injection 50 mL (50 mLs Intravenous Given 10/24/17 1610)   insulin (regular) (HumuLIN R/NovoLIN R) injection 10 Units (10 Units Intravenous Given 10/24/17 1616)     Drips running?  Yes  Labs results   Labs Ordered and Resulted from Time of ED Arrival Up to the Time of Departure from the ED   CBC WITH PLATELETS DIFFERENTIAL - Abnormal; Notable for the following:        Result Value    RBC Count 1.89 (*)     Hemoglobin 5.7 (*)     Hematocrit 17.4 (*)     Platelet Count 102 (*)     All other components within normal limits   MAGNESIUM - Abnormal; Notable for the following:     Magnesium 1.3 (*)     All other components within normal limits   COMPREHENSIVE METABOLIC PANEL - Abnormal; Notable for the following:     Potassium 6.9 (*)     Chloride 110 (*)     Carbon Dioxide 18 (*)     Glucose 231 (*)     Urea Nitrogen 41 (*)     Creatinine 1.75 (*)     GFR Estimate 41 (*)     GFR Estimate If Black 50 (*)     Calcium 8.4 (*)     Albumin 2.6 (*)     Alkaline Phosphatase 224 (*)     All other components within normal limits   GLUCOSE  BY METER - Abnormal; Notable for the following:     Glucose 209 (*)     All other components within normal limits   GLUCOSE BY METER - Abnormal; Notable for the following:     Glucose 169 (*)     All other components within normal limits   PHOSPHORUS   FOLATE   VITAMIN B12   BASIC METABOLIC PANEL   HEMOGLOBIN   POTASSIUM   BASIC METABOLIC PANEL   CARDIAC CONTINUOUS MONITORING   PULSE OXIMETRY NURSING   STRICT INTAKE AND OUTPUT   RED BLOOD CELL PREPARE ORDER UNIT   ABO/RH TYPE AND SCREEN   BLOOD COMPONENT   RED BLOOD CELL PREPARE ORDER UNIT   ABO/RH TYPE AND SCREEN   RED BLOOD CELL HAVE AVAILABLE     Imaging Studies: No results found for this or any previous visit (from the past 24 hour(s)).  Recent vital signs /86  Pulse 90  Temp 98.9  F (37.2  C) (Oral)  Resp 16  Ht 1.829 m (6')  Wt 85.5 kg (188 lb 7.9 oz)  SpO2 98%  BMI 25.56 kg/m2  Cardiac Rhythm: ,   Cardiac  Cardiac Rhythm: Normal sinus rhythm  Abnormal labs/tests/findings requiring intervention:---  Pain control: good  Nausea control: good  R:   Transfer assistance needed: Independent  Family present during ED course? Yes   Family currently present? No  Pt needs tele? Yes  Code Status: Full Code  Tasks needing to be completed:---    Marcella jacinto-- [phone] [facility]

## 2017-10-24 NOTE — ED PROVIDER NOTES
History     Chief Complaint   Patient presents with     Abnormal Labs     Pt reports labs drawn this am, told K is 7.2 PT denies dizziness or CP.     HPI  Camacho Bhagat is a 52-year-old male with history of liver transplant as well as history of chronic kidney disease who presents today with hyperkalemia.  The patient has had problems with hyperkalemia in the past and this has generally being treated as an outpatient with diuretics.  He was at a clinic office and had his labs checked.  His labs demonstrated a potassium of 7.3.  Unclear if this is hemolyzed at that time.  Of note, the patient is not on dialysis.  On my evaluation the patient feels fine.  He has no complaints, he has no chest pain, no shortness breath, no problems swallowing or really any concern at all at this time.  He presents here for further evaluation of his high potassium.    I have reviewed the Medications, Allergies, Past Medical and Surgical History, and Social History in the Epic system.    Review of Systems   Constitutional: Negative for fever.   HENT: Negative for trouble swallowing.    Respiratory: Negative for shortness of breath.    Cardiovascular: Negative for chest pain.   Gastrointestinal: Negative for abdominal pain.   All other systems reviewed and are negative.      Physical Exam   BP: 162/87  Pulse: 90  Heart Rate: 82  Temp: 98.2  F (36.8  C)  Resp: 16  Height: 182.9 cm (6')  Weight: 85.5 kg (188 lb 7.9 oz)  SpO2: 99 %      Physical Exam   Constitutional: He is oriented to person, place, and time. He appears well-developed and well-nourished.   HENT:   Head: Atraumatic.   Right Ear: External ear normal.   Left Ear: External ear normal.   Eyes: Conjunctivae and EOM are normal. No scleral icterus.   Neck: Normal range of motion. Neck supple.   Cardiovascular: Normal rate and regular rhythm.    Pulmonary/Chest: Effort normal. No respiratory distress.   Abdominal: Soft. There is no tenderness. There is no rebound and no guarding.    Musculoskeletal: Normal range of motion. He exhibits no edema or tenderness.   Neurological: He is alert and oriented to person, place, and time.   Skin: Skin is warm and dry. No rash noted.   Psychiatric: He has a normal mood and affect. His behavior is normal.   Nursing note and vitals reviewed.      ED Course     ED Course     Procedures             EKG Interpretation:      Interpreted by Sanjuanita Carpenter  Time reviewed: 1351  Symptoms at time of EKG: none   Rhythm: normal sinus   Rate: normal  Axis: normal  Ectopy: none  Conduction: normal  ST Segments/ T Waves: slight peaking of anterior T waves  Q Waves: none  Comparison to prior: Unchanged    Clinical Impression: Unchanged but patient was hyperkalemic on previous    Critical Care time:  was 30 minutes for this patient excluding procedures.     Labs Ordered and Resulted from Time of ED Arrival Up to the Time of Departure from the ED   MAGNESIUM - Abnormal; Notable for the following:        Result Value    Magnesium 1.3 (*)     All other components within normal limits   COMPREHENSIVE METABOLIC PANEL - Abnormal; Notable for the following:     Potassium 6.9 (*)     Chloride 110 (*)     Carbon Dioxide 18 (*)     Glucose 231 (*)     Urea Nitrogen 41 (*)     Creatinine 1.75 (*)     GFR Estimate 41 (*)     GFR Estimate If Black 50 (*)     Calcium 8.4 (*)     Albumin 2.6 (*)     Alkaline Phosphatase 224 (*)     All other components within normal limits   CBC WITH PLATELETS DIFFERENTIAL   PHOSPHORUS   CARDIAC CONTINUOUS MONITORING   PULSE OXIMETRY NURSING       Assessments & Plan (with Medical Decision Making)   52-year-old male who presents today from the lab with concern for hyperkalemia.  The patient has vital signs and has a normal physical exam with exception of some pallor.  He has no acute complaints at this time.  We reviewed his labs and he has a potassium of 6.9 and hemoglobin of 5.7.  The patient has chronic anemia and he tends to drift between 5 and 8 so  I do not suspect that there is acute blood loss anemia at this time.  He will be transfused 1 unit PRBCs.    I do however believe that this hyperkalemia is true. In fact he has had 2 draws now done today, both of which are abnormal.  No evidence of hemolysis.  He has been following with Renal for this problem and is being treated with diuretics and Florinef but clearly things are not well controlled at this time.  His EKG shows some slight peaking of the T waves but on his previous he was hyperkalemic at that time too so it is difficult to make a comparison.  Spoke with the Renal staff on-call.  We will treat with calcium, IV insulins, and glucose.  We will also give him 1 unit PRBCs.  Patient will be admitted to the hospital on telemetry and patient was signed out to the admitting service.    This part of the document was transcribed by Amna Craven Medical Scribe.      I have reviewed the nursing notes.    I have reviewed the findings, diagnosis, plan and need for follow up with the patient.    New Prescriptions    No medications on file       Final diagnoses:   None   Mitch PERKINS, am serving as a trained medical scribe to document services personally performed by Sanjuanita Carpenter MD, based on the provider's statements to me.   Sanjuanita PERKINS MD, was physically present and have reviewed and verified the accuracy of this note documented by Mitch Roberts.     10/24/2017   [facility], [place], EMERGENCY DEPARTMENT     Sanjuanita Carpenter MD  10/24/17 1931

## 2017-10-24 NOTE — TELEPHONE ENCOUNTER
Spoke with Camacho about elevated potassium to 7.2.   Directed him to come to the ER, asked if he had someone to bring him and he does.  He expressed frustration about the potassium problems, which don't resolve.   [facility], will let transplant know that he is coming in to the ER.  Message sent to   [facility] notified of pt's pending arrival by car

## 2017-10-24 NOTE — IP AVS SNAPSHOT
MRN:[id number]                      After Visit Summary   10/24/2017    Camacho Bhagat    MRN: [id number]           Thank you!     Thank you for choosing [facility] for your care. Our goal is always to provide you with excellent care. Hearing back from our patients is one way we can continue to improve our services. Please take a few minutes to complete the written survey that you may receive in the mail after you visit with us. Thank you!        Patient Information     Date Of Birth          1965        About your hospital stay     You were admitted on:  October 24, 2017 You last received care in the:  [address] [facility]    You were discharged on:  October 27, 2017        Reason for your hospital stay       You were hospitalized for having elevation of the potassium in your body and because of low hemoglobin (anemia). You were given IV fluids and diuretics (water pills) to help remove the potassium from your body. You were given blood transfusions to help your low hemoglobin.                  Who to Call     For medical emergencies, please call 911.  For non-urgent questions about your medical care, please call your primary care provider or clinic, None          Attending Provider     Provider Specialty    Sanjuanita Carpenter MD Emergency Medicine    Melissa, Jesse Higgins MD Internal Medicine       Primary Care Provider    Shay Kirkpatrick MD      After Care Instructions     Activity       Your activity upon discharge: activity as tolerated            Diet       Follow this diet upon discharge: Orders Placed This Encounter      Combination Diet 2 gm K Diet; 4176-1083 Calories: High Consistent CHO (4-7 CHO units/meal)                  Follow-up Appointments     Adult [facility] Follow-up and recommended labs and tests       Follow up with primary care provider, Shay Kirkpatrick, within 7 days to evaluate medication change and to evaluate treatment change.  The following labs/tests are recommended:  BMP, tacrolimus.      You will need to follow up with nephology within 7 days    Appointments on [place] and/or [facility] (with [facility] or [facility] provider or service). Call [phone] if you haven't heard regarding these appointments within 7 days of discharge.            Follow Up and recommended labs and tests       BMP and tacrolimus levels on Monday, Wednesday, Friday    You will need to call and speak with transplant GI on Monday                  Your next 10 appointments already scheduled     Oct 30, 2017 12:00 PM CDT   LAB with  LAB   [facility] ([facility])    [address] [place] [address] [phone]           Patient must bring picture ID. Patient should be prepared to give a urine specimen  Please do not eat 10-12 hours before your appointment if you are coming in fasting for labs on lipids, cholesterol, or glucose (sugar). Pregnant women should follow their Care Team instructions. Water with medications is okay. Do not drink coffee or other fluids. If you have concerns about taking  your medications, please ask at office or if scheduling via [employer], send a message by clicking on Secure Messaging, Message Your Care Team.            Nov 02, 2017  8:30 AM CDT   (Arrive by 8:15 AM)   Return Visit with Jovany Curiel MD   [facility] ([facility])    [address] [place] [address]  4th [facility] [id number] [phone]            Nov 20, 2017  2:45 PM CST   (Arrive by 2:30 PM)   Return Vascular Visit with Estefani Beal MD   [facility] Vascular [facility] ([facility])    [address] [place] [address] [phone]            Dec 01, 2017  8:45 AM CST   (Arrive by 8:30 AM)   Return Liver Transplant with Toñito Camejo MD   [facility] Hepatology ([facility])    [address] [place] [address]    [phone]            Dec 13, 2017 10:30 AM CST   (Arrive by 10:15 AM)   Return Visit with Sara Dinh MD   [facility]al Surgery ([facility])    [address] [place] [address]   [phone]            Dec 13, 2017 12:30 PM CST   (Arrive by 12:15 PM)   PAC EVALUATION with Uc Pac Mary 5   [facility] Preoperative Assessment [place] ([facility])    [address] [place] [address]   [phone]            Dec 13, 2017  1:30 PM CST   (Arrive by 1:15 PM)   PAC RN ASSESSMENT with Uc Pac Rn   [facility] Preoperative Assessment [place] ([facility])    [address] [place] [address]   [phone]            Dec 13, 2017  2:00 PM CST   (Arrive by 1:45 PM)   PAC Anesthesia Consult with Uc Pac Anesthesiologist   [facility] Preoperative Assessment [place] ([facility])    [address] [place] [address]   [phone]            Jan 05, 2018   Procedure with Sara Dinh MD   [facility] [place] Same Day Surgery (--)    [address] [facility] [address]   [phone]              Additional Services     Medication Therapy Management Referral       Reason for referral:  on more than 5 medications and managing chronic disease & ED or hospital visit in last 6 months.    This service is designed to help you get the most from your medications.  A specially trained pharmacist will work closely with you and your doctors  to solve any problems related to your medications and to help you get the   best results from taking them.      The Medication Therapy Management staff will call you to schedule an appointment.            Nephrology Adult Referral       Will need nephrology follow up within one week per inpatient team                  Future tests that were ordered for you     Tacrolimus level            Basic metabolic panel           Tacrolimus level           Basic metabolic panel           Tacrolimus level           Basic metabolic panel                 Pending Results     Date and Time Order Name Status Description    10/27/2017 0650 EBV DNA PCR Quantitative Whole Blood In process     10/26/2017 0446 Tacrolimus level In process             Statement of Approval     Ordered          10/27/17 1808  I have reviewed and agree with all the recommendations and orders detailed in this document.  EFFECTIVE NOW     Approved and electronically signed by:  Sherry Albert MD             Admission Information     Date & Time Provider Department Dept. Phone    10/24/2017 Jesse Torres MD [address] [facility] [phone]      Your Vitals Were     Blood Pressure Pulse Temperature Respirations Height Weight    146/80 (BP Location: Left arm) 74 97.9  F (36.6  C) (Oral) 16 1.829 m (6') 84.2 kg (185 lb 11.2 oz)    Pulse Oximetry BMI (Body Mass Index)                99% 25.19 kg/m2          MyChart Information     [facility] [employer] gives you secure access to your electronic health record. If you see a primary care provider, you can also send messages to your care team and make appointments. If you have questions, please call your primary care clinic.  If you do not have a primary care provider, please call [phone] and they will assist you.        Care EveryWhere ID     This is your Care EveryWhere ID. This could be used by other organizations to access your [facility] medical records  [id number]        Equal Access to Services     FRANKLIN PORTILLO : Hadii tavo duttono Sojose, waaxda luqadaha, qaybta kaalmada magdaleno, devi duckworth. So [facility] [phone].    ATENCIÓN: Si habla español, tiene a zheng disposición servicios gratuitos de asistencia lingüística. Llame al [phone].    We comply with applicable federal civil rights laws and [place] laws. We do not discriminate on the basis of  race, color, national origin, age, disability, sex, sexual orientation, or gender identity.               Review of your medicines      CONTINUE these medicines which may have CHANGED, or have new prescriptions. If we are uncertain of the size of tablets/capsules you have at home, strength may be listed as something that might have changed.        Dose / Directions    fludrocortisone 0.1 MG tablet   Commonly known as:  FLORINEF   This may have changed:    - how much to take  - additional instructions   Used for:  Hyperkalemia        Dose:  0.3 mg   Start taking on:  10/28/2017   Take 3 tablets (0.3 mg) by mouth daily   Quantity:  90 tablet   Refills:  3       furosemide 20 MG tablet   Commonly known as:  LASIX   This may have changed:  when to take this   Used for:  Hyperkalemia        Dose:  20 mg   Take 1 tablet (20 mg) by mouth 2 times daily   Quantity:  60 tablet   Refills:  3       sodium bicarbonate 650 MG tablet   This may have changed:  how much to take   Used for:  Hyperkalemia        Dose:  1300 mg   Take 2 tablets (1,300 mg) by mouth 3 times daily   Quantity:  180 tablet   Refills:  3         CONTINUE these medicines which have NOT CHANGED        Dose / Directions    amLODIPine 5 MG tablet   Commonly known as:  NORVASC   Used for:  History of liver transplant (H), Elevated blood pressure reading, Long-term use of immunosuppressant medication        Dose:  10 mg   Take 2 tablets (10 mg) by mouth daily   Quantity:  60 tablet   Refills:  3       cyclobenzaprine 10 MG tablet   Commonly known as:  FLEXERIL   Used for:  Immunosuppression (H)        Dose:  10 mg   Take 1 tablet (10 mg) by mouth 3 times daily as needed for muscle spasms   Quantity:  90 tablet   Refills:  0       fluconazole 200 MG tablet   Commonly known as:  DIFLUCAN   Used for:  History of liver transplant (H), Long-term use of immunosuppressant medication        Dose:  200 mg   Take 1 tablet (200 mg) by mouth daily   Quantity:  30 tablet    Refills:  5       GABAPENTIN PO        Dose:  300 mg   Take 300 mg by mouth 3 times daily   Refills:  0       insulin glargine 100 UNIT/ML injection   Commonly known as:  LANTUS        Dose:  50 Units   Inject 50 Units Subcutaneous every morning   Refills:  0       linagliptin 5 MG Tabs tablet   Commonly known as:  TRADJENTA        Dose:  5 mg   Take 5 mg by mouth daily   Refills:  0       loperamide 2 MG capsule   Commonly known as:  IMODIUM   Used for:  S/P right hemicolectomy        Dose:  4 mg   Take 2 capsules (4 mg) by mouth 2 times daily   Quantity:  120 capsule   Refills:  3       mycophenolic acid 360 MG EC tablet   Commonly known as:  GENERIC EQUIVALENT   Used for:  History of liver transplant (H), Long-term use of immunosuppressant medication        Dose:  360 mg   Take 1 tablet (360 mg) by mouth every 12 hours   Quantity:  60 tablet   Refills:  5       OMEPRAZOLE PO        Dose:  20 mg   Take 20 mg by mouth every morning   Refills:  0       sodium chloride (PF) 0.9% PF flush   Used for:  Intra-abdominal abscess (H)        Flush 12 Tajik drain with 10mls NS BID, flush 24 Fr drain with 20mls NS BID   Quantity:  600 mL   Refills:  3       * tacrolimus 5 MG capsule   Commonly known as:  GENERIC EQUIVALENT   Used for:  Liver transplant recipient (H)        Dose:  5 mg   Take 1 capsule (5 mg) by mouth every 12 hours *take with 2 mg capsules for total dose 7 mg every 12 hours   Quantity:  60 capsule   Refills:  11       * tacrolimus 1 MG capsule   Commonly known as:  GENERIC EQUIVALENT   Used for:  Liver transplant recipient (H)        Dose:  2 mg   Take 2 capsules (2 mg) by mouth every 12 hours *take with 5 mg capsules for total of 7 mg every 12 hours   Quantity:  60 capsule   Refills:  11       * Notice:  This list has 2 medication(s) that are the same as other medications prescribed for you. Read the directions carefully, and ask your doctor or other care provider to review them with you.      STOP taking      acetaminophen 325 MG tablet   Commonly known as:  TYLENOL           amoxicillin-clavulanate 500-125 MG per tablet   Commonly known as:  AUGMENTIN           oxyCODONE 10 MG IR tablet   Commonly known as:  ROXICODONE                Where to get your medicines      These medications were sent to [facility] - [place], [place] - [address] [facility]  [address] [facility] [address]     Phone:  [phone]     fludrocortisone 0.1 MG tablet    furosemide 20 MG tablet    sodium bicarbonate 650 MG tablet                Protect others around you: Learn how to safely use, store and throw away your medicines at www.disposemymeds.org.             Medication List: This is a list of all your medications and when to take them. Check marks below indicate your daily home schedule. Keep this list as a reference.      Medications           Morning Afternoon Evening Bedtime As Needed    amLODIPine 5 MG tablet   Commonly known as:  NORVASC   Take 2 tablets (10 mg) by mouth daily   Last time this was given:  10 mg on 10/27/2017  8:06 AM                                   cyclobenzaprine 10 MG tablet   Commonly known as:  FLEXERIL   Take 1 tablet (10 mg) by mouth 3 times daily as needed for muscle spasms                                   fluconazole 200 MG tablet   Commonly known as:  DIFLUCAN   Take 1 tablet (200 mg) by mouth daily   Last time this was given:  200 mg on 10/27/2017  8:06 AM                                   fludrocortisone 0.1 MG tablet   Commonly known as:  FLORINEF   Take 3 tablets (0.3 mg) by mouth daily   Start taking on:  10/28/2017   Last time this was given:  0.2 mg on 10/27/2017  8:06 AM                                   furosemide 20 MG tablet   Commonly known as:  LASIX   Take 1 tablet (20 mg) by mouth 2 times daily   Last time this was given:  20 mg on 10/27/2017  4:45 PM                                      GABAPENTIN PO   Take 300 mg by mouth 3 times daily   Last time this was  given:  300 mg on 10/27/2017  1:51 PM                                         insulin glargine 100 UNIT/ML injection   Commonly known as:  LANTUS   Inject 50 Units Subcutaneous every morning   Last time this was given:  50 Units on 10/27/2017  8:06 AM                                   linagliptin 5 MG Tabs tablet   Commonly known as:  TRADJENTA   Take 5 mg by mouth daily                                   loperamide 2 MG capsule   Commonly known as:  IMODIUM   Take 2 capsules (4 mg) by mouth 2 times daily                                      mycophenolic acid 360 MG EC tablet   Commonly known as:  GENERIC EQUIVALENT   Take 1 tablet (360 mg) by mouth every 12 hours   Last time this was given:  360 mg on 10/27/2017 10:03 AM                                      OMEPRAZOLE PO   Take 20 mg by mouth every morning   Last time this was given:  20 mg on 10/27/2017  8:06 AM                                   sodium bicarbonate 650 MG tablet   Take 2 tablets (1,300 mg) by mouth 3 times daily   Last time this was given:  1,300 mg on 10/27/2017  1:51 PM                                         sodium chloride (PF) 0.9% PF flush   Flush 12 Faroese drain with 10mls NS BID, flush 24 Fr drain with 20mls NS BID   Last time this was given:  10/27/2017 12:19 PM                                      * tacrolimus 5 MG capsule   Commonly known as:  GENERIC EQUIVALENT   Take 1 capsule (5 mg) by mouth every 12 hours *take with 2 mg capsules for total dose 7 mg every 12 hours   Last time this was given:  7 mg on 10/26/2017  9:37 PM                                      * tacrolimus 1 MG capsule   Commonly known as:  GENERIC EQUIVALENT   Take 2 capsules (2 mg) by mouth every 12 hours *take with 5 mg capsules for total of 7 mg every 12 hours   Last time this was given:  7 mg on 10/26/2017  9:37 PM                                      * Notice:  This list has 2 medication(s) that are the same as other medications prescribed for you. Read the  directions carefully, and ask your doctor or other care provider to review them with you.

## 2017-10-24 NOTE — TELEPHONE ENCOUNTER
DATE:  10/24/2017   TIME OF RECEIPT FROM LAB:  1:10 PM  LAB TEST:  Potassium, Hgb  LAB VALUE:  7.2, 6.8  RESULTS GIVEN WITH READ-BACK TO (PROVIDER):  Abril Doty RN  TIME LAB VALUE REPORTED TO PROVIDER:   1:11 PM

## 2017-10-24 NOTE — H&P
[facility]   [place] [facility]  History and Physical    Camacho Bhagat  [id number]  October 24, 2017      Assessment & Plan   Camacho Bhagat is a 51 year old White male with a past medical history significant for ESLD/cryptogenic cirrhosis (CASTAÑEDA), HCC s/p TACE 09/2016, who is s/p liver transplantation in 03/2017, h/o neutropenic colitis/ishcemic bowel s/p colectomy 07/2017 who presents with recurrent hyperkalemia and anemia.  He is hemodynamically stable on admission with no EKG changes or life threatening arrhythmias.        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     - Continuous telemetry monitoring.    - Shifted in th ED with Insulin/d50, Calcium gluconate. Given Lasix and Kayexalate on admission.    - Trend K every 6 hours until resolved and monitor closely for arrhythmias.      Acute on chronic normocytic anemia:   - No clear source for blood loss. Thought to be chronic related to kidney disease      Cryptogenic cirrhosis/CASTAÑEDA   S/P Liver transplantation in 03/2017  HCC S/P TACE 09/2016:   - His post transplant course was complicated with infection including colitis, CMV viremia/colitis, and abscess/intraperitoneal infections which required an exploratory laparotomy and colectomy in 07/2017.     Immunosuppression:  - Tacrolimus 7 mg BID.  Recheck Tacrolimus level in the AM.    - Mycophenoleic acid 360 mg BID.      Cellulitis/gangrene:  Patient seen by vascular surgery in clinic yesterday with debridement performed. Does not appear acutely infected on exam today and no recommendation for further vascular studies  -wound care consult placed    H/O  "neutropenic colitis s/p ileostomy:    - Endorses increased stool output via the ostomy over the past several weeks. Perhaps related to his Augmentin which was started in clinic last week.     H/O CMV viremia: (CMV R- D+).  Treated in 07/2017.    - Weekly CMV quant levels followed by ID.        Chronic Medical Conditions:   Type II DM:   Essential HTN: Continue Amlodipine.      FEN: Renal diet.    DVT Prophylaxis: Low Risk/Ambulatory with no VTE prophylaxis indicated  Code Status: Full Code  Disposition: Expected discharge in 2-3 days upon further evaluation and treatment for hyperkalemia, anemia. Admitted to inpatient Medicine [facility] service.    Pt seen and discussed with Dr. Torres, who agrees with the assessment and plan.      Primary Care Physician   Shay Kirkpatrick    Chief Complaint   Hyperkalemia, increased ostomy output.      History is obtained from the patient and review of electronic medical record.      History of Present Illness   Camacho Bhagat is a 51 year old White male with a past medical history significant for cryptogenic cirrhosis (CASTAÑEDA), HCC s/p TACE 09/2016, who is s/p liver transplantation in 03/2017 who presents with recurrent hyperkalemia and anemia.  He is hemodynamically stable on admission with no EKG changes or life threatening arrhythmias.      He states he has had distant history of hyperkalemia, but this had resolved for a period of time. Has noted it particularly worsened since his bowel resection and increase in his tacrolimus level. Has seen renal once, but is unclear if they had told him an underlying cause of his hyperkalemia. He is frustrated with the recurrence of his condition as he overall feels very well. He only notes that he is continuing to have \"pretty good\" stool output with changing his ostomy bag 4-5x daily. He also notes a change in the color of his ostomy output over the last 2 days with it becoming more green in color. He has had no changes in his urine output, is " having no chest pain or palpitations, SOB, or numbness/tingling.     Of note, he was evaluated by nephrology in September who felt his hyperkalemia was likely secondary to his prograf causing hypoaldosteronism.       Past Medical History    I have reviewed this patient's medical history and updated it with pertinent information if needed.   Past Medical History:   Diagnosis Date     Cancer (H)      Depressive disorder, not elsewhere classified      Esophageal reflux      Fibromyalgia 1/2009    dx with Dr Benitez( Rheum)     History of thrombophlebitis      Osteoarthritis      Other acute embolism veins 11/01    Deep vein thrombophlebitis, filter placed     Other and unspecified hyperlipidemia      Other chronic nonalcoholic liver disease     Fatty liver      Other testicular hypofunction      Pneumonia, organism [phone]    Included ARDS, sepsis, and  acute renal failure; hospitalized     Rheumatoid arthritis(714.0)      Type II or unspecified type diabetes mellitus without mention of complication, not stated as uncontrolled 11/01    Managed by endocrinology     Unspecified essential hypertension 11/01    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  Past Surgical History:   Procedure Laterality Date     BENCH LIVER N/A 3/4/2017    Procedure: BENCH LIVER;  Surgeon: Jovan Tran MD;  Location: [facility] OR      NONSPECIFIC PROCEDURE      tracheostomy     C NONSPECIFIC PROCEDURE      repair of deviated septum     C NONSPECIFIC PROCEDURE  2007    Rt knee arthroscopy     C TOTAL KNEE ARTHROPLASTY  2008    Right knee arthroscopy     CHOLECYSTECTOMY       COLONOSCOPY N/A 7/21/2017    Procedure: COMBINED COLONOSCOPY, SINGLE OR MULTIPLE BIOPSY/POLYPECTOMY BY BIOPSY;  Colonoscopy;  Surgeon: Izaiah Montes MD;  Location: [facility] GI      ESOPHAGOSCOPY, GASTROSCOPY, DUODENOSCOPY (EGD), COMBINED N/A 2016    Procedure: COMBINED ESOPHAGOSCOPY, GASTROSCOPY, DUODENOSCOPY (EGD), BIOPSY SINGLE OR MULTIPLE;  Surgeon: Trent Pederson MD;  Location: [facility] GI     ESOPHAGOSCOPY, GASTROSCOPY, DUODENOSCOPY (EGD), COMBINED N/A 2017    Procedure: COMBINED ESOPHAGOSCOPY, GASTROSCOPY, DUODENOSCOPY (EGD);;  Surgeon: Los Wynn MD;  Location: [facility] GI     LAPAROTOMY EXPLORATORY N/A 2017    Procedure: LAPAROTOMY EXPLORATORY;  Exploratory Laparotomy, washout;  Surgeon: Tip Zhang MD;  Location: [facility] OR     LAPAROTOMY EXPLORATORY N/A 2017    Procedure: LAPAROTOMY EXPLORATORY;  Exploratory Laparotomy, Washout with closure.;  Surgeon: Tip Zhang MD;  Location: [facility] OR     LAPAROTOMY EXPLORATORY N/A 2017    Procedure: LAPAROTOMY EXPLORATORY;  Exploratory Laparotomy, Right angelique-colectomy, end ileostomy, mucosal fistula, partial omentectomy;  Surgeon: Sara Dinh MD;  Location: [facility] OR     TRANSPLANT LIVER RECIPIENT  DONOR N/A 3/4/2017    Procedure: TRANSPLANT LIVER RECIPIENT  DONOR;  Surgeon: Jovan Tran MD;  Location: [facility] OR       Prior to Admission Medications   Prior to Admission Medications   Prescriptions Last Dose Informant Patient Reported? Taking?   GABAPENTIN PO   Yes No   Sig: Take 300 mg by mouth 3 times daily   OMEPRAZOLE PO   Yes No   Sig: Take by mouth every morning   acetaminophen (TYLENOL) 325 MG tablet  Self No No   Sig: Take 2 tablets (650 mg) by mouth every 4 hours as needed for mild pain   amLODIPine (NORVASC) 5 MG tablet  Self No No   Sig: Take 2 tablets (10 mg) by mouth daily   amoxicillin-clavulanate (AUGMENTIN) 500-125 MG per tablet   No No   Sig: Take 1 tablet by mouth 3 times daily   cyclobenzaprine (FLEXERIL) 10 MG tablet  Self No No   Sig: Take 1 tablet (10 mg) by mouth 3 times daily as needed for muscle spasms   fluconazole (DIFLUCAN) 200 MG tablet   No No   Sig:  Take 1 tablet (200 mg) by mouth daily   fludrocortisone (FLORINEF) 0.1 MG tablet   Yes No   Sig: Take 2 tablets (0.2 mg) by mouth daily For elevated potassium   furosemide (LASIX) 20 MG tablet   No No   Sig: Take 1 tablet (20 mg) by mouth daily   insulin glargine (LANTUS) 100 UNIT/ML injection   Yes No   Sig: Inject 50 Units Subcutaneous every morning   linagliptin (TRADJENTA) 5 MG TABS tablet   Yes No   Sig: Take 5 mg by mouth daily   loperamide (IMODIUM) 2 MG capsule  Self No No   Sig: Take 2 capsules (4 mg) by mouth 2 times daily   mycophenolic acid (GENERIC EQUIVALENT) 360 MG EC tablet   No No   Sig: Take 1 tablet (360 mg) by mouth every 12 hours   oxyCODONE (ROXICODONE) 10 MG IR tablet  Self No No   Sig: Take 0.5 tablets (5 mg) by mouth every 4 hours as needed for moderate to severe pain   sodium bicarbonate 650 MG tablet  Self No No   Sig: Take 1 tablet (650 mg) by mouth 3 times daily   sodium chloride, PF, 0.9% PF flush   No No   Sig: Flush 12 french drain with 10mls NS BID, flush 24 Fr drain with 20mls NS BID   sodium polystyrene (KAYEXALATE) 0.25 GM/ML suspension   Yes No   Sig: Take 240 mLs (60 g) by mouth once for 1 dose Avoid taking other oral medications 3 hours before or after this medication.   tacrolimus (GENERIC EQUIVALENT) 1 MG capsule   No No   Sig: Take 2 capsules (2 mg) by mouth every 12 hours *take with 5 mg capsules for total of 7 mg every 12 hours   tacrolimus (GENERIC EQUIVALENT) 5 MG capsule   No No   Sig: Take 1 capsule (5 mg) by mouth every 12 hours *take with 2 mg capsules for total dose 7 mg every 12 hours   tadalafil (CIALIS) 5 MG tablet  Self No No   Sig: Take 1 tablet (5 mg) by mouth daily Never use with nitroglycerin, terazosin or doxazosin.      Facility-Administered Medications: None     Allergies   Allergies   Allergen Reactions     Erythromycin GI Disturbance     Vioxx      Nausea, vomiting       Social History   I have reviewed this patient's social history and updated it with  pertinent information if needed. Camacho Bhagat  reports that he has quit smoking. He quit smokeless tobacco use about 2 years ago. His smokeless tobacco use included Chew. He reports that he does not drink alcohol or use illicit drugs.    Family History   I have reviewed this patient's family history and updated it with pertinent information if needed.   Family History   Problem Relation Age of Onset     DIABETES Father      Hypertension Father      Substance Abuse Father      Arthritis Mother      CANCER Mother      Thyroid     Thyroid Disease Mother      Other Cancer Mother      Colon Cancer No family hx of      Hyperlipidemia No family hx of      Coronary Artery Disease No family hx of      CEREBROVASCULAR DISEASE No family hx of      Breast Cancer No family hx of      Prostate Cancer No family hx of        Review of Systems   The 10 point Review of Systems is negative other than noted in the HPI or here.     Physical Exam   Temp: 98.2  F (36.8  C) Temp src: Oral BP: 147/77 Pulse: 90 Heart Rate: 82 Resp: 16 SpO2: 100 % O2 Device: None (Room air)    Vital Signs with Ranges  Temp:  [98.2  F (36.8  C)] 98.2  F (36.8  C)  Pulse:  [90] 90  Heart Rate:  [82-84] 82  Resp:  [16] 16  BP: (147-162)/(77-87) 147/77  SpO2:  [99 %-100 %] 100 %  188 lbs 7.89 oz    GEN:  Alert, oriented x 3, appears comfortable, NAD.  HEENT:  Normocephalic/atraumatic, no scleral icterus, no nasal discharge, mouth moist.  CV:  Regular rate and rhythm, no murmur or JVD.  S1 + S2 noted, no S3 or S4.  LUNGS:  Clear to auscultation bilaterally   ABD:  Active bowel sounds, soft, non-tender/non-distended.  No rebound/guarding/rigidity.  EXT:  No edema or cyanosis.  Hands/feet warm to touch with good signs of peripheral perfusion.   SKIN:  Dry to touch, no exanthems noted in the visualized areas.  NEURO:  No new focal deficits appreciated.    Data   Data reviewed today:  I personally reviewed all the relevant imaging, EKG, and labs.      EKG: Sinus R at  83, with no peaked T waves, no acute ST-T wave changes.        Recent Labs  Lab 10/24/17  1427 10/24/17  1426 10/24/17  1135 10/20/17  1827 10/20/17  1610   WBC  --  4.5 4.5  --  5.1   HGB  --  5.7* 6.8*  --  6.4*   MCV  --  92 95  --  94   PLT  --  102* 126*  --  94*     --  135 135 133   POTASSIUM 6.9*  --  7.2* 5.7* 6.6*   CHLORIDE 110*  --  110* 110* 108   CO2 18*  --  18* 18* 19*   BUN 41*  --  41* 43* 47*   CR 1.75*  --  1.82* 1.61* 1.76*   ANIONGAP 6  --  7 7 6   JACOB 8.4*  --  8.3* 8.1* 8.7   *  --  221* 72 138*   ALBUMIN 2.6*  --  2.7*  --   --    PROTTOTAL 7.4  --  7.8  --   --    BILITOTAL 0.4  --  0.5  --   --    ALKPHOS 224*  --  252*  --   --    ALT 23  --  25  --   --    AST 32  --  34  --   --

## 2017-10-24 NOTE — ED NOTES
Bed: IN02  Expected date:   Expected time:   Means of arrival:   Comments:  Camacho Olayinka  1965  Post liver transplant  High K+--7.2  Has been having trouble maintaining normal K+

## 2017-10-24 NOTE — PHARMACY-ADMISSION MEDICATION HISTORY
Admission medication history interview status for the 10/24/2017 admission is complete. See Epic admission navigator for allergy information, pharmacy, prior to admission medications and immunization status.     Medication history interview sources:  Patient, Chart Review    Changes made to PTA medication list (reason)  Added: none    Deleted:   -Kayexalate 0.25 g/mL: take 240 mLs po once (therapy completed)  -Cialis 5 mg tablet: take 1 tablet po daily (patient no longer uses)    Changed:   -Omeprazole take by mouth every morning --> take 20 mg capsule po qam    Additional medication history information (including reliability of information, actions taken by pharmacist):  -Patient was a mildly reliable medication historian as he knew the names of his medications and how he takes them, but unsure about some medication strengths. Dosages confirmed through chart review.  -Of note, both mycophenolate and fluconazole were started yesterday (10/23/17) 2/2 continued subtherapeutic tacrolimus levels. Patient states he has taken two doses of mycophenolate and fluconazole (yesterday PM and this morning). Patient reports being adherent with 7 mg q12h tacrolimus dosing.   -Augmentin was started on 10/16/17 for a 10-day course of therapy for gangrene toe and finger with redness and tenderness around both.  -Influenza vaccine 10/2/17.    Prior to Admission medications    Medication Sig Last Dose Taking? Auth Provider   mycophenolic acid (GENERIC EQUIVALENT) 360 MG EC tablet Take 1 tablet (360 mg) by mouth every 12 hours 10/24/2017 at 1130 Yes Jovan Tran MD   fluconazole (DIFLUCAN) 200 MG tablet Take 1 tablet (200 mg) by mouth daily 10/24/2017 at AM Yes Jovan Tran MD   amoxicillin-clavulanate (AUGMENTIN) 500-125 MG per tablet Take 1 tablet by mouth 3 times daily 10/24/2017 at AM Yes Tip Zhang MD   OMEPRAZOLE PO Take 20 mg by mouth every morning  10/24/2017 at AM Yes Reported, Patient   GABAPENTIN  PO Take 300 mg by mouth 3 times daily 10/24/2017 at AM Yes Reported, Patient   tacrolimus (GENERIC EQUIVALENT) 5 MG capsule Take 1 capsule (5 mg) by mouth every 12 hours *take with 2 mg capsules for total dose 7 mg every 12 hours 10/24/2017 at 1130 Yes Jovan Tran MD   tacrolimus (GENERIC EQUIVALENT) 1 MG capsule Take 2 capsules (2 mg) by mouth every 12 hours *take with 5 mg capsules for total of 7 mg every 12 hours 10/24/2017 at 1130 Yes Jovan Tran MD   insulin glargine (LANTUS) 100 UNIT/ML injection Inject 50 Units Subcutaneous every morning 10/24/2017 at AM Yes Reported, Patient   linagliptin (TRADJENTA) 5 MG TABS tablet Take 5 mg by mouth daily 10/23/2017 at AM Yes Reported, Patient   furosemide (LASIX) 20 MG tablet Take 1 tablet (20 mg) by mouth daily 10/24/2017 at AM Yes Jovan Tran MD   fludrocortisone (FLORINEF) 0.1 MG tablet Take 2 tablets (0.2 mg) by mouth daily For elevated potassium 10/24/2017 at AM Yes Jovan Tran MD   loperamide (IMODIUM) 2 MG capsule Take 2 capsules (4 mg) by mouth 2 times daily 10/24/2017 at AM Yes Felicia Tolliver APRN CNP   amLODIPine (NORVASC) 5 MG tablet Take 2 tablets (10 mg) by mouth daily 10/24/2017 at AM Yes Felicia Tolliver APRN CNP   sodium bicarbonate 650 MG tablet Take 1 tablet (650 mg) by mouth 3 times daily 10/24/2017 at AM Yes Felicia Tolliver APRN CNP   sodium chloride, PF, 0.9% PF flush Flush 12 french drain with 10mls NS BID, flush 24 Fr drain with 20mls NS BID   Jovan Tran MD   oxyCODONE (ROXICODONE) 10 MG IR tablet Take 0.5 tablets (5 mg) by mouth every 4 hours as needed for moderate to severe pain More than a month at Unknown time  Jovan Tran MD   acetaminophen (TYLENOL) 325 MG tablet Take 2 tablets (650 mg) by mouth every 4 hours as needed for mild pain Never Taken  Felicia Tolliver, APRN CNP   cyclobenzaprine (FLEXERIL) 10 MG tablet Take 1 tablet (10 mg) by mouth 3 times daily as needed for  muscle spasms 10/21/2017 at Unknown time  Felicia Tolliver, LAMIN CNP         Medication history completed by:     Angelique Rodríguez, MikelD

## 2017-10-25 LAB
ALBUMIN SERPL-MCNC: 2.6 G/DL (ref 3.4–5)
ALP SERPL-CCNC: 491 U/L (ref 40–150)
ALT SERPL W P-5'-P-CCNC: 55 U/L (ref 0–70)
ANION GAP SERPL CALCULATED.3IONS-SCNC: 9 MMOL/L (ref 3–14)
ANION GAP SERPL CALCULATED.3IONS-SCNC: 9 MMOL/L (ref 3–14)
AST SERPL W P-5'-P-CCNC: 160 U/L (ref 0–45)
BASOPHILS # BLD AUTO: 0 10E9/L (ref 0–0.2)
BASOPHILS NFR BLD AUTO: 0.2 %
BILIRUB SERPL-MCNC: 1.1 MG/DL (ref 0.2–1.3)
BLD PROD TYP BPU: NORMAL
BLD UNIT ID BPU: 0
BLOOD PRODUCT CODE: NORMAL
BPU ID: NORMAL
BUN SERPL-MCNC: 40 MG/DL (ref 7–30)
BUN SERPL-MCNC: 40 MG/DL (ref 7–30)
CALCIUM SERPL-MCNC: 8.3 MG/DL (ref 8.5–10.1)
CALCIUM SERPL-MCNC: 8.6 MG/DL (ref 8.5–10.1)
CHLORIDE SERPL-SCNC: 106 MMOL/L (ref 94–109)
CHLORIDE SERPL-SCNC: 108 MMOL/L (ref 94–109)
CK SERPL-CCNC: 31 U/L (ref 30–300)
CMV DNA SPEC NAA+PROBE-ACNC: NORMAL [IU]/ML
CMV DNA SPEC NAA+PROBE-LOG#: NORMAL {LOG_IU}/ML
CO2 SERPL-SCNC: 17 MMOL/L (ref 20–32)
CO2 SERPL-SCNC: 17 MMOL/L (ref 20–32)
COPATH REPORT: NORMAL
CREAT SERPL-MCNC: 1.82 MG/DL (ref 0.66–1.25)
CREAT SERPL-MCNC: 1.94 MG/DL (ref 0.66–1.25)
DIFFERENTIAL METHOD BLD: ABNORMAL
EOSINOPHIL # BLD AUTO: 0.1 10E9/L (ref 0–0.7)
EOSINOPHIL NFR BLD AUTO: 1.2 %
ERYTHROCYTE [DISTWIDTH] IN BLOOD BY AUTOMATED COUNT: 15.1 % (ref 10–15)
ERYTHROCYTE [DISTWIDTH] IN BLOOD BY AUTOMATED COUNT: 15.6 % (ref 10–15)
FIBRINOGEN PPP-MCNC: 427 MG/DL (ref 200–420)
GFR SERPL CREATININE-BSD FRML MDRD: 36 ML/MIN/1.7M2
GFR SERPL CREATININE-BSD FRML MDRD: 39 ML/MIN/1.7M2
GLUCOSE BLDC GLUCOMTR-MCNC: 113 MG/DL (ref 70–99)
GLUCOSE BLDC GLUCOMTR-MCNC: 116 MG/DL (ref 70–99)
GLUCOSE BLDC GLUCOMTR-MCNC: 117 MG/DL (ref 70–99)
GLUCOSE BLDC GLUCOMTR-MCNC: 117 MG/DL (ref 70–99)
GLUCOSE BLDC GLUCOMTR-MCNC: 125 MG/DL (ref 70–99)
GLUCOSE BLDC GLUCOMTR-MCNC: 125 MG/DL (ref 70–99)
GLUCOSE BLDC GLUCOMTR-MCNC: 138 MG/DL (ref 70–99)
GLUCOSE BLDC GLUCOMTR-MCNC: 147 MG/DL (ref 70–99)
GLUCOSE BLDC GLUCOMTR-MCNC: 147 MG/DL (ref 70–99)
GLUCOSE BLDC GLUCOMTR-MCNC: 152 MG/DL (ref 70–99)
GLUCOSE BLDC GLUCOMTR-MCNC: 153 MG/DL (ref 70–99)
GLUCOSE BLDC GLUCOMTR-MCNC: 155 MG/DL (ref 70–99)
GLUCOSE BLDC GLUCOMTR-MCNC: 170 MG/DL (ref 70–99)
GLUCOSE SERPL-MCNC: 135 MG/DL (ref 70–99)
GLUCOSE SERPL-MCNC: 161 MG/DL (ref 70–99)
HAPTOGLOB SERPL-MCNC: 120 MG/DL (ref 15–200)
HCT VFR BLD AUTO: 19.3 % (ref 40–53)
HCT VFR BLD AUTO: 20 % (ref 40–53)
HGB BLD-MCNC: 6 G/DL (ref 13.3–17.7)
HGB BLD-MCNC: 6.7 G/DL (ref 13.3–17.7)
HGB BLD-MCNC: 7.8 G/DL (ref 13.3–17.7)
IMM GRANULOCYTES # BLD: 0 10E9/L (ref 0–0.4)
IMM GRANULOCYTES NFR BLD: 0.7 %
INTERPRETATION ECG - MUSE: NORMAL
LDH SERPL L TO P-CCNC: 448 U/L (ref 85–227)
LYMPHOCYTES # BLD AUTO: 1.7 10E9/L (ref 0.8–5.3)
LYMPHOCYTES NFR BLD AUTO: 40.4 %
MAGNESIUM SERPL-MCNC: 1.1 MG/DL (ref 1.6–2.3)
MAGNESIUM SERPL-MCNC: 1.3 MG/DL (ref 1.6–2.3)
MAGNESIUM SERPL-MCNC: 1.9 MG/DL (ref 1.6–2.3)
MCH RBC QN AUTO: 29.9 PG (ref 26.5–33)
MCH RBC QN AUTO: 30.3 PG (ref 26.5–33)
MCHC RBC AUTO-ENTMCNC: 31.1 G/DL (ref 31.5–36.5)
MCHC RBC AUTO-ENTMCNC: 33.5 G/DL (ref 31.5–36.5)
MCV RBC AUTO: 91 FL (ref 78–100)
MCV RBC AUTO: 96 FL (ref 78–100)
MONOCYTES # BLD AUTO: 0.3 10E9/L (ref 0–1.3)
MONOCYTES NFR BLD AUTO: 8.1 %
NEUTROPHILS # BLD AUTO: 2.1 10E9/L (ref 1.6–8.3)
NEUTROPHILS NFR BLD AUTO: 49.4 %
NRBC # BLD AUTO: 0 10*3/UL
NRBC BLD AUTO-RTO: 0 /100
O+P STL MICRO: NORMAL
O+P STL MICRO: NORMAL
PHOSPHATE SERPL-MCNC: 4.8 MG/DL (ref 2.5–4.5)
PLATELET # BLD AUTO: 93 10E9/L (ref 150–450)
PLATELET # BLD AUTO: 97 10E9/L (ref 150–450)
POTASSIUM SERPL-SCNC: 6 MMOL/L (ref 3.4–5.3)
POTASSIUM SERPL-SCNC: 6.2 MMOL/L (ref 3.4–5.3)
POTASSIUM SERPL-SCNC: 6.2 MMOL/L (ref 3.4–5.3)
PROT SERPL-MCNC: 7.3 G/DL (ref 6.8–8.8)
RBC # BLD AUTO: 2.01 10E12/L (ref 4.4–5.9)
RBC # BLD AUTO: 2.21 10E12/L (ref 4.4–5.9)
RETICS # AUTO: 88.8 10E9/L (ref 25–95)
RETICS/RBC NFR AUTO: 4 % (ref 0.5–2)
SODIUM SERPL-SCNC: 132 MMOL/L (ref 133–144)
SODIUM SERPL-SCNC: 134 MMOL/L (ref 133–144)
SPECIMEN SOURCE: NORMAL
SPECIMEN SOURCE: NORMAL
TACROLIMUS BLD-MCNC: 6.3 UG/L (ref 5–15)
TME LAST DOSE: NORMAL H
TRANSFUSION STATUS PATIENT QL: NORMAL
TRANSFUSION STATUS PATIENT QL: NORMAL
WBC # BLD AUTO: 4.2 10E9/L (ref 4–11)
WBC # BLD AUTO: 5 10E9/L (ref 4–11)

## 2017-10-25 PROCEDURE — 00000146 ZZHCL STATISTIC GLUCOSE BY METER IP

## 2017-10-25 PROCEDURE — 83010 ASSAY OF HAPTOGLOBIN QUANT: CPT | Performed by: STUDENT IN AN ORGANIZED HEALTH CARE EDUCATION/TRAINING PROGRAM

## 2017-10-25 PROCEDURE — 25000132 ZZH RX MED GY IP 250 OP 250 PS 637: Performed by: INTERNAL MEDICINE

## 2017-10-25 PROCEDURE — 25000132 ZZH RX MED GY IP 250 OP 250 PS 637: Performed by: STUDENT IN AN ORGANIZED HEALTH CARE EDUCATION/TRAINING PROGRAM

## 2017-10-25 PROCEDURE — 36415 COLL VENOUS BLD VENIPUNCTURE: CPT | Performed by: INTERNAL MEDICINE

## 2017-10-25 PROCEDURE — 82550 ASSAY OF CK (CPK): CPT | Performed by: STUDENT IN AN ORGANIZED HEALTH CARE EDUCATION/TRAINING PROGRAM

## 2017-10-25 PROCEDURE — 36415 COLL VENOUS BLD VENIPUNCTURE: CPT | Performed by: STUDENT IN AN ORGANIZED HEALTH CARE EDUCATION/TRAINING PROGRAM

## 2017-10-25 PROCEDURE — 25000131 ZZH RX MED GY IP 250 OP 636 PS 637: Performed by: INTERNAL MEDICINE

## 2017-10-25 PROCEDURE — 21400006 ZZH R&B CCU INTERMEDIATE UMMC

## 2017-10-25 PROCEDURE — 85025 COMPLETE CBC W/AUTO DIFF WBC: CPT | Performed by: STUDENT IN AN ORGANIZED HEALTH CARE EDUCATION/TRAINING PROGRAM

## 2017-10-25 PROCEDURE — 84132 ASSAY OF SERUM POTASSIUM: CPT | Performed by: STUDENT IN AN ORGANIZED HEALTH CARE EDUCATION/TRAINING PROGRAM

## 2017-10-25 PROCEDURE — 84132 ASSAY OF SERUM POTASSIUM: CPT | Performed by: INTERNAL MEDICINE

## 2017-10-25 PROCEDURE — 84132 ASSAY OF SERUM POTASSIUM: CPT | Performed by: PEDIATRICS

## 2017-10-25 PROCEDURE — 36415 COLL VENOUS BLD VENIPUNCTURE: CPT | Performed by: PEDIATRICS

## 2017-10-25 PROCEDURE — 85045 AUTOMATED RETICULOCYTE COUNT: CPT | Performed by: STUDENT IN AN ORGANIZED HEALTH CARE EDUCATION/TRAINING PROGRAM

## 2017-10-25 PROCEDURE — 83735 ASSAY OF MAGNESIUM: CPT | Performed by: INTERNAL MEDICINE

## 2017-10-25 PROCEDURE — 40000611 ZZHCL STATISTIC MORPHOLOGY W/INTERP HEMEPATH TC 85060: Performed by: STUDENT IN AN ORGANIZED HEALTH CARE EDUCATION/TRAINING PROGRAM

## 2017-10-25 PROCEDURE — 84100 ASSAY OF PHOSPHORUS: CPT | Performed by: INTERNAL MEDICINE

## 2017-10-25 PROCEDURE — 85018 HEMOGLOBIN: CPT | Performed by: INTERNAL MEDICINE

## 2017-10-25 PROCEDURE — 25000131 ZZH RX MED GY IP 250 OP 636 PS 637: Performed by: PEDIATRICS

## 2017-10-25 PROCEDURE — 80048 BASIC METABOLIC PNL TOTAL CA: CPT | Performed by: STUDENT IN AN ORGANIZED HEALTH CARE EDUCATION/TRAINING PROGRAM

## 2017-10-25 PROCEDURE — 25800025 ZZH RX 258: Performed by: INTERNAL MEDICINE

## 2017-10-25 PROCEDURE — 25000128 H RX IP 250 OP 636: Performed by: STUDENT IN AN ORGANIZED HEALTH CARE EDUCATION/TRAINING PROGRAM

## 2017-10-25 PROCEDURE — 85027 COMPLETE CBC AUTOMATED: CPT | Performed by: INTERNAL MEDICINE

## 2017-10-25 PROCEDURE — 80053 COMPREHEN METABOLIC PANEL: CPT | Performed by: INTERNAL MEDICINE

## 2017-10-25 PROCEDURE — 25000131 ZZH RX MED GY IP 250 OP 636 PS 637

## 2017-10-25 PROCEDURE — 85384 FIBRINOGEN ACTIVITY: CPT | Performed by: STUDENT IN AN ORGANIZED HEALTH CARE EDUCATION/TRAINING PROGRAM

## 2017-10-25 PROCEDURE — 83735 ASSAY OF MAGNESIUM: CPT | Performed by: STUDENT IN AN ORGANIZED HEALTH CARE EDUCATION/TRAINING PROGRAM

## 2017-10-25 PROCEDURE — 99233 SBSQ HOSP IP/OBS HIGH 50: CPT | Mod: GC | Performed by: PEDIATRICS

## 2017-10-25 PROCEDURE — 40000141 ZZH STATISTIC PERIPHERAL IV START W/O US GUIDANCE

## 2017-10-25 PROCEDURE — 83615 LACTATE (LD) (LDH) ENZYME: CPT | Performed by: STUDENT IN AN ORGANIZED HEALTH CARE EDUCATION/TRAINING PROGRAM

## 2017-10-25 PROCEDURE — P9016 RBC LEUKOCYTES REDUCED: HCPCS | Performed by: EMERGENCY MEDICINE

## 2017-10-25 PROCEDURE — 80197 ASSAY OF TACROLIMUS: CPT | Performed by: INTERNAL MEDICINE

## 2017-10-25 PROCEDURE — 99211 OFF/OP EST MAY X REQ PHY/QHP: CPT

## 2017-10-25 RX ORDER — MYCOPHENOLIC ACID 360 MG/1
360 TABLET, DELAYED RELEASE ORAL
Status: DISCONTINUED | OUTPATIENT
Start: 2017-10-25 | End: 2017-10-27 | Stop reason: HOSPADM

## 2017-10-25 RX ORDER — DEXTROSE MONOHYDRATE 25 G/50ML
25 INJECTION, SOLUTION INTRAVENOUS ONCE
Status: COMPLETED | OUTPATIENT
Start: 2017-10-25 | End: 2017-10-25

## 2017-10-25 RX ORDER — SODIUM BICARBONATE
POWDER (GRAM) MISCELLANEOUS 3 TIMES DAILY
Status: DISCONTINUED | OUTPATIENT
Start: 2017-10-25 | End: 2017-10-25

## 2017-10-25 RX ORDER — DEXTROSE MONOHYDRATE 25 G/50ML
25 INJECTION, SOLUTION INTRAVENOUS ONCE
Status: DISCONTINUED | OUTPATIENT
Start: 2017-10-25 | End: 2017-10-25

## 2017-10-25 RX ORDER — SODIUM BICARBONATE 650 MG/1
650 TABLET ORAL 3 TIMES DAILY
Status: DISCONTINUED | OUTPATIENT
Start: 2017-10-25 | End: 2017-10-26

## 2017-10-25 RX ORDER — DEXTROSE MONOHYDRATE 100 MG/ML
INJECTION, SOLUTION INTRAVENOUS CONTINUOUS
Status: ACTIVE | OUTPATIENT
Start: 2017-10-25 | End: 2017-10-26

## 2017-10-25 RX ORDER — FUROSEMIDE 20 MG
20 TABLET ORAL 2 TIMES DAILY
Status: DISCONTINUED | OUTPATIENT
Start: 2017-10-25 | End: 2017-10-27 | Stop reason: HOSPADM

## 2017-10-25 RX ORDER — MAGNESIUM SULFATE HEPTAHYDRATE 40 MG/ML
4 INJECTION, SOLUTION INTRAVENOUS EVERY 4 HOURS PRN
Status: DISCONTINUED | OUTPATIENT
Start: 2017-10-25 | End: 2017-10-27 | Stop reason: HOSPADM

## 2017-10-25 RX ORDER — DEXTROSE MONOHYDRATE 100 MG/ML
INJECTION, SOLUTION INTRAVENOUS CONTINUOUS
Status: DISPENSED | OUTPATIENT
Start: 2017-10-25 | End: 2017-10-25

## 2017-10-25 RX ADMIN — GABAPENTIN 300 MG: 300 CAPSULE ORAL at 21:04

## 2017-10-25 RX ADMIN — GABAPENTIN 300 MG: 300 CAPSULE ORAL at 13:19

## 2017-10-25 RX ADMIN — FLUCONAZOLE 200 MG: 200 TABLET ORAL at 08:22

## 2017-10-25 RX ADMIN — SODIUM CHLORIDE 1000 ML: 9 INJECTION, SOLUTION INTRAVENOUS at 15:48

## 2017-10-25 RX ADMIN — Medication 25 G: at 01:55

## 2017-10-25 RX ADMIN — FLUDROCORTISONE ACETATE 0.2 MG: 0.1 TABLET ORAL at 08:22

## 2017-10-25 RX ADMIN — TACROLIMUS 7 MG: 5 CAPSULE ORAL at 10:28

## 2017-10-25 RX ADMIN — FUROSEMIDE 20 MG: 20 TABLET ORAL at 16:42

## 2017-10-25 RX ADMIN — AMLODIPINE BESYLATE 10 MG: 10 TABLET ORAL at 08:21

## 2017-10-25 RX ADMIN — OMEPRAZOLE 20 MG: 20 CAPSULE, DELAYED RELEASE ORAL at 08:22

## 2017-10-25 RX ADMIN — MYCOPHENOLIC ACID 360 MG: 360 TABLET, DELAYED RELEASE ORAL at 22:20

## 2017-10-25 RX ADMIN — FUROSEMIDE 20 MG: 20 TABLET ORAL at 08:22

## 2017-10-25 RX ADMIN — Medication 25 G: at 20:06

## 2017-10-25 RX ADMIN — OXYCODONE HYDROCHLORIDE 5 MG: 5 TABLET ORAL at 14:30

## 2017-10-25 RX ADMIN — SODIUM POLYSTYRENE SULFONATE 30 G: 15 SUSPENSION ORAL; RECTAL at 02:16

## 2017-10-25 RX ADMIN — FUROSEMIDE 20 MG: 10 INJECTION, SOLUTION INTRAVENOUS at 01:10

## 2017-10-25 RX ADMIN — INSULIN GLARGINE 50 UNITS: 100 INJECTION, SOLUTION SUBCUTANEOUS at 08:23

## 2017-10-25 RX ADMIN — OXYCODONE HYDROCHLORIDE 5 MG: 5 TABLET ORAL at 08:18

## 2017-10-25 RX ADMIN — MYCOPHENOLIC ACID 360 MG: 360 TABLET, DELAYED RELEASE ORAL at 08:22

## 2017-10-25 RX ADMIN — TACROLIMUS 7 MG: 5 CAPSULE ORAL at 01:09

## 2017-10-25 RX ADMIN — SODIUM BICARBONATE 650 MG: 650 TABLET ORAL at 08:20

## 2017-10-25 RX ADMIN — MAGNESIUM SULFATE IN WATER 4 G: 40 INJECTION, SOLUTION INTRAVENOUS at 08:12

## 2017-10-25 RX ADMIN — HUMAN INSULIN 8.5 UNITS: 100 INJECTION, SOLUTION SUBCUTANEOUS at 01:57

## 2017-10-25 RX ADMIN — DEXTROSE MONOHYDRATE: 100 INJECTION, SOLUTION INTRAVENOUS at 02:00

## 2017-10-25 RX ADMIN — GABAPENTIN 300 MG: 300 CAPSULE ORAL at 08:20

## 2017-10-25 RX ADMIN — DEXTROSE MONOHYDRATE: 100 INJECTION, SOLUTION INTRAVENOUS at 20:14

## 2017-10-25 RX ADMIN — SODIUM BICARBONATE 650 MG: 650 TABLET ORAL at 21:04

## 2017-10-25 RX ADMIN — OXYCODONE HYDROCHLORIDE 5 MG: 5 TABLET ORAL at 00:38

## 2017-10-25 RX ADMIN — OXYCODONE HYDROCHLORIDE 5 MG: 5 TABLET ORAL at 21:05

## 2017-10-25 RX ADMIN — TACROLIMUS 7 MG: 5 CAPSULE ORAL at 22:20

## 2017-10-25 RX ADMIN — HUMAN INSULIN 8.5 UNITS: 100 INJECTION, SOLUTION SUBCUTANEOUS at 20:09

## 2017-10-25 RX ADMIN — SODIUM BICARBONATE 650 MG: 650 TABLET ORAL at 15:48

## 2017-10-25 RX ADMIN — SODIUM BICARBONATE 650 MG: 650 TABLET ORAL at 13:19

## 2017-10-25 ASSESSMENT — PAIN DESCRIPTION - DESCRIPTORS
DESCRIPTORS: THROBBING;SHARP
DESCRIPTORS: SHARP;ACHING

## 2017-10-25 NOTE — PROGRESS NOTES
[facility], [place]    Internal Medicine Progress Note - [facility] Service    Main Plans for Today   Nephrology consult  GI consult  Continue to monitor for hyperK     Assessment & Plan   Camacho Bhagat is a 51 year old White male with a past medical history significant for ESLD/cryptogenic cirrhosis (CASTAÑEDA), HCC s/p TACE 09/2016, who is s/p liver transplantation in 03/2017, h/o neutropenic colitis/ishcemic bowel s/p colectomy 07/2017 who presents with recurrent hyperkalemia and anemia.  He is hemodynamically stable on admission with no EKG changes or life threatening arrhythmias.          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     - Continuous telemetry monitoring.    - Reshifted on floor o/n. Repeat K of 6 this AM.   - Trend K every 6 hours until resolved and monitor closely for arrhythmias.       Acute on chronic normocytic anemia:   - No clear source for blood loss.   - Hgb this AM 6, recheck 6.7. Patient given 1U of blood  - Hemolysis labs checked: , haptoglobin 120, smear pending  - reticulocyte % 4%  - B12, folate wnl  - appears most likely related to CKD      Cryptogenic cirrhosis/CASTAÑEDA   S/P Liver transplantation in 03/2017  HCC S/P TACE 09/2016:   - His post transplant course was complicated with infection including colitis, CMV viremia/colitis, and abscess/intraperitoneal infections which required an exploratory laparotomy and colectomy in 07/2017.      Immunosuppression:  - Tacrolimus 7 mg BID.  Recheck Tacrolimus level in the AM.     -tacro level 6.3  - Mycophenoleic acid 360 mg BID.       Cellulitis/gangrene:  Patient seen by vascular surgery in clinic yesterday  with debridement performed. Does not appear acutely infected on exam today and no recommendation for further vascular studies  -wound care consult placed     H/O neutropenic colitis s/p ileostomy:    - Endorses increased stool output via the ostomy over the past several weeks. Perhaps related to his Augmentin which was started in clinic last week.   -will obtain stool studies to rule out alternate causes     H/O CMV viremia: (CMV R- D+).  Treated in 07/2017.    - Weekly CMV quant levels followed by ID.        Chronic Medical Conditions:   Type II DM:   Essential HTN: Continue Amlodipine.          Diet: Renal Diet (non-dialysis)  Fluids: oral intake  DVT Prophylaxis: Low Risk/Ambulatory with no VTE prophylaxis indicated  Code Status: Full Code    Disposition Plan   Expected discharge: Tomorrow, recommended to prior living arrangement once resolution of hyperkalemia.     Entered: Sherry Albert 10/25/2017, 7:20 AM   Information in the above section will display in the discharge planner report.      The patient's care was discussed with the Attending Physician, Dr. Torres.    Sherry Albert  [facility]: 1  Pager: [phone]  Please see sticky note for cross cover information    Interval History   Rested well o/n. Noted to have elevated K again and was shifted with insulin, D50, given extra lasix. Took his kayexelate. Reports he feels well this AM, just has a sore back from his hospital bed    Physical Exam   Vital Signs: Temp: 99.1  F (37.3  C) Temp src: Oral BP: (!) 154/95 Pulse: 89 Heart Rate: 92 Resp: 16 SpO2: 100 % O2 Device: None (Room air)    Weight: 186 lbs 3.2 oz  General Appearance: Well appearing male in NAD  Respiratory: CTAB  Cardiovascular: RRR, S1, S2, no murmurs  GI: soft, NT, ND, BS+, ostomy appears c/d/i  Skin: warm and well perfused. Necrotic looking area of the toe that is stable in nature  Other: Alert and oriented to person, place, time. No overt FND         Data    Medications        tacrolimus  7 mg Oral BID IS     amLODIPine  10 mg Oral Daily     fluconazole  200 mg Oral Daily     fludrocortisone  0.2 mg Oral Daily     furosemide  20 mg Oral Daily     gabapentin (NEURONTIN) capsule 300 mg  300 mg Oral TID     insulin glargine  50 Units Subcutaneous QAM     mycophenolic acid  360 mg Oral BID     omeprazole (priLOSEC) CR capsule 20 mg  20 mg Oral QAM     sodium bicarbonate  650 mg Oral TID     Data     Recent Labs  Lab 10/25/17  0907 10/25/17  0733 10/25/17  0200 10/24/17  2131 10/24/17  1942 10/24/17  1427 10/24/17  1426   WBC 4.2 5.0  --   --   --   --  4.5   HGB 6.7* 6.0*  --   --  7.3*  --  5.7*   MCV 91 96  --   --   --   --  92   PLT 93* 97*  --   --   --   --  102*   NA  --  134  --   --  135 133  --    POTASSIUM  --  6.0* 6.2* 6.3* 6.1* 6.9*  --    CHLORIDE  --  108  --   --  108 110*  --    CO2  --  17*  --   --  17* 18*  --    BUN  --  40*  --   --  43* 41*  --    CR  --  1.94*  --   --  1.84* 1.75*  --    ANIONGAP  --  9  --   --  10 6  --    JACOB  --  8.6  --   --  8.3* 8.4*  --    GLC  --  161*  --   --  136* 231*  --    ALBUMIN  --  2.6*  --   --   --  2.6*  --    PROTTOTAL  --  7.3  --   --   --  7.4  --    BILITOTAL  --  1.1  --   --   --  0.4  --    ALKPHOS  --  491*  --   --   --  224*  --    ALT  --  55  --   --   --  23  --    AST  --  160*  --   --   --  32  --      No results found for this or any previous visit (from the past 24 hour(s)).

## 2017-10-25 NOTE — PROGRESS NOTES
S/p hospitalization for CMV colitis and perforation, s/p colectomy, ileostomy, s/p drain placement    Doing ok.  Decreased output from MARICRUZ.  Pain in the finger at the site of necrosis, some discharge from the areas of necrosis around the toes    Af, vsst    A&Ox3  Healing incision  Minimal output in MARICRUZ  Tip of right index finger with gangrenous changes, no surrounding erythema but + pain  Right toes with gangrenous changes and wet wounds concerning for ifx  Good distal pulses    K 6.2, CR 1.97, WBC 6.8, CMV -, Prograf 3.6    A/P: 51 yo gentleman s/p CMV colitis, perforation, ICU/pressors, now recovering.  - graft function - good  - immunosuppression: prograf ~6, MMF  - start Augmentin  - X-rays of the foot and hand, foot is concerning for ifx/osteo  - d/c maricruz  - ASAP f/up with vascular surg  - hyperkalemia - fluid and lasix in [facility]    BABAR Zhang M.D.

## 2017-10-25 NOTE — PROGRESS NOTES
D/I/A/P. Received [facility] consult for abdominal wound and ostomy. Patient has a mucous fistula that he dresses with gauze and an established ileostomy with supplies available. Patient independent with ostomy cares and will change ostomy pouch per his routine schedule. There are no abdominal wounds. 5 minutes face to face with patient. No further WOC f/u planned.

## 2017-10-25 NOTE — PLAN OF CARE
Problem: Patient Care Overview  Goal: Plan of Care/Patient Progress Review  BP (!) 154/95  Pulse 89  Temp 99.1  F (37.3  C) (Oral)  Resp 16  Ht 1.829 m (6')  Wt 84.5 kg (186 lb 3.2 oz)  SpO2 100%  BMI 25.25 kg/m2      Admitted to 6C d/t K+ of 6.9 and Mag of 1.3. Hbg 5.7. VSS on RA. A/Ox4. NSR with rare PAC's and PVC's. PT given Kayexalate, D50%, insulin, and had IV infusion of D10% (See MAR for more details). C/o lower back pain, managed with PRN oxy x1. Pt managing Illeostomy independently, Pt educated to inform staff of output. Extra ostomy bags @ bedside. Pt up ad evette in room. R PIV x2. Voiding adequately. Tolerating renal diet. Blood sugar checks, WNL. AM K+ recheck pending. Will cont to monitor, follow POC, and update team with any changes.

## 2017-10-25 NOTE — CONSULTS
Hepatology Consultation    Camacho Bhagat   MRN# [id number]     Age: 52 year old YOB: 1965       Referring provider: Jesse Torres  Attending Hepatologist: Dr. Gonsalez  Consult requested for: immunosuppression       Assessment and Recommendation:   Assessment:   Mr. 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       Recommendations:   1. Hyperkalemia  2. DDLT 3/4/17 for CASTAÑEDA, HCC  3. Immunosuppression  - continues on florinef at 0.2 mg daily. Slight improvement in potassium level with kayexalate. Nephrology following.   - has been on tacrolimus 7 mg BID and myphortic 360 mg BID with a trough tac level of 6.3. He has been on fluconazole that will help with increasing tac levels.   - since he is planning on having takedown of his ileostomy, healing of prior ulcer on toe and finger, and <1 year post liver transplant, would hold on transitioning to m-tor at this time.   - has had slight increase in liver tests today with alk phos and transaminases. Please perform MRCP. He may need liver transplant while inpatient as he is higher risk for rejection.     Plan of care discussed with Dr. Gonsalez    Thank you for the opportunity to be involved in Camacho Bhagat care. Please call with any questions or concerns.     Abigail Robison, LAMIN, CNP  [phone]               History of Present Illness:   Camacho Bhagat is a 52 year old male with a history of DDLT on 3/4/17 for CASTAÑEDA and HCC. His post operative course has been complicate by bowel perforation, R hemicolectomy (7/2017), CMV viremia, CKD, HTN, DM II, abdominal abscesses s/p drain who was admitted with hyperkalemia. He has been admitted with hyperkalemia previously and advised to eat a low potassium diet which he states he has been compliant. He continues on tacrolimus and cellcept for immunosuppression  with no history of rejection.     Mr. Bhagat has been overall feeling well and denies palpitations, nausea, vomiting, fevers, abdominal pain or fatigue. He continues to have loose stool output with plans for takedown in January. He denies melena or hematochezia.              Past Medical History:     Past Medical History:   Diagnosis Date     Cancer (H)      Depressive disorder, not elsewhere classified      Esophageal reflux      Fibromyalgia 1/2009    dx with Dr Benitez( [facility])     History of thrombophlebitis      Osteoarthritis      Other acute embolism veins 11/01    Deep vein thrombophlebitis, filter placed     Other and unspecified hyperlipidemia      Other chronic nonalcoholic liver disease     Fatty liver      Other testicular hypofunction      Pneumonia, organism unspecified(486) 10-01    Included ARDS, sepsis, and  acute renal failure; hospitalized     Rheumatoid arthritis(714.0)      Type II or unspecified type diabetes mellitus without mention of complication, not stated as uncontrolled 11/01    Managed by endocrinology     Unspecified essential hypertension 11/01    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     Unspecified sleep apnea     Uses BiPAP              Past Surgical History:     Past Surgical History:   Procedure Laterality Date     BENCH LIVER N/A 3/4/2017    Procedure: BENCH LIVER;  Surgeon: Jovan Tran MD;  [facility]      NONSPECIFIC PROCEDURE      tracheostomy     C NONSPECIFIC PROCEDURE      repair of deviated septum     C NONSPECIFIC PROCEDURE  2007    Rt knee arthroscopy     C TOTAL KNEE ARTHROPLASTY  2008    Right knee arthroscopy     CHOLECYSTECTOMY       COLONOSCOPY N/A 7/21/2017    Procedure: COMBINED COLONOSCOPY, SINGLE OR MULTIPLE BIOPSY/POLYPECTOMY BY BIOPSY;  Colonoscopy;  Surgeon: Izaiah Montes MD;  [facility]     ESOPHAGOSCOPY, GASTROSCOPY, DUODENOSCOPY (EGD), COMBINED N/A 8/4/2016    Procedure: COMBINED ESOPHAGOSCOPY, GASTROSCOPY, DUODENOSCOPY  (EGD), BIOPSY SINGLE OR MULTIPLE;  Surgeon: Trent Pederson MD;  Location: [facility]     ESOPHAGOSCOPY, GASTROSCOPY, DUODENOSCOPY (EGD), COMBINED N/A 2017    Procedure: COMBINED ESOPHAGOSCOPY, GASTROSCOPY, DUODENOSCOPY (EGD);;  Surgeon: Los Wynn MD;  Location: [facility]     LAPAROTOMY EXPLORATORY N/A 2017    Procedure: LAPAROTOMY EXPLORATORY;  Exploratory Laparotomy, washout;  Surgeon: Tip Zhang MD;  [facility]     LAPAROTOMY EXPLORATORY N/A 2017    Procedure: LAPAROTOMY EXPLORATORY;  Exploratory Laparotomy, Washout with closure.;  Surgeon: Tpi Zhang MD;  [facility]     LAPAROTOMY EXPLORATORY N/A 2017    Procedure: LAPAROTOMY EXPLORATORY;  Exploratory Laparotomy, Right angelique-colectomy, end ileostomy, mucosal fistula, partial omentectomy;  Surgeon: Sara Dinh MD;  Location: [facility]     TRANSPLANT LIVER RECIPIENT  DONOR N/A 3/4/2017    Procedure: TRANSPLANT LIVER RECIPIENT  DONOR;  Surgeon: Jovan Tran MD;  [facility]: [facility]              Social History:     Social History   Substance Use Topics     Smoking status: Former Smoker     Smokeless tobacco: Former User     Types: Chew     Quit date: 10/8/2015      Comment: Has used chewing tobacco since age 15 , chewed for 20yrs      Alcohol use No      Comment: last drink about a year ago (quit )             Family History:   The family history includes Arthritis in his mother; CANCER in his mother; DIABETES in his father; Hypertension in his father; Other Cancer in his mother; Substance Abuse in his father; Thyroid Disease in his mother. There is no history of Colon Cancer, Hyperlipidemia, Coronary Artery Disease, CEREBROVASCULAR DISEASE, Breast Cancer, or Prostate Cancer.             Immunizations:            Allergies:     Allergies   Allergen Reactions     Erythromycin GI Disturbance     Vioxx      Nausea, vomiting             Medications:   @  Prescriptions Prior to  Admission   Medication Sig Dispense Refill Last Dose     mycophenolic acid (GENERIC EQUIVALENT) 360 MG EC tablet Take 1 tablet (360 mg) by mouth every 12 hours 60 tablet 5 10/24/2017 at 1130     fluconazole (DIFLUCAN) 200 MG tablet Take 1 tablet (200 mg) by mouth daily 30 tablet 5 10/24/2017 at AM     amoxicillin-clavulanate (AUGMENTIN) 500-125 MG per tablet Take 1 tablet by mouth 3 times daily 30 tablet 0 10/24/2017 at AM     OMEPRAZOLE PO Take 20 mg by mouth every morning    10/24/2017 at AM     GABAPENTIN PO Take 300 mg by mouth 3 times daily   10/24/2017 at AM     tacrolimus (GENERIC EQUIVALENT) 5 MG capsule Take 1 capsule (5 mg) by mouth every 12 hours *take with 2 mg capsules for total dose 7 mg every 12 hours 60 capsule 11 10/24/2017 at 1130     tacrolimus (GENERIC EQUIVALENT) 1 MG capsule Take 2 capsules (2 mg) by mouth every 12 hours *take with 5 mg capsules for total of 7 mg every 12 hours 60 capsule 11 10/24/2017 at 1130     insulin glargine (LANTUS) 100 UNIT/ML injection Inject 50 Units Subcutaneous every morning   10/24/2017 at AM     linagliptin (TRADJENTA) 5 MG TABS tablet Take 5 mg by mouth daily   10/24/2017 at AM     furosemide (LASIX) 20 MG tablet Take 1 tablet (20 mg) by mouth daily 30 tablet 3 10/24/2017 at AM     fludrocortisone (FLORINEF) 0.1 MG tablet Take 2 tablets (0.2 mg) by mouth daily For elevated potassium 60 tablet 3 10/24/2017 at AM     loperamide (IMODIUM) 2 MG capsule Take 2 capsules (4 mg) by mouth 2 times daily 120 capsule 3 10/24/2017 at AM     amLODIPine (NORVASC) 5 MG tablet Take 2 tablets (10 mg) by mouth daily 60 tablet 3 10/24/2017 at AM     cyclobenzaprine (FLEXERIL) 10 MG tablet Take 1 tablet (10 mg) by mouth 3 times daily as needed for muscle spasms 90 tablet 0 Past Week at Unknown time     sodium bicarbonate 650 MG tablet Take 1 tablet (650 mg) by mouth 3 times daily 90 tablet 5 10/24/2017 at AM     sodium chloride, PF, 0.9% PF flush Flush 12 french drain with 10mls NS  BID, flush 24 Fr drain with 20mls NS  mL 3 Taking     oxyCODONE (ROXICODONE) 10 MG IR tablet Take 0.5 tablets (5 mg) by mouth every 4 hours as needed for moderate to severe pain 30 tablet 0 More than a month at Unknown time     acetaminophen (TYLENOL) 325 MG tablet Take 2 tablets (650 mg) by mouth every 4 hours as needed for mild pain 100 tablet 3 Never Taken   @          Review of Systems:    ROS: 10 point ROS neg other than the symptoms noted above in the HPI.          Physical Exam:   Blood pressure 144/82, pulse 74, temperature 98.1  F (36.7  C), temperature source Oral, resp. rate 16, height 1.829 m (6'), weight 84.5 kg (186 lb 3.2 oz), SpO2 100 %. Body mass index is 25.25 kg/(m^2).    Intake/Output Summary (Last 24 hours) at 10/25/17 1415  Last data filed at 10/25/17 1224   Gross per 24 hour   Intake             1060 ml   Output             3725 ml   Net            -2665 ml     General: In no acute distress, no facial muscle wasting  Neuro: AOx3, No asterixis  HEENT: PERRL No scleral icterus, No oral lesions  Lymph:  No cervical lymphadenoapthy  CV:  Skin warm and dry.   Lungs: Respirations even and nonlabored on room air  Abd: Nondistended, nontender. Mid abdomen ostomy.   Extrem: No peripehral edema  Skin: No jaundice. Large, right large toe ulcer without erythema. Right 1st digit ulceration.   Psych: Pleasant, appropriate         Data:     Lab Results   Component Value Date    WBC 4.2 10/25/2017     Lab Results   Component Value Date    RBC 2.21 10/25/2017     Lab Results   Component Value Date    HGB 6.7 10/25/2017     Lab Results   Component Value Date    HCT 20.0 10/25/2017     No components found for: MCT  Lab Results   Component Value Date    MCV 91 10/25/2017     Lab Results   Component Value Date    MCH 30.3 10/25/2017     Lab Results   Component Value Date    MCHC 33.5 10/25/2017     Lab Results   Component Value Date    RDW 15.1 10/25/2017     Lab Results   Component Value Date    PLT 93  10/25/2017       Last Basic Metabolic Panel:  Lab Results   Component Value Date     10/25/2017      Lab Results   Component Value Date    POTASSIUM 6.0 10/25/2017     Lab Results   Component Value Date    CHLORIDE 108 10/25/2017     Lab Results   Component Value Date    JACOB 8.6 10/25/2017     Lab Results   Component Value Date    CO2 17 10/25/2017     Lab Results   Component Value Date    BUN 40 10/25/2017     Lab Results   Component Value Date    CR 1.94 10/25/2017     Lab Results   Component Value Date     10/25/2017       Liver Function Studies -   Recent Labs   Lab Test  10/25/17   0733   PROTTOTAL  7.3   ALBUMIN  2.6*   BILITOTAL  1.1   ALKPHOS  491*   AST  160*   ALT  55       Lab Results   Component Value Date    INR 1.12 08/24/2017       MELD-Na score: 7 at 8/26/2017  6:24 AM  MELD score: 7 at 8/26/2017  6:24 AM  Calculated from:  Serum Creatinine: 0.92 mg/dL (Rounded to 1) at 8/26/2017  6:24 AM  Serum Sodium: 140 mmol/L (Rounded to 137) at 8/26/2017  6:24 AM  Total Bilirubin: 0.4 mg/dL (Rounded to 1) at 8/24/2017  6:36 AM  INR(ratio): 1.12 at 8/24/2017  2:06 PM  Age: 51 years    IMAGING:  US liver transplant 10/16/17  Impression:   1.  Unremarkable Doppler assessment of the liver transplant.  2.  Splenomegaly measuring 23.1 cm, previously 22.2 cm.  3.  Right pleural effusion and small bilateral gutter ascites.

## 2017-10-25 NOTE — PLAN OF CARE
Problem: Patient Care Overview  Goal: Plan of Care/Patient Progress Review  Outcome: No Change  /82  Pulse 74  Temp 98.1  F (36.7  C) (Oral)  Resp 16  Ht 1.829 m (6')  Wt 84.5 kg (186 lb 3.2 oz)  SpO2 100%  BMI 25.25 kg/m2     A & O, VSS. RA. Hgb 6.0 at 7am at 9am 6.7 Got 1 unit of RBC without any s/s of reactions. Gave Oxy x2 for low back pain. K+6.0. MD's aware.

## 2017-10-25 NOTE — CONSULTS
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  He is currently admitted with EJ that could be related to the increasing tacro doses since he has been not at goal  Recurrent hyperkalemia with EJ and hypoaldosteronism (RTA IV from tacro) PTA , he may also have tubular injury and proximal tubular injury as well with the colostomy increased drainage also contributory his bicarb has been low    Will recommend  - Please give 1L NSS saline over the next 4 hrs  - increase lasix to 20mg BID  - follow BMP Q12  - monitor IO and daily weights  - please follow on CK added to am labs  - Peripheral smear is pending ( 10/20 has normocytic anemia) good to ro TMA since he has been on tacro  - He may be a candidate for Patiromir if the K doesnot respond , will need to talk to transplant before considering that since it may bind the IS meds too.   - He may also need to be considered for a non CNI therapy with mTOR per transplant surgery    Hyperkalemia  Persistent  Will try IVF with increased dose of lasix  On florinef for theresa replacements  Monitor Q12hr labs  Hypomagnesemia likely sec to tacrolimus as well     Euvolemic with HTN  controlled blood pressures with amlodipine    Anemia  LDH elevated -- can be an inflammatory marker Vs sign of hemolysis  Peripheral smear pending  Retics index is 0.4 and he has hypo-proliferation response as well  "which may be sec to BM suppression in addition to peripheral destruction since platelets are also decreased. He was recently treated for CMV in 7/2017 and has had typhlitis as well.  - can consider hematology consult   Transfuse for <7    OLT 3/2017 for CASTAÑEDA and cryptogenic cirrhosis, HCC SP TACE 09/2016  bl infections recurrent  On tacro MMF  tacro goals per notes are ~6 (level at 6.3 today)    DMII management per primary    Recommendations were communicated to primary team via phone    Seen and discussed with Dr. Subhash Barraza MD   [phone]      REASON FOR CONSULT: Sam and Anemia    HISTORY OF PRESENT ILLNESS:  Camacho Bhagat is a 52 year old male with a PMH of  ESLD/cryptogenic cirrhosis (CASTAÑEDA), HCC s/p TACE 09/2016, who is s/p liver transplantation in 03/2017, h/o neutropenic colitis/ishcemic bowel s/p colectomy 07/2017 who presents with recurrent hyperkalemia and anemia.  He was recently evaluated in the nephrology clinic as well. Has recurrent elevated potassium since the tacroi dose has been increasing due to levels not being on target. Creatinine at bl is <1 and GFR 80s , he has been asymptomatic with these fluctuations. UO has been adequate and he deines any weight gain.   He reports increased output from the colostomy in the last few days, wo ant fevers chills and abdominal pain.  He has been compliant with bicarb pills and once daily 20mg lasix at home. Although he is concerned that he may develop renal failure \"like his dad \" from increased diuresis.       PAST MEDICAL HISTORY:  Reviewed with patient on 10/25/2017     Past Medical History:   Diagnosis Date     Cancer (H)      Depressive disorder, not elsewhere classified      Esophageal reflux      Fibromyalgia 1/2009    dx with Dr Benitez( [facility])     History of thrombophlebitis      Osteoarthritis      Other acute embolism veins 11/01    Deep vein thrombophlebitis, filter placed     Other and unspecified hyperlipidemia      Other chronic " nonalcoholic liver disease     Fatty liver      Other testicular hypofunction      Pneumonia, organism [phone]    Included ARDS, sepsis, and  acute renal failure; hospitalized     Rheumatoid arthritis(714.0)      Type II or unspecified type diabetes mellitus without mention of complication, not stated as uncontrolled 11/01    Managed by endocrinology     Unspecified essential hypertension 11/01    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     Unspecified sleep apnea     Uses BiPAP       Past Surgical History:   Procedure Laterality Date     BENCH LIVER N/A 3/4/2017    Procedure: BENCH LIVER;  Surgeon: Jovan Tran MD;  Location: [facility] OR     C NONSPECIFIC PROCEDURE      tracheostomy     C NONSPECIFIC PROCEDURE      repair of deviated septum     C NONSPECIFIC PROCEDURE  2007    Rt knee arthroscopy     C TOTAL KNEE ARTHROPLASTY  2008    Right knee arthroscopy     CHOLECYSTECTOMY       COLONOSCOPY N/A 7/21/2017    Procedure: COMBINED COLONOSCOPY, SINGLE OR MULTIPLE BIOPSY/POLYPECTOMY BY BIOPSY;  Colonoscopy;  Surgeon: Izaiah Montes MD;  Location:  GI     ESOPHAGOSCOPY, GASTROSCOPY, DUODENOSCOPY (EGD), COMBINED N/A 8/4/2016    Procedure: COMBINED ESOPHAGOSCOPY, GASTROSCOPY, DUODENOSCOPY (EGD), BIOPSY SINGLE OR MULTIPLE;  Surgeon: Trent Pederson MD;  Location:  GI     ESOPHAGOSCOPY, GASTROSCOPY, DUODENOSCOPY (EGD), COMBINED N/A 8/27/2017    Procedure: COMBINED ESOPHAGOSCOPY, GASTROSCOPY, DUODENOSCOPY (EGD);;  Surgeon: Los Wynn MD;  Location: [facility] GI     LAPAROTOMY EXPLORATORY N/A 7/4/2017    Procedure: LAPAROTOMY EXPLORATORY;  Exploratory Laparotomy, washout;  Surgeon: Tip Zhang MD;  Location: [facility]     LAPAROTOMY EXPLORATORY N/A 7/5/2017    Procedure: LAPAROTOMY EXPLORATORY;  Exploratory Laparotomy, Washout with closure.;  Surgeon: Tip Zhang MD;  Location: [facility]     LAPAROTOMY EXPLORATORY N/A 7/26/2017    Procedure: LAPAROTOMY EXPLORATORY;   Exploratory Laparotomy, Right angelique-colectomy, end ileostomy, mucosal fistula, partial omentectomy;  Surgeon: Sara Dinh MD;  Location: [facility] OR     TRANSPLANT LIVER RECIPIENT  DONOR N/A 3/4/2017    Procedure: TRANSPLANT LIVER RECIPIENT  DONOR;  Surgeon: Jovan Tran MD;  Location: [facility] OR        MEDICATIONS:  PTA Meds  Prior to Admission medications    Medication Sig Last Dose Taking? Auth Provider   mycophenolic acid (GENERIC EQUIVALENT) 360 MG EC tablet Take 1 tablet (360 mg) by mouth every 12 hours 10/24/2017 at 1130 Yes Jovan Tran MD   fluconazole (DIFLUCAN) 200 MG tablet Take 1 tablet (200 mg) by mouth daily 10/24/2017 at AM Yes Jovan Tran MD   amoxicillin-clavulanate (AUGMENTIN) 500-125 MG per tablet Take 1 tablet by mouth 3 times daily 10/24/2017 at AM Yes Tip Zhang MD   OMEPRAZOLE PO Take 20 mg by mouth every morning  10/24/2017 at AM Yes Reported, Patient   GABAPENTIN PO Take 300 mg by mouth 3 times daily 10/24/2017 at AM Yes Reported, Patient   tacrolimus (GENERIC EQUIVALENT) 5 MG capsule Take 1 capsule (5 mg) by mouth every 12 hours *take with 2 mg capsules for total dose 7 mg every 12 hours 10/24/2017 at 1130 Yes Jovan Tran MD   tacrolimus (GENERIC EQUIVALENT) 1 MG capsule Take 2 capsules (2 mg) by mouth every 12 hours *take with 5 mg capsules for total of 7 mg every 12 hours 10/24/2017 at 1130 Yes Jovan Tran MD   insulin glargine (LANTUS) 100 UNIT/ML injection Inject 50 Units Subcutaneous every morning 10/24/2017 at AM Yes Reported, Patient   linagliptin (TRADJENTA) 5 MG TABS tablet Take 5 mg by mouth daily 10/24/2017 at AM Yes Reported, Patient   furosemide (LASIX) 20 MG tablet Take 1 tablet (20 mg) by mouth daily 10/24/2017 at AM Yes Jovan Tran MD   fludrocortisone (FLORINEF) 0.1 MG tablet Take 2 tablets (0.2 mg) by mouth daily For elevated potassium 10/24/2017 at AM Yes Jovan Tran MD    loperamide (IMODIUM) 2 MG capsule Take 2 capsules (4 mg) by mouth 2 times daily 10/24/2017 at AM Yes Felicia Tolliver APRN CNP   amLODIPine (NORVASC) 5 MG tablet Take 2 tablets (10 mg) by mouth daily 10/24/2017 at AM Yes Felicia Tolliver APRN CNP   cyclobenzaprine (FLEXERIL) 10 MG tablet Take 1 tablet (10 mg) by mouth 3 times daily as needed for muscle spasms Past Week at Unknown time Yes Felicia Tolliver APRN CNP   sodium bicarbonate 650 MG tablet Take 1 tablet (650 mg) by mouth 3 times daily 10/24/2017 at AM Yes Felicia Tolliver APRN CNP   sodium chloride, PF, 0.9% PF flush Flush 12 french drain with 10mls NS BID, flush 24 Fr drain with 20mls NS BID   Jovan Tran MD   oxyCODONE (ROXICODONE) 10 MG IR tablet Take 0.5 tablets (5 mg) by mouth every 4 hours as needed for moderate to severe pain More than a month at Unknown time  Jovan Tran MD   acetaminophen (TYLENOL) 325 MG tablet Take 2 tablets (650 mg) by mouth every 4 hours as needed for mild pain Never Taken  Felicia Tolliver APRN CNP      Current Meds    tacrolimus  7 mg Oral BID IS     amLODIPine  10 mg Oral Daily     fluconazole  200 mg Oral Daily     fludrocortisone  0.2 mg Oral Daily     furosemide  20 mg Oral Daily     gabapentin (NEURONTIN) capsule 300 mg  300 mg Oral TID     insulin glargine  50 Units Subcutaneous QAM     mycophenolic acid  360 mg Oral BID     omeprazole (priLOSEC) CR capsule 20 mg  20 mg Oral QAM     sodium bicarbonate  650 mg Oral TID     Infusion Meds       ALLERGIES:    Allergies   Allergen Reactions     Erythromycin GI Disturbance     Vioxx      Nausea, vomiting       REVIEW OF SYSTEMS:  A comprehensive of systems was negative except as noted above.    SOCIAL HISTORY:   Social History     Social History     Marital status:      Spouse name: N/A     Number of children: 1     Years of education: N/A     Occupational History            Social History Main Topics     Smoking status:  Former Smoker     Smokeless tobacco: Former User     Types: Chew     Quit date: 10/8/2015      Comment: Has used chewing tobacco since age 15 , chewed for 20yrs      Alcohol use No      Comment: last drink about a year ago (quit )     Drug use: No     Sexual activity: Yes     Partners: Female     Other Topics Concern     Not on file     Social History Narrative    uSED TO BE      Back in school now         Reviewed with patient       FAMILY MEDICAL HISTORY:   Family History   Problem Relation Age of Onset     DIABETES Father      Hypertension Father      Substance Abuse Father      Arthritis Mother      CANCER Mother      Thyroid     Thyroid Disease Mother      Other Cancer Mother      Colon Cancer No family hx of      Hyperlipidemia No family hx of      Coronary Artery Disease No family hx of      CEREBROVASCULAR DISEASE No family hx of      Breast Cancer No family hx of      Prostate Cancer No family hx of      Reviewed with patient     PHYSICAL EXAM:   Temp  Av.4  F (36.9  C)  Min: 97.6  F (36.4  C)  Max: 99.9  F (37.7  C)      Pulse  Av.8  Min: 72  Max: 96 Resp  Av.4  Min: 12  Max: 23  SpO2  Av.1 %  Min: 95 %  Max: 100 %       /82  Pulse 74  Temp 98.1  F (36.7  C) (Oral)  Resp 16  Ht 1.829 m (6')  Wt 84.5 kg (186 lb 3.2 oz)  SpO2 100%  BMI 25.25 kg/m2   Date 10/25/17 0700 - 10/26/17 0659   Shift 4797-5201 4516-0652 5287-1354 24 Hour Total   I  N  T  A  K  E   P.O. 360   360    I.V. 100   100    Blood Components 300   300    Shift Total  (mL/kg) 760  (9)   760  (9)   O  U  T  P  U  T   Urine 475   475    Stool 750   750    Shift Total  (mL/kg) 1225  (14.5)   1225  (14.5)   Weight (kg) 84.46 84.46 84.46 84.46        Admit Weight: 85.5 kg (188 lb 7.9 oz)     GENERAL APPEARANCE: no acute  distress,  awake  EYES: - scleral icterus, pupils equal  HENT: NC/AT,  mouth  without ulcers or lesions  Lymphatics: no cervical or supraclavicular LAD  Endo: no moon  facies, no goiter  Pulmonary: lungs clear to auscultation with equal breath sounds bilaterally, no clubbing  CV: regular rhythm, normal rate, no rub   - JVP -   - Edema-  GI: soft, nontender, normal bowel sounds, no HSM   MS: no evidence of inflammation in joints, no muscle tenderness  : - vazquez  SKIN: no rash, warm, dry, no cyanosis  NEURO: face symmetric, - asterixis     LABS:   CMP  Recent Labs  Lab 10/25/17  0733 10/25/17  0200 10/24/17  2131 10/24/17  1942 10/24/17  1427 10/24/17  1135  10/20/17  1127     --   --  135 133 135  < > 135   POTASSIUM 6.0* 6.2* 6.3* 6.1* 6.9* 7.2*  < > 6.9*   CHLORIDE 108  --   --  108 110* 110*  < > 110*   CO2 17*  --   --  17* 18* 18*  < > 18*   ANIONGAP 9  --   --  10 6 7  < > 7   *  --   --  136* 231* 221*  < > 221*   BUN 40*  --   --  43* 41* 41*  < > 46*   CR 1.94*  --   --  1.84* 1.75* 1.82*  < > 1.76*   GFRESTIMATED 36*  --   --  39* 41* 39*  < > 41*   GFRESTBLACK 44*  --   --  47* 50* 47*  < > 49*   JACOB 8.6  --   --  8.3* 8.4* 8.3*  < > 8.7   MAG 1.3* 1.1*  --  1.3* 1.3* 1.4*  < > 1.6   PHOS 4.8*  --   --   --  3.8 3.9  --  4.0   PROTTOTAL 7.3  --   --   --  7.4 7.8  --  8.0   ALBUMIN 2.6*  --   --   --  2.6* 2.7*  --  2.8*   BILITOTAL 1.1  --   --   --  0.4 0.5  --  0.4   ALKPHOS 491*  --   --   --  224* 252*  --  283*   *  --   --   --  32 34  --  37   ALT 55  --   --   --  23 25  --  32   < > = values in this interval not displayed.  CBC  Recent Labs  Lab 10/25/17  0907 10/25/17  0733 10/24/17  1942 10/24/17  1426 10/24/17  1135   HGB 6.7* 6.0* 7.3* 5.7* 6.8*   WBC 4.2 5.0  --  4.5 4.5   RBC 2.21* 2.01*  --  1.89* 2.20*   HCT 20.0* 19.3*  --  17.4* 20.8*   MCV 91 96  --  92 95   MCH 30.3 29.9  --  30.2 30.9   MCHC 33.5 31.1*  --  32.8 32.7   RDW 15.1* 15.6*  --  15.0 14.8   PLT 93* 97*  --  102* 126*     INRNo lab results found in last 7 days.  ABG  Recent Labs  Lab 10/20/17  1827 10/20/17  1620   O2PER Room Air Room Air      URINE STUDIES  Recent  Labs   Lab Test  09/14/17   1136  08/10/17   1752  08/08/17   1600  08/05/17   0617   COLOR  Yellow  Yellow  Yellow  Yellow   APPEARANCE  Slightly Cloudy  Slightly Cloudy  Slightly Cloudy  Slightly Cloudy   URINEGLC  50*  Negative  Negative  Negative   URINEBILI  Negative  Small   This is an unconfirmed screening test result. A positive result may be false.  *  Negative  Negative   URINEKETONE  Negative  Negative  Negative  Negative   SG  1.012  1.012  1.010  1.018   UBLD  Negative  Negative  Negative  Negative   URINEPH  5.0  5.0  5.0  5.0   PROTEIN  100*  30*  30*  30*   NITRITE  Negative  Negative  Negative  Negative   LEUKEST  Negative  Negative  Negative  Negative   RBCU  3*  0  3*  0   WBCU  2  10*  7*  5*     Recent Labs   Lab Test  09/14/17   1136  06/14/17   1508  04/11/16   1345  02/08/16   1234  04/11/11   1201   UTPG  1.37*  1.55*  0.41*  0.33*  <0.08     PTH  Recent Labs   Lab Test  09/14/17   1133   PTHI  34     IRON STUDIES  Recent Labs   Lab Test  09/14/17   1133  09/07/17   1610  02/08/16   1144   IRON  39  28*  93   FEB  182*  138*  230*   IRONSAT  22  20  41   NELY  2947*   --    --        IMAGING:  All imaging studies reviewed by me.     Balaji Barraza MD

## 2017-10-26 ENCOUNTER — APPOINTMENT (OUTPATIENT)
Dept: MRI IMAGING | Facility: CLINIC | Age: 52
End: 2017-10-26
Attending: INTERNAL MEDICINE
Payer: COMMERCIAL

## 2017-10-26 LAB
ALBUMIN SERPL-MCNC: 2.4 G/DL (ref 3.4–5)
ALP SERPL-CCNC: 454 U/L (ref 40–150)
ALT SERPL W P-5'-P-CCNC: 60 U/L (ref 0–70)
ANION GAP SERPL CALCULATED.3IONS-SCNC: 7 MMOL/L (ref 3–14)
ANION GAP SERPL CALCULATED.3IONS-SCNC: 7 MMOL/L (ref 3–14)
ANION GAP SERPL CALCULATED.3IONS-SCNC: 8 MMOL/L (ref 3–14)
AST SERPL W P-5'-P-CCNC: 82 U/L (ref 0–45)
BILIRUB DIRECT SERPL-MCNC: 0.2 MG/DL (ref 0–0.2)
BILIRUB SERPL-MCNC: 0.5 MG/DL (ref 0.2–1.3)
BUN SERPL-MCNC: 40 MG/DL (ref 7–30)
BUN SERPL-MCNC: 43 MG/DL (ref 7–30)
BUN SERPL-MCNC: 44 MG/DL (ref 7–30)
CALCIUM SERPL-MCNC: 8 MG/DL (ref 8.5–10.1)
CALCIUM SERPL-MCNC: 8.1 MG/DL (ref 8.5–10.1)
CALCIUM SERPL-MCNC: 8.2 MG/DL (ref 8.5–10.1)
CHLORIDE SERPL-SCNC: 108 MMOL/L (ref 94–109)
CO2 SERPL-SCNC: 17 MMOL/L (ref 20–32)
CO2 SERPL-SCNC: 18 MMOL/L (ref 20–32)
CO2 SERPL-SCNC: 18 MMOL/L (ref 20–32)
CREAT SERPL-MCNC: 1.98 MG/DL (ref 0.66–1.25)
CREAT SERPL-MCNC: 2 MG/DL (ref 0.66–1.25)
CREAT SERPL-MCNC: 2.11 MG/DL (ref 0.66–1.25)
ERYTHROCYTE [DISTWIDTH] IN BLOOD BY AUTOMATED COUNT: 15.4 % (ref 10–15)
GFR SERPL CREATININE-BSD FRML MDRD: 33 ML/MIN/1.7M2
GFR SERPL CREATININE-BSD FRML MDRD: 35 ML/MIN/1.7M2
GFR SERPL CREATININE-BSD FRML MDRD: 36 ML/MIN/1.7M2
GLUCOSE BLDC GLUCOMTR-MCNC: 100 MG/DL (ref 70–99)
GLUCOSE BLDC GLUCOMTR-MCNC: 102 MG/DL (ref 70–99)
GLUCOSE BLDC GLUCOMTR-MCNC: 106 MG/DL (ref 70–99)
GLUCOSE BLDC GLUCOMTR-MCNC: 58 MG/DL (ref 70–99)
GLUCOSE BLDC GLUCOMTR-MCNC: 65 MG/DL (ref 70–99)
GLUCOSE BLDC GLUCOMTR-MCNC: 71 MG/DL (ref 70–99)
GLUCOSE BLDC GLUCOMTR-MCNC: 78 MG/DL (ref 70–99)
GLUCOSE BLDC GLUCOMTR-MCNC: 82 MG/DL (ref 70–99)
GLUCOSE BLDC GLUCOMTR-MCNC: 82 MG/DL (ref 70–99)
GLUCOSE BLDC GLUCOMTR-MCNC: 83 MG/DL (ref 70–99)
GLUCOSE BLDC GLUCOMTR-MCNC: 84 MG/DL (ref 70–99)
GLUCOSE BLDC GLUCOMTR-MCNC: 87 MG/DL (ref 70–99)
GLUCOSE BLDC GLUCOMTR-MCNC: 90 MG/DL (ref 70–99)
GLUCOSE BLDC GLUCOMTR-MCNC: 92 MG/DL (ref 70–99)
GLUCOSE BLDC GLUCOMTR-MCNC: 99 MG/DL (ref 70–99)
GLUCOSE SERPL-MCNC: 100 MG/DL (ref 70–99)
GLUCOSE SERPL-MCNC: 128 MG/DL (ref 70–99)
GLUCOSE SERPL-MCNC: 168 MG/DL (ref 70–99)
HCT VFR BLD AUTO: 21.2 % (ref 40–53)
HCT VFR BLD AUTO: 21.6 % (ref 40–53)
HGB BLD-MCNC: 7 G/DL (ref 13.3–17.7)
HGB BLD-MCNC: 7.2 G/DL (ref 13.3–17.7)
MCH RBC QN AUTO: 30.3 PG (ref 26.5–33)
MCHC RBC AUTO-ENTMCNC: 33.3 G/DL (ref 31.5–36.5)
MCV RBC AUTO: 91 FL (ref 78–100)
PLATELET # BLD AUTO: 88 10E9/L (ref 150–450)
POTASSIUM SERPL-SCNC: 5.8 MMOL/L (ref 3.4–5.3)
POTASSIUM SERPL-SCNC: 5.8 MMOL/L (ref 3.4–5.3)
POTASSIUM SERPL-SCNC: 6.3 MMOL/L (ref 3.4–5.3)
POTASSIUM SERPL-SCNC: 6.4 MMOL/L (ref 3.4–5.3)
PROT SERPL-MCNC: 6.8 G/DL (ref 6.8–8.8)
RBC # BLD AUTO: 2.38 10E12/L (ref 4.4–5.9)
SODIUM SERPL-SCNC: 132 MMOL/L (ref 133–144)
SODIUM SERPL-SCNC: 133 MMOL/L (ref 133–144)
SODIUM SERPL-SCNC: 134 MMOL/L (ref 133–144)
TACROLIMUS BLD-MCNC: 10.3 UG/L (ref 5–15)
TME LAST DOSE: NORMAL H
WBC # BLD AUTO: 4.1 10E9/L (ref 4–11)

## 2017-10-26 PROCEDURE — 36415 COLL VENOUS BLD VENIPUNCTURE: CPT | Performed by: PEDIATRICS

## 2017-10-26 PROCEDURE — 25000132 ZZH RX MED GY IP 250 OP 250 PS 637: Performed by: STUDENT IN AN ORGANIZED HEALTH CARE EDUCATION/TRAINING PROGRAM

## 2017-10-26 PROCEDURE — 25000128 H RX IP 250 OP 636: Performed by: INTERNAL MEDICINE

## 2017-10-26 PROCEDURE — 85027 COMPLETE CBC AUTOMATED: CPT | Performed by: INTERNAL MEDICINE

## 2017-10-26 PROCEDURE — 21400006 ZZH R&B CCU INTERMEDIATE UMMC

## 2017-10-26 PROCEDURE — 74181 MRI ABDOMEN W/O CONTRAST: CPT

## 2017-10-26 PROCEDURE — 25000132 ZZH RX MED GY IP 250 OP 250 PS 637: Performed by: INTERNAL MEDICINE

## 2017-10-26 PROCEDURE — 25000128 H RX IP 250 OP 636: Performed by: STUDENT IN AN ORGANIZED HEALTH CARE EDUCATION/TRAINING PROGRAM

## 2017-10-26 PROCEDURE — 36415 COLL VENOUS BLD VENIPUNCTURE: CPT | Performed by: STUDENT IN AN ORGANIZED HEALTH CARE EDUCATION/TRAINING PROGRAM

## 2017-10-26 PROCEDURE — 80197 ASSAY OF TACROLIMUS: CPT | Performed by: PEDIATRICS

## 2017-10-26 PROCEDURE — 00000146 ZZHCL STATISTIC GLUCOSE BY METER IP

## 2017-10-26 PROCEDURE — 25000132 ZZH RX MED GY IP 250 OP 250 PS 637

## 2017-10-26 PROCEDURE — 80048 BASIC METABOLIC PNL TOTAL CA: CPT | Performed by: STUDENT IN AN ORGANIZED HEALTH CARE EDUCATION/TRAINING PROGRAM

## 2017-10-26 PROCEDURE — 25000131 ZZH RX MED GY IP 250 OP 636 PS 637

## 2017-10-26 PROCEDURE — 85018 HEMOGLOBIN: CPT | Performed by: STUDENT IN AN ORGANIZED HEALTH CARE EDUCATION/TRAINING PROGRAM

## 2017-10-26 PROCEDURE — 25000131 ZZH RX MED GY IP 250 OP 636 PS 637: Performed by: PEDIATRICS

## 2017-10-26 PROCEDURE — 80076 HEPATIC FUNCTION PANEL: CPT | Performed by: STUDENT IN AN ORGANIZED HEALTH CARE EDUCATION/TRAINING PROGRAM

## 2017-10-26 PROCEDURE — 25800025 ZZH RX 258: Performed by: STUDENT IN AN ORGANIZED HEALTH CARE EDUCATION/TRAINING PROGRAM

## 2017-10-26 PROCEDURE — 99233 SBSQ HOSP IP/OBS HIGH 50: CPT | Mod: GC | Performed by: PEDIATRICS

## 2017-10-26 PROCEDURE — 85014 HEMATOCRIT: CPT | Performed by: STUDENT IN AN ORGANIZED HEALTH CARE EDUCATION/TRAINING PROGRAM

## 2017-10-26 PROCEDURE — 84132 ASSAY OF SERUM POTASSIUM: CPT | Performed by: STUDENT IN AN ORGANIZED HEALTH CARE EDUCATION/TRAINING PROGRAM

## 2017-10-26 PROCEDURE — 25800025 ZZH RX 258

## 2017-10-26 RX ORDER — FUROSEMIDE 10 MG/ML
40 INJECTION INTRAMUSCULAR; INTRAVENOUS ONCE
Status: COMPLETED | OUTPATIENT
Start: 2017-10-26 | End: 2017-10-26

## 2017-10-26 RX ORDER — DEXTROSE MONOHYDRATE 100 MG/ML
INJECTION, SOLUTION INTRAVENOUS CONTINUOUS
Status: ACTIVE | OUTPATIENT
Start: 2017-10-26 | End: 2017-10-26

## 2017-10-26 RX ORDER — ONDANSETRON 2 MG/ML
4 INJECTION INTRAMUSCULAR; INTRAVENOUS EVERY 6 HOURS PRN
Status: DISCONTINUED | OUTPATIENT
Start: 2017-10-26 | End: 2017-10-27 | Stop reason: HOSPADM

## 2017-10-26 RX ORDER — SODIUM BICARBONATE 650 MG/1
1300 TABLET ORAL 3 TIMES DAILY
Status: DISCONTINUED | OUTPATIENT
Start: 2017-10-26 | End: 2017-10-27 | Stop reason: HOSPADM

## 2017-10-26 RX ORDER — DEXTROSE MONOHYDRATE 25 G/50ML
25 INJECTION, SOLUTION INTRAVENOUS ONCE
Status: COMPLETED | OUTPATIENT
Start: 2017-10-26 | End: 2017-10-26

## 2017-10-26 RX ORDER — FUROSEMIDE 10 MG/ML
20 INJECTION INTRAMUSCULAR; INTRAVENOUS ONCE
Status: DISCONTINUED | OUTPATIENT
Start: 2017-10-26 | End: 2017-10-26

## 2017-10-26 RX ADMIN — SODIUM CHLORIDE 900 ML: 9 INJECTION, SOLUTION INTRAVENOUS at 19:36

## 2017-10-26 RX ADMIN — OXYCODONE HYDROCHLORIDE 5 MG: 5 TABLET ORAL at 21:44

## 2017-10-26 RX ADMIN — OXYCODONE HYDROCHLORIDE 5 MG: 5 TABLET ORAL at 10:43

## 2017-10-26 RX ADMIN — Medication 25 G: at 06:32

## 2017-10-26 RX ADMIN — OMEPRAZOLE 20 MG: 20 CAPSULE, DELAYED RELEASE ORAL at 08:40

## 2017-10-26 RX ADMIN — TACROLIMUS 7 MG: 5 CAPSULE ORAL at 10:43

## 2017-10-26 RX ADMIN — SODIUM BICARBONATE 650 MG TABLET 1300 MG: at 14:28

## 2017-10-26 RX ADMIN — MYCOPHENOLIC ACID 360 MG: 360 TABLET, DELAYED RELEASE ORAL at 21:37

## 2017-10-26 RX ADMIN — OXYCODONE HYDROCHLORIDE 5 MG: 5 TABLET ORAL at 17:32

## 2017-10-26 RX ADMIN — GABAPENTIN 300 MG: 300 CAPSULE ORAL at 14:28

## 2017-10-26 RX ADMIN — FLUCONAZOLE 200 MG: 200 TABLET ORAL at 08:40

## 2017-10-26 RX ADMIN — HUMAN INSULIN 8.5 UNITS: 100 INJECTION, SOLUTION SUBCUTANEOUS at 06:42

## 2017-10-26 RX ADMIN — DEXTROSE 15 G: 15 GEL ORAL at 02:34

## 2017-10-26 RX ADMIN — Medication 25 ML: at 07:58

## 2017-10-26 RX ADMIN — DEXTROSE MONOHYDRATE: 100 INJECTION, SOLUTION INTRAVENOUS at 06:45

## 2017-10-26 RX ADMIN — FUROSEMIDE 20 MG: 20 TABLET ORAL at 16:26

## 2017-10-26 RX ADMIN — ONDANSETRON 4 MG: 2 INJECTION INTRAMUSCULAR; INTRAVENOUS at 09:42

## 2017-10-26 RX ADMIN — FUROSEMIDE 40 MG: 10 INJECTION, SOLUTION INTRAVENOUS at 18:53

## 2017-10-26 RX ADMIN — FLUDROCORTISONE ACETATE 0.2 MG: 0.1 TABLET ORAL at 08:40

## 2017-10-26 RX ADMIN — FUROSEMIDE 40 MG: 10 INJECTION, SOLUTION INTRAVENOUS at 06:03

## 2017-10-26 RX ADMIN — SODIUM CHLORIDE 100 ML: 9 INJECTION, SOLUTION INTRAVENOUS at 19:03

## 2017-10-26 RX ADMIN — GABAPENTIN 300 MG: 300 CAPSULE ORAL at 19:42

## 2017-10-26 RX ADMIN — TACROLIMUS 7 MG: 5 CAPSULE ORAL at 21:37

## 2017-10-26 RX ADMIN — GABAPENTIN 300 MG: 300 CAPSULE ORAL at 08:40

## 2017-10-26 RX ADMIN — SODIUM CHLORIDE 1000 ML: 9 INJECTION, SOLUTION INTRAVENOUS at 09:47

## 2017-10-26 RX ADMIN — AMLODIPINE BESYLATE 10 MG: 10 TABLET ORAL at 08:40

## 2017-10-26 RX ADMIN — Medication 25 ML: at 03:26

## 2017-10-26 RX ADMIN — OXYCODONE HYDROCHLORIDE 5 MG: 5 TABLET ORAL at 03:57

## 2017-10-26 RX ADMIN — CALCIUM GLUCONATE 2 G: 94 INJECTION, SOLUTION INTRAVENOUS at 08:30

## 2017-10-26 RX ADMIN — MYCOPHENOLIC ACID 360 MG: 360 TABLET, DELAYED RELEASE ORAL at 10:43

## 2017-10-26 RX ADMIN — FUROSEMIDE 20 MG: 20 TABLET ORAL at 08:40

## 2017-10-26 RX ADMIN — SODIUM BICARBONATE 650 MG: 650 TABLET ORAL at 08:40

## 2017-10-26 RX ADMIN — SODIUM BICARBONATE 650 MG TABLET 1300 MG: at 19:42

## 2017-10-26 ASSESSMENT — PAIN DESCRIPTION - DESCRIPTORS
DESCRIPTORS: SHARP;THROBBING
DESCRIPTORS: SHOOTING;THROBBING
DESCRIPTORS: SHOOTING

## 2017-10-26 NOTE — PLAN OF CARE
Problem: Patient Care Overview  Goal: Plan of Care/Patient Progress Review  Outcome: No Change  D: Admitted 10/24 due to Hyperkalemia, Anemia, and EJ. Hx liver transplant 3/17.      I:K+6.2 Dextrose and Insulin given per protocol at 2000. Recheck K+ 6.0 (team aware, no interventions-next recheck with morning labs).  NPO at midnight. Pt's blood glucose 58 at 0230- 15g of glucose gel given, along with 240mL of apple juice. Recheck of blood glucose 71(240mL apple juice given) additional recheck 78-Dextrose given to get patient above 100. Oxyx2. Ileostomy bag changed. 0600-Lasix, dextrose, and insulin given per orders due to K+ 6.4. Pt expressed frustration with the lack of communication between care teams. Paged [facility] team to come speak with the patient but pages were not returned, day nurse will follow up.      A:A/Ox4. Pain-Back, repositioned and hot packs, PRN meds. Room air. Lungs-Diminished in bases. SR 70s-80s with rare PVC/PAC. Active bowel sounds, good output via ileostomy. Up independently in room. PIVx2. Right greater toe and second toe black on tips. Adequate urine output.   Temp:  [97.7  F (36.5  C)-98.2  F (36.8  C)] 98.2  F (36.8  C)  Pulse:  [72-74] 74  Heart Rate:  [72-87] 76  Resp:  [16] 16  BP: (138-149)/(80-94) 143/87  SpO2:  [96 %-100 %] 96 %     P:  Monitor blood glucose closely. Next K check at 0900. Abdominal MRI today.Tacro level will need to be drawn at 1000 due to patient taking tacro at 2200. Continue to monitor and assess with report of questions/concerns to the [facility] team.      Marjorie Wiley RN 10/26/17 5:11 AM     Hours of Care [phone]

## 2017-10-26 NOTE — PROGRESS NOTES
Critical lab value K+ 6.4. Paged Hilary Alejandra, Genaro Ledbetter, orders for lasix, Dextrose 50% and insulin placed.       Marjorie Wiley RN 10/26/17 5:54 AM

## 2017-10-26 NOTE — PROGRESS NOTES
Nephrology Progress Note  10/26/2017         Assessment & Recommendations:      52 YOM with a PMH of  ESLD/cryptogenic cirrhosis (CASTAÑEDA), HCC s/p TACE 09/2016, who is s/p liver transplantation in 03/2017, h/o neutropenic colitis/ishcemic bowel s/p colectomy 07/2017 who presents with recurrent hyperkalemia and anemia     EJ with CKD II  Creatinine has been steady in 1.8-1.9 range over last 2 months, with UO appropriate for the intake  bl creatinine <1 and GFR 80s before the 8/2017   He may have had a decline in renal functions since the neutropenic ischemic colitis episode and EBV viremia treatment, also his tacrolimus has been up titrated to archive a therapeutic goal that has gone parallel with his renal decline.    At this time the increased creatinine and worsened renal functions are likely related to CNI toxicity    Recurrent hyperkalemia with EJ and hypoaldosteronism (RTA IV from tacro) PTA , he may also have tubular injury and proximal tubular injury as well with the colostomy increased drainage also contributory his bicarb has been low     Will recommend  - Repeat give 1L NSS saline over the next 4 hrs  - keep lasix to 20mg BID  - follow BMP Q12  - monitor IO and daily weights  - Peripheral smear has no evidence of MAHA  - He may be a candidate for Patiromir , please review with transplant surgery , and if ok start at 8.4g/day atleast 4-6hrs apart from the IS meds  - He may also need to be considered for a non CNI therapy with mTOR per transplant surgery when that is a possibility     Hyperkalemia  Persistent with mild improvement  Will try IVF with lasix  On florinef for theresa replacements  Monitor Q12hr labs  Hypomagnesemia likely sec to tacrolimus as well      Euvolemic with HTN  controlled blood pressures with amlodipine     Anemia  LDH elevated -- can be an inflammatory marker Vs sign of hemolysis although peripheral smear -ve for MAHA  Retics index is 0.4 and he has hypo-proliferation response as well  which may be sec to BM suppression in addition to peripheral destruction since platelets are also decreased. He was recently treated for EBV in 7/2017 and has had typhlitis as well.  Transfuse for <7     OLT 3/2017 for CASTAÑEDA and cryptogenic cirrhosis, HCC SP TACE 09/2016  cb infections recurrent  On tacro MMF  tacro goals per notes are ~6 (level at 6.3 today)     DMII management per primary     Recommendations were communicated to primary team via phone     Seen and discussed with Dr. Subhash Barraza MD   [phone]    Interval History :   In the last 24 hours Camacho Bhagat has been stable and asymptomatic eager to go home, potassium has been as high as 6.4 and 5.8 last checked , lasix dose repeated in the morning.    Review of Systems:   I reviewed the following systems:  GI: + appetite. - nausea or vomiting or diarrhea.   Neuro:  - confusion  Constitutional:  - fever or chills  CV: - dyspnea or edema.  - chest pain.    Physical Exam:   I/O last 3 completed shifts:  In: 1600 [P.O.:1200; I.V.:100]  Out: 3350 [Urine:1875; Stool:1475]   /83 (BP Location: Left arm)  Pulse 74  Temp 98.3  F (36.8  C) (Oral)  Resp 16  Ht 1.829 m (6')  Wt 84.5 kg (186 lb 3.2 oz)  SpO2 100%  BMI 25.25 kg/m2     GENERAL APPEARANCE: no distress  EYES:  - scleral icterus, pupils equal  HENT: mouth without ulcers or lesions  PULM: lungs clear to auscultation,  bilaterally, equal air movement, no clubbing  CV: regular rhythm, normal rate, no rub     -JVP -     -edema -   GI: soft, non tender, nondistended  INTEGUMENT: no cyanosis, - rash  NEURO:  - asterixis       Labs:   All labs reviewed by me  Electrolytes/Renal -   Recent Labs   Lab Test  10/26/17   0835  10/26/17   0446  10/25/17   2220   10/25/17   1801  10/25/17   0733  10/25/17   0200   10/24/17   1427  10/24/17   1135   NA   --   133   --    --   132*  134   --    < >  133  135   POTASSIUM  5.8*  6.4*  6.0*   < >  6.2*  6.0*  6.2*   < >  6.9*  7.2*   CHLORIDE   --    108   --    --   106  108   --    < >  110*  110*   CO2   --   18*   --    --   17*  17*   --    < >  18*  18*   BUN   --   40*   --    --   40*  40*   --    < >  41*  41*   CR   --   1.98*   --    --   1.82*  1.94*   --    < >  1.75*  1.82*   GLC   --   100*   --    --   135*  161*   --    < >  231*  221*   JACOB   --   8.1*   --    --   8.3*  8.6   --    < >  8.4*  8.3*   MAG   --    --    --    --   1.9  1.3*  1.1*   < >  1.3*  1.4*   PHOS   --    --    --    --    --   4.8*   --    --   3.8  3.9    < > = values in this interval not displayed.       CBC -   Recent Labs   Lab Test  10/26/17   0446  10/25/17   1801  10/25/17   0907  10/25/17   0733   WBC  4.1   --   4.2  5.0   HGB  7.2*  7.8*  6.7*  6.0*   PLT  88*   --   93*  97*       LFTs -   Recent Labs   Lab Test  10/26/17   0446  10/25/17   0733  10/24/17   1427   ALKPHOS  454*  491*  224*   BILITOTAL  0.5  1.1  0.4   ALT  60  55  23   AST  82*  160*  32   PROTTOTAL  6.8  7.3  7.4   ALBUMIN  2.4*  2.6*  2.6*       Iron Panel -   Recent Labs   Lab Test  09/14/17   1133  09/07/17   1610  02/08/16   1144   IRON  39  28*  93   IRONSAT  22  20  41   NELY  2947*   --    --          Imaging:  All imaging studies reviewed by me.     Current Medications:    sodium chloride 0.9%  1,000 mL Intravenous Once     sodium bicarbonate  1,300 mg Oral TID     tacrolimus  7 mg Oral BID IS     furosemide  20 mg Oral BID     mycophenolic acid  360 mg Oral 2 times per day     amLODIPine  10 mg Oral Daily     fluconazole  200 mg Oral Daily     fludrocortisone  0.2 mg Oral Daily     gabapentin (NEURONTIN) capsule 300 mg  300 mg Oral TID     insulin glargine  50 Units Subcutaneous QAM     omeprazole (priLOSEC) CR capsule 20 mg  20 mg Oral NICOLE Barraza MD

## 2017-10-26 NOTE — PLAN OF CARE
Problem: Patient Care Overview  Goal: Plan of Care/Patient Progress Review  D: patient with history of liver txp 03/2017 here due to EJ, hyperkalemia and anemia.  I: 1000 cc NS bolus started, PO lasix given. MRI checklist completed, sent to MRI.  A: A/Ox4, pleasant and cooperative. SR, RA, reports chronic back pain. LS clear on L, dim throughout right.   P: abdominal MRI tonight. Continue to follow lytes, I/O. Notify team of changes.concerns.

## 2017-10-26 NOTE — PHARMACY-CONSULT NOTE
Patient is being treated for hyperkalemia. A pharmacy consult was initiated to review the patient's medication list for possible causes of hyperkalemia.  The following medications for this patient may cause or exacerbate hyperkalemia: tacrolimus     Continue current medication regimen.     Juanita Sampson, PharmD, BCPS

## 2017-10-26 NOTE — PROGRESS NOTES
Post Transplant Patient Social Work Assessment     Patient Name: Camacho Bhagat  : 1965  Age: 52 year old  MRN: [id number]  Date of transplant: 3/4/2017    Patient known to me from follow up in the liver transplant program.  Admitted on 10/24/17 for hyperkalemia with EJ.  Seen today to update assessment.      Presenting Information   Living Situation: He is currently staying at his grandmother's one-level home in [place], [place]. His wife and daughter have been providing any needed caregiver support. Her daughter lives in and is caring for his home in [place].   Functional Status:  reports being able to manage most of his own cares, including his ostomy. He has been driving.  Cultural/Language/Spiritual Considerations: None identified.     Support System  Primary Support Person His wife Danica   Other support:  Daughter Maribell  Plan for support in immediate post-hospital period: He plans to return home with family assistance.     Health Care Directive  Decision Maker: Patient  Alternate Decision Maker: Spouse  Health Care Directive: Patient considering completing. He has forms at home but will ask this writer to replace them if needed.     Mental Health/Coping:   History of Mental Health: Mr. Bhagat reported some depression many years ago.   History of Chemical Health: No substance use issues.   Current status: No concerns.   Coping: Considering his lengthy hospital stay a few months ago, he is coping well. He reported plans to have his ostomy taken down in January, and is looking forward to it.   Services Needed/Recommended: None at this time.     Financial   Income: From his previous employment as an . He expressed interest today in applying for SSDI benefits.   Impact of transplant on income: Inability to work and earn.   Insurance and medication coverage: He has [employer] insurance, which covers his medications.   Financial concerns: None reported.   Resources needed: SSDI    Discharge  Plan   Patient and family discharge goal: Home  Barriers to discharge: Medical status    Education provided by SW: Social Security Disability. Both verbal and written information was provided.     Assessment and recommendations and plan:  Mr. Bhagat is looking forward to going home soon. He reported his potassium problems as starting with his intestinal infection/surgery. He reports being pleased with how his new liver is functioning and stated that his diabetes management has been good. He will likely be able to return home with family support. He reported being very proud of his newly engaged 25 year old daughter, who is very supportive of him.   I remain available for psychosocial support and/or intervention.     JOSEY Palomino (pager [phone])

## 2017-10-26 NOTE — PLAN OF CARE
Problem: Patient Care Overview  Goal: Plan of Care/Patient Progress Review  Outcome: No Change  Pt A/O X 4. Afebrile. VSS on room air.  Back pain controlled with oxycodone. 1 episode of nausea with emesis of undigested food- zofran given with relief. Voids without difficulty. Ileostomy w/ good output. Mucous fistula dressing changed. Pt up independently. BG monitored closely- (). K 5.8.  Will continue to monitor and follow POC.

## 2017-10-26 NOTE — PROVIDER NOTIFICATION
K = 6.2, Estephania Santiago with timi [employer] cover notified. Possible plans to shift with insulin/D50 this evening, awaiting further orders.

## 2017-10-26 NOTE — PROGRESS NOTES
[facility] [place]    Internal Medicine Progress Note - [facility] Service    Main Plans for Today    - Continue treatment for hyperkalemia  - Will discuss with nephrology to start patiromer  - MRI pending.      Assessment & Plan   Camacho Bhagat is a 51 year old White male with a past medical history significant for ESLD/cryptogenic cirrhosis (CASTAÑEDA), HCC s/p TACE 09/2016, who is s/p liver transplantation in 03/2017, h/o neutropenic colitis/ischemic bowel s/p colectomy 07/2017 who presents with recurrent hyperkalemia and anemia.        Hyperkalemia (Recurrent):   Hypomagnesemia  Chronic kidney disease stage III:   - He is followed by nephrology as an outpatient - CKD thought to be related to recurrent EJ and his immunosuppressive medications  - Nephrology with suspicion of recurrent hyperkalemia issues related to tacrolimus.       - Continuous telemetry monitoring.    - Lasix and IV fluids repeated 10/26.  Will start binding resin as advised by the nephrology team.    - Trend K.   - Nephrology and Hepatology to discuss other long term immunosuppression options given hyperkalemia issues.       Acute on chronic normocytic anemia:   - No clear source for blood loss.   - Hemolysis labs checked: , haptoglobin 120, smear pending  - reticulocyte % 4%  - B12, folate wnl  - appears most likely related to CKD      Cryptogenic cirrhosis/CASTAÑEDA   S/P Liver transplantation in 03/2017  HCC S/P TACE 09/2016:   - His post transplant course was complicated with infection including colitis, CMV viremia/colitis, and abscess/intraperitoneal infections which required an exploratory laparotomy and colectomy in 07/2017.      Immunosuppression:  - Tacrolimus 7 mg BID.  Recheck Tacrolimus level in the AM per hepatology.       -tacro level 6.3  - Mycophenoleic acid 360 mg BID.       Cellulitis/gangrene:  Patient seen by vascular surgery in clinic yesterday with debridement performed. Does not appear acutely  infected on exam today and no recommendation for further vascular studies  -wound care consult placed     H/O neutropenic colitis s/p ileostomy:    - Endorses increased stool output via the ostomy over the past several weeks. Perhaps related to his Augmentin which was started in clinic last week.   - Enteric panel and stool/ova parasite exam negative.       H/O CMV viremia: (CMV R- D+).  Treated in 07/2017.    - Weekly CMV quant 10/25 - not detected.        Chronic Medical Conditions:   Type II DM: Glargine.  SSI, hypoglycemia protocol.   Essential HTN: Continue Amlodipine.      Diet: Consistent Carbohydrate Diet 2845-7838 Calories: High Consistent CHO (4-7 CHO units/meal)  Fluids: oral intake  DVT Prophylaxis: Low Risk/Ambulatory with no VTE prophylaxis indicated  Code Status: Full Code    Disposition Plan   Expected discharge: 2 - 3 days, recommended to prior living arrangement once Hyperkalemia management, evaluation for elevated LFTs..     Entered: Yoan Martin 10/26/2017, 10:35 AM   Information in the above section will display in the discharge planner report.      The patient's care was discussed with the Attending Physician, Dr. Torres.    Yoan Martin  [facility]  Maroon: 1  Pager: [phone]  Please see sticky note for cross cover information    Interval History      Noted to have elevated K again and was shifted with insulin, D50, given extra lasix. Reports he feels well this AM, but frustrated with the lack of communication and feels the issues needs to be addressed more efficiently.  No other discomfort. Awaiting MRI this AM.      Physical Exam   Vital Signs: Temp: 98.2  F (36.8  C) Temp src: Oral BP: 143/87 Pulse: 74 Heart Rate: 76 Resp: 16 SpO2: 96 % O2 Device: None (Room air)    Weight: 186 lbs 3.2 oz  General Appearance: Well appearing male in NAD  Respiratory: CTAB  Cardiovascular: RRR, S1, S2, no murmurs  GI: soft, NT, ND, BS+, ostomy appears c/d/i  Skin: warm and well  perfused. Necrotic looking area of the toe that is stable in nature  Other: Alert and oriented to person, place, time. No overt FND     Data   Medications     dextrose 75 mL/hr at 10/26/17 0645       sodium chloride 0.9%  1,000 mL Intravenous Once     tacrolimus  7 mg Oral BID IS     sodium bicarbonate  650 mg Oral TID     furosemide  20 mg Oral BID     mycophenolic acid  360 mg Oral 2 times per day     amLODIPine  10 mg Oral Daily     fluconazole  200 mg Oral Daily     fludrocortisone  0.2 mg Oral Daily     gabapentin (NEURONTIN) capsule 300 mg  300 mg Oral TID     insulin glargine  50 Units Subcutaneous QAM     omeprazole (priLOSEC) CR capsule 20 mg  20 mg Oral QAM     Data     Recent Labs  Lab 10/26/17  0835 10/26/17  0446 10/25/17  2220  10/25/17  1801 10/25/17  0907 10/25/17  0733   WBC  --  4.1  --   --   --  4.2 5.0   HGB  --  7.2*  --   --  7.8* 6.7* 6.0*   MCV  --  91  --   --   --  91 96   PLT  --  88*  --   --   --  93* 97*   NA  --  133  --   --  132*  --  134   POTASSIUM 5.8* 6.4* 6.0*  < > 6.2*  --  6.0*   CHLORIDE  --  108  --   --  106  --  108   CO2  --  18*  --   --  17*  --  17*   BUN  --  40*  --   --  40*  --  40*   CR  --  1.98*  --   --  1.82*  --  1.94*   ANIONGAP  --  8  --   --  9  --  9   JACOB  --  8.1*  --   --  8.3*  --  8.6   GLC  --  100*  --   --  135*  --  161*   ALBUMIN  --  2.4*  --   --   --   --  2.6*   PROTTOTAL  --  6.8  --   --   --   --  7.3   BILITOTAL  --  0.5  --   --   --   --  1.1   ALKPHOS  --  454*  --   --   --   --  491*   ALT  --  60  --   --   --   --  55   AST  --  82*  --   --   --   --  160*   < > = values in this interval not displayed.  No results found for this or any previous visit (from the past 24 hour(s)).

## 2017-10-27 ENCOUNTER — DOCUMENTATION ONLY (OUTPATIENT)
Dept: PHARMACY | Facility: CLINIC | Age: 52
End: 2017-10-27

## 2017-10-27 VITALS
WEIGHT: 185.7 LBS | TEMPERATURE: 97.9 F | HEART RATE: 74 BPM | HEIGHT: 72 IN | RESPIRATION RATE: 16 BRPM | OXYGEN SATURATION: 99 % | BODY MASS INDEX: 25.15 KG/M2 | DIASTOLIC BLOOD PRESSURE: 80 MMHG | SYSTOLIC BLOOD PRESSURE: 146 MMHG

## 2017-10-27 LAB
ABO + RH BLD: NORMAL
ABO + RH BLD: NORMAL
ALBUMIN SERPL-MCNC: 2.4 G/DL (ref 3.4–5)
ALBUMIN SERPL-MCNC: 2.4 G/DL (ref 3.4–5)
ALBUMIN UR-MCNC: 10 MG/DL
ALP SERPL-CCNC: 365 U/L (ref 40–150)
ALT SERPL W P-5'-P-CCNC: 40 U/L (ref 0–70)
ANION GAP SERPL CALCULATED.3IONS-SCNC: 7 MMOL/L (ref 3–14)
ANION GAP SERPL CALCULATED.3IONS-SCNC: 8 MMOL/L (ref 3–14)
APPEARANCE UR: ABNORMAL
AST SERPL W P-5'-P-CCNC: 44 U/L (ref 0–45)
BILIRUB DIRECT SERPL-MCNC: 0.2 MG/DL (ref 0–0.2)
BILIRUB SERPL-MCNC: 0.5 MG/DL (ref 0.2–1.3)
BILIRUB UR QL STRIP: NEGATIVE
BLD GP AB SCN SERPL QL: NORMAL
BLD PROD TYP BPU: NORMAL
BLD PROD TYP BPU: NORMAL
BLD UNIT ID BPU: 0
BLOOD BANK CMNT PATIENT-IMP: NORMAL
BLOOD PRODUCT CODE: NORMAL
BPU ID: NORMAL
BUN SERPL-MCNC: 44 MG/DL (ref 7–30)
BUN SERPL-MCNC: 44 MG/DL (ref 7–30)
CALCIUM SERPL-MCNC: 8 MG/DL (ref 8.5–10.1)
CALCIUM SERPL-MCNC: 8 MG/DL (ref 8.5–10.1)
CHLORIDE SERPL-SCNC: 109 MMOL/L (ref 94–109)
CHLORIDE SERPL-SCNC: 111 MMOL/L (ref 94–109)
CO2 SERPL-SCNC: 17 MMOL/L (ref 20–32)
CO2 SERPL-SCNC: 17 MMOL/L (ref 20–32)
COLOR UR AUTO: YELLOW
CREAT SERPL-MCNC: 2.11 MG/DL (ref 0.66–1.25)
CREAT SERPL-MCNC: 2.13 MG/DL (ref 0.66–1.25)
CREAT UR-MCNC: 92 MG/DL
ERYTHROCYTE [DISTWIDTH] IN BLOOD BY AUTOMATED COUNT: 15.4 % (ref 10–15)
GFR SERPL CREATININE-BSD FRML MDRD: 33 ML/MIN/1.7M2
GFR SERPL CREATININE-BSD FRML MDRD: 33 ML/MIN/1.7M2
GLUCOSE BLDC GLUCOMTR-MCNC: 121 MG/DL (ref 70–99)
GLUCOSE SERPL-MCNC: 135 MG/DL (ref 70–99)
GLUCOSE SERPL-MCNC: 92 MG/DL (ref 70–99)
GLUCOSE UR STRIP-MCNC: NEGATIVE MG/DL
HCT VFR BLD AUTO: 21.1 % (ref 40–53)
HGB BLD-MCNC: 6.9 G/DL (ref 13.3–17.7)
HGB UR QL STRIP: NEGATIVE
HYALINE CASTS #/AREA URNS LPF: 1 /LPF (ref 0–2)
KETONES UR STRIP-MCNC: NEGATIVE MG/DL
LEUKOCYTE ESTERASE UR QL STRIP: NEGATIVE
MCH RBC QN AUTO: 29.5 PG (ref 26.5–33)
MCHC RBC AUTO-ENTMCNC: 32.7 G/DL (ref 31.5–36.5)
MCV RBC AUTO: 90 FL (ref 78–100)
MICROALBUMIN UR-MCNC: 122 MG/L
MICROALBUMIN/CREAT UR: 132.46 MG/G CR (ref 0–17)
MUCOUS THREADS #/AREA URNS LPF: PRESENT /LPF
NITRATE UR QL: NEGATIVE
NUM BPU REQUESTED: 3
PH UR STRIP: 5 PH (ref 5–7)
PHOSPHATE SERPL-MCNC: 5 MG/DL (ref 2.5–4.5)
PLATELET # BLD AUTO: 89 10E9/L (ref 150–450)
POTASSIUM SERPL-SCNC: 6 MMOL/L (ref 3.4–5.3)
POTASSIUM SERPL-SCNC: 6 MMOL/L (ref 3.4–5.3)
PROT SERPL-MCNC: 6.6 G/DL (ref 6.8–8.8)
PROT UR-MCNC: 0.43 G/L
PROT/CREAT 24H UR: 0.46 G/G CR (ref 0–0.2)
RBC # BLD AUTO: 2.34 10E12/L (ref 4.4–5.9)
RBC #/AREA URNS AUTO: 2 /HPF (ref 0–2)
SODIUM SERPL-SCNC: 134 MMOL/L (ref 133–144)
SODIUM SERPL-SCNC: 135 MMOL/L (ref 133–144)
SOURCE: ABNORMAL
SP GR UR STRIP: 1.01 (ref 1–1.03)
SPECIMEN EXP DATE BLD: NORMAL
SQUAMOUS #/AREA URNS AUTO: 1 /HPF (ref 0–1)
TRANSFUSION STATUS PATIENT QL: NORMAL
TRANSFUSION STATUS PATIENT QL: NORMAL
UROBILINOGEN UR STRIP-MCNC: NORMAL MG/DL (ref 0–2)
WBC # BLD AUTO: 3.9 10E9/L (ref 4–11)
WBC #/AREA URNS AUTO: 6 /HPF (ref 0–2)

## 2017-10-27 PROCEDURE — 82043 UR ALBUMIN QUANTITATIVE: CPT | Performed by: INTERNAL MEDICINE

## 2017-10-27 PROCEDURE — 80076 HEPATIC FUNCTION PANEL: CPT | Performed by: STUDENT IN AN ORGANIZED HEALTH CARE EDUCATION/TRAINING PROGRAM

## 2017-10-27 PROCEDURE — 00000146 ZZHCL STATISTIC GLUCOSE BY METER IP

## 2017-10-27 PROCEDURE — 25000132 ZZH RX MED GY IP 250 OP 250 PS 637: Performed by: INTERNAL MEDICINE

## 2017-10-27 PROCEDURE — 36415 COLL VENOUS BLD VENIPUNCTURE: CPT | Performed by: STUDENT IN AN ORGANIZED HEALTH CARE EDUCATION/TRAINING PROGRAM

## 2017-10-27 PROCEDURE — 25000132 ZZH RX MED GY IP 250 OP 250 PS 637: Performed by: STUDENT IN AN ORGANIZED HEALTH CARE EDUCATION/TRAINING PROGRAM

## 2017-10-27 PROCEDURE — P9016 RBC LEUKOCYTES REDUCED: HCPCS | Performed by: EMERGENCY MEDICINE

## 2017-10-27 PROCEDURE — 87799 DETECT AGENT NOS DNA QUANT: CPT | Performed by: INTERNAL MEDICINE

## 2017-10-27 PROCEDURE — 25000131 ZZH RX MED GY IP 250 OP 636 PS 637

## 2017-10-27 PROCEDURE — 25000128 H RX IP 250 OP 636: Performed by: INTERNAL MEDICINE

## 2017-10-27 PROCEDURE — 99239 HOSP IP/OBS DSCHRG MGMT >30: CPT | Mod: GC | Performed by: PEDIATRICS

## 2017-10-27 PROCEDURE — 84156 ASSAY OF PROTEIN URINE: CPT | Performed by: INTERNAL MEDICINE

## 2017-10-27 PROCEDURE — 80048 BASIC METABOLIC PNL TOTAL CA: CPT | Performed by: STUDENT IN AN ORGANIZED HEALTH CARE EDUCATION/TRAINING PROGRAM

## 2017-10-27 PROCEDURE — 36415 COLL VENOUS BLD VENIPUNCTURE: CPT | Performed by: INTERNAL MEDICINE

## 2017-10-27 PROCEDURE — 80069 RENAL FUNCTION PANEL: CPT | Performed by: INTERNAL MEDICINE

## 2017-10-27 PROCEDURE — 85027 COMPLETE CBC AUTOMATED: CPT | Performed by: INTERNAL MEDICINE

## 2017-10-27 PROCEDURE — 81001 URINALYSIS AUTO W/SCOPE: CPT | Performed by: INTERNAL MEDICINE

## 2017-10-27 RX ORDER — SODIUM CHLORIDE 9 MG/ML
INJECTION, SOLUTION INTRAVENOUS CONTINUOUS
Status: ACTIVE | OUTPATIENT
Start: 2017-10-27 | End: 2017-10-27

## 2017-10-27 RX ORDER — FUROSEMIDE 20 MG
20 TABLET ORAL DAILY
Status: CANCELLED
Start: 2017-10-27

## 2017-10-27 RX ORDER — FLUDROCORTISONE ACETATE 0.1 MG/1
0.3 TABLET ORAL DAILY
Status: DISCONTINUED | OUTPATIENT
Start: 2017-10-28 | End: 2017-10-27 | Stop reason: HOSPADM

## 2017-10-27 RX ORDER — FLUDROCORTISONE ACETATE 0.1 MG/1
0.3 TABLET ORAL DAILY
Qty: 90 TABLET | Refills: 3 | Status: SHIPPED | OUTPATIENT
Start: 2017-10-28 | End: 2017-11-08

## 2017-10-27 RX ORDER — FUROSEMIDE 20 MG
20 TABLET ORAL 2 TIMES DAILY
Qty: 60 TABLET | Refills: 3 | Status: SHIPPED | OUTPATIENT
Start: 2017-10-27 | End: 2017-11-01

## 2017-10-27 RX ORDER — SODIUM BICARBONATE 650 MG/1
1300 TABLET ORAL 3 TIMES DAILY
Qty: 180 TABLET | Refills: 3 | Status: SHIPPED | OUTPATIENT
Start: 2017-10-27 | End: 2017-12-20

## 2017-10-27 RX ADMIN — FUROSEMIDE 20 MG: 20 TABLET ORAL at 16:45

## 2017-10-27 RX ADMIN — GABAPENTIN 300 MG: 300 CAPSULE ORAL at 08:06

## 2017-10-27 RX ADMIN — SODIUM CHLORIDE: 9 INJECTION, SOLUTION INTRAVENOUS at 12:19

## 2017-10-27 RX ADMIN — OXYCODONE HYDROCHLORIDE 5 MG: 5 TABLET ORAL at 16:45

## 2017-10-27 RX ADMIN — GABAPENTIN 300 MG: 300 CAPSULE ORAL at 13:51

## 2017-10-27 RX ADMIN — INSULIN GLARGINE 50 UNITS: 100 INJECTION, SOLUTION SUBCUTANEOUS at 08:06

## 2017-10-27 RX ADMIN — AMLODIPINE BESYLATE 10 MG: 10 TABLET ORAL at 08:06

## 2017-10-27 RX ADMIN — OMEPRAZOLE 20 MG: 20 CAPSULE, DELAYED RELEASE ORAL at 08:06

## 2017-10-27 RX ADMIN — OXYCODONE HYDROCHLORIDE 5 MG: 5 TABLET ORAL at 05:00

## 2017-10-27 RX ADMIN — OXYCODONE HYDROCHLORIDE 5 MG: 5 TABLET ORAL at 09:16

## 2017-10-27 RX ADMIN — FLUCONAZOLE 200 MG: 200 TABLET ORAL at 08:06

## 2017-10-27 RX ADMIN — DARBEPOETIN ALFA 25 MCG: 25 INJECTION, SOLUTION INTRAVENOUS; SUBCUTANEOUS at 12:19

## 2017-10-27 RX ADMIN — SODIUM BICARBONATE 650 MG TABLET 1300 MG: at 08:06

## 2017-10-27 RX ADMIN — FLUDROCORTISONE ACETATE 0.2 MG: 0.1 TABLET ORAL at 08:06

## 2017-10-27 RX ADMIN — FUROSEMIDE 20 MG: 20 TABLET ORAL at 08:06

## 2017-10-27 RX ADMIN — MYCOPHENOLIC ACID 360 MG: 360 TABLET, DELAYED RELEASE ORAL at 10:03

## 2017-10-27 RX ADMIN — SODIUM BICARBONATE 650 MG TABLET 1300 MG: at 13:51

## 2017-10-27 ASSESSMENT — PAIN DESCRIPTION - DESCRIPTORS: DESCRIPTORS: SHOOTING

## 2017-10-27 NOTE — DISCHARGE SUMMARY
Medicine Service - [facility] Discharge Summary   Date of Service: 10/27/2017    Camacho Bhagat MRN# [id number]   YOB: 1965 Age: 52 year old     Date of Admission:  10/24/2017  Date of Discharge:  10/27/2017  Admitting Physician:  Jesse Torres MD  Discharge Physician:  Jesse Torres MD  Discharging Service:  [facility] 1     Primary Provider: Shay Kirkpatrick         Reason for Admission:   Hyperkalemia  S/p liver transplantation 2/2 cryptogenic cirrhosis/CASTAÑEDA and HCC s/p TACE 9/2016  H/o neutropenic colitis s/p ileostomy  Anemia  CKD III          Discharge Diagnosis:   Hyperkalemia  S/p liver transplantation 2/2 cryptogenic cirrhosis/CASTAÑEDA and HCC s/p TACE 9/2016  H/o neutropenic colitis s/p ileostomy  Anemia  CKD III         Procedures & Significant Findings:   MRCP: 10/24/17  IMPRESSION:    1. Postsurgical changes of liver transplant. No evidence of  inflammation or biliary dilation. Iron deposition in the liver.   2. Continued splenomegaly with iron deposition in the spleen.   3. Small to moderate complex right and trace left pleural effusions.  Small ascites.         Consultations:   Gastroenterology  Nephrology         Hospital Course by Problem:    Hyperkalemia, recurrent  Hypomagnesemia  On admission, patient noted to have potassium of 7.2 in setting of tacrolimus, lasix, fludrocortisone. Was shifted in the ED with calcium, insulin, D50 as well as given lasix and fluids  (patient refused kayexelate). The patient was noted to have a decrease in his potassium immediately after shifting, but persisted in his elevation later in the evening. He was again shifted with the above and also consented to take kayexelate. Nephrology was consulted for elevation in potassium and recommended increasing his baseline bicarbonate, fludrocortisone, and placing him on a potassium binder as an outpatient. It was felt the elevation in potassium was likely 2/2 his usage of tacrolimus causing hypoaldosteronism.   -will  plan to start potassium binder patiromir (PA completed and paperwork sent)  -have increased florinef to 0.3mg qd  -will increase lasix to 20mg BID  -will increase bicarbonate to 1300 mg TID  -will need to address tacrolimus dosing with transplant GI  -put infusion plan in place for hyperK  -pt to get f/u BMP three times next week      S/p liver transplantation 2/2 cryptogenic cirrhosis/CASTAÑEDA and HCC s/p TACE 9/2016  Patient maintained on 7mg BID tacrolimus and MMF 360mg BID as well as diflucan for tacrolimus boosting. Goal tacrolimus level of 5-8. CMV IgG positive and CMV nucleic acid + as well as EBV VCA IgG +. Has been compliant with his medications. Tacrolimus level checked on admission and noted to be 6.3. Later recheck in hospital stay noted to be 10.3. Patient had his AM dose on day of d/c held with plan to follow up with labwork on Monday as patient requesting to leave this PM though recommend the patient remain in house for ongoing assessment  -will need to address immunosuppression regimen with GI as outpatient  -instructed patient to call transplant GI on Monday to address    Normocytic Anemia  CKD III  Patient with chronic normocytic anemia. Hemolysis workup negative. Iron stores replete. Most likely that patient's ongoing anemia is 2/2 lack of EPO as well as decreased absorption 2/2 bowel resection.   -s/p multiple transfusions during stay  -began EPO 25mcg on day of discharge  -will plan to f/u with nephrology within one week    Physical Exam on day of Discharge:  Vital signs:  Temp: 98.2  F (36.8  C) Temp src: Oral BP: 139/78   Heart Rate: 78 Resp: 16 SpO2: 98 % O2 Device: None (Room air)   Height: 182.9 cm (6') Weight: 84.2 kg (185 lb 11.2 oz)  Estimated body mass index is 25.19 kg/(m^2) as calculated from the following:    Height as of this encounter: 1.829 m (6').    Weight as of this encounter: 84.2 kg (185 lb 11.2 oz).        General: well appearing male in NAD  HEENT: AT/NC, EOMI, sclera  anicteric  Respiratory: CTAB  Heart/CV: RRR, S1, S2, no murmurs  Abdomen/GI: soft, NT, ND, BS+, ostomy site c/d/i  Extremities/MSK: moving all 4 well, no LE edema  Skin: warm and dry, necrotic looking area of toe that is stable in nature  Neuro: alert and oriented to person, place, time; no overt FND           Pending Results:   None         Discharge Medications:     Discharge Medication List as of 10/27/2017  7:32 PM      CONTINUE these medications which have CHANGED    Details   sodium bicarbonate 650 MG tablet Take 2 tablets (1,300 mg) by mouth 3 times daily, Disp-180 tablet, R-3, E-Prescribe      fludrocortisone (FLORINEF) 0.1 MG tablet Take 3 tablets (0.3 mg) by mouth daily, Disp-90 tablet, R-3, E-Prescribe      furosemide (LASIX) 20 MG tablet Take 1 tablet (20 mg) by mouth 2 times daily, Disp-60 tablet, R-3, E-Prescribe         CONTINUE these medications which have NOT CHANGED    Details   mycophenolic acid (GENERIC EQUIVALENT) 360 MG EC tablet Take 1 tablet (360 mg) by mouth every 12 hours, Disp-60 tablet, R-5, No Print Out      fluconazole (DIFLUCAN) 200 MG tablet Take 1 tablet (200 mg) by mouth daily, Disp-30 tablet, R-5, No Print Out      OMEPRAZOLE PO Take 20 mg by mouth every morning , Historical      GABAPENTIN PO Take 300 mg by mouth 3 times daily, Historical      tacrolimus (GENERIC EQUIVALENT) 5 MG capsule Take 1 capsule (5 mg) by mouth every 12 hours *take with 2 mg capsules for total dose 7 mg every 12 hours, Disp-60 capsule, R-11, E-Prescribe      tacrolimus (GENERIC EQUIVALENT) 1 MG capsule Take 2 capsules (2 mg) by mouth every 12 hours *take with 5 mg capsules for total of 7 mg every 12 hours, Disp-60 capsule, R-11, E-Prescribe      insulin glargine (LANTUS) 100 UNIT/ML injection Inject 50 Units Subcutaneous every morning, Historical      linagliptin (TRADJENTA) 5 MG TABS tablet Take 5 mg by mouth daily, Historical      sodium chloride, PF, 0.9% PF flush Flush 12 Urdu drain with 10mls NS BID,  flush 24 Fr drain with 20mls NS BID, Disp-600 mL, R-3, Local PrintPlease note pt flushes twice a day.      loperamide (IMODIUM) 2 MG capsule Take 2 capsules (4 mg) by mouth 2 times daily, Disp-120 capsule, R-3, E-Prescribe      amLODIPine (NORVASC) 5 MG tablet Take 2 tablets (10 mg) by mouth daily, Disp-60 tablet, R-3, E-Prescribe      cyclobenzaprine (FLEXERIL) 10 MG tablet Take 1 tablet (10 mg) by mouth 3 times daily as needed for muscle spasms, Disp-90 tablet, R-0, E-Prescribe         STOP taking these medications       amoxicillin-clavulanate (AUGMENTIN) 500-125 MG per tablet Comments:   Reason for Stopping:         oxyCODONE (ROXICODONE) 10 MG IR tablet Comments:   Reason for Stopping:         acetaminophen (TYLENOL) 325 MG tablet Comments:   Reason for Stopping:                    Discharge Instructions and Follow-Up:     Discharge Procedure Orders  Medication Therapy Management Referral   Referral Type: Med Therapy Management                 Discharge Disposition:   Home         Condition on Discharge:   Discharge condition: Stable   Code status on discharge: Full Code        Date of service: 10/27/2017     90 minutes spent in discharge, including >50% in counseling and coordination of care, medication review and plan of care recommended on follow up.     Patient staffed with Dr. Torres

## 2017-10-27 NOTE — PLAN OF CARE
Problem: Goal Outcome Summary  Goal: Goal Outcome Summary  RN  1. Pt will be hemodynamically stable.  2. Pt and family will verbalize understanding of plan of care.  3. Pt will remain free of falls.     Outcome: No Change    D: Admitted on 10/24 for elevated K.   I: Monitored vitals and assessed pt status.   A: A0 x 4. VSS, on room air. SR. Afebrile. Intermittent back pain. Gave 5 mg oxycodone. Voiding adequately. Patient had hemoglobin 7.0 after administration of 1Liter fluids in evening shift. No signs of bleeding. MD aware. Empties Ileostomy bag independently. Abdominal fistula dressing CDI.  Pleasant and cooperative with cares. Slept through the night    P: Continue to monitor pt status and report changes to treatment team. Monitor K+.

## 2017-10-27 NOTE — PROGRESS NOTES
[facility]  HEPATOLOGY PROGRESS NOTE  Camacho Bhagat [id number]       CC: hyperkalemia  SUBJECTIVE:  Prior to admission, had lost weight due to unable to eat after bowel surgery. Now that he is eating, weight has stabilized.. Denies any abdominal pain, change in mentation, melena or hematochezia. Was given Augmentin for x3 days prior to admission due to finger ulceration. Has been having increased watery stools with increase in fluids. Was having semi formed stools at home.     ROS:  A 10-point review of systems was negative.    OBJECTIVE:  VS: /80 (BP Location: Left arm)  Pulse 74  Temp 97.9  F (36.6  C) (Oral)  Resp 16  Ht 1.829 m (6')  Wt 84.2 kg (185 lb 11.2 oz)  SpO2 98%  BMI 25.19 kg/m2   General: In no acute distress, no facial muscle wasting  Neuro: AOx3  Lymph: No cervical lymphadenopathy  HEENT:  Noscleral icterus, Nooral lesions  CV:. Skin warm and dry  Lungs: Respirations even and nonlabored on room air  Abd: Nontender, nondistended. Ileostomy with brown stool.    Extrem: Noperipehral edema  Skin: No jaundice  Psych: Pleasant    MEDICATIONS:  Current Facility-Administered Medications   Medication     ondansetron (ZOFRAN) injection 4 mg     sodium bicarbonate tablet 1,300 mg     tacrolimus (GENERIC EQUIVALENT) capsule 7 mg     magnesium sulfate 4 g in 100 mL sterile water (premade)     furosemide (LASIX) tablet 20 mg     mycophenolic acid (GENERIC EQUIVALENT) EC tablet 360 mg     naloxone (NARCAN) injection 0.1-0.4 mg     amLODIPine (NORVASC) tablet 10 mg     fluconazole (DIFLUCAN) tablet 200 mg     fludrocortisone (FLORINEF) tablet 0.2 mg     gabapentin (NEURONTIN) capsule 300 mg     insulin glargine (LANTUS) injection 50 Units     omeprazole (priLOSEC) CR capsule 20 mg     oxyCODONE (ROXICODONE) IR tablet 5 mg     glucose 40 % gel 15-30 g    Or     dextrose 50 % injection 25-50 mL    Or     glucagon injection 1 mg       REVIEW OF LABORATORY, PATHOLOGY AND IMAGING RESULTS:  BMP  Recent  Labs  Lab 10/27/17  0650 10/26/17  2157 10/26/17  1645 10/26/17  0835 10/26/17  0446    132* 134  --  133   POTASSIUM 6.0* 5.8* 6.3* 5.8* 6.4*   CHLORIDE 111* 108 108  --  108   JACOB 8.0* 8.0* 8.2*  --  8.1*   CO2 17* 17* 18*  --  18*   BUN 44* 44* 43*  --  40*   CR 2.13* 2.00* 2.11*  --  1.98*   * 168* 128*  --  100*     CBC  Recent Labs  Lab 10/27/17  0650 10/26/17  2201 10/26/17  0446  10/25/17  0907 10/25/17  0733   WBC 3.9*  --  4.1  --  4.2 5.0   RBC 2.34*  --  2.38*  --  2.21* 2.01*   HGB 6.9* 7.0* 7.2*  < > 6.7* 6.0*   HCT 21.1* 21.2* 21.6*  --  20.0* 19.3*   MCV 90  --  91  --  91 96   MCH 29.5  --  30.3  --  30.3 29.9   MCHC 32.7  --  33.3  --  33.5 31.1*   RDW 15.4*  --  15.4*  --  15.1* 15.6*   PLT 89*  --  88*  --  93* 97*   < > = values in this interval not displayed.  INRNo lab results found in last 7 days.  LFTs  Recent Labs  Lab 10/27/17  0650 10/26/17  0446 10/25/17  0733 10/24/17  1427   ALKPHOS 365* 454* 491* 224*   AST 44 82* 160* 32   ALT 40 60 55 23   BILITOTAL 0.5 0.5 1.1 0.4   PROTTOTAL 6.6* 6.8 7.3 7.4   ALBUMIN 2.4* 2.4* 2.6* 2.6*      Imaging Results:  MRCP 10/26/17  IMPRESSION:    1. Postsurgical changes of liver transplant. No evidence of  inflammation or biliary dilation. Iron deposition in the liver.   2. Continued splenomegaly with iron deposition in the spleen.   3. Small to moderate complex right and trace left pleural effusions.  Small ascites.    IMPRESSION:  Camacho Bhagat is a 52 year old male with a history of DDLT 3/4/17 for CASTAÑEDA and HCC complicated by bowel perforation, CMV viremia, right hemicolectomy (7/2017), DM II, HTN with hyperkalemia, CKD and anemia.     RECOMMENDATIONS:  1. Hyperkalemia  2. DDLT 3/4/17 for CASTAÑEDA, HCC  3. Immunosuppression  - hyperkalemia multifactorial from likely immunosuppression and CKD  - would not be a candidate for mTor at this time due to digital ischemic ulcers and plans for ileostomy takedown in January. Will discuss with  transplant surgery options due to <1 year post transplant. Has been on fluconazole to help boost tacrolimus levels per surgery.   - tacrolimus 7 mg BID and myphortic 360 mg BID. Trough level ~6.3. Last level of 10.3, pharmacy held dose.     Patient was discussed with attending physician, Dr. Bueno      Thank you for the opportunity to be involved with  Camacho TERESA Olayikna luciano. Please call with any questions or concerns.    LAMIN Castañeda, CNP  [phone]

## 2017-10-27 NOTE — PROGRESS NOTES
Prior Authorization Approval    [employer] 8.6 gm packs  Qty: 30  Day Supply: 30  Diagnosis: Hyperkalemia    Effective Dates: 10/27/2017 - 10/27/2019    Insurance: [employer]  Phone: [phone]  ID: [id number]  Case Number: [id number]  Submitted via: telephone    Note: This is a specialty medication. First month will be provided for free by [employer] rosen. Forms submitted 10/27/17.        Ninfa Peña  Pharmacy Liaison  Ph: [phone] Page: [phone]

## 2017-10-27 NOTE — PROGRESS NOTES
Care Coordinator Progress Note     Admission Date/Time:  10/24/2017  Attending MD:  Jesse Torres MD     Data  Chart reviewed, discussed with interdisciplinary team.   Patient was admitted for:    Hyperkalemia  Hepatocellular carcinoma (H)  Immunosuppressed status (H)  Liver transplant recipient (H).    Concerns with insurance coverage for discharge needs: None.  Current Living Situation: Patient lives with spouse.  Support System: Supportive  Services Involved: none prior to admission  Transportation: Family or Friend will provide  Barriers to Discharge:     Coordination of Care and Referrals: Provided patient/family with options for outpatient infusion and follow up.        Assessment  Patient admitted with recurrent hyperkalemia and anemia. Plan is for discharge home today. Per Nephrology, patient will need twice weekly labs and PRN normal saline infusions. Met with patient at bedside to discuss discharge planning and needs. Patient agreeable to discharge plan and appointment made for lab at his clinic for BMP on Monday. MD will put in therapy plan for [facility] clinic for infusions if needed.      Plan  Anticipated Discharge Date:  today  Anticipated Discharge Plan:  Home with follow up    Yolanda Cervantes RN

## 2017-10-27 NOTE — PROGRESS NOTES
Nephrology Progress Note  10/27/2017         Assessment & Recommendations:      52 YOM with a PMH of  ESLD/cryptogenic cirrhosis (CASTAÑEDA), HCC s/p TACE 09/2016, who is s/p liver transplantation in 03/2017, h/o neutropenic colitis/ishcemic bowel s/p colectomy 07/2017 who presents with recurrent hyperkalemia and anemia     EJ with CKD II  Creatinine increased to 2.1 over the last 24hrs with increased tacrolimus levels to 10   UO is still adequate with no volume overload at this time  Hyperkalemia persistent    Creatinine had been steady in 1.8-1.9 range over last 2 months, with UO appropriate for the intake  bl creatinine <1 and GFR 80s before the 8/2017   He may have had a decline in renal functions since the neutropenic ischemic colitis episode and EBV viremia treatment, also his tacrolimus has been up titrated to archive a therapeutic goal that has gone parallel with his renal decline.  Recurrent hyperkalemia with EJ and hypoaldosteronism (RTA IV from tacro) PTA , he may also have tubular injury and proximal tubular injury as well with the colostomy increased drainage also contributory his bicarb has been low     Will recommend  - Repeat give 1L NSS saline over the next 4 hrs  - UA , UACR and UPCR repeated today --> no change from baseline  - EBV pending  - keep lasix to 20mg BID  - follow BMP @2pm  - Patient really requesting to go , will need x2-3weekly labs and infusion normal saline as needed , please coordinate with the transplant coordinator  - he will need to follow in renal clinic within 1 week with labs  - Peripheral smear has no evidence of MAHA  - He may be a candidate for Patiromir at 8.4g/day atleast 4-6hrs apart from the IS meds  - He may also need to be considered for a non CNI therapy with mTOR per transplant surgery when that is a possibility     Hyperkalemia  Persistent   Will try IVF with lasix  On florinef for theresa replacements--> increased to 0.3  Monitor Q12hr labs  Hypomagnesemia likely sec  to tacrolimus as well      Euvolemic with HTN  controlled blood pressures with amlodipine  Can consider additional coreg if the BP are higher with florinef higher dose     Anemia  LDH elevated -- can be an inflammatory marker Vs sign of hemolysis although peripheral smear -ve for MAHA  Retics index is 0.4 and he has hypo-proliferation response as well which may be sec to BM suppression in addition to peripheral destruction since platelets are also decreased. He was recently treated for EBV in 7/2017 and has had typhlitis as well.  Repeat EBV pending  Will give Aranesp 25mcg one time dose today and can be monitored out patient for further needs  Transfuse for <7     OLT 3/2017 for CASTAÑEDA and cryptogenic cirrhosis, HCC SP TACE 09/2016  cb infections recurrent  On tacro MMF  tacro goals per notes are ~6 (level increased at 10.3)  Pharmacy to dose adjust     DMII management per primary     Recommendations were communicated to primary team via phone     Seen and discussed with Dr. Subhash Barraza MD   [phone]    Interval History :   In the last 24 hours Camacho Bhagat has been stable and asymptomatic eager to go home, potassium has been as high as 6.4 and 5.8 last checked , lasix dose repeated in the morning.    Review of Systems:   I reviewed the following systems:  GI: + appetite. - nausea or vomiting or diarrhea.   Neuro:  - confusion  Constitutional:  - fever or chills  CV: - dyspnea or edema.  - chest pain.    Physical Exam:   I/O last 3 completed shifts:  In: 480 [P.O.:480]  Out: 3420 [Urine:2845; Stool:575]   /78  Pulse 74  Temp 98.2  F (36.8  C) (Oral)  Resp 16  Ht 1.829 m (6')  Wt 84.2 kg (185 lb 11.2 oz)  SpO2 98%  BMI 25.19 kg/m2     GENERAL APPEARANCE: no distress  EYES:  - scleral icterus, pupils equal  HENT: mouth without ulcers or lesions  PULM: lungs clear to auscultation,  bilaterally, equal air movement, no clubbing  CV: regular rhythm, normal rate, no rub     -JVP -     -edema -    GI: soft, non tender, nondistended  INTEGUMENT: no cyanosis, - rash  NEURO:  - asterixis       Labs:   All labs reviewed by me  Electrolytes/Renal -   Recent Labs   Lab Test  10/27/17   0650  10/26/17   2157  10/26/17   1645   10/25/17   1801  10/25/17   0733  10/25/17   0200   10/24/17   1427  10/24/17   1135   NA  135  132*  134   < >  132*  134   --    < >  133  135   POTASSIUM  6.0*  5.8*  6.3*   < >  6.2*  6.0*  6.2*   < >  6.9*  7.2*   CHLORIDE  111*  108  108   < >  106  108   --    < >  110*  110*   CO2  17*  17*  18*   < >  17*  17*   --    < >  18*  18*   BUN  44*  44*  43*   < >  40*  40*   --    < >  41*  41*   CR  2.13*  2.00*  2.11*   < >  1.82*  1.94*   --    < >  1.75*  1.82*   GLC  135*  168*  128*   < >  135*  161*   --    < >  231*  221*   JACOB  8.0*  8.0*  8.2*   < >  8.3*  8.6   --    < >  8.4*  8.3*   MAG   --    --    --    --   1.9  1.3*  1.1*   < >  1.3*  1.4*   PHOS   --    --    --    --    --   4.8*   --    --   3.8  3.9    < > = values in this interval not displayed.       CBC -   Recent Labs   Lab Test  10/27/17   0650  10/26/17   2201  10/26/17   0446   10/25/17   0907   WBC  3.9*   --   4.1   --   4.2   HGB  6.9*  7.0*  7.2*   < >  6.7*   PLT  89*   --   88*   --   93*    < > = values in this interval not displayed.       LFTs -   Recent Labs   Lab Test  10/27/17   0650  10/26/17   0446  10/25/17   0733   ALKPHOS  365*  454*  491*   BILITOTAL  0.5  0.5  1.1   ALT  40  60  55   AST  44  82*  160*   PROTTOTAL  6.6*  6.8  7.3   ALBUMIN  2.4*  2.4*  2.6*       Iron Panel -   Recent Labs   Lab Test  09/14/17   1133  09/07/17   1610  02/08/16   1144   IRON  39  28*  93   IRONSAT  22  20  41   NELY  2947*   --    --          Imaging:  All imaging studies reviewed by me.     Current Medications:    [START ON 10/28/2017] fludrocortisone  0.3 mg Oral Daily     sodium bicarbonate  1,300 mg Oral TID     tacrolimus  7 mg Oral BID IS     furosemide  20 mg Oral BID     mycophenolic acid  360 mg Oral  2 times per day     amLODIPine  10 mg Oral Daily     fluconazole  200 mg Oral Daily     gabapentin (NEURONTIN) capsule 300 mg  300 mg Oral TID     insulin glargine  50 Units Subcutaneous QAM     omeprazole (priLOSEC) CR capsule 20 mg  20 mg Oral QAM       NaCl 250 mL/hr at 10/27/17 1219     Balaji Barraza MD     I have seen and evaluated the patient, reviewed the clinical data, and discussed the case with the renal fellow. See fellow's note for details. I agree with the evaluation and treatment plans.  Alex Cullen MD  Nephrology Staff

## 2017-10-27 NOTE — PLAN OF CARE
Problem: Patient Care Overview  Goal: Plan of Care/Patient Progress Review  1. Pt will be hemodynamically stable.  2. Pt and family will verbalize understanding of plan of care.  3. Pt will remain free of falls.      Outcome: Adequate for Discharge Date Met:  10/27/17  Pt A/O, VSS on RA. Discharge teaching given, pt denies further questions. Belongings packed up and sent w/pt and family. Pt transported in wheelchair to front lobby by family. PIV removed w/out issue.

## 2017-10-27 NOTE — PLAN OF CARE
Problem: Goal Outcome Summary  Goal: Goal Outcome Summary  RN  1. Pt will be hemodynamically stable.  2. Pt and family will verbalize understanding of plan of care.  3. Pt will remain free of falls.     Outcome: No Change    D: Admitted on 10/24 for elevated K.   I: Monitored vitals and assessed pt status.   A: A0 x 4. VSS, on room air. SR. Afebrile. Intermittent back pain. Patient forgetful at time and bed alarm active. Patient slept between cares.  MRI done this afternoon.  P: Continue to monitor pt status and report changes to treatment team.

## 2017-10-27 NOTE — PROVIDER NOTIFICATION
Critical lab 6.9 hemoglobin.  Notified Hilary Marie via text page. Passed off information to oncoming RN.

## 2017-10-28 NOTE — PLAN OF CARE
Problem: Patient Care Overview  Goal: Plan of Care/Patient Progress Review  1. Pt will be hemodynamically stable.  2. Pt and family will verbalize understanding of plan of care.  3. Pt will remain free of falls.      Outcome: No Change  Assumed cares [id number]. Pt A/O, VSS on RA. PIV x2 SL. Tele SR. Pt received 1 unit of blood this morning for hgb 6.9, tolerated well. Pain managed w/Oxycodone, denies nausea, tolerating low potassium diet. Plan for discharge this evening. Continue to monitor and w/POC.

## 2017-10-28 NOTE — PROGRESS NOTES
DISCHARGE   Discharged to: Home  Via: Automobile  Accompanied by: Family  Discharge Instructions: diet, activity, medications, follow up appointments, when to call the MD, and what to watchout for (i.e. s/s of infection, increasing SOB, palpitations, chest pain,)  Prescriptions: To be filled by  [place] discharge pharmacy per pt's request; medication list reviewed & sent with pt  Follow Up Appointments: arranged; information given  Belongings: All sent with pt  IV: out  Telemetry: off  Pt exhibits understanding of above discharge instructions; all questions answered.  Discharge Paperwork: faxed

## 2017-10-29 LAB
EBV DNA # SPEC NAA+PROBE: ABNORMAL {COPIES}/ML
EBV DNA SPEC NAA+PROBE-LOG#: 4.5 {LOG_COPIES}/ML

## 2017-10-30 ENCOUNTER — HOSPITAL ENCOUNTER (OUTPATIENT)
Dept: LAB | Facility: CLINIC | Age: 52
Discharge: HOME | End: 2017-10-30
Attending: INTERNAL MEDICINE | Admitting: INTERNAL MEDICINE
Payer: COMMERCIAL

## 2017-10-30 ENCOUNTER — TELEPHONE (OUTPATIENT)
Dept: TRANSPLANT | Facility: CLINIC | Age: 52
End: 2017-10-30

## 2017-10-30 ENCOUNTER — CARE COORDINATION (OUTPATIENT)
Dept: NEPHROLOGY | Facility: CLINIC | Age: 52
End: 2017-10-30

## 2017-10-30 DIAGNOSIS — D72.819 DECREASED WHITE BLOOD CELL COUNT: ICD-10-CM

## 2017-10-30 DIAGNOSIS — E87.5 SERUM POTASSIUM ELEVATED: Primary | ICD-10-CM

## 2017-10-30 DIAGNOSIS — Z94.4 HISTORY OF LIVER TRANSPLANT (H): ICD-10-CM

## 2017-10-30 DIAGNOSIS — Z79.60 LONG-TERM USE OF IMMUNOSUPPRESSANT MEDICATION: ICD-10-CM

## 2017-10-30 DIAGNOSIS — E87.5 HYPERKALEMIA: Primary | ICD-10-CM

## 2017-10-30 PROBLEM — Z93.9 HISTORY OF CREATION OF OSTOMY (H): Status: ACTIVE | Noted: 2017-10-30

## 2017-10-30 LAB
ALBUMIN SERPL-MCNC: 2.9 G/DL (ref 3.4–5)
ALP SERPL-CCNC: 518 U/L (ref 40–150)
ALT SERPL W P-5'-P-CCNC: 36 U/L (ref 0–70)
ANION GAP SERPL CALCULATED.3IONS-SCNC: 8 MMOL/L (ref 3–14)
AST SERPL W P-5'-P-CCNC: 38 U/L (ref 0–45)
BASOPHILS # BLD AUTO: 0 10E9/L (ref 0–0.2)
BASOPHILS NFR BLD AUTO: 0.2 %
BILIRUB DIRECT SERPL-MCNC: 0.2 MG/DL (ref 0–0.2)
BILIRUB SERPL-MCNC: 0.5 MG/DL (ref 0.2–1.3)
BUN SERPL-MCNC: 47 MG/DL (ref 7–30)
CALCIUM SERPL-MCNC: 8.3 MG/DL (ref 8.5–10.1)
CHLORIDE SERPL-SCNC: 111 MMOL/L (ref 94–109)
CO2 SERPL-SCNC: 18 MMOL/L (ref 20–32)
CREAT SERPL-MCNC: 2.32 MG/DL (ref 0.66–1.25)
DIFFERENTIAL METHOD BLD: NORMAL
EOSINOPHIL # BLD AUTO: 0 10E9/L (ref 0–0.7)
EOSINOPHIL NFR BLD AUTO: 0.8 %
ERYTHROCYTE [DISTWIDTH] IN BLOOD BY AUTOMATED COUNT: 15.9 % (ref 10–15)
GFR SERPL CREATININE-BSD FRML MDRD: 30 ML/MIN/1.7M2
GLUCOSE SERPL-MCNC: 196 MG/DL (ref 70–99)
HCT VFR BLD AUTO: 25.9 % (ref 40–53)
HGB BLD-MCNC: 8.4 G/DL (ref 13.3–17.7)
IMM GRANULOCYTES # BLD: 0.1 10E9/L (ref 0–0.4)
IMM GRANULOCYTES NFR BLD: 1.7 %
LYMPHOCYTES # BLD AUTO: 2.1 10E9/L (ref 0.8–5.3)
LYMPHOCYTES NFR BLD AUTO: 39.6 %
MAGNESIUM SERPL-MCNC: 1.6 MG/DL (ref 1.6–2.3)
MCH RBC QN AUTO: 30.7 PG (ref 26.5–33)
MCHC RBC AUTO-ENTMCNC: 32.4 G/DL (ref 31.5–36.5)
MCV RBC AUTO: 95 FL (ref 78–100)
MONOCYTES # BLD AUTO: 0.5 10E9/L (ref 0–1.3)
MONOCYTES NFR BLD AUTO: 9.2 %
NEUTROPHILS # BLD AUTO: 2.5 10E9/L (ref 1.6–8.3)
NEUTROPHILS NFR BLD AUTO: 48.5 %
NRBC # BLD AUTO: 0 10*3/UL
NRBC BLD AUTO-RTO: 0 /100
PHOSPHATE SERPL-MCNC: 3.9 MG/DL (ref 2.5–4.5)
PLATELET # BLD AUTO: 96 10E9/L (ref 150–450)
POTASSIUM SERPL-SCNC: 6.4 MMOL/L (ref 3.4–5.3)
PROT SERPL-MCNC: 7.8 G/DL (ref 6.8–8.8)
RBC # BLD AUTO: 2.74 10E12/L (ref 4.4–5.9)
SODIUM SERPL-SCNC: 137 MMOL/L (ref 133–144)
TACROLIMUS BLD-MCNC: 11.3 UG/L (ref 5–15)
TME LAST DOSE: NORMAL H
WBC # BLD AUTO: 5.2 10E9/L (ref 4–11)

## 2017-10-30 PROCEDURE — 36415 COLL VENOUS BLD VENIPUNCTURE: CPT | Performed by: INTERNAL MEDICINE

## 2017-10-30 PROCEDURE — 80197 ASSAY OF TACROLIMUS: CPT | Performed by: INTERNAL MEDICINE

## 2017-10-30 PROCEDURE — 85027 COMPLETE CBC AUTOMATED: CPT | Performed by: INTERNAL MEDICINE

## 2017-10-30 PROCEDURE — 85004 AUTOMATED DIFF WBC COUNT: CPT | Performed by: INTERNAL MEDICINE

## 2017-10-30 PROCEDURE — 80048 BASIC METABOLIC PNL TOTAL CA: CPT | Performed by: INTERNAL MEDICINE

## 2017-10-30 PROCEDURE — 84100 ASSAY OF PHOSPHORUS: CPT | Performed by: INTERNAL MEDICINE

## 2017-10-30 PROCEDURE — 83735 ASSAY OF MAGNESIUM: CPT | Performed by: INTERNAL MEDICINE

## 2017-10-30 PROCEDURE — 80076 HEPATIC FUNCTION PANEL: CPT | Performed by: INTERNAL MEDICINE

## 2017-10-30 NOTE — PROGRESS NOTES
Patient got labs checked today and potassium is 6.4. The discharge plan is for patient to receive IV fluids and IV lasix if potassium is elevated. Patient's liver transplant coordinator arranged for an infusion appointment at [facility] tomorrow morning.     I paged Balaji Barraza MD to clarify if there is a threshold for getting outpatient IV fluids vs sending patient to the ER. Said if K is 7 or higher, should page MD, but otherwise okay to try to manage outpatient. Patient should also have labs rechecked 30 minutes after infusion is complete to recheck K. I updated the therapy plan with this order. Also confirmed that it is okay for patient to wait to get fluids tomorrow rather than getting them today.     I called the patient to relay these recommendations. He is aware that an infusion appointment is scheduled for tomorrow. He says Veltassa is scheduled to be delivered tomorrow so will start it once he gets it. Hopeful this will minimize his need for infusions and admissions.    Patient in agreement to be seen for hospital follow up appt this week. Appointment scheduled with Jovanna Foster NP on Wednesday 11/1.    Quang Conteh RN, BAN  Nephrology RN Care Coordinator

## 2017-10-30 NOTE — TELEPHONE ENCOUNTER
Message left for Camacho that an appt has been made for him at [facility] in [place] at 10am on 10/31/2017, he is to receive a liter of fluid for potassium of 6.4 today.  Message contained information that the nephrologist was being notified of potassium elevation.  Per discharge info, pt is to be seen by nephrology within the week.

## 2017-10-30 NOTE — PROGRESS NOTES
Left VM for patient to return call to follow up after his hospital discharge. Patient was admitted for hyperkalemia.     - Will see if patient has started Veltassa. Looks like the paperwork was started inpatient and a prior auth was approved, but he was to initiate this outpatient     - Will discuss hospital follow up appointment in nephrology this week or next week    - Per message from patient's liver transplant coordinator, patient is scheduled for labs on M,W,F this week and a therapy plan has been entered for PRN IV fluids and lasix if needed for elevated potassium     Quang Conteh, RN, BAN  Nephrology RN Care Coordinator

## 2017-10-31 ENCOUNTER — TELEPHONE (OUTPATIENT)
Dept: TRANSPLANT | Facility: CLINIC | Age: 52
End: 2017-10-31

## 2017-10-31 ENCOUNTER — INFUSION THERAPY VISIT (OUTPATIENT)
Dept: INFUSION THERAPY | Facility: CLINIC | Age: 52
End: 2017-10-31
Attending: TRANSPLANT SURGERY
Payer: COMMERCIAL

## 2017-10-31 ENCOUNTER — HOSPITAL ENCOUNTER (OUTPATIENT)
Facility: CLINIC | Age: 52
Setting detail: SPECIMEN
Discharge: HOME | End: 2017-10-31
Attending: TRANSPLANT SURGERY | Admitting: INTERNAL MEDICINE
Payer: COMMERCIAL

## 2017-10-31 ENCOUNTER — TELEPHONE (OUTPATIENT)
Dept: PHARMACY | Facility: OTHER | Age: 52
End: 2017-10-31

## 2017-10-31 VITALS — TEMPERATURE: 97.6 F | DIASTOLIC BLOOD PRESSURE: 85 MMHG | RESPIRATION RATE: 16 BRPM | SYSTOLIC BLOOD PRESSURE: 150 MMHG

## 2017-10-31 DIAGNOSIS — Z93.9 HISTORY OF CREATION OF OSTOMY (H): Primary | ICD-10-CM

## 2017-10-31 DIAGNOSIS — Z94.4 S/P LIVER TRANSPLANT (H): ICD-10-CM

## 2017-10-31 DIAGNOSIS — Z94.4 LIVER TRANSPLANT RECIPIENT (H): ICD-10-CM

## 2017-10-31 DIAGNOSIS — R79.89 ELEVATED SERUM CREATININE: ICD-10-CM

## 2017-10-31 DIAGNOSIS — E87.5 SERUM POTASSIUM ELEVATED: ICD-10-CM

## 2017-10-31 LAB
ANION GAP SERPL CALCULATED.3IONS-SCNC: 8 MMOL/L (ref 3–14)
BUN SERPL-MCNC: 48 MG/DL (ref 7–30)
CALCIUM SERPL-MCNC: 8 MG/DL (ref 8.5–10.1)
CHLORIDE SERPL-SCNC: 112 MMOL/L (ref 94–109)
CO2 SERPL-SCNC: 17 MMOL/L (ref 20–32)
CREAT SERPL-MCNC: 2.19 MG/DL (ref 0.66–1.25)
GFR SERPL CREATININE-BSD FRML MDRD: 32 ML/MIN/1.7M2
GLUCOSE SERPL-MCNC: 116 MG/DL (ref 70–99)
POTASSIUM SERPL-SCNC: 6.1 MMOL/L (ref 3.4–5.3)
SODIUM SERPL-SCNC: 137 MMOL/L (ref 133–144)

## 2017-10-31 PROCEDURE — 25000132 ZZH RX MED GY IP 250 OP 250 PS 637: Performed by: STUDENT IN AN ORGANIZED HEALTH CARE EDUCATION/TRAINING PROGRAM

## 2017-10-31 PROCEDURE — 36415 COLL VENOUS BLD VENIPUNCTURE: CPT

## 2017-10-31 PROCEDURE — 80048 BASIC METABOLIC PNL TOTAL CA: CPT | Performed by: INTERNAL MEDICINE

## 2017-10-31 PROCEDURE — 25000128 H RX IP 250 OP 636: Performed by: TRANSPLANT SURGERY

## 2017-10-31 PROCEDURE — 96361 HYDRATE IV INFUSION ADD-ON: CPT

## 2017-10-31 PROCEDURE — 96360 HYDRATION IV INFUSION INIT: CPT

## 2017-10-31 RX ORDER — FUROSEMIDE 20 MG
20 TABLET ORAL DAILY
Status: DISCONTINUED | OUTPATIENT
Start: 2017-10-31 | End: 2017-10-31 | Stop reason: HOSPADM

## 2017-10-31 RX ORDER — TACROLIMUS 1 MG/1
4 CAPSULE ORAL EVERY 12 HOURS
Qty: 60 CAPSULE | Refills: 11 | Status: SHIPPED | OUTPATIENT
Start: 2017-10-31 | End: 2017-11-01

## 2017-10-31 RX ORDER — FUROSEMIDE 20 MG
20 TABLET ORAL DAILY
Status: CANCELLED
Start: 2017-10-31

## 2017-10-31 RX ADMIN — FUROSEMIDE 20 MG: 20 TABLET ORAL at 12:27

## 2017-10-31 RX ADMIN — SODIUM CHLORIDE 1000 ML: 9 INJECTION, SOLUTION INTRAVENOUS at 11:00

## 2017-10-31 NOTE — PROGRESS NOTES
Infusion Nursing Note:  Camacho Bhagat presents today for IVF.    Patient seen by provider today: No   present during visit today: Not Applicable.    Note: Orders included to give PO lasix.  Called Abril Doty RN transplant coordinator to clarify if this should be given IV.  She said we should give it PO.    Potassium level drawn 30 minutes post hydration.  Result was 6.1.  This was called to Abril Doty RN.    Intravenous Access:  Peripheral IV placed, difficult IV start, four attempts.  Patient has needed US starts in the past but not today.    Treatment Conditions:  Not Applicable.      Post Infusion Assessment:  Patient tolerated infusion without incident.  Blood return noted pre and post infusion.  Site patent and intact, free from redness, edema or discomfort.  No evidence of extravasations.  Access discontinued per protocol.    Discharge Plan:   Patient discharged in stable condition accompanied by: self.  Departure Mode: Ambulatory.    Lauren Bhgaat RN

## 2017-10-31 NOTE — TELEPHONE ENCOUNTER
Spoke with Camacho, instructed to reduce dose to 4mg Q 12 hours,  Plan for repeat labs later in the week.  Camacho does have an appt with nephrology tomorrow.  He will do labs for that visit, but not tacrolimus level.  Instructed to continue taking fluconazole.

## 2017-10-31 NOTE — MR AVS SNAPSHOT
After Visit Summary   10/31/2017    Camacho Bhagat    MRN: [id number]           Patient Information     Date Of Birth          1965        Visit Information        Provider Department      10/31/2017 10:00 AM [facility] INFUSION CHAIR 3 [facility] Infusion [facility]        Today's Diagnoses     History of creation of ostomy    -  1    Elevated serum creatinine        Serum potassium elevated        S/P liver transplant (H)           Follow-ups after your visit        Your next 10 appointments already scheduled     Nov 01, 2017 11:30 AM CDT   Lab with MARIA LUISA LAB   [facility] Lab ([facility])    [address] [place] [id number]   [phone]            Nov 01, 2017  1:00 PM CDT   (Arrive by 12:30 PM)   Return Visit with Cinda Cazares NP   [facility] Nephrology ([facility])    [address] [place] [address]   [phone]            Nov 20, 2017  2:45 PM CST   (Arrive by 2:30 PM)   Return Vascular Visit with Estefani Beal MD   [facility] Vascular Clinic ([facility])    [address] [place] [id number]   [phone]            Dec 01, 2017  8:45 AM CST   (Arrive by 8:30 AM)   Return Liver Transplant with Toñito Camejo MD   [facility] Hepatology ([facility])    [address] [place] [address]   [phone]            Dec 13, 2017 10:30 AM CST   (Arrive by 10:15 AM)   Return Visit with Sara Dinh MD   [facility] Colon and Rectal Surgery ([facility])    [address] [place] [id number]   [phone]            Dec 13, 2017 12:30 PM CST   (Arrive by 12:15 PM)   PAC EVALUATION with [facility]   [facility] Preoperative Assessment Center ([facility])    [address] [place] [address]  [address]   [phone]            Dec 13, 2017  1:30 PM CST   (Arrive by 1:15 PM)   PAC RN ASSESSMENT with Jonathon Pac Rn   [facility] Preoperative Assessment Center ([facility] [place])    [address] [facility] [address]   [phone]            Dec 13, 2017  2:00 PM CST   (Arrive by 1:45 PM)   PAC Anesthesia Consult with Jonathon Pac Anesthesiologist   [facility] Preoperative Assessment [place] ([facility] [place])    [address] [facility] [address]   [phone]            Jan 05, 2018   Procedure with Sara Dinh MD   [facility] [place] Same Day Surgery (--)    [address] [address] [phone]              Who to contact     If you have questions or need follow up information about today's clinic visit or your schedule please contact [facility] INF[facility]ION [facility] directly at [phone].  Normal or non-critical lab and imaging results will be communicated to you by [facility], letter or phone within 4 business days after the clinic has received the results. If you do not hear from us within 7 days, please contact the clinic through [employer] or phone. If you have a critical or abnormal lab result, we will notify you by phone as soon as possible.  Submit refill requests through [employer] or call your pharmacy and they will forward the refill request to us. Please allow 3 business days for your refill to be completed.          Additional Information About Your Visit        [employer] Information     [employer] gives you secure access to your electronic health record. If you see a primary care provider, you can also send messages to your care team and make appointments. If you have questions, please call your primary care clinic.  If you do not have a primary care provider, please call [phone] and they will assist you.        Care EveryWhere ID     This is your Care EveryWhere ID. This could be used by  other organizations to access your [facility] medical records  [id number]        Your Vitals Were     Temperature Respirations                97.6  F (36.4  C) (Tympanic) 16           Blood Pressure from Last 3 Encounters:   10/31/17 150/85   10/27/17 146/80   10/23/17 133/84    Weight from Last 3 Encounters:   10/27/17 84.2 kg (185 lb 11.2 oz)   10/19/17 85 kg (187 lb 4.8 oz)   10/13/17 84.6 kg (186 lb 6.4 oz)              We Performed the Following     Basic metabolic panel          Today's Medication Changes          These changes are accurate as of: 10/31/17  3:51 PM.  If you have any questions, ask your nurse or doctor.               These medicines have changed or have updated prescriptions.        Dose/Directions    tacrolimus 1 MG capsule   Commonly known as:  GENERIC EQUIVALENT   This may have changed:    - how much to take  - additional instructions  - Another medication with the same name was removed. Continue taking this medication, and follow the directions you see here.   Used for:  Liver transplant recipient (H)   Changed by:  Abril Doty RN        Dose:  4 mg   Take 4 capsules (4 mg) by mouth every 12 hours   Quantity:  60 capsule   Refills:  11            Where to get your medicines      These medications [facility] to [facility] [address] [place] [address] AT [facility] [address]  [address] [place] [address]     Phone:  [phone]     tacrolimus 1 MG capsule                Primary Care Provider    Shay Kirkpatrick MD       PWB  MD [address]        Equal Access to Services     [facility] AH: Hadii aad ku hadasho Soomaali, waaxda luqadaha, qaybta kaalmada adeegyada, waxamanuel sarah hayjarrod craig . So [facility] [phone].    ATENCIÓN: Si habla español, tiene a zheng disposición servicios gratuitos de asistencia lingüística. Llame al [phone].    We comply with applicable federal civil rights laws and [place] laws. We do not discriminate on the basis  of race, color, national origin, age, disability, sex, sexual orientation, or gender identity.            Thank you!     Thank you for choosing [facility] INFUSION SERVICES  for your care. Our goal is always to provide you with excellent care. Hearing back from our patients is one way we can continue to improve our services. Please take a few minutes to complete the written survey that you may receive in the mail after your visit with us. Thank you!             Your Updated Medication List - Protect others around you: Learn how to safely use, store and throw away your medicines at www.disposemymeds.org.          This list is accurate as of: 10/31/17  3:51 PM.  Always use your most recent med list.                   Brand Name Dispense Instructions for use Diagnosis    amLODIPine 5 MG tablet    NORVASC    60 tablet    Take 2 tablets (10 mg) by mouth daily    History of liver transplant (H), Elevated blood pressure reading, Long-term use of immunosuppressant medication       cyclobenzaprine 10 MG tablet    FLEXERIL    90 tablet    Take 1 tablet (10 mg) by mouth 3 times daily as needed for muscle spasms    Immunosuppression (H)       fluconazole 200 MG tablet    DIFLUCAN    30 tablet    Take 1 tablet (200 mg) by mouth daily    History of liver transplant (H), Long-term use of immunosuppressant medication       fludrocortisone 0.1 MG tablet    FLORINEF    90 tablet    Take 3 tablets (0.3 mg) by mouth daily    Hyperkalemia       furosemide 20 MG tablet    LASIX    60 tablet    Take 1 tablet (20 mg) by mouth 2 times daily    Hyperkalemia       GABAPENTIN PO      Take 300 mg by mouth 3 times daily        insulin glargine 100 UNIT/ML injection    LANTUS     Inject 50 Units Subcutaneous every morning        linagliptin 5 MG Tabs tablet    TRADJENTA     Take 5 mg by mouth daily        loperamide 2 MG capsule    IMODIUM    120 capsule    Take 2 capsules (4 mg) by mouth 2 times daily    S/P right hemicolectomy        mycophenolic acid 360 MG EC tablet    GENERIC EQUIVALENT    60 tablet    Take 1 tablet (360 mg) by mouth every 12 hours    History of liver transplant (H), Long-term use of immunosuppressant medication       OMEPRAZOLE PO      Take 20 mg by mouth every morning        sodium bicarbonate 650 MG tablet     180 tablet    Take 2 tablets (1,300 mg) by mouth 3 times daily    Hyperkalemia       sodium chloride (PF) 0.9% PF flush     600 mL    Flush 12 Persian drain with 10mls NS BID, flush 24 Fr drain with 20mls NS BID    Intra-abdominal abscess (H)       tacrolimus 1 MG capsule    GENERIC EQUIVALENT    60 capsule    Take 4 capsules (4 mg) by mouth every 12 hours    Liver transplant recipient (H)       VELTASSA 8.4 G packet   Generic drug:  patiromer     30 each    Take 8.4 g by mouth daily    Hyperkalemia

## 2017-10-31 NOTE — TELEPHONE ENCOUNTER
MTM referral from: Transitions of Care (recent hospital discharge or ED visit)    MTM referral outreach attempt #1 on October 31, 2017 at 1:54 PM      Outcome: Left Message    Fabiola Arceo, MTM Coordinator Intern

## 2017-10-31 NOTE — TELEPHONE ENCOUNTER
Message left for Camacho reminding him to come at 11:30 tomorrow to do his labs before his appt at 12:30 with Jovanna Cazares.

## 2017-11-01 ENCOUNTER — DOCUMENTATION ONLY (OUTPATIENT)
Dept: TRANSPLANT | Facility: CLINIC | Age: 52
End: 2017-11-01

## 2017-11-01 ENCOUNTER — OFFICE VISIT (OUTPATIENT)
Dept: NEPHROLOGY | Facility: CLINIC | Age: 52
End: 2017-11-01
Payer: COMMERCIAL

## 2017-11-01 ENCOUNTER — TELEPHONE (OUTPATIENT)
Dept: TRANSPLANT | Facility: CLINIC | Age: 52
End: 2017-11-01

## 2017-11-01 VITALS
SYSTOLIC BLOOD PRESSURE: 151 MMHG | WEIGHT: 190.4 LBS | HEART RATE: 91 BPM | BODY MASS INDEX: 25.79 KG/M2 | OXYGEN SATURATION: 100 % | DIASTOLIC BLOOD PRESSURE: 87 MMHG | HEIGHT: 72 IN

## 2017-11-01 DIAGNOSIS — E87.5 HYPERKALEMIA: ICD-10-CM

## 2017-11-01 DIAGNOSIS — N17.9 ACUTE KIDNEY INJURY (H): Primary | ICD-10-CM

## 2017-11-01 DIAGNOSIS — E83.42 HYPOMAGNESEMIA: ICD-10-CM

## 2017-11-01 DIAGNOSIS — Z79.60 LONG-TERM USE OF IMMUNOSUPPRESSANT MEDICATION: ICD-10-CM

## 2017-11-01 DIAGNOSIS — N18.30 CHRONIC KIDNEY DISEASE, STAGE 3 (MODERATE): ICD-10-CM

## 2017-11-01 DIAGNOSIS — N18.4 CHRONIC KIDNEY DISEASE, STAGE 4 (SEVERE) (H): ICD-10-CM

## 2017-11-01 DIAGNOSIS — Z94.4 LIVER TRANSPLANT RECIPIENT (H): ICD-10-CM

## 2017-11-01 DIAGNOSIS — Z94.4 HISTORY OF LIVER TRANSPLANT (H): ICD-10-CM

## 2017-11-01 DIAGNOSIS — N18.30 CHRONIC KIDNEY DISEASE, STAGE 3 (MODERATE): Primary | ICD-10-CM

## 2017-11-01 LAB
ALBUMIN SERPL-MCNC: 3.1 G/DL (ref 3.4–5)
ANION GAP SERPL CALCULATED.3IONS-SCNC: 8 MMOL/L (ref 3–14)
BUN SERPL-MCNC: 48 MG/DL (ref 7–30)
CALCIUM SERPL-MCNC: 8.4 MG/DL (ref 8.5–10.1)
CHLORIDE SERPL-SCNC: 111 MMOL/L (ref 94–109)
CO2 SERPL-SCNC: 17 MMOL/L (ref 20–32)
CREAT SERPL-MCNC: 2.13 MG/DL (ref 0.66–1.25)
GFR SERPL CREATININE-BSD FRML MDRD: 33 ML/MIN/1.7M2
GLUCOSE SERPL-MCNC: 123 MG/DL (ref 70–99)
HGB BLD-MCNC: 8.5 G/DL (ref 13.3–17.7)
MAGNESIUM SERPL-MCNC: 1.4 MG/DL (ref 1.6–2.3)
PHOSPHATE SERPL-MCNC: 3.8 MG/DL (ref 2.5–4.5)
POTASSIUM SERPL-SCNC: 6.4 MMOL/L (ref 3.4–5.3)
SODIUM SERPL-SCNC: 136 MMOL/L (ref 133–144)

## 2017-11-01 PROCEDURE — 83735 ASSAY OF MAGNESIUM: CPT

## 2017-11-01 PROCEDURE — 99213 OFFICE O/P EST LOW 20 MIN: CPT | Mod: ZF

## 2017-11-01 PROCEDURE — 36415 COLL VENOUS BLD VENIPUNCTURE: CPT

## 2017-11-01 PROCEDURE — 80069 RENAL FUNCTION PANEL: CPT

## 2017-11-01 PROCEDURE — 85018 HEMOGLOBIN: CPT

## 2017-11-01 RX ORDER — MYCOPHENOLIC ACID 360 MG/1
360 TABLET, DELAYED RELEASE ORAL EVERY 6 HOURS
Qty: 120 TABLET | Refills: 5 | Status: SHIPPED | OUTPATIENT
Start: 2017-11-01 | End: 2017-11-15

## 2017-11-01 RX ORDER — PREDNISONE 10 MG/1
10 TABLET ORAL DAILY
Qty: 30 TABLET | Refills: 11 | Status: SHIPPED | OUTPATIENT
Start: 2017-11-01 | End: 2017-11-21

## 2017-11-01 RX ORDER — FUROSEMIDE 20 MG
20 TABLET ORAL DAILY
Qty: 60 TABLET | Refills: 3 | Status: SHIPPED | OUTPATIENT
Start: 2017-11-01 | End: 2017-11-01

## 2017-11-01 RX ORDER — TADALAFIL 5 MG
5 TABLET ORAL PRN
Refills: 1 | COMMUNITY
Start: 2017-06-07 | End: 2022-06-06

## 2017-11-01 RX ORDER — FUROSEMIDE 20 MG
20 TABLET ORAL 2 TIMES DAILY
Qty: 60 TABLET | Refills: 3 | Status: SHIPPED | OUTPATIENT
Start: 2017-11-01 | End: 2018-01-31

## 2017-11-01 RX ORDER — OXYCODONE HYDROCHLORIDE 10 MG/1
TABLET ORAL
Refills: 0 | COMMUNITY
Start: 2017-06-13 | End: 2017-11-06

## 2017-11-01 RX ORDER — TACROLIMUS 1 MG/1
2 CAPSULE ORAL EVERY 12 HOURS
Qty: 60 CAPSULE | Refills: 11 | Status: SHIPPED | OUTPATIENT
Start: 2017-11-01 | End: 2017-11-09

## 2017-11-01 ASSESSMENT — PAIN SCALES - GENERAL: PAINLEVEL: WORST PAIN (10)

## 2017-11-01 NOTE — PROGRESS NOTES
Spoke with Camacho after his clinic visit, instructed to make the following medication changes:  Increase myfortic 360mg to 1 tablet every 6 hours, instructed to take 2 tablets every 12 hours, q 6 hour schedule difficult to achieve.  Start prednisone 10mg 1 tablet daily, take with food.    Plan for tapering off tacrolimus.  Plan to decrease tacrolimus dose to 2mg Q 12 hours, after starting myfortic and prednisone.  Plan for labs to be done twice weekly.  Pt given a copy of medication changes on medication card.  Labs to be done on Friday.

## 2017-11-01 NOTE — NURSING NOTE
Chief Complaint   Patient presents with     RECHECK     Follow up EJ.       Initial /87  Pulse 91  Ht 1.829 m (6')  Wt 86.4 kg (190 lb 6.4 oz)  SpO2 100%  BMI 25.82 kg/m2 Estimated body mass index is 25.82 kg/(m^2) as calculated from the following:    Height as of this encounter: 1.829 m (6').    Weight as of this encounter: 86.4 kg (190 lb 6.4 oz).  Medication Reconciliation: complete   Lauren Dillon., CMA

## 2017-11-01 NOTE — TELEPHONE ENCOUNTER
MTM referral from: Transitions of Care (recent hospital discharge or ED visit)    MTM referral outreach attempt #2 on November 1, 2017 at 10:47 AM      Outcome: Patient not reachable after several attempts, will route to MTM Pharmacist/Provider as an FYI. Thank you for the referral.    Danica Plunkett MTM Coordinator

## 2017-11-01 NOTE — MR AVS SNAPSHOT
After Visit Summary   11/1/2017    Camacho Bhagat    MRN: [id number]           Patient Information     Date Of Birth          1965        Visit Information        Provider Department      11/1/2017 1:00 PM Cinda Cazares NP [facility] Nephrology        Today's Diagnoses     Hypomagnesemia    -  1    Hyperkalemia        Chronic kidney disease, stage 4 (severe) (H)          Care Instructions    1. Continue lasix 20 mg bid  2. Take the Valtassa daily beginning today  3. Have labs checked Friday  4. RTC in one month  5. Bring in blood pressure machine and readings next visit          Follow-ups after your visit        Follow-up notes from your care team     Return in about 4 weeks (around 11/29/2017).      Your next 10 appointments already scheduled     Nov 20, 2017  2:45 PM CST   (Arrive by 2:30 PM)   Return Vascular Visit with Estefani Beal MD   [facility] ([facility])    [address] [place] [address]  [address] [facility] [address]   [phone]            Dec 01, 2017  8:45 AM CST   (Arrive by 8:30 AM)   Return Liver Transplant with Toñito Camejo MD   [facility] Hepatology ([facility])    [address] [place] [address]  [address] [facility] [address]   [phone]            Dec 06, 2017 11:30 AM CST   Lab with  LAB   [facility] Lab ([facility])    [address] [place] [address]  [address] [facility] [address]   [phone]            Dec 06, 2017  1:00 PM CST   (Arrive by 12:30 PM)   Return Visit with Cinda Cazares NP   [facility] Nephrology ([facility])    [address] [place] [address]  [address] [facility] [address]   [phone]            Dec 13, 2017 10:30 AM CST   (Arrive by 10:15 AM)   Return Visit with Sara Dinh MD   [facility] Colon and Rectal Surgery ([facility])    [address] [place] [address]  [address] [facility] [address]    [phone]            Dec 13, 2017 12:30 PM CST   (Arrive by 12:15 PM)   PAC EVALUATION with  Pac Mary 5   [facility] ([facility])    [address] [place] [address]   [phone]            Dec 13, 2017  1:30 PM CST   (Arrive by 1:15 PM)   PAC RN ASSESSMENT with Jonathon Pac Rn   [facility] [place] ([facility])    [address] [place] [address]   [phone]            Dec 13, 2017  2:00 PM CST   (Arrive by 1:45 PM)   PAC Anesthesia Consult with  Pac Anesthesiologist   [facility] [place] ([facility])    [address] [place] [address]   [phone]            Jan 05, 2018   Procedure with Sara Dinh MD   [facility] [place], Same Day Surgery (--)    [address] [facility] [address] [phone]              Future tests that were ordered for you today     Open Standing Orders        Priority Remaining Interval Expires Ordered    Renal panel (Alb, BUN, Ca, Cl, CO2, Creat, Gluc, Phos, K, Na) Routine 99/99  11/1/2018 11/1/2017            Who to contact     If you have questions or need follow up information about today's clinic visit or your schedule please contact [facility] NEPHROLOGY directly at [phone].  Normal or non-critical lab and imaging results will be communicated to you by MyChart, letter or phone within 4 business days after the clinic has received the results. If you do not hear from us within 7 days, please contact the clinic through [employer] or phone. If you have a critical or abnormal lab result, we will notify you by phone as soon as possible.  Submit refill requests through [employer] or call your pharmacy and they will forward the refill request to us. Please allow 3 business days for your refill to be completed.          Additional Information About Your  Visit        [employer] Information     [employer] gives you secure access to your electronic health record. If you see a primary care provider, you can also send messages to your care team and make appointments. If you have questions, please call your primary care clinic.  If you do not have a primary care provider, please call [phone] and they will assist you.        Care EveryWhere ID     This is your Care EveryWhere ID. This could be used by other organizations to access your [facility] medical records  [id number]        Your Vitals Were     Pulse Height Pulse Oximetry BMI (Body Mass Index)          91 1.829 m (6') 100% 25.82 kg/m2         Blood Pressure from Last 3 Encounters:   11/01/17 151/87   10/31/17 150/85   10/27/17 146/80    Weight from Last 3 Encounters:   11/01/17 86.4 kg (190 lb 6.4 oz)   10/27/17 84.2 kg (185 lb 11.2 oz)   10/19/17 85 kg (187 lb 4.8 oz)              We Performed the Following     Magnesium          Today's Medication Changes          These changes are accurate as of: 11/1/17  2:10 PM.  If you have any questions, ask your nurse or doctor.               Start taking these medicines.        Dose/Directions    furosemide 20 MG tablet   Commonly known as:  LASIX   Used for:  Hyperkalemia   Started by:  Cinda Cazares, JUANITO        Dose:  20 mg   Take 1 tablet (20 mg) by mouth 2 times daily   Quantity:  60 tablet   Refills:  3            Where to get your medicines      These medications were sent to [facility] [address] [place] [address] AT [facility] [address]  [address] [place] [address]     Phone:  [phone]     furosemide 20 MG tablet                Primary Care Provider    Shay Kirkpatrick MD       PWB  MD [address]        Equal Access to Services     JOSE PORTILLO AH: Hadii tavo duttono Sojose, waaxda luqadaha, qaybta kaalmada gilbertoyamachelle, devi duckworth. So [facility] [phone].    ATENCIÓN: Si yaniv latif zheng  disposición servicios gratuitos de asistencia lingüística. Shahla montemayor [phone].    We comply with applicable federal civil rights laws and [place] laws. We do not discriminate on the basis of race, color, national origin, age, disability, sex, sexual orientation, or gender identity.            Thank you!     Thank you for choosing [facility] NEPHROLOGY  for your care. Our goal is always to provide you with excellent care. Hearing back from our patients is one way we can continue to improve our services. Please take a few minutes to complete the written survey that you may receive in the mail after your visit with us. Thank you!             Your Updated Medication List - Protect others around you: Learn how to safely use, store and throw away your medicines at www.disposemymeds.org.          This list is accurate as of: 11/1/17  2:10 PM.  Always use your most recent med list.                   Brand Name Dispense Instructions for use Diagnosis    amLODIPine 5 MG tablet    NORVASC    60 tablet    Take 2 tablets (10 mg) by mouth daily    History of liver transplant (H), Elevated blood pressure reading, Long-term use of immunosuppressant medication       CIALIS 5 MG tablet   Generic drug:  tadalafil           cyclobenzaprine 10 MG tablet    FLEXERIL    90 tablet    Take 1 tablet (10 mg) by mouth 3 times daily as needed for muscle spasms    Immunosuppression (H)       fluconazole 200 MG tablet    DIFLUCAN    30 tablet    Take 1 tablet (200 mg) by mouth daily    History of liver transplant (H), Long-term use of immunosuppressant medication       fludrocortisone 0.1 MG tablet    FLORINEF    90 tablet    Take 3 tablets (0.3 mg) by mouth daily    Hyperkalemia       furosemide 20 MG tablet    LASIX    60 tablet    Take 1 tablet (20 mg) by mouth 2 times daily    Hyperkalemia       GABAPENTIN PO      Take 300 mg by mouth 3 times daily        insulin glargine 100 UNIT/ML injection    LANTUS     Inject 50 Units Subcutaneous  every morning        linagliptin 5 MG Tabs tablet    TRADJENTA     Take 5 mg by mouth daily        loperamide 2 MG capsule    IMODIUM    120 capsule    Take 2 capsules (4 mg) by mouth 2 times daily    S/P right hemicolectomy       mycophenolic acid 360 MG EC tablet    GENERIC EQUIVALENT    60 tablet    Take 1 tablet (360 mg) by mouth every 12 hours    History of liver transplant (H), Long-term use of immunosuppressant medication       OMEPRAZOLE PO      Take 20 mg by mouth every morning        oxyCODONE 10 MG IR tablet    ROXICODONE     TK 1 T PO D AS NEEDED FOR MODERATE TO SEVERE PAIN        sodium bicarbonate 650 MG tablet     180 tablet    Take 2 tablets (1,300 mg) by mouth 3 times daily    Hyperkalemia       sodium chloride (PF) 0.9% PF flush     600 mL    Flush 12 french drain with 10mls NS BID, flush 24 Fr drain with 20mls NS BID    Intra-abdominal abscess (H)       tacrolimus 1 MG capsule    GENERIC EQUIVALENT    60 capsule    Take 4 capsules (4 mg) by mouth every 12 hours    Liver transplant recipient (H)       VELTASSA 8.4 G packet   Generic drug:  patiromer     30 each    Take 8.4 g by mouth daily    Hyperkalemia

## 2017-11-01 NOTE — LETTER
11/1/2017      RE: Camacho Bhagat  [address]  [facility] [address]       Nephrology Clinic Visit 11/1/17    Assessment and Plan:     1. EJ- Patient has had steady rise in creat since 9/17. Previously baseline was ~1.0, since that time averages in the 1.6 and since 10/26 around 2.0.   Etiology of EJ is likely multifactorial: CNI, recurrent EJ, prolonged pre renal state from colostomy output.    - Tacro being discontinued by Transplant   - Remain well hydrated and avoid hypotension   - Does not use NSAIDs    2. CKD3b w/proteinuria - Likely multifactorial; DM, recurrent EJ, CNI   - Patient wondering if he will need a kidney transplant   - Avoid EJ if possible   - A1c goal is 7%. Patient reports BS < 180   - B/P goal is < 140/80. Home readings when not in pain ~ 120/ range    3. HTN - Patient reports back pain today in clinic so elevated blood pressures may be pain related. Pt reports home blood pressures in the 120'/s. He does have mild edema. Only taking Lasix 20 mg qd. On Florinef 0.3 mg qd. Amlodipine 10 mg qd.    - Increase lasix to 20 mg bid   - Patient to bring in blood pressure machine next visit    4. Hyperkalemia - K 6.4 today. Pt has the new Rx for Valtassa at home but just hasn't yet taken it.    - Take Valtassa tonight    - Transplant is tapering off Tacro   - Repeat labs on Friday    5. Volume status - Mild hypervolemia. No dyspnea. No increase in stool output. Voiding w/o difficulty. Weight today 190.4#, up 5 # from 10/27/17   - Increase Lasix to 20 mg bid    6. Acid base - Bicarb 17 in setting of diarrhea stools, CKD. Currently on Bicarb 1300 mg TID   - Continue current dosing    7. BMD - Ca 8.4, Phos 3.8, albumin 3.1   - Vitamin D 20 and PTH 34 (9/14/17)   - Will begin Vit D 2000 U next visit    8. Hypomagnesemia - Mag 1.4   - Begin Mag oxide 400 mg qd    9. Anemia - Hgb 8.5. Etiology felt to be 2/2 CKD and decreased absorption 2/2 bowel resection   - Iron studies 9/17: Fe 39, ferritin  2947, tsat 22%   - Last RBC 10/24/17   - Hemolysis w/u neg during hosp admisssion   - Given Aranesp 25 mcg x 1 on 10/27/17   - Will refer to anemia management for DIPAK    10. Liver transplant IS: Tacrolimus, MMF, Pred   - Tacro being taper off per Transplant    11. Dispo- RCT 1 month    Assessment and plan was discussed with patient and he voiced his understanding and agreement.    Reason for Visit:  Post hospital EJ/Hyperkalemia f/u     HPI:  Mr Bhagat is a 51 yo male in today for post hospital EJ/hyperkalemia f/u. He was admitted to hospital 10/24/17 for recurrent hyperkalemia. Was resistant to efforts including shifting and increase in Florinef. He was prescribed Valtassa but has not yet taken. Transplant has decided to discontinue Tacrolimus in effort to control his potassium.     Baseline Cr: 1.8-1.9 range    ROS:  Feeling well. Reports improved appetite. Denies CP, dyspnea, abdominal pain, No voiding issues. Walks his dog daily. Empties his colostomy bag about 4 times/daily at baseline. Home blood pressues 120's/ when not in pain.     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 Former smoker.     Allergies:  Allergies   Allergen Reactions     Erythromycin GI Disturbance     Vioxx      Nausea, vomiting       Medications:  Prior to Admission medications    Medication Sig Start Date End Date Taking? Authorizing Provider   furosemide (LASIX) 20 MG tablet Take 1 tablet (20 mg) by mouth 2 times daily 11/1/17  Yes DeboraCinda NP   patiromer (VELTASSA) 8.4 G packet Take 8.4 g by mouth daily 10/30/17  Yes Ninfa Hernández MD   sodium bicarbonate 650 MG tablet Take 2 tablets (1,300 mg) by mouth 3  times daily 10/27/17  Yes Sherry Albert MD   fludrocortisone (FLORINEF) 0.1 MG tablet Take 3 tablets (0.3 mg) by mouth daily 10/28/17  Yes Sherry Albert MD   fluconazole (DIFLUCAN) 200 MG tablet Take 1 tablet (200 mg) by mouth daily 10/23/17  Yes Jovan Tran MD   OMEPRAZOLE PO Take 20 mg by mouth every morning    Yes Reported, Patient   GABAPENTIN PO Take 300 mg by mouth 3 times daily   Yes Reported, Patient   insulin glargine (LANTUS) 100 UNIT/ML injection Inject 50 Units Subcutaneous every morning   Yes Reported, Patient   linagliptin (TRADJENTA) 5 MG TABS tablet Take 5 mg by mouth daily   Yes Reported, Patient   sodium chloride, PF, 0.9% PF flush Flush 12 french drain with 10mls NS BID, flush 24 Fr drain with 20mls NS BID 10/3/17  Yes Jovan Tran MD   loperamide (IMODIUM) 2 MG capsule Take 2 capsules (4 mg) by mouth 2 times daily 9/8/17  Yes Felicia Tolliver APRN CNP   amLODIPine (NORVASC) 5 MG tablet Take 2 tablets (10 mg) by mouth daily 9/8/17  Yes Felicia Tolliver APRN CNP   cyclobenzaprine (FLEXERIL) 10 MG tablet Take 1 tablet (10 mg) by mouth 3 times daily as needed for muscle spasms 9/8/17  Yes Felicia Tolliver APRN CNP   oxyCODONE (ROXICODONE) 10 MG IR tablet TK 1 T PO D AS NEEDED FOR MODERATE TO SEVERE PAIN 6/13/17   Reported, Patient   CIALIS 5 MG tablet  6/7/17   Reported, Patient   predniSONE (DELTASONE) 10 MG tablet Take 1 tablet (10 mg) by mouth daily 11/1/17   Jovan Tran MD   tacrolimus (GENERIC EQUIVALENT) 1 MG capsule Take 2 capsules (2 mg) by mouth every 12 hours 11/1/17   Jovan Tran MD   mycophenolic acid (GENERIC EQUIVALENT) 360 MG EC tablet Take 1 tablet (360 mg) by mouth every 6 hours 11/1/17   Jovan Tran MD   Mag oxide 400 mg po qd    Vitals:  /87  Pulse 91  Ht 1.829 m (6')  Wt 86.4 kg (190 lb 6.4 oz)  SpO2 100%  BMI 25.82 kg/m2    Exam:  Gen: Calm, pleasant and interactive  CARDIAC: Tachy  LUNGS: CTA  ABDOMEN:  Colostomy present, Abd NT  EXT: Trace edema    Results:  Results for RONNIE ARIZMENDI (MRN [id number]) as of 11/2/2017 21:48   Ref. Range 11/1/2017 11:30   Sodium Latest Ref Range: 133 - 144 mmol/L 136   Potassium Latest Ref Range: 3.4 - 5.3 mmol/L 6.4 (HH)   Chloride Latest Ref Range: 94 - 109 mmol/L 111 (H)   Carbon Dioxide Latest Ref Range: 20 - 32 mmol/L 17 (L)   Urea Nitrogen Latest Ref Range: 7 - 30 mg/dL 48 (H)   Creatinine Latest Ref Range: 0.66 - 1.25 mg/dL 2.13 (H)   GFR Estimate Latest Ref Range: >60 mL/min/1.7m2 33 (L)   GFR Estimate If Black Latest Ref Range: >60 mL/min/1.7m2 40 (L)   Calcium Latest Ref Range: 8.5 - 10.1 mg/dL 8.4 (L)   Anion Gap Latest Ref Range: 3 - 14 mmol/L 8   Magnesium Latest Ref Range: 1.6 - 2.3 mg/dL 1.4 (L)   Phosphorus Latest Ref Range: 2.5 - 4.5 mg/dL 3.8   Albumin Latest Ref Range: 3.4 - 5.0 g/dL 3.1 (L)   Glucose Latest Ref Range: 70 - 99 mg/dL 123 (H)   Hemoglobin Latest Ref Range: 13.3 - 17.7 g/dL 8.5 (L)     Cinda Cazares, NP

## 2017-11-01 NOTE — PATIENT INSTRUCTIONS
1. Continue lasix 20 mg bid  2. Take the Valtassa daily beginning today  3. Have labs checked Friday  4. RTC in one month  5. Bring in blood pressure machine and readings next visit

## 2017-11-02 NOTE — TELEPHONE ENCOUNTER
APPT INFO    Date /Time: 11/3/17- 11 AM    Reason for Appt: Ischemia of digits of hand   Ref Provider/Clinic: Estefani Beal MD    Are there internal records? If yes, list: -[facility] Vascular Surgery - 10/23/17   -[facility] Transplant    Patient Contact (Y/N) & Call Details: No - all records are in Epic.    Action: Closing encounter.

## 2017-11-03 ENCOUNTER — TELEPHONE (OUTPATIENT)
Dept: TRANSPLANT | Facility: CLINIC | Age: 52
End: 2017-11-03

## 2017-11-03 ENCOUNTER — PRE VISIT (OUTPATIENT)
Dept: ORTHOPEDICS | Facility: CLINIC | Age: 52
End: 2017-11-03

## 2017-11-03 ENCOUNTER — OFFICE VISIT (OUTPATIENT)
Dept: ORTHOPEDICS | Facility: CLINIC | Age: 52
End: 2017-11-03

## 2017-11-03 VITALS — HEIGHT: 72 IN | BODY MASS INDEX: 25.8 KG/M2 | WEIGHT: 190.48 LBS

## 2017-11-03 DIAGNOSIS — Z94.4 HISTORY OF LIVER TRANSPLANT (H): ICD-10-CM

## 2017-11-03 DIAGNOSIS — I99.8 ISCHEMIC FINGER: Primary | ICD-10-CM

## 2017-11-03 DIAGNOSIS — N18.4 CHRONIC KIDNEY DISEASE, STAGE 4 (SEVERE) (H): ICD-10-CM

## 2017-11-03 DIAGNOSIS — Z79.60 LONG-TERM USE OF IMMUNOSUPPRESSANT MEDICATION: ICD-10-CM

## 2017-11-03 DIAGNOSIS — Z94.4 HISTORY OF LIVER TRANSPLANT (H): Primary | ICD-10-CM

## 2017-11-03 DIAGNOSIS — D72.819 DECREASED WHITE BLOOD CELL COUNT: ICD-10-CM

## 2017-11-03 LAB
ALBUMIN SERPL-MCNC: 3.1 G/DL (ref 3.4–5)
ALP SERPL-CCNC: 490 U/L (ref 40–150)
ALT SERPL W P-5'-P-CCNC: 37 U/L (ref 0–70)
ANION GAP SERPL CALCULATED.3IONS-SCNC: 9 MMOL/L (ref 3–14)
AST SERPL W P-5'-P-CCNC: 52 U/L (ref 0–45)
BASOPHILS # BLD AUTO: 0 10E9/L (ref 0–0.2)
BASOPHILS NFR BLD AUTO: 0.2 %
BILIRUB DIRECT SERPL-MCNC: 0.2 MG/DL (ref 0–0.2)
BILIRUB SERPL-MCNC: 0.5 MG/DL (ref 0.2–1.3)
BUN SERPL-MCNC: 51 MG/DL (ref 7–30)
CALCIUM SERPL-MCNC: 8.4 MG/DL (ref 8.5–10.1)
CHLORIDE SERPL-SCNC: 109 MMOL/L (ref 94–109)
CO2 SERPL-SCNC: 17 MMOL/L (ref 20–32)
CREAT SERPL-MCNC: 2.03 MG/DL (ref 0.66–1.25)
DIFFERENTIAL METHOD BLD: NORMAL
EOSINOPHIL # BLD AUTO: 0 10E9/L (ref 0–0.7)
EOSINOPHIL NFR BLD AUTO: 0.4 %
ERYTHROCYTE [DISTWIDTH] IN BLOOD BY AUTOMATED COUNT: 16.2 % (ref 10–15)
GFR SERPL CREATININE-BSD FRML MDRD: 35 ML/MIN/1.7M2
GLUCOSE SERPL-MCNC: 192 MG/DL (ref 70–99)
HCT VFR BLD AUTO: 25 % (ref 40–53)
HGB BLD-MCNC: 8 G/DL (ref 13.3–17.7)
IMM GRANULOCYTES # BLD: 0 10E9/L (ref 0–0.4)
IMM GRANULOCYTES NFR BLD: 0.5 %
LYMPHOCYTES # BLD AUTO: 1.6 10E9/L (ref 0.8–5.3)
LYMPHOCYTES NFR BLD AUTO: 28.9 %
MAGNESIUM SERPL-MCNC: 1.3 MG/DL (ref 1.6–2.3)
MCH RBC QN AUTO: 30.1 PG (ref 26.5–33)
MCHC RBC AUTO-ENTMCNC: 32 G/DL (ref 31.5–36.5)
MCV RBC AUTO: 94 FL (ref 78–100)
MONOCYTES # BLD AUTO: 0.2 10E9/L (ref 0–1.3)
MONOCYTES NFR BLD AUTO: 4.3 %
NEUTROPHILS # BLD AUTO: 3.7 10E9/L (ref 1.6–8.3)
NEUTROPHILS NFR BLD AUTO: 65.7 %
NRBC # BLD AUTO: 0 10*3/UL
NRBC BLD AUTO-RTO: 0 /100
P JIROVECII DNA SPEC QL NAA+PROBE: NOT DETECTED
PHOSPHATE SERPL-MCNC: 3.8 MG/DL (ref 2.5–4.5)
PLATELET # BLD AUTO: 78 10E9/L (ref 150–450)
POTASSIUM SERPL-SCNC: 6.4 MMOL/L (ref 3.4–5.3)
PROT SERPL-MCNC: 7.6 G/DL (ref 6.8–8.8)
RBC # BLD AUTO: 2.66 10E12/L (ref 4.4–5.9)
SODIUM SERPL-SCNC: 136 MMOL/L (ref 133–144)
SPECIMEN SOURCE: NORMAL
WBC # BLD AUTO: 5.6 10E9/L (ref 4–11)

## 2017-11-03 NOTE — TELEPHONE ENCOUNTER
Spoke with Camacho about potassium of 6.4,  He is taking veltassa, twice a day, just started it, prescribed by  to decrease potassium.   Plan to recheck labs on Monday 11/06.   Camacho reports that he feels fine, did see the surgeon today and will be having surgery on his right forefinger on Monday in the surgery center at the [facility].   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

## 2017-11-03 NOTE — MR AVS SNAPSHOT
After Visit Summary   11/3/2017    Camacho Bhagat    MRN: [id number]           Patient Information     Date Of Birth          1965        Visit Information        Provider Department      11/3/2017 11:00 AM Momo Noonan MD [facility]        Today's Diagnoses     Ischemic finger    -  1       Follow-ups after your visit        Your next 10 appointments already scheduled     Nov 10, 2017   Procedure with Momo Noonan MD   [facility] ([facility])    [address] [place] [address]  5th [facility] [address] [phone]           Located in the Clinics and Surgery Center at [address] [place] [address].   parking is very convenient and highly recommended.  is a $6 flat rate fee.  Both  and self parkers should enter the main arrival plaza from [facility]; parking attendants will direct you based on your parking preference.            Nov 17, 2017 11:40 AM CST   (Arrive by 11:25 AM)   POST-OP HAND with Momo Noonan MD   [facility] ([facility])    [address] [place] [address]  4th [facility] [address] [phone]            Nov 20, 2017  2:45 PM CST   (Arrive by 2:30 PM)   Return Vascular Visit with Estefani Beal MD   [facility] ([facility])    [address] [place] [address]  3rd [facility] [address] [phone]            Dec 01, 2017  8:45 AM CST   (Arrive by 8:30 AM)   Return Liver Transplant with Toñito Camejo MD   [facility] ([facility])    [address] [place] [address]  3rd [facility] [address]   [phone]            Dec 06, 2017 11:30 AM CST   Lab with  LAB   [facility] ([facility])    [address] [place] [address]  1st [facility] [address] [phone]            Dec 06, 2017  1:00 PM CST   (Arrive by  12:30 PM)   Return Visit with Cinda Cazares NP   [facility] ([facility])    [address]  3rd Floor  [facility] [address]   [phone]            Dec 13, 2017 10:30 AM CST   (Arrive by 10:15 AM)   Return Visit with Sara Dinh MD   [facility] and Rectal Surgery ([facility])    [address]  4th Floor  [facility] [address]   [phone]            Dec 13, 2017 12:30 PM CST   (Arrive by 12:15 PM)   PAC EVALUATION with Uc Pac Mary 5   [facility] Preoperative [facility] [place] ([facility])    [address]  4th Floor  [facility] [address] [phone]            Dec 13, 2017  1:30 PM CST   (Arrive by 1:15 PM)   PAC RN ASSESSMENT with Uc Pac Rn   [facility] ([facility])    [address]  4th [facility] [address]   [phone]            Dec 13, 2017  2:00 PM CST   (Arrive by 1:45 PM)   PAC Anesthesia Consult with  Pac Anesthesiologist   [facility] ([facility])    [address]  4th [facility] [address] [phone]              Future tests that were ordered for you today     Open Standing Orders        Priority Remaining Interval Expires Ordered    Hemoglobin and hematocrit Routine 99/99 weekly 11/7/2018 11/8/2017    Iron and iron binding capacity Routine 15/15 every 4 weeks 11/7/2018 11/8/2017    Ferritin Routine 15/15 every 4 weeks 11/7/2018 11/8/2017            Who to contact     Please call your clinic at [phone] to:    Ask questions about your health    Make or cancel appointments    Discuss your medicines    Learn about your test results    Speak to your doctor   If you have compliments or concerns about an experience at your clinic, or if you wish to file a complaint, please contact [facility]  "Physicians Patient Relations at [phone] or email us at [email or web address]         Additional Information About Your Visit        [employer] Information     [employer] gives you secure access to your electronic health record. If you see a primary care provider, you can also send messages to your care team and make appointments. If you have questions, please call your primary care clinic.  If you do not have a primary care provider, please call [phone] and they will assist you.      [employer] is an electronic gateway that provides easy, online access to your medical records. With [employer], you can request a clinic appointment, read your test results, renew a prescription or communicate with your care team.     To access your existing account, please contact your [facility] or call [phone] for assistance.        Care EveryWhere ID     This is your Care EveryWhere ID. This could be used by other organizations to access your [place] medical records  [id number]        Your Vitals Were     Height BMI (Body Mass Index)                1.829 m (6' 0.01\") 25.83 kg/m2           Blood Pressure from Last 3 Encounters:   11/07/17 (!) 159/91   11/01/17 151/87   10/31/17 150/85    Weight from Last 3 Encounters:   11/07/17 82.2 kg (181 lb 3.2 oz)   11/03/17 86.4 kg (190 lb 7.6 oz)   11/01/17 86.4 kg (190 lb 6.4 oz)              We Performed the Following     Jami-Operative Worksheet (Hand)        Primary Care Provider    Shay Kirkpatrick MD       [facility] [id number]        Equal Access to Services     [facility]: Hadii aad ku hadasho Soomaali, waaxda luqadaha, qaybta kaalmada adeegyamachelle, devi craig . So [facility] [phone].    ATENCIÓN: Si habla español, tiene a zheng disposición servicios gratuitos de asistencia lingüística. Llame al [phone].    We comply with applicable federal civil rights laws and [place] laws. We do not discriminate on the basis of " race, color, national origin, age, disability, sex, sexual orientation, or gender identity.            Thank you!     Thank you for choosing [facility]  for your care. Our goal is always to provide you with excellent care. Hearing back from our patients is one way we can continue to improve our services. Please take a few minutes to complete the written survey that you may receive in the mail after your visit with us. Thank you!             Your Updated Medication List - Protect others around you: Learn how to safely use, store and throw away your medicines at www.disposemymeds.org.          This list is accurate as of: 11/3/17 11:59 PM.  Always use your most recent med list.                   Brand Name Dispense Instructions for use Diagnosis    CIALIS 5 MG tablet   Generic drug:  tadalafil      Take 5 mg by mouth as needed        cyclobenzaprine 10 MG tablet    FLEXERIL    90 tablet    Take 1 tablet (10 mg) by mouth 3 times daily as needed for muscle spasms    Immunosuppression (H)       furosemide 20 MG tablet    LASIX    60 tablet    Take 1 tablet (20 mg) by mouth 2 times daily    Hyperkalemia       GABAPENTIN PO      Take 300 mg by mouth 3 times daily        insulin glargine 100 UNIT/ML injection    LANTUS     Inject 50 Units Subcutaneous every morning        linagliptin 5 MG Tabs tablet    TRADJENTA     Take 5 mg by mouth every evening        loperamide 2 MG capsule    IMODIUM    120 capsule    Take 2 capsules (4 mg) by mouth 2 times daily    S/P right hemicolectomy       mycophenolic acid 360 MG EC tablet    GENERIC EQUIVALENT    120 tablet    Take 1 tablet (360 mg) by mouth every 6 hours    History of liver transplant (H), Long-term use of immunosuppressant medication       OMEPRAZOLE PO      Take 20 mg by mouth every morning        predniSONE 10 MG tablet    DELTASONE    30 tablet    Take 1 tablet (10 mg) by mouth daily    Liver transplant recipient (H), Long-term use of immunosuppressant  medication       sodium bicarbonate 650 MG tablet     180 tablet    Take 2 tablets (1,300 mg) by mouth 3 times daily    Hyperkalemia       tacrolimus 1 MG capsule    GENERIC EQUIVALENT    60 capsule    Take 2 capsules (2 mg) by mouth every 12 hours    Liver transplant recipient (H)       VELTASSA 8.4 G packet   Generic drug:  patiromer     30 each    Take 8.4 g by mouth daily    Hyperkalemia

## 2017-11-03 NOTE — LETTER
11/3/2017       RE: Camacho Bhagat  [address] [facility] [address]     Dear Colleague,    Thank you for referring your patient, Camacho Bhagat, to the [facility] at [facility]. Please see a copy of my visit note below.    CHIEF COMPLAINT:  Right index finger ischemia.      HISTORY OF PRESENT ILLNESS:  Camacho Bhagat is a 52-year-old male with multiple medical comorbidities including nonalcoholic steatohepatitis with subsequent cirrhosis, who is status post liver transplant in 03/2017.  Unfortunately, he was admitted to the hospital in 07/2017 for sepsis secondary to typhlitis and required support of vasopressors and subsequently developed right index finger ischemia and ischemia in his toes.  Vascular Surgery is seeing him and taking care of his toes and he was referred to us for management of his right necrotic index finger.  He states that it is very tender, especially when he bumps it or uses it to pinch anything.  He notes that intermittently gets more red and painful and there is intermittent drainage.  He would like to discuss treatment for this.      PAST MEDICAL HISTORY:   1.  Esophageal reflux.   2.  History of DVT, status post IVC filter.   3.  Nonalcoholic steatohepatitis with subsequent cirrhosis.   4.  Rheumatoid arthritis.   5.  Type 2 diabetes.   6.  Hypertension.   7.  Sleep apnea.      PAST SURGICAL HISTORY:   1.  Liver transplant in 03/2017.   2.  Right knee arthroscopy in 2008.   3.  Multiple exploratory laparotomies during 07/2017 due to necrotic bowel.      SOCIAL HISTORY:  He is , works as an  and is a former smoker and quit in 10/2015.  He denies alcohol use and quit in 2001.  Denies illicit drug use.      MEDICATIONS:  Please see updated medication list in Epic, which was reviewed.      ALLERGIES:   1.  Erythromycin.   2.  Vioxx.   Both cause nausea and vomiting.      REVIEW OF SYSTEMS:  A 10-point review of  systems is negative except for those things noted in the history of present illness and the intake form review of systems.      PHYSICAL EXAMINATION:   GENERAL:  Well developed, well-nourished male in no acute distress.   RESPIRATORY:  Nonlabored breathing on room air.   ABDOMEN:  Nondistended.   FOCUSED EXAM:  Of the right upper extremity reveals all digits except the index finger warm and well perfused with brisk capillary refill and sensation intact to light touch in all median, radial and ulnar nerve distributions.  The index finger has a necrotic tip below the nail.  There is some reactive erythema surrounding this, but no neida signs of infection.  There is no drainage.  The necrotic area of tissue is very well adherent to the finger and cannot easily be unroofed.  This is very tender to palpation.      IMAGING:  None.      ASSESSMENT:  52-year-old male with multiple medical comorbidities including mass and secondary cirrhosis who was recently hospitalized 07/2017 for sepsis and necrotic bowel resulting in vasopressor support and subsequent ischemia to his digits.  Currently with right necrotic index finger tip.      PLAN:  We counseled Mr. Bhagat that he will benefit from a scar debridement.  At the time of surgery, we will assess the wound bed and if it is well-appearing, we will either let things heal by secondary intention or close it primarily.  However, if it appears more of the bone is involved or there is any infection present, we could perform a revision amputation.  He was counseled that most likely we will be able to perform a V-Y advancement flap to cover the area of the revision amputation site, but if the tissue appears poor, it is possible that he may need a distal phalanx amputation.  We counseled him on the risks, benefits and alternatives to both operative and nonoperative treatment and he agrees to proceed, and we will work on scheduling this in 1 week.      The patient seen and examined with  Dr. Momo Noonan who agrees with the above stated assessment and plan.     Sincerely,    Momo Noonan MD

## 2017-11-03 NOTE — NURSING NOTE
"Reason For Visit:   Chief Complaint   Patient presents with     Consult     Right index finger ischemia       Primary MD: Shay Kirkpatrick  Ref. MD: PRACHI FITZGERALD    Age: 52 year old    ?  No      Ht 1.829 m (6' 0.01\")  Wt 86.4 kg (190 lb 7.6 oz)  BMI 25.83 kg/m2      Pain Assessment  Patient Currently in Pain: Yes  0-10 Pain Scale:  (7-9)  Primary Pain Location: Hand (Index finger)  Pain Orientation: Right  Pain Descriptors: Sharp  Aggravating Factors: Movement    Hand Dominance Evaluation  Hand Dominance: Right          QuickDASH Assessment  QuickDASH Main 11/2/2017   1.Open a tight or new jar. No difficulty   2. Do heavy household chores (e.g., wash walls, floors) Unable to answer   3. Carry a shopping bag or briefcase. Unable to answer   4. Wash your back. Unable to answer   5. Use a knife to cut food. No difficulty   6. Recreational activities in which you take some force or impact through your arm, shoulder or hand (e.g., golf, hammering, tennis, etc.). No difficulty   7. During the past week, to what extent has your arm, shoulder or hand problem interfered with your normal social activities with family, friends, neighbours or groups? Slightly   8. During the past week, were you limited in your work or other regular daily activities as a result of your arm, shoulder or hand problem? Slightly limited   9. Arm, shoulder or hand pain. Severe   10.Tingling (pins and needles) in your arm,shoulder or hand. None   11. During the past week, how much difficulty have you had sleeping because of the pain in your arm, shoulder or hand? ([place] number) No difficulty   Quickdash Ability Score 4.54          Current Outpatient Prescriptions   Medication Sig Dispense Refill     oxyCODONE (ROXICODONE) 10 MG IR tablet TK 1 T PO D AS NEEDED FOR MODERATE TO SEVERE PAIN  0     CIALIS 5 MG tablet   1     furosemide (LASIX) 20 MG tablet Take 1 tablet (20 mg) by mouth 2 times daily 60 tablet 3     predniSONE " (DELTASONE) 10 MG tablet Take 1 tablet (10 mg) by mouth daily 30 tablet 11     tacrolimus (GENERIC EQUIVALENT) 1 MG capsule Take 2 capsules (2 mg) by mouth every 12 hours 60 capsule 11     mycophenolic acid (GENERIC EQUIVALENT) 360 MG EC tablet Take 1 tablet (360 mg) by mouth every 6 hours 120 tablet 5     patiromer (VELTASSA) 8.4 G packet Take 8.4 g by mouth daily 30 each 3     sodium bicarbonate 650 MG tablet Take 2 tablets (1,300 mg) by mouth 3 times daily 180 tablet 3     fludrocortisone (FLORINEF) 0.1 MG tablet Take 3 tablets (0.3 mg) by mouth daily 90 tablet 3     fluconazole (DIFLUCAN) 200 MG tablet Take 1 tablet (200 mg) by mouth daily 30 tablet 5     OMEPRAZOLE PO Take 20 mg by mouth every morning        GABAPENTIN PO Take 300 mg by mouth 3 times daily       insulin glargine (LANTUS) 100 UNIT/ML injection Inject 50 Units Subcutaneous every morning       linagliptin (TRADJENTA) 5 MG TABS tablet Take 5 mg by mouth daily       sodium chloride, PF, 0.9% PF flush Flush 12 french drain with 10mls NS BID, flush 24 Fr drain with 20mls NS  mL 3     loperamide (IMODIUM) 2 MG capsule Take 2 capsules (4 mg) by mouth 2 times daily 120 capsule 3     amLODIPine (NORVASC) 5 MG tablet Take 2 tablets (10 mg) by mouth daily 60 tablet 3     cyclobenzaprine (FLEXERIL) 10 MG tablet Take 1 tablet (10 mg) by mouth 3 times daily as needed for muscle spasms 90 tablet 0       Allergies   Allergen Reactions     Erythromycin GI Disturbance     Vioxx      Nausea, vomiting       Kathy Andrew, ATC

## 2017-11-03 NOTE — PROGRESS NOTES
Nephrology Clinic Visit 11/1/17    Assessment and Plan:     1. EJ- Patient has had steady rise in creat since 9/17. Previously baseline was ~1.0, since that time averages in the 1.6 and since 10/26 around 2.0.   Etiology of EJ is likely multifactorial: CNI, recurrent EJ, prolonged pre renal state from colostomy output.    - Tacro being discontinued by Transplant   - Remain well hydrated and avoid hypotension   - Does not use NSAIDs    2. CKD3b w/proteinuria - Likely multifactorial; DM, recurrent EJ, CNI   - Patient wondering if he will need a kidney transplant   - Avoid EJ if possible   - A1c goal is 7%. Patient reports BS < 180   - B/P goal is < 140/80. Home readings when not in pain ~ 120/ range    3. HTN - Patient reports back pain today in clinic so elevated blood pressures may be pain related. Pt reports home blood pressures in the 120'/s. He does have mild edema. Only taking Lasix 20 mg qd. On Florinef 0.3 mg qd. Amlodipine 10 mg qd.    - Increase lasix to 20 mg bid   - Patient to bring in blood pressure machine next visit    4. Hyperkalemia - K 6.4 today. Pt has the new Rx for Valtassa at home but just hasn't yet taken it.    - Take Valtassa tonight    - Transplant is tapering off Tacro   - Repeat labs on Friday    5. Volume status - Mild hypervolemia. No dyspnea. No increase in stool output. Voiding w/o difficulty. Weight today 190.4#, up 5 # from 10/27/17   - Increase Lasix to 20 mg bid    6. Acid base - Bicarb 17 in setting of diarrhea stools, CKD. Currently on Bicarb 1300 mg TID   - Continue current dosing    7. BMD - Ca 8.4, Phos 3.8, albumin 3.1   - Vitamin D 20 and PTH 34 (9/14/17)   - Will begin Vit D 2000 U next visit    8. Hypomagnesemia - Mag 1.4   - Begin Mag oxide 400 mg qd    9. Anemia - Hgb 8.5. Etiology felt to be 2/2 CKD and decreased absorption 2/2 bowel resection   - Iron studies 9/17: Fe 39, ferritin 2947, tsat 22%   - Last RBC 10/24/17   - Hemolysis w/u neg during hosp admisssion   -  Given Aranesp 25 mcg x 1 on 10/27/17   - Will refer to anemia management for DIPAK    10. Liver transplant IS: Tacrolimus, MMF, Pred   - Tacro being taper off per Transplant    11. Dispo- RCT 1 month    Assessment and plan was discussed with patient and he voiced his understanding and agreement.    Reason for Visit:  Post hospital EJ/Hyperkalemia f/u     HPI:  Mr Bhagat is a 53 yo male in today for post hospital EJ/hyperkalemia f/u. He was admitted to hospital 10/24/17 for recurrent hyperkalemia. Was resistant to efforts including shifting and increase in Florinef. He was prescribed Valtassa but has not yet taken. Transplant has decided to discontinue Tacrolimus in effort to control his potassium.     Baseline Cr: 1.8-1.9 range    ROS:  Feeling well. Reports improved appetite. Denies CP, dyspnea, abdominal pain, No voiding issues. Walks his dog daily. Empties his colostomy bag about 4 times/daily at baseline. Home blood pressues 120's/ when not in pain.     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 Former smoker.     Allergies:  Allergies   Allergen Reactions     Erythromycin GI Disturbance     Vioxx      Nausea, vomiting       Medications:  Prior to Admission medications    Medication Sig Start Date End Date Taking? Authorizing Provider   furosemide (LASIX) 20 MG tablet Take 1 tablet (20 mg) by mouth 2 times daily 11/1/17  Yes Cinda Cazares NP   patiromer (VELTASSA) 8.4 G packet Take 8.4 g by mouth daily 10/30/17  Yes Ninfa Hernández MD   sodium bicarbonate 650 MG tablet Take 2 tablets (1,300 mg) by mouth 3 times daily 10/27/17  Yes Sherry Albert MD   fludrocortisone (FLORINEF) 0.1 MG  tablet Take 3 tablets (0.3 mg) by mouth daily 10/28/17  Yes Sherry Albert MD   fluconazole (DIFLUCAN) 200 MG tablet Take 1 tablet (200 mg) by mouth daily 10/23/17  Yes Jovan Tran MD   OMEPRAZOLE PO Take 20 mg by mouth every morning    Yes Reported, Patient   GABAPENTIN PO Take 300 mg by mouth 3 times daily   Yes Reported, Patient   insulin glargine (LANTUS) 100 UNIT/ML injection Inject 50 Units Subcutaneous every morning   Yes Reported, Patient   linagliptin (TRADJENTA) 5 MG TABS tablet Take 5 mg by mouth daily   Yes Reported, Patient   sodium chloride, PF, 0.9% PF flush Flush 12 french drain with 10mls NS BID, flush 24 Fr drain with 20mls NS BID 10/3/17  Yes Jovan Tran MD   loperamide (IMODIUM) 2 MG capsule Take 2 capsules (4 mg) by mouth 2 times daily 9/8/17  Yes Felicia Tolliver APRN CNP   amLODIPine (NORVASC) 5 MG tablet Take 2 tablets (10 mg) by mouth daily 9/8/17  Yes Felicia Tolliver APRN CNP   cyclobenzaprine (FLEXERIL) 10 MG tablet Take 1 tablet (10 mg) by mouth 3 times daily as needed for muscle spasms 9/8/17  Yes Felicia Tolliver APRN CNP   oxyCODONE (ROXICODONE) 10 MG IR tablet TK 1 T PO D AS NEEDED FOR MODERATE TO SEVERE PAIN 6/13/17   Reported, Patient   CIALIS 5 MG tablet  6/7/17   Reported, Patient   predniSONE (DELTASONE) 10 MG tablet Take 1 tablet (10 mg) by mouth daily 11/1/17   Jovan Tran MD   tacrolimus (GENERIC EQUIVALENT) 1 MG capsule Take 2 capsules (2 mg) by mouth every 12 hours 11/1/17   Jovan Tran MD   mycophenolic acid (GENERIC EQUIVALENT) 360 MG EC tablet Take 1 tablet (360 mg) by mouth every 6 hours 11/1/17   Jovan Tran MD   Mag oxide 400 mg po qd    Vitals:  /87  Pulse 91  Ht 1.829 m (6')  Wt 86.4 kg (190 lb 6.4 oz)  SpO2 100%  BMI 25.82 kg/m2    Exam:  Gen: Calm, pleasant and interactive  CARDIAC: Tachy  LUNGS: CTA  ABDOMEN: Colostomy present, Abd NT  EXT: Trace edema    Results:  Results for RONNIE ARIZMENDI (MRN  [id number]) as of 11/2/2017 21:48   Ref. Range 11/1/2017 11:30   Sodium Latest Ref Range: 133 - 144 mmol/L 136   Potassium Latest Ref Range: 3.4 - 5.3 mmol/L 6.4 (HH)   Chloride Latest Ref Range: 94 - 109 mmol/L 111 (H)   Carbon Dioxide Latest Ref Range: 20 - 32 mmol/L 17 (L)   Urea Nitrogen Latest Ref Range: 7 - 30 mg/dL 48 (H)   Creatinine Latest Ref Range: 0.66 - 1.25 mg/dL 2.13 (H)   GFR Estimate Latest Ref Range: >60 mL/min/1.7m2 33 (L)   GFR Estimate If Black Latest Ref Range: >60 mL/min/1.7m2 40 (L)   Calcium Latest Ref Range: 8.5 - 10.1 mg/dL 8.4 (L)   Anion Gap Latest Ref Range: 3 - 14 mmol/L 8   Magnesium Latest Ref Range: 1.6 - 2.3 mg/dL 1.4 (L)   Phosphorus Latest Ref Range: 2.5 - 4.5 mg/dL 3.8   Albumin Latest Ref Range: 3.4 - 5.0 g/dL 3.1 (L)   Glucose Latest Ref Range: 70 - 99 mg/dL 123 (H)   Hemoglobin Latest Ref Range: 13.3 - 17.7 g/dL 8.5 (L)

## 2017-11-03 NOTE — TELEPHONE ENCOUNTER
Phone call to pt, no answer.   Message left for WOJCIECH Del [place] with nephrology about potassium result of 6.4 today.

## 2017-11-03 NOTE — PROGRESS NOTES
CHIEF COMPLAINT:  Right index finger ischemia.      HISTORY OF PRESENT ILLNESS:  Camacho Bhagat is a 52-year-old male with multiple medical comorbidities including nonalcoholic steatohepatitis with subsequent cirrhosis, who is status post liver transplant in 03/2017.  Unfortunately, he was admitted to the hospital in 07/2017 for sepsis secondary to typhlitis and required support of vasopressors and subsequently developed right index finger ischemia and ischemia in his toes.  Vascular Surgery is seeing him and taking care of his toes and he was referred to us for management of his right necrotic index finger.  He states that it is very tender, especially when he bumps it or uses it to pinch anything.  He notes that intermittently gets more red and painful and there is intermittent drainage.  He would like to discuss treatment for this.      PAST MEDICAL HISTORY:   1.  Esophageal reflux.   2.  History of DVT, status post IVC filter.   3.  Nonalcoholic steatohepatitis with subsequent cirrhosis.   4.  Rheumatoid arthritis.   5.  Type 2 diabetes.   6.  Hypertension.   7.  Sleep apnea.      PAST SURGICAL HISTORY:   1.  Liver transplant in 03/2017.   2.  Right knee arthroscopy in 2008.   3.  Multiple exploratory laparotomies during 07/2017 due to necrotic bowel.      SOCIAL HISTORY:  He is , works as an  and is a former smoker and quit in 10/2015.  He denies alcohol use and quit in 2001.  Denies illicit drug use.      MEDICATIONS:  Please see updated medication list in Epic, which was reviewed.      ALLERGIES:   1.  Erythromycin.   2.  Vioxx.   Both cause nausea and vomiting.      REVIEW OF SYSTEMS:  A 10-point review of systems is negative except for those things noted in the history of present illness and the intake form review of systems.      PHYSICAL EXAMINATION:   GENERAL:  Well developed, well-nourished male in no acute distress.   RESPIRATORY:  Nonlabored breathing on room air.   ABDOMEN:   Nondistended.   FOCUSED EXAM:  Of the right upper extremity reveals all digits except the index finger warm and well perfused with brisk capillary refill and sensation intact to light touch in all median, radial and ulnar nerve distributions.  The index finger has a necrotic tip below the nail.  There is some reactive erythema surrounding this, but no neida signs of infection.  There is no drainage.  The necrotic area of tissue is very well adherent to the finger and cannot easily be unroofed.  This is very tender to palpation.      IMAGING:  None.      ASSESSMENT:  52-year-old male with multiple medical comorbidities including mass and secondary cirrhosis who was recently hospitalized 07/2017 for sepsis and necrotic bowel resulting in vasopressor support and subsequent ischemia to his digits.  Currently with right necrotic index finger tip.      PLAN:  We counseled Mr. Bhagat that he will benefit from a scar debridement.  At the time of surgery, we will assess the wound bed and if it is well-appearing, we will either let things heal by secondary intention or close it primarily.  However, if it appears more of the bone is involved or there is any infection present, we could perform a revision amputation.  He was counseled that most likely we will be able to perform a V-Y advancement flap to cover the area of the revision amputation site, but if the tissue appears poor, it is possible that he may need a distal phalanx amputation.  We counseled him on the risks, benefits and alternatives to both operative and nonoperative treatment and he agrees to proceed, and we will work on scheduling this in 1 week.      The patient seen and examined with Dr. Momo Noonan who agrees with the above stated assessment and plan.         Answers for HPI/ROS submitted by the patient on 11/2/2017   General Symptoms: No  Skin Symptoms: No  HENT Symptoms: No  EYE SYMPTOMS: No  HEART SYMPTOMS: No  LUNG SYMPTOMS: No  INTESTINAL SYMPTOMS:  No  URINARY SYMPTOMS: No  REPRODUCTIVE SYMPTOMS: No  SKELETAL SYMPTOMS: No  BLOOD SYMPTOMS: No  NERVOUS SYSTEM SYMPTOMS: No  MENTAL HEALTH SYMPTOMS: No  Patient was seen and examined with the resident.  I agree with the assessment and plan of care.

## 2017-11-06 ENCOUNTER — TELEPHONE (OUTPATIENT)
Dept: TRANSPLANT | Facility: CLINIC | Age: 52
End: 2017-11-06

## 2017-11-06 ENCOUNTER — TELEPHONE (OUTPATIENT)
Dept: ORTHOPEDICS | Facility: CLINIC | Age: 52
End: 2017-11-06

## 2017-11-06 ENCOUNTER — HOSPITAL ENCOUNTER (OUTPATIENT)
Dept: LAB | Facility: CLINIC | Age: 52
Discharge: HOME | End: 2017-11-06
Payer: COMMERCIAL

## 2017-11-06 ENCOUNTER — TELEPHONE (OUTPATIENT)
Dept: NEPHROLOGY | Facility: CLINIC | Age: 52
End: 2017-11-06

## 2017-11-06 DIAGNOSIS — D63.8 ANEMIA IN OTHER CHRONIC DISEASES CLASSIFIED ELSEWHERE: ICD-10-CM

## 2017-11-06 DIAGNOSIS — E83.42 HYPOMAGNESEMIA: ICD-10-CM

## 2017-11-06 DIAGNOSIS — Z94.4 HISTORY OF LIVER TRANSPLANT (H): Primary | ICD-10-CM

## 2017-11-06 DIAGNOSIS — M19.90 OSTEOARTHRITIS: Primary | ICD-10-CM

## 2017-11-06 DIAGNOSIS — N18.30 CKD (CHRONIC KIDNEY DISEASE) STAGE 3, GFR 30-59 ML/MIN (H): Primary | ICD-10-CM

## 2017-11-06 DIAGNOSIS — N18.4 CHRONIC KIDNEY DISEASE, STAGE 4 (SEVERE) (H): ICD-10-CM

## 2017-11-06 DIAGNOSIS — I10 HTN (HYPERTENSION): Primary | ICD-10-CM

## 2017-11-06 DIAGNOSIS — Z79.60 LONG-TERM USE OF IMMUNOSUPPRESSANT MEDICATION: ICD-10-CM

## 2017-11-06 PROBLEM — N25.89: Status: RESOLVED | Noted: 2017-08-25 | Resolved: 2017-11-06

## 2017-11-06 PROBLEM — K68.9: Status: RESOLVED | Noted: 2017-08-25 | Resolved: 2017-11-06

## 2017-11-06 PROBLEM — R63.8 INADEQUATE ORAL INTAKE: Status: RESOLVED | Noted: 2017-08-25 | Resolved: 2017-11-06

## 2017-11-06 PROBLEM — K52.9 TYPHLITIS: Status: RESOLVED | Noted: 2017-07-04 | Resolved: 2017-11-06

## 2017-11-06 PROBLEM — R41.0 DELIRIUM, ACUTE: Status: RESOLVED | Noted: 2017-08-25 | Resolved: 2017-11-06

## 2017-11-06 PROBLEM — A41.9 SEPSIS (H): Status: RESOLVED | Noted: 2017-07-04 | Resolved: 2017-11-06

## 2017-11-06 PROBLEM — M54.2 NECK PAIN: Status: RESOLVED | Noted: 2017-05-03 | Resolved: 2017-11-06

## 2017-11-06 PROBLEM — K68.19 RETROPERITONEAL ABSCESS (H): Status: RESOLVED | Noted: 2017-08-25 | Resolved: 2017-11-06

## 2017-11-06 PROBLEM — N17.9 AKI (ACUTE KIDNEY INJURY) (H): Status: RESOLVED | Noted: 2017-08-25 | Resolved: 2017-11-06

## 2017-11-06 PROBLEM — M54.9 BACK PAIN: Status: RESOLVED | Noted: 2017-05-03 | Resolved: 2017-11-06

## 2017-11-06 PROBLEM — K65.9: Status: RESOLVED | Noted: 2017-08-25 | Resolved: 2017-11-06

## 2017-11-06 LAB
ALBUMIN SERPL-MCNC: 3.2 G/DL (ref 3.4–5)
ANION GAP SERPL CALCULATED.3IONS-SCNC: 11 MMOL/L (ref 3–14)
BUN SERPL-MCNC: 46 MG/DL (ref 7–30)
CALCIUM SERPL-MCNC: 8.1 MG/DL (ref 8.5–10.1)
CHLORIDE SERPL-SCNC: 110 MMOL/L (ref 94–109)
CO2 SERPL-SCNC: 15 MMOL/L (ref 20–32)
CREAT SERPL-MCNC: 1.58 MG/DL (ref 0.66–1.25)
GFR SERPL CREATININE-BSD FRML MDRD: 46 ML/MIN/1.7M2
GLUCOSE SERPL-MCNC: 323 MG/DL (ref 70–99)
PHOSPHATE SERPL-MCNC: 3.4 MG/DL (ref 2.5–4.5)
POTASSIUM SERPL-SCNC: 5.7 MMOL/L (ref 3.4–5.3)
SODIUM SERPL-SCNC: 136 MMOL/L (ref 133–144)

## 2017-11-06 PROCEDURE — 36416 COLLJ CAPILLARY BLOOD SPEC: CPT

## 2017-11-06 PROCEDURE — 80069 RENAL FUNCTION PANEL: CPT

## 2017-11-06 RX ORDER — OXYCODONE HYDROCHLORIDE 10 MG/1
10 TABLET ORAL DAILY PRN
Qty: 30 TABLET | Refills: 0 | Status: ON HOLD | OUTPATIENT
Start: 2017-11-06 | End: 2018-02-15

## 2017-11-06 NOTE — TELEPHONE ENCOUNTER
Message left for Camacho re: results of labs today, both potassium and creatinine are better.   However the lab did not do hepatic panel, which pt needs to do twice a week.   With the change in his immunosuppression, reminded Camacho of importance of checking his liver tests.  Requested he repeat labs on Wed, and specifically do the post transplant labs, which includes the hepatic panel.    Requested he call back to verify this plan.

## 2017-11-06 NOTE — TELEPHONE ENCOUNTER
Patient called at D/c they increased his Norvasc to 10 mg will need to have his pharmacy updated with dose change- [facility]

## 2017-11-06 NOTE — TELEPHONE ENCOUNTER
Reason For Call:   Chief Complaint   Patient presents with     Opioid Refill     oxycodone       Medication Name, Dose and Monthly Quantity:   Oxycodone: Dose 10mg Schedule 1 tablet by mouth daily Monthly Quantity 30   Diagnosis requiring opiates:   Osteoarthritis    Problem List Updated:   No    Opioid Agreement On File - [facility] PAIN CONTRACT ID# [id number]:  No    Last Urine Drug Screen (at least once every 12 months) Date:   n/a  Unexpected Results:   No.    [place]  Data Reviewed (at least once every 3 months) Date:   11/6/2017   Unexpected Results:    No.    Last Fill Date:   6/17/17  Due Date:   As needed    Last Visit with PCP:   5/11/17    Future Visits with PCP:   No.    Processing:   Drop box.    Kristen Alvarez LPN

## 2017-11-07 ENCOUNTER — OFFICE VISIT (OUTPATIENT)
Dept: SURGERY | Facility: CLINIC | Age: 52
End: 2017-11-07

## 2017-11-07 ENCOUNTER — TELEPHONE (OUTPATIENT)
Dept: TRANSPLANT | Facility: CLINIC | Age: 52
End: 2017-11-07

## 2017-11-07 ENCOUNTER — ALLIED HEALTH/NURSE VISIT (OUTPATIENT)
Dept: SURGERY | Facility: CLINIC | Age: 52
End: 2017-11-07

## 2017-11-07 VITALS
HEIGHT: 72 IN | DIASTOLIC BLOOD PRESSURE: 91 MMHG | OXYGEN SATURATION: 100 % | BODY MASS INDEX: 24.54 KG/M2 | SYSTOLIC BLOOD PRESSURE: 159 MMHG | HEART RATE: 88 BPM | TEMPERATURE: 97.7 F | RESPIRATION RATE: 16 BRPM | WEIGHT: 181.2 LBS

## 2017-11-07 DIAGNOSIS — Z79.60 LONG-TERM USE OF IMMUNOSUPPRESSANT MEDICATION: ICD-10-CM

## 2017-11-07 DIAGNOSIS — Z94.4 HISTORY OF LIVER TRANSPLANT (H): ICD-10-CM

## 2017-11-07 DIAGNOSIS — Z01.818 PREOP EXAMINATION: Primary | ICD-10-CM

## 2017-11-07 DIAGNOSIS — E87.5 HYPERKALEMIA: ICD-10-CM

## 2017-11-07 LAB
ALBUMIN SERPL-MCNC: 3.5 G/DL (ref 3.4–5)
ALP SERPL-CCNC: 295 U/L (ref 40–150)
ALT SERPL W P-5'-P-CCNC: 22 U/L (ref 0–70)
ANION GAP SERPL CALCULATED.3IONS-SCNC: 8 MMOL/L (ref 3–14)
AST SERPL W P-5'-P-CCNC: 20 U/L (ref 0–45)
BILIRUB DIRECT SERPL-MCNC: 0.1 MG/DL (ref 0–0.2)
BILIRUB SERPL-MCNC: 0.4 MG/DL (ref 0.2–1.3)
BUN SERPL-MCNC: 40 MG/DL (ref 7–30)
CALCIUM SERPL-MCNC: 8.3 MG/DL (ref 8.5–10.1)
CHLORIDE SERPL-SCNC: 112 MMOL/L (ref 94–109)
CO2 SERPL-SCNC: 15 MMOL/L (ref 20–32)
CREAT SERPL-MCNC: 1.63 MG/DL (ref 0.66–1.25)
ERYTHROCYTE [DISTWIDTH] IN BLOOD BY AUTOMATED COUNT: 16.4 % (ref 10–15)
GFR SERPL CREATININE-BSD FRML MDRD: 44 ML/MIN/1.7M2
GLUCOSE SERPL-MCNC: 198 MG/DL (ref 70–99)
HCT VFR BLD AUTO: 25.5 % (ref 40–53)
HGB BLD-MCNC: 8.3 G/DL (ref 13.3–17.7)
MAGNESIUM SERPL-MCNC: 1.3 MG/DL (ref 1.6–2.3)
MCH RBC QN AUTO: 30.7 PG (ref 26.5–33)
MCHC RBC AUTO-ENTMCNC: 32.5 G/DL (ref 31.5–36.5)
MCV RBC AUTO: 94 FL (ref 78–100)
PHOSPHATE SERPL-MCNC: 2.9 MG/DL (ref 2.5–4.5)
PLATELET # BLD AUTO: 80 10E9/L (ref 150–450)
POTASSIUM SERPL-SCNC: 5.3 MMOL/L (ref 3.4–5.3)
PROT SERPL-MCNC: 8.1 G/DL (ref 6.8–8.8)
RBC # BLD AUTO: 2.7 10E12/L (ref 4.4–5.9)
SODIUM SERPL-SCNC: 135 MMOL/L (ref 133–144)
WBC # BLD AUTO: 6.5 10E9/L (ref 4–11)

## 2017-11-07 RX ORDER — AMLODIPINE BESYLATE 10 MG/1
10 TABLET ORAL DAILY
Qty: 90 TABLET | Refills: 3 | Status: SHIPPED | OUTPATIENT
Start: 2017-11-07 | End: 2018-11-08

## 2017-11-07 NOTE — TELEPHONE ENCOUNTER
Last Office Visit with Nephrologist:  11/1/2017.  Medication refilled per Nephrology Clinic protocol.    Quang Conteh RN

## 2017-11-07 NOTE — TELEPHONE ENCOUNTER
Patient Call: Medication Refill  Per patient needs Rx for Fludrocortisone 0.1 mg re-written for 90 day supply  Call pt with questions.

## 2017-11-07 NOTE — H&P
Pre-Operative H & P     Date of Encounter: 11/7/2017  Primary Care Physician:  Shay Kirkpatrick    CC: Ischemia and necrosis of the right index finger.      HPI:  Camacho Bhagat is a 52 year old male who presents for pre-operative H & P in preparation for Debride, V-Y Flap Right Index Finger on 11/10/17 with Dr. Momo Noonan at [facility].   The patient was evaluated by Dr. Noonan on 11/3/17 in regards to ischemia and necrosis of his right index finger.  His medical history is significant for sepsis in July of this year due to neutropenic colitis, requiring vasopressor support, and the patient developed ischemia in his right index finger and toes.  The patient reported that the finger is very tender to touch, and that there is intermittent drainage.  Physical exam revealed a right necrotic index fingertip.  The surgical options were discussed with the patient and arrangements are now being made for the above procedures.   History is obtained from the patient and the medical record.    Past Medical History:  Past Medical History:   Diagnosis Date     Depressive disorder, not elsewhere classified      Diabetes type 2, controlled (H) 11/10/2016     Esophageal reflux      Fibromyalgia 1/2009    dx with Dr Benitez( Rheum)     Gangrene of finger (H) 8/25/2017     H/O deep venous thrombosis 11/2001    Permanent IVC filter in place.     H/O CASTAÑEDA (nonalcoholic steatohepatitis)      H/O Pneumonia, organism unspecified(486) 10/2001    Included ARDS, sepsis, and  acute renal failure; hospitalized, required tracheostomy placement.     H/O: HTN (hypertension) 11/2001    No longer prescribed antihypertensive medication.       History of hepatocellular carcinoma      History of liver transplant (H)      History of obstructive sleep apnea     No longer wears CPAP since losing approximately 200 pounds with his liver transplant and its complications.       HLD (hyperlipidemia)      Ischemia of both lower  extremities 2017    Distal ischemia due to shock/high pressor requirements     Neutropenic colitis (H) 2017     Osteoarthritis      Presence of PERMANENT IVC filter      Rheumatoid arthritis(714.0)      Past Surgical History:  Past Surgical History:   Procedure Laterality Date     BENCH LIVER N/A 3/4/2017    Procedure: BENCH LIVER;  Surgeon: Jovan Tran MD;  Location: [facility] TOTAL KNEE ARTHROPLASTY      Right knee arthroscopy     CHOLECYSTECTOMY       COLONOSCOPY N/A 2017    Procedure: COMBINED COLONOSCOPY, SINGLE OR MULTIPLE BIOPSY/POLYPECTOMY BY BIOPSY;  Colonoscopy;  Surgeon: Izaiah Montes MD;  Location:  GI     ENT SURGERY      Repair of deviated septum     ESOPHAGOSCOPY, GASTROSCOPY, DUODENOSCOPY (EGD), COMBINED N/A 2016    Procedure: COMBINED ESOPHAGOSCOPY, GASTROSCOPY, DUODENOSCOPY (EGD), BIOPSY SINGLE OR MULTIPLE;  Surgeon: Trent Pederson MD;  Location:  GI     ESOPHAGOSCOPY, GASTROSCOPY, DUODENOSCOPY (EGD), COMBINED N/A 2017    Procedure: COMBINED ESOPHAGOSCOPY, GASTROSCOPY, DUODENOSCOPY (EGD);;  Surgeon: Los Wynn MD;  [facility]     LAPAROTOMY EXPLORATORY N/A 2017    Procedure: LAPAROTOMY EXPLORATORY;  Exploratory Laparotomy, washout;  Surgeon: Tip Zhang MD;  [facility] OR     LAPAROTOMY EXPLORATORY N/A 2017    Procedure: LAPAROTOMY EXPLORATORY;  Exploratory Laparotomy, Washout with closure.;  Surgeon: Tip Zhang MD;  [facility]     LAPAROTOMY EXPLORATORY N/A 2017    Procedure: LAPAROTOMY EXPLORATORY;  Exploratory Laparotomy, Right angelique-colectomy, end ileostomy, mucosal fistula, partial omentectomy;  Surgeon: Sara Dinh MD;  Location: [facility] OR     ORTHOPEDIC SURGERY Right     Repair of dislocated wrist.       TRACHEOSTOMY  2001    During hospitalization for pneumonia.       TRANSPLANT LIVER RECIPIENT  DONOR N/A 3/4/2017    Procedure: TRANSPLANT LIVER RECIPIENT   DONOR;  Surgeon: Jovan Tran MD;  Location: [facility] OR     Hx of Blood transfusions/reactions: History of transfusions, no known reactions.       Hx of abnormal bleeding or anti-platelet use: Denies.    Menstrual history: No LMP for male patient.    Steroid use in the last year: Prednisone 10 mg PO QD.    Personal or FH of difficulty with anesthesia: Denies.    Prior to admission medications  Current Outpatient Prescriptions   Medication Sig Dispense Refill     amLODIPine (NORVASC) 10 MG tablet Take 1 tablet (10 mg) by mouth daily (Patient taking differently: Take 10 mg by mouth every morning ) 90 tablet 3     oxyCODONE IR (ROXICODONE) 10 MG tablet Take 1 tablet (10 mg) by mouth daily as needed for moderate to severe pain 30 tablet 0     furosemide (LASIX) 20 MG tablet Take 1 tablet (20 mg) by mouth 2 times daily 60 tablet 3     predniSONE (DELTASONE) 10 MG tablet Take 1 tablet (10 mg) by mouth daily (Patient taking differently: Take 10 mg by mouth every morning ) 30 tablet 11     tacrolimus (GENERIC EQUIVALENT) 1 MG capsule Take 2 capsules (2 mg) by mouth every 12 hours 60 capsule 11     mycophenolic acid (GENERIC EQUIVALENT) 360 MG EC tablet Take 1 tablet (360 mg) by mouth every 6 hours (Patient taking differently: Take 720 mg by mouth 2 times daily ) 120 tablet 5     patiromer (VELTASSA) 8.4 G packet Take 8.4 g by mouth daily 30 each 3     sodium bicarbonate 650 MG tablet Take 2 tablets (1,300 mg) by mouth 3 times daily 180 tablet 3     fludrocortisone (FLORINEF) 0.1 MG tablet Take 3 tablets (0.3 mg) by mouth daily (Patient taking differently: Take 0.3 mg by mouth every morning ) 90 tablet 3     OMEPRAZOLE PO Take 20 mg by mouth every morning        GABAPENTIN PO Take 300 mg by mouth 3 times daily       insulin glargine (LANTUS) 100 UNIT/ML injection Inject 50 Units Subcutaneous every morning       linagliptin (TRADJENTA) 5 MG TABS tablet Take 5 mg by mouth every evening        loperamide (IMODIUM) 2 MG  capsule Take 2 capsules (4 mg) by mouth 2 times daily 120 capsule 3     cyclobenzaprine (FLEXERIL) 10 MG tablet Take 1 tablet (10 mg) by mouth 3 times daily as needed for muscle spasms 90 tablet 0     magnesium oxide (MAG-OX) 400 (241.3 MG) MG tablet Take 1 tablet (400 mg) by mouth daily 90 tablet 4     CIALIS 5 MG tablet Take 5 mg by mouth as needed   1     [DISCONTINUED] amLODIPine (NORVASC) 5 MG tablet Take 2 tablets (10 mg) by mouth daily 60 tablet 3     Allergies  Allergies   Allergen Reactions     Erythromycin GI Disturbance     Vioxx      Nausea, vomiting     Social History  Social History     Social History     Marital status:      Spouse name: N/A     Number of children: 1     Years of education: N/A     Occupational History            Social History Main Topics     Smoking status: Former Smoker     Packs/day: 0.75     Years: 2.00     Quit date: 1988     Smokeless tobacco: Former User     Types: Chew     Quit date: 10/8/2015     Alcohol use No      Comment: No alcohol since liver transplant.       Drug use: No     Sexual activity: Not Currently     Partners: Female     Other Topics Concern     Not on file     Social History Narrative    uSED TO BE      Back in school now         Family History  Family History   Problem Relation Age of Onset     DIABETES Father      Hypertension Father      Substance Abuse Father      Arthritis Mother      Thyroid Cancer Mother      Survivor!     Cervical Cancer Maternal Grandmother      CEREBROVASCULAR DISEASE Maternal Grandfather      Prostate Cancer Paternal Grandfather      Colon Cancer No family hx of      Hyperlipidemia No family hx of      Coronary Artery Disease No family hx of      Breast Cancer No family hx of      Review of Systems  Functional status: Independent in ADL's.  4 METS.     The complete review of systems is negative other than noted in the HPI or here.   Constitutional: Denies recent changes in sleeping  "patterns, or fevers/chills.  The patient reports that he lost approximately 200 pounds with his liver transplant and admission for sepsis due to neutropenic colitis earlier this year.  Eyes: No recent vision changes.  EENT: Denies recent changes in hearing, mouth pain, or difficulty swallowing.  Cardiovascular: Denies chest pain, LINARES or orthopnea, or palpitations.  Respiratory: Denies shortness of breath or significant cough.    GI: Denies nausea/vomiting or diarrhea/constipation.    : Denies dysuria.    Musculoskeletal: Denies joint pain or swelling, with the exception of chronic back pain..    Skin: Denies rashes or wounds.    Hematologic: Denies easy bruising or bleeding.    Neurologic: Denies migraines, seizures, dizziness, numbness/tingling.  Psychiatric: Denies changes in mood or affect.      BP (!) 159/91  Pulse 88  Temp 97.7  F (36.5  C) (Oral)  Resp 16  Ht 1.829 m (6')  Wt 82.2 kg (181 lb 3.2 oz)  SpO2 100%  BMI 24.58 kg/m2    181 lbs 3.2 oz  6' 0\"   Body mass index is 24.58 kg/(m^2).    Physical Exam  Constitutional: Patient awake, seated upright in a chair, in no apparent distress.  Appears stated age.  Eyes: Pupils equal, round and reactive to light.  Extra ocular muscles intact. Sclera clear.  Conjunctiva normal.  HENT: Head normocephalic.  Oral pharynx intact with moist mucous membranes.  Dentition intact.  No thyromegaly appreciated.   Respiratory: Lung sounds clear to auscultation in the upper and left lower lung fields, diminished over the right base.  No rales, rhonchi, or wheezing noted.    Cardiovascular: S1, S2, regular rate and rhythm.  No murmurs, rubs, or gallops noted. Radial and pedal pulses palpable, bilaterally.  Trace pedal edema noted, bilaterally.    GI: Bowel sounds present.  Ostomy in place over the RMQ.  Dressing over the mucous fistula site dry and intact.  Abdomen soft, with excess skin, non-tender to light palpation.  No hepatosplenomegaly or masses palpated. "   Genitourinary: Exam deferred.  Lymph/Hematologic: No cervical or supraclavicular lymphadenopathy noted.  No excessive bruising noted.    Skin: Color appropriate for race, warm, dry.  No rashes or wounds at anticipated surgical site.  Area of eschar over the tip of the right index finger.  No drainage noted.  No signs/symptoms of infection.    Musculoskeletal:  extension of the neck.  No redness, warmth, or swelling of the joints noted. Gross motor strength is normal.    Neurologic: Alert, oriented to name, place and time. Cranial nerves II-XII are grossly intact. Gait is normal.      Neuropsychiatric: Calm, cooperative. Normal affect.     Labs:  17: WBC:  6.5; Hgb: 8.3; Hct: 25.5; Plt: 80  17: Na: 135; K: 5.3; Cl: 112; Glu: 198; BUN: 40; Cr: 1.63; Ca: 8.3; Bili total: 0.1; Albumin: 3.5; Alk phos: 295; ALT: 22; AST: 20; Mag 1.3; Phos 2.9    Imagin17 Echocardiogram:  Interpretation Summary  Global and regional left ventricular function is normal with an EF of 60-65%.  Mild right ventricular dilation is present.  Global right ventricular function is normal.  Right ventricular systolic pressure is 36mmHg above the right atrial pressure.  No pericardial effusion is present.  This study was compared with the study from 2017, no significant changes are found.     10/24/17 EKG: Sinus rhythm    Lab results, EKG were personally reviewed by this provider.        Assessment and Plan  Camacho Bhagat is a 52 year old male scheduled to undergo Debride, V-Y Flap Right Index Finger on 11/10/17 with Dr. Momo Noonan.    He has the following specific operative considerations:   1.  GERD: Patient instructed to take Omeprazole as prescribed.    2.  S/P liver transplant due to nonalcoholic steatohepatitis with subsequent cirrhosis and hepatocellular carcinoma: Recommend that hepatotoxic medications be minimized or avoided.  3.  Chronic steroid use: The patient takes Prednisone 10 mg PO QD for post-transplant  immunosuppression.  Do not anticipate that stress-dose steroids will be required intraprocedurally.    4.  CKD: Recommend close monitoring of fluid balance and electrolytes throughout the preprocedural period.    5.  Anemia: Recommend use of blood conservation techniques intraoperatively and close monitoring of postoperative bleeding.  6.  Diabetes: Patient instructed to take Tradjenta as prescribed the AM of surgery, but only take 40 units of Lantus the AM of surgery.  Recommend close monitoring of blood glucose levels throughout the perioperative period.  7.  Spoke with Rock Lombardo MD.  OK for this procedure to be performed at the [facility].    Revised Cardiac Risk Index: 0.9% risk of major adverse cardiac event.  RONNIE risk: Low  PONV risk score= 2.  (If > 2, anti-emetic intervention is recommended.)  Anesthesia considerations: Refer to PAC assessment in the anesthesia records.    Angélica Michaud NP  Preoperative Assessment Center  [facility]  Phone: [phone]  Fax: [phone]

## 2017-11-07 NOTE — PATIENT INSTRUCTIONS
Preparing for Your Surgery      Name:  Camacho Bhagat   MRN:  [id number]   :  1965   Today's Date:  2017     Arriving for surgery:  Surgery date:  11/10/17  Arrival time:  1420  Please come to:     [facility]  [address] [place] [id number]     Parking is available in front of the Clinics and Surgery Center building from 5:30AM to 8:00PM.  -  Proceed to the 5th floor to check into the Ambulatory Surgery Center.              >> There will be patient concierges on the 1st and 5th floor, for assistance or an escort, if you would like.              >> Please call [phone] with any questions.    What can I eat or drink?  -  You may have solid food or milk products until 8 hours prior to your surgery.  -  You may have water, apple juice or 7up/Sprite until 2 hours prior to your surgery.    Which medicines can I take?  -  Do NOT take these medications in the morning, the day of surgery:  Aspirin x 7 days before surgery, ibuprofen x 2 days before surgery.    -  Please take these medications the day of surgery:    Tylenol if needed, morning meds normally taken, 40 units of insulin in the morning    How do I prepare myself?  -  Take two showers: one the night before surgery; and one the morning of surgery.         Use Scrubcare or Hibiclens to wash from neck down.  You may use your own shampoo and conditioner. No other hair products.   -  Do NOT use lotion, powder, deodorant, or antiperspirant the day of your surgery.  -  Do NOT wear any makeup, fingernail polish or jewelry.  -  Begin using Incentive Spirometer 1 week prior to surgery.  Use 4 times per day, up to 5 breaths each time.  Bring Incentive Spirometer to hospital.  -Do not bring your own medications to the hospital, except for inhalers and eye drops.  -  Bring your ID and insurance card.    Questions or Concerns:  If you have questions or concerns, please call the  Preoperative Assessment Center,  Monday-Friday 7AM-7PM:  [phone]

## 2017-11-07 NOTE — MR AVS SNAPSHOT
After Visit Summary   2017    Camacho Bhagat    MRN: [id number]           Patient Information     Date Of Birth          1965        Visit Information        Provider Department      2017 2:30 PM Rn, [facility]        Care Instructions    Preparing for Your Surgery      Name:  Camacho Bhagat   MRN:  [id number]   :  1965   Today's Date:  2017     Arriving for surgery:  Surgery date:  11/10/17  Arrival time:  1420  Please come to:     [facility]  [address] [place] [id number]    [employer] Parking is available in front of the [facility] building from 5:30AM to 8:00PM.  -  Proceed to the 5th floor to check into the Ambulatory Surgery Center.              >> There will be patient concierges on the 1st and 5th floor, for assistance or an escort, if you would like.              >> Please call [phone] with any questions.    What can I eat or drink?  -  You may have solid food or milk products until 8 hours prior to your surgery.  -  You may have water, apple juice or 7up/Sprite until 2 hours prior to your surgery.    Which medicines can I take?  -  Do NOT take these medications in the morning, the day of surgery:  Aspirin x 7 days before surgery, ibuprofen x 2 days before surgery.    -  Please take these medications the day of surgery:    Tylenol if needed, morning meds normally taken, 40 units of insulin in the morning    How do I prepare myself?  -  Take two showers: one the night before surgery; and one the morning of surgery.         Use Scrubcare or Hibiclens to wash from neck down.  You may use your own shampoo and conditioner. No other hair products.   -  Do NOT use lotion, powder, deodorant, or antiperspirant the day of your surgery.  -  Do NOT wear any makeup, fingernail polish or jewelry.  -  Begin using Incentive Spirometer 1 week prior to surgery.  Use 4 times per day, up to 5  breaths each time.  Bring Incentive Spirometer to hospital.  -Do not bring your own medications to the hospital, except for inhalers and eye drops.  -  Bring your ID and insurance card.    Questions or Concerns:  If you have questions or concerns, please call the  Preoperative Assessment Center, Monday-Friday 7AM-7PM:  [phone]                    Follow-ups after your visit        Your next 10 appointments already scheduled     Nov 07, 2017  2:30 PM CST   (Arrive by 2:15 PM)   PAC RN ASSESSMENT with  Pac Rn   [facility] Preoperative [facility] [place] ([facility])    [address] [place] [address]  [address] [facility] [id number] [phone]            Nov 07, 2017  3:20 PM CST   (Arrive by 3:05 PM)   PAC Anesthesia Consult with  Pac Anesthesiologist   [facility] Preoperative Assessment [place] ([facility])    [address] [place] [address]  [address] [facility] [id number] [phone]            Nov 07, 2017  3:30 PM CST   LAB with  LAB   [facility] Lab ([facility])    [address] [place] [address]  [address] [facility] [id number] [phone]           Please do not eat 10-12 hours before your appointment if you are coming in fasting for labs on lipids, cholesterol, or glucose (sugar). This does not apply to pregnant women. Water, hot tea and black coffee (with nothing added) are okay. Do not drink other fluids, diet soda or chew gum.            Nov 10, 2017   Procedure with Momo Noonan MD   [facility] ([facility])    [address] [place] [address]  [facility] [facility] [id number] [phone]           Located in the Clinics and Surgery Center at [address] [place] [address].   parking is very convenient and highly recommended.  is a $6 flat rate fee.  Both  and self parkers should enter the main arrival plaza from [facility]; parking attendants will direct  you based on your parking preference.            Nov 17, 2017 11:40 AM CST   (Arrive by 11:25 AM)   POST-OP HAND with Momo Noonan MD   [facility] ([facility])    [address] [place] [address]   [phone]            Nov 20, 2017  2:45 PM CST   (Arrive by 2:30 PM)   Return Vascular Visit with Estefani Beal MD   [facility] ([facility])    [address] [place] [id number]   [phone]            Dec 01, 2017  8:45 AM CST   (Arrive by 8:30 AM)   Return Liver Transplant with Toñito Camejo MD   [facility] Hepatology ([facility])    [address] [place] [address]   [phone]            Dec 06, 2017 11:30 AM CST   Lab with  LAB   [facility] Lab ([facility])    [address] [place] [address]   [phone]            Dec 06, 2017  1:00 PM CST   (Arrive by 12:30 PM)   Return Visit with Cinda Cazares NP   [facility] Nephrology ([facility])    [address] [place] [address]   [phone]            Dec 13, 2017 10:30 AM CST   (Arrive by 10:15 AM)   Return Visit with Sara Dinh MD   [facility] Colon and Rectal Surgery ([facility])    [address] [place] [address]   [phone]              Future tests that were ordered for you today     Open Standing Orders        Priority Remaining Interval Expires Ordered    Phosphorus Routine 25/25 2X Week 11/6/2018 11/6/2017    Magnesium Routine 25/25 2X Week 11/6/2018 11/6/2017    Hepatic panel Routine 25/25 2X Week 11/6/2018 11/6/2017    CBC with platelets Routine 25/25 2X Week 11/6/2018 11/6/2017    Basic metabolic panel Routine 25/25 2X Week 11/6/2018 11/6/2017            Who to contact     Please call  your clinic at [phone] to:    Ask questions about your health    Make or cancel appointments    Discuss your medicines    Learn about your test results    Speak to your doctor   If you have compliments or concerns about an experience at your clinic, or if you wish to file a complaint, please contact [facility] Patient Relations at [phone] or email us at [email or web address]         Additional Information About Your Visit        [employer] Information     [employer] gives you secure access to your electronic health record. If you see a primary care provider, you can also send messages to your care team and make appointments. If you have questions, please call your primary care clinic.  If you do not have a primary care provider, please call [phone] and they will assist you.      [employer] is an electronic gateway that provides easy, online access to your medical records. With [employer], you can request a clinic appointment, read your test results, renew a prescription or communicate with your care team.     To access your existing account, please contact your [facility] or call [phone] for assistance.        Care EveryWhere ID     This is your Care EveryWhere ID. This could be used by other organizations to access your [place] medical records  [id number]         Blood Pressure from Last 3 Encounters:   11/07/17 (!) 159/91   11/01/17 151/87   10/31/17 150/85    Weight from Last 3 Encounters:   11/07/17 82.2 kg (181 lb 3.2 oz)   11/03/17 86.4 kg (190 lb 7.6 oz)   11/01/17 86.4 kg (190 lb 6.4 oz)              Today, you had the following     No orders found for display         Today's Medication Changes          These changes are accurate as of: 11/7/17  2:20 PM.  If you have any questions, ask your nurse or doctor.               These medicines have changed or have updated prescriptions.        Dose/Directions    amLODIPine 10 MG tablet    Commonly known as:  NORVASC   This may have changed:  when to take this   Used for:  HTN (hypertension)        Dose:  10 mg   Take 1 tablet (10 mg) by mouth daily   Quantity:  90 tablet   Refills:  3       fludrocortisone 0.1 MG tablet   Commonly known as:  FLORINEF   This may have changed:  when to take this   Used for:  Hyperkalemia        Dose:  0.3 mg   Take 3 tablets (0.3 mg) by mouth daily   Quantity:  90 tablet   Refills:  3       mycophenolic acid 360 MG EC tablet   Commonly known as:  GENERIC EQUIVALENT   This may have changed:    - how much to take  - when to take this   Used for:  History of liver transplant (H), Long-term use of immunosuppressant medication        Dose:  360 mg   Take 1 tablet (360 mg) by mouth every 6 hours   Quantity:  120 tablet   Refills:  5       predniSONE 10 MG tablet   Commonly known as:  DELTASONE   This may have changed:  when to take this   Used for:  Liver transplant recipient (H), Long-term use of immunosuppressant medication        Dose:  10 mg   Take 1 tablet (10 mg) by mouth daily   Quantity:  30 tablet   Refills:  11                Primary Care Provider    Shay Kirkpatrick MD       PWB  MD [address]        Equal Access to Services     [facility]: Hadii tavo swartz hadasho Sojose, waaxda luqadaha, qaybta kaalmada adesammi, devi craig . So [facility] [phone].    ATENCIÓN: Si habla español, tiene a zheng disposición servicios gratuitos de asistencia lingüística. [facility] [phone].    We comply with applicable federal civil rights laws and [place] laws. We do not discriminate on the basis of race, color, national origin, age, disability, sex, sexual orientation, or gender identity.            Thank you!     Thank you for choosing [facility] PREOPERATIVE [facility]  for your care. Our goal is always to provide you with excellent care. Hearing back from our patients is one way we can continue to improve our services. Please take a few minutes  to complete the written survey that you may receive in the mail after your visit with us. Thank you!             Your Updated Medication List - Protect others around you: Learn how to safely use, store and throw away your medicines at www.disposemymeds.org.          This list is accurate as of: 11/7/17  2:20 PM.  Always use your most recent med list.                   Brand Name Dispense Instructions for use Diagnosis    amLODIPine 10 MG tablet    NORVASC    90 tablet    Take 1 tablet (10 mg) by mouth daily    HTN (hypertension)       CIALIS 5 MG tablet   Generic drug:  tadalafil      Take 5 mg by mouth as needed        cyclobenzaprine 10 MG tablet    FLEXERIL    90 tablet    Take 1 tablet (10 mg) by mouth 3 times daily as needed for muscle spasms    Immunosuppression (H)       fludrocortisone 0.1 MG tablet    FLORINEF    90 tablet    Take 3 tablets (0.3 mg) by mouth daily    Hyperkalemia       furosemide 20 MG tablet    LASIX    60 tablet    Take 1 tablet (20 mg) by mouth 2 times daily    Hyperkalemia       GABAPENTIN PO      Take 300 mg by mouth 3 times daily        insulin glargine 100 UNIT/ML injection    LANTUS     Inject 50 Units Subcutaneous every morning        linagliptin 5 MG Tabs tablet    TRADJENTA     Take 5 mg by mouth every evening        loperamide 2 MG capsule    IMODIUM    120 capsule    Take 2 capsules (4 mg) by mouth 2 times daily    S/P right hemicolectomy       magnesium oxide 400 (241.3 MG) MG tablet    MAG-OX    90 tablet    Take 1 tablet (400 mg) by mouth daily    Hypomagnesemia, CKD (chronic kidney disease) stage 3, GFR 30-59 ml/min       mycophenolic acid 360 MG EC tablet    GENERIC EQUIVALENT    120 tablet    Take 1 tablet (360 mg) by mouth every 6 hours    History of liver transplant (H), Long-term use of immunosuppressant medication       OMEPRAZOLE PO      Take 20 mg by mouth every morning        oxyCODONE IR 10 MG tablet    ROXICODONE    30 tablet    Take 1 tablet (10 mg) by mouth  daily as needed for moderate to severe pain    Osteoarthritis       predniSONE 10 MG tablet    DELTASONE    30 tablet    Take 1 tablet (10 mg) by mouth daily    Liver transplant recipient (H), Long-term use of immunosuppressant medication       sodium bicarbonate 650 MG tablet     180 tablet    Take 2 tablets (1,300 mg) by mouth 3 times daily    Hyperkalemia       tacrolimus 1 MG capsule    GENERIC EQUIVALENT    60 capsule    Take 2 capsules (2 mg) by mouth every 12 hours    Liver transplant recipient (H)       VELTASSA 8.4 G packet   Generic drug:  patiromer     30 each    Take 8.4 g by mouth daily    Hyperkalemia

## 2017-11-07 NOTE — TELEPHONE ENCOUNTER
Patient Call: Transplant Lab  Reason for call: Clarification   Please call Camacho # [phone]  Is going in for H/P today 11/07/17 at 1:30pm was wondering if OK to have his labs drawn today(11/07/17) and not Wednesday?11/08/17

## 2017-11-08 ENCOUNTER — TELEPHONE (OUTPATIENT)
Dept: PHARMACY | Facility: CLINIC | Age: 52
End: 2017-11-08

## 2017-11-08 DIAGNOSIS — N18.30 ANEMIA IN STAGE 3 CHRONIC KIDNEY DISEASE (H): Primary | ICD-10-CM

## 2017-11-08 DIAGNOSIS — N18.30 CKD (CHRONIC KIDNEY DISEASE) STAGE 3, GFR 30-59 ML/MIN (H): ICD-10-CM

## 2017-11-08 DIAGNOSIS — Z94.4 LIVER TRANSPLANT RECIPIENT (H): ICD-10-CM

## 2017-11-08 DIAGNOSIS — D63.1 ANEMIA IN STAGE 3 CHRONIC KIDNEY DISEASE (H): Primary | ICD-10-CM

## 2017-11-08 RX ORDER — FLUDROCORTISONE ACETATE 0.1 MG/1
0.3 TABLET ORAL DAILY
Qty: 90 TABLET | Refills: 3 | Status: SHIPPED | OUTPATIENT
Start: 2017-11-08 | End: 2018-01-31

## 2017-11-08 NOTE — TELEPHONE ENCOUNTER
Anemia Management Note - Enrollment  SUBJECTIVE/OBJECTIVE:    Referred by Jovanna Cazares CNP on 11/6/2017  Primary Diagnosis: Anemia in Chronic Kidney Disease (N18.3, D63.1)     Secondary Diagnosis:  Chronic Kidney Disease, Stage 3 (N18.3)   Hgb goal range:  9-10  Epo/Darbo:  None  Iron regimen: None  Labs exp: 11/7/18     Anemia Latest Ref Rng & Units 10/26/2017 10/26/2017 10/27/2017 10/30/2017 11/1/2017 11/3/2017 11/7/2017   Hemoglobin 13.3 - 17.7 g/dL 7.2(L) 7.0(L) 6.9(LL) 8.4(L) 8.5(L) 8.0(L) 8.3(L)   TSAT 15 - 46 % - - - - - - -   Ferritin 26 - 388 ng/mL - - - - - - -   PRBCs - - - 1 unit - - - -      BP Readings from Last 3 Encounters:   11/07/17 (!) 159/91   11/01/17 151/87   10/31/17 150/85     Wt Readings from Last 2 Encounters:   11/07/17 181 lb 3.2 oz (82.2 kg)   11/03/17 190 lb 7.6 oz (86.4 kg)     Current Outpatient Prescriptions   Medication Sig Dispense Refill     amLODIPine (NORVASC) 10 MG tablet Take 1 tablet (10 mg) by mouth daily (Patient taking differently: Take 10 mg by mouth every morning ) 90 tablet 3     oxyCODONE IR (ROXICODONE) 10 MG tablet Take 1 tablet (10 mg) by mouth daily as needed for moderate to severe pain 30 tablet 0     magnesium oxide (MAG-OX) 400 (241.3 MG) MG tablet Take 1 tablet (400 mg) by mouth daily 90 tablet 4     CIALIS 5 MG tablet Take 5 mg by mouth as needed   1     furosemide (LASIX) 20 MG tablet Take 1 tablet (20 mg) by mouth 2 times daily 60 tablet 3     predniSONE (DELTASONE) 10 MG tablet Take 1 tablet (10 mg) by mouth daily (Patient taking differently: Take 10 mg by mouth every morning ) 30 tablet 11     tacrolimus (GENERIC EQUIVALENT) 1 MG capsule Take 2 capsules (2 mg) by mouth every 12 hours 60 capsule 11     mycophenolic acid (GENERIC EQUIVALENT) 360 MG EC tablet Take 1 tablet (360 mg) by mouth every 6 hours (Patient taking differently: Take 720 mg by mouth 2 times daily ) 120 tablet 5     patiromer (VELTASSA) 8.4 G packet Take 8.4 g by mouth daily 30 each 3      sodium bicarbonate 650 MG tablet Take 2 tablets (1,300 mg) by mouth 3 times daily 180 tablet 3     fludrocortisone (FLORINEF) 0.1 MG tablet Take 3 tablets (0.3 mg) by mouth daily (Patient taking differently: Take 0.3 mg by mouth every morning ) 90 tablet 3     OMEPRAZOLE PO Take 20 mg by mouth every morning        GABAPENTIN PO Take 300 mg by mouth 3 times daily       insulin glargine (LANTUS) 100 UNIT/ML injection Inject 50 Units Subcutaneous every morning       linagliptin (TRADJENTA) 5 MG TABS tablet Take 5 mg by mouth every evening        loperamide (IMODIUM) 2 MG capsule Take 2 capsules (4 mg) by mouth 2 times daily 120 capsule 3     cyclobenzaprine (FLEXERIL) 10 MG tablet Take 1 tablet (10 mg) by mouth 3 times daily as needed for muscle spasms 90 tablet 0     ASSESSMENT:  Hgb not at goal   Ferritin: Due for labs  TSat: Due for labs    PLAN:  1. Patient called today for enrollment in Anemia Management Service. LM for patient to call back  2. Discussed:  anemia overview, monitoring service and goal hemoglobin range and rationale and risks of DIPAK blood clots, stroke and increase in blood pressure  3. Send new patient letter with medication guide to patient after labs next week.   4. Iron studies Friday or Monday then determine treatment plan.    Next call date: 11/14    Anemia Management Service  Mikel CokerD, BCACP and Estefani Rosales CPhT  Phone: [phone]  Fax: [phone]

## 2017-11-08 NOTE — TELEPHONE ENCOUNTER
Please call Camacho about dose change tacrolimus.   Decrease dose to 1mg Q 12 hours, with plan to continue labs twice a week.    Regarding the medication he is taking for high potassium, he should continue this.

## 2017-11-09 RX ORDER — TACROLIMUS 1 MG/1
1 CAPSULE ORAL EVERY 12 HOURS
Qty: 60 CAPSULE | Refills: 11 | Status: ON HOLD | OUTPATIENT
Start: 2017-11-09 | End: 2018-02-15

## 2017-11-09 NOTE — TELEPHONE ENCOUNTER
LM for pt instructing him to decrease tacrolimus to 1 mg bid, continue labs twice a week, and continue taking Florinef. Requested return phone call to confirm dose change.

## 2017-11-09 NOTE — TELEPHONE ENCOUNTER
Pt called back to confirm dose change for tacrolimus. He will do labs tomorrow before surgery and skip tacrolimus level. Will continue with labs twice weekly.

## 2017-11-10 ENCOUNTER — TELEPHONE (OUTPATIENT)
Dept: PHARMACY | Facility: CLINIC | Age: 52
End: 2017-11-10

## 2017-11-10 ENCOUNTER — HOSPITAL ENCOUNTER (OUTPATIENT)
Facility: AMBULATORY SURGERY CENTER | Age: 52
End: 2017-11-10
Attending: ORTHOPAEDIC SURGERY

## 2017-11-10 VITALS
WEIGHT: 181.2 LBS | TEMPERATURE: 98.2 F | RESPIRATION RATE: 16 BRPM | OXYGEN SATURATION: 100 % | BODY MASS INDEX: 24.54 KG/M2 | SYSTOLIC BLOOD PRESSURE: 146 MMHG | HEIGHT: 72 IN | HEART RATE: 80 BPM | DIASTOLIC BLOOD PRESSURE: 87 MMHG

## 2017-11-10 DIAGNOSIS — Z79.60 LONG-TERM USE OF IMMUNOSUPPRESSANT MEDICATION: ICD-10-CM

## 2017-11-10 DIAGNOSIS — I96 GANGRENE OF FINGER (H): Primary | ICD-10-CM

## 2017-11-10 DIAGNOSIS — N18.30 ANEMIA IN STAGE 3 CHRONIC KIDNEY DISEASE (H): ICD-10-CM

## 2017-11-10 DIAGNOSIS — D63.1 ANEMIA IN STAGE 3 CHRONIC KIDNEY DISEASE (H): ICD-10-CM

## 2017-11-10 DIAGNOSIS — Z94.4 HISTORY OF LIVER TRANSPLANT (H): ICD-10-CM

## 2017-11-10 DIAGNOSIS — N18.30 CKD (CHRONIC KIDNEY DISEASE) STAGE 3, GFR 30-59 ML/MIN (H): ICD-10-CM

## 2017-11-10 DIAGNOSIS — N18.4 CHRONIC KIDNEY DISEASE, STAGE 4 (SEVERE) (H): ICD-10-CM

## 2017-11-10 LAB
ALBUMIN SERPL-MCNC: 3.6 G/DL (ref 3.4–5)
ALP SERPL-CCNC: 241 U/L (ref 40–150)
ALT SERPL W P-5'-P-CCNC: 27 U/L (ref 0–70)
ANION GAP SERPL CALCULATED.3IONS-SCNC: 8 MMOL/L (ref 3–14)
AST SERPL W P-5'-P-CCNC: 19 U/L (ref 0–45)
BILIRUB DIRECT SERPL-MCNC: 0.1 MG/DL (ref 0–0.2)
BILIRUB SERPL-MCNC: 0.5 MG/DL (ref 0.2–1.3)
BUN SERPL-MCNC: 43 MG/DL (ref 7–30)
CALCIUM SERPL-MCNC: 8.5 MG/DL (ref 8.5–10.1)
CHLORIDE SERPL-SCNC: 109 MMOL/L (ref 94–109)
CO2 SERPL-SCNC: 18 MMOL/L (ref 20–32)
CREAT SERPL-MCNC: 1.68 MG/DL (ref 0.66–1.25)
ERYTHROCYTE [DISTWIDTH] IN BLOOD BY AUTOMATED COUNT: 16.1 % (ref 10–15)
FERRITIN SERPL-MCNC: 1824 NG/ML (ref 26–388)
GFR SERPL CREATININE-BSD FRML MDRD: 43 ML/MIN/1.7M2
GLUCOSE BLDC GLUCOMTR-MCNC: 267 MG/DL (ref 70–99)
GLUCOSE SERPL-MCNC: 280 MG/DL (ref 70–99)
HCT VFR BLD AUTO: 26.8 % (ref 40–53)
HGB BLD-MCNC: 8.6 G/DL (ref 13.3–17.7)
IRON SATN MFR SERPL: 55 % (ref 15–46)
IRON SERPL-MCNC: 106 UG/DL (ref 35–180)
MAGNESIUM SERPL-MCNC: 1.3 MG/DL (ref 1.6–2.3)
MCH RBC QN AUTO: 30.4 PG (ref 26.5–33)
MCHC RBC AUTO-ENTMCNC: 32.1 G/DL (ref 31.5–36.5)
MCV RBC AUTO: 95 FL (ref 78–100)
PHOSPHATE SERPL-MCNC: 3.4 MG/DL (ref 2.5–4.5)
PLATELET # BLD AUTO: 102 10E9/L (ref 150–450)
POTASSIUM SERPL-SCNC: 5.6 MMOL/L (ref 3.4–5.3)
PROT SERPL-MCNC: 8.3 G/DL (ref 6.8–8.8)
RBC # BLD AUTO: 2.83 10E12/L (ref 4.4–5.9)
SODIUM SERPL-SCNC: 135 MMOL/L (ref 133–144)
TIBC SERPL-MCNC: 192 UG/DL (ref 240–430)
WBC # BLD AUTO: 5.9 10E9/L (ref 4–11)

## 2017-11-10 RX ORDER — OXYCODONE HYDROCHLORIDE 5 MG/1
5 TABLET ORAL ONCE
Status: CANCELLED | OUTPATIENT
Start: 2017-11-10

## 2017-11-10 RX ORDER — OXYCODONE HYDROCHLORIDE 5 MG/1
5 TABLET ORAL
Qty: 15 TABLET | Refills: 0 | Status: SHIPPED | OUTPATIENT
Start: 2017-11-10 | End: 2017-12-01

## 2017-11-10 RX ORDER — DIAZEPAM 5 MG
10 TABLET ORAL ONCE
Status: COMPLETED | OUTPATIENT
Start: 2017-11-10 | End: 2017-11-10

## 2017-11-10 RX ADMIN — DIAZEPAM 10 MG: 5 TABLET ORAL at 14:06

## 2017-11-10 NOTE — TELEPHONE ENCOUNTER
Anemia Management Note  SUBJECTIVE/OBJECTIVE:  Referred by Jovanna Cazares CNP on 11/6/2017  Primary Diagnosis: Anemia in Chronic Kidney Disease (N18.3, D63.1)     Secondary Diagnosis:  Chronic Kidney Disease, Stage 3 (N18.3)   Hgb goal range:  9-10  Epo/Darbo:  None  Iron regimen: None  Labs exp: 11/7/18    Anemia Latest Ref Rng & Units 10/26/2017 10/27/2017 10/30/2017 11/1/2017 11/3/2017 11/7/2017 11/10/2017   DIPAK Dose - - 25 mcg - - - - -   Hemoglobin 13.3 - 17.7 g/dL 7.0(L) 6.9(LL) 8.4(L) 8.5(L) 8.0(L) 8.3(L) 8.6(L)   TSAT 15 - 46 % - - - - - - 55(H)   Ferritin 26 - 388 ng/mL - - - - - - 1824(H)   PRBCs - - 1 unit - - - - -      BP Readings from Last 3 Encounters:   11/10/17 (!) 148/93   11/07/17 (!) 159/91   11/01/17 151/87     Wt Readings from Last 2 Encounters:   11/10/17 181 lb 3.2 oz (82.2 kg)   11/07/17 181 lb 3.2 oz (82.2 kg)     25mcg aranesp given 10/27/17    ASSESSMENT:  Hgb:Not at goal - recommend continue aranesp 60mcg every 2 weeks  TSat: elevated at >50% Ferritin: Elevated (>1000ng/mL)    PLAN:  Continue aranesp injections; enter orders when determine where he will have these administered    Orders needed to be renewed (for next follow-up date) in EPIC: None    Iron labs due:  2/10    Plan discussed with:  ESTELLA Sauer  Plan provided by:  Sabine    NEXT FOLLOW-UP DATE:  11/15    Anemia Management Service  Sabine Sanchez,MikelD and Estefani Rosales CPhT  Phone: [phone]  Fax: [phone]

## 2017-11-10 NOTE — IP AVS SNAPSHOT
[facility]    [address] [place] [address]    Phone:  [phone]    Fax:  [phone]                                       After Visit Summary   11/10/2017    Camacho Bhagat    MRN: [id number]           After Visit Summary Signature Page     I have received my discharge instructions, and my questions have been answered. I have discussed any challenges I see with this plan with the nurse or doctor.    ..........................................................................................................................................  Patient/Patient Representative Signature      ..........................................................................................................................................  Patient Representative Print Name and Relationship to Patient    ..................................................               ................................................  Date                                            Time    ..........................................................................................................................................  Reviewed by Signature/Title    ...................................................              ..............................................  Date                                                            Time

## 2017-11-10 NOTE — OP NOTE
OPERATIVE REPORT   RESIDENT PHYSICIAN:  Giovanni Anderson MD PGY-4.        MEDICAL STUDENT:  Jesse Bell, MS-3.      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      PROCEDURE PERFORMED:     1.  Revision amputation, right index finger, finger tip of the right index finger   2.  V Y advancement flap, right index finger.      COMPLICATIONS:  None.      BLODD LOSS:  Less than 5 cc.      ANESTHESIA:  Local with 0.25% Marcaine.      IMPLANTS:  None.      BRIEF HISTORY:  This is Camacho Bhagat, a 52-year-old male who presented with a partial fingertip amputation and developed a dry gangrene of the fingertip itself.  After discussion regarding both operative and nonoperative management options with Dr. Noonan, the patient elected to proceed with the aforementioned surgery.  Risks and benefits of surgery were discussed at length with the patient and he elected to proceed with a revision amputation and V Y advancement.      OPERATIVE NOT:  Patient was brought to the preoperative holding area and identified per hospital policy.  Appropriate extremity was marked.  0.25% Marcaine was utilized under sterile conditions to perform a digital block of the index finger by Dr. Noonan.  No complications were encountered during this.  The patient was then transferred to the operating suite where he was placed supine on the operating table with the right hand out over a hand table.  The extremity was prepped and draped in the usual sterile fashion.  A timeout was then performed in which all personnel were identified per policy.  Extremity and procedure performed were identified utilizing preoperative history, consent, markings on the skin and imaging.  At this time, we were set to initiate our procedure.  We initiated our procedure by de-da the eschar that had formed  over the index fingertip with Adson pickups.  The distal third of the fingernail was also removed utilizing a combination of a [place] and a tenotomy scissors.  We then debrided the end of the distal phalanx and subcutaneous tissues surrounding it, which were immediately present under the eschar itself of any loose or necrotic appearing tissue.  Once this was performed, we resected approximately 1 or 2 mm of the distal phalanx utilizing a rongeur.  This was resected back to healthy bone that would not undermine the fingernail too much.  This performed, we then created a V flap with a 15 blade scalpel.  The flap was mobilized bluntly utilizing tenotomy scissors and advanced distally over the fingertip.  We then utilized a nylon suture to fix the flap in position making sure not to close the flap with too much tension.  Once the flap was distalized to its final position, we completed closure of the Y portion of the V Y advancement.  The flap was noted to be well perfused and pink after the final closure.  We then irrigated and dressed the wound.  The patient was then transferred to the postoperative care unit in stable condition.      POSTOPERATIVE CARE PLAN:  The patient will keep his finger dressed for 7 days  and then follow up with Dr. Noonan.  He should keep it elevated for edema control.  He should keep his dressing clean and dry.  He will discharged home with narcotic pain medications which he should take as prescribed.  Dr. Noonan was present and scrubbed for all critical portions of the case.

## 2017-11-10 NOTE — DISCHARGE INSTRUCTIONS
[facility]  Home Care Following Your Procedure  Call a doctor if you have signs of infection (fever, growing tenderness at the surgery site, a large amount of drainage or bleeding, severe pain, foul-smelling drainage, redness, swelling).  Your doctor is:  Dr. Momo Noonan, Orthopaedics: [phone]                                    Or dial [phone] and ask for the resident on call for:  Orthopaedics  For emergency care, call the:  [facility]: [phone] (TTY for hearing impaired: [phone])

## 2017-11-10 NOTE — IP AVS SNAPSHOT
MRN:[id number]                      After Visit Summary   11/10/2017    Camacho Bhagat    MRN: [id number]           Thank you!     Thank you for choosing [facility] for your care. Our goal is always to provide you with excellent care. Hearing back from our patients is one way we can continue to improve our services. Please take a few minutes to complete the written survey that you may receive in the mail after you visit with us. Thank you!        Patient Information     Date Of Birth          1965        About your hospital stay     You were admitted on:  November 10, 2017 You last received care in the:  [facility]    You were discharged on:  November 10, 2017       Who to Call     For medical emergencies, please call 911.  For non-urgent questions about your medical care, please call your primary care provider or clinic, None  For questions related to your surgery, please call your surgery clinic        Attending Provider     Provider Momo Brunson MD Hand Surgery       Primary Care Provider    Shay Kirkpatrick MD      After Care Instructions      Diet as Tolerated       Return to diet before surgery, unless instructed otherwise.            Discharge Instructions       Review outpatient procedure discharge instructions with patient as directed by Provider            No Dressing Change       No dressing change until follow up appointment.            Wound care       Do not immerse wound in water until sutures removed. Leave dressing on until follow-up. Do not soak or scrub - if you shower, cover dressing to keep dry.                  Your next 10 appointments already scheduled     Nov 17, 2017 11:40 AM CST   (Arrive by 11:25 AM)   POST-OP HAND with Momo Noonan MD   [facility] ([facility])    [address] [place] [id number] [phone]            Nov 20, 2017  2:45 PM CST    (Arrive by 2:30 PM)   Return Vascular Visit with Estefani Beal MD   [facility] ([facility])    [address]  3rd Floor  [facility] [address]   [phone]            Dec 01, 2017  8:45 AM CST   (Arrive by 8:30 AM)   Return Liver Transplant with Toñito Camejo MD   [facility] ([facility])    [address]  3rd Floor  [facility] [address]   [phone]            Dec 06, 2017 11:30 AM CST   Lab with UC LAB   [facility] ([facility])    [address]  1st Floor  [facility] [address]   [phone]            Dec 06, 2017  1:00 PM CST   (Arrive by 12:30 PM)   Return Visit with Cinda Cazares NP   [facility] ([facility])    [address] [place] [address]  [address] [facility] [address]   [phone]            Dec 13, 2017 10:30 AM CST   (Arrive by 10:15 AM)   Return Visit with Sara Dinh MD   [facility] ([facility])    [address]  [address] [facility] [address]   [phone]            Dec 13, 2017 12:30 PM CST   (Arrive by 12:15 PM)   PAC EVALUATION with Uc Pac Mary 5   [facility] ([facility])    [address]  [address] [facility] [address]   [phone]            Dec 13, 2017  1:30 PM CST   (Arrive by 1:15 PM)   PAC RN ASSESSMENT with Uc Pac Rn   [facility] ([facility])    [address]  [address] [facility] [address]   [phone]            Dec 13, 2017  2:00 PM CST   (Arrive by 1:45 PM)   PAC Anesthesia Consult with Uc Pac Anesthesiologist   [facility] ([facility])    [address] [place] [address]  [address] [facility] [address]   [phone]            Jan 05, 2018  "  Procedure with Sara Dinh MD   [facility] [place] Same Day [facility] (--)    [address]  [place] [address] [phone]              Further instructions from your care team       [facility]  Home Care Following Your Procedure  Call a doctor if you have signs of infection (fever, growing tenderness at the surgery site, a large amount of drainage or bleeding, severe pain, foul-smelling drainage, redness, swelling).  Your doctor is:  Dr. Momo Noonan, Orthopaedics: [phone]                                    Or dial [phone] and ask for the resident on call for:  Orthopaedics  For emergency care, call the:  [facility]: [phone] (TTY for hearing impaired: [phone])    Pending Results     No orders found from 11/8/2017 to 11/11/2017.            Admission Information     Date & Time Provider Department Dept. Phone    11/10/2017 Momo Noonan MD [facility] [phone]      Your Vitals Were     Blood Pressure Pulse Temperature Respirations Height Weight    146/87 80 98.2  F (36.8  C) (Temporal) 16 1.829 m (6' 0.01\") 82.2 kg (181 lb 3.2 oz)    Pulse Oximetry BMI (Body Mass Index)                100% 24.57 kg/m2          [facility] Information     [facility] gives you secure access to your electronic health record. If you see a primary care provider, you can also send messages to your care team and make appointments. If you have questions, please call your primary care clinic.  If you do not have a primary care provider, please call [phone] and they will assist you.      [facility] is an electronic gateway that provides easy, online access to your medical records. With [facility], you can request a clinic appointment, read your test results, renew a prescription or communicate with your care team.     To access your existing account, please contact your [facility] or call [phone] for " assistance.        Care EveryWhere ID     This is your Care EveryWhere ID. This could be used by other organizations to access your [facility] medical records  [id number]        Equal Access to Services     FRANKLIN PORITLLO : Todd Carlisle, troy gaston, danielle kazeb dolan, devi noelin hayaachristal learyelena cross rafa duckworth. So [facility] [phone].    ATENCIÓN: Si habla español, tiene a zheng disposición servicios gratuitos de asistencia lingüística. Llame al [phone].    We comply with applicable federal civil rights laws and [place] laws. We do not discriminate on the basis of race, color, national origin, age, disability, sex, sexual orientation, or gender identity.               Review of your medicines      CONTINUE these medicines which may have CHANGED, or have new prescriptions. If we are uncertain of the size of tablets/capsules you have at home, strength may be listed as something that might have changed.        Dose / Directions    amLODIPine 10 MG tablet   Commonly known as:  NORVASC   This may have changed:  when to take this   Used for:  HTN (hypertension)        Dose:  10 mg   Take 1 tablet (10 mg) by mouth daily   Quantity:  90 tablet   Refills:  3       mycophenolic acid 360 MG EC tablet   Commonly known as:  GENERIC EQUIVALENT   This may have changed:    - how much to take  - when to take this   Used for:  History of liver transplant (H), Long-term use of immunosuppressant medication        Dose:  360 mg   Take 1 tablet (360 mg) by mouth every 6 hours   Quantity:  120 tablet   Refills:  5       * oxyCODONE IR 10 MG tablet   Commonly known as:  ROXICODONE   This may have changed:  Another medication with the same name was added. Make sure you understand how and when to take each.   Used for:  Osteoarthritis        Dose:  10 mg   Take 1 tablet (10 mg) by mouth daily as needed for moderate to severe pain   Quantity:  30 tablet   Refills:  0       * oxyCODONE IR 5 MG tablet   Commonly  known as:  ROXICODONE   This may have changed:  You were already taking a medication with the same name, and this prescription was added. Make sure you understand how and when to take each.   Used for:  Gangrene of finger (H)        Dose:  5 mg   Take 1 tablet (5 mg) by mouth every 3 hours as needed for pain or other (Moderate to Severe)   Quantity:  15 tablet   Refills:  0       predniSONE 10 MG tablet   Commonly known as:  DELTASONE   This may have changed:  when to take this   Used for:  Liver transplant recipient (H), Long-term use of immunosuppressant medication        Dose:  10 mg   Take 1 tablet (10 mg) by mouth daily   Quantity:  30 tablet   Refills:  11       * Notice:  This list has 2 medication(s) that are the same as other medications prescribed for you. Read the directions carefully, and ask your doctor or other care provider to review them with you.      CONTINUE these medicines which have NOT CHANGED        Dose / Directions    CIALIS 5 MG tablet   Generic drug:  tadalafil        Dose:  5 mg   Take 5 mg by mouth as needed   Refills:  1       cyclobenzaprine 10 MG tablet   Commonly known as:  FLEXERIL   Used for:  Immunosuppression (H)        Dose:  10 mg   Take 1 tablet (10 mg) by mouth 3 times daily as needed for muscle spasms   Quantity:  90 tablet   Refills:  0       fludrocortisone 0.1 MG tablet   Commonly known as:  FLORINEF   Used for:  Hyperkalemia        Dose:  0.3 mg   Take 3 tablets (0.3 mg) by mouth daily   Quantity:  90 tablet   Refills:  3       furosemide 20 MG tablet   Commonly known as:  LASIX   Used for:  Hyperkalemia        Dose:  20 mg   Take 1 tablet (20 mg) by mouth 2 times daily   Quantity:  60 tablet   Refills:  3       GABAPENTIN PO        Dose:  300 mg   Take 300 mg by mouth 3 times daily   Refills:  0       insulin glargine 100 UNIT/ML injection   Commonly known as:  LANTUS        Dose:  50 Units   Inject 50 Units Subcutaneous every morning   Refills:  0       linagliptin 5  MG Tabs tablet   Commonly known as:  TRADJENTA        Dose:  5 mg   Take 5 mg by mouth every evening   Refills:  0       loperamide 2 MG capsule   Commonly known as:  IMODIUM   Used for:  S/P right hemicolectomy        Dose:  4 mg   Take 2 capsules (4 mg) by mouth 2 times daily   Quantity:  120 capsule   Refills:  3       magnesium oxide 400 (241.3 MG) MG tablet   Commonly known as:  MAG-OX   Used for:  Hypomagnesemia, CKD (chronic kidney disease) stage 3, GFR 30-59 ml/min        Dose:  400 mg   Take 1 tablet (400 mg) by mouth daily   Quantity:  90 tablet   Refills:  4       OMEPRAZOLE PO        Dose:  20 mg   Take 20 mg by mouth every morning   Refills:  0       sodium bicarbonate 650 MG tablet   Used for:  Hyperkalemia        Dose:  1300 mg   Take 2 tablets (1,300 mg) by mouth 3 times daily   Quantity:  180 tablet   Refills:  3       tacrolimus 1 MG capsule   Commonly known as:  GENERIC EQUIVALENT   Used for:  Liver transplant recipient (H)        Dose:  1 mg   Take 1 capsule (1 mg) by mouth every 12 hours   Quantity:  60 capsule   Refills:  11       VELTASSA 8.4 G packet   Used for:  Hyperkalemia   Generic drug:  patiromer        Dose:  8.4 g   Take 8.4 g by mouth daily   Quantity:  30 each   Refills:  3            Where to get your medicines      Some of these will need a paper prescription and others can be bought over the counter. Ask your nurse if you have questions.     Bring a paper prescription for each of these medications     oxyCODONE IR 5 MG tablet                Protect others around you: Learn how to safely use, store and throw away your medicines at www.disposemymeds.org.             Medication List: This is a list of all your medications and when to take them. Check marks below indicate your daily home schedule. Keep this list as a reference.      Medications           Morning Afternoon Evening Bedtime As Needed    amLODIPine 10 MG tablet   Commonly known as:  NORVASC   Take 1 tablet (10 mg) by  mouth daily                                CIALIS 5 MG tablet   Take 5 mg by mouth as needed   Generic drug:  tadalafil                                cyclobenzaprine 10 MG tablet   Commonly known as:  FLEXERIL   Take 1 tablet (10 mg) by mouth 3 times daily as needed for muscle spasms                                fludrocortisone 0.1 MG tablet   Commonly known as:  FLORINEF   Take 3 tablets (0.3 mg) by mouth daily                                furosemide 20 MG tablet   Commonly known as:  LASIX   Take 1 tablet (20 mg) by mouth 2 times daily                                GABAPENTIN PO   Take 300 mg by mouth 3 times daily                                insulin glargine 100 UNIT/ML injection   Commonly known as:  LANTUS   Inject 50 Units Subcutaneous every morning                                linagliptin 5 MG Tabs tablet   Commonly known as:  TRADJENTA   Take 5 mg by mouth every evening                                loperamide 2 MG capsule   Commonly known as:  IMODIUM   Take 2 capsules (4 mg) by mouth 2 times daily                                magnesium oxide 400 (241.3 MG) MG tablet   Commonly known as:  MAG-OX   Take 1 tablet (400 mg) by mouth daily                                mycophenolic acid 360 MG EC tablet   Commonly known as:  GENERIC EQUIVALENT   Take 1 tablet (360 mg) by mouth every 6 hours                                OMEPRAZOLE PO   Take 20 mg by mouth every morning                                * oxyCODONE IR 10 MG tablet   Commonly known as:  ROXICODONE   Take 1 tablet (10 mg) by mouth daily as needed for moderate to severe pain                                * oxyCODONE IR 5 MG tablet   Commonly known as:  ROXICODONE   Take 1 tablet (5 mg) by mouth every 3 hours as needed for pain or other (Moderate to Severe)                                predniSONE 10 MG tablet   Commonly known as:  DELTASONE   Take 1 tablet (10 mg) by mouth daily                                sodium bicarbonate 650 MG  tablet   Take 2 tablets (1,300 mg) by mouth 3 times daily                                tacrolimus 1 MG capsule   Commonly known as:  GENERIC EQUIVALENT   Take 1 capsule (1 mg) by mouth every 12 hours                                VELTASSA 8.4 G packet   Take 8.4 g by mouth daily   Generic drug:  patiromer                                * Notice:  This list has 2 medication(s) that are the same as other medications prescribed for you. Read the directions carefully, and ask your doctor or other care provider to review them with you.

## 2017-11-10 NOTE — BRIEF OP NOTE
Brief Orthopaedic Op Note    Date of Service: 11/10/2017    Attending Surgeon: Momo Noonan MD  Resident Surgeons: SHER Anderson    Pre-Op Diagnosis: Ischemia Right Index Finger  Post-Op Diagnosis: same  Procedures: Procedure(s):  RELEASE TRIGGER FINGER    EBL: * No values recorded between 11/10/2017  3:03 PM and 11/10/2017  3:27 PM *  Anesthesia: Local anesthesia  Blood Transfusion: none administered  Fluids: (See anesthesia record)  Urine Output: See Anesthesia Record  Drains: None  Specimens: None  Implants: None    Findings: None  Complications: None  Condition: Stable to PACU  Comments: See Full Dictated Operative Report for Full Details         Assessment and Plan:    Camacho Bhagat is a 52 year old male status-post:    Procedure:   Debridement of right index finger, V-Y flap    Wound care  Activity as tolerated  Follow-up: Clinic with Dr. Noonan in 1 weeks  for wound check    Disposition: Discharge home today    Jesse Bell, MS3  [employer]

## 2017-11-13 ENCOUNTER — TELEPHONE (OUTPATIENT)
Dept: SURGERY | Facility: CLINIC | Age: 52
End: 2017-11-13

## 2017-11-13 ENCOUNTER — CARE COORDINATION (OUTPATIENT)
Dept: NEPHROLOGY | Facility: CLINIC | Age: 52
End: 2017-11-13

## 2017-11-13 NOTE — PROGRESS NOTES
Patient returned call. Said he's doing well. No concerns with taking the Veltassa and is happy that his potassium levels are looking better. He confirmed that he started taking the magnesium oxide last Thursday. He will continue to get labs twice weekly.    Quang Conteh RN

## 2017-11-13 NOTE — TELEPHONE ENCOUNTER
This RN talked with [facility] and gave verbal order for the patients supplies. The paper order was also signed and sent over to [facility].

## 2017-11-13 NOTE — PROGRESS NOTES
Left VM for patient to return call to check how things are going with Daylin and see if he's started the magnesium oxide Rx.   Labs were checked on Friday last week. Magnesium is still low, but stable. Potassium a little elevated.     Quang Conteh RN

## 2017-11-13 NOTE — TELEPHONE ENCOUNTER
Camacho's mother is calling for Dr. Dinh.  She reports they are having a terrible time getting Rx for more ileostomy supplies.  Original Rx from Dr. Willingham is for 2 X per week.  Sometimes need 4-5 per week.  He is on medication that gives him diarrhea.  10/30 she spoke with [employer] and they faxed to Dr. Dinh.  He ran out of bags and needed to go to the ED for bag change even.  This is the second time she has had to pay out of pocket for supplies.  She is extremely frustrated!    Per Ragini, Dr. Dinh's clinic nurse.  His Rx request is not in Dr. Dinh's paperwork pile.  Either already sent back, or not received in the first place.  Have her call [employer] and ask them to call to speak with us directly.  We can do telephone order.  Contact information given to her, she verbalizes understanding and she will call [employer] and have them call us directly.

## 2017-11-14 ENCOUNTER — CARE COORDINATION (OUTPATIENT)
Dept: NEPHROLOGY | Facility: CLINIC | Age: 52
End: 2017-11-14

## 2017-11-14 ENCOUNTER — TELEPHONE (OUTPATIENT)
Dept: TRANSPLANT | Facility: CLINIC | Age: 52
End: 2017-11-14

## 2017-11-14 DIAGNOSIS — E83.42 HYPOMAGNESEMIA: ICD-10-CM

## 2017-11-14 DIAGNOSIS — N18.30 CKD (CHRONIC KIDNEY DISEASE) STAGE 3, GFR 30-59 ML/MIN (H): ICD-10-CM

## 2017-11-14 DIAGNOSIS — Z94.4 LIVER REPLACED BY TRANSPLANT (H): Primary | ICD-10-CM

## 2017-11-14 NOTE — PROGRESS NOTES
Reviewed patient's recent labs with Jovanna Foster NP. Magnesium still low. Verbal order received to increase magnesium oxide from 400 mg daily to 400 mg BID.     Called patient and left a detailed voice mail with these recommendations and asked that he call back if he has any questions. Patient still getting labs checked twice weekly, so will continue to monitor magnesium and other electrolytes.     Quang Conteh RN

## 2017-11-15 ENCOUNTER — TELEPHONE (OUTPATIENT)
Dept: PHARMACY | Facility: CLINIC | Age: 52
End: 2017-11-15

## 2017-11-15 ENCOUNTER — TELEPHONE (OUTPATIENT)
Dept: TRANSPLANT | Facility: CLINIC | Age: 52
End: 2017-11-15

## 2017-11-15 ENCOUNTER — HOSPITAL ENCOUNTER (OUTPATIENT)
Dept: LAB | Facility: CLINIC | Age: 52
Discharge: HOME | End: 2017-11-15
Attending: INTERNAL MEDICINE
Payer: COMMERCIAL

## 2017-11-15 DIAGNOSIS — N18.30 CKD (CHRONIC KIDNEY DISEASE) STAGE 3, GFR 30-59 ML/MIN (H): ICD-10-CM

## 2017-11-15 DIAGNOSIS — Z79.60 LONG-TERM USE OF IMMUNOSUPPRESSANT MEDICATION: ICD-10-CM

## 2017-11-15 DIAGNOSIS — N18.4 CHRONIC KIDNEY DISEASE, STAGE 4 (SEVERE) (H): ICD-10-CM

## 2017-11-15 DIAGNOSIS — Z94.4 HISTORY OF LIVER TRANSPLANT (H): ICD-10-CM

## 2017-11-15 DIAGNOSIS — N18.30 ANEMIA IN STAGE 3 CHRONIC KIDNEY DISEASE (H): ICD-10-CM

## 2017-11-15 DIAGNOSIS — D63.1 ANEMIA IN STAGE 3 CHRONIC KIDNEY DISEASE (H): ICD-10-CM

## 2017-11-15 LAB
ALBUMIN SERPL-MCNC: 3.2 G/DL (ref 3.4–5)
ALP SERPL-CCNC: 226 U/L (ref 40–150)
ALT SERPL W P-5'-P-CCNC: 28 U/L (ref 0–70)
ANION GAP SERPL CALCULATED.3IONS-SCNC: 7 MMOL/L (ref 3–14)
AST SERPL W P-5'-P-CCNC: 15 U/L (ref 0–45)
BILIRUB DIRECT SERPL-MCNC: 0.1 MG/DL (ref 0–0.2)
BILIRUB SERPL-MCNC: 0.3 MG/DL (ref 0.2–1.3)
BUN SERPL-MCNC: 38 MG/DL (ref 7–30)
CALCIUM SERPL-MCNC: 8.4 MG/DL (ref 8.5–10.1)
CHLORIDE SERPL-SCNC: 109 MMOL/L (ref 94–109)
CO2 SERPL-SCNC: 22 MMOL/L (ref 20–32)
CREAT SERPL-MCNC: 1.49 MG/DL (ref 0.66–1.25)
ERYTHROCYTE [DISTWIDTH] IN BLOOD BY AUTOMATED COUNT: 16.5 % (ref 10–15)
FERRITIN SERPL-MCNC: 1748 NG/ML (ref 26–388)
GFR SERPL CREATININE-BSD FRML MDRD: 49 ML/MIN/1.7M2
GLUCOSE SERPL-MCNC: 290 MG/DL (ref 70–99)
HCT VFR BLD AUTO: 25.7 % (ref 40–53)
HGB BLD-MCNC: 8.3 G/DL (ref 13.3–17.7)
IRON SATN MFR SERPL: 50 % (ref 15–46)
IRON SERPL-MCNC: 93 UG/DL (ref 35–180)
MAGNESIUM SERPL-MCNC: 1.6 MG/DL (ref 1.6–2.3)
MCH RBC QN AUTO: 30.6 PG (ref 26.5–33)
MCHC RBC AUTO-ENTMCNC: 32.3 G/DL (ref 31.5–36.5)
MCV RBC AUTO: 95 FL (ref 78–100)
PHOSPHATE SERPL-MCNC: 2.5 MG/DL (ref 2.5–4.5)
PLATELET # BLD AUTO: 84 10E9/L (ref 150–450)
POTASSIUM SERPL-SCNC: 5.2 MMOL/L (ref 3.4–5.3)
PROT SERPL-MCNC: 7.7 G/DL (ref 6.8–8.8)
RBC # BLD AUTO: 2.71 10E12/L (ref 4.4–5.9)
SODIUM SERPL-SCNC: 138 MMOL/L (ref 133–144)
TIBC SERPL-MCNC: 186 UG/DL (ref 240–430)
WBC # BLD AUTO: 5.4 10E9/L (ref 4–11)

## 2017-11-15 PROCEDURE — 84450 TRANSFERASE (AST) (SGOT): CPT

## 2017-11-15 PROCEDURE — 80076 HEPATIC FUNCTION PANEL: CPT

## 2017-11-15 PROCEDURE — 36415 COLL VENOUS BLD VENIPUNCTURE: CPT

## 2017-11-15 PROCEDURE — 82247 BILIRUBIN TOTAL: CPT

## 2017-11-15 PROCEDURE — 84075 ASSAY ALKALINE PHOSPHATASE: CPT

## 2017-11-15 PROCEDURE — 83550 IRON BINDING TEST: CPT

## 2017-11-15 PROCEDURE — 82728 ASSAY OF FERRITIN: CPT

## 2017-11-15 PROCEDURE — 82248 BILIRUBIN DIRECT: CPT

## 2017-11-15 PROCEDURE — 83735 ASSAY OF MAGNESIUM: CPT

## 2017-11-15 PROCEDURE — 84100 ASSAY OF PHOSPHORUS: CPT

## 2017-11-15 PROCEDURE — 84460 ALANINE AMINO (ALT) (SGPT): CPT

## 2017-11-15 PROCEDURE — 80069 RENAL FUNCTION PANEL: CPT

## 2017-11-15 PROCEDURE — 85027 COMPLETE CBC AUTOMATED: CPT

## 2017-11-15 PROCEDURE — 84155 ASSAY OF PROTEIN SERUM: CPT

## 2017-11-15 PROCEDURE — 83540 ASSAY OF IRON: CPT

## 2017-11-15 NOTE — TELEPHONE ENCOUNTER
Pt calling with many concerns:  Has been having problems with his ostomy bag, it isn't staying on, and the skin is reddened from exposure to the contents.  He states that he is having some diarrhea too.  Plan made with him to stop diflucan, and to continue taking tacrolimus 1 mg Q 12 hours, with myfortic 720mg Q 12 hours.    Plan made to review symptoms w/diarrhea with , Camacho will contact the ostomy nurse about problems with the ostomy bag.    Per reply from , plan made for pt to have stool cultures.

## 2017-11-15 NOTE — TELEPHONE ENCOUNTER
ESTELLA for pt providing information from txp coordinator's below notes. Instructed him to call wound/ostomy clinic to see if someone is able to see him while he's in clinic tomorrow and also stop in the lab to get supplies for stool cultures.

## 2017-11-15 NOTE — TELEPHONE ENCOUNTER
Spoke with Camacho at 08:30 on 11/15, who expresses concerns about his ostomy, diarrhea, etc.  Has been trying to reach us about this.  He did reach the colorectal clinic, and someone is helping him with his supplies.    He states that he wants to speak with the ostomy nurse, about he skin irritation and redness at the ostomy site.   He is coming to clinic tomorrow to be seen in f/u to his hand surgery.

## 2017-11-15 NOTE — TELEPHONE ENCOUNTER
Patient Call: Transplant   Reason for call: Clarification of medication the Anemia clinic wants Camacho to start Belatacept  Please call Camacho #[phone]

## 2017-11-16 ENCOUNTER — OFFICE VISIT (OUTPATIENT)
Dept: WOUND CARE | Facility: CLINIC | Age: 52
End: 2017-11-16

## 2017-11-16 DIAGNOSIS — R23.8 PAINFUL PERIWOUND SKIN: ICD-10-CM

## 2017-11-16 DIAGNOSIS — Z71.89 OSTOMY NURSE CONSULTATION: Primary | ICD-10-CM

## 2017-11-16 DIAGNOSIS — Z43.2 ENCOUNTER FOR ATTENTION TO ILEOSTOMY (H): ICD-10-CM

## 2017-11-16 NOTE — MR AVS SNAPSHOT
After Visit Summary   11/16/2017    Camacho Bhagat    MRN: [id number]           Patient Information     Date Of Birth          1965        Visit Information        Provider Department      11/16/2017 7:30 AM Roxanne Keen, WOJCIECH [facility]ound [facility]omy        Today's Diagnoses     Ostomy nurse consultation    -  1    Encounter for attention to ileostomy (H)          Care Instructions    Ana Rosa seal [id number]  Barrier strip Coloplast [id number]  [place]  barrier extender [id number]            Follow-ups after your visit        Your next 10 appointments already scheduled     Nov 17, 2017 11:40 AM CST   (Arrive by 11:25 AM)   POST-OP HAND with Momo Noonan MD   [facility] ([facility])    [address] [place] [address]  4th [facility] [address]   [phone]            Nov 20, 2017  2:45 PM CST   (Arrive by 2:30 PM)   Return Vascular Visit with Estefani Beal MD   [facility] ([facility])    [address] [place] [address]  3rd Floor  [facility] [address]   [phone]            Dec 01, 2017  8:45 AM CST   (Arrive by 8:30 AM)   Return Liver Transplant with Toñito Camejo MD   [facility] Hepatology ([facility])    [address] [place] [address]  3rd [facility] [address]   [phone]            Dec 06, 2017 11:30 AM CST   Lab with UC LAB   [facility] ([facility])    [address] [place] [address]  1st [facility] [address]   [phone]            Dec 06, 2017  1:00 PM CST   (Arrive by 12:30 PM)   Return Visit with Cinda Cazares NP   [facility] Nephrology ([facility])    [address] [place] [address]  3rd Floor  [facility] [address]   [phone]            Dec 13, 2017 10:30 AM CST   (Arrive by 10:15 AM)   Return Visit with Sara Dinh MD   [facility]al [facility] ([email or web address] [place])    [address] [place] [address]  [address] [address]   [phone]            Dec 13, 2017 12:30 PM CST   (Arrive by 12:15 PM)   PAC EVALUATION with Uc Pac Mary 5   [facility] Preoperative Assessment [place] ([facility])    [address] [place] [address]   [phone]            Dec 13, 2017  1:30 PM CST   (Arrive by 1:15 PM)   PAC RN ASSESSMENT with Jonathon Pac Rn   [facility] Preoperative Assessment [place] ([facility])    [address] [place] [address]   [phone]            Dec 13, 2017  2:00 PM CST   (Arrive by 1:45 PM)   PAC Anesthesia Consult with Jonathon Pac Anesthesiologist   [facility] Preoperative Assessment [place] ([facility])    [address] [place] [address]   [phone]            Jan 05, 2018   Procedure with Sara Dinh MD   [facility], [place], Same Day Surgery (--)    [address] [address] [phone]              Who to contact     Please call your clinic at [phone] to:    Ask questions about your health    Make or cancel appointments    Discuss your medicines    Learn about your test results    Speak to your doctor   If you have compliments or concerns about an experience at your clinic, or if you wish to file a complaint, please contact [facility] Patient Relations at [phone] or email us at [email or web address]         Additional Information About Your Visit        TeblaharQnary Information     Heroic gives you secure access to your electronic health record. If you see a primary care provider, you can also send messages to your care team and make appointments. If you have questions, please call your primary care clinic.  If you do not have a primary care provider, please call [phone] and they will assist you.      Heroic is an electronic gateway that provides easy, online access to  your medical records. With [employer], you can request a clinic appointment, read your test results, renew a prescription or communicate with your care team.     To access your existing account, please contact your [facility] or call [phone] for assistance.        Care EveryWhere ID     This is your Care EveryWhere ID. This could be used by other organizations to access your [place] medical records  [id number]         Blood Pressure from Last 3 Encounters:   11/10/17 146/87   11/07/17 (!) 159/91   11/01/17 151/87    Weight from Last 3 Encounters:   11/10/17 82.2 kg (181 lb 3.2 oz)   11/07/17 82.2 kg (181 lb 3.2 oz)   11/03/17 86.4 kg (190 lb 7.6 oz)              Today, you had the following     No orders found for display         Today's Medication Changes          These changes are accurate as of: 11/16/17  8:23 AM.  If you have any questions, ask your nurse or doctor.               These medicines have changed or have updated prescriptions.        Dose/Directions    amLODIPine 10 MG tablet   Commonly known as:  NORVASC   This may have changed:  when to take this   Used for:  HTN (hypertension)        Dose:  10 mg   Take 1 tablet (10 mg) by mouth daily   Quantity:  90 tablet   Refills:  3       mycophenolic acid 360 MG EC tablet   Commonly known as:  GENERIC EQUIVALENT   This may have changed:    - how much to take  - when to take this   Used for:  History of liver transplant (H), Long-term use of immunosuppressant medication        Dose:  360 mg   Take 1 tablet (360 mg) by mouth every 6 hours   Quantity:  120 tablet   Refills:  5       predniSONE 10 MG tablet   Commonly known as:  DELTASONE   This may have changed:  when to take this   Used for:  Liver transplant recipient (H), Long-term use of immunosuppressant medication        Dose:  10 mg   Take 1 tablet (10 mg) by mouth daily   Quantity:  30 tablet   Refills:  11                Primary Care Provider    Shay Kirkpatrick MD        [place] [address]        Equal Access to Services     [facility]: Hadii tavo swartz marijaradha Manojali, wakpda luqadaha, qaybta kaalmada magdaleno, devi duckworth. So [facility] [phone].    ATENCIÓN: Si habla español, tiene a zheng disposición servicios gratuitos de asistencia lingüística. Vitalyame al [phone].    We comply with applicable federal civil rights laws and [place] laws. We do not discriminate on the basis of race, color, national origin, age, disability, sex, sexual orientation, or gender identity.            Thank you!     Thank you for choosing [facility] OSTOM[facility]  for your care. Our goal is always to provide you with excellent care. Hearing back from our patients is one way we can continue to improve our services. Please take a few minutes to complete the written survey that you may receive in the mail after your visit with us. Thank you!             Your Updated Medication List - Protect others around you: Learn how to safely use, store and throw away your medicines at www.disposemymeds.org.          This list is accurate as of: 11/16/17  8:23 AM.  Always use your most recent med list.                   Brand Name Dispense Instructions for use Diagnosis    amLODIPine 10 MG tablet    NORVASC    90 tablet    Take 1 tablet (10 mg) by mouth daily    HTN (hypertension)       CIALIS 5 MG tablet   Generic drug:  tadalafil      Take 5 mg by mouth as needed        cyclobenzaprine 10 MG tablet    FLEXERIL    90 tablet    Take 1 tablet (10 mg) by mouth 3 times daily as needed for muscle spasms    Immunosuppression (H)       fludrocortisone 0.1 MG tablet    FLORINEF    90 tablet    Take 3 tablets (0.3 mg) by mouth daily    Hyperkalemia       furosemide 20 MG tablet    LASIX    60 tablet    Take 1 tablet (20 mg) by mouth 2 times daily    Hyperkalemia       GABAPENTIN PO      Take 300 mg by mouth 3 times daily        insulin glargine 100 UNIT/ML injection    LANTUS     Inject 50 Units  Subcutaneous every morning        linagliptin 5 MG Tabs tablet    TRADJENTA     Take 5 mg by mouth every evening        loperamide 2 MG capsule    IMODIUM    120 capsule    Take 2 capsules (4 mg) by mouth 2 times daily    S/P right hemicolectomy       magnesium oxide 400 (241.3 MG) MG tablet    MAG-OX    180 tablet    Take 1 tablet (400 mg) by mouth 2 times daily    Hypomagnesemia, CKD (chronic kidney disease) stage 3, GFR 30-59 ml/min       mycophenolic acid 360 MG EC tablet    GENERIC EQUIVALENT    120 tablet    Take 1 tablet (360 mg) by mouth every 6 hours    History of liver transplant (H), Long-term use of immunosuppressant medication       OMEPRAZOLE PO      Take 20 mg by mouth every morning        * oxyCODONE IR 10 MG tablet    ROXICODONE    30 tablet    Take 1 tablet (10 mg) by mouth daily as needed for moderate to severe pain    Osteoarthritis       * oxyCODONE IR 5 MG tablet    ROXICODONE    15 tablet    Take 1 tablet (5 mg) by mouth every 3 hours as needed for pain or other (Moderate to Severe)    Gangrene of finger (H)       predniSONE 10 MG tablet    DELTASONE    30 tablet    Take 1 tablet (10 mg) by mouth daily    Liver transplant recipient (H), Long-term use of immunosuppressant medication       sodium bicarbonate 650 MG tablet     180 tablet    Take 2 tablets (1,300 mg) by mouth 3 times daily    Hyperkalemia       tacrolimus 1 MG capsule    GENERIC EQUIVALENT    60 capsule    Take 1 capsule (1 mg) by mouth every 12 hours    Liver transplant recipient (H)       VELTASSA 8.4 G packet   Generic drug:  patiromer     30 each    Take 8.4 g by mouth daily    Hyperkalemia       * Notice:  This list has 2 medication(s) that are the same as other medications prescribed for you. Read the directions carefully, and ask your doctor or other care provider to review them with you.

## 2017-11-16 NOTE — PROGRESS NOTES
"WO Ostomy Assessment  Patient comes to clinic for consultation regarding ostomy issues.    He is here with motherand ostomy care is provided by parent//guardian   Dx related to ostomy:Colon perforation  Consulted per Dr Duy Dinh  Subjective:  Patient is complaining of \"skin breakdown\"    Objective:  Type: Ileostomy  Stoma: 1 1/8  healthy, normal-appearing, pink-red, round, moist, good turgor and protruberant  Mucutaneous junction: intact,   Peristomal skin: erythema and erosion of epidermis  and barrier is leaking   Output: brown ,soft    Location: right   Hernia Belt measurement done: No  Wear time average:0-1 days  Received home care WOC assessment after discharge:No    Current pouch system/supplies: one piece, cut to fit, flat, barrier ring and ostomy powder    Assessment:   Mom called in September for a request for 20 pouches because the rx. Pt never received Home care. RX from the office was signed for 8 pouches   pt had called [facility] \" Call center told pt to ask for 30  Instead. But pt needed just 20 [facility] told pt that you needed to call C&R team, and we told pt to call [facility].  We apparently did receive the rx from handi at least 3 x. Pt was told b [facility] that \"they don't have approval to get more pouches\" Letter send to [facility] and C&R team to always sign for 20 pouches.   Pt's mom  is using good technique but [place] ring is not holding up. Ana Rosa ring recommended  gradula dip near the bellybutton. I think the new ring and changing it every other day will help    Intervention/Plan:  Letter send to [facility] and C&R team to always sign for 20 pouches.   Pt's mom  is using good technique but [place] ring is not holding up. Ana Rosa ring recommended. Do not use skin wipes  gradula dip near the bellybutton. I think the new ring and changing it every other day will help    Return demonstration: No  Face to face time = 1 hour    Caryn Busch NP was available for supervision of care if needed or if " questions should arise and regarding plan of care. Roxanne Keen RN CWON

## 2017-11-17 ENCOUNTER — TELEPHONE (OUTPATIENT)
Dept: PHARMACY | Facility: CLINIC | Age: 52
End: 2017-11-17

## 2017-11-17 ENCOUNTER — OFFICE VISIT (OUTPATIENT)
Dept: ORTHOPEDICS | Facility: CLINIC | Age: 52
End: 2017-11-17

## 2017-11-17 VITALS — WEIGHT: 181 LBS | BODY MASS INDEX: 24.52 KG/M2 | HEIGHT: 72 IN

## 2017-11-17 DIAGNOSIS — E87.5 HYPERKALEMIA: ICD-10-CM

## 2017-11-17 DIAGNOSIS — I99.8 ISCHEMIC FINGER: Primary | ICD-10-CM

## 2017-11-17 NOTE — TELEPHONE ENCOUNTER
Per Dr Hernández, hold off on DIPAK while hgb remains in the 8's. Will check iron studies and hgb in one month. He is aware.    Call date: 12/12    Sabine Sanchez, PharmD, [facility]  [phone]  November 17, 2017

## 2017-11-17 NOTE — MR AVS SNAPSHOT
After Visit Summary   11/17/2017    Camacho Bhagat    MRN: [id number]           Patient Information     Date Of Birth          1965        Visit Information        Provider Department      11/17/2017 11:40 AM Momo Noonan MD [facility]        Today's Diagnoses     Ischemic finger    -  1       Follow-ups after your visit        Your next 10 appointments already scheduled     Nov 20, 2017  2:45 PM CST   (Arrive by 2:30 PM)   Return Vascular Visit with Estefani Beal MD   [facility] ([facility])    [address] [place] [address]  3rd [facility] [address]   [phone]            Dec 01, 2017  8:45 AM CST   (Arrive by 8:30 AM)   Return Liver Transplant with Toñito Camejo MD   [facility] ([facility])    [address] [place] [address]   [phone]            Dec 06, 2017 11:30 AM CST   Lab with UC LAB   [facility] ([facility])    [address] [place] [address]   [phone]            Dec 06, 2017  1:00 PM CST   (Arrive by 12:30 PM)   Return Visit with Cinda Cazares NP   [facility] Nephrology ([facility])    [address] [place] [address]   [phone]            Dec 13, 2017 10:30 AM CST   (Arrive by 10:15 AM)   Return Visit with Sara Dinh MD   [facility] Rectal Surgery ([facility])    [address] [place] [address]   [phone]            Dec 13, 2017 12:30 PM CST   (Arrive by 12:15 PM)   PAC EVALUATION with Uc Pac [facility] 5   [facility] ([facility])    [address] [place] [address]   [phone]            Dec 13, 2017  1:30 PM CST   (Arrive by 1:15 PM)   PAC RN ASSESSMENT with Uc Pac Rn     [facility] Preoperative [facility] ([facility] [place])    [address] [facility] [address]  4th Floor  [facility] [address]   [phone]            Dec 13, 2017  2:00 PM CST   (Arrive by 1:45 PM)   PAC Anesthesia Consult with  Pac Anesthesiologist   [facility] Preoperative [facility] ([facility] [place])    [address]  4th Floor  [facility] [address]   [phone]            Jan 05, 2018   Procedure with Sara Dinh MD   [facility], [place], Same Day Surgery (--)    [address] [facility] [address] [phone]              Who to contact     Please call your clinic at [phone] to:    Ask questions about your health    Make or cancel appointments    Discuss your medicines    Learn about your test results    Speak to your doctor   If you have compliments or concerns about an experience at your clinic, or if you wish to file a complaint, please contact [facility] Patient Relations at [phone] or email us at [email or web address]         Additional Information About Your Visit        [facility] Information     EPS gives you secure access to your electronic health record. If you see a primary care provider, you can also send messages to your care team and make appointments. If you have questions, please call your primary care clinic.  If you do not have a primary care provider, please call [phone] and they will assist you.      EPS is an electronic gateway that provides easy, online access to your medical records. With EPS, you can request a clinic appointment, read your test results, renew a prescription or communicate with your care team.     To access your existing account, please contact your [facility] or call [phone] for assistance.        Care EveryWhere ID     This is your Care EveryWhere ID. This could be used by other organizations to access your  [place] medical records  [id number]        Your Vitals Were     Height BMI (Body Mass Index)                1.829 m (6') 24.55 kg/m2           Blood Pressure from Last 3 Encounters:   11/10/17 146/87   11/07/17 (!) 159/91   11/01/17 151/87    Weight from Last 3 Encounters:   11/17/17 82.1 kg (181 lb)   11/10/17 82.2 kg (181 lb 3.2 oz)   11/07/17 82.2 kg (181 lb 3.2 oz)              Today, you had the following     No orders found for display         Today's Medication Changes          These changes are accurate as of: 11/17/17 12:11 PM.  If you have any questions, ask your nurse or doctor.               These medicines have changed or have updated prescriptions.        Dose/Directions    amLODIPine 10 MG tablet   Commonly known as:  NORVASC   This may have changed:  when to take this   Used for:  HTN (hypertension)        Dose:  10 mg   Take 1 tablet (10 mg) by mouth daily   Quantity:  90 tablet   Refills:  3       mycophenolic acid 360 MG EC tablet   Commonly known as:  GENERIC EQUIVALENT   This may have changed:    - how much to take  - when to take this   Used for:  History of liver transplant (H), Long-term use of immunosuppressant medication        Dose:  360 mg   Take 1 tablet (360 mg) by mouth every 6 hours   Quantity:  120 tablet   Refills:  5       predniSONE 10 MG tablet   Commonly known as:  DELTASONE   This may have changed:  when to take this   Used for:  Liver transplant recipient (H), Long-term use of immunosuppressant medication        Dose:  10 mg   Take 1 tablet (10 mg) by mouth daily   Quantity:  30 tablet   Refills:  11            Where to get your medicines      These medications were sent to [email or web address] [address] [address]  [address] [facility] [address]     Phone:  [phone]     patiromer 8.4 G packet                Primary Care Provider    Shay Kirkpatrick MD       PWB  [place] [address]        Equal Access to Services     [facility]  GAAR : Hadii aad ku melinda [place], waaxda luqadaha, qaybta kaalmada adesammi, devi noelin hayaachristal macias tay rafa . So [facility] [phone].    ATENCIÓN: Si habla meenuañol, tiene a zheng disposición servicios gratuitos de asistencia lingüística. Llame al [phone].    We comply with applicable federal civil rights laws and [place] laws. We do not discriminate on the basis of race, color, national origin, age, disability, sex, sexual orientation, or gender identity.            Thank you!     Thank you for choosing [facility]  for your care. Our goal is always to provide you with excellent care. Hearing back from our patients is one way we can continue to improve our services. Please take a few minutes to complete the written survey that you may receive in the mail after your visit with us. Thank you!             Your Updated Medication List - Protect others around you: Learn how to safely use, store and throw away your medicines at www.disposemymeds.org.          This list is accurate as of: 11/17/17 12:11 PM.  Always use your most recent med list.                   Brand Name Dispense Instructions for use Diagnosis    amLODIPine 10 MG tablet    NORVASC    90 tablet    Take 1 tablet (10 mg) by mouth daily    HTN (hypertension)       CIALIS 5 MG tablet   Generic drug:  tadalafil      Take 5 mg by mouth as needed        cyclobenzaprine 10 MG tablet    FLEXERIL    90 tablet    Take 1 tablet (10 mg) by mouth 3 times daily as needed for muscle spasms    Immunosuppression (H)       fludrocortisone 0.1 MG tablet    FLORINEF    90 tablet    Take 3 tablets (0.3 mg) by mouth daily    Hyperkalemia       furosemide 20 MG tablet    LASIX    60 tablet    Take 1 tablet (20 mg) by mouth 2 times daily    Hyperkalemia       GABAPENTIN PO      Take 300 mg by mouth 3 times daily        insulin glargine 100 UNIT/ML injection    LANTUS     Inject 50 Units Subcutaneous every morning        linagliptin 5 MG Tabs  tablet    TRADJENTA     Take 5 mg by mouth every evening        loperamide 2 MG capsule    IMODIUM    120 capsule    Take 2 capsules (4 mg) by mouth 2 times daily    S/P right hemicolectomy       magnesium oxide 400 (241.3 MG) MG tablet    MAG-OX    180 tablet    Take 1 tablet (400 mg) by mouth 2 times daily    Hypomagnesemia, CKD (chronic kidney disease) stage 3, GFR 30-59 ml/min       mycophenolic acid 360 MG EC tablet    GENERIC EQUIVALENT    120 tablet    Take 1 tablet (360 mg) by mouth every 6 hours    History of liver transplant (H), Long-term use of immunosuppressant medication       OMEPRAZOLE PO      Take 20 mg by mouth every morning        * oxyCODONE IR 10 MG tablet    ROXICODONE    30 tablet    Take 1 tablet (10 mg) by mouth daily as needed for moderate to severe pain    Osteoarthritis       * oxyCODONE IR 5 MG tablet    ROXICODONE    15 tablet    Take 1 tablet (5 mg) by mouth every 3 hours as needed for pain or other (Moderate to Severe)    Gangrene of finger (H)       patiromer 8.4 G packet    VELTASSA    30 each    Take 8.4 g by mouth daily    Hyperkalemia       predniSONE 10 MG tablet    DELTASONE    30 tablet    Take 1 tablet (10 mg) by mouth daily    Liver transplant recipient (H), Long-term use of immunosuppressant medication       sodium bicarbonate 650 MG tablet     180 tablet    Take 2 tablets (1,300 mg) by mouth 3 times daily    Hyperkalemia       tacrolimus 1 MG capsule    GENERIC EQUIVALENT    60 capsule    Take 1 capsule (1 mg) by mouth every 12 hours    Liver transplant recipient (H)       * Notice:  This list has 2 medication(s) that are the same as other medications prescribed for you. Read the directions carefully, and ask your doctor or other care provider to review them with you.

## 2017-11-17 NOTE — PROGRESS NOTES
Camacho is here for follow-up of his right index finger, status post V-Y flap. Minimal pain. No fevers or chills.    Physical examination of the right hand demonstrates a vascular flap with excellent capillary refill. The wounds are healing nicely. No signs of any infection. FDP is functional as is the terminal slip.There is a palpable radial pulse and brisk capillary refill. No overlying skin changes, no erythema, no wounds or signs of infection. There is full sensation to light touch throughout. No motor deficits noted.    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 It's too early to remove the sutures though. We'll start with dressing changes once a day and local wound care, he can wash and bathe the finger now. We'll see him back in a week or so to remove the sutures.

## 2017-11-17 NOTE — NURSING NOTE
Reason For Visit:   Chief Complaint   Patient presents with     Surgical Followup     S/P right index finger surgery. DOS: 11/10/17       Primary MD: Daquan Kenney T  Ref. MD: DAQUAN KENNEY     Age: 52 year old    ?  No      Ht 1.829 m (6')  Wt 82.1 kg (181 lb)  BMI 24.55 kg/m2      Pain Assessment  Patient Currently in Pain: Yes  0-10 Pain Scale: 7  Primary Pain Location: Finger (Comment which one) (index finger)  Pain Orientation: Right  Pain Descriptors: Sharp  Aggravating Factors: Movement               QuickDASH Assessment  QuickDASH Main 11/2/2017   1.Open a tight or new jar. No difficulty   2. Do heavy household chores (e.g., wash walls, floors) Unable to answer   3. Carry a shopping bag or briefcase. Unable to answer   4. Wash your back. Unable to answer   5. Use a knife to cut food. No difficulty   6. Recreational activities in which you take some force or impact through your arm, shoulder or hand (e.g., golf, hammering, tennis, etc.). No difficulty   7. During the past week, to what extent has your arm, shoulder or hand problem interfered with your normal social activities with family, friends, neighbours or groups? Slightly   8. During the past week, were you limited in your work or other regular daily activities as a result of your arm, shoulder or hand problem? Slightly limited   9. Arm, shoulder or hand pain. Severe   10.Tingling (pins and needles) in your arm,shoulder or hand. None   11. During the past week, how much difficulty have you had sleeping because of the pain in your arm, shoulder or hand? ([place] number) No difficulty   Quickdash Ability Score 4.54          Current Outpatient Prescriptions   Medication Sig Dispense Refill     patiromer (VELTASSA) 8.4 G packet Take 8.4 g by mouth daily 30 each 3     magnesium oxide (MAG-OX) 400 (241.3 MG) MG tablet Take 1 tablet (400 mg) by mouth 2 times daily 180 tablet 3     oxyCODONE IR (ROXICODONE) 5 MG tablet Take 1 tablet (5 mg) by mouth  every 3 hours as needed for pain or other (Moderate to Severe) 15 tablet 0     tacrolimus (GENERIC EQUIVALENT) 1 MG capsule Take 1 capsule (1 mg) by mouth every 12 hours 60 capsule 11     fludrocortisone (FLORINEF) 0.1 MG tablet Take 3 tablets (0.3 mg) by mouth daily 90 tablet 3     amLODIPine (NORVASC) 10 MG tablet Take 1 tablet (10 mg) by mouth daily (Patient taking differently: Take 10 mg by mouth every morning ) 90 tablet 3     oxyCODONE IR (ROXICODONE) 10 MG tablet Take 1 tablet (10 mg) by mouth daily as needed for moderate to severe pain 30 tablet 0     CIALIS 5 MG tablet Take 5 mg by mouth as needed   1     furosemide (LASIX) 20 MG tablet Take 1 tablet (20 mg) by mouth 2 times daily 60 tablet 3     predniSONE (DELTASONE) 10 MG tablet Take 1 tablet (10 mg) by mouth daily (Patient taking differently: Take 10 mg by mouth every morning ) 30 tablet 11     mycophenolic acid (GENERIC EQUIVALENT) 360 MG EC tablet Take 1 tablet (360 mg) by mouth every 6 hours (Patient taking differently: Take 720 mg by mouth 2 times daily ) 120 tablet 5     sodium bicarbonate 650 MG tablet Take 2 tablets (1,300 mg) by mouth 3 times daily 180 tablet 3     OMEPRAZOLE PO Take 20 mg by mouth every morning        GABAPENTIN PO Take 300 mg by mouth 3 times daily       insulin glargine (LANTUS) 100 UNIT/ML injection Inject 50 Units Subcutaneous every morning       linagliptin (TRADJENTA) 5 MG TABS tablet Take 5 mg by mouth every evening        loperamide (IMODIUM) 2 MG capsule Take 2 capsules (4 mg) by mouth 2 times daily 120 capsule 3     cyclobenzaprine (FLEXERIL) 10 MG tablet Take 1 tablet (10 mg) by mouth 3 times daily as needed for muscle spasms 90 tablet 0       Allergies   Allergen Reactions     Erythromycin GI Disturbance     Vioxx      Nausea, vomiting       Judith Wood, ATC'

## 2017-11-17 NOTE — LETTER
11/17/2017       RE: Camacho Bhagat  [address] [facility] [address]     Dear Colleague,    Thank you for referring your patient, Camacho Bhagat, to the [facility]THOP[facility] at [facility]. Please see a copy of my visit note below.    Camacho is here for follow-up of his right index finger, status post V-Y flap. Minimal pain. No fevers or chills.    Physical examination of the right hand demonstrates a vascular flap with excellent capillary refill. The wounds are healing nicely. No signs of any infection. FDP is functional as is the terminal slip.There is a palpable radial pulse and brisk capillary refill. No overlying skin changes, no erythema, no wounds or signs of infection. There is full sensation to light touch throughout. No motor deficits noted.    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 It's too early to remove the sutures though. We'll start with dressing changes once a day and local wound care, he can wash and bathe the finger now. We'll see him back in a week or so to remove the sutures.    Again, thank you for allowing me to participate in the care of your patient.      Sincerely,    Momo Noonan MD

## 2017-11-17 NOTE — TELEPHONE ENCOUNTER
----- Message from Ninfa Hernández MD sent at 11/17/2017 11:04 AM CST -----  Regarding: RE: DIPAK concerns  Hemoglobin stable at 8 so we can hold off at this time.  Thanks Sabine!  ----- Message -----     From: Sabine Sanchez [facility]     Sent: 11/17/2017   9:03 AM       To: Toñito Camejo MD, Ninfa Hernández MD, #  Subject: FW: DIPAK concerns                                 Following up on my message below...when you have a moment, please let me know how you would like me to proceed.    Thank you,  Sabine  ----- Message -----     From: Sabine Sanchez [facility]     Sent: 11/15/2017   2:15 PM       To: Toñito Camejo MD, Ninfa Hernández MD, #  Subject: DIPAK concerns                                     Jovanna Monge Sarah:    I was referred Mr Bhagat, and will initiate aranesp for hgb<9. He has several concerns about using DIPAK. In particular, h/o clots and placement of IVC filter, and h/o hepatocelullar carcinoma.     Please let me know:    (1) If you would like me to proceed with DIPAK initiation.   (2) Confirm hgb target: standard 9-10, or other  (3) Dose would be aranesp 60mcg every 2 weeks per protocol, or indicate other    Thanks!  Sabine

## 2017-11-17 NOTE — TELEPHONE ENCOUNTER
Have not heard back from his providers on how/if to proceed with Aranesp.  Will send another message.     Call date: 11/21    Sabine Sanchez, PharmD, [facility]  [phone]  November 17, 2017

## 2017-11-20 ENCOUNTER — OFFICE VISIT (OUTPATIENT)
Dept: VASCULAR SURGERY | Facility: CLINIC | Age: 52
End: 2017-11-20

## 2017-11-20 VITALS
SYSTOLIC BLOOD PRESSURE: 148 MMHG | HEART RATE: 91 BPM | OXYGEN SATURATION: 99 % | RESPIRATION RATE: 20 BRPM | DIASTOLIC BLOOD PRESSURE: 85 MMHG

## 2017-11-20 DIAGNOSIS — Z79.60 LONG-TERM USE OF IMMUNOSUPPRESSANT MEDICATION: ICD-10-CM

## 2017-11-20 DIAGNOSIS — Z94.4 HISTORY OF LIVER TRANSPLANT (H): ICD-10-CM

## 2017-11-20 DIAGNOSIS — N18.4 CHRONIC KIDNEY DISEASE, STAGE 4 (SEVERE) (H): ICD-10-CM

## 2017-11-20 DIAGNOSIS — Z94.4 LIVER TRANSPLANT RECIPIENT (H): ICD-10-CM

## 2017-11-20 DIAGNOSIS — N18.30 CKD (CHRONIC KIDNEY DISEASE) STAGE 3, GFR 30-59 ML/MIN (H): ICD-10-CM

## 2017-11-20 DIAGNOSIS — N18.30 ANEMIA IN STAGE 3 CHRONIC KIDNEY DISEASE (H): ICD-10-CM

## 2017-11-20 DIAGNOSIS — D63.1 ANEMIA IN STAGE 3 CHRONIC KIDNEY DISEASE (H): ICD-10-CM

## 2017-11-20 DIAGNOSIS — I73.9 PVD (PERIPHERAL VASCULAR DISEASE) (H): Primary | ICD-10-CM

## 2017-11-20 LAB
ALBUMIN SERPL-MCNC: 3.3 G/DL (ref 3.4–5)
ALP SERPL-CCNC: 166 U/L (ref 40–150)
ALT SERPL W P-5'-P-CCNC: 20 U/L (ref 0–70)
ANION GAP SERPL CALCULATED.3IONS-SCNC: 7 MMOL/L (ref 3–14)
AST SERPL W P-5'-P-CCNC: 13 U/L (ref 0–45)
BILIRUB DIRECT SERPL-MCNC: 0.1 MG/DL (ref 0–0.2)
BILIRUB SERPL-MCNC: 0.3 MG/DL (ref 0.2–1.3)
BUN SERPL-MCNC: 33 MG/DL (ref 7–30)
CALCIUM SERPL-MCNC: 8 MG/DL (ref 8.5–10.1)
CHLORIDE SERPL-SCNC: 108 MMOL/L (ref 94–109)
CO2 SERPL-SCNC: 23 MMOL/L (ref 20–32)
CREAT SERPL-MCNC: 1.25 MG/DL (ref 0.66–1.25)
ERYTHROCYTE [DISTWIDTH] IN BLOOD BY AUTOMATED COUNT: 16.6 % (ref 10–15)
GFR SERPL CREATININE-BSD FRML MDRD: 60 ML/MIN/1.7M2
GLUCOSE SERPL-MCNC: 278 MG/DL (ref 70–99)
HCT VFR BLD AUTO: 25.7 % (ref 40–53)
HGB BLD-MCNC: 8.4 G/DL (ref 13.3–17.7)
MAGNESIUM SERPL-MCNC: 1.3 MG/DL (ref 1.6–2.3)
MCH RBC QN AUTO: 30.8 PG (ref 26.5–33)
MCHC RBC AUTO-ENTMCNC: 32.7 G/DL (ref 31.5–36.5)
MCV RBC AUTO: 94 FL (ref 78–100)
PHOSPHATE SERPL-MCNC: 2.5 MG/DL (ref 2.5–4.5)
PLATELET # BLD AUTO: 78 10E9/L (ref 150–450)
POTASSIUM SERPL-SCNC: 4.8 MMOL/L (ref 3.4–5.3)
PROT SERPL-MCNC: 7.4 G/DL (ref 6.8–8.8)
RBC # BLD AUTO: 2.73 10E12/L (ref 4.4–5.9)
SODIUM SERPL-SCNC: 138 MMOL/L (ref 133–144)
WBC # BLD AUTO: 5.5 10E9/L (ref 4–11)

## 2017-11-20 RX ORDER — MYCOPHENOLIC ACID 360 MG/1
720 TABLET, DELAYED RELEASE ORAL EVERY 12 HOURS
Qty: 120 TABLET | Refills: 3 | Status: SHIPPED | OUTPATIENT
Start: 2017-11-20 | End: 2018-10-04

## 2017-11-20 ASSESSMENT — PAIN SCALES - GENERAL: PAINLEVEL: SEVERE PAIN (6)

## 2017-11-20 NOTE — NURSING NOTE
Chief Complaint   Patient presents with     RECHECK     Right foot great toe        Vitals:    11/20/17 1432   BP: 148/85   BP Location: Left arm   Pulse: 91   Resp: 20   SpO2: 99%       There is no height or weight on file to calculate BMI.            Leela Villasenor LPN

## 2017-11-20 NOTE — LETTER
11/20/2017      RE: Camacho MOORE Olayinka  [address] [facility] [address]       Vascular surgery staff    Doing great  Right great toe dry gangrene healing.  Areas snipped off and debrided back  No signs of infection  Follow up on Dec 11  MD Estefani Camacho MD

## 2017-11-20 NOTE — PROGRESS NOTES
Vascular surgery staff    Doing great  Right great toe dry gangrene healing.  Areas snipped off and debrided back  No signs of infection  Follow up on Dec 11  Estefani Beal MD    Answers for HPI/ROS submitted by the patient on 11/20/2017   General Symptoms: No  Skin Symptoms: No  HENT Symptoms: No  EYE SYMPTOMS: No  HEART SYMPTOMS: No  LUNG SYMPTOMS: No  INTESTINAL SYMPTOMS: No  URINARY SYMPTOMS: No  REPRODUCTIVE SYMPTOMS: No  SKELETAL SYMPTOMS: No  BLOOD SYMPTOMS: No  NERVOUS SYSTEM SYMPTOMS: No  MENTAL HEALTH SYMPTOMS: No

## 2017-11-20 NOTE — LETTER
11/20/2017       RE: Camacho Bhagat  [address] [facility] [address]     Dear Colleague,    Thank you for referring your patient, Camacho Bhagat, to the [facility] at [facility]. Please see a copy of my visit note below.    Vascular surgery staff    Doing great  Right great toe dry gangrene healing.  Areas snipped off and debrided back  No signs of infection  Follow up on Dec 11  Estefani Beal MD    Answers for HPI/ROS submitted by the patient on 11/20/2017   General Symptoms: No  Skin Symptoms: No  HENT Symptoms: No  EYE SYMPTOMS: No  HEART SYMPTOMS: No  LUNG SYMPTOMS: No  INTESTINAL SYMPTOMS: No  URINARY SYMPTOMS: No  REPRODUCTIVE SYMPTOMS: No  SKELETAL SYMPTOMS: No  BLOOD SYMPTOMS: No  NERVOUS SYSTEM SYMPTOMS: No  MENTAL HEALTH SYMPTOMS: No      Again, thank you for allowing me to participate in the care of your patient.      Sincerely,    Estefani Beal MD

## 2017-11-20 NOTE — MR AVS SNAPSHOT
After Visit Summary   11/20/2017    Camacho Bhagat    MRN: [id number]           Patient Information     Date Of Birth          1965        Visit Information        Provider Department      11/20/2017 2:45 PM Estefani Beal MD [facility]        Care Instructions    Continue twice dressing changes to right great toe, paint with betadine and cover with gauze           Follow-ups after your visit        Follow-up notes from your care team     Return in about 3 years (around 11/20/2020).      Your next 10 appointments already scheduled     Nov 20, 2017  3:00 PM CST   LAB with  LAB   [facility] ([facility])    [address] [place] [address]  1st [facility] [id number] [phone]           Please do not eat 10-12 hours before your appointment if you are coming in fasting for labs on lipids, cholesterol, or glucose (sugar). This does not apply to pregnant women. Water, hot tea and black coffee (with nothing added) are okay. Do not drink other fluids, diet soda or chew gum.            Nov 28, 2017 11:30 AM CST   (Arrive by 11:15 AM)   Post-Op with  U Ortho Nurse   [facility]opaedic [facility] ([facility])    [address] [place] [address]  [address]th [facility] [address] [phone]            Dec 01, 2017  8:45 AM CST   (Arrive by 8:30 AM)   Return Liver Transplant with Toñito Camejo MD   [facility] Hepatology ([facility])    [address] [place] [address]  3rd [facility] [address]   [phone]            Dec 06, 2017 11:30 AM CST   Lab with  LAB   [facility] ([facility])    [address] [place] [id number] [phone]            Dec 06, 2017  1:00 PM CST   (Arrive by 12:30 PM)   Return Visit with Cinda Cazares NP   [facility] Nephrology ([facility])    [address] [place] [address]    [phone]            Dec 11, 2017  2:45 PM CST   (Arrive by 2:30 PM)   Return Vascular Visit with Estefani Beal MD   [facility] Vascular [facility] ([facility])    [address] [place] [id number] [phone]            Dec 13, 2017 10:30 AM CST   (Arrive by 10:15 AM)   Return Visit with Sara Dinh MD   [facility] Colon and Rectal Surgery ([facility])    [address] [place] [id number] [phone]            Dec 13, 2017 12:30 PM CST   (Arrive by 12:15 PM)   PAC EVALUATION with [facility] Pac [facility] 5   [facility] Preoperative Assessment [facility] ([facility])    [address] [place] [id number] [phone]            Dec 13, 2017  1:30 PM CST   (Arrive by 1:15 PM)   PAC RN ASSESSMENT with Uc Pac Rn   [facility] Preoperative [facility] ([facility])    [address] [place] [id number] [phone]            Dec 13, 2017  2:00 PM CST   (Arrive by 1:45 PM)   PAC Anesthesia Consult with Uc Pac Anesthesiologist   [facility] Preoperative [facility] ([facility])    [address] [place] [id number] [phone]              Who to contact     Please call your clinic at [phone] to:    Ask questions about your health    Make or cancel appointments    Discuss your medicines    Learn about your test results    Speak to your doctor   If you have compliments or concerns about an experience at your clinic, or if you wish to file a complaint, please contact [facility] Patient Relations at [phone] or email us at [email or web address]         Additional Information About Your Visit        MyChart Information     [facility] gives you secure access to your electronic health record. If you see a primary care  provider, you can also send messages to your care team and make appointments. If you have questions, please call your primary care clinic.  If you do not have a primary care provider, please call [phone] and they will assist you.      [employer] is an electronic gateway that provides easy, online access to your medical records. With [employer], you can request a clinic appointment, read your test results, renew a prescription or communicate with your care team.     To access your existing account, please contact your [facility] or call [phone] for assistance.        Care EveryWhere ID     This is your Care EveryWhere ID. This could be used by other organizations to access your [place] medical records  [id number]        Your Vitals Were     Pulse Respirations Pulse Oximetry             91 20 99%          Blood Pressure from Last 3 Encounters:   11/20/17 148/85   11/10/17 146/87   11/07/17 (!) 159/91    Weight from Last 3 Encounters:   11/17/17 82.1 kg (181 lb)   11/10/17 82.2 kg (181 lb 3.2 oz)   11/07/17 82.2 kg (181 lb 3.2 oz)              Today, you had the following     No orders found for display         Today's Medication Changes          These changes are accurate as of: 11/20/17  2:46 PM.  If you have any questions, ask your nurse or doctor.               These medicines have changed or have updated prescriptions.        Dose/Directions    amLODIPine 10 MG tablet   Commonly known as:  NORVASC   This may have changed:  when to take this   Used for:  HTN (hypertension)        Dose:  10 mg   Take 1 tablet (10 mg) by mouth daily   Quantity:  90 tablet   Refills:  3       mycophenolic acid 360 MG EC tablet   Commonly known as:  GENERIC EQUIVALENT   This may have changed:    - how much to take  - when to take this   Used for:  History of liver transplant (H), Long-term use of immunosuppressant medication        Dose:  360 mg   Take 1 tablet (360 mg) by mouth every 6 hours    Quantity:  120 tablet   Refills:  5       predniSONE 10 MG tablet   Commonly known as:  DELTASONE   This may have changed:  when to take this   Used for:  Liver transplant recipient (H), Long-term use of immunosuppressant medication        Dose:  10 mg   Take 1 tablet (10 mg) by mouth daily   Quantity:  30 tablet   Refills:  11                Primary Care Provider Office Phone # Fax #    Shay Kirkpatrick [phone]       [address] [place] [address]41  [facility] [place] [address]        Equal Access to Services     FRANKLIN PORTILLO : Hadii aad ku hadasho Soomaali, waaxda luqadaha, qaybta kaalmada adeegyada, waxay idiin hayaan adeelena craig . [facility] [phone].    ATENCIÓN: Si habla español, tiene a zheng disposición servicios gratuitos de asistencia lingüística. [facility] [phone].    We comply with applicable federal civil rights laws and [place] laws. We do not discriminate on the basis of race, color, national origin, age, disability, sex, sexual orientation, or gender identity.            Thank you!     Thank you for choosing [facility] V[facility]ULAR [facility]IC  for your care. Our goal is always to provide you with excellent care. Hearing back from our patients is one way we can continue to improve our services. Please take a few minutes to complete the written survey that you may receive in the mail after your visit with us. Thank you!             Your Updated Medication List - Protect others around you: Learn how to safely use, store and throw away your medicines at www.disposemymeds.org.          This list is accurate as of: 11/20/17  2:46 PM.  Always use your most recent med list.                   Brand Name Dispense Instructions for use Diagnosis    amLODIPine 10 MG tablet    NORVASC    90 tablet    Take 1 tablet (10 mg) by mouth daily    HTN (hypertension)       CIALIS 5 MG tablet   Generic drug:  tadalafil      Take 5 mg by mouth as needed        cyclobenzaprine 10 MG tablet    FLEXERIL    90 tablet     Take 1 tablet (10 mg) by mouth 3 times daily as needed for muscle spasms    Immunosuppression (H)       fludrocortisone 0.1 MG tablet    FLORINEF    90 tablet    Take 3 tablets (0.3 mg) by mouth daily    Hyperkalemia       furosemide 20 MG tablet    LASIX    60 tablet    Take 1 tablet (20 mg) by mouth 2 times daily    Hyperkalemia       GABAPENTIN PO      Take 300 mg by mouth 3 times daily        insulin glargine 100 UNIT/ML injection    LANTUS     Inject 50 Units Subcutaneous every morning        linagliptin 5 MG Tabs tablet    TRADJENTA     Take 5 mg by mouth every evening        loperamide 2 MG capsule    IMODIUM    120 capsule    Take 2 capsules (4 mg) by mouth 2 times daily    S/P right hemicolectomy       magnesium oxide 400 (241.3 MG) MG tablet    MAG-OX    180 tablet    Take 1 tablet (400 mg) by mouth 2 times daily    Hypomagnesemia, CKD (chronic kidney disease) stage 3, GFR 30-59 ml/min       mycophenolic acid 360 MG EC tablet    GENERIC EQUIVALENT    120 tablet    Take 1 tablet (360 mg) by mouth every 6 hours    History of liver transplant (H), Long-term use of immunosuppressant medication       OMEPRAZOLE PO      Take 20 mg by mouth every morning        * oxyCODONE IR 10 MG tablet    ROXICODONE    30 tablet    Take 1 tablet (10 mg) by mouth daily as needed for moderate to severe pain    Osteoarthritis       * oxyCODONE IR 5 MG tablet    ROXICODONE    15 tablet    Take 1 tablet (5 mg) by mouth every 3 hours as needed for pain or other (Moderate to Severe)    Gangrene of finger (H)       patiromer 8.4 G packet    VELTASSA    30 each    Take 8.4 g by mouth daily    Hyperkalemia       predniSONE 10 MG tablet    DELTASONE    30 tablet    Take 1 tablet (10 mg) by mouth daily    Liver transplant recipient (H), Long-term use of immunosuppressant medication       sodium bicarbonate 650 MG tablet     180 tablet    Take 2 tablets (1,300 mg) by mouth 3 times daily    Hyperkalemia       tacrolimus 1 MG capsule     GENERIC EQUIVALENT    60 capsule    Take 1 capsule (1 mg) by mouth every 12 hours    Liver transplant recipient (H)       * Notice:  This list has 2 medication(s) that are the same as other medications prescribed for you. Read the directions carefully, and ask your doctor or other care provider to review them with you.

## 2017-11-20 NOTE — TELEPHONE ENCOUNTER
Please call Camacho to reduce prednisone dose to 5mg every day,  He was put on prednisone 10mg as he was tapered off tacrolimus.  For now he will continue on 1mg tacrolimus, myfortic 720mg both twice a day, and prednisone can be reduced to 5mg q day.

## 2017-11-21 RX ORDER — PREDNISONE 5 MG/1
5 TABLET ORAL DAILY
Qty: 30 TABLET | Refills: 11 | Status: SHIPPED | OUTPATIENT
Start: 2017-11-21 | End: 2018-02-05

## 2017-11-21 NOTE — TELEPHONE ENCOUNTER
LM for pt instructing him to decrease prednisone to 5 mg daily. Requested return phone call to confirm dose change.

## 2017-11-22 ENCOUNTER — TELEPHONE (OUTPATIENT)
Dept: TRANSPLANT | Facility: CLINIC | Age: 52
End: 2017-11-22

## 2017-11-22 NOTE — TELEPHONE ENCOUNTER
Dr Camejo's response:    Toñito Camejo MD Hillman, Lisa A, [facility]                     He needs the DIPAK.  His concerns are only theoretical.         Sent message to Dr Hernández for OK to proceed with plan to check hgb 12/12, then can discuss need for DIPAK.    Sabine Sanchez, PharmD, [facility]  [phone]  November 22, 2017

## 2017-11-27 ASSESSMENT — ENCOUNTER SYMPTOMS
ARTHRALGIAS: 1
MUSCLE WEAKNESS: 0
JOINT SWELLING: 0
MYALGIAS: 0
MUSCLE CRAMPS: 1
BACK PAIN: 1
STIFFNESS: 1
NECK PAIN: 1

## 2017-11-28 ENCOUNTER — OFFICE VISIT (OUTPATIENT)
Dept: SURGERY | Facility: CLINIC | Age: 52
End: 2017-11-28

## 2017-11-28 ENCOUNTER — OFFICE VISIT (OUTPATIENT)
Dept: ORTHOPEDICS | Facility: CLINIC | Age: 52
End: 2017-11-28

## 2017-11-28 DIAGNOSIS — Z79.60 LONG-TERM USE OF IMMUNOSUPPRESSANT MEDICATION: ICD-10-CM

## 2017-11-28 DIAGNOSIS — Z94.4 HISTORY OF LIVER TRANSPLANT (H): ICD-10-CM

## 2017-11-28 DIAGNOSIS — K62.3 RECTAL PROLAPSE: Primary | ICD-10-CM

## 2017-11-28 DIAGNOSIS — Z98.890 POSTOPERATIVE STATE: Primary | ICD-10-CM

## 2017-11-28 LAB
ALBUMIN SERPL-MCNC: 3.1 G/DL (ref 3.4–5)
ALP SERPL-CCNC: 211 U/L (ref 40–150)
ALT SERPL W P-5'-P-CCNC: 38 U/L (ref 0–70)
ANION GAP SERPL CALCULATED.3IONS-SCNC: 7 MMOL/L (ref 3–14)
AST SERPL W P-5'-P-CCNC: 31 U/L (ref 0–45)
BILIRUB DIRECT SERPL-MCNC: 0.1 MG/DL (ref 0–0.2)
BILIRUB SERPL-MCNC: 0.4 MG/DL (ref 0.2–1.3)
BUN SERPL-MCNC: 39 MG/DL (ref 7–30)
CALCIUM SERPL-MCNC: 8.6 MG/DL (ref 8.5–10.1)
CHLORIDE SERPL-SCNC: 107 MMOL/L (ref 94–109)
CO2 SERPL-SCNC: 24 MMOL/L (ref 20–32)
CREAT SERPL-MCNC: 1.29 MG/DL (ref 0.66–1.25)
ERYTHROCYTE [DISTWIDTH] IN BLOOD BY AUTOMATED COUNT: 15.8 % (ref 10–15)
GFR SERPL CREATININE-BSD FRML MDRD: 58 ML/MIN/1.7M2
GLUCOSE SERPL-MCNC: 110 MG/DL (ref 70–99)
HCT VFR BLD AUTO: 26.8 % (ref 40–53)
HGB BLD-MCNC: 8.6 G/DL (ref 13.3–17.7)
MAGNESIUM SERPL-MCNC: 1.7 MG/DL (ref 1.6–2.3)
MCH RBC QN AUTO: 30.7 PG (ref 26.5–33)
MCHC RBC AUTO-ENTMCNC: 32.1 G/DL (ref 31.5–36.5)
MCV RBC AUTO: 96 FL (ref 78–100)
PHOSPHATE SERPL-MCNC: 3.2 MG/DL (ref 2.5–4.5)
PLATELET # BLD AUTO: 80 10E9/L (ref 150–450)
POTASSIUM SERPL-SCNC: 5.2 MMOL/L (ref 3.4–5.3)
PROT SERPL-MCNC: 6.8 G/DL (ref 6.8–8.8)
RBC # BLD AUTO: 2.8 10E12/L (ref 4.4–5.9)
SODIUM SERPL-SCNC: 138 MMOL/L (ref 133–144)
WBC # BLD AUTO: 4.9 10E9/L (ref 4–11)

## 2017-11-28 NOTE — LETTER
11/28/2017       RE: Camacho Bhagat  [address] [facility] [address]     Dear Colleague,    Thank you for referring your patient, Camacho Bhagat, to the [facility] COLON AND RECTAL SURGERY at [facility]. Please see a copy of my visit note below.    Patient presented as a walk in to clinic today with stoma prolapse. 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 Edematous but no necrosis or bleeding. After application of sugar, this was able to be manually reduced, which he tolerated well. Will have him wear a binder to try to prevent recurrent prolapse. He can apply sugar or honey at home and reduce if it starts to prolapse again but if this is not effective or if he develops any pain, necrosis, or bleeding, return to clinic.      Again, thank you for allowing me to participate in the care of your patient.      Sincerely,    LAMIN Lozano CNP

## 2017-11-28 NOTE — MR AVS SNAPSHOT
After Visit Summary   11/28/2017    Camacho Bhagat    MRN: [id number]           Patient Information     Date Of Birth          1965        Visit Information        Provider Department      11/28/2017 11:30 AM Nurse, Maria Luisa U [facility]        Today's Diagnoses     Postoperative state    -  1       Follow-ups after your visit        Your next 10 appointments already scheduled     Dec 01, 2017  8:45 AM CST   (Arrive by 8:30 AM)   Return Liver Transplant with Toñito Camejo MD   [facility] Hepatology ([facility])    [address] [place] [address]   [phone]            Dec 06, 2017 11:30 AM CST   Lab with MARIA LUISA LAB   [facility] Lab ([facility])    [address] [place] [address]   [phone]            Dec 06, 2017  1:00 PM CST   (Arrive by 12:30 PM)   Return Visit with Cinda Cazares NP   [facility] Nephrology ([facility])    [address] [place] [address]   [phone]            Dec 11, 2017  2:45 PM CST   (Arrive by 2:30 PM)   Return Vascular Visit with Estefani Beal MD   [facility] Vascular Clinic ([facility])    [address] [place] [address]   [phone]            Dec 13, 2017 10:30 AM CST   (Arrive by 10:15 AM)   Return Visit with Sara Dinh MD   [facility] Colon and Rectal Surgery ([facility])    [address] [place] [address]   [phone]            Dec 13, 2017 12:30 PM CST   (Arrive by 12:15 PM)   PAC EVALUATION with Uc Pac Mary 5   [facility] Preoperative Assessment Center ([facility])    [address] [place] [address]   [phone]            Dec 13, 2017  1:30 PM CST   (Arrive by 1:15 PM)   PAC RN ASSESSMENT with Uc Pac Rn   [facility]  Preoperative Assessment Center ([facility])    [address]  4th Floor  [facility] [address]   [phone]            Dec 13, 2017  2:00 PM CST   (Arrive by 1:45 PM)   PAC Anesthesia Consult with  Pac Anesthesiologist   [facility] Preoperative Assessment Center ([facility])    [address]  4th [facility] [address] [phone]            Dec 15, 2017  8:40 AM CST   (Arrive by 8:25 AM)   RETURN HAND with Momo Noonan MD   [facility] Orthopaedic Clinic ([facility])    [address]  [address] [facility] [address] [phone]            Jan 05, 2018   Procedure with Sara Dinh MD   [facility] [place] Same Day Surgery (--)    [address] [address] [phone]              Who to contact     Please call your clinic at [phone] to:    Ask questions about your health    Make or cancel appointments    Discuss your medicines    Learn about your test results    Speak to your doctor   If you have compliments or concerns about an experience at your clinic, or if you wish to file a complaint, please contact [facility] Patient Relations at [phone] or email us at [email or web address]         Additional Information About Your Visit        [employer] Information     [employer] gives you secure access to your electronic health record. If you see a primary care provider, you can also send messages to your care team and make appointments. If you have questions, please call your primary care clinic.  If you do not have a primary care provider, please call [phone] and they will assist you.      [employer] is an electronic gateway that provides easy, online access to your medical records. With [employer], you can request a clinic appointment, read your test results, renew a prescription or communicate with your care team.     To access  your existing account, please contact your [facility] or call [phone] for assistance.        Care EveryWhere ID     This is your Care EveryWhere ID. This could be used by other organizations to access your [facility] medical records  [id number]         Blood Pressure from Last 3 Encounters:   11/20/17 148/85   11/10/17 146/87   11/07/17 (!) 159/91    Weight from Last 3 Encounters:   11/17/17 82.1 kg (181 lb)   11/10/17 82.2 kg (181 lb 3.2 oz)   11/07/17 82.2 kg (181 lb 3.2 oz)              Today, you had the following     No orders found for display         Today's Medication Changes          These changes are accurate as of: 11/28/17 12:40 PM.  If you have any questions, ask your nurse or doctor.               These medicines have changed or have updated prescriptions.        Dose/Directions    amLODIPine 10 MG tablet   Commonly known as:  NORVASC   This may have changed:  when to take this   Used for:  HTN (hypertension)        Dose:  10 mg   Take 1 tablet (10 mg) by mouth daily   Quantity:  90 tablet   Refills:  3                Primary Care Provider Office Phone # Fax #    Shay Kirkpatrick [phone]       [address] [place] [address]        Equal Access to Services     FRANKLIN PORTILLO AH: Todd duttono Sojose, waaxda luqadaha, qaybta kaalmada adeegyada, devi duckworth. So [facility] [phone].    ATENCIÓN: Si habla español, tiene a zheng disposición servicios gratMemorial Medical Centeros de asistencia lingüística. Llame al [phone].    We comply with applicable federal civil rights laws and [place] laws. We do not discriminate on the basis of race, color, national origin, age, disability, sex, sexual orientation, or gender identity.            Thank you!     Thank you for choosing [facility]  for your care. Our goal is always to provide you with excellent care. Hearing back from our patients is one way we can  continue to improve our services. Please take a few minutes to complete the written survey that you may receive in the mail after your visit with us. Thank you!             Your Updated Medication List - Protect others around you: Learn how to safely use, store and throw away your medicines at www.disposemymeds.org.          This list is accurate as of: 11/28/17 12:40 PM.  Always use your most recent med list.                   Brand Name Dispense Instructions for use Diagnosis    amLODIPine 10 MG tablet    NORVASC    90 tablet    Take 1 tablet (10 mg) by mouth daily    HTN (hypertension)       CIALIS 5 MG tablet   Generic drug:  tadalafil      Take 5 mg by mouth as needed        cyclobenzaprine 10 MG tablet    FLEXERIL    90 tablet    Take 1 tablet (10 mg) by mouth 3 times daily as needed for muscle spasms    Immunosuppression (H)       fludrocortisone 0.1 MG tablet    FLORINEF    90 tablet    Take 3 tablets (0.3 mg) by mouth daily    Hyperkalemia       furosemide 20 MG tablet    LASIX    60 tablet    Take 1 tablet (20 mg) by mouth 2 times daily    Hyperkalemia       GABAPENTIN PO      Take 300 mg by mouth 3 times daily        insulin glargine 100 UNIT/ML injection    LANTUS     Inject 50 Units Subcutaneous every morning        linagliptin 5 MG Tabs tablet    TRADJENTA     Take 5 mg by mouth every evening        loperamide 2 MG capsule    IMODIUM    120 capsule    Take 2 capsules (4 mg) by mouth 2 times daily    S/P right hemicolectomy       magnesium oxide 400 (241.3 MG) MG tablet    MAG-OX    180 tablet    Take 1 tablet (400 mg) by mouth 2 times daily    Hypomagnesemia, CKD (chronic kidney disease) stage 3, GFR 30-59 ml/min       mycophenolic acid 360 MG EC tablet    GENERIC EQUIVALENT    120 tablet    Take 2 tablets (720 mg) by mouth every 12 hours    History of liver transplant (H), Long-term use of immunosuppressant medication       OMEPRAZOLE PO      Take 20 mg by mouth every morning        * oxyCODONE IR 10  MG tablet    ROXICODONE    30 tablet    Take 1 tablet (10 mg) by mouth daily as needed for moderate to severe pain    Osteoarthritis       * oxyCODONE IR 5 MG tablet    ROXICODONE    15 tablet    Take 1 tablet (5 mg) by mouth every 3 hours as needed for pain or other (Moderate to Severe)    Gangrene of finger (H)       patiromer 8.4 G packet    VELTASSA    30 each    Take 8.4 g by mouth daily    Hyperkalemia       predniSONE 5 MG tablet    DELTASONE    30 tablet    Take 1 tablet (5 mg) by mouth daily    Liver transplant recipient (H), Long-term use of immunosuppressant medication       sodium bicarbonate 650 MG tablet     180 tablet    Take 2 tablets (1,300 mg) by mouth 3 times daily    Hyperkalemia       tacrolimus 1 MG capsule    GENERIC EQUIVALENT    60 capsule    Take 1 capsule (1 mg) by mouth every 12 hours    Liver transplant recipient (H)       * Notice:  This list has 2 medication(s) that are the same as other medications prescribed for you. Read the directions carefully, and ask your doctor or other care provider to review them with you.

## 2017-11-28 NOTE — MR AVS SNAPSHOT
After Visit Summary   11/28/2017    aCmacho Bhagat    MRN: [id number]           Patient Information     Date Of Birth          1965        Visit Information        Provider Department      11/28/2017 12:30 PM Caryn Parikh APRN CNP [facility] Colon and Rectal Surgery        Today's Diagnoses     Rectal prolapse    -  1       Follow-ups after your visit        Your next 10 appointments already scheduled     Nov 28, 2017  1:30 PM CST   LAB with  LAB   [facility] Lab ([facility])    [address] [place] [id number] [phone]           Please do not eat 10-12 hours before your appointment if you are coming in fasting for labs on lipids, cholesterol, or glucose (sugar). This does not apply to pregnant women. Water, hot tea and black coffee (with nothing added) are okay. Do not drink other fluids, diet soda or chew gum.            Dec 01, 2017  8:45 AM CST   (Arrive by 8:30 AM)   Return Liver Transplant with Toñito Camejo MD   [facility] Hepatology ([facility])    [address] [place] [id number] [phone]            Dec 06, 2017 11:30 AM CST   Lab with  LAB   [facility] Lab ([facility])    [address] [place] [id number] [phone]            Dec 06, 2017  1:00 PM CST   (Arrive by 12:30 PM)   Return Visit with Cinda Cazares NP   [facility] Nephrology ([facility])    [address] [place] [id number] [phone]            Dec 11, 2017  2:45 PM CST   (Arrive by 2:30 PM)   Return Vascular Visit with Estefani Beal MD   [facility] Vascular Clinic ([facility])    [address] [place] [id number] [phone]            Dec 13, 2017 10:30 AM CST   (Arrive by 10:15 AM)   Return Visit with Sara Dinh MD    [facility] Colon and Rectal Surgery ([facility])    [address] [place] [address]   [phone]            Dec 13, 2017 12:30 PM CST   (Arrive by 12:15 PM)   PAC EVALUATION with Uc Pac Mary 5   [facility] [place] ([facility])    [address] [place] [address]   [phone]            Dec 13, 2017  1:30 PM CST   (Arrive by 1:15 PM)   PAC RN ASSESSMENT with Uc Pac Rn   [facility] [place] ([facility])    [address] [place] [address]   [phone]            Dec 13, 2017  2:00 PM CST   (Arrive by 1:45 PM)   PAC Anesthesia Consult with Uc Pac Anesthesiologist   [facility] [place] ([facility])    [address] [place] [address]   [phone]            Dec 15, 2017 10:30 AM CST   (Arrive by 10:15 AM)   RETURN HAND with Momo Noonan MD   [facility] Orthopaedic Clinic ([facility])    [address] [place] [address]   [phone]              Who to contact     Please call your clinic at [phone] to:    Ask questions about your health    Make or cancel appointments    Discuss your medicines    Learn about your test results    Speak to your doctor   If you have compliments or concerns about an experience at your clinic, or if you wish to file a complaint, please contact [facility] Patient Relations at [phone] or email us at [email or web address]         Additional Information About Your Visit        MyChart Information     [employer] gives you secure access to your electronic health record. If you see a primary care provider, you can also send messages to your care team and make appointments. If you have questions, please call your primary care  clinic.  If you do not have a primary care provider, please call [phone] and they will assist you.      [employer] is an electronic gateway that provides easy, online access to your medical records. With [employer], you can request a clinic appointment, read your test results, renew a prescription or communicate with your care team.     To access your existing account, please contact your [facility] or call [phone] for assistance.        Care EveryWhere ID     This is your Care EveryWhere ID. This could be used by other organizations to access your [place] medical records  [id number]         Blood Pressure from Last 3 Encounters:   11/20/17 148/85   11/10/17 146/87   11/07/17 (!) 159/91    Weight from Last 3 Encounters:   11/17/17 181 lb   11/10/17 181 lb 3.2 oz   11/07/17 181 lb 3.2 oz              Today, you had the following     No orders found for display         Today's Medication Changes          These changes are accurate as of: 11/28/17  1:03 PM.  If you have any questions, ask your nurse or doctor.               These medicines have changed or have updated prescriptions.        Dose/Directions    amLODIPine 10 MG tablet   Commonly known as:  NORVASC   This may have changed:  when to take this   Used for:  HTN (hypertension)        Dose:  10 mg   Take 1 tablet (10 mg) by mouth daily   Quantity:  90 tablet   Refills:  3                Primary Care Provider Office Phone # Fax #    Shay Kirkpatrick [phone]       [address] [place] [address] [address] [place] [phone]        Equal Access to Services     FRANKLIN PORTILLO AH: Hadii tavo ku hadasho Soomaali, waaxda luqadaha, qaybta kaalmada adeegyada, devi duckworth. So [facility] [phone].    ATENCIÓN: Si habla español, tiene a zheng disposición servicios gratuitos de asistencia lingüística. Llame al [phone].    We comply with applicable federal civil rights laws and [place] laws. We do not  discriminate on the basis of race, color, national origin, age, disability, sex, sexual orientation, or gender identity.            Thank you!     Thank you for choosing [facility] COLON AND RECTAL SURGERY  for your care. Our goal is always to provide you with excellent care. Hearing back from our patients is one way we can continue to improve our services. Please take a few minutes to complete the written survey that you may receive in the mail after your visit with us. Thank you!             Your Updated Medication List - Protect others around you: Learn how to safely use, store and throw away your medicines at www.disposemymeds.org.          This list is accurate as of: 11/28/17  1:03 PM.  Always use your most recent med list.                   Brand Name Dispense Instructions for use Diagnosis    amLODIPine 10 MG tablet    NORVASC    90 tablet    Take 1 tablet (10 mg) by mouth daily    HTN (hypertension)       CIALIS 5 MG tablet   Generic drug:  tadalafil      Take 5 mg by mouth as needed        cyclobenzaprine 10 MG tablet    FLEXERIL    90 tablet    Take 1 tablet (10 mg) by mouth 3 times daily as needed for muscle spasms    Immunosuppression (H)       fludrocortisone 0.1 MG tablet    FLORINEF    90 tablet    Take 3 tablets (0.3 mg) by mouth daily    Hyperkalemia       furosemide 20 MG tablet    LASIX    60 tablet    Take 1 tablet (20 mg) by mouth 2 times daily    Hyperkalemia       GABAPENTIN PO      Take 300 mg by mouth 3 times daily        insulin glargine 100 UNIT/ML injection    LANTUS     Inject 50 Units Subcutaneous every morning        linagliptin 5 MG Tabs tablet    TRADJENTA     Take 5 mg by mouth every evening        loperamide 2 MG capsule    IMODIUM    120 capsule    Take 2 capsules (4 mg) by mouth 2 times daily    S/P right hemicolectomy       magnesium oxide 400 (241.3 MG) MG tablet    MAG-OX    180 tablet    Take 1 tablet (400 mg) by mouth 2 times daily    Hypomagnesemia, CKD (chronic kidney  disease) stage 3, GFR 30-59 ml/min       mycophenolic acid 360 MG EC tablet    GENERIC EQUIVALENT    120 tablet    Take 2 tablets (720 mg) by mouth every 12 hours    History of liver transplant (H), Long-term use of immunosuppressant medication       OMEPRAZOLE PO      Take 20 mg by mouth every morning        * oxyCODONE IR 10 MG tablet    ROXICODONE    30 tablet    Take 1 tablet (10 mg) by mouth daily as needed for moderate to severe pain    Osteoarthritis       * oxyCODONE IR 5 MG tablet    ROXICODONE    15 tablet    Take 1 tablet (5 mg) by mouth every 3 hours as needed for pain or other (Moderate to Severe)    Gangrene of finger (H)       patiromer 8.4 G packet    VELTASSA    30 each    Take 8.4 g by mouth daily    Hyperkalemia       predniSONE 5 MG tablet    DELTASONE    30 tablet    Take 1 tablet (5 mg) by mouth daily    Liver transplant recipient (H), Long-term use of immunosuppressant medication       sodium bicarbonate 650 MG tablet     180 tablet    Take 2 tablets (1,300 mg) by mouth 3 times daily    Hyperkalemia       tacrolimus 1 MG capsule    GENERIC EQUIVALENT    60 capsule    Take 1 capsule (1 mg) by mouth every 12 hours    Liver transplant recipient (H)       * Notice:  This list has 2 medication(s) that are the same as other medications prescribed for you. Read the directions carefully, and ask your doctor or other care provider to review them with you.

## 2017-11-28 NOTE — PROGRESS NOTES
Patient presented as a walk in to clinic today with stoma prolapse. 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 Edematous but no necrosis or bleeding. After application of sugar, this was able to be manually reduced, which he tolerated well. Will have him wear a binder to try to prevent recurrent prolapse. He can apply sugar or honey at home and reduce if it starts to prolapse again but if this is not effective or if he develops any pain, necrosis, or bleeding, return to clinic.    LAMIN Queen, NP-C  Colon and Rectal Surgery  [facility]

## 2017-11-28 NOTE — PROGRESS NOTES
Reason for visit:    Camacho Bhgaat came in to the clinic for a  post op check.    His surgery was done 11/10/2017 by Dr Noonan.  He had  right index finger, status post V-Y flap.    Assessment:    Camacho came into the clinic with his finger dressing clean, dry and intact.    The Surgical wounds were exposed and found to be healing, with no evidence of infection.  Sutures removed without difficulty.      We redressed Camacho's finger and I encouraged him to keep it covered.  Dressing materials given to him.    Plan:  He has a follow up for 12/15/17 with Dr. Noonan.    He has our phone number and will call with questions or problems.

## 2017-12-01 ENCOUNTER — OFFICE VISIT (OUTPATIENT)
Dept: GASTROENTEROLOGY | Facility: CLINIC | Age: 52
End: 2017-12-01
Attending: INTERNAL MEDICINE
Payer: COMMERCIAL

## 2017-12-01 ENCOUNTER — TELEPHONE (OUTPATIENT)
Dept: INTERNAL MEDICINE | Facility: CLINIC | Age: 52
End: 2017-12-01

## 2017-12-01 VITALS
WEIGHT: 192.6 LBS | BODY MASS INDEX: 26.09 KG/M2 | HEART RATE: 75 BPM | TEMPERATURE: 98.1 F | SYSTOLIC BLOOD PRESSURE: 165 MMHG | DIASTOLIC BLOOD PRESSURE: 81 MMHG | HEIGHT: 72 IN | OXYGEN SATURATION: 100 %

## 2017-12-01 DIAGNOSIS — Z79.60 LONG-TERM USE OF IMMUNOSUPPRESSANT MEDICATION: ICD-10-CM

## 2017-12-01 DIAGNOSIS — N18.30 ANEMIA IN STAGE 3 CHRONIC KIDNEY DISEASE (H): ICD-10-CM

## 2017-12-01 DIAGNOSIS — R79.89 ELEVATED SERUM CREATININE: Primary | ICD-10-CM

## 2017-12-01 DIAGNOSIS — Z94.4 HISTORY OF LIVER TRANSPLANT (H): ICD-10-CM

## 2017-12-01 DIAGNOSIS — D63.1 ANEMIA IN STAGE 3 CHRONIC KIDNEY DISEASE (H): ICD-10-CM

## 2017-12-01 DIAGNOSIS — N18.30 CKD (CHRONIC KIDNEY DISEASE) STAGE 3, GFR 30-59 ML/MIN (H): ICD-10-CM

## 2017-12-01 DIAGNOSIS — Z94.4 LIVER REPLACED BY TRANSPLANT (H): Primary | ICD-10-CM

## 2017-12-01 DIAGNOSIS — N18.4 CHRONIC KIDNEY DISEASE, STAGE 4 (SEVERE) (H): ICD-10-CM

## 2017-12-01 LAB
ALBUMIN SERPL-MCNC: 3.2 G/DL (ref 3.4–5)
ALP SERPL-CCNC: 166 U/L (ref 40–150)
ALT SERPL W P-5'-P-CCNC: 26 U/L (ref 0–70)
ANION GAP SERPL CALCULATED.3IONS-SCNC: 5 MMOL/L (ref 3–14)
AST SERPL W P-5'-P-CCNC: 23 U/L (ref 0–45)
BILIRUB DIRECT SERPL-MCNC: 0.1 MG/DL (ref 0–0.2)
BILIRUB SERPL-MCNC: 0.4 MG/DL (ref 0.2–1.3)
BUN SERPL-MCNC: 28 MG/DL (ref 7–30)
CALCIUM SERPL-MCNC: 8.8 MG/DL (ref 8.5–10.1)
CHLORIDE SERPL-SCNC: 113 MMOL/L (ref 94–109)
CO2 SERPL-SCNC: 25 MMOL/L (ref 20–32)
CREAT SERPL-MCNC: 1.29 MG/DL (ref 0.66–1.25)
ERYTHROCYTE [DISTWIDTH] IN BLOOD BY AUTOMATED COUNT: 15.4 % (ref 10–15)
GFR SERPL CREATININE-BSD FRML MDRD: 58 ML/MIN/1.7M2
GLUCOSE SERPL-MCNC: 138 MG/DL (ref 70–99)
HCT VFR BLD AUTO: 27.3 % (ref 40–53)
HGB BLD-MCNC: 8.6 G/DL (ref 13.3–17.7)
MAGNESIUM SERPL-MCNC: 1.9 MG/DL (ref 1.6–2.3)
MCH RBC QN AUTO: 30.6 PG (ref 26.5–33)
MCHC RBC AUTO-ENTMCNC: 31.5 G/DL (ref 31.5–36.5)
MCV RBC AUTO: 97 FL (ref 78–100)
PHOSPHATE SERPL-MCNC: 3 MG/DL (ref 2.5–4.5)
PLATELET # BLD AUTO: 78 10E9/L (ref 150–450)
POTASSIUM SERPL-SCNC: 5.5 MMOL/L (ref 3.4–5.3)
PROT SERPL-MCNC: 7.1 G/DL (ref 6.8–8.8)
RBC # BLD AUTO: 2.81 10E12/L (ref 4.4–5.9)
SODIUM SERPL-SCNC: 143 MMOL/L (ref 133–144)
WBC # BLD AUTO: 4.7 10E9/L (ref 4–11)

## 2017-12-01 PROCEDURE — 83735 ASSAY OF MAGNESIUM: CPT

## 2017-12-01 PROCEDURE — 82248 BILIRUBIN DIRECT: CPT

## 2017-12-01 PROCEDURE — 85027 COMPLETE CBC AUTOMATED: CPT

## 2017-12-01 PROCEDURE — 84460 ALANINE AMINO (ALT) (SGPT): CPT

## 2017-12-01 PROCEDURE — 99212 OFFICE O/P EST SF 10 MIN: CPT | Mod: ZF

## 2017-12-01 PROCEDURE — 36415 COLL VENOUS BLD VENIPUNCTURE: CPT

## 2017-12-01 PROCEDURE — 82247 BILIRUBIN TOTAL: CPT

## 2017-12-01 PROCEDURE — 84155 ASSAY OF PROTEIN SERUM: CPT

## 2017-12-01 PROCEDURE — 84450 TRANSFERASE (AST) (SGOT): CPT

## 2017-12-01 PROCEDURE — 80069 RENAL FUNCTION PANEL: CPT

## 2017-12-01 PROCEDURE — 84075 ASSAY ALKALINE PHOSPHATASE: CPT

## 2017-12-01 NOTE — TELEPHONE ENCOUNTER
Prior Authorization Retail Medication Request  Medication/Dose: oxycodone  Diagnosis and ICD code: osteoarthritis  New/Renewal/Insurance Change PA: new  Previously Tried and Failed Therapies: see chart    Insurance ID (if provided):   Insurance Phone (if provided):     Any additional info from fax request:     If you received a fax notification from an outside Pharmacy:  Pharmacy Name:  Pharmacy #:  Pharmacy Fax:

## 2017-12-01 NOTE — LETTER
12/1/2017       RE: Camacho Bhagat  [address] [facility] [address]     Dear Colleague,    Thank you for referring your patient, Camacho Bhagat, to the [facility] HEPATOLOGY at [facility]. Please see a copy of my visit note below.    Hepatology clinic note    Visit Date: December 2, 2017    Camacho Bhagat  MRN: [id number]  YOB: 1965    SUBJECTIVE:   Camacho Bhagat is a 52 year old male has a past medical history of Depressive disorder; Diabetes type 2; Esophageal reflux; Fibromyalgia; Gangrene of finger; deep venous thrombosis ; CASTAÑEDA (nonalcoholic steatohepatitis); HTN (hypertension); History of hepatocellular carcinoma; History of liver transplant; obstructive sleep apnea; HLD (hyperlipidemia); Neutropenic colitis; Osteoarthritis; Presence of PERMANENT IVC filter and Rheumatoid arthritis.     The patient reports doing well after his recent hospitalization from 10/24 - 10/27/17 with Hyperkalemia.  During that admission he was started on potassium binders (Veltassa), and continued on his florinef, and was discharged with lasix.     The patient's ischemia and necrosis of his digits appears to be stable and his anima is slightly improved.  Kidney function also is significantly improved and potassium remains borderline high normal. He denies any abdominal pain, itching, skin rash or fatigue.  He denies any increased abdominal girth or lower extremity edema.  He denies any fevers or chills, cough or shortness of breath.  He denies any nausea, vomiting, diarrhea or constipation.  His appetite has been good, but continues to struggle with a low potassium diet.  No fevers, chills or cough.    Review of systems:  10 point ROS negative except as noted above.    Past medical history reviewed.    Past Medical History:   Diagnosis Date     Depressive disorder, not elsewhere classified      Diabetes type 2, controlled (H) 11/10/2016     Esophageal reflux      Fibromyalgia  1/2009    dx with Dr Benitez( Rheum)     Gangrene of finger (H) 8/25/2017     H/O deep venous thrombosis 11/2001    Permanent IVC filter in place.     H/O CASTAÑEDA (nonalcoholic steatohepatitis)      H/O Pneumonia, organism unspecified(486) 10/2001    Included ARDS, sepsis, and  acute renal failure; hospitalized, required tracheostomy placement.     H/O: HTN (hypertension) 11/2001    No longer prescribed antihypertensive medication.       History of hepatocellular carcinoma      History of liver transplant (H)      History of obstructive sleep apnea     No longer wears CPAP since losing approximately 200 pounds with his liver transplant and its complications.       HLD (hyperlipidemia)      Ischemia of both lower extremities 8/25/2017    Distal ischemia due to shock/high pressor requirements     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     Osteoarthritis      Presence of PERMANENT IVC filter      Rheumatoid arthritis(714.0)        Allergies   Allergen Reactions     Erythromycin GI Disturbance     Vioxx      Nausea, vomiting       Past Surgical History:   Procedure Laterality Date     BENCH LIVER N/A 3/4/2017    Procedure: BENCH LIVER;  Surgeon: Jovan Tran MD;  Location: [facility] TOTAL KNEE ARTHROPLASTY  2008    Right knee arthroscopy     CHOLECYSTECTOMY       COLONOSCOPY N/A 7/21/2017    Procedure: COMBINED COLONOSCOPY, SINGLE OR MULTIPLE BIOPSY/POLYPECTOMY BY BIOPSY;  Colonoscopy;  Surgeon: Izaiah Montes MD;  Location:  GI     ENT SURGERY      Repair of deviated septum     ESOPHAGOSCOPY, GASTROSCOPY, DUODENOSCOPY (EGD), COMBINED N/A 8/4/2016    Procedure: COMBINED ESOPHAGOSCOPY, GASTROSCOPY, DUODENOSCOPY (EGD), BIOPSY SINGLE OR MULTIPLE;  Surgeon: Trent Pederson MD;  Location:  GI     ESOPHAGOSCOPY, GASTROSCOPY, DUODENOSCOPY (EGD), COMBINED N/A 8/27/2017    Procedure: COMBINED ESOPHAGOSCOPY, GASTROSCOPY, DUODENOSCOPY (EGD);;  Surgeon: Los Wynn MD;  Location:  GI     LAPAROTOMY  EXPLORATORY N/A 2017    Procedure: LAPAROTOMY EXPLORATORY;  Exploratory Laparotomy, washout;  Surgeon: Tip Zhang MD;  Location: [facility] OR     LAPAROTOMY EXPLORATORY N/A 2017    Procedure: LAPAROTOMY EXPLORATORY;  Exploratory Laparotomy, Washout with closure.;  Surgeon: Tip Zhang MD;  [facility] OR     LAPAROTOMY EXPLORATORY N/A 2017    Procedure: LAPAROTOMY EXPLORATORY;  Exploratory Laparotomy, Right angelique-colectomy, end ileostomy, mucosal fistula, partial omentectomy;  Surgeon: Saar Dinh MD;  Location: [facility] OR     ORTHOPEDIC SURGERY Right     Repair of dislocated wrist.       RELEASE TRIGGER FINGER Right 11/10/2017    Procedure: RELEASE TRIGGER FINGER;  Debride, V-Y Flap Right Index Finger;  Surgeon: Momo Noonan MD;  Location:  OR     TRACHEOSTOMY  2001    During hospitalization for pneumonia.       TRANSPLANT LIVER RECIPIENT  DONOR N/A 3/4/2017    Procedure: TRANSPLANT LIVER RECIPIENT  DONOR;  Surgeon: Jovan Tran MD;  Location: [facility] OR       Family History   Problem Relation Age of Onset     DIABETES Father      Hypertension Father      Substance Abuse Father      Arthritis Mother      Thyroid Cancer Mother      Survivor!     Cervical Cancer Maternal Grandmother      CEREBROVASCULAR DISEASE Maternal Grandfather      Prostate Cancer Paternal Grandfather      Colon Cancer No family hx of      Hyperlipidemia No family hx of      Coronary Artery Disease No family hx of      Breast Cancer No family hx of        Social History     Social History     Marital status:      Spouse name: N/A     Number of children: 1     Years of education: N/A     Occupational History            Social History Main Topics     Smoking status: Former Smoker     Packs/day: 0.75     Years: 2.00     Quit date:      Smokeless tobacco: Former User     Types: Chew     Quit date: 10/8/2015     Alcohol use No      Comment: No alcohol since  liver transplant.       Drug use: No     Sexual activity: Not Currently     Partners: Female     Other Topics Concern     Not on file     Social History Narrative    uSED TO BE      Back in school now           Current Outpatient Prescriptions   Medication     predniSONE (DELTASONE) 5 MG tablet     mycophenolic acid (GENERIC EQUIVALENT) 360 MG EC tablet     patiromer (VELTASSA) 8.4 G packet     magnesium oxide (MAG-OX) 400 (241.3 MG) MG tablet     tacrolimus (GENERIC EQUIVALENT) 1 MG capsule     fludrocortisone (FLORINEF) 0.1 MG tablet     amLODIPine (NORVASC) 10 MG tablet     oxyCODONE IR (ROXICODONE) 10 MG tablet     CIALIS 5 MG tablet     furosemide (LASIX) 20 MG tablet     sodium bicarbonate 650 MG tablet     OMEPRAZOLE PO     GABAPENTIN PO     insulin glargine (LANTUS) 100 UNIT/ML injection     linagliptin (TRADJENTA) 5 MG TABS tablet     loperamide (IMODIUM) 2 MG capsule     cyclobenzaprine (FLEXERIL) 10 MG tablet     No current facility-administered medications for this visit.      Vitals:  /81  Pulse 75  Temp 98.1  F (36.7  C) (Oral)  Ht 1.829 m (6')  Wt 87.4 kg (192 lb 9.6 oz)  SpO2 100%  BMI 26.12 kg/m2    Physical Exam:  Gen: Well-nourished and in no apparent distress  HEENT: normocephalic, atraumatic, PERRL, no scleral icterus, cranial nerves II-XII grossly intact  Neck: supple, no LAD, no thyromegaly  CV: regular rate and rhythm  Resp: clear to ausculation bilaterally, normal respiratory effort  Abd: bowel sounds presents, soft. non-tender, non-distended, no masses or hepatosplenomegaly  Ext: WWP, no LE edema, several areas of discoloration and apartment ischemia on a few digits  Psych: normal mood, normal affect, alert and oriented x3    Assessment/Plan:  Mr. Bhagat is improving overall with respect to his kidney and liver function, 9 months post-transplant. No acute changes to his immunosuppression regimen at this time.  His  underlying chronic kidney disease and  anemia have improved and his is being followed by Nephrology for ongoing management.  With respect to his ongoing issues with Hyperkaliemia I would consider altering the patient diuretics in an effort to remove Veltassa (patient currently on Lasix, veltassa, and florinef). Otherwise, will not be making any change to his medical regimen.       Return to clinic in 3 months.    Patient seen and discussed with Dr. Camejo who agrees with this plan.    Jesse Sanders MD, [facility]  Internal Medicine PGY-2  [facility]  Pager: [phone]    The patient was seen and examined.  The above assessment and plan was developed jointly with the resident.      Toñito Camejo MD      Professor of Medicine  [facility] [employer]      Executive Medical Director, Solid Organ Transplant Program  [facility]

## 2017-12-01 NOTE — TELEPHONE ENCOUNTER
Central Prior Authorization Team   Phone: [phone]    PA Initiation    Medication: oxycodone -pa initiated   Insurance Company:  [facility]  [phone] phone  Pharmacy Filling the Rx: [facility] [address] [place], [place] [address]S, [address]  Filling Pharmacy Phone: [phone]  Filling Pharmacy Fax:    Start Date: 12/1/2017

## 2017-12-01 NOTE — PROGRESS NOTES
Hepatology clinic note    Visit Date: December 2, 2017    Camacho Bhagat  MRN: [id number]  YOB: 1965    SUBJECTIVE:   Camacho Bhagat is a 52 year old male has a past medical history of Depressive disorder; Diabetes type 2; Esophageal reflux; Fibromyalgia; Gangrene of finger; deep venous thrombosis ; CASTAÑEDA (nonalcoholic steatohepatitis); HTN (hypertension); History of hepatocellular carcinoma; History of liver transplant; obstructive sleep apnea; HLD (hyperlipidemia); Neutropenic colitis; Osteoarthritis; Presence of PERMANENT IVC filter and Rheumatoid arthritis.     The patient reports doing well after his recent hospitalization from 10/24 - 10/27/17 with Hyperkalemia.  During that admission he was started on potassium binders (Veltassa), and continued on his florinef, and was discharged with lasix.     The patient's ischemia and necrosis of his digits appears to be stable and his anima is slightly improved.  Kidney function also is significantly improved and potassium remains borderline high normal. He denies any abdominal pain, itching, skin rash or fatigue.  He denies any increased abdominal girth or lower extremity edema.  He denies any fevers or chills, cough or shortness of breath.  He denies any nausea, vomiting, diarrhea or constipation.  His appetite has been good, but continues to struggle with a low potassium diet.  No fevers, chills or cough.    Review of systems:  10 point ROS negative except as noted above.    Past medical history reviewed.    Past Medical History:   Diagnosis Date     Depressive disorder, not elsewhere classified      Diabetes type 2, controlled (H) 11/10/2016     Esophageal reflux      Fibromyalgia 1/2009    dx with Dr Benitez( Rheum)     Gangrene of finger (H) 8/25/2017     H/O deep venous thrombosis 11/2001    Permanent IVC filter in place.     H/O CASTAÑEDA (nonalcoholic steatohepatitis)      H/O Pneumonia, organism unspecified(486) 10/2001    Included ARDS, sepsis, and   acute renal failure; hospitalized, required tracheostomy placement.     H/O: HTN (hypertension) 11/2001    No longer prescribed antihypertensive medication.       History of hepatocellular carcinoma      History of liver transplant (H)      History of obstructive sleep apnea     No longer wears CPAP since losing approximately 200 pounds with his liver transplant and its complications.       HLD (hyperlipidemia)      Ischemia of both lower extremities 8/25/2017    Distal ischemia due to shock/high pressor requirements     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     Osteoarthritis      Presence of PERMANENT IVC filter      Rheumatoid arthritis(714.0)        Allergies   Allergen Reactions     Erythromycin GI Disturbance     Vioxx      Nausea, vomiting       Past Surgical History:   Procedure Laterality Date     BENCH LIVER N/A 3/4/2017    Procedure: BENCH LIVER;  Surgeon: Jovan Tran MD;  Location: [facility] TOTAL KNEE ARTHROPLASTY  2008    Right knee arthroscopy     CHOLECYSTECTOMY       COLONOSCOPY N/A 7/21/2017    Procedure: COMBINED COLONOSCOPY, SINGLE OR MULTIPLE BIOPSY/POLYPECTOMY BY BIOPSY;  Colonoscopy;  Surgeon: Izaiah Motnes MD;  Location:  GI     ENT SURGERY      Repair of deviated septum     ESOPHAGOSCOPY, GASTROSCOPY, DUODENOSCOPY (EGD), COMBINED N/A 8/4/2016    Procedure: COMBINED ESOPHAGOSCOPY, GASTROSCOPY, DUODENOSCOPY (EGD), BIOPSY SINGLE OR MULTIPLE;  Surgeon: Trent Pederson MD;  Location:  GI     ESOPHAGOSCOPY, GASTROSCOPY, DUODENOSCOPY (EGD), COMBINED N/A 8/27/2017    Procedure: COMBINED ESOPHAGOSCOPY, GASTROSCOPY, DUODENOSCOPY (EGD);;  Surgeon: Los Wynn MD;  Location:  GI     LAPAROTOMY EXPLORATORY N/A 7/4/2017    Procedure: LAPAROTOMY EXPLORATORY;  Exploratory Laparotomy, washout;  Surgeon: Tip Zhang MD;  [facility]     LAPAROTOMY EXPLORATORY N/A 7/5/2017    Procedure: LAPAROTOMY EXPLORATORY;  Exploratory Laparotomy, Washout with closure.;   Surgeon: Tip Zhang MD;  [facility] OR     LAPAROTOMY EXPLORATORY N/A 2017    Procedure: LAPAROTOMY EXPLORATORY;  Exploratory Laparotomy, Right angeilque-colectomy, end ileostomy, mucosal fistula, partial omentectomy;  Surgeon: Sara Dinh MD;  Location: [facility] OR     ORTHOPEDIC SURGERY Right     Repair of dislocated wrist.       RELEASE TRIGGER FINGER Right 11/10/2017    Procedure: RELEASE TRIGGER FINGER;  Debride, V-Y Flap Right Index Finger;  Surgeon: Momo Noonan MD;  Location:  OR     TRACHEOSTOMY  2001    During hospitalization for pneumonia.       TRANSPLANT LIVER RECIPIENT  DONOR N/A 3/4/2017    Procedure: TRANSPLANT LIVER RECIPIENT  DONOR;  Surgeon: Jovan Tran MD;  Location: [facility] OR       Family History   Problem Relation Age of Onset     DIABETES Father      Hypertension Father      Substance Abuse Father      Arthritis Mother      Thyroid Cancer Mother      Survivor!     Cervical Cancer Maternal Grandmother      CEREBROVASCULAR DISEASE Maternal Grandfather      Prostate Cancer Paternal Grandfather      Colon Cancer No family hx of      Hyperlipidemia No family hx of      Coronary Artery Disease No family hx of      Breast Cancer No family hx of        Social History     Social History     Marital status:      Spouse name: N/A     Number of children: 1     Years of education: N/A     Occupational History            Social History Main Topics     Smoking status: Former Smoker     Packs/day: 0.75     Years: 2.00     Quit date:      Smokeless tobacco: Former User     Types: Chew     Quit date: 10/8/2015     Alcohol use No      Comment: No alcohol since liver transplant.       Drug use: No     Sexual activity: Not Currently     Partners: Female     Other Topics Concern     Not on file     Social History Narrative    uSED TO BE      Back in school now           Current Outpatient Prescriptions    Medication     predniSONE (DELTASONE) 5 MG tablet     mycophenolic acid (GENERIC EQUIVALENT) 360 MG EC tablet     patiromer (VELTASSA) 8.4 G packet     magnesium oxide (MAG-OX) 400 (241.3 MG) MG tablet     tacrolimus (GENERIC EQUIVALENT) 1 MG capsule     fludrocortisone (FLORINEF) 0.1 MG tablet     amLODIPine (NORVASC) 10 MG tablet     oxyCODONE IR (ROXICODONE) 10 MG tablet     CIALIS 5 MG tablet     furosemide (LASIX) 20 MG tablet     sodium bicarbonate 650 MG tablet     OMEPRAZOLE PO     GABAPENTIN PO     insulin glargine (LANTUS) 100 UNIT/ML injection     linagliptin (TRADJENTA) 5 MG TABS tablet     loperamide (IMODIUM) 2 MG capsule     cyclobenzaprine (FLEXERIL) 10 MG tablet     No current facility-administered medications for this visit.      Vitals:  /81  Pulse 75  Temp 98.1  F (36.7  C) (Oral)  Ht 1.829 m (6')  Wt 87.4 kg (192 lb 9.6 oz)  SpO2 100%  BMI 26.12 kg/m2    Physical Exam:  Gen: Well-nourished and in no apparent distress  HEENT: normocephalic, atraumatic, PERRL, no scleral icterus, cranial nerves II-XII grossly intact  Neck: supple, no LAD, no thyromegaly  CV: regular rate and rhythm  Resp: clear to ausculation bilaterally, normal respiratory effort  Abd: bowel sounds presents, soft. non-tender, non-distended, no masses or hepatosplenomegaly  Ext: WWP, no LE edema, several areas of discoloration and apartment ischemia on a few digits  Psych: normal mood, normal affect, alert and oriented x3    Assessment/Plan:  Mr. Bhagat is improving overall with respect to his kidney and liver function, 9 months post-transplant. No acute changes to his immunosuppression regimen at this time.  His underlying chronic kidney disease and anemia have improved and his is being followed by Nephrology for ongoing management.  With respect to his ongoing issues with Hyperkaliemia I would consider altering the patient diuretics in an effort to remove Veltassa (patient currently on Lasix, veltassa, and  florinef). Otherwise, will not be making any change to his medical regimen.       Return to clinic in 3 months.    Patient seen and discussed with Dr. Camejo who agrees with this plan.    Jesse Sanders MD, A  Internal Medicine PGY-2  [facility]  Pager: [phone]    The patient was seen and examined.  The above assessment and plan was developed jointly with the resident.      Toñito Camejo MD      Professor of Medicine  [facility] [employer]      Executive Medical Director, Solid Organ Transplant Program  [facility]

## 2017-12-01 NOTE — MR AVS SNAPSHOT
After Visit Summary   12/1/2017    Camacho Bhagat    MRN: [id number]           Patient Information     Date Of Birth          1965        Visit Information        Provider Department      12/1/2017 8:45 AM Toñito Camejo MD [facility] Hepatology         Follow-ups after your visit        Follow-up notes from your care team     Return in about 3 months (around 3/1/2018).      Your next 10 appointments already scheduled     Dec 06, 2017 11:30 AM CST   Lab with MARIA LUISA LAB   [facility] Lab ([facility])    [address] [place] [address]   [phone]            Dec 06, 2017  1:00 PM CST   (Arrive by 12:30 PM)   Return Visit with Cinda Cazares NP   [facility] Nephrology ([facility])    [address] [place] [address]   [phone]            Dec 11, 2017  2:45 PM CST   (Arrive by 2:30 PM)   Return Vascular Visit with Estefani Beal MD   [facility] Vascular Clinic ([facility])    [address] [place] [address]   [phone]            Dec 13, 2017 10:30 AM CST   (Arrive by 10:15 AM)   Return Visit with Sara Dinh MD   [facility] Colon and Rectal Surgery ([facility])    [address] [place] [address]   [phone]            Dec 13, 2017 12:30 PM CST   (Arrive by 12:15 PM)   PAC EVALUATION with [facility] Pac [facility] 5   [facility] Preoperative Assessment [facility] ([facility])    [address] [place] [address]   [phone]            Dec 13, 2017  1:30 PM CST   (Arrive by 1:15 PM)   PAC RN ASSESSMENT with Uc Pac Rn   [facility] Preoperative Assessment [facility] ([facility])    [address] [place] [address]   [phone]            Dec 13, 2017  2:00 PM CST   (Arrive by 1:45 PM)   PAC Anesthesia  Consult with  Pac Anesthesiologist   [facility] Preoperative Assessment Center ([facility] [place])    [address]  4th Floor  [facility] [address] [phone]            Dec 15, 2017 10:30 AM CST   (Arrive by 10:15 AM)   RETURN HAND with Momo Noonan MD   [facility] Orthopaedic Clinic ([facility] [place])    [address]  4th Floor  [facility] [id number] [phone]            Jan 05, 2018   Procedure with Sara Dinh MD   [facility] [place], Same Day Surgery (--)    [address] [facility] [address]   [phone]            Mar 02, 2018 10:45 AM CST   (Arrive by 10:30 AM)   Return Liver Transplant with Toñito Camejo MD   [facility] Hepatology ([facility])    [address]  3rd [facility] [id number] [phone]              Who to contact     If you have questions or need follow up information about today's clinic visit or your schedule please contact [facility] HEPATOLOGY directly at [phone].  Normal or non-critical lab and imaging results will be communicated to you by Gobblehart, letter or phone within 4 business days after the clinic has received the results. If you do not hear from us within 7 days, please contact the clinic through Gobblehart or phone. If you have a critical or abnormal lab result, we will notify you by phone as soon as possible.  Submit refill requests through Moobia or call your pharmacy and they will forward the refill request to us. Please allow 3 business days for your refill to be completed.          Additional Information About Your Visit        Gobblehart Information     Moobia gives you secure access to your electronic health record. If you see a primary care provider, you can also send messages to your care team and make appointments. If you have questions, please call your primary care clinic.  If you do not have a primary care provider, please call  [phone] and they will assist you.        Care EveryWhere ID     This is your Care EveryWhere ID. This could be used by other organizations to access your [facility] medical records  [id number]        Your Vitals Were     Pulse Temperature Height Pulse Oximetry BMI (Body Mass Index)       75 98.1  F (36.7  C) (Oral) 1.829 m (6') 100% 26.12 kg/m2        Blood Pressure from Last 3 Encounters:   12/01/17 165/81   11/20/17 148/85   11/10/17 146/87    Weight from Last 3 Encounters:   12/01/17 87.4 kg (192 lb 9.6 oz)   11/17/17 82.1 kg (181 lb)   11/10/17 82.2 kg (181 lb 3.2 oz)              Today, you had the following     No orders found for display         Today's Medication Changes          These changes are accurate as of: 12/1/17  9:13 AM.  If you have any questions, ask your nurse or doctor.               These medicines have changed or have updated prescriptions.        Dose/Directions    amLODIPine 10 MG tablet   Commonly known as:  NORVASC   This may have changed:  when to take this   Used for:  HTN (hypertension)        Dose:  10 mg   Take 1 tablet (10 mg) by mouth daily   Quantity:  90 tablet   Refills:  3       loperamide 2 MG capsule   Commonly known as:  IMODIUM   This may have changed:  how much to take   Used for:  S/P right hemicolectomy        Dose:  4 mg   Take 2 capsules (4 mg) by mouth 2 times daily   Quantity:  120 capsule   Refills:  3       magnesium oxide 400 (241.3 MG) MG tablet   Commonly known as:  MAG-OX   This may have changed:  when to take this   Used for:  Hypomagnesemia, CKD (chronic kidney disease) stage 3, GFR 30-59 ml/min        Dose:  400 mg   Take 1 tablet (400 mg) by mouth 2 times daily   Quantity:  180 tablet   Refills:  3                Primary Care Provider Office Phone # Fax #    Shay Kirkpatrick [phone]       [address] [place] [address] [address]  [facility] [address]        Equal Access to Services     FRANKLIN PORTILLO AH: Hadii aad troy Conte,  danielle caro sapphiredevi chapa. So [facility] [phone].    ATENCIÓN: Si brandt garcia, tiene a zheng disposición servicios gratuitos de asistencia lingüística. Shahla al [phone].    We comply with applicable federal civil rights laws and [place] laws. We do not discriminate on the basis of race, color, national origin, age, disability, sex, sexual orientation, or gender identity.            Thank you!     Thank you for choosing [facility] HEPATOLOGY  for your care. Our goal is always to provide you with excellent care. Hearing back from our patients is one way we can continue to improve our services. Please take a few minutes to complete the written survey that you may receive in the mail after your visit with us. Thank you!             Your Updated Medication List - Protect others around you: Learn how to safely use, store and throw away your medicines at www.disposemymeds.org.          This list is accurate as of: 12/1/17  9:13 AM.  Always use your most recent med list.                   Brand Name Dispense Instructions for use Diagnosis    amLODIPine 10 MG tablet    NORVASC    90 tablet    Take 1 tablet (10 mg) by mouth daily    HTN (hypertension)       CIALIS 5 MG tablet   Generic drug:  tadalafil      Take 5 mg by mouth as needed        cyclobenzaprine 10 MG tablet    FLEXERIL    90 tablet    Take 1 tablet (10 mg) by mouth 3 times daily as needed for muscle spasms    Immunosuppression (H)       fludrocortisone 0.1 MG tablet    FLORINEF    90 tablet    Take 3 tablets (0.3 mg) by mouth daily    Hyperkalemia       furosemide 20 MG tablet    LASIX    60 tablet    Take 1 tablet (20 mg) by mouth 2 times daily    Hyperkalemia       GABAPENTIN PO      Take 300 mg by mouth 3 times daily        insulin glargine 100 UNIT/ML injection    LANTUS     Inject 50 Units Subcutaneous every morning        linagliptin 5 MG Tabs tablet    TRADJENTA     Take 5 mg by mouth every evening         loperamide 2 MG capsule    IMODIUM    120 capsule    Take 2 capsules (4 mg) by mouth 2 times daily    S/P right hemicolectomy       magnesium oxide 400 (241.3 MG) MG tablet    MAG-OX    180 tablet    Take 1 tablet (400 mg) by mouth 2 times daily    Hypomagnesemia, CKD (chronic kidney disease) stage 3, GFR 30-59 ml/min       mycophenolic acid 360 MG EC tablet    GENERIC EQUIVALENT    120 tablet    Take 2 tablets (720 mg) by mouth every 12 hours    History of liver transplant (H), Long-term use of immunosuppressant medication       OMEPRAZOLE PO      Take 20 mg by mouth every morning        oxyCODONE IR 10 MG tablet    ROXICODONE    30 tablet    Take 1 tablet (10 mg) by mouth daily as needed for moderate to severe pain    Osteoarthritis       patiromer 8.4 G packet    VELTASSA    30 each    Take 8.4 g by mouth daily    Hyperkalemia       predniSONE 5 MG tablet    DELTASONE    30 tablet    Take 1 tablet (5 mg) by mouth daily    Liver transplant recipient (H), Long-term use of immunosuppressant medication       sodium bicarbonate 650 MG tablet     180 tablet    Take 2 tablets (1,300 mg) by mouth 3 times daily    Hyperkalemia       tacrolimus 1 MG capsule    GENERIC EQUIVALENT    60 capsule    Take 1 capsule (1 mg) by mouth every 12 hours    Liver transplant recipient (H)

## 2017-12-01 NOTE — LETTER
12/1/2017      RE: Camacho Bhagat  [address] [facility] [address]       Hepatology clinic note    Visit Date: December 2, 2017    Camacho Bhagat  MRN: [id number]  YOB: 1965    SUBJECTIVE:   Camacho Bhagat is a 52 year old male has a past medical history of Depressive disorder; Diabetes type 2; Esophageal reflux; Fibromyalgia; Gangrene of finger; deep venous thrombosis ; CASTAÑEDA (nonalcoholic steatohepatitis); HTN (hypertension); History of hepatocellular carcinoma; History of liver transplant; obstructive sleep apnea; HLD (hyperlipidemia); Neutropenic colitis; Osteoarthritis; Presence of PERMANENT IVC filter and Rheumatoid arthritis.     The patient reports doing well after his recent hospitalization from 10/24 - 10/27/17 with Hyperkalemia.  During that admission he was started on potassium binders (Veltassa), and continued on his florinef, and was discharged with lasix.     The patient's ischemia and necrosis of his digits appears to be stable and his anima is slightly improved.  Kidney function also is significantly improved and potassium remains borderline high normal. He denies any abdominal pain, itching, skin rash or fatigue.  He denies any increased abdominal girth or lower extremity edema.  He denies any fevers or chills, cough or shortness of breath.  He denies any nausea, vomiting, diarrhea or constipation.  His appetite has been good, but continues to struggle with a low potassium diet.  No fevers, chills or cough.    Review of systems:  10 point ROS negative except as noted above.    Past medical history reviewed.    Past Medical History:   Diagnosis Date     Depressive disorder, not elsewhere classified      Diabetes type 2, controlled (H) 11/10/2016     Esophageal reflux      Fibromyalgia 1/2009    dx with Dr Benitez( Rheum)     Gangrene of finger (H) 8/25/2017     H/O deep venous thrombosis 11/2001    Permanent IVC filter in place.     H/O CASTAÑEDA (nonalcoholic steatohepatitis)       H/O Pneumonia, organism unspecified(486) 10/2001    Included ARDS, sepsis, and  acute renal failure; hospitalized, required tracheostomy placement.     H/O: HTN (hypertension) 11/2001    No longer prescribed antihypertensive medication.       History of hepatocellular carcinoma      History of liver transplant (H)      History of obstructive sleep apnea     No longer wears CPAP since losing approximately 200 pounds with his liver transplant and its complications.       HLD (hyperlipidemia)      Ischemia of both lower extremities 8/25/2017    Distal ischemia due to shock/high pressor requirements     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     Osteoarthritis      Presence of PERMANENT IVC filter      Rheumatoid arthritis(714.0)        Allergies   Allergen Reactions     Erythromycin GI Disturbance     Vioxx      Nausea, vomiting       Past Surgical History:   Procedure Laterality Date     BENCH LIVER N/A 3/4/2017    Procedure: BENCH LIVER;  Surgeon: Jovan Tran MD;  Location: [facility] [place] TOTAL KNEE ARTHROPLASTY  2008    Right knee arthroscopy     CHOLECYSTECTOMY       COLONOSCOPY N/A 7/21/2017    Procedure: COMBINED COLONOSCOPY, SINGLE OR MULTIPLE BIOPSY/POLYPECTOMY BY BIOPSY;  Colonoscopy;  Surgeon: Izaiah oMntes MD;  Location:  GI     ENT SURGERY      Repair of deviated septum     ESOPHAGOSCOPY, GASTROSCOPY, DUODENOSCOPY (EGD), COMBINED N/A 8/4/2016    Procedure: COMBINED ESOPHAGOSCOPY, GASTROSCOPY, DUODENOSCOPY (EGD), BIOPSY SINGLE OR MULTIPLE;  Surgeon: Trent Pederson MD;  Location:  GI     ESOPHAGOSCOPY, GASTROSCOPY, DUODENOSCOPY (EGD), COMBINED N/A 8/27/2017    Procedure: COMBINED ESOPHAGOSCOPY, GASTROSCOPY, DUODENOSCOPY (EGD);;  Surgeon: Los Wynn MD;  Location:  GI     LAPAROTOMY EXPLORATORY N/A 7/4/2017    Procedure: LAPAROTOMY EXPLORATORY;  Exploratory Laparotomy, washout;  Surgeon: iTp Zhang MD;  Location:  OR     LAPAROTOMY EXPLORATORY N/A 7/5/2017     Procedure: LAPAROTOMY EXPLORATORY;  Exploratory Laparotomy, Washout with closure.;  Surgeon: Tip Zhang MD;  Location: [facility] OR     LAPAROTOMY EXPLORATORY N/A 2017    Procedure: LAPAROTOMY EXPLORATORY;  Exploratory Laparotomy, Right angelique-colectomy, end ileostomy, mucosal fistula, partial omentectomy;  Surgeon: Sara Dinh MD;  Location: [facility] OR     ORTHOPEDIC SURGERY Right     Repair of dislocated wrist.       RELEASE TRIGGER FINGER Right 11/10/2017    Procedure: RELEASE TRIGGER FINGER;  Debride, V-Y Flap Right Index Finger;  Surgeon: Momo Noonan MD;  Location: [facility] OR     TRACHEOSTOMY  2001    During hospitalization for pneumonia.       TRANSPLANT LIVER RECIPIENT  DONOR N/A 3/4/2017    Procedure: TRANSPLANT LIVER RECIPIENT  DONOR;  Surgeon: Jovan Tran MD;  Location: [facility] OR       Family History   Problem Relation Age of Onset     DIABETES Father      Hypertension Father      Substance Abuse Father      Arthritis Mother      Thyroid Cancer Mother      Survivor!     Cervical Cancer Maternal Grandmother      CEREBROVASCULAR DISEASE Maternal Grandfather      Prostate Cancer Paternal Grandfather      Colon Cancer No family hx of      Hyperlipidemia No family hx of      Coronary Artery Disease No family hx of      Breast Cancer No family hx of        Social History     Social History     Marital status:      Spouse name: N/A     Number of children: 1     Years of education: N/A     Occupational History            Social History Main Topics     Smoking status: Former Smoker     Packs/day: 0.75     Years: 2.00     Quit date:      Smokeless tobacco: Former User     Types: Chew     Quit date: 10/8/2015     Alcohol use No      Comment: No alcohol since liver transplant.       Drug use: No     Sexual activity: Not Currently     Partners: Female     Other Topics Concern     Not on file     Social History Narrative    uSED TO BE aircraft       Back in school now           Current Outpatient Prescriptions   Medication     predniSONE (DELTASONE) 5 MG tablet     mycophenolic acid (GENERIC EQUIVALENT) 360 MG EC tablet     patiromer (VELTASSA) 8.4 G packet     magnesium oxide (MAG-OX) 400 (241.3 MG) MG tablet     tacrolimus (GENERIC EQUIVALENT) 1 MG capsule     fludrocortisone (FLORINEF) 0.1 MG tablet     amLODIPine (NORVASC) 10 MG tablet     oxyCODONE IR (ROXICODONE) 10 MG tablet     CIALIS 5 MG tablet     furosemide (LASIX) 20 MG tablet     sodium bicarbonate 650 MG tablet     OMEPRAZOLE PO     GABAPENTIN PO     insulin glargine (LANTUS) 100 UNIT/ML injection     linagliptin (TRADJENTA) 5 MG TABS tablet     loperamide (IMODIUM) 2 MG capsule     cyclobenzaprine (FLEXERIL) 10 MG tablet     No current facility-administered medications for this visit.      Vitals:  /81  Pulse 75  Temp 98.1  F (36.7  C) (Oral)  Ht 1.829 m (6')  Wt 87.4 kg (192 lb 9.6 oz)  SpO2 100%  BMI 26.12 kg/m2    Physical Exam:  Gen: Well-nourished and in no apparent distress  HEENT: normocephalic, atraumatic, PERRL, no scleral icterus, cranial nerves II-XII grossly intact  Neck: supple, no LAD, no thyromegaly  CV: regular rate and rhythm  Resp: clear to ausculation bilaterally, normal respiratory effort  Abd: bowel sounds presents, soft. non-tender, non-distended, no masses or hepatosplenomegaly  Ext: WWP, no LE edema, several areas of discoloration and apartment ischemia on a few digits  Psych: normal mood, normal affect, alert and oriented x3    Assessment/Plan:  Mr. Bhagat is improving overall with respect to his kidney and liver function, 9 months post-transplant. No acute changes to his immunosuppression regimen at this time.  His  underlying chronic kidney disease and anemia have improved and his is being followed by Nephrology for ongoing management.  With respect to his ongoing issues with Hyperkaliemia I would consider altering the patient diuretics  in an effort to remove Veltassa (patient currently on Lasix, veltassa, and florinef). Otherwise, will not be making any change to his medical regimen.       Return to clinic in 3 months.    Patient seen and discussed with Dr. Camejo who agrees with this plan.    Jesse Sanders MD, [facility]  Internal Medicine PGY-2  [facility]  Pager: [phone]    The patient was seen and examined.  The above assessment and plan was developed jointly with the resident.      Toñito Camejo MD      Professor of Medicine  [facility] [employer]      Executive Medical Director, Solid Organ Transplant Program  [facility]

## 2017-12-05 NOTE — TELEPHONE ENCOUNTER
Called insurance to check on status they have not recvd anything through cover my meds asked me to fax over forms will fax over and check to see if they have recved this and it is being worked on

## 2017-12-06 ENCOUNTER — OFFICE VISIT (OUTPATIENT)
Dept: NEPHROLOGY | Facility: CLINIC | Age: 52
End: 2017-12-06
Payer: COMMERCIAL

## 2017-12-06 VITALS
OXYGEN SATURATION: 99 % | HEIGHT: 72 IN | HEART RATE: 82 BPM | SYSTOLIC BLOOD PRESSURE: 163 MMHG | TEMPERATURE: 99.3 F | DIASTOLIC BLOOD PRESSURE: 93 MMHG | BODY MASS INDEX: 26.76 KG/M2 | WEIGHT: 197.6 LBS

## 2017-12-06 DIAGNOSIS — Z79.60 LONG-TERM USE OF IMMUNOSUPPRESSANT MEDICATION: ICD-10-CM

## 2017-12-06 DIAGNOSIS — N18.30 CKD (CHRONIC KIDNEY DISEASE) STAGE 3, GFR 30-59 ML/MIN (H): ICD-10-CM

## 2017-12-06 DIAGNOSIS — D63.1 ANEMIA IN STAGE 3 CHRONIC KIDNEY DISEASE (H): ICD-10-CM

## 2017-12-06 DIAGNOSIS — N17.9 ACUTE KIDNEY INJURY (H): Primary | ICD-10-CM

## 2017-12-06 DIAGNOSIS — Z94.4 HISTORY OF LIVER TRANSPLANT (H): ICD-10-CM

## 2017-12-06 DIAGNOSIS — E55.9 VITAMIN D DEFICIENCY: ICD-10-CM

## 2017-12-06 DIAGNOSIS — I10 BENIGN ESSENTIAL HYPERTENSION: ICD-10-CM

## 2017-12-06 DIAGNOSIS — D64.9 ANEMIA, UNSPECIFIED TYPE: ICD-10-CM

## 2017-12-06 DIAGNOSIS — R79.89 ELEVATED SERUM CREATININE: ICD-10-CM

## 2017-12-06 DIAGNOSIS — N18.30 ANEMIA IN STAGE 3 CHRONIC KIDNEY DISEASE (H): ICD-10-CM

## 2017-12-06 LAB
ALBUMIN SERPL-MCNC: 3.1 G/DL (ref 3.4–5)
ALP SERPL-CCNC: 153 U/L (ref 40–150)
ALT SERPL W P-5'-P-CCNC: 21 U/L (ref 0–70)
ANION GAP SERPL CALCULATED.3IONS-SCNC: 6 MMOL/L (ref 3–14)
AST SERPL W P-5'-P-CCNC: 23 U/L (ref 0–45)
BILIRUB DIRECT SERPL-MCNC: 0.1 MG/DL (ref 0–0.2)
BILIRUB SERPL-MCNC: 0.4 MG/DL (ref 0.2–1.3)
BUN SERPL-MCNC: 29 MG/DL (ref 7–30)
CALCIUM SERPL-MCNC: 8 MG/DL (ref 8.5–10.1)
CHLORIDE SERPL-SCNC: 105 MMOL/L (ref 94–109)
CO2 SERPL-SCNC: 26 MMOL/L (ref 20–32)
CREAT SERPL-MCNC: 1.31 MG/DL (ref 0.66–1.25)
CREAT UR-MCNC: 81 MG/DL
ERYTHROCYTE [DISTWIDTH] IN BLOOD BY AUTOMATED COUNT: 15 % (ref 10–15)
GFR SERPL CREATININE-BSD FRML MDRD: 57 ML/MIN/1.7M2
GLUCOSE SERPL-MCNC: 196 MG/DL (ref 70–99)
HCT VFR BLD AUTO: 26.8 % (ref 40–53)
HGB BLD-MCNC: 8.8 G/DL (ref 13.3–17.7)
MAGNESIUM SERPL-MCNC: 1.8 MG/DL (ref 1.6–2.3)
MCH RBC QN AUTO: 31.2 PG (ref 26.5–33)
MCHC RBC AUTO-ENTMCNC: 32.8 G/DL (ref 31.5–36.5)
MCV RBC AUTO: 95 FL (ref 78–100)
PHOSPHATE SERPL-MCNC: 3.3 MG/DL (ref 2.5–4.5)
PLATELET # BLD AUTO: 61 10E9/L (ref 150–450)
POTASSIUM SERPL-SCNC: 5.2 MMOL/L (ref 3.4–5.3)
PROT SERPL-MCNC: 6.7 G/DL (ref 6.8–8.8)
PROT UR-MCNC: 0.66 G/L
PROT/CREAT 24H UR: 0.82 G/G CR (ref 0–0.2)
RBC # BLD AUTO: 2.82 10E12/L (ref 4.4–5.9)
SODIUM SERPL-SCNC: 137 MMOL/L (ref 133–144)
WBC # BLD AUTO: 4.4 10E9/L (ref 4–11)

## 2017-12-06 PROCEDURE — 84156 ASSAY OF PROTEIN URINE: CPT

## 2017-12-06 PROCEDURE — 99213 OFFICE O/P EST LOW 20 MIN: CPT | Mod: ZF

## 2017-12-06 PROCEDURE — 36415 COLL VENOUS BLD VENIPUNCTURE: CPT

## 2017-12-06 PROCEDURE — 80069 RENAL FUNCTION PANEL: CPT

## 2017-12-06 PROCEDURE — 84450 TRANSFERASE (AST) (SGOT): CPT

## 2017-12-06 PROCEDURE — 84075 ASSAY ALKALINE PHOSPHATASE: CPT

## 2017-12-06 PROCEDURE — 82247 BILIRUBIN TOTAL: CPT

## 2017-12-06 PROCEDURE — 82248 BILIRUBIN DIRECT: CPT

## 2017-12-06 PROCEDURE — 83735 ASSAY OF MAGNESIUM: CPT

## 2017-12-06 PROCEDURE — 84460 ALANINE AMINO (ALT) (SGPT): CPT

## 2017-12-06 PROCEDURE — 85027 COMPLETE CBC AUTOMATED: CPT

## 2017-12-06 PROCEDURE — 84155 ASSAY OF PROTEIN SERUM: CPT

## 2017-12-06 ASSESSMENT — PAIN SCALES - GENERAL: PAINLEVEL: SEVERE PAIN (7)

## 2017-12-06 NOTE — MR AVS SNAPSHOT
After Visit Summary   12/6/2017    Camacho Bhagat    MRN: [id number]           Patient Information     Date Of Birth          1965        Visit Information        Provider Department      12/6/2017 1:00 PM Cinda Cazares NP [facility] Nephrology        Today's Diagnoses     Acute kidney injury (H)    -  1    Vitamin D deficiency        CKD (chronic kidney disease) stage 3, GFR 30-59 ml/min        Benign essential hypertension        Anemia, unspecified type          Care Instructions    1. Begin Vit D (Cholecalciferol) 1000 units daily          Follow-ups after your visit        Follow-up notes from your care team     Return in about 8 weeks (around 1/31/2018).      Your next 10 appointments already scheduled     Dec 11, 2017  2:45 PM CST   (Arrive by 2:30 PM)   Return Vascular Visit with Estefani Beal MD   [facility] ([facility])    [address] [place] [address]  3rd [facility] [address]   [phone]            Dec 13, 2017 10:30 AM CST   (Arrive by 10:15 AM)   Return Visit with Sara Dinh MD   [facility] Colon and Rectal Surgery ([facility] [place])    [address] [place] [address]  4th [facility] [address]   [phone]            Dec 13, 2017 12:30 PM CST   (Arrive by 12:15 PM)   PAC EVALUATION with [facility] Pac [facility] 5   [facility] Preoperative [facility] [place] ([facility])    [address] [place] [address]  4th [facility] [address]   [phone]            Dec 13, 2017  1:30 PM CST   (Arrive by 1:15 PM)   PAC RN ASSESSMENT with Uc Pac Rn   [facility]operative [facility] [place] ([facility])    [address] [place] [address]   [phone]            Dec 13, 2017  2:00 PM CST   (Arrive by 1:45 PM)   PAC Anesthesia Consult with Jonathon Pac Anesthesiologist   [facility] Preoperative [facility] [place] ([facility])     [address] [address]   [phone]            Dec 15, 2017 10:30 AM CST   (Arrive by 10:15 AM)   RETURN HAND with Momo Noonan MD   [facility]opa[facility] ([facility])    [address] [place] [address] [phone]            Jan 05, 2018   Procedure with Sara Dinh MD   [facility] [place], Same Day Surgery (--)    [address] [facility] [address]   [phone]            Jan 31, 2018  1:00 PM CST   (Arrive by 12:30 PM)   Return Visit with Cinda Cazares NP   [facility] Nephrology ([facility])    [address] [place] [address]   [phone]            Mar 02, 2018 10:45 AM CST   (Arrive by 10:30 AM)   Return Liver Transplant with Toñito Camejo MD   [facility] Hepatology ([facility])    [address] [place] [id number] [phone]              Who to contact     If you have questions or need follow up information about today's clinic visit or your schedule please contact [facility] NEPH[facility] directly at [phone].  Normal or non-critical lab and imaging results will be communicated to you by DecideQuickhart, letter or phone within 4 business days after the clinic has received the results. If you do not hear from us within 7 days, please contact the clinic through DecideQuickhart or phone. If you have a critical or abnormal lab result, we will notify you by phone as soon as possible.  Submit refill requests through [employer] or call your pharmacy and they will forward the refill request to us. Please allow 3 business days for your refill to be completed.          Additional Information About Your Visit        Wine[employer] Information     [employer] gives you secure access to your electronic health record. If you see a primary care provider, you can also send messages to your care team and make appointments. If you  have questions, please call your primary care clinic.  If you do not have a primary care provider, please call [phone] and they will assist you.        Care EveryWhere ID     This is your Care EveryWhere ID. This could be used by other organizations to access your [facility] medical records  [id number]        Your Vitals Were     Pulse Temperature Height Pulse Oximetry BMI (Body Mass Index)       82 99.3  F (37.4  C) (Oral) 1.829 m (6') 99% 26.8 kg/m2        Blood Pressure from Last 3 Encounters:   12/06/17 (!) 163/93   12/01/17 165/81   11/20/17 148/85    Weight from Last 3 Encounters:   12/06/17 89.6 kg (197 lb 9.6 oz)   12/01/17 87.4 kg (192 lb 9.6 oz)   11/17/17 82.1 kg (181 lb)              Today, you had the following     No orders found for display         Today's Medication Changes          These changes are accurate as of: 12/6/17  2:33 PM.  If you have any questions, ask your nurse or doctor.               Start taking these medicines.        Dose/Directions    cholecalciferol 1000 UNIT tablet   Commonly known as:  vitamin D3   Used for:  Vitamin D deficiency   Started by:  Cinda Cazares, NP        Dose:  1000 Units   Take 1 tablet (1,000 Units) by mouth daily   Quantity:  100 tablet   Refills:  3         These medicines have changed or have updated prescriptions.        Dose/Directions    amLODIPine 10 MG tablet   Commonly known as:  NORVASC   This may have changed:  when to take this   Used for:  HTN (hypertension)        Dose:  10 mg   Take 1 tablet (10 mg) by mouth daily   Quantity:  90 tablet   Refills:  3       loperamide 2 MG capsule   Commonly known as:  IMODIUM   This may have changed:  how much to take   Used for:  S/P right hemicolectomy        Dose:  4 mg   Take 2 capsules (4 mg) by mouth 2 times daily   Quantity:  120 capsule   Refills:  3       magnesium oxide 400 (241.3 MG) MG tablet   Commonly known as:  MAG-OX   This may have changed:  when to take this   Used for:   Hypomagnesemia, CKD (chronic kidney disease) stage 3, GFR 30-59 ml/min        Dose:  400 mg   Take 1 tablet (400 mg) by mouth 2 times daily   Quantity:  180 tablet   Refills:  3            Where to get your medicines      These medications were sent to [facility] [id number] [place] [address] - [address] [place] [address] AT [address]  [address] [facility] [address]     Phone:  [phone]     cholecalciferol 1000 UNIT tablet                Primary Care Provider Office Phone # Fax #    Shay Kirkpatrick [phone]       [address] [place] [address] [address]  [facility] [address]        Equal Access to Services     FRANKLIN PORTILLO : Todd Carlisle, waaxda luqadaha, qaybta kaalmada adeegyada, devi duckworth. So [facility] [phone].    ATENCIÓN: Si habla español, tiene a zheng disposición servicios gratuitos de asistencia lingüística. [facility] [phone].    We comply with applicable federal civil rights laws and [place] laws. We do not discriminate on the basis of race, color, national origin, age, disability, sex, sexual orientation, or gender identity.            Thank you!     Thank you for choosing [facility]  for your care. Our goal is always to provide you with excellent care. Hearing back from our patients is one way we can continue to improve our services. Please take a few minutes to complete the written survey that you may receive in the mail after your visit with us. Thank you!             Your Updated Medication List - Protect others around you: Learn how to safely use, store and throw away your medicines at www.disposemymeds.org.          This list is accurate as of: 12/6/17  2:33 PM.  Always use your most recent med list.                   Brand Name Dispense Instructions for use Diagnosis    amLODIPine 10 MG tablet    NORVASC    90 tablet    Take 1 tablet (10 mg) by mouth daily    HTN (hypertension)       cholecalciferol 1000 UNIT tablet     vitamin D3    100 tablet    Take 1 tablet (1,000 Units) by mouth daily    Vitamin D deficiency       CIALIS 5 MG tablet   Generic drug:  tadalafil      Take 5 mg by mouth as needed        cyclobenzaprine 10 MG tablet    FLEXERIL    90 tablet    Take 1 tablet (10 mg) by mouth 3 times daily as needed for muscle spasms    Immunosuppression (H)       fludrocortisone 0.1 MG tablet    FLORINEF    90 tablet    Take 3 tablets (0.3 mg) by mouth daily    Hyperkalemia       furosemide 20 MG tablet    LASIX    60 tablet    Take 1 tablet (20 mg) by mouth 2 times daily    Hyperkalemia       GABAPENTIN PO      Take 300 mg by mouth 3 times daily        insulin glargine 100 UNIT/ML injection    LANTUS     Inject 50 Units Subcutaneous every morning        linagliptin 5 MG Tabs tablet    TRADJENTA     Take 5 mg by mouth every evening        loperamide 2 MG capsule    IMODIUM    120 capsule    Take 2 capsules (4 mg) by mouth 2 times daily    S/P right hemicolectomy       magnesium oxide 400 (241.3 MG) MG tablet    MAG-OX    180 tablet    Take 1 tablet (400 mg) by mouth 2 times daily    Hypomagnesemia, CKD (chronic kidney disease) stage 3, GFR 30-59 ml/min       mycophenolic acid 360 MG EC tablet    GENERIC EQUIVALENT    120 tablet    Take 2 tablets (720 mg) by mouth every 12 hours    History of liver transplant (H), Long-term use of immunosuppressant medication       OMEPRAZOLE PO      Take 20 mg by mouth every morning        oxyCODONE IR 10 MG tablet    ROXICODONE    30 tablet    Take 1 tablet (10 mg) by mouth daily as needed for moderate to severe pain    Osteoarthritis       patiromer 8.4 G packet    VELTASSA    30 each    Take 8.4 g by mouth daily    Hyperkalemia       predniSONE 5 MG tablet    DELTASONE    30 tablet    Take 1 tablet (5 mg) by mouth daily    Liver transplant recipient (H), Long-term use of immunosuppressant medication       sodium bicarbonate 650 MG tablet     180 tablet    Take 2 tablets (1,300 mg) by mouth 3  times daily    Hyperkalemia       tacrolimus 1 MG capsule    GENERIC EQUIVALENT    60 capsule    Take 1 capsule (1 mg) by mouth every 12 hours    Liver transplant recipient (H)

## 2017-12-06 NOTE — LETTER
12/6/2017      RE: Camacho Bhagat  [address]  [facility] [address]       Nephrology Clinic Visit 12/6/17      Assessment and Plan:      1. EJ- Resolved. Creat down to 1.3 today. Previously baseline was ~1.0, since that time averages in the 1.6 range.    Etiology of EJ is likely multifactorial: CNI, recurrent EJ, prolonged pre renal state from colostomy output.    - Tacro being tapered off by Transplant   - Remain well hydrated and avoid hypotension   - Does not use NSAIDs     2. CKD3b w/proteinuria - Likely multifactorial; DM, recurrent EJ, CNI   - Avoid EJ if possible   - A1c goal is 7%. Patient reports BS < 180   - B/P goal is < 140/80. Home readings when not in pain ~ 120-150/ range     3. HTN - Clinic blood pressures 140-160/, but home pressures in the 120-150/ range. He did not bring in his machine. I think we will have more options for blood pressure control once he has his ostomy takedown. He does have mild edema, no dyspnea. Currently on Lasix 20 mg bid and Amlodipine 10 mg qd.    -  Would want to increase diuretics at f/u visit following ostomy takedown to improve blood pressures, edema and hyperkalemia     4. Hyperkalemia - Controlled with Valtassa and Florinef. K 5.2 today    -  Will plan on increasing diuretics post op which may help with controlling hyperkalemia. Possibly will be able to d/c Florinef/Valtassa   - Transplant is tapering off Tacro     5. Volume status - Mild hypervolemia. No dyspnea. No increase in stool output. Voiding w/o difficulty. Weight 190 # at home today. Was 190.4# last visit.    -  Continue Lasix 20 mg bid. Will increase next visit     6. Acid base - Bicarb 26 in setting of diarrhea stools, CKD. Currently on Bicarb 1300 mg TID   - Continue current dosing. Should be able to reduce or discontinue post op     7. BMD - Ca 8.0, Phos 3.3, albumin 3.1   - Vitamin D 20 and PTH 34 (9/14/17)   - Will begin Vit D 1000 U today     8. Hypomagnesemia - Mag 1.8   -  Continue  Mag oxide 400 mg qd     9. Anemia - Improving. Hgb 8.8. Etiology felt to be 2/2 CKD and decreased absorption 2/2 bowel resection   - Iron studies 11/17: Fe 93, ferritin 1748, tsat 50%   - Last RBC 10/24/17   - Hemolysis w/u neg during hosp admisssion   - Given Aranesp 25 mcg x 1 on 10/27/17   -  Not clear patient really needs DIPAK at this time. His Hgb is slowly improving and he is hesitant to start it. May increase following ostomy takedown. Will reassess at next visit     10. Liver transplant IS: Tacrolimus, MMF, Pred   - Tacro being taper off per Transplant, now on 1 mg bid     11. Dispo- RCT 2 months     Assessment and plan was discussed with patient and he voiced his understanding and agreement.     Reason for Visit:  Post hospital EJ/Hyperkalemia f/u      HPI:  Mr Bhagat is a 51 yo male in today for EJ/CKD f/u. He has struggled with hyperkalemia attributed to Tacrolimus ( currently being tapered down) and recurrent EJ 2/2 large volume ostomy output. He is scheduled for ostomy takedown 1/5/18. He was started on Valtassa on 11/1/17.      Baseline Cr: 1.3 range     ROS:  Feeling well. Looking forward to ostomy takedown next month. Reports improved appetite. Denies CP, dyspnea, abdominal pain, No voiding issues. Walks his dog daily. Empties his colostomy bag about 4 times/daily at baseline. Home blood pressues 120's- 150/.       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 Former smoker.     Allergies:  Allergies   Allergen Reactions     Erythromycin GI Disturbance     Vioxx      Nausea, vomiting       Medications:  Prior to Admission medications    Medication Sig Start Date End Date  Taking? Authorizing Provider   cholecalciferol (VITAMIN D3) 1000 UNIT tablet Take 1 tablet (1,000 Units) by mouth daily 12/6/17  Yes Cinda Cazares NP   predniSONE (DELTASONE) 5 MG tablet Take 1 tablet (5 mg) by mouth daily 11/21/17  Yes Toñito Camejo MD   mycophenolic acid (GENERIC EQUIVALENT) 360 MG EC tablet Take 2 tablets (720 mg) by mouth every 12 hours 11/20/17  Yes Jovan Tran MD   patiromer (VELTASSA) 8.4 G packet Take 8.4 g by mouth daily 11/17/17  Yes Ninfa Hernández MD   magnesium oxide (MAG-OX) 400 (241.3 MG) MG tablet Take 1 tablet (400 mg) by mouth 2 times daily  Patient taking differently: Take 400 mg by mouth daily  11/14/17  Yes Cinda Cazares NP   tacrolimus (GENERIC EQUIVALENT) 1 MG capsule Take 1 capsule (1 mg) by mouth every 12 hours 11/9/17  Yes Toñito Camejo MD   fludrocortisone (FLORINEF) 0.1 MG tablet Take 3 tablets (0.3 mg) by mouth daily 11/8/17  Yes Ninfa Hernández MD   amLODIPine (NORVASC) 10 MG tablet Take 1 tablet (10 mg) by mouth daily  Patient taking differently: Take 10 mg by mouth every morning  11/7/17  Yes Ninfa Hernández MD   oxyCODONE IR (ROXICODONE) 10 MG tablet Take 1 tablet (10 mg) by mouth daily as needed for moderate to severe pain 11/6/17  Yes Shay Kirkpatrick MD   CIALIS 5 MG tablet Take 5 mg by mouth as needed  6/7/17  Yes Reported, Patient   furosemide (LASIX) 20 MG tablet Take 1 tablet (20 mg) by mouth 2 times daily 11/1/17  Yes Cinda Cazares NP   sodium bicarbonate 650 MG tablet Take 2 tablets (1,300 mg) by mouth 3 times daily 10/27/17  Yes Sherry Albert MD   OMEPRAZOLE PO Take 20 mg by mouth every morning    Yes Reported, Patient   GABAPENTIN PO Take 300 mg by mouth 3 times daily   Yes Reported, Patient   insulin glargine (LANTUS) 100 UNIT/ML injection Inject 50 Units Subcutaneous every morning   Yes Reported, Patient   linagliptin (TRADJENTA) 5 MG TABS tablet Take 5 mg by mouth every evening    Yes Reported, Patient    loperamide (IMODIUM) 2 MG capsule Take 2 capsules (4 mg) by mouth 2 times daily  Patient taking differently: Take 2 mg by mouth 2 times daily  9/8/17  Yes Felicia Tolliver APRN CNP   cyclobenzaprine (FLEXERIL) 10 MG tablet Take 1 tablet (10 mg) by mouth 3 times daily as needed for muscle spasms 9/8/17  Yes Felicia Tolliver APRN CNP       Vitals:  BP (!) 163/93  Pulse 82  Temp 99.3  F (37.4  C) (Oral)  Ht 1.829 m (6')  Wt 89.6 kg (197 lb 9.6 oz)  SpO2 99%  BMI 26.8 kg/m2    Exam:  Gen: Calm, pleasant and interactive  CARDIAC: RRR  LUNGS: CTA  ABDOMEN: Colostomy present, Abd NT  EXT: 1+ edema    Results:  Results for RONNIE ARIZMENDI (MRN [id number]) as of 12/6/2017 13:29   Ref. Range 12/6/2017 11:30 12/6/2017 12:30   Sodium Latest Ref Range: 133 - 144 mmol/L  137   Potassium Latest Ref Range: 3.4 - 5.3 mmol/L  5.2   Chloride Latest Ref Range: 94 - 109 mmol/L  105   Carbon Dioxide Latest Ref Range: 20 - 32 mmol/L  26   Urea Nitrogen Latest Ref Range: 7 - 30 mg/dL  29   Creatinine Latest Ref Range: 0.66 - 1.25 mg/dL  1.31 (H)   GFR Estimate Latest Ref Range: >60 mL/min/1.7m2  57 (L)   GFR Estimate If Black Latest Ref Range: >60 mL/min/1.7m2  69   Calcium Latest Ref Range: 8.5 - 10.1 mg/dL  8.0 (L)   Anion Gap Latest Ref Range: 3 - 14 mmol/L  6   Magnesium Latest Ref Range: 1.6 - 2.3 mg/dL  1.8   Phosphorus Latest Ref Range: 2.5 - 4.5 mg/dL  3.3   Albumin Latest Ref Range: 3.4 - 5.0 g/dL  3.1 (L)   Protein Total Latest Ref Range: 6.8 - 8.8 g/dL  6.7 (L)   Bilirubin Total Latest Ref Range: 0.2 - 1.3 mg/dL  0.4   Alkaline Phosphatase Latest Ref Range: 40 - 150 U/L  153 (H)   ALT Latest Ref Range: 0 - 70 U/L  21   AST Latest Ref Range: 0 - 45 U/L  23   Bilirubin Direct Latest Ref Range: 0.0 - 0.2 mg/dL  0.1   Creatinine Urine Latest Units: mg/dL 81    Protein Random Urine Latest Units: g/L 0.66    Protein Total Urine g/gr Creatinine Latest Ref Range: 0 - 0.2 g/g Cr 0.82 (H)    Glucose Latest Ref Range: 70 - 99  mg/dL  196 (H)   WBC Latest Ref Range: 4.0 - 11.0 10e9/L  4.4   Hemoglobin Latest Ref Range: 13.3 - 17.7 g/dL  8.8 (L)   Hematocrit Latest Ref Range: 40.0 - 53.0 %  26.8 (L)   Platelet Count Latest Ref Range: 150 - 450 10e9/L  61 (L)   RBC Count Latest Ref Range: 4.4 - 5.9 10e12/L  2.82 (L)   MCV Latest Ref Range: 78 - 100 fl  95   MCH Latest Ref Range: 26.5 - 33.0 pg  31.2   MCHC Latest Ref Range: 31.5 - 36.5 g/dL  32.8   RDW Latest Ref Range: 10.0 - 15.0 %  15.0     Cinda Cazares, NP

## 2017-12-06 NOTE — NURSING NOTE
Chief Complaint   Patient presents with     RECHECK     Follow up Elevated serum creatinine.       Initial BP (!) 163/93  Pulse 82  Temp 99.3  F (37.4  C) (Oral)  Ht 1.829 m (6')  Wt 89.6 kg (197 lb 9.6 oz)  SpO2 99%  BMI 26.8 kg/m2 Estimated body mass index is 26.8 kg/(m^2) as calculated from the following:    Height as of this encounter: 1.829 m (6').    Weight as of this encounter: 89.6 kg (197 lb 9.6 oz).  Medication Reconciliation: complete.  Lauren Dillon., CMA

## 2017-12-06 NOTE — PROGRESS NOTES
Nephrology Clinic Visit 12/6/17      Assessment and Plan:      1. EJ- Resolved. Creat down to 1.3 today. Previously baseline was ~1.0, since that time averages in the 1.6 range.    Etiology of EJ is likely multifactorial: CNI, recurrent EJ, prolonged pre renal state from colostomy output.    - Tacro being tapered off by Transplant   - Remain well hydrated and avoid hypotension   - Does not use NSAIDs     2. CKD3b w/proteinuria - Likely multifactorial; DM, recurrent EJ, CNI   - Avoid EJ if possible   - A1c goal is 7%. Patient reports BS < 180   - B/P goal is < 140/80. Home readings when not in pain ~ 120-150/ range     3. HTN - Clinic blood pressures 140-160/, but home pressures in the 120-150/ range. He did not bring in his machine. I think we will have more options for blood pressure control once he has his ostomy takedown. He does have mild edema, no dyspnea. Currently on Lasix 20 mg bid and Amlodipine 10 mg qd.    - Would want to increase diuretics at f/u visit following ostomy takedown to improve blood pressures, edema and hyperkalemia     4. Hyperkalemia - Controlled with Valtassa and Florinef. K 5.2 today    - Will plan on increasing diuretics post op which may help with controlling hyperkalemia. Possibly will be able to d/c Florinef/Valtassa   - Transplant is tapering off Tacro     5. Volume status - Mild hypervolemia. No dyspnea. No increase in stool output. Voiding w/o difficulty. Weight 190 # at home today. Was 190.4# last visit.    - Continue Lasix 20 mg bid. Will increase next visit     6. Acid base - Bicarb 26 in setting of diarrhea stools, CKD. Currently on Bicarb 1300 mg TID   - Continue current dosing. Should be able to reduce or discontinue post op     7. BMD - Ca 8.0, Phos 3.3, albumin 3.1   - Vitamin D 20 and PTH 34 (9/14/17)   - Will begin Vit D 1000 U today     8. Hypomagnesemia - Mag 1.8   - Continue Mag oxide 400 mg qd     9. Anemia - Improving. Hgb 8.8. Etiology felt to be 2/2 CKD and  decreased absorption 2/2 bowel resection   - Iron studies 11/17: Fe 93, ferritin 1748, tsat 50%   - Last RBC 10/24/17   - Hemolysis w/u neg during hosp admisssion   - Given Aranesp 25 mcg x 1 on 10/27/17   - Not clear patient really needs DIPAK at this time. His Hgb is slowly improving and he is hesitant to start it. May increase following ostomy takedown. Will reassess at next visit     10. Liver transplant IS: Tacrolimus, MMF, Pred   - Tacro being taper off per Transplant, now on 1 mg bid     11. Dispo- RCT 2 months     Assessment and plan was discussed with patient and he voiced his understanding and agreement.     Reason for Visit:  Post hospital EJ/Hyperkalemia f/u      HPI:  Mr Bhagat is a 51 yo male in today for EJ/CKD f/u. He has struggled with hyperkalemia attributed to Tacrolimus ( currently being tapered down) and recurrent EJ 2/2 large volume ostomy output. He is scheduled for ostomy takedown 1/5/18. He was started on Valtassa on 11/1/17.      Baseline Cr: 1.3 range     ROS:  Feeling well. Looking forward to ostomy takedown next month. Reports improved appetite. Denies CP, dyspnea, abdominal pain, No voiding issues. Walks his dog daily. Empties his colostomy bag about 4 times/daily at baseline. Home blood pressues 120's- 150/.       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 Former smoker.     Allergies:  Allergies   Allergen Reactions     Erythromycin GI Disturbance     Vioxx      Nausea, vomiting       Medications:  Prior to Admission medications    Medication Sig Start Date End Date Taking? Authorizing Provider   cholecalciferol (VITAMIN D3) 1000 UNIT tablet Take 1 tablet  (1,000 Units) by mouth daily 12/6/17  Yes Cinda Cazares NP   predniSONE (DELTASONE) 5 MG tablet Take 1 tablet (5 mg) by mouth daily 11/21/17  Yes Toñito Camejo MD   mycophenolic acid (GENERIC EQUIVALENT) 360 MG EC tablet Take 2 tablets (720 mg) by mouth every 12 hours 11/20/17  Yes Jovan Tran MD   patiromer (VELTASSA) 8.4 G packet Take 8.4 g by mouth daily 11/17/17  Yes Ninfa Hernández MD   magnesium oxide (MAG-OX) 400 (241.3 MG) MG tablet Take 1 tablet (400 mg) by mouth 2 times daily  Patient taking differently: Take 400 mg by mouth daily  11/14/17  Yes Cinda Cazares NP   tacrolimus (GENERIC EQUIVALENT) 1 MG capsule Take 1 capsule (1 mg) by mouth every 12 hours 11/9/17  Yes Toñito Camejo MD   fludrocortisone (FLORINEF) 0.1 MG tablet Take 3 tablets (0.3 mg) by mouth daily 11/8/17  Yes Ninfa Hernández MD   amLODIPine (NORVASC) 10 MG tablet Take 1 tablet (10 mg) by mouth daily  Patient taking differently: Take 10 mg by mouth every morning  11/7/17  Yes Ninfa Hernández MD   oxyCODONE IR (ROXICODONE) 10 MG tablet Take 1 tablet (10 mg) by mouth daily as needed for moderate to severe pain 11/6/17  Yes Shay Kirkpatrick MD   CIALIS 5 MG tablet Take 5 mg by mouth as needed  6/7/17  Yes Reported, Patient   furosemide (LASIX) 20 MG tablet Take 1 tablet (20 mg) by mouth 2 times daily 11/1/17  Yes Cinda Cazares NP   sodium bicarbonate 650 MG tablet Take 2 tablets (1,300 mg) by mouth 3 times daily 10/27/17  Yes Sherry Albert MD   OMEPRAZOLE PO Take 20 mg by mouth every morning    Yes Reported, Patient   GABAPENTIN PO Take 300 mg by mouth 3 times daily   Yes Reported, Patient   insulin glargine (LANTUS) 100 UNIT/ML injection Inject 50 Units Subcutaneous every morning   Yes Reported, Patient   linagliptin (TRADJENTA) 5 MG TABS tablet Take 5 mg by mouth every evening    Yes Reported, Patient   loperamide (IMODIUM) 2 MG capsule Take 2 capsules (4 mg) by mouth 2 times daily  Patient  taking differently: Take 2 mg by mouth 2 times daily  9/8/17  Yes Felicia Tolliver APRN CNP   cyclobenzaprine (FLEXERIL) 10 MG tablet Take 1 tablet (10 mg) by mouth 3 times daily as needed for muscle spasms 9/8/17  Yes Felicia Tolliver APRN CNP       Vitals:  BP (!) 163/93  Pulse 82  Temp 99.3  F (37.4  C) (Oral)  Ht 1.829 m (6')  Wt 89.6 kg (197 lb 9.6 oz)  SpO2 99%  BMI 26.8 kg/m2    Exam:  Gen: Calm, pleasant and interactive  CARDIAC: RRR  LUNGS: CTA  ABDOMEN: Colostomy present, Abd NT  EXT: 1+ edema    Results:  Results for RONNIE ARIZMENDI (MRN [id number]) as of 12/6/2017 13:29   Ref. Range 12/6/2017 11:30 12/6/2017 12:30   Sodium Latest Ref Range: 133 - 144 mmol/L  137   Potassium Latest Ref Range: 3.4 - 5.3 mmol/L  5.2   Chloride Latest Ref Range: 94 - 109 mmol/L  105   Carbon Dioxide Latest Ref Range: 20 - 32 mmol/L  26   Urea Nitrogen Latest Ref Range: 7 - 30 mg/dL  29   Creatinine Latest Ref Range: 0.66 - 1.25 mg/dL  1.31 (H)   GFR Estimate Latest Ref Range: >60 mL/min/1.7m2  57 (L)   GFR Estimate If Black Latest Ref Range: >60 mL/min/1.7m2  69   Calcium Latest Ref Range: 8.5 - 10.1 mg/dL  8.0 (L)   Anion Gap Latest Ref Range: 3 - 14 mmol/L  6   Magnesium Latest Ref Range: 1.6 - 2.3 mg/dL  1.8   Phosphorus Latest Ref Range: 2.5 - 4.5 mg/dL  3.3   Albumin Latest Ref Range: 3.4 - 5.0 g/dL  3.1 (L)   Protein Total Latest Ref Range: 6.8 - 8.8 g/dL  6.7 (L)   Bilirubin Total Latest Ref Range: 0.2 - 1.3 mg/dL  0.4   Alkaline Phosphatase Latest Ref Range: 40 - 150 U/L  153 (H)   ALT Latest Ref Range: 0 - 70 U/L  21   AST Latest Ref Range: 0 - 45 U/L  23   Bilirubin Direct Latest Ref Range: 0.0 - 0.2 mg/dL  0.1   Creatinine Urine Latest Units: mg/dL 81    Protein Random Urine Latest Units: g/L 0.66    Protein Total Urine g/gr Creatinine Latest Ref Range: 0 - 0.2 g/g Cr 0.82 (H)    Glucose Latest Ref Range: 70 - 99 mg/dL  196 (H)   WBC Latest Ref Range: 4.0 - 11.0 10e9/L  4.4   Hemoglobin Latest Ref Range:  13.3 - 17.7 g/dL  8.8 (L)   Hematocrit Latest Ref Range: 40.0 - 53.0 %  26.8 (L)   Platelet Count Latest Ref Range: 150 - 450 10e9/L  61 (L)   RBC Count Latest Ref Range: 4.4 - 5.9 10e12/L  2.82 (L)   MCV Latest Ref Range: 78 - 100 fl  95   MCH Latest Ref Range: 26.5 - 33.0 pg  31.2   MCHC Latest Ref Range: 31.5 - 36.5 g/dL  32.8   RDW Latest Ref Range: 10.0 - 15.0 %  15.0

## 2017-12-08 ENCOUNTER — TELEPHONE (OUTPATIENT)
Dept: INTERNAL MEDICINE | Facility: CLINIC | Age: 52
End: 2017-12-08

## 2017-12-08 NOTE — TELEPHONE ENCOUNTER
----- Message from Will García sent at 12/8/2017 12:22 PM CST -----  Regarding: oxyCODONE IR (ROXICODONE) 10 MG tablet  Contact: [phone]  Pt of Dr Kaya MOORE [employer] representative called to let us know that the medication authorization that was faxed 4-5 days ago was not received.     Pt is having a really hard time getting the medication authorized.     If possible, please call the pharmacy operations phone number to expedite this process. Ph # : [phone]    Authorization may also be faxed to [phone] but this will take up to 3 days, so please call to speed up the process because Pt has been having a hard time getting this medication.      Please also call Pt back as soon as everything is good to go.     Thank you!  CH  Please DO NOT send this message and/or reply back to sender. Call Center Representatives DO NOT respond to messages.

## 2017-12-11 ENCOUNTER — OFFICE VISIT (OUTPATIENT)
Dept: VASCULAR SURGERY | Facility: CLINIC | Age: 52
End: 2017-12-11

## 2017-12-11 VITALS
OXYGEN SATURATION: 98 % | RESPIRATION RATE: 17 BRPM | DIASTOLIC BLOOD PRESSURE: 92 MMHG | HEART RATE: 80 BPM | SYSTOLIC BLOOD PRESSURE: 174 MMHG

## 2017-12-11 DIAGNOSIS — Z79.60 LONG-TERM USE OF IMMUNOSUPPRESSANT MEDICATION: ICD-10-CM

## 2017-12-11 DIAGNOSIS — N18.30 CKD (CHRONIC KIDNEY DISEASE) STAGE 3, GFR 30-59 ML/MIN (H): ICD-10-CM

## 2017-12-11 DIAGNOSIS — Z94.4 HISTORY OF LIVER TRANSPLANT (H): ICD-10-CM

## 2017-12-11 DIAGNOSIS — N18.30 ANEMIA IN STAGE 3 CHRONIC KIDNEY DISEASE (H): ICD-10-CM

## 2017-12-11 DIAGNOSIS — I73.9 PVD (PERIPHERAL VASCULAR DISEASE) (H): Primary | ICD-10-CM

## 2017-12-11 DIAGNOSIS — D63.1 ANEMIA IN STAGE 3 CHRONIC KIDNEY DISEASE (H): ICD-10-CM

## 2017-12-11 LAB
ALBUMIN SERPL-MCNC: 3.2 G/DL (ref 3.4–5)
ALP SERPL-CCNC: 134 U/L (ref 40–150)
ALT SERPL W P-5'-P-CCNC: 18 U/L (ref 0–70)
ANION GAP SERPL CALCULATED.3IONS-SCNC: 6 MMOL/L (ref 3–14)
AST SERPL W P-5'-P-CCNC: 23 U/L (ref 0–45)
BILIRUB DIRECT SERPL-MCNC: 0.1 MG/DL (ref 0–0.2)
BILIRUB SERPL-MCNC: 0.4 MG/DL (ref 0.2–1.3)
BUN SERPL-MCNC: 22 MG/DL (ref 7–30)
CALCIUM SERPL-MCNC: 8.2 MG/DL (ref 8.5–10.1)
CHLORIDE SERPL-SCNC: 106 MMOL/L (ref 94–109)
CO2 SERPL-SCNC: 27 MMOL/L (ref 20–32)
CREAT SERPL-MCNC: 1.23 MG/DL (ref 0.66–1.25)
ERYTHROCYTE [DISTWIDTH] IN BLOOD BY AUTOMATED COUNT: 14.7 % (ref 10–15)
FERRITIN SERPL-MCNC: 1129 NG/ML (ref 26–388)
GFR SERPL CREATININE-BSD FRML MDRD: 62 ML/MIN/1.7M2
GLUCOSE SERPL-MCNC: 164 MG/DL (ref 70–99)
HCT VFR BLD AUTO: 27.2 % (ref 40–53)
HGB BLD-MCNC: 8.6 G/DL (ref 13.3–17.7)
IRON SATN MFR SERPL: 28 % (ref 15–46)
IRON SERPL-MCNC: 53 UG/DL (ref 35–180)
MAGNESIUM SERPL-MCNC: 1.8 MG/DL (ref 1.6–2.3)
MCH RBC QN AUTO: 30.4 PG (ref 26.5–33)
MCHC RBC AUTO-ENTMCNC: 31.6 G/DL (ref 31.5–36.5)
MCV RBC AUTO: 96 FL (ref 78–100)
PHOSPHATE SERPL-MCNC: 2.9 MG/DL (ref 2.5–4.5)
PLATELET # BLD AUTO: 64 10E9/L (ref 150–450)
POTASSIUM SERPL-SCNC: 4.6 MMOL/L (ref 3.4–5.3)
PROT SERPL-MCNC: 7.2 G/DL (ref 6.8–8.8)
RBC # BLD AUTO: 2.83 10E12/L (ref 4.4–5.9)
SODIUM SERPL-SCNC: 140 MMOL/L (ref 133–144)
TIBC SERPL-MCNC: 186 UG/DL (ref 240–430)
WBC # BLD AUTO: 4.3 10E9/L (ref 4–11)

## 2017-12-11 ASSESSMENT — PAIN SCALES - GENERAL: PAINLEVEL: NO PAIN (0)

## 2017-12-11 NOTE — MR AVS SNAPSHOT
After Visit Summary   12/11/2017    Camacho Bhagat    MRN: [id number]           Patient Information     Date Of Birth          1965        Visit Information        Provider Department      12/11/2017 2:45 PM Estefani Beal MD [facility] Vascular [facility]         Follow-ups after your visit        Your next 10 appointments already scheduled     Dec 13, 2017 10:30 AM CST   (Arrive by 10:15 AM)   Return Visit with Sara Dinh MD   [facility] Colon and Rectal Surgery ([facility] [place])    [address] [address]   [phone]            Dec 13, 2017 12:30 PM CST   (Arrive by 12:15 PM)   PAC EVALUATION with Uc Pac Mary 5   [facility] Preoperative Assessment [place] ([facility] [place])    [address] [place] [address]   [phone]            Dec 13, 2017  1:30 PM CST   (Arrive by 1:15 PM)   PAC RN ASSESSMENT with Uc Pac Rn   [facility] ([facility] [place])    [address] [place] [address]   [phone]            Dec 13, 2017  2:00 PM CST   (Arrive by 1:45 PM)   PAC Anesthesia Consult with  Pac Anesthesiologist   [facility] [place] ([facility] [place])    [address] [place] [address]   [phone]            Dec 15, 2017 10:30 AM CST   (Arrive by 10:15 AM)   RETURN HAND with Momo Noonan MD   [facility] Orthopaedic Clinic ([facility] [place])    [address] [place] [address]   [phone]            Jan 05, 2018   Procedure with Sara Dinh MD   [facility] [place], Same Day Surgery (--)    [address] [facility] [address]   [phone]            Jan 16, 2018 11:00 AM CST   (Arrive by 10:45 AM)   Return Vascular Visit with LAMIN Lopez [facility]  ([facility])    [address] [facility]  [address] [facility] [address] [phone]            Jan 31, 2018  1:00 PM CST   (Arrive by 12:30 PM)   Return Visit with Cinda Cazares NP   [facility] Nephrology ([facility])    [address] [id number] [phone]            Mar 02, 2018 10:45 AM CST   (Arrive by 10:30 AM)   Return Liver Transplant with Toñito Camejo MD   [facility] Hepatology ([facility])    [address] [facility]  [address] [facility] [id number] [phone]              Who to contact     Please call your clinic at [phone] to:    Ask questions about your health    Make or cancel appointments    Discuss your medicines    Learn about your test results    Speak to your doctor   If you have compliments or concerns about an experience at your clinic, or if you wish to file a complaint, please contact [facility] Patient Relations at [phone] or email us at [email or web address]         Additional Information About Your Visit        [employer] Information     [employer] gives you secure access to your electronic health record. If you see a primary care provider, you can also send messages to your care team and make appointments. If you have questions, please call your primary care clinic.  If you do not have a primary care provider, please call [phone] and they will assist you.      [employer] is an electronic gateway that provides easy, online access to your medical records. With [employer], you can request a clinic appointment, read your test results, renew a prescription or communicate with your care team.     To access your existing account, please contact your [facility] Clinic or call [phone] for assistance.        Care EveryWhere ID     This is your Care EveryWhere ID. This could be used by other organizations to  access your [place] medical records  [id number]        Your Vitals Were     Pulse Respirations Pulse Oximetry             80 17 98%          Blood Pressure from Last 3 Encounters:   12/11/17 (!) 174/92   12/06/17 (!) 163/93   12/01/17 165/81    Weight from Last 3 Encounters:   12/06/17 197 lb 9.6 oz   12/01/17 192 lb 9.6 oz   11/17/17 181 lb              Today, you had the following     No orders found for display         Today's Medication Changes          These changes are accurate as of: 12/11/17  2:53 PM.  If you have any questions, ask your nurse or doctor.               These medicines have changed or have updated prescriptions.        Dose/Directions    amLODIPine 10 MG tablet   Commonly known as:  NORVASC   This may have changed:  when to take this   Used for:  HTN (hypertension)        Dose:  10 mg   Take 1 tablet (10 mg) by mouth daily   Quantity:  90 tablet   Refills:  3       loperamide 2 MG capsule   Commonly known as:  IMODIUM   This may have changed:  how much to take   Used for:  S/P right hemicolectomy        Dose:  4 mg   Take 2 capsules (4 mg) by mouth 2 times daily   Quantity:  120 capsule   Refills:  3       magnesium oxide 400 (241.3 MG) MG tablet   Commonly known as:  MAG-OX   This may have changed:  when to take this   Used for:  Hypomagnesemia, CKD (chronic kidney disease) stage 3, GFR 30-59 ml/min        Dose:  400 mg   Take 1 tablet (400 mg) by mouth 2 times daily   Quantity:  180 tablet   Refills:  3                Primary Care Provider Office Phone # Fax #    Shay Kirkpatrick [phone]       [address] [place] [address]        Equal Access to Services     FRANKLIN PORTILLO AH: Hadii tavo swartz hadashradha Sojose, waaxda luqadaha, qaybta kaalmada adesammi, devi duckworth. So [facility] [phone].    ATENCIÓN: Si habla español, tiene a zheng disposición servicios gratuitos de asistencia lingüística. Llame al [phone].    We comply with  applicable federal civil rights laws and [place] laws. We do not discriminate on the basis of race, color, national origin, age, disability, sex, sexual orientation, or gender identity.            Thank you!     Thank you for choosing [facility]  for your care. Our goal is always to provide you with excellent care. Hearing back from our patients is one way we can continue to improve our services. Please take a few minutes to complete the written survey that you may receive in the mail after your visit with us. Thank you!             Your Updated Medication List - Protect others around you: Learn how to safely use, store and throw away your medicines at www.disposemymeds.org.          This list is accurate as of: 12/11/17  2:53 PM.  Always use your most recent med list.                   Brand Name Dispense Instructions for use Diagnosis    amLODIPine 10 MG tablet    NORVASC    90 tablet    Take 1 tablet (10 mg) by mouth daily    HTN (hypertension)       cholecalciferol 1000 UNIT tablet    vitamin D3    100 tablet    Take 1 tablet (1,000 Units) by mouth daily    Vitamin D deficiency       CIALIS 5 MG tablet   Generic drug:  tadalafil      Take 5 mg by mouth as needed        cyclobenzaprine 10 MG tablet    FLEXERIL    90 tablet    Take 1 tablet (10 mg) by mouth 3 times daily as needed for muscle spasms    Immunosuppression (H)       fludrocortisone 0.1 MG tablet    FLORINEF    90 tablet    Take 3 tablets (0.3 mg) by mouth daily    Hyperkalemia       furosemide 20 MG tablet    LASIX    60 tablet    Take 1 tablet (20 mg) by mouth 2 times daily    Hyperkalemia       GABAPENTIN PO      Take 300 mg by mouth 3 times daily        insulin glargine 100 UNIT/ML injection    LANTUS     Inject 50 Units Subcutaneous every morning        linagliptin 5 MG Tabs tablet    TRADJENTA     Take 5 mg by mouth every evening        loperamide 2 MG capsule    IMODIUM    120 capsule    Take 2 capsules (4 mg) by mouth 2 times  daily    S/P right hemicolectomy       magnesium oxide 400 (241.3 MG) MG tablet    MAG-OX    180 tablet    Take 1 tablet (400 mg) by mouth 2 times daily    Hypomagnesemia, CKD (chronic kidney disease) stage 3, GFR 30-59 ml/min       mycophenolic acid 360 MG EC tablet    GENERIC EQUIVALENT    120 tablet    Take 2 tablets (720 mg) by mouth every 12 hours    History of liver transplant (H), Long-term use of immunosuppressant medication       OMEPRAZOLE PO      Take 20 mg by mouth every morning        oxyCODONE IR 10 MG tablet    ROXICODONE    30 tablet    Take 1 tablet (10 mg) by mouth daily as needed for moderate to severe pain    Osteoarthritis       patiromer 8.4 G packet    VELTASSA    30 each    Take 8.4 g by mouth daily    Hyperkalemia       predniSONE 5 MG tablet    DELTASONE    30 tablet    Take 1 tablet (5 mg) by mouth daily    Liver transplant recipient (H), Long-term use of immunosuppressant medication       sodium bicarbonate 650 MG tablet     180 tablet    Take 2 tablets (1,300 mg) by mouth 3 times daily    Hyperkalemia       tacrolimus 1 MG capsule    GENERIC EQUIVALENT    60 capsule    Take 1 capsule (1 mg) by mouth every 12 hours    Liver transplant recipient (H)

## 2017-12-11 NOTE — LETTER
12/11/2017       RE: Camacho Bhagat  [address] [facility] [address]     Dear Colleague,    Thank you for referring your patient, Camacho Bhagat, to the [facility] at [facility]. Please see a copy of my visit note below.    VASCULAR SURGERY CLINIC ESTABLISHED PATIENT NOTE    HPI:    Camacho Bhagat is a 52 year old male with past medical history of CASTAÑEDA cirrhosis status post liver transplant in March, who was hospitalized a couple months ago for sepsis secondary to typhlitis on vasopressor support and developed gangrene of his right index finger and right great toe.  He returns to clinic today for wound check.        SUBJECTIVE:  Denies any right foot pain.  Denies any recent fever, chills or night sweats.    OBJECTIVE:  Vital signs:  BP (!) 174/92 (BP Location: Left arm)  Pulse 80  Resp 17  SpO2 98%      Prior to Admission medications    Medication Sig Start Date End Date Taking? Authorizing Provider   cholecalciferol (VITAMIN D3) 1000 UNIT tablet Take 1 tablet (1,000 Units) by mouth daily 12/6/17   Cinda Cazares NP   predniSONE (DELTASONE) 5 MG tablet Take 1 tablet (5 mg) by mouth daily 11/21/17   Toñito Camejo MD   mycophenolic acid (GENERIC EQUIVALENT) 360 MG EC tablet Take 2 tablets (720 mg) by mouth every 12 hours 11/20/17   Jovan Tran MD   patiromer (VELTASSA) 8.4 G packet Take 8.4 g by mouth daily 11/17/17   Ninfa Hernández MD   magnesium oxide (MAG-OX) 400 (241.3 MG) MG tablet Take 1 tablet (400 mg) by mouth 2 times daily  Patient taking differently: Take 400 mg by mouth daily  11/14/17   Cinda Cazares NP   tacrolimus (GENERIC EQUIVALENT) 1 MG capsule Take 1 capsule (1 mg) by mouth every 12 hours 11/9/17   Toñito Camejo MD   fludrocortisone (FLORINEF) 0.1 MG tablet Take 3 tablets (0.3 mg) by mouth daily 11/8/17   Ninfa Hernández MD   amLODIPine (NORVASC) 10 MG tablet Take 1 tablet (10 mg) by mouth daily  Patient  taking differently: Take 10 mg by mouth every morning  11/7/17   Ninfa Hernández MD   oxyCODONE IR (ROXICODONE) 10 MG tablet Take 1 tablet (10 mg) by mouth daily as needed for moderate to severe pain 11/6/17   Shay Kirkpatrick MD   CIALIS 5 MG tablet Take 5 mg by mouth as needed  6/7/17   Reported, Patient   furosemide (LASIX) 20 MG tablet Take 1 tablet (20 mg) by mouth 2 times daily 11/1/17   Cinda Cazares, NP   sodium bicarbonate 650 MG tablet Take 2 tablets (1,300 mg) by mouth 3 times daily 10/27/17   Sherry Albert MD   OMEPRAZOLE PO Take 20 mg by mouth every morning     Reported, Patient   GABAPENTIN PO Take 300 mg by mouth 3 times daily    Reported, Patient   insulin glargine (LANTUS) 100 UNIT/ML injection Inject 50 Units Subcutaneous every morning    Reported, Patient   linagliptin (TRADJENTA) 5 MG TABS tablet Take 5 mg by mouth every evening     Reported, Patient   loperamide (IMODIUM) 2 MG capsule Take 2 capsules (4 mg) by mouth 2 times daily  Patient taking differently: Take 2 mg by mouth 2 times daily  9/8/17   Felicia Tolliver, LAMIN CNP   cyclobenzaprine (FLEXERIL) 10 MG tablet Take 1 tablet (10 mg) by mouth 3 times daily as needed for muscle spasms 9/8/17   Felicia Tolliver APRN CNP       PHYSICAL EXAM:  NEURO/PSYCH: The patient is alert and oriented.  Appropriate.  Moves all extremities.   SKIN:  Warm and dry.  PULMONARY: non-labored breathing, not requiring supplemental oxygen  EXTREMITIES: right great toe healing nicely, right index finger healing nicely      ASSESSMENT:  Patient Active Problem List   Diagnosis     Carpal tunnel syndrome     Testicular hypofunction     Fibromyalgia     Sicca syndrome (H)     Medication refill- do not delete      Hepatocellular carcinoma (H)     Osteoarthritis     Rheumatoid arthritis (H)     Hyperkalemia     Liver transplant recipient (H)     Immunosuppressed status (H)     Neutropenic colitis (H)     S/P liver transplant (H)     Pancytopenia (H)      Gangrene of finger (H)     Ischemia of both lower extremities     Elevated serum creatinine     History of creation of ostomy     Peristomal skin complication     Painful periwound skin     Encounter for attention to ileostomy (H)         PLAN:  - BID right great toe dressing changes with adaptic and betadine.   - follow up with vascular APRN in  6 weeks for wound assessment.       Please do not hesitate to contact Dr. Beal's vascular surgery team with any questions.      Sabine KIMBLE, CNS  Division of Vascular Surgery  [facility]  Pager [phone]'    I personally examined the patient and agree with the findings above.  I discussed the patient with the resident / fellow and care team, and agree with the assessment and plan of care as documented in the note.  Vascular surgery staff    Toes are healing nicely.  Small area debrided back.  All healthy tissue  Should heal up without incident.  Follow up with Sabine in 6 weeks      Again, thank you for allowing me to participate in the care of your patient.      Sincerely,    Estefani Beal MD

## 2017-12-11 NOTE — NURSING NOTE
Chief Complaint   Patient presents with     RECHECK     Follow up right foot great toe        Vitals:    12/11/17 1417   BP: (!) 174/92   BP Location: Left arm   Pulse: 80   Resp: 17   SpO2: 98%       There is no height or weight on file to calculate BMI.               Leela Villasenor LPN

## 2017-12-11 NOTE — PROGRESS NOTES
VASCULAR SURGERY CLINIC ESTABLISHED PATIENT NOTE    HPI:    Camacho Bhagat is a 52 year old male with past medical history of CASTAÑEDA cirrhosis status post liver transplant in March, who was hospitalized a couple months ago for sepsis secondary to typhlitis on vasopressor support and developed gangrene of his right index finger and right great toe.  He returns to clinic today for wound check.        SUBJECTIVE:  Denies any right foot pain.  Denies any recent fever, chills or night sweats.    OBJECTIVE:  Vital signs:  BP (!) 174/92 (BP Location: Left arm)  Pulse 80  Resp 17  SpO2 98%      Prior to Admission medications    Medication Sig Start Date End Date Taking? Authorizing Provider   cholecalciferol (VITAMIN D3) 1000 UNIT tablet Take 1 tablet (1,000 Units) by mouth daily 12/6/17   Cinda Cazares NP   predniSONE (DELTASONE) 5 MG tablet Take 1 tablet (5 mg) by mouth daily 11/21/17   Toñito Camejo MD   mycophenolic acid (GENERIC EQUIVALENT) 360 MG EC tablet Take 2 tablets (720 mg) by mouth every 12 hours 11/20/17   Jovan Tran MD   patiromer (VELTASSA) 8.4 G packet Take 8.4 g by mouth daily 11/17/17   Ninfa Hernández MD   magnesium oxide (MAG-OX) 400 (241.3 MG) MG tablet Take 1 tablet (400 mg) by mouth 2 times daily  Patient taking differently: Take 400 mg by mouth daily  11/14/17   Cinda Cazares NP   tacrolimus (GENERIC EQUIVALENT) 1 MG capsule Take 1 capsule (1 mg) by mouth every 12 hours 11/9/17   Toñito Camejo MD   fludrocortisone (FLORINEF) 0.1 MG tablet Take 3 tablets (0.3 mg) by mouth daily 11/8/17   Ninfa Hernández MD   amLODIPine (NORVASC) 10 MG tablet Take 1 tablet (10 mg) by mouth daily  Patient taking differently: Take 10 mg by mouth every morning  11/7/17   Ninfa Hernández MD   oxyCODONE IR (ROXICODONE) 10 MG tablet Take 1 tablet (10 mg) by mouth daily as needed for moderate to severe pain 11/6/17   Shay Kirkpatrick MD   CIALIS 5 MG tablet Take 5 mg by mouth as needed   6/7/17   Reported, Patient   furosemide (LASIX) 20 MG tablet Take 1 tablet (20 mg) by mouth 2 times daily 11/1/17   Cinda Cazares, NP   sodium bicarbonate 650 MG tablet Take 2 tablets (1,300 mg) by mouth 3 times daily 10/27/17   Sherry Albert MD   OMEPRAZOLE PO Take 20 mg by mouth every morning     Reported, Patient   GABAPENTIN PO Take 300 mg by mouth 3 times daily    Reported, Patient   insulin glargine (LANTUS) 100 UNIT/ML injection Inject 50 Units Subcutaneous every morning    Reported, Patient   linagliptin (TRADJENTA) 5 MG TABS tablet Take 5 mg by mouth every evening     Reported, Patient   loperamide (IMODIUM) 2 MG capsule Take 2 capsules (4 mg) by mouth 2 times daily  Patient taking differently: Take 2 mg by mouth 2 times daily  9/8/17   Felicia Tolliver APRN CNP   cyclobenzaprine (FLEXERIL) 10 MG tablet Take 1 tablet (10 mg) by mouth 3 times daily as needed for muscle spasms 9/8/17   Felicia Tolliver APRN CNP       PHYSICAL EXAM:  NEURO/PSYCH: The patient is alert and oriented.  Appropriate.  Moves all extremities.   SKIN:  Warm and dry.  PULMONARY: non-labored breathing, not requiring supplemental oxygen  EXTREMITIES: right great toe healing nicely, right index finger healing nicely      ASSESSMENT:  Patient Active Problem List   Diagnosis     Carpal tunnel syndrome     Testicular hypofunction     Fibromyalgia     Sicca syndrome (H)     Medication refill- do not delete      Hepatocellular carcinoma (H)     Osteoarthritis     Rheumatoid arthritis (H)     Hyperkalemia     Liver transplant recipient (H)     Immunosuppressed status (H)     Neutropenic colitis (H)     S/P liver transplant (H)     Pancytopenia (H)     Gangrene of finger (H)     Ischemia of both lower extremities     Elevated serum creatinine     History of creation of ostomy     Peristomal skin complication     Painful periwound skin     Encounter for attention to ileostomy (H)         PLAN:  - BID right great toe dressing changes  with adaptic and betadine.   - follow up with vascular APRN in  6 weeks for wound assessment.       Please do not hesitate to contact Dr. Beal's vascular surgery team with any questions.      Sabine KIMBLE, CNS  Division of Vascular Surgery  [facility]  Pager [phone]'    I personally examined the patient and agree with the findings above.  I discussed the patient with the resident / fellow and care team, and agree with the assessment and plan of care as documented in the note.  Vascular surgery staff    Toes are healing nicely.  Small area debrided back.  All healthy tissue  Should heal up without incident.  Follow up with Sabine in 6 weeks  Estefani Beal MD

## 2017-12-12 ENCOUNTER — TELEPHONE (OUTPATIENT)
Dept: TRANSPLANT | Facility: CLINIC | Age: 52
End: 2017-12-12

## 2017-12-12 DIAGNOSIS — Z94.4 LIVER REPLACED BY TRANSPLANT (H): Primary | ICD-10-CM

## 2017-12-12 NOTE — TELEPHONE ENCOUNTER
Prior Authorization Approval    Authorization Effective Date: 12/11/2017  Authorization Expiration Date: 1/11/2018  Medication: oxycodone -APPROVED  Approved Dose/Quantity:   Reference #:     Insurance Company: [employer] [place] - Phone [phone] Fax [phone]  Expected CoPay: $0.00     CoPay Card Available:      Foundation Assistance Needed:    Which Pharmacy is filling the prescription (Not needed for infusion/clinic administered): [facility] [address] [place] [address] [address]  Pharmacy Notified: Yes  Patient Notified: Yes

## 2017-12-12 NOTE — TELEPHONE ENCOUNTER
Please call Camacho about lab frequency, he can decrease his lab frequency to once a week now.  Check with him re: if doing okay, any changes?  His liver tests are good, and his creatinine and potassium are normal.

## 2017-12-13 ENCOUNTER — OFFICE VISIT (OUTPATIENT)
Dept: SURGERY | Facility: CLINIC | Age: 52
End: 2017-12-13
Payer: COMMERCIAL

## 2017-12-13 ENCOUNTER — APPOINTMENT (OUTPATIENT)
Dept: SURGERY | Facility: CLINIC | Age: 52
End: 2017-12-13
Payer: COMMERCIAL

## 2017-12-13 ENCOUNTER — ALLIED HEALTH/NURSE VISIT (OUTPATIENT)
Dept: SURGERY | Facility: CLINIC | Age: 52
End: 2017-12-13
Payer: COMMERCIAL

## 2017-12-13 ENCOUNTER — TELEPHONE (OUTPATIENT)
Dept: TRANSPLANT | Facility: CLINIC | Age: 52
End: 2017-12-13

## 2017-12-13 ENCOUNTER — TELEPHONE (OUTPATIENT)
Dept: PHARMACY | Facility: CLINIC | Age: 52
End: 2017-12-13

## 2017-12-13 ENCOUNTER — ANESTHESIA EVENT (OUTPATIENT)
Dept: SURGERY | Facility: CLINIC | Age: 52
End: 2017-12-13

## 2017-12-13 VITALS
BODY MASS INDEX: 25.73 KG/M2 | DIASTOLIC BLOOD PRESSURE: 87 MMHG | HEIGHT: 72 IN | WEIGHT: 190 LBS | OXYGEN SATURATION: 99 % | HEART RATE: 88 BPM | TEMPERATURE: 98.8 F | SYSTOLIC BLOOD PRESSURE: 170 MMHG

## 2017-12-13 VITALS
HEIGHT: 72 IN | OXYGEN SATURATION: 99 % | HEART RATE: 88 BPM | BODY MASS INDEX: 25.74 KG/M2 | WEIGHT: 190.04 LBS | TEMPERATURE: 98.8 F

## 2017-12-13 DIAGNOSIS — K63.1 COLON PERFORATION (H): ICD-10-CM

## 2017-12-13 DIAGNOSIS — Z01.818 PRE-OP EXAMINATION: Primary | ICD-10-CM

## 2017-12-13 DIAGNOSIS — Z43.2 ATTENTION TO ILEOSTOMY (H): Primary | ICD-10-CM

## 2017-12-13 DIAGNOSIS — Z94.4 LIVER REPLACED BY TRANSPLANT (H): ICD-10-CM

## 2017-12-13 DIAGNOSIS — Z01.818 PREOP EXAMINATION: Primary | ICD-10-CM

## 2017-12-13 ASSESSMENT — LIFESTYLE VARIABLES: TOBACCO_USE: 1

## 2017-12-13 ASSESSMENT — PAIN SCALES - GENERAL: PAINLEVEL: NO PAIN (0)

## 2017-12-13 NOTE — PATIENT INSTRUCTIONS
Preparing for Your Surgery      Name:  Camacho Bhagat   MRN:  [id number]   :  1965   Today's Date:  2017     Arriving for surgery:  Surgery date:  18  Arrival time:  5:30 am    Please come to:    [facility], 3rd floor, [address]    [address] [place]  [address]    -   parking is available in front of the hospital from 5:15 am to 8:00 pm    -  Stop at the Information Desk in the lobby    -   Inform the information person that you are here for surgery. An escort to 3c will be provided. If you would not like an escort, please proceed to 3C on the 3rd floor. [phone]     What can I eat or drink?  -  You may have solid food or milk products until 8 hours prior to your surgery (until 11:30 pm).  -  You may have water, gatorade, apple juice or 7up/Sprite until 2 hours prior to your surgery (until 5:30 am).    Which medicines can I take?        -  Hold vitamin D for 7 days before surgery.        -  Avoid Cialis for 24 hours before surgery.    -  Do NOT take these medications in the morning, the day of surgery:  Furosemide.    -  Take these medications the day of surgery: 40 units Lantus insulin and all other regular morning medications.    How do I prepare myself?  -  Take two showers: one the night before surgery; and one the morning of surgery.         Use Scrubcare or Hibiclens to wash from neck down.  You may use your own shampoo and conditioner. No other hair products.   -  Do NOT use lotion, powder, deodorant, or antiperspirant the day of your surgery.  -  Do NOT wear any makeup, fingernail polish or jewelry.  -  Begin using Incentive Spirometer 1 week prior to surgery.  Use 4 times per day, up to 5 breaths each time.  Bring Incentive Spirometer to hospital.  -  Bring your ID and insurance card.    Questions or Concerns:  If you have questions or concerns, please call the  Preoperative Assessment Center, Monday-Friday 7AM-7PM:  [phone].

## 2017-12-13 NOTE — ANESTHESIA PREPROCEDURE EVALUATION
Anesthesia Evaluation     . Pt has had prior anesthetic. Type: General    No history of anesthetic complications          ROS/MED HX    ENT/Pulmonary:     (+)RONNIE risk factors (Resolved after 200 lbs over last year.  ) tobacco use (remote), , . .    Neurologic:  - neg neurologic ROS     Cardiovascular:     (+) hypertension----. : . . . :. . Previous cardiac testing Echodate:results:date: results:ECG reviewed date: results: date: results:          METS/Exercise Tolerance: Comment: Walks dog 1-2 miles per day.  >4 METS   Hematologic:     (+) History of blood clots (s/p IVC filter in 2001) pt is not anticoagulated, Anemia, History of Transfusion no previous transfusion reaction -      Musculoskeletal: Comment: Gangrene right index finger, s/p debridement and flap 11/10/2017    Dislocated right wrist that required hardware ~2012.            GI/Hepatic: Comment: H/O stage IV liver cancer r/t CASTAÑEDA with subsequent cirrhosis s/p transplant 3/4/2017.  Received chemotherapy and radiation prior to transplant.     (+) GERD Asymptomatic on medication,       Renal/Genitourinary: Comment: Resolving CKI    (+) chronic renal disease,       Endo:     (+) type I DM, Last HgA1c: 9.4 date: 2/2017 Using insulin Normal glucose range: BG  in mornings not previously admitted for DM/DKA .      Psychiatric:     (+) psychiatric history depression      Infectious Disease:  - neg infectious disease ROS       Malignancy:   (+) Malignancy History of Other  Other CA Remission status post Chemo and Radiation         Other:    (+) No chance of pregnancy C-spine cleared: N/A, no H/O Chronic Pain,no other significant disability                    Physical Exam  Normal systems: pulmonary and dental    Airway   Mallampati: I  TM distance: >3 FB  Neck ROM: full    Dental     Cardiovascular   Rhythm and rate: regular and normal      Pulmonary     Other findings: Lab Results      Component                Value               Date                       WBC                      4.3                 12/11/2017            Lab Results      Component                Value               Date                      RBC                      2.83                12/11/2017            Lab Results      Component                Value               Date                      HGB                      8.6                 12/11/2017            Lab Results      Component                Value               Date                      HCT                      27.2                12/11/2017            Lab Results      Component                Value               Date                      MCV                      96                  12/11/2017            Lab Results      Component                Value               Date                      MCH                      30.4                12/11/2017            Lab Results      Component                Value               Date                      MCHC                     31.6                12/11/2017            Lab Results      Component                Value               Date                      RDW                      14.7                12/11/2017            Lab Results      Component                Value               Date                      PLT                      64                  12/11/2017              Last Basic Metabolic Panel:  Lab Results      Component                Value               Date                      NA                       140                 12/11/2017             Lab Results      Component                Value               Date                      POTASSIUM                4.6                 12/11/2017            Lab Results      Component                Value               Date                      CHLORIDE                 106                 12/11/2017            Lab Results      Component                Value               Date                      JACOB                      8.2                 12/11/2017             Lab Results      Component                Value               Date                      CO2                      27                  12/11/2017            Lab Results      Component                Value               Date                      BUN                      22                  12/11/2017            Lab Results      Component                Value               Date                      CR                       1.23                12/11/2017            Lab Results      Component                Value               Date                      GLC                      164                 12/11/2017              Procedures  ECG SR 86 bpm (10/24/2017)  Echocardiogram 7/4/2017    Interpretation Summary  Global and regional left ventricular function is normal with an EF of 60-65%.  Mild right ventricular dilation is present.  Global right ventricular function is normal.  Right ventricular systolic pressure is 36mmHg above the right atrial pressure.  No pericardial effusion is present.     This study was compared with the study from 7/6/2017, no significant changes  are found.                PAC Discussion and Assessment    ASA Classification: 3  Case is suitable for: [place]  Anesthetic techniques and relevant risks discussed: GA  Invasive monitoring and risk discussed:   Types:   Possibility and Risk of blood transfusion discussed: Yes  NPO instructions given:   Additional anesthetic preparation and risks discussed:   Needs early admission to pre-op area:   Other:     PAC Resident/NP Anesthesia Assessment:  Camacho Bhagat is a 52 year old male scheduled for Exploratory Laparotomy, Takedown Of End Ileostomy And Colorectal Anastomosis on 1/5/2018 with Dr. Dinh at [facility] under general anesthesia. This past July, Mr. Bhagat, became septic from neutropenic enterocolitis and suffered colon perforation with subsequent ileostomy.  PAC referral for risk assessment  and optimization of anesthesia with comorbid conditions of:  HTN; h/o DVT, s/p permanent IVC filter; h/o stage IV liver cancer r/t CASTAÑEDA with subsequent cirrhosis, s/p liver transplant 3/4/2017 (received chemotherapy and radiation prior to transplant); h/o RONNIE; GERD: IDDM; fibromyalgia; rheumatoid arthritis; depression; h/o gangrene of right index finger, s/p debridement and surgical flap (11/10/2017); and h/o blood transfusion.  Mr. Bhagat was hospitalized for hyperkalemia this past October treated with Lasix, Veltassa and Florinef.  The hyperkalemia was thought to be 2/2 tacrolimus causing hypoaldosteronism.     Mr. Bhagat presents to PAC and denies any cardiopulmonary history or symptoms.  He is able to walk his dog 1-2 miles per day without problems.  He takes oxycodone 10 mg once a day. He would like to proceed with the above scheduled take down.     He has the following specific operative considerations:     1.  Cardiology - METS>4.  EF 60-65% on echo 7/2017.  RCRI : Diabetes Mellitus (on Insulin) and intraperitoneal surgery.  6.6% % risk of major adverse cardiac event.        - HTN, take CCB DOS  2.  Pulmonary - remote smoking history       - H/O RONNIE >>> resolved with 200 # weight loss  3.  Hematology - H/O DVT 2001 while hospitalized with pneumonia, s/p permanent IVC filter       - VTE risk:  4.5% >>> Increased VTE risk: Recommend use of mechanical/chemical VTE prophylaxis in the patricia- and postoperative periods.         - h/o anemia, iron replacement therapy.  Hgb 8.6       - H/O multiple transfusions with last transfusion October 2017.  Will order T & S for three days before surgery.   4.  GI - Risk of PONV score = 2.  If > 2, anti-emetic intervention recommended.       - GERD, take PPI DOS       - H/O stage IV liver cancer r/t CASTAÑEDA with subsequent cirrhosis, s/p liver transplant 3/4/2017.  Received chemotherapy and radiation prior to transplant. Take immunosuppression DOS.        - Sepsis due to neutropenic  colitis in July 2017.  Suffered colon perforation>>>s/p ileostomy.  Ileostomy take down scheduled as above.   5.  Renal - CKI thought to be 2/2 tacrolimus. Cr 1.23  And GFR 62 (12/11/17)       - Hospitalized 10/24-10/27/17 for hyperkalemia treated with lasix, veltassa and florinef.  Records indicated elevated potassium thought to be 2/2 tacrolimus causing hypoaldosterone.  BMP will be checked DOS.  6.  Endocrine -  Diabetes, insulin dependent: Hold morning oral hypoglycemic medications DOS. Take 80% of last scheduled dose of long acting insulin prior to procedure.  Recommend close monitoring of the patient's blood glucose levels throughout the perioperative period and treat per [place] guidelines.  A1C from 2/2017 9.4.  Refused recheck of A1C today.   7.  Musculoskeletal -  Rheumatoid arthritis. Takes oxycodone 10 mg daily.  MSO4 equivalent 15 mg/24 hours.  Takes cyclobenzaprine as needed.         - H/O ischemic right index finger with gangrene, s/p debridement and flap 11/10/2017.  8.  Neuro - fibromyalgia, take gabapentin DOS    - Anesthesia considerations:  Refer to PAC assessment in anesthesia records.  Patient has undergone multiple surgical procedures this past year without any issues.  07/25/17; 1228; Mask Ventilation:  (bipap); Ease of Intubation: Easy; Airway Size: 7.5;  Cuffed;  Oral;  Blade Type: C-Mac;  Blade Size: 4;  Place by: sal;  Insertion Attempts: 1;  Secured at (cm)to lip: 23 cm;  Breath Sounds: Equal, clear and bilateral;  End Tidal CO2: Present (by EZ cap );  Dentition: Intact, Unchanged;  Grade View of Cords: 1 (open and clear);  Airway Adjuncts:  C-Mac    - Takes prednisone 5 mg daily.  Consider stress dose steroids.     Arrival time, NPO, shower and medication instructions provided by nursing staff today.  Preparing For Your Surgery handout given.  Patient was discussed with Dr Acosta. I spent 20 minutes face to face with patient, assessing, examining, and educating.        Reviewed and  Signed by PAC Mid-Level Provider/Resident  Mid-Level Provider/Resident: Oliva KIMBLE CNP  Date: 12/13/2017  Time: 13:34    Attending Anesthesiologist Anesthesia Assessment:  52 year old for ex lap, takedown of end ileostomy in the setting of acute colitis requiring diverting ileostomy in this liver transplant patient (HCC in CASTAÑEDA cirrhosis). Chart reviewed, patient seen and evaluated; agree with above assessment. Patient was very ill with his colitis, in the hospital for a month, but has completely recovered. He is diabetic, the last A1c we have ws 9.4 prior to his transplant, but patient refused to allow us to do an A1c today (became very angry when I suggested it, was even angry that someone had done the other A1c without asking his permission - funny response). Typical BG in the 110-120 range, but occasionally elevated. Tacrolimus related hypoaldosteronism, on Florinef and prednisone. Creatinine stable at 1.2.    Patient is appropriate for the planned procedure without further workup or medical management change. The final anesthesia plan will be determined by the physician anesthesiologist caring for the patient on the day of surgery.    Reviewed and Signed by PAC Anesthesiologist  Anesthesiologist: joe  Date: 12/13/2017  Time:   Pass/Fail: Pass  Disposition:     PAC Pharmacist Assessment:        Pharmacist:   Date:   Time:                           .

## 2017-12-13 NOTE — TELEPHONE ENCOUNTER
----- Message from Ninfa Hernández MD sent at 11/29/2017 11:28 AM CST -----  Regarding: RE: DIPAK concerns  Sounds fine, thanks Sabine!  Maybe Jose can talk him into it this Friday when he sees him.   Jovanna can also review it with him next Wednesday.    Ninfa  ----- Message -----     From: Sabine Sanchez [facility]     Sent: 11/22/2017   2:27 PM       To: Ninfa Hernández MD  Subject: FW: DIPAK concerns                                 Ninfa:  Please see message from Jose below..    You wanted me to hold off while hgb in the 8's. I have already told patient with plan to evaluate hgb again 12/12. Ok to stick with this plan?    Thanks.  Sabine  ----- Message -----     From: Toñito Camejo MD     Sent: 11/17/2017   7:12 PM       To: Sabine Sanchez [facility]  Subject: RE: DIPAK concerns                                 He needs the DIPAK.  His concerns are only theoretical.  ----- Message -----     From: Sabine Sanchez [facility]     Sent: 11/17/2017   9:03 AM       To: Toñito Camejo MD, Ninfa Hernández MD, #  Subject: FW: DIPAK concerns                                 Following up on my message below...when you have a moment, please let me know how you would like me to proceed.    Thank you,  Sabine  ----- Message -----     From: Sabine Sanchez [facility]     Sent: 11/15/2017   2:15 PM       To: Toñito Camejo MD, Ninfa Hernández MD, #  Subject: DIPAK concerns                                     Jovanna Monge Sarah:    I was referred Mr Bhagat, and will initiate aranesp for hgb<9. He has several concerns about using DIPAK. In particular, h/o clots and placement of IVC filter, and h/o hepatocelullar carcinoma.     Please let me know:    (1) If you would like me to proceed with DIPAK initiation.   (2) Confirm hgb target: standard 9-10, or other  (3) Dose would be aranesp 60mcg every 2 weeks per protocol, or indicate other    Thanks!  Sabine

## 2017-12-13 NOTE — PROGRESS NOTES
Preoperative Assessment Center medication history for December 13, 2017 is complete.    See Epic admission navigator for allergy information, pharmacy and prior to admission medications.    Operating room staff will still need to confirm medications and last dose information on day of surgery.     Medication history interview sources:  patient    Changes made to PTA medication list (reason)  Added: none  Deleted: none  Changed: immodium to PRN - pt regulates on his own based on output  parotimer is in afternoon    Additional medication history information (including reliability of information, actions taken by pharmacist):None    -- No recent (within 30 days) course of antibiotics  -- No recent (within 30 days) course of steroids  -- No recent (within 30 days) chronic daily medications stopped     Prior to Admission medications    Medication Sig Last Dose Taking? Auth Provider   cholecalciferol (VITAMIN D3) 1000 UNIT tablet Take 1 tablet (1,000 Units) by mouth daily Taking Yes Cinda Cazares NP   predniSONE (DELTASONE) 5 MG tablet Take 1 tablet (5 mg) by mouth daily Taking Yes Toñito Camejo MD   mycophenolic acid (GENERIC EQUIVALENT) 360 MG EC tablet Take 2 tablets (720 mg) by mouth every 12 hours Taking Yes Jovan Tran MD   patiromer (VELTASSA) 8.4 G packet Take 8.4 g by mouth daily Taking Yes Ninfa Hernández MD   magnesium oxide (MAG-OX) 400 (241.3 MG) MG tablet Take 1 tablet (400 mg) by mouth 2 times daily  Patient taking differently: Take 400 mg by mouth daily  Taking Yes Cinda Cazares NP   tacrolimus (GENERIC EQUIVALENT) 1 MG capsule Take 1 capsule (1 mg) by mouth every 12 hours Taking Yes Toñito Camejo MD   fludrocortisone (FLORINEF) 0.1 MG tablet Take 3 tablets (0.3 mg) by mouth daily Taking Yes Ninfa Hernández MD   amLODIPine (NORVASC) 10 MG tablet Take 1 tablet (10 mg) by mouth daily  Patient taking differently: Take 10 mg by mouth every morning  Taking Yes Ninfa Hernández  MD Yadi   oxyCODONE IR (ROXICODONE) 10 MG tablet Take 1 tablet (10 mg) by mouth daily as needed for moderate to severe pain Taking Yes Shay Kirkpatrick MD   CIALIS 5 MG tablet Take 5 mg by mouth as needed  Taking Yes Reported, Patient   furosemide (LASIX) 20 MG tablet Take 1 tablet (20 mg) by mouth 2 times daily Taking Yes Debora, Cinda Jiménez, NP   sodium bicarbonate 650 MG tablet Take 2 tablets (1,300 mg) by mouth 3 times daily Taking Yes Sherry Albert MD   OMEPRAZOLE PO Take 20 mg by mouth every morning  Taking Yes Reported, Patient   GABAPENTIN PO Take 300 mg by mouth 3 times daily Taking Yes Reported, Patient   insulin glargine (LANTUS) 100 UNIT/ML injection Inject 50 Units Subcutaneous every morning Taking Yes Reported, Patient   linagliptin (TRADJENTA) 5 MG TABS tablet Take 5 mg by mouth every evening  Taking Yes Reported, Patient   loperamide (IMODIUM) 2 MG capsule Take 2 capsules (4 mg) by mouth 2 times daily  Patient taking differently: Take 2 mg by mouth 2 times daily as needed  Taking Yes Felicia Tolliver APRN CNP   cyclobenzaprine (FLEXERIL) 10 MG tablet Take 1 tablet (10 mg) by mouth 3 times daily as needed for muscle spasms Taking Yes Felicia Tolliver APRN CNP         Medication history completed by: Corky Mckinney, [facility],

## 2017-12-13 NOTE — NURSING NOTE
Chief Complaint   Patient presents with     Clinic Care Coordination - Follow-up     Discuss takedown surgery.        Vitals:    12/13/17 1013   BP: 170/87   BP Location: Left arm   Patient Position: Chair   Cuff Size: Adult Large   Pulse: 88   Temp: 98.8  F (37.1  C)   TempSrc: Oral   SpO2: 99%   Weight: 190 lb   Height: 6'       Body mass index is 25.77 kg/(m^2).    Blayne HENRY LPN

## 2017-12-13 NOTE — H&P
Pre-Operative H & P     CC:  Preoperative exam to assess for increased cardiopulmonary risk while undergoing surgery and anesthesia.    Date of Encounter: 12/13/2017  Primary Care Physician:  Shay Kirkpatrick  Camacho Bhagat is a 52 year old male who presents for pre-operative H & P in preparation for  Exploratory Laparotomy, Takedown Of End Ileostomy And Colorectal Anastomosis on 1/5/2018 with Dr. Dinh at [facility] under general anesthesia. This past July, Mr. Bhagat, became septic from neutropenic enterocolitis and suffered colon perforation with subsequent ileostomy.  PAC referral for risk assessment and optimization of anesthesia with comorbid conditions of:  HTN; h/o DVT, s/p permanent IVC filter; h/o stage IV liver cancer r/t CASTAÑEDA with subsequent cirrhosis, s/p liver transplant 3/4/2017 (received chemotherapy and radiation prior to transplant); h/o RONNIE; GERD: IDDM; fibromyalgia; rheumatoid arthritis; depression; h/o gangrene of right index finger, s/p debridement and surgical flap (11/10/2017); and h/o blood transfusion.  Mr. Bhagat was hospitalized for hyperkalemia this past October treated with Lasix, Veltassa and Florinef.  The hyperkalemia was thought to be 2/2 tacrolimus causing hypoaldosteronism.     Mr. Bhagat presents to PAC and denies any cardiopulmonary history or symptoms.  He is able to walk his dog 1-2 miles per day without problems.  He takes oxycodone 10 mg once a day. He would like to proceed with the above scheduled take down.     History is obtained from the patient and electronic health record.     Past Medical History  Past Medical History:   Diagnosis Date     Depressive disorder, not elsewhere classified      Diabetes type 2, controlled (H) 11/10/2016     Esophageal reflux      Fibromyalgia 1/2009    dx with Dr Benitez( Rheum)     Gangrene of finger (H) 8/25/2017     H/O deep venous thrombosis 11/2001    Permanent IVC filter in place.      H/O CASTAÑEDA (nonalcoholic steatohepatitis)      H/O Pneumonia, organism unspecified(486) 10/2001    Included ARDS, sepsis, and  acute renal failure; hospitalized, required tracheostomy placement.     H/O: HTN (hypertension) 11/2001    No longer prescribed antihypertensive medication.       History of hepatocellular carcinoma      History of liver transplant (H)      History of obstructive sleep apnea     No longer wears CPAP since losing approximately 200 pounds with his liver transplant and its complications.       HLD (hyperlipidemia)      Ischemia of both lower extremities 8/25/2017    Distal ischemia due to shock/high pressor requirements     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     Osteoarthritis      Presence of PERMANENT IVC filter      Rheumatoid arthritis(714.0)        Past Surgical History  Past Surgical History:   Procedure Laterality Date     BENCH LIVER N/A 3/4/2017    Procedure: BENCH LIVER;  Surgeon: Jovna Tran MD;  Location: [facility] TOTAL KNEE ARTHROPLASTY  2008    Right knee arthroscopy     CHOLECYSTECTOMY       COLONOSCOPY N/A 7/21/2017    Procedure: COMBINED COLONOSCOPY, SINGLE OR MULTIPLE BIOPSY/POLYPECTOMY BY BIOPSY;  Colonoscopy;  Surgeon: Izaiah Motnes MD;  Location:  GI     ENT SURGERY      Repair of deviated septum     ESOPHAGOSCOPY, GASTROSCOPY, DUODENOSCOPY (EGD), COMBINED N/A 8/4/2016    Procedure: COMBINED ESOPHAGOSCOPY, GASTROSCOPY, DUODENOSCOPY (EGD), BIOPSY SINGLE OR MULTIPLE;  Surgeon: Trent Pederson MD;  Location:  GI     ESOPHAGOSCOPY, GASTROSCOPY, DUODENOSCOPY (EGD), COMBINED N/A 8/27/2017    Procedure: COMBINED ESOPHAGOSCOPY, GASTROSCOPY, DUODENOSCOPY (EGD);;  Surgeon: Los Wynn MD;  Location:  GI     LAPAROTOMY EXPLORATORY N/A 7/4/2017    Procedure: LAPAROTOMY EXPLORATORY;  Exploratory Laparotomy, washout;  Surgeon: Tip Zhang MD;  Location: [facility] OR     LAPAROTOMY EXPLORATORY N/A 7/5/2017    Procedure: LAPAROTOMY EXPLORATORY;   Exploratory Laparotomy, Washout with closure.;  Surgeon: Tip Zhang MD;  Location: [facility] OR     LAPAROTOMY EXPLORATORY N/A 2017    Procedure: LAPAROTOMY EXPLORATORY;  Exploratory Laparotomy, Right angelique-colectomy, end ileostomy, mucosal fistula, partial omentectomy;  Surgeon: Sara Dinh MD;  Location: [facility] OR     ORTHOPEDIC SURGERY Right     Repair of dislocated wrist.       RELEASE TRIGGER FINGER Right 11/10/2017    Procedure: RELEASE TRIGGER FINGER;  Debride, V-Y Flap Right Index Finger;  Surgeon: Momo Noonan MD;  Location: [facility] OR     TRACHEOSTOMY  2001    During hospitalization for pneumonia.       TRANSPLANT LIVER RECIPIENT  DONOR N/A 3/4/2017    Procedure: TRANSPLANT LIVER RECIPIENT  DONOR;  Surgeon: Jovan Tran MD;  Location: [facility] OR       Hx of Blood transfusions/reactions: yes without reaction     Hx of abnormal bleeding or anti-platelet use: denies    Menstrual history: No LMP for male patient.: na    Steroid use in the last year: Prednisone 5mg daily    Personal or FH with difficulty with Anesthesia:  denies    Prior to Admission Medications  Current Outpatient Prescriptions   Medication Sig Dispense Refill     cholecalciferol (VITAMIN D3) 1000 UNIT tablet Take 1 tablet (1,000 Units) by mouth daily 100 tablet 3     predniSONE (DELTASONE) 5 MG tablet Take 1 tablet (5 mg) by mouth daily 30 tablet 11     mycophenolic acid (GENERIC EQUIVALENT) 360 MG EC tablet Take 2 tablets (720 mg) by mouth every 12 hours 120 tablet 3     patiromer (VELTASSA) 8.4 G packet Take 8.4 g by mouth daily 30 each 3     magnesium oxide (MAG-OX) 400 (241.3 MG) MG tablet Take 1 tablet (400 mg) by mouth 2 times daily (Patient taking differently: Take 400 mg by mouth daily ) 180 tablet 3     tacrolimus (GENERIC EQUIVALENT) 1 MG capsule Take 1 capsule (1 mg) by mouth every 12 hours 60 capsule 11     fludrocortisone (FLORINEF) 0.1 MG tablet Take 3 tablets (0.3 mg) by mouth daily 90  tablet 3     amLODIPine (NORVASC) 10 MG tablet Take 1 tablet (10 mg) by mouth daily (Patient taking differently: Take 10 mg by mouth every morning ) 90 tablet 3     oxyCODONE IR (ROXICODONE) 10 MG tablet Take 1 tablet (10 mg) by mouth daily as needed for moderate to severe pain 30 tablet 0     CIALIS 5 MG tablet Take 5 mg by mouth as needed   1     furosemide (LASIX) 20 MG tablet Take 1 tablet (20 mg) by mouth 2 times daily 60 tablet 3     sodium bicarbonate 650 MG tablet Take 2 tablets (1,300 mg) by mouth 3 times daily 180 tablet 3     OMEPRAZOLE PO Take 20 mg by mouth every morning        GABAPENTIN PO Take 300 mg by mouth 3 times daily       insulin glargine (LANTUS) 100 UNIT/ML injection Inject 50 Units Subcutaneous every morning       linagliptin (TRADJENTA) 5 MG TABS tablet Take 5 mg by mouth every evening        loperamide (IMODIUM) 2 MG capsule Take 2 capsules (4 mg) by mouth 2 times daily (Patient taking differently: Take 2 mg by mouth 2 times daily as needed ) 120 capsule 3     cyclobenzaprine (FLEXERIL) 10 MG tablet Take 1 tablet (10 mg) by mouth 3 times daily as needed for muscle spasms 90 tablet 0       Allergies  Allergies   Allergen Reactions     Erythromycin GI Disturbance     Vioxx      Nausea, vomiting       Social History  Social History     Social History     Marital status:      Spouse name: N/A     Number of children: 1     Years of education: N/A     Occupational History            Social History Main Topics     Smoking status: Former Smoker     Packs/day: 0.75     Years: 2.00     Quit date: 1988     Smokeless tobacco: Former User     Types: Chew     Quit date: 10/8/2015     Alcohol use No      Comment: No alcohol since liver transplant.       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     Cervical Cancer Maternal Grandmother      CEREBROVASCULAR DISEASE Maternal Grandfather      Prostate Cancer Paternal Grandfather      Colon Cancer No family hx of      Hyperlipidemia No family hx of      Coronary Artery Disease No family hx of      Breast Cancer No family hx of      ROS/MED HX    ENT/Pulmonary:     (+)RONNIE risk factors (Resolved after 200 lbs over last year.  ) tobacco use (remote), , . .    Neurologic:  - neg neurologic ROS     Cardiovascular:     (+) hypertension----. : . . . :. . Previous cardiac testing Echodate:results:date: results:ECG reviewed date: results: date: results:          METS/Exercise Tolerance: Comment: Walks dog 1-2 miles per day.  >4 METS   Hematologic:     (+) History of blood clots (s/p IVC filter in 2001) pt is not anticoagulated, Anemia, History of Transfusion no previous transfusion reaction -      Musculoskeletal: Comment: Gangrene right index finger, s/p debridement and flap 11/10/2017    Dislocated right wrist that required hardware ~2012.            GI/Hepatic: Comment: H/O stage IV liver cancer r/t CASTAÑEDA with subsequent cirrhosis s/p transplant 3/4/2017.  Received chemotherapy and radiation prior to transplant.     (+) GERD Asymptomatic on medication,       Renal/Genitourinary: Comment: Resolving CKI    (+) chronic renal disease,       Endo:     (+) type I DM, Last HgA1c: 9.4 date: 2/2017 Using insulin Normal glucose range: BG  in mornings not previously admitted for DM/DKA .      Psychiatric:     (+) psychiatric history depression      Infectious Disease:  - neg infectious disease ROS       Malignancy:   (+) Malignancy History of Other  Other CA Remission status post Chemo and Radiation         Other:    (+) No chance of pregnancy C-spine cleared: N/A, no H/O Chronic  "Pain,no other significant disability          Pulse 88  Temp 98.8  F (37.1  C) (Oral)  Ht 1.829 m (6')  Wt 86.2 kg (190 lb 0.6 oz)  SpO2 99%  BMI 25.77 kg/m2    Temp: 98.8  F (37.1  C) Temp src: Oral   Pulse: 88     SpO2: 99 %         190 lbs .58 oz  6' 0\"   Body mass index is 25.77 kg/(m^2).       Physical Exam  Constitutional: Awake, alert, cooperative, no apparent distress, and appears stated age.  Eyes: Pupils equal, round and reactive to light, extra ocular muscles intact, sclera clear, conjunctiva normal.  HENT: Normocephalic, oral pharynx with moist mucus membranes, fair dentition. No goiter appreciated.   Respiratory: Clear to auscultation bilaterally, no crackles or wheezing.  Cardiovascular: Regular rate and rhythm, normal S1 and S2, and no murmur noted.  Carotids +2, no bruits. No edema. Palpable pulses to radial  DP and PT arteries.   GI: Normal bowel sounds, soft, non-distended, non-tender, no masses palpated, no hepatosplenomegaly.  Surgical scars: well healed midline scar.  RUQ with ostomy.    Lymph/Hematologic: No cervical lymphadenopathy and no supraclavicular lymphadenopathy.  Genitourinary:  na  Skin: Warm and dry.  No rashes at anticipated surgical site.  Right index finger with evidence of well healing wound without s/s of infection.  Musculoskeletal: Full ROM of neck. There is no redness, warmth, or swelling of the joints. Gross motor strength is normal.    Neurologic: Awake, alert, oriented to name, place and time. Cranial nerves II-XII are grossly intact. Gait is normal.   Neuropsychiatric: Calm, cooperative. Normal affect.     Labs: (personally reviewed)  Lab Results   Component Value Date    WBC 4.3 12/11/2017     Lab Results   Component Value Date    RBC 2.83 12/11/2017     Lab Results   Component Value Date    HGB 8.6 12/11/2017     Lab Results   Component Value Date    HCT 27.2 12/11/2017     Lab Results   Component Value Date    MCV 96 12/11/2017     Lab Results   Component Value " Date    MCH 30.4 12/11/2017     Lab Results   Component Value Date    MCHC 31.6 12/11/2017     Lab Results   Component Value Date    RDW 14.7 12/11/2017     Lab Results   Component Value Date    PLT 64 12/11/2017       Last Basic Metabolic Panel:  Lab Results   Component Value Date     12/11/2017      Lab Results   Component Value Date    POTASSIUM 4.6 12/11/2017     Lab Results   Component Value Date    CHLORIDE 106 12/11/2017     Lab Results   Component Value Date    JACOB 8.2 12/11/2017     Lab Results   Component Value Date    CO2 27 12/11/2017     Lab Results   Component Value Date    BUN 22 12/11/2017     Lab Results   Component Value Date    CR 1.23 12/11/2017     Lab Results   Component Value Date     12/11/2017       Procedures    EKG: Personally reviewed but formal cardiology read pending: SR    Procedures  ECG SR 86 bpm (10/24/2017)  Echocardiogram 7/4/2017    Interpretation Summary  Global and regional left ventricular function is normal with an EF of 60-65%.  Mild right ventricular dilation is present.  Global right ventricular function is normal.  Right ventricular systolic pressure is 36mmHg above the right atrial pressure.  No pericardial effusion is present.     This study was compared with the study from 7/6/2017, no significant changes  are found.    ASSESSMENT and PLAN  Camacho Bhagat is a 52 year old male scheduled to undergo  Exploratory Laparotomy, Takedown Of End Ileostomy And Colorectal Anastomosis on 1/5/2018 with Dr. Dinh at [facility] under general anesthesia.    He has the following specific operative considerations:     1.  Cardiology - METS>4.  EF 60-65% on echo 7/2017.  RCRI : Diabetes Mellitus (on Insulin) and intraperitoneal surgery.  6.6% % risk of major adverse cardiac event.        - HTN, take CCB DOS  2.  Pulmonary - remote smoking history       - H/O RONNIE >>> resolved with 200 # weight loss  3.  Hematology - H/O DVT  2001 while hospitalized with pneumonia, s/p permanent IVC filter       - VTE risk:  4.5% >>> Increased VTE risk: Recommend use of mechanical/chemical VTE prophylaxis in the patricia- and postoperative periods.         - H/O anemia, iron replacement therapy.  H/O multiple transfusions with last transfusion October 2017.  Will order T & S for three days before surgery. Hgb 8.6.   4.  GI - Risk of PONV score = 2.  If > 2, anti-emetic intervention recommended.       - GERD, take PPI DOS       - H/O stage IV liver cancer r/t CASTAÑEDA with subsequent cirrhosis, s/p liver transplant 3/4/2017.  Received chemotherapy and radiation prior to transplant. Take immunosuppression DOS.        - Sepsis due to neutropenic colitis in July 2017.  Suffered colon perforation>>>s/p ileostomy.  Ileostomy take down scheduled as above.   5.  Renal - CKI thought to be 2/2 tacrolimus. Cr 1.23  & GFR 62 (12/11/17)       - Hospitalized 10/24-10/27/17 for hyperkalemia treated with lasix, veltassa and florinef.  Records indicated elevated potassium thought to be 2/2 tacrolimus causing hypoaldosterone.  BMP will be checked DOS.  6.  Endocrine -  Diabetes, insulin dependent: Hold morning oral hypoglycemic medications DOS. Take 80% of last scheduled dose of long acting insulin prior to procedure.  Recommend close monitoring of the patient's blood glucose levels throughout the perioperative period and treat per Success guidelines.  A1C from 2/2017 9.4.  Refused recheck of A1C today.   7.  Musculoskeletal -  Rheumatoid arthritis. Takes oxycodone 10 mg daily.  MSO4 equivalent 15 mg/24 hours.  Takes cyclobenzaprine as needed.         - H/O ischemic right index finger with gangrene, s/p debridement and flap 11/10/2017.  8.  Neuro - fibromyalgia, take gabapentin DOS    - Anesthesia considerations:  Refer to PAC assessment in anesthesia records.  Patient has undergone multiple surgical procedures this past year without any issues.  07/25/17; 1228; Mask  Ventilation:  (bipap); Ease of Intubation: Easy; Airway Size: 7.5;  Cuffed;  Oral;  Blade Type: C-Mac;  Blade Size: 4;  Place by: sal;  Insertion Attempts: 1;  Secured at (cm)to lip: 23 cm;  Breath Sounds: Equal, clear and bilateral;  End Tidal CO2: Present (by EZ cap );  Dentition: Intact, Unchanged;  Grade View of Cords: 1 (open and clear);  Airway Adjuncts:  C-Mac  - Takes prednisone 5 mg daily.  Consider stress dose steroids.     Arrival time, NPO, shower and medication instructions provided by nursing staff today.  Preparing For Your Surgery handout given.  Patient was discussed with Dr Acosta. I spent 20 minutes face to face with patient, assessing, examining, and educating.      LAMIN Smith CNP  Preoperative Assessment Center  [facility]  [facility]  Phone: [phone]  Fax: [phone]

## 2017-12-13 NOTE — TELEPHONE ENCOUNTER
Pt returned call, confirmed he rec'd message and has no questions.    [place] inquiring [place] [facility], also requested contact information for the faculty dental clinic in [facility]. Will send in MyChart per pt request.

## 2017-12-13 NOTE — MR AVS SNAPSHOT
After Visit Summary   12/13/2017    Camacho Bhagat    MRN: [id number]           Patient Information     Date Of Birth          1965        Visit Information        Provider Department      12/13/2017 10:30 AM Sara Dinh MD [facility] Colon and Rectal Surgery        Today's Diagnoses     Attention to ileostomy (H)    -  1       Follow-ups after your visit        Your next 10 appointments already scheduled     Jan 05, 2018   Procedure with Sara Dinh MD   [facility], [place], Same Day Surgery (--)    [address] [facility] [id number] [phone]            Jan 16, 2018 11:00 AM CST   (Arrive by 10:45 AM)   Return Vascular Visit with LAMIN Lopez [facility] ([facility])    [address] [place] [id number] [phone]            Jan 31, 2018  1:00 PM CST   (Arrive by 12:30 PM)   Return Visit with Cinda Cazares NP   [facility] Nephrology ([facility])    [address] [place] [id number] [phone]            Mar 02, 2018 10:45 AM CST   (Arrive by 10:30 AM)   Return Liver Transplant with Toñito Camejo MD   [facility] Hepatology ([facility])    [address] [place] [id number] [phone]              Who to contact     Please call your clinic at [phone] to:    Ask questions about your health    Make or cancel appointments    Discuss your medicines    Learn about your test results    Speak to your doctor   If you have compliments or concerns about an experience at your clinic, or if you wish to file a complaint, please contact [facility] Patient Relations at [phone] or email us at [email or web address]         Additional Information About Your Visit        MyChart Information     [employer] gives you secure access to your electronic health  record. If you see a primary care provider, you can also send messages to your care team and make appointments. If you have questions, please call your primary care clinic.  If you do not have a primary care provider, please call [phone] and they will assist you.      [employer] is an electronic gateway that provides easy, online access to your medical records. With [employer], you can request a clinic appointment, read your test results, renew a prescription or communicate with your care team.     To access your existing account, please contact your [facility] or call [phone] for assistance.        Care EveryWhere ID     This is your Care EveryWhere ID. This could be used by other organizations to access your [place] medical records  [id number]        Your Vitals Were     Pulse Temperature Height Pulse Oximetry BMI (Body Mass Index)       88 98.8  F (37.1  C) (Oral) 6' 99% 25.77 kg/m2        Blood Pressure from Last 3 Encounters:   12/13/17 170/87   12/11/17 (!) 174/92   12/06/17 (!) 163/93    Weight from Last 3 Encounters:   12/13/17 190 lb 0.6 oz   12/13/17 190 lb   12/06/17 197 lb 9.6 oz              Today, you had the following     No orders found for display         Today's Medication Changes          These changes are accurate as of: 12/13/17 11:59 PM.  If you have any questions, ask your nurse or doctor.               These medicines have changed or have updated prescriptions.        Dose/Directions    amLODIPine 10 MG tablet   Commonly known as:  NORVASC   This may have changed:  when to take this   Used for:  HTN (hypertension)        Dose:  10 mg   Take 1 tablet (10 mg) by mouth daily   Quantity:  90 tablet   Refills:  3       loperamide 2 MG capsule   Commonly known as:  IMODIUM   This may have changed:    - how much to take  - when to take this  - reasons to take this   Used for:  S/P right hemicolectomy        Dose:  4 mg   Take 2 capsules (4 mg) by mouth 2 times  daily   Quantity:  120 capsule   Refills:  3       magnesium oxide 400 (241.3 MG) MG tablet   Commonly known as:  MAG-OX   This may have changed:  when to take this   Used for:  Hypomagnesemia, CKD (chronic kidney disease) stage 3, GFR 30-59 ml/min        Dose:  400 mg   Take 1 tablet (400 mg) by mouth 2 times daily   Quantity:  180 tablet   Refills:  3                Primary Care Provider Office Phone # Fax #    Shay Kirkpatrick [phone]       [address] [place] [address] [address] [place] [id number]        Equal Access to Services     [facility]: Hadii tavo swartz hadasho Soomaali, waaxda luqadaha, qaybta kaalmada magdaleno, devi craig . So [facility] [phone].    ATENCIÓN: Si habla español, tiene a zheng disposición servicios gratuitos de asistencia lingüística. [facility] [phone].    We comply with applicable federal civil rights laws and [place] laws. We do not discriminate on the basis of race, color, national origin, age, disability, sex, sexual orientation, or gender identity.            Thank you!     Thank you for choosing [facility] COLON AND RECTAL SURGERY  for your care. Our goal is always to provide you with excellent care. Hearing back from our patients is one way we can continue to improve our services. Please take a few minutes to complete the written survey that you may receive in the mail after your visit with us. Thank you!             Your Updated Medication List - Protect others around you: Learn how to safely use, store and throw away your medicines at www.disposemymeds.org.          This list is accurate as of: 12/13/17 11:59 PM.  Always use your most recent med list.                   Brand Name Dispense Instructions for use Diagnosis    amLODIPine 10 MG tablet    NORVASC    90 tablet    Take 1 tablet (10 mg) by mouth daily    HTN (hypertension)       cholecalciferol 1000 UNIT tablet    vitamin D3    100 tablet    Take 1 tablet (1,000 Units) by mouth daily     Vitamin D deficiency       CIALIS 5 MG tablet   Generic drug:  tadalafil      Take 5 mg by mouth as needed        cyclobenzaprine 10 MG tablet    FLEXERIL    90 tablet    Take 1 tablet (10 mg) by mouth 3 times daily as needed for muscle spasms    Immunosuppression (H)       fludrocortisone 0.1 MG tablet    FLORINEF    90 tablet    Take 3 tablets (0.3 mg) by mouth daily    Hyperkalemia       furosemide 20 MG tablet    LASIX    60 tablet    Take 1 tablet (20 mg) by mouth 2 times daily    Hyperkalemia       GABAPENTIN PO      Take 300 mg by mouth 3 times daily        insulin glargine 100 UNIT/ML injection    LANTUS     Inject 50 Units Subcutaneous every morning        linagliptin 5 MG Tabs tablet    TRADJENTA     Take 5 mg by mouth every evening        loperamide 2 MG capsule    IMODIUM    120 capsule    Take 2 capsules (4 mg) by mouth 2 times daily    S/P right hemicolectomy       magnesium oxide 400 (241.3 MG) MG tablet    MAG-OX    180 tablet    Take 1 tablet (400 mg) by mouth 2 times daily    Hypomagnesemia, CKD (chronic kidney disease) stage 3, GFR 30-59 ml/min       mycophenolic acid 360 MG EC tablet    GENERIC EQUIVALENT    120 tablet    Take 2 tablets (720 mg) by mouth every 12 hours    History of liver transplant (H), Long-term use of immunosuppressant medication       OMEPRAZOLE PO      Take 20 mg by mouth every morning        oxyCODONE IR 10 MG tablet    ROXICODONE    30 tablet    Take 1 tablet (10 mg) by mouth daily as needed for moderate to severe pain    Osteoarthritis       patiromer 8.4 G packet    VELTASSA    30 each    Take 8.4 g by mouth daily    Hyperkalemia       predniSONE 5 MG tablet    DELTASONE    30 tablet    Take 1 tablet (5 mg) by mouth daily    Liver transplant recipient (H), Long-term use of immunosuppressant medication       tacrolimus 1 MG capsule    GENERIC EQUIVALENT    60 capsule    Take 1 capsule (1 mg) by mouth every 12 hours    Liver transplant recipient (H)

## 2017-12-13 NOTE — LETTER
2017       RE: Camacho Bhagat  [address] [facility] [address]     Dear Colleague,    Thank you for referring your patient, Camacho Bhagat, to the [facility] COLON AND RECTAL SURGERY at [facility]. Please see a copy of my visit note below.    Colon and Rectal Surgery Clinic Note    Referring provider:  LAMIN Leblanc CNP  [address] [place] [address]       RE: Camacho Bhagat  : 1965  BISI: 2017      Camacho Bhagat is a very pleasant 52 year old male status post liver transplantation here for follow-up of after exploratory laparotomy, lysis of adhesions, right hemicolectomy, end ileostomy, mucous fistula, and partial omentectomy, 2017 for retroperitoneal pericolonic air with sepsis after a recent colonoscopy with biopsies and associated colitis. He presents ow for a pre-operative visit.    Interval history: The patient is doing well. Is nutrition is good. He is anxious to have reversal of his stoma. His appetite is good and he is getting around well. His weight is stable. He denies any acute health issues.    PLEASE SEE NOTE BELOW FOR PHYSICAL EXAMINATION, REVIEW OF SYSTEMS, AND OTHER HISTORY.    Assessment/Plan: 52 year old male status post liver transplantation here for follow-up of after exploratory laparotomy, lysis of adhesions, right hemicolectomy, end ileostomy, mucous fistula, and partial omentectomy, 2017 for retroperitoneal pericolonic air with sepsis after a recent colonoscopy with biopsies and associated colitis. Overall very improved now planning for reversal of his stoma.      He is frustrated to have had to wait 6 months postoperatively. He had very significant adhesions and inflammation at the time of surgery.      Procedure: Exploratory laparotomy, lysis of adhesions, takedown end ileostomy and mucous fistula with ileocolic anastomosis.    No bowel preparation, operating room position: supine, to Preoperative  Assessment Center (PAC) prior to surgery. No other preoperative diagnostic imaging.     We spoke about post-operative expectations and recovery after surgery.    Total time was 25 minutes, over 50% was spent counseling the patient.       PLEASE SEE NOTE BELOW FOR PHYSICAL EXAMINATION, REVIEW OF SYSTEMS, AND OTHER HISTORY.      Sara Dinh MD  Colon and Rectal Surgery Attending  Department of Surgery  [facility]    -------------------------------------------------------------------------------------------------------------------    Medical history:  Past Medical History:   Diagnosis Date     Depressive disorder, not elsewhere classified      Diabetes type 2, controlled (H) 11/10/2016     Esophageal reflux      Fibromyalgia 1/2009    dx with Dr Benitez( Rheum)     Gangrene of finger (H) 8/25/2017     H/O deep venous thrombosis 11/2001    Permanent IVC filter in place.     H/O CASTAÑEDA (nonalcoholic steatohepatitis)      H/O Pneumonia, organism unspecified(486) 10/2001    Included ARDS, sepsis, and  acute renal failure; hospitalized, required tracheostomy placement.     H/O: HTN (hypertension) 11/2001    No longer prescribed antihypertensive medication.       History of hepatocellular carcinoma      History of liver transplant (H)      History of obstructive sleep apnea     No longer wears CPAP since losing approximately 200 pounds with his liver transplant and its complications.       HLD (hyperlipidemia)      Ischemia of both lower extremities 8/25/2017    Distal ischemia due to shock/high pressor requirements     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     Osteoarthritis      Presence of PERMANENT IVC filter      Rheumatoid arthritis(714.0)        Surgical history:  Past Surgical History:   Procedure Laterality Date     BENCH LIVER N/A 3/4/2017    Procedure: BENCH LIVER;  Surgeon: Jovan Tran MD;  Location:  OR      TOTAL KNEE ARTHROPLASTY  2008    Right knee arthroscopy      CHOLECYSTECTOMY       COLONOSCOPY N/A 2017    Procedure: COMBINED COLONOSCOPY, SINGLE OR MULTIPLE BIOPSY/POLYPECTOMY BY BIOPSY;  Colonoscopy;  Surgeon: Izaiah Montes MD;  [facility]     ENT SURGERY      Repair of deviated septum     ESOPHAGOSCOPY, GASTROSCOPY, DUODENOSCOPY (EGD), COMBINED N/A 2016    Procedure: COMBINED ESOPHAGOSCOPY, GASTROSCOPY, DUODENOSCOPY (EGD), BIOPSY SINGLE OR MULTIPLE;  Surgeon: Trent Pederson MD;  Location:  GI     ESOPHAGOSCOPY, GASTROSCOPY, DUODENOSCOPY (EGD), COMBINED N/A 2017    Procedure: COMBINED ESOPHAGOSCOPY, GASTROSCOPY, DUODENOSCOPY (EGD);;  Surgeon: Los Wynn MD;  [facility]     LAPAROTOMY EXPLORATORY N/A 2017    Procedure: LAPAROTOMY EXPLORATORY;  Exploratory Laparotomy, washout;  Surgeon: Tip Zhang MD;  [facility]     LAPAROTOMY EXPLORATORY N/A 2017    Procedure: LAPAROTOMY EXPLORATORY;  Exploratory Laparotomy, Washout with closure.;  Surgeon: Tip Zhang MD;  [facility]     LAPAROTOMY EXPLORATORY N/A 2017    Procedure: LAPAROTOMY EXPLORATORY;  Exploratory Laparotomy, Right angelique-colectomy, end ileostomy, mucosal fistula, partial omentectomy;  Surgeon: Sara Dinh MD;  [facility] OR     ORTHOPEDIC SURGERY Right     Repair of dislocated wrist.       RELEASE TRIGGER FINGER Right 11/10/2017    Procedure: RELEASE TRIGGER FINGER;  Debride, V-Y Flap Right Index Finger;  Surgeon: Momo Noonan MD;  [facility]     TRACHEOSTOMY  2001    During hospitalization for pneumonia.       TRANSPLANT LIVER RECIPIENT  DONOR N/A 3/4/2017    Procedure: TRANSPLANT LIVER RECIPIENT  DONOR;  Surgeon: Jovan Tran MD;  Location:  OR       Problem list:    Patient Active Problem List    Diagnosis Date Noted     Peristomal skin complication 2017     Priority: Medium     Painful periwound skin 2017     Priority: Medium     Encounter for  attention to ileostomy (H) 11/16/2017     Priority: Medium     History of creation of ostomy 10/30/2017     Priority: Medium     Elevated serum creatinine 10/16/2017     Priority: Medium     Pancytopenia (H) 08/25/2017     Priority: Medium     Gangrene of finger (H) 08/25/2017     Priority: Medium     Ischemia of both lower extremities 08/25/2017     Priority: Medium     Distal ischemia due to shock/high pressor requirements       S/P liver transplant (H) 07/26/2017     Priority: Medium     Neutropenic colitis (H) 07/04/2017     Priority: Medium     Immunosuppressed status (H) 03/10/2017     Priority: Medium     Liver transplant recipient (H) 03/04/2017     Priority: Medium     Hyperkalemia 02/14/2017     Priority: Medium     Hepatocellular carcinoma (H) 01/27/2016     Priority: Medium     Osteoarthritis 01/18/2015     Priority: Medium     Rheumatoid arthritis (H) 01/18/2015     Priority: Medium     Medication refill- do not delete  11/13/2013     Priority: Medium     Fibromyalgia 08/15/2011     Priority: Medium     Sicca syndrome (H) 08/15/2011     Priority: Medium     Testicular hypofunction      Priority: Medium     Problem list name updated by automated process. Provider to review       Carpal tunnel syndrome 07/08/2002     Priority: Medium       Medications:  Current Outpatient Prescriptions   Medication Sig Dispense Refill     cholecalciferol (VITAMIN D3) 1000 UNIT tablet Take 1 tablet (1,000 Units) by mouth daily 100 tablet 3     predniSONE (DELTASONE) 5 MG tablet Take 1 tablet (5 mg) by mouth daily 30 tablet 11     mycophenolic acid (GENERIC EQUIVALENT) 360 MG EC tablet Take 2 tablets (720 mg) by mouth every 12 hours 120 tablet 3     patiromer (VELTASSA) 8.4 G packet Take 8.4 g by mouth daily 30 each 3     magnesium oxide (MAG-OX) 400 (241.3 MG) MG tablet Take 1 tablet (400 mg) by mouth 2 times daily (Patient taking differently: Take 400 mg by mouth daily ) 180 tablet 3     tacrolimus (GENERIC EQUIVALENT) 1  MG capsule Take 1 capsule (1 mg) by mouth every 12 hours 60 capsule 11     fludrocortisone (FLORINEF) 0.1 MG tablet Take 3 tablets (0.3 mg) by mouth daily 90 tablet 3     amLODIPine (NORVASC) 10 MG tablet Take 1 tablet (10 mg) by mouth daily (Patient taking differently: Take 10 mg by mouth every morning ) 90 tablet 3     oxyCODONE IR (ROXICODONE) 10 MG tablet Take 1 tablet (10 mg) by mouth daily as needed for moderate to severe pain 30 tablet 0     CIALIS 5 MG tablet Take 5 mg by mouth as needed   1     furosemide (LASIX) 20 MG tablet Take 1 tablet (20 mg) by mouth 2 times daily 60 tablet 3     OMEPRAZOLE PO Take 20 mg by mouth every morning        GABAPENTIN PO Take 300 mg by mouth 3 times daily       insulin glargine (LANTUS) 100 UNIT/ML injection Inject 50 Units Subcutaneous every morning       linagliptin (TRADJENTA) 5 MG TABS tablet Take 5 mg by mouth every evening        loperamide (IMODIUM) 2 MG capsule Take 2 capsules (4 mg) by mouth 2 times daily (Patient taking differently: Take 2 mg by mouth 2 times daily as needed ) 120 capsule 3     cyclobenzaprine (FLEXERIL) 10 MG tablet Take 1 tablet (10 mg) by mouth 3 times daily as needed for muscle spasms 90 tablet 0     sodium bicarbonate 650 MG tablet Take 2 tablets (1,300 mg) by mouth 3 times daily 180 tablet 3       Allergies:  Allergies   Allergen Reactions     Erythromycin GI Disturbance     Vioxx      Nausea, vomiting       Family history:  Family History   Problem Relation Age of Onset     DIABETES Father      Hypertension Father      Substance Abuse Father      Arthritis Mother      Thyroid Cancer Mother      Survivor!     Cervical Cancer Maternal Grandmother      CEREBROVASCULAR DISEASE Maternal Grandfather      Prostate Cancer Paternal Grandfather      Colon Cancer No family hx of      Hyperlipidemia No family hx of      Coronary Artery Disease No family hx of      Breast Cancer No family hx of        Social history:  Social History     Social History      Marital status:      Spouse name: N/A     Number of children: 1     Years of education: N/A     Occupational History            Social History Main Topics     Smoking status: Former Smoker     Packs/day: 0.75     Years: 2.00     Quit date: 1988     Smokeless tobacco: Former User     Types: Chew     Quit date: 10/8/2015     Alcohol use No      Comment: No alcohol since liver transplant.       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        Vitals:    12/13/17 1013   BP: 170/87   BP Location: Left arm   Patient Position: Chair   Cuff Size: Adult Large   Pulse: 88   Temp: 98.8  F (37.1  C)   TempSrc: Oral   SpO2: 99%   Weight: 190 lb   Height: 6'       Body mass index is 25.77 kg/(m^2).    Blayne HENRY LPN                Physical Examination:  /87 (BP Location: Left arm, Patient Position: Chair, Cuff Size: Adult Large)  Pulse 88  Temp 98.8  F (37.1  C) (Oral)  Ht 6'  Wt 190 lb  SpO2 99%  BMI 25.77 kg/m2  General: Pleasant, no acute distress  Abdomen: Soft, nontender, nondistended. No hepatosplenomegaly, no masses.  Well healed midline and end ileostomy. Mucous fistula in superior aspect of midline.      Again, thank you for allowing me to participate in the care of your patient.      Sincerely,    Sara Dinh MD

## 2017-12-13 NOTE — MR AVS SNAPSHOT
After Visit Summary   12/13/2017    Camacho Bhagat    MRN: [id number]           Patient Information     Date Of Birth          1965        Visit Information        Provider Department      12/13/2017 12:00 PM Pharmacist, Maria Luisa Miranda [facility] Preoperative Assessment [place]        Today's Diagnoses     Preop examination    -  1       Follow-ups after your visit        Your next 10 appointments already scheduled     Dec 13, 2017 12:30 PM CST   (Arrive by 12:15 PM)   PAC EVALUATION with  Pac Mary 5   [facility] Preoperative Assessment [place] ([facility])    [address] [place] [address]   [phone]            Dec 13, 2017  1:30 PM CST   (Arrive by 1:15 PM)   PAC RN ASSESSMENT with Maria Luisa Pac Rn   [facility] Assessment [place] ([facility])    [address] [place] [address]   [phone]            Dec 13, 2017  2:00 PM CST   (Arrive by 1:45 PM)   PAC Anesthesia Consult with Maria Luisa Pac Anesthesiologist   [facility] Assessment [place] ([facility])    [address] [place] [address]   [phone]            Dec 13, 2017  2:15 PM CST   LAB with MARIA LUISA LAB   [facility] Lab [facility])    [address] [place] [address]   [phone]           Please do not eat 10-12 hours before your appointment if you are coming in fasting for labs on lipids, cholesterol, or glucose (sugar). This does not apply to pregnant women. Water, hot tea and black coffee (with nothing added) are okay. Do not drink other fluids, diet soda or chew gum.            Dec 15, 2017 10:30 AM CST   (Arrive by 10:15 AM)   RETURN HAND with Momo Noonan MD   [facility] Orthopaedic [facility] ([facility])    [address] [place] [address]   [phone]            Jan 05, 2018    Procedure with Sara Dinh MD   [facility], [place], Same Day Surgery (--)    [address] [facility] [address] [phone]            Jan 16, 2018 11:00 AM CST   (Arrive by 10:45 AM)   Return Vascular Visit with LAMIN Lopez   [facility] ([facility])    [address] [facility] [address]  [address] [facility] [id number] [phone]            Jan 31, 2018  1:00 PM CST   (Arrive by 12:30 PM)   Return Visit with Cinda Cazares NP   [facility] Nephrology ([facility])    [address]  [address] [facility] [address] [phone]            Mar 02, 2018 10:45 AM CST   (Arrive by 10:30 AM)   Return Liver Transplant with Toñito Camejo MD   [facility] Hepatology ([facility])    [address]  [address] [facility] [id number] [phone]              Future tests that were ordered for you today     Open Standing Orders        Priority Remaining Interval Expires Ordered    CBC with platelets Routine 52/52 Weekly 12/13/2018 12/13/2017    Basic metabolic panel Routine 52/52 Weekly 12/13/2018 12/13/2017    Phosphorus Routine 52/52 Weekly 12/13/2018 12/13/2017    Magnesium Routine 52/52 Weekly 12/13/2018 12/13/2017    Hepatic panel Routine 52/52 Weekly 12/13/2018 12/13/2017          Open Future Orders        Priority Expected Expires Ordered    UA with Microscopic Routine 3/4/2018 12/13/2018 12/13/2017    Lipid Profile Routine 3/4/2018 12/13/2018 12/13/2017    Protein  random urine with Creat Ratio Routine 3/4/2018 12/13/2018 12/13/2017            Who to contact     Please call your clinic at [phone] to:    Ask questions about your health    Make or cancel appointments    Discuss your medicines    Learn about your test results    Speak to your doctor   If you have compliments or concerns about an experience at your clinic, or if you wish to file a complaint, please contact  [facility] Patient Relations at [phone] or email us at [email or web address]         Additional Information About Your Visit        Voltage Securityhart Information     V[employer] Securityhart gives you secure access to your electronic health record. If you see a primary care provider, you can also send messages to your care team and make appointments. If you have questions, please call your primary care clinic.  If you do not have a primary care provider, please call [phone] and they will assist you.      Shahiya is an electronic gateway that provides easy, online access to your medical records. With Shahiya, you can request a clinic appointment, read your test results, renew a prescription or communicate with your care team.     To access your existing account, please contact your [facility] or call [phone] for assistance.        Care EveryWhere ID     This is your Care EveryWhere ID. This could be used by other organizations to access your [place] medical records  [id number]         Blood Pressure from Last 3 Encounters:   12/13/17 170/87   12/11/17 (!) 174/92   12/06/17 (!) 163/93    Weight from Last 3 Encounters:   12/13/17 86.2 kg (190 lb)   12/06/17 89.6 kg (197 lb 9.6 oz)   12/01/17 87.4 kg (192 lb 9.6 oz)              Today, you had the following     No orders found for display         Today's Medication Changes          These changes are accurate as of: 12/13/17 12:23 PM.  If you have any questions, ask your nurse or doctor.               These medicines have changed or have updated prescriptions.        Dose/Directions    amLODIPine 10 MG tablet   Commonly known as:  NORVASC   This may have changed:  when to take this   Used for:  HTN (hypertension)        Dose:  10 mg   Take 1 tablet (10 mg) by mouth daily   Quantity:  90 tablet   Refills:  3       loperamide 2 MG capsule   Commonly known as:  IMODIUM   This may have changed:    - how much to  take  - when to take this  - reasons to take this   Used for:  S/P right hemicolectomy        Dose:  4 mg   Take 2 capsules (4 mg) by mouth 2 times daily   Quantity:  120 capsule   Refills:  3       magnesium oxide 400 (241.3 MG) MG tablet   Commonly known as:  MAG-OX   This may have changed:  when to take this   Used for:  Hypomagnesemia, CKD (chronic kidney disease) stage 3, GFR 30-59 ml/min        Dose:  400 mg   Take 1 tablet (400 mg) by mouth 2 times daily   Quantity:  180 tablet   Refills:  3                Primary Care Provider Office Phone # Fax #    Shay Kirkpatrick [phone]       [address] [place] [address] [address] [place] [address]        Equal Access to Services     FRANKLIN PORTILLO : Todd Carlisle, wabrody gaston, qaybfranck kaalmamachelle dolan, devi duckworth. So [facility] [phone].    ATENCIÓN: Si habla español, tiene a zheng disposición servicios gratuitos de asistencia lingüística. [facility] [phone].    We comply with applicable federal civil rights laws and [place] laws. We do not discriminate on the basis of race, color, national origin, age, disability, sex, sexual orientation, or gender identity.            Thank you!     Thank you for choosing [facility] PREOPERATIVE ASSESSMENT CENTER  for your care. Our goal is always to provide you with excellent care. Hearing back from our patients is one way we can continue to improve our services. Please take a few minutes to complete the written survey that you may receive in the mail after your visit with us. Thank you!             Your Updated Medication List - Protect others around you: Learn how to safely use, store and throw away your medicines at www.disposemymeds.org.          This list is accurate as of: 12/13/17 12:23 PM.  Always use your most recent med list.                   Brand Name Dispense Instructions for use Diagnosis    amLODIPine 10 MG tablet    NORVASC    90 tablet    Take 1 tablet (10 mg)  by mouth daily    HTN (hypertension)       cholecalciferol 1000 UNIT tablet    vitamin D3    100 tablet    Take 1 tablet (1,000 Units) by mouth daily    Vitamin D deficiency       CIALIS 5 MG tablet   Generic drug:  tadalafil      Take 5 mg by mouth as needed        cyclobenzaprine 10 MG tablet    FLEXERIL    90 tablet    Take 1 tablet (10 mg) by mouth 3 times daily as needed for muscle spasms    Immunosuppression (H)       fludrocortisone 0.1 MG tablet    FLORINEF    90 tablet    Take 3 tablets (0.3 mg) by mouth daily    Hyperkalemia       furosemide 20 MG tablet    LASIX    60 tablet    Take 1 tablet (20 mg) by mouth 2 times daily    Hyperkalemia       GABAPENTIN PO      Take 300 mg by mouth 3 times daily        insulin glargine 100 UNIT/ML injection    LANTUS     Inject 50 Units Subcutaneous every morning        linagliptin 5 MG Tabs tablet    TRADJENTA     Take 5 mg by mouth every evening        loperamide 2 MG capsule    IMODIUM    120 capsule    Take 2 capsules (4 mg) by mouth 2 times daily    S/P right hemicolectomy       magnesium oxide 400 (241.3 MG) MG tablet    MAG-OX    180 tablet    Take 1 tablet (400 mg) by mouth 2 times daily    Hypomagnesemia, CKD (chronic kidney disease) stage 3, GFR 30-59 ml/min       mycophenolic acid 360 MG EC tablet    GENERIC EQUIVALENT    120 tablet    Take 2 tablets (720 mg) by mouth every 12 hours    History of liver transplant (H), Long-term use of immunosuppressant medication       OMEPRAZOLE PO      Take 20 mg by mouth every morning        oxyCODONE IR 10 MG tablet    ROXICODONE    30 tablet    Take 1 tablet (10 mg) by mouth daily as needed for moderate to severe pain    Osteoarthritis       patiromer 8.4 G packet    VELTASSA    30 each    Take 8.4 g by mouth daily    Hyperkalemia       predniSONE 5 MG tablet    DELTASONE    30 tablet    Take 1 tablet (5 mg) by mouth daily    Liver transplant recipient (H), Long-term use of immunosuppressant medication       sodium  bicarbonate 650 MG tablet     180 tablet    Take 2 tablets (1,300 mg) by mouth 3 times daily    Hyperkalemia       tacrolimus 1 MG capsule    GENERIC EQUIVALENT    60 capsule    Take 1 capsule (1 mg) by mouth every 12 hours    Liver transplant recipient (H)

## 2017-12-13 NOTE — MR AVS SNAPSHOT
After Visit Summary   2017    Camacho Bhagat    MRN: [id number]           Patient Information     Date Of Birth          1965        Visit Information        Provider Department      2017 1:30 PM Rn, [facility] Preoperative Assessment Center        Care Instructions    Preparing for Your Surgery      Name:  Camacho Bhagat   MRN:  [id number]   :  1965   Today's Date:  2017     Arriving for surgery:  Surgery date:  18  Arrival time:  5:30 am    Please come to:    [facility], 3rd floor, [address]    [address] [address]    -   parking is available in front of the hospital from 5:15 am to 8:00 pm    -  Stop at the Information Desk in the lobby    -   Inform the information person that you are here for surgery. An escort to 3c will be provided. If you would not like an escort, please proceed to 3C on the 3rd floor. [phone]     What can I eat or drink?  -  You may have solid food or milk products until 8 hours prior to your surgery (until 11:30 pm).  -  You may have water, gatorade, apple juice or 7up/Sprite until 2 hours prior to your surgery (until 5:30 am).    Which medicines can I take?        -  Hold vitamin D for 7 days before surgery.        -  Avoid Cialis for 24 hours before surgery.    -  Do NOT take these medications in the morning, the day of surgery:  Furosemide.    -  Take these medications the day of surgery: 40 units Lantus insulin and all other regular morning medications.    How do I prepare myself?  -  Take two showers: one the night before surgery; and one the morning of surgery.         Use Scrubcare or Hibiclens to wash from neck down.  You may use your own shampoo and conditioner. No other hair products.   -  Do NOT use lotion, powder, deodorant, or antiperspirant the day of your surgery.  -  Do NOT wear any makeup, fingernail polish or jewelry.  -  Begin using Incentive Spirometer 1  week prior to surgery.  Use 4 times per day, up to 5 breaths each time.  Bring Incentive Spirometer to hospital.  -  Bring your ID and insurance card.    Questions or Concerns:  If you have questions or concerns, please call the  Preoperative Assessment Center, Monday-Friday 7AM-7PM:  [phone].          Follow-ups after your visit        Your next 10 appointments already scheduled     Dec 13, 2017  1:30 PM CST   (Arrive by 1:15 PM)   PAC RN ASSESSMENT with  Pac Rn   [facility] Preoperative Assessment Center ([facility])    [address] [place] [address]   [phone]            Dec 13, 2017  2:00 PM CST   (Arrive by 1:45 PM)   PAC Anesthesia Consult with  Pac Anesthesiologist   [facility] Preoperative Assessment [place] ([facility])    [address] [place] [address]   [phone]            Dec 13, 2017  2:15 PM CST   LAB with  LAB   [facility] Lab ([facility])    [address] [place] [address]   [phone]           Please do not eat 10-12 hours before your appointment if you are coming in fasting for labs on lipids, cholesterol, or glucose (sugar). This does not apply to pregnant women. Water, hot tea and black coffee (with nothing added) are okay. Do not drink other fluids, diet soda or chew gum.            Dec 15, 2017 10:30 AM CST   (Arrive by 10:15 AM)   RETURN HAND with Momo Noonan MD   [facility] Orthopaedic [facility] ([facility])    [address] [place] [address] [phone]            Jan 05, 2018   Procedure with Sara Dinh MD   [facility] [place], Same Day Surgery (--)    [address] [facility] [address]   [phone]            Jan 16, 2018 11:00 AM CST   (Arrive by 10:45 AM)   Return Vascular Visit with LAMIN Lopez   [facility] Vascular [facility] (  [facility])    [address] [place] [address]   [phone]            Jan 31, 2018  1:00 PM CST   (Arrive by 12:30 PM)   Return Visit with Cinda Cazares NP   [facility] Nephrology ([facility])    [address] [place] [id number] [phone]            Mar 02, 2018 10:45 AM CST   (Arrive by 10:30 AM)   Return Liver Transplant with Toñito Camejo MD   [facility] Hepatology ([facility])    [address] [place] [id number] [phone]              Future tests that were ordered for you today     Open Standing Orders        Priority Remaining Interval Expires Ordered    CBC with platelets Routine 52/52 Weekly 12/13/2018 12/13/2017    Basic metabolic panel Routine 52/52 Weekly 12/13/2018 12/13/2017    Phosphorus Routine 52/52 Weekly 12/13/2018 12/13/2017    Magnesium Routine 52/52 Weekly 12/13/2018 12/13/2017    Hepatic panel Routine 52/52 Weekly 12/13/2018 12/13/2017          Open Future Orders        Priority Expected Expires Ordered    UA with Microscopic Routine 3/4/2018 12/13/2018 12/13/2017    Lipid Profile Routine 3/4/2018 12/13/2018 12/13/2017    Protein  random urine with Creat Ratio Routine 3/4/2018 12/13/2018 12/13/2017            Who to contact     Please call your clinic at [phone] to:    Ask questions about your health    Make or cancel appointments    Discuss your medicines    Learn about your test results    Speak to your doctor   If you have compliments or concerns about an experience at your clinic, or if you wish to file a complaint, please contact [facility] Patient Relations at [phone] or email us at [email or web address]         Additional Information About Your Visit        MyChart Information     MyChart gives you secure access to your electronic health record. If you see a primary care  provider, you can also send messages to your care team and make appointments. If you have questions, please call your primary care clinic.  If you do not have a primary care provider, please call [phone] and they will assist you.      [employer]System[employer] is an electronic gateway that provides easy, online access to your medical records. With [employer], you can request a clinic appointment, read your test results, renew a prescription or communicate with your care team.     To access your existing account, please contact your [facility] or call [phone] for assistance.        Care EveryWhere ID     This is your Care EveryWhere ID. This could be used by other organizations to access your [place] medical records  [id number]         Blood Pressure from Last 3 Encounters:   12/13/17 170/87   12/11/17 (!) 174/92   12/06/17 (!) 163/93    Weight from Last 3 Encounters:   12/13/17 86.2 kg (190 lb 0.6 oz)   12/13/17 86.2 kg (190 lb)   12/06/17 89.6 kg (197 lb 9.6 oz)              Today, you had the following     No orders found for display         Today's Medication Changes          These changes are accurate as of: 12/13/17 12:41 PM.  If you have any questions, ask your nurse or doctor.               These medicines have changed or have updated prescriptions.        Dose/Directions    amLODIPine 10 MG tablet   Commonly known as:  NORVASC   This may have changed:  when to take this   Used for:  HTN (hypertension)        Dose:  10 mg   Take 1 tablet (10 mg) by mouth daily   Quantity:  90 tablet   Refills:  3       loperamide 2 MG capsule   Commonly known as:  IMODIUM   This may have changed:    - how much to take  - when to take this  - reasons to take this   Used for:  S/P right hemicolectomy        Dose:  4 mg   Take 2 capsules (4 mg) by mouth 2 times daily   Quantity:  120 capsule   Refills:  3       magnesium oxide 400 (241.3 MG) MG tablet   Commonly known as:  MAG-OX   This may have changed:   when to take this   Used for:  Hypomagnesemia, CKD (chronic kidney disease) stage 3, GFR 30-59 ml/min        Dose:  400 mg   Take 1 tablet (400 mg) by mouth 2 times daily   Quantity:  180 tablet   Refills:  3                Primary Care Provider Office Phone # Fax #    Shay Kirkpatrick [phone]       [address] [place] [address]  [facility] [address]        Equal Access to Services     FRANKLIN PORTILLO : Hadii aad ku hadasho Soomaali, waaxda luqadaha, qaybta kaalmada adeegyada, waxay noelin hayaan adeeg rachelnerisbev lalashanda . So [facility] [phone].    ATENCIÓN: Si habla espmali, tiene a zheng disposición servicios gratuitos de asistencia lingüística. Llame al [phone].    We comply with applicable federal civil rights laws and [place] laws. We do not discriminate on the basis of race, color, national origin, age, disability, sex, sexual orientation, or gender identity.            Thank you!     Thank you for choosing [facility] PREOPERATIVE ASSESSMENT CENTER  for your care. Our goal is always to provide you with excellent care. Hearing back from our patients is one way we can continue to improve our services. Please take a few minutes to complete the written survey that you may receive in the mail after your visit with us. Thank you!             Your Updated Medication List - Protect others around you: Learn how to safely use, store and throw away your medicines at www.disposemymeds.org.          This list is accurate as of: 12/13/17 12:41 PM.  Always use your most recent med list.                   Brand Name Dispense Instructions for use Diagnosis    amLODIPine 10 MG tablet    NORVASC    90 tablet    Take 1 tablet (10 mg) by mouth daily    HTN (hypertension)       cholecalciferol 1000 UNIT tablet    vitamin D3    100 tablet    Take 1 tablet (1,000 Units) by mouth daily    Vitamin D deficiency       CIALIS 5 MG tablet   Generic drug:  tadalafil      Take 5 mg by mouth as needed        cyclobenzaprine 10 MG tablet     FLEXERIL    90 tablet    Take 1 tablet (10 mg) by mouth 3 times daily as needed for muscle spasms    Immunosuppression (H)       fludrocortisone 0.1 MG tablet    FLORINEF    90 tablet    Take 3 tablets (0.3 mg) by mouth daily    Hyperkalemia       furosemide 20 MG tablet    LASIX    60 tablet    Take 1 tablet (20 mg) by mouth 2 times daily    Hyperkalemia       GABAPENTIN PO      Take 300 mg by mouth 3 times daily        insulin glargine 100 UNIT/ML injection    LANTUS     Inject 50 Units Subcutaneous every morning        linagliptin 5 MG Tabs tablet    TRADJENTA     Take 5 mg by mouth every evening        loperamide 2 MG capsule    IMODIUM    120 capsule    Take 2 capsules (4 mg) by mouth 2 times daily    S/P right hemicolectomy       magnesium oxide 400 (241.3 MG) MG tablet    MAG-OX    180 tablet    Take 1 tablet (400 mg) by mouth 2 times daily    Hypomagnesemia, CKD (chronic kidney disease) stage 3, GFR 30-59 ml/min       mycophenolic acid 360 MG EC tablet    GENERIC EQUIVALENT    120 tablet    Take 2 tablets (720 mg) by mouth every 12 hours    History of liver transplant (H), Long-term use of immunosuppressant medication       OMEPRAZOLE PO      Take 20 mg by mouth every morning        oxyCODONE IR 10 MG tablet    ROXICODONE    30 tablet    Take 1 tablet (10 mg) by mouth daily as needed for moderate to severe pain    Osteoarthritis       patiromer 8.4 G packet    VELTASSA    30 each    Take 8.4 g by mouth daily    Hyperkalemia       predniSONE 5 MG tablet    DELTASONE    30 tablet    Take 1 tablet (5 mg) by mouth daily    Liver transplant recipient (H), Long-term use of immunosuppressant medication       sodium bicarbonate 650 MG tablet     180 tablet    Take 2 tablets (1,300 mg) by mouth 3 times daily    Hyperkalemia       tacrolimus 1 MG capsule    GENERIC EQUIVALENT    60 capsule    Take 1 capsule (1 mg) by mouth every 12 hours    Liver transplant recipient (H)

## 2017-12-15 ENCOUNTER — OFFICE VISIT (OUTPATIENT)
Dept: ORTHOPEDICS | Facility: CLINIC | Age: 52
End: 2017-12-15
Payer: COMMERCIAL

## 2017-12-15 DIAGNOSIS — I99.8 ISCHEMIC FINGER: Primary | ICD-10-CM

## 2017-12-15 NOTE — LETTER
12/15/2017       RE: Camacho Bhagat  [address] [facility] [address]     Dear Colleague,    Thank you for referring your patient, Camacho Bhagat, to the [facility] at [facility]. Please see a copy of my visit note below.    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        DATE OF SURGERY:  11/10/2017.      INTERVAL HISTORY:  Mr. Bhagat is a 52-year-old male who follows up now 6 weeks out from the aforementioned surgery.  He is doing well.  His V-Y flap has healed up nicely.  He has some minimal pain.  He has some ongoing mild swelling but his range of motion is returning nicely.  He does have a small scab at the tip of his finger though this is not tender or sensitive nor bothersome for him.  He has no specific questions or concerns today.      PHYSICAL EXAMINATION:  This is a well-appearing male in no acute distress.  He is pleasant throughout the interview today.  With regards to the right finger, his V-Y flap has healed immaculately.  He does have a small superficial scab over the very distal tip, which appears nearly ready to fall off.  He has excellent range of motion of the digit full extension and nearly full symmetric flexion as compared to the others at the DIP.  There is some mild swelling of the distal phalanx of the index finger on the right hand as compared to the other fingers, but no evidence of infection.      ASSESSMENT:  Six weeks status post V-Y advancement flap to the right index finger.      PLAN:  At this point, the patient can follow up on a p.r.n. basis.  His flap is well-healed with no evidence of infection.  We encouraged him to continue working on range of motion over the next weeks and months.  If he has any questions or concerns, we are more than happy to answer those over the phone or in clinic if need be.  The patient understands this and will call if he needs to be seen.         The patient was seen with Dr. Noonan today who agrees with above assessment and plan.         Again, thank you for allowing me to participate in the care of your patient.      Sincerely,    Momo Noonan MD

## 2017-12-15 NOTE — NURSING NOTE
Reason For Visit:   Chief Complaint   Patient presents with     RECHECK     Right index finger distal tip amputation. DOS 11/10/17           Pain Assessment  Patient Currently in Pain: No (comes and goes)    Hand Dominance Evaluation  Hand Dominance: Right

## 2017-12-15 NOTE — TELEPHONE ENCOUNTER
LM for Camacho to call back to touch base on his intentions with aranesp injections. Appears he is choosing to hold off, ok per Dr Hernández. He has appt with Jovanna Cazares 1/31 and can f/u on labs at that time.    Call date: 1/31    Sabine Sanchez, PharmD, BCACP  [phone]  December 15, 2017

## 2017-12-15 NOTE — MR AVS SNAPSHOT
After Visit Summary   12/15/2017    Camacho Bhagat    MRN: [id number]           Patient Information     Date Of Birth          1965        Visit Information        Provider Department      12/15/2017 10:30 AM Momo Noonan MD [facility]        Today's Diagnoses     Ischemic finger    -  1       Follow-ups after your visit        Your next 10 appointments already scheduled     Jan 16, 2018 11:00 AM CST   (Arrive by 10:45 AM)   Return Vascular Visit with LAMIN Lopez   [facility] ([facility])    [address]  3rd Floor  [facility] [id number] [phone]            Jan 31, 2018  1:00 PM CST   (Arrive by 12:30 PM)   Return Visit with Cinda Cazares NP   [facility] ([facility])    [address]  [address]  [place] [facility] [id number] [phone]            Mar 02, 2018 10:45 AM CST   (Arrive by 10:30 AM)   Return Liver Transplant with Toñito Camejo MD   [facility] ([facility])    [address]  [address]  [place] [facility] [id number] [phone]              Who to contact     Please call your clinic at [phone] to:    Ask questions about your health    Make or cancel appointments    Discuss your medicines    Learn about your test results    Speak to your doctor   If you have compliments or concerns about an experience at your clinic, or if you wish to file a complaint, please contact [facility] Patient Relations at [phone] or email us at [email or web address]         Additional Information About Your Visit        MyChart Information     [employer] gives you secure access to your electronic health record. If you see a primary care provider, you can also send messages to your care team and make appointments. If you have questions, please call your primary care clinic.  If you do  not have a primary care provider, please call [phone] and they will assist you.      [employer] is an electronic gateway that provides easy, online access to your medical records. With [employer], you can request a clinic appointment, read your test results, renew a prescription or communicate with your care team.     To access your existing account, please contact your [facility] or call [phone] for assistance.        Care EveryWhere ID     This is your Care EveryWhere ID. This could be used by other organizations to access your [place] medical records  [id number]         Blood Pressure from Last 3 Encounters:   01/05/18 167/90   12/13/17 170/87   12/11/17 (!) 174/92    Weight from Last 3 Encounters:   01/05/18 97.8 kg (215 lb 9.8 oz)   12/13/17 86.2 kg (190 lb 0.6 oz)   12/13/17 86.2 kg (190 lb)              Today, you had the following     No orders found for display         Today's Medication Changes          These changes are accurate as of: 12/15/17 11:59 PM.  If you have any questions, ask your nurse or doctor.               These medicines have changed or have updated prescriptions.        Dose/Directions    amLODIPine 10 MG tablet   Commonly known as:  NORVASC   This may have changed:  when to take this   Used for:  HTN (hypertension)        Dose:  10 mg   Take 1 tablet (10 mg) by mouth daily   Quantity:  90 tablet   Refills:  3       loperamide 2 MG capsule   Commonly known as:  IMODIUM   This may have changed:    - how much to take  - when to take this  - reasons to take this   Used for:  S/P right hemicolectomy        Dose:  4 mg   Take 2 capsules (4 mg) by mouth 2 times daily   Quantity:  120 capsule   Refills:  3       magnesium oxide 400 (241.3 MG) MG tablet   Commonly known as:  MAG-OX   This may have changed:  when to take this   Used for:  Hypomagnesemia, CKD (chronic kidney disease) stage 3, GFR 30-59 ml/min        Dose:  400 mg   Take 1 tablet (400 mg) by  mouth 2 times daily   Quantity:  180 tablet   Refills:  3                Primary Care Provider Office Phone # Fax #    Shay Kirkpatrick [phone]       [address] [place] [address]        Equal Access to Services     KOLEJOSE BANDAR AH: Todd tavo swartz marijao Sopitaali, waaxda luqadaha, qaybta kaalmada adeelenayada, devi oconnell sapphireelena cross laNemesiojarrod duckworth. So [facility] [phone].    ATENCIÓN: Si habla español, tiene a zheng disposición servicios gratuitos de asistencia lingüística. Llame al [phone].    We comply with applicable federal civil rights laws and [place] laws. We do not discriminate on the basis of race, color, national origin, age, disability, sex, sexual orientation, or gender identity.            Thank you!     Thank you for choosing [facility]  for your care. Our goal is always to provide you with excellent care. Hearing back from our patients is one way we can continue to improve our services. Please take a few minutes to complete the written survey that you may receive in the mail after your visit with us. Thank you!             Your Updated Medication List - Protect others around you: Learn how to safely use, store and throw away your medicines at www.disposemymeds.org.          This list is accurate as of: 12/15/17 11:59 PM.  Always use your most recent med list.                   Brand Name Dispense Instructions for use Diagnosis    amLODIPine 10 MG tablet    NORVASC    90 tablet    Take 1 tablet (10 mg) by mouth daily    HTN (hypertension)       cholecalciferol 1000 UNIT tablet    vitamin D3    100 tablet    Take 1 tablet (1,000 Units) by mouth daily    Vitamin D deficiency       CIALIS 5 MG tablet   Generic drug:  tadalafil      Take 5 mg by mouth as needed        cyclobenzaprine 10 MG tablet    FLEXERIL    90 tablet    Take 1 tablet (10 mg) by mouth 3 times daily as needed for muscle spasms    Immunosuppression (H)       fludrocortisone 0.1 MG tablet     FLORINEF    90 tablet    Take 3 tablets (0.3 mg) by mouth daily    Hyperkalemia       furosemide 20 MG tablet    LASIX    60 tablet    Take 1 tablet (20 mg) by mouth 2 times daily    Hyperkalemia       GABAPENTIN PO      Take 300 mg by mouth 3 times daily        insulin glargine 100 UNIT/ML injection    LANTUS     Inject 50 Units Subcutaneous every morning        linagliptin 5 MG Tabs tablet    TRADJENTA     Take 5 mg by mouth every evening        loperamide 2 MG capsule    IMODIUM    120 capsule    Take 2 capsules (4 mg) by mouth 2 times daily    S/P right hemicolectomy       magnesium oxide 400 (241.3 MG) MG tablet    MAG-OX    180 tablet    Take 1 tablet (400 mg) by mouth 2 times daily    Hypomagnesemia, CKD (chronic kidney disease) stage 3, GFR 30-59 ml/min       mycophenolic acid 360 MG EC tablet    GENERIC EQUIVALENT    120 tablet    Take 2 tablets (720 mg) by mouth every 12 hours    History of liver transplant (H), Long-term use of immunosuppressant medication       OMEPRAZOLE PO      Take 20 mg by mouth every morning        oxyCODONE IR 10 MG tablet    ROXICODONE    30 tablet    Take 1 tablet (10 mg) by mouth daily as needed for moderate to severe pain    Osteoarthritis       patiromer 8.4 G packet    VELTASSA    30 each    Take 8.4 g by mouth daily    Hyperkalemia       predniSONE 5 MG tablet    DELTASONE    30 tablet    Take 1 tablet (5 mg) by mouth daily    Liver transplant recipient (H), Long-term use of immunosuppressant medication       tacrolimus 1 MG capsule    GENERIC EQUIVALENT    60 capsule    Take 1 capsule (1 mg) by mouth every 12 hours    Liver transplant recipient (H)

## 2017-12-15 NOTE — PROGRESS NOTES
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        DATE OF SURGERY:  11/10/2017.      INTERVAL HISTORY:  Mr. Bhagat is a 52-year-old male who follows up now 6 weeks out from the aforementioned surgery.  He is doing well.  His V-Y flap has healed up nicely.  He has some minimal pain.  He has some ongoing mild swelling but his range of motion is returning nicely.  He does have a small scab at the tip of his finger though this is not tender or sensitive nor bothersome for him.  He has no specific questions or concerns today.      PHYSICAL EXAMINATION:  This is a well-appearing male in no acute distress.  He is pleasant throughout the interview today.  With regards to the right finger, his V-Y flap has healed immaculately.  He does have a small superficial scab over the very distal tip, which appears nearly ready to fall off.  He has excellent range of motion of the digit full extension and nearly full symmetric flexion as compared to the others at the DIP.  There is some mild swelling of the distal phalanx of the index finger on the right hand as compared to the other fingers, but no evidence of infection.      ASSESSMENT:  Six weeks status post V-Y advancement flap to the right index finger.      PLAN:  At this point, the patient can follow up on a p.r.n. basis.  His flap is well-healed with no evidence of infection.  We encouraged him to continue working on range of motion over the next weeks and months.  If he has any questions or concerns, we are more than happy to answer those over the phone or in clinic if need be.  The patient understands this and will call if he needs to be seen.        The patient was seen with Dr. Noonan today who agrees with above assessment and plan.

## 2017-12-18 ENCOUNTER — TRANSFERRED RECORDS (OUTPATIENT)
Dept: HEALTH INFORMATION MANAGEMENT | Facility: CLINIC | Age: 52
End: 2017-12-18

## 2017-12-20 ENCOUNTER — HOSPITAL ENCOUNTER (OUTPATIENT)
Dept: LAB | Facility: CLINIC | Age: 52
Discharge: HOME | End: 2017-12-20
Payer: COMMERCIAL

## 2017-12-20 DIAGNOSIS — N18.4 CHRONIC KIDNEY DISEASE, STAGE 4 (SEVERE) (H): ICD-10-CM

## 2017-12-20 DIAGNOSIS — E87.5 HYPERKALEMIA: ICD-10-CM

## 2017-12-20 DIAGNOSIS — Z94.4 LIVER REPLACED BY TRANSPLANT (H): ICD-10-CM

## 2017-12-20 DIAGNOSIS — K63.1 COLON PERFORATION (H): ICD-10-CM

## 2017-12-20 LAB
ALBUMIN SERPL-MCNC: 3.3 G/DL (ref 3.4–5)
ALP SERPL-CCNC: 120 U/L (ref 40–150)
ALT SERPL W P-5'-P-CCNC: 20 U/L (ref 0–70)
ANION GAP SERPL CALCULATED.3IONS-SCNC: 5 MMOL/L (ref 3–14)
AST SERPL W P-5'-P-CCNC: 21 U/L (ref 0–45)
BILIRUB DIRECT SERPL-MCNC: 0.1 MG/DL (ref 0–0.2)
BILIRUB SERPL-MCNC: 0.3 MG/DL (ref 0.2–1.3)
BUN SERPL-MCNC: 19 MG/DL (ref 7–30)
CALCIUM SERPL-MCNC: 8.1 MG/DL (ref 8.5–10.1)
CHLORIDE SERPL-SCNC: 104 MMOL/L (ref 94–109)
CO2 SERPL-SCNC: 28 MMOL/L (ref 20–32)
CREAT SERPL-MCNC: 1.26 MG/DL (ref 0.66–1.25)
ERYTHROCYTE [DISTWIDTH] IN BLOOD BY AUTOMATED COUNT: 15.3 % (ref 10–15)
GFR SERPL CREATININE-BSD FRML MDRD: 60 ML/MIN/1.7M2
GLUCOSE SERPL-MCNC: 157 MG/DL (ref 70–99)
HCT VFR BLD AUTO: 27.9 % (ref 40–53)
HGB BLD-MCNC: 9.3 G/DL (ref 13.3–17.7)
INR PPP: 0.96 (ref 0.86–1.14)
MAGNESIUM SERPL-MCNC: 1.7 MG/DL (ref 1.6–2.3)
MCH RBC QN AUTO: 31.6 PG (ref 26.5–33)
MCHC RBC AUTO-ENTMCNC: 33.3 G/DL (ref 31.5–36.5)
MCV RBC AUTO: 95 FL (ref 78–100)
PHOSPHATE SERPL-MCNC: 3 MG/DL (ref 2.5–4.5)
PLATELET # BLD AUTO: 73 10E9/L (ref 150–450)
POTASSIUM SERPL-SCNC: 4.4 MMOL/L (ref 3.4–5.3)
PROT SERPL-MCNC: 7.2 G/DL (ref 6.8–8.8)
RBC # BLD AUTO: 2.94 10E12/L (ref 4.4–5.9)
SODIUM SERPL-SCNC: 137 MMOL/L (ref 133–144)
WBC # BLD AUTO: 5.3 10E9/L (ref 4–11)

## 2017-12-20 PROCEDURE — 84460 ALANINE AMINO (ALT) (SGPT): CPT

## 2017-12-20 PROCEDURE — 36415 COLL VENOUS BLD VENIPUNCTURE: CPT

## 2017-12-20 PROCEDURE — 80069 RENAL FUNCTION PANEL: CPT

## 2017-12-20 PROCEDURE — 82247 BILIRUBIN TOTAL: CPT

## 2017-12-20 PROCEDURE — 84075 ASSAY ALKALINE PHOSPHATASE: CPT

## 2017-12-20 PROCEDURE — 83735 ASSAY OF MAGNESIUM: CPT

## 2017-12-20 PROCEDURE — 84450 TRANSFERASE (AST) (SGOT): CPT

## 2017-12-20 PROCEDURE — 85610 PROTHROMBIN TIME: CPT

## 2017-12-20 PROCEDURE — 84155 ASSAY OF PROTEIN SERUM: CPT

## 2017-12-20 PROCEDURE — 85027 COMPLETE CBC AUTOMATED: CPT

## 2017-12-20 PROCEDURE — 82248 BILIRUBIN DIRECT: CPT

## 2017-12-20 RX ORDER — SODIUM BICARBONATE 650 MG/1
1300 TABLET ORAL 3 TIMES DAILY
Qty: 180 TABLET | Refills: 3 | Status: SHIPPED | OUTPATIENT
Start: 2017-12-20 | End: 2018-01-31

## 2017-12-20 NOTE — TELEPHONE ENCOUNTER
Returned pt's call, no answer. Requested return phone call and instructed him to ask to be transferred to me.

## 2017-12-20 NOTE — TELEPHONE ENCOUNTER
Pt returned call. Qtlarisa on his sodium bicarb script was only for a 15 day supply. Script in chart is correct, will resend to pharmacy.

## 2017-12-20 NOTE — TELEPHONE ENCOUNTER
Patient called- stated Sodium bicarb was called to [facility] incorrectly. Call Camacho coronel-

## 2017-12-21 ENCOUNTER — TELEPHONE (OUTPATIENT)
Dept: TRANSPLANT | Facility: CLINIC | Age: 52
End: 2017-12-21

## 2017-12-21 DIAGNOSIS — Z94.4 LIVER REPLACED BY TRANSPLANT (H): Primary | ICD-10-CM

## 2017-12-21 NOTE — TELEPHONE ENCOUNTER
Please let Camacho know to restart doing Tacrolimus level. Add to his orders.   From post liver standpoint, can decrease labs to every other week, but kidney may want a different frequency.

## 2017-12-22 NOTE — TELEPHONE ENCOUNTER
Let pt know we will start doing tacrolimus levels again and he can decrease lab frequency to every other week. Pt verbalized understanding and has no questions at this time.

## 2017-12-29 NOTE — PROGRESS NOTES
Colon and Rectal Surgery Clinic Note    Referring provider:  Felicia Tolliver, LAMIN CNP  [address] [place] [address]       RE: Camacho Bhagat  : 1965  BISI: 2017      Camacho Bhagat is a very pleasant 52 year old male status post liver transplantation here for follow-up of after exploratory laparotomy, lysis of adhesions, right hemicolectomy, end ileostomy, mucous fistula, and partial omentectomy, 2017 for retroperitoneal pericolonic air with sepsis after a recent colonoscopy with biopsies and associated colitis. He presents ow for a pre-operative visit.    Interval history: The patient is doing well. Is nutrition is good. He is anxious to have reversal of his stoma. His appetite is good and he is getting around well. His weight is stable. He denies any acute health issues.    PLEASE SEE NOTE BELOW FOR PHYSICAL EXAMINATION, REVIEW OF SYSTEMS, AND OTHER HISTORY.    Assessment/Plan: 52 year old male status post liver transplantation here for follow-up of after exploratory laparotomy, lysis of adhesions, right hemicolectomy, end ileostomy, mucous fistula, and partial omentectomy, 2017 for retroperitoneal pericolonic air with sepsis after a recent colonoscopy with biopsies and associated colitis. Overall very improved now planning for reversal of his stoma.      He is frustrated to have had to wait 6 months postoperatively. He had very significant adhesions and inflammation at the time of surgery.      Procedure: Exploratory laparotomy, lysis of adhesions, takedown end ileostomy and mucous fistula with ileocolic anastomosis.    No bowel preparation, operating room position: supine, to Preoperative Assessment Center (PAC) prior to surgery. No other preoperative diagnostic imaging.     We spoke about post-operative expectations and recovery after surgery.    Total time was 25 minutes, over 50% was spent counseling the patient.       PLEASE SEE NOTE BELOW FOR PHYSICAL EXAMINATION, REVIEW  OF SYSTEMS, AND OTHER HISTORY.      Sara Dinh MD  Colon and Rectal Surgery Attending  Department of Surgery  [facility]    -------------------------------------------------------------------------------------------------------------------    Medical history:  Past Medical History:   Diagnosis Date     Depressive disorder, not elsewhere classified      Diabetes type 2, controlled (H) 11/10/2016     Esophageal reflux      Fibromyalgia 1/2009    dx with Dr Benitez( Rheum)     Gangrene of finger (H) 8/25/2017     H/O deep venous thrombosis 11/2001    Permanent IVC filter in place.     H/O CASTAÑEDA (nonalcoholic steatohepatitis)      H/O Pneumonia, organism unspecified(486) 10/2001    Included ARDS, sepsis, and  acute renal failure; hospitalized, required tracheostomy placement.     H/O: HTN (hypertension) 11/2001    No longer prescribed antihypertensive medication.       History of hepatocellular carcinoma      History of liver transplant (H)      History of obstructive sleep apnea     No longer wears CPAP since losing approximately 200 pounds with his liver transplant and its complications.       HLD (hyperlipidemia)      Ischemia of both lower extremities 8/25/2017    Distal ischemia due to shock/high pressor requirements     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     Osteoarthritis      Presence of PERMANENT IVC filter      Rheumatoid arthritis(714.0)        Surgical history:  Past Surgical History:   Procedure Laterality Date     BENCH LIVER N/A 3/4/2017    Procedure: BENCH LIVER;  Surgeon: Jovan Tran MD;  Location:  OR      TOTAL KNEE ARTHROPLASTY  2008    Right knee arthroscopy     CHOLECYSTECTOMY       COLONOSCOPY N/A 7/21/2017    Procedure: COMBINED COLONOSCOPY, SINGLE OR MULTIPLE BIOPSY/POLYPECTOMY BY BIOPSY;  Colonoscopy;  Surgeon: Izaiah Montes MD;  Location:  GI     ENT SURGERY      Repair of deviated septum     ESOPHAGOSCOPY, GASTROSCOPY,  DUODENOSCOPY (EGD), COMBINED N/A 2016    Procedure: COMBINED ESOPHAGOSCOPY, GASTROSCOPY, DUODENOSCOPY (EGD), BIOPSY SINGLE OR MULTIPLE;  Surgeon: Trent Pederson MD;  Location: [facility] GI     ESOPHAGOSCOPY, GASTROSCOPY, DUODENOSCOPY (EGD), COMBINED N/A 2017    Procedure: COMBINED ESOPHAGOSCOPY, GASTROSCOPY, DUODENOSCOPY (EGD);;  Surgeon: Los Wynn MD;  Location: [facility] GI     LAPAROTOMY EXPLORATORY N/A 2017    Procedure: LAPAROTOMY EXPLORATORY;  Exploratory Laparotomy, washout;  Surgeon: Tip Zhang MD;  Location: [facility] OR     LAPAROTOMY EXPLORATORY N/A 2017    Procedure: LAPAROTOMY EXPLORATORY;  Exploratory Laparotomy, Washout with closure.;  Surgeon: Tip Zhang MD;  Location: [facility] OR     LAPAROTOMY EXPLORATORY N/A 2017    Procedure: LAPAROTOMY EXPLORATORY;  Exploratory Laparotomy, Right angelique-colectomy, end ileostomy, mucosal fistula, partial omentectomy;  Surgeon: Sara Dinh MD;  Location: [facility] OR     ORTHOPEDIC SURGERY Right     Repair of dislocated wrist.       RELEASE TRIGGER FINGER Right 11/10/2017    Procedure: RELEASE TRIGGER FINGER;  Debride, V-Y Flap Right Index Finger;  Surgeon: Momo Noonan MD;  [facility]     TRACHEOSTOMY  2001    During hospitalization for pneumonia.       TRANSPLANT LIVER RECIPIENT  DONOR N/A 3/4/2017    Procedure: TRANSPLANT LIVER RECIPIENT  DONOR;  Surgeon: Jovan Tran MD;  Location: [facility] OR       Problem list:    Patient Active Problem List    Diagnosis Date Noted     Peristomal skin complication 2017     Priority: Medium     Painful periwound skin 2017     Priority: Medium     Encounter for attention to ileostomy (H) 2017     Priority: Medium     History of creation of ostomy 10/30/2017     Priority: Medium     Elevated serum creatinine 10/16/2017     Priority: Medium     Pancytopenia (H) 2017     Priority: Medium     Gangrene of finger (H) 2017      Priority: Medium     Ischemia of both lower extremities 08/25/2017     Priority: Medium     Distal ischemia due to shock/high pressor requirements       S/P liver transplant (H) 07/26/2017     Priority: Medium     Neutropenic colitis (H) 07/04/2017     Priority: Medium     Immunosuppressed status (H) 03/10/2017     Priority: Medium     Liver transplant recipient (H) 03/04/2017     Priority: Medium     Hyperkalemia 02/14/2017     Priority: Medium     Hepatocellular carcinoma (H) 01/27/2016     Priority: Medium     Osteoarthritis 01/18/2015     Priority: Medium     Rheumatoid arthritis (H) 01/18/2015     Priority: Medium     Medication refill- do not delete  11/13/2013     Priority: Medium     Fibromyalgia 08/15/2011     Priority: Medium     Sicca syndrome (H) 08/15/2011     Priority: Medium     Testicular hypofunction      Priority: Medium     Problem list name updated by automated process. Provider to review       Carpal tunnel syndrome 07/08/2002     Priority: Medium       Medications:  Current Outpatient Prescriptions   Medication Sig Dispense Refill     cholecalciferol (VITAMIN D3) 1000 UNIT tablet Take 1 tablet (1,000 Units) by mouth daily 100 tablet 3     predniSONE (DELTASONE) 5 MG tablet Take 1 tablet (5 mg) by mouth daily 30 tablet 11     mycophenolic acid (GENERIC EQUIVALENT) 360 MG EC tablet Take 2 tablets (720 mg) by mouth every 12 hours 120 tablet 3     patiromer (VELTASSA) 8.4 G packet Take 8.4 g by mouth daily 30 each 3     magnesium oxide (MAG-OX) 400 (241.3 MG) MG tablet Take 1 tablet (400 mg) by mouth 2 times daily (Patient taking differently: Take 400 mg by mouth daily ) 180 tablet 3     tacrolimus (GENERIC EQUIVALENT) 1 MG capsule Take 1 capsule (1 mg) by mouth every 12 hours 60 capsule 11     fludrocortisone (FLORINEF) 0.1 MG tablet Take 3 tablets (0.3 mg) by mouth daily 90 tablet 3     amLODIPine (NORVASC) 10 MG tablet Take 1 tablet (10 mg) by mouth daily (Patient taking differently: Take 10 mg  by mouth every morning ) 90 tablet 3     oxyCODONE IR (ROXICODONE) 10 MG tablet Take 1 tablet (10 mg) by mouth daily as needed for moderate to severe pain 30 tablet 0     CIALIS 5 MG tablet Take 5 mg by mouth as needed   1     furosemide (LASIX) 20 MG tablet Take 1 tablet (20 mg) by mouth 2 times daily 60 tablet 3     OMEPRAZOLE PO Take 20 mg by mouth every morning        GABAPENTIN PO Take 300 mg by mouth 3 times daily       insulin glargine (LANTUS) 100 UNIT/ML injection Inject 50 Units Subcutaneous every morning       linagliptin (TRADJENTA) 5 MG TABS tablet Take 5 mg by mouth every evening        loperamide (IMODIUM) 2 MG capsule Take 2 capsules (4 mg) by mouth 2 times daily (Patient taking differently: Take 2 mg by mouth 2 times daily as needed ) 120 capsule 3     cyclobenzaprine (FLEXERIL) 10 MG tablet Take 1 tablet (10 mg) by mouth 3 times daily as needed for muscle spasms 90 tablet 0     sodium bicarbonate 650 MG tablet Take 2 tablets (1,300 mg) by mouth 3 times daily 180 tablet 3       Allergies:  Allergies   Allergen Reactions     Erythromycin GI Disturbance     Vioxx      Nausea, vomiting       Family history:  Family History   Problem Relation Age of Onset     DIABETES Father      Hypertension Father      Substance Abuse Father      Arthritis Mother      Thyroid Cancer Mother      Survivor!     Cervical Cancer Maternal Grandmother      CEREBROVASCULAR DISEASE Maternal Grandfather      Prostate Cancer Paternal Grandfather      Colon Cancer No family hx of      Hyperlipidemia No family hx of      Coronary Artery Disease No family hx of      Breast Cancer No family hx of        Social history:  Social History     Social History     Marital status:      Spouse name: N/A     Number of children: 1     Years of education: N/A     Occupational History            Social History Main Topics     Smoking status: Former Smoker     Packs/day: 0.75     Years: 2.00     Quit date: 1988      Smokeless tobacco: Former User     Types: Chew     Quit date: 10/8/2015     Alcohol use No      Comment: No alcohol since liver transplant.       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        Vitals:    12/13/17 1013   BP: 170/87   BP Location: Left arm   Patient Position: Chair   Cuff Size: Adult Large   Pulse: 88   Temp: 98.8  F (37.1  C)   TempSrc: Oral   SpO2: 99%   Weight: 190 lb   Height: 6'       Body mass index is 25.77 kg/(m^2).    Blayne HENRY LPN                        Physical Examination:  /87 (BP Location: Left arm, Patient Position: Chair, Cuff Size: Adult Large)  Pulse 88  Temp 98.8  F (37.1  C) (Oral)  Ht 6'  Wt 190 lb  SpO2 99%  BMI 25.77 kg/m2  General: Pleasant, no acute distress  Abdomen: Soft, nontender, nondistended. No hepatosplenomegaly, no masses.  Well healed midline and end ileostomy. Mucous fistula in superior aspect of midline.

## 2018-01-02 DIAGNOSIS — K63.1 COLON PERFORATION (H): ICD-10-CM

## 2018-01-02 DIAGNOSIS — Z94.4 LIVER REPLACED BY TRANSPLANT (H): ICD-10-CM

## 2018-01-02 DIAGNOSIS — D63.1 ANEMIA IN STAGE 3 CHRONIC KIDNEY DISEASE (H): ICD-10-CM

## 2018-01-02 DIAGNOSIS — N18.30 ANEMIA IN STAGE 3 CHRONIC KIDNEY DISEASE (H): ICD-10-CM

## 2018-01-02 DIAGNOSIS — N18.30 CKD (CHRONIC KIDNEY DISEASE) STAGE 3, GFR 30-59 ML/MIN (H): ICD-10-CM

## 2018-01-02 DIAGNOSIS — N18.4 CHRONIC KIDNEY DISEASE, STAGE 4 (SEVERE) (H): ICD-10-CM

## 2018-01-02 LAB
ABO + RH BLD: NORMAL
ABO + RH BLD: NORMAL
ALBUMIN SERPL-MCNC: 3.3 G/DL (ref 3.4–5)
ALP SERPL-CCNC: 123 U/L (ref 40–150)
ALT SERPL W P-5'-P-CCNC: 22 U/L (ref 0–70)
ANION GAP SERPL CALCULATED.3IONS-SCNC: 6 MMOL/L (ref 3–14)
AST SERPL W P-5'-P-CCNC: 26 U/L (ref 0–45)
BILIRUB DIRECT SERPL-MCNC: 0.1 MG/DL (ref 0–0.2)
BILIRUB SERPL-MCNC: 0.5 MG/DL (ref 0.2–1.3)
BLD GP AB SCN SERPL QL: NORMAL
BLOOD BANK CMNT PATIENT-IMP: NORMAL
BUN SERPL-MCNC: 24 MG/DL (ref 7–30)
CALCIUM SERPL-MCNC: 7.9 MG/DL (ref 8.5–10.1)
CHLORIDE SERPL-SCNC: 106 MMOL/L (ref 94–109)
CO2 SERPL-SCNC: 29 MMOL/L (ref 20–32)
CREAT SERPL-MCNC: 1.2 MG/DL (ref 0.66–1.25)
ERYTHROCYTE [DISTWIDTH] IN BLOOD BY AUTOMATED COUNT: 15 % (ref 10–15)
GFR SERPL CREATININE-BSD FRML MDRD: 63 ML/MIN/1.7M2
GLUCOSE SERPL-MCNC: 189 MG/DL (ref 70–99)
HCT VFR BLD AUTO: 28.8 % (ref 40–53)
HGB BLD-MCNC: 9.4 G/DL (ref 13.3–17.7)
INR PPP: 0.99 (ref 0.86–1.14)
MAGNESIUM SERPL-MCNC: 1.8 MG/DL (ref 1.6–2.3)
MCH RBC QN AUTO: 31 PG (ref 26.5–33)
MCHC RBC AUTO-ENTMCNC: 32.6 G/DL (ref 31.5–36.5)
MCV RBC AUTO: 95 FL (ref 78–100)
PHOSPHATE SERPL-MCNC: 3 MG/DL (ref 2.5–4.5)
PLATELET # BLD AUTO: 83 10E9/L (ref 150–450)
POTASSIUM SERPL-SCNC: 4.9 MMOL/L (ref 3.4–5.3)
PROT SERPL-MCNC: 7.1 G/DL (ref 6.8–8.8)
RBC # BLD AUTO: 3.03 10E12/L (ref 4.4–5.9)
SODIUM SERPL-SCNC: 140 MMOL/L (ref 133–144)
SPECIMEN EXP DATE BLD: NORMAL
TACROLIMUS BLD-MCNC: <3 UG/L (ref 5–15)
TME LAST DOSE: ABNORMAL H
WBC # BLD AUTO: 4.9 10E9/L (ref 4–11)

## 2018-01-04 ENCOUNTER — ANESTHESIA EVENT (OUTPATIENT)
Dept: SURGERY | Facility: CLINIC | Age: 53
End: 2018-01-04
Payer: COMMERCIAL

## 2018-01-05 ENCOUNTER — HOSPITAL ENCOUNTER (INPATIENT)
Facility: CLINIC | Age: 53
LOS: 2 days | Discharge: HOME | End: 2018-01-07
Attending: COLON & RECTAL SURGERY | Admitting: COLON & RECTAL SURGERY
Payer: COMMERCIAL

## 2018-01-05 ENCOUNTER — ANESTHESIA (OUTPATIENT)
Dept: SURGERY | Facility: CLINIC | Age: 53
End: 2018-01-05
Payer: COMMERCIAL

## 2018-01-05 DIAGNOSIS — Z93.2 ILEOSTOMY IN PLACE (H): Primary | ICD-10-CM

## 2018-01-05 LAB
ANION GAP SERPL CALCULATED.3IONS-SCNC: 8 MMOL/L (ref 3–14)
BUN SERPL-MCNC: 23 MG/DL (ref 7–30)
CALCIUM SERPL-MCNC: 8.4 MG/DL (ref 8.5–10.1)
CHLORIDE SERPL-SCNC: 106 MMOL/L (ref 94–109)
CO2 SERPL-SCNC: 25 MMOL/L (ref 20–32)
CREAT SERPL-MCNC: 1.06 MG/DL (ref 0.66–1.25)
ERYTHROCYTE [DISTWIDTH] IN BLOOD BY AUTOMATED COUNT: 15.4 % (ref 10–15)
GFR SERPL CREATININE-BSD FRML MDRD: 73 ML/MIN/1.7M2
GLUCOSE BLDC GLUCOMTR-MCNC: 104 MG/DL (ref 70–99)
GLUCOSE BLDC GLUCOMTR-MCNC: 109 MG/DL (ref 70–99)
GLUCOSE BLDC GLUCOMTR-MCNC: 120 MG/DL (ref 70–99)
GLUCOSE BLDC GLUCOMTR-MCNC: 88 MG/DL (ref 70–99)
GLUCOSE BLDC GLUCOMTR-MCNC: 99 MG/DL (ref 70–99)
GLUCOSE SERPL-MCNC: 94 MG/DL (ref 70–99)
HCT VFR BLD AUTO: 27.4 % (ref 40–53)
HGB BLD-MCNC: 8.9 G/DL (ref 13.3–17.7)
MCH RBC QN AUTO: 30.3 PG (ref 26.5–33)
MCHC RBC AUTO-ENTMCNC: 32.5 G/DL (ref 31.5–36.5)
MCV RBC AUTO: 93 FL (ref 78–100)
PLATELET # BLD AUTO: 71 10E9/L (ref 150–450)
POTASSIUM SERPL-SCNC: 4.1 MMOL/L (ref 3.4–5.3)
RBC # BLD AUTO: 2.94 10E12/L (ref 4.4–5.9)
SODIUM SERPL-SCNC: 139 MMOL/L (ref 133–144)
WBC # BLD AUTO: 5.7 10E9/L (ref 4–11)

## 2018-01-05 PROCEDURE — 25000128 H RX IP 250 OP 636: Performed by: ANESTHESIOLOGY

## 2018-01-05 PROCEDURE — 0DBB0ZZ EXCISION OF ILEUM, OPEN APPROACH: ICD-10-PCS | Performed by: COLON & RECTAL SURGERY

## 2018-01-05 PROCEDURE — 25000125 ZZHC RX 250: Performed by: STUDENT IN AN ORGANIZED HEALTH CARE EDUCATION/TRAINING PROGRAM

## 2018-01-05 PROCEDURE — 25000128 H RX IP 250 OP 636: Performed by: COLON & RECTAL SURGERY

## 2018-01-05 PROCEDURE — 00000146 ZZHCL STATISTIC GLUCOSE BY METER IP

## 2018-01-05 PROCEDURE — 36000062 ZZH SURGERY LEVEL 4 1ST 30 MIN - UMMC: Performed by: COLON & RECTAL SURGERY

## 2018-01-05 PROCEDURE — 36000064 ZZH SURGERY LEVEL 4 EA 15 ADDTL MIN - UMMC: Performed by: COLON & RECTAL SURGERY

## 2018-01-05 PROCEDURE — C9399 UNCLASSIFIED DRUGS OR BIOLOG: HCPCS | Performed by: NURSE ANESTHETIST, CERTIFIED REGISTERED

## 2018-01-05 PROCEDURE — 71000016 ZZH RECOVERY PHASE 1 LEVEL 3 FIRST HR: Performed by: COLON & RECTAL SURGERY

## 2018-01-05 PROCEDURE — 85027 COMPLETE CBC AUTOMATED: CPT | Performed by: NURSE PRACTITIONER

## 2018-01-05 PROCEDURE — 25000128 H RX IP 250 OP 636: Performed by: STUDENT IN AN ORGANIZED HEALTH CARE EDUCATION/TRAINING PROGRAM

## 2018-01-05 PROCEDURE — 37000008 ZZH ANESTHESIA TECHNICAL FEE, 1ST 30 MIN: Performed by: COLON & RECTAL SURGERY

## 2018-01-05 PROCEDURE — 25000125 ZZHC RX 250: Performed by: ANESTHESIOLOGY

## 2018-01-05 PROCEDURE — 25000125 ZZHC RX 250: Performed by: NURSE ANESTHETIST, CERTIFIED REGISTERED

## 2018-01-05 PROCEDURE — 25000131 ZZH RX MED GY IP 250 OP 636 PS 637: Performed by: STUDENT IN AN ORGANIZED HEALTH CARE EDUCATION/TRAINING PROGRAM

## 2018-01-05 PROCEDURE — 12000003 ZZH R&B CRITICAL UMMC

## 2018-01-05 PROCEDURE — 0DBL0ZZ EXCISION OF TRANSVERSE COLON, OPEN APPROACH: ICD-10-PCS | Performed by: COLON & RECTAL SURGERY

## 2018-01-05 PROCEDURE — 0DNW0ZZ RELEASE PERITONEUM, OPEN APPROACH: ICD-10-PCS | Performed by: COLON & RECTAL SURGERY

## 2018-01-05 PROCEDURE — 0WQF0ZZ REPAIR ABDOMINAL WALL, OPEN APPROACH: ICD-10-PCS | Performed by: COLON & RECTAL SURGERY

## 2018-01-05 PROCEDURE — 27210794 ZZH OR GENERAL SUPPLY STERILE: Performed by: COLON & RECTAL SURGERY

## 2018-01-05 PROCEDURE — 25000566 ZZH SEVOFLURANE, EA 15 MIN: Performed by: COLON & RECTAL SURGERY

## 2018-01-05 PROCEDURE — 80048 BASIC METABOLIC PNL TOTAL CA: CPT | Performed by: NURSE PRACTITIONER

## 2018-01-05 PROCEDURE — 25000132 ZZH RX MED GY IP 250 OP 250 PS 637: Performed by: STUDENT IN AN ORGANIZED HEALTH CARE EDUCATION/TRAINING PROGRAM

## 2018-01-05 PROCEDURE — 25000128 H RX IP 250 OP 636: Performed by: NURSE ANESTHETIST, CERTIFIED REGISTERED

## 2018-01-05 PROCEDURE — 88304 TISSUE EXAM BY PATHOLOGIST: CPT | Performed by: COLON & RECTAL SURGERY

## 2018-01-05 PROCEDURE — 71000017 ZZH RECOVERY PHASE 1 LEVEL 3 EA ADDTL HR: Performed by: COLON & RECTAL SURGERY

## 2018-01-05 PROCEDURE — 40000171 ZZH STATISTIC PRE-PROCEDURE ASSESSMENT III: Performed by: COLON & RECTAL SURGERY

## 2018-01-05 PROCEDURE — 37000009 ZZH ANESTHESIA TECHNICAL FEE, EACH ADDTL 15 MIN: Performed by: COLON & RECTAL SURGERY

## 2018-01-05 PROCEDURE — C9290 INJ, BUPIVACAINE LIPOSOME: HCPCS | Performed by: STUDENT IN AN ORGANIZED HEALTH CARE EDUCATION/TRAINING PROGRAM

## 2018-01-05 PROCEDURE — 36415 COLL VENOUS BLD VENIPUNCTURE: CPT | Performed by: NURSE PRACTITIONER

## 2018-01-05 RX ORDER — LIDOCAINE 40 MG/G
CREAM TOPICAL
Status: DISCONTINUED | OUTPATIENT
Start: 2018-01-05 | End: 2018-01-05

## 2018-01-05 RX ORDER — ONDANSETRON 2 MG/ML
4 INJECTION INTRAMUSCULAR; INTRAVENOUS EVERY 6 HOURS PRN
Status: DISCONTINUED | OUTPATIENT
Start: 2018-01-05 | End: 2018-01-07 | Stop reason: HOSPADM

## 2018-01-05 RX ORDER — MYCOPHENOLIC ACID 360 MG/1
720 TABLET, DELAYED RELEASE ORAL EVERY 12 HOURS
Status: DISCONTINUED | OUTPATIENT
Start: 2018-01-05 | End: 2018-01-07 | Stop reason: HOSPADM

## 2018-01-05 RX ORDER — ONDANSETRON 2 MG/ML
INJECTION INTRAMUSCULAR; INTRAVENOUS PRN
Status: DISCONTINUED | OUTPATIENT
Start: 2018-01-05 | End: 2018-01-05

## 2018-01-05 RX ORDER — NALOXONE HYDROCHLORIDE 0.4 MG/ML
.1-.4 INJECTION, SOLUTION INTRAMUSCULAR; INTRAVENOUS; SUBCUTANEOUS
Status: DISCONTINUED | OUTPATIENT
Start: 2018-01-05 | End: 2018-01-05

## 2018-01-05 RX ORDER — NALOXONE HYDROCHLORIDE 0.4 MG/ML
.1-.4 INJECTION, SOLUTION INTRAMUSCULAR; INTRAVENOUS; SUBCUTANEOUS
Status: DISCONTINUED | OUTPATIENT
Start: 2018-01-05 | End: 2018-01-07 | Stop reason: HOSPADM

## 2018-01-05 RX ORDER — PROCHLORPERAZINE MALEATE 5 MG
10 TABLET ORAL EVERY 6 HOURS PRN
Status: DISCONTINUED | OUTPATIENT
Start: 2018-01-05 | End: 2018-01-07 | Stop reason: HOSPADM

## 2018-01-05 RX ORDER — DEXAMETHASONE SODIUM PHOSPHATE 4 MG/ML
INJECTION, SOLUTION INTRA-ARTICULAR; INTRALESIONAL; INTRAMUSCULAR; INTRAVENOUS; SOFT TISSUE PRN
Status: DISCONTINUED | OUTPATIENT
Start: 2018-01-05 | End: 2018-01-05

## 2018-01-05 RX ORDER — SODIUM CHLORIDE, SODIUM LACTATE, POTASSIUM CHLORIDE, CALCIUM CHLORIDE 600; 310; 30; 20 MG/100ML; MG/100ML; MG/100ML; MG/100ML
INJECTION, SOLUTION INTRAVENOUS CONTINUOUS
Status: DISCONTINUED | OUTPATIENT
Start: 2018-01-05 | End: 2018-01-05 | Stop reason: HOSPADM

## 2018-01-05 RX ORDER — NICOTINE POLACRILEX 4 MG
15-30 LOZENGE BUCCAL
Status: DISCONTINUED | OUTPATIENT
Start: 2018-01-05 | End: 2018-01-07 | Stop reason: HOSPADM

## 2018-01-05 RX ORDER — PREDNISONE 5 MG/1
5 TABLET ORAL DAILY
Status: DISCONTINUED | OUTPATIENT
Start: 2018-01-06 | End: 2018-01-07 | Stop reason: HOSPADM

## 2018-01-05 RX ORDER — ONDANSETRON 2 MG/ML
4 INJECTION INTRAMUSCULAR; INTRAVENOUS EVERY 30 MIN PRN
Status: DISCONTINUED | OUTPATIENT
Start: 2018-01-05 | End: 2018-01-05 | Stop reason: HOSPADM

## 2018-01-05 RX ORDER — MEPERIDINE HYDROCHLORIDE 50 MG/ML
12.5 INJECTION INTRAMUSCULAR; INTRAVENOUS; SUBCUTANEOUS
Status: DISCONTINUED | OUTPATIENT
Start: 2018-01-05 | End: 2018-01-05 | Stop reason: HOSPADM

## 2018-01-05 RX ORDER — METOCLOPRAMIDE HYDROCHLORIDE 5 MG/ML
10 INJECTION INTRAMUSCULAR; INTRAVENOUS EVERY 6 HOURS
Status: DISCONTINUED | OUTPATIENT
Start: 2018-01-05 | End: 2018-01-07 | Stop reason: HOSPADM

## 2018-01-05 RX ORDER — FENTANYL CITRATE 50 UG/ML
25-50 INJECTION, SOLUTION INTRAMUSCULAR; INTRAVENOUS
Status: DISCONTINUED | OUTPATIENT
Start: 2018-01-05 | End: 2018-01-05 | Stop reason: HOSPADM

## 2018-01-05 RX ORDER — HEPARIN SODIUM 5000 [USP'U]/.5ML
5000 INJECTION, SOLUTION INTRAVENOUS; SUBCUTANEOUS ONCE
Status: COMPLETED | OUTPATIENT
Start: 2018-01-05 | End: 2018-01-05

## 2018-01-05 RX ORDER — LIDOCAINE HYDROCHLORIDE 20 MG/ML
INJECTION, SOLUTION INFILTRATION; PERINEURAL PRN
Status: DISCONTINUED | OUTPATIENT
Start: 2018-01-05 | End: 2018-01-05

## 2018-01-05 RX ORDER — BUPIVACAINE HYDROCHLORIDE 2.5 MG/ML
INJECTION, SOLUTION EPIDURAL; INFILTRATION; INTRACAUDAL PRN
Status: DISCONTINUED | OUTPATIENT
Start: 2018-01-05 | End: 2018-01-05

## 2018-01-05 RX ORDER — SODIUM CHLORIDE, SODIUM LACTATE, POTASSIUM CHLORIDE, CALCIUM CHLORIDE 600; 310; 30; 20 MG/100ML; MG/100ML; MG/100ML; MG/100ML
INJECTION, SOLUTION INTRAVENOUS CONTINUOUS
Status: DISCONTINUED | OUTPATIENT
Start: 2018-01-05 | End: 2018-01-07

## 2018-01-05 RX ORDER — FLUMAZENIL 0.1 MG/ML
0.2 INJECTION, SOLUTION INTRAVENOUS
Status: DISCONTINUED | OUTPATIENT
Start: 2018-01-05 | End: 2018-01-05 | Stop reason: HOSPADM

## 2018-01-05 RX ORDER — EPHEDRINE SULFATE 50 MG/ML
INJECTION, SOLUTION INTRAMUSCULAR; INTRAVENOUS; SUBCUTANEOUS PRN
Status: DISCONTINUED | OUTPATIENT
Start: 2018-01-05 | End: 2018-01-05

## 2018-01-05 RX ORDER — NALOXONE HYDROCHLORIDE 0.4 MG/ML
.1-.4 INJECTION, SOLUTION INTRAMUSCULAR; INTRAVENOUS; SUBCUTANEOUS
Status: DISCONTINUED | OUTPATIENT
Start: 2018-01-05 | End: 2018-01-05 | Stop reason: HOSPADM

## 2018-01-05 RX ORDER — FENTANYL CITRATE 50 UG/ML
25-50 INJECTION, SOLUTION INTRAMUSCULAR; INTRAVENOUS EVERY 5 MIN PRN
Status: DISCONTINUED | OUTPATIENT
Start: 2018-01-05 | End: 2018-01-05

## 2018-01-05 RX ORDER — ERTAPENEM 1 G/1
1 INJECTION, POWDER, LYOPHILIZED, FOR SOLUTION INTRAMUSCULAR; INTRAVENOUS
Status: COMPLETED | OUTPATIENT
Start: 2018-01-05 | End: 2018-01-05

## 2018-01-05 RX ORDER — ONDANSETRON 4 MG/1
4 TABLET, ORALLY DISINTEGRATING ORAL EVERY 6 HOURS PRN
Status: DISCONTINUED | OUTPATIENT
Start: 2018-01-05 | End: 2018-01-07 | Stop reason: HOSPADM

## 2018-01-05 RX ORDER — SODIUM CHLORIDE, SODIUM LACTATE, POTASSIUM CHLORIDE, CALCIUM CHLORIDE 600; 310; 30; 20 MG/100ML; MG/100ML; MG/100ML; MG/100ML
INJECTION, SOLUTION INTRAVENOUS CONTINUOUS
Status: DISCONTINUED | OUTPATIENT
Start: 2018-01-05 | End: 2018-01-05

## 2018-01-05 RX ORDER — ACETAMINOPHEN 325 MG/1
975 TABLET ORAL ONCE
Status: DISCONTINUED | OUTPATIENT
Start: 2018-01-05 | End: 2018-01-05 | Stop reason: HOSPADM

## 2018-01-05 RX ORDER — GABAPENTIN 300 MG/1
300 CAPSULE ORAL 3 TIMES DAILY
Status: DISCONTINUED | OUTPATIENT
Start: 2018-01-05 | End: 2018-01-07 | Stop reason: HOSPADM

## 2018-01-05 RX ORDER — AMLODIPINE BESYLATE 10 MG/1
10 TABLET ORAL DAILY
Status: DISCONTINUED | OUTPATIENT
Start: 2018-01-06 | End: 2018-01-07 | Stop reason: HOSPADM

## 2018-01-05 RX ORDER — ONDANSETRON 4 MG/1
4 TABLET, ORALLY DISINTEGRATING ORAL EVERY 30 MIN PRN
Status: DISCONTINUED | OUTPATIENT
Start: 2018-01-05 | End: 2018-01-05 | Stop reason: HOSPADM

## 2018-01-05 RX ORDER — TACROLIMUS 1 MG/1
1 CAPSULE ORAL 2 TIMES DAILY
Status: DISCONTINUED | OUTPATIENT
Start: 2018-01-05 | End: 2018-01-06

## 2018-01-05 RX ORDER — FENTANYL CITRATE 50 UG/ML
25-50 INJECTION, SOLUTION INTRAMUSCULAR; INTRAVENOUS EVERY 5 MIN PRN
Status: DISCONTINUED | OUTPATIENT
Start: 2018-01-05 | End: 2018-01-05 | Stop reason: HOSPADM

## 2018-01-05 RX ORDER — LOPERAMIDE HCL 2 MG
2 CAPSULE ORAL 2 TIMES DAILY PRN
Status: DISCONTINUED | OUTPATIENT
Start: 2018-01-05 | End: 2018-01-06

## 2018-01-05 RX ORDER — DEXTROSE MONOHYDRATE 25 G/50ML
25-50 INJECTION, SOLUTION INTRAVENOUS
Status: DISCONTINUED | OUTPATIENT
Start: 2018-01-05 | End: 2018-01-07 | Stop reason: HOSPADM

## 2018-01-05 RX ORDER — ERTAPENEM 1 G/1
1 INJECTION, POWDER, LYOPHILIZED, FOR SOLUTION INTRAMUSCULAR; INTRAVENOUS SEE ADMIN INSTRUCTIONS
Status: DISCONTINUED | OUTPATIENT
Start: 2018-01-05 | End: 2018-01-05 | Stop reason: HOSPADM

## 2018-01-05 RX ORDER — PROPOFOL 10 MG/ML
INJECTION, EMULSION INTRAVENOUS PRN
Status: DISCONTINUED | OUTPATIENT
Start: 2018-01-05 | End: 2018-01-05

## 2018-01-05 RX ORDER — FLUDROCORTISONE ACETATE 0.1 MG/1
0.3 TABLET ORAL DAILY
Status: DISCONTINUED | OUTPATIENT
Start: 2018-01-06 | End: 2018-01-07 | Stop reason: HOSPADM

## 2018-01-05 RX ORDER — SODIUM BICARBONATE 650 MG/1
1300 TABLET ORAL 3 TIMES DAILY
Status: DISCONTINUED | OUTPATIENT
Start: 2018-01-05 | End: 2018-01-07 | Stop reason: HOSPADM

## 2018-01-05 RX ORDER — KETAMINE HYDROCHLORIDE 10 MG/ML
INJECTION, SOLUTION INTRAMUSCULAR; INTRAVENOUS PRN
Status: DISCONTINUED | OUTPATIENT
Start: 2018-01-05 | End: 2018-01-05

## 2018-01-05 RX ADMIN — KETAMINE HCL-NACL SOLN PREF SY 50 MG/5ML-0.9% (10MG/ML) 30 MG: 10 SOLUTION PREFILLED SYRINGE at 09:45

## 2018-01-05 RX ADMIN — Medication: at 14:35

## 2018-01-05 RX ADMIN — PHENYLEPHRINE HYDROCHLORIDE 100 MCG: 10 INJECTION, SOLUTION INTRAMUSCULAR; INTRAVENOUS; SUBCUTANEOUS at 10:05

## 2018-01-05 RX ADMIN — MYCOPHENOLIC ACID 720 MG: 360 TABLET, DELAYED RELEASE ORAL at 20:50

## 2018-01-05 RX ADMIN — SODIUM CHLORIDE, POTASSIUM CHLORIDE, SODIUM LACTATE AND CALCIUM CHLORIDE: 600; 310; 30; 20 INJECTION, SOLUTION INTRAVENOUS at 09:36

## 2018-01-05 RX ADMIN — ROCURONIUM BROMIDE 50 MG: 10 INJECTION INTRAVENOUS at 09:45

## 2018-01-05 RX ADMIN — HEPARIN SODIUM 5000 UNITS: 5000 INJECTION, SOLUTION INTRAVENOUS; SUBCUTANEOUS at 09:11

## 2018-01-05 RX ADMIN — ERTAPENEM SODIUM 1 G: 1 INJECTION, POWDER, LYOPHILIZED, FOR SOLUTION INTRAMUSCULAR; INTRAVENOUS at 10:00

## 2018-01-05 RX ADMIN — SUGAMMADEX 200 MG: 100 INJECTION, SOLUTION INTRAVENOUS at 12:05

## 2018-01-05 RX ADMIN — ONDANSETRON 4 MG: 2 INJECTION INTRAMUSCULAR; INTRAVENOUS at 13:23

## 2018-01-05 RX ADMIN — BUPIVACAINE HYDROCHLORIDE 20 ML: 2.5 INJECTION, SOLUTION EPIDURAL; INFILTRATION; INTRACAUDAL at 10:04

## 2018-01-05 RX ADMIN — GABAPENTIN 300 MG: 300 CAPSULE ORAL at 20:50

## 2018-01-05 RX ADMIN — SODIUM BICARBONATE 1300 MG: 650 TABLET ORAL at 20:50

## 2018-01-05 RX ADMIN — PHENYLEPHRINE HYDROCHLORIDE 100 MCG: 10 INJECTION, SOLUTION INTRAMUSCULAR; INTRAVENOUS; SUBCUTANEOUS at 10:13

## 2018-01-05 RX ADMIN — KETAMINE HCL-NACL SOLN PREF SY 50 MG/5ML-0.9% (10MG/ML) 10 MG: 10 SOLUTION PREFILLED SYRINGE at 12:08

## 2018-01-05 RX ADMIN — Medication 5 MG: at 10:10

## 2018-01-05 RX ADMIN — GABAPENTIN 300 MG: 300 CAPSULE ORAL at 16:23

## 2018-01-05 RX ADMIN — FAMOTIDINE 20 MG: 10 INJECTION INTRAVENOUS at 13:25

## 2018-01-05 RX ADMIN — ROCURONIUM BROMIDE 10 MG: 10 INJECTION INTRAVENOUS at 11:00

## 2018-01-05 RX ADMIN — SODIUM BICARBONATE 1300 MG: 650 TABLET ORAL at 16:23

## 2018-01-05 RX ADMIN — METOCLOPRAMIDE 10 MG: 5 INJECTION, SOLUTION INTRAMUSCULAR; INTRAVENOUS at 18:02

## 2018-01-05 RX ADMIN — FENTANYL CITRATE 25 MCG: 50 INJECTION INTRAMUSCULAR; INTRAVENOUS at 13:32

## 2018-01-05 RX ADMIN — METOCLOPRAMIDE 10 MG: 5 INJECTION, SOLUTION INTRAMUSCULAR; INTRAVENOUS at 12:25

## 2018-01-05 RX ADMIN — BUPIVACAINE 20 ML: 13.3 INJECTION, SUSPENSION, LIPOSOMAL INFILTRATION at 10:04

## 2018-01-05 RX ADMIN — FENTANYL CITRATE 50 MCG: 50 INJECTION, SOLUTION INTRAMUSCULAR; INTRAVENOUS at 12:08

## 2018-01-05 RX ADMIN — KETAMINE HCL-NACL SOLN PREF SY 50 MG/5ML-0.9% (10MG/ML) 10 MG: 10 SOLUTION PREFILLED SYRINGE at 11:24

## 2018-01-05 RX ADMIN — PHENYLEPHRINE HYDROCHLORIDE 100 MCG: 10 INJECTION, SOLUTION INTRAMUSCULAR; INTRAVENOUS; SUBCUTANEOUS at 10:09

## 2018-01-05 RX ADMIN — TACROLIMUS 1 MG: 1 CAPSULE ORAL at 18:02

## 2018-01-05 RX ADMIN — ONDANSETRON 4 MG: 2 INJECTION INTRAMUSCULAR; INTRAVENOUS at 09:50

## 2018-01-05 RX ADMIN — LIDOCAINE HYDROCHLORIDE 100 MG: 20 INJECTION, SOLUTION INFILTRATION; PERINEURAL at 09:45

## 2018-01-05 RX ADMIN — PROPOFOL 100 MG: 10 INJECTION, EMULSION INTRAVENOUS at 09:45

## 2018-01-05 RX ADMIN — MIDAZOLAM 2 MG: 1 INJECTION INTRAMUSCULAR; INTRAVENOUS at 09:36

## 2018-01-05 RX ADMIN — HYDROMORPHONE HYDROCHLORIDE 0.3 MG: 1 INJECTION, SOLUTION INTRAMUSCULAR; INTRAVENOUS; SUBCUTANEOUS at 13:56

## 2018-01-05 RX ADMIN — Medication 5 MG: at 10:03

## 2018-01-05 RX ADMIN — FENTANYL CITRATE 100 MCG: 50 INJECTION, SOLUTION INTRAMUSCULAR; INTRAVENOUS at 09:45

## 2018-01-05 RX ADMIN — ROCURONIUM BROMIDE 20 MG: 10 INJECTION INTRAVENOUS at 10:25

## 2018-01-05 RX ADMIN — SODIUM CHLORIDE, POTASSIUM CHLORIDE, SODIUM LACTATE AND CALCIUM CHLORIDE: 600; 310; 30; 20 INJECTION, SOLUTION INTRAVENOUS at 13:50

## 2018-01-05 RX ADMIN — DEXAMETHASONE SODIUM PHOSPHATE 4 MG: 4 INJECTION, SOLUTION INTRA-ARTICULAR; INTRALESIONAL; INTRAMUSCULAR; INTRAVENOUS; SOFT TISSUE at 09:50

## 2018-01-05 ASSESSMENT — LIFESTYLE VARIABLES: TOBACCO_USE: 1

## 2018-01-05 ASSESSMENT — PAIN DESCRIPTION - DESCRIPTORS: DESCRIPTORS: DULL;ACHING

## 2018-01-05 NOTE — OR NURSING
Pt meeting phase one completion criteria @ 1355.  Pt now alert and oriented times 4.  Rating pain  In abdomen as tolerable.  Lungs cont equal and clear bi lat.  Tolerating wean to nasal cannula.  Dressings to abdomen remain unchanged - scant amount shadowing.  Crowe remains intact - output quantity sufficient.  Nausea has improved after medicated - emesis and retching have resolved.  Blood glucose on arrival reviewed per CORINNE Mike - no treatment ordered.      Pt is a same day admit - no Auto Held medications to communicate  to receiving unit RN.  Blood glucose levels reported to accepting RN; next blood glucose check due as order states.       SBAR Hand off as recorded   .

## 2018-01-05 NOTE — ANESTHESIA PREPROCEDURE EVALUATION
Anesthesia Evaluation     . Pt has had prior anesthetic. Type: General    No history of anesthetic complications          ROS/MED HX    ENT/Pulmonary:     (+)RONNIE risk factors (Resolved after 200 lbs over last year.  ) tobacco use (remote), , . .    Neurologic:  - neg neurologic ROS     Cardiovascular:     (+) hypertension----. : . . . :. . Previous cardiac testing Echodate:results:date: results:ECG reviewed date: results: date: results:          METS/Exercise Tolerance: Comment: Walks dog 1-2 miles per day.  >4 METS   Hematologic:     (+) History of blood clots (s/p IVC filter in 2001) pt is not anticoagulated, Anemia, History of Transfusion no previous transfusion reaction -      Musculoskeletal: Comment: Gangrene right index finger, s/p debridement and flap 11/10/2017    Dislocated right wrist that required hardware ~2012.            GI/Hepatic: Comment: H/O stage IV liver cancer r/t CASTAÑEDA with subsequent cirrhosis s/p transplant 3/4/2017.  Received chemotherapy and radiation prior to transplant.     (+) GERD Asymptomatic on medication, hepatitis (CASTAÑEDA, Nonalcohlic steatohepatitis) liver disease (s/p Liver transplant),       Renal/Genitourinary: Comment: Resolving CKI    (+) chronic renal disease,       Endo:     (+) type I DM, Last HgA1c: 9.4 date: 2/2017 Using insulin Normal glucose range: BG  in mornings not previously admitted for DM/DKA .      Psychiatric:     (+) psychiatric history depression      Infectious Disease:  - neg infectious disease ROS       Malignancy:   (+) Malignancy History of Other  Other CA Remission status post Chemo and Radiation         Other:    (+) No chance of pregnancy C-spine cleared: N/A, no H/O Chronic Pain,no other significant disability                    Physical Exam  Normal systems: cardiovascular, pulmonary and dental    Airway   Mallampati: II  TM distance: >3 FB  Neck ROM: full    Dental     Cardiovascular   Rhythm and rate: regular and normal      Pulmonary                      Anesthesia Plan      History & Physical Review  History and physical reviewed and following examination; no interval change.    ASA Status:  3 .    NPO Status:  > 8 hours    Plan for General and ETT with Intravenous and Propofol induction. Maintenance will be Balanced.    PONV prophylaxis:  Ondansetron (or other 5HT-3) and Dexamethasone or Solumedrol  Additional equipment: 2nd IV      Postoperative Care  Postoperative pain management:  IV analgesics and Multi-modal analgesia.      Consents  Anesthetic plan, risks, benefits and alternatives discussed with:  Patient.  Use of blood products discussed: Yes.   Use of blood products discussed with Patient.  Consented to blood products.  .                          .

## 2018-01-05 NOTE — CONSULTS
General acute hospital, [place]  Immunosuppression Note:    Camacho Bhagat is a 52 year old male who is seen today  for immunosuppression management     I, Jovan Tran MD, I have examined the patient with the resident/PA/Fellow, discussed and agree with the note and findings.  I have reviewed today's vital signs, medications, labs and imaging. I reviewed the immunosuppression medications and levels. I spoke to the patient/family and explained below clinical details and answered all the questions      Transplant Surgery Consultation     Date of Admission:  1/5/2018    Assessment & Plan   Camacho Bhagat is a 52 year old male who was admitted on 1/5/2018. I was asked to see the patient for liver transplant immunosuppression.  -Continue home IS: Cellcept 720mg BID, Prednisone 5mg daily, FK 1mg BID  -Obtain daily FK levels.    Radha Osorio    Code Status    Full Code    Reason for Consult   Reason for consult: I was asked to evaluate this patient for immunosuppression.    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 He presents today following take down of end ileostomy and colorectal anastamosis.     Past Medical History   Past medical history reviewed with no previously diagnosed medical problems.    Past Surgical History   Past surgical history reviewed with no previous surgeries identified.    Prior to Admission Medications   Prior to Admission Medications   Prescriptions Last Dose Informant Patient Reported? Taking?   CIALIS 5 MG tablet More than a month at Unknown time  Yes No   Sig: Take 5 mg by mouth as needed    GABAPENTIN PO 1/5/2018 at 0545  Yes Yes   Sig: Take 300 mg by mouth 3 times daily   OMEPRAZOLE PO  1/5/2018 at 0545  Yes Yes   Sig: Take 20 mg by mouth every morning    amLODIPine (NORVASC) 10 MG tablet 1/5/2018 at 0545  No Yes   Sig: Take 1 tablet (10 mg) by mouth daily   Patient taking differently: Take 10 mg by mouth every morning    cholecalciferol (VITAMIN D3) 1000 UNIT tablet Past Week at Unknown time  No Yes   Sig: Take 1 tablet (1,000 Units) by mouth daily   cyclobenzaprine (FLEXERIL) 10 MG tablet Past Month at Unknown time Self No Yes   Sig: Take 1 tablet (10 mg) by mouth 3 times daily as needed for muscle spasms   fludrocortisone (FLORINEF) 0.1 MG tablet 1/5/2018 at 0545  No Yes   Sig: Take 3 tablets (0.3 mg) by mouth daily   furosemide (LASIX) 20 MG tablet 1/4/2018 at Unknown time  No Yes   Sig: Take 1 tablet (20 mg) by mouth 2 times daily   insulin glargine (LANTUS) 100 UNIT/ML injection 1/5/2018 at 0545  Yes Yes   Sig: Inject 50 Units Subcutaneous every morning   linagliptin (TRADJENTA) 5 MG TABS tablet 1/5/2018 at 0545  Yes Yes   Sig: Take 5 mg by mouth every evening    loperamide (IMODIUM) 2 MG capsule Past Month at Unknown time Self No Yes   Sig: Take 2 capsules (4 mg) by mouth 2 times daily   Patient taking differently: Take 2 mg by mouth 2 times daily as needed    magnesium oxide (MAG-OX) 400 (241.3 MG) MG tablet 1/4/2018 at Unknown time  No Yes   Sig: Take 1 tablet (400 mg) by mouth 2 times daily   Patient taking differently: Take 400 mg by mouth daily    mycophenolic acid (GENERIC EQUIVALENT) 360 MG EC tablet 1/5/2018 at 0545  No Yes   Sig: Take 2 tablets (720 mg) by mouth every 12 hours   oxyCODONE IR (ROXICODONE) 10 MG tablet Past Month at Unknown time  No Yes   Sig: Take 1 tablet (10 mg) by mouth daily as needed for moderate to severe pain   patiromer (VELTASSA) 8.4 G packet 1/4/2018 at Unknown time  No Yes   Sig: Take 8.4 g by mouth daily   predniSONE (DELTASONE) 5 MG tablet 1/5/2018 at 0545  No Yes   Sig: Take 1 tablet (5 mg) by mouth daily   sodium bicarbonate 650 MG tablet 1/5/2018 at  0545  No Yes   Sig: Take 2 tablets (1,300 mg) by mouth 3 times daily   tacrolimus (GENERIC EQUIVALENT) 1 MG capsule 1/5/2018 at 0545  No Yes   Sig: Take 1 capsule (1 mg) by mouth every 12 hours      Facility-Administered Medications: None     Allergies   Allergies   Allergen Reactions     Erythromycin GI Disturbance     Vioxx      Nausea, vomiting       Social History   I have reviewed this patient's social history and updated it with pertinent information if needed. Camacho Bhagat  reports that he quit smoking about 30 years ago. He has a 1.50 pack-year smoking history. He quit smokeless tobacco use about 2 years ago. His smokeless tobacco use included Chew. He reports that he does not drink alcohol or use illicit drugs.    Family History   I have reviewed this patient's family history and updated it with pertinent information if needed.   Family History   Problem Relation Age of Onset     DIABETES Father      Hypertension Father      Substance Abuse Father      Arthritis Mother      Thyroid Cancer Mother      Survivor!     Cervical Cancer Maternal Grandmother      CEREBROVASCULAR DISEASE Maternal Grandfather      Prostate Cancer Paternal Grandfather      Colon Cancer No family hx of      Hyperlipidemia No family hx of      Coronary Artery Disease No family hx of      Breast Cancer No family hx of        Review of Systems   The 5 point Review of Systems is negative other than noted in the HPI or here.     Physical Exam   Temp: 97  F (36.1  C) Temp src: Oral BP: 139/71   Heart Rate: 75 Resp: 16 SpO2: 97 % O2 Device: Nasal cannula Oxygen Delivery: 2 LPM  Vital Signs with Ranges  Temp:  [97  F (36.1  C)-98.4  F (36.9  C)] 97  F (36.1  C)  Heart Rate:  [65-75] 75  Resp:  [12-18] 16  BP: (126-167)/(70-90) 139/71  SpO2:  [97 %-100 %] 97 %  215 lbs 9.76 oz    Constitutional: Awake, alert, cooperative, no apparent distress, and appears stated age.  Respiratory: No increased work of breathing, good air exchange, clear to  auscultation bilaterally, no crackles or wheezing.  Cardiovascular: Normal apical impulse, regular rate and rhythm, normal S1 and S2, no S3 or S4, and no murmur noted.  GI: surgical scars well approximated, surgical dressings with small amount of s/s strikethrough  Lymph/Hematologic: No cervical lymphadenopathy and no supraclavicular lymphadenopathy.  Genitourinary:  Crowe with celestin urine output  Neurologic: Awake, alert, oriented to name, place and time.  Cranial nerves II-XII are grossly intact.  Motor is 5 out of 5 bilaterally.  Neuropsychiatric: Calm, normal eye contact, alert, normal affect, oriented to self, place, time and situation, memory for past and recent events intact and thought process normal.    Data   Most Recent 3 CBC's:  Recent Labs   Lab Test  01/05/18   0833  01/02/18   1108  12/20/17   1648   WBC  5.7  4.9  5.3   HGB  8.9*  9.4*  9.3*   MCV  93  95  95   PLT  71*  83*  73*     Most Recent 3 BMP's:  Recent Labs   Lab Test  01/05/18   0833  01/02/18   1108  12/20/17   1648   NA  139  140  137   POTASSIUM  4.1  4.9  4.4   CHLORIDE  106  106  104   CO2  25  29  28   BUN  23  24  19   CR  1.06  1.20  1.26*   ANIONGAP  8  6  5   JACOB  8.4*  7.9*  8.1*   GLC  94  189*  157*     Most Recent 2 LFT's:  Recent Labs   Lab Test  01/02/18   1108  12/20/17   1648   AST  26  21   ALT  22  20   ALKPHOS  123  120   BILITOTAL  0.5  0.3     Most Recent 3 INR's:  Recent Labs   Lab Test  01/02/18   1108  12/20/17   1648  08/24/17   1406   INR  0.99  0.96  1.12

## 2018-01-05 NOTE — IP AVS SNAPSHOT
[address] [address]    Phone:  [phone]                                       After Visit Summary   1/5/2018    Camacho Bhagat    MRN: [id number]           After Visit Summary Signature Page     I have received my discharge instructions, and my questions have been answered. I have discussed any challenges I see with this plan with the nurse or doctor.    ..........................................................................................................................................  Patient/Patient Representative Signature      ..........................................................................................................................................  Patient Representative Print Name and Relationship to Patient    ..................................................               ................................................  Date                                            Time    ..........................................................................................................................................  Reviewed by Signature/Title    ...................................................              ..............................................  Date                                                            Time

## 2018-01-05 NOTE — OR NURSING
PCA placed as per order.  PCA programmed at rate per surgeon order.  No PCA bolus given as per PACU policy dictates.  SBAR Hand off report to  Dora Carlisle RN 7B.  PCA verified by additional RN Alisa Hernandez before starting.

## 2018-01-05 NOTE — IP AVS SNAPSHOT
MRN:[id number]                      After Visit Summary   1/5/2018    Camacho Bhagat    MRN: [id number]           Thank you!     Thank you for choosing [facility] for your care. Our goal is always to provide you with excellent care. Hearing back from our patients is one way we can continue to improve our services. Please take a few minutes to complete the written survey that you may receive in the mail after you visit with us. Thank you!        Patient Information     Date Of Birth          1965        About your hospital stay     You were admitted on:  January 5, 2018 You last received care in the:  [address] [facility]    You were discharged on:  January 7, 2018        Reason for your hospital stay       Ileostomy status                  Who to Call     For medical emergencies, please call 911.  For non-urgent questions about your medical care, please call your primary care provider or clinic, [phone]  For questions related to your surgery, please call your surgery clinic        Attending Provider     Provider Specialty    Sara Dinh MD Colon and Rectal Surgery       Primary Care Provider Office Phone # Fax #    Shay Kirkpatrick [phone]      After Care Instructions     Activity       Your activity upon discharge: activity as tolerated            Diet       Follow this diet upon discharge: Low fiber            Discharge Instructions       DIET  -Low Residue Diet for at least 4-6 weeks unless cleared by Colorectal surgery.  No raw vegetables, fruit skins, fibrous foods that require a lot of chewing, nuts, seeds, corn, popcorn.   -We recommend eating slowly, chewing thoroughly, eating small frequent meals throughout the day  -Stay well hydrated.      ACTIVITY  -No lifting, pushing, pulling greater than 10 lbs and no strenuous exercise for 6 weeks   -No driving while on narcotic analgesics (i.e. Percocet, oxycodone, Vicodin)  -No driving until you are able  to fully twist to both sides or slam on brakes quickly and without any pain    WOUND CLINIC  -Inspect your wounds daily for signs of infection (increased redness, drainage, pain)  -Keep your wound clean and dry  -You may shower, but do not soak in tub or pool    NOTIFY  Please contact Blayne Vasquez LPN, Ragini Mays RN at [phone] for problems after discharge such as:  -Temperature > 101F, chills, rigors, dizziness  -Redness around or purulent drainage from wound  -Inability to tolerate diet, nausea or vomiting  -You stop passing gas, develop significant bloating, abdominal pain  -Have blood in stools/vomit  -Have severe diarrhea/constipation  -Any other questions or concerns.  - At nights (after 4:30pm), on weekends, or if urgent, call [phone] and ask the  to speak with the on-call Colorectal Surgery resident or fellow      Medication Instructions  Some of your medications may have changed. Please take only prescribed and resumed medications     FOLLOW-UP  1.  You will need to follow-up with Caryn Busch NP in the Colon and Rectal Surgery clinic in 1 week(s) and then with Dr Dinh in 2-3 weeks after.  Please contact our clinic scheduler Cassandra Foley (phone # [phone]) if you have not heard from our clinic in 3 business days afer discharge to schedule a follow-up appointment. You will also need follow up in 2 weeks with Dr. Tran.    2.  Follow up with your primary care provider in 1-2 weeks after discharge from the hospital to review this hospitalization.            Supplies       Dressing change supplies            Wound care and dressings       You may wash your wounds with soap and water. You can use gauze and tegaderms to cover your old ostomy site for the next few days. Do not bathe. Your staples should stay in place until you are seen by Caryn.                  Your next 10 appointments already scheduled     Jan 16, 2018 11:00 AM CST   (Arrive by 10:45 AM)  "  Return Vascular Visit with LAMIN Lopez   [facility] ([facility])    [address]  3rd Floor  [place] [address]   [phone]            Jan 31, 2018  1:00 PM CST   (Arrive by 12:30 PM)   Return Visit with Cinda Cazares NP   [facility] ([facility])    [address]  [address]  [place] [facility] [address]   [phone]            Mar 02, 2018 10:45 AM CST   (Arrive by 10:30 AM)   Return Liver Transplant with Toñito Camejo MD   [facility] ([facility])    [address]  [address]  [place] [facility] [address]   [phone]              Pending Results     Date and Time Order Name Status Description    1/7/2018 0835 CMV DNA quantification In process     1/7/2018 0815 Tacrolimus level In process     1/5/2018 1105 Surgical pathology exam In process             Statement of Approval     Ordered          01/07/18 1025  I have reviewed and agree with all the recommendations and orders detailed in this document.  EFFECTIVE NOW     Approved and electronically signed by:  Taco Markham MD           01/07/18 1029  I have reviewed and agree with all the recommendations and orders detailed in this document.  EFFECTIVE NOW     Approved and electronically signed by:  Taco Markham MD             Admission Information     Date & Time Provider Department Dept. Phone    1/5/2018 Sara Dinh MD [address] [facility] [phone]      Your Vitals Were     Blood Pressure Temperature Respirations Height Weight Pulse Oximetry    157/71 (BP Location: Right arm) 99.4  F (37.4  C) (Oral) 16 1.829 m (6' 0.01\") 97.8 kg (215 lb 9.8 oz) 96%    BMI (Body Mass Index)                   29.24 kg/m2           MyChart Information     [employer] gives you secure access to your electronic health record. If you see a primary care provider, you can also send messages to your care " team and make appointments. If you have questions, please call your primary care clinic.  If you do not have a primary care provider, please call [phone] and they will assist you.        Care EveryWhere ID     This is your Care EveryWhere ID. This could be used by other organizations to access your [facility] medical records  [id number]        Equal Access to Services     FRANKLIN PORTILLO : Hadrere swartz haddave Sojose, waaxda luqadaha, qaybta kaalmada magdaleno, devi craig . So [facility] [phone].    ATENCIÓN: Si habla español, tiene a zheng disposición servicios gratuitos de asistencia lingüística. Shahla al [phone].    We comply with applicable federal civil rights laws and [place] laws. We do not discriminate on the basis of race, color, national origin, age, disability, sex, sexual orientation, or gender identity.               Review of your medicines      START taking        Dose / Directions    polyethylene glycol powder   Commonly known as:  MIRALAX        Dose:  1 capful   Take 17 g (1 capful) by mouth daily   Quantity:  510 g   Refills:  1         CONTINUE these medicines which may have CHANGED, or have new prescriptions. If we are uncertain of the size of tablets/capsules you have at home, strength may be listed as something that might have changed.        Dose / Directions    amLODIPine 10 MG tablet   Commonly known as:  NORVASC   This may have changed:  when to take this   Used for:  HTN (hypertension)        Dose:  10 mg   Take 1 tablet (10 mg) by mouth daily   Quantity:  90 tablet   Refills:  3       loperamide 2 MG capsule   Commonly known as:  IMODIUM   This may have changed:    - how much to take  - when to take this  - reasons to take this   Used for:  S/P right hemicolectomy        Dose:  4 mg   Take 2 capsules (4 mg) by mouth 2 times daily   Quantity:  120 capsule   Refills:  3       magnesium oxide 400 (241.3 MG) MG tablet   Commonly known as:  MAG-OX   This  may have changed:  when to take this   Used for:  Hypomagnesemia, CKD (chronic kidney disease) stage 3, GFR 30-59 ml/min        Dose:  400 mg   Take 1 tablet (400 mg) by mouth 2 times daily   Quantity:  180 tablet   Refills:  3       * oxyCODONE IR 10 MG tablet   Commonly known as:  ROXICODONE   This may have changed:  Another medication with the same name was added. Make sure you understand how and when to take each.   Used for:  Osteoarthritis        Dose:  10 mg   Take 1 tablet (10 mg) by mouth daily as needed for moderate to severe pain   Quantity:  30 tablet   Refills:  0       * oxyCODONE IR 5 MG tablet   Commonly known as:  ROXICODONE   This may have changed:  You were already taking a medication with the same name, and this prescription was added. Make sure you understand how and when to take each.        Dose:  5 mg   Take 1 tablet (5 mg) by mouth every 4 hours as needed for pain maximum 6 tablet(s) per day   Quantity:  30 tablet   Refills:  0       * Notice:  This list has 2 medication(s) that are the same as other medications prescribed for you. Read the directions carefully, and ask your doctor or other care provider to review them with you.      CONTINUE these medicines which have NOT CHANGED        Dose / Directions    cholecalciferol 1000 UNIT tablet   Commonly known as:  vitamin D3   Used for:  Vitamin D deficiency        Dose:  1000 Units   Take 1 tablet (1,000 Units) by mouth daily   Quantity:  100 tablet   Refills:  3       CIALIS 5 MG tablet   Generic drug:  tadalafil        Dose:  5 mg   Take 5 mg by mouth as needed   Refills:  1       cyclobenzaprine 10 MG tablet   Commonly known as:  FLEXERIL   Used for:  Immunosuppression (H)        Dose:  10 mg   Take 1 tablet (10 mg) by mouth 3 times daily as needed for muscle spasms   Quantity:  90 tablet   Refills:  0       fludrocortisone 0.1 MG tablet   Commonly known as:  FLORINEF   Used for:  Hyperkalemia        Dose:  0.3 mg   Take 3 tablets (0.3 mg)  by mouth daily   Quantity:  90 tablet   Refills:  3       furosemide 20 MG tablet   Commonly known as:  LASIX   Used for:  Hyperkalemia        Dose:  20 mg   Take 1 tablet (20 mg) by mouth 2 times daily   Quantity:  60 tablet   Refills:  3       GABAPENTIN PO        Dose:  300 mg   Take 300 mg by mouth 3 times daily   Refills:  0       insulin glargine 100 UNIT/ML injection   Commonly known as:  LANTUS        Dose:  50 Units   Inject 50 Units Subcutaneous every morning   Refills:  0       linagliptin 5 MG Tabs tablet   Commonly known as:  TRADJENTA        Dose:  5 mg   Take 5 mg by mouth every evening   Refills:  0       mycophenolic acid 360 MG EC tablet   Commonly known as:  GENERIC EQUIVALENT   Used for:  History of liver transplant (H), Long-term use of immunosuppressant medication        Dose:  720 mg   Take 2 tablets (720 mg) by mouth every 12 hours   Quantity:  120 tablet   Refills:  3       OMEPRAZOLE PO        Dose:  20 mg   Take 20 mg by mouth every morning   Refills:  0       patiromer 8.4 G packet   Commonly known as:  VELTASSA   Used for:  Hyperkalemia        Dose:  8.4 g   Take 8.4 g by mouth daily   Quantity:  30 each   Refills:  3       predniSONE 5 MG tablet   Commonly known as:  DELTASONE   Used for:  Liver transplant recipient (H), Long-term use of immunosuppressant medication        Dose:  5 mg   Take 1 tablet (5 mg) by mouth daily   Quantity:  30 tablet   Refills:  11       sodium bicarbonate 650 MG tablet   Used for:  Hyperkalemia        Dose:  1300 mg   Take 2 tablets (1,300 mg) by mouth 3 times daily   Quantity:  180 tablet   Refills:  3       tacrolimus 1 MG capsule   Commonly known as:  GENERIC EQUIVALENT   Used for:  Liver transplant recipient (H)        Dose:  1 mg   Take 1 capsule (1 mg) by mouth every 12 hours   Quantity:  60 capsule   Refills:  11            Where to get your medicines      Some of these will need a paper prescription and others can be bought over the counter. Ask your  nurse if you have questions.     Bring a paper prescription for each of these medications     oxyCODONE IR 5 MG tablet    polyethylene glycol powder                Protect others around you: Learn how to safely use, store and throw away your medicines at www.disposemymeds.org.             Medication List: This is a list of all your medications and when to take them. Check marks below indicate your daily home schedule. Keep this list as a reference.      Medications           Morning Afternoon Evening Bedtime As Needed    amLODIPine 10 MG tablet   Commonly known as:  NORVASC   Take 1 tablet (10 mg) by mouth daily   Last time this was given:  10 mg on 1/7/2018  8:34 AM                                cholecalciferol 1000 UNIT tablet   Commonly known as:  vitamin D3   Take 1 tablet (1,000 Units) by mouth daily                                CIALIS 5 MG tablet   Take 5 mg by mouth as needed   Generic drug:  tadalafil                                cyclobenzaprine 10 MG tablet   Commonly known as:  FLEXERIL   Take 1 tablet (10 mg) by mouth 3 times daily as needed for muscle spasms                                fludrocortisone 0.1 MG tablet   Commonly known as:  FLORINEF   Take 3 tablets (0.3 mg) by mouth daily   Last time this was given:  0.3 mg on 1/7/2018  8:34 AM                                furosemide 20 MG tablet   Commonly known as:  LASIX   Take 1 tablet (20 mg) by mouth 2 times daily   Last time this was given:  20 mg on 1/7/2018  8:34 AM                                GABAPENTIN PO   Take 300 mg by mouth 3 times daily   Last time this was given:  300 mg on 1/7/2018  8:34 AM                                insulin glargine 100 UNIT/ML injection   Commonly known as:  LANTUS   Inject 50 Units Subcutaneous every morning                                linagliptin 5 MG Tabs tablet   Commonly known as:  TRADJENTA   Take 5 mg by mouth every evening                                loperamide 2 MG capsule   Commonly known  as:  IMODIUM   Take 2 capsules (4 mg) by mouth 2 times daily                                magnesium oxide 400 (241.3 MG) MG tablet   Commonly known as:  MAG-OX   Take 1 tablet (400 mg) by mouth 2 times daily                                mycophenolic acid 360 MG EC tablet   Commonly known as:  GENERIC EQUIVALENT   Take 2 tablets (720 mg) by mouth every 12 hours   Last time this was given:  720 mg on 1/7/2018  8:34 AM                                OMEPRAZOLE PO   Take 20 mg by mouth every morning   Last time this was given:  20 mg on 1/7/2018  8:34 AM                                * oxyCODONE IR 10 MG tablet   Commonly known as:  ROXICODONE   Take 1 tablet (10 mg) by mouth daily as needed for moderate to severe pain                                * oxyCODONE IR 5 MG tablet   Commonly known as:  ROXICODONE   Take 1 tablet (5 mg) by mouth every 4 hours as needed for pain maximum 6 tablet(s) per day                                patiromer 8.4 G packet   Commonly known as:  VELTASSA   Take 8.4 g by mouth daily                                polyethylene glycol powder   Commonly known as:  MIRALAX   Take 17 g (1 capful) by mouth daily                                predniSONE 5 MG tablet   Commonly known as:  DELTASONE   Take 1 tablet (5 mg) by mouth daily   Last time this was given:  5 mg on 1/7/2018  8:34 AM                                sodium bicarbonate 650 MG tablet   Take 2 tablets (1,300 mg) by mouth 3 times daily   Last time this was given:  1,300 mg on 1/7/2018  8:34 AM                                tacrolimus 1 MG capsule   Commonly known as:  GENERIC EQUIVALENT   Take 1 capsule (1 mg) by mouth every 12 hours   Last time this was given:  1.5 mg on 1/7/2018  8:34 AM                                * Notice:  This list has 2 medication(s) that are the same as other medications prescribed for you. Read the directions carefully, and ask your doctor or other care provider to review them with you.

## 2018-01-05 NOTE — OR NURSING
Pt arrived to PACU.  PACU Rn assumed care of pt @ 5175.    Pt able to state name and deny pain at present time.  Pt arrived retching with reports of nausea - medicated on arrival with Reglan - per MDA order.  Will plan to give IV Pepcid when it arrives from pharmacy as per MDA order.  Lungs equal and clear bi lat; tolerating  Nasal cannula.  2 abdominal dressing with scant areas of shadowing - will cont to monitor dressings.  Crowe in place and draining.   See flowsheet.

## 2018-01-05 NOTE — ANESTHESIA PROCEDURE NOTES
Peripheral Nerve Block Procedure Note    Staff:     Anesthesiologist:  PEPE CAREY    Resident/CRNA:  NIEVES TILLEY    Block performed by resident/CRNA in the presence of a teaching physician    Location: OR AFTER induction  Procedure Start/Stop TImes:      1/5/2018 9:58 AM     1/5/2018 10:06 AM    patient identified, IV checked, site marked, risks and benefits discussed, informed consent, monitors and equipment checked, pre-op evaluation, at physician/surgeon's request and post-op pain management      Correct Patient: Yes      Correct Position: Yes      Correct Site: Yes      Correct Procedure: Yes      Correct Laterality:  Yes    Site Marked:  Yes  Procedure details:     Procedure:  TAP    ASA:  3    Diagnosis:  Post operative Pain    Laterality:  Bilateral    Position:  Supine    Sterile Prep: chloraprep, mask and sterile gloves      Local skin infiltration:  None    Needle:  Short bevel    Needle length (mm):  110    Ultrasound: Yes      Ultrasound used to identify targeted nerve, plexus, or vascular structure and placed a needle adjacent to it      Permanent Image entered into patiient's record      Abnormal pain on injection: No      Blood Aspirated: No      Paresthesias:  No    Bleeding at site: No      Bolus via:  Needle    Infusion Method:  Single Shot    Complications:  None  Assessment/Narrative:     Injection made incrementally with aspirations every (mL):  5

## 2018-01-05 NOTE — ANESTHESIA CARE TRANSFER NOTE
Patient: Camacho Bhagat    Procedure(s):  Exploratory Laparotomy, Lysis of Adhesions, Takedown Of End Ileostomy, Takedown of mucocutaneous fistula, ileocolic resection  And Colorectal Anastomosis, Primary repair of Ventral Hernia, Anesthesia Block  - Wound Class: II-Clean Contaminated    Diagnosis: Colitis   Diagnosis Additional Information: No value filed.    Anesthesia Type:   General, ETT     Note:  Airway :Face Mask  Patient transferred to:PACU  Comments: Pt. Pink and breathing spontaneously.  Vitals stable.  Pt nauseated - Sripada addressing nausea.  Report given to oncoming nurse.  Transfer of care occurred.Handoff Report: Identifed the Patient, Identified the Reponsible Provider, Reviewed the pertinent medical history, Discussed the surgical course, Reviewed Intra-OP anesthesia mangement and issues during anesthesia, Set expectations for post-procedure period and Allowed opportunity for questions and acknowledgement of understanding      Vitals: (Last set prior to Anesthesia Care Transfer)    CRNA VITALS  1/5/2018 1149 - 1/5/2018 1236      1/5/2018             Resp Rate (set): 10                Electronically Signed By: LAMIN Davis CRNA  January 5, 2018  12:36 PM

## 2018-01-05 NOTE — BRIEF OP NOTE
[facility], [place]    Brief Operative Note    Pre-operative diagnosis: Ileostomy and mucous fistula status  Post-operative diagnosis Same  Procedure: Procedure(s):  Exploratory Laparotomy, Lysis of Adhesions, Takedown Of End Ileostomy, Takedown of mucocutaneous fistula, ileocolic resection  And ileocolic end to side Anastomosis, Primary repair of Ventral Hernia, Anesthesia Block  - Wound Class: II-Clean Contaminated  Surgeon: Surgeon(s) and Role:     * Sara Dinh MD - Primary  Anesthesia: Combined General with Block   Estimated blood loss: Less than 50 ml  Drains: None  Specimens:   ID Type Source Tests Collected by Time Destination   A : ileostomy Tissue Other SURGICAL PATHOLOGY EXAM Sara Dinh MD 1/5/2018 11:05 AM    B : Transverse Colon Mucus Fistula Tissue Colon SURGICAL PATHOLOGY EXAM Sara Dinh MD 1/5/2018 11:22 AM      Findings:   Reducible Ventral hernia  Complications: None.  Implants: None.    --  Charles Markham MD  Gen Surg PGY1

## 2018-01-06 LAB
ALBUMIN SERPL-MCNC: 2.5 G/DL (ref 3.4–5)
ALP SERPL-CCNC: 72 U/L (ref 40–150)
ALT SERPL W P-5'-P-CCNC: 17 U/L (ref 0–70)
ANION GAP SERPL CALCULATED.3IONS-SCNC: 7 MMOL/L (ref 3–14)
AST SERPL W P-5'-P-CCNC: 14 U/L (ref 0–45)
BILIRUB DIRECT SERPL-MCNC: 0.1 MG/DL (ref 0–0.2)
BILIRUB SERPL-MCNC: 0.5 MG/DL (ref 0.2–1.3)
BUN SERPL-MCNC: 23 MG/DL (ref 7–30)
CALCIUM SERPL-MCNC: 7.9 MG/DL (ref 8.5–10.1)
CHLORIDE SERPL-SCNC: 111 MMOL/L (ref 94–109)
CO2 SERPL-SCNC: 27 MMOL/L (ref 20–32)
CREAT SERPL-MCNC: 1.4 MG/DL (ref 0.66–1.25)
ERYTHROCYTE [DISTWIDTH] IN BLOOD BY AUTOMATED COUNT: 15.9 % (ref 10–15)
GFR SERPL CREATININE-BSD FRML MDRD: 53 ML/MIN/1.7M2
GLUCOSE BLDC GLUCOMTR-MCNC: 123 MG/DL (ref 70–99)
GLUCOSE BLDC GLUCOMTR-MCNC: 126 MG/DL (ref 70–99)
GLUCOSE BLDC GLUCOMTR-MCNC: 158 MG/DL (ref 70–99)
GLUCOSE BLDC GLUCOMTR-MCNC: 73 MG/DL (ref 70–99)
GLUCOSE SERPL-MCNC: 71 MG/DL (ref 70–99)
HBA1C MFR BLD: 5.1 % (ref 4.3–6)
HCT VFR BLD AUTO: 28.8 % (ref 40–53)
HGB BLD-MCNC: 9.1 G/DL (ref 13.3–17.7)
MAGNESIUM SERPL-MCNC: 1.9 MG/DL (ref 1.6–2.3)
MCH RBC QN AUTO: 30.2 PG (ref 26.5–33)
MCHC RBC AUTO-ENTMCNC: 31.6 G/DL (ref 31.5–36.5)
MCV RBC AUTO: 96 FL (ref 78–100)
PHOSPHATE SERPL-MCNC: 3.6 MG/DL (ref 2.5–4.5)
PLATELET # BLD AUTO: 78 10E9/L (ref 150–450)
POTASSIUM SERPL-SCNC: 4.5 MMOL/L (ref 3.4–5.3)
PROT SERPL-MCNC: 5.6 G/DL (ref 6.8–8.8)
RBC # BLD AUTO: 3.01 10E12/L (ref 4.4–5.9)
SODIUM SERPL-SCNC: 145 MMOL/L (ref 133–144)
TACROLIMUS BLD-MCNC: <3 UG/L (ref 5–15)
TME LAST DOSE: ABNORMAL H
WBC # BLD AUTO: 6.2 10E9/L (ref 4–11)

## 2018-01-06 PROCEDURE — 25000131 ZZH RX MED GY IP 250 OP 636 PS 637: Performed by: STUDENT IN AN ORGANIZED HEALTH CARE EDUCATION/TRAINING PROGRAM

## 2018-01-06 PROCEDURE — 25000125 ZZHC RX 250: Performed by: STUDENT IN AN ORGANIZED HEALTH CARE EDUCATION/TRAINING PROGRAM

## 2018-01-06 PROCEDURE — 80048 BASIC METABOLIC PNL TOTAL CA: CPT | Performed by: STUDENT IN AN ORGANIZED HEALTH CARE EDUCATION/TRAINING PROGRAM

## 2018-01-06 PROCEDURE — 25000128 H RX IP 250 OP 636: Performed by: ANESTHESIOLOGY

## 2018-01-06 PROCEDURE — 85027 COMPLETE CBC AUTOMATED: CPT | Performed by: STUDENT IN AN ORGANIZED HEALTH CARE EDUCATION/TRAINING PROGRAM

## 2018-01-06 PROCEDURE — 36415 COLL VENOUS BLD VENIPUNCTURE: CPT | Performed by: STUDENT IN AN ORGANIZED HEALTH CARE EDUCATION/TRAINING PROGRAM

## 2018-01-06 PROCEDURE — 83036 HEMOGLOBIN GLYCOSYLATED A1C: CPT | Performed by: STUDENT IN AN ORGANIZED HEALTH CARE EDUCATION/TRAINING PROGRAM

## 2018-01-06 PROCEDURE — 00000146 ZZHCL STATISTIC GLUCOSE BY METER IP

## 2018-01-06 PROCEDURE — 12000008 ZZH R&B INTERMEDIATE UMMC

## 2018-01-06 PROCEDURE — 25000131 ZZH RX MED GY IP 250 OP 636 PS 637: Performed by: PHYSICIAN ASSISTANT

## 2018-01-06 PROCEDURE — 84100 ASSAY OF PHOSPHORUS: CPT | Performed by: STUDENT IN AN ORGANIZED HEALTH CARE EDUCATION/TRAINING PROGRAM

## 2018-01-06 PROCEDURE — 80197 ASSAY OF TACROLIMUS: CPT | Performed by: NURSE PRACTITIONER

## 2018-01-06 PROCEDURE — 25000132 ZZH RX MED GY IP 250 OP 250 PS 637: Performed by: STUDENT IN AN ORGANIZED HEALTH CARE EDUCATION/TRAINING PROGRAM

## 2018-01-06 PROCEDURE — 83735 ASSAY OF MAGNESIUM: CPT | Performed by: STUDENT IN AN ORGANIZED HEALTH CARE EDUCATION/TRAINING PROGRAM

## 2018-01-06 PROCEDURE — 80076 HEPATIC FUNCTION PANEL: CPT | Performed by: STUDENT IN AN ORGANIZED HEALTH CARE EDUCATION/TRAINING PROGRAM

## 2018-01-06 PROCEDURE — 25000128 H RX IP 250 OP 636: Performed by: STUDENT IN AN ORGANIZED HEALTH CARE EDUCATION/TRAINING PROGRAM

## 2018-01-06 RX ADMIN — SODIUM BICARBONATE 1300 MG: 650 TABLET ORAL at 13:46

## 2018-01-06 RX ADMIN — FLUDROCORTISONE ACETATE 0.3 MG: 0.1 TABLET ORAL at 08:06

## 2018-01-06 RX ADMIN — AMLODIPINE BESYLATE 10 MG: 10 TABLET ORAL at 08:06

## 2018-01-06 RX ADMIN — GABAPENTIN 300 MG: 300 CAPSULE ORAL at 13:46

## 2018-01-06 RX ADMIN — Medication: at 14:12

## 2018-01-06 RX ADMIN — TACROLIMUS 1.5 MG: 1 CAPSULE ORAL at 22:41

## 2018-01-06 RX ADMIN — GABAPENTIN 300 MG: 300 CAPSULE ORAL at 08:06

## 2018-01-06 RX ADMIN — METOCLOPRAMIDE 10 MG: 5 INJECTION, SOLUTION INTRAMUSCULAR; INTRAVENOUS at 18:18

## 2018-01-06 RX ADMIN — MYCOPHENOLIC ACID 720 MG: 360 TABLET, DELAYED RELEASE ORAL at 08:06

## 2018-01-06 RX ADMIN — SODIUM BICARBONATE 1300 MG: 650 TABLET ORAL at 08:05

## 2018-01-06 RX ADMIN — PREDNISONE 5 MG: 5 TABLET ORAL at 08:05

## 2018-01-06 RX ADMIN — GABAPENTIN 300 MG: 300 CAPSULE ORAL at 22:22

## 2018-01-06 RX ADMIN — TACROLIMUS 1 MG: 1 CAPSULE ORAL at 08:06

## 2018-01-06 RX ADMIN — SODIUM BICARBONATE 1300 MG: 650 TABLET ORAL at 22:23

## 2018-01-06 RX ADMIN — OMEPRAZOLE 20 MG: 20 CAPSULE, DELAYED RELEASE ORAL at 08:05

## 2018-01-06 RX ADMIN — METOCLOPRAMIDE 10 MG: 5 INJECTION, SOLUTION INTRAMUSCULAR; INTRAVENOUS at 13:46

## 2018-01-06 RX ADMIN — MYCOPHENOLIC ACID 720 MG: 360 TABLET, DELAYED RELEASE ORAL at 22:24

## 2018-01-06 RX ADMIN — METOCLOPRAMIDE 10 MG: 5 INJECTION, SOLUTION INTRAMUSCULAR; INTRAVENOUS at 06:08

## 2018-01-06 RX ADMIN — METOCLOPRAMIDE 10 MG: 5 INJECTION, SOLUTION INTRAMUSCULAR; INTRAVENOUS at 00:48

## 2018-01-06 RX ADMIN — ENOXAPARIN SODIUM 40 MG: 40 INJECTION SUBCUTANEOUS at 08:07

## 2018-01-06 NOTE — OP NOTE
SURGEON: Sara Dinh MD     ASSISTANT: Taoc Markham MD Surgery Resident    PREOPERATIVE DIAGNOSIS: Previous emergent right hemicolectomy with end ileostomy and mucous fistula in the setting of an acute abdomen. Desire for continuity.    POSTOPERATIVE DIAGNOSIS: Previous emergent right hemicolectomy with end ileostomy and mucous fistula in the setting of an acute abdomen. Desire for continuity. Moderate adhesions throughout the abdomen.     PROCEDURE: Exploratory laparotomy, lysis of adhesions, takedown of end ileostomy, takedown of mucocutaneous fistula, and ileocolic anastomosis, primary repair of ventral hernia.    INDICATIONS: Camacho Bhagat is a 52 year old male who underwent a liver transplant and after surgery developed gastroenterological problems and an acute abdomen with right colon involvement several days after a colonoscopy and a polypectomy. He underwent laparotomy and an extended right hemicolectomy with end ileostomy and mucous fistula in that setting. He has now recovered well and is here for laparotomy and reversal. The risks and benefits of surgery were thoroughly discussed with the patient and he agreed to proceed.     DESCRIPTION OF PROCEDURE: The patient was brought to the operating room, placed supine on the operating room table. General endotracheal anesthesia was  induced. We prepped and draped the abdomen in the usual sterile fashion.     We began the procedure with a timeout and incised at he midline and gained access to the abdominal cavity. There were moderate adhesions which were taken down sharply to separate small bowel from the abdominal wall. This was a slow and tedious dissection. The mucous fistula and the ileostomy just beyond the mucocutaneous junction were taken down. This was facilitated by the ongoing prolapse and hernia of the two areas. We took care to preserve both limbs of intestine. Eventually we gained access to the abdominal cavity and were able to take  down associated adhesions including the end ileostomy and the terminal ileum. Beyond the terminal ileum at about 20 cm, there was some adhesion of the mesentery in the right lower quadrant. We opted to take the dissection just a bit further and then to stop as this appeared to be severely adhered to the right lower quadrant retroperitoneum where the colectomy had been performed. The mesentery orientation was checked and there was sufficient length to easily accommodate an end to side anastomosis. A pursestring and anvil was placed into the terminal ileum after the mesentery was divided. The 28 EEA stapler was placed out the side of the transverse colon along the tinea. The end of the colno was divided and stapled off with a single firing of the KELLI blue load 100. The anastomosis was reinforced with sutures along the transverse staple line. After this, the anastomosis appeared highly satisfactory and was under no tension and healthy in appearance with excellent orientation. There was very good abdominal hemostasis.     There was a fascia defect on the upper aspect of the midline wound because of the associated mucous fistula and the associated hernia which had been causing prolapse of the long Reece's.  We cleared away the subcutaneous tissue with just over a centimeter flaps to expose the fascia. The fascia was closed with #1 PDS suture in a figure of 8 fashion at the end ileostomy site and the midline. The skin was irrigated out and the subcutaneous tissue was reapproximated with staples at the midline. The skin of the end ileostomy site was brought closer together with a 2-0 Monocryl and nuguaze used to pack this. At the end the case, all instruments and sponge counts were correct x2. The patient was emerged from anesthesia and taken to postoperative anesthesia care unit in good condition.     SPECIMEN: end ileostomy, mucous fistula.     ESTIMATED BLOOD LOSS: See anesthesia record.     DRAINS: none.     URINE  OUTPUT: See anesthesia record.     LISA CORNELIUS MD   Colon and Rectal Surgery Staff  [facility]

## 2018-01-06 NOTE — PLAN OF CARE
Problem: Surgery Nonspecified (Adult)  Goal: Signs and Symptoms of Listed Potential Problems Will be Absent, Minimized or Managed (Surgery Nonspecified)  Signs and symptoms of listed potential problems will be absent, minimized or managed by discharge/transition of care (reference Surgery Nonspecified (Adult) CPG).   Outcome: No Change  No acute changes from 5712-6795. Weaned to room air, no flatus, hypoactive BS, tolerating clears, PCA controlling pain, auop via vazquez, incision marked with no extended drng, sat edge of bed, ready to ambulate and increase activity tomorrow. No concerns.

## 2018-01-06 NOTE — CONSULTS
[facility], [place]  Immunosuppression Note:    Camacho Bhagat is a 52 year old male who is seen today  for immunosuppression management     I, Jovan Tran MD, I have examined the patient with the resident/PA/Fellow, discussed and agree with the note and findings.  I have reviewed today's vital signs, medications, labs and imaging. I reviewed the immunosuppression medications and levels. I spoke to the patient/family and explained below clinical details and answered all the questions      Transplant Surgery Consultation     Date of Admission:  1/5/2018    Assessment & Plan   Camacho Bhagat is a 52 year old male who was admitted on 1/5/2018 for take down of end ileostomy and colorectal anastamosis. I was asked to see the patient for liver transplant function and immunosuppression.  -Continue home IS: Cellcept 720mg BID, Prednisone 5mg daily, FK 1mg BID  -Goal FK 5-8, level pending today, will adjust as necessary  -LFTs stable, resume home lab schedule    Luiza Wing    Code Status    Full Code    Reason for Consult   Reason for consult: I was asked to evaluate this patient for immunosuppression.    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 He presents following take down of end ileostomy and colorectal anastamosis.     Past Medical History   Past medical history reviewed with no previously diagnosed medical problems.    Past Surgical History   Past surgical history reviewed with no previous surgeries identified.    Prior to Admission Medications   Prior to Admission Medications   Prescriptions Last Dose Informant Patient Reported? Taking?   CIALIS 5 MG tablet More than a month at Unknown time  Yes No    Sig: Take 5 mg by mouth as needed    GABAPENTIN PO 1/5/2018 at 0545  Yes Yes   Sig: Take 300 mg by mouth 3 times daily   OMEPRAZOLE PO 1/5/2018 at 0545  Yes Yes   Sig: Take 20 mg by mouth every morning    amLODIPine (NORVASC) 10 MG tablet 1/5/2018 at 0545  No Yes   Sig: Take 1 tablet (10 mg) by mouth daily   Patient taking differently: Take 10 mg by mouth every morning    cholecalciferol (VITAMIN D3) 1000 UNIT tablet Past Week at Unknown time  No Yes   Sig: Take 1 tablet (1,000 Units) by mouth daily   cyclobenzaprine (FLEXERIL) 10 MG tablet Past Month at Unknown time Self No Yes   Sig: Take 1 tablet (10 mg) by mouth 3 times daily as needed for muscle spasms   fludrocortisone (FLORINEF) 0.1 MG tablet 1/5/2018 at 0545  No Yes   Sig: Take 3 tablets (0.3 mg) by mouth daily   furosemide (LASIX) 20 MG tablet 1/4/2018 at Unknown time  No Yes   Sig: Take 1 tablet (20 mg) by mouth 2 times daily   insulin glargine (LANTUS) 100 UNIT/ML injection 1/5/2018 at 0545  Yes Yes   Sig: Inject 50 Units Subcutaneous every morning   linagliptin (TRADJENTA) 5 MG TABS tablet 1/5/2018 at 0545  Yes Yes   Sig: Take 5 mg by mouth every evening    loperamide (IMODIUM) 2 MG capsule Past Month at Unknown time Self No Yes   Sig: Take 2 capsules (4 mg) by mouth 2 times daily   Patient taking differently: Take 2 mg by mouth 2 times daily as needed    magnesium oxide (MAG-OX) 400 (241.3 MG) MG tablet 1/4/2018 at Unknown time  No Yes   Sig: Take 1 tablet (400 mg) by mouth 2 times daily   Patient taking differently: Take 400 mg by mouth daily    mycophenolic acid (GENERIC EQUIVALENT) 360 MG EC tablet 1/5/2018 at 0545  No Yes   Sig: Take 2 tablets (720 mg) by mouth every 12 hours   oxyCODONE IR (ROXICODONE) 10 MG tablet Past Month at Unknown time  No Yes   Sig: Take 1 tablet (10 mg) by mouth daily as needed for moderate to severe pain   patiromer (VELTASSA) 8.4 G packet 1/4/2018 at Unknown time  No Yes   Sig: Take 8.4 g by mouth daily   predniSONE  (DELTASONE) 5 MG tablet 1/5/2018 at 0545  No Yes   Sig: Take 1 tablet (5 mg) by mouth daily   sodium bicarbonate 650 MG tablet 1/5/2018 at 0545  No Yes   Sig: Take 2 tablets (1,300 mg) by mouth 3 times daily   tacrolimus (GENERIC EQUIVALENT) 1 MG capsule 1/5/2018 at 0545  No Yes   Sig: Take 1 capsule (1 mg) by mouth every 12 hours      Facility-Administered Medications: None     Allergies   Allergies   Allergen Reactions     Erythromycin GI Disturbance     Vioxx      Nausea, vomiting       Social History   I have reviewed this patient's social history and updated it with pertinent information if needed. Camacho Bhagat  reports that he quit smoking about 30 years ago. He has a 1.50 pack-year smoking history. He quit smokeless tobacco use about 2 years ago. His smokeless tobacco use included Chew. He reports that he does not drink alcohol or use illicit drugs.    Family History   I have reviewed this patient's family history and updated it with pertinent information if needed.   Family History   Problem Relation Age of Onset     DIABETES Father      Hypertension Father      Substance Abuse Father      Arthritis Mother      Thyroid Cancer Mother      Survivor!     Cervical Cancer Maternal Grandmother      CEREBROVASCULAR DISEASE Maternal Grandfather      Prostate Cancer Paternal Grandfather      Colon Cancer No family hx of      Hyperlipidemia No family hx of      Coronary Artery Disease No family hx of      Breast Cancer No family hx of        Review of Systems   The 5 point Review of Systems is negative other than noted in the HPI or here.     Physical Exam   Temp: 97.7  F (36.5  C) Temp src: Oral BP: 147/79   Heart Rate: 80 Resp: 16 SpO2: 96 % O2 Device: None (Room air) Oxygen Delivery: 2 LPM  Vital Signs with Ranges  Temp:  [96.6  F (35.9  C)-99  F (37.2  C)] 97.7  F (36.5  C)  Heart Rate:  [65-80] 80  Resp:  [12-18] 16  BP: (126-157)/(62-86) 147/79  SpO2:  [96 %-100 %] 96 %  215 lbs 9.76 oz    Constitutional:  Awake, alert, cooperative, no apparent distress, and appears stated age.  Respiratory: No increased work of breathing, good air exchange  Cardiovascular: Regular rate and rhythm  GI: surgical scars well approximated, surgical dressings with small amount of s/s   Genitourinary:  Crowe with celestin urine output  Neurologic: Awake, alert, oriented to name, place and time.    Neuropsychiatric: Calm, normal eye contact, alert, normal affect, oriented to self, place, time and situation, memory for past and recent events intact and thought process normal.    Data   Most Recent 3 CBC's:  Recent Labs   Lab Test  01/06/18   0751  01/05/18   0833  01/02/18   1108   WBC  6.2  5.7  4.9   HGB  9.1*  8.9*  9.4*   MCV  96  93  95   PLT  78*  71*  83*     Most Recent 3 BMP's:  Recent Labs   Lab Test  01/06/18   0751  01/05/18   0833  01/02/18   1108   NA  145*  139  140   POTASSIUM  4.5  4.1  4.9   CHLORIDE  111*  106  106   CO2  27  25  29   BUN  23  23  24   CR  1.40*  1.06  1.20   ANIONGAP  7  8  6   JACOB  7.9*  8.4*  7.9*   GLC  71  94  189*     Most Recent 2 LFT's:  Recent Labs   Lab Test  01/06/18   0751  01/02/18   1108   AST  14  26   ALT  17  22   ALKPHOS  72  123   BILITOTAL  0.5  0.5     Most Recent 3 INR's:  Recent Labs   Lab Test  01/02/18   1108  12/20/17   1648  08/24/17   1406   INR  0.99  0.96  1.12

## 2018-01-06 NOTE — PLAN OF CARE
"Problem: Surgery Nonspecified (Adult)  Goal: Signs and Symptoms of Listed Potential Problems Will be Absent, Minimized or Managed (Surgery Nonspecified)  Signs and symptoms of listed potential problems will be absent, minimized or managed by discharge/transition of care (reference Surgery Nonspecified (Adult) CPG).   Outcome: No Change  ./75 (BP Location: Left arm)  Temp 99  F (37.2  C) (Oral)  Resp 18  Ht 1.829 m (6' 0.01\")  Wt 97.8 kg (215 lb 9.8 oz)  SpO2 97%  BMI 29.24 kg/m2     Patient using PCA dilaudid with well controlled pain, no nausea; midline incision and other both covered with small dried drainage; vazquez in place with adequate output; patient passing gas, bowels hypoactive; sat on edge on bed, using IS; appeared to sleep well      "

## 2018-01-06 NOTE — PLAN OF CARE
Problem: Patient Care Overview  Goal: Plan of Care/Patient Progress Review  Outcome: Improving  Vitals:    01/05/18 2000 01/05/18 2231 01/06/18 0357 01/06/18 0759   BP: 131/62 128/69 133/75 147/79   BP Location: Left arm Left arm Left arm    Resp: 16 16 18 16   Temp: 97.4  F (36.3  C) 98.3  F (36.8  C) 99  F (37.2  C) 97.7  F (36.5  C)   TempSrc: Oral Oral Oral Oral   SpO2: 97% 98% 97% 96%   Weight:       Height:       BG 73 and 158 pt on clear liquid diet had breakfast deferred lunch. Pt declined Insulin at noon.   Abd incision site dressing intact and covered with old marked drainage. Pain well managed with PCA Dilaudid.   Crowe patent over 600cc out. Pt had small brown loose stools . MD notified pt wearing briefs.   Up ambulating in halls.  Did well. All admission requirements and education as well as care plan done this shift.   Continue to follow up per plan of care.

## 2018-01-06 NOTE — PLAN OF CARE
Problem: Patient Care Overview  Goal: Plan of Care/Patient Progress Review  Pt POD #0 take down end ileostomy & colorectal anastamosis. Pt's vitals stable, reports some discomfort with movement in bed. Has not used dilaudid PCA yet. Encouraged pt to stay ahead of the pain. Pt sipping on water and ate a popsicle. Crowe patent with adequate output. Pt's dressings dry and intact, some shadowing under right dressing (unchanged since PACU). Pt has hypoactive bowel sounds. Will continue to monitor.

## 2018-01-06 NOTE — PROGRESS NOTES
"Colorectal Surgery Progress Note    S: No acute events overnight.  Feels well this morning.  No complaints.  Denies nausea.  Passing flatus.      O:  /79  Temp 97.7  F (36.5  C) (Oral)  Resp 16  Ht 1.829 m (6' 0.01\")  Wt 97.8 kg (215 lb 9.8 oz)  SpO2 96%  BMI 29.24 kg/m2    I/O last 3 completed shifts:  In: 2999 [P.O.:840; I.V.:2159]  Out: 1430 [Urine:1030; Other:400]    Laying in bed in NAD  RRR  Non-labored breathing on room air  Abdomen soft and non-distended.  Appropriately tender  Incision clean and intact  Vazquez in place    Labs pending    A/P  52 year old male with complex medical and surgical history including DM, RA, liver transplantation, and right hemicolectomy for retroperitoneal free air now POD 1 from ileostomy reversal with end to side ileocolic anastomosis.  Doing well.     - Continue PCA  - Home amlodipine.  Holding home lasix today.  Restart tomorrow if stable.  - SSI for now.  Restart home lantus and linaglipten when eating more.    - Continue prednisone, tacrolimus, and cellcept at home doses.  Appreciate liver transplant consult.  Tacro level pending.   - Advance to full liquid diet today  - Remove vazquez today  - Encourage ambulation and IS  - Discharge pending full return of bowel function.     Miguel Bernard  General Surgery PGY-3  Pager [phone]      "

## 2018-01-07 VITALS
HEIGHT: 72 IN | SYSTOLIC BLOOD PRESSURE: 157 MMHG | RESPIRATION RATE: 16 BRPM | BODY MASS INDEX: 29.2 KG/M2 | WEIGHT: 215.61 LBS | TEMPERATURE: 99.4 F | OXYGEN SATURATION: 96 % | DIASTOLIC BLOOD PRESSURE: 71 MMHG

## 2018-01-07 LAB
ANION GAP SERPL CALCULATED.3IONS-SCNC: 5 MMOL/L (ref 3–14)
ANION GAP SERPL CALCULATED.3IONS-SCNC: NORMAL MMOL/L (ref 6–17)
BUN SERPL-MCNC: 21 MG/DL (ref 7–30)
BUN SERPL-MCNC: NORMAL MG/DL (ref 7–30)
CALCIUM SERPL-MCNC: 8 MG/DL (ref 8.5–10.1)
CALCIUM SERPL-MCNC: NORMAL MG/DL (ref 8.5–10.1)
CHLORIDE SERPL-SCNC: 110 MMOL/L (ref 94–109)
CHLORIDE SERPL-SCNC: NORMAL MMOL/L (ref 94–109)
CO2 SERPL-SCNC: 27 MMOL/L (ref 20–32)
CO2 SERPL-SCNC: NORMAL MMOL/L (ref 20–32)
CREAT SERPL-MCNC: 1.38 MG/DL (ref 0.66–1.25)
CREAT SERPL-MCNC: NORMAL MG/DL (ref 0.66–1.25)
GFR SERPL CREATININE-BSD FRML MDRD: 54 ML/MIN/1.7M2
GFR SERPL CREATININE-BSD FRML MDRD: NORMAL ML/MIN/1.7M2
GLUCOSE BLDC GLUCOMTR-MCNC: 127 MG/DL (ref 70–99)
GLUCOSE BLDC GLUCOMTR-MCNC: 206 MG/DL (ref 70–99)
GLUCOSE SERPL-MCNC: 119 MG/DL (ref 70–99)
GLUCOSE SERPL-MCNC: NORMAL MG/DL (ref 70–99)
MAGNESIUM SERPL-MCNC: 1.7 MG/DL (ref 1.6–2.3)
MAGNESIUM SERPL-MCNC: NORMAL MG/DL (ref 1.6–2.3)
PHOSPHATE SERPL-MCNC: 2.8 MG/DL (ref 2.5–4.5)
PHOSPHATE SERPL-MCNC: NORMAL MG/DL (ref 2.5–4.5)
POTASSIUM SERPL-SCNC: 4.5 MMOL/L (ref 3.4–5.3)
POTASSIUM SERPL-SCNC: NORMAL MMOL/L (ref 3.4–5.3)
SODIUM SERPL-SCNC: 142 MMOL/L (ref 133–144)
SODIUM SERPL-SCNC: NORMAL MMOL/L (ref 133–144)
TACROLIMUS BLD-MCNC: <3 UG/L (ref 5–15)
TACROLIMUS BLD-MCNC: NORMAL UG/L (ref 5–15)
TME LAST DOSE: ABNORMAL H
TME LAST DOSE: NORMAL H

## 2018-01-07 PROCEDURE — 25000131 ZZH RX MED GY IP 250 OP 636 PS 637: Performed by: PHYSICIAN ASSISTANT

## 2018-01-07 PROCEDURE — 25000128 H RX IP 250 OP 636: Performed by: ANESTHESIOLOGY

## 2018-01-07 PROCEDURE — 25000132 ZZH RX MED GY IP 250 OP 250 PS 637: Performed by: STUDENT IN AN ORGANIZED HEALTH CARE EDUCATION/TRAINING PROGRAM

## 2018-01-07 PROCEDURE — 25000131 ZZH RX MED GY IP 250 OP 636 PS 637: Performed by: STUDENT IN AN ORGANIZED HEALTH CARE EDUCATION/TRAINING PROGRAM

## 2018-01-07 PROCEDURE — 25800025 ZZH RX 258: Performed by: STUDENT IN AN ORGANIZED HEALTH CARE EDUCATION/TRAINING PROGRAM

## 2018-01-07 PROCEDURE — 25000125 ZZHC RX 250: Performed by: STUDENT IN AN ORGANIZED HEALTH CARE EDUCATION/TRAINING PROGRAM

## 2018-01-07 PROCEDURE — 25000128 H RX IP 250 OP 636: Performed by: STUDENT IN AN ORGANIZED HEALTH CARE EDUCATION/TRAINING PROGRAM

## 2018-01-07 PROCEDURE — 83735 ASSAY OF MAGNESIUM: CPT | Performed by: COLON & RECTAL SURGERY

## 2018-01-07 PROCEDURE — 80048 BASIC METABOLIC PNL TOTAL CA: CPT | Performed by: COLON & RECTAL SURGERY

## 2018-01-07 PROCEDURE — 84100 ASSAY OF PHOSPHORUS: CPT | Performed by: COLON & RECTAL SURGERY

## 2018-01-07 PROCEDURE — 40000556 ZZH STATISTIC PERIPHERAL IV START W US GUIDANCE

## 2018-01-07 PROCEDURE — 00000146 ZZHCL STATISTIC GLUCOSE BY METER IP

## 2018-01-07 PROCEDURE — 80197 ASSAY OF TACROLIMUS: CPT | Performed by: COLON & RECTAL SURGERY

## 2018-01-07 PROCEDURE — 36415 COLL VENOUS BLD VENIPUNCTURE: CPT | Performed by: COLON & RECTAL SURGERY

## 2018-01-07 RX ORDER — TACROLIMUS 1 MG/1
2 CAPSULE ORAL 2 TIMES DAILY
Status: DISCONTINUED | OUTPATIENT
Start: 2018-01-07 | End: 2018-01-07 | Stop reason: HOSPADM

## 2018-01-07 RX ORDER — FUROSEMIDE 20 MG
20 TABLET ORAL 2 TIMES DAILY
Status: DISCONTINUED | OUTPATIENT
Start: 2018-01-07 | End: 2018-01-07 | Stop reason: HOSPADM

## 2018-01-07 RX ORDER — HYDROMORPHONE HCL/0.9% NACL/PF 0.2MG/0.2
0.2 SYRINGE (ML) INTRAVENOUS
Status: DISCONTINUED | OUTPATIENT
Start: 2018-01-07 | End: 2018-01-07 | Stop reason: HOSPADM

## 2018-01-07 RX ORDER — POLYETHYLENE GLYCOL 3350 17 G/17G
1 POWDER, FOR SOLUTION ORAL DAILY
Qty: 510 G | Refills: 1 | Status: SHIPPED | OUTPATIENT
Start: 2018-01-07 | End: 2018-02-28

## 2018-01-07 RX ORDER — OXYCODONE HYDROCHLORIDE 10 MG/1
10 TABLET ORAL EVERY 4 HOURS PRN
Status: DISCONTINUED | OUTPATIENT
Start: 2018-01-07 | End: 2018-01-07 | Stop reason: HOSPADM

## 2018-01-07 RX ORDER — DEXTROSE MONOHYDRATE, SODIUM CHLORIDE, AND POTASSIUM CHLORIDE 50; 1.49; 4.5 G/1000ML; G/1000ML; G/1000ML
INJECTION, SOLUTION INTRAVENOUS CONTINUOUS
Status: DISCONTINUED | OUTPATIENT
Start: 2018-01-07 | End: 2018-01-07

## 2018-01-07 RX ORDER — OXYCODONE HYDROCHLORIDE 5 MG/1
5 TABLET ORAL EVERY 4 HOURS PRN
Qty: 30 TABLET | Refills: 0 | Status: SHIPPED | OUTPATIENT
Start: 2018-01-07 | End: 2018-01-31

## 2018-01-07 RX ADMIN — OMEPRAZOLE 20 MG: 20 CAPSULE, DELAYED RELEASE ORAL at 08:34

## 2018-01-07 RX ADMIN — TACROLIMUS 1.5 MG: 1 CAPSULE ORAL at 08:34

## 2018-01-07 RX ADMIN — GABAPENTIN 300 MG: 300 CAPSULE ORAL at 08:34

## 2018-01-07 RX ADMIN — FLUDROCORTISONE ACETATE 0.3 MG: 0.1 TABLET ORAL at 08:34

## 2018-01-07 RX ADMIN — METOCLOPRAMIDE 10 MG: 5 INJECTION, SOLUTION INTRAMUSCULAR; INTRAVENOUS at 01:02

## 2018-01-07 RX ADMIN — PREDNISONE 5 MG: 5 TABLET ORAL at 08:34

## 2018-01-07 RX ADMIN — ENOXAPARIN SODIUM 40 MG: 40 INJECTION SUBCUTANEOUS at 08:34

## 2018-01-07 RX ADMIN — SODIUM BICARBONATE 1300 MG: 650 TABLET ORAL at 08:34

## 2018-01-07 RX ADMIN — METOCLOPRAMIDE 10 MG: 5 INJECTION, SOLUTION INTRAMUSCULAR; INTRAVENOUS at 06:53

## 2018-01-07 RX ADMIN — FUROSEMIDE 20 MG: 20 TABLET ORAL at 08:34

## 2018-01-07 RX ADMIN — AMLODIPINE BESYLATE 10 MG: 10 TABLET ORAL at 08:34

## 2018-01-07 RX ADMIN — MYCOPHENOLIC ACID 720 MG: 360 TABLET, DELAYED RELEASE ORAL at 08:34

## 2018-01-07 RX ADMIN — POTASSIUM CHLORIDE, DEXTROSE MONOHYDRATE AND SODIUM CHLORIDE: 150; 5; 450 INJECTION, SOLUTION INTRAVENOUS at 05:26

## 2018-01-07 NOTE — PLAN OF CARE
Problem: Patient Care Overview  Goal: Individualization & Mutuality  Outcome: Adequate for Discharge Date Met: 01/07/18  Camacho had a tmax of 99.4, OVSS on room air. Denies significant pain or nausea. Tolerating a low fiber diet w fair appetite. Up ad evette. Voiding spontaneously and having BMs. PIV removed. D/c orders reviewed with pt. D/c to pharmacy and front door with wife and all belongings. Will follow up outpatient.

## 2018-01-07 NOTE — PROGRESS NOTES
Colorectal Surgery Note  January 7, 2018    S:- Feels well this AM, wants to leave today  - No n/v, pain well controlled  - +flatus, +bm  - No acute events overnight    O:  Temp:  [97.7  F (36.5  C)-99.9  F (37.7  C)] 99.9  F (37.7  C)  Heart Rate:  [80-87] 86  Resp:  [16] 16  BP: (147-154)/(79-82) 154/79  SpO2:  [95 %-98 %] 95 %    Well appearing man in nad  Nlb on ra  Abd s/nt/nd, incisions c/d/i  Legs appear swollen but no appreciable pitting edema    Labs pending this am    PO 1080 / 24 hrs  UOP 1700 / 24 hrs    A/P: 51 y/o male with pmh of liver transplant for hcc, right hemicolectomy 2/2 to retroperitoneal air after scope, and uncontrolled DMT2 that is POD 2 from ileostomy and mucous fistula takedowns with ileocolonic anastomosis. Recovering magnificently w/ ROBF.  - Transplant surgery consult, appreciate recs. Okay from transplant's perspective for pt to go home?  - Home lasix  - Low fiber diet  - Crowe out  - PCA off, po oxy  - Lovenox  - Tentative D/C home today if things go very well    --  Charles Markham MD  Gen Surg PGY1

## 2018-01-07 NOTE — PLAN OF CARE
"Problem: Patient Care Overview  Goal: Plan of Care/Patient Progress Review  Outcome: No Change  /79 (BP Location: Right arm)  Temp 99.9  F (37.7  C) (Oral)  Resp 16  Ht 1.829 m (6' 0.01\")  Wt 97.8 kg (215 lb 9.8 oz)  SpO2 95%  BMI 29.24 kg/m2  Patient afebrile, VSS. Patients pain controlled with PCA@ 0.2. Patients vazquez with large output. Patient declined to take 1.5 mg of tacrolimus but took 1mg and wanted to speak to transplant team first. Patient appears to rest between cares. Cont with POC.      "

## 2018-01-07 NOTE — CONSULTS
[facility], [place]  Immunosuppression Note:    Camacho Bhagat is a 52 year old male who is seen today  for immunosuppression management     I, Jovan Tran MD, I have examined the patient with the resident/PA/Fellow, discussed and agree with the note and findings.  I have reviewed today's vital signs, medications, labs and imaging. I reviewed the immunosuppression medications and levels. I spoke to the patient/family and explained below clinical details and answered all the questions        Transplant Surgery Consultation     Date of Admission:  1/5/2018    Assessment & Plan   Camacho Bhagat is a 52 year old male who was admitted on 1/5/2018 for take down of end ileostomy and colorectal anastamosis. I was asked to see the patient for liver transplant function and immunosuppression.   - Home IS: Cellcept 720mg BID, Prednisone 5mg daily, FK 1mg BID  - Goal FK 5-8, level 1/6 <3, increased from 1 to 1.5, 1/7 level pending today, will adjust as necessary   -LFTs stable, resume home lab schedule  - H/o CMV, will check CMV PCR, pending  - BM resumed, discharge per primary team  - Plan to follow up in transplant surgery clinic with Dr. Tran in 2 weeks    Luiza Wing    Code Status    Full Code    Reason for Consult   Reason for consult: I was asked to evaluate this patient for immunosuppression.    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 He presents following take down of end ileostomy and colorectal anastamosis.     Past Medical History   Past medical history reviewed with no previously diagnosed medical problems.    Past Surgical History   Past surgical history reviewed with no previous surgeries  identified.    Prior to Admission Medications   Prior to Admission Medications   Prescriptions Last Dose Informant Patient Reported? Taking?   CIALIS 5 MG tablet More than a month at Unknown time  Yes No   Sig: Take 5 mg by mouth as needed    GABAPENTIN PO 1/5/2018 at 0545  Yes Yes   Sig: Take 300 mg by mouth 3 times daily   OMEPRAZOLE PO 1/5/2018 at 0545  Yes Yes   Sig: Take 20 mg by mouth every morning    amLODIPine (NORVASC) 10 MG tablet 1/5/2018 at 0545  No Yes   Sig: Take 1 tablet (10 mg) by mouth daily   Patient taking differently: Take 10 mg by mouth every morning    cholecalciferol (VITAMIN D3) 1000 UNIT tablet Past Week at Unknown time  No Yes   Sig: Take 1 tablet (1,000 Units) by mouth daily   cyclobenzaprine (FLEXERIL) 10 MG tablet Past Month at Unknown time Self No Yes   Sig: Take 1 tablet (10 mg) by mouth 3 times daily as needed for muscle spasms   fludrocortisone (FLORINEF) 0.1 MG tablet 1/5/2018 at 0545  No Yes   Sig: Take 3 tablets (0.3 mg) by mouth daily   furosemide (LASIX) 20 MG tablet 1/4/2018 at Unknown time  No Yes   Sig: Take 1 tablet (20 mg) by mouth 2 times daily   insulin glargine (LANTUS) 100 UNIT/ML injection 1/5/2018 at 0545  Yes Yes   Sig: Inject 50 Units Subcutaneous every morning   linagliptin (TRADJENTA) 5 MG TABS tablet 1/5/2018 at 0545  Yes Yes   Sig: Take 5 mg by mouth every evening    loperamide (IMODIUM) 2 MG capsule Past Month at Unknown time Self No Yes   Sig: Take 2 capsules (4 mg) by mouth 2 times daily   Patient taking differently: Take 2 mg by mouth 2 times daily as needed    magnesium oxide (MAG-OX) 400 (241.3 MG) MG tablet 1/4/2018 at Unknown time  No Yes   Sig: Take 1 tablet (400 mg) by mouth 2 times daily   Patient taking differently: Take 400 mg by mouth daily    mycophenolic acid (GENERIC EQUIVALENT) 360 MG EC tablet 1/5/2018 at 0545  No Yes   Sig: Take 2 tablets (720 mg) by mouth every 12 hours   oxyCODONE IR (ROXICODONE) 10 MG tablet Past Month at Unknown time  No  Yes   Sig: Take 1 tablet (10 mg) by mouth daily as needed for moderate to severe pain   patiromer (VELTASSA) 8.4 G packet 1/4/2018 at Unknown time  No Yes   Sig: Take 8.4 g by mouth daily   predniSONE (DELTASONE) 5 MG tablet 1/5/2018 at 0545  No Yes   Sig: Take 1 tablet (5 mg) by mouth daily   sodium bicarbonate 650 MG tablet 1/5/2018 at 0545  No Yes   Sig: Take 2 tablets (1,300 mg) by mouth 3 times daily   tacrolimus (GENERIC EQUIVALENT) 1 MG capsule 1/5/2018 at 0545  No Yes   Sig: Take 1 capsule (1 mg) by mouth every 12 hours      Facility-Administered Medications: None     Allergies   Allergies   Allergen Reactions     Erythromycin GI Disturbance     Vioxx      Nausea, vomiting       Social History   I have reviewed this patient's social history and updated it with pertinent information if needed. Camacho Bhagat  reports that he quit smoking about 30 years ago. He has a 1.50 pack-year smoking history. He quit smokeless tobacco use about 2 years ago. His smokeless tobacco use included Chew. He reports that he does not drink alcohol or use illicit drugs.    Family History   I have reviewed this patient's family history and updated it with pertinent information if needed.   Family History   Problem Relation Age of Onset     DIABETES Father      Hypertension Father      Substance Abuse Father      Arthritis Mother      Thyroid Cancer Mother      Survivor!     Cervical Cancer Maternal Grandmother      CEREBROVASCULAR DISEASE Maternal Grandfather      Prostate Cancer Paternal Grandfather      Colon Cancer No family hx of      Hyperlipidemia No family hx of      Coronary Artery Disease No family hx of      Breast Cancer No family hx of        Review of Systems   The 5 point Review of Systems is negative other than noted in the HPI or here.     Physical Exam   Temp: 99.4  F (37.4  C) Temp src: Oral BP: 157/71   Heart Rate: 86 Resp: 16 SpO2: 96 % O2 Device: None (Room air)    Vital Signs with Ranges  Temp:  [99.1  F (37.3   C)-99.9  F (37.7  C)] 99.4  F (37.4  C)  Heart Rate:  [86-87] 86  Resp:  [16] 16  BP: (154-157)/(71-82) 157/71  SpO2:  [95 %-98 %] 96 %  215 lbs 9.76 oz    Constitutional: Awake, alert, cooperative, no apparent distress, and appears stated age.  Respiratory: No increased work of breathing, good air exchange  Cardiovascular: Regular rate and rhythm  GI: surgical scars well approximated, surgical dressings with small amount of s/s   Genitourinary:  Crowe with celestin urine output  Neurologic: Awake, alert, oriented to name, place and time.    Neuropsychiatric: Calm, normal eye contact, alert, normal affect, oriented to self, place, time and situation, memory for past and recent events intact and thought process normal.    Data   Most Recent 3 CBC's:  Recent Labs   Lab Test  01/06/18   0751  01/05/18   0833  01/02/18   1108   WBC  6.2  5.7  4.9   HGB  9.1*  8.9*  9.4*   MCV  96  93  95   PLT  78*  71*  83*     Most Recent 3 BMP's:  Recent Labs   Lab Test  01/07/18   0835  01/07/18   0713  01/06/18   0751   NA  142  Canceled, Test credited  145*   POTASSIUM  4.5  Canceled, Test credited  4.5   CHLORIDE  110*  Canceled, Test credited  111*   CO2  27  Canceled, Test credited  27   BUN  21  Canceled, Test credited  23   CR  1.38*  Canceled, Test credited  1.40*   ANIONGAP  5  Canceled, Test credited  7   JACOB  8.0*  Canceled, Test credited  7.9*   GLC  119*  Canceled, Test credited  71     Most Recent 2 LFT's:  Recent Labs   Lab Test  01/06/18   0751  01/02/18   1108   AST  14  26   ALT  17  22   ALKPHOS  72  123   BILITOTAL  0.5  0.5     Most Recent 3 INR's:  Recent Labs   Lab Test  01/02/18   1108  12/20/17   1648  08/24/17   1406   INR  0.99  0.96  1.12

## 2018-01-07 NOTE — PLAN OF CARE
Problem: Patient Care Overview  Goal: Plan of Care/Patient Progress Review  Outcome: No Change  Vital signs:  Temp: 99.9  F (37.7  C) Temp src: Oral BP: 154/79   Heart Rate: 86 Resp: 16 SpO2: 95 %   A&Ox4. Sats 95-98% on RA. No c/o SOB. Pain adequately managed with PCA. Abdominal dressing with dry drainage. +BS, +flatus, loose stool. Crowe with adequate UOP. Low fiber diet. MIVF infusing at 100 ml/hr. Pt appears to be resting between cares. Continue POC.

## 2018-01-07 NOTE — DISCHARGE SUMMARY
[facility]  Discharge Summary  Colon and Rectal Surgery     Camacho Bhagat MRN# [id number]   YOB: 1965 Age: 52 year old     Date of Admission:  1/5/2018  Date of Discharge::  1/7/2018  Admitting Physician:  Sara Dinh MD  Discharge Physician:  Taco Markham MD  Primary Care Physician:        Shay Kirkpatrick          Admission Diagnoses:   Colitis   Ileostomy in place (H)          Discharge Diagnosis:   Same         Procedures:   Exploratory Laparotomy, Lysis of Adhesions, Takedown Of End Ileostomy, Takedown of mucocutaneous fistula, ileocolic resection  And ileocolic end to side Anastomosis, Primary repair of Ventral Hernia, Anesthesia Block                Consultations:   TRANSPLANT SURGERY LIVER ADULT IP CONSULT  VASCULAR ACCESS CARE ADULT IP CONSULT         Imaging Studies:     Results for orders placed or performed during the hospital encounter of 10/24/17   MR Abdomen w/o Contrast    Narrative    MRI LIVER WITHOUT CONTRAST    CLINICAL HISTORY: Eval for liver transplant rejection per GI    TECHNIQUE:     Images were acquired without intravenous contrast through the liver.  The following MR images were acquired: TrueFISP, multiplanar T2  weighted, axial T1 in/out of phase, axial fat-saturated T1,  diffusion-weighted.     Comparison study: No prior MR of the abdomen is available for direct  comparison. Correlation is made with CT abdomen 9/20/2017    FINDINGS:    Liver: Changes of liver transplant. No focal mass. Loss of signal on  in phase imaging relative to out of phase imaging compatible with and  a position. No intrahepatic biliary dilation.    Gallbladder: Surgically absent    Spleen: Unchanged splenomegaly up to 23 cm. Iron deposition in the  spleen.    Kidneys: Prominence of the collecting system bilaterally with mild  bilateral hydronephrosis.    Adrenal glands: Unremarkable.    Pancreas: Significant pancreatic atrophy.    Bowel: Midline anterior ileostomy.  No dilated loops of bowel. No focal  bowel wall thickening.    Lymph nodes: No intra-abdominal lymphadenopathy.    Blood vessels: Normal in caliber. IVC filter in place.    Lung bases: Small to moderate right pleural effusion. Loculated  component of the right pleural effusions with 8 ovoid structure in the  medial right pleural space likely representing resolving hematoma.   Trace left pleural effusion. No airspace consolidation.    Bones and soft tissues: Acute or suspicious osseous abnormality.    Mesentery and abdominal wall: Ileostomy.Anasarca.    Ascites: Small ascites      Impression    IMPRESSION:    1. Postsurgical changes of liver transplant. No evidence of  inflammation or biliary dilation. Iron deposition in the liver.   2. Continued splenomegaly with iron deposition in the spleen.   3. Small to moderate complex right and trace left pleural effusions.  Small ascites.    I have personally reviewed the examination and initial interpretation  and I agree with the findings.    BASILIO SAGASTUME MD     *Note: Due to a large number of results and/or encounters for the requested time period, some results have not been displayed. A complete set of results can be found in Results Review.              Medications Prior to Admission:     Prescriptions Prior to Admission   Medication Sig Dispense Refill Last Dose     sodium bicarbonate 650 MG tablet Take 2 tablets (1,300 mg) by mouth 3 times daily 180 tablet 3 1/5/2018 at 0545     cholecalciferol (VITAMIN D3) 1000 UNIT tablet Take 1 tablet (1,000 Units) by mouth daily 100 tablet 3 Past Week at Unknown time     predniSONE (DELTASONE) 5 MG tablet Take 1 tablet (5 mg) by mouth daily 30 tablet 11 1/5/2018 at 0545     mycophenolic acid (GENERIC EQUIVALENT) 360 MG EC tablet Take 2 tablets (720 mg) by mouth every 12 hours 120 tablet 3 1/5/2018 at 0545     patiromer (VELTASSA) 8.4 G packet Take 8.4 g by mouth daily 30 each 3 1/4/2018 at Unknown time     magnesium oxide  (MAG-OX) 400 (241.3 MG) MG tablet Take 1 tablet (400 mg) by mouth 2 times daily (Patient taking differently: Take 400 mg by mouth daily ) 180 tablet 3 1/4/2018 at Unknown time     tacrolimus (GENERIC EQUIVALENT) 1 MG capsule Take 1 capsule (1 mg) by mouth every 12 hours 60 capsule 11 1/5/2018 at 0545     fludrocortisone (FLORINEF) 0.1 MG tablet Take 3 tablets (0.3 mg) by mouth daily 90 tablet 3 1/5/2018 at 0545     amLODIPine (NORVASC) 10 MG tablet Take 1 tablet (10 mg) by mouth daily (Patient taking differently: Take 10 mg by mouth every morning ) 90 tablet 3 1/5/2018 at 0545     oxyCODONE IR (ROXICODONE) 10 MG tablet Take 1 tablet (10 mg) by mouth daily as needed for moderate to severe pain 30 tablet 0 Past Month at Unknown time     furosemide (LASIX) 20 MG tablet Take 1 tablet (20 mg) by mouth 2 times daily 60 tablet 3 1/4/2018 at Unknown time     OMEPRAZOLE PO Take 20 mg by mouth every morning    1/5/2018 at 0545     GABAPENTIN PO Take 300 mg by mouth 3 times daily   1/5/2018 at 0545     insulin glargine (LANTUS) 100 UNIT/ML injection Inject 50 Units Subcutaneous every morning   1/5/2018 at 0545     linagliptin (TRADJENTA) 5 MG TABS tablet Take 5 mg by mouth every evening    1/5/2018 at 0545     loperamide (IMODIUM) 2 MG capsule Take 2 capsules (4 mg) by mouth 2 times daily (Patient taking differently: Take 2 mg by mouth 2 times daily as needed ) 120 capsule 3 Past Month at Unknown time     cyclobenzaprine (FLEXERIL) 10 MG tablet Take 1 tablet (10 mg) by mouth 3 times daily as needed for muscle spasms 90 tablet 0 Past Month at Unknown time     CIALIS 5 MG tablet Take 5 mg by mouth as needed   1 More than a month at Unknown time              Discharge Medications:     Current Discharge Medication List      START taking these medications    Details   !! oxyCODONE IR (ROXICODONE) 5 MG tablet Take 1 tablet (5 mg) by mouth every 4 hours as needed for pain maximum 6 tablet(s) per day  Qty: 30 tablet, Refills: 0     Associated Diagnoses: Ileostomy in place (H)      polyethylene glycol (MIRALAX) powder Take 17 g (1 capful) by mouth daily  Qty: 510 g, Refills: 1    Associated Diagnoses: Ileostomy in place (H)       !! - Potential duplicate medications found. Please discuss with provider.      CONTINUE these medications which have NOT CHANGED    Details   sodium bicarbonate 650 MG tablet Take 2 tablets (1,300 mg) by mouth 3 times daily  Qty: 180 tablet, Refills: 3    Associated Diagnoses: Hyperkalemia      cholecalciferol (VITAMIN D3) 1000 UNIT tablet Take 1 tablet (1,000 Units) by mouth daily  Qty: 100 tablet, Refills: 3    Associated Diagnoses: Vitamin D deficiency      predniSONE (DELTASONE) 5 MG tablet Take 1 tablet (5 mg) by mouth daily  Qty: 30 tablet, Refills: 11    Associated Diagnoses: Liver transplant recipient (H); Long-term use of immunosuppressant medication      mycophenolic acid (GENERIC EQUIVALENT) 360 MG EC tablet Take 2 tablets (720 mg) by mouth every 12 hours  Qty: 120 tablet, Refills: 3    Associated Diagnoses: History of liver transplant (H); Long-term use of immunosuppressant medication      patiromer (VELTASSA) 8.4 G packet Take 8.4 g by mouth daily  Qty: 30 each, Refills: 3    Associated Diagnoses: Hyperkalemia      magnesium oxide (MAG-OX) 400 (241.3 MG) MG tablet Take 1 tablet (400 mg) by mouth 2 times daily  Qty: 180 tablet, Refills: 3    Comments: Dose increase  Associated Diagnoses: Hypomagnesemia; CKD (chronic kidney disease) stage 3, GFR 30-59 ml/min      tacrolimus (GENERIC EQUIVALENT) 1 MG capsule Take 1 capsule (1 mg) by mouth every 12 hours  Qty: 60 capsule, Refills: 11    Associated Diagnoses: Liver transplant recipient (H)      fludrocortisone (FLORINEF) 0.1 MG tablet Take 3 tablets (0.3 mg) by mouth daily  Qty: 90 tablet, Refills: 3    Associated Diagnoses: Hyperkalemia      amLODIPine (NORVASC) 10 MG tablet Take 1 tablet (10 mg) by mouth daily  Qty: 90 tablet, Refills: 3    Associated  "Diagnoses: HTN (hypertension)      !! oxyCODONE IR (ROXICODONE) 10 MG tablet Take 1 tablet (10 mg) by mouth daily as needed for moderate to severe pain  Qty: 30 tablet, Refills: 0    Associated Diagnoses: Osteoarthritis      furosemide (LASIX) 20 MG tablet Take 1 tablet (20 mg) by mouth 2 times daily  Qty: 60 tablet, Refills: 3    Associated Diagnoses: Hyperkalemia      OMEPRAZOLE PO Take 20 mg by mouth every morning       GABAPENTIN PO Take 300 mg by mouth 3 times daily      insulin glargine (LANTUS) 100 UNIT/ML injection Inject 50 Units Subcutaneous every morning      linagliptin (TRADJENTA) 5 MG TABS tablet Take 5 mg by mouth every evening       loperamide (IMODIUM) 2 MG capsule Take 2 capsules (4 mg) by mouth 2 times daily  Qty: 120 capsule, Refills: 3    Associated Diagnoses: S/P right hemicolectomy      cyclobenzaprine (FLEXERIL) 10 MG tablet Take 1 tablet (10 mg) by mouth 3 times daily as needed for muscle spasms  Qty: 90 tablet, Refills: 0    Associated Diagnoses: Immunosuppression (H)      CIALIS 5 MG tablet Take 5 mg by mouth as needed   Refills: 1       !! - Potential duplicate medications found. Please discuss with provider.                   Brief History of Illness:   \"Camacho Bhagat is a very pleasant 52 year old male status post liver transplantation here for follow-up of after exploratory laparotomy, lysis of adhesions, right hemicolectomy, end ileostomy, mucous fistula, and partial omentectomy, 7/29/2017 for retroperitoneal pericolonic air with sepsis after a recent colonoscopy with biopsies and associated colitis.\" per note by Dr. Dinh on 12-29-17. Pt presented to the hospital to undergo ileostomy and mucous fistula takedowns with primary anastomosis.           Hospital Course:   Camacho Bhagat underwent the procedure described above without complication. His post operative recovery was also unremarkable, other than needing a transplant surgery consult given his prior history of liver " "transplantation. CMV levels were pending at the time of discharge, as were some immunosuppression levels. His diet was advanced to a low fiber diet and his vazquez was removed on POD 2. He was also weaned off of IV pain medicine. On the day of discharge he was ambulating, voiding spontaneously, his pain was controlled with PO pain medicine, and he was having bowel movements. Patient is to follow up in the Colon and Rectal Surgery Clinic in 1 week with Caryn Busch NP and then with Dr. Dinh in 2-3 weeks after. He also needs a follow up appointment with Dr. Tran in 2 weeks, of transplant surgery.         Day of Discharge Physical Exam:   Blood pressure 157/71, temperature 99.4  F (37.4  C), temperature source Oral, resp. rate 16, height 1.829 m (6' 0.01\"), weight 97.8 kg (215 lb 9.8 oz), SpO2 96 %.    Well appearing man in nad  Nlb on ra  Abd s/nt/nd, incisions c/d/i  Legs appear swollen but no appreciable pitting edema         Final Pathology Result:   Pending at time of discharge           Discharge Instructions and Follow-Up:       Discharge Procedure Orders  Reason for your hospital stay   Order Comments: Ileostomy status     Activity   Order Comments: Your activity upon discharge: activity as tolerated   Order Specific Question Answer Comments   Is discharge order? Yes      Supplies   Order Comments: Dressing change supplies     Discharge Instructions   Order Comments: DIET  -Low Residue Diet for at least 4-6 weeks unless cleared by Colorectal surgery.  No raw vegetables, fruit skins, fibrous foods that require a lot of chewing, nuts, seeds, corn, popcorn.   -We recommend eating slowly, chewing thoroughly, eating small frequent meals throughout the day  -Stay well hydrated.      ACTIVITY  -No lifting, pushing, pulling greater than 10 lbs and no strenuous exercise for 6 weeks   -No driving while on narcotic analgesics (i.e. Percocet, oxycodone, Vicodin)  -No driving until you are able to " fully twist to both sides or slam on brakes quickly and without any pain    WOUND CLINIC  -Inspect your wounds daily for signs of infection (increased redness, drainage, pain)  -Keep your wound clean and dry  -You may shower, but do not soak in tub or pool    NOTIFY  Please contact Blayne Vasquez LPN, Ragini Mays RN at [phone] for problems after discharge such as:  -Temperature > 101F, chills, rigors, dizziness  -Redness around or purulent drainage from wound  -Inability to tolerate diet, nausea or vomiting  -You stop passing gas, develop significant bloating, abdominal pain  -Have blood in stools/vomit  -Have severe diarrhea/constipation  -Any other questions or concerns.  - At nights (after 4:30pm), on weekends, or if urgent, call [phone] and ask the  to speak with the on-call Colorectal Surgery resident or fellow      Medication Instructions  Some of your medications may have changed. Please take only prescribed and resumed medications     FOLLOW-UP  1.  You will need to follow-up with Caryn Busch NP in the Colon and Rectal Surgery clinic in 1 week(s) and then with Dr Dinh in 2-3 weeks after.  Please contact our clinic scheduler Cassandra Foley (phone # [phone]) if you have not heard from our clinic in 3 business days afer discharge to schedule a follow-up appointment. You will also need follow up in 2 weeks with Dr. Tran.    2.  Follow up with your primary care provider in 1-2 weeks after discharge from the hospital to review this hospitalization.     Wound care and dressings   Order Comments: You may wash your wounds with soap and water. You can use gauze and tegaderms to cover your old ostomy site for the next few days. Do not bathe. Your staples should stay in place until you are seen by Caryn.     Full Code     Diet   Order Comments: Follow this diet upon discharge: Low fiber   Order Specific Question Answer Comments   Is discharge order? Yes                Home Health Care:   Not needed           Discharge Disposition:   Discharged to home      Condition at discharge: Stable    Pt discussed with Dr. Vinicio Ogden on day of discharge.    --  Charles Markham MD  Gen Surg PGY1    Staff Addendum:  Agree with the discharge summary as documented. I was personally involved with the discharge planning and hospital decision-making for this patient.  Sara Dinh MD  Colon and Rectal Surgery Staff  [facility]

## 2018-01-08 ENCOUNTER — TELEPHONE (OUTPATIENT)
Dept: SURGERY | Facility: CLINIC | Age: 53
End: 2018-01-08

## 2018-01-08 ENCOUNTER — TELEPHONE (OUTPATIENT)
Dept: TRANSPLANT | Facility: CLINIC | Age: 53
End: 2018-01-08

## 2018-01-08 NOTE — TELEPHONE ENCOUNTER
Call to check on patient after hospital discharge.  He states that he is doing well.  Pain is well controlled.  He denies any fevers or chills.  He is having soft to loose bowel movements but feels these are starting to firm up.  He denies any nausea or vomiting and is eating and drinking without difficulty.  Would like to see him in clinic in 1 week for follow-up and he confirms appointment date and time of 1/19/2018 at 11 AM.  Encouraged him to contact the clinic in the meantime with any questions or concerns.    LAMIN Queen, NP-C  Colon and Rectal Surgery  [facility]

## 2018-01-08 NOTE — TELEPHONE ENCOUNTER
Call from pt, says his tacrolimus was increased to 1.5 mg bid while in the hospital, wondering how he should take it because he wasn't given script for 0.5 mg caps. Note from inpt team says tacro was increased to 1.5 while waiting for result from 1/7. Tacro level was still < 3 but dose wasn't increased. Per verbal from txp coordinator Mari MORALES, instructed pt to take 2 mg tonight and will discuss with pt's txp coordinator tomorrow for dosing going forward. Pt verbalized understanding and has no further questions.

## 2018-01-09 ENCOUNTER — TELEPHONE (OUTPATIENT)
Dept: TRANSPLANT | Facility: CLINIC | Age: 53
End: 2018-01-09

## 2018-01-09 NOTE — TELEPHONE ENCOUNTER
Phone message left for rupesh Sauer: immunosuppression plan, to resume previous plan prior to hospitalization.  Low dose tacrolimus , myfortic and prednisone.  Requested call back to review.

## 2018-01-10 LAB — COPATH REPORT: NORMAL

## 2018-01-16 ENCOUNTER — OFFICE VISIT (OUTPATIENT)
Dept: VASCULAR SURGERY | Facility: CLINIC | Age: 53
End: 2018-01-16
Payer: COMMERCIAL

## 2018-01-16 VITALS
HEART RATE: 84 BPM | OXYGEN SATURATION: 99 % | RESPIRATION RATE: 16 BRPM | SYSTOLIC BLOOD PRESSURE: 176 MMHG | DIASTOLIC BLOOD PRESSURE: 92 MMHG

## 2018-01-16 DIAGNOSIS — Z94.4 LIVER REPLACED BY TRANSPLANT (H): ICD-10-CM

## 2018-01-16 DIAGNOSIS — I99.8 ISCHEMIA OF DIGITS OF HAND: Primary | ICD-10-CM

## 2018-01-16 DIAGNOSIS — N18.4 CHRONIC KIDNEY DISEASE, STAGE 4 (SEVERE) (H): ICD-10-CM

## 2018-01-16 DIAGNOSIS — N18.30 ANEMIA IN STAGE 3 CHRONIC KIDNEY DISEASE (H): ICD-10-CM

## 2018-01-16 DIAGNOSIS — D63.1 ANEMIA IN STAGE 3 CHRONIC KIDNEY DISEASE (H): ICD-10-CM

## 2018-01-16 DIAGNOSIS — N18.30 CKD (CHRONIC KIDNEY DISEASE) STAGE 3, GFR 30-59 ML/MIN (H): ICD-10-CM

## 2018-01-16 LAB
ALBUMIN SERPL-MCNC: 2.8 G/DL (ref 3.4–5)
ALP SERPL-CCNC: 115 U/L (ref 40–150)
ALT SERPL W P-5'-P-CCNC: 13 U/L (ref 0–70)
ANION GAP SERPL CALCULATED.3IONS-SCNC: 7 MMOL/L (ref 3–14)
AST SERPL W P-5'-P-CCNC: 20 U/L (ref 0–45)
BILIRUB DIRECT SERPL-MCNC: 0.2 MG/DL (ref 0–0.2)
BILIRUB SERPL-MCNC: 0.8 MG/DL (ref 0.2–1.3)
BUN SERPL-MCNC: 21 MG/DL (ref 7–30)
CALCIUM SERPL-MCNC: 7.7 MG/DL (ref 8.5–10.1)
CHLORIDE SERPL-SCNC: 106 MMOL/L (ref 94–109)
CO2 SERPL-SCNC: 30 MMOL/L (ref 20–32)
CREAT SERPL-MCNC: 1.14 MG/DL (ref 0.66–1.25)
ERYTHROCYTE [DISTWIDTH] IN BLOOD BY AUTOMATED COUNT: 14.3 % (ref 10–15)
FERRITIN SERPL-MCNC: 973 NG/ML (ref 26–388)
GFR SERPL CREATININE-BSD FRML MDRD: 67 ML/MIN/1.7M2
GLUCOSE SERPL-MCNC: 175 MG/DL (ref 70–99)
HCT VFR BLD AUTO: 24.5 % (ref 40–53)
HGB BLD-MCNC: 7.8 G/DL (ref 13.3–17.7)
IRON SATN MFR SERPL: 23 % (ref 15–46)
IRON SERPL-MCNC: 37 UG/DL (ref 35–180)
MAGNESIUM SERPL-MCNC: 1.8 MG/DL (ref 1.6–2.3)
MCH RBC QN AUTO: 30 PG (ref 26.5–33)
MCHC RBC AUTO-ENTMCNC: 31.8 G/DL (ref 31.5–36.5)
MCV RBC AUTO: 94 FL (ref 78–100)
PHOSPHATE SERPL-MCNC: 2.7 MG/DL (ref 2.5–4.5)
PLATELET # BLD AUTO: 117 10E9/L (ref 150–450)
POTASSIUM SERPL-SCNC: 3.6 MMOL/L (ref 3.4–5.3)
PROT SERPL-MCNC: 6.9 G/DL (ref 6.8–8.8)
RBC # BLD AUTO: 2.6 10E12/L (ref 4.4–5.9)
SODIUM SERPL-SCNC: 143 MMOL/L (ref 133–144)
TACROLIMUS BLD-MCNC: <3 UG/L (ref 5–15)
TIBC SERPL-MCNC: 159 UG/DL (ref 240–430)
TME LAST DOSE: ABNORMAL H
WBC # BLD AUTO: 3.8 10E9/L (ref 4–11)

## 2018-01-16 ASSESSMENT — PAIN SCALES - GENERAL: PAINLEVEL: NO PAIN (0)

## 2018-01-16 NOTE — NURSING NOTE
Chief Complaint   Patient presents with     RECHECK     Follow up great toe on right foot        Vitals:    01/16/18 1104   BP: (!) 176/92   BP Location: Left arm   Pulse: 84   Resp: 16   SpO2: 99%       There is no height or weight on file to calculate BMI.         Leela Villasenor LPN

## 2018-01-16 NOTE — PROGRESS NOTES
VASCULAR SURGERY CLINIC ESTABLISHED PATIENT NOTE    HPI:    Camacho Bhagat is a 52 year old male with past medical history of CASTA[place]EDA cirrhosis status post liver transplant in March, who was hospitalized in July for sepsis secondary to typhlitis on vasopressor support and developed gangrene of his right index finger and right great toe.  He returns to clinic today for wound check.         SUBJECTIVE:  Reports he is recovering well after recent takedown of ileostomy.  Denies any recent fever, chills, night sweats or toe wound drainage.  Continuing to apply betadine to right great toe daily.     OBJECTIVE:  Vital signs:  BP (!) 176/92 (BP Location: Left arm)  Pulse 84  Resp 16  SpO2 99%      Prior to Admission medications    Medication Sig Start Date End Date Taking? Authorizing Provider   oxyCODONE IR (ROXICODONE) 5 MG tablet Take 1 tablet (5 mg) by mouth every 4 hours as needed for pain maximum 6 tablet(s) per day 1/7/18   Sara Dinh MD   polyethylene glycol (MIRALAX) powder Take 17 g (1 capful) by mouth daily 1/7/18   Sara Dinh MD   sodium bicarbonate 650 MG tablet Take 2 tablets (1,300 mg) by mouth 3 times daily 12/20/17   Ninfa Hernández MD   cholecalciferol (VITAMIN D3) 1000 UNIT tablet Take 1 tablet (1,000 Units) by mouth daily 12/6/17   Cinda Cazares NP   predniSONE (DELTASONE) 5 MG tablet Take 1 tablet (5 mg) by mouth daily 11/21/17   Toñito Camejo MD   mycophenolic acid (GENERIC EQUIVALENT) 360 MG EC tablet Take 2 tablets (720 mg) by mouth every 12 hours 11/20/17   Jovan Tran MD   patiromer (VELTASSA) 8.4 G packet Take 8.4 g by mouth daily 11/17/17   Ninfa Hernández MD   magnesium oxide (MAG-OX) 400 (241.3 MG) MG tablet Take 1 tablet (400 mg) by mouth 2 times daily  Patient taking differently: Take 400 mg by mouth daily  11/14/17   Cinda Cazares NP   tacrolimus (GENERIC EQUIVALENT) 1 MG capsule Take 1 capsule (1 mg) by mouth every 12 hours  11/9/17   Toñito Camejo MD   fludrocortisone (FLORINEF) 0.1 MG tablet Take 3 tablets (0.3 mg) by mouth daily 11/8/17   Ninfa Hernández MD   amLODIPine (NORVASC) 10 MG tablet Take 1 tablet (10 mg) by mouth daily  Patient taking differently: Take 10 mg by mouth every morning  11/7/17   Ninfa Hernández MD   oxyCODONE IR (ROXICODONE) 10 MG tablet Take 1 tablet (10 mg) by mouth daily as needed for moderate to severe pain 11/6/17   Shay Kirkpatrick MD   CIALIS 5 MG tablet Take 5 mg by mouth as needed  6/7/17   Reported, Patient   furosemide (LASIX) 20 MG tablet Take 1 tablet (20 mg) by mouth 2 times daily 11/1/17   Cinda Cazares, NP   OMEPRAZOLE PO Take 20 mg by mouth every morning     Reported, Patient   GABAPENTIN PO Take 300 mg by mouth 3 times daily    Reported, Patient   insulin glargine (LANTUS) 100 UNIT/ML injection Inject 50 Units Subcutaneous every morning    Reported, Patient   linagliptin (TRADJENTA) 5 MG TABS tablet Take 5 mg by mouth every evening     Reported, Patient   loperamide (IMODIUM) 2 MG capsule Take 2 capsules (4 mg) by mouth 2 times daily  Patient taking differently: Take 2 mg by mouth 2 times daily as needed  9/8/17   Felicia Tolliver APRN CNP   cyclobenzaprine (FLEXERIL) 10 MG tablet Take 1 tablet (10 mg) by mouth 3 times daily as needed for muscle spasms 9/8/17   Felicia Tolliver APRN CNP       PHYSICAL EXAM:  NEURO/PSYCH: The patient is alert and oriented.  Appropriate.  Moves all extremities.   SKIN: warm, dry.  PULMONARY: unlabored breathing  EXTREMITIES: right index finger completely healed, right great toe improved healing since last visit        ASSESSMENT:  Patient Active Problem List   Diagnosis     Carpal tunnel syndrome     Testicular hypofunction     Fibromyalgia     Sicca syndrome (H)     Medication refill- do not delete      Hepatocellular carcinoma (H)     Osteoarthritis     Rheumatoid arthritis (H)     Hyperkalemia     Liver transplant recipient (H)      Immunosuppressed status (H)     Neutropenic colitis (H)     S/P liver transplant (H)     Pancytopenia (H)     Gangrene of finger (H)     Ischemia of both lower extremities     Elevated serum creatinine     History of creation of ostomy     Peristomal skin complication     Painful periwound skin     Encounter for attention to ileostomy (H)     Ileostomy in place (H)         PLAN:  - right great toe healing nicely, small area debrided back. No signs of infection, healthy tissue.  Anticipate toe to completely heal in next few weeks   - continue betadine paint to right great toe  - follow up with vascular surgery prn    Sabine KIMBLE, CNS  Division of Vascular Surgery  [facility]  Pager [phone]

## 2018-01-16 NOTE — MR AVS SNAPSHOT
After Visit Summary   1/16/2018    Camacho Bhagat    MRN: [id number]           Patient Information     Date Of Birth          1965        Visit Information        Provider Department      1/16/2018 11:00 AM Sabine Monroy APRN [facility]        Care Instructions    Continue Betadine paint to right great toe daily.      Follow up with vascular surgery prn           Follow-ups after your visit        Follow-up notes from your care team     Return if symptoms worsen or fail to improve.      Your next 10 appointments already scheduled     Jan 19, 2018 11:00 AM CST   (Arrive by 10:45 AM)   Post-Op with LAMIN Cabrera CNP   [facility] Rectal Surgery ([facility])    [address]  4th Floor  [place] [address] [phone]            Jan 31, 2018  1:00 PM CST   (Arrive by 12:30 PM)   Return Visit with Cinda Cazares NP   [facility] Nephrology ([facility])    [address]  [address]  [place] [facility] [address] [phone]            Mar 02, 2018 10:45 AM CST   (Arrive by 10:30 AM)   Return Liver Transplant with Toñito Camejo MD   [facility] Hepatology ([facility])    [address] [place] [address]  [address]  [facility] [address] [phone]              Who to contact     Please call your clinic at [phone] to:    Ask questions about your health    Make or cancel appointments    Discuss your medicines    Learn about your test results    Speak to your doctor   If you have compliments or concerns about an experience at your clinic, or if you wish to file a complaint, please contact [facility] Patient Relations at [phone] or email us at [email or web address]         Additional Information About Your Visit        MyChart Information     [employer] gives you secure access to your electronic health record. If you see  a primary care provider, you can also send messages to your care team and make appointments. If you have questions, please call your primary care clinic.  If you do not have a primary care provider, please call [phone] and they will assist you.      [employer] is an electronic gateway that provides easy, online access to your medical records. With [employer], you can request a clinic appointment, read your test results, renew a prescription or communicate with your care team.     To access your existing account, please contact your [facility] or call [phone] for assistance.        Care EveryWhere ID     This is your Care EveryWhere ID. This could be used by other organizations to access your [facility] medical records  [id number]        Your Vitals Were     Pulse Respirations Pulse Oximetry             84 16 99%          Blood Pressure from Last 3 Encounters:   01/16/18 (!) 176/92   01/07/18 157/71   12/13/17 170/87    Weight from Last 3 Encounters:   01/05/18 97.8 kg (215 lb 9.8 oz)   12/13/17 86.2 kg (190 lb 0.6 oz)   12/13/17 86.2 kg (190 lb)              Today, you had the following     No orders found for display         Today's Medication Changes          These changes are accurate as of: 1/16/18 11:20 AM.  If you have any questions, ask your nurse or doctor.               These medicines have changed or have updated prescriptions.        Dose/Directions    amLODIPine 10 MG tablet   Commonly known as:  NORVASC   This may have changed:  when to take this   Used for:  HTN (hypertension)        Dose:  10 mg   Take 1 tablet (10 mg) by mouth daily   Quantity:  90 tablet   Refills:  3       loperamide 2 MG capsule   Commonly known as:  IMODIUM   This may have changed:    - how much to take  - when to take this  - reasons to take this   Used for:  S/P right hemicolectomy        Dose:  4 mg   Take 2 capsules (4 mg) by mouth 2 times daily   Quantity:  120 capsule   Refills:  3        magnesium oxide 400 (241.3 MG) MG tablet   Commonly known as:  MAG-OX   This may have changed:  when to take this   Used for:  Hypomagnesemia, CKD (chronic kidney disease) stage 3, GFR 30-59 ml/min        Dose:  400 mg   Take 1 tablet (400 mg) by mouth 2 times daily   Quantity:  180 tablet   Refills:  3                Primary Care Provider Office Phone # Fax #    Shay Kirkpatrick [phone]       [address] [place] [id number]        Equal Access to Services     [facility] : Hadii tavo ku hadasho Soomaali, waaxda luqadaha, qaybta kaalmada adeegyamachelle, devi craig . So [facility] [phone].    ATENCIÓN: Si habla español, tiene a zheng disposición servicios gratuitos de asistencia lingüística. [facility] [phone].    We comply with applicable federal civil rights laws and [place] laws. We do not discriminate on the basis of race, color, national origin, age, disability, sex, sexual orientation, or gender identity.            Thank you!     Thank you for choosing [facility]  for your care. Our goal is always to provide you with excellent care. Hearing back from our patients is one way we can continue to improve our services. Please take a few minutes to complete the written survey that you may receive in the mail after your visit with us. Thank you!             Your Updated Medication List - Protect others around you: Learn how to safely use, store and throw away your medicines at www.disposemymeds.org.          This list is accurate as of: 1/16/18 11:20 AM.  Always use your most recent med list.                   Brand Name Dispense Instructions for use Diagnosis    amLODIPine 10 MG tablet    NORVASC    90 tablet    Take 1 tablet (10 mg) by mouth daily    HTN (hypertension)       cholecalciferol 1000 UNIT tablet    vitamin D3    100 tablet    Take 1 tablet (1,000 Units) by mouth daily    Vitamin D deficiency       CIALIS 5 MG tablet   Generic drug:   tadalafil      Take 5 mg by mouth as needed        cyclobenzaprine 10 MG tablet    FLEXERIL    90 tablet    Take 1 tablet (10 mg) by mouth 3 times daily as needed for muscle spasms    Immunosuppression (H)       fludrocortisone 0.1 MG tablet    FLORINEF    90 tablet    Take 3 tablets (0.3 mg) by mouth daily    Hyperkalemia       furosemide 20 MG tablet    LASIX    60 tablet    Take 1 tablet (20 mg) by mouth 2 times daily    Hyperkalemia       GABAPENTIN PO      Take 300 mg by mouth 3 times daily        insulin glargine 100 UNIT/ML injection    LANTUS     Inject 50 Units Subcutaneous every morning        linagliptin 5 MG Tabs tablet    TRADJENTA     Take 5 mg by mouth every evening        loperamide 2 MG capsule    IMODIUM    120 capsule    Take 2 capsules (4 mg) by mouth 2 times daily    S/P right hemicolectomy       magnesium oxide 400 (241.3 MG) MG tablet    MAG-OX    180 tablet    Take 1 tablet (400 mg) by mouth 2 times daily    Hypomagnesemia, CKD (chronic kidney disease) stage 3, GFR 30-59 ml/min       mycophenolic acid 360 MG EC tablet    GENERIC EQUIVALENT    120 tablet    Take 2 tablets (720 mg) by mouth every 12 hours    History of liver transplant (H), Long-term use of immunosuppressant medication       OMEPRAZOLE PO      Take 20 mg by mouth every morning        * oxyCODONE IR 10 MG tablet    ROXICODONE    30 tablet    Take 1 tablet (10 mg) by mouth daily as needed for moderate to severe pain    Osteoarthritis       * oxyCODONE IR 5 MG tablet    ROXICODONE    30 tablet    Take 1 tablet (5 mg) by mouth every 4 hours as needed for pain maximum 6 tablet(s) per day    Ileostomy in place (H)       patiromer 8.4 G packet    VELTASSA    30 each    Take 8.4 g by mouth daily    Hyperkalemia       polyethylene glycol powder    MIRALAX    510 g    Take 17 g (1 capful) by mouth daily    Ileostomy in place (H)       predniSONE 5 MG tablet    DELTASONE    30 tablet    Take 1 tablet (5 mg) by mouth daily    Liver  transplant recipient (H), Long-term use of immunosuppressant medication       sodium bicarbonate 650 MG tablet     180 tablet    Take 2 tablets (1,300 mg) by mouth 3 times daily    Hyperkalemia       tacrolimus 1 MG capsule    GENERIC EQUIVALENT    60 capsule    Take 1 capsule (1 mg) by mouth every 12 hours    Liver transplant recipient (H)       * Notice:  This list has 2 medication(s) that are the same as other medications prescribed for you. Read the directions carefully, and ask your doctor or other care provider to review them with you.

## 2018-01-16 NOTE — LETTER
1/16/2018       RE: Camacho Bhagat  [address] [facility] [address]     Dear Colleague,    Thank you for referring your patient, Camacho Bhagat, to the [facility] at [facility]. Please see a copy of my visit note below.    VASCULAR SURGERY CLINIC ESTABLISHED PATIENT NOTE    HPI:    Camacho Bhagat is a 52 year old male with past medical history of CASTAÑEDA cirrhosis status post liver transplant in March, who was hospitalized in July for sepsis secondary to typhlitis on vasopressor support and developed gangrene of his right index finger and right great toe.  He returns to clinic today for wound check.         SUBJECTIVE:  Reports he is recovering well after recent takedown of ileostomy.  Denies any recent fever, chills, night sweats or toe wound drainage.  Continuing to apply betadine to right great toe daily.     OBJECTIVE:  Vital signs:  BP (!) 176/92 (BP Location: Left arm)  Pulse 84  Resp 16  SpO2 99%      Prior to Admission medications    Medication Sig Start Date End Date Taking? Authorizing Provider   oxyCODONE IR (ROXICODONE) 5 MG tablet Take 1 tablet (5 mg) by mouth every 4 hours as needed for pain maximum 6 tablet(s) per day 1/7/18   Sara Dinh MD   polyethylene glycol (MIRALAX) powder Take 17 g (1 capful) by mouth daily 1/7/18   Sara Dinh MD   sodium bicarbonate 650 MG tablet Take 2 tablets (1,300 mg) by mouth 3 times daily 12/20/17   Ninfa Hernández MD   cholecalciferol (VITAMIN D3) 1000 UNIT tablet Take 1 tablet (1,000 Units) by mouth daily 12/6/17   Cinda Cazares NP   predniSONE (DELTASONE) 5 MG tablet Take 1 tablet (5 mg) by mouth daily 11/21/17   Toñito Camejo MD   mycophenolic acid (GENERIC EQUIVALENT) 360 MG EC tablet Take 2 tablets (720 mg) by mouth every 12 hours 11/20/17   Jovan Tran MD   patiromer (VELTASSA) 8.4 G packet Take 8.4 g by mouth daily 11/17/17   Ninfa Hernández MD    magnesium oxide (MAG-OX) 400 (241.3 MG) MG tablet Take 1 tablet (400 mg) by mouth 2 times daily  Patient taking differently: Take 400 mg by mouth daily  11/14/17   Cinda Cazares NP   tacrolimus (GENERIC EQUIVALENT) 1 MG capsule Take 1 capsule (1 mg) by mouth every 12 hours 11/9/17   Toñito Camejo MD   fludrocortisone (FLORINEF) 0.1 MG tablet Take 3 tablets (0.3 mg) by mouth daily 11/8/17   Ninfa Hernández MD   amLODIPine (NORVASC) 10 MG tablet Take 1 tablet (10 mg) by mouth daily  Patient taking differently: Take 10 mg by mouth every morning  11/7/17   Ninfa Hernández MD   oxyCODONE IR (ROXICODONE) 10 MG tablet Take 1 tablet (10 mg) by mouth daily as needed for moderate to severe pain 11/6/17   Shay Kirkpatrick MD   CIALIS 5 MG tablet Take 5 mg by mouth as needed  6/7/17   Reported, Patient   furosemide (LASIX) 20 MG tablet Take 1 tablet (20 mg) by mouth 2 times daily 11/1/17   Cinda Cazares NP   OMEPRAZOLE PO Take 20 mg by mouth every morning     Reported, Patient   GABAPENTIN PO Take 300 mg by mouth 3 times daily    Reported, Patient   insulin glargine (LANTUS) 100 UNIT/ML injection Inject 50 Units Subcutaneous every morning    Reported, Patient   linagliptin (TRADJENTA) 5 MG TABS tablet Take 5 mg by mouth every evening     Reported, Patient   loperamide (IMODIUM) 2 MG capsule Take 2 capsules (4 mg) by mouth 2 times daily  Patient taking differently: Take 2 mg by mouth 2 times daily as needed  9/8/17   Felicia Tolliver APRN CNP   cyclobenzaprine (FLEXERIL) 10 MG tablet Take 1 tablet (10 mg) by mouth 3 times daily as needed for muscle spasms 9/8/17   Felicia Tolliver APRN CNP       PHYSICAL EXAM:  NEURO/PSYCH: The patient is alert and oriented.  Appropriate.  Moves all extremities.   SKIN: warm, dry.  PULMONARY: unlabored breathing  EXTREMITIES: right index finger completely healed, right great toe improved healing since last visit        ASSESSMENT:  Patient Active Problem List    Diagnosis     Carpal tunnel syndrome     Testicular hypofunction     Fibromyalgia     Sicca syndrome (H)     Medication refill- do not delete      Hepatocellular carcinoma (H)     Osteoarthritis     Rheumatoid arthritis (H)     Hyperkalemia     Liver transplant recipient (H)     Immunosuppressed status (H)     Neutropenic colitis (H)     S/P liver transplant (H)     Pancytopenia (H)     Gangrene of finger (H)     Ischemia of both lower extremities     Elevated serum creatinine     History of creation of ostomy     Peristomal skin complication     Painful periwound skin     Encounter for attention to ileostomy (H)     Ileostomy in place (H)         PLAN:  - right great toe healing nicely, small area debrided back. No signs of infection, healthy tissue.  Anticipate toe to completely heal in next few weeks   - continue betadine paint to right great toe  - follow up with vascular surgery prchristal KIMBLE, CNS  Division of Vascular Surgery  [facility]  Pager [phone]

## 2018-01-17 NOTE — TELEPHONE ENCOUNTER
Spoke with Camacho, about his hgb, decreased slightly from 9.1 to 7.8,  He states he has had not signs or symptoms of bleeding. He feels well, has been eating well.    Labs good.   Reminded him that tacrolimus level is low, intentionally, plan to discuss immunosuppression with  at [facility] in March.

## 2018-01-19 ENCOUNTER — OFFICE VISIT (OUTPATIENT)
Dept: SURGERY | Facility: CLINIC | Age: 53
End: 2018-01-19
Payer: COMMERCIAL

## 2018-01-19 VITALS
BODY MASS INDEX: 30.49 KG/M2 | TEMPERATURE: 98.8 F | SYSTOLIC BLOOD PRESSURE: 166 MMHG | DIASTOLIC BLOOD PRESSURE: 85 MMHG | WEIGHT: 225.1 LBS | OXYGEN SATURATION: 95 % | HEART RATE: 84 BPM | HEIGHT: 72 IN

## 2018-01-19 DIAGNOSIS — Z93.2 ILEOSTOMY STATUS (H): ICD-10-CM

## 2018-01-19 DIAGNOSIS — Z09 FOLLOW-UP EXAMINATION FOLLOWING SURGERY: Primary | ICD-10-CM

## 2018-01-19 ASSESSMENT — PAIN SCALES - GENERAL: PAINLEVEL: SEVERE PAIN (7)

## 2018-01-19 NOTE — LETTER
2018      RE: Camacho Bhagat  [address] [facility] [address]       Colon and Rectal Surgery Postoperative Clinic Note    RE: Camacho Bhagat  : 1965  BISI: 2018    Camacho Bhagat is a very pleasant 52 year old male status post liver transplant who underwent exploratory laparotomy, lysis of adhesions, right hemicolectomy, end ileostomy, mucous fistula, and partial omentectomy on 2017 for retroperitoneal pericolic air with sepsis after colonoscopy.  He is now status post ileostomy and mucous fistula takedowns with primary anastomosis on 2018 with Dr. Dinh.  His postoperative course was uneventful and he was discharged to home on 2018.  He presents today for follow-up.    Interval history: Camacho has been doing well since returning home.  He is not needing any pain medications.  He is having 2-4 fairly loose bowel movements a day.  He does have some gas pain but this passes quickly.  He denies any nausea or vomiting and is tolerating a low fiber diet.  He is packing his takedown site daily and feels this is healing well.  He denies any fevers or chills.                                                 Assessment/Plan:  52 year old male status post ileostomy and mucous fistula takedowns with primary anastomosis on 2018 with Dr. Dinh.  He is recovering quite well from surgery.  Takedown site is healing well.  He can continue to pack this with the corner of a 4 x 4 gauze.  Staples were removed today.  There is one small opening in his midline incision without any signs of infection.  We will have him pack this with quarter inch Nu Gauze daily.  Continue on a low residue diet for another 2-3 weeks and then he can slowly add fiber back into his diet.  No lifting more than 10 pounds for full 6 weeks from surgery.  We will have him follow-up with Dr. Dinh in the next 2-3 weeks as well.  Encouraged him to contact the clinic in the meantime with any questions or  concerns.  Patient's questions were answered to his stated satisfaction and he is in agreement with this plan.    Medical history:  Past Medical History:   Diagnosis Date     Depressive disorder, not elsewhere classified      Diabetes type 2, controlled (H) 11/10/2016     Esophageal reflux      Fibromyalgia 1/2009    dx with Dr Benitez( [facility])     Gangrene of finger (H) 8/25/2017     H/O deep venous thrombosis 11/2001    Permanent IVC filter in place.     H/O CASTAÑEDA (nonalcoholic steatohepatitis)      H/O Pneumonia, organism unspecified(486) 10/2001    Included ARDS, sepsis, and  acute renal failure; hospitalized, required tracheostomy placement.     H/O: HTN (hypertension) 11/2001    No longer prescribed antihypertensive medication.       History of hepatocellular carcinoma      History of liver transplant (H)      History of obstructive sleep apnea     No longer wears CPAP since losing approximately 200 pounds with his liver transplant and its complications.       HLD (hyperlipidemia)      Ischemia of both lower extremities 8/25/2017    Distal ischemia due to shock/high pressor requirements     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     Osteoarthritis      Presence of PERMANENT IVC filter      Rheumatoid arthritis(714.0)        Surgical history:  Past Surgical History:   Procedure Laterality Date     BENCH LIVER N/A 3/4/2017    Procedure: BENCH LIVER;  Surgeon: Jovan Tran MD;  Location: [facility] TOTAL KNEE ARTHROPLASTY  2008    Right knee arthroscopy     CHOLECYSTECTOMY       COLONOSCOPY N/A 7/21/2017    Procedure: COMBINED COLONOSCOPY, SINGLE OR MULTIPLE BIOPSY/POLYPECTOMY BY BIOPSY;  Colonoscopy;  Surgeon: Izaiah Montes MD;  Location:  GI     ENT SURGERY      Repair of deviated septum     ESOPHAGOSCOPY, GASTROSCOPY, DUODENOSCOPY (EGD), COMBINED N/A 8/4/2016    Procedure: COMBINED ESOPHAGOSCOPY, GASTROSCOPY, DUODENOSCOPY (EGD), BIOPSY SINGLE OR MULTIPLE;  Surgeon: Trent Pederson MD;  Location:  [facility]     ESOPHAGOSCOPY, GASTROSCOPY, DUODENOSCOPY (EGD), COMBINED N/A 2017    Procedure: COMBINED ESOPHAGOSCOPY, GASTROSCOPY, DUODENOSCOPY (EGD);;  Surgeon: Los Wynn MD;  Location: [facility] GI     LAPAROTOMY EXPLORATORY N/A 2017    Procedure: LAPAROTOMY EXPLORATORY;  Exploratory Laparotomy, washout;  Surgeon: Tip Zhang MD;  Location: [facility] OR     LAPAROTOMY EXPLORATORY N/A 2017    Procedure: LAPAROTOMY EXPLORATORY;  Exploratory Laparotomy, Washout with closure.;  Surgeon: Tip Zhang MD;  Location: [facility]     LAPAROTOMY EXPLORATORY N/A 2017    Procedure: LAPAROTOMY EXPLORATORY;  Exploratory Laparotomy, Right angelique-colectomy, end ileostomy, mucosal fistula, partial omentectomy;  Surgeon: Sara Dinh MD;  Location: [facility] OR     ORTHOPEDIC SURGERY Right     Repair of dislocated wrist.       RELEASE TRIGGER FINGER Right 11/10/2017    Procedure: RELEASE TRIGGER FINGER;  Debride, V-Y Flap Right Index Finger;  Surgeon: Momo Noonan MD;  [facility]     TAKEDOWN ILEOSTOMY N/A 2018    Procedure: TAKEDOWN ILEOSTOMY;  Exploratory Laparotomy, Lysis of Adhesions, Takedown Of End Ileostomy, Takedown of mucocutaneous fistula, ileocolic resection  And Colorectal Anastomosis, Primary repair of Ventral Hernia, Anesthesia Block ;  Surgeon: Sara Dinh MD;  Location: [facility] OR     TRACHEOSTOMY  2001    During hospitalization for pneumonia.       TRANSPLANT LIVER RECIPIENT  DONOR N/A 3/4/2017    Procedure: TRANSPLANT LIVER RECIPIENT  DONOR;  Surgeon: Jovan Tran MD;  Location: [facility] OR       Problem list:    Patient Active Problem List    Diagnosis Date Noted     Ileostomy in place (H) 2018     Priority: Medium     Peristomal skin complication 2017     Priority: Medium     Painful periwound skin 2017     Priority: Medium     Encounter for attention to ileostomy (H) 2017     Priority: Medium     History of  creation of ostomy 10/30/2017     Priority: Medium     Elevated serum creatinine 10/16/2017     Priority: Medium     Pancytopenia (H) 08/25/2017     Priority: Medium     Gangrene of finger (H) 08/25/2017     Priority: Medium     Ischemia of both lower extremities 08/25/2017     Priority: Medium     Distal ischemia due to shock/high pressor requirements       S/P liver transplant (H) 07/26/2017     Priority: Medium     Neutropenic colitis (H) 07/04/2017     Priority: Medium     Immunosuppressed status (H) 03/10/2017     Priority: Medium     Liver transplant recipient (H) 03/04/2017     Priority: Medium     Hyperkalemia 02/14/2017     Priority: Medium     Hepatocellular carcinoma (H) 01/27/2016     Priority: Medium     Osteoarthritis 01/18/2015     Priority: Medium     Rheumatoid arthritis (H) 01/18/2015     Priority: Medium     Medication refill- do not delete  11/13/2013     Priority: Medium     Fibromyalgia 08/15/2011     Priority: Medium     Sicca syndrome (H) 08/15/2011     Priority: Medium     Testicular hypofunction      Priority: Medium     Problem list name updated by automated process. Provider to review       Carpal tunnel syndrome 07/08/2002     Priority: Medium       Medications:  Current Outpatient Prescriptions   Medication Sig Dispense Refill     oxyCODONE IR (ROXICODONE) 5 MG tablet Take 1 tablet (5 mg) by mouth every 4 hours as needed for pain maximum 6 tablet(s) per day 30 tablet 0     polyethylene glycol (MIRALAX) powder Take 17 g (1 capful) by mouth daily 510 g 1     sodium bicarbonate 650 MG tablet Take 2 tablets (1,300 mg) by mouth 3 times daily 180 tablet 3     cholecalciferol (VITAMIN D3) 1000 UNIT tablet Take 1 tablet (1,000 Units) by mouth daily 100 tablet 3     predniSONE (DELTASONE) 5 MG tablet Take 1 tablet (5 mg) by mouth daily 30 tablet 11     mycophenolic acid (GENERIC EQUIVALENT) 360 MG EC tablet Take 2 tablets (720 mg) by mouth every 12 hours 120 tablet 3     patiromer (VELTASSA) 8.4  G packet Take 8.4 g by mouth daily 30 each 3     magnesium oxide (MAG-OX) 400 (241.3 MG) MG tablet Take 1 tablet (400 mg) by mouth 2 times daily (Patient taking differently: Take 400 mg by mouth daily ) 180 tablet 3     tacrolimus (GENERIC EQUIVALENT) 1 MG capsule Take 1 capsule (1 mg) by mouth every 12 hours 60 capsule 11     fludrocortisone (FLORINEF) 0.1 MG tablet Take 3 tablets (0.3 mg) by mouth daily 90 tablet 3     amLODIPine (NORVASC) 10 MG tablet Take 1 tablet (10 mg) by mouth daily (Patient taking differently: Take 10 mg by mouth every morning ) 90 tablet 3     oxyCODONE IR (ROXICODONE) 10 MG tablet Take 1 tablet (10 mg) by mouth daily as needed for moderate to severe pain 30 tablet 0     CIALIS 5 MG tablet Take 5 mg by mouth as needed   1     furosemide (LASIX) 20 MG tablet Take 1 tablet (20 mg) by mouth 2 times daily 60 tablet 3     OMEPRAZOLE PO Take 20 mg by mouth every morning        GABAPENTIN PO Take 300 mg by mouth 3 times daily       insulin glargine (LANTUS) 100 UNIT/ML injection Inject 50 Units Subcutaneous every morning       linagliptin (TRADJENTA) 5 MG TABS tablet Take 5 mg by mouth every evening        loperamide (IMODIUM) 2 MG capsule Take 2 capsules (4 mg) by mouth 2 times daily (Patient taking differently: Take 2 mg by mouth 2 times daily as needed ) 120 capsule 3     cyclobenzaprine (FLEXERIL) 10 MG tablet Take 1 tablet (10 mg) by mouth 3 times daily as needed for muscle spasms 90 tablet 0       Allergies:  Allergies   Allergen Reactions     Erythromycin GI Disturbance     Vioxx      Nausea, vomiting       Family history:  Family History   Problem Relation Age of Onset     DIABETES Father      Hypertension Father      Substance Abuse Father      Arthritis Mother      Thyroid Cancer Mother      Survivor!     Cervical Cancer Maternal Grandmother      CEREBROVASCULAR DISEASE Maternal Grandfather      Prostate Cancer Paternal Grandfather      Colon Cancer No family hx of      Hyperlipidemia  No family hx of      Coronary Artery Disease No family hx of      Breast Cancer No family hx of        Social history:  Social History   Substance Use Topics     Smoking status: Former Smoker     Packs/day: 0.75     Years: 2.00     Quit date: 1988     Smokeless tobacco: Former User     Types: Chew     Quit date: 10/8/2015     Alcohol use No      Comment: No alcohol since liver transplant.       Marital status: .    Nursing Notes:   Yonathan Vasquez LPN  1/19/2018 11:01 AM  Signed  Chief Complaint   Patient presents with     Surgical Followup     Post op visit.        Vitals:    01/19/18 1058   BP: 166/85   BP Location: Left arm   Patient Position: Chair   Cuff Size: Adult Large   Pulse: 84   Temp: 98.8  F (37.1  C)   TempSrc: Oral   SpO2: 95%   Weight: 225 lb 1.6 oz   Height: 6'       Body mass index is 30.53 kg/(m^2).    Blayne HENRY LPN                     Physical Examination: Exam was chaperoned by Blayne Vasquez LPN  /85 (BP Location: Left arm, Patient Position: Chair, Cuff Size: Adult Large)  Pulse 84  Temp 98.8  F (37.1  C) (Oral)  Ht 6'  Wt 225 lb 1.6 oz  SpO2 95%  BMI 30.53 kg/m2  General: Alert, oriented, in no acute distress, sitting comfortably  HEENT: Mucous membranes moist  Abdomen: Soft, nondistended, nontender on palpation.  Midline incision with staples in place.  Staples removed and there is one small opening in the middle of the incision with good granulation tissue in the base of the wound and no purulent drainage.  A small amount of serous sanguinous drainage was expressed.  No surrounding erythema.  This was packed with quarter inch Nu Gauze.  Takedown site with good granulation tissue in the base of the wound.  Minimal slough and this was debrided out.  This was packed with quarter of a 4 x 4 gauze.  Extremities: Warm, dry, no edema    Total face to face time was 15 minutes, >50% counseling.  This is a postop visit.    LAMIN Queen, NP-C  Colon and Rectal  Surgery  [facility]    This note was created using speech recognition software and may contain unintended word substitutions.        LAMIN Lozano CNP

## 2018-01-19 NOTE — MR AVS SNAPSHOT
After Visit Summary   1/19/2018    Camacho Bhagat    MRN: [id number]           Patient Information     Date Of Birth          1965        Visit Information        Provider Department      1/19/2018 11:00 AM Caryn Parikh APRN CNP [facility] Colon and Rectal Surgery        Today's Diagnoses     Follow-up examination following surgery    -  1    Ileostomy status (H)           Follow-ups after your visit        Your next 10 appointments already scheduled     Jan 31, 2018  1:00 PM CST   (Arrive by 12:30 PM)   Return Visit with Cinda Cazares NP   [facility] Nephrology ([facility])    [address]  [address]  [place] [address] [phone]            Mar 02, 2018 10:45 AM CST   (Arrive by 10:30 AM)   Return Liver Transplant with Toñito Camejo MD   [facility] Hepatology ([facility])    [address]  [address]  [place] [address] [phone]              Who to contact     Please call your clinic at [phone] to:    Ask questions about your health    Make or cancel appointments    Discuss your medicines    Learn about your test results    Speak to your doctor   If you have compliments or concerns about an experience at your clinic, or if you wish to file a complaint, please contact [facility] Patient Relations at [phone] or email us at [email or web address]         Additional Information About Your Visit        MyChart Information     [employer] gives you secure access to your electronic health record. If you see a primary care provider, you can also send messages to your care team and make appointments. If you have questions, please call your primary care clinic.  If you do not have a primary care provider, please call [phone] and they will assist you.      [employer] is an electronic gateway that provides easy, online access to your medical records. With  [employer], you can request a clinic appointment, read your test results, renew a prescription or communicate with your care team.     To access your existing account, please contact your [facility] or call [phone] for assistance.        Care EveryWhere ID     This is your Care EveryWhere ID. This could be used by other organizations to access your [facility] medical records  [id number]        Your Vitals Were     Pulse Temperature Height Pulse Oximetry BMI (Body Mass Index)       84 98.8  F (37.1  C) (Oral) 6' 95% 30.53 kg/m2        Blood Pressure from Last 3 Encounters:   01/19/18 166/85   01/16/18 (!) 176/92   01/07/18 157/71    Weight from Last 3 Encounters:   01/19/18 225 lb 1.6 oz   01/05/18 215 lb 9.8 oz   12/13/17 190 lb 0.6 oz              Today, you had the following     No orders found for display         Today's Medication Changes          These changes are accurate as of: 1/19/18 11:41 AM.  If you have any questions, ask your nurse or doctor.               These medicines have changed or have updated prescriptions.        Dose/Directions    amLODIPine 10 MG tablet   Commonly known as:  NORVASC   This may have changed:  when to take this   Used for:  HTN (hypertension)        Dose:  10 mg   Take 1 tablet (10 mg) by mouth daily   Quantity:  90 tablet   Refills:  3       loperamide 2 MG capsule   Commonly known as:  IMODIUM   This may have changed:    - how much to take  - when to take this  - reasons to take this   Used for:  S/P right hemicolectomy        Dose:  4 mg   Take 2 capsules (4 mg) by mouth 2 times daily   Quantity:  120 capsule   Refills:  3       magnesium oxide 400 (241.3 MG) MG tablet   Commonly known as:  MAG-OX   This may have changed:  when to take this   Used for:  Hypomagnesemia, CKD (chronic kidney disease) stage 3, GFR 30-59 ml/min        Dose:  400 mg   Take 1 tablet (400 mg) by mouth 2 times daily   Quantity:  180 tablet   Refills:  3                 Primary Care Provider Office Phone # Fax #    Shay Kirkpatrick [phone]       [address] [place] [address] [address]  [facility] [address]        Equal Access to Services     FRANKLIN PORTILLO : Hadii aad ku haddave [place], wakpda luqadaha, qaybta kaalmada magdaleno, devi cross laNemesiojarrod duckworth. So [facility] [phone].    ATENCIÓN: Si habla español, tiene a zheng disposición servicios gratuitos de asistencia lingüística. Llame al [phone].    We comply with applicable federal civil rights laws and [place] laws. We do not discriminate on the basis of race, color, national origin, age, disability, sex, sexual orientation, or gender identity.            Thank you!     Thank you for choosing [facility] COLON AND RECTAL SURGERY  for your care. Our goal is always to provide you with excellent care. Hearing back from our patients is one way we can continue to improve our services. Please take a few minutes to complete the written survey that you may receive in the mail after your visit with us. Thank you!             Your Updated Medication List - Protect others around you: Learn how to safely use, store and throw away your medicines at www.disposemymeds.org.          This list is accurate as of: 1/19/18 11:41 AM.  Always use your most recent med list.                   Brand Name Dispense Instructions for use Diagnosis    amLODIPine 10 MG tablet    NORVASC    90 tablet    Take 1 tablet (10 mg) by mouth daily    HTN (hypertension)       cholecalciferol 1000 UNIT tablet    vitamin D3    100 tablet    Take 1 tablet (1,000 Units) by mouth daily    Vitamin D deficiency       CIALIS 5 MG tablet   Generic drug:  tadalafil      Take 5 mg by mouth as needed        cyclobenzaprine 10 MG tablet    FLEXERIL    90 tablet    Take 1 tablet (10 mg) by mouth 3 times daily as needed for muscle spasms    Immunosuppression (H)       fludrocortisone 0.1 MG tablet    FLORINEF    90 tablet    Take 3 tablets (0.3 mg) by mouth daily     Hyperkalemia       furosemide 20 MG tablet    LASIX    60 tablet    Take 1 tablet (20 mg) by mouth 2 times daily    Hyperkalemia       GABAPENTIN PO      Take 300 mg by mouth 3 times daily        insulin glargine 100 UNIT/ML injection    LANTUS     Inject 50 Units Subcutaneous every morning        linagliptin 5 MG Tabs tablet    TRADJENTA     Take 5 mg by mouth every evening        loperamide 2 MG capsule    IMODIUM    120 capsule    Take 2 capsules (4 mg) by mouth 2 times daily    S/P right hemicolectomy       magnesium oxide 400 (241.3 MG) MG tablet    MAG-OX    180 tablet    Take 1 tablet (400 mg) by mouth 2 times daily    Hypomagnesemia, CKD (chronic kidney disease) stage 3, GFR 30-59 ml/min       mycophenolic acid 360 MG EC tablet    GENERIC EQUIVALENT    120 tablet    Take 2 tablets (720 mg) by mouth every 12 hours    History of liver transplant (H), Long-term use of immunosuppressant medication       OMEPRAZOLE PO      Take 20 mg by mouth every morning        * oxyCODONE IR 10 MG tablet    ROXICODONE    30 tablet    Take 1 tablet (10 mg) by mouth daily as needed for moderate to severe pain    Osteoarthritis       * oxyCODONE IR 5 MG tablet    ROXICODONE    30 tablet    Take 1 tablet (5 mg) by mouth every 4 hours as needed for pain maximum 6 tablet(s) per day    Ileostomy in place (H)       patiromer 8.4 G packet    VELTASSA    30 each    Take 8.4 g by mouth daily    Hyperkalemia       polyethylene glycol powder    MIRALAX    510 g    Take 17 g (1 capful) by mouth daily    Ileostomy in place (H)       predniSONE 5 MG tablet    DELTASONE    30 tablet    Take 1 tablet (5 mg) by mouth daily    Liver transplant recipient (H), Long-term use of immunosuppressant medication       sodium bicarbonate 650 MG tablet     180 tablet    Take 2 tablets (1,300 mg) by mouth 3 times daily    Hyperkalemia       tacrolimus 1 MG capsule    GENERIC EQUIVALENT    60 capsule    Take 1 capsule (1 mg) by mouth every 12 hours     Liver transplant recipient (H)       * Notice:  This list has 2 medication(s) that are the same as other medications prescribed for you. Read the directions carefully, and ask your doctor or other care provider to review them with you.

## 2018-01-19 NOTE — PROGRESS NOTES
Colon and Rectal Surgery Postoperative Clinic Note    RE: Camacho Bhagat  : 1965  BISI: 2018    Camacho Bhagat is a very pleasant 52 year old male status post liver transplant who underwent exploratory laparotomy, lysis of adhesions, right hemicolectomy, end ileostomy, mucous fistula, and partial omentectomy on 2017 for retroperitoneal pericolic air with sepsis after colonoscopy.  He is now status post ileostomy and mucous fistula takedowns with primary anastomosis on 2018 with Dr. Dinh.  His postoperative course was uneventful and he was discharged to home on 2018.  He presents today for follow-up.    Interval history: Camacho has been doing well since returning home.  He is not needing any pain medications.  He is having 2-4 fairly loose bowel movements a day.  He does have some gas pain but this passes quickly.  He denies any nausea or vomiting and is tolerating a low fiber diet.  He is packing his takedown site daily and feels this is healing well.  He denies any fevers or chills.                                                 Assessment/Plan:  52 year old male status post ileostomy and mucous fistula takedowns with primary anastomosis on 2018 with Dr. Dinh.  He is recovering quite well from surgery.  Takedown site is healing well.  He can continue to pack this with the corner of a 4 x 4 gauze.  Staples were removed today.  There is one small opening in his midline incision without any signs of infection.  We will have him pack this with quarter inch Nu Gauze daily.  Continue on a low residue diet for another 2-3 weeks and then he can slowly add fiber back into his diet.  No lifting more than 10 pounds for full 6 weeks from surgery.  We will have him follow-up with Dr. Dinh in the next 2-3 weeks as well.  Encouraged him to contact the clinic in the meantime with any questions or concerns.  Patient's questions were answered to his stated satisfaction and he is in  agreement with this plan.    Medical history:  Past Medical History:   Diagnosis Date     Depressive disorder, not elsewhere classified      Diabetes type 2, controlled (H) 11/10/2016     Esophageal reflux      Fibromyalgia 1/2009    dx with Dr Benitez( Rheum)     Gangrene of finger (H) 8/25/2017     H/O deep venous thrombosis 11/2001    Permanent IVC filter in place.     H/O CASTAÑEDA (nonalcoholic steatohepatitis)      H/O Pneumonia, organism unspecified(486) 10/2001    Included ARDS, sepsis, and  acute renal failure; hospitalized, required tracheostomy placement.     H/O: HTN (hypertension) 11/2001    No longer prescribed antihypertensive medication.       History of hepatocellular carcinoma      History of liver transplant (H)      History of obstructive sleep apnea     No longer wears CPAP since losing approximately 200 pounds with his liver transplant and its complications.       HLD (hyperlipidemia)      Ischemia of both lower extremities 8/25/2017    Distal ischemia due to shock/high pressor requirements     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     Osteoarthritis      Presence of PERMANENT IVC filter      Rheumatoid arthritis(714.0)        Surgical history:  Past Surgical History:   Procedure Laterality Date     BENCH LIVER N/A 3/4/2017    Procedure: BENCH LIVER;  Surgeon: Jovan Tran MD;  Location: [facility] TOTAL KNEE ARTHROPLASTY  2008    Right knee arthroscopy     CHOLECYSTECTOMY       COLONOSCOPY N/A 7/21/2017    Procedure: COMBINED COLONOSCOPY, SINGLE OR MULTIPLE BIOPSY/POLYPECTOMY BY BIOPSY;  Colonoscopy;  Surgeon: Izaiah Montes MD;  Location:  GI     ENT SURGERY      Repair of deviated septum     ESOPHAGOSCOPY, GASTROSCOPY, DUODENOSCOPY (EGD), COMBINED N/A 8/4/2016    Procedure: COMBINED ESOPHAGOSCOPY, GASTROSCOPY, DUODENOSCOPY (EGD), BIOPSY SINGLE OR MULTIPLE;  Surgeon: Trent Pederson MD;  Location:  GI     ESOPHAGOSCOPY, GASTROSCOPY, DUODENOSCOPY (EGD), COMBINED N/A 8/27/2017     Procedure: COMBINED ESOPHAGOSCOPY, GASTROSCOPY, DUODENOSCOPY (EGD);;  Surgeon: Los Wynn MD;  Location: [facility] GI     LAPAROTOMY EXPLORATORY N/A 2017    Procedure: LAPAROTOMY EXPLORATORY;  Exploratory Laparotomy, washout;  Surgeon: Tip Zhang MD;  Location: [facility] OR     LAPAROTOMY EXPLORATORY N/A 2017    Procedure: LAPAROTOMY EXPLORATORY;  Exploratory Laparotomy, Washout with closure.;  Surgeon: Tip Zhang MD;  Location: [facility] OR     LAPAROTOMY EXPLORATORY N/A 2017    Procedure: LAPAROTOMY EXPLORATORY;  Exploratory Laparotomy, Right angelique-colectomy, end ileostomy, mucosal fistula, partial omentectomy;  Surgeon: Sara Dinh MD;  Location: [facility] OR     ORTHOPEDIC SURGERY Right     Repair of dislocated wrist.       RELEASE TRIGGER FINGER Right 11/10/2017    Procedure: RELEASE TRIGGER FINGER;  Debride, V-Y Flap Right Index Finger;  Surgeon: Momo Noonan MD;  [facility]: [facility] OR     TAKEDOWN ILEOSTOMY N/A 2018    Procedure: TAKEDOWN ILEOSTOMY;  Exploratory Laparotomy, Lysis of Adhesions, Takedown Of End Ileostomy, Takedown of mucocutaneous fistula, ileocolic resection  And Colorectal Anastomosis, Primary repair of Ventral Hernia, Anesthesia Block ;  Surgeon: Sara Dinh MD;  Location: [facility] OR     TRACHEOSTOMY  2001    During hospitalization for pneumonia.       TRANSPLANT LIVER RECIPIENT  DONOR N/A 3/4/2017    Procedure: TRANSPLANT LIVER RECIPIENT  DONOR;  Surgeon: Jovan Tran MD;  Location: [facility] OR       Problem list:    Patient Active Problem List    Diagnosis Date Noted     Ileostomy in place (H) 2018     Priority: Medium     Peristomal skin complication 2017     Priority: Medium     Painful periwound skin 2017     Priority: Medium     Encounter for attention to ileostomy (H) 2017     Priority: Medium     History of creation of ostomy 10/30/2017     Priority: Medium     Elevated serum creatinine  10/16/2017     Priority: Medium     Pancytopenia (H) 08/25/2017     Priority: Medium     Gangrene of finger (H) 08/25/2017     Priority: Medium     Ischemia of both lower extremities 08/25/2017     Priority: Medium     Distal ischemia due to shock/high pressor requirements       S/P liver transplant (H) 07/26/2017     Priority: Medium     Neutropenic colitis (H) 07/04/2017     Priority: Medium     Immunosuppressed status (H) 03/10/2017     Priority: Medium     Liver transplant recipient (H) 03/04/2017     Priority: Medium     Hyperkalemia 02/14/2017     Priority: Medium     Hepatocellular carcinoma (H) 01/27/2016     Priority: Medium     Osteoarthritis 01/18/2015     Priority: Medium     Rheumatoid arthritis (H) 01/18/2015     Priority: Medium     Medication refill- do not delete  11/13/2013     Priority: Medium     Fibromyalgia 08/15/2011     Priority: Medium     Sicca syndrome (H) 08/15/2011     Priority: Medium     Testicular hypofunction      Priority: Medium     Problem list name updated by automated process. Provider to review       Carpal tunnel syndrome 07/08/2002     Priority: Medium       Medications:  Current Outpatient Prescriptions   Medication Sig Dispense Refill     oxyCODONE IR (ROXICODONE) 5 MG tablet Take 1 tablet (5 mg) by mouth every 4 hours as needed for pain maximum 6 tablet(s) per day 30 tablet 0     polyethylene glycol (MIRALAX) powder Take 17 g (1 capful) by mouth daily 510 g 1     sodium bicarbonate 650 MG tablet Take 2 tablets (1,300 mg) by mouth 3 times daily 180 tablet 3     cholecalciferol (VITAMIN D3) 1000 UNIT tablet Take 1 tablet (1,000 Units) by mouth daily 100 tablet 3     predniSONE (DELTASONE) 5 MG tablet Take 1 tablet (5 mg) by mouth daily 30 tablet 11     mycophenolic acid (GENERIC EQUIVALENT) 360 MG EC tablet Take 2 tablets (720 mg) by mouth every 12 hours 120 tablet 3     patiromer (VELTASSA) 8.4 G packet Take 8.4 g by mouth daily 30 each 3     magnesium oxide (MAG-OX) 400  (241.3 MG) MG tablet Take 1 tablet (400 mg) by mouth 2 times daily (Patient taking differently: Take 400 mg by mouth daily ) 180 tablet 3     tacrolimus (GENERIC EQUIVALENT) 1 MG capsule Take 1 capsule (1 mg) by mouth every 12 hours 60 capsule 11     fludrocortisone (FLORINEF) 0.1 MG tablet Take 3 tablets (0.3 mg) by mouth daily 90 tablet 3     amLODIPine (NORVASC) 10 MG tablet Take 1 tablet (10 mg) by mouth daily (Patient taking differently: Take 10 mg by mouth every morning ) 90 tablet 3     oxyCODONE IR (ROXICODONE) 10 MG tablet Take 1 tablet (10 mg) by mouth daily as needed for moderate to severe pain 30 tablet 0     CIALIS 5 MG tablet Take 5 mg by mouth as needed   1     furosemide (LASIX) 20 MG tablet Take 1 tablet (20 mg) by mouth 2 times daily 60 tablet 3     OMEPRAZOLE PO Take 20 mg by mouth every morning        GABAPENTIN PO Take 300 mg by mouth 3 times daily       insulin glargine (LANTUS) 100 UNIT/ML injection Inject 50 Units Subcutaneous every morning       linagliptin (TRADJENTA) 5 MG TABS tablet Take 5 mg by mouth every evening        loperamide (IMODIUM) 2 MG capsule Take 2 capsules (4 mg) by mouth 2 times daily (Patient taking differently: Take 2 mg by mouth 2 times daily as needed ) 120 capsule 3     cyclobenzaprine (FLEXERIL) 10 MG tablet Take 1 tablet (10 mg) by mouth 3 times daily as needed for muscle spasms 90 tablet 0       Allergies:  Allergies   Allergen Reactions     Erythromycin GI Disturbance     Vioxx      Nausea, vomiting       Family history:  Family History   Problem Relation Age of Onset     DIABETES Father      Hypertension Father      Substance Abuse Father      Arthritis Mother      Thyroid Cancer Mother      Survivor!     Cervical Cancer Maternal Grandmother      CEREBROVASCULAR DISEASE Maternal Grandfather      Prostate Cancer Paternal Grandfather      Colon Cancer No family hx of      Hyperlipidemia No family hx of      Coronary Artery Disease No family hx of      Breast Cancer  No family hx of        Social history:  Social History   Substance Use Topics     Smoking status: Former Smoker     Packs/day: 0.75     Years: 2.00     Quit date: 1988     Smokeless tobacco: Former User     Types: Chew     Quit date: 10/8/2015     Alcohol use No      Comment: No alcohol since liver transplant.       Marital status: .    Nursing Notes:   Yonathan Vasquez LPN  1/19/2018 11:01 AM  Signed  Chief Complaint   Patient presents with     Surgical Followup     Post op visit.        Vitals:    01/19/18 1058   BP: 166/85   BP Location: Left arm   Patient Position: Chair   Cuff Size: Adult Large   Pulse: 84   Temp: 98.8  F (37.1  C)   TempSrc: Oral   SpO2: 95%   Weight: 225 lb 1.6 oz   Height: 6'       Body mass index is 30.53 kg/(m^2).    Blayne HENRY LPN                     Physical Examination: Exam was chaperoned by Blayne Vasquez LPN  /85 (BP Location: Left arm, Patient Position: Chair, Cuff Size: Adult Large)  Pulse 84  Temp 98.8  F (37.1  C) (Oral)  Ht 6'  Wt 225 lb 1.6 oz  SpO2 95%  BMI 30.53 kg/m2  General: Alert, oriented, in no acute distress, sitting comfortably  HEENT: Mucous membranes moist  Abdomen: Soft, nondistended, nontender on palpation.  Midline incision with staples in place.  Staples removed and there is one small opening in the middle of the incision with good granulation tissue in the base of the wound and no purulent drainage.  A small amount of serous sanguinous drainage was expressed.  No surrounding erythema.  This was packed with quarter inch Nu Gauze.  Takedown site with good granulation tissue in the base of the wound.  Minimal slough and this was debrided out.  This was packed with quarter of a 4 x 4 gauze.  Extremities: Warm, dry, no edema    Total face to face time was 15 minutes, >50% counseling.  This is a postop visit.    LAMIN Queen, NP-C  Colon and Rectal Surgery  [facility]    This note was created using speech  recognition software and may contain unintended word substitutions.

## 2018-01-19 NOTE — NURSING NOTE
Chief Complaint   Patient presents with     Surgical Followup     Post op visit.        Vitals:    01/19/18 1058   BP: 166/85   BP Location: Left arm   Patient Position: Chair   Cuff Size: Adult Large   Pulse: 84   Temp: 98.8  F (37.1  C)   TempSrc: Oral   SpO2: 95%   Weight: 225 lb 1.6 oz   Height: 6'       Body mass index is 30.53 kg/(m^2).    Blayne HENRY LPN

## 2018-01-22 ASSESSMENT — ENCOUNTER SYMPTOMS
SWOLLEN GLANDS: 0
BRUISES/BLEEDS EASILY: 0

## 2018-01-25 ENCOUNTER — TELEPHONE (OUTPATIENT)
Dept: TRANSPLANT | Facility: CLINIC | Age: 53
End: 2018-01-25

## 2018-01-25 NOTE — TELEPHONE ENCOUNTER
Patient called- was to have an appointment with Dr Tran 2 weeks after d/c- has not been given an appointment yet over 2 weeks. Per Abril Doty have schedulers try to make an appointment next week

## 2018-01-26 DIAGNOSIS — N17.9 ACUTE KIDNEY INJURY (H): Primary | ICD-10-CM

## 2018-01-26 NOTE — TELEPHONE ENCOUNTER
January 26, 2018: I spoke with Camacho who took the first-available appointment with his surgeon, Dr. Tran:    Date: 2/5/2018 Status: [facility]   Time: 10:50 AM Length: 20   Visit Type: UMP LIVER RETURN POST OP [[id number]] NAKIA: [id number]   Provider: Jovan Tran MD       The pt is not keen on coming during the Super Bowl festivities; I told him that his doctor is in the OR this coming Monday (1/29), and this was the next-available appt.    Angelica Posadas  Transplant   [phone]

## 2018-01-29 ENCOUNTER — HOSPITAL ENCOUNTER (OUTPATIENT)
Dept: LAB | Facility: CLINIC | Age: 53
Discharge: HOME | End: 2018-01-29
Attending: INTERNAL MEDICINE | Admitting: INTERNAL MEDICINE
Payer: COMMERCIAL

## 2018-01-29 DIAGNOSIS — N17.9 ACUTE KIDNEY INJURY (H): ICD-10-CM

## 2018-01-29 DIAGNOSIS — Z94.4 LIVER REPLACED BY TRANSPLANT (H): ICD-10-CM

## 2018-01-29 LAB
ALBUMIN SERPL-MCNC: 2.9 G/DL (ref 3.4–5)
ALBUMIN UR-MCNC: >499 MG/DL
ALP SERPL-CCNC: 138 U/L (ref 40–150)
ALT SERPL W P-5'-P-CCNC: 15 U/L (ref 0–70)
ANION GAP SERPL CALCULATED.3IONS-SCNC: 5 MMOL/L (ref 3–14)
APPEARANCE UR: CLEAR
AST SERPL W P-5'-P-CCNC: 22 U/L (ref 0–45)
BASOPHILS # BLD AUTO: 0 10E9/L (ref 0–0.2)
BASOPHILS NFR BLD AUTO: 0.2 %
BILIRUB DIRECT SERPL-MCNC: 0.2 MG/DL (ref 0–0.2)
BILIRUB SERPL-MCNC: 0.6 MG/DL (ref 0.2–1.3)
BILIRUB UR QL STRIP: NEGATIVE
BUN SERPL-MCNC: 19 MG/DL (ref 7–30)
CALCIUM SERPL-MCNC: 7.7 MG/DL (ref 8.5–10.1)
CHLORIDE SERPL-SCNC: 107 MMOL/L (ref 94–109)
CO2 SERPL-SCNC: 29 MMOL/L (ref 20–32)
COLOR UR AUTO: YELLOW
CREAT SERPL-MCNC: 1.14 MG/DL (ref 0.66–1.25)
CREAT UR-MCNC: 76 MG/DL
DIFFERENTIAL METHOD BLD: ABNORMAL
EOSINOPHIL # BLD AUTO: 0 10E9/L (ref 0–0.7)
EOSINOPHIL NFR BLD AUTO: 0.8 %
ERYTHROCYTE [DISTWIDTH] IN BLOOD BY AUTOMATED COUNT: 15.2 % (ref 10–15)
GFR SERPL CREATININE-BSD FRML MDRD: 67 ML/MIN/1.7M2
GLUCOSE SERPL-MCNC: 250 MG/DL (ref 70–99)
GLUCOSE UR STRIP-MCNC: 150 MG/DL
HCT VFR BLD AUTO: 25.6 % (ref 40–53)
HGB BLD-MCNC: 8.3 G/DL (ref 13.3–17.7)
HGB UR QL STRIP: ABNORMAL
HYALINE CASTS #/AREA URNS LPF: 4 /LPF (ref 0–2)
IMM GRANULOCYTES # BLD: 0 10E9/L (ref 0–0.4)
IMM GRANULOCYTES NFR BLD: 0.4 %
KETONES UR STRIP-MCNC: NEGATIVE MG/DL
LEUKOCYTE ESTERASE UR QL STRIP: NEGATIVE
LYMPHOCYTES # BLD AUTO: 1.2 10E9/L (ref 0.8–5.3)
LYMPHOCYTES NFR BLD AUTO: 24.7 %
MAGNESIUM SERPL-MCNC: 2.1 MG/DL (ref 1.6–2.3)
MCH RBC QN AUTO: 30.2 PG (ref 26.5–33)
MCHC RBC AUTO-ENTMCNC: 32.4 G/DL (ref 31.5–36.5)
MCV RBC AUTO: 93 FL (ref 78–100)
MONOCYTES # BLD AUTO: 0.2 10E9/L (ref 0–1.3)
MONOCYTES NFR BLD AUTO: 5 %
MUCOUS THREADS #/AREA URNS LPF: PRESENT /LPF
NEUTROPHILS # BLD AUTO: 3.3 10E9/L (ref 1.6–8.3)
NEUTROPHILS NFR BLD AUTO: 68.9 %
NITRATE UR QL: NEGATIVE
NRBC # BLD AUTO: 0 10*3/UL
NRBC BLD AUTO-RTO: 0 /100
PH UR STRIP: 6 PH (ref 5–7)
PHOSPHATE SERPL-MCNC: 3 MG/DL (ref 2.5–4.5)
PLATELET # BLD AUTO: 88 10E9/L (ref 150–450)
POTASSIUM SERPL-SCNC: 4.2 MMOL/L (ref 3.4–5.3)
PROT SERPL-MCNC: 6.7 G/DL (ref 6.8–8.8)
PROT UR-MCNC: 6.5 G/L
PROT/CREAT 24H UR: 8.51 G/G CR (ref 0–0.2)
RBC # BLD AUTO: 2.75 10E12/L (ref 4.4–5.9)
RBC #/AREA URNS AUTO: 4 /HPF (ref 0–2)
SODIUM SERPL-SCNC: 141 MMOL/L (ref 133–144)
SOURCE: ABNORMAL
SP GR UR STRIP: 1.01 (ref 1–1.03)
SQUAMOUS #/AREA URNS AUTO: <1 /HPF (ref 0–1)
TACROLIMUS BLD-MCNC: <3 UG/L (ref 5–15)
TME LAST DOSE: ABNORMAL H
UROBILINOGEN UR STRIP-MCNC: 0 MG/DL (ref 0–2)
WBC # BLD AUTO: 4.8 10E9/L (ref 4–11)
WBC #/AREA URNS AUTO: 4 /HPF (ref 0–2)

## 2018-01-29 PROCEDURE — 82248 BILIRUBIN DIRECT: CPT

## 2018-01-29 PROCEDURE — 83735 ASSAY OF MAGNESIUM: CPT

## 2018-01-29 PROCEDURE — 84460 ALANINE AMINO (ALT) (SGPT): CPT

## 2018-01-29 PROCEDURE — 84450 TRANSFERASE (AST) (SGOT): CPT

## 2018-01-29 PROCEDURE — 84156 ASSAY OF PROTEIN URINE: CPT

## 2018-01-29 PROCEDURE — 80076 HEPATIC FUNCTION PANEL: CPT

## 2018-01-29 PROCEDURE — 81001 URINALYSIS AUTO W/SCOPE: CPT

## 2018-01-29 PROCEDURE — 85025 COMPLETE CBC W/AUTO DIFF WBC: CPT

## 2018-01-29 PROCEDURE — 84155 ASSAY OF PROTEIN SERUM: CPT

## 2018-01-29 PROCEDURE — 80197 ASSAY OF TACROLIMUS: CPT | Performed by: INTERNAL MEDICINE

## 2018-01-29 PROCEDURE — 80069 RENAL FUNCTION PANEL: CPT

## 2018-01-29 PROCEDURE — 36415 COLL VENOUS BLD VENIPUNCTURE: CPT

## 2018-01-29 PROCEDURE — 82247 BILIRUBIN TOTAL: CPT

## 2018-01-29 PROCEDURE — 84075 ASSAY ALKALINE PHOSPHATASE: CPT

## 2018-01-30 ENCOUNTER — TELEPHONE (OUTPATIENT)
Dept: TRANSPLANT | Facility: CLINIC | Age: 53
End: 2018-01-30

## 2018-01-30 DIAGNOSIS — Z94.4 HISTORY OF LIVER TRANSPLANT (H): Primary | ICD-10-CM

## 2018-01-30 DIAGNOSIS — R80.9 PROTEINURIA: ICD-10-CM

## 2018-01-30 NOTE — TELEPHONE ENCOUNTER
Per  review of random urine protein:   Pt to have repeat protein random urine, pt to see nephrology     Phone message left for Camacho, requesting he plan to do urine test before his appt on Wed 1/31 at clinic.  Requested call back to verify plan.

## 2018-01-31 ENCOUNTER — TELEPHONE (OUTPATIENT)
Dept: PHARMACY | Facility: CLINIC | Age: 53
End: 2018-01-31

## 2018-01-31 ENCOUNTER — OFFICE VISIT (OUTPATIENT)
Dept: NEPHROLOGY | Facility: CLINIC | Age: 53
End: 2018-01-31
Payer: COMMERCIAL

## 2018-01-31 VITALS
OXYGEN SATURATION: 98 % | DIASTOLIC BLOOD PRESSURE: 84 MMHG | WEIGHT: 223 LBS | SYSTOLIC BLOOD PRESSURE: 164 MMHG | HEIGHT: 72 IN | HEART RATE: 80 BPM | BODY MASS INDEX: 30.2 KG/M2

## 2018-01-31 DIAGNOSIS — D63.1 ANEMIA IN STAGE 3 CHRONIC KIDNEY DISEASE (H): ICD-10-CM

## 2018-01-31 DIAGNOSIS — N18.30 ANEMIA IN STAGE 3 CHRONIC KIDNEY DISEASE (H): ICD-10-CM

## 2018-01-31 DIAGNOSIS — R80.9 PROTEINURIA: ICD-10-CM

## 2018-01-31 DIAGNOSIS — E83.42 HYPOMAGNESEMIA: ICD-10-CM

## 2018-01-31 DIAGNOSIS — Z94.4 LIVER REPLACED BY TRANSPLANT (H): ICD-10-CM

## 2018-01-31 DIAGNOSIS — Z94.4 HISTORY OF LIVER TRANSPLANT (H): ICD-10-CM

## 2018-01-31 DIAGNOSIS — N18.30 CKD (CHRONIC KIDNEY DISEASE) STAGE 3, GFR 30-59 ML/MIN (H): ICD-10-CM

## 2018-01-31 DIAGNOSIS — N18.4 CHRONIC KIDNEY DISEASE, STAGE 4 (SEVERE) (H): ICD-10-CM

## 2018-01-31 DIAGNOSIS — E87.5 HYPERKALEMIA: ICD-10-CM

## 2018-01-31 LAB
CREAT UR-MCNC: 76 MG/DL
PROT UR-MCNC: 7.25 G/L
PROT/CREAT 24H UR: 9.57 G/G CR (ref 0–0.2)

## 2018-01-31 PROCEDURE — 84156 ASSAY OF PROTEIN URINE: CPT | Performed by: INTERNAL MEDICINE

## 2018-01-31 PROCEDURE — G0463 HOSPITAL OUTPT CLINIC VISIT: HCPCS | Mod: ZF

## 2018-01-31 RX ORDER — SODIUM BICARBONATE 650 MG/1
650 TABLET ORAL 3 TIMES DAILY
Qty: 180 TABLET | Refills: 3
Start: 2018-01-31 | End: 2018-02-28

## 2018-01-31 RX ORDER — FLUDROCORTISONE ACETATE 0.1 MG/1
0.1 TABLET ORAL DAILY
Qty: 90 TABLET | Refills: 3 | Status: SHIPPED | OUTPATIENT
Start: 2018-01-31 | End: 2018-02-06

## 2018-01-31 RX ORDER — FUROSEMIDE 20 MG
40 TABLET ORAL 2 TIMES DAILY
Qty: 200 TABLET | Refills: 3 | Status: SHIPPED | OUTPATIENT
Start: 2018-01-31 | End: 2018-04-04

## 2018-01-31 NOTE — PROGRESS NOTES
Nephrology Clinic Visit 1/31/18    Assessment and Plan:       1. CKD3b w/proteinuria - Creat 1.1 and likely dilutional given edema and elevated blood pressures. Baseline has been in the 1.3 range.   Etiology of CKD likely multifactorial; DM, recurrent EJ, CNI. Urine protein/creat 9.57 g/gCr today. Has been intolerant of ACE/ARB previously given problems with hyperkalemia. Blood pressure currently uncontrolled. Tac dose has been decreased to undetectable level.    - Avoid EJ if possible   - Avoid NSAIDs, nephrotoxins   - A1c goal is 7%. HgA1c 5.1% Jan 2018   - B/P goal is < 140/80. Currently uncontrolled   - Would like to add an ACE or ARB once we can demonstrate that hyperkalemia not longer a problem.       2. HTN - Uncontrolled. Clinic blood pressures 160-170/. No home blood pressures. He does edema, but no dyspnea. Currently on Lasix 20 mg bid and Amlodipine 10 mg qd.    - Will increase Lasix to 40 mg bid   - Begin daily weights   - Begin daily blood pressure readings   - Would like to add an ACE/ARB once his K is stabilized of florinef and Valtassa      3. Hyperkalemia - Potassium levels have declined since December once Tac dose/levels decreased. He remains on Valtassa and Florinef 0.3 mg qd. K 4.2 today despite the fact that patient has reintroduced K foods    - Decrease Florinef to 0.1 mg qd.    - I am increasing Lasix to 40 mg bid   - Continue current dose of Valtassa   - Transplant has tapered downTacro. Current level is undetectable   - Will recheck chems next week. If K remains at goal would stop Florinef. IF remains stable the following set of labs then would stop Valtassa.       4. Volume status - Hypervolemia w/edema. No dyspnea. Only having 2-4 stools/day. Voiding w/o difficulty. Weight 223 # today in clinic. Was 190.4# last visit. Blood pressures elevated    - Increase Lasix to 40 mg bid.    - Check labs next week      5. Acid base - Bicarb 29-30 in setting of decreased stools following takedown.  Currently on Bicarb 1300 mg TID   - Decrease Bicarb to 650 mg TID.    - Recheck labs next week      6. BMD - Corrected Ca 8.5, Phos 3.0, albumin 2.9   - Vitamin D 20 and PTH 34 (9/14/17)   - Continue Vit D 1000 U qd      7. Hypomagnesemia - Mag 2.1   - Continue Mag oxide 400 mg qd   - May be able to decrease next week      8. Anemia - Improving. Hgb 8.3. Had risen to 9.4 prior to surgery on 1/5/18. Etiology felt to be 2/2 CKD and decreased absorption 2/2 bowel resection   - Iron studies 1/18: Ferritin 973, Fe 37, Tsat 23%.    - Given Aranesp 25 mcg x 1 on 10/27/17   - Not clear patient really needs DIPAK at this time. His Hgb was rising prior to surgery. Will hold off on referral at this time.       9. Liver transplant IS: Tacrolimus, MMF, Pred   - Tacro dose decreased to undetectable level. IS regimen to be readdressed by Dr Camejo in March.       10. Dispo- RCT 1 month. Labs next week      Assessment and plan was discussed with patient and he voiced his understanding and agreement.      Reason for Visit:  CKD/HTN follow up        HPI:  Mr Bhagat is a 53 yo male in today for CKD f/u. He has struggled with hyperkalemia attributed to Tacrolimus ( currently on very low dose with undetected level) and   recurrent EJ 2/2 large volume ostomy output. He was started on Valtassa on 11/1/17.     Interval Hx:   Patient underwent takedown of end ileostomy and mucous fistula on 1/5/18. Bicarb has risen to 29-30 range and K has been stable in the 3-4 range despite eating potassium containing foods. He is having 2-4 stools/day.   Not checking blood pressures or weights and he has increased edema.       Baseline Cr: 1.3 range      ROS:  No complaints. Had takedown surgery 1/5/18. Having 2-4 stools/day. Beginning to eat some potassium containing foods and K has been fine. Has not been weighing himself or checking blood pressures at home. No post op pain. Walking the dog daily. Denies CP, dyspnea, abdominal pain, No voiding issues.         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 Former smoker.     Allergies:  Allergies   Allergen Reactions     Erythromycin GI Disturbance     Vioxx      Nausea, vomiting       Medications:  Prior to Admission medications    Medication Sig Start Date End Date Taking? Authorizing Provider   fludrocortisone (FLORINEF) 0.1 MG tablet Take 1 tablet (0.1 mg) by mouth daily 1/31/18  Yes Cinda Cazares NP   furosemide (LASIX) 20 MG tablet Take 2 tablets (40 mg) by mouth 2 times daily 1/31/18  Yes Cinda Cazares NP   magnesium oxide (MAG-OX) 400 (241.3 MG) MG tablet Take 1 tablet (400 mg) by mouth daily 1/31/18  Yes Cinda Cazares NP   sodium bicarbonate 650 MG tablet Take 1 tablet (650 mg) by mouth 3 times daily 1/31/18  Yes Cinda Cazares NP   polyethylene glycol (MIRALAX) powder Take 17 g (1 capful) by mouth daily 1/7/18  Yes Sara Dinh MD   cholecalciferol (VITAMIN D3) 1000 UNIT tablet Take 1 tablet (1,000 Units) by mouth daily 12/6/17  Yes Cinda Cazares NP   predniSONE (DELTASONE) 5 MG tablet Take 1 tablet (5 mg) by mouth daily 11/21/17  Yes Toñito Camejo MD   mycophenolic acid (GENERIC EQUIVALENT) 360 MG EC tablet Take 2 tablets (720 mg) by mouth every 12 hours 11/20/17  Yes Jovan Tran MD   patiromer (VELTASSA) 8.4 G packet Take 8.4 g by mouth daily 11/17/17  Yes Ninfa Hernández MD   tacrolimus (GENERIC EQUIVALENT) 1 MG capsule Take 1 capsule (1 mg) by mouth every 12 hours 11/9/17  Yes Toñito Camejo MD   amLODIPine (NORVASC) 10 MG tablet Take 1 tablet (10 mg) by mouth daily  Patient taking differently: Take 10 mg by mouth  every morning  11/7/17  Yes Ninfa Hernández MD   oxyCODONE IR (ROXICODONE) 10 MG tablet Take 1 tablet (10 mg) by mouth daily as needed for moderate to severe pain 11/6/17  Yes Shay Kirkpatrick MD   CIALIS 5 MG tablet Take 5 mg by mouth as needed  6/7/17  Yes Reported, Patient   OMEPRAZOLE PO Take 20 mg by mouth every morning    Yes Reported, Patient   GABAPENTIN PO Take 300 mg by mouth 3 times daily   Yes Reported, Patient   insulin glargine (LANTUS) 100 UNIT/ML injection Inject 50 Units Subcutaneous every morning   Yes Reported, Patient   linagliptin (TRADJENTA) 5 MG TABS tablet Take 5 mg by mouth every evening    Yes Reported, Patient   cyclobenzaprine (FLEXERIL) 10 MG tablet Take 1 tablet (10 mg) by mouth 3 times daily as needed for muscle spasms 9/8/17  Yes Felicia Tolliver, APRN CNP       Vitals:  /84  Pulse 80  Ht 1.829 m (6')  Wt 101.2 kg (223 lb)  SpO2 98%  BMI 30.24 kg/m2   Repeat blood pressure 152/78    Exam:  Gen: Calm, pleasant and interactive  CARDIAC: RRR  LUNGS: CTA  ABDOMEN: Abdominal dressing d/i. NT,. Non distended. EXT: 2+ edema    Results:    Results for RONNIE ARIZMENDI (MRN [id number]) as of 1/31/2018 13:30   Ref. Range 1/29/2018 12:35 1/29/2018 12:46 1/31/2018 12:13   Sodium Latest Ref Range: 133 - 144 mmol/L  141    Potassium Latest Ref Range: 3.4 - 5.3 mmol/L  4.2    Chloride Latest Ref Range: 94 - 109 mmol/L  107    Carbon Dioxide Latest Ref Range: 20 - 32 mmol/L  29    Urea Nitrogen Latest Ref Range: 7 - 30 mg/dL  19    Creatinine Latest Ref Range: 0.66 - 1.25 mg/dL  1.14    GFR Estimate Latest Ref Range: >60 mL/min/1.7m2  67    GFR Estimate If Black Latest Ref Range: >60 mL/min/1.7m2  81    Calcium Latest Ref Range: 8.5 - 10.1 mg/dL  7.7 (L)    Anion Gap Latest Ref Range: 3 - 14 mmol/L  5    Magnesium Latest Ref Range: 1.6 - 2.3 mg/dL  2.1    Phosphorus Latest Ref Range: 2.5 - 4.5 mg/dL  3.0    Albumin Latest Ref Range: 3.4 - 5.0 g/dL  2.9 (L)    Protein Total Latest Ref  Range: 6.8 - 8.8 g/dL  6.7 (L)    Bilirubin Total Latest Ref Range: 0.2 - 1.3 mg/dL  0.6    Alkaline Phosphatase Latest Ref Range: 40 - 150 U/L  138    ALT Latest Ref Range: 0 - 70 U/L  15    AST Latest Ref Range: 0 - 45 U/L  22    Bilirubin Direct Latest Ref Range: 0.0 - 0.2 mg/dL  0.2    Creatinine Urine Latest Units: mg/dL 76  76   Protein Random Urine Latest Units: g/L 6.50  7.25   Protein Total Urine g/gr Creatinine Latest Ref Range: 0 - 0.2 g/g Cr 8.51 (H)  9.57 (H)   Glucose Latest Ref Range: 70 - 99 mg/dL  250 (H)    WBC Latest Ref Range: 4.0 - 11.0 10e9/L  4.8    Hemoglobin Latest Ref Range: 13.3 - 17.7 g/dL  8.3 (L)    Hematocrit Latest Ref Range: 40.0 - 53.0 %  25.6 (L)    Platelet Count Latest Ref Range: 150 - 450 10e9/L  88 (L)    RBC Count Latest Ref Range: 4.4 - 5.9 10e12/L  2.75 (L)    MCV Latest Ref Range: 78 - 100 fl  93    MCH Latest Ref Range: 26.5 - 33.0 pg  30.2    MCHC Latest Ref Range: 31.5 - 36.5 g/dL  32.4    RDW Latest Ref Range: 10.0 - 15.0 %  15.2 (H)    Diff Method Unknown  Automated Method    % Neutrophils Latest Units: %  68.9    % Lymphocytes Latest Units: %  24.7    % Monocytes Latest Units: %  5.0    % Eosinophils Latest Units: %  0.8    % Basophils Latest Units: %  0.2    % Immature Granulocytes Latest Units: %  0.4    Nucleated RBCs Latest Ref Range: 0 /100  0    Absolute Neutrophil Latest Ref Range: 1.6 - 8.3 10e9/L  3.3    Absolute Lymphocytes Latest Ref Range: 0.8 - 5.3 10e9/L  1.2    Absolute Monocytes Latest Ref Range: 0.0 - 1.3 10e9/L  0.2    Absolute Eosinophils Latest Ref Range: 0.0 - 0.7 10e9/L  0.0    Absolute Basophils Latest Ref Range: 0.0 - 0.2 10e9/L  0.0    Abs Immature Granulocytes Latest Ref Range: 0 - 0.4 10e9/L  0.0    Absolute Nucleated RBC Unknown  0.0    Color Urine Unknown Yellow     Appearance Urine Unknown Clear     Glucose Urine Latest Ref Range: NEG^Negative mg/dL 150 (A)     Bilirubin Urine Latest Ref Range: NEG^Negative  Negative     Ketones Urine  Latest Ref Range: NEG^Negative mg/dL Negative     Specific Gravity Urine Latest Ref Range: 1.003 - 1.035  1.013     pH Urine Latest Ref Range: 5.0 - 7.0 pH 6.0     Protein Albumin Urine Latest Ref Range: NEG^Negative mg/dL >499 (A)     Urobilinogen mg/dL Latest Ref Range: 0.0 - 2.0 mg/dL 0.0     Nitrite Urine Latest Ref Range: NEG^Negative  Negative     Blood Urine Latest Ref Range: NEG^Negative  Small (A)     Leukocyte Esterase Urine Latest Ref Range: NEG^Negative  Negative     Source Unknown Midstream Urine     WBC Urine Latest Ref Range: 0 - 2 /HPF 4 (H)     RBC Urine Latest Ref Range: 0 - 2 /HPF 4 (H)     Squamous Epithelial /HPF Urine Latest Ref Range: 0 - 1 /HPF <1     Mucous Urine Latest Ref Range: NEG^Negative /LPF Present (A)     Hyaline Casts Latest Ref Range: 0 - 2 /LPF 4 (H)     Tacrolimus Last Dose Unknown  23:30 01/28/2018    Tacrolimus Level Latest Ref Range: 5.0 - 15.0 ug/L  <3.0 (L)

## 2018-01-31 NOTE — LETTER
1/31/2018      RE: Camacho Bhagat  [address]  [facility] [address]       Nephrology Clinic Visit 1/31/18    Assessment and Plan:       1. CKD3b w/proteinuria - Creat 1.1 and likely dilutional given edema and elevated blood pressures. Baseline has been in the 1.3 range.   Etiology of CKD likely multifactorial; DM, recurrent EJ, CNI. Urine protein/creat 9.57 g/gCr today. Has been intolerant of ACE/ARB previously given problems with hyperkalemia. Blood pressure currently uncontrolled. Tac dose has been decreased to undetectable level.    - Avoid EJ if possible   - Avoid NSAIDs, nephrotoxins   - A1c goal is 7%. HgA1c 5.1% Jan 2018   - B/P goal is < 140/80. Currently uncontrolled   - Would like to add an ACE or ARB once we can demonstrate that hyperkalemia not longer a problem.       2. HTN - Uncontrolled. Clinic blood pressures 160-170/. No home blood pressures. He does edema, but no dyspnea. Currently on Lasix 20 mg bid and Amlodipine 10 mg qd.    - Will increase Lasix to 40 mg bid   - Begin daily weights   - Begin daily blood pressure readings   - Would like to add an ACE/ARB once his K is stabilized of florinef and Valtassa      3. Hyperkalemia - Potassium levels have declined since December once Tac dose/levels decreased. He remains on Valtassa and Florinef 0.3 mg qd. K 4.2 today despite the fact that patient has reintroduced K foods    - Decrease Florinef to 0.1 mg qd.    - I am increasing Lasix to 40 mg bid   - Continue current dose of Valtassa   - Transplant has tapered downTacro. Current level is undetectable   - Will recheck chems next week. If K remains at goal would stop Florinef. IF remains stable the following set of labs then would stop Valtassa.       4. Volume status - Hypervolemia w/edema. No dyspnea. Only having 2-4 stools/day. Voiding w/o difficulty. Weight 223 # today in clinic. Was 190.4# last visit. Blood pressures elevated    - Increase Lasix to 40 mg bid.    - Check labs next  week      5. Acid base - Bicarb 29-30 in setting of decreased stools following takedown. Currently on Bicarb 1300 mg TID   - Decrease Bicarb to 650 mg TID.    - Recheck labs next week      6. BMD - Corrected Ca 8.5, Phos 3.0, albumin 2.9   - Vitamin D 20 and PTH 34 (9/14/17)   - Continue Vit D 1000 U qd      7. Hypomagnesemia - Mag 2.1   - Continue Mag oxide 400 mg qd   - May be able to decrease next week      8. Anemia - Improving. Hgb 8.3. Had risen to 9.4 prior to surgery on 1/5/18. Etiology felt to be 2/2 CKD and decreased absorption 2/2 bowel resection   - Iron studies 1/18: Ferritin 973, Fe 37, Tsat 23%.    - Given Aranesp 25 mcg x 1 on 10/27/17   - Not clear patient really needs DIPAK at this time. His Hgb was rising prior to surgery. Will hold off on referral at this time.       9. Liver transplant IS: Tacrolimus, MMF, Pred   - Tacro dose decreased to undetectable level. IS regimen to be readdressed by Dr Camejo in March.       10. Dispo- RCT 1 month . Labs next week      Assessment and plan was discussed with patient and he voiced his understanding and agreement.      Reason for Visit:  CKD/HTN follow up        HPI:  Mr Bhagat is a 51 yo male in today for CKD f/u. He has struggled with hyperkalemia attributed to Tacrolimus ( currently on very low dose with undetected level) and   recurrent EJ 2/2 large volume ostomy output. He was started on Valtassa on 11/1/17.     Interval Hx:   Patient underwent takedown of end ileostomy and mucous fistula on 1/5/18. Bicarb has risen to 29-30 range and K has been stable in the 3-4 range despite eating potassium containing foods. He is having 2-4 stools/day.   Not checking blood pressures or weights and he has increased edema.       Baseline Cr: 1.3 range      ROS:  No complaints. Had takedown surgery 1/5/18. Having 2-4 stools/day. Beginning to eat some potassium containing foods and K has been fine. Has not been weighing himself or checking blood pressures at home. No post  op pain. Walking the dog daily. Denies CP, dyspnea, abdominal pain, No voiding issues.        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 Former smoker.     Allergies:  Allergies   Allergen Reactions     Erythromycin GI Disturbance     Vioxx      Nausea, vomiting       Medications:  Prior to Admission medications    Medication Sig Start Date End Date Taking? Authorizing Provider   fludrocortisone (FLORINEF) 0.1 MG tablet Take 1 tablet (0.1 mg) by mouth daily 1/31/18  Yes Cinda Cazares NP   furosemide (LASIX) 20 MG tablet Take 2 tablets (40 mg) by mouth 2 times daily 1/31/18  Yes Cinda Cazares NP   magnesium oxide (MAG-OX) 400 (241.3 MG) MG tablet Take 1 tablet (400 mg) by mouth daily 1/31/18  Yes Cinda Cazares NP   sodium bicarbonate 650 MG tablet Take 1 tablet (650 mg) by mouth 3 times daily 1/31/18  Yes Cinda Cazares NP   polyethylene glycol (MIRALAX) powder Take 17 g (1 capful) by mouth daily 1/7/18  Yes Sara Dinh MD   cholecalciferol (VITAMIN D3) 1000 UNIT tablet Take 1 tablet (1,000 Units) by mouth daily 12/6/17  Yes Cinda Caazres NP   predniSONE (DELTASONE) 5 MG tablet Take 1 tablet (5 mg) by mouth daily 11/21/17  Yes Toñito Camejo MD   mycophenolic acid (GENERIC EQUIVALENT) 360 MG EC tablet Take 2 tablets (720 mg) by mouth every 12 hours 11/20/17  Yes Jovan Tran MD   patiromer (VELTASSA) 8.4 G packet Take 8.4 g by mouth daily 11/17/17  Yes Ninfa Hernández MD   tacrolimus (GENERIC EQUIVALENT) 1 MG capsule Take 1 capsule (1 mg) by mouth every 12 hours 11/9/17  Yes Toñito Camejo MD   amLODIPine (NORVASC) 10 MG  tablet Take 1 tablet (10 mg) by mouth daily  Patient taking differently: Take 10 mg by mouth every morning  11/7/17  Yes Ninfa Hernández MD   oxyCODONE IR (ROXICODONE) 10 MG tablet Take 1 tablet (10 mg) by mouth daily as needed for moderate to severe pain 11/6/17  Yes Shay Kirkpatrick MD   CIALIS 5 MG tablet Take 5 mg by mouth as needed  6/7/17  Yes Reported, Patient   OMEPRAZOLE PO Take 20 mg by mouth every morning    Yes Reported, Patient   GABAPENTIN PO Take 300 mg by mouth 3 times daily   Yes Reported, Patient   insulin glargine (LANTUS) 100 UNIT/ML injection Inject 50 Units Subcutaneous every morning   Yes Reported, Patient   linagliptin (TRADJENTA) 5 MG TABS tablet Take 5 mg by mouth every evening    Yes Reported, Patient   cyclobenzaprine (FLEXERIL) 10 MG tablet Take 1 tablet (10 mg) by mouth 3 times daily as needed for muscle spasms 9/8/17  Yes Felicia Tolliver, APRN CNP       Vitals:  /84  Pulse 80  Ht 1.829 m (6')  Wt 101.2 kg (223 lb)  SpO2 98%  BMI 30.24 kg/m2   Repeat blood pressure 152/78    Exam:  Gen: Calm, pleasant and interactive  CARDIAC: RRR  LUNGS: CTA  ABDOMEN: Abdominal dressing d/i. NT,. Non distended. EXT: 2+ edema    Results:    Results for RONNIE ARIZMENDI (MRN [id number]) as of 1/31/2018 13:30   Ref. Range 1/29/2018 12:35 1/29/2018 12:46 1/31/2018 12:13   Sodium Latest Ref Range: 133 - 144 mmol/L  141    Potassium Latest Ref Range: 3.4 - 5.3 mmol/L  4.2    Chloride Latest Ref Range: 94 - 109 mmol/L  107    Carbon Dioxide Latest Ref Range: 20 - 32 mmol/L  29    Urea Nitrogen Latest Ref Range: 7 - 30 mg/dL  19    Creatinine Latest Ref Range: 0.66 - 1.25 mg/dL  1.14    GFR Estimate Latest Ref Range: >60 mL/min/1.7m2  67    GFR Estimate If Black Latest Ref Range: >60 mL/min/1.7m2  81    Calcium Latest Ref Range: 8.5 - 10.1 mg/dL  7.7 (L)    Anion Gap Latest Ref Range: 3 - 14 mmol/L  5    Magnesium Latest Ref Range: 1.6 - 2.3 mg/dL  2.1    Phosphorus Latest Ref Range: 2.5 - 4.5  mg/dL  3.0    Albumin Latest Ref Range: 3.4 - 5.0 g/dL  2.9 (L)    Protein Total Latest Ref Range: 6.8 - 8.8 g/dL  6.7 (L)    Bilirubin Total Latest Ref Range: 0.2 - 1.3 mg/dL  0.6    Alkaline Phosphatase Latest Ref Range: 40 - 150 U/L  138    ALT Latest Ref Range: 0 - 70 U/L  15    AST Latest Ref Range: 0 - 45 U/L  22    Bilirubin Direct Latest Ref Range: 0.0 - 0.2 mg/dL  0.2    Creatinine Urine Latest Units: mg/dL 76  76   Protein Random Urine Latest Units: g/L 6.50  7.25   Protein Total Urine g/gr Creatinine Latest Ref Range: 0 - 0.2 g/g Cr 8.51 (H)  9.57 (H)   Glucose Latest Ref Range: 70 - 99 mg/dL  250 (H)    WBC Latest Ref Range: 4.0 - 11.0 10e9/L  4.8    Hemoglobin Latest Ref Range: 13.3 - 17.7 g/dL  8.3 (L)    Hematocrit Latest Ref Range: 40.0 - 53.0 %  25.6 (L)    Platelet Count Latest Ref Range: 150 - 450 10e9/L  88 (L)    RBC Count Latest Ref Range: 4.4 - 5.9 10e12/L  2.75 (L)    MCV Latest Ref Range: 78 - 100 fl  93    MCH Latest Ref Range: 26.5 - 33.0 pg  30.2    MCHC Latest Ref Range: 31.5 - 36.5 g/dL  32.4    RDW Latest Ref Range: 10.0 - 15.0 %  15.2 (H)    Diff Method Unknown  Automated Method    % Neutrophils Latest Units: %  68.9    % Lymphocytes Latest Units: %  24.7    % Monocytes Latest Units: %  5.0    % Eosinophils Latest Units: %  0.8    % Basophils Latest Units: %  0.2    % Immature Granulocytes Latest Units: %  0.4    Nucleated RBCs Latest Ref Range: 0 /100  0    Absolute Neutrophil Latest Ref Range: 1.6 - 8.3 10e9/L  3.3    Absolute Lymphocytes Latest Ref Range: 0.8 - 5.3 10e9/L  1.2    Absolute Monocytes Latest Ref Range: 0.0 - 1.3 10e9/L  0.2    Absolute Eosinophils Latest Ref Range: 0.0 - 0.7 10e9/L  0.0    Absolute Basophils Latest Ref Range: 0.0 - 0.2 10e9/L  0.0    Abs Immature Granulocytes Latest Ref Range: 0 - 0.4 10e9/L  0.0    Absolute Nucleated RBC Unknown  0.0    Color Urine Unknown Yellow     Appearance Urine Unknown Clear     Glucose Urine Latest Ref Range: NEG^Negative  mg/dL 150 (A)     Bilirubin Urine Latest Ref Range: NEG^Negative  Negative     Ketones Urine Latest Ref Range: NEG^Negative mg/dL Negative     Specific Gravity Urine Latest Ref Range: 1.003 - 1.035  1.013     pH Urine Latest Ref Range: 5.0 - 7.0 pH 6.0     Protein Albumin Urine Latest Ref Range: NEG^Negative mg/dL >499 (A)     Urobilinogen mg/dL Latest Ref Range: 0.0 - 2.0 mg/dL 0.0     Nitrite Urine Latest Ref Range: NEG^Negative  Negative     Blood Urine Latest Ref Range: NEG^Negative  Small (A)     Leukocyte Esterase Urine Latest Ref Range: NEG^Negative  Negative     Source Unknown Midstream Urine     WBC Urine Latest Ref Range: 0 - 2 /HPF 4 (H)     RBC Urine Latest Ref Range: 0 - 2 /HPF 4 (H)     Squamous Epithelial /HPF Urine Latest Ref Range: 0 - 1 /HPF <1     Mucous Urine Latest Ref Range: NEG^Negative /LPF Present (A)     Hyaline Casts Latest Ref Range: 0 - 2 /LPF 4 (H)     Tacrolimus Last Dose Unknown  23:30 01/28/2018    Tacrolimus Level Latest Ref Range: 5.0 - 15.0 ug/L  <3.0 (L)                Cinda Cazares, NP

## 2018-01-31 NOTE — TELEPHONE ENCOUNTER
Anemia Management Note  SUBJECTIVE/OBJECTIVE:  Referred by Jovanna Foster CNP on 11/6/2017  Primary Diagnosis: Anemia in Chronic Kidney Disease (N18.3, D63.1)     Secondary Diagnosis:  Chronic Kidney Disease, Stage 3 (N18.3)   Hgb goal range:  9-10  Epo/Darbo:  None  Iron regimen: None  Labs exp: 11/7/18   Contact:  Ok to leave message on cell phone per consent to communicate dated 3/28/17    OK to speak with None on file per consent to communicate dated 3/28/17    Anemia Latest Ref Rng & Units 12/11/2017 12/20/2017 1/2/2018 1/5/2018 1/6/2018 1/16/2018 1/29/2018   DIPAK Dose - - - - - - - -   Hemoglobin 13.3 - 17.7 g/dL 8.6(L) 9.3(L) 9.4(L) 8.9(L) 9.1(L) 7.8(L) 8.3(L)   TSAT 15 - 46 % 28 - - - - 23 -   Ferritin 26 - 388 ng/mL 1129(H) - - - - 973(H) -   PRBCs - - - - - - - -     BP Readings from Last 3 Encounters:   01/31/18 164/84   01/19/18 166/85   01/16/18 (!) 176/92     Wt Readings from Last 2 Encounters:   01/31/18 223 lb (101.2 kg)   01/19/18 225 lb 1.6 oz (102.1 kg)     He was in the hospital from 1/5 - 1/7/18  Patient has an appt w/Jovanna Foster on 1/31/18    LM for Camacho to call back to touch base on his intentions with aranesp injections. Appears he is choosing to hold off, ok per Dr Hernández. He has appt with Jovanna Foster 1/31 and can f/u on labs at that time.      ASSESSMENT:  Hgb: at goal - continue to monitor  TSat: not at goal (>30%) but ferritin >500ng/mL.  IV iron not indicated at this time per anemia protocol. Ferritin: At goal (>100ng/mL)    PLAN:  RTC for Hgb, ferritin and iron labs  in 4 week(s)  LM for Camacho to call back to touch base on his intentions with aranesp injections.    Camacho returned my call, he is not interested in initiating DIPAK therapy since his Hgb is increasing since his hospitalization.  He is not interested in being monitored by the Anemia Clinic at this time.  He said Dr Camejo will be monitoring his anemia and he has an appt w/Dr Camejo on 3/2/18.    Per notes from 01/31/208 visit, Dev  Debora felt Aranesp not necessary at this time. Labs being drawn week of 02/06/2018, Camacho will RTC in 1 month with Jovanna. 02/01/2018 [facility]    Orders needed to be renewed (for next follow-up date) in EPIC: None    Iron labs due:  2/13/18    Plan discussed with:  ESTELLA for patient  Plan provided by:  Linda  Reviewed 02/01/2018 [facility]  NEXT FOLLOW-UP DATE:  3/2/18    Anemia Management Service  Cecilia Villasenor,PharmD and Estefani Rosales CPhT  Phone: [phone]  Fax: [phone]

## 2018-01-31 NOTE — MR AVS SNAPSHOT
After Visit Summary   1/31/2018    Camacho Bhagat    MRN: [id number]           Patient Information     Date Of Birth          1965        Visit Information        Provider Department      1/31/2018 1:00 PM Cinda Cazares NP [facility]        Today's Diagnoses     Hyperkalemia        Hypomagnesemia        CKD (chronic kidney disease) stage 3, GFR 30-59 ml/min          Care Instructions    1. Check blood pressures daily and record, bring in next visit  2. Increase Lasix to 40 mg twice per day  3. Decrease Bicarb to 650 mg three times/day  4. Decrease florinef to 0.1 mg daily  5. Check daily weights  6. Labs in one week  7. RTC one month for f/u          Follow-ups after your visit        Follow-up notes from your care team     Return in about 4 weeks (around 2/28/2018).      Your next 10 appointments already scheduled     Feb 05, 2018 10:50 AM CST   (Arrive by 10:35 AM)   Liver Return Post Op with Jovan Tran MD   [facility] ([facility])    [address] [place] [address]  [address]  [facility] [address]   [phone]            Feb 28, 2018  1:00 PM CST   (Arrive by 12:30 PM)   Return Visit with Cinda Cazares NP   [facility] ([facility])    [address] [place] [address]  [address]  [facility] [address]   [phone]            Mar 02, 2018 10:45 AM CST   (Arrive by 10:30 AM)   Return Liver Transplant with Toñito Camejo MD   [facility] ([facility])    [address] [place] [address]  [address]  [facility] [address]   [phone]              Future tests that were ordered for you today     Open Standing Orders        Priority Remaining Interval Expires Ordered    Renal panel Routine 99/99  1/31/2019 1/31/2018          Open Future Orders        Priority Expected Expires Ordered    Magnesium Routine  1/31/2019 1/31/2018    Hemoglobin and hematocrit Routine  1/31/2019  1/31/2018    CBC with platelets Routine  1/31/2019 1/31/2018    Magnesium Routine  1/31/2019 1/31/2018    Hepatic panel Routine  1/31/2019 1/31/2018    Tacrolimus level Routine  1/31/2019 1/31/2018            Who to contact     If you have questions or need follow up information about today's clinic visit or your schedule please contact [facility] NE[facility] directly at [phone].  Normal or non-critical lab and imaging results will be communicated to you by HyTrusthart, letter or phone within 4 business days after the clinic has received the results. If you do not hear from us within 7 days, please contact the clinic through Workspot or phone. If you have a critical or abnormal lab result, we will notify you by phone as soon as possible.  Submit refill requests through Workspot or call your pharmacy and they will forward the refill request to us. Please allow 3 business days for your refill to be completed.          Additional Information About Your Visit        Workspot Information     Workspot gives you secure access to your electronic health record. If you see a primary care provider, you can also send messages to your care team and make appointments. If you have questions, please call your primary care clinic.  If you do not have a primary care provider, please call [phone] and they will assist you.        Care EveryWhere ID     This is your Care EveryWhere ID. This could be used by other organizations to access your [facility] medical records  [id number]        Your Vitals Were     Pulse Height Pulse Oximetry BMI (Body Mass Index)          80 1.829 m (6') 98% 30.24 kg/m2         Blood Pressure from Last 3 Encounters:   01/31/18 164/84   01/19/18 166/85   01/16/18 (!) 176/92    Weight from Last 3 Encounters:   01/31/18 101.2 kg (223 lb)   01/19/18 102.1 kg (225 lb 1.6 oz)   01/05/18 97.8 kg (215 lb 9.8 oz)                 Today's Medication Changes          These changes are accurate as of 1/31/18  1:32 PM.  If  you have any questions, ask your nurse or doctor.               These medicines have changed or have updated prescriptions.        Dose/Directions    amLODIPine 10 MG tablet   Commonly known as:  NORVASC   This may have changed:  when to take this   Used for:  HTN (hypertension)        Dose:  10 mg   Take 1 tablet (10 mg) by mouth daily   Quantity:  90 tablet   Refills:  3       fludrocortisone 0.1 MG tablet   Commonly known as:  FLORINEF   This may have changed:  how much to take   Used for:  Hyperkalemia   Changed by:  Cinda Cazares NP        Dose:  0.1 mg   Take 1 tablet (0.1 mg) by mouth daily   Quantity:  90 tablet   Refills:  3       furosemide 20 MG tablet   Commonly known as:  LASIX   This may have changed:  how much to take   Used for:  Hyperkalemia   Changed by:  Cinda Cazares NP        Dose:  40 mg   Take 2 tablets (40 mg) by mouth 2 times daily   Quantity:  200 tablet   Refills:  3       sodium bicarbonate 650 MG tablet   This may have changed:  how much to take   Used for:  Hyperkalemia   Changed by:  Cinda Cazares NP        Dose:  650 mg   Take 1 tablet (650 mg) by mouth 3 times daily   Quantity:  180 tablet   Refills:  3            Where to get your medicines      These medications were sent to [facility] [address] [place] [address] AT [facility] [address]  [address] [place] [address]     Phone:  [phone]     fludrocortisone 0.1 MG tablet    furosemide 20 MG tablet         Some of these will need a paper prescription and others can be bought over the counter.  Ask your nurse if you have questions.     You don't need a prescription for these medications     magnesium oxide 400 (241.3 MG) MG tablet    sodium bicarbonate 650 MG tablet                Primary Care Provider Office Phone # Fax #    Shay Kirkpatrick [phone]       [address] [place] [address] [address] [place] [address]        Equal Access to Services     FRANKLIN PORTILLO  AH: Todd lopeznairadha Orestes, waaxda luqadaha, qaybta kazeb dolan, devi noelin hayaachristal macias tay rafa duckworth. So [facility] [phone].    ATENCIÓN: Si habla radha, tiene a zheng disposición servicios gratuitos de asistencia lingüística. Llame al [phone].    We comply with applicable federal civil rights laws and [place] laws. We do not discriminate on the basis of race, color, national origin, age, disability, sex, sexual orientation, or gender identity.            Thank you!     Thank you for choosing [facility] NEPHR[facility]  for your care. Our goal is always to provide you with excellent care. Hearing back from our patients is one way we can continue to improve our services. Please take a few minutes to complete the written survey that you may receive in the mail after your visit with us. Thank you!             Your Updated Medication List - Protect others around you: Learn how to safely use, store and throw away your medicines at www.disposemymeds.org.          This list is accurate as of 1/31/18  1:32 PM.  Always use your most recent med list.                   Brand Name Dispense Instructions for use Diagnosis    amLODIPine 10 MG tablet    NORVASC    90 tablet    Take 1 tablet (10 mg) by mouth daily    HTN (hypertension)       cholecalciferol 1000 UNIT tablet    vitamin D3    100 tablet    Take 1 tablet (1,000 Units) by mouth daily    Vitamin D deficiency       CIALIS 5 MG tablet   Generic drug:  tadalafil      Take 5 mg by mouth as needed        cyclobenzaprine 10 MG tablet    FLEXERIL    90 tablet    Take 1 tablet (10 mg) by mouth 3 times daily as needed for muscle spasms    Immunosuppression (H)       fludrocortisone 0.1 MG tablet    FLORINEF    90 tablet    Take 1 tablet (0.1 mg) by mouth daily    Hyperkalemia       furosemide 20 MG tablet    LASIX    200 tablet    Take 2 tablets (40 mg) by mouth 2 times daily    Hyperkalemia       GABAPENTIN PO      Take 300 mg by mouth 3 times daily         insulin glargine 100 UNIT/ML injection    LANTUS     Inject 50 Units Subcutaneous every morning        linagliptin 5 MG Tabs tablet    TRADJENTA     Take 5 mg by mouth every evening        magnesium oxide 400 (241.3 MG) MG tablet    MAG-OX    30 tablet    Take 1 tablet (400 mg) by mouth daily    Hypomagnesemia, CKD (chronic kidney disease) stage 3, GFR 30-59 ml/min       mycophenolic acid 360 MG EC tablet    GENERIC EQUIVALENT    120 tablet    Take 2 tablets (720 mg) by mouth every 12 hours    History of liver transplant (H), Long-term use of immunosuppressant medication       OMEPRAZOLE PO      Take 20 mg by mouth every morning        oxyCODONE IR 10 MG tablet    ROXICODONE    30 tablet    Take 1 tablet (10 mg) by mouth daily as needed for moderate to severe pain    Osteoarthritis       patiromer 8.4 G packet    VELTASSA    30 each    Take 8.4 g by mouth daily    Hyperkalemia       polyethylene glycol powder    MIRALAX    510 g    Take 17 g (1 capful) by mouth daily    Ileostomy in place (H)       predniSONE 5 MG tablet    DELTASONE    30 tablet    Take 1 tablet (5 mg) by mouth daily    Liver transplant recipient (H), Long-term use of immunosuppressant medication       sodium bicarbonate 650 MG tablet     180 tablet    Take 1 tablet (650 mg) by mouth 3 times daily    Hyperkalemia       tacrolimus 1 MG capsule    GENERIC EQUIVALENT    60 capsule    Take 1 capsule (1 mg) by mouth every 12 hours    Liver transplant recipient (H)

## 2018-01-31 NOTE — NURSING NOTE
Chief Complaint   Patient presents with     RECHECK     Kidney follow up       Initial /84  Pulse 80  Ht 1.829 m (6')  Wt 101.2 kg (223 lb)  SpO2 98%  BMI 30.24 kg/m2 Estimated body mass index is 30.24 kg/(m^2) as calculated from the following:    Height as of this encounter: 1.829 m (6').    Weight as of this encounter: 101.2 kg (223 lb).  Medication Reconciliation: bentley NINA CMA

## 2018-01-31 NOTE — TELEPHONE ENCOUNTER
Spoke with Camacho on Tues, reviewed increased protein in urine, request per  that he repeat his urine test on Wed before his appt with Jovanna Foster.   would like him to be seen by one of the nephrologist if test remains high.

## 2018-01-31 NOTE — PATIENT INSTRUCTIONS
1. Check blood pressures daily and record, bring in next visit  2. Increase Lasix to 40 mg twice per day  3. Decrease Bicarb to 650 mg three times/day  4. Decrease florinef to 0.1 mg daily  5. Check daily weights  6. Labs in one week  7. RTC one month for f/u

## 2018-02-01 ENCOUNTER — MYC MEDICAL ADVICE (OUTPATIENT)
Dept: SURGERY | Facility: CLINIC | Age: 53
End: 2018-02-01

## 2018-02-01 DIAGNOSIS — R80.9 PROTEINURIA: Primary | ICD-10-CM

## 2018-02-01 DIAGNOSIS — E87.5 HYPERKALEMIA: ICD-10-CM

## 2018-02-05 ENCOUNTER — OFFICE VISIT (OUTPATIENT)
Dept: TRANSPLANT | Facility: CLINIC | Age: 53
End: 2018-02-05
Attending: TRANSPLANT SURGERY
Payer: COMMERCIAL

## 2018-02-05 VITALS
HEIGHT: 72 IN | HEART RATE: 82 BPM | TEMPERATURE: 98.8 F | OXYGEN SATURATION: 96 % | DIASTOLIC BLOOD PRESSURE: 101 MMHG | WEIGHT: 222.8 LBS | SYSTOLIC BLOOD PRESSURE: 195 MMHG | BODY MASS INDEX: 30.18 KG/M2 | RESPIRATION RATE: 16 BRPM

## 2018-02-05 DIAGNOSIS — D63.1 ANEMIA IN STAGE 3 CHRONIC KIDNEY DISEASE (H): ICD-10-CM

## 2018-02-05 DIAGNOSIS — N18.30 CKD (CHRONIC KIDNEY DISEASE) STAGE 3, GFR 30-59 ML/MIN (H): ICD-10-CM

## 2018-02-05 DIAGNOSIS — E83.42 HYPOMAGNESEMIA: ICD-10-CM

## 2018-02-05 DIAGNOSIS — Z79.60 LONG-TERM USE OF IMMUNOSUPPRESSANT MEDICATION: ICD-10-CM

## 2018-02-05 DIAGNOSIS — Z94.4 LIVER REPLACED BY TRANSPLANT (H): Primary | ICD-10-CM

## 2018-02-05 DIAGNOSIS — N18.30 ANEMIA IN STAGE 3 CHRONIC KIDNEY DISEASE (H): ICD-10-CM

## 2018-02-05 DIAGNOSIS — Z94.4 LIVER REPLACED BY TRANSPLANT (H): ICD-10-CM

## 2018-02-05 LAB
ALBUMIN SERPL-MCNC: 3 G/DL (ref 3.4–5)
ALP SERPL-CCNC: 148 U/L (ref 40–150)
ALT SERPL W P-5'-P-CCNC: 16 U/L (ref 0–70)
ANION GAP SERPL CALCULATED.3IONS-SCNC: 6 MMOL/L (ref 3–14)
AST SERPL W P-5'-P-CCNC: 23 U/L (ref 0–45)
BILIRUB DIRECT SERPL-MCNC: 0.1 MG/DL (ref 0–0.2)
BILIRUB SERPL-MCNC: 0.4 MG/DL (ref 0.2–1.3)
BUN SERPL-MCNC: 24 MG/DL (ref 7–30)
CALCIUM SERPL-MCNC: 7.6 MG/DL (ref 8.5–10.1)
CHLORIDE SERPL-SCNC: 107 MMOL/L (ref 94–109)
CO2 SERPL-SCNC: 30 MMOL/L (ref 20–32)
CREAT SERPL-MCNC: 1.33 MG/DL (ref 0.66–1.25)
ERYTHROCYTE [DISTWIDTH] IN BLOOD BY AUTOMATED COUNT: 15.6 % (ref 10–15)
FERRITIN SERPL-MCNC: 1014 NG/ML (ref 26–388)
GFR SERPL CREATININE-BSD FRML MDRD: 56 ML/MIN/1.7M2
GLUCOSE SERPL-MCNC: 135 MG/DL (ref 70–99)
HCT VFR BLD AUTO: 25.7 % (ref 40–53)
HGB BLD-MCNC: 8.4 G/DL (ref 13.3–17.7)
IRON SATN MFR SERPL: 26 % (ref 15–46)
IRON SERPL-MCNC: 44 UG/DL (ref 35–180)
MAGNESIUM SERPL-MCNC: 2.1 MG/DL (ref 1.6–2.3)
MCH RBC QN AUTO: 30.4 PG (ref 26.5–33)
MCHC RBC AUTO-ENTMCNC: 32.7 G/DL (ref 31.5–36.5)
MCV RBC AUTO: 93 FL (ref 78–100)
PHOSPHATE SERPL-MCNC: 2.8 MG/DL (ref 2.5–4.5)
PLATELET # BLD AUTO: 79 10E9/L (ref 150–450)
POTASSIUM SERPL-SCNC: 3.3 MMOL/L (ref 3.4–5.3)
PROT SERPL-MCNC: 6.9 G/DL (ref 6.8–8.8)
RBC # BLD AUTO: 2.76 10E12/L (ref 4.4–5.9)
SODIUM SERPL-SCNC: 142 MMOL/L (ref 133–144)
TACROLIMUS BLD-MCNC: <3 UG/L (ref 5–15)
TIBC SERPL-MCNC: 166 UG/DL (ref 240–430)
TME LAST DOSE: ABNORMAL H
WBC # BLD AUTO: 4.3 10E9/L (ref 4–11)

## 2018-02-05 PROCEDURE — 84075 ASSAY ALKALINE PHOSPHATASE: CPT

## 2018-02-05 PROCEDURE — 36415 COLL VENOUS BLD VENIPUNCTURE: CPT

## 2018-02-05 PROCEDURE — 80197 ASSAY OF TACROLIMUS: CPT

## 2018-02-05 PROCEDURE — 84460 ALANINE AMINO (ALT) (SGPT): CPT

## 2018-02-05 PROCEDURE — 82247 BILIRUBIN TOTAL: CPT

## 2018-02-05 PROCEDURE — G0463 HOSPITAL OUTPT CLINIC VISIT: HCPCS | Mod: ZF

## 2018-02-05 PROCEDURE — 85027 COMPLETE CBC AUTOMATED: CPT

## 2018-02-05 PROCEDURE — 83540 ASSAY OF IRON: CPT

## 2018-02-05 PROCEDURE — 84450 TRANSFERASE (AST) (SGOT): CPT

## 2018-02-05 PROCEDURE — 83550 IRON BINDING TEST: CPT

## 2018-02-05 PROCEDURE — 82728 ASSAY OF FERRITIN: CPT

## 2018-02-05 PROCEDURE — 82248 BILIRUBIN DIRECT: CPT

## 2018-02-05 PROCEDURE — 80069 RENAL FUNCTION PANEL: CPT

## 2018-02-05 PROCEDURE — 84155 ASSAY OF PROTEIN SERUM: CPT

## 2018-02-05 PROCEDURE — 83735 ASSAY OF MAGNESIUM: CPT

## 2018-02-05 RX ORDER — PREDNISONE 2.5 MG/1
2.5 TABLET ORAL DAILY
Qty: 90 TABLET | Refills: 3 | Status: SHIPPED | OUTPATIENT
Start: 2018-02-05 | End: 2019-01-25

## 2018-02-05 ASSESSMENT — PAIN SCALES - GENERAL: PAINLEVEL: NO PAIN (0)

## 2018-02-05 NOTE — LETTER
2/5/2018       RE: Camacho Bhagat  [address] [facility] [place] [address]     Dear Colleague,    Thank you for referring your patient, Camacho Bhagat, to the [facility] SOLID ORGAN TRANSPLANT at [facility]. Please see a copy of my visit note below.      Transplant Surgery -OUTPATIENT IMMUNOSUPPRESSION PROGRESS NOTE    Date of Visit: 02/05/2018    Transplants:  3/4/2017 (Liver); Postoperative day:  338  ASSESMENT AND PLAN:  1.Graft Function: Liver allograft: no rejection or technical problems.    2.Immunosuppression Management: Keep tacrolimus levels 3-5, decrease prednisone to 2.5mg  Qd and continue cellcept  3.Hypertension: ok  4.Renal Function: good, had severe proteinuria, recommend Nephrology consult  5.Lab frequency: q 2 weekly  6.Other:  Wound has a small pocket at the upper end, wound packed with Gauze  Size of wound 2.5 X 1 cm    Date: February 5, 2018    Transplant:  [x]                             Liver [x]                              Kidney []                             Pancreas []                              Other:             Chief Complaint:  Doing well, has wound discharge    History of Present Illness:  Patient Active Problem List   Diagnosis     Carpal tunnel syndrome     Testicular hypofunction     Fibromyalgia     Sicca syndrome (H)     Medication refill- do not delete      Hepatocellular carcinoma (H)     Osteoarthritis     Rheumatoid arthritis (H)     Hyperkalemia     Liver transplant recipient (H)     Immunosuppressed status (H)     Neutropenic colitis (H)     S/P liver transplant (H)     Pancytopenia (H)     Gangrene of finger (H)     Ischemia of both lower extremities     Elevated serum creatinine     History of creation of ostomy     Peristomal skin complication     Painful periwound skin     Encounter for attention to ileostomy (H)     Ileostomy in place (H)     SOCIAL /FAMILY HISTORY: [x]                  No recent change    Past Medical History:    Diagnosis Date     Depressive disorder, not elsewhere classified      Diabetes type 2, controlled (H) 11/10/2016     Esophageal reflux      Fibromyalgia 1/2009    dx with Dr Benitez( Rheum)     Gangrene of finger (H) 8/25/2017     H/O deep venous thrombosis 11/2001    Permanent IVC filter in place.     H/O CASTAÑEDA (nonalcoholic steatohepatitis)      H/O Pneumonia, organism unspecified(486) 10/2001    Included ARDS, sepsis, and  acute renal failure; hospitalized, required tracheostomy placement.     H/O: HTN (hypertension) 11/2001    No longer prescribed antihypertensive medication.       History of hepatocellular carcinoma      History of liver transplant (H)      History of obstructive sleep apnea     No longer wears CPAP since losing approximately 200 pounds with his liver transplant and its complications.       HLD (hyperlipidemia)      Ischemia of both lower extremities 8/25/2017    Distal ischemia due to shock/high pressor requirements     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     Osteoarthritis      Presence of PERMANENT IVC filter      Rheumatoid arthritis(714.0)      Past Surgical History:   Procedure Laterality Date     BENCH LIVER N/A 3/4/2017    Procedure: BENCH LIVER;  Surgeon: Jovan Tran MD;  Location: [facility] TOTAL KNEE ARTHROPLASTY  2008    Right knee arthroscopy     CHOLECYSTECTOMY       COLONOSCOPY N/A 7/21/2017    Procedure: COMBINED COLONOSCOPY, SINGLE OR MULTIPLE BIOPSY/POLYPECTOMY BY BIOPSY;  Colonoscopy;  Surgeon: Izaiah Montes MD;  Location:  GI     ENT SURGERY      Repair of deviated septum     ESOPHAGOSCOPY, GASTROSCOPY, DUODENOSCOPY (EGD), COMBINED N/A 8/4/2016    Procedure: COMBINED ESOPHAGOSCOPY, GASTROSCOPY, DUODENOSCOPY (EGD), BIOPSY SINGLE OR MULTIPLE;  Surgeon: Trent Pederson MD;  Location:  GI     ESOPHAGOSCOPY, GASTROSCOPY, DUODENOSCOPY (EGD), COMBINED N/A 8/27/2017    Procedure: COMBINED ESOPHAGOSCOPY, GASTROSCOPY, DUODENOSCOPY (EGD);;  Surgeon: Wallace Marx  Los Rosas MD;  Location: [facility] GI     LAPAROTOMY EXPLORATORY N/A 2017    Procedure: LAPAROTOMY EXPLORATORY;  Exploratory Laparotomy, washout;  Surgeon: Tip Zhang MD;  Location: [facility] OR     LAPAROTOMY EXPLORATORY N/A 2017    Procedure: LAPAROTOMY EXPLORATORY;  Exploratory Laparotomy, Washout with closure.;  Surgeon: Tip Zhang MD;  Location: [facility]     LAPAROTOMY EXPLORATORY N/A 2017    Procedure: LAPAROTOMY EXPLORATORY;  Exploratory Laparotomy, Right angelique-colectomy, end ileostomy, mucosal fistula, partial omentectomy;  Surgeon: Sara Dinh MD;  Location: [facility] OR     ORTHOPEDIC SURGERY Right     Repair of dislocated wrist.       RELEASE TRIGGER FINGER Right 11/10/2017    Procedure: RELEASE TRIGGER FINGER;  Debride, V-Y Flap Right Index Finger;  Surgeon: Momo Noonan MD;  [facility]: [facility]     TAKEDOWN ILEOSTOMY N/A 2018    Procedure: TAKEDOWN ILEOSTOMY;  Exploratory Laparotomy, Lysis of Adhesions, Takedown Of End Ileostomy, Takedown of mucocutaneous fistula, ileocolic resection  And Colorectal Anastomosis, Primary repair of Ventral Hernia, Anesthesia Block ;  Surgeon: Sara Dinh MD;  Location: [facility] OR     TRACHEOSTOMY  2001    During hospitalization for pneumonia.       TRANSPLANT LIVER RECIPIENT  DONOR N/A 3/4/2017    Procedure: TRANSPLANT LIVER RECIPIENT  DONOR;  Surgeon: Jovan Tran MD;  Location: [facility] OR     Social History     Social History     Marital status:      Spouse name: N/A     Number of children: 1     Years of education: N/A     Occupational History            Social History Main Topics     Smoking status: Former Smoker     Packs/day: 0.75     Years: 2.00     Quit date:      Smokeless tobacco: Former User     Types: Chew     Quit date: 10/8/2015     Alcohol use No      Comment: No alcohol since liver transplant.       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         Prescription Medications as of 2/5/2018             fludrocortisone (FLORINEF) 0.1 MG tablet Take 1 tablet (0.1 mg) by mouth daily    furosemide (LASIX) 20 MG tablet Take 2 tablets (40 mg) by mouth 2 times daily    magnesium oxide (MAG-OX) 400 (241.3 MG) MG tablet Take 1 tablet (400 mg) by mouth daily    sodium bicarbonate 650 MG tablet Take 1 tablet (650 mg) by mouth 3 times daily    polyethylene glycol (MIRALAX) powder Take 17 g (1 capful) by mouth daily    cholecalciferol (VITAMIN D3) 1000 UNIT tablet Take 1 tablet (1,000 Units) by mouth daily    predniSONE (DELTASONE) 5 MG tablet Take 1 tablet (5 mg) by mouth daily    mycophenolic acid (GENERIC EQUIVALENT) 360 MG EC tablet Take 2 tablets (720 mg) by mouth every 12 hours    patiromer (VELTASSA) 8.4 G packet Take 8.4 g by mouth daily    tacrolimus (GENERIC EQUIVALENT) 1 MG capsule Take 1 capsule (1 mg) by mouth every 12 hours    amLODIPine (NORVASC) 10 MG tablet Take 1 tablet (10 mg) by mouth daily    oxyCODONE IR (ROXICODONE) 10 MG tablet Take 1 tablet (10 mg) by mouth daily as needed for moderate to severe pain    CIALIS 5 MG tablet Take 5 mg by mouth as needed     OMEPRAZOLE PO Take 20 mg by mouth every morning     GABAPENTIN PO Take 300 mg by mouth 3 times daily    insulin glargine (LANTUS) 100 UNIT/ML injection Inject 50 Units Subcutaneous every morning    linagliptin (TRADJENTA) 5 MG TABS tablet Take 5 mg by mouth every evening     cyclobenzaprine (FLEXERIL) 10 MG tablet Take 1 tablet (10 mg) by mouth 3 times daily as needed for muscle spasms        Erythromycin and Vioxx   REVIEW OF SYSTEMS (check box if normal)  [x]               GENERAL  [x]                 PULMONARY [x]                GENITOURINARY  [x]                CNS                 [x]                 CARDIAC  [x]                 ENDOCRINE  [x]                 EARS,NOSE,THROAT [x]                 GASTROINTESTINAL [x]                 NEUROLOGIC    [x]                MUSCLOSKELTAL  [x]                  HEMATOLOGY      PHYSICAL EXAM (check box if normal)BP (!) 195/101  Pulse 82  Temp 98.8  F (37.1  C) (Oral)  Resp 16  Ht 1.829 m (6')  Wt 101.1 kg (222 lb 12.8 oz)  SpO2 96%  BMI 30.22 kg/m2        [x]            GENERAL:    [x]       EYES:  ICTERIC   []        YES  []                    NO  [x]           EXTREMITIES: Clubbing []       Y     [x]           N    [x]           EARS, NOSE, THROAT: Membranes Moist    YES   [x]                   NO []                  [x]           LUNGS:  CLEAR    YES       [x]                  NO    []                                [x]           SKIN: Jaundice           YES       []                  NO    [x]                   Rash: YES       []                  NO    [x]                                     [x]             HEART: Regular Rate          YES       [x]                  NO    []                   Incision Clean:  YES       [x]                  NO    []                                [x]                    ABDOMEN: Organomegaly YES       []                  NO    [x]                       [x]                    NEUROLOGICAL:  Nonfocal  YES       [x]                  NO    []                       [x]                    Hernia YES       []                  NO    [x]                   PSYCHIATRIC:  Appropriate  YES       [x]                  NO    []                       OTHER:                                                                                                   PAIN SCALE:: 3    Again, thank you for allowing me to participate in the care of your patient.      Sincerely,    Jovan Tran MD

## 2018-02-05 NOTE — NURSING NOTE
Chief Complaint   Patient presents with     Surgical Followup     ileostomy take down 1/05/2018       Initial BP (!) 195/101  Pulse 82  Temp 98.8  F (37.1  C) (Oral)  Resp 16  Ht 1.829 m (6')  Wt 101.1 kg (222 lb 12.8 oz)  SpO2 96%  BMI 30.22 kg/m2 Estimated body mass index is 30.22 kg/(m^2) as calculated from the following:    Height as of this encounter: 1.829 m (6').    Weight as of this encounter: 101.1 kg (222 lb 12.8 oz).  Medication Reconciliation: complete

## 2018-02-05 NOTE — PROGRESS NOTES
HPI      ROS      Physical Exam    Transplant Surgery -OUTPATIENT IMMUNOSUPPRESSION PROGRESS NOTE    Date of Visit: 02/05/2018    Transplants:  3/4/2017 (Liver); Postoperative day:  338  ASSESMENT AND PLAN:  1.Graft Function: Liver allograft: no rejection or technical problems.    2.Immunosuppression Management: Keep tacrolimus levels 3-5, decrease prednisone to 2.5mg  Qd and continue cellcept  3.Hypertension: ok  4.Renal Function: good, had severe proteinuria, recommend Nephrology consult  5.Lab frequency: q 2 weekly  6.Other:  Wound has a small pocket at the upper end, wound packed with Gauze  Size of wound 2.5 X 1 cm    Date: February 5, 2018    Transplant:  [x]                             Liver [x]                              Kidney []                             Pancreas []                              Other:             Chief Complaint:  Doing well, has wound discharge    History of Present Illness:  Patient Active Problem List   Diagnosis     Carpal tunnel syndrome     Testicular hypofunction     Fibromyalgia     Sicca syndrome (H)     Medication refill- do not delete      Hepatocellular carcinoma (H)     Osteoarthritis     Rheumatoid arthritis (H)     Hyperkalemia     Liver transplant recipient (H)     Immunosuppressed status (H)     Neutropenic colitis (H)     S/P liver transplant (H)     Pancytopenia (H)     Gangrene of finger (H)     Ischemia of both lower extremities     Elevated serum creatinine     History of creation of ostomy     Peristomal skin complication     Painful periwound skin     Encounter for attention to ileostomy (H)     Ileostomy in place (H)     SOCIAL /FAMILY HISTORY: [x]                  No recent change    Past Medical History:   Diagnosis Date     Depressive disorder, not elsewhere classified      Diabetes type 2, controlled (H) 11/10/2016     Esophageal reflux      Fibromyalgia 1/2009    dx with Dr Benitez( Rheum)     Gangrene of finger (H) 8/25/2017     H/O deep venous thrombosis  11/2001    Permanent IVC filter in place.     H/O CASTAÑEDA (nonalcoholic steatohepatitis)      H/O Pneumonia, organism unspecified(486) 10/2001    Included ARDS, sepsis, and  acute renal failure; hospitalized, required tracheostomy placement.     H/O: HTN (hypertension) 11/2001    No longer prescribed antihypertensive medication.       History of hepatocellular carcinoma      History of liver transplant (H)      History of obstructive sleep apnea     No longer wears CPAP since losing approximately 200 pounds with his liver transplant and its complications.       HLD (hyperlipidemia)      Ischemia of both lower extremities 8/25/2017    Distal ischemia due to shock/high pressor requirements     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     Osteoarthritis      Presence of PERMANENT IVC filter      Rheumatoid arthritis(714.0)      Past Surgical History:   Procedure Laterality Date     BENCH LIVER N/A 3/4/2017    Procedure: BENCH LIVER;  Surgeon: Jovan Tran MD;  Location:  OR      TOTAL KNEE ARTHROPLASTY  2008    Right knee arthroscopy     CHOLECYSTECTOMY       COLONOSCOPY N/A 7/21/2017    Procedure: COMBINED COLONOSCOPY, SINGLE OR MULTIPLE BIOPSY/POLYPECTOMY BY BIOPSY;  Colonoscopy;  Surgeon: Izaiah Montes MD;  Location:  GI     ENT SURGERY      Repair of deviated septum     ESOPHAGOSCOPY, GASTROSCOPY, DUODENOSCOPY (EGD), COMBINED N/A 8/4/2016    Procedure: COMBINED ESOPHAGOSCOPY, GASTROSCOPY, DUODENOSCOPY (EGD), BIOPSY SINGLE OR MULTIPLE;  Surgeon: Trent Pederson MD;  Location:  GI     ESOPHAGOSCOPY, GASTROSCOPY, DUODENOSCOPY (EGD), COMBINED N/A 8/27/2017    Procedure: COMBINED ESOPHAGOSCOPY, GASTROSCOPY, DUODENOSCOPY (EGD);;  Surgeon: Los Wynn MD;  Location:  GI     LAPAROTOMY EXPLORATORY N/A 7/4/2017    Procedure: LAPAROTOMY EXPLORATORY;  Exploratory Laparotomy, washout;  Surgeon: Tip hZang MD;  Location:  OR     LAPAROTOMY EXPLORATORY N/A 7/5/2017    Procedure:  LAPAROTOMY EXPLORATORY;  Exploratory Laparotomy, Washout with closure.;  Surgeon: Tip Zhang MD;  Location: [facility] OR     LAPAROTOMY EXPLORATORY N/A 2017    Procedure: LAPAROTOMY EXPLORATORY;  Exploratory Laparotomy, Right angelique-colectomy, end ileostomy, mucosal fistula, partial omentectomy;  Surgeon: Sara Dinh MD;  Location: [facility] OR     ORTHOPEDIC SURGERY Right     Repair of dislocated wrist.       RELEASE TRIGGER FINGER Right 11/10/2017    Procedure: RELEASE TRIGGER FINGER;  Debride, V-Y Flap Right Index Finger;  Surgeon: Momo Noonan MD;  [facility]     TAKEDOWN ILEOSTOMY N/A 2018    Procedure: TAKEDOWN ILEOSTOMY;  Exploratory Laparotomy, Lysis of Adhesions, Takedown Of End Ileostomy, Takedown of mucocutaneous fistula, ileocolic resection  And Colorectal Anastomosis, Primary repair of Ventral Hernia, Anesthesia Block ;  Surgeon: Sara Dinh MD;  Location: [facility] OR     TRACHEOSTOMY  2001    During hospitalization for pneumonia.       TRANSPLANT LIVER RECIPIENT  DONOR N/A 3/4/2017    Procedure: TRANSPLANT LIVER RECIPIENT  DONOR;  Surgeon: Jovan Tran MD;  Location: [facility] OR     Social History     Social History     Marital status:      Spouse name: N/A     Number of children: 1     Years of education: N/A     Occupational History            Social History Main Topics     Smoking status: Former Smoker     Packs/day: 0.75     Years: 2.00     Quit date:      Smokeless tobacco: Former User     Types: Chew     Quit date: 10/8/2015     Alcohol use No      Comment: No alcohol since liver transplant.       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         Prescription Medications as of 2018             fludrocortisone (FLORINEF) 0.1 MG tablet Take 1  tablet (0.1 mg) by mouth daily    furosemide (LASIX) 20 MG tablet Take 2 tablets (40 mg) by mouth 2 times daily    magnesium oxide (MAG-OX) 400 (241.3 MG) MG tablet Take 1 tablet (400 mg) by mouth daily    sodium bicarbonate 650 MG tablet Take 1 tablet (650 mg) by mouth 3 times daily    polyethylene glycol (MIRALAX) powder Take 17 g (1 capful) by mouth daily    cholecalciferol (VITAMIN D3) 1000 UNIT tablet Take 1 tablet (1,000 Units) by mouth daily    predniSONE (DELTASONE) 5 MG tablet Take 1 tablet (5 mg) by mouth daily    mycophenolic acid (GENERIC EQUIVALENT) 360 MG EC tablet Take 2 tablets (720 mg) by mouth every 12 hours    patiromer (VELTASSA) 8.4 G packet Take 8.4 g by mouth daily    tacrolimus (GENERIC EQUIVALENT) 1 MG capsule Take 1 capsule (1 mg) by mouth every 12 hours    amLODIPine (NORVASC) 10 MG tablet Take 1 tablet (10 mg) by mouth daily    oxyCODONE IR (ROXICODONE) 10 MG tablet Take 1 tablet (10 mg) by mouth daily as needed for moderate to severe pain    CIALIS 5 MG tablet Take 5 mg by mouth as needed     OMEPRAZOLE PO Take 20 mg by mouth every morning     GABAPENTIN PO Take 300 mg by mouth 3 times daily    insulin glargine (LANTUS) 100 UNIT/ML injection Inject 50 Units Subcutaneous every morning    linagliptin (TRADJENTA) 5 MG TABS tablet Take 5 mg by mouth every evening     cyclobenzaprine (FLEXERIL) 10 MG tablet Take 1 tablet (10 mg) by mouth 3 times daily as needed for muscle spasms        Erythromycin and Vioxx   REVIEW OF SYSTEMS (check box if normal)  [x]               GENERAL  [x]                 PULMONARY [x]                GENITOURINARY  [x]                CNS                 [x]                 CARDIAC  [x]                 ENDOCRINE  [x]                EARS,NOSE,THROAT [x]                 GASTROINTESTINAL [x]                 NEUROLOGIC    [x]                MUSCLOSKELTAL  [x]                  HEMATOLOGY      PHYSICAL EXAM (check box if normal)BP (!) 195/101  Pulse 82  Temp 98.8  F (37.1   C) (Oral)  Resp 16  Ht 1.829 m (6')  Wt 101.1 kg (222 lb 12.8 oz)  SpO2 96%  BMI 30.22 kg/m2        [x]            GENERAL:    [x]       EYES:  ICTERIC   []        YES  []                    NO  [x]           EXTREMITIES: Clubbing []       Y     [x]           N    [x]           EARS, NOSE, THROAT: Membranes Moist    YES   [x]                   NO []                  [x]           LUNGS:  CLEAR    YES       [x]                  NO    []                                [x]           SKIN: Jaundice           YES       []                  NO    [x]                   Rash: YES       []                  NO    [x]                                     [x]             HEART: Regular Rate          YES       [x]                  NO    []                   Incision Clean:  YES       [x]                  NO    []                                [x]                    ABDOMEN: Organomegaly YES       []                  NO    [x]                       [x]                    NEUROLOGICAL:  Nonfocal  YES       [x]                  NO    []                       [x]                    Hernia YES       []                  NO    [x]                   PSYCHIATRIC:  Appropriate  YES       [x]                  NO    []                       OTHER:                                                                                                   PAIN SCALE:: 3

## 2018-02-05 NOTE — MR AVS SNAPSHOT
After Visit Summary   2/5/2018    Camacho Bhagat    MRN: [id number]           Patient Information     Date Of Birth          1965        Visit Information        Provider Department      2/5/2018 10:50 AM Jovan Tran MD [facility] Solid Organ Transplant        Today's Diagnoses     Liver replaced by transplant (H)    -  1       Follow-ups after your visit        Your next 10 appointments already scheduled     Feb 05, 2018 10:50 AM CST   (Arrive by 10:35 AM)   Liver Return Post Op with Jovan Tran MD   [facility] Solid Organ Transplant ([facility])    [address] [place] [address]  [address]  [facility] [address]   [phone]            Feb 28, 2018  1:00 PM CST   (Arrive by 12:30 PM)   Return Visit with Cinda Cazares NP   [facility] Nephrology ([facility])    [address] [place] [address]  [address]  [facility] [address]   [phone]            Mar 02, 2018 10:45 AM CST   (Arrive by 10:30 AM)   Return Liver Transplant with Toñito Camejo MD   [facility] Hepatology ([facility])    [address] [place] [address]  [address]  [facility] [address]   [phone]              Who to contact     If you have questions or need follow up information about today's clinic visit or your schedule please contact [facility] SOLID ORGAN TRANSPLANT directly at [phone].  Normal or non-critical lab and imaging results will be communicated to you by MyChart, letter or phone within 4 business days after the clinic has received the results. If you do not hear from us within 7 days, please contact the clinic through MyChart or phone. If you have a critical or abnormal lab result, we will notify you by phone as soon as possible.  Submit refill requests through [employer] or call your pharmacy and they will forward the refill request to us. Please allow 3 business days for your refill to be completed.          Additional Information About Your  Visit        [facility] Information     [employer] gives you secure access to your electronic health record. If you see a primary care provider, you can also send messages to your care team and make appointments. If you have questions, please call your primary care clinic.  If you do not have a primary care provider, please call [phone] and they will assist you.        Care EveryWhere ID     This is your Care EveryWhere ID. This could be used by other organizations to access your [facility] medical records  [id number]        Your Vitals Were     Pulse Temperature Respirations Height Pulse Oximetry BMI (Body Mass Index)    82 98.8  F (37.1  C) (Oral) 16 1.829 m (6') 96% 30.22 kg/m2       Blood Pressure from Last 3 Encounters:   02/05/18 (!) 195/101   01/31/18 164/84   01/19/18 166/85    Weight from Last 3 Encounters:   02/05/18 101.1 kg (222 lb 12.8 oz)   01/31/18 101.2 kg (223 lb)   01/19/18 102.1 kg (225 lb 1.6 oz)              Today, you had the following     No orders found for display         Today's Medication Changes          These changes are accurate as of 2/5/18 10:42 AM.  If you have any questions, ask your nurse or doctor.               These medicines have changed or have updated prescriptions.        Dose/Directions    amLODIPine 10 MG tablet   Commonly known as:  NORVASC   This may have changed:  when to take this   Used for:  HTN (hypertension)        Dose:  10 mg   Take 1 tablet (10 mg) by mouth daily   Quantity:  90 tablet   Refills:  3                Primary Care Provider Office Phone # Fax #    Shay Kirkpatrick [phone]       [address] [place] [address] [address] [place] [address]        Equal Access to Services     JOSE [facility] AH: Hadii tavo [place], wakpda lugarfield, qaybta kaalmada devi dolan. So [facility] [phone].    ATENCIÓN: Si habla español, tiene a zheng disposición servicios gratuitos de asistencia lingüística. Llame al  [phone].    We comply with applicable federal civil rights laws and [place] laws. We do not discriminate on the basis of race, color, national origin, age, disability, sex, sexual orientation, or gender identity.            Thank you!     Thank you for choosing [facility] SOLID ORGAN TRANSPLANT  for your care. Our goal is always to provide you with excellent care. Hearing back from our patients is one way we can continue to improve our services. Please take a few minutes to complete the written survey that you may receive in the mail after your visit with us. Thank you!             Your Updated Medication List - Protect others around you: Learn how to safely use, store and throw away your medicines at www.disposemymeds.org.          This list is accurate as of 2/5/18 10:42 AM.  Always use your most recent med list.                   Brand Name Dispense Instructions for use Diagnosis    amLODIPine 10 MG tablet    NORVASC    90 tablet    Take 1 tablet (10 mg) by mouth daily    HTN (hypertension)       cholecalciferol 1000 UNIT tablet    vitamin D3    100 tablet    Take 1 tablet (1,000 Units) by mouth daily    Vitamin D deficiency       CIALIS 5 MG tablet   Generic drug:  tadalafil      Take 5 mg by mouth as needed        cyclobenzaprine 10 MG tablet    FLEXERIL    90 tablet    Take 1 tablet (10 mg) by mouth 3 times daily as needed for muscle spasms    Immunosuppression (H)       fludrocortisone 0.1 MG tablet    FLORINEF    90 tablet    Take 1 tablet (0.1 mg) by mouth daily    Hyperkalemia       furosemide 20 MG tablet    LASIX    200 tablet    Take 2 tablets (40 mg) by mouth 2 times daily    Hyperkalemia       GABAPENTIN PO      Take 300 mg by mouth 3 times daily        insulin glargine 100 UNIT/ML injection    LANTUS     Inject 50 Units Subcutaneous every morning        linagliptin 5 MG Tabs tablet    TRADJENTA     Take 5 mg by mouth every evening        magnesium oxide 400 (241.3 MG) MG tablet    MAG-OX    30  tablet    Take 1 tablet (400 mg) by mouth daily    Hypomagnesemia, CKD (chronic kidney disease) stage 3, GFR 30-59 ml/min       mycophenolic acid 360 MG EC tablet    GENERIC EQUIVALENT    120 tablet    Take 2 tablets (720 mg) by mouth every 12 hours    History of liver transplant (H), Long-term use of immunosuppressant medication       OMEPRAZOLE PO      Take 20 mg by mouth every morning        oxyCODONE IR 10 MG tablet    ROXICODONE    30 tablet    Take 1 tablet (10 mg) by mouth daily as needed for moderate to severe pain    Osteoarthritis       patiromer 8.4 G packet    VELTASSA    30 each    Take 8.4 g by mouth daily    Hyperkalemia       polyethylene glycol powder    MIRALAX    510 g    Take 17 g (1 capful) by mouth daily    Ileostomy in place (H)       predniSONE 5 MG tablet    DELTASONE    30 tablet    Take 1 tablet (5 mg) by mouth daily    Liver transplant recipient (H), Long-term use of immunosuppressant medication       sodium bicarbonate 650 MG tablet     180 tablet    Take 1 tablet (650 mg) by mouth 3 times daily    Hyperkalemia       tacrolimus 1 MG capsule    GENERIC EQUIVALENT    60 capsule    Take 1 capsule (1 mg) by mouth every 12 hours    Liver transplant recipient (H)

## 2018-02-06 RX ORDER — FLUDROCORTISONE ACETATE 0.1 MG/1
0.1 TABLET ORAL DAILY
Qty: 90 TABLET | Refills: 3 | COMMUNITY
Start: 2018-02-06 | End: 2018-02-23

## 2018-02-06 NOTE — TELEPHONE ENCOUNTER
Per Jovanna, Monday is fine for labs to be done. Communicated to patient.  Cele Mancilla LPN  Nephrology

## 2018-02-06 NOTE — TELEPHONE ENCOUNTER
Spoke to patient per Jovanna Cazares NP. Informed him of recommendation to hold Valtessa and Florinef at this time and to have additional labs drawn this Friday if possible. Patient states that he usually has his labs drawn every Monday-will clarify if Monday is ok. glenystent states that he has been drinking orange juice and will eat more K rich foods. Patient reports that he has been checking his BP for about a week at home and around 140-160/80-90, he doesn't remember his HR. Patient states that when he comes to clinic or the  his BP is generally high due to traffic. Patient reports to be taking amlodipine 10 mg in am and lasix 40 mg BID and that he has swelling in both ankles but more prominent in Left. No other questions or concerns from patient, besides wanting to know if Monday is ok for labs.  Cele Mancilla LPN  Nephrology  [facility]  [phone]

## 2018-02-12 ENCOUNTER — HOSPITAL ENCOUNTER (EMERGENCY)
Facility: CLINIC | Age: 53
Discharge: ANOTHER HOSPITAL | End: 2018-02-12
Attending: EMERGENCY MEDICINE | Admitting: EMERGENCY MEDICINE
Payer: COMMERCIAL

## 2018-02-12 ENCOUNTER — TELEPHONE (OUTPATIENT)
Dept: TRANSPLANT | Facility: CLINIC | Age: 53
End: 2018-02-12

## 2018-02-12 ENCOUNTER — HOSPITAL ENCOUNTER (INPATIENT)
Facility: CLINIC | Age: 53
LOS: 2 days | Discharge: HOME | End: 2018-02-15
Attending: EMERGENCY MEDICINE | Admitting: INTERNAL MEDICINE
Payer: COMMERCIAL

## 2018-02-12 ENCOUNTER — APPOINTMENT (OUTPATIENT)
Dept: CT IMAGING | Facility: CLINIC | Age: 53
End: 2018-02-12
Attending: EMERGENCY MEDICINE
Payer: COMMERCIAL

## 2018-02-12 ENCOUNTER — HOSPITAL ENCOUNTER (OUTPATIENT)
Dept: LAB | Facility: CLINIC | Age: 53
Discharge: HOME | End: 2018-02-12
Attending: INTERNAL MEDICINE | Admitting: INTERNAL MEDICINE
Payer: COMMERCIAL

## 2018-02-12 VITALS
DIASTOLIC BLOOD PRESSURE: 78 MMHG | OXYGEN SATURATION: 98 % | SYSTOLIC BLOOD PRESSURE: 143 MMHG | RESPIRATION RATE: 18 BRPM | TEMPERATURE: 98.8 F

## 2018-02-12 DIAGNOSIS — B99.9 INTRA-ABDOMINAL INFECTION: ICD-10-CM

## 2018-02-12 DIAGNOSIS — N18.30 CKD (CHRONIC KIDNEY DISEASE) STAGE 3, GFR 30-59 ML/MIN (H): ICD-10-CM

## 2018-02-12 DIAGNOSIS — Z93.2 ILEOSTOMY STATUS (H): ICD-10-CM

## 2018-02-12 DIAGNOSIS — R80.9 PROTEINURIA: ICD-10-CM

## 2018-02-12 DIAGNOSIS — Z90.49: ICD-10-CM

## 2018-02-12 DIAGNOSIS — K66.8 INTRA-ABDOMINAL FREE AIR OF UNKNOWN ETIOLOGY: ICD-10-CM

## 2018-02-12 DIAGNOSIS — N18.30 ANEMIA IN STAGE 3 CHRONIC KIDNEY DISEASE (H): ICD-10-CM

## 2018-02-12 DIAGNOSIS — D63.1 ANEMIA IN STAGE 3 CHRONIC KIDNEY DISEASE (H): ICD-10-CM

## 2018-02-12 DIAGNOSIS — M19.90 OSTEOARTHRITIS: ICD-10-CM

## 2018-02-12 DIAGNOSIS — Z94.4 LIVER REPLACED BY TRANSPLANT (H): ICD-10-CM

## 2018-02-12 DIAGNOSIS — Z94.4 LIVER TRANSPLANT RECIPIENT (H): ICD-10-CM

## 2018-02-12 LAB
ALBUMIN SERPL-MCNC: 2.6 G/DL (ref 3.4–5)
ALP SERPL-CCNC: 155 U/L (ref 40–150)
ALT SERPL W P-5'-P-CCNC: 18 U/L (ref 0–70)
ANION GAP SERPL CALCULATED.3IONS-SCNC: 4 MMOL/L (ref 3–14)
AST SERPL W P-5'-P-CCNC: 18 U/L (ref 0–45)
BASOPHILS # BLD AUTO: 0 10E9/L (ref 0–0.2)
BASOPHILS NFR BLD AUTO: 0 %
BILIRUB DIRECT SERPL-MCNC: 0.1 MG/DL (ref 0–0.2)
BILIRUB SERPL-MCNC: 0.5 MG/DL (ref 0.2–1.3)
BUN SERPL-MCNC: 18 MG/DL (ref 7–30)
CALCIUM SERPL-MCNC: 8.2 MG/DL (ref 8.5–10.1)
CHLORIDE SERPL-SCNC: 107 MMOL/L (ref 94–109)
CO2 SERPL-SCNC: 30 MMOL/L (ref 20–32)
CREAT SERPL-MCNC: 1.24 MG/DL (ref 0.66–1.25)
DIFFERENTIAL METHOD BLD: ABNORMAL
EOSINOPHIL # BLD AUTO: 0 10E9/L (ref 0–0.7)
EOSINOPHIL NFR BLD AUTO: 0.7 %
ERYTHROCYTE [DISTWIDTH] IN BLOOD BY AUTOMATED COUNT: 15.1 % (ref 10–15)
ERYTHROCYTE [DISTWIDTH] IN BLOOD BY AUTOMATED COUNT: 15.3 % (ref 10–15)
GFR SERPL CREATININE-BSD FRML MDRD: 61 ML/MIN/1.7M2
GLUCOSE SERPL-MCNC: 222 MG/DL (ref 70–99)
HCT VFR BLD AUTO: 24.8 % (ref 40–53)
HCT VFR BLD AUTO: 25.5 % (ref 40–53)
HGB BLD-MCNC: 7.9 G/DL (ref 13.3–17.7)
HGB BLD-MCNC: 8.2 G/DL (ref 13.3–17.7)
IMM GRANULOCYTES # BLD: 0 10E9/L (ref 0–0.4)
IMM GRANULOCYTES NFR BLD: 0 %
LACTATE SERPL-SCNC: 1.7 MMOL/L (ref 0.4–2)
LYMPHOCYTES # BLD AUTO: 1.3 10E9/L (ref 0.8–5.3)
LYMPHOCYTES NFR BLD AUTO: 25 %
MAGNESIUM SERPL-MCNC: 2.1 MG/DL (ref 1.6–2.3)
MCH RBC QN AUTO: 29.2 PG (ref 26.5–33)
MCH RBC QN AUTO: 29.7 PG (ref 26.5–33)
MCHC RBC AUTO-ENTMCNC: 31.9 G/DL (ref 31.5–36.5)
MCHC RBC AUTO-ENTMCNC: 32.2 G/DL (ref 31.5–36.5)
MCV RBC AUTO: 92 FL (ref 78–100)
MCV RBC AUTO: 92 FL (ref 78–100)
MONOCYTES # BLD AUTO: 0.4 10E9/L (ref 0–1.3)
MONOCYTES NFR BLD AUTO: 7.1 %
NEUTROPHILS # BLD AUTO: 3.6 10E9/L (ref 1.6–8.3)
NEUTROPHILS NFR BLD AUTO: 67.2 %
NRBC # BLD AUTO: 0 10*3/UL
NRBC BLD AUTO-RTO: 0 /100
PHOSPHATE SERPL-MCNC: 3 MG/DL (ref 2.5–4.5)
PLATELET # BLD AUTO: 102 10E9/L (ref 150–450)
PLATELET # BLD AUTO: 119 10E9/L (ref 150–450)
POTASSIUM SERPL-SCNC: 3.8 MMOL/L (ref 3.4–5.3)
PROT SERPL-MCNC: 6.8 G/DL (ref 6.8–8.8)
RBC # BLD AUTO: 2.71 10E12/L (ref 4.4–5.9)
RBC # BLD AUTO: 2.76 10E12/L (ref 4.4–5.9)
SODIUM SERPL-SCNC: 141 MMOL/L (ref 133–144)
TACROLIMUS BLD-MCNC: <3 UG/L (ref 5–15)
TME LAST DOSE: ABNORMAL H
WBC # BLD AUTO: 5.4 10E9/L (ref 4–11)
WBC # BLD AUTO: 5.4 10E9/L (ref 4–11)

## 2018-02-12 PROCEDURE — 99285 EMERGENCY DEPT VISIT HI MDM: CPT | Mod: 25

## 2018-02-12 PROCEDURE — 83735 ASSAY OF MAGNESIUM: CPT

## 2018-02-12 PROCEDURE — 83605 ASSAY OF LACTIC ACID: CPT | Performed by: EMERGENCY MEDICINE

## 2018-02-12 PROCEDURE — 99285 EMERGENCY DEPT VISIT HI MDM: CPT | Mod: 25 | Performed by: EMERGENCY MEDICINE

## 2018-02-12 PROCEDURE — 96375 TX/PRO/DX INJ NEW DRUG ADDON: CPT | Performed by: EMERGENCY MEDICINE

## 2018-02-12 PROCEDURE — 99285 EMERGENCY DEPT VISIT HI MDM: CPT | Mod: Z6 | Performed by: EMERGENCY MEDICINE

## 2018-02-12 PROCEDURE — 80197 ASSAY OF TACROLIMUS: CPT | Performed by: INTERNAL MEDICINE

## 2018-02-12 PROCEDURE — 83883 ASSAY NEPHELOMETRY NOT SPEC: CPT

## 2018-02-12 PROCEDURE — 84460 ALANINE AMINO (ALT) (SGPT): CPT

## 2018-02-12 PROCEDURE — 25000128 H RX IP 250 OP 636: Performed by: EMERGENCY MEDICINE

## 2018-02-12 PROCEDURE — 99223 1ST HOSP IP/OBS HIGH 75: CPT | Mod: AI | Performed by: INTERNAL MEDICINE

## 2018-02-12 PROCEDURE — 82784 ASSAY IGA/IGD/IGG/IGM EACH: CPT

## 2018-02-12 PROCEDURE — 99207 ZZC APP CREDIT; MD BILLING SHARED VISIT: CPT | Performed by: PHYSICIAN ASSISTANT

## 2018-02-12 PROCEDURE — 96365 THER/PROPH/DIAG IV INF INIT: CPT | Performed by: EMERGENCY MEDICINE

## 2018-02-12 PROCEDURE — 96366 THER/PROPH/DIAG IV INF ADDON: CPT | Performed by: EMERGENCY MEDICINE

## 2018-02-12 PROCEDURE — 25000132 ZZH RX MED GY IP 250 OP 250 PS 637: Performed by: EMERGENCY MEDICINE

## 2018-02-12 PROCEDURE — 84450 TRANSFERASE (AST) (SGOT): CPT

## 2018-02-12 PROCEDURE — 00000402 ZZHCL STATISTIC TOTAL PROTEIN

## 2018-02-12 PROCEDURE — 84155 ASSAY OF PROTEIN SERUM: CPT

## 2018-02-12 PROCEDURE — 80069 RENAL FUNCTION PANEL: CPT

## 2018-02-12 PROCEDURE — 85025 COMPLETE CBC W/AUTO DIFF WBC: CPT | Performed by: EMERGENCY MEDICINE

## 2018-02-12 PROCEDURE — 82248 BILIRUBIN DIRECT: CPT

## 2018-02-12 PROCEDURE — 85027 COMPLETE CBC AUTOMATED: CPT

## 2018-02-12 PROCEDURE — 36415 COLL VENOUS BLD VENIPUNCTURE: CPT

## 2018-02-12 PROCEDURE — 82247 BILIRUBIN TOTAL: CPT

## 2018-02-12 PROCEDURE — 74177 CT ABD & PELVIS W/CONTRAST: CPT

## 2018-02-12 PROCEDURE — 80076 HEPATIC FUNCTION PANEL: CPT

## 2018-02-12 PROCEDURE — 96365 THER/PROPH/DIAG IV INF INIT: CPT | Mod: 59

## 2018-02-12 PROCEDURE — 84165 PROTEIN E-PHORESIS SERUM: CPT

## 2018-02-12 PROCEDURE — 25000128 H RX IP 250 OP 636: Performed by: PHYSICIAN ASSISTANT

## 2018-02-12 PROCEDURE — 86334 IMMUNOFIX E-PHORESIS SERUM: CPT

## 2018-02-12 PROCEDURE — 96376 TX/PRO/DX INJ SAME DRUG ADON: CPT | Performed by: EMERGENCY MEDICINE

## 2018-02-12 PROCEDURE — 84075 ASSAY ALKALINE PHOSPHATASE: CPT

## 2018-02-12 PROCEDURE — 25000131 ZZH RX MED GY IP 250 OP 636 PS 637: Performed by: PHYSICIAN ASSISTANT

## 2018-02-12 RX ORDER — ERTAPENEM 1 G/1
1 INJECTION, POWDER, LYOPHILIZED, FOR SOLUTION INTRAMUSCULAR; INTRAVENOUS ONCE
Status: COMPLETED | OUTPATIENT
Start: 2018-02-12 | End: 2018-02-12

## 2018-02-12 RX ORDER — MYCOPHENOLIC ACID 360 MG/1
720 TABLET, DELAYED RELEASE ORAL EVERY 12 HOURS
Status: DISCONTINUED | OUTPATIENT
Start: 2018-02-12 | End: 2018-02-13

## 2018-02-12 RX ORDER — HYDROMORPHONE HYDROCHLORIDE 1 MG/ML
.3-.5 INJECTION, SOLUTION INTRAMUSCULAR; INTRAVENOUS; SUBCUTANEOUS
Status: DISCONTINUED | OUTPATIENT
Start: 2018-02-12 | End: 2018-02-13

## 2018-02-12 RX ORDER — GABAPENTIN 300 MG/1
300 CAPSULE ORAL 3 TIMES DAILY
Status: DISCONTINUED | OUTPATIENT
Start: 2018-02-13 | End: 2018-02-15 | Stop reason: HOSPADM

## 2018-02-12 RX ORDER — IOPAMIDOL 755 MG/ML
500 INJECTION, SOLUTION INTRAVASCULAR ONCE
Status: COMPLETED | OUTPATIENT
Start: 2018-02-12 | End: 2018-02-12

## 2018-02-12 RX ORDER — TACROLIMUS 1 MG/1
1 CAPSULE ORAL EVERY 12 HOURS
Status: DISCONTINUED | OUTPATIENT
Start: 2018-02-12 | End: 2018-02-13

## 2018-02-12 RX ORDER — SODIUM BICARBONATE 650 MG/1
650 TABLET ORAL 3 TIMES DAILY
Status: DISCONTINUED | OUTPATIENT
Start: 2018-02-13 | End: 2018-02-15 | Stop reason: HOSPADM

## 2018-02-12 RX ORDER — SODIUM CHLORIDE 9 MG/ML
INJECTION, SOLUTION INTRAVENOUS CONTINUOUS
Status: DISCONTINUED | OUTPATIENT
Start: 2018-02-12 | End: 2018-02-13

## 2018-02-12 RX ORDER — HYDROCODONE BITARTRATE AND ACETAMINOPHEN 5; 325 MG/1; MG/1
1 TABLET ORAL ONCE
Status: COMPLETED | OUTPATIENT
Start: 2018-02-12 | End: 2018-02-12

## 2018-02-12 RX ORDER — HYDROMORPHONE HYDROCHLORIDE 1 MG/ML
0.5 INJECTION, SOLUTION INTRAMUSCULAR; INTRAVENOUS; SUBCUTANEOUS ONCE
Status: COMPLETED | OUTPATIENT
Start: 2018-02-12 | End: 2018-02-12

## 2018-02-12 RX ADMIN — ERTAPENEM SODIUM 1 G: 1 INJECTION, POWDER, LYOPHILIZED, FOR SOLUTION INTRAMUSCULAR; INTRAVENOUS at 19:41

## 2018-02-12 RX ADMIN — IOPAMIDOL 100 ML: 755 INJECTION, SOLUTION INTRAVENOUS at 14:42

## 2018-02-12 RX ADMIN — TACROLIMUS 1 MG: 1 CAPSULE ORAL at 23:24

## 2018-02-12 RX ADMIN — Medication 0.5 MG: at 22:26

## 2018-02-12 RX ADMIN — TAZOBACTAM SODIUM AND PIPERACILLIN SODIUM 4.5 G: 500; 4 INJECTION, SOLUTION INTRAVENOUS at 15:43

## 2018-02-12 RX ADMIN — HYDROCODONE BITARTRATE AND ACETAMINOPHEN 1 TABLET: 5; 325 TABLET ORAL at 14:29

## 2018-02-12 RX ADMIN — MYCOPHENOLIC ACID 720 MG: 360 TABLET, DELAYED RELEASE ORAL at 23:24

## 2018-02-12 RX ADMIN — SODIUM CHLORIDE 65 ML: 9 INJECTION, SOLUTION INTRAVENOUS at 14:42

## 2018-02-12 RX ADMIN — SODIUM CHLORIDE: 9 INJECTION, SOLUTION INTRAVENOUS at 22:24

## 2018-02-12 RX ADMIN — Medication 0.5 MG: at 19:00

## 2018-02-12 ASSESSMENT — ENCOUNTER SYMPTOMS
BLOOD IN STOOL: 0
NAUSEA: 0
DIAPHORESIS: 0
SHORTNESS OF BREATH: 0
ABDOMINAL PAIN: 1
DIFFICULTY URINATING: 0
FREQUENCY: 0
APPETITE CHANGE: 0
HEMATURIA: 0
SORE THROAT: 0
VOMITING: 0
FEVER: 0
FEVER: 0
DYSURIA: 0
CHILLS: 0
ABDOMINAL DISTENTION: 1
COUGH: 0
RHINORRHEA: 0
ABDOMINAL PAIN: 1
DIARRHEA: 0
ABDOMINAL DISTENTION: 1
CONSTIPATION: 0

## 2018-02-12 NOTE — ED PROVIDER NOTES
History     Chief Complaint:  Abdominal distention    ROSA MARIA Bhagat is a 52 year old male with a history of recent liver transplant(3/2017) and colon infection(7/2017), ostomy takedown (1/2018) who presents with abdominal distention. The patient reports that he began noticing some fluid collection and abdominal distention over the area of previous drainage tube site beginning two days ago. This area of distension has been increasing since that point and has become increasingly painful. He denies having had any fevers during this time. The patient denies having any history of similar fluid collection.    Of note the patient had lab work previously today prior to coming into the emergency department.   Bilirubin direct: 0.1  Magnesium: 2.1  Protein total: 6.8  Renal panel:    Potassium: 3.8   Chloride: 107   Carbon dioxide: 30   Anion Gap: 4   Glucose: 222(H)   Urea nitrogen: 18   Creatinine: 1.24   GFR: 61   Calcium: 8.2 (L)   Phosphorus: 3.0   Albumin: 2.6(L)  Alk phos: 155(H)  ALT: 18  AST: 18  Bilirubin total 0.5  CBC:   WBC: 5.4   RBC: 2.76(L)   Hemoglobin: 8.2(L)   Hematocrit: 25.5(L)   MCV: 92   MCH: 29.7   MCHC: 32.2   RDW: 15.1(H)   Platelet count: 119(L)  Tacrolimus level: pending  Kappa and lambda light chain: Pending  Protein electrophoresis: pending  Protein immunofixation serum: pending    Allergies:  Erythromycin  Vioxx    Medications:    Patiromer  Florinef  Prednisone  Lasix  Mycophenolic acid  Tacrolimus  Amlodipine  Cialis  Omeprazole  Gabapentin  Lantus  Linagliptin  Flexeril    Past Medical History:    Ileostomy in place  Peristomal skin complication  Pancytopenia  Ischemia of both lower extremities  Liver transplant  Neutropenic colitis  Hyperkalemia  Hepatocellular carcinoma  Osteoarthritis  Rheumatoid arthritis  Fibromyalgia  Sicca syndrome  Testicular hypofunction  Carpal tunnel syndrome  Depressive disorder  GERD  Gangrene of finger  DVT  Nonalcoholic  steatohepatitis  Pneumonia  Hypertension  RONNIE  Hyperlipidemia    Past Surgical History:    Bench liver  Total knee arthroplasty right  Cholecystectomy  Colonoscopy  ENT Surgery  EGD x 2  Laparotomy exploratory x 3  Repair dislocated wrist  Release trigger finger  Tracheostomy  Takedown ileostomy  Transplant liver recipient  donor    Family History:    Diabetes  Hypertension  Substance abuse  Arthritis   Thyroid cancer    Social History:  Smoking Status: Former 0.75 PPD  Smokeless Tobacco: Former  Alcohol Use: No   Marital Status:   [2]    Review of Systems   Constitutional: Negative for fever.   Gastrointestinal: Positive for abdominal distention and abdominal pain.   All other systems reviewed and are negative.    Physical Exam   Vitals:  Patient Vitals for the past 24 hrs:   BP Temp Temp src Heart Rate Resp SpO2   18 1616 - - - - - 98 %   18 1615 - - - - - 97 %   18 1600 143/78 - - - - 96 %   18 1550 - - - - - 98 %   18 1542 148/81 - - - - 96 %   18 1430 137/75 - - - - 100 %   18 1427 - - - - - 99 %   18 1400 138/75 - - - - 98 %   18 1316 (!) 165/101 98.8  F (37.1  C) Oral 94 18 100 %        Physical Exam  Nursing note and vitals reviewed.  Constitutional: Cooperative.   HENT:   Mouth/Throat: Moist mucous membranes.   Eyes: EOMI, nonicteric sclera  Cardiovascular: Normal rate, regular rhythm, no murmurs, rubs, or gallops  Pulmonary/Chest: Effort normal and breath sounds normal. No respiratory distress. No wheezes. No rales.   Abdominal: Soft. TTP RLQ, soft tissue swelling/induration, palpable in pannus - approximately golf ball sized, nondistended, no guarding or rigidity. BS present.   Musculoskeletal: Normal range of motion.   Neurological: Alert. Moves all extremities spontaneously.   Skin: Skin is warm and dry. No rash noted.   Psychiatric: Normal mood and affect.       Emergency Department Course     Imaging:  Radiology findings were  communicated with the patient who voiced understanding of the findings.  CT abdomen pelvis w contrast:  IMPRESSION:  1. Free intraperitoneal air with fluid and air collection in the right  retroperitoneum concerning for developing infectious process. Discrete  abscess is not currently evident. Findings are concerning for bowel  leak in the absence of a recent intra-abdominal procedure. Significant  abdominal wall swelling and small amount of ascites is present.  2. Trace right pleural effusion. Bibasilar atelectasis is present.  3. Prior cholecystectomy changes. Hepatosplenomegaly is noted. Patient  is status post liver transplant procedure.  Reading per radiology.     Laboratory:  Laboratory findings were communicated with the patient who voiced understanding of the findings.  Lactic acid(1335): 1.7    Interventions:  1429 Norco 5-325 mg 1 tablet oral  1613 Zosyn 4.5 g IV    Emergency Department Course:  Nursing notes and vitals reviewed.  I performed an exam of the patient as documented above.   IV was inserted and blood was drawn for laboratory testing, results above.  The patient was sent for a CT while in the emergency department, results above.      1527 I spoke with Dr. Dinh regarding the patient    1539 I rechecked with the patient    1541 I spoke with Dr. Arguello from [facility] Emergency Department    1559 I rechecked with the patient    The patient was transferred to [facility]. All questions answered prior to discharge.    Impression & Plan      Medical Decision Making:  Camacho Bhagat is a 52 year old male who presents to the emergency department today with chief complaint of right sided abdominal pain with questionable mass.On exam, I am able to palpate some induration and what feels like a mass in the soft tissue of the patient's right abdomen. A scar overlies this and this may be consistent with an old TABITHA drain, though it is uncertain and patient does not  remember. CT of the abdomen/pelvis was obtained which shows some free air and liquid in the patient's abdomen, which is not quite an abscess but concerning for developing abscess. I will start Zosyn for coverage. I spoke with the [facility] Colorectal Surgeon, Dr Dinh, who suggested we transfer the patient to the [place] [facility] Department where they would consult and try to determine the best place for the patient to go whether that is Transplant, Colorectal, or General Surgery. I spoke to Dr. Arguello of the emergency department who accepts. All patient's questions are answered and he is in agreement with the plan. he is transferred in stable condition. He is going by a private car, wife will take him.     Diagnosis:    ICD-10-CM    1. Intra-abdominal free air of unknown etiology K66.8         Disposition:   Transferred to [facility] Diagnoses: None     Scribe Disclosure:  I, Jermain Carine, am serving as a scribe at 1:39 PM on 2/12/2018 to document services personally performed by Quincy Estrada MD, based on my observations and the provider's statements to me.   [facility] EMERGENCY DEPARTMENT       Quincy Estrada MD  02/13/18 2001

## 2018-02-12 NOTE — TELEPHONE ENCOUNTER
Patient Call: Transplant   Duration Swelling- Noticed this weekend- seems to be larger today  Illness: Stated has an area on Right side- some area hard to touch- no redness- some pain if presses hard

## 2018-02-12 NOTE — LETTER
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]  Abdominal abscess (H) [K65.1]  Intra-abdominal infection [B99.9]  Admit Date/Time: 2/12/2018  6:00 PM  Discharge Date: 2/15/2018  Payor Source: Payor: BCBS / Plan: BCBS OUT OF STATE / Product Type: Indemnity /     Readmission Assessment Measure (MAIDA) Risk Score/category: High         Reason for Communication Hand-off Referral: Other Continuity of care    Discharge Plan: discharge to home with wound care and follow-up       Concern for non-adherence with plan of care:   No    Already enrolled in Tele-monitoring program and name of program:  NA  Follow-up specialty is recommended: Yes    Follow-up plan:  Future Appointments  Date Time Provider Department Center   2/28/2018 1:00 PM Cinda Cazares NP [facility]   3/2/2018 10:45 AM Toñito Camejo MD [facility]       Any outstanding tests or procedures:        Referrals     Future Labs/Procedures    Medication Therapy Management Referral     Comments:    Reason for referral:  on more than 10 medications and hospitalized or in the ED in the past 6 months     This service is designed to help you get the most from your medications.  A specially trained pharmacist will work closely with you and your doctors  to solve any problems related to your medications and to help you get the   best results from taking them.      The Medication Therapy Management staff will call you to schedule an appointment.          Payton Ferrer  RN Care Coordinator   [phone]    AVS/Discharge Summary is the source of truth; this is a helpful guide for improved communication of patient story

## 2018-02-12 NOTE — IP AVS SNAPSHOT
[address] [address]    Phone:  [phone]                                       After Visit Summary   2/12/2018    Camacho Bhagat    MRN: [id number]           After Visit Summary Signature Page     I have received my discharge instructions, and my questions have been answered. I have discussed any challenges I see with this plan with the nurse or doctor.    ..........................................................................................................................................  Patient/Patient Representative Signature      ..........................................................................................................................................  Patient Representative Print Name and Relationship to Patient    ..................................................               ................................................  Date                                            Time    ..........................................................................................................................................  Reviewed by Signature/Title    ...................................................              ..............................................  Date                                                            Time

## 2018-02-12 NOTE — ED NOTES
Patient presents after liver transplant in 3/2017. Patient also had colon infection in 7/2017. Patient just had the takedown on 1/5/2018. Since Saturday, patient started to develop a fluid mass on the right side of his abdomen. Patient states pain and mass have gotten bigger since then. Pain is 10/10. ABCDs intact, alert and oriented x 4.

## 2018-02-12 NOTE — ED NOTES
Triage Assessment & Note:    /78  Pulse 88  Temp 100.2  F (37.9  C) (Oral)  Resp 18  Ht 1.829 m (6')  Wt 90.7 kg (200 lb)  SpO2 96%  BMI 27.12 kg/m2    Patient presents with: c/o fluid build up on abdomen, was transferred down from [facility] ED.     Home Treatments/Remedies: none    Febrile / Afebrile? Afebrile    Duration of C/o:     Allen Garrett  February 12, 2018

## 2018-02-12 NOTE — IP AVS SNAPSHOT
MRN:[id number]                      After Visit Summary   2/12/2018    Camacho Bhagat    MRN: [id number]           Thank you!     Thank you for choosing [facility] for your care. Our goal is always to provide you with excellent care. Hearing back from our patients is one way we can continue to improve our services. Please take a few minutes to complete the written survey that you may receive in the mail after you visit with us. Thank you!        Patient Information     Date Of Birth          1965        Designated Caregiver       Most Recent Value    Caregiver    Will someone help with your care after discharge? yes    Name of designated caregiver wife Danica     Phone number of caregiver [phone]    Caregiver address same as pt       About your hospital stay     You were admitted on:  February 13, 2018 You last received care in the:  [address] [facility]    You were discharged on:  February 15, 2018        Reason for your hospital stay       You were admitted with abdominal abscess and this was drained by surgery ; please contact us immediately if you have any fever, chills or bleeding from that area  You will need to contact surgery in 2 weeks                  Who to Call     For medical emergencies, please call 911.  For non-urgent questions about your medical care, please call your primary care provider or clinic, [phone]  For questions related to your surgery, please call your surgery clinic        Attending Provider     Provider Specialty    Monserrat Munoz MD Emergency Medicine    ArcherKrishna argueta MD Internal Medicine    Paulo Hunter MD Internal Medicine       Primary Care Provider Office Phone # Fax #    Shay Kirkpatrick [phone]      After Care Instructions     Activity       Your activity upon discharge: activity as tolerated            Diet       Follow this diet upon discharge: Orders Placed This Encounter      Room Service      Regular Diet  Adult                  Your next 10 appointments already scheduled     Feb 28, 2018  1:00 PM CST   (Arrive by 12:30 PM)   Return Visit with Cinda Cazares NP   [facility] ([facility])    [address]  [address]  [place] [id number] [phone]            Mar 02, 2018 10:45 AM CST   (Arrive by 10:30 AM)   Return Liver Transplant with Toñito Camejo MD   [facility] ([facility])    [address]  [address]  [place] [id number] [phone]              Additional Services     Medication Therapy Management Referral       Reason for referral:  on more than 10 medications and hospitalized or in the ED in the past 6 months     This service is designed to help you get the most from your medications.  A specially trained pharmacist will work closely with you and your doctors  to solve any problems related to your medications and to help you get the   best results from taking them.      The Medication Therapy Management staff will call you to schedule an appointment.                  Pending Results     Date and Time Order Name Status Description    2/14/2018 1315 Fungus Culture, non-blood Preliminary     2/14/2018 1315 Anaerobic bacterial culture Preliminary     2/14/2018 1315 Abscess Culture Aerobic Bacterial Preliminary     2/13/2018 0809 Blood culture Preliminary     2/13/2018 0809 Blood culture Preliminary             Statement of Approval     Ordered          02/15/18 1028  I have reviewed and agree with all the recommendations and orders detailed in this document.  EFFECTIVE NOW     Approved and electronically signed by:  Paulo Hunter MD             Admission Information     Date & Time Provider Department Dept. Phone    2/12/2018 Paulo Hunter MD [address] [facility] [phone]      Your Vitals Were     Blood Pressure Pulse Temperature Respirations Height Weight    151/82 (BP Location: Right arm) 98  98.6  F (37  C) (Oral) 16 1.829 m (6') 91.8 kg (202 lb 6.4 oz)    Pulse Oximetry BMI (Body Mass Index)                94% 27.45 kg/m2          SuperSecret Information     [facility]Secret gives you secure access to your electronic health record. If you see a primary care provider, you can also send messages to your care team and make appointments. If you have questions, please call your primary care clinic.  If you do not have a primary care provider, please call [phone] and they will assist you.        Care EveryWhere ID     This is your Care EveryWhere ID. This could be used by other organizations to access your [place] medical records  [id number]        Equal Access to Services     FRANKLIN PORTILLO : Todd Carlisle, troy gaston, danielle dolan, devi duckworth. So [facility] [phone].    ATENCIÓN: Si habla español, tiene a zheng disposición servicios gratuitos de asistencia lingüística. Llame al [phone].    We comply with applicable federal civil rights laws and [place] laws. We do not discriminate on the basis of race, color, national origin, age, disability, sex, sexual orientation, or gender identity.               Review of your medicines      START taking        Dose / Directions    amoxicillin-clavulanate 875-125 MG per tablet   Commonly known as:  AUGMENTIN   Indication:  Abscess   Used for:  Abdominal abscess (H)        Dose:  1 tablet   Take 1 tablet by mouth every 12 hours for 13 days   Quantity:  26 tablet   Refills:  0         CONTINUE these medicines which may have CHANGED, or have new prescriptions. If we are uncertain of the size of tablets/capsules you have at home, strength may be listed as something that might have changed.        Dose / Directions    amLODIPine 10 MG tablet   Commonly known as:  NORVASC   This may have changed:  when to take this   Used for:  HTN (hypertension)        Dose:  10 mg   Take 1 tablet (10 mg) by mouth daily    Quantity:  90 tablet   Refills:  3       oxyCODONE IR 10 MG tablet   Commonly known as:  ROXICODONE   This may have changed:    - how much to take  - when to take this   Used for:  Intra-abdominal infection, Abdominal abscess (H)        Dose:  5 mg   Take 0.5 tablets (5 mg) by mouth every 6 hours as needed for moderate to severe pain   Quantity:  15 tablet   Refills:  0         CONTINUE these medicines which have NOT CHANGED        Dose / Directions    cholecalciferol 1000 UNIT tablet   Commonly known as:  vitamin D3   Used for:  Vitamin D deficiency        Dose:  1000 Units   Take 1 tablet (1,000 Units) by mouth daily   Quantity:  100 tablet   Refills:  3       CIALIS 5 MG tablet   Generic drug:  tadalafil        Dose:  5 mg   Take 5 mg by mouth as needed   Refills:  1       cyclobenzaprine 10 MG tablet   Commonly known as:  FLEXERIL   Used for:  Immunosuppression (H)        Dose:  10 mg   Take 1 tablet (10 mg) by mouth 3 times daily as needed for muscle spasms   Quantity:  90 tablet   Refills:  0       fludrocortisone 0.1 MG tablet   Commonly known as:  FLORINEF   Used for:  Hyperkalemia        Dose:  0.1 mg   Take 1 tablet (0.1 mg) by mouth daily   Quantity:  90 tablet   Refills:  3       furosemide 20 MG tablet   Commonly known as:  LASIX   Used for:  Hyperkalemia        Dose:  40 mg   Take 2 tablets (40 mg) by mouth 2 times daily   Quantity:  200 tablet   Refills:  3       GABAPENTIN PO        Dose:  300 mg   Take 300 mg by mouth 3 times daily   Refills:  0       insulin glargine 100 UNIT/ML injection   Commonly known as:  LANTUS        Dose:  50 Units   Inject 50 Units Subcutaneous every morning   Refills:  0       linagliptin 5 MG Tabs tablet   Commonly known as:  TRADJENTA        Dose:  5 mg   Take 5 mg by mouth daily   Refills:  0       magnesium oxide 400 (241.3 MG) MG tablet   Commonly known as:  MAG-OX   Used for:  Hypomagnesemia, CKD (chronic kidney disease) stage 3, GFR 30-59 ml/min        Dose:  400  mg   Take 1 tablet (400 mg) by mouth daily   Quantity:  30 tablet   Refills:  0       mycophenolic acid 360 MG EC tablet   Commonly known as:  GENERIC EQUIVALENT   Used for:  History of liver transplant (H), Long-term use of immunosuppressant medication        Dose:  720 mg   Take 2 tablets (720 mg) by mouth every 12 hours   Quantity:  120 tablet   Refills:  3       OMEPRAZOLE PO        Dose:  20 mg   Take 20 mg by mouth every morning   Refills:  0       polyethylene glycol powder   Commonly known as:  MIRALAX   Used for:  Ileostomy in place (H)        Dose:  1 capful   Take 17 g (1 capful) by mouth daily   Quantity:  510 g   Refills:  1       predniSONE 2.5 MG tablet   Commonly known as:  DELTASONE   Used for:  Liver replaced by transplant (H), Long-term use of immunosuppressant medication        Dose:  2.5 mg   Take 1 tablet (2.5 mg) by mouth daily   Quantity:  90 tablet   Refills:  3       sodium bicarbonate 650 MG tablet   Used for:  Hyperkalemia        Dose:  650 mg   Take 1 tablet (650 mg) by mouth 3 times daily   Quantity:  180 tablet   Refills:  3       tacrolimus 1 MG capsule   Commonly known as:  GENERIC EQUIVALENT   Used for:  Liver transplant recipient (H)        Dose:  1 mg   Take 1 capsule (1 mg) by mouth every 12 hours   Quantity:  60 capsule   Refills:  11       VELTASSA 8.4 G packet   Used for:  Hyperkalemia   Generic drug:  patiromer        Dose:  8.4 g   Take 8.4 g by mouth daily   Quantity:  30 each   Refills:  3            Where to get your medicines      These medications were sent to [facility] [place], [place] - [address] [address]     Phone:  [phone]     amoxicillin-clavulanate 875-125 MG per tablet    tacrolimus 1 MG capsule         Some of these will need a paper prescription and others can be bought over the counter. Ask your nurse if you have questions.     Bring a paper prescription for each of these medications      oxyCODONE IR 10 MG tablet                Protect others around you: Learn how to safely use, store and throw away your medicines at www.disposemymeds.org.        ANTIBIOTIC INSTRUCTION     You've Been Prescribed an Antibiotic - Now What?  Your healthcare team thinks that you or your loved one might have an infection. Some infections can be treated with antibiotics, which are powerful, life-saving drugs. Like all medications, antibiotics have side effects and should only be used when necessary. There are some important things you should know about your antibiotic treatment.      Your healthcare team may run tests before you start taking an antibiotic.    Your team may take samples (e.g., from your blood, urine or other areas) to run tests to look for bacteria. These test can be important to determine if you need an antibiotic at all and, if you do, which antibiotic will work best.      Within a few days, your healthcare team might change or even stop your antibiotic.    Your team may start you on an antibiotic while they are working to find out what is making you sick.    Your team might change your antibiotic because test results show that a different antibiotic would be better to treat your infection.    In some cases, once your team has more information, they learn that you do not need an antibiotic at all. They may find out that you don't have an infection, or that the antibiotic you're taking won't work against your infection. For example, an infection caused by a virus can't be treated with antibiotics. Staying on an antibiotic when you don't need it is more likely to be harmful than helpful.      You may experience side effects from your antibiotic.    Like all medications, antibiotics have side effects. Some of these can be serious.    Let you healthcare team know if you have any known allergies when you are admitted to the hospital.    One significant side effect of nearly all antibiotics is the risk of  severe and sometimes deadly diarrhea caused by Clostridium difficile (C. Difficile). This occurs when a person takes antibiotics because some good germs are destroyed. Antibiotic use allows C. diificile to take over, putting patients at high risk for this serious infection.    As a patient or caregiver, it is important to understand your or your loved one's antibiotic treatment. It is especially important for caregivers to speak up when patients can't speak for themselves. Here are some important questions to ask your healthcare team.    What infection is this antibiotic treating and how do you know I have that infection?    What side effects might occur from this antibiotic?    How long will I need to take this antibiotic?    Is it safe to take this antibiotic with other medications or supplements (e.g., vitamins) that I am taking?     Are there any special directions I need to know about taking this antibiotic? For example, should I take it with food?    How will I be monitored to know whether my infection is responding to the antibiotic?    What tests may help to make sure the right antibiotic is prescribed for me?      Information provided by:  www.cdc.gov/getsmart  U.S. Department of Health and Human Services  Centers for disease Control and Prevention  National Center for Emerging and Zoonotic Infectious Diseases  Division of Healthcare Quality Promotion        Information about OPIOIDS     PRESCRIPTION OPIOIDS: WHAT YOU NEED TO KNOW    Prescription opioids can be used to help relieve moderate to severe pain and are often prescribed following a surgery or injury, or for certain health conditions. These medications can be an important part of treatment but also come with serious risks. It is important to work with your health care provider to make sure you are getting the safest, most effective care.    WHAT ARE THE RISKS AND SIDE EFFECTS OF OPIOID USE?  Prescription opioids carry serious risks of addiction and  overdose, especially with prolonged use. An opioid overdose, often marked by slowed breathing can cause sudden death. The use of prescription opioids can have a number of side effects as well, even when taken as directed:      Tolerance - meaning you might need to take more of a medication for the same pain relief    Physical dependence - meaning you have symptoms of withdrawal when a medication is stopped    Increased sensitivity to pain    Constipation    Nausea, vomiting, and dry mouth    Sleepiness and dizziness    Confusion    Depression    Low levels of testosterone that can result in lower sex drive, energy, and strength    Itching and sweating    RISKS ARE GREATER WITH:    History of drug misuse, substance use disorder, or overdose    Mental health conditions (such as depression or anxiety)    Sleep apnea    Older age (65 years or older)    Pregnancy    Avoid alcohol while taking prescription opioids.   Also, unless specifically advised by your health care provider, medications to avoid include:    Benzodiazepines (such as Xanax or Valium)    Muscle relaxants (such as Soma or Flexeril)    Hypnotics (such as Ambien or Lunesta)    Other prescription opioids    KNOW YOUR OPTIONS:  Talk to your health care provider about ways to manage your pain that do not involve prescription opioids. Some of these options may actually work better and have fewer risks and side effects:    Pain relievers such as acetaminophen, ibuprofen, and naproxen    Some medications that are also used for depression or seizures    Physical therapy and exercise    Cognitive behavioral therapy, a psychological, goal-directed approach, in which patients learn how to modify physical, behavioral, and emotional triggers of pain and stress    IF YOU ARE PRESCRIBED OPIOIDS FOR PAIN:    Never take opioids in greater amounts or more often than prescribed    Follow up with your primary health care provider and work together to create a plan on how to  manage your pain.    Talk about ways to help manage your pain that do not involve prescription opioids    Talk about all concerns and side effects    Help prevent misuse and abuse    Never sell or share prescription opioids    Never use another person's prescription opioids    Store prescription opioids in a secure place and out of reach of others (this may include visitors, children, friends, and family)    Visit www.cdc.gov/drugoverdose to learn about risks of opioid abuse and overdose    If you believe you may be struggling with addiction, tell your health care provider and ask for guidance or call [facility]'s National Helpline at 2-795-886-HELP    LEARN MORE / www.cdc.gov/drugoverdose/prescribing/guideline.html    Safely dispose of unused prescription opioids: Find your local drug take-back programs and more information about the importance of safe disposal at www.doseofreality.[place].gov             Medication List: This is a list of all your medications and when to take them. Check marks below indicate your daily home schedule. Keep this list as a reference.      Medications           Morning Afternoon Evening Bedtime As Needed    amLODIPine 10 MG tablet   Commonly known as:  NORVASC   Take 1 tablet (10 mg) by mouth daily   Last time this was given:  10 mg on 2/15/2018  8:21 AM                                amoxicillin-clavulanate 875-125 MG per tablet   Commonly known as:  AUGMENTIN   Take 1 tablet by mouth every 12 hours for 13 days   Last time this was given:  1 tablet on 2/15/2018  8:21 AM                                cholecalciferol 1000 UNIT tablet   Commonly known as:  vitamin D3   Take 1 tablet (1,000 Units) by mouth daily   Last time this was given:  1,000 Units on 2/15/2018  8:23 AM                                CIALIS 5 MG tablet   Take 5 mg by mouth as needed   Generic drug:  tadalafil                                cyclobenzaprine 10 MG tablet   Commonly known as:  FLEXERIL   Take 1 tablet (10 mg) by  mouth 3 times daily as needed for muscle spasms                                fludrocortisone 0.1 MG tablet   Commonly known as:  FLORINEF   Take 1 tablet (0.1 mg) by mouth daily                                furosemide 20 MG tablet   Commonly known as:  LASIX   Take 2 tablets (40 mg) by mouth 2 times daily   Last time this was given:  40 mg on 2/15/2018  8:18 AM                                GABAPENTIN PO   Take 300 mg by mouth 3 times daily   Last time this was given:  300 mg on 2/15/2018  8:24 AM                                insulin glargine 100 UNIT/ML injection   Commonly known as:  LANTUS   Inject 50 Units Subcutaneous every morning   Last time this was given:  25 Units on 2/15/2018  8:26 AM                                linagliptin 5 MG Tabs tablet   Commonly known as:  TRADJENTA   Take 5 mg by mouth daily   Last time this was given:  5 mg on 2/15/2018  8:23 AM                                magnesium oxide 400 (241.3 MG) MG tablet   Commonly known as:  MAG-OX   Take 1 tablet (400 mg) by mouth daily   Last time this was given:  400 mg on 2/14/2018  8:28 PM                                mycophenolic acid 360 MG EC tablet   Commonly known as:  GENERIC EQUIVALENT   Take 2 tablets (720 mg) by mouth every 12 hours   Last time this was given:  720 mg on 2/15/2018  8:20 AM                                OMEPRAZOLE PO   Take 20 mg by mouth every morning   Last time this was given:  20 mg on 2/15/2018  8:22 AM                                oxyCODONE IR 10 MG tablet   Commonly known as:  ROXICODONE   Take 0.5 tablets (5 mg) by mouth every 6 hours as needed for moderate to severe pain   Last time this was given:  5 mg on 2/15/2018 11:34 AM                                polyethylene glycol powder   Commonly known as:  MIRALAX   Take 17 g (1 capful) by mouth daily                                predniSONE 2.5 MG tablet   Commonly known as:  DELTASONE   Take 1 tablet (2.5 mg) by mouth daily   Last time this was  given:  2.5 mg on 2/15/2018  8:22 AM                                sodium bicarbonate 650 MG tablet   Take 1 tablet (650 mg) by mouth 3 times daily   Last time this was given:  650 mg on 2/15/2018  8:20 AM                                tacrolimus 1 MG capsule   Commonly known as:  GENERIC EQUIVALENT   Take 1 capsule (1 mg) by mouth every 12 hours   Last time this was given:  2 mg on 2/15/2018  8:35 AM                                VELTASSA 8.4 G packet   Take 8.4 g by mouth daily   Generic drug:  patiromer

## 2018-02-12 NOTE — TELEPHONE ENCOUNTER
Spoke with Camacho, about swollen area noted above right hip bone, reddened and extending towards the sternum.  He doesn't have a rash or fever. The area is tender, and not located near any previous incisions.   Plan made with Camacho that he will have evaluated at the ER, will be seen at [facility], and will have labs done as well.

## 2018-02-13 ENCOUNTER — TELEPHONE (OUTPATIENT)
Dept: TRANSPLANT | Facility: CLINIC | Age: 53
End: 2018-02-13

## 2018-02-13 PROBLEM — K65.1 ABSCESS, INTRA-ABDOMINAL, POSTOPERATIVE (H): Status: ACTIVE | Noted: 2018-02-13

## 2018-02-13 PROBLEM — T81.43XA ABSCESS, INTRA-ABDOMINAL, POSTOPERATIVE (H): Status: ACTIVE | Noted: 2018-02-13

## 2018-02-13 LAB
ALBUMIN SERPL ELPH-MCNC: 2.8 G/DL (ref 3.7–5.1)
ALBUMIN SERPL-MCNC: 2.4 G/DL (ref 3.4–5)
ALP SERPL-CCNC: 170 U/L (ref 40–150)
ALPHA1 GLOB SERPL ELPH-MCNC: 0.7 G/DL (ref 0.2–0.4)
ALPHA2 GLOB SERPL ELPH-MCNC: 0.8 G/DL (ref 0.5–0.9)
ALT SERPL W P-5'-P-CCNC: 14 U/L (ref 0–70)
ANION GAP SERPL CALCULATED.3IONS-SCNC: 9 MMOL/L (ref 3–14)
AST SERPL W P-5'-P-CCNC: 18 U/L (ref 0–45)
B-GLOBULIN SERPL ELPH-MCNC: 0.8 G/DL (ref 0.6–1)
BILIRUB SERPL-MCNC: 0.4 MG/DL (ref 0.2–1.3)
BUN SERPL-MCNC: 16 MG/DL (ref 7–30)
CALCIUM SERPL-MCNC: 7.8 MG/DL (ref 8.5–10.1)
CHLORIDE SERPL-SCNC: 110 MMOL/L (ref 94–109)
CO2 SERPL-SCNC: 26 MMOL/L (ref 20–32)
CREAT SERPL-MCNC: 1.2 MG/DL (ref 0.66–1.25)
CRP SERPL-MCNC: 130 MG/L (ref 0–8)
ERYTHROCYTE [DISTWIDTH] IN BLOOD BY AUTOMATED COUNT: 15.3 % (ref 10–15)
GAMMA GLOB SERPL ELPH-MCNC: 1.1 G/DL (ref 0.7–1.6)
GFR SERPL CREATININE-BSD FRML MDRD: 63 ML/MIN/1.7M2
GLUCOSE BLDC GLUCOMTR-MCNC: 121 MG/DL (ref 70–99)
GLUCOSE BLDC GLUCOMTR-MCNC: 121 MG/DL (ref 70–99)
GLUCOSE BLDC GLUCOMTR-MCNC: 166 MG/DL (ref 70–99)
GLUCOSE BLDC GLUCOMTR-MCNC: 168 MG/DL (ref 70–99)
GLUCOSE SERPL-MCNC: 128 MG/DL (ref 70–99)
HCT VFR BLD AUTO: 23.2 % (ref 40–53)
HGB BLD-MCNC: 7.2 G/DL (ref 13.3–17.7)
IGA SERPL-MCNC: 139 MG/DL (ref 70–380)
IGG SERPL-MCNC: 1140 MG/DL (ref 695–1620)
IGM SERPL-MCNC: 36 MG/DL (ref 60–265)
INR PPP: 1.05 (ref 0.86–1.14)
KAPPA LC UR-MCNC: 2.01 MG/DL (ref 0.33–1.94)
KAPPA LC/LAMBDA SER: 0.73 {RATIO} (ref 0.26–1.65)
LAMBDA LC SERPL-MCNC: 2.76 MG/DL (ref 0.57–2.63)
M PROTEIN SERPL ELPH-MCNC: 0 G/DL
MCH RBC QN AUTO: 28.2 PG (ref 26.5–33)
MCHC RBC AUTO-ENTMCNC: 31 G/DL (ref 31.5–36.5)
MCV RBC AUTO: 91 FL (ref 78–100)
PLATELET # BLD AUTO: 93 10E9/L (ref 150–450)
POTASSIUM SERPL-SCNC: 3.3 MMOL/L (ref 3.4–5.3)
PROT PATTERN SERPL ELPH-IMP: ABNORMAL
PROT PATTERN SERPL IFE-IMP: ABNORMAL
PROT SERPL-MCNC: 6 G/DL (ref 6.8–8.8)
RBC # BLD AUTO: 2.55 10E12/L (ref 4.4–5.9)
SODIUM SERPL-SCNC: 144 MMOL/L (ref 133–144)
WBC # BLD AUTO: 5 10E9/L (ref 4–11)

## 2018-02-13 PROCEDURE — 80053 COMPREHEN METABOLIC PANEL: CPT | Performed by: PHYSICIAN ASSISTANT

## 2018-02-13 PROCEDURE — 96376 TX/PRO/DX INJ SAME DRUG ADON: CPT | Performed by: EMERGENCY MEDICINE

## 2018-02-13 PROCEDURE — 36415 COLL VENOUS BLD VENIPUNCTURE: CPT | Performed by: INTERNAL MEDICINE

## 2018-02-13 PROCEDURE — 25000132 ZZH RX MED GY IP 250 OP 250 PS 637: Performed by: INTERNAL MEDICINE

## 2018-02-13 PROCEDURE — 25000128 H RX IP 250 OP 636: Performed by: PHYSICIAN ASSISTANT

## 2018-02-13 PROCEDURE — 85610 PROTHROMBIN TIME: CPT | Performed by: PHYSICIAN ASSISTANT

## 2018-02-13 PROCEDURE — 25000132 ZZH RX MED GY IP 250 OP 250 PS 637: Performed by: PHYSICIAN ASSISTANT

## 2018-02-13 PROCEDURE — 99233 SBSQ HOSP IP/OBS HIGH 50: CPT | Performed by: INTERNAL MEDICINE

## 2018-02-13 PROCEDURE — 25000131 ZZH RX MED GY IP 250 OP 636 PS 637: Performed by: PHYSICIAN ASSISTANT

## 2018-02-13 PROCEDURE — 25000128 H RX IP 250 OP 636: Performed by: INTERNAL MEDICINE

## 2018-02-13 PROCEDURE — 12000008 ZZH R&B INTERMEDIATE UMMC

## 2018-02-13 PROCEDURE — 86140 C-REACTIVE PROTEIN: CPT | Performed by: PHYSICIAN ASSISTANT

## 2018-02-13 PROCEDURE — 00000146 ZZHCL STATISTIC GLUCOSE BY METER IP

## 2018-02-13 PROCEDURE — 85027 COMPLETE CBC AUTOMATED: CPT | Performed by: PHYSICIAN ASSISTANT

## 2018-02-13 PROCEDURE — 87040 BLOOD CULTURE FOR BACTERIA: CPT | Performed by: INTERNAL MEDICINE

## 2018-02-13 PROCEDURE — 25000131 ZZH RX MED GY IP 250 OP 636 PS 637: Performed by: NURSE PRACTITIONER

## 2018-02-13 RX ORDER — NICOTINE POLACRILEX 4 MG
15-30 LOZENGE BUCCAL
Status: DISCONTINUED | OUTPATIENT
Start: 2018-02-13 | End: 2018-02-15 | Stop reason: HOSPADM

## 2018-02-13 RX ORDER — MAGNESIUM OXIDE 400 MG/1
400 TABLET ORAL DAILY
Status: DISCONTINUED | OUTPATIENT
Start: 2018-02-13 | End: 2018-02-15 | Stop reason: HOSPADM

## 2018-02-13 RX ORDER — MYCOPHENOLIC ACID 360 MG/1
720 TABLET, DELAYED RELEASE ORAL 2 TIMES DAILY
Status: DISCONTINUED | OUTPATIENT
Start: 2018-02-13 | End: 2018-02-15 | Stop reason: HOSPADM

## 2018-02-13 RX ORDER — AMLODIPINE BESYLATE 10 MG/1
10 TABLET ORAL DAILY
Status: DISCONTINUED | OUTPATIENT
Start: 2018-02-13 | End: 2018-02-15 | Stop reason: HOSPADM

## 2018-02-13 RX ORDER — POLYETHYLENE GLYCOL 3350 17 G/17G
17 POWDER, FOR SOLUTION ORAL DAILY
Status: DISCONTINUED | OUTPATIENT
Start: 2018-02-13 | End: 2018-02-15 | Stop reason: HOSPADM

## 2018-02-13 RX ORDER — FUROSEMIDE 40 MG
40 TABLET ORAL 2 TIMES DAILY
Status: DISCONTINUED | OUTPATIENT
Start: 2018-02-13 | End: 2018-02-15 | Stop reason: HOSPADM

## 2018-02-13 RX ORDER — OXYCODONE HYDROCHLORIDE 10 MG/1
10 TABLET ORAL DAILY PRN
Status: DISCONTINUED | OUTPATIENT
Start: 2018-02-13 | End: 2018-02-13

## 2018-02-13 RX ORDER — AMOXICILLIN 250 MG
1 CAPSULE ORAL 2 TIMES DAILY PRN
Status: DISCONTINUED | OUTPATIENT
Start: 2018-02-13 | End: 2018-02-15 | Stop reason: HOSPADM

## 2018-02-13 RX ORDER — AMOXICILLIN 250 MG
2 CAPSULE ORAL 2 TIMES DAILY PRN
Status: DISCONTINUED | OUTPATIENT
Start: 2018-02-13 | End: 2018-02-15 | Stop reason: HOSPADM

## 2018-02-13 RX ORDER — TACROLIMUS 1 MG/1
1 CAPSULE ORAL
Status: DISCONTINUED | OUTPATIENT
Start: 2018-02-13 | End: 2018-02-14

## 2018-02-13 RX ORDER — ERTAPENEM 1 G/1
1 INJECTION, POWDER, LYOPHILIZED, FOR SOLUTION INTRAMUSCULAR; INTRAVENOUS EVERY 24 HOURS
Status: DISCONTINUED | OUTPATIENT
Start: 2018-02-13 | End: 2018-02-15

## 2018-02-13 RX ORDER — DEXTROSE MONOHYDRATE 25 G/50ML
25-50 INJECTION, SOLUTION INTRAVENOUS
Status: DISCONTINUED | OUTPATIENT
Start: 2018-02-13 | End: 2018-02-15 | Stop reason: HOSPADM

## 2018-02-13 RX ORDER — NALOXONE HYDROCHLORIDE 0.4 MG/ML
.1-.4 INJECTION, SOLUTION INTRAMUSCULAR; INTRAVENOUS; SUBCUTANEOUS
Status: DISCONTINUED | OUTPATIENT
Start: 2018-02-13 | End: 2018-02-15 | Stop reason: HOSPADM

## 2018-02-13 RX ORDER — POTASSIUM CHLORIDE 750 MG/1
20-40 TABLET, EXTENDED RELEASE ORAL
Status: DISCONTINUED | OUTPATIENT
Start: 2018-02-13 | End: 2018-02-15 | Stop reason: HOSPADM

## 2018-02-13 RX ORDER — POTASSIUM CHLORIDE 1.5 G/1.58G
20-40 POWDER, FOR SOLUTION ORAL
Status: DISCONTINUED | OUTPATIENT
Start: 2018-02-13 | End: 2018-02-15 | Stop reason: HOSPADM

## 2018-02-13 RX ORDER — ACETAMINOPHEN 325 MG/1
975 TABLET ORAL ONCE
Status: DISCONTINUED | OUTPATIENT
Start: 2018-02-14 | End: 2018-02-14

## 2018-02-13 RX ORDER — MAGNESIUM SULFATE HEPTAHYDRATE 40 MG/ML
4 INJECTION, SOLUTION INTRAVENOUS EVERY 4 HOURS PRN
Status: DISCONTINUED | OUTPATIENT
Start: 2018-02-13 | End: 2018-02-15 | Stop reason: HOSPADM

## 2018-02-13 RX ORDER — POTASSIUM CHLORIDE 29.8 MG/ML
20 INJECTION INTRAVENOUS
Status: DISCONTINUED | OUTPATIENT
Start: 2018-02-13 | End: 2018-02-15 | Stop reason: HOSPADM

## 2018-02-13 RX ORDER — ONDANSETRON 4 MG/1
4 TABLET, ORALLY DISINTEGRATING ORAL EVERY 6 HOURS PRN
Status: DISCONTINUED | OUTPATIENT
Start: 2018-02-13 | End: 2018-02-15 | Stop reason: HOSPADM

## 2018-02-13 RX ORDER — POTASSIUM CL/LIDO/0.9 % NACL 10MEQ/0.1L
10 INTRAVENOUS SOLUTION, PIGGYBACK (ML) INTRAVENOUS
Status: DISCONTINUED | OUTPATIENT
Start: 2018-02-13 | End: 2018-02-15 | Stop reason: HOSPADM

## 2018-02-13 RX ORDER — POTASSIUM CHLORIDE 7.45 MG/ML
10 INJECTION INTRAVENOUS
Status: DISCONTINUED | OUTPATIENT
Start: 2018-02-13 | End: 2018-02-15 | Stop reason: HOSPADM

## 2018-02-13 RX ORDER — OXYCODONE HYDROCHLORIDE 5 MG/1
5-10 TABLET ORAL EVERY 6 HOURS PRN
Status: DISCONTINUED | OUTPATIENT
Start: 2018-02-13 | End: 2018-02-15 | Stop reason: HOSPADM

## 2018-02-13 RX ORDER — PREDNISONE 2.5 MG/1
2.5 TABLET ORAL DAILY
Status: DISCONTINUED | OUTPATIENT
Start: 2018-02-13 | End: 2018-02-15 | Stop reason: HOSPADM

## 2018-02-13 RX ORDER — ONDANSETRON 2 MG/ML
4 INJECTION INTRAMUSCULAR; INTRAVENOUS EVERY 6 HOURS PRN
Status: DISCONTINUED | OUTPATIENT
Start: 2018-02-13 | End: 2018-02-15 | Stop reason: HOSPADM

## 2018-02-13 RX ADMIN — FUROSEMIDE 40 MG: 40 TABLET ORAL at 14:09

## 2018-02-13 RX ADMIN — Medication 0.5 MG: at 05:27

## 2018-02-13 RX ADMIN — PREDNISONE 2.5 MG: 2.5 TABLET ORAL at 08:17

## 2018-02-13 RX ADMIN — ERTAPENEM SODIUM 1 G: 1 INJECTION, POWDER, LYOPHILIZED, FOR SOLUTION INTRAMUSCULAR; INTRAVENOUS at 18:46

## 2018-02-13 RX ADMIN — LINAGLIPTIN 5 MG: 5 TABLET, FILM COATED ORAL at 10:50

## 2018-02-13 RX ADMIN — MAGNESIUM OXIDE TAB 400 MG (241.3 MG ELEMENTAL MG) 400 MG: 400 (241.3 MG) TAB at 20:07

## 2018-02-13 RX ADMIN — Medication 0.5 MG: at 07:47

## 2018-02-13 RX ADMIN — ENOXAPARIN SODIUM 40 MG: 40 INJECTION SUBCUTANEOUS at 08:18

## 2018-02-13 RX ADMIN — SODIUM BICARBONATE 650 MG TABLET 650 MG: at 20:07

## 2018-02-13 RX ADMIN — MYCOPHENOLIC ACID 720 MG: 360 TABLET, DELAYED RELEASE ORAL at 08:19

## 2018-02-13 RX ADMIN — TACROLIMUS 1 MG: 1 CAPSULE ORAL at 08:19

## 2018-02-13 RX ADMIN — VITAMIN D, TAB 1000IU (100/BT) 1000 UNITS: 25 TAB at 08:17

## 2018-02-13 RX ADMIN — FUROSEMIDE 40 MG: 40 TABLET ORAL at 08:17

## 2018-02-13 RX ADMIN — GABAPENTIN 300 MG: 300 CAPSULE ORAL at 20:07

## 2018-02-13 RX ADMIN — GABAPENTIN 300 MG: 300 CAPSULE ORAL at 14:05

## 2018-02-13 RX ADMIN — SODIUM BICARBONATE 650 MG TABLET 650 MG: at 14:05

## 2018-02-13 RX ADMIN — TACROLIMUS 1 MG: 1 CAPSULE ORAL at 20:07

## 2018-02-13 RX ADMIN — MYCOPHENOLIC ACID 720 MG: 360 TABLET, DELAYED RELEASE ORAL at 20:07

## 2018-02-13 RX ADMIN — AMLODIPINE BESYLATE 10 MG: 10 TABLET ORAL at 08:17

## 2018-02-13 RX ADMIN — GABAPENTIN 300 MG: 300 CAPSULE ORAL at 08:17

## 2018-02-13 RX ADMIN — OMEPRAZOLE 20 MG: 20 CAPSULE, DELAYED RELEASE ORAL at 08:17

## 2018-02-13 RX ADMIN — SODIUM BICARBONATE 650 MG TABLET 650 MG: at 08:17

## 2018-02-13 NOTE — ED NOTES
[facility], [place]   ED Nurse to Floor Handoff     Camacho Bhagat is a 52 year old male who speaks English and lives alone,  in a home  They arrived in the ED by car from home    ED Chief Complaint: Abdominal Pain    ED Dx;   Final diagnoses:   Intra-abdominal infection         Needed?: No    Allergies:   Allergies   Allergen Reactions     Erythromycin GI Disturbance     Vioxx      Nausea, vomiting   .  Past Medical Hx:   Past Medical History:   Diagnosis Date     Depressive disorder, not elsewhere classified      Diabetes type 2, controlled (H) 11/10/2016     Esophageal reflux      Fibromyalgia 1/2009    dx with Dr Benitez( Rheum)     Gangrene of finger (H) 8/25/2017     H/O deep venous thrombosis 11/2001    Permanent IVC filter in place.     H/O CASTAÑEDA (nonalcoholic steatohepatitis)      H/O Pneumonia, organism unspecified(486) 10/2001    Included ARDS, sepsis, and  acute renal failure; hospitalized, required tracheostomy placement.     H/O: HTN (hypertension) 11/2001    No longer prescribed antihypertensive medication.       History of hepatocellular carcinoma      History of liver transplant (H)      History of obstructive sleep apnea     No longer wears CPAP since losing approximately 200 pounds with his liver transplant and its complications.       HLD (hyperlipidemia)      Ischemia of both lower extremities 8/25/2017    Distal ischemia due to shock/high pressor requirements     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     Osteoarthritis      Presence of PERMANENT IVC filter      Rheumatoid arthritis(714.0)       Baseline Mental status: WDL  Current Mental Status changes: at basesline    Infection: Yes  Sepsis suspected: No  Isolation type: No active isolations     Activity level - Baseline/Home:  Independent  Activity Level - Current:   Independent    Bariatric equipment needed?: No    In the ED these meds were given:   Medications   amLODIPine (NORVASC) tablet 10 mg (not  administered)   cholecalciferol (vitamin D3) tablet 1,000 Units (not administered)   furosemide (LASIX) tablet 40 mg (not administered)   gabapentin (NEURONTIN) capsule 300 mg (not administered)   insulin glargine (LANTUS) injection 25 Units (not administered)   magnesium oxide (MAG-OX) tablet 400 mg (not administered)   mycophenolic acid (GENERIC EQUIVALENT) EC tablet 720 mg (720 mg Oral Given 2/12/18 2324)   omeprazole (priLOSEC) CR capsule 20 mg (not administered)   oxyCODONE IR (ROXICODONE) tablet 10 mg (not administered)   polyethylene glycol (MIRALAX/GLYCOLAX) Packet 17 g (not administered)   predniSONE (DELTASONE) tablet 2.5 mg (not administered)   sodium bicarbonate tablet 650 mg (not administered)   tacrolimus (GENERIC EQUIVALENT) capsule 1 mg (1 mg Oral Given 2/12/18 2324)   naloxone (NARCAN) injection 0.1-0.4 mg (not administered)   melatonin tablet 1 mg (not administered)   enoxaparin (LOVENOX) injection 40 mg (not administered)   senna-docusate (SENOKOT-S;PERICOLACE) 8.6-50 MG per tablet 1 tablet (not administered)     Or   senna-docusate (SENOKOT-S;PERICOLACE) 8.6-50 MG per tablet 2 tablet (not administered)   magnesium hydroxide (MILK OF MAGNESIA) suspension 30 mL (not administered)   ondansetron (ZOFRAN-ODT) ODT tab 4 mg (not administered)     Or   ondansetron (ZOFRAN) injection 4 mg (not administered)   glucose 40 % gel 15-30 g (not administered)     Or   dextrose 50 % injection 25-50 mL (not administered)     Or   glucagon injection 1 mg (not administered)   HYDROmorphone (PF) (DILAUDID) injection 0.3-0.5 mg (0.5 mg Intravenous Given 2/13/18 0527)   0.9% sodium chloride infusion ( Intravenous New Bag 2/12/18 2224)   HYDROmorphone (PF) (DILAUDID) injection 0.5 mg (0.5 mg Intravenous Given 2/12/18 1900)   ertapenem (INVanz) 1 g vial to attach to  mL bag (1 g Intravenous New Bag 2/12/18 1941)       Drips running?  Yes    Home pump or pre-existing LDA's present? No    Labs results:   Labs Ordered  and Resulted from Time of ED Arrival Up to the Time of Departure from the ED   CBC WITH PLATELETS DIFFERENTIAL - Abnormal; Notable for the following:        Result Value    RBC Count 2.71 (*)     Hemoglobin 7.9 (*)     Hematocrit 24.8 (*)     RDW 15.3 (*)     Platelet Count 102 (*)     All other components within normal limits   GLUCOSE BY METER - Abnormal; Notable for the following:     Glucose 121 (*)     All other components within normal limits   COMPREHENSIVE METABOLIC PANEL - Abnormal; Notable for the following:     Potassium 3.3 (*)     Chloride 110 (*)     Glucose 128 (*)     Calcium 7.8 (*)     Albumin 2.4 (*)     Protein Total 6.0 (*)     Alkaline Phosphatase 170 (*)     All other components within normal limits   CBC WITH PLATELETS - Abnormal; Notable for the following:     RBC Count 2.55 (*)     Hemoglobin 7.2 (*)     Hematocrit 23.2 (*)     MCHC 31.0 (*)     RDW 15.3 (*)     Platelet Count 93 (*)     All other components within normal limits   INR   GLUCOSE MONITOR NURSING POCT   GLUCOSE MONITOR NURSING POCT   GLUCOSE MONITOR NURSING POCT   GLUCOSE MONITOR NURSING POCT   GLUCOSE MONITOR NURSING POCT   CBC WITH PLATELETS   COMPREHENSIVE METABOLIC PANEL   CRP INFLAMMATION   PULSE OXIMETRY NURSING   STRICT INTAKE AND OUTPUT   IP ASSIGN PROVIDER TEAM TO TREATMENT TEAM   VITAL SIGNS   NO INDWELLING URINARY CATHETER (MEDINA) PRESENT OR NEEDED   BLADDER SCAN   IP CARBOHYDRATE COUNTING BY NURSING   PATIENT EDUCATION   ASSESS FOR HYPOGLYCEMIA SYMPTOMS   NONE OF THE ABOVE CORE MEASURE DIAGNOSES       Imaging Studies:   Recent Results (from the past 24 hour(s))   CT Abdomen Pelvis w Contrast    Narrative    CT ABDOMEN AND PELVIS WITH CONTRAST  2/12/2018 2:50 PM     HISTORY:  Soft tissue mass, swelling, pain to the right lower  quadrant.  Suspect abscess.     TECHNIQUE: Axial images from the lung bases to the symphysis are  performed with additional coronal reformatted images. 100 mL of Isovue  370 are given  intravenously.  Radiation dose for this scan was reduced  using automated exposure control, adjustment of the mA and/or kV  according to patient size, or iterative reconstruction technique.     FINDINGS: Lung base atelectasis is noted dependently. Trace right  pleural effusion is present. No left pleural fluid.    Abdomen: Patient is status post liver transplant. Hepatosplenomegaly  is present. Liver measures nearly 22 cm in AP dimension while the  spleen measures 18 cm in AP dimension, both on image 25 of series 2.  Prior cholecystectomy. Pancreas and adrenal glands are unremarkable.  IVC filter is in place. Kidneys demonstrate mild hydronephrosis, but  there is symmetric enhancement. No obvious obstructing stone. Multiple  prominent retroperitoneal and tonia hepatis lymph nodes are present. A  portacaval node measures 2.9 x 1.4 cm on series 2, image 31. Ascites  is present. Small air collection is noted inferior to the right  kidney, possibly in the retroperitoneal space. Small fluid tract  extends towards the right lateral abdominal wall, possibly related to  the suspected infection. Mild ascites is present. Edema along the  right lower abdominal wall and lateral right lower abdominal wall is  noted. Edema and mesenteric stranding extend down into the presacral  and right lateral pelvic regions. Small amount of free air is noted  along the midline anterior abdomen on series 2, image 32. Question  whether patient has had any recent intra-abdominal procedures to  account for this air. Otherwise bowel perforation must be excluded. No  dilated loops of bowel are present to indicate obstruction. Colon is  predominantly decompressed. Anastomosis in the mid transverse colon  appears patent.    Pelvis: Bladder is partially distended but otherwise unremarkable.  Prostate gland and rectum are unremarkable. A few prominent inguinal  lymph nodes are noted bilaterally, larger on the right with a node on  image 82 of series 2  measuring 1.9 x 1.2 cm. Bone window examination  demonstrates degenerative spine changes.      Impression    IMPRESSION:  1. Free intraperitoneal air with fluid and air collection in the right  retroperitoneum concerning for developing infectious process. Discrete  abscess is not currently evident. Findings are concerning for bowel  leak in the absence of a recent intra-abdominal procedure. Significant  abdominal wall swelling and small amount of ascites is present.  2. Trace right pleural effusion. Bibasilar atelectasis is present.  3. Prior cholecystectomy changes. Hepatosplenomegaly is noted. Patient  is status post liver transplant procedure.    CHRISTIN WADE MD       Recent vital signs:   /67  Pulse 88  Temp 100.2  F (37.9  C) (Oral)  Resp 18  Ht 1.829 m (6')  Wt 90.7 kg (200 lb)  SpO2 93%  BMI 27.12 kg/m2    Cardiac Rhythm: Normal Sinus  Pt needs tele? No  Skin/wound Issues: None    Code Status: Full Code    Pain control: fair    Nausea control: good    Abnormal labs/tests/findings requiring intervention: N/A    Family present during ED course? No   Family Comments/Social Situation comments: N/A      Tasks needing completion: None    Momo Pan, WOJCIECH  [facility]-- [phone] [place] [facility]  [phone] [facility]

## 2018-02-13 NOTE — ED PROVIDER NOTES
[place] EMERGENCY DEPARTMENT ([facility])  2/12/18   History     Chief Complaint   Patient presents with     Abdominal Pain     The history is provided by the patient and the spouse.     Camacho Bhagat is a 52 year old male with a medical history significant for liver transplant (3/4/2017), recent colon infection (7/2017) who underwent takedown ileostomy on 1/5/2018, s/p cholecystectomy and type 2 diabetes mellitus who presents to the Emergency Department for evaluation of abdominal distention and abdominal pain.  Patient initially presented to [facility] Emergency Department where he had a CT abdomen/pelvis performed which showed some free air and fluid in the abdomen, which was not quite an abscess but was concerning for developing abscess.  He was started on Zosyn for coverage.  Patient was sent here where his colorectal surgeon, Dr. Dinh, is located.  Patient here reports that he first started noticing swelling in the right side of his abdomen and pain last night.  He denies any swelling anywhere else and denies ever having swelling in the abdomen in the past.  Patient denies any fevers, but was noted to have an elevated temperature of 100.2 on arrival here to the Emergency Department.  Patient denies any nausea, vomiting, diarrhea, chills, diaphoresis, cough, cold-like symptoms, runny nose, chest pain, shortness of breath, urinary symptoms or change in appetite.  Patient reports that his last bowel movement was around 12 PM today and was normal with no blood in the stool.  He denies any recent missed doses of any of his medications.  He denies having tenderness in his abdomen to palpation at baseline.    CT ABDOMEN AND PELVIS WITH CONTRAST  2/12/2018  IMPRESSION:  1. Free intraperitoneal air with fluid and air collection in the right  retroperitoneum concerning for developing infectious process. Discrete  abscess is not currently evident. Findings are concerning for bowel  leak in the  absence of a recent intra-abdominal procedure. Significant  abdominal wall swelling and small amount of ascites is present.  2. Trace right pleural effusion. Bibasilar atelectasis is present.  3. Prior cholecystectomy changes. Hepatosplenomegaly is noted. Patient  is status post liver transplant procedure.    I have reviewed the Medications, Allergies, Past Medical and Surgical History, and Social History in the [employer] system.    Past Medical History:   Diagnosis Date     Depressive disorder, not elsewhere classified      Diabetes type 2, controlled (H) 11/10/2016     Esophageal reflux      Fibromyalgia 1/2009    dx with Dr Benitez( Rheum)     Gangrene of finger (H) 8/25/2017     H/O deep venous thrombosis 11/2001    Permanent IVC filter in place.     H/O CASTAÑEDA (nonalcoholic steatohepatitis)      H/O Pneumonia, organism unspecified(486) 10/2001    Included ARDS, sepsis, and  acute renal failure; hospitalized, required tracheostomy placement.     H/O: HTN (hypertension) 11/2001    No longer prescribed antihypertensive medication.       History of hepatocellular carcinoma      History of liver transplant (H)      History of obstructive sleep apnea     No longer wears CPAP since losing approximately 200 pounds with his liver transplant and its complications.       HLD (hyperlipidemia)      Ischemia of both lower extremities 8/25/2017    Distal ischemia due to shock/high pressor requirements     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     Osteoarthritis      Presence of PERMANENT IVC filter      Rheumatoid arthritis(714.0)        Past Surgical History:   Procedure Laterality Date     BENCH LIVER N/A 3/4/2017    Procedure: BENCH LIVER;  Surgeon: Jovan Tran MD;  Location: [facility] OR     C TOTAL KNEE ARTHROPLASTY  2008    Right knee arthroscopy     CHOLECYSTECTOMY       COLONOSCOPY N/A 7/21/2017    Procedure: COMBINED COLONOSCOPY, SINGLE OR MULTIPLE BIOPSY/POLYPECTOMY BY BIOPSY;  Colonoscopy;  Surgeon: Izaiah Montes  MD;  Location: [facility] GI     ENT SURGERY      Repair of deviated septum     ESOPHAGOSCOPY, GASTROSCOPY, DUODENOSCOPY (EGD), COMBINED N/A 2016    Procedure: COMBINED ESOPHAGOSCOPY, GASTROSCOPY, DUODENOSCOPY (EGD), BIOPSY SINGLE OR MULTIPLE;  Surgeon: Trent Pederson MD;  Location:  GI     ESOPHAGOSCOPY, GASTROSCOPY, DUODENOSCOPY (EGD), COMBINED N/A 2017    Procedure: COMBINED ESOPHAGOSCOPY, GASTROSCOPY, DUODENOSCOPY (EGD);;  Surgeon: Los Wynn MD;  Location: [facility] GI     LAPAROTOMY EXPLORATORY N/A 2017    Procedure: LAPAROTOMY EXPLORATORY;  Exploratory Laparotomy, washout;  Surgeon: Tip Zhang MD;  Location: [facility] OR     LAPAROTOMY EXPLORATORY N/A 2017    Procedure: LAPAROTOMY EXPLORATORY;  Exploratory Laparotomy, Washout with closure.;  Surgeon: Tip Zhang MD;  Location: [facility] OR     LAPAROTOMY EXPLORATORY N/A 2017    Procedure: LAPAROTOMY EXPLORATORY;  Exploratory Laparotomy, Right angelique-colectomy, end ileostomy, mucosal fistula, partial omentectomy;  Surgeon: Sara Dinh MD;  Location: [facility] OR     ORTHOPEDIC SURGERY Right     Repair of dislocated wrist.       RELEASE TRIGGER FINGER Right 11/10/2017    Procedure: RELEASE TRIGGER FINGER;  Debride, V-Y Flap Right Index Finger;  Surgeon: Momo Noonan MD;  Location: [facility] OR     TAKEDOWN ILEOSTOMY N/A 2018    Procedure: TAKEDOWN ILEOSTOMY;  Exploratory Laparotomy, Lysis of Adhesions, Takedown Of End Ileostomy, Takedown of mucocutaneous fistula, ileocolic resection  And Colorectal Anastomosis, Primary repair of Ventral Hernia, Anesthesia Block ;  Surgeon: Sara Dinh MD;  Location: [facility] OR     TRACHEOSTOMY  2001    During hospitalization for pneumonia.       TRANSPLANT LIVER RECIPIENT  DONOR N/A 3/4/2017    Procedure: TRANSPLANT LIVER RECIPIENT  DONOR;  Surgeon: Jovan Tran MD;  Location: [facility] OR       Family History   Problem Relation Age of Onset     DIABETES  Father      Hypertension Father      Substance Abuse Father      Arthritis Mother      Thyroid Cancer Mother      Survivor!     Cervical Cancer Maternal Grandmother      CEREBROVASCULAR DISEASE Maternal Grandfather      Prostate Cancer Paternal Grandfather      Colon Cancer No family hx of      Hyperlipidemia No family hx of      Coronary Artery Disease No family hx of      Breast Cancer No family hx of        Social History   Substance Use Topics     Smoking status: Former Smoker     Packs/day: 0.75     Years: 2.00     Quit date: 1988     Smokeless tobacco: Former User     Types: Chew     Quit date: 10/8/2015     Alcohol use No      Comment: No alcohol since liver transplant.         Current Facility-Administered Medications   Medication     amLODIPine (NORVASC) tablet 10 mg     cholecalciferol (vitamin D3) tablet 1,000 Units     furosemide (LASIX) tablet 40 mg     insulin glargine (LANTUS) injection 25 Units     magnesium oxide (MAG-OX) tablet 400 mg     omeprazole (priLOSEC) CR capsule 20 mg     oxyCODONE IR (ROXICODONE) tablet 10 mg     polyethylene glycol (MIRALAX/GLYCOLAX) powder 17 g     predniSONE (DELTASONE) tablet 2.5 mg     naloxone (NARCAN) injection 0.1-0.4 mg     melatonin tablet 1 mg     enoxaparin (LOVENOX) injection 40 mg     senna-docusate (SENOKOT-S;PERICOLACE) 8.6-50 MG per tablet 1 tablet    Or     senna-docusate (SENOKOT-S;PERICOLACE) 8.6-50 MG per tablet 2 tablet     magnesium hydroxide (MILK OF MAGNESIA) suspension 30 mL     ondansetron (ZOFRAN-ODT) ODT tab 4 mg    Or     ondansetron (ZOFRAN) injection 4 mg     glucose 40 % gel 15-30 g    Or     dextrose 50 % injection 25-50 mL    Or     glucagon injection 1 mg     gabapentin (NEURONTIN) capsule 300 mg     mycophenolic acid (GENERIC EQUIVALENT) EC tablet 720 mg     sodium bicarbonate tablet 650 mg     tacrolimus (GENERIC EQUIVALENT) capsule 1 mg     HYDROmorphone (PF) (DILAUDID) injection 0.3-0.5 mg     0.9% sodium chloride infusion      Current Outpatient Prescriptions   Medication     predniSONE (DELTASONE) 2.5 MG tablet     furosemide (LASIX) 20 MG tablet     magnesium oxide (MAG-OX) 400 (241.3 MG) MG tablet     sodium bicarbonate 650 MG tablet     polyethylene glycol (MIRALAX) powder     cholecalciferol (VITAMIN D3) 1000 UNIT tablet     mycophenolic acid (GENERIC EQUIVALENT) 360 MG EC tablet     tacrolimus (GENERIC EQUIVALENT) 1 MG capsule     amLODIPine (NORVASC) 10 MG tablet     oxyCODONE IR (ROXICODONE) 10 MG tablet     CIALIS 5 MG tablet     OMEPRAZOLE PO     GABAPENTIN PO     insulin glargine (LANTUS) 100 UNIT/ML injection     linagliptin (TRADJENTA) 5 MG TABS tablet     cyclobenzaprine (FLEXERIL) 10 MG tablet     patiromer (VELTASSA) 8.4 G packet     fludrocortisone (FLORINEF) 0.1 MG tablet        Allergies   Allergen Reactions     Erythromycin GI Disturbance     Vioxx      Nausea, vomiting         Review of Systems   Constitutional: Negative for appetite change, chills, diaphoresis and fever.   HENT: Negative for congestion, rhinorrhea and sore throat.    Respiratory: Negative for cough and shortness of breath.    Cardiovascular: Negative for chest pain.   Gastrointestinal: Positive for abdominal distention (right sided) and abdominal pain (right sided). Negative for blood in stool, constipation, diarrhea, nausea and vomiting.   Genitourinary: Negative for difficulty urinating, dysuria, frequency, hematuria and urgency.   All other systems reviewed and are negative.      Physical Exam   BP: 152/78  Pulse: 88  Temp: 100.2  F (37.9  C)  Resp: 18  Height: 182.9 cm (6')  Weight: 90.7 kg (200 lb)  SpO2: 96 %      Physical Exam   Constitutional: No distress.   Adult male, alert, cooperative, NAD   HENT:   Head: Atraumatic.   Mouth/Throat: Oropharynx is clear and moist. No oropharyngeal exudate.   Eyes: Pupils are equal, round, and reactive to light. No scleral icterus.   Cardiovascular: Normal rate, regular rhythm, normal heart sounds and  intact distal pulses.    No murmur heard.  Pulmonary/Chest: Effort normal and breath sounds normal. No respiratory distress. He has no wheezes. He has no rales.   Abdominal: Soft. Bowel sounds are normal. There is tenderness. There is guarding.   Abdomen is soft, extremely TTP over R lateral portion of the abdominal wall and RLQ. Patient has what appears to be an indurated area on the lateral R abdominal wall, warm and red, very TTP, no fluctuance or sign of abscess. No L sided TTP.    Musculoskeletal: He exhibits no edema or tenderness.   Skin: Skin is warm. No rash noted. He is not diaphoretic.   Psychiatric:   Extremely irritable   Nursing note and vitals reviewed.      ED Course   6:18 PM  The patient was seen and examined by Monserrat Munoz MD in Room ED18.     ED Course     Procedures             Critical Care time:  none             Results for orders placed or performed during the hospital encounter of 02/12/18 (from the past 24 hour(s))   CBC with platelets differential   Result Value Ref Range    WBC 5.4 4.0 - 11.0 10e9/L    RBC Count 2.71 (L) 4.4 - 5.9 10e12/L    Hemoglobin 7.9 (L) 13.3 - 17.7 g/dL    Hematocrit 24.8 (L) 40.0 - 53.0 %    MCV 92 78 - 100 fl    MCH 29.2 26.5 - 33.0 pg    MCHC 31.9 31.5 - 36.5 g/dL    RDW 15.3 (H) 10.0 - 15.0 %    Platelet Count 102 (L) 150 - 450 10e9/L    Diff Method Automated Method     % Neutrophils 67.2 %    % Lymphocytes 25.0 %    % Monocytes 7.1 %    % Eosinophils 0.7 %    % Basophils 0.0 %    % Immature Granulocytes 0.0 %    Nucleated RBCs 0 0 /100    Absolute Neutrophil 3.6 1.6 - 8.3 10e9/L    Absolute Lymphocytes 1.3 0.8 - 5.3 10e9/L    Absolute Monocytes 0.4 0.0 - 1.3 10e9/L    Absolute Eosinophils 0.0 0.0 - 0.7 10e9/L    Absolute Basophils 0.0 0.0 - 0.2 10e9/L    Abs Immature Granulocytes 0.0 0 - 0.4 10e9/L    Absolute Nucleated RBC 0.0      *Note: Due to a large number of results and/or encounters for the requested time period, some results have not been  displayed. A complete set of results can be found in Results Review.              Assessments & Plan (with Medical Decision Making)   This is a 52-year-old male with a history of a liver transplant in March 2017 who presents to the Emergency Department as a transfer from [facility].  Patient had a long history with a prolonged hospitalization from July 4, 2017 to September 8, 2017 for typhlitis, abdominal compartment syndrome and an intraperitoneal abscess.  On July 27 patient had an ex lap, right hemicolectomy, and ileostomy, mucous fistula excision and partial omentectomy by colorectal surgery.  Ultimately he required percutaneous drainage of abscess.    The patient had an extensive workup done at [facility] today.  His labs are as follows: Lactate is 1.7, LFTs are within normal limits with the exception of a slightly elevated alk phos at 155, renal panel demonstrates normal electrolytes and a normal creatinine, glucose is 222.  CBC today shows a white count of 5.4 with a platelet count of 119,000, hemoglobin is 8.2.  He had a CT scan done today which demonstrates free intraperitoneal air with fluid and air collection in the right retroperitoneum concerning for developing infectious process, though a discrete abscess was not currently evident.  There was concern based on radiology read for a bowel leak in the setting of a recent intra-abdominal procedure.  There is significant abdominal wall swelling and a small amount of ascites present.    On exam, the patient has a temperature of 100.2 and is somewhat irritable.  Abdominal exam reveals a hard area of induration on the right side of the abdominal wall which is very tender to palpation.  It is slightly pink.  There is no sign of any drainage at this time.  He has very significant tenderness to palpation along the suprapubic and periumbilical region.  I have called the surgery resident.  The patient will need to be seen by the surgery team.  According to  the note from [facility] it was unclear whether the patient would be admitted to the transplant team, colorectal team or general surgery team.  The surgery resident will come and see the patient and further arrangements will be made per that evaluation.    Per surgery resident, they are recommending admission to medicine, IV antibiotics and IR drainage (if possible) tomorrow.  They will continue to follow along. WE have ordered dilaudid for pain and ertapenem for antibiotic coverage at this time.  Discussed with hospitalist and surgery team.     This part of the medical record was transcribed by Deniz Brown, Medical Scribe, from a dictation done by Monserrat Munoz MD.       I have reviewed the nursing notes.    I have reviewed the findings, diagnosis, plan and need for follow up with the patient.    New Prescriptions    No medications on file       Final diagnoses:   Intra-abdominal infection     IDeniz, am serving as a trained medical scribe to document services personally performed by Monserrat Munoz MD, based on the provider's statements to me.   IMonserrat MD, was physically present and have reviewed and verified the accuracy of this note documented by Deniz Brown.    2/12/2018   [facility], [place], EMERGENCY DEPARTMENT     Monserrat Munoz MD  02/13/18 0130

## 2018-02-13 NOTE — PROGRESS NOTES
Pt admitted to  room 5228. Pt c/o r hip/abdominal pain. Plan for IR today for possible perc drain. Pt NPO. Up independently in room. Pt oriented to call light, bed, phone, etc. See admission for details.     /76 (BP Location: Right arm)  Pulse 83  Temp 97.5  F (36.4  C) (Oral)  Resp 18  Ht 1.829 m (6')  Wt 90.7 kg (200 lb)  SpO2 97%  BMI 27.12 kg/m2     Educated pt that we are not responsible for lost or stolen items. Pt declined putting belongings in security.

## 2018-02-13 NOTE — PROGRESS NOTES
Colorectal Surgery Progress Note    ID: 52 year old with history of liver transplant c/f abscess v. Leak following ileostomy reversal on 1/5/28      S: Ongoing abdominal pain somewhat improved    O:  /76 (BP Location: Right arm)  Pulse 83  Temp 97.5  F (36.4  C) (Oral)  Resp 18  Ht 1.829 m (6')  Wt 90.7 kg (200 lb)  SpO2 97%  BMI 27.12 kg/m2    NAD  NLB on RA  Soft, moderately tender  WWP    A/P: 52 year old with history of liver transplant, s/p ileostomy reveral on 1/5 with primary re-enastamosis now with abdominal pain imaging concerning for leak v. Abscess  - Consult IR for possible drain placement.  - Broad spectrum antibiotics  - Further cares per primary team  - Colorectal will continue to follow    Seen with fellow, plan to be confirmed with staff

## 2018-02-13 NOTE — CONSULTS
[facility] Surgery Consultation    Camacho Bhagat MRN# [id number]   Age: 52 year old YOB: 1965     Date of Admission: 2/12/2018    Date of Consult:  02/12/18    Reason for consult: Abdominal fluid collection       Requesting service: ED                   Assessment and Plan:   Assessment:   52 year old man with subacute abscess vs leak following ileostomy reversal with ileocolic anastomosis on 1/5/18. Patient also with history of liver transplant. Currently hemodynamically normal, tolerating regular diet at home, and having localized abdominal pain without peritonitis.      Plan:   -recommend admission to medicine  -broad-spectrum IV antibiotics overnight and IR percutaneous drainage of intra-abdominal fluid & gas collection in the morning  -keep NPO for now  -colorectal surgery will follow the patient, please notify of clinical decompensation or changes     Discussed with the fellow who discussed with staff surgeon Dr. Mooeny            Chief Complaint:   Abdominal wall swelling          History of Present Illness:   52 year old man with history of liver transplant in 3/2017, then in 7/2017 presented with evidence of sepsis thought to be due to right-sided colon ischemia and concern for perforation taken for exploratory laparotomy, right colectomy, end ileostomy and mucus fistula. The patient recovered well from this operation and underwent ileostomy takedown with ileocolic anastomosis on 1/5/2018. He was discharged home on post-operative day 2. He has been doing well at home, eating normally, drinking well, no nausea or vomiting, having normal bowel movements, and denied fever. On Saturday 3 days ago he noted some increased (new) swelling along his right abdominal wall/flank with associated discomfort in that area. The collection of what he presumed was fluid increased in size over the subsequent days and he was concerned enough to present to an outside ED this morning. Again, he has been  eating/drinking/stooling normally during this time. He has a history of DVT with an IVC filter in place but does not currently take anticoagulant medications. He does not smoke or drink EtOH. He has otherwise felt well. He takes 2.5 mg of prednisone daily in addition to tacrolimus and MMF for immunosuppression.           Past Medical History:     Past Medical History:   Diagnosis Date     Depressive disorder, not elsewhere classified      Diabetes type 2, controlled (H) 11/10/2016     Esophageal reflux      Fibromyalgia 1/2009    dx with Dr Benitez( Rheum)     Gangrene of finger (H) 8/25/2017     H/O deep venous thrombosis 11/2001    Permanent IVC filter in place.     H/O CASTAÑEDA (nonalcoholic steatohepatitis)      H/O Pneumonia, organism unspecified(486) 10/2001    Included ARDS, sepsis, and  acute renal failure; hospitalized, required tracheostomy placement.     H/O: HTN (hypertension) 11/2001    No longer prescribed antihypertensive medication.       History of hepatocellular carcinoma      History of liver transplant (H)      History of obstructive sleep apnea     No longer wears CPAP since losing approximately 200 pounds with his liver transplant and its complications.       HLD (hyperlipidemia)      Ischemia of both lower extremities 8/25/2017    Distal ischemia due to shock/high pressor requirements     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     Osteoarthritis      Presence of PERMANENT IVC filter      Rheumatoid arthritis(714.0)              Past Surgical History:     Past Surgical History:   Procedure Laterality Date     BENCH LIVER N/A 3/4/2017    Procedure: BENCH LIVER;  Surgeon: Jovan Tran MD;  Location: [facility] TOTAL KNEE ARTHROPLASTY  2008    Right knee arthroscopy     CHOLECYSTECTOMY       COLONOSCOPY N/A 7/21/2017    Procedure: COMBINED COLONOSCOPY, SINGLE OR MULTIPLE BIOPSY/POLYPECTOMY BY BIOPSY;  Colonoscopy;  Surgeon: Izaiah Montes MD;  Location:  GI     ENT SURGERY      Repair of  deviated septum     ESOPHAGOSCOPY, GASTROSCOPY, DUODENOSCOPY (EGD), COMBINED N/A 2016    Procedure: COMBINED ESOPHAGOSCOPY, GASTROSCOPY, DUODENOSCOPY (EGD), BIOPSY SINGLE OR MULTIPLE;  Surgeon: Trent Pederson MD;  Location: [facility] GI     ESOPHAGOSCOPY, GASTROSCOPY, DUODENOSCOPY (EGD), COMBINED N/A 2017    Procedure: COMBINED ESOPHAGOSCOPY, GASTROSCOPY, DUODENOSCOPY (EGD);;  Surgeon: Los Wynn MD;  Location: [facility] GI     LAPAROTOMY EXPLORATORY N/A 2017    Procedure: LAPAROTOMY EXPLORATORY;  Exploratory Laparotomy, washout;  Surgeon: Tip Zhang MD;  Location: [facility] OR     LAPAROTOMY EXPLORATORY N/A 2017    Procedure: LAPAROTOMY EXPLORATORY;  Exploratory Laparotomy, Washout with closure.;  Surgeon: Tip Zhang MD;  Location: [facility] OR     LAPAROTOMY EXPLORATORY N/A 2017    Procedure: LAPAROTOMY EXPLORATORY;  Exploratory Laparotomy, Right angelique-colectomy, end ileostomy, mucosal fistula, partial omentectomy;  Surgeon: Sara Dinh MD;  Location: [facility] OR     ORTHOPEDIC SURGERY Right     Repair of dislocated wrist.       RELEASE TRIGGER FINGER Right 11/10/2017    Procedure: RELEASE TRIGGER FINGER;  Debride, V-Y Flap Right Index Finger;  Surgeon: Momo Noonan MD;  Location: [facility]     TAKEDOWN ILEOSTOMY N/A 2018    Procedure: TAKEDOWN ILEOSTOMY;  Exploratory Laparotomy, Lysis of Adhesions, Takedown Of End Ileostomy, Takedown of mucocutaneous fistula, ileocolic resection  And Colorectal Anastomosis, Primary repair of Ventral Hernia, Anesthesia Block ;  Surgeon: Sara Dinh MD;  Location: [facility] OR     TRACHEOSTOMY  2001    During hospitalization for pneumonia.       TRANSPLANT LIVER RECIPIENT  DONOR N/A 3/4/2017    Procedure: TRANSPLANT LIVER RECIPIENT  DONOR;  Surgeon: Jovan Tran MD;  Location: [facility] OR             Social History:   Tobacco: non-smoker   EtOH: does not use           Family History:     Family History    Problem Relation Age of Onset     DIABETES Father      Hypertension Father      Substance Abuse Father      Arthritis Mother      Thyroid Cancer Mother      Survivor!     Cervical Cancer Maternal Grandmother      CEREBROVASCULAR DISEASE Maternal Grandfather      Prostate Cancer Paternal Grandfather      Colon Cancer No family hx of      Hyperlipidemia No family hx of      Coronary Artery Disease No family hx of      Breast Cancer No family hx of              Allergies:   Erythromycin          Medications:     No current facility-administered medications for this encounter.      Current Outpatient Prescriptions   Medication Sig     patiromer (VELTASSA) 8.4 G packet Take 8.4 g by mouth daily     fludrocortisone (FLORINEF) 0.1 MG tablet Take 1 tablet (0.1 mg) by mouth daily     predniSONE (DELTASONE) 2.5 MG tablet Take 1 tablet (2.5 mg) by mouth daily     furosemide (LASIX) 20 MG tablet Take 2 tablets (40 mg) by mouth 2 times daily     magnesium oxide (MAG-OX) 400 (241.3 MG) MG tablet Take 1 tablet (400 mg) by mouth daily     sodium bicarbonate 650 MG tablet Take 1 tablet (650 mg) by mouth 3 times daily     polyethylene glycol (MIRALAX) powder Take 17 g (1 capful) by mouth daily     cholecalciferol (VITAMIN D3) 1000 UNIT tablet Take 1 tablet (1,000 Units) by mouth daily     mycophenolic acid (GENERIC EQUIVALENT) 360 MG EC tablet Take 2 tablets (720 mg) by mouth every 12 hours     tacrolimus (GENERIC EQUIVALENT) 1 MG capsule Take 1 capsule (1 mg) by mouth every 12 hours     amLODIPine (NORVASC) 10 MG tablet Take 1 tablet (10 mg) by mouth daily (Patient taking differently: Take 10 mg by mouth every morning )     oxyCODONE IR (ROXICODONE) 10 MG tablet Take 1 tablet (10 mg) by mouth daily as needed for moderate to severe pain     CIALIS 5 MG tablet Take 5 mg by mouth as needed      OMEPRAZOLE PO Take 20 mg by mouth every morning      GABAPENTIN PO Take 300 mg by mouth 3 times daily     insulin glargine (LANTUS) 100  UNIT/ML injection Inject 50 Units Subcutaneous every morning     linagliptin (TRADJENTA) 5 MG TABS tablet Take 5 mg by mouth every evening      cyclobenzaprine (FLEXERIL) 10 MG tablet Take 1 tablet (10 mg) by mouth 3 times daily as needed for muscle spasms             Review of Systems:   No recent weight loss, fevers, or chills  No jaundice  No headaches or light-headedness, no dizziness  No cough, chest pain, shortness of breath  No pain or burning with urination  No numbness or tingling of the extremities          Physical Exam:   All vitals have been reviewed  Temp:  [98.8  F (37.1  C)-100.2  F (37.9  C)] 100.2  F (37.9  C)  Pulse:  [88] 88  Heart Rate:  [94] 94  Resp:  [18] 18  BP: (137-165)/() 152/78  SpO2:  [96 %-100 %] 96 %  No intake or output data in the 24 hours ending 02/12/18 1907  Physical Exam:  General: awake, alert, NAD   HEENT: no neck swelling or tenderness   CV: RRR, no tachycardia  Pulm: non-labored breathing on room air   Abd: soft, no intra-abdominal tenderness to palpation, swelling and induration of right flank/abdominal wall which is tender to the touch   Neuro: moving all four extremities, speech clear  Extremities: no lower extremity edema  Vascular: palpable peripheral pulses   Skin: no rashes or jaundice       Data:   All laboratory data reviewed    Results:  BMP  Recent Labs  Lab 02/12/18  1232      POTASSIUM 3.8   CHLORIDE 107   CO2 30   BUN 18   CR 1.24   *     CBC  Recent Labs  Lab 02/12/18  1232   WBC 5.4   HGB 8.2*   *     LFT  Recent Labs  Lab 02/12/18  1232   AST 18   ALT 18   ALKPHOS 155*   BILITOTAL 0.5   ALBUMIN 2.6*       Recent Labs  Lab 02/12/18  1232   *       Imaging: CT abdomen reveals a gas and fluid collection in the right pericolic gutter, consistent with anastomotic failure or post-operative abscess, this tracks into the right abdominal wall soft tissues.     Jonathan Birmingham MD      Staff Addendum:  Agree with the consultation  H&P as documented by the housestaff. I was personally involved with the recommendations made by our service for this patient.  Sara Dinh MD  Colon and Rectal Surgery Staff  [facility]

## 2018-02-13 NOTE — PROGRESS NOTES
[facility], [place]    Internal Medicine Progress Note - Gold Service      Assessment & Plan    Camacho Bhagat is a 52 year old male with a pmh of liver transplant (3/4/17), colon infection (7/2017) s/p takedown ileostomy (1/5/18) and DM II who presented to OSH ED due to abdominal distention, pain      # Abdominal distention, pain 2/2 subacute abscess vs leak following ileostomy reversal (1/5/18). Hx of neutropenic enterocolitis w/ colon perforation requiring subsequent ileostomy in 7/2017 followed by takedown 1/5/18. CT AP w/ contrast on admission showing free intraperitoneal air w/ fluid and air collection in R retroperitoneum concerning for developing infectious process, discrete abscess is not currently evident, findings concerning for bowel leak in the absence of a recent intra-abdominal procedure. Evaluated by colorectal surgery in ED.   Discussed with interventional radiology and colorectal surgery   No indication for drain placement due to lack of well-formed fluid collection and no plans for further intervention from colorectal surgery as they do not think this is related to the leak;  For infection we will continue with IV ertapenem; also obtaining blood culture which is pending   Due to lack of culture data we will potentially treat with broad-spectrum antibiotics   continue to follow  Advance diet as tolerated    Liver transplant 2/2 liver cancer r/t CASTAÑEDA w/ subsequent cirrhosis (3/4/17). Tacro 1 mg Q12H, prednisone 2.5 mg QD,  mg Q12H.  - Continue immunosuppression   -Transplant surgery consulted for immunosuppression management      CKD III. Baseline creatinine ~ 1.1. Recently seen in nephrology clinic 1/31/18. [place] etiology multifactorial (DM, tacro). Cr stable.   - CMP     DM. PTA managed on lantus 50 units qam and tradjenta 5 mg qpm  - Decrease lantus to 15 units given NPO status  - hold trajenta given NPO status  -we will resume Tradjenta     HTN. BP stable on  admission. PTA managed on lasix 40 mg BID, amlodipine 10 mg qd  - QD weights  - Continue PTA meds     Anemia. Hgb 7.9. Thought 2/2 CKD w/ decreased absorption 2/2 bowel resection. Denies melena, hematemesis, fatigue.  - CBC     Hx of DVT. W/ permanent IVC filter        # Pain Assessment:     Current Pain Score 2/12/2018 2/12/2018 1/7/2018   Patient currently in pain? - - yes   Pain score (0-10) 7 10 -   Pain location - - -   Pain descriptors - - -   CPOT pain score - - -   - Camacho is experiencing pain due to abdominal abscess. Pain management was discussed and the plan was created in a collaborative fashion.  Camacho's response to the current recommendations: engaged  - Please see the plan for pain management as documented above  -We will discontinue IV hydromorphone and start on oral oxycodone as needed              Diet: Full Liquid Diet  Fluids: Oral  DVT Prophylaxis: Enoxaparin (Lovenox) SQ  Code Status: Full Code    Disposition Plan   Expected discharge: Tomorrow, recommended to prior living arrangement once antibiotic plan established.     Entered: Paulo Hunter 02/13/2018, 10:20 AM   Information in the above section will display in the discharge planner report.      The patient's care was discussed with the Bedside Nurse.    Paulo Hunter  Internal Medicine Staff Hospitalist Service  [facility]  Pager: [phone]  Please see sticky note for cross cover information    Interval History   Patient mentioned he had swelling and redness in the right side of the abdomen; also started having pain since yesterday  He was recently seen by transplant surgery; denies nausea vomiting, diarrhea or loss of appetite  Mentioned that he is on medication for high potassium; does not want to take potassium supplement     Last 24 hr care team notes reviewed.   ROS:  4 point ROS including Respiratory, CV, GI and , other than that noted in the HPI, is negative.       Data reviewed today: I reviewed all  medications, new labs and imaging results over the last 24 hours. I personally reviewed CT abdomen was reviewed which showed free intraperitoneal fluid and air collection  Physical Exam   Vital Signs: Temp: 97.5  F (36.4  C) Temp src: Oral BP: 161/76 Pulse: 83   Resp: 18 SpO2: 97 % O2 Device: None (Room air)    Weight: 200 lbs 0 oz  General Appearance: Patient looks comfortable on exam  Respiratory: Bilateral equal breath sounds with no added sound   Cardiovascular: S1-S2 with no murmur  GI: Soft, tender, brawny induration in the right lower abdomen; bowel sounds noted  Skin: Induration with cyst lesion in the right abdomen  Neuro:AAOx3, b/l UE and LE-5/5           Data   Data     Recent Labs  Lab 02/13/18  0523 02/12/18  1904 02/12/18  1232   WBC 5.0 5.4 5.4   HGB 7.2* 7.9* 8.2*   MCV 91 92 92   PLT 93* 102* 119*   INR 1.05  --   --      --  141   POTASSIUM 3.3*  --  3.8   CHLORIDE 110*  --  107   CO2 26  --  30   BUN 16  --  18   CR 1.20  --  1.24   ANIONGAP 9  --  4   JACOB 7.8*  --  8.2*   *  --  222*   ALBUMIN 2.4*  --  2.6*   PROTTOTAL 6.0*  --  6.8   BILITOTAL 0.4  --  0.5   ALKPHOS 170*  --  155*   ALT 14  --  18   AST 18  --  18

## 2018-02-13 NOTE — PROGRESS NOTES
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 His post-transplant course was complicated by neutropenic colitis requiring an ileostomy placement in 7/2017, which was then reversed on 1/5/18. Patient was admitted to medicine service with abdominal distention and pain, and was found to have free intraperitoneal air with fluid collection concerning for a postoperative leak vs abscess. Transplant surgery consulted for immunosuppression management.    Graft function: Good, stable. Alk phos mildly elevated at 170, other LFTs and Tbili are normal.   Immunosuppression management: On Tacrolimus 1mg BID, Myfortic 720mg BID, and Prednisone 2.5mg daily PTA. Tacrolimus levels have been undetectable in recent months, although goal range is 3-5 so unclear how close he has been to goal. Will change Tacrolimus dosing to BID IS and check a trough level tomorrow.    Complexity of management: Medium. Contributing factors: active infection     PILLO/Fellow/Resident Provider: Felicia Tolliver NP x [phone]    Faculty: Drew Dalal M.D.  __________________________________________________________________  Transplant History: Admitted 2/12/2018 for abdominal distention and pain.   The patient has a history of liver failure due to cryptogenic cirrhosis.    3/4/2017 (Liver), Postoperative day: 346     Interval History: History is obtained from the patient  Overnight events: Feeling well today, and has no complaints other than pain in his left abdomen near site of possible infection. Otherwise feeling good, with a good appetite and no nausea.    ROS:   A 10-point review of systems was negative except as noted above.    Curent Meds:    amLODIPine  10 mg Oral Daily     cholecalciferol  1,000 Units Oral Daily     furosemide  40 mg Oral BID      insulin glargine  25 Units Subcutaneous QAM     magnesium oxide  400 mg Oral Daily     omeprazole (priLOSEC) CR capsule 20 mg  20 mg Oral QAM     polyethylene glycol  17 g Oral Daily     predniSONE  2.5 mg Oral Daily     enoxaparin  40 mg Subcutaneous Q24H     linagliptin  5 mg Oral Daily     gabapentin (NEURONTIN) capsule 300 mg  300 mg Oral TID     mycophenolic acid  720 mg Oral Q12H     sodium bicarbonate  650 mg Oral TID     tacrolimus  1 mg Oral Q12H       Physical Exam:     Admit Weight: 90.7 kg (200 lb)    Vital sign ranges:    Temp:  [97.5  F (36.4  C)-100.2  F (37.9  C)] 97.5  F (36.4  C)  Pulse:  [83-88] 83  Resp:  [18] 18  BP: (131-161)/(67-87) 161/76  SpO2:  [91 %-100 %] 97 %    General Appearance: in no apparent distress.   Skin: normal, warm, dry, No rashes, induration, or jaundice.  Heart: well perfused  Lungs: nonlabored breathing on room air  Abdomen: rounded and symmetric, and  moderately tender on left abdomen at site of dime-sized area with pus present. Redness and induration present in surrounding area. Surgical incision from ileostomy takedown in mid-abdomen healing well.  : No Crowe in place, voiding spontaneously.  Extremities: edema: absent.  Neurologic: awake, alert and oriented. Tremor absent.    Data:   CMP  Recent Labs  Lab 02/13/18  0523 02/12/18  1232    141   POTASSIUM 3.3* 3.8   CHLORIDE 110* 107   CO2 26 30   * 222*   BUN 16 18   CR 1.20 1.24   GFRESTIMATED 63 61   GFRESTBLACK 77 74   JACOB 7.8* 8.2*   MAG  --  2.1   PHOS  --  3.0   ALBUMIN 2.4* 2.6*   BILITOTAL 0.4 0.5   ALKPHOS 170* 155*   AST 18 18   ALT 14 18     CBC  Recent Labs  Lab 02/13/18  0523 02/12/18  1904   HGB 7.2* 7.9*   WBC 5.0 5.4   PLT 93* 102*     COAGS  Recent Labs  Lab 02/13/18  0523   INR 1.05      Urinalysis  Recent Labs   Lab Test  01/31/18   1213  01/29/18   1235   10/27/17   0942   COLOR   --   Yellow   --   Yellow   APPEARANCE   --   Clear   --   Slightly Cloudy   URINEGLC   --   150*   --    Negative   URINEBILI   --   Negative   --   Negative   URINEKETONE   --   Negative   --   Negative   SG   --   1.013   --   1.008   UBLD   --   Small*   --   Negative   URINEPH   --   6.0   --   5.0   PROTEIN   --   >499*   --   10*   NITRITE   --   Negative   --   Negative   LEUKEST   --   Negative   --   Negative   RBCU   --   4*   --   2   WBCU   --   4*   --   6*   UTPG  9.57*  8.51*   < >  0.46*    < > = values in this interval not displayed.     Virology:  CMV DNA Quantitation Specimen   Date Value Ref Range Status   01/07/2018 Plasma, EDTA anticoagulant  Final     Hepatitis C Antibody   Date Value Ref Range Status   03/03/2017  NR Final    Nonreactive   Assay performance characteristics have not been established for newborns,   infants, and children

## 2018-02-13 NOTE — PHARMACY-ADMISSION MEDICATION HISTORY
Admission medication history interview status for the 2/12/2018 admission is complete. See Epic admission navigator for allergy information, pharmacy, prior to admission medications and immunization status.     Medication history interview sources:    -Patient was an excellent historian and familiar with all medications and last doses.     Changes made to PTA medication list (reason)  Added:   -N/A  Deleted:   -N/A  Changed:   -Linagliptin: time of medication intake changed from evening to morning per patient.     Additional medication history information (including reliability of information, actions taken by pharmacist):  -Per patient, he was instructed by MD to hold fludrocortisone and patiromer on 2-6-18. Restart date unknown to patient.   -Patient verified dose for mycophenolic acid (generic) - 720 mg by mouth every 12 hours.   -Patient verified dose for tacrolimus (generic) - 1 mg by mouth every 12 hours.   -Per patient, his last oxycodone dose was one month ago.   -Per patient, erythromycin and Vioxx caused GI upset.   -Patient received an influenza vaccination on 10-2-17.       Prior to Admission medications    Medication Sig Last Dose Taking? Auth Provider   predniSONE (DELTASONE) 2.5 MG tablet Take 1 tablet (2.5 mg) by mouth daily 2/12/2018 at AM Yes Jovan Tran MD   furosemide (LASIX) 20 MG tablet Take 2 tablets (40 mg) by mouth 2 times daily 2/12/2018 at AM Yes Cinda Cazares NP   magnesium oxide (MAG-OX) 400 (241.3 MG) MG tablet Take 1 tablet (400 mg) by mouth daily 2/11/2018 at PM Yes Cinda Cazares NP   sodium bicarbonate 650 MG tablet Take 1 tablet (650 mg) by mouth 3 times daily 2/12/2018 at AM Yes Cinda Cazares NP   polyethylene glycol (MIRALAX) powder Take 17 g (1 capful) by mouth daily PRN Yes Sara Dinh MD   cholecalciferol (VITAMIN D3) 1000 UNIT tablet Take 1 tablet (1,000 Units) by mouth daily 2/12/2018 at AM Yes Cinda Cazares NP    mycophenolic acid (GENERIC EQUIVALENT) 360 MG EC tablet Take 2 tablets (720 mg) by mouth every 12 hours 2/12/2018 at AM Yes Jovan Tran MD   tacrolimus (GENERIC EQUIVALENT) 1 MG capsule Take 1 capsule (1 mg) by mouth every 12 hours 2/12/2018 at AM Yes Toñito Camejo MD   amLODIPine (NORVASC) 10 MG tablet Take 1 tablet (10 mg) by mouth daily  Patient taking differently: Take 10 mg by mouth every morning  2/12/2018 at AM Yes Ninfa Hernández MD   oxyCODONE IR (ROXICODONE) 10 MG tablet Take 1 tablet (10 mg) by mouth daily as needed for moderate to severe pain PRN Yes Shay Kirkpatrick MD   CIALIS 5 MG tablet Take 5 mg by mouth as needed  PRN Yes Reported, Patient   OMEPRAZOLE PO Take 20 mg by mouth every morning  2/12/2018 at AM Yes Reported, Patient   GABAPENTIN PO Take 300 mg by mouth 3 times daily 2/12/2018 at AM Yes Reported, Patient   insulin glargine (LANTUS) 100 UNIT/ML injection Inject 50 Units Subcutaneous every morning 2/12/2018 at AM Yes Reported, Patient   linagliptin (TRADJENTA) 5 MG TABS tablet Take 5 mg by mouth daily  2/12/2018 at AM Yes Reported, Patient   cyclobenzaprine (FLEXERIL) 10 MG tablet Take 1 tablet (10 mg) by mouth 3 times daily as needed for muscle spasms PRN Yes Felicia Tolliver, LAMIN CNP   patiromer (VELTASSA) 8.4 G packet Take 8.4 g by mouth daily ON HOLD  Cinda Cazares NP   fludrocortisone (FLORINEF) 0.1 MG tablet Take 1 tablet (0.1 mg) by mouth daily OH HOLD  Cinda Cazares NP         Medication history completed by: Francine Vasquez, Pharmacy Intern

## 2018-02-13 NOTE — H&P
[facility]    Internal Medicine History and Physical - Gold Service       Date of Admission:  2/12/2018    Assessment & Plan   Camacho Bhagat is a 52 year old male with a pmh of liver transplant (3/4/17), colon infection (7/2017) s/p takedown ileostomy (1/5/18) and DM II who presented to OSH ED due to abdominal distention, pain     # Abdominal distention, pain 2/2 subacute abscess vs leak following ileostomy reversal (1/5/18). Hx of neutropenic enterocolitis w/ colon perforation requiring subsequent ileostomy in 7/2017 followed by takedown 1/5/18. CT AP w/ contrast on admission showing free intraperitoneal air w/ fluid and air collection in R retroperitoneum concerning for developing infectious process, discrete abscess is not currently evident, findings concerning for bowel leak in the absence of a recent intra-abdominal procedure. Evaluated by colorectal surgery in ED. Started on Ertapenum. No white count, afebrile  - Colorectal surgery consulted  - Continue IV ertapenum  - IR perc drainage of intra-abdominal fluid and gas collection in am  - NPO  - IV dilaudid for pain management while NPO    Liver transplant 2/2 liver cancer r/t CASTAÑEDA w/ subsequent cirrhosis (3/4/17). Tacro 1 mg Q12H, prednisone 2.5 mg QD,  mg Q12H.  - Continue immunosuppression     CKD III. Baseline creatinine ~ 1.1. Recently seen in nephrology clinic 1/31/18. [place] etiology multifactorial (DM, tacro). Cr stable.   - CMP    DM. PTA managed on lantus 50 units qam and tradjenta 5 mg qpm  - Decrease lantus to 15 units given NPO status  - hold trajenta given NPO status, resume when appropriate    HTN. BP stable on admission. PTA managed on lasix 40 mg BID, amlodipine 10 mg qd  - QD weights  - Continue PTA meds    Anemia. Hgb 7.9. Thought 2/2 CKD w/ decreased absorption 2/2 bowel resection. Denies melena, hematemesis, fatigue.  - CBC    Hx of DVT. W/ permanent IVC filter      # Pain Assessment:   Current  "Pain Score 2/12/2018 2/12/2018 1/7/2018   Patient currently in pain? - - yes   Pain score (0-10) 7 10 -   Pain location - - -   Pain descriptors - - -   CPOT pain score - - -   - Camacho is experiencing pain due to abdominal abscess. Pain management was discussed and the plan was created in a collaborative fashion.  Camacho's response to the current recommendations: engaged  - Please see the plan for pain management as documented above        Diet:  NPO  Fluids: NS @ 75  DVT Prophylaxis: Enoxaparin (Lovenox) SQ  Code Status: Prior    Disposition Plan   Expected discharge: 4 - 7 days; recommended to prior living arrangement once antibiotic plan established and intervention planned.     Entered: Cassandra Cooley 02/12/2018, 8:35 PM   Information in the above section will display in the discharge planner report.    The patient's care was discussed with the Attending Physician, Dr. Archer.    Cassandra Cooley  Internal Medicine Staff Hospitalist Service  [facility]  Pager: [phone]  Please see sticky note for cross cover information  ______________________________________________________________________    Chief Complaint   Abdominal pain      History of Present Illness   Camacho Bhagat is a 52 year old male with a pmh of liver transplant (3/4/17), colon infection (7/2017) requiring ileostomy s/p takedown ileostomy (1/5/18) and DM II who presented to OSH ED due to abdominal distention, pain.    Three days prior to admission patient noted swelling along right abdominal wall/flank w/ associated discomfort in that area, which increased in size over subsequent days prompting presentation to ED. CT scan in ED showing free air and fluid collection.    Currently complains of severe R lower quadrant/flank abdominal pain, rates this pain \"15/10\" and is stabbing in nature.     Denies fevers or chills. No chest pain, shortness of breath or difficulty breathing.     Review of Systems   The 10 point Review of Systems is " negative other than noted in the HPI or here.     Past Medical History    I have reviewed this patient's medical history and updated it with pertinent information if needed.   Past Medical History:   Diagnosis Date     Depressive disorder, not elsewhere classified      Diabetes type 2, controlled (H) 11/10/2016     Esophageal reflux      Fibromyalgia 1/2009    dx with Dr Benitez( Rheum)     Gangrene of finger (H) 8/25/2017     H/O deep venous thrombosis 11/2001    Permanent IVC filter in place.     H/O CASTAÑEDA (nonalcoholic steatohepatitis)      H/O Pneumonia, organism unspecified(486) 10/2001    Included ARDS, sepsis, and  acute renal failure; hospitalized, required tracheostomy placement.     H/O: HTN (hypertension) 11/2001    No longer prescribed antihypertensive medication.       History of hepatocellular carcinoma      History of liver transplant (H)      History of obstructive sleep apnea     No longer wears CPAP since losing approximately 200 pounds with his liver transplant and its complications.       HLD (hyperlipidemia)      Ischemia of both lower extremities 8/25/2017    Distal ischemia due to shock/high pressor requirements     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     Osteoarthritis      Presence of PERMANENT IVC filter      Rheumatoid arthritis(714.0)        Past Surgical History   I have reviewed this patient's surgical history and updated it with pertinent information if needed.  Past Surgical History:   Procedure Laterality Date     BENCH LIVER N/A 3/4/2017    Procedure: BENCH LIVER;  Surgeon: Jovan Tran MD;  [facility]: [facility] TOTAL KNEE ARTHROPLASTY  2008    Right knee arthroscopy     CHOLECYSTECTOMY       COLONOSCOPY N/A 7/21/2017    Procedure: COMBINED COLONOSCOPY, SINGLE OR MULTIPLE BIOPSY/POLYPECTOMY BY BIOPSY;  Colonoscopy;  Surgeon: Izaiah Montes MD;  Location:  GI     ENT SURGERY      Repair of deviated septum     ESOPHAGOSCOPY, GASTROSCOPY, DUODENOSCOPY (EGD), COMBINED N/A  2016    Procedure: COMBINED ESOPHAGOSCOPY, GASTROSCOPY, DUODENOSCOPY (EGD), BIOPSY SINGLE OR MULTIPLE;  Surgeon: Trent Pederson MD;  Location: [facility] GI     ESOPHAGOSCOPY, GASTROSCOPY, DUODENOSCOPY (EGD), COMBINED N/A 2017    Procedure: COMBINED ESOPHAGOSCOPY, GASTROSCOPY, DUODENOSCOPY (EGD);;  Surgeon: Los Wnyn MD;  Location: [facility] GI     LAPAROTOMY EXPLORATORY N/A 2017    Procedure: LAPAROTOMY EXPLORATORY;  Exploratory Laparotomy, washout;  Surgeon: Tip Zhang MD;  Location: [facility] OR     LAPAROTOMY EXPLORATORY N/A 2017    Procedure: LAPAROTOMY EXPLORATORY;  Exploratory Laparotomy, Washout with closure.;  Surgeon: Tip Zhang MD;  Location: [facility] OR     LAPAROTOMY EXPLORATORY N/A 2017    Procedure: LAPAROTOMY EXPLORATORY;  Exploratory Laparotomy, Right agnelique-colectomy, end ileostomy, mucosal fistula, partial omentectomy;  Surgeon: Sara Dinh MD;  Location: [facility] OR     ORTHOPEDIC SURGERY Right     Repair of dislocated wrist.       RELEASE TRIGGER FINGER Right 11/10/2017    Procedure: RELEASE TRIGGER FINGER;  Debride, V-Y Flap Right Index Finger;  Surgeon: Momo Noonan MD;  Location: [facility] OR     TAKEDOWN ILEOSTOMY N/A 2018    Procedure: TAKEDOWN ILEOSTOMY;  Exploratory Laparotomy, Lysis of Adhesions, Takedown Of End Ileostomy, Takedown of mucocutaneous fistula, ileocolic resection  And Colorectal Anastomosis, Primary repair of Ventral Hernia, Anesthesia Block ;  Surgeon: Sara Dinh MD;  Location: [facility] OR     TRACHEOSTOMY  2001    During hospitalization for pneumonia.       TRANSPLANT LIVER RECIPIENT  DONOR N/A 3/4/2017    Procedure: TRANSPLANT LIVER RECIPIENT  DONOR;  Surgeon: Jovan Tran MD;  Location: [facility] OR       Social History   Social History   Substance Use Topics     Smoking status: Former Smoker     Packs/day: 0.75     Years: 2.00     Quit date:      Smokeless tobacco: Former User     Types: Chew      Quit date: 10/8/2015     Alcohol use No      Comment: No alcohol since liver transplant.         Family History   I have reviewed this patient's family history and updated it with pertinent information if needed.   Family History   Problem Relation Age of Onset     DIABETES Father      Hypertension Father      Substance Abuse Father      Arthritis Mother      Thyroid Cancer Mother      Survivor!     Cervical Cancer Maternal Grandmother      CEREBROVASCULAR DISEASE Maternal Grandfather      Prostate Cancer Paternal Grandfather      Colon Cancer No family hx of      Hyperlipidemia No family hx of      Coronary Artery Disease No family hx of      Breast Cancer No family hx of        Prior to Admission Medications   Prior to Admission Medications   Prescriptions Last Dose Informant Patient Reported? Taking?   CIALIS 5 MG tablet   Yes No   Sig: Take 5 mg by mouth as needed    GABAPENTIN PO   Yes No   Sig: Take 300 mg by mouth 3 times daily   OMEPRAZOLE PO   Yes No   Sig: Take 20 mg by mouth every morning    amLODIPine (NORVASC) 10 MG tablet   No No   Sig: Take 1 tablet (10 mg) by mouth daily   Patient taking differently: Take 10 mg by mouth every morning    cholecalciferol (VITAMIN D3) 1000 UNIT tablet   No No   Sig: Take 1 tablet (1,000 Units) by mouth daily   cyclobenzaprine (FLEXERIL) 10 MG tablet  Self No No   Sig: Take 1 tablet (10 mg) by mouth 3 times daily as needed for muscle spasms   fludrocortisone (FLORINEF) 0.1 MG tablet   Yes No   Sig: Take 1 tablet (0.1 mg) by mouth daily   furosemide (LASIX) 20 MG tablet   No No   Sig: Take 2 tablets (40 mg) by mouth 2 times daily   insulin glargine (LANTUS) 100 UNIT/ML injection   Yes No   Sig: Inject 50 Units Subcutaneous every morning   linagliptin (TRADJENTA) 5 MG TABS tablet   Yes No   Sig: Take 5 mg by mouth every evening    magnesium oxide (MAG-OX) 400 (241.3 MG) MG tablet   No No   Sig: Take 1 tablet (400 mg) by mouth daily   mycophenolic acid (GENERIC  EQUIVALENT) 360 MG EC tablet   No No   Sig: Take 2 tablets (720 mg) by mouth every 12 hours   oxyCODONE IR (ROXICODONE) 10 MG tablet   No No   Sig: Take 1 tablet (10 mg) by mouth daily as needed for moderate to severe pain   patiromer (VELTASSA) 8.4 G packet   Yes No   Sig: Take 8.4 g by mouth daily   polyethylene glycol (MIRALAX) powder   No No   Sig: Take 17 g (1 capful) by mouth daily   predniSONE (DELTASONE) 2.5 MG tablet   No No   Sig: Take 1 tablet (2.5 mg) by mouth daily   sodium bicarbonate 650 MG tablet   No No   Sig: Take 1 tablet (650 mg) by mouth 3 times daily   tacrolimus (GENERIC EQUIVALENT) 1 MG capsule   No No   Sig: Take 1 capsule (1 mg) by mouth every 12 hours      Facility-Administered Medications: None     Allergies   Allergies   Allergen Reactions     Erythromycin GI Disturbance     Vioxx      Nausea, vomiting       Physical Exam   Vital Signs: Temp: 100.2  F (37.9  C) Temp src: Oral BP: 145/81 Pulse: 88   Resp: 18 SpO2: 98 %      Weight: 200 lbs 0 oz    General Appearance: Alert and oriented x 3, NAD  Eyes: PERRL  HEENT: Head normocephalic, atraumatic  Respiratory: Lungs CTAB, no wheezing or crackles  Cardiovascular: RRR, no murmurs or gallops  GI: abdomen soft, extremely tender to palpation along L flank, skin taught, erythematous, with small pea sized abscess   Genitourinary: deferred  Skin: warm and dry to touch, erythematous along R flank with warmth  Musculoskeletal: No joint swelling or tenderness  Neurologic: CN II-XII grossly intact  Psychiatric: mood stable    Data   Data reviewed today: I reviewed all medications, new labs and imaging results over the last 24 hours.     Data     Recent Labs  Lab 02/12/18  1904 02/12/18  1232   WBC 5.4 5.4   HGB 7.9* 8.2*   MCV 92 92   * 119*   NA  --  141   POTASSIUM  --  3.8   CHLORIDE  --  107   CO2  --  30   BUN  --  18   CR  --  1.24   ANIONGAP  --  4   JACOB  --  8.2*   GLC  --  222*   ALBUMIN  --  2.6*   PROTTOTAL  --  6.8   BILITOTAL  --   0.5   ALKPHOS  --  155*   ALT  --  18   AST  --  18     Physician Attestation   I, Krishna Archer, saw and evaluated Camacho Bhagat as part of a shared visit.  I have reviewed and discussed with the advanced practice provider their history, physical and plan.    I personally reviewed the vital signs, medications, labs and imaging.    My key history or physical exam findings: takedown ileostomy early January, now with increased pain/swelling RLQ X 3d, low grade temp here (100.5) and brawny edema, erythema warmth with central pustule RLQ.    Key management decisions made by me: Will d/w IR drainage intraabd abscess and work with colorectal surg team.  IV ertapenem.    Krishna Archer  Date of Service (when I saw the patient): 02/12/18

## 2018-02-13 NOTE — SUMMARY OF CARE
Pt arrived to unit 5B from ED on 2/13/18 at 0740. Belongings include pants, shirt, socks, shoes, jacket. Pt refused that I go through valuables, but requested to be kept in room closet.

## 2018-02-13 NOTE — TELEPHONE ENCOUNTER
Spoke with Camacho who reports being admitted this morning, spent the night in the ER.   Expressing frustration with lack of information about plan, is aware that a drain may be placed by IR in his abd.  Redirected Camacho to speak with his nurse about timing of drain, when he would be having it placed.

## 2018-02-13 NOTE — CONSULTS
IR consulted for retroperitoneal drain placement    Case and imaging reviewed with Dr. Holloway from IR. There is no discrete fluid collection that IR can target for percutaneous drain placement.     This was reviewed with Dr. Hunter.    Annalisa Peña, ALMA, APRN  Interventional Radiology   Phone: [phone]  Pager: [phone]

## 2018-02-13 NOTE — PLAN OF CARE
Problem: Patient Care Overview  Goal: Plan of Care/Patient Progress Review  Outcome: No Change  Pt A/O. VSS. Up independently in room. Voiding adequate amounts. IR not planning to place drain at this time. Diet advanced to regular, tolerating well. PRN dilaudid given x 1 prior to diet being ordered. Pain has remained at a tolerable level since this morning. Drsg to old takedown site on abdomen CDI. Swelling to R lower abdomen/hip, abscess white with surrounding erythema. Will continue to monitor.

## 2018-02-14 ENCOUNTER — ANESTHESIA (OUTPATIENT)
Dept: SURGERY | Facility: CLINIC | Age: 53
End: 2018-02-14
Payer: COMMERCIAL

## 2018-02-14 ENCOUNTER — ANESTHESIA EVENT (OUTPATIENT)
Dept: SURGERY | Facility: CLINIC | Age: 53
End: 2018-02-14
Payer: COMMERCIAL

## 2018-02-14 LAB
ABO + RH BLD: NORMAL
ABO + RH BLD: NORMAL
ANION GAP SERPL CALCULATED.3IONS-SCNC: 6 MMOL/L (ref 3–14)
BACTERIA SPEC CULT: NORMAL
BLD GP AB SCN SERPL QL: NORMAL
BLOOD BANK CMNT PATIENT-IMP: NORMAL
BUN SERPL-MCNC: 18 MG/DL (ref 7–30)
CALCIUM SERPL-MCNC: 8.1 MG/DL (ref 8.5–10.1)
CHLORIDE SERPL-SCNC: 102 MMOL/L (ref 94–109)
CO2 SERPL-SCNC: 29 MMOL/L (ref 20–32)
CREAT SERPL-MCNC: 1.16 MG/DL (ref 0.66–1.25)
ERYTHROCYTE [DISTWIDTH] IN BLOOD BY AUTOMATED COUNT: 15.2 % (ref 10–15)
GFR SERPL CREATININE-BSD FRML MDRD: 66 ML/MIN/1.7M2
GLUCOSE BLDC GLUCOMTR-MCNC: 102 MG/DL (ref 70–99)
GLUCOSE BLDC GLUCOMTR-MCNC: 143 MG/DL (ref 70–99)
GLUCOSE BLDC GLUCOMTR-MCNC: 158 MG/DL (ref 70–99)
GLUCOSE BLDC GLUCOMTR-MCNC: 162 MG/DL (ref 70–99)
GLUCOSE BLDC GLUCOMTR-MCNC: 209 MG/DL (ref 70–99)
GLUCOSE SERPL-MCNC: 164 MG/DL (ref 70–99)
GRAM STN SPEC: NORMAL
HCT VFR BLD AUTO: 22.8 % (ref 40–53)
HGB BLD-MCNC: 7.2 G/DL (ref 13.3–17.7)
Lab: NORMAL
Lab: NORMAL
MCH RBC QN AUTO: 28.7 PG (ref 26.5–33)
MCHC RBC AUTO-ENTMCNC: 31.6 G/DL (ref 31.5–36.5)
MCV RBC AUTO: 91 FL (ref 78–100)
PLATELET # BLD AUTO: 100 10E9/L (ref 150–450)
POTASSIUM SERPL-SCNC: 3.5 MMOL/L (ref 3.4–5.3)
RBC # BLD AUTO: 2.51 10E12/L (ref 4.4–5.9)
SODIUM SERPL-SCNC: 137 MMOL/L (ref 133–144)
SPECIMEN EXP DATE BLD: NORMAL
SPECIMEN SOURCE: NORMAL
SPECIMEN SOURCE: NORMAL
TACROLIMUS BLD-MCNC: <3 UG/L (ref 5–15)
TME LAST DOSE: ABNORMAL H
WBC # BLD AUTO: 3.9 10E9/L (ref 4–11)

## 2018-02-14 PROCEDURE — 40000170 ZZH STATISTIC PRE-PROCEDURE ASSESSMENT II: Performed by: COLON & RECTAL SURGERY

## 2018-02-14 PROCEDURE — 87205 SMEAR GRAM STAIN: CPT | Performed by: COLON & RECTAL SURGERY

## 2018-02-14 PROCEDURE — 37000008 ZZH ANESTHESIA TECHNICAL FEE, 1ST 30 MIN: Performed by: COLON & RECTAL SURGERY

## 2018-02-14 PROCEDURE — 0J980ZX DRAINAGE OF ABDOMEN SUBCUTANEOUS TISSUE AND FASCIA, OPEN APPROACH, DIAGNOSTIC: ICD-10-PCS | Performed by: COLON & RECTAL SURGERY

## 2018-02-14 PROCEDURE — 25000131 ZZH RX MED GY IP 250 OP 636 PS 637: Performed by: INTERNAL MEDICINE

## 2018-02-14 PROCEDURE — 80048 BASIC METABOLIC PNL TOTAL CA: CPT | Performed by: INTERNAL MEDICINE

## 2018-02-14 PROCEDURE — 25000132 ZZH RX MED GY IP 250 OP 250 PS 637: Performed by: PHYSICIAN ASSISTANT

## 2018-02-14 PROCEDURE — 80197 ASSAY OF TACROLIMUS: CPT | Performed by: NURSE PRACTITIONER

## 2018-02-14 PROCEDURE — 86900 BLOOD TYPING SEROLOGIC ABO: CPT | Performed by: ANESTHESIOLOGY

## 2018-02-14 PROCEDURE — 00000146 ZZHCL STATISTIC GLUCOSE BY METER IP

## 2018-02-14 PROCEDURE — 27210794 ZZH OR GENERAL SUPPLY STERILE: Performed by: COLON & RECTAL SURGERY

## 2018-02-14 PROCEDURE — 25000131 ZZH RX MED GY IP 250 OP 636 PS 637: Performed by: NURSE PRACTITIONER

## 2018-02-14 PROCEDURE — 25000131 ZZH RX MED GY IP 250 OP 636 PS 637: Performed by: PHYSICIAN ASSISTANT

## 2018-02-14 PROCEDURE — 87070 CULTURE OTHR SPECIMN AEROBIC: CPT | Performed by: COLON & RECTAL SURGERY

## 2018-02-14 PROCEDURE — 87186 SC STD MICRODIL/AGAR DIL: CPT | Performed by: COLON & RECTAL SURGERY

## 2018-02-14 PROCEDURE — 87102 FUNGUS ISOLATION CULTURE: CPT | Performed by: COLON & RECTAL SURGERY

## 2018-02-14 PROCEDURE — 85027 COMPLETE CBC AUTOMATED: CPT | Performed by: INTERNAL MEDICINE

## 2018-02-14 PROCEDURE — 86901 BLOOD TYPING SEROLOGIC RH(D): CPT | Performed by: ANESTHESIOLOGY

## 2018-02-14 PROCEDURE — 86850 RBC ANTIBODY SCREEN: CPT | Performed by: ANESTHESIOLOGY

## 2018-02-14 PROCEDURE — 87077 CULTURE AEROBIC IDENTIFY: CPT | Performed by: COLON & RECTAL SURGERY

## 2018-02-14 PROCEDURE — 25000132 ZZH RX MED GY IP 250 OP 250 PS 637: Performed by: INTERNAL MEDICINE

## 2018-02-14 PROCEDURE — 25000125 ZZHC RX 250: Performed by: NURSE ANESTHETIST, CERTIFIED REGISTERED

## 2018-02-14 PROCEDURE — 25000128 H RX IP 250 OP 636: Performed by: STUDENT IN AN ORGANIZED HEALTH CARE EDUCATION/TRAINING PROGRAM

## 2018-02-14 PROCEDURE — 25000128 H RX IP 250 OP 636: Performed by: PHYSICIAN ASSISTANT

## 2018-02-14 PROCEDURE — 25000125 ZZHC RX 250: Performed by: COLON & RECTAL SURGERY

## 2018-02-14 PROCEDURE — 36415 COLL VENOUS BLD VENIPUNCTURE: CPT | Performed by: INTERNAL MEDICINE

## 2018-02-14 PROCEDURE — 37000009 ZZH ANESTHESIA TECHNICAL FEE, EACH ADDTL 15 MIN: Performed by: COLON & RECTAL SURGERY

## 2018-02-14 PROCEDURE — 36000059 ZZH SURGERY LEVEL 3 EA 15 ADDTL MIN UMMC: Performed by: COLON & RECTAL SURGERY

## 2018-02-14 PROCEDURE — 99233 SBSQ HOSP IP/OBS HIGH 50: CPT | Performed by: INTERNAL MEDICINE

## 2018-02-14 PROCEDURE — 36000057 ZZH SURGERY LEVEL 3 1ST 30 MIN - UMMC: Performed by: COLON & RECTAL SURGERY

## 2018-02-14 PROCEDURE — 25000128 H RX IP 250 OP 636: Performed by: NURSE ANESTHETIST, CERTIFIED REGISTERED

## 2018-02-14 PROCEDURE — 87075 CULTR BACTERIA EXCEPT BLOOD: CPT | Performed by: COLON & RECTAL SURGERY

## 2018-02-14 PROCEDURE — 25000131 ZZH RX MED GY IP 250 OP 636 PS 637: Performed by: STUDENT IN AN ORGANIZED HEALTH CARE EDUCATION/TRAINING PROGRAM

## 2018-02-14 PROCEDURE — 25000128 H RX IP 250 OP 636: Performed by: INTERNAL MEDICINE

## 2018-02-14 PROCEDURE — 12000008 ZZH R&B INTERMEDIATE UMMC

## 2018-02-14 RX ORDER — PROPOFOL 10 MG/ML
INJECTION, EMULSION INTRAVENOUS CONTINUOUS PRN
Status: DISCONTINUED | OUTPATIENT
Start: 2018-02-14 | End: 2018-02-14

## 2018-02-14 RX ORDER — BUPIVACAINE HYDROCHLORIDE AND EPINEPHRINE 5; 5 MG/ML; UG/ML
INJECTION, SOLUTION PERINEURAL PRN
Status: DISCONTINUED | OUTPATIENT
Start: 2018-02-14 | End: 2018-02-14 | Stop reason: HOSPADM

## 2018-02-14 RX ORDER — FENTANYL CITRATE 50 UG/ML
25-50 INJECTION, SOLUTION INTRAMUSCULAR; INTRAVENOUS
Status: CANCELLED | OUTPATIENT
Start: 2018-02-14

## 2018-02-14 RX ORDER — LIDOCAINE HYDROCHLORIDE 20 MG/ML
INJECTION, SOLUTION INFILTRATION; PERINEURAL PRN
Status: DISCONTINUED | OUTPATIENT
Start: 2018-02-14 | End: 2018-02-14

## 2018-02-14 RX ORDER — LIDOCAINE HYDROCHLORIDE 10 MG/ML
INJECTION, SOLUTION INFILTRATION; PERINEURAL PRN
Status: DISCONTINUED | OUTPATIENT
Start: 2018-02-14 | End: 2018-02-14 | Stop reason: HOSPADM

## 2018-02-14 RX ORDER — SODIUM CHLORIDE, SODIUM LACTATE, POTASSIUM CHLORIDE, CALCIUM CHLORIDE 600; 310; 30; 20 MG/100ML; MG/100ML; MG/100ML; MG/100ML
INJECTION, SOLUTION INTRAVENOUS CONTINUOUS
Status: CANCELLED | OUTPATIENT
Start: 2018-02-14

## 2018-02-14 RX ORDER — TACROLIMUS 1 MG/1
2 CAPSULE ORAL
Status: DISCONTINUED | OUTPATIENT
Start: 2018-02-14 | End: 2018-02-15

## 2018-02-14 RX ORDER — ONDANSETRON 2 MG/ML
4 INJECTION INTRAMUSCULAR; INTRAVENOUS EVERY 30 MIN PRN
Status: CANCELLED | OUTPATIENT
Start: 2018-02-14

## 2018-02-14 RX ORDER — NALOXONE HYDROCHLORIDE 0.4 MG/ML
.1-.4 INJECTION, SOLUTION INTRAMUSCULAR; INTRAVENOUS; SUBCUTANEOUS
Status: DISCONTINUED | OUTPATIENT
Start: 2018-02-14 | End: 2018-02-15 | Stop reason: HOSPADM

## 2018-02-14 RX ORDER — SODIUM CHLORIDE, SODIUM LACTATE, POTASSIUM CHLORIDE, CALCIUM CHLORIDE 600; 310; 30; 20 MG/100ML; MG/100ML; MG/100ML; MG/100ML
INJECTION, SOLUTION INTRAVENOUS CONTINUOUS
Status: DISCONTINUED | OUTPATIENT
Start: 2018-02-14 | End: 2018-02-15

## 2018-02-14 RX ORDER — ONDANSETRON 4 MG/1
4 TABLET, ORALLY DISINTEGRATING ORAL EVERY 30 MIN PRN
Status: CANCELLED | OUTPATIENT
Start: 2018-02-14

## 2018-02-14 RX ADMIN — PROPOFOL 100 MCG/KG/MIN: 10 INJECTION, EMULSION INTRAVENOUS at 13:02

## 2018-02-14 RX ADMIN — SODIUM CHLORIDE, POTASSIUM CHLORIDE, SODIUM LACTATE AND CALCIUM CHLORIDE: 600; 310; 30; 20 INJECTION, SOLUTION INTRAVENOUS at 05:55

## 2018-02-14 RX ADMIN — ENOXAPARIN SODIUM 40 MG: 40 INJECTION SUBCUTANEOUS at 08:02

## 2018-02-14 RX ADMIN — GABAPENTIN 300 MG: 300 CAPSULE ORAL at 14:34

## 2018-02-14 RX ADMIN — MYCOPHENOLIC ACID 720 MG: 360 TABLET, DELAYED RELEASE ORAL at 20:25

## 2018-02-14 RX ADMIN — PREDNISONE 2.5 MG: 2.5 TABLET ORAL at 07:25

## 2018-02-14 RX ADMIN — FUROSEMIDE 40 MG: 40 TABLET ORAL at 07:25

## 2018-02-14 RX ADMIN — AMLODIPINE BESYLATE 10 MG: 10 TABLET ORAL at 07:25

## 2018-02-14 RX ADMIN — TACROLIMUS 1 MG: 1 CAPSULE ORAL at 07:25

## 2018-02-14 RX ADMIN — OXYCODONE HYDROCHLORIDE 5 MG: 5 TABLET ORAL at 15:53

## 2018-02-14 RX ADMIN — SODIUM BICARBONATE 650 MG TABLET 650 MG: at 14:34

## 2018-02-14 RX ADMIN — SODIUM BICARBONATE 650 MG TABLET 650 MG: at 20:25

## 2018-02-14 RX ADMIN — SODIUM BICARBONATE 650 MG TABLET 650 MG: at 07:26

## 2018-02-14 RX ADMIN — OXYCODONE HYDROCHLORIDE 5 MG: 5 TABLET ORAL at 22:12

## 2018-02-14 RX ADMIN — OMEPRAZOLE 20 MG: 20 CAPSULE, DELAYED RELEASE ORAL at 07:26

## 2018-02-14 RX ADMIN — SODIUM CHLORIDE, POTASSIUM CHLORIDE, SODIUM LACTATE AND CALCIUM CHLORIDE: 600; 310; 30; 20 INJECTION, SOLUTION INTRAVENOUS at 18:54

## 2018-02-14 RX ADMIN — FUROSEMIDE 40 MG: 40 TABLET ORAL at 14:34

## 2018-02-14 RX ADMIN — MAGNESIUM OXIDE TAB 400 MG (241.3 MG ELEMENTAL MG) 400 MG: 400 (241.3 MG) TAB at 20:28

## 2018-02-14 RX ADMIN — MYCOPHENOLIC ACID 720 MG: 360 TABLET, DELAYED RELEASE ORAL at 07:25

## 2018-02-14 RX ADMIN — LIDOCAINE HYDROCHLORIDE 80 MG: 20 INJECTION, SOLUTION INFILTRATION; PERINEURAL at 13:04

## 2018-02-14 RX ADMIN — INSULIN GLARGINE 12.5 UNITS: 100 INJECTION, SOLUTION SUBCUTANEOUS at 07:25

## 2018-02-14 RX ADMIN — GABAPENTIN 300 MG: 300 CAPSULE ORAL at 20:25

## 2018-02-14 RX ADMIN — TACROLIMUS 1 MG: 1 CAPSULE ORAL at 20:23

## 2018-02-14 RX ADMIN — VITAMIN D, TAB 1000IU (100/BT) 1000 UNITS: 25 TAB at 07:26

## 2018-02-14 RX ADMIN — MIDAZOLAM 2 MG: 1 INJECTION INTRAMUSCULAR; INTRAVENOUS at 12:53

## 2018-02-14 RX ADMIN — ERTAPENEM SODIUM 1 G: 1 INJECTION, POWDER, LYOPHILIZED, FOR SOLUTION INTRAMUSCULAR; INTRAVENOUS at 19:11

## 2018-02-14 RX ADMIN — GABAPENTIN 300 MG: 300 CAPSULE ORAL at 07:26

## 2018-02-14 RX ADMIN — LINAGLIPTIN 5 MG: 5 TABLET, FILM COATED ORAL at 07:32

## 2018-02-14 ASSESSMENT — LIFESTYLE VARIABLES: TOBACCO_USE: 1

## 2018-02-14 ASSESSMENT — PAIN DESCRIPTION - DESCRIPTORS: DESCRIPTORS: CONSTANT

## 2018-02-14 NOTE — ANESTHESIA POSTPROCEDURE EVALUATION
Patient: Camacho Bhagat    Procedure(s):  COMBINED INCISION AND DRAINAGE right ABDOMEN flank - Wound Class: II-Clean Contaminated    Diagnosis:Celulitis  Diagnosis Additional Information: No value filed.    Anesthesia Type:  General, ETT    Note:  Anesthesia Post Evaluation    Patient location during evaluation: Bedside  Patient participation: Able to fully participate in evaluation  Level of consciousness: awake  Pain management: adequate  Airway patency: patent  Cardiovascular status: acceptable  Respiratory status: acceptable  Hydration status: acceptable  PONV: none     Anesthetic complications: None          Last vitals:  Vitals:    02/13/18 2220 02/14/18 0539 02/14/18 0626   BP: 148/77 121/80 144/75   Pulse:  98    Resp: 18 16 16   Temp: 37.3  C (99.2  F) 35.9  C (96.6  F) 36.9  C (98.4  F)   SpO2: 98% 95% 97%         Electronically Signed By: Jersey Villasenor MD  February 14, 2018  1:42 PM

## 2018-02-14 NOTE — ANESTHESIA CARE TRANSFER NOTE
Patient: Camacho Bhagat    Procedure(s):  COMBINED INCISION AND DRAINAGE right ABDOMEN flank - Wound Class: II-Clean Contaminated    Diagnosis: Celulitis  Diagnosis Additional Information: No value filed.    Anesthesia Type:   General, ETT     Note:  Airway :Room Air  Patient transferred to:Medical/Surgical Unit  Comments: Repo Handoff Report: Identifed the Patient, Identified the Reponsible Provider, Reviewed the pertinent medical history, Discussed the surgical course, Reviewed Intra-OP anesthesia mangement and issues during anesthesia, Set expectations for post-procedure period and Allowed opportunity for questions and acknowledgement of understanding      Vitals: (Last set prior to Anesthesia Care Transfer)    CRNA VITALS  2/14/2018 1300 - 2/14/2018 1337      2/14/2018             NIBP: 136/76    Pulse: 77    NIBP Mean: 99                Electronically Signed By: LAMIN Suh CRNA  February 14, 2018  1:37 PM

## 2018-02-14 NOTE — PROGRESS NOTES
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 His post-transplant course was complicated by neutropenic colitis requiring an ileostomy placement in 7/2017, which was then reversed on 1/5/18. Patient was admitted to medicine service with abdominal distention and pain, and was found to have free intraperitoneal air with fluid collection concerning for a postoperative leak vs abscess. Transplant surgery consulted for immunosuppression management.    Graft function: Alk phos mildly elevated at 170, other LFTs and Tbili were normal 2/13.   Immunosuppression management: On Tacrolimus 1mg BID, Myfortic 720mg BID, and Prednisone 2.5mg daily PTA. Tacrolimus levels have been undetectable in recent months, although goal range is 3-5 so unclear how close he has been to goal. Level <3 again, recommend increasing dose to 2mg BID.  Complexity of management: Medium. Contributing factors: active infection   ID:  Suspected abscess.  On ertapenem.      PILLO/Fellow/Resident Provider: Evette Pennington NP  x [phone]    Faculty: Drew Dalal M.D.  __________________________________________________________________  Transplant History:   3/4/2017 (Liver), Postoperative day: 347     Interval History: History is obtained from the patient  Overnight events: Feels tired but otherwise no complaints    ROS:   A 10-point review of systems was negative except as noted above.    Curent Meds:    insulin glargine  12.5 Units Subcutaneous QAM     amLODIPine  10 mg Oral Daily     cholecalciferol  1,000 Units Oral Daily     furosemide  40 mg Oral BID     magnesium oxide  400 mg Oral Daily     omeprazole (priLOSEC) CR capsule 20 mg  20 mg Oral QAM     polyethylene glycol  17 g Oral Daily     predniSONE  2.5 mg Oral Daily     enoxaparin  40 mg Subcutaneous  Q24H     linagliptin  5 mg Oral Daily     tacrolimus  1 mg Oral BID IS     mycophenolic acid  720 mg Oral BID     ertapenem  1 g Intravenous Q24H     gabapentin (NEURONTIN) capsule 300 mg  300 mg Oral TID     sodium bicarbonate  650 mg Oral TID       Physical Exam:     Admit Weight: 90.7 kg (200 lb)    Vital sign ranges:    Temp:  [96.6  F (35.9  C)-99.4  F (37.4  C)] 98.4  F (36.9  C)  Pulse:  [77-98] 98  Heart Rate:  [71-79] 71  Resp:  [16-18] 16  BP: (121-148)/(74-80) 144/75  SpO2:  [95 %-98 %] 97 %    General Appearance: in no apparent distress.   Skin: normal, warm, dry, No rashes, induration, or jaundice.  Heart: well perfused  Lungs: nonlabored breathing on room air  Abdomen: rounded and symmetric, bandages upper abdomen and RLQ  : No Crowe in place, voiding spontaneously.  Extremities: edema: absent.  Neurologic: awake, alert and oriented x4. Tremor absent.    Data:   CMP    Recent Labs  Lab 02/14/18  0600 02/13/18  0523 02/12/18  1232    144 141   POTASSIUM 3.5 3.3* 3.8   CHLORIDE 102 110* 107   CO2 29 26 30   * 128* 222*   BUN 18 16 18   CR 1.16 1.20 1.24   GFRESTIMATED 66 63 61   GFRESTBLACK 80 77 74   JACOB 8.1* 7.8* 8.2*   MAG  --   --  2.1   PHOS  --   --  3.0   ALBUMIN  --  2.4* 2.6*   BILITOTAL  --  0.4 0.5   ALKPHOS  --  170* 155*   AST  --  18 18   ALT  --  14 18     CBC    Recent Labs  Lab 02/14/18  0600 02/13/18  0523   HGB 7.2* 7.2*   WBC 3.9* 5.0   * 93*     COAGS    Recent Labs  Lab 02/13/18  0523   INR 1.05      Urinalysis  Recent Labs   Lab Test  01/31/18   1213  01/29/18   1235   10/27/17   0942   COLOR   --   Yellow   --   Yellow   APPEARANCE   --   Clear   --   Slightly Cloudy   URINEGLC   --   150*   --   Negative   URINEBILI   --   Negative   --   Negative   URINEKETONE   --   Negative   --   Negative   SG   --   1.013   --   1.008   UBLD   --   Small*   --   Negative   URINEPH   --   6.0   --   5.0   PROTEIN   --   >499*   --   10*   NITRITE   --   Negative   --    Negative   LEUKEST   --   Negative   --   Negative   RBCU   --   4*   --   2   WBCU   --   4*   --   6*   UTPG  9.57*  8.51*   < >  0.46*    < > = values in this interval not displayed.     Virology:  CMV DNA Quantitation Specimen   Date Value Ref Range Status   01/07/2018 Plasma, EDTA anticoagulant  Final     Hepatitis C Antibody   Date Value Ref Range Status   03/03/2017  NR Final    Nonreactive   Assay performance characteristics have not been established for newborns,   infants, and children

## 2018-02-14 NOTE — PLAN OF CARE
Problem: Patient Care Overview  Goal: Plan of Care/Patient Progress Review  Outcome: No Change  Pt A&O. VSS on RA. Up independently, calls appropriately. Denies pain/nausea. BG stable, 158. Potassium of 3.3, per pt he refused replacement yesterday due to a history of hyperkalemia. Left PIV, SL. Pre-op check list completed. 1st chlorhexidine shower completed at HS, 2nd to be completed this am. NPO at 0000. Dry gauze dressings to midline incision and right lower abdomen at abscess site. Abscess site has small amount of serosanguineous drainage. Plan for I&D in OR today, tentative add-on for 1000, transport to get patient at 0800.

## 2018-02-14 NOTE — PROGRESS NOTES
[facility], [place]    Internal Medicine Progress Note - Gold Service      Assessment & Plan    Camacho Bhagat is a 52 year old male with a pmh of liver transplant (3/4/17), colon infection (7/2017) s/p takedown ileostomy (1/5/18) and DM II who presented to OSH ED due to abdominal distention, pain      # Abdominal distention, pain 2/2 subacute abscess vs leak following ileostomy reversal (1/5/18). Hx of neutropenic enterocolitis w/ colon perforation requiring subsequent ileostomy in 7/2017 followed by takedown 1/5/18. CT AP w/ contrast on admission showing free intraperitoneal air w/ fluid and air collection in R retroperitoneum concerning for developing infectious process, discrete abscess is not currently evident, findings concerning for bowel leak in the absence of a recent intra-abdominal procedure. Evaluated by colorectal surgery in ED.   Discussed with interventional radiology and colorectal surgery   Plan for I&D today; discussed with surgery the abscess was superficial with no fecal collection or concern for connection  Pending culture; we will continue with IV ertapenem   can be transitioned to oral once we have more data on the culture results  Continue with wound packing and no plans for further I&D    Liver transplant 2/2 liver cancer r/t CASTAÑEDA w/ subsequent cirrhosis (3/4/17). Tacro 1 mg Q12H, prednisone 2.5 mg QD,  mg Q12H.  -Discussed with transplant surgery; we will increase tacrolimus dose to 2 mg twice a day     CKD III. Baseline creatinine ~ 1.1. Recently seen in nephrology clinic 1/31/18. [place] etiology multifactorial (DM, tacro). Cr stable.   - CMP     DM. PTA managed on lantus 50 units qam and tradjenta 5 mg qpm  -we will resume Tradjenta   -Continue Lantus 25 units     HTN. BP stable on admission. PTA managed on lasix 40 mg BID, amlodipine 10 mg qd  - QD weights  - Continue PTA meds     Anemia. Hgb 7.9. Thought 2/2 CKD w/ decreased absorption 2/2 bowel  resection. Denies melena, hematemesis, fatigue.  - CBC     Hx of DVT. W/ permanent IVC filter        Pain management:  # Pain Assessment:     Current Pain Score 2/14/2018 2/14/2018 2/14/2018   Patient currently in pain? yes yes yes   Pain score (0-10) - - -   Pain location Abdomen Abdomen Abdomen   Pain descriptors - Constant Constant   CPOT pain score - - -   -We will discontinue IV hydromorphone and start on oral oxycodone as needed                  Diet: Room Service  Regular Diet Adult  Fluids: Oral  DVT Prophylaxis: Enoxaparin (Lovenox) SQ  Code Status: Full Code    Disposition Plan   Expected discharge: Tomorrow, recommended to prior living arrangement once antibiotic plan established.     Entered: Paulo Hunter 02/14/2018, 4:44 PM   Information in the above section will display in the discharge planner report.      The patient's care was discussed with the Bedside Nurse.    Paulo Hunter  Internal Medicine Staff Hospitalist Service  [facility]  Pager: [phone]  Please see sticky note for cross cover information    Interval History    Feels better after I and D; denies any new symptoms   Last 24 hr care team notes reviewed.   ROS:  4 point ROS including Respiratory, CV, GI and , other than that noted in the HPI, is negative.       Data reviewed today: I reviewed all medications, new labs and imaging results over the last 24 hours. I personally reviewed CT abdomen was reviewed which showed free intraperitoneal fluid and air collection  Physical Exam   Vital Signs: Temp: 98.3  F (36.8  C) Temp src: Oral BP: 131/71 Pulse: 98 Heart Rate: 71 Resp: 16 SpO2: 96 % O2 Device: None (Room air)    Weight: 202 lbs 6.4 oz  General Appearance: Patient looks comfortable on exam  Respiratory: Bilateral equal breath sounds with no added sound   Cardiovascular: S1-S2 with no murmur  GI: Soft, tender, dressing placed  at the site of I&D  Skin:  dressing in place , could not assess  Neuro:AAOx3, b/l UE and  ZELALEM-5/5           Data   Data     Recent Labs  Lab 02/14/18  0600 02/13/18  0523 02/12/18  1904 02/12/18  1232   WBC 3.9* 5.0 5.4 5.4   HGB 7.2* 7.2* 7.9* 8.2*   MCV 91 91 92 92   * 93* 102* 119*   INR  --  1.05  --   --     144  --  141   POTASSIUM 3.5 3.3*  --  3.8   CHLORIDE 102 110*  --  107   CO2 29 26  --  30   BUN 18 16  --  18   CR 1.16 1.20  --  1.24   ANIONGAP 6 9  --  4   JACOB 8.1* 7.8*  --  8.2*   * 128*  --  222*   ALBUMIN  --  2.4*  --  2.6*   PROTTOTAL  --  6.0*  --  6.8   BILITOTAL  --  0.4  --  0.5   ALKPHOS  --  170*  --  155*   ALT  --  14  --  18   AST  --  18  --  18

## 2018-02-14 NOTE — OP NOTE
SURGEON: Lisa Cornelius MD     ASSISTANT: Charles Lynch MD Colorectal Surgery Fellow.    PREOPERATIVE DIAGNOSIS: Right flank abscess at previous drain site    POSTOPERATIVE DIAGNOSIS: Right flank abscess at previous drain site.     PROCEDURE: Examination under anesthesia, incision and drainage of abscess.    INDICATIONS: Camacho Bhagat is a 52 year old male who underwent an emergent right hemicolectomy over 8 months ago. He was treated after this with multiple drains and antibiotics. He presents now with pain and swelling at the drain site and some inflammatory changes in the abdomen. We are draining this area more directly. The risks and benefits of surgery were thoroughly discussed with the patient and he agreed to proceed.     DESCRIPTION OF PROCEDURE: The patient was brought to the operating room, placed in left lateral decubitus position on the operating room table. Deep sedation was induced with intravenous medicines with deep sedation and monitored anesthesia care. We prepped and draped the area in the usual sterile fashion. We began the procedure with a timeout and performed a local block using a mixture 50/50 of bupivacaine and lidocaine with epinephrine. There was an indurated and slightly fluctuant area over the old drain site, this was carefully opened and drained and cultured. The track was moderately deep and narrow. There was no feculant material. Cultures were taken and the area washed out. The site was irrigated out. At the end the case, all instruments and sponge counts were correct x2. The patient was emerged from anesthesia and taken to postoperative anesthesia care unit in good condition.     SPECIMEN: Cultures of wound.     ESTIMATED BLOOD LOSS: Minimal.     DRAINS: None.     URINE OUTPUT: Not measured.     LISA CORNELIUS MD   Colon and Rectal Surgery Staff  [facility]

## 2018-02-14 NOTE — OR NURSING
Dr. Jersey Villasenor at bedside in pre op.  Verbal order with read back obtained for Type and Screen in pre op.

## 2018-02-14 NOTE — BRIEF OP NOTE
[facility], [place]    Brief Operative Note    Pre-operative diagnosis: Celulitis  Post-operative diagnosis Abdominal wall abscess/cellulitis  Procedure: Procedure(s):  COMBINED INCISION AND DRAINAGE right ABDOMEN flank - Wound Class: II-Clean Contaminated  Surgeon: Surgeon(s) and Role:     * Sara Dinh MD - Primary     * Taco Lynch MD - Resident - Assisting  Anesthesia: Monitor Anesthesia Care   Estimated blood loss: Minimal  Drains: None  Specimens:   ID Type Source Tests Collected by Time Destination   1 : right abdomainal wall abcess Fluid Abdomen ANAEROBIC BACTERIAL CULTURE, FLUID CULTURE AEROBIC BACTERIAL, FUNGUS CULTURE, GRAM STAIN Taco Lynch MD 2/14/2018  1:15 PM      Findings:   pustule self drain, indurated soft tissue, no evidence of feculent material. cultures of deep pocket taken.  Complications: None.  Implants: None.

## 2018-02-14 NOTE — ANESTHESIA PREPROCEDURE EVALUATION
Anesthesia Evaluation     . Pt has had prior anesthetic. Type: General    No history of anesthetic complications          ROS/MED HX    ENT/Pulmonary:     (+)tobacco use, Past use , . .   (-) sleep apnea   Neurologic:  - neg neurologic ROS     Cardiovascular:     (+) hypertension----. : . . . :. . Previous cardiac testing date:results:date: results:ECG reviewed date:10/2017 results:SR date: results:          METS/Exercise Tolerance:  >4 METS   Hematologic:     (+) History of blood clots (s/p IVC filter 2001) pt is not anticoagulated, Anemia, History of Transfusion no previous transfusion reaction -      Musculoskeletal:  - neg musculoskeletal ROS       GI/Hepatic: Comment: H/O stage IV liver cancer r/t CASTAÑEDA with subsequent cirrhosis s/p transplant 3/4/2017.  Received chemotherapy and radiation prior to transplant.      (+) GERD Asymptomatic on medication, hepatitis (CASTAÑEDA) type Other, liver disease (s/p liver transplant 3/2017),       Renal/Genitourinary:     (+) chronic renal disease (secondary to DM and anti-rejection therapy), type: CRI, Pt does not require dialysis, Pt has no history of transplant,       Endo:     (+) type II DM .      Psychiatric:     (+) psychiatric history depression      Infectious Disease:   (+) Other Infectious Disease Abdominal abscess vs anastamotic leak      Malignancy:         Other:                     Physical Exam  Normal systems: cardiovascular, pulmonary and dental    Airway   Mallampati: II  TM distance: >3 FB  Neck ROM: full    Dental     Cardiovascular       Pulmonary                     Anesthesia Plan      History & Physical Review  History and physical reviewed and following examination; no interval change.    ASA Status:  3 .    NPO Status:  > 8 hours    Plan for General and ETT with Intravenous induction. Maintenance will be Balanced.    PONV prophylaxis:  Ondansetron (or other 5HT-3)  Additional equipment: 2nd IV      Postoperative Care  Postoperative pain management:   Multi-modal analgesia.      Consents  Anesthetic plan, risks, benefits and alternatives discussed with:  Patient..                          .

## 2018-02-14 NOTE — PROGRESS NOTES
Pt returned to room 5228. VSS. Alert and oriented. C/O minimal pain to R abdomen. New IV pump ordered, will start LR infusion when arrives. Drsg intact to R lower abdomen. Will continue to monitor.

## 2018-02-14 NOTE — PLAN OF CARE
Problem: Patient Care Overview  Goal: Plan of Care/Patient Progress Review  Outcome: No Change  Pt a&o x4, VSS with consistent temp of 99F. Up ad evette and on regular carb count diet. To be NPO at midnight for OR procedure tomorrow AM. Pt reports pain around LRQ abscess. Abscess had purulent drainage at 1800 and sanguinous drainage at 2100. Placed gauze dressing. Left PIV used for 1g Invanz run at 200mL/hr. Pt had a shower and replaced his mid abdominal dressing. Blood glucose of 166 and 168.

## 2018-02-14 NOTE — PLAN OF CARE
Problem: Patient Care Overview  Goal: Plan of Care/Patient Progress Review  Outcome: Therapy, progress toward functional goals as expected  Pt A/O. Went to OR today for I&D and washout of R lower abdomen. Vivek CDI. VSS. Up sba/independent in room. LR ordered, waiting on pump. Will continue to monitor.

## 2018-02-15 VITALS
DIASTOLIC BLOOD PRESSURE: 82 MMHG | HEART RATE: 98 BPM | BODY MASS INDEX: 27.41 KG/M2 | OXYGEN SATURATION: 94 % | HEIGHT: 72 IN | RESPIRATION RATE: 16 BRPM | WEIGHT: 202.4 LBS | SYSTOLIC BLOOD PRESSURE: 151 MMHG | TEMPERATURE: 98.6 F

## 2018-02-15 LAB
ANION GAP SERPL CALCULATED.3IONS-SCNC: 7 MMOL/L (ref 3–14)
BUN SERPL-MCNC: 17 MG/DL (ref 7–30)
CALCIUM SERPL-MCNC: 7.9 MG/DL (ref 8.5–10.1)
CHLORIDE SERPL-SCNC: 106 MMOL/L (ref 94–109)
CO2 SERPL-SCNC: 29 MMOL/L (ref 20–32)
CREAT SERPL-MCNC: 1.08 MG/DL (ref 0.66–1.25)
ERYTHROCYTE [DISTWIDTH] IN BLOOD BY AUTOMATED COUNT: 15.3 % (ref 10–15)
ERYTHROCYTE [DISTWIDTH] IN BLOOD BY AUTOMATED COUNT: NORMAL % (ref 10–15)
GFR SERPL CREATININE-BSD FRML MDRD: 71 ML/MIN/1.7M2
GLUCOSE BLDC GLUCOMTR-MCNC: 142 MG/DL (ref 70–99)
GLUCOSE BLDC GLUCOMTR-MCNC: 142 MG/DL (ref 70–99)
GLUCOSE SERPL-MCNC: 140 MG/DL (ref 70–99)
HCT VFR BLD AUTO: 24 % (ref 40–53)
HCT VFR BLD AUTO: NORMAL % (ref 40–53)
HGB BLD-MCNC: 7.3 G/DL (ref 13.3–17.7)
HGB BLD-MCNC: NORMAL G/DL (ref 13.3–17.7)
MCH RBC QN AUTO: 28.2 PG (ref 26.5–33)
MCH RBC QN AUTO: NORMAL PG (ref 26.5–33)
MCHC RBC AUTO-ENTMCNC: 30.4 G/DL (ref 31.5–36.5)
MCHC RBC AUTO-ENTMCNC: NORMAL G/DL (ref 31.5–36.5)
MCV RBC AUTO: 93 FL (ref 78–100)
MCV RBC AUTO: NORMAL FL (ref 78–100)
MRSA DNA SPEC QL NAA+PROBE: NEGATIVE
PLATELET # BLD AUTO: 99 10E9/L (ref 150–450)
PLATELET # BLD AUTO: 99 10E9/L (ref 150–450)
PLATELET # BLD AUTO: NORMAL 10E9/L (ref 150–450)
POTASSIUM SERPL-SCNC: 3.7 MMOL/L (ref 3.4–5.3)
RBC # BLD AUTO: 2.59 10E12/L (ref 4.4–5.9)
RBC # BLD AUTO: NORMAL 10E12/L (ref 4.4–5.9)
SODIUM SERPL-SCNC: 141 MMOL/L (ref 133–144)
SPECIMEN SOURCE: NORMAL
TACROLIMUS BLD-MCNC: <3 UG/L (ref 5–15)
TME LAST DOSE: ABNORMAL H
WBC # BLD AUTO: 3.6 10E9/L (ref 4–11)
WBC # BLD AUTO: NORMAL 10E9/L (ref 4–11)

## 2018-02-15 PROCEDURE — 85027 COMPLETE CBC AUTOMATED: CPT | Performed by: INTERNAL MEDICINE

## 2018-02-15 PROCEDURE — 00000146 ZZHCL STATISTIC GLUCOSE BY METER IP

## 2018-02-15 PROCEDURE — 36415 COLL VENOUS BLD VENIPUNCTURE: CPT | Performed by: SURGERY

## 2018-02-15 PROCEDURE — 25000131 ZZH RX MED GY IP 250 OP 636 PS 637: Performed by: NURSE PRACTITIONER

## 2018-02-15 PROCEDURE — 99239 HOSP IP/OBS DSCHRG MGMT >30: CPT | Performed by: INTERNAL MEDICINE

## 2018-02-15 PROCEDURE — 87640 STAPH A DNA AMP PROBE: CPT | Performed by: INTERNAL MEDICINE

## 2018-02-15 PROCEDURE — 25000128 H RX IP 250 OP 636: Performed by: PHYSICIAN ASSISTANT

## 2018-02-15 PROCEDURE — 80048 BASIC METABOLIC PNL TOTAL CA: CPT | Performed by: INTERNAL MEDICINE

## 2018-02-15 PROCEDURE — 25000132 ZZH RX MED GY IP 250 OP 250 PS 637: Performed by: INTERNAL MEDICINE

## 2018-02-15 PROCEDURE — 36415 COLL VENOUS BLD VENIPUNCTURE: CPT | Performed by: INTERNAL MEDICINE

## 2018-02-15 PROCEDURE — 25000131 ZZH RX MED GY IP 250 OP 636 PS 637: Performed by: PHYSICIAN ASSISTANT

## 2018-02-15 PROCEDURE — 80197 ASSAY OF TACROLIMUS: CPT | Performed by: SURGERY

## 2018-02-15 PROCEDURE — 25000131 ZZH RX MED GY IP 250 OP 636 PS 637: Performed by: INTERNAL MEDICINE

## 2018-02-15 PROCEDURE — 87641 MR-STAPH DNA AMP PROBE: CPT | Performed by: INTERNAL MEDICINE

## 2018-02-15 PROCEDURE — 25000132 ZZH RX MED GY IP 250 OP 250 PS 637: Performed by: PHYSICIAN ASSISTANT

## 2018-02-15 RX ORDER — OXYCODONE HYDROCHLORIDE 10 MG/1
5 TABLET ORAL EVERY 6 HOURS PRN
Qty: 15 TABLET | Refills: 0 | Status: SHIPPED | OUTPATIENT
Start: 2018-02-15 | End: 2018-05-18

## 2018-02-15 RX ORDER — TACROLIMUS 1 MG/1
1 CAPSULE ORAL
Status: DISCONTINUED | OUTPATIENT
Start: 2018-02-15 | End: 2018-02-15 | Stop reason: HOSPADM

## 2018-02-15 RX ORDER — TACROLIMUS 1 MG/1
2 CAPSULE ORAL EVERY 12 HOURS
Qty: 60 CAPSULE | Refills: 11
Start: 2018-02-15 | End: 2018-02-15

## 2018-02-15 RX ORDER — TACROLIMUS 1 MG/1
1 CAPSULE ORAL EVERY 12 HOURS
Qty: 60 CAPSULE | Refills: 11 | Status: SHIPPED | OUTPATIENT
Start: 2018-02-15 | End: 2018-06-04

## 2018-02-15 RX ADMIN — VITAMIN D, TAB 1000IU (100/BT) 1000 UNITS: 25 TAB at 08:23

## 2018-02-15 RX ADMIN — GABAPENTIN 300 MG: 300 CAPSULE ORAL at 08:24

## 2018-02-15 RX ADMIN — MYCOPHENOLIC ACID 720 MG: 360 TABLET, DELAYED RELEASE ORAL at 08:20

## 2018-02-15 RX ADMIN — ENOXAPARIN SODIUM 40 MG: 40 INJECTION SUBCUTANEOUS at 08:26

## 2018-02-15 RX ADMIN — OMEPRAZOLE 20 MG: 20 CAPSULE, DELAYED RELEASE ORAL at 08:22

## 2018-02-15 RX ADMIN — TACROLIMUS 2 MG: 1 CAPSULE ORAL at 08:35

## 2018-02-15 RX ADMIN — OXYCODONE HYDROCHLORIDE 5 MG: 5 TABLET ORAL at 11:34

## 2018-02-15 RX ADMIN — AMLODIPINE BESYLATE 10 MG: 10 TABLET ORAL at 08:21

## 2018-02-15 RX ADMIN — SODIUM BICARBONATE 650 MG TABLET 650 MG: at 13:26

## 2018-02-15 RX ADMIN — PREDNISONE 2.5 MG: 2.5 TABLET ORAL at 08:22

## 2018-02-15 RX ADMIN — FUROSEMIDE 40 MG: 40 TABLET ORAL at 08:18

## 2018-02-15 RX ADMIN — AMOXICILLIN AND CLAVULANATE POTASSIUM 1 TABLET: 875; 125 TABLET, FILM COATED ORAL at 08:21

## 2018-02-15 RX ADMIN — GABAPENTIN 300 MG: 300 CAPSULE ORAL at 13:26

## 2018-02-15 RX ADMIN — LINAGLIPTIN 5 MG: 5 TABLET, FILM COATED ORAL at 08:23

## 2018-02-15 RX ADMIN — SODIUM BICARBONATE 650 MG TABLET 650 MG: at 08:20

## 2018-02-15 NOTE — PLAN OF CARE
Problem: Patient Care Overview  Goal: Plan of Care/Patient Progress Review  AOx4.  VSS on RA.  Up ad evette and steady on feet.  Pain at incision site- declined interventions.  Dressing: CDI.  IVFM infusing per order.  B.  Pt slept between cares and assessments.  Likely DC today.

## 2018-02-15 NOTE — PROGRESS NOTES
Pt discharged from the floor in wheelchair at 1330. Pt will discharge to home. Discharge complete.

## 2018-02-15 NOTE — DISCHARGE SUMMARY
[facility], [place]    Internal Medicine Discharge Summary- Gold Service    Date of Admission:  2/12/2018  Date of Discharge:  2/15/2018  1:25 PM  Discharging Provider: Paulo Hunter  Discharge Team: Gold 1    Discharge Diagnoses   Abdominal wall abscess  Abdominal distention, pain secondary to subacute abscess versus leak following ileostomy reversal   Liver transplant secondary to liver cancer or nonalcoholic steatohepatitis  CKD stage III  Diabetes mellitus  Hypertension  History of DVT    Follow-ups Needed After Discharge   Follow-up with colorectal and transplant surgery as scheduled  Hospital Course   Camacho Bhagat was admitted on 2/12/2018 for abdominal pain and swelling.    52-year-old male with past medical history of liver transplant in 3/4/17, colitis with takedown ileostomy on 1/5/18 presented with right-sided abdominal pain, redness and swelling.  CT abdomen was performed that showed free intraperitoneal air with fluid and air collection in the right retroperitoneum and concerning for evolving infectious process.  Patient was transferred here as he has his transplant surgery here.  Patient was started on ertapenem.  Colorectal surgery and IR was consulted to drain the abscess.  After assessment the abscesses majorly abdominal wall abscess with brawny induration. Patient underwent I&D 2/14/2018.  he was doing better clinically tolerating diet and afebrile on discharge.  He was transitioned to  amoxicillin clavulanic acid which needs to be continued for 14 days .  Plan to follow with colorectal surgery in a week .  Wound care and dressing will be followed by patient as per instruction. Culture that was sent from the I&D was pending.  MRSA swab was sent which is negative on discharge.  Transplant surgery also followed patient.  Plan to increase tacrolimus to 2 mg twice a day on discharge and rest of his immunosuppressant agent which includes prednisone and mycophenolate  remains the same.       # Discharge Pain Plan: - During his hospitalization, Camacho experienced pain due to abdominal abscess.  The pain plan for discharge was discussed with Camacho and the plan was created in a collaborative fashion.    Plan to give limited supply of oxycodone 5 mg  15 tablets on discharge    CKD III. Baseline creatinine ~ 1.1. Recently seen in nephrology clinic 1/31/18. [place] etiology multifactorial (DM, tacro). Cr stable.         DM. PTA managed on lantus 50 units qam and tradjenta 5 mg qpm  -we will resume Tradjenta   -Continue Lantus 25 units    Resume Lantus 50 units and Tradjenta on discharge      HTN. BP stable on admission. PTA managed on lasix 40 mg BID, amlodipine 10 mg qd  - QD weights  - Continue PTA meds      Anemia. Hgb 7.9. Thought 2/2 CKD w/ decreased absorption 2/2 bowel resection. Denies melena, hematemesis, fatigue.  - CBC      Hx of DVT. W/ permanent IVC filter        Consultations This Hospital Stay   MEDICATION HISTORY IP PHARMACY CONSULT  INTERVENTIONAL RADIOLOGY ADULT/PEDS IP CONSULT  TRANSPLANT SURGERY LIVER ADULT IP CONSULT     Code Status   Full Code    Time Spent on this Encounter   IPaulo, personally saw the patient today and spent greater than 30 minutes discharging this patient.       Paulo Hunter  Internal Medicine Staff Hospitalist Service  [facility]  Pager: [phone]  ______________________________________________________________________    Physical Exam   Vital Signs: Temp: 98.6  F (37  C) Temp src: Oral BP: 151/82   Heart Rate: 78 Resp: 16 SpO2: 94 % O2 Device: None (Room air)    Weight: 202 lbs 6.4 oz    General Appearance: Patient looks comfortable on discharge   Respiratory: bilateral equal breath sounds with no added sound  Cardiovascular: S1-S2 heard with no murmur  GI: Soft nontender bowel sounds heard  Skin: Presence of wound on right side of the abdominal wall post I&D   neuro: Awake, alert and oriented to time place and  person    Significant Results and Procedures   Most Recent 3 BMP's:  Recent Labs   Lab Test  02/15/18   0802  02/14/18   0600  02/13/18   0523   NA  141  137  144   POTASSIUM  3.7  3.5  3.3*   CHLORIDE  106  102  110*   CO2  29  29  26   BUN  17  18  16   CR  1.08  1.16  1.20   ANIONGAP  7  6  9   JACOB  7.9*  8.1*  7.8*   GLC  140*  164*  128*       Pending Results   Unresulted Labs Ordered in the Past 30 Days of this Admission     Date and Time Order Name Status Description    2/14/2018 1315 Fungus Culture, non-blood Preliminary     2/14/2018 1315 Anaerobic bacterial culture Preliminary     2/14/2018 1315 Abscess Culture Aerobic Bacterial Preliminary     2/13/2018 0809 Blood culture Preliminary     2/13/2018 0809 Blood culture Preliminary              Primary Care Physician   Shay GONZALES Sick    Discharge Disposition   Discharged to home  Condition at discharge: Stable    Discharge Orders     Medication Therapy Management Referral     Reason for your hospital stay   You were admitted with abdominal abscess and this was drained by surgery ; please contact us immediately if you have any fever, chills or bleeding from that area  You will need to contact surgery in 2 weeks     Activity   Your activity upon discharge: activity as tolerated     Full Code     Diet   Follow this diet upon discharge: Orders Placed This Encounter     Room Service     Regular Diet Adult       Discharge Medications   Discharge Medication List as of 2/15/2018  1:01 PM      START taking these medications    Details   amoxicillin-clavulanate (AUGMENTIN) 875-125 MG per tablet Take 1 tablet by mouth every 12 hours for 13 days, Disp-26 tablet, R-0, E-Prescribe         CONTINUE these medications which have CHANGED    Details   oxyCODONE IR (ROXICODONE) 10 MG tablet Take 0.5 tablets (5 mg) by mouth every 6 hours as needed for moderate to severe pain, Disp-15 tablet, R-0, Local Print      tacrolimus (GENERIC EQUIVALENT) 1 MG capsule Take 1 capsule (1 mg)  by mouth every 12 hours, Disp-60 capsule, R-11, E-Prescribe         CONTINUE these medications which have NOT CHANGED    Details   predniSONE (DELTASONE) 2.5 MG tablet Take 1 tablet (2.5 mg) by mouth daily, Disp-90 tablet, R-3, E-Prescribe      furosemide (LASIX) 20 MG tablet Take 2 tablets (40 mg) by mouth 2 times daily, Disp-200 tablet, R-3, E-Prescribe      magnesium oxide (MAG-OX) 400 (241.3 MG) MG tablet Take 1 tablet (400 mg) by mouth daily, Disp-30 tablet, R-0, No Print Out      sodium bicarbonate 650 MG tablet Take 1 tablet (650 mg) by mouth 3 times daily, Disp-180 tablet, R-3, No Print Out      polyethylene glycol (MIRALAX) powder Take 17 g (1 capful) by mouth daily, Disp-510 g, R-1, Local Print      cholecalciferol (VITAMIN D3) 1000 UNIT tablet Take 1 tablet (1,000 Units) by mouth daily, Disp-100 tablet, R-3, E-Prescribe      mycophenolic acid (GENERIC EQUIVALENT) 360 MG EC tablet Take 2 tablets (720 mg) by mouth every 12 hours, Disp-120 tablet, R-3, E-Prescribe      amLODIPine (NORVASC) 10 MG tablet Take 1 tablet (10 mg) by mouth daily, Disp-90 tablet, R-3, E-Prescribe      CIALIS 5 MG tablet Take 5 mg by mouth as needed , R-1, EUGENE, Historical      OMEPRAZOLE PO Take 20 mg by mouth every morning , Historical      GABAPENTIN PO Take 300 mg by mouth 3 times daily, Historical      insulin glargine (LANTUS) 100 UNIT/ML injection Inject 50 Units Subcutaneous every morning, Historical      linagliptin (TRADJENTA) 5 MG TABS tablet Take 5 mg by mouth daily , Historical      cyclobenzaprine (FLEXERIL) 10 MG tablet Take 1 tablet (10 mg) by mouth 3 times daily as needed for muscle spasms, Disp-90 tablet, R-0, E-Prescribe      patiromer (VELTASSA) 8.4 G packet Take 8.4 g by mouth daily, Disp-30 each, R-3, HistoricalOn hold for now      fludrocortisone (FLORINEF) 0.1 MG tablet Take 1 tablet (0.1 mg) by mouth daily, Disp-90 tablet, R-3, HistoricalOn hold for now           Allergies   Allergies   Allergen Reactions      Erythromycin GI Disturbance     Vioxx      Nausea, vomiting

## 2018-02-15 NOTE — PROGRESS NOTES
Colorectal Surgery Progress Note    ID: 52 year old with history of liver transplant (3/2017) and ileostomy takedown (1/5/2018) c/b abdominal wall abscess at former drain site now s.p I and D    S: NAEO. Has done wound packing at other sites before. No f./c no n/v    O:     /82 (BP Location: Right arm)  Pulse 98  Temp 98.6  F (37  C) (Oral)  Resp 16  Ht 1.829 m (6')  Wt 91.8 kg (202 lb 6.4 oz)  SpO2 94%  BMI 27.45 kg/m2    NAD  NLB on RA  Soft, much improved tenderness at RLQ, packing in place with SS fluid on nugauze  WWP    Gram stain without organisms, intraop cultures pending NGTD    A/P: 52 year old immunosuppressed for liver transplant 5 weeks s./p ileostomy takedown with abscess at site of former drain without concern of connection to bowel/fistula now POD 1 I and D and recovering well, stable for discharge on oral antibiotics with local wound cares  - Agree with primary team choice Augmentin. Recommend continue for 2 weeks total, including hospital days of antibiotics.  Sent page to Liver transplant team at patient's request to confirm this choice given patient concerns for past problems on antibiotics with electrolyte disturbance. He has close follow up with weekly labs  - We will arrange follow up in one week with NP in colorectal clinic      Seen with fellow, plan confirmed with Staff.    Evette Santana MD  PGY 1 Colorectal Surgery

## 2018-02-15 NOTE — PLAN OF CARE
Problem: Patient Care Overview  Goal: Plan of Care/Patient Progress Review  D: VSS. Afebrile.  Up independently to bathroom.  Complains of pain at right abdominal wound site.  Oxycodone every 6 hours for pain management.  Patient tolerating regular diet.  Abdominal dressing intact. P: Monitor pain and medicate as needed.  Possible discharge to home tomorrow.

## 2018-02-15 NOTE — PLAN OF CARE
Problem: Patient Care Overview  Goal: Plan of Care/Patient Progress Review  Outcome: Adequate for Discharge Date Met: 02/15/18  Writer went over all discharge paper work prior to discharge. Pt is discharging to home and ambulates independently.

## 2018-02-15 NOTE — PROGRESS NOTES
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 His post-transplant course was complicated by neutropenic colitis requiring an ileostomy placement in 7/2017, which was then reversed on 1/5/18. Patient was admitted to medicine service with abdominal distention and pain, and was found to have free intraperitoneal air with fluid collection concerning for a postoperative leak vs abscess. Transplant surgery consulted for immunosuppression management.    Graft function: Alk phos mildly elevated at 170, other LFTs and Tbili were normal 2/13.   Immunosuppression management: On Tacrolimus 1mg BID, Myfortic 720mg BID, and Prednisone 2.5mg daily PTA. Tacrolimus levels have been undetectable in recent months, although goal range is 3-5 so unclear how close he has been to goal. Level has remained <3, and dose was increased to 2mg BID on 2/14. Level 2/15 pending, will discharge on 1mg BID as he has been stable on this dose. Will have levels checked after discharge.  Complexity of management: Medium. Contributing factors: active infection   ID:  Had I/D in the OR per colorectal surgery on 2/14. Will d/c on Augmentin.     PILLO/Fellow/Resident Provider: Felicia Tolliver NP  x [phone]    Faculty: Drew Dalal M.D.  __________________________________________________________________  Transplant History:   3/4/2017 (Liver), Postoperative day: 348     Interval History: History is obtained from the patient  Overnight events: Feeling well today and is hoping to go home. Incision is sore, but overall feeling good. Eating well without nausea.    ROS:   A 10-point review of systems was negative except as noted above.    Curent Meds:    amoxicillin-clavulanate  1 tablet Oral Q12H FRANCO     tacrolimus  2 mg Oral BID IS     insulin glargine  25  Units Subcutaneous QAM     amLODIPine  10 mg Oral Daily     cholecalciferol  1,000 Units Oral Daily     furosemide  40 mg Oral BID     magnesium oxide  400 mg Oral Daily     omeprazole (priLOSEC) CR capsule 20 mg  20 mg Oral QAM     polyethylene glycol  17 g Oral Daily     predniSONE  2.5 mg Oral Daily     enoxaparin  40 mg Subcutaneous Q24H     linagliptin  5 mg Oral Daily     mycophenolic acid  720 mg Oral BID     gabapentin (NEURONTIN) capsule 300 mg  300 mg Oral TID     sodium bicarbonate  650 mg Oral TID       Physical Exam:     Admit Weight: 90.7 kg (200 lb)    Vital sign ranges:    Temp:  [98.2  F (36.8  C)-99.1  F (37.3  C)] 98.6  F (37  C)  Heart Rate:  [71-80] 78  Resp:  [16] 16  BP: (131-151)/(69-84) 151/82  SpO2:  [94 %-99 %] 94 %    General Appearance: in no apparent distress.   Skin: normal, warm, dry, No rashes, induration, or jaundice.  Heart: well perfused  Lungs: nonlabored breathing on room air  Abdomen: rounded and symmetric, bandages upper abdomen and RLQ intact  : No Crowe in place, voiding spontaneously.  Extremities: edema: absent.  Neurologic: awake, alert and oriented x4. Tremor absent.    Data:   CMP    Recent Labs  Lab 02/15/18  0802 02/14/18  0600 02/13/18  0523 02/12/18  1232    137 144 141   POTASSIUM 3.7 3.5 3.3* 3.8   CHLORIDE 106 102 110* 107   CO2 29 29 26 30   * 164* 128* 222*   BUN 17 18 16 18   CR 1.08 1.16 1.20 1.24   GFRESTIMATED 71 66 63 61   GFRESTBLACK 86 80 77 74   JACOB 7.9* 8.1* 7.8* 8.2*   MAG  --   --   --  2.1   PHOS  --   --   --  3.0   ALBUMIN  --   --  2.4* 2.6*   BILITOTAL  --   --  0.4 0.5   ALKPHOS  --   --  170* 155*   AST  --   --  18 18   ALT  --   --  14 18     CBC    Recent Labs  Lab 02/15/18  0550 02/14/18  0600 02/13/18  0523   HGB  --  7.2* 7.2*   WBC  --  3.9* 5.0   PLT 99* 100* 93*     COAGS    Recent Labs  Lab 02/13/18  0523   INR 1.05      Urinalysis  Recent Labs   Lab Test  01/31/18   1213  01/29/18   1235   10/27/17   0942   COLOR   --    Yellow   --   Yellow   APPEARANCE   --   Clear   --   Slightly Cloudy   URINEGLC   --   150*   --   Negative   URINEBILI   --   Negative   --   Negative   URINEKETONE   --   Negative   --   Negative   SG   --   1.013   --   1.008   UBLD   --   Small*   --   Negative   URINEPH   --   6.0   --   5.0   PROTEIN   --   >499*   --   10*   NITRITE   --   Negative   --   Negative   LEUKEST   --   Negative   --   Negative   RBCU   --   4*   --   2   WBCU   --   4*   --   6*   UTPG  9.57*  8.51*   < >  0.46*    < > = values in this interval not displayed.     Virology:  CMV DNA Quantitation Specimen   Date Value Ref Range Status   01/07/2018 Plasma, EDTA anticoagulant  Final     Hepatitis C Antibody   Date Value Ref Range Status   03/03/2017  NR Final    Nonreactive   Assay performance characteristics have not been established for newborns,   infants, and children

## 2018-02-16 ENCOUNTER — CARE COORDINATION (OUTPATIENT)
Dept: CARE COORDINATION | Facility: CLINIC | Age: 53
End: 2018-02-16

## 2018-02-16 ENCOUNTER — TELEPHONE (OUTPATIENT)
Dept: PHARMACY | Facility: OTHER | Age: 53
End: 2018-02-16

## 2018-02-16 ENCOUNTER — CARE COORDINATION (OUTPATIENT)
Dept: SURGERY | Facility: CLINIC | Age: 53
End: 2018-02-16

## 2018-02-16 NOTE — PROGRESS NOTES
Patient was called two times and no answer so post 24 hr DC follow up calls will be closed out since it is Friday, message was left with contact number for department seen by or following up with

## 2018-02-16 NOTE — TELEPHONE ENCOUNTER
MTM referral from: Transitions of Care (recent hospital discharge or ED visit)    MTM referral outreach attempt #1 on February 16, 2018 at 11:08 AM      Outcome: Left Message    Navdeep Wong MTM Coordinator

## 2018-02-16 NOTE — PROGRESS NOTES
RN Post Op Care Coordination Note    POST-OP CALL     Patient is s/p  I&D of Abdominal Abscess    Reports doing well.      Fevers/chills: Patient denies fever/chills.  Eating/drinking: Patient is able to eat and drink without any complaints.   Bowel habits: Patient reports having a normal bowel movement.  Urine output: Voiding without difficulty.   Wound Dressing: Patient states no issues or complications with dressing changes.   Pain: Patient reports pain is controlled.  Narcotics: Oxycodone  Follow up appointment scheduled on Wednesday February 21 at 3:45 pm.  Patient will call with any questions or concerns.

## 2018-02-18 LAB
BACTERIA SPEC CULT: ABNORMAL
BACTERIA SPEC CULT: ABNORMAL
Lab: ABNORMAL
SPECIMEN SOURCE: ABNORMAL

## 2018-02-19 LAB
BACTERIA SPEC CULT: NO GROWTH
BACTERIA SPEC CULT: NO GROWTH
SPECIMEN SOURCE: NORMAL
SPECIMEN SOURCE: NORMAL

## 2018-02-19 NOTE — TELEPHONE ENCOUNTER
MTM referral from: Transitions of Care (recent hospital discharge or ED visit)    MTM referral outreach attempt #2 on February 19, 2018 at 10:29 AM      Outcome: Patient not reachable after several attempts, will route to MTM Pharmacist/Provider as an FYI. Thank you for the referral.     Navdeep Wong, MTM Coordinator

## 2018-02-21 ENCOUNTER — OFFICE VISIT (OUTPATIENT)
Dept: SURGERY | Facility: CLINIC | Age: 53
End: 2018-02-21
Payer: COMMERCIAL

## 2018-02-21 ENCOUNTER — RADIANT APPOINTMENT (OUTPATIENT)
Dept: CT IMAGING | Facility: CLINIC | Age: 53
End: 2018-02-21
Attending: NURSE PRACTITIONER
Payer: COMMERCIAL

## 2018-02-21 VITALS
DIASTOLIC BLOOD PRESSURE: 87 MMHG | HEIGHT: 72 IN | HEART RATE: 78 BPM | SYSTOLIC BLOOD PRESSURE: 158 MMHG | TEMPERATURE: 98.5 F | WEIGHT: 199.6 LBS | OXYGEN SATURATION: 100 % | BODY MASS INDEX: 27.04 KG/M2

## 2018-02-21 DIAGNOSIS — L02.219 CELLULITIS AND ABSCESS OF TRUNK: ICD-10-CM

## 2018-02-21 DIAGNOSIS — L02.219 CELLULITIS AND ABSCESS OF TRUNK: Primary | ICD-10-CM

## 2018-02-21 DIAGNOSIS — L03.319 CELLULITIS AND ABSCESS OF TRUNK: ICD-10-CM

## 2018-02-21 DIAGNOSIS — L03.319 CELLULITIS AND ABSCESS OF TRUNK: Primary | ICD-10-CM

## 2018-02-21 DIAGNOSIS — R80.9 PROTEINURIA: ICD-10-CM

## 2018-02-21 DIAGNOSIS — Z93.2 ILEOSTOMY STATUS (H): ICD-10-CM

## 2018-02-21 DIAGNOSIS — Z09 FOLLOW-UP EXAMINATION FOLLOWING SURGERY: ICD-10-CM

## 2018-02-21 LAB
ALBUMIN SERPL-MCNC: 3.3 G/DL (ref 3.4–5)
ALP SERPL-CCNC: 189 U/L (ref 40–150)
ALT SERPL W P-5'-P-CCNC: 32 U/L (ref 0–70)
ANION GAP SERPL CALCULATED.3IONS-SCNC: 7 MMOL/L (ref 3–14)
AST SERPL W P-5'-P-CCNC: 35 U/L (ref 0–45)
BACTERIA SPEC CULT: ABNORMAL
BACTERIA SPEC CULT: ABNORMAL
BASOPHILS # BLD AUTO: 0 10E9/L (ref 0–0.2)
BASOPHILS NFR BLD AUTO: 0.2 %
BILIRUB SERPL-MCNC: 0.3 MG/DL (ref 0.2–1.3)
BUN SERPL-MCNC: 30 MG/DL (ref 7–30)
CALCIUM SERPL-MCNC: 8.7 MG/DL (ref 8.5–10.1)
CHLORIDE SERPL-SCNC: 103 MMOL/L (ref 94–109)
CO2 SERPL-SCNC: 27 MMOL/L (ref 20–32)
CREAT SERPL-MCNC: 1.35 MG/DL (ref 0.66–1.25)
DIFFERENTIAL METHOD BLD: ABNORMAL
EOSINOPHIL # BLD AUTO: 0.1 10E9/L (ref 0–0.7)
EOSINOPHIL NFR BLD AUTO: 1.4 %
ERYTHROCYTE [DISTWIDTH] IN BLOOD BY AUTOMATED COUNT: 15.5 % (ref 10–15)
GFR SERPL CREATININE-BSD FRML MDRD: 55 ML/MIN/1.7M2
GLUCOSE SERPL-MCNC: 196 MG/DL (ref 70–99)
GRAM STN SPEC: NORMAL
GRAM STN SPEC: NORMAL
HCT VFR BLD AUTO: 29.5 % (ref 40–53)
HGB BLD-MCNC: 9.3 G/DL (ref 13.3–17.7)
IMM GRANULOCYTES # BLD: 0.1 10E9/L (ref 0–0.4)
IMM GRANULOCYTES NFR BLD: 0.9 %
LYMPHOCYTES # BLD AUTO: 1.9 10E9/L (ref 0.8–5.3)
LYMPHOCYTES NFR BLD AUTO: 33.6 %
Lab: ABNORMAL
MCH RBC QN AUTO: 28.9 PG (ref 26.5–33)
MCHC RBC AUTO-ENTMCNC: 31.5 G/DL (ref 31.5–36.5)
MCV RBC AUTO: 92 FL (ref 78–100)
MONOCYTES # BLD AUTO: 0.3 10E9/L (ref 0–1.3)
MONOCYTES NFR BLD AUTO: 5.2 %
NEUTROPHILS # BLD AUTO: 3.4 10E9/L (ref 1.6–8.3)
NEUTROPHILS NFR BLD AUTO: 58.7 %
NRBC # BLD AUTO: 0 10*3/UL
NRBC BLD AUTO-RTO: 0 /100
PLATELET # BLD AUTO: 140 10E9/L (ref 150–450)
POTASSIUM SERPL-SCNC: 5.6 MMOL/L (ref 3.4–5.3)
PROT SERPL-MCNC: 7.6 G/DL (ref 6.8–8.8)
RBC # BLD AUTO: 3.22 10E12/L (ref 4.4–5.9)
SODIUM SERPL-SCNC: 137 MMOL/L (ref 133–144)
SPECIMEN SOURCE: ABNORMAL
SPECIMEN SOURCE: NORMAL
WBC # BLD AUTO: 5.7 10E9/L (ref 4–11)

## 2018-02-21 RX ORDER — IOPAMIDOL 755 MG/ML
150 INJECTION, SOLUTION INTRAVASCULAR ONCE
Status: COMPLETED | OUTPATIENT
Start: 2018-02-21 | End: 2018-02-21

## 2018-02-21 RX ADMIN — IOPAMIDOL 122 ML: 755 INJECTION, SOLUTION INTRAVASCULAR at 16:44

## 2018-02-21 ASSESSMENT — PAIN SCALES - GENERAL: PAINLEVEL: SEVERE PAIN (7)

## 2018-02-21 NOTE — NURSING NOTE
Chief Complaint   Patient presents with     Surgical Followup     Post op visit.        Vitals:    02/21/18 1526   BP: 158/87   BP Location: Left arm   Patient Position: Chair   Cuff Size: Adult Regular   Pulse: 78   Temp: 98.5  F (36.9  C)   TempSrc: Oral   SpO2: 100%   Weight: 199 lb 9.6 oz   Height: 6'       Body mass index is 27.07 kg/(m^2).  Blayne HENRY LPN

## 2018-02-21 NOTE — DISCHARGE INSTRUCTIONS

## 2018-02-21 NOTE — LETTER
2018      RE: Camacho Bhagat  [address] [facility] [address]       Colon and Rectal Surgery Postoperative Clinic Note    RE: Camacho Bhagat  : 1965  BISI: 2018    Camacho Bhagat is a very pleasant 52 year old male with history of liver transplant (3/2017) who underwent emergent extended right hemicolectomy in the setting of an acute abdomen. He underwent ileostomy takedown (2018) c/b abdominal wall abscess at former drain site and is now s/p EUA with I and D on 18 with Dr. Dinh. He was discharged to home on 2/15/18 and presents today for follow up.    Interval history: Camacho reports that the site near his drain is getting larger.  It was initially the size of a golf ball when he left the hospital but is now the size of a baseball.  It is tender but he does not notice any significant increase in pain.  He had drainage yesterday but minimal drainage today.  He denies any fevers or chills.  He is having normal bowel movements.    Assessment/Plan:  On exam he has a firm area in the right lower quadrant near his prior drain site that measures approximately 5 x 10 cm.  This is tender on palpation.  This feels quite deep in the overlying skin is without any erythema.  The prior drain opening has only a small amount of serosanguineous drainage present.  Attempted probing this with a Q-tip without any additional drainage.  Discussed with Dr. Dinh who recommends a repeat CT scan.  If this represents a fluid collection, will discuss with interventional radiology placing another drain.  However, if this represents necrotic tissue, will have him return to the operating room for repeat debridement.  I asked Camacho to remain n.p.o. after midnight in either case.  Will check a CBC and BMP today as well.  He continues on oral Augmentin until .  I cultured this site today to ensure that this is adequate coverage.  Camacho feels systemically well without any fevers, chills,  or increased pain.  Asked him to notify the clinic if he has any worsening symptoms in the meantime. Patient's questions were answered to his stated satisfaction and he is in agreement with this plan.    Medical history:  Past Medical History:   Diagnosis Date     Depressive disorder, not elsewhere classified      Diabetes type 2, controlled (H) 11/10/2016     Esophageal reflux      Fibromyalgia 1/2009    dx with Dr Benitez( Rheum)     Gangrene of finger (H) 8/25/2017     H/O deep venous thrombosis 11/2001    Permanent IVC filter in place.     H/O CASTAÑEDA (nonalcoholic steatohepatitis)      H/O Pneumonia, organism unspecified(486) 10/2001    Included ARDS, sepsis, and  acute renal failure; hospitalized, required tracheostomy placement.     H/O: HTN (hypertension) 11/2001    No longer prescribed antihypertensive medication.       History of hepatocellular carcinoma      History of liver transplant (H)      History of obstructive sleep apnea     No longer wears CPAP since losing approximately 200 pounds with his liver transplant and its complications.       HLD (hyperlipidemia)      Ischemia of both lower extremities 8/25/2017    Distal ischemia due to shock/high pressor requirements     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     Osteoarthritis      Presence of PERMANENT IVC filter      Rheumatoid arthritis(714.0)        Surgical history:  Past Surgical History:   Procedure Laterality Date     BENCH LIVER N/A 3/4/2017    Procedure: BENCH LIVER;  Surgeon: Jovan Tran MD;  Location: [facility] TOTAL KNEE ARTHROPLASTY  2008    Right knee arthroscopy     CHOLECYSTECTOMY       COLONOSCOPY N/A 7/21/2017    Procedure: COMBINED COLONOSCOPY, SINGLE OR MULTIPLE BIOPSY/POLYPECTOMY BY BIOPSY;  Colonoscopy;  Surgeon: Izaiah Montes MD;  Location:  GI     ENT SURGERY      Repair of deviated septum     ESOPHAGOSCOPY, GASTROSCOPY, DUODENOSCOPY (EGD), COMBINED N/A 8/4/2016    Procedure: COMBINED ESOPHAGOSCOPY, GASTROSCOPY,  DUODENOSCOPY (EGD), BIOPSY SINGLE OR MULTIPLE;  Surgeon: Trent Pederson MD;  Location: [facility]     ESOPHAGOSCOPY, GASTROSCOPY, DUODENOSCOPY (EGD), COMBINED N/A 2017    Procedure: COMBINED ESOPHAGOSCOPY, GASTROSCOPY, DUODENOSCOPY (EGD);;  Surgeon: Los Wynn MD;  Location: [facility] GI     INCISION AND DRAINAGE ABDOMEN WASHOUT, COMBINED Right 2018    Procedure: COMBINED INCISION AND DRAINAGE ABDOMEN WASHOUT;  COMBINED INCISION AND DRAINAGE right ABDOMEN flank;  Surgeon: Sara Dinh MD;  Location: [facility] OR     LAPAROTOMY EXPLORATORY N/A 2017    Procedure: LAPAROTOMY EXPLORATORY;  Exploratory Laparotomy, washout;  Surgeon: Tip hZang MD;  Location: [facility] OR     LAPAROTOMY EXPLORATORY N/A 2017    Procedure: LAPAROTOMY EXPLORATORY;  Exploratory Laparotomy, Washout with closure.;  Surgeon: Tip Zhang MD;  Location: [facility] OR     LAPAROTOMY EXPLORATORY N/A 2017    Procedure: LAPAROTOMY EXPLORATORY;  Exploratory Laparotomy, Right angelique-colectomy, end ileostomy, mucosal fistula, partial omentectomy;  Surgeon: Sara Dinh MD;  Location: [facility] OR     ORTHOPEDIC SURGERY Right     Repair of dislocated wrist.       RELEASE TRIGGER FINGER Right 11/10/2017    Procedure: RELEASE TRIGGER FINGER;  Debride, V-Y Flap Right Index Finger;  Surgeon: Momo Noonan MD;  Location: [facility] OR     TAKEDOWN ILEOSTOMY N/A 2018    Procedure: TAKEDOWN ILEOSTOMY;  Exploratory Laparotomy, Lysis of Adhesions, Takedown Of End Ileostomy, Takedown of mucocutaneous fistula, ileocolic resection  And Colorectal Anastomosis, Primary repair of Ventral Hernia, Anesthesia Block ;  Surgeon: Sara Dinh MD;  Location: [facility] OR     TRACHEOSTOMY  2001    During hospitalization for pneumonia.       TRANSPLANT LIVER RECIPIENT  DONOR N/A 3/4/2017    Procedure: TRANSPLANT LIVER RECIPIENT  DONOR;  Surgeon: Jovan Tran MD;  Location: [facility] OR       Problem  list:    Patient Active Problem List    Diagnosis Date Noted     Abscess, intra-abdominal, postoperative (H) 02/13/2018     Priority: Medium     Ileostomy in place (H) 01/05/2018     Priority: Medium     Peristomal skin complication 11/16/2017     Priority: Medium     Painful periwound skin 11/16/2017     Priority: Medium     Encounter for attention to ileostomy (H) 11/16/2017     Priority: Medium     History of creation of ostomy 10/30/2017     Priority: Medium     Elevated serum creatinine 10/16/2017     Priority: Medium     Pancytopenia (H) 08/25/2017     Priority: Medium     Gangrene of finger (H) 08/25/2017     Priority: Medium     Ischemia of both lower extremities 08/25/2017     Priority: Medium     Distal ischemia due to shock/high pressor requirements       S/P liver transplant (H) 07/26/2017     Priority: Medium     Neutropenic colitis (H) 07/04/2017     Priority: Medium     Immunosuppressed status (H) 03/10/2017     Priority: Medium     Liver transplant recipient (H) 03/04/2017     Priority: Medium     Hyperkalemia 02/14/2017     Priority: Medium     Hepatocellular carcinoma (H) 01/27/2016     Priority: Medium     Osteoarthritis 01/18/2015     Priority: Medium     Rheumatoid arthritis (H) 01/18/2015     Priority: Medium     Medication refill- do not delete  11/13/2013     Priority: Medium     Fibromyalgia 08/15/2011     Priority: Medium     Sicca syndrome (H) 08/15/2011     Priority: Medium     Testicular hypofunction      Priority: Medium     Problem list name updated by automated process. Provider to review       Carpal tunnel syndrome 07/08/2002     Priority: Medium       Medications:  Current Outpatient Prescriptions   Medication Sig Dispense Refill     oxyCODONE IR (ROXICODONE) 10 MG tablet Take 0.5 tablets (5 mg) by mouth every 6 hours as needed for moderate to severe pain 15 tablet 0     amoxicillin-clavulanate (AUGMENTIN) 875-125 MG per tablet Take 1 tablet by mouth every 12 hours for 13 days 26  tablet 0     tacrolimus (GENERIC EQUIVALENT) 1 MG capsule Take 1 capsule (1 mg) by mouth every 12 hours 60 capsule 11     patiromer (VELTASSA) 8.4 G packet Take 8.4 g by mouth daily 30 each 3     fludrocortisone (FLORINEF) 0.1 MG tablet Take 1 tablet (0.1 mg) by mouth daily 90 tablet 3     predniSONE (DELTASONE) 2.5 MG tablet Take 1 tablet (2.5 mg) by mouth daily 90 tablet 3     furosemide (LASIX) 20 MG tablet Take 2 tablets (40 mg) by mouth 2 times daily 200 tablet 3     magnesium oxide (MAG-OX) 400 (241.3 MG) MG tablet Take 1 tablet (400 mg) by mouth daily 30 tablet 0     sodium bicarbonate 650 MG tablet Take 1 tablet (650 mg) by mouth 3 times daily 180 tablet 3     polyethylene glycol (MIRALAX) powder Take 17 g (1 capful) by mouth daily 510 g 1     cholecalciferol (VITAMIN D3) 1000 UNIT tablet Take 1 tablet (1,000 Units) by mouth daily 100 tablet 3     mycophenolic acid (GENERIC EQUIVALENT) 360 MG EC tablet Take 2 tablets (720 mg) by mouth every 12 hours 120 tablet 3     amLODIPine (NORVASC) 10 MG tablet Take 1 tablet (10 mg) by mouth daily (Patient taking differently: Take 10 mg by mouth every morning ) 90 tablet 3     CIALIS 5 MG tablet Take 5 mg by mouth as needed   1     OMEPRAZOLE PO Take 20 mg by mouth every morning        GABAPENTIN PO Take 300 mg by mouth 3 times daily       insulin glargine (LANTUS) 100 UNIT/ML injection Inject 50 Units Subcutaneous every morning       linagliptin (TRADJENTA) 5 MG TABS tablet Take 5 mg by mouth daily        cyclobenzaprine (FLEXERIL) 10 MG tablet Take 1 tablet (10 mg) by mouth 3 times daily as needed for muscle spasms 90 tablet 0       Allergies:  Allergies   Allergen Reactions     Erythromycin GI Disturbance     Vioxx      Nausea, vomiting       Family history:  Family History   Problem Relation Age of Onset     DIABETES Father      Hypertension Father      Substance Abuse Father      Arthritis Mother      Thyroid Cancer Mother      Survivor!     Cervical Cancer  Maternal Grandmother      CEREBROVASCULAR DISEASE Maternal Grandfather      Prostate Cancer Paternal Grandfather      Colon Cancer No family hx of      Hyperlipidemia No family hx of      Coronary Artery Disease No family hx of      Breast Cancer No family hx of        Social history:  Social History   Substance Use Topics     Smoking status: Former Smoker     Packs/day: 0.75     Years: 2.00     Quit date: 1988     Smokeless tobacco: Former User     Types: Chew     Quit date: 10/8/2015     Alcohol use No      Comment: No alcohol since liver transplant.       Marital status: .    Nursing Notes:   Yonathan Vasquez LPN  2/21/2018  3:28 PM  Signed  Chief Complaint   Patient presents with     Surgical Followup     Post op visit.        Vitals:    02/21/18 1526   BP: 158/87   BP Location: Left arm   Patient Position: Chair   Cuff Size: Adult Regular   Pulse: 78   Temp: 98.5  F (36.9  C)   TempSrc: Oral   SpO2: 100%   Weight: 199 lb 9.6 oz   Height: 6'       Body mass index is 27.07 kg/(m^2).  Blayne HENRY LPN                        Physical Examination: Exam was chaperoned by Ragini Mays RN  /87 (BP Location: Left arm, Patient Position: Chair, Cuff Size: Adult Regular)  Pulse 78  Temp 98.5  F (36.9  C) (Oral)  Ht 6'  Wt 199 lb 9.6 oz  SpO2 100%  BMI 27.07 kg/m2  General: alert, oriented, in no acute distress, sitting comfortably  HEENT: mucous membranes moist  Abdomen: Induration present in the right lower quadrant measuring 10X5 cm and tender on palpation. Small opening present with only minimal serosanguinous drainage present. Additional small opening in midline with minimal drainage and no surrounding induration.    Total face to face time was 30 minutes, >50% counseling.  This is a postop visit.    LAMIN Queen, NP-C  Colon and Rectal Surgery  [facility]    This note was created using speech recognition software and may contain unintended word  substitutions.        LAMIN Lozano CNP

## 2018-02-21 NOTE — MR AVS SNAPSHOT
After Visit Summary   2/21/2018    Camacho Bhagat    MRN: [id number]           Patient Information     Date Of Birth          1965        Visit Information        Provider Department      2/21/2018 3:45 PM Caryn Parikh APRN [facility] Colon and Rectal Surgery        Today's Diagnoses     Cellulitis and abscess of trunk    -  1    Follow-up examination following surgery        Ileostomy status (H)           Follow-ups after your visit        Your next 10 appointments already scheduled     Feb 21, 2018  4:15 PM CST   LAB with  LAB   [facility])    [address] [place] [id number] [phone]           Please do not eat 10-12 hours before your appointment if you are coming in fasting for labs on lipids, cholesterol, or glucose (sugar). This does not apply to pregnant women. Water, hot tea and black coffee (with nothing added) are okay. Do not drink other fluids, diet soda or chew gum.            Feb 21, 2018  4:40 PM CST   (Arrive by 4:25 PM)   CT ABDOMEN PELVIS W CONTRAST with [id number]   [facility] ([facility])    [address] [place] [id number] [phone]           Please bring any scans or X-rays taken at other hospitals, if similar tests were done. Also bring a list of your medicines, including vitamins, minerals and over-the-counter drugs. It is safest to leave personal items at home.  Be sure to tell your doctor:   If you have any allergies.   If there s any chance you are pregnant.   If you are breastfeeding.  You may have contrast for this exam. To prepare:   Do not eat or drink for 2 hours before your exam. If you need to take medicine, you may take it with small sips of water. (We may ask you to take liquid medicine as well.)   The day before your exam, drink extra fluids at least six 8-ounce glasses (unless your doctor tells you to  restrict your fluids).   You will be given instructions on how to drink the contrast.  Patients over 70 or patients with diabetes or kidney problems:   If you haven t had a blood test (creatinine test) within the last 30 days, the Cardiologist/Radiologist may require you to get this test prior to your exam.  If you have diabetes:   Continue to take your metformin medication on the day of your exam  Please wear loose clothing, such as a sweat suit or jogging clothes. Avoid snaps, zippers and other metal. We may ask you to undress and put on a hospital gown.  If you have any questions, please call the Imaging Department where you will have your exam.            Feb 28, 2018  1:00 PM CST   (Arrive by 12:30 PM)   Return Visit with Cinda Cazares NP   [facility] ([facility])    [address] [place] [address]  [address]  [facility] [id number] [phone]            Mar 02, 2018 10:45 AM CST   (Arrive by 10:30 AM)   Return Liver Transplant with Toñito Camejo MD   [facility] ([facility])    [address] [place] [address]  [address]  [facility] [id number] [phone]            Mar 28, 2018  3:00 PM CDT   (Arrive by 2:30 PM)   New Patient Visit with Tl Higginbotham MD   [facility] ([facility])    [address] [place] [address]  [address]  [facility] [id number] [phone]              Future tests that were ordered for you today     Open Future Orders        Priority Expected Expires Ordered    CBC with platelets differential Routine 2/26/2018 5/22/2018 2/21/2018    Comprehensive metabolic panel Routine  5/22/2018 2/21/2018            Who to contact     Please call your clinic at [phone] to:    Ask questions about your health    Make or cancel appointments    Discuss your medicines    Learn about your test results    Speak to your doctor            Additional Information About Your Visit        MyChart Information      [employer] gives you secure access to your electronic health record. If you see a primary care provider, you can also send messages to your care team and make appointments. If you have questions, please call your primary care clinic.  If you do not have a primary care provider, please call [phone] and they will assist you.      [employer] is an electronic gateway that provides easy, online access to your medical records. With [employer], you can request a clinic appointment, read your test results, renew a prescription or communicate with your care team.     To access your existing account, please contact your [facility] or call [phone] for assistance.        Care EveryWhere ID     This is your Care EveryWhere ID. This could be used by other organizations to access your [facility] medical records  [id number]        Your Vitals Were     Pulse Temperature Height Pulse Oximetry BMI (Body Mass Index)       78 98.5  F (36.9  C) (Oral) 6' 100% 27.07 kg/m2        Blood Pressure from Last 3 Encounters:   02/21/18 158/87   02/15/18 151/82   02/12/18 143/78    Weight from Last 3 Encounters:   02/21/18 199 lb 9.6 oz   02/14/18 202 lb 6.4 oz   02/05/18 222 lb 12.8 oz              We Performed the Following     Gram stain     Wound Culture Aerobic Bacterial          Today's Medication Changes          These changes are accurate as of 2/21/18  4:07 PM.  If you have any questions, ask your nurse or doctor.               These medicines have changed or have updated prescriptions.        Dose/Directions    amLODIPine 10 MG tablet   Commonly known as:  NORVASC   This may have changed:  when to take this   Used for:  HTN (hypertension)        Dose:  10 mg   Take 1 tablet (10 mg) by mouth daily   Quantity:  90 tablet   Refills:  3                Primary Care Provider Office Phone # Fax #    Shay iKrkpatrick [phone]       [address] [place] [address] [address] [place] [phone]        Equal Access  to Services     FRANKLIN PORTILLO : Todd Carlisle, wabrody gaston, qakeshiafranck kajelanidevi acharya. So [facility] [phone].    ATENCIÓN: Si magenla radha, tiene a zheng disposición servicios gratuitos de asistencia lingüística. Llame al [phone].    We comply with applicable federal civil rights laws and [place] laws. We do not discriminate on the basis of race, color, national origin, age, disability, sex, sexual orientation, or gender identity.            Thank you!     Thank you for choosing [facility] COLON AND RECTAL SURGERY  for your care. Our goal is always to provide you with excellent care. Hearing back from our patients is one way we can continue to improve our services. Please take a few minutes to complete the written survey that you may receive in the mail after your visit with us. Thank you!             Your Updated Medication List - Protect others around you: Learn how to safely use, store and throw away your medicines at www.disposemymeds.org.          This list is accurate as of 2/21/18  4:07 PM.  Always use your most recent med list.                   Brand Name Dispense Instructions for use Diagnosis    amLODIPine 10 MG tablet    NORVASC    90 tablet    Take 1 tablet (10 mg) by mouth daily    HTN (hypertension)       amoxicillin-clavulanate 875-125 MG per tablet    AUGMENTIN    26 tablet    Take 1 tablet by mouth every 12 hours for 13 days    Abdominal abscess (H)       cholecalciferol 1000 UNIT tablet    vitamin D3    100 tablet    Take 1 tablet (1,000 Units) by mouth daily    Vitamin D deficiency       CIALIS 5 MG tablet   Generic drug:  tadalafil      Take 5 mg by mouth as needed        cyclobenzaprine 10 MG tablet    FLEXERIL    90 tablet    Take 1 tablet (10 mg) by mouth 3 times daily as needed for muscle spasms    Immunosuppression (H)       fludrocortisone 0.1 MG tablet    FLORINEF    90 tablet    Take 1 tablet (0.1 mg) by mouth daily     Hyperkalemia       furosemide 20 MG tablet    LASIX    200 tablet    Take 2 tablets (40 mg) by mouth 2 times daily    Hyperkalemia       GABAPENTIN PO      Take 300 mg by mouth 3 times daily        insulin glargine 100 UNIT/ML injection    LANTUS     Inject 50 Units Subcutaneous every morning        linagliptin 5 MG Tabs tablet    TRADJENTA     Take 5 mg by mouth daily        magnesium oxide 400 (241.3 MG) MG tablet    MAG-OX    30 tablet    Take 1 tablet (400 mg) by mouth daily    Hypomagnesemia, CKD (chronic kidney disease) stage 3, GFR 30-59 ml/min       mycophenolic acid 360 MG EC tablet    GENERIC EQUIVALENT    120 tablet    Take 2 tablets (720 mg) by mouth every 12 hours    History of liver transplant (H), Long-term use of immunosuppressant medication       OMEPRAZOLE PO      Take 20 mg by mouth every morning        oxyCODONE IR 10 MG tablet    ROXICODONE    15 tablet    Take 0.5 tablets (5 mg) by mouth every 6 hours as needed for moderate to severe pain    Intra-abdominal infection, Abdominal abscess (H)       polyethylene glycol powder    MIRALAX    510 g    Take 17 g (1 capful) by mouth daily    Ileostomy in place (H)       predniSONE 2.5 MG tablet    DELTASONE    90 tablet    Take 1 tablet (2.5 mg) by mouth daily    Liver replaced by transplant (H), Long-term use of immunosuppressant medication       sodium bicarbonate 650 MG tablet     180 tablet    Take 1 tablet (650 mg) by mouth 3 times daily    Hyperkalemia       tacrolimus 1 MG capsule    GENERIC EQUIVALENT    60 capsule    Take 1 capsule (1 mg) by mouth every 12 hours    Liver transplant recipient (H)       VELTASSA 8.4 G packet   Generic drug:  patiromer     30 each    Take 8.4 g by mouth daily    Hyperkalemia

## 2018-02-21 NOTE — PROGRESS NOTES
Colon and Rectal Surgery Postoperative Clinic Note    RE: Camacho Bhagat  : 1965  BISI: 2018    Camacho Bhagat is a very pleasant 52 year old male with history of liver transplant (3/2017) who underwent emergent extended right hemicolectomy in the setting of an acute abdomen. He underwent ileostomy takedown (2018) c/b abdominal wall abscess at former drain site and is now s/p EUA with I and D on 18 with Dr. Dinh. He was discharged to home on 2/15/18 and presents today for follow up.    Interval history: Camacho reports that the site near his drain is getting larger.  It was initially the size of a golf ball when he left the hospital but is now the size of a baseball.  It is tender but he does not notice any significant increase in pain.  He had drainage yesterday but minimal drainage today.  He denies any fevers or chills.  He is having normal bowel movements.    Assessment/Plan:  On exam he has a firm area in the right lower quadrant near his prior drain site that measures approximately 5 x 10 cm.  This is tender on palpation.  This feels quite deep in the overlying skin is without any erythema.  The prior drain opening has only a small amount of serosanguineous drainage present.  Attempted probing this with a Q-tip without any additional drainage.  Discussed with Dr. Dinh who recommends a repeat CT scan.  If this represents a fluid collection, will discuss with interventional radiology placing another drain.  However, if this represents necrotic tissue, will have him return to the operating room for repeat debridement.  I asked Camacho to remain n.p.o. after midnight in either case.  Will check a CBC and BMP today as well.  He continues on oral Augmentin until .  I cultured this site today to ensure that this is adequate coverage.  Camacho feels systemically well without any fevers, chills, or increased pain.  Asked him to notify the clinic if he has any worsening symptoms  in the meantime. Patient's questions were answered to his stated satisfaction and he is in agreement with this plan.    Medical history:  Past Medical History:   Diagnosis Date     Depressive disorder, not elsewhere classified      Diabetes type 2, controlled (H) 11/10/2016     Esophageal reflux      Fibromyalgia 1/2009    dx with Dr Benitez( Rheum)     Gangrene of finger (H) 8/25/2017     H/O deep venous thrombosis 11/2001    Permanent IVC filter in place.     H/O CASTAÑEDA (nonalcoholic steatohepatitis)      H/O Pneumonia, organism unspecified(486) 10/2001    Included ARDS, sepsis, and  acute renal failure; hospitalized, required tracheostomy placement.     H/O: HTN (hypertension) 11/2001    No longer prescribed antihypertensive medication.       History of hepatocellular carcinoma      History of liver transplant (H)      History of obstructive sleep apnea     No longer wears CPAP since losing approximately 200 pounds with his liver transplant and its complications.       HLD (hyperlipidemia)      Ischemia of both lower extremities 8/25/2017    Distal ischemia due to shock/high pressor requirements     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     Osteoarthritis      Presence of PERMANENT IVC filter      Rheumatoid arthritis(714.0)        Surgical history:  Past Surgical History:   Procedure Laterality Date     BENCH LIVER N/A 3/4/2017    Procedure: BENCH LIVER;  Surgeon: Jovan Tran MD;  Location: [facility] TOTAL KNEE ARTHROPLASTY  2008    Right knee arthroscopy     CHOLECYSTECTOMY       COLONOSCOPY N/A 7/21/2017    Procedure: COMBINED COLONOSCOPY, SINGLE OR MULTIPLE BIOPSY/POLYPECTOMY BY BIOPSY;  Colonoscopy;  Surgeon: Izaiah Montes MD;  Location:  GI     ENT SURGERY      Repair of deviated septum     ESOPHAGOSCOPY, GASTROSCOPY, DUODENOSCOPY (EGD), COMBINED N/A 8/4/2016    Procedure: COMBINED ESOPHAGOSCOPY, GASTROSCOPY, DUODENOSCOPY (EGD), BIOPSY SINGLE OR MULTIPLE;  Surgeon: Trent Pederson MD;   Location: [facility] GI     ESOPHAGOSCOPY, GASTROSCOPY, DUODENOSCOPY (EGD), COMBINED N/A 2017    Procedure: COMBINED ESOPHAGOSCOPY, GASTROSCOPY, DUODENOSCOPY (EGD);;  Surgeon: Los Wynn MD;  Location: [facility] GI     INCISION AND DRAINAGE ABDOMEN WASHOUT, COMBINED Right 2018    Procedure: COMBINED INCISION AND DRAINAGE ABDOMEN WASHOUT;  COMBINED INCISION AND DRAINAGE right ABDOMEN flank;  Surgeon: Sara Dinh MD;  Location: [facility] OR     LAPAROTOMY EXPLORATORY N/A 2017    Procedure: LAPAROTOMY EXPLORATORY;  Exploratory Laparotomy, washout;  Surgeon: Tip Zhang MD;  Location: [facility] OR     LAPAROTOMY EXPLORATORY N/A 2017    Procedure: LAPAROTOMY EXPLORATORY;  Exploratory Laparotomy, Washout with closure.;  Surgeon: Tip Zhang MD;  Location: [facility] OR     LAPAROTOMY EXPLORATORY N/A 2017    Procedure: LAPAROTOMY EXPLORATORY;  Exploratory Laparotomy, Right angelique-colectomy, end ileostomy, mucosal fistula, partial omentectomy;  Surgeon: Sara Dinh MD;  Location: [facility] OR     ORTHOPEDIC SURGERY Right     Repair of dislocated wrist.       RELEASE TRIGGER FINGER Right 11/10/2017    Procedure: RELEASE TRIGGER FINGER;  Debride, V-Y Flap Right Index Finger;  Surgeon: Momo Noonan MD;  [facility]     TAKEDOWN ILEOSTOMY N/A 2018    Procedure: TAKEDOWN ILEOSTOMY;  Exploratory Laparotomy, Lysis of Adhesions, Takedown Of End Ileostomy, Takedown of mucocutaneous fistula, ileocolic resection  And Colorectal Anastomosis, Primary repair of Ventral Hernia, Anesthesia Block ;  Surgeon: Sara Dinh MD;  Location: [facility] OR     TRACHEOSTOMY  2001    During hospitalization for pneumonia.       TRANSPLANT LIVER RECIPIENT  DONOR N/A 3/4/2017    Procedure: TRANSPLANT LIVER RECIPIENT  DONOR;  Surgeon: Jovan Tran MD;  Location: [facility] OR       Problem list:    Patient Active Problem List    Diagnosis Date Noted     Abscess,  intra-abdominal, postoperative (H) 02/13/2018     Priority: Medium     Ileostomy in place (H) 01/05/2018     Priority: Medium     Peristomal skin complication 11/16/2017     Priority: Medium     Painful periwound skin 11/16/2017     Priority: Medium     Encounter for attention to ileostomy (H) 11/16/2017     Priority: Medium     History of creation of ostomy 10/30/2017     Priority: Medium     Elevated serum creatinine 10/16/2017     Priority: Medium     Pancytopenia (H) 08/25/2017     Priority: Medium     Gangrene of finger (H) 08/25/2017     Priority: Medium     Ischemia of both lower extremities 08/25/2017     Priority: Medium     Distal ischemia due to shock/high pressor requirements       S/P liver transplant (H) 07/26/2017     Priority: Medium     Neutropenic colitis (H) 07/04/2017     Priority: Medium     Immunosuppressed status (H) 03/10/2017     Priority: Medium     Liver transplant recipient (H) 03/04/2017     Priority: Medium     Hyperkalemia 02/14/2017     Priority: Medium     Hepatocellular carcinoma (H) 01/27/2016     Priority: Medium     Osteoarthritis 01/18/2015     Priority: Medium     Rheumatoid arthritis (H) 01/18/2015     Priority: Medium     Medication refill- do not delete  11/13/2013     Priority: Medium     Fibromyalgia 08/15/2011     Priority: Medium     Sicca syndrome (H) 08/15/2011     Priority: Medium     Testicular hypofunction      Priority: Medium     Problem list name updated by automated process. Provider to review       Carpal tunnel syndrome 07/08/2002     Priority: Medium       Medications:  Current Outpatient Prescriptions   Medication Sig Dispense Refill     oxyCODONE IR (ROXICODONE) 10 MG tablet Take 0.5 tablets (5 mg) by mouth every 6 hours as needed for moderate to severe pain 15 tablet 0     amoxicillin-clavulanate (AUGMENTIN) 875-125 MG per tablet Take 1 tablet by mouth every 12 hours for 13 days 26 tablet 0     tacrolimus (GENERIC EQUIVALENT) 1 MG capsule Take 1 capsule (1  mg) by mouth every 12 hours 60 capsule 11     patiromer (VELTASSA) 8.4 G packet Take 8.4 g by mouth daily 30 each 3     fludrocortisone (FLORINEF) 0.1 MG tablet Take 1 tablet (0.1 mg) by mouth daily 90 tablet 3     predniSONE (DELTASONE) 2.5 MG tablet Take 1 tablet (2.5 mg) by mouth daily 90 tablet 3     furosemide (LASIX) 20 MG tablet Take 2 tablets (40 mg) by mouth 2 times daily 200 tablet 3     magnesium oxide (MAG-OX) 400 (241.3 MG) MG tablet Take 1 tablet (400 mg) by mouth daily 30 tablet 0     sodium bicarbonate 650 MG tablet Take 1 tablet (650 mg) by mouth 3 times daily 180 tablet 3     polyethylene glycol (MIRALAX) powder Take 17 g (1 capful) by mouth daily 510 g 1     cholecalciferol (VITAMIN D3) 1000 UNIT tablet Take 1 tablet (1,000 Units) by mouth daily 100 tablet 3     mycophenolic acid (GENERIC EQUIVALENT) 360 MG EC tablet Take 2 tablets (720 mg) by mouth every 12 hours 120 tablet 3     amLODIPine (NORVASC) 10 MG tablet Take 1 tablet (10 mg) by mouth daily (Patient taking differently: Take 10 mg by mouth every morning ) 90 tablet 3     CIALIS 5 MG tablet Take 5 mg by mouth as needed   1     OMEPRAZOLE PO Take 20 mg by mouth every morning        GABAPENTIN PO Take 300 mg by mouth 3 times daily       insulin glargine (LANTUS) 100 UNIT/ML injection Inject 50 Units Subcutaneous every morning       linagliptin (TRADJENTA) 5 MG TABS tablet Take 5 mg by mouth daily        cyclobenzaprine (FLEXERIL) 10 MG tablet Take 1 tablet (10 mg) by mouth 3 times daily as needed for muscle spasms 90 tablet 0       Allergies:  Allergies   Allergen Reactions     Erythromycin GI Disturbance     Vioxx      Nausea, vomiting       Family history:  Family History   Problem Relation Age of Onset     DIABETES Father      Hypertension Father      Substance Abuse Father      Arthritis Mother      Thyroid Cancer Mother      Survivor!     Cervical Cancer Maternal Grandmother      CEREBROVASCULAR DISEASE Maternal Grandfather       Prostate Cancer Paternal Grandfather      Colon Cancer No family hx of      Hyperlipidemia No family hx of      Coronary Artery Disease No family hx of      Breast Cancer No family hx of        Social history:  Social History   Substance Use Topics     Smoking status: Former Smoker     Packs/day: 0.75     Years: 2.00     Quit date: 1988     Smokeless tobacco: Former User     Types: Chew     Quit date: 10/8/2015     Alcohol use No      Comment: No alcohol since liver transplant.       Marital status: .    Nursing Notes:   Yonathan Vasquez LPN  2/21/2018  3:28 PM  Signed  Chief Complaint   Patient presents with     Surgical Followup     Post op visit.        Vitals:    02/21/18 1526   BP: 158/87   BP Location: Left arm   Patient Position: Chair   Cuff Size: Adult Regular   Pulse: 78   Temp: 98.5  F (36.9  C)   TempSrc: Oral   SpO2: 100%   Weight: 199 lb 9.6 oz   Height: 6'       Body mass index is 27.07 kg/(m^2).  Blayne HENRY LPN                        Physical Examination: Exam was chaperoned by Ragini Mays RN  /87 (BP Location: Left arm, Patient Position: Chair, Cuff Size: Adult Regular)  Pulse 78  Temp 98.5  F (36.9  C) (Oral)  Ht 6'  Wt 199 lb 9.6 oz  SpO2 100%  BMI 27.07 kg/m2  General: alert, oriented, in no acute distress, sitting comfortably  HEENT: mucous membranes moist  Abdomen: Induration present in the right lower quadrant measuring 10X5 cm and tender on palpation. Small opening present with only minimal serosanguinous drainage present. Additional small opening in midline with minimal drainage and no surrounding induration.    Total face to face time was 30 minutes, >50% counseling.  This is a postop visit.    LAMIN Queen, NP-C  Colon and Rectal Surgery  [facility]    This note was created using speech recognition software and may contain unintended word substitutions.

## 2018-02-23 ENCOUNTER — TELEPHONE (OUTPATIENT)
Dept: NEPHROLOGY | Facility: CLINIC | Age: 53
End: 2018-02-23

## 2018-02-23 ENCOUNTER — TELEPHONE (OUTPATIENT)
Dept: TRANSPLANT | Facility: CLINIC | Age: 53
End: 2018-02-23

## 2018-02-23 NOTE — TELEPHONE ENCOUNTER
"Spoke with pt, states he's been eating high potassium foods - leafy greens, orange juice, potatoes. He is not taking Valtassa. Also states he was told by nephrology to eat high potassium foods to \"see what happens.\" Asked him to have labs done next week, says he was planning to get them on Monday.  "

## 2018-02-23 NOTE — TELEPHONE ENCOUNTER
Reviewed below information and labs with Cele MORALES LPN who has been communication with Camacho and Jovanna Foster NP about his potassium. She will follow up with patient. Agree with rechecking labs next week.    Quang Conteh RN

## 2018-02-23 NOTE — TELEPHONE ENCOUNTER
"Verified with patient, he has not been taking Veltassa and Florinef as discussed earlier in Feb (updated to MAR this time to reflect this). Also patient repots that he was not eating or drinking too much K rich foods in his diet and states that his \"potassium was fine before he was in the hospital\". Reviewed with Jovanna, per Jovanna, patient does not need to increase K foods in hid diet. Patient states he is well aware of this. Patient reports that he will have labs recheked on Monday.  Cele Mancilla LPN  Nephrology  [phone]    "

## 2018-02-23 NOTE — TELEPHONE ENCOUNTER
Please call Camacho, to review with him K = 5.6?   Ask him to recheck labs early next week, and if still taking Valtassa??

## 2018-02-24 LAB
BACTERIA SPEC CULT: ABNORMAL
BACTERIA SPEC CULT: ABNORMAL
SPECIMEN SOURCE: ABNORMAL

## 2018-02-25 DIAGNOSIS — L08.9 LOCAL INFECTION OF SKIN AND SUBCUTANEOUS TISSUE: Primary | ICD-10-CM

## 2018-02-25 RX ORDER — LINEZOLID 600 MG/1
600 TABLET, FILM COATED ORAL 2 TIMES DAILY
Qty: 28 TABLET | Refills: 0 | Status: SHIPPED | OUTPATIENT
Start: 2018-02-25 | End: 2018-03-11

## 2018-02-26 ENCOUNTER — TELEPHONE (OUTPATIENT)
Dept: TRANSPLANT | Facility: CLINIC | Age: 53
End: 2018-02-26

## 2018-02-26 ENCOUNTER — OFFICE VISIT (OUTPATIENT)
Dept: SURGERY | Facility: CLINIC | Age: 53
End: 2018-02-26
Payer: COMMERCIAL

## 2018-02-26 VITALS
SYSTOLIC BLOOD PRESSURE: 160 MMHG | DIASTOLIC BLOOD PRESSURE: 93 MMHG | OXYGEN SATURATION: 100 % | BODY MASS INDEX: 26.78 KG/M2 | WEIGHT: 197.7 LBS | TEMPERATURE: 98.8 F | HEIGHT: 72 IN | HEART RATE: 90 BPM

## 2018-02-26 DIAGNOSIS — R80.9 PROTEINURIA: ICD-10-CM

## 2018-02-26 DIAGNOSIS — N18.30 ANEMIA IN STAGE 3 CHRONIC KIDNEY DISEASE (H): ICD-10-CM

## 2018-02-26 DIAGNOSIS — N18.30 CKD (CHRONIC KIDNEY DISEASE) STAGE 3, GFR 30-59 ML/MIN (H): ICD-10-CM

## 2018-02-26 DIAGNOSIS — E87.5 HYPERPOTASSEMIA: ICD-10-CM

## 2018-02-26 DIAGNOSIS — N18.4 CHRONIC KIDNEY DISEASE, STAGE 4 (SEVERE) (H): ICD-10-CM

## 2018-02-26 DIAGNOSIS — R79.89 ELEVATED SERUM CREATININE: ICD-10-CM

## 2018-02-26 DIAGNOSIS — D63.1 ANEMIA IN STAGE 3 CHRONIC KIDNEY DISEASE (H): ICD-10-CM

## 2018-02-26 DIAGNOSIS — L08.9 LOCAL INFECTION OF SKIN AND SUBCUTANEOUS TISSUE: ICD-10-CM

## 2018-02-26 DIAGNOSIS — R79.89 ELEVATED SERUM CREATININE: Primary | ICD-10-CM

## 2018-02-26 DIAGNOSIS — Z94.4 LIVER REPLACED BY TRANSPLANT (H): ICD-10-CM

## 2018-02-26 DIAGNOSIS — Z09 FOLLOW-UP EXAMINATION FOLLOWING SURGERY: Primary | ICD-10-CM

## 2018-02-26 LAB
ALBUMIN SERPL-MCNC: 3.5 G/DL (ref 3.4–5)
ALBUMIN UR-MCNC: 100 MG/DL
ALP SERPL-CCNC: 180 U/L (ref 40–150)
ALT SERPL W P-5'-P-CCNC: 40 U/L (ref 0–70)
ANION GAP SERPL CALCULATED.3IONS-SCNC: 6 MMOL/L (ref 3–14)
APPEARANCE UR: CLEAR
AST SERPL W P-5'-P-CCNC: 41 U/L (ref 0–45)
BACTERIA #/AREA URNS HPF: ABNORMAL /HPF
BILIRUB DIRECT SERPL-MCNC: 0.1 MG/DL (ref 0–0.2)
BILIRUB SERPL-MCNC: 0.3 MG/DL (ref 0.2–1.3)
BILIRUB UR QL STRIP: NEGATIVE
BUN SERPL-MCNC: 37 MG/DL (ref 7–30)
CALCIUM SERPL-MCNC: 8.5 MG/DL (ref 8.5–10.1)
CHLORIDE SERPL-SCNC: 102 MMOL/L (ref 94–109)
CHOLEST SERPL-MCNC: 117 MG/DL
CO2 SERPL-SCNC: 26 MMOL/L (ref 20–32)
COLOR UR AUTO: YELLOW
CREAT SERPL-MCNC: 1.4 MG/DL (ref 0.66–1.25)
CREAT UR-MCNC: 51 MG/DL
ERYTHROCYTE [DISTWIDTH] IN BLOOD BY AUTOMATED COUNT: 16.6 % (ref 10–15)
GFR SERPL CREATININE-BSD FRML MDRD: 53 ML/MIN/1.7M2
GLUCOSE SERPL-MCNC: 283 MG/DL (ref 70–99)
GLUCOSE UR STRIP-MCNC: >499 MG/DL
HCT VFR BLD AUTO: 28.9 % (ref 40–53)
HDLC SERPL-MCNC: 30 MG/DL
HGB BLD-MCNC: 9.6 G/DL (ref 13.3–17.7)
HGB UR QL STRIP: NEGATIVE
HYALINE CASTS #/AREA URNS LPF: 6 /LPF (ref 0–2)
KETONES UR STRIP-MCNC: NEGATIVE MG/DL
LDLC SERPL CALC-MCNC: 29 MG/DL
LEUKOCYTE ESTERASE UR QL STRIP: NEGATIVE
MAGNESIUM SERPL-MCNC: 2.4 MG/DL (ref 1.6–2.3)
MCH RBC QN AUTO: 30.3 PG (ref 26.5–33)
MCHC RBC AUTO-ENTMCNC: 33.2 G/DL (ref 31.5–36.5)
MCV RBC AUTO: 91 FL (ref 78–100)
MUCOUS THREADS #/AREA URNS LPF: PRESENT /LPF
NITRATE UR QL: NEGATIVE
NONHDLC SERPL-MCNC: 87 MG/DL
PH UR STRIP: 5 PH (ref 5–7)
PHOSPHATE SERPL-MCNC: 3.5 MG/DL (ref 2.5–4.5)
PLATELET # BLD AUTO: 107 10E9/L (ref 150–450)
POTASSIUM SERPL-SCNC: 5.4 MMOL/L (ref 3.4–5.3)
PROT SERPL-MCNC: 7.5 G/DL (ref 6.8–8.8)
PROT UR-MCNC: 1.82 G/L
PROT/CREAT 24H UR: 3.59 G/G CR (ref 0–0.2)
RBC # BLD AUTO: 3.17 10E12/L (ref 4.4–5.9)
RBC #/AREA URNS AUTO: 2 /HPF (ref 0–2)
SODIUM SERPL-SCNC: 134 MMOL/L (ref 133–144)
SOURCE: ABNORMAL
SP GR UR STRIP: 1.01 (ref 1–1.03)
TACROLIMUS BLD-MCNC: <3 UG/L (ref 5–15)
TME LAST DOSE: ABNORMAL H
TRIGL SERPL-MCNC: 293 MG/DL
UROBILINOGEN UR STRIP-MCNC: 0 MG/DL (ref 0–2)
WBC # BLD AUTO: 5.5 10E9/L (ref 4–11)
WBC #/AREA URNS AUTO: 1 /HPF (ref 0–2)

## 2018-02-26 ASSESSMENT — PAIN SCALES - GENERAL: PAINLEVEL: NO PAIN (0)

## 2018-02-26 NOTE — PROGRESS NOTES
Colon and Rectal Surgery Postoperative Clinic Note    RE: Camacho Bhagat  : 1965  BISI: 2018    Camacho Bhagat is a very pleasant 52 year old male with history of liver transplant (3/2017) who underwent emergent extended right hemicolectomy in the setting of an acute abdomen. He underwent ileostomy takedown (2018) c/b abdominal wall abscess at former drain site and is now s/p EUA with I and D on 18 with Dr. Dinh. He was discharged to home on 2/15/18 and was seen in clinic last week with right abdominal firm lesion. He presents today for follow up.    Interval history: Camacho reports that the site near his prior drain is remaining stable. It is slightly tender but not very painful. He does not feel is has changed in size from last week. He has no drainage at that site. He continues to deny any fevers or chills.    Assessment/Plan:  On exam he has a firm area in the right lower quadrant near his prior drain site that is actually more movable and less firm than last week. External opening has closed. Minimal tendernes on palpation.  WBC WNL last week and no fevers or chills. CT did not show any fluid collection but there was thickening and edema of the abdominal wall which looked stable. Culture showed streptococcus constellatus and enterococcus faecium susceptible to Linezolid. Will start him on linezolid and follow up in one week to ensure he is improving. Asked him not to take his flexaril while he is on linezolid. Asked him to contact the clinic in the meantime if he has any worsening symptoms. Patient's questions were answered to his stated satisfaction and he is in agreement with this plan.    Medical history:  Past Medical History:   Diagnosis Date     Depressive disorder, not elsewhere classified      Diabetes type 2, controlled (H) 11/10/2016     Esophageal reflux      Fibromyalgia 2009    dx with Dr Benitez( Rheum)     Gangrene of finger (H) 2017     H/O deep venous thrombosis  11/2001    Permanent IVC filter in place.     H/O CASTAÑEDA (nonalcoholic steatohepatitis)      H/O Pneumonia, organism unspecified(486) 10/2001    Included ARDS, sepsis, and  acute renal failure; hospitalized, required tracheostomy placement.     H/O: HTN (hypertension) 11/2001    No longer prescribed antihypertensive medication.       History of hepatocellular carcinoma      History of liver transplant (H)      History of obstructive sleep apnea     No longer wears CPAP since losing approximately 200 pounds with his liver transplant and its complications.       HLD (hyperlipidemia)      Ischemia of both lower extremities 8/25/2017    Distal ischemia due to shock/high pressor requirements     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     Osteoarthritis      Presence of PERMANENT IVC filter      Rheumatoid arthritis(714.0)        Surgical history:  Past Surgical History:   Procedure Laterality Date     BENCH LIVER N/A 3/4/2017    Procedure: BENCH LIVER;  Surgeon: Jovan Tran MD;  Location: [facility] TOTAL KNEE ARTHROPLASTY  2008    Right knee arthroscopy     CHOLECYSTECTOMY       COLONOSCOPY N/A 7/21/2017    Procedure: COMBINED COLONOSCOPY, SINGLE OR MULTIPLE BIOPSY/POLYPECTOMY BY BIOPSY;  Colonoscopy;  Surgeon: Izaiah Montes MD;  Location:  GI     ENT SURGERY      Repair of deviated septum     ESOPHAGOSCOPY, GASTROSCOPY, DUODENOSCOPY (EGD), COMBINED N/A 8/4/2016    Procedure: COMBINED ESOPHAGOSCOPY, GASTROSCOPY, DUODENOSCOPY (EGD), BIOPSY SINGLE OR MULTIPLE;  Surgeon: Trent Pederson MD;  Location:  GI     ESOPHAGOSCOPY, GASTROSCOPY, DUODENOSCOPY (EGD), COMBINED N/A 8/27/2017    Procedure: COMBINED ESOPHAGOSCOPY, GASTROSCOPY, DUODENOSCOPY (EGD);;  Surgeon: Los Wynn MD;  Location:  GI     INCISION AND DRAINAGE ABDOMEN WASHOUT, COMBINED Right 2/14/2018    Procedure: COMBINED INCISION AND DRAINAGE ABDOMEN WASHOUT;  COMBINED INCISION AND DRAINAGE right ABDOMEN flank;  Surgeon:  Sara Dinh MD;  Location: [facility]     LAPAROTOMY EXPLORATORY N/A 2017    Procedure: LAPAROTOMY EXPLORATORY;  Exploratory Laparotomy, washout;  Surgeon: Tip Zhang MD;  [facility]: [facility]     LAPAROTOMY EXPLORATORY N/A 2017    Procedure: LAPAROTOMY EXPLORATORY;  Exploratory Laparotomy, Washout with closure.;  Surgeon: Tip Zhang MD;  [facility]     LAPAROTOMY EXPLORATORY N/A 2017    Procedure: LAPAROTOMY EXPLORATORY;  Exploratory Laparotomy, Right angelique-colectomy, end ileostomy, mucosal fistula, partial omentectomy;  Surgeon: Sara Dinh MD;  Location: [facility] OR     ORTHOPEDIC SURGERY Right     Repair of dislocated wrist.       RELEASE TRIGGER FINGER Right 11/10/2017    Procedure: RELEASE TRIGGER FINGER;  Debride, V-Y Flap Right Index Finger;  Surgeon: Momo Noonan MD;  [facility]     TAKEDOWN ILEOSTOMY N/A 2018    Procedure: TAKEDOWN ILEOSTOMY;  Exploratory Laparotomy, Lysis of Adhesions, Takedown Of End Ileostomy, Takedown of mucocutaneous fistula, ileocolic resection  And Colorectal Anastomosis, Primary repair of Ventral Hernia, Anesthesia Block ;  Surgeon: Sara Dinh MD;  Location: [facility]     TRACHEOSTOMY  2001    During hospitalization for pneumonia.       TRANSPLANT LIVER RECIPIENT  DONOR N/A 3/4/2017    Procedure: TRANSPLANT LIVER RECIPIENT  DONOR;  Surgeon: Jovan Tran MD;  Location: [facility] OR       Problem list:    Patient Active Problem List    Diagnosis Date Noted     Abscess, intra-abdominal, postoperative (H) 2018     Priority: Medium     Ileostomy in place (H) 2018     Priority: Medium     Peristomal skin complication 2017     Priority: Medium     Painful periwound skin 2017     Priority: Medium     Encounter for attention to ileostomy (H) 2017     Priority: Medium     History of creation of ostomy 10/30/2017     Priority: Medium     Elevated serum creatinine  10/16/2017     Priority: Medium     Pancytopenia (H) 08/25/2017     Priority: Medium     Gangrene of finger (H) 08/25/2017     Priority: Medium     Ischemia of both lower extremities 08/25/2017     Priority: Medium     Distal ischemia due to shock/high pressor requirements       S/P liver transplant (H) 07/26/2017     Priority: Medium     Neutropenic colitis (H) 07/04/2017     Priority: Medium     Immunosuppressed status (H) 03/10/2017     Priority: Medium     Liver transplant recipient (H) 03/04/2017     Priority: Medium     Hyperkalemia 02/14/2017     Priority: Medium     Hepatocellular carcinoma (H) 01/27/2016     Priority: Medium     Osteoarthritis 01/18/2015     Priority: Medium     Rheumatoid arthritis (H) 01/18/2015     Priority: Medium     Medication refill- do not delete  11/13/2013     Priority: Medium     Fibromyalgia 08/15/2011     Priority: Medium     Sicca syndrome (H) 08/15/2011     Priority: Medium     Testicular hypofunction      Priority: Medium     Problem list name updated by automated process. Provider to review       Carpal tunnel syndrome 07/08/2002     Priority: Medium       Medications:  Current Outpatient Prescriptions   Medication Sig Dispense Refill     linezolid (ZYVOX) 600 MG tablet Take 1 tablet (600 mg) by mouth 2 times daily for 14 days 28 tablet 0     oxyCODONE IR (ROXICODONE) 10 MG tablet Take 0.5 tablets (5 mg) by mouth every 6 hours as needed for moderate to severe pain 15 tablet 0     amoxicillin-clavulanate (AUGMENTIN) 875-125 MG per tablet Take 1 tablet by mouth every 12 hours for 13 days 26 tablet 0     tacrolimus (GENERIC EQUIVALENT) 1 MG capsule Take 1 capsule (1 mg) by mouth every 12 hours 60 capsule 11     predniSONE (DELTASONE) 2.5 MG tablet Take 1 tablet (2.5 mg) by mouth daily 90 tablet 3     furosemide (LASIX) 20 MG tablet Take 2 tablets (40 mg) by mouth 2 times daily 200 tablet 3     magnesium oxide (MAG-OX) 400 (241.3 MG) MG tablet Take 1 tablet (400 mg) by mouth  daily 30 tablet 0     sodium bicarbonate 650 MG tablet Take 1 tablet (650 mg) by mouth 3 times daily 180 tablet 3     polyethylene glycol (MIRALAX) powder Take 17 g (1 capful) by mouth daily 510 g 1     cholecalciferol (VITAMIN D3) 1000 UNIT tablet Take 1 tablet (1,000 Units) by mouth daily 100 tablet 3     mycophenolic acid (GENERIC EQUIVALENT) 360 MG EC tablet Take 2 tablets (720 mg) by mouth every 12 hours 120 tablet 3     amLODIPine (NORVASC) 10 MG tablet Take 1 tablet (10 mg) by mouth daily (Patient taking differently: Take 10 mg by mouth every morning ) 90 tablet 3     CIALIS 5 MG tablet Take 5 mg by mouth as needed   1     OMEPRAZOLE PO Take 20 mg by mouth every morning        GABAPENTIN PO Take 300 mg by mouth 3 times daily       insulin glargine (LANTUS) 100 UNIT/ML injection Inject 50 Units Subcutaneous every morning       linagliptin (TRADJENTA) 5 MG TABS tablet Take 5 mg by mouth daily        cyclobenzaprine (FLEXERIL) 10 MG tablet Take 1 tablet (10 mg) by mouth 3 times daily as needed for muscle spasms 90 tablet 0       Allergies:  Allergies   Allergen Reactions     Erythromycin GI Disturbance     Vioxx      Nausea, vomiting       Family history:  Family History   Problem Relation Age of Onset     DIABETES Father      Hypertension Father      Substance Abuse Father      Arthritis Mother      Thyroid Cancer Mother      Survivor!     Cervical Cancer Maternal Grandmother      CEREBROVASCULAR DISEASE Maternal Grandfather      Prostate Cancer Paternal Grandfather      Colon Cancer No family hx of      Hyperlipidemia No family hx of      Coronary Artery Disease No family hx of      Breast Cancer No family hx of        Social history:  Social History   Substance Use Topics     Smoking status: Former Smoker     Packs/day: 0.75     Years: 2.00     Quit date: 1988     Smokeless tobacco: Former User     Types: Chew     Quit date: 10/8/2015     Alcohol use No      Comment: No alcohol since liver transplant.        Marital status: .    Nursing Notes:   Abril Sinha LPN  2/26/2018 10:44 AM  Signed  Chief Complaint   Patient presents with     Surgical Followup     post op       Vitals:    02/26/18 1043   BP: (!) 160/93   Pulse: 90   Temp: 98.8  F (37.1  C)   TempSrc: Oral   SpO2: 100%   Weight: 197 lb 11.2 oz   Height: 6'       Body mass index is 26.81 kg/(m^2).    Abril WATSON LPN                             Physical Examination: Exam was chaperoned by Ragini Mays RN  BP (!) 160/93  Pulse 90  Temp 98.8  F (37.1  C) (Oral)  Ht 6'  Wt 197 lb 11.2 oz  SpO2 100%  BMI 26.81 kg/m2  General: alert, oriented, in no acute distress, sitting comfortably  HEENT: mucous membranes moist  Abdomen: Induration present in the right lower quadrant at the prior drain site. This is fairly movable and without erythema. Small opening now closed. Additional small opening in midline with minimal drainage and no surrounding induration.    Total face to face time was 15 minutes, >50% counseling.  This is a postop visit.    LAMIN Queen, NP-C  Colon and Rectal Surgery  [facility]    This note was created using speech recognition software and may contain unintended word substitutions.

## 2018-02-26 NOTE — LETTER
2018      RE: Camacho Bhagat  [address] [facility] [id number]       Colon and Rectal Surgery Postoperative Clinic Note    RE: Camacho Bhagat  : 1965  BISI: 2018    Camacho Bhagat is a very pleasant 52 year old male with history of liver transplant (3/2017) who underwent emergent extended right hemicolectomy in the setting of an acute abdomen. He underwent ileostomy takedown (2018) c/b abdominal wall abscess at former drain site and is now s/p EUA with I and D on 18 with Dr. Dinh. He was discharged to home on 2/15/18 and was seen in clinic last week with right abdominal firm lesion. He presents today for follow up.    Interval history: Camacho reports that the site near his prior drain is remaining stable. It is slightly tender but not very painful. He does not feel is has changed in size from last week. He has no drainage at that site. He continues to deny any fevers or chills.    Assessment/Plan:  On exam he has a firm area in the right lower quadrant near his prior drain site that is actually more movable and less firm than last week. External opening has closed. Minimal tendernes on palpation.  WBC WNL last week and no fevers or chills. CT did not show any fluid collection but there was thickening and edema of the abdominal wall which looked stable. Culture showed streptococcus constellatus and enterococcus faecium susceptible to Linezolid. Will start him on linezolid and follow up in one week to ensure he is improving. Asked him not to take his flexaril while he is on linezolid. Asked him to contact the clinic in the meantime if he has any worsening symptoms. Patient's questions were answered to his stated satisfaction and he is in agreement with this plan.    Medical history:  Past Medical History:   Diagnosis Date     Depressive disorder, not elsewhere classified      Diabetes type 2, controlled (H) 11/10/2016     Esophageal reflux      Fibromyalgia 2009    dx with   Emmanuel( Rheum)     Gangrene of finger (H) 8/25/2017     H/O deep venous thrombosis 11/2001    Permanent IVC filter in place.     H/O CASTAÑEDA (nonalcoholic steatohepatitis)      H/O Pneumonia, organism unspecified(486) 10/2001    Included ARDS, sepsis, and  acute renal failure; hospitalized, required tracheostomy placement.     H/O: HTN (hypertension) 11/2001    No longer prescribed antihypertensive medication.       History of hepatocellular carcinoma      History of liver transplant (H)      History of obstructive sleep apnea     No longer wears CPAP since losing approximately 200 pounds with his liver transplant and its complications.       HLD (hyperlipidemia)      Ischemia of both lower extremities 8/25/2017    Distal ischemia due to shock/high pressor requirements     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     Osteoarthritis      Presence of PERMANENT IVC filter      Rheumatoid arthritis(714.0)        Surgical history:  Past Surgical History:   Procedure Laterality Date     BENCH LIVER N/A 3/4/2017    Procedure: BENCH LIVER;  Surgeon: Jovan Tran MD;  Location: [facility] TOTAL KNEE ARTHROPLASTY  2008    Right knee arthroscopy     CHOLECYSTECTOMY       COLONOSCOPY N/A 7/21/2017    Procedure: COMBINED COLONOSCOPY, SINGLE OR MULTIPLE BIOPSY/POLYPECTOMY BY BIOPSY;  Colonoscopy;  Surgeon: Izaiah Montes MD;  Location:  GI     ENT SURGERY      Repair of deviated septum     ESOPHAGOSCOPY, GASTROSCOPY, DUODENOSCOPY (EGD), COMBINED N/A 8/4/2016    Procedure: COMBINED ESOPHAGOSCOPY, GASTROSCOPY, DUODENOSCOPY (EGD), BIOPSY SINGLE OR MULTIPLE;  Surgeon: Trent Pederson MD;  Location:  GI     ESOPHAGOSCOPY, GASTROSCOPY, DUODENOSCOPY (EGD), COMBINED N/A 8/27/2017    Procedure: COMBINED ESOPHAGOSCOPY, GASTROSCOPY, DUODENOSCOPY (EGD);;  Surgeon: Los Wynn MD;  Location:  GI     INCISION AND DRAINAGE ABDOMEN WASHOUT, COMBINED Right 2/14/2018    Procedure: COMBINED INCISION AND DRAINAGE ABDOMEN  WASHOUT;  COMBINED INCISION AND DRAINAGE right ABDOMEN flank;  Surgeon: Sara Dinh MD;  [facility]     LAPAROTOMY EXPLORATORY N/A 2017    Procedure: LAPAROTOMY EXPLORATORY;  Exploratory Laparotomy, washout;  Surgeon: Tip Zhang MD;  [facility]     LAPAROTOMY EXPLORATORY N/A 2017    Procedure: LAPAROTOMY EXPLORATORY;  Exploratory Laparotomy, Washout with closure.;  Surgeon: Tip Zhang MD;  [facility]     LAPAROTOMY EXPLORATORY N/A 2017    Procedure: LAPAROTOMY EXPLORATORY;  Exploratory Laparotomy, Right angelique-colectomy, end ileostomy, mucosal fistula, partial omentectomy;  Surgeon: Sara Dinh MD;  Location: [facility]     ORTHOPEDIC SURGERY Right     Repair of dislocated wrist.       RELEASE TRIGGER FINGER Right 11/10/2017    Procedure: RELEASE TRIGGER FINGER;  Debride, V-Y Flap Right Index Finger;  Surgeon: Momo Noonan MD;  [facility]     TAKEDOWN ILEOSTOMY N/A 2018    Procedure: TAKEDOWN ILEOSTOMY;  Exploratory Laparotomy, Lysis of Adhesions, Takedown Of End Ileostomy, Takedown of mucocutaneous fistula, ileocolic resection  And Colorectal Anastomosis, Primary repair of Ventral Hernia, Anesthesia Block ;  Surgeon: Sara Dinh MD;  Location: [facility]     TRACHEOSTOMY  2001    During hospitalization for pneumonia.       TRANSPLANT LIVER RECIPIENT  DONOR N/A 3/4/2017    Procedure: TRANSPLANT LIVER RECIPIENT  DONOR;  Surgeon: Jovan Tran MD;  Location: [facility]       Problem list:    Patient Active Problem List    Diagnosis Date Noted     Abscess, intra-abdominal, postoperative (H) 2018     Priority: Medium     Ileostomy in place (H) 2018     Priority: Medium     Peristomal skin complication 2017     Priority: Medium     Painful periwound skin 2017     Priority: Medium     Encounter for attention to ileostomy (H) 2017     Priority: Medium     History of creation  of ostomy 10/30/2017     Priority: Medium     Elevated serum creatinine 10/16/2017     Priority: Medium     Pancytopenia (H) 08/25/2017     Priority: Medium     Gangrene of finger (H) 08/25/2017     Priority: Medium     Ischemia of both lower extremities 08/25/2017     Priority: Medium     Distal ischemia due to shock/high pressor requirements       S/P liver transplant (H) 07/26/2017     Priority: Medium     Neutropenic colitis (H) 07/04/2017     Priority: Medium     Immunosuppressed status (H) 03/10/2017     Priority: Medium     Liver transplant recipient (H) 03/04/2017     Priority: Medium     Hyperkalemia 02/14/2017     Priority: Medium     Hepatocellular carcinoma (H) 01/27/2016     Priority: Medium     Osteoarthritis 01/18/2015     Priority: Medium     Rheumatoid arthritis (H) 01/18/2015     Priority: Medium     Medication refill- do not delete  11/13/2013     Priority: Medium     Fibromyalgia 08/15/2011     Priority: Medium     Sicca syndrome (H) 08/15/2011     Priority: Medium     Testicular hypofunction      Priority: Medium     Problem list name updated by automated process. Provider to review       Carpal tunnel syndrome 07/08/2002     Priority: Medium       Medications:  Current Outpatient Prescriptions   Medication Sig Dispense Refill     linezolid (ZYVOX) 600 MG tablet Take 1 tablet (600 mg) by mouth 2 times daily for 14 days 28 tablet 0     oxyCODONE IR (ROXICODONE) 10 MG tablet Take 0.5 tablets (5 mg) by mouth every 6 hours as needed for moderate to severe pain 15 tablet 0     amoxicillin-clavulanate (AUGMENTIN) 875-125 MG per tablet Take 1 tablet by mouth every 12 hours for 13 days 26 tablet 0     tacrolimus (GENERIC EQUIVALENT) 1 MG capsule Take 1 capsule (1 mg) by mouth every 12 hours 60 capsule 11     predniSONE (DELTASONE) 2.5 MG tablet Take 1 tablet (2.5 mg) by mouth daily 90 tablet 3     furosemide (LASIX) 20 MG tablet Take 2 tablets (40 mg) by mouth 2 times daily 200 tablet 3     magnesium  oxide (MAG-OX) 400 (241.3 MG) MG tablet Take 1 tablet (400 mg) by mouth daily 30 tablet 0     sodium bicarbonate 650 MG tablet Take 1 tablet (650 mg) by mouth 3 times daily 180 tablet 3     polyethylene glycol (MIRALAX) powder Take 17 g (1 capful) by mouth daily 510 g 1     cholecalciferol (VITAMIN D3) 1000 UNIT tablet Take 1 tablet (1,000 Units) by mouth daily 100 tablet 3     mycophenolic acid (GENERIC EQUIVALENT) 360 MG EC tablet Take 2 tablets (720 mg) by mouth every 12 hours 120 tablet 3     amLODIPine (NORVASC) 10 MG tablet Take 1 tablet (10 mg) by mouth daily (Patient taking differently: Take 10 mg by mouth every morning ) 90 tablet 3     CIALIS 5 MG tablet Take 5 mg by mouth as needed   1     OMEPRAZOLE PO Take 20 mg by mouth every morning        GABAPENTIN PO Take 300 mg by mouth 3 times daily       insulin glargine (LANTUS) 100 UNIT/ML injection Inject 50 Units Subcutaneous every morning       linagliptin (TRADJENTA) 5 MG TABS tablet Take 5 mg by mouth daily        cyclobenzaprine (FLEXERIL) 10 MG tablet Take 1 tablet (10 mg) by mouth 3 times daily as needed for muscle spasms 90 tablet 0       Allergies:  Allergies   Allergen Reactions     Erythromycin GI Disturbance     Vioxx      Nausea, vomiting       Family history:  Family History   Problem Relation Age of Onset     DIABETES Father      Hypertension Father      Substance Abuse Father      Arthritis Mother      Thyroid Cancer Mother      Survivor!     Cervical Cancer Maternal Grandmother      CEREBROVASCULAR DISEASE Maternal Grandfather      Prostate Cancer Paternal Grandfather      Colon Cancer No family hx of      Hyperlipidemia No family hx of      Coronary Artery Disease No family hx of      Breast Cancer No family hx of        Social history:  Social History   Substance Use Topics     Smoking status: Former Smoker     Packs/day: 0.75     Years: 2.00     Quit date: 1988     Smokeless tobacco: Former User     Types: Chew     Quit date: 10/8/2015      Alcohol use No      Comment: No alcohol since liver transplant.       Marital status: .    Nursing Notes:   Bharath, EVERETT Samuels  2/26/2018 10:44 AM  Signed  Chief Complaint   Patient presents with     Surgical Followup     post op       Vitals:    02/26/18 1043   BP: (!) 160/93   Pulse: 90   Temp: 98.8  F (37.1  C)   TempSrc: Oral   SpO2: 100%   Weight: 197 lb 11.2 oz   Height: 6'       Body mass index is 26.81 kg/(m^2).    Abril WATSON LPN                             Physical Examination: Exam was chaperoned by Ragini Mays RN  BP (!) 160/93  Pulse 90  Temp 98.8  F (37.1  C) (Oral)  Ht 6'  Wt 197 lb 11.2 oz  SpO2 100%  BMI 26.81 kg/m2  General: alert, oriented, in no acute distress, sitting comfortably  HEENT: mucous membranes moist  Abdomen: Induration present in the right lower quadrant at the prior drain site. This is fairly movable and without erythema. Small opening now closed. Additional small opening in midline with minimal drainage and no surrounding induration.    Total face to face time was 15 minutes, >50% counseling.  This is a postop visit.    LAMIN Lozano, NP-C  Colon and Rectal Surgery  [facility]    This note was created using speech recognition software and may contain unintended word substitutions.        LAMIN Lzoano CNP

## 2018-02-26 NOTE — MR AVS SNAPSHOT
After Visit Summary   2/26/2018    Camacho Bhagat    MRN: [id number]           Patient Information     Date Of Birth          1965        Visit Information        Provider Department      2/26/2018 10:30 AM Caryn Parikh APRN CNP [facility]        Today's Diagnoses     Follow-up examination following surgery    -  1    Local infection of skin and subcutaneous tissue           Follow-ups after your visit        Your next 10 appointments already scheduled     Feb 26, 2018  1:15 PM CST   LAB with  LAB   [facility] ([facility])    [address] [place] [address]  1st Floor  [facility] [id number] [phone]           Please do not eat 10-12 hours before your appointment if you are coming in fasting for labs on lipids, cholesterol, or glucose (sugar). This does not apply to pregnant women. Water, hot tea and black coffee (with nothing added) are okay. Do not drink other fluids, diet soda or chew gum.            Feb 28, 2018  1:00 PM CST   (Arrive by 12:30 PM)   Return Visit with Cinda Cazares NP   [facility] ([facility])    [address] [place] [address]  [address]  [facility] [address]   [phone]            Mar 02, 2018 10:45 AM CST   (Arrive by 10:30 AM)   Return Liver Transplant with Toñito Camejo MD   [facility] ([facility])    [address] [place] [address]  [address]  [facility] [address]   [phone]            Mar 06, 2018 12:00 PM CST   (Arrive by 11:45 AM)   Post-Op with LAMIN Cabrera CNP   [facility] ([facility])    [address] [place] [address]  4th Floor  [facility] [address]   [phone]            Apr 04, 2018 10:45 AM CDT   (Arrive by 10:15 AM)   Return Visit with Jael Rubio MD   [facility] ([facility])    [address] [place] [address]  [address]  [facility] [address] [phone]              Who to contact     Please call your clinic at [phone] to:    Ask questions about your health    Make or cancel appointments    Discuss your medicines    Learn about your test results    Speak to your doctor            Additional Information About Your Visit        MyChart Information     [employer] gives you secure access to your electronic health record. If you see a primary care provider, you can also send messages to your care team and make appointments. If you have questions, please call your primary care clinic.  If you do not have a primary care provider, please call [phone] and they will assist you.      [employer] is an electronic gateway that provides easy, online access to your medical records. With [employer], you can request a clinic appointment, read your test results, renew a prescription or communicate with your care team.     To access your existing account, please contact your [facility] or call [phone] for assistance.        Care EveryWhere ID     This is your Care EveryWhere ID. This could be used by other organizations to access your [place] medical records  [id number]        Your Vitals Were     Pulse Temperature Height Pulse Oximetry BMI (Body Mass Index)       90 98.8  F (37.1  C) (Oral) 6' 100% 26.81 kg/m2        Blood Pressure from Last 3 Encounters:   02/26/18 (!) 160/93   02/21/18 158/87   02/15/18 151/82    Weight from Last 3 Encounters:   02/26/18 197 lb 11.2 oz   02/21/18 199 lb 9.6 oz   02/14/18 202 lb 6.4 oz              Today, you had the following     No orders found for display         Today's Medication Changes          These changes are accurate as of 2/26/18 11:03 AM.  If you have any questions, ask your nurse or doctor.               These medicines have changed or have updated prescriptions.        Dose/Directions    amLODIPine 10 MG tablet   Commonly known as:  NORVASC   This  may have changed:  when to take this   Used for:  HTN (hypertension)        Dose:  10 mg   Take 1 tablet (10 mg) by mouth daily   Quantity:  90 tablet   Refills:  3                Primary Care Provider Office Phone # Fax #    Shay Kirkpatrick [phone]       [address] [place] [address]  [facility] [address]        Equal Access to Services     [facility]: Hadii aad ku hadasho Soomaali, waaxda luqadaha, qaybta kaalmada adeegyada, waxay idiin hayaan adeeg khnerisbev lakaitchristal . So [facility] [phone].    ATENCIÓN: Si habla español, tiene a zheng disposición servicios gratuitos de asistencia lingüística. Vitalyjabari al [phone].    We comply with applicable federal civil rights laws and [place] laws. We do not discriminate on the basis of race, color, national origin, age, disability, sex, sexual orientation, or gender identity.            Thank you!     Thank you for choosing [facility]  for your care. Our goal is always to provide you with excellent care. Hearing back from our patients is one way we can continue to improve our services. Please take a few minutes to complete the written survey that you may receive in the mail after your visit with us. Thank you!             Your Updated Medication List - Protect others around you: Learn how to safely use, store and throw away your medicines at www.disposemymeds.org.          This list is accurate as of 2/26/18 11:03 AM.  Always use your most recent med list.                   Brand Name Dispense Instructions for use Diagnosis    amLODIPine 10 MG tablet    NORVASC    90 tablet    Take 1 tablet (10 mg) by mouth daily    HTN (hypertension)       amoxicillin-clavulanate 875-125 MG per tablet    AUGMENTIN    26 tablet    Take 1 tablet by mouth every 12 hours for 13 days    Abdominal abscess (H)       cholecalciferol 1000 UNIT tablet    vitamin D3    100 tablet    Take 1 tablet (1,000 Units) by mouth daily    Vitamin D deficiency       CIALIS 5 MG  tablet   Generic drug:  tadalafil      Take 5 mg by mouth as needed        cyclobenzaprine 10 MG tablet    FLEXERIL    90 tablet    Take 1 tablet (10 mg) by mouth 3 times daily as needed for muscle spasms    Immunosuppression (H)       furosemide 20 MG tablet    LASIX    200 tablet    Take 2 tablets (40 mg) by mouth 2 times daily    Hyperkalemia       GABAPENTIN PO      Take 300 mg by mouth 3 times daily        insulin glargine 100 UNIT/ML injection    LANTUS     Inject 50 Units Subcutaneous every morning        linagliptin 5 MG Tabs tablet    TRADJENTA     Take 5 mg by mouth daily        linezolid 600 MG tablet    ZYVOX    28 tablet    Take 1 tablet (600 mg) by mouth 2 times daily for 14 days    Local infection of skin and subcutaneous tissue       magnesium oxide 400 (241.3 MG) MG tablet    MAG-OX    30 tablet    Take 1 tablet (400 mg) by mouth daily    Hypomagnesemia, CKD (chronic kidney disease) stage 3, GFR 30-59 ml/min       mycophenolic acid 360 MG EC tablet    GENERIC EQUIVALENT    120 tablet    Take 2 tablets (720 mg) by mouth every 12 hours    History of liver transplant (H), Long-term use of immunosuppressant medication       OMEPRAZOLE PO      Take 20 mg by mouth every morning        oxyCODONE IR 10 MG tablet    ROXICODONE    15 tablet    Take 0.5 tablets (5 mg) by mouth every 6 hours as needed for moderate to severe pain    Intra-abdominal infection, Abdominal abscess (H)       polyethylene glycol powder    MIRALAX    510 g    Take 17 g (1 capful) by mouth daily    Ileostomy in place (H)       predniSONE 2.5 MG tablet    DELTASONE    90 tablet    Take 1 tablet (2.5 mg) by mouth daily    Liver replaced by transplant (H), Long-term use of immunosuppressant medication       sodium bicarbonate 650 MG tablet     180 tablet    Take 1 tablet (650 mg) by mouth 3 times daily    Hyperkalemia       tacrolimus 1 MG capsule    GENERIC EQUIVALENT    60 capsule    Take 1 capsule (1 mg) by mouth every 12 hours    Liver  transplant recipient (H)

## 2018-02-26 NOTE — NURSING NOTE
Chief Complaint   Patient presents with     Surgical Followup     post op       Vitals:    02/26/18 1043   BP: (!) 160/93   Pulse: 90   Temp: 98.8  F (37.1  C)   TempSrc: Oral   SpO2: 100%   Weight: 197 lb 11.2 oz   Height: 6'       Body mass index is 26.81 kg/(m^2).    Abril WATSON LPN

## 2018-02-28 ENCOUNTER — OFFICE VISIT (OUTPATIENT)
Dept: NEPHROLOGY | Facility: CLINIC | Age: 53
End: 2018-02-28
Payer: COMMERCIAL

## 2018-02-28 VITALS
HEART RATE: 88 BPM | WEIGHT: 194.4 LBS | BODY MASS INDEX: 26.33 KG/M2 | OXYGEN SATURATION: 98 % | DIASTOLIC BLOOD PRESSURE: 87 MMHG | SYSTOLIC BLOOD PRESSURE: 139 MMHG | TEMPERATURE: 98.5 F | HEIGHT: 72 IN

## 2018-02-28 DIAGNOSIS — E87.5 HYPERKALEMIA: ICD-10-CM

## 2018-02-28 DIAGNOSIS — I10 BENIGN ESSENTIAL HYPERTENSION: ICD-10-CM

## 2018-02-28 DIAGNOSIS — R80.8 OTHER PROTEINURIA: ICD-10-CM

## 2018-02-28 DIAGNOSIS — N18.30 CKD (CHRONIC KIDNEY DISEASE) STAGE 3, GFR 30-59 ML/MIN (H): Primary | ICD-10-CM

## 2018-02-28 PROCEDURE — G0463 HOSPITAL OUTPT CLINIC VISIT: HCPCS | Mod: ZF

## 2018-02-28 RX ORDER — METOPROLOL SUCCINATE 25 MG/1
25 TABLET, EXTENDED RELEASE ORAL DAILY
Qty: 90 TABLET | Refills: 3 | Status: SHIPPED | OUTPATIENT
Start: 2018-02-28 | End: 2018-03-21

## 2018-02-28 RX ORDER — SODIUM BICARBONATE 650 MG/1
650 TABLET ORAL 2 TIMES DAILY
Qty: 180 TABLET | Refills: 3
Start: 2018-02-28 | End: 2018-03-21

## 2018-02-28 ASSESSMENT — PAIN SCALES - GENERAL: PAINLEVEL: NO PAIN (0)

## 2018-02-28 NOTE — LETTER
2/28/2018      RE: Camacho Bhagat  [address]  [facility] [place] [address]       Nephrology Clinic Visit 2/28/18    Assessment and Plan:       1. CKD3b w/proteinuria - Creat 1.4 and at his baseline following diuresis with Lasix. Baseline has been in the 1.3 range.   Etiology of CKD likely multifactorial; DM, recurrent EJ, CNI. Has been intolerant of ACE/ARB previously given problems with hyperkalemia. Blood pressure improved, but not at goal. Tac dose has been decreased to undetectable level.    - Patient developed significant unexplained proteinuria at the end of January following his ileostomy takedown. Baseline Urine protein/creat had been in the 0.8 g/gCr range and then jumped to 9.57 g/gCr on 1/31/18. Blood pressures were in the 160-170/ range. SPEP/immunofixation neg for monoclonal protein, Kappa/Lambda light chain ratio is normal. Today Urine protein/creat ratio has declined to 3.59 g/gCr. UA neg for hematuria. Protein has declined to 100 from > 499 in January. SBP in the 130-150/ range. A1c 5.1% in January.        - Will continue to work on blood pressure management. Goal is < 130/80       - Can not add ACE/ARB given ongoing issues with hyperkalemia.     - Avoid NSAIDs, nephrotoxins   - A1c goal is 7%. HgA1c 5.1% Jan 2018   - B/P goal is < 140/80. Currently uncontrolled, but close      2. HTN - Improved control. Clinic blood pressures 130/ x 3. Home blood pressures ave in the 150/ range. No further edema. Weight down 20 #. Currently on Lasix 40 mg bid and Amlodipine 10 mg qd.    - Will add Toprol XL 25 mg qd   -  Continue daily blood pressure readings      3. Hyperkalemia assoc with Tac - Potassium levels were controlled on Valtessa and Florinef prior to ileostomy takedown and then began to decline to the point that we stopped both. K was in the low 3 range prior to readmission for the abdominal abscess and then began rising again. Now in the low to mid 5 range. He is on Furosemide. May be stopping Tac  at transplant visit on Friday.     - Will have chemistries drawn next Wed. If K remains elevated patient agreeable to restarsting Valtessa. .       4. Volume status - Euvolemic. No edema/dyspnea/weight down 20#. Only having 2-4 stools/day. Voiding w/o difficulty. Weight 194.4 #, down from 216# last visit. Blood pressures improved   - Continue Lasix 40 mg bid.    - Check labs next week      5. Acid base - Bicarb 26 in setting of decreased stools following takedown. Currently on Bicarb 650 mg TID   - Decrease Bicarb to 650 mg BID.    - Recheck labs next week      6. BMD - Ca 8.5, Phos 3.5, albumin 3.5   - Vitamin D 20 and PTH 34 (9/14/17)   - Continue Vit D 1000 U qd      7. Hypomagnesemia - Mag 2.4   - D/C mag oxide      8. Anemia - Improving. Hgb 9.6. Had risen to 9.4 prior to surgery on 1/5/18. Etiology felt to be 2/2 CKD and decreased absorption 2/2 bowel resection   - Iron studies 1/18: Ferritin 973, Fe 37, Tsat 23%.    - Given Aranesp 25 mcg x 1 on 10/27/17   - Not clear patient really needs DIPAK at this time. His Hgb was rising prior to surgery. Will hold off on referral at this time.       9. Liver transplant IS: Tacrolimus, MMF, Pred   - Tacro dose decreased to undetectable level. IS regimen to be readdressed by Dr Camejo this week.       10. Dispo- RCT 1 month w/me and 4/4 with Dr Rubio. Labs next week      Assessment and plan was discussed with patient and he voiced his understanding and agreement.      Reason for Visit:  CKD/HTN follow up        HPI:  Mr Bhagat is a 53 yo male in today for CKD3 f/u. He has struggled with hyperkalemia attributed to Tacrolimus ( currently on very low dose with undetected level) and recurrent EJ 2/2 large volume ostomy output. Had ostomy takedown on 1/5/18. Demonstrated unusual proteinuria and decline in K, following takedown surgery. Florinef and Valtessa were discontinued prior to hospital admission with K in the 3 range.       Interval Hx:   Patient had hospital admission  2/12-2/15 for abdominal abscess. Initially thought to be a leak related to the takedown but following EUA w/I/D found to not be a leak but an abscess of unclear etiology. Abscess culture growing strep constellatus and E. Faecium, so Abx regimen changed from Augmentin to Linezolid on 2/26/18 by Surgery.    Interestingly, patient's proteinuria has improved following treatment of the abscess and his potassium has started rising again into the 5 range. Has not yet restarted Valtessa.   Patient has been checking his blood pressures at home. He brings in readings that are primarily in the 150/ range. HR int he 70-80's. Edema has resolved.      Baseline Cr: 1.3 range      ROS:  Feeling well. Having 2-4 stools/day. Denies CP, dyspnea, voiding concerns. Edema has resolved. Active and would like to begin weight lifting, but following lifting restrictions. Appetite is good.         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 Former smoker, LPN by profession.     Allergies:  Allergies   Allergen Reactions     Erythromycin GI Disturbance     Vioxx      Nausea, vomiting       Medications:  Prior to Admission medications    Medication Sig Start Date End Date Taking? Authorizing Provider   sodium bicarbonate 650 MG tablet Take 1 tablet (650 mg) by mouth 2 times daily 2/28/18  Yes Cinda Cazares NP   metoprolol succinate (TOPROL-XL) 25 MG 24 hr tablet Take 1 tablet (25 mg) by mouth daily 2/28/18  Yes Cinda Cazares NP   linezolid (ZYVOX) 600 MG tablet Take 1 tablet (600 mg) by mouth 2 times daily for 14 days 2/25/18 3/11/18 Yes Caryn Parikh, APRN CNP   oxyCODONE IR  (ROXICODONE) 10 MG tablet Take 0.5 tablets (5 mg) by mouth every 6 hours as needed for moderate to severe pain 2/15/18  Yes Paulo Hunter MD   tacrolimus (GENERIC EQUIVALENT) 1 MG capsule Take 1 capsule (1 mg) by mouth every 12 hours 2/15/18  Yes Felicia Tolliver APRN CNP   predniSONE (DELTASONE) 2.5 MG tablet Take 1 tablet (2.5 mg) by mouth daily 2/5/18  Yes Jovan Tran MD   furosemide (LASIX) 20 MG tablet Take 2 tablets (40 mg) by mouth 2 times daily 1/31/18  Yes Cinda Cazares NP   cholecalciferol (VITAMIN D3) 1000 UNIT tablet Take 1 tablet (1,000 Units) by mouth daily 12/6/17  Yes Cinda Cazares NP   mycophenolic acid (GENERIC EQUIVALENT) 360 MG EC tablet Take 2 tablets (720 mg) by mouth every 12 hours 11/20/17  Yes Jovan Tran MD   amLODIPine (NORVASC) 10 MG tablet Take 1 tablet (10 mg) by mouth daily  Patient taking differently: Take 10 mg by mouth every morning  11/7/17  Yes Ninfa Hernández MD   CIALIS 5 MG tablet Take 5 mg by mouth as needed  6/7/17  Yes Reported, Patient   OMEPRAZOLE PO Take 20 mg by mouth every morning    Yes Reported, Patient   GABAPENTIN PO Take 300 mg by mouth 3 times daily   Yes Reported, Patient   insulin glargine (LANTUS) 100 UNIT/ML injection Inject 50 Units Subcutaneous every morning   Yes Reported, Patient   linagliptin (TRADJENTA) 5 MG TABS tablet Take 5 mg by mouth daily    Yes Reported, Patient   cyclobenzaprine (FLEXERIL) 10 MG tablet Take 1 tablet (10 mg) by mouth 3 times daily as needed for muscle spasms 9/8/17  Yes Felicia Tolliver APRN CNP       Vitals:  /87  Pulse 88  Temp 98.5  F (36.9  C) (Oral)  Ht 1.829 m (6')  Wt 88.2 kg (194 lb 6.4 oz)  SpO2 98%  BMI 26.37 kg/m2    Exam:  Gen: Calm, pleasant and interactive  CARDIAC: Mild tachycardia  LUNGS: CTA  ABDOMEN: Abdominal dressing d/i. NT,. Non distended. EXT: No edema    Results:  Results for RONNIE ARIZMENDI (MRN [id number]) as of 2/28/2018 14:01   Ref. Range  2/26/2018 11:11 2/26/2018 11:15   Sodium Latest Ref Range: 133 - 144 mmol/L 134    Potassium Latest Ref Range: 3.4 - 5.3 mmol/L 5.4 (H)    Chloride Latest Ref Range: 94 - 109 mmol/L 102    Carbon Dioxide Latest Ref Range: 20 - 32 mmol/L 26    Urea Nitrogen Latest Ref Range: 7 - 30 mg/dL 37 (H)    Creatinine Latest Ref Range: 0.66 - 1.25 mg/dL 1.40 (H)    GFR Estimate Latest Ref Range: >60 mL/min/1.7m2 53 (L)    GFR Estimate If Black Latest Ref Range: >60 mL/min/1.7m2 64    Calcium Latest Ref Range: 8.5 - 10.1 mg/dL 8.5    Anion Gap Latest Ref Range: 3 - 14 mmol/L 6    Magnesium Latest Ref Range: 1.6 - 2.3 mg/dL 2.4 (H)    Phosphorus Latest Ref Range: 2.5 - 4.5 mg/dL 3.5    Albumin Latest Ref Range: 3.4 - 5.0 g/dL 3.5    Protein Total Latest Ref Range: 6.8 - 8.8 g/dL 7.5    Bilirubin Total Latest Ref Range: 0.2 - 1.3 mg/dL 0.3    Alkaline Phosphatase Latest Ref Range: 40 - 150 U/L 180 (H)    ALT Latest Ref Range: 0 - 70 U/L 40    AST Latest Ref Range: 0 - 45 U/L 41    Bilirubin Direct Latest Ref Range: 0.0 - 0.2 mg/dL 0.1    Cholesterol Latest Ref Range: <200 mg/dL 117    Creatinine Urine Latest Units: mg/dL  51   HDL Cholesterol Latest Ref Range: >39 mg/dL 30 (L)    LDL Cholesterol Calculated Latest Ref Range: <100 mg/dL 29    Non HDL Cholesterol Latest Ref Range: <130 mg/dL 87    Protein Random Urine Latest Units: g/L  1.82   Protein Total Urine g/gr Creatinine Latest Ref Range: 0 - 0.2 g/g Cr  3.59 (H)   Triglycerides Latest Ref Range: <150 mg/dL 293 (H)    Glucose Latest Ref Range: 70 - 99 mg/dL 283 (H)    WBC Latest Ref Range: 4.0 - 11.0 10e9/L 5.5    Hemoglobin Latest Ref Range: 13.3 - 17.7 g/dL 9.6 (L)    Hematocrit Latest Ref Range: 40.0 - 53.0 % 28.9 (L)    Platelet Count Latest Ref Range: 150 - 450 10e9/L 107 (L)    RBC Count Latest Ref Range: 4.4 - 5.9 10e12/L 3.17 (L)    MCV Latest Ref Range: 78 - 100 fl 91    MCH Latest Ref Range: 26.5 - 33.0 pg 30.3    MCHC Latest Ref Range: 31.5 - 36.5 g/dL 33.2    RDW  Latest Ref Range: 10.0 - 15.0 % 16.6 (H)    Color Urine Unknown  Yellow   Appearance Urine Unknown  Clear   Glucose Urine Latest Ref Range: NEG^Negative mg/dL  >499 (A)   Bilirubin Urine Latest Ref Range: NEG^Negative   Negative   Ketones Urine Latest Ref Range: NEG^Negative mg/dL  Negative   Specific Gravity Urine Latest Ref Range: 1.003 - 1.035   1.010   pH Urine Latest Ref Range: 5.0 - 7.0 pH  5.0   Protein Albumin Urine Latest Ref Range: NEG^Negative mg/dL  100 (A)   Urobilinogen mg/dL Latest Ref Range: 0.0 - 2.0 mg/dL  0.0   Nitrite Urine Latest Ref Range: NEG^Negative   Negative   Blood Urine Latest Ref Range: NEG^Negative   Negative   Leukocyte Esterase Urine Latest Ref Range: NEG^Negative   Negative   Source Unknown  Midstream Urine   WBC Urine Latest Ref Range: 0 - 2 /HPF  1   RBC Urine Latest Ref Range: 0 - 2 /HPF  2   Bacteria Urine Latest Ref Range: NEG^Negative /HPF  Few (A)   Mucous Urine Latest Ref Range: NEG^Negative /LPF  Present (A)   Hyaline Casts Latest Ref Range: 0 - 2 /LPF  6 (H)   Tacrolimus Last Dose Unknown 2130,2/25/18    Tacrolimus Level Latest Ref Range: 5.0 - 15.0 ug/L <3.0 (L)          Cinda Cazares, NP

## 2018-02-28 NOTE — NURSING NOTE
Chief Complaint   Patient presents with     RECHECK     CKD       Initial Pulse 88  Temp 98.5  F (36.9  C) (Oral)  Ht 1.829 m (6')  Wt 88.2 kg (194 lb 6.4 oz)  SpO2 98%  BMI 26.37 kg/m2 Estimated body mass index is 26.37 kg/(m^2) as calculated from the following:    Height as of this encounter: 1.829 m (6').    Weight as of this encounter: 88.2 kg (194 lb 6.4 oz).  Medication Reconciliation: complete     Wale Hines MA

## 2018-02-28 NOTE — MR AVS SNAPSHOT
After Visit Summary   2/28/2018    Camacho Bhagat    MRN: [id number]           Patient Information     Date Of Birth          1965        Visit Information        Provider Department      2/28/2018 1:00 PM Cinda Cazares NP [facility] Nephrology        Today's Diagnoses     Benign essential hypertension    -  1    Hyperkalemia        CKD (chronic kidney disease) stage 3, GFR 30-59 ml/min        Other proteinuria          Care Instructions    1. Stop Mag oxide  2. Start Toprol XL 25 mg daily  3. Decrease bicarb to bid  4. Hold on restarting Valtessa until we see labs next week  5. Recheck chemistries next Wed  6. Continue with daily blood pressures. Call me if your blood pressure < 110/.   7. Repeat Urine studies in 2 wks          Follow-ups after your visit        Follow-up notes from your care team     Return in about 4 weeks (around 3/28/2018).      Your next 10 appointments already scheduled     Mar 02, 2018 10:45 AM CST   (Arrive by 10:30 AM)   Return Liver Transplant with Toñito Camejo MD   [facility] Hepatology ([facility])    [address] [place] [address]  [address] [place] [id number] [phone]            Mar 06, 2018 12:00 PM CST   (Arrive by 11:45 AM)   Post-Op with LAMIN Cabrera [facility] Colon and Rectal Surgery ([facility])    [address] [place] [address]  [facility] [id number]   [phone]            Mar 28, 2018  1:00 PM CDT   (Arrive by 12:30 PM)   Return Visit with Cinda Cazares NP   [facility] Nephrology ([facility])    [address] [place] [address]  [address] [place] [id number] [phone]            Apr 04, 2018 10:45 AM CDT   (Arrive by 10:15 AM)   Return Visit with Jael Rubio MD   [facility] Nephrology ([facility])    [address] [place] [address]  [address] [place] [id number] [phone]              Future tests that  were ordered for you today     Open Future Orders        Priority Expected Expires Ordered    Renal panel Routine  2/28/2019 2/28/2018    Protein  random urine with Creat Ratio Routine  2/28/2019 2/28/2018            Who to contact     If you have questions or need follow up information about today's clinic visit or your schedule please contact [facility] directly at [phone].  Normal or non-critical lab and imaging results will be communicated to you by Synoste Oyhart, letter or phone within 4 business days after the clinic has received the results. If you do not hear from us within 7 days, please contact the clinic through Volpitt or phone. If you have a critical or abnormal lab result, we will notify you by phone as soon as possible.  Submit refill requests through Milyoni or call your pharmacy and they will forward the refill request to us. Please allow 3 business days for your refill to be completed.          Additional Information About Your Visit        Synoste OyharAngelPrime Information     Milyoni gives you secure access to your electronic health record. If you see a primary care provider, you can also send messages to your care team and make appointments. If you have questions, please call your primary care clinic.  If you do not have a primary care provider, please call [phone] and they will assist you.        Care EveryWhere ID     This is your Care EveryWhere ID. This could be used by other organizations to access your [facility] medical records  [id number]        Your Vitals Were     Pulse Temperature Height Pulse Oximetry BMI (Body Mass Index)       88 98.5  F (36.9  C) (Oral) 1.829 m (6') 98% 26.37 kg/m2        Blood Pressure from Last 3 Encounters:   02/28/18 139/87   02/26/18 (!) 160/93   02/21/18 158/87    Weight from Last 3 Encounters:   02/28/18 88.2 kg (194 lb 6.4 oz)   02/26/18 89.7 kg (197 lb 11.2 oz)   02/21/18 90.5 kg (199 lb 9.6 oz)                 Today's Medication Changes          These  changes are accurate as of 2/28/18  2:03 PM.  If you have any questions, ask your nurse or doctor.               Start taking these medicines.        Dose/Directions    metoprolol succinate 25 MG 24 hr tablet   Commonly known as:  TOPROL-XL   Used for:  Benign essential hypertension   Started by:  Cinda Cazares NP        Dose:  25 mg   Take 1 tablet (25 mg) by mouth daily   Quantity:  90 tablet   Refills:  3         These medicines have changed or have updated prescriptions.        Dose/Directions    amLODIPine 10 MG tablet   Commonly known as:  NORVASC   This may have changed:  when to take this   Used for:  HTN (hypertension)        Dose:  10 mg   Take 1 tablet (10 mg) by mouth daily   Quantity:  90 tablet   Refills:  3       sodium bicarbonate 650 MG tablet   This may have changed:  when to take this   Used for:  Hyperkalemia   Changed by:  Cinda Cazares NP        Dose:  650 mg   Take 1 tablet (650 mg) by mouth 2 times daily   Quantity:  180 tablet   Refills:  3            Where to get your medicines      These medications were sent to [facility] [address] [place] [address] [address]  [address] [place] [id number]     Phone:  [phone]     metoprolol succinate 25 MG 24 hr tablet         Some of these will need a paper prescription and others can be bought over the counter.  Ask your nurse if you have questions.     You don't need a prescription for these medications     sodium bicarbonate 650 MG tablet                Primary Care Provider Office Phone # Fax #    Shay Kirkpatrick [phone]       [address] [place] [address] [address] [place] [id number]        Equal Access to Services     [facility]: Hadrere Carlisle, waaxda luqadaha, qaybta kaalmada magdaleno, devi duckworth. So [facility] [phone].    ATENCIÓN: Si habla español, tiene a zheng disposición servicios gratuitos de asistencia lingüística.  Vitalyjabari montemayor [phone].    We comply with applicable federal civil rights laws and [place] laws. We do not discriminate on the basis of race, color, national origin, age, disability, sex, sexual orientation, or gender identity.            Thank you!     Thank you for choosing [facility]  for your care. Our goal is always to provide you with excellent care. Hearing back from our patients is one way we can continue to improve our services. Please take a few minutes to complete the written survey that you may receive in the mail after your visit with us. Thank you!             Your Updated Medication List - Protect others around you: Learn how to safely use, store and throw away your medicines at www.disposemymeds.org.          This list is accurate as of 2/28/18  2:03 PM.  Always use your most recent med list.                   Brand Name Dispense Instructions for use Diagnosis    amLODIPine 10 MG tablet    NORVASC    90 tablet    Take 1 tablet (10 mg) by mouth daily    HTN (hypertension)       cholecalciferol 1000 UNIT tablet    vitamin D3    100 tablet    Take 1 tablet (1,000 Units) by mouth daily    Vitamin D deficiency       CIALIS 5 MG tablet   Generic drug:  tadalafil      Take 5 mg by mouth as needed        cyclobenzaprine 10 MG tablet    FLEXERIL    90 tablet    Take 1 tablet (10 mg) by mouth 3 times daily as needed for muscle spasms    Immunosuppression (H)       furosemide 20 MG tablet    LASIX    200 tablet    Take 2 tablets (40 mg) by mouth 2 times daily    Hyperkalemia       GABAPENTIN PO      Take 300 mg by mouth 3 times daily        insulin glargine 100 UNIT/ML injection    LANTUS     Inject 50 Units Subcutaneous every morning        linagliptin 5 MG Tabs tablet    TRADJENTA     Take 5 mg by mouth daily        linezolid 600 MG tablet    ZYVOX    28 tablet    Take 1 tablet (600 mg) by mouth 2 times daily for 14 days    Local infection of skin and subcutaneous tissue       metoprolol succinate  25 MG 24 hr tablet    TOPROL-XL    90 tablet    Take 1 tablet (25 mg) by mouth daily    Benign essential hypertension       mycophenolic acid 360 MG EC tablet    GENERIC EQUIVALENT    120 tablet    Take 2 tablets (720 mg) by mouth every 12 hours    History of liver transplant (H), Long-term use of immunosuppressant medication       OMEPRAZOLE PO      Take 20 mg by mouth every morning        oxyCODONE IR 10 MG tablet    ROXICODONE    15 tablet    Take 0.5 tablets (5 mg) by mouth every 6 hours as needed for moderate to severe pain    Intra-abdominal infection, Abdominal abscess (H)       predniSONE 2.5 MG tablet    DELTASONE    90 tablet    Take 1 tablet (2.5 mg) by mouth daily    Liver replaced by transplant (H), Long-term use of immunosuppressant medication       sodium bicarbonate 650 MG tablet     180 tablet    Take 1 tablet (650 mg) by mouth 2 times daily    Hyperkalemia       tacrolimus 1 MG capsule    GENERIC EQUIVALENT    60 capsule    Take 1 capsule (1 mg) by mouth every 12 hours    Liver transplant recipient (H)

## 2018-02-28 NOTE — PATIENT INSTRUCTIONS
1. Stop Mag oxide  2. Start Toprol XL 25 mg daily  3. Decrease bicarb to bid  4. Hold on restarting Valtessa until we see labs next week  5. Recheck chemistries next Wed  6. Continue with daily blood pressures. Call me if your blood pressure < 110/.   7. Repeat Urine studies in 2 wks

## 2018-02-28 NOTE — PROGRESS NOTES
Nephrology Clinic Visit 2/28/18    Assessment and Plan:       1. CKD3b w/proteinuria - Creat 1.4 and at his baseline following diuresis with Lasix. Baseline has been in the 1.3 range.   Etiology of CKD likely multifactorial; DM, recurrent EJ, CNI. Has been intolerant of ACE/ARB previously given problems with hyperkalemia. Blood pressure improved, but not at goal. Tac dose has been decreased to undetectable level.    - Patient developed significant unexplained proteinuria at the end of January following his ileostomy takedown. Baseline Urine protein/creat had been in the 0.8 g/gCr range and then jumped to 9.57 g/gCr on 1/31/18. Blood pressures were in the 160-170/ range. SPEP/immunofixation neg for monoclonal protein, Kappa/Lambda light chain ratio is normal. Today Urine protein/creat ratio has declined to 3.59 g/gCr. UA neg for hematuria. Protein has declined to 100 from > 499 in January. SBP in the 130-150/ range. A1c 5.1% in January.        - Will continue to work on blood pressure management. Goal is < 130/80       - Can not add ACE/ARB given ongoing issues with hyperkalemia.     - Avoid NSAIDs, nephrotoxins   - A1c goal is 7%. HgA1c 5.1% Jan 2018   - B/P goal is < 140/80. Currently uncontrolled, but close      2. HTN - Improved control. Clinic blood pressures 130/ x 3. Home blood pressures ave in the 150/ range. No further edema. Weight down 20 #. Currently on Lasix 40 mg bid and Amlodipine 10 mg qd.    - Will add Toprol XL 25 mg qd   - Continue daily blood pressure readings      3. Hyperkalemia assoc with Tac - Potassium levels were controlled on Valtessa and Florinef prior to ileostomy takedown and then began to decline to the point that we stopped both. K was in the low 3 range prior to readmission for the abdominal abscess and then began rising again. Now in the low to mid 5 range. He is on Furosemide. May be stopping Tac at transplant visit on Friday.     - Will have chemistries drawn next Wed. If K  remains elevated patient agreeable to restarsting Valtessa. .       4. Volume status - Euvolemic. No edema/dyspnea/weight down 20#. Only having 2-4 stools/day. Voiding w/o difficulty. Weight 194.4 #, down from 216# last visit. Blood pressures improved   - Continue Lasix 40 mg bid.    - Check labs next week      5. Acid base - Bicarb 26 in setting of decreased stools following takedown. Currently on Bicarb 650 mg TID   - Decrease Bicarb to 650 mg BID.    - Recheck labs next week      6. BMD - Ca 8.5, Phos 3.5, albumin 3.5   - Vitamin D 20 and PTH 34 (9/14/17)   - Continue Vit D 1000 U qd      7. Hypomagnesemia - Mag 2.4   - D/C mag oxide      8. Anemia - Improving. Hgb 9.6. Had risen to 9.4 prior to surgery on 1/5/18. Etiology felt to be 2/2 CKD and decreased absorption 2/2 bowel resection   - Iron studies 1/18: Ferritin 973, Fe 37, Tsat 23%.    - Given Aranesp 25 mcg x 1 on 10/27/17   - Not clear patient really needs DIPAK at this time. His Hgb was rising prior to surgery. Will hold off on referral at this time.       9. Liver transplant IS: Tacrolimus, MMF, Pred   - Tacro dose decreased to undetectable level. IS regimen to be readdressed by Dr Camejo this week.       10. Dispo- RCT 1 month w/me and 4/4 with Dr Rubio. Labs next week      Assessment and plan was discussed with patient and he voiced his understanding and agreement.      Reason for Visit:  CKD/HTN follow up        HPI:  Mr Bhagat is a 51 yo male in today for CKD3 f/u. He has struggled with hyperkalemia attributed to Tacrolimus ( currently on very low dose with undetected level) and recurrent EJ 2/2 large volume ostomy output. Had ostomy takedown on 1/5/18. Demonstrated unusual proteinuria and decline in K, following takedown surgery. Florinef and Valtessa were discontinued prior to hospital admission with K in the 3 range.       Interval Hx:   Patient had hospital admission 2/12-2/15 for abdominal abscess. Initially thought to be a leak related to the  takedown but following EUA w/I/D found to not be a leak but an abscess of unclear etiology. Abscess culture growing strep constellatus and E. Faecium, so Abx regimen changed from Augmentin to Linezolid on 2/26/18 by Surgery.    Interestingly, patient's proteinuria has improved following treatment of the abscess and his potassium has started rising again into the 5 range. Has not yet restarted Valtessa.   Patient has been checking his blood pressures at home. He brings in readings that are primarily in the 150/ range. HR int he 70-80's. Edema has resolved.      Baseline Cr: 1.3 range      ROS:  Feeling well. Having 2-4 stools/day. Denies CP, dyspnea, voiding concerns. Edema has resolved. Active and would like to begin weight lifting, but following lifting restrictions. Appetite is good.         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 Former smoker, LPN by profession.     Allergies:  Allergies   Allergen Reactions     Erythromycin GI Disturbance     Vioxx      Nausea, vomiting       Medications:  Prior to Admission medications    Medication Sig Start Date End Date Taking? Authorizing Provider   sodium bicarbonate 650 MG tablet Take 1 tablet (650 mg) by mouth 2 times daily 2/28/18  Yes Cinda Cazares NP   metoprolol succinate (TOPROL-XL) 25 MG 24 hr tablet Take 1 tablet (25 mg) by mouth daily 2/28/18  Yes Cinda Cazares NP   linezolid (ZYVOX) 600 MG tablet Take 1 tablet (600 mg) by mouth 2 times daily for 14 days 2/25/18 3/11/18 Yes Caryn Parikh APRN CNP   oxyCODONE IR (ROXICODONE) 10 MG tablet Take 0.5 tablets (5 mg) by mouth every 6 hours as needed for  moderate to severe pain 2/15/18  Yes Paulo Hunter MD   tacrolimus (GENERIC EQUIVALENT) 1 MG capsule Take 1 capsule (1 mg) by mouth every 12 hours 2/15/18  Yes Felicia Tolliver APRN CNP   predniSONE (DELTASONE) 2.5 MG tablet Take 1 tablet (2.5 mg) by mouth daily 2/5/18  Yes Jovan Tran MD   furosemide (LASIX) 20 MG tablet Take 2 tablets (40 mg) by mouth 2 times daily 1/31/18  Yes Cinda Cazares NP   cholecalciferol (VITAMIN D3) 1000 UNIT tablet Take 1 tablet (1,000 Units) by mouth daily 12/6/17  Yes Cinda Cazares NP   mycophenolic acid (GENERIC EQUIVALENT) 360 MG EC tablet Take 2 tablets (720 mg) by mouth every 12 hours 11/20/17  Yes Jovan Tran MD   amLODIPine (NORVASC) 10 MG tablet Take 1 tablet (10 mg) by mouth daily  Patient taking differently: Take 10 mg by mouth every morning  11/7/17  Yes Ninfa Hernández MD   CIALIS 5 MG tablet Take 5 mg by mouth as needed  6/7/17  Yes Reported, Patient   OMEPRAZOLE PO Take 20 mg by mouth every morning    Yes Reported, Patient   GABAPENTIN PO Take 300 mg by mouth 3 times daily   Yes Reported, Patient   insulin glargine (LANTUS) 100 UNIT/ML injection Inject 50 Units Subcutaneous every morning   Yes Reported, Patient   linagliptin (TRADJENTA) 5 MG TABS tablet Take 5 mg by mouth daily    Yes Reported, Patient   cyclobenzaprine (FLEXERIL) 10 MG tablet Take 1 tablet (10 mg) by mouth 3 times daily as needed for muscle spasms 9/8/17  Yes Felicia Tolliver APRN CNP       Vitals:  /87  Pulse 88  Temp 98.5  F (36.9  C) (Oral)  Ht 1.829 m (6')  Wt 88.2 kg (194 lb 6.4 oz)  SpO2 98%  BMI 26.37 kg/m2    Exam:  Gen: Calm, pleasant and interactive  CARDIAC: Mild tachycardia  LUNGS: CTA  ABDOMEN: Abdominal dressing d/i. NT,. Non distended. EXT: No edema    Results:  Results for RONNIE ARIZMENDI (MRN [id number]) as of 2/28/2018 14:01   Ref. Range 2/26/2018 11:11 2/26/2018 11:15   Sodium Latest Ref Range: 133 - 144 mmol/L 134    Potassium  Latest Ref Range: 3.4 - 5.3 mmol/L 5.4 (H)    Chloride Latest Ref Range: 94 - 109 mmol/L 102    Carbon Dioxide Latest Ref Range: 20 - 32 mmol/L 26    Urea Nitrogen Latest Ref Range: 7 - 30 mg/dL 37 (H)    Creatinine Latest Ref Range: 0.66 - 1.25 mg/dL 1.40 (H)    GFR Estimate Latest Ref Range: >60 mL/min/1.7m2 53 (L)    GFR Estimate If Black Latest Ref Range: >60 mL/min/1.7m2 64    Calcium Latest Ref Range: 8.5 - 10.1 mg/dL 8.5    Anion Gap Latest Ref Range: 3 - 14 mmol/L 6    Magnesium Latest Ref Range: 1.6 - 2.3 mg/dL 2.4 (H)    Phosphorus Latest Ref Range: 2.5 - 4.5 mg/dL 3.5    Albumin Latest Ref Range: 3.4 - 5.0 g/dL 3.5    Protein Total Latest Ref Range: 6.8 - 8.8 g/dL 7.5    Bilirubin Total Latest Ref Range: 0.2 - 1.3 mg/dL 0.3    Alkaline Phosphatase Latest Ref Range: 40 - 150 U/L 180 (H)    ALT Latest Ref Range: 0 - 70 U/L 40    AST Latest Ref Range: 0 - 45 U/L 41    Bilirubin Direct Latest Ref Range: 0.0 - 0.2 mg/dL 0.1    Cholesterol Latest Ref Range: <200 mg/dL 117    Creatinine Urine Latest Units: mg/dL  51   HDL Cholesterol Latest Ref Range: >39 mg/dL 30 (L)    LDL Cholesterol Calculated Latest Ref Range: <100 mg/dL 29    Non HDL Cholesterol Latest Ref Range: <130 mg/dL 87    Protein Random Urine Latest Units: g/L  1.82   Protein Total Urine g/gr Creatinine Latest Ref Range: 0 - 0.2 g/g Cr  3.59 (H)   Triglycerides Latest Ref Range: <150 mg/dL 293 (H)    Glucose Latest Ref Range: 70 - 99 mg/dL 283 (H)    WBC Latest Ref Range: 4.0 - 11.0 10e9/L 5.5    Hemoglobin Latest Ref Range: 13.3 - 17.7 g/dL 9.6 (L)    Hematocrit Latest Ref Range: 40.0 - 53.0 % 28.9 (L)    Platelet Count Latest Ref Range: 150 - 450 10e9/L 107 (L)    RBC Count Latest Ref Range: 4.4 - 5.9 10e12/L 3.17 (L)    MCV Latest Ref Range: 78 - 100 fl 91    MCH Latest Ref Range: 26.5 - 33.0 pg 30.3    MCHC Latest Ref Range: 31.5 - 36.5 g/dL 33.2    RDW Latest Ref Range: 10.0 - 15.0 % 16.6 (H)    Color Urine Unknown  Yellow   Appearance Urine  Unknown  Clear   Glucose Urine Latest Ref Range: NEG^Negative mg/dL  >499 (A)   Bilirubin Urine Latest Ref Range: NEG^Negative   Negative   Ketones Urine Latest Ref Range: NEG^Negative mg/dL  Negative   Specific Gravity Urine Latest Ref Range: 1.003 - 1.035   1.010   pH Urine Latest Ref Range: 5.0 - 7.0 pH  5.0   Protein Albumin Urine Latest Ref Range: NEG^Negative mg/dL  100 (A)   Urobilinogen mg/dL Latest Ref Range: 0.0 - 2.0 mg/dL  0.0   Nitrite Urine Latest Ref Range: NEG^Negative   Negative   Blood Urine Latest Ref Range: NEG^Negative   Negative   Leukocyte Esterase Urine Latest Ref Range: NEG^Negative   Negative   Source Unknown  Midstream Urine   WBC Urine Latest Ref Range: 0 - 2 /HPF  1   RBC Urine Latest Ref Range: 0 - 2 /HPF  2   Bacteria Urine Latest Ref Range: NEG^Negative /HPF  Few (A)   Mucous Urine Latest Ref Range: NEG^Negative /LPF  Present (A)   Hyaline Casts Latest Ref Range: 0 - 2 /LPF  6 (H)   Tacrolimus Last Dose Unknown 2130,2/25/18    Tacrolimus Level Latest Ref Range: 5.0 - 15.0 ug/L <3.0 (L)

## 2018-03-02 ENCOUNTER — TELEPHONE (OUTPATIENT)
Dept: TRANSPLANT | Facility: CLINIC | Age: 53
End: 2018-03-02

## 2018-03-02 ENCOUNTER — OFFICE VISIT (OUTPATIENT)
Dept: GASTROENTEROLOGY | Facility: CLINIC | Age: 53
End: 2018-03-02
Attending: INTERNAL MEDICINE
Payer: COMMERCIAL

## 2018-03-02 VITALS
HEIGHT: 72 IN | OXYGEN SATURATION: 100 % | TEMPERATURE: 98.3 F | BODY MASS INDEX: 26.76 KG/M2 | WEIGHT: 197.6 LBS | DIASTOLIC BLOOD PRESSURE: 88 MMHG | SYSTOLIC BLOOD PRESSURE: 157 MMHG | HEART RATE: 75 BPM

## 2018-03-02 DIAGNOSIS — E11.22 TYPE 2 DIABETES MELLITUS WITH STAGE 3 CHRONIC KIDNEY DISEASE, WITH LONG-TERM CURRENT USE OF INSULIN (H): Primary | ICD-10-CM

## 2018-03-02 DIAGNOSIS — Z79.4 TYPE 2 DIABETES MELLITUS WITH STAGE 3 CHRONIC KIDNEY DISEASE, WITH LONG-TERM CURRENT USE OF INSULIN (H): Primary | ICD-10-CM

## 2018-03-02 DIAGNOSIS — N18.30 TYPE 2 DIABETES MELLITUS WITH STAGE 3 CHRONIC KIDNEY DISEASE, WITH LONG-TERM CURRENT USE OF INSULIN (H): Primary | ICD-10-CM

## 2018-03-02 PROCEDURE — G0463 HOSPITAL OUTPT CLINIC VISIT: HCPCS | Mod: ZF

## 2018-03-02 ASSESSMENT — PAIN SCALES - GENERAL: PAINLEVEL: NO PAIN (0)

## 2018-03-02 NOTE — MR AVS SNAPSHOT
After Visit Summary   3/2/2018    Camacho Bhagat    MRN: [id number]           Patient Information     Date Of Birth          1965        Visit Information        Provider Department      3/2/2018 10:45 AM Toñito Camejo MD [facility] Hepatology        Today's Diagnoses     Type 2 diabetes mellitus with stage 3 chronic kidney disease, with long-term current use of insulin (H)    -  1       Follow-ups after your visit        Follow-up notes from your care team     Return in about 3 months (around 6/2/2018).      Your next 10 appointments already scheduled     Mar 06, 2018 12:00 PM CST   (Arrive by 11:45 AM)   Post-Op with Caryn Parikh APRN [facility] Colon and Rectal Surgery ([facility])    [address] [place] [address]  4th Floor  [facility] [id number] [phone]            Mar 28, 2018  1:00 PM CDT   (Arrive by 12:30 PM)   Return Visit with Cinda Cazares NP   [facility] Nephrology ([facility])    [address] [place] [address]  [address]  [facility] [id number] [phone]            Apr 04, 2018 10:45 AM CDT   (Arrive by 10:15 AM)   Return Visit with Jael Rubio MD   [facility] Nephrology ([facility])    [address] [place] [address]  [address]  [facility] [id number] [phone]            Jun 06, 2018 12:45 PM CDT   (Arrive by 12:30 PM)   Return Liver Transplant with Toñito Camejo MD   [facility] Hepatology ([facility])    [address] [place] [address]  [address]  [facility] [id number] [phone]              Who to contact     If you have questions or need follow up information about today's clinic visit or your schedule please contact [facility] HEPATOLOGY directly at [phone].  Normal or non-critical lab and imaging results will be communicated to you by MyChart, letter or phone within 4 business days after the clinic has received the results. If you do not hear from us  within 7 days, please contact the clinic through [employer] or phone. If you have a critical or abnormal lab result, we will notify you by phone as soon as possible.  Submit refill requests through [employer] or call your pharmacy and they will forward the refill request to us. Please allow 3 business days for your refill to be completed.          Additional Information About Your Visit        [employer] Information     [employer] gives you secure access to your electronic health record. If you see a primary care provider, you can also send messages to your care team and make appointments. If you have questions, please call your primary care clinic.  If you do not have a primary care provider, please call [phone] and they will assist you.        Care EveryWhere ID     This is your Care EveryWhere ID. This could be used by other organizations to access your [place] medical records  [id number]        Your Vitals Were     Pulse Temperature Height Pulse Oximetry BMI (Body Mass Index)       75 98.3  F (36.8  C) (Oral) 1.829 m (6') 100% 26.8 kg/m2        Blood Pressure from Last 3 Encounters:   03/02/18 157/88   02/28/18 139/87   02/26/18 (!) 160/93    Weight from Last 3 Encounters:   03/02/18 89.6 kg (197 lb 9.6 oz)   02/28/18 88.2 kg (194 lb 6.4 oz)   02/26/18 89.7 kg (197 lb 11.2 oz)              We Performed the Following     OFFICE CONSULTATION,LEVEL IV          Today's Medication Changes          These changes are accurate as of 3/2/18 11:25 AM.  If you have any questions, ask your nurse or doctor.               These medicines have changed or have updated prescriptions.        Dose/Directions    amLODIPine 10 MG tablet   Commonly known as:  NORVASC   This may have changed:  when to take this   Used for:  HTN (hypertension)        Dose:  10 mg   Take 1 tablet (10 mg) by mouth daily   Quantity:  90 tablet   Refills:  3                Primary Care Provider Office Phone # Fax #    Shay Kirkpatrick [phone]        [address] [facility] [address]  [facility] [address]        Equal Access to Services     FRANKLIN PORTILLO : Hadii aad ku hadnairadha Sojose, waaxda luqadaha, qaybta kaalmada gilbertomelodymachelle, devi ramosnerisbev duckworth. So [facility] [phone].    ATENCIÓN: Si habla español, tiene a zheng disposición servicios gratuitos de asistencia lingüística. Ll[facility] [phone].    We comply with applicable federal civil rights laws and [place] laws. We do not discriminate on the basis of race, color, national origin, age, disability, sex, sexual orientation, or gender identity.            Thank you!     Thank you for choosing [facility] HEPATOLOGY  for your care. Our goal is always to provide you with excellent care. Hearing back from our patients is one way we can continue to improve our services. Please take a few minutes to complete the written survey that you may receive in the mail after your visit with us. Thank you!             Your Updated Medication List - Protect others around you: Learn how to safely use, store and throw away your medicines at www.disposemymeds.org.          This list is accurate as of 3/2/18 11:25 AM.  Always use your most recent med list.                   Brand Name Dispense Instructions for use Diagnosis    amLODIPine 10 MG tablet    NORVASC    90 tablet    Take 1 tablet (10 mg) by mouth daily    HTN (hypertension)       cholecalciferol 1000 UNIT tablet    vitamin D3    100 tablet    Take 1 tablet (1,000 Units) by mouth daily    Vitamin D deficiency       CIALIS 5 MG tablet   Generic drug:  tadalafil      Take 5 mg by mouth as needed        cyclobenzaprine 10 MG tablet    FLEXERIL    90 tablet    Take 1 tablet (10 mg) by mouth 3 times daily as needed for muscle spasms    Immunosuppression (H)       furosemide 20 MG tablet    LASIX    200 tablet    Take 2 tablets (40 mg) by mouth 2 times daily    Hyperkalemia       GABAPENTIN PO      Take 300 mg by mouth 3 times daily        insulin glargine 100  UNIT/ML injection    LANTUS     Inject 50 Units Subcutaneous every morning        linagliptin 5 MG Tabs tablet    TRADJENTA     Take 5 mg by mouth daily        linezolid 600 MG tablet    ZYVOX    28 tablet    Take 1 tablet (600 mg) by mouth 2 times daily for 14 days    Local infection of skin and subcutaneous tissue       metoprolol succinate 25 MG 24 hr tablet    TOPROL-XL    90 tablet    Take 1 tablet (25 mg) by mouth daily    Benign essential hypertension       mycophenolic acid 360 MG EC tablet    GENERIC EQUIVALENT    120 tablet    Take 2 tablets (720 mg) by mouth every 12 hours    History of liver transplant (H), Long-term use of immunosuppressant medication       OMEPRAZOLE PO      Take 20 mg by mouth every morning        oxyCODONE IR 10 MG tablet    ROXICODONE    15 tablet    Take 0.5 tablets (5 mg) by mouth every 6 hours as needed for moderate to severe pain    Intra-abdominal infection, Abdominal abscess (H)       predniSONE 2.5 MG tablet    DELTASONE    90 tablet    Take 1 tablet (2.5 mg) by mouth daily    Liver replaced by transplant (H), Long-term use of immunosuppressant medication       sodium bicarbonate 650 MG tablet     180 tablet    Take 1 tablet (650 mg) by mouth 2 times daily    Hyperkalemia       tacrolimus 1 MG capsule    GENERIC EQUIVALENT    60 capsule    Take 1 capsule (1 mg) by mouth every 12 hours    Liver transplant recipient (H)

## 2018-03-02 NOTE — PATIENT INSTRUCTIONS
Return to clinic in 3 months with Dr. Camejo  Continue current immunosuppression regimen  Follow with nephrology  Endocrinology referral placed

## 2018-03-02 NOTE — PROGRESS NOTES
[facility]    Hepatology Follow-up    Dx: Status post liver transplantation for cirrhosis complicated by hepatocellular carcinoma.      HPI:  I had the pleasure of seeing Camacho Bhagat for follow up in the [facility].  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       Unfortunately his post transplant course has been complicated by acute on chronic kidney injury and significant proteinuria for which is he is followed by nephrology.  The patient also did develop somewhat refractory hyperkalemia associated with tacrolimus  treated with florinef and veltassa now improved but not entirely resolved off therapy.  His post-transplant course was further complicated by need for emergent extended right hemicolectomy in the setting of an acute abdomen.  He subsequently underwent ileostomy takedown (1/5/2018) c/b abdominal wall abscess at former drain site and is now s/p EUA with I and D on 2/14/18 with Dr. Dinh. He continues on linezolid and has been followed closely by our surgical colleagues.   Overall reports size of abscess is improving.      Today he feels very well.  He denies abdominal pain, nausea/vomiting.  He is moving his bowels more frequently than usual on linezolid but no diarrhea.  No fevers or chills.  He continues to have a very positive attitude despite all he has been through.      Medications  Current Outpatient Prescriptions   Medication     sodium bicarbonate 650 MG tablet     metoprolol succinate (TOPROL-XL) 25 MG 24 hr tablet     linezolid (ZYVOX) 600 MG tablet     oxyCODONE IR (ROXICODONE) 10 MG tablet     tacrolimus (GENERIC EQUIVALENT) 1 MG capsule     predniSONE (DELTASONE) 2.5 MG tablet     furosemide (LASIX) 20 MG tablet     cholecalciferol (VITAMIN D3) 1000 UNIT tablet     mycophenolic acid  (GENERIC EQUIVALENT) 360 MG EC tablet     amLODIPine (NORVASC) 10 MG tablet     CIALIS 5 MG tablet     OMEPRAZOLE PO     GABAPENTIN PO     insulin glargine (LANTUS) 100 UNIT/ML injection     linagliptin (TRADJENTA) 5 MG TABS tablet     cyclobenzaprine (FLEXERIL) 10 MG tablet     No current facility-administered medications for this visit.        Review of systems  A 10-point review of systems was negative.    Examination  /88  Pulse 75  Temp 98.3  F (36.8  C) (Oral)  Ht 6' (1.829 m)  Wt 197 lb 9.6 oz (89.6 kg)  SpO2 100%  BMI 26.8 kg/m2  Gen- well, NAD, A+Ox3, normal color  CVS- RRR  RS- CTA  Abd- soft, nt, nd, multuple surgical scars, rlq area with induration, no purulent drainage  Extr- warm and well perfused  Neuro- grossly intact, no focal deficits  Skin- no rash  Psych- normal mood  Lym- no LAD    Laboratory  Lab Results   Component Value Date     02/26/2018    POTASSIUM 5.4 02/26/2018    CHLORIDE 102 02/26/2018    CO2 26 02/26/2018    BUN 37 02/26/2018    CR 1.40 02/26/2018       Lab Results   Component Value Date    BILITOTAL 0.3 02/26/2018    ALT 40 02/26/2018    AST 41 02/26/2018    ALKPHOS 180 02/26/2018       Lab Results   Component Value Date    WBC 5.5 02/26/2018    HGB 9.6 02/26/2018    MCV 91 02/26/2018     02/26/2018       Lab Results   Component Value Date    INR 1.05 02/13/2018     Radiology  2/21/18:    1. Complex gas containing collection in the right retroperitoneum, decreased from prior. There is a dense apparent fibrous tract  extending laterally toward the skin surface in this region, along the path of prior drain.  Previously seen fluid component appears to have nearly resolved when compared to prior CT 2/12/2018, and there is no drainable collection.  2. Thickening and edema of the right lateral abdominal pelvic wall musculature with edema throughout the fascial planes. No definable or drainable fluid collection.  3. Indeterminate soft tissue attenuation in the  subcutaneous soft tissues of the right abdominal pelvic wall inferior to prior ostomy  site. This is stable since at least 9/20/2017 and could potentially represent hematoma/contusion or fibrosis among others.   4. Stable to decreased retroperitoneal, mesenteric, and inguinal lymph nodes.  5. Additional incidental findings as above.    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  He has had a complicated post-surgical course as detailed above.      Today the patient appears well.  I am most concerned about his kidney dysfunction and the development of significant proteinuria (~ 9 grams at one point, improved currently).  May be related to immunosuppression, Diabetes, etc.  I appreciate nephrology input and would advocate for a kidney biopsy.  His prior issues with hyperkalemia limit his ability to get and ACE/ARB.  I will not change his immunosuppression pending workup of renal dysfunction.      Recommendations    -appreciate nephrology input re proteinuria, renal dysfunction  -continue with tacrolimus 1 gram BID, mycophenolic acid 720 BID, prednisone 2.5 mg; we will not change his immunosuppression pending workup of renal dysfunction  -endocrinology referral; patient transitioning care  -follow up with colorectal surgery; to complete course of linezolid    Return to clinic with Dr. Camejo in 3 months    Case discussed with Dr. Camejo who is in agreement with the plan of care.  Please don't hesitate to call with ? Or change in clinical status.      Maryanne Ulloa MD  [phone]    The patient was seen and examined.  The above assessment and plan was developed jointly with the fellow.      Toñito Camejo MD      Professor of Medicine  [employer]      Executive Medical Director, Solid Organ Transplant Program  [facility]

## 2018-03-02 NOTE — TELEPHONE ENCOUNTER
Pt calling to get names of endocrinologists he and txp coordinator were talking about. Will have coordinator send him the names in MyChart. Pt verbalized understanding.

## 2018-03-02 NOTE — LETTER
3/2/2018      RE: Camacho Bhagat  [address] [facility] [id number]       [facility]    Hepatology Follow-up    Dx: Status post liver transplantation for cirrhosis complicated by hepatocellular carcinoma.      HPI:  I had the pleasure of seeing Camacho Bhagat for follow up in the [facility].  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       Unfortunately his post transplant course has been complicated by acute on chronic kidney injury and significant proteinuria for which is he is followed by nephrology.  The patient also did develop somewhat refractory hyperkalemia associated with tacrolimus  treated with florinef and veltassa now improved but not entirely resolved off therapy.  His post-transplant course was further complicated by need for emergent extended right hemicolectomy in the setting of an acute abdomen.  He subsequently underwent ileostomy takedown (1/5/2018) c/b abdominal wall abscess at former drain site and is now s/p EUA with I and D on 2/14/18 with Dr. Dinh. He continues on linezolid and has been followed closely by our surgical colleagues.   Overall reports size of abscess is improving.      Today he feels very well.  He denies abdominal pain, nausea/vomiting.  He is moving his bowels more frequently than usual on linezolid but no diarrhea.  No fevers or chills.  He continues to have a very positive attitude despite all he has been through.      Medications  Current Outpatient Prescriptions   Medication     sodium bicarbonate 650 MG tablet     metoprolol succinate (TOPROL-XL) 25 MG 24 hr tablet     linezolid (ZYVOX) 600 MG tablet     oxyCODONE IR (ROXICODONE) 10 MG tablet     tacrolimus (GENERIC EQUIVALENT) 1 MG capsule     predniSONE (DELTASONE) 2.5 MG tablet     furosemide (LASIX) 20 MG  tablet     cholecalciferol (VITAMIN D3) 1000 UNIT tablet     mycophenolic acid (GENERIC EQUIVALENT) 360 MG EC tablet     amLODIPine (NORVASC) 10 MG tablet     CIALIS 5 MG tablet     OMEPRAZOLE PO     GABAPENTIN PO     insulin glargine (LANTUS) 100 UNIT/ML injection     linagliptin (TRADJENTA) 5 MG TABS tablet     cyclobenzaprine (FLEXERIL) 10 MG tablet     No current facility-administered medications for this visit.        Review of systems  A 10-point review of systems was negative.    Examination  /88  Pulse 75  Temp 98.3  F (36.8  C) (Oral)  Ht 6' (1.829 m)  Wt 197 lb 9.6 oz (89.6 kg)  SpO2 100%  BMI 26.8 kg/m2  Gen- well, NAD, A+Ox3, normal color  CVS- RRR  RS- CTA  Abd- soft, nt, nd, multuple surgical scars, rlq area with induration, no purulent drainage  Extr- warm and well perfused  Neuro- grossly intact, no focal deficits  Skin- no rash  Psych- normal mood  Lym- no LAD    Laboratory  Lab Results   Component Value Date     02/26/2018    POTASSIUM 5.4 02/26/2018    CHLORIDE 102 02/26/2018    CO2 26 02/26/2018    BUN 37 02/26/2018    CR 1.40 02/26/2018       Lab Results   Component Value Date    BILITOTAL 0.3 02/26/2018    ALT 40 02/26/2018    AST 41 02/26/2018    ALKPHOS 180 02/26/2018       Lab Results   Component Value Date    WBC 5.5 02/26/2018    HGB 9.6 02/26/2018    MCV 91 02/26/2018     02/26/2018       Lab Results   Component Value Date    INR 1.05 02/13/2018     Radiology  2/21/18:    1. Complex gas containing collection in the right retroperitoneum, decreased from prior. There is a dense apparent fibrous tract  extending laterally toward the skin surface in this region, along the path of prior drain.  Previously seen fluid component appears to have nearly resolved when compared to prior CT 2/12/2018, and there is no drainable collection.  2. Thickening and edema of the right lateral abdominal pelvic wall musculature with edema throughout the fascial planes. No definable or  drainable fluid collection.  3. Indeterminate soft tissue attenuation in the subcutaneous soft tissues of the right abdominal pelvic wall inferior to prior ostomy  site. This is stable since at least 9/20/2017 and could potentially represent hematoma/contusion or fibrosis among others.   4. Stable to decreased retroperitoneal, mesenteric, and inguinal lymph nodes.  5. Additional incidental findings as above.    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  He has had a complicated post-surgical course as detailed above.      Today the patient appears well.  I am most concerned about his kidney dysfunction and the development of significant proteinuria (~ 9 grams at one point, improved currently).  May be related to immunosuppression, Diabetes, etc.  I appreciate nephrology input and would advocate for a kidney biopsy.  His prior issues with hyperkalemia limit his ability to get and ACE/ARB.  I will not change his immunosuppression pending workup of renal dysfunction.      Recommendations    -appreciate nephrology input re proteinuria, renal dysfunction  -continue with tacrolimus 1 gram BID, mycophenolic acid 720 BID, prednisone 2.5 mg; we will not change his immunosuppression pending workup of renal dysfunction  -endocrinology referral; patient transitioning care  -follow up with colorectal surgery; to complete course of linezolid    Return to clinic with Dr. Camejo in 3 months    Case discussed with Dr. Camejo who is in agreement with the plan of care.  Please don't hesitate to call with ? Or change in clinical status.      Maryanne Ulloa MD  [phone]    The patient was seen and examined.  The above assessment and plan was developed jointly with the fellow.      Toñito Camejo MD      Professor of Medicine  [employer]      Executive Medical  Director, Solid Organ Transplant Program  [facility]

## 2018-03-02 NOTE — NURSING NOTE
Chief Complaint   Patient presents with     RECHECK     1 year f/u        Initial /87  Pulse 75  Temp 98.3  F (36.8  C) (Oral)  Ht 1.829 m (6')  Wt 89.6 kg (197 lb 9.6 oz)  SpO2 100%  BMI 26.8 kg/m2 Estimated body mass index is 26.8 kg/(m^2) as calculated from the following:    Height as of this encounter: 1.829 m (6').    Weight as of this encounter: 89.6 kg (197 lb 9.6 oz).  Medication Reconciliation: complete   Randa Britt, CMA

## 2018-03-05 ENCOUNTER — TELEPHONE (OUTPATIENT)
Dept: PHARMACY | Facility: CLINIC | Age: 53
End: 2018-03-05

## 2018-03-05 NOTE — TELEPHONE ENCOUNTER
"Anemia Management Note  SUBJECTIVE/OBJECTIVE:  Referred by Jovanna Foster CNP on 11/6/2017  Primary Diagnosis: Anemia in Chronic Kidney Disease (N18.3, D63.1)     Secondary Diagnosis:  Chronic Kidney Disease, Stage 3 (N18.3)   Hgb goal range:  9-10  Epo/Darbo:  None  Iron regimen: None  Labs exp: 11/7/18   Contact:                      Ok to leave message on cell phone per consent to communicate dated 3/28/17                                                  OK to speak with None on file per consent to communicate dated 3/28/17    Anemia Latest Ref Rng & Units 2/12/2018 2/13/2018 2/14/2018 2/15/2018 2/15/2018 2/21/2018 2/26/2018   DIPAK Dose - - - - - - - -   Hemoglobin 13.3 - 17.7 g/dL 7.9(L) 7.2(L) 7.2(L) 7.3(L) Specimen not collected, see [id number]. 9.3(L) 9.6(L)   TSAT 15 - 46 % - - - - - - -   Ferritin 26 - 388 ng/mL - - - - - - -   PRBCs - - - - - - - -     BP Readings from Last 3 Encounters:   03/02/18 157/88   02/28/18 139/87   02/26/18 (!) 160/93     Wt Readings from Last 2 Encounters:   03/02/18 197 lb 9.6 oz (89.6 kg)   02/28/18 194 lb 6.4 oz (88.2 kg)     Patient has an appt w/ Jovanna Foster on 3/28/18 and with Dr Rubio on 4/14/18    Patient was upset with my call and stated that he does not want me calling him anymore.  \"My doctors are treating me and YOU ARE NOT!\"    ASSESSMENT:  Hgb: at goal - continue to monitor  TSat: not at goal (>30%) but ferritin >500ng/mL.  IV iron not indicated at this time per anemia protocol. Ferritin: At goal (>100ng/mL)    PLAN:  RTC for Hgb ferritin and iron labs in 4 week(s)  Referred to Cecilia Villasenor to contact his providers and DC from Anemia Clinic.  Patient is declining treatment from Anemia Mgmt Services.  Message sent to Jovanna Foster & Dr. Rubio making them aware patient requesting discharge from anemia clinic. 03/06/2018 MAJ Foster, Cinda Jiménez, JUANITO Villasenor, Cecilia MOORE, [facility]                     That's fine Cecilia, he can be discharged. His Hgb is rising on it's " own following his surgery   Thanks   Jovanna         Orders needed to be renewed (for next follow-up date) in EPIC: None    Iron labs due:  now    Plan discussed with:  Camacho  Plan provided by:  Linda    NEXT FOLLOW-UP DATE: patient discharged from anemia service    Anemia Management Service  Cecilia Villasenor PharmD and Estefani Rosales CPhT  Phone: [phone]  Fax: [phone]

## 2018-03-06 ENCOUNTER — OFFICE VISIT (OUTPATIENT)
Dept: SURGERY | Facility: CLINIC | Age: 53
End: 2018-03-06
Payer: COMMERCIAL

## 2018-03-06 VITALS
HEIGHT: 72 IN | BODY MASS INDEX: 25.87 KG/M2 | HEART RATE: 81 BPM | WEIGHT: 191 LBS | DIASTOLIC BLOOD PRESSURE: 83 MMHG | TEMPERATURE: 98.4 F | SYSTOLIC BLOOD PRESSURE: 131 MMHG | OXYGEN SATURATION: 100 %

## 2018-03-06 DIAGNOSIS — N18.30 ANEMIA IN STAGE 3 CHRONIC KIDNEY DISEASE (H): ICD-10-CM

## 2018-03-06 DIAGNOSIS — D63.1 ANEMIA IN STAGE 3 CHRONIC KIDNEY DISEASE (H): ICD-10-CM

## 2018-03-06 DIAGNOSIS — Z09 FOLLOW-UP EXAMINATION FOLLOWING SURGERY: Primary | ICD-10-CM

## 2018-03-06 DIAGNOSIS — N18.30 CKD (CHRONIC KIDNEY DISEASE) STAGE 3, GFR 30-59 ML/MIN (H): ICD-10-CM

## 2018-03-06 DIAGNOSIS — R80.8 OTHER PROTEINURIA: ICD-10-CM

## 2018-03-06 DIAGNOSIS — Z94.4 LIVER REPLACED BY TRANSPLANT (H): ICD-10-CM

## 2018-03-06 DIAGNOSIS — B37.0 ORAL THRUSH: ICD-10-CM

## 2018-03-06 LAB
ALBUMIN SERPL-MCNC: 3.5 G/DL (ref 3.4–5)
ALP SERPL-CCNC: 166 U/L (ref 40–150)
ALT SERPL W P-5'-P-CCNC: 56 U/L (ref 0–70)
ANION GAP SERPL CALCULATED.3IONS-SCNC: 7 MMOL/L (ref 3–14)
AST SERPL W P-5'-P-CCNC: 53 U/L (ref 0–45)
BILIRUB DIRECT SERPL-MCNC: 0.1 MG/DL (ref 0–0.2)
BILIRUB SERPL-MCNC: 0.4 MG/DL (ref 0.2–1.3)
BUN SERPL-MCNC: 39 MG/DL (ref 7–30)
CALCIUM SERPL-MCNC: 8.4 MG/DL (ref 8.5–10.1)
CHLORIDE SERPL-SCNC: 100 MMOL/L (ref 94–109)
CO2 SERPL-SCNC: 25 MMOL/L (ref 20–32)
CREAT SERPL-MCNC: 1.28 MG/DL (ref 0.66–1.25)
CREAT UR-MCNC: 42 MG/DL
ERYTHROCYTE [DISTWIDTH] IN BLOOD BY AUTOMATED COUNT: 16.8 % (ref 10–15)
FERRITIN SERPL-MCNC: 1362 NG/ML (ref 26–388)
GFR SERPL CREATININE-BSD FRML MDRD: 59 ML/MIN/1.7M2
GLUCOSE SERPL-MCNC: 347 MG/DL (ref 70–99)
HCT VFR BLD AUTO: 28.6 % (ref 40–53)
HGB BLD-MCNC: 9.5 G/DL (ref 13.3–17.7)
IRON SATN MFR SERPL: 63 % (ref 15–46)
IRON SERPL-MCNC: 143 UG/DL (ref 35–180)
MAGNESIUM SERPL-MCNC: 1.8 MG/DL (ref 1.6–2.3)
MCH RBC QN AUTO: 30.3 PG (ref 26.5–33)
MCHC RBC AUTO-ENTMCNC: 33.2 G/DL (ref 31.5–36.5)
MCV RBC AUTO: 91 FL (ref 78–100)
PHOSPHATE SERPL-MCNC: 3 MG/DL (ref 2.5–4.5)
PLATELET # BLD AUTO: 57 10E9/L (ref 150–450)
POTASSIUM SERPL-SCNC: 4.9 MMOL/L (ref 3.4–5.3)
PROT SERPL-MCNC: 7.1 G/DL (ref 6.8–8.8)
PROT UR-MCNC: 1.36 G/L
PROT/CREAT 24H UR: 3.2 G/G CR (ref 0–0.2)
RBC # BLD AUTO: 3.14 10E12/L (ref 4.4–5.9)
SODIUM SERPL-SCNC: 132 MMOL/L (ref 133–144)
TACROLIMUS BLD-MCNC: <3 UG/L (ref 5–15)
TIBC SERPL-MCNC: 228 UG/DL (ref 240–430)
TME LAST DOSE: ABNORMAL H
WBC # BLD AUTO: 4.4 10E9/L (ref 4–11)

## 2018-03-06 ASSESSMENT — PAIN SCALES - GENERAL: PAINLEVEL: NO PAIN (0)

## 2018-03-06 NOTE — PROGRESS NOTES
Colon and Rectal Surgery Postoperative Clinic Note    RE: Camacho Bhagat  : 1965  BISI: 3/6/2018    Camacho Bhagat is a very pleasant 52 year old male with history of liver transplant (3/2017) who underwent emergent extended right hemicolectomy in the setting of an acute abdomen. He underwent ileostomy takedown (2018) c/b abdominal wall abscess at former drain site and is now s/p EUA with I and D on 18 with Dr. Dinh. He presents today for follow up.    Interval history: Camacho reports that the site near his prior drain is getting smaller. He denies any further drainage or pain. He denies any fevers or chills. The midline incision remains open and he is putting a dressing on this daily. He is having loose to semi formed stools 3-4 times a day. He thinks he has developed thrush from being on antibiotics.    Assessment/Plan:  On exam the firmness in the RLQ is almost completely resolved. Site is now healed externally and no drainage. Non tender. Midline incision with small opening with hypergranulation tissue. Silver nitrate applied. Will have him try nystatin swish and swallow for oral thrush. Asked him to notify the clinic if his loose stools are worsening or if he develops any increased pain, fevers, or chills off the antibiotics. Will have him follow up with Dr. Dinh in 3-4 weeks. Patient's questions were answered to his stated satisfaction and he is in agreement with this plan.    Medical history:  Past Medical History:   Diagnosis Date     Depressive disorder, not elsewhere classified      Diabetes type 2, controlled (H) 11/10/2016     Esophageal reflux      Fibromyalgia 2009    dx with Dr Benitez( Rheum)     Gangrene of finger (H) 2017     H/O deep venous thrombosis 2001    Permanent IVC filter in place.     H/O CASTAÑEDA (nonalcoholic steatohepatitis)      H/O Pneumonia, organism unspecified(486) 10/2001    Included ARDS, sepsis, and  acute renal failure; hospitalized, required  tracheostomy placement.     H/O: HTN (hypertension) 11/2001    No longer prescribed antihypertensive medication.       History of hepatocellular carcinoma      History of liver transplant (H)      History of obstructive sleep apnea     No longer wears CPAP since losing approximately 200 pounds with his liver transplant and its complications.       HLD (hyperlipidemia)      Ischemia of both lower extremities 8/25/2017    Distal ischemia due to shock/high pressor requirements     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     Osteoarthritis      Presence of PERMANENT IVC filter      Rheumatoid arthritis(714.0)        Surgical history:  Past Surgical History:   Procedure Laterality Date     BENCH LIVER N/A 3/4/2017    Procedure: BENCH LIVER;  Surgeon: Jovan Tran MD;  Location: [facility]     C TOTAL KNEE ARTHROPLASTY  2008    Right knee arthroscopy     CHOLECYSTECTOMY       COLONOSCOPY N/A 7/21/2017    Procedure: COMBINED COLONOSCOPY, SINGLE OR MULTIPLE BIOPSY/POLYPECTOMY BY BIOPSY;  Colonoscopy;  Surgeon: Izaiah Montes MD;  Location:  GI     ENT SURGERY      Repair of deviated septum     ESOPHAGOSCOPY, GASTROSCOPY, DUODENOSCOPY (EGD), COMBINED N/A 8/4/2016    Procedure: COMBINED ESOPHAGOSCOPY, GASTROSCOPY, DUODENOSCOPY (EGD), BIOPSY SINGLE OR MULTIPLE;  Surgeon: Trent Pederson MD;  Location:  GI     ESOPHAGOSCOPY, GASTROSCOPY, DUODENOSCOPY (EGD), COMBINED N/A 8/27/2017    Procedure: COMBINED ESOPHAGOSCOPY, GASTROSCOPY, DUODENOSCOPY (EGD);;  Surgeon: Los Wynn MD;  Location: [facility]     INCISION AND DRAINAGE ABDOMEN WASHOUT, COMBINED Right 2/14/2018    Procedure: COMBINED INCISION AND DRAINAGE ABDOMEN WASHOUT;  COMBINED INCISION AND DRAINAGE right ABDOMEN flank;  Surgeon: Sara Dinh MD;  [facility]: [facility]     LAPAROTOMY EXPLORATORY N/A 7/4/2017    Procedure: LAPAROTOMY EXPLORATORY;  Exploratory Laparotomy, washout;  Surgeon: Tip Zhang MD;  [facility]: [facility]     LAPAROTOMY  EXPLORATORY N/A 2017    Procedure: LAPAROTOMY EXPLORATORY;  Exploratory Laparotomy, Washout with closure.;  Surgeon: Tip Zhang MD;  [facility]     LAPAROTOMY EXPLORATORY N/A 2017    Procedure: LAPAROTOMY EXPLORATORY;  Exploratory Laparotomy, Right angelique-colectomy, end ileostomy, mucosal fistula, partial omentectomy;  Surgeon: Sara Dinh MD;  Location: [facility]     ORTHOPEDIC SURGERY Right     Repair of dislocated wrist.       RELEASE TRIGGER FINGER Right 11/10/2017    Procedure: RELEASE TRIGGER FINGER;  Debride, V-Y Flap Right Index Finger;  Surgeon: Momo Noonan MD;  [facility]     TAKEDOWN ILEOSTOMY N/A 2018    Procedure: TAKEDOWN ILEOSTOMY;  Exploratory Laparotomy, Lysis of Adhesions, Takedown Of End Ileostomy, Takedown of mucocutaneous fistula, ileocolic resection  And Colorectal Anastomosis, Primary repair of Ventral Hernia, Anesthesia Block ;  Surgeon: Sara Dinh MD;  Location: [facility]     TRACHEOSTOMY  2001    During hospitalization for pneumonia.       TRANSPLANT LIVER RECIPIENT  DONOR N/A 3/4/2017    Procedure: TRANSPLANT LIVER RECIPIENT  DONOR;  Surgeon: Jovan Tran MD;  [facility]: [facility]       Problem list:    Patient Active Problem List    Diagnosis Date Noted     Abscess, intra-abdominal, postoperative (H) 2018     Priority: Medium     Ileostomy in place (H) 2018     Priority: Medium     Peristomal skin complication 2017     Priority: Medium     Painful periwound skin 2017     Priority: Medium     Encounter for attention to ileostomy (H) 2017     Priority: Medium     History of creation of ostomy 10/30/2017     Priority: Medium     Elevated serum creatinine 10/16/2017     Priority: Medium     Pancytopenia (H) 2017     Priority: Medium     Gangrene of finger (H) 2017     Priority: Medium     Ischemia of both lower extremities 2017     Priority: Medium     Distal  ischemia due to shock/high pressor requirements       S/P liver transplant (H) 07/26/2017     Priority: Medium     Neutropenic colitis (H) 07/04/2017     Priority: Medium     Immunosuppressed status (H) 03/10/2017     Priority: Medium     Liver transplant recipient (H) 03/04/2017     Priority: Medium     Hyperkalemia 02/14/2017     Priority: Medium     Hepatocellular carcinoma (H) 01/27/2016     Priority: Medium     Osteoarthritis 01/18/2015     Priority: Medium     Rheumatoid arthritis (H) 01/18/2015     Priority: Medium     Medication refill- do not delete  11/13/2013     Priority: Medium     Fibromyalgia 08/15/2011     Priority: Medium     Sicca syndrome (H) 08/15/2011     Priority: Medium     Testicular hypofunction      Priority: Medium     Problem list name updated by automated process. Provider to review       Carpal tunnel syndrome 07/08/2002     Priority: Medium       Medications:  Current Outpatient Prescriptions   Medication Sig Dispense Refill     nystatin (MYCOSTATIN) 011082 unit/mL SUSP suspension Swish and swallow 0.5 mLs (50,000 Units) in mouth 4 times daily for 7 days 60 mL 3     sodium bicarbonate 650 MG tablet Take 1 tablet (650 mg) by mouth 2 times daily 180 tablet 3     metoprolol succinate (TOPROL-XL) 25 MG 24 hr tablet Take 1 tablet (25 mg) by mouth daily 90 tablet 3     linezolid (ZYVOX) 600 MG tablet Take 1 tablet (600 mg) by mouth 2 times daily for 14 days 28 tablet 0     oxyCODONE IR (ROXICODONE) 10 MG tablet Take 0.5 tablets (5 mg) by mouth every 6 hours as needed for moderate to severe pain 15 tablet 0     tacrolimus (GENERIC EQUIVALENT) 1 MG capsule Take 1 capsule (1 mg) by mouth every 12 hours 60 capsule 11     predniSONE (DELTASONE) 2.5 MG tablet Take 1 tablet (2.5 mg) by mouth daily 90 tablet 3     furosemide (LASIX) 20 MG tablet Take 2 tablets (40 mg) by mouth 2 times daily 200 tablet 3     cholecalciferol (VITAMIN D3) 1000 UNIT tablet Take 1 tablet (1,000 Units) by mouth daily 100  tablet 3     mycophenolic acid (GENERIC EQUIVALENT) 360 MG EC tablet Take 2 tablets (720 mg) by mouth every 12 hours 120 tablet 3     amLODIPine (NORVASC) 10 MG tablet Take 1 tablet (10 mg) by mouth daily (Patient taking differently: Take 10 mg by mouth every morning ) 90 tablet 3     CIALIS 5 MG tablet Take 5 mg by mouth as needed   1     OMEPRAZOLE PO Take 20 mg by mouth every morning        GABAPENTIN PO Take 300 mg by mouth 3 times daily       insulin glargine (LANTUS) 100 UNIT/ML injection Inject 50 Units Subcutaneous every morning       linagliptin (TRADJENTA) 5 MG TABS tablet Take 5 mg by mouth daily        cyclobenzaprine (FLEXERIL) 10 MG tablet Take 1 tablet (10 mg) by mouth 3 times daily as needed for muscle spasms 90 tablet 0       Allergies:  Allergies   Allergen Reactions     Erythromycin GI Disturbance     Vioxx      Nausea, vomiting       Family history:  Family History   Problem Relation Age of Onset     DIABETES Father      Hypertension Father      Substance Abuse Father      Arthritis Mother      Thyroid Cancer Mother      Survivor!     Cervical Cancer Maternal Grandmother      CEREBROVASCULAR DISEASE Maternal Grandfather      Prostate Cancer Paternal Grandfather      Colon Cancer No family hx of      Hyperlipidemia No family hx of      Coronary Artery Disease No family hx of      Breast Cancer No family hx of        Social history:  Social History   Substance Use Topics     Smoking status: Former Smoker     Packs/day: 0.75     Years: 2.00     Quit date: 1988     Smokeless tobacco: Former User     Types: Chew     Quit date: 10/8/2015     Alcohol use No      Comment: No alcohol since liver transplant.       Marital status: .    Nursing Notes:   Yonathan Vasquez LPN  3/6/2018 10:58 AM  Signed  Chief Complaint   Patient presents with     Surgical Followup     Post op visit.        Vitals:    03/06/18 1056   BP: 131/83   BP Location: Left arm   Patient Position: Chair   Cuff Size: Adult Regular    Pulse: 81   Temp: 98.4  F (36.9  C)   TempSrc: Oral   SpO2: 100%   Weight: 191 lb   Height: 6'       Body mass index is 25.9 kg/(m^2).    Jojog X, LPN                     Physical Examination:   /83 (BP Location: Left arm, Patient Position: Chair, Cuff Size: Adult Regular)  Pulse 81  Temp 98.4  F (36.9  C) (Oral)  Ht 6'  Wt 191 lb  SpO2 100%  BMI 25.9 kg/m2  General: alert, oriented, in no acute distress, sitting comfortably  HEENT: mucous membranes moist  Abdomen: No further induration in the RLQ. External site healed. Non tender. No erythema. Additional small opening in midline with hypergranulation tissue. Silver nitrate applied.    Total face to face time was 10 minutes, >50% counseling.  This is a postop visit.    LAMIN Queen, NP-C  Colon and Rectal Surgery  [facility]    This note was created using speech recognition software and may contain unintended word substitutions.

## 2018-03-06 NOTE — LETTER
3/6/2018      RE: Camacho Bhagat  [address] [facility] [place] [id number]       Colon and Rectal Surgery Postoperative Clinic Note    RE: Camacho Bhagat  : 1965  BISI: 3/6/2018    Camacho Bhagat is a very pleasant 52 year old male with history of liver transplant (3/2017) who underwent emergent extended right hemicolectomy in the setting of an acute abdomen. He underwent ileostomy takedown (2018) c/b abdominal wall abscess at former drain site and is now s/p EUA with I and D on 18 with Dr. Dinh. He presents today for follow up.    Interval history: Camacho reports that the site near his prior drain is getting smaller. He denies any further drainage or pain. He denies any fevers or chills. The midline incision remains open and he is putting a dressing on this daily. He is having loose to semi formed stools 3-4 times a day. He thinks he has developed thrush from being on antibiotics.    Assessment/Plan:  On exam the firmness in the RLQ is almost completely resolved. Site is now healed externally and no drainage. Non tender. Midline incision with small opening with hypergranulation tissue. Silver nitrate applied. Will have him try nystatin swish and swallow for oral thrush. Asked him to notify the clinic if his loose stools are worsening or if he develops any increased pain, fevers, or chills off the antibiotics. Will have him follow up with Dr. Dinh in 3-4 weeks. Patient's questions were answered to his stated satisfaction and he is in agreement with this plan.    Medical history:  Past Medical History:   Diagnosis Date     Depressive disorder, not elsewhere classified      Diabetes type 2, controlled (H) 11/10/2016     Esophageal reflux      Fibromyalgia 2009    dx with Dr Benitez( Rheum)     Gangrene of finger (H) 2017     H/O deep venous thrombosis 2001    Permanent IVC filter in place.     H/O CASTAÑEDA (nonalcoholic steatohepatitis)      H/O Pneumonia, organism unspecified(486)  10/2001    Included ARDS, sepsis, and  acute renal failure; hospitalized, required tracheostomy placement.     H/O: HTN (hypertension) 11/2001    No longer prescribed antihypertensive medication.       History of hepatocellular carcinoma      History of liver transplant (H)      History of obstructive sleep apnea     No longer wears CPAP since losing approximately 200 pounds with his liver transplant and its complications.       HLD (hyperlipidemia)      Ischemia of both lower extremities 8/25/2017    Distal ischemia due to shock/high pressor requirements     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     Osteoarthritis      Presence of PERMANENT IVC filter      Rheumatoid arthritis(714.0)        Surgical history:  Past Surgical History:   Procedure Laterality Date     BENCH LIVER N/A 3/4/2017    Procedure: BENCH LIVER;  Surgeon: Jovan Tran MD;  Location:  OR      TOTAL KNEE ARTHROPLASTY  2008    Right knee arthroscopy     CHOLECYSTECTOMY       COLONOSCOPY N/A 7/21/2017    Procedure: COMBINED COLONOSCOPY, SINGLE OR MULTIPLE BIOPSY/POLYPECTOMY BY BIOPSY;  Colonoscopy;  Surgeon: Izaiah Montes MD;  Location:  GI     ENT SURGERY      Repair of deviated septum     ESOPHAGOSCOPY, GASTROSCOPY, DUODENOSCOPY (EGD), COMBINED N/A 8/4/2016    Procedure: COMBINED ESOPHAGOSCOPY, GASTROSCOPY, DUODENOSCOPY (EGD), BIOPSY SINGLE OR MULTIPLE;  Surgeon: Trent Pederson MD;  Location:  GI     ESOPHAGOSCOPY, GASTROSCOPY, DUODENOSCOPY (EGD), COMBINED N/A 8/27/2017    Procedure: COMBINED ESOPHAGOSCOPY, GASTROSCOPY, DUODENOSCOPY (EGD);;  Surgeon: Los Wynn MD;  [facility]     INCISION AND DRAINAGE ABDOMEN WASHOUT, COMBINED Right 2/14/2018    Procedure: COMBINED INCISION AND DRAINAGE ABDOMEN WASHOUT;  COMBINED INCISION AND DRAINAGE right ABDOMEN flank;  Surgeon: Sara Dinh MD;  [facility]     LAPAROTOMY EXPLORATORY N/A 7/4/2017    Procedure: LAPAROTOMY EXPLORATORY;  Exploratory  Laparotomy, washout;  Surgeon: Tip Zhang MD;  [facility]     LAPAROTOMY EXPLORATORY N/A 2017    Procedure: LAPAROTOMY EXPLORATORY;  Exploratory Laparotomy, Washout with closure.;  Surgeon: Tip Zhang MD;  [facility]     LAPAROTOMY EXPLORATORY N/A 2017    Procedure: LAPAROTOMY EXPLORATORY;  Exploratory Laparotomy, Right angelique-colectomy, end ileostomy, mucosal fistula, partial omentectomy;  Surgeon: Sara Dinh MD;  [facility]: [facility]     ORTHOPEDIC SURGERY Right     Repair of dislocated wrist.       RELEASE TRIGGER FINGER Right 11/10/2017    Procedure: RELEASE TRIGGER FINGER;  Debride, V-Y Flap Right Index Finger;  Surgeon: Momo Noonan MD;  [facility]     TAKEDOWN ILEOSTOMY N/A 2018    Procedure: TAKEDOWN ILEOSTOMY;  Exploratory Laparotomy, Lysis of Adhesions, Takedown Of End Ileostomy, Takedown of mucocutaneous fistula, ileocolic resection  And Colorectal Anastomosis, Primary repair of Ventral Hernia, Anesthesia Block ;  Surgeon: Sara Dinh MD;  [facility]: [facility]     TRACHEOSTOMY  2001    During hospitalization for pneumonia.       TRANSPLANT LIVER RECIPIENT  DONOR N/A 3/4/2017    Procedure: TRANSPLANT LIVER RECIPIENT  DONOR;  Surgeon: Jovan Tran MD;  [facility]       Problem list:    Patient Active Problem List    Diagnosis Date Noted     Abscess, intra-abdominal, postoperative (H) 2018     Priority: Medium     Ileostomy in place (H) 2018     Priority: Medium     Peristomal skin complication 2017     Priority: Medium     Painful periwound skin 2017     Priority: Medium     Encounter for attention to ileostomy (H) 2017     Priority: Medium     History of creation of ostomy 10/30/2017     Priority: Medium     Elevated serum creatinine 10/16/2017     Priority: Medium     Pancytopenia (H) 2017     Priority: Medium     Gangrene of finger (H) 2017     Priority:  Medium     Ischemia of both lower extremities 08/25/2017     Priority: Medium     Distal ischemia due to shock/high pressor requirements       S/P liver transplant (H) 07/26/2017     Priority: Medium     Neutropenic colitis (H) 07/04/2017     Priority: Medium     Immunosuppressed status (H) 03/10/2017     Priority: Medium     Liver transplant recipient (H) 03/04/2017     Priority: Medium     Hyperkalemia 02/14/2017     Priority: Medium     Hepatocellular carcinoma (H) 01/27/2016     Priority: Medium     Osteoarthritis 01/18/2015     Priority: Medium     Rheumatoid arthritis (H) 01/18/2015     Priority: Medium     Medication refill- do not delete  11/13/2013     Priority: Medium     Fibromyalgia 08/15/2011     Priority: Medium     Sicca syndrome (H) 08/15/2011     Priority: Medium     Testicular hypofunction      Priority: Medium     Problem list name updated by automated process. Provider to review       Carpal tunnel syndrome 07/08/2002     Priority: Medium       Medications:  Current Outpatient Prescriptions   Medication Sig Dispense Refill     nystatin (MYCOSTATIN) 508361 unit/mL SUSP suspension Swish and swallow 0.5 mLs (50,000 Units) in mouth 4 times daily for 7 days 60 mL 3     sodium bicarbonate 650 MG tablet Take 1 tablet (650 mg) by mouth 2 times daily 180 tablet 3     metoprolol succinate (TOPROL-XL) 25 MG 24 hr tablet Take 1 tablet (25 mg) by mouth daily 90 tablet 3     linezolid (ZYVOX) 600 MG tablet Take 1 tablet (600 mg) by mouth 2 times daily for 14 days 28 tablet 0     oxyCODONE IR (ROXICODONE) 10 MG tablet Take 0.5 tablets (5 mg) by mouth every 6 hours as needed for moderate to severe pain 15 tablet 0     tacrolimus (GENERIC EQUIVALENT) 1 MG capsule Take 1 capsule (1 mg) by mouth every 12 hours 60 capsule 11     predniSONE (DELTASONE) 2.5 MG tablet Take 1 tablet (2.5 mg) by mouth daily 90 tablet 3     furosemide (LASIX) 20 MG tablet Take 2 tablets (40 mg) by mouth 2 times daily 200 tablet 3      cholecalciferol (VITAMIN D3) 1000 UNIT tablet Take 1 tablet (1,000 Units) by mouth daily 100 tablet 3     mycophenolic acid (GENERIC EQUIVALENT) 360 MG EC tablet Take 2 tablets (720 mg) by mouth every 12 hours 120 tablet 3     amLODIPine (NORVASC) 10 MG tablet Take 1 tablet (10 mg) by mouth daily (Patient taking differently: Take 10 mg by mouth every morning ) 90 tablet 3     CIALIS 5 MG tablet Take 5 mg by mouth as needed   1     OMEPRAZOLE PO Take 20 mg by mouth every morning        GABAPENTIN PO Take 300 mg by mouth 3 times daily       insulin glargine (LANTUS) 100 UNIT/ML injection Inject 50 Units Subcutaneous every morning       linagliptin (TRADJENTA) 5 MG TABS tablet Take 5 mg by mouth daily        cyclobenzaprine (FLEXERIL) 10 MG tablet Take 1 tablet (10 mg) by mouth 3 times daily as needed for muscle spasms 90 tablet 0       Allergies:  Allergies   Allergen Reactions     Erythromycin GI Disturbance     Vioxx      Nausea, vomiting       Family history:  Family History   Problem Relation Age of Onset     DIABETES Father      Hypertension Father      Substance Abuse Father      Arthritis Mother      Thyroid Cancer Mother      Survivor!     Cervical Cancer Maternal Grandmother      CEREBROVASCULAR DISEASE Maternal Grandfather      Prostate Cancer Paternal Grandfather      Colon Cancer No family hx of      Hyperlipidemia No family hx of      Coronary Artery Disease No family hx of      Breast Cancer No family hx of        Social history:  Social History   Substance Use Topics     Smoking status: Former Smoker     Packs/day: 0.75     Years: 2.00     Quit date: 1988     Smokeless tobacco: Former User     Types: Chew     Quit date: 10/8/2015     Alcohol use No      Comment: No alcohol since liver transplant.       Marital status: .    Nursing Notes:   Yonathan Vasquez LPN  3/6/2018 10:58 AM  Signed  Chief Complaint   Patient presents with     Surgical Followup     Post op visit.        Vitals:    03/06/18  1056   BP: 131/83   BP Location: Left arm   Patient Position: Chair   Cuff Size: Adult Regular   Pulse: 81   Temp: 98.4  F (36.9  C)   TempSrc: Oral   SpO2: 100%   Weight: 191 lb   Height: 6'       Body mass index is 25.9 kg/(m^2).    Blayne X, MAYNORN                     Physical Examination:   /83 (BP Location: Left arm, Patient Position: Chair, Cuff Size: Adult Regular)  Pulse 81  Temp 98.4  F (36.9  C) (Oral)  Ht 6'  Wt 191 lb  SpO2 100%  BMI 25.9 kg/m2  General: alert, oriented, in no acute distress, sitting comfortably  HEENT: mucous membranes moist  Abdomen: No further induration in the RLQ. External site healed. Non tender. No erythema. Additional small opening in midline with hypergranulation tissue. Silver nitrate applied.    Total face to face time was 10 minutes, >50% counseling.  This is a postop visit.    LAMIN Queen, NP-C  Colon and Rectal Surgery  [facility]    This note was created using speech recognition software and may contain unintended word substitutions.        LAMIN Queen CNP

## 2018-03-06 NOTE — NURSING NOTE
Chief Complaint   Patient presents with     Surgical Followup     Post op visit.        Vitals:    03/06/18 1056   BP: 131/83   BP Location: Left arm   Patient Position: Chair   Cuff Size: Adult Regular   Pulse: 81   Temp: 98.4  F (36.9  C)   TempSrc: Oral   SpO2: 100%   Weight: 191 lb   Height: 6'       Body mass index is 25.9 kg/(m^2).    Blayne HENRY LPN

## 2018-03-06 NOTE — MR AVS SNAPSHOT
After Visit Summary   3/6/2018    Camacho Bhagat    MRN: [id number]           Patient Information     Date Of Birth          1965        Visit Information        Provider Department      3/6/2018 12:00 PM Caryn Parikh APRN CNP  [facility]        Today's Diagnoses     Follow-up examination following surgery    -  1    Oral thrush           Follow-ups after your visit        Your next 10 appointments already scheduled     Mar 06, 2018 12:00 PM CST   (Arrive by 11:45 AM)   Post-Op with LAMIN Cabrera CNP   [facility] ([facility])    [address]  4th Floor  [facility] [id number] [phone]            Mar 28, 2018  1:00 PM CDT   (Arrive by 12:30 PM)   Return Visit with Cinda Cazares NP   [facility] ([facility])    [address]  [address]  [facility] [id number] [phone]            Mar 28, 2018  2:00 PM CDT   (Arrive by 1:45 PM)   Return Visit with Sara Dinh MD   [facility] ([facility])    [address]  4th Floor  [facility] [id number] [phone]            Apr 04, 2018 10:45 AM CDT   (Arrive by 10:15 AM)   Return Visit with Jael Rubio MD   [facility] ([facility])    [address]  [address]  [facility] [id number] [phone]            Jun 06, 2018 12:45 PM CDT   (Arrive by 12:30 PM)   Return Liver Transplant with Toñito Camejo MD   [facility]at[facility] ([facility])    [address]  [address]  [facility] [id number] [phone]              Who to contact     Please call your clinic at [phone] to:    Ask questions about your health    Make or cancel appointments    Discuss your medicines    Learn about your test results    Speak to your  doctor            Additional Information About Your Visit        [employer] Information     [facility] gives you secure access to your electronic health record. If you see a primary care provider, you can also send messages to your care team and make appointments. If you have questions, please call your primary care clinic.  If you do not have a primary care provider, please call [phone] and they will assist you.      [facility] is an electronic gateway that provides easy, online access to your medical records. With [facility], you can request a clinic appointment, read your test results, renew a prescription or communicate with your care team.     To access your existing account, please contact your [facility] or call [phone] for assistance.        Care EveryWhere ID     This is your Care EveryWhere ID. This could be used by other organizations to access your [place] medical records  [id number]        Your Vitals Were     Pulse Temperature Height Pulse Oximetry BMI (Body Mass Index)       81 98.4  F (36.9  C) (Oral) 6' 100% 25.9 kg/m2        Blood Pressure from Last 3 Encounters:   03/06/18 131/83   03/02/18 157/88   02/28/18 139/87    Weight from Last 3 Encounters:   03/06/18 191 lb   03/02/18 197 lb 9.6 oz   02/28/18 194 lb 6.4 oz              Today, you had the following     No orders found for display         Today's Medication Changes          These changes are accurate as of 3/6/18 11:14 AM.  If you have any questions, ask your nurse or doctor.               Start taking these medicines.        Dose/Directions    nystatin 501399 unit/mL Susp suspension   Commonly known as:  MYCOSTATIN   Used for:  Oral thrush   Started by:  Caryn Parikh APRN CNP        Dose:  63789 Units   Swish and swallow 0.5 mLs (50,000 Units) in mouth 4 times daily for 7 days   Quantity:  60 mL   Refills:  3         These medicines have changed or have updated prescriptions.         Dose/Directions    amLODIPine 10 MG tablet   Commonly known as:  NORVASC   This may have changed:  when to take this   Used for:  HTN (hypertension)        Dose:  10 mg   Take 1 tablet (10 mg) by mouth daily   Quantity:  90 tablet   Refills:  3            Where to get your medicines      These medications were sent to [facility] [id number] [place] [address] - [address] [place] [address] AT [address]  [address] [facility] [address]     Phone:  [phone]     nystatin 422448 unit/mL Susp suspension                Primary Care Provider Office Phone # Fax #    Shay Kirkpatrick [phone]       [address] [place] [address]  [facility] [address]        Equal Access to Services     FRANKLIN PORTILLO : Hadii aad ku hadasho Soomaali, waaxda luqadaha, qaybta kaalmada adeegyada, waxamanuel craig . So [facility] [phone].    ATENCIÓN: Si habla español, tiene a zheng disposición servicios gratuitos de asistencia lingüística. [facility] [phone].    We comply with applicable federal civil rights laws and [place] laws. We do not discriminate on the basis of race, color, national origin, age, disability, sex, sexual orientation, or gender identity.            Thank you!     Thank you for choosing [facility]  for your care. Our goal is always to provide you with excellent care. Hearing back from our patients is one way we can continue to improve our services. Please take a few minutes to complete the written survey that you may receive in the mail after your visit with us. Thank you!             Your Updated Medication List - Protect others around you: Learn how to safely use, store and throw away your medicines at www.disposemymeds.org.          This list is accurate as of 3/6/18 11:14 AM.  Always use your most recent med list.                   Brand Name Dispense Instructions for use Diagnosis    amLODIPine 10 MG tablet    NORVASC    90 tablet    Take 1 tablet  (10 mg) by mouth daily    HTN (hypertension)       cholecalciferol 1000 UNIT tablet    vitamin D3    100 tablet    Take 1 tablet (1,000 Units) by mouth daily    Vitamin D deficiency       CIALIS 5 MG tablet   Generic drug:  tadalafil      Take 5 mg by mouth as needed        cyclobenzaprine 10 MG tablet    FLEXERIL    90 tablet    Take 1 tablet (10 mg) by mouth 3 times daily as needed for muscle spasms    Immunosuppression (H)       furosemide 20 MG tablet    LASIX    200 tablet    Take 2 tablets (40 mg) by mouth 2 times daily    Hyperkalemia       GABAPENTIN PO      Take 300 mg by mouth 3 times daily        insulin glargine 100 UNIT/ML injection    LANTUS     Inject 50 Units Subcutaneous every morning        linagliptin 5 MG Tabs tablet    TRADJENTA     Take 5 mg by mouth daily        linezolid 600 MG tablet    ZYVOX    28 tablet    Take 1 tablet (600 mg) by mouth 2 times daily for 14 days    Local infection of skin and subcutaneous tissue       metoprolol succinate 25 MG 24 hr tablet    TOPROL-XL    90 tablet    Take 1 tablet (25 mg) by mouth daily    Benign essential hypertension       mycophenolic acid 360 MG EC tablet    GENERIC EQUIVALENT    120 tablet    Take 2 tablets (720 mg) by mouth every 12 hours    History of liver transplant (H), Long-term use of immunosuppressant medication       nystatin 597005 unit/mL Susp suspension    MYCOSTATIN    60 mL    Swish and swallow 0.5 mLs (50,000 Units) in mouth 4 times daily for 7 days    Oral thrush       OMEPRAZOLE PO      Take 20 mg by mouth every morning        oxyCODONE IR 10 MG tablet    ROXICODONE    15 tablet    Take 0.5 tablets (5 mg) by mouth every 6 hours as needed for moderate to severe pain    Intra-abdominal infection, Abdominal abscess (H)       predniSONE 2.5 MG tablet    DELTASONE    90 tablet    Take 1 tablet (2.5 mg) by mouth daily    Liver replaced by transplant (H), Long-term use of immunosuppressant medication       sodium bicarbonate 650 MG tablet      180 tablet    Take 1 tablet (650 mg) by mouth 2 times daily    Hyperkalemia       tacrolimus 1 MG capsule    GENERIC EQUIVALENT    60 capsule    Take 1 capsule (1 mg) by mouth every 12 hours    Liver transplant recipient (H)

## 2018-03-07 LAB
C3 SERPL-MCNC: 103 MG/DL (ref 76–169)
C4 SERPL-MCNC: 27 MG/DL (ref 15–50)

## 2018-03-08 DIAGNOSIS — Z94.4 HISTORY OF LIVER TRANSPLANT (H): ICD-10-CM

## 2018-03-08 DIAGNOSIS — Z79.60 LONG-TERM USE OF IMMUNOSUPPRESSANT MEDICATION: Primary | ICD-10-CM

## 2018-03-08 LAB — CH50 SERPL-ACNC: 68 CAE UNITS (ref 60–144)

## 2018-03-14 ENCOUNTER — TELEPHONE (OUTPATIENT)
Dept: TRANSPLANT | Facility: CLINIC | Age: 53
End: 2018-03-14

## 2018-03-14 ENCOUNTER — TELEPHONE (OUTPATIENT)
Dept: ENDOCRINOLOGY | Facility: CLINIC | Age: 53
End: 2018-03-14

## 2018-03-14 LAB
FUNGUS SPEC CULT: NORMAL
Lab: NORMAL
SPECIMEN SOURCE: NORMAL

## 2018-03-14 NOTE — TELEPHONE ENCOUNTER
Camacho called and left VM on Liver POD line- 4/13/18- 4:30 pm- Would like Cheryl to call him back- referring to Endocrinologist appoinment

## 2018-03-14 NOTE — TELEPHONE ENCOUNTER
"I have asked both Dr. Anneliese Mast  and Dr West  about seeing this patient  and they have no openings until summer. He can schedule but it will be first available . I see that they declined Malaeb due to possible  \"accent and trouble hearing\"   but I had told them when scheduling that  this t should not be an issue. They cancelled the appointment  anyhow . If he wants to see Dr Anneliese Mast or Dr West he will need to wait until first available  or cancel which  May not be until June or July. Kiana Ching may be an option . I will forward to  clinic coordinators.   "

## 2018-03-14 NOTE — TELEPHONE ENCOUNTER
----- Message from Alisa West MD sent at 3/14/2018  2:19 PM CDT -----  Regarding: RE: NEW PATIENT HELP  Contact: [phone]  His BG is in the 300s on recent labs. Monday I already overbooked, then I'm gone on vacation next 2 weeks. I think he needs to be sooner than my next VINCE, which is end of April.       ----- Message -----     From: Char Bass RN     Sent: 3/14/2018   1:55 PM       To: Alisa West MD  Subject: FW: NEW PATIENT HELP                             Is this someone would consider seeing  In a VINCE spot NEW ? You and Anneliese first available regular new is in the summer.   ----- Message -----     From: Lu Candelario     Sent: 3/14/2018  11:30 AM       To: [facility]  Subject: NEW PATIENT HELP                                 Patient has a referral to be seen for DX Long-term use of immunosuppressant medication & History of liver transplant, patient was scheduled with Dr Lester but doesn't want to see her he wants to see a endo doc that possibly knows more about the liver and kidneys and the transplant process as he last endo doc that retired had to do a lot research to find a treatment plan that works.  Patient wanted to see either Pro or Brett a a recommendation by his transplant coordinator.    I don't know if any of them do this please call @ [phone] or message pt on his MyChart regarding this.     Thank you!    Lu Candelario  Intake representative   Call Center    Please DO NOT send this message and/or reply back to sender.  Call Center Representatives DO NOT respond to messages.

## 2018-03-14 NOTE — TELEPHONE ENCOUNTER
----- Message from Alisa West MD sent at 3/14/2018  2:19 PM CDT -----  Regarding: RE: NEW PATIENT HELP  Contact: [phone]  His BG is in the 300s on recent labs. Monday I already overbooked, then I'm gone on vacation next 2 weeks. I think he needs to be sooner than my next VINCE, which is end of April.       ----- Message -----     From: Char aBss RN     Sent: 3/14/2018   1:55 PM       To: Alisa West MD  Subject: FW: NEW PATIENT HELP                             Is this someone would consider seeing  In a VINCE spot NEW ? You and Anneliese first available regular new is in the summer.   ----- Message -----     From: Lu Candelario     Sent: 3/14/2018  11:30 AM       To: [facility]  Subject: NEW PATIENT HELP                                 Patient has a referral to be seen for DX Long-term use of immunosuppressant medication & History of liver transplant, patient was scheduled with Dr Lester but doesn't want to see her he wants to see a endo doc that possibly knows more about the liver and kidneys and the transplant process as he last endo doc that retired had to do a lot research to find a treatment plan that works.  Patient wanted to see either Pro or Brett a a recommendation by his transplant coordinator.    I don't know if any of them do this please call @ [phone] or message pt on his MyChart regarding this.     Thank you!    Lu Candelario  Intake representative   Call Center    Please DO NOT send this message and/or reply back to sender.  Call Center Representatives DO NOT respond to messages.

## 2018-03-14 NOTE — TELEPHONE ENCOUNTER
Pt called back, says he isn't able to see either of the endocrinologists Abril suggested. He was scheduled to see someone in April, but he canceled it. States he has some hearing loss from his previous work and sometimes struggles to hear people who have accents. He is concerned this may be the case. Suggested he call them back and let them know his situation and ask to be scheduled with someone else. Also suggested seeing if he can see someone at [facility] since they would have access to all his records as well. Pt states he will call [facility] first. Also reports a 10 pound weight gain in 2 weeks, noticed it has been since he started metoprolol. He thinks it could be fluid retention and wants to talk with nephrology about this. Will send them a message.

## 2018-03-15 ENCOUNTER — TELEPHONE (OUTPATIENT)
Dept: TRANSPLANT | Facility: CLINIC | Age: 53
End: 2018-03-15

## 2018-03-15 NOTE — TELEPHONE ENCOUNTER
Patient called back. And would like to do research on endocrine doctors and he may decide to go closer to home. Patient will call back coordinator shortly.

## 2018-03-19 ENCOUNTER — HOSPITAL ENCOUNTER (OUTPATIENT)
Dept: LAB | Facility: CLINIC | Age: 53
Discharge: HOME | End: 2018-03-19
Payer: COMMERCIAL

## 2018-03-19 DIAGNOSIS — Z94.4 LIVER REPLACED BY TRANSPLANT (H): ICD-10-CM

## 2018-03-19 DIAGNOSIS — R79.89 ELEVATED SERUM CREATININE: ICD-10-CM

## 2018-03-19 DIAGNOSIS — R79.89 ELEVATED SERUM CREATININE: Primary | ICD-10-CM

## 2018-03-19 LAB
ALBUMIN SERPL-MCNC: 3.3 G/DL (ref 3.4–5)
ALP SERPL-CCNC: 212 U/L (ref 40–150)
ALT SERPL W P-5'-P-CCNC: 93 U/L (ref 0–70)
ANION GAP SERPL CALCULATED.3IONS-SCNC: 5 MMOL/L (ref 3–14)
AST SERPL W P-5'-P-CCNC: 80 U/L (ref 0–45)
BILIRUB DIRECT SERPL-MCNC: 0.2 MG/DL (ref 0–0.2)
BILIRUB SERPL-MCNC: 0.7 MG/DL (ref 0.2–1.3)
BUN SERPL-MCNC: 30 MG/DL (ref 7–30)
CALCIUM SERPL-MCNC: 8.2 MG/DL (ref 8.5–10.1)
CHLORIDE SERPL-SCNC: 106 MMOL/L (ref 94–109)
CO2 SERPL-SCNC: 29 MMOL/L (ref 20–32)
CREAT SERPL-MCNC: 1.15 MG/DL (ref 0.66–1.25)
ERYTHROCYTE [DISTWIDTH] IN BLOOD BY AUTOMATED COUNT: 17 % (ref 10–15)
GFR SERPL CREATININE-BSD FRML MDRD: 66 ML/MIN/1.7M2
GLUCOSE SERPL-MCNC: 246 MG/DL (ref 70–99)
HCT VFR BLD AUTO: 27.9 % (ref 40–53)
HGB BLD-MCNC: 9.1 G/DL (ref 13.3–17.7)
MAGNESIUM SERPL-MCNC: 1.9 MG/DL (ref 1.6–2.3)
MCH RBC QN AUTO: 30 PG (ref 26.5–33)
MCHC RBC AUTO-ENTMCNC: 32.6 G/DL (ref 31.5–36.5)
MCV RBC AUTO: 92 FL (ref 78–100)
PHOSPHATE SERPL-MCNC: 3.2 MG/DL (ref 2.5–4.5)
PLATELET # BLD AUTO: 59 10E9/L (ref 150–450)
POTASSIUM SERPL-SCNC: 4.7 MMOL/L (ref 3.4–5.3)
PROT SERPL-MCNC: 7 G/DL (ref 6.8–8.8)
RBC # BLD AUTO: 3.03 10E12/L (ref 4.4–5.9)
SODIUM SERPL-SCNC: 140 MMOL/L (ref 133–144)
TACROLIMUS BLD-MCNC: <3 UG/L (ref 5–15)
TME LAST DOSE: ABNORMAL H
WBC # BLD AUTO: 3.8 10E9/L (ref 4–11)

## 2018-03-19 PROCEDURE — 83735 ASSAY OF MAGNESIUM: CPT

## 2018-03-19 PROCEDURE — 84460 ALANINE AMINO (ALT) (SGPT): CPT

## 2018-03-19 PROCEDURE — 80069 RENAL FUNCTION PANEL: CPT

## 2018-03-19 PROCEDURE — 82247 BILIRUBIN TOTAL: CPT

## 2018-03-19 PROCEDURE — 85027 COMPLETE CBC AUTOMATED: CPT

## 2018-03-19 PROCEDURE — 80076 HEPATIC FUNCTION PANEL: CPT

## 2018-03-19 PROCEDURE — 84075 ASSAY ALKALINE PHOSPHATASE: CPT

## 2018-03-19 PROCEDURE — 84155 ASSAY OF PROTEIN SERUM: CPT

## 2018-03-19 PROCEDURE — 84450 TRANSFERASE (AST) (SGOT): CPT

## 2018-03-19 PROCEDURE — 80197 ASSAY OF TACROLIMUS: CPT | Performed by: INTERNAL MEDICINE

## 2018-03-19 PROCEDURE — 82248 BILIRUBIN DIRECT: CPT

## 2018-03-19 PROCEDURE — 36415 COLL VENOUS BLD VENIPUNCTURE: CPT

## 2018-03-21 ENCOUNTER — TELEPHONE (OUTPATIENT)
Dept: TRANSPLANT | Facility: CLINIC | Age: 53
End: 2018-03-21

## 2018-03-21 ENCOUNTER — OFFICE VISIT (OUTPATIENT)
Dept: NEPHROLOGY | Facility: CLINIC | Age: 53
End: 2018-03-21
Payer: COMMERCIAL

## 2018-03-21 VITALS
WEIGHT: 207.2 LBS | SYSTOLIC BLOOD PRESSURE: 125 MMHG | OXYGEN SATURATION: 100 % | HEIGHT: 72 IN | TEMPERATURE: 98.7 F | DIASTOLIC BLOOD PRESSURE: 76 MMHG | BODY MASS INDEX: 28.06 KG/M2

## 2018-03-21 DIAGNOSIS — E87.5 HYPERKALEMIA: ICD-10-CM

## 2018-03-21 DIAGNOSIS — N18.30 CKD (CHRONIC KIDNEY DISEASE) STAGE 3, GFR 30-59 ML/MIN (H): Primary | ICD-10-CM

## 2018-03-21 DIAGNOSIS — I10 BENIGN ESSENTIAL HYPERTENSION: ICD-10-CM

## 2018-03-21 PROCEDURE — G0463 HOSPITAL OUTPT CLINIC VISIT: HCPCS | Mod: ZF

## 2018-03-21 RX ORDER — METOPROLOL SUCCINATE 25 MG/1
50 TABLET, EXTENDED RELEASE ORAL DAILY
Qty: 180 TABLET | Refills: 3 | Status: SHIPPED | OUTPATIENT
Start: 2018-03-21 | End: 2018-04-04

## 2018-03-21 RX ORDER — SODIUM BICARBONATE 650 MG/1
650 TABLET ORAL DAILY
Qty: 1 TABLET | Refills: 0
Start: 2018-03-21 | End: 2018-04-04

## 2018-03-21 ASSESSMENT — PAIN SCALES - GENERAL: PAINLEVEL: NO PAIN (0)

## 2018-03-21 NOTE — NURSING NOTE
Chief Complaint   Patient presents with     Hospital F/U     Elevated serum creatinine   Pt roomed, vitals, meds, and allergies reviewed with pt. Pt ready for provider.  Aric Villanueva, CMA

## 2018-03-21 NOTE — LETTER
3/21/2018       RE: Camacho Bhagat  [address]  [facility] [address]     Dear Colleague,    Thank you for referring your patient, Camacho Bhagat, to the [facility] NEPHROLOGY at [facility]. Please see a copy of my visit note below.    Nephrology Clinic Visit 3/21/18    Assessment and Plan:       1. CKD3b w/proteinuria - Creat 1.1. Baseline in the 1.3 range.    Etiology of CKD likely multifactorial; DM, recurrent EJ, CNI. Has been intolerant of ACE/ARB previously given problems with hyperkalemia. Blood pressure improving, and approaching goal. Tac dose has been decreased to undetectable level, but now transaminases rising.    - Patient developed significant unexplained proteinuria at the end of January 2018 following his ileostomy takedown. Baseline Urine protein/creat had been in the 0.8 g/gCr range and then jumped to 9.57 g/gCr on 1/31/18. Blood pressures were in the 160-170/ range at that time. SPEP/immunofixation neg for monoclonal protein, Kappa/Lambda light chain ratio normal. On 2/28/18 Urine protein/creat ratio declined to 3.59 g/gCr. UA neg for hematuria. Urine protein has declined to 100 from > 499 in January 2018. SBP in the 120-140/ range. A1c 5.1% in January.        - Will continue to work on blood pressure management. Goal is < 1 40/80       -  May be able to add low dosed ACE/ARB next visit if K remains normal.      - Avoid NSAIDs, nephrotoxins   - A1c goal is 7%. HgA1c 5.1% Jan 2018   - B/P goal is < 140/80. Currently at goal.       2. HTN - Essentially at goal but with mild edema. Home blood pressures have improved to 130-140/ range.  Clinic blood pressures 120-140/ today. Does have mild edema left LE. No dyspnea. Weight up to 94 kg from 88.2 kg last visit. Currently on Lasix 40 mg bid, Amlodipine 10 mg qd and Toprol XL 50 mg qd.    - Will increase Lasix to 60 mg bid.    - Continue daily blood pressure readings   - Have labs checked tomorrow and prior to  visit with Dr Rubio.    - If K remains normal, could begin tapering down on Amlodipine and add low dose ACE      3. Hyperkalemia assoc with Tac -  Appears to be resolved given reduction in Tacrolimus dose. K 4.7 today. Previously on Valtessa and Florinef prior to reduction in Tac dose    - Will have chemistries drawn tomorrow. .    - Increasing Lasix may be beneficial      4. Volume status - Has mild edema but no dyspnea. He has gained 13# from last visit. Only having 2-4 stools/day. Voiding w/o difficulty. Weight 207#, up from 194.4 # last visit. Blood pressures improved. He may be gaining some body weight as well.    - Increase Lasix to 60 mg bid.    - Check labs tomorrow and next week      5. Acid base - Bicarb 29 in setting of decreased stools following takedown. Currently on Bicarb 650 mg BID   - Decrease Bicarb to 650 mg Qd.    - May be able to d/c next visit      6. BMD - Ca 8.2, Phos 3.2, albumin 3.3   - Vitamin D 20 and PTH 34 (9/14/17)   - Continue Vit D 1000 U qd      7. Hypomagnesemia -Resolved. Mag 1.9 w/o replacement      8. Anemia - Stable. Hgb 9.1. Had risen to 9.4 prior to surgery on 1/5/18. Etiology felt to be 2/2 CKD and decreased absorption 2/2 bowel resection   - Iron studies 1/18: Ferritin 973, Fe 37, Tsat 23%.    - Given Aranesp 25 mcg x 1 on 10/27/17   - Not clear patient really needs DIPAK at this time. His Hgb was rising prior to surgery. Will hold off on referral at this time.       9. Liver transplant IS: Tacrolimus, MMF, Pred   - Tacro dose decreased to undetectable level.    - Unfortunately transaminases rising      10. Dispo- RCT on 4/4 with Dr Rubio. Labs this week and prior to Dr Rubio's visit      Assessment and plan was discussed with patient and he voiced his understanding and agreement.      Reason for Visit:  CKD/HTN follow up        HPI:  Mr Bhagat is a 53 yo male in today for CKD3 f/u. He has struggled with hyperkalemia attributed to Tacrolimus ( currently on very low  dose with undetected level) and recurrent EJ 2/2 large volume ostomy output. Had ostomy takedown on 1/5/18. Demonstrated unusual proteinuria post op that is slowly improving.         Interval Hx:   No interval hospital admissions.  Transaminases rising.   Blood pressure improved with increase in Toprol XL to 50 mg qd    Baseline Cr: 1.3 range      ROS:  Feeling well, but worried about liver given rise in transaminases. Major concern today was ongoing weight gain despite diuretics. He notes minimal edema, no dyspnea, improving blood pressures. Having 2-4 stools/day. Denies CP, voiding concerns. Active around the house. Appetite is good.         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 Former smoker, LPN by profession.     Allergies:  Allergies   Allergen Reactions     Erythromycin GI Disturbance     Vioxx      Nausea, vomiting       Medications:  Prior to Admission medications    Medication Sig Start Date End Date Taking? Authorizing Provider   metoprolol succinate (TOPROL-XL) 25 MG 24 hr tablet Take 2 tablets (50 mg) by mouth daily 3/21/18  Yes Cinda Cazares NP   sodium bicarbonate 650 MG tablet Take 1 tablet (650 mg) by mouth daily 3/21/18  Yes Cinda Cazares NP   oxyCODONE IR (ROXICODONE) 10 MG tablet Take 0.5 tablets (5 mg) by mouth every 6 hours as needed for moderate to severe pain 2/15/18  Yes Paulo Hunter MD   tacrolimus (GENERIC EQUIVALENT) 1 MG capsule Take 1 capsule (1 mg) by mouth every 12 hours 2/15/18  Yes Felicia Tolliver APRN CNP   predniSONE (DELTASONE) 2.5 MG tablet Take 1 tablet (2.5 mg) by mouth daily 2/5/18  Yes Jovan Tran MD    furosemide (LASIX) 20 MG tablet Take 2 tablets (60 mg) by mouth 2 times daily 1/31/18  Yes Cinda Cazares NP   cholecalciferol (VITAMIN D3) 1000 UNIT tablet Take 1 tablet (1,000 Units) by mouth daily 12/6/17  Yes Cinda Cazares NP   mycophenolic acid (GENERIC EQUIVALENT) 360 MG EC tablet Take 2 tablets (720 mg) by mouth every 12 hours 11/20/17  Yes Jovan Tran MD   amLODIPine (NORVASC) 10 MG tablet Take 1 tablet (10 mg) by mouth daily  Patient taking differently: Take 10 mg by mouth every morning  11/7/17  Yes Ninfa Hernández MD   CIALIS 5 MG tablet Take 5 mg by mouth as needed  6/7/17  Yes Reported, Patient   OMEPRAZOLE PO Take 20 mg by mouth every morning    Yes Reported, Patient   GABAPENTIN PO Take 300 mg by mouth 3 times daily   Yes Reported, Patient   insulin glargine (LANTUS) 100 UNIT/ML injection Inject 50 Units Subcutaneous every morning   Yes Reported, Patient   linagliptin (TRADJENTA) 5 MG TABS tablet Take 5 mg by mouth daily    Yes Reported, Patient   cyclobenzaprine (FLEXERIL) 10 MG tablet Take 1 tablet (10 mg) by mouth 3 times daily as needed for muscle spasms 9/8/17  Yes Felicia Tolliver, APRN CNP       Vitals:  /76  Temp 98.7  F (37.1  C) (Oral)  Ht 1.829 m (6')  Wt 94 kg (207 lb 3.2 oz)  SpO2 100%  BMI 28.1 kg/m2    Exam:  Gen: Calm, pleasant and interactive  CARDIAC: RRR  LUNGS: CTA  ABDOMEN: Abdomin NT. Non distended.  EXT: No edema right LE, 1+ LE edema left LE    Results:  Results for RONNIE ARIZMENDI (MRN [id number]) as of 3/21/2018 14:10   Ref. Range 3/19/2018 11:14   Sodium Latest Ref Range: 133 - 144 mmol/L 140   Potassium Latest Ref Range: 3.4 - 5.3 mmol/L 4.7   Chloride Latest Ref Range: 94 - 109 mmol/L 106   Carbon Dioxide Latest Ref Range: 20 - 32 mmol/L 29   Urea Nitrogen Latest Ref Range: 7 - 30 mg/dL 30   Creatinine Latest Ref Range: 0.66 - 1.25 mg/dL 1.15   GFR Estimate Latest Ref Range: >60 mL/min/1.7m2 66   GFR Estimate If Black Latest Ref  Range: >60 mL/min/1.7m2 80   Calcium Latest Ref Range: 8.5 - 10.1 mg/dL 8.2 (L)   Anion Gap Latest Ref Range: 3 - 14 mmol/L 5   Magnesium Latest Ref Range: 1.6 - 2.3 mg/dL 1.9   Phosphorus Latest Ref Range: 2.5 - 4.5 mg/dL 3.2   Albumin Latest Ref Range: 3.4 - 5.0 g/dL 3.3 (L)   Protein Total Latest Ref Range: 6.8 - 8.8 g/dL 7.0   Bilirubin Total Latest Ref Range: 0.2 - 1.3 mg/dL 0.7   Alkaline Phosphatase Latest Ref Range: 40 - 150 U/L 212 (H)   ALT Latest Ref Range: 0 - 70 U/L 93 (H)   AST Latest Ref Range: 0 - 45 U/L 80 (H)   Bilirubin Direct Latest Ref Range: 0.0 - 0.2 mg/dL 0.2   Glucose Latest Ref Range: 70 - 99 mg/dL 246 (H)   WBC Latest Ref Range: 4.0 - 11.0 10e9/L 3.8 (L)   Hemoglobin Latest Ref Range: 13.3 - 17.7 g/dL 9.1 (L)   Hematocrit Latest Ref Range: 40.0 - 53.0 % 27.9 (L)   Platelet Count Latest Ref Range: 150 - 450 10e9/L 59 (L)   RBC Count Latest Ref Range: 4.4 - 5.9 10e12/L 3.03 (L)   MCV Latest Ref Range: 78 - 100 fl 92   MCH Latest Ref Range: 26.5 - 33.0 pg 30.0   MCHC Latest Ref Range: 31.5 - 36.5 g/dL 32.6   RDW Latest Ref Range: 10.0 - 15.0 % 17.0 (H)   Tacrolimus Last Dose Unknown 2130 03/18/2018   Tacrolimus Level Latest Ref Range: 5.0 - 15.0 ug/L <3.0 (L)     Sincerely,    Cinda Cazares NP

## 2018-03-21 NOTE — LETTER
3/21/2018      RE: Camacho Bhagat  [address]  [facility] [address]       Nephrology Clinic Visit 3/21/18    Assessment and Plan:       1. CKD3b w/proteinuria - Creat 1.1. Baseline in the 1.3 range.    Etiology of CKD likely multifactorial; DM, recurrent EJ, CNI. Has been intolerant of ACE/ARB previously given problems with hyperkalemia. Blood pressure improving, and approaching goal. Tac dose has been decreased to undetectable level, but now transaminases rising.    - Patient developed significant unexplained proteinuria at the end of January 2018 following his ileostomy takedown. Baseline Urine protein/creat had been in the 0.8 g/gCr range and then jumped to 9.57 g/gCr on 1/31/18. Blood pressures were in the 160-170/ range at that time. SPEP/immunofixation neg for monoclonal protein, Kappa/Lambda light chain ratio normal. On 2/28/18 Urine protein/creat ratio declined to 3.59 g/gCr. UA neg for hematuria. Urine protein has declined to 100 from > 499 in January 2018. SBP in the 120-140/ range. A1c 5.1% in January.        - Will continue to work on blood pressure management. Goal is < 1 40/80       -  May be able to add low dosed ACE/ARB next visit if K remains normal.      - Avoid NSAIDs, nephrotoxins   - A1c goal is 7%. HgA1c 5.1% Jan 2018   - B/P goal is < 140/80. Currently at goal.       2. HTN - Essentially at goal but with mild edema. Home blood pressures have improved to 130-140/ range.  Clinic blood pressures 120-140/ today. Does have mild edema left LE. No dyspnea. Weight up to 94 kg from 88.2 kg last visit. Currently on Lasix 40 mg bid, Amlodipine 10 mg qd and Toprol XL 50 mg qd.    - Will increase Lasix to 60 mg bid.    - Continue daily blood pressure readings   - Have labs checked tomorrow and prior to visit with Dr Rubio.    - If K remains normal, could begin tapering down on Amlodipine and add low dose ACE      3. Hyperkalemia assoc with Tac -  Appears to be resolved given reduction in  Tacrolimus dose. K 4.7 today. Previously on Valtessa and Florinef prior to reduction in Tac dose    - Will have chemistries drawn tomorrow. .    - Increasing Lasix may be beneficial      4. Volume status - Has mild edema but no dyspnea. He has gained 13# from last visit. Only having 2-4 stools/day. Voiding w/o difficulty. Weight 207#, up from 194.4 # last visit. Blood pressures improved. He may be gaining some body weight as well.    - Increase Lasix to 60 mg bid.    - Check labs tomorrow and next week      5. Acid base - Bicarb 29 in setting of decreased stools following takedown. Currently on Bicarb 650 mg BID   - Decrease Bicarb to 650 mg Qd.    - May be able to d/c next visit      6. BMD - Ca 8.2, Phos 3.2, albumin 3.3   - Vitamin D 20 and PTH 34 (9/14/17)   - Continue Vit D 1000 U qd      7. Hypomagnesemia -Resolved. Mag 1.9 w/o replacement      8. Anemia - Stable. Hgb 9.1. Had risen to 9.4 prior to surgery on 1/5/18. Etiology felt to be 2/2 CKD and decreased absorption 2/2 bowel resection   - Iron studies 1/18: Ferritin 973, Fe 37, Tsat 23%.    - Given Aranesp 25 mcg x 1 on 10/27/17   - Not clear patient really needs DIPAK at this time. His Hgb was rising prior to surgery. Will hold off on referral at this time.       9. Liver transplant IS: Tacrolimus, MMF, Pred   - Tacro dose decreased to undetectable level.    - Unfortunately transaminases rising      10. Dispo- RCT on 4/4 with Dr Rubio. Labs this week and prior to Dr Rubio's visit      Assessment and plan was discussed with patient and he voiced his understanding and agreement.      Reason for Visit:  CKD/HTN follow up        HPI:  Mr Bhagat is a 53 yo male in today for CKD3 f/u. He has struggled with hyperkalemia attributed to Tacrolimus ( currently on very low dose with undetected level) and recurrent EJ 2/2 large volume ostomy output. Had ostomy takedown on 1/5/18. Demonstrated unusual proteinuria post op that is slowly improving.         Interval  Hx:   No interval hospital admissions.  Transaminases rising.   Blood pressure improved with increase in Toprol XL to 50 mg qd    Baseline Cr: 1.3 range      ROS:  Feeling well, but worried about liver given rise in transaminases. Major concern today was ongoing weight gain despite diuretics. He notes minimal edema, no dyspnea, improving blood pressures. Having 2-4 stools/day. Denies CP, voiding concerns. Active around the house. Appetite is good.         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 Former smoker, LPN by profession.     Allergies:  Allergies   Allergen Reactions     Erythromycin GI Disturbance     Vioxx      Nausea, vomiting       Medications:  Prior to Admission medications    Medication Sig Start Date End Date Taking? Authorizing Provider   metoprolol succinate (TOPROL-XL) 25 MG 24 hr tablet Take 2 tablets (50 mg) by mouth daily 3/21/18  Yes Cinda Cazares NP   sodium bicarbonate 650 MG tablet Take 1 tablet (650 mg) by mouth daily 3/21/18  Yes Cinda Cazares NP   oxyCODONE IR (ROXICODONE) 10 MG tablet Take 0.5 tablets (5 mg) by mouth every 6 hours as needed for moderate to severe pain 2/15/18  Yes Paulo Hunter MD   tacrolimus (GENERIC EQUIVALENT) 1 MG capsule Take 1 capsule (1 mg) by mouth every 12 hours 2/15/18  Yes Felicia Tolliver APRN CNP   predniSONE (DELTASONE) 2.5 MG tablet Take 1 tablet (2.5 mg) by mouth daily 2/5/18  Yes Jovan Tran MD   furosemide (LASIX) 20 MG tablet Take 2 tablets (60 mg) by mouth 2 times daily 1/31/18  Yes Cinda Cazares NP   cholecalciferol (VITAMIN D3) 1000 UNIT tablet Take 1 tablet (1,000 Units) by mouth  daily 12/6/17  Yes Cinda Cazares, NP   mycophenolic acid (GENERIC EQUIVALENT) 360 MG EC tablet Take 2 tablets (720 mg) by mouth every 12 hours 11/20/17  Yes Jovan Tran MD   amLODIPine (NORVASC) 10 MG tablet Take 1 tablet (10 mg) by mouth daily  Patient taking differently: Take 10 mg by mouth every morning  11/7/17  Yes Ninfa Hernández MD   CIALIS 5 MG tablet Take 5 mg by mouth as needed  6/7/17  Yes Reported, Patient   OMEPRAZOLE PO Take 20 mg by mouth every morning    Yes Reported, Patient   GABAPENTIN PO Take 300 mg by mouth 3 times daily   Yes Reported, Patient   insulin glargine (LANTUS) 100 UNIT/ML injection Inject 50 Units Subcutaneous every morning   Yes Reported, Patient   linagliptin (TRADJENTA) 5 MG TABS tablet Take 5 mg by mouth daily    Yes Reported, Patient   cyclobenzaprine (FLEXERIL) 10 MG tablet Take 1 tablet (10 mg) by mouth 3 times daily as needed for muscle spasms 9/8/17  Yes Felicia Tolliver, APRN CNP       Vitals:  /76  Temp 98.7  F (37.1  C) (Oral)  Ht 1.829 m (6')  Wt 94 kg (207 lb 3.2 oz)  SpO2 100%  BMI 28.1 kg/m2    Exam:  Gen: Calm, pleasant and interactive  CARDIAC: RRR  LUNGS: CTA  ABDOMEN: Abdomin NT. Non distended.  EXT: No edema right LE, 1+ LE edema left LE    Results:  Results for RONNIE ARIZMENDI TERESA (MRN [id number]) as of 3/21/2018 14:10   Ref. Range 3/19/2018 11:14   Sodium Latest Ref Range: 133 - 144 mmol/L 140   Potassium Latest Ref Range: 3.4 - 5.3 mmol/L 4.7   Chloride Latest Ref Range: 94 - 109 mmol/L 106   Carbon Dioxide Latest Ref Range: 20 - 32 mmol/L 29   Urea Nitrogen Latest Ref Range: 7 - 30 mg/dL 30   Creatinine Latest Ref Range: 0.66 - 1.25 mg/dL 1.15   GFR Estimate Latest Ref Range: >60 mL/min/1.7m2 66   GFR Estimate If Black Latest Ref Range: >60 mL/min/1.7m2 80   Calcium Latest Ref Range: 8.5 - 10.1 mg/dL 8.2 (L)   Anion Gap Latest Ref Range: 3 - 14 mmol/L 5   Magnesium Latest Ref Range: 1.6 - 2.3 mg/dL 1.9   Phosphorus Latest Ref  Range: 2.5 - 4.5 mg/dL 3.2   Albumin Latest Ref Range: 3.4 - 5.0 g/dL 3.3 (L)   Protein Total Latest Ref Range: 6.8 - 8.8 g/dL 7.0   Bilirubin Total Latest Ref Range: 0.2 - 1.3 mg/dL 0.7   Alkaline Phosphatase Latest Ref Range: 40 - 150 U/L 212 (H)   ALT Latest Ref Range: 0 - 70 U/L 93 (H)   AST Latest Ref Range: 0 - 45 U/L 80 (H)   Bilirubin Direct Latest Ref Range: 0.0 - 0.2 mg/dL 0.2   Glucose Latest Ref Range: 70 - 99 mg/dL 246 (H)   WBC Latest Ref Range: 4.0 - 11.0 10e9/L 3.8 (L)   Hemoglobin Latest Ref Range: 13.3 - 17.7 g/dL 9.1 (L)   Hematocrit Latest Ref Range: 40.0 - 53.0 % 27.9 (L)   Platelet Count Latest Ref Range: 150 - 450 10e9/L 59 (L)   RBC Count Latest Ref Range: 4.4 - 5.9 10e12/L 3.03 (L)   MCV Latest Ref Range: 78 - 100 fl 92   MCH Latest Ref Range: 26.5 - 33.0 pg 30.0   MCHC Latest Ref Range: 31.5 - 36.5 g/dL 32.6   RDW Latest Ref Range: 10.0 - 15.0 % 17.0 (H)   Tacrolimus Last Dose Unknown 2130 03/18/2018   Tacrolimus Level Latest Ref Range: 5.0 - 15.0 ug/L <3.0 (L)       Cinda Cazares, NP

## 2018-03-21 NOTE — MR AVS SNAPSHOT
After Visit Summary   3/21/2018    Camacho Bhagat    MRN: [id number]           Patient Information     Date Of Birth          1965        Visit Information        Provider Department      3/21/2018 1:30 PM Cinda Cazares NP [facility] Nephrology        Today's Diagnoses     CKD (chronic kidney disease) stage 3, GFR 30-59 ml/min    -  1    Benign essential hypertension        Hyperkalemia           Follow-ups after your visit        Your next 10 appointments already scheduled     Mar 28, 2018  2:00 PM CDT   (Arrive by 1:45 PM)   Return Visit with Sara Dinh MD   [facility] Colon and Rectal Surgery ([facility])    [address]  [address]  [facility] [address]   [phone]            Apr 04, 2018 10:45 AM CDT   (Arrive by 10:15 AM)   Return Visit with Jael Rubio MD   [facility] Nephrology ([facility])    [address]  [address]  [facility] [address]   [phone]            Apr 18, 2018  5:30 PM CDT   (Arrive by 5:15 PM)   NEW DIABETES with Kiana Ching MD   [facility] Endocrinology ([facility])    [address]  [address]  [facility] [address]   [phone]            Jun 06, 2018 12:45 PM CDT   (Arrive by 12:30 PM)   Return Liver Transplant with Toñito Camejo MD   [facility] Hepatology ([facility])    [address]  [address]  [facility] [address]   [phone]              Future tests that were ordered for you today     Open Standing Orders        Priority Remaining Interval Expires Ordered    Renal panel Routine 99/99  3/21/2019 3/21/2018          Open Future Orders        Priority Expected Expires Ordered    Protein  random urine with Creat Ratio Routine  3/21/2019 3/21/2018            Who to contact     If you have questions or need follow up information about today's clinic visit or your schedule please  contact [facility] NEPHROLOGY directly at [phone].  Normal or non-critical lab and imaging results will be communicated to you by [employer], letter or phone within 4 business days after the clinic has received the results. If you do not hear from us within 7 days, please contact the clinic through [employer] or phone. If you have a critical or abnormal lab result, we will notify you by phone as soon as possible.  Submit refill requests through [employer] or call your pharmacy and they will forward the refill request to us. Please allow 3 business days for your refill to be completed.          Additional Information About Your Visit        [employer] Information     [employer] gives you secure access to your electronic health record. If you see a primary care provider, you can also send messages to your care team and make appointments. If you have questions, please call your primary care clinic.  If you do not have a primary care provider, please call [phone] and they will assist you.        Care EveryWhere ID     This is your Care EveryWhere ID. This could be used by other organizations to access your [place] medical records  [id number]        Your Vitals Were     Temperature Height Pulse Oximetry BMI (Body Mass Index)          98.7  F (37.1  C) (Oral) 1.829 m (6') 100% 28.1 kg/m2         Blood Pressure from Last 3 Encounters:   03/21/18 125/76   03/06/18 131/83   03/02/18 157/88    Weight from Last 3 Encounters:   03/21/18 94 kg (207 lb 3.2 oz)   03/06/18 86.6 kg (191 lb)   03/02/18 89.6 kg (197 lb 9.6 oz)                 Today's Medication Changes          These changes are accurate as of 3/21/18  2:47 PM.  If you have any questions, ask your nurse or doctor.               These medicines have changed or have updated prescriptions.        Dose/Directions    amLODIPine 10 MG tablet   Commonly known as:  NORVASC   This may have changed:  when to take this   Used for:  HTN (hypertension)        Dose:  10 mg   Take 1  tablet (10 mg) by mouth daily   Quantity:  90 tablet   Refills:  3       metoprolol succinate 25 MG 24 hr tablet   Commonly known as:  TOPROL-XL   This may have changed:  how much to take   Used for:  Benign essential hypertension   Changed by:  Cinda Cazares NP        Dose:  50 mg   Take 2 tablets (50 mg) by mouth daily   Quantity:  180 tablet   Refills:  3       sodium bicarbonate 650 MG tablet   This may have changed:  when to take this   Used for:  Hyperkalemia   Changed by:  Cinda Cazares NP        Dose:  650 mg   Take 1 tablet (650 mg) by mouth daily   Quantity:  1 tablet   Refills:  0            Where to get your medicines      These medications were sent to [facility] [address] [place] [address] [address]  [address] [facility] [address]     Phone:  [phone]     metoprolol succinate 25 MG 24 hr tablet         Some of these will need a paper prescription and others can be bought over the counter.  Ask your nurse if you have questions.     You don't need a prescription for these medications     sodium bicarbonate 650 MG tablet                Primary Care Provider Office Phone # Fax #    Shay Kirkpatrick [phone]       [address] [place] [address] [address] [place] [address]        Equal Access to Services     FRANKLIN PORTILLO AH: Hadii tavo swartz hadasho Soomaali, waaxda luqadaha, qaybta kaalmada adeegyada, devi duckworth. So [facility] [phone].    ATENCIÓN: Si habla español, tiene a zheng disposición servicios gratuitos de asistencia lingüística. ame al [phone].    We comply with applicable federal civil rights laws and [place] laws. We do not discriminate on the basis of race, color, national origin, age, disability, sex, sexual orientation, or gender identity.            Thank you!     Thank you for choosing [facility] NEPHROLOGY  for your care. Our goal is always to provide you with excellent care. Hearing back  from our patients is one way we can continue to improve our services. Please take a few minutes to complete the written survey that you may receive in the mail after your visit with us. Thank you!             Your Updated Medication List - Protect others around you: Learn how to safely use, store and throw away your medicines at www.disposemymeds.org.          This list is accurate as of 3/21/18  2:47 PM.  Always use your most recent med list.                   Brand Name Dispense Instructions for use Diagnosis    amLODIPine 10 MG tablet    NORVASC    90 tablet    Take 1 tablet (10 mg) by mouth daily    HTN (hypertension)       cholecalciferol 1000 UNIT tablet    vitamin D3    100 tablet    Take 1 tablet (1,000 Units) by mouth daily    Vitamin D deficiency       CIALIS 5 MG tablet   Generic drug:  tadalafil      Take 5 mg by mouth as needed        cyclobenzaprine 10 MG tablet    FLEXERIL    90 tablet    Take 1 tablet (10 mg) by mouth 3 times daily as needed for muscle spasms    Immunosuppression (H)       furosemide 20 MG tablet    LASIX    200 tablet    Take 2 tablets (40 mg) by mouth 2 times daily    Hyperkalemia       GABAPENTIN PO      Take 300 mg by mouth 3 times daily        insulin glargine 100 UNIT/ML injection    LANTUS     Inject 50 Units Subcutaneous every morning        linagliptin 5 MG Tabs tablet    TRADJENTA     Take 5 mg by mouth daily        metoprolol succinate 25 MG 24 hr tablet    TOPROL-XL    180 tablet    Take 2 tablets (50 mg) by mouth daily    Benign essential hypertension       mycophenolic acid 360 MG EC tablet    GENERIC EQUIVALENT    120 tablet    Take 2 tablets (720 mg) by mouth every 12 hours    History of liver transplant (H), Long-term use of immunosuppressant medication       OMEPRAZOLE PO      Take 20 mg by mouth every morning        oxyCODONE IR 10 MG tablet    ROXICODONE    15 tablet    Take 0.5 tablets (5 mg) by mouth every 6 hours as needed for moderate to severe pain     Intra-abdominal infection, Abdominal abscess (H)       predniSONE 2.5 MG tablet    DELTASONE    90 tablet    Take 1 tablet (2.5 mg) by mouth daily    Liver replaced by transplant (H), Long-term use of immunosuppressant medication       sodium bicarbonate 650 MG tablet     1 tablet    Take 1 tablet (650 mg) by mouth daily    Hyperkalemia       tacrolimus 1 MG capsule    GENERIC EQUIVALENT    60 capsule    Take 1 capsule (1 mg) by mouth every 12 hours    Liver transplant recipient (H)

## 2018-03-21 NOTE — PROGRESS NOTES
Nephrology Clinic Visit 3/21/18    Assessment and Plan:       1. CKD3b w/proteinuria - Creat 1.1. Baseline in the 1.3 range.    Etiology of CKD likely multifactorial; DM, recurrent EJ, CNI. Has been intolerant of ACE/ARB previously given problems with hyperkalemia. Blood pressure improving, and approaching goal. Tac dose has been decreased to undetectable level, but now transaminases rising.    - Patient developed significant unexplained proteinuria at the end of January 2018 following his ileostomy takedown. Baseline Urine protein/creat had been in the 0.8 g/gCr range and then jumped to 9.57 g/gCr on 1/31/18. Blood pressures were in the 160-170/ range at that time. SPEP/immunofixation neg for monoclonal protein, Kappa/Lambda light chain ratio normal. On 2/28/18 Urine protein/creat ratio declined to 3.59 g/gCr. UA neg for hematuria. Urine protein has declined to 100 from > 499 in January 2018. SBP in the 120-140/ range. A1c 5.1% in January.        - Will continue to work on blood pressure management. Goal is < 140/80       - May be able to add low dosed ACE/ARB next visit if K remains normal.      - Avoid NSAIDs, nephrotoxins   - A1c goal is 7%. HgA1c 5.1% Jan 2018   - B/P goal is < 140/80. Currently at goal.       2. HTN - Essentially at goal but with mild edema. Home blood pressures have improved to 130-140/ range.  Clinic blood pressures 120-140/ today. Does have mild edema left LE. No dyspnea. Weight up to 94 kg from 88.2 kg last visit. Currently on Lasix 40 mg bid, Amlodipine 10 mg qd and Toprol XL 50 mg qd.    - Will increase Lasix to 60 mg bid.    - Continue daily blood pressure readings   - Have labs checked tomorrow and prior to visit with Dr Rubio.    - If K remains normal, could begin tapering down on Amlodipine and add low dose ACE      3. Hyperkalemia assoc with Tac - Appears to be resolved given reduction in Tacrolimus dose. K 4.7 today. Previously on Valtessa and Florinef prior to reduction in Tac  dose    - Will have chemistries drawn tomorrow. .    - Increasing Lasix may be beneficial      4. Volume status - Has mild edema but no dyspnea. He has gained 13# from last visit. Only having 2-4 stools/day. Voiding w/o difficulty. Weight 207#, up from 194.4 # last visit. Blood pressures improved. He may be gaining some body weight as well.    - Increase Lasix to 60 mg bid.    - Check labs tomorrow and next week      5. Acid base - Bicarb 29 in setting of decreased stools following takedown. Currently on Bicarb 650 mg BID   - Decrease Bicarb to 650 mg Qd.    - May be able to d/c next visit      6. BMD - Ca 8.2, Phos 3.2, albumin 3.3   - Vitamin D 20 and PTH 34 (9/14/17)   - Continue Vit D 1000 U qd      7. Hypomagnesemia -Resolved. Mag 1.9 w/o replacement      8. Anemia - Stable. Hgb 9.1. Had risen to 9.4 prior to surgery on 1/5/18. Etiology felt to be 2/2 CKD and decreased absorption 2/2 bowel resection   - Iron studies 1/18: Ferritin 973, Fe 37, Tsat 23%.    - Given Aranesp 25 mcg x 1 on 10/27/17   - Not clear patient really needs DIPAK at this time. His Hgb was rising prior to surgery. Will hold off on referral at this time.       9. Liver transplant IS: Tacrolimus, MMF, Pred   - Tacro dose decreased to undetectable level.    - Unfortunately transaminases rising      10. Dispo- RCT on 4/4 with Dr Rubio. Labs this week and prior to Dr Rubio's visit      Assessment and plan was discussed with patient and he voiced his understanding and agreement.      Reason for Visit:  CKD/HTN follow up        HPI:  Mr Bhagat is a 53 yo male in today for CKD3 f/u. He has struggled with hyperkalemia attributed to Tacrolimus ( currently on very low dose with undetected level) and recurrent EJ 2/2 large volume ostomy output. Had ostomy takedown on 1/5/18. Demonstrated unusual proteinuria post op that is slowly improving.         Interval Hx:   No interval hospital admissions.  Transaminases rising.   Blood pressure improved with  increase in Toprol XL to 50 mg qd    Baseline Cr: 1.3 range      ROS:  Feeling well, but worried about liver given rise in transaminases. Major concern today was ongoing weight gain despite diuretics. He notes minimal edema, no dyspnea, improving blood pressures. Having 2-4 stools/day. Denies CP, voiding concerns. Active around the house. Appetite is good.         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 Former smoker, LPN by profession.     Allergies:  Allergies   Allergen Reactions     Erythromycin GI Disturbance     Vioxx      Nausea, vomiting       Medications:  Prior to Admission medications    Medication Sig Start Date End Date Taking? Authorizing Provider   metoprolol succinate (TOPROL-XL) 25 MG 24 hr tablet Take 2 tablets (50 mg) by mouth daily 3/21/18  Yes Cinda Cazares NP   sodium bicarbonate 650 MG tablet Take 1 tablet (650 mg) by mouth daily 3/21/18  Yes Cinda Cazares NP   oxyCODONE IR (ROXICODONE) 10 MG tablet Take 0.5 tablets (5 mg) by mouth every 6 hours as needed for moderate to severe pain 2/15/18  Yes Paulo Hunter MD   tacrolimus (GENERIC EQUIVALENT) 1 MG capsule Take 1 capsule (1 mg) by mouth every 12 hours 2/15/18  Yes Felicia Tolliver APRN CNP   predniSONE (DELTASONE) 2.5 MG tablet Take 1 tablet (2.5 mg) by mouth daily 2/5/18  Yes Jovan Tran MD   furosemide (LASIX) 20 MG tablet Take 2 tablets (60 mg) by mouth 2 times daily 1/31/18  Yes Cinda Cazares NP   cholecalciferol (VITAMIN D3) 1000 UNIT tablet Take 1 tablet (1,000 Units) by mouth daily 12/6/17  Yes Cinda Cazares NP   mycophenolic acid (GENERIC EQUIVALENT) 360 MG  EC tablet Take 2 tablets (720 mg) by mouth every 12 hours 11/20/17  Yes Jovan Tran MD   amLODIPine (NORVASC) 10 MG tablet Take 1 tablet (10 mg) by mouth daily  Patient taking differently: Take 10 mg by mouth every morning  11/7/17  Yes Ninfa Hernández MD   CIALIS 5 MG tablet Take 5 mg by mouth as needed  6/7/17  Yes Reported, Patient   OMEPRAZOLE PO Take 20 mg by mouth every morning    Yes Reported, Patient   GABAPENTIN PO Take 300 mg by mouth 3 times daily   Yes Reported, Patient   insulin glargine (LANTUS) 100 UNIT/ML injection Inject 50 Units Subcutaneous every morning   Yes Reported, Patient   linagliptin (TRADJENTA) 5 MG TABS tablet Take 5 mg by mouth daily    Yes Reported, Patient   cyclobenzaprine (FLEXERIL) 10 MG tablet Take 1 tablet (10 mg) by mouth 3 times daily as needed for muscle spasms 9/8/17  Yes Felicia Tolliver, LAMIN CNP       Vitals:  /76  Temp 98.7  F (37.1  C) (Oral)  Ht 1.829 m (6')  Wt 94 kg (207 lb 3.2 oz)  SpO2 100%  BMI 28.1 kg/m2    Exam:  Gen: Calm, pleasant and interactive  CARDIAC: RRR  LUNGS: CTA  ABDOMEN: Abdomin NT. Non distended.  EXT: No edema right LE, 1+ LE edema left LE    Results:  Results for RONNIE ARIZMENDI (MRN [id number]) as of 3/21/2018 14:10   Ref. Range 3/19/2018 11:14   Sodium Latest Ref Range: 133 - 144 mmol/L 140   Potassium Latest Ref Range: 3.4 - 5.3 mmol/L 4.7   Chloride Latest Ref Range: 94 - 109 mmol/L 106   Carbon Dioxide Latest Ref Range: 20 - 32 mmol/L 29   Urea Nitrogen Latest Ref Range: 7 - 30 mg/dL 30   Creatinine Latest Ref Range: 0.66 - 1.25 mg/dL 1.15   GFR Estimate Latest Ref Range: >60 mL/min/1.7m2 66   GFR Estimate If Black Latest Ref Range: >60 mL/min/1.7m2 80   Calcium Latest Ref Range: 8.5 - 10.1 mg/dL 8.2 (L)   Anion Gap Latest Ref Range: 3 - 14 mmol/L 5   Magnesium Latest Ref Range: 1.6 - 2.3 mg/dL 1.9   Phosphorus Latest Ref Range: 2.5 - 4.5 mg/dL 3.2   Albumin Latest Ref Range: 3.4 - 5.0 g/dL 3.3 (L)   Protein Total  Latest Ref Range: 6.8 - 8.8 g/dL 7.0   Bilirubin Total Latest Ref Range: 0.2 - 1.3 mg/dL 0.7   Alkaline Phosphatase Latest Ref Range: 40 - 150 U/L 212 (H)   ALT Latest Ref Range: 0 - 70 U/L 93 (H)   AST Latest Ref Range: 0 - 45 U/L 80 (H)   Bilirubin Direct Latest Ref Range: 0.0 - 0.2 mg/dL 0.2   Glucose Latest Ref Range: 70 - 99 mg/dL 246 (H)   WBC Latest Ref Range: 4.0 - 11.0 10e9/L 3.8 (L)   Hemoglobin Latest Ref Range: 13.3 - 17.7 g/dL 9.1 (L)   Hematocrit Latest Ref Range: 40.0 - 53.0 % 27.9 (L)   Platelet Count Latest Ref Range: 150 - 450 10e9/L 59 (L)   RBC Count Latest Ref Range: 4.4 - 5.9 10e12/L 3.03 (L)   MCV Latest Ref Range: 78 - 100 fl 92   MCH Latest Ref Range: 26.5 - 33.0 pg 30.0   MCHC Latest Ref Range: 31.5 - 36.5 g/dL 32.6   RDW Latest Ref Range: 10.0 - 15.0 % 17.0 (H)   Tacrolimus Last Dose Unknown 2130 03/18/2018   Tacrolimus Level Latest Ref Range: 5.0 - 15.0 ug/L <3.0 (L)

## 2018-03-22 DIAGNOSIS — N18.30 CKD (CHRONIC KIDNEY DISEASE) STAGE 3, GFR 30-59 ML/MIN (H): ICD-10-CM

## 2018-03-22 DIAGNOSIS — Z94.4 LIVER REPLACED BY TRANSPLANT (H): ICD-10-CM

## 2018-03-22 LAB
ALBUMIN SERPL-MCNC: 3.5 G/DL (ref 3.4–5)
ALP SERPL-CCNC: 210 U/L (ref 40–150)
ALT SERPL W P-5'-P-CCNC: 105 U/L (ref 0–70)
ANION GAP SERPL CALCULATED.3IONS-SCNC: 5 MMOL/L (ref 3–14)
AST SERPL W P-5'-P-CCNC: 99 U/L (ref 0–45)
BILIRUB DIRECT SERPL-MCNC: 0.2 MG/DL (ref 0–0.2)
BILIRUB SERPL-MCNC: 0.6 MG/DL (ref 0.2–1.3)
BUN SERPL-MCNC: 32 MG/DL (ref 7–30)
CALCIUM SERPL-MCNC: 8.4 MG/DL (ref 8.5–10.1)
CHLORIDE SERPL-SCNC: 105 MMOL/L (ref 94–109)
CO2 SERPL-SCNC: 29 MMOL/L (ref 20–32)
CREAT SERPL-MCNC: 1.17 MG/DL (ref 0.66–1.25)
ERYTHROCYTE [DISTWIDTH] IN BLOOD BY AUTOMATED COUNT: 17.6 % (ref 10–15)
GFR SERPL CREATININE-BSD FRML MDRD: 65 ML/MIN/1.7M2
GLUCOSE SERPL-MCNC: 114 MG/DL (ref 70–99)
HCT VFR BLD AUTO: 29.7 % (ref 40–53)
HGB BLD-MCNC: 9.8 G/DL (ref 13.3–17.7)
MAGNESIUM SERPL-MCNC: 2 MG/DL (ref 1.6–2.3)
MCH RBC QN AUTO: 30.2 PG (ref 26.5–33)
MCHC RBC AUTO-ENTMCNC: 33 G/DL (ref 31.5–36.5)
MCV RBC AUTO: 92 FL (ref 78–100)
PHOSPHATE SERPL-MCNC: 3.5 MG/DL (ref 2.5–4.5)
PLATELET # BLD AUTO: 65 10E9/L (ref 150–450)
POTASSIUM SERPL-SCNC: 4.7 MMOL/L (ref 3.4–5.3)
PROT SERPL-MCNC: 6.9 G/DL (ref 6.8–8.8)
RBC # BLD AUTO: 3.24 10E12/L (ref 4.4–5.9)
SODIUM SERPL-SCNC: 139 MMOL/L (ref 133–144)
TACROLIMUS BLD-MCNC: <3 UG/L (ref 5–15)
TME LAST DOSE: ABNORMAL H
WBC # BLD AUTO: 4.1 10E9/L (ref 4–11)

## 2018-03-23 ENCOUNTER — TELEPHONE (OUTPATIENT)
Dept: TRANSPLANT | Facility: CLINIC | Age: 53
End: 2018-03-23

## 2018-03-23 NOTE — TELEPHONE ENCOUNTER
Patient Call: Patient Call: Transplant Lab/Orders  Route to LPN  Post Transplant Days: 384  When patient is less than 60 days post-transplant, route high priority  Reason for Call: Discuss lab results; which results? RDW  Callback needed? Yes  Return Call Needed  Same as documented in contacts section  When to return call?: Greater than one day: Route standard priority

## 2018-03-26 NOTE — TELEPHONE ENCOUNTER
Message left for Camacho that the repeat labs are about the same, and have been sent to be reviewed by .  Requested Camacho call back if having any new symptoms, or c/o and plan to repeat labs on Wed when he is at the clinic to see colo/rectal surgery for f/u.   Spoke with Camacho who states he feels great, no c/o.   He requests to have the labs repeated on 4/06 when he sees .   Will plan for this unless  requests labs sooner.

## 2018-03-27 ENCOUNTER — TELEPHONE (OUTPATIENT)
Dept: TRANSPLANT | Facility: CLINIC | Age: 53
End: 2018-03-27

## 2018-03-27 NOTE — TELEPHONE ENCOUNTER
Pt called and first stated had BP questions and then stated he wanted orders for Thyroid testing labs done to talk with coordinator

## 2018-03-28 ENCOUNTER — OFFICE VISIT (OUTPATIENT)
Dept: SURGERY | Facility: CLINIC | Age: 53
End: 2018-03-28
Payer: COMMERCIAL

## 2018-03-28 VITALS
HEART RATE: 81 BPM | DIASTOLIC BLOOD PRESSURE: 87 MMHG | TEMPERATURE: 98.7 F | BODY MASS INDEX: 27.5 KG/M2 | SYSTOLIC BLOOD PRESSURE: 153 MMHG | OXYGEN SATURATION: 100 % | WEIGHT: 203 LBS | HEIGHT: 72 IN

## 2018-03-28 DIAGNOSIS — Z09 FOLLOW-UP EXAMINATION FOLLOWING SURGERY: Primary | ICD-10-CM

## 2018-03-28 ASSESSMENT — PAIN SCALES - GENERAL: PAINLEVEL: NO PAIN (0)

## 2018-03-28 NOTE — LETTER
3/28/2018       RE: Camacho Bhagat  [address] [facility] [id number]     Dear Colleague,    Thank you for referring your patient, Camacho Bhagat, to the [facility] COLON AND RECTAL SURGERY at [facility]. Please see a copy of my visit note below.    Colon and Rectal Surgery Postoperative Clinic Note    RE: Camacho Bhagat  : 1965  BISI: 3/28/18    Camacho Bhagat is a very pleasant 52 year old male with history of liver transplant (3/2017) who underwent emergent extended right hemicolectomy in the setting of an acute abdomen. He underwent ileostomy takedown (2018) c/b abdominal wall abscess at former drain site and is now s/p EUA with I and D on 18. He has been seen in clinic for follow up with Caryn Busch NP with abscess near prior drain site. He was treated with oral antibiotics and presents today for follow up.    Interval history: Camacho reports that he is completely healed. He has some mild abdominal pain in the RUQ but he is not needing any pain medications for this. He denies any difficulty with bowel movements. His midline incision has healed. He has no additional questions or concerns today.    PLEASE SEE NOTE BELOW FOR PHYSICAL EXAMINATION, REVIEW OF SYSTEMS, AND OTHER HISTORY.      Assessment/Plan:  On exam the firmness in the right lower quadrant is almost completely resolved. Site is now healed externally and no drainage. Non tender. Midline incision completely healed. He can follow up with our clinic as needed. Patient's questions were answered to his stated satisfaction and he is in agreement with this plan.    Total time was 15 minutes, over 50% was spent counseling the patient. This is a postoperative visit.     Sara Dinh MD  Colon and Rectal Surgery Attending  Department of Surgery  [facility]    PLEASE SEE NOTE BELOW FOR PHYSICAL EXAMINATION, REVIEW OF SYSTEMS, AND OTHER  HISTORY.  -------------------------------------------------------------------------------------------------------------------     Medical history:  Past Medical History:   Diagnosis Date     Depressive disorder, not elsewhere classified      Diabetes type 2, controlled (H) 11/10/2016     Esophageal reflux      Fibromyalgia 1/2009    dx with Dr Benitez( Rheum)     Gangrene of finger (H) 8/25/2017     H/O deep venous thrombosis 11/2001    Permanent IVC filter in place.     H/O CASTAÑEDA (nonalcoholic steatohepatitis)      H/O Pneumonia, organism unspecified(486) 10/2001    Included ARDS, sepsis, and  acute renal failure; hospitalized, required tracheostomy placement.     H/O: HTN (hypertension) 11/2001    No longer prescribed antihypertensive medication.       History of hepatocellular carcinoma      History of liver transplant (H)      History of obstructive sleep apnea     No longer wears CPAP since losing approximately 200 pounds with his liver transplant and its complications.       HLD (hyperlipidemia)      Ischemia of both lower extremities 8/25/2017    Distal ischemia due to shock/high pressor requirements     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     Osteoarthritis      Presence of PERMANENT IVC filter      Rheumatoid arthritis(714.0)        Surgical history:  Past Surgical History:   Procedure Laterality Date     BENCH LIVER N/A 3/4/2017    Procedure: BENCH LIVER;  Surgeon: Jovan Tran MD;  [email or web address] TOTAL KNEE ARTHROPLASTY  2008    Right knee arthroscopy     CHOLECYSTECTOMY       COLONOSCOPY N/A 7/21/2017    Procedure: COMBINED COLONOSCOPY, SINGLE OR MULTIPLE BIOPSY/POLYPECTOMY BY BIOPSY;  Colonoscopy;  Surgeon: Izaiah Montes MD;  Location:  GI     ENT SURGERY      Repair of deviated septum     ESOPHAGOSCOPY, GASTROSCOPY, DUODENOSCOPY (EGD), COMBINED N/A 8/4/2016    Procedure: COMBINED ESOPHAGOSCOPY, GASTROSCOPY, DUODENOSCOPY (EGD), BIOPSY SINGLE OR MULTIPLE;  Surgeon: Trent Pederson  MD KELSEA;  Location: [facility]     ESOPHAGOSCOPY, GASTROSCOPY, DUODENOSCOPY (EGD), COMBINED N/A 2017    Procedure: COMBINED ESOPHAGOSCOPY, GASTROSCOPY, DUODENOSCOPY (EGD);;  Surgeon: oLs Wynn MD;  Location: [facility] GI     INCISION AND DRAINAGE ABDOMEN WASHOUT, COMBINED Right 2018    Procedure: COMBINED INCISION AND DRAINAGE ABDOMEN WASHOUT;  COMBINED INCISION AND DRAINAGE right ABDOMEN flank;  Surgeon: Sara Dinh MD;  Location: [facility] OR     LAPAROTOMY EXPLORATORY N/A 2017    Procedure: LAPAROTOMY EXPLORATORY;  Exploratory Laparotomy, washout;  Surgeon: Tip Zhang MD;  Location: [facility] OR     LAPAROTOMY EXPLORATORY N/A 2017    Procedure: LAPAROTOMY EXPLORATORY;  Exploratory Laparotomy, Washout with closure.;  Surgeon: Tip Zhang MD;  Location: [facility] OR     LAPAROTOMY EXPLORATORY N/A 2017    Procedure: LAPAROTOMY EXPLORATORY;  Exploratory Laparotomy, Right angelique-colectomy, end ileostomy, mucosal fistula, partial omentectomy;  Surgeon: Sara Dinh MD;  Location: [facility] OR     ORTHOPEDIC SURGERY Right     Repair of dislocated wrist.       RELEASE TRIGGER FINGER Right 11/10/2017    Procedure: RELEASE TRIGGER FINGER;  Debride, V-Y Flap Right Index Finger;  Surgeon: Momo Noonan MD;  Location: [facility]     TAKEDOWN ILEOSTOMY N/A 2018    Procedure: TAKEDOWN ILEOSTOMY;  Exploratory Laparotomy, Lysis of Adhesions, Takedown Of End Ileostomy, Takedown of mucocutaneous fistula, ileocolic resection  And Colorectal Anastomosis, Primary repair of Ventral Hernia, Anesthesia Block ;  Surgeon: Sara Dinh MD;  Location: [facility] OR     TRACHEOSTOMY  2001    During hospitalization for pneumonia.       TRANSPLANT LIVER RECIPIENT  DONOR N/A 3/4/2017    Procedure: TRANSPLANT LIVER RECIPIENT  DONOR;  Surgeon: Jovan Tran MD;  Location: [facility] OR       Problem list:    Patient Active Problem List    Diagnosis Date Noted     Abscess,  intra-abdominal, postoperative (H) 02/13/2018     Priority: Medium     Ileostomy in place (H) 01/05/2018     Priority: Medium     Peristomal skin complication 11/16/2017     Priority: Medium     Painful periwound skin 11/16/2017     Priority: Medium     Encounter for attention to ileostomy (H) 11/16/2017     Priority: Medium     History of creation of ostomy 10/30/2017     Priority: Medium     Elevated serum creatinine 10/16/2017     Priority: Medium     Pancytopenia (H) 08/25/2017     Priority: Medium     Gangrene of finger (H) 08/25/2017     Priority: Medium     Ischemia of both lower extremities 08/25/2017     Priority: Medium     Distal ischemia due to shock/high pressor requirements       S/P liver transplant (H) 07/26/2017     Priority: Medium     Neutropenic colitis (H) 07/04/2017     Priority: Medium     Immunosuppressed status (H) 03/10/2017     Priority: Medium     Liver transplant recipient (H) 03/04/2017     Priority: Medium     Hyperkalemia 02/14/2017     Priority: Medium     Hepatocellular carcinoma (H) 01/27/2016     Priority: Medium     Osteoarthritis 01/18/2015     Priority: Medium     Rheumatoid arthritis (H) 01/18/2015     Priority: Medium     Medication refill- do not delete  11/13/2013     Priority: Medium     Fibromyalgia 08/15/2011     Priority: Medium     Sicca syndrome (H) 08/15/2011     Priority: Medium     Testicular hypofunction      Priority: Medium     Problem list name updated by automated process. Provider to review       Carpal tunnel syndrome 07/08/2002     Priority: Medium       Medications:  Current Outpatient Prescriptions   Medication Sig Dispense Refill     metoprolol succinate (TOPROL-XL) 25 MG 24 hr tablet Take 2 tablets (50 mg) by mouth daily 180 tablet 3     sodium bicarbonate 650 MG tablet Take 1 tablet (650 mg) by mouth daily 1 tablet 0     oxyCODONE IR (ROXICODONE) 10 MG tablet Take 0.5 tablets (5 mg) by mouth every 6 hours as needed for moderate to severe pain 15 tablet  0     tacrolimus (GENERIC EQUIVALENT) 1 MG capsule Take 1 capsule (1 mg) by mouth every 12 hours 60 capsule 11     predniSONE (DELTASONE) 2.5 MG tablet Take 1 tablet (2.5 mg) by mouth daily 90 tablet 3     furosemide (LASIX) 20 MG tablet Take 2 tablets (40 mg) by mouth 2 times daily 200 tablet 3     cholecalciferol (VITAMIN D3) 1000 UNIT tablet Take 1 tablet (1,000 Units) by mouth daily 100 tablet 3     mycophenolic acid (GENERIC EQUIVALENT) 360 MG EC tablet Take 2 tablets (720 mg) by mouth every 12 hours 120 tablet 3     amLODIPine (NORVASC) 10 MG tablet Take 1 tablet (10 mg) by mouth daily (Patient taking differently: Take 10 mg by mouth every morning ) 90 tablet 3     CIALIS 5 MG tablet Take 5 mg by mouth as needed   1     OMEPRAZOLE PO Take 20 mg by mouth every morning        GABAPENTIN PO Take 300 mg by mouth 3 times daily       insulin glargine (LANTUS) 100 UNIT/ML injection Inject 50 Units Subcutaneous every morning       linagliptin (TRADJENTA) 5 MG TABS tablet Take 5 mg by mouth daily        cyclobenzaprine (FLEXERIL) 10 MG tablet Take 1 tablet (10 mg) by mouth 3 times daily as needed for muscle spasms 90 tablet 0       Allergies:  Allergies   Allergen Reactions     Erythromycin GI Disturbance     Vioxx      Nausea, vomiting       Family history:  Family History   Problem Relation Age of Onset     DIABETES Father      Hypertension Father      Substance Abuse Father      Arthritis Mother      Thyroid Cancer Mother      Survivor!     Cervical Cancer Maternal Grandmother      CEREBROVASCULAR DISEASE Maternal Grandfather      Prostate Cancer Paternal Grandfather      Colon Cancer No family hx of      Hyperlipidemia No family hx of      Coronary Artery Disease No family hx of      Breast Cancer No family hx of        Social history:  Social History   Substance Use Topics     Smoking status: Former Smoker     Packs/day: 0.75     Years: 2.00     Quit date: 1988     Smokeless tobacco: Former User     Types: Chew      Quit date: 10/8/2015     Alcohol use No      Comment: No alcohol since liver transplant.       Marital status: .    Nursing Notes:   Yonathan Vasquez LPN  3/28/2018  2:17 PM  Signed  Chief Complaint   Patient presents with     Surgical Followup     Post op.        Vitals:    03/28/18 1415   BP: 153/87   BP Location: Left arm   Patient Position: Chair   Cuff Size: Adult Large   Pulse: 81   Temp: 98.7  F (37.1  C)   TempSrc: Oral   SpO2: 100%   Weight: 203 lb   Height: 6'       Body mass index is 27.53 kg/(m^2).  Blayne HENRY LPN    Physical Examination:   /87 (BP Location: Left arm, Patient Position: Chair, Cuff Size: Adult Large)  Pulse 81  Temp 98.7  F (37.1  C) (Oral)  Ht 6'  Wt 203 lb  SpO2 100%  BMI 27.53 kg/m2  General: alert, oriented, in no acute distress, sitting comfortably  HEENT: mucous membranes moist  Abdomen: No further induration in the right lower quadrant. External site healed. Non tender. No erythema. Midline incision healed.      Again, thank you for allowing me to participate in the care of your patient.      Sincerely,    Sara Dinh MD

## 2018-03-28 NOTE — PROGRESS NOTES
Colon and Rectal Surgery Postoperative Clinic Note    RE: Camacho Bhagat  : 1965  BISI: 3/28/18    Camacho Bhagat is a very pleasant 52 year old male with history of liver transplant (3/2017) who underwent emergent extended right hemicolectomy in the setting of an acute abdomen. He underwent ileostomy takedown (2018) c/b abdominal wall abscess at former drain site and is now s/p EUA with I and D on 18. He has been seen in clinic for follow up with Caryn Busch NP with abscess near prior drain site. He was treated with oral antibiotics and presents today for follow up.    Interval history: Camacho reports that he is completely healed. He has some mild abdominal pain in the RUQ but he is not needing any pain medications for this. He denies any difficulty with bowel movements. His midline incision has healed. He has no additional questions or concerns today.    PLEASE SEE NOTE BELOW FOR PHYSICAL EXAMINATION, REVIEW OF SYSTEMS, AND OTHER HISTORY.      Assessment/Plan:  On exam the firmness in the right lower quadrant is almost completely resolved. Site is now healed externally and no drainage. Non tender. Midline incision completely healed. He can follow up with our clinic as needed. Patient's questions were answered to his stated satisfaction and he is in agreement with this plan.    Total time was 15 minutes, over 50% was spent counseling the patient. This is a postoperative visit.     Sara Dinh MD  Colon and Rectal Surgery Attending  Department of Surgery  [facility]    PLEASE SEE NOTE BELOW FOR PHYSICAL EXAMINATION, REVIEW OF SYSTEMS, AND OTHER HISTORY.  -------------------------------------------------------------------------------------------------------------------     Medical history:  Past Medical History:   Diagnosis Date     Depressive disorder, not elsewhere classified      Diabetes type 2, controlled (H) 11/10/2016     Esophageal reflux       Fibromyalgia 1/2009    dx with Dr Benitez( Rheum)     Gangrene of finger (H) 8/25/2017     H/O deep venous thrombosis 11/2001    Permanent IVC filter in place.     H/O CASTAÑEDA (nonalcoholic steatohepatitis)      H/O Pneumonia, organism unspecified(486) 10/2001    Included ARDS, sepsis, and  acute renal failure; hospitalized, required tracheostomy placement.     H/O: HTN (hypertension) 11/2001    No longer prescribed antihypertensive medication.       History of hepatocellular carcinoma      History of liver transplant (H)      History of obstructive sleep apnea     No longer wears CPAP since losing approximately 200 pounds with his liver transplant and its complications.       HLD (hyperlipidemia)      Ischemia of both lower extremities 8/25/2017    Distal ischemia due to shock/high pressor requirements     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     Osteoarthritis      Presence of PERMANENT IVC filter      Rheumatoid arthritis(714.0)        Surgical history:  Past Surgical History:   Procedure Laterality Date     BENCH LIVER N/A 3/4/2017    Procedure: BENCH LIVER;  Surgeon: Jovan Tran MD;  Location: [facility] TOTAL KNEE ARTHROPLASTY  2008    Right knee arthroscopy     CHOLECYSTECTOMY       COLONOSCOPY N/A 7/21/2017    Procedure: COMBINED COLONOSCOPY, SINGLE OR MULTIPLE BIOPSY/POLYPECTOMY BY BIOPSY;  Colonoscopy;  Surgeon: Izaiah Montes MD;  Location:  GI     ENT SURGERY      Repair of deviated septum     ESOPHAGOSCOPY, GASTROSCOPY, DUODENOSCOPY (EGD), COMBINED N/A 8/4/2016    Procedure: COMBINED ESOPHAGOSCOPY, GASTROSCOPY, DUODENOSCOPY (EGD), BIOPSY SINGLE OR MULTIPLE;  Surgeon: Trent Pederson MD;  Location:  GI     ESOPHAGOSCOPY, GASTROSCOPY, DUODENOSCOPY (EGD), COMBINED N/A 8/27/2017    Procedure: COMBINED ESOPHAGOSCOPY, GASTROSCOPY, DUODENOSCOPY (EGD);;  Surgeon: Los Wynn MD;  Location:  GI     INCISION AND DRAINAGE ABDOMEN WASHOUT, COMBINED Right 2/14/2018    Procedure:  COMBINED INCISION AND DRAINAGE ABDOMEN WASHOUT;  COMBINED INCISION AND DRAINAGE right ABDOMEN flank;  Surgeon: Sara Dinh MD;  Location: [facility] OR     LAPAROTOMY EXPLORATORY N/A 2017    Procedure: LAPAROTOMY EXPLORATORY;  Exploratory Laparotomy, washout;  Surgeon: Tip Zhang MD;  Location: [facility] OR     LAPAROTOMY EXPLORATORY N/A 2017    Procedure: LAPAROTOMY EXPLORATORY;  Exploratory Laparotomy, Washout with closure.;  Surgeon: Tip Zhang MD;  Location: [facility] OR     LAPAROTOMY EXPLORATORY N/A 2017    Procedure: LAPAROTOMY EXPLORATORY;  Exploratory Laparotomy, Right angelique-colectomy, end ileostomy, mucosal fistula, partial omentectomy;  Surgeon: Sara Dinh MD;  Location: [facility] OR     ORTHOPEDIC SURGERY Right     Repair of dislocated wrist.       RELEASE TRIGGER FINGER Right 11/10/2017    Procedure: RELEASE TRIGGER FINGER;  Debride, V-Y Flap Right Index Finger;  Surgeon: Momo Noonan MD;  Location: [facility] OR     TAKEDOWN ILEOSTOMY N/A 2018    Procedure: TAKEDOWN ILEOSTOMY;  Exploratory Laparotomy, Lysis of Adhesions, Takedown Of End Ileostomy, Takedown of mucocutaneous fistula, ileocolic resection  And Colorectal Anastomosis, Primary repair of Ventral Hernia, Anesthesia Block ;  Surgeon: Sara Dinh MD;  Location: [facility] OR     TRACHEOSTOMY  2001    During hospitalization for pneumonia.       TRANSPLANT LIVER RECIPIENT  DONOR N/A 3/4/2017    Procedure: TRANSPLANT LIVER RECIPIENT  DONOR;  Surgeon: Jovan Tran MD;  Location: [facility] OR       Problem list:    Patient Active Problem List    Diagnosis Date Noted     Abscess, intra-abdominal, postoperative (H) 2018     Priority: Medium     Ileostomy in place (H) 2018     Priority: Medium     Peristomal skin complication 2017     Priority: Medium     Painful periwound skin 2017     Priority: Medium     Encounter for attention to ileostomy (H) 2017      Priority: Medium     History of creation of ostomy 10/30/2017     Priority: Medium     Elevated serum creatinine 10/16/2017     Priority: Medium     Pancytopenia (H) 08/25/2017     Priority: Medium     Gangrene of finger (H) 08/25/2017     Priority: Medium     Ischemia of both lower extremities 08/25/2017     Priority: Medium     Distal ischemia due to shock/high pressor requirements       S/P liver transplant (H) 07/26/2017     Priority: Medium     Neutropenic colitis (H) 07/04/2017     Priority: Medium     Immunosuppressed status (H) 03/10/2017     Priority: Medium     Liver transplant recipient (H) 03/04/2017     Priority: Medium     Hyperkalemia 02/14/2017     Priority: Medium     Hepatocellular carcinoma (H) 01/27/2016     Priority: Medium     Osteoarthritis 01/18/2015     Priority: Medium     Rheumatoid arthritis (H) 01/18/2015     Priority: Medium     Medication refill- do not delete  11/13/2013     Priority: Medium     Fibromyalgia 08/15/2011     Priority: Medium     Sicca syndrome (H) 08/15/2011     Priority: Medium     Testicular hypofunction      Priority: Medium     Problem list name updated by automated process. Provider to review       Carpal tunnel syndrome 07/08/2002     Priority: Medium       Medications:  Current Outpatient Prescriptions   Medication Sig Dispense Refill     metoprolol succinate (TOPROL-XL) 25 MG 24 hr tablet Take 2 tablets (50 mg) by mouth daily 180 tablet 3     sodium bicarbonate 650 MG tablet Take 1 tablet (650 mg) by mouth daily 1 tablet 0     oxyCODONE IR (ROXICODONE) 10 MG tablet Take 0.5 tablets (5 mg) by mouth every 6 hours as needed for moderate to severe pain 15 tablet 0     tacrolimus (GENERIC EQUIVALENT) 1 MG capsule Take 1 capsule (1 mg) by mouth every 12 hours 60 capsule 11     predniSONE (DELTASONE) 2.5 MG tablet Take 1 tablet (2.5 mg) by mouth daily 90 tablet 3     furosemide (LASIX) 20 MG tablet Take 2 tablets (40 mg) by mouth 2 times daily 200 tablet 3      cholecalciferol (VITAMIN D3) 1000 UNIT tablet Take 1 tablet (1,000 Units) by mouth daily 100 tablet 3     mycophenolic acid (GENERIC EQUIVALENT) 360 MG EC tablet Take 2 tablets (720 mg) by mouth every 12 hours 120 tablet 3     amLODIPine (NORVASC) 10 MG tablet Take 1 tablet (10 mg) by mouth daily (Patient taking differently: Take 10 mg by mouth every morning ) 90 tablet 3     CIALIS 5 MG tablet Take 5 mg by mouth as needed   1     OMEPRAZOLE PO Take 20 mg by mouth every morning        GABAPENTIN PO Take 300 mg by mouth 3 times daily       insulin glargine (LANTUS) 100 UNIT/ML injection Inject 50 Units Subcutaneous every morning       linagliptin (TRADJENTA) 5 MG TABS tablet Take 5 mg by mouth daily        cyclobenzaprine (FLEXERIL) 10 MG tablet Take 1 tablet (10 mg) by mouth 3 times daily as needed for muscle spasms 90 tablet 0       Allergies:  Allergies   Allergen Reactions     Erythromycin GI Disturbance     Vioxx      Nausea, vomiting       Family history:  Family History   Problem Relation Age of Onset     DIABETES Father      Hypertension Father      Substance Abuse Father      Arthritis Mother      Thyroid Cancer Mother      Survivor!     Cervical Cancer Maternal Grandmother      CEREBROVASCULAR DISEASE Maternal Grandfather      Prostate Cancer Paternal Grandfather      Colon Cancer No family hx of      Hyperlipidemia No family hx of      Coronary Artery Disease No family hx of      Breast Cancer No family hx of        Social history:  Social History   Substance Use Topics     Smoking status: Former Smoker     Packs/day: 0.75     Years: 2.00     Quit date: 1988     Smokeless tobacco: Former User     Types: Chew     Quit date: 10/8/2015     Alcohol use No      Comment: No alcohol since liver transplant.       Marital status: .    Nursing Notes:   Yonathan Vasquze LPN  3/28/2018  2:17 PM  Signed  Chief Complaint   Patient presents with     Surgical Followup     Post op.        Vitals:    03/28/18 1415    BP: 153/87   BP Location: Left arm   Patient Position: Chair   Cuff Size: Adult Large   Pulse: 81   Temp: 98.7  F (37.1  C)   TempSrc: Oral   SpO2: 100%   Weight: 203 lb   Height: 6'       Body mass index is 27.53 kg/(m^2).  Blayne X, LPN                        Physical Examination:   /87 (BP Location: Left arm, Patient Position: Chair, Cuff Size: Adult Large)  Pulse 81  Temp 98.7  F (37.1  C) (Oral)  Ht 6'  Wt 203 lb  SpO2 100%  BMI 27.53 kg/m2  General: alert, oriented, in no acute distress, sitting comfortably  HEENT: mucous membranes moist  Abdomen: No further induration in the right lower quadrant. External site healed. Non tender. No erythema. Midline incision healed.

## 2018-03-28 NOTE — NURSING NOTE
Chief Complaint   Patient presents with     Surgical Followup     Post op.        Vitals:    03/28/18 1415   BP: 153/87   BP Location: Left arm   Patient Position: Chair   Cuff Size: Adult Large   Pulse: 81   Temp: 98.7  F (37.1  C)   TempSrc: Oral   SpO2: 100%   Weight: 203 lb   Height: 6'       Body mass index is 27.53 kg/(m^2).  Blayne HENRY LPN

## 2018-03-28 NOTE — MR AVS SNAPSHOT
After Visit Summary   3/28/2018    Camacho Bhagat    MRN: [id number]           Patient Information     Date Of Birth          1965        Visit Information        Provider Department      3/28/2018 2:00 PM Sara Dinh MD [facility] Colon and Rectal Surgery        Today's Diagnoses     Follow-up examination following surgery    -  1       Follow-ups after your visit        Your next 10 appointments already scheduled     Apr 04, 2018 10:45 AM CDT   (Arrive by 10:15 AM)   Return Visit with Jael Rubio MD   [facility] Nephrology ([facility])    [address]  [address]  [facility] [id number] [phone]            Apr 18, 2018  5:30 PM CDT   (Arrive by 5:15 PM)   NEW DIABETES with Kiana Ching MD   [facility] Endocrinology ([facility])    [address]  3rd Floor  [facility] [id number] [phone]            Jun 06, 2018 12:45 PM CDT   (Arrive by 12:30 PM)   Return Liver Transplant with Toñito Camejo MD   [facility] Hepatology ([facility])    [address]  [address]  [facility] [id number] [phone]              Who to contact     Please call your clinic at [phone] to:    Ask questions about your health    Make or cancel appointments    Discuss your medicines    Learn about your test results    Speak to your doctor            Additional Information About Your Visit        Engagement[employer] Information     [employer] gives you secure access to your electronic health record. If you see a primary care provider, you can also send messages to your care team and make appointments. If you have questions, please call your primary care clinic.  If you do not have a primary care provider, please call [phone] and they will assist you.      [employer] is an electronic gateway that provides easy, online access to your medical records. With [employer], you can request a clinic  appointment, read your test results, renew a prescription or communicate with your care team.     To access your existing account, please contact your [facility] or call [phone] for assistance.        Care EveryWhere ID     This is your Care EveryWhere ID. This could be used by other organizations to access your [place] medical records  [id number]        Your Vitals Were     Pulse Temperature Height Pulse Oximetry BMI (Body Mass Index)       81 98.7  F (37.1  C) (Oral) 6' 100% 27.53 kg/m2        Blood Pressure from Last 3 Encounters:   03/28/18 153/87   03/21/18 125/76   03/06/18 131/83    Weight from Last 3 Encounters:   03/28/18 203 lb   03/21/18 207 lb 3.2 oz   03/06/18 191 lb              Today, you had the following     No orders found for display         Today's Medication Changes          These changes are accurate as of 3/28/18  5:58 PM.  If you have any questions, ask your nurse or doctor.               These medicines have changed or have updated prescriptions.        Dose/Directions    amLODIPine 10 MG tablet   Commonly known as:  NORVASC   This may have changed:  when to take this   Used for:  HTN (hypertension)        Dose:  10 mg   Take 1 tablet (10 mg) by mouth daily   Quantity:  90 tablet   Refills:  3                Primary Care Provider Office Phone # Fax #    Shay Kirkpatrick [phone]       [address] [place] [address] [address] [place] [address]        Equal Access to Services     FRANKLIN PORTILLO AH: Hadii tavo Carlisle, waaxda luqadaha, qaybta kaalmada magdaleno, devi duckworth. So [facility] [phone].    ATENCIÓN: Si habla español, tiene a zheng disposición servicios gratuitos de asistencia lingüística. Llame al [phone].    We comply with applicable federal civil rights laws and [place] laws. We do not discriminate on the basis of race, color, national origin, age, disability, sex, sexual orientation, or gender  identity.            Thank you!     Thank you for choosing [facility] COLON AND RECTAL SURGERY  for your care. Our goal is always to provide you with excellent care. Hearing back from our patients is one way we can continue to improve our services. Please take a few minutes to complete the written survey that you may receive in the mail after your visit with us. Thank you!             Your Updated Medication List - Protect others around you: Learn how to safely use, store and throw away your medicines at www.disposemymeds.org.          This list is accurate as of 3/28/18  5:58 PM.  Always use your most recent med list.                   Brand Name Dispense Instructions for use Diagnosis    amLODIPine 10 MG tablet    NORVASC    90 tablet    Take 1 tablet (10 mg) by mouth daily    HTN (hypertension)       cholecalciferol 1000 UNIT tablet    vitamin D3    100 tablet    Take 1 tablet (1,000 Units) by mouth daily    Vitamin D deficiency       CIALIS 5 MG tablet   Generic drug:  tadalafil      Take 5 mg by mouth as needed        cyclobenzaprine 10 MG tablet    FLEXERIL    90 tablet    Take 1 tablet (10 mg) by mouth 3 times daily as needed for muscle spasms    Immunosuppression (H)       furosemide 20 MG tablet    LASIX    200 tablet    Take 2 tablets (40 mg) by mouth 2 times daily    Hyperkalemia       GABAPENTIN PO      Take 300 mg by mouth 3 times daily        insulin glargine 100 UNIT/ML injection    LANTUS     Inject 50 Units Subcutaneous every morning        linagliptin 5 MG Tabs tablet    TRADJENTA     Take 5 mg by mouth daily        metoprolol succinate 25 MG 24 hr tablet    TOPROL-XL    180 tablet    Take 2 tablets (50 mg) by mouth daily    Benign essential hypertension       mycophenolic acid 360 MG EC tablet    GENERIC EQUIVALENT    120 tablet    Take 2 tablets (720 mg) by mouth every 12 hours    History of liver transplant (H), Long-term use of immunosuppressant medication       OMEPRAZOLE PO      Take 20 mg by  mouth every morning        oxyCODONE IR 10 MG tablet    ROXICODONE    15 tablet    Take 0.5 tablets (5 mg) by mouth every 6 hours as needed for moderate to severe pain    Intra-abdominal infection, Abdominal abscess (H)       predniSONE 2.5 MG tablet    DELTASONE    90 tablet    Take 1 tablet (2.5 mg) by mouth daily    Liver replaced by transplant (H), Long-term use of immunosuppressant medication       sodium bicarbonate 650 MG tablet     1 tablet    Take 1 tablet (650 mg) by mouth daily    Hyperkalemia       tacrolimus 1 MG capsule    GENERIC EQUIVALENT    60 capsule    Take 1 capsule (1 mg) by mouth every 12 hours    Liver transplant recipient (H)

## 2018-03-30 DIAGNOSIS — Z79.60 LONG-TERM USE OF IMMUNOSUPPRESSANT MEDICATION: ICD-10-CM

## 2018-03-30 DIAGNOSIS — Z94.4 HISTORY OF LIVER TRANSPLANT (H): Primary | ICD-10-CM

## 2018-04-02 DIAGNOSIS — N18.30 CKD (CHRONIC KIDNEY DISEASE) STAGE 3, GFR 30-59 ML/MIN (H): ICD-10-CM

## 2018-04-02 DIAGNOSIS — Z94.4 HISTORY OF LIVER TRANSPLANT (H): ICD-10-CM

## 2018-04-02 DIAGNOSIS — R79.89 ELEVATED SERUM CREATININE: Primary | ICD-10-CM

## 2018-04-02 DIAGNOSIS — Z79.60 LONG-TERM USE OF IMMUNOSUPPRESSANT MEDICATION: ICD-10-CM

## 2018-04-02 LAB
ALBUMIN SERPL-MCNC: 3.4 G/DL (ref 3.4–5)
ALP SERPL-CCNC: 294 U/L (ref 40–150)
ALT SERPL W P-5'-P-CCNC: 80 U/L (ref 0–70)
ANION GAP SERPL CALCULATED.3IONS-SCNC: 4 MMOL/L (ref 3–14)
AST SERPL W P-5'-P-CCNC: 71 U/L (ref 0–45)
BILIRUB DIRECT SERPL-MCNC: 0.2 MG/DL (ref 0–0.2)
BILIRUB SERPL-MCNC: 0.6 MG/DL (ref 0.2–1.3)
BUN SERPL-MCNC: 32 MG/DL (ref 7–30)
CALCIUM SERPL-MCNC: 8.2 MG/DL (ref 8.5–10.1)
CHLORIDE SERPL-SCNC: 108 MMOL/L (ref 94–109)
CHOLEST SERPL-MCNC: 97 MG/DL
CO2 SERPL-SCNC: 26 MMOL/L (ref 20–32)
CREAT SERPL-MCNC: 1.31 MG/DL (ref 0.66–1.25)
CREAT UR-MCNC: 79 MG/DL
ERYTHROCYTE [DISTWIDTH] IN BLOOD BY AUTOMATED COUNT: 17.3 % (ref 10–15)
GFR SERPL CREATININE-BSD FRML MDRD: 57 ML/MIN/1.7M2
GLUCOSE SERPL-MCNC: 114 MG/DL (ref 70–99)
HCT VFR BLD AUTO: 30.9 % (ref 40–53)
HDLC SERPL-MCNC: 36 MG/DL
HGB BLD-MCNC: 10.1 G/DL (ref 13.3–17.7)
LDLC SERPL CALC-MCNC: 39 MG/DL
MAGNESIUM SERPL-MCNC: 2.2 MG/DL (ref 1.6–2.3)
MCH RBC QN AUTO: 30.9 PG (ref 26.5–33)
MCHC RBC AUTO-ENTMCNC: 32.7 G/DL (ref 31.5–36.5)
MCV RBC AUTO: 95 FL (ref 78–100)
NONHDLC SERPL-MCNC: 61 MG/DL
PHOSPHATE SERPL-MCNC: 2.8 MG/DL (ref 2.5–4.5)
PLATELET # BLD AUTO: 63 10E9/L (ref 150–450)
POTASSIUM SERPL-SCNC: 4.8 MMOL/L (ref 3.4–5.3)
PROT SERPL-MCNC: 7.2 G/DL (ref 6.8–8.8)
PROT UR-MCNC: 2.28 G/L
PROT/CREAT 24H UR: 2.91 G/G CR (ref 0–0.2)
RBC # BLD AUTO: 3.27 10E12/L (ref 4.4–5.9)
SODIUM SERPL-SCNC: 138 MMOL/L (ref 133–144)
TACROLIMUS BLD-MCNC: <3 UG/L (ref 5–15)
TME LAST DOSE: ABNORMAL H
TRIGL SERPL-MCNC: 109 MG/DL
TSH SERPL DL<=0.005 MIU/L-ACNC: 2.74 MU/L (ref 0.4–4)
WBC # BLD AUTO: 4.1 10E9/L (ref 4–11)

## 2018-04-02 NOTE — TELEPHONE ENCOUNTER
[id number], spoke with Camacho about liver labs done 3/22. With plan to repeat labs on Monday 4/02.   Camacho is asking about doing a thyroid test, it is listed as being due on his health maintenance on his my chart.     He states that  told him that it would need to be repeated.     Will order this test for him.  Discussed with him that his liver tests are mildly elevated, he reports no changes in his meds, or c/o of any changes in overall health.  He was seen by colorectal surgery and cleared to go back to swimming, his abd incisions are healed, etc.

## 2018-04-04 ENCOUNTER — TELEPHONE (OUTPATIENT)
Dept: TRANSPLANT | Facility: CLINIC | Age: 53
End: 2018-04-04

## 2018-04-04 ENCOUNTER — OFFICE VISIT (OUTPATIENT)
Dept: NEPHROLOGY | Facility: CLINIC | Age: 53
End: 2018-04-04
Attending: INTERNAL MEDICINE
Payer: COMMERCIAL

## 2018-04-04 VITALS
SYSTOLIC BLOOD PRESSURE: 136 MMHG | BODY MASS INDEX: 28.31 KG/M2 | TEMPERATURE: 101.2 F | HEIGHT: 72 IN | WEIGHT: 209 LBS | HEART RATE: 85 BPM | DIASTOLIC BLOOD PRESSURE: 75 MMHG | RESPIRATION RATE: 18 BRPM | OXYGEN SATURATION: 100 %

## 2018-04-04 DIAGNOSIS — I10 BENIGN ESSENTIAL HYPERTENSION: ICD-10-CM

## 2018-04-04 DIAGNOSIS — E87.5 HYPERKALEMIA: ICD-10-CM

## 2018-04-04 DIAGNOSIS — R80.1 PERSISTENT PROTEINURIA: Primary | ICD-10-CM

## 2018-04-04 PROCEDURE — G0463 HOSPITAL OUTPT CLINIC VISIT: HCPCS | Mod: ZF

## 2018-04-04 RX ORDER — LOSARTAN POTASSIUM 25 MG/1
25 TABLET ORAL DAILY
Qty: 30 TABLET | Refills: 11 | Status: SHIPPED | OUTPATIENT
Start: 2018-04-04 | End: 2018-08-22

## 2018-04-04 RX ORDER — FUROSEMIDE 40 MG
40 TABLET ORAL 2 TIMES DAILY
Qty: 60 TABLET | Refills: 11 | Status: SHIPPED | OUTPATIENT
Start: 2018-04-04 | End: 2019-01-25

## 2018-04-04 RX ORDER — METOPROLOL SUCCINATE 50 MG/1
50 TABLET, EXTENDED RELEASE ORAL DAILY
Qty: 30 TABLET | Refills: 11 | Status: SHIPPED | OUTPATIENT
Start: 2018-04-04 | End: 2018-09-14 | Stop reason: DRUGHIGH

## 2018-04-04 ASSESSMENT — PAIN SCALES - GENERAL: PAINLEVEL: NO PAIN (0)

## 2018-04-04 NOTE — TELEPHONE ENCOUNTER
Spoke with Quang JEFFREY, at clinic who will call Camacho about his questions re: BP medications.   He was at the clinic today to see .   She will call him and review issues, clarify questions he has.    He has also sent a my chart message re: concerns about his BP.

## 2018-04-04 NOTE — LETTER
4/4/2018      RE: Camacho Bhagat  [address]  [facility] [id number]       Assessment and Plan:       1. CKD 3b w/proteinuria - Creat 1.1-1.3 mg/dL with recent significant increase in proteinuria. Proteinuria was 1.5 grams in June 2017 and up to 9 grams in January 2018.    - this was in setting of laparotomy, colostomy, abscess s/p drainage, and though complements normal, this may be due to a post infectious process. Other possibilites include DM, recurrent EJ, CNI, ? Underlying IgA with liver failure.   Has been intolerant of ACE/ARB previously given problems with hyperkalemia. Blood pressure improving, and approaching goal. Tac dose has been decreased to undetectable level, but now transaminases rising and hepatology is monitoring    . Blood pressures were in the 160-170/ range at that time.   - immunofixation negative  - proteinuria decreased to 3.59 g/g Cr in March and today is 2.9 g/g cr   UA neg for hematuria.   - diabetic for many years.  A1c 5.1% in January.        - Will continue to work on blood pressure management. Goal is < 1 35/80     - Avoid NSAIDs, nephrotoxins  - if proteinuria does not improve closer to 1g/g cr, will proceed with biopsy   - adding losartan 25mg daily, labs in 2 weeks   - has been on veltassa in the past.      2. HTN - on amlodipine , furosemide 40mg bid, toprol XL   - will start losartan 25mg, increase as tolerated for proteinuria, decrease amlodipine as needed, or toprol, given he has no cardiac history    3. Hyperkalemia assoc with Tac - Appears to be resolved given reduction in Tacrolimus dose. K 4.8 today. Previously on Valtessa and Florinef prior to reduction in Tac dose   - monitor carefully and asked him to moderate K intake in diet.    4. Volume status - seems near euvolemic, no edema even on amlodipine      5. Acid base - Bicarb 26- stop bicarbonate tabs, not needed with ostomy takedown  6. BMD - Ca 8.2, Phos 3.2, albumin 3.3   - Vitamin D 20 and PTH 34  (9/14/17)   - Continue Vit D 1000 U qd      7. Hypomagnesemia -Resolved. Mag 1.9 w/o replacement      8. Anemia - Stable. Hgb 9's , was low in setting of surgery, now up to 10.1, thus slowly recoverning.  - low suspicion for TMA process, monitor    9. Liver transplant IS: Tacrolimus, MMF, Pred   - Tacro dose decreased to undetectable level.     RTC 4 months with me or Jovanna Foster      Assessment and plan was discussed with patient and he voiced his understanding and agreement.      Reason for Visit:  CKD/HTN follow up        HPI:  Mr Bhagat is a 51 yo male in today for CKD and proteinuria followup. He has He has struggled with hyperkalemia attributed to Tacrolimus ( currently on very low dose with undetected level) and recurrent EJ 2/2 large volume ostomy output. Had ostomy takedown on 1/5/18. He had an abscess drained thereafter. He had demonstrated unusual proteinuria post op that is slowly improving, today is at 2.9 g/g after being up to >9 g/g cr in January 2018     He has been seen with Dr Hernández and Jovanan Foster in the past. He has ESLD s/p transplant in 3/2017 and has had some complications since. He had finger and toe grangrene.   In January he was noted with proteinuria of 9 grams. He underwent emergent laparotomy and had a n abscess which was drained in February 2018.     He has history of DM for many years.    Baseline Cr: 1.3 range      ROS:  Feeling well, but worried about liver given rise in transaminases. Major concern today was ongoing weight gain despite diuretics. He notes minimal edema, no dyspnea, improving blood pressures. Having 2-4 stools/day. Denies CP, voiding concerns. Active around the house. Appetite is good.         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 Former smoker, LPN by profession.     Allergies:  Allergies   Allergen Reactions     Erythromycin GI Disturbance     Vioxx      Nausea, vomiting       Medications:  Prior to Admission medications    Medication Sig Start Date End Date Taking? Authorizing Provider   metoprolol succinate (TOPROL-XL) 25 MG 24 hr tablet Take 2 tablets (50 mg) by mouth daily 3/21/18  Yes Cinda Cazares NP   sodium bicarbonate 650 MG tablet Take 1 tablet (650 mg) by mouth daily 3/21/18  Yes Cinda Cazares NP   oxyCODONE IR (ROXICODONE) 10 MG tablet Take 0.5 tablets (5 mg) by mouth every 6 hours as needed for moderate to severe pain 2/15/18  Yes Paulo Hunter MD   tacrolimus (GENERIC EQUIVALENT) 1 MG capsule Take 1 capsule (1 mg) by mouth every 12 hours 2/15/18  Yes Felicia Tolliver APRN CNP   predniSONE (DELTASONE) 2.5 MG tablet Take 1 tablet (2.5 mg) by mouth daily 2/5/18  Yes Jovan Tran MD   furosemide (LASIX) 20 MG tablet Take 2 tablets (60 mg) by mouth 2 times daily 1/31/18  Yes Cinda Cazares NP   cholecalciferol (VITAMIN D3) 1000 UNIT tablet Take 1 tablet (1,000 Units) by mouth daily 12/6/17  Yes Cinda Cazares NP   mycophenolic acid (GENERIC EQUIVALENT) 360 MG EC tablet Take 2 tablets (720 mg) by mouth every 12 hours 11/20/17  Yes Jovan Tran MD   amLODIPine (NORVASC) 10 MG tablet Take 1 tablet (10 mg) by mouth daily  Patient taking differently: Take 10 mg by mouth every morning  11/7/17  Yes Ninfa Hernández MD   CIALIS 5 MG tablet Take 5 mg by mouth as needed  6/7/17  Yes Reported, Patient   OMEPRAZOLE PO Take 20 mg by mouth every morning    Yes Reported, Patient   GABAPENTIN PO Take 300 mg by mouth 3 times daily   Yes Reported, Patient   insulin glargine (LANTUS) 100 UNIT/ML injection  Inject 50 Units Subcutaneous every morning   Yes Reported, Patient   linagliptin (TRADJENTA) 5 MG TABS tablet Take 5 mg by mouth daily    Yes Reported, Patient   cyclobenzaprine (FLEXERIL) 10 MG tablet Take 1 tablet (10 mg) by mouth 3 times daily as needed for muscle spasms 9/8/17  Yes Felicia Tolliver, LAMIN CNP       Vitals:  /75  Pulse 85  Temp 101.2  F (38.4  C) (Oral)  Resp 18  Ht 1.829 m (6')  Wt 94.8 kg (209 lb)  SpO2 100%  BMI 28.35 kg/m2    Exam:  Gen: Calm, pleasant and interactive  CARDIAC: RRR  LUNGS: CTA  ABDOMEN: Abdomin NT. Non distended.  EXT: No edema right LE, 1+ LE edema left LE    Results:  Results for RONNIE ARIZMENDI (MRN [id number]) as of 3/21/2018 14:10   Ref. Range 3/19/2018 11:14   Sodium Latest Ref Range: 133 - 144 mmol/L 140   Potassium Latest Ref Range: 3.4 - 5.3 mmol/L 4.7   Chloride Latest Ref Range: 94 - 109 mmol/L 106   Carbon Dioxide Latest Ref Range: 20 - 32 mmol/L 29   Urea Nitrogen Latest Ref Range: 7 - 30 mg/dL 30   Creatinine Latest Ref Range: 0.66 - 1.25 mg/dL 1.15   GFR Estimate Latest Ref Range: >60 mL/min/1.7m2 66   GFR Estimate If Black Latest Ref Range: >60 mL/min/1.7m2 80   Calcium Latest Ref Range: 8.5 - 10.1 mg/dL 8.2 (L)   Anion Gap Latest Ref Range: 3 - 14 mmol/L 5   Magnesium Latest Ref Range: 1.6 - 2.3 mg/dL 1.9   Phosphorus Latest Ref Range: 2.5 - 4.5 mg/dL 3.2   Albumin Latest Ref Range: 3.4 - 5.0 g/dL 3.3 (L)   Protein Total Latest Ref Range: 6.8 - 8.8 g/dL 7.0   Bilirubin Total Latest Ref Range: 0.2 - 1.3 mg/dL 0.7   Alkaline Phosphatase Latest Ref Range: 40 - 150 U/L 212 (H)   ALT Latest Ref Range: 0 - 70 U/L 93 (H)   AST Latest Ref Range: 0 - 45 U/L 80 (H)   Bilirubin Direct Latest Ref Range: 0.0 - 0.2 mg/dL 0.2   Glucose Latest Ref Range: 70 - 99 mg/dL 246 (H)   WBC Latest Ref Range: 4.0 - 11.0 10e9/L 3.8 (L)   Hemoglobin Latest Ref Range: 13.3 - 17.7 g/dL 9.1 (L)   Hematocrit Latest Ref Range: 40.0 - 53.0 % 27.9 (L)   Platelet Count Latest Ref  Range: 150 - 450 10e9/L 59 (L)   RBC Count Latest Ref Range: 4.4 - 5.9 10e12/L 3.03 (L)   MCV Latest Ref Range: 78 - 100 fl 92   MCH Latest Ref Range: 26.5 - 33.0 pg 30.0   MCHC Latest Ref Range: 31.5 - 36.5 g/dL 32.6   RDW Latest Ref Range: 10.0 - 15.0 % 17.0 (H)   Tacrolimus Last Dose Unknown 2130 03/18/2018   Tacrolimus Level Latest Ref Range: 5.0 - 15.0 ug/L <3.0 (L)       Jael Rubio MD

## 2018-04-04 NOTE — NURSING NOTE
Chief Complaint   Patient presents with     RECHECK     CKD stage III       Initial /75  Pulse 85  Temp 101.2  F (38.4  C) (Oral)  Resp 18  Ht 1.829 m (6')  Wt 94.8 kg (209 lb)  SpO2 100%  BMI 28.35 kg/m2 Estimated body mass index is 28.35 kg/(m^2) as calculated from the following:    Height as of this encounter: 1.829 m (6').    Weight as of this encounter: 94.8 kg (209 lb).  Medication Reconciliation: complete     Katie Harmon [facility]  4/4/2018 10:54 AM

## 2018-04-04 NOTE — TELEPHONE ENCOUNTER
Called patient to discuss his concerns. Sounds like he's mostly concerned about starting losartan due to history of hyperkalemia. Says he does not want to deal with this again. I explained that we would monitor his labs closely and can make adjustments as needed. We could also make adjustments to his other BP medications if his blood pressure get too low.   Patient's other concern is that he wants Dr. Camejo to be aware of and approve these changes to his medications.     I discussed with Dr. Rubio and Dr. Camejo who are both in clinic today. Dr. Rubio will call the patient directly to discuss his concerns and other options if patient really does not want to start losartan.    Quang Conteh, RN, BAN  Nephrology RN Care Coordinator

## 2018-04-04 NOTE — PATIENT INSTRUCTIONS
Preventive Care:    Diabetic Eye Exam Screening: During our visit today, we discussed that it is recommended you receive diabetic eye exam screening. Please call or make an appointment with your primary care provider to discuss this with them. You may also call the [facility] scheduling line ([phone]) to set up an eye exam at one of the [facility].

## 2018-04-04 NOTE — PROGRESS NOTES
Assessment and Plan:       1. CKD 3b w/proteinuria - Creat 1.1-1.3 mg/dL with recent significant increase in proteinuria. Proteinuria was 1.5 grams in June 2017 and up to 9 grams in January 2018.    - this was in setting of laparotomy, colostomy, abscess s/p drainage, and though complements normal, this may be due to a post infectious process. Other possibilites include DM, recurrent EJ, CNI, ? Underlying IgA with liver failure.   Has been intolerant of ACE/ARB previously given problems with hyperkalemia. Blood pressure improving, and approaching goal. Tac dose has been decreased to undetectable level, but now transaminases rising and hepatology is monitoring    . Blood pressures were in the 160-170/ range at that time.   - immunofixation negative  - proteinuria decreased to 3.59 g/g Cr in March and today is 2.9 g/g cr   UA neg for hematuria.   - diabetic for many years.  A1c 5.1% in January.        - Will continue to work on blood pressure management. Goal is < 135/80     - Avoid NSAIDs, nephrotoxins  - if proteinuria does not improve closer to 1g/g cr, will proceed with biopsy   - adding losartan 25mg daily, labs in 2 weeks   - has been on veltassa in the past.      2. HTN - on amlodipine , furosemide 40mg bid, toprol XL   - will start losartan 25mg, increase as tolerated for proteinuria, decrease amlodipine as needed, or toprol, given he has no cardiac history    3. Hyperkalemia assoc with Tac - Appears to be resolved given reduction in Tacrolimus dose. K 4.8 today. Previously on Valtessa and Florinef prior to reduction in Tac dose   - monitor carefully and asked him to moderate K intake in diet.    4. Volume status - seems near euvolemic, no edema even on amlodipine      5. Acid base - Bicarb 26- stop bicarbonate tabs, not needed with ostomy takedown  6. BMD - Ca 8.2, Phos 3.2, albumin 3.3   - Vitamin D 20 and PTH 34 (9/14/17)   - Continue Vit D 1000 U qd      7. Hypomagnesemia -Resolved. Mag 1.9 w/o  replacement      8. Anemia - Stable. Hgb 9's , was low in setting of surgery, now up to 10.1, thus slowly recoverning.  - low suspicion for TMA process, monitor    9. Liver transplant IS: Tacrolimus, MMF, Pred   - Tacro dose decreased to undetectable level.     RTC 4 months with me or Jovanna Foster      Assessment and plan was discussed with patient and he voiced his understanding and agreement.      Reason for Visit:  CKD/HTN follow up        HPI:  Mr Bhagat is a 53 yo male in today for CKD and proteinuria followup. He has He has struggled with hyperkalemia attributed to Tacrolimus ( currently on very low dose with undetected level) and recurrent EJ 2/2 large volume ostomy output. Had ostomy takedown on 1/5/18. He had an abscess drained thereafter. He had demonstrated unusual proteinuria post op that is slowly improving, today is at 2.9 g/g after being up to >9 g/g cr in January 2018     He has been seen with Dr Hernández and Jovanna Foster in the past. He has ESLD s/p transplant in 3/2017 and has had some complications since. He had finger and toe grangrene.   In January he was noted with proteinuria of 9 grams. He underwent emergent laparotomy and had a n abscess which was drained in February 2018.     He has history of DM for many years.    Baseline Cr: 1.3 range      ROS:  Feeling well, but worried about liver given rise in transaminases. Major concern today was ongoing weight gain despite diuretics. He notes minimal edema, no dyspnea, improving blood pressures. Having 2-4 stools/day. Denies CP, voiding concerns. Active around the house. Appetite is good.         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 Former smoker, LPN by profession.     Allergies:  Allergies   Allergen Reactions     Erythromycin GI Disturbance     Vioxx      Nausea, vomiting       Medications:  Prior to Admission medications    Medication Sig Start Date End Date Taking? Authorizing Provider   metoprolol succinate (TOPROL-XL) 25 MG 24 hr tablet Take 2 tablets (50 mg) by mouth daily 3/21/18  Yes Cinda Cazares NP   sodium bicarbonate 650 MG tablet Take 1 tablet (650 mg) by mouth daily 3/21/18  Yes Cinda Cazares NP   oxyCODONE IR (ROXICODONE) 10 MG tablet Take 0.5 tablets (5 mg) by mouth every 6 hours as needed for moderate to severe pain 2/15/18  Yes Paulo Hunter MD   tacrolimus (GENERIC EQUIVALENT) 1 MG capsule Take 1 capsule (1 mg) by mouth every 12 hours 2/15/18  Yes Felicia Tolliver APRN CNP   predniSONE (DELTASONE) 2.5 MG tablet Take 1 tablet (2.5 mg) by mouth daily 2/5/18  Yes Jovan Tran MD   furosemide (LASIX) 20 MG tablet Take 2 tablets (60 mg) by mouth 2 times daily 1/31/18  Yes Cinda Cazares NP   cholecalciferol (VITAMIN D3) 1000 UNIT tablet Take 1 tablet (1,000 Units) by mouth daily 12/6/17  Yes Cinda Cazares NP   mycophenolic acid (GENERIC EQUIVALENT) 360 MG EC tablet Take 2 tablets (720 mg) by mouth every 12 hours 11/20/17  Yes Jovan Tran MD   amLODIPine (NORVASC) 10 MG tablet Take 1 tablet (10 mg) by mouth daily  Patient taking differently: Take 10 mg by mouth every morning  11/7/17  Yes Ninfa Hernández MD   CIALIS 5 MG tablet Take 5 mg by mouth as needed  6/7/17  Yes Reported, Patient   OMEPRAZOLE PO Take 20 mg by mouth every morning    Yes Reported, Patient   GABAPENTIN PO Take 300 mg by mouth 3 times daily   Yes Reported, Patient   insulin glargine (LANTUS) 100 UNIT/ML injection Inject 50 Units Subcutaneous every morning   Yes Reported, Patient   linagliptin (TRADJENTA) 5 MG TABS tablet Take 5 mg by mouth daily    Yes Reported, Patient    cyclobenzaprine (FLEXERIL) 10 MG tablet Take 1 tablet (10 mg) by mouth 3 times daily as needed for muscle spasms 9/8/17  Yes Felicia Tolliver, APRN CNP       Vitals:  /75  Pulse 85  Temp 101.2  F (38.4  C) (Oral)  Resp 18  Ht 1.829 m (6')  Wt 94.8 kg (209 lb)  SpO2 100%  BMI 28.35 kg/m2    Exam:  Gen: Calm, pleasant and interactive  CARDIAC: RRR  LUNGS: CTA  ABDOMEN: Abdomin NT. Non distended.  EXT: No edema right LE, 1+ LE edema left LE    Results:  Results for RONNIE ARIZMENDI (MRN [id number]) as of 3/21/2018 14:10   Ref. Range 3/19/2018 11:14   Sodium Latest Ref Range: 133 - 144 mmol/L 140   Potassium Latest Ref Range: 3.4 - 5.3 mmol/L 4.7   Chloride Latest Ref Range: 94 - 109 mmol/L 106   Carbon Dioxide Latest Ref Range: 20 - 32 mmol/L 29   Urea Nitrogen Latest Ref Range: 7 - 30 mg/dL 30   Creatinine Latest Ref Range: 0.66 - 1.25 mg/dL 1.15   GFR Estimate Latest Ref Range: >60 mL/min/1.7m2 66   GFR Estimate If Black Latest Ref Range: >60 mL/min/1.7m2 80   Calcium Latest Ref Range: 8.5 - 10.1 mg/dL 8.2 (L)   Anion Gap Latest Ref Range: 3 - 14 mmol/L 5   Magnesium Latest Ref Range: 1.6 - 2.3 mg/dL 1.9   Phosphorus Latest Ref Range: 2.5 - 4.5 mg/dL 3.2   Albumin Latest Ref Range: 3.4 - 5.0 g/dL 3.3 (L)   Protein Total Latest Ref Range: 6.8 - 8.8 g/dL 7.0   Bilirubin Total Latest Ref Range: 0.2 - 1.3 mg/dL 0.7   Alkaline Phosphatase Latest Ref Range: 40 - 150 U/L 212 (H)   ALT Latest Ref Range: 0 - 70 U/L 93 (H)   AST Latest Ref Range: 0 - 45 U/L 80 (H)   Bilirubin Direct Latest Ref Range: 0.0 - 0.2 mg/dL 0.2   Glucose Latest Ref Range: 70 - 99 mg/dL 246 (H)   WBC Latest Ref Range: 4.0 - 11.0 10e9/L 3.8 (L)   Hemoglobin Latest Ref Range: 13.3 - 17.7 g/dL 9.1 (L)   Hematocrit Latest Ref Range: 40.0 - 53.0 % 27.9 (L)   Platelet Count Latest Ref Range: 150 - 450 10e9/L 59 (L)   RBC Count Latest Ref Range: 4.4 - 5.9 10e12/L 3.03 (L)   MCV Latest Ref Range: 78 - 100 fl 92   MCH Latest Ref Range: 26.5 - 33.0  pg 30.0   MCHC Latest Ref Range: 31.5 - 36.5 g/dL 32.6   RDW Latest Ref Range: 10.0 - 15.0 % 17.0 (H)   Tacrolimus Last Dose Unknown 2130 03/18/2018   Tacrolimus Level Latest Ref Range: 5.0 - 15.0 ug/L <3.0 (L)               Answers for HPI/ROS submitted by the patient on 3/26/2018   General Symptoms: No  Skin Symptoms: No  HENT Symptoms: No  EYE SYMPTOMS: No  HEART SYMPTOMS: No  LUNG SYMPTOMS: No  INTESTINAL SYMPTOMS: No  URINARY SYMPTOMS: No  REPRODUCTIVE SYMPTOMS: No  SKELETAL SYMPTOMS: No  BLOOD SYMPTOMS: No  NERVOUS SYSTEM SYMPTOMS: No  MENTAL HEALTH SYMPTOMS: No

## 2018-04-04 NOTE — TELEPHONE ENCOUNTER
Patient Call: Patient Call: Medication Clarification    Patient was prescribed 3 hypertension medications and wants to speak directly with nurse before taking them. He did not give specific medication information and disconnected call before further information was obtained.

## 2018-04-04 NOTE — MR AVS SNAPSHOT
After Visit Summary   4/4/2018    Camacho Bhagat    MRN: [id number]           Patient Information     Date Of Birth          1965        Visit Information        Provider Department      4/4/2018 10:45 AM Jael Rubio MD [facility] Nephrology        Today's Diagnoses     Persistent proteinuria    -  1    Hyperkalemia        Benign essential hypertension          Care Instructions    Preventive Care:    Diabetic Eye Exam Screening: During our visit today, we discussed that it is recommended you receive diabetic eye exam screening. Please call or make an appointment with your primary care provider to discuss this with them. You may also call the [facility] scheduling line ([phone]) to set up an eye exam at one of the [facility] Clinics.                Follow-ups after your visit        Follow-up notes from your care team     Return in about 4 months (around 8/4/2018).      Your next 10 appointments already scheduled     Apr 18, 2018  5:30 PM CDT   (Arrive by 5:15 PM)   NEW DIABETES with Kiana Ching MD   [facility] Endocrinology ([facility])    [address]  3rd Floor  [facility] [id number] [phone]            Jun 06, 2018 12:45 PM CDT   (Arrive by 12:30 PM)   Return Liver Transplant with Toñito Camejo MD   [facility] Hepatology ([facility])    [address] [place] [address]  [address]  [facility] [id number] [phone]            Aug 15, 2018 11:10 AM CDT   (Arrive by 10:40 AM)   Return Visit with Jael Rubio MD   [facility] Nephrology ([facility])    [address] [place] [address]  [address]  [facility] [address]   [phone]              Future tests that were ordered for you today     Open Future Orders        Priority Expected Expires Ordered    Renal panel Routine  5/4/2018 4/4/2018    Protein  random urine with Creat Ratio Routine  5/4/2018 4/4/2018            Who to contact      If you have questions or need follow up information about today's clinic visit or your schedule please contact [facility] directly at [phone].  Normal or non-critical lab and imaging results will be communicated to you by Zomatohart, letter or phone within 4 business days after the clinic has received the results. If you do not hear from us within 7 days, please contact the clinic through Investview[employer] or phone. If you have a critical or abnormal lab result, we will notify you by phone as soon as possible.  Submit refill requests through [employer] or call your pharmacy and they will forward the refill request to us. Please allow 3 business days for your refill to be completed.          Additional Information About Your Visit        [employer] Information     [employer] gives you secure access to your electronic health record. If you see a primary care provider, you can also send messages to your care team and make appointments. If you have questions, please call your primary care clinic.  If you do not have a primary care provider, please call [phone] and they will assist you.        Care EveryWhere ID     This is your Care EveryWhere ID. This could be used by other organizations to access your [facility] medical records  [id number]        Your Vitals Were     Pulse Temperature Respirations Height Pulse Oximetry BMI (Body Mass Index)    85 101.2  F (38.4  C) (Oral) 18 1.829 m (6') 100% 28.35 kg/m2       Blood Pressure from Last 3 Encounters:   04/04/18 136/75   03/28/18 153/87   03/21/18 125/76    Weight from Last 3 Encounters:   04/04/18 94.8 kg (209 lb)   03/28/18 92.1 kg (203 lb)   03/21/18 94 kg (207 lb 3.2 oz)                 Today's Medication Changes          These changes are accurate as of 4/4/18 11:33 AM.  If you have any questions, ask your nurse or doctor.               Start taking these medicines.        Dose/Directions    losartan 25 MG tablet   Commonly known as:  COZAAR   Used for:  Persistent  proteinuria, Hyperkalemia, Benign essential hypertension   Started by:  Jael Rubio MD        Dose:  25 mg   Take 1 tablet (25 mg) by mouth daily   Quantity:  30 tablet   Refills:  11         These medicines have changed or have updated prescriptions.        Dose/Directions    amLODIPine 10 MG tablet   Commonly known as:  NORVASC   This may have changed:  when to take this   Used for:  HTN (hypertension)        Dose:  10 mg   Take 1 tablet (10 mg) by mouth daily   Quantity:  90 tablet   Refills:  3       furosemide 40 MG tablet   Commonly known as:  LASIX   This may have changed:  medication strength   Used for:  Hyperkalemia, Persistent proteinuria, Benign essential hypertension   Changed by:  Jael Rubio MD        Dose:  40 mg   Take 1 tablet (40 mg) by mouth 2 times daily   Quantity:  60 tablet   Refills:  11       metoprolol succinate 50 MG 24 hr tablet   Commonly known as:  TOPROL-XL   This may have changed:  medication strength   Used for:  Benign essential hypertension, Persistent proteinuria, Hyperkalemia   Changed by:  Jael Rubio MD        Dose:  50 mg   Take 1 tablet (50 mg) by mouth daily   Quantity:  30 tablet   Refills:  11         Stop taking these medicines if you haven't already. Please contact your care team if you have questions.     sodium bicarbonate 650 MG tablet   Stopped by:  Jael Rubio MD                Where to get your medicines      These medications were sent to [facility] [address] [place] [address] AT [facility] [address]  [address] [facility] [address]     Phone:  [phone]     furosemide 40 MG tablet    losartan 25 MG tablet    metoprolol succinate 50 MG 24 hr tablet                Primary Care Provider Office Phone # Fax #    Shay Kirkpatrick [phone]       [address] [place] [address]  [facility] [address]        Equal Access to Services     FRANKLIN PORTILLO AH: Todd lawrence  Orestes, wakpda luqadaha, qakeshiata kazeb dolan, devi macias rachelmin ellischristal donita. So [facility] [phone].    ATENCIÓN: Si brandt garcia, tiene a zheng disposición servicios gratuitos de asistencia lingüística. Shahla al [phone].    We comply with applicable federal civil rights laws and [place] laws. We do not discriminate on the basis of race, color, national origin, age, disability, sex, sexual orientation, or gender identity.            Thank you!     Thank you for choosing [facility]  for your care. Our goal is always to provide you with excellent care. Hearing back from our patients is one way we can continue to improve our services. Please take a few minutes to complete the written survey that you may receive in the mail after your visit with us. Thank you!             Your Updated Medication List - Protect others around you: Learn how to safely use, store and throw away your medicines at www.disposemymeds.org.          This list is accurate as of 4/4/18 11:33 AM.  Always use your most recent med list.                   Brand Name Dispense Instructions for use Diagnosis    amLODIPine 10 MG tablet    NORVASC    90 tablet    Take 1 tablet (10 mg) by mouth daily    HTN (hypertension)       cholecalciferol 1000 UNIT tablet    vitamin D3    100 tablet    Take 1 tablet (1,000 Units) by mouth daily    Vitamin D deficiency       CIALIS 5 MG tablet   Generic drug:  tadalafil      Take 5 mg by mouth as needed        cyclobenzaprine 10 MG tablet    FLEXERIL    90 tablet    Take 1 tablet (10 mg) by mouth 3 times daily as needed for muscle spasms    Immunosuppression (H)       furosemide 40 MG tablet    LASIX    60 tablet    Take 1 tablet (40 mg) by mouth 2 times daily    Hyperkalemia, Persistent proteinuria, Benign essential hypertension       GABAPENTIN PO      Take 300 mg by mouth 3 times daily        insulin glargine 100 UNIT/ML injection    LANTUS     Inject 50 Units Subcutaneous every morning         linagliptin 5 MG Tabs tablet    TRADJENTA     Take 5 mg by mouth daily        losartan 25 MG tablet    COZAAR    30 tablet    Take 1 tablet (25 mg) by mouth daily    Persistent proteinuria, Hyperkalemia, Benign essential hypertension       metoprolol succinate 50 MG 24 hr tablet    TOPROL-XL    30 tablet    Take 1 tablet (50 mg) by mouth daily    Benign essential hypertension, Persistent proteinuria, Hyperkalemia       mycophenolic acid 360 MG EC tablet    GENERIC EQUIVALENT    120 tablet    Take 2 tablets (720 mg) by mouth every 12 hours    History of liver transplant (H), Long-term use of immunosuppressant medication       OMEPRAZOLE PO      Take 20 mg by mouth every morning        oxyCODONE IR 10 MG tablet    ROXICODONE    15 tablet    Take 0.5 tablets (5 mg) by mouth every 6 hours as needed for moderate to severe pain    Intra-abdominal infection, Abdominal abscess (H)       predniSONE 2.5 MG tablet    DELTASONE    90 tablet    Take 1 tablet (2.5 mg) by mouth daily    Liver replaced by transplant (H), Long-term use of immunosuppressant medication       tacrolimus 1 MG capsule    GENERIC EQUIVALENT    60 capsule    Take 1 capsule (1 mg) by mouth every 12 hours    Liver transplant recipient (H)

## 2018-04-09 ENCOUNTER — CARE COORDINATION (OUTPATIENT)
Dept: NEPHROLOGY | Facility: CLINIC | Age: 53
End: 2018-04-09

## 2018-04-09 NOTE — PROGRESS NOTES
Left VM for patient to return call to follow up from his appointment last week.     He had called after his appointment and expressed concerns about starting losartan due to history of hyperkalemia. I discussed with Dr. Rubio and Dr. Camejo who were both in clinic, and Dr. Camejo was in agreement with starting losartan if we monitor patient's labs closely. Dr. Rubio attempted to call Camacho last week, but was only able to leave a voice mail.      Will wait for return call from Camacho to discuss whether he would like to start losartan now or not.    Quang Conteh, WOJCIECH

## 2018-04-09 NOTE — PROGRESS NOTES
Received call back from Camacho. He said he did get Dr. Rubio's voicemail and is okay with trying losartan. Took his first dose this morning, and would like to monitor labs on Wednesday and Friday this week.     Quang Conteh RN

## 2018-04-11 ENCOUNTER — OFFICE VISIT (OUTPATIENT)
Dept: ENDOCRINOLOGY | Facility: CLINIC | Age: 53
End: 2018-04-11
Payer: COMMERCIAL

## 2018-04-11 VITALS
HEART RATE: 83 BPM | WEIGHT: 205 LBS | DIASTOLIC BLOOD PRESSURE: 82 MMHG | BODY MASS INDEX: 27.77 KG/M2 | HEIGHT: 72 IN | SYSTOLIC BLOOD PRESSURE: 155 MMHG

## 2018-04-11 DIAGNOSIS — E87.5 HYPERKALEMIA: ICD-10-CM

## 2018-04-11 DIAGNOSIS — R79.89 ELEVATED SERUM CREATININE: ICD-10-CM

## 2018-04-11 DIAGNOSIS — I10 BENIGN ESSENTIAL HYPERTENSION: ICD-10-CM

## 2018-04-11 DIAGNOSIS — E11.42 TYPE 2 DIABETES MELLITUS WITH DIABETIC POLYNEUROPATHY, WITH LONG-TERM CURRENT USE OF INSULIN (H): Primary | ICD-10-CM

## 2018-04-11 DIAGNOSIS — Z79.4 TYPE 2 DIABETES MELLITUS WITH DIABETIC POLYNEUROPATHY, WITH LONG-TERM CURRENT USE OF INSULIN (H): Primary | ICD-10-CM

## 2018-04-11 DIAGNOSIS — Z79.4 TYPE 2 DIABETES MELLITUS WITH DIABETIC POLYNEUROPATHY, WITH LONG-TERM CURRENT USE OF INSULIN (H): ICD-10-CM

## 2018-04-11 DIAGNOSIS — E11.42 TYPE 2 DIABETES MELLITUS WITH DIABETIC POLYNEUROPATHY, WITH LONG-TERM CURRENT USE OF INSULIN (H): ICD-10-CM

## 2018-04-11 DIAGNOSIS — R80.1 PERSISTENT PROTEINURIA: ICD-10-CM

## 2018-04-11 LAB
ALBUMIN SERPL-MCNC: 2.8 G/DL (ref 3.4–5)
ALBUMIN UR-MCNC: 100 MG/DL
ANION GAP SERPL CALCULATED.3IONS-SCNC: 7 MMOL/L (ref 3–14)
APPEARANCE UR: ABNORMAL
BILIRUB UR QL STRIP: NEGATIVE
BUN SERPL-MCNC: 34 MG/DL (ref 7–30)
CALCIUM SERPL-MCNC: 8.4 MG/DL (ref 8.5–10.1)
CHLORIDE SERPL-SCNC: 105 MMOL/L (ref 94–109)
CO2 SERPL-SCNC: 24 MMOL/L (ref 20–32)
COLOR UR AUTO: YELLOW
CREAT SERPL-MCNC: 1.11 MG/DL (ref 0.66–1.25)
CREAT UR-MCNC: 60 MG/DL
GFR SERPL CREATININE-BSD FRML MDRD: 69 ML/MIN/1.7M2
GLUCOSE SERPL-MCNC: 212 MG/DL (ref 70–99)
GLUCOSE UR STRIP-MCNC: >499 MG/DL
HBA1C MFR BLD: 5.5 % (ref 0–6.4)
HGB UR QL STRIP: ABNORMAL
HYALINE CASTS #/AREA URNS LPF: 1 /LPF (ref 0–2)
KETONES UR STRIP-MCNC: NEGATIVE MG/DL
LEUKOCYTE ESTERASE UR QL STRIP: NEGATIVE
MUCOUS THREADS #/AREA URNS LPF: PRESENT /LPF
NITRATE UR QL: NEGATIVE
PH UR STRIP: 5 PH (ref 5–7)
PHOSPHATE SERPL-MCNC: 3.2 MG/DL (ref 2.5–4.5)
POTASSIUM SERPL-SCNC: 4.8 MMOL/L (ref 3.4–5.3)
PROT UR-MCNC: 1.42 G/L
PROT/CREAT 24H UR: 2.35 G/G CR (ref 0–0.2)
RBC #/AREA URNS AUTO: 3 /HPF (ref 0–2)
SODIUM SERPL-SCNC: 136 MMOL/L (ref 133–144)
SOURCE: ABNORMAL
SP GR UR STRIP: 1.01 (ref 1–1.03)
UROBILINOGEN UR STRIP-MCNC: 0 MG/DL (ref 0–2)
WBC #/AREA URNS AUTO: <1 /HPF (ref 0–5)

## 2018-04-11 RX ORDER — GLUCOSAMINE HCL/CHONDROITIN SU 500-400 MG
1 CAPSULE ORAL 4 TIMES DAILY
Qty: 100 EACH | Refills: 11 | Status: SHIPPED | OUTPATIENT
Start: 2018-04-11 | End: 2019-05-13

## 2018-04-11 NOTE — PROGRESS NOTES
Endocrinology Clinic Visit      April 11, 2018      Chief Complaint: DMII   Referred by: Dr. Camejo    Subjective:         HPI: Camacho Bhagat is a 52 year old year old male who is here for a clinic visit.   he  has a past medical history of Depressive disorder, not elsewhere classified; Diabetes type 2, controlled (H) (11/10/2016); Esophageal reflux; Fibromyalgia (1/2009); Gangrene of finger (H) (8/25/2017); H/O deep venous thrombosis (11/2001); H/O CASTAÑEDA (nonalcoholic steatohepatitis); H/O Pneumonia, organism unspecified(486) (10/2001); H/O: HTN (hypertension) (11/2001); History of hepatocellular carcinoma; History of liver transplant (H); History of obstructive sleep apnea; HLD (hyperlipidemia); Ischemia of both lower extremities (8/25/2017); Neutropenic colitis (H) (7/4/2017); Osteoarthritis; Presence of PERMANENT IVC filter; and Rheumatoid arthritis(714.0).    History of Diabetes  Type 2   This was diagnosed in 2002.  Oral meds first, later placed on insulin for the last 6-7 years.     Related history: h/o CASTAÑEDA cirrohcis +/- work exposures, and HCC s/p liver transplant 3/2017. Course complicated by acute abdomen/neutropenic fever requiring right hemicolectomy and colostomy Fall 2017. He later underwent colostomy takedown 1/5/18 which was complicated by abdominal wall abscess at a drain site. During this course, he has also developed excessive proteinuria which is followed by nephrology.     Current DM Regimen:  -lantus 50u qam  -trajdenta 5mg in AM    Related meds:  Prednisone started for transplant 2.5mg per day. Started spring 2018 as tacro had to be decreased due to SE of hyperkalemia.     Testing -   Once daily, varies times, either pre meal or 1.5-2h post meal   Normal range is-   Highest he has seen on glucometer in last month was 140    Hypo -reports rare, 1-2x per month, wakes up in 70s couple times per week but does not consider this a low. Lows have previously occurred randomly, no clear pattern to  patient, though it seems to happen if he skips a meal or eats low carb meal.   EMS called for BG 20 1 year ago, unclear if this was before or after transplant. Has glucagon at home.  Sx - feels drunk, sweaty, shakiness when BG gets below 70s.  Corrects the hypoglycemic episodes by having something to eat.  Recently, he has not been checking his blood glucose at the time he is symptomatic.    Previously followed by [facility] [place], last visit 12/2017. Was on same meds at that time. Is transferring as his MD there is leaving the clinic.       Current Treatment  Diabetes Medication(s)     Dipeptidyl Peptidase-4 (DPP-4) Inhibitors Sig    linagliptin (TRADJENTA) 5 MG TABS tablet Take 5 mg by mouth daily     Insulin Sig    insulin glargine (LANTUS) 100 UNIT/ML injection Inject 50 Units Subcutaneous every morning            Meter Download Summary: did not bring meter      Diet: 2 meals per day  Breakfast: varies, example - english muffin, sausage egg/cheese buscuit   Lunch: skips   Dinner: rarely eats out,cooks at home, example - baked chicken, veggies, rice  Snacks: none  Alcohol or sugared beverages: none.     Exercise   Walks dog every day, 1-2 miles    Weight trend  Wt Readings from Last 4 Encounters:   04/11/18 93 kg (205 lb)   04/04/18 94.8 kg (209 lb)   03/28/18 92.1 kg (203 lb)   03/21/18 94 kg (207 lb 3.2 oz)         Complications    + chronic complications.     1. Retinopathy: No known eye disease  Last eye exam was on late 2016 . Due for repeat.     2.  Nephropathy: yes, though unclear if related to DM vs post transplant complications.  He has significant proteinuria and has been evaluated by nephrology.  Recent GFR in the upper 50s.  He was started on 25 mg losartan on 4/4/18.  Albumin Urine mg/g Cr   Date Value Ref Range Status   10/27/2017 132.46 (H) 0 - 17 mg/g Cr Final     Medication (Note: This includes the hypertensive combination subclass to make sure to show all ACEI/ARB's.)     Angiotensin  II Receptor Antagonists Sig    losartan (COZAAR) 25 MG tablet Take 1 tablet (25 mg) by mouth daily          3. Neuropathy - left foot - pins/needles sensation.   Last monofilament testing: today was normal.     4. Hypoglycemia: yes, rarely, see above    5. Macrovascular: none     6. Lipids: not on statin , last LDL in 30s      A1c today is   Lab Results   Component Value Date    A1C 5.1 01/06/2018    A1C  07/06/2017     Canceled, Test credited   Below Assay Range  NOTIFIED LEONARD ONEILL AT 0538 ON 7/6/17 BY [facility]      A1C 9.4 02/16/2017    A1C 6.2 12/14/2016    A1C 6.1 10/27/2016            Allergies   Allergen Reactions     Erythromycin GI Disturbance     Vioxx      Nausea, vomiting       Current Outpatient Prescriptions   Medication Sig Dispense Refill     amLODIPine (NORVASC) 10 MG tablet Take 1 tablet (10 mg) by mouth daily (Patient taking differently: Take 10 mg by mouth every morning ) 90 tablet 3     losartan (COZAAR) 25 MG tablet Take 1 tablet (25 mg) by mouth daily 30 tablet 11     furosemide (LASIX) 40 MG tablet Take 1 tablet (40 mg) by mouth 2 times daily 60 tablet 11     metoprolol succinate (TOPROL-XL) 50 MG 24 hr tablet Take 1 tablet (50 mg) by mouth daily 30 tablet 11     oxyCODONE IR (ROXICODONE) 10 MG tablet Take 0.5 tablets (5 mg) by mouth every 6 hours as needed for moderate to severe pain 15 tablet 0     tacrolimus (GENERIC EQUIVALENT) 1 MG capsule Take 1 capsule (1 mg) by mouth every 12 hours 60 capsule 11     predniSONE (DELTASONE) 2.5 MG tablet Take 1 tablet (2.5 mg) by mouth daily 90 tablet 3     cholecalciferol (VITAMIN D3) 1000 UNIT tablet Take 1 tablet (1,000 Units) by mouth daily 100 tablet 3     mycophenolic acid (GENERIC EQUIVALENT) 360 MG EC tablet Take 2 tablets (720 mg) by mouth every 12 hours 120 tablet 3     CIALIS 5 MG tablet Take 5 mg by mouth as needed   1     OMEPRAZOLE PO Take 20 mg by mouth every morning        GABAPENTIN PO Take 300 mg by mouth 3 times daily       insulin  glargine (LANTUS) 100 UNIT/ML injection Inject 50 Units Subcutaneous every morning       linagliptin (TRADJENTA) 5 MG TABS tablet Take 5 mg by mouth daily        cyclobenzaprine (FLEXERIL) 10 MG tablet Take 1 tablet (10 mg) by mouth 3 times daily as needed for muscle spasms 90 tablet 0       Review of Systems     Constitutional: Negative for fever and unexpected weight change. Appetite is good   Head: no headache   Eye: no vision change/ loss of peripheral vision.   ENT: No throat congestion.   Respiratory: no cough   Cardiovascular: no chest pain or tachycardia  Gastrointestinal: Negative for vomiting, abdominal pain and diarrhea.  Genitourinary: No bladder problems.  Musculoskeletal: Negative for myalgias. No weakness.  Neurological: Negative for seizures and headaches.  Psychiatric/Behavioral: Negative for behavioral problem and dysphoric mood.    Past Medical History:   Diagnosis Date     Depressive disorder, not elsewhere classified      Diabetes type 2, controlled (H) 11/10/2016     Esophageal reflux      Fibromyalgia 1/2009    dx with Dr Benitez( [facility])     Gangrene of finger (H) 8/25/2017     H/O deep venous thrombosis 11/2001    Permanent IVC filter in place.     H/O CASTAÑEDA (nonalcoholic steatohepatitis)      H/O Pneumonia, organism unspecified(486) 10/2001    Included ARDS, sepsis, and  acute renal failure; hospitalized, required tracheostomy placement.     H/O: HTN (hypertension) 11/2001    No longer prescribed antihypertensive medication.       History of hepatocellular carcinoma      History of liver transplant (H)      History of obstructive sleep apnea     No longer wears CPAP since losing approximately 200 pounds with his liver transplant and its complications.       HLD (hyperlipidemia)      Ischemia of both lower extremities 8/25/2017    Distal ischemia due to shock/high pressor requirements     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     Osteoarthritis      Presence of PERMANENT IVC filter      Rheumatoid  arthritis(714.0)        Past Surgical History:   Procedure Laterality Date     BENCH LIVER N/A 3/4/2017    Procedure: BENCH LIVER;  Surgeon: Jovan Tran MD;  Location: [facility] OR     C TOTAL KNEE ARTHROPLASTY  2008    Right knee arthroscopy     CHOLECYSTECTOMY       COLONOSCOPY N/A 7/21/2017    Procedure: COMBINED COLONOSCOPY, SINGLE OR MULTIPLE BIOPSY/POLYPECTOMY BY BIOPSY;  Colonoscopy;  Surgeon: Izaiah Montes MD;  Location:  GI     ENT SURGERY      Repair of deviated septum     ESOPHAGOSCOPY, GASTROSCOPY, DUODENOSCOPY (EGD), COMBINED N/A 8/4/2016    Procedure: COMBINED ESOPHAGOSCOPY, GASTROSCOPY, DUODENOSCOPY (EGD), BIOPSY SINGLE OR MULTIPLE;  Surgeon: Trent Pederson MD;  Location:  GI     ESOPHAGOSCOPY, GASTROSCOPY, DUODENOSCOPY (EGD), COMBINED N/A 8/27/2017    Procedure: COMBINED ESOPHAGOSCOPY, GASTROSCOPY, DUODENOSCOPY (EGD);;  Surgeon: Los Wynn MD;  Location: [facility] GI     INCISION AND DRAINAGE ABDOMEN WASHOUT, COMBINED Right 2/14/2018    Procedure: COMBINED INCISION AND DRAINAGE ABDOMEN WASHOUT;  COMBINED INCISION AND DRAINAGE right ABDOMEN flank;  Surgeon: Sara Dinh MD;  Location: [facility] OR     LAPAROTOMY EXPLORATORY N/A 7/4/2017    Procedure: LAPAROTOMY EXPLORATORY;  Exploratory Laparotomy, washout;  Surgeon: Tip Zhang MD;  Location: [facility]     LAPAROTOMY EXPLORATORY N/A 7/5/2017    Procedure: LAPAROTOMY EXPLORATORY;  Exploratory Laparotomy, Washout with closure.;  Surgeon: Tpi Zhang MD;  Location: [facility]     LAPAROTOMY EXPLORATORY N/A 7/26/2017    Procedure: LAPAROTOMY EXPLORATORY;  Exploratory Laparotomy, Right angelique-colectomy, end ileostomy, mucosal fistula, partial omentectomy;  Surgeon: Sara Dinh MD;  Location: [facility] OR     ORTHOPEDIC SURGERY Right     Repair of dislocated wrist.       RELEASE TRIGGER FINGER Right 11/10/2017    Procedure: RELEASE TRIGGER FINGER;  Debride, V-Y Flap Right Index Finger;  Surgeon: Saran  Momo Sibley MD;  Location: [facility]     TAKEDOWN ILEOSTOMY N/A 2018    Procedure: TAKEDOWN ILEOSTOMY;  Exploratory Laparotomy, Lysis of Adhesions, Takedown Of End Ileostomy, Takedown of mucocutaneous fistula, ileocolic resection  And Colorectal Anastomosis, Primary repair of Ventral Hernia, Anesthesia Block ;  Surgeon: Sara Dinh MD;  Location: [facility] OR     TRACHEOSTOMY  2001    During hospitalization for pneumonia.       TRANSPLANT LIVER RECIPIENT  DONOR N/A 3/4/2017    Procedure: TRANSPLANT LIVER RECIPIENT  DONOR;  Surgeon: Jovan Tran MD;  Location: [facility] OR       Family History   Problem Relation Age of Onset     DIABETES Father      Hypertension Father      Substance Abuse Father      Arthritis Mother      Thyroid Cancer Mother      Survivor!     Cervical Cancer Maternal Grandmother      CEREBROVASCULAR DISEASE Maternal Grandfather      Prostate Cancer Paternal Grandfather      Colon Cancer No family hx of      Hyperlipidemia No family hx of      Coronary Artery Disease No family hx of      Breast Cancer No family hx of          Social History     Social History     Marital status: ,      Spouse name: N/A     Number of children: 1     Years of education: N/A     Occupational History            Social History Main Topics     Smoking status: Former Smoker     Packs/day: 0.75     Years: 2.00     Quit date:      Smokeless tobacco: Former User     Types: Chew     Quit date: 10/8/2015     Alcohol use No      Comment: No alcohol since liver transplant.       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, not currently working         Lives alone    Objective:   /82  Pulse 83  Ht 1.829 m (6')  Wt 93 kg (205 lb)  BMI 27.8 kg/m2    General: well developed, well nourished, in  NAD  Eyes: anicteric, normal extra-occular movements, no lid lag, no stare  HEENT: MMM, oropharynx clear, no LAD, no thyrmegaly   CV: RRR, normal S1/S2, no MRG  Pulm: CTAB, decreased breath sounds on right > left lung  Abd: soft, non tender, non distended, multiple healed surgical scars, no notable lipohypertrophy  Neuro: alert, CNII-XII intact, strength 5/5 in all 4 extremities, sensation intact to light touch  Extremities: no LE edema  Skin: No lesions noted, normal texture  Feet:2+ DP pulses, no wounds/lesions, monofilament intact throughout.   Musculoskeletal: Right index finger amputation, osteoarthritic changes of the fingers    In House Labs:   Lab Results   Component Value Date    A1C 5.1 01/06/2018    A1C  07/06/2017     Canceled, Test credited   Below Assay Range  NOTIFIED LEONARD ONEILL AT 0538 ON 7/6/17 BY [facility]      A1C 9.4 02/16/2017    A1C 6.2 12/14/2016    A1C 6.1 10/27/2016       TSH   Date Value Ref Range Status   04/02/2018 2.74 0.40 - 4.00 mU/L Final   02/08/2016 3.35 0.40 - 4.00 mU/L Final   04/11/2011 2.77 0.4 - 5.0 mU/L Final   02/15/2010 2.53 0.4 - 5.0 mU/L Final   07/31/2009 1.95 0.4 - 5.0 mU/L Final     T4 Free   Date Value Ref Range Status   04/11/2011 1.23 0.70 - 1.85 ng/dL Final   02/15/2010 0.90 0.70 - 1.85 ng/dL Final       Creatinine   Date Value Ref Range Status   04/02/2018 1.31 (H) 0.66 - 1.25 mg/dL Final   ]    Recent Labs   Lab Test  04/02/18   1042  02/26/18   1111   07/03/12   0710   CHOL  97  117   < >  133   HDL  36*  30*   < >  35*   LDL  39  29   < >  62   TRIG  109  293*   < >  182*   CHOLHDLRATIO   --    --    --   3.8    < > = values in this interval not displayed.       No results found for: [id number], [id number], [id number]      Assessment/Treatment Plan:      Camacho Bhagat is a 52 year old year old male with    1. Type 2 Diabetes: last A1c 5.5% today, though not entirely interpretable given h/o anemia.   There is some evidence of chronic diabetic complications,  including left sided neuropathy and nephropathy, though both of these may be caused by alternate diagnosis given the abnormal presentations. BG control seem ok based on patient report, but no BG data to review. There is some concern for hypoglycemia given very tight control reported by patient, and would suspect that the lantus dose is the culprit. The patient is happy with control and reporting very rare true hypoglycemia.     -continue lantus 50u daily. Decrease to 45u daily if having more hypoglycemia, particularly in fasting state  -continue Tradjenta 5mg daily  -order placed for meds, estefanía needles and test strips, sent to [facility] pharmacy  -continue to check BG, agree with alternating test times, discussed appropriate times of fasting, pre meal, qhs or 2 hr post meal.   -check blood glucose at the time he is symptomatic for hypoglycemia. Correct the hypoglycemic episodes by having 10-15 g of easily absorbable carbohydrates like sweetened juice of preference or glucose tablets.      The option of considering a glucose sensor was discussed with the patient but he is not interested.    2. DM complications  Retinopathy - due for eye exam, referral placed  Nephropathy - + proteinuria, managed by nephrology given complicated case, starting on losartan  Neuropathy - + sx of parasthesias, + h/o gangrene associated with critical illness and pressor use, requried wound debridement but now healed. Normal foot exam 04/11/18   BP - elevated today, managed by nephrology with new addition of losartan, may need increase in losartan if tolerated vs increase in other medication, will defer to nephrology  CVD/Lipids - not on statin but LDL in 30s, will continue to monitor.    3.  Lowish calcium level on recent lab work  He was restarted on vitamin D at 3000 units daily, one month ago.  He reports drinking approximately 3 glasses of milk daily.  Prior DEXA scan from 2016 was normal and as per patient, he is scheduled to  have a follow-up DEXA scan through the transplant clinic.  The plan is to follow up the calcium level on future lab work.    Return to clinic in 4 months.    Lu Carrizales MD  Endocrinology Fellow, PGY4  [phone]    Patient was seen and discussed with attending Dr. West.     Test and/or medications prescribed today:  Orders Placed This Encounter   Procedures     Hemoglobin A1c     OPHTHALMOLOGY ADULT REFERRAL         Patient Instructions   Continue current regimen    Return for visit in 4 months    Will send new prescriptions for meds and test strips to your pharmacy.       I have personally examined the patient, reviewed and edited the fellow's note and agree with the plan of care.    Alisa West MD.

## 2018-04-11 NOTE — LETTER
4/11/2018       RE: Camacho Bhagat  [address] [facility] [id number]     Dear Colleague,    Thank you for referring your patient, Camacho Bhagat, to the [facility] ENDOCRINOLOGY at [facility]. Please see a copy of my visit note below.    Endocrinology Clinic Visit      April 11, 2018      Chief Complaint: DMII   Referred by: Dr. Camejo    Subjective:         HPI: Camacho Bhagat is a 52 year old year old male who is here for a clinic visit.   he  has a past medical history of Depressive disorder, not elsewhere classified; Diabetes type 2, controlled (H) (11/10/2016); Esophageal reflux; Fibromyalgia (1/2009); Gangrene of finger (H) (8/25/2017); H/O deep venous thrombosis (11/2001); H/O CASTAÑEDA (nonalcoholic steatohepatitis); H/O Pneumonia, organism unspecified(486) (10/2001); H/O: HTN (hypertension) (11/2001); History of hepatocellular carcinoma; History of liver transplant (H); History of obstructive sleep apnea; HLD (hyperlipidemia); Ischemia of both lower extremities (8/25/2017); Neutropenic colitis (H) (7/4/2017); Osteoarthritis; Presence of PERMANENT IVC filter; and Rheumatoid arthritis(714.0).    History of Diabetes  Type 2   This was diagnosed in 2002.  Oral meds first, later placed on insulin for the last 6-7 years.     Related history: h/o CASTAÑEDA cirrohcis +/- work exposures, and HCC s/p liver transplant 3/2017. Course complicated by acute abdomen/neutropenic fever requiring right hemicolectomy and colostomy Fall 2017. He later underwent colostomy takedown 1/5/18 which was complicated by abdominal wall abscess at a drain site. During this course, he has also developed excessive proteinuria which is followed by nephrology.     Current DM Regimen:  -lantus 50u qam  -trajdenta 5mg in AM    Related meds:  Prednisone started for transplant 2.5mg per day. Started spring 2018 as tacro had to be decreased due to SE of hyperkalemia.     Testing -   Once daily, varies times, either  pre meal or 1.5-2h post meal   Normal range is-   Highest he has seen on glucometer in last month was 140    Hypo -reports rare, 1-2x per month, wakes up in 70s couple times per week but does not consider this a low. Lows have previously occurred randomly, no clear pattern to patient, though it seems to happen if he skips a meal or eats low carb meal.   EMS called for BG 20 1 year ago, unclear if this was before or after transplant. Has glucagon at home.  Sx - feels drunk, sweaty, shakiness when BG gets below 70s.  Corrects the hypoglycemic episodes by having something to eat.  Recently, he has not been checking his blood glucose at the time he is symptomatic.    Previously followed by [facility], last visit 12/2017. Was on same meds at that time. Is transferring as his MD there is leaving the clinic.       Current Treatment  Diabetes Medication(s)     Dipeptidyl Peptidase-4 (DPP-4) Inhibitors Sig    linagliptin (TRADJENTA) 5 MG TABS tablet Take 5 mg by mouth daily     Insulin Sig    insulin glargine (LANTUS) 100 UNIT/ML injection Inject 50 Units Subcutaneous every morning            Meter Download Summary: did not bring meter      Diet: 2 meals per day  Breakfast: varies, example - english muffin, sausage egg/cheese buscuit   Lunch: skips   Dinner: rarely eats out,cooks at home, example - baked chicken, veggies, rice  Snacks: none  Alcohol or sugared beverages: none.     Exercise   Walks dog every day, 1-2 miles    Weight trend  Wt Readings from Last 4 Encounters:   04/11/18 93 kg (205 lb)   04/04/18 94.8 kg (209 lb)   03/28/18 92.1 kg (203 lb)   03/21/18 94 kg (207 lb 3.2 oz)         Complications    + chronic complications.     1. Retinopathy: No known eye disease  Last eye exam was on late 2016 . Due for repeat.     2.  Nephropathy: yes, though unclear if related to DM vs post transplant complications.  He has significant proteinuria and has been evaluated by nephrology.  Recent GFR in the  upper 50s.  He was started on 25 mg losartan on 4/4/18.  Albumin Urine mg/g Cr   Date Value Ref Range Status   10/27/2017 132.46 (H) 0 - 17 mg/g Cr Final     Medication (Note: This includes the hypertensive combination subclass to make sure to show all ACEI/ARB's.)     Angiotensin II Receptor Antagonists Sig    losartan (COZAAR) 25 MG tablet Take 1 tablet (25 mg) by mouth daily          3. Neuropathy - left foot - pins/needles sensation.   Last monofilament testing: today was normal.     4. Hypoglycemia: yes, rarely, see above    5. Macrovascular: none     6. Lipids: not on statin , last LDL in 30s      A1c today is   Lab Results   Component Value Date    A1C 5.1 01/06/2018    A1C  07/06/2017     Canceled, Test credited   Below Assay Range  NOTIFIED LEONARD ONEILL AT 0538 ON 7/6/17 BY EAANTOLIN      A1C 9.4 02/16/2017    A1C 6.2 12/14/2016    A1C 6.1 10/27/2016            Allergies   Allergen Reactions     Erythromycin GI Disturbance     Vioxx      Nausea, vomiting       Current Outpatient Prescriptions   Medication Sig Dispense Refill     amLODIPine (NORVASC) 10 MG tablet Take 1 tablet (10 mg) by mouth daily (Patient taking differently: Take 10 mg by mouth every morning ) 90 tablet 3     losartan (COZAAR) 25 MG tablet Take 1 tablet (25 mg) by mouth daily 30 tablet 11     furosemide (LASIX) 40 MG tablet Take 1 tablet (40 mg) by mouth 2 times daily 60 tablet 11     metoprolol succinate (TOPROL-XL) 50 MG 24 hr tablet Take 1 tablet (50 mg) by mouth daily 30 tablet 11     oxyCODONE IR (ROXICODONE) 10 MG tablet Take 0.5 tablets (5 mg) by mouth every 6 hours as needed for moderate to severe pain 15 tablet 0     tacrolimus (GENERIC EQUIVALENT) 1 MG capsule Take 1 capsule (1 mg) by mouth every 12 hours 60 capsule 11     predniSONE (DELTASONE) 2.5 MG tablet Take 1 tablet (2.5 mg) by mouth daily 90 tablet 3     cholecalciferol (VITAMIN D3) 1000 UNIT tablet Take 1 tablet (1,000 Units) by mouth daily 100 tablet 3     mycophenolic  acid (GENERIC EQUIVALENT) 360 MG EC tablet Take 2 tablets (720 mg) by mouth every 12 hours 120 tablet 3     CIALIS 5 MG tablet Take 5 mg by mouth as needed   1     OMEPRAZOLE PO Take 20 mg by mouth every morning        GABAPENTIN PO Take 300 mg by mouth 3 times daily       insulin glargine (LANTUS) 100 UNIT/ML injection Inject 50 Units Subcutaneous every morning       linagliptin (TRADJENTA) 5 MG TABS tablet Take 5 mg by mouth daily        cyclobenzaprine (FLEXERIL) 10 MG tablet Take 1 tablet (10 mg) by mouth 3 times daily as needed for muscle spasms 90 tablet 0       Review of Systems     Constitutional: Negative for fever and unexpected weight change. Appetite is good   Head: no headache   Eye: no vision change/ loss of peripheral vision.   ENT: No throat congestion.   Respiratory: no cough   Cardiovascular: no chest pain or tachycardia  Gastrointestinal: Negative for vomiting, abdominal pain and diarrhea.  Genitourinary: No bladder problems.  Musculoskeletal: Negative for myalgias. No weakness.  Neurological: Negative for seizures and headaches.  Psychiatric/Behavioral: Negative for behavioral problem and dysphoric mood.    Past Medical History:   Diagnosis Date     Depressive disorder, not elsewhere classified      Diabetes type 2, controlled (H) 11/10/2016     Esophageal reflux      Fibromyalgia 1/2009    dx with Dr Benitez( [facility])     Gangrene of finger (H) 8/25/2017     H/O deep venous thrombosis 11/2001    Permanent IVC filter in place.     H/O CASTAÑEDA (nonalcoholic steatohepatitis)      H/O Pneumonia, organism unspecified(486) 10/2001    Included ARDS, sepsis, and  acute renal failure; hospitalized, required tracheostomy placement.     H/O: HTN (hypertension) 11/2001    No longer prescribed antihypertensive medication.       History of hepatocellular carcinoma      History of liver transplant (H)      History of obstructive sleep apnea     No longer wears CPAP since losing approximately 200 pounds with his  liver transplant and its complications.       HLD (hyperlipidemia)      Ischemia of both lower extremities 8/25/2017    Distal ischemia due to shock/high pressor requirements     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     Osteoarthritis      Presence of PERMANENT IVC filter      Rheumatoid arthritis(714.0)        Past Surgical History:   Procedure Laterality Date     BENCH LIVER N/A 3/4/2017    Procedure: BENCH LIVER;  Surgeon: Jovan Tran MD;  Location: [facility]     C TOTAL KNEE ARTHROPLASTY  2008    Right knee arthroscopy     CHOLECYSTECTOMY       COLONOSCOPY N/A 7/21/2017    Procedure: COMBINED COLONOSCOPY, SINGLE OR MULTIPLE BIOPSY/POLYPECTOMY BY BIOPSY;  Colonoscopy;  Surgeon: Izaiah Montes MD;  [facility]     ENT SURGERY      Repair of deviated septum     ESOPHAGOSCOPY, GASTROSCOPY, DUODENOSCOPY (EGD), COMBINED N/A 8/4/2016    Procedure: COMBINED ESOPHAGOSCOPY, GASTROSCOPY, DUODENOSCOPY (EGD), BIOPSY SINGLE OR MULTIPLE;  Surgeon: Trent Pederson MD;  Location:  GI     ESOPHAGOSCOPY, GASTROSCOPY, DUODENOSCOPY (EGD), COMBINED N/A 8/27/2017    Procedure: COMBINED ESOPHAGOSCOPY, GASTROSCOPY, DUODENOSCOPY (EGD);;  Surgeon: Los Wynn MD;  Location: [facility]     INCISION AND DRAINAGE ABDOMEN WASHOUT, COMBINED Right 2/14/2018    Procedure: COMBINED INCISION AND DRAINAGE ABDOMEN WASHOUT;  COMBINED INCISION AND DRAINAGE right ABDOMEN flank;  Surgeon: Sara Dinh MD;  Location: [facility]     LAPAROTOMY EXPLORATORY N/A 7/4/2017    Procedure: LAPAROTOMY EXPLORATORY;  Exploratory Laparotomy, washout;  Surgeon: Tip Zhang MD;  Location: [facility]     LAPAROTOMY EXPLORATORY N/A 7/5/2017    Procedure: LAPAROTOMY EXPLORATORY;  Exploratory Laparotomy, Washout with closure.;  Surgeon: Tip Zhang MD;  [facility]: [facility]     LAPAROTOMY EXPLORATORY N/A 7/26/2017    Procedure: LAPAROTOMY EXPLORATORY;  Exploratory Laparotomy, Right angelique-colectomy, end ileostomy, mucosal fistula,  partial omentectomy;  Surgeon: Sara Dinh MD;  Location: [facility]     ORTHOPEDIC SURGERY Right     Repair of dislocated wrist.       RELEASE TRIGGER FINGER Right 11/10/2017    Procedure: RELEASE TRIGGER FINGER;  Debride, V-Y Flap Right Index Finger;  Surgeon: Momo Noonan MD;  [facility]: [facility]     TAKEDOWN ILEOSTOMY N/A 2018    Procedure: TAKEDOWN ILEOSTOMY;  Exploratory Laparotomy, Lysis of Adhesions, Takedown Of End Ileostomy, Takedown of mucocutaneous fistula, ileocolic resection  And Colorectal Anastomosis, Primary repair of Ventral Hernia, Anesthesia Block ;  Surgeon: Sara Dinh MD;  Location: [facility]     TRACHEOSTOMY  2001    During hospitalization for pneumonia.       TRANSPLANT LIVER RECIPIENT  DONOR N/A 3/4/2017    Procedure: TRANSPLANT LIVER RECIPIENT  DONOR;  Surgeon: Jovan Tran MD;  Location: [facility]       Family History   Problem Relation Age of Onset     DIABETES Father      Hypertension Father      Substance Abuse Father      Arthritis Mother      Thyroid Cancer Mother      Survivor!     Cervical Cancer Maternal Grandmother      CEREBROVASCULAR DISEASE Maternal Grandfather      Prostate Cancer Paternal Grandfather      Colon Cancer No family hx of      Hyperlipidemia No family hx of      Coronary Artery Disease No family hx of      Breast Cancer No family hx of          Social History     Social History     Marital status: ,      Spouse name: N/A     Number of children: 1     Years of education: N/A     Occupational History            Social History Main Topics     Smoking status: Former Smoker     Packs/day: 0.75     Years: 2.00     Quit date:      Smokeless tobacco: Former User     Types: Chew     Quit date: 10/8/2015     Alcohol use No      Comment: No alcohol since liver transplant.       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, not currently working         Lives alone    Objective:   /82  Pulse 83  Ht 1.829 m (6')  Wt 93 kg (205 lb)  BMI 27.8 kg/m2    General: well developed, well nourished, in NAD  Eyes: anicteric, normal extra-occular movements, no lid lag, no stare  HEENT: MMM, oropharynx clear, no LAD, no thyrmegaly   CV: RRR, normal S1/S2, no MRG  Pulm: CTAB, decreased breath sounds on right > left lung  Abd: soft, non tender, non distended, multiple healed surgical scars, no notable lipohypertrophy  Neuro: alert, CNII-XII intact, strength 5/5 in all 4 extremities, sensation intact to light touch  Extremities: no LE edema  Skin: No lesions noted, normal texture  Feet:2+ DP pulses, no wounds/lesions, monofilament intact throughout.   Musculoskeletal: Right index finger amputation, osteoarthritic changes of the fingers    In House Labs:   Lab Results   Component Value Date    A1C 5.1 01/06/2018    A1C  07/06/2017     Canceled, Test credited   Below Assay Range  NOTIFIED LEONARD ONEILL AT 0538 ON 7/6/17 BY EAB      A1C 9.4 02/16/2017    A1C 6.2 12/14/2016    A1C 6.1 10/27/2016       TSH   Date Value Ref Range Status   04/02/2018 2.74 0.40 - 4.00 mU/L Final   02/08/2016 3.35 0.40 - 4.00 mU/L Final   04/11/2011 2.77 0.4 - 5.0 mU/L Final   02/15/2010 2.53 0.4 - 5.0 mU/L Final   07/31/2009 1.95 0.4 - 5.0 mU/L Final     T4 Free   Date Value Ref Range Status   04/11/2011 1.23 0.70 - 1.85 ng/dL Final   02/15/2010 0.90 0.70 - 1.85 ng/dL Final       Creatinine   Date Value Ref Range Status   04/02/2018 1.31 (H) 0.66 - 1.25 mg/dL Final   ]    Recent Labs   Lab Test  04/02/18   1042  02/26/18   1111   07/03/12   0710   CHOL  97  117   < >  133   HDL  36*  30*   < >  35*   LDL  39  29   < >  62   TRIG  109  293*   < >  182*   CHOLHDLRATIO   --    --    --   3.8    < > = values in this interval not displayed.       No results found for:  [id number], [id number], [id number]      Assessment/Treatment Plan:      Camacho Bhagat is a 52 year old year old male with    1. Type 2 Diabetes: last A1c 5.5% today, though not entirely interpretable given h/o anemia.   There is some evidence of chronic diabetic complications, including left sided neuropathy and nephropathy, though both of these may be caused by alternate diagnosis given the abnormal presentations. BG control seem ok based on patient report, but no BG data to review. There is some concern for hypoglycemia given very tight control reported by patient, and would suspect that the lantus dose is the culprit. The patient is happy with control and reporting very rare true hypoglycemia.     -continue lantus 50u daily. Decrease to 45u daily if having more hypoglycemia, particularly in fasting state  -continue Tradjenta 5mg daily  -order placed for meds, estefanía needles and test strips, sent to [facility] pharmacy  -continue to check BG, agree with alternating test times, discussed appropriate times of fasting, pre meal, qhs or 2 hr post meal.   -check blood glucose at the time he is symptomatic for hypoglycemia. Correct the hypoglycemic episodes by having 10-15 g of easily absorbable carbohydrates like sweetened juice of preference or glucose tablets.      The option of considering a glucose sensor was discussed with the patient but he is not interested.    2. DM complications  Retinopathy - due for eye exam, referral placed  Nephropathy - + proteinuria, managed by nephrology given complicated case, starting on losartan  Neuropathy - + sx of parasthesias, + h/o gangrene associated with critical illness and pressor use, requried wound debridement but now healed. Normal foot exam 04/11/18   BP - elevated today, managed by nephrology with new addition of losartan, may need increase in losartan if tolerated vs increase in other medication, will defer to nephrology  CVD/Lipids - not on statin but LDL in  30s, will continue to monitor.    3.  Lowish calcium level on recent lab work  He was restarted on vitamin D at 3000 units daily, one month ago.  He reports drinking approximately 3 glasses of milk daily.  Prior DEXA scan from 2016 was normal and as per patient, he is scheduled to have a follow-up DEXA scan through the transplant clinic.  The plan is to follow up the calcium level on future lab work.    Return to clinic in 4 months.    Lu Carrizales MD  Endocrinology Fellow, PGY4  [phone]    Patient was seen and discussed with attending Dr. West.     Test and/or medications prescribed today:  Orders Placed This Encounter   Procedures     Hemoglobin A1c     OPHTHALMOLOGY ADULT REFERRAL         Patient Instructions   Continue current regimen    Return for visit in 4 months    Will send new prescriptions for meds and test strips to your pharmacy.       I have personally examined the patient, reviewed and edited the fellow's note and agree with the plan of care.    Alisa West MD.

## 2018-04-11 NOTE — NURSING NOTE
"Patient stated all medications were \" the same \" from his 's appointment last week and declined to go through medications with me.      Chief Complaint   Patient presents with     Consult     HYPOKALEMIA AND TESTICULAR HYPOFUNCTION CONSULTATION        Initial BP (!) 173/91 (BP Location: Right arm)  Ht 1.829 m (6')  Wt 93 kg (205 lb)  BMI 27.8 kg/m2 Estimated body mass index is 27.8 kg/(m^2) as calculated from the following:    Height as of this encounter: 1.829 m (6').    Weight as of this encounter: 93 kg (205 lb).  Medication Reconciliation: incomplete      Nicole Medellin, DINORA     " Detail Level: Detailed

## 2018-04-11 NOTE — MR AVS SNAPSHOT
After Visit Summary   4/11/2018    Camacho Bhagat    MRN: [id number]           Patient Information     Date Of Birth          1965        Visit Information        Provider Department      4/11/2018 1:00 PM Alisa West MD [facility]        Today's Diagnoses     Type 2 diabetes mellitus with diabetic polyneuropathy, with long-term current use of insulin (H)    -  1      Care Instructions    Continue current regimen    Return for visit in 4 months    Will send new prescriptions for meds and test strips to your pharmacy.           Follow-ups after your visit        Follow-up notes from your care team     Return in about 4 months (around 8/11/2018) for DMII.      Your next 10 appointments already scheduled     Jun 06, 2018 12:45 PM CDT   (Arrive by 12:30 PM)   Return Liver Transplant with Toñito Camejo MD   [facility] ([facility])    [address] [place] [address]  [address] [place] [address] [id number] [phone]            Aug 15, 2018 11:10 AM CDT   (Arrive by 10:40 AM)   Return Visit with Jael Rubio MD   [facility] Neph[facility] ([facility])    [address] [place] [address]  [address] [place] [address] [id number] [phone]            Aug 20, 2018  9:30 AM CDT   (Arrive by 9:15 AM)   RETURN ENDOCRINE with Alisa West MD   [facility] ([facility])    [address] [place] [address]  3rd [facility] [id number] [phone]              Future tests that were ordered for you today     Open Future Orders        Priority Expected Expires Ordered    Hemoglobin A1c Routine  4/11/2019 4/11/2018            Who to contact     Please call your clinic at [phone] to:    Ask questions about your health    Make or cancel appointments    Discuss your medicines    Learn about your test results    Speak to your doctor            Additional Information About Your Visit        [employer]  Information     [employer] gives you secure access to your electronic health record. If you see a primary care provider, you can also send messages to your care team and make appointments. If you have questions, please call your primary care clinic.  If you do not have a primary care provider, please call [phone] and they will assist you.      [employer] is an electronic gateway that provides easy, online access to your medical records. With [employer], you can request a clinic appointment, read your test results, renew a prescription or communicate with your care team.     To access your existing account, please contact your [facility] or call [phone] for assistance.        Care EveryWhere ID     This is your Care EveryWhere ID. This could be used by other organizations to access your [place] medical records  [id number]        Your Vitals Were     Pulse Height BMI (Body Mass Index)             83 1.829 m (6') 27.8 kg/m2          Blood Pressure from Last 3 Encounters:   04/11/18 155/82   04/04/18 136/75   03/28/18 153/87    Weight from Last 3 Encounters:   04/11/18 93 kg (205 lb)   04/04/18 94.8 kg (209 lb)   03/28/18 92.1 kg (203 lb)                 Today's Medication Changes          These changes are accurate as of 4/11/18  2:13 PM.  If you have any questions, ask your nurse or doctor.               These medicines have changed or have updated prescriptions.        Dose/Directions    amLODIPine 10 MG tablet   Commonly known as:  NORVASC   This may have changed:  when to take this   Used for:  HTN (hypertension)        Dose:  10 mg   Take 1 tablet (10 mg) by mouth daily   Quantity:  90 tablet   Refills:  3                Primary Care Provider Office Phone # Fax #    Shay Kirkpatrick [phone]       [facility] [address] [place] [id number]        Equal Access to Services     FRANKLIN PORTILLO AH: troy Ospina, danielle caro  devi dolanelena de santiagoaan ah. So [facility] [phone].    ATENCIÓN: Si magenla radha, tiene a zheng disposición servicios gratuitos de asistencia lingüística. Shahla al [phone].    We comply with applicable federal civil rights laws and [place] laws. We do not discriminate on the basis of race, color, national origin, age, disability, sex, sexual orientation, or gender identity.            Thank you!     Thank you for choosing [facility] END[facility]  for your care. Our goal is always to provide you with excellent care. Hearing back from our patients is one way we can continue to improve our services. Please take a few minutes to complete the written survey that you may receive in the mail after your visit with us. Thank you!             Your Updated Medication List - Protect others around you: Learn how to safely use, store and throw away your medicines at www.disposemymeds.org.          This list is accurate as of 4/11/18  2:13 PM.  Always use your most recent med list.                   Brand Name Dispense Instructions for use Diagnosis    amLODIPine 10 MG tablet    NORVASC    90 tablet    Take 1 tablet (10 mg) by mouth daily    HTN (hypertension)       cholecalciferol 1000 UNIT tablet    vitamin D3    100 tablet    Take 1 tablet (1,000 Units) by mouth daily    Vitamin D deficiency       CIALIS 5 MG tablet   Generic drug:  tadalafil      Take 5 mg by mouth as needed        cyclobenzaprine 10 MG tablet    FLEXERIL    90 tablet    Take 1 tablet (10 mg) by mouth 3 times daily as needed for muscle spasms    Immunosuppression (H)       furosemide 40 MG tablet    LASIX    60 tablet    Take 1 tablet (40 mg) by mouth 2 times daily    Hyperkalemia, Persistent proteinuria, Benign essential hypertension       GABAPENTIN PO      Take 300 mg by mouth 3 times daily        insulin glargine 100 UNIT/ML injection    LANTUS     Inject 50 Units Subcutaneous every morning        linagliptin 5 MG Tabs tablet     TRADJENTA     Take 5 mg by mouth daily        losartan 25 MG tablet    COZAAR    30 tablet    Take 1 tablet (25 mg) by mouth daily    Persistent proteinuria, Hyperkalemia, Benign essential hypertension       metoprolol succinate 50 MG 24 hr tablet    TOPROL-XL    30 tablet    Take 1 tablet (50 mg) by mouth daily    Benign essential hypertension, Persistent proteinuria, Hyperkalemia       mycophenolic acid 360 MG EC tablet    GENERIC EQUIVALENT    120 tablet    Take 2 tablets (720 mg) by mouth every 12 hours    History of liver transplant (H), Long-term use of immunosuppressant medication       OMEPRAZOLE PO      Take 20 mg by mouth every morning        oxyCODONE IR 10 MG tablet    ROXICODONE    15 tablet    Take 0.5 tablets (5 mg) by mouth every 6 hours as needed for moderate to severe pain    Intra-abdominal infection, Abdominal abscess (H)       predniSONE 2.5 MG tablet    DELTASONE    90 tablet    Take 1 tablet (2.5 mg) by mouth daily    Liver replaced by transplant (H), Long-term use of immunosuppressant medication       tacrolimus 1 MG capsule    GENERIC EQUIVALENT    60 capsule    Take 1 capsule (1 mg) by mouth every 12 hours    Liver transplant recipient (H)

## 2018-04-11 NOTE — PATIENT INSTRUCTIONS
Continue current regimen    Return for visit in 4 months    Will send new prescriptions for meds and test strips to your pharmacy.

## 2018-04-12 ENCOUNTER — CARE COORDINATION (OUTPATIENT)
Dept: NEPHROLOGY | Facility: CLINIC | Age: 53
End: 2018-04-12

## 2018-04-12 NOTE — PROGRESS NOTES
Left VM for patient letting him know that potassium is stable at 4.8 after 3 days of being on losartan. Plan to recheck again on Friday as previously discussed. Asked patient to call back if he has any questions/ concerns in the meantime.    Quang Conteh RN

## 2018-04-13 ENCOUNTER — CARE COORDINATION (OUTPATIENT)
Dept: NEPHROLOGY | Facility: CLINIC | Age: 53
End: 2018-04-13

## 2018-04-13 DIAGNOSIS — Z94.4 HISTORY OF LIVER TRANSPLANT (H): ICD-10-CM

## 2018-04-13 DIAGNOSIS — N18.30 CKD (CHRONIC KIDNEY DISEASE) STAGE 3, GFR 30-59 ML/MIN (H): ICD-10-CM

## 2018-04-13 DIAGNOSIS — Z79.60 LONG-TERM USE OF IMMUNOSUPPRESSANT MEDICATION: ICD-10-CM

## 2018-04-13 LAB
ALBUMIN SERPL-MCNC: 2.7 G/DL (ref 3.4–5)
ALP SERPL-CCNC: 632 U/L (ref 40–150)
ALT SERPL W P-5'-P-CCNC: 63 U/L (ref 0–70)
ANION GAP SERPL CALCULATED.3IONS-SCNC: 8 MMOL/L (ref 3–14)
AST SERPL W P-5'-P-CCNC: 58 U/L (ref 0–45)
BILIRUB DIRECT SERPL-MCNC: 0.3 MG/DL (ref 0–0.2)
BILIRUB SERPL-MCNC: 0.6 MG/DL (ref 0.2–1.3)
BUN SERPL-MCNC: 26 MG/DL (ref 7–30)
CALCIUM SERPL-MCNC: 8.1 MG/DL (ref 8.5–10.1)
CHLORIDE SERPL-SCNC: 105 MMOL/L (ref 94–109)
CO2 SERPL-SCNC: 23 MMOL/L (ref 20–32)
CREAT SERPL-MCNC: 1.02 MG/DL (ref 0.66–1.25)
ERYTHROCYTE [DISTWIDTH] IN BLOOD BY AUTOMATED COUNT: 15.7 % (ref 10–15)
GFR SERPL CREATININE-BSD FRML MDRD: 76 ML/MIN/1.7M2
GLUCOSE SERPL-MCNC: 258 MG/DL (ref 70–99)
HCT VFR BLD AUTO: 30.9 % (ref 40–53)
HGB BLD-MCNC: 9.9 G/DL (ref 13.3–17.7)
MAGNESIUM SERPL-MCNC: 2 MG/DL (ref 1.6–2.3)
MCH RBC QN AUTO: 29.8 PG (ref 26.5–33)
MCHC RBC AUTO-ENTMCNC: 32 G/DL (ref 31.5–36.5)
MCV RBC AUTO: 93 FL (ref 78–100)
PHOSPHATE SERPL-MCNC: 3.3 MG/DL (ref 2.5–4.5)
PLATELET # BLD AUTO: 105 10E9/L (ref 150–450)
POTASSIUM SERPL-SCNC: 4.8 MMOL/L (ref 3.4–5.3)
PROT SERPL-MCNC: 7.1 G/DL (ref 6.8–8.8)
RBC # BLD AUTO: 3.32 10E12/L (ref 4.4–5.9)
SODIUM SERPL-SCNC: 136 MMOL/L (ref 133–144)
TACROLIMUS BLD-MCNC: <3 UG/L (ref 5–15)
TME LAST DOSE: ABNORMAL H
WBC # BLD AUTO: 4.1 10E9/L (ref 4–11)

## 2018-04-13 NOTE — PROGRESS NOTES
Potassium and creatinine stable since starting losartan on Monday 4/9. Patient updated. He'll get labs checked again in about a week.     Quang Conteh RN

## 2018-04-17 ENCOUNTER — TELEPHONE (OUTPATIENT)
Dept: TRANSPLANT | Facility: CLINIC | Age: 53
End: 2018-04-17

## 2018-04-17 DIAGNOSIS — R94.5 NONSPECIFIC ABNORMAL RESULTS OF LIVER FUNCTION STUDY: ICD-10-CM

## 2018-04-17 DIAGNOSIS — Z94.4 HISTORY OF LIVER TRANSPLANT (H): Primary | ICD-10-CM

## 2018-04-17 DIAGNOSIS — Z79.60 LONG-TERM USE OF IMMUNOSUPPRESSANT MEDICATION: ICD-10-CM

## 2018-04-17 NOTE — TELEPHONE ENCOUNTER
Spoke with Camacho about recommendation that he have an MRCP to f/u on elevation in Alk Phos to 632.   Camacho had already seen the my chart message and looked up information about MRCP.  Plan for him to have done at clinic or hospital,  And  will call him to set up day and time.

## 2018-04-20 DIAGNOSIS — Z94.4 HISTORY OF LIVER TRANSPLANT (H): ICD-10-CM

## 2018-04-20 DIAGNOSIS — Z79.60 LONG-TERM USE OF IMMUNOSUPPRESSANT MEDICATION: ICD-10-CM

## 2018-04-20 DIAGNOSIS — N18.30 CKD (CHRONIC KIDNEY DISEASE) STAGE 3, GFR 30-59 ML/MIN (H): ICD-10-CM

## 2018-04-20 LAB
ALBUMIN SERPL-MCNC: 3.2 G/DL (ref 3.4–5)
ANION GAP SERPL CALCULATED.3IONS-SCNC: 7 MMOL/L (ref 3–14)
BUN SERPL-MCNC: 28 MG/DL (ref 7–30)
CALCIUM SERPL-MCNC: 9 MG/DL (ref 8.5–10.1)
CHLORIDE SERPL-SCNC: 106 MMOL/L (ref 94–109)
CO2 SERPL-SCNC: 25 MMOL/L (ref 20–32)
CREAT SERPL-MCNC: 1.05 MG/DL (ref 0.66–1.25)
GFR SERPL CREATININE-BSD FRML MDRD: 74 ML/MIN/1.7M2
GLUCOSE SERPL-MCNC: 188 MG/DL (ref 70–99)
PHOSPHATE SERPL-MCNC: 3.6 MG/DL (ref 2.5–4.5)
POTASSIUM SERPL-SCNC: 5.2 MMOL/L (ref 3.4–5.3)
SODIUM SERPL-SCNC: 137 MMOL/L (ref 133–144)

## 2018-04-23 ENCOUNTER — TELEPHONE (OUTPATIENT)
Dept: TRANSPLANT | Facility: CLINIC | Age: 53
End: 2018-04-23

## 2018-04-23 NOTE — TELEPHONE ENCOUNTER
Spoke with Camacho about doing hepatic panel.   He is doing his labs on Friday, could not get scheduled for MRCP until 4/30, stating imaging was booked out.

## 2018-04-27 ENCOUNTER — TELEPHONE (OUTPATIENT)
Dept: TRANSPLANT | Facility: CLINIC | Age: 53
End: 2018-04-27

## 2018-04-27 DIAGNOSIS — N18.30 CKD (CHRONIC KIDNEY DISEASE) STAGE 3, GFR 30-59 ML/MIN (H): ICD-10-CM

## 2018-04-27 DIAGNOSIS — Z94.4 HISTORY OF LIVER TRANSPLANT (H): ICD-10-CM

## 2018-04-27 DIAGNOSIS — Z79.60 LONG-TERM USE OF IMMUNOSUPPRESSANT MEDICATION: ICD-10-CM

## 2018-04-27 LAB
ALBUMIN SERPL-MCNC: 3 G/DL (ref 3.4–5)
ALP SERPL-CCNC: 616 U/L (ref 40–150)
ALT SERPL W P-5'-P-CCNC: 114 U/L (ref 0–70)
ANION GAP SERPL CALCULATED.3IONS-SCNC: 8 MMOL/L (ref 3–14)
AST SERPL W P-5'-P-CCNC: 104 U/L (ref 0–45)
BILIRUB DIRECT SERPL-MCNC: 0.3 MG/DL (ref 0–0.2)
BILIRUB SERPL-MCNC: 0.6 MG/DL (ref 0.2–1.3)
BUN SERPL-MCNC: 35 MG/DL (ref 7–30)
CALCIUM SERPL-MCNC: 8.4 MG/DL (ref 8.5–10.1)
CHLORIDE SERPL-SCNC: 105 MMOL/L (ref 94–109)
CO2 SERPL-SCNC: 24 MMOL/L (ref 20–32)
CREAT SERPL-MCNC: 1.2 MG/DL (ref 0.66–1.25)
ERYTHROCYTE [DISTWIDTH] IN BLOOD BY AUTOMATED COUNT: 16.3 % (ref 10–15)
GFR SERPL CREATININE-BSD FRML MDRD: 63 ML/MIN/1.7M2
GLUCOSE SERPL-MCNC: 300 MG/DL (ref 70–99)
HCT VFR BLD AUTO: 28.6 % (ref 40–53)
HGB BLD-MCNC: 9.4 G/DL (ref 13.3–17.7)
MAGNESIUM SERPL-MCNC: 2 MG/DL (ref 1.6–2.3)
MCH RBC QN AUTO: 30.4 PG (ref 26.5–33)
MCHC RBC AUTO-ENTMCNC: 32.9 G/DL (ref 31.5–36.5)
MCV RBC AUTO: 93 FL (ref 78–100)
PHOSPHATE SERPL-MCNC: 3.3 MG/DL (ref 2.5–4.5)
PLATELET # BLD AUTO: 65 10E9/L (ref 150–450)
POTASSIUM SERPL-SCNC: 4.9 MMOL/L (ref 3.4–5.3)
PROT SERPL-MCNC: 6.9 G/DL (ref 6.8–8.8)
RBC # BLD AUTO: 3.09 10E12/L (ref 4.4–5.9)
SODIUM SERPL-SCNC: 136 MMOL/L (ref 133–144)
TACROLIMUS BLD-MCNC: <3 UG/L (ref 5–15)
TME LAST DOSE: ABNORMAL H
WBC # BLD AUTO: 4.6 10E9/L (ref 4–11)

## 2018-04-27 NOTE — TELEPHONE ENCOUNTER
Reviewed labs and noted elevated LFTS.   Pt. Denies symptoms; says he is feeling fine.  Plan for MRCP on Monday.

## 2018-04-30 ENCOUNTER — HOSPITAL ENCOUNTER (OUTPATIENT)
Dept: MRI IMAGING | Facility: CLINIC | Age: 53
Discharge: HOME | End: 2018-04-30
Attending: INTERNAL MEDICINE | Admitting: INTERNAL MEDICINE
Payer: COMMERCIAL

## 2018-04-30 DIAGNOSIS — R94.5 NONSPECIFIC ABNORMAL RESULTS OF LIVER FUNCTION STUDY: ICD-10-CM

## 2018-04-30 DIAGNOSIS — Z94.4 HISTORY OF LIVER TRANSPLANT (H): ICD-10-CM

## 2018-04-30 DIAGNOSIS — Z79.60 LONG-TERM USE OF IMMUNOSUPPRESSANT MEDICATION: ICD-10-CM

## 2018-04-30 LAB — RADIOLOGIST FLAGS: ABNORMAL

## 2018-04-30 PROCEDURE — 25000128 H RX IP 250 OP 636: Performed by: INTERNAL MEDICINE

## 2018-04-30 PROCEDURE — A9585 GADOBUTROL INJECTION: HCPCS | Performed by: INTERNAL MEDICINE

## 2018-04-30 PROCEDURE — 74183 MRI ABD W/O CNTR FLWD CNTR: CPT

## 2018-04-30 PROCEDURE — 40000141 ZZH STATISTIC PERIPHERAL IV START W/O US GUIDANCE

## 2018-04-30 RX ORDER — GADOBUTROL 604.72 MG/ML
10 INJECTION INTRAVENOUS ONCE
Status: COMPLETED | OUTPATIENT
Start: 2018-04-30 | End: 2018-04-30

## 2018-04-30 RX ADMIN — GADOBUTROL 9 ML: 604.72 INJECTION INTRAVENOUS at 11:51

## 2018-05-03 ENCOUNTER — TELEPHONE (OUTPATIENT)
Dept: TRANSPLANT | Facility: CLINIC | Age: 53
End: 2018-05-03

## 2018-05-04 ENCOUNTER — CARE COORDINATION (OUTPATIENT)
Dept: GASTROENTEROLOGY | Facility: CLINIC | Age: 53
End: 2018-05-04

## 2018-05-04 ENCOUNTER — TELEPHONE (OUTPATIENT)
Dept: TRANSPLANT | Facility: CLINIC | Age: 53
End: 2018-05-04

## 2018-05-04 DIAGNOSIS — K83.09 CHOLANGITIS (H): Primary | ICD-10-CM

## 2018-05-04 NOTE — TELEPHONE ENCOUNTER
Spoke with Camacho regarding MRCP/review per  and , with recommendation that he have an ERCP.   Informed Camacho he will be contacted by the  to set up the appt for this procedure, that it would be done under anesthesia, he would need a .   Reviewed with Camacho that if symptoms worsen, fevers with abd pain increased, he will need to be seen sooner in the ER, and he does know how to contact the after hours on call coordinator.

## 2018-05-04 NOTE — PROGRESS NOTES
"Message to organize the following per Dr. Gaitan:  Lets get him in next week   Izabel/Maia; how can we best get them in   Thanks   Zay     Order placed for ERCP and sent to scheduling.       Patient called and is amenable to plan as noted and aware of the following:  Procedure was not explained to patient, he stated he knew the \"ins and outs of the procedure\" and did not want an explaination.   This is a more urgent procedure.   - states he cannot do this next week and the 10th and 15th is fully booked for him.   They can expect a call for date and time for procedure.   They will need a , someone to stay with them for 24 hours and to stay in town for 24 hours post procedure, rational explained.   He will need a pre-op physical appointment at [facility].     Blood thinner - None  ASA - none   Diabetic - he is diabetic and will need to have this reviewed by his MD for recommendations.   A pre-op nurse will call 1-2 days prior to the procedure.     This RN was unable to get through the following with him as he told this RN to just have the  call him and then disconnected.   Is advised to be NPO/solid food at midnight before the procedure in the event the procedure time is moved up. Ok to drink clear liquids up to 4 hours prior to procedure.     Advised post procedure to start with clear liquids and advance diet slowly as tolerated.  It is normal to have a sore throat after the procedure.   May also have some muscle tenderness from positioning on the table.     Aware this RN will call within 2-3 days post procedure to see how they are doing.    Verbalized understanding of all instructions. All questions answered.   Contact information verified for future questions/concerns.     Izabel STOVER, RN Coordinator  Dr. Tracey, Dr. Gaitan & Dr. Quinones   [facility]  [phone]            "

## 2018-05-07 DIAGNOSIS — Z94.4 HISTORY OF LIVER TRANSPLANT (H): ICD-10-CM

## 2018-05-07 DIAGNOSIS — Z79.60 LONG-TERM USE OF IMMUNOSUPPRESSANT MEDICATION: Primary | ICD-10-CM

## 2018-05-07 NOTE — TELEPHONE ENCOUNTER
Pt called back. He needs a refill on omeprazole 20 mg bid. Reports he called the pharmacy and they hadn't sent the request yet.

## 2018-05-07 NOTE — TELEPHONE ENCOUNTER
Spoke with Camacho about MRCP done on Monday 4/30, that the results are being reviewed by , plan for f/u should be forth coming, and will recontact him as soon as review is completed.

## 2018-05-07 NOTE — TELEPHONE ENCOUNTER
Rec'd message from admin that pt was on the phone calling about a refill. Admin reports pt was upset and short. He hung up the phone while call was being transferred. Writer returned call, pt did not answer. LM requesting return phone call.

## 2018-05-10 ENCOUNTER — TELEPHONE (OUTPATIENT)
Dept: TRANSPLANT | Facility: CLINIC | Age: 53
End: 2018-05-10

## 2018-05-10 ENCOUNTER — TELEPHONE (OUTPATIENT)
Dept: GASTROENTEROLOGY | Facility: CLINIC | Age: 53
End: 2018-05-10

## 2018-05-10 NOTE — TELEPHONE ENCOUNTER
Per message from pt, waiting to hear about scheduling for ERCP,  Telephone message sent to Lauren about scheduling for ERCP, that pt will have an appt in the [facility] clinic for H & P, that transplant scheduling will call him to make this appt for him.

## 2018-05-10 NOTE — Clinical Note
Madisyn, can you get him into the Pre anesthesia clinic for an H and P , hopefully early next week.   He needs to have an ERCP, and required to have a history/physical within the month of having the ERCP. tks  I have sent him a my chart message about it.

## 2018-05-10 NOTE — TELEPHONE ENCOUNTER
VM left for patient to check his Mychart with the reasoning for my call.  Direct line also left in VM in the event pt does not have access to Mychart.    SR 05/10/2018 @ 401p

## 2018-05-11 DIAGNOSIS — N18.30 CKD (CHRONIC KIDNEY DISEASE) STAGE 3, GFR 30-59 ML/MIN (H): ICD-10-CM

## 2018-05-11 DIAGNOSIS — N18.4 CHRONIC KIDNEY DISEASE, STAGE 4 (SEVERE) (H): ICD-10-CM

## 2018-05-11 DIAGNOSIS — Z94.4 HISTORY OF LIVER TRANSPLANT (H): ICD-10-CM

## 2018-05-11 DIAGNOSIS — Z79.60 LONG-TERM USE OF IMMUNOSUPPRESSANT MEDICATION: ICD-10-CM

## 2018-05-11 LAB
ALBUMIN SERPL-MCNC: 3 G/DL (ref 3.4–5)
ALP SERPL-CCNC: 706 U/L (ref 40–150)
ALT SERPL W P-5'-P-CCNC: 99 U/L (ref 0–70)
ANION GAP SERPL CALCULATED.3IONS-SCNC: 6 MMOL/L (ref 3–14)
AST SERPL W P-5'-P-CCNC: 89 U/L (ref 0–45)
BILIRUB DIRECT SERPL-MCNC: 0.4 MG/DL (ref 0–0.2)
BILIRUB SERPL-MCNC: 0.8 MG/DL (ref 0.2–1.3)
BUN SERPL-MCNC: 26 MG/DL (ref 7–30)
CALCIUM SERPL-MCNC: 8.5 MG/DL (ref 8.5–10.1)
CHLORIDE SERPL-SCNC: 105 MMOL/L (ref 94–109)
CO2 SERPL-SCNC: 24 MMOL/L (ref 20–32)
CREAT SERPL-MCNC: 1.17 MG/DL (ref 0.66–1.25)
ERYTHROCYTE [DISTWIDTH] IN BLOOD BY AUTOMATED COUNT: 15.9 % (ref 10–15)
GFR SERPL CREATININE-BSD FRML MDRD: 65 ML/MIN/1.7M2
GLUCOSE SERPL-MCNC: 190 MG/DL (ref 70–99)
HCT VFR BLD AUTO: 31.2 % (ref 40–53)
HGB BLD-MCNC: 10.1 G/DL (ref 13.3–17.7)
MAGNESIUM SERPL-MCNC: 2 MG/DL (ref 1.6–2.3)
MCH RBC QN AUTO: 31.1 PG (ref 26.5–33)
MCHC RBC AUTO-ENTMCNC: 32.4 G/DL (ref 31.5–36.5)
MCV RBC AUTO: 96 FL (ref 78–100)
PHOSPHATE SERPL-MCNC: 3.3 MG/DL (ref 2.5–4.5)
PLATELET # BLD AUTO: 71 10E9/L (ref 150–450)
POTASSIUM SERPL-SCNC: 5 MMOL/L (ref 3.4–5.3)
PROT SERPL-MCNC: 7.2 G/DL (ref 6.8–8.8)
RBC # BLD AUTO: 3.25 10E12/L (ref 4.4–5.9)
SODIUM SERPL-SCNC: 136 MMOL/L (ref 133–144)
TACROLIMUS BLD-MCNC: <3 UG/L (ref 5–15)
TME LAST DOSE: ABNORMAL H
WBC # BLD AUTO: 5.3 10E9/L (ref 4–11)

## 2018-05-14 RX ORDER — TADALAFIL 5 MG
TABLET ORAL
Qty: 30 TABLET | Refills: 0 | OUTPATIENT
Start: 2018-05-14

## 2018-05-18 ENCOUNTER — ALLIED HEALTH/NURSE VISIT (OUTPATIENT)
Dept: SURGERY | Facility: CLINIC | Age: 53
End: 2018-05-18
Payer: COMMERCIAL

## 2018-05-18 ENCOUNTER — OFFICE VISIT (OUTPATIENT)
Dept: SURGERY | Facility: CLINIC | Age: 53
End: 2018-05-18
Payer: COMMERCIAL

## 2018-05-18 ENCOUNTER — APPOINTMENT (OUTPATIENT)
Dept: SURGERY | Facility: CLINIC | Age: 53
End: 2018-05-18
Payer: COMMERCIAL

## 2018-05-18 ENCOUNTER — ANESTHESIA EVENT (OUTPATIENT)
Dept: SURGERY | Facility: CLINIC | Age: 53
End: 2018-05-18
Payer: COMMERCIAL

## 2018-05-18 VITALS
DIASTOLIC BLOOD PRESSURE: 82 MMHG | OXYGEN SATURATION: 100 % | WEIGHT: 217.1 LBS | BODY MASS INDEX: 29.4 KG/M2 | HEIGHT: 72 IN | TEMPERATURE: 98 F | SYSTOLIC BLOOD PRESSURE: 171 MMHG | RESPIRATION RATE: 18 BRPM | HEART RATE: 76 BPM

## 2018-05-18 DIAGNOSIS — Z01.818 PREOP EXAMINATION: Primary | ICD-10-CM

## 2018-05-18 DIAGNOSIS — K83.09 CHOLANGITIS (H): ICD-10-CM

## 2018-05-18 RX ORDER — MULTIPLE VITAMINS W/ MINERALS TAB 9MG-400MCG
1 TAB ORAL EVERY MORNING
COMMUNITY
End: 2022-09-08

## 2018-05-18 ASSESSMENT — LIFESTYLE VARIABLES: TOBACCO_USE: 1

## 2018-05-18 NOTE — PATIENT INSTRUCTIONS
Preparing for Your Surgery      Name:  Camcaho Bhagat   MRN:  [id number]   :  1965   Today's Date:  2018     Arriving for surgery:  Surgery date:  18  Arrival time:  08:00 am  Please come to:       [facility] [address]  [address] [place]  [address]    -   parking is available in front of the hospital from 5:15 am to 8:00 pm    -  Stop at the Information Desk in the lobby    -   Inform the information person that you are here for surgery. An escort to 3c will be provided. If you would not like an escort, please proceed to 3C on the 3rd floor. [phone]     What can I eat or drink?  -  You may have solid food or milk products until 8 hours prior to your surgery.  -  You may have water, apple juice or 7up/Sprite until 2 hours prior to your surgery.    Which medicines can I take?  -  Do NOT take these medications in the morning, the day of surgery:  Stop vitamins and supplements one week prior to surgery.  Stop aspirin x 7 days before surgery, ibuprofen x 2 days before surgery, do not take trajenta and losartan the morning of surgery if normally taken in the morning, do not take cialis 24 hrs before surgery    -  Please take these medications the day of surgery:  Tylenol if needed, norvasc (amlodipine) and metoprolol, transplant medications - tacrolimus, mycophenolic acid, and prednisone, and prilosec if normally taken in the morning, and 40 units of lantus insulin the morning of surgery    How do I prepare myself?  -  Take two showers: one the night before surgery; and one the morning of surgery.         Use Scrubcare or Hibiclens to wash from neck down.  You may use your own     shampoo and conditioner. No other hair products.   -  Do NOT use lotion, powder, deodorant, or antiperspirant the day of your surgery.  -  Do NOT wear any makeup, fingernail polish or jewelry.  -    - Do not bring your own medications to the hospital, except for inhalers  and eye   drops.  -  Bring your ID and insurance card.    Questions or Concerns:  If you have questions or concerns regarding the day of surgery, please call: for [employer] [facility] call [phone], for the [facility] call [phone] or [phone].  After surgery please call your surgeons office.

## 2018-05-18 NOTE — ANESTHESIA PREPROCEDURE EVALUATION
Anesthesia Evaluation     . Pt has had prior anesthetic. Type: General and MAC    No history of anesthetic complications          ROS/MED HX    ENT/Pulmonary:     (+)sleep apnea, tobacco use, Past use 0.25 packs/day  doesn't use CPAP , . .    Neurologic:     (+)neuropathy - left foot,     Cardiovascular:     (+) hypertension----. : . . . :. . Previous cardiac testing Echodate:7/4/2017results:Echocardiogram  7/4/2017  Interpretation Summary  Global and regional left ventricular function is normal with an EF of 60-65%.  Mild right ventricular dilation is present.  Global right ventricular function is normal.  Right ventricular systolic pressure is 36mmHg above the right atrial pressure.  No pericardial effusion is present.     This study was compared with the study from 7/6/2017, no significant changes  are found.        _____________________________________________________________________________  __        Left Ventricle  Global and regional left ventricular function is normal with an EF of 60-65%.  No regional wall motion abnormalities are seen.Stress Testdate:2/8/2017 results:Interpretation Summary  Normal dobutamine stress echocardiogram without evidence of inducible  ischemia. Target heart rate was achieved. Heart rate and blood pressure  response to dobutamine were normal. With stress, the left ventricular ejection  fraction increased from 55-60% to greater than 65% and the left ventricular  size decreased appropriately.  No subjective symptoms to suggest ischemia.  There was no ECG evidence of ischemia.  No significant valve disease on screening doppler evaluation. The aortic root  and visualized ascending aorta are normal.  The right ventricular systolic pressure is borderline elevated and  approximated at 36mmHg plus the right atrial pressure, though the TR jet is  sub-optimal.ECG reviewed date:10/24/2017 results:Sinus rhythm  Left axis deviation  Abnormal ECG   date: results:          METS/Exercise  Tolerance:  >4 METS   Hematologic:     (+) History of blood clots pt is not anticoagulated, Anemia, History of Transfusion no previous transfusion reaction Other Hematologic Disorder-chronic thrombocytopenia      Musculoskeletal:   (+) arthritis, , , other musculoskeletal- chronic low back pain.  rheumatoid arthritis in remission.  reports multiple joint pain related to OA      GI/Hepatic:     (+) Other GI/Hepatic       Renal/Genitourinary:  - ROS Renal section negative       Endo:     (+) type II DM Last HgA1c: 5.5 date: 4/11/2018 Using insulin - not using insulin pump Normal glucose range:  not previously admitted for DM/DKA Diabetic complications: neuropathy, Chronic steroid usage for Post Transplant Immunosuppression Date most recently used: daily,.      Psychiatric:  - neg psychiatric ROS       Infectious Disease:  - neg infectious disease ROS       Malignancy:   (+) Malignancy History of Other  Other CA liver Remission status post Chemo and Surgery         Other:    (+) H/O Chronic Pain,                 Procedure: Procedure(s):  Endoscopic Retrograde Cholangiopancreatogram  - Wound Class:     HPI: Camacho Bhagat is a 52 year old male with the following history who presents for ERCP.    PMHx/PSHx:  Past Medical History:   Diagnosis Date     Depressive disorder, not elsewhere classified      Diabetes type 2, controlled (H) 11/10/2016     Esophageal reflux      Fibromyalgia 1/2009    dx with Dr Benitez( Rheum)     Gangrene of finger (H) 8/25/2017     H/O deep venous thrombosis 11/2001    Permanent IVC filter in place.     H/O CASTAÑEDA (nonalcoholic steatohepatitis)      H/O Pneumonia, organism unspecified(486) 10/2001    Included ARDS, sepsis, and  acute renal failure; hospitalized, required tracheostomy placement.     H/O: HTN (hypertension) 11/2001    No longer prescribed antihypertensive medication.       History of CVA (cerebrovascular accident)      History of hepatocellular carcinoma      History of liver  transplant (H)      History of obstructive sleep apnea     No longer wears CPAP since losing approximately 200 pounds with his liver transplant and its complications.       HLD (hyperlipidemia)      Ischemia of both lower extremities 8/25/2017    Distal ischemia due to shock/high pressor requirements     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     Osteoarthritis      Presence of PERMANENT IVC filter      Rheumatoid arthritis(714.0)        Past Surgical History:   Procedure Laterality Date     BENCH LIVER N/A 3/4/2017    Procedure: BENCH LIVER;  Surgeon: Jovan Tran MD;  Location:  OR      TOTAL KNEE ARTHROPLASTY  2008    Right knee arthroscopy     CHOLECYSTECTOMY       COLONOSCOPY N/A 7/21/2017    Procedure: COMBINED COLONOSCOPY, SINGLE OR MULTIPLE BIOPSY/POLYPECTOMY BY BIOPSY;  Colonoscopy;  Surgeon: Izaiah Montes MD;  Location:  GI     ENT SURGERY      Repair of deviated septum     ESOPHAGOSCOPY, GASTROSCOPY, DUODENOSCOPY (EGD), COMBINED N/A 8/4/2016    Procedure: COMBINED ESOPHAGOSCOPY, GASTROSCOPY, DUODENOSCOPY (EGD), BIOPSY SINGLE OR MULTIPLE;  Surgeon: Trent Pederson MD;  Location:  GI     ESOPHAGOSCOPY, GASTROSCOPY, DUODENOSCOPY (EGD), COMBINED N/A 8/27/2017    Procedure: COMBINED ESOPHAGOSCOPY, GASTROSCOPY, DUODENOSCOPY (EGD);;  Surgeon: Los Wynn MD;  Location:  GI     INCISION AND DRAINAGE ABDOMEN WASHOUT, COMBINED Right 2/14/2018    Procedure: COMBINED INCISION AND DRAINAGE ABDOMEN WASHOUT;  COMBINED INCISION AND DRAINAGE right ABDOMEN flank;  Surgeon: Sara Dinh MD;  Location: [facility] OR     LAPAROTOMY EXPLORATORY N/A 7/4/2017    Procedure: LAPAROTOMY EXPLORATORY;  Exploratory Laparotomy, washout;  Surgeon: Tip Zhang MD;  Location: [facility] OR     LAPAROTOMY EXPLORATORY N/A 7/5/2017    Procedure: LAPAROTOMY EXPLORATORY;  Exploratory Laparotomy, Washout with closure.;  Surgeon: Tip Zhang MD;  Location: [facility] OR     LAPAROTOMY EXPLORATORY N/A  2017    Procedure: LAPAROTOMY EXPLORATORY;  Exploratory Laparotomy, Right angelique-colectomy, end ileostomy, mucosal fistula, partial omentectomy;  Surgeon: Sara Dinh MD;  Location: [facility] OR     ORTHOPEDIC SURGERY Right     Repair of dislocated wrist.       RELEASE TRIGGER FINGER Right 11/10/2017    Procedure: RELEASE TRIGGER FINGER;  Debride, V-Y Flap Right Index Finger;  Surgeon: Momo Noonan MD;  Location: [facility]     TAKEDOWN ILEOSTOMY N/A 2018    Procedure: TAKEDOWN ILEOSTOMY;  Exploratory Laparotomy, Lysis of Adhesions, Takedown Of End Ileostomy, Takedown of mucocutaneous fistula, ileocolic resection  And Colorectal Anastomosis, Primary repair of Ventral Hernia, Anesthesia Block ;  Surgeon: Sara Dinh MD;  Location: [facility] OR     TRACHEOSTOMY  2001    During hospitalization for pneumonia.       TRANSPLANT LIVER RECIPIENT  DONOR N/A 3/4/2017    Procedure: TRANSPLANT LIVER RECIPIENT  DONOR;  Surgeon: Jovan Tran MD;  Location: [facility] OR         No current facility-administered medications on file prior to encounter.   Current Outpatient Prescriptions on File Prior to Encounter:  amLODIPine (NORVASC) 10 MG tablet Take 1 tablet (10 mg) by mouth daily (Patient taking differently: Take 10 mg by mouth every morning )   cholecalciferol (VITAMIN D3) 1000 UNIT tablet Take 1 tablet (1,000 Units) by mouth daily (Patient taking differently: Take 1,000 Units by mouth every morning )   CIALIS 5 MG tablet Take 5 mg by mouth as needed    cyclobenzaprine (FLEXERIL) 10 MG tablet Take 1 tablet (10 mg) by mouth 3 times daily as needed for muscle spasms (Patient taking differently: Take 10 mg by mouth as needed for muscle spasms )   furosemide (LASIX) 40 MG tablet Take 1 tablet (40 mg) by mouth 2 times daily   GABAPENTIN PO Take 300 mg by mouth 3 times daily   Glucose Blood (BLOOD GLUCOSE TEST STRIPS) STRP 1 strip by Lancet route 4 times daily   insulin glargine (LANTUS)  100 UNIT/ML injection Inject 50 Units Subcutaneous every morning   insulin pen needle (BD ENE U/F) 32G X 4 MM Use 1 pen needles daily or as directed.   linagliptin (TRADJENTA) 5 MG TABS tablet Take 1 tablet (5 mg) by mouth daily (Patient taking differently: Take 5 mg by mouth every morning )   losartan (COZAAR) 25 MG tablet Take 1 tablet (25 mg) by mouth daily (Patient taking differently: Take 25 mg by mouth every morning )   metoprolol succinate (TOPROL-XL) 50 MG 24 hr tablet Take 1 tablet (50 mg) by mouth daily (Patient taking differently: Take 50 mg by mouth every morning )   mycophenolic acid (GENERIC EQUIVALENT) 360 MG EC tablet Take 2 tablets (720 mg) by mouth every 12 hours (Patient taking differently: Take 720 mg by mouth 2 times daily )   omeprazole (PRILOSEC) 20 MG CR capsule Take 1 capsule (20 mg) by mouth 2 times daily   predniSONE (DELTASONE) 2.5 MG tablet Take 1 tablet (2.5 mg) by mouth daily (Patient taking differently: Take 2.5 mg by mouth every morning )   tacrolimus (GENERIC EQUIVALENT) 1 MG capsule Take 1 capsule (1 mg) by mouth every 12 hours (Patient taking differently: Take 1 mg by mouth 2 times daily )       Social Hx:   Social History   Substance Use Topics     Smoking status: Former Smoker     Packs/day: 0.25     Years: 2.00     Types: Cigarettes     Quit date: 1985     Smokeless tobacco: Former User     Types: Chew     Quit date: 10/8/2015     Alcohol use No      Comment: No alcohol since liver transplant.         Allergies:   Allergies   Allergen Reactions     Erythromycin GI Disturbance     Vioxx      Nausea, vomiting         NPO Status: Per ASA Guidelines    Labs:    Blood Bank:  Lab Results   Component Value Date    ABO O 02/14/2018    RH Pos 02/14/2018    AS Neg 02/14/2018     BMP:  Recent Labs   Lab Test  05/11/18   1114   NA  136   POTASSIUM  5.0   CHLORIDE  105   CO2  24   BUN  26   CR  1.17   GLC  190*   JACOB  8.5     CBC:   Recent Labs   Lab Test  05/11/18   1114   WBC  5.3   RBC   3.25*   HGB  10.1*   HCT  31.2*   MCV  96   MCH  31.1   MCHC  32.4   RDW  15.9*   PLT  71*     Coags:  Recent Labs   Lab Test  02/13/18   0523   10/25/17   0907   07/26/17 2017   INR  1.05   < >   --    < >  1.31*   PTT   --    --    --    --   39*   FIBR   --    --   427*   --   442*    < > = values in this interval not displayed.               Physical Exam  Normal systems: cardiovascular, pulmonary and dental    Airway   Mallampati: III  TM distance: >3 FB  Neck ROM: full    Dental     Cardiovascular   Rhythm and rate: regular and normal      Pulmonary    breath sounds clear to auscultation               PAC Discussion and Assessment    ASA Classification: 3  Case is suitable for: [place]  Anesthetic techniques and relevant risks discussed: GA  Invasive monitoring and risk discussed:   Types:   Possibility and Risk of blood transfusion discussed:   NPO instructions given:   Additional anesthetic preparation and risks discussed:   Needs early admission to pre-op area:   Other:     PAC Resident/NP Anesthesia Assessment:  Camacho Bhagat is a 52 year old male scheduled for an Endoscopic Retrograde Cholangiopancreatogram on 5/22/2018 by Dr. Gaitan in treatment of cholangitis.  PAC referral for risk assessment and optimization for anesthesia with comorbid conditions of: s/p liver transplant due to liver carcinoma and CASTAÑEDA, hypertension, sleep apnea, history of smoking, anemia, thrombocytopenia, history of DVT, rheumatoid arthritis, OA, low back pain and diabetes.    Pre-operative considerations:  1.  Cardiac:  Functional status- METS >4.   The patient reports that he walks his dog daily about 3 miles and he also swims for exercise.  He denies any exertional dyspnea.  Hypertension is managed with losartan, amlodipine and toprol XL - instructed to hold losartan the DOS.  There is mention of history of A-fib on this patient's chart.  He denies any history of A-fib and isn't on any anticoagulation.  Echocardiogram in  July 2017 showed an EF of 60-65% and normal wall motion.  He had a stress test in Feb 2017 that showed no evidence of ischemia.   Right heart cath prior to liver transplant did indicated mild pulmonary hypertension, but that likely has resolved since transplant.  Low risk surgery with 0.4% risk of major adverse cardiac event.   2.  Pulm:  Airway feasible.  The patient has a known history of sleep apnea and used to use a CPAP, but reports that he hasn't used CPAP in at least a year since losing a significant amount of weight.  He denies any current symptoms of sleep apnea, but hasn't been retested.  He quit smoking in 1985.    3.  GI:  Risk of PONV score = 1.  If > 2, anti-emetic intervention recommended.  He denies any history of GERD and reports that he is only taking omeprazole preventatively due to all of his transplant medications.  S/p liver transplant in March 2017 - continue all transplant medications with no interruption prior to surgery.    4. Endo:  He is on 2.5mg of prednisone daily.  Stress dose steroids as per anesthesia.  Diabetes is managed with lantus and tradjenta.  Hold Tradjenta DOS and take only 80% of lantus dose.  5. Neuro:  Chart mentions history of CVA in 2001 - the patient denies this history.  6. Heme:  He has a permanent IVC filter due to a history of a DVT.  He has chronic anemia and thrombocytopenia.  His Hgb is stable at 10.1 and his platelets are stable at 71.  VTE risk 1.8%  7. Musculoskeletal:  He reports being in remission from rheumatoid arthritis, but having multiple joint pain secondary to multiple joint OA.  He also reports chronic low back pain.  Consider cautious positioning.         Patient is optimized and is acceptable candidate for the proposed procedure.  No further diagnostic evaluation is needed.     Patient discussed with Dr. Acosta.     **For further details of assessment, testing, and physical exam please see H and P completed on same date.          Juanita Villasenor  ALMA, RN, APRN      Reviewed and Signed by PAC Mid-Level Provider/Resident  Mid-Level Provider/Resident: Juanita Villasenor DNP, RN, APRN  Date: 5/18/2018  Time: 1432    Attending Anesthesiologist Anesthesia Assessment:  52 year old for ERCP in treatment of cholangitis. Patient is S/P liver transplant (3/2017) for liver ca and CASTAÑEDA, liver function is reasonable now, but platelets are still at 71,000. No known cardiac disease other than mild pulmonary hypertension that was present prior to transplant. It may have resolved since.     IVC filter due to chronic DVTs.    Patient/case discussed with PILLO. No need to see patient. Patient is appropriate for the planned procedure without further work-up or medical management.      Reviewed and Signed by PAC Anesthesiologist  Anesthesiologist: joe  Date: 5/18/2018  Time:   Pass/Fail: Pass  Disposition:     PAC Pharmacist Assessment:        Pharmacist:   Date:   Time:      Anesthesia Plan      History & Physical Review      ASA Status:  3 .        Plan for General and ETT with Intravenous induction. Maintenance will be Inhalation.    PONV prophylaxis:  Ondansetron (or other 5HT-3) and Dexamethasone or Solumedrol       Postoperative Care  Postoperative pain management:  IV analgesics.      Consents                          .

## 2018-05-18 NOTE — H&P
Pre-Operative H & P     CC:  Preoperative exam to assess for increased cardiopulmonary risk while undergoing surgery and anesthesia.    Date of Encounter: 5/18/2018  Primary Care Physician:  Shay Kirkpatrick  Associated diagnosis:  Cholangitis      HPI  Camacho Bhagat is a 52 year old male who presents for pre-operative H & P in preparation for an Endoscopic Retrograde Cholangiopancreatogram with Dr. Gaitan on 5/22/2018 at [facility].     Camacho Bhagat is a 52 year old male with s/p liver transplant due to liver carcinoma and CASTAÑEDA, hypertension, sleep apnea, history of smoking, anemia, thrombocytopenia, history of DVT, rheumatoid arthritis, OA, low back pain and diabetes that has current cholangitis.  He recently had elevated liver enzymes and alkaline phosphatase so transplant ordered an MRCP for evaluation.  The MRCP showed cholangitis.  An ERCP was subsequently recommended for further evaluation.  The patient denies any acute abdominal symptoms.      History is obtained from the patient.     Past Medical History  Past Medical History:   Diagnosis Date     Depressive disorder, not elsewhere classified      Diabetes type 2, controlled (H) 11/10/2016     Esophageal reflux      Fibromyalgia 1/2009    dx with Dr Benitez( Rheum)     Gangrene of finger (H) 8/25/2017     H/O deep venous thrombosis 11/2001    Permanent IVC filter in place.     H/O CASTAÑEDA (nonalcoholic steatohepatitis)      H/O Pneumonia, organism unspecified(486) 10/2001    Included ARDS, sepsis, and  acute renal failure; hospitalized, required tracheostomy placement.     H/O: HTN (hypertension) 11/2001    No longer prescribed antihypertensive medication.       History of CVA (cerebrovascular accident)      History of hepatocellular carcinoma      History of liver transplant (H)      History of obstructive sleep apnea     No longer wears CPAP since losing approximately 200 pounds with his liver transplant and its  complications.       HLD (hyperlipidemia)      Ischemia of both lower extremities 8/25/2017    Distal ischemia due to shock/high pressor requirements     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     Osteoarthritis      Presence of PERMANENT IVC filter      Rheumatoid arthritis(714.0)        Past Surgical History  Past Surgical History:   Procedure Laterality Date     BENCH LIVER N/A 3/4/2017    Procedure: BENCH LIVER;  Surgeon: Jovan Tran MD;  Location: [facility] OR     C TOTAL KNEE ARTHROPLASTY  2008    Right knee arthroscopy     CHOLECYSTECTOMY       COLONOSCOPY N/A 7/21/2017    Procedure: COMBINED COLONOSCOPY, SINGLE OR MULTIPLE BIOPSY/POLYPECTOMY BY BIOPSY;  Colonoscopy;  Surgeon: Izaiah Montes MD;  Location:  GI     ENT SURGERY      Repair of deviated septum     ESOPHAGOSCOPY, GASTROSCOPY, DUODENOSCOPY (EGD), COMBINED N/A 8/4/2016    Procedure: COMBINED ESOPHAGOSCOPY, GASTROSCOPY, DUODENOSCOPY (EGD), BIOPSY SINGLE OR MULTIPLE;  Surgeon: Trent Pederson MD;  Location:  GI     ESOPHAGOSCOPY, GASTROSCOPY, DUODENOSCOPY (EGD), COMBINED N/A 8/27/2017    Procedure: COMBINED ESOPHAGOSCOPY, GASTROSCOPY, DUODENOSCOPY (EGD);;  Surgeon: Los Wynn MD;  Location: [facility] GI     INCISION AND DRAINAGE ABDOMEN WASHOUT, COMBINED Right 2/14/2018    Procedure: COMBINED INCISION AND DRAINAGE ABDOMEN WASHOUT;  COMBINED INCISION AND DRAINAGE right ABDOMEN flank;  Surgeon: Sara Dinh MD;  Location: [facility] OR     LAPAROTOMY EXPLORATORY N/A 7/4/2017    Procedure: LAPAROTOMY EXPLORATORY;  Exploratory Laparotomy, washout;  Surgeon: Tip Zhang MD;  Location: [facility] OR     LAPAROTOMY EXPLORATORY N/A 7/5/2017    Procedure: LAPAROTOMY EXPLORATORY;  Exploratory Laparotomy, Washout with closure.;  Surgeon: Tip Zhang MD;  Location: [facility] OR     LAPAROTOMY EXPLORATORY N/A 7/26/2017    Procedure: LAPAROTOMY EXPLORATORY;  Exploratory Laparotomy, Right angelique-colectomy, end ileostomy, mucosal fistula,  partial omentectomy;  Surgeon: Sara Dinh MD;  Location: [facility] OR     ORTHOPEDIC SURGERY Right     Repair of dislocated wrist.       RELEASE TRIGGER FINGER Right 11/10/2017    Procedure: RELEASE TRIGGER FINGER;  Debride, V-Y Flap Right Index Finger;  Surgeon: Momo Noonan MD;  Location: [facility]     TAKEDOWN ILEOSTOMY N/A 2018    Procedure: TAKEDOWN ILEOSTOMY;  Exploratory Laparotomy, Lysis of Adhesions, Takedown Of End Ileostomy, Takedown of mucocutaneous fistula, ileocolic resection  And Colorectal Anastomosis, Primary repair of Ventral Hernia, Anesthesia Block ;  Surgeon: Sara Dinh MD;  Location: [facility] OR     TRACHEOSTOMY  2001    During hospitalization for pneumonia.       TRANSPLANT LIVER RECIPIENT  DONOR N/A 3/4/2017    Procedure: TRANSPLANT LIVER RECIPIENT  DONOR;  Surgeon: Jovan Tran MD;  Location: [facility] OR       Hx of Blood transfusions/reactions: yes, no reactions     Hx of abnormal bleeding or anti-platelet use: none        Steroid use in the last year: on 2.5mg prednisone daily long term    Personal or FH with difficulty with Anesthesia:  none    Prior to Admission Medications  Current Outpatient Prescriptions   Medication Sig Dispense Refill     amLODIPine (NORVASC) 10 MG tablet Take 1 tablet (10 mg) by mouth daily (Patient taking differently: Take 10 mg by mouth every morning ) 90 tablet 3     cholecalciferol (VITAMIN D3) 1000 UNIT tablet Take 1 tablet (1,000 Units) by mouth daily (Patient taking differently: Take 1,000 Units by mouth every morning ) 100 tablet 3     cyclobenzaprine (FLEXERIL) 10 MG tablet Take 1 tablet (10 mg) by mouth 3 times daily as needed for muscle spasms (Patient taking differently: Take 10 mg by mouth as needed for muscle spasms ) 90 tablet 0     furosemide (LASIX) 40 MG tablet Take 1 tablet (40 mg) by mouth 2 times daily 60 tablet 11     GABAPENTIN PO Take 300 mg by mouth 3 times daily       insulin glargine  (LANTUS) 100 UNIT/ML injection Inject 50 Units Subcutaneous every morning 18 mL 11     linagliptin (TRADJENTA) 5 MG TABS tablet Take 1 tablet (5 mg) by mouth daily (Patient taking differently: Take 5 mg by mouth every morning ) 90 tablet 3     losartan (COZAAR) 25 MG tablet Take 1 tablet (25 mg) by mouth daily (Patient taking differently: Take 25 mg by mouth every morning ) 30 tablet 11     metoprolol succinate (TOPROL-XL) 50 MG 24 hr tablet Take 1 tablet (50 mg) by mouth daily (Patient taking differently: Take 50 mg by mouth every morning ) 30 tablet 11     multivitamin, therapeutic with minerals (MULTI-VITAMIN) TABS tablet Take 1 tablet by mouth every morning       mycophenolic acid (GENERIC EQUIVALENT) 360 MG EC tablet Take 2 tablets (720 mg) by mouth every 12 hours (Patient taking differently: Take 720 mg by mouth 2 times daily ) 120 tablet 3     omeprazole (PRILOSEC) 20 MG CR capsule Take 1 capsule (20 mg) by mouth 2 times daily 60 capsule 11     predniSONE (DELTASONE) 2.5 MG tablet Take 1 tablet (2.5 mg) by mouth daily (Patient taking differently: Take 2.5 mg by mouth every morning ) 90 tablet 3     tacrolimus (GENERIC EQUIVALENT) 1 MG capsule Take 1 capsule (1 mg) by mouth every 12 hours (Patient taking differently: Take 1 mg by mouth 2 times daily ) 60 capsule 11     CIALIS 5 MG tablet Take 5 mg by mouth as needed   1     Glucose Blood (BLOOD GLUCOSE TEST STRIPS) STRP 1 strip by Lancet route 4 times daily 100 each 11     insulin pen needle (BD ENE U/F) 32G X 4 MM Use 1 pen needles daily or as directed. 100 each 3       Allergies  Allergies   Allergen Reactions     Erythromycin GI Disturbance     Vioxx      Nausea, vomiting       Social History  Social History     Social History     Marital status:      Spouse name: N/A     Number of children: 1     Years of education: N/A     Occupational History            Social History Main Topics     Smoking status: Former Smoker     Packs/day:  0.25     Years: 2.00     Types: Cigarettes     Quit date: 1985     Smokeless tobacco: Former User     Types: Chew     Quit date: 10/8/2015     Alcohol use No      Comment: No alcohol since liver transplant.       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     Cervical Cancer Maternal Grandmother      CEREBROVASCULAR DISEASE Maternal Grandfather      No Known Problems Paternal Grandmother      Prostate Cancer Paternal Grandfather      Colon Cancer No family hx of      Hyperlipidemia No family hx of      Coronary Artery Disease No family hx of      Breast Cancer No family hx of              ROS/MED HX  The complete review of systems is negative other than noted in the HPI or here.     ENT/Pulmonary:     (+)sleep apnea, tobacco use, Past use 0.25 packs/day  doesn't use CPAP , . .    Neurologic:     (+)neuropathy - left foot,     Cardiovascular:     (+) hypertension----. : . . . :. . Previous cardiac testing       METS/Exercise Tolerance:  >4 METS   Hematologic:     (+) History of blood clots pt is not anticoagulated, Anemia, History of Transfusion no previous transfusion reaction Other Hematologic Disorder-chronic thrombocytopenia      Musculoskeletal:   (+) arthritis, , , other musculoskeletal- chronic low back pain.  rheumatoid arthritis in remission.  reports multiple joint pain related to OA      GI/Hepatic:     (+) Other GI/Hepatic       Renal/Genitourinary:  - ROS Renal section negative       Endo:     (+) type II DM Last HgA1c: 5.5 date: 4/11/2018 Using insulin - not using insulin pump Normal glucose range:  not previously admitted for  "DM/DKA Diabetic complications: neuropathy, Chronic steroid usage for Post Transplant Immunosuppression Date most recently used: daily,.      Psychiatric:  - neg psychiatric ROS       Infectious Disease:  - neg infectious disease ROS       Malignancy:   (+) Malignancy History of Other  Other CA liver Remission status post Chemo and Surgery         Other:    (+) H/O Chronic Pain,               Temp: 98  F (36.7  C) Temp src: Oral BP: 171/82 Pulse: 76   Resp: 18 SpO2: 100 %         217 lbs 1.6 oz  6' 0\"   Body mass index is 29.44 kg/(m^2).       Physical Exam  Constitutional: Awake, alert, cooperative, no apparent distress, and appears stated age.  Eyes: Pupils equal, round and reactive to light, extra ocular muscles intact, sclera clear, conjunctiva normal.  HENT: Normocephalic, oral pharynx with moist mucus membranes, good dentition. No goiter appreciated.   Respiratory: Clear to auscultation bilaterally, no crackles or wheezing.  Cardiovascular: Regular rate and rhythm, normal S1 and S2, and no murmur noted.  Carotids +2, no bruits. No edema. Palpable pulses to radial  DP and PT arteries.   GI: Normal bowel sounds, soft, non-distended, non-tender, no masses palpated, no hepatosplenomegaly.  Surgical scars: midline abdomen and chevron abdomen  Lymph/Hematologic: No cervical lymphadenopathy and no supraclavicular lymphadenopathy.  Genitourinary: deferred  Skin: Warm and dry.    Musculoskeletal: Full ROM of neck. There is no redness, warmth, or swelling of the exposed joints. Gross motor strength is normal.    Neurologic: Awake, alert, oriented to name, place and time. Cranial nerves II-XII are grossly intact. Gait is normal.   Neuropsychiatric: Calm, cooperative. Normal affect.     Labs: (personally reviewed)    Component      Latest Ref Rng & Units 4/11/2018 5/11/2018   Sodium      133 - 144 mmol/L  136   Potassium      3.4 - 5.3 mmol/L  5.0   Chloride      94 - 109 mmol/L  105   Carbon Dioxide      20 - 32 mmol/L  " 24   Anion Gap      3 - 14 mmol/L  6   Glucose      70 - 99 mg/dL  190 (H)   Urea Nitrogen      7 - 30 mg/dL  26   Creatinine      0.66 - 1.25 mg/dL  1.17   GFR Estimate      >60 mL/min/1.7m2  65   GFR Estimate If Black      >60 mL/min/1.7m2  79   Calcium      8.5 - 10.1 mg/dL  8.5   Phosphorus      2.5 - 4.5 mg/dL  3.3   Albumin      3.4 - 5.0 g/dL  3.0 (L)   WBC      4.0 - 11.0 10e9/L  5.3   RBC Count      4.4 - 5.9 10e12/L  3.25 (L)   Hemoglobin      13.3 - 17.7 g/dL  10.1 (L)   Hematocrit      40.0 - 53.0 %  31.2 (L)   MCV      78 - 100 fl  96   MCH      26.5 - 33.0 pg  31.1   MCHC      31.5 - 36.5 g/dL  32.4   RDW      10.0 - 15.0 %  15.9 (H)   Platelet Count      150 - 450 10e9/L  71 (L)   Hemoglobin A1C      0 - 6.4 % 5.5      Echocardiogram  7/4/2017  Interpretation Summary  Global and regional left ventricular function is normal with an EF of 60-65%.  Mild right ventricular dilation is present.  Global right ventricular function is normal.  Right ventricular systolic pressure is 36mmHg above the right atrial pressure.  No pericardial effusion is present.     This study was compared with the study from 7/6/2017, no significant changes  are found.        _____________________________________________________________________________  __        Left Ventricle  Global and regional left ventricular function is normal with an EF of 60-65%.  No regional wall motion abnormalities are seen.        EKG  10/24/2017  Sinus rhythm  Left axis deviation  Abnormal ECG      Stress test  2/8/2017  Interpretation Summary  Normal dobutamine stress echocardiogram without evidence of inducible  ischemia. Target heart rate was achieved. Heart rate and blood pressure  response to dobutamine were normal. With stress, the left ventricular ejection  fraction increased from 55-60% to greater than 65% and the left ventricular  size decreased appropriately.  No subjective symptoms to suggest ischemia.  There was no ECG evidence of  ischemia.  No significant valve disease on screening doppler evaluation. The aortic root  and visualized ascending aorta are normal.  The right ventricular systolic pressure is borderline elevated and  approximated at 36mmHg plus the right atrial pressure, though the TR jet is  sub-optimal.            ASSESSMENT and PLAN  Camacho Bhagat is a 52 year old male scheduled for an Endoscopic Retrograde Cholangiopancreatogram on 5/22/2018 by Dr. Gaitan in treatment of cholangitis.  PAC referral for risk assessment and optimization for anesthesia with comorbid conditions of: s/p liver transplant due to liver carcinoma and CASTAÑEDA, hypertension, sleep apnea, history of smoking, anemia, thrombocytopenia, history of DVT, rheumatoid arthritis, OA, low back pain and diabetes.    Pre-operative considerations:  1.  Cardiac:  Functional status- METS >4.   The patient reports that he walks his dog daily about 3 miles and he also swims for exercise.  He denies any exertional dyspnea.  Hypertension is managed with losartan, amlodipine and toprol XL - instructed to hold losartan the DOS.  There is mention of history of A-fib on this patient's chart.  He denies any history of A-fib and isn't on any anticoagulation.  Echocardiogram in July 2017 showed an EF of 60-65% and normal wall motion.  He had a stress test in Feb 2017 that showed no evidence of ischemia.   Right heart cath prior to liver transplant did indicated mild pulmonary hypertension, but that likely has resolved since transplant.  Low risk surgery with 0.4% risk of major adverse cardiac event.   2.  Pulm:  Airway feasible.  The patient has a known history of sleep apnea and used to use a CPAP, but reports that he hasn't used CPAP in at least a year since losing a significant amount of weight.  He denies any current symptoms of sleep apnea, but hasn't been retested.  He quit smoking in 1985.    3.  GI:  Risk of PONV score = 1.  If > 2, anti-emetic intervention recommended.  He  denies any history of GERD and reports that he is only taking omeprazole preventatively due to all of his transplant medications.  S/p liver transplant in March 2017 - continue all transplant medications with no interruption prior to surgery.    4. Endo:  He is on 2.5mg of prednisone daily.  Stress dose steroids as per anesthesia.  Diabetes is managed with lantus and tradjenta.  Hold Tradjenta DOS and take only 80% of lantus dose.  5. Neuro:  Chart mentions history of CVA in 2001 - the patient denies this history.  6. Heme:  He has a permanent IVC filter due to a history of a DVT.  He has chronic anemia and thrombocytopenia.  His Hgb is stable at 10.1 and his platelets are stable at 71.  VTE risk = 1.8%  7. Musculoskeletal:  He reports being in remission from rheumatoid arthritis, but having multiple joint pain secondary to multiple joint OA.  He also reports chronic low back pain.  Consider cautious positioning.       Patient is optimized and is acceptable candidate for the proposed procedure.  No further diagnostic evaluation is needed.     Patient discussed with Dr. Acosta.             Juanita Villasenor DNP, RN, APRN  Preoperative Assessment Center  [facility]  Phone: [phone]  Fax: [phone]

## 2018-05-18 NOTE — MR AVS SNAPSHOT
After Visit Summary   2018    Camacho Bhagat    MRN: [id number]           Patient Information     Date Of Birth          1965        Visit Information        Provider Department      2018 2:00 PM Rn, [facility]        Care Instructions    Preparing for Your Surgery      Name:  Camacho Bhagat   MRN:  [id number]   :  1965   Today's Date:  2018     Arriving for surgery:  Surgery date:  18  Arrival time:  08:00 am  Please come to:       [facility] [address]  [address] [place]  [address]    -   parking is available in front of the hospital from 5:15 am to 8:00 pm    -  Stop at the Information Desk in the lobby    -   Inform the information person that you are here for surgery. An escort to 3c will be provided. If you would not like an escort, please proceed to 3C on the 3rd floor. [phone]     What can I eat or drink?  -  You may have solid food or milk products until 8 hours prior to your surgery.  -  You may have water, apple juice or 7up/Sprite until 2 hours prior to your surgery.    Which medicines can I take?  -  Do NOT take these medications in the morning, the day of surgery:  Stop vitamins and supplements one week prior to surgery.  Stop aspirin x 7 days before surgery, ibuprofen x 2 days before surgery, do not take trajenta and losartan the morning of surgery if normally taken in the morning, do not take cialis 24 hrs before surgery    -  Please take these medications the day of surgery:  Tylenol if needed, norvasc (amlodipine) and metoprolol, transplant medications - tacrolimus, mycophenolic acid, and prednisone, and prilosec if normally taken in the morning, and 40 units of lantus insulin the morning of surgery    How do I prepare myself?  -  Take two showers: one the night before surgery; and one the morning of surgery.         Use Scrubcare or Hibiclens to wash from neck  down.  You may use your own     shampoo and conditioner. No other hair products.   -  Do NOT use lotion, powder, deodorant, or antiperspirant the day of your surgery.  -  Do NOT wear any makeup, fingernail polish or jewelry.  -    - Do not bring your own medications to the hospital, except for inhalers and eye   drops.  -  Bring your ID and insurance card.    Questions or Concerns:  If you have questions or concerns regarding the day of surgery, please call: for [facility] call [phone], for the [facility] call [phone] or [phone].  After surgery please call your surgeons office.                     Follow-ups after your visit        Your next 10 appointments already scheduled     May 18, 2018  2:00 PM CDT   (Arrive by 1:45 PM)   PAC RN ASSESSMENT with  Pac Rn   [facility] ([facility])    [address] [place] [address]  [address] [facility] [address]   [phone]            May 18, 2018  2:30 PM CDT   (Arrive by 2:15 PM)   PAC Anesthesia Consult with  Pac Anesthesiologist   [facility] Preoperative [facility] [place] ([facility])    [address] [place] [address]  [address] [facility] [address]   [phone]            May 22, 2018   Procedure with Zay Gaitan MD   [facility] [place], Same Day Surgery (--)    [address] [facility] [address]   [phone]            Jun 06, 2018 12:45 PM CDT   (Arrive by 12:30 PM)   Return Liver Transplant with Toñito Camejo MD   [facility] Hepatology ([facility])    [address] [place] [address]  [address]  [facility] [address]   [phone]            Aug 15, 2018 11:10 AM CDT   (Arrive by 10:40 AM)   Return Visit with Jael Rubio MD   [facility] Nephrology ([facility])    [address] [place] [address]  [address]  [facility] [address]   [phone]            Aug 20, 2018  9:30 AM CDT   (Arrive by 9:15 AM)    RETURN ENDOCRINE with Alisa West MD   [facility] Endocrinology ([facility])    [address] [address] [phone]              Who to contact     Please call your clinic at [phone] to:    Ask questions about your health    Make or cancel appointments    Discuss your medicines    Learn about your test results    Speak to your doctor            Additional Information About Your Visit        [employer] Information     [employer] gives you secure access to your electronic health record. If you see a primary care provider, you can also send messages to your care team and make appointments. If you have questions, please call your primary care clinic.  If you do not have a primary care provider, please call [phone] and they will assist you.      [employer] is an electronic gateway that provides easy, online access to your medical records. With [employer], you can request a clinic appointment, read your test results, renew a prescription or communicate with your care team.     To access your existing account, please contact your [facility] or call [phone] for assistance.        Care EveryWhere ID     This is your Care EveryWhere ID. This could be used by other organizations to access your [place] medical records  [id number]         Blood Pressure from Last 3 Encounters:   05/18/18 171/82   04/11/18 155/82   04/04/18 136/75    Weight from Last 3 Encounters:   05/18/18 98.5 kg (217 lb 1.6 oz)   04/11/18 93 kg (205 lb)   04/04/18 94.8 kg (209 lb)              Today, you had the following     No orders found for display         Today's Medication Changes          These changes are accurate as of 5/18/18  1:37 PM.  If you have any questions, ask your nurse or doctor.               These medicines have changed or have updated prescriptions.        Dose/Directions    amLODIPine 10 MG tablet   Commonly known as:   NORVASC   This may have changed:  when to take this   Used for:  HTN (hypertension)        Dose:  10 mg   Take 1 tablet (10 mg) by mouth daily   Quantity:  90 tablet   Refills:  3       cholecalciferol 1000 UNIT tablet   Commonly known as:  vitamin D3   This may have changed:  when to take this   Used for:  Vitamin D deficiency        Dose:  1000 Units   Take 1 tablet (1,000 Units) by mouth daily   Quantity:  100 tablet   Refills:  3       cyclobenzaprine 10 MG tablet   Commonly known as:  FLEXERIL   This may have changed:  when to take this   Used for:  Immunosuppression (H)        Dose:  10 mg   Take 1 tablet (10 mg) by mouth 3 times daily as needed for muscle spasms   Quantity:  90 tablet   Refills:  0       linagliptin 5 MG Tabs tablet   Commonly known as:  TRADJENTA   This may have changed:  when to take this   Used for:  Type 2 diabetes mellitus with diabetic polyneuropathy, with long-term current use of insulin (H)        Dose:  5 mg   Take 1 tablet (5 mg) by mouth daily   Quantity:  90 tablet   Refills:  3       losartan 25 MG tablet   Commonly known as:  COZAAR   This may have changed:  when to take this   Used for:  Persistent proteinuria, Hyperkalemia, Benign essential hypertension        Dose:  25 mg   Take 1 tablet (25 mg) by mouth daily   Quantity:  30 tablet   Refills:  11       metoprolol succinate 50 MG 24 hr tablet   Commonly known as:  TOPROL-XL   This may have changed:  when to take this   Used for:  Benign essential hypertension, Persistent proteinuria, Hyperkalemia        Dose:  50 mg   Take 1 tablet (50 mg) by mouth daily   Quantity:  30 tablet   Refills:  11       mycophenolic acid 360 MG EC tablet   Commonly known as:  GENERIC EQUIVALENT   This may have changed:  when to take this   Used for:  History of liver transplant (H), Long-term use of immunosuppressant medication        Dose:  720 mg   Take 2 tablets (720 mg) by mouth every 12 hours   Quantity:  120 tablet   Refills:  3        predniSONE 2.5 MG tablet   Commonly known as:  DELTASONE   This may have changed:  when to take this   Used for:  Liver replaced by transplant (H), Long-term use of immunosuppressant medication        Dose:  2.5 mg   Take 1 tablet (2.5 mg) by mouth daily   Quantity:  90 tablet   Refills:  3       tacrolimus 1 MG capsule   Commonly known as:  GENERIC EQUIVALENT   This may have changed:  when to take this   Used for:  Liver transplant recipient (H)        Dose:  1 mg   Take 1 capsule (1 mg) by mouth every 12 hours   Quantity:  60 capsule   Refills:  11                Primary Care Provider Office Phone # Fax #    Shay Kirkpatrick [phone]       [address] [facility] [address]  [facility] [address]        Equal Access to Services     FRANKLIN PORTILLO : Todd Carlisle, wabrody gaston, qadany kaalmamachelle dolan, devi duckworth. So [facility] [phone].    ATENCIÓN: Si habla español, tiene a zheng disposición servicios gratuitos de asistencia lingüística. [facility] [phone].    We comply with applicable federal civil rights laws and [place] laws. We do not discriminate on the basis of race, color, national origin, age, disability, sex, sexual orientation, or gender identity.            Thank you!     Thank you for choosing [facility]  for your care. Our goal is always to provide you with excellent care. Hearing back from our patients is one way we can continue to improve our services. Please take a few minutes to complete the written survey that you may receive in the mail after your visit with us. Thank you!             Your Updated Medication List - Protect others around you: Learn how to safely use, store and throw away your medicines at www.disposemymeds.org.          This list is accurate as of 5/18/18  1:37 PM.  Always use your most recent med list.                   Brand Name Dispense Instructions for use Diagnosis    amLODIPine 10 MG  tablet    NORVASC    90 tablet    Take 1 tablet (10 mg) by mouth daily    HTN (hypertension)       BLOOD GLUCOSE TEST STRIPS Strp     100 each    1 strip by Lancet route 4 times daily    Type 2 diabetes mellitus with diabetic polyneuropathy, with long-term current use of insulin (H)       cholecalciferol 1000 UNIT tablet    vitamin D3    100 tablet    Take 1 tablet (1,000 Units) by mouth daily    Vitamin D deficiency       CIALIS 5 MG tablet   Generic drug:  tadalafil      Take 5 mg by mouth as needed        cyclobenzaprine 10 MG tablet    FLEXERIL    90 tablet    Take 1 tablet (10 mg) by mouth 3 times daily as needed for muscle spasms    Immunosuppression (H)       furosemide 40 MG tablet    LASIX    60 tablet    Take 1 tablet (40 mg) by mouth 2 times daily    Hyperkalemia, Persistent proteinuria, Benign essential hypertension       GABAPENTIN PO      Take 300 mg by mouth 3 times daily        insulin glargine 100 UNIT/ML injection    LANTUS    18 mL    Inject 50 Units Subcutaneous every morning    Type 2 diabetes mellitus with diabetic polyneuropathy, with long-term current use of insulin (H)       insulin pen needle 32G X 4 MM    BD ENE U/F    100 each    Use 1 pen needles daily or as directed.    Type 2 diabetes mellitus with diabetic polyneuropathy, with long-term current use of insulin (H)       linagliptin 5 MG Tabs tablet    TRADJENTA    90 tablet    Take 1 tablet (5 mg) by mouth daily    Type 2 diabetes mellitus with diabetic polyneuropathy, with long-term current use of insulin (H)       losartan 25 MG tablet    COZAAR    30 tablet    Take 1 tablet (25 mg) by mouth daily    Persistent proteinuria, Hyperkalemia, Benign essential hypertension       metoprolol succinate 50 MG 24 hr tablet    TOPROL-XL    30 tablet    Take 1 tablet (50 mg) by mouth daily    Benign essential hypertension, Persistent proteinuria, Hyperkalemia       Multi-vitamin Tabs tablet      Take 1 tablet by mouth every morning         mycophenolic acid 360 MG EC tablet    GENERIC EQUIVALENT    120 tablet    Take 2 tablets (720 mg) by mouth every 12 hours    History of liver transplant (H), Long-term use of immunosuppressant medication       omeprazole 20 MG CR capsule    priLOSEC    60 capsule    Take 1 capsule (20 mg) by mouth 2 times daily    Long-term use of immunosuppressant medication, History of liver transplant (H)       predniSONE 2.5 MG tablet    DELTASONE    90 tablet    Take 1 tablet (2.5 mg) by mouth daily    Liver replaced by transplant (H), Long-term use of immunosuppressant medication       tacrolimus 1 MG capsule    GENERIC EQUIVALENT    60 capsule    Take 1 capsule (1 mg) by mouth every 12 hours    Liver transplant recipient (H)

## 2018-05-22 ENCOUNTER — APPOINTMENT (OUTPATIENT)
Dept: GENERAL RADIOLOGY | Facility: CLINIC | Age: 53
End: 2018-05-22
Attending: INTERNAL MEDICINE
Payer: COMMERCIAL

## 2018-05-22 ENCOUNTER — ANESTHESIA (OUTPATIENT)
Dept: SURGERY | Facility: CLINIC | Age: 53
End: 2018-05-22
Payer: COMMERCIAL

## 2018-05-22 ENCOUNTER — HOSPITAL ENCOUNTER (OUTPATIENT)
Facility: CLINIC | Age: 53
Discharge: HOME | End: 2018-05-22
Attending: INTERNAL MEDICINE | Admitting: INTERNAL MEDICINE
Payer: COMMERCIAL

## 2018-05-22 ENCOUNTER — SURGERY (OUTPATIENT)
Age: 53
End: 2018-05-22

## 2018-05-22 VITALS
WEIGHT: 216.27 LBS | OXYGEN SATURATION: 100 % | DIASTOLIC BLOOD PRESSURE: 82 MMHG | HEIGHT: 72 IN | BODY MASS INDEX: 29.29 KG/M2 | SYSTOLIC BLOOD PRESSURE: 134 MMHG | TEMPERATURE: 98 F | RESPIRATION RATE: 16 BRPM

## 2018-05-22 DIAGNOSIS — Z94.4 LIVER TRANSPLANT RECIPIENT (H): Primary | ICD-10-CM

## 2018-05-22 LAB
ALBUMIN SERPL-MCNC: 3.4 G/DL (ref 3.4–5)
ALP SERPL-CCNC: 701 U/L (ref 40–150)
ALT SERPL W P-5'-P-CCNC: 103 U/L (ref 0–70)
AMYLASE SERPL-CCNC: 70 U/L (ref 30–110)
ANION GAP SERPL CALCULATED.3IONS-SCNC: 10 MMOL/L (ref 3–14)
AST SERPL W P-5'-P-CCNC: 101 U/L (ref 0–45)
BILIRUB SERPL-MCNC: 0.8 MG/DL (ref 0.2–1.3)
BUN SERPL-MCNC: 30 MG/DL (ref 7–30)
CALCIUM SERPL-MCNC: 8.4 MG/DL (ref 8.5–10.1)
CHLORIDE SERPL-SCNC: 104 MMOL/L (ref 94–109)
CO2 SERPL-SCNC: 24 MMOL/L (ref 20–32)
CREAT SERPL-MCNC: 1.15 MG/DL (ref 0.66–1.25)
ERCP: NORMAL
ERYTHROCYTE [DISTWIDTH] IN BLOOD BY AUTOMATED COUNT: 16.1 % (ref 10–15)
GFR SERPL CREATININE-BSD FRML MDRD: 66 ML/MIN/1.7M2
GLUCOSE BLDC GLUCOMTR-MCNC: 121 MG/DL (ref 70–99)
GLUCOSE BLDC GLUCOMTR-MCNC: 150 MG/DL (ref 70–99)
GLUCOSE SERPL-MCNC: 152 MG/DL (ref 70–99)
HCT VFR BLD AUTO: 31.8 % (ref 40–53)
HGB BLD-MCNC: 10.5 G/DL (ref 13.3–17.7)
LIPASE SERPL-CCNC: 161 U/L (ref 73–393)
MCH RBC QN AUTO: 30.4 PG (ref 26.5–33)
MCHC RBC AUTO-ENTMCNC: 33 G/DL (ref 31.5–36.5)
MCV RBC AUTO: 92 FL (ref 78–100)
PLATELET # BLD AUTO: 68 10E9/L (ref 150–450)
POTASSIUM SERPL-SCNC: 4.4 MMOL/L (ref 3.4–5.3)
PROT SERPL-MCNC: 7.2 G/DL (ref 6.8–8.8)
RBC # BLD AUTO: 3.45 10E12/L (ref 4.4–5.9)
SODIUM SERPL-SCNC: 139 MMOL/L (ref 133–144)
WBC # BLD AUTO: 4.7 10E9/L (ref 4–11)

## 2018-05-22 PROCEDURE — 83690 ASSAY OF LIPASE: CPT | Performed by: INTERNAL MEDICINE

## 2018-05-22 PROCEDURE — 40000170 ZZH STATISTIC PRE-PROCEDURE ASSESSMENT II: Performed by: INTERNAL MEDICINE

## 2018-05-22 PROCEDURE — 25500064 ZZH RX 255 OP 636: Performed by: INTERNAL MEDICINE

## 2018-05-22 PROCEDURE — 80053 COMPREHEN METABOLIC PANEL: CPT | Performed by: INTERNAL MEDICINE

## 2018-05-22 PROCEDURE — 36000059 ZZH SURGERY LEVEL 3 EA 15 ADDTL MIN UMMC: Performed by: INTERNAL MEDICINE

## 2018-05-22 PROCEDURE — 85027 COMPLETE CBC AUTOMATED: CPT | Performed by: INTERNAL MEDICINE

## 2018-05-22 PROCEDURE — 82150 ASSAY OF AMYLASE: CPT | Performed by: INTERNAL MEDICINE

## 2018-05-22 PROCEDURE — 36592 COLLECT BLOOD FROM PICC: CPT | Performed by: INTERNAL MEDICINE

## 2018-05-22 PROCEDURE — 37000008 ZZH ANESTHESIA TECHNICAL FEE, 1ST 30 MIN: Performed by: INTERNAL MEDICINE

## 2018-05-22 PROCEDURE — 71000014 ZZH RECOVERY PHASE 1 LEVEL 2 FIRST HR: Performed by: INTERNAL MEDICINE

## 2018-05-22 PROCEDURE — 37000009 ZZH ANESTHESIA TECHNICAL FEE, EACH ADDTL 15 MIN: Performed by: INTERNAL MEDICINE

## 2018-05-22 PROCEDURE — 40000277 XR SURGERY CARM FLUORO LESS THAN 5 MIN W STILLS: Mod: TC

## 2018-05-22 PROCEDURE — 25000125 ZZHC RX 250: Performed by: STUDENT IN AN ORGANIZED HEALTH CARE EDUCATION/TRAINING PROGRAM

## 2018-05-22 PROCEDURE — C1877 STENT, NON-COAT/COV W/O DEL: HCPCS | Performed by: INTERNAL MEDICINE

## 2018-05-22 PROCEDURE — 25000128 H RX IP 250 OP 636: Performed by: STUDENT IN AN ORGANIZED HEALTH CARE EDUCATION/TRAINING PROGRAM

## 2018-05-22 PROCEDURE — 82962 GLUCOSE BLOOD TEST: CPT

## 2018-05-22 PROCEDURE — 25000566 ZZH SEVOFLURANE, EA 15 MIN: Performed by: INTERNAL MEDICINE

## 2018-05-22 PROCEDURE — 71000027 ZZH RECOVERY PHASE 2 EACH 15 MINS: Performed by: INTERNAL MEDICINE

## 2018-05-22 PROCEDURE — 25000125 ZZHC RX 250: Performed by: INTERNAL MEDICINE

## 2018-05-22 PROCEDURE — 36000061 ZZH SURGERY LEVEL 3 W FLUORO 1ST 30 MIN - UMMC: Performed by: INTERNAL MEDICINE

## 2018-05-22 PROCEDURE — C1769 GUIDE WIRE: HCPCS | Performed by: INTERNAL MEDICINE

## 2018-05-22 PROCEDURE — C9399 UNCLASSIFIED DRUGS OR BIOLOG: HCPCS | Performed by: STUDENT IN AN ORGANIZED HEALTH CARE EDUCATION/TRAINING PROGRAM

## 2018-05-22 PROCEDURE — 27210794 ZZH OR GENERAL SUPPLY STERILE: Performed by: INTERNAL MEDICINE

## 2018-05-22 DEVICE — STENT FREEMAN PANCREA FLEX 4FRX11CM W/O FLANGE SGL PIGTAIL: Type: IMPLANTABLE DEVICE | Site: PANCREATIC DUCT | Status: FUNCTIONAL

## 2018-05-22 RX ORDER — PROPOFOL 10 MG/ML
INJECTION, EMULSION INTRAVENOUS PRN
Status: DISCONTINUED | OUTPATIENT
Start: 2018-05-22 | End: 2018-05-22

## 2018-05-22 RX ORDER — SODIUM CHLORIDE, SODIUM LACTATE, POTASSIUM CHLORIDE, CALCIUM CHLORIDE 600; 310; 30; 20 MG/100ML; MG/100ML; MG/100ML; MG/100ML
INJECTION, SOLUTION INTRAVENOUS CONTINUOUS PRN
Status: DISCONTINUED | OUTPATIENT
Start: 2018-05-22 | End: 2018-05-22

## 2018-05-22 RX ORDER — CIPROFLOXACIN 2 MG/ML
INJECTION, SOLUTION INTRAVENOUS PRN
Status: DISCONTINUED | OUTPATIENT
Start: 2018-05-22 | End: 2018-05-22

## 2018-05-22 RX ORDER — NALOXONE HYDROCHLORIDE 0.4 MG/ML
.1-.4 INJECTION, SOLUTION INTRAMUSCULAR; INTRAVENOUS; SUBCUTANEOUS
Status: DISCONTINUED | OUTPATIENT
Start: 2018-05-22 | End: 2018-05-22 | Stop reason: HOSPADM

## 2018-05-22 RX ORDER — INDOMETHACIN 50 MG/1
100 SUPPOSITORY RECTAL
Status: DISCONTINUED | OUTPATIENT
Start: 2018-05-22 | End: 2018-05-22 | Stop reason: HOSPADM

## 2018-05-22 RX ORDER — IOPAMIDOL 510 MG/ML
INJECTION, SOLUTION INTRAVASCULAR PRN
Status: DISCONTINUED | OUTPATIENT
Start: 2018-05-22 | End: 2018-05-22 | Stop reason: HOSPADM

## 2018-05-22 RX ORDER — ONDANSETRON 2 MG/ML
4 INJECTION INTRAMUSCULAR; INTRAVENOUS EVERY 30 MIN PRN
Status: DISCONTINUED | OUTPATIENT
Start: 2018-05-22 | End: 2018-05-22 | Stop reason: HOSPADM

## 2018-05-22 RX ORDER — ACETAMINOPHEN 325 MG/1
975 TABLET ORAL ONCE
Status: DISCONTINUED | OUTPATIENT
Start: 2018-05-22 | End: 2018-05-22 | Stop reason: HOSPADM

## 2018-05-22 RX ORDER — EPHEDRINE SULFATE 50 MG/ML
INJECTION, SOLUTION INTRAMUSCULAR; INTRAVENOUS; SUBCUTANEOUS PRN
Status: DISCONTINUED | OUTPATIENT
Start: 2018-05-22 | End: 2018-05-22

## 2018-05-22 RX ORDER — SODIUM CHLORIDE, SODIUM LACTATE, POTASSIUM CHLORIDE, CALCIUM CHLORIDE 600; 310; 30; 20 MG/100ML; MG/100ML; MG/100ML; MG/100ML
INJECTION, SOLUTION INTRAVENOUS CONTINUOUS
Status: DISCONTINUED | OUTPATIENT
Start: 2018-05-22 | End: 2018-05-22 | Stop reason: HOSPADM

## 2018-05-22 RX ORDER — CIPROFLOXACIN 500 MG/1
500 TABLET, FILM COATED ORAL 2 TIMES DAILY
Qty: 10 TABLET | Refills: 0 | Status: SHIPPED | OUTPATIENT
Start: 2018-05-22 | End: 2018-05-27

## 2018-05-22 RX ORDER — LIDOCAINE HYDROCHLORIDE 20 MG/ML
INJECTION, SOLUTION INFILTRATION; PERINEURAL PRN
Status: DISCONTINUED | OUTPATIENT
Start: 2018-05-22 | End: 2018-05-22

## 2018-05-22 RX ORDER — FENTANYL CITRATE 50 UG/ML
INJECTION, SOLUTION INTRAMUSCULAR; INTRAVENOUS PRN
Status: DISCONTINUED | OUTPATIENT
Start: 2018-05-22 | End: 2018-05-22

## 2018-05-22 RX ORDER — LABETALOL HYDROCHLORIDE 5 MG/ML
5-10 INJECTION, SOLUTION INTRAVENOUS
Status: DISCONTINUED | OUTPATIENT
Start: 2018-05-22 | End: 2018-05-22 | Stop reason: HOSPADM

## 2018-05-22 RX ORDER — LIDOCAINE 40 MG/G
CREAM TOPICAL
Status: DISCONTINUED | OUTPATIENT
Start: 2018-05-22 | End: 2018-05-22 | Stop reason: HOSPADM

## 2018-05-22 RX ORDER — FENTANYL CITRATE 50 UG/ML
25-50 INJECTION, SOLUTION INTRAMUSCULAR; INTRAVENOUS
Status: DISCONTINUED | OUTPATIENT
Start: 2018-05-22 | End: 2018-05-22 | Stop reason: HOSPADM

## 2018-05-22 RX ORDER — ONDANSETRON 2 MG/ML
INJECTION INTRAMUSCULAR; INTRAVENOUS PRN
Status: DISCONTINUED | OUTPATIENT
Start: 2018-05-22 | End: 2018-05-22

## 2018-05-22 RX ORDER — FLUMAZENIL 0.1 MG/ML
0.2 INJECTION, SOLUTION INTRAVENOUS
Status: DISCONTINUED | OUTPATIENT
Start: 2018-05-22 | End: 2018-05-22 | Stop reason: HOSPADM

## 2018-05-22 RX ORDER — ONDANSETRON 4 MG/1
4 TABLET, ORALLY DISINTEGRATING ORAL EVERY 30 MIN PRN
Status: DISCONTINUED | OUTPATIENT
Start: 2018-05-22 | End: 2018-05-22 | Stop reason: HOSPADM

## 2018-05-22 RX ADMIN — Medication 100 MG: at 10:43

## 2018-05-22 RX ADMIN — Medication 5 MG: at 11:25

## 2018-05-22 RX ADMIN — SODIUM CHLORIDE, POTASSIUM CHLORIDE, SODIUM LACTATE AND CALCIUM CHLORIDE: 600; 310; 30; 20 INJECTION, SOLUTION INTRAVENOUS at 10:35

## 2018-05-22 RX ADMIN — SUGAMMADEX 180 MG: 100 INJECTION, SOLUTION INTRAVENOUS at 11:47

## 2018-05-22 RX ADMIN — CIPROFLOXACIN 400 MG: 2 INJECTION INTRAVENOUS at 11:00

## 2018-05-22 RX ADMIN — Medication 5 MG: at 11:32

## 2018-05-22 RX ADMIN — SIMETHICONE 2 ML: 63.3; 3.7 SOLUTION/ DROPS ORAL at 11:25

## 2018-05-22 RX ADMIN — FENTANYL CITRATE 75 MCG: 50 INJECTION, SOLUTION INTRAMUSCULAR; INTRAVENOUS at 10:43

## 2018-05-22 RX ADMIN — ONDANSETRON 4 MG: 2 INJECTION INTRAMUSCULAR; INTRAVENOUS at 11:30

## 2018-05-22 RX ADMIN — PROPOFOL 150 MG: 10 INJECTION, EMULSION INTRAVENOUS at 10:43

## 2018-05-22 RX ADMIN — ROCURONIUM BROMIDE 20 MG: 10 INJECTION INTRAVENOUS at 10:53

## 2018-05-22 RX ADMIN — IOPAMIDOL 30 ML: 510 INJECTION, SOLUTION INTRAVASCULAR at 11:25

## 2018-05-22 RX ADMIN — LIDOCAINE HYDROCHLORIDE 95 MG: 20 INJECTION, SOLUTION INFILTRATION; PERINEURAL at 10:53

## 2018-05-22 RX ADMIN — Medication 5 MG: at 11:28

## 2018-05-22 RX ADMIN — FENTANYL CITRATE 25 MCG: 50 INJECTION, SOLUTION INTRAMUSCULAR; INTRAVENOUS at 10:35

## 2018-05-22 NOTE — IP AVS SNAPSHOT
Post Anesthesia Care Unit [address] [address]    Phone:  [phone]                                       After Visit Summary   5/22/2018    Camacho Bhagat    MRN: [id number]           After Visit Summary Signature Page     I have received my discharge instructions, and my questions have been answered. I have discussed any challenges I see with this plan with the nurse or doctor.    ..........................................................................................................................................  Patient/Patient Representative Signature      ..........................................................................................................................................  Patient Representative Print Name and Relationship to Patient    ..................................................               ................................................  Date                                            Time    ..........................................................................................................................................  Reviewed by Signature/Title    ...................................................              ..............................................  Date                                                            Time

## 2018-05-22 NOTE — OP NOTE
ERCP 05/22/2018 10:39 AM [facility], [address] [place] [address] [phone]     Endoscopy Department   _______________________________________________________________________________   Patient Name: Camacho Bhagat             Procedure Date: 5/22/2018 10:39 AM   MRN: [id number]                       Account Number: [id number]   YOB: 1965              Admit Type: Outpatient   Age: 52                               Room: OR   Gender: Male                          Note Status: Finalized   Attending MD: Zay Gaitan MD   Total Sedation Time:   _______________________________________________________________________________       Procedure:           ERCP   Indications:         Abnormal MRCP, Elevated liver enzymes, Post liver                        transplant assessment   Providers:           Zay Gaitan MD   Patient Profile:     Mr Bhagat is a 51yo gentleman status post duct to duct                        liver transplant for [place]/HCC who was found to have                        asymptomatic liver study elevation prompting MRCP                        suggesting evidence of cholangitis. He now proceeds to                        evaluation and management by ERCP.   Referring MD:        Toñito Camejo MD   Requesting Provider: Jovan Tran MD   Medicines:           General Anesthesia, Indomethacin not given due to                        contraindication, Cipro 400 mg IV   Complications:       No immediate complications.   _______________________________________________________________________________   Procedure:           Pre-Anesthesia Assessment:                        - Prior to the procedure, a History and Physical was                        performed, and patient medications and allergies were                        reviewed. The patient is competent. The risks and                        benefits of the procedure and the sedation options and                         risks were discussed with the patient. All questions                        were answered and informed consent was obtained. Patient                        identification and proposed procedure were verified by                        the nurse in the pre-procedure area. Mental Status                        Examination: alert and oriented. Airway Examination:                        Mallampati Class II (the uvula but not tonsillar pillars                        visualized). Respiratory Examination: clear to                        auscultation. CV Examination: normal. ASA Grade                        Assessment: II - A patient with mild systemic disease.                        After reviewing the risks and benefits, the patient was                        deemed in satisfactory condition to undergo the                        procedure. The anesthesia plan was to use general                        anesthesia. Immediately prior to administration of                        medications, the patient was re-assessed for adequacy to                        receive sedatives. The heart rate, respiratory rate,                        oxygen saturations, blood pressure, adequacy of                        pulmonary ventilation, and response to care were                        monitored throughout the procedure. The physical status                        of the patient was re-assessed after the procedure.                        After obtaining informed consent, the scope was passed                        under direct vision. Throughout the procedure, the                        patient's blood pressure, pulse, and oxygen saturations                        were monitored continuously. The duodenoscope was                        introduced through the mouth, and used to inject                        contrast into and used to inject contrast into the bile                        duct and ventral pancreatic duct. The ERCP  "was                        accomplished without difficulty. The patient tolerated                        the procedure well.                                                                                     Findings:        A  film of the right upper quadrant demonstrated surgical clips.        Limited white light views of the foregut were unremarkable including        those of the ampulla. Initial cannulation was of the ventral duct,        therefore the ventral pancreatic duct was deeply cannulated with the        short-nosed traction sphincterotome in concert with an 0.025\" Visiglide        wire. Contrast was injected and I personally interpreted the pancreatic        duct images. Ductal flow of contrast was adequate, image quality was        excellent and contrast extended throughout the ventral pancreatic duct        which was unremarkable throughout. Dual wire technique was then employed        and the bile duct was deeply cannulated with the short-nosed traction        sphincterotome and contrast was injected. The intrahepatics were overall        sparse in concentration through decompressed and unremarkable as was the        bifucation. The donor common was also decompressed and matched the        caliber of the recipient common. There was smooth narrowing on approach        of the ampulla, suggestive of benign ampullary stenosis or perhaps        chronic inflammation from the pancreas. There was a slight narrowing at        the anastomosis appreciated only on sweep and drainage was excellent        upstream. An 8 mm biliary sphincterotomy was made with a monofilament        traction (standard) sphincterotome using ERBE electrocautery. There was        no post-sphincterotomy bleeding and drainage throughout was excellent. A        4 Fr by 11 cm HF stent with a 3/4 external pigtail and no internal flaps        was placed 11 cm into the ventral pancreatic duct. Clear fluid flowed        through the " stent and the stent was in good position. To discover        objects, the biliary tree was swept with a 12 mm balloon starting at the        upper third of the main bile duct. Nothing beyond bile and contrast was        found. A decision was made against biliary stent placement given the        excellent drainage throuhgout.                                                                                     Impression:          - Sparse though otherwise healthy appearing intrahepatics                        - Benign appearing distal biliary stenosis likely                        related to either papillary stenosis (though did not                        resolve completely with generous sphincterotomy) or                        chronic inflammation of the pancreas                        - Very mild, likely clinically insignficant narrowing at                        the anastomosis                        - No biliary stent placed given excellent drainge of the                        entire system after sphincterotomy                        - Prophylactic pancreatic stent placed as dual wire                        technique required; normal pancreatic duct demonstrated   Recommendation:      - Standard outpatient general anesthesia recovery with                        probable discharge home this afternoon                        - Follow up with your established providers                        - Avoid antithrombotics for at least three days                        - Complete a short course of antibiotic as prescribed                        - Plain film of the abdomen in 2-3 weeks; with evidence                        of persistent Frost in situ, EGD for removal                        - Interval ERCP not planned at this juncture                        - The findings and recommendations were discussed with                        the patient and their family                                                                                        electronically signed by STEPHANIE Gaitan

## 2018-05-22 NOTE — ANESTHESIA POSTPROCEDURE EVALUATION
Patient: Camacho Bhagat    Procedure(s):  Endoscopic Retrograde Cholangiopancreatography with sphincterotomy and pancreatic duct stent placement - Wound Class: II-Clean Contaminated    Diagnosis:Cholangitis   Diagnosis Additional Information: No value filed.    Anesthesia Type:  General, ETT    Note:  Anesthesia Post Evaluation    Patient location during evaluation: PACU  Patient participation: Able to fully participate in evaluation  Level of consciousness: awake and alert  Pain management: adequate  Airway patency: patent  Cardiovascular status: acceptable  Respiratory status: acceptable  Hydration status: acceptable  PONV: none     Anesthetic complications: None          Last vitals:  Vitals:    05/22/18 1215 05/22/18 1230 05/22/18 1245   BP: 141/85 148/86 141/81   Resp: 17 18 19   Temp:      SpO2: 100% 100% 100%         Electronically Signed By: Momo Talebrt MD  May 22, 2018  12:55 PM

## 2018-05-22 NOTE — IP AVS SNAPSHOT
MRN:[id number]                      After Visit Summary   5/22/2018    Camacho Bhagat    MRN: [id number]           Thank you!     Thank you for choosing [facility] for your care. Our goal is always to provide you with excellent care. Hearing back from our patients is one way we can continue to improve our services. Please take a few minutes to complete the written survey that you may receive in the mail after you visit with us. Thank you!        Patient Information     Date Of Birth          1965        About your hospital stay     You were admitted on:  May 22, 2018 You last received care in the:  Post Anesthesia Care Unit [facility]    You were discharged on:  May 22, 2018       Who to Call     For medical emergencies, please call 911.  For non-urgent questions about your medical care, please call your primary care provider or clinic, [phone]  For questions related to your surgery, please call your surgery clinic        Attending Provider     Provider Specialty    Zay Gaitan MD Gastroenterology       Primary Care Provider Office Phone # Fax #    Shay Kirkpatrick [phone]      After Care Instructions     Discharge Instructions       Resume pre procedure diet            Discharge Instructions       Restart home medications.                  Your next 10 appointments already scheduled     Jun 06, 2018 12:45 PM CDT   (Arrive by 12:30 PM)   Return Liver Transplant with Toñito Camejo MD   [facility] Hepatology ([facility] [place])    [address] [place] [address]  [address] [place] [address] [phone]            Aug 15, 2018 11:10 AM CDT   (Arrive by 10:40 AM)   Return Visit with Jael Rubio MD   [facility] Nephrology ([facility])    [address] [place] [address]  [address] [place] [address] [phone]            Aug 20, 2018  9:30 AM CDT   (Arrive by 9:15 AM)   RETURN ENDOCRINE with Alisa Carl  MD Brett   [facility] Endocrinology ([facility])    [address] [facility]  [address] [facility] [address] [phone]              Further instructions from your care team       ..[facility] [place]  Same-Day Surgery   Adult Discharge Orders & Instructions     For 24 hours after surgery    1. Get plenty of rest.  A responsible adult must stay with you for at least 24 hours after you leave the hospital.   2. Do not drive or use heavy equipment.  If you have weakness or tingling, don't drive or use heavy equipment until this feeling goes away.  3. Do not drink alcohol.  4. Avoid strenuous or risky activities.  Ask for help when climbing stairs.   5. You may feel lightheaded.  IF so, sit for a few minutes before standing.  Have someone help you get up.   6. If you have nausea (feel sick to your stomach): Drink only clear liquids such as apple juice, ginger ale, broth or 7-Up.  Rest may also help.  Be sure to drink enough fluids.  Move to a regular diet as you feel able.  7. You may have a slight fever. Call the doctor if your fever is over 100 F (37.7 C) (taken under the tongue) or lasts longer than 24 hours.  8. You may have a dry mouth, a sore throat, muscle aches or trouble sleeping.  These should go away after 24 hours.  9. Do not make important or legal decisions.   Call your doctor for any of the followin.  Signs of infection (fever, growing tenderness at the surgery site, a large amount of drainage or bleeding, severe pain, foul-smelling drainage, redness, swelling).    2. It has been over 8 to 10 hours since surgery and you are still not able to urinate (pass water).    3.  Headache for over 24 hours.    4.  Numbness, tingling or weakness the day after surgery (if you had spinal anesthesia).  To contact doctor duarte, call [phone]  or:        [phone] and ask for the resident on call for gastroenterology (answered 24 hours a  day)      Emergency Department: [facility]: [phone]             Pending Results     No orders found from 5/20/2018 to 5/23/2018.            Admission Information     Date & Time Provider Department Dept. Phone    5/22/2018 Zay Gaitan MD Post Anesthesia Care Unit [facility] [phone]      Your Vitals Were     Blood Pressure Temperature Respirations Height Weight Pulse Oximetry    141/81 98.5  F (36.9  C) 19 1.829 m (6') 98.1 kg (216 lb 4.3 oz) 100%    BMI (Body Mass Index)                   29.33 kg/m2           MyChart Information     [facility] gives you secure access to your electronic health record. If you see a primary care provider, you can also send messages to your care team and make appointments. If you have questions, please call your primary care clinic.  If you do not have a primary care provider, please call [phone] and they will assist you.        Care EveryWhere ID     This is your Care EveryWhere ID. This could be used by other organizations to access your [facility] medical records  [id number]        Equal Access to Services     FRANKLIN PORTILLO : Hadii tavo lawrence Sojose, waaxda luqadaha, qaybta kaalmamachelle dolan, devi duckworth. So [facility] [phone].    ATENCIÓN: Si habla español, tiene a zheng disposición servicios gratuitos de asistencia lingüística. Shahla al [phone].    We comply with applicable federal civil rights laws and [place] laws. We do not discriminate on the basis of race, color, national origin, age, disability, sex, sexual orientation, or gender identity.               Review of your medicines      START taking        Dose / Directions    ciprofloxacin 500 MG tablet   Commonly known as:  CIPRO   Used for:  Liver transplant recipient (H)        Dose:  500 mg   Take 1 tablet (500 mg) by mouth 2 times daily for 5 days   Quantity:  10 tablet   Refills:  0         CONTINUE these medicines which may have CHANGED, or have new  prescriptions. If we are uncertain of the size of tablets/capsules you have at home, strength may be listed as something that might have changed.        Dose / Directions    amLODIPine 10 MG tablet   Commonly known as:  NORVASC   This may have changed:  when to take this   Used for:  HTN (hypertension)        Dose:  10 mg   Take 1 tablet (10 mg) by mouth daily   Quantity:  90 tablet   Refills:  3       cholecalciferol 1000 UNIT tablet   Commonly known as:  vitamin D3   This may have changed:  when to take this   Used for:  Vitamin D deficiency        Dose:  1000 Units   Take 1 tablet (1,000 Units) by mouth daily   Quantity:  100 tablet   Refills:  3       cyclobenzaprine 10 MG tablet   Commonly known as:  FLEXERIL   This may have changed:  when to take this   Used for:  Immunosuppression (H)        Dose:  10 mg   Take 1 tablet (10 mg) by mouth 3 times daily as needed for muscle spasms   Quantity:  90 tablet   Refills:  0       linagliptin 5 MG Tabs tablet   Commonly known as:  TRADJENTA   This may have changed:  when to take this   Used for:  Type 2 diabetes mellitus with diabetic polyneuropathy, with long-term current use of insulin (H)        Dose:  5 mg   Take 1 tablet (5 mg) by mouth daily   Quantity:  90 tablet   Refills:  3       losartan 25 MG tablet   Commonly known as:  COZAAR   This may have changed:  when to take this   Used for:  Persistent proteinuria, Hyperkalemia, Benign essential hypertension        Dose:  25 mg   Take 1 tablet (25 mg) by mouth daily   Quantity:  30 tablet   Refills:  11       metoprolol succinate 50 MG 24 hr tablet   Commonly known as:  TOPROL-XL   This may have changed:  when to take this   Used for:  Benign essential hypertension, Persistent proteinuria, Hyperkalemia        Dose:  50 mg   Take 1 tablet (50 mg) by mouth daily   Quantity:  30 tablet   Refills:  11       mycophenolic acid 360 MG EC tablet   Commonly known as:  GENERIC EQUIVALENT   This may have changed:  when to take  this   Used for:  History of liver transplant (H), Long-term use of immunosuppressant medication        Dose:  720 mg   Take 2 tablets (720 mg) by mouth every 12 hours   Quantity:  120 tablet   Refills:  3       predniSONE 2.5 MG tablet   Commonly known as:  DELTASONE   This may have changed:  when to take this   Used for:  Liver replaced by transplant (H), Long-term use of immunosuppressant medication        Dose:  2.5 mg   Take 1 tablet (2.5 mg) by mouth daily   Quantity:  90 tablet   Refills:  3       tacrolimus 1 MG capsule   Commonly known as:  GENERIC EQUIVALENT   This may have changed:  when to take this   Used for:  Liver transplant recipient (H)        Dose:  1 mg   Take 1 capsule (1 mg) by mouth every 12 hours   Quantity:  60 capsule   Refills:  11         CONTINUE these medicines which have NOT CHANGED        Dose / Directions    BLOOD GLUCOSE TEST STRIPS Strp   Used for:  Type 2 diabetes mellitus with diabetic polyneuropathy, with long-term current use of insulin (H)        Dose:  1 strip   1 strip by Lancet route 4 times daily   Quantity:  100 each   Refills:  11       CIALIS 5 MG tablet   Generic drug:  tadalafil        Dose:  5 mg   Take 5 mg by mouth as needed   Refills:  1       furosemide 40 MG tablet   Commonly known as:  LASIX   Used for:  Hyperkalemia, Persistent proteinuria, Benign essential hypertension        Dose:  40 mg   Take 1 tablet (40 mg) by mouth 2 times daily   Quantity:  60 tablet   Refills:  11       GABAPENTIN PO        Dose:  300 mg   Take 300 mg by mouth 3 times daily   Refills:  0       insulin glargine 100 UNIT/ML injection   Commonly known as:  LANTUS   Used for:  Type 2 diabetes mellitus with diabetic polyneuropathy, with long-term current use of insulin (H)        Dose:  50 Units   Inject 50 Units Subcutaneous every morning   Quantity:  18 mL   Refills:  11       insulin pen needle 32G X 4 MM   Commonly known as:  BD ENE U/F   Used for:  Type 2 diabetes mellitus with  diabetic polyneuropathy, with long-term current use of insulin (H)        Use 1 pen needles daily or as directed.   Quantity:  100 each   Refills:  3       Multi-vitamin Tabs tablet        Dose:  1 tablet   Take 1 tablet by mouth every morning   Refills:  0       omeprazole 20 MG CR capsule   Commonly known as:  priLOSEC   Used for:  Long-term use of immunosuppressant medication, History of liver transplant (H)        Dose:  20 mg   Take 1 capsule (20 mg) by mouth 2 times daily   Quantity:  60 capsule   Refills:  11            Where to get your medicines      These medications were sent to [facility] [place], [place] - [address]  [address] [facility] [address]     Phone:  [phone]     ciprofloxacin 500 MG tablet                Protect others around you: Learn how to safely use, store and throw away your medicines at www.disposemymeds.org.        ANTIBIOTIC INSTRUCTION     You've Been Prescribed an Antibiotic - Now What?  Your healthcare team thinks that you or your loved one might have an infection. Some infections can be treated with antibiotics, which are powerful, life-saving drugs. Like all medications, antibiotics have side effects and should only be used when necessary. There are some important things you should know about your antibiotic treatment.      Your healthcare team may run tests before you start taking an antibiotic.    Your team may take samples (e.g., from your blood, urine or other areas) to run tests to look for bacteria. These test can be important to determine if you need an antibiotic at all and, if you do, which antibiotic will work best.      Within a few days, your healthcare team might change or even stop your antibiotic.    Your team may start you on an antibiotic while they are working to find out what is making you sick.    Your team might change your antibiotic because test results show that a different antibiotic would be better to treat  your infection.    In some cases, once your team has more information, they learn that you do not need an antibiotic at all. They may find out that you don't have an infection, or that the antibiotic you're taking won't work against your infection. For example, an infection caused by a virus can't be treated with antibiotics. Staying on an antibiotic when you don't need it is more likely to be harmful than helpful.      You may experience side effects from your antibiotic.    Like all medications, antibiotics have side effects. Some of these can be serious.    Let you healthcare team know if you have any known allergies when you are admitted to the hospital.    One significant side effect of nearly all antibiotics is the risk of severe and sometimes deadly diarrhea caused by Clostridium difficile (C. Difficile). This occurs when a person takes antibiotics because some good germs are destroyed. Antibiotic use allows C. diificile to take over, putting patients at high risk for this serious infection.    As a patient or caregiver, it is important to understand your or your loved one's antibiotic treatment. It is especially important for caregivers to speak up when patients can't speak for themselves. Here are some important questions to ask your healthcare team.    What infection is this antibiotic treating and how do you know I have that infection?    What side effects might occur from this antibiotic?    How long will I need to take this antibiotic?    Is it safe to take this antibiotic with other medications or supplements (e.g., vitamins) that I am taking?     Are there any special directions I need to know about taking this antibiotic? For example, should I take it with food?    How will I be monitored to know whether my infection is responding to the antibiotic?    What tests may help to make sure the right antibiotic is prescribed for me?      Information provided by:  www.cdc.gov/getsmart  U.S. Department of  Health and Human Services  Centers for disease Control and Prevention  National Center for Emerging and Zoonotic Infectious Diseases  Division of Healthcare Quality Promotion             Medication List: This is a list of all your medications and when to take them. Check marks below indicate your daily home schedule. Keep this list as a reference.      Medications           Morning Afternoon Evening Bedtime As Needed    amLODIPine 10 MG tablet   Commonly known as:  NORVASC   Take 1 tablet (10 mg) by mouth daily                                BLOOD GLUCOSE TEST STRIPS Strp   1 strip by Lancet route 4 times daily                                cholecalciferol 1000 UNIT tablet   Commonly known as:  vitamin D3   Take 1 tablet (1,000 Units) by mouth daily                                CIALIS 5 MG tablet   Take 5 mg by mouth as needed   Generic drug:  tadalafil                                ciprofloxacin 500 MG tablet   Commonly known as:  CIPRO   Take 1 tablet (500 mg) by mouth 2 times daily for 5 days                                cyclobenzaprine 10 MG tablet   Commonly known as:  FLEXERIL   Take 1 tablet (10 mg) by mouth 3 times daily as needed for muscle spasms                                furosemide 40 MG tablet   Commonly known as:  LASIX   Take 1 tablet (40 mg) by mouth 2 times daily                                GABAPENTIN PO   Take 300 mg by mouth 3 times daily                                insulin glargine 100 UNIT/ML injection   Commonly known as:  LANTUS   Inject 50 Units Subcutaneous every morning                                insulin pen needle 32G X 4 MM   Commonly known as:  BD ENE U/F   Use 1 pen needles daily or as directed.                                linagliptin 5 MG Tabs tablet   Commonly known as:  TRADJENTA   Take 1 tablet (5 mg) by mouth daily                                losartan 25 MG tablet   Commonly known as:  COZAAR   Take 1 tablet (25 mg) by mouth daily                                 metoprolol succinate 50 MG 24 hr tablet   Commonly known as:  TOPROL-XL   Take 1 tablet (50 mg) by mouth daily                                Multi-vitamin Tabs tablet   Take 1 tablet by mouth every morning                                mycophenolic acid 360 MG EC tablet   Commonly known as:  GENERIC EQUIVALENT   Take 2 tablets (720 mg) by mouth every 12 hours                                omeprazole 20 MG CR capsule   Commonly known as:  priLOSEC   Take 1 capsule (20 mg) by mouth 2 times daily                                predniSONE 2.5 MG tablet   Commonly known as:  DELTASONE   Take 1 tablet (2.5 mg) by mouth daily                                tacrolimus 1 MG capsule   Commonly known as:  GENERIC EQUIVALENT   Take 1 capsule (1 mg) by mouth every 12 hours

## 2018-05-23 ENCOUNTER — TELEPHONE (OUTPATIENT)
Dept: TRANSPLANT | Facility: CLINIC | Age: 53
End: 2018-05-23

## 2018-05-23 ENCOUNTER — TELEPHONE (OUTPATIENT)
Dept: INTERVENTIONAL RADIOLOGY/VASCULAR | Facility: CLINIC | Age: 53
End: 2018-05-23

## 2018-05-23 DIAGNOSIS — Z79.60 LONG-TERM USE OF IMMUNOSUPPRESSANT MEDICATION: ICD-10-CM

## 2018-05-23 DIAGNOSIS — Z94.4 HISTORY OF LIVER TRANSPLANT (H): Primary | ICD-10-CM

## 2018-05-23 RX ORDER — SODIUM CHLORIDE 9 MG/ML
INJECTION, SOLUTION INTRAVENOUS CONTINUOUS
Status: CANCELLED | OUTPATIENT
Start: 2018-05-23

## 2018-05-23 RX ORDER — LIDOCAINE 40 MG/G
CREAM TOPICAL
Status: CANCELLED | OUTPATIENT
Start: 2018-05-23

## 2018-05-23 NOTE — TELEPHONE ENCOUNTER
"May 23, 2018: I spoke with Camacho to reschedule the liver BX from Thursday, 5/24 to Friday, 5/25. IR does not have availability until next Wednesday, 5/30 or later. I asked him if I should schedule him for next Wednesday, and Camacho stated, \"everyone works and nobody can take time off work to give me a ride\".    He asked for the phone number for Interventional Radiology so he could reschedule himself. I told him that I would ask IR to call him, and if they choose to give him their phone number, they could. I also asked IR to call me to discuss.    Angelica Posadas  Transplant   [phone]    "

## 2018-05-23 NOTE — TELEPHONE ENCOUNTER
Spoke with Camacho who has a cold, runny nose today, after having ERCP yesterday.  He asks what he can take for symptoms, reviewed with him that he can take over the counter cold remedies, ie claritin.   He states he doesn't have a fever, or cough or other symptoms.  Discussed request per , doppler U/S and liver biopsy.  He verbalizes understanding, that  will call him, will try to plan this to be done before w/e.

## 2018-05-23 NOTE — TELEPHONE ENCOUNTER
Patient Call: General      Reason for call: patient would like to talk about scheduling his Biopsy and other things.    Call back needed? Yes    Return Call Needed  Same as documented in contacts section  When to return call?: Greater than one day: Route standard priority

## 2018-05-23 NOTE — Clinical Note
Hopefully pt can get this done before the w/e??   Should do the U/S before the biopsy, can he do the U/S at the hospital?  He knows you will call him with day and time. sang Samuels

## 2018-05-23 NOTE — TELEPHONE ENCOUNTER
May 23, 2018: Miguel Angel from Interventional Radiology ([phone]) left a message for the patient to call him back to rechedule the liver BX.

## 2018-05-24 ENCOUNTER — CARE COORDINATION (OUTPATIENT)
Dept: GASTROENTEROLOGY | Facility: CLINIC | Age: 53
End: 2018-05-24

## 2018-05-24 DIAGNOSIS — Z46.89 ENCOUNTER FOR REMOVAL OF PANCREATIC STENT: Primary | ICD-10-CM

## 2018-05-24 NOTE — TELEPHONE ENCOUNTER
Patient Call: General  Route to LPN    Reason for call: Pt is return call again regarding his biopsy. Please return call when available.    Call back needed? Yes    Return Call Needed  Same as documented in contacts section  When to return call?: Same day: Route High Priority

## 2018-05-24 NOTE — PROGRESS NOTES
"Post ERCP (5/22/2018) with Dr. Gaitan: Follow-up    Post procedure recommendations: Plain film of the abdomen in 2-3 weeks; with evidence of persistent Frost in situ, EGD for removal     Orders placed: x-ray due June 13th.     Patient states he is doing well.  Denies nausea vomiting fever and pain.  Taking in PO with no issues.  Complains of a \"massive head cold\" but feels well otherwise.  He will get his x-ray done at a local [facility] clinic and is aware due by June 13.     Clinic contact and scheduling numbers verified for future questions/concerns.     Izabel STOVER RN Coordinator  Dr. Tracey, Dr. Gaitan & Dr. Quinones  Advanced Endoscopy  [phone]          "

## 2018-05-25 ENCOUNTER — HOSPITAL ENCOUNTER (OUTPATIENT)
Dept: LAB | Facility: CLINIC | Age: 53
Discharge: HOME | End: 2018-05-25
Attending: INTERNAL MEDICINE
Payer: COMMERCIAL

## 2018-05-25 DIAGNOSIS — Z79.60 LONG-TERM USE OF IMMUNOSUPPRESSANT MEDICATION: ICD-10-CM

## 2018-05-25 DIAGNOSIS — Z94.4 HISTORY OF LIVER TRANSPLANT (H): ICD-10-CM

## 2018-05-25 DIAGNOSIS — N18.4 CHRONIC KIDNEY DISEASE, STAGE 4 (SEVERE) (H): ICD-10-CM

## 2018-05-25 LAB
ALBUMIN SERPL-MCNC: 3 G/DL (ref 3.4–5)
ALP SERPL-CCNC: 699 U/L (ref 40–150)
ALT SERPL W P-5'-P-CCNC: 96 U/L (ref 0–70)
ANION GAP SERPL CALCULATED.3IONS-SCNC: 6 MMOL/L (ref 3–14)
AST SERPL W P-5'-P-CCNC: 101 U/L (ref 0–45)
BILIRUB DIRECT SERPL-MCNC: 0.4 MG/DL (ref 0–0.2)
BILIRUB SERPL-MCNC: 0.9 MG/DL (ref 0.2–1.3)
BUN SERPL-MCNC: 32 MG/DL (ref 7–30)
CALCIUM SERPL-MCNC: 8.1 MG/DL (ref 8.5–10.1)
CHLORIDE SERPL-SCNC: 103 MMOL/L (ref 94–109)
CO2 SERPL-SCNC: 25 MMOL/L (ref 20–32)
CREAT SERPL-MCNC: 1.4 MG/DL (ref 0.66–1.25)
ERYTHROCYTE [DISTWIDTH] IN BLOOD BY AUTOMATED COUNT: 15.7 % (ref 10–15)
GFR SERPL CREATININE-BSD FRML MDRD: 53 ML/MIN/1.7M2
GLUCOSE SERPL-MCNC: 317 MG/DL (ref 70–99)
HCT VFR BLD AUTO: 32.6 % (ref 40–53)
HGB BLD-MCNC: 10.7 G/DL (ref 13.3–17.7)
MAGNESIUM SERPL-MCNC: 1.7 MG/DL (ref 1.6–2.3)
MCH RBC QN AUTO: 31.5 PG (ref 26.5–33)
MCHC RBC AUTO-ENTMCNC: 32.8 G/DL (ref 31.5–36.5)
MCV RBC AUTO: 96 FL (ref 78–100)
PHOSPHATE SERPL-MCNC: 3.3 MG/DL (ref 2.5–4.5)
PLATELET # BLD AUTO: 81 10E9/L (ref 150–450)
POTASSIUM SERPL-SCNC: 4.7 MMOL/L (ref 3.4–5.3)
PROT SERPL-MCNC: 6.7 G/DL (ref 6.8–8.8)
RBC # BLD AUTO: 3.4 10E12/L (ref 4.4–5.9)
SODIUM SERPL-SCNC: 134 MMOL/L (ref 133–144)
TACROLIMUS BLD-MCNC: <3 UG/L (ref 5–15)
TME LAST DOSE: ABNORMAL H
WBC # BLD AUTO: 4.3 10E9/L (ref 4–11)

## 2018-05-25 PROCEDURE — 84075 ASSAY ALKALINE PHOSPHATASE: CPT

## 2018-05-25 PROCEDURE — 83735 ASSAY OF MAGNESIUM: CPT

## 2018-05-25 PROCEDURE — 84100 ASSAY OF PHOSPHORUS: CPT

## 2018-05-25 PROCEDURE — 84460 ALANINE AMINO (ALT) (SGPT): CPT

## 2018-05-25 PROCEDURE — 36415 COLL VENOUS BLD VENIPUNCTURE: CPT

## 2018-05-25 PROCEDURE — 80069 RENAL FUNCTION PANEL: CPT

## 2018-05-25 PROCEDURE — 82247 BILIRUBIN TOTAL: CPT

## 2018-05-25 PROCEDURE — 82248 BILIRUBIN DIRECT: CPT

## 2018-05-25 PROCEDURE — 84155 ASSAY OF PROTEIN SERUM: CPT

## 2018-05-25 PROCEDURE — 80197 ASSAY OF TACROLIMUS: CPT | Performed by: INTERNAL MEDICINE

## 2018-05-25 PROCEDURE — 85027 COMPLETE CBC AUTOMATED: CPT

## 2018-05-25 PROCEDURE — 84450 TRANSFERASE (AST) (SGOT): CPT

## 2018-05-29 ENCOUNTER — HOSPITAL ENCOUNTER (OUTPATIENT)
Dept: ULTRASOUND IMAGING | Facility: CLINIC | Age: 53
End: 2018-05-29
Attending: TRANSPLANT SURGERY
Payer: COMMERCIAL

## 2018-05-29 ENCOUNTER — TELEPHONE (OUTPATIENT)
Dept: INTERVENTIONAL RADIOLOGY/VASCULAR | Facility: CLINIC | Age: 53
End: 2018-05-29

## 2018-05-29 DIAGNOSIS — Z79.60 LONG-TERM USE OF IMMUNOSUPPRESSANT MEDICATION: ICD-10-CM

## 2018-05-29 DIAGNOSIS — Z94.4 HISTORY OF LIVER TRANSPLANT (H): ICD-10-CM

## 2018-05-29 LAB — RADIOLOGIST FLAGS: NORMAL

## 2018-05-29 PROCEDURE — 93975 VASCULAR STUDY: CPT | Mod: TC

## 2018-06-01 ENCOUNTER — APPOINTMENT (OUTPATIENT)
Dept: INTERVENTIONAL RADIOLOGY/VASCULAR | Facility: CLINIC | Age: 53
End: 2018-06-01
Attending: TRANSPLANT SURGERY
Payer: COMMERCIAL

## 2018-06-01 ENCOUNTER — APPOINTMENT (OUTPATIENT)
Dept: MEDSURG UNIT | Facility: CLINIC | Age: 53
End: 2018-06-01
Attending: TRANSPLANT SURGERY
Payer: COMMERCIAL

## 2018-06-01 ENCOUNTER — HOSPITAL ENCOUNTER (OUTPATIENT)
Facility: CLINIC | Age: 53
Discharge: HOME | End: 2018-06-01
Attending: TRANSPLANT SURGERY | Admitting: TRANSPLANT SURGERY
Payer: COMMERCIAL

## 2018-06-01 VITALS
TEMPERATURE: 99 F | SYSTOLIC BLOOD PRESSURE: 124 MMHG | RESPIRATION RATE: 18 BRPM | OXYGEN SATURATION: 95 % | DIASTOLIC BLOOD PRESSURE: 78 MMHG

## 2018-06-01 DIAGNOSIS — Z79.60 LONG-TERM USE OF IMMUNOSUPPRESSANT MEDICATION: ICD-10-CM

## 2018-06-01 DIAGNOSIS — Z94.4 HISTORY OF LIVER TRANSPLANT (H): ICD-10-CM

## 2018-06-01 LAB
GLUCOSE BLDC GLUCOMTR-MCNC: 123 MG/DL (ref 70–99)
INR PPP: 0.89 (ref 0.86–1.14)

## 2018-06-01 PROCEDURE — 76942 ECHO GUIDE FOR BIOPSY: CPT

## 2018-06-01 PROCEDURE — 25000128 H RX IP 250 OP 636: Performed by: RADIOLOGY

## 2018-06-01 PROCEDURE — 88313 SPECIAL STAINS GROUP 2: CPT | Performed by: TRANSPLANT SURGERY

## 2018-06-01 PROCEDURE — 40000168 ZZH STATISTIC PP CARE STAGE 3

## 2018-06-01 PROCEDURE — 27210903 ZZH KIT CR5

## 2018-06-01 PROCEDURE — 99152 MOD SED SAME PHYS/QHP 5/>YRS: CPT

## 2018-06-01 PROCEDURE — 82962 GLUCOSE BLOOD TEST: CPT

## 2018-06-01 PROCEDURE — 40000141 ZZH STATISTIC PERIPHERAL IV START W/O US GUIDANCE

## 2018-06-01 PROCEDURE — 25000125 ZZHC RX 250: Performed by: RADIOLOGY

## 2018-06-01 PROCEDURE — 27210909 ZZH NEEDLE CR5

## 2018-06-01 PROCEDURE — 88307 TISSUE EXAM BY PATHOLOGIST: CPT | Performed by: TRANSPLANT SURGERY

## 2018-06-01 PROCEDURE — 27210732 ZZH ACCESSORY CR1

## 2018-06-01 PROCEDURE — 85610 PROTHROMBIN TIME: CPT | Performed by: RADIOLOGY

## 2018-06-01 RX ORDER — LIDOCAINE 40 MG/G
CREAM TOPICAL
Status: DISCONTINUED | OUTPATIENT
Start: 2018-06-01 | End: 2018-06-01 | Stop reason: HOSPADM

## 2018-06-01 RX ORDER — NALOXONE HYDROCHLORIDE 0.4 MG/ML
.1-.4 INJECTION, SOLUTION INTRAMUSCULAR; INTRAVENOUS; SUBCUTANEOUS
Status: DISCONTINUED | OUTPATIENT
Start: 2018-06-01 | End: 2018-06-01 | Stop reason: HOSPADM

## 2018-06-01 RX ORDER — SODIUM CHLORIDE 9 MG/ML
INJECTION, SOLUTION INTRAVENOUS CONTINUOUS
Status: DISCONTINUED | OUTPATIENT
Start: 2018-06-01 | End: 2018-06-01 | Stop reason: HOSPADM

## 2018-06-01 RX ORDER — FLUMAZENIL 0.1 MG/ML
0.2 INJECTION, SOLUTION INTRAVENOUS
Status: DISCONTINUED | OUTPATIENT
Start: 2018-06-01 | End: 2018-06-01 | Stop reason: HOSPADM

## 2018-06-01 RX ORDER — FENTANYL CITRATE 50 UG/ML
25-50 INJECTION, SOLUTION INTRAMUSCULAR; INTRAVENOUS EVERY 5 MIN PRN
Status: DISCONTINUED | OUTPATIENT
Start: 2018-06-01 | End: 2018-06-01 | Stop reason: HOSPADM

## 2018-06-01 RX ADMIN — FENTANYL CITRATE 50 MCG: 50 INJECTION INTRAMUSCULAR; INTRAVENOUS at 14:00

## 2018-06-01 RX ADMIN — LIDOCAINE HYDROCHLORIDE 5 ML: 10 INJECTION, SOLUTION EPIDURAL; INFILTRATION; INTRACAUDAL; PERINEURAL at 14:21

## 2018-06-01 RX ADMIN — MIDAZOLAM 1 MG: 1 INJECTION INTRAMUSCULAR; INTRAVENOUS at 14:00

## 2018-06-01 NOTE — PROGRESS NOTES
Pt returned to 2A s/p liver biopsy. VSS, pt denies pain. Mid upper abd site CDI. Continue to monitor

## 2018-06-01 NOTE — PROCEDURES
Interventional Radiology Brief Post Procedure Note    Procedure: Random liver transplant biopsy    Proceduralist: Philippe Holloway MD    Assistant: Radiology Resident Physician, Rock Patel MD    Time Out: Prior to the start of the procedure and with procedural staff participation, I verbally confirmed the patient s identity using two indicators, relevant allergies, that the procedure was appropriate and matched the consent or emergent situation, and that the correct equipment/implants were available. Immediately prior to starting the procedure I conducted the Time Out with the procedural staff and re-confirmed the patient s name, procedure, and site/side. (The Joint Commission universal protocol was followed.)  Yes        Sedation: IR Nurse Monitored Care   Post Procedure Summary:  Prior to the start of the procedure and with procedural staff participation, I verbally confirmed the patient s identity using two indicators, relevant allergies, that the procedure was appropriate and matched the consent or emergent situation, and that the correct equipment/implants were available. Immediately prior to starting the procedure I conducted the Time Out with the procedural staff and re-confirmed the patient s name, procedure, and site/side. (The Joint Commission universal protocol was followed.)  Yes       Sedatives: Fentanyl and Midazolam (Versed)    Vital signs, airway and pulse oximetry were monitored and remained stable throughout the procedure and sedation was maintained until the procedure was complete.  The patient was monitored by staff until sedation discharge criteria were met.    Patient tolerance: Patient tolerated the procedure well with no immediate complications.    Time of sedation in minutes: 20 minutes from beginning to end of physician one to one monitoring.          Findings: Liver biopsy    Estimated Blood Loss: Minimal    Fluoroscopy Time:  minute(s)    SPECIMENS: Core needle biopsy specimens sent for  pathological analysis    Complications: 1. None     Condition: Stable    Plan: Bedrest for 4 hours.    Comments: See dictated procedure note for full details.    Kamran Patel MD

## 2018-06-01 NOTE — DISCHARGE INSTRUCTIONS
[facility]    Interventional Radiology  Patient Instructions Following Liver Biopsy    AFTER YOU GO HOME  ? If you were given sedation DO NOT drive or operate machinery at home or at work for at least 24 hours  ? DO relax and take it easy for 48 hours, no strenuous activity for 24 hours  ? DO drink plenty of fluids  ? DO resume your regular diet, unless otherwise instructed by your Primary Physician  ? Keep the dressing dry and in place for 24 hours.  ? DO NOT SMOKE FOR AT LEAST 24 HOURS, if you have been given any medications that were to help you relax or sedate you during your procedure  ? DO NOT drink alcoholic beverages the day of your procedure  ? DO NOT do any strenuous exercise or lifting (> 10 lbs) for at least 7 days following your procedure  ? DO NOT take a bath or shower for at least 12 hours following your procedure  ? Remove dressing after shower the next day. Replace with Band aid for 2 days.  Never leave a wet dressing in place.  ? DO NOT make any important or legal decisions for 24 hours following your procedure  ? There should be minimum drainage from the biopsy site    CALL THE PHYSICIAN IF:  ? You start bleeding from the procedure site.  If you do start to bleed from that site, lie down flat and hold pressure on the site for a minimum of 10 minutes.  Your physician will tell you if you need to return to the hospital  ? You develop nausea or vomiting  ? You have excessive swelling, redness, or tenderness at the site  ? You have drainage that looks like it is infected.  ? You experience severe pain  ? You develop hives or a rash or unexplained itching  ? You develop shortness of breath  ? You develop a temperature of 101 degrees F or greater  ? You develop chest pain or cough up blood, lightheadedness or fainting    [facility] INTERVENTIONAL RADIOLOGY DEPARTMENT  Procedure Physician: ***  Dr. Patel/ Dr. Holloway                                   Date of procedure: June 1,  2018  Telephone Numbers: [phone] Monday-Friday 8:00 am to 4:30 pm  [phone] After 4:30 pm Monday-Friday, Weekends & Holidays.   Ask for the Interventional Radiologist on call.  Someone is on call 24 hrs/day  [facility] toll free number: [phone] Monday-Friday 8:00 am to 4:30 pm  [facility] Emergency Dept: [phone]

## 2018-06-01 NOTE — IP AVS SNAPSHOT
[address] [address]                                       After Visit Summary   6/1/2018    Camacho Bhagat    MRN: [id number]           After Visit Summary Signature Page     I have received my discharge instructions, and my questions have been answered. I have discussed any challenges I see with this plan with the nurse or doctor.    ..........................................................................................................................................  Patient/Patient Representative Signature      ..........................................................................................................................................  Patient Representative Print Name and Relationship to Patient    ..................................................               ................................................  Date                                            Time    ..........................................................................................................................................  Reviewed by Signature/Title    ...................................................              ..............................................  Date                                                            Time

## 2018-06-01 NOTE — PROGRESS NOTES
Interventional Radiology Intra-procedural Nursing Note    Patient Name: Camacho Bhagat  Medical Record Number: [id number]  Today's Date: June 1, 2018    Procedure: random transplanted liver biopsy    Attending MD in room during timeout: Dr Holloway  Proceduralist:  Dr Patel    Procedure Start Time: 1402  Procedure Puncture time: 1405  Procedure End Time: 1420    Sedation start time: 1400  Sedation end time: 1420  Sedation medications given: versed 1 mg, fentanyl 50 mcg IV    Report given to: Gayathri JEFFREY 2A  : not needed    D: Patient brought to IR room 6 at 1340. Verified Patient's ID and informed consent using two identifiers. Denied having questions or concerns.  A: Patient was positioned supine and was monitored per IR protocol. Patient tolerated the procedure without apparent incident. The dressing over the right upper quadrant puncture is clean, dry and intact.  P: Patient returned to 2A for post procedure cares. Patient is to be on strict bedrest for four hours post procedure.    Cinda Pineda RN  [phone]

## 2018-06-01 NOTE — PROGRESS NOTES
Interventional Radiology Pre-Procedure Sedation Assessment   Time of Assessment: 12:31 PM    Expected Level: Moderate Sedation    Indication: Sedation is required for the following type of Procedure: Biopsy    Sedation and procedural consent: Risks, benefits and alternatives were discussed with Patient    PO Intake: Appropriately NPO for procedure    ASA Class: Class 2 - MILD SYSTEMIC DISEASE, NO ACUTE PROBLEMS, NO FUNCTIONAL LIMITATIONS.    Mallampati: Grade 2:  Soft palate, base of uvula, tonsillar pillars, and portion of posterior pharyngeal wall visible    Lungs: Lungs Clear with good breath sounds bilaterally    Heart: Normal heart sounds and rate    History and physical reviewed and no updates needed. I have reviewed the lab findings, diagnostic data, medications, and the plan for sedation. I have determined this patient to be an appropriate candidate for the planned sedation and procedure and have reassessed the patient IMMEDIATELY PRIOR to sedation and procedure.    Kamran Patel MD

## 2018-06-01 NOTE — PROGRESS NOTES
1530 Care assumed by writer at this time. VSS RA. Dressing c/d/i. No pain. Pt tolerating oral intake. 1700 Pt ambulated to bathroom, tolerated well. 1800 Dc instructions reviewed, copy given to pt. PIV dc'd. 1810 Pt dc'd home accompanied by family.

## 2018-06-01 NOTE — IP AVS SNAPSHOT
MRN:[id number]                      After Visit Summary   6/1/2018    Camacho Bhagat    MRN: [id number]           Visit Information        Department      6/1/2018 10:50 AM [address] [facility]          Review of your medicines      UNREVIEWED medicines. Ask your doctor about these medicines        Dose / Directions    amLODIPine 10 MG tablet   Commonly known as:  NORVASC   Used for:  HTN (hypertension)        Dose:  10 mg   Take 1 tablet (10 mg) by mouth daily   Quantity:  90 tablet   Refills:  3       cholecalciferol 1000 UNIT tablet   Commonly known as:  vitamin D3   Used for:  Vitamin D deficiency        Dose:  1000 Units   Take 1 tablet (1,000 Units) by mouth daily   Quantity:  100 tablet   Refills:  3       CIALIS 5 MG tablet   Generic drug:  tadalafil        Dose:  5 mg   Take 5 mg by mouth as needed   Refills:  1       cyclobenzaprine 10 MG tablet   Commonly known as:  FLEXERIL   Used for:  Immunosuppression (H)        Dose:  10 mg   Take 1 tablet (10 mg) by mouth 3 times daily as needed for muscle spasms   Quantity:  90 tablet   Refills:  0       furosemide 40 MG tablet   Commonly known as:  LASIX   Used for:  Hyperkalemia, Persistent proteinuria, Benign essential hypertension        Dose:  40 mg   Take 1 tablet (40 mg) by mouth 2 times daily   Quantity:  60 tablet   Refills:  11       GABAPENTIN PO        Dose:  300 mg   Take 300 mg by mouth 3 times daily   Refills:  0       insulin glargine 100 UNIT/ML injection   Commonly known as:  LANTUS   Used for:  Type 2 diabetes mellitus with diabetic polyneuropathy, with long-term current use of insulin (H)        Dose:  50 Units   Inject 50 Units Subcutaneous every morning   Quantity:  18 mL   Refills:  11       linagliptin 5 MG Tabs tablet   Commonly known as:  TRADJENTA   Used for:  Type 2 diabetes mellitus with diabetic polyneuropathy, with long-term current use of insulin (H)        Dose:  5 mg   Take 1 tablet (5 mg) by mouth daily    Quantity:  90 tablet   Refills:  3       losartan 25 MG tablet   Commonly known as:  COZAAR   Used for:  Persistent proteinuria, Hyperkalemia, Benign essential hypertension        Dose:  25 mg   Take 1 tablet (25 mg) by mouth daily   Quantity:  30 tablet   Refills:  11       metoprolol succinate 50 MG 24 hr tablet   Commonly known as:  TOPROL-XL   Used for:  Benign essential hypertension, Persistent proteinuria, Hyperkalemia        Dose:  50 mg   Take 1 tablet (50 mg) by mouth daily   Quantity:  30 tablet   Refills:  11       Multi-vitamin Tabs tablet        Dose:  1 tablet   Take 1 tablet by mouth every morning   Refills:  0       mycophenolic acid 360 MG EC tablet   Commonly known as:  GENERIC EQUIVALENT   Used for:  History of liver transplant (H), Long-term use of immunosuppressant medication        Dose:  720 mg   Take 2 tablets (720 mg) by mouth every 12 hours   Quantity:  120 tablet   Refills:  3       omeprazole 20 MG CR capsule   Commonly known as:  priLOSEC   Used for:  Long-term use of immunosuppressant medication, History of liver transplant (H)        Dose:  20 mg   Take 1 capsule (20 mg) by mouth 2 times daily   Quantity:  60 capsule   Refills:  11       predniSONE 2.5 MG tablet   Commonly known as:  DELTASONE   Used for:  Liver replaced by transplant (H), Long-term use of immunosuppressant medication        Dose:  2.5 mg   Take 1 tablet (2.5 mg) by mouth daily   Quantity:  90 tablet   Refills:  3       tacrolimus 1 MG capsule   Commonly known as:  GENERIC EQUIVALENT   Used for:  Liver transplant recipient (H)        Dose:  1 mg   Take 1 capsule (1 mg) by mouth every 12 hours   Quantity:  60 capsule   Refills:  11         CONTINUE these medicines which have NOT CHANGED        Dose / Directions    BLOOD GLUCOSE TEST STRIPS Strp   Used for:  Type 2 diabetes mellitus with diabetic polyneuropathy, with long-term current use of insulin (H)        Dose:  1 strip   1 strip by Lancet route 4 times daily    Quantity:  100 each   Refills:  11       insulin pen needle 32G X 4 MM   Commonly known as:  BD ENE U/F   Used for:  Type 2 diabetes mellitus with diabetic polyneuropathy, with long-term current use of insulin (H)        Use 1 pen needles daily or as directed.   Quantity:  100 each   Refills:  3                Protect others around you: Learn how to safely use, store and throw away your medicines at www.disposemymeds.org.         Follow-ups after your visit        Your next 10 appointments already scheduled     Jun 06, 2018 12:45 PM CDT   (Arrive by 12:30 PM)   Return Liver Transplant with Toñito Camejo MD   [facility] Hepatology ([facility])    [address] [place] [address]  [address]  [facility] [address]   [phone]            Aug 15, 2018 11:10 AM CDT   (Arrive by 10:40 AM)   Return Visit with Jael Rubio MD   [facility] Nephrology ([facility])    [address] [place] [address]  [address]  [facility] [address]   [phone]            Aug 20, 2018  9:30 AM CDT   (Arrive by 9:15 AM)   RETURN ENDOCRINE with Alisa West MD   [facility] Endocrinology ([facility])    [address] [place] [address]  3rd Floor  [facility] [address] [phone]               Care Instructions        Further instructions from your care team       [facility]    Interventional Radiology  Patient Instructions Following Liver Biopsy    AFTER YOU GO HOME  ? If you were given sedation DO NOT drive or operate machinery at home or at work for at least 24 hours  ? DO relax and take it easy for 48 hours, no strenuous activity for 24 hours  ? DO drink plenty of fluids  ? DO resume your regular diet, unless otherwise instructed by your Primary Physician  ? Keep the dressing dry and in place for 24 hours.  ? DO NOT SMOKE FOR AT LEAST 24 HOURS, if you have been given any medications that were to help you relax or sedate you during your  procedure  ? DO NOT drink alcoholic beverages the day of your procedure  ? DO NOT do any strenuous exercise or lifting (> 10 lbs) for at least 7 days following your procedure  ? DO NOT take a bath or shower for at least 12 hours following your procedure  ? Remove dressing after shower the next day. Replace with Band aid for 2 days.  Never leave a wet dressing in place.  ? DO NOT make any important or legal decisions for 24 hours following your procedure  ? There should be minimum drainage from the biopsy site    CALL THE PHYSICIAN IF:  ? You start bleeding from the procedure site.  If you do start to bleed from that site, lie down flat and hold pressure on the site for a minimum of 10 minutes.  Your physician will tell you if you need to return to the hospital  ? You develop nausea or vomiting  ? You have excessive swelling, redness, or tenderness at the site  ? You have drainage that looks like it is infected.  ? You experience severe pain  ? You develop hives or a rash or unexplained itching  ? You develop shortness of breath  ? You develop a temperature of 101 degrees F or greater  ? You develop chest pain or cough up blood, lightheadedness or fainting    [facility] INTERVENTIONAL RADIOLOGY DEPARTMENT  Procedure Physician: ***  Dr. Patel/ Dr. Holloway                                   Date of procedure: June 1, 2018  Telephone Numbers: [phone] Monday-Friday 8:00 am to 4:30 pm  [phone] After 4:30 pm Monday-Friday, Weekends & Holidays.   Ask for the Interventional Radiologist on call.  Someone is on call 24 hrs/day  [facility] toll free number: [phone] Monday-Friday 8:00 am to 4:30 pm  [facility] Emergency Dept: [phone]           Additional Information About Your Visit        [employer] Information     [employer] gives you secure access to your electronic health record. If you see a primary care provider, you can also send messages to your care team and make appointments. If you have questions, please call your  primary care clinic.  If you do not have a primary care provider, please call [phone] and they will assist you.        Care EveryWhere ID     This is your Care EveryWhere ID. This could be used by other organizations to access your [facility] medical records  [id number]        Your Vitals Were     Blood Pressure Temperature Respirations Pulse Oximetry          128/70 99  F (37.2  C) (Oral) 14 98%         Primary Care Provider Office Phone # Fax #    Shay Kirkpatrick [phone]      Equal Access to Services     [facility]: Hadii aad ku hadasho Soomaali, waaxda luqadaha, qaybta kaalmada adeegyada, devi craig . So [facility] [phone].    ATENCIÓN: Si habla español, tiene a zheng disposición servicios gratuitos de asistencia lingüística. Llame al [phone].    We comply with applicable federal civil rights laws and [place] laws. We do not discriminate on the basis of race, color, national origin, age, disability, sex, sexual orientation, or gender identity.            Thank you!     Thank you for choosing [facility] for your care. Our goal is always to provide you with excellent care. Hearing back from our patients is one way we can continue to improve our services. Please take a few minutes to complete the written survey that you may receive in the mail after you visit with us. Thank you!             Medication List: This is a list of all your medications and when to take them. Check marks below indicate your daily home schedule. Keep this list as a reference.      Medications           Morning Afternoon Evening Bedtime As Needed    amLODIPine 10 MG tablet   Commonly known as:  NORVASC   Take 1 tablet (10 mg) by mouth daily                                BLOOD GLUCOSE TEST STRIPS Strp   1 strip by Lancet route 4 times daily                                cholecalciferol 1000 UNIT tablet   Commonly known as:  vitamin D3   Take 1 tablet (1,000 Units) by mouth daily                                 CIALIS 5 MG tablet   Take 5 mg by mouth as needed   Generic drug:  tadalafil                                cyclobenzaprine 10 MG tablet   Commonly known as:  FLEXERIL   Take 1 tablet (10 mg) by mouth 3 times daily as needed for muscle spasms                                furosemide 40 MG tablet   Commonly known as:  LASIX   Take 1 tablet (40 mg) by mouth 2 times daily                                GABAPENTIN PO   Take 300 mg by mouth 3 times daily                                insulin glargine 100 UNIT/ML injection   Commonly known as:  LANTUS   Inject 50 Units Subcutaneous every morning                                insulin pen needle 32G X 4 MM   Commonly known as:  BD ENE U/F   Use 1 pen needles daily or as directed.                                linagliptin 5 MG Tabs tablet   Commonly known as:  TRADJENTA   Take 1 tablet (5 mg) by mouth daily                                losartan 25 MG tablet   Commonly known as:  COZAAR   Take 1 tablet (25 mg) by mouth daily                                metoprolol succinate 50 MG 24 hr tablet   Commonly known as:  TOPROL-XL   Take 1 tablet (50 mg) by mouth daily                                Multi-vitamin Tabs tablet   Take 1 tablet by mouth every morning                                mycophenolic acid 360 MG EC tablet   Commonly known as:  GENERIC EQUIVALENT   Take 2 tablets (720 mg) by mouth every 12 hours                                omeprazole 20 MG CR capsule   Commonly known as:  priLOSEC   Take 1 capsule (20 mg) by mouth 2 times daily                                predniSONE 2.5 MG tablet   Commonly known as:  DELTASONE   Take 1 tablet (2.5 mg) by mouth daily                                tacrolimus 1 MG capsule   Commonly known as:  GENERIC EQUIVALENT   Take 1 capsule (1 mg) by mouth every 12 hours

## 2018-06-04 ENCOUNTER — TELEPHONE (OUTPATIENT)
Dept: TRANSPLANT | Facility: CLINIC | Age: 53
End: 2018-06-04

## 2018-06-04 DIAGNOSIS — Z94.4 LIVER TRANSPLANT RECIPIENT (H): ICD-10-CM

## 2018-06-04 DIAGNOSIS — Z94.4 LIVER REPLACED BY TRANSPLANT (H): Primary | ICD-10-CM

## 2018-06-04 LAB — COPATH REPORT: NORMAL

## 2018-06-04 RX ORDER — TACROLIMUS 1 MG/1
3 CAPSULE ORAL EVERY 12 HOURS
Qty: 180 CAPSULE | Refills: 11 | Status: SHIPPED | OUTPATIENT
Start: 2018-06-04 | End: 2018-06-11

## 2018-06-04 NOTE — TELEPHONE ENCOUNTER
Please call Camacho # [phone] he was wondering if he can have urine protein (random urine for protein added to his lab order?)  He is going in for blood draw tomorrow 06/05/18.

## 2018-06-04 NOTE — TELEPHONE ENCOUNTER
Liver biopsy results reviewed with ,  Mild acute cellular rejection.   would avoid steroid infusions, increase tacrolimus to  12hour goal 5-7, labs twice a week.    Phone call to Camacho, message left with above information, request for call back to confirm dose change of tacrolimus.

## 2018-06-04 NOTE — TELEPHONE ENCOUNTER
Spoke with Camacho about results, with plan to increase dose of tacrolimus to 3mg Q 12 hours, and increase lab frequency to 2x/week.     Camacho states he uses [facility] in [place] for his tacrolimus.

## 2018-06-05 ENCOUNTER — HOSPITAL ENCOUNTER (OUTPATIENT)
Dept: LAB | Facility: CLINIC | Age: 53
Discharge: HOME | End: 2018-06-05
Attending: INTERNAL MEDICINE | Admitting: INTERNAL MEDICINE
Payer: COMMERCIAL

## 2018-06-05 DIAGNOSIS — Z94.4 LIVER REPLACED BY TRANSPLANT (H): ICD-10-CM

## 2018-06-05 DIAGNOSIS — N18.30 CKD (CHRONIC KIDNEY DISEASE) STAGE 3, GFR 30-59 ML/MIN (H): ICD-10-CM

## 2018-06-05 DIAGNOSIS — Z94.4 LIVER TRANSPLANT RECIPIENT (H): ICD-10-CM

## 2018-06-05 DIAGNOSIS — Z79.60 LONG-TERM USE OF IMMUNOSUPPRESSANT MEDICATION: ICD-10-CM

## 2018-06-05 DIAGNOSIS — Z94.4 HISTORY OF LIVER TRANSPLANT (H): ICD-10-CM

## 2018-06-05 LAB
ALBUMIN SERPL-MCNC: 3.1 G/DL (ref 3.4–5)
ALP SERPL-CCNC: 605 U/L (ref 40–150)
ALT SERPL W P-5'-P-CCNC: 85 U/L (ref 0–70)
ANION GAP SERPL CALCULATED.3IONS-SCNC: 5 MMOL/L (ref 3–14)
AST SERPL W P-5'-P-CCNC: 91 U/L (ref 0–45)
BILIRUB DIRECT SERPL-MCNC: 0.4 MG/DL (ref 0–0.2)
BILIRUB SERPL-MCNC: 0.8 MG/DL (ref 0.2–1.3)
BUN SERPL-MCNC: 32 MG/DL (ref 7–30)
CALCIUM SERPL-MCNC: 8.4 MG/DL (ref 8.5–10.1)
CHLORIDE SERPL-SCNC: 111 MMOL/L (ref 94–109)
CO2 SERPL-SCNC: 25 MMOL/L (ref 20–32)
CREAT SERPL-MCNC: 1.08 MG/DL (ref 0.66–1.25)
CREAT UR-MCNC: 77 MG/DL
ERYTHROCYTE [DISTWIDTH] IN BLOOD BY AUTOMATED COUNT: 15.6 % (ref 10–15)
GFR SERPL CREATININE-BSD FRML MDRD: 71 ML/MIN/1.7M2
GLUCOSE SERPL-MCNC: 163 MG/DL (ref 70–99)
HCT VFR BLD AUTO: 32.4 % (ref 40–53)
HGB BLD-MCNC: 10.5 G/DL (ref 13.3–17.7)
MAGNESIUM SERPL-MCNC: 2 MG/DL (ref 1.6–2.3)
MCH RBC QN AUTO: 31.1 PG (ref 26.5–33)
MCHC RBC AUTO-ENTMCNC: 32.4 G/DL (ref 31.5–36.5)
MCV RBC AUTO: 96 FL (ref 78–100)
PHOSPHATE SERPL-MCNC: 2.8 MG/DL (ref 2.5–4.5)
PLATELET # BLD AUTO: 76 10E9/L (ref 150–450)
POTASSIUM SERPL-SCNC: 5.2 MMOL/L (ref 3.4–5.3)
PROT SERPL-MCNC: 6.8 G/DL (ref 6.8–8.8)
PROT UR-MCNC: 0.94 G/L
PROT/CREAT 24H UR: 1.23 G/G CR (ref 0–0.2)
RBC # BLD AUTO: 3.38 10E12/L (ref 4.4–5.9)
SODIUM SERPL-SCNC: 141 MMOL/L (ref 133–144)
TACROLIMUS BLD-MCNC: <3 UG/L (ref 5–15)
TME LAST DOSE: ABNORMAL H
WBC # BLD AUTO: 3.4 10E9/L (ref 4–11)

## 2018-06-05 PROCEDURE — 36415 COLL VENOUS BLD VENIPUNCTURE: CPT | Performed by: INTERNAL MEDICINE

## 2018-06-05 PROCEDURE — 84075 ASSAY ALKALINE PHOSPHATASE: CPT

## 2018-06-05 PROCEDURE — 84460 ALANINE AMINO (ALT) (SGPT): CPT

## 2018-06-05 PROCEDURE — 80069 RENAL FUNCTION PANEL: CPT

## 2018-06-05 PROCEDURE — 85027 COMPLETE CBC AUTOMATED: CPT

## 2018-06-05 PROCEDURE — 84156 ASSAY OF PROTEIN URINE: CPT | Performed by: INTERNAL MEDICINE

## 2018-06-05 PROCEDURE — 36415 COLL VENOUS BLD VENIPUNCTURE: CPT

## 2018-06-05 PROCEDURE — 84450 TRANSFERASE (AST) (SGOT): CPT

## 2018-06-05 PROCEDURE — 83735 ASSAY OF MAGNESIUM: CPT

## 2018-06-05 PROCEDURE — 82248 BILIRUBIN DIRECT: CPT

## 2018-06-05 PROCEDURE — 80197 ASSAY OF TACROLIMUS: CPT | Performed by: INTERNAL MEDICINE

## 2018-06-05 PROCEDURE — 84155 ASSAY OF PROTEIN SERUM: CPT

## 2018-06-05 PROCEDURE — 82247 BILIRUBIN TOTAL: CPT

## 2018-06-06 ENCOUNTER — TELEPHONE (OUTPATIENT)
Dept: NEPHROLOGY | Facility: CLINIC | Age: 53
End: 2018-06-06

## 2018-06-06 ENCOUNTER — OFFICE VISIT (OUTPATIENT)
Dept: GASTROENTEROLOGY | Facility: CLINIC | Age: 53
End: 2018-06-06
Attending: INTERNAL MEDICINE
Payer: COMMERCIAL

## 2018-06-06 VITALS
HEIGHT: 72 IN | SYSTOLIC BLOOD PRESSURE: 161 MMHG | WEIGHT: 221.7 LBS | RESPIRATION RATE: 16 BRPM | DIASTOLIC BLOOD PRESSURE: 83 MMHG | HEART RATE: 70 BPM | TEMPERATURE: 98.5 F | BODY MASS INDEX: 30.03 KG/M2 | OXYGEN SATURATION: 98 %

## 2018-06-06 DIAGNOSIS — N13.30 HYDRONEPHROSIS: Primary | ICD-10-CM

## 2018-06-06 DIAGNOSIS — Z94.4 LIVER REPLACED BY TRANSPLANT (H): Primary | ICD-10-CM

## 2018-06-06 PROCEDURE — G0463 HOSPITAL OUTPT CLINIC VISIT: HCPCS | Mod: ZF

## 2018-06-06 ASSESSMENT — PAIN SCALES - GENERAL: PAINLEVEL: NO PAIN (0)

## 2018-06-06 NOTE — TELEPHONE ENCOUNTER
Order has been placed. Discussed with patient. Transportation has been an issue. Patient will call scheduling line (number provided) and will call Nephrology clinic with questions or concerns. N  Nephrology  [phone]

## 2018-06-06 NOTE — TELEPHONE ENCOUNTER
----- Message from Jael Rubio MD sent at 6/4/2018  5:38 PM CDT -----  Regarding: RE: results of U/S  Thanks  I wonder if the hydronephrosis needs to be addressed  I will have him get a renogram and go from there  Cele, can you ask him to get this done, I want to see if there is any obstruction on that side    Thanks  Jael Rubio    ----- Message -----     From: Abril Doty RN     Sent: 6/4/2018   4:25 PM       To: Cinda Cazares NP, #  Subject: results of U/S                                   Camacho had an U/S abd for elevation in LFT's on 5/29.and the following result was also detailed in the report:  Camacho asking if you could review and advise if he needs further f/u. tks     Impression:   1.  Mildly coarsened hepatic parenchymal echotexture without focal  mass. No morphologic changes of hepatic cirrhosis.  2.  Marked splenomegaly.  3.  Patent transplant hepatic vasculature.  4.  Moderate hydronephrosis which does not appear significantly  changed compared to MRI dated 4/30/2018.

## 2018-06-06 NOTE — LETTER
6/6/2018       RE: Camacho Bhagat  [address] [facility] [address]     Dear Colleague,    Thank you for referring your patient, Camacho Bhagat, to the [facility] HEPATOLOGY at [facility]. Please see a copy of my visit note below.    [facility]    Hepatology follow-up    Follow-up visit for liver transplant    Subjective:  52 year old male s/p liver transplant due to liver carcinoma and CASTAÑEDA, hypertension, sleep apnea, history of smoking, anemia, thrombocytopenia, history of DVT, rheumatoid arthritis, OA, low back pain and diabetes who presents today for follow up for liver transplant.     Patient denies jaundice, lower extremity edema, abdominal distension, lethargy or confusion. He also denies melena, hematemesis or hematochezia. Patient denies fevers, sweats or chills.  Weight stable.    Mr. Bhagat recently had MRCP on 04/30 which revealed patchy and nodular areas of edema and liver enhancement concerning for cholangitis. This was followed by  ERCP on 05/22/18 revealed benign distal biliary stenosis which did not resolved with sphincterotomy, no biliary stent was placed due to excellent drainage however a prophylactic pancreatic stent was placed. Liver biopsy done on 06/1/18 which revealed mild acute rejection, and his dose of tacrolimus was increased to 3 mg BID from 1 mg BID.     His ultrasound recently revealed moderate hydronephrosis and he will follow up with his nephrologist for a lasix Venogram.      Medical hx Surgical hx   Past Medical History:   Diagnosis Date     Depressive disorder, not elsewhere classified      Diabetes type 2, controlled (H) 11/10/2016     Esophageal reflux      Fibromyalgia 1/2009     Gangrene of finger (H) 8/25/2017     H/O deep venous thrombosis 11/2001     H/O CASTAÑEDA (nonalcoholic steatohepatitis)      H/O Pneumonia, organism unspecified(486) 10/2001     H/O: HTN (hypertension) 11/2001     History of CVA  (cerebrovascular accident)      History of hepatocellular carcinoma      History of liver transplant (H)      History of obstructive sleep apnea      HLD (hyperlipidemia)      Ischemia of both lower extremities 8/25/2017     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     Osteoarthritis      Presence of PERMANENT IVC filter      Rheumatoid arthritis(714.0)       Past Surgical History:   Procedure Laterality Date     BENCH LIVER N/A 3/4/2017    Procedure: BENCH LIVER;  Surgeon: Jovan Tran MD;  [facility]: [facility] OR     C TOTAL KNEE ARTHROPLASTY  2008    Right knee arthroscopy     CHOLECYSTECTOMY       COLONOSCOPY N/A 7/21/2017    Procedure: COMBINED COLONOSCOPY, SINGLE OR MULTIPLE BIOPSY/POLYPECTOMY BY BIOPSY;  Colonoscopy;  Surgeon: Izaiah Montes MD;  [facility]: [facility] GI     ENDOSCOPIC RETROGRADE CHOLANGIOPANCREATOGRAM N/A 5/22/2018    Procedure: COMBINED ENDOSCOPIC RETROGRADE CHOLANGIOPANCREATOGRAPHY, PLACE TUBE/STENT;  Endoscopic Retrograde Cholangiopancreatography with sphincterotomy and pancreatic duct stent placement;  Surgeon: Zay Gaitan MD;  Location: [facility] OR     ENT SURGERY      Repair of deviated septum     ESOPHAGOSCOPY, GASTROSCOPY, DUODENOSCOPY (EGD), COMBINED N/A 8/4/2016    Procedure: COMBINED ESOPHAGOSCOPY, GASTROSCOPY, DUODENOSCOPY (EGD), BIOPSY SINGLE OR MULTIPLE;  Surgeon: Trent Pederson MD;  Location:  GI     ESOPHAGOSCOPY, GASTROSCOPY, DUODENOSCOPY (EGD), COMBINED N/A 8/27/2017    Procedure: COMBINED ESOPHAGOSCOPY, GASTROSCOPY, DUODENOSCOPY (EGD);;  Surgeon: Los Wynn MD;  [facility]     INCISION AND DRAINAGE ABDOMEN WASHOUT, COMBINED Right 2/14/2018    Procedure: COMBINED INCISION AND DRAINAGE ABDOMEN WASHOUT;  COMBINED INCISION AND DRAINAGE right ABDOMEN flank;  Surgeon: Sara Dinh MD;  [facility]     LAPAROTOMY EXPLORATORY N/A 7/4/2017    Procedure: LAPAROTOMY EXPLORATORY;  Exploratory Laparotomy, washout;  Surgeon: Tip Zhang MD;   [facility]     LAPAROTOMY EXPLORATORY N/A 2017    Procedure: LAPAROTOMY EXPLORATORY;  Exploratory Laparotomy, Washout with closure.;  Surgeon: Tip Zhang MD;  [facility]     LAPAROTOMY EXPLORATORY N/A 2017    Procedure: LAPAROTOMY EXPLORATORY;  Exploratory Laparotomy, Right angelique-colectomy, end ileostomy, mucosal fistula, partial omentectomy;  Surgeon: Sara Dinh MD;  [facility]     ORTHOPEDIC SURGERY Right     Repair of dislocated wrist.       RELEASE TRIGGER FINGER Right 11/10/2017    Procedure: RELEASE TRIGGER FINGER;  Debride, V-Y Flap Right Index Finger;  Surgeon: Momo Noonan MD;  [facility]     TAKEDOWN ILEOSTOMY N/A 2018    Procedure: TAKEDOWN ILEOSTOMY;  Exploratory Laparotomy, Lysis of Adhesions, Takedown Of End Ileostomy, Takedown of mucocutaneous fistula, ileocolic resection  And Colorectal Anastomosis, Primary repair of Ventral Hernia, Anesthesia Block ;  Surgeon: Sara Dinh MD;  [facility]     TRACHEOSTOMY  2001    During hospitalization for pneumonia.       TRANSPLANT LIVER RECIPIENT  DONOR N/A 3/4/2017    Procedure: TRANSPLANT LIVER RECIPIENT  DONOR;  Surgeon: Jovan Tran MD;  [facility]          Medications  Prior to Admission medications    Medication Sig Start Date End Date Taking? Authorizing Provider   amLODIPine (NORVASC) 10 MG tablet Take 1 tablet (10 mg) by mouth daily  Patient taking differently: Take 10 mg by mouth every morning  17  Yes Ninfa Hernández MD   cholecalciferol (VITAMIN D3) 1000 UNIT tablet Take 1 tablet (1,000 Units) by mouth daily  Patient taking differently: Take 1,000 Units by mouth every morning  17  Yes Cinda Cazares NP   CIALIS 5 MG tablet Take 5 mg by mouth as needed  17  Yes Reported, Patient   cyclobenzaprine (FLEXERIL) 10 MG tablet Take 1 tablet (10 mg) by mouth 3 times daily as needed for muscle spasms  Patient taking  differently: Take 10 mg by mouth as needed for muscle spasms  9/8/17  Yes Felicia Tolliver APRN CNP   furosemide (LASIX) 40 MG tablet Take 1 tablet (40 mg) by mouth 2 times daily 4/4/18  Yes Jael Rubio MD   GABAPENTIN PO Take 300 mg by mouth 3 times daily   Yes Reported, Patient   Glucose Blood (BLOOD GLUCOSE TEST STRIPS) STRP 1 strip by Lancet route 4 times daily 4/11/18  Yes Alisa West MD   insulin glargine (LANTUS) 100 UNIT/ML injection Inject 50 Units Subcutaneous every morning 4/11/18  Yes Alisa West MD   insulin pen needle (BD ENE U/F) 32G X 4 MM Use 1 pen needles daily or as directed. 4/11/18  Yes Alisa West MD   linagliptin (TRADJENTA) 5 MG TABS tablet Take 1 tablet (5 mg) by mouth daily  Patient taking differently: Take 5 mg by mouth every morning  4/11/18  Yes Alisa West MD   losartan (COZAAR) 25 MG tablet Take 1 tablet (25 mg) by mouth daily  Patient taking differently: Take 25 mg by mouth every morning  4/4/18  Yes Jael Rubio MD   metoprolol succinate (TOPROL-XL) 50 MG 24 hr tablet Take 1 tablet (50 mg) by mouth daily  Patient taking differently: Take 50 mg by mouth every morning  4/4/18  Yes Jael Rubio MD   multivitamin, therapeutic with minerals (MULTI-VITAMIN) TABS tablet Take 1 tablet by mouth every morning   Yes Reported, Patient   mycophenolic acid (GENERIC EQUIVALENT) 360 MG EC tablet Take 2 tablets (720 mg) by mouth every 12 hours  Patient taking differently: Take 720 mg by mouth 2 times daily  11/20/17  Yes Jovan Tran MD   omeprazole (PRILOSEC) 20 MG CR capsule Take 1 capsule (20 mg) by mouth 2 times daily 5/7/18  Yes Toñito Camejo MD   predniSONE (DELTASONE) 2.5 MG tablet Take 1 tablet (2.5 mg) by mouth daily  Patient taking differently: Take 2.5 mg by mouth every morning  2/5/18  Yes Jovan Tran MD   tacrolimus (GENERIC EQUIVALENT) 1 MG capsule Take 3 capsules (3 mg) by  "mouth every 12 hours 6/4/18  Yes Tip Zhang MD       Allergies  Allergies   Allergen Reactions     Erythromycin GI Disturbance     Vioxx      Nausea, vomiting       Review of systems  A 10-point review of systems was negative    Examination  /83 (BP Location: Left arm, Patient Position: Sitting, Cuff Size: Adult Regular)  Pulse 70  Temp 98.5  F (36.9  C) (Oral)  Resp 16  Ht 1.829 m (6' 0.01\")  Wt 100.6 kg (221 lb 11.2 oz)  SpO2 98%  BMI 30.06 kg/m2  Body mass index is 30.06 kg/(m^2).    Gen- well, NAD, A+Ox3, normal color  Lym- no palpable LAD  CVS- RRR  RS- CTA  Abd- soft, non tender, non distended, bowel sounds audible.   Extr- hands normal, no DESMOND  Skin- no rash or jaundice  Neuro- no asterixis  Psych- normal mood    Laboratory  Lab Results   Component Value Date     06/05/2018    POTASSIUM 5.2 06/05/2018    CHLORIDE 111 06/05/2018    CO2 25 06/05/2018    BUN 32 06/05/2018    CR 1.08 06/05/2018       Lab Results   Component Value Date    BILITOTAL 0.8 06/05/2018    ALT 85 06/05/2018    AST 91 06/05/2018    ALKPHOS 605 06/05/2018       Lab Results   Component Value Date    ALBUMIN 3.1 06/05/2018    PROTTOTAL 6.8 06/05/2018        Lab Results   Component Value Date    WBC 3.4 06/05/2018    HGB 10.5 06/05/2018    MCV 96 06/05/2018    PLT 76 06/05/2018       Lab Results   Component Value Date    INR 0.89 06/01/2018       Radiology    Assessment and plan  52 year old male  s/p liver transplant due to liver carcinoma and CASTAÑEDA who recently had liver biopsy which revealed mild acute rejection. Currently he is asymptomatic and his liver function tests reveal an improving trend.     Tacrolimus was recently increased to 3 mg twice daily from 1 mg daily. Patients recent tacrolimus level of < 3.0 does not reflect the new dose changes. He will have a repeat level drawn on Friday and further changes in tacrolimus dose will be based on that. Continue prednisone at the same dose of 2.5 mg.   He will " follow up in 3 months.       Nilay Euceda MD   Resident  Hepatology  [facility]    The patient was seen and examined.  The above assessment and plan was developed jointly with the resident.      Toñito Camejo MD      Professor of Medicine  [employer]      Executive Medical Director, Solid Organ Transplant Program  [facility]

## 2018-06-06 NOTE — PATIENT INSTRUCTIONS
Preventive Care:    Diabetic Eye Exam Screening: During our visit today, we discussed that it is recommended you receive diabetic eye exam screening. Please call or make an appointment with your primary care provider to discuss this with them. You may also call the [facility] scheduling line ([phone]) to set up an eye exam at one of the [facility] Clinics.        Please follow up with your tacrolimus level testing on Friday.   Please schedule your lasix renogram for moderate hydronephrosis.

## 2018-06-06 NOTE — LETTER
6/6/2018      RE: Camacho Bhagat  [address]  [facility] [address]       [facility]    Hepatology follow-up    Follow-up visit for liver transplant    Subjective:  52 year old male s/p liver transplant due to liver carcinoma and CASTAÑEDA, hypertension, sleep apnea, history of smoking, anemia, thrombocytopenia, history of DVT, rheumatoid arthritis, OA, low back pain and diabetes who presents today for follow up for liver transplant.     Patient denies jaundice, lower extremity edema, abdominal distension, lethargy or confusion. He also denies melena, hematemesis or hematochezia. Patient denies fevers, sweats or chills.  Weight stable.    Mr. Bhagat recently had MRCP on 04/30 which revealed patchy and nodular areas of edema and liver enhancement concerning for cholangitis. This was followed by  ERCP on 05/22/18 revealed benign distal biliary stenosis which did not resolved with sphincterotomy, no biliary stent was placed due to excellent drainage however a prophylactic pancreatic stent was placed. Liver biopsy done on 06/1/18 which revealed mild acute rejection, and his dose of tacrolimus was increased to 3 mg BID from 1 mg BID.     His ultrasound recently revealed moderate hydronephrosis and he will follow up with his nephrologist for a lasix Venogram.      Medical hx Surgical hx   Past Medical History:   Diagnosis Date     Depressive disorder, not elsewhere classified      Diabetes type 2, controlled (H) 11/10/2016     Esophageal reflux      Fibromyalgia 1/2009     Gangrene of finger (H) 8/25/2017     H/O deep venous thrombosis 11/2001     H/O CASTAÑEDA (nonalcoholic steatohepatitis)      H/O Pneumonia, organism unspecified(486) 10/2001     H/O: HTN (hypertension) 11/2001     History of CVA (cerebrovascular accident)      History of hepatocellular carcinoma      History of liver transplant (H)      History of obstructive sleep apnea      HLD (hyperlipidemia)      Ischemia of both lower  extremities 8/25/2017     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     Osteoarthritis      Presence of PERMANENT IVC filter      Rheumatoid arthritis(714.0)       Past Surgical History:   Procedure Laterality Date     BENCH LIVER N/A 3/4/2017    Procedure: BENCH LIVER;  Surgeon: Jovan Tran MD;  [facility]     C TOTAL KNEE ARTHROPLASTY  2008    Right knee arthroscopy     CHOLECYSTECTOMY       COLONOSCOPY N/A 7/21/2017    Procedure: COMBINED COLONOSCOPY, SINGLE OR MULTIPLE BIOPSY/POLYPECTOMY BY BIOPSY;  Colonoscopy;  Surgeon: Izaiah Montes MD;  Location:  GI     ENDOSCOPIC RETROGRADE CHOLANGIOPANCREATOGRAM N/A 5/22/2018    Procedure: COMBINED ENDOSCOPIC RETROGRADE CHOLANGIOPANCREATOGRAPHY, PLACE TUBE/STENT;  Endoscopic Retrograde Cholangiopancreatography with sphincterotomy and pancreatic duct stent placement;  Surgeon: Zay Gaitan MD;  [facility]: [facility] OR     ENT SURGERY      Repair of deviated septum     ESOPHAGOSCOPY, GASTROSCOPY, DUODENOSCOPY (EGD), COMBINED N/A 8/4/2016    Procedure: COMBINED ESOPHAGOSCOPY, GASTROSCOPY, DUODENOSCOPY (EGD), BIOPSY SINGLE OR MULTIPLE;  Surgeon: Trent Pederson MD;  Location:  GI     ESOPHAGOSCOPY, GASTROSCOPY, DUODENOSCOPY (EGD), COMBINED N/A 8/27/2017    Procedure: COMBINED ESOPHAGOSCOPY, GASTROSCOPY, DUODENOSCOPY (EGD);;  Surgeon: Los Wynn MD;  Location:  GI     INCISION AND DRAINAGE ABDOMEN WASHOUT, COMBINED Right 2/14/2018    Procedure: COMBINED INCISION AND DRAINAGE ABDOMEN WASHOUT;  COMBINED INCISION AND DRAINAGE right ABDOMEN flank;  Surgeon: Sara Dinh MD;  [facility]     LAPAROTOMY EXPLORATORY N/A 7/4/2017    Procedure: LAPAROTOMY EXPLORATORY;  Exploratory Laparotomy, washout;  Surgeon: Tip Zhang MD;  [facility]     LAPAROTOMY EXPLORATORY N/A 7/5/2017    Procedure: LAPAROTOMY EXPLORATORY;  Exploratory Laparotomy, Washout with closure.;  Surgeon: Tip Zhang MD;  [facility]      LAPAROTOMY EXPLORATORY N/A 2017    Procedure: LAPAROTOMY EXPLORATORY;  Exploratory Laparotomy, Right angelique-colectomy, end ileostomy, mucosal fistula, partial omentectomy;  Surgeon: Sara Dinh MD;  [facility]     ORTHOPEDIC SURGERY Right     Repair of dislocated wrist.       RELEASE TRIGGER FINGER Right 11/10/2017    Procedure: RELEASE TRIGGER FINGER;  Debride, V-Y Flap Right Index Finger;  Surgeon: Momo Noonan MD;  [facility]     TAKEDOWN ILEOSTOMY N/A 2018    Procedure: TAKEDOWN ILEOSTOMY;  Exploratory Laparotomy, Lysis of Adhesions, Takedown Of End Ileostomy, Takedown of mucocutaneous fistula, ileocolic resection  And Colorectal Anastomosis, Primary repair of Ventral Hernia, Anesthesia Block ;  Surgeon: Sara Dinh MD;  [facility]     TRACHEOSTOMY  2001    During hospitalization for pneumonia.       TRANSPLANT LIVER RECIPIENT  DONOR N/A 3/4/2017    Procedure: TRANSPLANT LIVER RECIPIENT  DONOR;  Surgeon: Jovan Tran MD;  [facility]          Medications  Prior to Admission medications    Medication Sig Start Date End Date Taking? Authorizing Provider   amLODIPine (NORVASC) 10 MG tablet Take 1 tablet (10 mg) by mouth daily  Patient taking differently: Take 10 mg by mouth every morning  17  Yes Ninfa Hernández MD   cholecalciferol (VITAMIN D3) 1000 UNIT tablet Take 1 tablet (1,000 Units) by mouth daily  Patient taking differently: Take 1,000 Units by mouth every morning  17  Yes DeboraCinda pena NP   CIALIS 5 MG tablet Take 5 mg by mouth as needed  17  Yes Reported, Patient   cyclobenzaprine (FLEXERIL) 10 MG tablet Take 1 tablet (10 mg) by mouth 3 times daily as needed for muscle spasms  Patient taking differently: Take 10 mg by mouth as needed for muscle spasms  17  Yes Felicia Tolliver APRN CNP   furosemide (LASIX) 40 MG tablet Take 1 tablet (40 mg) by mouth 2 times daily 18  Yes Joanne  Jael Boo MD   GABAPENTIN PO Take 300 mg by mouth 3 times daily   Yes Reported, Patient   Glucose Blood (BLOOD GLUCOSE TEST STRIPS) STRP 1 strip by Lancet route 4 times daily 4/11/18  Yes Alisa West MD   insulin glargine (LANTUS) 100 UNIT/ML injection Inject 50 Units Subcutaneous every morning 4/11/18  Yes Alisa West MD   insulin pen needle (BD ENE U/F) 32G X 4 MM Use 1 pen needles daily or as directed. 4/11/18  Yes Alisa West MD   linagliptin (TRADJENTA) 5 MG TABS tablet Take 1 tablet (5 mg) by mouth daily  Patient taking differently: Take 5 mg by mouth every morning  4/11/18  Yes Alisa West MD   losartan (COZAAR) 25 MG tablet Take 1 tablet (25 mg) by mouth daily  Patient taking differently: Take 25 mg by mouth every morning  4/4/18  Yes Jael Rubio MD   metoprolol succinate (TOPROL-XL) 50 MG 24 hr tablet Take 1 tablet (50 mg) by mouth daily  Patient taking differently: Take 50 mg by mouth every morning  4/4/18  Yes Jael Rubio MD   multivitamin, therapeutic with minerals (MULTI-VITAMIN) TABS tablet Take 1 tablet by mouth every morning   Yes Reported, Patient   mycophenolic acid (GENERIC EQUIVALENT) 360 MG EC tablet Take 2 tablets (720 mg) by mouth every 12 hours  Patient taking differently: Take 720 mg by mouth 2 times daily  11/20/17  Yes Jovan Tran MD   omeprazole (PRILOSEC) 20 MG CR capsule Take 1 capsule (20 mg) by mouth 2 times daily 5/7/18  Yes Toñito Camejo MD   predniSONE (DELTASONE) 2.5 MG tablet Take 1 tablet (2.5 mg) by mouth daily  Patient taking differently: Take 2.5 mg by mouth every morning  2/5/18  Yes Jovan Tran MD   tacrolimus (GENERIC EQUIVALENT) 1 MG capsule Take 3 capsules (3 mg) by mouth every 12 hours 6/4/18  Yes Tip Zhang MD       Allergies  Allergies   Allergen Reactions     Erythromycin GI Disturbance     Vioxx      Nausea, vomiting       Review of systems  A  "10-point review of systems was negative    Examination  /83 (BP Location: Left arm, Patient Position: Sitting, Cuff Size: Adult Regular)  Pulse 70  Temp 98.5  F (36.9  C) (Oral)  Resp 16  Ht 1.829 m (6' 0.01\")  Wt 100.6 kg (221 lb 11.2 oz)  SpO2 98%  BMI 30.06 kg/m2  Body mass index is 30.06 kg/(m^2).    Gen- well, NAD, A+Ox3, normal color  Lym- no palpable LAD  CVS- RRR  RS- CTA  Abd- soft, non tender, non distended, bowel sounds audible.   Extr- hands normal, no DESMOND  Skin- no rash or jaundice  Neuro- no asterixis  Psych- normal mood    Laboratory  Lab Results   Component Value Date     06/05/2018    POTASSIUM 5.2 06/05/2018    CHLORIDE 111 06/05/2018    CO2 25 06/05/2018    BUN 32 06/05/2018    CR 1.08 06/05/2018       Lab Results   Component Value Date    BILITOTAL 0.8 06/05/2018    ALT 85 06/05/2018    AST 91 06/05/2018    ALKPHOS 605 06/05/2018       Lab Results   Component Value Date    ALBUMIN 3.1 06/05/2018    PROTTOTAL 6.8 06/05/2018        Lab Results   Component Value Date    WBC 3.4 06/05/2018    HGB 10.5 06/05/2018    MCV 96 06/05/2018    PLT 76 06/05/2018       Lab Results   Component Value Date    INR 0.89 06/01/2018       Radiology    Assessment and plan  52 year old male  s/p liver transplant due to liver carcinoma and CASTAÑEDA who recently had liver biopsy which revealed mild acute rejection. Currently he is asymptomatic and his liver function tests reveal an improving trend.     Tacrolimus was recently increased to 3 mg twice daily from 1 mg daily. Patients recent tacrolimus level of < 3.0 does not reflect the new dose changes. He will have a repeat level drawn on Friday and further changes in tacrolimus dose will be based on that. Continue prednisone at the same dose of 2.5 mg.   He will follow up in 3 months.       Nilay Euceda MD   Resident  Hepatology  [facility]    The patient was seen and examined.  The above assessment and plan was developed " jointly with the resident.      Toñito Camejo MD      Professor of Medicine  [employer]      Executive Medical Director, Solid Organ Transplant Program  [facility]

## 2018-06-06 NOTE — MR AVS SNAPSHOT
After Visit Summary   6/6/2018    Camacho Bhagat    MRN: [id number]           Patient Information     Date Of Birth          1965        Visit Information        Provider Department      6/6/2018 12:45 PM Toñito Camejo MD [facility] Hepatology        Today's Diagnoses     Liver replaced by transplant (H)    -  1      Care Instructions    Preventive Care:    Diabetic Eye Exam Screening: During our visit today, we discussed that it is recommended you receive diabetic eye exam screening. Please call or make an appointment with your primary care provider to discuss this with them. You may also call the [facility] scheduling line ([phone]) to set up an eye exam at one of the [facility] Clinics.        Please follow up with your tacrolimus level testing on Friday.   Please schedule your lasix renogram for moderate hydronephrosis.           Follow-ups after your visit        Follow-up notes from your care team     Return in about 3 months (around 9/6/2018).      Your next 10 appointments already scheduled     Jun 08, 2018 11:15 AM CDT   XR ABDOMEN 2 VIEWS with UCXR1   [facility] ([facility])    [address] [address] [phone]           Please bring a list of your current medicines to your exam. (Include vitamins, minerals and over-thecounter medicines.) Leave your valuables at home.  Tell your doctor if there is a chance you may be pregnant.  You do not need to do anything special for this exam.            Jun 12, 2018 12:00 PM CDT   NM RENAL SCAN W FLOW & FUNCTION W LASIX with UUNM2   [facility] [place], Nuclear Medicine ([facility])    [address] [address] [phone]           You may take your normal medicines, unless your doctor tells you not to. Please bring a list of your medicines (including vitamins, minerals and over-the-counter drugs).   Adults may eat and drink as usual.  Please wear comfortable clothes. Leave your valuables at home.  If you are breastfeeding or may be pregnant, tell us before the exam.  Please call your Imaging Department at your exam site with any questions.  For children:   Young children may need medicine to help them relax (called sedation). We will tell you in advance if your child needs this medicine. If so, he or she cannot eat or drink before this test and will need to arrive about 45 minutes early.   If your child will not be sedated, he or she can eat and drink as normal.   We may place a catheter (tube) in the bladder. This tube will drain urine from the body.   A parent or other adult may stay with the child in the exam room.  Please call your Imaging Department at your exam site with any questions.            Aug 15, 2018 11:10 AM CDT   (Arrive by 10:40 AM)   Return Visit with Jael Rubio MD   [facility] Nephrology ([facility])    [address]  [address]  [facility] [id number] [phone]            Aug 20, 2018  9:30 AM CDT   (Arrive by 9:15 AM)   RETURN ENDOCRINE with Alisa West MD   [facility] Endocrinology ([facility])    [address]  3rd Floor  [facility] [id number] [phone]            Sep 05, 2018 12:30 PM CDT   (Arrive by 12:15 PM)   Return Liver Transplant with Toñito Camejo MD   [facility] Hepatology ([facility])    [address] [place] [address]  [address]  [facility] [id number] [phone]              Future tests that were ordered for you today     Open Future Orders        Priority Expected Expires Ordered    NM Renogram with Lasix Routine  6/6/2019 6/6/2018            Who to contact     If you have questions or need follow up information about today's clinic visit or your schedule please contact [facility] HEPATOLOGY directly at [phone].  Normal or non-critical lab and imaging  "results will be communicated to you by MyChart, letter or phone within 4 business days after the clinic has received the results. If you do not hear from us within 7 days, please contact the clinic through [employer] or phone. If you have a critical or abnormal lab result, we will notify you by phone as soon as possible.  Submit refill requests through [employer] or call your pharmacy and they will forward the refill request to us. Please allow 3 business days for your refill to be completed.          Additional Information About Your Visit        [employer] Information     [employer] gives you secure access to your electronic health record. If you see a primary care provider, you can also send messages to your care team and make appointments. If you have questions, please call your primary care clinic.  If you do not have a primary care provider, please call [phone] and they will assist you.        Care EveryWhere ID     This is your Care EveryWhere ID. This could be used by other organizations to access your [facility] medical records  [id number]        Your Vitals Were     Pulse Temperature Respirations Height Pulse Oximetry BMI (Body Mass Index)    70 98.5  F (36.9  C) (Oral) 16 1.829 m (6' 0.01\") 98% 30.06 kg/m2       Blood Pressure from Last 3 Encounters:   06/06/18 161/83   06/01/18 124/78   05/22/18 134/82    Weight from Last 3 Encounters:   06/06/18 100.6 kg (221 lb 11.2 oz)   05/22/18 98.1 kg (216 lb 4.3 oz)   05/18/18 98.5 kg (217 lb 1.6 oz)              Today, you had the following     No orders found for display         Today's Medication Changes          These changes are accurate as of 6/6/18 11:59 PM.  If you have any questions, ask your nurse or doctor.               These medicines have changed or have updated prescriptions.        Dose/Directions    amLODIPine 10 MG tablet   Commonly known as:  NORVASC   This may have changed:  when to take this   Used for:  HTN (hypertension)        Dose:  10 mg   Take " 1 tablet (10 mg) by mouth daily   Quantity:  90 tablet   Refills:  3       cholecalciferol 1000 UNIT tablet   Commonly known as:  vitamin D3   This may have changed:  when to take this   Used for:  Vitamin D deficiency        Dose:  1000 Units   Take 1 tablet (1,000 Units) by mouth daily   Quantity:  100 tablet   Refills:  3       cyclobenzaprine 10 MG tablet   Commonly known as:  FLEXERIL   This may have changed:  when to take this   Used for:  Immunosuppression (H)        Dose:  10 mg   Take 1 tablet (10 mg) by mouth 3 times daily as needed for muscle spasms   Quantity:  90 tablet   Refills:  0       linagliptin 5 MG Tabs tablet   Commonly known as:  TRADJENTA   This may have changed:  when to take this   Used for:  Type 2 diabetes mellitus with diabetic polyneuropathy, with long-term current use of insulin (H)        Dose:  5 mg   Take 1 tablet (5 mg) by mouth daily   Quantity:  90 tablet   Refills:  3       losartan 25 MG tablet   Commonly known as:  COZAAR   This may have changed:  when to take this   Used for:  Persistent proteinuria, Hyperkalemia, Benign essential hypertension        Dose:  25 mg   Take 1 tablet (25 mg) by mouth daily   Quantity:  30 tablet   Refills:  11       metoprolol succinate 50 MG 24 hr tablet   Commonly known as:  TOPROL-XL   This may have changed:  when to take this   Used for:  Benign essential hypertension, Persistent proteinuria, Hyperkalemia        Dose:  50 mg   Take 1 tablet (50 mg) by mouth daily   Quantity:  30 tablet   Refills:  11       mycophenolic acid 360 MG EC tablet   Commonly known as:  GENERIC EQUIVALENT   This may have changed:  when to take this   Used for:  History of liver transplant (H), Long-term use of immunosuppressant medication        Dose:  720 mg   Take 2 tablets (720 mg) by mouth every 12 hours   Quantity:  120 tablet   Refills:  3       predniSONE 2.5 MG tablet   Commonly known as:  DELTASONE   This may have changed:  when to take this   Used for:   Liver replaced by transplant (H), Long-term use of immunosuppressant medication        Dose:  2.5 mg   Take 1 tablet (2.5 mg) by mouth daily   Quantity:  90 tablet   Refills:  3                Primary Care Provider Office Phone # Fax #    Shay Kirkpatrick [phone]       [address] [place] [address]  [facility] [address]        Equal Access to Services     [facility]: Hadii aad ku hadasho Soomaali, waaxda luqadaha, qaybta kaalmada adeegyada, waxay idiin hayaan adeeg khmin laNemesioaan ah. So [facility] [phone].    ATENCIÓN: Si habla español, tiene a zheng disposición servicios gratuitos de asistencia lingüística. Llame al [phone].    We comply with applicable federal civil rights laws and [place] laws. We do not discriminate on the basis of race, color, national origin, age, disability, sex, sexual orientation, or gender identity.            Thank you!     Thank you for choosing [facility] HEPATOLOGY  for your care. Our goal is always to provide you with excellent care. Hearing back from our patients is one way we can continue to improve our services. Please take a few minutes to complete the written survey that you may receive in the mail after your visit with us. Thank you!             Your Updated Medication List - Protect others around you: Learn how to safely use, store and throw away your medicines at www.disposemymeds.org.          This list is accurate as of 6/6/18 11:59 PM.  Always use your most recent med list.                   Brand Name Dispense Instructions for use Diagnosis    amLODIPine 10 MG tablet    NORVASC    90 tablet    Take 1 tablet (10 mg) by mouth daily    HTN (hypertension)       BLOOD GLUCOSE TEST STRIPS Strp     100 each    1 strip by Lancet route 4 times daily    Type 2 diabetes mellitus with diabetic polyneuropathy, with long-term current use of insulin (H)       cholecalciferol 1000 UNIT tablet    vitamin D3    100 tablet    Take 1 tablet (1,000 Units) by mouth daily     Vitamin D deficiency       CIALIS 5 MG tablet   Generic drug:  tadalafil      Take 5 mg by mouth as needed        cyclobenzaprine 10 MG tablet    FLEXERIL    90 tablet    Take 1 tablet (10 mg) by mouth 3 times daily as needed for muscle spasms    Immunosuppression (H)       furosemide 40 MG tablet    LASIX    60 tablet    Take 1 tablet (40 mg) by mouth 2 times daily    Hyperkalemia, Persistent proteinuria, Benign essential hypertension       GABAPENTIN PO      Take 300 mg by mouth 3 times daily        insulin glargine 100 UNIT/ML injection    LANTUS    18 mL    Inject 50 Units Subcutaneous every morning    Type 2 diabetes mellitus with diabetic polyneuropathy, with long-term current use of insulin (H)       insulin pen needle 32G X 4 MM    BD ENE U/F    100 each    Use 1 pen needles daily or as directed.    Type 2 diabetes mellitus with diabetic polyneuropathy, with long-term current use of insulin (H)       linagliptin 5 MG Tabs tablet    TRADJENTA    90 tablet    Take 1 tablet (5 mg) by mouth daily    Type 2 diabetes mellitus with diabetic polyneuropathy, with long-term current use of insulin (H)       losartan 25 MG tablet    COZAAR    30 tablet    Take 1 tablet (25 mg) by mouth daily    Persistent proteinuria, Hyperkalemia, Benign essential hypertension       metoprolol succinate 50 MG 24 hr tablet    TOPROL-XL    30 tablet    Take 1 tablet (50 mg) by mouth daily    Benign essential hypertension, Persistent proteinuria, Hyperkalemia       Multi-vitamin Tabs tablet      Take 1 tablet by mouth every morning        mycophenolic acid 360 MG EC tablet    GENERIC EQUIVALENT    120 tablet    Take 2 tablets (720 mg) by mouth every 12 hours    History of liver transplant (H), Long-term use of immunosuppressant medication       omeprazole 20 MG CR capsule    priLOSEC    60 capsule    Take 1 capsule (20 mg) by mouth 2 times daily    Long-term use of immunosuppressant medication, History of liver transplant (H)        predniSONE 2.5 MG tablet    DELTASONE    90 tablet    Take 1 tablet (2.5 mg) by mouth daily    Liver replaced by transplant (H), Long-term use of immunosuppressant medication       tacrolimus 1 MG capsule    GENERIC EQUIVALENT    180 capsule    Take 3 capsules (3 mg) by mouth every 12 hours    Liver transplant recipient (H)

## 2018-06-06 NOTE — NURSING NOTE
"Chief Complaint   Patient presents with     RECHECK     S/P Liver TX       /83 (BP Location: Left arm, Patient Position: Sitting, Cuff Size: Adult Regular)  Pulse 70  Temp 98.5  F (36.9  C) (Oral)  Resp 16  Ht 1.829 m (6' 0.01\")  Wt 100.6 kg (221 lb 11.2 oz)  SpO2 98%  BMI 30.06 kg/m2    Katie Harmon [facility]  6/6/2018 12:40 PM      "

## 2018-06-06 NOTE — PROGRESS NOTES
[facility]    Hepatology follow-up    Follow-up visit for liver transplant    Subjective:  52 year old male s/p liver transplant due to liver carcinoma and CASTAÑEDA, hypertension, sleep apnea, history of smoking, anemia, thrombocytopenia, history of DVT, rheumatoid arthritis, OA, low back pain and diabetes who presents today for follow up for liver transplant.     Patient denies jaundice, lower extremity edema, abdominal distension, lethargy or confusion. He also denies melena, hematemesis or hematochezia. Patient denies fevers, sweats or chills.  Weight stable.    Mr. Bhagat recently had MRCP on 04/30 which revealed patchy and nodular areas of edema and liver enhancement concerning for cholangitis. This was followed by  ERCP on 05/22/18 revealed benign distal biliary stenosis which did not resolved with sphincterotomy, no biliary stent was placed due to excellent drainage however a prophylactic pancreatic stent was placed. Liver biopsy done on 06/1/18 which revealed mild acute rejection, and his dose of tacrolimus was increased to 3 mg BID from 1 mg BID.     His ultrasound recently revealed moderate hydronephrosis and he will follow up with his nephrologist for a lasix Venogram.      Medical hx Surgical hx   Past Medical History:   Diagnosis Date     Depressive disorder, not elsewhere classified      Diabetes type 2, controlled (H) 11/10/2016     Esophageal reflux      Fibromyalgia 1/2009     Gangrene of finger (H) 8/25/2017     H/O deep venous thrombosis 11/2001     H/O CASTAÑEDA (nonalcoholic steatohepatitis)      H/O Pneumonia, organism unspecified(486) 10/2001     H/O: HTN (hypertension) 11/2001     History of CVA (cerebrovascular accident)      History of hepatocellular carcinoma      History of liver transplant (H)      History of obstructive sleep apnea      HLD (hyperlipidemia)      Ischemia of both lower extremities 8/25/2017     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     Osteoarthritis       Presence of PERMANENT IVC filter      Rheumatoid arthritis(714.0)       Past Surgical History:   Procedure Laterality Date     BENCH LIVER N/A 3/4/2017    Procedure: BENCH LIVER;  Surgeon: Jovan Tran MD;  Location: [facility] OR     C TOTAL KNEE ARTHROPLASTY  2008    Right knee arthroscopy     CHOLECYSTECTOMY       COLONOSCOPY N/A 7/21/2017    Procedure: COMBINED COLONOSCOPY, SINGLE OR MULTIPLE BIOPSY/POLYPECTOMY BY BIOPSY;  Colonoscopy;  Surgeon: Izaiah Montes MD;  Location:  GI     ENDOSCOPIC RETROGRADE CHOLANGIOPANCREATOGRAM N/A 5/22/2018    Procedure: COMBINED ENDOSCOPIC RETROGRADE CHOLANGIOPANCREATOGRAPHY, PLACE TUBE/STENT;  Endoscopic Retrograde Cholangiopancreatography with sphincterotomy and pancreatic duct stent placement;  Surgeon: Zay Gaitan MD;  [facility]     ENT SURGERY      Repair of deviated septum     ESOPHAGOSCOPY, GASTROSCOPY, DUODENOSCOPY (EGD), COMBINED N/A 8/4/2016    Procedure: COMBINED ESOPHAGOSCOPY, GASTROSCOPY, DUODENOSCOPY (EGD), BIOPSY SINGLE OR MULTIPLE;  Surgeon: Trent Pederson MD;  Location:  GI     ESOPHAGOSCOPY, GASTROSCOPY, DUODENOSCOPY (EGD), COMBINED N/A 8/27/2017    Procedure: COMBINED ESOPHAGOSCOPY, GASTROSCOPY, DUODENOSCOPY (EGD);;  Surgeon: Los Wynn MD;  Location:  GI     INCISION AND DRAINAGE ABDOMEN WASHOUT, COMBINED Right 2/14/2018    Procedure: COMBINED INCISION AND DRAINAGE ABDOMEN WASHOUT;  COMBINED INCISION AND DRAINAGE right ABDOMEN flank;  Surgeon: Sara Dinh MD;  [facility]: [facility] OR     LAPAROTOMY EXPLORATORY N/A 7/4/2017    Procedure: LAPAROTOMY EXPLORATORY;  Exploratory Laparotomy, washout;  Surgeon: Tip Zhang MD;  [facility]     LAPAROTOMY EXPLORATORY N/A 7/5/2017    Procedure: LAPAROTOMY EXPLORATORY;  Exploratory Laparotomy, Washout with closure.;  Surgeon: Tip Zhang MD;  [facility]     LAPAROTOMY EXPLORATORY N/A 7/26/2017    Procedure: LAPAROTOMY EXPLORATORY;   Exploratory Laparotomy, Right angelique-colectomy, end ileostomy, mucosal fistula, partial omentectomy;  Surgeon: Sara Dinh MD;  [facility]: [facility] OR     ORTHOPEDIC SURGERY Right     Repair of dislocated wrist.       RELEASE TRIGGER FINGER Right 11/10/2017    Procedure: RELEASE TRIGGER FINGER;  Debride, V-Y Flap Right Index Finger;  Surgeon: Momo Noonan MD;  [facility]     TAKEDOWN ILEOSTOMY N/A 2018    Procedure: TAKEDOWN ILEOSTOMY;  Exploratory Laparotomy, Lysis of Adhesions, Takedown Of End Ileostomy, Takedown of mucocutaneous fistula, ileocolic resection  And Colorectal Anastomosis, Primary repair of Ventral Hernia, Anesthesia Block ;  Surgeon: Sara Dinh MD;  [facility] OR     TRACHEOSTOMY  2001    During hospitalization for pneumonia.       TRANSPLANT LIVER RECIPIENT  DONOR N/A 3/4/2017    Procedure: TRANSPLANT LIVER RECIPIENT  DONOR;  Surgeon: Jovan Tran MD;  Location: [facility] OR          Medications  Prior to Admission medications    Medication Sig Start Date End Date Taking? Authorizing Provider   amLODIPine (NORVASC) 10 MG tablet Take 1 tablet (10 mg) by mouth daily  Patient taking differently: Take 10 mg by mouth every morning  17  Yes Ninfa Hernández MD   cholecalciferol (VITAMIN D3) 1000 UNIT tablet Take 1 tablet (1,000 Units) by mouth daily  Patient taking differently: Take 1,000 Units by mouth every morning  17  Yes Cinda Cazares, NP   CIALIS 5 MG tablet Take 5 mg by mouth as needed  17  Yes Reported, Patient   cyclobenzaprine (FLEXERIL) 10 MG tablet Take 1 tablet (10 mg) by mouth 3 times daily as needed for muscle spasms  Patient taking differently: Take 10 mg by mouth as needed for muscle spasms  17  Yes Felicia Tolliver APRN CNP   furosemide (LASIX) 40 MG tablet Take 1 tablet (40 mg) by mouth 2 times daily 18  Yes Jael Rubio MD   GABAPENTIN PO Take 300 mg by mouth 3 times daily   Yes  Reported, Patient   Glucose Blood (BLOOD GLUCOSE TEST STRIPS) STRP 1 strip by Lancet route 4 times daily 4/11/18  Yes Alisa West MD   insulin glargine (LANTUS) 100 UNIT/ML injection Inject 50 Units Subcutaneous every morning 4/11/18  Yes Alisa West MD   insulin pen needle (BD ENE U/F) 32G X 4 MM Use 1 pen needles daily or as directed. 4/11/18  Yes Alisa West MD   linagliptin (TRADJENTA) 5 MG TABS tablet Take 1 tablet (5 mg) by mouth daily  Patient taking differently: Take 5 mg by mouth every morning  4/11/18  Yes Alisa West MD   losartan (COZAAR) 25 MG tablet Take 1 tablet (25 mg) by mouth daily  Patient taking differently: Take 25 mg by mouth every morning  4/4/18  Yes Jael Rubio MD   metoprolol succinate (TOPROL-XL) 50 MG 24 hr tablet Take 1 tablet (50 mg) by mouth daily  Patient taking differently: Take 50 mg by mouth every morning  4/4/18  Yes Jael Rubio MD   multivitamin, therapeutic with minerals (MULTI-VITAMIN) TABS tablet Take 1 tablet by mouth every morning   Yes Reported, Patient   mycophenolic acid (GENERIC EQUIVALENT) 360 MG EC tablet Take 2 tablets (720 mg) by mouth every 12 hours  Patient taking differently: Take 720 mg by mouth 2 times daily  11/20/17  Yes Jovan Tran MD   omeprazole (PRILOSEC) 20 MG CR capsule Take 1 capsule (20 mg) by mouth 2 times daily 5/7/18  Yes Toñito Camejo MD   predniSONE (DELTASONE) 2.5 MG tablet Take 1 tablet (2.5 mg) by mouth daily  Patient taking differently: Take 2.5 mg by mouth every morning  2/5/18  Yes Jovan Tran MD   tacrolimus (GENERIC EQUIVALENT) 1 MG capsule Take 3 capsules (3 mg) by mouth every 12 hours 6/4/18  Yes Tip Zhang MD       Allergies  Allergies   Allergen Reactions     Erythromycin GI Disturbance     Vioxx      Nausea, vomiting       Review of systems  A 10-point review of systems was negative    Examination  /83 (BP Location:  "Left arm, Patient Position: Sitting, Cuff Size: Adult Regular)  Pulse 70  Temp 98.5  F (36.9  C) (Oral)  Resp 16  Ht 1.829 m (6' 0.01\")  Wt 100.6 kg (221 lb 11.2 oz)  SpO2 98%  BMI 30.06 kg/m2  Body mass index is 30.06 kg/(m^2).    Gen- well, NAD, A+Ox3, normal color  Lym- no palpable LAD  CVS- RRR  RS- CTA  Abd- soft, non tender, non distended, bowel sounds audible.   Extr- hands normal, no DESMOND  Skin- no rash or jaundice  Neuro- no asterixis  Psych- normal mood    Laboratory  Lab Results   Component Value Date     06/05/2018    POTASSIUM 5.2 06/05/2018    CHLORIDE 111 06/05/2018    CO2 25 06/05/2018    BUN 32 06/05/2018    CR 1.08 06/05/2018       Lab Results   Component Value Date    BILITOTAL 0.8 06/05/2018    ALT 85 06/05/2018    AST 91 06/05/2018    ALKPHOS 605 06/05/2018       Lab Results   Component Value Date    ALBUMIN 3.1 06/05/2018    PROTTOTAL 6.8 06/05/2018        Lab Results   Component Value Date    WBC 3.4 06/05/2018    HGB 10.5 06/05/2018    MCV 96 06/05/2018    PLT 76 06/05/2018       Lab Results   Component Value Date    INR 0.89 06/01/2018       Radiology    Assessment and plan  52 year old male  s/p liver transplant due to liver carcinoma and CASTAÑEDA who recently had liver biopsy which revealed mild acute rejection. Currently he is asymptomatic and his liver function tests reveal an improving trend.     Tacrolimus was recently increased to 3 mg twice daily from 1 mg daily. Patients recent tacrolimus level of < 3.0 does not reflect the new dose changes. He will have a repeat level drawn on Friday and further changes in tacrolimus dose will be based on that. Continue prednisone at the same dose of 2.5 mg.   He will follow up in 3 months.       Nilay Euceda MD   Resident  Hepatology  [facility]    The patient was seen and examined.  The above assessment and plan was developed jointly with the resident.      Toñito Camejo MD      Professor of " Medicine  [employer]      Executive Medical Director, Solid Organ Transplant Program  [facility]

## 2018-06-08 ENCOUNTER — RADIANT APPOINTMENT (OUTPATIENT)
Dept: GENERAL RADIOLOGY | Facility: CLINIC | Age: 53
End: 2018-06-08
Attending: INTERNAL MEDICINE
Payer: COMMERCIAL

## 2018-06-08 DIAGNOSIS — Z46.89 ENCOUNTER FOR REMOVAL OF PANCREATIC STENT: ICD-10-CM

## 2018-06-08 DIAGNOSIS — N18.30 CKD (CHRONIC KIDNEY DISEASE) STAGE 3, GFR 30-59 ML/MIN (H): ICD-10-CM

## 2018-06-08 DIAGNOSIS — Z94.4 LIVER REPLACED BY TRANSPLANT (H): ICD-10-CM

## 2018-06-08 DIAGNOSIS — Z94.4 LIVER TRANSPLANT RECIPIENT (H): ICD-10-CM

## 2018-06-08 LAB
ALBUMIN SERPL-MCNC: 3.4 G/DL (ref 3.4–5)
ALP SERPL-CCNC: 621 U/L (ref 40–150)
ALT SERPL W P-5'-P-CCNC: 105 U/L (ref 0–70)
ANION GAP SERPL CALCULATED.3IONS-SCNC: 8 MMOL/L (ref 3–14)
AST SERPL W P-5'-P-CCNC: 107 U/L (ref 0–45)
BILIRUB DIRECT SERPL-MCNC: 0.4 MG/DL (ref 0–0.2)
BILIRUB SERPL-MCNC: 0.8 MG/DL (ref 0.2–1.3)
BUN SERPL-MCNC: 32 MG/DL (ref 7–30)
CALCIUM SERPL-MCNC: 8.1 MG/DL (ref 8.5–10.1)
CHLORIDE SERPL-SCNC: 107 MMOL/L (ref 94–109)
CO2 SERPL-SCNC: 24 MMOL/L (ref 20–32)
CREAT SERPL-MCNC: 1.25 MG/DL (ref 0.66–1.25)
ERYTHROCYTE [DISTWIDTH] IN BLOOD BY AUTOMATED COUNT: 15.1 % (ref 10–15)
GFR SERPL CREATININE-BSD FRML MDRD: 60 ML/MIN/1.7M2
GLUCOSE SERPL-MCNC: 266 MG/DL (ref 70–99)
HCT VFR BLD AUTO: 31.3 % (ref 40–53)
HGB BLD-MCNC: 10.4 G/DL (ref 13.3–17.7)
MAGNESIUM SERPL-MCNC: 1.9 MG/DL (ref 1.6–2.3)
MCH RBC QN AUTO: 31.5 PG (ref 26.5–33)
MCHC RBC AUTO-ENTMCNC: 33.2 G/DL (ref 31.5–36.5)
MCV RBC AUTO: 95 FL (ref 78–100)
PHOSPHATE SERPL-MCNC: 3.1 MG/DL (ref 2.5–4.5)
PLATELET # BLD AUTO: 72 10E9/L (ref 150–450)
POTASSIUM SERPL-SCNC: 4.8 MMOL/L (ref 3.4–5.3)
PROT SERPL-MCNC: 6.8 G/DL (ref 6.8–8.8)
RBC # BLD AUTO: 3.3 10E12/L (ref 4.4–5.9)
SODIUM SERPL-SCNC: 138 MMOL/L (ref 133–144)
TACROLIMUS BLD-MCNC: 4.2 UG/L (ref 5–15)
TME LAST DOSE: 2200 H
WBC # BLD AUTO: 3.2 10E9/L (ref 4–11)

## 2018-06-11 DIAGNOSIS — Z94.4 LIVER TRANSPLANT RECIPIENT (H): ICD-10-CM

## 2018-06-11 PROBLEM — T86.41 LIVER TRANSPLANT REJECTION (H): Status: ACTIVE | Noted: 2018-06-11

## 2018-06-11 RX ORDER — TACROLIMUS 1 MG/1
CAPSULE ORAL
Qty: 270 CAPSULE | Refills: 11 | Status: SHIPPED | OUTPATIENT
Start: 2018-06-11 | End: 2018-06-25

## 2018-06-11 NOTE — TELEPHONE ENCOUNTER
Please call Camacho about tacrolimus level = 4.2 done on 6/08, with liver tests increased slightly.   Increase dose to 5mg in am, 4mg in pm, take every 12 hours.  Ask Camacho to recheck his labs on  Wed or Thurs w/tacrolimus level.

## 2018-06-11 NOTE — TELEPHONE ENCOUNTER
Left message with directions from coordinator below. Camacho called back to let us know he was taking 3 mg BID of tacrolimus. I told him now to increase his AM dose to 4 mg and keep his PM dose at 3 mg. He is getting labs done tomorrow, and Friday.

## 2018-06-12 ENCOUNTER — HOSPITAL ENCOUNTER (OUTPATIENT)
Dept: NUCLEAR MEDICINE | Facility: CLINIC | Age: 53
Setting detail: NUCLEAR MEDICINE
Discharge: HOME | End: 2018-06-12
Attending: INTERNAL MEDICINE | Admitting: INTERNAL MEDICINE
Payer: COMMERCIAL

## 2018-06-12 DIAGNOSIS — N18.30 CKD (CHRONIC KIDNEY DISEASE) STAGE 3, GFR 30-59 ML/MIN (H): ICD-10-CM

## 2018-06-12 DIAGNOSIS — Z94.4 LIVER REPLACED BY TRANSPLANT (H): ICD-10-CM

## 2018-06-12 DIAGNOSIS — N13.30 HYDRONEPHROSIS: ICD-10-CM

## 2018-06-12 DIAGNOSIS — D72.819 DECREASED WHITE BLOOD CELL COUNT: ICD-10-CM

## 2018-06-12 DIAGNOSIS — Z94.4 LIVER TRANSPLANT RECIPIENT (H): ICD-10-CM

## 2018-06-12 DIAGNOSIS — Z79.60 LONG-TERM USE OF IMMUNOSUPPRESSANT MEDICATION: ICD-10-CM

## 2018-06-12 DIAGNOSIS — Z94.4 HISTORY OF LIVER TRANSPLANT (H): Primary | ICD-10-CM

## 2018-06-12 DIAGNOSIS — Z86.19 HX OF CYTOMEGALOVIRUS INFECTION: ICD-10-CM

## 2018-06-12 LAB
ALBUMIN SERPL-MCNC: 3.3 G/DL (ref 3.4–5)
ALP SERPL-CCNC: 590 U/L (ref 40–150)
ALT SERPL W P-5'-P-CCNC: 105 U/L (ref 0–70)
ANION GAP SERPL CALCULATED.3IONS-SCNC: 7 MMOL/L (ref 3–14)
AST SERPL W P-5'-P-CCNC: 105 U/L (ref 0–45)
BASOPHILS # BLD AUTO: 0 10E9/L (ref 0–0.2)
BASOPHILS NFR BLD AUTO: 0.3 %
BILIRUB DIRECT SERPL-MCNC: 0.4 MG/DL (ref 0–0.2)
BILIRUB SERPL-MCNC: 0.8 MG/DL (ref 0.2–1.3)
BUN SERPL-MCNC: 36 MG/DL (ref 7–30)
CALCIUM SERPL-MCNC: 8.2 MG/DL (ref 8.5–10.1)
CHLORIDE SERPL-SCNC: 108 MMOL/L (ref 94–109)
CO2 SERPL-SCNC: 23 MMOL/L (ref 20–32)
CREAT SERPL-MCNC: 1.2 MG/DL (ref 0.66–1.25)
DIFFERENTIAL METHOD BLD: NORMAL
EOSINOPHIL # BLD AUTO: 0.1 10E9/L (ref 0–0.7)
EOSINOPHIL NFR BLD AUTO: 1.7 %
ERYTHROCYTE [DISTWIDTH] IN BLOOD BY AUTOMATED COUNT: 14.9 % (ref 10–15)
GFR SERPL CREATININE-BSD FRML MDRD: 63 ML/MIN/1.7M2
GLUCOSE SERPL-MCNC: 284 MG/DL (ref 70–99)
HCT VFR BLD AUTO: 33.3 % (ref 40–53)
HGB BLD-MCNC: 11.1 G/DL (ref 13.3–17.7)
IMM GRANULOCYTES # BLD: 0 10E9/L (ref 0–0.4)
IMM GRANULOCYTES NFR BLD: 0 %
LYMPHOCYTES # BLD AUTO: 1 10E9/L (ref 0.8–5.3)
LYMPHOCYTES NFR BLD AUTO: 32.2 %
MAGNESIUM SERPL-MCNC: 1.8 MG/DL (ref 1.6–2.3)
MCH RBC QN AUTO: 31.8 PG (ref 26.5–33)
MCHC RBC AUTO-ENTMCNC: 33.3 G/DL (ref 31.5–36.5)
MCV RBC AUTO: 95 FL (ref 78–100)
MONOCYTES # BLD AUTO: 0.2 10E9/L (ref 0–1.3)
MONOCYTES NFR BLD AUTO: 7.4 %
NEUTROPHILS # BLD AUTO: 1.7 10E9/L (ref 1.6–8.3)
NEUTROPHILS NFR BLD AUTO: 58.4 %
NRBC # BLD AUTO: 0 10*3/UL
NRBC BLD AUTO-RTO: 0 /100
PHOSPHATE SERPL-MCNC: 3.2 MG/DL (ref 2.5–4.5)
PLATELET # BLD AUTO: 68 10E9/L (ref 150–450)
POTASSIUM SERPL-SCNC: 5.4 MMOL/L (ref 3.4–5.3)
PROT SERPL-MCNC: 6.9 G/DL (ref 6.8–8.8)
RBC # BLD AUTO: 3.49 10E12/L (ref 4.4–5.9)
SODIUM SERPL-SCNC: 138 MMOL/L (ref 133–144)
TACROLIMUS BLD-MCNC: 4.8 UG/L (ref 5–15)
TME LAST DOSE: ABNORMAL H
WBC # BLD AUTO: 2.8 10E9/L (ref 4–11)

## 2018-06-12 PROCEDURE — 25000128 H RX IP 250 OP 636: Performed by: INTERNAL MEDICINE

## 2018-06-12 PROCEDURE — 78708 K FLOW/FUNCT IMAGE W/DRUG: CPT

## 2018-06-12 PROCEDURE — A9562 TC99M MERTIATIDE: HCPCS | Performed by: INTERNAL MEDICINE

## 2018-06-12 PROCEDURE — 34300033 ZZH RX 343: Performed by: INTERNAL MEDICINE

## 2018-06-12 RX ORDER — FUROSEMIDE 10 MG/ML
20 INJECTION INTRAMUSCULAR; INTRAVENOUS ONCE
Status: COMPLETED | OUTPATIENT
Start: 2018-06-12 | End: 2018-06-12

## 2018-06-12 RX ADMIN — FUROSEMIDE 20 MG: 10 INJECTION, SOLUTION INTRAVENOUS at 11:54

## 2018-06-12 RX ADMIN — Medication 9.9 MILLICURIE: at 11:30

## 2018-06-12 NOTE — PROGRESS NOTES
Spoke with Camacho, who asks about his WBC 2.8.  He states he feels well, no c/o.   Reviewed labs done today, LFT's about the same.  Plan made to recheck labs next week.  Asked him to call back if has new symptoms, fever, etc.    Will check WBC diff as part of f/u for today's labs.

## 2018-06-13 ENCOUNTER — TELEPHONE (OUTPATIENT)
Dept: TRANSPLANT | Facility: CLINIC | Age: 53
End: 2018-06-13

## 2018-06-13 DIAGNOSIS — Z94.4 HISTORY OF LIVER TRANSPLANT (H): Primary | ICD-10-CM

## 2018-06-13 DIAGNOSIS — Z79.60 LONG-TERM USE OF IMMUNOSUPPRESSANT MEDICATION: ICD-10-CM

## 2018-06-13 NOTE — TELEPHONE ENCOUNTER
Spoke with Camacho about lab results, tacrolimus dose was changed the night before labs drawn.   Camacho will recheck labs this week , with added recheck of CMV DNA quant.     Camacho states he feels well overall.

## 2018-06-15 ENCOUNTER — HOSPITAL ENCOUNTER (OUTPATIENT)
Dept: LAB | Facility: CLINIC | Age: 53
Discharge: HOME | End: 2018-06-15
Payer: COMMERCIAL

## 2018-06-15 ENCOUNTER — TELEPHONE (OUTPATIENT)
Dept: TRANSPLANT | Facility: CLINIC | Age: 53
End: 2018-06-15

## 2018-06-15 ENCOUNTER — CARE COORDINATION (OUTPATIENT)
Dept: NEPHROLOGY | Facility: CLINIC | Age: 53
End: 2018-06-15

## 2018-06-15 DIAGNOSIS — Z79.60 LONG-TERM USE OF IMMUNOSUPPRESSANT MEDICATION: ICD-10-CM

## 2018-06-15 DIAGNOSIS — Z94.4 LIVER TRANSPLANT RECIPIENT (H): ICD-10-CM

## 2018-06-15 DIAGNOSIS — N18.4 CHRONIC KIDNEY DISEASE, STAGE 4 (SEVERE) (H): ICD-10-CM

## 2018-06-15 DIAGNOSIS — Z94.4 LIVER REPLACED BY TRANSPLANT (H): ICD-10-CM

## 2018-06-15 DIAGNOSIS — Z94.4 HISTORY OF LIVER TRANSPLANT (H): ICD-10-CM

## 2018-06-15 DIAGNOSIS — D72.819 DECREASED WHITE BLOOD CELL COUNT: ICD-10-CM

## 2018-06-15 DIAGNOSIS — E87.5 HYPERKALEMIA: Primary | ICD-10-CM

## 2018-06-15 LAB
ALBUMIN SERPL-MCNC: 3.3 G/DL (ref 3.4–5)
ALP SERPL-CCNC: 543 U/L (ref 40–150)
ALT SERPL W P-5'-P-CCNC: 93 U/L (ref 0–70)
ANION GAP SERPL CALCULATED.3IONS-SCNC: 4 MMOL/L (ref 3–14)
AST SERPL W P-5'-P-CCNC: 78 U/L (ref 0–45)
BASOPHILS # BLD AUTO: 0 10E9/L (ref 0–0.2)
BASOPHILS NFR BLD AUTO: 0 %
BILIRUB DIRECT SERPL-MCNC: 0.3 MG/DL (ref 0–0.2)
BILIRUB SERPL-MCNC: 0.6 MG/DL (ref 0.2–1.3)
BUN SERPL-MCNC: 45 MG/DL (ref 7–30)
CALCIUM SERPL-MCNC: 8.4 MG/DL (ref 8.5–10.1)
CHLORIDE SERPL-SCNC: 109 MMOL/L (ref 94–109)
CO2 SERPL-SCNC: 24 MMOL/L (ref 20–32)
CREAT SERPL-MCNC: 1.31 MG/DL (ref 0.66–1.25)
DIFFERENTIAL METHOD BLD: NORMAL
EOSINOPHIL # BLD AUTO: 0 10E9/L (ref 0–0.7)
EOSINOPHIL NFR BLD AUTO: 1.3 %
ERYTHROCYTE [DISTWIDTH] IN BLOOD BY AUTOMATED COUNT: 15 % (ref 10–15)
GFR SERPL CREATININE-BSD FRML MDRD: 57 ML/MIN/1.7M2
GLUCOSE SERPL-MCNC: 351 MG/DL (ref 70–99)
HCT VFR BLD AUTO: 33.7 % (ref 40–53)
HGB BLD-MCNC: 10.8 G/DL (ref 13.3–17.7)
IMM GRANULOCYTES # BLD: 0 10E9/L (ref 0–0.4)
IMM GRANULOCYTES NFR BLD: 0.3 %
LYMPHOCYTES # BLD AUTO: 1 10E9/L (ref 0.8–5.3)
LYMPHOCYTES NFR BLD AUTO: 31.9 %
MAGNESIUM SERPL-MCNC: 2 MG/DL (ref 1.6–2.3)
MCH RBC QN AUTO: 31.8 PG (ref 26.5–33)
MCHC RBC AUTO-ENTMCNC: 32 G/DL (ref 31.5–36.5)
MCV RBC AUTO: 99 FL (ref 78–100)
MONOCYTES # BLD AUTO: 0.2 10E9/L (ref 0–1.3)
MONOCYTES NFR BLD AUTO: 6.9 %
NEUTROPHILS # BLD AUTO: 1.9 10E9/L (ref 1.6–8.3)
NEUTROPHILS NFR BLD AUTO: 59.6 %
NRBC # BLD AUTO: 0 10*3/UL
NRBC BLD AUTO-RTO: 0 /100
PHOSPHATE SERPL-MCNC: 3.5 MG/DL (ref 2.5–4.5)
PLATELET # BLD AUTO: 70 10E9/L (ref 150–450)
POTASSIUM SERPL-SCNC: 6.1 MMOL/L (ref 3.4–5.3)
PROT SERPL-MCNC: 6.8 G/DL (ref 6.8–8.8)
RBC # BLD AUTO: 3.4 10E12/L (ref 4.4–5.9)
SODIUM SERPL-SCNC: 137 MMOL/L (ref 133–144)
TACROLIMUS BLD-MCNC: 6.8 UG/L (ref 5–15)
TME LAST DOSE: NORMAL H
WBC # BLD AUTO: 3.2 10E9/L (ref 4–11)

## 2018-06-15 PROCEDURE — 85004 AUTOMATED DIFF WBC COUNT: CPT | Performed by: INTERNAL MEDICINE

## 2018-06-15 PROCEDURE — 84460 ALANINE AMINO (ALT) (SGPT): CPT | Performed by: INTERNAL MEDICINE

## 2018-06-15 PROCEDURE — 80076 HEPATIC FUNCTION PANEL: CPT | Performed by: INTERNAL MEDICINE

## 2018-06-15 PROCEDURE — 84155 ASSAY OF PROTEIN SERUM: CPT | Performed by: INTERNAL MEDICINE

## 2018-06-15 PROCEDURE — 82248 BILIRUBIN DIRECT: CPT | Performed by: INTERNAL MEDICINE

## 2018-06-15 PROCEDURE — 84450 TRANSFERASE (AST) (SGOT): CPT | Performed by: INTERNAL MEDICINE

## 2018-06-15 PROCEDURE — 36415 COLL VENOUS BLD VENIPUNCTURE: CPT | Performed by: INTERNAL MEDICINE

## 2018-06-15 PROCEDURE — 84075 ASSAY ALKALINE PHOSPHATASE: CPT | Performed by: INTERNAL MEDICINE

## 2018-06-15 PROCEDURE — 82247 BILIRUBIN TOTAL: CPT | Performed by: INTERNAL MEDICINE

## 2018-06-15 PROCEDURE — 83735 ASSAY OF MAGNESIUM: CPT | Performed by: INTERNAL MEDICINE

## 2018-06-15 PROCEDURE — 80197 ASSAY OF TACROLIMUS: CPT | Performed by: INTERNAL MEDICINE

## 2018-06-15 PROCEDURE — 80069 RENAL FUNCTION PANEL: CPT | Performed by: INTERNAL MEDICINE

## 2018-06-15 PROCEDURE — 85027 COMPLETE CBC AUTOMATED: CPT | Performed by: INTERNAL MEDICINE

## 2018-06-15 NOTE — TELEPHONE ENCOUNTER
Spoke with Camacho about potassium elevation, he asks if he can be restarted on valtessa?  Was taking this daily to lower potassium.  With increase in tacrolimus for mild acute rejection of liver transplant, this may have promoted increased potassium.  Asked pt to increase hydration, avoid potassium foods and fluids with potassium.    Plan to re contact pt after reviewed with renal, or renal RN will be contacting him.

## 2018-06-15 NOTE — TELEPHONE ENCOUNTER
Spoke with Quang RN with nephrology, she has reviewed with , and pt will be restarted on Valtessa, Quang will contact Camacho.

## 2018-06-15 NOTE — PROGRESS NOTES
Left VM for Camacho to return call to discuss restarting Veltassa for elevated potassium of 6.1.   Transplant coordinator, Abril, already spoke to patient about it this morning, and it sounds like he's agreeable to re-starting Veltassa. Will see if patient needs new Rx sent or if he still has some at home.    Quang Conteh RN

## 2018-06-15 NOTE — TELEPHONE ENCOUNTER
DATE:  6/15/2018   TIME OF RECEIPT FROM LAB:  1040  LAB TEST:  Potassium  LAB VALUE:  6.1  RESULTS GIVEN WITH READ-BACK TO (PROVIDER):  Abril Doty  TIME LAB VALUE REPORTED TO PROVIDER:   1045

## 2018-06-15 NOTE — PROGRESS NOTES
Patient returned call. He confirmed he has about 15-20 days worth of Veltassa available at home, but will need a new prescription moving forward. Sent Rx to [facility] who was previously dispensing this for him.     Quang Conteh RN

## 2018-06-19 ENCOUNTER — HOSPITAL ENCOUNTER (OUTPATIENT)
Dept: LAB | Facility: CLINIC | Age: 53
Discharge: HOME | End: 2018-06-19
Attending: INTERNAL MEDICINE | Admitting: INTERNAL MEDICINE
Payer: COMMERCIAL

## 2018-06-19 DIAGNOSIS — Z94.4 HISTORY OF LIVER TRANSPLANT (H): ICD-10-CM

## 2018-06-19 DIAGNOSIS — D72.819 DECREASED WHITE BLOOD CELL COUNT: ICD-10-CM

## 2018-06-19 DIAGNOSIS — Z94.4 LIVER TRANSPLANT RECIPIENT (H): ICD-10-CM

## 2018-06-19 DIAGNOSIS — Z94.4 LIVER REPLACED BY TRANSPLANT (H): ICD-10-CM

## 2018-06-19 DIAGNOSIS — Z79.60 LONG-TERM USE OF IMMUNOSUPPRESSANT MEDICATION: ICD-10-CM

## 2018-06-19 DIAGNOSIS — N18.30 CKD (CHRONIC KIDNEY DISEASE) STAGE 3, GFR 30-59 ML/MIN (H): ICD-10-CM

## 2018-06-19 LAB
ALBUMIN SERPL-MCNC: 3.4 G/DL (ref 3.4–5)
ALP SERPL-CCNC: 481 U/L (ref 40–150)
ALT SERPL W P-5'-P-CCNC: 92 U/L (ref 0–70)
ANION GAP SERPL CALCULATED.3IONS-SCNC: 4 MMOL/L (ref 3–14)
AST SERPL W P-5'-P-CCNC: 73 U/L (ref 0–45)
BASOPHILS # BLD AUTO: 0 10E9/L (ref 0–0.2)
BASOPHILS NFR BLD AUTO: 0.3 %
BILIRUB DIRECT SERPL-MCNC: 0.3 MG/DL (ref 0–0.2)
BILIRUB SERPL-MCNC: 0.6 MG/DL (ref 0.2–1.3)
BUN SERPL-MCNC: 40 MG/DL (ref 7–30)
CALCIUM SERPL-MCNC: 8.3 MG/DL (ref 8.5–10.1)
CHLORIDE SERPL-SCNC: 111 MMOL/L (ref 94–109)
CO2 SERPL-SCNC: 24 MMOL/L (ref 20–32)
CREAT SERPL-MCNC: 1.26 MG/DL (ref 0.66–1.25)
DIFFERENTIAL METHOD BLD: NORMAL
EOSINOPHIL # BLD AUTO: 0 10E9/L (ref 0–0.7)
EOSINOPHIL NFR BLD AUTO: 1.4 %
ERYTHROCYTE [DISTWIDTH] IN BLOOD BY AUTOMATED COUNT: 14.7 % (ref 10–15)
GFR SERPL CREATININE-BSD FRML MDRD: 60 ML/MIN/1.7M2
GLUCOSE SERPL-MCNC: 309 MG/DL (ref 70–99)
HCT VFR BLD AUTO: 32.1 % (ref 40–53)
HGB BLD-MCNC: 10.5 G/DL (ref 13.3–17.7)
IMM GRANULOCYTES # BLD: 0 10E9/L (ref 0–0.4)
IMM GRANULOCYTES NFR BLD: 0.3 %
LYMPHOCYTES # BLD AUTO: 0.9 10E9/L (ref 0.8–5.3)
LYMPHOCYTES NFR BLD AUTO: 29.6 %
MAGNESIUM SERPL-MCNC: 1.9 MG/DL (ref 1.6–2.3)
MCH RBC QN AUTO: 31.8 PG (ref 26.5–33)
MCHC RBC AUTO-ENTMCNC: 32.7 G/DL (ref 31.5–36.5)
MCV RBC AUTO: 97 FL (ref 78–100)
MONOCYTES # BLD AUTO: 0.2 10E9/L (ref 0–1.3)
MONOCYTES NFR BLD AUTO: 6.1 %
NEUTROPHILS # BLD AUTO: 1.8 10E9/L (ref 1.6–8.3)
NEUTROPHILS NFR BLD AUTO: 62.3 %
NRBC # BLD AUTO: 0 10*3/UL
NRBC BLD AUTO-RTO: 0 /100
PHOSPHATE SERPL-MCNC: 3.2 MG/DL (ref 2.5–4.5)
PLATELET # BLD AUTO: 64 10E9/L (ref 150–450)
POTASSIUM SERPL-SCNC: 5.9 MMOL/L (ref 3.4–5.3)
PROT SERPL-MCNC: 6.8 G/DL (ref 6.8–8.8)
RBC # BLD AUTO: 3.3 10E12/L (ref 4.4–5.9)
SODIUM SERPL-SCNC: 139 MMOL/L (ref 133–144)
TACROLIMUS BLD-MCNC: 6.2 UG/L (ref 5–15)
TME LAST DOSE: NORMAL H
WBC # BLD AUTO: 2.9 10E9/L (ref 4–11)

## 2018-06-19 PROCEDURE — 83735 ASSAY OF MAGNESIUM: CPT

## 2018-06-19 PROCEDURE — 85004 AUTOMATED DIFF WBC COUNT: CPT

## 2018-06-19 PROCEDURE — 82247 BILIRUBIN TOTAL: CPT

## 2018-06-19 PROCEDURE — 82248 BILIRUBIN DIRECT: CPT

## 2018-06-19 PROCEDURE — 80069 RENAL FUNCTION PANEL: CPT

## 2018-06-19 PROCEDURE — 80197 ASSAY OF TACROLIMUS: CPT | Performed by: INTERNAL MEDICINE

## 2018-06-19 PROCEDURE — 84450 TRANSFERASE (AST) (SGOT): CPT

## 2018-06-19 PROCEDURE — 36415 COLL VENOUS BLD VENIPUNCTURE: CPT

## 2018-06-19 PROCEDURE — 84460 ALANINE AMINO (ALT) (SGPT): CPT

## 2018-06-19 PROCEDURE — 85027 COMPLETE CBC AUTOMATED: CPT

## 2018-06-19 PROCEDURE — 84075 ASSAY ALKALINE PHOSPHATASE: CPT

## 2018-06-19 PROCEDURE — 84155 ASSAY OF PROTEIN SERUM: CPT

## 2018-06-22 ENCOUNTER — TELEPHONE (OUTPATIENT)
Dept: TRANSPLANT | Facility: CLINIC | Age: 53
End: 2018-06-22

## 2018-06-22 ENCOUNTER — CARE COORDINATION (OUTPATIENT)
Dept: GASTROENTEROLOGY | Facility: CLINIC | Age: 53
End: 2018-06-22

## 2018-06-22 ENCOUNTER — HOSPITAL ENCOUNTER (OUTPATIENT)
Dept: LAB | Facility: CLINIC | Age: 53
Discharge: HOME | End: 2018-06-22
Attending: INTERNAL MEDICINE
Payer: COMMERCIAL

## 2018-06-22 DIAGNOSIS — Z94.4 HISTORY OF LIVER TRANSPLANT (H): ICD-10-CM

## 2018-06-22 DIAGNOSIS — Z79.60 LONG-TERM USE OF IMMUNOSUPPRESSANT MEDICATION: ICD-10-CM

## 2018-06-22 DIAGNOSIS — Z94.4 LIVER REPLACED BY TRANSPLANT (H): ICD-10-CM

## 2018-06-22 DIAGNOSIS — Z94.4 LIVER TRANSPLANT RECIPIENT (H): ICD-10-CM

## 2018-06-22 DIAGNOSIS — D72.819 DECREASED WHITE BLOOD CELL COUNT: ICD-10-CM

## 2018-06-22 DIAGNOSIS — N18.30 CKD (CHRONIC KIDNEY DISEASE) STAGE 3, GFR 30-59 ML/MIN (H): ICD-10-CM

## 2018-06-22 LAB
ALBUMIN SERPL-MCNC: 3.7 G/DL (ref 3.4–5)
ALP SERPL-CCNC: 502 U/L (ref 40–150)
ALT SERPL W P-5'-P-CCNC: 91 U/L (ref 0–70)
ANION GAP SERPL CALCULATED.3IONS-SCNC: 5 MMOL/L (ref 3–14)
AST SERPL W P-5'-P-CCNC: 68 U/L (ref 0–45)
BASOPHILS # BLD AUTO: 0 10E9/L (ref 0–0.2)
BASOPHILS NFR BLD AUTO: 0.3 %
BILIRUB DIRECT SERPL-MCNC: 0.3 MG/DL (ref 0–0.2)
BILIRUB SERPL-MCNC: 0.8 MG/DL (ref 0.2–1.3)
BUN SERPL-MCNC: 46 MG/DL (ref 7–30)
CALCIUM SERPL-MCNC: 8.4 MG/DL (ref 8.5–10.1)
CHLORIDE SERPL-SCNC: 109 MMOL/L (ref 94–109)
CO2 SERPL-SCNC: 24 MMOL/L (ref 20–32)
CREAT SERPL-MCNC: 1.41 MG/DL (ref 0.66–1.25)
DIFFERENTIAL METHOD BLD: NORMAL
EOSINOPHIL # BLD AUTO: 0.1 10E9/L (ref 0–0.7)
EOSINOPHIL NFR BLD AUTO: 1.3 %
ERYTHROCYTE [DISTWIDTH] IN BLOOD BY AUTOMATED COUNT: 14.6 % (ref 10–15)
GFR SERPL CREATININE-BSD FRML MDRD: 52 ML/MIN/1.7M2
GLUCOSE SERPL-MCNC: 281 MG/DL (ref 70–99)
HCT VFR BLD AUTO: 32.1 % (ref 40–53)
HGB BLD-MCNC: 10.6 G/DL (ref 13.3–17.7)
IMM GRANULOCYTES # BLD: 0 10E9/L (ref 0–0.4)
IMM GRANULOCYTES NFR BLD: 0.3 %
LYMPHOCYTES # BLD AUTO: 1.2 10E9/L (ref 0.8–5.3)
LYMPHOCYTES NFR BLD AUTO: 32.3 %
MAGNESIUM SERPL-MCNC: 1.9 MG/DL (ref 1.6–2.3)
MCH RBC QN AUTO: 31.6 PG (ref 26.5–33)
MCHC RBC AUTO-ENTMCNC: 33 G/DL (ref 31.5–36.5)
MCV RBC AUTO: 96 FL (ref 78–100)
MONOCYTES # BLD AUTO: 0.3 10E9/L (ref 0–1.3)
MONOCYTES NFR BLD AUTO: 8.9 %
NEUTROPHILS # BLD AUTO: 2.1 10E9/L (ref 1.6–8.3)
NEUTROPHILS NFR BLD AUTO: 56.9 %
NRBC # BLD AUTO: 0 10*3/UL
NRBC BLD AUTO-RTO: 0 /100
PHOSPHATE SERPL-MCNC: 3.6 MG/DL (ref 2.5–4.5)
PLATELET # BLD AUTO: 61 10E9/L (ref 150–450)
POTASSIUM SERPL-SCNC: 6.2 MMOL/L (ref 3.4–5.3)
PROT SERPL-MCNC: 7.3 G/DL (ref 6.8–8.8)
RBC # BLD AUTO: 3.35 10E12/L (ref 4.4–5.9)
SODIUM SERPL-SCNC: 138 MMOL/L (ref 133–144)
TACROLIMUS BLD-MCNC: 7.3 UG/L (ref 5–15)
TME LAST DOSE: NORMAL H
WBC # BLD AUTO: 3.7 10E9/L (ref 4–11)

## 2018-06-22 PROCEDURE — 36415 COLL VENOUS BLD VENIPUNCTURE: CPT | Performed by: INTERNAL MEDICINE

## 2018-06-22 PROCEDURE — 85027 COMPLETE CBC AUTOMATED: CPT

## 2018-06-22 PROCEDURE — 84100 ASSAY OF PHOSPHORUS: CPT

## 2018-06-22 PROCEDURE — 84460 ALANINE AMINO (ALT) (SGPT): CPT

## 2018-06-22 PROCEDURE — 82247 BILIRUBIN TOTAL: CPT

## 2018-06-22 PROCEDURE — 84075 ASSAY ALKALINE PHOSPHATASE: CPT

## 2018-06-22 PROCEDURE — 84450 TRANSFERASE (AST) (SGOT): CPT

## 2018-06-22 PROCEDURE — 82248 BILIRUBIN DIRECT: CPT

## 2018-06-22 PROCEDURE — 84155 ASSAY OF PROTEIN SERUM: CPT

## 2018-06-22 PROCEDURE — 83735 ASSAY OF MAGNESIUM: CPT

## 2018-06-22 PROCEDURE — 80069 RENAL FUNCTION PANEL: CPT

## 2018-06-22 PROCEDURE — 85004 AUTOMATED DIFF WBC COUNT: CPT

## 2018-06-22 PROCEDURE — 36415 COLL VENOUS BLD VENIPUNCTURE: CPT

## 2018-06-22 PROCEDURE — 80197 ASSAY OF TACROLIMUS: CPT | Performed by: INTERNAL MEDICINE

## 2018-06-22 NOTE — TELEPHONE ENCOUNTER
DATE:  6/22/2018   TIME OF RECEIPT FROM LAB:  12:36 PM  LAB TEST:  Potassium  LAB VALUE:  6.2  RESULTS GIVEN WITH READ-BACK TO (PROVIDER):  Abril Doty RN  TIME LAB VALUE REPORTED TO PROVIDER:   12:46 PM

## 2018-06-22 NOTE — TELEPHONE ENCOUNTER
Pt called to report that he is having diarrhea again and he said the last time this happened was right after his dose was increased. Pt reports that he just started veltassa for his potassium. Pt is wondering about switching to a different immunosuppression that Dr Camejo mentioned.  Message to Dr camejo to review.   Pt denies fever, chills, night sweats and headaches.    Discussed patient that if he cannot keep his pills in or if he develops a fever he needs to come in to our ER.

## 2018-06-22 NOTE — PROGRESS NOTES
Stent follow-up for ERCP 5/22/2018 with Dr. Gaitan    Images reviewed by provider: stent passed     Letter mailed to patient from Dr. Gaitan.     Izabel STOVER RN Coordinator  Dr. Tracey, Dr. Gaitan & Dr. Quinones   [facility]  [phone]

## 2018-06-25 ENCOUNTER — CARE COORDINATION (OUTPATIENT)
Dept: NEPHROLOGY | Facility: CLINIC | Age: 53
End: 2018-06-25

## 2018-06-25 DIAGNOSIS — Z94.4 LIVER TRANSPLANT RECIPIENT (H): ICD-10-CM

## 2018-06-25 DIAGNOSIS — E87.5 HYPERKALEMIA: ICD-10-CM

## 2018-06-25 RX ORDER — TACROLIMUS 1 MG/1
4 CAPSULE ORAL EVERY 12 HOURS
Qty: 240 CAPSULE | Refills: 11 | Status: SHIPPED | OUTPATIENT
Start: 2018-06-25 | End: 2018-06-27

## 2018-06-25 NOTE — TELEPHONE ENCOUNTER
Reviewed Diarrhea symptoms with Dr aragon. Will try to decrease tacrolimus level to see if it improves. Pt currently taking 5 mg AM and 4 mg PM. Pt to decrease to 4 mg every 12 hours.

## 2018-06-25 NOTE — PROGRESS NOTES
Reason for Call    Called patient to check in regarding hyperkalemia and Veltassa. He is aware that his potassium is still elevated. His liver tx team is decreasing his tacrolimus level which may help. He confirmed that he is taking the Veltassa daily. He has not heard from [facility] about getting his refill delivered.     I called [facility] to confirm if they received the order I sent on 6/15. I apparently sent it to the wrong fax number, so I've re-sent this prescription. Patient said he currently has another 1-2 of Veltassa available at home.    Quang Conteh, RN, BAN  Nephrology RN Care Coordinator

## 2018-06-26 ENCOUNTER — HOSPITAL ENCOUNTER (OUTPATIENT)
Dept: LAB | Facility: CLINIC | Age: 53
Discharge: HOME | End: 2018-06-26
Payer: COMMERCIAL

## 2018-06-26 ENCOUNTER — CARE COORDINATION (OUTPATIENT)
Dept: NEPHROLOGY | Facility: CLINIC | Age: 53
End: 2018-06-26

## 2018-06-26 ENCOUNTER — TELEPHONE (OUTPATIENT)
Dept: TRANSPLANT | Facility: CLINIC | Age: 53
End: 2018-06-26

## 2018-06-26 DIAGNOSIS — N18.30 CHRONIC KIDNEY DISEASE, STAGE 3 (MODERATE): Primary | ICD-10-CM

## 2018-06-26 DIAGNOSIS — N18.30 CKD (CHRONIC KIDNEY DISEASE) STAGE 3, GFR 30-59 ML/MIN (H): ICD-10-CM

## 2018-06-26 DIAGNOSIS — Z79.60 LONG-TERM USE OF IMMUNOSUPPRESSANT MEDICATION: ICD-10-CM

## 2018-06-26 DIAGNOSIS — Z94.4 LIVER REPLACED BY TRANSPLANT (H): ICD-10-CM

## 2018-06-26 DIAGNOSIS — D72.819 DECREASED WHITE BLOOD CELL COUNT: ICD-10-CM

## 2018-06-26 DIAGNOSIS — Z94.4 LIVER TRANSPLANT RECIPIENT (H): ICD-10-CM

## 2018-06-26 DIAGNOSIS — Z94.4 HISTORY OF LIVER TRANSPLANT (H): ICD-10-CM

## 2018-06-26 LAB
ALBUMIN SERPL-MCNC: 3.6 G/DL (ref 3.4–5)
ALP SERPL-CCNC: 493 U/L (ref 40–150)
ALT SERPL W P-5'-P-CCNC: 81 U/L (ref 0–70)
ANION GAP SERPL CALCULATED.3IONS-SCNC: 4 MMOL/L (ref 3–14)
AST SERPL W P-5'-P-CCNC: 63 U/L (ref 0–45)
BASOPHILS # BLD AUTO: 0 10E9/L (ref 0–0.2)
BASOPHILS NFR BLD AUTO: 0.3 %
BILIRUB DIRECT SERPL-MCNC: 0.2 MG/DL (ref 0–0.2)
BILIRUB SERPL-MCNC: 0.5 MG/DL (ref 0.2–1.3)
BUN SERPL-MCNC: 36 MG/DL (ref 7–30)
CALCIUM SERPL-MCNC: 8.5 MG/DL (ref 8.5–10.1)
CHLORIDE SERPL-SCNC: 111 MMOL/L (ref 94–109)
CO2 SERPL-SCNC: 25 MMOL/L (ref 20–32)
CREAT SERPL-MCNC: 1.32 MG/DL (ref 0.66–1.25)
DIFFERENTIAL METHOD BLD: NORMAL
EOSINOPHIL # BLD AUTO: 0 10E9/L (ref 0–0.7)
EOSINOPHIL NFR BLD AUTO: 0.9 %
ERYTHROCYTE [DISTWIDTH] IN BLOOD BY AUTOMATED COUNT: 14.6 % (ref 10–15)
GFR SERPL CREATININE-BSD FRML MDRD: 57 ML/MIN/1.7M2
GLUCOSE SERPL-MCNC: 207 MG/DL (ref 70–99)
HCT VFR BLD AUTO: 34 % (ref 40–53)
HGB BLD-MCNC: 11.1 G/DL (ref 13.3–17.7)
IMM GRANULOCYTES # BLD: 0 10E9/L (ref 0–0.4)
IMM GRANULOCYTES NFR BLD: 0.3 %
LYMPHOCYTES # BLD AUTO: 1.2 10E9/L (ref 0.8–5.3)
LYMPHOCYTES NFR BLD AUTO: 37.3 %
MAGNESIUM SERPL-MCNC: 2 MG/DL (ref 1.6–2.3)
MCH RBC QN AUTO: 31.1 PG (ref 26.5–33)
MCHC RBC AUTO-ENTMCNC: 32.6 G/DL (ref 31.5–36.5)
MCV RBC AUTO: 95 FL (ref 78–100)
MONOCYTES # BLD AUTO: 0.2 10E9/L (ref 0–1.3)
MONOCYTES NFR BLD AUTO: 6.2 %
NEUTROPHILS # BLD AUTO: 1.8 10E9/L (ref 1.6–8.3)
NEUTROPHILS NFR BLD AUTO: 55 %
NRBC # BLD AUTO: 0 10*3/UL
NRBC BLD AUTO-RTO: 0 /100
PHOSPHATE SERPL-MCNC: 3.1 MG/DL (ref 2.5–4.5)
PLATELET # BLD AUTO: 68 10E9/L (ref 150–450)
POTASSIUM SERPL-SCNC: 6.2 MMOL/L (ref 3.4–5.3)
PROT SERPL-MCNC: 7 G/DL (ref 6.8–8.8)
RBC # BLD AUTO: 3.57 10E12/L (ref 4.4–5.9)
SODIUM SERPL-SCNC: 140 MMOL/L (ref 133–144)
TACROLIMUS BLD-MCNC: 7 UG/L (ref 5–15)
TME LAST DOSE: NORMAL H
WBC # BLD AUTO: 3.2 10E9/L (ref 4–11)

## 2018-06-26 PROCEDURE — 85027 COMPLETE CBC AUTOMATED: CPT

## 2018-06-26 PROCEDURE — 36415 COLL VENOUS BLD VENIPUNCTURE: CPT | Performed by: INTERNAL MEDICINE

## 2018-06-26 PROCEDURE — 80197 ASSAY OF TACROLIMUS: CPT | Performed by: INTERNAL MEDICINE

## 2018-06-26 PROCEDURE — 85004 AUTOMATED DIFF WBC COUNT: CPT

## 2018-06-26 PROCEDURE — 84075 ASSAY ALKALINE PHOSPHATASE: CPT

## 2018-06-26 PROCEDURE — 36415 COLL VENOUS BLD VENIPUNCTURE: CPT

## 2018-06-26 PROCEDURE — 84100 ASSAY OF PHOSPHORUS: CPT

## 2018-06-26 PROCEDURE — 84460 ALANINE AMINO (ALT) (SGPT): CPT

## 2018-06-26 PROCEDURE — 84450 TRANSFERASE (AST) (SGOT): CPT

## 2018-06-26 PROCEDURE — 80069 RENAL FUNCTION PANEL: CPT

## 2018-06-26 PROCEDURE — 84155 ASSAY OF PROTEIN SERUM: CPT

## 2018-06-26 PROCEDURE — 83735 ASSAY OF MAGNESIUM: CPT

## 2018-06-26 PROCEDURE — 82248 BILIRUBIN DIRECT: CPT

## 2018-06-26 PROCEDURE — 82247 BILIRUBIN TOTAL: CPT

## 2018-06-26 NOTE — TELEPHONE ENCOUNTER
Per discussion with Quang Conteh, the lab done today (per renal), elevation in potassium has been addressed.   These results have been reviewed  and a plan has been made regarding continuing issues with potassium.

## 2018-06-26 NOTE — PROGRESS NOTES
Reviewed high potassium with Jovanna Foster NP and Dr. Rubio. Potassium still elevated despite patient starting Veltassa. Jovanna recommends that patient stop losartan. He can also stop Veltassa for now since he does not seem to be responding to it. Will then get patient into clinic in the next few weeks to monitor proteinuria and blood pressures.    Called and left detailed VM for patient with these recommendations. Also updated patient's liver transplant coordinator.    Quang Conteh RN

## 2018-06-26 NOTE — TELEPHONE ENCOUNTER
DATE:  6/26/2018   TIME OF RECEIPT FROM LAB:  1152  LAB TEST:  potassium  LAB VALUE:  6.2  RESULTS GIVEN WITH READ-BACK TO (PROVIDER):  Kathy Fountain RN  TIME LAB VALUE REPORTED TO PROVIDER:   0499

## 2018-06-27 ENCOUNTER — TELEPHONE (OUTPATIENT)
Dept: TRANSPLANT | Facility: CLINIC | Age: 53
End: 2018-06-27

## 2018-06-27 DIAGNOSIS — R19.7 DIARRHEA: ICD-10-CM

## 2018-06-27 DIAGNOSIS — Z94.4 LIVER TRANSPLANT RECIPIENT (H): ICD-10-CM

## 2018-06-27 DIAGNOSIS — Z79.60 LONG-TERM USE OF IMMUNOSUPPRESSANT MEDICATION: ICD-10-CM

## 2018-06-27 DIAGNOSIS — Z94.4 HISTORY OF LIVER TRANSPLANT (H): Primary | ICD-10-CM

## 2018-06-27 RX ORDER — TACROLIMUS 1 MG/1
CAPSULE ORAL
Qty: 210 CAPSULE | Refills: 11 | Status: SHIPPED | OUTPATIENT
Start: 2018-06-27 | End: 2018-07-12

## 2018-06-27 NOTE — PROGRESS NOTES
Patient returned call. He said he will stop losartan but does not want to stop Veltassa yet. He will wait to discuss with Jovanna in clinic. He thinks his potassium has been elevated more due to Tacrolimus than the losartan because he had been on losartan for several weeks without issues until his tacrolimus was increased. Explained that they are likely both playing a role in the hyperkalemia. Will get him in for an appointment with Jovanna next Friday, 7/6.     Quang Conteh RN

## 2018-06-27 NOTE — TELEPHONE ENCOUNTER
Patient Call: Transplant Lab/Orders  Route to LPN  Post Transplant Days: 480  When patient is less than 60 days post-transplant, route high priority    Reason for Call: Discuss lab results; which results? Results released to [facility]  Callback needed? Yes- Same as documented

## 2018-06-27 NOTE — TELEPHONE ENCOUNTER
Spoke with Camacho about tacrolimus level = 7.0.   Camacho reports having watery diarrhea, has slowed slightly but barely changed overall.  He does not have other symptoms of fever, fatigue, night sweats.     Instructed Camacho to decrease tacrolimus dose to 3mg pm, continue 4mg in the am.     Plan: check if pt to have stool culture for continued watery diarrhea.

## 2018-06-29 ENCOUNTER — HOSPITAL ENCOUNTER (OUTPATIENT)
Dept: LAB | Facility: CLINIC | Age: 53
Discharge: HOME | End: 2018-06-29
Attending: INTERNAL MEDICINE | Admitting: INTERNAL MEDICINE
Payer: COMMERCIAL

## 2018-06-29 DIAGNOSIS — D72.819 DECREASED WHITE BLOOD CELL COUNT: ICD-10-CM

## 2018-06-29 DIAGNOSIS — Z94.4 LIVER REPLACED BY TRANSPLANT (H): ICD-10-CM

## 2018-06-29 DIAGNOSIS — Z94.4 LIVER TRANSPLANT RECIPIENT (H): ICD-10-CM

## 2018-06-29 DIAGNOSIS — N18.4 CHRONIC KIDNEY DISEASE, STAGE 4 (SEVERE) (H): ICD-10-CM

## 2018-06-29 DIAGNOSIS — Z79.60 LONG-TERM USE OF IMMUNOSUPPRESSANT MEDICATION: ICD-10-CM

## 2018-06-29 DIAGNOSIS — Z94.4 HISTORY OF LIVER TRANSPLANT (H): ICD-10-CM

## 2018-06-29 LAB
ALBUMIN SERPL-MCNC: 3.5 G/DL (ref 3.4–5)
ALP SERPL-CCNC: 480 U/L (ref 40–150)
ALT SERPL W P-5'-P-CCNC: 77 U/L (ref 0–70)
ANION GAP SERPL CALCULATED.3IONS-SCNC: 5 MMOL/L (ref 3–14)
AST SERPL W P-5'-P-CCNC: 62 U/L (ref 0–45)
BASOPHILS # BLD AUTO: 0 10E9/L (ref 0–0.2)
BASOPHILS NFR BLD AUTO: 0.3 %
BILIRUB DIRECT SERPL-MCNC: 0.2 MG/DL (ref 0–0.2)
BILIRUB SERPL-MCNC: 0.7 MG/DL (ref 0.2–1.3)
BUN SERPL-MCNC: 36 MG/DL (ref 7–30)
CALCIUM SERPL-MCNC: 8.7 MG/DL (ref 8.5–10.1)
CHLORIDE SERPL-SCNC: 110 MMOL/L (ref 94–109)
CO2 SERPL-SCNC: 23 MMOL/L (ref 20–32)
CREAT SERPL-MCNC: 1.39 MG/DL (ref 0.66–1.25)
DIFFERENTIAL METHOD BLD: NORMAL
EOSINOPHIL # BLD AUTO: 0 10E9/L (ref 0–0.7)
EOSINOPHIL NFR BLD AUTO: 1.2 %
ERYTHROCYTE [DISTWIDTH] IN BLOOD BY AUTOMATED COUNT: 14.2 % (ref 10–15)
GFR SERPL CREATININE-BSD FRML MDRD: 53 ML/MIN/1.7M2
GLUCOSE SERPL-MCNC: 302 MG/DL (ref 70–99)
HCT VFR BLD AUTO: 31.6 % (ref 40–53)
HGB BLD-MCNC: 10.6 G/DL (ref 13.3–17.7)
IMM GRANULOCYTES # BLD: 0 10E9/L (ref 0–0.4)
IMM GRANULOCYTES NFR BLD: 0.6 %
LYMPHOCYTES # BLD AUTO: 1.1 10E9/L (ref 0.8–5.3)
LYMPHOCYTES NFR BLD AUTO: 33.3 %
MAGNESIUM SERPL-MCNC: 1.7 MG/DL (ref 1.6–2.3)
MCH RBC QN AUTO: 31.5 PG (ref 26.5–33)
MCHC RBC AUTO-ENTMCNC: 33.5 G/DL (ref 31.5–36.5)
MCV RBC AUTO: 94 FL (ref 78–100)
MONOCYTES # BLD AUTO: 0.2 10E9/L (ref 0–1.3)
MONOCYTES NFR BLD AUTO: 7.3 %
NEUTROPHILS # BLD AUTO: 1.9 10E9/L (ref 1.6–8.3)
NEUTROPHILS NFR BLD AUTO: 57.3 %
NRBC # BLD AUTO: 0 10*3/UL
NRBC BLD AUTO-RTO: 0 /100
PHOSPHATE SERPL-MCNC: 3.5 MG/DL (ref 2.5–4.5)
PLATELET # BLD AUTO: 55 10E9/L (ref 150–450)
POTASSIUM SERPL-SCNC: 5.9 MMOL/L (ref 3.4–5.3)
PROT SERPL-MCNC: 6.8 G/DL (ref 6.8–8.8)
RBC # BLD AUTO: 3.37 10E12/L (ref 4.4–5.9)
SODIUM SERPL-SCNC: 138 MMOL/L (ref 133–144)
TACROLIMUS BLD-MCNC: 5.6 UG/L (ref 5–15)
TME LAST DOSE: NORMAL H
WBC # BLD AUTO: 3.3 10E9/L (ref 4–11)

## 2018-06-29 PROCEDURE — 80197 ASSAY OF TACROLIMUS: CPT | Performed by: INTERNAL MEDICINE

## 2018-06-29 PROCEDURE — 85004 AUTOMATED DIFF WBC COUNT: CPT

## 2018-06-29 PROCEDURE — 84155 ASSAY OF PROTEIN SERUM: CPT

## 2018-06-29 PROCEDURE — 83735 ASSAY OF MAGNESIUM: CPT

## 2018-06-29 PROCEDURE — 82248 BILIRUBIN DIRECT: CPT

## 2018-06-29 PROCEDURE — 84460 ALANINE AMINO (ALT) (SGPT): CPT

## 2018-06-29 PROCEDURE — 84450 TRANSFERASE (AST) (SGOT): CPT

## 2018-06-29 PROCEDURE — 84075 ASSAY ALKALINE PHOSPHATASE: CPT

## 2018-06-29 PROCEDURE — 82247 BILIRUBIN TOTAL: CPT

## 2018-06-29 PROCEDURE — 36415 COLL VENOUS BLD VENIPUNCTURE: CPT

## 2018-06-29 PROCEDURE — 85027 COMPLETE CBC AUTOMATED: CPT

## 2018-06-29 PROCEDURE — 80069 RENAL FUNCTION PANEL: CPT

## 2018-07-03 ENCOUNTER — TELEPHONE (OUTPATIENT)
Dept: TRANSPLANT | Facility: CLINIC | Age: 53
End: 2018-07-03

## 2018-07-03 ENCOUNTER — HOSPITAL ENCOUNTER (OUTPATIENT)
Dept: LAB | Facility: CLINIC | Age: 53
Discharge: HOME | End: 2018-07-03
Admitting: INTERNAL MEDICINE
Payer: COMMERCIAL

## 2018-07-03 DIAGNOSIS — Z79.60 LONG-TERM USE OF IMMUNOSUPPRESSANT MEDICATION: ICD-10-CM

## 2018-07-03 DIAGNOSIS — N18.30 CHRONIC KIDNEY DISEASE, STAGE 3 (MODERATE): ICD-10-CM

## 2018-07-03 DIAGNOSIS — Z94.4 LIVER REPLACED BY TRANSPLANT (H): ICD-10-CM

## 2018-07-03 DIAGNOSIS — Z94.4 HISTORY OF LIVER TRANSPLANT (H): ICD-10-CM

## 2018-07-03 DIAGNOSIS — D72.819 DECREASED WHITE BLOOD CELL COUNT: ICD-10-CM

## 2018-07-03 DIAGNOSIS — Z94.4 LIVER TRANSPLANT RECIPIENT (H): ICD-10-CM

## 2018-07-03 LAB
ALBUMIN SERPL-MCNC: 3.8 G/DL (ref 3.4–5)
ALP SERPL-CCNC: 496 U/L (ref 40–150)
ALT SERPL W P-5'-P-CCNC: 100 U/L (ref 0–70)
ANION GAP SERPL CALCULATED.3IONS-SCNC: 7 MMOL/L (ref 3–14)
AST SERPL W P-5'-P-CCNC: 85 U/L (ref 0–45)
BASOPHILS # BLD AUTO: 0 10E9/L (ref 0–0.2)
BASOPHILS NFR BLD AUTO: 0.3 %
BILIRUB DIRECT SERPL-MCNC: 0.3 MG/DL (ref 0–0.2)
BILIRUB SERPL-MCNC: 0.8 MG/DL (ref 0.2–1.3)
BUN SERPL-MCNC: 36 MG/DL (ref 7–30)
CALCIUM SERPL-MCNC: 8.4 MG/DL (ref 8.5–10.1)
CHLORIDE SERPL-SCNC: 107 MMOL/L (ref 94–109)
CO2 SERPL-SCNC: 23 MMOL/L (ref 20–32)
CREAT SERPL-MCNC: 1.44 MG/DL (ref 0.66–1.25)
CREAT UR-MCNC: 115 MG/DL
DIFFERENTIAL METHOD BLD: NORMAL
EOSINOPHIL # BLD AUTO: 0 10E9/L (ref 0–0.7)
EOSINOPHIL NFR BLD AUTO: 1.1 %
ERYTHROCYTE [DISTWIDTH] IN BLOOD BY AUTOMATED COUNT: 14.2 % (ref 10–15)
GFR SERPL CREATININE-BSD FRML MDRD: 51 ML/MIN/1.7M2
GLUCOSE SERPL-MCNC: 315 MG/DL (ref 70–99)
HCT VFR BLD AUTO: 35 % (ref 40–53)
HGB BLD-MCNC: 11.4 G/DL (ref 13.3–17.7)
IMM GRANULOCYTES # BLD: 0 10E9/L (ref 0–0.4)
IMM GRANULOCYTES NFR BLD: 0.5 %
LYMPHOCYTES # BLD AUTO: 1.2 10E9/L (ref 0.8–5.3)
LYMPHOCYTES NFR BLD AUTO: 32 %
MAGNESIUM SERPL-MCNC: 1.6 MG/DL (ref 1.6–2.3)
MCH RBC QN AUTO: 31 PG (ref 26.5–33)
MCHC RBC AUTO-ENTMCNC: 32.6 G/DL (ref 31.5–36.5)
MCV RBC AUTO: 95 FL (ref 78–100)
MONOCYTES # BLD AUTO: 0.3 10E9/L (ref 0–1.3)
MONOCYTES NFR BLD AUTO: 6.7 %
NEUTROPHILS # BLD AUTO: 2.2 10E9/L (ref 1.6–8.3)
NEUTROPHILS NFR BLD AUTO: 59.4 %
NRBC # BLD AUTO: 0 10*3/UL
NRBC BLD AUTO-RTO: 0 /100
PHOSPHATE SERPL-MCNC: 3.3 MG/DL (ref 2.5–4.5)
PLATELET # BLD AUTO: 61 10E9/L (ref 150–450)
POTASSIUM SERPL-SCNC: 5.1 MMOL/L (ref 3.4–5.3)
PROT SERPL-MCNC: 7.2 G/DL (ref 6.8–8.8)
PROT UR-MCNC: 0.96 G/L
PROT/CREAT 24H UR: 0.84 G/G CR (ref 0–0.2)
RBC # BLD AUTO: 3.68 10E12/L (ref 4.4–5.9)
SODIUM SERPL-SCNC: 137 MMOL/L (ref 133–144)
TACROLIMUS BLD-MCNC: 5.2 UG/L (ref 5–15)
TME LAST DOSE: NORMAL H
WBC # BLD AUTO: 3.7 10E9/L (ref 4–11)

## 2018-07-03 PROCEDURE — 36415 COLL VENOUS BLD VENIPUNCTURE: CPT | Performed by: INTERNAL MEDICINE

## 2018-07-03 PROCEDURE — 80197 ASSAY OF TACROLIMUS: CPT | Performed by: INTERNAL MEDICINE

## 2018-07-03 PROCEDURE — 84100 ASSAY OF PHOSPHORUS: CPT | Performed by: INTERNAL MEDICINE

## 2018-07-03 PROCEDURE — 85004 AUTOMATED DIFF WBC COUNT: CPT | Performed by: INTERNAL MEDICINE

## 2018-07-03 PROCEDURE — 85027 COMPLETE CBC AUTOMATED: CPT | Performed by: INTERNAL MEDICINE

## 2018-07-03 PROCEDURE — 84156 ASSAY OF PROTEIN URINE: CPT

## 2018-07-03 PROCEDURE — 80048 BASIC METABOLIC PNL TOTAL CA: CPT | Performed by: INTERNAL MEDICINE

## 2018-07-03 PROCEDURE — 80076 HEPATIC FUNCTION PANEL: CPT | Performed by: INTERNAL MEDICINE

## 2018-07-03 PROCEDURE — 83735 ASSAY OF MAGNESIUM: CPT | Performed by: INTERNAL MEDICINE

## 2018-07-03 NOTE — LETTER
July 3, 2018          To whom it may concern:      Camacho Bhagat received a liver transplant at the [facility] in 2017.  Post transplant he required additional abdominal surgery due to complications of his transplant.  He continues to recover from these surgeries.    Thank you for considering Camacho's circumstances            Abril Doty RN, BSN  Transplant Coordinator  [facility]  07/03/2018 1:00 PM

## 2018-07-06 ENCOUNTER — OFFICE VISIT (OUTPATIENT)
Dept: NEPHROLOGY | Facility: CLINIC | Age: 53
End: 2018-07-06
Payer: COMMERCIAL

## 2018-07-06 VITALS
OXYGEN SATURATION: 100 % | RESPIRATION RATE: 18 BRPM | TEMPERATURE: 98 F | HEART RATE: 78 BPM | WEIGHT: 223 LBS | HEIGHT: 72 IN | DIASTOLIC BLOOD PRESSURE: 91 MMHG | BODY MASS INDEX: 30.2 KG/M2 | SYSTOLIC BLOOD PRESSURE: 169 MMHG

## 2018-07-06 DIAGNOSIS — Z94.4 HISTORY OF LIVER TRANSPLANT (H): ICD-10-CM

## 2018-07-06 DIAGNOSIS — N18.30 CKD (CHRONIC KIDNEY DISEASE) STAGE 3, GFR 30-59 ML/MIN (H): Primary | ICD-10-CM

## 2018-07-06 DIAGNOSIS — D62 ANEMIA DUE TO BLOOD LOSS, ACUTE: ICD-10-CM

## 2018-07-06 DIAGNOSIS — R19.7 DIARRHEA: ICD-10-CM

## 2018-07-06 DIAGNOSIS — N18.30 CKD (CHRONIC KIDNEY DISEASE) STAGE 3, GFR 30-59 ML/MIN (H): ICD-10-CM

## 2018-07-06 DIAGNOSIS — Z94.4 LIVER TRANSPLANT RECIPIENT (H): ICD-10-CM

## 2018-07-06 DIAGNOSIS — Z79.60 LONG-TERM USE OF IMMUNOSUPPRESSANT MEDICATION: ICD-10-CM

## 2018-07-06 DIAGNOSIS — Z94.4 LIVER REPLACED BY TRANSPLANT (H): ICD-10-CM

## 2018-07-06 LAB
ALBUMIN SERPL-MCNC: 3.8 G/DL (ref 3.4–5)
ALP SERPL-CCNC: 503 U/L (ref 40–150)
ALT SERPL W P-5'-P-CCNC: 92 U/L (ref 0–70)
ANION GAP SERPL CALCULATED.3IONS-SCNC: 7 MMOL/L (ref 3–14)
AST SERPL W P-5'-P-CCNC: 74 U/L (ref 0–45)
BILIRUB DIRECT SERPL-MCNC: 0.3 MG/DL (ref 0–0.2)
BILIRUB SERPL-MCNC: 0.8 MG/DL (ref 0.2–1.3)
BUN SERPL-MCNC: 39 MG/DL (ref 7–30)
C DIFF TOX B STL QL: NEGATIVE
CALCIUM SERPL-MCNC: 9 MG/DL (ref 8.5–10.1)
CHLORIDE SERPL-SCNC: 111 MMOL/L (ref 94–109)
CO2 SERPL-SCNC: 23 MMOL/L (ref 20–32)
CREAT SERPL-MCNC: 1.37 MG/DL (ref 0.66–1.25)
ERYTHROCYTE [DISTWIDTH] IN BLOOD BY AUTOMATED COUNT: 13.7 % (ref 10–15)
GFR SERPL CREATININE-BSD FRML MDRD: 54 ML/MIN/1.7M2
GLUCOSE SERPL-MCNC: 244 MG/DL (ref 70–99)
HCT VFR BLD AUTO: 33.1 % (ref 40–53)
HGB BLD-MCNC: 11.3 G/DL (ref 13.3–17.7)
MAGNESIUM SERPL-MCNC: 1.9 MG/DL (ref 1.6–2.3)
MCH RBC QN AUTO: 31.6 PG (ref 26.5–33)
MCHC RBC AUTO-ENTMCNC: 34.1 G/DL (ref 31.5–36.5)
MCV RBC AUTO: 93 FL (ref 78–100)
PHOSPHATE SERPL-MCNC: 3.1 MG/DL (ref 2.5–4.5)
PLATELET # BLD AUTO: 58 10E9/L (ref 150–450)
POTASSIUM SERPL-SCNC: 5.2 MMOL/L (ref 3.4–5.3)
PROT SERPL-MCNC: 7.4 G/DL (ref 6.8–8.8)
RBC # BLD AUTO: 3.58 10E12/L (ref 4.4–5.9)
SODIUM SERPL-SCNC: 140 MMOL/L (ref 133–144)
SPECIMEN SOURCE: NORMAL
TACROLIMUS BLD-MCNC: 5.5 UG/L (ref 5–15)
TME LAST DOSE: 2230 H
WBC # BLD AUTO: 3.5 10E9/L (ref 4–11)

## 2018-07-06 PROCEDURE — 36415 COLL VENOUS BLD VENIPUNCTURE: CPT

## 2018-07-06 PROCEDURE — 83735 ASSAY OF MAGNESIUM: CPT

## 2018-07-06 PROCEDURE — 80197 ASSAY OF TACROLIMUS: CPT | Performed by: INTERNAL MEDICINE

## 2018-07-06 PROCEDURE — 82247 BILIRUBIN TOTAL: CPT

## 2018-07-06 PROCEDURE — 84460 ALANINE AMINO (ALT) (SGPT): CPT

## 2018-07-06 PROCEDURE — 82248 BILIRUBIN DIRECT: CPT

## 2018-07-06 PROCEDURE — 84450 TRANSFERASE (AST) (SGOT): CPT

## 2018-07-06 PROCEDURE — 84155 ASSAY OF PROTEIN SERUM: CPT

## 2018-07-06 PROCEDURE — 80069 RENAL FUNCTION PANEL: CPT

## 2018-07-06 PROCEDURE — 87493 C DIFF AMPLIFIED PROBE: CPT | Performed by: INTERNAL MEDICINE

## 2018-07-06 PROCEDURE — G0463 HOSPITAL OUTPT CLINIC VISIT: HCPCS | Mod: ZF

## 2018-07-06 PROCEDURE — 84075 ASSAY ALKALINE PHOSPHATASE: CPT

## 2018-07-06 PROCEDURE — 85027 COMPLETE CBC AUTOMATED: CPT

## 2018-07-06 ASSESSMENT — PAIN SCALES - GENERAL: PAINLEVEL: NO PAIN (0)

## 2018-07-06 NOTE — MR AVS SNAPSHOT
After Visit Summary   7/6/2018    Camacho Bhagat    MRN: [id number]           Patient Information     Date Of Birth          1965        Visit Information        Provider Department      7/6/2018 12:00 PM Cidna Cazares NP [facility] Nephrology        Today's Diagnoses     CKD (chronic kidney disease) stage 3, GFR 30-59 ml/min    -  1    Anemia due to blood loss, acute           Follow-ups after your visit        Your next 10 appointments already scheduled     Aug 20, 2018  9:30 AM CDT   (Arrive by 9:15 AM)   RETURN ENDOCRINE with Alisa West MD   [facility] Endocrinology ([facility])    [address]  3rd Floor  [place] [id number] [phone]            Aug 22, 2018 12:00 PM CDT   (Arrive by 11:30 AM)   Return Visit with Cinda Cazares NP   [facility] Nephrology ([facility])    [address]  [address]  [place] [id number] [phone]            Sep 05, 2018 12:30 PM CDT   (Arrive by 12:15 PM)   Return Liver Transplant with Toñito Camejo MD   [facility] Hepatology ([facility])    [address]  [address]  [place] [id number] [phone]              Future tests that were ordered for you today     Open Standing Orders        Priority Remaining Interval Expires Ordered    Renal panel Routine 99/99  7/7/2019 7/7/2018          Open Future Orders        Priority Expected Expires Ordered    **Iron and iron binding capacity FUTURE anytime Routine 7/7/2018 7/7/2019 7/7/2018    Ferritin Routine  7/7/2019 7/7/2018    Transferrin Routine  7/7/2019 7/7/2018    Protein  random urine with Creat Ratio Routine 8/22/2018 7/7/2019 7/7/2018            Who to contact     If you have questions or need follow up information about today's clinic visit or your schedule please contact [facility] NEPHROLOGY directly at [phone].  Normal or non-critical lab and imaging results will  "be communicated to you by [employer], letter or phone within 4 business days after the clinic has received the results. If you do not hear from us within 7 days, please contact the clinic through [employer] or phone. If you have a critical or abnormal lab result, we will notify you by phone as soon as possible.  Submit refill requests through [employer]! or call your pharmacy and they will forward the refill request to us. Please allow 3 business days for your refill to be completed.          Additional Information About Your Visit        Y[employer]! Information     [employer] gives you secure access to your electronic health record. If you see a primary care provider, you can also send messages to your care team and make appointments. If you have questions, please call your primary care clinic.  If you do not have a primary care provider, please call [phone] and they will assist you.        Care EveryWhere ID     This is your Care EveryWhere ID. This could be used by other organizations to access your [facility] medical records  [id number]        Your Vitals Were     Pulse Temperature Respirations Height Pulse Oximetry BMI (Body Mass Index)    78 98  F (36.7  C) (Oral) 18 1.829 m (6' 0.01\") 100% 30.24 kg/m2       Blood Pressure from Last 3 Encounters:   07/06/18 (!) 169/91   06/06/18 161/83   06/01/18 124/78    Weight from Last 3 Encounters:   07/06/18 101.2 kg (223 lb)   06/06/18 100.6 kg (221 lb 11.2 oz)   05/22/18 98.1 kg (216 lb 4.3 oz)                 Today's Medication Changes          These changes are accurate as of 7/6/18 11:59 PM.  If you have any questions, ask your nurse or doctor.               These medicines have changed or have updated prescriptions.        Dose/Directions    amLODIPine 10 MG tablet   Commonly known as:  NORVASC   This may have changed:  when to take this   Used for:  HTN (hypertension)        Dose:  10 mg   Take 1 tablet (10 mg) by mouth daily   Quantity:  90 tablet   Refills:  3       " cholecalciferol 1000 UNIT tablet   Commonly known as:  vitamin D3   This may have changed:  when to take this   Used for:  Vitamin D deficiency        Dose:  1000 Units   Take 1 tablet (1,000 Units) by mouth daily   Quantity:  100 tablet   Refills:  3       cyclobenzaprine 10 MG tablet   Commonly known as:  FLEXERIL   This may have changed:  when to take this   Used for:  Immunosuppression (H)        Dose:  10 mg   Take 1 tablet (10 mg) by mouth 3 times daily as needed for muscle spasms   Quantity:  90 tablet   Refills:  0       linagliptin 5 MG Tabs tablet   Commonly known as:  TRADJENTA   This may have changed:  when to take this   Used for:  Type 2 diabetes mellitus with diabetic polyneuropathy, with long-term current use of insulin (H)        Dose:  5 mg   Take 1 tablet (5 mg) by mouth daily   Quantity:  90 tablet   Refills:  3       losartan 25 MG tablet   Commonly known as:  COZAAR   This may have changed:  when to take this   Used for:  Persistent proteinuria, Hyperkalemia, Benign essential hypertension        Dose:  25 mg   Take 1 tablet (25 mg) by mouth daily   Quantity:  30 tablet   Refills:  11       metoprolol succinate 50 MG 24 hr tablet   Commonly known as:  TOPROL-XL   This may have changed:  when to take this   Used for:  Benign essential hypertension, Persistent proteinuria, Hyperkalemia        Dose:  50 mg   Take 1 tablet (50 mg) by mouth daily   Quantity:  30 tablet   Refills:  11       mycophenolic acid 360 MG EC tablet   Commonly known as:  GENERIC EQUIVALENT   This may have changed:  when to take this   Used for:  History of liver transplant (H), Long-term use of immunosuppressant medication        Dose:  720 mg   Take 2 tablets (720 mg) by mouth every 12 hours   Quantity:  120 tablet   Refills:  3       predniSONE 2.5 MG tablet   Commonly known as:  DELTASONE   This may have changed:  when to take this   Used for:  Liver replaced by transplant (H), Long-term use of immunosuppressant medication         Dose:  2.5 mg   Take 1 tablet (2.5 mg) by mouth daily   Quantity:  90 tablet   Refills:  3                Primary Care Provider Office Phone # Fax #    Shay Kirkpatrick [phone]       [address] [place] [address]  [facility] [address]        Equal Access to Services     FRANKLIN PORTILLO : Hadii aad ku hadasho Soomaali, waaxda luqadaha, qaybta kaalmada adeegyada, waxamanuel smith karenn gilberto markosbev duckworth. [facility] [phone].    ATENCIÓN: Si habla español, tiene a zheng disposición servicios gratuitos de asistencia lingüística. [facility] [phone].    We comply with applicable federal civil rights laws and [place] laws. We do not discriminate on the basis of race, color, national origin, age, disability, sex, sexual orientation, or gender identity.            Thank you!     Thank you for choosing [facility] NEPHROLOGY  for your care. Our goal is always to provide you with excellent care. Hearing back from our patients is one way we can continue to improve our services. Please take a few minutes to complete the written survey that you may receive in the mail after your visit with us. Thank you!             Your Updated Medication List - Protect others around you: Learn how to safely use, store and throw away your medicines at www.disposemymeds.org.          This list is accurate as of 7/6/18 11:59 PM.  Always use your most recent med list.                   Brand Name Dispense Instructions for use Diagnosis    amLODIPine 10 MG tablet    NORVASC    90 tablet    Take 1 tablet (10 mg) by mouth daily    HTN (hypertension)       BLOOD GLUCOSE TEST STRIPS Strp     100 each    1 strip by Lancet route 4 times daily    Type 2 diabetes mellitus with diabetic polyneuropathy, with long-term current use of insulin (H)       cholecalciferol 1000 UNIT tablet    vitamin D3    100 tablet    Take 1 tablet (1,000 Units) by mouth daily    Vitamin D deficiency       CIALIS 5 MG tablet   Generic drug:  tadalafil      Take 5  mg by mouth as needed        cyclobenzaprine 10 MG tablet    FLEXERIL    90 tablet    Take 1 tablet (10 mg) by mouth 3 times daily as needed for muscle spasms    Immunosuppression (H)       furosemide 40 MG tablet    LASIX    60 tablet    Take 1 tablet (40 mg) by mouth 2 times daily    Hyperkalemia, Persistent proteinuria, Benign essential hypertension       GABAPENTIN PO      Take 300 mg by mouth 3 times daily        insulin glargine 100 UNIT/ML injection    LANTUS    18 mL    Inject 50 Units Subcutaneous every morning    Type 2 diabetes mellitus with diabetic polyneuropathy, with long-term current use of insulin (H)       insulin pen needle 32G X 4 MM    BD ENE U/F    100 each    Use 1 pen needles daily or as directed.    Type 2 diabetes mellitus with diabetic polyneuropathy, with long-term current use of insulin (H)       linagliptin 5 MG Tabs tablet    TRADJENTA    90 tablet    Take 1 tablet (5 mg) by mouth daily    Type 2 diabetes mellitus with diabetic polyneuropathy, with long-term current use of insulin (H)       losartan 25 MG tablet    COZAAR    30 tablet    Take 1 tablet (25 mg) by mouth daily    Persistent proteinuria, Hyperkalemia, Benign essential hypertension       metoprolol succinate 50 MG 24 hr tablet    TOPROL-XL    30 tablet    Take 1 tablet (50 mg) by mouth daily    Benign essential hypertension, Persistent proteinuria, Hyperkalemia       Multi-vitamin Tabs tablet      Take 1 tablet by mouth every morning        mycophenolic acid 360 MG EC tablet    GENERIC EQUIVALENT    120 tablet    Take 2 tablets (720 mg) by mouth every 12 hours    History of liver transplant (H), Long-term use of immunosuppressant medication       omeprazole 20 MG CR capsule    priLOSEC    60 capsule    Take 1 capsule (20 mg) by mouth 2 times daily    Long-term use of immunosuppressant medication, History of liver transplant (H)       patiromer 8.4 g packet    VELTASSA    30 each    Take 8.4 g by mouth daily    Hyperkalemia        predniSONE 2.5 MG tablet    DELTASONE    90 tablet    Take 1 tablet (2.5 mg) by mouth daily    Liver replaced by transplant (H), Long-term use of immunosuppressant medication       tacrolimus 1 MG capsule    GENERIC EQUIVALENT    210 capsule    Take 4mg (4 capsules) in the am, take 3mg (3capsules) in the pm, take every 12 hours    Liver transplant recipient (H)

## 2018-07-07 NOTE — PROGRESS NOTES
Nephrology Clinic Visit 7/6/18    Assessment and Plan:       1. CKD3b w/proteinuria - Creat 1.3. Baseline in the 1.3 range.    Etiology of CKD likely multifactorial; DM, recurrent EJ, CNI. Has been intolerant of ACE/ARB previously given problems with hyperkalemia but retrial when TAC level was <0.3 did not result in Hyperkalemia. In fact Valtessa had been discontinued because he was becoming hypokalemic. Current blood pressures not at goal given that I have been holding Losartan. TAC dose has been increased given mild rejection and levels have been in the 5-7 range with undetermined goal at this time.      - Patient developed significant unexplained proteinuria at the end of January 2018 following his ileostomy takedown. Baseline Urine protein/creat had been in the 0.8 g/gCr range and then jumped to 9.57 g/gCr on 1/31/18. Blood pressures were in the 160-170/ range at that time. SPEP/immunofixation neg for monoclonal protein, Kappa/Lambda light chain ratio normal. On 2/28/18 Urine protein/creat ratio declined to 3.59 g/gCr. UA neg for hematuria. Patient was started on Losartan on 4/4/18 and now UPCR has declined further to 0.8 g/gCr which was his baseline.  A1c 5.5% in April.        - Patient feels strongly that the hyperkalemia is 2/2 increased TAC, not the ARB and he is willing to stay on Valtessa in order to continue the benefits of the ARB for treatment of his proteinuria and HTN       - Resume Losartan 25 mg qd. Would like to see his blood pressure in the 120-130/ range        - Will be having biweekly labs. I will contact him after my vacation, the week of 7/16/18 and we will review his blood pressures and labs    - Avoid NSAIDs, nephrotoxins   - A1c goal is 7%. HgA1c 5.5% April 2018   - B/P goal is < 130/80. Not at goal       2. HTN - Home blood pressures 120-150/ off Losartan. No edema. No dyspnea or CP. His weight has steadily increased following his ostomy takedown, up to 101.2 kg from 88.2 kg 2/18, but is  not fluid related.    Current antihypertensive regimen:      Lasix 40 mg bid     Amlodipine 10 mg qd      Toprol XL 50 mg qd.      Losartan 25 mg qd ( on hold)   - Resume Losartan   - Continue biweekly labs   - Continue to monitor blood pressures with resumption of Losartan.    - If he needs more control and K borderline would increase Toprol.       3. Hyperkalemia assoc with Tac - Unclear how much contribution there is from Losartan. Has been off Losartan since 6/26 and back on Valtassa. His TAC is 5.2 today and K 5.2. Had super response in his proteinuria with Losartan + improved blood pressure control. Patient very willing to continue and or increase Valtassa if needed to stay on ARB.    - Resume Losartan    - Will have chemistries drawn biweekly .       4. Volume status - Euvolemic. No edema/dyspnea. Does have increased stools when TAC dose is increased. See above regarding weight gain and blood pressures    - Continue Lasix 40 mg bid.       5. Acid base - No acute concerns. Metabolic acidosis resolved following ileostomy takedown. Bicarb 23.   Off supplement.       6. BMD - Ca 9.0, Phos 3.1, albumin 3.8   - Vitamin D 20 and PTH 34 (9/14/17)   - Continue Vit D 1000 U qd      7. Hypomagnesemia -Resolved. Mag 1.9 w/o replacement      8. Anemia - Recovered following surgery, Hgb up to 11.3.    - Will check iron studies next visit    - Not on iron and has not needed DIPAK      9. Liver transplant IS: Tacrolimus, MMF, Pred. Tac level 5.2   - Tacro dose increased given mild rejection in [place]      10. Dispo- RCT on 8/22/18 with Dr Rubio. Already has biweekly labs ordered.      Reason for Visit:  Hyperkalemia/CKD/HTN follow up        HPI:  Mr Bhagat is a 51 yo male in today for Hyperkalemia/HTN/CKD3 f/u. He has h/o hyperkalemia attributed to Tacrolimus and was on undetectable levels of Tac prior to recent finding of mild rejection. Tac level subsequently increased with rise in K once again. Also following closely for  unexplained nephrotic range proteinuria which developed post ostomy takedown in Jan 2018. Between improved blood pressure control and recent addition of ARB his UPCR has declined significantly.        Interval Hx:     Since our last visit patient was diagnosed with mild acute liver transplant rejection and Tac dose increased in early June. No Tac goal has been established and currently running in the 5-7 range.   He was started on Losartan during 4/4 visit with Dr Rubio for treatment of proteinuria  Hyperkalemia has once again become a problem, so I have had Camacho hold the Losartan since 6/26  Patient had restarted Valtassa about the same time Cozaar was stopped.   K was running in the 6 range with increased Tac + Losartan. Now off Losartan, on Valtassa with improved K.        ROS:  No complaints. Patient notes increased loose stools when Tac dose increased, otherwise feels well. Becoming alittle disheartened with the ongoing liver transplant issues. No dyspnea, CP, abdominal pain. Voiding w/o difficulty.  Active around the house. Appetite is good.         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 Former smoker, LPN and  by profession.     Allergies:  Allergies   Allergen Reactions     Erythromycin GI Disturbance     Vioxx      Nausea, vomiting       Medications:  Prior to Admission medications    Medication Sig Start Date End Date Taking? Authorizing Provider   amLODIPine (NORVASC) 10 MG tablet Take 1 tablet (10 mg) by mouth daily  Patient taking differently: Take 10 mg by mouth every morning  11/7/17  Yes Ninfa Hernández MD   cholecalciferol  (VITAMIN D3) 1000 UNIT tablet Take 1 tablet (1,000 Units) by mouth daily  Patient taking differently: Take 1,000 Units by mouth every morning  12/6/17  Yes Cinda Cazares NP   CIALIS 5 MG tablet Take 5 mg by mouth as needed  6/7/17  Yes Reported, Patient   cyclobenzaprine (FLEXERIL) 10 MG tablet Take 1 tablet (10 mg) by mouth 3 times daily as needed for muscle spasms  Patient taking differently: Take 10 mg by mouth as needed for muscle spasms  9/8/17  Yes Felicia Tolliver APRN CNP   furosemide (LASIX) 40 MG tablet Take 1 tablet (40 mg) by mouth 2 times daily 4/4/18  Yes Jael Rubio MD   GABAPENTIN PO Take 300 mg by mouth 3 times daily   Yes Reported, Patient   Glucose Blood (BLOOD GLUCOSE TEST STRIPS) STRP 1 strip by Lancet route 4 times daily 4/11/18  Yes Alisa West MD   insulin glargine (LANTUS) 100 UNIT/ML injection Inject 50 Units Subcutaneous every morning 4/11/18  Yes Alisa West MD   insulin pen needle (BD ENE U/F) 32G X 4 MM Use 1 pen needles daily or as directed. 4/11/18  Yes Alisa West MD   linagliptin (TRADJENTA) 5 MG TABS tablet Take 1 tablet (5 mg) by mouth daily  Patient taking differently: Take 5 mg by mouth every morning  4/11/18  Yes Alisa West MD   losartan (COZAAR) 25 MG tablet Take 1 tablet (25 mg) by mouth daily  Patient taking differently: Take 25 mg by mouth every morning  4/4/18  Yes Jael Rubio MD   metoprolol succinate (TOPROL-XL) 50 MG 24 hr tablet Take 1 tablet (50 mg) by mouth daily  Patient taking differently: Take 50 mg by mouth every morning  4/4/18  Yes Jael Rubio MD   multivitamin, therapeutic with minerals (MULTI-VITAMIN) TABS tablet Take 1 tablet by mouth every morning   Yes Reported, Patient   mycophenolic acid (GENERIC EQUIVALENT) 360 MG EC tablet Take 2 tablets (720 mg) by mouth every 12 hours  Patient taking differently: Take 720 mg by mouth 2 times daily  11/20/17  Yes  "Jovan Tran MD   omeprazole (PRILOSEC) 20 MG CR capsule Take 1 capsule (20 mg) by mouth 2 times daily 5/7/18  Yes Toñito Camejo MD   patiromer (VELTASSA) 8.4 g packet Take 8.4 g by mouth daily 6/25/18  Yes Jael Rubio MD   predniSONE (DELTASONE) 2.5 MG tablet Take 1 tablet (2.5 mg) by mouth daily  Patient taking differently: Take 2.5 mg by mouth every morning  2/5/18  Yes Jovan Tran MD   tacrolimus (GENERIC EQUIVALENT) 1 MG capsule Take 4mg (4 capsules) in the am, take 3mg (3capsules) in the pm, take every 12 hours 6/27/18  Yes Toñito Camejo MD       Vitals:  BP (!) 169/91 (BP Location: Right arm, Patient Position: Chair, Cuff Size: Adult Regular)  Pulse 78  Temp 98  F (36.7  C) (Oral)  Resp 18  Ht 1.829 m (6' 0.01\")  Wt 101.2 kg (223 lb)  SpO2 100%  BMI 30.24 kg/m2    Exam:  Gen: Calm, pleasant and interactive  CARDIAC: RRR  LUNGS: CTA  ABDOMEN: Abdomin NT. Non distended.  EXT: No edema     Results:  Results for RONNIE ARIZMENDI (MRN [id number]) as of 7/7/2018 08:43   Ref. Range 7/6/2018 10:33   Sodium Latest Ref Range: 133 - 144 mmol/L 140   Potassium Latest Ref Range: 3.4 - 5.3 mmol/L 5.2   Chloride Latest Ref Range: 94 - 109 mmol/L 111 (H)   Carbon Dioxide Latest Ref Range: 20 - 32 mmol/L 23   Urea Nitrogen Latest Ref Range: 7 - 30 mg/dL 39 (H)   Creatinine Latest Ref Range: 0.66 - 1.25 mg/dL 1.37 (H)   GFR Estimate Latest Ref Range: >60 mL/min/1.7m2 54 (L)   GFR Estimate If Black Latest Ref Range: >60 mL/min/1.7m2 66   Calcium Latest Ref Range: 8.5 - 10.1 mg/dL 9.0   Anion Gap Latest Ref Range: 3 - 14 mmol/L 7   Magnesium Latest Ref Range: 1.6 - 2.3 mg/dL 1.9   Phosphorus Latest Ref Range: 2.5 - 4.5 mg/dL 3.1   Albumin Latest Ref Range: 3.4 - 5.0 g/dL 3.8   Protein Total Latest Ref Range: 6.8 - 8.8 g/dL 7.4   Bilirubin Total Latest Ref Range: 0.2 - 1.3 mg/dL 0.8   Alkaline Phosphatase Latest Ref Range: 40 - 150 U/L 503 (H)   ALT Latest Ref Range: 0 - 70 U/L 92 (H)   AST " Latest Ref Range: 0 - 45 U/L 74 (H)   Bilirubin Direct Latest Ref Range: 0.0 - 0.2 mg/dL 0.3 (H)   Glucose Latest Ref Range: 70 - 99 mg/dL 244 (H)   WBC Latest Ref Range: 4.0 - 11.0 10e9/L 3.5 (L)   Hemoglobin Latest Ref Range: 13.3 - 17.7 g/dL 11.3 (L)   Hematocrit Latest Ref Range: 40.0 - 53.0 % 33.1 (L)   Platelet Count Latest Ref Range: 150 - 450 10e9/L 58 (L)   RBC Count Latest Ref Range: 4.4 - 5.9 10e12/L 3.58 (L)   MCV Latest Ref Range: 78 - 100 fl 93   MCH Latest Ref Range: 26.5 - 33.0 pg 31.6   MCHC Latest Ref Range: 31.5 - 36.5 g/dL 34.1   RDW Latest Ref Range: 10.0 - 15.0 % 13.7   Tacrolimus Last Dose Unknown 2230   Tacrolimus Level Latest Ref Range: 5.0 - 15.0 ug/L 5.5     Results for RONNIE ARIZMENDI (MRN [id number]) as of 7/7/2018 08:43   Ref. Range 7/3/2018 10:40   Creatinine Urine Latest Units: mg/dL 115   Protein Random Urine Latest Units: g/L 0.96   Protein Total Urine g/gr Creatinine Latest Ref Range: 0 - 0.2 g/g Cr 0.84 (H)

## 2018-07-09 ENCOUNTER — CARE COORDINATION (OUTPATIENT)
Dept: NEPHROLOGY | Facility: CLINIC | Age: 53
End: 2018-07-09

## 2018-07-09 NOTE — PROGRESS NOTES
Reason for Call    Patient seen on Friday by Jovanna Foster NP. I got a message from her saying that Camacho still hasn't heard from the pharmacy about scheduling his next Veltassa shipment. A script was sent for this on 6/25.    I called [facility]. The representative I spoke to confirmed they received the Rx and that it needs to be reviewed by/ signed off by the pharmacist. She wasn't sure why this wasn't done yet, but said she would get it sent to the pharmacist today and they should be able to all the patient in the next 24 hours to schedule his shipment.    I called Camacho galicia tamara galicia  with this update. I asked that he call be back if he does not hear from [facility] by Wednesday.    Quang Conteh, RN, BAN  Nephrology RN Care Coordinator

## 2018-07-10 ENCOUNTER — HOSPITAL ENCOUNTER (OUTPATIENT)
Dept: LAB | Facility: CLINIC | Age: 53
Discharge: HOME | End: 2018-07-10
Attending: INTERNAL MEDICINE
Payer: COMMERCIAL

## 2018-07-10 DIAGNOSIS — Z94.4 LIVER TRANSPLANT RECIPIENT (H): ICD-10-CM

## 2018-07-10 DIAGNOSIS — Z94.4 LIVER REPLACED BY TRANSPLANT (H): ICD-10-CM

## 2018-07-10 DIAGNOSIS — N18.30 CKD (CHRONIC KIDNEY DISEASE) STAGE 3, GFR 30-59 ML/MIN (H): ICD-10-CM

## 2018-07-10 DIAGNOSIS — D62 ANEMIA DUE TO BLOOD LOSS, ACUTE: ICD-10-CM

## 2018-07-10 LAB
ALBUMIN SERPL-MCNC: 3.7 G/DL (ref 3.4–5)
ALP SERPL-CCNC: 474 U/L (ref 40–150)
ALT SERPL W P-5'-P-CCNC: 90 U/L (ref 0–70)
ANION GAP SERPL CALCULATED.3IONS-SCNC: 6 MMOL/L (ref 3–14)
AST SERPL W P-5'-P-CCNC: 63 U/L (ref 0–45)
BILIRUB DIRECT SERPL-MCNC: 0.2 MG/DL (ref 0–0.2)
BILIRUB SERPL-MCNC: 0.9 MG/DL (ref 0.2–1.3)
BUN SERPL-MCNC: 38 MG/DL (ref 7–30)
CALCIUM SERPL-MCNC: 8.4 MG/DL (ref 8.5–10.1)
CHLORIDE SERPL-SCNC: 108 MMOL/L (ref 94–109)
CO2 SERPL-SCNC: 25 MMOL/L (ref 20–32)
CREAT SERPL-MCNC: 1.27 MG/DL (ref 0.66–1.25)
ERYTHROCYTE [DISTWIDTH] IN BLOOD BY AUTOMATED COUNT: 14 % (ref 10–15)
FERRITIN SERPL-MCNC: 1338 NG/ML (ref 26–388)
GFR SERPL CREATININE-BSD FRML MDRD: 59 ML/MIN/1.7M2
GLUCOSE SERPL-MCNC: 234 MG/DL (ref 70–99)
HCT VFR BLD AUTO: 34.4 % (ref 40–53)
HGB BLD-MCNC: 11.7 G/DL (ref 13.3–17.7)
IRON SATN MFR SERPL: 73 % (ref 15–46)
IRON SERPL-MCNC: 166 UG/DL (ref 35–180)
MAGNESIUM SERPL-MCNC: 1.6 MG/DL (ref 1.6–2.3)
MCH RBC QN AUTO: 31.6 PG (ref 26.5–33)
MCHC RBC AUTO-ENTMCNC: 34 G/DL (ref 31.5–36.5)
MCV RBC AUTO: 93 FL (ref 78–100)
PHOSPHATE SERPL-MCNC: 3.6 MG/DL (ref 2.5–4.5)
PLATELET # BLD AUTO: 71 10E9/L (ref 150–450)
POTASSIUM SERPL-SCNC: 4.9 MMOL/L (ref 3.4–5.3)
PROT SERPL-MCNC: 7 G/DL (ref 6.8–8.8)
RBC # BLD AUTO: 3.7 10E12/L (ref 4.4–5.9)
SODIUM SERPL-SCNC: 139 MMOL/L (ref 133–144)
TACROLIMUS BLD-MCNC: 4.4 UG/L (ref 5–15)
TIBC SERPL-MCNC: 227 UG/DL (ref 240–430)
TME LAST DOSE: ABNORMAL H
TRANSFERRIN SERPL-MCNC: 190 MG/DL (ref 210–360)
WBC # BLD AUTO: 4.3 10E9/L (ref 4–11)

## 2018-07-10 PROCEDURE — 84466 ASSAY OF TRANSFERRIN: CPT

## 2018-07-10 PROCEDURE — 84450 TRANSFERASE (AST) (SGOT): CPT

## 2018-07-10 PROCEDURE — 83550 IRON BINDING TEST: CPT

## 2018-07-10 PROCEDURE — 83735 ASSAY OF MAGNESIUM: CPT

## 2018-07-10 PROCEDURE — 84075 ASSAY ALKALINE PHOSPHATASE: CPT

## 2018-07-10 PROCEDURE — 36415 COLL VENOUS BLD VENIPUNCTURE: CPT | Performed by: INTERNAL MEDICINE

## 2018-07-10 PROCEDURE — 80197 ASSAY OF TACROLIMUS: CPT | Performed by: INTERNAL MEDICINE

## 2018-07-10 PROCEDURE — 84155 ASSAY OF PROTEIN SERUM: CPT

## 2018-07-10 PROCEDURE — 85027 COMPLETE CBC AUTOMATED: CPT

## 2018-07-10 PROCEDURE — 36415 COLL VENOUS BLD VENIPUNCTURE: CPT

## 2018-07-10 PROCEDURE — 83540 ASSAY OF IRON: CPT

## 2018-07-10 PROCEDURE — 84460 ALANINE AMINO (ALT) (SGPT): CPT

## 2018-07-10 PROCEDURE — 80069 RENAL FUNCTION PANEL: CPT

## 2018-07-10 PROCEDURE — 82248 BILIRUBIN DIRECT: CPT

## 2018-07-10 PROCEDURE — 82247 BILIRUBIN TOTAL: CPT

## 2018-07-10 PROCEDURE — 82728 ASSAY OF FERRITIN: CPT

## 2018-07-10 PROCEDURE — 80048 BASIC METABOLIC PNL TOTAL CA: CPT

## 2018-07-11 DIAGNOSIS — Z94.4 LIVER TRANSPLANT RECIPIENT (H): ICD-10-CM

## 2018-07-11 NOTE — TELEPHONE ENCOUNTER
Please call Camacho about tacrolimus level of 4.4, too low.  Verify current dose is 4mg in am, 3mg in pm, taking every 12 hours,  And increase dose to 4mg Q 12 hours.. !2 hour goal for tacrolimus 5-8.

## 2018-07-12 RX ORDER — TACROLIMUS 1 MG/1
4 CAPSULE ORAL EVERY 12 HOURS
Qty: 240 CAPSULE | Refills: 11 | Status: SHIPPED | OUTPATIENT
Start: 2018-07-12 | End: 2018-07-31

## 2018-07-12 NOTE — TELEPHONE ENCOUNTER
Left message with Camacho if he was at 4 mg in the AM, and 3 mg in the PM to change to 4 mg Q 12 hours now. Asked for a callback to verify he will do this.

## 2018-07-13 ENCOUNTER — HOSPITAL ENCOUNTER (OUTPATIENT)
Dept: LAB | Facility: CLINIC | Age: 53
Discharge: HOME | End: 2018-07-13
Attending: INTERNAL MEDICINE
Payer: COMMERCIAL

## 2018-07-13 DIAGNOSIS — N18.30 CKD (CHRONIC KIDNEY DISEASE) STAGE 3, GFR 30-59 ML/MIN (H): ICD-10-CM

## 2018-07-13 DIAGNOSIS — Z94.4 LIVER REPLACED BY TRANSPLANT (H): ICD-10-CM

## 2018-07-13 DIAGNOSIS — Z94.4 LIVER TRANSPLANT RECIPIENT (H): ICD-10-CM

## 2018-07-13 LAB
ALBUMIN SERPL-MCNC: 3.5 G/DL (ref 3.4–5)
ALP SERPL-CCNC: 428 U/L (ref 40–150)
ALT SERPL W P-5'-P-CCNC: 74 U/L (ref 0–70)
ANION GAP SERPL CALCULATED.3IONS-SCNC: 6 MMOL/L (ref 3–14)
AST SERPL W P-5'-P-CCNC: 43 U/L (ref 0–45)
BILIRUB DIRECT SERPL-MCNC: 0.2 MG/DL (ref 0–0.2)
BILIRUB SERPL-MCNC: 0.8 MG/DL (ref 0.2–1.3)
BUN SERPL-MCNC: 40 MG/DL (ref 7–30)
CALCIUM SERPL-MCNC: 8.5 MG/DL (ref 8.5–10.1)
CHLORIDE SERPL-SCNC: 108 MMOL/L (ref 94–109)
CO2 SERPL-SCNC: 24 MMOL/L (ref 20–32)
CREAT SERPL-MCNC: 1.17 MG/DL (ref 0.66–1.25)
ERYTHROCYTE [DISTWIDTH] IN BLOOD BY AUTOMATED COUNT: 13.7 % (ref 10–15)
GFR SERPL CREATININE-BSD FRML MDRD: 65 ML/MIN/1.7M2
GLUCOSE SERPL-MCNC: 295 MG/DL (ref 70–99)
HCT VFR BLD AUTO: 33.2 % (ref 40–53)
HGB BLD-MCNC: 10.8 G/DL (ref 13.3–17.7)
MAGNESIUM SERPL-MCNC: 1.8 MG/DL (ref 1.6–2.3)
MCH RBC QN AUTO: 30.9 PG (ref 26.5–33)
MCHC RBC AUTO-ENTMCNC: 32.5 G/DL (ref 31.5–36.5)
MCV RBC AUTO: 95 FL (ref 78–100)
PHOSPHATE SERPL-MCNC: 2.6 MG/DL (ref 2.5–4.5)
PLATELET # BLD AUTO: 64 10E9/L (ref 150–450)
POTASSIUM SERPL-SCNC: 5.3 MMOL/L (ref 3.4–5.3)
PROT SERPL-MCNC: 6.8 G/DL (ref 6.8–8.8)
RBC # BLD AUTO: 3.49 10E12/L (ref 4.4–5.9)
SODIUM SERPL-SCNC: 138 MMOL/L (ref 133–144)
TACROLIMUS BLD-MCNC: 4.2 UG/L (ref 5–15)
TME LAST DOSE: ABNORMAL H
WBC # BLD AUTO: 4.1 10E9/L (ref 4–11)

## 2018-07-13 PROCEDURE — 36415 COLL VENOUS BLD VENIPUNCTURE: CPT

## 2018-07-13 PROCEDURE — 85027 COMPLETE CBC AUTOMATED: CPT

## 2018-07-13 PROCEDURE — 84450 TRANSFERASE (AST) (SGOT): CPT

## 2018-07-13 PROCEDURE — 80069 RENAL FUNCTION PANEL: CPT

## 2018-07-13 PROCEDURE — 80048 BASIC METABOLIC PNL TOTAL CA: CPT

## 2018-07-13 PROCEDURE — 84155 ASSAY OF PROTEIN SERUM: CPT

## 2018-07-13 PROCEDURE — 80197 ASSAY OF TACROLIMUS: CPT | Performed by: INTERNAL MEDICINE

## 2018-07-13 PROCEDURE — 84460 ALANINE AMINO (ALT) (SGPT): CPT

## 2018-07-13 PROCEDURE — 82248 BILIRUBIN DIRECT: CPT

## 2018-07-13 PROCEDURE — 83735 ASSAY OF MAGNESIUM: CPT

## 2018-07-13 PROCEDURE — 82247 BILIRUBIN TOTAL: CPT

## 2018-07-13 PROCEDURE — 84075 ASSAY ALKALINE PHOSPHATASE: CPT

## 2018-07-16 ENCOUNTER — TELEPHONE (OUTPATIENT)
Dept: TRANSPLANT | Facility: CLINIC | Age: 53
End: 2018-07-16

## 2018-07-16 NOTE — TELEPHONE ENCOUNTER
Patient Call: General      Reason for call: Patient returned coordinators call    Call back needed? Yes    Return Call Needed  Same as documented in contacts section  When to return call?: Same day: Route [facility]

## 2018-07-17 ENCOUNTER — HOSPITAL ENCOUNTER (OUTPATIENT)
Dept: LAB | Facility: CLINIC | Age: 53
Discharge: HOME | End: 2018-07-17
Attending: INTERNAL MEDICINE
Payer: COMMERCIAL

## 2018-07-17 DIAGNOSIS — N18.30 CKD (CHRONIC KIDNEY DISEASE) STAGE 3, GFR 30-59 ML/MIN (H): ICD-10-CM

## 2018-07-17 DIAGNOSIS — Z94.4 LIVER TRANSPLANT RECIPIENT (H): ICD-10-CM

## 2018-07-17 DIAGNOSIS — Z94.4 LIVER REPLACED BY TRANSPLANT (H): ICD-10-CM

## 2018-07-17 LAB
ALBUMIN SERPL-MCNC: 3.5 G/DL (ref 3.4–5)
ALP SERPL-CCNC: 399 U/L (ref 40–150)
ALT SERPL W P-5'-P-CCNC: 74 U/L (ref 0–70)
ANION GAP SERPL CALCULATED.3IONS-SCNC: 4 MMOL/L (ref 3–14)
AST SERPL W P-5'-P-CCNC: 58 U/L (ref 0–45)
BILIRUB DIRECT SERPL-MCNC: 0.3 MG/DL (ref 0–0.2)
BILIRUB SERPL-MCNC: 0.6 MG/DL (ref 0.2–1.3)
BUN SERPL-MCNC: 36 MG/DL (ref 7–30)
CALCIUM SERPL-MCNC: 8.5 MG/DL (ref 8.5–10.1)
CHLORIDE SERPL-SCNC: 112 MMOL/L (ref 94–109)
CO2 SERPL-SCNC: 25 MMOL/L (ref 20–32)
CREAT SERPL-MCNC: 1.25 MG/DL (ref 0.66–1.25)
ERYTHROCYTE [DISTWIDTH] IN BLOOD BY AUTOMATED COUNT: 13.8 % (ref 10–15)
GFR SERPL CREATININE-BSD FRML MDRD: 60 ML/MIN/1.7M2
GLUCOSE SERPL-MCNC: 209 MG/DL (ref 70–99)
HCT VFR BLD AUTO: 33.1 % (ref 40–53)
HGB BLD-MCNC: 10.8 G/DL (ref 13.3–17.7)
MAGNESIUM SERPL-MCNC: 1.8 MG/DL (ref 1.6–2.3)
MCH RBC QN AUTO: 31.1 PG (ref 26.5–33)
MCHC RBC AUTO-ENTMCNC: 32.6 G/DL (ref 31.5–36.5)
MCV RBC AUTO: 95 FL (ref 78–100)
PHOSPHATE SERPL-MCNC: 3 MG/DL (ref 2.5–4.5)
PLATELET # BLD AUTO: 64 10E9/L (ref 150–450)
POTASSIUM SERPL-SCNC: 5.2 MMOL/L (ref 3.4–5.3)
PROT SERPL-MCNC: 6.6 G/DL (ref 6.8–8.8)
RBC # BLD AUTO: 3.47 10E12/L (ref 4.4–5.9)
SODIUM SERPL-SCNC: 141 MMOL/L (ref 133–144)
TACROLIMUS BLD-MCNC: 5.6 UG/L (ref 5–15)
TME LAST DOSE: NORMAL H
WBC # BLD AUTO: 3.6 10E9/L (ref 4–11)

## 2018-07-17 PROCEDURE — 82247 BILIRUBIN TOTAL: CPT

## 2018-07-17 PROCEDURE — 36415 COLL VENOUS BLD VENIPUNCTURE: CPT

## 2018-07-17 PROCEDURE — 84460 ALANINE AMINO (ALT) (SGPT): CPT

## 2018-07-17 PROCEDURE — 80069 RENAL FUNCTION PANEL: CPT

## 2018-07-17 PROCEDURE — 84450 TRANSFERASE (AST) (SGOT): CPT

## 2018-07-17 PROCEDURE — 80048 BASIC METABOLIC PNL TOTAL CA: CPT

## 2018-07-17 PROCEDURE — 84155 ASSAY OF PROTEIN SERUM: CPT

## 2018-07-17 PROCEDURE — 80197 ASSAY OF TACROLIMUS: CPT | Performed by: INTERNAL MEDICINE

## 2018-07-17 PROCEDURE — 84075 ASSAY ALKALINE PHOSPHATASE: CPT

## 2018-07-17 PROCEDURE — 82248 BILIRUBIN DIRECT: CPT

## 2018-07-17 PROCEDURE — 85027 COMPLETE CBC AUTOMATED: CPT

## 2018-07-17 PROCEDURE — 83735 ASSAY OF MAGNESIUM: CPT

## 2018-07-17 NOTE — TELEPHONE ENCOUNTER
Pt's prograf level 7/13 was not with new dose change from last week. Pt repeated labs today. Will await prograf level

## 2018-07-18 ENCOUNTER — CARE COORDINATION (OUTPATIENT)
Dept: NEPHROLOGY | Facility: CLINIC | Age: 53
End: 2018-07-18

## 2018-07-18 NOTE — PROGRESS NOTES
Reason for Call    Left VM for patient to return call.  Need to review home BP readings since re-starting losartan 25 mg daily 2 weeks ago.    Quang Conteh RN

## 2018-07-20 ENCOUNTER — HOSPITAL ENCOUNTER (OUTPATIENT)
Dept: LAB | Facility: CLINIC | Age: 53
Discharge: HOME | End: 2018-07-20
Attending: INTERNAL MEDICINE
Payer: COMMERCIAL

## 2018-07-20 DIAGNOSIS — N18.4 CHRONIC KIDNEY DISEASE, STAGE 4 (SEVERE) (H): ICD-10-CM

## 2018-07-20 DIAGNOSIS — Z94.4 LIVER REPLACED BY TRANSPLANT (H): ICD-10-CM

## 2018-07-20 DIAGNOSIS — Z94.4 LIVER TRANSPLANT RECIPIENT (H): ICD-10-CM

## 2018-07-20 LAB
ALBUMIN SERPL-MCNC: 3.6 G/DL (ref 3.4–5)
ALP SERPL-CCNC: 410 U/L (ref 40–150)
ALT SERPL W P-5'-P-CCNC: 90 U/L (ref 0–70)
ANION GAP SERPL CALCULATED.3IONS-SCNC: 6 MMOL/L (ref 3–14)
AST SERPL W P-5'-P-CCNC: 71 U/L (ref 0–45)
BILIRUB DIRECT SERPL-MCNC: 0.2 MG/DL (ref 0–0.2)
BILIRUB SERPL-MCNC: 0.7 MG/DL (ref 0.2–1.3)
BUN SERPL-MCNC: 33 MG/DL (ref 7–30)
CALCIUM SERPL-MCNC: 8.3 MG/DL (ref 8.5–10.1)
CHLORIDE SERPL-SCNC: 107 MMOL/L (ref 94–109)
CO2 SERPL-SCNC: 23 MMOL/L (ref 20–32)
CREAT SERPL-MCNC: 1.19 MG/DL (ref 0.66–1.25)
ERYTHROCYTE [DISTWIDTH] IN BLOOD BY AUTOMATED COUNT: 13.8 % (ref 10–15)
GFR SERPL CREATININE-BSD FRML MDRD: 64 ML/MIN/1.7M2
GLUCOSE SERPL-MCNC: 200 MG/DL (ref 70–99)
HCT VFR BLD AUTO: 32.1 % (ref 40–53)
HGB BLD-MCNC: 10.8 G/DL (ref 13.3–17.7)
MAGNESIUM SERPL-MCNC: 1.8 MG/DL (ref 1.6–2.3)
MCH RBC QN AUTO: 31.6 PG (ref 26.5–33)
MCHC RBC AUTO-ENTMCNC: 33.6 G/DL (ref 31.5–36.5)
MCV RBC AUTO: 94 FL (ref 78–100)
PHOSPHATE SERPL-MCNC: 2.7 MG/DL (ref 2.5–4.5)
PLATELET # BLD AUTO: 70 10E9/L (ref 150–450)
POTASSIUM SERPL-SCNC: 4.8 MMOL/L (ref 3.4–5.3)
PROT SERPL-MCNC: 6.9 G/DL (ref 6.8–8.8)
RBC # BLD AUTO: 3.42 10E12/L (ref 4.4–5.9)
SODIUM SERPL-SCNC: 136 MMOL/L (ref 133–144)
TACROLIMUS BLD-MCNC: 5.8 UG/L (ref 5–15)
TME LAST DOSE: NORMAL H
WBC # BLD AUTO: 3.6 10E9/L (ref 4–11)

## 2018-07-20 PROCEDURE — 36415 COLL VENOUS BLD VENIPUNCTURE: CPT

## 2018-07-20 PROCEDURE — 80069 RENAL FUNCTION PANEL: CPT

## 2018-07-20 PROCEDURE — 84075 ASSAY ALKALINE PHOSPHATASE: CPT

## 2018-07-20 PROCEDURE — 85027 COMPLETE CBC AUTOMATED: CPT

## 2018-07-20 PROCEDURE — 80197 ASSAY OF TACROLIMUS: CPT | Performed by: INTERNAL MEDICINE

## 2018-07-20 PROCEDURE — 82248 BILIRUBIN DIRECT: CPT

## 2018-07-20 PROCEDURE — 84155 ASSAY OF PROTEIN SERUM: CPT

## 2018-07-20 PROCEDURE — 83735 ASSAY OF MAGNESIUM: CPT

## 2018-07-20 PROCEDURE — 82247 BILIRUBIN TOTAL: CPT

## 2018-07-20 PROCEDURE — 84460 ALANINE AMINO (ALT) (SGPT): CPT

## 2018-07-20 PROCEDURE — 84450 TRANSFERASE (AST) (SGOT): CPT

## 2018-07-20 NOTE — PROGRESS NOTES
Patient returned call. Says his average blood pressure for 8 readings over the past 2 weeks is 136/72. Highest reading was 140/78 and lowest reading was 131/69. Heart rate 56-65. Denies edema or any new symptoms/ concerns. Update sent to Jovanna Foster NP.    Quang Conteh RN

## 2018-07-20 NOTE — PROGRESS NOTES
Reviewed with Jovanna. She doesn't recommend any changes at this time. Notified patient and recommended that he continue to monitor BP at home and notify me if he notices them trending higher again. Otherwise follow up as scheduled in August.    Quang Conteh RN

## 2018-07-23 ENCOUNTER — TELEPHONE (OUTPATIENT)
Dept: INTERNAL MEDICINE | Facility: CLINIC | Age: 53
End: 2018-07-23

## 2018-07-23 NOTE — TELEPHONE ENCOUNTER
[facility] Call Center    Phone Message    May a detailed message be left on voicemail: yes    Reason for Call: Other: Pt sent [employer] message to primary care to get appt with Dr. Kirkpatrick. Pt stated he received message back from Quang WATSON to schedule appt.     Action Taken: Message routed to:  [facility] ([facility]

## 2018-07-24 ENCOUNTER — HOSPITAL ENCOUNTER (OUTPATIENT)
Dept: LAB | Facility: CLINIC | Age: 53
Discharge: HOME | End: 2018-07-24
Attending: INTERNAL MEDICINE | Admitting: INTERNAL MEDICINE
Payer: COMMERCIAL

## 2018-07-24 DIAGNOSIS — Z94.4 LIVER TRANSPLANT RECIPIENT (H): ICD-10-CM

## 2018-07-24 DIAGNOSIS — Z94.4 LIVER REPLACED BY TRANSPLANT (H): ICD-10-CM

## 2018-07-24 DIAGNOSIS — N18.30 CKD (CHRONIC KIDNEY DISEASE) STAGE 3, GFR 30-59 ML/MIN (H): ICD-10-CM

## 2018-07-24 LAB
ALBUMIN SERPL-MCNC: 3.6 G/DL (ref 3.4–5)
ALP SERPL-CCNC: 441 U/L (ref 40–150)
ALT SERPL W P-5'-P-CCNC: 104 U/L (ref 0–70)
ANION GAP SERPL CALCULATED.3IONS-SCNC: 5 MMOL/L (ref 3–14)
AST SERPL W P-5'-P-CCNC: 82 U/L (ref 0–45)
BILIRUB DIRECT SERPL-MCNC: 0.3 MG/DL (ref 0–0.2)
BILIRUB SERPL-MCNC: 0.7 MG/DL (ref 0.2–1.3)
BUN SERPL-MCNC: 30 MG/DL (ref 7–30)
CALCIUM SERPL-MCNC: 8.5 MG/DL (ref 8.5–10.1)
CHLORIDE SERPL-SCNC: 106 MMOL/L (ref 94–109)
CO2 SERPL-SCNC: 25 MMOL/L (ref 20–32)
CREAT SERPL-MCNC: 1.19 MG/DL (ref 0.66–1.25)
ERYTHROCYTE [DISTWIDTH] IN BLOOD BY AUTOMATED COUNT: 13.6 % (ref 10–15)
GFR SERPL CREATININE-BSD FRML MDRD: 64 ML/MIN/1.7M2
GLUCOSE SERPL-MCNC: 285 MG/DL (ref 70–99)
HCT VFR BLD AUTO: 33.1 % (ref 40–53)
HGB BLD-MCNC: 11 G/DL (ref 13.3–17.7)
MAGNESIUM SERPL-MCNC: 1.8 MG/DL (ref 1.6–2.3)
MCH RBC QN AUTO: 31.1 PG (ref 26.5–33)
MCHC RBC AUTO-ENTMCNC: 33.2 G/DL (ref 31.5–36.5)
MCV RBC AUTO: 94 FL (ref 78–100)
PHOSPHATE SERPL-MCNC: 3.1 MG/DL (ref 2.5–4.5)
PLATELET # BLD AUTO: 66 10E9/L (ref 150–450)
POTASSIUM SERPL-SCNC: 5.2 MMOL/L (ref 3.4–5.3)
PROT SERPL-MCNC: 7 G/DL (ref 6.8–8.8)
RBC # BLD AUTO: 3.54 10E12/L (ref 4.4–5.9)
SODIUM SERPL-SCNC: 136 MMOL/L (ref 133–144)
TACROLIMUS BLD-MCNC: 4.2 UG/L (ref 5–15)
TME LAST DOSE: ABNORMAL H
WBC # BLD AUTO: 3.4 10E9/L (ref 4–11)

## 2018-07-24 PROCEDURE — 84460 ALANINE AMINO (ALT) (SGPT): CPT

## 2018-07-24 PROCEDURE — 85027 COMPLETE CBC AUTOMATED: CPT

## 2018-07-24 PROCEDURE — 80048 BASIC METABOLIC PNL TOTAL CA: CPT

## 2018-07-24 PROCEDURE — 84155 ASSAY OF PROTEIN SERUM: CPT

## 2018-07-24 PROCEDURE — 36415 COLL VENOUS BLD VENIPUNCTURE: CPT

## 2018-07-24 PROCEDURE — 83735 ASSAY OF MAGNESIUM: CPT

## 2018-07-24 PROCEDURE — 80197 ASSAY OF TACROLIMUS: CPT | Performed by: INTERNAL MEDICINE

## 2018-07-24 PROCEDURE — 84450 TRANSFERASE (AST) (SGOT): CPT

## 2018-07-24 PROCEDURE — 82248 BILIRUBIN DIRECT: CPT

## 2018-07-24 PROCEDURE — 84075 ASSAY ALKALINE PHOSPHATASE: CPT

## 2018-07-24 PROCEDURE — 80069 RENAL FUNCTION PANEL: CPT

## 2018-07-24 PROCEDURE — 82247 BILIRUBIN TOTAL: CPT

## 2018-07-24 NOTE — PROGRESS NOTES
[facility]  Primary Care Center   Bin Starr MD  07/25/2018      Chief Complaint:   Knee Pain       History of Present Illness:   Camacho Bhagat is a 52 year old male with a complex medical history who presents for evaluation of knee pain.    Camacho states he hurt his right knee pretty badly about one week ago. While he was walking his dog he noticed his knee twisted as he was walking straight. He is curious if he stepped in a gopher hole. Prior to this his knee was fine. After it hurts and is getting worse. Their is pain when stationary and lying in bed. He can walk but it still hurts. His knee is not puffy. He describes the area of pain as his hole knee. He did put IcyHot on it with minimal improvement. He did have a knee arthroscopy years ago.     Review of Systems:   Pertinent items are noted in HPI, remainder of complete ROS is negative.      Active Medications:      amLODIPine (NORVASC) 10 MG tablet, Take 1 tablet (10 mg) by mouth daily (Patient taking differently: Take 10 mg by mouth every morning ), Disp: 90 tablet, Rfl: 3     cholecalciferol (VITAMIN D3) 1000 UNIT tablet, Take 1 tablet (1,000 Units) by mouth daily (Patient taking differently: Take 1,000 Units by mouth every morning ), Disp: 100 tablet, Rfl: 3     CIALIS 5 MG tablet, Take 5 mg by mouth as needed , Disp: , Rfl: 1     cyclobenzaprine (FLEXERIL) 10 MG tablet, Take 1 tablet (10 mg) by mouth 3 times daily as needed for muscle spasms (Patient taking differently: Take 10 mg by mouth as needed for muscle spasms ), Disp: 90 tablet, Rfl: 0     furosemide (LASIX) 40 MG tablet, Take 1 tablet (40 mg) by mouth 2 times daily, Disp: 60 tablet, Rfl: 11     GABAPENTIN PO, Take 300 mg by mouth 3 times daily, Disp: , Rfl:      Glucose Blood (BLOOD GLUCOSE TEST STRIPS) STRP, 1 strip by Lancet route 4 times daily, Disp: 100 each, Rfl: 11     insulin glargine (LANTUS) 100 UNIT/ML injection, Inject 50 Units Subcutaneous every morning, Disp: 18 mL, Rfl: 11      insulin pen needle (BD ENE U/F) 32G X 4 MM, Use 1 pen needles daily or as directed., Disp: 100 each, Rfl: 3     linagliptin (TRADJENTA) 5 MG TABS tablet, Take 1 tablet (5 mg) by mouth daily (Patient taking differently: Take 5 mg by mouth every morning ), Disp: 90 tablet, Rfl: 3     losartan (COZAAR) 25 MG tablet, Take 1 tablet (25 mg) by mouth daily (Patient taking differently: Take 25 mg by mouth every morning ), Disp: 30 tablet, Rfl: 11     metoprolol succinate (TOPROL-XL) 50 MG 24 hr tablet, Take 1 tablet (50 mg) by mouth daily (Patient taking differently: Take 50 mg by mouth every morning ), Disp: 30 tablet, Rfl: 11     multivitamin, therapeutic with minerals (MULTI-VITAMIN) TABS tablet, Take 1 tablet by mouth every morning, Disp: , Rfl:      mycophenolic acid (GENERIC EQUIVALENT) 360 MG EC tablet, Take 2 tablets (720 mg) by mouth every 12 hours (Patient taking differently: Take 720 mg by mouth 2 times daily ), Disp: 120 tablet, Rfl: 3     omeprazole (PRILOSEC) 20 MG CR capsule, Take 1 capsule (20 mg) by mouth 2 times daily, Disp: 60 capsule, Rfl: 11     patiromer (VELTASSA) 8.4 g packet, Take 8.4 g by mouth daily, Disp: 30 each, Rfl: 11     predniSONE (DELTASONE) 2.5 MG tablet, Take 1 tablet (2.5 mg) by mouth daily (Patient taking differently: Take 2.5 mg by mouth every morning ), Disp: 90 tablet, Rfl: 3     tacrolimus (GENERIC EQUIVALENT) 1 MG capsule, Take 4 capsules (4 mg) by mouth every 12 hours, Disp: 240 capsule, Rfl: 11      Allergies:   Erythromycin and Vioxx      Past Medical History:  Depressive disorder  Diabetes type II, controlled  Esophageal reflux  Fibromyalgia  Gangrene of finger  H/O deep venous thrombosis  H/O CASTAÑEDA  H/O pneumonia, organism unspecified  H/O: hypertension  History of CVA  History of hepatocellular carcinoma  History of live rtransplant  History of RONNIE  Hyperlipidemia  Ischemia of both lower extemities  Liver transplant rejection  Neutropenic colitis  Osteoarthritis  Presence of  PERMANENT IVC filter  RA  Carpal tunnel syndrome  Testicular hypofunction  Sicca syndrome  Medication Refill- do not delete  Hyperkalemia  Liver transplant recipient  Immunosuppressed status  Pancytopenia  Elevated serum creatinine  History of creation of ostomy  Peristomal skin complication  Painful periwound skin  Encounter for attention to ileostomy  Ileostomy in place  Abscess, intra-abdominal, post-operative     Past Surgical History:  Bench liver  C total knee arthroplasty, right  Cholecystectomy  Colonoscopy  Endoscopic retrograde cholangiopancreatogram  ENT surgery, repair of deviated septum  EGD, combined x2  Incision and drainage abdomen washout, combined, right  Laparotomy exploratory x3  Orthopedic surgery, repair of dislocated wrist, right  Release trigger finger, right  Takedown ileostomy  Tracheostomy  Transplant liver recipient decreased donor    Family History:   Diabetes- Father  Hypertension- Father  Substance abuse- Father  Arthritis- Mother  Cancer- Mother (thyroid), Maternal Grandmother (cervical), Paternal Grandfather (prostate)  Cerebrovascular disease- Maternal Grandfather      Social History:   The patient was alone.  Smoking Status: Former Smoker, 0.25 PPD for 2 years, cigarettes, Quit: 1985   Smokeless Tobacco: Former user, chew, Quit: 10/8/2015   Alcohol Use: No alcohol since liver transplant   Employment status: Unemployed     Physical Exam:   /89  Pulse 69  Resp 16   Constitutional: Alert. In no distress.  Head: Normocephalic. No masses, lesions, tenderness or abnormalities.  ENT: No neck nodes or sinus tenderness.  Cardiovascular: RRR. No murmurs, clicks, gallops, or rub.  Respiratory: Clear to auscultation bilaterally, no wheezes or crackles.  Gastrointestinal: Abdomen soft. Non-tender. BS normal. No masses or organomegaly.  Musculoskeletal: Right leg knee is not red, warm, or swollen, tender over distal knee cap and patellar tendon, Joint mikey not tender, tender over MCL,  interior knee pain is worse with resisted knee extension, no limping  Skin: No suspicious lesions. No rashes.  Neurologic: Gait normal. Reflexes normal and symmetric. Sensation grossly WNL.  Psychiatric: Mentation appears normal. Normal affect.   Hematologic/Lymphatic/Immunologic: Normal cervical lymph nodes.      Assessment and Plan:  There are no diagnoses linked to this encounter.  Camacho is unsure if he injured his right knee by stepping in a hole. He did have an a total right knee arthroscopy and cortisone shots in the past that he describes as a different pain compared to what he feels in clinic today. He might have several issues. I suspect it could be patellar tendonitis or a light MCL sprain. We will have him see sports medicine. He will likely need to use ice and physical therapy.     Follow-up: PRN         Scribe Disclosure:   I, Timbo Moncada, am serving as a scribe to document services personally performed by Bin Starr MD at this visit, based upon the provider's statements to me. All documentation has been reviewed by the aforementioned provider prior to being entered into the official medical record.     Portions of this medical record were completed by a scribe. UPON MY REVIEW AND AUTHENTICATION BY ELECTRONIC SIGNATURE, this confirms (a) I performed the applicable clinical services, and (b) the record is accurate.   Bin Starr MD

## 2018-07-25 ENCOUNTER — RADIANT APPOINTMENT (OUTPATIENT)
Dept: GENERAL RADIOLOGY | Facility: CLINIC | Age: 53
End: 2018-07-25
Attending: FAMILY MEDICINE
Payer: COMMERCIAL

## 2018-07-25 ENCOUNTER — OFFICE VISIT (OUTPATIENT)
Dept: FAMILY MEDICINE | Facility: CLINIC | Age: 53
End: 2018-07-25
Payer: COMMERCIAL

## 2018-07-25 ENCOUNTER — OFFICE VISIT (OUTPATIENT)
Dept: ORTHOPEDICS | Facility: CLINIC | Age: 53
End: 2018-07-25
Payer: COMMERCIAL

## 2018-07-25 VITALS — RESPIRATION RATE: 16 BRPM | HEART RATE: 69 BPM | DIASTOLIC BLOOD PRESSURE: 89 MMHG | SYSTOLIC BLOOD PRESSURE: 164 MMHG

## 2018-07-25 VITALS
WEIGHT: 223 LBS | SYSTOLIC BLOOD PRESSURE: 164 MMHG | BODY MASS INDEX: 30.2 KG/M2 | DIASTOLIC BLOOD PRESSURE: 89 MMHG | HEIGHT: 72 IN

## 2018-07-25 DIAGNOSIS — M25.561 ACUTE PAIN OF RIGHT KNEE: Primary | ICD-10-CM

## 2018-07-25 DIAGNOSIS — M25.561 ACUTE PAIN OF RIGHT KNEE: ICD-10-CM

## 2018-07-25 ASSESSMENT — PAIN SCALES - GENERAL: PAINLEVEL: EXTREME PAIN (9)

## 2018-07-25 NOTE — MR AVS SNAPSHOT
After Visit Summary   7/25/2018    Camacho Bhagat    MRN: [id number]           Patient Information     Date Of Birth          1965        Visit Information        Provider Department      7/25/2018 2:20 PM Esequiel Stinson MD [facility]        Today's Diagnoses     Acute pain of right knee    -  1      Care Instructions    Use diclofenac gel up to four times daily  Follow up with physical therapy  Use brace as needed for comfort  Follow up in 6 weeks if not better, sooner if worsening          Follow-ups after your visit        Additional Services     [facility] PT, HAND, AND CHIROPRACTIC REFERRAL       Physical Therapy Referral                  Your next 10 appointments already scheduled     Aug 20, 2018  9:30 AM CDT   (Arrive by 9:15 AM)   RETURN ENDOCRINE with Alisa West MD   [facility] Endocrinology ([facility])    [address] [place] [address]  3rd [facility] [id number] [phone]            Aug 22, 2018 12:00 PM CDT   (Arrive by 11:30 AM)   Return Visit with Cinda Cazares NP   [facility] Nephrology ([facility])    [address] [place] [address]  [address]  [facility] [id number] [phone]            Sep 05, 2018 12:30 PM CDT   (Arrive by 12:15 PM)   Return Liver Transplant with Toñito Camejo MD   [facility] Hepatology ([facility])    [address] [place] [address]  [address]  [facility] [id number] [phone]            Sep 17, 2018 10:30 AM CDT   (Arrive by 10:15 AM)   PHYSICAL with Shay Kirkpatrick MD   [facility] Primary Care Clinic ([facility])    [address] [place] [address]  4th Floor  [facility] [id number] [phone]              Who to contact     Please call your clinic at [phone] to:    Ask questions about your health    Make or cancel appointments    Discuss your medicines    Learn about your test results    Speak to your doctor             Additional Information About Your Visit        [employer] Information     [employer] gives you secure access to your electronic health record. If you see a primary care provider, you can also send messages to your care team and make appointments. If you have questions, please call your primary care clinic.  If you do not have a primary care provider, please call [phone] and they will assist you.      [employer] is an electronic gateway that provides easy, online access to your medical records. With [employer], you can request a clinic appointment, read your test results, renew a prescription or communicate with your care team.     To access your existing account, please contact your [facility] or call [phone] for assistance.        Care EveryWhere ID     This is your Care EveryWhere ID. This could be used by other organizations to access your [facility] medical records  [id number]        Your Vitals Were     Height BMI (Body Mass Index)                6' (1.829 m) 30.24 kg/m2           Blood Pressure from Last 3 Encounters:   07/25/18 164/89   07/25/18 164/89   07/06/18 (!) 169/91    Weight from Last 3 Encounters:   07/25/18 223 lb (101.2 kg)   07/06/18 223 lb (101.2 kg)   06/06/18 221 lb 11.2 oz (100.6 kg)              We Performed the Following     [facility] PT, HAND, AND CHIROPRACTIC REFERRAL          Today's Medication Changes          These changes are accurate as of 7/25/18  3:52 PM.  If you have any questions, ask your nurse or doctor.               Start taking these medicines.        Dose/Directions    diclofenac 1 % Gel topical gel   Commonly known as:  VOLTAREN   Used for:  Acute pain of right knee   Started by:  Esequiel Stinson MD        Apply 4 grams to knees daily using enclosed dosing card.   Quantity:  100 g   Refills:  1         These medicines have changed or have updated prescriptions.        Dose/Directions    amLODIPine 10 MG tablet   Commonly known as:   NORVASC   This may have changed:  when to take this   Used for:  HTN (hypertension)        Dose:  10 mg   Take 1 tablet (10 mg) by mouth daily   Quantity:  90 tablet   Refills:  3       cholecalciferol 1000 UNIT tablet   Commonly known as:  vitamin D3   This may have changed:  when to take this   Used for:  Vitamin D deficiency        Dose:  1000 Units   Take 1 tablet (1,000 Units) by mouth daily   Quantity:  100 tablet   Refills:  3       cyclobenzaprine 10 MG tablet   Commonly known as:  FLEXERIL   This may have changed:  when to take this   Used for:  Immunosuppression (H)        Dose:  10 mg   Take 1 tablet (10 mg) by mouth 3 times daily as needed for muscle spasms   Quantity:  90 tablet   Refills:  0       linagliptin 5 MG Tabs tablet   Commonly known as:  TRADJENTA   This may have changed:  when to take this   Used for:  Type 2 diabetes mellitus with diabetic polyneuropathy, with long-term current use of insulin (H)        Dose:  5 mg   Take 1 tablet (5 mg) by mouth daily   Quantity:  90 tablet   Refills:  3       losartan 25 MG tablet   Commonly known as:  COZAAR   This may have changed:  when to take this   Used for:  Persistent proteinuria, Hyperkalemia, Benign essential hypertension        Dose:  25 mg   Take 1 tablet (25 mg) by mouth daily   Quantity:  30 tablet   Refills:  11       metoprolol succinate 50 MG 24 hr tablet   Commonly known as:  TOPROL-XL   This may have changed:  when to take this   Used for:  Benign essential hypertension, Persistent proteinuria, Hyperkalemia        Dose:  50 mg   Take 1 tablet (50 mg) by mouth daily   Quantity:  30 tablet   Refills:  11       mycophenolic acid 360 MG EC tablet   Commonly known as:  GENERIC EQUIVALENT   This may have changed:  when to take this   Used for:  History of liver transplant (H), Long-term use of immunosuppressant medication        Dose:  720 mg   Take 2 tablets (720 mg) by mouth every 12 hours   Quantity:  120 tablet   Refills:  3        predniSONE 2.5 MG tablet   Commonly known as:  DELTASONE   This may have changed:  when to take this   Used for:  Liver replaced by transplant (H), Long-term use of immunosuppressant medication        Dose:  2.5 mg   Take 1 tablet (2.5 mg) by mouth daily   Quantity:  90 tablet   Refills:  3            Where to get your medicines      These medications were sent to [facility] [id number] [place] [address] AT [address]  [address] [facility] [address]     Phone:  [phone]     diclofenac 1 % Gel topical gel                Primary Care Provider Office Phone # Fax #    Shay Kirkpatrick [phone]       [address] [place] [address] [address] [place] [address]        Equal Access to Services     FRANKLIN PORTILLO : Todd lawrence Sojose, waaxda luqadaha, qaybta kaalmada adeegyada, devi duckworth. So [facility] [phone].    ATENCIÓN: Si habla español, tiene a zheng disposición servicios gratuitos de asistencia lingüística. Llame al [phone].    We comply with applicable federal civil rights laws and [place] laws. We do not discriminate on the basis of race, color, national origin, age, disability, sex, sexual orientation, or gender identity.            Thank you!     Thank you for choosing [facility]  for your care. Our goal is always to provide you with excellent care. Hearing back from our patients is one way we can continue to improve our services. Please take a few minutes to complete the written survey that you may receive in the mail after your visit with us. Thank you!             Your Updated Medication List - Protect others around you: Learn how to safely use, store and throw away your medicines at www.disposemymeds.org.          This list is accurate as of 7/25/18  3:52 PM.  Always use your most recent med list.                   Brand Name Dispense Instructions for use Diagnosis    amLODIPine  10 MG tablet    NORVASC    90 tablet    Take 1 tablet (10 mg) by mouth daily    HTN (hypertension)       BLOOD GLUCOSE TEST STRIPS Strp     100 each    1 strip by Lancet route 4 times daily    Type 2 diabetes mellitus with diabetic polyneuropathy, with long-term current use of insulin (H)       cholecalciferol 1000 UNIT tablet    vitamin D3    100 tablet    Take 1 tablet (1,000 Units) by mouth daily    Vitamin D deficiency       CIALIS 5 MG tablet   Generic drug:  tadalafil      Take 5 mg by mouth as needed        cyclobenzaprine 10 MG tablet    FLEXERIL    90 tablet    Take 1 tablet (10 mg) by mouth 3 times daily as needed for muscle spasms    Immunosuppression (H)       diclofenac 1 % Gel topical gel    VOLTAREN    100 g    Apply 4 grams to knees daily using enclosed dosing card.    Acute pain of right knee       furosemide 40 MG tablet    LASIX    60 tablet    Take 1 tablet (40 mg) by mouth 2 times daily    Hyperkalemia, Persistent proteinuria, Benign essential hypertension       GABAPENTIN PO      Take 300 mg by mouth 3 times daily        insulin glargine 100 UNIT/ML injection    LANTUS    18 mL    Inject 50 Units Subcutaneous every morning    Type 2 diabetes mellitus with diabetic polyneuropathy, with long-term current use of insulin (H)       insulin pen needle 32G X 4 MM    BD ENE U/F    100 each    Use 1 pen needles daily or as directed.    Type 2 diabetes mellitus with diabetic polyneuropathy, with long-term current use of insulin (H)       linagliptin 5 MG Tabs tablet    TRADJENTA    90 tablet    Take 1 tablet (5 mg) by mouth daily    Type 2 diabetes mellitus with diabetic polyneuropathy, with long-term current use of insulin (H)       losartan 25 MG tablet    COZAAR    30 tablet    Take 1 tablet (25 mg) by mouth daily    Persistent proteinuria, Hyperkalemia, Benign essential hypertension       metoprolol succinate 50 MG 24 hr tablet    TOPROL-XL    30 tablet    Take 1 tablet (50 mg) by mouth daily     Benign essential hypertension, Persistent proteinuria, Hyperkalemia       Multi-vitamin Tabs tablet      Take 1 tablet by mouth every morning        mycophenolic acid 360 MG EC tablet    GENERIC EQUIVALENT    120 tablet    Take 2 tablets (720 mg) by mouth every 12 hours    History of liver transplant (H), Long-term use of immunosuppressant medication       omeprazole 20 MG CR capsule    priLOSEC    60 capsule    Take 1 capsule (20 mg) by mouth 2 times daily    Long-term use of immunosuppressant medication, History of liver transplant (H)       patiromer 8.4 g packet    VELTASSA    30 each    Take 8.4 g by mouth daily    Hyperkalemia       predniSONE 2.5 MG tablet    DELTASONE    90 tablet    Take 1 tablet (2.5 mg) by mouth daily    Liver replaced by transplant (H), Long-term use of immunosuppressant medication       tacrolimus 1 MG capsule    GENERIC EQUIVALENT    240 capsule    Take 4 capsules (4 mg) by mouth every 12 hours    Liver transplant recipient (H)

## 2018-07-25 NOTE — PROGRESS NOTES
[facility] Note      Patient is a 52 year old male who presents to the office today for: Right knee pain.  Last week while walking dog thinks he may have stepped in hole.  Knee buckled and felt sharp pain in medial knee.  Has had persistent pain in medial knee since that time.  Denies locking catching or instability.  Has had a few episodes of painful clicking. He does have a history of arthroscopy in this knee previously.  No pain at rest.  Worse with walking.  Has not tried any OTC medications or therapies.       Past Medical History, Current Medications, and Allergies are reviewed in the electronic medical record as appropriate.     ROS: Pertinent items are noted in HPI.  Constitutional: negative for fevers, chills and malaise  Cardiovascular: negative for dyspnea, fatigue, lower extremity edema  Integument/breast: negative for rash, skin lesion(s) and skin color change  Neurological: negative for paresthesia and weakness      EXAM:/89  Ht 6' (1.829 m)  Wt 223 lb (101.2 kg)  BMI 30.24 kg/m2    Patient is alert, No acute distress, pleasant and conversational.    Gait: nonantalgic. Normal heel toe gait.    right knee:   Skin intact. No erythema or ecchymosis.  Trace  Effusion, no soft tissue swelling.    AROM: Zero to approximately 135  without restriction or reported pain.    Palpation:   + medial joint line tenderness  No medial or lateral facet joint tenderness.  No lateral joint line tenderness     Special Tests:  Negative bounce test, + forced flexion and + Nata's for pain.  No ligamentous laxity or pain with valgus or varus stress.  Negative Lachman's, Anterior Drawer and Posterior Drawer     Full Isometric quad strength, extensor mechanism in place     Neurovascularly intact in the lower extremity    Hip and Ankle with full AROM and nontender      Imaging: X-rays of the right knee are performed reviewed independently demonstrating no acute fracture or  dislocation.  Mild narrowing in the medial compartment otherwise no significant degenerative change.      Assessment: Patient is a 52 year old male with right knee pain after injury last week concerning for meniscal injury.    Recommendations:   Reviewed imaging and assessment with the patient in detail.  Discussed the possibility of meniscal injury as well as with further evaluation and/or treatment.  After discussion of risks and benefits the patient would like to pursue course of physical therapy.  He would also like to try topical diclofenac gel.  Lastly he was given a knee brace to use as needed for comfort.  He will follow-up in 6 weeks if he is not improving or sooner if he is having worsening or new symptoms.    Esequiel Stinson MD

## 2018-07-25 NOTE — PATIENT INSTRUCTIONS
Use diclofenac gel up to four times daily  Follow up with physical therapy  Use brace as needed for comfort  Follow up in 6 weeks if not better, sooner if worsening

## 2018-07-25 NOTE — MR AVS SNAPSHOT
After Visit Summary   7/25/2018    Camacho Bhagat    MRN: [id number]           Patient Information     Date Of Birth          1965        Visit Information        Provider Department      7/25/2018 1:50 PM Bin Starr MD [facility]         Follow-ups after your visit        Your next 10 appointments already scheduled     Jul 25, 2018  1:50 PM CDT   (Arrive by 1:35 PM)   Return Visit with Bin Starr MD   [facility] ([facility])    [address] [place] [address]  4th [facility] [id number] [phone]            Aug 20, 2018  9:30 AM CDT   (Arrive by 9:15 AM)   RETURN ENDOCRINE with Alisa West MD   [facility] Endocrinology ([facility])    [address] [place] [address]  3rd Floor  [facility] [id number] [phone]            Aug 22, 2018 12:00 PM CDT   (Arrive by 11:30 AM)   Return Visit with Cinda Cazares NP   [facility] Nephrology ([facility])    [address] [place] [address]  [address]  [facility] [id number] [phone]            Sep 05, 2018 12:30 PM CDT   (Arrive by 12:15 PM)   Return Liver Transplant with Toñito Camejo MD   [facility] Hepatology ([facility])    [address] [place] [address]  [address]  [facility] [id number] [phone]            Sep 17, 2018 10:30 AM CDT   (Arrive by 10:15 AM)   PHYSICAL with Shay Kirkpatrick MD   [facility] ([facility])    [address] [place] [address]  4th [facility] [id number] [phone]              Who to contact     Please call your clinic at [phone] to:    Ask questions about your health    Make or cancel appointments    Discuss your medicines    Learn about your test results    Speak to your doctor            Additional Information About Your Visit        MyChart Information     MyChart gives you secure access to your electronic health  record. If you see a primary care provider, you can also send messages to your care team and make appointments. If you have questions, please call your primary care clinic.  If you do not have a primary care provider, please call [phone] and they will assist you.      [employer] is an electronic gateway that provides easy, online access to your medical records. With [employer], you can request a clinic appointment, read your test results, renew a prescription or communicate with your care team.     To access your existing account, please contact your [facility] or call [phone] for assistance.        Care EveryWhere ID     This is your Care EveryWhere ID. This could be used by other organizations to access your [employer] [place] medical records  [id number]        Your Vitals Were     Pulse Respirations                69 16           Blood Pressure from Last 3 Encounters:   07/25/18 164/89   07/06/18 (!) 169/91   06/06/18 161/83    Weight from Last 3 Encounters:   07/06/18 101.2 kg (223 lb)   06/06/18 100.6 kg (221 lb 11.2 oz)   05/22/18 98.1 kg (216 lb 4.3 oz)              Today, you had the following     No orders found for display         Today's Medication Changes          These changes are accurate as of 7/25/18  1:43 PM.  If you have any questions, ask your nurse or doctor.               These medicines have changed or have updated prescriptions.        Dose/Directions    amLODIPine 10 MG tablet   Commonly known as:  NORVASC   This may have changed:  when to take this   Used for:  HTN (hypertension)        Dose:  10 mg   Take 1 tablet (10 mg) by mouth daily   Quantity:  90 tablet   Refills:  3       cholecalciferol 1000 UNIT tablet   Commonly known as:  vitamin D3   This may have changed:  when to take this   Used for:  Vitamin D deficiency        Dose:  1000 Units   Take 1 tablet (1,000 Units) by mouth daily   Quantity:  100 tablet   Refills:  3       cyclobenzaprine 10 MG tablet    Commonly known as:  FLEXERIL   This may have changed:  when to take this   Used for:  Immunosuppression (H)        Dose:  10 mg   Take 1 tablet (10 mg) by mouth 3 times daily as needed for muscle spasms   Quantity:  90 tablet   Refills:  0       linagliptin 5 MG Tabs tablet   Commonly known as:  TRADJENTA   This may have changed:  when to take this   Used for:  Type 2 diabetes mellitus with diabetic polyneuropathy, with long-term current use of insulin (H)        Dose:  5 mg   Take 1 tablet (5 mg) by mouth daily   Quantity:  90 tablet   Refills:  3       losartan 25 MG tablet   Commonly known as:  COZAAR   This may have changed:  when to take this   Used for:  Persistent proteinuria, Hyperkalemia, Benign essential hypertension        Dose:  25 mg   Take 1 tablet (25 mg) by mouth daily   Quantity:  30 tablet   Refills:  11       metoprolol succinate 50 MG 24 hr tablet   Commonly known as:  TOPROL-XL   This may have changed:  when to take this   Used for:  Benign essential hypertension, Persistent proteinuria, Hyperkalemia        Dose:  50 mg   Take 1 tablet (50 mg) by mouth daily   Quantity:  30 tablet   Refills:  11       mycophenolic acid 360 MG EC tablet   Commonly known as:  GENERIC EQUIVALENT   This may have changed:  when to take this   Used for:  History of liver transplant (H), Long-term use of immunosuppressant medication        Dose:  720 mg   Take 2 tablets (720 mg) by mouth every 12 hours   Quantity:  120 tablet   Refills:  3       predniSONE 2.5 MG tablet   Commonly known as:  DELTASONE   This may have changed:  when to take this   Used for:  Liver replaced by transplant (H), Long-term use of immunosuppressant medication        Dose:  2.5 mg   Take 1 tablet (2.5 mg) by mouth daily   Quantity:  90 tablet   Refills:  3                Primary Care Provider Office Phone # Fax #    Shay Kirkpatrick [phone]       [address] [facility] [address]  [facility] [address]        Equal Access to Services      FRANKLIN PORTILLO : Hadii aad maxime melinda Carlisle, waaxda luqadaha, qaybta kaalmada magdaleno, devi sarah hayjarrod ramosnerisbev craig . So [facility] [phone].    ATENCIÓN: Si habla radha, tiene a zheng disposición servicios gratuitos de asistencia lingüística. Llame al [phone].    We comply with applicable federal civil rights laws and [place] laws. We do not discriminate on the basis of race, color, national origin, age, disability, sex, sexual orientation, or gender identity.            Thank you!     Thank you for choosing [facility]  for your care. Our goal is always to provide you with excellent care. Hearing back from our patients is one way we can continue to improve our services. Please take a few minutes to complete the written survey that you may receive in the mail after your visit with us. Thank you!             Your Updated Medication List - Protect others around you: Learn how to safely use, store and throw away your medicines at www.disposemymeds.org.          This list is accurate as of 7/25/18  1:43 PM.  Always use your most recent med list.                   Brand Name Dispense Instructions for use Diagnosis    amLODIPine 10 MG tablet    NORVASC    90 tablet    Take 1 tablet (10 mg) by mouth daily    HTN (hypertension)       BLOOD GLUCOSE TEST STRIPS Strp     100 each    1 strip by Lancet route 4 times daily    Type 2 diabetes mellitus with diabetic polyneuropathy, with long-term current use of insulin (H)       cholecalciferol 1000 UNIT tablet    vitamin D3    100 tablet    Take 1 tablet (1,000 Units) by mouth daily    Vitamin D deficiency       CIALIS 5 MG tablet   Generic drug:  tadalafil      Take 5 mg by mouth as needed        cyclobenzaprine 10 MG tablet    FLEXERIL    90 tablet    Take 1 tablet (10 mg) by mouth 3 times daily as needed for muscle spasms    Immunosuppression (H)       furosemide 40 MG tablet    LASIX    60 tablet    Take 1 tablet (40 mg) by mouth 2 times  daily    Hyperkalemia, Persistent proteinuria, Benign essential hypertension       GABAPENTIN PO      Take 300 mg by mouth 3 times daily        insulin glargine 100 UNIT/ML injection    LANTUS    18 mL    Inject 50 Units Subcutaneous every morning    Type 2 diabetes mellitus with diabetic polyneuropathy, with long-term current use of insulin (H)       insulin pen needle 32G X 4 MM    BD ENE U/F    100 each    Use 1 pen needles daily or as directed.    Type 2 diabetes mellitus with diabetic polyneuropathy, with long-term current use of insulin (H)       linagliptin 5 MG Tabs tablet    TRADJENTA    90 tablet    Take 1 tablet (5 mg) by mouth daily    Type 2 diabetes mellitus with diabetic polyneuropathy, with long-term current use of insulin (H)       losartan 25 MG tablet    COZAAR    30 tablet    Take 1 tablet (25 mg) by mouth daily    Persistent proteinuria, Hyperkalemia, Benign essential hypertension       metoprolol succinate 50 MG 24 hr tablet    TOPROL-XL    30 tablet    Take 1 tablet (50 mg) by mouth daily    Benign essential hypertension, Persistent proteinuria, Hyperkalemia       Multi-vitamin Tabs tablet      Take 1 tablet by mouth every morning        mycophenolic acid 360 MG EC tablet    GENERIC EQUIVALENT    120 tablet    Take 2 tablets (720 mg) by mouth every 12 hours    History of liver transplant (H), Long-term use of immunosuppressant medication       omeprazole 20 MG CR capsule    priLOSEC    60 capsule    Take 1 capsule (20 mg) by mouth 2 times daily    Long-term use of immunosuppressant medication, History of liver transplant (H)       patiromer 8.4 g packet    VELTASSA    30 each    Take 8.4 g by mouth daily    Hyperkalemia       predniSONE 2.5 MG tablet    DELTASONE    90 tablet    Take 1 tablet (2.5 mg) by mouth daily    Liver replaced by transplant (H), Long-term use of immunosuppressant medication       tacrolimus 1 MG capsule    GENERIC EQUIVALENT    240 capsule    Take 4 capsules (4 mg) by  mouth every 12 hours    Liver transplant recipient (H)

## 2018-07-25 NOTE — PROGRESS NOTES
SPORTS & ORTHOPEDIC WALK-IN VISIT 7/25/2018    Primary Care Physician:      Was walking dog last week, thinks he might have stepped in a gopher hole. Felt knee buckle. Pain is mostly medial, some above the knee and goes down to about 2-3 inches below knee. States it feels like a muscle pain    Reason for visit:     What part of your body is injured / painful?  right knee    What caused the injury /pain? Odd step    How long ago did your injury occur or pain begin? a week ago    What are your most bothersome symptoms? Pain    How would you characterize your symptom?  burning    What makes your symptoms better? Nothing - hasn't tried anything    What makes your symptoms worse? Walking    Have you been previously seen for this problem? Yes, PCP    Medical History:     Any recent changes to your medical history? No    Any new medication prescribed since last visit? No    Have you had surgery on this body part before? Yes Surgical Procedure: Scope    Social History:    Occupation:     Handedness: Right    Exercise: walking dogs    Review of Systems:    Do you have fever, chills, weight loss? No    Do you have any vision problems? No    Do you have any chest pain or edema? No    Do you have any shortness of breath or wheezing?  No    Do you have stomach problems? No    Do you have any numbness or focal weakness? No    Do you have diabetes? Yes, type 2    Do you have problems with bleeding or clotting? Yes, anemia     Do you have an rashes or other skin lesions? No

## 2018-07-25 NOTE — NURSING NOTE
Chief Complaint   Patient presents with     Knee Pain     pt is here to discuss right knee pain        Felicia Langley CMA at 1:31 PM on 7/25/2018

## 2018-07-27 ENCOUNTER — HOSPITAL ENCOUNTER (OUTPATIENT)
Dept: LAB | Facility: CLINIC | Age: 53
Discharge: HOME | End: 2018-07-27
Attending: INTERNAL MEDICINE | Admitting: INTERNAL MEDICINE
Payer: COMMERCIAL

## 2018-07-27 DIAGNOSIS — Z94.4 LIVER REPLACED BY TRANSPLANT (H): ICD-10-CM

## 2018-07-27 DIAGNOSIS — N18.30 CKD (CHRONIC KIDNEY DISEASE) STAGE 3, GFR 30-59 ML/MIN (H): ICD-10-CM

## 2018-07-27 DIAGNOSIS — Z94.4 LIVER TRANSPLANT RECIPIENT (H): ICD-10-CM

## 2018-07-27 LAB
ALBUMIN SERPL-MCNC: 3.5 G/DL (ref 3.4–5)
ALP SERPL-CCNC: 428 U/L (ref 40–150)
ALT SERPL W P-5'-P-CCNC: 101 U/L (ref 0–70)
ANION GAP SERPL CALCULATED.3IONS-SCNC: 6 MMOL/L (ref 3–14)
AST SERPL W P-5'-P-CCNC: 68 U/L (ref 0–45)
BILIRUB DIRECT SERPL-MCNC: 0.3 MG/DL (ref 0–0.2)
BILIRUB SERPL-MCNC: 0.6 MG/DL (ref 0.2–1.3)
BUN SERPL-MCNC: 37 MG/DL (ref 7–30)
CALCIUM SERPL-MCNC: 8.2 MG/DL (ref 8.5–10.1)
CHLORIDE SERPL-SCNC: 108 MMOL/L (ref 94–109)
CO2 SERPL-SCNC: 24 MMOL/L (ref 20–32)
CREAT SERPL-MCNC: 1.32 MG/DL (ref 0.66–1.25)
ERYTHROCYTE [DISTWIDTH] IN BLOOD BY AUTOMATED COUNT: 13.9 % (ref 10–15)
GFR SERPL CREATININE-BSD FRML MDRD: 57 ML/MIN/1.7M2
GLUCOSE SERPL-MCNC: 389 MG/DL (ref 70–99)
HCT VFR BLD AUTO: 33.4 % (ref 40–53)
HGB BLD-MCNC: 11.2 G/DL (ref 13.3–17.7)
MAGNESIUM SERPL-MCNC: 1.6 MG/DL (ref 1.6–2.3)
MCH RBC QN AUTO: 32 PG (ref 26.5–33)
MCHC RBC AUTO-ENTMCNC: 33.5 G/DL (ref 31.5–36.5)
MCV RBC AUTO: 95 FL (ref 78–100)
PHOSPHATE SERPL-MCNC: 3 MG/DL (ref 2.5–4.5)
PLATELET # BLD AUTO: 63 10E9/L (ref 150–450)
POTASSIUM SERPL-SCNC: 5.7 MMOL/L (ref 3.4–5.3)
PROT SERPL-MCNC: 6.8 G/DL (ref 6.8–8.8)
RBC # BLD AUTO: 3.5 10E12/L (ref 4.4–5.9)
SODIUM SERPL-SCNC: 138 MMOL/L (ref 133–144)
TACROLIMUS BLD-MCNC: 4.3 UG/L (ref 5–15)
TME LAST DOSE: ABNORMAL H
WBC # BLD AUTO: 3.7 10E9/L (ref 4–11)

## 2018-07-27 PROCEDURE — 84075 ASSAY ALKALINE PHOSPHATASE: CPT

## 2018-07-27 PROCEDURE — 82247 BILIRUBIN TOTAL: CPT

## 2018-07-27 PROCEDURE — 82248 BILIRUBIN DIRECT: CPT

## 2018-07-27 PROCEDURE — 80048 BASIC METABOLIC PNL TOTAL CA: CPT

## 2018-07-27 PROCEDURE — 80197 ASSAY OF TACROLIMUS: CPT | Performed by: INTERNAL MEDICINE

## 2018-07-27 PROCEDURE — 36415 COLL VENOUS BLD VENIPUNCTURE: CPT

## 2018-07-27 PROCEDURE — 84155 ASSAY OF PROTEIN SERUM: CPT

## 2018-07-27 PROCEDURE — 84460 ALANINE AMINO (ALT) (SGPT): CPT

## 2018-07-27 PROCEDURE — 83735 ASSAY OF MAGNESIUM: CPT

## 2018-07-27 PROCEDURE — 84450 TRANSFERASE (AST) (SGOT): CPT

## 2018-07-27 PROCEDURE — 80069 RENAL FUNCTION PANEL: CPT

## 2018-07-27 PROCEDURE — 85027 COMPLETE CBC AUTOMATED: CPT

## 2018-07-30 ENCOUNTER — TELEPHONE (OUTPATIENT)
Dept: TRANSPLANT | Facility: CLINIC | Age: 53
End: 2018-07-30

## 2018-07-30 DIAGNOSIS — M54.50 LOW BACK PAIN AT MULTIPLE SITES: ICD-10-CM

## 2018-07-30 RX ORDER — GABAPENTIN 300 MG/1
CAPSULE ORAL
Qty: 270 CAPSULE | Refills: 0 | Status: SHIPPED | OUTPATIENT
Start: 2018-07-30 | End: 2018-10-30

## 2018-07-30 NOTE — TELEPHONE ENCOUNTER
Patient Call: Transplant Lab/Orders  Route to LPN  Post Transplant Days: 513  When patient is less than 60 days post-transplant, route high priority    Reason for Call: Discuss lab results; which results? [place]  Callback needed? Yes    Return Call Needed  Same as documented in contacts section  When to return call?: Greater than one day: Route standard priority

## 2018-07-31 ENCOUNTER — HOSPITAL ENCOUNTER (OUTPATIENT)
Dept: LAB | Facility: CLINIC | Age: 53
Discharge: HOME | End: 2018-07-31
Attending: INTERNAL MEDICINE | Admitting: INTERNAL MEDICINE
Payer: COMMERCIAL

## 2018-07-31 ENCOUNTER — TELEPHONE (OUTPATIENT)
Dept: TRANSPLANT | Facility: CLINIC | Age: 53
End: 2018-07-31

## 2018-07-31 DIAGNOSIS — N18.30 CKD (CHRONIC KIDNEY DISEASE) STAGE 3, GFR 30-59 ML/MIN (H): ICD-10-CM

## 2018-07-31 DIAGNOSIS — Z94.4 LIVER TRANSPLANT RECIPIENT (H): ICD-10-CM

## 2018-07-31 DIAGNOSIS — Z94.4 LIVER REPLACED BY TRANSPLANT (H): ICD-10-CM

## 2018-07-31 LAB
ALBUMIN SERPL-MCNC: 3.5 G/DL (ref 3.4–5)
ALP SERPL-CCNC: 431 U/L (ref 40–150)
ALT SERPL W P-5'-P-CCNC: 97 U/L (ref 0–70)
ANION GAP SERPL CALCULATED.3IONS-SCNC: 8 MMOL/L (ref 3–14)
AST SERPL W P-5'-P-CCNC: 75 U/L (ref 0–45)
BILIRUB DIRECT SERPL-MCNC: 0.3 MG/DL (ref 0–0.2)
BILIRUB SERPL-MCNC: 0.8 MG/DL (ref 0.2–1.3)
BUN SERPL-MCNC: 33 MG/DL (ref 7–30)
CALCIUM SERPL-MCNC: 8 MG/DL (ref 8.5–10.1)
CHLORIDE SERPL-SCNC: 104 MMOL/L (ref 94–109)
CO2 SERPL-SCNC: 25 MMOL/L (ref 20–32)
CREAT SERPL-MCNC: 1.34 MG/DL (ref 0.66–1.25)
ERYTHROCYTE [DISTWIDTH] IN BLOOD BY AUTOMATED COUNT: 13.6 % (ref 10–15)
GFR SERPL CREATININE-BSD FRML MDRD: 56 ML/MIN/1.7M2
GLUCOSE SERPL-MCNC: 252 MG/DL (ref 70–99)
HCT VFR BLD AUTO: 33.4 % (ref 40–53)
HGB BLD-MCNC: 11.2 G/DL (ref 13.3–17.7)
MAGNESIUM SERPL-MCNC: 1.6 MG/DL (ref 1.6–2.3)
MCH RBC QN AUTO: 31.5 PG (ref 26.5–33)
MCHC RBC AUTO-ENTMCNC: 33.5 G/DL (ref 31.5–36.5)
MCV RBC AUTO: 94 FL (ref 78–100)
PHOSPHATE SERPL-MCNC: 2.8 MG/DL (ref 2.5–4.5)
PLATELET # BLD AUTO: 61 10E9/L (ref 150–450)
POTASSIUM SERPL-SCNC: 4.4 MMOL/L (ref 3.4–5.3)
PROT SERPL-MCNC: 6.5 G/DL (ref 6.8–8.8)
RBC # BLD AUTO: 3.56 10E12/L (ref 4.4–5.9)
SODIUM SERPL-SCNC: 137 MMOL/L (ref 133–144)
TACROLIMUS BLD-MCNC: 6.2 UG/L (ref 5–15)
TME LAST DOSE: NORMAL H
WBC # BLD AUTO: 4.1 10E9/L (ref 4–11)

## 2018-07-31 PROCEDURE — 84450 TRANSFERASE (AST) (SGOT): CPT

## 2018-07-31 PROCEDURE — 84155 ASSAY OF PROTEIN SERUM: CPT

## 2018-07-31 PROCEDURE — 84460 ALANINE AMINO (ALT) (SGPT): CPT

## 2018-07-31 PROCEDURE — 82248 BILIRUBIN DIRECT: CPT

## 2018-07-31 PROCEDURE — 82247 BILIRUBIN TOTAL: CPT

## 2018-07-31 PROCEDURE — 80197 ASSAY OF TACROLIMUS: CPT | Performed by: INTERNAL MEDICINE

## 2018-07-31 PROCEDURE — 84100 ASSAY OF PHOSPHORUS: CPT

## 2018-07-31 PROCEDURE — 36415 COLL VENOUS BLD VENIPUNCTURE: CPT

## 2018-07-31 PROCEDURE — 80069 RENAL FUNCTION PANEL: CPT

## 2018-07-31 PROCEDURE — 85027 COMPLETE CBC AUTOMATED: CPT

## 2018-07-31 PROCEDURE — 84075 ASSAY ALKALINE PHOSPHATASE: CPT

## 2018-07-31 PROCEDURE — 83735 ASSAY OF MAGNESIUM: CPT

## 2018-07-31 RX ORDER — TACROLIMUS 1 MG/1
5 CAPSULE ORAL EVERY 12 HOURS
Qty: 300 CAPSULE | Refills: 11 | Status: SHIPPED | OUTPATIENT
Start: 2018-07-31 | End: 2018-08-15

## 2018-07-31 NOTE — TELEPHONE ENCOUNTER
Spoke with Camacho, potassium better, plan to increase tacrolimus dose to 5mg Q 12 hours.  Labs repeated next week.

## 2018-08-03 ENCOUNTER — HOSPITAL ENCOUNTER (OUTPATIENT)
Dept: LAB | Facility: CLINIC | Age: 53
Discharge: HOME | End: 2018-08-03
Attending: INTERNAL MEDICINE
Payer: COMMERCIAL

## 2018-08-03 DIAGNOSIS — Z94.4 LIVER REPLACED BY TRANSPLANT (H): ICD-10-CM

## 2018-08-03 DIAGNOSIS — Z94.4 LIVER TRANSPLANT RECIPIENT (H): ICD-10-CM

## 2018-08-03 DIAGNOSIS — N18.30 CKD (CHRONIC KIDNEY DISEASE) STAGE 3, GFR 30-59 ML/MIN (H): ICD-10-CM

## 2018-08-03 LAB
ALBUMIN SERPL-MCNC: 3.4 G/DL (ref 3.4–5)
ALP SERPL-CCNC: 478 U/L (ref 40–150)
ALT SERPL W P-5'-P-CCNC: 93 U/L (ref 0–70)
ANION GAP SERPL CALCULATED.3IONS-SCNC: 4 MMOL/L (ref 3–14)
AST SERPL W P-5'-P-CCNC: 56 U/L (ref 0–45)
BILIRUB DIRECT SERPL-MCNC: 0.3 MG/DL (ref 0–0.2)
BILIRUB SERPL-MCNC: 0.7 MG/DL (ref 0.2–1.3)
BUN SERPL-MCNC: 27 MG/DL (ref 7–30)
CALCIUM SERPL-MCNC: 8.2 MG/DL (ref 8.5–10.1)
CHLORIDE SERPL-SCNC: 105 MMOL/L (ref 94–109)
CO2 SERPL-SCNC: 27 MMOL/L (ref 20–32)
CREAT SERPL-MCNC: 1.17 MG/DL (ref 0.66–1.25)
ERYTHROCYTE [DISTWIDTH] IN BLOOD BY AUTOMATED COUNT: 13.4 % (ref 10–15)
GFR SERPL CREATININE-BSD FRML MDRD: 65 ML/MIN/1.7M2
GLUCOSE SERPL-MCNC: 405 MG/DL (ref 70–99)
HCT VFR BLD AUTO: 34.2 % (ref 40–53)
HGB BLD-MCNC: 11.3 G/DL (ref 13.3–17.7)
MAGNESIUM SERPL-MCNC: 1.7 MG/DL (ref 1.6–2.3)
MCH RBC QN AUTO: 31.5 PG (ref 26.5–33)
MCHC RBC AUTO-ENTMCNC: 33 G/DL (ref 31.5–36.5)
MCV RBC AUTO: 95 FL (ref 78–100)
PHOSPHATE SERPL-MCNC: 2.7 MG/DL (ref 2.5–4.5)
PLATELET # BLD AUTO: 61 10E9/L (ref 150–450)
POTASSIUM SERPL-SCNC: 5.4 MMOL/L (ref 3.4–5.3)
PROT SERPL-MCNC: 6.8 G/DL (ref 6.8–8.8)
RBC # BLD AUTO: 3.59 10E12/L (ref 4.4–5.9)
SODIUM SERPL-SCNC: 136 MMOL/L (ref 133–144)
TACROLIMUS BLD-MCNC: 6.7 UG/L (ref 5–15)
TME LAST DOSE: NORMAL H
WBC # BLD AUTO: 3.5 10E9/L (ref 4–11)

## 2018-08-03 PROCEDURE — 85027 COMPLETE CBC AUTOMATED: CPT

## 2018-08-03 PROCEDURE — 36415 COLL VENOUS BLD VENIPUNCTURE: CPT

## 2018-08-03 PROCEDURE — 83735 ASSAY OF MAGNESIUM: CPT

## 2018-08-03 PROCEDURE — 84075 ASSAY ALKALINE PHOSPHATASE: CPT

## 2018-08-03 PROCEDURE — 36415 COLL VENOUS BLD VENIPUNCTURE: CPT | Performed by: INTERNAL MEDICINE

## 2018-08-03 PROCEDURE — 84460 ALANINE AMINO (ALT) (SGPT): CPT

## 2018-08-03 PROCEDURE — 82248 BILIRUBIN DIRECT: CPT

## 2018-08-03 PROCEDURE — 80197 ASSAY OF TACROLIMUS: CPT | Performed by: INTERNAL MEDICINE

## 2018-08-03 PROCEDURE — 82247 BILIRUBIN TOTAL: CPT

## 2018-08-03 PROCEDURE — 80069 RENAL FUNCTION PANEL: CPT

## 2018-08-03 PROCEDURE — 84450 TRANSFERASE (AST) (SGOT): CPT

## 2018-08-03 PROCEDURE — 84155 ASSAY OF PROTEIN SERUM: CPT

## 2018-08-07 ENCOUNTER — HOSPITAL ENCOUNTER (OUTPATIENT)
Dept: LAB | Facility: CLINIC | Age: 53
Discharge: HOME | End: 2018-08-07
Attending: INTERNAL MEDICINE
Payer: COMMERCIAL

## 2018-08-07 DIAGNOSIS — Z94.4 LIVER REPLACED BY TRANSPLANT (H): ICD-10-CM

## 2018-08-07 DIAGNOSIS — Z94.4 LIVER TRANSPLANT RECIPIENT (H): ICD-10-CM

## 2018-08-07 DIAGNOSIS — N18.30 CKD (CHRONIC KIDNEY DISEASE) STAGE 3, GFR 30-59 ML/MIN (H): ICD-10-CM

## 2018-08-07 LAB
ALBUMIN SERPL-MCNC: 3.6 G/DL (ref 3.4–5)
ALP SERPL-CCNC: 453 U/L (ref 40–150)
ALT SERPL W P-5'-P-CCNC: 91 U/L (ref 0–70)
ANION GAP SERPL CALCULATED.3IONS-SCNC: 5 MMOL/L (ref 3–14)
AST SERPL W P-5'-P-CCNC: 61 U/L (ref 0–45)
BILIRUB DIRECT SERPL-MCNC: 0.3 MG/DL (ref 0–0.2)
BILIRUB SERPL-MCNC: 1 MG/DL (ref 0.2–1.3)
BUN SERPL-MCNC: 38 MG/DL (ref 7–30)
CALCIUM SERPL-MCNC: 8.7 MG/DL (ref 8.5–10.1)
CHLORIDE SERPL-SCNC: 103 MMOL/L (ref 94–109)
CO2 SERPL-SCNC: 28 MMOL/L (ref 20–32)
CREAT SERPL-MCNC: 1.29 MG/DL (ref 0.66–1.25)
ERYTHROCYTE [DISTWIDTH] IN BLOOD BY AUTOMATED COUNT: 13.5 % (ref 10–15)
GFR SERPL CREATININE-BSD FRML MDRD: 58 ML/MIN/1.7M2
GLUCOSE SERPL-MCNC: 342 MG/DL (ref 70–99)
HCT VFR BLD AUTO: 36.3 % (ref 40–53)
HGB BLD-MCNC: 12.2 G/DL (ref 13.3–17.7)
MAGNESIUM SERPL-MCNC: 1.6 MG/DL (ref 1.6–2.3)
MCH RBC QN AUTO: 31.4 PG (ref 26.5–33)
MCHC RBC AUTO-ENTMCNC: 33.6 G/DL (ref 31.5–36.5)
MCV RBC AUTO: 93 FL (ref 78–100)
PHOSPHATE SERPL-MCNC: 3.5 MG/DL (ref 2.5–4.5)
PLATELET # BLD AUTO: 73 10E9/L (ref 150–450)
POTASSIUM SERPL-SCNC: 4.6 MMOL/L (ref 3.4–5.3)
PROT SERPL-MCNC: 7 G/DL (ref 6.8–8.8)
RBC # BLD AUTO: 3.89 10E12/L (ref 4.4–5.9)
SODIUM SERPL-SCNC: 136 MMOL/L (ref 133–144)
WBC # BLD AUTO: 4.2 10E9/L (ref 4–11)

## 2018-08-07 PROCEDURE — 84450 TRANSFERASE (AST) (SGOT): CPT

## 2018-08-07 PROCEDURE — 84460 ALANINE AMINO (ALT) (SGPT): CPT

## 2018-08-07 PROCEDURE — 83735 ASSAY OF MAGNESIUM: CPT

## 2018-08-07 PROCEDURE — 82247 BILIRUBIN TOTAL: CPT

## 2018-08-07 PROCEDURE — 36415 COLL VENOUS BLD VENIPUNCTURE: CPT

## 2018-08-07 PROCEDURE — 82248 BILIRUBIN DIRECT: CPT

## 2018-08-07 PROCEDURE — 84155 ASSAY OF PROTEIN SERUM: CPT

## 2018-08-07 PROCEDURE — 84100 ASSAY OF PHOSPHORUS: CPT

## 2018-08-07 PROCEDURE — 84075 ASSAY ALKALINE PHOSPHATASE: CPT

## 2018-08-07 PROCEDURE — 80197 ASSAY OF TACROLIMUS: CPT | Performed by: INTERNAL MEDICINE

## 2018-08-07 PROCEDURE — 85027 COMPLETE CBC AUTOMATED: CPT

## 2018-08-07 PROCEDURE — 80069 RENAL FUNCTION PANEL: CPT

## 2018-08-08 LAB
TACROLIMUS BLD-MCNC: 6.3 UG/L (ref 5–15)
TME LAST DOSE: NORMAL H

## 2018-08-10 ENCOUNTER — TELEPHONE (OUTPATIENT)
Dept: TRANSPLANT | Facility: CLINIC | Age: 53
End: 2018-08-10

## 2018-08-10 ENCOUNTER — HOSPITAL ENCOUNTER (OUTPATIENT)
Dept: LAB | Facility: CLINIC | Age: 53
Discharge: HOME | End: 2018-08-10
Attending: INTERNAL MEDICINE
Payer: COMMERCIAL

## 2018-08-10 DIAGNOSIS — N18.30 CKD (CHRONIC KIDNEY DISEASE) STAGE 3, GFR 30-59 ML/MIN (H): ICD-10-CM

## 2018-08-10 DIAGNOSIS — Z94.4 LIVER REPLACED BY TRANSPLANT (H): ICD-10-CM

## 2018-08-10 DIAGNOSIS — Z94.4 LIVER TRANSPLANT RECIPIENT (H): ICD-10-CM

## 2018-08-10 LAB
ALBUMIN SERPL-MCNC: 3.6 G/DL (ref 3.4–5)
ALP SERPL-CCNC: 484 U/L (ref 40–150)
ALT SERPL W P-5'-P-CCNC: 102 U/L (ref 0–70)
ANION GAP SERPL CALCULATED.3IONS-SCNC: 4 MMOL/L (ref 3–14)
AST SERPL W P-5'-P-CCNC: 74 U/L (ref 0–45)
BILIRUB DIRECT SERPL-MCNC: 0.4 MG/DL (ref 0–0.2)
BILIRUB SERPL-MCNC: 0.9 MG/DL (ref 0.2–1.3)
BUN SERPL-MCNC: 38 MG/DL (ref 7–30)
CALCIUM SERPL-MCNC: 8.1 MG/DL (ref 8.5–10.1)
CHLORIDE SERPL-SCNC: 105 MMOL/L (ref 94–109)
CO2 SERPL-SCNC: 26 MMOL/L (ref 20–32)
CREAT SERPL-MCNC: 1.35 MG/DL (ref 0.66–1.25)
ERYTHROCYTE [DISTWIDTH] IN BLOOD BY AUTOMATED COUNT: 13.7 % (ref 10–15)
GFR SERPL CREATININE-BSD FRML MDRD: 55 ML/MIN/1.7M2
GLUCOSE SERPL-MCNC: 330 MG/DL (ref 70–99)
HCT VFR BLD AUTO: 33.9 % (ref 40–53)
HGB BLD-MCNC: 11.5 G/DL (ref 13.3–17.7)
MAGNESIUM SERPL-MCNC: 2 MG/DL (ref 1.6–2.3)
MCH RBC QN AUTO: 31.5 PG (ref 26.5–33)
MCHC RBC AUTO-ENTMCNC: 33.9 G/DL (ref 31.5–36.5)
MCV RBC AUTO: 93 FL (ref 78–100)
PHOSPHATE SERPL-MCNC: 2.8 MG/DL (ref 2.5–4.5)
PLATELET # BLD AUTO: 67 10E9/L (ref 150–450)
POTASSIUM SERPL-SCNC: 5.9 MMOL/L (ref 3.4–5.3)
PROT SERPL-MCNC: 6.8 G/DL (ref 6.8–8.8)
RBC # BLD AUTO: 3.65 10E12/L (ref 4.4–5.9)
SODIUM SERPL-SCNC: 135 MMOL/L (ref 133–144)
TACROLIMUS BLD-MCNC: 6.2 UG/L (ref 5–15)
TME LAST DOSE: NORMAL H
WBC # BLD AUTO: 4.1 10E9/L (ref 4–11)

## 2018-08-10 PROCEDURE — 82247 BILIRUBIN TOTAL: CPT

## 2018-08-10 PROCEDURE — 84460 ALANINE AMINO (ALT) (SGPT): CPT

## 2018-08-10 PROCEDURE — 80197 ASSAY OF TACROLIMUS: CPT | Performed by: INTERNAL MEDICINE

## 2018-08-10 PROCEDURE — 80069 RENAL FUNCTION PANEL: CPT

## 2018-08-10 PROCEDURE — 84450 TRANSFERASE (AST) (SGOT): CPT

## 2018-08-10 PROCEDURE — 82248 BILIRUBIN DIRECT: CPT

## 2018-08-10 PROCEDURE — 84155 ASSAY OF PROTEIN SERUM: CPT

## 2018-08-10 PROCEDURE — 84075 ASSAY ALKALINE PHOSPHATASE: CPT

## 2018-08-10 PROCEDURE — 36415 COLL VENOUS BLD VENIPUNCTURE: CPT

## 2018-08-10 PROCEDURE — 85027 COMPLETE CBC AUTOMATED: CPT

## 2018-08-10 PROCEDURE — 36415 COLL VENOUS BLD VENIPUNCTURE: CPT | Performed by: INTERNAL MEDICINE

## 2018-08-10 PROCEDURE — 83735 ASSAY OF MAGNESIUM: CPT

## 2018-08-10 NOTE — LETTER
OUTPATIENT OR TRANSITIONAL CARE  LABORATORY TEST ORDER    Patient Name: Camacho Bhagat  Transplant Date: 3/4/2017   YOB: 1965  Issue Date: 08/10/18   [facility] MR#: [id number]  Expiration Date: 08/10/19       Diagnoses: [x]      Liver Transplant (ICD-10 Z94.4)    [x]      Long term use of medications (ICD-10 Z79.899)     Please fax results to [phone]    Frequency: 2x a week, and PRN        [x] CBC with Platelets   [x] Basic Metabolic Panel (Sodium, Potassium, Chloride, CO2, Creatinine, Urea Nitrogen, Glucose, Calcium)  [x] Hepatic panel (Albumin, Alk Phos, ALT, AST, Direct and Total Bili)  [x] Tacrolimus drug level - 12 hour trough, please document time of last dose       If you have questions, please call [phone] or [phone].    .

## 2018-08-10 NOTE — TELEPHONE ENCOUNTER
Provider Call: Transplant Lab/Orders  Route to LPN  Post Transplant Days: 524  When patient is less than 60 days post-transplant, route high priority  Reason for Call: Annual lab reorder  Liver patients reporting abnormal lab results: Route to RN and Page  Document lab facility information when provider is calling about annual lab orders. Delete facility wildcards when not needed.  Facility Name: [facility]  Facility Location: [facility]    Callback needed? Yes    Return Call Needed  Same as documented in contacts section  When to return call?: Greater than one day: Route standard priority

## 2018-08-14 ENCOUNTER — TELEPHONE (OUTPATIENT)
Dept: TRANSPLANT | Facility: CLINIC | Age: 53
End: 2018-08-14

## 2018-08-14 ENCOUNTER — HOSPITAL ENCOUNTER (OUTPATIENT)
Dept: LAB | Facility: CLINIC | Age: 53
Discharge: HOME | End: 2018-08-14
Attending: INTERNAL MEDICINE
Payer: COMMERCIAL

## 2018-08-14 DIAGNOSIS — Z94.4 LIVER REPLACED BY TRANSPLANT (H): Primary | ICD-10-CM

## 2018-08-14 DIAGNOSIS — Z94.4 LIVER TRANSPLANT RECIPIENT (H): ICD-10-CM

## 2018-08-14 DIAGNOSIS — N18.30 CKD (CHRONIC KIDNEY DISEASE) STAGE 3, GFR 30-59 ML/MIN (H): ICD-10-CM

## 2018-08-14 DIAGNOSIS — E87.5 HYPERKALEMIA: Primary | ICD-10-CM

## 2018-08-14 DIAGNOSIS — Z94.4 LIVER REPLACED BY TRANSPLANT (H): ICD-10-CM

## 2018-08-14 LAB
ALBUMIN SERPL-MCNC: 3.6 G/DL (ref 3.4–5)
ALP SERPL-CCNC: 401 U/L (ref 40–150)
ALT SERPL W P-5'-P-CCNC: 85 U/L (ref 0–70)
ANION GAP SERPL CALCULATED.3IONS-SCNC: 6 MMOL/L (ref 3–14)
ANION GAP SERPL CALCULATED.3IONS-SCNC: NORMAL MMOL/L (ref 6–17)
AST SERPL W P-5'-P-CCNC: 68 U/L (ref 0–45)
BILIRUB DIRECT SERPL-MCNC: 0.4 MG/DL (ref 0–0.2)
BILIRUB SERPL-MCNC: 1 MG/DL (ref 0.2–1.3)
BUN SERPL-MCNC: 38 MG/DL (ref 7–30)
BUN SERPL-MCNC: NORMAL MG/DL (ref 7–30)
CALCIUM SERPL-MCNC: 8.2 MG/DL (ref 8.5–10.1)
CALCIUM SERPL-MCNC: NORMAL MG/DL (ref 8.5–10.1)
CHLORIDE SERPL-SCNC: 105 MMOL/L (ref 94–109)
CHLORIDE SERPL-SCNC: NORMAL MMOL/L (ref 94–109)
CO2 SERPL-SCNC: 24 MMOL/L (ref 20–32)
CO2 SERPL-SCNC: NORMAL MMOL/L (ref 20–32)
CREAT SERPL-MCNC: 1.52 MG/DL (ref 0.66–1.25)
CREAT SERPL-MCNC: NORMAL MG/DL (ref 0.66–1.25)
ERYTHROCYTE [DISTWIDTH] IN BLOOD BY AUTOMATED COUNT: 13.7 % (ref 10–15)
GFR SERPL CREATININE-BSD FRML MDRD: 48 ML/MIN/1.7M2
GFR SERPL CREATININE-BSD FRML MDRD: NORMAL ML/MIN/1.7M2
GLUCOSE SERPL-MCNC: 191 MG/DL (ref 70–99)
GLUCOSE SERPL-MCNC: NORMAL MG/DL (ref 70–99)
HCT VFR BLD AUTO: 32 % (ref 40–53)
HGB BLD-MCNC: 10.7 G/DL (ref 13.3–17.7)
MCH RBC QN AUTO: 31.4 PG (ref 26.5–33)
MCHC RBC AUTO-ENTMCNC: 33.4 G/DL (ref 31.5–36.5)
MCV RBC AUTO: 94 FL (ref 78–100)
PHOSPHATE SERPL-MCNC: 3.5 MG/DL (ref 2.5–4.5)
PLATELET # BLD AUTO: 71 10E9/L (ref 150–450)
POTASSIUM SERPL-SCNC: 5 MMOL/L (ref 3.4–5.3)
POTASSIUM SERPL-SCNC: NORMAL MMOL/L (ref 3.4–5.3)
PROT SERPL-MCNC: 6.8 G/DL (ref 6.8–8.8)
RBC # BLD AUTO: 3.41 10E12/L (ref 4.4–5.9)
SODIUM SERPL-SCNC: 135 MMOL/L (ref 133–144)
SODIUM SERPL-SCNC: NORMAL MMOL/L (ref 133–144)
TACROLIMUS BLD-MCNC: 6.8 UG/L (ref 5–15)
TME LAST DOSE: NORMAL H
WBC # BLD AUTO: 3.8 10E9/L (ref 4–11)

## 2018-08-14 PROCEDURE — 84075 ASSAY ALKALINE PHOSPHATASE: CPT

## 2018-08-14 PROCEDURE — 85027 COMPLETE CBC AUTOMATED: CPT | Performed by: INTERNAL MEDICINE

## 2018-08-14 PROCEDURE — 82247 BILIRUBIN TOTAL: CPT

## 2018-08-14 PROCEDURE — 36415 COLL VENOUS BLD VENIPUNCTURE: CPT | Performed by: INTERNAL MEDICINE

## 2018-08-14 PROCEDURE — 84450 TRANSFERASE (AST) (SGOT): CPT

## 2018-08-14 PROCEDURE — 84155 ASSAY OF PROTEIN SERUM: CPT

## 2018-08-14 PROCEDURE — 80069 RENAL FUNCTION PANEL: CPT

## 2018-08-14 PROCEDURE — 84460 ALANINE AMINO (ALT) (SGPT): CPT

## 2018-08-14 PROCEDURE — 80197 ASSAY OF TACROLIMUS: CPT | Performed by: INTERNAL MEDICINE

## 2018-08-14 PROCEDURE — 82248 BILIRUBIN DIRECT: CPT

## 2018-08-15 ENCOUNTER — TELEPHONE (OUTPATIENT)
Dept: TRANSPLANT | Facility: CLINIC | Age: 53
End: 2018-08-15

## 2018-08-15 DIAGNOSIS — R79.89 ABNORMAL LFTS: ICD-10-CM

## 2018-08-15 DIAGNOSIS — Z94.4 HISTORY OF LIVER TRANSPLANT (H): Primary | ICD-10-CM

## 2018-08-15 DIAGNOSIS — Z79.60 LONG-TERM USE OF IMMUNOSUPPRESSANT MEDICATION: ICD-10-CM

## 2018-08-15 RX ORDER — TACROLIMUS 1 MG/1
CAPSULE ORAL
Qty: 330 CAPSULE | Refills: 11 | Status: SHIPPED | OUTPATIENT
Start: 2018-08-15 | End: 2019-03-08

## 2018-08-15 NOTE — TELEPHONE ENCOUNTER
Camacho calling back about to discuss lab frequency.   Reviewed with him that he has been doing labs 2 x/week, and does not need to do as frequently.   Today when he went in for labs, the orders were mixed up, lab did not know what to draw.   Discussed with him that his lab orders did need to be updated, some labs were , some labs were duplicates.    Camacho states that he is doing labs 2x/week because  told him to continue doing that often.   He states that he is very worried about his liver tests,and if the rejection is gone, why are his labs not back to normal?  He further asks if he can increase his tacrolimus a little, as his potassium is better?   Plan made to continue 2x/week labs, until he sees  in Sept, and to let  know of his concerns about LFT's.   Current dose of tacrolimus is 5mg Q 12 hours, and most recent level of 6.2.   Plan made with Camacho to increase dose to 6mg in am, 5mg in pm.     Camacho verbalized understanding, and knows that he will receive a call back with any other recommendations per

## 2018-08-15 NOTE — TELEPHONE ENCOUNTER
Per request , pt to have MRCP.   Voice mail message left for pt, who returned phone call.  Reviewed plan for MRCP, he has had these done before.  Orders placed for MRCP.

## 2018-08-16 ENCOUNTER — TELEPHONE (OUTPATIENT)
Dept: ENDOCRINOLOGY | Facility: CLINIC | Age: 53
End: 2018-08-16

## 2018-08-16 ASSESSMENT — ENCOUNTER SYMPTOMS
PANIC: 0
INSOMNIA: 0
NERVOUS/ANXIOUS: 0
DEPRESSION: 1
DECREASED CONCENTRATION: 0

## 2018-08-16 NOTE — TELEPHONE ENCOUNTER
CC:    Chief Complaint   Patient presents with   • Throat Problem     Lymph nodes in the neck since December 26th, 2017.       HPI:  Suzanna is a 10 year old female seen for otolaryngology consultation at the request of Dr. Calderon. She has persistent left neck adenopathy for about 4 weeks.  She initially presented to PCP office and [facility] ER on 12/26/17 with complaint of increasing left neck mass and fevers around 101F.  During ER visit she had temperature 100F with essentially normal CBC, negative rapid strep testing, monospot negative and ultrasound that reported a cluster of oval-shaped lymph nodes in the left posterior cervical chain, largest node measures 1.5 x 0.8 cm. There is normal intranodal flow and echogenic sinus. Prominent lymph nodes are seen in left anterior cervical region, largest one measures 1.6 x 0.9 cm with increased vascularity. Prominent lymph nodes are also seen in the right neck.Submandibular glands appear normal. No fluid collection. No discrete mass lesions.  Patient discharged with diagnosis lymphadenopathy and treated with course of Augmentin and followup with PCP.  Patient continued to have fevers at 101F to 103F at home and was seen again at [facility] on 12/30/17.  She did complain of some neck tenderness when turning her head but never any overlying skin redness.  At that visit her temperature was noted to be 100F with tender, non fluctuant left cervical mass.  Group A strep NAAT was negative and throat swab sent for culture.   Patient given another presciption for Augmentin.  Patient was seen by PCP on 1/2/18 for complaint of continued fevers and CBC was again normal but sed rate elevated at 75.  She was given Rocephin injection and continued Augmentin and finished course last Tuesday.  Father states that patient improved and fevers subsided to the point that she was able to attend school for 5 days the week of January 8th.  By Friday she developed fevers again and repeat ultrasound at  [facility]    Phone Message    May a detailed message be left on voicemail: yes    Reason for Call: Medication Refill Request    Has the patient contacted the pharmacy for the refill? Yes   Name of medication being requested: insulin aspart (NOVOLOG FLEXPEN) 100 UNIT/ML injection    Provider who prescribed the medication: Alisa West/ was last filled by Felicia Tolliver APRN CNP  Pharmacy:      [facility] [address] [place] [address] [address] AT [facility] [address]    Date medication is needed: ASAP/ patient has had very high blood sugars          Action Taken: Message routed to:  [facility] ENDO     MDI on 1/15/18 reported multiple enlarged left cervical lymph nodes measuring 2.5 x 1.0 x 1.9 cm and adjacent node at 1.9 x 0.9 x 2.0 cm.  There was no necrosis or abscess formation or perfusion abnormalities.  There was also enlargement of a left submandibular node with long axis of 2.5 cm.  No abnormalities seen in right neck. Dr. Calderon sent her to our office for further evaluation and possible tissue biopsy if indicated.  He has also ordered some repeat lab testing to include CBC, CMP, sed rate and CRP.      Patient presents today with continued low grade fevers around 100.7F.  She denies any neck tenderness or limited head rotation/mobility.  She denies any overlying skin erythema, cough, recent travel, contact with cats or scratched by cat, sore throat or other sick contacts.     PAST HISTORY:  Past Medical History:   Diagnosis Date   • Eczema    • RAD (reactive airway disease) with wheezing 05/2017     History reviewed. No pertinent surgical history.  Outpatient Prescriptions Marked as Taking for the 1/16/18 encounter (Office Visit) with Bailee Nicole MD   Medication Sig Dispense Refill   • Pediatric Multivit-Minerals-C (CHILDRENS MULTIVITAMIN PO) Take  by mouth.       Allergies as of 01/16/2018   • (No Known Allergies)     History reviewed. No pertinent family history.  Pediatric History   Patient Guardian Status   • Mother:  EUNICE RUFFIN   • Father:  JOAQUIN MAYBERRY     Other Topics Concern   • Not on file     Social History Narrative    History of prematurity - No.       Complications of pregnancy or birth -  No.       History of intubation or mechanical ventilation -  No.      Problems with development of speech or coordination - No. History of learning issues - No.       History of feeding difficulties - No.      History of noisy breathing - No.       Immunizations NOT up to date - No.      Family History of alcohol, cigarette or drug use - No.      Child lives with parents.       Legal guardian  of child is parents.       Parental marital status - .          No family history of bleeding disorders or anesthesia reactions.     ROS:  I have reviewed the ROS as collected and documented by the medical assistant.    PHYSICAL EXAM:  Temperature 100.6 °F (38.1 °C), temperature source Temporal Artery, height 5' 4.75\" (1.645 m), weight (!) 67.2 kg, last menstrual period 12/23/2017. Body mass index is 24.84 kg/m².   General appearance:  This is a healthy-appearing child in no apparent distress. There is normal voice quality and speech for age.  Head & Face:  Inspection of the head and face reveals the overall appearance to be without scars, lesions or masses. There is no palpable sinus or TMJ tenderness. There are no palpable parotid or submandibular masses, and facial strength is symmetric and intact bilaterally. Facial sensation is intact.  Eyes:  There is no injection of the conjunctiva. The sclera are clear. Ocular motility is intact and primary gaze is in alignment.  External ears:  There are no lesions or deformities of the external ear.  Ear canals:  There is no erythema or thickening of the ear canals.  Tympanic membranes:  The tympanic membranes are normally mobile without signs of fluid or infection. There is no obvious erythema or thickening of the eardrums.  External nose:  There is no significant external nasal deformity.  Septum:  There is no significant septal deviation.  Turbinates:  The turbinates are nonobstructive moderate size and mucosa is moist.  Middle meatus:  There is no significant swelling, erythema, pus, or polyps.  Oral cavity:  No masses, lesions, erythema, or swelling of the lips, teeth, gums, or floor of mouth.  Oropharynx:  No masses, lesions, erythema, or swelling of the oral mucosa or tongue. Posterior pharynx, base of tongue and hard and soft palate are clear.  Mirror exam is deferred in children.   Tonsils:  Tonsils are 1-2+ bilaterally, no erythema or exudate.   Neck:  The  neck shows large area of matted adenopathy in left neck with no discretely palpable nodes.    Thyroid:  No tenderness, nodularity, masses, or asymmetry is noted.  Neurological:  No focal deficits with cranial nerves 2-12 grossly intact. Age appropriate mood and affect.  Age appropriate orientation to person, place, and time.     DIAGNOSIS:  1. Cervical lymphadenopathy      PLAN:  Discussed case with father and workup for atypical lymphadenopathy for infectious/inflammatory causes versus malignancy.  I discussed case with Dr. Macario Ayon at [facility] and he recommended further evaluation at [facility] ER with CT neck, further lab studies and continuation of antibiotic therapy.  Father is agreeable with this plan and we will assist as needed in transfer of care to Dr. Ayon.    cc:  Zack Calderon MD

## 2018-08-17 ENCOUNTER — HOSPITAL ENCOUNTER (OUTPATIENT)
Dept: LAB | Facility: CLINIC | Age: 53
Discharge: HOME | End: 2018-08-17
Payer: COMMERCIAL

## 2018-08-17 DIAGNOSIS — E87.5 HYPERKALEMIA: ICD-10-CM

## 2018-08-17 DIAGNOSIS — Z94.4 LIVER REPLACED BY TRANSPLANT (H): ICD-10-CM

## 2018-08-17 DIAGNOSIS — N18.30 CKD (CHRONIC KIDNEY DISEASE) STAGE 3, GFR 30-59 ML/MIN (H): ICD-10-CM

## 2018-08-17 LAB
ALBUMIN SERPL-MCNC: 3.7 G/DL (ref 3.4–5)
ALP SERPL-CCNC: 438 U/L (ref 40–150)
ALT SERPL W P-5'-P-CCNC: 97 U/L (ref 0–70)
ANION GAP SERPL CALCULATED.3IONS-SCNC: 7 MMOL/L (ref 3–14)
AST SERPL W P-5'-P-CCNC: 62 U/L (ref 0–45)
BILIRUB DIRECT SERPL-MCNC: 0.4 MG/DL (ref 0–0.2)
BILIRUB SERPL-MCNC: 0.9 MG/DL (ref 0.2–1.3)
BUN SERPL-MCNC: 34 MG/DL (ref 7–30)
CALCIUM SERPL-MCNC: 8.5 MG/DL (ref 8.5–10.1)
CHLORIDE SERPL-SCNC: 104 MMOL/L (ref 94–109)
CO2 SERPL-SCNC: 25 MMOL/L (ref 20–32)
CREAT SERPL-MCNC: 1.41 MG/DL (ref 0.66–1.25)
CREAT UR-MCNC: 117 MG/DL
ERYTHROCYTE [DISTWIDTH] IN BLOOD BY AUTOMATED COUNT: 14 % (ref 10–15)
GFR SERPL CREATININE-BSD FRML MDRD: 52 ML/MIN/1.7M2
GLUCOSE SERPL-MCNC: 297 MG/DL (ref 70–99)
HCT VFR BLD AUTO: 33.4 % (ref 40–53)
HGB BLD-MCNC: 11.2 G/DL (ref 13.3–17.7)
MCH RBC QN AUTO: 31.2 PG (ref 26.5–33)
MCHC RBC AUTO-ENTMCNC: 33.5 G/DL (ref 31.5–36.5)
MCV RBC AUTO: 93 FL (ref 78–100)
PHOSPHATE SERPL-MCNC: 3.5 MG/DL (ref 2.5–4.5)
PLATELET # BLD AUTO: 76 10E9/L (ref 150–450)
POTASSIUM SERPL-SCNC: 5.5 MMOL/L (ref 3.4–5.3)
PROT SERPL-MCNC: 6.9 G/DL (ref 6.8–8.8)
PROT UR-MCNC: 0.6 G/L
PROT/CREAT 24H UR: 0.51 G/G CR (ref 0–0.2)
RBC # BLD AUTO: 3.59 10E12/L (ref 4.4–5.9)
SODIUM SERPL-SCNC: 136 MMOL/L (ref 133–144)
TACROLIMUS BLD-MCNC: 6.3 UG/L (ref 5–15)
TME LAST DOSE: NORMAL H
WBC # BLD AUTO: 4 10E9/L (ref 4–11)

## 2018-08-17 PROCEDURE — 80197 ASSAY OF TACROLIMUS: CPT | Performed by: INTERNAL MEDICINE

## 2018-08-17 PROCEDURE — 84155 ASSAY OF PROTEIN SERUM: CPT

## 2018-08-17 PROCEDURE — 84450 TRANSFERASE (AST) (SGOT): CPT

## 2018-08-17 PROCEDURE — 82248 BILIRUBIN DIRECT: CPT

## 2018-08-17 PROCEDURE — 84156 ASSAY OF PROTEIN URINE: CPT

## 2018-08-17 PROCEDURE — 84075 ASSAY ALKALINE PHOSPHATASE: CPT

## 2018-08-17 PROCEDURE — 80048 BASIC METABOLIC PNL TOTAL CA: CPT

## 2018-08-17 PROCEDURE — 84460 ALANINE AMINO (ALT) (SGPT): CPT

## 2018-08-17 PROCEDURE — 82247 BILIRUBIN TOTAL: CPT

## 2018-08-17 PROCEDURE — 80069 RENAL FUNCTION PANEL: CPT

## 2018-08-17 PROCEDURE — 85027 COMPLETE CBC AUTOMATED: CPT

## 2018-08-20 ENCOUNTER — TELEPHONE (OUTPATIENT)
Dept: NEPHROLOGY | Facility: CLINIC | Age: 53
End: 2018-08-20

## 2018-08-21 ENCOUNTER — HOSPITAL ENCOUNTER (OUTPATIENT)
Dept: LAB | Facility: CLINIC | Age: 53
Discharge: HOME | End: 2018-08-21
Attending: INTERNAL MEDICINE
Payer: COMMERCIAL

## 2018-08-21 DIAGNOSIS — Z94.4 LIVER REPLACED BY TRANSPLANT (H): ICD-10-CM

## 2018-08-21 DIAGNOSIS — E87.5 HYPERKALEMIA: ICD-10-CM

## 2018-08-21 LAB
ALBUMIN SERPL-MCNC: 3.6 G/DL (ref 3.4–5)
ALP SERPL-CCNC: 413 U/L (ref 40–150)
ALT SERPL W P-5'-P-CCNC: 83 U/L (ref 0–70)
ANION GAP SERPL CALCULATED.3IONS-SCNC: 6 MMOL/L (ref 3–14)
AST SERPL W P-5'-P-CCNC: 57 U/L (ref 0–45)
BILIRUB DIRECT SERPL-MCNC: 0.4 MG/DL (ref 0–0.2)
BILIRUB SERPL-MCNC: 1.1 MG/DL (ref 0.2–1.3)
BUN SERPL-MCNC: 37 MG/DL (ref 7–30)
CALCIUM SERPL-MCNC: 8.5 MG/DL (ref 8.5–10.1)
CHLORIDE SERPL-SCNC: 108 MMOL/L (ref 94–109)
CO2 SERPL-SCNC: 25 MMOL/L (ref 20–32)
CREAT SERPL-MCNC: 1.37 MG/DL (ref 0.66–1.25)
ERYTHROCYTE [DISTWIDTH] IN BLOOD BY AUTOMATED COUNT: 14.1 % (ref 10–15)
GFR SERPL CREATININE-BSD FRML MDRD: 54 ML/MIN/1.7M2
GLUCOSE SERPL-MCNC: 270 MG/DL (ref 70–99)
HCT VFR BLD AUTO: 32.7 % (ref 40–53)
HGB BLD-MCNC: 11 G/DL (ref 13.3–17.7)
MCH RBC QN AUTO: 31.3 PG (ref 26.5–33)
MCHC RBC AUTO-ENTMCNC: 33.6 G/DL (ref 31.5–36.5)
MCV RBC AUTO: 93 FL (ref 78–100)
PHOSPHATE SERPL-MCNC: 2.9 MG/DL (ref 2.5–4.5)
PLATELET # BLD AUTO: 78 10E9/L (ref 150–450)
POTASSIUM SERPL-SCNC: 5.3 MMOL/L (ref 3.4–5.3)
PROT SERPL-MCNC: 6.9 G/DL (ref 6.8–8.8)
RBC # BLD AUTO: 3.51 10E12/L (ref 4.4–5.9)
SODIUM SERPL-SCNC: 139 MMOL/L (ref 133–144)
TACROLIMUS BLD-MCNC: 4.5 UG/L (ref 5–15)
TME LAST DOSE: ABNORMAL H
WBC # BLD AUTO: 3.4 10E9/L (ref 4–11)

## 2018-08-21 PROCEDURE — 80197 ASSAY OF TACROLIMUS: CPT | Performed by: INTERNAL MEDICINE

## 2018-08-21 PROCEDURE — 82248 BILIRUBIN DIRECT: CPT

## 2018-08-21 PROCEDURE — 85027 COMPLETE CBC AUTOMATED: CPT

## 2018-08-21 PROCEDURE — 84460 ALANINE AMINO (ALT) (SGPT): CPT

## 2018-08-21 PROCEDURE — 80069 RENAL FUNCTION PANEL: CPT

## 2018-08-21 PROCEDURE — 82247 BILIRUBIN TOTAL: CPT

## 2018-08-21 PROCEDURE — 36415 COLL VENOUS BLD VENIPUNCTURE: CPT

## 2018-08-21 PROCEDURE — 84075 ASSAY ALKALINE PHOSPHATASE: CPT

## 2018-08-21 PROCEDURE — 36415 COLL VENOUS BLD VENIPUNCTURE: CPT | Performed by: INTERNAL MEDICINE

## 2018-08-21 PROCEDURE — 84450 TRANSFERASE (AST) (SGOT): CPT

## 2018-08-21 PROCEDURE — 84155 ASSAY OF PROTEIN SERUM: CPT

## 2018-08-22 ENCOUNTER — OFFICE VISIT (OUTPATIENT)
Dept: NEPHROLOGY | Facility: CLINIC | Age: 53
End: 2018-08-22
Attending: INTERNAL MEDICINE
Payer: COMMERCIAL

## 2018-08-22 VITALS
SYSTOLIC BLOOD PRESSURE: 167 MMHG | BODY MASS INDEX: 30.46 KG/M2 | DIASTOLIC BLOOD PRESSURE: 96 MMHG | OXYGEN SATURATION: 100 % | HEART RATE: 77 BPM | WEIGHT: 224.6 LBS | TEMPERATURE: 98.9 F

## 2018-08-22 DIAGNOSIS — R80.1 PERSISTENT PROTEINURIA: ICD-10-CM

## 2018-08-22 DIAGNOSIS — N18.30 CKD (CHRONIC KIDNEY DISEASE) STAGE 3, GFR 30-59 ML/MIN (H): Primary | ICD-10-CM

## 2018-08-22 DIAGNOSIS — E87.5 HYPERKALEMIA: ICD-10-CM

## 2018-08-22 DIAGNOSIS — I10 BENIGN ESSENTIAL HYPERTENSION: ICD-10-CM

## 2018-08-22 PROBLEM — A08.39 CMV COLITIS (H): Status: ACTIVE | Noted: 2018-08-22

## 2018-08-22 PROBLEM — B25.9 CMV COLITIS (H): Status: ACTIVE | Noted: 2018-08-22

## 2018-08-22 PROCEDURE — G0463 HOSPITAL OUTPT CLINIC VISIT: HCPCS | Mod: ZF

## 2018-08-22 RX ORDER — LOSARTAN POTASSIUM 50 MG/1
50 TABLET ORAL DAILY
Qty: 90 TABLET | Refills: 3 | Status: SHIPPED | OUTPATIENT
Start: 2018-08-22 | End: 2019-01-09

## 2018-08-22 ASSESSMENT — PAIN SCALES - GENERAL: PAINLEVEL: NO PAIN (0)

## 2018-08-22 NOTE — LETTER
8/22/2018      RE: Camacho Bhagat  [address]  [facility] [id number]       Nephrology Clinic Visit 8/22/18    Assessment and Plan:       1. CKD3 w/proteinuria - Creat 1.3. Baseline in the mid 1 range. UPCR 0.5 g/gCr today   Etiology of CKD likely multifactorial; DM, recurrent EJ, CNI. Has been intolerant of ACE/ARB previously given problems with hyperkalemia assoc with Tacrolimus, but retrial when TAC level was <0.3 did not result in Hyperkalemia. In fact Valtessa had been discontinued because he was becoming hypokalemic.     Patient was restarted on ARB in 4/18 for unexplained nephrotic range proteinuria following ostomy takedown with improvement in UPCR, but then developed mild acute rejection and Tac dose was increased resulting in hyperkalemia   He was restarted on Valtassa with improvement in hyperkalemia  Current blood pressures still not at goal.       - Patient developed significant unexplained proteinuria at the end of January 2018 following his ileostomy takedown. Baseline Urine protein/creat had been in the 0.8 g/gCr range and then jumped to 9.57 g/gCr on 1/31/18. Blood pressures were in the 160-170/ range at that time. SPEP/immunofixation neg for monoclonal protein, Kappa/Lambda light chain ratio normal. On 2/28/18 Urine protein/creat ratio declined to 3.59 g/gCr. UA neg for hematuria. Patient was started on Losartan on 4/4/18 and now UPCR has declined further to 0.5 g/gCr which was better than his baseline.  A1c 5.5% in April 2018         -  Given uncontrolled HTN, will increase Losartan to 50 mg every day. Would like to see his blood pressure in the 120-130/ range        - OK to have lab frequency per liver transplant    - Avoid NSAIDs, nephrotoxins   - A1c goal is 7%. HgA1c 5.5% April 2018   - B/P goal is < 130/80. Not at goal       2. HTN - Uncontrolled. Home blood pressures 130-150/ on Losartan 25 mg every day. HR 53-72.  No edema. No dyspnea or CP. His weight has not stabilized.     Current antihypertensive regimen:      Lasix 40 mg bid     Amlodipine 10 mg qd      Toprol XL 50 mg every day       Losartan 25 mg qd    - Increase Losartan to 50 mg every day   - Will not increase BB given relative bradycardia   - Continue labs per transplant   - Continue to monitor blood pressures with increase in Losartan.       3. Hyperkalemia assoc with Tac/ARB - Potassium running in the low 5s' on Losartan 25 mg every day and Valtassa 8.4 g/day. Now increasing Losartan to 50 mg every day.     -  Increase Valtassa to 16.8 g/day   - Will have chemistries drawn per transplant ( 2 times/week through Sept)      4. Volume status - Euvolemic. No edema/dyspnea. Does have increased stools when TAC dose is increased. Weight 224.6 # today, stable from last visit. Blood pressures not at goal.    - Continue Lasix 40 mg bid.       5. Acid base - No acute concerns. Metabolic acidosis resolved following ileostomy takedown. Bicarb 25.   Off supplement.       6. BMD - Ca 8.5, Phos 2.9, albumin 3.6   - Vitamin D 20 and PTH 34 (9/14/17). Check next visit   - Continue Vit D 1000 U qd      7.Anemia - Recovered following surgery, Hgb up to 11.0.    -  Iron studies 7/18: Ferritin 1338, Fe 166, IS 73%   - Had colonoscopy 2017 (poor study), Ileoscopy 8/17 - nml   - Not on iron and has not needed DIPAK      8. Liver transplant IS: Tacrolimus, MMF, Pred. Tac level 4.5   - Tacro dose increased given mild rejection in June 9. Dispo- RCT 1 month for f/u      Reason for Visit:  Hyperkalemia/CKD/HTN follow up        HPI:  Mr Bhagat is a 51 yo male in today for Hyperkalemia/HTN/CKD3 f/u. He has h/o hyperkalemia attributed to Tacrolimus and was on undetectable levels of Tac prior to recent finding of mild rejection. Tac level subsequently increased with rise in K. Also following closely for unexplained nephrotic range proteinuria which developed post ostomy takedown in Jan 2018. Between improved blood pressure control and addition of ARB his  UPCR has essentially returned to baseline.         Interval Hx:      No hospital admissions.   Still being treated for mild acute liver transplant rejection with resultant increase in his Tac dose. No Tac goal has been established and currently running in the 5-7 range.    - He was started on Losartan during 4/4/18 visit with Dr Rubio for treatment of proteinuria with significant improvement.    - Hyperkalemia once again become a problem, so he was restarted on Valtassa 8.4 g/day at 7/18 visit.     - He has remained on Losartan 25 mg every day since our visit on 7/6/18   - Potassium has ranged in the low 5's on this regimen with occ bumps to upper 5 range  - Tac levels have been in the mid to upper 6's, but today down to 4.5 w/o specific correlation with his higher K levels.    - Creat stable in the mid 1's      ROS:  No complaints. Finally feeling improved over the last several weeks following all of the liver rejection issues.  No dyspnea, CP, abdominal pain. Voiding w/o difficulty. Exercising regularly. Considering going back to work. Appetite is good.         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 Former smoker,   by profession. 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     Allergies:  Allergies   Allergen Reactions     Erythromycin GI Disturbance     Vioxx      Nausea, vomiting       Medications:  Prior to Admission medications    Medication Sig Start Date End Date Taking? Authorizing Provider   amLODIPine (NORVASC) 10 MG tablet Take 1 tablet (10 mg) by mouth daily  Patient taking differently: Take 10 mg by mouth every morning   11/7/17  Yes Ninfa Hernández MD   cholecalciferol (VITAMIN D3) 1000 UNIT tablet Take 1 tablet (1,000 Units) by mouth daily  Patient taking differently: Take 1,000 Units by mouth every morning  12/6/17  Yes Cinda Cazares NP   CIALIS 5 MG tablet Take 5 mg by mouth as needed  6/7/17  Yes Reported, Patient   furosemide (LASIX) 40 MG tablet Take 1 tablet (40 mg) by mouth 2 times daily 4/4/18  Yes Jael Rubio MD   gabapentin (NEURONTIN) 300 MG capsule TAKE ONE CAPSULE BY MOUTH THREE TIMES DAILY 7/30/18  Yes Toñito Camejo MD   Glucose Blood (BLOOD GLUCOSE TEST STRIPS) STRP 1 strip by Lancet route 4 times daily 4/11/18  Yes Alisa West MD   insulin glargine (LANTUS) 100 UNIT/ML injection Inject 50 Units Subcutaneous every morning 4/11/18  Yes Alisa West MD   insulin pen needle (BD ENE U/F) 32G X 4 MM Use 1 pen needles daily or as directed. 4/11/18  Yes Alisa West MD   linagliptin (TRADJENTA) 5 MG TABS tablet Take 1 tablet (5 mg) by mouth daily  Patient taking differently: Take 5 mg by mouth every morning  4/11/18  Yes Alisa West MD   losartan (COZAAR) 50 MG tablet Take 1 tablet (50 mg) by mouth daily 8/22/18  Yes Cinda Cazares NP   metoprolol succinate (TOPROL-XL) 50 MG 24 hr tablet Take 1 tablet (50 mg) by mouth daily  Patient taking differently: Take 50 mg by mouth every morning  4/4/18  Yes Jael Rubio MD   multivitamin, therapeutic with minerals (MULTI-VITAMIN) TABS tablet Take 1 tablet by mouth every morning   Yes Reported, Patient   mycophenolic acid (GENERIC EQUIVALENT) 360 MG EC tablet Take 2 tablets (720 mg) by mouth every 12 hours  Patient taking differently: Take 720 mg by mouth 2 times daily  11/20/17  Yes Jovan Tran MD   omeprazole (PRILOSEC) 20 MG CR capsule Take 1 capsule (20 mg) by mouth 2 times daily 5/7/18  Yes Toñito Camejo MD   patiromer (VELTASSA) 8.4 g packet Take 8.4 g by mouth 2  times daily 8/22/18  Yes Cinda Cazares NP   predniSONE (DELTASONE) 2.5 MG tablet Take 1 tablet (2.5 mg) by mouth daily  Patient taking differently: Take 2.5 mg by mouth every morning  2/5/18  Yes Jovan Tran MD   tacrolimus (GENERIC EQUIVALENT) 1 MG capsule Take 6mg (6capsules) every morning, and take 5mg (5capsules) every evening, take every 12 hours. 8/15/18  Yes Toñito Camejo MD   cyclobenzaprine (FLEXERIL) 10 MG tablet Take 1 tablet (10 mg) by mouth 3 times daily as needed for muscle spasms  Patient taking differently: Take 10 mg by mouth as needed for muscle spasms  9/8/17   Felicia Tolliver APRN CNP       Vitals:  BP (!) 167/96  Pulse 77  Temp 98.9  F (37.2  C) (Oral)  Wt 101.9 kg (224 lb 9.6 oz)  SpO2 100%  BMI 30.46 kg/m2    Exam:  Gen: Well groomed, pleasant  CARDIAC: RRR  LUNGS: CTA  ABDOMEN: Abdomin NT. Non distended.  EXT: No edema     Results:  Results for RONNIE ARIZMENDI (MRN [id number]) as of 8/23/2018 09:20   Ref. Range 8/21/2018 10:47   Sodium Latest Ref Range: 133 - 144 mmol/L 139   Potassium Latest Ref Range: 3.4 - 5.3 mmol/L 5.3   Chloride Latest Ref Range: 94 - 109 mmol/L 108   Carbon Dioxide Latest Ref Range: 20 - 32 mmol/L 25   Urea Nitrogen Latest Ref Range: 7 - 30 mg/dL 37 (H)   Creatinine Latest Ref Range: 0.66 - 1.25 mg/dL 1.37 (H)   GFR Estimate Latest Ref Range: >60 mL/min/1.7m2 54 (L)   GFR Estimate If Black Latest Ref Range: >60 mL/min/1.7m2 66   Calcium Latest Ref Range: 8.5 - 10.1 mg/dL 8.5   Anion Gap Latest Ref Range: 3 - 14 mmol/L 6   Phosphorus Latest Ref Range: 2.5 - 4.5 mg/dL 2.9   Albumin Latest Ref Range: 3.4 - 5.0 g/dL 3.6   Protein Total Latest Ref Range: 6.8 - 8.8 g/dL 6.9   Bilirubin Total Latest Ref Range: 0.2 - 1.3 mg/dL 1.1   Alkaline Phosphatase Latest Ref Range: 40 - 150 U/L 413 (H)   ALT Latest Ref Range: 0 - 70 U/L 83 (H)   AST Latest Ref Range: 0 - 45 U/L 57 (H)   Bilirubin Direct Latest Ref Range: 0.0 - 0.2 mg/dL 0.4 (H)   Glucose Latest  Ref Range: 70 - 99 mg/dL 270 (H)   WBC Latest Ref Range: 4.0 - 11.0 10e9/L 3.4 (L)   Hemoglobin Latest Ref Range: 13.3 - 17.7 g/dL 11.0 (L)   Hematocrit Latest Ref Range: 40.0 - 53.0 % 32.7 (L)   Platelet Count Latest Ref Range: 150 - 450 10e9/L 78 (L)   RBC Count Latest Ref Range: 4.4 - 5.9 10e12/L 3.51 (L)   MCV Latest Ref Range: 78 - 100 fl 93   MCH Latest Ref Range: 26.5 - 33.0 pg 31.3   MCHC Latest Ref Range: 31.5 - 36.5 g/dL 33.6   RDW Latest Ref Range: 10.0 - 15.0 % 14.1   Tacrolimus Last Dose Unknown 2300 08/20/2018   Tacrolimus Level Latest Ref Range: 5.0 - 15.0 ug/L 4.5 (L)       Cinda Cazares, NP

## 2018-08-22 NOTE — PATIENT INSTRUCTIONS
1. Increase Valtassa to 16.8 g daily  2. Increase Losartan to 50 mg daily  3. Let us know if blood pressure doesn't get to < 130/80 in the interim  4. Check blood pressure every other day

## 2018-08-22 NOTE — MR AVS SNAPSHOT
After Visit Summary   8/22/2018    Camacho Bhagat    MRN: [id number]           Patient Information     Date Of Birth          1965        Visit Information        Provider Department      8/22/2018 12:00 PM Cinda Cazares NP [facility]        Today's Diagnoses     Persistent proteinuria        Hyperkalemia        Benign essential hypertension        CMV colitis (H)          Care Instructions    1. Increase Valtassa to 16.8 g daily  2. Increase Losartan to 50 mg daily  3. Let us know if blood pressure doesn't get to < 130/80 in the interim  4. Check blood pressure every other day          Follow-ups after your visit        Follow-up notes from your care team     Return in about 4 weeks (around 9/19/2018).      Your next 10 appointments already scheduled     Aug 24, 2018  3:00 PM CDT   MR ABDOMEN MRCP W/O & W CONTRAST with UUMR1   [facility] [place], [facility] ([facility])    [address] [address] [phone]           Take your medicines as usual, unless your doctor tells you not to. Bring a list of your current medicines to your exam (including vitamins, minerals and over-the-counter drugs). Also bring the results of similar scans you may have had.    You may or may not receive IV contrast for this exam pending the discretion of the Radiologist.   Do not eat or drink for 6 hours prior to exam.  The MRI machine uses a strong magnet. Please wear clothes without metal (snaps, zippers). A sweatsuit works well, or we may give you a hospital gown.  Please remove any body piercings and hair extensions before you arrive. You will also remove watches, jewelry, hairpins, wallets, dentures, partial dental plates and hearing aids. You may wear contact lenses, and you may be able to wear your rings. We have a safe place to keep your personal items, but it is safer to leave them at home.  **IMPORTANT** THE INSTRUCTIONS BELOW  ARE ONLY FOR THOSE PATIENTS WHO HAVE BEEN PRESCRIBED SEDATION OR GENERAL ANESTHESIA DURING THEIR MRI PROCEDURE:  IF YOUR DOCTOR PRESCRIBED ORAL SEDATION (take medicine to help you relax during your exam):   You must get the medicine from your doctor (oral medication) before you arrive. Bring the medicine to the exam. Do not take it at home. You ll be told when to take it upon arriving for your exam.   Arrive one hour early. Bring someone who can take you home after the test. Your medicine will make you sleepy. After the exam, you may not drive, take a bus or take a taxi by yourself.  IF YOUR DOCTOR PRESCRIBED IV SEDATION:   Arrive one hour early. Bring someone who can take you home after the test. Your medicine will make you sleepy. After the exam, you may not drive, take a bus or take a taxi by yourself.   No eating 6 hours before your exam. You may have clear liquids up until 4 hours before your exam. (Clear liquids include water, clear tea, black coffee and fruit juice without pulp.)  IF YOUR DOCTOR PRESCRIBED ANESTHESIA (be asleep for your exam):   Arrive 1 1/2 hours early. Bring someone who can take you home after the test. You may not drive, take a bus or take a taxi by yourself.   No eating 8 hours before your exam. You may have clear liquids up until 4 hours before your exam. (Clear liquids include water, clear tea, black coffee and fruit juice without pulp.)   You will spend four to five hours in the recovery room.  If you have any questions, please contact your Imaging Department exam site.            Sep 05, 2018 12:30 PM CDT   (Arrive by 12:15 PM)   Return Liver Transplant with Toñito Camejo MD   [facility] ([facility])    [address] [place] [address]  [address]  [facility] [id number] [phone]            Sep 10, 2018  1:30 PM CDT   (Arrive by 1:15 PM)   RETURN ENDOCRINE with Alisa West MD   [facility] ([facility])     [address]  3rd Floor  [facility] [address]   [phone]            Sep 14, 2018 12:30 PM CDT   (Arrive by 12:00 PM)   Return Visit with Cinda Cazares NP   [facility] ([facility])    [address]  [address]  [facility] [id number] [phone]            Sep 17, 2018 10:30 AM CDT   (Arrive by 10:15 AM)   PHYSICAL with Shay Kirkpatrick MD   [facility] ([facility])    [address]  4th Floor  [facility] [id number] [phone]              Who to contact     If you have questions or need follow up information about today's clinic visit or your schedule please contact [facility] directly at [phone].  Normal or non-critical lab and imaging results will be communicated to you by MyChart, letter or phone within 4 business days after the clinic has received the results. If you do not hear from us within 7 days, please contact the clinic through [employer] or phone. If you have a critical or abnormal lab result, we will notify you by phone as soon as possible.  Submit refill requests through [employer] or call your pharmacy and they will forward the refill request to us. Please allow 3 business days for your refill to be completed.          Additional Information About Your Visit        MyChart Information     [employer] gives you secure access to your electronic health record. If you see a primary care provider, you can also send messages to your care team and make appointments. If you have questions, please call your primary care clinic.  If you do not have a primary care provider, please call [phone] and they will assist you.        Care EveryWhere ID     This is your Care EveryWhere ID. This could be used by other organizations to access your [place] medical records  [id number]        Your Vitals Were     Pulse Temperature Pulse Oximetry BMI (Body Mass Index)          77 98.9  F (37.2   C) (Oral) 100% 30.46 kg/m2         Blood Pressure from Last 3 Encounters:   08/22/18 (!) 167/96   07/25/18 164/89   07/25/18 164/89    Weight from Last 3 Encounters:   08/22/18 101.9 kg (224 lb 9.6 oz)   07/25/18 101.2 kg (223 lb)   07/06/18 101.2 kg (223 lb)              Today, you had the following     No orders found for display         Today's Medication Changes          These changes are accurate as of 8/22/18 12:33 PM.  If you have any questions, ask your nurse or doctor.               These medicines have changed or have updated prescriptions.        Dose/Directions    amLODIPine 10 MG tablet   Commonly known as:  NORVASC   This may have changed:  when to take this   Used for:  HTN (hypertension)        Dose:  10 mg   Take 1 tablet (10 mg) by mouth daily   Quantity:  90 tablet   Refills:  3       cholecalciferol 1000 UNIT tablet   Commonly known as:  vitamin D3   This may have changed:  when to take this   Used for:  Vitamin D deficiency        Dose:  1000 Units   Take 1 tablet (1,000 Units) by mouth daily   Quantity:  100 tablet   Refills:  3       cyclobenzaprine 10 MG tablet   Commonly known as:  FLEXERIL   This may have changed:  when to take this   Used for:  Immunosuppression (H)        Dose:  10 mg   Take 1 tablet (10 mg) by mouth 3 times daily as needed for muscle spasms   Quantity:  90 tablet   Refills:  0       linagliptin 5 MG Tabs tablet   Commonly known as:  TRADJENTA   This may have changed:  when to take this   Used for:  Type 2 diabetes mellitus with diabetic polyneuropathy, with long-term current use of insulin (H)        Dose:  5 mg   Take 1 tablet (5 mg) by mouth daily   Quantity:  90 tablet   Refills:  3       losartan 50 MG tablet   Commonly known as:  COZAAR   This may have changed:    - medication strength  - how much to take   Used for:  Persistent proteinuria, Hyperkalemia, Benign essential hypertension   Changed by:  Cinda Cazares NP        Dose:  50 mg   Take 1 tablet (50  mg) by mouth daily   Quantity:  90 tablet   Refills:  3       metoprolol succinate 50 MG 24 hr tablet   Commonly known as:  TOPROL-XL   This may have changed:  when to take this   Used for:  Benign essential hypertension, Persistent proteinuria, Hyperkalemia        Dose:  50 mg   Take 1 tablet (50 mg) by mouth daily   Quantity:  30 tablet   Refills:  11       mycophenolic acid 360 MG EC tablet   Commonly known as:  GENERIC EQUIVALENT   This may have changed:  when to take this   Used for:  History of liver transplant (H), Long-term use of immunosuppressant medication        Dose:  720 mg   Take 2 tablets (720 mg) by mouth every 12 hours   Quantity:  120 tablet   Refills:  3       patiromer 8.4 g packet   Commonly known as:  VELTASSA   This may have changed:  when to take this   Used for:  Hyperkalemia   Changed by:  Cinda Cazares NP        Dose:  8.4 g   Take 8.4 g by mouth 2 times daily   Quantity:  180 each   Refills:  3       predniSONE 2.5 MG tablet   Commonly known as:  DELTASONE   This may have changed:  when to take this   Used for:  Liver replaced by transplant (H), Long-term use of immunosuppressant medication        Dose:  2.5 mg   Take 1 tablet (2.5 mg) by mouth daily   Quantity:  90 tablet   Refills:  3            Where to get your medicines      These medications were sent to [facility] [address] [place] [address] [address] AT [facility] [address]  [address], [facility] [address]    Hours:  called pharm.  they do accept faxes Phone:  [phone]     losartan 50 MG tablet    patiromer 8.4 g packet                Primary Care Provider Office Phone # Fax #    Shay Kirkpatrick [phone]       [address] [facility] [address] [place] [address]        Equal Access to Services     FRANKLIN PORTILLO AH: Todd Carlisle, troy gaston, danielle kazeb dolan, devi duckworth. So [facility]  [phone].    ATENCIÓN: Si brandt garcia, tiene a zheng disposición servicios gratuitos de asistencia lingüística. Shahla montemayor [phone].    We comply with applicable federal civil rights laws and [place] laws. We do not discriminate on the basis of race, color, national origin, age, disability, sex, sexual orientation, or gender identity.            Thank you!     Thank you for choosing [facility]  for your care. Our goal is always to provide you with excellent care. Hearing back from our patients is one way we can continue to improve our services. Please take a few minutes to complete the written survey that you may receive in the mail after your visit with us. Thank you!             Your Updated Medication List - Protect others around you: Learn how to safely use, store and throw away your medicines at www.disposemymeds.org.          This list is accurate as of 8/22/18 12:33 PM.  Always use your most recent med list.                   Brand Name Dispense Instructions for use Diagnosis    amLODIPine 10 MG tablet    NORVASC    90 tablet    Take 1 tablet (10 mg) by mouth daily    HTN (hypertension)       BLOOD GLUCOSE TEST STRIPS Strp     100 each    1 strip by Lancet route 4 times daily    Type 2 diabetes mellitus with diabetic polyneuropathy, with long-term current use of insulin (H)       cholecalciferol 1000 UNIT tablet    vitamin D3    100 tablet    Take 1 tablet (1,000 Units) by mouth daily    Vitamin D deficiency       CIALIS 5 MG tablet   Generic drug:  tadalafil      Take 5 mg by mouth as needed        cyclobenzaprine 10 MG tablet    FLEXERIL    90 tablet    Take 1 tablet (10 mg) by mouth 3 times daily as needed for muscle spasms    Immunosuppression (H)       furosemide 40 MG tablet    LASIX    60 tablet    Take 1 tablet (40 mg) by mouth 2 times daily    Hyperkalemia, Persistent proteinuria, Benign essential hypertension       gabapentin 300 MG capsule    NEURONTIN    270 capsule    TAKE ONE  CAPSULE BY MOUTH THREE TIMES DAILY    Low back pain at multiple sites       insulin glargine 100 UNIT/ML injection    LANTUS    18 mL    Inject 50 Units Subcutaneous every morning    Type 2 diabetes mellitus with diabetic polyneuropathy, with long-term current use of insulin (H)       insulin pen needle 32G X 4 MM    BD ENE U/F    100 each    Use 1 pen needles daily or as directed.    Type 2 diabetes mellitus with diabetic polyneuropathy, with long-term current use of insulin (H)       linagliptin 5 MG Tabs tablet    TRADJENTA    90 tablet    Take 1 tablet (5 mg) by mouth daily    Type 2 diabetes mellitus with diabetic polyneuropathy, with long-term current use of insulin (H)       losartan 50 MG tablet    COZAAR    90 tablet    Take 1 tablet (50 mg) by mouth daily    Persistent proteinuria, Hyperkalemia, Benign essential hypertension       metoprolol succinate 50 MG 24 hr tablet    TOPROL-XL    30 tablet    Take 1 tablet (50 mg) by mouth daily    Benign essential hypertension, Persistent proteinuria, Hyperkalemia       Multi-vitamin Tabs tablet      Take 1 tablet by mouth every morning        mycophenolic acid 360 MG EC tablet    GENERIC EQUIVALENT    120 tablet    Take 2 tablets (720 mg) by mouth every 12 hours    History of liver transplant (H), Long-term use of immunosuppressant medication       omeprazole 20 MG CR capsule    priLOSEC    60 capsule    Take 1 capsule (20 mg) by mouth 2 times daily    Long-term use of immunosuppressant medication, History of liver transplant (H)       patiromer 8.4 g packet    VELTASSA    180 each    Take 8.4 g by mouth 2 times daily    Hyperkalemia       predniSONE 2.5 MG tablet    DELTASONE    90 tablet    Take 1 tablet (2.5 mg) by mouth daily    Liver replaced by transplant (H), Long-term use of immunosuppressant medication       tacrolimus 1 MG capsule    GENERIC EQUIVALENT    330 capsule    Take 6mg (6capsules) every morning, and take 5mg (5capsules) every evening, take  every 12 hours.    Liver transplant recipient (H)

## 2018-08-22 NOTE — NURSING NOTE
Chief Complaint   Patient presents with     RECHECK     follow up protein in urine and liver tx   BP (!) 167/96  Pulse 77  Temp 98.9  F (37.2  C) (Oral)  Wt 101.9 kg (224 lb 9.6 oz)  SpO2 100%  BMI 30.46 kg/m2   Marcella Rowe

## 2018-08-23 ENCOUNTER — TELEPHONE (OUTPATIENT)
Dept: NEPHROLOGY | Facility: CLINIC | Age: 53
End: 2018-08-23

## 2018-08-23 NOTE — TELEPHONE ENCOUNTER
Prior Authorization Retail Medication Request    Medication/Dose: Veltassa 8.4 mg powder BID  ICD code (if different than what is on RX):  Hyperkalemia (E87.5)  Previously Tried and Failed:    Rationale:      Insurance Name:  Number [phone]  Insurance ID:  [id number]      Pharmacy Information (if different than what is on RX)  [facility]:  [facility]  Phone:  [phone]

## 2018-08-23 NOTE — PROGRESS NOTES
Nephrology Clinic Visit 8/22/18    Assessment and Plan:       1. CKD3 w/proteinuria - Creat 1.3. Baseline in the mid 1 range. UPCR 0.5 g/gCr today   Etiology of CKD likely multifactorial; DM, recurrent EJ, CNI. Has been intolerant of ACE/ARB previously given problems with hyperkalemia assoc with Tacrolimus, but retrial when TAC level was <0.3 did not result in Hyperkalemia. In fact Valtessa had been discontinued because he was becoming hypokalemic.     Patient was restarted on ARB in 4/18 for unexplained nephrotic range proteinuria following ostomy takedown with improvement in UPCR, but then developed mild acute rejection and Tac dose was increased resulting in hyperkalemia   He was restarted on Valtassa with improvement in hyperkalemia  Current blood pressures still not at goal.       - Patient developed significant unexplained proteinuria at the end of January 2018 following his ileostomy takedown. Baseline Urine protein/creat had been in the 0.8 g/gCr range and then jumped to 9.57 g/gCr on 1/31/18. Blood pressures were in the 160-170/ range at that time. SPEP/immunofixation neg for monoclonal protein, Kappa/Lambda light chain ratio normal. On 2/28/18 Urine protein/creat ratio declined to 3.59 g/gCr. UA neg for hematuria. Patient was started on Losartan on 4/4/18 and now UPCR has declined further to 0.5 g/gCr which was better than his baseline.  A1c 5.5% in April 2018         - Given uncontrolled HTN, will increase Losartan to 50 mg every day. Would like to see his blood pressure in the 120-130/ range        - OK to have lab frequency per liver transplant    - Avoid NSAIDs, nephrotoxins   - A1c goal is 7%. HgA1c 5.5% April 2018   - B/P goal is < 130/80. Not at goal       2. HTN - Uncontrolled. Home blood pressures 130-150/ on Losartan 25 mg every day. HR 53-72.  No edema. No dyspnea or CP. His weight has not stabilized.    Current antihypertensive regimen:      Lasix 40 mg bid     Amlodipine 10 mg qd      Toprol  XL 50 mg every day       Losartan 25 mg qd    - Increase Losartan to 50 mg every day   - Will not increase BB given relative bradycardia   - Continue labs per transplant   - Continue to monitor blood pressures with increase in Losartan.       3. Hyperkalemia assoc with Tac/ARB - Potassium running in the low 5s' on Losartan 25 mg every day and Valtassa 8.4 g/day. Now increasing Losartan to 50 mg every day.     - Increase Valtassa to 16.8 g/day   - Will have chemistries drawn per transplant ( 2 times/week through Sept)      4. Volume status - Euvolemic. No edema/dyspnea. Does have increased stools when TAC dose is increased. Weight 224.6 # today, stable from last visit. Blood pressures not at goal.    - Continue Lasix 40 mg bid.       5. Acid base - No acute concerns. Metabolic acidosis resolved following ileostomy takedown. Bicarb 25.   Off supplement.       6. BMD - Ca 8.5, Phos 2.9, albumin 3.6   - Vitamin D 20 and PTH 34 (9/14/17). Check next visit   - Continue Vit D 1000 U qd      7.Anemia - Recovered following surgery, Hgb up to 11.0.    - Iron studies 7/18: Ferritin 1338, Fe 166, IS 73%   - Had colonoscopy 2017 (poor study), Ileoscopy 8/17 - nml   - Not on iron and has not needed DIPAK      8. Liver transplant IS: Tacrolimus, MMF, Pred. Tac level 4.5   - Tacro dose increased given mild rejection in June 9. Dispo- RCT 1 month for f/u      Reason for Visit:  Hyperkalemia/CKD/HTN follow up        HPI:  Mr Bhagat is a 51 yo male in today for Hyperkalemia/HTN/CKD3 f/u. He has h/o hyperkalemia attributed to Tacrolimus and was on undetectable levels of Tac prior to recent finding of mild rejection. Tac level subsequently increased with rise in K. Also following closely for unexplained nephrotic range proteinuria which developed post ostomy takedown in Jan 2018. Between improved blood pressure control and addition of ARB his UPCR has essentially returned to baseline.         Interval Hx:      No hospital admissions.    Still being treated for mild acute liver transplant rejection with resultant increase in his Tac dose. No Tac goal has been established and currently running in the 5-7 range.    - He was started on Losartan during 4/4/18 visit with Dr Rubio for treatment of proteinuria with significant improvement.    - Hyperkalemia once again become a problem, so he was restarted on Valtassa 8.4 g/day at 7/18 visit.     - He has remained on Losartan 25 mg every day since our visit on 7/6/18   - Potassium has ranged in the low 5's on this regimen with occ bumps to upper 5 range  - Tac levels have been in the mid to upper 6's, but today down to 4.5 w/o specific correlation with his higher K levels.    - Creat stable in the mid 1's      ROS:  No complaints. Finally feeling improved over the last several weeks following all of the liver rejection issues.  No dyspnea, CP, abdominal pain. Voiding w/o difficulty. Exercising regularly. Considering going back to work. Appetite is good.         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 Former smoker,   by profession. 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     Allergies:  Allergies   Allergen Reactions     Erythromycin GI Disturbance     Vioxx      Nausea, vomiting       Medications:  Prior to Admission medications    Medication Sig Start Date End Date Taking? Authorizing Provider   amLODIPine (NORVASC) 10 MG tablet Take 1 tablet (10 mg) by mouth daily  Patient taking differently: Take 10 mg by mouth every morning  11/7/17  Yes Ninfa Hernández MD   cholecalciferol (VITAMIN D3) 1000 UNIT tablet Take 1  tablet (1,000 Units) by mouth daily  Patient taking differently: Take 1,000 Units by mouth every morning  12/6/17  Yes Cinda Cazares NP   CIALIS 5 MG tablet Take 5 mg by mouth as needed  6/7/17  Yes Reported, Patient   furosemide (LASIX) 40 MG tablet Take 1 tablet (40 mg) by mouth 2 times daily 4/4/18  Yes Jael Rubio MD   gabapentin (NEURONTIN) 300 MG capsule TAKE ONE CAPSULE BY MOUTH THREE TIMES DAILY 7/30/18  Yes Toñito Camejo MD   Glucose Blood (BLOOD GLUCOSE TEST STRIPS) STRP 1 strip by Lancet route 4 times daily 4/11/18  Yes Alisa West MD   insulin glargine (LANTUS) 100 UNIT/ML injection Inject 50 Units Subcutaneous every morning 4/11/18  Yes Alisa West MD   insulin pen needle (BD ENE U/F) 32G X 4 MM Use 1 pen needles daily or as directed. 4/11/18  Yes Alisa West MD   linagliptin (TRADJENTA) 5 MG TABS tablet Take 1 tablet (5 mg) by mouth daily  Patient taking differently: Take 5 mg by mouth every morning  4/11/18  Yes Alisa West MD   losartan (COZAAR) 50 MG tablet Take 1 tablet (50 mg) by mouth daily 8/22/18  Yes Cinda Cazares NP   metoprolol succinate (TOPROL-XL) 50 MG 24 hr tablet Take 1 tablet (50 mg) by mouth daily  Patient taking differently: Take 50 mg by mouth every morning  4/4/18  Yes Jael Rubio MD   multivitamin, therapeutic with minerals (MULTI-VITAMIN) TABS tablet Take 1 tablet by mouth every morning   Yes Reported, Patient   mycophenolic acid (GENERIC EQUIVALENT) 360 MG EC tablet Take 2 tablets (720 mg) by mouth every 12 hours  Patient taking differently: Take 720 mg by mouth 2 times daily  11/20/17  Yes Jovan Tran MD   omeprazole (PRILOSEC) 20 MG CR capsule Take 1 capsule (20 mg) by mouth 2 times daily 5/7/18  Yes Toñito Camejo MD   patiromer (VELTASSA) 8.4 g packet Take 8.4 g by mouth 2 times daily 8/22/18  Yes Debora, Cinda Jiménez, NP   predniSONE (DELTASONE) 2.5 MG tablet Take 1  tablet (2.5 mg) by mouth daily  Patient taking differently: Take 2.5 mg by mouth every morning  2/5/18  Yes Jovan Tran MD   tacrolimus (GENERIC EQUIVALENT) 1 MG capsule Take 6mg (6capsules) every morning, and take 5mg (5capsules) every evening, take every 12 hours. 8/15/18  Yes Toñito Camejo MD   cyclobenzaprine (FLEXERIL) 10 MG tablet Take 1 tablet (10 mg) by mouth 3 times daily as needed for muscle spasms  Patient taking differently: Take 10 mg by mouth as needed for muscle spasms  9/8/17   Felicia Tolliver, APRN CNP       Vitals:  BP (!) 167/96  Pulse 77  Temp 98.9  F (37.2  C) (Oral)  Wt 101.9 kg (224 lb 9.6 oz)  SpO2 100%  BMI 30.46 kg/m2    Exam:  Gen: Well groomed, pleasant  CARDIAC: RRR  LUNGS: CTA  ABDOMEN: Abdomin NT. Non distended.  EXT: No edema     Results:  Results for RONNIE ARIZMENDI (MRN [id number]) as of 8/23/2018 09:20   Ref. Range 8/21/2018 10:47   Sodium Latest Ref Range: 133 - 144 mmol/L 139   Potassium Latest Ref Range: 3.4 - 5.3 mmol/L 5.3   Chloride Latest Ref Range: 94 - 109 mmol/L 108   Carbon Dioxide Latest Ref Range: 20 - 32 mmol/L 25   Urea Nitrogen Latest Ref Range: 7 - 30 mg/dL 37 (H)   Creatinine Latest Ref Range: 0.66 - 1.25 mg/dL 1.37 (H)   GFR Estimate Latest Ref Range: >60 mL/min/1.7m2 54 (L)   GFR Estimate If Black Latest Ref Range: >60 mL/min/1.7m2 66   Calcium Latest Ref Range: 8.5 - 10.1 mg/dL 8.5   Anion Gap Latest Ref Range: 3 - 14 mmol/L 6   Phosphorus Latest Ref Range: 2.5 - 4.5 mg/dL 2.9   Albumin Latest Ref Range: 3.4 - 5.0 g/dL 3.6   Protein Total Latest Ref Range: 6.8 - 8.8 g/dL 6.9   Bilirubin Total Latest Ref Range: 0.2 - 1.3 mg/dL 1.1   Alkaline Phosphatase Latest Ref Range: 40 - 150 U/L 413 (H)   ALT Latest Ref Range: 0 - 70 U/L 83 (H)   AST Latest Ref Range: 0 - 45 U/L 57 (H)   Bilirubin Direct Latest Ref Range: 0.0 - 0.2 mg/dL 0.4 (H)   Glucose Latest Ref Range: 70 - 99 mg/dL 270 (H)   WBC Latest Ref Range: 4.0 - 11.0 10e9/L 3.4 (L)   Hemoglobin  Latest Ref Range: 13.3 - 17.7 g/dL 11.0 (L)   Hematocrit Latest Ref Range: 40.0 - 53.0 % 32.7 (L)   Platelet Count Latest Ref Range: 150 - 450 10e9/L 78 (L)   RBC Count Latest Ref Range: 4.4 - 5.9 10e12/L 3.51 (L)   MCV Latest Ref Range: 78 - 100 fl 93   MCH Latest Ref Range: 26.5 - 33.0 pg 31.3   MCHC Latest Ref Range: 31.5 - 36.5 g/dL 33.6   RDW Latest Ref Range: 10.0 - 15.0 % 14.1   Tacrolimus Last Dose Unknown 2300 08/20/2018   Tacrolimus Level Latest Ref Range: 5.0 - 15.0 ug/L 4.5 (L)

## 2018-08-24 ENCOUNTER — HOSPITAL ENCOUNTER (OUTPATIENT)
Dept: MRI IMAGING | Facility: CLINIC | Age: 53
Discharge: HOME | End: 2018-08-24
Attending: INTERNAL MEDICINE | Admitting: INTERNAL MEDICINE
Payer: COMMERCIAL

## 2018-08-24 DIAGNOSIS — Z79.60 LONG-TERM USE OF IMMUNOSUPPRESSANT MEDICATION: ICD-10-CM

## 2018-08-24 DIAGNOSIS — Z94.4 HISTORY OF LIVER TRANSPLANT (H): ICD-10-CM

## 2018-08-24 DIAGNOSIS — R79.89 ABNORMAL LFTS: ICD-10-CM

## 2018-08-24 PROCEDURE — A9581 GADOXETATE DISODIUM INJ: HCPCS | Performed by: INTERNAL MEDICINE

## 2018-08-24 PROCEDURE — 25500064 ZZH RX 255 OP 636: Performed by: INTERNAL MEDICINE

## 2018-08-24 PROCEDURE — 74183 MRI ABD W/O CNTR FLWD CNTR: CPT

## 2018-08-24 RX ADMIN — GADOXETATE DISODIUM 10 ML: 181.43 INJECTION, SOLUTION INTRAVENOUS at 15:56

## 2018-08-24 NOTE — TELEPHONE ENCOUNTER
Central Prior Authorization Team   Phone: [phone]    PA Initiation Not required    Medication: Veltassa 8/4 mg powder BID - no Pa required  Insurance Company:    Pharmacy Filling the Rx: "[facility] STORE [address] [place] [address] [address] AT [facility] [address]  Filling Pharmacy Phone: [phone]  Filling Pharmacy Fax:    Start Date: 8/24/2018    pharamacy processed script through insurance, no PA required

## 2018-08-27 DIAGNOSIS — E87.5 HYPERKALEMIA: ICD-10-CM

## 2018-08-27 ASSESSMENT — ENCOUNTER SYMPTOMS
MUSCLE WEAKNESS: 0
MUSCLE CRAMPS: 1
NECK PAIN: 0
MYALGIAS: 0
JOINT SWELLING: 0
ARTHRALGIAS: 1
BACK PAIN: 0
STIFFNESS: 0

## 2018-08-27 NOTE — TELEPHONE ENCOUNTER
Per Jovanna:    Camacho needs mail order refill for his Valtassa. I increased the dose to 2 packets/day.     New script sent to mail order pharmacy with increased dose of Veltassa.    Quang Conteh RN

## 2018-08-28 ENCOUNTER — HOSPITAL ENCOUNTER (OUTPATIENT)
Dept: LAB | Facility: CLINIC | Age: 53
Discharge: HOME | End: 2018-08-28
Attending: INTERNAL MEDICINE
Payer: COMMERCIAL

## 2018-08-28 DIAGNOSIS — Z94.4 LIVER REPLACED BY TRANSPLANT (H): ICD-10-CM

## 2018-08-28 DIAGNOSIS — E87.5 HYPERKALEMIA: ICD-10-CM

## 2018-08-28 LAB
ALBUMIN SERPL-MCNC: 3.6 G/DL (ref 3.4–5)
ALP SERPL-CCNC: 413 U/L (ref 40–150)
ALT SERPL W P-5'-P-CCNC: 87 U/L (ref 0–70)
ANION GAP SERPL CALCULATED.3IONS-SCNC: 6 MMOL/L (ref 3–14)
AST SERPL W P-5'-P-CCNC: 59 U/L (ref 0–45)
BILIRUB DIRECT SERPL-MCNC: 0.4 MG/DL (ref 0–0.2)
BILIRUB SERPL-MCNC: 0.9 MG/DL (ref 0.2–1.3)
BUN SERPL-MCNC: 39 MG/DL (ref 7–30)
CALCIUM SERPL-MCNC: 8.2 MG/DL (ref 8.5–10.1)
CHLORIDE SERPL-SCNC: 108 MMOL/L (ref 94–109)
CO2 SERPL-SCNC: 24 MMOL/L (ref 20–32)
CREAT SERPL-MCNC: 1.28 MG/DL (ref 0.66–1.25)
ERYTHROCYTE [DISTWIDTH] IN BLOOD BY AUTOMATED COUNT: 14.5 % (ref 10–15)
GFR SERPL CREATININE-BSD FRML MDRD: 59 ML/MIN/1.7M2
GLUCOSE SERPL-MCNC: 234 MG/DL (ref 70–99)
HCT VFR BLD AUTO: 31.7 % (ref 40–53)
HGB BLD-MCNC: 10.7 G/DL (ref 13.3–17.7)
MCH RBC QN AUTO: 31.5 PG (ref 26.5–33)
MCHC RBC AUTO-ENTMCNC: 33.8 G/DL (ref 31.5–36.5)
MCV RBC AUTO: 93 FL (ref 78–100)
PHOSPHATE SERPL-MCNC: 2.8 MG/DL (ref 2.5–4.5)
PLATELET # BLD AUTO: 68 10E9/L (ref 150–450)
POTASSIUM SERPL-SCNC: 5 MMOL/L (ref 3.4–5.3)
PROT SERPL-MCNC: 6.7 G/DL (ref 6.8–8.8)
RBC # BLD AUTO: 3.4 10E12/L (ref 4.4–5.9)
SODIUM SERPL-SCNC: 138 MMOL/L (ref 133–144)
TACROLIMUS BLD-MCNC: 6.7 UG/L (ref 5–15)
TME LAST DOSE: NORMAL H
WBC # BLD AUTO: 3.9 10E9/L (ref 4–11)

## 2018-08-28 PROCEDURE — 84450 TRANSFERASE (AST) (SGOT): CPT

## 2018-08-28 PROCEDURE — 80197 ASSAY OF TACROLIMUS: CPT | Performed by: INTERNAL MEDICINE

## 2018-08-28 PROCEDURE — 36415 COLL VENOUS BLD VENIPUNCTURE: CPT | Performed by: INTERNAL MEDICINE

## 2018-08-28 PROCEDURE — 36415 COLL VENOUS BLD VENIPUNCTURE: CPT

## 2018-08-28 PROCEDURE — 84460 ALANINE AMINO (ALT) (SGPT): CPT

## 2018-08-28 PROCEDURE — 82247 BILIRUBIN TOTAL: CPT

## 2018-08-28 PROCEDURE — 80069 RENAL FUNCTION PANEL: CPT

## 2018-08-28 PROCEDURE — 85027 COMPLETE CBC AUTOMATED: CPT

## 2018-08-28 PROCEDURE — 82248 BILIRUBIN DIRECT: CPT

## 2018-08-28 PROCEDURE — 84075 ASSAY ALKALINE PHOSPHATASE: CPT

## 2018-08-28 PROCEDURE — 84155 ASSAY OF PROTEIN SERUM: CPT

## 2018-08-31 ENCOUNTER — HOSPITAL ENCOUNTER (OUTPATIENT)
Dept: LAB | Facility: CLINIC | Age: 53
Discharge: HOME | End: 2018-08-31
Attending: INTERNAL MEDICINE
Payer: COMMERCIAL

## 2018-08-31 DIAGNOSIS — Z94.4 LIVER REPLACED BY TRANSPLANT (H): ICD-10-CM

## 2018-08-31 DIAGNOSIS — E87.5 HYPERKALEMIA: ICD-10-CM

## 2018-08-31 LAB
ALBUMIN SERPL-MCNC: 3.9 G/DL (ref 3.4–5)
ALP SERPL-CCNC: 413 U/L (ref 40–150)
ALT SERPL W P-5'-P-CCNC: 91 U/L (ref 0–70)
ANION GAP SERPL CALCULATED.3IONS-SCNC: 6 MMOL/L (ref 3–14)
AST SERPL W P-5'-P-CCNC: 65 U/L (ref 0–45)
BILIRUB DIRECT SERPL-MCNC: 0.4 MG/DL (ref 0–0.2)
BILIRUB SERPL-MCNC: 1 MG/DL (ref 0.2–1.3)
BUN SERPL-MCNC: 44 MG/DL (ref 7–30)
CALCIUM SERPL-MCNC: 8.3 MG/DL (ref 8.5–10.1)
CHLORIDE SERPL-SCNC: 105 MMOL/L (ref 94–109)
CO2 SERPL-SCNC: 24 MMOL/L (ref 20–32)
CREAT SERPL-MCNC: 1.47 MG/DL (ref 0.66–1.25)
ERYTHROCYTE [DISTWIDTH] IN BLOOD BY AUTOMATED COUNT: 14.6 % (ref 10–15)
GFR SERPL CREATININE-BSD FRML MDRD: 50 ML/MIN/1.7M2
GLUCOSE SERPL-MCNC: 300 MG/DL (ref 70–99)
HCT VFR BLD AUTO: 31.9 % (ref 40–53)
HGB BLD-MCNC: 10.9 G/DL (ref 13.3–17.7)
MCH RBC QN AUTO: 31.7 PG (ref 26.5–33)
MCHC RBC AUTO-ENTMCNC: 34.2 G/DL (ref 31.5–36.5)
MCV RBC AUTO: 93 FL (ref 78–100)
PHOSPHATE SERPL-MCNC: 3.1 MG/DL (ref 2.5–4.5)
PLATELET # BLD AUTO: 72 10E9/L (ref 150–450)
POTASSIUM SERPL-SCNC: 5.1 MMOL/L (ref 3.4–5.3)
PROT SERPL-MCNC: 7 G/DL (ref 6.8–8.8)
RBC # BLD AUTO: 3.44 10E12/L (ref 4.4–5.9)
SODIUM SERPL-SCNC: 135 MMOL/L (ref 133–144)
WBC # BLD AUTO: 4.4 10E9/L (ref 4–11)

## 2018-08-31 PROCEDURE — 80048 BASIC METABOLIC PNL TOTAL CA: CPT

## 2018-08-31 PROCEDURE — 85027 COMPLETE CBC AUTOMATED: CPT

## 2018-08-31 PROCEDURE — 84155 ASSAY OF PROTEIN SERUM: CPT

## 2018-08-31 PROCEDURE — 82248 BILIRUBIN DIRECT: CPT

## 2018-08-31 PROCEDURE — 84450 TRANSFERASE (AST) (SGOT): CPT

## 2018-08-31 PROCEDURE — 84460 ALANINE AMINO (ALT) (SGPT): CPT

## 2018-08-31 PROCEDURE — 80197 ASSAY OF TACROLIMUS: CPT | Performed by: INTERNAL MEDICINE

## 2018-08-31 PROCEDURE — 80069 RENAL FUNCTION PANEL: CPT

## 2018-08-31 PROCEDURE — 82247 BILIRUBIN TOTAL: CPT

## 2018-08-31 PROCEDURE — 36415 COLL VENOUS BLD VENIPUNCTURE: CPT

## 2018-08-31 PROCEDURE — 84075 ASSAY ALKALINE PHOSPHATASE: CPT

## 2018-09-01 LAB
TACROLIMUS BLD-MCNC: 5.7 UG/L (ref 5–15)
TME LAST DOSE: NORMAL H

## 2018-09-04 ENCOUNTER — HOSPITAL ENCOUNTER (OUTPATIENT)
Dept: LAB | Facility: CLINIC | Age: 53
Discharge: HOME | End: 2018-09-04
Attending: INTERNAL MEDICINE
Payer: COMMERCIAL

## 2018-09-04 DIAGNOSIS — Z94.4 LIVER REPLACED BY TRANSPLANT (H): ICD-10-CM

## 2018-09-04 DIAGNOSIS — E87.5 HYPERKALEMIA: ICD-10-CM

## 2018-09-04 LAB
ALBUMIN SERPL-MCNC: 3.7 G/DL (ref 3.4–5)
ALP SERPL-CCNC: 390 U/L (ref 40–150)
ALT SERPL W P-5'-P-CCNC: 78 U/L (ref 0–70)
ANION GAP SERPL CALCULATED.3IONS-SCNC: 7 MMOL/L (ref 3–14)
AST SERPL W P-5'-P-CCNC: 61 U/L (ref 0–45)
BILIRUB DIRECT SERPL-MCNC: 0.4 MG/DL (ref 0–0.2)
BILIRUB SERPL-MCNC: 0.9 MG/DL (ref 0.2–1.3)
BUN SERPL-MCNC: 47 MG/DL (ref 7–30)
CALCIUM SERPL-MCNC: 8.4 MG/DL (ref 8.5–10.1)
CHLORIDE SERPL-SCNC: 109 MMOL/L (ref 94–109)
CO2 SERPL-SCNC: 22 MMOL/L (ref 20–32)
CREAT SERPL-MCNC: 1.47 MG/DL (ref 0.66–1.25)
ERYTHROCYTE [DISTWIDTH] IN BLOOD BY AUTOMATED COUNT: 14.6 % (ref 10–15)
GFR SERPL CREATININE-BSD FRML MDRD: 50 ML/MIN/1.7M2
GLUCOSE SERPL-MCNC: 208 MG/DL (ref 70–99)
HCT VFR BLD AUTO: 31.4 % (ref 40–53)
HGB BLD-MCNC: 10.9 G/DL (ref 13.3–17.7)
MCH RBC QN AUTO: 31.7 PG (ref 26.5–33)
MCHC RBC AUTO-ENTMCNC: 34.7 G/DL (ref 31.5–36.5)
MCV RBC AUTO: 91 FL (ref 78–100)
PHOSPHATE SERPL-MCNC: 2.9 MG/DL (ref 2.5–4.5)
PLATELET # BLD AUTO: 79 10E9/L (ref 150–450)
POTASSIUM SERPL-SCNC: 5.3 MMOL/L (ref 3.4–5.3)
PROT SERPL-MCNC: 6.8 G/DL (ref 6.8–8.8)
RBC # BLD AUTO: 3.44 10E12/L (ref 4.4–5.9)
SODIUM SERPL-SCNC: 138 MMOL/L (ref 133–144)
TACROLIMUS BLD-MCNC: 5.4 UG/L (ref 5–15)
TME LAST DOSE: NORMAL H
WBC # BLD AUTO: 3.7 10E9/L (ref 4–11)

## 2018-09-04 PROCEDURE — 84075 ASSAY ALKALINE PHOSPHATASE: CPT

## 2018-09-04 PROCEDURE — 80197 ASSAY OF TACROLIMUS: CPT | Performed by: INTERNAL MEDICINE

## 2018-09-04 PROCEDURE — 80069 RENAL FUNCTION PANEL: CPT

## 2018-09-04 PROCEDURE — 36415 COLL VENOUS BLD VENIPUNCTURE: CPT

## 2018-09-04 PROCEDURE — 82247 BILIRUBIN TOTAL: CPT

## 2018-09-04 PROCEDURE — 84460 ALANINE AMINO (ALT) (SGPT): CPT

## 2018-09-04 PROCEDURE — 84450 TRANSFERASE (AST) (SGOT): CPT

## 2018-09-04 PROCEDURE — 84155 ASSAY OF PROTEIN SERUM: CPT

## 2018-09-04 PROCEDURE — 82248 BILIRUBIN DIRECT: CPT

## 2018-09-04 PROCEDURE — 85027 COMPLETE CBC AUTOMATED: CPT

## 2018-09-05 ENCOUNTER — OFFICE VISIT (OUTPATIENT)
Dept: GASTROENTEROLOGY | Facility: CLINIC | Age: 53
End: 2018-09-05
Attending: INTERNAL MEDICINE
Payer: COMMERCIAL

## 2018-09-05 VITALS
HEART RATE: 78 BPM | RESPIRATION RATE: 16 BRPM | OXYGEN SATURATION: 100 % | TEMPERATURE: 98.9 F | DIASTOLIC BLOOD PRESSURE: 79 MMHG | SYSTOLIC BLOOD PRESSURE: 124 MMHG

## 2018-09-05 DIAGNOSIS — Z94.4 LIVER REPLACED BY TRANSPLANT (H): Primary | ICD-10-CM

## 2018-09-05 PROCEDURE — G0463 HOSPITAL OUTPT CLINIC VISIT: HCPCS | Mod: ZF

## 2018-09-05 RX ORDER — URSODIOL 500 MG/1
500 TABLET, FILM COATED ORAL 2 TIMES DAILY
Qty: 180 TABLET | Refills: 3 | Status: SHIPPED | OUTPATIENT
Start: 2018-09-05 | End: 2019-04-30

## 2018-09-05 ASSESSMENT — PAIN SCALES - GENERAL: PAINLEVEL: NO PAIN (0)

## 2018-09-05 NOTE — MR AVS SNAPSHOT
After Visit Summary   9/5/2018    Camacho Bhagat    MRN: [id number]           Patient Information     Date Of Birth          1965        Visit Information        Provider Department      9/5/2018 12:30 PM Toñito Camejo MD [facility] Hepatology        Today's Diagnoses     Liver replaced by transplant (H)    -  1       Follow-ups after your visit        Follow-up notes from your care team     Return in about 3 months (around 12/5/2018).      Your next 10 appointments already scheduled     Sep 10, 2018  1:30 PM CDT   (Arrive by 1:15 PM)   RETURN ENDOCRINE with Alisa West MD   [facility] Endocrinology ([facility])    [address]  3rd Floor  [facility] [id number] [phone]            Sep 14, 2018 12:30 PM CDT   (Arrive by 12:00 PM)   Return Visit with Cinda Cazares NP   [facility] Nephrology ([facility])    [address]  [address]  [facility] [id number] [phone]            Sep 17, 2018 10:30 AM CDT   (Arrive by 10:15 AM)   PHYSICAL with Shay Kirkpatrick MD   [facility] ([facility])    [address]  4th Floor  [facility] [id number] [phone]            Dec 05, 2018 12:45 PM CST   (Arrive by 12:30 PM)   Return Liver Transplant with Toñito Camejo MD   [facility] Hepatology ([facility])    [address]  [address]  [facility] [id number] [phone]              Who to contact     If you have questions or need follow up information about today's clinic visit or your schedule please contact [facility] HEPATOLOGY directly at [phone].  Normal or non-critical lab and imaging results will be communicated to you by MyChart, letter or phone within 4 business days after the clinic has received the results. If you do not hear from us within 7 days, please contact the clinic through MyChart or phone. If you have a critical  or abnormal lab result, we will notify you by phone as soon as possible.  Submit refill requests through [employer] or call your pharmacy and they will forward the refill request to us. Please allow 3 business days for your refill to be completed.          Additional Information About Your Visit        ResponseTekhart Information     [employer] gives you secure access to your electronic health record. If you see a primary care provider, you can also send messages to your care team and make appointments. If you have questions, please call your primary care clinic.  If you do not have a primary care provider, please call [phone] and they will assist you.        Care EveryWhere ID     This is your Care EveryWhere ID. This could be used by other organizations to access your [place] medical records  [id number]        Your Vitals Were     Pulse Temperature Respirations Pulse Oximetry          78 98.9  F (37.2  C) (Oral) 16 100%         Blood Pressure from Last 3 Encounters:   09/05/18 124/79   08/22/18 (!) 167/96   07/25/18 164/89    Weight from Last 3 Encounters:   08/22/18 101.9 kg (224 lb 9.6 oz)   07/25/18 101.2 kg (223 lb)   07/06/18 101.2 kg (223 lb)              Today, you had the following     No orders found for display         Today's Medication Changes          These changes are accurate as of 9/5/18  1:00 PM.  If you have any questions, ask your nurse or doctor.               Start taking these medicines.        Dose/Directions    ursodiol 500 MG Tabs   Used for:  Liver replaced by transplant (H)   Started by:  Toñito Camejo MD        Dose:  500 mg   Take 500 mg by mouth 2 times daily   Quantity:  180 tablet   Refills:  3         These medicines have changed or have updated prescriptions.        Dose/Directions    amLODIPine 10 MG tablet   Commonly known as:  NORVASC   This may have changed:  when to take this   Used for:  HTN (hypertension)        Dose:  10 mg   Take 1 tablet (10 mg) by mouth daily   Quantity:  90  tablet   Refills:  3       cholecalciferol 1000 UNIT tablet   Commonly known as:  vitamin D3   This may have changed:  when to take this   Used for:  Vitamin D deficiency        Dose:  1000 Units   Take 1 tablet (1,000 Units) by mouth daily   Quantity:  100 tablet   Refills:  3       cyclobenzaprine 10 MG tablet   Commonly known as:  FLEXERIL   This may have changed:  when to take this   Used for:  Immunosuppression (H)        Dose:  10 mg   Take 1 tablet (10 mg) by mouth 3 times daily as needed for muscle spasms   Quantity:  90 tablet   Refills:  0       linagliptin 5 MG Tabs tablet   Commonly known as:  TRADJENTA   This may have changed:  when to take this   Used for:  Type 2 diabetes mellitus with diabetic polyneuropathy, with long-term current use of insulin (H)        Dose:  5 mg   Take 1 tablet (5 mg) by mouth daily   Quantity:  90 tablet   Refills:  3       metoprolol succinate 50 MG 24 hr tablet   Commonly known as:  TOPROL-XL   This may have changed:  when to take this   Used for:  Benign essential hypertension, Persistent proteinuria, Hyperkalemia        Dose:  50 mg   Take 1 tablet (50 mg) by mouth daily   Quantity:  30 tablet   Refills:  11       mycophenolic acid 360 MG EC tablet   Commonly known as:  GENERIC EQUIVALENT   This may have changed:  when to take this   Used for:  History of liver transplant (H), Long-term use of immunosuppressant medication        Dose:  720 mg   Take 2 tablets (720 mg) by mouth every 12 hours   Quantity:  120 tablet   Refills:  3       predniSONE 2.5 MG tablet   Commonly known as:  DELTASONE   This may have changed:  when to take this   Used for:  Liver replaced by transplant (H), Long-term use of immunosuppressant medication        Dose:  2.5 mg   Take 1 tablet (2.5 mg) by mouth daily   Quantity:  90 tablet   Refills:  3            Where to get your medicines      These medications were sent to [facility] [address] [place] [address] [address] AT  [facility] OF [address]  [address], [place] [address]    Hours:  called pharm.  they do accept faxes Phone:  [phone]     ursodiol 500 MG Tabs                Primary Care Provider Office Phone # Fax #    Shay Kirkpatrick [phone]       [address] [place] [address] [address]  [facility] [address]        Equal Access to Services     FRANKLIN PORTILLO AH: Hadii aad ku hadasho Soomaali, waaxda luqadaha, qaybta kaalmada adeegyada, waxay idiin hayaan adeeg kharash la'jarrod ah. So [facility] [phone].    ATENCIÓN: Si habla español, tiene a zheng disposición servicios gratuitos de asistencia lingüística. Llame al [phone].    We comply with applicable federal civil rights laws and [place] laws. We do not discriminate on the basis of race, color, national origin, age, disability, sex, sexual orientation, or gender identity.            Thank you!     Thank you for choosing [facility] HEPATOLOGY  for your care. Our goal is always to provide you with excellent care. Hearing back from our patients is one way we can continue to improve our services. Please take a few minutes to complete the written survey that you may receive in the mail after your visit with us. Thank you!             Your Updated Medication List - Protect others around you: Learn how to safely use, store and throw away your medicines at www.disposemymeds.org.          This list is accurate as of 9/5/18  1:00 PM.  Always use your most recent med list.                   Brand Name Dispense Instructions for use Diagnosis    amLODIPine 10 MG tablet    NORVASC    90 tablet    Take 1 tablet (10 mg) by mouth daily    HTN (hypertension)       BLOOD GLUCOSE TEST STRIPS Strp     100 each    1 strip by Lancet route 4 times daily    Type 2 diabetes mellitus with diabetic polyneuropathy, with long-term current use of insulin (H)       cholecalciferol 1000 UNIT tablet    vitamin D3    100 tablet    Take 1 tablet (1,000 Units) by mouth daily     Vitamin D deficiency       CIALIS 5 MG tablet   Generic drug:  tadalafil      Take 5 mg by mouth as needed        cyclobenzaprine 10 MG tablet    FLEXERIL    90 tablet    Take 1 tablet (10 mg) by mouth 3 times daily as needed for muscle spasms    Immunosuppression (H)       furosemide 40 MG tablet    LASIX    60 tablet    Take 1 tablet (40 mg) by mouth 2 times daily    Hyperkalemia, Persistent proteinuria, Benign essential hypertension       gabapentin 300 MG capsule    NEURONTIN    270 capsule    TAKE ONE CAPSULE BY MOUTH THREE TIMES DAILY    Low back pain at multiple sites       insulin glargine 100 UNIT/ML injection    LANTUS    18 mL    Inject 50 Units Subcutaneous every morning    Type 2 diabetes mellitus with diabetic polyneuropathy, with long-term current use of insulin (H)       insulin pen needle 32G X 4 MM    BD ENE U/F    100 each    Use 1 pen needles daily or as directed.    Type 2 diabetes mellitus with diabetic polyneuropathy, with long-term current use of insulin (H)       linagliptin 5 MG Tabs tablet    TRADJENTA    90 tablet    Take 1 tablet (5 mg) by mouth daily    Type 2 diabetes mellitus with diabetic polyneuropathy, with long-term current use of insulin (H)       losartan 50 MG tablet    COZAAR    90 tablet    Take 1 tablet (50 mg) by mouth daily    Persistent proteinuria, Hyperkalemia, Benign essential hypertension       metoprolol succinate 50 MG 24 hr tablet    TOPROL-XL    30 tablet    Take 1 tablet (50 mg) by mouth daily    Benign essential hypertension, Persistent proteinuria, Hyperkalemia       Multi-vitamin Tabs tablet      Take 1 tablet by mouth every morning        mycophenolic acid 360 MG EC tablet    GENERIC EQUIVALENT    120 tablet    Take 2 tablets (720 mg) by mouth every 12 hours    History of liver transplant (H), Long-term use of immunosuppressant medication       omeprazole 20 MG CR capsule    priLOSEC    60 capsule    Take 1 capsule (20 mg) by mouth 2 times daily    Long-term  use of immunosuppressant medication, History of liver transplant (H)       patiromer 8.4 g packet    VELTASSA    180 each    Take 16.8 g by mouth daily    Hyperkalemia       predniSONE 2.5 MG tablet    DELTASONE    90 tablet    Take 1 tablet (2.5 mg) by mouth daily    Liver replaced by transplant (H), Long-term use of immunosuppressant medication       tacrolimus 1 MG capsule    GENERIC EQUIVALENT    330 capsule    Take 6mg (6capsules) every morning, and take 5mg (5capsules) every evening, take every 12 hours.    Liver transplant recipient (H)       ursodiol 500 MG Tabs     180 tablet    Take 500 mg by mouth 2 times daily    Liver replaced by transplant (H)

## 2018-09-05 NOTE — PROGRESS NOTES
HISTORY OF PRESENT ILLNESS:  I had the pleasure of seeing Camacho Bhagat for followup in the Liver Transplant Clinic at the [facility] on 09/05/2018.  Mr. Bhagat returns now 1-1/2 years status post liver transplantation.  His post-transplant course has been very prolonged related to posttransplant surgical complications and takedown of his ileostomy.      He is actually feeling fairly well at this visit.  He still does complain of some abdominal discomfort.  He denies any itching or skin rash.  He has improving fatigue.  He denies any increased abdominal girth or lower extremity edema.  He denies any fevers or chills, cough or shortness of breath.  He denies any nausea, vomiting, diarrhea or constipation.  His appetite has been good, and his weight has largely remained stable.  He is actually quite active also walking his dog.  There have been no other new events since he was last seen.     Current Outpatient Prescriptions   Medication     amLODIPine (NORVASC) 10 MG tablet     cholecalciferol (VITAMIN D3) 1000 UNIT tablet     CIALIS 5 MG tablet     cyclobenzaprine (FLEXERIL) 10 MG tablet     furosemide (LASIX) 40 MG tablet     gabapentin (NEURONTIN) 300 MG capsule     Glucose Blood (BLOOD GLUCOSE TEST STRIPS) STRP     insulin glargine (LANTUS) 100 UNIT/ML injection     insulin pen needle (BD ENE U/F) 32G X 4 MM     linagliptin (TRADJENTA) 5 MG TABS tablet     losartan (COZAAR) 50 MG tablet     metoprolol succinate (TOPROL-XL) 50 MG 24 hr tablet     multivitamin, therapeutic with minerals (MULTI-VITAMIN) TABS tablet     mycophenolic acid (GENERIC EQUIVALENT) 360 MG EC tablet     omeprazole (PRILOSEC) 20 MG CR capsule     patiromer (VELTASSA) 8.4 g packet     predniSONE (DELTASONE) 2.5 MG tablet     tacrolimus (GENERIC EQUIVALENT) 1 MG capsule     ursodiol 500 MG TABS     No current facility-administered medications for this visit.      B/P: 124/79, T: 98.9, P: 78, R: 16    In general he looks  quite healthy.  HEENT exam shows no scleral icterus or temporal muscle wasting.  His chest is clear.  His abdominal exam shows no increase in girth.  No masses or tenderness to palpation are present.  His liver is 10 cm in span without left lobe enlargement.  No spleen tip is palpable.  Extremity exam shows no edema.  Skin exam shows no stigmata of chronic liver disease.  Neurologic exam is nonfocal.     Recent Results (from the past 168 hour(s))   Renal panel    Collection Time: 08/31/18 11:01 AM   Result Value Ref Range    Sodium 135 133 - 144 mmol/L    Potassium 5.1 3.4 - 5.3 mmol/L    Chloride 105 94 - 109 mmol/L    Carbon Dioxide 24 20 - 32 mmol/L    Anion Gap 6 3 - 14 mmol/L    Glucose 300 (H) 70 - 99 mg/dL    Urea Nitrogen 44 (H) 7 - 30 mg/dL    Creatinine 1.47 (H) 0.66 - 1.25 mg/dL    GFR Estimate 50 (L) >60 mL/min/1.7m2    GFR Estimate If Black 60 (L) >60 mL/min/1.7m2    Calcium 8.3 (L) 8.5 - 10.1 mg/dL    Phosphorus 3.1 2.5 - 4.5 mg/dL    Albumin 3.9 3.4 - 5.0 g/dL   CBC with platelets    Collection Time: 08/31/18 11:01 AM   Result Value Ref Range    WBC 4.4 4.0 - 11.0 10e9/L    RBC Count 3.44 (L) 4.4 - 5.9 10e12/L    Hemoglobin 10.9 (L) 13.3 - 17.7 g/dL    Hematocrit 31.9 (L) 40.0 - 53.0 %    MCV 93 78 - 100 fl    MCH 31.7 26.5 - 33.0 pg    MCHC 34.2 31.5 - 36.5 g/dL    RDW 14.6 10.0 - 15.0 %    Platelet Count 72 (L) 150 - 450 10e9/L   Tacrolimus level    Collection Time: 08/31/18 11:01 AM   Result Value Ref Range    Tacrolimus Last Dose 08/30/2018 2245     Tacrolimus Level 5.7 5.0 - 15.0 ug/L   Alkaline phosphatase    Collection Time: 08/31/18 11:01 AM   Result Value Ref Range    Alkaline Phosphatase 413 (H) 40 - 150 U/L   ALT    Collection Time: 08/31/18 11:01 AM   Result Value Ref Range    ALT 91 (H) 0 - 70 U/L   AST    Collection Time: 08/31/18 11:01 AM   Result Value Ref Range    AST 65 (H) 0 - 45 U/L   Bilirubin  total    Collection Time: 08/31/18 11:01 AM   Result Value Ref Range    Bilirubin Total  1.0 0.2 - 1.3 mg/dL   Bilirubin direct    Collection Time: 08/31/18 11:01 AM   Result Value Ref Range    Bilirubin Direct 0.4 (H) 0.0 - 0.2 mg/dL   Protein total    Collection Time: 08/31/18 11:01 AM   Result Value Ref Range    Protein Total 7.0 6.8 - 8.8 g/dL   Renal panel    Collection Time: 09/04/18 11:20 AM   Result Value Ref Range    Sodium 138 133 - 144 mmol/L    Potassium 5.3 3.4 - 5.3 mmol/L    Chloride 109 94 - 109 mmol/L    Carbon Dioxide 22 20 - 32 mmol/L    Anion Gap 7 3 - 14 mmol/L    Glucose 208 (H) 70 - 99 mg/dL    Urea Nitrogen 47 (H) 7 - 30 mg/dL    Creatinine 1.47 (H) 0.66 - 1.25 mg/dL    GFR Estimate 50 (L) >60 mL/min/1.7m2    GFR Estimate If Black 60 (L) >60 mL/min/1.7m2    Calcium 8.4 (L) 8.5 - 10.1 mg/dL    Phosphorus 2.9 2.5 - 4.5 mg/dL    Albumin 3.7 3.4 - 5.0 g/dL   CBC with platelets    Collection Time: 09/04/18 11:20 AM   Result Value Ref Range    WBC 3.7 (L) 4.0 - 11.0 10e9/L    RBC Count 3.44 (L) 4.4 - 5.9 10e12/L    Hemoglobin 10.9 (L) 13.3 - 17.7 g/dL    Hematocrit 31.4 (L) 40.0 - 53.0 %    MCV 91 78 - 100 fl    MCH 31.7 26.5 - 33.0 pg    MCHC 34.7 31.5 - 36.5 g/dL    RDW 14.6 10.0 - 15.0 %    Platelet Count 79 (L) 150 - 450 10e9/L   Tacrolimus level    Collection Time: 09/04/18 11:20 AM   Result Value Ref Range    Tacrolimus Last Dose 2100 09/03/2018     Tacrolimus Level 5.4 5.0 - 15.0 ug/L   Alkaline phosphatase    Collection Time: 09/04/18 11:20 AM   Result Value Ref Range    Alkaline Phosphatase 390 (H) 40 - 150 U/L   ALT    Collection Time: 09/04/18 11:20 AM   Result Value Ref Range    ALT 78 (H) 0 - 70 U/L   AST    Collection Time: 09/04/18 11:20 AM   Result Value Ref Range    AST 61 (H) 0 - 45 U/L   Bilirubin  total    Collection Time: 09/04/18 11:20 AM   Result Value Ref Range    Bilirubin Total 0.9 0.2 - 1.3 mg/dL   Bilirubin direct    Collection Time: 09/04/18 11:20 AM   Result Value Ref Range    Bilirubin Direct 0.4 (H) 0.0 - 0.2 mg/dL   Protein total    Collection Time:  09/04/18 11:20 AM   Result Value Ref Range    Protein Total 6.8 6.8 - 8.8 g/dL      I did review his last liver biopsy as well as his last MRCP.      My impression is that Mr. Bhagat actually is doing fairly well status post liver transplantation.  His liver tests are about as good as they have been.  His kidney function has to a large degree returned to probably its new baseline, which is certainly more than adequate.  I will put him on ursodiol at a dose of 500 mg twice a day.  His liver tests are as good as they have been since the post-transplant period and, again, there is nothing on his MRCP that would account for his elevated alkaline phosphatase and modest elevations of transaminases.  His liver biopsy showed possibly some very mild acute rejection, which I doubt is operative at this point in time, and did show some centrilobular cell drop, perhaps emerging regenerative nodular hyperplasia.  For the time being, I will simply follow his liver tests, which he now can get done every other week, and I will see him back again in the clinic in 3 months.       Thank you very much for allowing me to participate in the care of this patient.  If you have any questions regarding my recommendations, please do not hesitate to contact me.       Toñito Camejo MD      Professor of Medicine  [employer]      Executive Medical Director, Solid Organ Transplant Program  [facility]

## 2018-09-05 NOTE — NURSING NOTE
Chief Complaint   Patient presents with     RECHECK     S/P Liver TX 3/4/2017     /79 (BP Location: Right arm, Patient Position: Sitting, Cuff Size: Adult Large)  Pulse 78  Temp 98.9  F (37.2  C) (Oral)  Resp 16  SpO2 100%    [facility]  9/5/2018 12:31 PM

## 2018-09-05 NOTE — LETTER
9/5/2018       RE: Camacho Bhagat  [address] [facility] [id number]     Dear Colleague,    Thank you for referring your patient, Camacho Bhagat, to the [facility]. Please see a copy of my visit note below.    HISTORY OF PRESENT ILLNESS:  I had the pleasure of seeing Camacho Bhagat for followup in the Liver Transplant Clinic at the [facility] on 09/05/2018.  Mr. Bhagat returns now 1-1/2 years status post liver transplantation.  His post-transplant course has been very prolonged related to posttransplant surgical complications and takedown of his ileostomy.      He is actually feeling fairly well at this visit.  He still does complain of some abdominal discomfort.  He denies any itching or skin rash.  He has improving fatigue.  He denies any increased abdominal girth or lower extremity edema.  He denies any fevers or chills, cough or shortness of breath.  He denies any nausea, vomiting, diarrhea or constipation.  His appetite has been good, and his weight has largely remained stable.  He is actually quite active also walking his dog.  There have been no other new events since he was last seen.     Current Outpatient Prescriptions   Medication     amLODIPine (NORVASC) 10 MG tablet     cholecalciferol (VITAMIN D3) 1000 UNIT tablet     CIALIS 5 MG tablet     cyclobenzaprine (FLEXERIL) 10 MG tablet     furosemide (LASIX) 40 MG tablet     gabapentin (NEURONTIN) 300 MG capsule     Glucose Blood (BLOOD GLUCOSE TEST STRIPS) STRP     insulin glargine (LANTUS) 100 UNIT/ML injection     insulin pen needle (BD ENE U/F) 32G X 4 MM     linagliptin (TRADJENTA) 5 MG TABS tablet     losartan (COZAAR) 50 MG tablet     metoprolol succinate (TOPROL-XL) 50 MG 24 hr tablet     multivitamin, therapeutic with minerals (MULTI-VITAMIN) TABS tablet     mycophenolic acid (GENERIC EQUIVALENT) 360 MG EC tablet     omeprazole (PRILOSEC) 20 MG CR capsule      patiromer (VELTASSA) 8.4 g packet     predniSONE (DELTASONE) 2.5 MG tablet     tacrolimus (GENERIC EQUIVALENT) 1 MG capsule     ursodiol 500 MG TABS     No current facility-administered medications for this visit.      B/P: 124/79, T: 98.9, P: 78, R: 16    In general he looks quite healthy.  HEENT exam shows no scleral icterus or temporal muscle wasting.  His chest is clear.  His abdominal exam shows no increase in girth.  No masses or tenderness to palpation are present.  His liver is 10 cm in span without left lobe enlargement.  No spleen tip is palpable.  Extremity exam shows no edema.  Skin exam shows no stigmata of chronic liver disease.  Neurologic exam is nonfocal.     Recent Results (from the past 168 hour(s))   Renal panel    Collection Time: 08/31/18 11:01 AM   Result Value Ref Range    Sodium 135 133 - 144 mmol/L    Potassium 5.1 3.4 - 5.3 mmol/L    Chloride 105 94 - 109 mmol/L    Carbon Dioxide 24 20 - 32 mmol/L    Anion Gap 6 3 - 14 mmol/L    Glucose 300 (H) 70 - 99 mg/dL    Urea Nitrogen 44 (H) 7 - 30 mg/dL    Creatinine 1.47 (H) 0.66 - 1.25 mg/dL    GFR Estimate 50 (L) >60 mL/min/1.7m2    GFR Estimate If Black 60 (L) >60 mL/min/1.7m2    Calcium 8.3 (L) 8.5 - 10.1 mg/dL    Phosphorus 3.1 2.5 - 4.5 mg/dL    Albumin 3.9 3.4 - 5.0 g/dL   CBC with platelets    Collection Time: 08/31/18 11:01 AM   Result Value Ref Range    WBC 4.4 4.0 - 11.0 10e9/L    RBC Count 3.44 (L) 4.4 - 5.9 10e12/L    Hemoglobin 10.9 (L) 13.3 - 17.7 g/dL    Hematocrit 31.9 (L) 40.0 - 53.0 %    MCV 93 78 - 100 fl    MCH 31.7 26.5 - 33.0 pg    MCHC 34.2 31.5 - 36.5 g/dL    RDW 14.6 10.0 - 15.0 %    Platelet Count 72 (L) 150 - 450 10e9/L   Tacrolimus level    Collection Time: 08/31/18 11:01 AM   Result Value Ref Range    Tacrolimus Last Dose 08/30/2018 2245     Tacrolimus Level 5.7 5.0 - 15.0 ug/L   Alkaline phosphatase    Collection Time: 08/31/18 11:01 AM   Result Value Ref Range    Alkaline Phosphatase 413 (H) 40 - 150 U/L   ALT     Collection Time: 08/31/18 11:01 AM   Result Value Ref Range    ALT 91 (H) 0 - 70 U/L   AST    Collection Time: 08/31/18 11:01 AM   Result Value Ref Range    AST 65 (H) 0 - 45 U/L   Bilirubin  total    Collection Time: 08/31/18 11:01 AM   Result Value Ref Range    Bilirubin Total 1.0 0.2 - 1.3 mg/dL   Bilirubin direct    Collection Time: 08/31/18 11:01 AM   Result Value Ref Range    Bilirubin Direct 0.4 (H) 0.0 - 0.2 mg/dL   Protein total    Collection Time: 08/31/18 11:01 AM   Result Value Ref Range    Protein Total 7.0 6.8 - 8.8 g/dL   Renal panel    Collection Time: 09/04/18 11:20 AM   Result Value Ref Range    Sodium 138 133 - 144 mmol/L    Potassium 5.3 3.4 - 5.3 mmol/L    Chloride 109 94 - 109 mmol/L    Carbon Dioxide 22 20 - 32 mmol/L    Anion Gap 7 3 - 14 mmol/L    Glucose 208 (H) 70 - 99 mg/dL    Urea Nitrogen 47 (H) 7 - 30 mg/dL    Creatinine 1.47 (H) 0.66 - 1.25 mg/dL    GFR Estimate 50 (L) >60 mL/min/1.7m2    GFR Estimate If Black 60 (L) >60 mL/min/1.7m2    Calcium 8.4 (L) 8.5 - 10.1 mg/dL    Phosphorus 2.9 2.5 - 4.5 mg/dL    Albumin 3.7 3.4 - 5.0 g/dL   CBC with platelets    Collection Time: 09/04/18 11:20 AM   Result Value Ref Range    WBC 3.7 (L) 4.0 - 11.0 10e9/L    RBC Count 3.44 (L) 4.4 - 5.9 10e12/L    Hemoglobin 10.9 (L) 13.3 - 17.7 g/dL    Hematocrit 31.4 (L) 40.0 - 53.0 %    MCV 91 78 - 100 fl    MCH 31.7 26.5 - 33.0 pg    MCHC 34.7 31.5 - 36.5 g/dL    RDW 14.6 10.0 - 15.0 %    Platelet Count 79 (L) 150 - 450 10e9/L   Tacrolimus level    Collection Time: 09/04/18 11:20 AM   Result Value Ref Range    Tacrolimus Last Dose 2100 09/03/2018     Tacrolimus Level 5.4 5.0 - 15.0 ug/L   Alkaline phosphatase    Collection Time: 09/04/18 11:20 AM   Result Value Ref Range    Alkaline Phosphatase 390 (H) 40 - 150 U/L   ALT    Collection Time: 09/04/18 11:20 AM   Result Value Ref Range    ALT 78 (H) 0 - 70 U/L   AST    Collection Time: 09/04/18 11:20 AM   Result Value Ref Range    AST 61 (H) 0 - 45 U/L    Bilirubin  total    Collection Time: 09/04/18 11:20 AM   Result Value Ref Range    Bilirubin Total 0.9 0.2 - 1.3 mg/dL   Bilirubin direct    Collection Time: 09/04/18 11:20 AM   Result Value Ref Range    Bilirubin Direct 0.4 (H) 0.0 - 0.2 mg/dL   Protein total    Collection Time: 09/04/18 11:20 AM   Result Value Ref Range    Protein Total 6.8 6.8 - 8.8 g/dL      I did review his last liver biopsy as well as his last MRCP.      My impression is that Mr. Bhagat actually is doing fairly well status post liver transplantation.  His liver tests are about as good as they have been.  His kidney function has to a large degree returned to probably its new baseline, which is certainly more than adequate.  I will put him on ursodiol at a dose of 500 mg twice a day.  His liver tests are as good as they have been since the post-transplant period and, again, there is nothing on his MRCP that would account for his elevated alkaline phosphatase and modest elevations of transaminases.  His liver biopsy showed possibly some very mild acute rejection, which I doubt is operative at this point in time, and did show some centrilobular cell drop, perhaps emerging regenerative nodular hyperplasia.  For the time being, I will simply follow his liver tests, which he now can get done every other week, and I will see him back again in the clinic in 3 months.       Thank you very much for allowing me to participate in the care of this patient.  If you have any questions regarding my recommendations, please do not hesitate to contact me.       Toñito Camejo MD      Professor of Medicine  [employer]      Executive Medical Director, Solid Organ Transplant Program  [facility]

## 2018-09-06 DIAGNOSIS — Z94.4 LIVER REPLACED BY TRANSPLANT (H): Primary | ICD-10-CM

## 2018-09-07 NOTE — NURSING NOTE
Here for post liver transplant follow-up.    Reviewed recent labs, stable.    Complaints:  Concerns that liver labs are not normal, or back to baseline.      Current immunosuppression:    Tacrolimus 6mg in am, 5mg in pm, Q 12 hours,  Prednisone 2.5mg Q day, 720mg myfortic Q 12 hours.    Med changes: started on ursodial BID    Lab frequency:  Discussed with pt to decrease labs to every 2 weeks.      Follow-up:  Hepatology  RTC  3 months to see

## 2018-09-10 ENCOUNTER — OFFICE VISIT (OUTPATIENT)
Dept: ENDOCRINOLOGY | Facility: CLINIC | Age: 53
End: 2018-09-10
Payer: COMMERCIAL

## 2018-09-10 VITALS
HEART RATE: 87 BPM | WEIGHT: 225.9 LBS | SYSTOLIC BLOOD PRESSURE: 153 MMHG | BODY MASS INDEX: 30.64 KG/M2 | DIASTOLIC BLOOD PRESSURE: 90 MMHG

## 2018-09-10 DIAGNOSIS — E11.21 TYPE 2 DIABETES MELLITUS WITH DIABETIC NEPHROPATHY, WITH LONG-TERM CURRENT USE OF INSULIN (H): Primary | ICD-10-CM

## 2018-09-10 DIAGNOSIS — E11.9 TYPE 2 DIABETES MELLITUS NOT AT GOAL (H): Primary | ICD-10-CM

## 2018-09-10 DIAGNOSIS — Z79.4 TYPE 2 DIABETES MELLITUS WITH DIABETIC NEPHROPATHY, WITH LONG-TERM CURRENT USE OF INSULIN (H): Primary | ICD-10-CM

## 2018-09-10 RX ORDER — INSULIN ASPART 100 [IU]/ML
INJECTION, SOLUTION INTRAVENOUS; SUBCUTANEOUS
Qty: 42 ML | Refills: 3 | Status: SHIPPED | OUTPATIENT
Start: 2018-09-10 | End: 2018-11-19

## 2018-09-10 ASSESSMENT — ENCOUNTER SYMPTOMS
MUSCLE CRAMPS: 1
NECK PAIN: 1
ARTHRALGIAS: 1
JOINT SWELLING: 1
STIFFNESS: 1
MUSCLE WEAKNESS: 0
MYALGIAS: 0
BACK PAIN: 1

## 2018-09-10 ASSESSMENT — PAIN SCALES - GENERAL: PAINLEVEL: NO PAIN (0)

## 2018-09-10 NOTE — LETTER
9/10/2018       RE: Camacho Bhagat  [address] [facility] [id number]     Dear Colleague,    Thank you for referring your patient, Camacho Bhagat, to the [facility] ENDOCRINOLOGY at [facility] Please see a copy of my visit note below.      HPI: Camacho Bhagat is a 52 year old year old male who is here for a clinic visit.   he  has a past medical history of Depressive disorder, not elsewhere classified; Diabetes type 2, controlled (H) (11/10/2016); Esophageal reflux; Fibromyalgia (1/2009); Gangrene of finger (H) (8/25/2017); H/O deep venous thrombosis (11/2001); H/O CASTAÑEDA (nonalcoholic steatohepatitis); H/O Pneumonia, organism unspecified(486) (10/2001); H/O: HTN (hypertension) (11/2001); History of CVA (cerebrovascular accident); History of hepatocellular carcinoma; History of liver transplant (H); History of obstructive sleep apnea; HLD (hyperlipidemia); Ischemia of both lower extremities (8/25/2017); Liver transplant rejection (H) (6/11/2018); Neutropenic colitis (H) (7/4/2017); Osteoarthritis; Presence of PERMANENT IVC filter; and Rheumatoid arthritis(714.0).    History of Diabetes  Type 2   This was diagnosed in 2002.  Oral meds first, later placed on insulin for the last 6-7 years.     Related history: h/o CASTAÑEDA cirrohcis +/- work exposures, and HCC s/p liver transplant 3/2017. Course complicated by acute abdomen/neutropenic fever requiring right hemicolectomy and colostomy Fall 2017. He later underwent colostomy takedown 1/5/18 which was complicated by abdominal wall abscess at a drain site. During this course, he has also developed excessive proteinuria which is followed by nephrology.     Current DM Regimen:  -lantus 50u qam  -trajdenta 5mg in AM    Related meds:  Prednisone started for transplant 2.5mg per day. Started spring 2018 as tacro had to be decreased due to SE of hyperkalemia.  Since his last visit here, the dose of tacrolimus was gradually increased and the  dose was maxed out 3 weeks ago.  Subsequently, the patient has noticed higher blood sugar numbers.    The glucometer readings reveal that he checks his blood glucose twice daily.  Most of the times, he checks prior to eating, occasionally within 1 hour after eating.  In the last weeks, he has been using a NovoLog flex pain which he has left from a prior hospital admission from last winter, and takes 1 unit per 25 mg above 200 at bedtime.  He reports being taught carbohydrate counting in the past but not formally (he used to work as a nurse at [employer] [place]  On the meter, the average blood glucose is 233 with a standard deviation of 81 and a range variable between 120 and 374.  Fasting blood sugar is quite variable, from 127-302.  On average, he has 2 meals a day, breakfast (anywhere from 7 to 10 AM) and dinner (around 6 to 7:30 PM).  The snacks are rare, at variable times.  Denies experiencing hypoglycemic symptoms.    Current Treatment  Diabetes Medication(s)     Dipeptidyl Peptidase-4 (DPP-4) Inhibitors Sig    linagliptin (TRADJENTA) 5 MG TABS tablet Take 1 tablet (5 mg) by mouth daily     Patient taking differently:  Take 5 mg by mouth every morning     Insulin Sig    insulin glargine (LANTUS) 100 UNIT/ML injection Inject 50 Units Subcutaneous every morning    NOVOLOG FLEXPEN 100 UNIT/ML soln Administer 1 U Novolog per 10 grams CHO for all meals and snacks. Total daily insulin dose up to 45 U.        Diet: 2 meals per day  Breakfast: varies, example - english muffin, sausage egg/cheese buscuit   Lunch: skips   Dinner: rarely eats out,cooks at home, example - baked chicken, veggies, rice  Snacks: none  Alcohol or sugared beverages: none.     Exercise   Walks dog every day, 1-2 miles    Complications    + chronic complications.     1. Retinopathy: No known eye disease  Last eye exam was 8/2018.  H/o DR. He has cataracts.      2.  Nephropathy: yes. He has significant proteinuria and is scheduled to be seen by nephrology  this Friday.  Recent GFR in the 50s.  He was started on losartan on 18 and the dose was increased to 50 mg daily.    Albumin Urine mg/g Cr   Date Value Ref Range Status   10/27/2017 132.46 (H) 0 - 17 mg/g Cr Final     Medication (Note: This includes the hypertensive combination subclass to make sure to show all ACEI/ARB's.)     Angiotensin II Receptor Antagonists Sig    losartan (COZAAR) 50 MG tablet Take 1 tablet (50 mg) by mouth daily          3. Neuropathy - left foot - pins/needles sensation.   Last monofilament testin/18    5. Macrovascular: none     6. Lipids: not on statin , last LDL in 30s    Past Medical History:   Diagnosis Date     Depressive disorder, not elsewhere classified      Diabetes type 2, controlled (H) 11/10/2016     Esophageal reflux      Fibromyalgia 2009    dx with Dr Benitez( Rheum)     Gangrene of finger (H) 2017     H/O deep venous thrombosis 2001    Permanent IVC filter in place.     H/O CASTAÑEDA (nonalcoholic steatohepatitis)      H/O Pneumonia, organism unspecified(486) 10/2001    Included ARDS, sepsis, and  acute renal failure; hospitalized, required tracheostomy placement.     H/O: HTN (hypertension) 2001    No longer prescribed antihypertensive medication.       History of CVA (cerebrovascular accident)      History of hepatocellular carcinoma      History of liver transplant (H)      History of obstructive sleep apnea     No longer wears CPAP since losing approximately 200 pounds with his liver transplant and its complications.       HLD (hyperlipidemia)      Ischemia of both lower extremities 2017    Distal ischemia due to shock/high pressor requirements     Liver transplant rejection (H) 2018     Neutropenic colitis (H) 2017     Osteoarthritis      Presence of PERMANENT IVC filter      Rheumatoid arthritis(714.0)    DEXA scan normal prior to transplant, in     Past Surgical History:   Procedure Laterality Date     BENCH LIVER N/A 3/4/2017     Procedure: BENCH LIVER;  Surgeon: Jovan Tran MD;  Location: [facility] OR     C TOTAL KNEE ARTHROPLASTY  2008    Right knee arthroscopy     CHOLECYSTECTOMY       COLONOSCOPY N/A 7/21/2017    Procedure: COMBINED COLONOSCOPY, SINGLE OR MULTIPLE BIOPSY/POLYPECTOMY BY BIOPSY;  Colonoscopy;  Surgeon: Izaiah Montes MD;  Location: [facility] GI     ENDOSCOPIC RETROGRADE CHOLANGIOPANCREATOGRAM N/A 5/22/2018    Procedure: COMBINED ENDOSCOPIC RETROGRADE CHOLANGIOPANCREATOGRAPHY, PLACE TUBE/STENT;  Endoscopic Retrograde Cholangiopancreatography with sphincterotomy and pancreatic duct stent placement;  Surgeon: Zay Gaitan MD;  Location: [facility] OR     ENT SURGERY      Repair of deviated septum     ESOPHAGOSCOPY, GASTROSCOPY, DUODENOSCOPY (EGD), COMBINED N/A 8/4/2016    Procedure: COMBINED ESOPHAGOSCOPY, GASTROSCOPY, DUODENOSCOPY (EGD), BIOPSY SINGLE OR MULTIPLE;  Surgeon: Trent Pederson MD;  Location:  GI     ESOPHAGOSCOPY, GASTROSCOPY, DUODENOSCOPY (EGD), COMBINED N/A 8/27/2017    Procedure: COMBINED ESOPHAGOSCOPY, GASTROSCOPY, DUODENOSCOPY (EGD);;  Surgeon: Los Wynn MD;  Location: [facility]     INCISION AND DRAINAGE ABDOMEN WASHOUT, COMBINED Right 2/14/2018    Procedure: COMBINED INCISION AND DRAINAGE ABDOMEN WASHOUT;  COMBINED INCISION AND DRAINAGE right ABDOMEN flank;  Surgeon: Sara Dinh MD;  [facility]: [facility]     LAPAROTOMY EXPLORATORY N/A 7/4/2017    Procedure: LAPAROTOMY EXPLORATORY;  Exploratory Laparotomy, washout;  Surgeon: Tip Zhang MD;  [facility]     LAPAROTOMY EXPLORATORY N/A 7/5/2017    Procedure: LAPAROTOMY EXPLORATORY;  Exploratory Laparotomy, Washout with closure.;  Surgeon: Tip Zhang MD;  [facility]     LAPAROTOMY EXPLORATORY N/A 7/26/2017    Procedure: LAPAROTOMY EXPLORATORY;  Exploratory Laparotomy, Right angelique-colectomy, end ileostomy, mucosal fistula, partial omentectomy;  Surgeon: Sara Dinh MD;  [facility]: [facility]      ORTHOPEDIC SURGERY Right     Repair of dislocated wrist.       RELEASE TRIGGER FINGER Right 11/10/2017    Procedure: RELEASE TRIGGER FINGER;  Debride, V-Y Flap Right Index Finger;  Surgeon: Momo Noonan MD;  [facility]     TAKEDOWN ILEOSTOMY N/A 2018    Procedure: TAKEDOWN ILEOSTOMY;  Exploratory Laparotomy, Lysis of Adhesions, Takedown Of End Ileostomy, Takedown of mucocutaneous fistula, ileocolic resection  And Colorectal Anastomosis, Primary repair of Ventral Hernia, Anesthesia Block ;  Surgeon: Sara Dinh MD;  [facility]: [facility]     TRACHEOSTOMY      During hospitalization for pneumonia.       TRANSPLANT LIVER RECIPIENT  DONOR N/A 3/4/2017    Procedure: TRANSPLANT LIVER RECIPIENT  DONOR;  Surgeon: Jovan Tran MD;  Location: [facility]           Review of Systems     Constitutional: weight up 20 lbs since . Appetite is good   Head: no headache   Eye: no vision change/ loss of peripheral vision.   ENT: No throat congestion.   Respiratory: no cough   Cardiovascular: no chest pain or tachycardia  Gastrointestinal: Negative for vomiting, abdominal pain and diarrhea.  Genitourinary: No bladder problems.  Musculoskeletal: Negative for myalgias. No weakness. Diffuse joint pain - mainly elbows and knees.   Neurological: Negative for seizures and headaches. Muscle cramps - mainly hands.   Psychiatric/Behavioral: Negative for behavioral problem and dysphoric mood.    Past Medical History:   Diagnosis Date     Depressive disorder, not elsewhere classified      Diabetes type 2, controlled (H) 11/10/2016     Esophageal reflux      Fibromyalgia 2009    dx with Dr Benitez( Rheum)     Gangrene of finger (H) 2017     H/O deep venous thrombosis 2001    Permanent IVC filter in place.     H/O CASTAÑEDA (nonalcoholic steatohepatitis)      H/O Pneumonia, organism unspecified(486) 10/2001    Included ARDS, sepsis, and  acute renal failure; hospitalized, required  tracheostomy placement.     H/O: HTN (hypertension) 11/2001    No longer prescribed antihypertensive medication.       History of CVA (cerebrovascular accident)      History of hepatocellular carcinoma      History of liver transplant (H)      History of obstructive sleep apnea     No longer wears CPAP since losing approximately 200 pounds with his liver transplant and its complications.       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     Osteoarthritis      Presence of PERMANENT IVC filter      Rheumatoid arthritis(714.0)        Past Surgical History:   Procedure Laterality Date     BENCH LIVER N/A 3/4/2017    Procedure: BENCH LIVER;  Surgeon: Jovan Tran MD;  Location:  OR      TOTAL KNEE ARTHROPLASTY  2008    Right knee arthroscopy     CHOLECYSTECTOMY       COLONOSCOPY N/A 7/21/2017    Procedure: COMBINED COLONOSCOPY, SINGLE OR MULTIPLE BIOPSY/POLYPECTOMY BY BIOPSY;  Colonoscopy;  Surgeon: Izaiah Montes MD;  Location:  GI     ENDOSCOPIC RETROGRADE CHOLANGIOPANCREATOGRAM N/A 5/22/2018    Procedure: COMBINED ENDOSCOPIC RETROGRADE CHOLANGIOPANCREATOGRAPHY, PLACE TUBE/STENT;  Endoscopic Retrograde Cholangiopancreatography with sphincterotomy and pancreatic duct stent placement;  Surgeon: Zay Gaitan MD;  [facility]     ENT SURGERY      Repair of deviated septum     ESOPHAGOSCOPY, GASTROSCOPY, DUODENOSCOPY (EGD), COMBINED N/A 8/4/2016    Procedure: COMBINED ESOPHAGOSCOPY, GASTROSCOPY, DUODENOSCOPY (EGD), BIOPSY SINGLE OR MULTIPLE;  Surgeon: Trent Pederson MD;  Location:  GI     ESOPHAGOSCOPY, GASTROSCOPY, DUODENOSCOPY (EGD), COMBINED N/A 8/27/2017    Procedure: COMBINED ESOPHAGOSCOPY, GASTROSCOPY, DUODENOSCOPY (EGD);;  Surgeon: Los Wynn MD;  Location:  GI     INCISION AND DRAINAGE ABDOMEN  WASHOUT, COMBINED Right 2018    Procedure: COMBINED INCISION AND DRAINAGE ABDOMEN WASHOUT;  COMBINED INCISION AND DRAINAGE right ABDOMEN flank;  Surgeon: Sara Dinh MD;  [facility]     LAPAROTOMY EXPLORATORY N/A 2017    Procedure: LAPAROTOMY EXPLORATORY;  Exploratory Laparotomy, washout;  Surgeon: Tip Zhang MD;  [facility]     LAPAROTOMY EXPLORATORY N/A 2017    Procedure: LAPAROTOMY EXPLORATORY;  Exploratory Laparotomy, Washout with closure.;  Surgeon: Tip Zhang MD;  [facility]     LAPAROTOMY EXPLORATORY N/A 2017    Procedure: LAPAROTOMY EXPLORATORY;  Exploratory Laparotomy, Right angelique-colectomy, end ileostomy, mucosal fistula, partial omentectomy;  Surgeon: Sara Dinh MD;  [facility]: [facility]     ORTHOPEDIC SURGERY Right     Repair of dislocated wrist.       RELEASE TRIGGER FINGER Right 11/10/2017    Procedure: RELEASE TRIGGER FINGER;  Debride, V-Y Flap Right Index Finger;  Surgeon: Momo Noonan MD;  [facility]     TAKEDOWN ILEOSTOMY N/A 2018    Procedure: TAKEDOWN ILEOSTOMY;  Exploratory Laparotomy, Lysis of Adhesions, Takedown Of End Ileostomy, Takedown of mucocutaneous fistula, ileocolic resection  And Colorectal Anastomosis, Primary repair of Ventral Hernia, Anesthesia Block ;  Surgeon: Sara Dnih MD;  [facility]     TRACHEOSTOMY  2001    During hospitalization for pneumonia.       TRANSPLANT LIVER RECIPIENT  DONOR N/A 3/4/2017    Procedure: TRANSPLANT LIVER RECIPIENT  DONOR;  Surgeon: Jovan Tran MD;  [facility]       Family History   Problem Relation Age of Onset     Diabetes Father      Hypertension Father      Substance Abuse Father      Arthritis Mother      Thyroid Cancer Mother      Survivor!     Cervical Cancer Maternal Grandmother      Cerebrovascular Disease Maternal Grandfather      No Known Problems Paternal Grandmother      Prostate Cancer  Paternal Grandfather      Colon Cancer No family hx of      Hyperlipidemia No family hx of      Coronary Artery Disease No family hx of      Breast Cancer No family hx of          Social History     Social History     Marital status: ,      Spouse name: N/A     Number of children: 1     Years of education: N/A     Occupational History            Social History Main Topics     Smoking status: Former Smoker     Packs/day: 0.75     Years: 2.00     Quit date: 1988     Smokeless tobacco: Former User     Types: Chew     Quit date: 10/8/2015     Alcohol use No      Comment: No alcohol since liver transplant.       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, not currently working         Lives alone    Objective:   /90  Pulse 87  Wt 102.5 kg (225 lb 14.4 oz)  BMI 30.64 kg/m2  Wt Readings from Last 10 Encounters:   09/10/18 102.5 kg (225 lb 14.4 oz)   08/22/18 101.9 kg (224 lb 9.6 oz)   07/25/18 101.2 kg (223 lb)   07/06/18 101.2 kg (223 lb)   06/06/18 100.6 kg (221 lb 11.2 oz)   05/22/18 98.1 kg (216 lb 4.3 oz)   05/18/18 98.5 kg (217 lb 1.6 oz)   04/11/18 93 kg (205 lb)   04/04/18 94.8 kg (209 lb)   03/28/18 92.1 kg (203 lb)     General: well developed, well nourished, in NAD    In House Labs:   Lab Results   Component Value Date    A1C 5.1 01/06/2018    A1C  07/06/2017     Canceled, Test credited   Below Assay Range  NOTIFIED LEONARD ONEILL AT 0538 ON 7/6/17 BY CHRISSY      A1C 9.4 02/16/2017    A1C 6.2 12/14/2016    A1C 6.1 10/27/2016       TSH   Date Value Ref Range Status   04/02/2018 2.74 0.40 - 4.00 mU/L Final   02/08/2016 3.35 0.40 - 4.00 mU/L Final   04/11/2011 2.77 0.4 - 5.0 mU/L Final   02/15/2010 2.53 0.4 - 5.0 mU/L Final   07/31/2009 1.95 0.4 - 5.0 mU/L Final     T4 Free   Date Value Ref Range Status    04/11/2011 1.23 0.70 - 1.85 ng/dL Final   02/15/2010 0.90 0.70 - 1.85 ng/dL Final       Creatinine   Date Value Ref Range Status   09/04/2018 1.47 (H) 0.66 - 1.25 mg/dL Final   ]    Recent Labs   Lab Test  04/02/18   1042  02/26/18   1111   07/03/12   0710   CHOL  97  117   < >  133   HDL  36*  30*   < >  35*   LDL  39  29   < >  62   TRIG  109  293*   < >  182*   CHOLHDLRATIO   --    --    --   3.8    < > = values in this interval not displayed.         Assessment/Treatment Plan:      Camacho Bhagat is a 52 year old male with    1. Type 2 Diabetes: A1c less reliable in the context of anemia.    Recently, the glycemic control deteriorated in the context of increased dose of tacrolimus.   Recommendations:  Check blood glucose before meals and at bedtime  Start NovoLog at 1 unit per 10 g carbohydrates for all meals and snacks, with the exception of the meals and snacks followed by physical activity or exercise  Use the correction scale of 1 unit per 25 mg above 150 for the pre-meal blood sugar and above 200 for the bedtime blood sugar  Schedule an appointment with the dietitian to review in detail carbohydrate counting  Keep written records of the blood sugar, carbohydrate intake and insulin dose administered and bring them to the appointment with the dietitian  Discontinue Tradjenta  As the blood sugar numbers during daytime improve, he might need to decrease the dose of Lantus to 45 units    I counseled the patient on the pharmacokinetics of short acting insulin, the correct administration of this medication (10-15 minutes prior to food intake), the use of the correction scale.     2. DM complications  Retinopathy - due for eye exam, referral placed  Nephropathy - + proteinuria, managed by nephrology given complicated case, starting on losartan  Neuropathy - + sx of parasthesias, + h/o gangrene associated with critical illness and pressor use, requried wound debridement but now healed. Normal foot exam 04/11/18    BP - elevated today, managed by nephrology and he has a scheduled follow-up appointment this week.  CVD/Lipids - not on statin but LDL in 30s, will continue to monitor.    Test and/or medications prescribed today:  Orders Placed This Encounter   Procedures     NUTRITION REFERRAL     Answers for HPI/ROS submitted by the patient on 8/27/2018   General Symptoms: No  Skin Symptoms: No  HENT Symptoms: No  EYE SYMPTOMS: No  HEART SYMPTOMS: No  LUNG SYMPTOMS: No  INTESTINAL SYMPTOMS: No  URINARY SYMPTOMS: No  REPRODUCTIVE SYMPTOMS: Yes  SKELETAL SYMPTOMS: Yes  BLOOD SYMPTOMS: No  NERVOUS SYSTEM SYMPTOMS: No  Back pain: No  Muscle aches: No  Neck pain: No  Swollen joints: No  Joint pain: Yes  Bone pain: No  Muscle cramps: Yes  Muscle weakness: No  Joint stiffness: No  Bone fracture: No  Scrotal pain or swelling: No  Erectile dysfunction: Yes  Penile discharge: No  Genital ulcers: No  Reduced libido: No    Total visit time 25 min/counceling and coordination of care 20 min.      Alisa West MD

## 2018-09-10 NOTE — MR AVS SNAPSHOT
After Visit Summary   9/10/2018    Camacho Bhagat    MRN: [id number]           Patient Information     Date Of Birth          1965        Visit Information        Provider Department      9/10/2018 1:30 PM Alisa West MD  [facility]        Today's Diagnoses     Type 2 diabetes mellitus with diabetic nephropathy, with long-term current use of insulin (H)    -  1      Care Instructions    Check BG before meals and at bedtime   Administer 1 U Novolog per 10 grams CHO for all meals and snacks, with the exception of the meals or snacks followed by physical exercise or activity   Use the following correction scale for the premeal B U Novolog per 25 points above 150:    150 - 275 - 1 U   276 - 300 - 2 U   201 - 325 - 3 U   326 - 350 - 4 U   351 - 375 - 5 U   ...    Use the following correction scale for the bedtime B U Novolog per 25 points above 200:     200 - 225 - 1 U  225 - 250 - 2 U   251 - 275 - 3 U   275 - 300 - 4 U   301 - 325 - 5 U   326 - 350 - 6 U       Bring to the apt with the dietician the records of the CHO intake and insulin administered   Discontinue Trajenta   As the BG numbers during daytime improve, you might need to decrease the dose of Lantus to 45 U           Follow-ups after your visit        Additional Services     NUTRITION REFERRAL       Your provider has referred you to: [facility]: [facility] (on call location)  - [place] [phone]   [email or web address]    Please be aware that coverage of these services is subject to the terms and limitations of your health insurance plan.  Call member services at your health plan with any benefit or coverage questions.      Please bring the following to your appointment:      >>   This referral request   >>   Any documents given to you for this referral  >>   Any specific questions you have about diet or food choices                  Follow-up notes from your care  team     Return in about 2 months (around 11/10/2018) for dietician apt.      Your next 10 appointments already scheduled     Sep 14, 2018 12:30 PM CDT   (Arrive by 12:00 PM)   Return Visit with Cinda Cazares NP   [facility] ([facility])    [address]  [address]  [facility] [id number] [phone]            Sep 17, 2018 10:30 AM CDT   (Arrive by 10:15 AM)   PHYSICAL with Shay Kirkpatrick MD   [facility] ([facility])    [address] [place] [address]  4th Floor  [facility] [id number] [phone]            Sep 19, 2018 12:30 PM CDT   (Arrive by 12:15 PM)   Diabetes Education with Juanita Peraza RD   [facility] ([facility])    [address] [place] [address]  [address]rd [facility] [id number] [phone]           Please bring to your appointment: 1) 2 - 3 day food journal. Write down everything you eat and drink for 2 - 3 days prior to your appointment. 2) Your insurance card. 3) A blood glucose meter. If you do not have one, contact your pharmacy or clinic prior to your appointment. Your pharmacist can assist you with choosing a meter suitable to your insurance needs. 4)  A list of your medications, including prescription, over the counter and herbal. Include dosage and frequency where appropriate.            Nov 19, 2018  9:30 AM CST   (Arrive by 9:15 AM)   RETURN ENDOCRINE with Alisa West MD   [facility] ([facility])    [address] [place] [address]  [address] [facility] [id number] [phone]            Dec 05, 2018 12:45 PM CST   (Arrive by 12:30 PM)   Return Liver Transplant with Toñito Camejo MD   [facility] ([facility])    [address] [place] [address]  [address]  [facility] [id number] [phone]              Who to contact     Please call your clinic at [phone] to:    Ask questions about your  health    Make or cancel appointments    Discuss your medicines    Learn about your test results    Speak to your doctor            Additional Information About Your Visit        [employer] Information     [employer] gives you secure access to your electronic health record. If you see a primary care provider, you can also send messages to your care team and make appointments. If you have questions, please call your primary care clinic.  If you do not have a primary care provider, please call [phone] and they will assist you.      [employer] is an electronic gateway that provides easy, online access to your medical records. With [employer], you can request a clinic appointment, read your test results, renew a prescription or communicate with your care team.     To access your existing account, please contact your [facility] or call [phone] for assistance.        Care EveryWhere ID     This is your Care EveryWhere ID. This could be used by other organizations to access your [facility] medical records  [id number]        Your Vitals Were     Pulse BMI (Body Mass Index)                87 30.64 kg/m2           Blood Pressure from Last 3 Encounters:   09/10/18 153/90   09/05/18 124/79   08/22/18 (!) 167/96    Weight from Last 3 Encounters:   09/10/18 102.5 kg (225 lb 14.4 oz)   08/22/18 101.9 kg (224 lb 9.6 oz)   07/25/18 101.2 kg (223 lb)              We Performed the Following     NUTRITION REFERRAL          Today's Medication Changes          These changes are accurate as of 9/10/18  2:33 PM.  If you have any questions, ask your nurse or doctor.               Start taking these medicines.        Dose/Directions    NovoLOG FLEXPEN 100 UNIT/ML injection   Used for:  Type 2 diabetes mellitus with diabetic nephropathy, with long-term current use of insulin (H)   Generic drug:  insulin aspart   Started by:  Alisa West MD        Administer 1 U Novolog per 10 grams CHO for all meals  and snacks. Total daily insulin dose up to 45 U.   Quantity:  42 mL   Refills:  3         These medicines have changed or have updated prescriptions.        Dose/Directions    amLODIPine 10 MG tablet   Commonly known as:  NORVASC   This may have changed:  when to take this   Used for:  HTN (hypertension)        Dose:  10 mg   Take 1 tablet (10 mg) by mouth daily   Quantity:  90 tablet   Refills:  3       cholecalciferol 1000 UNIT tablet   Commonly known as:  vitamin D3   This may have changed:  when to take this   Used for:  Vitamin D deficiency        Dose:  1000 Units   Take 1 tablet (1,000 Units) by mouth daily   Quantity:  100 tablet   Refills:  3       cyclobenzaprine 10 MG tablet   Commonly known as:  FLEXERIL   This may have changed:  when to take this   Used for:  Immunosuppression (H)        Dose:  10 mg   Take 1 tablet (10 mg) by mouth 3 times daily as needed for muscle spasms   Quantity:  90 tablet   Refills:  0       linagliptin 5 MG Tabs tablet   Commonly known as:  TRADJENTA   This may have changed:  when to take this   Used for:  Type 2 diabetes mellitus with diabetic polyneuropathy, with long-term current use of insulin (H)        Dose:  5 mg   Take 1 tablet (5 mg) by mouth daily   Quantity:  90 tablet   Refills:  3       metoprolol succinate 50 MG 24 hr tablet   Commonly known as:  TOPROL-XL   This may have changed:  when to take this   Used for:  Benign essential hypertension, Persistent proteinuria, Hyperkalemia        Dose:  50 mg   Take 1 tablet (50 mg) by mouth daily   Quantity:  30 tablet   Refills:  11       mycophenolic acid 360 MG EC tablet   Commonly known as:  GENERIC EQUIVALENT   This may have changed:  when to take this   Used for:  History of liver transplant (H), Long-term use of immunosuppressant medication        Dose:  720 mg   Take 2 tablets (720 mg) by mouth every 12 hours   Quantity:  120 tablet   Refills:  3       predniSONE 2.5 MG tablet   Commonly known as:  DELTASONE   This  may have changed:  when to take this   Used for:  Liver replaced by transplant (H), Long-term use of immunosuppressant medication        Dose:  2.5 mg   Take 1 tablet (2.5 mg) by mouth daily   Quantity:  90 tablet   Refills:  3            Where to get your medicines      These medications were sent to [id number] - [place], [place] - [address] AT [facility] [address], [place] [address]    Hours:  called pharm.  they do accept faxes Phone:  [phone]     NovoLOG FLEXPEN 100 UNIT/ML injection                Primary Care Provider Office Phone # Fax #    Shay Kirkpatrick [phone]       [address] [place] [address] [address] [place] [address]        Equal Access to Services     FRANKLIN PORTILLO : Hadii tavo swartz hadasho Sojose, waaxda luqadaha, qaybta kaalmada adeelenayamachelle, devi duckworth. So [facility] [phone].    ATENCIÓN: Si habla español, tiene a zheng disposición servicios gratuitos de asistencia lingüística. Vitalyjabari al [phone].    We comply with applicable federal civil rights laws and [place] laws. We do not discriminate on the basis of race, color, national origin, age, disability, sex, sexual orientation, or gender identity.            Thank you!     Thank you for choosing [facility]  for your care. Our goal is always to provide you with excellent care. Hearing back from our patients is one way we can continue to improve our services. Please take a few minutes to complete the written survey that you may receive in the mail after your visit with us. Thank you!             Your Updated Medication List - Protect others around you: Learn how to safely use, store and throw away your medicines at www.disposemymeds.org.          This list is accurate as of 9/10/18  2:33 PM.  Always use your most recent med list.                   Brand Name Dispense Instructions for use Diagnosis    amLODIPine 10 MG  tablet    NORVASC    90 tablet    Take 1 tablet (10 mg) by mouth daily    HTN (hypertension)       BLOOD GLUCOSE TEST STRIPS Strp     100 each    1 strip by Lancet route 4 times daily    Type 2 diabetes mellitus with diabetic polyneuropathy, with long-term current use of insulin (H)       cholecalciferol 1000 UNIT tablet    vitamin D3    100 tablet    Take 1 tablet (1,000 Units) by mouth daily    Vitamin D deficiency       CIALIS 5 MG tablet   Generic drug:  tadalafil      Take 5 mg by mouth as needed        cyclobenzaprine 10 MG tablet    FLEXERIL    90 tablet    Take 1 tablet (10 mg) by mouth 3 times daily as needed for muscle spasms    Immunosuppression (H)       furosemide 40 MG tablet    LASIX    60 tablet    Take 1 tablet (40 mg) by mouth 2 times daily    Hyperkalemia, Persistent proteinuria, Benign essential hypertension       gabapentin 300 MG capsule    NEURONTIN    270 capsule    TAKE ONE CAPSULE BY MOUTH THREE TIMES DAILY    Low back pain at multiple sites       insulin glargine 100 UNIT/ML injection    LANTUS    18 mL    Inject 50 Units Subcutaneous every morning    Type 2 diabetes mellitus with diabetic polyneuropathy, with long-term current use of insulin (H)       insulin pen needle 32G X 4 MM    BD ENE U/F    100 each    Use 1 pen needles daily or as directed.    Type 2 diabetes mellitus with diabetic polyneuropathy, with long-term current use of insulin (H)       linagliptin 5 MG Tabs tablet    TRADJENTA    90 tablet    Take 1 tablet (5 mg) by mouth daily    Type 2 diabetes mellitus with diabetic polyneuropathy, with long-term current use of insulin (H)       losartan 50 MG tablet    COZAAR    90 tablet    Take 1 tablet (50 mg) by mouth daily    Persistent proteinuria, Hyperkalemia, Benign essential hypertension       metoprolol succinate 50 MG 24 hr tablet    TOPROL-XL    30 tablet    Take 1 tablet (50 mg) by mouth daily    Benign essential hypertension, Persistent proteinuria, Hyperkalemia        Multi-vitamin Tabs tablet      Take 1 tablet by mouth every morning        mycophenolic acid 360 MG EC tablet    GENERIC EQUIVALENT    120 tablet    Take 2 tablets (720 mg) by mouth every 12 hours    History of liver transplant (H), Long-term use of immunosuppressant medication       NovoLOG FLEXPEN 100 UNIT/ML injection   Generic drug:  insulin aspart     42 mL    Administer 1 U Novolog per 10 grams CHO for all meals and snacks. Total daily insulin dose up to 45 U.    Type 2 diabetes mellitus with diabetic nephropathy, with long-term current use of insulin (H)       omeprazole 20 MG CR capsule    priLOSEC    60 capsule    Take 1 capsule (20 mg) by mouth 2 times daily    Long-term use of immunosuppressant medication, History of liver transplant (H)       patiromer 8.4 g packet    VELTASSA    180 each    Take 16.8 g by mouth daily    Hyperkalemia       predniSONE 2.5 MG tablet    DELTASONE    90 tablet    Take 1 tablet (2.5 mg) by mouth daily    Liver replaced by transplant (H), Long-term use of immunosuppressant medication       tacrolimus 1 MG capsule    GENERIC EQUIVALENT    330 capsule    Take 6mg (6capsules) every morning, and take 5mg (5capsules) every evening, take every 12 hours.    Liver transplant recipient (H)       ursodiol 500 MG Tabs     180 tablet    Take 500 mg by mouth 2 times daily    Liver replaced by transplant (H)

## 2018-09-10 NOTE — PATIENT INSTRUCTIONS
Check BG before meals and at bedtime   Administer 1 U Novolog per 10 grams CHO for all meals and snacks, with the exception of the meals or snacks followed by physical exercise or activity   Use the following correction scale for the premeal B U Novolog per 25 points above 150:    150 - 275 - 1 U   276 - 300 - 2 U   201 - 325 - 3 U   326 - 350 - 4 U   351 - 375 - 5 U   ...    Use the following correction scale for the bedtime B U Novolog per 25 points above 200:     200 - 225 - 1 U  225 - 250 - 2 U   251 - 275 - 3 U   275 - 300 - 4 U   301 - 325 - 5 U   326 - 350 - 6 U       Bring to the apt with the dietician the records of the CHO intake and insulin administered   Discontinue Trajenta   As the BG numbers during daytime improve, you might need to decrease the dose of Lantus to 45 U

## 2018-09-10 NOTE — PROGRESS NOTES
HPI: Camacho Bhagat is a 52 year old year old male who is here for a clinic visit.   he  has a past medical history of Depressive disorder, not elsewhere classified; Diabetes type 2, controlled (H) (11/10/2016); Esophageal reflux; Fibromyalgia (1/2009); Gangrene of finger (H) (8/25/2017); H/O deep venous thrombosis (11/2001); H/O CASTAÑEDA (nonalcoholic steatohepatitis); H/O Pneumonia, organism unspecified(486) (10/2001); H/O: HTN (hypertension) (11/2001); History of CVA (cerebrovascular accident); History of hepatocellular carcinoma; History of liver transplant (H); History of obstructive sleep apnea; HLD (hyperlipidemia); Ischemia of both lower extremities (8/25/2017); Liver transplant rejection (H) (6/11/2018); Neutropenic colitis (H) (7/4/2017); Osteoarthritis; Presence of PERMANENT IVC filter; and Rheumatoid arthritis(714.0).    History of Diabetes  Type 2   This was diagnosed in 2002.  Oral meds first, later placed on insulin for the last 6-7 years.     Related history: h/o CASTAÑEDA cirrohcis +/- work exposures, and HCC s/p liver transplant 3/2017. Course complicated by acute abdomen/neutropenic fever requiring right hemicolectomy and colostomy Fall 2017. He later underwent colostomy takedown 1/5/18 which was complicated by abdominal wall abscess at a drain site. During this course, he has also developed excessive proteinuria which is followed by nephrology.     Current DM Regimen:  -lantus 50u qam  -trajdenta 5mg in AM    Related meds:  Prednisone started for transplant 2.5mg per day. Started spring 2018 as tacro had to be decreased due to SE of hyperkalemia.  Since his last visit here, the dose of tacrolimus was gradually increased and the dose was maxed out 3 weeks ago.  Subsequently, the patient has noticed higher blood sugar numbers.    The glucometer readings reveal that he checks his blood glucose twice daily.  Most of the times, he checks prior to eating, occasionally within 1 hour after eating.  In the last  weeks, he has been using a NovoLog flex pain which he has left from a prior hospital admission from last winter, and takes 1 unit per 25 mg above 200 at bedtime.  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  On the meter, the average blood glucose is 233 with a standard deviation of 81 and a range variable between 120 and 374.  Fasting blood sugar is quite variable, from 127-302.  On average, he has 2 meals a day, breakfast (anywhere from 7 to 10 AM) and dinner (around 6 to 7:30 PM).  The snacks are rare, at variable times.  Denies experiencing hypoglycemic symptoms.    Current Treatment  Diabetes Medication(s)     Dipeptidyl Peptidase-4 (DPP-4) Inhibitors Sig    linagliptin (TRADJENTA) 5 MG TABS tablet Take 1 tablet (5 mg) by mouth daily     Patient taking differently:  Take 5 mg by mouth every morning     Insulin Sig    insulin glargine (LANTUS) 100 UNIT/ML injection Inject 50 Units Subcutaneous every morning    NOVOLOG FLEXPEN 100 UNIT/ML soln Administer 1 U Novolog per 10 grams CHO for all meals and snacks. Total daily insulin dose up to 45 U.        Diet: 2 meals per day  Breakfast: varies, example - english muffin, sausage egg/cheese buscuit   Lunch: skips   Dinner: rarely eats out,cooks at home, example - baked chicken, veggies, rice  Snacks: none  Alcohol or sugared beverages: none.     Exercise   Walks dog every day, 1-2 miles    Complications    + chronic complications.     1. Retinopathy: No known eye disease  Last eye exam was 8/2018.  H/o DR. He has cataracts.      2.  Nephropathy: yes. He has significant proteinuria and is scheduled to be seen by nephrology this Friday.  Recent GFR in the 50s.  He was started on losartan on 4/4/18 and the dose was increased to 50 mg daily.    Albumin Urine mg/g Cr   Date Value Ref Range Status   10/27/2017 132.46 (H) 0 - 17 mg/g Cr Final     Medication (Note: This includes the hypertensive combination  subclass to make sure to show all ACEI/ARB's.)     Angiotensin II Receptor Antagonists Sig    losartan (COZAAR) 50 MG tablet Take 1 tablet (50 mg) by mouth daily          3. Neuropathy - left foot - pins/needles sensation.   Last monofilament testin/18    5. Macrovascular: none     6. Lipids: not on statin , last LDL in 30s    Past Medical History:   Diagnosis Date     Depressive disorder, not elsewhere classified      Diabetes type 2, controlled (H) 11/10/2016     Esophageal reflux      Fibromyalgia 2009    dx with Dr Benitez( Rheum)     Gangrene of finger (H) 2017     H/O deep venous thrombosis 2001    Permanent IVC filter in place.     H/O CASTAÑEDA (nonalcoholic steatohepatitis)      H/O Pneumonia, organism unspecified(486) 10/2001    Included ARDS, sepsis, and  acute renal failure; hospitalized, required tracheostomy placement.     H/O: HTN (hypertension) 2001    No longer prescribed antihypertensive medication.       History of CVA (cerebrovascular accident)      History of hepatocellular carcinoma      History of liver transplant (H)      History of obstructive sleep apnea     No longer wears CPAP since losing approximately 200 pounds with his liver transplant and its complications.       HLD (hyperlipidemia)      Ischemia of both lower extremities 2017    Distal ischemia due to shock/high pressor requirements     Liver transplant rejection (H) 2018     Neutropenic colitis (H) 2017     Osteoarthritis      Presence of PERMANENT IVC filter      Rheumatoid arthritis(714.0)    DEXA scan normal prior to transplant, in     Past Surgical History:   Procedure Laterality Date     BENCH LIVER N/A 3/4/2017    Procedure: BENCH LIVER;  Surgeon: Jovan Tran MD;  Location: [facility]     C TOTAL KNEE ARTHROPLASTY      Right knee arthroscopy     CHOLECYSTECTOMY       COLONOSCOPY N/A 2017    Procedure: COMBINED COLONOSCOPY, SINGLE OR MULTIPLE BIOPSY/POLYPECTOMY BY BIOPSY;   Colonoscopy;  Surgeon: Izaiah Montes MD;  Location: [facility]     ENDOSCOPIC RETROGRADE CHOLANGIOPANCREATOGRAM N/A 5/22/2018    Procedure: COMBINED ENDOSCOPIC RETROGRADE CHOLANGIOPANCREATOGRAPHY, PLACE TUBE/STENT;  Endoscopic Retrograde Cholangiopancreatography with sphincterotomy and pancreatic duct stent placement;  Surgeon: Zay Gaitan MD;  Location: [facility]     ENT SURGERY      Repair of deviated septum     ESOPHAGOSCOPY, GASTROSCOPY, DUODENOSCOPY (EGD), COMBINED N/A 8/4/2016    Procedure: COMBINED ESOPHAGOSCOPY, GASTROSCOPY, DUODENOSCOPY (EGD), BIOPSY SINGLE OR MULTIPLE;  Surgeon: Trent Pederson MD;  Location:  GI     ESOPHAGOSCOPY, GASTROSCOPY, DUODENOSCOPY (EGD), COMBINED N/A 8/27/2017    Procedure: COMBINED ESOPHAGOSCOPY, GASTROSCOPY, DUODENOSCOPY (EGD);;  Surgeon: Los Wynn MD;  Location: [facility]     INCISION AND DRAINAGE ABDOMEN WASHOUT, COMBINED Right 2/14/2018    Procedure: COMBINED INCISION AND DRAINAGE ABDOMEN WASHOUT;  COMBINED INCISION AND DRAINAGE right ABDOMEN flank;  Surgeon: Sara Dinh MD;  [facility]: [facility]     LAPAROTOMY EXPLORATORY N/A 7/4/2017    Procedure: LAPAROTOMY EXPLORATORY;  Exploratory Laparotomy, washout;  Surgeon: Tip Zhang MD;  [facility]     LAPAROTOMY EXPLORATORY N/A 7/5/2017    Procedure: LAPAROTOMY EXPLORATORY;  Exploratory Laparotomy, Washout with closure.;  Surgeon: Tip Zhang MD;  [facility]     LAPAROTOMY EXPLORATORY N/A 7/26/2017    Procedure: LAPAROTOMY EXPLORATORY;  Exploratory Laparotomy, Right angelique-colectomy, end ileostomy, mucosal fistula, partial omentectomy;  Surgeon: Sara Dinh MD;  [facility]     ORTHOPEDIC SURGERY Right     Repair of dislocated wrist.       RELEASE TRIGGER FINGER Right 11/10/2017    Procedure: RELEASE TRIGGER FINGER;  Debride, V-Y Flap Right Index Finger;  Surgeon: Momo Noonan MD;  [facility]     TAKEDOWN ILEOSTOMY N/A 1/5/2018     Procedure: TAKEDOWN ILEOSTOMY;  Exploratory Laparotomy, Lysis of Adhesions, Takedown Of End Ileostomy, Takedown of mucocutaneous fistula, ileocolic resection  And Colorectal Anastomosis, Primary repair of Ventral Hernia, Anesthesia Block ;  Surgeon: Sara Dinh MD;  [facility]: [facility]     TRACHEOSTOMY      During hospitalization for pneumonia.       TRANSPLANT LIVER RECIPIENT  DONOR N/A 3/4/2017    Procedure: TRANSPLANT LIVER RECIPIENT  DONOR;  Surgeon: Jovan Tran MD;  [facility]: [facility]           Review of Systems     Constitutional: weight up 20 lbs since . Appetite is good   Head: no headache   Eye: no vision change/ loss of peripheral vision.   ENT: No throat congestion.   Respiratory: no cough   Cardiovascular: no chest pain or tachycardia  Gastrointestinal: Negative for vomiting, abdominal pain and diarrhea.  Genitourinary: No bladder problems.  Musculoskeletal: Negative for myalgias. No weakness. Diffuse joint pain - mainly elbows and knees.   Neurological: Negative for seizures and headaches. Muscle cramps - mainly hands.   Psychiatric/Behavioral: Negative for behavioral problem and dysphoric mood.    Past Medical History:   Diagnosis Date     Depressive disorder, not elsewhere classified      Diabetes type 2, controlled (H) 11/10/2016     Esophageal reflux      Fibromyalgia 2009    dx with Dr Benitez( Rheum)     Gangrene of finger (H) 2017     H/O deep venous thrombosis 2001    Permanent IVC filter in place.     H/O CASTAÑEDA (nonalcoholic steatohepatitis)      H/O Pneumonia, organism unspecified(486) 10/2001    Included ARDS, sepsis, and  acute renal failure; hospitalized, required tracheostomy placement.     H/O: HTN (hypertension) 2001    No longer prescribed antihypertensive medication.       History of CVA (cerebrovascular accident)      History of hepatocellular carcinoma      History of liver transplant (H)      History of obstructive sleep apnea      No longer wears CPAP since losing approximately 200 pounds with his liver transplant and its complications.       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     Osteoarthritis      Presence of PERMANENT IVC filter      Rheumatoid arthritis(714.0)        Past Surgical History:   Procedure Laterality Date     BENCH LIVER N/A 3/4/2017    Procedure: BENCH LIVER;  Surgeon: Jovan Tran MD;  Location:  OR     C TOTAL KNEE ARTHROPLASTY  2008    Right knee arthroscopy     CHOLECYSTECTOMY       COLONOSCOPY N/A 7/21/2017    Procedure: COMBINED COLONOSCOPY, SINGLE OR MULTIPLE BIOPSY/POLYPECTOMY BY BIOPSY;  Colonoscopy;  Surgeon: Izaiah Montes MD;  Location:  GI     ENDOSCOPIC RETROGRADE CHOLANGIOPANCREATOGRAM N/A 5/22/2018    Procedure: COMBINED ENDOSCOPIC RETROGRADE CHOLANGIOPANCREATOGRAPHY, PLACE TUBE/STENT;  Endoscopic Retrograde Cholangiopancreatography with sphincterotomy and pancreatic duct stent placement;  Surgeon: Zay Gaitan MD;  [facility]     ENT SURGERY      Repair of deviated septum     ESOPHAGOSCOPY, GASTROSCOPY, DUODENOSCOPY (EGD), COMBINED N/A 8/4/2016    Procedure: COMBINED ESOPHAGOSCOPY, GASTROSCOPY, DUODENOSCOPY (EGD), BIOPSY SINGLE OR MULTIPLE;  Surgeon: Trent Pederson MD;  Location:  GI     ESOPHAGOSCOPY, GASTROSCOPY, DUODENOSCOPY (EGD), COMBINED N/A 8/27/2017    Procedure: COMBINED ESOPHAGOSCOPY, GASTROSCOPY, DUODENOSCOPY (EGD);;  Surgeon: Los Wynn MD;  Location:  GI     INCISION AND DRAINAGE ABDOMEN WASHOUT, COMBINED Right 2/14/2018    Procedure: COMBINED INCISION AND DRAINAGE ABDOMEN WASHOUT;  COMBINED INCISION AND DRAINAGE right ABDOMEN flank;  Surgeon: Sara Dinh MD;  [facility]     LAPAROTOMY EXPLORATORY N/A 7/4/2017    Procedure: LAPAROTOMY  EXPLORATORY;  Exploratory Laparotomy, washout;  Surgeon: Tip Zhang MD;  [facility]     LAPAROTOMY EXPLORATORY N/A 2017    Procedure: LAPAROTOMY EXPLORATORY;  Exploratory Laparotomy, Washout with closure.;  Surgeon: Tip Zhang MD;  [facility]     LAPAROTOMY EXPLORATORY N/A 2017    Procedure: LAPAROTOMY EXPLORATORY;  Exploratory Laparotomy, Right angelique-colectomy, end ileostomy, mucosal fistula, partial omentectomy;  Surgeon: Sraa Dinh MD;  [facility]     ORTHOPEDIC SURGERY Right     Repair of dislocated wrist.       RELEASE TRIGGER FINGER Right 11/10/2017    Procedure: RELEASE TRIGGER FINGER;  Debride, V-Y Flap Right Index Finger;  Surgeon: Momo Noonan MD;  [facility]     TAKEDOWN ILEOSTOMY N/A 2018    Procedure: TAKEDOWN ILEOSTOMY;  Exploratory Laparotomy, Lysis of Adhesions, Takedown Of End Ileostomy, Takedown of mucocutaneous fistula, ileocolic resection  And Colorectal Anastomosis, Primary repair of Ventral Hernia, Anesthesia Block ;  Surgeon: Sara Dinh MD;  [facility]     TRACHEOSTOMY  2001    During hospitalization for pneumonia.       TRANSPLANT LIVER RECIPIENT  DONOR N/A 3/4/2017    Procedure: TRANSPLANT LIVER RECIPIENT  DONOR;  Surgeon: Jovan Tran MD;  [facility]       Family History   Problem Relation Age of Onset     Diabetes Father      Hypertension Father      Substance Abuse Father      Arthritis Mother      Thyroid Cancer Mother      Survivor!     Cervical Cancer Maternal Grandmother      Cerebrovascular Disease Maternal Grandfather      No Known Problems Paternal Grandmother      Prostate Cancer Paternal Grandfather      Colon Cancer No family hx of      Hyperlipidemia No family hx of      Coronary Artery Disease No family hx of      Breast Cancer No family hx of          Social History     Social History     Marital status: ,      Spouse name: N/A      Number of children: 1     Years of education: N/A     Occupational History            Social History Main Topics     Smoking status: Former Smoker     Packs/day: 0.75     Years: 2.00     Quit date: 1988     Smokeless tobacco: Former User     Types: Chew     Quit date: 10/8/2015     Alcohol use No      Comment: No alcohol since liver transplant.       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, not currently working         Lives alone    Objective:   /90  Pulse 87  Wt 102.5 kg (225 lb 14.4 oz)  BMI 30.64 kg/m2  Wt Readings from Last 10 Encounters:   09/10/18 102.5 kg (225 lb 14.4 oz)   08/22/18 101.9 kg (224 lb 9.6 oz)   07/25/18 101.2 kg (223 lb)   07/06/18 101.2 kg (223 lb)   06/06/18 100.6 kg (221 lb 11.2 oz)   05/22/18 98.1 kg (216 lb 4.3 oz)   05/18/18 98.5 kg (217 lb 1.6 oz)   04/11/18 93 kg (205 lb)   04/04/18 94.8 kg (209 lb)   03/28/18 92.1 kg (203 lb)     General: well developed, well nourished, in NAD    In House Labs:   Lab Results   Component Value Date    A1C 5.1 01/06/2018    A1C  07/06/2017     Canceled, Test credited   Below Assay Range  NOTIFIED LEONADR ONEILL AT 0538 ON 7/6/17 BY CHRISSY      A1C 9.4 02/16/2017    A1C 6.2 12/14/2016    A1C 6.1 10/27/2016       TSH   Date Value Ref Range Status   04/02/2018 2.74 0.40 - 4.00 mU/L Final   02/08/2016 3.35 0.40 - 4.00 mU/L Final   04/11/2011 2.77 0.4 - 5.0 mU/L Final   02/15/2010 2.53 0.4 - 5.0 mU/L Final   07/31/2009 1.95 0.4 - 5.0 mU/L Final     T4 Free   Date Value Ref Range Status   04/11/2011 1.23 0.70 - 1.85 ng/dL Final   02/15/2010 0.90 0.70 - 1.85 ng/dL Final       Creatinine   Date Value Ref Range Status   09/04/2018 1.47 (H) 0.66 - 1.25 mg/dL Final   ]    Recent Labs   Lab Test  04/02/18   1042  02/26/18   1111   07/03/12   0710   CHOL  97  117   < >  133    HDL  36*  30*   < >  35*   LDL  39  29   < >  62   TRIG  109  293*   < >  182*   CHOLHDLRATIO   --    --    --   3.8    < > = values in this interval not displayed.         Assessment/Treatment Plan:      Camacho Bhagat is a 52 year old male with    1. Type 2 Diabetes: A1c less reliable in the context of anemia.    Recently, the glycemic control deteriorated in the context of increased dose of tacrolimus.   Recommendations:  Check blood glucose before meals and at bedtime  Start NovoLog at 1 unit per 10 g carbohydrates for all meals and snacks, with the exception of the meals and snacks followed by physical activity or exercise  Use the correction scale of 1 unit per 25 mg above 150 for the pre-meal blood sugar and above 200 for the bedtime blood sugar  Schedule an appointment with the dietitian to review in detail carbohydrate counting  Keep written records of the blood sugar, carbohydrate intake and insulin dose administered and bring them to the appointment with the dietitian  Discontinue Tradjenta  As the blood sugar numbers during daytime improve, he might need to decrease the dose of Lantus to 45 units    I counseled the patient on the pharmacokinetics of short acting insulin, the correct administration of this medication (10-15 minutes prior to food intake), the use of the correction scale.     2. DM complications  Retinopathy - due for eye exam, referral placed  Nephropathy - + proteinuria, managed by nephrology given complicated case, starting on losartan  Neuropathy - + sx of parasthesias, + h/o gangrene associated with critical illness and pressor use, requried wound debridement but now healed. Normal foot exam 04/11/18   BP - elevated today, managed by nephrology and he has a scheduled follow-up appointment this week.  CVD/Lipids - not on statin but LDL in 30s, will continue to monitor.    Test and/or medications prescribed today:  Orders Placed This Encounter   Procedures     NUTRITION REFERRAL      Answers for HPI/ROS submitted by the patient on 8/27/2018   General Symptoms: No  Skin Symptoms: No  HENT Symptoms: No  EYE SYMPTOMS: No  HEART SYMPTOMS: No  LUNG SYMPTOMS: No  INTESTINAL SYMPTOMS: No  URINARY SYMPTOMS: No  REPRODUCTIVE SYMPTOMS: Yes  SKELETAL SYMPTOMS: Yes  BLOOD SYMPTOMS: No  NERVOUS SYSTEM SYMPTOMS: No  Back pain: No  Muscle aches: No  Neck pain: No  Swollen joints: No  Joint pain: Yes  Bone pain: No  Muscle cramps: Yes  Muscle weakness: No  Joint stiffness: No  Bone fracture: No  Scrotal pain or swelling: No  Erectile dysfunction: Yes  Penile discharge: No  Genital ulcers: No  Reduced libido: No    Total visit time 25 min/counceling and coordination of care 20 min.

## 2018-09-11 DIAGNOSIS — N18.30 CKD (CHRONIC KIDNEY DISEASE) STAGE 3, GFR 30-59 ML/MIN (H): Primary | ICD-10-CM

## 2018-09-11 RX ORDER — INSULIN LISPRO 100 [IU]/ML
INJECTION, SOLUTION INTRAVENOUS; SUBCUTANEOUS
Qty: 45 ML | Refills: 3 | Status: SHIPPED | OUTPATIENT
Start: 2018-09-11 | End: 2018-11-19

## 2018-09-12 ENCOUNTER — TELEPHONE (OUTPATIENT)
Dept: ENDOCRINOLOGY | Facility: CLINIC | Age: 53
End: 2018-09-12

## 2018-09-14 ENCOUNTER — OFFICE VISIT (OUTPATIENT)
Dept: NEPHROLOGY | Facility: CLINIC | Age: 53
End: 2018-09-14
Payer: COMMERCIAL

## 2018-09-14 VITALS
DIASTOLIC BLOOD PRESSURE: 79 MMHG | TEMPERATURE: 98.4 F | BODY MASS INDEX: 30.57 KG/M2 | OXYGEN SATURATION: 99 % | HEART RATE: 77 BPM | SYSTOLIC BLOOD PRESSURE: 146 MMHG | WEIGHT: 225.4 LBS

## 2018-09-14 DIAGNOSIS — N18.30 ANEMIA OF CHRONIC RENAL FAILURE, STAGE 3 (MODERATE) (H): ICD-10-CM

## 2018-09-14 DIAGNOSIS — N18.30 CKD (CHRONIC KIDNEY DISEASE) STAGE 3, GFR 30-59 ML/MIN (H): ICD-10-CM

## 2018-09-14 DIAGNOSIS — D63.1 ANEMIA OF CHRONIC RENAL FAILURE, STAGE 3 (MODERATE) (H): ICD-10-CM

## 2018-09-14 DIAGNOSIS — R80.1 PERSISTENT PROTEINURIA: ICD-10-CM

## 2018-09-14 DIAGNOSIS — I10 BENIGN ESSENTIAL HYPERTENSION: Primary | ICD-10-CM

## 2018-09-14 DIAGNOSIS — E87.5 HYPERKALEMIA: ICD-10-CM

## 2018-09-14 DIAGNOSIS — E55.9 VITAMIN D DEFICIENCY: ICD-10-CM

## 2018-09-14 PROCEDURE — G0463 HOSPITAL OUTPT CLINIC VISIT: HCPCS | Mod: ZF

## 2018-09-14 RX ORDER — METOPROLOL SUCCINATE 100 MG/1
100 TABLET, EXTENDED RELEASE ORAL DAILY
Qty: 90 TABLET | Refills: 3 | Status: SHIPPED | OUTPATIENT
Start: 2018-09-14 | End: 2019-04-03

## 2018-09-14 ASSESSMENT — PAIN SCALES - GENERAL: PAINLEVEL: NO PAIN (0)

## 2018-09-14 NOTE — PROGRESS NOTES
Nephrology Clinic Visit 9/14/18    Assessment and Plan:       1. CKD3 w/proteinuria - Creat 1.4, eGFR 50 ml/mn. UPCR 0.5 g/gCr on 8/22/18. Baseline in the mid 1 range.   Etiology of CKD likely multifactorial; DM, recurrent EJ, CNI. Has been intolerant of ACE/ARB previously given problems with hyperkalemia assoc with Tacrolimus, but retrial when TAC level was <0.3 did not result in Hyperkalemia. In fact Valtessa had been discontinued because he was becoming hypokalemic.      Patient was restarted on ARB in 4/18 for unexplained nephrotic range proteinuria following ostomy takedown with improvement in UPCR, but then developed mild acute rejection and Tac dose was increased resulting in hyperkalemia   He was restarted on Valtassa with improvement in hyperkalemia  Current blood pressures still not at goal.        - Patient developed significant unexplained proteinuria at the end of January 2018 following his ileostomy takedown. Baseline Urine protein/creat had been in the 0.8 g/gCr range and then jumped to 9.57 g/gCr on 1/31/18. Blood pressures were in the 160-170/ range at that time. SPEP/immunofixation neg for monoclonal protein, Kappa/Lambda light chain ratio normal. On 2/28/18 Urine protein/creat ratio declined to 3.59 g/gCr. UA neg for hematuria. Patient was started on Losartan on 4/4/18 and now UPCR has declined to 0.51 g/gCr in Aug which was better than his baseline.  A1c 5.5% in April 2018          - Given uncontrolled HTN, will increase Toprol XL to 100 mg every day. Would like to see his blood pressure in the 130/ range     - OK to have lab frequency per liver transplant    - Avoid NSAIDs, nephrotoxins   - A1c goal is 7%. HgA1c 5.5% April 2018   - B/P goal is < 130/80. Not at goal       2. HTN - Uncontrolled. Home blood pressures 140-160/ on Losartan 50 mg every day. HR 77. No edema. No dyspnea or CP. His weight has not stabilized.    Current antihypertensive regimen:      Lasix 40 mg bid     Amlodipine 10 mg  qd      Toprol XL 50 mg every day       Losartan 50 mg qd    - Will Increase Toprol to 100 mg every day   - Continue labs per transplant   - Continue to monitor blood pressures with increase in Toprol. Will call next week to get readings      3. Hyperkalemia assoc with Tac/ARB - Potassium running ~ 5.0 on Losartan 50 mg every day and Valtassa 16.8 g/day.    - Continue current regimen      4. Volume status - Euvolemic. No edema/dyspnea. Does have increased stools when TAC dose is increased. Weight 225 # and stable from last visit.  Blood pressures not at goal.    - Continue Lasix 40 mg bid.       5. Acid base - No acute concerns. Metabolic acidosis resolved following ileostomy takedown. Bicarb 22.   Off supplement.       6. BMD - Ca 8.4, Phos 2.9, albumin 3.7   - Vitamin D 20 and PTH 34 (9/14/17). Check next visit   - Continue Vit D 1000 U qd      7.Anemia -Hgb up to 10.9.    - Iron studies 7/18: Ferritin 1338, Fe 166, IS 73%   - Had colonoscopy 2017 (poor study), Ileoscopy 8/17 - nml   - Not on iron and has not needed DIPAK      8. Liver transplant IS: Tacrolimus, MMF, Pred. Tac level 5.4   - Tacro dose increased given mild rejection in June 9. Dispo- RCT 3 months for f/u      Reason for Visit:  Hyperkalemia/CKD/HTN follow up        HPI:  Mr Bhagat is a 51 yo male with CKD3, HTN, Hyperkalemia, Liver transplant, in today for HTN followup. Last seen in clinic by me on 8/22/18. Lostartan was increased to 50 mg daily and Valtessa was increased to 16.8 g/day. Home blood pressures remain out of goal. K has been ~ 5.0. Tac level in the 4-6 range.     Interval Hx:       No hospital admissions.       ROS:  No complaints. Feeling well. No dyspnea, CP, abdominal pain. Voiding w/o difficulty. Exercising regularly. Considering going back to work. Appetite is good.         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 Former smoker,   by profession. Working on recertification for MA position. Exercising regularly.    Allergies:  Allergies   Allergen Reactions     Erythromycin GI Disturbance     Vioxx      Nausea, vomiting       Medications:  Prior to Admission medications    Medication Sig Start Date End Date Taking? Authorizing Provider   amLODIPine (NORVASC) 10 MG tablet Take 1 tablet (10 mg) by mouth daily  Patient taking differently: Take 10 mg by mouth every morning  11/7/17  Yes Ninfa Hernández MD   cholecalciferol (VITAMIN D3) 1000 UNIT tablet Take 1 tablet (1,000 Units) by mouth daily  Patient taking differently: Take 1,000 Units by mouth every morning  12/6/17  Yes Cinda Cazares NP   CIALIS 5 MG tablet Take 5 mg by mouth as needed  6/7/17  Yes Reported, Patient   cyclobenzaprine (FLEXERIL) 10 MG tablet Take 1 tablet (10 mg) by mouth 3 times daily as needed for muscle spasms  Patient taking differently: Take 10 mg by mouth as needed for muscle spasms  9/8/17  Yes Felicia Tolliver APRN CNP   furosemide (LASIX) 40 MG tablet Take 1 tablet (40 mg) by mouth 2 times daily 4/4/18  Yes Jael Rubio MD   gabapentin (NEURONTIN) 300 MG capsule TAKE ONE CAPSULE BY MOUTH THREE TIMES DAILY 7/30/18  Yes Toñito Camejo MD   Glucose Blood (BLOOD GLUCOSE TEST STRIPS) STRP 1 strip by Lancet route 4 times daily 4/11/18  Yes Alisa West MD   HUMALOG KWIKPEN 100 UNIT/ML soln 1Unit Novolog per 10 grams CHO for all meals and snacks. Total daily insulin dose up to 45Units. 9/11/18  Yes Alisa West MD   insulin glargine (LANTUS) 100 UNIT/ML injection Inject 50 Units Subcutaneous every morning 4/11/18  Yes Alisa West MD   insulin  pen needle (BD ENE U/F) 32G X 4 MM Use 1 pen needles daily or as directed. 4/11/18  Yes Alisa West MD   linagliptin (TRADJENTA) 5 MG TABS tablet Take 1 tablet (5 mg) by mouth daily  Patient taking differently: Take 5 mg by mouth every morning  4/11/18  Yes Alisa West MD   losartan (COZAAR) 50 MG tablet Take 1 tablet (50 mg) by mouth daily 8/22/18  Yes Cinda Cazares NP   metoprolol succinate (TOPROL-XL) 100 MG 24 hr tablet Take 1 tablet (100 mg) by mouth daily 9/14/18  Yes Cinda Cazares NP   multivitamin, therapeutic with minerals (MULTI-VITAMIN) TABS tablet Take 1 tablet by mouth every morning   Yes Reported, Patient   mycophenolic acid (GENERIC EQUIVALENT) 360 MG EC tablet Take 2 tablets (720 mg) by mouth every 12 hours  Patient taking differently: Take 720 mg by mouth 2 times daily  11/20/17  Yes Jovan Tran MD   NOVOLOG FLEXPEN 100 UNIT/ML soln Administer 1 U Novolog per 10 grams CHO for all meals and snacks. Total daily insulin dose up to 45 U. 9/10/18  Yes Alisa West MD   omeprazole (PRILOSEC) 20 MG CR capsule Take 1 capsule (20 mg) by mouth 2 times daily 5/7/18  Yes Toñito Camejo MD   patiromer (VELTASSA) 8.4 g packet Take 16.8 g by mouth daily 8/27/18  Yes Cinda Cazares NP   predniSONE (DELTASONE) 2.5 MG tablet Take 1 tablet (2.5 mg) by mouth daily  Patient taking differently: Take 2.5 mg by mouth every morning  2/5/18  Yes Jovan Tran MD   tacrolimus (GENERIC EQUIVALENT) 1 MG capsule Take 6mg (6capsules) every morning, and take 5mg (5capsules) every evening, take every 12 hours. 8/15/18  Yes Toñito Camejo MD   ursodiol 500 MG TABS Take 500 mg by mouth 2 times daily 9/5/18  Yes Toñito Camejo MD       Vitals:  /79  Pulse 77  Temp 98.4  F (36.9  C) (Oral)  Wt 102.2 kg (225 lb 6.4 oz)  SpO2 99%  BMI 30.57 kg/m2    Exam:  Gen: Well groomed, pleasant  CARDIAC: RRR  LUNGS: CTA  ABDOMEN: Abdomin NT. Non  distended.  EXT: No edema     Results:    Results for RONNIE ARIZMENDI (MRN [id number]) as of 9/14/2018 13:49   Ref. Range 9/4/2018 11:20   Sodium Latest Ref Range: 133 - 144 mmol/L 138   Potassium Latest Ref Range: 3.4 - 5.3 mmol/L 5.3   Chloride Latest Ref Range: 94 - 109 mmol/L 109   Carbon Dioxide Latest Ref Range: 20 - 32 mmol/L 22   Urea Nitrogen Latest Ref Range: 7 - 30 mg/dL 47 (H)   Creatinine Latest Ref Range: 0.66 - 1.25 mg/dL 1.47 (H)   GFR Estimate Latest Ref Range: >60 mL/min/1.7m2 50 (L)   GFR Estimate If Black Latest Ref Range: >60 mL/min/1.7m2 60 (L)   Calcium Latest Ref Range: 8.5 - 10.1 mg/dL 8.4 (L)   Anion Gap Latest Ref Range: 3 - 14 mmol/L 7   Phosphorus Latest Ref Range: 2.5 - 4.5 mg/dL 2.9   Albumin Latest Ref Range: 3.4 - 5.0 g/dL 3.7   Protein Total Latest Ref Range: 6.8 - 8.8 g/dL 6.8   Bilirubin Total Latest Ref Range: 0.2 - 1.3 mg/dL 0.9   Alkaline Phosphatase Latest Ref Range: 40 - 150 U/L 390 (H)   ALT Latest Ref Range: 0 - 70 U/L 78 (H)   AST Latest Ref Range: 0 - 45 U/L 61 (H)   Bilirubin Direct Latest Ref Range: 0.0 - 0.2 mg/dL 0.4 (H)   Glucose Latest Ref Range: 70 - 99 mg/dL 208 (H)   WBC Latest Ref Range: 4.0 - 11.0 10e9/L 3.7 (L)   Hemoglobin Latest Ref Range: 13.3 - 17.7 g/dL 10.9 (L)   Hematocrit Latest Ref Range: 40.0 - 53.0 % 31.4 (L)   Platelet Count Latest Ref Range: 150 - 450 10e9/L 79 (L)   RBC Count Latest Ref Range: 4.4 - 5.9 10e12/L 3.44 (L)   MCV Latest Ref Range: 78 - 100 fl 91   MCH Latest Ref Range: 26.5 - 33.0 pg 31.7   MCHC Latest Ref Range: 31.5 - 36.5 g/dL 34.7   RDW Latest Ref Range: 10.0 - 15.0 % 14.6   Tacrolimus Last Dose Unknown 2100 09/03/2018   Tacrolimus Level Latest Ref Range: 5.0 - 15.0 ug/L 5.4

## 2018-09-14 NOTE — MR AVS SNAPSHOT
After Visit Summary   9/14/2018    Camacho Bhagat    MRN: [id number]           Patient Information     Date Of Birth          1965        Visit Information        Provider Department      9/14/2018 12:30 PM Cinda Cazares NP [facility] Nephrology        Today's Diagnoses     Benign essential hypertension        Persistent proteinuria        Hyperkalemia          Care Instructions    1. Increase Toprol Xl 100 mg daily  2. We'll call you next week for blood pressure check  3. Continue daily blood pressure checks          Follow-ups after your visit        Follow-up notes from your care team     Return in about 3 months (around 12/14/2018).      Your next 10 appointments already scheduled     Sep 17, 2018 10:30 AM CDT   (Arrive by 10:15 AM)   PHYSICAL with Shay Kirkpatrick MD   [facility] ([facility])    [address] [place] [id number] [phone]            Sep 19, 2018 12:30 PM CDT   (Arrive by 12:15 PM)   Diabetes Education with Juanita Peraza RD   [facility] Diabetes ([facility])    [address] [place] [address] [phone]           Please bring to your appointment: 1) 2 - 3 day food journal. Write down everything you eat and drink for 2 - 3 days prior to your appointment. 2) Your insurance card. 3) A blood glucose meter. If you do not have one, contact your pharmacy or clinic prior to your appointment. Your pharmacist can assist you with choosing a meter suitable to your insurance needs. 4)  A list of your medications, including prescription, over the counter and herbal. Include dosage and frequency where appropriate.            Nov 19, 2018  9:30 AM CST   (Arrive by 9:15 AM)   RETURN ENDOCRINE with Alisa West MD   [facility] Endocrinology ([facility])    [address] [place] [address] [phone]             Dec 05, 2018 12:45 PM CST   (Arrive by 12:30 PM)   Return Liver Transplant with Toñito Camejo MD   [facility] Hepatology ([facility])    [address]  [address]  [facility] [address] [phone]            Dec 05, 2018  1:00 PM CST   (Arrive by 12:30 PM)   Return Visit with Cinda Cazares NP   [facility] Nephrology ([facility])    [address]  [address]  [facility] [address] [phone]              Who to contact     If you have questions or need follow up information about today's clinic visit or your schedule please contact [facility] NEPHROLOGY directly at [phone].  Normal or non-critical lab and imaging results will be communicated to you by Tier 3hart, letter or phone within 4 business days after the clinic has received the results. If you do not hear from us within 7 days, please contact the clinic through Tier 3hart or phone. If you have a critical or abnormal lab result, we will notify you by phone as soon as possible.  Submit refill requests through HealthTap or call your pharmacy and they will forward the refill request to us. Please allow 3 business days for your refill to be completed.          Additional Information About Your Visit        HealthTap Information     HealthTap gives you secure access to your electronic health record. If you see a primary care provider, you can also send messages to your care team and make appointments. If you have questions, please call your primary care clinic.  If you do not have a primary care provider, please call [phone] and they will assist you.        Care EveryWhere ID     This is your Care EveryWhere ID. This could be used by other organizations to access your [place] medical records  [id number]        Your Vitals Were     Pulse Temperature Pulse Oximetry BMI (Body Mass Index)          77 98.4  F (36.9  C) (Oral) 99% 30.57 kg/m2         Blood Pressure from Last 3  Encounters:   09/14/18 146/79   09/10/18 153/90   09/05/18 124/79    Weight from Last 3 Encounters:   09/14/18 102.2 kg (225 lb 6.4 oz)   09/10/18 102.5 kg (225 lb 14.4 oz)   08/22/18 101.9 kg (224 lb 9.6 oz)              Today, you had the following     No orders found for display         Today's Medication Changes          These changes are accurate as of 9/14/18  1:19 PM.  If you have any questions, ask your nurse or doctor.               These medicines have changed or have updated prescriptions.        Dose/Directions    amLODIPine 10 MG tablet   Commonly known as:  NORVASC   This may have changed:  when to take this   Used for:  HTN (hypertension)        Dose:  10 mg   Take 1 tablet (10 mg) by mouth daily   Quantity:  90 tablet   Refills:  3       cholecalciferol 1000 UNIT tablet   Commonly known as:  vitamin D3   This may have changed:  when to take this   Used for:  Vitamin D deficiency        Dose:  1000 Units   Take 1 tablet (1,000 Units) by mouth daily   Quantity:  100 tablet   Refills:  3       cyclobenzaprine 10 MG tablet   Commonly known as:  FLEXERIL   This may have changed:  when to take this   Used for:  Immunosuppression (H)        Dose:  10 mg   Take 1 tablet (10 mg) by mouth 3 times daily as needed for muscle spasms   Quantity:  90 tablet   Refills:  0       linagliptin 5 MG Tabs tablet   Commonly known as:  TRADJENTA   This may have changed:  when to take this   Used for:  Type 2 diabetes mellitus with diabetic polyneuropathy, with long-term current use of insulin (H)        Dose:  5 mg   Take 1 tablet (5 mg) by mouth daily   Quantity:  90 tablet   Refills:  3       metoprolol succinate 100 MG 24 hr tablet   Commonly known as:  TOPROL-XL   This may have changed:    - medication strength  - how much to take   Used for:  Benign essential hypertension   Changed by:  Cinda Cazares NP        Dose:  100 mg   Take 1 tablet (100 mg) by mouth daily   Quantity:  90 tablet   Refills:  3        mycophenolic acid 360 MG EC tablet   Commonly known as:  GENERIC EQUIVALENT   This may have changed:  when to take this   Used for:  History of liver transplant (H), Long-term use of immunosuppressant medication        Dose:  720 mg   Take 2 tablets (720 mg) by mouth every 12 hours   Quantity:  120 tablet   Refills:  3       predniSONE 2.5 MG tablet   Commonly known as:  DELTASONE   This may have changed:  when to take this   Used for:  Liver replaced by transplant (H), Long-term use of immunosuppressant medication        Dose:  2.5 mg   Take 1 tablet (2.5 mg) by mouth daily   Quantity:  90 tablet   Refills:  3            Where to get your medicines      These medications were sent to [id number] - [facility] [address] [facility] [address] [place] [address], [facility] [address]    Hours:  called pharm.  they do accept faxes Phone:  [phone]     metoprolol succinate 100 MG 24 hr tablet                Primary Care Provider Office Phone # Fax #    Shay Kirkpatrick [phone]       [address] [place] [address]  [facility] [address]        Equal Access to Services     JOSE PORTILLO AH: Hadii tavo ku hadasho Sopitaali, waaxda luqadaha, qaybta kaalmada adeegyada, devi duckworth. So [facility] [phone].    ATENCIÓN: Si habla español, tiene a zheng disposición servicios gratuitos de asistencia lingüística. Llame al [phone].    We comply with applicable federal civil rights laws and [place] laws. We do not discriminate on the basis of race, color, national origin, age, disability, sex, sexual orientation, or gender identity.            Thank you!     Thank you for choosing [facility] NEPHROLOGY  for your care. Our goal is always to provide you with excellent care. Hearing back from our patients is one way we can continue to improve our services. Please take a few minutes to complete the written survey that you may receive in  the mail after your visit with us. Thank you!             Your Updated Medication List - Protect others around you: Learn how to safely use, store and throw away your medicines at www.disposemymeds.org.          This list is accurate as of 9/14/18  1:19 PM.  Always use your most recent med list.                   Brand Name Dispense Instructions for use Diagnosis    amLODIPine 10 MG tablet    NORVASC    90 tablet    Take 1 tablet (10 mg) by mouth daily    HTN (hypertension)       BLOOD GLUCOSE TEST STRIPS Strp     100 each    1 strip by Lancet route 4 times daily    Type 2 diabetes mellitus with diabetic polyneuropathy, with long-term current use of insulin (H)       cholecalciferol 1000 UNIT tablet    vitamin D3    100 tablet    Take 1 tablet (1,000 Units) by mouth daily    Vitamin D deficiency       CIALIS 5 MG tablet   Generic drug:  tadalafil      Take 5 mg by mouth as needed        cyclobenzaprine 10 MG tablet    FLEXERIL    90 tablet    Take 1 tablet (10 mg) by mouth 3 times daily as needed for muscle spasms    Immunosuppression (H)       furosemide 40 MG tablet    LASIX    60 tablet    Take 1 tablet (40 mg) by mouth 2 times daily    Hyperkalemia, Persistent proteinuria, Benign essential hypertension       gabapentin 300 MG capsule    NEURONTIN    270 capsule    TAKE ONE CAPSULE BY MOUTH THREE TIMES DAILY    Low back pain at multiple sites       HumaLOG KWIKpen 100 UNIT/ML injection   Generic drug:  insulin lispro     45 mL    1Unit Novolog per 10 grams CHO for all meals and snacks. Total daily insulin dose up to 45Units.    Type 2 diabetes mellitus not at goal (H)       insulin glargine 100 UNIT/ML injection    LANTUS    18 mL    Inject 50 Units Subcutaneous every morning    Type 2 diabetes mellitus with diabetic polyneuropathy, with long-term current use of insulin (H)       insulin pen needle 32G X 4 MM    BD ENE U/F    100 each    Use 1 pen needles daily or as directed.    Type 2 diabetes mellitus with  diabetic polyneuropathy, with long-term current use of insulin (H)       linagliptin 5 MG Tabs tablet    TRADJENTA    90 tablet    Take 1 tablet (5 mg) by mouth daily    Type 2 diabetes mellitus with diabetic polyneuropathy, with long-term current use of insulin (H)       losartan 50 MG tablet    COZAAR    90 tablet    Take 1 tablet (50 mg) by mouth daily    Persistent proteinuria, Hyperkalemia, Benign essential hypertension       metoprolol succinate 100 MG 24 hr tablet    TOPROL-XL    90 tablet    Take 1 tablet (100 mg) by mouth daily    Benign essential hypertension       Multi-vitamin Tabs tablet      Take 1 tablet by mouth every morning        mycophenolic acid 360 MG EC tablet    GENERIC EQUIVALENT    120 tablet    Take 2 tablets (720 mg) by mouth every 12 hours    History of liver transplant (H), Long-term use of immunosuppressant medication       NovoLOG FLEXPEN 100 UNIT/ML injection   Generic drug:  insulin aspart     42 mL    Administer 1 U Novolog per 10 grams CHO for all meals and snacks. Total daily insulin dose up to 45 U.    Type 2 diabetes mellitus with diabetic nephropathy, with long-term current use of insulin (H)       omeprazole 20 MG CR capsule    priLOSEC    60 capsule    Take 1 capsule (20 mg) by mouth 2 times daily    Long-term use of immunosuppressant medication, History of liver transplant (H)       patiromer 8.4 g packet    VELTASSA    180 each    Take 16.8 g by mouth daily    Hyperkalemia       predniSONE 2.5 MG tablet    DELTASONE    90 tablet    Take 1 tablet (2.5 mg) by mouth daily    Liver replaced by transplant (H), Long-term use of immunosuppressant medication       tacrolimus 1 MG capsule    GENERIC EQUIVALENT    330 capsule    Take 6mg (6capsules) every morning, and take 5mg (5capsules) every evening, take every 12 hours.    Liver transplant recipient (H)       ursodiol 500 MG Tabs     180 tablet    Take 500 mg by mouth 2 times daily    Liver replaced by transplant (H)

## 2018-09-14 NOTE — LETTER
9/14/2018      RE: Camacho Bhagat  [address]  [facility] [address]       Nephrology Clinic Visit 9/14/18    Assessment and Plan:       1. CKD3 w/proteinuria - Creat 1.4, eGFR 50 ml/mn. UPCR 0.5 g/gCr on 8/22/18. Baseline in the mid 1 range.   Etiology of CKD likely multifactorial; DM, recurrent EJ, CNI. Has been intolerant of ACE/ARB previously given problems with hyperkalemia assoc with Tacrolimus, but retrial when TAC level was <0.3 did not result in Hyperkalemia. In fact Valtessa had been discontinued because he was becoming hypokalemic.      Patient was restarted on ARB in 4/18 for unexplained nephrotic range proteinuria following ostomy takedown with improvement in UPCR, but then developed mild acute rejection and Tac dose was increased resulting in hyperkalemia   He was restarted on Valtassa with improvement in hyperkalemia  Current blood pressures still not at goal.        - Patient developed significant unexplained proteinuria at the end of January 2018 following his ileostomy takedown. Baseline Urine protein/creat had been in the 0.8 g/gCr range and then jumped to 9.57 g/gCr on 1/31/18. Blood pressures were in the 160-170/ range at that time. SPEP/immunofixation neg for monoclonal protein, Kappa/Lambda light chain ratio normal. On 2/28/18 Urine protein/creat ratio declined to 3.59 g/gCr. UA neg for hematuria. Patient was started on Losartan on 4/4/18 and now UPCR has declined to 0.51 g/gCr in Aug which was better than his baseline.  A1c 5.5% in April 2018          - Given uncontrolled HTN, will increase Toprol XL to 100 mg every day. Would like to see his blood pressure in the 130/ range     - OK to have lab frequency per liver transplant    - Avoid NSAIDs, nephrotoxins   - A1c goal is 7%. HgA1c 5.5% April 2018   - B/P goal is < 130/80. Not at goal       2. HTN - Uncontrolled. Home blood pressures 140-160/ on Losartan  50 mg every day. HR 77. No edema. No dyspnea or CP. His weight has not  stabilized.    Current antihypertensive regimen:      Lasix 40 mg bid     Amlodipine 10 mg qd      Toprol XL 50 mg every day       Losartan 50 mg qd    - Will Increase  Toprol to 100 mg every day   - Continue labs per transplant   - Continue to monitor blood pressures with increase in  Toprol. Will call next week to get readings      3. Hyperkalemia assoc with Tac/ARB - Potassium running ~ 5.0 on Losartan 50 mg every day and Valtassa 16.8 g/day.    - Continue current regimen      4. Volume status - Euvolemic. No edema/dyspnea. Does have increased stools when TAC dose is increased. Weight 225 # and stable from last visit.  Blood pressures not at goal.    - Continue Lasix 40 mg bid.       5. Acid base - No acute concerns. Metabolic acidosis resolved following ileostomy takedown. Bicarb 22.   Off supplement.       6. BMD - Ca 8.4, Phos 2.9, albumin 3.7   - Vitamin D 20 and PTH 34 (9/14/17). Check next visit   - Continue Vit D 1000 U qd      7.Anemia -Hgb up to 10.9.    - Iron studies 7/18: Ferritin 1338, Fe 166, IS 73%   - Had colonoscopy 2017 (poor study), Ileoscopy 8/17 - nml   - Not on iron and has not needed DIPAK      8. Liver transplant IS: Tacrolimus, MMF, Pred. Tac level 5.4   - Tacro dose increased given mild rejection in June 9. Dispo- RCT 3 months for f/u      Reason for Visit:  Hyperkalemia/CKD/HTN follow up        HPI:  Mr Bhagat is a 53 yo male with CKD3, HTN, Hyperkalemia, Liver transplant, in today for HTN followup. Last seen in clinic by me on 8/22/18. Lostartan was increased to 50 mg daily and Valtessa was increased to 16.8 g/day. Home blood pressures remain out of goal. K has been ~ 5.0. Tac level in the 4-6 range.     Interval Hx:       No hospital admissions.       ROS:  No complaints. Feeling well. No dyspnea, CP, abdominal pain. Voiding w/o difficulty. Exercising regularly. Considering going back to work. Appetite is good.         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 Former smoker,   by profession. Working on recertification for MA position. Exercising regularly.    Allergies:  Allergies   Allergen Reactions     Erythromycin GI Disturbance     Vioxx      Nausea, vomiting       Medications:  Prior to Admission medications    Medication Sig Start Date End Date Taking? Authorizing Provider   amLODIPine (NORVASC) 10 MG tablet Take 1 tablet (10 mg) by mouth daily  Patient taking differently: Take 10 mg by mouth every morning  11/7/17  Yes Ninfa Hernández MD   cholecalciferol (VITAMIN D3) 1000 UNIT tablet Take 1 tablet (1,000 Units) by mouth daily  Patient taking differently: Take 1,000 Units by mouth every morning  12/6/17  Yes Cinda Cazares NP   CIALIS 5 MG tablet Take 5 mg by mouth as needed  6/7/17  Yes Reported, Patient   cyclobenzaprine (FLEXERIL) 10 MG tablet Take 1 tablet (10 mg) by mouth 3 times daily as needed for muscle spasms  Patient taking differently: Take 10 mg by mouth as needed for muscle spasms  9/8/17  Yes Felicia Tolliver APRN CNP   furosemide (LASIX) 40 MG tablet Take 1 tablet (40 mg) by mouth 2 times daily 4/4/18  Yes Jael Rubio MD   gabapentin (NEURONTIN) 300 MG capsule TAKE ONE CAPSULE BY MOUTH THREE TIMES DAILY 7/30/18  Yes Toñito Camejo MD   Glucose Blood (BLOOD GLUCOSE TEST STRIPS) STRP 1 strip by Lancet route 4 times daily 4/11/18  Yes Alisa West MD   HUMALOG KWIKPEN 100 UNIT/ML soln 1Unit Novolog per 10 grams CHO for all meals and snacks. Total daily insulin dose up to 45Units. 9/11/18  Yes Alisa West MD   insulin glargine (LANTUS) 100 UNIT/ML injection  Inject 50 Units Subcutaneous every morning 4/11/18  Yes Alisa West MD   insulin pen needle (BD ENE U/F) 32G X 4 MM Use 1 pen needles daily or as directed. 4/11/18  Yes Alisa West MD   linagliptin (TRADJENTA) 5 MG TABS tablet Take 1 tablet (5 mg) by mouth daily  Patient taking differently: Take 5 mg by mouth every morning  4/11/18  Yes Alisa West MD   losartan (COZAAR) 50 MG tablet Take 1 tablet (50 mg) by mouth daily 8/22/18  Yes Cinda Cazares NP   metoprolol succinate (TOPROL-XL) 100 MG 24 hr tablet Take 1 tablet (100 mg) by mouth daily 9/14/18  Yes Cinda Cazares NP   multivitamin, therapeutic with minerals (MULTI-VITAMIN) TABS tablet Take 1 tablet by mouth every morning   Yes Reported, Patient   mycophenolic acid (GENERIC EQUIVALENT) 360 MG EC tablet Take 2 tablets (720 mg) by mouth every 12 hours  Patient taking differently: Take 720 mg by mouth 2 times daily  11/20/17  Yes Jovan Tran MD   NOVOLOG FLEXPEN 100 UNIT/ML soln Administer 1 U Novolog per 10 grams CHO for all meals and snacks. Total daily insulin dose up to 45 U. 9/10/18  Yes Alisa West MD   omeprazole (PRILOSEC) 20 MG CR capsule Take 1 capsule (20 mg) by mouth 2 times daily 5/7/18  Yes Toñito Camejo MD   patiromer (VELTASSA) 8.4 g packet Take 16.8 g by mouth daily 8/27/18  Yes Cinda Cazares NP   predniSONE (DELTASONE) 2.5 MG tablet Take 1 tablet (2.5 mg) by mouth daily  Patient taking differently: Take 2.5 mg by mouth every morning  2/5/18  Yes Jovan Tran MD   tacrolimus (GENERIC EQUIVALENT) 1 MG capsule Take 6mg (6capsules) every morning, and take 5mg (5capsules) every evening, take every 12 hours. 8/15/18  Yes Toñito Camejo MD   ursodiol 500 MG TABS Take 500 mg by mouth 2 times daily 9/5/18  Yes Toñito Camejo MD       Vitals:  /79  Pulse 77  Temp 98.4  F (36.9  C) (Oral)  Wt 102.2 kg (225 lb 6.4 oz)  SpO2 99%  BMI 30.57  kg/m2    Exam:  Gen: Well groomed, pleasant  CARDIAC: RRR  LUNGS: CTA  ABDOMEN: Abdomin NT. Non distended.  EXT: No edema     Results:    Results for RONNIE ARIZMENDI (MRN [id number]) as of 9/14/2018 13:49   Ref. Range 9/4/2018 11:20   Sodium Latest Ref Range: 133 - 144 mmol/L 138   Potassium Latest Ref Range: 3.4 - 5.3 mmol/L 5.3   Chloride Latest Ref Range: 94 - 109 mmol/L 109   Carbon Dioxide Latest Ref Range: 20 - 32 mmol/L 22   Urea Nitrogen Latest Ref Range: 7 - 30 mg/dL 47 (H)   Creatinine Latest Ref Range: 0.66 - 1.25 mg/dL 1.47 (H)   GFR Estimate Latest Ref Range: >60 mL/min/1.7m2 50 (L)   GFR Estimate If Black Latest Ref Range: >60 mL/min/1.7m2 60 (L)   Calcium Latest Ref Range: 8.5 - 10.1 mg/dL 8.4 (L)   Anion Gap Latest Ref Range: 3 - 14 mmol/L 7   Phosphorus Latest Ref Range: 2.5 - 4.5 mg/dL 2.9   Albumin Latest Ref Range: 3.4 - 5.0 g/dL 3.7   Protein Total Latest Ref Range: 6.8 - 8.8 g/dL 6.8   Bilirubin Total Latest Ref Range: 0.2 - 1.3 mg/dL 0.9   Alkaline Phosphatase Latest Ref Range: 40 - 150 U/L 390 (H)   ALT Latest Ref Range: 0 - 70 U/L 78 (H)   AST Latest Ref Range: 0 - 45 U/L 61 (H)   Bilirubin Direct Latest Ref Range: 0.0 - 0.2 mg/dL 0.4 (H)   Glucose Latest Ref Range: 70 - 99 mg/dL 208 (H)   WBC Latest Ref Range: 4.0 - 11.0 10e9/L 3.7 (L)   Hemoglobin Latest Ref Range: 13.3 - 17.7 g/dL 10.9 (L)   Hematocrit Latest Ref Range: 40.0 - 53.0 % 31.4 (L)   Platelet Count Latest Ref Range: 150 - 450 10e9/L 79 (L)   RBC Count Latest Ref Range: 4.4 - 5.9 10e12/L 3.44 (L)   MCV Latest Ref Range: 78 - 100 fl 91   MCH Latest Ref Range: 26.5 - 33.0 pg 31.7   MCHC Latest Ref Range: 31.5 - 36.5 g/dL 34.7   RDW Latest Ref Range: 10.0 - 15.0 % 14.6   Tacrolimus Last Dose Unknown 2100 09/03/2018   Tacrolimus Level Latest Ref Range: 5.0 - 15.0 ug/L 5.4       Cinda Cazares, NP

## 2018-09-14 NOTE — NURSING NOTE
Chief Complaint   Patient presents with     RECHECK     CKD (chronic kidney disease) stage 3     /79  Pulse 77  Temp 98.4  F (36.9  C) (Oral)  Wt 102.2 kg (225 lb 6.4 oz)  SpO2 99%  BMI 30.57 kg/m2  Mattie Todd MA

## 2018-09-14 NOTE — PATIENT INSTRUCTIONS
1. Increase Toprol Xl 100 mg daily  2. We'll call you next week for blood pressure check  3. Continue daily blood pressure checks

## 2018-09-17 ENCOUNTER — OFFICE VISIT (OUTPATIENT)
Dept: INTERNAL MEDICINE | Facility: CLINIC | Age: 53
End: 2018-09-17
Payer: COMMERCIAL

## 2018-09-17 VITALS
WEIGHT: 226.8 LBS | HEIGHT: 70 IN | SYSTOLIC BLOOD PRESSURE: 144 MMHG | HEART RATE: 76 BPM | BODY MASS INDEX: 32.47 KG/M2 | DIASTOLIC BLOOD PRESSURE: 77 MMHG

## 2018-09-17 DIAGNOSIS — M06.9 RHEUMATOID ARTHRITIS INVOLVING MULTIPLE SITES, UNSPECIFIED RHEUMATOID FACTOR PRESENCE: ICD-10-CM

## 2018-09-17 DIAGNOSIS — M15.0 PRIMARY OSTEOARTHRITIS INVOLVING MULTIPLE JOINTS: ICD-10-CM

## 2018-09-17 DIAGNOSIS — D84.9 IMMUNOSUPPRESSION (H): ICD-10-CM

## 2018-09-17 DIAGNOSIS — Z00.00 ROUTINE GENERAL MEDICAL EXAMINATION AT A HEALTH CARE FACILITY: Primary | ICD-10-CM

## 2018-09-17 RX ORDER — OXYCODONE HYDROCHLORIDE 5 MG/1
5 TABLET ORAL EVERY 4 HOURS PRN
Qty: 30 TABLET | Refills: 0 | Status: SHIPPED | OUTPATIENT
Start: 2018-09-17 | End: 2018-11-27

## 2018-09-17 RX ORDER — CYCLOBENZAPRINE HCL 10 MG
10 TABLET ORAL 3 TIMES DAILY PRN
Qty: 90 TABLET | Refills: 3 | Status: SHIPPED | OUTPATIENT
Start: 2018-09-17 | End: 2021-12-27 | Stop reason: ALTCHOICE

## 2018-09-17 NOTE — MR AVS SNAPSHOT
After Visit Summary   9/17/2018    Camacho Bhagat    MRN: [id number]           Patient Information     Date Of Birth          1965        Visit Information        Provider Department      9/17/2018 10:30 AM Shay Kirkpatrick MD [facility]        Today's Diagnoses     Primary osteoarthritis involving multiple joints    -  1    Immunosuppression (H)        Rheumatoid arthritis involving multiple sites, unspecified rheumatoid factor presence (H)           Follow-ups after your visit        Your next 10 appointments already scheduled     Sep 19, 2018 12:30 PM CDT   (Arrive by 12:15 PM)   Diabetes Education with Juanita Peraza RD   [facility] ([facility])    [address] [place] [id number] [phone]           Please bring to your appointment: 1) 2 - 3 day food journal. Write down everything you eat and drink for 2 - 3 days prior to your appointment. 2) Your insurance card. 3) A blood glucose meter. If you do not have one, contact your pharmacy or clinic prior to your appointment. Your pharmacist can assist you with choosing a meter suitable to your insurance needs. 4)  A list of your medications, including prescription, over the counter and herbal. Include dosage and frequency where appropriate.            Nov 19, 2018  9:30 AM CST   (Arrive by 9:15 AM)   RETURN ENDOCRINE with Alisa West MD   [facility] ([facility])    [address] [place] [id number]   [phone]            Dec 05, 2018 12:45 PM CST   (Arrive by 12:30 PM)   Return Liver Transplant with Toñito Camejo MD   [facility] ([facility])    [address] [place] [address]  [address]  [facility] [address]   [phone]            Dec 05, 2018  1:00 PM CST   (Arrive by 12:30 PM)   Return Visit with Cinda Cazares NP   [facility] ([facility]    [address]  [address]  [facility] [address] [phone]              Who to contact     Please call your clinic at [phone] to:    Ask questions about your health    Make or cancel appointments    Discuss your medicines    Learn about your test results    Speak to your doctor            Additional Information About Your Visit        [employer] Information     [employer] gives you secure access to your electronic health record. If you see a primary care provider, you can also send messages to your care team and make appointments. If you have questions, please call your primary care clinic.  If you do not have a primary care provider, please call [phone] and they will assist you.      [employer] is an electronic gateway that provides easy, online access to your medical records. With [employer], you can request a clinic appointment, read your test results, renew a prescription or communicate with your care team.     To access your existing account, please contact your [facility] or call [phone] for assistance.        Care EveryWhere ID     This is your Care EveryWhere ID. This could be used by other organizations to access your [place] medical records  [id number]        Your Vitals Were     Pulse Height BMI (Body Mass Index)             76 1.778 m (5' 10\") 32.54 kg/m2          Blood Pressure from Last 3 Encounters:   09/17/18 144/77   09/14/18 146/79   09/10/18 153/90    Weight from Last 3 Encounters:   09/17/18 102.9 kg (226 lb 12.8 oz)   09/14/18 102.2 kg (225 lb 6.4 oz)   09/10/18 102.5 kg (225 lb 14.4 oz)              Today, you had the following     No orders found for display         Today's Medication Changes          These changes are accurate as of 9/17/18 11:50 AM.  If you have any questions, ask your nurse or doctor.               Start taking these medicines.        Dose/Directions    oxyCODONE IR 5 MG tablet   Commonly known as:  " ROXICODONE   Used for:  Primary osteoarthritis involving multiple joints, Rheumatoid arthritis involving multiple sites, unspecified rheumatoid factor presence (H)   Started by:  Shay Kirkpatrick MD        Dose:  5 mg   Take 1 tablet (5 mg) by mouth every 4 hours as needed for pain maximum 6 tablet(s) per day   Quantity:  30 tablet   Refills:  0         These medicines have changed or have updated prescriptions.        Dose/Directions    amLODIPine 10 MG tablet   Commonly known as:  NORVASC   This may have changed:  when to take this   Used for:  HTN (hypertension)        Dose:  10 mg   Take 1 tablet (10 mg) by mouth daily   Quantity:  90 tablet   Refills:  3       cholecalciferol 1000 UNIT tablet   Commonly known as:  vitamin D3   This may have changed:  when to take this   Used for:  Vitamin D deficiency        Dose:  1000 Units   Take 1 tablet (1,000 Units) by mouth daily   Quantity:  100 tablet   Refills:  3       cyclobenzaprine 10 MG tablet   Commonly known as:  FLEXERIL   This may have changed:  when to take this   Used for:  Immunosuppression (H)        Dose:  10 mg   Take 1 tablet (10 mg) by mouth 3 times daily as needed for muscle spasms   Quantity:  90 tablet   Refills:  3       linagliptin 5 MG Tabs tablet   Commonly known as:  TRADJENTA   This may have changed:  when to take this   Used for:  Type 2 diabetes mellitus with diabetic polyneuropathy, with long-term current use of insulin (H)        Dose:  5 mg   Take 1 tablet (5 mg) by mouth daily   Quantity:  90 tablet   Refills:  3       mycophenolic acid 360 MG EC tablet   Commonly known as:  GENERIC EQUIVALENT   This may have changed:  when to take this   Used for:  History of liver transplant (H), Long-term use of immunosuppressant medication        Dose:  720 mg   Take 2 tablets (720 mg) by mouth every 12 hours   Quantity:  120 tablet   Refills:  3       predniSONE 2.5 MG tablet   Commonly known as:  DELTASONE   This may have changed:  when to take this    Used for:  Liver replaced by transplant (H), Long-term use of immunosuppressant medication        Dose:  2.5 mg   Take 1 tablet (2.5 mg) by mouth daily   Quantity:  90 tablet   Refills:  3            Where to get your medicines      These medications were sent to [id number] - [place], [place] - [address] AT [facility] [address], [place] [address]    Hours:  called pharm.  they do accept faxes Phone:  [phone]     cyclobenzaprine 10 MG tablet         Some of these will need a paper prescription and others can be bought over the counter.  Ask your nurse if you have questions.     Bring a paper prescription for each of these medications     oxyCODONE IR 5 MG tablet               Information about OPIOIDS     PRESCRIPTION OPIOIDS: WHAT YOU NEED TO KNOW   We gave you an opioid (narcotic) pain medicine. It is important to manage your pain, but opioids are not always the best choice. You should first try all the other options your care team gave you. Take this medicine for as short a time (and as few doses) as possible.    Some activities can increase your pain, such as bandage changes or therapy sessions. It may help to take your pain medicine 30 to 60 minutes before these activities. Reduce your stress by getting enough sleep, working on hobbies you enjoy and practicing relaxation or meditation. Talk to your care team about ways to manage your pain beyond prescription opioids.    These medicines have risks:    DO NOT drive when on new or higher doses of pain medicine. These medicines can affect your alertness and reaction times, and you could be arrested for driving under the influence (DUI). If you need to use opioids long-term, talk to your care team about driving.    DO NOT operate heavy machinery    DO NOT do any other dangerous activities while taking these medicines.    DO NOT drink any alcohol while taking these medicines.     If  the opioid prescribed includes acetaminophen, DO NOT take with any other medicines that contain acetaminophen. Read all labels carefully. Look for the word  acetaminophen  or  Tylenol.  Ask your pharmacist if you have questions or are unsure.    You can get addicted to pain medicines, especially if you have a history of addiction (chemical, alcohol or substance dependence). Talk to your care team about ways to reduce this risk.    All opioids tend to cause constipation. Drink plenty of water and eat foods that have a lot of fiber, such as fruits, vegetables, prune juice, apple juice and high-fiber cereal. Take a laxative (Miralax, milk of magnesia, Colace, Senna) if you don t move your bowels at least every other day. Other side effects include upset stomach, sleepiness, dizziness, throwing up, tolerance (needing more of the medicine to have the same effect), physical dependence and slowed breathing.    Store your pills in a secure place, locked if possible. We will not replace any lost or stolen medicine. If you don t finish your medicine, please throw away (dispose) as directed by your pharmacist. The [place] [employer]ution [employer] has more information about safe disposal: https://www.pca.[facility].mn.us/living-green/managing-unwanted-medications         Primary Care Provider Office Phone # Fax #    Shay Kirkpatrick [phone]       [address] [place] [address] [address] [place] [address]        Equal Access to Services     FRANKLIN PORTILLO : Hadrere duttono Sojose, waaxda luqadaha, qaybta kaalmada magdaleno, devi duckworth. So [facility] [phone].    ATENCIÓN: Si habla español, tiene a zheng disposición servicios gratuitos de asistencia lingüística. Shahla al [phone].    We comply with applicable federal civil rights laws and [place] laws. We do not discriminate on the basis of race, color, national origin, age, disability, sex, sexual orientation, or gender identity.             Thank you!     Thank you for choosing [facility]  for your care. Our goal is always to provide you with excellent care. Hearing back from our patients is one way we can continue to improve our services. Please take a few minutes to complete the written survey that you may receive in the mail after your visit with us. Thank you!             Your Updated Medication List - Protect others around you: Learn how to safely use, store and throw away your medicines at www.disposemymeds.org.          This list is accurate as of 9/17/18 11:50 AM.  Always use your most recent med list.                   Brand Name Dispense Instructions for use Diagnosis    amLODIPine 10 MG tablet    NORVASC    90 tablet    Take 1 tablet (10 mg) by mouth daily    HTN (hypertension)       BLOOD GLUCOSE TEST STRIPS Strp     100 each    1 strip by Lancet route 4 times daily    Type 2 diabetes mellitus with diabetic polyneuropathy, with long-term current use of insulin (H)       cholecalciferol 1000 UNIT tablet    vitamin D3    100 tablet    Take 1 tablet (1,000 Units) by mouth daily    Vitamin D deficiency       CIALIS 5 MG tablet   Generic drug:  tadalafil      Take 5 mg by mouth as needed        cyclobenzaprine 10 MG tablet    FLEXERIL    90 tablet    Take 1 tablet (10 mg) by mouth 3 times daily as needed for muscle spasms    Immunosuppression (H)       furosemide 40 MG tablet    LASIX    60 tablet    Take 1 tablet (40 mg) by mouth 2 times daily    Hyperkalemia, Persistent proteinuria, Benign essential hypertension       gabapentin 300 MG capsule    NEURONTIN    270 capsule    TAKE ONE CAPSULE BY MOUTH THREE TIMES DAILY    Low back pain at multiple sites       HumaLOG KWIKpen 100 UNIT/ML injection   Generic drug:  insulin lispro     45 mL    1Unit Novolog per 10 grams CHO for all meals and snacks. Total daily insulin dose up to 45Units.    Type 2 diabetes mellitus not at goal (H)       insulin glargine 100 UNIT/ML injection     LANTUS    18 mL    Inject 50 Units Subcutaneous every morning    Type 2 diabetes mellitus with diabetic polyneuropathy, with long-term current use of insulin (H)       insulin pen needle 32G X 4 MM    BD ENE U/F    100 each    Use 1 pen needles daily or as directed.    Type 2 diabetes mellitus with diabetic polyneuropathy, with long-term current use of insulin (H)       linagliptin 5 MG Tabs tablet    TRADJENTA    90 tablet    Take 1 tablet (5 mg) by mouth daily    Type 2 diabetes mellitus with diabetic polyneuropathy, with long-term current use of insulin (H)       losartan 50 MG tablet    COZAAR    90 tablet    Take 1 tablet (50 mg) by mouth daily    Persistent proteinuria, Hyperkalemia, Benign essential hypertension       metoprolol succinate 100 MG 24 hr tablet    TOPROL-XL    90 tablet    Take 1 tablet (100 mg) by mouth daily    Benign essential hypertension       Multi-vitamin Tabs tablet      Take 1 tablet by mouth every morning        mycophenolic acid 360 MG EC tablet    GENERIC EQUIVALENT    120 tablet    Take 2 tablets (720 mg) by mouth every 12 hours    History of liver transplant (H), Long-term use of immunosuppressant medication       NovoLOG FLEXPEN 100 UNIT/ML injection   Generic drug:  insulin aspart     42 mL    Administer 1 U Novolog per 10 grams CHO for all meals and snacks. Total daily insulin dose up to 45 U.    Type 2 diabetes mellitus with diabetic nephropathy, with long-term current use of insulin (H)       omeprazole 20 MG CR capsule    priLOSEC    60 capsule    Take 1 capsule (20 mg) by mouth 2 times daily    Long-term use of immunosuppressant medication, History of liver transplant (H)       oxyCODONE IR 5 MG tablet    ROXICODONE    30 tablet    Take 1 tablet (5 mg) by mouth every 4 hours as needed for pain maximum 6 tablet(s) per day    Primary osteoarthritis involving multiple joints, Rheumatoid arthritis involving multiple sites, unspecified rheumatoid factor presence (H)        patiromer 8.4 g packet    VELTASSA    180 each    Take 16.8 g by mouth daily    Hyperkalemia       predniSONE 2.5 MG tablet    DELTASONE    90 tablet    Take 1 tablet (2.5 mg) by mouth daily    Liver replaced by transplant (H), Long-term use of immunosuppressant medication       tacrolimus 1 MG capsule    GENERIC EQUIVALENT    330 capsule    Take 6mg (6capsules) every morning, and take 5mg (5capsules) every evening, take every 12 hours.    Liver transplant recipient (H)       ursodiol 500 MG Tabs     180 tablet    Take 500 mg by mouth 2 times daily    Liver replaced by transplant (H)

## 2018-09-17 NOTE — PROGRESS NOTES
Internal Medicine Clinic Visit Note, Annual Physical  09/17/18    ASSESSMENT/PLAN:  Camacho Bhagat is a 52 year old male with a complex medical history of CASTAÑEDA cirrhosis s/p liver transplant 3/2017 c/b acute mild cellular rejection 6/2018, type 2 diabetes, CKD3, HTN, hyperkalemia, rheumatoid arthritis and osteoarthritis. Overall doing well. Follows with nephrology, endocrinology and GI for chronic health conditions. Up to date on health maintenance (see below) aside from influenza vaccine for upcoming season. Changes to toenail of left hallux more consistent with trauma than onychomycosis, recommend continued monitoring at this point, patient to call or RTC if symptoms worsen or do not resolve.     Plan:    # Primary osteoarthritis involving multiple joints  # Rheumatoid arthritis involving multiple sites, unspecified rheumatoid factor presence (H)  Stable overall by history and exam today, but symptoms typically worsen over winter months. Refills provided today for use with increased symptoms.  - oxyCODONE IR (ROXICODONE) 5 MG tablet; Take 1 tablet (5 mg) by mouth every 4 hours as needed for pain maximum 6 tablet(s) per day  Dispense: 30 tablet; Refill: 0  - cyclobenzaprine (FLEXERIL) 10 MG tablet; Take 1 tablet (10 mg) by mouth 3 times daily as needed for muscle spasms  Dispense: 90 tablet; Refill: 3    # Routine Health Maintenence:  - Immunizations (zoster, pneumovax, flu, Tdap, Hep A/B): pt interested in flu shot today, not available in clinic yet, recommended pt RTC in mid-October or visit a retail clinic sooner if desired    Most Recent Immunizations   Administered Date(s) Administered     HepB 04/14/2016     Influenza (IIV3) PF 02/07/2014     Influenza Vaccine IM 3yrs+ 4 Valent IIV4 10/02/2017     Mantoux Tuberculin Skin Test 03/01/2004     Pneumo Conj 13-V (2010&after) 12/20/2016     Pneumococcal 23 valent 10/05/2015     TD (ADULT, 7+) 02/17/2004     TDAP Vaccine (Boostrix) 09/07/2012     - Lipids:   Recent  Labs   Lab Test  04/02/18   1042  02/26/18   1111   07/03/12   0710   CHOL  97  117   < >  133   HDL  36*  30*   < >  35*   LDL  39  29   < >  62   TRIG  109  293*   < >  182*   CHOLHDLRATIO   --    --    --   3.8    < > = values in this interval not displayed.     - PSA (50-75 yrs):   PSA   Date Value Ref Range Status   02/08/2016 0.36 0 - 4 ug/L Final      - Colonoscopy (50-75 yrs): Next due in 2021. Colonoscopy completed 7/2017 not adequate for screening due to large stool burden. Prior colonoscopy 8/2016 with 5 mm polyp in the rectum, benign by pathology.  - HIV/HCV if risk factors: HCV nonreactive 3/2017, HIV antigen/Ab combo nonreactive 3/2017  - Advanced Directive: Yes, will ask him to bring a copy at his next visit    Pt was seen and discussed with Dr. Kirkpatrick.    Sanjuanita Michaels, MS3  Pager: [phone]    I, Shay Kirkpatrick, was present with the medical student who participated in the service  and in the documentation of the note.  I have verified the history and personally performed the physical exam and medical decision making.  I agree with the assessment and plan of care as documented in the note.          SUBJECTIVE:    Patient in clinic today for annual physical. Overall doing well. Dropped a heavy object on his left big toe a couple of months ago, nail turned white and now is more brown, and end is brittle, no pain or itching. Concerned about toenail fungus given immunosuppressed state. Has not tried any OTC remedies. Home BPs have been 120-140/70-80s. Sugars at home in the AM run , unsure of average. Used to take oxycodone for osteoarthritis, and currently uses Flexeril prn for muscle spasms. Would like a refill on both of these as joint pain is worse in the winter. Also, interested in getting a flu shot today.    1.5 years post liver transplantation, course complicated by surgical complications and delayed ileostomy takedown. Last seen by GI 9/5/18 (Dr. Camejo). Started ursodiol 500 mg BID. Liver  tests improved post-transplant, elevated Alk phos and transaminases but no acute findings on MRCP. Liver biopsy 6/2018 showed mild cellular rejection, Tacrolimus dose increased to 11 mg total daily. Next follow up scheduled in 3 months. Per pt, tolerating medications well now.    CKD, HTN and hyperkalemia managed by nephrology (Dr. Cazares), last seen in clinic 9/14/18, Per clinic note in EMR, Toprol XL increased to 100 mg daily. BP goal is <130/80, no at goal. Continue Lasix 40 mg BID, Amlodipine 10 mg daily, Losartan 50 mg daily. Per pt, hasn't started increased dose of Toprol at home yet. Thinks the Veltassa he takes for hyperkalemia contributes to HTN.    Diabetes managed by endocrinology (Dr. West), last seen 9/10/18. Per clinic note in EMR, A1c goal is 7%, laboratory readings less reliable in the context of anemia. Switched to NovoLog to 1 unit(s)/10 gr carbs for meals and snacks except when followed by physical activity, discontinued Tradjenta, continuing to take Lantis. Referred for retinopathy exam, completed outside of the system, mild retinal changes and cataracts. History of neuropathy with paraesthesia and h/o gangrene associated with critical illness, had a normal foot exam 4/2018. Monitoring LDLs, not currently taking a statin.     ROS:  Gen: no fevers, night sweats or weight change  Eyes: no vision change  ENT: no hearing change, no nasal congestion or rhinorrhea, no oral lesions  Card: no chest pain, palpitations, or dyspnea with exertion  Resp: no dyspnea, cough, or shortness of breath  GI: no nausea, vomiting, diarrhea or constipation, no abdominal pain  : no change in urine, hematuria, dysuria  MSK: no joint or muscle pain or swelling  Skin: no concerning lesions or moles  Neuro: no loss of strength or sensation, no numbness or tingling, no tremor, no headaches  Heme/Lymph: no concerning bumps, no bleeding problems  Psych: no depression or anxiety    Patient Active Problem List   Diagnosis      Carpal tunnel syndrome     Testicular hypofunction     Fibromyalgia     Sicca syndrome (H)     Medication refill- do not delete      Hepatocellular carcinoma (H)     Osteoarthritis     Rheumatoid arthritis (H)     Hyperkalemia     Liver transplant recipient (H)     Immunosuppressed status (H)     Neutropenic colitis (H)     S/P liver transplant (H)     Pancytopenia (H)     Gangrene of finger (H)     Ischemia of both lower extremities     Elevated serum creatinine     History of creation of ostomy     Peristomal skin complication     Painful periwound skin     Encounter for attention to ileostomy (H)     Ileostomy in place (H)     Abscess, intra-abdominal, postoperative (H)     Liver transplant rejection (H)     CMV colitis (H)     Type 2 diabetes mellitus with diabetic nephropathy, with long-term current use of insulin (H)       Past Medical History:   Diagnosis Date     Depressive disorder, not elsewhere classified      Diabetes type 2, controlled (H) 11/10/2016     Esophageal reflux      Fibromyalgia 1/2009    dx with Dr Benitez( Rheum)     Gangrene of finger (H) 8/25/2017     H/O deep venous thrombosis 11/2001    Permanent IVC filter in place.     H/O CASTAÑEDA (nonalcoholic steatohepatitis)      H/O Pneumonia, organism unspecified(486) 10/2001    Included ARDS, sepsis, and  acute renal failure; hospitalized, required tracheostomy placement.     H/O: HTN (hypertension) 11/2001    No longer prescribed antihypertensive medication.       History of CVA (cerebrovascular accident)      History of hepatocellular carcinoma      History of liver transplant (H)      History of obstructive sleep apnea     No longer wears CPAP since losing approximately 200 pounds with his liver transplant and its complications.       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      Osteoarthritis      Presence of PERMANENT IVC filter      Rheumatoid arthritis(714.0)        Past Surgical History:   Procedure Laterality Date     BENCH LIVER N/A 3/4/2017    Procedure: BENCH LIVER;  Surgeon: Jovan Tran MD;  [facility]     C TOTAL KNEE ARTHROPLASTY  2008    Right knee arthroscopy     CHOLECYSTECTOMY       COLONOSCOPY N/A 7/21/2017    Procedure: COMBINED COLONOSCOPY, SINGLE OR MULTIPLE BIOPSY/POLYPECTOMY BY BIOPSY;  Colonoscopy;  Surgeon: Izaiah Montes MD;  [facility]     ENDOSCOPIC RETROGRADE CHOLANGIOPANCREATOGRAM N/A 5/22/2018    Procedure: COMBINED ENDOSCOPIC RETROGRADE CHOLANGIOPANCREATOGRAPHY, PLACE TUBE/STENT;  Endoscopic Retrograde Cholangiopancreatography with sphincterotomy and pancreatic duct stent placement;  Surgeon: Zay Gaitan MD;  [facility]     ENT SURGERY      Repair of deviated septum     ESOPHAGOSCOPY, GASTROSCOPY, DUODENOSCOPY (EGD), COMBINED N/A 8/4/2016    Procedure: COMBINED ESOPHAGOSCOPY, GASTROSCOPY, DUODENOSCOPY (EGD), BIOPSY SINGLE OR MULTIPLE;  Surgeon: Trent Pederson MD;  Location:  GI     ESOPHAGOSCOPY, GASTROSCOPY, DUODENOSCOPY (EGD), COMBINED N/A 8/27/2017    Procedure: COMBINED ESOPHAGOSCOPY, GASTROSCOPY, DUODENOSCOPY (EGD);;  Surgeon: Los Wynn MD;  Location:  GI     INCISION AND DRAINAGE ABDOMEN WASHOUT, COMBINED Right 2/14/2018    Procedure: COMBINED INCISION AND DRAINAGE ABDOMEN WASHOUT;  COMBINED INCISION AND DRAINAGE right ABDOMEN flank;  Surgeon: Sara Dinh MD;  [facility]     LAPAROTOMY EXPLORATORY N/A 7/4/2017    Procedure: LAPAROTOMY EXPLORATORY;  Exploratory Laparotomy, washout;  Surgeon: Tip Zhang MD;  [facility]     LAPAROTOMY EXPLORATORY N/A 7/5/2017    Procedure: LAPAROTOMY EXPLORATORY;  Exploratory Laparotomy, Washout with closure.;  Surgeon: Tip Zhang MD;  [facility]     LAPAROTOMY EXPLORATORY N/A 7/26/2017    Procedure:  LAPAROTOMY EXPLORATORY;  Exploratory Laparotomy, Right angelique-colectomy, end ileostomy, mucosal fistula, partial omentectomy;  Surgeon: Sara Dinh MD;  [facility]     ORTHOPEDIC SURGERY Right     Repair of dislocated wrist.       RELEASE TRIGGER FINGER Right 11/10/2017    Procedure: RELEASE TRIGGER FINGER;  Debride, V-Y Flap Right Index Finger;  Surgeon: Momo Noonan MD;  [facility]     TAKEDOWN ILEOSTOMY N/A 2018    Procedure: TAKEDOWN ILEOSTOMY;  Exploratory Laparotomy, Lysis of Adhesions, Takedown Of End Ileostomy, Takedown of mucocutaneous fistula, ileocolic resection  And Colorectal Anastomosis, Primary repair of Ventral Hernia, Anesthesia Block ;  Surgeon: Sara Dinh MD;  [facility]     TRACHEOSTOMY  2001    During hospitalization for pneumonia.       TRANSPLANT LIVER RECIPIENT  DONOR N/A 3/4/2017    Procedure: TRANSPLANT LIVER RECIPIENT  DONOR;  Surgeon: Jovan Tran MD;  [facility]       Family History   Problem Relation Age of Onset     Diabetes Father      Hypertension Father      Substance Abuse Father      Arthritis Mother      Thyroid Cancer Mother      Survivor!     Cervical Cancer Maternal Grandmother      Cerebrovascular Disease Maternal Grandfather      No Known Problems Paternal Grandmother      Prostate Cancer Paternal Grandfather      Colon Cancer No family hx of      Hyperlipidemia No family hx of      Coronary Artery Disease No family hx of      Breast Cancer No family hx of        Social History     Social History     Marital status:      Spouse name: N/A     Number of children: 1     Years of education: N/A     Occupational History            Social History Main Topics     Smoking status: Former Smoker     Packs/day: 0.25     Years: 2.00     Types: Cigarettes     Quit date:      Smokeless tobacco: Former User     Types: Chew     Quit date: 10/8/2015     Alcohol use No       "Comment: No alcohol since liver transplant.       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           Health Maintenance   Topic Date Due     URINE DRUG SCREEN Q1 YR  10/03/1979     FOOT EXAM Q1 YEAR  02/19/2011     INFLUENZA VACCINE (1) 09/01/2018     A1C Q6 MO  10/11/2018     MICROALBUMIN Q1 YEAR  10/27/2018     LIPID MONITORING Q1 YEAR  04/02/2019     PHQ-2 Q1 YR  04/04/2019     EYE EXAM Q1 YEAR  08/04/2019     CREATININE Q1 YEAR  09/04/2019     TSH W/ FREE T4 REFLEX Q2 YEAR  04/02/2020     TETANUS Q10 YR  09/07/2022     COLON CANCER SCREEN (SYSTEM ASSIGNED)  07/21/2027     PNEUMOVAX 1X HI RISK PATIENT < 65 (NO IB MSG)  Completed     HIV SCREEN (SYSTEM ASSIGNED)  Completed     HEPATITIS C SCREENING  Completed         OBJECTIVE:  Vitals: /77  Pulse 76  Ht 1.778 m (5' 10\")  Wt 102.9 kg (226 lb 12.8 oz)  BMI 32.54 kg/m2  Constitutional: alert, oriented, pleasant, no acute distress  Head: normocephalic, atraumatic  Eyes: EOM grossly intact, no scleral icterus  Neck: supple, no lymphadenopathy, no thyromegaly, no JVD  Card: RRR, no murmurs, rubs or gallops  Resp: good air movement b/l, lungs CTAB, no wheezes/rales/rhonchi  GI: soft, NTND, no organomegaly or masses, no rebound/guarding  MSK: no edema, normal muscle tone  Neuro: AOx3, cranial nerves 2-12 grossly intact, strength 5/5 throughout  Skin: no rashes/lesions on visible skin. Toenail on left hallux brittle with mild white-brown discoloration, no signs of inflammation, debris under nail bed, nail thickening, or detachment of nail plate from bed  Psychiatric: normal mentation, affect and mood    Labs: Reviewed in Epic    Imaging: Reviewed in Epic          "

## 2018-09-18 ENCOUNTER — HOSPITAL ENCOUNTER (OUTPATIENT)
Dept: LAB | Facility: CLINIC | Age: 53
Discharge: HOME | End: 2018-09-18
Attending: INTERNAL MEDICINE
Payer: COMMERCIAL

## 2018-09-18 DIAGNOSIS — Z94.4 LIVER REPLACED BY TRANSPLANT (H): ICD-10-CM

## 2018-09-18 DIAGNOSIS — N18.30 CKD (CHRONIC KIDNEY DISEASE) STAGE 3, GFR 30-59 ML/MIN (H): ICD-10-CM

## 2018-09-18 LAB
ALBUMIN SERPL-MCNC: 4 G/DL (ref 3.4–5)
ALP SERPL-CCNC: 310 U/L (ref 40–150)
ALT SERPL W P-5'-P-CCNC: 66 U/L (ref 0–70)
ANION GAP SERPL CALCULATED.3IONS-SCNC: 5 MMOL/L (ref 3–14)
AST SERPL W P-5'-P-CCNC: 44 U/L (ref 0–45)
BILIRUB DIRECT SERPL-MCNC: 0.3 MG/DL (ref 0–0.2)
BILIRUB SERPL-MCNC: 0.8 MG/DL (ref 0.2–1.3)
BUN SERPL-MCNC: 46 MG/DL (ref 7–30)
CALCIUM SERPL-MCNC: 8.5 MG/DL (ref 8.5–10.1)
CHLORIDE SERPL-SCNC: 108 MMOL/L (ref 94–109)
CO2 SERPL-SCNC: 24 MMOL/L (ref 20–32)
CREAT SERPL-MCNC: 1.49 MG/DL (ref 0.66–1.25)
CREAT UR-MCNC: 98 MG/DL
ERYTHROCYTE [DISTWIDTH] IN BLOOD BY AUTOMATED COUNT: 14.7 % (ref 10–15)
GFR SERPL CREATININE-BSD FRML MDRD: 49 ML/MIN/1.7M2
GLUCOSE SERPL-MCNC: 278 MG/DL (ref 70–99)
HCT VFR BLD AUTO: 32 % (ref 40–53)
HGB BLD-MCNC: 10.6 G/DL (ref 13.3–17.7)
MCH RBC QN AUTO: 31.2 PG (ref 26.5–33)
MCHC RBC AUTO-ENTMCNC: 33.1 G/DL (ref 31.5–36.5)
MCV RBC AUTO: 94 FL (ref 78–100)
PHOSPHATE SERPL-MCNC: 3.6 MG/DL (ref 2.5–4.5)
PLATELET # BLD AUTO: 73 10E9/L (ref 150–450)
POTASSIUM SERPL-SCNC: 5.4 MMOL/L (ref 3.4–5.3)
PROT SERPL-MCNC: 7.2 G/DL (ref 6.8–8.8)
PROT UR-MCNC: 0.27 G/L
PROT/CREAT 24H UR: 0.28 G/G CR (ref 0–0.2)
PTH-INTACT SERPL-MCNC: 28 PG/ML (ref 18–80)
RBC # BLD AUTO: 3.4 10E12/L (ref 4.4–5.9)
SODIUM SERPL-SCNC: 137 MMOL/L (ref 133–144)
TACROLIMUS BLD-MCNC: 8 UG/L (ref 5–15)
TME LAST DOSE: NORMAL H
WBC # BLD AUTO: 3.7 10E9/L (ref 4–11)

## 2018-09-18 PROCEDURE — 82306 VITAMIN D 25 HYDROXY: CPT

## 2018-09-18 PROCEDURE — 84460 ALANINE AMINO (ALT) (SGPT): CPT

## 2018-09-18 PROCEDURE — 82248 BILIRUBIN DIRECT: CPT

## 2018-09-18 PROCEDURE — 80197 ASSAY OF TACROLIMUS: CPT | Performed by: INTERNAL MEDICINE

## 2018-09-18 PROCEDURE — 84155 ASSAY OF PROTEIN SERUM: CPT

## 2018-09-18 PROCEDURE — 80069 RENAL FUNCTION PANEL: CPT

## 2018-09-18 PROCEDURE — 84450 TRANSFERASE (AST) (SGOT): CPT

## 2018-09-18 PROCEDURE — 80076 HEPATIC FUNCTION PANEL: CPT

## 2018-09-18 PROCEDURE — 83970 ASSAY OF PARATHORMONE: CPT

## 2018-09-18 PROCEDURE — 85027 COMPLETE CBC AUTOMATED: CPT

## 2018-09-18 PROCEDURE — 84156 ASSAY OF PROTEIN URINE: CPT

## 2018-09-18 PROCEDURE — 84075 ASSAY ALKALINE PHOSPHATASE: CPT

## 2018-09-18 PROCEDURE — 82247 BILIRUBIN TOTAL: CPT

## 2018-09-18 PROCEDURE — 36415 COLL VENOUS BLD VENIPUNCTURE: CPT

## 2018-09-19 ENCOUNTER — TELEPHONE (OUTPATIENT)
Dept: NEPHROLOGY | Facility: CLINIC | Age: 53
End: 2018-09-19

## 2018-09-19 ENCOUNTER — ALLIED HEALTH/NURSE VISIT (OUTPATIENT)
Dept: EDUCATION SERVICES | Facility: CLINIC | Age: 53
End: 2018-09-19
Payer: COMMERCIAL

## 2018-09-19 VITALS — BODY MASS INDEX: 32.05 KG/M2 | WEIGHT: 223.4 LBS

## 2018-09-19 DIAGNOSIS — E11.9 DIABETES MELLITUS WITHOUT COMPLICATION (H): Primary | ICD-10-CM

## 2018-09-19 LAB — DEPRECATED CALCIDIOL+CALCIFEROL SERPL-MC: 43 UG/L (ref 20–75)

## 2018-09-19 NOTE — MR AVS SNAPSHOT
After Visit Summary   9/19/2018    Camacho Bhagat    MRN: [id number]           Patient Information     Date Of Birth          1965        Visit Information        Provider Department      9/19/2018 12:30 PM Juanita Peraza RD [facility]        Care Instructions    1. We reviewed carbohydrate counting today. Continue to use 1 unit Novolog per 10 grams carbohydrate.   2. Check your blood sugar 4 times per day, before breakfast, lunch, dinner, bedtime to see if you need additional correction Novolog at those times, in addition to your carbohydrates consumed.  3. We reviewed low blood sugar treatment today. Always carry carbohydrate with you when away from home and exercising  4. When you work out at the gym, check your blood sugar before you start exercising. You may need 15-30 grams of carbohydrate per 30 minutes to 1 hour of continuous exercise to prevent low bg when exercising.   5. Follow up as needed.   Juanita Peraza RD, LD, CDE  Diabetes Care  [address]  [address]  [place]  [address]  Phone: [phone]  Appointment line: [phone]  Email: [email or web address]              Follow-ups after your visit        Your next 10 appointments already scheduled     Nov 19, 2018  9:30 AM CST   (Arrive by 9:15 AM)   RETURN ENDOCRINE with Alisa West MD   [facility] ([facility])    [address] [place] [address] [id number] [phone]            Dec 05, 2018 12:45 PM CST   (Arrive by 12:30 PM)   Return Liver Transplant with Toñito Camejo MD   [facility] Hepat[facility] ([facility])    [address] [place] [address]  [address] [place] [address] [id number] [phone]            Dec 05, 2018  1:00 PM CST   (Arrive by 12:30 PM)   Return Visit with Cinda Cazares NP   [facility] Nephrology ([facility])    [address] [place] [address]  [address]  [facility] [id number] [phone]              Who to contact     Please call your clinic at [phone] to:    Ask questions about your health    Make or cancel appointments    Discuss your medicines    Learn about your test results    Speak to your doctor            Additional Information About Your Visit        MyChart Information     [facility] gives you secure access to your electronic health record. If you see a primary care provider, you can also send messages to your care team and make appointments. If you have questions, please call your primary care clinic.  If you do not have a primary care provider, please call [phone] and they will assist you.      [employer] is an electronic gateway that provides easy, online access to your medical records. With [employer], you can request a clinic appointment, read your test results, renew a prescription or communicate with your care team.     To access your existing account, please contact your [facility] or call [phone] for assistance.        Care EveryWhere ID     This is your Care EveryWhere ID. This could be used by other organizations to access your [place] medical records  [id number]         Blood Pressure from Last 3 Encounters:   09/17/18 144/77   09/14/18 146/79   09/10/18 153/90    Weight from Last 3 Encounters:   09/17/18 102.9 kg (226 lb 12.8 oz)   09/14/18 102.2 kg (225 lb 6.4 oz)   09/10/18 102.5 kg (225 lb 14.4 oz)              Today, you had the following     No orders found for display         Today's Medication Changes          These changes are accurate as of 9/19/18  1:42 PM.  If you have any questions, ask your nurse or doctor.               These medicines have changed or have updated prescriptions.        Dose/Directions    amLODIPine 10 MG tablet   Commonly known as:  NORVASC   This may have changed:  when to take this   Used for:  HTN (hypertension)        Dose:  10 mg   Take 1 tablet (10 mg) by  mouth daily   Quantity:  90 tablet   Refills:  3       cholecalciferol 1000 UNIT tablet   Commonly known as:  vitamin D3   This may have changed:  when to take this   Used for:  Vitamin D deficiency        Dose:  1000 Units   Take 1 tablet (1,000 Units) by mouth daily   Quantity:  100 tablet   Refills:  3       linagliptin 5 MG Tabs tablet   Commonly known as:  TRADJENTA   This may have changed:  when to take this   Used for:  Type 2 diabetes mellitus with diabetic polyneuropathy, with long-term current use of insulin (H)        Dose:  5 mg   Take 1 tablet (5 mg) by mouth daily   Quantity:  90 tablet   Refills:  3       mycophenolic acid 360 MG EC tablet   Commonly known as:  GENERIC EQUIVALENT   This may have changed:  when to take this   Used for:  History of liver transplant (H), Long-term use of immunosuppressant medication        Dose:  720 mg   Take 2 tablets (720 mg) by mouth every 12 hours   Quantity:  120 tablet   Refills:  3       predniSONE 2.5 MG tablet   Commonly known as:  DELTASONE   This may have changed:  when to take this   Used for:  Liver replaced by transplant (H), Long-term use of immunosuppressant medication        Dose:  2.5 mg   Take 1 tablet (2.5 mg) by mouth daily   Quantity:  90 tablet   Refills:  3                Primary Care Provider Office Phone # Fax #    Shay Kirkpatrick [phone]       [address] [place] [address] [address] [place] [address]        Equal Access to Services     FRANKLIN PORTILLO AH: Hadii tavo duttono Sojose, waaxda luqadaha, qaybta kaalmada adeegmelodyda, dvei duckworth. So [facility] [phone].    ATENCIÓN: Si habla español, tiene a zheng disposición servicios gratuitos de asistencia lingüística. Llame al [phone].    We comply with applicable federal civil rights laws and [place] laws. We do not discriminate on the basis of race, color, national origin, age, disability, sex, sexual orientation, or gender identity.            Thank you!      Thank you for choosing [facility]  for your care. Our goal is always to provide you with excellent care. Hearing back from our patients is one way we can continue to improve our services. Please take a few minutes to complete the written survey that you may receive in the mail after your visit with us. Thank you!             Your Updated Medication List - Protect others around you: Learn how to safely use, store and throw away your medicines at www.disposemymeds.org.          This list is accurate as of 9/19/18  1:42 PM.  Always use your most recent med list.                   Brand Name Dispense Instructions for use Diagnosis    amLODIPine 10 MG tablet    NORVASC    90 tablet    Take 1 tablet (10 mg) by mouth daily    HTN (hypertension)       BLOOD GLUCOSE TEST STRIPS Strp     100 each    1 strip by Lancet route 4 times daily    Type 2 diabetes mellitus with diabetic polyneuropathy, with long-term current use of insulin (H)       cholecalciferol 1000 UNIT tablet    vitamin D3    100 tablet    Take 1 tablet (1,000 Units) by mouth daily    Vitamin D deficiency       CIALIS 5 MG tablet   Generic drug:  tadalafil      Take 5 mg by mouth as needed        cyclobenzaprine 10 MG tablet    FLEXERIL    90 tablet    Take 1 tablet (10 mg) by mouth 3 times daily as needed for muscle spasms    Immunosuppression (H)       furosemide 40 MG tablet    LASIX    60 tablet    Take 1 tablet (40 mg) by mouth 2 times daily    Hyperkalemia, Persistent proteinuria, Benign essential hypertension       gabapentin 300 MG capsule    NEURONTIN    270 capsule    TAKE ONE CAPSULE BY MOUTH THREE TIMES DAILY    Low back pain at multiple sites       HumaLOG KWIKpen 100 UNIT/ML injection   Generic drug:  insulin lispro     45 mL    1Unit Novolog per 10 grams CHO for all meals and snacks. Total daily insulin dose up to 45Units.    Type 2 diabetes mellitus not at goal (H)       insulin glargine 100 UNIT/ML injection    LANTUS    18 mL    Inject  50 Units Subcutaneous every morning    Type 2 diabetes mellitus with diabetic polyneuropathy, with long-term current use of insulin (H)       insulin pen needle 32G X 4 MM    BD ENE U/F    100 each    Use 1 pen needles daily or as directed.    Type 2 diabetes mellitus with diabetic polyneuropathy, with long-term current use of insulin (H)       linagliptin 5 MG Tabs tablet    TRADJENTA    90 tablet    Take 1 tablet (5 mg) by mouth daily    Type 2 diabetes mellitus with diabetic polyneuropathy, with long-term current use of insulin (H)       losartan 50 MG tablet    COZAAR    90 tablet    Take 1 tablet (50 mg) by mouth daily    Persistent proteinuria, Hyperkalemia, Benign essential hypertension       metoprolol succinate 100 MG 24 hr tablet    TOPROL-XL    90 tablet    Take 1 tablet (100 mg) by mouth daily    Benign essential hypertension       Multi-vitamin Tabs tablet      Take 1 tablet by mouth every morning        mycophenolic acid 360 MG EC tablet    GENERIC EQUIVALENT    120 tablet    Take 2 tablets (720 mg) by mouth every 12 hours    History of liver transplant (H), Long-term use of immunosuppressant medication       NovoLOG FLEXPEN 100 UNIT/ML injection   Generic drug:  insulin aspart     42 mL    Administer 1 U Novolog per 10 grams CHO for all meals and snacks. Total daily insulin dose up to 45 U.    Type 2 diabetes mellitus with diabetic nephropathy, with long-term current use of insulin (H)       omeprazole 20 MG CR capsule    priLOSEC    60 capsule    Take 1 capsule (20 mg) by mouth 2 times daily    Long-term use of immunosuppressant medication, History of liver transplant (H)       oxyCODONE IR 5 MG tablet    ROXICODONE    30 tablet    Take 1 tablet (5 mg) by mouth every 4 hours as needed for pain maximum 6 tablet(s) per day    Primary osteoarthritis involving multiple joints, Rheumatoid arthritis involving multiple sites, unspecified rheumatoid factor presence (H)       patiromer 8.4 g packet     VELTASSA    180 each    Take 16.8 g by mouth daily    Hyperkalemia       predniSONE 2.5 MG tablet    DELTASONE    90 tablet    Take 1 tablet (2.5 mg) by mouth daily    Liver replaced by transplant (H), Long-term use of immunosuppressant medication       tacrolimus 1 MG capsule    GENERIC EQUIVALENT    330 capsule    Take 6mg (6capsules) every morning, and take 5mg (5capsules) every evening, take every 12 hours.    Liver transplant recipient (H)       ursodiol 500 MG Tabs     180 tablet    Take 500 mg by mouth 2 times daily    Liver replaced by transplant (H)

## 2018-09-19 NOTE — PATIENT INSTRUCTIONS
1. We reviewed carbohydrate counting today. Continue to use 1 unit Novolog per 10 grams carbohydrate.   2. Check your blood sugar 4 times per day, before breakfast, lunch, dinner, bedtime to see if you need additional correction Novolog at those times, in addition to your carbohydrates consumed.  3. We reviewed low blood sugar treatment today. Always carry carbohydrate with you when away from home and exercising  4. When you work out at the gym, check your blood sugar before you start exercising. You may need 15-30 grams of carbohydrate per 30 minutes to 1 hour of continuous exercise to prevent low bg when exercising.   5. Follow up as needed.   Juanita Peraza RD, REFUGIO, CDE  Diabetes [facility]  [address]  [address]  [address]  Phone: [phone]  Appointment line: [phone]  Email: [email or web address]

## 2018-09-19 NOTE — TELEPHONE ENCOUNTER
Call to patient regarding message below per Jovanna Cazares NP. Left Vm. Provided number for call back.  Cele Mancilla LPN  Nephrology  [phone]

## 2018-09-19 NOTE — TELEPHONE ENCOUNTER
----- Message from Cele Mancilla LPN sent at 9/14/2018  2:07 PM CDT -----      ----- Message -----     From: Cinda Cazares NP     Sent: 9/14/2018   2:04 PM       To: EVERETT Jones  Could you call Camacho next week for blood pressure check?  I increased his Toprol to 100 mg daily  thanks

## 2018-09-19 NOTE — PROGRESS NOTES
Medical Nutrition Therapy for Diabetes  Visit Type:Initial assessment and intervention    Camacho Bhagat presents today for MNT and education related to type 2 diabetes.   He is accompanied by self.     ASSESSMENT:   Patient comments/concerns relating to nutrition: Camacho is here today per Dr. West for type 2 diabetes/ nutrition/carb counting review. I have read his PMH. He recently started taking NOvolog insulin per Dr. West at 1 unit/10 grams carb. He is single, lives alone, shops/cooks for self. He is a former  and RN. HE did not bring his bg meter today, he states he checks his bg 3x/day.    NUTRITION HISTORY:    Breakfast: egg whites or egg/ turkey billy, sometimes pork sausage or billy, whole wheat english muffin or sourdough bread, 2 cups skim milk, sometimes fruit  Lunch: carrots/celery/hummus/naan, sometimes cheese, fruit, nuts, milk  Dinner: lean chicken/turkey/pork/beef/fish/seafood, potato or sweet potato or whole wheat pasta/brown rice, veg or homemade Lebanese/Asian/Mexican food or homemade or frozen pizza  Snacks: veggies/hummus/naan  Beverages: water, coffee, sometimes spiced pumpkin latte at [employer] or [employer]    Misses meals? Snacks only for lunch  Eats out:  never     Previous diet education:  Yes     Food allergies/intolerances: none      EXERCISE: walks dog 2-3 miles per day. Plans to get back to the gym soon. Used to go to the gym regularly prior to liver transplant    SOCIO/ECONOMIC:   Lives with: self    BLOOD GLUCOSE MONITORING:  Patient glucose self monitoring as follows: three times daily.   BG meter: One Touch Verio meter  BG results: not available     BG values are: unable to assess  Patient's most recent   Lab Results   Component Value Date    A1C 5.5 04/11/2018    is meeting goal of <7.0    MEDICATIONS:  Current Outpatient Prescriptions   Medication     amLODIPine (NORVASC) 10 MG tablet     cholecalciferol (VITAMIN D3) 1000 UNIT tablet     CIALIS 5 MG tablet      cyclobenzaprine (FLEXERIL) 10 MG tablet     furosemide (LASIX) 40 MG tablet     gabapentin (NEURONTIN) 300 MG capsule     Glucose Blood (BLOOD GLUCOSE TEST STRIPS) STRP     HUMALOG KWIKPEN 100 UNIT/ML soln     insulin glargine (LANTUS) 100 UNIT/ML injection     insulin pen needle (BD ENE U/F) 32G X 4 MM     linagliptin (TRADJENTA) 5 MG TABS tablet     losartan (COZAAR) 50 MG tablet     metoprolol succinate (TOPROL-XL) 100 MG 24 hr tablet     multivitamin, therapeutic with minerals (MULTI-VITAMIN) TABS tablet     mycophenolic acid (GENERIC EQUIVALENT) 360 MG EC tablet     NOVOLOG FLEXPEN 100 UNIT/ML soln     omeprazole (PRILOSEC) 20 MG CR capsule     oxyCODONE IR (ROXICODONE) 5 MG tablet     patiromer (VELTASSA) 8.4 g packet     predniSONE (DELTASONE) 2.5 MG tablet     tacrolimus (GENERIC EQUIVALENT) 1 MG capsule     ursodiol 500 MG TABS     No current facility-administered medications for this visit.        LABS:  Lab Results   Component Value Date    A1C 5.5 04/11/2018     Lab Results   Component Value Date     09/18/2018     Lab Results   Component Value Date    LDL 39 04/02/2018     HDL Cholesterol   Date Value Ref Range Status   04/02/2018 36 (L) >39 mg/dL Final   ]  GFR Estimate   Date Value Ref Range Status   09/18/2018 49 (L) >60 mL/min/1.7m2 Final     Comment:     Non  GFR Calc     GFR Estimate If Black   Date Value Ref Range Status   09/18/2018 59 (L) >60 mL/min/1.7m2 Final     Comment:      GFR Calc     Lab Results   Component Value Date    CR 1.49 09/18/2018     No results found for: MICROALBUMIN    ANTHROPOMETRICS:  Vitals: Wt 101.3 kg (223 lb 6.4 oz)  BMI 32.05 kg/m2  Body mass index is 32.05 kg/(m^2).      Wt Readings from Last 5 Encounters:   09/19/18 101.3 kg (223 lb 6.4 oz)   09/17/18 102.9 kg (226 lb 12.8 oz)   09/14/18 102.2 kg (225 lb 6.4 oz)   09/10/18 102.5 kg (225 lb 14.4 oz)   08/22/18 101.9 kg (224 lb 9.6 oz)       NUTRITION DIAGNOSIS: No  nutrition diagnosis at this time     NUTRITION INTERVENTION:  Education given to support: carb counting, labeling, fat modification, exercise, heart healthy diet and hypoglycemia rx/prevention  Education Materials Provided: Carbohydrate Counting and hypoglycemia  Verbalized recommendations today. Will f/u as needed.   PATIENT'S BEHAVIOR CHANGE GOALS:   See Patient Instructions for patient stated behavior change goals. AVS was printed and given to patient at today's appointment.    MONITOR / EVALUATE:        FOLLOW-UP:  Follow up with RD as needed.      Time spent in minutes: 75  Encounter: Individual

## 2018-09-20 NOTE — TELEPHONE ENCOUNTER
Patient states that he just started the increased dose of Toprol this Tuesday (been 2 days). Will check in next week.  Cele

## 2018-09-27 ENCOUNTER — MYC MEDICAL ADVICE (OUTPATIENT)
Dept: ENDOCRINOLOGY | Facility: CLINIC | Age: 53
End: 2018-09-27

## 2018-09-27 NOTE — TELEPHONE ENCOUNTER
Pt called. He has improved his diet. BG numbers in the 200s to 300s - mostly high before dinner. Has improved his diet and reports minimal amounts of carbs (10 gm per meal). Might take 1-2 U for carb coverage/meal. Lantus is 50 in am. Doesn't have lunch. Rec: administer 1 U Humalog per 3 grams CHO for all meals and snacks. Continue lantus and correction scale 1 U per 25 above 150/200.

## 2018-09-27 NOTE — TELEPHONE ENCOUNTER
Patient reports /79 P 70's taken at various times throughout the day. Patient states he feels fine, no chest pain, no swelling, no headaches or vision changes. He does report that his BS was 262 this am and notices after he takes Veltassa (3 hours later) that his BS is in the 300's. Patient was going to discuss with Endocrinology team but does not have a direct contact to reach them. Will pass message along to Abril Nugent, transplant coordinator and Dr. West. Patient did not want to discuss or be referred to MTM at this time.    Cele Mancilla LPN  Nephrology  [phone]

## 2018-10-04 ENCOUNTER — TELEPHONE (OUTPATIENT)
Dept: TRANSPLANT | Facility: CLINIC | Age: 53
End: 2018-10-04

## 2018-10-04 DIAGNOSIS — Z94.4 HISTORY OF LIVER TRANSPLANT (H): ICD-10-CM

## 2018-10-04 DIAGNOSIS — Z79.60 LONG-TERM USE OF IMMUNOSUPPRESSANT MEDICATION: ICD-10-CM

## 2018-10-04 RX ORDER — MYCOPHENOLIC ACID 360 MG/1
720 TABLET, DELAYED RELEASE ORAL EVERY 12 HOURS
Qty: 360 TABLET | Refills: 3 | Status: SHIPPED | OUTPATIENT
Start: 2018-10-04 | End: 2019-08-27

## 2018-10-04 NOTE — TELEPHONE ENCOUNTER
Pt calling to request refill of mycophenolic acid, is taking 720mg Q 12 hours.   This prescription called in to [facility] in [place], pt completely out of this medication.

## 2018-10-10 ENCOUNTER — HOSPITAL ENCOUNTER (OUTPATIENT)
Dept: LAB | Facility: CLINIC | Age: 53
Discharge: HOME | End: 2018-10-10
Attending: INTERNAL MEDICINE
Payer: COMMERCIAL

## 2018-10-10 DIAGNOSIS — D63.1 ANEMIA OF CHRONIC RENAL FAILURE, STAGE 3 (MODERATE) (H): ICD-10-CM

## 2018-10-10 DIAGNOSIS — E55.9 VITAMIN D DEFICIENCY: ICD-10-CM

## 2018-10-10 DIAGNOSIS — N18.30 CKD (CHRONIC KIDNEY DISEASE) STAGE 3, GFR 30-59 ML/MIN (H): ICD-10-CM

## 2018-10-10 DIAGNOSIS — Z94.4 LIVER REPLACED BY TRANSPLANT (H): ICD-10-CM

## 2018-10-10 DIAGNOSIS — N18.30 ANEMIA OF CHRONIC RENAL FAILURE, STAGE 3 (MODERATE) (H): ICD-10-CM

## 2018-10-10 LAB
ALBUMIN SERPL-MCNC: 3.7 G/DL (ref 3.4–5)
ALP SERPL-CCNC: 255 U/L (ref 40–150)
ALT SERPL W P-5'-P-CCNC: 53 U/L (ref 0–70)
ANION GAP SERPL CALCULATED.3IONS-SCNC: 4 MMOL/L (ref 3–14)
AST SERPL W P-5'-P-CCNC: 41 U/L (ref 0–45)
BASOPHILS # BLD AUTO: 0 10E9/L (ref 0–0.2)
BASOPHILS NFR BLD AUTO: 0.3 %
BILIRUB DIRECT SERPL-MCNC: 0.4 MG/DL (ref 0–0.2)
BILIRUB SERPL-MCNC: 0.9 MG/DL (ref 0.2–1.3)
BUN SERPL-MCNC: 35 MG/DL (ref 7–30)
CALCIUM SERPL-MCNC: 8.2 MG/DL (ref 8.5–10.1)
CHLORIDE SERPL-SCNC: 106 MMOL/L (ref 94–109)
CO2 SERPL-SCNC: 26 MMOL/L (ref 20–32)
CREAT SERPL-MCNC: 1.38 MG/DL (ref 0.66–1.25)
DIFFERENTIAL METHOD BLD: ABNORMAL
EOSINOPHIL # BLD AUTO: 0 10E9/L (ref 0–0.7)
EOSINOPHIL NFR BLD AUTO: 0.8 %
ERYTHROCYTE [DISTWIDTH] IN BLOOD BY AUTOMATED COUNT: 14.3 % (ref 10–15)
GFR SERPL CREATININE-BSD FRML MDRD: 54 ML/MIN/1.7M2
GLUCOSE SERPL-MCNC: 290 MG/DL (ref 70–99)
HCT VFR BLD AUTO: 34.5 % (ref 40–53)
HGB BLD-MCNC: 11.7 G/DL (ref 13.3–17.7)
IMM GRANULOCYTES # BLD: 0 10E9/L (ref 0–0.4)
IMM GRANULOCYTES NFR BLD: 0.5 %
LYMPHOCYTES # BLD AUTO: 1 10E9/L (ref 0.8–5.3)
LYMPHOCYTES NFR BLD AUTO: 25.8 %
MCH RBC QN AUTO: 32 PG (ref 26.5–33)
MCHC RBC AUTO-ENTMCNC: 33.9 G/DL (ref 31.5–36.5)
MCV RBC AUTO: 94 FL (ref 78–100)
MONOCYTES # BLD AUTO: 0.3 10E9/L (ref 0–1.3)
MONOCYTES NFR BLD AUTO: 7.2 %
NEUTROPHILS # BLD AUTO: 2.5 10E9/L (ref 1.6–8.3)
NEUTROPHILS NFR BLD AUTO: 65.4 %
NRBC # BLD AUTO: 0 10*3/UL
NRBC BLD AUTO-RTO: 0 /100
PHOSPHATE SERPL-MCNC: 2.5 MG/DL (ref 2.5–4.5)
PLATELET # BLD AUTO: 81 10E9/L (ref 150–450)
POTASSIUM SERPL-SCNC: 5 MMOL/L (ref 3.4–5.3)
PROT SERPL-MCNC: 6.7 G/DL (ref 6.8–8.8)
PTH-INTACT SERPL-MCNC: 22 PG/ML (ref 18–80)
RBC # BLD AUTO: 3.66 10E12/L (ref 4.4–5.9)
SODIUM SERPL-SCNC: 136 MMOL/L (ref 133–144)
TACROLIMUS BLD-MCNC: 7.3 UG/L (ref 5–15)
TME LAST DOSE: NORMAL H
WBC # BLD AUTO: 3.8 10E9/L (ref 4–11)

## 2018-10-10 PROCEDURE — 80069 RENAL FUNCTION PANEL: CPT

## 2018-10-10 PROCEDURE — 80048 BASIC METABOLIC PNL TOTAL CA: CPT

## 2018-10-10 PROCEDURE — 84155 ASSAY OF PROTEIN SERUM: CPT

## 2018-10-10 PROCEDURE — 82248 BILIRUBIN DIRECT: CPT

## 2018-10-10 PROCEDURE — 85025 COMPLETE CBC W/AUTO DIFF WBC: CPT

## 2018-10-10 PROCEDURE — 82306 VITAMIN D 25 HYDROXY: CPT

## 2018-10-10 PROCEDURE — 36415 COLL VENOUS BLD VENIPUNCTURE: CPT

## 2018-10-10 PROCEDURE — 82247 BILIRUBIN TOTAL: CPT

## 2018-10-10 PROCEDURE — 84450 TRANSFERASE (AST) (SGOT): CPT

## 2018-10-10 PROCEDURE — 80197 ASSAY OF TACROLIMUS: CPT | Performed by: INTERNAL MEDICINE

## 2018-10-10 PROCEDURE — 83970 ASSAY OF PARATHORMONE: CPT

## 2018-10-10 PROCEDURE — 84075 ASSAY ALKALINE PHOSPHATASE: CPT

## 2018-10-10 PROCEDURE — 84460 ALANINE AMINO (ALT) (SGPT): CPT

## 2018-10-11 LAB — DEPRECATED CALCIDIOL+CALCIFEROL SERPL-MC: 38 UG/L (ref 20–75)

## 2018-10-24 ENCOUNTER — HOSPITAL ENCOUNTER (OUTPATIENT)
Dept: LAB | Facility: CLINIC | Age: 53
Discharge: HOME | End: 2018-10-24
Attending: INTERNAL MEDICINE | Admitting: INTERNAL MEDICINE
Payer: COMMERCIAL

## 2018-10-24 DIAGNOSIS — Z94.4 LIVER REPLACED BY TRANSPLANT (H): ICD-10-CM

## 2018-10-24 LAB
ALBUMIN SERPL-MCNC: 4 G/DL (ref 3.4–5)
ALP SERPL-CCNC: 284 U/L (ref 40–150)
ALT SERPL W P-5'-P-CCNC: 62 U/L (ref 0–70)
ANION GAP SERPL CALCULATED.3IONS-SCNC: 7 MMOL/L (ref 3–14)
AST SERPL W P-5'-P-CCNC: 47 U/L (ref 0–45)
BILIRUB DIRECT SERPL-MCNC: 0.5 MG/DL (ref 0–0.2)
BILIRUB SERPL-MCNC: 0.9 MG/DL (ref 0.2–1.3)
BUN SERPL-MCNC: 42 MG/DL (ref 7–30)
CALCIUM SERPL-MCNC: 8.7 MG/DL (ref 8.5–10.1)
CHLORIDE SERPL-SCNC: 111 MMOL/L (ref 94–109)
CO2 SERPL-SCNC: 23 MMOL/L (ref 20–32)
CREAT SERPL-MCNC: 1.48 MG/DL (ref 0.66–1.25)
ERYTHROCYTE [DISTWIDTH] IN BLOOD BY AUTOMATED COUNT: 13.9 % (ref 10–15)
GFR SERPL CREATININE-BSD FRML MDRD: 50 ML/MIN/1.7M2
GLUCOSE SERPL-MCNC: 130 MG/DL (ref 70–99)
HCT VFR BLD AUTO: 32.4 % (ref 40–53)
HGB BLD-MCNC: 10.9 G/DL (ref 13.3–17.7)
MCH RBC QN AUTO: 32.1 PG (ref 26.5–33)
MCHC RBC AUTO-ENTMCNC: 33.6 G/DL (ref 31.5–36.5)
MCV RBC AUTO: 95 FL (ref 78–100)
PLATELET # BLD AUTO: 75 10E9/L (ref 150–450)
POTASSIUM SERPL-SCNC: 5.9 MMOL/L (ref 3.4–5.3)
PROT SERPL-MCNC: 7.2 G/DL (ref 6.8–8.8)
RBC # BLD AUTO: 3.4 10E12/L (ref 4.4–5.9)
SODIUM SERPL-SCNC: 141 MMOL/L (ref 133–144)
TACROLIMUS BLD-MCNC: 6.9 UG/L (ref 5–15)
TME LAST DOSE: NORMAL H
WBC # BLD AUTO: 3.5 10E9/L (ref 4–11)

## 2018-10-24 PROCEDURE — 80048 BASIC METABOLIC PNL TOTAL CA: CPT | Performed by: INTERNAL MEDICINE

## 2018-10-24 PROCEDURE — 36415 COLL VENOUS BLD VENIPUNCTURE: CPT | Performed by: INTERNAL MEDICINE

## 2018-10-24 PROCEDURE — 80076 HEPATIC FUNCTION PANEL: CPT | Performed by: INTERNAL MEDICINE

## 2018-10-24 PROCEDURE — 80197 ASSAY OF TACROLIMUS: CPT | Performed by: INTERNAL MEDICINE

## 2018-10-24 PROCEDURE — 85027 COMPLETE CBC AUTOMATED: CPT | Performed by: INTERNAL MEDICINE

## 2018-10-30 DIAGNOSIS — M54.50 LOW BACK PAIN AT MULTIPLE SITES: ICD-10-CM

## 2018-10-30 RX ORDER — GABAPENTIN 300 MG/1
300 CAPSULE ORAL 3 TIMES DAILY
Qty: 270 CAPSULE | Refills: 3 | Status: SHIPPED | OUTPATIENT
Start: 2018-10-30 | End: 2019-06-02

## 2018-11-05 ASSESSMENT — ENCOUNTER SYMPTOMS
LOSS OF CONSCIOUSNESS: 0
BRUISES/BLEEDS EASILY: 0
NECK PAIN: 1
TREMORS: 0
SWOLLEN GLANDS: 0
SPEECH CHANGE: 0
HEADACHES: 1
PARALYSIS: 0
WEAKNESS: 0
DISTURBANCES IN COORDINATION: 0
MEMORY LOSS: 0
BACK PAIN: 1
TINGLING: 0
NUMBNESS: 0
STIFFNESS: 1
SEIZURES: 0
ARTHRALGIAS: 1
MUSCLE WEAKNESS: 0
MUSCLE CRAMPS: 0
JOINT SWELLING: 1
MYALGIAS: 0

## 2018-11-06 ENCOUNTER — HOSPITAL ENCOUNTER (OUTPATIENT)
Dept: LAB | Facility: CLINIC | Age: 53
Discharge: HOME | End: 2018-11-06
Attending: INTERNAL MEDICINE | Admitting: INTERNAL MEDICINE
Payer: COMMERCIAL

## 2018-11-06 DIAGNOSIS — E87.5 HYPERKALEMIA: ICD-10-CM

## 2018-11-06 DIAGNOSIS — Z94.4 LIVER REPLACED BY TRANSPLANT (H): ICD-10-CM

## 2018-11-06 LAB
ALBUMIN SERPL-MCNC: 3.7 G/DL (ref 3.4–5)
ALP SERPL-CCNC: 251 U/L (ref 40–150)
ALT SERPL W P-5'-P-CCNC: 51 U/L (ref 0–70)
ANION GAP SERPL CALCULATED.3IONS-SCNC: 6 MMOL/L (ref 3–14)
AST SERPL W P-5'-P-CCNC: 38 U/L (ref 0–45)
BILIRUB DIRECT SERPL-MCNC: 0.4 MG/DL (ref 0–0.2)
BILIRUB SERPL-MCNC: 0.7 MG/DL (ref 0.2–1.3)
BUN SERPL-MCNC: 42 MG/DL (ref 7–30)
CALCIUM SERPL-MCNC: 8.4 MG/DL (ref 8.5–10.1)
CHLORIDE SERPL-SCNC: 110 MMOL/L (ref 94–109)
CO2 SERPL-SCNC: 23 MMOL/L (ref 20–32)
CREAT SERPL-MCNC: 1.51 MG/DL (ref 0.66–1.25)
ERYTHROCYTE [DISTWIDTH] IN BLOOD BY AUTOMATED COUNT: 13.8 % (ref 10–15)
GFR SERPL CREATININE-BSD FRML MDRD: 48 ML/MIN/1.7M2
GLUCOSE SERPL-MCNC: 133 MG/DL (ref 70–99)
HCT VFR BLD AUTO: 31.8 % (ref 40–53)
HGB BLD-MCNC: 10.7 G/DL (ref 13.3–17.7)
MCH RBC QN AUTO: 31.8 PG (ref 26.5–33)
MCHC RBC AUTO-ENTMCNC: 33.6 G/DL (ref 31.5–36.5)
MCV RBC AUTO: 95 FL (ref 78–100)
PHOSPHATE SERPL-MCNC: 2.5 MG/DL (ref 2.5–4.5)
PLATELET # BLD AUTO: 77 10E9/L (ref 150–450)
POTASSIUM SERPL-SCNC: 4.8 MMOL/L (ref 3.4–5.3)
PROT SERPL-MCNC: 6.7 G/DL (ref 6.8–8.8)
RBC # BLD AUTO: 3.36 10E12/L (ref 4.4–5.9)
SODIUM SERPL-SCNC: 139 MMOL/L (ref 133–144)
TACROLIMUS BLD-MCNC: 8.4 UG/L (ref 5–15)
TME LAST DOSE: NORMAL H
WBC # BLD AUTO: 3.9 10E9/L (ref 4–11)

## 2018-11-06 PROCEDURE — 84450 TRANSFERASE (AST) (SGOT): CPT

## 2018-11-06 PROCEDURE — 84460 ALANINE AMINO (ALT) (SGPT): CPT

## 2018-11-06 PROCEDURE — 80069 RENAL FUNCTION PANEL: CPT

## 2018-11-06 PROCEDURE — 82247 BILIRUBIN TOTAL: CPT

## 2018-11-06 PROCEDURE — 84075 ASSAY ALKALINE PHOSPHATASE: CPT

## 2018-11-06 PROCEDURE — 36415 COLL VENOUS BLD VENIPUNCTURE: CPT

## 2018-11-06 PROCEDURE — 82248 BILIRUBIN DIRECT: CPT

## 2018-11-06 PROCEDURE — 85027 COMPLETE CBC AUTOMATED: CPT

## 2018-11-06 PROCEDURE — 80197 ASSAY OF TACROLIMUS: CPT | Performed by: INTERNAL MEDICINE

## 2018-11-06 PROCEDURE — 84155 ASSAY OF PROTEIN SERUM: CPT

## 2018-11-08 ENCOUNTER — TELEPHONE (OUTPATIENT)
Dept: ENDOCRINOLOGY | Facility: CLINIC | Age: 53
End: 2018-11-08

## 2018-11-08 DIAGNOSIS — E11.42 TYPE 2 DIABETES MELLITUS WITH DIABETIC POLYNEUROPATHY, WITH LONG-TERM CURRENT USE OF INSULIN (H): ICD-10-CM

## 2018-11-08 DIAGNOSIS — I10 HTN (HYPERTENSION): ICD-10-CM

## 2018-11-08 DIAGNOSIS — Z79.4 TYPE 2 DIABETES MELLITUS WITH DIABETIC POLYNEUROPATHY, WITH LONG-TERM CURRENT USE OF INSULIN (H): ICD-10-CM

## 2018-11-08 RX ORDER — AMLODIPINE BESYLATE 10 MG/1
10 TABLET ORAL DAILY
Qty: 90 TABLET | Refills: 3 | Status: SHIPPED | OUTPATIENT
Start: 2018-11-08 | End: 2019-08-10

## 2018-11-08 NOTE — TELEPHONE ENCOUNTER
Last Office Visit with Nephrologist:  9/14/2018.  Medication refilled per [facility] protocol.    Quang Conteh RN

## 2018-11-08 NOTE — TELEPHONE ENCOUNTER
[facility] Call Center    Phone Message    May a detailed message be left on voicemail: yes    Reason for Call: Medication Refill Request    Has the patient contacted the pharmacy for the refill? Yes   Name of medication being requested: insulin pen needle (BD ENE U/F) 32G X 4 MM  Provider who prescribed the medication: Brett  Pharmacy:  [id number] - [place], [place] - [address]  Date medication is needed: ASAP, pt states he has only 4-5 needles left.      Action Taken: Message routed to:  [facility] ([facility]): Christine

## 2018-11-19 ENCOUNTER — OFFICE VISIT (OUTPATIENT)
Dept: ENDOCRINOLOGY | Facility: CLINIC | Age: 53
End: 2018-11-19
Payer: COMMERCIAL

## 2018-11-19 VITALS
HEIGHT: 70 IN | SYSTOLIC BLOOD PRESSURE: 129 MMHG | BODY MASS INDEX: 35.07 KG/M2 | DIASTOLIC BLOOD PRESSURE: 78 MMHG | WEIGHT: 245 LBS | HEART RATE: 74 BPM

## 2018-11-19 DIAGNOSIS — E11.9 TYPE 2 DIABETES MELLITUS NOT AT GOAL (H): ICD-10-CM

## 2018-11-19 DIAGNOSIS — E11.21 TYPE 2 DIABETES MELLITUS WITH DIABETIC NEPHROPATHY, WITH LONG-TERM CURRENT USE OF INSULIN (H): Primary | ICD-10-CM

## 2018-11-19 DIAGNOSIS — I10 BENIGN ESSENTIAL HYPERTENSION: ICD-10-CM

## 2018-11-19 DIAGNOSIS — Z79.4 TYPE 2 DIABETES MELLITUS WITH DIABETIC NEPHROPATHY, WITH LONG-TERM CURRENT USE OF INSULIN (H): Primary | ICD-10-CM

## 2018-11-19 LAB — HBA1C MFR BLD: 6.1 % (ref 4.3–6)

## 2018-11-19 RX ORDER — INSULIN LISPRO 100 [IU]/ML
INJECTION, SOLUTION INTRAVENOUS; SUBCUTANEOUS
Qty: 72 ML | Refills: 3 | Status: SHIPPED | OUTPATIENT
Start: 2018-11-19 | End: 2018-11-19

## 2018-11-19 ASSESSMENT — PAIN SCALES - GENERAL: PAINLEVEL: NO PAIN (0)

## 2018-11-19 NOTE — LETTER
11/19/2018       RE: Camacho Bhagat  [address] [facility] [address]     Dear Colleague,    Thank you for referring your patient, Camacho Bhagat, to the [facility] ENDOCRINOLOGY at [facility]. Please see a copy of my visit note below.      HPI: Camacho Bhagat is a 53 year old male who is here for a clinic visit.  His past medical history significant for fibromyalgia, DVT, nonalcoholic steatohepatosis and liver cancer/status post transplant, hypertension, obstructive sleep apnea, PAD, OA/RA and CVA.     History of Diabetes  Type 2   This was diagnosed in 2002.  Oral meds first, later placed on insulin for the last 7 years.     Related history: h/o CASTAÑEDA cirrhosis +/- work exposures, and HCC s/p liver transplant 3/2017. Course complicated by acute abdomen/neutropenic fever requiring right hemicolectomy and colostomy Fall 2017. He later underwent colostomy takedown 1/5/18 which was complicated by abdominal wall abscess at a drain site. During this course, he has also developed excessive proteinuria which is followed by nephrology.     Current DM Regimen:  50 units of Lantus in the morning  1 unit NovoLog per 3 g carbohydrates for all meals and snacks  Correction dose of 1 unit per 25 mg above 150 during daytime and above 200 at bedtime    The glucometer readings revealed that he checks his blood glucose 2 times daily, before breakfast and before dinner.  The meals appear to occur at different times from one day to the other.  The average blood glucose over the last month is 156 with a standard deviation of 64 in the range variable between 57 and 310.  There is only one hypoglycemic episode documented by the meter, of 57, which occurred post breakfast, within 2 hours of taking insulin for breakfast.  The patient reports feeling sweaty and dizzy at the time his blood sugar was low.  He denies experiencing tremors unless his blood sugars are the 30s or 40s.  The patient is using  an johan (One Touch Verio)  which is helping him document the carbohydrate intake and the NovoLog dose administered for meals.  We reviewed the iPhone records.  Average daily NovoLog dose for breakfast and dinner is 25-30 units.  Overall, his morning blood sugars are quite variable, from 122 to 200s.  Predinner blood glucose appears to be better controlled.  A1c today was 6.1, in the context of anemia.    Related meds:  Prednisone started for transplant and the dose remains 2.5 mg per day.  He continues to take tacrolimus.    Diet: 2 meals per day  Breakfast: varies, example - english muffin, sausage egg/cheese biscuit    Lunch: skips   Dinner: rarely eats out,cooks at home, example - baked chicken, veggies, rice  Snacks: rarely - trail mix or beef jerky - no or low carbs snacks   Alcohol or sugared beverages: none.     Exercise   Walks dog every day, 1-2 miles  He does cardio and weight exercises 1 1/2 hours, 3 times a week, and he swims, 3 times a week.    Complications    + chronic complications.     1. Retinopathy: No known eye disease  Last eye exam was 2018.  H/o DR. He has cataracts.      2.  Nephropathy: yes. He has significant proteinuria and f/up with nephrology.  Recent GFR in the 50s.  He was started on losartan on 18 and the dose was increased to 50 mg daily.    Albumin Urine mg/g Cr   Date Value Ref Range Status   10/27/2017 132.46 (H) 0 - 17 mg/g Cr Final     Medication (Note: This includes the hypertensive combination subclass to make sure to show all ACEI/ARB's.)     Angiotensin II Receptor Antagonists Sig    losartan (COZAAR) 50 MG tablet Take 1 tablet (50 mg) by mouth daily          3. Neuropathy - left foot - pins/needles sensation.   Last monofilament testin/18    5. Macrovascular: none     6. Lipids: not on statin , last LDL in 30s    Prescription Medications as of 2018             amLODIPine (NORVASC) 10 MG tablet Take 1 tablet (10 mg) by mouth daily    cholecalciferol (VITAMIN  D3) 1000 UNIT tablet Take 1 tablet (1,000 Units) by mouth daily    CIALIS 5 MG tablet Take 5 mg by mouth as needed     cyclobenzaprine (FLEXERIL) 10 MG tablet Take 1 tablet (10 mg) by mouth 3 times daily as needed for muscle spasms    furosemide (LASIX) 40 MG tablet Take 1 tablet (40 mg) by mouth 2 times daily    gabapentin (NEURONTIN) 300 MG capsule Take 1 capsule (300 mg) by mouth 3 times daily    Glucose Blood (BLOOD GLUCOSE TEST STRIPS) STRP 1 strip by Lancet route 4 times daily    HUMALOG KWIKPEN 100 UNIT/ML soln 1Unit Novolog per 10 grams CHO for all meals and snacks. Total daily insulin dose up to 45Units.    insulin glargine (LANTUS) 100 UNIT/ML injection Inject 50 Units Subcutaneous every morning    insulin pen needle (BD ENE U/F) 32G X 4 MM Use 1 pen needles daily or as directed.    linagliptin (TRADJENTA) 5 MG TABS tablet Take 1 tablet (5 mg) by mouth daily    losartan (COZAAR) 50 MG tablet Take 1 tablet (50 mg) by mouth daily    metoprolol succinate (TOPROL-XL) 100 MG 24 hr tablet Take 1 tablet (100 mg) by mouth daily    multivitamin, therapeutic with minerals (MULTI-VITAMIN) TABS tablet Take 1 tablet by mouth every morning    mycophenolic acid (GENERIC EQUIVALENT) 360 MG EC tablet Take 2 tablets (720 mg) by mouth every 12 hours    NOVOLOG FLEXPEN 100 UNIT/ML soln Administer 1 U Novolog per 10 grams CHO for all meals and snacks. Total daily insulin dose up to 45 U.    omeprazole (PRILOSEC) 20 MG CR capsule Take 1 capsule (20 mg) by mouth 2 times daily    oxyCODONE IR (ROXICODONE) 5 MG tablet Take 1 tablet (5 mg) by mouth every 4 hours as needed for pain maximum 6 tablet(s) per day    patiromer (VELTASSA) 8.4 g packet Take 16.8 g by mouth daily    predniSONE (DELTASONE) 2.5 MG tablet Take 1 tablet (2.5 mg) by mouth daily    tacrolimus (GENERIC EQUIVALENT) 1 MG capsule Take 6mg (6capsules) every morning, and take 5mg (5capsules) every evening, take every 12 hours.    ursodiol 500 MG TABS Take 500 mg by  mouth 2 times daily          Past Medical History:   Diagnosis Date     Depressive disorder, not elsewhere classified      Diabetes type 2, controlled (H) 11/10/2016     Esophageal reflux      Fibromyalgia 1/2009    dx with Dr Benitez( Rheum)     Gangrene of finger (H) 8/25/2017     H/O deep venous thrombosis 11/2001    Permanent IVC filter in place.     H/O CASTAÑEDA (nonalcoholic steatohepatitis)      H/O Pneumonia, organism unspecified(486) 10/2001    Included ARDS, sepsis, and  acute renal failure; hospitalized, required tracheostomy placement.     H/O: HTN (hypertension) 11/2001    No longer prescribed antihypertensive medication.       History of CVA (cerebrovascular accident)      History of hepatocellular carcinoma      History of liver transplant (H)      History of obstructive sleep apnea     No longer wears CPAP since losing approximately 200 pounds with his liver transplant and its complications.       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     Osteoarthritis      Presence of PERMANENT IVC filter      Rheumatoid arthritis(714.0)    DEXA scan normal prior to transplant, in 2016    Past Surgical History:   Procedure Laterality Date     BENCH LIVER N/A 3/4/2017    Procedure: BENCH LIVER;  Surgeon: Jovan Tran MD;  Location:  OR      TOTAL KNEE ARTHROPLASTY  2008    Right knee arthroscopy     CHOLECYSTECTOMY       COLONOSCOPY N/A 7/21/2017    Procedure: COMBINED COLONOSCOPY, SINGLE OR MULTIPLE BIOPSY/POLYPECTOMY BY BIOPSY;  Colonoscopy;  Surgeon: Izaiah Montes MD;  Location:  GI     ENDOSCOPIC RETROGRADE CHOLANGIOPANCREATOGRAM N/A 5/22/2018    Procedure: COMBINED ENDOSCOPIC RETROGRADE CHOLANGIOPANCREATOGRAPHY, PLACE TUBE/STENT;  Endoscopic Retrograde Cholangiopancreatography with sphincterotomy and pancreatic duct stent  placement;  Surgeon: Zay Gaitan MD;  Location: [facility] OR     ENT SURGERY      Repair of deviated septum     ESOPHAGOSCOPY, GASTROSCOPY, DUODENOSCOPY (EGD), COMBINED N/A 8/4/2016    Procedure: COMBINED ESOPHAGOSCOPY, GASTROSCOPY, DUODENOSCOPY (EGD), BIOPSY SINGLE OR MULTIPLE;  Surgeon: Trent Pederson MD;  Location:  GI     ESOPHAGOSCOPY, GASTROSCOPY, DUODENOSCOPY (EGD), COMBINED N/A 8/27/2017    Procedure: COMBINED ESOPHAGOSCOPY, GASTROSCOPY, DUODENOSCOPY (EGD);;  Surgeon: Los Wynn MD;  Location: [facility] GI     INCISION AND DRAINAGE ABDOMEN WASHOUT, COMBINED Right 2/14/2018    Procedure: COMBINED INCISION AND DRAINAGE ABDOMEN WASHOUT;  COMBINED INCISION AND DRAINAGE right ABDOMEN flank;  Surgeon: Sara Dinh MD;  Location: [facility]     LAPAROTOMY EXPLORATORY N/A 7/4/2017    Procedure: LAPAROTOMY EXPLORATORY;  Exploratory Laparotomy, washout;  Surgeon: Tip Zhang MD;  [facility]     LAPAROTOMY EXPLORATORY N/A 7/5/2017    Procedure: LAPAROTOMY EXPLORATORY;  Exploratory Laparotomy, Washout with closure.;  Surgeon: Tip Zhang MD;  [facility]     LAPAROTOMY EXPLORATORY N/A 7/26/2017    Procedure: LAPAROTOMY EXPLORATORY;  Exploratory Laparotomy, Right angelique-colectomy, end ileostomy, mucosal fistula, partial omentectomy;  Surgeon: Sara Dinh MD;  Location: [facility]     ORTHOPEDIC SURGERY Right     Repair of dislocated wrist.       RELEASE TRIGGER FINGER Right 11/10/2017    Procedure: RELEASE TRIGGER FINGER;  Debride, V-Y Flap Right Index Finger;  Surgeon: Momo Noonan MD;  [facility]     TAKEDOWN ILEOSTOMY N/A 1/5/2018    Procedure: TAKEDOWN ILEOSTOMY;  Exploratory Laparotomy, Lysis of Adhesions, Takedown Of End Ileostomy, Takedown of mucocutaneous fistula, ileocolic resection  And Colorectal Anastomosis, Primary repair of Ventral Hernia, Anesthesia Block ;  Surgeon: Sara Dinh MD;  Location: [facility]     TRACHEOSTOMY       During hospitalization for pneumonia.       TRANSPLANT LIVER RECIPIENT  DONOR N/A 3/4/2017    Procedure: TRANSPLANT LIVER RECIPIENT  DONOR;  Surgeon: Jovan Tran MD;  Location: [facility] OR     Review of Systems     Constitutional: weight stable (the patient attributes the weight gain noticed today to boots and clothing). Appetite is good   Head: no headache   Eye: no vision change/ loss of peripheral vision.   ENT: No throat congestion.   Respiratory: no cough   Cardiovascular: no chest pain or tachycardia  Gastrointestinal: Negative for vomiting, abdominal pain and diarrhea.  Genitourinary: No bladder problems.  Musculoskeletal: Negative for myalgias. No weakness. Diffuse joint pain - mainly back pain, elbows and knees.   Neurological: Negative for seizures and headaches. Muscle cramps - mainly hands. Headaches a couple of times of week, for the last 25 years and unchanged  Psychiatric/Behavioral: Negative for behavioral problem and dysphoric mood.    Past Medical History:   Diagnosis Date     Depressive disorder, not elsewhere classified      Diabetes type 2, controlled (H) 11/10/2016     Esophageal reflux      Fibromyalgia 2009    dx with Dr Benitez( Rheum)     Gangrene of finger (H) 2017     H/O deep venous thrombosis 2001    Permanent IVC filter in place.     H/O CASTAÑEDA (nonalcoholic steatohepatitis)      H/O Pneumonia, organism unspecified(486) 10/2001    Included ARDS, sepsis, and  acute renal failure; hospitalized, required tracheostomy placement.     H/O: HTN (hypertension) 2001    No longer prescribed antihypertensive medication.       History of CVA (cerebrovascular accident)      History of hepatocellular carcinoma      History of liver transplant (H)      History of obstructive sleep apnea     No longer wears CPAP since losing approximately 200 pounds with his liver transplant and its complications.       HLD (hyperlipidemia)      Ischemia of both lower extremities  8/25/2017    Distal ischemia due to shock/high pressor requirements     Liver transplant rejection (H) 6/11/2018     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     Osteoarthritis      Presence of PERMANENT IVC filter      Rheumatoid arthritis(714.0)        Past Surgical History:   Procedure Laterality Date     BENCH LIVER N/A 3/4/2017    Procedure: BENCH LIVER;  Surgeon: Jovan Tran MD;  [facility]: [facility]     C TOTAL KNEE ARTHROPLASTY  2008    Right knee arthroscopy     CHOLECYSTECTOMY       COLONOSCOPY N/A 7/21/2017    Procedure: COMBINED COLONOSCOPY, SINGLE OR MULTIPLE BIOPSY/POLYPECTOMY BY BIOPSY;  Colonoscopy;  Surgeon: Izaiah Montes MD;  Location: [facility]     ENDOSCOPIC RETROGRADE CHOLANGIOPANCREATOGRAM N/A 5/22/2018    Procedure: COMBINED ENDOSCOPIC RETROGRADE CHOLANGIOPANCREATOGRAPHY, PLACE TUBE/STENT;  Endoscopic Retrograde Cholangiopancreatography with sphincterotomy and pancreatic duct stent placement;  Surgeon: Zay Gaitan MD;  Location: [facility]     ENT SURGERY      Repair of deviated septum     ESOPHAGOSCOPY, GASTROSCOPY, DUODENOSCOPY (EGD), COMBINED N/A 8/4/2016    Procedure: COMBINED ESOPHAGOSCOPY, GASTROSCOPY, DUODENOSCOPY (EGD), BIOPSY SINGLE OR MULTIPLE;  Surgeon: Trent Pederson MD;  Location:  GI     ESOPHAGOSCOPY, GASTROSCOPY, DUODENOSCOPY (EGD), COMBINED N/A 8/27/2017    Procedure: COMBINED ESOPHAGOSCOPY, GASTROSCOPY, DUODENOSCOPY (EGD);;  Surgeon: Los Wynn MD;  [facility]: [facility]     INCISION AND DRAINAGE ABDOMEN WASHOUT, COMBINED Right 2/14/2018    Procedure: COMBINED INCISION AND DRAINAGE ABDOMEN WASHOUT;  COMBINED INCISION AND DRAINAGE right ABDOMEN flank;  Surgeon: Sara Dnih MD;  [facility]     LAPAROTOMY EXPLORATORY N/A 7/4/2017    Procedure: LAPAROTOMY EXPLORATORY;  Exploratory Laparotomy, washout;  Surgeon: Tip Zhang MD;  [facility]     LAPAROTOMY EXPLORATORY N/A 7/5/2017    Procedure: LAPAROTOMY EXPLORATORY;   Exploratory Laparotomy, Washout with closure.;  Surgeon: Tip Zhang MD;  [facility]     LAPAROTOMY EXPLORATORY N/A 2017    Procedure: LAPAROTOMY EXPLORATORY;  Exploratory Laparotomy, Right angelique-colectomy, end ileostomy, mucosal fistula, partial omentectomy;  Surgeon: Sara Dinh MD;  [facility]     ORTHOPEDIC SURGERY Right     Repair of dislocated wrist.       RELEASE TRIGGER FINGER Right 11/10/2017    Procedure: RELEASE TRIGGER FINGER;  Debride, V-Y Flap Right Index Finger;  Surgeon: Momo Noonan MD;  [facility]     TAKEDOWN ILEOSTOMY N/A 2018    Procedure: TAKEDOWN ILEOSTOMY;  Exploratory Laparotomy, Lysis of Adhesions, Takedown Of End Ileostomy, Takedown of mucocutaneous fistula, ileocolic resection  And Colorectal Anastomosis, Primary repair of Ventral Hernia, Anesthesia Block ;  Surgeon: Sara Dinh MD;  [facility]     TRACHEOSTOMY  2001    During hospitalization for pneumonia.       TRANSPLANT LIVER RECIPIENT  DONOR N/A 3/4/2017    Procedure: TRANSPLANT LIVER RECIPIENT  DONOR;  Surgeon: Jovan Tran MD;  [facility]       Family History   Problem Relation Age of Onset     Diabetes Father      Hypertension Father      Substance Abuse Father      Arthritis Mother      Thyroid Cancer Mother      Survivor!     Cervical Cancer Maternal Grandmother      Cerebrovascular Disease Maternal Grandfather      No Known Problems Paternal Grandmother      Prostate Cancer Paternal Grandfather      Colon Cancer No family hx of      Hyperlipidemia No family hx of      Coronary Artery Disease No family hx of      Breast Cancer No family hx of        Social history:  .  He used to work as a nurse at [employer].  Former smoker for 2 years, 0.75 packs/day, quit in .  He used to chew tobacco and he stopped this in .  He has not been drinking any alcohol since the liver transplant.    Objective:   There were no vitals  "taken for this visit.  Wt Readings from Last 10 Encounters:   11/19/18 111.1 kg (245 lb)   09/19/18 101.3 kg (223 lb 6.4 oz)   09/17/18 102.9 kg (226 lb 12.8 oz)   09/14/18 102.2 kg (225 lb 6.4 oz)   09/10/18 102.5 kg (225 lb 14.4 oz)   08/22/18 101.9 kg (224 lb 9.6 oz)   07/25/18 101.2 kg (223 lb)   07/06/18 101.2 kg (223 lb)   06/06/18 100.6 kg (221 lb 11.2 oz)   05/22/18 98.1 kg (216 lb 4.3 oz)     /75  Pulse 74  Ht 1.778 m (5' 10\")  Wt 111.1 kg (245 lb)  BMI 35.15 kg/m2    General: well developed, well nourished, in NAD  Eyes: anicteric, normal extra-occular movements, no lid lag, no stare  HEENT: MMM, oropharynx clear, no LAD, no thyrmegaly   CV: RRR, normal S1/S2, no MRG  Pulm: CTAB, normal breathing sounds  Abd: soft, non tender, non distended, multiple healed surgical scars, no notable lipohypertrophy  Neuro: alert, CNII-XII intact, strength 5/5 in all 4 extremities  Extremities: no LE edema  Skin: No lesions noted, normal texture  Musculoskeletal: Right index finger amputation, osteoarthritic changes of the fingers    In House Labs:   Lab Results   Component Value Date    A1C 5.5 04/11/2018    A1C 5.1 01/06/2018    A1C  07/06/2017     Canceled, Test credited   Below Assay Range  NOTIFIED LEONARD ONEILL AT 0538 ON 7/6/17 BY EAANTOLIN      A1C 9.4 (H) 02/16/2017    A1C 6.2 (H) 12/14/2016    HEMOGLOBINA1 6.1 (A) 11/19/2018       Hemoglobin   Date Value Ref Range Status   11/06/2018 10.7 (L) 13.3 - 17.7 g/dL Final     Hematocrit   Date Value Ref Range Status   11/06/2018 31.8 (L) 40.0 - 53.0 % Final     Cholesterol   Date Value Ref Range Status   04/02/2018 97 <200 mg/dL Final     Cholesterol/HDL Ratio   Date Value Ref Range Status   07/03/2012 3.8 0.0 - 5.0 Final     HDL Cholesterol   Date Value Ref Range Status   04/02/2018 36 (L) >39 mg/dL Final     LDL Cholesterol Calculated   Date Value Ref Range Status   04/02/2018 39 <100 mg/dL Final     Comment:     Desirable:       <100 mg/dl     VLDL-Cholesterol "   Date Value Ref Range Status   07/03/2012 36 (H) 0 - 30 mg/dL Final     Triglycerides   Date Value Ref Range Status   04/02/2018 109 <150 mg/dL Final     Albumin Urine mg/L   Date Value Ref Range Status   10/27/2017 122 mg/L Final     TSH   Date Value Ref Range Status   04/02/2018 2.74 0.40 - 4.00 mU/L Final         Last Basic Metabolic Panel:    Sodium   Date Value Ref Range Status   11/06/2018 139 133 - 144 mmol/L Final     Potassium   Date Value Ref Range Status   11/06/2018 4.8 3.4 - 5.3 mmol/L Final     Chloride   Date Value Ref Range Status   11/06/2018 110 (H) 94 - 109 mmol/L Final     Calcium   Date Value Ref Range Status   11/06/2018 8.4 (L) 8.5 - 10.1 mg/dL Final     Carbon Dioxide   Date Value Ref Range Status   11/06/2018 23 20 - 32 mmol/L Final     Urea Nitrogen   Date Value Ref Range Status   11/06/2018 42 (H) 7 - 30 mg/dL Final     Creatinine   Date Value Ref Range Status   11/06/2018 1.51 (H) 0.66 - 1.25 mg/dL Final     GFR Estimate   Date Value Ref Range Status   11/06/2018 48 (L) >60 mL/min/1.7m2 Final     Comment:     Non  GFR Calc     Glucose   Date Value Ref Range Status   11/06/2018 133 (H) 70 - 99 mg/dL Final       AST   Date Value Ref Range Status   11/06/2018 38 0 - 45 U/L Final     ALT   Date Value Ref Range Status   11/06/2018 51 0 - 70 U/L Final     Albumin Urine mg/g Cr   Date Value Ref Range Status   10/27/2017 132.46 (H) 0 - 17 mg/g Cr Final       Assessment/Treatment Plan:      Camacho Bhagat is a 53 year old male with    1. Type 2 Diabetes: A1c less reliable in the context of anemia.  The meter reveals a wide range of variation of the morning blood sugar, which can be related to the bedtime blood sugar or to the long-acting insulin wearing off during the night.  Discussed about the option of using a glucose sensor.  Both freestyle mikki and DEXCOM G6 options were reviewed in detail with the patient.  He is concerned about using a sensor, given the fact he is very  active and he swims almost on a daily basis.  For now, he would prefer to continue to check his blood sugar using the meter.  Recommendations  Check blood glucose before meals and at bedtime  Try to administer NovoLog 15 minutes prior to eating.  (Currently, the patient administers the injections right before he starts eating)  Continue to keep records of the blood sugar, carbohydrate intake and insulin dose administered.  Have these records sent to us in 1-2 weeks.   Flu vaccine    DM complications:  Nephropathy - + proteinuria, managed by nephrology, on losartan  Neuropathy - + sx of parasthesias, + h/o gangrene associated with critical illness and pressor use, requried wound debridement but now healed.   CVD/Lipids - not on statin but LDL in 30s, will continue to monitor.    2.  Hypertension  BP - elevated on the first check, but normalized at the end of the appointment    Orders Placed This Encounter   Procedures     C FLU VAC QUADRIVALENT SPLIT VIRUS 3+YRS IM     Hemoglobin A1c POCT       Again, thank you for allowing me to participate in the care of your patient.      Sincerely,    Alisa West MD

## 2018-11-19 NOTE — PROGRESS NOTES
HPI: Camacho Bhagat is a 53 year old male who is here for a clinic visit.  His past medical history significant for fibromyalgia, DVT, nonalcoholic steatohepatosis and liver cancer/status post transplant, hypertension, obstructive sleep apnea, PAD, OA/RA and CVA.     History of Diabetes  Type 2   This was diagnosed in 2002.  Oral meds first, later placed on insulin for the last 7 years.     Related history: h/o CASTAÑEDA cirrhosis +/- work exposures, and HCC s/p liver transplant 3/2017. Course complicated by acute abdomen/neutropenic fever requiring right hemicolectomy and colostomy Fall 2017. He later underwent colostomy takedown 1/5/18 which was complicated by abdominal wall abscess at a drain site. During this course, he has also developed excessive proteinuria which is followed by nephrology.     Current DM Regimen:  50 units of Lantus in the morning  1 unit NovoLog per 3 g carbohydrates for all meals and snacks  Correction dose of 1 unit per 25 mg above 150 during daytime and above 200 at bedtime    The glucometer readings revealed that he checks his blood glucose 2 times daily, before breakfast and before dinner.  The meals appear to occur at different times from one day to the other.  The average blood glucose over the last month is 156 with a standard deviation of 64 in the range variable between 57 and 310.  There is only one hypoglycemic episode documented by the meter, of 57, which occurred post breakfast, within 2 hours of taking insulin for breakfast.  The patient reports feeling sweaty and dizzy at the time his blood sugar was low.  He denies experiencing tremors unless his blood sugars are the 30s or 40s.  The patient is using an johan (One Touch Verio)  which is helping him document the carbohydrate intake and the NovoLog dose administered for meals.  We reviewed the iPhone records.  Average daily NovoLog dose for breakfast and dinner is 25-30 units.  Overall, his morning blood sugars are quite variable,  from 122 to 200s.  Predinner blood glucose appears to be better controlled.  A1c today was 6.1, in the context of anemia.    Related meds:  Prednisone started for transplant and the dose remains 2.5 mg per day.  He continues to take tacrolimus.    Diet: 2 meals per day  Breakfast: varies, example - english muffin, sausage egg/cheese biscuit    Lunch: skips   Dinner: rarely eats out,cooks at home, example - baked chicken, veggies, rice  Snacks: rarely - trail mix or beef jerky - no or low carbs snacks   Alcohol or sugared beverages: none.     Exercise   Walks dog every day, 1-2 miles  He does cardio and weight exercises 1 1/2 hours, 3 times a week, and he swims, 3 times a week.    Complications    + chronic complications.     1. Retinopathy: No known eye disease  Last eye exam was 2018.  H/o DR. He has cataracts.      2.  Nephropathy: yes. He has significant proteinuria and f/up with nephrology.  Recent GFR in the 50s.  He was started on losartan on 18 and the dose was increased to 50 mg daily.    Albumin Urine mg/g Cr   Date Value Ref Range Status   10/27/2017 132.46 (H) 0 - 17 mg/g Cr Final     Medication (Note: This includes the hypertensive combination subclass to make sure to show all ACEI/ARB's.)     Angiotensin II Receptor Antagonists Sig    losartan (COZAAR) 50 MG tablet Take 1 tablet (50 mg) by mouth daily          3. Neuropathy - left foot - pins/needles sensation.   Last monofilament testin/18    5. Macrovascular: none     6. Lipids: not on statin , last LDL in 30s    Prescription Medications as of 2018             amLODIPine (NORVASC) 10 MG tablet Take 1 tablet (10 mg) by mouth daily    cholecalciferol (VITAMIN D3) 1000 UNIT tablet Take 1 tablet (1,000 Units) by mouth daily    CIALIS 5 MG tablet Take 5 mg by mouth as needed     cyclobenzaprine (FLEXERIL) 10 MG tablet Take 1 tablet (10 mg) by mouth 3 times daily as needed for muscle spasms    furosemide (LASIX) 40 MG tablet Take 1 tablet  (40 mg) by mouth 2 times daily    gabapentin (NEURONTIN) 300 MG capsule Take 1 capsule (300 mg) by mouth 3 times daily    Glucose Blood (BLOOD GLUCOSE TEST STRIPS) STRP 1 strip by Lancet route 4 times daily    HUMALOG KWIKPEN 100 UNIT/ML soln 1Unit Novolog per 10 grams CHO for all meals and snacks. Total daily insulin dose up to 45Units.    insulin glargine (LANTUS) 100 UNIT/ML injection Inject 50 Units Subcutaneous every morning    insulin pen needle (BD ENE U/F) 32G X 4 MM Use 1 pen needles daily or as directed.    linagliptin (TRADJENTA) 5 MG TABS tablet Take 1 tablet (5 mg) by mouth daily    losartan (COZAAR) 50 MG tablet Take 1 tablet (50 mg) by mouth daily    metoprolol succinate (TOPROL-XL) 100 MG 24 hr tablet Take 1 tablet (100 mg) by mouth daily    multivitamin, therapeutic with minerals (MULTI-VITAMIN) TABS tablet Take 1 tablet by mouth every morning    mycophenolic acid (GENERIC EQUIVALENT) 360 MG EC tablet Take 2 tablets (720 mg) by mouth every 12 hours    NOVOLOG FLEXPEN 100 UNIT/ML soln Administer 1 U Novolog per 10 grams CHO for all meals and snacks. Total daily insulin dose up to 45 U.    omeprazole (PRILOSEC) 20 MG CR capsule Take 1 capsule (20 mg) by mouth 2 times daily    oxyCODONE IR (ROXICODONE) 5 MG tablet Take 1 tablet (5 mg) by mouth every 4 hours as needed for pain maximum 6 tablet(s) per day    patiromer (VELTASSA) 8.4 g packet Take 16.8 g by mouth daily    predniSONE (DELTASONE) 2.5 MG tablet Take 1 tablet (2.5 mg) by mouth daily    tacrolimus (GENERIC EQUIVALENT) 1 MG capsule Take 6mg (6capsules) every morning, and take 5mg (5capsules) every evening, take every 12 hours.    ursodiol 500 MG TABS Take 500 mg by mouth 2 times daily          Past Medical History:   Diagnosis Date     Depressive disorder, not elsewhere classified      Diabetes type 2, controlled (H) 11/10/2016     Esophageal reflux      Fibromyalgia 1/2009    dx with Dr Benitez( Rheum)     Gangrene of finger (H) 8/25/2017      H/O deep venous thrombosis 11/2001    Permanent IVC filter in place.     H/O CASTAÑEDA (nonalcoholic steatohepatitis)      H/O Pneumonia, organism unspecified(486) 10/2001    Included ARDS, sepsis, and  acute renal failure; hospitalized, required tracheostomy placement.     H/O: HTN (hypertension) 11/2001    No longer prescribed antihypertensive medication.       History of CVA (cerebrovascular accident)      History of hepatocellular carcinoma      History of liver transplant (H)      History of obstructive sleep apnea     No longer wears CPAP since losing approximately 200 pounds with his liver transplant and its complications.       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     Osteoarthritis      Presence of PERMANENT IVC filter      Rheumatoid arthritis(714.0)    DEXA scan normal prior to transplant, in 2016    Past Surgical History:   Procedure Laterality Date     BENCH LIVER N/A 3/4/2017    Procedure: BENCH LIVER;  Surgeon: Jovan Tran MD;  Location:  OR      TOTAL KNEE ARTHROPLASTY  2008    Right knee arthroscopy     CHOLECYSTECTOMY       COLONOSCOPY N/A 7/21/2017    Procedure: COMBINED COLONOSCOPY, SINGLE OR MULTIPLE BIOPSY/POLYPECTOMY BY BIOPSY;  Colonoscopy;  Surgeon: Izaiah Montes MD;  Location:  GI     ENDOSCOPIC RETROGRADE CHOLANGIOPANCREATOGRAM N/A 5/22/2018    Procedure: COMBINED ENDOSCOPIC RETROGRADE CHOLANGIOPANCREATOGRAPHY, PLACE TUBE/STENT;  Endoscopic Retrograde Cholangiopancreatography with sphincterotomy and pancreatic duct stent placement;  Surgeon: Zay Gaitan MD;  [facility]     ENT SURGERY      Repair of deviated septum     ESOPHAGOSCOPY, GASTROSCOPY, DUODENOSCOPY (EGD), COMBINED N/A 8/4/2016    Procedure: COMBINED ESOPHAGOSCOPY, GASTROSCOPY, DUODENOSCOPY (EGD), BIOPSY SINGLE OR MULTIPLE;   Surgeon: Trent Pederson MD;  Location:  GI     ESOPHAGOSCOPY, GASTROSCOPY, DUODENOSCOPY (EGD), COMBINED N/A 2017    Procedure: COMBINED ESOPHAGOSCOPY, GASTROSCOPY, DUODENOSCOPY (EGD);;  Surgeon: Los Wynn MD;  [facility]     INCISION AND DRAINAGE ABDOMEN WASHOUT, COMBINED Right 2018    Procedure: COMBINED INCISION AND DRAINAGE ABDOMEN WASHOUT;  COMBINED INCISION AND DRAINAGE right ABDOMEN flank;  Surgeon: Sara Dinh MD;  [facility]     LAPAROTOMY EXPLORATORY N/A 2017    Procedure: LAPAROTOMY EXPLORATORY;  Exploratory Laparotomy, washout;  Surgeon: Tip Zhang MD;  [facility]     LAPAROTOMY EXPLORATORY N/A 2017    Procedure: LAPAROTOMY EXPLORATORY;  Exploratory Laparotomy, Washout with closure.;  Surgeon: Tip Zhang MD;  [facility]     LAPAROTOMY EXPLORATORY N/A 2017    Procedure: LAPAROTOMY EXPLORATORY;  Exploratory Laparotomy, Right angelique-colectomy, end ileostomy, mucosal fistula, partial omentectomy;  Surgeon: Sara Dinh MD;  [facility]     ORTHOPEDIC SURGERY Right     Repair of dislocated wrist.       RELEASE TRIGGER FINGER Right 11/10/2017    Procedure: RELEASE TRIGGER FINGER;  Debride, V-Y Flap Right Index Finger;  Surgeon: Momo Noonan MD;  [facility]     TAKEDOWN ILEOSTOMY N/A 2018    Procedure: TAKEDOWN ILEOSTOMY;  Exploratory Laparotomy, Lysis of Adhesions, Takedown Of End Ileostomy, Takedown of mucocutaneous fistula, ileocolic resection  And Colorectal Anastomosis, Primary repair of Ventral Hernia, Anesthesia Block ;  Surgeon: Sara Dinh MD;  [facility]     TRACHEOSTOMY  2001    During hospitalization for pneumonia.       TRANSPLANT LIVER RECIPIENT  DONOR N/A 3/4/2017    Procedure: TRANSPLANT LIVER RECIPIENT  DONOR;  Surgeon: Jovan Tran MD;  [facility]     Review of Systems     Constitutional: weight stable (the patient  attributes the weight gain noticed today to boots and clothing). Appetite is good   Head: no headache   Eye: no vision change/ loss of peripheral vision.   ENT: No throat congestion.   Respiratory: no cough   Cardiovascular: no chest pain or tachycardia  Gastrointestinal: Negative for vomiting, abdominal pain and diarrhea.  Genitourinary: No bladder problems.  Musculoskeletal: Negative for myalgias. No weakness. Diffuse joint pain - mainly back pain, elbows and knees.   Neurological: Negative for seizures and headaches. Muscle cramps - mainly hands. Headaches a couple of times of week, for the last 25 years and unchanged  Psychiatric/Behavioral: Negative for behavioral problem and dysphoric mood.    Past Medical History:   Diagnosis Date     Depressive disorder, not elsewhere classified      Diabetes type 2, controlled (H) 11/10/2016     Esophageal reflux      Fibromyalgia 1/2009    dx with Dr Benitez( Rheum)     Gangrene of finger (H) 8/25/2017     H/O deep venous thrombosis 11/2001    Permanent IVC filter in place.     H/O CASTAÑEDA (nonalcoholic steatohepatitis)      H/O Pneumonia, organism unspecified(486) 10/2001    Included ARDS, sepsis, and  acute renal failure; hospitalized, required tracheostomy placement.     H/O: HTN (hypertension) 11/2001    No longer prescribed antihypertensive medication.       History of CVA (cerebrovascular accident)      History of hepatocellular carcinoma      History of liver transplant (H)      History of obstructive sleep apnea     No longer wears CPAP since losing approximately 200 pounds with his liver transplant and its complications.       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     Osteoarthritis      Presence of PERMANENT IVC filter      Rheumatoid arthritis(714.0)        Past Surgical History:   Procedure  Laterality Date     BENCH LIVER N/A 3/4/2017    Procedure: BENCH LIVER;  Surgeon: Jovan Tran MD;  [facility]     C TOTAL KNEE ARTHROPLASTY  2008    Right knee arthroscopy     CHOLECYSTECTOMY       COLONOSCOPY N/A 7/21/2017    Procedure: COMBINED COLONOSCOPY, SINGLE OR MULTIPLE BIOPSY/POLYPECTOMY BY BIOPSY;  Colonoscopy;  Surgeon: Izaiah Montes MD;  [facility]     ENDOSCOPIC RETROGRADE CHOLANGIOPANCREATOGRAM N/A 5/22/2018    Procedure: COMBINED ENDOSCOPIC RETROGRADE CHOLANGIOPANCREATOGRAPHY, PLACE TUBE/STENT;  Endoscopic Retrograde Cholangiopancreatography with sphincterotomy and pancreatic duct stent placement;  Surgeon: Zay Gaitan MD;  [facility]     ENT SURGERY      Repair of deviated septum     ESOPHAGOSCOPY, GASTROSCOPY, DUODENOSCOPY (EGD), COMBINED N/A 8/4/2016    Procedure: COMBINED ESOPHAGOSCOPY, GASTROSCOPY, DUODENOSCOPY (EGD), BIOPSY SINGLE OR MULTIPLE;  Surgeon: Trent Pederson MD;  Location:  GI     ESOPHAGOSCOPY, GASTROSCOPY, DUODENOSCOPY (EGD), COMBINED N/A 8/27/2017    Procedure: COMBINED ESOPHAGOSCOPY, GASTROSCOPY, DUODENOSCOPY (EGD);;  Surgeon: Los Wynn MD;  [facility]     INCISION AND DRAINAGE ABDOMEN WASHOUT, COMBINED Right 2/14/2018    Procedure: COMBINED INCISION AND DRAINAGE ABDOMEN WASHOUT;  COMBINED INCISION AND DRAINAGE right ABDOMEN flank;  Surgeon: Sara Dinh MD;  [facility]     LAPAROTOMY EXPLORATORY N/A 7/4/2017    Procedure: LAPAROTOMY EXPLORATORY;  Exploratory Laparotomy, washout;  Surgeon: Tip Zhang MD;  [facility]     LAPAROTOMY EXPLORATORY N/A 7/5/2017    Procedure: LAPAROTOMY EXPLORATORY;  Exploratory Laparotomy, Washout with closure.;  Surgeon: Tip Zhang MD;  [facility]     LAPAROTOMY EXPLORATORY N/A 7/26/2017    Procedure: LAPAROTOMY EXPLORATORY;  Exploratory Laparotomy, Right angelique-colectomy, end ileostomy, mucosal fistula, partial omentectomy;  Surgeon:  Sara Dinh MD;  [facility]     ORTHOPEDIC SURGERY Right     Repair of dislocated wrist.       RELEASE TRIGGER FINGER Right 11/10/2017    Procedure: RELEASE TRIGGER FINGER;  Debride, V-Y Flap Right Index Finger;  Surgeon: Momo Noonan MD;  [facility]     TAKEDOWN ILEOSTOMY N/A 2018    Procedure: TAKEDOWN ILEOSTOMY;  Exploratory Laparotomy, Lysis of Adhesions, Takedown Of End Ileostomy, Takedown of mucocutaneous fistula, ileocolic resection  And Colorectal Anastomosis, Primary repair of Ventral Hernia, Anesthesia Block ;  Surgeon: Sara Dinh MD;  [facility]     TRACHEOSTOMY  2001    During hospitalization for pneumonia.       TRANSPLANT LIVER RECIPIENT  DONOR N/A 3/4/2017    Procedure: TRANSPLANT LIVER RECIPIENT  DONOR;  Surgeon: Jovan Tran MD;  [facility]       Family History   Problem Relation Age of Onset     Diabetes Father      Hypertension Father      Substance Abuse Father      Arthritis Mother      Thyroid Cancer Mother      Survivor!     Cervical Cancer Maternal Grandmother      Cerebrovascular Disease Maternal Grandfather      No Known Problems Paternal Grandmother      Prostate Cancer Paternal Grandfather      Colon Cancer No family hx of      Hyperlipidemia No family hx of      Coronary Artery Disease No family hx of      Breast Cancer No family hx of        Social history:  .  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  Former smoker for 2 years, 0.75 packs/day, quit in .  He used to chew tobacco and he stopped this in .  He has not been drinking any alcohol since the liver transplant.    Objective:   There were no vitals taken for this visit.  Wt Readings from Last 10 Encounters:   18 111.1 kg (245 lb)   18 101.3 kg (223 lb 6.4 oz)   18 102.9 kg (226 lb 12.8 oz)   18 102.2 kg (225 lb 6.4 oz)   09/10/18 102.5 kg (225 lb 14.4 oz)   18 101.9 kg (224 lb 9.6 oz)   18 101.2  "kg (223 lb)   07/06/18 101.2 kg (223 lb)   06/06/18 100.6 kg (221 lb 11.2 oz)   05/22/18 98.1 kg (216 lb 4.3 oz)     /75  Pulse 74  Ht 1.778 m (5' 10\")  Wt 111.1 kg (245 lb)  BMI 35.15 kg/m2    General: well developed, well nourished, in NAD  Eyes: anicteric, normal extra-occular movements, no lid lag, no stare  HEENT: MMM, oropharynx clear, no LAD, no thyrmegaly   CV: RRR, normal S1/S2, no MRG  Pulm: CTAB, normal breathing sounds  Abd: soft, non tender, non distended, multiple healed surgical scars, no notable lipohypertrophy  Neuro: alert, CNII-XII intact, strength 5/5 in all 4 extremities  Extremities: no LE edema  Skin: No lesions noted, normal texture  Musculoskeletal: Right index finger amputation, osteoarthritic changes of the fingers    In House Labs:   Lab Results   Component Value Date    A1C 5.5 04/11/2018    A1C 5.1 01/06/2018    A1C  07/06/2017     Canceled, Test credited   Below Assay Range  NOTIFIED LEONARD ONEILL AT 0538 ON 7/6/17 BY CHRISSY      A1C 9.4 (H) 02/16/2017    A1C 6.2 (H) 12/14/2016    HEMOGLOBINA1 6.1 (A) 11/19/2018       Hemoglobin   Date Value Ref Range Status   11/06/2018 10.7 (L) 13.3 - 17.7 g/dL Final     Hematocrit   Date Value Ref Range Status   11/06/2018 31.8 (L) 40.0 - 53.0 % Final     Cholesterol   Date Value Ref Range Status   04/02/2018 97 <200 mg/dL Final     Cholesterol/HDL Ratio   Date Value Ref Range Status   07/03/2012 3.8 0.0 - 5.0 Final     HDL Cholesterol   Date Value Ref Range Status   04/02/2018 36 (L) >39 mg/dL Final     LDL Cholesterol Calculated   Date Value Ref Range Status   04/02/2018 39 <100 mg/dL Final     Comment:     Desirable:       <100 mg/dl     VLDL-Cholesterol   Date Value Ref Range Status   07/03/2012 36 (H) 0 - 30 mg/dL Final     Triglycerides   Date Value Ref Range Status   04/02/2018 109 <150 mg/dL Final     Albumin Urine mg/L   Date Value Ref Range Status   10/27/2017 122 mg/L Final     TSH   Date Value Ref Range Status   04/02/2018 2.74 " 0.40 - 4.00 mU/L Final         Last Basic Metabolic Panel:    Sodium   Date Value Ref Range Status   11/06/2018 139 133 - 144 mmol/L Final     Potassium   Date Value Ref Range Status   11/06/2018 4.8 3.4 - 5.3 mmol/L Final     Chloride   Date Value Ref Range Status   11/06/2018 110 (H) 94 - 109 mmol/L Final     Calcium   Date Value Ref Range Status   11/06/2018 8.4 (L) 8.5 - 10.1 mg/dL Final     Carbon Dioxide   Date Value Ref Range Status   11/06/2018 23 20 - 32 mmol/L Final     Urea Nitrogen   Date Value Ref Range Status   11/06/2018 42 (H) 7 - 30 mg/dL Final     Creatinine   Date Value Ref Range Status   11/06/2018 1.51 (H) 0.66 - 1.25 mg/dL Final     GFR Estimate   Date Value Ref Range Status   11/06/2018 48 (L) >60 mL/min/1.7m2 Final     Comment:     Non  GFR Calc     Glucose   Date Value Ref Range Status   11/06/2018 133 (H) 70 - 99 mg/dL Final       AST   Date Value Ref Range Status   11/06/2018 38 0 - 45 U/L Final     ALT   Date Value Ref Range Status   11/06/2018 51 0 - 70 U/L Final     Albumin Urine mg/g Cr   Date Value Ref Range Status   10/27/2017 132.46 (H) 0 - 17 mg/g Cr Final       Assessment/Treatment Plan:      Camacho Bhagat is a 53 year old male with    1. Type 2 Diabetes: A1c less reliable in the context of anemia.  The meter reveals a wide range of variation of the morning blood sugar, which can be related to the bedtime blood sugar or to the long-acting insulin wearing off during the night.  Discussed about the option of using a glucose sensor.  Both freestyle mikki and DEXCOM G6 options were reviewed in detail with the patient.  He is concerned about using a sensor, given the fact he is very active and he swims almost on a daily basis.  For now, he would prefer to continue to check his blood sugar using the meter.  Recommendations  Check blood glucose before meals and at bedtime  Try to administer NovoLog 15 minutes prior to eating.  (Currently, the patient administers the  injections right before he starts eating)  Continue to keep records of the blood sugar, carbohydrate intake and insulin dose administered.  Have these records sent to us in 1-2 weeks.   Flu vaccine    DM complications:  Nephropathy - + proteinuria, managed by nephrology, on losartan  Neuropathy - + sx of parasthesias, + h/o gangrene associated with critical illness and pressor use, requried wound debridement but now healed.   CVD/Lipids - not on statin but LDL in 30s, will continue to monitor.    2.  Hypertension  BP - elevated on the first check, but normalized at the end of the appointment    Orders Placed This Encounter   Procedures     C FLU VAC QUADRIVALENT SPLIT VIRUS 3+YRS IM     Hemoglobin A1c POCT     Answers for HPI/ROS submitted by the patient on 11/5/2018   General Symptoms: No  Skin Symptoms: No  HENT Symptoms: No  EYE SYMPTOMS: No  HEART SYMPTOMS: No  LUNG SYMPTOMS: No  INTESTINAL SYMPTOMS: No  URINARY SYMPTOMS: No  REPRODUCTIVE SYMPTOMS: No  SKELETAL SYMPTOMS: Yes  BLOOD SYMPTOMS: Yes  NERVOUS SYSTEM SYMPTOMS: Yes  MENTAL HEALTH SYMPTOMS: No  Back pain: Yes  Muscle aches: No  Neck pain: Yes  Swollen joints: Yes  Joint pain: Yes  Bone pain: No  Muscle cramps: No  Muscle weakness: No  Joint stiffness: Yes  Bone fracture: No  Anemia: Yes  Swollen glands: No  Easy bleeding or bruising: No  Edema or swelling: No  Trouble with coordination: No  Fainting or black-out spells: No  Memory loss: No  Headache: Yes  Seizures: No  Speech problems: No  Tingling: No  Tremor: No  Weakness: No  Difficulty walking: No  Paralysis: No  Numbness: No

## 2018-11-19 NOTE — MR AVS SNAPSHOT
After Visit Summary   11/19/2018    Camacho Bhagat    MRN: [id number]           Patient Information     Date Of Birth          1965        Visit Information        Provider Department      11/19/2018 9:30 AM Alisa West MD [facility]        Today's Diagnoses     Type 2 diabetes mellitus with diabetic nephropathy, with long-term current use of insulin (H)    -  1    Type 2 diabetes mellitus not at goal (H)        Benign essential hypertension           Follow-ups after your visit        Follow-up notes from your care team     Return in about 6 months (around 5/19/2019) for phone apt in 2 weeks, recheck BP.      Your next 10 appointments already scheduled     Dec 05, 2018 12:45 PM CST   (Arrive by 12:30 PM)   Return Liver Transplant with Toñito Camejo MD   [facility] ([facility])    [address] [place] [address]  [address] [place] [address]   [phone]            Dec 05, 2018  1:00 PM CST   (Arrive by 12:30 PM)   Return Visit with Cinda Cazares NP   [facility] ([facility])    [address] [place] [address]  [address] [place] [id number]   [phone]            Dec 06, 2018 11:30 AM CST   Telephone Call with Alisa West MD   [facility] ([facility])    [address] [place] [address]  3rd Floor  [facility] [id number] [phone]           Note: this is not an onsite visit; there is no need to come to the facility.  Thank you for choosing [facility] for your health care needs. Below is some information for patients who are interested in having their follow-up visit with a physician by telephone. In some cases, a telephone visit can be an effective and convenient way to manage your follow-up care. Choosing a telephone visit rather than a face to face visit for your follow-up care is a decision that you and your physician can make  together to ensure it meets all of your needs.  A face to face visit is always an available option, if you choose to do so.  We want to make sure you have all of the information you need about the telephone visit option and answer all of your questions before you decide to schedule a telephone follow-up visit. If you have any questions, you may talk to a staff member or our financial counselor at [phone].  1. General overview Our clinic sees patients for a variety of conditions and concerns. A face to face visit with your doctor is required for any new concerns or for your initial visit. If you and your doctor decide that a follow up visit by telephone is appropriate, you may decide to opt for a telephone visit.   2. Billing and insurance coverage There is a charge for telephone visits, similar to the charge for an in-person visit. Your bill is based on the amount of time you and your physician are on the phone. We will bill each visit to your insurance company (just like your other medical visits), and you will be responsible for any costs not paid by your insurance company. The charge may be denied by your insurance company, in which case you will be responsible for the entire amount billed. The decision to cover the cost is determined by your insurance company. If you want to know what your insurance company will cover, we encourage you to contact them to determine your coverage. The codes below are the codes we use when billing for telephone visits and the associated charges. This may help you work with your insurance company to determine your benefits.   Billing CPT codes for Telephone visits  01204 ($30), 04078 ($35), 43790 ($40)            May 13, 2019 10:00 AM CDT   (Arrive by 9:45 AM)   RETURN ENDOCRINE with Alisa West MD   [facility] ([facility])    [address] [facility]  3rd [facility] [address] [phone]              Who to contact   "   Please call your clinic at [phone] to:    Ask questions about your health    Make or cancel appointments    Discuss your medicines    Learn about your test results    Speak to your doctor            Additional Information About Your Visit        [employer] Information     [employer] gives you secure access to your electronic health record. If you see a primary care provider, you can also send messages to your care team and make appointments. If you have questions, please call your primary care clinic.  If you do not have a primary care provider, please call [phone] and they will assist you.      [employer] is an electronic gateway that provides easy, online access to your medical records. With [employer], you can request a clinic appointment, read your test results, renew a prescription or communicate with your care team.     To access your existing account, please contact your [facility] or call [phone] for assistance.        Care EveryWhere ID     This is your Care EveryWhere ID. This could be used by other organizations to access your [facility] medical records  [id number]        Your Vitals Were     Pulse Height BMI (Body Mass Index)             74 1.778 m (5' 10\") 35.15 kg/m2          Blood Pressure from Last 3 Encounters:   11/19/18 129/78   09/17/18 144/77   09/14/18 146/79    Weight from Last 3 Encounters:   11/19/18 111.1 kg (245 lb)   09/19/18 101.3 kg (223 lb 6.4 oz)   09/17/18 102.9 kg (226 lb 12.8 oz)              We Performed the Following     C FLU VAC QUADRIVALENT SPLIT VIRUS 3+YRS IM     Hemoglobin A1c POCT          Today's Medication Changes          These changes are accurate as of 11/19/18 11:59 PM.  If you have any questions, ask your nurse or doctor.               Start taking these medicines.        Dose/Directions    Insulin Lispro 200 UNIT/ML soln   Commonly known as:  HUMALOG KWIKPEN   Used for:  Type 2 diabetes mellitus with diabetic nephropathy, with " long-term current use of insulin (H)   Replaces:  HumaLOG KWIKpen 100 UNIT/ML injection        Use one unit per 3 grams carbohydrates for all meals and snacks. Total daily dose up to 80 U.   Quantity:  72 mL   Refills:  3       insulin pen needle 32G X 4 MM miscellaneous   Commonly known as:  BD ENE U/F        Use 1 pen needle 4 times daily or as directed.   Quantity:  400 each   Refills:  3         These medicines have changed or have updated prescriptions.        Dose/Directions    cholecalciferol 1000 UNIT tablet   Commonly known as:  vitamin D3   This may have changed:  when to take this   Used for:  Vitamin D deficiency        Dose:  1000 Units   Take 1 tablet (1,000 Units) by mouth daily   Quantity:  100 tablet   Refills:  3       predniSONE 2.5 MG tablet   Commonly known as:  DELTASONE   This may have changed:  when to take this   Used for:  Liver replaced by transplant (H), Long-term use of immunosuppressant medication        Dose:  2.5 mg   Take 1 tablet (2.5 mg) by mouth daily   Quantity:  90 tablet   Refills:  3         Stop taking these medicines if you haven't already. Please contact your care team if you have questions.     HumaLOG KWIKpen 100 UNIT/ML injection   Generic drug:  insulin lispro   Replaced by:  Insulin Lispro 200 UNIT/ML soln           linagliptin 5 MG Tabs tablet   Commonly known as:  TRADJENTA           NovoLOG FLEXPEN 100 UNIT/ML injection   Generic drug:  insulin aspart                Where to get your medicines      These medications were sent to [facility]/pharmacy #[id number] - [place] - [address]  [address] [facility] [address]     Phone:  [phone]     Insulin Lispro 200 UNIT/ML soln    insulin pen needle 32G X 4 MM miscellaneous                Primary Care Provider Office Phone # Fax #    Shay Kirkpatrick [phone]       [address] [place] [address]  [facility] [address]        Equal Access to Services     FRANKLIN PORTILLO AH: troy Ospina  alek noelkeshiafranck kelsidevi brown. So [facility] [phone].    ATENCIÓN: Si brandt garcia, tiene a zheng disposición servicios gratuitos de asistencia lingüística. Shahla al [phone].    We comply with applicable federal civil rights laws and [place] laws. We do not discriminate on the basis of race, color, national origin, age, disability, sex, sexual orientation, or gender identity.            Thank you!     Thank you for choosing [facility]  for your care. Our goal is always to provide you with excellent care. Hearing back from our patients is one way we can continue to improve our services. Please take a few minutes to complete the written survey that you may receive in the mail after your visit with us. Thank you!             Your Updated Medication List - Protect others around you: Learn how to safely use, store and throw away your medicines at www.disposemymeds.org.          This list is accurate as of 11/19/18 11:59 PM.  Always use your most recent med list.                   Brand Name Dispense Instructions for use Diagnosis    amLODIPine 10 MG tablet    NORVASC    90 tablet    Take 1 tablet (10 mg) by mouth daily    HTN (hypertension)       BLOOD GLUCOSE TEST STRIPS Strp     100 each    1 strip by Lancet route 4 times daily    Type 2 diabetes mellitus with diabetic polyneuropathy, with long-term current use of insulin (H)       cholecalciferol 1000 UNIT tablet    vitamin D3    100 tablet    Take 1 tablet (1,000 Units) by mouth daily    Vitamin D deficiency       CIALIS 5 MG tablet   Generic drug:  tadalafil      Take 5 mg by mouth as needed        cyclobenzaprine 10 MG tablet    FLEXERIL    90 tablet    Take 1 tablet (10 mg) by mouth 3 times daily as needed for muscle spasms    Immunosuppression (H)       furosemide 40 MG tablet    LASIX    60 tablet    Take 1 tablet (40 mg) by mouth 2 times daily    Hyperkalemia, Persistent proteinuria, Benign  essential hypertension       gabapentin 300 MG capsule    NEURONTIN    270 capsule    Take 1 capsule (300 mg) by mouth 3 times daily    Low back pain at multiple sites       insulin glargine 100 UNIT/ML injection    LANTUS    18 mL    Inject 50 Units Subcutaneous every morning    Type 2 diabetes mellitus with diabetic polyneuropathy, with long-term current use of insulin (H)       Insulin Lispro 200 UNIT/ML soln    HUMALOG KWIKPEN    72 mL    Use one unit per 3 grams carbohydrates for all meals and snacks. Total daily dose up to 80 U.    Type 2 diabetes mellitus with diabetic nephropathy, with long-term current use of insulin (H)       insulin pen needle 32G X 4 MM miscellaneous    BD ENE U/F    400 each    Use 1 pen needle 4 times daily or as directed.        losartan 50 MG tablet    COZAAR    90 tablet    Take 1 tablet (50 mg) by mouth daily    Persistent proteinuria, Hyperkalemia, Benign essential hypertension       metoprolol succinate 100 MG 24 hr tablet    TOPROL-XL    90 tablet    Take 1 tablet (100 mg) by mouth daily    Benign essential hypertension       Multi-vitamin Tabs tablet      Take 1 tablet by mouth every morning        mycophenolic acid 360 MG EC tablet    GENERIC EQUIVALENT    360 tablet    Take 2 tablets (720 mg) by mouth every 12 hours    History of liver transplant (H), Long-term use of immunosuppressant medication       omeprazole 20 MG CR capsule    priLOSEC    60 capsule    Take 1 capsule (20 mg) by mouth 2 times daily    Long-term use of immunosuppressant medication, History of liver transplant (H)       oxyCODONE IR 5 MG tablet    ROXICODONE    30 tablet    Take 1 tablet (5 mg) by mouth every 4 hours as needed for pain maximum 6 tablet(s) per day    Primary osteoarthritis involving multiple joints, Rheumatoid arthritis involving multiple sites, unspecified rheumatoid factor presence (H)       patiromer 8.4 g packet    VELTASSA    180 each    Take 16.8 g by mouth daily    Hyperkalemia        predniSONE 2.5 MG tablet    DELTASONE    90 tablet    Take 1 tablet (2.5 mg) by mouth daily    Liver replaced by transplant (H), Long-term use of immunosuppressant medication       tacrolimus 1 MG capsule    GENERIC EQUIVALENT    330 capsule    Take 6mg (6capsules) every morning, and take 5mg (5capsules) every evening, take every 12 hours.    Liver transplant recipient (H)       ursodiol 500 MG tablet    ACTIGALL    180 tablet    Take 500 mg by mouth 2 times daily    Liver replaced by transplant (H)

## 2018-11-20 ENCOUNTER — TELEPHONE (OUTPATIENT)
Dept: TRANSPLANT | Facility: CLINIC | Age: 53
End: 2018-11-20

## 2018-11-20 NOTE — TELEPHONE ENCOUNTER
Patient Call: General  Route to LPN    Reason for call: wants to know if it is ok to take supplements with his meds    Call back needed? Yes    Return Call Needed  Same as documented in contacts section  When to return call?: Greater than one day: Route standard priority

## 2018-11-20 NOTE — TELEPHONE ENCOUNTER
Camacho wants to know if it is okay for him to take B12, Folic acid, and zinc supplements. I told him I would ask his coordinator and get back to him

## 2018-11-21 NOTE — TELEPHONE ENCOUNTER
Talked to Camacho about this and recommended that he talk with his PCP. He said he as an appointment Dec 5th with him and will talk to him about it then.

## 2018-11-27 ENCOUNTER — HOSPITAL ENCOUNTER (OUTPATIENT)
Dept: LAB | Facility: CLINIC | Age: 53
Discharge: HOME | End: 2018-11-27
Attending: INTERNAL MEDICINE | Admitting: INTERNAL MEDICINE
Payer: COMMERCIAL

## 2018-11-27 DIAGNOSIS — Z94.4 LIVER REPLACED BY TRANSPLANT (H): ICD-10-CM

## 2018-11-27 DIAGNOSIS — M06.9 RHEUMATOID ARTHRITIS INVOLVING MULTIPLE SITES, UNSPECIFIED RHEUMATOID FACTOR PRESENCE: ICD-10-CM

## 2018-11-27 DIAGNOSIS — E87.5 HYPERKALEMIA: ICD-10-CM

## 2018-11-27 DIAGNOSIS — M15.0 PRIMARY OSTEOARTHRITIS INVOLVING MULTIPLE JOINTS: ICD-10-CM

## 2018-11-27 DIAGNOSIS — N18.30 CKD (CHRONIC KIDNEY DISEASE) STAGE 3, GFR 30-59 ML/MIN (H): ICD-10-CM

## 2018-11-27 LAB
ALBUMIN SERPL-MCNC: 3.5 G/DL (ref 3.4–5)
ALP SERPL-CCNC: 277 U/L (ref 40–150)
ALT SERPL W P-5'-P-CCNC: 57 U/L (ref 0–70)
ANION GAP SERPL CALCULATED.3IONS-SCNC: 6 MMOL/L (ref 3–14)
AST SERPL W P-5'-P-CCNC: 37 U/L (ref 0–45)
BILIRUB DIRECT SERPL-MCNC: 0.3 MG/DL (ref 0–0.2)
BILIRUB SERPL-MCNC: 0.7 MG/DL (ref 0.2–1.3)
BUN SERPL-MCNC: 44 MG/DL (ref 7–30)
CALCIUM SERPL-MCNC: 8.4 MG/DL (ref 8.5–10.1)
CHLORIDE SERPL-SCNC: 110 MMOL/L (ref 94–109)
CO2 SERPL-SCNC: 23 MMOL/L (ref 20–32)
CREAT SERPL-MCNC: 1.43 MG/DL (ref 0.66–1.25)
CREAT UR-MCNC: 96 MG/DL
ERYTHROCYTE [DISTWIDTH] IN BLOOD BY AUTOMATED COUNT: 14.1 % (ref 10–15)
GFR SERPL CREATININE-BSD FRML MDRD: 52 ML/MIN/1.7M2
GLUCOSE SERPL-MCNC: 118 MG/DL (ref 70–99)
HCT VFR BLD AUTO: 33.9 % (ref 40–53)
HGB BLD-MCNC: 10.9 G/DL (ref 13.3–17.7)
MCH RBC QN AUTO: 31 PG (ref 26.5–33)
MCHC RBC AUTO-ENTMCNC: 32.2 G/DL (ref 31.5–36.5)
MCV RBC AUTO: 96 FL (ref 78–100)
PHOSPHATE SERPL-MCNC: 2.7 MG/DL (ref 2.5–4.5)
PLATELET # BLD AUTO: 80 10E9/L (ref 150–450)
POTASSIUM SERPL-SCNC: 5 MMOL/L (ref 3.4–5.3)
PROT SERPL-MCNC: 6.7 G/DL (ref 6.8–8.8)
PROT UR-MCNC: 0.3 G/L
PROT/CREAT 24H UR: 0.31 G/G CR (ref 0–0.2)
RBC # BLD AUTO: 3.52 10E12/L (ref 4.4–5.9)
SODIUM SERPL-SCNC: 139 MMOL/L (ref 133–144)
TACROLIMUS BLD-MCNC: 6.8 UG/L (ref 5–15)
TME LAST DOSE: NORMAL H
WBC # BLD AUTO: 3.8 10E9/L (ref 4–11)

## 2018-11-27 PROCEDURE — 82248 BILIRUBIN DIRECT: CPT

## 2018-11-27 PROCEDURE — 80069 RENAL FUNCTION PANEL: CPT

## 2018-11-27 PROCEDURE — 84156 ASSAY OF PROTEIN URINE: CPT

## 2018-11-27 PROCEDURE — 36415 COLL VENOUS BLD VENIPUNCTURE: CPT | Performed by: INTERNAL MEDICINE

## 2018-11-27 PROCEDURE — 85027 COMPLETE CBC AUTOMATED: CPT

## 2018-11-27 PROCEDURE — 84460 ALANINE AMINO (ALT) (SGPT): CPT

## 2018-11-27 PROCEDURE — 82247 BILIRUBIN TOTAL: CPT

## 2018-11-27 PROCEDURE — 84155 ASSAY OF PROTEIN SERUM: CPT

## 2018-11-27 PROCEDURE — 80197 ASSAY OF TACROLIMUS: CPT | Performed by: INTERNAL MEDICINE

## 2018-11-27 PROCEDURE — 84075 ASSAY ALKALINE PHOSPHATASE: CPT

## 2018-11-27 PROCEDURE — 36415 COLL VENOUS BLD VENIPUNCTURE: CPT

## 2018-11-27 PROCEDURE — 84450 TRANSFERASE (AST) (SGOT): CPT

## 2018-11-27 NOTE — TELEPHONE ENCOUNTER
oxyCODONE IR (ROXICODONE) 5 MG tablet.      Last Written Prescription Date:  9-17-18  Last Fill Quantity: 30,   # refills: 0  Last Office Visit : 9-17-18  Future Office visit:  none    Routing refill request to provider for review/approval because:  Controlled medication.      Kathleen M Doege RN

## 2018-11-27 NOTE — TELEPHONE ENCOUNTER
[facility] Call Center    Phone Message    May a detailed message be left on voicemail: yes    Reason for Call: Medication Refill Request    Has the patient contacted the pharmacy for the refill? Yes   Name of medication being requested:  oxyCODONE IR (ROXICODONE) 5 MG tablet.  Provider who prescribed the medication: Dr.Sick  Pharmacy: [facility]  Date medication is needed: 12/5/18    Action Taken: Message routed to:  [facility] Med refill

## 2018-11-29 ENCOUNTER — MYC MEDICAL ADVICE (OUTPATIENT)
Dept: INTERNAL MEDICINE | Facility: CLINIC | Age: 53
End: 2018-11-29

## 2018-11-30 DIAGNOSIS — N18.30 CKD (CHRONIC KIDNEY DISEASE) STAGE 3, GFR 30-59 ML/MIN (H): Primary | ICD-10-CM

## 2018-11-30 RX ORDER — OXYCODONE HYDROCHLORIDE 5 MG/1
5 TABLET ORAL EVERY 4 HOURS PRN
Qty: 30 TABLET | Refills: 0 | Status: SHIPPED | OUTPATIENT
Start: 2018-11-30 | End: 2019-03-11

## 2018-12-05 ENCOUNTER — OFFICE VISIT (OUTPATIENT)
Dept: GASTROENTEROLOGY | Facility: CLINIC | Age: 53
End: 2018-12-05
Attending: INTERNAL MEDICINE
Payer: COMMERCIAL

## 2018-12-05 ENCOUNTER — OFFICE VISIT (OUTPATIENT)
Dept: NEPHROLOGY | Facility: CLINIC | Age: 53
End: 2018-12-05
Payer: COMMERCIAL

## 2018-12-05 ENCOUNTER — MYC MEDICAL ADVICE (OUTPATIENT)
Dept: ENDOCRINOLOGY | Facility: CLINIC | Age: 53
End: 2018-12-05

## 2018-12-05 VITALS
HEIGHT: 72 IN | HEART RATE: 75 BPM | WEIGHT: 220 LBS | TEMPERATURE: 98.2 F | BODY MASS INDEX: 29.8 KG/M2 | DIASTOLIC BLOOD PRESSURE: 75 MMHG | SYSTOLIC BLOOD PRESSURE: 143 MMHG

## 2018-12-05 VITALS
DIASTOLIC BLOOD PRESSURE: 83 MMHG | SYSTOLIC BLOOD PRESSURE: 137 MMHG | HEIGHT: 72 IN | BODY MASS INDEX: 29.8 KG/M2 | WEIGHT: 220 LBS | HEART RATE: 83 BPM | TEMPERATURE: 98.2 F

## 2018-12-05 DIAGNOSIS — Z94.4 LIVER REPLACED BY TRANSPLANT (H): Primary | ICD-10-CM

## 2018-12-05 DIAGNOSIS — E87.5 HYPERKALEMIA: ICD-10-CM

## 2018-12-05 DIAGNOSIS — N18.30 CKD (CHRONIC KIDNEY DISEASE) STAGE 3, GFR 30-59 ML/MIN (H): Primary | ICD-10-CM

## 2018-12-05 DIAGNOSIS — I10 BENIGN ESSENTIAL HYPERTENSION: ICD-10-CM

## 2018-12-05 PROCEDURE — G0463 HOSPITAL OUTPT CLINIC VISIT: HCPCS | Mod: ZF

## 2018-12-05 ASSESSMENT — PAIN SCALES - GENERAL
PAINLEVEL: SEVERE PAIN (7)
PAINLEVEL: SEVERE PAIN (7)

## 2018-12-05 NOTE — LETTER
12/5/2018      RE: Camacho Bhagat  [address]  [facility] [address]       Nephrology Clinic Visit 12/5/18    Assessment and Plan:       1. CKD3 w/mild proteinuria - Stable. Creat 1.4, eGFR 52 ml/mn. UPCR 0.3 g/gCr on 11/18.   Baseline in the mid 1 range  Etiology of CKD likely multifactorial; DM, recurrent EJ, CNI.    - Had been intolerant of ACE/ARB previously given problems with hyperkalemia assoc with Tacrolimus, but retrial when TAC level was <0.3 did not result in Hyperkalemia. In fact Valtessa had been discontinued because he was becoming hypokalemic.    - Patient was restarted on ARB in 4/18 for unexplained nephrotic range proteinuria following ostomy takedown with improvement in UPCR, but then developed mild acute rejection and Tac dose was increased resulting in hyperkalemia. He was restarted on Valtassa with improvement in hyperkalemia   - Blood pressures now at goal.    - Does not use NSAIDs.    - Lab frequency per Transplant   - A1c goal is 7%. HgA1c 6.1% Nov 2018      2. HTN - Controlled. Home blood pressures 120-140/. Clinic blood pressure today 137/83. HR 83. No edema. No dyspnea or CP.   Current antihypertensive regimen:      Lasix 40 mg bid     Amlodipine 10 mg qd      Toprol  mg every day       Losartan 50 mg qd    - Continue labs per transplant   - Continue to monitor blood pressures       3. Hyperkalemia assoc with Tac/ARB - Potassium running ~ 5.0 on Losartan 50 mg every day and Valtassa 16.8 g/day.    - Continue current regimen      4. Volume status - Euvolemic. No edema/dyspnea. Does have increased stools when TAC dose is increased. Weight 220#, down from 225 # last visit. Blood pressures  at goal.    - Continue Lasix 40 mg bid.       5. Acid base - No acute concerns. Metabolic acidosis resolved following ileostomy takedown. Bicarb 23.   Off supplement.       6. BMD - Ca 8.4, Phos 2.7, albumin 3.5   - Vitamin D 38 (10/18)   - PTH 22 (10/18).    - Continue Vit D 1000 U  qd      7.Anemia -Hgb 10.9.    - Iron studies 7/18: Ferritin 1338, Fe 166, IS 73%   - Had colonoscopy 2017 (poor study), Ileoscopy 8/17 - nml   - Not on iron and has not needed DIPAK      8. Liver transplant IS: Tacrolimus, MMF, Pred. Tac level 6.8   - Tacro dose increased given mild rejection in June 9. Dispo- RCT 4 months for f/u      Reason for Visit:  CKD3/HTN follow up        HPI:  Mr Bhagat is a 54 yo male with CKD3, HTN, Chronic hyperkalemia, Liver transplant, in today for CKD/HTN followup. Last seen in clinic by me on 9/14/18.  Toprol was increased to 100 mg daily. Home blood pressures now in the 120-140/ range. Baseline creat mid 1's.       Interval Hx:     No hospital admissions.       ROS:  Hurt his back over Thanksgiving. No radiculopathy. Getting a new TENS unit. Otherwise feeling well.   Denies dyspnea, CP, abdominal pain. Voiding w/o difficulty. Exercising regularly. Appetite is good.         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 Former smoker,   by profession. Working on recertification for MA position. Exercising regularly.    Family Hx:   Family History   Problem Relation Age of Onset     Diabetes Father      Hypertension Father      Substance Abuse Father      Arthritis Mother      Thyroid Cancer Mother      Survivor!     Cervical Cancer Maternal Grandmother      Cerebrovascular Disease Maternal Grandfather      No Known Problems Paternal Grandmother      Prostate Cancer Paternal Grandfather      Colon Cancer No family hx of      Hyperlipidemia No family hx of      Coronary Artery Disease No family hx of      Breast Cancer No  family hx of        Allergies:  Allergies   Allergen Reactions     Erythromycin GI Disturbance     Vioxx      Nausea, vomiting       Medications:  Current Outpatient Prescriptions   Medication Sig     amLODIPine (NORVASC) 10 MG tablet Take 1 tablet (10 mg) by mouth daily     cholecalciferol (VITAMIN D3) 1000 UNIT tablet Take 1 tablet (1,000 Units) by mouth daily (Patient taking differently: Take 1,000 Units by mouth every morning )     CIALIS 5 MG tablet Take 5 mg by mouth as needed      cyclobenzaprine (FLEXERIL) 10 MG tablet Take 1 tablet (10 mg) by mouth 3 times daily as needed for muscle spasms     furosemide (LASIX) 40 MG tablet Take 1 tablet (40 mg) by mouth 2 times daily     gabapentin (NEURONTIN) 300 MG capsule Take 1 capsule (300 mg) by mouth 3 times daily     Glucose Blood (BLOOD GLUCOSE TEST STRIPS) STRP 1 strip by Lancet route 4 times daily     insulin glargine (LANTUS) 100 UNIT/ML injection Inject 50 Units Subcutaneous every morning     Insulin Lispro (HUMALOG KWIKPEN) 200 UNIT/ML soln Use one unit per 3 grams carbohydrates for all meals and snacks. Total daily dose up to 80 U.     insulin pen needle (BD ENE U/F) 32G X 4 MM miscellaneous Use 1 pen needle 4 times daily or as directed.     losartan (COZAAR) 50 MG tablet Take 1 tablet (50 mg) by mouth daily     metoprolol succinate (TOPROL-XL) 100 MG 24 hr tablet Take 1 tablet (100 mg) by mouth daily     multivitamin, therapeutic with minerals (MULTI-VITAMIN) TABS tablet Take 1 tablet by mouth every morning     mycophenolic acid (GENERIC EQUIVALENT) 360 MG EC tablet Take 2 tablets (720 mg) by mouth every 12 hours     omeprazole (PRILOSEC) 20 MG CR capsule Take 1 capsule (20 mg) by mouth 2 times daily     oxyCODONE (ROXICODONE) 5 MG tablet Take 1 tablet (5 mg) by mouth every 4 hours as needed for pain maximum 6 tablet(s) per day     patiromer (VELTASSA) 8.4 g packet Take 16.8 g by mouth daily     predniSONE (DELTASONE) 2.5 MG tablet Take 1 tablet (2.5 mg)  by mouth daily (Patient taking differently: Take 2.5 mg by mouth every morning )     tacrolimus (GENERIC EQUIVALENT) 1 MG capsule Take 6mg (6capsules) every morning, and take 5mg (5capsules) every evening, take every 12 hours.     ursodiol 500 MG TABS Take 500 mg by mouth 2 times daily     No current facility-administered medications for this visit.       Vitals:  /83  Pulse 83  Temp 98.2  F (36.8  C) (Oral)  Ht 1.829 m (6')  Wt 99.8 kg (220 lb)  BMI 29.84 kg/m2    Exam:  Gen: Well groomed, pleasant  CARDIAC: RRR  LUNGS: CTA  ABDOMEN: Abdomin NT. Non distended.  EXT: No edema  NEURO: Alert, oriented.    LABS:   CMP  Recent Labs   Lab Test  11/27/18   1128  11/06/18   1201  10/24/18   1129  10/10/18   1059   NA  139  139  141  136   POTASSIUM  5.0  4.8  5.9*  5.0   CHLORIDE  110*  110*  111*  106   CO2  23  23  23  26   ANIONGAP  6  6  7  4   GLC  118*  133*  130*  290*   BUN  44*  42*  42*  35*   CR  1.43*  1.51*  1.48*  1.38*   GFRESTIMATED  52*  48*  50*  54*   GFRESTBLACK  62  59*  60*  65   JACOB  8.4*  8.4*  8.7  8.2*     Recent Labs   Lab Test  11/27/18   1128  11/06/18   1201  10/24/18   1129  10/10/18   1059   BILITOTAL  0.7  0.7  0.9  0.9   ALKPHOS  277*  251*  284*  255*   ALT  57  51  62  53   AST  37  38  47*  41     CBC  Recent Labs   Lab Test  11/27/18   1128  11/06/18   1201  10/24/18   1129  10/10/18   1059   HGB  10.9*  10.7*  10.9*  11.7*   WBC  3.8*  3.9*  3.5*  3.8*   RBC  3.52*  3.36*  3.40*  3.66*   HCT  33.9*  31.8*  32.4*  34.5*   MCV  96  95  95  94   MCH  31.0  31.8  32.1  32.0   MCHC  32.2  33.6  33.6  33.9   RDW  14.1  13.8  13.9  14.3   PLT  80*  77*  75*  81*     URINE STUDIES  Recent Labs   Lab Test  04/11/18   1446  02/26/18   1115  01/29/18   1235  10/27/17   0942   COLOR  Yellow  Yellow  Yellow  Yellow   APPEARANCE  Slightly Cloudy  Clear  Clear  Slightly Cloudy   URINEGLC  >499*  >499*  150*  Negative   URINEBILI  Negative  Negative  Negative  Negative   URINEKETONE   Negative  Negative  Negative  Negative   SG  1.010  1.010  1.013  1.008   UBLD  Small*  Negative  Small*  Negative   URINEPH  5.0  5.0  6.0  5.0   PROTEIN  100*  100*  >499*  10*   NITRITE  Negative  Negative  Negative  Negative   LEUKEST  Negative  Negative  Negative  Negative   RBCU  3*  2  4*  2   WBCU  <1  1  4*  6*     Recent Labs   Lab Test  11/27/18   1110  09/18/18   1200  08/17/18   1050  07/03/18   1040   UTPG  0.31*  0.28*  0.51*  0.84*     PTH  Recent Labs   Lab Test  10/10/18   1059  09/18/18   1212  09/14/17   1133   PTHI  22  28  34     IRON STUDIES  Recent Labs   Lab Test  07/10/18   1022  03/06/18   1029  02/05/18   1108   IRON  166  143  44   FEB  227*  228*  166*   IRONSAT  73*  63*  26   NELY  1338*  1362*  1014*       JUANITO Rojas NP

## 2018-12-05 NOTE — NURSING NOTE
/83  Pulse 83  Temp 98.2  F (36.8  C) (Oral)  Ht 1.829 m (6')  Wt 99.8 kg (220 lb)  BMI 29.84 kg/m2  Chief Complaint   Patient presents with     RECHECK     follow up with elevated labs, tzimmer cma

## 2018-12-05 NOTE — NURSING NOTE
Chief Complaint   Patient presents with     RECHECK     follow up with liver transplant, tzimmer cma     /75  Pulse 75  Temp 98.2  F (36.8  C) (Oral)  Ht 1.829 m (6')  Wt 99.8 kg (220 lb)  BMI 29.84 kg/m2

## 2018-12-05 NOTE — PROGRESS NOTES
HISTORY OF PRESENT ILLNESS:  I had the pleasure of seeing Camacho Bhagat for followup in the Liver Transplant Clinic at the [facility] on 12/05/2018.  Mr. Bhagat returns now almost 2 years status post liver transplantation for cirrhosis, most likely on the basis of nonalcoholic fatty liver disease.      He is doing well at this visit.  He denies any abdominal pain, itching or skin rash.  He has only mild fatigue.  He denies any increased abdominal girth or lower extremity edema.  He denies any fevers or chills, cough or shortness of breath.  He denies any nausea or vomiting, diarrhea or constipation.  His appetite has been good, and his weight has been stable.  There have been no other new events since he was last seen.      He did get a flu vaccine.     Current Outpatient Prescriptions   Medication     amLODIPine (NORVASC) 10 MG tablet     cholecalciferol (VITAMIN D3) 1000 UNIT tablet     furosemide (LASIX) 40 MG tablet     gabapentin (NEURONTIN) 300 MG capsule     Glucose Blood (BLOOD GLUCOSE TEST STRIPS) STRP     insulin glargine (LANTUS) 100 UNIT/ML injection     Insulin Lispro (HUMALOG KWIKPEN) 200 UNIT/ML soln     insulin pen needle (BD ENE U/F) 32G X 4 MM miscellaneous     losartan (COZAAR) 50 MG tablet     metoprolol succinate (TOPROL-XL) 100 MG 24 hr tablet     multivitamin, therapeutic with minerals (MULTI-VITAMIN) TABS tablet     mycophenolic acid (GENERIC EQUIVALENT) 360 MG EC tablet     omeprazole (PRILOSEC) 20 MG CR capsule     patiromer (VELTASSA) 8.4 g packet     predniSONE (DELTASONE) 2.5 MG tablet     tacrolimus (GENERIC EQUIVALENT) 1 MG capsule     ursodiol 500 MG TABS     CIALIS 5 MG tablet     cyclobenzaprine (FLEXERIL) 10 MG tablet     oxyCODONE (ROXICODONE) 5 MG tablet     No current facility-administered medications for this visit.      B/P: 143/75, T: 98.2, P: 75, R: Data Unavailable    HEENT exam shows no scleral icterus or temporal muscle wasting.  His chest is  clear.  His abdominal exam shows no increase in girth.  No masses or tenderness to palpation are present.  His liver is 10 cm in span without left lobe enlargement.  No spleen tip is palpable.  Extremity exam shows no edema.  Skin exam shows no stigmata of chronic liver disease.  Neurologic exam is nonfocal.     His most recent laboratory tests show his white count is 3.8, hemoglobin 10.9 with an MCV 96, platelet count of 80,000.  Sodium 139, potassium 5.0, BUN is 44, creatinine 1.43.  AST is 37, ALT is 57, alkaline phosphatase is 77, albumin is 3.5, total protein 6.7, his total bilirubin is 0.7.  His tacrolimus level 6.8.      IMPRESSION:  Mr. Bhagat is doing well now a year and a half status post liver transplantation.  He has not had any easy go of it, and it has been a struggle to get to this point; however, his kidney function is about as good as it has been and stable.  His liver tests are all normal with the exception of a modest elevation of alkaline phosphatase.  I will not be making any change to his medical regimen.  I will see him back in the clinic again in 6 months.  I should mention his diabetes is under good control and he is up-to-date with regard to vaccines and other cancer screening.      Thank you very much for allowing me to participate in the care of your patient.  If you have any questions regarding my recommendations, please do not hesitate to contact me.         Toñito Camejo MD      Professor of Medicine  [employer]      Executive Medical Director, Solid Organ Transplant Program  [facility]

## 2018-12-05 NOTE — LETTER
12/5/2018      RE: Camacho Bhagat  [address] [facility] [address]       HISTORY OF PRESENT ILLNESS:  I had the pleasure of seeing Camacho Bhagat for followup in the Liver Transplant Clinic at the [facility] on 12/05/2018.  Mr. Bhagat returns now almost 2 years status post liver transplantation for cirrhosis, most likely on the basis of nonalcoholic fatty liver disease.      He is doing well at this visit.  He denies any abdominal pain, itching or skin rash.  He has only mild fatigue.  He denies any increased abdominal girth or lower extremity edema.  He denies any fevers or chills, cough or shortness of breath.  He denies any nausea or vomiting, diarrhea or constipation.  His appetite has been good, and his weight has been stable.  There have been no other new events since he was last seen.      He did get a flu vaccine.     Current Outpatient Prescriptions   Medication     amLODIPine (NORVASC) 10 MG tablet     cholecalciferol (VITAMIN D3) 1000 UNIT tablet     furosemide (LASIX) 40 MG tablet     gabapentin (NEURONTIN) 300 MG capsule     Glucose Blood (BLOOD GLUCOSE TEST STRIPS) STRP     insulin glargine (LANTUS) 100 UNIT/ML injection     Insulin Lispro (HUMALOG KWIKPEN) 200 UNIT/ML soln     insulin pen needle (BD ENE U/F) 32G X 4 MM miscellaneous     losartan (COZAAR) 50 MG tablet     metoprolol succinate (TOPROL-XL) 100 MG 24 hr tablet     multivitamin, therapeutic with minerals (MULTI-VITAMIN) TABS tablet     mycophenolic acid (GENERIC EQUIVALENT) 360 MG EC tablet     omeprazole (PRILOSEC) 20 MG CR capsule     patiromer (VELTASSA) 8.4 g packet     predniSONE (DELTASONE) 2.5 MG tablet     tacrolimus (GENERIC EQUIVALENT) 1 MG capsule     ursodiol 500 MG TABS     CIALIS 5 MG tablet     cyclobenzaprine (FLEXERIL) 10 MG tablet     oxyCODONE (ROXICODONE) 5 MG tablet     No current facility-administered medications for this visit.      B/P: 143/75, T: 98.2, P: 75, R: Data  Unavailable    HEENT exam shows no scleral icterus or temporal muscle wasting.  His chest is clear.  His abdominal exam shows no increase in girth.  No masses or tenderness to palpation are present.  His liver is 10 cm in span without left lobe enlargement.  No spleen tip is palpable.  Extremity exam shows no edema.  Skin exam shows no stigmata of chronic liver disease.  Neurologic exam is nonfocal.     His most recent laboratory tests show his white count is 3.8, hemoglobin 10.9 with an MCV 96, platelet count of 80,000.  Sodium 139, potassium 5.0, BUN is 44, creatinine 1.43.  AST is 37, ALT is 57, alkaline phosphatase is 77, albumin is 3.5, total protein 6.7, his total bilirubin is 0.7.  His tacrolimus level 6.8.      IMPRESSION:  Mr. Bhagat is doing well now a year and a half status post liver transplantation.  He has not had any easy go of it, and it has been a struggle to get to this point; however, his kidney function is about as good as it has been and stable.  His liver tests are all normal with the exception of a modest elevation of alkaline phosphatase.  I will not be making any change to his medical regimen.  I will see him back in the clinic again in 6 months.  I should mention his diabetes is under good control and he is up-to-date with regard to vaccines and other cancer screening.      Thank you very much for allowing me to participate in the care of your patient.  If you have any questions regarding my recommendations, please do not hesitate to contact me.         Toñito Camejo MD      Professor of Medicine  [employer]      Executive Medical Director, Solid Organ Transplant Program  [facility]

## 2018-12-05 NOTE — MR AVS SNAPSHOT
After Visit Summary   12/5/2018    Camacho Bhagat    MRN: [id number]           Patient Information     Date Of Birth          1965        Visit Information        Provider Department      12/5/2018 12:45 PM Toñito Camejo MD [facility] Hepatology         Follow-ups after your visit        Follow-up notes from your care team     Return in about 6 months (around 6/5/2019).      Your next 10 appointments already scheduled     Dec 06, 2018 11:30 AM CST   Telephone Call with MD PHYLLIS Mcpherson [facility] Endocrinology ([facility])    [address] [place] [address]  3rd [facility] [address] [phone]           Note: this is not an onsite visit; there is no need to come to the facility.  Thank you for choosing [facility] for your health care needs. Below is some information for patients who are interested in having their follow-up visit with a physician by telephone. In some cases, a telephone visit can be an effective and convenient way to manage your follow-up care. Choosing a telephone visit rather than a face to face visit for your follow-up care is a decision that you and your physician can make together to ensure it meets all of your needs.  A face to face visit is always an available option, if you choose to do so.  We want to make sure you have all of the information you need about the telephone visit option and answer all of your questions before you decide to schedule a telephone follow-up visit. If you have any questions, you may talk to a staff member or our financial counselor at [phone].  1. General overview Our clinic sees patients for a variety of conditions and concerns. A face to face visit with your doctor is required for any new concerns or for your initial visit. If you and your doctor decide that a follow up visit by telephone is appropriate, you may decide to opt for a telephone visit.   2. Billing and insurance  coverage There is a charge for telephone visits, similar to the charge for an in-person visit. Your bill is based on the amount of time you and your physician are on the phone. We will bill each visit to your insurance company (just like your other medical visits), and you will be responsible for any costs not paid by your insurance company. The charge may be denied by your insurance company, in which case you will be responsible for the entire amount billed. The decision to cover the cost is determined by your insurance company. If you want to know what your insurance company will cover, we encourage you to contact them to determine your coverage. The codes below are the codes we use when billing for telephone visits and the associated charges. This may help you work with your insurance company to determine your benefits.   Billing CPT codes for Telephone visits  35481 ($30), 58675 ($35), 43803 ($40)            Apr 03, 2019  1:00 PM CDT   (Arrive by 12:30 PM)   Return Visit with Cinda Cazares NP   [facility] Nephrology ([facility])    [address] [place] [address]  [address]  [facility] [address] [phone]            May 13, 2019 10:00 AM CDT   (Arrive by 9:45 AM)   RETURN ENDOCRINE with Alisa West MD   [facility] Endocrinology ([facility])    [address] [place] [address]  3rd Floor  [facility] [id number] [phone]            Jun 05, 2019  1:00 PM CDT   (Arrive by 12:45 PM)   Return Liver Transplant with Toñito Camejo MD   [facility] Hepatology ([facility])    [address] [place] [address]  [address]  [facility] [address] [phone]              Who to contact     If you have questions or need follow up information about today's clinic visit or your schedule please contact [facility] HEPATOLOGY directly at [phone].  Normal or non-critical lab and imaging results will be communicated to you by MyChart, letter or phone  within 4 business days after the clinic has received the results. If you do not hear from us within 7 days, please contact the clinic through [employer] or phone. If you have a critical or abnormal lab result, we will notify you by phone as soon as possible.  Submit refill requests through [employer]ote or call your pharmacy and they will forward the refill request to us. Please allow 3 business days for your refill to be completed.          Additional Information About Your Visit        BuzzVote Information     [employer] gives you secure access to your electronic health record. If you see a primary care provider, you can also send messages to your care team and make appointments. If you have questions, please call your primary care clinic.  If you do not have a primary care provider, please call [phone] and they will assist you.        Care EveryWhere ID     This is your Care EveryWhere ID. This could be used by other organizations to access your [place] medical records  [id number]        Your Vitals Were     Pulse Temperature Height BMI (Body Mass Index)          75 98.2  F (36.8  C) (Oral) 1.829 m (6') 29.84 kg/m2         Blood Pressure from Last 3 Encounters:   12/05/18 137/83   12/05/18 143/75   11/19/18 129/78    Weight from Last 3 Encounters:   12/05/18 99.8 kg (220 lb)   12/05/18 99.8 kg (220 lb)   11/19/18 111.1 kg (245 lb)              Today, you had the following     No orders found for display         Today's Medication Changes          These changes are accurate as of 12/5/18  2:32 PM.  If you have any questions, ask your nurse or doctor.               These medicines have changed or have updated prescriptions.        Dose/Directions    predniSONE 2.5 MG tablet   Commonly known as:  DELTASONE   This may have changed:  when to take this   Used for:  Liver replaced by transplant (H), Long-term use of immunosuppressant medication        Dose:  2.5 mg   Take 1 tablet (2.5 mg) by mouth daily   Quantity:  90  tablet   Refills:  3       vitamin D3 1000 units (25 mcg) tablet   Commonly known as:  CHOLECALCIFEROL   This may have changed:  when to take this   Used for:  Vitamin D deficiency        Dose:  1000 Units   Take 1 tablet (1,000 Units) by mouth daily   Quantity:  100 tablet   Refills:  3                Primary Care Provider Office Phone # Fax #    Shay Kirkpatrick [phone]       [address] [place] [address]  [facility] [address]        Equal Access to Services     FRANKLIN PORTILLO AH: Hadii aad ku hadasho Soomaali, waaxda luqadaha, qaybta kaalmada adeegyada, waxay idiin hayaan adeeg rachelnerisbev lalashanda . So [facility] [phone].    ATENCIÓN: Si habla español, tiene a zheng disposición servicios gratuitos de asistencia lingüística. [facility] [phone].    We comply with applicable federal civil rights laws and [place] laws. We do not discriminate on the basis of race, color, national origin, age, disability, sex, sexual orientation, or gender identity.            Thank you!     Thank you for choosing [facility] HEPATOLOGY  for your care. Our goal is always to provide you with excellent care. Hearing back from our patients is one way we can continue to improve our services. Please take a few minutes to complete the written survey that you may receive in the mail after your visit with us. Thank you!             Your Updated Medication List - Protect others around you: Learn how to safely use, store and throw away your medicines at www.disposemymeds.org.          This list is accurate as of 12/5/18  2:32 PM.  Always use your most recent med list.                   Brand Name Dispense Instructions for use Diagnosis    amLODIPine 10 MG tablet    NORVASC    90 tablet    Take 1 tablet (10 mg) by mouth daily    HTN (hypertension)       BLOOD GLUCOSE TEST STRIPS Strp     100 each    1 strip by Lancet route 4 times daily    Type 2 diabetes mellitus with diabetic polyneuropathy, with long-term current use of insulin (H)        CIALIS 5 MG tablet   Generic drug:  tadalafil      Take 5 mg by mouth as needed        cyclobenzaprine 10 MG tablet    FLEXERIL    90 tablet    Take 1 tablet (10 mg) by mouth 3 times daily as needed for muscle spasms    Immunosuppression (H)       furosemide 40 MG tablet    LASIX    60 tablet    Take 1 tablet (40 mg) by mouth 2 times daily    Hyperkalemia, Persistent proteinuria, Benign essential hypertension       gabapentin 300 MG capsule    NEURONTIN    270 capsule    Take 1 capsule (300 mg) by mouth 3 times daily    Low back pain at multiple sites       insulin glargine 100 UNIT/ML pen    LANTUS PEN    18 mL    Inject 50 Units Subcutaneous every morning    Type 2 diabetes mellitus with diabetic polyneuropathy, with long-term current use of insulin (H)       Insulin Lispro 200 UNIT/ML soln    HUMALOG KWIKPEN    72 mL    Use one unit per 3 grams carbohydrates for all meals and snacks. Total daily dose up to 80 U.    Type 2 diabetes mellitus with diabetic nephropathy, with long-term current use of insulin (H)       insulin pen needle 32G X 4 MM miscellaneous    BD ENE U/F    400 each    Use 1 pen needle 4 times daily or as directed.        losartan 50 MG tablet    COZAAR    90 tablet    Take 1 tablet (50 mg) by mouth daily    Persistent proteinuria, Hyperkalemia, Benign essential hypertension       metoprolol succinate  MG 24 hr tablet    TOPROL-XL    90 tablet    Take 1 tablet (100 mg) by mouth daily    Benign essential hypertension       Multi-vitamin tablet      Take 1 tablet by mouth every morning        mycophenolic acid 360 MG EC tablet    GENERIC EQUIVALENT    360 tablet    Take 2 tablets (720 mg) by mouth every 12 hours    History of liver transplant (H), Long-term use of immunosuppressant medication       omeprazole 20 MG DR capsule    priLOSEC    60 capsule    Take 1 capsule (20 mg) by mouth 2 times daily    Long-term use of immunosuppressant medication, History of liver transplant (H)        oxyCODONE 5 MG tablet    ROXICODONE    30 tablet    Take 1 tablet (5 mg) by mouth every 4 hours as needed for pain maximum 6 tablet(s) per day    Primary osteoarthritis involving multiple joints, Rheumatoid arthritis involving multiple sites, unspecified rheumatoid factor presence (H)       patiromer 8.4 g packet    VELTASSA    180 each    Take 16.8 g by mouth daily    Hyperkalemia       predniSONE 2.5 MG tablet    DELTASONE    90 tablet    Take 1 tablet (2.5 mg) by mouth daily    Liver replaced by transplant (H), Long-term use of immunosuppressant medication       tacrolimus 1 MG capsule    GENERIC EQUIVALENT    330 capsule    Take 6mg (6capsules) every morning, and take 5mg (5capsules) every evening, take every 12 hours.    Liver transplant recipient (H)       ursodiol 500 MG tablet    ACTIGALL    180 tablet    Take 500 mg by mouth 2 times daily    Liver replaced by transplant (H)       vitamin D3 1000 units (25 mcg) tablet    CHOLECALCIFEROL    100 tablet    Take 1 tablet (1,000 Units) by mouth daily    Vitamin D deficiency

## 2018-12-05 NOTE — MR AVS SNAPSHOT
After Visit Summary   12/5/2018    Camacho Bhagat    MRN: [id number]           Patient Information     Date Of Birth          1965        Visit Information        Provider Department      12/5/2018 1:00 PM Cinda Cazares NP [facility] Nephrology         Follow-ups after your visit        Follow-up notes from your care team     Return in about 4 months (around 4/5/2019).      Your next 10 appointments already scheduled     Dec 06, 2018 11:30 AM CST   Telephone Call with Alisa West MD   [facility] Endocrinology ([facility])    [address] [place] [address] [phone]           Note: this is not an onsite visit; there is no need to come to the facility.  Thank you for choosing [facility] for your health care needs. Below is some information for patients who are interested in having their follow-up visit with a physician by telephone. In some cases, a telephone visit can be an effective and convenient way to manage your follow-up care. Choosing a telephone visit rather than a face to face visit for your follow-up care is a decision that you and your physician can make together to ensure it meets all of your needs.  A face to face visit is always an available option, if you choose to do so.  We want to make sure you have all of the information you need about the telephone visit option and answer all of your questions before you decide to schedule a telephone follow-up visit. If you have any questions, you may talk to a staff member or our financial counselor at [phone].  1. General overview Our clinic sees patients for a variety of conditions and concerns. A face to face visit with your doctor is required for any new concerns or for your initial visit. If you and your doctor decide that a follow up visit by telephone is appropriate, you may decide to opt for a telephone visit.   2. Billing and  insurance coverage There is a charge for telephone visits, similar to the charge for an in-person visit. Your bill is based on the amount of time you and your physician are on the phone. We will bill each visit to your insurance company (just like your other medical visits), and you will be responsible for any costs not paid by your insurance company. The charge may be denied by your insurance company, in which case you will be responsible for the entire amount billed. The decision to cover the cost is determined by your insurance company. If you want to know what your insurance company will cover, we encourage you to contact them to determine your coverage. The codes below are the codes we use when billing for telephone visits and the associated charges. This may help you work with your insurance company to determine your benefits.   Billing CPT codes for Telephone visits  92431 ($30), 54580 ($35), 65717 ($40)            Apr 03, 2019  1:00 PM CDT   (Arrive by 12:30 PM)   Return Visit with Cinda Cazares NP   [facility] Nephrology ([facility])    [address] [place] [address]  [address]  [facility] [address] [phone]            May 13, 2019 10:00 AM CDT   (Arrive by 9:45 AM)   RETURN ENDOCRINE with Alisa West MD   [facility] Endocrinology ([facility])    [address] [place] [address]  3rd Floor  [facility] [id number] [phone]            Jun 05, 2019  1:00 PM CDT   (Arrive by 12:45 PM)   Return Liver Transplant with Toñito Camejo MD   [facility] Hepatology ([facility])    [address] [place] [address]  [address]  [facility] [address] [phone]              Who to contact     If you have questions or need follow up information about today's clinic visit or your schedule please contact [facility] NEPHROLOG[facility] directly at [phone].  Normal or non-critical lab and imaging results will be communicated to you by MyChart, letter or  phone within 4 business days after the clinic has received the results. If you do not hear from us within 7 days, please contact the clinic through Nov[employer] or phone. If you have a critical or abnormal lab result, we will notify you by phone as soon as possible.  Submit refill requests through [employer] or call your pharmacy and they will forward the refill request to us. Please allow 3 business days for your refill to be completed.          Additional Information About Your Visit        Novawise Information     [employer] gives you secure access to your electronic health record. If you see a primary care provider, you can also send messages to your care team and make appointments. If you have questions, please call your primary care clinic.  If you do not have a primary care provider, please call [phone] and they will assist you.        Care EveryWhere ID     This is your Care EveryWhere ID. This could be used by other organizations to access your [facility] medical records  [id number]        Your Vitals Were     Pulse Temperature Height BMI (Body Mass Index)          83 98.2  F (36.8  C) (Oral) 1.829 m (6') 29.84 kg/m2         Blood Pressure from Last 3 Encounters:   12/05/18 137/83   12/05/18 143/75   11/19/18 129/78    Weight from Last 3 Encounters:   12/05/18 99.8 kg (220 lb)   12/05/18 99.8 kg (220 lb)   11/19/18 111.1 kg (245 lb)              Today, you had the following     No orders found for display         Today's Medication Changes          These changes are accurate as of 12/5/18  2:36 PM.  If you have any questions, ask your nurse or doctor.               These medicines have changed or have updated prescriptions.        Dose/Directions    predniSONE 2.5 MG tablet   Commonly known as:  DELTASONE   This may have changed:  when to take this   Used for:  Liver replaced by transplant (H), Long-term use of immunosuppressant medication        Dose:  2.5 mg   Take 1 tablet (2.5 mg) by mouth daily   Quantity:   90 tablet   Refills:  3       vitamin D3 1000 units (25 mcg) tablet   Commonly known as:  CHOLECALCIFEROL   This may have changed:  when to take this   Used for:  Vitamin D deficiency        Dose:  1000 Units   Take 1 tablet (1,000 Units) by mouth daily   Quantity:  100 tablet   Refills:  3                Primary Care Provider Office Phone # Fax #    Shay Kirkpatrick [phone]       [address] [place] [phone]  [facility] [address]        Equal Access to Services     FRANKLIN PORTILLO AH: Hadii aad ku hadasho Soomaali, waaxda luqadaha, qaybta kaalmada adeegyada, waxay idiin hayaan adeelena ramosnerisbev lalashanda . So [facility] [phone].    ATENCIÓN: Si habla español, tiene a zheng disposición servicios gratuitos de asistencia lingüística. [facility] [phone].    We comply with applicable federal civil rights laws and [place] laws. We do not discriminate on the basis of race, color, national origin, age, disability, sex, sexual orientation, or gender identity.            Thank you!     Thank you for choosing [facility]  for your care. Our goal is always to provide you with excellent care. Hearing back from our patients is one way we can continue to improve our services. Please take a few minutes to complete the written survey that you may receive in the mail after your visit with us. Thank you!             Your Updated Medication List - Protect others around you: Learn how to safely use, store and throw away your medicines at www.disposemymeds.org.          This list is accurate as of 12/5/18  2:36 PM.  Always use your most recent med list.                   Brand Name Dispense Instructions for use Diagnosis    amLODIPine 10 MG tablet    NORVASC    90 tablet    Take 1 tablet (10 mg) by mouth daily    HTN (hypertension)       BLOOD GLUCOSE TEST STRIPS Strp     100 each    1 strip by Lancet route 4 times daily    Type 2 diabetes mellitus with diabetic polyneuropathy, with long-term current use of insulin (H)        CIALIS 5 MG tablet   Generic drug:  tadalafil      Take 5 mg by mouth as needed        cyclobenzaprine 10 MG tablet    FLEXERIL    90 tablet    Take 1 tablet (10 mg) by mouth 3 times daily as needed for muscle spasms    Immunosuppression (H)       furosemide 40 MG tablet    LASIX    60 tablet    Take 1 tablet (40 mg) by mouth 2 times daily    Hyperkalemia, Persistent proteinuria, Benign essential hypertension       gabapentin 300 MG capsule    NEURONTIN    270 capsule    Take 1 capsule (300 mg) by mouth 3 times daily    Low back pain at multiple sites       insulin glargine 100 UNIT/ML pen    LANTUS PEN    18 mL    Inject 50 Units Subcutaneous every morning    Type 2 diabetes mellitus with diabetic polyneuropathy, with long-term current use of insulin (H)       Insulin Lispro 200 UNIT/ML soln    HUMALOG KWIKPEN    72 mL    Use one unit per 3 grams carbohydrates for all meals and snacks. Total daily dose up to 80 U.    Type 2 diabetes mellitus with diabetic nephropathy, with long-term current use of insulin (H)       insulin pen needle 32G X 4 MM miscellaneous    BD ENE U/F    400 each    Use 1 pen needle 4 times daily or as directed.        losartan 50 MG tablet    COZAAR    90 tablet    Take 1 tablet (50 mg) by mouth daily    Persistent proteinuria, Hyperkalemia, Benign essential hypertension       metoprolol succinate  MG 24 hr tablet    TOPROL-XL    90 tablet    Take 1 tablet (100 mg) by mouth daily    Benign essential hypertension       Multi-vitamin tablet      Take 1 tablet by mouth every morning        mycophenolic acid 360 MG EC tablet    GENERIC EQUIVALENT    360 tablet    Take 2 tablets (720 mg) by mouth every 12 hours    History of liver transplant (H), Long-term use of immunosuppressant medication       omeprazole 20 MG DR capsule    priLOSEC    60 capsule    Take 1 capsule (20 mg) by mouth 2 times daily    Long-term use of immunosuppressant medication, History of liver transplant (H)        oxyCODONE 5 MG tablet    ROXICODONE    30 tablet    Take 1 tablet (5 mg) by mouth every 4 hours as needed for pain maximum 6 tablet(s) per day    Primary osteoarthritis involving multiple joints, Rheumatoid arthritis involving multiple sites, unspecified rheumatoid factor presence (H)       patiromer 8.4 g packet    VELTASSA    180 each    Take 16.8 g by mouth daily    Hyperkalemia       predniSONE 2.5 MG tablet    DELTASONE    90 tablet    Take 1 tablet (2.5 mg) by mouth daily    Liver replaced by transplant (H), Long-term use of immunosuppressant medication       tacrolimus 1 MG capsule    GENERIC EQUIVALENT    330 capsule    Take 6mg (6capsules) every morning, and take 5mg (5capsules) every evening, take every 12 hours.    Liver transplant recipient (H)       ursodiol 500 MG tablet    ACTIGALL    180 tablet    Take 500 mg by mouth 2 times daily    Liver replaced by transplant (H)       vitamin D3 1000 units (25 mcg) tablet    CHOLECALCIFEROL    100 tablet    Take 1 tablet (1,000 Units) by mouth daily    Vitamin D deficiency

## 2018-12-06 ENCOUNTER — VIRTUAL VISIT (OUTPATIENT)
Dept: ENDOCRINOLOGY | Facility: CLINIC | Age: 53
End: 2018-12-06
Payer: COMMERCIAL

## 2018-12-06 DIAGNOSIS — Z79.4 TYPE 2 DIABETES MELLITUS WITH DIABETIC NEPHROPATHY, WITH LONG-TERM CURRENT USE OF INSULIN (H): Primary | ICD-10-CM

## 2018-12-06 DIAGNOSIS — E11.21 TYPE 2 DIABETES MELLITUS WITH DIABETIC NEPHROPATHY, WITH LONG-TERM CURRENT USE OF INSULIN (H): Primary | ICD-10-CM

## 2018-12-06 NOTE — PROGRESS NOTES
The phone visit was scheduled to review the blood glucose numbers and adjust insulin dose.    The glucometer readings reveal an average blood glucose around 150, with a standard deviation of 65.  Morning blood sugar is quite variable, from 122 lower 200s.  However, the days when he goes to bed with a good blood sugar, his morning blood sugar is well controlled.  He reports being compliant in taking 1 unit NovoLog per 3 g carbohydrates, 50 units of Lantus in the morning and a correction of 1 unit per 25 mg above 50.  For the main 2 meals of the day, breakfast and dinner, he reports taking 25 and 30 units, on average.  Most of the hypoglycemic episodes occur prior to dinner and, according to the patient, some of them are related to increased physical activity.      Recommendations:  Increase the dose of Lantus to 52 units  Always administer short-acting insulin 15-20 minutes prior to eating  For meals followed by physical activity/, decrease the dose of short-acting insulin by 30%    Total call time was 9 minutes.

## 2018-12-06 NOTE — MR AVS SNAPSHOT
After Visit Summary   12/6/2018    Camacho Bhagat    MRN: [id number]           Patient Information     Date Of Birth          1965        Visit Information        Provider Department      12/6/2018 11:30 AM Alisa West MD [facility] Endocrinology        Today's Diagnoses     Type 2 diabetes mellitus with diabetic nephropathy, with long-term current use of insulin (H)    -  1       Follow-ups after your visit        Your next 10 appointments already scheduled     Apr 03, 2019  1:00 PM CDT   (Arrive by 12:30 PM)   Return Visit with Cinda Cazares NP   [facility] ([facility])    [address]  [address]  [facility] [id number] [phone]            May 13, 2019 10:00 AM CDT   (Arrive by 9:45 AM)   RETURN ENDOCRINE with Alisa West MD   [facility] Endocrinology ([facility])    [address] [place] [address]  3rd Floor  [facility] [address] [phone]            Jun 05, 2019  1:00 PM CDT   (Arrive by 12:45 PM)   Return Liver Transplant with Toñito Camejo MD   [facility] Hepatology ([facility])    [address] [place] [address]  [address]  [facility] [address] [phone]              Who to contact     Please call your clinic at [phone] to:    Ask questions about your health    Make or cancel appointments    Discuss your medicines    Learn about your test results    Speak to your doctor            Additional Information About Your Visit        Nu-Pulse[employer] Information     WeLinkt gives you secure access to your electronic health record. If you see a primary care provider, you can also send messages to your care team and make appointments. If you have questions, please call your primary care clinic.  If you do not have a primary care provider, please call [phone] and they will assist you.      [employer] is an electronic gateway that provides easy, online access to your medical  records. With [employer], you can request a clinic appointment, read your test results, renew a prescription or communicate with your care team.     To access your existing account, please contact your [facility] or call [phone] for assistance.        Care EveryWhere ID     This is your Care EveryWhere ID. This could be used by other organizations to access your [facility] medical records  [id number]         Blood Pressure from Last 3 Encounters:   12/05/18 137/83   12/05/18 143/75   11/19/18 129/78    Weight from Last 3 Encounters:   12/05/18 99.8 kg (220 lb)   12/05/18 99.8 kg (220 lb)   11/19/18 111.1 kg (245 lb)              Today, you had the following     No orders found for display         Today's Medication Changes          These changes are accurate as of 12/6/18 11:59 PM.  If you have any questions, ask your nurse or doctor.               These medicines have changed or have updated prescriptions.        Dose/Directions    predniSONE 2.5 MG tablet   Commonly known as:  DELTASONE   This may have changed:  when to take this   Used for:  Liver replaced by transplant (H), Long-term use of immunosuppressant medication        Dose:  2.5 mg   Take 1 tablet (2.5 mg) by mouth daily   Quantity:  90 tablet   Refills:  3       vitamin D3 1000 units (25 mcg) tablet   Commonly known as:  CHOLECALCIFEROL   This may have changed:  when to take this   Used for:  Vitamin D deficiency        Dose:  1000 Units   Take 1 tablet (1,000 Units) by mouth daily   Quantity:  100 tablet   Refills:  3                Primary Care Provider Office Phone # Fax #    Shay Kirkpatrick [phone]       [address] [place] [address]  [facility] [address]        Equal Access to Services     FRANKLIN PORTILLO : troy [place] qaybta kaalmada adeegyada, waxay idiin hayaan adeeg kharash la'aan ah. So [facility] [phone].    ATENCIÓN: Si habla español, tiene a zheng disposición  servicios gratuitos de asistencia lingüística. Shahla montemayor [phone].    We comply with applicable federal civil rights laws and [place] laws. We do not discriminate on the basis of race, color, national origin, age, disability, sex, sexual orientation, or gender identity.            Thank you!     Thank you for choosing [facility]  for your care. Our goal is always to provide you with excellent care. Hearing back from our patients is one way we can continue to improve our services. Please take a few minutes to complete the written survey that you may receive in the mail after your visit with us. Thank you!             Your Updated Medication List - Protect others around you: Learn how to safely use, store and throw away your medicines at www.disposemymeds.org.          This list is accurate as of 12/6/18 11:59 PM.  Always use your most recent med list.                   Brand Name Dispense Instructions for use Diagnosis    amLODIPine 10 MG tablet    NORVASC    90 tablet    Take 1 tablet (10 mg) by mouth daily    HTN (hypertension)       BLOOD GLUCOSE TEST STRIPS Strp     100 each    1 strip by Lancet route 4 times daily    Type 2 diabetes mellitus with diabetic polyneuropathy, with long-term current use of insulin (H)       CIALIS 5 MG tablet   Generic drug:  tadalafil      Take 5 mg by mouth as needed        cyclobenzaprine 10 MG tablet    FLEXERIL    90 tablet    Take 1 tablet (10 mg) by mouth 3 times daily as needed for muscle spasms    Immunosuppression (H)       furosemide 40 MG tablet    LASIX    60 tablet    Take 1 tablet (40 mg) by mouth 2 times daily    Hyperkalemia, Persistent proteinuria, Benign essential hypertension       gabapentin 300 MG capsule    NEURONTIN    270 capsule    Take 1 capsule (300 mg) by mouth 3 times daily    Low back pain at multiple sites       insulin glargine 100 UNIT/ML pen    LANTUS PEN    18 mL    Inject 50 Units Subcutaneous every morning    Type 2 diabetes  mellitus with diabetic polyneuropathy, with long-term current use of insulin (H)       Insulin Lispro 200 UNIT/ML soln    HUMALOG KWIKPEN    72 mL    Use one unit per 3 grams carbohydrates for all meals and snacks. Total daily dose up to 80 U.    Type 2 diabetes mellitus with diabetic nephropathy, with long-term current use of insulin (H)       insulin pen needle 32G X 4 MM miscellaneous    BD ENE U/F    400 each    Use 1 pen needle 4 times daily or as directed.        losartan 50 MG tablet    COZAAR    90 tablet    Take 1 tablet (50 mg) by mouth daily    Persistent proteinuria, Hyperkalemia, Benign essential hypertension       metoprolol succinate  MG 24 hr tablet    TOPROL-XL    90 tablet    Take 1 tablet (100 mg) by mouth daily    Benign essential hypertension       Multi-vitamin tablet      Take 1 tablet by mouth every morning        mycophenolic acid 360 MG EC tablet    GENERIC EQUIVALENT    360 tablet    Take 2 tablets (720 mg) by mouth every 12 hours    History of liver transplant (H), Long-term use of immunosuppressant medication       omeprazole 20 MG DR capsule    priLOSEC    60 capsule    Take 1 capsule (20 mg) by mouth 2 times daily    Long-term use of immunosuppressant medication, History of liver transplant (H)       oxyCODONE 5 MG tablet    ROXICODONE    30 tablet    Take 1 tablet (5 mg) by mouth every 4 hours as needed for pain maximum 6 tablet(s) per day    Primary osteoarthritis involving multiple joints, Rheumatoid arthritis involving multiple sites, unspecified rheumatoid factor presence (H)       patiromer 8.4 g packet    VELTASSA    180 each    Take 16.8 g by mouth daily    Hyperkalemia       predniSONE 2.5 MG tablet    DELTASONE    90 tablet    Take 1 tablet (2.5 mg) by mouth daily    Liver replaced by transplant (H), Long-term use of immunosuppressant medication       tacrolimus 1 MG capsule    GENERIC EQUIVALENT    330 capsule    Take 6mg (6capsules) every morning, and take 5mg  (5capsules) every evening, take every 12 hours.    Liver transplant recipient (H)       ursodiol 500 MG tablet    ACTIGALL    180 tablet    Take 500 mg by mouth 2 times daily    Liver replaced by transplant (H)       vitamin D3 1000 units (25 mcg) tablet    CHOLECALCIFEROL    100 tablet    Take 1 tablet (1,000 Units) by mouth daily    Vitamin D deficiency

## 2018-12-06 NOTE — PROGRESS NOTES
Nephrology Clinic Visit 12/5/18    Assessment and Plan:       1. CKD3 w/mild proteinuria - Stable. Creat 1.4, eGFR 52 ml/mn. UPCR 0.3 g/gCr on 11/18.   Baseline in the mid 1 range  Etiology of CKD likely multifactorial; DM, recurrent EJ, CNI.    - Had been intolerant of ACE/ARB previously given problems with hyperkalemia assoc with Tacrolimus, but retrial when TAC level was <0.3 did not result in Hyperkalemia. In fact Valtessa had been discontinued because he was becoming hypokalemic.    - Patient was restarted on ARB in 4/18 for unexplained nephrotic range proteinuria following ostomy takedown with improvement in UPCR, but then developed mild acute rejection and Tac dose was increased resulting in hyperkalemia. He was restarted on Valtassa with improvement in hyperkalemia   - Blood pressures now at goal.    - Does not use NSAIDs.    - Lab frequency per Transplant   - A1c goal is 7%. HgA1c 6.1% Nov 2018      2. HTN - Controlled. Home blood pressures 120-140/. Clinic blood pressure today 137/83. HR 83. No edema. No dyspnea or CP.   Current antihypertensive regimen:      Lasix 40 mg bid     Amlodipine 10 mg qd      Toprol  mg every day       Losartan 50 mg qd    - Continue labs per transplant   - Continue to monitor blood pressures       3. Hyperkalemia assoc with Tac/ARB - Potassium running ~ 5.0 on Losartan 50 mg every day and Valtassa 16.8 g/day.    - Continue current regimen      4. Volume status - Euvolemic. No edema/dyspnea. Does have increased stools when TAC dose is increased. Weight 220#, down from 225 # last visit. Blood pressures  at goal.    - Continue Lasix 40 mg bid.       5. Acid base - No acute concerns. Metabolic acidosis resolved following ileostomy takedown. Bicarb 23.   Off supplement.       6. BMD - Ca 8.4, Phos 2.7, albumin 3.5   - Vitamin D 38 (10/18)   - PTH 22 (10/18).    - Continue Vit D 1000 U qd      7.Anemia -Hgb 10.9.    - Iron studies 7/18: Ferritin 1338, Fe 166, IS 73%   - Had  colonoscopy 2017 (poor study), Ileoscopy 8/17 - nml   - Not on iron and has not needed DIPAK      8. Liver transplant IS: Tacrolimus, MMF, Pred. Tac level 6.8   - Tacro dose increased given mild rejection in June 9. Dispo- RCT 4 months for f/u      Reason for Visit:  CKD3/HTN follow up        HPI:  Mr Bhagat is a 52 yo male with CKD3, HTN, Chronic hyperkalemia, Liver transplant, in today for CKD/HTN followup. Last seen in clinic by me on 9/14/18. Toprol was increased to 100 mg daily. Home blood pressures now in the 120-140/ range. Baseline creat mid 1's.       Interval Hx:     No hospital admissions.       ROS:  Hurt his back over Thanksgiving. No radiculopathy. Getting a new TENS unit. Otherwise feeling well.   Denies dyspnea, CP, abdominal pain. Voiding w/o difficulty. Exercising regularly. Appetite is good.         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 Former smoker,   by profession. 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 Exercising regularly.    Family Hx:   Family History   Problem Relation Age of Onset     Diabetes Father      Hypertension Father      Substance Abuse Father      Arthritis Mother      Thyroid Cancer Mother      Survivor!     Cervical Cancer Maternal Grandmother      Cerebrovascular Disease Maternal Grandfather      No Known Problems Paternal Grandmother      Prostate Cancer Paternal Grandfather      Colon Cancer No family hx of      Hyperlipidemia No family hx of      Coronary Artery Disease No family hx of      Breast Cancer No family hx of        Allergies:  Allergies   Allergen Reactions     Erythromycin GI  Disturbance     Vioxx      Nausea, vomiting       Medications:  Current Outpatient Prescriptions   Medication Sig     amLODIPine (NORVASC) 10 MG tablet Take 1 tablet (10 mg) by mouth daily     cholecalciferol (VITAMIN D3) 1000 UNIT tablet Take 1 tablet (1,000 Units) by mouth daily (Patient taking differently: Take 1,000 Units by mouth every morning )     CIALIS 5 MG tablet Take 5 mg by mouth as needed      cyclobenzaprine (FLEXERIL) 10 MG tablet Take 1 tablet (10 mg) by mouth 3 times daily as needed for muscle spasms     furosemide (LASIX) 40 MG tablet Take 1 tablet (40 mg) by mouth 2 times daily     gabapentin (NEURONTIN) 300 MG capsule Take 1 capsule (300 mg) by mouth 3 times daily     Glucose Blood (BLOOD GLUCOSE TEST STRIPS) STRP 1 strip by Lancet route 4 times daily     insulin glargine (LANTUS) 100 UNIT/ML injection Inject 50 Units Subcutaneous every morning     Insulin Lispro (HUMALOG KWIKPEN) 200 UNIT/ML soln Use one unit per 3 grams carbohydrates for all meals and snacks. Total daily dose up to 80 U.     insulin pen needle (BD ENE U/F) 32G X 4 MM miscellaneous Use 1 pen needle 4 times daily or as directed.     losartan (COZAAR) 50 MG tablet Take 1 tablet (50 mg) by mouth daily     metoprolol succinate (TOPROL-XL) 100 MG 24 hr tablet Take 1 tablet (100 mg) by mouth daily     multivitamin, therapeutic with minerals (MULTI-VITAMIN) TABS tablet Take 1 tablet by mouth every morning     mycophenolic acid (GENERIC EQUIVALENT) 360 MG EC tablet Take 2 tablets (720 mg) by mouth every 12 hours     omeprazole (PRILOSEC) 20 MG CR capsule Take 1 capsule (20 mg) by mouth 2 times daily     oxyCODONE (ROXICODONE) 5 MG tablet Take 1 tablet (5 mg) by mouth every 4 hours as needed for pain maximum 6 tablet(s) per day     patiromer (VELTASSA) 8.4 g packet Take 16.8 g by mouth daily     predniSONE (DELTASONE) 2.5 MG tablet Take 1 tablet (2.5 mg) by mouth daily (Patient taking differently: Take 2.5 mg by mouth every morning )      tacrolimus (GENERIC EQUIVALENT) 1 MG capsule Take 6mg (6capsules) every morning, and take 5mg (5capsules) every evening, take every 12 hours.     ursodiol 500 MG TABS Take 500 mg by mouth 2 times daily     No current facility-administered medications for this visit.       Vitals:  /83  Pulse 83  Temp 98.2  F (36.8  C) (Oral)  Ht 1.829 m (6')  Wt 99.8 kg (220 lb)  BMI 29.84 kg/m2    Exam:  Gen: Well groomed, pleasant  CARDIAC: RRR  LUNGS: CTA  ABDOMEN: Abdomin NT. Non distended.  EXT: No edema  NEURO: Alert, oriented.    LABS:   CMP  Recent Labs   Lab Test  11/27/18   1128  11/06/18   1201  10/24/18   1129  10/10/18   1059   NA  139  139  141  136   POTASSIUM  5.0  4.8  5.9*  5.0   CHLORIDE  110*  110*  111*  106   CO2  23  23  23  26   ANIONGAP  6  6  7  4   GLC  118*  133*  130*  290*   BUN  44*  42*  42*  35*   CR  1.43*  1.51*  1.48*  1.38*   GFRESTIMATED  52*  48*  50*  54*   GFRESTBLACK  62  59*  60*  65   JACOB  8.4*  8.4*  8.7  8.2*     Recent Labs   Lab Test  11/27/18   1128  11/06/18   1201  10/24/18   1129  10/10/18   1059   BILITOTAL  0.7  0.7  0.9  0.9   ALKPHOS  277*  251*  284*  255*   ALT  57  51  62  53   AST  37  38  47*  41     CBC  Recent Labs   Lab Test  11/27/18   1128  11/06/18   1201  10/24/18   1129  10/10/18   1059   HGB  10.9*  10.7*  10.9*  11.7*   WBC  3.8*  3.9*  3.5*  3.8*   RBC  3.52*  3.36*  3.40*  3.66*   HCT  33.9*  31.8*  32.4*  34.5*   MCV  96  95  95  94   MCH  31.0  31.8  32.1  32.0   MCHC  32.2  33.6  33.6  33.9   RDW  14.1  13.8  13.9  14.3   PLT  80*  77*  75*  81*     URINE STUDIES  Recent Labs   Lab Test  04/11/18   1446  02/26/18   1115  01/29/18   1235  10/27/17   0942   COLOR  Yellow  Yellow  Yellow  Yellow   APPEARANCE  Slightly Cloudy  Clear  Clear  Slightly Cloudy   URINEGLC  >499*  >499*  150*  Negative   URINEBILI  Negative  Negative  Negative  Negative   URINEKETONE  Negative  Negative  Negative  Negative   SG  1.010  1.010  1.013  1.008   UBLD  Small*   Negative  Small*  Negative   URINEPH  5.0  5.0  6.0  5.0   PROTEIN  100*  100*  >499*  10*   NITRITE  Negative  Negative  Negative  Negative   LEUKEST  Negative  Negative  Negative  Negative   RBCU  3*  2  4*  2   WBCU  <1  1  4*  6*     Recent Labs   Lab Test  11/27/18   1110  09/18/18   1200  08/17/18   1050  07/03/18   1040   UTPG  0.31*  0.28*  0.51*  0.84*     PTH  Recent Labs   Lab Test  10/10/18   1059  09/18/18   1212  09/14/17   1133   PTHI  22  28  34     IRON STUDIES  Recent Labs   Lab Test  07/10/18   1022  03/06/18   1029  02/05/18   1108   IRON  166  143  44   FEB  227*  228*  166*   IRONSAT  73*  63*  26   NELY  1338*  1362*  1014*       Cinda Cazares, NP

## 2018-12-12 NOTE — NURSING NOTE
Here for post liver transplant follow-up.       Complaints:  Feeling well    Current immunosuppression:    Tacrolimus dose increased to 3mg Q 12 hours.  Continue myfortic 720mg Q 12hours.    Med changes: dose of tacrolimus not changed before labs done 6/05..      Lab frequency:  Plan recheck labs this week.    Follow-up:  Hepatology  RTC in 3 months   IV discontinued, cath removed intact

## 2018-12-26 DIAGNOSIS — Z79.4 TYPE 2 DIABETES MELLITUS WITH DIABETIC POLYNEUROPATHY, WITH LONG-TERM CURRENT USE OF INSULIN (H): ICD-10-CM

## 2018-12-26 DIAGNOSIS — E11.42 TYPE 2 DIABETES MELLITUS WITH DIABETIC POLYNEUROPATHY, WITH LONG-TERM CURRENT USE OF INSULIN (H): ICD-10-CM

## 2018-12-28 ENCOUNTER — HOSPITAL ENCOUNTER (OUTPATIENT)
Dept: LAB | Facility: CLINIC | Age: 53
Discharge: HOME | End: 2018-12-28
Attending: INTERNAL MEDICINE | Admitting: INTERNAL MEDICINE
Payer: COMMERCIAL

## 2018-12-28 ENCOUNTER — CARE COORDINATION (OUTPATIENT)
Dept: NEPHROLOGY | Facility: CLINIC | Age: 53
End: 2018-12-28

## 2018-12-28 ENCOUNTER — TELEPHONE (OUTPATIENT)
Dept: ENDOCRINOLOGY | Facility: CLINIC | Age: 53
End: 2018-12-28

## 2018-12-28 DIAGNOSIS — Z94.4 LIVER REPLACED BY TRANSPLANT (H): ICD-10-CM

## 2018-12-28 DIAGNOSIS — E87.5 HYPERKALEMIA: ICD-10-CM

## 2018-12-28 LAB
ALBUMIN SERPL-MCNC: 3.8 G/DL (ref 3.4–5)
ALP SERPL-CCNC: 268 U/L (ref 40–150)
ALT SERPL W P-5'-P-CCNC: 55 U/L (ref 0–70)
ANION GAP SERPL CALCULATED.3IONS-SCNC: 5 MMOL/L (ref 3–14)
AST SERPL W P-5'-P-CCNC: 34 U/L (ref 0–45)
BILIRUB DIRECT SERPL-MCNC: 0.4 MG/DL (ref 0–0.2)
BILIRUB SERPL-MCNC: 0.9 MG/DL (ref 0.2–1.3)
BUN SERPL-MCNC: 46 MG/DL (ref 7–30)
CALCIUM SERPL-MCNC: 8.7 MG/DL (ref 8.5–10.1)
CHLORIDE SERPL-SCNC: 111 MMOL/L (ref 94–109)
CO2 SERPL-SCNC: 22 MMOL/L (ref 20–32)
CREAT SERPL-MCNC: 1.41 MG/DL (ref 0.66–1.25)
ERYTHROCYTE [DISTWIDTH] IN BLOOD BY AUTOMATED COUNT: 14.1 % (ref 10–15)
GFR SERPL CREATININE-BSD FRML MDRD: 56 ML/MIN/{1.73_M2}
GLUCOSE SERPL-MCNC: 153 MG/DL (ref 70–99)
HCT VFR BLD AUTO: 33.3 % (ref 40–53)
HGB BLD-MCNC: 11.3 G/DL (ref 13.3–17.7)
MCH RBC QN AUTO: 31.9 PG (ref 26.5–33)
MCHC RBC AUTO-ENTMCNC: 33.9 G/DL (ref 31.5–36.5)
MCV RBC AUTO: 94 FL (ref 78–100)
PHOSPHATE SERPL-MCNC: 3 MG/DL (ref 2.5–4.5)
PLATELET # BLD AUTO: 76 10E9/L (ref 150–450)
POTASSIUM SERPL-SCNC: 5.7 MMOL/L (ref 3.4–5.3)
PROT SERPL-MCNC: 6.9 G/DL (ref 6.8–8.8)
RBC # BLD AUTO: 3.54 10E12/L (ref 4.4–5.9)
SODIUM SERPL-SCNC: 138 MMOL/L (ref 133–144)
TACROLIMUS BLD-MCNC: 5.6 UG/L (ref 5–15)
TME LAST DOSE: NORMAL H
WBC # BLD AUTO: 3.8 10E9/L (ref 4–11)

## 2018-12-28 PROCEDURE — 36415 COLL VENOUS BLD VENIPUNCTURE: CPT | Performed by: INTERNAL MEDICINE

## 2018-12-28 PROCEDURE — 82248 BILIRUBIN DIRECT: CPT

## 2018-12-28 PROCEDURE — 84460 ALANINE AMINO (ALT) (SGPT): CPT

## 2018-12-28 PROCEDURE — 84155 ASSAY OF PROTEIN SERUM: CPT

## 2018-12-28 PROCEDURE — 82247 BILIRUBIN TOTAL: CPT

## 2018-12-28 PROCEDURE — 80197 ASSAY OF TACROLIMUS: CPT | Performed by: INTERNAL MEDICINE

## 2018-12-28 PROCEDURE — 84450 TRANSFERASE (AST) (SGOT): CPT

## 2018-12-28 PROCEDURE — 80069 RENAL FUNCTION PANEL: CPT

## 2018-12-28 PROCEDURE — 36415 COLL VENOUS BLD VENIPUNCTURE: CPT

## 2018-12-28 PROCEDURE — 84075 ASSAY ALKALINE PHOSPHATASE: CPT

## 2018-12-28 PROCEDURE — 85027 COMPLETE CBC AUTOMATED: CPT

## 2018-12-28 PROCEDURE — 80048 BASIC METABOLIC PNL TOTAL CA: CPT

## 2018-12-28 NOTE — TELEPHONE ENCOUNTER
"No P/A is needed. I called pharmacy to find out what the problem was in order to assess what type of P/A I needed to do. I spoke with someone at the pharmacy and after being put on hold for a minute, was told it does not need a P/A. I again, verified \"you don't see any issues with processing the prescription?\", and was told no. The issue she said was \"the only problem is an inventory problem, nothing to do with billing\".  "

## 2018-12-28 NOTE — PROGRESS NOTES
Called patient regarding potassium level of 5.7. Patient confirmed he's still taking Veltassa 16.8 g daily. Has history of fluctuating potassium levels. Tacrolimus level still pending.     Will update Jovanna and see if she wants to recheck labs sooner or has any other recommendations.     Quang Conteh, RN, BAN  Nephrology RN Care Coordinator

## 2019-01-02 NOTE — PROGRESS NOTES
Reviewed with Jovanna. No changes recommended at this time. Will continue to monitor with next routine lab draw.    Quang Conteh RN

## 2019-01-09 ENCOUNTER — HOSPITAL ENCOUNTER (OUTPATIENT)
Dept: LAB | Facility: CLINIC | Age: 54
Discharge: HOME | End: 2019-01-09
Attending: INTERNAL MEDICINE
Payer: COMMERCIAL

## 2019-01-09 ENCOUNTER — TELEPHONE (OUTPATIENT)
Dept: TRANSPLANT | Facility: CLINIC | Age: 54
End: 2019-01-09
Payer: COMMERCIAL

## 2019-01-09 ENCOUNTER — CARE COORDINATION (OUTPATIENT)
Dept: NEPHROLOGY | Facility: CLINIC | Age: 54
End: 2019-01-09

## 2019-01-09 DIAGNOSIS — Z94.4 LIVER REPLACED BY TRANSPLANT (H): ICD-10-CM

## 2019-01-09 DIAGNOSIS — E87.5 HYPERKALEMIA: ICD-10-CM

## 2019-01-09 DIAGNOSIS — I10 BENIGN ESSENTIAL HYPERTENSION: ICD-10-CM

## 2019-01-09 DIAGNOSIS — R80.1 PERSISTENT PROTEINURIA: ICD-10-CM

## 2019-01-09 LAB
ALBUMIN SERPL-MCNC: 3.8 G/DL (ref 3.4–5)
ALP SERPL-CCNC: 293 U/L (ref 40–150)
ALT SERPL W P-5'-P-CCNC: 49 U/L (ref 0–70)
ANION GAP SERPL CALCULATED.3IONS-SCNC: 6 MMOL/L (ref 3–14)
AST SERPL W P-5'-P-CCNC: 33 U/L (ref 0–45)
BILIRUB DIRECT SERPL-MCNC: 0.4 MG/DL (ref 0–0.2)
BILIRUB SERPL-MCNC: 0.7 MG/DL (ref 0.2–1.3)
BUN SERPL-MCNC: 44 MG/DL (ref 7–30)
CALCIUM SERPL-MCNC: 8.9 MG/DL (ref 8.5–10.1)
CHLORIDE SERPL-SCNC: 112 MMOL/L (ref 94–109)
CO2 SERPL-SCNC: 21 MMOL/L (ref 20–32)
CREAT SERPL-MCNC: 1.41 MG/DL (ref 0.66–1.25)
ERYTHROCYTE [DISTWIDTH] IN BLOOD BY AUTOMATED COUNT: 13.8 % (ref 10–15)
GFR SERPL CREATININE-BSD FRML MDRD: 56 ML/MIN/{1.73_M2}
GLUCOSE SERPL-MCNC: 90 MG/DL (ref 70–99)
HCT VFR BLD AUTO: 35.2 % (ref 40–53)
HGB BLD-MCNC: 11.7 G/DL (ref 13.3–17.7)
MCH RBC QN AUTO: 31.2 PG (ref 26.5–33)
MCHC RBC AUTO-ENTMCNC: 33.2 G/DL (ref 31.5–36.5)
MCV RBC AUTO: 94 FL (ref 78–100)
PHOSPHATE SERPL-MCNC: 2.8 MG/DL (ref 2.5–4.5)
PLATELET # BLD AUTO: 81 10E9/L (ref 150–450)
POTASSIUM SERPL-SCNC: 6.1 MMOL/L (ref 3.4–5.3)
PROT SERPL-MCNC: 7.1 G/DL (ref 6.8–8.8)
RBC # BLD AUTO: 3.75 10E12/L (ref 4.4–5.9)
SODIUM SERPL-SCNC: 139 MMOL/L (ref 133–144)
TACROLIMUS BLD-MCNC: 7.3 UG/L (ref 5–15)
TME LAST DOSE: NORMAL H
WBC # BLD AUTO: 4.4 10E9/L (ref 4–11)

## 2019-01-09 PROCEDURE — 84075 ASSAY ALKALINE PHOSPHATASE: CPT

## 2019-01-09 PROCEDURE — 82247 BILIRUBIN TOTAL: CPT

## 2019-01-09 PROCEDURE — 84460 ALANINE AMINO (ALT) (SGPT): CPT

## 2019-01-09 PROCEDURE — 85027 COMPLETE CBC AUTOMATED: CPT

## 2019-01-09 PROCEDURE — 82248 BILIRUBIN DIRECT: CPT

## 2019-01-09 PROCEDURE — 80069 RENAL FUNCTION PANEL: CPT

## 2019-01-09 PROCEDURE — 36415 COLL VENOUS BLD VENIPUNCTURE: CPT | Performed by: INTERNAL MEDICINE

## 2019-01-09 PROCEDURE — 84450 TRANSFERASE (AST) (SGOT): CPT

## 2019-01-09 PROCEDURE — 80197 ASSAY OF TACROLIMUS: CPT | Performed by: INTERNAL MEDICINE

## 2019-01-09 PROCEDURE — 84155 ASSAY OF PROTEIN SERUM: CPT

## 2019-01-09 RX ORDER — LOSARTAN POTASSIUM 50 MG/1
25 TABLET ORAL DAILY
Qty: 90 TABLET | Refills: 3
Start: 2019-01-09 | End: 2019-04-03

## 2019-01-09 NOTE — PROGRESS NOTES
Nephrology Note: Nursing Outreach Encounter    REASON FOR CALL:                                                      REASON FOR CALL: Hyperkalemia                                          SITUATION/BACKROUND:                                                    Patient is being treated for CKD Stage 3/ hyperkalemia.    Patient had labs done today and potassium is 6.1. Was 5.7 on 12/28. Previously had been pretty stable in the low 5s. He is taking Veltassa for hyperkalemia management.     Discussed result with Jovanna Foster NP. Will have patient cut losartan dose from 50 mg daily to 25 mg daily and recheck labs next week.       ASSESSMENT:                                                      Reviewed result with patient. He reports no symptoms or concerns. Is still taking Veltassa as prescribed. No recent changes in medications or diet. Tacrolimus level was 5.6 on 12/28.       PLAN:                                                      Follow Up:   - Decrease losartan to 25 mg dialy  - Recheck labs in one week      Patient verbalized understanding and will contact the clinic with any further questions or concerns.     Quang Conteh RN  Nephrology RN Care Coordinator

## 2019-01-09 NOTE — TELEPHONE ENCOUNTER
Patient had potassium level drawn today and its 6.1  Questions regarding this mess rayray can be called to [phone].  Thanks   SRINIVASA Gallegos, LPN   Solid Organ Transplant

## 2019-01-17 ENCOUNTER — TELEPHONE (OUTPATIENT)
Dept: TRANSPLANT | Facility: CLINIC | Age: 54
End: 2019-01-17

## 2019-01-17 DIAGNOSIS — Z79.60 LONG-TERM USE OF IMMUNOSUPPRESSANT MEDICATION: ICD-10-CM

## 2019-01-17 DIAGNOSIS — Z94.4 HISTORY OF LIVER TRANSPLANT (H): ICD-10-CM

## 2019-01-17 RX ORDER — OMEPRAZOLE 40 MG/1
40 CAPSULE, DELAYED RELEASE ORAL DAILY
Qty: 30 CAPSULE | Refills: 11 | Status: SHIPPED | OUTPATIENT
Start: 2019-01-17 | End: 2019-02-05

## 2019-01-17 NOTE — TELEPHONE ENCOUNTER
[facility] calling to request prior authorization for omeprazole 20mg BID,  Plan made to try omeprazole 40mg q day, ( insurance coverage for once/day most likely to be covered )  Camacho agrees with this plan.  Is now using [facility] pharmacy in [place], as required by his insurance company

## 2019-01-18 ENCOUNTER — TELEPHONE (OUTPATIENT)
Dept: TRANSPLANT | Facility: CLINIC | Age: 54
End: 2019-01-18

## 2019-01-23 ENCOUNTER — HOSPITAL ENCOUNTER (OUTPATIENT)
Dept: LAB | Facility: CLINIC | Age: 54
Discharge: HOME | End: 2019-01-23
Attending: INTERNAL MEDICINE
Payer: COMMERCIAL

## 2019-01-23 DIAGNOSIS — Z94.4 LIVER REPLACED BY TRANSPLANT (H): ICD-10-CM

## 2019-01-23 DIAGNOSIS — E87.5 HYPERKALEMIA: ICD-10-CM

## 2019-01-23 LAB
ALBUMIN SERPL-MCNC: 3.8 G/DL (ref 3.4–5)
ALP SERPL-CCNC: 277 U/L (ref 40–150)
ALT SERPL W P-5'-P-CCNC: 52 U/L (ref 0–70)
ANION GAP SERPL CALCULATED.3IONS-SCNC: 6 MMOL/L (ref 3–14)
AST SERPL W P-5'-P-CCNC: 35 U/L (ref 0–45)
BILIRUB DIRECT SERPL-MCNC: 0.3 MG/DL (ref 0–0.2)
BILIRUB SERPL-MCNC: 0.8 MG/DL (ref 0.2–1.3)
BUN SERPL-MCNC: 55 MG/DL (ref 7–30)
CALCIUM SERPL-MCNC: 8.7 MG/DL (ref 8.5–10.1)
CHLORIDE SERPL-SCNC: 109 MMOL/L (ref 94–109)
CO2 SERPL-SCNC: 23 MMOL/L (ref 20–32)
CREAT SERPL-MCNC: 1.66 MG/DL (ref 0.66–1.25)
ERYTHROCYTE [DISTWIDTH] IN BLOOD BY AUTOMATED COUNT: 13.7 % (ref 10–15)
GFR SERPL CREATININE-BSD FRML MDRD: 46 ML/MIN/{1.73_M2}
GLUCOSE SERPL-MCNC: 113 MG/DL (ref 70–99)
HCT VFR BLD AUTO: 33.8 % (ref 40–53)
HGB BLD-MCNC: 11.1 G/DL (ref 13.3–17.7)
MCH RBC QN AUTO: 30.7 PG (ref 26.5–33)
MCHC RBC AUTO-ENTMCNC: 32.8 G/DL (ref 31.5–36.5)
MCV RBC AUTO: 94 FL (ref 78–100)
PHOSPHATE SERPL-MCNC: 3.4 MG/DL (ref 2.5–4.5)
PLATELET # BLD AUTO: 79 10E9/L (ref 150–450)
POTASSIUM SERPL-SCNC: 5.4 MMOL/L (ref 3.4–5.3)
PROT SERPL-MCNC: 7.1 G/DL (ref 6.8–8.8)
RBC # BLD AUTO: 3.61 10E12/L (ref 4.4–5.9)
SODIUM SERPL-SCNC: 138 MMOL/L (ref 133–144)
TACROLIMUS BLD-MCNC: 5.8 UG/L (ref 5–15)
TME LAST DOSE: NORMAL H
WBC # BLD AUTO: 4.2 10E9/L (ref 4–11)

## 2019-01-23 PROCEDURE — 82248 BILIRUBIN DIRECT: CPT

## 2019-01-23 PROCEDURE — 84075 ASSAY ALKALINE PHOSPHATASE: CPT

## 2019-01-23 PROCEDURE — 84450 TRANSFERASE (AST) (SGOT): CPT

## 2019-01-23 PROCEDURE — 80069 RENAL FUNCTION PANEL: CPT

## 2019-01-23 PROCEDURE — 82247 BILIRUBIN TOTAL: CPT

## 2019-01-23 PROCEDURE — 84155 ASSAY OF PROTEIN SERUM: CPT

## 2019-01-23 PROCEDURE — 80197 ASSAY OF TACROLIMUS: CPT | Performed by: INTERNAL MEDICINE

## 2019-01-23 PROCEDURE — 84460 ALANINE AMINO (ALT) (SGPT): CPT

## 2019-01-23 PROCEDURE — 85027 COMPLETE CBC AUTOMATED: CPT

## 2019-01-23 PROCEDURE — 80076 HEPATIC FUNCTION PANEL: CPT

## 2019-01-23 PROCEDURE — 36415 COLL VENOUS BLD VENIPUNCTURE: CPT | Performed by: INTERNAL MEDICINE

## 2019-01-25 DIAGNOSIS — E87.5 HYPERKALEMIA: ICD-10-CM

## 2019-01-25 DIAGNOSIS — Z94.4 LIVER REPLACED BY TRANSPLANT (H): ICD-10-CM

## 2019-01-25 DIAGNOSIS — R80.1 PERSISTENT PROTEINURIA: ICD-10-CM

## 2019-01-25 DIAGNOSIS — I10 BENIGN ESSENTIAL HYPERTENSION: ICD-10-CM

## 2019-01-25 DIAGNOSIS — Z79.60 LONG-TERM USE OF IMMUNOSUPPRESSANT MEDICATION: ICD-10-CM

## 2019-01-25 RX ORDER — FUROSEMIDE 40 MG
40 TABLET ORAL 2 TIMES DAILY
Qty: 180 TABLET | Refills: 3 | Status: SHIPPED | OUTPATIENT
Start: 2019-01-25 | End: 2019-01-30

## 2019-01-25 RX ORDER — PREDNISONE 2.5 MG/1
2.5 TABLET ORAL DAILY
Qty: 90 TABLET | Refills: 3 | Status: SHIPPED | OUTPATIENT
Start: 2019-01-25 | End: 2020-01-24

## 2019-01-25 NOTE — TELEPHONE ENCOUNTER
Last Office Visit with Nephrologist:  12/5/2018.  Medication refilled per Nephrology Clinic protocol.    Quang Conteh RN

## 2019-01-30 DIAGNOSIS — I10 BENIGN ESSENTIAL HYPERTENSION: ICD-10-CM

## 2019-01-30 DIAGNOSIS — E87.5 HYPERKALEMIA: ICD-10-CM

## 2019-01-30 DIAGNOSIS — R80.1 PERSISTENT PROTEINURIA: ICD-10-CM

## 2019-01-30 RX ORDER — FUROSEMIDE 40 MG
40 TABLET ORAL 2 TIMES DAILY
Qty: 180 TABLET | Refills: 3 | Status: SHIPPED | OUTPATIENT
Start: 2019-01-30 | End: 2020-02-07

## 2019-01-30 NOTE — TELEPHONE ENCOUNTER
Patient requesting lasix be sent to the [facility] in [place], not [facility]. Order recent.    Quang Conteh, RN, BAN  Nephrology RN Care Coordinator

## 2019-02-05 ENCOUNTER — DOCUMENTATION ONLY (OUTPATIENT)
Dept: TRANSPLANT | Facility: CLINIC | Age: 54
End: 2019-02-05

## 2019-02-05 DIAGNOSIS — K21.9 ESOPHAGEAL REFLUX: Primary | ICD-10-CM

## 2019-02-05 DIAGNOSIS — Z94.4 HISTORY OF LIVER TRANSPLANT (H): ICD-10-CM

## 2019-02-05 DIAGNOSIS — Z79.60 LONG-TERM USE OF IMMUNOSUPPRESSANT MEDICATION: ICD-10-CM

## 2019-02-05 NOTE — TELEPHONE ENCOUNTER
Message left for Camacho goddard: prior auth for omeprazole, not accepted.   Detailed in message that a prior auth would be requested for omeprazole 20mg BID, with hopes that this request would be approved.

## 2019-02-05 NOTE — TELEPHONE ENCOUNTER
PA Initiation    Medication: Omeprazole 40 mg caps  Insurance Company: [employer] - Phone [phone] Fax [phone]  Pharmacy Filling the Rx:    Filling Pharmacy Phone:    Filling Pharmacy Fax:    Start Date: 2/5/2019    [facility] Authorization Team   Phone: [phone]  Fax: [phone]

## 2019-02-05 NOTE — TELEPHONE ENCOUNTER
Patient Call: Insurance  Route to LPN  Reason for Call: Prior authorization   Patient called and stated the insurance did not receive the PA

## 2019-02-06 ENCOUNTER — TELEPHONE (OUTPATIENT)
Dept: TRANSPLANT | Facility: CLINIC | Age: 54
End: 2019-02-06

## 2019-02-06 NOTE — Clinical Note
Camacho,  This pt's [employer] insurance won't cover omeprazole, though pt has GERD,  Am not clear about why this would be, can you help problem solve this?   Would it need a different diagnosis in order to be covered?   sang Samuels

## 2019-02-06 NOTE — TELEPHONE ENCOUNTER
Spoke with Camacho who reports that according to his insurance company pharmacy coverage PPI's have been taken off their formulary.   Discussed with Camacho, possibility of obtaining prescription from [facility].  Will also ask our speciality pharmacy if other medications would be covered for GERD by pt's insurance.

## 2019-02-06 NOTE — TELEPHONE ENCOUNTER
Patient Call:  Calling back [place]les[place] call Camacho [phone]    Call back needed? Yes    Return Call Needed  Same as documented in contacts section  When to return call?: Same day: Route High Priority

## 2019-02-13 ENCOUNTER — HOSPITAL ENCOUNTER (OUTPATIENT)
Dept: LAB | Facility: CLINIC | Age: 54
Discharge: HOME | End: 2019-02-13
Attending: INTERNAL MEDICINE | Admitting: INTERNAL MEDICINE
Payer: COMMERCIAL

## 2019-02-13 DIAGNOSIS — Z94.4 LIVER REPLACED BY TRANSPLANT (H): ICD-10-CM

## 2019-02-13 DIAGNOSIS — N18.30 CKD (CHRONIC KIDNEY DISEASE) STAGE 3, GFR 30-59 ML/MIN (H): ICD-10-CM

## 2019-02-13 LAB
ALBUMIN SERPL-MCNC: 4.1 G/DL (ref 3.4–5)
ALP SERPL-CCNC: 293 U/L (ref 40–150)
ALT SERPL W P-5'-P-CCNC: 55 U/L (ref 0–70)
ANION GAP SERPL CALCULATED.3IONS-SCNC: 7 MMOL/L (ref 3–14)
AST SERPL W P-5'-P-CCNC: 43 U/L (ref 0–45)
BILIRUB DIRECT SERPL-MCNC: 0.4 MG/DL (ref 0–0.2)
BILIRUB SERPL-MCNC: 0.9 MG/DL (ref 0.2–1.3)
BUN SERPL-MCNC: 57 MG/DL (ref 7–30)
CALCIUM SERPL-MCNC: 9.2 MG/DL (ref 8.5–10.1)
CHLORIDE SERPL-SCNC: 110 MMOL/L (ref 94–109)
CO2 SERPL-SCNC: 22 MMOL/L (ref 20–32)
CREAT SERPL-MCNC: 1.54 MG/DL (ref 0.66–1.25)
ERYTHROCYTE [DISTWIDTH] IN BLOOD BY AUTOMATED COUNT: 13.7 % (ref 10–15)
GFR SERPL CREATININE-BSD FRML MDRD: 50 ML/MIN/{1.73_M2}
GLUCOSE SERPL-MCNC: 74 MG/DL (ref 70–99)
HCT VFR BLD AUTO: 35.6 % (ref 40–53)
HGB BLD-MCNC: 12 G/DL (ref 13.3–17.7)
MCH RBC QN AUTO: 31.7 PG (ref 26.5–33)
MCHC RBC AUTO-ENTMCNC: 33.7 G/DL (ref 31.5–36.5)
MCV RBC AUTO: 94 FL (ref 78–100)
PHOSPHATE SERPL-MCNC: 3.4 MG/DL (ref 2.5–4.5)
PLATELET # BLD AUTO: 90 10E9/L (ref 150–450)
POTASSIUM SERPL-SCNC: 5.8 MMOL/L (ref 3.4–5.3)
PROT SERPL-MCNC: 7.4 G/DL (ref 6.8–8.8)
RBC # BLD AUTO: 3.79 10E12/L (ref 4.4–5.9)
SODIUM SERPL-SCNC: 139 MMOL/L (ref 133–144)
TACROLIMUS BLD-MCNC: 6.6 UG/L (ref 5–15)
TME LAST DOSE: NORMAL H
WBC # BLD AUTO: 4.9 10E9/L (ref 4–11)

## 2019-02-13 PROCEDURE — 36415 COLL VENOUS BLD VENIPUNCTURE: CPT | Performed by: INTERNAL MEDICINE

## 2019-02-13 PROCEDURE — 84450 TRANSFERASE (AST) (SGOT): CPT

## 2019-02-13 PROCEDURE — 84075 ASSAY ALKALINE PHOSPHATASE: CPT

## 2019-02-13 PROCEDURE — 84155 ASSAY OF PROTEIN SERUM: CPT

## 2019-02-13 PROCEDURE — 85027 COMPLETE CBC AUTOMATED: CPT

## 2019-02-13 PROCEDURE — 82248 BILIRUBIN DIRECT: CPT

## 2019-02-13 PROCEDURE — 80197 ASSAY OF TACROLIMUS: CPT | Performed by: INTERNAL MEDICINE

## 2019-02-13 PROCEDURE — 84460 ALANINE AMINO (ALT) (SGPT): CPT

## 2019-02-13 PROCEDURE — 82247 BILIRUBIN TOTAL: CPT

## 2019-02-13 PROCEDURE — 80069 RENAL FUNCTION PANEL: CPT

## 2019-02-14 ENCOUNTER — TELEPHONE (OUTPATIENT)
Dept: TRANSPLANT | Facility: CLINIC | Age: 54
End: 2019-02-14

## 2019-02-14 DIAGNOSIS — Z94.4 HISTORY OF LIVER TRANSPLANT (H): ICD-10-CM

## 2019-02-14 DIAGNOSIS — K21.9 ESOPHAGEAL REFLUX: ICD-10-CM

## 2019-02-14 DIAGNOSIS — Z79.60 LONG-TERM USE OF IMMUNOSUPPRESSANT MEDICATION: ICD-10-CM

## 2019-02-14 NOTE — TELEPHONE ENCOUNTER
Left message with Camacho that I will be changing his prescription for omeprazole back to 20 mg daily seeing that is what is insurance will only cover. I told him to try just this dose and if it does not work out, he will have to buy the rest OTC.

## 2019-02-14 NOTE — TELEPHONE ENCOUNTER
Patient Call: General  Route to LPN    Reason for call: patient returning call. Patient states that insurance will not cover the cost of is prescription and may needs a different code.    Call back needed? Yes    Return Call Needed  Same as documented in contacts section  When to return call?: Same day: Route High Priority

## 2019-02-14 NOTE — TELEPHONE ENCOUNTER
Per review by speciality pharmacy, omeprazole is not covered by pt's pharmacy.  Will request omeprazole 20mg q day, and plan to redirect pt to possibly purchasing over the counter.

## 2019-02-18 ENCOUNTER — TELEPHONE (OUTPATIENT)
Dept: SURGERY | Facility: CLINIC | Age: 54
End: 2019-02-18

## 2019-02-18 NOTE — TELEPHONE ENCOUNTER
[facility] Call Center    Phone Message    May a detailed message be left on voicemail: yes    Reason for Call: Other: Pt:   Pt is calling in w/ SX's, pt states he has been going to the bathroom 8-10 times a day (diarrhea for about week), has not had a solid stool for a week. Pt would like to come in ASAP to be evaluated, please review and call pt to further assist thanks. Writer pulled up Devin Foster CNP nothing until 2/28, pt states it's too long of a wait.     Action Taken: Message routed to:  [facility] Colon

## 2019-02-19 NOTE — TELEPHONE ENCOUNTER
Patient was called in regard to the below message. Patient states 2 days after completing his omeprazole he started having increased diarrhea, 8-10 times per day. Patient denies fever or chills but states he has had a low grade fever of 98-99. Patient states he feels similar to how he felt prior to having his colon removed. Patient is taking imodium every 3-4 days to help with the diarrhea. Patient was advised he can take this up to 4 times daily as needed to help with his bowel function. Patient is scheduled to follow up with Caryn Parikh on 3/8 at 10:00 am. Patient stated understanding of appointment date, time, and location. Patient is in agreement with this plan of care. Patient's questions and concerns were addressed to his stated satisfaction. Patient will callback directly with further questions or concerns. Patient's symptoms will be discussed with Caryn Parikh and his transplant coordinator and patient will receive a callback.

## 2019-03-04 ENCOUNTER — MYC MEDICAL ADVICE (OUTPATIENT)
Dept: INTERNAL MEDICINE | Facility: CLINIC | Age: 54
End: 2019-03-04

## 2019-03-04 DIAGNOSIS — M15.0 PRIMARY OSTEOARTHRITIS INVOLVING MULTIPLE JOINTS: ICD-10-CM

## 2019-03-04 DIAGNOSIS — M06.9 RHEUMATOID ARTHRITIS INVOLVING MULTIPLE SITES, UNSPECIFIED RHEUMATOID FACTOR PRESENCE: ICD-10-CM

## 2019-03-05 NOTE — TELEPHONE ENCOUNTER
Rx placed in lockbox. Felicia Langley CMA at 12:19 PM on 3/11/2019    Reason For Call:        Chief Complaint   Patient presents with     Refill Request                 Medication Name, Dose and Monthly Quantity:   Roxicodone 5 mg tablet # 30    Diagnosis requiring opiates:   Arthritis     Problem List Updated:   Yes     Opioid Agreement On File - [facility] PAIN CONTRACT ID# [id number]:       Last Urine Drug Screen (at least once every 12 months) Date:     Unexpected Results:        [place]  Data Reviewed (at least once every 3 months) Date:   03/05/19    Unexpected Results:    No     Last Fill Date:    12/05/18    Next Fill Date:   03/05/19   Last Visit with PCP:    09/17/18    Future Visits with PCP:    Nothing schedule  Processing:   Lock box      RO BONNER at 9:19 AM on 3/5/2019.

## 2019-03-06 ENCOUNTER — HOSPITAL ENCOUNTER (OUTPATIENT)
Dept: LAB | Facility: CLINIC | Age: 54
Discharge: HOME | End: 2019-03-06
Attending: INTERNAL MEDICINE | Admitting: INTERNAL MEDICINE
Payer: COMMERCIAL

## 2019-03-06 DIAGNOSIS — Z94.4 LIVER REPLACED BY TRANSPLANT (H): ICD-10-CM

## 2019-03-06 LAB
ALBUMIN SERPL-MCNC: 3.7 G/DL (ref 3.4–5)
ALP SERPL-CCNC: 292 U/L (ref 40–150)
ALT SERPL W P-5'-P-CCNC: 55 U/L (ref 0–70)
ANION GAP SERPL CALCULATED.3IONS-SCNC: 4 MMOL/L (ref 3–14)
AST SERPL W P-5'-P-CCNC: 34 U/L (ref 0–45)
BILIRUB DIRECT SERPL-MCNC: 0.3 MG/DL (ref 0–0.2)
BILIRUB SERPL-MCNC: 0.8 MG/DL (ref 0.2–1.3)
BUN SERPL-MCNC: 47 MG/DL (ref 7–30)
CALCIUM SERPL-MCNC: 8.5 MG/DL (ref 8.5–10.1)
CHLORIDE SERPL-SCNC: 111 MMOL/L (ref 94–109)
CO2 SERPL-SCNC: 24 MMOL/L (ref 20–32)
CREAT SERPL-MCNC: 1.33 MG/DL (ref 0.66–1.25)
ERYTHROCYTE [DISTWIDTH] IN BLOOD BY AUTOMATED COUNT: 13.8 % (ref 10–15)
GFR SERPL CREATININE-BSD FRML MDRD: 60 ML/MIN/{1.73_M2}
GLUCOSE SERPL-MCNC: 187 MG/DL (ref 70–99)
HCT VFR BLD AUTO: 35.3 % (ref 40–53)
HGB BLD-MCNC: 11.5 G/DL (ref 13.3–17.7)
MCH RBC QN AUTO: 31 PG (ref 26.5–33)
MCHC RBC AUTO-ENTMCNC: 32.6 G/DL (ref 31.5–36.5)
MCV RBC AUTO: 95 FL (ref 78–100)
PLATELET # BLD AUTO: 79 10E9/L (ref 150–450)
POTASSIUM SERPL-SCNC: 5.2 MMOL/L (ref 3.4–5.3)
PROT SERPL-MCNC: 6.8 G/DL (ref 6.8–8.8)
RBC # BLD AUTO: 3.71 10E12/L (ref 4.4–5.9)
SODIUM SERPL-SCNC: 139 MMOL/L (ref 133–144)
TACROLIMUS BLD-MCNC: 6.6 UG/L (ref 5–15)
TME LAST DOSE: NORMAL H
WBC # BLD AUTO: 4.5 10E9/L (ref 4–11)

## 2019-03-06 PROCEDURE — 80048 BASIC METABOLIC PNL TOTAL CA: CPT | Performed by: INTERNAL MEDICINE

## 2019-03-06 PROCEDURE — 85027 COMPLETE CBC AUTOMATED: CPT | Performed by: INTERNAL MEDICINE

## 2019-03-06 PROCEDURE — 80197 ASSAY OF TACROLIMUS: CPT | Performed by: INTERNAL MEDICINE

## 2019-03-06 PROCEDURE — 80076 HEPATIC FUNCTION PANEL: CPT | Performed by: INTERNAL MEDICINE

## 2019-03-06 PROCEDURE — 36415 COLL VENOUS BLD VENIPUNCTURE: CPT | Performed by: INTERNAL MEDICINE

## 2019-03-07 ENCOUNTER — TELEPHONE (OUTPATIENT)
Dept: SURGERY | Facility: CLINIC | Age: 54
End: 2019-03-07

## 2019-03-07 ENCOUNTER — TELEPHONE (OUTPATIENT)
Dept: TRANSPLANT | Facility: CLINIC | Age: 54
End: 2019-03-07

## 2019-03-07 DIAGNOSIS — Z94.4 LIVER TRANSPLANT RECIPIENT (H): ICD-10-CM

## 2019-03-07 NOTE — TELEPHONE ENCOUNTER
Please call Camacho about tacrolimus level 6.6,  Verify current dose 6mg am, 5mg pm, and decrease dose to 5mg Q 12 hours.  Check with Camacho if he is still taking prednisone?

## 2019-03-08 ENCOUNTER — OFFICE VISIT (OUTPATIENT)
Dept: SURGERY | Facility: CLINIC | Age: 54
End: 2019-03-08
Payer: COMMERCIAL

## 2019-03-08 ENCOUNTER — PATIENT OUTREACH (OUTPATIENT)
Dept: SURGERY | Facility: CLINIC | Age: 54
End: 2019-03-08

## 2019-03-08 VITALS
BODY MASS INDEX: 30.07 KG/M2 | HEIGHT: 72 IN | WEIGHT: 222 LBS | SYSTOLIC BLOOD PRESSURE: 161 MMHG | DIASTOLIC BLOOD PRESSURE: 84 MMHG | HEART RATE: 80 BPM | OXYGEN SATURATION: 100 %

## 2019-03-08 DIAGNOSIS — R19.7 DIARRHEA, UNSPECIFIED TYPE: Primary | ICD-10-CM

## 2019-03-08 ASSESSMENT — MIFFLIN-ST. JEOR: SCORE: 1884.99

## 2019-03-08 NOTE — TELEPHONE ENCOUNTER
Confirmed with pt that he is still taking prednisone 2.5mg daily and instructed pt to reduce tacro to 5mg BID. Pt was able to repeat instructions.

## 2019-03-08 NOTE — NURSING NOTE
Chief Complaint   Patient presents with     RECHECK       Vitals:    03/08/19 0959   BP: 161/84   BP Location: Left arm   Patient Position: Sitting   Cuff Size: Adult Regular   Pulse: 80   SpO2: 100%   Weight: 222 lb   Height: 6'       Body mass index is 30.11 kg/m .      Philippe Gordon, EMT

## 2019-03-08 NOTE — PROGRESS NOTES
Colon and Rectal Surgery Postoperative Clinic Note    RE: Camacho Bhagat  : 1965  BISI: 3/8/2019    Camacho Bhagat is a very pleasant 52 year old male with history of liver transplant (3/2017) who underwent emergent extended right hemicolectomy in the setting of an acute abdomen. He underwent ileostomy takedown (2018) c/b abdominal wall abscess at former drain site and  EUA with I and D on 18 with Dr. Dinh.   He presents today for diarrhea.    Interval history: Camacho has been doing well but developed severe diarrhea for about 21 days.  He was having 8-10 watery stools a day and lost 8-10 pounds during that time.  He feels that this started 2-3 days after stopping the omeprazole.  He reportedly contacted the transplant team to try to get insurance coverage for the omeprazole but has not heard back from them.  He denies any nausea or vomiting.  Over the past 3 days the diarrhea has significantly improved.  He is now having 2-5 loose to semi-formed stools a day.  He denies any fevers or chills.  He denies any abdominal pain.    Assessment/Plan:  I am happy that his diarrhea is improving.  Will attempt to get in contact with his transplant team for coverage for omeprazole as he found that this was very helpful for him and thinks his symptoms started after he stopped this.  However, if diarrhea returns, would recommend a colonoscopy given his transplant status.  If he is still having loose stools, will check stool studies as well.  If his symptoms continue to improve, no further follow-up with our clinic as needed. Patient's questions were answered to his stated satisfaction and he is in agreement with this plan.    Medical history:  Past Medical History:   Diagnosis Date     Depressive disorder, not elsewhere classified      Diabetes type 2, controlled (H) 11/10/2016     Esophageal reflux      Fibromyalgia 2009    dx with Dr Benitez( Rhe[facility])     Gangrene of finger (H) 2017     H/O deep venous  thrombosis 11/2001    Permanent IVC filter in place.     H/O CASTAÑEDA (nonalcoholic steatohepatitis)      H/O Pneumonia, organism unspecified(486) 10/2001    Included ARDS, sepsis, and  acute renal failure; hospitalized, required tracheostomy placement.     H/O: HTN (hypertension) 11/2001    No longer prescribed antihypertensive medication.       History of CVA (cerebrovascular accident)      History of hepatocellular carcinoma      History of liver transplant (H)      History of obstructive sleep apnea     No longer wears CPAP since losing approximately 200 pounds with his liver transplant and its complications.       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     Osteoarthritis      Presence of PERMANENT IVC filter      Rheumatoid arthritis(714.0)        Surgical history:  Past Surgical History:   Procedure Laterality Date     BENCH LIVER N/A 3/4/2017    Procedure: BENCH LIVER;  Surgeon: Jovan Tran MD;  Location:  OR      TOTAL KNEE ARTHROPLASTY  2008    Right knee arthroscopy     CHOLECYSTECTOMY       COLONOSCOPY N/A 7/21/2017    Procedure: COMBINED COLONOSCOPY, SINGLE OR MULTIPLE BIOPSY/POLYPECTOMY BY BIOPSY;  Colonoscopy;  Surgeon: Izaiah Montes MD;  [facility]     ENDOSCOPIC RETROGRADE CHOLANGIOPANCREATOGRAM N/A 5/22/2018    Procedure: COMBINED ENDOSCOPIC RETROGRADE CHOLANGIOPANCREATOGRAPHY, PLACE TUBE/STENT;  Endoscopic Retrograde Cholangiopancreatography with sphincterotomy and pancreatic duct stent placement;  Surgeon: Zay Gaitan MD;  [facility]     ENT SURGERY      Repair of deviated septum     ESOPHAGOSCOPY, GASTROSCOPY, DUODENOSCOPY (EGD), COMBINED N/A 8/4/2016    Procedure: COMBINED ESOPHAGOSCOPY, GASTROSCOPY, DUODENOSCOPY (EGD), BIOPSY SINGLE OR MULTIPLE;  Surgeon: Trent Pederson MD;  Location:  [facility]     ESOPHAGOSCOPY, GASTROSCOPY, DUODENOSCOPY (EGD), COMBINED N/A 2017    Procedure: COMBINED ESOPHAGOSCOPY, GASTROSCOPY, DUODENOSCOPY (EGD);;  Surgeon: Los Wynn MD;  [facility]     INCISION AND DRAINAGE ABDOMEN WASHOUT, COMBINED Right 2018    Procedure: COMBINED INCISION AND DRAINAGE ABDOMEN WASHOUT;  COMBINED INCISION AND DRAINAGE right ABDOMEN flank;  Surgeon: Sara Dinh MD;  [facility]     LAPAROTOMY EXPLORATORY N/A 2017    Procedure: LAPAROTOMY EXPLORATORY;  Exploratory Laparotomy, washout;  Surgeon: Tip Zhang MD;  [facility]     LAPAROTOMY EXPLORATORY N/A 2017    Procedure: LAPAROTOMY EXPLORATORY;  Exploratory Laparotomy, Washout with closure.;  Surgeon: Tip Zhang MD;  [facility]     LAPAROTOMY EXPLORATORY N/A 2017    Procedure: LAPAROTOMY EXPLORATORY;  Exploratory Laparotomy, Right angelique-colectomy, end ileostomy, mucosal fistula, partial omentectomy;  Surgeon: Sara Dinh MD;  [facility]     ORTHOPEDIC SURGERY Right     Repair of dislocated wrist.       RELEASE TRIGGER FINGER Right 11/10/2017    Procedure: RELEASE TRIGGER FINGER;  Debride, V-Y Flap Right Index Finger;  Surgeon: Momo Noonan MD;  [facility]     TAKEDOWN ILEOSTOMY N/A 2018    Procedure: TAKEDOWN ILEOSTOMY;  Exploratory Laparotomy, Lysis of Adhesions, Takedown Of End Ileostomy, Takedown of mucocutaneous fistula, ileocolic resection  And Colorectal Anastomosis, Primary repair of Ventral Hernia, Anesthesia Block ;  Surgeon: Sara Dinh MD;  [facility]     TRACHEOSTOMY  2001    During hospitalization for pneumonia.       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   Plan:  Increase tacrolimus dose, currently on low dose tacrolimus with level < 3.0.  Plan to increase for goal = 8.    Also on myfortic 720mg Q 12 hours.   Prednisone 2.5mg q day.           Abscess, intra-abdominal, postoperative 02/13/2018     Priority: Medium     Ileostomy in place (H) 01/05/2018     Priority: Medium     Peristomal skin complication 11/16/2017     Priority: Medium     Painful periwound skin 11/16/2017     Priority: Medium     Encounter for attention to ileostomy (H) 11/16/2017     Priority: Medium     History of creation of ostomy (H) 10/30/2017     Priority: Medium     Elevated serum creatinine 10/16/2017     Priority: Medium     Pancytopenia (H) 08/25/2017     Priority: Medium     Gangrene of finger (H) 08/25/2017     Priority: Medium     Ischemia of both lower extremities 08/25/2017     Priority: Medium     Distal ischemia due to shock/high pressor requirements       S/P liver transplant (H) 07/26/2017     Priority: Medium     Neutropenic colitis (H) 07/04/2017     Priority: Medium     Immunosuppressed status (H) 03/10/2017     Priority: Medium     Liver transplant recipient (H) 03/04/2017     Priority: Medium     Hyperkalemia 02/14/2017     Priority: Medium     Hepatocellular carcinoma (H) 01/27/2016     Priority: Medium     Osteoarthritis 01/18/2015     Priority: Medium     Rheumatoid arthritis (H) 01/18/2015     Priority: Medium     Medication refill- do not delete  11/13/2013     Priority: Medium     Fibromyalgia 08/15/2011     Priority: Medium     Sicca syndrome (H) 08/15/2011     Priority: Medium     Testicular hypofunction      Priority: Medium     Problem list name updated by automated process. Provider to review       Carpal tunnel  syndrome 07/08/2002     Priority: Medium       Medications:  Current Outpatient Medications   Medication Sig Dispense Refill     amLODIPine (NORVASC) 10 MG tablet Take 1 tablet (10 mg) by mouth daily 90 tablet 3     cholecalciferol (VITAMIN D3) 1000 UNIT tablet Take 1 tablet (1,000 Units) by mouth daily (Patient taking differently: Take 1,000 Units by mouth every morning ) 100 tablet 3     CIALIS 5 MG tablet Take 5 mg by mouth as needed   1     cyclobenzaprine (FLEXERIL) 10 MG tablet Take 1 tablet (10 mg) by mouth 3 times daily as needed for muscle spasms 90 tablet 3     furosemide (LASIX) 40 MG tablet Take 1 tablet (40 mg) by mouth 2 times daily 180 tablet 3     gabapentin (NEURONTIN) 300 MG capsule Take 1 capsule (300 mg) by mouth 3 times daily 270 capsule 3     Glucose Blood (BLOOD GLUCOSE TEST STRIPS) STRP 1 strip by Lancet route 4 times daily 100 each 11     insulin glargine (LANTUS SOLOSTAR PEN) 100 UNIT/ML pen Inject 52 Units Subcutaneous every morning 48 mL 3     Insulin Lispro (HUMALOG KWIKPEN) 200 UNIT/ML soln Use one unit per 3 grams carbohydrates for all meals and snacks. Total daily dose up to 80 U. 72 mL 3     insulin pen needle (BD ENE U/F) 32G X 4 MM miscellaneous Use 1 pen needle 4 times daily or as directed. 400 each 3     losartan (COZAAR) 50 MG tablet Take 0.5 tablets (25 mg) by mouth daily 90 tablet 3     metoprolol succinate (TOPROL-XL) 100 MG 24 hr tablet Take 1 tablet (100 mg) by mouth daily 90 tablet 3     multivitamin, therapeutic with minerals (MULTI-VITAMIN) TABS tablet Take 1 tablet by mouth every morning       mycophenolic acid (GENERIC EQUIVALENT) 360 MG EC tablet Take 2 tablets (720 mg) by mouth every 12 hours 360 tablet 3     oxyCODONE (ROXICODONE) 5 MG tablet Take 1 tablet (5 mg) by mouth every 4 hours as needed for pain maximum 6 tablet(s) per day 30 tablet 0     patiromer (VELTASSA) 8.4 g packet Take 16.8 g by mouth daily 180 each 3     predniSONE (DELTASONE) 2.5 MG tablet Take  2.5 mg by mouth daily. 90 tablet 3     tacrolimus (GENERIC EQUIVALENT) 1 MG capsule Take 6mg (6capsules) every morning, and take 5mg (5capsules) every evening, take every 12 hours. 330 capsule 11     ursodiol 500 MG TABS Take 500 mg by mouth 2 times daily 180 tablet 3     omeprazole (PRILOSEC) 20 MG DR capsule Take 1 capsule (20 mg) by mouth daily (Patient not taking: Reported on 3/8/2019) 90 capsule 3       Allergies:  Allergies   Allergen Reactions     Erythromycin GI Disturbance     Vioxx      Nausea, vomiting       Family history:  Family History   Problem Relation Age of Onset     Diabetes Father      Hypertension Father      Substance Abuse Father      Arthritis Mother      Thyroid Cancer Mother         Survivor!     Cervical Cancer Maternal Grandmother      Cerebrovascular Disease Maternal Grandfather      No Known Problems Paternal Grandmother      Prostate Cancer Paternal Grandfather      Colon Cancer No family hx of      Hyperlipidemia No family hx of      Coronary Artery Disease No family hx of      Breast Cancer No family hx of        Social history:  Social History     Tobacco Use     Smoking status: Former Smoker     Packs/day: 0.25     Years: 2.00     Pack years: 0.50     Types: Cigarettes     Last attempt to quit: 1985     Years since quittin.2     Smokeless tobacco: Former User     Types: Chew     Quit date: 10/8/2015     Tobacco comment: 1 Cigar once every other month.   Substance Use Topics     Alcohol use: No     Alcohol/week: 0.0 oz     Comment: No alcohol since liver transplant.       Marital status: .    Nursing Notes:   Philippe Gordon, EMT  3/8/2019 10:03 AM  Sign at exiting of workspace  Chief Complaint   Patient presents with     RECHECK       Vitals:    19 0959   BP: 161/84   BP Location: Left arm   Patient Position: Sitting   Cuff Size: Adult Regular   Pulse: 80   SpO2: 100%   Weight: 222 lb   Height: 6'       Body mass index is 30.11 kg/m .      Philippe Gordon  EMT                         Physical Examination:   /84 (BP Location: Left arm, Patient Position: Sitting, Cuff Size: Adult Regular)   Pulse 80   Ht 6'   Wt 222 lb   SpO2 100%   BMI 30.11 kg/m    General: alert, oriented, in no acute distress, sitting comfortably  HEENT: mucous membranes moist    Total face to face time was 15 minutes, >50% counseling.    LAMIN Queen, NP-C  Colon and Rectal Surgery  [facility]    This note was created using speech recognition software and may contain unintended word substitutions.

## 2019-03-08 NOTE — PROGRESS NOTES
Patient was called to discuss his appointment today and the possibility of getting a CT scan and stool studies. The direct phone number was left in a voicemail with a request for the patient to call back at his earliest convenience.     WOJCIECH Schneider Care Coordinator  [facility]al Surgery Clinic  Phone: [phone]

## 2019-03-08 NOTE — PATIENT INSTRUCTIONS
Follow up:    Please call with any questions or concerns regarding your clinic visit today.    It is a pleasure being involved in your health care.    Contacts post-consultation depending on your need:    Radiology Appointments [phone]    Schedule Clinic Appointments [phone] # 1   M-F 7:30 - 5 pm    WOJCIECH Schneider [phone]    Clinic Fax Number [phone]    Surgery Scheduling [phone]    My Chart is available 24 hours a day and is a secure way to access your records and communicate with your care team.  I strongly recommend signing up if you haven't already done so, if you are comfortable with computers.  If you would like to inquire about this or are having problems with My Chart access, you may call [phone] or go online at [email or web address].  Please allow at least 24 hours for a response and extra time on weekends and Holidays.

## 2019-03-08 NOTE — LETTER
3/8/2019       RE: Camacho Bhagat  [address] [facility] [address]     Dear Colleague,    Thank you for referring your patient, Camacho Bhagat, to the [facility] COLON AND RECTAL SURGERY at [facility]. Please see a copy of my visit note below.    Colon and Rectal Surgery Postoperative Clinic Note    RE: Camacho Bhagat  : 1965  BISI: 3/8/2019    Camacho Bhagat is a very pleasant 52 year old male with history of liver transplant (3/2017) who underwent emergent extended right hemicolectomy in the setting of an acute abdomen. He underwent ileostomy takedown (2018) c/b abdominal wall abscess at former drain site and  EUA with I and D on 18 with Dr. Dinh.   He presents today for diarrhea.    Interval history: Camacho has been doing well but developed severe diarrhea for about 21 days.  He was having 8-10 watery stools a day and lost 8-10 pounds during that time.  He feels that this started 2-3 days after stopping the omeprazole.  He reportedly contacted the transplant team to try to get insurance coverage for the omeprazole but has not heard back from them.  He denies any nausea or vomiting.  Over the past 3 days the diarrhea has significantly improved.  He is now having 2-5 loose to semi-formed stools a day.  He denies any fevers or chills.  He denies any abdominal pain.    Assessment/Plan:  I am happy that his diarrhea is improving.  Will attempt to get in contact with his transplant team for coverage for omeprazole as he found that this was very helpful for him and thinks his symptoms started after he stopped this.  However, if diarrhea returns, would recommend a colonoscopy given his transplant status.  If he is still having loose stools, will check stool studies as well.  If his symptoms continue to improve, no further follow-up with our clinic as needed. Patient's questions were answered to his stated satisfaction and he is in agreement with this  plan.    Medical history:  Past Medical History:   Diagnosis Date     Depressive disorder, not elsewhere classified      Diabetes type 2, controlled (H) 11/10/2016     Esophageal reflux      Fibromyalgia 1/2009    dx with Dr Benitez( Rheum)     Gangrene of finger (H) 8/25/2017     H/O deep venous thrombosis 11/2001    Permanent IVC filter in place.     H/O CASTAÑEDA (nonalcoholic steatohepatitis)      H/O Pneumonia, organism unspecified(486) 10/2001    Included ARDS, sepsis, and  acute renal failure; hospitalized, required tracheostomy placement.     H/O: HTN (hypertension) 11/2001    No longer prescribed antihypertensive medication.       History of CVA (cerebrovascular accident)      History of hepatocellular carcinoma      History of liver transplant (H)      History of obstructive sleep apnea     No longer wears CPAP since losing approximately 200 pounds with his liver transplant and its complications.       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     Osteoarthritis      Presence of PERMANENT IVC filter      Rheumatoid arthritis(714.0)        Surgical history:  Past Surgical History:   Procedure Laterality Date     BENCH LIVER N/A 3/4/2017    Procedure: BENCH LIVER;  Surgeon: Jovan Tran MD;  [facility]      TOTAL KNEE ARTHROPLASTY  2008    Right knee arthroscopy     CHOLECYSTECTOMY       COLONOSCOPY N/A 7/21/2017    Procedure: COMBINED COLONOSCOPY, SINGLE OR MULTIPLE BIOPSY/POLYPECTOMY BY BIOPSY;  Colonoscopy;  Surgeon: Izaiah Montes MD;  [facility]     ENDOSCOPIC RETROGRADE CHOLANGIOPANCREATOGRAM N/A 5/22/2018    Procedure: COMBINED ENDOSCOPIC RETROGRADE CHOLANGIOPANCREATOGRAPHY, PLACE TUBE/STENT;  Endoscopic Retrograde Cholangiopancreatography with sphincterotomy and pancreatic duct stent placement;  Surgeon: Arnie  Zay Amezquita MD;  [facility]     ENT SURGERY      Repair of deviated septum     ESOPHAGOSCOPY, GASTROSCOPY, DUODENOSCOPY (EGD), COMBINED N/A 8/4/2016    Procedure: COMBINED ESOPHAGOSCOPY, GASTROSCOPY, DUODENOSCOPY (EGD), BIOPSY SINGLE OR MULTIPLE;  Surgeon: Trent Pederson MD;  Location: [facility] GI     ESOPHAGOSCOPY, GASTROSCOPY, DUODENOSCOPY (EGD), COMBINED N/A 8/27/2017    Procedure: COMBINED ESOPHAGOSCOPY, GASTROSCOPY, DUODENOSCOPY (EGD);;  Surgeon: Los Wynn MD;  [facility]     INCISION AND DRAINAGE ABDOMEN WASHOUT, COMBINED Right 2/14/2018    Procedure: COMBINED INCISION AND DRAINAGE ABDOMEN WASHOUT;  COMBINED INCISION AND DRAINAGE right ABDOMEN flank;  Surgeon: Sara Dinh MD;  [facility]     LAPAROTOMY EXPLORATORY N/A 7/4/2017    Procedure: LAPAROTOMY EXPLORATORY;  Exploratory Laparotomy, washout;  Surgeon: Tip Zhang MD;  [facility]     LAPAROTOMY EXPLORATORY N/A 7/5/2017    Procedure: LAPAROTOMY EXPLORATORY;  Exploratory Laparotomy, Washout with closure.;  Surgeon: Tip Zhang MD;  [facility]     LAPAROTOMY EXPLORATORY N/A 7/26/2017    Procedure: LAPAROTOMY EXPLORATORY;  Exploratory Laparotomy, Right angelique-colectomy, end ileostomy, mucosal fistula, partial omentectomy;  Surgeon: Sara Dinh MD;  [facility]     ORTHOPEDIC SURGERY Right     Repair of dislocated wrist.       RELEASE TRIGGER FINGER Right 11/10/2017    Procedure: RELEASE TRIGGER FINGER;  Debride, V-Y Flap Right Index Finger;  Surgeon: Momo Noonan MD;  [facility]     TAKEDOWN ILEOSTOMY N/A 1/5/2018    Procedure: TAKEDOWN ILEOSTOMY;  Exploratory Laparotomy, Lysis of Adhesions, Takedown Of End Ileostomy, Takedown of mucocutaneous fistula, ileocolic resection  And Colorectal Anastomosis, Primary repair of Ventral Hernia, Anesthesia Block ;  Surgeon: Sara Dinh MD;  [facility]     TRACHEOSTOMY  2001    During hospitalization  for pneumonia.       TRANSPLANT LIVER RECIPIENT  DONOR N/A 3/4/2017    Procedure: TRANSPLANT LIVER RECIPIENT  DONOR;  Surgeon: Jovan Tran MD;  [facility]       Problem list:    Patient Active Problem List    Diagnosis Date Noted     Type 2 diabetes mellitus with diabetic nephropathy, with long-term current use of insulin (H) 09/10/2018     Priority: Medium     CMV colitis (H) 2018     Priority: Medium     Liver transplant rejection (H) 2018     Priority: Medium     Acute mild cellular rejection.   Plan:  Increase tacrolimus dose, currently on low dose tacrolimus with level < 3.0.  Plan to increase for goal = 8.    Also on myfortic 720mg Q 12 hours.   Prednisone 2.5mg q day.           Abscess, intra-abdominal, postoperative 2018     Priority: Medium     Ileostomy in place (H) 2018     Priority: Medium     Peristomal skin complication 2017     Priority: Medium     Painful periwound skin 2017     Priority: Medium     Encounter for attention to ileostomy (H) 2017     Priority: Medium     History of creation of ostomy (H) 10/30/2017     Priority: Medium     Elevated serum creatinine 10/16/2017     Priority: Medium     Pancytopenia (H) 2017     Priority: Medium     Gangrene of finger (H) 2017     Priority: Medium     Ischemia of both lower extremities 2017     Priority: Medium     Distal ischemia due to shock/high pressor requirements       S/P liver transplant (H) 2017     Priority: Medium     Neutropenic colitis (H) 2017     Priority: Medium     Immunosuppressed status (H) 03/10/2017     Priority: Medium     Liver transplant recipient (H) 2017     Priority: Medium     Hyperkalemia 2017     Priority: Medium     Hepatocellular carcinoma (H) 2016     Priority: Medium     Osteoarthritis 2015     Priority: Medium     Rheumatoid arthritis (H) 2015     Priority: Medium     Medication refill- do  not delete  11/13/2013     Priority: Medium     Fibromyalgia 08/15/2011     Priority: Medium     Sicca syndrome (H) 08/15/2011     Priority: Medium     Testicular hypofunction      Priority: Medium     Problem list name updated by automated process. Provider to review       Carpal tunnel syndrome 07/08/2002     Priority: Medium       Medications:  Current Outpatient Medications   Medication Sig Dispense Refill     amLODIPine (NORVASC) 10 MG tablet Take 1 tablet (10 mg) by mouth daily 90 tablet 3     cholecalciferol (VITAMIN D3) 1000 UNIT tablet Take 1 tablet (1,000 Units) by mouth daily (Patient taking differently: Take 1,000 Units by mouth every morning ) 100 tablet 3     CIALIS 5 MG tablet Take 5 mg by mouth as needed   1     cyclobenzaprine (FLEXERIL) 10 MG tablet Take 1 tablet (10 mg) by mouth 3 times daily as needed for muscle spasms 90 tablet 3     furosemide (LASIX) 40 MG tablet Take 1 tablet (40 mg) by mouth 2 times daily 180 tablet 3     gabapentin (NEURONTIN) 300 MG capsule Take 1 capsule (300 mg) by mouth 3 times daily 270 capsule 3     Glucose Blood (BLOOD GLUCOSE TEST STRIPS) STRP 1 strip by Lancet route 4 times daily 100 each 11     insulin glargine (LANTUS SOLOSTAR PEN) 100 UNIT/ML pen Inject 52 Units Subcutaneous every morning 48 mL 3     Insulin Lispro (HUMALOG KWIKPEN) 200 UNIT/ML soln Use one unit per 3 grams carbohydrates for all meals and snacks. Total daily dose up to 80 U. 72 mL 3     insulin pen needle (BD ENE U/F) 32G X 4 MM miscellaneous Use 1 pen needle 4 times daily or as directed. 400 each 3     losartan (COZAAR) 50 MG tablet Take 0.5 tablets (25 mg) by mouth daily 90 tablet 3     metoprolol succinate (TOPROL-XL) 100 MG 24 hr tablet Take 1 tablet (100 mg) by mouth daily 90 tablet 3     multivitamin, therapeutic with minerals (MULTI-VITAMIN) TABS tablet Take 1 tablet by mouth every morning       mycophenolic acid (GENERIC EQUIVALENT) 360 MG EC tablet Take 2 tablets (720 mg) by mouth  every 12 hours 360 tablet 3     oxyCODONE (ROXICODONE) 5 MG tablet Take 1 tablet (5 mg) by mouth every 4 hours as needed for pain maximum 6 tablet(s) per day 30 tablet 0     patiromer (VELTASSA) 8.4 g packet Take 16.8 g by mouth daily 180 each 3     predniSONE (DELTASONE) 2.5 MG tablet Take 2.5 mg by mouth daily. 90 tablet 3     tacrolimus (GENERIC EQUIVALENT) 1 MG capsule Take 6mg (6capsules) every morning, and take 5mg (5capsules) every evening, take every 12 hours. 330 capsule 11     ursodiol 500 MG TABS Take 500 mg by mouth 2 times daily 180 tablet 3     omeprazole (PRILOSEC) 20 MG DR capsule Take 1 capsule (20 mg) by mouth daily (Patient not taking: Reported on 3/8/2019) 90 capsule 3       Allergies:  Allergies   Allergen Reactions     Erythromycin GI Disturbance     Vioxx      Nausea, vomiting       Family history:  Family History   Problem Relation Age of Onset     Diabetes Father      Hypertension Father      Substance Abuse Father      Arthritis Mother      Thyroid Cancer Mother         Survivor!     Cervical Cancer Maternal Grandmother      Cerebrovascular Disease Maternal Grandfather      No Known Problems Paternal Grandmother      Prostate Cancer Paternal Grandfather      Colon Cancer No family hx of      Hyperlipidemia No family hx of      Coronary Artery Disease No family hx of      Breast Cancer No family hx of        Social history:  Social History     Tobacco Use     Smoking status: Former Smoker     Packs/day: 0.25     Years: 2.00     Pack years: 0.50     Types: Cigarettes     Last attempt to quit: 1985     Years since quittin.2     Smokeless tobacco: Former User     Types: Chew     Quit date: 10/8/2015     Tobacco comment: 1 Cigar once every other month.   Substance Use Topics     Alcohol use: No     Alcohol/week: 0.0 oz     Comment: No alcohol since liver transplant.       Marital status: .    Nursing Notes:   Philippe Gordon, EMT  3/8/2019 10:03 AM  Sign at exiting of  workspace  Chief Complaint   Patient presents with     RECHECK       Vitals:    03/08/19 0959   BP: 161/84   BP Location: Left arm   Patient Position: Sitting   Cuff Size: Adult Regular   Pulse: 80   SpO2: 100%   Weight: 222 lb   Height: 6'       Body mass index is 30.11 kg/m .      Philippe Gordon, MANNIE      Physical Examination:   /84 (BP Location: Left arm, Patient Position: Sitting, Cuff Size: Adult Regular)   Pulse 80   Ht 6'   Wt 222 lb   SpO2 100%   BMI 30.11 kg/m     General: alert, oriented, in no acute distress, sitting comfortably  HEENT: mucous membranes moist    Total face to face time was 15 minutes, >50% counseling.    LAMIN Queen, NP-C  Colon and Rectal Surgery  [facility]    This note was created using speech recognition software and may contain unintended word substitutions.

## 2019-03-11 RX ORDER — OXYCODONE HYDROCHLORIDE 5 MG/1
5 TABLET ORAL EVERY 4 HOURS PRN
Qty: 30 TABLET | Refills: 0 | Status: SHIPPED | OUTPATIENT
Start: 2019-03-11 | End: 2019-06-02

## 2019-03-11 RX ORDER — TACROLIMUS 1 MG/1
5 CAPSULE ORAL 2 TIMES DAILY
Qty: 300 CAPSULE | Refills: 11 | Status: SHIPPED | OUTPATIENT
Start: 2019-03-11 | End: 2019-04-26

## 2019-03-13 ENCOUNTER — PATIENT OUTREACH (OUTPATIENT)
Dept: SURGERY | Facility: CLINIC | Age: 54
End: 2019-03-13

## 2019-03-13 NOTE — PROGRESS NOTES
Patient called in stating he still has not heard back from his transplant coordinator. The back-line for transplant was called with no answer. Patient was advised another message would be sent to his transplant coordinator to reach out to patient to discuss his concerns.

## 2019-03-14 ENCOUNTER — TELEPHONE (OUTPATIENT)
Dept: TRANSPLANT | Facility: CLINIC | Age: 54
End: 2019-03-14

## 2019-03-14 ENCOUNTER — HOSPITAL ENCOUNTER (OUTPATIENT)
Dept: LAB | Facility: CLINIC | Age: 54
Discharge: HOME | End: 2019-03-14
Attending: NURSE PRACTITIONER | Admitting: NURSE PRACTITIONER
Payer: COMMERCIAL

## 2019-03-14 DIAGNOSIS — R19.7 DIARRHEA, UNSPECIFIED TYPE: ICD-10-CM

## 2019-03-14 PROCEDURE — 87077 CULTURE AEROBIC IDENTIFY: CPT | Performed by: NURSE PRACTITIONER

## 2019-03-14 PROCEDURE — 87493 C DIFF AMPLIFIED PROBE: CPT | Performed by: NURSE PRACTITIONER

## 2019-03-14 PROCEDURE — 87506 IADNA-DNA/RNA PROBE TQ 6-11: CPT | Performed by: NURSE PRACTITIONER

## 2019-03-14 PROCEDURE — 87177 OVA AND PARASITES SMEARS: CPT | Performed by: NURSE PRACTITIONER

## 2019-03-14 PROCEDURE — 87209 SMEAR COMPLEX STAIN: CPT | Performed by: NURSE PRACTITIONER

## 2019-03-14 PROCEDURE — 87046 STOOL CULTR AEROBIC BACT EA: CPT | Performed by: NURSE PRACTITIONER

## 2019-03-14 NOTE — TELEPHONE ENCOUNTER
Spoke with Camahco about concerns he has, he states that he did not get a call back regarding diarrhea that he has had, it is now better.  He is doing stool cultures, has the kits and will drop off the samples tomorrow.   Camacho states that his diarrhea started after his insurance would not continue to cover omeprazole BID.    He believes that this caused much of the diarrhea he has had.   He is aware of options to buy through [facility], directed him to check on the site [facility], which has options, coupons to find best cost for omeprazole to buy over the counter.     Reviewed with him that his labs are good, his creatinine is improved, and his white count is > 4.0.  Discussed with him that a message was sent to  re: reducing myfortic dose, which can contribute to diarrhea.    Will review with speciality pharmacy if there is a diagnosis code for omeprazole, that is not reflux, but colitis which may cover omeprazole BID dosing.

## 2019-03-14 NOTE — PROGRESS NOTES
Patient was called back in regard to his voicemail message stating he is having bowel issues again. Patient states he is back to having diarrhea and at times explosive diarrhea. Patient was advised to complete his stool samples as soon as he is able and we can rule out an infectious etiology. Patient was also advised to try some imodium as needed to help regulate his bowel movements. He will complete his stool studies and if these are unremarkable we will proceed to a colonoscopy. Patient is in agreement with this plan of care. Patient's questions and concerns were addressed to his stated satisfaction. Patient will callback directly with further questions or concerns.

## 2019-03-14 NOTE — TELEPHONE ENCOUNTER
Please call Camacho who was seen in colo-rectal surgery for diarrhea.   A message was sent by RN in that clinic stating that pt would like a call back about his medications.    If he is still having diarrhea even if not regularly, will ask  to review current immunosuppression, is taking myfortic and tacrolimus, and prednisone.   The myfortic can cause diarrhea, may be able to reduce that dose.   Most recent labs are good, with improved creatinine.

## 2019-03-15 ENCOUNTER — TELEPHONE (OUTPATIENT)
Dept: TRANSPLANT | Facility: CLINIC | Age: 54
End: 2019-03-15

## 2019-03-15 DIAGNOSIS — Z79.60 LONG-TERM USE OF IMMUNOSUPPRESSANT MEDICATION: ICD-10-CM

## 2019-03-15 DIAGNOSIS — Z94.4 HISTORY OF LIVER TRANSPLANT (H): ICD-10-CM

## 2019-03-15 LAB
C COLI+JEJUNI+LARI FUSA STL QL NAA+PROBE: NOT DETECTED
C DIFF TOX B STL QL: NEGATIVE
EC STX1 GENE STL QL NAA+PROBE: NOT DETECTED
EC STX2 GENE STL QL NAA+PROBE: NOT DETECTED
ENTERIC PATHOGEN COMMENT: NORMAL
NOROV GI+II ORF1-ORF2 JNC STL QL NAA+PR: NOT DETECTED
RVA NSP5 STL QL NAA+PROBE: NOT DETECTED
SALMONELLA SP RPOD STL QL NAA+PROBE: NOT DETECTED
SHIGELLA SP+EIEC IPAH STL QL NAA+PROBE: NOT DETECTED
SPECIMEN SOURCE: NORMAL
V CHOL+PARA RFBL+TRKH+TNAA STL QL NAA+PR: NOT DETECTED
Y ENTERO RECN STL QL NAA+PROBE: NOT DETECTED

## 2019-03-15 NOTE — TELEPHONE ENCOUNTER
Please call Camacho about with the following per ,  Decrease myfortic dose from 720mg Q 12 hours to 360mg Q 12 hours,  And plan to do labs next week with updated tacrolimus level,  okayed adding CMV DNA quant recheck, please add that to lab orders.

## 2019-03-16 NOTE — ANESTHESIA POSTPROCEDURE EVALUATION
Patient: Camacho Bhagat    Procedure(s):  Exploratory Laparotomy, Lysis of Adhesions, Takedown Of End Ileostomy, Takedown of mucocutaneous fistula, ileocolic resection  And Colorectal Anastomosis, Primary repair of Ventral Hernia, Anesthesia Block  - Wound Class: II-Clean Contaminated    Diagnosis:Colitis   Diagnosis Additional Information: No value filed.    Anesthesia Type:  General, ETT    Note:  Anesthesia Post Evaluation    Patient location during evaluation: PACU  Patient participation: Able to fully participate in evaluation  Level of consciousness: awake and alert  Pain management: adequate  Airway patency: patent  Cardiovascular status: acceptable  Respiratory status: acceptable  Hydration status: acceptable  PONV: none     Anesthetic complications: None          Last vitals:  Vitals:    01/05/18 0754   BP: 167/90   Temp: 36.9  C (98.4  F)   SpO2: 100%         Electronically Signed By: Randall Mike MD  January 5, 2018  12:38 PM  
normal...

## 2019-03-18 ENCOUNTER — PATIENT OUTREACH (OUTPATIENT)
Dept: SURGERY | Facility: CLINIC | Age: 54
End: 2019-03-18

## 2019-03-18 DIAGNOSIS — Z94.4 S/P LIVER TRANSPLANT (H): Primary | ICD-10-CM

## 2019-03-18 LAB
BACTERIA SPEC CULT: ABNORMAL
BACTERIA SPEC CULT: ABNORMAL
Lab: ABNORMAL
O+P STL MICRO: NORMAL
O+P STL MICRO: NORMAL
SPECIMEN SOURCE: ABNORMAL
SPECIMEN SOURCE: NORMAL

## 2019-03-18 RX ORDER — MYCOPHENOLIC ACID 360 MG/1
360 TABLET, DELAYED RELEASE ORAL EVERY 12 HOURS
Qty: 180 TABLET | Refills: 3 | Status: CANCELLED | OUTPATIENT
Start: 2019-03-18

## 2019-03-18 NOTE — PROGRESS NOTES
Patient was called in regard to his stool sample results. Patient was advised the stool culture came back positive for plesiomonas shigelliodes isolated. Per Caryn Parikh if patient is still symptomatic he needs to completed a 5 day course of cipro 50 mg BID. Patient states he has not had diarrhea since he gave the stool sample and he would like to hold off on the antibiotics at this time. However, he will callback directly if the diarrhea returns. Patient is in agreement with this plan of care. Patient's questions and concerns were addressed to his  stated satisfaction. Patient will callback directly with further questions or concerns.

## 2019-03-18 NOTE — TELEPHONE ENCOUNTER
Spoke to pt who has waited to decrease the myfortic until stool cx returned. One is positive for bacteria for bacteria therefore pt will not be decreasing the myfortic as pt has a PRN antibiotic should diarrhea develop. Informed Abril Doty RN and Abril agreed.

## 2019-03-21 ENCOUNTER — TELEPHONE (OUTPATIENT)
Dept: TRANSPLANT | Facility: CLINIC | Age: 54
End: 2019-03-21

## 2019-03-22 ENCOUNTER — PATIENT OUTREACH (OUTPATIENT)
Dept: SURGERY | Facility: CLINIC | Age: 54
End: 2019-03-22

## 2019-03-22 DIAGNOSIS — R19.7 DIARRHEA: Primary | ICD-10-CM

## 2019-03-22 RX ORDER — CIPROFLOXACIN 500 MG/1
500 TABLET, FILM COATED ORAL 2 TIMES DAILY
Qty: 10 TABLET | Refills: 0 | Status: SHIPPED | OUTPATIENT
Start: 2019-03-22 | End: 2019-04-03

## 2019-03-22 NOTE — TELEPHONE ENCOUNTER
Spoke to pt who states that the loose stools started last night so pt was instructed to start Cipro twice daily for 5 days by the colo-rectal department. Pt also started taking omeprazole OTC by there recommendation. Although our office has sent in a script for omeprazole, insurance will not cover the PPI. FYI.

## 2019-03-22 NOTE — PROGRESS NOTES
Patient called back stating he was doing good until last night around 10 pm when he started to have diarrhea again. Patient states he would now like to try the recommended antibiotics. Patient also states he will be trying over the counter omeprazole to see if this helps his diarrhea and acid reflux. Patient is in agreement with this plan of care. Patient's questions and concerns were addressed to his stated satisfaction. Patient will callback directly with further questions or concerns.

## 2019-03-22 NOTE — PROGRESS NOTES
Patient was called to follow up on his diarrhea. The direct phone number was left in a voicemail with a request for the patient to call back at his earliest convenience.     WOJCIECH Schneider Care Coordinator  Colon & Rectal Surgery Clinic  Phone: [phone]

## 2019-03-25 PROBLEM — A09 DIARRHEA OF INFECTIOUS ORIGIN: Status: ACTIVE | Noted: 2019-03-25

## 2019-03-27 ENCOUNTER — HOSPITAL ENCOUNTER (OUTPATIENT)
Dept: LAB | Facility: CLINIC | Age: 54
Discharge: HOME | End: 2019-03-27
Attending: INTERNAL MEDICINE | Admitting: INTERNAL MEDICINE
Payer: COMMERCIAL

## 2019-03-27 DIAGNOSIS — Z94.4 LIVER REPLACED BY TRANSPLANT (H): ICD-10-CM

## 2019-03-27 DIAGNOSIS — Z94.4 S/P LIVER TRANSPLANT (H): ICD-10-CM

## 2019-03-27 DIAGNOSIS — E87.5 HYPERKALEMIA: ICD-10-CM

## 2019-03-27 LAB
ALBUMIN SERPL-MCNC: 3.7 G/DL (ref 3.4–5)
ALBUMIN UR-MCNC: 30 MG/DL
ALP SERPL-CCNC: 270 U/L (ref 40–150)
ALT SERPL W P-5'-P-CCNC: 48 U/L (ref 0–70)
AMORPH CRY #/AREA URNS HPF: ABNORMAL /HPF
ANION GAP SERPL CALCULATED.3IONS-SCNC: 3 MMOL/L (ref 3–14)
APPEARANCE UR: CLEAR
AST SERPL W P-5'-P-CCNC: 29 U/L (ref 0–45)
BILIRUB DIRECT SERPL-MCNC: 0.3 MG/DL (ref 0–0.2)
BILIRUB SERPL-MCNC: 0.6 MG/DL (ref 0.2–1.3)
BILIRUB UR QL STRIP: NEGATIVE
BUN SERPL-MCNC: 46 MG/DL (ref 7–30)
CALCIUM SERPL-MCNC: 9.1 MG/DL (ref 8.5–10.1)
CHLORIDE SERPL-SCNC: 114 MMOL/L (ref 94–109)
CO2 SERPL-SCNC: 25 MMOL/L (ref 20–32)
COLOR UR AUTO: YELLOW
CREAT SERPL-MCNC: 1.43 MG/DL (ref 0.66–1.25)
CREAT UR-MCNC: 89 MG/DL
ERYTHROCYTE [DISTWIDTH] IN BLOOD BY AUTOMATED COUNT: 14.1 % (ref 10–15)
GFR SERPL CREATININE-BSD FRML MDRD: 55 ML/MIN/{1.73_M2}
GLUCOSE SERPL-MCNC: 152 MG/DL (ref 70–99)
GLUCOSE UR STRIP-MCNC: 70 MG/DL
HCT VFR BLD AUTO: 36.7 % (ref 40–53)
HGB BLD-MCNC: 12 G/DL (ref 13.3–17.7)
HGB UR QL STRIP: NEGATIVE
KETONES UR STRIP-MCNC: NEGATIVE MG/DL
LEUKOCYTE ESTERASE UR QL STRIP: NEGATIVE
MCH RBC QN AUTO: 30.7 PG (ref 26.5–33)
MCHC RBC AUTO-ENTMCNC: 32.7 G/DL (ref 31.5–36.5)
MCV RBC AUTO: 94 FL (ref 78–100)
MUCOUS THREADS #/AREA URNS LPF: PRESENT /LPF
NITRATE UR QL: NEGATIVE
PH UR STRIP: 5 PH (ref 5–7)
PHOSPHATE SERPL-MCNC: 3.2 MG/DL (ref 2.5–4.5)
PLATELET # BLD AUTO: 83 10E9/L (ref 150–450)
POTASSIUM SERPL-SCNC: 5.9 MMOL/L (ref 3.4–5.3)
PROT SERPL-MCNC: 7 G/DL (ref 6.8–8.8)
PROT UR-MCNC: 0.36 G/L
PROT/CREAT 24H UR: 0.41 G/G CR (ref 0–0.2)
RBC # BLD AUTO: 3.91 10E12/L (ref 4.4–5.9)
RBC #/AREA URNS AUTO: 1 /HPF (ref 0–2)
SODIUM SERPL-SCNC: 142 MMOL/L (ref 133–144)
SOURCE: ABNORMAL
SP GR UR STRIP: 1.01 (ref 1–1.03)
SQUAMOUS #/AREA URNS AUTO: <1 /HPF (ref 0–1)
TACROLIMUS BLD-MCNC: 6.1 UG/L (ref 5–15)
TME LAST DOSE: NORMAL H
UROBILINOGEN UR STRIP-MCNC: NORMAL MG/DL (ref 0–2)
WBC # BLD AUTO: 4.1 10E9/L (ref 4–11)
WBC #/AREA URNS AUTO: 0 /HPF (ref 0–5)

## 2019-03-27 PROCEDURE — 80069 RENAL FUNCTION PANEL: CPT

## 2019-03-27 PROCEDURE — 82247 BILIRUBIN TOTAL: CPT

## 2019-03-27 PROCEDURE — 80197 ASSAY OF TACROLIMUS: CPT | Performed by: INTERNAL MEDICINE

## 2019-03-27 PROCEDURE — 36415 COLL VENOUS BLD VENIPUNCTURE: CPT | Performed by: INTERNAL MEDICINE

## 2019-03-27 PROCEDURE — 84155 ASSAY OF PROTEIN SERUM: CPT

## 2019-03-27 PROCEDURE — 80048 BASIC METABOLIC PNL TOTAL CA: CPT

## 2019-03-27 PROCEDURE — 84156 ASSAY OF PROTEIN URINE: CPT | Performed by: INTERNAL MEDICINE

## 2019-03-27 PROCEDURE — 84450 TRANSFERASE (AST) (SGOT): CPT

## 2019-03-27 PROCEDURE — 82248 BILIRUBIN DIRECT: CPT

## 2019-03-27 PROCEDURE — 36415 COLL VENOUS BLD VENIPUNCTURE: CPT

## 2019-03-27 PROCEDURE — 85027 COMPLETE CBC AUTOMATED: CPT

## 2019-03-27 PROCEDURE — 81001 URINALYSIS AUTO W/SCOPE: CPT | Performed by: INTERNAL MEDICINE

## 2019-03-27 PROCEDURE — 84075 ASSAY ALKALINE PHOSPHATASE: CPT

## 2019-03-27 PROCEDURE — 84460 ALANINE AMINO (ALT) (SGPT): CPT

## 2019-03-29 ENCOUNTER — PATIENT OUTREACH (OUTPATIENT)
Dept: SURGERY | Facility: CLINIC | Age: 54
End: 2019-03-29

## 2019-03-29 NOTE — PROGRESS NOTES
Patient was called to reassess his diarrhea. Patient states after completing his antibiotics he is completely back to normal. Patient will follow up if he has any further questions or concerns.

## 2019-04-03 ENCOUNTER — OFFICE VISIT (OUTPATIENT)
Dept: NEPHROLOGY | Facility: CLINIC | Age: 54
End: 2019-04-03
Payer: COMMERCIAL

## 2019-04-03 VITALS
OXYGEN SATURATION: 98 % | TEMPERATURE: 99.5 F | HEART RATE: 88 BPM | DIASTOLIC BLOOD PRESSURE: 79 MMHG | SYSTOLIC BLOOD PRESSURE: 146 MMHG

## 2019-04-03 DIAGNOSIS — N18.30 ANEMIA OF CHRONIC RENAL FAILURE, STAGE 3 (MODERATE) (H): ICD-10-CM

## 2019-04-03 DIAGNOSIS — I10 ESSENTIAL HYPERTENSION: ICD-10-CM

## 2019-04-03 DIAGNOSIS — N18.30 CKD (CHRONIC KIDNEY DISEASE) STAGE 3, GFR 30-59 ML/MIN (H): Primary | ICD-10-CM

## 2019-04-03 DIAGNOSIS — J31.0 RHINITIS, UNSPECIFIED TYPE: ICD-10-CM

## 2019-04-03 DIAGNOSIS — E55.9 VITAMIN D DEFICIENCY: ICD-10-CM

## 2019-04-03 DIAGNOSIS — D63.1 ANEMIA OF CHRONIC RENAL FAILURE, STAGE 3 (MODERATE) (H): ICD-10-CM

## 2019-04-03 PROCEDURE — G0463 HOSPITAL OUTPT CLINIC VISIT: HCPCS | Mod: ZF

## 2019-04-03 RX ORDER — FLUTICASONE PROPIONATE 50 MCG
1 SPRAY, SUSPENSION (ML) NASAL DAILY
Qty: 15 ML | Refills: 1 | Status: SHIPPED | OUTPATIENT
Start: 2019-04-03 | End: 2022-06-06

## 2019-04-03 RX ORDER — METOPROLOL SUCCINATE 100 MG/1
100 TABLET, EXTENDED RELEASE ORAL DAILY
Qty: 90 TABLET | Refills: 3 | Status: SHIPPED | OUTPATIENT
Start: 2019-04-03 | End: 2019-08-07

## 2019-04-03 RX ORDER — METOPROLOL SUCCINATE 50 MG/1
50 TABLET, EXTENDED RELEASE ORAL DAILY
Qty: 90 TABLET | Refills: 3 | Status: SHIPPED | OUTPATIENT
Start: 2019-04-03 | End: 2019-08-07 | Stop reason: DRUGHIGH

## 2019-04-03 ASSESSMENT — PAIN SCALES - GENERAL: PAINLEVEL: NO PAIN (0)

## 2019-04-03 NOTE — NURSING NOTE
Chief Complaint   Patient presents with     RECHECK     4 month follow up     Vital signs:  Temp: 99.5  F (37.5  C)   BP: 146/79 Pulse: 88     SpO2: 98 %       Weight: (228.0 lb per pt.)  Estimated body mass index is 30.11 kg/m  as calculated from the following:    Height as of 3/8/19: 1.829 m (6').    Weight as of 3/8/19: 100.7 kg (222 lb).        Marcella Rowe CMA

## 2019-04-03 NOTE — LETTER
4/3/2019      RE: Camacho Bhagat  [address]  [facility] [address]       Nephrology Clinic Visit 4/3/19    Assessment and Plan:       1. CKD3 w/mild proteinuria - Stable. Creat 1.4, eGFR 55 ml/mn. UPCR 0.4 g/gCr  despite being off Losartan x 1 month  Baseline in the mid 1 range  Etiology of CKD likely multifactorial; DM, recurrent EJ, CNI.    - Had been intolerant of ACE/ARB previously given problems with hyperkalemia assoc with Tacrolimus, but retrial when TAC level was <0.3 did not result in Hyperkalemia. In fact Valtessa had been discontinued because he was becoming hypokalemic.    - Patient was restarted on ARB in 4/18 for unexplained nephrotic range proteinuria following ostomy takedown with improvement in UPCR, but then developed mild acute rejection and Tac dose was increased resulting in hyperkalemia. He was restarted on Valtassa with improvement in hyperkalemia   - Proteinuria remains minimal despite being off ARB and higher than target blood pressures   - Does not use NSAIDs.    - Lab frequency per Transplant   - A1c goal is 7%. HgA1c 6.1% Nov 2018      2. HTN -  Uncontrolled. Home blood pressures 135-145/75-85. Clinic blood pressure today 144-148/79-81 HR 8 0. No edema. No dyspnea or CP.   Current antihypertensive regimen:      Lasix 40 mg bid     Amlodipine 10 mg qd      Toprol  mg every day       Losartan self discontinued one month ago  - Increase Toprol XL to 150 mg every day  - Stop sudafed and use Flonase or Nasocort   - Continue to monitor blood pressures    - Will call in 2 wks to assess b/p control.       3. Hyperkalemia assoc with Tac/ARB - Potassium 5.9 despite being off Losartan, being on Valtessa and decreasing TAC dose.    - Patient asked to recheck K today given that specimen may be hemolyzed (patient notes that he is a difficult stick)    - Continue Valtassa 16.8 g/day.    - He tries to modify his diet w/respect to K foods      4. Volume status - Euvolemic. No  edema/dyspnea. Does have increased stools when TAC dose is increased. No weight today. Was Weight 220#  in 12/18. Blood pressures slightly elevated   - Continue Lasix 40 mg bid.       5. Acid base - No acute concerns. Metabolic acidosis resolved following ileostomy takedown. Bicarb 25.   Off supplement.       6. BMD - Ca 9.1, Phos 3.2, albumin 3.7   - Vitamin D 38 (10/18)   - PTH 22 (10/18).    - Continue Vit D 1000 U qd      7.Anemia -Hgb 12.0.    - Iron studies 7/18: Ferritin 1338, Fe 166, IS 73%   - Had colonoscopy 2017 (poor study), Ileoscopy 8/17 - nml   - Not on iron and has not needed DIPAK      8. Liver transplant IS: Tacrolimus, MMF, Pred. Tac level 6. 1   - Tacro dose being decreased per patient      9. Dispo- RCT 4 months for f/u      Reason for Visit:  CKD3/HTN follow up        HPI:  Mr Bhagat is a 52 yo male with CKD3, HTN, Chronic hyperkalemia, Liver transplant, in today for CKD/HTN followup. Last seen in clinic by me on  12/5/18. Since our last visit patient was asked to decrease his Losartan to 25 mg every day due to persistent hyperkalemia. He has since self discontinued. Baseline creat mid 1's.       Interval Hx:     No hospital admissions.       ROS:  Patient notes he has been off Losartan x 1 month and home blood pressures 135-145/78-85. Currently with URI. Taking intermit Sudafed. No fever. Denies dyspnea,  CP, abdominal pain. Voiding w/o difficulty. Exercising regularly. Appetite is good.         Active Medical Problems:      ESLD 2/2 cryptogenic cirrhosis s/p liver tx 3/17  HCC s/p TACE 9/16  H/O Neutropenic colitis/ischemic bowel s/p colectomy 7/17 w/takedown 1/18  Recurrent hyperkalemia  Recurrent EJ  CKD3  HTN  GERD  Depression  CTS  HLD  RONNIE  Fibromyalgia  OA  Anemia  T2DM  Malnutrition  Metabolic Acidosis  Hypomagnesemia      Personal Hx:   , lives with wife/daughter/dog. Former smoker,   by profession. Exercising regularly.    Family Hx:   Family History   Problem  Relation Age of Onset     Diabetes Father      Hypertension Father      Substance Abuse Father      Arthritis Mother      Thyroid Cancer Mother         Survivor!     Cervical Cancer Maternal Grandmother      Cerebrovascular Disease Maternal Grandfather      No Known Problems Paternal Grandmother      Prostate Cancer Paternal Grandfather      Colon Cancer No family hx of      Hyperlipidemia No family hx of      Coronary Artery Disease No family hx of      Breast Cancer No family hx of        Allergies:  Allergies   Allergen Reactions     Erythromycin GI Disturbance     Vioxx      Nausea, vomiting       Medications:  Current Outpatient Medications   Medication Sig     amLODIPine (NORVASC) 10 MG tablet Take 1 tablet (10 mg) by mouth daily     cholecalciferol (VITAMIN D3) 1000 UNIT tablet Take 1 tablet (1,000 Units) by mouth daily (Patient taking differently: Take 1,000 Units by mouth every morning )     cyclobenzaprine (FLEXERIL) 10 MG tablet Take 1 tablet (10 mg) by mouth 3 times daily as needed for muscle spasms     fluticasone (FLONASE) 50 MCG/ACT nasal spray Spray 1 spray into both nostrils daily     furosemide (LASIX) 40 MG tablet Take 1 tablet (40 mg) by mouth 2 times daily     gabapentin (NEURONTIN) 300 MG capsule Take 1 capsule (300 mg) by mouth 3 times daily     Glucose Blood (BLOOD GLUCOSE TEST STRIPS) STRP 1 strip by Lancet route 4 times daily     insulin glargine (LANTUS SOLOSTAR PEN) 100 UNIT/ML pen Inject 52 Units Subcutaneous every morning     Insulin Lispro (HUMALOG KWIKPEN) 200 UNIT/ML soln Use one unit per 3 grams carbohydrates for all meals and snacks. Total daily dose up to 80 U.     insulin pen needle (BD ENE U/F) 32G X 4 MM miscellaneous Use 1 pen needle 4 times daily or as directed.     metoprolol succinate ER (TOPROL-XL) 100 MG 24 hr tablet Take 1 tablet (100 mg) by mouth daily Take a total of 150 mg daily     metoprolol succinate ER (TOPROL-XL) 50 MG 24 hr tablet Take 1 tablet (50 mg) by mouth  daily Take a total of 150 mg daily     multivitamin, therapeutic with minerals (MULTI-VITAMIN) TABS tablet Take 1 tablet by mouth every morning     mycophenolic acid (GENERIC EQUIVALENT) 360 MG EC tablet Take 2 tablets (720 mg) by mouth every 12 hours     omeprazole (PRILOSEC) 20 MG DR capsule Take 1 capsule (20 mg) by mouth daily     oxyCODONE (ROXICODONE) 5 MG tablet Take 1 tablet (5 mg) by mouth every 4 hours as needed for pain maximum 6 tablet(s) per day     patiromer (VELTASSA) 8.4 g packet Take 16.8 g by mouth daily     predniSONE (DELTASONE) 2.5 MG tablet Take 2.5 mg by mouth daily.     tacrolimus (GENERIC EQUIVALENT) 1 MG capsule Take 5 capsules (5 mg) by mouth 2 times daily     ursodiol 500 MG TABS Take 500 mg by mouth 2 times daily     CIALIS 5 MG tablet Take 5 mg by mouth as needed      No current facility-administered medications for this visit.       Vitals:  /79   Pulse 88   Temp 99.5  F (37.5  C)   SpO2 98%     Exam:    Gen: Well groomed, pleasant  CARDIAC: RRR  LUNGS: CTA  ABDOMEN: soft, NT  EXT: No edema  NEURO: Alert, oriented.    LABS:   CMP  Recent Labs   Lab Test 03/27/19  1223 03/06/19  1156 02/13/19  1156 01/23/19  1230    139 139 138   POTASSIUM 5.9* 5.2 5.8* 5.4*   CHLORIDE 114* 111* 110* 109   CO2 25 24 22 23   ANIONGAP 3 4 7 6   * 187* 74 113*   BUN 46* 47* 57* 55*   CR 1.43* 1.33* 1.54* 1.66*   GFRESTIMATED 55* 60* 50* 46*   GFRESTBLACK 64 70 58* 53*   JACOB 9.1 8.5 9.2 8.7     Recent Labs   Lab Test 03/27/19  1223 03/06/19  1156 02/13/19  1156 01/23/19  1230   BILITOTAL 0.6 0.8 0.9 0.8   ALKPHOS 270* 292* 293* 277*   ALT 48 55 55 52   AST 29 34 43 35     CBC  Recent Labs   Lab Test 03/27/19  1223 03/06/19  1156 02/13/19  1156 01/23/19  1230   HGB 12.0* 11.5* 12.0* 11.1*   WBC 4.1 4.5 4.9 4.2   RBC 3.91* 3.71* 3.79* 3.61*   HCT 36.7* 35.3* 35.6* 33.8*   MCV 94 95 94 94   MCH 30.7 31.0 31.7 30.7   MCHC 32.7 32.6 33.7 32.8   RDW 14.1 13.8 13.7 13.7   PLT 83* 79* 90* 79*      URINE STUDIES  Recent Labs   Lab Test 03/27/19  1155 04/11/18  1446 02/26/18  1115 01/29/18  1235   COLOR Yellow Yellow Yellow Yellow   APPEARANCE Clear Slightly Cloudy Clear Clear   URINEGLC 70* >499* >499* 150*   URINEBILI Negative Negative Negative Negative   URINEKETONE Negative Negative Negative Negative   SG 1.014 1.010 1.010 1.013   UBLD Negative Small* Negative Small*   URINEPH 5.0 5.0 5.0 6.0   PROTEIN 30* 100* 100* >499*   NITRITE Negative Negative Negative Negative   LEUKEST Negative Negative Negative Negative   RBCU 1 3* 2 4*   WBCU 0 <1 1 4*     Recent Labs   Lab Test 03/27/19  1155 11/27/18  1110 09/18/18  1200 08/17/18  1050   UTPG 0.41* 0.31* 0.28* 0.51*     PTH  Recent Labs   Lab Test 10/10/18  1059 09/18/18  1212 09/14/17  1133   PTHI 22 28 34     IRON STUDIES  Recent Labs   Lab Test 07/10/18  1022 03/06/18  1029 02/05/18  1108   IRON 166 143 44   * 228* 166*   IRONSAT 73* 63* 26   NELY 1,338* 1,362* 1,014*       JUANITO Rojas NP

## 2019-04-03 NOTE — PATIENT INSTRUCTIONS
1. Increasing Toprol to 150 mg daily (100 mg + 50 mg tab)  2. Labs Friday  3. Either Flonase or Nasocort nasal spray.

## 2019-04-04 NOTE — PROGRESS NOTES
Nephrology Clinic Visit 4/3/19    Assessment and Plan:       1. CKD3 w/mild proteinuria - Stable. Creat 1.4, eGFR 55 ml/mn. UPCR 0.4 g/gCr despite being off Losartan x 1 month  Baseline in the mid 1 range  Etiology of CKD likely multifactorial; DM, recurrent EJ, CNI.    - Had been intolerant of ACE/ARB previously given problems with hyperkalemia assoc with Tacrolimus, but retrial when TAC level was <0.3 did not result in Hyperkalemia. In fact Valtessa had been discontinued because he was becoming hypokalemic.    - Patient was restarted on ARB in 4/18 for unexplained nephrotic range proteinuria following ostomy takedown with improvement in UPCR, but then developed mild acute rejection and Tac dose was increased resulting in hyperkalemia. He was restarted on Valtassa with improvement in hyperkalemia   - Proteinuria remains minimal despite being off ARB and higher than target blood pressures   - Does not use NSAIDs.    - Lab frequency per Transplant   - A1c goal is 7%. HgA1c 6.1% Nov 2018      2. HTN - Uncontrolled. Home blood pressures 135-145/75-85. Clinic blood pressure today 144-148/79-81 HR 80. No edema. No dyspnea or CP.   Current antihypertensive regimen:      Lasix 40 mg bid     Amlodipine 10 mg qd      Toprol  mg every day       Losartan self discontinued one month ago  - Increase Toprol XL to 150 mg every day  - Stop sudafed and use Flonase or Nasocort   - Continue to monitor blood pressures    - Will call in 2 wks to assess b/p control.       3. Hyperkalemia assoc with Tac/ARB - Potassium 5.9 despite being off Losartan, being on Valtessa and decreasing TAC dose.    - Patient asked to recheck K today given that specimen may be hemolyzed (patient notes that he is a difficult stick)    - Continue Valtassa 16.8 g/day.    - He tries to modify his diet w/respect to K foods      4. Volume status - Euvolemic. No edema/dyspnea. Does have increased stools when TAC dose is increased. No weight today. Was Weight  220# in 12/18. Blood pressures slightly elevated   - Continue Lasix 40 mg bid.       5. Acid base - No acute concerns. Metabolic acidosis resolved following ileostomy takedown. Bicarb 25.   Off supplement.       6. BMD - Ca 9.1, Phos 3.2, albumin 3.7   - Vitamin D 38 (10/18)   - PTH 22 (10/18).    - Continue Vit D 1000 U qd      7.Anemia -Hgb 12.0.    - Iron studies 7/18: Ferritin 1338, Fe 166, IS 73%   - Had colonoscopy 2017 (poor study), Ileoscopy 8/17 - nml   - Not on iron and has not needed DIPAK      8. Liver transplant IS: Tacrolimus, MMF, Pred. Tac level 6.1   - Tacro dose being decreased per patient      9. Dispo- RCT 4 months for f/u      Reason for Visit:  CKD3/HTN follow up        HPI:  Mr Bhagat is a 52 yo male with CKD3, HTN, Chronic hyperkalemia, Liver transplant, in today for CKD/HTN followup. Last seen in clinic by me on 12/5/18. Since our last visit patient was asked to decrease his Losartan to 25 mg every day due to persistent hyperkalemia. He has since self discontinued. Baseline creat mid 1's.       Interval Hx:     No hospital admissions.       ROS:  Patient notes he has been off Losartan x 1 month and home blood pressures 135-145/78-85. Currently with URI. Taking intermit Sudafed. No fever. Denies dyspnea,  CP, abdominal pain. Voiding w/o difficulty. Exercising regularly. Appetite is good.         Active Medical Problems:      ESLD 2/2 cryptogenic cirrhosis s/p liver tx 3/17  HCC s/p TACE 9/16  H/O Neutropenic colitis/ischemic bowel s/p colectomy 7/17 w/takedown 1/18  Recurrent hyperkalemia  Recurrent EJ  CKD3  HTN  GERD  Depression  CTS  HLD  RONNIE  Fibromyalgia  OA  Anemia  T2DM  Malnutrition  Metabolic Acidosis  Hypomagnesemia      Personal Hx:   , lives with wife/daughter/dog. Former smoker,   by profession. Exercising regularly.    Family Hx:   Family History   Problem Relation Age of Onset     Diabetes Father      Hypertension Father      Substance Abuse Father       Arthritis Mother      Thyroid Cancer Mother         Survivor!     Cervical Cancer Maternal Grandmother      Cerebrovascular Disease Maternal Grandfather      No Known Problems Paternal Grandmother      Prostate Cancer Paternal Grandfather      Colon Cancer No family hx of      Hyperlipidemia No family hx of      Coronary Artery Disease No family hx of      Breast Cancer No family hx of        Allergies:  Allergies   Allergen Reactions     Erythromycin GI Disturbance     Vioxx      Nausea, vomiting       Medications:  Current Outpatient Medications   Medication Sig     amLODIPine (NORVASC) 10 MG tablet Take 1 tablet (10 mg) by mouth daily     cholecalciferol (VITAMIN D3) 1000 UNIT tablet Take 1 tablet (1,000 Units) by mouth daily (Patient taking differently: Take 1,000 Units by mouth every morning )     cyclobenzaprine (FLEXERIL) 10 MG tablet Take 1 tablet (10 mg) by mouth 3 times daily as needed for muscle spasms     fluticasone (FLONASE) 50 MCG/ACT nasal spray Spray 1 spray into both nostrils daily     furosemide (LASIX) 40 MG tablet Take 1 tablet (40 mg) by mouth 2 times daily     gabapentin (NEURONTIN) 300 MG capsule Take 1 capsule (300 mg) by mouth 3 times daily     Glucose Blood (BLOOD GLUCOSE TEST STRIPS) STRP 1 strip by Lancet route 4 times daily     insulin glargine (LANTUS SOLOSTAR PEN) 100 UNIT/ML pen Inject 52 Units Subcutaneous every morning     Insulin Lispro (HUMALOG KWIKPEN) 200 UNIT/ML soln Use one unit per 3 grams carbohydrates for all meals and snacks. Total daily dose up to 80 U.     insulin pen needle (BD ENE U/F) 32G X 4 MM miscellaneous Use 1 pen needle 4 times daily or as directed.     metoprolol succinate ER (TOPROL-XL) 100 MG 24 hr tablet Take 1 tablet (100 mg) by mouth daily Take a total of 150 mg daily     metoprolol succinate ER (TOPROL-XL) 50 MG 24 hr tablet Take 1 tablet (50 mg) by mouth daily Take a total of 150 mg daily     multivitamin, therapeutic with minerals (MULTI-VITAMIN) TABS  tablet Take 1 tablet by mouth every morning     mycophenolic acid (GENERIC EQUIVALENT) 360 MG EC tablet Take 2 tablets (720 mg) by mouth every 12 hours     omeprazole (PRILOSEC) 20 MG DR capsule Take 1 capsule (20 mg) by mouth daily     oxyCODONE (ROXICODONE) 5 MG tablet Take 1 tablet (5 mg) by mouth every 4 hours as needed for pain maximum 6 tablet(s) per day     patiromer (VELTASSA) 8.4 g packet Take 16.8 g by mouth daily     predniSONE (DELTASONE) 2.5 MG tablet Take 2.5 mg by mouth daily.     tacrolimus (GENERIC EQUIVALENT) 1 MG capsule Take 5 capsules (5 mg) by mouth 2 times daily     ursodiol 500 MG TABS Take 500 mg by mouth 2 times daily     CIALIS 5 MG tablet Take 5 mg by mouth as needed      No current facility-administered medications for this visit.       Vitals:  /79   Pulse 88   Temp 99.5  F (37.5  C)   SpO2 98%     Exam:    Gen: Well groomed, pleasant  CARDIAC: RRR  LUNGS: CTA  ABDOMEN: soft, NT  EXT: No edema  NEURO: Alert, oriented.    LABS:   CMP  Recent Labs   Lab Test 03/27/19  1223 03/06/19  1156 02/13/19  1156 01/23/19  1230    139 139 138   POTASSIUM 5.9* 5.2 5.8* 5.4*   CHLORIDE 114* 111* 110* 109   CO2 25 24 22 23   ANIONGAP 3 4 7 6   * 187* 74 113*   BUN 46* 47* 57* 55*   CR 1.43* 1.33* 1.54* 1.66*   GFRESTIMATED 55* 60* 50* 46*   GFRESTBLACK 64 70 58* 53*   JACOB 9.1 8.5 9.2 8.7     Recent Labs   Lab Test 03/27/19  1223 03/06/19  1156 02/13/19  1156 01/23/19  1230   BILITOTAL 0.6 0.8 0.9 0.8   ALKPHOS 270* 292* 293* 277*   ALT 48 55 55 52   AST 29 34 43 35     CBC  Recent Labs   Lab Test 03/27/19  1223 03/06/19  1156 02/13/19  1156 01/23/19  1230   HGB 12.0* 11.5* 12.0* 11.1*   WBC 4.1 4.5 4.9 4.2   RBC 3.91* 3.71* 3.79* 3.61*   HCT 36.7* 35.3* 35.6* 33.8*   MCV 94 95 94 94   MCH 30.7 31.0 31.7 30.7   MCHC 32.7 32.6 33.7 32.8   RDW 14.1 13.8 13.7 13.7   PLT 83* 79* 90* 79*     URINE STUDIES  Recent Labs   Lab Test 03/27/19  1155 04/11/18  1446 02/26/18  1115  01/29/18  1235   COLOR Yellow Yellow Yellow Yellow   APPEARANCE Clear Slightly Cloudy Clear Clear   URINEGLC 70* >499* >499* 150*   URINEBILI Negative Negative Negative Negative   URINEKETONE Negative Negative Negative Negative   SG 1.014 1.010 1.010 1.013   UBLD Negative Small* Negative Small*   URINEPH 5.0 5.0 5.0 6.0   PROTEIN 30* 100* 100* >499*   NITRITE Negative Negative Negative Negative   LEUKEST Negative Negative Negative Negative   RBCU 1 3* 2 4*   WBCU 0 <1 1 4*     Recent Labs   Lab Test 03/27/19  1155 11/27/18  1110 09/18/18  1200 08/17/18  1050   UTPG 0.41* 0.31* 0.28* 0.51*     PTH  Recent Labs   Lab Test 10/10/18  1059 09/18/18  1212 09/14/17  1133   PTHI 22 28 34     IRON STUDIES  Recent Labs   Lab Test 07/10/18  1022 03/06/18  1029 02/05/18  1108   IRON 166 143 44   * 228* 166*   IRONSAT 73* 63* 26   NELY 1,338* 1,362* 1,014*       Cinda Cazares, NP

## 2019-04-05 ENCOUNTER — DOCUMENTATION ONLY (OUTPATIENT)
Dept: TRANSPLANT | Facility: CLINIC | Age: 54
End: 2019-04-05

## 2019-04-05 ENCOUNTER — HOSPITAL ENCOUNTER (OUTPATIENT)
Dept: LAB | Facility: CLINIC | Age: 54
Discharge: HOME | End: 2019-04-05
Payer: COMMERCIAL

## 2019-04-05 DIAGNOSIS — E87.5 HYPERKALEMIA: ICD-10-CM

## 2019-04-05 LAB
ALBUMIN SERPL-MCNC: 3.6 G/DL (ref 3.4–5)
ANION GAP SERPL CALCULATED.3IONS-SCNC: 5 MMOL/L (ref 3–14)
BUN SERPL-MCNC: 49 MG/DL (ref 7–30)
CALCIUM SERPL-MCNC: 9 MG/DL (ref 8.5–10.1)
CHLORIDE SERPL-SCNC: 108 MMOL/L (ref 94–109)
CO2 SERPL-SCNC: 25 MMOL/L (ref 20–32)
CREAT SERPL-MCNC: 1.47 MG/DL (ref 0.66–1.25)
GFR SERPL CREATININE-BSD FRML MDRD: 53 ML/MIN/{1.73_M2}
GLUCOSE SERPL-MCNC: 146 MG/DL (ref 70–99)
PHOSPHATE SERPL-MCNC: 2.9 MG/DL (ref 2.5–4.5)
POTASSIUM SERPL-SCNC: 5 MMOL/L (ref 3.4–5.3)
SODIUM SERPL-SCNC: 138 MMOL/L (ref 133–144)

## 2019-04-05 PROCEDURE — 80069 RENAL FUNCTION PANEL: CPT

## 2019-04-05 PROCEDURE — 36415 COLL VENOUS BLD VENIPUNCTURE: CPT

## 2019-04-05 NOTE — PROGRESS NOTES
Annual chart review completed.    Pt is doing labs at regular advised interval:       Pt has been seen at [facility] within the past year:  Yes, has scheduled return appt with  6/05/2019.

## 2019-04-06 NOTE — PROGRESS NOTES
Immunosuppression Note:    Camacho Bhagat is a 51 year old male who is seen today  for immunosuppression management     I, Jovan Tran MD, I have examined the patient with the resident/PA/Fellow, discussed and agree with the note and findings.  I have reviewed today's vital signs, medications, labs and imaging. I reviewed the immunosuppression medications and levels. 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 Concern for CMV colitis with low count CMV viremia/GI PTLD and subsequently underwent colonoscopy on 7/21, random biopsies obtained.  On 7/25/2017 patient had respiratory distress, abdominal compartment syndrome and EJ with oliguria. Broad spectrum antibiotics were started.  He was intubated and had a paracentesis with 5L removed. He did not required pressors.  CT was obtained which showed a new large heterogeneous right sided retroperitoneal gas and air tracking along duodenum.  No contrast extravasation.  No intraperitoneal air noted. Colorectal surgery was consulted. Initial conservative management with bowel rest and IV abx. Pt continued to spike fevers despite IV abx.  Now s/p Exploratory laparotomy, right angelique-colectomy, end ileostomy, mucus fistula, partial omentectomy on 7/26.    Graft Function: Good function. LFTs acceptable. US liver unremarkable.  Immunosuppression Management:   CellCept discontinued (6/27/17) due to neutropenia.   Prograf goal ~8 due to single IS. Level < 3. Changed to pills 8/7  Pt given meal joss to eat family at bedside   (concern about absorption of suspension) and added fluconazole booster.  Cardiorespiratory: Stable respiratory status, NLB on RA  Hematology: Pancytopenia on admission, acute on chronic, secondary to medications (MMF, bactrim, valcyte). Improved with holding medications and neupogen. 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  Continue to hold MMF and bactrim.   -Anemia- Hemoglobin stable. WBC stable. Last blood transfusion on 7/26.   GI:   -Neutropenic colitis on admission. Colonoscopy 7/21. Biopsy: resolving injury secondary to possible drug induced vs infectious.   -Bowel perforation: CT scan abd/pelvis, po contrast, showed a new large heterogeneous right sided retroperitoneal gas and air tracking along duodenum.  No contrast extravasation.  No intraperitoneal air noted. Colorectal surgery consulted.  Continued to spike high grade temperature despite IV antibiotics therefore patient taken to OR. s/p Exploratory laparotomy, right angelique-colectomy, end ileostomy, mucus fistula, partial omentectomy on 7/26. Findings no neida perforation, phlegmon/inflammationright colon. Colon pathology suggested colon perforation, negative for malignancy; CMV stain positive.  WOCN consult for ostomy care.   -Diet:  Dysphagia 3 diet. TF at goal.   -High output  ileostomy: Goal ileostomy output ~ 1000 cc in 24 hrs.   Loperamide 2mg q6H, fiber TID, Lomotil BID 5 mg.  Fluid/Electrolytes/Renal:   -Dehydration:  Appears dehydrated on labs and HR increased.  Output greater than reflected on I&O due to incontinence.  Bolus today.  -EJ: on admission, likely sec to high intraabdominal pressures and ischemic ATN sec to sepsis. Now improved  -Hypernatremia: resolved with free water.    -Hyperkalemia: on florinef, possibly exacerbated by dehydration.  Recheck this afternoon.  Endocrine: DM type 2. Endocrine consulting for glycemic management.  On lantus/NPH.  BG trending up, concerning for  infection.  Infectious disease:  ID following.  Tmax 100.7F, now 98.2F.  WBC 9.4, increased.  -Fever:  Check  CXR, UA, CRP, procalcitonin.  Gas containing retroperitoneal area noted on 8/4 CT un-drainable per IR.  Re-check CT A/P in AM.  No empiric abx unless obviously septic or toxic per ID.  -CMV viremia: Primary CMV infection.  CMV colitis per pathology, peak serum 7/15 4225 IU/ml.  Continue IV ganciclovir. Follow CMV PCR weekly, negative as of 8/3.  -EBV viremia:  Peak serum 406,697 copies/ml on 7/18.  Down to 6864 as of 8/1.  Check weekly.  -R/o SBP or abscess:  8/5 paracentesis with 250ml removed.  , cx negative.    Resolved issues:  -Severe sepsis: On admission, secondary to right colon phlegmon. Meropenem/daptomycin/micafungin tx x 10 days completed on 8/3. S/p right angelique-colectomy.  Path: CMV stain +, perforation of colon noted.  7/5 Fluid culture + staph capitis broth only (contamination).   Neuro: Toxic metabolic encephalopathy secondary to infection, prolonged hospital stay, significant improvement although somewhat disoriented at times.  Vascular:  Some evidence of microvascular injury in digits (toes) this is most likely due to injury while patient was on pressor therapy with sepsis.  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   Activity: Deconditioned due to prolong hospital course. Daily PT/OT, encourage pt to be OOB to chair. Encourage pulmonary toilet.   Prophylaxis: DVT-Heparin SQ  Disposition: 7A.       Medical Decision Making: Medium    PILLO/Fellow/Resident Provider:  Evette Pennington NP [id number]    Faculty: Jovan Tran MD    __________________________________________________________________  Transplant History:.  3/4/2017 DD OLT with Dr. Tran (Liver), Postoperative day: 157     Interval History:   Overnight events:  Disoriented with + cough this morning.  Pt moans in pain when any area of body is touched.    ROS:   A 10-point review of systems was negative  except as noted above.    Meds:    insulin isophane human  13 Units Subcutaneous QAM     sodium chloride 0.9%  1,000 mL Intravenous Once     diphenoxylate-atropine  5 mL Oral BID     fluconazole  100 mg Oral Daily     insulin glargine  24 Units Subcutaneous Q24H     tacrolimus  6 mg Oral BID IS     fludrocortisone  0.1 mg Oral Daily     sodium chloride (PF)  3 mL Intracatheter Q8H     ganciclovir (CYTOVENE) intermittent infusion  7.5 mg/kg Intravenous Q12H     sulfamethoxazole-trimethoprim  1 tablet Oral Daily     loperamide  4 mg Oral or Feeding Tube 4x Daily     insulin aspart  1-12 Units Subcutaneous Q4H     fiber modular  1 packet Per Feeding Tube TID     insulin aspart   Subcutaneous TID w/meals     protein modular  1 packet Per Feeding Tube TID     aspirin  80 mg Oral Daily     multivitamins with minerals  15 mL Per Feeding Tube Daily     heparin  5,000 Units Subcutaneous Q8H     pantoprazole  40 mg Oral or Feeding Tube Daily     sodium chloride (PF)  3 mL Intracatheter Q8H       Physical Exam:     Admit Weight: 94.2 kg (207 lb 11.2 oz) (SCALE 2)    Current vitals:   /75 (BP Location: Right arm)  Pulse 104  Temp 98.2  F (36.8  C) (Oral)  Resp 20  Ht 1.829 m (6')  Wt 92.6 kg (204 lb 1.6 oz)  SpO2 96%  BMI 27.68 kg/m2    Vital sign ranges:    Temp:  [98.2  F (36.8  C)-100.7  F (38.2  C)] 98.2  F (36.8  C)  Pulse:  [104-119] 104  Heart Rate:  [] 94  Resp:  [20-25] 20  BP: (121-169)/(69-96) 151/75  SpO2:  [93 %-100 %] 96 %  Patient Vitals for the past 24 hrs:   BP Temp Temp src Pulse Heart Rate Resp SpO2   08/08/17 1123 151/75 98.2  F (36.8  C) Oral - 94 20 96 %   08/08/17 0749 161/82 100.7  F (38.2  C) Oral - 102 20 100 %   08/08/17 0415 156/83 99.9  F (37.7  C) Oral 104 101 20 97 %   08/08/17 0000 153/82 - - - 104 - -   08/07/17 2300 - - - - 107 - 97 %   08/07/17 2242 149/84 100.2  F (37.9  C) Axillary 110 - 22 95 %   08/07/17 2205 130/69 100  F (37.8  C) Axillary 118 - 25 95 %   08/07/17 2156  122/77 - - 118 - - -   08/07/17 2152 129/85 - - 115 - 22 95 %   08/07/17 2137 121/73 - - 119 - - 94 %   08/07/17 2132 162/82 - - 110 - - 94 %   08/07/17 2119 (!) 159/96 - - 114 - - 96 %   08/07/17 2012 (!) 169/93 98.4  F (36.9  C) Oral 117 111 20 93 %   08/07/17 1642 152/86 99.5  F (37.5  C) Oral - 103 20 96 %     General Appearance: NAD  Skin: normal, warm, dry  Heart: RRR  Lungs: 96% 3L  Abdomen: soft, non distended. Ileostomy with brown output. Mucus fistula, pink. Midline incision, c/d/i.   : No vazquez.   Extremities: No edema.  Necrotic blackened areas on right 1st & 2nd toes and left 2nd toe.  Neurologic: A&O x 2-3, waxes and wanes.       Data:   CMP    Recent Labs  Lab 08/08/17  0600 08/07/17  0549  08/02/17  0543    139  < > 150*   POTASSIUM 5.8* 5.4*  < > 4.5   CHLORIDE 111* 114*  < > 123*   CO2 17* 18*  < > 21   * 99  < > 112*   BUN 55* 51*  < > 49*   CR 1.18 1.06  < > 0.91   GFRESTIMATED 65 73  < > 87   GFRESTBLACK 78 88  < > >90African American GFR Calc   JACOB 8.2* 8.2*  < > 8.0*   MAG 2.0 2.1  < > 2.0   PHOS 4.6* 4.9*  < > 3.2   ALBUMIN 2.1*  --   --  2.3*   BILITOTAL 0.5  --   --  0.4   ALKPHOS 172*  --   --  199*   AST 28  --   --  62*   ALT 25  --   --  42   < > = values in this interval not displayed.  CBC    Recent Labs  Lab 08/08/17  0600 08/07/17  0549   HGB 8.1* 7.7*   WBC 9.4 5.5   * 116*     COAGS    Recent Labs  Lab 08/05/17  0949   INR 1.19*      Urinalysis  Recent Labs   Lab Test  08/05/17   0617  07/28/17   1042   06/14/17   1508   04/11/16   1345   COLOR  Yellow  Yellow   < >   --    < >  Yellow   APPEARANCE  Slightly Cloudy  Slightly Cloudy   < >   --    < >  Clear   URINEGLC  Negative  Negative   < >   --    < >  30*   URINEBILI  Negative  Negative   < >   --    < >  Negative   URINEKETONE  Negative  Negative   < >   --    < >  Negative   SG  1.018  1.012   < >   --    < >  1.016   UBLD  Negative  Trace*   < >   --    < >  Small*   URINEPH  5.0  5.0   < >   --    < >  " 5.0   PROTEIN  30*  30*   < >   --    < >  30*   NITRITE  Negative  Negative   < >   --    < >  Negative   LEUKEST  Negative  Negative   < >   --    < >  Negative   RBCU  0  5*   < >   --    < >  1   WBCU  5*  6*   < >   --    < >  1   UTPG   --    --    --   1.55*   --   0.41*    < > = values in this interval not displayed.          7/21/2017   SPECIMEN(S):   A: Colon biopsy, ascending   B: Colon biopsy, descending     FINAL DIAGNOSIS:   A. COLON BIOPSY, ASCENDING:   - Colonic mucosa with no significant histologic abnormality   - Negative for intraepithelial lymphocytosis or deposition of   subepithelial thick collagenous band     B. COLON BIOPSY, DESCENDING:   - Crypt architecture distortion, consistent with healed prior injury   - Negative for active inflammation or dysplasia   - Negative for intraepithelial lymphocytosis or deposition of   subepithelial thick collagenous band   - Please, see comment     COMMENT:   Specimen B, \"descending colon\" features lamina propria edema, slight   variation in crypt sizes and shapes and occasional crypt drop-out.  This   may indicate a resolving injury which could be drug-induced or   infectious.     CT scan 7/25/2017:   IMPRESSION:   1. New large heterogeneous area of right-sided retroperitoneal gas  which is highly concerning for an infectious process. Given the  history of prior neutropenic colitis and biopsies, there could also be  a component of stool from a ruptured viscus, despite the lack of  surrounding bowel loops and no extraluminal oral contrast. Surgical  consultation is recommended.      2. Bibasilar atelectasis versus consolidation with small bilateral  pleural effusions.     3. Extensive mesenteric and soft tissue edema with large volume  ascites. Numerous mesenteric and retroperitoneal lymph nodes which are  presumably reactive.     4. Postsurgical changes of liver transplant.     [Urgent Result: Retroperitoneal gas]     Finding was identified on 7/25/2017 " 5:34 PM.      Dr. Santoyo was contacted by Dr. Atkins at 7/25/2017 5:37 PM and  verbalized understanding of the urgent finding.      I have personally reviewed the examination and initial interpretation  and I agree with the findings.     LUCI HOROWITZ, [facility]

## 2019-04-11 ENCOUNTER — CARE COORDINATION (OUTPATIENT)
Dept: NEPHROLOGY | Facility: CLINIC | Age: 54
End: 2019-04-11

## 2019-04-15 NOTE — PROGRESS NOTES
Patient left a voicemail stating he didn't get the new dose of metoprolol until 4/10. Since then, BP has been around 130-135 / 80-85. Requested a call next week for an update.    Brenda Falcon RN

## 2019-04-22 NOTE — PROGRESS NOTES
Left message on patient's voicemail to call us back at [phone] when he had a chance. Checking in with blood pressure.    Will await patient's call    Valery Clark RN, BSN  Nephrology Care Coordinator  [facility]  [email or web address]  [phone]     Statement Selected

## 2019-04-25 ENCOUNTER — HOSPITAL ENCOUNTER (OUTPATIENT)
Dept: LAB | Facility: CLINIC | Age: 54
Discharge: HOME | End: 2019-04-25
Attending: INTERNAL MEDICINE | Admitting: INTERNAL MEDICINE
Payer: COMMERCIAL

## 2019-04-25 ENCOUNTER — CARE COORDINATION (OUTPATIENT)
Dept: NEPHROLOGY | Facility: CLINIC | Age: 54
End: 2019-04-25

## 2019-04-25 DIAGNOSIS — Z94.4 LIVER REPLACED BY TRANSPLANT (H): ICD-10-CM

## 2019-04-25 DIAGNOSIS — E87.5 HYPERKALEMIA: ICD-10-CM

## 2019-04-25 LAB
ALBUMIN SERPL-MCNC: 3.8 G/DL (ref 3.4–5)
ALP SERPL-CCNC: 268 U/L (ref 40–150)
ALT SERPL W P-5'-P-CCNC: 34 U/L (ref 0–70)
ANION GAP SERPL CALCULATED.3IONS-SCNC: 3 MMOL/L (ref 3–14)
AST SERPL W P-5'-P-CCNC: 18 U/L (ref 0–45)
BILIRUB DIRECT SERPL-MCNC: 0.3 MG/DL (ref 0–0.2)
BILIRUB SERPL-MCNC: 0.7 MG/DL (ref 0.2–1.3)
BUN SERPL-MCNC: 42 MG/DL (ref 7–30)
CALCIUM SERPL-MCNC: 8.8 MG/DL (ref 8.5–10.1)
CHLORIDE SERPL-SCNC: 112 MMOL/L (ref 94–109)
CHOLEST SERPL-MCNC: 134 MG/DL
CO2 SERPL-SCNC: 24 MMOL/L (ref 20–32)
CREAT SERPL-MCNC: 1.39 MG/DL (ref 0.66–1.25)
ERYTHROCYTE [DISTWIDTH] IN BLOOD BY AUTOMATED COUNT: 14.2 % (ref 10–15)
GFR SERPL CREATININE-BSD FRML MDRD: 57 ML/MIN/{1.73_M2}
GLUCOSE SERPL-MCNC: 191 MG/DL (ref 70–99)
HCT VFR BLD AUTO: 37.2 % (ref 40–53)
HDLC SERPL-MCNC: 46 MG/DL
HGB BLD-MCNC: 12.3 G/DL (ref 13.3–17.7)
LDLC SERPL CALC-MCNC: 48 MG/DL
MCH RBC QN AUTO: 31.2 PG (ref 26.5–33)
MCHC RBC AUTO-ENTMCNC: 33.1 G/DL (ref 31.5–36.5)
MCV RBC AUTO: 94 FL (ref 78–100)
NONHDLC SERPL-MCNC: 88 MG/DL
PHOSPHATE SERPL-MCNC: 2.5 MG/DL (ref 2.5–4.5)
PLATELET # BLD AUTO: 82 10E9/L (ref 150–450)
POTASSIUM SERPL-SCNC: 5.1 MMOL/L (ref 3.4–5.3)
PROT SERPL-MCNC: 7.2 G/DL (ref 6.8–8.8)
RBC # BLD AUTO: 3.94 10E12/L (ref 4.4–5.9)
SODIUM SERPL-SCNC: 139 MMOL/L (ref 133–144)
TACROLIMUS BLD-MCNC: 9.3 UG/L (ref 5–15)
TME LAST DOSE: NORMAL H
TRIGL SERPL-MCNC: 198 MG/DL
WBC # BLD AUTO: 5.1 10E9/L (ref 4–11)

## 2019-04-25 PROCEDURE — 84075 ASSAY ALKALINE PHOSPHATASE: CPT

## 2019-04-25 PROCEDURE — 82248 BILIRUBIN DIRECT: CPT

## 2019-04-25 PROCEDURE — 84450 TRANSFERASE (AST) (SGOT): CPT

## 2019-04-25 PROCEDURE — 80061 LIPID PANEL: CPT

## 2019-04-25 PROCEDURE — 36415 COLL VENOUS BLD VENIPUNCTURE: CPT | Performed by: INTERNAL MEDICINE

## 2019-04-25 PROCEDURE — 80197 ASSAY OF TACROLIMUS: CPT | Performed by: INTERNAL MEDICINE

## 2019-04-25 PROCEDURE — 85027 COMPLETE CBC AUTOMATED: CPT

## 2019-04-25 PROCEDURE — 84155 ASSAY OF PROTEIN SERUM: CPT

## 2019-04-25 PROCEDURE — 82247 BILIRUBIN TOTAL: CPT

## 2019-04-25 PROCEDURE — 36415 COLL VENOUS BLD VENIPUNCTURE: CPT

## 2019-04-25 PROCEDURE — 84460 ALANINE AMINO (ALT) (SGPT): CPT

## 2019-04-25 PROCEDURE — 80069 RENAL FUNCTION PANEL: CPT

## 2019-04-25 NOTE — PROGRESS NOTES
Nephrology Note: Nursing Outreach Encounter    REASON FOR CALL:                                                      REASON FOR CALL: Blood Pressure Follow Up                                          SITUATION/BACKROUND:                                                    Patient is being treated for HTN.      ASSESSMENT:                                                      Spoke with patient. His BP has gone up and heart rate has come down since increasing metoprolol. BP averaging 150/80-90 with pulse in the 50-60 range (previously 60-70's). He denied any physical symptoms.    Currently taking metoprolol 150mg daily, amlodipine 10mg daily, and lasix 40mg BID.    Uremic Symptoms: No       PLAN:                                                      Follow Up:   Route to provider to further advise     Per Jovanna, no changes at this time. Follow up in 1 week.    Patient verbalized understanding and will contact the clinic with any further questions or concerns.     Brenda Falcon RN

## 2019-04-26 ENCOUNTER — TELEPHONE (OUTPATIENT)
Dept: TRANSPLANT | Facility: CLINIC | Age: 54
End: 2019-04-26

## 2019-04-26 DIAGNOSIS — Z94.4 LIVER TRANSPLANT RECIPIENT (H): ICD-10-CM

## 2019-04-26 RX ORDER — TACROLIMUS 1 MG/1
4 CAPSULE ORAL EVERY 12 HOURS
Qty: 240 CAPSULE | Refills: 11 | Status: SHIPPED | OUTPATIENT
Start: 2019-04-26 | End: 2020-03-19

## 2019-04-26 NOTE — TELEPHONE ENCOUNTER
Please call Camacho about tacrolimus level of 9.3, too high.  Verify current level and decrease dose to 4mg in the am,  4 mg in the pm.

## 2019-04-26 NOTE — TELEPHONE ENCOUNTER
Left a detailed message to reduce tacro dose to 4mg am, 4mg pm and requested a confirmation call back. Pt calls back.

## 2019-04-26 NOTE — TELEPHONE ENCOUNTER
Per , plan to keep current tacrolimus dose 5mg Q 12hours.  Per , creatinine good, does not need dose change.

## 2019-04-29 NOTE — TELEPHONE ENCOUNTER
Writer left a detailed message stating that Dr. Camejo would like pt to take tacro 5mg every 12 hours.

## 2019-04-30 ENCOUNTER — MYC REFILL (OUTPATIENT)
Dept: GASTROENTEROLOGY | Facility: CLINIC | Age: 54
End: 2019-04-30

## 2019-04-30 DIAGNOSIS — Z94.4 LIVER REPLACED BY TRANSPLANT (H): ICD-10-CM

## 2019-05-01 RX ORDER — URSODIOL 500 MG/1
500 TABLET, FILM COATED ORAL 2 TIMES DAILY
Qty: 180 TABLET | Refills: 3 | Status: SHIPPED | OUTPATIENT
Start: 2019-05-01 | End: 2019-11-06

## 2019-05-10 ENCOUNTER — HOSPITAL ENCOUNTER (OUTPATIENT)
Dept: LAB | Facility: CLINIC | Age: 54
Discharge: HOME | End: 2019-05-10
Payer: COMMERCIAL

## 2019-05-10 DIAGNOSIS — E87.5 HYPERKALEMIA: ICD-10-CM

## 2019-05-10 DIAGNOSIS — Z94.4 LIVER REPLACED BY TRANSPLANT (H): ICD-10-CM

## 2019-05-10 LAB
ALBUMIN SERPL-MCNC: 3.8 G/DL (ref 3.4–5)
ALP SERPL-CCNC: 270 U/L (ref 40–150)
ALT SERPL W P-5'-P-CCNC: 37 U/L (ref 0–70)
ANION GAP SERPL CALCULATED.3IONS-SCNC: 6 MMOL/L (ref 3–14)
AST SERPL W P-5'-P-CCNC: 25 U/L (ref 0–45)
BILIRUB DIRECT SERPL-MCNC: 0.3 MG/DL (ref 0–0.2)
BILIRUB SERPL-MCNC: 0.8 MG/DL (ref 0.2–1.3)
BUN SERPL-MCNC: 54 MG/DL (ref 7–30)
CALCIUM SERPL-MCNC: 8.8 MG/DL (ref 8.5–10.1)
CHLORIDE SERPL-SCNC: 108 MMOL/L (ref 94–109)
CO2 SERPL-SCNC: 24 MMOL/L (ref 20–32)
CREAT SERPL-MCNC: 1.6 MG/DL (ref 0.66–1.25)
ERYTHROCYTE [DISTWIDTH] IN BLOOD BY AUTOMATED COUNT: 13.9 % (ref 10–15)
GFR SERPL CREATININE-BSD FRML MDRD: 48 ML/MIN/{1.73_M2}
GLUCOSE SERPL-MCNC: 103 MG/DL (ref 70–99)
HCT VFR BLD AUTO: 38.2 % (ref 40–53)
HGB BLD-MCNC: 12.4 G/DL (ref 13.3–17.7)
MCH RBC QN AUTO: 31.6 PG (ref 26.5–33)
MCHC RBC AUTO-ENTMCNC: 32.5 G/DL (ref 31.5–36.5)
MCV RBC AUTO: 97 FL (ref 78–100)
PHOSPHATE SERPL-MCNC: 3.3 MG/DL (ref 2.5–4.5)
PLATELET # BLD AUTO: 97 10E9/L (ref 150–450)
POTASSIUM SERPL-SCNC: 5 MMOL/L (ref 3.4–5.3)
PROT SERPL-MCNC: 7.1 G/DL (ref 6.8–8.8)
RBC # BLD AUTO: 3.93 10E12/L (ref 4.4–5.9)
SODIUM SERPL-SCNC: 138 MMOL/L (ref 133–144)
TACROLIMUS BLD-MCNC: 6.6 UG/L (ref 5–15)
TME LAST DOSE: NORMAL H
WBC # BLD AUTO: 5.5 10E9/L (ref 4–11)

## 2019-05-10 PROCEDURE — 80069 RENAL FUNCTION PANEL: CPT

## 2019-05-10 PROCEDURE — 80197 ASSAY OF TACROLIMUS: CPT | Performed by: INTERNAL MEDICINE

## 2019-05-10 PROCEDURE — 82248 BILIRUBIN DIRECT: CPT

## 2019-05-10 PROCEDURE — 36415 COLL VENOUS BLD VENIPUNCTURE: CPT | Performed by: INTERNAL MEDICINE

## 2019-05-10 PROCEDURE — 85027 COMPLETE CBC AUTOMATED: CPT

## 2019-05-10 PROCEDURE — 84450 TRANSFERASE (AST) (SGOT): CPT

## 2019-05-10 PROCEDURE — 84155 ASSAY OF PROTEIN SERUM: CPT

## 2019-05-10 PROCEDURE — 84460 ALANINE AMINO (ALT) (SGPT): CPT

## 2019-05-10 PROCEDURE — 84075 ASSAY ALKALINE PHOSPHATASE: CPT

## 2019-05-10 PROCEDURE — 82247 BILIRUBIN TOTAL: CPT

## 2019-05-10 ASSESSMENT — ENCOUNTER SYMPTOMS
NECK PAIN: 1
MUSCLE CRAMPS: 1
MYALGIAS: 0
BACK PAIN: 1
JOINT SWELLING: 1
ARTHRALGIAS: 1
STIFFNESS: 1

## 2019-05-13 ENCOUNTER — OFFICE VISIT (OUTPATIENT)
Dept: ENDOCRINOLOGY | Facility: CLINIC | Age: 54
End: 2019-05-13
Payer: COMMERCIAL

## 2019-05-13 VITALS
SYSTOLIC BLOOD PRESSURE: 150 MMHG | BODY MASS INDEX: 31.06 KG/M2 | WEIGHT: 229.3 LBS | HEIGHT: 72 IN | HEART RATE: 74 BPM | DIASTOLIC BLOOD PRESSURE: 90 MMHG

## 2019-05-13 DIAGNOSIS — E11.42 TYPE 2 DIABETES MELLITUS WITH DIABETIC POLYNEUROPATHY, WITH LONG-TERM CURRENT USE OF INSULIN (H): Primary | ICD-10-CM

## 2019-05-13 DIAGNOSIS — I15.1 HYPERTENSION SECONDARY TO OTHER RENAL DISORDERS: ICD-10-CM

## 2019-05-13 DIAGNOSIS — Z79.4 TYPE 2 DIABETES MELLITUS WITH DIABETIC POLYNEUROPATHY, WITH LONG-TERM CURRENT USE OF INSULIN (H): Primary | ICD-10-CM

## 2019-05-13 RX ORDER — GLUCOSAMINE HCL/CHONDROITIN SU 500-400 MG
1 CAPSULE ORAL 3 TIMES DAILY
Qty: 300 EACH | Refills: 3 | Status: SHIPPED | OUTPATIENT
Start: 2019-05-13 | End: 2020-05-11

## 2019-05-13 ASSESSMENT — PAIN SCALES - GENERAL: PAINLEVEL: NO PAIN (0)

## 2019-05-13 ASSESSMENT — MIFFLIN-ST. JEOR: SCORE: 1918.23

## 2019-05-13 NOTE — PATIENT INSTRUCTIONS
Increase the dose of Lantus to 55 U   For a period of 4 days, in addition to checking the pre breakfast and pre dinner BG, also check the bedtime BG and contact me with the numbers

## 2019-05-13 NOTE — LETTER
5/13/2019     RE: Camacho Bhagat  [address]  [facility] [address]     Dear Colleague,    Thank you for referring your patient, Camacho Bhagat, to the [facility] ENDOCRINOLOGY at [facility]. Please see a copy of my visit note below.    Endocrine Clinic Follow-up Note  Date: May 13, 2019     HPI: Camacho Bhagat is a 53 year old man w/ PMx of CASTAÑEDA/HCC s/p liver transplant (3/2017), fibromyalgia, DVT, HTN, RONNIE, OA, CVA (2012) who presents for follow-up of his DM-II.    History of Diabetes  Type 2   This was diagnosed in 2002.  Oral meds first, later placed on insulin for the last 7 years.     Related history: h/o CASTAÑEDA cirrhosis +/- work exposures, and HCC s/p liver transplant 3/2017. Course complicated by acute abdomen/neutropenic fever requiring right hemicolectomy and colostomy Fall 2017. He later underwent colostomy takedown 1/5/18 which was complicated by abdominal wall abscess at a drain site. During this course, he has also developed excessive proteinuria which is followed by nephrology.     Current DM Regimen:  52 units of Lantus in the morning.  1 unit NovoLog per 3 g carbohydrates for all meals and snacks.  Correction dose of 1 unit per 25 mg above 150 during daytime and above 200 at bedtime.    The glucometer readings revealed that he checks his blood glucose 2 times daily, before breakfast and before dinner. Cannot test more due to cost of testing strips. Inconsistent mealtimes. The average blood glucose over the last month is 175 with a standard deviation of 70 and a range variable between 48 and 320. Reports 4 hypoglycemic episodes in past 6 mo. Two episodes documented by the meter (48, 66).  Both were at night >2 hours post-meal. Starts to feel hypoglycemic symptoms when BG <70. Experiences tremors when BG are in the 30s or 40s.  Has used an johan (One Touch Verio)  To help document the carbohydrate intake and the NovoLog dose administered for meals. He generally  uses 26-38 units at meals at a coverage scale of 1 unit:3grams CHO. A1c today was again 6.1, in the context of anemia.    Related meds:  Prednisone started for transplant and the dose remains 2.5 mg per day.  He continues to take tacrolimus and myfortic acid. He stopped his losartan in conjunction w/ his renal NP due to hyperkalemia. BP at home have been 130s-150s/70-80s.     Diet: 2 meals per day  Breakfast: varies; eats egg whites, turkey billy/sausage, english muffins, oatmeal, cream of wheat, or cereal with orange juice  Lunch: skips   Dinner: often cooks at home; grille chicken, fish, beef, lean pork, veggies  Snacks: rarely; sometimes beef jerky   Alcohol or sugared beverages: orange juice w/ breakfast as above (6-8oz), occasional latte     Exercise   Walks dog BID, around 1 mile/walk  Has not lifted weights in 3 weeks  Swims 3 times a week, but has not swam much recently     Complications  + chronic complications.     1. Retinopathy: No known eye disease  Last eye exam was 8/2018.  H/o DR. He has cataracts.      2.  Nephropathy: yes. He has significant proteinuria and f/up with nephrology.  Recent GFR in the 50s. He was started on losartan on 4/4/18 and the dose was increased to 50 mg daily. Discontinued losartan around 3/2019 due to hyperkalemia.    Albumin Urine mg/g Cr   Date Value Ref Range Status   10/27/2017 132.46 (H) 0 - 17 mg/g Cr Final       3. Neuropathy - left foot - pins/needles sensation.     5. Macrovascular: none     6. Lipids: not on statin , last LDL in 30s      Current Outpatient Medications:      amLODIPine (NORVASC) 10 MG tablet, Take 1 tablet (10 mg) by mouth daily, Disp: 90 tablet, Rfl: 3     cholecalciferol (VITAMIN D3) 1000 UNIT tablet, Take 1 tablet (1,000 Units) by mouth daily (Patient taking differently: Take 1,000 Units by mouth every morning ), Disp: 100 tablet, Rfl: 3     CIALIS 5 MG tablet, Take 5 mg by mouth as needed , Disp: , Rfl: 1     cyclobenzaprine (FLEXERIL) 10 MG  tablet, Take 1 tablet (10 mg) by mouth 3 times daily as needed for muscle spasms, Disp: 90 tablet, Rfl: 3     fluticasone (FLONASE) 50 MCG/ACT nasal spray, Spray 1 spray into both nostrils daily, Disp: 15 mL, Rfl: 1     furosemide (LASIX) 40 MG tablet, Take 1 tablet (40 mg) by mouth 2 times daily, Disp: 180 tablet, Rfl: 3     gabapentin (NEURONTIN) 300 MG capsule, Take 1 capsule (300 mg) by mouth 3 times daily, Disp: 270 capsule, Rfl: 3     Glucose Blood (BLOOD GLUCOSE TEST STRIPS) STRP, 1 strip by Lancet route 3 times daily, Disp: 300 each, Rfl: 3     insulin glargine (LANTUS SOLOSTAR PEN) 100 UNIT/ML pen, Inject 52 Units Subcutaneous every morning, Disp: 48 mL, Rfl: 3     Insulin Lispro (HUMALOG KWIKPEN) 200 UNIT/ML soln, Use one unit per 3 grams carbohydrates for all meals and snacks. Total daily dose up to 80 U., Disp: 72 mL, Rfl: 3     insulin pen needle (BD ENE U/F) 32G X 4 MM miscellaneous, Use 1 pen needle 4 times daily or as directed., Disp: 400 each, Rfl: 3     metoprolol succinate ER (TOPROL-XL) 100 MG 24 hr tablet, Take 1 tablet (100 mg) by mouth daily Take a total of 150 mg daily, Disp: 90 tablet, Rfl: 3     metoprolol succinate ER (TOPROL-XL) 50 MG 24 hr tablet, Take 1 tablet (50 mg) by mouth daily Take a total of 150 mg daily, Disp: 90 tablet, Rfl: 3     multivitamin, therapeutic with minerals (MULTI-VITAMIN) TABS tablet, Take 1 tablet by mouth every morning, Disp: , Rfl:      mycophenolic acid (GENERIC EQUIVALENT) 360 MG EC tablet, Take 2 tablets (720 mg) by mouth every 12 hours, Disp: 360 tablet, Rfl: 3     omeprazole (PRILOSEC) 20 MG DR capsule, Take 1 capsule (20 mg) by mouth daily, Disp: 90 capsule, Rfl: 3     oxyCODONE (ROXICODONE) 5 MG tablet, Take 1 tablet (5 mg) by mouth every 4 hours as needed for pain maximum 6 tablet(s) per day, Disp: 30 tablet, Rfl: 0     patiromer (VELTASSA) 8.4 g packet, Take 16.8 g by mouth daily, Disp: 180 each, Rfl: 3     predniSONE (DELTASONE) 2.5 MG tablet, Take  2.5 mg by mouth daily., Disp: 90 tablet, Rfl: 3     tacrolimus (GENERIC EQUIVALENT) 1 MG capsule, Take 4 capsules (4 mg) by mouth every 12 hours, Disp: 240 capsule, Rfl: 11     ursodiol (ACTIGALL) 500 MG tablet, Take 1 tablet (500 mg) by mouth 2 times daily, Disp: 180 tablet, Rfl: 3      Past Medical History:   Diagnosis Date     Depressive disorder, not elsewhere classified      Diabetes type 2, controlled (H) 11/10/2016     Esophageal reflux      Fibromyalgia 1/2009    dx with Dr Benitez( Rheum)     Gangrene of finger (H) 8/25/2017     H/O deep venous thrombosis 11/2001    Permanent IVC filter in place.     H/O CASTAÑEDA (nonalcoholic steatohepatitis)      H/O Pneumonia, organism unspecified(486) 10/2001    Included ARDS, sepsis, and  acute renal failure; hospitalized, required tracheostomy placement.     H/O: HTN (hypertension) 11/2001    No longer prescribed antihypertensive medication.       History of CVA (cerebrovascular accident)      History of hepatocellular carcinoma      History of liver transplant (H)      History of obstructive sleep apnea     No longer wears CPAP since losing approximately 200 pounds with his liver transplant and its complications.       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     Osteoarthritis      Presence of PERMANENT IVC filter      Rheumatoid arthritis(714.0)    DEXA scan normal prior to transplant, in 2016    Past Surgical History:   Procedure Laterality Date     BENCH LIVER N/A 3/4/2017    Procedure: BENCH LIVER;  Surgeon: Jovan Tran MD;  Location: [facility] OR     C TOTAL KNEE ARTHROPLASTY  2008    Right knee arthroscopy     CHOLECYSTECTOMY       COLONOSCOPY N/A 7/21/2017    Procedure: COMBINED COLONOSCOPY, SINGLE OR MULTIPLE BIOPSY/POLYPECTOMY BY BIOPSY;  Colonoscopy;  Surgeon: Izaiah Montes MD;   Location: [facility]     ENDOSCOPIC RETROGRADE CHOLANGIOPANCREATOGRAM N/A 5/22/2018    Procedure: COMBINED ENDOSCOPIC RETROGRADE CHOLANGIOPANCREATOGRAPHY, PLACE TUBE/STENT;  Endoscopic Retrograde Cholangiopancreatography with sphincterotomy and pancreatic duct stent placement;  Surgeon: Zay Gaitan MD;  Location: [facility] OR     ENT SURGERY      Repair of deviated septum     ESOPHAGOSCOPY, GASTROSCOPY, DUODENOSCOPY (EGD), COMBINED N/A 8/4/2016    Procedure: COMBINED ESOPHAGOSCOPY, GASTROSCOPY, DUODENOSCOPY (EGD), BIOPSY SINGLE OR MULTIPLE;  Surgeon: Trent Pederson MD;  Location:  GI     ESOPHAGOSCOPY, GASTROSCOPY, DUODENOSCOPY (EGD), COMBINED N/A 8/27/2017    Procedure: COMBINED ESOPHAGOSCOPY, GASTROSCOPY, DUODENOSCOPY (EGD);;  Surgeon: Los Wynn MD;  Location: [facility]     INCISION AND DRAINAGE ABDOMEN WASHOUT, COMBINED Right 2/14/2018    Procedure: COMBINED INCISION AND DRAINAGE ABDOMEN WASHOUT;  COMBINED INCISION AND DRAINAGE right ABDOMEN flank;  Surgeon: Sara Dinh MD;  Location: [facility]     LAPAROTOMY EXPLORATORY N/A 7/4/2017    Procedure: LAPAROTOMY EXPLORATORY;  Exploratory Laparotomy, washout;  Surgeon: Tip Zhang MD;  [facility]: [facility]     LAPAROTOMY EXPLORATORY N/A 7/5/2017    Procedure: LAPAROTOMY EXPLORATORY;  Exploratory Laparotomy, Washout with closure.;  Surgeon: Tip Zhang MD;  [facility]: [facility]     LAPAROTOMY EXPLORATORY N/A 7/26/2017    Procedure: LAPAROTOMY EXPLORATORY;  Exploratory Laparotomy, Right angelique-colectomy, end ileostomy, mucosal fistula, partial omentectomy;  Surgeon: Sara Dinh MD;  Location: [facility]     ORTHOPEDIC SURGERY Right     Repair of dislocated wrist.       RELEASE TRIGGER FINGER Right 11/10/2017    Procedure: RELEASE TRIGGER FINGER;  Debride, V-Y Flap Right Index Finger;  Surgeon: Momo Noonan MD;  [facility]: [facility]     TAKEDOWN ILEOSTOMY N/A 1/5/2018    Procedure: TAKEDOWN ILEOSTOMY;  Exploratory  Laparotomy, Lysis of Adhesions, Takedown Of End Ileostomy, Takedown of mucocutaneous fistula, ileocolic resection  And Colorectal Anastomosis, Primary repair of Ventral Hernia, Anesthesia Block ;  Surgeon: Sara Dinh MD;  Location: [facility]     TRACHEOSTOMY      During hospitalization for pneumonia.       TRANSPLANT LIVER RECIPIENT  DONOR N/A 3/4/2017    Procedure: TRANSPLANT LIVER RECIPIENT  DONOR;  Surgeon: Jovan Tran MD;  Location: [facility] OR     Review of Systems     Constitutional: weight slightly increased, 9 lbs greater than 2018. Appetite is good   Head: no headache   Eye: no vision change/ loss of peripheral vision.   ENT: No throat congestion.   Respiratory: no cough   Cardiovascular: no chest pain or tachycardia  Gastrointestinal: Negative for vomiting, abdominal pain and diarrhea.  Genitourinary: No bladder problems.  Musculoskeletal: Negative for myalgias. No weakness.Neurological: Negative for seizures and headaches. Numbness and tingling on L foot.   Psychiatric/Behavioral: Negative dysphoric mood.    Answers for HPI/ROS submitted by the patient on 5/10/2019   General Symptoms: No  Skin Symptoms: No  HENT Symptoms: No  EYE SYMPTOMS: No  HEART SYMPTOMS: No  LUNG SYMPTOMS: No  INTESTINAL SYMPTOMS: No  URINARY SYMPTOMS: No  REPRODUCTIVE SYMPTOMS: No  SKELETAL SYMPTOMS: Yes  BLOOD SYMPTOMS: No  NERVOUS SYSTEM SYMPTOMS: No  MENTAL HEALTH SYMPTOMS: No  Back pain: Yes  Muscle aches: No  Neck pain: Yes  Swollen joints: Yes  Joint pain: Yes  Bone pain: No  Muscle cramps: Yes  Joint stiffness: Yes  Bone fracture: No      Past Medical History:   Diagnosis Date     Depressive disorder, not elsewhere classified      Diabetes type 2, controlled (H) 11/10/2016     Esophageal reflux      Fibromyalgia 2009    dx with Dr Benitez( Rheum)     Gangrene of finger (H) 2017     H/O deep venous thrombosis 2001    Permanent IVC filter in place.     H/O CASTAÑEDA (nonalcoholic  steatohepatitis)      H/O Pneumonia, organism unspecified(486) 10/2001    Included ARDS, sepsis, and  acute renal failure; hospitalized, required tracheostomy placement.     H/O: HTN (hypertension) 11/2001    No longer prescribed antihypertensive medication.       History of CVA (cerebrovascular accident)      History of hepatocellular carcinoma      History of liver transplant (H)      History of obstructive sleep apnea     No longer wears CPAP since losing approximately 200 pounds with his liver transplant and its complications.       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     Osteoarthritis      Presence of PERMANENT IVC filter      Rheumatoid arthritis(714.0)        Past Surgical History:   Procedure Laterality Date     BENCH LIVER N/A 3/4/2017    Procedure: BENCH LIVER;  Surgeon: Jovan Tran MD;  Location: [facility] TOTAL KNEE ARTHROPLASTY  2008    Right knee arthroscopy     CHOLECYSTECTOMY       COLONOSCOPY N/A 7/21/2017    Procedure: COMBINED COLONOSCOPY, SINGLE OR MULTIPLE BIOPSY/POLYPECTOMY BY BIOPSY;  Colonoscopy;  Surgeon: Izaiah Montes MD;  Location:  GI     ENDOSCOPIC RETROGRADE CHOLANGIOPANCREATOGRAM N/A 5/22/2018    Procedure: COMBINED ENDOSCOPIC RETROGRADE CHOLANGIOPANCREATOGRAPHY, PLACE TUBE/STENT;  Endoscopic Retrograde Cholangiopancreatography with sphincterotomy and pancreatic duct stent placement;  Surgeon: Zay Gaitan MD;  Location:  OR     ENT SURGERY      Repair of deviated septum     ESOPHAGOSCOPY, GASTROSCOPY, DUODENOSCOPY (EGD), COMBINED N/A 8/4/2016    Procedure: COMBINED ESOPHAGOSCOPY, GASTROSCOPY, DUODENOSCOPY (EGD), BIOPSY SINGLE OR MULTIPLE;  Surgeon: Trent Pederson MD;  Location:  GI     ESOPHAGOSCOPY, GASTROSCOPY, DUODENOSCOPY (EGD), COMBINED N/A 8/27/2017    Procedure:  COMBINED ESOPHAGOSCOPY, GASTROSCOPY, DUODENOSCOPY (EGD);;  Surgeon: Los Wynn MD;  Location: [facility]     INCISION AND DRAINAGE ABDOMEN WASHOUT, COMBINED Right 2018    Procedure: COMBINED INCISION AND DRAINAGE ABDOMEN WASHOUT;  COMBINED INCISION AND DRAINAGE right ABDOMEN flank;  Surgeon: Sara Dinh MD;  [facility]: [facility]     LAPAROTOMY EXPLORATORY N/A 2017    Procedure: LAPAROTOMY EXPLORATORY;  Exploratory Laparotomy, washout;  Surgeon: Tip Zhang MD;  [facility]     LAPAROTOMY EXPLORATORY N/A 2017    Procedure: LAPAROTOMY EXPLORATORY;  Exploratory Laparotomy, Washout with closure.;  Surgeon: Tip Zhang MD;  [facility]: [facility]     LAPAROTOMY EXPLORATORY N/A 2017    Procedure: LAPAROTOMY EXPLORATORY;  Exploratory Laparotomy, Right angelique-colectomy, end ileostomy, mucosal fistula, partial omentectomy;  Surgeon: Sara Dinh MD;  Location: [facility]     ORTHOPEDIC SURGERY Right     Repair of dislocated wrist.       RELEASE TRIGGER FINGER Right 11/10/2017    Procedure: RELEASE TRIGGER FINGER;  Debride, V-Y Flap Right Index Finger;  Surgeon: Momo Noonan MD;  [facility]     TAKEDOWN ILEOSTOMY N/A 2018    Procedure: TAKEDOWN ILEOSTOMY;  Exploratory Laparotomy, Lysis of Adhesions, Takedown Of End Ileostomy, Takedown of mucocutaneous fistula, ileocolic resection  And Colorectal Anastomosis, Primary repair of Ventral Hernia, Anesthesia Block ;  Surgeon: Sara Dinh MD;  Location: [facility]     TRACHEOSTOMY  2001    During hospitalization for pneumonia.       TRANSPLANT LIVER RECIPIENT  DONOR N/A 3/4/2017    Procedure: TRANSPLANT LIVER RECIPIENT  DONOR;  Surgeon: Jovan Tran MD;  [facility]: [facility]       Family History   Problem Relation Age of Onset     Diabetes Father      Hypertension Father      Substance Abuse Father      Arthritis Mother      Thyroid Cancer Mother         Survivor!     Cervical  "Cancer Maternal Grandmother      Cerebrovascular Disease Maternal Grandfather      No Known Problems Paternal Grandmother      Prostate Cancer Paternal Grandfather      Colon Cancer No family hx of      Hyperlipidemia No family hx of      Coronary Artery Disease No family hx of      Breast Cancer No family hx of        Social history:  . Former . He used to work as a nurse at [employer].  Former smoker for 2 years, 0.75 packs/day, quit in 1988.  He used to chew tobacco and he stopped this in 2015.  He has not been drinking any alcohol since the liver transplant. 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     Objective:   /90   Pulse 74   Ht 1.829 m (6' 0.01\")   Wt 104 kg (229 lb 4.8 oz)   BMI 31.09 kg/m     Wt Readings from Last 10 Encounters:   05/13/19 104 kg (229 lb 4.8 oz)   03/08/19 100.7 kg (222 lb)   12/05/18 99.8 kg (220 lb)   12/05/18 99.8 kg (220 lb)   11/19/18 111.1 kg (245 lb)   09/19/18 101.3 kg (223 lb 6.4 oz)   09/17/18 102.9 kg (226 lb 12.8 oz)   09/14/18 102.2 kg (225 lb 6.4 oz)   09/10/18 102.5 kg (225 lb 14.4 oz)   08/22/18 101.9 kg (224 lb 9.6 oz)     /90   Pulse 74   Ht 1.829 m (6' 0.01\")   Wt 104 kg (229 lb 4.8 oz)   BMI 31.09 kg/m       General: well developed, well nourished, in NAD  Eyes: anicteric, normal extra-occular movements, no lid lag, no stare  HEENT: MMM, oropharynx clear, no LAD, no thyromegaly; no bruits   CV: RRR, normal S1/S2, no MRG  Pulm: CTAB, normal breathing sounds  Abd: soft, non tender, non distended, multiple healed surgical scars, no notable lipohypertrophy  Neuro: alert, CNII-XII intact  Extremities: no LE edema  Skin: No lesions noted, normal texture  Musculoskeletal: Right index finger amputation, osteoarthritic changes of the fingers    In House Labs:   Lab Results   Component Value Date    A1C 5.5 04/11/2018    A1C 5.1 01/06/2018    A1C  07/06/2017     Canceled, Test credited   Below " Assay Range  NOTIFIED ESTHERCR RODRIGUEZJANI AT 0538 ON 7/6/17 BY CHRISSY      A1C 9.4 (H) 02/16/2017    A1C 6.2 (H) 12/14/2016    HEMOGLOBINA1 6.1 (A) 11/19/2018       Hemoglobin   Date Value Ref Range Status   05/10/2019 12.4 (L) 13.3 - 17.7 g/dL Final     Hematocrit   Date Value Ref Range Status   05/10/2019 38.2 (L) 40.0 - 53.0 % Final     Cholesterol   Date Value Ref Range Status   04/25/2019 134 <200 mg/dL Final     Cholesterol/HDL Ratio   Date Value Ref Range Status   07/03/2012 3.8 0.0 - 5.0 Final     HDL Cholesterol   Date Value Ref Range Status   04/25/2019 46 >39 mg/dL Final     LDL Cholesterol Calculated   Date Value Ref Range Status   04/25/2019 48 <100 mg/dL Final     Comment:     Desirable:       <100 mg/dl     VLDL-Cholesterol   Date Value Ref Range Status   07/03/2012 36 (H) 0 - 30 mg/dL Final     Triglycerides   Date Value Ref Range Status   04/25/2019 198 (H) <150 mg/dL Final     Comment:     Borderline high:  150-199 mg/dl  High:             200-499 mg/dl  Very high:       >499 mg/dl       Albumin Urine mg/L   Date Value Ref Range Status   10/27/2017 122 mg/L Final     TSH   Date Value Ref Range Status   04/02/2018 2.74 0.40 - 4.00 mU/L Final       Last Basic Metabolic Panel:    Sodium   Date Value Ref Range Status   05/10/2019 138 133 - 144 mmol/L Final     Potassium   Date Value Ref Range Status   05/10/2019 5.0 3.4 - 5.3 mmol/L Final     Chloride   Date Value Ref Range Status   05/10/2019 108 94 - 109 mmol/L Final     Calcium   Date Value Ref Range Status   05/10/2019 8.8 8.5 - 10.1 mg/dL Final     Carbon Dioxide   Date Value Ref Range Status   05/10/2019 24 20 - 32 mmol/L Final     Urea Nitrogen   Date Value Ref Range Status   05/10/2019 54 (H) 7 - 30 mg/dL Final     Creatinine   Date Value Ref Range Status   05/10/2019 1.60 (H) 0.66 - 1.25 mg/dL Final     GFR Estimate   Date Value Ref Range Status   05/10/2019 48 (L) >60 mL/min/[1.73_m2] Final     Comment:     Non  GFR Calc  Starting  12/18/2018, serum creatinine based estimated GFR (eGFR) will be   calculated using the Chronic Kidney Disease Epidemiology Collaboration   (CKD-EPI) equation.       Glucose   Date Value Ref Range Status   05/10/2019 103 (H) 70 - 99 mg/dL Final       AST   Date Value Ref Range Status   05/10/2019 25 0 - 45 U/L Final     ALT   Date Value Ref Range Status   05/10/2019 37 0 - 70 U/L Final     Albumin Urine mg/g Cr   Date Value Ref Range Status   10/27/2017 132.46 (H) 0 - 17 mg/g Cr Final       Assessment/Treatment Plan:      Camacho Bhagat is a 53 year old man w/ PMx of CASTAÑEDA/HCC s/p liver transplant (3/2017), fibromyalgia, DVT, HTN, RONNIE, OA, and CVA who presents for follow-up of his DM-II    1. Type 2 Diabetes:   A1c less reliable in the context of anemia.  Meter still shows some variation of the morning blood sugar. Pt unclear what may be causing this. Cannot test at night given lack of testing strips due to expense. Discussed possible glucose sensor. He is concerned about copay on the device, swimming w/ the device, and his dog ripping device off of his body.     Recommendations  - continue to check BG before meals and at bedtime  - for 4 days, pt to check bedtime BG in addition pre-breakfast and pre-dinner BG  - pt to contact Dr. West via [employer] w/ results  - try to administer NovoLog 15 minutes prior to eating    - 1 unit:3grams CHO w/ meals  - increase glargine to 55units daily   - DM retinopathy exam in 8/2019  - pt needs diabetic foot exam at next visit     DM complications:  Nephropathy - + proteinuria, managed by nephrology, now off losartan for hyperkalemia  Neuropathy - + sx of parasthesias, + h/o gangrene associated with critical illness and pressor use, requried wound debridement but now healed.   CVD/Lipids - not on statin but LDL in 30s, will continue to monitor.  ? CVA in 2012; not formal PAD - he might benefit from baby ASA    2.  Hypertension  /90. Says -150s/70-80s at home since  stopping losartan 3/2019 for hyperkalemia. Follows w/ nephrology, who is tracking his BP. Should be <140/90 to decrease risk of progression of diabetic renal disease.    Recommendations  - continue home monitoring and close follow-up w/ Nephrology     No orders of the defined types were placed in this encounter.    Pt seen and discussed w/ Dr. West.     Stan Cedillo MD  Internal Medicine, PGY-2   [phone]    I have personally examined the patient, reviewed and edited the resident's note and agree with the plan of care.    Alisa West MD.

## 2019-05-13 NOTE — PROGRESS NOTES
Endocrine Clinic Follow-up Note  Date: May 13, 2019     HPI: Camacho Bhagat is a 53 year old man w/ PMx of CASTAÑEDA/HCC s/p liver transplant (3/2017), fibromyalgia, DVT, HTN, RONNIE, OA, CVA (2012) who presents for follow-up of his DM-II.    History of Diabetes  Type 2   This was diagnosed in 2002.  Oral meds first, later placed on insulin for the last 7 years.     Related history: h/o CASTAÑEDA cirrhosis +/- work exposures, and HCC s/p liver transplant 3/2017. Course complicated by acute abdomen/neutropenic fever requiring right hemicolectomy and colostomy Fall 2017. He later underwent colostomy takedown 1/5/18 which was complicated by abdominal wall abscess at a drain site. During this course, he has also developed excessive proteinuria which is followed by nephrology.     Current DM Regimen:  52 units of Lantus in the morning.  1 unit NovoLog per 3 g carbohydrates for all meals and snacks.  Correction dose of 1 unit per 25 mg above 150 during daytime and above 200 at bedtime.    The glucometer readings revealed that he checks his blood glucose 2 times daily, before breakfast and before dinner. Cannot test more due to cost of testing strips. Inconsistent mealtimes. The average blood glucose over the last month is 175 with a standard deviation of 70 and a range variable between 48 and 320. Reports 4 hypoglycemic episodes in past 6 mo. Two episodes documented by the meter (48, 66).  Both were at night >2 hours post-meal. Starts to feel hypoglycemic symptoms when BG <70. Experiences tremors when BG are in the 30s or 40s.  Has used an johan (One Touch Verio)  To help document the carbohydrate intake and the NovoLog dose administered for meals. He generally uses 26-38 units at meals at a coverage scale of 1 unit:3grams CHO. A1c today was again 6.1, in the context of anemia.    Related meds:  Prednisone started for transplant and the dose remains 2.5 mg per day.  He continues to take tacrolimus and myfortic acid. He stopped his  losartan in conjunction w/ his renal NP due to hyperkalemia. BP at home have been 130s-150s/70-80s.     Diet: 2 meals per day  Breakfast: varies; eats egg whites, turkey billy/sausage, english muffins, oatmeal, cream of wheat, or cereal with orange juice  Lunch: skips   Dinner: often cooks at home; grille chicken, fish, beef, lean pork, veggies  Snacks: rarely; sometimes beef jerky   Alcohol or sugared beverages: orange juice w/ breakfast as above (6-8oz), occasional latte     Exercise   Walks dog BID, around 1 mile/walk  Has not lifted weights in 3 weeks  Swims 3 times a week, but has not swam much recently     Complications  + chronic complications.     1. Retinopathy: No known eye disease  Last eye exam was 8/2018.  H/o DR. He has cataracts.      2.  Nephropathy: yes. He has significant proteinuria and f/up with nephrology.  Recent GFR in the 50s. He was started on losartan on 4/4/18 and the dose was increased to 50 mg daily. Discontinued losartan around 3/2019 due to hyperkalemia.    Albumin Urine mg/g Cr   Date Value Ref Range Status   10/27/2017 132.46 (H) 0 - 17 mg/g Cr Final       3. Neuropathy - left foot - pins/needles sensation.     5. Macrovascular: none     6. Lipids: not on statin , last LDL in 30s      Current Outpatient Medications:      amLODIPine (NORVASC) 10 MG tablet, Take 1 tablet (10 mg) by mouth daily, Disp: 90 tablet, Rfl: 3     cholecalciferol (VITAMIN D3) 1000 UNIT tablet, Take 1 tablet (1,000 Units) by mouth daily (Patient taking differently: Take 1,000 Units by mouth every morning ), Disp: 100 tablet, Rfl: 3     CIALIS 5 MG tablet, Take 5 mg by mouth as needed , Disp: , Rfl: 1     cyclobenzaprine (FLEXERIL) 10 MG tablet, Take 1 tablet (10 mg) by mouth 3 times daily as needed for muscle spasms, Disp: 90 tablet, Rfl: 3     fluticasone (FLONASE) 50 MCG/ACT nasal spray, Spray 1 spray into both nostrils daily, Disp: 15 mL, Rfl: 1     furosemide (LASIX) 40 MG tablet, Take 1 tablet (40 mg) by  mouth 2 times daily, Disp: 180 tablet, Rfl: 3     gabapentin (NEURONTIN) 300 MG capsule, Take 1 capsule (300 mg) by mouth 3 times daily, Disp: 270 capsule, Rfl: 3     Glucose Blood (BLOOD GLUCOSE TEST STRIPS) STRP, 1 strip by Lancet route 3 times daily, Disp: 300 each, Rfl: 3     insulin glargine (LANTUS SOLOSTAR PEN) 100 UNIT/ML pen, Inject 52 Units Subcutaneous every morning, Disp: 48 mL, Rfl: 3     Insulin Lispro (HUMALOG KWIKPEN) 200 UNIT/ML soln, Use one unit per 3 grams carbohydrates for all meals and snacks. Total daily dose up to 80 U., Disp: 72 mL, Rfl: 3     insulin pen needle (BD ENE U/F) 32G X 4 MM miscellaneous, Use 1 pen needle 4 times daily or as directed., Disp: 400 each, Rfl: 3     metoprolol succinate ER (TOPROL-XL) 100 MG 24 hr tablet, Take 1 tablet (100 mg) by mouth daily Take a total of 150 mg daily, Disp: 90 tablet, Rfl: 3     metoprolol succinate ER (TOPROL-XL) 50 MG 24 hr tablet, Take 1 tablet (50 mg) by mouth daily Take a total of 150 mg daily, Disp: 90 tablet, Rfl: 3     multivitamin, therapeutic with minerals (MULTI-VITAMIN) TABS tablet, Take 1 tablet by mouth every morning, Disp: , Rfl:      mycophenolic acid (GENERIC EQUIVALENT) 360 MG EC tablet, Take 2 tablets (720 mg) by mouth every 12 hours, Disp: 360 tablet, Rfl: 3     omeprazole (PRILOSEC) 20 MG DR capsule, Take 1 capsule (20 mg) by mouth daily, Disp: 90 capsule, Rfl: 3     oxyCODONE (ROXICODONE) 5 MG tablet, Take 1 tablet (5 mg) by mouth every 4 hours as needed for pain maximum 6 tablet(s) per day, Disp: 30 tablet, Rfl: 0     patiromer (VELTASSA) 8.4 g packet, Take 16.8 g by mouth daily, Disp: 180 each, Rfl: 3     predniSONE (DELTASONE) 2.5 MG tablet, Take 2.5 mg by mouth daily., Disp: 90 tablet, Rfl: 3     tacrolimus (GENERIC EQUIVALENT) 1 MG capsule, Take 4 capsules (4 mg) by mouth every 12 hours, Disp: 240 capsule, Rfl: 11     ursodiol (ACTIGALL) 500 MG tablet, Take 1 tablet (500 mg) by mouth 2 times daily, Disp: 180 tablet,  Rfl: 3      Past Medical History:   Diagnosis Date     Depressive disorder, not elsewhere classified      Diabetes type 2, controlled (H) 11/10/2016     Esophageal reflux      Fibromyalgia 1/2009    dx with Dr Benitez( Rheum)     Gangrene of finger (H) 8/25/2017     H/O deep venous thrombosis 11/2001    Permanent IVC filter in place.     H/O CASTAÑEDA (nonalcoholic steatohepatitis)      H/O Pneumonia, organism unspecified(486) 10/2001    Included ARDS, sepsis, and  acute renal failure; hospitalized, required tracheostomy placement.     H/O: HTN (hypertension) 11/2001    No longer prescribed antihypertensive medication.       History of CVA (cerebrovascular accident)      History of hepatocellular carcinoma      History of liver transplant (H)      History of obstructive sleep apnea     No longer wears CPAP since losing approximately 200 pounds with his liver transplant and its complications.       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     Osteoarthritis      Presence of PERMANENT IVC filter      Rheumatoid arthritis(714.0)    DEXA scan normal prior to transplant, in 2016    Past Surgical History:   Procedure Laterality Date     BENCH LIVER N/A 3/4/2017    Procedure: BENCH LIVER;  Surgeon: Jovan Tran MD;  Location:  OR      TOTAL KNEE ARTHROPLASTY  2008    Right knee arthroscopy     CHOLECYSTECTOMY       COLONOSCOPY N/A 7/21/2017    Procedure: COMBINED COLONOSCOPY, SINGLE OR MULTIPLE BIOPSY/POLYPECTOMY BY BIOPSY;  Colonoscopy;  Surgeon: Izaiah Montes MD;  Location:  GI     ENDOSCOPIC RETROGRADE CHOLANGIOPANCREATOGRAM N/A 5/22/2018    Procedure: COMBINED ENDOSCOPIC RETROGRADE CHOLANGIOPANCREATOGRAPHY, PLACE TUBE/STENT;  Endoscopic Retrograde Cholangiopancreatography with sphincterotomy and pancreatic duct stent placement;   Surgeon: Zay Gaitan MD;  Location: [facility]     ENT SURGERY      Repair of deviated septum     ESOPHAGOSCOPY, GASTROSCOPY, DUODENOSCOPY (EGD), COMBINED N/A 8/4/2016    Procedure: COMBINED ESOPHAGOSCOPY, GASTROSCOPY, DUODENOSCOPY (EGD), BIOPSY SINGLE OR MULTIPLE;  Surgeon: Trent Pederson MD;  Location:  GI     ESOPHAGOSCOPY, GASTROSCOPY, DUODENOSCOPY (EGD), COMBINED N/A 8/27/2017    Procedure: COMBINED ESOPHAGOSCOPY, GASTROSCOPY, DUODENOSCOPY (EGD);;  Surgeon: Los Wynn MD;  Location: [facility]     INCISION AND DRAINAGE ABDOMEN WASHOUT, COMBINED Right 2/14/2018    Procedure: COMBINED INCISION AND DRAINAGE ABDOMEN WASHOUT;  COMBINED INCISION AND DRAINAGE right ABDOMEN flank;  Surgeon: Sara Dinh MD;  Location: [facility]     LAPAROTOMY EXPLORATORY N/A 7/4/2017    Procedure: LAPAROTOMY EXPLORATORY;  Exploratory Laparotomy, washout;  Surgeon: Tip Zhang MD;  [facility]: [facility]     LAPAROTOMY EXPLORATORY N/A 7/5/2017    Procedure: LAPAROTOMY EXPLORATORY;  Exploratory Laparotomy, Washout with closure.;  Surgeon: Tip Zhang MD;  [facility]     LAPAROTOMY EXPLORATORY N/A 7/26/2017    Procedure: LAPAROTOMY EXPLORATORY;  Exploratory Laparotomy, Right angelique-colectomy, end ileostomy, mucosal fistula, partial omentectomy;  Surgeon: Sara Dinh MD;  Location: [facility]     ORTHOPEDIC SURGERY Right     Repair of dislocated wrist.       RELEASE TRIGGER FINGER Right 11/10/2017    Procedure: RELEASE TRIGGER FINGER;  Debride, V-Y Flap Right Index Finger;  Surgeon: Momo Noonan MD;  [facility]     TAKEDOWN ILEOSTOMY N/A 1/5/2018    Procedure: TAKEDOWN ILEOSTOMY;  Exploratory Laparotomy, Lysis of Adhesions, Takedown Of End Ileostomy, Takedown of mucocutaneous fistula, ileocolic resection  And Colorectal Anastomosis, Primary repair of Ventral Hernia, Anesthesia Block ;  Surgeon: Sara Dinh MD;  Location: [facility]     TRACHEOSTOMY  2001     During hospitalization for pneumonia.       TRANSPLANT LIVER RECIPIENT  DONOR N/A 3/4/2017    Procedure: TRANSPLANT LIVER RECIPIENT  DONOR;  Surgeon: Jovan Tran MD;  Location: [facility] OR     Review of Systems     Constitutional: weight slightly increased, 9 lbs greater than 2018. Appetite is good   Head: no headache   Eye: no vision change/ loss of peripheral vision.   ENT: No throat congestion.   Respiratory: no cough   Cardiovascular: no chest pain or tachycardia  Gastrointestinal: Negative for vomiting, abdominal pain and diarrhea.  Genitourinary: No bladder problems.  Musculoskeletal: Negative for myalgias. No weakness.Neurological: Negative for seizures and headaches. Numbness and tingling on L foot.   Psychiatric/Behavioral: Negative dysphoric mood.    Answers for HPI/ROS submitted by the patient on 5/10/2019   General Symptoms: No  Skin Symptoms: No  HENT Symptoms: No  EYE SYMPTOMS: No  HEART SYMPTOMS: No  LUNG SYMPTOMS: No  INTESTINAL SYMPTOMS: No  URINARY SYMPTOMS: No  REPRODUCTIVE SYMPTOMS: No  SKELETAL SYMPTOMS: Yes  BLOOD SYMPTOMS: No  NERVOUS SYSTEM SYMPTOMS: No  MENTAL HEALTH SYMPTOMS: No  Back pain: Yes  Muscle aches: No  Neck pain: Yes  Swollen joints: Yes  Joint pain: Yes  Bone pain: No  Muscle cramps: Yes  Joint stiffness: Yes  Bone fracture: No      Past Medical History:   Diagnosis Date     Depressive disorder, not elsewhere classified      Diabetes type 2, controlled (H) 11/10/2016     Esophageal reflux      Fibromyalgia 2009    dx with Dr Benitez( Rheum)     Gangrene of finger (H) 2017     H/O deep venous thrombosis 2001    Permanent IVC filter in place.     H/O CASTAÑEDA (nonalcoholic steatohepatitis)      H/O Pneumonia, organism unspecified(486) 10/2001    Included ARDS, sepsis, and  acute renal failure; hospitalized, required tracheostomy placement.     H/O: HTN (hypertension) 2001    No longer prescribed antihypertensive medication.       History of CVA  (cerebrovascular accident)      History of hepatocellular carcinoma      History of liver transplant (H)      History of obstructive sleep apnea     No longer wears CPAP since losing approximately 200 pounds with his liver transplant and its complications.       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     Osteoarthritis      Presence of PERMANENT IVC filter      Rheumatoid arthritis(714.0)        Past Surgical History:   Procedure Laterality Date     BENCH LIVER N/A 3/4/2017    Procedure: BENCH LIVER;  Surgeon: Jovan Tran MD;  Location: [facility] TOTAL KNEE ARTHROPLASTY  2008    Right knee arthroscopy     CHOLECYSTECTOMY       COLONOSCOPY N/A 7/21/2017    Procedure: COMBINED COLONOSCOPY, SINGLE OR MULTIPLE BIOPSY/POLYPECTOMY BY BIOPSY;  Colonoscopy;  Surgeon: Izaiah Montes MD;  Location:  GI     ENDOSCOPIC RETROGRADE CHOLANGIOPANCREATOGRAM N/A 5/22/2018    Procedure: COMBINED ENDOSCOPIC RETROGRADE CHOLANGIOPANCREATOGRAPHY, PLACE TUBE/STENT;  Endoscopic Retrograde Cholangiopancreatography with sphincterotomy and pancreatic duct stent placement;  Surgeon: Zay Gaitan MD;  Location:  OR     ENT SURGERY      Repair of deviated septum     ESOPHAGOSCOPY, GASTROSCOPY, DUODENOSCOPY (EGD), COMBINED N/A 8/4/2016    Procedure: COMBINED ESOPHAGOSCOPY, GASTROSCOPY, DUODENOSCOPY (EGD), BIOPSY SINGLE OR MULTIPLE;  Surgeon: Trent Pederson MD;  Location:  GI     ESOPHAGOSCOPY, GASTROSCOPY, DUODENOSCOPY (EGD), COMBINED N/A 8/27/2017    Procedure: COMBINED ESOPHAGOSCOPY, GASTROSCOPY, DUODENOSCOPY (EGD);;  Surgeon: Los Wynn MD;  Location:  GI     INCISION AND DRAINAGE ABDOMEN WASHOUT, COMBINED Right 2/14/2018    Procedure: COMBINED INCISION AND DRAINAGE ABDOMEN WASHOUT;  COMBINED INCISION AND DRAINAGE right  ABDOMEN flank;  Surgeon: Sara Dinh MD;  [facility]: [facility]     LAPAROTOMY EXPLORATORY N/A 2017    Procedure: LAPAROTOMY EXPLORATORY;  Exploratory Laparotomy, washout;  Surgeon: Tip Zhang MD;  [facility]     LAPAROTOMY EXPLORATORY N/A 2017    Procedure: LAPAROTOMY EXPLORATORY;  Exploratory Laparotomy, Washout with closure.;  Surgeon: Tip Zhang MD;  [facility]     LAPAROTOMY EXPLORATORY N/A 2017    Procedure: LAPAROTOMY EXPLORATORY;  Exploratory Laparotomy, Right angelique-colectomy, end ileostomy, mucosal fistula, partial omentectomy;  Surgeon: Sara Dinh MD;  [facility]: [facility]     ORTHOPEDIC SURGERY Right     Repair of dislocated wrist.       RELEASE TRIGGER FINGER Right 11/10/2017    Procedure: RELEASE TRIGGER FINGER;  Debride, V-Y Flap Right Index Finger;  Surgeon: Momo Noonan MD;  [facility]     TAKEDOWN ILEOSTOMY N/A 2018    Procedure: TAKEDOWN ILEOSTOMY;  Exploratory Laparotomy, Lysis of Adhesions, Takedown Of End Ileostomy, Takedown of mucocutaneous fistula, ileocolic resection  And Colorectal Anastomosis, Primary repair of Ventral Hernia, Anesthesia Block ;  Surgeon: Sara Dinh MD;  [facility]: [facility]     TRACHEOSTOMY  2001    During hospitalization for pneumonia.       TRANSPLANT LIVER RECIPIENT  DONOR N/A 3/4/2017    Procedure: TRANSPLANT LIVER RECIPIENT  DONOR;  Surgeon: Jovan Tran MD;  Location: [facility]       Family History   Problem Relation Age of Onset     Diabetes Father      Hypertension Father      Substance Abuse Father      Arthritis Mother      Thyroid Cancer Mother         Survivor!     Cervical Cancer Maternal Grandmother      Cerebrovascular Disease Maternal Grandfather      No Known Problems Paternal Grandmother      Prostate Cancer Paternal Grandfather      Colon Cancer No family hx of      Hyperlipidemia No family hx of      Coronary Artery Disease No family hx of   "    Breast Cancer No family hx of        Social history:  . Former . 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  Former smoker for 2 years, 0.75 packs/day, quit in 1988.  He used to chew tobacco and he stopped this in 2015.  He has not been drinking any alcohol since the liver transplant. 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     Objective:   /90   Pulse 74   Ht 1.829 m (6' 0.01\")   Wt 104 kg (229 lb 4.8 oz)   BMI 31.09 kg/m    Wt Readings from Last 10 Encounters:   05/13/19 104 kg (229 lb 4.8 oz)   03/08/19 100.7 kg (222 lb)   12/05/18 99.8 kg (220 lb)   12/05/18 99.8 kg (220 lb)   11/19/18 111.1 kg (245 lb)   09/19/18 101.3 kg (223 lb 6.4 oz)   09/17/18 102.9 kg (226 lb 12.8 oz)   09/14/18 102.2 kg (225 lb 6.4 oz)   09/10/18 102.5 kg (225 lb 14.4 oz)   08/22/18 101.9 kg (224 lb 9.6 oz)     /90   Pulse 74   Ht 1.829 m (6' 0.01\")   Wt 104 kg (229 lb 4.8 oz)   BMI 31.09 kg/m      General: well developed, well nourished, in NAD  Eyes: anicteric, normal extra-occular movements, no lid lag, no stare  HEENT: MMM, oropharynx clear, no LAD, no thyromegaly; no bruits   CV: RRR, normal S1/S2, no MRG  Pulm: CTAB, normal breathing sounds  Abd: soft, non tender, non distended, multiple healed surgical scars, no notable lipohypertrophy  Neuro: alert, CNII-XII intact  Extremities: no LE edema  Skin: No lesions noted, normal texture  Musculoskeletal: Right index finger amputation, osteoarthritic changes of the fingers    In House Labs:   Lab Results   Component Value Date    A1C 5.5 04/11/2018    A1C 5.1 01/06/2018    A1C  07/06/2017     Canceled, Test credited   Below Assay Range  NOTIFIED LEONARD ONEILL AT 0538 ON 7/6/17 BY CHRISSY      A1C 9.4 (H) 02/16/2017    A1C 6.2 (H) 12/14/2016    HEMOGLOBINA1 6.1 (A) 11/19/2018       Hemoglobin   Date Value Ref Range Status   05/10/2019 12.4 (L) 13.3 - 17.7 g/dL Final     Hematocrit   Date Value Ref Range " Status   05/10/2019 38.2 (L) 40.0 - 53.0 % Final     Cholesterol   Date Value Ref Range Status   04/25/2019 134 <200 mg/dL Final     Cholesterol/HDL Ratio   Date Value Ref Range Status   07/03/2012 3.8 0.0 - 5.0 Final     HDL Cholesterol   Date Value Ref Range Status   04/25/2019 46 >39 mg/dL Final     LDL Cholesterol Calculated   Date Value Ref Range Status   04/25/2019 48 <100 mg/dL Final     Comment:     Desirable:       <100 mg/dl     VLDL-Cholesterol   Date Value Ref Range Status   07/03/2012 36 (H) 0 - 30 mg/dL Final     Triglycerides   Date Value Ref Range Status   04/25/2019 198 (H) <150 mg/dL Final     Comment:     Borderline high:  150-199 mg/dl  High:             200-499 mg/dl  Very high:       >499 mg/dl       Albumin Urine mg/L   Date Value Ref Range Status   10/27/2017 122 mg/L Final     TSH   Date Value Ref Range Status   04/02/2018 2.74 0.40 - 4.00 mU/L Final       Last Basic Metabolic Panel:    Sodium   Date Value Ref Range Status   05/10/2019 138 133 - 144 mmol/L Final     Potassium   Date Value Ref Range Status   05/10/2019 5.0 3.4 - 5.3 mmol/L Final     Chloride   Date Value Ref Range Status   05/10/2019 108 94 - 109 mmol/L Final     Calcium   Date Value Ref Range Status   05/10/2019 8.8 8.5 - 10.1 mg/dL Final     Carbon Dioxide   Date Value Ref Range Status   05/10/2019 24 20 - 32 mmol/L Final     Urea Nitrogen   Date Value Ref Range Status   05/10/2019 54 (H) 7 - 30 mg/dL Final     Creatinine   Date Value Ref Range Status   05/10/2019 1.60 (H) 0.66 - 1.25 mg/dL Final     GFR Estimate   Date Value Ref Range Status   05/10/2019 48 (L) >60 mL/min/[1.73_m2] Final     Comment:     Non  GFR Calc  Starting 12/18/2018, serum creatinine based estimated GFR (eGFR) will be   calculated using the Chronic Kidney Disease Epidemiology Collaboration   (CKD-EPI) equation.       Glucose   Date Value Ref Range Status   05/10/2019 103 (H) 70 - 99 mg/dL Final       AST   Date Value Ref Range Status    05/10/2019 25 0 - 45 U/L Final     ALT   Date Value Ref Range Status   05/10/2019 37 0 - 70 U/L Final     Albumin Urine mg/g Cr   Date Value Ref Range Status   10/27/2017 132.46 (H) 0 - 17 mg/g Cr Final       Assessment/Treatment Plan:      Camacho Bhagat is a 53 year old man w/ PMx of CASTAÑEDA/HCC s/p liver transplant (3/2017), fibromyalgia, DVT, HTN, RONNIE, OA, and CVA who presents for follow-up of his DM-II    1. Type 2 Diabetes:   A1c less reliable in the context of anemia.  Meter still shows some variation of the morning blood sugar. Pt unclear what may be causing this. Cannot test at night given lack of testing strips due to expense. Discussed possible glucose sensor. He is concerned about copay on the device, swimming w/ the device, and his dog ripping device off of his body.     Recommendations  - continue to check BG before meals and at bedtime  - for 4 days, pt to check bedtime BG in addition pre-breakfast and pre-dinner BG  - pt to contact Dr. West via [employer] w/ results  - try to administer NovoLog 15 minutes prior to eating    - 1 unit:3grams CHO w/ meals  - increase glargine to 55units daily   - DM retinopathy exam in 8/2019  - pt needs diabetic foot exam at next visit     DM complications:  Nephropathy - + proteinuria, managed by nephrology, now off losartan for hyperkalemia  Neuropathy - + sx of parasthesias, + h/o gangrene associated with critical illness and pressor use, requried wound debridement but now healed.   CVD/Lipids - not on statin but LDL in 30s, will continue to monitor.  ? CVA in 2012; not formal PAD - he might benefit from baby ASA    2.  Hypertension  /90. Says -150s/70-80s at home since stopping losartan 3/2019 for hyperkalemia. Follows w/ nephrology, who is tracking his BP. Should be <140/90 to decrease risk of progression of diabetic renal disease.    Recommendations  - continue home monitoring and close follow-up w/ Nephrology     No orders of the defined types were  placed in this encounter.    Pt seen and discussed w/ Dr. West.     Stan Cedillo MD  Internal Medicine, PGY-2   [phone]    I have personally examined the patient, reviewed and edited the resident's note and agree with the plan of care.    Alisa West MD.

## 2019-05-14 PROBLEM — I15.1 HYPERTENSION SECONDARY TO OTHER RENAL DISORDERS: Status: ACTIVE | Noted: 2019-05-14

## 2019-05-14 PROBLEM — E11.42 TYPE 2 DIABETES MELLITUS WITH DIABETIC POLYNEUROPATHY, WITH LONG-TERM CURRENT USE OF INSULIN (H): Status: ACTIVE | Noted: 2018-09-10

## 2019-05-14 PROBLEM — Z79.4 TYPE 2 DIABETES MELLITUS WITH DIABETIC POLYNEUROPATHY, WITH LONG-TERM CURRENT USE OF INSULIN (H): Status: ACTIVE | Noted: 2018-09-10

## 2019-05-29 ENCOUNTER — HOSPITAL ENCOUNTER (OUTPATIENT)
Dept: LAB | Facility: CLINIC | Age: 54
Discharge: HOME | End: 2019-05-29
Attending: INTERNAL MEDICINE | Admitting: INTERNAL MEDICINE
Payer: COMMERCIAL

## 2019-05-29 DIAGNOSIS — Z94.4 LIVER REPLACED BY TRANSPLANT (H): ICD-10-CM

## 2019-05-29 LAB
ALBUMIN SERPL-MCNC: 3.8 G/DL (ref 3.4–5)
ALP SERPL-CCNC: 241 U/L (ref 40–150)
ALT SERPL W P-5'-P-CCNC: 37 U/L (ref 0–70)
ANION GAP SERPL CALCULATED.3IONS-SCNC: 5 MMOL/L (ref 3–14)
AST SERPL W P-5'-P-CCNC: 21 U/L (ref 0–45)
BILIRUB DIRECT SERPL-MCNC: 0.3 MG/DL (ref 0–0.2)
BILIRUB SERPL-MCNC: 0.8 MG/DL (ref 0.2–1.3)
BUN SERPL-MCNC: 39 MG/DL (ref 7–30)
CALCIUM SERPL-MCNC: 9 MG/DL (ref 8.5–10.1)
CHLORIDE SERPL-SCNC: 112 MMOL/L (ref 94–109)
CO2 SERPL-SCNC: 25 MMOL/L (ref 20–32)
CREAT SERPL-MCNC: 1.5 MG/DL (ref 0.66–1.25)
ERYTHROCYTE [DISTWIDTH] IN BLOOD BY AUTOMATED COUNT: 13.6 % (ref 10–15)
GFR SERPL CREATININE-BSD FRML MDRD: 52 ML/MIN/{1.73_M2}
GLUCOSE SERPL-MCNC: 84 MG/DL (ref 70–99)
HCT VFR BLD AUTO: 36.5 % (ref 40–53)
HGB BLD-MCNC: 12.1 G/DL (ref 13.3–17.7)
MCH RBC QN AUTO: 31.1 PG (ref 26.5–33)
MCHC RBC AUTO-ENTMCNC: 33.2 G/DL (ref 31.5–36.5)
MCV RBC AUTO: 94 FL (ref 78–100)
PLATELET # BLD AUTO: 77 10E9/L (ref 150–450)
POTASSIUM SERPL-SCNC: 4.9 MMOL/L (ref 3.4–5.3)
PROT SERPL-MCNC: 6.8 G/DL (ref 6.8–8.8)
RBC # BLD AUTO: 3.89 10E12/L (ref 4.4–5.9)
SODIUM SERPL-SCNC: 142 MMOL/L (ref 133–144)
WBC # BLD AUTO: 5 10E9/L (ref 4–11)

## 2019-05-29 PROCEDURE — 80076 HEPATIC FUNCTION PANEL: CPT | Performed by: INTERNAL MEDICINE

## 2019-05-29 PROCEDURE — 80197 ASSAY OF TACROLIMUS: CPT | Performed by: INTERNAL MEDICINE

## 2019-05-29 PROCEDURE — 80048 BASIC METABOLIC PNL TOTAL CA: CPT | Performed by: INTERNAL MEDICINE

## 2019-05-29 PROCEDURE — 85027 COMPLETE CBC AUTOMATED: CPT | Performed by: INTERNAL MEDICINE

## 2019-05-29 PROCEDURE — 36415 COLL VENOUS BLD VENIPUNCTURE: CPT | Performed by: INTERNAL MEDICINE

## 2019-05-30 LAB
TACROLIMUS BLD-MCNC: 6.6 UG/L (ref 5–15)
TME LAST DOSE: NORMAL H

## 2019-06-02 ENCOUNTER — MYC REFILL (OUTPATIENT)
Dept: TRANSPLANT | Facility: CLINIC | Age: 54
End: 2019-06-02

## 2019-06-02 ENCOUNTER — MYC REFILL (OUTPATIENT)
Dept: INTERNAL MEDICINE | Facility: CLINIC | Age: 54
End: 2019-06-02

## 2019-06-02 DIAGNOSIS — M15.0 PRIMARY OSTEOARTHRITIS INVOLVING MULTIPLE JOINTS: ICD-10-CM

## 2019-06-02 DIAGNOSIS — M54.50 LOW BACK PAIN AT MULTIPLE SITES: ICD-10-CM

## 2019-06-02 DIAGNOSIS — M06.9 RHEUMATOID ARTHRITIS INVOLVING MULTIPLE SITES, UNSPECIFIED RHEUMATOID FACTOR PRESENCE: ICD-10-CM

## 2019-06-03 RX ORDER — OXYCODONE HYDROCHLORIDE 5 MG/1
5 TABLET ORAL EVERY 4 HOURS PRN
Qty: 30 TABLET | Refills: 0 | Status: SHIPPED | OUTPATIENT
Start: 2019-06-03 | End: 2019-10-19

## 2019-06-03 RX ORDER — GABAPENTIN 300 MG/1
300 CAPSULE ORAL 3 TIMES DAILY
Qty: 270 CAPSULE | Refills: 3 | Status: SHIPPED | OUTPATIENT
Start: 2019-06-03 | End: 2020-06-15

## 2019-06-03 NOTE — TELEPHONE ENCOUNTER
Rx for Roxicodone brought to the lock box.    RO BONNER at 12:03 PM on 6/3/2019.        Reason For Call:        Chief Complaint   Patient presents with     Refill Request                 Medication Name, Dose and Monthly Quantity:   oxyCODONE (ROXICODONE) 5 MG tablet    Diagnosis requiring opiates:   Primary osteoarthritis involving multiple joints      Problem List Updated:   Yes     Opioid Agreement On File - [facility] PAIN CONTRACT ID# [id number]:       Last Urine Drug Screen (at least once every 12 months) Date:     Unexpected Results:        [place]  Data Reviewed (at least once every 3 months) Date:   06/03/19  Unexpected Results:         Last Fill Date:    03/12/19    Next Fill Date:   06/03/19     Last Visit with PCP:    09/17/18    Future Visits with PCP:    Nothing scheduled  Processing:   Lock box      RO BONNER at 7:32 AM on 6/3/2019.

## 2019-06-05 ENCOUNTER — OFFICE VISIT (OUTPATIENT)
Dept: GASTROENTEROLOGY | Facility: CLINIC | Age: 54
End: 2019-06-05
Attending: INTERNAL MEDICINE
Payer: COMMERCIAL

## 2019-06-05 VITALS
BODY MASS INDEX: 32.07 KG/M2 | HEART RATE: 76 BPM | TEMPERATURE: 98.3 F | RESPIRATION RATE: 16 BRPM | WEIGHT: 236.8 LBS | HEIGHT: 72 IN | DIASTOLIC BLOOD PRESSURE: 78 MMHG | SYSTOLIC BLOOD PRESSURE: 162 MMHG | OXYGEN SATURATION: 99 %

## 2019-06-05 DIAGNOSIS — C22.0 HEPATOCELLULAR CARCINOMA (H): Primary | ICD-10-CM

## 2019-06-05 PROCEDURE — G0463 HOSPITAL OUTPT CLINIC VISIT: HCPCS | Mod: ZF

## 2019-06-05 ASSESSMENT — PAIN SCALES - GENERAL: PAINLEVEL: NO PAIN (0)

## 2019-06-05 ASSESSMENT — MIFFLIN-ST. JEOR: SCORE: 1952.12

## 2019-06-05 NOTE — NURSING NOTE
Chief Complaint   Patient presents with     RECHECK     S/P Liver TX 3/4/2017       Vital signs:  Temp: 98.3  F (36.8  C) Temp src: Oral BP: 162/78 Pulse: 76   Resp: 16 SpO2: 99 %     Height: 182.9 cm (6') Weight: 107.4 kg (236 lb 12.8 oz)  Estimated body mass index is 32.12 kg/m  as calculated from the following:    Height as of this encounter: 1.829 m (6').    Weight as of this encounter: 107.4 kg (236 lb 12.8 oz).          Katie Harmon [facility]  6/5/2019 1:01 PM

## 2019-06-05 NOTE — PROGRESS NOTES
I had the pleasure of seeing Camacho Bhagat for followup in the Liver Transplant Clinic at the [facility] on 06/05/2019.  Mr. Bhagat returns no more than 2 years status post liver transplantation for cirrhosis complicated by hepatocellular carcinoma.      He is doing well at this visit.  He denies any abdominal pain, itching or skin rash or fatigue.  He denies any increased abdominal girth or lower extremity edema.  He denies any fevers or chills, cough or shortness of breath.  He denies any nausea or vomiting.  He does have some intermittent diarrhea.  He has had a previous partial colectomy that may be contributing.  His appetite has been good.  His weight is up a few pounds.  There otherwise have been no other new events since he was last seen.     Current Outpatient Medications   Medication     amLODIPine (NORVASC) 10 MG tablet     cholecalciferol (VITAMIN D3) 1000 UNIT tablet     CIALIS 5 MG tablet     cyclobenzaprine (FLEXERIL) 10 MG tablet     fluticasone (FLONASE) 50 MCG/ACT nasal spray     furosemide (LASIX) 40 MG tablet     gabapentin (NEURONTIN) 300 MG capsule     Glucose Blood (BLOOD GLUCOSE TEST STRIPS) STRP     insulin glargine (LANTUS SOLOSTAR PEN) 100 UNIT/ML pen     Insulin Lispro (HUMALOG KWIKPEN) 200 UNIT/ML soln     insulin pen needle (BD ENE U/F) 32G X 4 MM miscellaneous     metoprolol succinate ER (TOPROL-XL) 100 MG 24 hr tablet     metoprolol succinate ER (TOPROL-XL) 50 MG 24 hr tablet     multivitamin, therapeutic with minerals (MULTI-VITAMIN) TABS tablet     mycophenolic acid (GENERIC EQUIVALENT) 360 MG EC tablet     omeprazole (PRILOSEC) 20 MG DR capsule     oxyCODONE (ROXICODONE) 5 MG tablet     patiromer (VELTASSA) 8.4 g packet     predniSONE (DELTASONE) 2.5 MG tablet     tacrolimus (GENERIC EQUIVALENT) 1 MG capsule     ursodiol (ACTIGALL) 500 MG tablet     No current facility-administered medications for this visit.      /78 (BP Location: Right arm, Patient  Position: Sitting, Cuff Size: Adult Large)   Pulse 76   Temp 98.3  F (36.8  C) (Oral)   Resp 16   Ht 1.829 m (6')   Wt 107.4 kg (236 lb 12.8 oz)   SpO2 99%   BMI 32.12 kg/m      In general he looks well.  HEENT exam shows no scleral icterus or temporal muscle wasting.  His chest is clear.  His abdominal exam shows no increase in girth.  No masses or tenderness to palpation are present.  His liver is 10 cm in span without left lobe enlargement.  No spleen tip is palpable.  Extremity exam shows no edema.  Skin exam shows no suspicious lesions.  Neurologic exam is nonfocal.     His most recent laboratory tests show his white count is 5.0, hemoglobin 12.1, platelets are 77,000.  Sodium is 142, potassium 4.9, BUN is 39, creatinine 1.5.  AST is 21, ALT is 37, alkaline phosphatase 241.  Albumin is 3.8 with total protein of 6.8, total bilirubin is 0.8.      IMPRESSION:  Mr. Bhagat is more than 2 years status post liver transplantation and doing very well.  His kidney function is much improved from what it was in the early post-transplant period.  His alkaline phosphatase is as low as it has been and his liver enzymes look good.  I will not be making any change to his medical regimen.  He will only need to get blood tests done once a month and if it remains stable for the next 2 months we will go to every other month.  I will see him back in the clinic again in 6 months.      Thank you very much for allowing me to participate in the care of this patient.  If you have any questions regarding my recommendations, please do not hesitate to contact me.       Toñito Camejo MD      Professor of Medicine  [facility] [employer]      Executive Medical Director, Solid Organ Transplant Program  [facility]

## 2019-06-05 NOTE — LETTER
6/5/2019      RE: Camacho Bhagat  [address] [facility] [address]       I had the pleasure of seeing Camacho Bhagat for followup in the Liver Transplant Clinic at the [facility] on 06/05/2019.  Mr. Bhagat returns no more than 2 years status post liver transplantation for cirrhosis complicated by hepatocellular carcinoma.      He is doing well at this visit.  He denies any abdominal pain, itching or skin rash or fatigue.  He denies any increased abdominal girth or lower extremity edema.  He denies any fevers or chills, cough or shortness of breath.  He denies any nausea or vomiting.  He does have some intermittent diarrhea.  He has had a previous partial colectomy that may be contributing.  His appetite has been good.  His weight is up a few pounds.  There otherwise have been no other new events since he was last seen.     Current Outpatient Medications   Medication     amLODIPine (NORVASC) 10 MG tablet     cholecalciferol (VITAMIN D3) 1000 UNIT tablet     CIALIS 5 MG tablet     cyclobenzaprine (FLEXERIL) 10 MG tablet     fluticasone (FLONASE) 50 MCG/ACT nasal spray     furosemide (LASIX) 40 MG tablet     gabapentin (NEURONTIN) 300 MG capsule     Glucose Blood (BLOOD GLUCOSE TEST STRIPS) STRP     insulin glargine (LANTUS SOLOSTAR PEN) 100 UNIT/ML pen     Insulin Lispro (HUMALOG KWIKPEN) 200 UNIT/ML soln     insulin pen needle (BD ENE U/F) 32G X 4 MM miscellaneous     metoprolol succinate ER (TOPROL-XL) 100 MG 24 hr tablet     metoprolol succinate ER (TOPROL-XL) 50 MG 24 hr tablet     multivitamin, therapeutic with minerals (MULTI-VITAMIN) TABS tablet     mycophenolic acid (GENERIC EQUIVALENT) 360 MG EC tablet     omeprazole (PRILOSEC) 20 MG DR capsule     oxyCODONE (ROXICODONE) 5 MG tablet     patiromer (VELTASSA) 8.4 g packet     predniSONE (DELTASONE) 2.5 MG tablet     tacrolimus (GENERIC EQUIVALENT) 1 MG capsule     ursodiol (ACTIGALL) 500 MG tablet     No current  facility-administered medications for this visit.      /78 (BP Location: Right arm, Patient Position: Sitting, Cuff Size: Adult Large)   Pulse 76   Temp 98.3  F (36.8  C) (Oral)   Resp 16   Ht 1.829 m (6')   Wt 107.4 kg (236 lb 12.8 oz)   SpO2 99%   BMI 32.12 kg/m       In general he looks well.  HEENT exam shows no scleral icterus or temporal muscle wasting.  His chest is clear.  His abdominal exam shows no increase in girth.  No masses or tenderness to palpation are present.  His liver is 10 cm in span without left lobe enlargement.  No spleen tip is palpable.  Extremity exam shows no edema.  Skin exam shows no suspicious lesions.  Neurologic exam is nonfocal.     His most recent laboratory tests show his white count is 5.0, hemoglobin 12.1, platelets are 77,000.  Sodium is 142, potassium 4.9, BUN is 39, creatinine 1.5.  AST is 21, ALT is 37, alkaline phosphatase 241.  Albumin is 3.8 with total protein of 6.8, total bilirubin is 0.8.      IMPRESSION:  Mr. Bhagat is more than 2 years status post liver transplantation and doing very well.  His kidney function is much improved from what it was in the early post-transplant period.  His alkaline phosphatase is as low as it has been and his liver enzymes look good.  I will not be making any change to his medical regimen.  He will only need to get blood tests done once a month and if it remains stable for the next 2 months we will go to every other month.  I will see him back in the clinic again in 6 months.      Thank you very much for allowing me to participate in the care of this patient.  If you have any questions regarding my recommendations, please do not hesitate to contact me.       Toñito Camejo MD      Professor of Medicine  [employer] [facility]      Executive Medical Director, Solid Organ Transplant Program  [facility]    Toñito Camejo MD

## 2019-06-22 NOTE — CONSULTS
Nephrology Initial Consult  July 20, 2017      Camacho Bhagat MRN:[id number] YOB: 1965  Date of Admission:7/4/2017  Primary care provider: Shay Kirkpatrick  Requesting physician: Tip Zhang MD    ASSESSMENT AND RECOMMENDATIONS:     1. Hyperkalemia - Etiology likely combination of stopping Florinef, TF formulation and RBC in patient with CKD.    - Resume Florinef   - Give additional dose of lasix 40 mg IV today   - Continue renal TF formulation ( Nepro)    2. CKD3 - Etiology likely recurrent EJ, CNI, DM. Urine protein 1.5 g/gCr 6/17. TAC level 6.0. -140/   - Goals for treatment include A1c of 7%, /80, , ACE when renal function stabilizes.     3. HTN - Well controlled on Amlodipine 5 mg qd, Clonidine 0.2 mg bid.     4. Hypernatremia, chronic - Na 146. Currently on 60 ml free water per FT q hour. Currently NPO with rise in NA   - May need to switch to D5W if remains NPO    5. Volume status - He is likely hypo to euvolemic given large UO and stool output. Creat has risen slightly. He has been NPO for at least 24 hrs in preparation for colonoscopy. Had bowel prep. Getting Lasix for hyperkalemia.    - May have to give his fluids tomorrow if creat rises further. Will assess tomorrow    Recommendations were communicated to primary team directly    Seen and discussed with Dr. Amrita Cazares, NP   [phone]    REASON FOR CONSULT: Hyperkalemia    HISTORY OF PRESENT ILLNESS:  Camacho Bhagat is a 51 year old male s/p liver transplant on 3/17, admitted on 7/4/17 with neutropenic colitis, found to be non ischemic per Ex Lap with need to undergo colonoscopy to r/o CMV colitis. Unfortunately patient has developed hyperkalemia over the last several days necessitating correction prior to procedure. Correction of hyperkalemia is complicated by inability to give kayexalate in setting of colitis. In addition, patient has CKD with baseline creat 1.4-1.7 range giving him a GFR of ~ 40  ml/mn.     Review of the medical record shows K was in the 3-4 range until 7/15/17 then began rising into the 5's. On  K oriana to 5.9 following administration of a unit of RBCs on , discontinuation of Florinef on  and ongoing feedings with Peptimen. TF formula was changed to Nepro on , but patient has been NPO for procedure. Patient received Insulin/D50 early this morning and Lasix 80 mg IV at 0930 today with decline in K from 6.0  to 5.4. Creat oriana slightly to 1.6 from 1.4. UO is over 3 liters. -140/. He has had 9+ stools today.     PAST MEDICAL HISTORY:  CASTAÑEDA Cirrhosis s/p liver transplant 3/17  HTN  CKD  HLD  RA/Fibromyalgia  T2DM  Depression  RONNIE  GERD    Past Surgical History:   Procedure Laterality Date     BENCH LIVER N/A 3/4/2017    Procedure: BENCH LIVER;  Surgeon: Jovan Tran MD;  Location: [facility] OR     C NONSPECIFIC PROCEDURE      tracheostomy     C NONSPECIFIC PROCEDURE      repair of deviated septum     C NONSPECIFIC PROCEDURE      Rt knee arthroscopy     C TOTAL KNEE ARTHROPLASTY      Right knee arthroscopy     CHOLECYSTECTOMY       ESOPHAGOSCOPY, GASTROSCOPY, DUODENOSCOPY (EGD), COMBINED N/A 2016    Procedure: COMBINED ESOPHAGOSCOPY, GASTROSCOPY, DUODENOSCOPY (EGD), BIOPSY SINGLE OR MULTIPLE;  Surgeon: Trent Pederson MD;  Location: [facility]     LAPAROTOMY EXPLORATORY N/A 2017    Procedure: LAPAROTOMY EXPLORATORY;  Exploratory Laparotomy, washout;  Surgeon: Tip Zhang MD;  Location: [facility] OR     LAPAROTOMY EXPLORATORY N/A 2017    Procedure: LAPAROTOMY EXPLORATORY;  Exploratory Laparotomy, Washout with closure.;  Surgeon: Tip Zhang MD;  Location: [facility] OR     TRANSPLANT LIVER RECIPIENT  DONOR N/A 3/4/2017    Procedure: TRANSPLANT LIVER RECIPIENT  DONOR;  Surgeon: Jovan Tran MD;  Location: [facility] OR        MEDICATIONS:  PTA Meds  Prior to Admission medications    Medication Sig Last Dose Taking? Auth Provider    gabapentin (NEURONTIN) 300 MG capsule Take 1 capsule (300 mg) by mouth 3 times daily   Toñito Camejo MD   amLODIPine (NORVASC) 5 MG tablet Take 1 tablet (5 mg) by mouth daily   Toñito Camejo MD   tacrolimus (PROGRAF - GENERIC EQUIVALENT) 1 MG capsule Take 4capsules (4mg) in the morning and 3 capsules (3 mg) in the evening. Take every 12 hours.   Toñito Camejo MD   mycophenolate (CELLCEPT - GENERIC EQUIVALENT) 250 MG capsule Take 1 capsule (250 mg) by mouth every 12 hours   Toñito Camejo MD   sulfamethoxazole-trimethoprim (BACTRIM/SEPTRA) 400-80 MG per tablet Take 1 tablet on Monday and take 1 tablet on Thursday   Toñito Camejo MD   fludrocortisone (FLORINEF) 0.1 MG tablet Take 1 tablet (0.1 mg) by mouth daily For elevated potassium   Toñito Camejo MD   oxyCODONE (ROXICODONE) 10 MG IR tablet Take 1 tablet (10 mg) by mouth daily as needed for moderate to severe pain   Shay Kirkpatrick MD   clotrimazole (MYCELEX) 10 MG LOZG lozenge Place 1 lozenge (1 Beatrice) inside cheek 4 times daily   Toñito Camejo MD   Linagliptin (TRADJENTA PO) Take 5 mg by mouth   Reported, Patient   tadalafil (CIALIS) 5 MG tablet Take 1 tablet (5 mg) by mouth daily Never use with nitroglycerin, terazosin or doxazosin.   Toñito Rivas MD   tadalafil (CIALIS) 5 MG tablet Take 1 tablet (5 mg) by mouth daily Never use with nitroglycerin, terazosin or doxazosin.   Toñito Rivas MD   cyclobenzaprine (FLEXERIL) 10 MG tablet Take 1 tablet (10 mg) by mouth 3 times daily as needed for muscle spasms   Toñito Camejo MD   omeprazole (PRILOSEC) 20 MG CR capsule Take 1 capsule (20 mg) by mouth 2 times daily (before meals)   Toñito Camejo MD   magnesium oxide (MAG-OX) 400 MG tablet Take 1 tablet (400 mg) by mouth 2 times daily   Adonay Cross MD   lidocaine (LIDODERM) 5 % Patch Place 1 patch onto the skin every 24 hours  Patient not taking: Reported on 6/7/2017   Dora Elizabeth NP   LACTOBACILLUS EXTRA STRENGTH CAPS Take 1 capsule  by mouth 2 times daily   Dora Elizabeth NP   prochlorperazine (COMPAZINE) 5 MG tablet Take 1-2 tablets (5-10 mg) by mouth every 6 hours as needed for nausea or vomiting  Patient not taking: Reported on 6/7/2017   Dora Elizabeth NP   oxyCODONE (ROXICODONE) 5 MG IR tablet Take 1-2 tablets (5-10 mg) by mouth every 4 hours as needed for moderate to severe pain   Ada Driver PA-C   aspirin 325 MG tablet 1 tablet (325 mg) by Oral or Feeding Tube route daily   Ada Driver PA-C   insulin aspart (NOVOLOG PEN) 100 UNIT/ML injection DOSE:  1 units per 8 grams of carbohydrate. With meals and snacks. Only chart total amount of units given.  Do not give if pre-prandial glucose is less than 60 mg/dL.   Ada Driver PA-C   insulin glargine (LANTUS) 100 UNIT/ML injection Inject 45 Units Subcutaneous every 24 hours  Patient taking differently: Inject 50 Units Subcutaneous every 24 hours    Ada Driver PA-C   valGANciclovir (VALCYTE) 450 MG tablet Take 1 tablet (450 mg) by mouth daily   Ada Driver PA-C   multivitamin, therapeutic with minerals (THERA-VIT-M) TABS tablet Take 1 tablet by mouth daily   Ada Driver PA-C   ondansetron (ZOFRAN-ODT) 4 MG ODT tab Take 1 tablet (4 mg) by mouth every 8 hours as needed for nausea   Ada Driver PA-C   glucagon 1 MG SOLR injection Inject 1 mg Subcutaneous every 15 minutes as needed for low blood sugar (May repeat x 1 only)   Godwin Willson MD      Current Meds    fludrocortisone  100 mcg Oral BID     ganciclovir (CYTOVENE) intermittent infusion  2.5 mg/kg (Dosing Weight) Intravenous Q12H     insulin glargine  18 Units Subcutaneous Q24H     [START ON 7/21/2017] insulin isophane human  28 Units Subcutaneous QPM     insulin isophane human  16 Units Subcutaneous Once     insulin aspart  1-12 Units Subcutaneous Q4H     aspirin  80 mg Oral Daily     lidocaine  1-2 patch Transdermal Q24h    And     lidocaine   Transdermal Q24H    And      lidocaine   Transdermal Q8H     cyclobenzaprine  10 mg Oral At Bedtime     insulin aspart   Subcutaneous TID w/meals     tacrolimus  7 mg Oral BID IS     cloNIDine  0.2 mg Oral BID     amLODIPine  5 mg Oral Daily     multivitamins with minerals  15 mL Per Feeding Tube Daily     heparin  5,000 Units Subcutaneous Q8H     lidocaine (viscous)  5 mL Topical Once     pantoprazole  40 mg Oral or Feeding Tube Daily     sodium chloride (PF)  3 mL Intracatheter Q8H     Infusion Meds    NaCl Stopped (07/20/17 0430)     IV fluid REPLACEMENT ONLY 25 mL/hr at 07/20/17 0430       ALLERGIES:    Allergies   Allergen Reactions     Erythromycin GI Disturbance     Vioxx      Nausea, vomiting       REVIEW OF SYSTEMS:  Unable to obtain given AMS    SOCIAL HISTORY:   Social History     Social History     Marital status:      Spouse name: N/A     Number of children: 1     Years of education: N/A     Occupational History            Social History Main Topics     Smoking status: Former Smoker     Smokeless tobacco: Former User     Types: Chew     Quit date: 10/8/2015      Comment: Has used chewing tobacco since age 15 , chewed for 20yrs      Alcohol use No      Comment: last drink about a year ago (quit 2001)     Drug use: No     Sexual activity: Yes     Partners: Female     Other Topics Concern     Not on file     Social History Narrative    uSED TO BE      Back in school now         FAMILY MEDICAL HISTORY:   Family History   Problem Relation Age of Onset     DIABETES Father      Hypertension Father      Substance Abuse Father      Arthritis Mother      CANCER Mother      Thyroid     Thyroid Disease Mother      Other Cancer Mother      Colon Cancer No family hx of      Hyperlipidemia No family hx of      Coronary Artery Disease No family hx of      CEREBROVASCULAR DISEASE No family hx of      Breast Cancer No family hx of      Prostate Cancer No family hx of      PHYSICAL EXAM:   Temp  Avg:  99.5  F (37.5  C)  Min: 97.4  F (36.3  C)  Max: 103.3  F (39.6  C)  Arterial Line MAP (mmHg)  Av.6 mmHg  Min: 39 mmHg  Max: 281 mmHg  Arterial Line BP  Min: 41/40  Max: 284/281      Pulse  Av.2  Min: 84  Max: 101 Resp  Av.8  Min: 11  Max: 42  SpO2  Av.8 %  Min: 86 %  Max: 100 %  FiO2 (%)  Av.6 %  Min: 30 %  Max: 60 %    CVP (mmHg): 6 mmHg  /76 (BP Location: Right arm)  Pulse 86  Temp 98.7  F (37.1  C) (Oral)  Resp 20  Ht 1.829 m (6')  Wt 107 kg (235 lb 12.8 oz)  SpO2 93%  BMI 31.98 kg/m2   Date 17 0700 - 17 0659   Shift 5944-5455 6388-1250 2770-8729 24 Hour Total   I  N  T  A  K  E   Enteral 0   0    Shift Total  (mL/kg) 0  (0)   0  (0)   O  U  T  P  U  T   Urine 1150 400  1550    Shift Total  (mL/kg) 1150  (10.75) 400  (3.74)  1550  (14.49)   Weight (kg) 106.96 106.96 106.96 106.96        Admit Weight: 94.2 kg (207 lb 11.2 oz) (SCALE 2)     GENERAL APPEARANCE: lethargic but rousable  EYES: no scleral icterus, pupils equal  HENT: FT present  Pulmonary: lungs clear to auscultation anterior exam. On supplemental oxygen. Breathing is non labored.   CV: tachy    - Edema- none  GI: soft, non distended. Incision line intact   : Crowe present  NEURO: altered    LABS:   CMP  Recent Labs  Lab 17  1427 17  1108 17  0645 17  0307  17  0621 17  0624 17  0630  17  0326  17  0349  17  2343   NA  --   --  146*  --   --  144 142 142  < > 148*  < > 144  --   --    POTASSIUM 5.5* 5.4* 5.5* 5.4*  < > 5.9* 5.2 5.1  < > 5.1  < > 4.4  --  4.7   CHLORIDE  --   --  116*  --   --  114* 113* 114*  < > 123*  < > 116*  --   --    CO2  --   --  25  --   --  26 24 21  < > 22  < > 20  --   --    ANIONGAP  --   --  5  --   --  4 5 7  < > 3  < > 8  --   --    GLC  --   --  97  --   --  146* 131* 239*  < > 128*  < > 187*  --   --    BUN  --   --  70*  --   --  67* 63* 66*  < > 71*  < > 84*  --   --    CR  --   --  1.67*  --   --  1.48*  1.46* 1.55*  < > 1.66*  < > 1.74*  --   --    GFRESTIMATED  --   --  43*  --   --  50* 51* 47*  < > 44*  < > 41*  --   --    GFRESTBLACK  --   --  52*  --   --  60* 61 57*  < > 53*  < > 50*  --   --    JACOB  --   --  8.1*  --   --  7.7* 7.7* 7.8*  < > 7.8*  < > 8.0*  --   --    MAG  --   --   --   --   --   --   --   --   --  2.4*  --  2.2  --  2.1   PHOS  --   --   --   --   --   --   --   --   --  3.8  --  3.7  --  4.1   PROTTOTAL  --   --   --   --   --  5.9* 5.4* 5.5*  --  5.3*  --  5.6*  < >  --    ALBUMIN  --   --   --   --   --  2.0* 1.9* 1.9*  --  1.8*  --  2.0*  < >  --    BILITOTAL  --   --   --   --   --  0.7 0.6 1.0  --  0.3  --  0.5  < >  --    ALKPHOS  --   --   --   --   --  201* 171* 144  --  134  --  171*  < >  --    AST  --   --   --   --   --  60* 88* 101*  --  72*  --  68*  < >  --    ALT  --   --   --   --   --  50 61 58  --  44  --  36  < >  --    < > = values in this interval not displayed.  CBC  Recent Labs  Lab 07/20/17  0645 07/19/17  0621 07/18/17  1517 07/18/17  0624 07/17/17  0630   HGB 7.4* 7.8* 6.5* 6.8* 7.7*   WBC 4.6 5.6  --  6.3 5.9   RBC 2.62* 2.81*  --  2.44* 2.74*   HCT 23.4* 25.0*  --  21.2* 24.4*   MCV 89 89  --  87 89   MCH 28.2 27.8  --  27.9 28.1   MCHC 31.6 31.2*  --  32.1 31.6   RDW 14.8 14.7  --  14.4 14.7   * 145*  --  151 160     INR  Recent Labs  Lab 07/17/17  0630   INR 1.19*     ABGNo lab results found in last 7 days.   URINE STUDIES  Recent Labs   Lab Test  07/19/17   1150  07/11/17   1105  07/06/17   1441  06/06/17   1605   COLOR  Yellow  Yellow  Yellow  Yellow   APPEARANCE  Slightly Cloudy  Clear  Cloudy  Slightly Cloudy   URINEGLC  Negative  Negative  Negative  50*   URINEBILI  Negative  Negative  Negative  Negative   URINEKETONE  Negative  Negative  Negative  Negative   SG  1.009  1.009  1.014  1.015   UBLD  Moderate*  Negative  Moderate*  Negative   URINEPH  5.0  5.0  5.0  5.0   PROTEIN  10*  10*  30*  100*   NITRITE  Negative  Negative  Negative   Negative   LEUKEST  Negative  Negative  Trace*  Negative   RBCU  6*  <1  >182*  2   WBCU  2  <1  9*  1     Recent Labs   Lab Test  06/14/17   1508  04/11/16   1345  02/08/16   1234  04/11/11   1201   [facility]  1.55*  0.41*  0.33*  <0.08     PTH  No lab results found.  IRON STUDIES  Recent Labs   Lab Test  02/08/16   1144   IRON  93   FEB  230*   IRONSAT  41       Cinda Cazares NP  I,Raffi Crowe,  evaluated this patient with Cinda Cazares NP on 07/20/17, as part of a shared visit.     I personally reviewed major complaints, physical findings, investigations, medications and the overall management plan.        My key findings: non dialysis-dependent CKD, disproportionate hyperkalemia.    Key management decisions made by me: loop diuretic, re-institute fludrocortisone.       Discharged

## 2019-06-25 ENCOUNTER — HOSPITAL ENCOUNTER (OUTPATIENT)
Dept: MRI IMAGING | Facility: CLINIC | Age: 54
End: 2019-06-25
Attending: INTERNAL MEDICINE
Payer: COMMERCIAL

## 2019-06-25 ENCOUNTER — HOSPITAL ENCOUNTER (OUTPATIENT)
Dept: CT IMAGING | Facility: CLINIC | Age: 54
Discharge: HOME | End: 2019-06-25
Attending: INTERNAL MEDICINE | Admitting: INTERNAL MEDICINE
Payer: COMMERCIAL

## 2019-06-25 DIAGNOSIS — C22.0 HEPATOCELLULAR CARCINOMA (H): ICD-10-CM

## 2019-06-25 PROCEDURE — A9585 GADOBUTROL INJECTION: HCPCS | Performed by: INTERNAL MEDICINE

## 2019-06-25 PROCEDURE — 71250 CT THORAX DX C-: CPT

## 2019-06-25 PROCEDURE — 74183 MRI ABD W/O CNTR FLWD CNTR: CPT

## 2019-06-25 PROCEDURE — 40000141 ZZH STATISTIC PERIPHERAL IV START W/O US GUIDANCE

## 2019-06-25 PROCEDURE — 25500064 ZZH RX 255 OP 636: Performed by: INTERNAL MEDICINE

## 2019-06-25 RX ORDER — GADOBUTROL 604.72 MG/ML
10 INJECTION INTRAVENOUS ONCE
Status: COMPLETED | OUTPATIENT
Start: 2019-06-25 | End: 2019-06-25

## 2019-06-25 RX ADMIN — GADOBUTROL 10 ML: 604.72 INJECTION INTRAVENOUS at 13:57

## 2019-07-02 NOTE — ED NOTES
"Pt presents with hyperkalemia after presenting to his clinic. Pt was in to get his normal lab work done and found to have potassium at 7. Pt denies any symptoms and states he \"feels great.\" Pt alert, oriented x3. ABCs intact, past hx of liver transplant.  " I discussed the patient with the resident, reviewed their findings, assessment, and plan, and agree with the content of their clinical note for today.    Briefly, patient is a 55 y/o female, who presents for follow-up.    Chronic Cough - Did not try any of the recommendations we made at the last appointment. States her cough is similar. Got worse when she went outside today to come to this appointment. Will be going to Dr. Barakat at the end of this month. Noted that she and  are not okay with reassurance and continue to request further testing, however are also not reassured by this. Of note, are on disability due to this.    Hearing Impairment - Started one month ago on her L side. Feels like there is an echo. Has R-sided hearing loss. Would like to see an audiologist.    R Foot Pain - Dropped a pyrex container on her foot. Is sore and worried that her foot is broken. Not swollen and denies pain. On exam, there is a 2cm abrasion that appears to be healing. No changes in sensation or strength. Continue to monitor. Patient is insistent that she needs an x-ray.    HTN - Noted elevated today, but patient unable to be still for BP check. Continue on current losartan-HCTZ.      Please see resident note for more information.    Lima Quinn MD, MPH  [facility]

## 2019-07-08 ENCOUNTER — HOSPITAL ENCOUNTER (OUTPATIENT)
Dept: LAB | Facility: CLINIC | Age: 54
Discharge: HOME | End: 2019-07-08
Attending: INTERNAL MEDICINE | Admitting: INTERNAL MEDICINE
Payer: COMMERCIAL

## 2019-07-08 DIAGNOSIS — E87.5 HYPERKALEMIA: ICD-10-CM

## 2019-07-08 DIAGNOSIS — Z94.4 LIVER REPLACED BY TRANSPLANT (H): ICD-10-CM

## 2019-07-08 LAB
ALBUMIN SERPL-MCNC: 3.7 G/DL (ref 3.4–5)
ALP SERPL-CCNC: 348 U/L (ref 40–150)
ALT SERPL W P-5'-P-CCNC: 39 U/L (ref 0–70)
ANION GAP SERPL CALCULATED.3IONS-SCNC: 4 MMOL/L (ref 3–14)
AST SERPL W P-5'-P-CCNC: 27 U/L (ref 0–45)
BILIRUB DIRECT SERPL-MCNC: 0.3 MG/DL (ref 0–0.2)
BILIRUB SERPL-MCNC: 0.8 MG/DL (ref 0.2–1.3)
BUN SERPL-MCNC: 41 MG/DL (ref 7–30)
CALCIUM SERPL-MCNC: 9.1 MG/DL (ref 8.5–10.1)
CHLORIDE SERPL-SCNC: 109 MMOL/L (ref 94–109)
CO2 SERPL-SCNC: 25 MMOL/L (ref 20–32)
CREAT SERPL-MCNC: 1.46 MG/DL (ref 0.66–1.25)
ERYTHROCYTE [DISTWIDTH] IN BLOOD BY AUTOMATED COUNT: 13.2 % (ref 10–15)
GFR SERPL CREATININE-BSD FRML MDRD: 53 ML/MIN/{1.73_M2}
GLUCOSE SERPL-MCNC: 183 MG/DL (ref 70–99)
HCT VFR BLD AUTO: 38.5 % (ref 40–53)
HGB BLD-MCNC: 12.9 G/DL (ref 13.3–17.7)
MCH RBC QN AUTO: 31.2 PG (ref 26.5–33)
MCHC RBC AUTO-ENTMCNC: 33.5 G/DL (ref 31.5–36.5)
MCV RBC AUTO: 93 FL (ref 78–100)
PHOSPHATE SERPL-MCNC: 3.3 MG/DL (ref 2.5–4.5)
PLATELET # BLD AUTO: 116 10E9/L (ref 150–450)
POTASSIUM SERPL-SCNC: 5.1 MMOL/L (ref 3.4–5.3)
PROT SERPL-MCNC: 7.1 G/DL (ref 6.8–8.8)
RBC # BLD AUTO: 4.13 10E12/L (ref 4.4–5.9)
SODIUM SERPL-SCNC: 138 MMOL/L (ref 133–144)
WBC # BLD AUTO: 6.2 10E9/L (ref 4–11)

## 2019-07-08 PROCEDURE — 84155 ASSAY OF PROTEIN SERUM: CPT

## 2019-07-08 PROCEDURE — 36415 COLL VENOUS BLD VENIPUNCTURE: CPT | Performed by: INTERNAL MEDICINE

## 2019-07-08 PROCEDURE — 84460 ALANINE AMINO (ALT) (SGPT): CPT

## 2019-07-08 PROCEDURE — 82248 BILIRUBIN DIRECT: CPT

## 2019-07-08 PROCEDURE — 84075 ASSAY ALKALINE PHOSPHATASE: CPT

## 2019-07-08 PROCEDURE — 82247 BILIRUBIN TOTAL: CPT

## 2019-07-08 PROCEDURE — 36415 COLL VENOUS BLD VENIPUNCTURE: CPT

## 2019-07-08 PROCEDURE — 84450 TRANSFERASE (AST) (SGOT): CPT

## 2019-07-08 PROCEDURE — 80197 ASSAY OF TACROLIMUS: CPT | Performed by: INTERNAL MEDICINE

## 2019-07-08 PROCEDURE — 85027 COMPLETE CBC AUTOMATED: CPT

## 2019-07-08 PROCEDURE — 80069 RENAL FUNCTION PANEL: CPT

## 2019-07-09 LAB
TACROLIMUS BLD-MCNC: 6.5 UG/L (ref 5–15)
TME LAST DOSE: NORMAL H

## 2019-07-09 NOTE — PROGRESS NOTES
[facility]   Hil[facility]  Daily Progress Note  February 25, 2017    Camcaho Bhagat MRN# [id number]   Age: 51 year old YOB: 1965     Date of Admission: 2/21/2017  Date of Service: 02/25/2017    Interval History   No acute events overnight. Resting comfortably in bed. Afebrile and stable vitals signs.  Crowe catheter removed yesterday.  Denies any issues with retention or incomplete emptying. Bowel movements at goal with lactulose.     Medications     Medications and allergies reviewed in [facility].  Changes are reflected in the assessment and plan.      Physical Exam   /62  Pulse 84  Temp 97.5  F (36.4  C) (Oral)  Resp 16  Wt 104.5 kg (230 lb 6.4 oz)  SpO2 100%  BMI 31.25 kg/m2    Wt Readings from Last 1 Encounters:   02/25/17 104.5 kg (230 lb 6.4 oz)    Body mass index is 31.25 kg/(m^2).     General: Resting comfortably in hospital bed.  Alert, and oriented x 3.    HEENT: EOMI.  Scleral icterus.  PERRL.  MM slightly dry.    Neck/Thyroid:  supple  Lungs: CTAB.    Heart:  RRR, systolic murmur over the upper sternal border.    Abdomen: Obese, soft, non tender. NA bowel sounds.     /Rectal:  deferred  MSK:  normal muscle bulk and tone  Skin:  warm and dry, no rashes  Extremities: Trace bilateral lower extremity edema.    Neurological: Grossly non focal.      Drains and Tubes: None.      Intravascular Access and Device: Peripheral IV Right lower forearm.      Intake/Output Summary (Last 24 hours) at 02/25/17 1049  Last data filed at 02/25/17 0830   Gross per 24 hour   Intake              450 ml   Output             1375 ml   Net             -925 ml     Data     Labs & Studies of Note: I personally reviewed the labs in [facility].  Cr now improving to 1.3.   Hyponatremia to 124.  Elevated blood sugars overnight in the 300s.      Imaging:   No results found for this or any previous visit (from the past 24 hour(s)).    Main Plans for Today   Continue diuresis today with additional dose of Lasix IV  20 mg.    - Post void residual.    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 Now admitted again due to concern for worsening encephalopathy and infection.       Hepatic Encephalopathy(resolved): Likely 2/2 to medication non compliance.Unable to obtain diagnostic paracentesis on admission due to very limited fluid pocket following therapeutic tap prior to arrival. Started empirically on Zosyn due to concern for SBP and ?aspiration pneumonia on CXR. Head CT on admission was negative for any acute intracranial process.   - Diagnostic para 02/23 negative for SBP.  Low suspicion for infectious etiology as the trigger for HE.    - Lactulose 3 times daily - PO. Titrate for 3-5 stools per day.   - Rifaximin 550 mg bid  - PRN Lactulose PO vs enemas per mental status.   - Nursing to continue HE protocol.       Cryptogenic cirrhosis/CASTAÑEDA  HCC S/P TACE 09/2016:   Recurrent Ascites/SBP:   Unclear if this is an infectious process vs. Non compliance as listed above. Continues to have worsening bilirubin.  - D/C Zosyn 02/24 as there is no clear sign of infection. Switch to Cipro ppx 500 mg daily.   - Diagnostic paracentesis 02/23 - negative for SBP  - Remainder of infectious work up. UA no infectious signs. CXR ? Aspiration pneumonia - however no symptoms suggestive of pneumonia. Blood cultures with no growth to date.   - Hepatology consulted - appreciate recs.   - Transplant candidacy- awaiting listing for transplant due to high MELD. Right heart cath 2/24 - with increased right and left sided filling pressures - however no contraindication to transplant aside from diuresis now for volume overload.  - MRI abdomen w/wo contrast pending improvement of creatinine to  evaluate for recurrence of HCC       Acute Kidney Injury:   Hyponatremia(hypervolemic):   Hyperkalemia:   : Pt with recurrent issues with hyperkalemia - thought to be related to his ACEI and Aldactone - however he has been discontinued on this medication on recent discharges. K was 6 on admission - now improved.   - EJ likely 2/2 to intravascular volume depletion. 6.1 liter paracentesis on 2/21.   - RHC on 2/24 with increased filling pressures.    - Lasix 20 mg IV additional dose given.  Continue home Lasix 40 mg PO.    - Cr now improving with diuresis.  - Strict I/Os. Daily weights.      Urinary retention  H/O Scrotal edema - Has been recent evaluated by urology. Intermittently has needed a vazquez catheter due to retention on recent admissions. Recurrent issue again requiring vazquez catheter placement on admit. Evaluated for a scrotal mass on last admit - signs of possible inflammatory changes without a drainable fluid collection.   - Voiding without difficulty after vazquez removal.    - Post void residual today to ensure he is not retaining.        Type II DM: SSI. Lantus 60 units daily.  Bloods sugars have been elevated - due to decreasing lantus for procedure.  Resume home insulin.  -Started insulin aspart subq 2 units/15 g carbs  -Changed to high dose ISS  -Hypoglycemia protocol      ------------------------------------------------------------------------------------------------------------------  Prophylaxis:   -GI: PPI  -DVT: None due to bleeding risk.       FEN: Mod carbohydrate diet -Nutrition consulted to help with dietary rec given recurrent hyperkalemia.   Consults: Hepatology, dietician, Cardiology.   Family: Mother and wife updated at bedside on 2/23.  Further updates PRN  Code status: Full code.   Disposition: Pending further transplant work up, improvement of kidney function, and further eval of worsening liver disease.   =========================================================  Patient was discussed  and evaluated with Dr. Mauro Martin MD   IM PGY2            No

## 2019-07-16 ENCOUNTER — MYC MEDICAL ADVICE (OUTPATIENT)
Dept: INTERNAL MEDICINE | Facility: CLINIC | Age: 54
End: 2019-07-16

## 2019-07-16 NOTE — TELEPHONE ENCOUNTER
Responded to MC, routed to MD. Tayler Jim Paramedic on 7/16/2019 at 4:25 PM   Responded to patient, routed update to MD. Tayler Jim Paramedic on 7/17/2019 at 9:17 AM

## 2019-08-05 ENCOUNTER — TELEPHONE (OUTPATIENT)
Dept: NEPHROLOGY | Facility: CLINIC | Age: 54
End: 2019-08-05

## 2019-08-06 ENCOUNTER — HOSPITAL ENCOUNTER (OUTPATIENT)
Dept: LAB | Facility: CLINIC | Age: 54
Discharge: HOME | End: 2019-08-06
Attending: INTERNAL MEDICINE
Payer: COMMERCIAL

## 2019-08-06 DIAGNOSIS — Z94.4 LIVER REPLACED BY TRANSPLANT (H): ICD-10-CM

## 2019-08-06 DIAGNOSIS — E87.5 HYPERKALEMIA: ICD-10-CM

## 2019-08-06 DIAGNOSIS — N18.30 CKD (CHRONIC KIDNEY DISEASE) STAGE 3, GFR 30-59 ML/MIN (H): ICD-10-CM

## 2019-08-06 DIAGNOSIS — E55.9 VITAMIN D DEFICIENCY: ICD-10-CM

## 2019-08-06 LAB
ALBUMIN SERPL-MCNC: 3.9 G/DL (ref 3.4–5)
ALP SERPL-CCNC: 232 U/L (ref 40–150)
ALT SERPL W P-5'-P-CCNC: 38 U/L (ref 0–70)
ANION GAP SERPL CALCULATED.3IONS-SCNC: 4 MMOL/L (ref 3–14)
AST SERPL W P-5'-P-CCNC: 25 U/L (ref 0–45)
BILIRUB DIRECT SERPL-MCNC: 0.3 MG/DL (ref 0–0.2)
BILIRUB SERPL-MCNC: 0.8 MG/DL (ref 0.2–1.3)
BUN SERPL-MCNC: 34 MG/DL (ref 7–30)
CALCIUM SERPL-MCNC: 9.2 MG/DL (ref 8.5–10.1)
CHLORIDE SERPL-SCNC: 113 MMOL/L (ref 94–109)
CO2 SERPL-SCNC: 27 MMOL/L (ref 20–32)
CREAT SERPL-MCNC: 1.19 MG/DL (ref 0.66–1.25)
CREAT UR-MCNC: 75 MG/DL
ERYTHROCYTE [DISTWIDTH] IN BLOOD BY AUTOMATED COUNT: 14.5 % (ref 10–15)
GFR SERPL CREATININE-BSD FRML MDRD: 68 ML/MIN/{1.73_M2}
GLUCOSE SERPL-MCNC: 82 MG/DL (ref 70–99)
HCT VFR BLD AUTO: 37.3 % (ref 40–53)
HGB BLD-MCNC: 12.4 G/DL (ref 13.3–17.7)
MCH RBC QN AUTO: 31.3 PG (ref 26.5–33)
MCHC RBC AUTO-ENTMCNC: 33.2 G/DL (ref 31.5–36.5)
MCV RBC AUTO: 94 FL (ref 78–100)
PHOSPHATE SERPL-MCNC: 2.3 MG/DL (ref 2.5–4.5)
PLATELET # BLD AUTO: 91 10E9/L (ref 150–450)
POTASSIUM SERPL-SCNC: 4.6 MMOL/L (ref 3.4–5.3)
PROT SERPL-MCNC: 7.3 G/DL (ref 6.8–8.8)
PROT UR-MCNC: 0.72 G/L
PROT/CREAT 24H UR: 0.96 G/G CR (ref 0–0.2)
PTH-INTACT SERPL-MCNC: 22 PG/ML (ref 18–80)
RBC # BLD AUTO: 3.96 10E12/L (ref 4.4–5.9)
SODIUM SERPL-SCNC: 144 MMOL/L (ref 133–144)
TACROLIMUS BLD-MCNC: 5.6 UG/L (ref 5–15)
TME LAST DOSE: NORMAL H
WBC # BLD AUTO: 5.6 10E9/L (ref 4–11)

## 2019-08-06 PROCEDURE — 84450 TRANSFERASE (AST) (SGOT): CPT

## 2019-08-06 PROCEDURE — 84155 ASSAY OF PROTEIN SERUM: CPT

## 2019-08-06 PROCEDURE — 80069 RENAL FUNCTION PANEL: CPT

## 2019-08-06 PROCEDURE — 85027 COMPLETE CBC AUTOMATED: CPT

## 2019-08-06 PROCEDURE — 82248 BILIRUBIN DIRECT: CPT

## 2019-08-06 PROCEDURE — 84156 ASSAY OF PROTEIN URINE: CPT

## 2019-08-06 PROCEDURE — 80197 ASSAY OF TACROLIMUS: CPT | Performed by: INTERNAL MEDICINE

## 2019-08-06 PROCEDURE — 82306 VITAMIN D 25 HYDROXY: CPT

## 2019-08-06 PROCEDURE — 82247 BILIRUBIN TOTAL: CPT

## 2019-08-06 PROCEDURE — 80076 HEPATIC FUNCTION PANEL: CPT

## 2019-08-06 PROCEDURE — 36415 COLL VENOUS BLD VENIPUNCTURE: CPT

## 2019-08-06 PROCEDURE — 83970 ASSAY OF PARATHORMONE: CPT

## 2019-08-06 PROCEDURE — 84460 ALANINE AMINO (ALT) (SGPT): CPT

## 2019-08-06 PROCEDURE — 36415 COLL VENOUS BLD VENIPUNCTURE: CPT | Performed by: INTERNAL MEDICINE

## 2019-08-06 PROCEDURE — 84075 ASSAY ALKALINE PHOSPHATASE: CPT

## 2019-08-07 ENCOUNTER — OFFICE VISIT (OUTPATIENT)
Dept: NEPHROLOGY | Facility: CLINIC | Age: 54
End: 2019-08-07
Payer: COMMERCIAL

## 2019-08-07 VITALS
DIASTOLIC BLOOD PRESSURE: 82 MMHG | WEIGHT: 243.3 LBS | HEART RATE: 71 BPM | TEMPERATURE: 98.8 F | OXYGEN SATURATION: 98 % | BODY MASS INDEX: 33 KG/M2 | SYSTOLIC BLOOD PRESSURE: 136 MMHG

## 2019-08-07 DIAGNOSIS — N18.2 CKD (CHRONIC KIDNEY DISEASE) STAGE 2, GFR 60-89 ML/MIN: Primary | ICD-10-CM

## 2019-08-07 DIAGNOSIS — E87.5 HYPERKALEMIA: ICD-10-CM

## 2019-08-07 DIAGNOSIS — E55.9 VITAMIN D DEFICIENCY: ICD-10-CM

## 2019-08-07 DIAGNOSIS — I10 ESSENTIAL HYPERTENSION: ICD-10-CM

## 2019-08-07 DIAGNOSIS — N18.2 ANEMIA OF CHRONIC RENAL FAILURE, STAGE 2 (MILD): ICD-10-CM

## 2019-08-07 DIAGNOSIS — D63.1 ANEMIA OF CHRONIC RENAL FAILURE, STAGE 2 (MILD): ICD-10-CM

## 2019-08-07 LAB — DEPRECATED CALCIDIOL+CALCIFEROL SERPL-MC: 43 UG/L (ref 20–75)

## 2019-08-07 PROCEDURE — G0463 HOSPITAL OUTPT CLINIC VISIT: HCPCS | Mod: ZF

## 2019-08-07 RX ORDER — METOPROLOL SUCCINATE 200 MG/1
200 TABLET, EXTENDED RELEASE ORAL DAILY
Qty: 90 TABLET | Refills: 3 | Status: SHIPPED | OUTPATIENT
Start: 2019-08-07 | End: 2020-06-15

## 2019-08-07 ASSESSMENT — PAIN SCALES - GENERAL: PAINLEVEL: NO PAIN (0)

## 2019-08-07 NOTE — LETTER
8/7/2019      RE: Camacho Bhagat  [address]  [facility] [address]       Nephrology Clinic Visit 8/7/19    Assessment and Plan:       1. CKD2 w/mild proteinuria -  Improved! Creat 1.1, eGFR 68 ml/mn. UPCR now rising since off Losartan, up to 0.9 g/gCr today from 0.4 g/gCr  last visit.   Baseline has improved to low 1 range.   Etiology of CKD likely multifactorial; DM, recurrent EJ, CNI.    - Had been intolerant of ACE/ARB previously given problems with hyperkalemia assoc with Tacrolimus, but retrial when TAC level was <0.3 did not result in Hyperkalemia. In fact Valtessa had been discontinued because he was becoming hypokalemic.    - Patient was restarted on ARB in 4/18 for unexplained nephrotic range proteinuria following ostomy takedown with improvement in UPCR, but then developed mild acute rejection and Tac dose was increased resulting in hyperkalemia. He was restarted on Valtassa with improvement in hyperkalemia and Losartan was discontinued again in 3/19   - Potassium has been controlled since 4/19 off Losartan, but now his UPCR is beginning to rise again. However, blood pressures are still not at goal.    - Will work on controlling his blood pressure and if UPCR doesn't improve at that point will have to consider our options   - Does not use NSAIDs.    -  Will get labs in 3 wks including UPCR   - A1c goal is 7%. HgA1c 6.1% Nov 2018      2. HTN - Uncontrolled. Home blood pressures 150's/80. 136-147/82-83. No edema. No dyspnea or CP. He is becoming obese which is another contributing factor to his rising blood pressures.    Current antihypertensive regimen:      Lasix 40 mg bid     Amlodipine 10 mg qd      Toprol  mg every day    - Increase Toprol XL to 200 mg every day   - Continue to monitor blood pressures    - Will call next week to assess b/p control.       3. Hyperkalemia assoc with Tac/ARB - K 4.6 and has been < 5.0 since stopping Losartan in March 2019.    - Currently on Valtassa  16.8 g/day. Will decrease to 8.4 g/day. Have labs rechecked in 3 wks    - He tries to modify his diet w/respect to K foods      4. Volume status - Euvolemic. No edema/dyspnea. Weight 243# today. Was 220# in 12/18. Blood pressures uncontrolled. Weight gain is not fluid related. He is gaining body weight   - Continue Lasix 40 mg bid.       5. Acid base - No acute concerns. Metabolic acidosis resolved following ileostomy takedown. Bicarb 27       6. BMD - Ca 9.2, Phos 2.3, albumin 3.9   - Vitamin D 43, PTH 2 2 (7/19)    - Continue Vit D 1000 U qd      7.Anemia -Hgb 12.4.    - No recent iron studies   - Had colonoscopy 2017 (poor study), Ileoscopy 8/17 - nml   - Not on iron and has not needed DIPAK      8. Liver transplant IS: Tacrolimus, MMF, Pred. Tac level  5.6      9. Dispo- RCT  6 months for f/u      Reason for Visit:  CKD2/HTN follow up        HPI:  Mr Bhagat is a 54 yo male with CKD2, HTN, Chronic hyperkalemia, Liver transplant, in today for CKD/HTN followup. Last seen in clinic by me on  4/3/19. Since our last visit patient was asked to increase his Toprol XL to 150 mg every day. Potasium levels have been ~ 5.0 since 4/19. Baseline creat low 1's.       Interval Hx:     No hospital admissions.       ROS:  Feels like a 25 yo again!  Home blood pressures in the 150/80 range  Appetite is good  No edema.   Has OA joint pain, but has been able to continue with normal daily activities.   No side effects from Toprol.   Denies dyspnea,  CP, abdominal pain. Voiding w/o difficulty.          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 Former smoker,   by  profession. Exercising regularly.    Family Hx:   Family History   Problem Relation Age of Onset     Diabetes Father      Hypertension Father      Substance Abuse Father      Arthritis Mother      Thyroid Cancer Mother         Survivor!     Cervical Cancer Maternal Grandmother      Cerebrovascular Disease Maternal Grandfather      No Known Problems Paternal Grandmother      Prostate Cancer Paternal Grandfather      Colon Cancer No family hx of      Hyperlipidemia No family hx of      Coronary Artery Disease No family hx of      Breast Cancer No family hx of        Allergies:  Allergies   Allergen Reactions     Erythromycin GI Disturbance     Vioxx      Nausea, vomiting       Medications:  Current Outpatient Medications   Medication Sig     amLODIPine (NORVASC) 10 MG tablet Take 1 tablet (10 mg) by mouth daily     cholecalciferol (VITAMIN D3) 1000 UNIT tablet Take 1 tablet (1,000 Units) by mouth daily (Patient taking differently: Take 1,000 Units by mouth every morning )     cyclobenzaprine (FLEXERIL) 10 MG tablet Take 1 tablet (10 mg) by mouth 3 times daily as needed for muscle spasms     fluticasone (FLONASE) 50 MCG/ACT nasal spray Spray 1 spray into both nostrils daily     furosemide (LASIX) 40 MG tablet Take 1 tablet (40 mg) by mouth 2 times daily     gabapentin (NEURONTIN) 300 MG capsule Take 1 capsule (300 mg) by mouth 3 times daily     Glucose Blood (BLOOD GLUCOSE TEST STRIPS) STRP 1 strip by Lancet route 3 times daily     insulin glargine (LANTUS SOLOSTAR PEN) 100 UNIT/ML pen Inject 52 Units Subcutaneous every morning     Insulin Lispro (HUMALOG KWIKPEN) 200 UNIT/ML soln Use one unit per 3 grams carbohydrates for all meals and snacks. Total daily dose up to 80 U.     insulin pen needle (BD ENE U/F) 32G X 4 MM miscellaneous Use 1 pen needle 4 times daily or as directed.     metoprolol succinate ER (TOPROL-XL) 200 MG 24 hr tablet Take 1 tablet (200 mg) by mouth daily Take a total of 150 mg daily      multivitamin, therapeutic with minerals (MULTI-VITAMIN) TABS tablet Take 1 tablet by mouth every morning     mycophenolic acid (GENERIC EQUIVALENT) 360 MG EC tablet Take 2 tablets (720 mg) by mouth every 12 hours     omeprazole (PRILOSEC) 20 MG DR capsule Take 1 capsule (20 mg) by mouth daily     oxyCODONE (ROXICODONE) 5 MG tablet Take 1 tablet (5 mg) by mouth every 4 hours as needed for pain maximum 6 tablet(s) per day     patiromer (VELTASSA) 8.4 g packet Take 8.4 g by mouth daily     predniSONE (DELTASONE) 2.5 MG tablet Take 2.5 mg by mouth daily.     tacrolimus (GENERIC EQUIVALENT) 1 MG capsule Take 4 capsules (4 mg) by mouth every 12 hours (Patient taking differently: Take 5 mg by mouth every 12 hours )     ursodiol (ACTIGALL) 500 MG tablet Take 1 tablet (500 mg) by mouth 2 times daily     CIALIS 5 MG tablet Take 5 mg by mouth as needed      No current facility-administered medications for this visit.       Vitals:  /82   Pulse 71   Temp 98.8  F (37.1  C)   Wt 110.4 kg (243 lb 4.8 oz)   SpO2 98%   BMI 33.00 kg/m       Exam:    Gen: Well groomed, pleasant  CARDIAC: RRR  LUNGS: CTA  ABDOMEN: soft, NT  EXT: No edema  NEURO: Alert, oriented.    LABS:   CMP  Recent Labs   Lab Test 08/06/19  1234 07/08/19  1233 05/29/19  1215 05/10/19  1216    138 142 138   POTASSIUM 4.6 5.1 4.9 5.0   CHLORIDE 113* 109 112* 108   CO2 27 25 25 24   ANIONGAP 4 4 5 6   GLC 82 183* 84 103*   BUN 34* 41* 39* 54*   CR 1.19 1.46* 1.50* 1.60*   GFRESTIMATED 68 53* 52* 48*   GFRESTBLACK 79 62 60* 56*   JACOB 9.2 9.1 9.0 8.8     Recent Labs   Lab Test 08/06/19  1234 07/08/19  1233 05/29/19  1215 05/10/19  1216   BILITOTAL 0.8 0.8 0.8 0.8   ALKPHOS 232* 348* 241* 270*   ALT 38 39 37 37   AST 25 27 21 25     CBC  Recent Labs   Lab Test 08/06/19  1234 07/08/19  1233 05/29/19  1215 05/10/19  1216   HGB 12.4* 12.9* 12.1* 12.4*   WBC 5.6 6.2 5.0 5.5   RBC 3.96* 4.13* 3.89* 3.93*   HCT 37.3* 38.5* 36.5* 38.2*   MCV 94 93 94 97   MCH  31.3 31.2 31.1 31.6   MCHC 33.2 33.5 33.2 32.5   RDW 14.5 13.2 13.6 13.9   PLT 91* 116* 77* 97*     URINE STUDIES  Recent Labs   Lab Test 03/27/19  1155 04/11/18  1446 02/26/18  1115 01/29/18  1235   COLOR Yellow Yellow Yellow Yellow   APPEARANCE Clear Slightly Cloudy Clear Clear   URINEGLC 70* >499* >499* 150*   URINEBILI Negative Negative Negative Negative   URINEKETONE Negative Negative Negative Negative   SG 1.014 1.010 1.010 1.013   UBLD Negative Small* Negative Small*   URINEPH 5.0 5.0 5.0 6.0   PROTEIN 30* 100* 100* >499*   NITRITE Negative Negative Negative Negative   LEUKEST Negative Negative Negative Negative   RBCU 1 3* 2 4*   WBCU 0 <1 1 4*     Recent Labs   Lab Test 08/06/19  1230 03/27/19  1155 11/27/18  1110 09/18/18  1200   UTPG 0.96* 0.41* 0.31* 0.28*     PTH  Recent Labs   Lab Test 08/06/19  1234 10/10/18  1059 09/18/18  1212   PTHI 22 22 28     IRON STUDIES  Recent Labs   Lab Test 07/10/18  1022 03/06/18  1029 02/05/18  1108   IRON 166 143 44   * 228* 166*   IRONSAT 73* 63* 26   NELY 1,338* 1,362* 1,014*       Cinda Cazares, JUANITO

## 2019-08-07 NOTE — PATIENT INSTRUCTIONS
1. Increase Toprol to 200 mg daily  2. Decrease Valtessa to one packet/ day  3. Get labs (renal profile and urine) in 3 wks

## 2019-08-07 NOTE — NURSING NOTE
Chief Complaint   Patient presents with     RECHECK     Follow up      Vital signs:  Temp: 98.8  F (37.1  C)   BP: 136/82 Pulse: 71     SpO2: 98 %       Weight: 110.4 kg (243 lb 4.8 oz)  Estimated body mass index is 33 kg/m  as calculated from the following:    Height as of 6/5/19: 1.829 m (6').    Weight as of this encounter: 110.4 kg (243 lb 4.8 oz).        Marcella Rowe CMA

## 2019-08-07 NOTE — PROGRESS NOTES
Nephrology Clinic Visit 8/7/19    Assessment and Plan:       1. CKD2 w/mild proteinuria - Improved! Creat 1.1, eGFR 68 ml/mn. UPCR now rising since off Losartan, up to 0.9 g/gCr today from 0.4 g/gCr last visit.   Baseline has improved to low 1 range.   Etiology of CKD likely multifactorial; DM, recurrent EJ, CNI.    - Had been intolerant of ACE/ARB previously given problems with hyperkalemia assoc with Tacrolimus, but retrial when TAC level was <0.3 did not result in Hyperkalemia. In fact Valtessa had been discontinued because he was becoming hypokalemic.    - Patient was restarted on ARB in 4/18 for unexplained nephrotic range proteinuria following ostomy takedown with improvement in UPCR, but then developed mild acute rejection and Tac dose was increased resulting in hyperkalemia. He was restarted on Valtassa with improvement in hyperkalemia and Losartan was discontinued again in 3/19   - Potassium has been controlled since 4/19 off Losartan, but now his UPCR is beginning to rise again. However, blood pressures are still not at goal.    - Will work on controlling his blood pressure and if UPCR doesn't improve at that point will have to consider our options   - Does not use NSAIDs.    - Will get labs in 3 wks including UPCR   - A1c goal is 7%. HgA1c 6.1% Nov 2018      2. HTN - Uncontrolled. Home blood pressures 150's/80. 136-147/82-83. No edema. No dyspnea or CP. He is becoming obese which is another contributing factor to his rising blood pressures.    Current antihypertensive regimen:      Lasix 40 mg bid     Amlodipine 10 mg qd      Toprol  mg every day    - Increase Toprol XL to 200 mg every day   - Continue to monitor blood pressures    - Will call next week to assess b/p control.       3. Hyperkalemia assoc with Tac/ARB - K 4.6 and has been < 5.0 since stopping Losartan in March 2019.    - Currently on Valtassa 16.8 g/day. Will decrease to 8.4 g/day. Have labs rechecked in 3 wks    - He tries to modify  his diet w/respect to K foods      4. Volume status - Euvolemic. No edema/dyspnea. Weight 243# today. Was 220# in 12/18. Blood pressures uncontrolled. Weight gain is not fluid related. He is gaining body weight   - Continue Lasix 40 mg bid.       5. Acid base - No acute concerns. Metabolic acidosis resolved following ileostomy takedown. Bicarb 27       6. BMD - Ca 9.2, Phos 2.3, albumin 3.9   - Vitamin D 43, PTH 22 (7/19)    - Continue Vit D 1000 U qd      7.Anemia -Hgb 12.4.    - No recent iron studies   - Had colonoscopy 2017 (poor study), Ileoscopy 8/17 - nml   - Not on iron and has not needed DIPAK      8. Liver transplant IS: Tacrolimus, MMF, Pred. Tac level 5.6      9. Dispo- RCT 6 months for f/u      Reason for Visit:  CKD2/HTN follow up        HPI:  Mr Bhagat is a 52 yo male with CKD2, HTN, Chronic hyperkalemia, Liver transplant, in today for CKD/HTN followup. Last seen in clinic by me on 4/3/19. Since our last visit patient was asked to increase his Toprol XL to 150 mg every day. Potasium levels have been ~ 5.0 since 4/19. Baseline creat low 1's.       Interval Hx:     No hospital admissions.       ROS:  Feels like a 23 yo again!  Home blood pressures in the 150/80 range  Appetite is good  No edema.   Has OA joint pain, but has been able to continue with normal daily activities.   No side effects from Toprol.   Denies dyspnea,  CP, abdominal pain. Voiding w/o difficulty.          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 Former smoker,   by profession. Exercising regularly.    Family Hx:   Family History   Problem Relation Age of Onset      Diabetes Father      Hypertension Father      Substance Abuse Father      Arthritis Mother      Thyroid Cancer Mother         Survivor!     Cervical Cancer Maternal Grandmother      Cerebrovascular Disease Maternal Grandfather      No Known Problems Paternal Grandmother      Prostate Cancer Paternal Grandfather      Colon Cancer No family hx of      Hyperlipidemia No family hx of      Coronary Artery Disease No family hx of      Breast Cancer No family hx of        Allergies:  Allergies   Allergen Reactions     Erythromycin GI Disturbance     Vioxx      Nausea, vomiting       Medications:  Current Outpatient Medications   Medication Sig     amLODIPine (NORVASC) 10 MG tablet Take 1 tablet (10 mg) by mouth daily     cholecalciferol (VITAMIN D3) 1000 UNIT tablet Take 1 tablet (1,000 Units) by mouth daily (Patient taking differently: Take 1,000 Units by mouth every morning )     cyclobenzaprine (FLEXERIL) 10 MG tablet Take 1 tablet (10 mg) by mouth 3 times daily as needed for muscle spasms     fluticasone (FLONASE) 50 MCG/ACT nasal spray Spray 1 spray into both nostrils daily     furosemide (LASIX) 40 MG tablet Take 1 tablet (40 mg) by mouth 2 times daily     gabapentin (NEURONTIN) 300 MG capsule Take 1 capsule (300 mg) by mouth 3 times daily     Glucose Blood (BLOOD GLUCOSE TEST STRIPS) STRP 1 strip by Lancet route 3 times daily     insulin glargine (LANTUS SOLOSTAR PEN) 100 UNIT/ML pen Inject 52 Units Subcutaneous every morning     Insulin Lispro (HUMALOG KWIKPEN) 200 UNIT/ML soln Use one unit per 3 grams carbohydrates for all meals and snacks. Total daily dose up to 80 U.     insulin pen needle (BD ENE U/F) 32G X 4 MM miscellaneous Use 1 pen needle 4 times daily or as directed.     metoprolol succinate ER (TOPROL-XL) 200 MG 24 hr tablet Take 1 tablet (200 mg) by mouth daily Take a total of 150 mg daily     multivitamin, therapeutic with minerals (MULTI-VITAMIN) TABS tablet Take 1 tablet by mouth every morning      mycophenolic acid (GENERIC EQUIVALENT) 360 MG EC tablet Take 2 tablets (720 mg) by mouth every 12 hours     omeprazole (PRILOSEC) 20 MG DR capsule Take 1 capsule (20 mg) by mouth daily     oxyCODONE (ROXICODONE) 5 MG tablet Take 1 tablet (5 mg) by mouth every 4 hours as needed for pain maximum 6 tablet(s) per day     patiromer (VELTASSA) 8.4 g packet Take 8.4 g by mouth daily     predniSONE (DELTASONE) 2.5 MG tablet Take 2.5 mg by mouth daily.     tacrolimus (GENERIC EQUIVALENT) 1 MG capsule Take 4 capsules (4 mg) by mouth every 12 hours (Patient taking differently: Take 5 mg by mouth every 12 hours )     ursodiol (ACTIGALL) 500 MG tablet Take 1 tablet (500 mg) by mouth 2 times daily     CIALIS 5 MG tablet Take 5 mg by mouth as needed      No current facility-administered medications for this visit.       Vitals:  /82   Pulse 71   Temp 98.8  F (37.1  C)   Wt 110.4 kg (243 lb 4.8 oz)   SpO2 98%   BMI 33.00 kg/m      Exam:    Gen: Well groomed, pleasant  CARDIAC: RRR  LUNGS: CTA  ABDOMEN: soft, NT  EXT: No edema  NEURO: Alert, oriented.    LABS:   CMP  Recent Labs   Lab Test 08/06/19  1234 07/08/19  1233 05/29/19  1215 05/10/19  1216    138 142 138   POTASSIUM 4.6 5.1 4.9 5.0   CHLORIDE 113* 109 112* 108   CO2 27 25 25 24   ANIONGAP 4 4 5 6   GLC 82 183* 84 103*   BUN 34* 41* 39* 54*   CR 1.19 1.46* 1.50* 1.60*   GFRESTIMATED 68 53* 52* 48*   GFRESTBLACK 79 62 60* 56*   JACOB 9.2 9.1 9.0 8.8     Recent Labs   Lab Test 08/06/19  1234 07/08/19  1233 05/29/19  1215 05/10/19  1216   BILITOTAL 0.8 0.8 0.8 0.8   ALKPHOS 232* 348* 241* 270*   ALT 38 39 37 37   AST 25 27 21 25     CBC  Recent Labs   Lab Test 08/06/19  1234 07/08/19  1233 05/29/19  1215 05/10/19  1216   HGB 12.4* 12.9* 12.1* 12.4*   WBC 5.6 6.2 5.0 5.5   RBC 3.96* 4.13* 3.89* 3.93*   HCT 37.3* 38.5* 36.5* 38.2*   MCV 94 93 94 97   MCH 31.3 31.2 31.1 31.6   MCHC 33.2 33.5 33.2 32.5   RDW 14.5 13.2 13.6 13.9   PLT 91* 116* 77* 97*     URINE  STUDIES  Recent Labs   Lab Test 03/27/19  1155 04/11/18  1446 02/26/18  1115 01/29/18  1235   COLOR Yellow Yellow Yellow Yellow   APPEARANCE Clear Slightly Cloudy Clear Clear   URINEGLC 70* >499* >499* 150*   URINEBILI Negative Negative Negative Negative   URINEKETONE Negative Negative Negative Negative   SG 1.014 1.010 1.010 1.013   UBLD Negative Small* Negative Small*   URINEPH 5.0 5.0 5.0 6.0   PROTEIN 30* 100* 100* >499*   NITRITE Negative Negative Negative Negative   LEUKEST Negative Negative Negative Negative   RBCU 1 3* 2 4*   WBCU 0 <1 1 4*     Recent Labs   Lab Test 08/06/19  1230 03/27/19  1155 11/27/18  1110 09/18/18  1200   UTPG 0.96* 0.41* 0.31* 0.28*     PTH  Recent Labs   Lab Test 08/06/19  1234 10/10/18  1059 09/18/18  1212   PTHI 22 22 28     IRON STUDIES  Recent Labs   Lab Test 07/10/18  1022 03/06/18  1029 02/05/18  1108   IRON 166 143 44   * 228* 166*   IRONSAT 73* 63* 26   NELY 1,338* 1,362* 1,014*       Cinda Cazares, NP

## 2019-08-10 ENCOUNTER — MYC REFILL (OUTPATIENT)
Dept: NEPHROLOGY | Facility: CLINIC | Age: 54
End: 2019-08-10

## 2019-08-10 DIAGNOSIS — I10 HTN (HYPERTENSION): ICD-10-CM

## 2019-08-10 RX ORDER — AMLODIPINE BESYLATE 10 MG/1
10 TABLET ORAL DAILY
Qty: 90 TABLET | Refills: 3 | Status: SHIPPED | OUTPATIENT
Start: 2019-08-10 | End: 2020-10-26

## 2019-08-15 ENCOUNTER — MYC MEDICAL ADVICE (OUTPATIENT)
Dept: NEPHROLOGY | Facility: CLINIC | Age: 54
End: 2019-08-15

## 2019-08-19 NOTE — TELEPHONE ENCOUNTER
Spoke with Camacho. He wasn't able to get metoprolol until a week after the appointment, so he'll check back next week with an update.    Brenda Falcon RN

## 2019-08-26 ENCOUNTER — CARE COORDINATION (OUTPATIENT)
Dept: NEPHROLOGY | Facility: CLINIC | Age: 54
End: 2019-08-26

## 2019-08-27 ENCOUNTER — MYC REFILL (OUTPATIENT)
Dept: TRANSPLANT | Facility: CLINIC | Age: 54
End: 2019-08-27

## 2019-08-27 DIAGNOSIS — Z94.4 HISTORY OF LIVER TRANSPLANT (H): ICD-10-CM

## 2019-08-27 DIAGNOSIS — Z79.60 LONG-TERM USE OF IMMUNOSUPPRESSANT MEDICATION: ICD-10-CM

## 2019-08-28 RX ORDER — MYCOPHENOLIC ACID 360 MG/1
720 TABLET, DELAYED RELEASE ORAL EVERY 12 HOURS
Qty: 360 TABLET | Refills: 3 | Status: SHIPPED | OUTPATIENT
Start: 2019-08-28 | End: 2020-06-15

## 2019-09-06 ENCOUNTER — HOSPITAL ENCOUNTER (OUTPATIENT)
Dept: LAB | Facility: CLINIC | Age: 54
Discharge: HOME | End: 2019-09-06
Payer: COMMERCIAL

## 2019-09-06 DIAGNOSIS — N18.2 CKD (CHRONIC KIDNEY DISEASE) STAGE 2, GFR 60-89 ML/MIN: ICD-10-CM

## 2019-09-06 LAB
ALBUMIN SERPL-MCNC: 4.1 G/DL (ref 3.4–5)
ANION GAP SERPL CALCULATED.3IONS-SCNC: 4 MMOL/L (ref 3–14)
BUN SERPL-MCNC: 52 MG/DL (ref 7–30)
CALCIUM SERPL-MCNC: 9 MG/DL (ref 8.5–10.1)
CHLORIDE SERPL-SCNC: 108 MMOL/L (ref 94–109)
CO2 SERPL-SCNC: 25 MMOL/L (ref 20–32)
CREAT SERPL-MCNC: 1.47 MG/DL (ref 0.66–1.25)
GFR SERPL CREATININE-BSD FRML MDRD: 53 ML/MIN/{1.73_M2}
GLUCOSE SERPL-MCNC: 196 MG/DL (ref 70–99)
PHOSPHATE SERPL-MCNC: 3.1 MG/DL (ref 2.5–4.5)
POTASSIUM SERPL-SCNC: 5.2 MMOL/L (ref 3.4–5.3)
SODIUM SERPL-SCNC: 137 MMOL/L (ref 133–144)

## 2019-09-06 PROCEDURE — 36415 COLL VENOUS BLD VENIPUNCTURE: CPT

## 2019-09-06 PROCEDURE — 80069 RENAL FUNCTION PANEL: CPT

## 2019-09-12 DIAGNOSIS — Z94.4 LIVER REPLACED BY TRANSPLANT (H): ICD-10-CM

## 2019-09-12 DIAGNOSIS — Z13.220 LIPID SCREENING: ICD-10-CM

## 2019-10-01 ENCOUNTER — HEALTH MAINTENANCE LETTER (OUTPATIENT)
Age: 54
End: 2019-10-01

## 2019-10-02 ENCOUNTER — TELEPHONE (OUTPATIENT)
Dept: TRANSPLANT | Facility: CLINIC | Age: 54
End: 2019-10-02

## 2019-10-02 ENCOUNTER — HOSPITAL ENCOUNTER (OUTPATIENT)
Dept: LAB | Facility: CLINIC | Age: 54
Discharge: HOME | End: 2019-10-02
Attending: INTERNAL MEDICINE
Payer: COMMERCIAL

## 2019-10-02 DIAGNOSIS — Z94.4 LIVER REPLACED BY TRANSPLANT (H): ICD-10-CM

## 2019-10-02 DIAGNOSIS — N18.2 CKD (CHRONIC KIDNEY DISEASE) STAGE 2, GFR 60-89 ML/MIN: ICD-10-CM

## 2019-10-02 DIAGNOSIS — Z13.220 LIPID SCREENING: ICD-10-CM

## 2019-10-02 LAB
ALBUMIN SERPL-MCNC: 3.9 G/DL (ref 3.4–5)
ALP SERPL-CCNC: 223 U/L (ref 40–150)
ALT SERPL W P-5'-P-CCNC: 40 U/L (ref 0–70)
ANION GAP SERPL CALCULATED.3IONS-SCNC: 3 MMOL/L (ref 3–14)
AST SERPL W P-5'-P-CCNC: 28 U/L (ref 0–45)
BILIRUB DIRECT SERPL-MCNC: 0.3 MG/DL (ref 0–0.2)
BILIRUB SERPL-MCNC: 1 MG/DL (ref 0.2–1.3)
BUN SERPL-MCNC: 50 MG/DL (ref 7–30)
CALCIUM SERPL-MCNC: 9.5 MG/DL (ref 8.5–10.1)
CHLORIDE SERPL-SCNC: 110 MMOL/L (ref 94–109)
CO2 SERPL-SCNC: 26 MMOL/L (ref 20–32)
CREAT SERPL-MCNC: 1.53 MG/DL (ref 0.66–1.25)
ERYTHROCYTE [DISTWIDTH] IN BLOOD BY AUTOMATED COUNT: 13.9 % (ref 10–15)
GFR SERPL CREATININE-BSD FRML MDRD: 50 ML/MIN/{1.73_M2}
GLUCOSE SERPL-MCNC: 228 MG/DL (ref 70–99)
HCT VFR BLD AUTO: 36.3 % (ref 40–53)
HGB BLD-MCNC: 12.5 G/DL (ref 13.3–17.7)
MCH RBC QN AUTO: 32 PG (ref 26.5–33)
MCHC RBC AUTO-ENTMCNC: 34.4 G/DL (ref 31.5–36.5)
MCV RBC AUTO: 93 FL (ref 78–100)
PHOSPHATE SERPL-MCNC: 2.9 MG/DL (ref 2.5–4.5)
PLATELET # BLD AUTO: 91 10E9/L (ref 150–450)
POTASSIUM SERPL-SCNC: 5.8 MMOL/L (ref 3.4–5.3)
PROT SERPL-MCNC: 7 G/DL (ref 6.8–8.8)
RBC # BLD AUTO: 3.91 10E12/L (ref 4.4–5.9)
SODIUM SERPL-SCNC: 139 MMOL/L (ref 133–144)
TACROLIMUS BLD-MCNC: 6 UG/L (ref 5–15)
TME LAST DOSE: NORMAL H
WBC # BLD AUTO: 5.6 10E9/L (ref 4–11)

## 2019-10-02 PROCEDURE — 82248 BILIRUBIN DIRECT: CPT

## 2019-10-02 PROCEDURE — 80069 RENAL FUNCTION PANEL: CPT

## 2019-10-02 PROCEDURE — 36415 COLL VENOUS BLD VENIPUNCTURE: CPT

## 2019-10-02 PROCEDURE — 80197 ASSAY OF TACROLIMUS: CPT | Performed by: INTERNAL MEDICINE

## 2019-10-02 PROCEDURE — 82247 BILIRUBIN TOTAL: CPT

## 2019-10-02 PROCEDURE — 84075 ASSAY ALKALINE PHOSPHATASE: CPT

## 2019-10-02 PROCEDURE — 84450 TRANSFERASE (AST) (SGOT): CPT

## 2019-10-02 PROCEDURE — 84155 ASSAY OF PROTEIN SERUM: CPT

## 2019-10-02 PROCEDURE — 84460 ALANINE AMINO (ALT) (SGPT): CPT

## 2019-10-02 PROCEDURE — 85027 COMPLETE CBC AUTOMATED: CPT

## 2019-10-02 NOTE — TELEPHONE ENCOUNTER
Attempted to reach Camacho to discuss 10/2/19 lab results, specifically elevated potassium. Message left to call me back.

## 2019-10-03 ENCOUNTER — TELEPHONE (OUTPATIENT)
Dept: TRANSPLANT | Facility: CLINIC | Age: 54
End: 2019-10-03

## 2019-10-03 NOTE — TELEPHONE ENCOUNTER
Camacho returned my call. He is aware his potassium was elevated on most recent lab results 10/2/19 and states that historically he has run even higher and feels fine, does not wish to have re checked unless his nephrology team instructs him to do so.

## 2019-10-07 ENCOUNTER — DOCUMENTATION ONLY (OUTPATIENT)
Dept: CARE COORDINATION | Facility: CLINIC | Age: 54
End: 2019-10-07

## 2019-10-15 PROBLEM — T86.41 LIVER TRANSPLANT REJECTION (H): Status: ACTIVE | Noted: 2018-06-01

## 2019-10-16 ENCOUNTER — TELEPHONE (OUTPATIENT)
Dept: TRANSPLANT | Facility: CLINIC | Age: 54
End: 2019-10-16

## 2019-10-16 ENCOUNTER — TELEPHONE (OUTPATIENT)
Dept: INTERNAL MEDICINE | Facility: CLINIC | Age: 54
End: 2019-10-16

## 2019-10-16 ENCOUNTER — MYC MEDICAL ADVICE (OUTPATIENT)
Dept: INTERNAL MEDICINE | Facility: CLINIC | Age: 54
End: 2019-10-16

## 2019-10-16 DIAGNOSIS — M15.0 PRIMARY OSTEOARTHRITIS INVOLVING MULTIPLE JOINTS: ICD-10-CM

## 2019-10-16 DIAGNOSIS — M06.9 RHEUMATOID ARTHRITIS INVOLVING MULTIPLE SITES, UNSPECIFIED RHEUMATOID FACTOR PRESENCE: ICD-10-CM

## 2019-10-16 NOTE — TELEPHONE ENCOUNTER
Clinic supervisor responded to patient via mychart and schedule appointment for patient. Felicity Conroy LPN 10/16/2019 4:23 PM

## 2019-10-16 NOTE — TELEPHONE ENCOUNTER
Camacho called to ask about being scheduled for a flu shot. He was instructed to go through his PCP for this. Camacho stated he attempted to get in for a nurse visit for the vaccine only but they won't schedule him because he hasn't seen his provider (Dr. Kirkpatrick) for over a year. I recommended Camacho get in to see his PCP as it is important to follow regularly anyway but he can't get in to see them sooner than November. The clinic won't administer the vaccine before that appt. I suggested Camacho get at a local [facility] or [facility] but he prefers to get within our system. I also suggested he contact our outpatient pharmacy as they also administer flu vaccines. Contact information given.

## 2019-10-16 NOTE — TELEPHONE ENCOUNTER
Health Call Center    Phone Message    May a detailed message be left on voicemail: yes    Reason for Call: Other: Pt is asking to speak to a nurse about his flu shot appt that was cancelled.  Pt was scheduled on 10/18.  Reason for cancellation: Error (scheduling error, patient needs to be seen in pcc by a pcp within 12mo of his last visit, since his last visit was 9/17/18 and today being 10/16/19 that month in between just means he has to see a pcp first before being added to nurse schd Left)    Action Taken: Message routed to:  [facility] ([facility] PCC

## 2019-10-16 NOTE — TELEPHONE ENCOUNTER
Reason For Call:        Chief Complaint   Patient presents with     Refill Request                 Medication Name, Dose and Monthly Quantity:   oxyCODONE (ROXICODONE) 5 MG tablet    Diagnosis requiring opiates:   Primary osteoarthritis involving multiple joints      Problem List Updated:   Yes     Opioid Agreement On File - [facility] PAIN CONTRACT ID# [id number]:       Last Urine Drug Screen (at least once every 12 months) Date:     Unexpected Results:        [place]  Data Reviewed (at least once every 3 months) Date:   06/03/19  Unexpected Results:         Last Fill Date:   06/05/2019  Next Fill Date:   10/16/2019  Last Visit with PCP:    09/17/18    Future Visits with PCP:    Nothing scheduled  Processing:     RO BONNER CMA at 12:52 PM on 10/16/2019.

## 2019-10-16 NOTE — TELEPHONE ENCOUNTER
Clinic supervisor called patient to schedule flu shot. Per clinic protocol patient is able to schedule appointment for a flu shot only, if they have not been seen in clinic within the last 12 months. Felicity Conroy LPN 10/16/2019 3:19 PM

## 2019-10-18 ENCOUNTER — ALLIED HEALTH/NURSE VISIT (OUTPATIENT)
Dept: INTERNAL MEDICINE | Facility: CLINIC | Age: 54
End: 2019-10-18
Payer: COMMERCIAL

## 2019-10-18 DIAGNOSIS — Z23 NEED FOR VACCINATION: Primary | ICD-10-CM

## 2019-10-18 NOTE — PROGRESS NOTES
Camacho Bhagat comes into clinic today at the request of Dr. Kirkpatrick Ordering Provider for Med Injection only flu shot.      Camacho Bhagat      1.  Has the patient received the information for the influenza vaccine? YES    2.  Does the patient have any of the following contraindications?     Allergy to eggs? No     Allergy to a component of the influenza vaccine? No     Serious reaction to previous influenza vaccines? No     Paralyzed by Guillain-[place] syndrome? No     Currently sick today? No         3.  Verified patient name and  prior to injection. Yes  4.  The patient was instructed to wait 15 minutes before leaving the building in the event of an allergic reaction: YES        Vaccination given by MANNIE Rosales  .      Recorded by MANNIE Rosales          This service provided today was under the supervising provider of the day Dr. Gibson, who was available if needed.    Jordi Morgan CMA

## 2019-10-19 RX ORDER — OXYCODONE HYDROCHLORIDE 5 MG/1
5 TABLET ORAL EVERY 4 HOURS PRN
Qty: 30 TABLET | Refills: 0 | Status: SHIPPED | OUTPATIENT
Start: 2019-10-19 | End: 2020-01-14

## 2019-10-29 ENCOUNTER — DOCUMENTATION ONLY (OUTPATIENT)
Dept: CARE COORDINATION | Facility: CLINIC | Age: 54
End: 2019-10-29

## 2019-10-30 ENCOUNTER — HEALTH MAINTENANCE LETTER (OUTPATIENT)
Age: 54
End: 2019-10-30

## 2019-10-30 ENCOUNTER — TELEPHONE (OUTPATIENT)
Dept: NEPHROLOGY | Facility: CLINIC | Age: 54
End: 2019-10-30

## 2019-10-30 ENCOUNTER — TELEPHONE (OUTPATIENT)
Dept: TRANSPLANT | Facility: CLINIC | Age: 54
End: 2019-10-30

## 2019-10-30 NOTE — TELEPHONE ENCOUNTER
Patient Call: Insurance  Route to LPN  Reason for Call: Prior authorization needed for the pt's refill patiromer (VELTASSA) 8.4 g packet    Pt states he will be out in 4 days 11/4/19 Monday he will not have his medication.      Please connect with the pt when available

## 2019-10-30 NOTE — TELEPHONE ENCOUNTER
Prior Authorization Retail Medication Request    Medication/Dose: Veltassa 8.4 g daily   ICD code (if different than what is on RX):  Hyperkalemia E87.5  Previously Tried and Failed:  See nephrology notes  Rationale:  See nephrology notes- been on Veltassa for since October 2017 to control high potassium levels     Insurance Name:  [facility]   Insurance ID:  [id number]      Pharmacy Information: see chart    Patient reports he only have 4 days of medication left.     Quang Conteh, RN, BAN  Nephrology RN Care Coordinator     Phone: [phone]

## 2019-10-30 NOTE — TELEPHONE ENCOUNTER
[facility] Call Center    Phone Message    May a detailed message be left on voicemail: yes    Reason for Call: Other: Patient called said he need a Prior Auth for his medication, he will be out in 4 days and need to speak to someone  to get it authorized pt's insurance wont cover it the medication is VELTASSA.     Action Taken: Other: [facility]

## 2019-10-30 NOTE — TELEPHONE ENCOUNTER
An encounter was created to initiate PA for [place]. Will close as a duplicate.    Brenda Falcon RN

## 2019-10-31 NOTE — TELEPHONE ENCOUNTER
Patient called clinic, spoke with Cele Mancilla. She updated him that the expedited PA has been started.    Brenda Falcon RN

## 2019-10-31 NOTE — TELEPHONE ENCOUNTER
Central Prior Authorization Team   Phone: [phone]    Called pharmacy to get insurance info  Bin: [id number]  PCN: [id number]  Group: [id number]  ID: [id number]    PA Initiation    Medication: Veltassa 8.4 g daily   Insurance Company: [employer] - Phone [phone] Fax [phone]  Pharmacy Filling the Rx: [id number] - [place], [place] - [address]  Filling Pharmacy Phone: [phone]  Filling Pharmacy Fax:    Start Date: 10/31/2019

## 2019-11-01 NOTE — TELEPHONE ENCOUNTER
Prior Authorization Approval - notified via phone per luther Amezquita rep     Authorization Effective Date: 11/1/2019  Authorization Expiration Date: 11/1/2021  Medication: Veltassa 8.4 g daily - P/A APPROVED  Approved Dose/Quantity: 90  Reference #: [id number]   Insurance Company: [employer] - Phone [phone] Fax [phone]  Expected CoPay:       CoPay Card Available:      Foundation Assistance Needed:    Which Pharmacy is filling the prescription (Not needed for infusion/clinic administered): [id number] - [place], [place] - [address]  Pharmacy Notified: Yes - spoke to Adore  Patient Notified:

## 2019-11-04 NOTE — TELEPHONE ENCOUNTER
Spoke with pt about starting Diflucan and Myfortic. He asked if he could possibly switch to everolimus d/t elevated creatinine. Pt cannot start d/t open fistula and necrotic tissue, explained to him everolimus delays wound healing and is not a good option for him at this time. Instructed pt to  the new medications today and start them this evening, no exceptions. Pt verbalized understanding and has no further questions. Scripts to be called in to pharmacy.   Cheek-To-Nose Interpolation Flap Text: A decision was made to reconstruct the defect utilizing an interpolation axial flap and a staged reconstruction.  A telfa template was made of the defect.  This telfa template was then used to outline the Cheek-To-Nose Interpolation flap.  The donor area for the pedicle flap was then injected with anesthesia.  The flap was excised through the skin and subcutaneous tissue down to the layer of the underlying musculature.  The interpolation flap was carefully excised within this deep plane to maintain its blood supply.  The edges of the donor site were undermined.   The donor site was closed in a primary fashion.  The pedicle was then rotated into position and sutured.  Once the tube was sutured into place, adequate blood supply was confirmed with blanching and refill.  The pedicle was then wrapped with xeroform gauze and dressed appropriately with a telfa and gauze bandage to ensure continued blood supply and protect the attached pedicle.

## 2019-11-06 DIAGNOSIS — Z94.4 LIVER REPLACED BY TRANSPLANT (H): ICD-10-CM

## 2019-11-06 RX ORDER — URSODIOL 500 MG/1
500 TABLET, FILM COATED ORAL 2 TIMES DAILY
Qty: 180 TABLET | Refills: 3 | Status: SHIPPED | OUTPATIENT
Start: 2019-11-06 | End: 2020-11-09

## 2019-11-07 DIAGNOSIS — Z79.4 TYPE 2 DIABETES MELLITUS WITH DIABETIC POLYNEUROPATHY, WITH LONG-TERM CURRENT USE OF INSULIN (H): ICD-10-CM

## 2019-11-07 DIAGNOSIS — E11.42 TYPE 2 DIABETES MELLITUS WITH DIABETIC POLYNEUROPATHY, WITH LONG-TERM CURRENT USE OF INSULIN (H): ICD-10-CM

## 2019-11-07 ASSESSMENT — ENCOUNTER SYMPTOMS
BACK PAIN: 1
MYALGIAS: 0
STIFFNESS: 1
MUSCLE CRAMPS: 0
JOINT SWELLING: 0
NECK PAIN: 1
MUSCLE WEAKNESS: 0
ARTHRALGIAS: 1

## 2019-11-08 RX ORDER — INSULIN GLARGINE 100 [IU]/ML
INJECTION, SOLUTION SUBCUTANEOUS
Qty: 60 ML | Refills: 3 | Status: SHIPPED | OUTPATIENT
Start: 2019-11-08 | End: 2019-11-18

## 2019-11-08 NOTE — TELEPHONE ENCOUNTER
LANTUS SOLOSTAR 100 UNIT/ML soln      Last Written Prescription Date:  12-26-18  Last Fill Quantity: 48 ml,   # refills: 3  Last Office Visit : 5-13-19  Future Office visit:  11-18-19    Routing refill request to provider for review/approval because:  Insulin - refilled per clinic

## 2019-11-18 ENCOUNTER — OFFICE VISIT (OUTPATIENT)
Dept: ENDOCRINOLOGY | Facility: CLINIC | Age: 54
End: 2019-11-18
Payer: COMMERCIAL

## 2019-11-18 VITALS
HEART RATE: 72 BPM | WEIGHT: 235 LBS | HEIGHT: 72 IN | DIASTOLIC BLOOD PRESSURE: 83 MMHG | BODY MASS INDEX: 31.83 KG/M2 | SYSTOLIC BLOOD PRESSURE: 181 MMHG

## 2019-11-18 DIAGNOSIS — Z79.4 TYPE 2 DIABETES MELLITUS WITH DIABETIC NEPHROPATHY, WITH LONG-TERM CURRENT USE OF INSULIN (H): Primary | ICD-10-CM

## 2019-11-18 DIAGNOSIS — I15.1 HYPERTENSION SECONDARY TO OTHER RENAL DISORDERS: ICD-10-CM

## 2019-11-18 DIAGNOSIS — E11.21 TYPE 2 DIABETES MELLITUS WITH DIABETIC NEPHROPATHY, WITH LONG-TERM CURRENT USE OF INSULIN (H): Primary | ICD-10-CM

## 2019-11-18 LAB — HBA1C MFR BLD: 5.8 % (ref 4.3–6)

## 2019-11-18 ASSESSMENT — MIFFLIN-ST. JEOR: SCORE: 1938.95

## 2019-11-18 ASSESSMENT — PAIN SCALES - GENERAL: PAINLEVEL: SEVERE PAIN (7)

## 2019-11-18 NOTE — PROGRESS NOTES
[facility]  Date: 11/18/2019    SUBJECTIVE:  Camacho Bhagat is a 54 year old male with PMHx of CASTAÑEDA/HCC s/p liver transplant (3/2017), fibromyalgia, DVT, HTN, RONNIE, OA, and CVA who presents for follow-up of his DM-II I think    Type 2 DM was diagnosed in 2002.  Oral meds first, later placed on insulin for the last 7 years.      Related history: h/o CASTAÑEDA cirrhosis +/- work exposures, and HCC s/p liver transplant 3/2017. Course complicated by acute abdomen/neutropenic fever requiring right hemicolectomy and colostomy Fall 2017. He later underwent colostomy takedown 1/5/18 which was complicated by abdominal wall abscess at a drain site. During this course, he has also developed excessive proteinuria which is followed by nephrology.      Current DM Regimen:  52 units of Lantus in the morning.  1 unit NovoLog per 3 g carbohydrates for all meals and snacks.  Correction dose of 1 unit per 25 mg above 150 during the day and 200 at bedtime. He has been only doing it for  BG> 200 when he is not going to eat, mainly bedtime.     Related meds:  Prednisone started for transplant and the dose remains 2.5 mg per day.  He continues to take tacrolimus and myfortic acid. He stopped his losartan in conjunction w/ his renal NP due to hyperkalemia. BP at home have been 130s-150s/70-80s.     The glucometer readings 10/19- 11/18: he has been checking 3-4 times a day. avg 161, sd 62 ( range 348-48). Fasting, before dinner, bedtime and sometimes at 1-2 am. His fasting has been variable with majority in 170-180s. He had few 70s before dinner, one 48 otherwise in acceptable range. His bedtime BG mostly in low 200s. He goes to be at 11 pm but stays up and sometimes check at 1-2 am. He sometimes eat a bedtime snack. He couldn't tell me how much novolog a day he uses but said definitely more than 50. He said he has no routine and his days varies significantly in activity and diet.     Diet: 2-3 meals per  day  Breakfast latest 10 am: varies; eats egg whites and turkey billy/sausage, or english muffins, oatmeal, cream of wheat. He had oatmeal today with glace of milk, 20 units of novolog.  Lunch: skips   Dinner 6-7:30 pm: often cooks at home; grilled chicken, fish, beef, lean pork, veggies and sometimes pasta or rice. Carb amount varies a lot.  Snacks anytime: rarely; sometimes beef jerky, carrot, celery humus, crackers , fruit.  Alcohol or sugared beverages: no alcohol, no sugary drinks, mostly water     Exercise   Walks dog TID, around 1 mile/walk  Has not lifted weights for quite sometime. He is thinking to get back into it.  Swims 2-3 times a week     Complications  + chronic complications.      1. Retinopathy: No known eye disease  Last eye exam was 11/12/19.  H/o DR. He has cataracts.       2.  Nephropathy: yes. CKD IIIa . He has significant proteinuria and f/up with nephrology.  Recent GFR in the 50s. He was started on losartan on 4/4/18 and the dose was increased to 50 mg daily. Discontinued losartan around 3/2019 due to hyperkalemia.     3. Neuropathy  Left foot tingling, mild. No ulcers or infection. No amputation.     5. Macrovascular: none      6. Lipids: not on statin . last LDL in 30s    7. HTN: follows with nephrology on amlodipine and metoprolol both at max. BP today elevated.     8. Smoking: No    Review Of Systems  12 point ROS negative unless noted above    Answers for HPI/ROS submitted by the patient on 11/7/2019   General Symptoms: No  Skin Symptoms: No  HENT Symptoms: No  EYE SYMPTOMS: No  HEART SYMPTOMS: No  LUNG SYMPTOMS: No  INTESTINAL SYMPTOMS: No  URINARY SYMPTOMS: No  REPRODUCTIVE SYMPTOMS: No  SKELETAL SYMPTOMS: Yes  BLOOD SYMPTOMS: No  NERVOUS SYSTEM SYMPTOMS: No  MENTAL HEALTH SYMPTOMS: No  Back pain: Yes  Muscle aches: No  Neck pain: Yes  Swollen joints: No  Joint pain: Yes  Bone pain: No  Muscle cramps: No  Muscle weakness: No  Joint stiffness: Yes  Bone fracture: No    Past Medical  History  Past Medical History:   Diagnosis Date     Depressive disorder, not elsewhere classified      Diabetes type 2, controlled (H) 11/10/2016     Esophageal reflux      Fibromyalgia 1/2009    dx with Dr Benitez( Rheum)     Gangrene of finger (H) 8/25/2017     H/O deep venous thrombosis 11/2001    Permanent IVC filter in place.     H/O CASTAÑEDA (nonalcoholic steatohepatitis)      H/O Pneumonia, organism unspecified(486) 10/2001    Included ARDS, sepsis, and  acute renal failure; hospitalized, required tracheostomy placement.     H/O: HTN (hypertension) 11/2001    No longer prescribed antihypertensive medication.       History of CVA (cerebrovascular accident)      History of hepatocellular carcinoma      History of liver transplant (H)      History of obstructive sleep apnea     No longer wears CPAP since losing approximately 200 pounds with his liver transplant and its complications.       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     Osteoarthritis      Presence of PERMANENT IVC filter      Rheumatoid arthritis(714.0)        Past Surgical History  Past Surgical History:   Procedure Laterality Date     BENCH LIVER N/A 3/4/2017    Procedure: BENCH LIVER;  Surgeon: Jovan Tran MD;  [facility]      TOTAL KNEE ARTHROPLASTY  2008    Right knee arthroscopy     CHOLECYSTECTOMY       COLONOSCOPY N/A 7/21/2017    Procedure: COMBINED COLONOSCOPY, SINGLE OR MULTIPLE BIOPSY/POLYPECTOMY BY BIOPSY;  Colonoscopy;  Surgeon: Izaiah Montes MD;  [facility]     ENDOSCOPIC RETROGRADE CHOLANGIOPANCREATOGRAM N/A 5/22/2018    Procedure: COMBINED ENDOSCOPIC RETROGRADE CHOLANGIOPANCREATOGRAPHY, PLACE TUBE/STENT;  Endoscopic Retrograde Cholangiopancreatography with sphincterotomy and pancreatic duct stent placement;  Surgeon: Zay Gaitan  MD;  [facility]     ENT SURGERY      Repair of deviated septum     ESOPHAGOSCOPY, GASTROSCOPY, DUODENOSCOPY (EGD), COMBINED N/A 8/4/2016    Procedure: COMBINED ESOPHAGOSCOPY, GASTROSCOPY, DUODENOSCOPY (EGD), BIOPSY SINGLE OR MULTIPLE;  Surgeon: Trent Pederson MD;  Location: [facility]     ESOPHAGOSCOPY, GASTROSCOPY, DUODENOSCOPY (EGD), COMBINED N/A 8/27/2017    Procedure: COMBINED ESOPHAGOSCOPY, GASTROSCOPY, DUODENOSCOPY (EGD);;  Surgeon: Los Wynn MD;  [facility]     INCISION AND DRAINAGE ABDOMEN WASHOUT, COMBINED Right 2/14/2018    Procedure: COMBINED INCISION AND DRAINAGE ABDOMEN WASHOUT;  COMBINED INCISION AND DRAINAGE right ABDOMEN flank;  Surgeon: Sara Dinh MD;  [facility]     LAPAROTOMY EXPLORATORY N/A 7/4/2017    Procedure: LAPAROTOMY EXPLORATORY;  Exploratory Laparotomy, washout;  Surgeon: Tip Zhang MD;  [facility]     LAPAROTOMY EXPLORATORY N/A 7/5/2017    Procedure: LAPAROTOMY EXPLORATORY;  Exploratory Laparotomy, Washout with closure.;  Surgeon: Tip Zhang MD;  [facility]     LAPAROTOMY EXPLORATORY N/A 7/26/2017    Procedure: LAPAROTOMY EXPLORATORY;  Exploratory Laparotomy, Right angelique-colectomy, end ileostomy, mucosal fistula, partial omentectomy;  Surgeon: Sara Dinh MD;  [facility]     ORTHOPEDIC SURGERY Right     Repair of dislocated wrist.       RELEASE TRIGGER FINGER Right 11/10/2017    Procedure: RELEASE TRIGGER FINGER;  Debride, V-Y Flap Right Index Finger;  Surgeon: Momo Noonan MD;  [facility]     TAKEDOWN ILEOSTOMY N/A 1/5/2018    Procedure: TAKEDOWN ILEOSTOMY;  Exploratory Laparotomy, Lysis of Adhesions, Takedown Of End Ileostomy, Takedown of mucocutaneous fistula, ileocolic resection  And Colorectal Anastomosis, Primary repair of Ventral Hernia, Anesthesia Block ;  Surgeon: Sara Dinh MD;  [facility]     TRACHEOSTOMY  2001    During hospitalization for  pneumonia.       TRANSPLANT LIVER RECIPIENT  DONOR N/A 3/4/2017    Procedure: TRANSPLANT LIVER RECIPIENT  DONOR;  Surgeon: Jovan Tran MD;  [facility]        Medications  Current Outpatient Medications   Medication Sig Dispense Refill     amLODIPine (NORVASC) 10 MG tablet Take 1 tablet (10 mg) by mouth daily 90 tablet 3     cholecalciferol (VITAMIN D3) 1000 UNIT tablet Take 1 tablet (1,000 Units) by mouth daily (Patient taking differently: Take 1,000 Units by mouth every morning ) 100 tablet 3     CIALIS 5 MG tablet Take 5 mg by mouth as needed   1     cyclobenzaprine (FLEXERIL) 10 MG tablet Take 1 tablet (10 mg) by mouth 3 times daily as needed for muscle spasms 90 tablet 3     fluticasone (FLONASE) 50 MCG/ACT nasal spray Spray 1 spray into both nostrils daily 15 mL 1     furosemide (LASIX) 40 MG tablet Take 1 tablet (40 mg) by mouth 2 times daily 180 tablet 3     gabapentin (NEURONTIN) 300 MG capsule Take 1 capsule (300 mg) by mouth 3 times daily 270 capsule 3     Glucose Blood (BLOOD GLUCOSE TEST STRIPS) STRP 1 strip by Lancet route 3 times daily 300 each 3     Insulin Lispro (HUMALOG KWIKPEN) 200 UNIT/ML soln Use one unit per 3 grams carbohydrates for all meals and snacks. Total daily dose up to 80 U. 72 mL 3     insulin pen needle (BD ENE U/F) 32G X 4 MM miscellaneous Use 1 pen needle 4 times daily or as directed. 400 each 3     LANTUS SOLOSTAR 100 UNIT/ML soln INJECT 52 UNITS SUBCUTANEOUS EVERY MORNING 60 mL 3     metoprolol succinate ER (TOPROL-XL) 200 MG 24 hr tablet Take 1 tablet (200 mg) by mouth daily Take a total of 150 mg daily 90 tablet 3     multivitamin, therapeutic with minerals (MULTI-VITAMIN) TABS tablet Take 1 tablet by mouth every morning       mycophenolic acid (GENERIC EQUIVALENT) 360 MG EC tablet Take 2 tablets (720 mg) by mouth every 12 hours 360 tablet 3     omeprazole (PRILOSEC) 20 MG DR capsule Take 1 capsule (20 mg) by mouth daily 90 capsule 3      oxyCODONE (ROXICODONE) 5 MG tablet Take 1 tablet (5 mg) by mouth every 4 hours as needed for pain maximum 6 tablet(s) per day 30 tablet 0     patiromer (VELTASSA) 8.4 g packet Take 8.4 g by mouth daily 180 each 3     predniSONE (DELTASONE) 2.5 MG tablet Take 2.5 mg by mouth daily. 90 tablet 3     tacrolimus (GENERIC EQUIVALENT) 1 MG capsule Take 4 capsules (4 mg) by mouth every 12 hours (Patient taking differently: Take 5 mg by mouth every 12 hours ) 240 capsule 11     ursodiol (ACTIGALL) 500 MG tablet Take 1 tablet (500 mg) by mouth 2 times daily 180 tablet 3       OBJECTIVE:  BP (!) 181/83   Pulse 72   Ht 1.829 m (6')   Wt 106.6 kg (235 lb)   BMI 31.87 kg/m    Body mass index is 31.87 kg/m .   Gen: NAD, well-appearing  HEENT:  Normocephalic, atraumatic, EOMI, no scleral icterus   Neck: supple, no LAD, no thyromegaly  CV: RRR, +s1/s2, no murmurs  Resp: CTAB  Abd: soft, NT/ND  Ext: WWP.  No edema.   Skin: warm and dry  Neuro: alert and oriented, no gross focal deficits  Foot exam: normal sensation to microfilament testing, no ulcers or infections, no deformities.    ASSESSMENT/PLAN:  Camacho Bhagat is a 53 year old man w/ PMx of CASTAÑEDA/HCC s/p liver transplant (3/2017), fibromyalgia, DVT, HTN, RONNIE, OA, and CVA who presents for follow-up of his DM-II     1. Type 2 Diabetes:   A1c less reliable in the context of mild stable anemia, but down from 6.1% to 5.8%.  Meter still shows some variation of the morning blood sugar. Pt unclear what may be causing this. Reported glucose sensor not covered by his insurance in the past. Unclear how much total novolog per day he uses.     Recommendations  - continue to check BG before meals and at bedtime  - write down the amount of CHO and Novolog per day for 1 week and send data to Dr. West.  - increase glargine to 55 units daily and keep novolog at 1:3 g     DM complications:  Nephropathy - + proteinuria, managed by nephrology, now off losartan for hyperkalemia  Neuropathy -  + sx of parasthesias, + h/o gangrene associated with critical illness and pressor use, requried wound debridement but now healed. Normal microfilament foot exam today  CVD/Lipids - not on statin but LDL in 30s,  continue to monitor.  ? CVA in 2012; not formal PAD, no CAD     2.  Hypertension  BP elevated today at 181/73. Says -140s/70-80s at home. Follows w/ nephrology. He said it is white coat effect, caffeine before visit and running to be on time. He decline a recheck and said will check it home and discuss with nephrology next visit.      Recommendations  - continue home monitoring and close follow-up w/ Nephrology         Return to clinic in 6 month    Hind MD Sarah  Endocrinology Fellow.    Plan discussed with Dr. West    I, Alisa West, was present with the fellow who participated in the service and in the documentation of the note.  I have verified the history and personally performed the physical exam and medical decision making.  I agree with the assessment and plan of care as documented in the note.     Alisa West MD

## 2019-11-18 NOTE — LETTER
11/18/2019       RE: Camacho Bhagat  [address]  [facility] [address]     Dear Colleague,    Thank you for referring your patient, Camacho Bhagat, to the [facility] ENDOCRINOLOGY at [facility]. Please see a copy of my visit note below.    [facility]  Date: 11/18/2019    SUBJECTIVE:  Camacho Bhagat is a 54 year old male with PMHx of CASTAÑEDA/HCC s/p liver transplant (3/2017), fibromyalgia, DVT, HTN, RONNIE, OA, and CVA who presents for follow-up of his DM-II I think    Type 2 DM was diagnosed in 2002.  Oral meds first, later placed on insulin for the last 7 years.      Related history: h/o CASTAÑEDA cirrhosis +/- work exposures, and HCC s/p liver transplant 3/2017. Course complicated by acute abdomen/neutropenic fever requiring right hemicolectomy and colostomy Fall 2017. He later underwent colostomy takedown 1/5/18 which was complicated by abdominal wall abscess at a drain site. During this course, he has also developed excessive proteinuria which is followed by nephrology.      Current DM Regimen:  52 units of Lantus in the morning.  1 unit NovoLog per 3 g carbohydrates for all meals and snacks.  Correction dose of 1 unit per 25 mg above 150 during the day and 200 at bedtime. He has been only doing it for  BG> 200 when he is not going to eat, mainly bedtime.     Related meds:  Prednisone started for transplant and the dose remains 2.5 mg per day.  He continues to take tacrolimus and myfortic acid. He stopped his losartan in conjunction w/ his renal NP due to hyperkalemia. BP at home have been 130s-150s/70-80s.     The glucometer readings 10/19- 11/18: he has been checking 3-4 times a day. avg 161, sd 62 ( range 348-48). Fasting, before dinner, bedtime and sometimes at 1-2 am. His fasting has been variable with majority in 170-180s. He had few 70s before dinner, one 48 otherwise in acceptable range. His bedtime BG mostly in low 200s. He goes to be at  11 pm but stays up and sometimes check at 1-2 am. He sometimes eat a bedtime snack. He couldn't tell me how much novolog a day he uses but said definitely more than 50. He said he has no routine and his days varies significantly in activity and diet.     Diet: 2-3 meals per day  Breakfast latest 10 am: varies; eats egg whites and turkey billy/sausage, or english muffins, oatmeal, cream of wheat. He had oatmeal today with glace of milk, 20 units of novolog.  Lunch: skips   Dinner 6-7:30 pm: often cooks at home; grilled chicken, fish, beef, lean pork, veggies and sometimes pasta or rice. Carb amount varies a lot.  Snacks anytime: rarely; sometimes beef jerky, carrot, celery humus, crackers , fruit.  Alcohol or sugared beverages: no alcohol, no sugary drinks, mostly water     Exercise   Walks dog  TID, around 1 mile/walk  Has not lifted weights for quite sometime. He is thinking to get back into it.  Swims  2-3 times a week     Complications  + chronic complications.      1. Retinopathy: No known eye disease  Last eye exam was 11/12/19.  H/o DR. He has cataracts.       2.  Nephropathy: yes. CKD IIIa . He has significant proteinuria and f/up with nephrology.  Recent GFR in the 50s. He was started on losartan on 4/4/18 and the dose was increased to 50 mg daily. Discontinued losartan around 3/2019 due to hyperkalemia.     3. Neuropathy  Left foot tingling, mild. No ulcers or infection. No amputation.     5. Macrovascular: none      6. Lipids: not on statin . last LDL in 30s    7. HTN: follows with nephrology on amlodipine and metoprolol both at max. BP today elevated.     8. Smoking: No    Review Of Systems  12 point ROS negative unless noted above    Answers for HPI/ROS submitted by the patient on 11/7/2019   General Symptoms: No  Skin Symptoms: No  HENT Symptoms: No  EYE SYMPTOMS: No  HEART SYMPTOMS: No  LUNG SYMPTOMS: No  INTESTINAL SYMPTOMS: No  URINARY SYMPTOMS: No  REPRODUCTIVE SYMPTOMS: No  SKELETAL SYMPTOMS:  Yes  BLOOD SYMPTOMS: No  NERVOUS SYSTEM SYMPTOMS: No  MENTAL HEALTH SYMPTOMS: No  Back pain: Yes  Muscle aches: No  Neck pain: Yes  Swollen joints: No  Joint pain: Yes  Bone pain: No  Muscle cramps: No  Muscle weakness: No  Joint stiffness: Yes  Bone fracture: No    Past Medical History  Past Medical History:   Diagnosis Date     Depressive disorder, not elsewhere classified      Diabetes type 2, controlled (H) 11/10/2016     Esophageal reflux      Fibromyalgia 1/2009    dx with Dr Benitez( Rheum)     Gangrene of finger (H) 8/25/2017     H/O deep venous thrombosis 11/2001    Permanent IVC filter in place.     H/O CASTAÑEDA (nonalcoholic steatohepatitis)      H/O Pneumonia, organism unspecified(486) 10/2001    Included ARDS, sepsis, and  acute renal failure; hospitalized, required tracheostomy placement.     H/O: HTN (hypertension) 11/2001    No longer prescribed antihypertensive medication.       History of CVA (cerebrovascular accident)      History of hepatocellular carcinoma      History of liver transplant (H)      History of obstructive sleep apnea     No longer wears CPAP since losing approximately 200 pounds with his liver transplant and its complications.       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     Osteoarthritis      Presence of PERMANENT IVC filter      Rheumatoid arthritis(714.0)        Past Surgical History  Past Surgical History:   Procedure Laterality Date     BENCH LIVER N/A 3/4/2017    Procedure: BENCH LIVER;  Surgeon: Jovan Tran MD;  Location: [facility] TOTAL KNEE ARTHROPLASTY  2008    Right knee arthroscopy     CHOLECYSTECTOMY       COLONOSCOPY N/A 7/21/2017    Procedure: COMBINED COLONOSCOPY, SINGLE OR MULTIPLE BIOPSY/POLYPECTOMY BY BIOPSY;  Colonoscopy;  Surgeon: Izaiah Montes MD;  Location:  GI      ENDOSCOPIC RETROGRADE CHOLANGIOPANCREATOGRAM N/A 5/22/2018    Procedure: COMBINED ENDOSCOPIC RETROGRADE CHOLANGIOPANCREATOGRAPHY, PLACE TUBE/STENT;  Endoscopic Retrograde Cholangiopancreatography with sphincterotomy and pancreatic duct stent placement;  Surgeon: Zay Gaitan MD;  Location: [facility] OR     ENT SURGERY      Repair of deviated septum     ESOPHAGOSCOPY, GASTROSCOPY, DUODENOSCOPY (EGD), COMBINED N/A 8/4/2016    Procedure: COMBINED ESOPHAGOSCOPY, GASTROSCOPY, DUODENOSCOPY (EGD), BIOPSY SINGLE OR MULTIPLE;  Surgeon: Trent Pederson MD;  Location:  GI     ESOPHAGOSCOPY, GASTROSCOPY, DUODENOSCOPY (EGD), COMBINED N/A 8/27/2017    Procedure: COMBINED ESOPHAGOSCOPY, GASTROSCOPY, DUODENOSCOPY (EGD);;  Surgeon: Los Wynn MD;  Location:  GI     INCISION AND DRAINAGE ABDOMEN WASHOUT, COMBINED Right 2/14/2018    Procedure: COMBINED INCISION AND DRAINAGE ABDOMEN WASHOUT;  COMBINED INCISION AND DRAINAGE right ABDOMEN flank;  Surgeon: Sara Dinh MD;  Location: [facility] OR     LAPAROTOMY EXPLORATORY N/A 7/4/2017    Procedure: LAPAROTOMY EXPLORATORY;  Exploratory Laparotomy, washout;  Surgeon: Tip Zhang MD;  Location: [facility] OR     LAPAROTOMY EXPLORATORY N/A 7/5/2017    Procedure: LAPAROTOMY EXPLORATORY;  Exploratory Laparotomy, Washout with closure.;  Surgeon: Tip Zhang MD;  Location: [facility] OR     LAPAROTOMY EXPLORATORY N/A 7/26/2017    Procedure: LAPAROTOMY EXPLORATORY;  Exploratory Laparotomy, Right angelique-colectomy, end ileostomy, mucosal fistula, partial omentectomy;  Surgeon: Sara Dinh MD;  Location: [facility] OR     ORTHOPEDIC SURGERY Right     Repair of dislocated wrist.       RELEASE TRIGGER FINGER Right 11/10/2017    Procedure: RELEASE TRIGGER FINGER;  Debride, V-Y Flap Right Index Finger;  Surgeon: Momo Noonan MD;  [facility]: [facility]     TAKEDOWN ILEOSTOMY N/A 1/5/2018    Procedure: TAKEDOWN ILEOSTOMY;  Exploratory Laparotomy, Lysis of  Adhesions, Takedown Of End Ileostomy, Takedown of mucocutaneous fistula, ileocolic resection  And Colorectal Anastomosis, Primary repair of Ventral Hernia, Anesthesia Block ;  Surgeon: Sara Dinh MD;  Location: [facility] OR     TRACHEOSTOMY  2001    During hospitalization for pneumonia.       TRANSPLANT LIVER RECIPIENT  DONOR N/A 3/4/2017    Procedure: TRANSPLANT LIVER RECIPIENT  DONOR;  Surgeon: Jovan Tran MD;  Location: [facility] OR        Medications  Current Outpatient Medications   Medication Sig Dispense Refill     amLODIPine (NORVASC) 10 MG tablet Take 1 tablet (10 mg) by mouth daily 90 tablet 3     cholecalciferol (VITAMIN D3) 1000 UNIT tablet Take 1 tablet (1,000 Units) by mouth daily (Patient taking differently: Take 1,000 Units by mouth every morning ) 100 tablet 3     CIALIS 5 MG tablet Take 5 mg by mouth as needed   1     cyclobenzaprine (FLEXERIL) 10 MG tablet Take 1 tablet (10 mg) by mouth 3 times daily as needed for muscle spasms 90 tablet 3     fluticasone (FLONASE) 50 MCG/ACT nasal spray Spray 1 spray into both nostrils daily 15 mL 1     furosemide (LASIX) 40 MG tablet Take 1 tablet (40 mg) by mouth 2 times daily 180 tablet 3     gabapentin (NEURONTIN) 300 MG capsule Take 1 capsule (300 mg) by mouth 3 times daily 270 capsule 3     Glucose Blood (BLOOD GLUCOSE TEST STRIPS) STRP 1 strip by Lancet route 3 times daily 300 each 3     Insulin Lispro (HUMALOG KWIKPEN) 200 UNIT/ML soln Use one unit per 3 grams carbohydrates for all meals and snacks. Total daily dose up to 80 U. 72 mL 3     insulin pen needle (BD ENE U/F) 32G X 4 MM miscellaneous Use 1 pen needle 4 times daily or as directed. 400 each 3     LANTUS SOLOSTAR 100 UNIT/ML soln INJECT 52 UNITS SUBCUTANEOUS EVERY MORNING 60 mL 3     metoprolol succinate ER (TOPROL-XL) 200 MG 24 hr tablet Take 1 tablet (200 mg) by mouth daily Take a total of 150 mg daily 90 tablet 3     multivitamin, therapeutic with minerals  (MULTI-VITAMIN) TABS tablet Take 1 tablet by mouth every morning       mycophenolic acid (GENERIC EQUIVALENT) 360 MG EC tablet Take 2 tablets (720 mg) by mouth every 12 hours 360 tablet 3     omeprazole (PRILOSEC) 20 MG DR capsule Take 1 capsule (20 mg) by mouth daily 90 capsule 3     oxyCODONE (ROXICODONE) 5 MG tablet Take 1 tablet (5 mg) by mouth every 4 hours as needed for pain maximum 6 tablet(s) per day 30 tablet 0     patiromer (VELTASSA) 8.4 g packet Take 8.4 g by mouth daily 180 each 3     predniSONE (DELTASONE) 2.5 MG tablet Take 2.5 mg by mouth daily. 90 tablet 3     tacrolimus (GENERIC EQUIVALENT) 1 MG capsule Take 4 capsules (4 mg) by mouth every 12 hours (Patient taking differently: Take 5 mg by mouth every 12 hours ) 240 capsule 11     ursodiol (ACTIGALL) 500 MG tablet Take 1 tablet (500 mg) by mouth 2 times daily 180 tablet 3       OBJECTIVE:  BP (!) 181/83   Pulse 72   Ht 1.829 m (6')   Wt 106.6 kg (235 lb)   BMI 31.87 kg/m     Body mass index is 31.87 kg/m .   Gen: NAD, well-appearing  HEENT:  Normocephalic, atraumatic, EOMI, no scleral icterus   Neck: supple, no LAD, no thyromegaly  CV: RRR, +s1/s2, no murmurs  Resp: CTAB  Abd: soft, NT/ND  Ext: WWP.  No edema.   Skin: warm and dry  Neuro: alert and oriented, no gross focal deficits  Foot exam: normal sensation to microfilament testing, no ulcers or infections, no deformities.    ASSESSMENT/PLAN:  Camacho Bhagat is a 53 year old man w/ PMx of CASTAÑEDA/HCC s/p liver transplant (3/2017), fibromyalgia, DVT, HTN, RONNIE, OA, and CVA who presents for follow-up of his DM-II     1. Type 2 Diabetes:   A1c less reliable in the context of mild stable anemia, but down from 6.1% to 5.8%.  Meter still shows some variation of the morning blood sugar. Pt unclear what may be causing this. Reported glucose sensor not covered by his insurance in the past.  Unclear how much total novolog per day he uses.     Recommendations  - continue to check BG before meals and at  bedtime  - write down the amount of CHO and Novolog per day for 1 week and send data to Dr. West.  - increase glargine to 55 units daily and keep novolog at 1:3 g     DM complications:  Nephropathy - + proteinuria, managed by nephrology, now off losartan for hyperkalemia  Neuropathy - + sx of parasthesias, + h/o gangrene associated with critical illness and pressor use, requried wound debridement but now healed. Normal microfilament foot exam today  CVD/Lipids - not on statin but LDL in 30s,  continue to monitor.  ? CVA in 2012; not formal PAD, no CAD     2.  Hypertension  BP  elevated today at 181/73. Says -140s/70-80s at home. Follows w/ nephrology. He said it is white coat effect, caffeine before visit and running to be on time. He decline a recheck and said will check it home and discuss with nephrology next visit.      Recommendations  - continue home monitoring and close follow-up w/ Nephrology         Return to clinic in 6 month    Louise Smith MD  Endocrinology Fellow.    Plan discussed with Dr. West    I, Alisa West, was present with the fellow who participated in the service and in the documentation of the note.  I have verified the history and personally performed the physical exam and medical decision making.  I agree with the assessment and plan of care as documented in the note.     Alisa West MD

## 2019-11-26 ENCOUNTER — HOSPITAL ENCOUNTER (OUTPATIENT)
Dept: LAB | Facility: CLINIC | Age: 54
Discharge: HOME | End: 2019-11-26
Attending: INTERNAL MEDICINE
Payer: COMMERCIAL

## 2019-11-26 DIAGNOSIS — N18.2 CKD (CHRONIC KIDNEY DISEASE) STAGE 2, GFR 60-89 ML/MIN: ICD-10-CM

## 2019-11-26 DIAGNOSIS — Z13.220 LIPID SCREENING: ICD-10-CM

## 2019-11-26 DIAGNOSIS — Z94.4 LIVER REPLACED BY TRANSPLANT (H): ICD-10-CM

## 2019-11-26 LAB
ALBUMIN SERPL-MCNC: 4 G/DL (ref 3.4–5)
ALP SERPL-CCNC: 216 U/L (ref 40–150)
ALT SERPL W P-5'-P-CCNC: 37 U/L (ref 0–70)
ANION GAP SERPL CALCULATED.3IONS-SCNC: 5 MMOL/L (ref 3–14)
AST SERPL W P-5'-P-CCNC: 21 U/L (ref 0–45)
BILIRUB DIRECT SERPL-MCNC: 0.3 MG/DL (ref 0–0.2)
BILIRUB SERPL-MCNC: 0.8 MG/DL (ref 0.2–1.3)
BUN SERPL-MCNC: 50 MG/DL (ref 7–30)
CALCIUM SERPL-MCNC: 9.3 MG/DL (ref 8.5–10.1)
CHLORIDE SERPL-SCNC: 114 MMOL/L (ref 94–109)
CO2 SERPL-SCNC: 23 MMOL/L (ref 20–32)
CREAT SERPL-MCNC: 1.41 MG/DL (ref 0.66–1.25)
ERYTHROCYTE [DISTWIDTH] IN BLOOD BY AUTOMATED COUNT: 13.4 % (ref 10–15)
GFR SERPL CREATININE-BSD FRML MDRD: 56 ML/MIN/{1.73_M2}
GLUCOSE SERPL-MCNC: 103 MG/DL (ref 70–99)
HCT VFR BLD AUTO: 37.5 % (ref 40–53)
HGB BLD-MCNC: 12.6 G/DL (ref 13.3–17.7)
MCH RBC QN AUTO: 31.9 PG (ref 26.5–33)
MCHC RBC AUTO-ENTMCNC: 33.6 G/DL (ref 31.5–36.5)
MCV RBC AUTO: 95 FL (ref 78–100)
PHOSPHATE SERPL-MCNC: 3.3 MG/DL (ref 2.5–4.5)
PLATELET # BLD AUTO: 95 10E9/L (ref 150–450)
POTASSIUM SERPL-SCNC: 5.2 MMOL/L (ref 3.4–5.3)
PROT SERPL-MCNC: 7.4 G/DL (ref 6.8–8.8)
RBC # BLD AUTO: 3.95 10E12/L (ref 4.4–5.9)
SODIUM SERPL-SCNC: 142 MMOL/L (ref 133–144)
TACROLIMUS BLD-MCNC: 6.8 UG/L (ref 5–15)
TME LAST DOSE: NORMAL H
WBC # BLD AUTO: 6.2 10E9/L (ref 4–11)

## 2019-11-26 PROCEDURE — 84460 ALANINE AMINO (ALT) (SGPT): CPT

## 2019-11-26 PROCEDURE — 80069 RENAL FUNCTION PANEL: CPT

## 2019-11-26 PROCEDURE — 80197 ASSAY OF TACROLIMUS: CPT | Performed by: INTERNAL MEDICINE

## 2019-11-26 PROCEDURE — 82247 BILIRUBIN TOTAL: CPT

## 2019-11-26 PROCEDURE — 85027 COMPLETE CBC AUTOMATED: CPT

## 2019-11-26 PROCEDURE — 80048 BASIC METABOLIC PNL TOTAL CA: CPT

## 2019-11-26 PROCEDURE — 82248 BILIRUBIN DIRECT: CPT

## 2019-11-26 PROCEDURE — 84450 TRANSFERASE (AST) (SGOT): CPT

## 2019-11-26 PROCEDURE — 84075 ASSAY ALKALINE PHOSPHATASE: CPT

## 2019-11-26 PROCEDURE — 36415 COLL VENOUS BLD VENIPUNCTURE: CPT | Performed by: INTERNAL MEDICINE

## 2019-11-26 PROCEDURE — 36415 COLL VENOUS BLD VENIPUNCTURE: CPT

## 2019-11-26 PROCEDURE — 84155 ASSAY OF PROTEIN SERUM: CPT

## 2019-11-27 ENCOUNTER — OFFICE VISIT (OUTPATIENT)
Dept: INTERNAL MEDICINE | Facility: CLINIC | Age: 54
End: 2019-11-27
Payer: COMMERCIAL

## 2019-11-27 VITALS
BODY MASS INDEX: 31.87 KG/M2 | HEART RATE: 80 BPM | SYSTOLIC BLOOD PRESSURE: 167 MMHG | WEIGHT: 235 LBS | DIASTOLIC BLOOD PRESSURE: 93 MMHG | RESPIRATION RATE: 16 BRPM | OXYGEN SATURATION: 100 %

## 2019-11-27 DIAGNOSIS — I15.1 HYPERTENSION SECONDARY TO OTHER RENAL DISORDERS: ICD-10-CM

## 2019-11-27 DIAGNOSIS — Z12.5 SPECIAL SCREENING FOR MALIGNANT NEOPLASM OF PROSTATE: Primary | ICD-10-CM

## 2019-11-27 DIAGNOSIS — M06.9 RHEUMATOID ARTHRITIS INVOLVING MULTIPLE SITES, UNSPECIFIED RHEUMATOID FACTOR PRESENCE: ICD-10-CM

## 2019-11-27 DIAGNOSIS — M79.671 PAIN IN BOTH FEET: ICD-10-CM

## 2019-11-27 DIAGNOSIS — Z79.4 TYPE 2 DIABETES MELLITUS WITH DIABETIC POLYNEUROPATHY, WITH LONG-TERM CURRENT USE OF INSULIN (H): ICD-10-CM

## 2019-11-27 DIAGNOSIS — M79.672 PAIN IN BOTH FEET: ICD-10-CM

## 2019-11-27 DIAGNOSIS — E11.42 TYPE 2 DIABETES MELLITUS WITH DIABETIC POLYNEUROPATHY, WITH LONG-TERM CURRENT USE OF INSULIN (H): ICD-10-CM

## 2019-11-27 ASSESSMENT — PAIN SCALES - GENERAL: PAINLEVEL: SEVERE PAIN (7)

## 2019-11-27 NOTE — PATIENT INSTRUCTIONS
[facility] Medication Refill Request Information:  * Please contact your pharmacy regarding ANY request for medication refills.  ** [facility] Prescription Fax = [phone]  * Please allow 3 business days for routine medication refills.  * Please allow 5 business days for controlled substance medication refills.     [facility] Test Result notification information:  *You will be notified with in 7-10 days of your appointment day regarding the results of your test.  If you are on MyChart you will be notified as soon as the provider has reviewed the results and signed off on them.

## 2019-11-27 NOTE — PROGRESS NOTES
ASSESSMENT/PLAN:    CKD Stage 2 - started Valtessa for high K, followed by nephrology next 2/18/20    HTN - on Toprol 200, lasix 40 BID, amlodipine 10; high in clinic on 11/18/19 but says normal at home    DM - +neuro, +nephro, - retinopathy; followed by endocrine, on Lantus and Novolog    Rheumatoid arthritis - on oxycodone and flexeril as needed    Liver transplant - on prednisone, tacrolimus, myfortic.  Sees GI 12/2/19    Foot pain likely from the hip pain - he has degenerative changes of the thoracic and back which also contribute.  There is not much else we can do for him in this regard.   - he has great shoe inserts   - he will have to take things easy with not straining his hips - he has an active, big dog that he chases   - monitor over time and if it does get worse, he will have to see sports medicine   - he takes oxycodone when needed in extreme cases - avoids ibuprofen because of the liver    Preventative - he asked for prostate lab testing since it was last done in 2016    Shay Kirkpatrick MD, FACP      Chief complaint:  Camacho Bhagat is a 54 year old male presents for   Chief Complaint   Patient presents with     Recheck Medication     Flu     Patient would like flu shot today        SUBJECTIVE:    He has been clearing buckthorn and was stuck a few times.  His daughter gets a bad allergic reaction so wonders if he is at risk.  The sites he was pricked are not swollen or red.  There does not appear that he is having problems.    He says that the lateral part of his shoes are wearing out more than the other parts.  He says that part of the foot is painful also.  He also has bilateral hip pain.  He is walking more than normal with a [place] and Camacho keeps up.  He is cutting trees and hurt his back too.    He is still doing well with his liver transplant.  This is going well according to Dr. Camejo.  He is not having to go in as often.    CKD is going well.  He is also not having to go in as often for  that either.  His creat has dropped.    Diabetes - has been seen by endocrinology.  He is trending low on his glucose - his A1c is 5.8%.    Medications and allergies were reviewed by me today.       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   Plan:  Increase tacrolimus dose, currently on low dose tacrolimus with level < 3.0.  Plan to increase for goal = 8.    Also on myfortic 720mg Q 12 hours.   Prednisone 2.5mg q day.           Abscess, intra-abdominal, postoperative 02/13/2018     Priority: Medium     Ileostomy in place (H) 01/05/2018     Priority: Medium     Peristomal skin complication 11/16/2017     Priority: Medium     Painful periwound skin 11/16/2017     Priority: Medium     Encounter for attention to ileostomy (H) 11/16/2017     Priority: Medium     History of creation of ostomy (H) 10/30/2017     Priority: Medium     Elevated serum creatinine 10/16/2017     Priority: Medium     Pancytopenia (H) 08/25/2017     Priority: Medium     Gangrene of finger (H) 08/25/2017     Priority: Medium     Ischemia of both lower extremities 08/25/2017     Priority: Medium     Distal ischemia due to shock/high pressor requirements       S/P liver transplant (H) 07/26/2017     Priority: Medium     Neutropenic colitis (H) 07/04/2017     Priority: Medium     Immunosuppressed status (H) 03/10/2017     Priority: Medium     Liver transplant recipient (H) 03/04/2017     Priority: Medium     Hyperkalemia  02/14/2017     Priority: Medium     Hepatocellular carcinoma (H) 01/27/2016     Priority: Medium     Osteoarthritis 01/18/2015     Priority: Medium     Rheumatoid arthritis (H) 01/18/2015     Priority: Medium     Medication refill- do not delete  11/13/2013     Priority: Medium     Fibromyalgia 08/15/2011     Priority: Medium     Sicca syndrome (H) 08/15/2011     Priority: Medium     Testicular hypofunction      Priority: Medium     Problem list name updated by automated process. Provider to review       Carpal tunnel syndrome 07/08/2002     Priority: Medium       PHYSICAL EXAM:  BP (!) 167/93   Pulse 80   Resp 16   Wt 106.6 kg (235 lb)   SpO2 100%   BMI 31.87 kg/m    Constitutional: no distress, comfortable, pleasant   Musculoskeletal: lateral foot pain but no abnormalities or sores, no swelling of the feet.  He has pain on the very lateral sides of his thighs.  Standing there are good arches to his feet and no bowing of the legs.  Skin: no concerning lesions - he has a 3mm punture wound from buckthorn on his left palm

## 2019-12-02 ENCOUNTER — OFFICE VISIT (OUTPATIENT)
Dept: GASTROENTEROLOGY | Facility: CLINIC | Age: 54
End: 2019-12-02
Attending: INTERNAL MEDICINE
Payer: COMMERCIAL

## 2019-12-02 VITALS
HEART RATE: 64 BPM | BODY MASS INDEX: 31.87 KG/M2 | TEMPERATURE: 99.3 F | DIASTOLIC BLOOD PRESSURE: 83 MMHG | HEIGHT: 72 IN | SYSTOLIC BLOOD PRESSURE: 152 MMHG | OXYGEN SATURATION: 98 %

## 2019-12-02 DIAGNOSIS — M25.551 PAIN OF BOTH HIP JOINTS: ICD-10-CM

## 2019-12-02 DIAGNOSIS — Z94.4 LIVER REPLACED BY TRANSPLANT (H): Primary | ICD-10-CM

## 2019-12-02 DIAGNOSIS — M25.552 PAIN OF BOTH HIP JOINTS: ICD-10-CM

## 2019-12-02 PROCEDURE — G0463 HOSPITAL OUTPT CLINIC VISIT: HCPCS | Mod: ZF

## 2019-12-02 ASSESSMENT — PAIN SCALES - GENERAL: PAINLEVEL: WORST PAIN (10)

## 2019-12-02 NOTE — LETTER
12/2/2019      RE: Camacho Bhagat  [address] [facility] [address]       HISTORY OF PRESENT ILLNESS:  I had the pleasure of seeing Camacho Bhagat for followup in the Liver Transplant Clinic at the [facility] on 12/02/2019.  Mr. Bhagat returns for followup now 2-1/2 years status post liver transplantation for hepatocellular carcinoma.      He is doing well at this visit.  He denies any abdominal pain, itching or skin rash or fatigue.  He denies any increased abdominal girth or lower extremity edema.  He denies any fevers or chills, cough or shortness of breath.  He denies any nausea or vomiting, diarrhea or constipation.  His appetite has been good.  His weight is roughly the same.      His only complaint he really has at this visit is bilateral hip pain.  It is fairly constantly.  It does get worse when he is standing for longer periods of time.     Current Outpatient Medications   Medication     amLODIPine (NORVASC) 10 MG tablet     cholecalciferol (VITAMIN D3) 1000 UNIT tablet     CIALIS 5 MG tablet     cyclobenzaprine (FLEXERIL) 10 MG tablet     fluticasone (FLONASE) 50 MCG/ACT nasal spray     furosemide (LASIX) 40 MG tablet     gabapentin (NEURONTIN) 300 MG capsule     Glucose Blood (BLOOD GLUCOSE TEST STRIPS) STRP     insulin glargine (LANTUS SOLOSTAR) 100 UNIT/ML pen     Insulin Lispro (HUMALOG KWIKPEN) 200 UNIT/ML soln     insulin pen needle (BD ENE U/F) 32G X 4 MM miscellaneous     metoprolol succinate ER (TOPROL-XL) 200 MG 24 hr tablet     multivitamin, therapeutic with minerals (MULTI-VITAMIN) TABS tablet     mycophenolic acid (GENERIC EQUIVALENT) 360 MG EC tablet     omeprazole (PRILOSEC) 20 MG DR capsule     oxyCODONE (ROXICODONE) 5 MG tablet     patiromer (VELTASSA) 8.4 g packet     predniSONE (DELTASONE) 2.5 MG tablet     tacrolimus (GENERIC EQUIVALENT) 1 MG capsule     ursodiol (ACTIGALL) 500 MG tablet     No current facility-administered medications for this visit.       BP (!) 152/83   Pulse 64   Temp 99.3  F (37.4  C) (Oral)   Ht 1.829 m (6')   SpO2 98%   BMI 31.87 kg/m       PHYSICAL EXAMINATION:  In general, he looks quite well.  HEENT exam shows no scleral icterus or temporal muscle wasting.  Chest is clear.  Abdominal exam shows no increase in girth.  No masses or tenderness to palpation are present.  His liver is 10 cm in span without left lobe enlargement.  No spleen tip is palpable, and extremity exam shows no edema.  Skin exam shows no stigmata of chronic liver disease.  Neurologic exam is nonfocal.     Recent Results (from the past 168 hour(s))   Tacrolimus level    Collection Time: 11/26/19 12:22 PM   Result Value Ref Range    Tacrolimus Last Dose 11/26/2019 0015     Tacrolimus Level 6.8 5.0 - 15.0 ug/L   CBC with platelets    Collection Time: 11/26/19 12:22 PM   Result Value Ref Range    WBC 6.2 4.0 - 11.0 10e9/L    RBC Count 3.95 (L) 4.4 - 5.9 10e12/L    Hemoglobin 12.6 (L) 13.3 - 17.7 g/dL    Hematocrit 37.5 (L) 40.0 - 53.0 %    MCV 95 78 - 100 fl    MCH 31.9 26.5 - 33.0 pg    MCHC 33.6 31.5 - 36.5 g/dL    RDW 13.4 10.0 - 15.0 %    Platelet Count 95 (L) 150 - 450 10e9/L   Renal panel    Collection Time: 11/26/19 12:22 PM   Result Value Ref Range    Sodium 142 133 - 144 mmol/L    Potassium 5.2 3.4 - 5.3 mmol/L    Chloride 114 (H) 94 - 109 mmol/L    Carbon Dioxide 23 20 - 32 mmol/L    Anion Gap 5 3 - 14 mmol/L    Glucose 103 (H) 70 - 99 mg/dL    Urea Nitrogen 50 (H) 7 - 30 mg/dL    Creatinine 1.41 (H) 0.66 - 1.25 mg/dL    GFR Estimate 56 (L) >60 mL/min/[1.73_m2]    GFR Estimate If Black 64 >60 mL/min/[1.73_m2]    Calcium 9.3 8.5 - 10.1 mg/dL    Phosphorus 3.3 2.5 - 4.5 mg/dL    Albumin 4.0 3.4 - 5.0 g/dL   Alkaline phosphatase    Collection Time: 11/26/19 12:22 PM   Result Value Ref Range    Alkaline Phosphatase 216 (H) 40 - 150 U/L   ALT    Collection Time: 11/26/19 12:22 PM   Result Value Ref Range    ALT 37 0 - 70 U/L   AST    Collection Time: 11/26/19  12:22 PM   Result Value Ref Range    AST 21 0 - 45 U/L   Bilirubin  total    Collection Time: 11/26/19 12:22 PM   Result Value Ref Range    Bilirubin Total 0.8 0.2 - 1.3 mg/dL   Bilirubin direct    Collection Time: 11/26/19 12:22 PM   Result Value Ref Range    Bilirubin Direct 0.3 (H) 0.0 - 0.2 mg/dL   Protein total    Collection Time: 11/26/19 12:22 PM   Result Value Ref Range    Protein Total 7.4 6.8 - 8.8 g/dL      IMPRESSION:  Mr. Bhagat is now more than 2-1/2 years status post liver transplantation.  He was transplanted for hepatocellular carcinoma.  There is no evidence of any recurrence at this point in time.  His kidney function is stable.  His liver function is slightly improved.  I will not be making any change to his medical regimen.  I will see him back in the clinic again in 6 months.  At that point, he will be due for a DEXA scan.      In terms of working up his hip pain, I will start with pelvic films.      Thank you very much for allowing me to participate in the care of this patient.  If you have any questions regarding my recommendations, please do not hesitate to contact me.       Toñito Camejo MD      Professor of Medicine  [employer]      Executive Medical Director, Solid Organ Transplant Program  [facility]

## 2019-12-02 NOTE — NURSING NOTE
Chief Complaint   Patient presents with     RECHECK     liver tx     BP (!) 152/83   Pulse 64   Temp 99.3  F (37.4  C) (Oral)   Ht 1.829 m (6')   SpO2 98%   BMI 31.87 kg/m    Mattie Todd, CMA

## 2019-12-02 NOTE — PROGRESS NOTES
HISTORY OF PRESENT ILLNESS:  I had the pleasure of seeing Camacho Bhagat for followup in the Liver Transplant Clinic at the [facility] on 12/02/2019.  Mr. Bhagat returns for followup now 2-1/2 years status post liver transplantation for hepatocellular carcinoma.      He is doing well at this visit.  He denies any abdominal pain, itching or skin rash or fatigue.  He denies any increased abdominal girth or lower extremity edema.  He denies any fevers or chills, cough or shortness of breath.  He denies any nausea or vomiting, diarrhea or constipation.  His appetite has been good.  His weight is roughly the same.      His only complaint he really has at this visit is bilateral hip pain.  It is fairly constantly.  It does get worse when he is standing for longer periods of time.     Current Outpatient Medications   Medication     amLODIPine (NORVASC) 10 MG tablet     cholecalciferol (VITAMIN D3) 1000 UNIT tablet     CIALIS 5 MG tablet     cyclobenzaprine (FLEXERIL) 10 MG tablet     fluticasone (FLONASE) 50 MCG/ACT nasal spray     furosemide (LASIX) 40 MG tablet     gabapentin (NEURONTIN) 300 MG capsule     Glucose Blood (BLOOD GLUCOSE TEST STRIPS) STRP     insulin glargine (LANTUS SOLOSTAR) 100 UNIT/ML pen     Insulin Lispro (HUMALOG KWIKPEN) 200 UNIT/ML soln     insulin pen needle (BD ENE U/F) 32G X 4 MM miscellaneous     metoprolol succinate ER (TOPROL-XL) 200 MG 24 hr tablet     multivitamin, therapeutic with minerals (MULTI-VITAMIN) TABS tablet     mycophenolic acid (GENERIC EQUIVALENT) 360 MG EC tablet     omeprazole (PRILOSEC) 20 MG DR capsule     oxyCODONE (ROXICODONE) 5 MG tablet     patiromer (VELTASSA) 8.4 g packet     predniSONE (DELTASONE) 2.5 MG tablet     tacrolimus (GENERIC EQUIVALENT) 1 MG capsule     ursodiol (ACTIGALL) 500 MG tablet     No current facility-administered medications for this visit.      BP (!) 152/83   Pulse 64   Temp 99.3  F (37.4  C) (Oral)   Ht 1.829 m (6')    SpO2 98%   BMI 31.87 kg/m      PHYSICAL EXAMINATION:  In general, he looks quite well.  HEENT exam shows no scleral icterus or temporal muscle wasting.  Chest is clear.  Abdominal exam shows no increase in girth.  No masses or tenderness to palpation are present.  His liver is 10 cm in span without left lobe enlargement.  No spleen tip is palpable, and extremity exam shows no edema.  Skin exam shows no stigmata of chronic liver disease.  Neurologic exam is nonfocal.     Recent Results (from the past 168 hour(s))   Tacrolimus level    Collection Time: 11/26/19 12:22 PM   Result Value Ref Range    Tacrolimus Last Dose 11/26/2019 0015     Tacrolimus Level 6.8 5.0 - 15.0 ug/L   CBC with platelets    Collection Time: 11/26/19 12:22 PM   Result Value Ref Range    WBC 6.2 4.0 - 11.0 10e9/L    RBC Count 3.95 (L) 4.4 - 5.9 10e12/L    Hemoglobin 12.6 (L) 13.3 - 17.7 g/dL    Hematocrit 37.5 (L) 40.0 - 53.0 %    MCV 95 78 - 100 fl    MCH 31.9 26.5 - 33.0 pg    MCHC 33.6 31.5 - 36.5 g/dL    RDW 13.4 10.0 - 15.0 %    Platelet Count 95 (L) 150 - 450 10e9/L   Renal panel    Collection Time: 11/26/19 12:22 PM   Result Value Ref Range    Sodium 142 133 - 144 mmol/L    Potassium 5.2 3.4 - 5.3 mmol/L    Chloride 114 (H) 94 - 109 mmol/L    Carbon Dioxide 23 20 - 32 mmol/L    Anion Gap 5 3 - 14 mmol/L    Glucose 103 (H) 70 - 99 mg/dL    Urea Nitrogen 50 (H) 7 - 30 mg/dL    Creatinine 1.41 (H) 0.66 - 1.25 mg/dL    GFR Estimate 56 (L) >60 mL/min/[1.73_m2]    GFR Estimate If Black 64 >60 mL/min/[1.73_m2]    Calcium 9.3 8.5 - 10.1 mg/dL    Phosphorus 3.3 2.5 - 4.5 mg/dL    Albumin 4.0 3.4 - 5.0 g/dL   Alkaline phosphatase    Collection Time: 11/26/19 12:22 PM   Result Value Ref Range    Alkaline Phosphatase 216 (H) 40 - 150 U/L   ALT    Collection Time: 11/26/19 12:22 PM   Result Value Ref Range    ALT 37 0 - 70 U/L   AST    Collection Time: 11/26/19 12:22 PM   Result Value Ref Range    AST 21 0 - 45 U/L   Bilirubin  total    Collection  Time: 11/26/19 12:22 PM   Result Value Ref Range    Bilirubin Total 0.8 0.2 - 1.3 mg/dL   Bilirubin direct    Collection Time: 11/26/19 12:22 PM   Result Value Ref Range    Bilirubin Direct 0.3 (H) 0.0 - 0.2 mg/dL   Protein total    Collection Time: 11/26/19 12:22 PM   Result Value Ref Range    Protein Total 7.4 6.8 - 8.8 g/dL      IMPRESSION:  Mr. Bhagat is now more than 2-1/2 years status post liver transplantation.  He was transplanted for hepatocellular carcinoma.  There is no evidence of any recurrence at this point in time.  His kidney function is stable.  His liver function is slightly improved.  I will not be making any change to his medical regimen.  I will see him back in the clinic again in 6 months.  At that point, he will be due for a DEXA scan.      In terms of working up his hip pain, I will start with pelvic films.      Thank you very much for allowing me to participate in the care of this patient.  If you have any questions regarding my recommendations, please do not hesitate to contact me.       Toñito Camejo MD      Professor of Medicine  [employer]      Executive Medical Director, Solid Organ Transplant Program  [facility]

## 2020-01-13 ENCOUNTER — MYC MEDICAL ADVICE (OUTPATIENT)
Dept: INTERNAL MEDICINE | Facility: CLINIC | Age: 55
End: 2020-01-13

## 2020-01-13 DIAGNOSIS — M15.0 PRIMARY OSTEOARTHRITIS INVOLVING MULTIPLE JOINTS: ICD-10-CM

## 2020-01-13 DIAGNOSIS — M06.9 RHEUMATOID ARTHRITIS INVOLVING MULTIPLE SITES, UNSPECIFIED RHEUMATOID FACTOR PRESENCE: ICD-10-CM

## 2020-01-13 NOTE — TELEPHONE ENCOUNTER
Reason For Call:        Chief Complaint   Patient presents with     Refill Request                 Medication Name, Dose and Monthly Quantity:   oxyCODONE (ROXICODONE) 5 MG tablet    Diagnosis requiring opiates:   Primary osteoarthritis involving multiple joints      Problem List Updated:   Yes     Opioid Agreement On File - [facility] PAIN CONTRACT ID# [id number]:       Last Urine Drug Screen (at least once every 12 months) Date:     Unexpected Results:        [place]  Data Reviewed (at least once every 3 months) Date:   06/03/19  Unexpected Results:         Last Fill Date:   10/19/2019  Next Fill Date:   01/13/2020  Last Visit with PCP:   11/27/2019    Future Visits with PCP:    Nothing scheduled  Processing:     RO BONNER CMA at 2:23 PM on 1/13/2020.

## 2020-01-14 RX ORDER — OXYCODONE HYDROCHLORIDE 5 MG/1
5 TABLET ORAL EVERY 4 HOURS PRN
Qty: 30 TABLET | Refills: 0 | Status: SHIPPED | OUTPATIENT
Start: 2020-01-14 | End: 2020-04-30

## 2020-01-24 ENCOUNTER — MYC REFILL (OUTPATIENT)
Dept: TRANSPLANT | Facility: CLINIC | Age: 55
End: 2020-01-24

## 2020-01-24 DIAGNOSIS — Z94.4 LIVER REPLACED BY TRANSPLANT (H): ICD-10-CM

## 2020-01-24 DIAGNOSIS — Z79.60 LONG-TERM USE OF IMMUNOSUPPRESSANT MEDICATION: ICD-10-CM

## 2020-01-27 ENCOUNTER — TELEPHONE (OUTPATIENT)
Dept: TRANSPLANT | Facility: CLINIC | Age: 55
End: 2020-01-27

## 2020-01-27 RX ORDER — PREDNISONE 2.5 MG/1
2.5 TABLET ORAL DAILY
Qty: 90 TABLET | Refills: 3 | Status: SHIPPED | OUTPATIENT
Start: 2020-01-27 | End: 2021-02-02

## 2020-01-27 NOTE — TELEPHONE ENCOUNTER
Patient Call: Transplant Illness  If the patient reports CHEST PAIN, SEVERE SHORTNESS OF BREATH, ONE SIDED WEAKNESS, or DIFFICULTY SPEAKING: CONTACT RN FACE-TO-FACE IMMEDIATELY    Duration of illness: Diarrhea  Transplanted organ? liver  Illness: Diarrhea

## 2020-01-28 ENCOUNTER — TELEPHONE (OUTPATIENT)
Dept: TRANSPLANT | Facility: CLINIC | Age: 55
End: 2020-01-28

## 2020-01-28 NOTE — TELEPHONE ENCOUNTER
Camacho has been experiencing diarrhea for the past 2 weeks. 5-10 per day, on average 5 per day. He has been taking Immodium to control his stools. He was seen in March 2019 by colo and rectal surgery because he was unable to get in touch with his transplant coordinator.     At that time stool cultures came back positive for plesiomonas shigelliodes and he completed a 5 day course of Cipro bid which effectively treated and diarrhea resolved.    Camacho would like to know if he should see a GI specialist and if so who Dr. Camejo would recommend. Message sent to Dr. Camejo.

## 2020-01-28 NOTE — TELEPHONE ENCOUNTER
Attempted to return Camacho' call regarding diarrhea. No answer, left message to call me back when he is able.

## 2020-01-29 ENCOUNTER — TELEPHONE (OUTPATIENT)
Dept: TRANSPLANT | Facility: CLINIC | Age: 55
End: 2020-01-29

## 2020-01-29 DIAGNOSIS — R19.7 DIARRHEA: Primary | ICD-10-CM

## 2020-01-29 NOTE — TELEPHONE ENCOUNTER
Dr. Camejo would like to order stool samples to evaluate Camacho' loose stools. Orders placed for stool for c-diff, pathogens, and WBC.     Attempted to reach Camacho with update. No answer, left a message to call me back for update.     *UPDATE*  Camacho returned my call. He will  [facility] stool [facility] and return them as soon as he can. We will follow up once results are in.

## 2020-01-31 ENCOUNTER — HOSPITAL ENCOUNTER (OUTPATIENT)
Dept: LAB | Facility: CLINIC | Age: 55
Discharge: HOME | End: 2020-01-31
Attending: INTERNAL MEDICINE
Payer: COMMERCIAL

## 2020-01-31 DIAGNOSIS — Z12.5 SPECIAL SCREENING FOR MALIGNANT NEOPLASM OF PROSTATE: ICD-10-CM

## 2020-01-31 DIAGNOSIS — Z13.220 LIPID SCREENING: ICD-10-CM

## 2020-01-31 DIAGNOSIS — Z94.4 LIVER REPLACED BY TRANSPLANT (H): ICD-10-CM

## 2020-01-31 DIAGNOSIS — N18.2 CKD (CHRONIC KIDNEY DISEASE) STAGE 2, GFR 60-89 ML/MIN: ICD-10-CM

## 2020-01-31 LAB
ALBUMIN SERPL-MCNC: 4.1 G/DL (ref 3.4–5)
ALP SERPL-CCNC: 246 U/L (ref 40–150)
ALT SERPL W P-5'-P-CCNC: 42 U/L (ref 0–70)
ANION GAP SERPL CALCULATED.3IONS-SCNC: 6 MMOL/L (ref 3–14)
AST SERPL W P-5'-P-CCNC: 19 U/L (ref 0–45)
BILIRUB DIRECT SERPL-MCNC: 0.3 MG/DL (ref 0–0.2)
BILIRUB SERPL-MCNC: 0.9 MG/DL (ref 0.2–1.3)
BUN SERPL-MCNC: 48 MG/DL (ref 7–30)
CALCIUM SERPL-MCNC: 9 MG/DL (ref 8.5–10.1)
CHLORIDE SERPL-SCNC: 105 MMOL/L (ref 94–109)
CO2 SERPL-SCNC: 24 MMOL/L (ref 20–32)
CREAT SERPL-MCNC: 1.54 MG/DL (ref 0.66–1.25)
CREAT UR-MCNC: 108 MG/DL
ERYTHROCYTE [DISTWIDTH] IN BLOOD BY AUTOMATED COUNT: 13.6 % (ref 10–15)
GFR SERPL CREATININE-BSD FRML MDRD: 50 ML/MIN/{1.73_M2}
GLUCOSE SERPL-MCNC: 200 MG/DL (ref 70–99)
HCT VFR BLD AUTO: 36.3 % (ref 40–53)
HGB BLD-MCNC: 12.2 G/DL (ref 13.3–17.7)
MCH RBC QN AUTO: 31.2 PG (ref 26.5–33)
MCHC RBC AUTO-ENTMCNC: 33.6 G/DL (ref 31.5–36.5)
MCV RBC AUTO: 93 FL (ref 78–100)
PHOSPHATE SERPL-MCNC: 3.7 MG/DL (ref 2.5–4.5)
PLATELET # BLD AUTO: 114 10E9/L (ref 150–450)
POTASSIUM SERPL-SCNC: 4.9 MMOL/L (ref 3.4–5.3)
PROT SERPL-MCNC: 7.1 G/DL (ref 6.8–8.8)
PROT UR-MCNC: 0.3 G/L
PROT/CREAT 24H UR: 0.28 G/G CR (ref 0–0.2)
PSA SERPL-ACNC: 0.56 UG/L (ref 0–4)
RBC # BLD AUTO: 3.91 10E12/L (ref 4.4–5.9)
SODIUM SERPL-SCNC: 135 MMOL/L (ref 133–144)
TACROLIMUS BLD-MCNC: 4.6 UG/L (ref 5–15)
TME LAST DOSE: ABNORMAL H
WBC # BLD AUTO: 7 10E9/L (ref 4–11)

## 2020-01-31 PROCEDURE — 80069 RENAL FUNCTION PANEL: CPT

## 2020-01-31 PROCEDURE — 82247 BILIRUBIN TOTAL: CPT

## 2020-01-31 PROCEDURE — 84156 ASSAY OF PROTEIN URINE: CPT

## 2020-01-31 PROCEDURE — 84450 TRANSFERASE (AST) (SGOT): CPT

## 2020-01-31 PROCEDURE — 84075 ASSAY ALKALINE PHOSPHATASE: CPT

## 2020-01-31 PROCEDURE — 82248 BILIRUBIN DIRECT: CPT

## 2020-01-31 PROCEDURE — G0103 PSA SCREENING: HCPCS

## 2020-01-31 PROCEDURE — 84460 ALANINE AMINO (ALT) (SGPT): CPT

## 2020-01-31 PROCEDURE — 84155 ASSAY OF PROTEIN SERUM: CPT

## 2020-01-31 PROCEDURE — 80197 ASSAY OF TACROLIMUS: CPT | Performed by: INTERNAL MEDICINE

## 2020-01-31 PROCEDURE — 36415 COLL VENOUS BLD VENIPUNCTURE: CPT

## 2020-01-31 PROCEDURE — 85027 COMPLETE CBC AUTOMATED: CPT

## 2020-01-31 PROCEDURE — 80048 BASIC METABOLIC PNL TOTAL CA: CPT

## 2020-02-07 ENCOUNTER — MYC REFILL (OUTPATIENT)
Dept: NEPHROLOGY | Facility: CLINIC | Age: 55
End: 2020-02-07

## 2020-02-07 DIAGNOSIS — E87.5 HYPERKALEMIA: ICD-10-CM

## 2020-02-07 DIAGNOSIS — R80.1 PERSISTENT PROTEINURIA: ICD-10-CM

## 2020-02-07 DIAGNOSIS — I10 BENIGN ESSENTIAL HYPERTENSION: ICD-10-CM

## 2020-02-07 RX ORDER — FUROSEMIDE 40 MG
40 TABLET ORAL 2 TIMES DAILY
Qty: 180 TABLET | Refills: 3 | Status: SHIPPED | OUTPATIENT
Start: 2020-02-07 | End: 2021-02-19

## 2020-02-18 ENCOUNTER — TELEPHONE (OUTPATIENT)
Dept: NEPHROLOGY | Facility: CLINIC | Age: 55
End: 2020-02-18

## 2020-02-19 ENCOUNTER — OFFICE VISIT (OUTPATIENT)
Dept: NEPHROLOGY | Facility: CLINIC | Age: 55
End: 2020-02-19
Payer: COMMERCIAL

## 2020-02-19 VITALS
HEART RATE: 80 BPM | BODY MASS INDEX: 32.28 KG/M2 | DIASTOLIC BLOOD PRESSURE: 88 MMHG | SYSTOLIC BLOOD PRESSURE: 144 MMHG | WEIGHT: 238 LBS | OXYGEN SATURATION: 100 %

## 2020-02-19 DIAGNOSIS — I10 ESSENTIAL HYPERTENSION: ICD-10-CM

## 2020-02-19 DIAGNOSIS — N18.30 CKD (CHRONIC KIDNEY DISEASE) STAGE 3, GFR 30-59 ML/MIN (H): Primary | ICD-10-CM

## 2020-02-19 DIAGNOSIS — N18.30 ANEMIA OF CHRONIC RENAL FAILURE, STAGE 3 (MODERATE) (H): ICD-10-CM

## 2020-02-19 DIAGNOSIS — D63.1 ANEMIA OF CHRONIC RENAL FAILURE, STAGE 3 (MODERATE) (H): ICD-10-CM

## 2020-02-19 DIAGNOSIS — E55.9 VITAMIN D DEFICIENCY: ICD-10-CM

## 2020-02-19 ASSESSMENT — PAIN SCALES - GENERAL: PAINLEVEL: NO PAIN (0)

## 2020-02-19 NOTE — LETTER
2/19/2020       RE: Camacho Bhagat  [address]  [facility], [place] [address]     Dear Colleague,    Thank you for referring your patient, Camacho Bhagat, to the [facility] NEPHROLOGY at [facility]. Please see a copy of my visit note below.    Nephrology Clinic Visit 2/19/20    Assessment and Plan:       1. CKD2 w/mild proteinuria -  Relatively stable. Creat 1.5, eGFR 50 ml/mn. UPCR 0.2 g/gCr.    Baseline remains in the low to mid 1 range.    Etiology of CKD likely multifactorial; DM, recurrent EJ, CNI.    - Had been intolerant of ACE/ARB previously given problems with hyperkalemia assoc with Tacrolimus, but retrial when TAC level was <0.3 did not result in Hyperkalemia. In fact Valtessa had been discontinued because he was becoming hypokalemic.    - Patient was restarted on ARB in 4/18 for unexplained nephrotic range proteinuria following ostomy takedown with improvement in UPCR, but then developed mild acute rejection and Tac dose was increased resulting in hyperkalemia. He was restarted on Valtassa with improvement in hyperkalemia and Losartan was discontinued again in 3/19   - Potassium has been controlled since 4/19 off Losartan. UPCR looks good today despite blood pressures still not being at goal    - Blood pressure goal < 140/80.    - Does not use NSAIDs.    - A1c goal is 7%. HgbA1c 5.8% (11/19)      2. HTN - Uncontrolled. Home blood pressures 150's/80. Clinic readings 144-145/86-88. No edema. No dyspnea or CP. His weight is becoming more of a problem contributing to his rising blood pressures.    Current antihypertensive regimen:      Lasix 40 mg bid     Amlodipine 10 mg qd      Toprol  mg every day    - Would have added Doxazosin, however patient wishes to work on weight loss first   - Continue to monitor blood pressures       3. Hyperkalemia assoc with Tac/ARB - K 4.9 and has been acceptable since stopping Losartan in March 2019.    - Currently on Valtassa  8.4 g/day.    - He tries to modify his diet w/respect to K foods      4. Volume status - Euvolemic. No edema/dyspnea. Weight 238 # today, down from 243# last visit 8/19. Was 220# in 12/18. Blood pressures uncontrolled. Weight gain is not fluid related. He is gaining body weight   - Continue Lasix 40 mg bid.       5. Acid base - No acute concerns. Metabolic acidosis resolved following ileostomy takedown. Bicarb 24       6. BMD - Ca 9.0, Phos 3.7, albumin 4.1   - Vitamin D 43, PTH 22 (8/19)    - Continue Vit D 1000 U qd      7.Anemia -Hgb 12.2.    - No recent iron studies   - Had colonoscopy 2017 (poor study), Ileoscopy 8/17 - nml   - Not on iron and has not needed DIPAK      8. Liver transplant IS: Tacrolimus, MMF, Pred. Tac level  4.6      9. Dispo- RCT  3 months for f/u if weight loss has not resulted in improved blood pressure control will begin Doxazosin      Reason for Visit:  CKD2/3/HTN follow up        HPI:  Mr Bhagat is a 53 yo male with CKD2, HTN, Chronic hyperkalemia, Liver transplant, in today for CKD/HTN followup. Last seen in clinic by me on  8/7/19. Since our last visit patient was asked to increase his Toprol XL to 200 mg every day. Valtessa decreased to 8.4 g/day.   Baseline creat low 1's.       Interval Hx:     No hospital admissions.       ROS:  Having more arthritis pain. Pain in hip/neck. Not taking NSAIDs  Home blood pressures in the 150/80 range  Appetite is good  No edema.   Denies dyspnea,  CP, abdominal pain. Voiding w/o difficulty.          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 Former smoker,   by profession. Exercising  regularly.  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     Cervical Cancer Maternal Grandmother      Cerebrovascular Disease Maternal Grandfather      No Known Problems Paternal Grandmother      Prostate Cancer Paternal Grandfather      Colon Cancer No family hx of      Hyperlipidemia No family hx of      Coronary Artery Disease No family hx of      Breast Cancer No family hx of        Allergies:  Allergies   Allergen Reactions     Erythromycin GI Disturbance     Vioxx      Nausea, vomiting       Medications:  Current Outpatient Medications   Medication Sig     amLODIPine (NORVASC) 10 MG tablet Take 1 tablet (10 mg) by mouth daily     cholecalciferol (VITAMIN D3) 1000 UNIT tablet Take 1 tablet (1,000 Units) by mouth daily (Patient taking differently: Take 1,000 Units by mouth every morning )     CIALIS 5 MG tablet Take 5 mg by mouth as needed      cyclobenzaprine (FLEXERIL) 10 MG tablet Take 1 tablet (10 mg) by mouth 3 times daily as needed for muscle spasms     fluticasone (FLONASE) 50 MCG/ACT nasal spray Spray 1 spray into both nostrils daily     furosemide (LASIX) 40 MG tablet Take 1 tablet (40 mg) by mouth 2 times daily     gabapentin (NEURONTIN) 300 MG capsule Take 1 capsule (300 mg) by mouth 3 times daily     Glucose Blood (BLOOD GLUCOSE TEST STRIPS) STRP 1 strip by Lancet route 3 times daily     insulin glargine (LANTUS SOLOSTAR) 100 UNIT/ML pen Inject 55 Units Subcutaneous every morning     Insulin Lispro (HUMALOG KWIKPEN) 200 UNIT/ML soln Use one unit per 3 grams carbohydrates for all meals and snacks. Total daily dose up to 80 U.     insulin pen needle (BD ENE U/F) 32G X 4 MM miscellaneous Use 1 pen needle 4 times daily or as directed.     metoprolol succinate ER (TOPROL-XL) 200 MG 24 hr tablet  Take 1 tablet (200 mg) by mouth daily Take a total of 150 mg daily     multivitamin, therapeutic with minerals (MULTI-VITAMIN) TABS tablet Take 1 tablet by mouth every morning     mycophenolic acid (GENERIC EQUIVALENT) 360 MG EC tablet Take 2 tablets (720 mg) by mouth every 12 hours     omeprazole (PRILOSEC) 20 MG DR capsule Take 1 capsule (20 mg) by mouth daily     oxyCODONE (ROXICODONE) 5 MG tablet Take 1 tablet (5 mg) by mouth every 4 hours as needed for pain maximum 6 tablet(s) per day     patiromer (VELTASSA) 8.4 g packet Take 8.4 g by mouth daily     predniSONE (DELTASONE) 2.5 MG tablet Take 1 tablet (2.5 mg) by mouth daily     tacrolimus (GENERIC EQUIVALENT) 1 MG capsule Take 4 capsules (4 mg) by mouth every 12 hours (Patient taking differently: Take 5 mg by mouth every 12 hours )     ursodiol (ACTIGALL) 500 MG tablet Take 1 tablet (500 mg) by mouth 2 times daily     No current facility-administered medications for this visit.       Vitals:  BP (!) 144/88   Pulse 80   Wt 108 kg (238 lb)   SpO2 100%   BMI 32.28 kg/m       Exam:    Gen: Well groomed, pleasant  CARDIAC: RRR  LUNGS: CTA  ABDOMEN: soft, NT  EXT: No edema  NEURO: Alert, oriented.    LABS:   CMP  Recent Labs   Lab Test 01/31/20  1230 11/26/19  1222 10/02/19  1232 09/06/19  1248    142 139 137   POTASSIUM 4.9 5.2 5.8* 5.2   CHLORIDE 105 114* 110* 108   CO2 24 23 26 25   ANIONGAP 6 5 3 4   * 103* 228* 196*   BUN 48* 50* 50* 52*   CR 1.54* 1.41* 1.53* 1.47*   GFRESTIMATED 50* 56* 50* 53*   GFRESTBLACK 58* 64 58* 61   JACOB 9.0 9.3 9.5 9.0     Recent Labs   Lab Test 01/31/20  1230 11/26/19  1222 10/02/19  1232 08/06/19  1234   BILITOTAL 0.9 0.8 1.0 0.8   ALKPHOS 246* 216* 223* 232*   ALT 42 37 40 38   AST 19 21 28 25     CBC  Recent Labs   Lab Test 01/31/20  1230 11/26/19  1222 10/02/19  1232 08/06/19  1234   HGB 12.2* 12.6* 12.5* 12.4*   WBC 7.0 6.2 5.6 5.6   RBC 3.91* 3.95* 3.91* 3.96*   HCT 36.3* 37.5* 36.3* 37.3*   MCV 93 95 93 94    MCH 31.2 31.9 32.0 31.3   MCHC 33.6 33.6 34.4 33.2   RDW 13.6 13.4 13.9 14.5   * 95* 91* 91*     URINE STUDIES  Recent Labs   Lab Test 03/27/19  1155 04/11/18  1446 02/26/18  1115 01/29/18  1235   COLOR Yellow Yellow Yellow Yellow   APPEARANCE Clear Slightly Cloudy Clear Clear   URINEGLC 70* >499* >499* 150*   URINEBILI Negative Negative Negative Negative   URINEKETONE Negative Negative Negative Negative   SG 1.014 1.010 1.010 1.013   UBLD Negative Small* Negative Small*   URINEPH 5.0 5.0 5.0 6.0   PROTEIN 30* 100* 100* >499*   NITRITE Negative Negative Negative Negative   LEUKEST Negative Negative Negative Negative   RBCU 1 3* 2 4*   WBCU 0 <1 1 4*     Recent Labs   Lab Test 01/31/20  1220 08/06/19  1230 03/27/19  1155 11/27/18  1110   UTPG 0.28* 0.96* 0.41* 0.31*     PTH  Recent Labs   Lab Test 08/06/19  1234 10/10/18  1059 09/18/18  1212   PTHI 22 22 28     IRON STUDIES  Recent Labs   Lab Test 07/10/18  1022 03/06/18  1029 02/05/18  1108   IRON 166 143 44   * 228* 166*   IRONSAT 73* 63* 26   NELY 1,338* 1,362* 1,014*       Cinda Cazares, NP

## 2020-02-19 NOTE — NURSING NOTE
Chief Complaint   Patient presents with     RECHECK     6 months follow up     Blood pressure (!) 144/88, pulse 80, weight 108 kg (238 lb), SpO2 100 %.    Ashley Bond/DINORA  February 19, 2020 1:33 PM

## 2020-02-19 NOTE — PROGRESS NOTES
Nephrology Clinic Visit 2/19/20    Assessment and Plan:       1. CKD2 w/mild proteinuria - Relatively stable. Creat 1.5, eGFR 50 ml/mn. UPCR 0.2 g/gCr.    Baseline remains in the low to mid 1 range.    Etiology of CKD likely multifactorial; DM, recurrent EJ, CNI.    - Had been intolerant of ACE/ARB previously given problems with hyperkalemia assoc with Tacrolimus, but retrial when TAC level was <0.3 did not result in Hyperkalemia. In fact Valtessa had been discontinued because he was becoming hypokalemic.    - Patient was restarted on ARB in 4/18 for unexplained nephrotic range proteinuria following ostomy takedown with improvement in UPCR, but then developed mild acute rejection and Tac dose was increased resulting in hyperkalemia. He was restarted on Valtassa with improvement in hyperkalemia and Losartan was discontinued again in 3/19   - Potassium has been controlled since 4/19 off Losartan. UPCR looks good today despite blood pressures still not being at goal    - Blood pressure goal < 140/80.    - Does not use NSAIDs.    - A1c goal is 7%. HgbA1c 5.8% (11/19)      2. HTN - Uncontrolled. Home blood pressures 150's/80. Clinic readings 144-145/86-88. No edema. No dyspnea or CP. His weight is becoming more of a problem contributing to his rising blood pressures.    Current antihypertensive regimen:      Lasix 40 mg bid     Amlodipine 10 mg qd      Toprol  mg every day    - Would have added Doxazosin, however patient wishes to work on weight loss first   - Continue to monitor blood pressures       3. Hyperkalemia assoc with Tac/ARB - K 4.9 and has been acceptable since stopping Losartan in March 2019.    - Currently on Valtassa 8.4 g/day.    - He tries to modify his diet w/respect to K foods      4. Volume status - Euvolemic. No edema/dyspnea. Weight 238 # today, down from 243# last visit 8/19. Was 220# in 12/18. Blood pressures uncontrolled. Weight gain is not fluid related. He is gaining body weight   -  Continue Lasix 40 mg bid.       5. Acid base - No acute concerns. Metabolic acidosis resolved following ileostomy takedown. Bicarb 24       6. BMD - Ca 9.0, Phos 3.7, albumin 4.1   - Vitamin D 43, PTH 22 (8/19)    - Continue Vit D 1000 U qd      7.Anemia -Hgb 12.2.    - No recent iron studies   - Had colonoscopy 2017 (poor study), Ileoscopy 8/17 - nml   - Not on iron and has not needed DIPAK      8. Liver transplant IS: Tacrolimus, MMF, Pred. Tac level 4.6      9. Dispo- RCT 3 months for f/u if weight loss has not resulted in improved blood pressure control will begin Doxazosin      Reason for Visit:  CKD2/3/HTN follow up        HPI:  Mr Bhagat is a 53 yo male with CKD2, HTN, Chronic hyperkalemia, Liver transplant, in today for CKD/HTN followup. Last seen in clinic by me on 8/7/19. Since our last visit patient was asked to increase his Toprol XL to 200 mg every day. Valtessa decreased to 8.4 g/day.   Baseline creat low 1's.       Interval Hx:     No hospital admissions.       ROS:  Having more arthritis pain. Pain in hip/neck. Not taking NSAIDs  Home blood pressures in the 150/80 range  Appetite is good  No edema.   Denies dyspnea,  CP, abdominal pain. Voiding w/o difficulty.          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 Former smoker,   by profession. Exercising regularly.  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     Cervical Cancer  Maternal Grandmother      Cerebrovascular Disease Maternal Grandfather      No Known Problems Paternal Grandmother      Prostate Cancer Paternal Grandfather      Colon Cancer No family hx of      Hyperlipidemia No family hx of      Coronary Artery Disease No family hx of      Breast Cancer No family hx of        Allergies:  Allergies   Allergen Reactions     Erythromycin GI Disturbance     Vioxx      Nausea, vomiting       Medications:  Current Outpatient Medications   Medication Sig     amLODIPine (NORVASC) 10 MG tablet Take 1 tablet (10 mg) by mouth daily     cholecalciferol (VITAMIN D3) 1000 UNIT tablet Take 1 tablet (1,000 Units) by mouth daily (Patient taking differently: Take 1,000 Units by mouth every morning )     CIALIS 5 MG tablet Take 5 mg by mouth as needed      cyclobenzaprine (FLEXERIL) 10 MG tablet Take 1 tablet (10 mg) by mouth 3 times daily as needed for muscle spasms     fluticasone (FLONASE) 50 MCG/ACT nasal spray Spray 1 spray into both nostrils daily     furosemide (LASIX) 40 MG tablet Take 1 tablet (40 mg) by mouth 2 times daily     gabapentin (NEURONTIN) 300 MG capsule Take 1 capsule (300 mg) by mouth 3 times daily     Glucose Blood (BLOOD GLUCOSE TEST STRIPS) STRP 1 strip by Lancet route 3 times daily     insulin glargine (LANTUS SOLOSTAR) 100 UNIT/ML pen Inject 55 Units Subcutaneous every morning     Insulin Lispro (HUMALOG KWIKPEN) 200 UNIT/ML soln Use one unit per 3 grams carbohydrates for all meals and snacks. Total daily dose up to 80 U.     insulin pen needle (BD ENE U/F) 32G X 4 MM miscellaneous Use 1 pen needle 4 times daily or as directed.     metoprolol succinate ER (TOPROL-XL) 200 MG 24 hr tablet Take 1 tablet (200 mg) by mouth daily Take a total of 150 mg daily     multivitamin, therapeutic with minerals (MULTI-VITAMIN) TABS tablet Take 1 tablet by mouth every morning     mycophenolic acid (GENERIC EQUIVALENT) 360 MG EC tablet Take 2 tablets (720 mg) by mouth every 12 hours      omeprazole (PRILOSEC) 20 MG DR capsule Take 1 capsule (20 mg) by mouth daily     oxyCODONE (ROXICODONE) 5 MG tablet Take 1 tablet (5 mg) by mouth every 4 hours as needed for pain maximum 6 tablet(s) per day     patiromer (VELTASSA) 8.4 g packet Take 8.4 g by mouth daily     predniSONE (DELTASONE) 2.5 MG tablet Take 1 tablet (2.5 mg) by mouth daily     tacrolimus (GENERIC EQUIVALENT) 1 MG capsule Take 4 capsules (4 mg) by mouth every 12 hours (Patient taking differently: Take 5 mg by mouth every 12 hours )     ursodiol (ACTIGALL) 500 MG tablet Take 1 tablet (500 mg) by mouth 2 times daily     No current facility-administered medications for this visit.       Vitals:  BP (!) 144/88   Pulse 80   Wt 108 kg (238 lb)   SpO2 100%   BMI 32.28 kg/m      Exam:    Gen: Well groomed, pleasant  CARDIAC: RRR  LUNGS: CTA  ABDOMEN: soft, NT  EXT: No edema  NEURO: Alert, oriented.    LABS:   CMP  Recent Labs   Lab Test 01/31/20  1230 11/26/19  1222 10/02/19  1232 09/06/19  1248    142 139 137   POTASSIUM 4.9 5.2 5.8* 5.2   CHLORIDE 105 114* 110* 108   CO2 24 23 26 25   ANIONGAP 6 5 3 4   * 103* 228* 196*   BUN 48* 50* 50* 52*   CR 1.54* 1.41* 1.53* 1.47*   GFRESTIMATED 50* 56* 50* 53*   GFRESTBLACK 58* 64 58* 61   JACOB 9.0 9.3 9.5 9.0     Recent Labs   Lab Test 01/31/20  1230 11/26/19  1222 10/02/19  1232 08/06/19  1234   BILITOTAL 0.9 0.8 1.0 0.8   ALKPHOS 246* 216* 223* 232*   ALT 42 37 40 38   AST 19 21 28 25     CBC  Recent Labs   Lab Test 01/31/20  1230 11/26/19  1222 10/02/19  1232 08/06/19  1234   HGB 12.2* 12.6* 12.5* 12.4*   WBC 7.0 6.2 5.6 5.6   RBC 3.91* 3.95* 3.91* 3.96*   HCT 36.3* 37.5* 36.3* 37.3*   MCV 93 95 93 94   MCH 31.2 31.9 32.0 31.3   MCHC 33.6 33.6 34.4 33.2   RDW 13.6 13.4 13.9 14.5   * 95* 91* 91*     URINE STUDIES  Recent Labs   Lab Test 03/27/19  1155 04/11/18  1446 02/26/18  1115 01/29/18  1235   COLOR Yellow Yellow Yellow Yellow   APPEARANCE Clear Slightly Cloudy Clear Clear    URINEGLC 70* >499* >499* 150*   URINEBILI Negative Negative Negative Negative   URINEKETONE Negative Negative Negative Negative   SG 1.014 1.010 1.010 1.013   UBLD Negative Small* Negative Small*   URINEPH 5.0 5.0 5.0 6.0   PROTEIN 30* 100* 100* >499*   NITRITE Negative Negative Negative Negative   LEUKEST Negative Negative Negative Negative   RBCU 1 3* 2 4*   WBCU 0 <1 1 4*     Recent Labs   Lab Test 01/31/20  1220 08/06/19  1230 03/27/19  1155 11/27/18  1110   UTPG 0.28* 0.96* 0.41* 0.31*     PTH  Recent Labs   Lab Test 08/06/19  1234 10/10/18  1059 09/18/18  1212   PTHI 22 22 28     IRON STUDIES  Recent Labs   Lab Test 07/10/18  1022 03/06/18  1029 02/05/18  1108   IRON 166 143 44   * 228* 166*   IRONSAT 73* 63* 26   NELY 1,338* 1,362* 1,014*       Cinda Cazares, NP

## 2020-02-21 ENCOUNTER — ANCILLARY PROCEDURE (OUTPATIENT)
Dept: BONE DENSITY | Facility: CLINIC | Age: 55
End: 2020-02-21
Attending: INTERNAL MEDICINE
Payer: COMMERCIAL

## 2020-02-21 DIAGNOSIS — Z94.4 LIVER REPLACED BY TRANSPLANT (H): ICD-10-CM

## 2020-03-19 DIAGNOSIS — Z94.4 LIVER TRANSPLANT RECIPIENT (H): ICD-10-CM

## 2020-03-19 RX ORDER — TACROLIMUS 1 MG/1
5 CAPSULE ORAL EVERY 12 HOURS
Qty: 300 CAPSULE | Refills: 11 | Status: SHIPPED | OUTPATIENT
Start: 2020-03-19 | End: 2020-05-18

## 2020-04-01 ENCOUNTER — TELEPHONE (OUTPATIENT)
Dept: TRANSPLANT | Facility: CLINIC | Age: 55
End: 2020-04-01

## 2020-04-01 DIAGNOSIS — Z94.4 LIVER TRANSPLANT RECIPIENT (H): Primary | ICD-10-CM

## 2020-04-01 NOTE — TELEPHONE ENCOUNTER
Annual chart review.    Camacho is up to date on the majority of the post transplant recommended health maintenance. There is no Dermatology visit documented. Will send Camacho a [employer] messages asking if this has been done at an outside facility and if not, we can place a referral for [facility].    *UPDATE*  Camacho responded via [employer] that he would like to schedule an appt. Derm referral placed for [facility] and contact number provided.

## 2020-04-02 ENCOUNTER — HOSPITAL ENCOUNTER (OUTPATIENT)
Dept: LAB | Facility: CLINIC | Age: 55
Discharge: HOME | End: 2020-04-02
Attending: INTERNAL MEDICINE | Admitting: INTERNAL MEDICINE
Payer: COMMERCIAL

## 2020-04-02 DIAGNOSIS — Z13.220 LIPID SCREENING: ICD-10-CM

## 2020-04-02 DIAGNOSIS — Z94.4 LIVER REPLACED BY TRANSPLANT (H): ICD-10-CM

## 2020-04-02 DIAGNOSIS — N18.2 CKD (CHRONIC KIDNEY DISEASE) STAGE 2, GFR 60-89 ML/MIN: ICD-10-CM

## 2020-04-02 LAB
ALBUMIN SERPL-MCNC: 3.9 G/DL (ref 3.4–5)
ALBUMIN UR-MCNC: 10 MG/DL
ALP SERPL-CCNC: 271 U/L (ref 40–150)
ALT SERPL W P-5'-P-CCNC: 44 U/L (ref 0–70)
ANION GAP SERPL CALCULATED.3IONS-SCNC: 5 MMOL/L (ref 3–14)
APPEARANCE UR: CLEAR
AST SERPL W P-5'-P-CCNC: 29 U/L (ref 0–45)
BILIRUB DIRECT SERPL-MCNC: 0.3 MG/DL (ref 0–0.2)
BILIRUB SERPL-MCNC: 0.9 MG/DL (ref 0.2–1.3)
BILIRUB UR QL STRIP: NEGATIVE
BUN SERPL-MCNC: 56 MG/DL (ref 7–30)
CALCIUM SERPL-MCNC: 8.7 MG/DL (ref 8.5–10.1)
CHLORIDE SERPL-SCNC: 109 MMOL/L (ref 94–109)
CHOLEST SERPL-MCNC: 147 MG/DL
CO2 SERPL-SCNC: 21 MMOL/L (ref 20–32)
COLOR UR AUTO: ABNORMAL
CREAT SERPL-MCNC: 1.49 MG/DL (ref 0.66–1.25)
CREAT UR-MCNC: 69 MG/DL
ERYTHROCYTE [DISTWIDTH] IN BLOOD BY AUTOMATED COUNT: 12.9 % (ref 10–15)
GFR SERPL CREATININE-BSD FRML MDRD: 52 ML/MIN/{1.73_M2}
GLUCOSE SERPL-MCNC: 155 MG/DL (ref 70–99)
GLUCOSE UR STRIP-MCNC: NEGATIVE MG/DL
HCT VFR BLD AUTO: 37.3 % (ref 40–53)
HDLC SERPL-MCNC: 26 MG/DL
HGB BLD-MCNC: 12.4 G/DL (ref 13.3–17.7)
HGB UR QL STRIP: NEGATIVE
HYALINE CASTS #/AREA URNS LPF: 2 /LPF (ref 0–2)
KETONES UR STRIP-MCNC: NEGATIVE MG/DL
LDLC SERPL CALC-MCNC: ABNORMAL MG/DL
LEUKOCYTE ESTERASE UR QL STRIP: NEGATIVE
MCH RBC QN AUTO: 30.8 PG (ref 26.5–33)
MCHC RBC AUTO-ENTMCNC: 33.2 G/DL (ref 31.5–36.5)
MCV RBC AUTO: 93 FL (ref 78–100)
MUCOUS THREADS #/AREA URNS LPF: PRESENT /LPF
NITRATE UR QL: NEGATIVE
NONHDLC SERPL-MCNC: 121 MG/DL
PH UR STRIP: 5 PH (ref 5–7)
PHOSPHATE SERPL-MCNC: 2.7 MG/DL (ref 2.5–4.5)
PLATELET # BLD AUTO: 101 10E9/L (ref 150–450)
POTASSIUM SERPL-SCNC: 4.5 MMOL/L (ref 3.4–5.3)
PROT SERPL-MCNC: 7.4 G/DL (ref 6.8–8.8)
PROT UR-MCNC: 0.23 G/L
PROT/CREAT 24H UR: 0.34 G/G CR (ref 0–0.2)
RBC # BLD AUTO: 4.03 10E12/L (ref 4.4–5.9)
RBC #/AREA URNS AUTO: <1 /HPF (ref 0–2)
SODIUM SERPL-SCNC: 135 MMOL/L (ref 133–144)
SOURCE: ABNORMAL
SP GR UR STRIP: 1.01 (ref 1–1.03)
SQUAMOUS #/AREA URNS AUTO: <1 /HPF (ref 0–1)
TACROLIMUS BLD-MCNC: 5.7 UG/L (ref 5–15)
TME LAST DOSE: NORMAL H
TRIGL SERPL-MCNC: 432 MG/DL
UROBILINOGEN UR STRIP-MCNC: NORMAL MG/DL (ref 0–2)
WBC # BLD AUTO: 6.4 10E9/L (ref 4–11)
WBC #/AREA URNS AUTO: <1 /HPF (ref 0–5)

## 2020-04-02 PROCEDURE — 36415 COLL VENOUS BLD VENIPUNCTURE: CPT | Performed by: INTERNAL MEDICINE

## 2020-04-02 PROCEDURE — 80069 RENAL FUNCTION PANEL: CPT | Performed by: INTERNAL MEDICINE

## 2020-04-02 PROCEDURE — 85027 COMPLETE CBC AUTOMATED: CPT | Performed by: INTERNAL MEDICINE

## 2020-04-02 PROCEDURE — 81001 URINALYSIS AUTO W/SCOPE: CPT | Performed by: INTERNAL MEDICINE

## 2020-04-02 PROCEDURE — 80076 HEPATIC FUNCTION PANEL: CPT | Performed by: INTERNAL MEDICINE

## 2020-04-02 PROCEDURE — 82248 BILIRUBIN DIRECT: CPT | Performed by: INTERNAL MEDICINE

## 2020-04-02 PROCEDURE — 84450 TRANSFERASE (AST) (SGOT): CPT | Performed by: INTERNAL MEDICINE

## 2020-04-02 PROCEDURE — 80197 ASSAY OF TACROLIMUS: CPT | Performed by: INTERNAL MEDICINE

## 2020-04-02 PROCEDURE — 84460 ALANINE AMINO (ALT) (SGPT): CPT | Performed by: INTERNAL MEDICINE

## 2020-04-02 PROCEDURE — 82247 BILIRUBIN TOTAL: CPT | Performed by: INTERNAL MEDICINE

## 2020-04-02 PROCEDURE — 84156 ASSAY OF PROTEIN URINE: CPT | Performed by: INTERNAL MEDICINE

## 2020-04-02 PROCEDURE — 80061 LIPID PANEL: CPT | Performed by: INTERNAL MEDICINE

## 2020-04-02 PROCEDURE — 84075 ASSAY ALKALINE PHOSPHATASE: CPT | Performed by: INTERNAL MEDICINE

## 2020-04-02 PROCEDURE — 84155 ASSAY OF PROTEIN SERUM: CPT | Performed by: INTERNAL MEDICINE

## 2020-04-02 PROCEDURE — 80048 BASIC METABOLIC PNL TOTAL CA: CPT | Performed by: INTERNAL MEDICINE

## 2020-04-03 ENCOUNTER — TELEPHONE (OUTPATIENT)
Dept: TRANSPLANT | Facility: CLINIC | Age: 55
End: 2020-04-03

## 2020-04-03 DIAGNOSIS — Z94.4 LIVER TRANSPLANT RECIPIENT (H): Primary | ICD-10-CM

## 2020-04-03 NOTE — TELEPHONE ENCOUNTER
Reviewed Camacho' labs with him over the phone. Labs are stable for Camacho. Lipid panel was elevated but was drawn non fasting and will need to be repeated. Camacho said he will be getting labs for Nephrology in about a month and could repeat them at that time.     Labs will be drawn at [facility] and new order placed in [facility].

## 2020-04-03 NOTE — TELEPHONE ENCOUNTER
Returned Camacho' call.    He wanted to discuss lab results again. He said that around 3/20/2020 he had (R) upper quadrant pain that resolved on it's own in about a week. He is also concerned about his liver function tests trending up. We reviewed ALT and AST over the past year and it appears to be within his baseline. Alk Phos is slightly up this lab draw but he has been near this number in the past as well. Will be sure Dr. Camejo is aware of ABD pain and lab results.     Camacho is getting labs drawn in 1 month for Nephrology and wants to know if he should repeat his liver function tests then or just wait 2-3 months per regular schedule.     Message forwarded to Dr. Camejo for review.

## 2020-04-06 ENCOUNTER — TELEPHONE (OUTPATIENT)
Dept: TRANSPLANT | Facility: CLINIC | Age: 55
End: 2020-04-06

## 2020-04-06 NOTE — TELEPHONE ENCOUNTER
Dr. Camejo reviewed 4/2/2020 lab results. He is not concerned about his liver function tests but since Camacho is having labs drawn in 1 month for Nephrology it would be fine to recheck his liver transplant labs as well.    Attempted to reach Camacho with this information, no answer. Left a detailed message that he should have labs redrawn in 1 month. Will also send a mychart message.

## 2020-04-30 ENCOUNTER — MYC REFILL (OUTPATIENT)
Dept: INTERNAL MEDICINE | Facility: CLINIC | Age: 55
End: 2020-04-30

## 2020-04-30 DIAGNOSIS — M15.0 PRIMARY OSTEOARTHRITIS INVOLVING MULTIPLE JOINTS: ICD-10-CM

## 2020-04-30 DIAGNOSIS — M06.9 RHEUMATOID ARTHRITIS INVOLVING MULTIPLE SITES, UNSPECIFIED RHEUMATOID FACTOR PRESENCE: ICD-10-CM

## 2020-05-01 RX ORDER — OXYCODONE HYDROCHLORIDE 5 MG/1
5 TABLET ORAL EVERY 4 HOURS PRN
Qty: 30 TABLET | Refills: 0 | Status: SHIPPED | OUTPATIENT
Start: 2020-05-01 | End: 2020-10-04

## 2020-05-01 NOTE — TELEPHONE ENCOUNTER
Reason For Call:        Chief Complaint   Patient presents with     Refill Request                 Medication Name, Dose and Monthly Quantity:   oxyCODONE (ROXICODONE) 5 MG tablet    Diagnosis requiring opiates:   Primary osteoarthritis involving multiple joints      Problem List Updated:   Yes     Opioid Agreement On File - [facility] PAIN CONTRACT ID# [id number]:       Last Urine Drug Screen (at least once every 12 months) Date:     Unexpected Results:        [place]  Data Reviewed (at least once every 3 months) Date:   06/03/19  Unexpected Results:         Last Fill Date:   01/14/2020  Next Fill Date:   05/01/2020  Last Visit with PCP:   11/27/2019    Future Visits with PCP:    Nothing scheduled  Processing:     RO BONNER CMA at 11:22 AM on 5/1/2020.

## 2020-05-07 ENCOUNTER — TELEPHONE (OUTPATIENT)
Dept: ENDOCRINOLOGY | Facility: CLINIC | Age: 55
End: 2020-05-07

## 2020-05-08 ENCOUNTER — HOSPITAL ENCOUNTER (OUTPATIENT)
Dept: LAB | Facility: CLINIC | Age: 55
Discharge: HOME | End: 2020-05-08
Attending: INTERNAL MEDICINE
Payer: COMMERCIAL

## 2020-05-08 DIAGNOSIS — N18.2 CKD (CHRONIC KIDNEY DISEASE) STAGE 2, GFR 60-89 ML/MIN: ICD-10-CM

## 2020-05-08 DIAGNOSIS — Z94.4 LIVER TRANSPLANT RECIPIENT (H): ICD-10-CM

## 2020-05-08 DIAGNOSIS — Z13.220 LIPID SCREENING: ICD-10-CM

## 2020-05-08 DIAGNOSIS — N18.30 CKD (CHRONIC KIDNEY DISEASE) STAGE 3, GFR 30-59 ML/MIN (H): ICD-10-CM

## 2020-05-08 DIAGNOSIS — Z94.4 LIVER REPLACED BY TRANSPLANT (H): ICD-10-CM

## 2020-05-08 LAB
ALBUMIN SERPL-MCNC: 3.9 G/DL (ref 3.4–5)
ALP SERPL-CCNC: 283 U/L (ref 40–150)
ALT SERPL W P-5'-P-CCNC: 40 U/L (ref 0–70)
ANION GAP SERPL CALCULATED.3IONS-SCNC: 5 MMOL/L (ref 3–14)
AST SERPL W P-5'-P-CCNC: 25 U/L (ref 0–45)
BILIRUB DIRECT SERPL-MCNC: 0.2 MG/DL (ref 0–0.2)
BILIRUB SERPL-MCNC: 0.8 MG/DL (ref 0.2–1.3)
BUN SERPL-MCNC: 45 MG/DL (ref 7–30)
CALCIUM SERPL-MCNC: 8.9 MG/DL (ref 8.5–10.1)
CHLORIDE SERPL-SCNC: 110 MMOL/L (ref 94–109)
CHOLEST SERPL-MCNC: 141 MG/DL
CO2 SERPL-SCNC: 22 MMOL/L (ref 20–32)
CREAT SERPL-MCNC: 1.42 MG/DL (ref 0.66–1.25)
CREAT UR-MCNC: 92 MG/DL
ERYTHROCYTE [DISTWIDTH] IN BLOOD BY AUTOMATED COUNT: 13.5 % (ref 10–15)
GFR SERPL CREATININE-BSD FRML MDRD: 55 ML/MIN/{1.73_M2}
GLUCOSE SERPL-MCNC: 94 MG/DL (ref 70–99)
HCT VFR BLD AUTO: 36.6 % (ref 40–53)
HDLC SERPL-MCNC: 33 MG/DL
HGB BLD-MCNC: 12 G/DL (ref 13.3–17.7)
LDLC SERPL CALC-MCNC: 35 MG/DL
MCH RBC QN AUTO: 30.6 PG (ref 26.5–33)
MCHC RBC AUTO-ENTMCNC: 32.8 G/DL (ref 31.5–36.5)
MCV RBC AUTO: 93 FL (ref 78–100)
NONHDLC SERPL-MCNC: 108 MG/DL
PHOSPHATE SERPL-MCNC: 2.8 MG/DL (ref 2.5–4.5)
PLATELET # BLD AUTO: 100 10E9/L (ref 150–450)
POTASSIUM SERPL-SCNC: 4.8 MMOL/L (ref 3.4–5.3)
PROT SERPL-MCNC: 7.3 G/DL (ref 6.8–8.8)
PROT UR-MCNC: 0.37 G/L
PROT/CREAT 24H UR: 0.41 G/G CR (ref 0–0.2)
RBC # BLD AUTO: 3.92 10E12/L (ref 4.4–5.9)
SODIUM SERPL-SCNC: 137 MMOL/L (ref 133–144)
TACROLIMUS BLD-MCNC: 8.5 UG/L (ref 5–15)
TME LAST DOSE: NORMAL H
TRIGL SERPL-MCNC: 365 MG/DL
WBC # BLD AUTO: 5.7 10E9/L (ref 4–11)

## 2020-05-08 PROCEDURE — 82248 BILIRUBIN DIRECT: CPT

## 2020-05-08 PROCEDURE — 36415 COLL VENOUS BLD VENIPUNCTURE: CPT

## 2020-05-08 PROCEDURE — 84450 TRANSFERASE (AST) (SGOT): CPT

## 2020-05-08 PROCEDURE — 85027 COMPLETE CBC AUTOMATED: CPT

## 2020-05-08 PROCEDURE — 84075 ASSAY ALKALINE PHOSPHATASE: CPT

## 2020-05-08 PROCEDURE — 84155 ASSAY OF PROTEIN SERUM: CPT

## 2020-05-08 PROCEDURE — 84460 ALANINE AMINO (ALT) (SGPT): CPT

## 2020-05-08 PROCEDURE — 84156 ASSAY OF PROTEIN URINE: CPT

## 2020-05-08 PROCEDURE — 80069 RENAL FUNCTION PANEL: CPT

## 2020-05-08 PROCEDURE — 80061 LIPID PANEL: CPT

## 2020-05-08 PROCEDURE — 80076 HEPATIC FUNCTION PANEL: CPT

## 2020-05-08 PROCEDURE — 82247 BILIRUBIN TOTAL: CPT

## 2020-05-08 PROCEDURE — 80197 ASSAY OF TACROLIMUS: CPT | Performed by: INTERNAL MEDICINE

## 2020-05-08 NOTE — PROGRESS NOTES
" PATIENT CAN'T DOWNLOAD METER AT HOME-SANDEE    Camacho Bhagat is a 54 year old male who is being evaluated via a billable telephone visit.      The patient has been notified of following:     \"This telephone visit will be conducted via a call between you and your physician/provider. We have found that certain health care needs can be provided without the need for a physical exam.  This service lets us provide the care you need with a short phone conversation.  If a prescription is necessary we can send it directly to your pharmacy.  If lab work is needed we can place an order for that and you can then stop by our lab to have the test done at a later time.    Telephone visits are billed at different rates depending on your insurance coverage. During this emergency period, for some insurers they may be billed the same as an in-person visit.  Please reach out to your insurance provider with any questions.    If during the course of the call the physician/provider feels a telephone visit is not appropriate, you will not be charged for this service.\"    Patient has given verbal consent for Telephone visit?  Yes    What phone number would you like to be contacted at? [phone]    How would you like to obtain your AVS? Mail a copy    [facility]  Date: 05/11/2020    SUBJECTIVE:  Camacho Bhagat is a 54 year old male with PMHx of CASTAÑEDA/HCC s/p liver transplant (3/2017), fibromyalgia, DVT, HTN, RONNIE, OA, and CVA who presents for follow-up of his DM-II     Type 2 DM was diagnosed in 2002.  Oral meds first, later placed on insulin.     Related history: h/o CASTAÑEDA cirrhosis +/- work exposures, and HCC s/p liver transplant 3/2017. Course complicated by acute abdomen/neutropenic fever requiring right hemicolectomy and colostomy Fall 2017. He later underwent colostomy takedown 1/5/18 which was complicated by abdominal wall abscess at a drain site. During this course, he has also developed excessive " proteinuria which is followed by nephrology.     The patient cannot download his glucometer at home.  He reports checking his blood sugar twice daily, in the morning and before dinner, sometimes at bedtime.  He mainly checks his bedtime blood sugar if he has a high carbohydrate intake with dinner (this rarely happens).  His morning blood sugar is around 137-170.  Predinner blood sugar is variable between 75 and 125, most of the days.  In general, the morning blood sugar tends to be the highest blood sugar of the day.  At bedtime, if the blood sugar is elevated, he always uses the correction.  He reports a total daily dose of NovoLog variable between 15 and 30 units.    He denies experiencing hypoglycemia.  He develops symptoms of hypoglycemia if his blood glucose is in the 80s.    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    The patient has noticed some tremor of the hands for the past year.  He questions whether or not gabapentin can be replaced by other medications.  It was prescribed to control the neuropathic pain in the left foot, which started approximately 8 to 10 years ago.  Recently, he has noticed some mild pain in the right foot.    Current DM Regimen:  55 units of Lantus in the morning.  1 unit NovoLog per 3 g carbohydrates for all meals and snacks.  Correction dose of 1 unit per 25 mg above 150 during the day and 200 at bedtime.    Related meds:  Prednisone started for transplant and the dose remains 2.5 mg per day.  He continues to take tacrolimus and myfortic acid. He stopped his losartan in conjunction w/ his renal NP due to hyperkalemia. BP at home have been well controlled.  Today, his blood pressure was 135/74, with a pulse of 55.     Complications  + chronic complications.      1. Retinopathy: No known eye disease  Last eye exam was 11/12/19.  H/o DR. Fernández  has cataracts.       2.  Nephropathy: yes. CKD IIIa . He has significant proteinuria and f/up with nephrology.  Recent GFR in the 50s. He was started on losartan on 4/4/18 and the dose was increased to 50 mg daily. Discontinued losartan around 3/2019 due to hyperkalemia.     3. Neuropathy  As above. No ulcers or infection. No amputation.     5. Macrovascular: none      6. Lipids: not on statin . last LDL in 30s    Review Of Systems  longstanging loose BMs 1-2 times a month (he attributes them to prior colectomy)  Tremor of the hands for 1 year   Left foot severe pain   12 point ROS negative unless noted above    Past Medical History  Past Medical History:   Diagnosis Date     Depressive disorder, not elsewhere classified      Diabetes type 2, controlled (H) 11/10/2016     Esophageal reflux      Fibromyalgia 1/2009    dx with Dr Benitez( Rheum)     Gangrene of finger (H) 8/25/2017     H/O deep venous thrombosis 11/2001    Permanent IVC filter in place.     H/O CASTAÑEDA (nonalcoholic steatohepatitis)      H/O Pneumonia, organism unspecified(486) 10/2001    Included ARDS, sepsis, and  acute renal failure; hospitalized, required tracheostomy placement.     H/O: HTN (hypertension) 11/2001    No longer prescribed antihypertensive medication.       History of CVA (cerebrovascular accident)      History of hepatocellular carcinoma      History of liver transplant (H)      History of obstructive sleep apnea     No longer wears CPAP since losing approximately 200 pounds with his liver transplant and its complications.       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     Osteoarthritis      Presence of PERMANENT IVC filter      Rheumatoid arthritis(714.0)      Past Surgical History  Past Surgical History:   Procedure Laterality Date     BENCH LIVER N/A 3/4/2017     Procedure: BENCH LIVER;  Surgeon: Jovan Tran MD;  Location: [facility] OR     C TOTAL KNEE ARTHROPLASTY  2008    Right knee arthroscopy     CHOLECYSTECTOMY       COLONOSCOPY N/A 7/21/2017    Procedure: COMBINED COLONOSCOPY, SINGLE OR MULTIPLE BIOPSY/POLYPECTOMY BY BIOPSY;  Colonoscopy;  Surgeon: Izaiah Montes MD;  Location: [facility] GI     ENDOSCOPIC RETROGRADE CHOLANGIOPANCREATOGRAM N/A 5/22/2018    Procedure: COMBINED ENDOSCOPIC RETROGRADE CHOLANGIOPANCREATOGRAPHY, PLACE TUBE/STENT;  Endoscopic Retrograde Cholangiopancreatography with sphincterotomy and pancreatic duct stent placement;  Surgeon: Zay Gaitan MD;  Location: [facility] OR     ENT SURGERY      Repair of deviated septum     ESOPHAGOSCOPY, GASTROSCOPY, DUODENOSCOPY (EGD), COMBINED N/A 8/4/2016    Procedure: COMBINED ESOPHAGOSCOPY, GASTROSCOPY, DUODENOSCOPY (EGD), BIOPSY SINGLE OR MULTIPLE;  Surgeon: Trent Pederson MD;  Location:  GI     ESOPHAGOSCOPY, GASTROSCOPY, DUODENOSCOPY (EGD), COMBINED N/A 8/27/2017    Procedure: COMBINED ESOPHAGOSCOPY, GASTROSCOPY, DUODENOSCOPY (EGD);;  Surgeon: Los Wynn MD;  Location: [facility]     INCISION AND DRAINAGE ABDOMEN WASHOUT, COMBINED Right 2/14/2018    Procedure: COMBINED INCISION AND DRAINAGE ABDOMEN WASHOUT;  COMBINED INCISION AND DRAINAGE right ABDOMEN flank;  Surgeon: Sara Dinh MD;  Location: [facility] OR     LAPAROTOMY EXPLORATORY N/A 7/4/2017    Procedure: LAPAROTOMY EXPLORATORY;  Exploratory Laparotomy, washout;  Surgeon: Tip Zhang MD;  Location: [facility]     LAPAROTOMY EXPLORATORY N/A 7/5/2017    Procedure: LAPAROTOMY EXPLORATORY;  Exploratory Laparotomy, Washout with closure.;  Surgeon: Tip Zhang MD;  Location: [facility]     LAPAROTOMY EXPLORATORY N/A 7/26/2017    Procedure: LAPAROTOMY EXPLORATORY;  Exploratory Laparotomy, Right angelique-colectomy, end ileostomy, mucosal fistula, partial omentectomy;  Surgeon: Sara Dinh MD;  Location: [facility]      ORTHOPEDIC SURGERY Right     Repair of dislocated wrist.       RELEASE TRIGGER FINGER Right 11/10/2017    Procedure: RELEASE TRIGGER FINGER;  Debride, V-Y Flap Right Index Finger;  Surgeon: Momo Nooann MD;  [facility]: [facility]     TAKEDOWN ILEOSTOMY N/A 2018    Procedure: TAKEDOWN ILEOSTOMY;  Exploratory Laparotomy, Lysis of Adhesions, Takedown Of End Ileostomy, Takedown of mucocutaneous fistula, ileocolic resection  And Colorectal Anastomosis, Primary repair of Ventral Hernia, Anesthesia Block ;  Surgeon: Sara Dinh MD;  Location: [facility] OR     TRACHEOSTOMY  2001    During hospitalization for pneumonia.       TRANSPLANT LIVER RECIPIENT  DONOR N/A 3/4/2017    Procedure: TRANSPLANT LIVER RECIPIENT  DONOR;  Surgeon: Jovan Tran MD;  Location: [facility] OR        Medications  Current Outpatient Medications   Medication Sig Dispense Refill     amLODIPine (NORVASC) 10 MG tablet Take 1 tablet (10 mg) by mouth daily 90 tablet 3     cholecalciferol (VITAMIN D3) 1000 UNIT tablet Take 1 tablet (1,000 Units) by mouth daily (Patient taking differently: Take 1,000 Units by mouth every morning ) 100 tablet 3     CIALIS 5 MG tablet Take 5 mg by mouth as needed   1     cyclobenzaprine (FLEXERIL) 10 MG tablet Take 1 tablet (10 mg) by mouth 3 times daily as needed for muscle spasms 90 tablet 3     fluticasone (FLONASE) 50 MCG/ACT nasal spray Spray 1 spray into both nostrils daily 15 mL 1     furosemide (LASIX) 40 MG tablet Take 1 tablet (40 mg) by mouth 2 times daily 180 tablet 3     gabapentin (NEURONTIN) 300 MG capsule Take 1 capsule (300 mg) by mouth 3 times daily 270 capsule 3     Glucose Blood (BLOOD GLUCOSE TEST STRIPS) STRP 1 strip by Lancet route 3 times daily 300 each 3     insulin glargine (LANTUS SOLOSTAR) 100 UNIT/ML pen Inject 55 Units Subcutaneous every morning 60 mL 3     Insulin Lispro (HUMALOG KWIKPEN) 200 UNIT/ML soln Use one unit per 3 grams carbohydrates for all meals and  snacks. Total daily dose up to 80 U. 72 mL 3     insulin pen needle (BD ENE U/F) 32G X 4 MM miscellaneous Use 1 pen needle 4 times daily or as directed. 400 each 3     metoprolol succinate ER (TOPROL-XL) 200 MG 24 hr tablet Take 1 tablet (200 mg) by mouth daily Take a total of 150 mg daily 90 tablet 3     multivitamin, therapeutic with minerals (MULTI-VITAMIN) TABS tablet Take 1 tablet by mouth every morning       mycophenolic acid (GENERIC EQUIVALENT) 360 MG EC tablet Take 2 tablets (720 mg) by mouth every 12 hours 360 tablet 3     omeprazole (PRILOSEC) 20 MG DR capsule Take 1 capsule (20 mg) by mouth daily 90 capsule 3     oxyCODONE (ROXICODONE) 5 MG tablet Take 1 tablet (5 mg) by mouth every 4 hours as needed for pain maximum 6 tablet(s) per day 30 tablet 0     patiromer (VELTASSA) 8.4 g packet Take 8.4 g by mouth daily 180 each 3     predniSONE (DELTASONE) 2.5 MG tablet Take 1 tablet (2.5 mg) by mouth daily 90 tablet 3     tacrolimus (GENERIC EQUIVALENT) 1 MG capsule Take 5 capsules (5 mg) by mouth every 12 hours 300 capsule 11     ursodiol (ACTIGALL) 500 MG tablet Take 1 tablet (500 mg) by mouth 2 times daily 180 tablet 3       OBJECTIVE:  Objective:  Psych: Alert and oriented times 3; coherent speech, normal   rate and volume, able to articulate logical thoughts, able   to abstract reason, no tangential thoughts, no hallucinations   or delusions. His affect is normal.    Lab Results   Component Value Date    A1C 5.5 04/11/2018    A1C 5.1 01/06/2018    A1C  07/06/2017     Canceled, Test credited   Below Assay Range  NOTIFIED LEONARD ONEILL AT 0538 ON 7/6/17 BY CHRISSY      A1C 9.4 (H) 02/16/2017    A1C 6.2 (H) 12/14/2016    HEMOGLOBINA1 5.8 11/18/2019    HEMOGLOBINA1 6.1 (A) 11/19/2018       Hemoglobin   Date Value Ref Range Status   05/08/2020 12.0 (L) 13.3 - 17.7 g/dL Final     Hematocrit   Date Value Ref Range Status   05/08/2020 36.6 (L) 40.0 - 53.0 % Final     Cholesterol   Date Value Ref Range Status    05/08/2020 141 <200 mg/dL Final     Cholesterol/HDL Ratio   Date Value Ref Range Status   07/03/2012 3.8 0.0 - 5.0 Final     HDL Cholesterol   Date Value Ref Range Status   05/08/2020 33 (L) >39 mg/dL Final     LDL Cholesterol Calculated   Date Value Ref Range Status   05/08/2020 35 <100 mg/dL Final     Comment:     Desirable:       <100 mg/dl     VLDL-Cholesterol   Date Value Ref Range Status   07/03/2012 36 (H) 0 - 30 mg/dL Final     Triglycerides   Date Value Ref Range Status   05/08/2020 365 (H) <150 mg/dL Final     Comment:     Borderline high:  150-199 mg/dl  High:             200-499 mg/dl  Very high:       >499 mg/dl       Albumin Urine mg/L   Date Value Ref Range Status   10/27/2017 122 mg/L Final     TSH   Date Value Ref Range Status   04/02/2018 2.74 0.40 - 4.00 mU/L Final     Last Basic Metabolic Panel:    Sodium   Date Value Ref Range Status   05/08/2020 137 133 - 144 mmol/L Final     Potassium   Date Value Ref Range Status   05/08/2020 4.8 3.4 - 5.3 mmol/L Final     Chloride   Date Value Ref Range Status   05/08/2020 110 (H) 94 - 109 mmol/L Final     Calcium   Date Value Ref Range Status   05/08/2020 8.9 8.5 - 10.1 mg/dL Final     Carbon Dioxide   Date Value Ref Range Status   05/08/2020 22 20 - 32 mmol/L Final     Urea Nitrogen   Date Value Ref Range Status   05/08/2020 45 (H) 7 - 30 mg/dL Final     Creatinine   Date Value Ref Range Status   05/08/2020 1.42 (H) 0.66 - 1.25 mg/dL Final     GFR Estimate   Date Value Ref Range Status   05/08/2020 55 (L) >60 mL/min/[1.73_m2] Final     Comment:     Non  GFR Calc  Starting 12/18/2018, serum creatinine based estimated GFR (eGFR) will be   calculated using the Chronic Kidney Disease Epidemiology Collaboration   (CKD-EPI) equation.       Glucose   Date Value Ref Range Status   05/08/2020 94 70 - 99 mg/dL Final       AST   Date Value Ref Range Status   05/08/2020 25 0 - 45 U/L Final     ALT   Date Value Ref Range Status   05/08/2020 40 0 - 70  U/L Final     Albumin Urine mg/g Cr   Date Value Ref Range Status   10/27/2017 132.46 (H) 0 - 17 mg/g Cr Final     ASSESSMENT/PLAN:  Camacho Bhagat is a 54 year old man w/ PMx of CASTAÑEDA/HCC s/p liver transplant (3/2017), fibromyalgia, DVT, HTN, RONNIE, OA, and CVA who presents for follow-up of his DM-II     1. Type 2 Diabetes  A1c less reliable in the context of mild stable anemia. It was 5.8% in December 2019.   The patient reports an overall good blood glucose control. The carbohydrate intake and the insulin dosages were not available for my review.  The patient declines a CGM, despite my recommendation.  I counseled him that dislodging is unlikely, as we currently have multiple options of taping the device securely.  He is going to keep written records of the blood glucose, carbohydrate intake and insulin dose administered and send them to us.    2.  Hypercholesterolemia, well controlled     3.  Left foot neuropathic pain    Not clear whether or not it is indeed related to diabetes.  The patient is interested in additional pain control.  Lyrica was tried in the past with no benefit.  He intermittently takes oxycodone for joint pain.  He is interested in getting a second opinion from neurology and will have him scheduled.

## 2020-05-11 ENCOUNTER — VIRTUAL VISIT (OUTPATIENT)
Dept: ENDOCRINOLOGY | Facility: CLINIC | Age: 55
End: 2020-05-11
Payer: COMMERCIAL

## 2020-05-11 DIAGNOSIS — G62.9 PERIPHERAL POLYNEUROPATHY: ICD-10-CM

## 2020-05-11 DIAGNOSIS — Z79.4 TYPE 2 DIABETES MELLITUS WITH DIABETIC NEPHROPATHY, WITH LONG-TERM CURRENT USE OF INSULIN (H): Primary | ICD-10-CM

## 2020-05-11 DIAGNOSIS — E78.00 HYPERCHOLESTEROLEMIA: ICD-10-CM

## 2020-05-11 DIAGNOSIS — E11.21 TYPE 2 DIABETES MELLITUS WITH DIABETIC NEPHROPATHY, WITH LONG-TERM CURRENT USE OF INSULIN (H): Primary | ICD-10-CM

## 2020-05-11 RX ORDER — INSULIN LISPRO 200 [IU]/ML
INJECTION, SOLUTION SUBCUTANEOUS
Qty: 60 ML | Refills: 3 | Status: SHIPPED | OUTPATIENT
Start: 2020-05-11 | End: 2020-12-23

## 2020-05-11 RX ORDER — PEN NEEDLE, DIABETIC 32GX 5/32"
NEEDLE, DISPOSABLE MISCELLANEOUS
Qty: 400 EACH | Refills: 3 | Status: SHIPPED | OUTPATIENT
Start: 2020-05-11 | End: 2021-07-17

## 2020-05-11 RX ORDER — GLUCOSAMINE HCL/CHONDROITIN SU 500-400 MG
1 CAPSULE ORAL 3 TIMES DAILY
Qty: 300 EACH | Refills: 3 | Status: SHIPPED | OUTPATIENT
Start: 2020-05-11 | End: 2023-05-09

## 2020-05-14 ENCOUNTER — PRE VISIT (OUTPATIENT)
Dept: NEUROLOGY | Facility: CLINIC | Age: 55
End: 2020-05-14

## 2020-05-14 NOTE — TELEPHONE ENCOUNTER
FUTURE VISIT INFORMATION      FUTURE VISIT INFORMATION:    Date: 7/15/2020    Time: 230pm    Location: [facility] [place]  REFERRAL INFORMATION:    Referring provider:  Dr. West    Referring providers clinic:   [facility]     Reason for visit/diagnosis  Peripheral Polyneuropathy    RECORDS REQUESTED FROM:       Clinic name Comments Records Status Imaging Status   Internal Dr. West-5/11/2020, 11/18/2019 Epic N/A

## 2020-05-18 DIAGNOSIS — Z94.4 LIVER TRANSPLANT RECIPIENT (H): ICD-10-CM

## 2020-05-18 RX ORDER — TACROLIMUS 1 MG/1
5 CAPSULE ORAL EVERY 12 HOURS
Qty: 900 CAPSULE | Refills: 3 | Status: SHIPPED | OUTPATIENT
Start: 2020-05-18 | End: 2020-05-27

## 2020-05-18 NOTE — TELEPHONE ENCOUNTER
Left a message for pt stating that a script was sent to  pharmacy and to follow with the phgarmacy. Number given.

## 2020-05-18 NOTE — TELEPHONE ENCOUNTER
Patient Call: Medication Refill  Route to LPN  Instruct the patient to first contact their pharmacy. If they have called their pharmacy and require further assistance, route to LPN.    Pharmacy Name: [facility]  Pharmacy Location: [facility]- is unable to find any Tacro 1 mg or 5 mg tablets    Name of Medication: pt wonders if [facility] get him a months supply  Will be out of med on Thurs  Dose: 1 mg   When will the patient be out of this medication?: Less than 3 days (Route high priority)

## 2020-05-19 ENCOUNTER — VIRTUAL VISIT (OUTPATIENT)
Dept: NEPHROLOGY | Facility: CLINIC | Age: 55
End: 2020-05-19
Payer: COMMERCIAL

## 2020-05-19 VITALS
BODY MASS INDEX: 31.6 KG/M2 | OXYGEN SATURATION: 98 % | DIASTOLIC BLOOD PRESSURE: 76 MMHG | TEMPERATURE: 98.6 F | HEART RATE: 61 BPM | WEIGHT: 233 LBS | SYSTOLIC BLOOD PRESSURE: 137 MMHG

## 2020-05-19 DIAGNOSIS — N18.30 CKD (CHRONIC KIDNEY DISEASE) STAGE 3, GFR 30-59 ML/MIN (H): Primary | ICD-10-CM

## 2020-05-19 DIAGNOSIS — D63.1 ANEMIA OF CHRONIC RENAL FAILURE, STAGE 3 (MODERATE) (H): ICD-10-CM

## 2020-05-19 DIAGNOSIS — E55.9 VITAMIN D DEFICIENCY: ICD-10-CM

## 2020-05-19 DIAGNOSIS — N18.30 ANEMIA OF CHRONIC RENAL FAILURE, STAGE 3 (MODERATE) (H): ICD-10-CM

## 2020-05-19 ASSESSMENT — PAIN SCALES - GENERAL: PAINLEVEL: NO PAIN (0)

## 2020-05-19 NOTE — PROGRESS NOTES
"1st attempt to reach patient at 12:52, no answer, msg left    Camacho Bhagat is a 54 year old male who is being evaluated via a billable telephone visit.      The patient has been notified of following:     \"This telephone visit will be conducted via a call between you and your physician/provider. We have found that certain health care needs can be provided without the need for a physical exam.  This service lets us provide the care you need with a short phone conversation.  If a prescription is necessary we can send it directly to your pharmacy.  If lab work is needed we can place an order for that and you can then stop by our lab to have the test done at a later time.    Telephone visits are billed at different rates depending on your insurance coverage. During this emergency period, for some insurers they may be billed the same as an in-person visit.  Please reach out to your insurance provider with any questions.    If during the course of the call the physician/provider feels a telephone visit is not appropriate, you will not be charged for this service.\"    Patient has given verbal consent for Telephone visit?  Yes    What phone number would you like to be contacted at? [phone]    How would you like to obtain your AVS? Kwaku    Phone call duration: 15 minutes    Cinda Foster CNP    "

## 2020-05-19 NOTE — PROGRESS NOTES
Nephrology Telephone Visit 5/19/20    Assessment and Plan:       1. CKD2 w/mild proteinuria - Relatively stable. Creat 1.4, eGFR 55 ml/mn. UPCR 0.4 g/gCr.    Baseline remains in the low to mid 1 range.    Etiology of CKD likely multifactorial; DM, recurrent EJ, CNI.    - Had been intolerant of ACE/ARB previously given problems with hyperkalemia assoc with Tacrolimus, but retrial when TAC level was <0.3 did not result in Hyperkalemia. In fact Valtessa had been discontinued because he was becoming hypokalemic.    - Patient was restarted on ARB in 4/18 for unexplained nephrotic range proteinuria following ostomy takedown with improvement in UPCR, but then developed mild acute rejection and Tac dose was increased resulting in hyperkalemia. He was restarted on Valtassa with improvement in hyperkalemia and Losartan was discontinued again in 3/19   - Potassium has been controlled since 4/19 off Losartan. UPCR with only mild proteinuria.    - Blood pressure goal < 140/80.    - Does not use NSAIDs.    - A1c goal is 7%. HgbA1c 5.8% (11/19)      2. HTN - Improved. Home blood pressure ave 133/72. No edema/dyspnea. Weight is declining with weight loss efforts.     Current antihypertensive regimen:      Lasix 40 mg bid     Amlodipine 10 mg qd      Toprol  mg every day     - Anticipate further improvement with weight loss.    - Continue to monitor blood pressures       3. Hyperkalemia assoc with Tac/ARB - K 4.8 and has been acceptable since stopping Losartan in March 2019.    - Currently on Valtassa 8.4 g/day.    - He tries to modify his diet w/respect to K foods      4. Volume status - Denies edema/dyspnea. Weight 232 #, down from 238 #, 243#. Was 220# in 12/18. Blood pressures improved.    - Continue Lasix 40 mg bid.       5. Acid base - No acute concerns. Metabolic acidosis resolved following ileostomy takedown. Bicarb 22      6. BMD - Ca 8.9, Phos 2.8, albumin 3.9   - Vitamin D 43, PTH 22 (8/19)    - Continue Vit D 1000  U qd      7.Anemia -Hgb 12.0.    - No recent iron studies. Will check next visit   - Had colonoscopy 2017 (poor study), Ileoscopy 8/17 - nml   - Not on iron and has not needed DIPAK      8. Liver transplant IS: Tacrolimus, MMF, Pred. Tac level 8.5      9. Dispo- RCT 6 months for f/u.       Reason for Visit:  CKD2/3/HTN follow up        HPI:  Mr Bhagat is a 55 yo male with CKD2, HTN, Chronic hyperkalemia, Liver transplant, present today for CKD/HTN followup. Last seen in clinic by me on 2/19/20. Patient has been working on weight loss to assist hypertension management.   Baseline creat low 1's.       Interval Hx:     No hospital admissions.       ROS:  No acute concerns.   Trying to get out and get more exercise. Slowly working on weight loss  Activity limited somewhat by arthritis pain in hip/neck. Not taking NSAIDs  Home blood pressure ave for the last 30 days: 133/72, HR 59  Appetite and energy are good  No edema.   Denies dyspnea,  CP, abdominal pain. Voiding w/o difficulty.          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 Former smoker,   by profession. Exercising regularly.  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     Cervical Cancer Maternal Grandmother      Cerebrovascular Disease Maternal Grandfather      No Known Problems Paternal Grandmother      Prostate Cancer Paternal Grandfather      Colon Cancer No family hx of       Hyperlipidemia No family hx of      Coronary Artery Disease No family hx of      Breast Cancer No family hx of        Allergies:  Allergies   Allergen Reactions     Erythromycin GI Disturbance     Vioxx      Nausea, vomiting       Medications:  Current Outpatient Medications   Medication Sig     alcohol swab prep pads Use to swab area of injection/francisca as directed.     amLODIPine (NORVASC) 10 MG tablet Take 1 tablet (10 mg) by mouth daily     blood glucose (NO BRAND SPECIFIED) test strip Use to test blood sugar 3 times daily or as directed. Any covered brand preferred by Pt that works with meter.     blood glucose calibration (NO BRAND SPECIFIED) solution Use to calibrate meter.     blood glucose monitoring (NO BRAND SPECIFIED) meter device kit Use to test blood sugar 3 times daily or as directed. Any covered brand preferred by Pt.     CIALIS 5 MG tablet Take 5 mg by mouth as needed      cyclobenzaprine (FLEXERIL) 10 MG tablet Take 1 tablet (10 mg) by mouth 3 times daily as needed for muscle spasms     fluticasone (FLONASE) 50 MCG/ACT nasal spray Spray 1 spray into both nostrils daily     furosemide (LASIX) 40 MG tablet Take 1 tablet (40 mg) by mouth 2 times daily     gabapentin (NEURONTIN) 300 MG capsule Take 1 capsule (300 mg) by mouth 3 times daily     Glucose Blood (BLOOD GLUCOSE TEST STRIPS) STRP 1 strip by Lancet route 3 times daily     insulin glargine (LANTUS SOLOSTAR) 100 UNIT/ML pen Inject 55 Units Subcutaneous every morning     Insulin Lispro (HUMALOG KWIKPEN) 200 UNIT/ML soln Use one unit per 3 grams carbohydrates for all meals and snacks. Total daily dose up to 60 U.     insulin pen needle (BD ENE U/F) 32G X 4 MM miscellaneous Use 1 pen needle 4 times daily or as directed.     metoprolol succinate ER (TOPROL-XL) 200 MG 24 hr tablet Take 1 tablet (200 mg) by mouth daily Take a total of 150 mg daily     multivitamin, therapeutic with minerals (MULTI-VITAMIN) TABS tablet Take 1 tablet by mouth every  morning     mycophenolic acid (GENERIC EQUIVALENT) 360 MG EC tablet Take 2 tablets (720 mg) by mouth every 12 hours     oxyCODONE (ROXICODONE) 5 MG tablet Take 1 tablet (5 mg) by mouth every 4 hours as needed for pain maximum 6 tablet(s) per day     patiromer (VELTASSA) 8.4 g packet Take 8.4 g by mouth daily     predniSONE (DELTASONE) 2.5 MG tablet Take 1 tablet (2.5 mg) by mouth daily     tacrolimus (GENERIC EQUIVALENT) 1 MG capsule Take 5 capsules (5 mg) by mouth every 12 hours     thin (NO BRAND SPECIFIED) lancets Use with lanceting device. Any covered brand.     ursodiol (ACTIGALL) 500 MG tablet Take 1 tablet (500 mg) by mouth 2 times daily     cholecalciferol (VITAMIN D3) 1000 UNIT tablet Take 1 tablet (1,000 Units) by mouth daily (Patient taking differently: Take 1,000 Units by mouth every morning )     omeprazole (PRILOSEC) 20 MG DR capsule Take 1 capsule (20 mg) by mouth daily     No current facility-administered medications for this visit.       Vitals:  /76   Pulse 61   Temp 98.6  F (37  C)   Wt 105.7 kg (233 lb)   SpO2 98%   BMI 31.60 kg/m      Exam:    No exam performed    LABS:   CMP  Recent Labs   Lab Test 05/08/20  1247 04/02/20  1232 01/31/20  1230 11/26/19  1222    135 135 142   POTASSIUM 4.8 4.5 4.9 5.2   CHLORIDE 110* 109 105 114*   CO2 22 21 24 23   ANIONGAP 5 5 6 5   GLC 94 155* 200* 103*   BUN 45* 56* 48* 50*   CR 1.42* 1.49* 1.54* 1.41*   GFRESTIMATED 55* 52* 50* 56*   GFRESTBLACK 64 60* 58* 64   JACOB 8.9 8.7 9.0 9.3     Recent Labs   Lab Test 05/08/20  1247 04/02/20  1232 01/31/20  1230 11/26/19  1222   BILITOTAL 0.8 0.9 0.9 0.8   ALKPHOS 283* 271* 246* 216*   ALT 40 44 42 37   AST 25 29 19 21     CBC  Recent Labs   Lab Test 05/08/20  1247 04/02/20  1232 01/31/20  1230 11/26/19  1222   HGB 12.0* 12.4* 12.2* 12.6*   WBC 5.7 6.4 7.0 6.2   RBC 3.92* 4.03* 3.91* 3.95*   HCT 36.6* 37.3* 36.3* 37.5*   MCV 93 93 93 95   MCH 30.6 30.8 31.2 31.9   MCHC 32.8 33.2 33.6 33.6   RDW 13.5  12.9 13.6 13.4   * 101* 114* 95*     URINE STUDIES  Recent Labs   Lab Test 04/02/20  1230 03/27/19  1155 04/11/18  1446 02/26/18  1115   COLOR Light Yellow Yellow Yellow Yellow   APPEARANCE Clear Clear Slightly Cloudy Clear   URINEGLC Negative 70* >499* >499*   URINEBILI Negative Negative Negative Negative   URINEKETONE Negative Negative Negative Negative   SG 1.014 1.014 1.010 1.010   UBLD Negative Negative Small* Negative   URINEPH 5.0 5.0 5.0 5.0   PROTEIN 10* 30* 100* 100*   NITRITE Negative Negative Negative Negative   LEUKEST Negative Negative Negative Negative   RBCU <1 1 3* 2   WBCU <1 0 <1 1     Recent Labs   Lab Test 05/08/20  1225 04/02/20  1230 01/31/20  1220 08/06/19  1230   UTPG 0.41* 0.34* 0.28* 0.96*     PTH  Recent Labs   Lab Test 08/06/19  1234 10/10/18  1059 09/18/18  1212   PTHI 22 22 28     IRON STUDIES  Recent Labs   Lab Test 07/10/18  1022 03/06/18  1029 02/05/18  1108   IRON 166 143 44   * 228* 166*   IRONSAT 73* 63* 26   NELY 1,338* 1,362* 1,014*       Cinda Cazares, NP

## 2020-05-27 ENCOUNTER — TELEPHONE (OUTPATIENT)
Dept: TRANSPLANT | Facility: CLINIC | Age: 55
End: 2020-05-27

## 2020-05-27 DIAGNOSIS — Z94.4 LIVER TRANSPLANT RECIPIENT (H): ICD-10-CM

## 2020-05-27 RX ORDER — TACROLIMUS 1 MG/1
5 CAPSULE ORAL EVERY 12 HOURS
Qty: 900 CAPSULE | Refills: 3 | Status: SHIPPED | OUTPATIENT
Start: 2020-05-27 | End: 2021-06-07

## 2020-05-27 NOTE — TELEPHONE ENCOUNTER
Camacho' local [facility] pharmacy is not able to obtain Tacrolimus at this time. They only use [employer] and there is limited supply at this time.     [facility] spoke with [facility] where he can obtain a 90 day supply and is requesting a RX be sent to them ASAP.     Tacrolimus (1 mg capsule) 5 mg every 12 hours. 90 day supply with 3 refills to be sent to [facility] at [phone] Option 1.

## 2020-05-29 ENCOUNTER — TELEPHONE (OUTPATIENT)
Dept: TRANSPLANT | Facility: CLINIC | Age: 55
End: 2020-05-29

## 2020-05-29 NOTE — TELEPHONE ENCOUNTER
Left a voicemail for patient to convert their in person visit with Dr Camejo on  6/3/20 to a video visit.

## 2020-05-29 NOTE — TELEPHONE ENCOUNTER
In light of the COVID 19 pandemic, [facility] cares about your safety and we are calling to convert your scheduled in-person clinic visit to a phone visit on 6/3/20 date.  The appointment will be on the same date and time.  What is the best number for the provider to call you at?  [phone]--No Video Equipment

## 2020-06-01 ENCOUNTER — VIRTUAL VISIT (OUTPATIENT)
Dept: INTERNAL MEDICINE | Facility: CLINIC | Age: 55
End: 2020-06-01
Payer: COMMERCIAL

## 2020-06-01 DIAGNOSIS — E11.42 TYPE 2 DIABETES MELLITUS WITH DIABETIC POLYNEUROPATHY, WITH LONG-TERM CURRENT USE OF INSULIN (H): ICD-10-CM

## 2020-06-01 DIAGNOSIS — M25.552 BILATERAL HIP PAIN: Primary | ICD-10-CM

## 2020-06-01 DIAGNOSIS — Z79.4 TYPE 2 DIABETES MELLITUS WITH DIABETIC POLYNEUROPATHY, WITH LONG-TERM CURRENT USE OF INSULIN (H): ICD-10-CM

## 2020-06-01 DIAGNOSIS — M25.551 BILATERAL HIP PAIN: Primary | ICD-10-CM

## 2020-06-01 DIAGNOSIS — M79.672 BILATERAL FOOT PAIN: ICD-10-CM

## 2020-06-01 DIAGNOSIS — I15.1 HYPERTENSION SECONDARY TO OTHER RENAL DISORDERS: ICD-10-CM

## 2020-06-01 DIAGNOSIS — M79.671 BILATERAL FOOT PAIN: ICD-10-CM

## 2020-06-01 NOTE — NURSING NOTE
Chief Complaint   Patient presents with     Recheck Medication     Pt would like to discuss medications      Referral     Pt would like to discuss referral for podiatrist      Gill Elliott EMT at 10:31 AM sign on 6/1/2020

## 2020-06-01 NOTE — PROGRESS NOTES
"Camacho Bhagat is a 54 year old male who is being evaluated via a billable telephone visit.      The patient has been notified of following:     \"This telephone visit will be conducted via a call between you and your physician/provider. We have found that certain health care needs can be provided without the need for a physical exam.  This service lets us provide the care you need with a short phone conversation.  If a prescription is necessary we can send it directly to your pharmacy.  If lab work is needed we can place an order for that and you can then stop by our lab to have the test done at a later time.    Telephone visits are billed at different rates depending on your insurance coverage. During this emergency period, for some insurers they may be billed the same as an in-person visit.  Please reach out to your insurance provider with any questions.    If during the course of the call the physician/provider feels a telephone visit is not appropriate, you will not be charged for this service.\"    Patient has given verbal consent for Telephone visit?  Yes    What phone number would you like to be contacted at? [phone]    How would you like to obtain your AVS? MyChart    The patient has been notified of following:     \"This telephone visit will be conducted via a call between you and your physician/provider. We have found that certain health care needs can be provided without the need for a physical exam.  This service lets us provide the care you need with a short phone conversation.  If a prescription is necessary we can send it directly to your pharmacy.  If lab work is needed we can place an order for that and you can then stop by our lab to have the test done at a later time.    If during the course of the call the physician/provider feels a telephone visit is not appropriate, you will not be charged for this service.\"     Chief Complaint   Patient presents with     Recheck Medication     Pt would like " "to discuss medications      Referral     Pt would like to discuss referral for podiatrist        I have reviewed and updated the patient's Past Medical History, Social History, Family History and Medication List.  Allergies were reviewed with patient.    Start Time: 10:34am  Stop Time: 10:49am  Duration: 15 min  Additional time:     HPI:    He had problems with feet and hips.  DEXA scan showed it was good with minimal loss in bone.  The hips started first and then the feet 2-3 months later.    Feet - he seems to be walking on the outside of his feet which started after having to lie flat for a number of months after the liver transplant and colon surgery (Feb 2019).  He did not notice it before - he used to wear his soles evenly and now it is the outside of both feet.    Hip - as he is sitting he will have hip pain.  If he drives for 45 min he will have to adjust himself.  He will get a sharp sudden hip pain on the lateral part of the hip.  Walking will hurt too - he can go up to about 100 yards before he has to stop.  This is very different pain.  Both hips are the same.  No injury (other than transplant surgery and then recovery and complications - CMV and partial colectomy - July-Nov 2018 he was inpatient or lying flat).    Neuropathy of his left foot - will see neurology in July.    ROS:  Musc - his knees are working \"great\"    Assessment and Plan:    Hip and foot pain - referred for evaluation for foot and hip pain.  I am not sure which came first since a good case could be made that either the hip caused foot pain or the reverse.  I will have him see ortho sports medicine for the hip and podiatry for the foot pain.  He may need inserts for his shoes given he is walking on the lateral aspect of the feet.  I did not order any x-rays given without an exam it is not clear what should be imaged right now.    CKD - stable, off losartan because of hyperkalemia (on Valtassa daily now)    HTN - lasix 40mg BID, " amlodipine 10mg daily, Toprol XL 200mg daily    Liver transplant - on tacolimus, MMF, prednisone    Diabetes - seen by mele on 5/11 and he is being adjusted via their clinic    Neuropathy in feet - referred to neurology for second opinion, not like gabapentin because of tremors and lack of full control, Lyrica was not helpful    Shay Kirkpatrick MD, FACP

## 2020-06-03 ENCOUNTER — TELEPHONE (OUTPATIENT)
Dept: INTERNAL MEDICINE | Facility: CLINIC | Age: 55
End: 2020-06-03

## 2020-06-03 ENCOUNTER — VIRTUAL VISIT (OUTPATIENT)
Dept: GASTROENTEROLOGY | Facility: CLINIC | Age: 55
End: 2020-06-03
Attending: INTERNAL MEDICINE
Payer: COMMERCIAL

## 2020-06-03 DIAGNOSIS — Z94.4 LIVER REPLACED BY TRANSPLANT (H): Primary | ICD-10-CM

## 2020-06-03 DIAGNOSIS — E87.5 HYPERKALEMIA: ICD-10-CM

## 2020-06-03 ASSESSMENT — PAIN SCALES - GENERAL: PAINLEVEL: NO PAIN (0)

## 2020-06-03 NOTE — TELEPHONE ENCOUNTER
Spoke to [facility]. They needed patient starter form as last prescription on file they have was 3 years ago. They needed signed form by MD or CNP or signed prescription to be faxed to them to process through his insurance. Informed patient that this was done. Patient reports that he managed to get 30 day supply from [email or web address] and will call back if more information is needed.  Cele Mancilla LPN  Nephrology  [phone]

## 2020-06-03 NOTE — PROGRESS NOTES
"Camacho Bhagat is a 54 year old male who is being evaluated via a billable telephone visit.      The patient has been notified of following:     \"This telephone visit will be conducted via a call between you and your physician/provider. We have found that certain health care needs can be provided without the need for a physical exam.  This service lets us provide the care you need with a short phone conversation.  If a prescription is necessary we can send it directly to your pharmacy.  If lab work is needed we can place an order for that and you can then stop by our lab to have the test done at a later time.    Telephone visits are billed at different rates depending on your insurance coverage. During this emergency period, for some insurers they may be billed the same as an in-person visit.  Please reach out to your insurance provider with any questions.    If during the course of the call the physician/provider feels a telephone visit is not appropriate, you will not be charged for this service.\"    Patient has given verbal consent for Telephone visit?  Yes    What phone number would you like to be contacted at? [phone]    How would you like to obtain your AVS? Kwaku    I had the pleasure of seeing Camacho Bhagat for followup in the Liver Transplant Clinic at the [facility] on 06/03/2020.  Mr. Bhagat returns for followup now 3 years status post liver transplantation for hepatocellular carcinoma.      He is doing well at this visit.  He denies any abdominal pain, itching or skin rash or fatigue.  He denies any increased abdominal girth or lower extremity edema.  He denies any fevers or chills, cough or shortness of breath.  He denies any nausea or vomiting, diarrhea or constipation.  His appetite has been good.  His weight has increased about while sheltering in place.      His only complaint he really has at this visit is bilateral hip pain.  It is fairly constantly.  It does get " worse when he is standing for longer periods of time.  He did discuss this with Dr. Kirkpatrick and will begin an evaluation by an orthopedic surgeon.      Current Outpatient Medications   Medication     alcohol swab prep pads     amLODIPine (NORVASC) 10 MG tablet     blood glucose (NO BRAND SPECIFIED) test strip     blood glucose calibration (NO BRAND SPECIFIED) solution     blood glucose monitoring (NO BRAND SPECIFIED) meter device kit     cholecalciferol (VITAMIN D3) 1000 UNIT tablet     CIALIS 5 MG tablet     cyclobenzaprine (FLEXERIL) 10 MG tablet     fluticasone (FLONASE) 50 MCG/ACT nasal spray     furosemide (LASIX) 40 MG tablet     gabapentin (NEURONTIN) 300 MG capsule     Glucose Blood (BLOOD GLUCOSE TEST STRIPS) STRP     insulin glargine (LANTUS SOLOSTAR) 100 UNIT/ML pen     Insulin Lispro (HUMALOG KWIKPEN) 200 UNIT/ML soln     insulin pen needle (BD ENE U/F) 32G X 4 MM miscellaneous     metoprolol succinate ER (TOPROL-XL) 200 MG 24 hr tablet     multivitamin, therapeutic with minerals (MULTI-VITAMIN) TABS tablet     mycophenolic acid (GENERIC EQUIVALENT) 360 MG EC tablet     oxyCODONE (ROXICODONE) 5 MG tablet     patiromer (VELTASSA) 8.4 g packet     predniSONE (DELTASONE) 2.5 MG tablet     tacrolimus (GENERIC EQUIVALENT) 1 MG capsule     thin (NO BRAND SPECIFIED) lancets     ursodiol (ACTIGALL) 500 MG tablet     omeprazole (PRILOSEC) 20 MG DR capsule     No current facility-administered medications for this visit.      On physical examination, he sounds well.  His affect was appropriate.      His most recent laboratory tests were from May 8 and showed his white count was 5.7, hemoglobin 12.0 and platelets are 100,000.  His sodium is 137, potassium 4.8, BUN 45 and creatinine 1.42.  His AST is 25, ALT is 40 and alkaline phosphatase 283.  His albumin is 3.9 with a total of 7.3 and total bilirubin is 0.8.    Mr. Bhagat is now more than 3 years status post liver transplantation.  He was transplanted for  hepatocellular carcinoma.  There is no evidence of any recurrence at this point in time.  His kidney function is stable.  His liver function is also stable.  I will not be making any change to his medical regimen.  I will see him back in the clinic again in 6 months.       In terms of working up his hip pain, I and recommended he undergo an MRI as he is at risk for aseptic necrosis of the hip.      Thank you very much for allowing me to participate in the care of this patient.  If you have any questions regarding my recommendations, please do not hesitate to contact me.         Toñito Camejo MD      Professor of Medicine  [employer]      Executive Medical Director, [facility] Transplant Program  [facility]     Phone call duration: 15 minutes and 10 minutes of documentation.

## 2020-06-03 NOTE — PATIENT INSTRUCTIONS
Sports Medicine [phone] (4th Floor [facility])  Podiatry [phone] (4th Floor [facility])

## 2020-06-03 NOTE — TELEPHONE ENCOUNTER
[facility] Call Center    Phone Message    May a detailed message be left on voicemail: no     Reason for Call: Medication Refill Request    Has the patient contacted the pharmacy for the refill? Yes   Name of medication being requested: patiromer (VELTASSA) 8.4 g packet   Provider who prescribed the medication: MARIO Cazares  Pharmacy: [facility] - [address]   Date medication is needed: asap         Action Taken: Message routed to:  [facility] NEPHRO    Travel Screening: Not Applicable

## 2020-06-03 NOTE — TELEPHONE ENCOUNTER
[facility] Call Center    Phone Message    May a detailed message be left on voicemail: yes     Reason for Call: Form or Letter   Type or form/letter needing completion: Patient Enrollment Form  Provider: Dr. Briggs  Date form needed: ASAP  Once completed: Fax form to: [facility] #[phone]      Action Taken: Message routed to:  [facility] Nephro    Travel Screening: Not Applicable

## 2020-06-03 NOTE — LETTER
"    6/3/2020         RE: Camacho Bhagat  [address] [facility] [place] [address]        Dear Colleague,    Thank you for referring your patient, Camacho Bhagat, to the [facility] HEPATOLOGY. Please see a copy of my visit note below.    Camacho Bhagat is a 54 year old male who is being evaluated via a billable telephone visit.      The patient has been notified of following:     \"This telephone visit will be conducted via a call between you and your physician/provider. We have found that certain health care needs can be provided without the need for a physical exam.  This service lets us provide the care you need with a short phone conversation.  If a prescription is necessary we can send it directly to your pharmacy.  If lab work is needed we can place an order for that and you can then stop by our lab to have the test done at a later time.    Telephone visits are billed at different rates depending on your insurance coverage. During this emergency period, for some insurers they may be billed the same as an in-person visit.  Please reach out to your insurance provider with any questions.    If during the course of the call the physician/provider feels a telephone visit is not appropriate, you will not be charged for this service.\"    Patient has given verbal consent for Telephone visit?  Yes    What phone number would you like to be contacted at? [phone]    How would you like to obtain your AVS? Kwaku    I had the pleasure of seeing Camacho Bhagat for followup in the Liver Transplant Clinic at the [facility] on 06/03/2020.  Mr. Bhagat returns for followup now 3 years status post liver transplantation for hepatocellular carcinoma.      He is doing well at this visit.  He denies any abdominal pain, itching or skin rash or fatigue.  He denies any increased abdominal girth or lower extremity edema.  He denies any fevers or chills, cough or shortness of breath.  He denies any nausea or " vomiting, diarrhea or constipation.  His appetite has been good.  His weight has increased about while sheltering in place.      His only complaint he really has at this visit is bilateral hip pain.  It is fairly constantly.  It does get worse when he is standing for longer periods of time.  He did discuss this with Dr. Kirkpatrick and will begin an evaluation by an orthopedic surgeon.      Current Outpatient Medications   Medication     alcohol swab prep pads     amLODIPine (NORVASC) 10 MG tablet     blood glucose (NO BRAND SPECIFIED) test strip     blood glucose calibration (NO BRAND SPECIFIED) solution     blood glucose monitoring (NO BRAND SPECIFIED) meter device kit     cholecalciferol (VITAMIN D3) 1000 UNIT tablet     CIALIS 5 MG tablet     cyclobenzaprine (FLEXERIL) 10 MG tablet     fluticasone (FLONASE) 50 MCG/ACT nasal spray     furosemide (LASIX) 40 MG tablet     gabapentin (NEURONTIN) 300 MG capsule     Glucose Blood (BLOOD GLUCOSE TEST STRIPS) STRP     insulin glargine (LANTUS SOLOSTAR) 100 UNIT/ML pen     Insulin Lispro (HUMALOG KWIKPEN) 200 UNIT/ML soln     insulin pen needle (BD ENE U/F) 32G X 4 MM miscellaneous     metoprolol succinate ER (TOPROL-XL) 200 MG 24 hr tablet     multivitamin, therapeutic with minerals (MULTI-VITAMIN) TABS tablet     mycophenolic acid (GENERIC EQUIVALENT) 360 MG EC tablet     oxyCODONE (ROXICODONE) 5 MG tablet     patiromer (VELTASSA) 8.4 g packet     predniSONE (DELTASONE) 2.5 MG tablet     tacrolimus (GENERIC EQUIVALENT) 1 MG capsule     thin (NO BRAND SPECIFIED) lancets     ursodiol (ACTIGALL) 500 MG tablet     omeprazole (PRILOSEC) 20 MG DR capsule     No current facility-administered medications for this visit.      On physical examination, he sounds well.  His affect was appropriate.      His most recent laboratory tests were from May 8 and showed his white count was 5.7, hemoglobin 12.0 and platelets are 100,000.  His sodium is 137, potassium 4.8, BUN 45 and creatinine  1.42.  His AST is 25, ALT is 40 and alkaline phosphatase 283.  His albumin is 3.9 with a total of 7.3 and total bilirubin is 0.8.    Mr. Bhagat is now more than 3 years status post liver transplantation.  He was transplanted for hepatocellular carcinoma.  There is no evidence of any recurrence at this point in time.  His kidney function is stable.  His liver function is also stable.  I will not be making any change to his medical regimen.  I will see him back in the clinic again in 6 months.       In terms of working up his hip pain, I and recommended he undergo an MRI as he is at risk for aseptic necrosis of the hip.      Thank you very much for allowing me to participate in the care of this patient.  If you have any questions regarding my recommendations, please do not hesitate to contact me.         Toñito Camejo MD      Professor of Medicine  [employer]      Executive Medical Director, Solid Organ Transplant Program  [facility]     Phone call duration: 15 minutes and 10 minutes of documentation.

## 2020-06-08 NOTE — TELEPHONE ENCOUNTER
Spoke to [employer] representative, they will need Enrollment Form to be signed by Jovanna Cazares CNP and faxed over as they need to full fill yearly benefit investigation with his insurance as this changes yearly.  Cele Mancilla LPN  Nephrology  [phone]

## 2020-06-15 ENCOUNTER — MYC REFILL (OUTPATIENT)
Dept: TRANSPLANT | Facility: CLINIC | Age: 55
End: 2020-06-15

## 2020-06-15 ENCOUNTER — MYC REFILL (OUTPATIENT)
Dept: NEPHROLOGY | Facility: CLINIC | Age: 55
End: 2020-06-15

## 2020-06-15 DIAGNOSIS — Z79.60 LONG-TERM USE OF IMMUNOSUPPRESSANT MEDICATION: ICD-10-CM

## 2020-06-15 DIAGNOSIS — I10 ESSENTIAL HYPERTENSION: ICD-10-CM

## 2020-06-15 DIAGNOSIS — M54.50 LOW BACK PAIN AT MULTIPLE SITES: ICD-10-CM

## 2020-06-15 DIAGNOSIS — Z94.4 HISTORY OF LIVER TRANSPLANT (H): ICD-10-CM

## 2020-06-16 RX ORDER — METOPROLOL SUCCINATE 200 MG/1
200 TABLET, EXTENDED RELEASE ORAL DAILY
Qty: 90 TABLET | Refills: 3 | Status: SHIPPED | OUTPATIENT
Start: 2020-06-16 | End: 2020-12-09

## 2020-06-16 RX ORDER — MYCOPHENOLIC ACID 360 MG/1
720 TABLET, DELAYED RELEASE ORAL EVERY 12 HOURS
Qty: 360 TABLET | Refills: 3 | Status: SHIPPED | OUTPATIENT
Start: 2020-06-16 | End: 2021-08-31

## 2020-06-16 RX ORDER — GABAPENTIN 300 MG/1
300 CAPSULE ORAL 3 TIMES DAILY
Qty: 270 CAPSULE | Refills: 3 | Status: SHIPPED | OUTPATIENT
Start: 2020-06-16 | End: 2021-07-16

## 2020-07-02 ENCOUNTER — TELEPHONE (OUTPATIENT)
Dept: NEPHROLOGY | Facility: CLINIC | Age: 55
End: 2020-07-02

## 2020-07-02 ENCOUNTER — HOSPITAL ENCOUNTER (OUTPATIENT)
Dept: LAB | Facility: CLINIC | Age: 55
Discharge: HOME | End: 2020-07-02
Attending: INTERNAL MEDICINE
Payer: COMMERCIAL

## 2020-07-02 DIAGNOSIS — N18.2 CKD (CHRONIC KIDNEY DISEASE) STAGE 2, GFR 60-89 ML/MIN: ICD-10-CM

## 2020-07-02 DIAGNOSIS — Z13.220 LIPID SCREENING: ICD-10-CM

## 2020-07-02 DIAGNOSIS — Z94.4 LIVER REPLACED BY TRANSPLANT (H): ICD-10-CM

## 2020-07-02 LAB
ALBUMIN SERPL-MCNC: 3.8 G/DL (ref 3.4–5)
ALP SERPL-CCNC: 273 U/L (ref 40–150)
ALT SERPL W P-5'-P-CCNC: 31 U/L (ref 0–70)
ANION GAP SERPL CALCULATED.3IONS-SCNC: 4 MMOL/L (ref 3–14)
AST SERPL W P-5'-P-CCNC: 21 U/L (ref 0–45)
BILIRUB DIRECT SERPL-MCNC: 0.3 MG/DL (ref 0–0.2)
BILIRUB SERPL-MCNC: 0.7 MG/DL (ref 0.2–1.3)
BUN SERPL-MCNC: 49 MG/DL (ref 7–30)
CALCIUM SERPL-MCNC: 9 MG/DL (ref 8.5–10.1)
CHLORIDE SERPL-SCNC: 112 MMOL/L (ref 94–109)
CO2 SERPL-SCNC: 23 MMOL/L (ref 20–32)
CREAT SERPL-MCNC: 1.42 MG/DL (ref 0.66–1.25)
ERYTHROCYTE [DISTWIDTH] IN BLOOD BY AUTOMATED COUNT: 13.3 % (ref 10–15)
GFR SERPL CREATININE-BSD FRML MDRD: 55 ML/MIN/{1.73_M2}
GLUCOSE SERPL-MCNC: 112 MG/DL (ref 70–99)
HCT VFR BLD AUTO: 37.4 % (ref 40–53)
HGB BLD-MCNC: 12.3 G/DL (ref 13.3–17.7)
MCH RBC QN AUTO: 30.8 PG (ref 26.5–33)
MCHC RBC AUTO-ENTMCNC: 32.9 G/DL (ref 31.5–36.5)
MCV RBC AUTO: 94 FL (ref 78–100)
PHOSPHATE SERPL-MCNC: 2.2 MG/DL (ref 2.5–4.5)
PLATELET # BLD AUTO: 101 10E9/L (ref 150–450)
POTASSIUM SERPL-SCNC: 5.3 MMOL/L (ref 3.4–5.3)
PROT SERPL-MCNC: 7.4 G/DL (ref 6.8–8.8)
RBC # BLD AUTO: 4 10E12/L (ref 4.4–5.9)
SODIUM SERPL-SCNC: 139 MMOL/L (ref 133–144)
WBC # BLD AUTO: 5.8 10E9/L (ref 4–11)

## 2020-07-02 PROCEDURE — 80069 RENAL FUNCTION PANEL: CPT

## 2020-07-02 PROCEDURE — 36415 COLL VENOUS BLD VENIPUNCTURE: CPT | Performed by: INTERNAL MEDICINE

## 2020-07-02 PROCEDURE — 85027 COMPLETE CBC AUTOMATED: CPT

## 2020-07-02 PROCEDURE — 84155 ASSAY OF PROTEIN SERUM: CPT

## 2020-07-02 PROCEDURE — 80197 ASSAY OF TACROLIMUS: CPT | Performed by: INTERNAL MEDICINE

## 2020-07-02 PROCEDURE — 84460 ALANINE AMINO (ALT) (SGPT): CPT

## 2020-07-02 PROCEDURE — 84075 ASSAY ALKALINE PHOSPHATASE: CPT

## 2020-07-02 PROCEDURE — 82247 BILIRUBIN TOTAL: CPT

## 2020-07-02 PROCEDURE — 82248 BILIRUBIN DIRECT: CPT

## 2020-07-02 PROCEDURE — 84450 TRANSFERASE (AST) (SGOT): CPT

## 2020-07-02 NOTE — TELEPHONE ENCOUNTER
Signed [employer] form faxed. Will have scanned in chart.  Cele Mancilla LPN  Nephrology  [phone]

## 2020-07-03 ENCOUNTER — TELEPHONE (OUTPATIENT)
Dept: NEPHROLOGY | Facility: CLINIC | Age: 55
End: 2020-07-03

## 2020-07-03 DIAGNOSIS — E87.5 HYPERKALEMIA: ICD-10-CM

## 2020-07-03 LAB
TACROLIMUS BLD-MCNC: 6 UG/L (ref 5–15)
TME LAST DOSE: NORMAL H

## 2020-07-03 NOTE — TELEPHONE ENCOUNTER
Patient called to report that he will be out of [facility] in 3 days. Prescription was sent in to [facility], but they mail out medication and patient had already spoke them. He was told that his insurance wont allow for him to  at local pharmacy. [facility] closed today as is [facility] due to holiday. Patient will monitor for high potassium symptoms, he is already watching his potassium in his diet.  Cele Mancilla LPN  Nephrology  [phone]

## 2020-07-07 NOTE — TELEPHONE ENCOUNTER
Patient left voice message reporting that Daylin Dowell did receive request and in process. He said he was told he may not receive medication until this Friday. Patient reports that he has been monitoring his symptoms for elevated K and will report to clinic with any questions or concerns.  Cele Mancilla LPN  Nephrology  [phone]

## 2020-07-17 ENCOUNTER — TELEPHONE (OUTPATIENT)
Dept: NEPHROLOGY | Facility: CLINIC | Age: 55
End: 2020-07-17

## 2020-07-17 NOTE — TELEPHONE ENCOUNTER
Informed patient per Jovanna to repeat renal panel. He will get done next week.  Cele Mancilla LPN  Nephrology  [phone]

## 2020-07-17 NOTE — TELEPHONE ENCOUNTER
"Patient called to report that he did receive Veltassa after 12 days of being without it finally, after approved, not sure why this wasn't sent by [facility] pharmacy right away. He said he talked to a supervisor who sent him the medication right away, but didn't have the address or directions. Writer spoke to [facility] and this was not noted when writer called but was told \"he is scheduled for delivery and they will call him\".     Patient reports he had no potassium related issues or concerns but wondering if he should his labs checked?  Will route to Jaclyn Mancilla LPN  Nephrology  [phone]    "

## 2020-07-20 ENCOUNTER — HOSPITAL ENCOUNTER (OUTPATIENT)
Dept: LAB | Facility: CLINIC | Age: 55
Discharge: HOME | End: 2020-07-20
Payer: COMMERCIAL

## 2020-07-20 DIAGNOSIS — N18.2 CKD (CHRONIC KIDNEY DISEASE) STAGE 2, GFR 60-89 ML/MIN: ICD-10-CM

## 2020-07-20 LAB
ALBUMIN SERPL-MCNC: 4 G/DL (ref 3.4–5)
ANION GAP SERPL CALCULATED.3IONS-SCNC: 6 MMOL/L (ref 3–14)
BUN SERPL-MCNC: 54 MG/DL (ref 7–30)
CALCIUM SERPL-MCNC: 9 MG/DL (ref 8.5–10.1)
CHLORIDE SERPL-SCNC: 109 MMOL/L (ref 94–109)
CO2 SERPL-SCNC: 22 MMOL/L (ref 20–32)
CREAT SERPL-MCNC: 1.47 MG/DL (ref 0.66–1.25)
GFR SERPL CREATININE-BSD FRML MDRD: 53 ML/MIN/{1.73_M2}
GLUCOSE SERPL-MCNC: 147 MG/DL (ref 70–99)
PHOSPHATE SERPL-MCNC: 2.8 MG/DL (ref 2.5–4.5)
POTASSIUM SERPL-SCNC: 4.5 MMOL/L (ref 3.4–5.3)
SODIUM SERPL-SCNC: 137 MMOL/L (ref 133–144)

## 2020-07-20 PROCEDURE — 80069 RENAL FUNCTION PANEL: CPT

## 2020-07-20 PROCEDURE — 36415 COLL VENOUS BLD VENIPUNCTURE: CPT

## 2020-08-07 ASSESSMENT — ENCOUNTER SYMPTOMS
WEAKNESS: 0
STIFFNESS: 1
LOSS OF CONSCIOUSNESS: 0
TREMORS: 1
NUMBNESS: 1
PARALYSIS: 0
SEIZURES: 0
TINGLING: 1
DISTURBANCES IN COORDINATION: 0
DIZZINESS: 0
JOINT SWELLING: 1
SPEECH CHANGE: 0
MUSCLE CRAMPS: 0
ARTHRALGIAS: 1
HEADACHES: 1
MYALGIAS: 0
NECK PAIN: 1
MUSCLE WEAKNESS: 0
MEMORY LOSS: 0
BACK PAIN: 1
BRUISES/BLEEDS EASILY: 0
SWOLLEN GLANDS: 0

## 2020-08-12 ENCOUNTER — TELEPHONE (OUTPATIENT)
Dept: TRANSPLANT | Facility: CLINIC | Age: 55
End: 2020-08-12

## 2020-08-14 NOTE — TELEPHONE ENCOUNTER
RECORDS RECEIVED FROM: Internal   Date of Appt: 8/19/20   NOTES (FOR ALL VISITS) STATUS DETAILS   OFFICE NOTE from referring provider Internal Dr Alisa West @ [facility]:  5/11/20   OFFICE NOTE from other specialist N/A    DISCHARGE SUMMARY from hospital N/A    DISCHARGE REPORT from the ER N/A    OPERATIVE REPORT N/A    MEDICATION LIST Internal    IMAGING  (FOR ALL VISITS)     EMG N/A    EEG N/A    MRI (HEAD, NECK, SPINE) N/A    LUMBAR PUNCTURE N/A    DERRELL Scan N/A    CT (HEAD, NECK, SPINE) N/A

## 2020-08-17 ENCOUNTER — TELEPHONE (OUTPATIENT)
Dept: TRANSPLANT | Facility: CLINIC | Age: 55
End: 2020-08-17

## 2020-08-17 ENCOUNTER — MYC MEDICAL ADVICE (OUTPATIENT)
Dept: INTERNAL MEDICINE | Facility: CLINIC | Age: 55
End: 2020-08-17

## 2020-08-17 DIAGNOSIS — M25.552 BILATERAL HIP PAIN: Primary | ICD-10-CM

## 2020-08-17 DIAGNOSIS — M25.551 BILATERAL HIP PAIN: Primary | ICD-10-CM

## 2020-08-17 NOTE — TELEPHONE ENCOUNTER
Camacho has previously discussed bilateral hip pain with Dr. Camejo. Camacho has also discussed with his PCP but Dr. Kirkpatrick deferred to Dr. Camejo.     Discussed whether Dr. Camejo would like ortho referral or MRI.     MRI of bilateral hips ordered per Dr. Camejo. Camacho notified via [employer] message. Order placed and schedule request sent.

## 2020-08-19 ENCOUNTER — PRE VISIT (OUTPATIENT)
Dept: NEUROLOGY | Facility: CLINIC | Age: 55
End: 2020-08-19

## 2020-08-19 ENCOUNTER — OFFICE VISIT (OUTPATIENT)
Dept: NEUROLOGY | Facility: CLINIC | Age: 55
End: 2020-08-19
Attending: INTERNAL MEDICINE
Payer: COMMERCIAL

## 2020-08-19 ENCOUNTER — TELEPHONE (OUTPATIENT)
Dept: TRANSPLANT | Facility: CLINIC | Age: 55
End: 2020-08-19

## 2020-08-19 VITALS
OXYGEN SATURATION: 99 % | HEART RATE: 71 BPM | RESPIRATION RATE: 18 BRPM | SYSTOLIC BLOOD PRESSURE: 171 MMHG | DIASTOLIC BLOOD PRESSURE: 101 MMHG

## 2020-08-19 DIAGNOSIS — G62.9 NEUROPATHY: Primary | ICD-10-CM

## 2020-08-19 ASSESSMENT — PAIN SCALES - GENERAL: PAINLEVEL: SEVERE PAIN (6)

## 2020-08-19 NOTE — LETTER
8/19/2020       RE: Camacho Bhagat  [address] [facility] [place] [address]     Dear Colleague,    Thank you for referring your patient, Camacho Bhagat, to the [facility] NEUROLOGY at [facility]. Please see a copy of my visit note below.    [facility] Neurology Consultation    Camacho Bhagat MRN# [id number]   Age: 54 year old YOB: 1965     Requesting physician: Shay Kirkpatrick     Reason for Consultation: peripheral polyneuroapthy      History of Presenting Symptoms:   Camacho Bhagat is a 54 year old male who presents today for evaluation of peripheral polyneuropathy.  The patient has a complicated medical history including DMII with peripheral neuropathy, Liver transplant due to cirrhosis complicated by hepatocellular carcinoma on 3/4/2017 with ongoing immunosuppression (tacrolimus, MMF, prednisone) and prior chemotherapy (2 rounds, shot directly into the liver) with radiation, Fibromyalgia, Rheumatoid arthritis (oxycodone, flexeril), and foot with hip pain 2/2 degenerative changes in thoracic spine, ileostomy following colon perforation due to septic neutropenic enterocolitis, and history of gangrene of right index finger as well as his great toe on the right (2/2 low blood pressures).  While seeing his endocrinologist on 5/11/2020, the patient was noted to have tremor in his hands for one year, as well as 8-10 years of neuropathic pain of his left foot.  He asked for a second opinion about whether his neuropathy was related to his diabetes and for additional pain control.  He did report that he tried lyrica in the past without benefit.    Today, the patient notes that he has had neuropathic symptoms since 2012 or 2011.  Initially, he had mild tingling in his left foot with occasional pain (burning, shooting, light-needle prick). The neuropathy then progressed into his whole left foot then into his right foot.  His 2016 and 2017 issues/hospitalization led to worsened  symptoms, specifically of more pain in his feet.  When he lays down, he finds that his foot pain worsen.  There isn't necessarily a restlessness to his symptoms, just pain (throbbing dysesthesia that uncomfortable, like a bad cramp without the cramp).  There is no specific weakness with the patient's feet as per his report, and he has no major tripping events.  He does feel off-balance while in the shower, most notable since his transplant.  He does feel he drifts while walking, but attributes this to hip issues and musculoskeletal issues with his feet (balls of feet ache with walking).      The patient was seen through Neurology clinic with Dr. Licona on 1/20/2010 for six month of dizziness, light-headedness, gait issues.  At the time, he was having bifrontal headaches as well (4-5 a week, lasting 4-5 hours, associated with nausea/photophobia/phonophobia).  He was given amitrypitline 25 mg at bedtime for prophylaxis as well as an MRI brain (1/28/2010 - normal).  MRI brain was repeated in 2012, and also normal.      Past Medical History:     Patient Active Problem List   Diagnosis     Carpal tunnel syndrome     Testicular hypofunction     Fibromyalgia     Sicca syndrome (H)     Medication refill- do not delete      Hepatocellular carcinoma (H)     Osteoarthritis     Rheumatoid arthritis (H)     Hyperkalemia     Liver transplant recipient (H)     Immunosuppressed status (H)     Neutropenic colitis (H)     S/P liver transplant (H)     Pancytopenia (H)     Gangrene of finger (H)     Ischemia of both lower extremities     Elevated serum creatinine     History of creation of ostomy (H)     Peristomal skin complication     Painful periwound skin     Encounter for attention to ileostomy (H)     Ileostomy in place (H)     Abscess, intra-abdominal, postoperative     Liver transplant rejection (H)     CMV colitis (H)     Type 2 diabetes mellitus with diabetic polyneuropathy, with long-term current use of insulin (H)     Diarrhea  of infectious origin (Plesiomonas shigelloides (formerly known Aeromonas shigelloides) in 3/14/2019 sample)     Hypertension secondary to other renal disorders     Past Medical History:   Diagnosis Date     Depressive disorder, not elsewhere classified      Diabetes type 2, controlled (H) 11/10/2016     Esophageal reflux      Fibromyalgia 1/2009    dx with Dr Benitez( Rheum)     Gangrene of finger (H) 8/25/2017     H/O deep venous thrombosis 11/2001    Permanent IVC filter in place.     H/O CASTAÑEDA (nonalcoholic steatohepatitis)      H/O Pneumonia, organism unspecified(486) 10/2001    Included ARDS, sepsis, and  acute renal failure; hospitalized, required tracheostomy placement.     H/O: HTN (hypertension) 11/2001    No longer prescribed antihypertensive medication.       History of CVA (cerebrovascular accident)      History of hepatocellular carcinoma      History of liver transplant (H)      History of obstructive sleep apnea     No longer wears CPAP since losing approximately 200 pounds with his liver transplant and its complications.       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     Osteoarthritis      Presence of PERMANENT IVC filter      Rheumatoid arthritis(714.0)       Past Surgical History:     Past Surgical History:   Procedure Laterality Date     BENCH LIVER N/A 3/4/2017    Procedure: BENCH LIVER;  Surgeon: Jovan Tran MD;  Location:  OR      TOTAL KNEE ARTHROPLASTY  2008    Right knee arthroscopy     CHOLECYSTECTOMY       COLONOSCOPY N/A 7/21/2017    Procedure: COMBINED COLONOSCOPY, SINGLE OR MULTIPLE BIOPSY/POLYPECTOMY BY BIOPSY;  Colonoscopy;  Surgeon: Izaiah Montes MD;  Location:  GI     ENDOSCOPIC RETROGRADE CHOLANGIOPANCREATOGRAM N/A 5/22/2018    Procedure: COMBINED ENDOSCOPIC RETROGRADE  CHOLANGIOPANCREATOGRAPHY, PLACE TUBE/STENT;  Endoscopic Retrograde Cholangiopancreatography with sphincterotomy and pancreatic duct stent placement;  Surgeon: Zay Gaitan MD;  Location: [facility] OR     ENT SURGERY      Repair of deviated septum     ESOPHAGOSCOPY, GASTROSCOPY, DUODENOSCOPY (EGD), COMBINED N/A 8/4/2016    Procedure: COMBINED ESOPHAGOSCOPY, GASTROSCOPY, DUODENOSCOPY (EGD), BIOPSY SINGLE OR MULTIPLE;  Surgeon: Trent Pederson MD;  Location:  GI     ESOPHAGOSCOPY, GASTROSCOPY, DUODENOSCOPY (EGD), COMBINED N/A 8/27/2017    Procedure: COMBINED ESOPHAGOSCOPY, GASTROSCOPY, DUODENOSCOPY (EGD);;  Surgeon: Los Wynn MD;  Location: [facility] GI     INCISION AND DRAINAGE ABDOMEN WASHOUT, COMBINED Right 2/14/2018    Procedure: COMBINED INCISION AND DRAINAGE ABDOMEN WASHOUT;  COMBINED INCISION AND DRAINAGE right ABDOMEN flank;  Surgeon: Sara Dnih MD;  Location: [facility] OR     LAPAROTOMY EXPLORATORY N/A 7/4/2017    Procedure: LAPAROTOMY EXPLORATORY;  Exploratory Laparotomy, washout;  Surgeon: Tip Zhang MD;  Location: [facility] OR     LAPAROTOMY EXPLORATORY N/A 7/5/2017    Procedure: LAPAROTOMY EXPLORATORY;  Exploratory Laparotomy, Washout with closure.;  Surgeon: Tip Zhang MD;  Location: [facility] OR     LAPAROTOMY EXPLORATORY N/A 7/26/2017    Procedure: LAPAROTOMY EXPLORATORY;  Exploratory Laparotomy, Right angelique-colectomy, end ileostomy, mucosal fistula, partial omentectomy;  Surgeon: Sara Dinh MD;  Location: [facility] OR     ORTHOPEDIC SURGERY Right     Repair of dislocated wrist.       RELEASE TRIGGER FINGER Right 11/10/2017    Procedure: RELEASE TRIGGER FINGER;  Debride, V-Y Flap Right Index Finger;  Surgeon: Momo Noonan MD;  Location: [facility] OR     TAKEDOWN ILEOSTOMY N/A 1/5/2018    Procedure: TAKEDOWN ILEOSTOMY;  Exploratory Laparotomy, Lysis of Adhesions, Takedown Of End Ileostomy, Takedown of mucocutaneous fistula, ileocolic resection  And Colorectal  Anastomosis, Primary repair of Ventral Hernia, Anesthesia Block ;  Surgeon: Sara Dinh MD;  Location: [facility] OR     TRACHEOSTOMY      During hospitalization for pneumonia.       TRANSPLANT LIVER RECIPIENT  DONOR N/A 3/4/2017    Procedure: TRANSPLANT LIVER RECIPIENT  DONOR;  Surgeon: Jovan Tran MD;  Location: [facility] OR      Social History:     Tobacco Use     Smoking status: Former Smoker     Packs/day: 0.25     Years: 2.00     Pack years: 0.50     Types: Cigarettes     Last attempt to quit: 1985     Years since quittin.6     Smokeless tobacco: Former User     Types: Chew     Quit date: 10/8/2015     Tobacco comment: 1 Cigar once every other month.   Substance Use Topics     Alcohol use: No     Alcohol/week: 0.0 standard drinks     Comment: No alcohol since liver transplant.       Drug use: No        Family History:     Family History   Problem Relation Age of Onset     Diabetes Father      Hypertension Father      Substance Abuse Father      Arthritis Mother      Thyroid Cancer Mother         Survivor!     Cervical Cancer Maternal Grandmother      Cerebrovascular Disease Maternal Grandfather      No Known Problems Paternal Grandmother      Prostate Cancer Paternal Grandfather      Colon Cancer No family hx of      Hyperlipidemia No family hx of      Coronary Artery Disease No family hx of      Breast Cancer No family hx of         Medications:     Current Outpatient Medications   Medication Sig     alcohol swab prep pads Use to swab area of injection/francisca as directed.     amLODIPine (NORVASC) 10 MG tablet Take 1 tablet (10 mg) by mouth daily     blood glucose (NO BRAND SPECIFIED) test strip Use to test blood sugar 3 times daily or as directed. Any covered brand preferred by Pt that works with meter.     blood glucose calibration (NO BRAND SPECIFIED) solution Use to calibrate meter.     blood glucose monitoring (NO BRAND SPECIFIED) meter device kit Use to test blood sugar  3 times daily or as directed. Any covered brand preferred by Pt.     cholecalciferol (VITAMIN D3) 1000 UNIT tablet Take 1 tablet (1,000 Units) by mouth daily (Patient taking differently: Take 1,000 Units by mouth every morning )     CIALIS 5 MG tablet Take 5 mg by mouth as needed      cyclobenzaprine (FLEXERIL) 10 MG tablet Take 1 tablet (10 mg) by mouth 3 times daily as needed for muscle spasms     fluticasone (FLONASE) 50 MCG/ACT nasal spray Spray 1 spray into both nostrils daily     furosemide (LASIX) 40 MG tablet Take 1 tablet (40 mg) by mouth 2 times daily     gabapentin (NEURONTIN) 300 MG capsule Take 1 capsule (300 mg) by mouth 3 times daily     Glucose Blood (BLOOD GLUCOSE TEST STRIPS) STRP 1 strip by Lancet route 3 times daily     insulin glargine (LANTUS SOLOSTAR) 100 UNIT/ML pen Inject 55 Units Subcutaneous every morning     Insulin Lispro (HUMALOG KWIKPEN) 200 UNIT/ML soln Use one unit per 3 grams carbohydrates for all meals and snacks. Total daily dose up to 60 U.     insulin pen needle (BD ENE U/F) 32G X 4 MM miscellaneous Use 1 pen needle 4 times daily or as directed.     metoprolol succinate ER (TOPROL-XL) 200 MG 24 hr tablet Take 1 tablet (200 mg) by mouth daily Take a total of 150 mg daily     multivitamin, therapeutic with minerals (MULTI-VITAMIN) TABS tablet Take 1 tablet by mouth every morning     mycophenolic acid (GENERIC EQUIVALENT) 360 MG EC tablet Take 2 tablets (720 mg) by mouth every 12 hours     omeprazole (PRILOSEC) 20 MG DR capsule Take 1 capsule (20 mg) by mouth daily     oxyCODONE (ROXICODONE) 5 MG tablet Take 1 tablet (5 mg) by mouth every 4 hours as needed for pain maximum 6 tablet(s) per day     patiromer (VELTASSA) 8.4 g packet Take 8.4 g by mouth daily     predniSONE (DELTASONE) 2.5 MG tablet Take 1 tablet (2.5 mg) by mouth daily     tacrolimus (GENERIC EQUIVALENT) 1 MG capsule Take 5 capsules (5 mg) by mouth every 12 hours     thin (NO BRAND SPECIFIED) lancets Use with  lanceting device. Any covered brand.     ursodiol (ACTIGALL) 500 MG tablet Take 1 tablet (500 mg) by mouth 2 times daily      Allergies:     Allergies   Allergen Reactions     Erythromycin GI Disturbance     Losartan Other (See Comments)     Consistently develops hyperkalemia     Vioxx      Nausea, vomiting        Review of Systems:   A comprehensive 10 point review of systems (constitutional, ENT, cardiac, peripheral vascular, lymphatic, respiratory, GI, , Musculoskeletal, skin, Neurological) was performed and found to be negative except as described in this note.      Physical Exam:   Vitals: BP (!) 171/101   Pulse 71   Resp 18   SpO2 99%    General: Seated comfortably in no acute distress. Jovial in nature, good spirited, well dressed.  HEENT: Neck supple with normal range of motion. No paracervical muscle tenderness or tightness.  Optic discs sharp and vasculature normal on funduscopic exam.   Heart: Regular rate  Lungs: breathing comfortably  GI: Non tender  Extremities: no edema  Skin: No rashes  Neurologic:     Mental Status: Fully alert, attentive and oriented. Speech clear and fluent, no paraphasic errors.      Cranial Nerves: Visual fields intact. PERRL. EOMI with normal smooth pursuit. Facial sensation intact/symmetric. Facial movements symmetric. Hearing not formally tested but intact to conversation. Palate elevation symmetric, uvula midline. No dysarthria. Shoulder shrug strong bilaterally. Tongue protrusion midline.     Motor:postural tremor in b/l arm worse with extension and small in amplitude with high frequency (enhanced physiological tremor). Muscle tone normal throughout. No pronator drift. Normal/symmetric rapid finger tapping. Strength 5/5 throughout upper and lower extremities.     Deep Tendon Reflexes: 2+/symmetric throughout upper extremities, 1+ symmetric in patellar and achilles. No clonus. Toes mute bilaterally.     Sensory: Intact/symmetric to light touch, pinprick, temperature,  vibration and proprioception throughout upper extremities. Pinprick discrimination and two point discrimination is poor at toes, ankles, and mid leg b/l. Vibration absent at toes b/l, 4 seconds at right ankles, 0-1 seconds at left ankle, 10+ seconds at mid leg on right, 5-6 seconds at mid leg on left, 12+ seconds at knees b/l.  Positive Romberg.      Coordination: Finger-nose-finger and heel-shin intact without dysmetria but there is tremor during motion (enhanced physiological). Rapid alternating movements intact/symmetric with normal speed and rhythm.     Gait: Normal, steady casual gait. Able to walk on toes, heels. Tandem walking is poor and the patient can fall in either direction or cross his heels over one another.          Assessment and Plan:   Assessment:  Sensory polyneuropathy with secondary sensorimotor ataxia    The patient has ongoing sensory dysesthesias with some ataxia during gait and standing which are likely due to both small and large nerve fiber damage.  Most likely, this is secondary to diabetes and posibly long term steroid use.  However, given his ileostomy and cancer history, further testing for common causes of neuropathy should be looked into.  He has no major alcohol history, no hepatitis history, and his chemotherapy is unlikely to have caused any degree of neuropathy given the localization of the medication itself.      The patient's tremor seems less likely to be due to gabapentin toxicity and more likely related to enhanced physiological tremor 2/2 prednisone.  Generally, gabapentin leads to myoclonus in the setting of poor renal excretion and this is not likely occurring at the patient's current dose given his exam and clinical history.     Plan:  - Vit D, B1, B6, B12, E, Copper, Zinc, SPEP, CRP, ESR, Iron and iron binding, ferritin, Homocysteine, MMA, Lyme corinna.  - Alpha lipoic acid 600 mg every day  - Will consider increasing gabapentin dose after testing is completed    Follow up  in Neurology clinic in 6 months or earlier as needed should new concerns arise.    BASILIA Segura D.O.   of Neurology      Greater than 50% of the total time (60 min) in this patient encounter was spent on counseling and/or coordination of care. We reviewed diagnostic results, impressions, and discussed other possible tests if symptoms do not improve. We discussed the implications of the diagnosis, as well as risks and benefits of management options. We reviewed treatment instructions and our scheduled follow-up as specified in the discharge plan. We also discussed the importance of compliance with the chosen course of treatment. The patient is in agreement with this plan and has no further questions.      Again, thank you for allowing me to participate in the care of your patient.  Sincerely,    Gilbert Segura, DO

## 2020-08-19 NOTE — TELEPHONE ENCOUNTER
t seen Neurology today  They want to start Alpha Lipoic acid  Wonders if it is ok to take with his Tx meds

## 2020-08-19 NOTE — PATIENT INSTRUCTIONS
You have a sensory polyneuropathy that is likely due to diabetes. We will look into other common causes with some basic laboratory tests.  While awaiting your tests to return, we will start you on alpha lipoic acid 600 mg every day.  - Return to clinic in 6 months (can be a phone call) to discuss progression and other treatment options

## 2020-08-19 NOTE — PROGRESS NOTES
[facility] Neurology Consultation    Camacho Bhagat MRN# [id number]   Age: 54 year old YOB: 1965     Requesting physician: Shay Kirkpatrick     Reason for Consultation: peripheral polyneuroapthy      History of Presenting Symptoms:   Camacho Bhagat is a 54 year old male who presents today for evaluation of peripheral polyneuropathy.  The patient has a complicated medical history including DMII with peripheral neuropathy, Liver transplant due to cirrhosis complicated by hepatocellular carcinoma on 3/4/2017 with ongoing immunosuppression (tacrolimus, MMF, prednisone) and prior chemotherapy (2 rounds, shot directly into the liver) with radiation, Fibromyalgia, Rheumatoid arthritis (oxycodone, flexeril), and foot with hip pain 2/2 degenerative changes in thoracic spine, ileostomy following colon perforation due to septic neutropenic enterocolitis, and history of gangrene of right index finger as well as his great toe on the right (2/2 low blood pressures).  While seeing his endocrinologist on 5/11/2020, the patient was noted to have tremor in his hands for one year, as well as 8-10 years of neuropathic pain of his left foot.  He asked for a second opinion about whether his neuropathy was related to his diabetes and for additional pain control.  He did report that he tried lyrica in the past without benefit.    Today, the patient notes that he has had neuropathic symptoms since 2012 or 2011.  Initially, he had mild tingling in his left foot with occasional pain (burning, shooting, light-needle prick). The neuropathy then progressed into his whole left foot then into his right foot.  His 2016 and 2017 issues/hospitalization led to worsened symptoms, specifically of more pain in his feet.  When he lays down, he finds that his foot pain worsen.  There isn't necessarily a restlessness to his symptoms, just pain (throbbing dysesthesia that uncomfortable, like a bad cramp without the cramp).  There is no specific weakness  with the patient's feet as per his report, and he has no major tripping events.  He does feel off-balance while in the shower, most notable since his transplant.  He does feel he drifts while walking, but attributes this to hip issues and musculoskeletal issues with his feet (balls of feet ache with walking).      The patient was seen through Neurology clinic with Dr. Licona on 1/20/2010 for six month of dizziness, light-headedness, gait issues.  At the time, he was having bifrontal headaches as well (4-5 a week, lasting 4-5 hours, associated with nausea/photophobia/phonophobia).  He was given amitrypitline 25 mg at bedtime for prophylaxis as well as an MRI brain (1/28/2010 - normal).  MRI brain was repeated in 2012, and also normal.      Past Medical History:     Patient Active Problem List   Diagnosis     Carpal tunnel syndrome     Testicular hypofunction     Fibromyalgia     Sicca syndrome (H)     Medication refill- do not delete      Hepatocellular carcinoma (H)     Osteoarthritis     Rheumatoid arthritis (H)     Hyperkalemia     Liver transplant recipient (H)     Immunosuppressed status (H)     Neutropenic colitis (H)     S/P liver transplant (H)     Pancytopenia (H)     Gangrene of finger (H)     Ischemia of both lower extremities     Elevated serum creatinine     History of creation of ostomy (H)     Peristomal skin complication     Painful periwound skin     Encounter for attention to ileostomy (H)     Ileostomy in place (H)     Abscess, intra-abdominal, postoperative     Liver transplant rejection (H)     CMV colitis (H)     Type 2 diabetes mellitus with diabetic polyneuropathy, with long-term current use of insulin (H)     Diarrhea of infectious origin (Plesiomonas shigelloides (formerly known Aeromonas shigelloides) in 3/14/2019 sample)     Hypertension secondary to other renal disorders     Past Medical History:   Diagnosis Date     Depressive disorder, not elsewhere classified      Diabetes type 2,  controlled (H) 11/10/2016     Esophageal reflux      Fibromyalgia 1/2009    dx with Dr Benitez( Rheum)     Gangrene of finger (H) 8/25/2017     H/O deep venous thrombosis 11/2001    Permanent IVC filter in place.     H/O CASTAÑEDA (nonalcoholic steatohepatitis)      H/O Pneumonia, organism unspecified(486) 10/2001    Included ARDS, sepsis, and  acute renal failure; hospitalized, required tracheostomy placement.     H/O: HTN (hypertension) 11/2001    No longer prescribed antihypertensive medication.       History of CVA (cerebrovascular accident)      History of hepatocellular carcinoma      History of liver transplant (H)      History of obstructive sleep apnea     No longer wears CPAP since losing approximately 200 pounds with his liver transplant and its complications.       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     Osteoarthritis      Presence of PERMANENT IVC filter      Rheumatoid arthritis(714.0)       Past Surgical History:     Past Surgical History:   Procedure Laterality Date     BENCH LIVER N/A 3/4/2017    Procedure: BENCH LIVER;  Surgeon: Jovan Tran MD;  Location: [facility] TOTAL KNEE ARTHROPLASTY  2008    Right knee arthroscopy     CHOLECYSTECTOMY       COLONOSCOPY N/A 7/21/2017    Procedure: COMBINED COLONOSCOPY, SINGLE OR MULTIPLE BIOPSY/POLYPECTOMY BY BIOPSY;  Colonoscopy;  Surgeon: Izaiah Montes MD;  Location:  GI     ENDOSCOPIC RETROGRADE CHOLANGIOPANCREATOGRAM N/A 5/22/2018    Procedure: COMBINED ENDOSCOPIC RETROGRADE CHOLANGIOPANCREATOGRAPHY, PLACE TUBE/STENT;  Endoscopic Retrograde Cholangiopancreatography with sphincterotomy and pancreatic duct stent placement;  Surgeon: Zay Gaitan MD;  Location:  OR     ENT SURGERY      Repair of deviated septum     ESOPHAGOSCOPY, GASTROSCOPY, DUODENOSCOPY  (EGD), COMBINED N/A 2016    Procedure: COMBINED ESOPHAGOSCOPY, GASTROSCOPY, DUODENOSCOPY (EGD), BIOPSY SINGLE OR MULTIPLE;  Surgeon: Trent Pederson MD;  Location: [facility] GI     ESOPHAGOSCOPY, GASTROSCOPY, DUODENOSCOPY (EGD), COMBINED N/A 2017    Procedure: COMBINED ESOPHAGOSCOPY, GASTROSCOPY, DUODENOSCOPY (EGD);;  Surgeon: Los Wynn MD;  Location: [facility] GI     INCISION AND DRAINAGE ABDOMEN WASHOUT, COMBINED Right 2018    Procedure: COMBINED INCISION AND DRAINAGE ABDOMEN WASHOUT;  COMBINED INCISION AND DRAINAGE right ABDOMEN flank;  Surgeon: Sara Dinh MD;  Location: [facility] OR     LAPAROTOMY EXPLORATORY N/A 2017    Procedure: LAPAROTOMY EXPLORATORY;  Exploratory Laparotomy, washout;  Surgeon: Tip Zhang MD;  Location: [facility] OR     LAPAROTOMY EXPLORATORY N/A 2017    Procedure: LAPAROTOMY EXPLORATORY;  Exploratory Laparotomy, Washout with closure.;  Surgeon: Tip Zhang MD;  Location: [facility] OR     LAPAROTOMY EXPLORATORY N/A 2017    Procedure: LAPAROTOMY EXPLORATORY;  Exploratory Laparotomy, Right angelique-colectomy, end ileostomy, mucosal fistula, partial omentectomy;  Surgeon: Sara Dinh MD;  Location: [facility] OR     ORTHOPEDIC SURGERY Right     Repair of dislocated wrist.       RELEASE TRIGGER FINGER Right 11/10/2017    Procedure: RELEASE TRIGGER FINGER;  Debride, V-Y Flap Right Index Finger;  Surgeon: Momo Noonan MD;  Location: [facility] OR     TAKEDOWN ILEOSTOMY N/A 2018    Procedure: TAKEDOWN ILEOSTOMY;  Exploratory Laparotomy, Lysis of Adhesions, Takedown Of End Ileostomy, Takedown of mucocutaneous fistula, ileocolic resection  And Colorectal Anastomosis, Primary repair of Ventral Hernia, Anesthesia Block ;  Surgeon: Sara Dinh MD;  Location: [facility] OR     TRACHEOSTOMY  2001    During hospitalization for pneumonia.       TRANSPLANT LIVER RECIPIENT  DONOR N/A 3/4/2017    Procedure: TRANSPLANT LIVER RECIPIENT   DONOR;  Surgeon: Jovan Tran MD;  Location:  OR      Social History:     Tobacco Use     Smoking status: Former Smoker     Packs/day: 0.25     Years: 2.00     Pack years: 0.50     Types: Cigarettes     Last attempt to quit: 1985     Years since quittin.6     Smokeless tobacco: Former User     Types: Chew     Quit date: 10/8/2015     Tobacco comment: 1 Cigar once every other month.   Substance Use Topics     Alcohol use: No     Alcohol/week: 0.0 standard drinks     Comment: No alcohol since liver transplant.       Drug use: No        Family History:     Family History   Problem Relation Age of Onset     Diabetes Father      Hypertension Father      Substance Abuse Father      Arthritis Mother      Thyroid Cancer Mother         Survivor!     Cervical Cancer Maternal Grandmother      Cerebrovascular Disease Maternal Grandfather      No Known Problems Paternal Grandmother      Prostate Cancer Paternal Grandfather      Colon Cancer No family hx of      Hyperlipidemia No family hx of      Coronary Artery Disease No family hx of      Breast Cancer No family hx of         Medications:     Current Outpatient Medications   Medication Sig     alcohol swab prep pads Use to swab area of injection/francisca as directed.     amLODIPine (NORVASC) 10 MG tablet Take 1 tablet (10 mg) by mouth daily     blood glucose (NO BRAND SPECIFIED) test strip Use to test blood sugar 3 times daily or as directed. Any covered brand preferred by Pt that works with meter.     blood glucose calibration (NO BRAND SPECIFIED) solution Use to calibrate meter.     blood glucose monitoring (NO BRAND SPECIFIED) meter device kit Use to test blood sugar 3 times daily or as directed. Any covered brand preferred by Pt.     cholecalciferol (VITAMIN D3) 1000 UNIT tablet Take 1 tablet (1,000 Units) by mouth daily (Patient taking differently: Take 1,000 Units by mouth every morning )     CIALIS 5 MG tablet Take 5 mg by mouth as needed       cyclobenzaprine (FLEXERIL) 10 MG tablet Take 1 tablet (10 mg) by mouth 3 times daily as needed for muscle spasms     fluticasone (FLONASE) 50 MCG/ACT nasal spray Spray 1 spray into both nostrils daily     furosemide (LASIX) 40 MG tablet Take 1 tablet (40 mg) by mouth 2 times daily     gabapentin (NEURONTIN) 300 MG capsule Take 1 capsule (300 mg) by mouth 3 times daily     Glucose Blood (BLOOD GLUCOSE TEST STRIPS) STRP 1 strip by Lancet route 3 times daily     insulin glargine (LANTUS SOLOSTAR) 100 UNIT/ML pen Inject 55 Units Subcutaneous every morning     Insulin Lispro (HUMALOG KWIKPEN) 200 UNIT/ML soln Use one unit per 3 grams carbohydrates for all meals and snacks. Total daily dose up to 60 U.     insulin pen needle (BD ENE U/F) 32G X 4 MM miscellaneous Use 1 pen needle 4 times daily or as directed.     metoprolol succinate ER (TOPROL-XL) 200 MG 24 hr tablet Take 1 tablet (200 mg) by mouth daily Take a total of 150 mg daily     multivitamin, therapeutic with minerals (MULTI-VITAMIN) TABS tablet Take 1 tablet by mouth every morning     mycophenolic acid (GENERIC EQUIVALENT) 360 MG EC tablet Take 2 tablets (720 mg) by mouth every 12 hours     omeprazole (PRILOSEC) 20 MG DR capsule Take 1 capsule (20 mg) by mouth daily     oxyCODONE (ROXICODONE) 5 MG tablet Take 1 tablet (5 mg) by mouth every 4 hours as needed for pain maximum 6 tablet(s) per day     patiromer (VELTASSA) 8.4 g packet Take 8.4 g by mouth daily     predniSONE (DELTASONE) 2.5 MG tablet Take 1 tablet (2.5 mg) by mouth daily     tacrolimus (GENERIC EQUIVALENT) 1 MG capsule Take 5 capsules (5 mg) by mouth every 12 hours     thin (NO BRAND SPECIFIED) lancets Use with lanceting device. Any covered brand.     ursodiol (ACTIGALL) 500 MG tablet Take 1 tablet (500 mg) by mouth 2 times daily      Allergies:     Allergies   Allergen Reactions     Erythromycin GI Disturbance     Losartan Other (See Comments)     Consistently develops hyperkalemia     Vioxx       Nausea, vomiting        Review of Systems:   A comprehensive 10 point review of systems (constitutional, ENT, cardiac, peripheral vascular, lymphatic, respiratory, GI, , Musculoskeletal, skin, Neurological) was performed and found to be negative except as described in this note.      Physical Exam:   Vitals: BP (!) 171/101   Pulse 71   Resp 18   SpO2 99%    General: Seated comfortably in no acute distress. Jovial in nature, good spirited, well dressed.  HEENT: Neck supple with normal range of motion. No paracervical muscle tenderness or tightness.  Optic discs sharp and vasculature normal on funduscopic exam.   Heart: Regular rate  Lungs: breathing comfortably  GI: Non tender  Extremities: no edema  Skin: No rashes  Neurologic:     Mental Status: Fully alert, attentive and oriented. Speech clear and fluent, no paraphasic errors.      Cranial Nerves: Visual fields intact. PERRL. EOMI with normal smooth pursuit. Facial sensation intact/symmetric. Facial movements symmetric. Hearing not formally tested but intact to conversation. Palate elevation symmetric, uvula midline. No dysarthria. Shoulder shrug strong bilaterally. Tongue protrusion midline.     Motor:postural tremor in b/l arm worse with extension and small in amplitude with high frequency (enhanced physiological tremor). Muscle tone normal throughout. No pronator drift. Normal/symmetric rapid finger tapping. Strength 5/5 throughout upper and lower extremities.     Deep Tendon Reflexes: 2+/symmetric throughout upper extremities, 1+ symmetric in patellar and achilles. No clonus. Toes mute bilaterally.     Sensory: Intact/symmetric to light touch, pinprick, temperature, vibration and proprioception throughout upper extremities. Pinprick discrimination and two point discrimination is poor at toes, ankles, and mid leg b/l. Vibration absent at toes b/l, 4 seconds at right ankles, 0-1 seconds at left ankle, 10+ seconds at mid leg on right, 5-6 seconds at mid leg  on left, 12+ seconds at knees b/l.  Positive Romberg.      Coordination: Finger-nose-finger and heel-shin intact without dysmetria but there is tremor during motion (enhanced physiological). Rapid alternating movements intact/symmetric with normal speed and rhythm.     Gait: Normal, steady casual gait. Able to walk on toes, heels. Tandem walking is poor and the patient can fall in either direction or cross his heels over one another.          Assessment and Plan:   Assessment:  Sensory polyneuropathy with secondary sensorimotor ataxia    The patient has ongoing sensory dysesthesias with some ataxia during gait and standing which are likely due to both small and large nerve fiber damage.  Most likely, this is secondary to diabetes and posibly long term steroid use.  However, given his ileostomy and cancer history, further testing for common causes of neuropathy should be looked into.  He has no major alcohol history, no hepatitis history, and his chemotherapy is unlikely to have caused any degree of neuropathy given the localization of the medication itself.      The patient's tremor seems less likely to be due to gabapentin toxicity and more likely related to enhanced physiological tremor 2/2 prednisone.  Generally, gabapentin leads to myoclonus in the setting of poor renal excretion and this is not likely occurring at the patient's current dose given his exam and clinical history.     Plan:  - Vit D, B1, B6, B12, E, Copper, Zinc, SPEP, CRP, ESR, Iron and iron binding, ferritin, Homocysteine, MMA, Lyme corinna.  - Alpha lipoic acid 600 mg every day  - Will consider increasing gabapentin dose after testing is completed    Follow up in Neurology clinic in 6 months or earlier as needed should new concerns arise.    BASILIA Segura D.O.   of Neurology      Greater than 50% of the total time (60 min) in this patient encounter was spent on counseling and/or coordination of care. We reviewed diagnostic  results, impressions, and discussed other possible tests if symptoms do not improve. We discussed the implications of the diagnosis, as well as risks and benefits of management options. We reviewed treatment instructions and our scheduled follow-up as specified in the discharge plan. We also discussed the importance of compliance with the chosen course of treatment. The patient is in agreement with this plan and has no further questions.

## 2020-08-19 NOTE — TELEPHONE ENCOUNTER
Returned call. Camacho was seen by neurology for neuropathy and they would like him to start Alpha Lipoic Acid 600 mg daily. Camacho wants to be sure it is OK to take with his transplant medications.     Message sent to Ryder Yepez, Pharm D for review. Camacho notified.

## 2020-08-20 ENCOUNTER — TELEPHONE (OUTPATIENT)
Dept: NEPHROLOGY | Facility: CLINIC | Age: 55
End: 2020-08-20

## 2020-08-20 NOTE — TELEPHONE ENCOUNTER
Patient left voice message saying that he was started on Alpha Lipoic Acid 600 mg daily for neuropathy from his neurologist and wanted to be sure this was ok for his kidneys.   Will send message to Jaclyn Mancilla LPN  Nephrology  [phone]

## 2020-08-21 ENCOUNTER — HOSPITAL ENCOUNTER (OUTPATIENT)
Dept: LAB | Facility: CLINIC | Age: 55
Discharge: HOME | End: 2020-08-21
Attending: INTERNAL MEDICINE | Admitting: PSYCHIATRY & NEUROLOGY
Payer: COMMERCIAL

## 2020-08-21 DIAGNOSIS — G62.9 NEUROPATHY: ICD-10-CM

## 2020-08-21 LAB
CRP SERPL-MCNC: 3.3 MG/L (ref 0–8)
ERYTHROCYTE [SEDIMENTATION RATE] IN BLOOD BY WESTERGREN METHOD: 22 MM/H (ref 0–20)
FERRITIN SERPL-MCNC: 1098 NG/ML (ref 26–388)
IRON SATN MFR SERPL: 52 % (ref 15–46)
IRON SERPL-MCNC: 123 UG/DL (ref 35–180)
TIBC SERPL-MCNC: 235 UG/DL (ref 240–430)
VIT B12 SERPL-MCNC: 863 PG/ML (ref 193–986)

## 2020-08-21 PROCEDURE — 84165 PROTEIN E-PHORESIS SERUM: CPT | Performed by: PSYCHIATRY & NEUROLOGY

## 2020-08-21 PROCEDURE — 84446 ASSAY OF VITAMIN E: CPT | Performed by: PSYCHIATRY & NEUROLOGY

## 2020-08-21 PROCEDURE — 86618 LYME DISEASE ANTIBODY: CPT | Performed by: PSYCHIATRY & NEUROLOGY

## 2020-08-21 PROCEDURE — 82728 ASSAY OF FERRITIN: CPT | Performed by: PSYCHIATRY & NEUROLOGY

## 2020-08-21 PROCEDURE — 83540 ASSAY OF IRON: CPT | Performed by: PSYCHIATRY & NEUROLOGY

## 2020-08-21 PROCEDURE — 82607 VITAMIN B-12: CPT | Performed by: PSYCHIATRY & NEUROLOGY

## 2020-08-21 PROCEDURE — 00000402 ZZHCL STATISTIC TOTAL PROTEIN: Performed by: PSYCHIATRY & NEUROLOGY

## 2020-08-21 PROCEDURE — 84630 ASSAY OF ZINC: CPT | Performed by: PSYCHIATRY & NEUROLOGY

## 2020-08-21 PROCEDURE — 86140 C-REACTIVE PROTEIN: CPT | Performed by: PSYCHIATRY & NEUROLOGY

## 2020-08-21 PROCEDURE — 84425 ASSAY OF VITAMIN B-1: CPT | Performed by: PSYCHIATRY & NEUROLOGY

## 2020-08-21 PROCEDURE — 83921 ORGANIC ACID SINGLE QUANT: CPT | Performed by: PSYCHIATRY & NEUROLOGY

## 2020-08-21 PROCEDURE — 36415 COLL VENOUS BLD VENIPUNCTURE: CPT | Performed by: PSYCHIATRY & NEUROLOGY

## 2020-08-21 PROCEDURE — 83090 ASSAY OF HOMOCYSTEINE: CPT | Performed by: PSYCHIATRY & NEUROLOGY

## 2020-08-21 PROCEDURE — 83550 IRON BINDING TEST: CPT | Performed by: PSYCHIATRY & NEUROLOGY

## 2020-08-21 PROCEDURE — 84207 ASSAY OF VITAMIN B-6: CPT | Performed by: PSYCHIATRY & NEUROLOGY

## 2020-08-21 PROCEDURE — 82306 VITAMIN D 25 HYDROXY: CPT | Performed by: PSYCHIATRY & NEUROLOGY

## 2020-08-21 PROCEDURE — 85652 RBC SED RATE AUTOMATED: CPT | Performed by: PSYCHIATRY & NEUROLOGY

## 2020-08-22 LAB — B BURGDOR IGG+IGM SER QL: 0.1 (ref 0–0.89)

## 2020-08-23 LAB
A-TOCOPHEROL VIT E SERPL-MCNC: 16.7 MG/L (ref 5.5–18)
BETA+GAMMA TOCOPHEROL SERPL-MCNC: 0.5 MG/L (ref 0–6)
DEPRECATED CALCIDIOL+CALCIFEROL SERPL-MC: 40 UG/L (ref 20–75)

## 2020-08-24 LAB
ALBUMIN SERPL ELPH-MCNC: 4.3 G/DL (ref 3.7–5.1)
ALPHA1 GLOB SERPL ELPH-MCNC: 0.3 G/DL (ref 0.2–0.4)
ALPHA2 GLOB SERPL ELPH-MCNC: 0.7 G/DL (ref 0.5–0.9)
B-GLOBULIN SERPL ELPH-MCNC: 0.6 G/DL (ref 0.6–1)
GAMMA GLOB SERPL ELPH-MCNC: 0.8 G/DL (ref 0.7–1.6)
M PROTEIN SERPL ELPH-MCNC: 0 G/DL
PROT PATTERN SERPL ELPH-IMP: NORMAL
VIT B6 SERPL-MCNC: 61.8 NMOL/L (ref 20–125)

## 2020-08-25 LAB — VIT B1 BLD-MCNC: 134 NMOL/L (ref 70–180)

## 2020-08-26 LAB — HCYS SERPL-SCNC: 13.2 UMOL/L (ref 4–12)

## 2020-08-27 LAB
METHYLMALONATE SERPL-SCNC: 0.3 UMOL/L (ref 0–0.4)
ZINC SERPL-MCNC: 80.1 UG/DL (ref 60–120)

## 2020-09-09 ENCOUNTER — ANCILLARY PROCEDURE (OUTPATIENT)
Dept: MRI IMAGING | Facility: CLINIC | Age: 55
End: 2020-09-09
Attending: INTERNAL MEDICINE
Payer: COMMERCIAL

## 2020-09-09 ENCOUNTER — TELEPHONE (OUTPATIENT)
Dept: TRANSPLANT | Facility: CLINIC | Age: 55
End: 2020-09-09

## 2020-09-09 DIAGNOSIS — M25.551 BILATERAL HIP PAIN: ICD-10-CM

## 2020-09-09 DIAGNOSIS — M25.552 BILATERAL HIP PAIN: ICD-10-CM

## 2020-09-09 ASSESSMENT — ENCOUNTER SYMPTOMS
SEIZURES: 0
DISTURBANCES IN COORDINATION: 0
MUSCLE CRAMPS: 1
ARTHRALGIAS: 1
BACK PAIN: 1
JOINT SWELLING: 0
HEADACHES: 1
STIFFNESS: 1
NECK PAIN: 1
TREMORS: 0
PARALYSIS: 0
MYALGIAS: 0
MEMORY LOSS: 0
TINGLING: 1
DIZZINESS: 0
WEAKNESS: 0
MUSCLE WEAKNESS: 0
LOSS OF CONSCIOUSNESS: 0
SPEECH CHANGE: 0
NUMBNESS: 1

## 2020-09-09 NOTE — TELEPHONE ENCOUNTER
Camacho is calling to ask if he can get flu shot today since he will be at the [facility] for an Imaging Appt. I recommended he check with the Primary Care Clinic since his PCP is a provider in the [facility]. I also sent a message to the Primary Care Clinic to ask if this is an option for him today.     If Camacho is unable to get today at the [facility] he will go to a local [facility] or [facility] and notify me.

## 2020-09-14 ENCOUNTER — VIRTUAL VISIT (OUTPATIENT)
Dept: ENDOCRINOLOGY | Facility: CLINIC | Age: 55
End: 2020-09-14
Payer: COMMERCIAL

## 2020-09-14 DIAGNOSIS — E11.42 TYPE 2 DIABETES MELLITUS WITH DIABETIC POLYNEUROPATHY, WITH LONG-TERM CURRENT USE OF INSULIN (H): Primary | ICD-10-CM

## 2020-09-14 DIAGNOSIS — Z79.4 TYPE 2 DIABETES MELLITUS WITH DIABETIC POLYNEUROPATHY, WITH LONG-TERM CURRENT USE OF INSULIN (H): Primary | ICD-10-CM

## 2020-09-14 NOTE — PROGRESS NOTES
Due to the COVID 19 pandemic this visit was converted to a telephone visit in order to help prevent spread of infection in this patient and the general population.     Phone call start time: 11:05 am   Phone call end time: 11:30 am    [facility]  Date: 09/14/2020    SUBJECTIVE:  Camacho Bhagat is a 54 year old male with PMHx of CASTAÑEDA/HCC s/p liver transplant (3/2017), fibromyalgia, DVT, HTN, RONNIE, OA, and CVA who presents for follow-up of his DM-II.     Type 2 DM was diagnosed in 2002.  Oral meds first, later placed on insulin for the last 7 years.      Related history: h/o CASTAÑEDA cirrhosis +/- work exposures, and HCC s/p liver transplant 3/2017. Course complicated by acute abdomen/neutropenic fever requiring right hemicolectomy and colostomy Fall 2017. He later underwent colostomy takedown 1/5/18 which was complicated by abdominal wall abscess at a drain site. During this course, he has also developed excessive proteinuria which is followed by nephrology.      Current DM Regimen:  55 units of Lantus in the morning, at 10 am   1 unit NovoLog per 3 g carbohydrates for all meals and snacks.  Correction dose of 1 unit per 25 mg above 150 during the day and 200 at bedtime. He has been only doing it for  BG> 200 when he is not going to eat, mainly bedtime.     Unfortunately, he was not able to download his glucometer.  He is concerned as his morning blood sugar has been consistently elevated, averaging 220.  His predinner blood sugar is frequently low normal, in the 70-80 range.  As per patient, his bedtime blood sugar is consistently lower compared with his morning blood sugar and he does not have a bedtime snack.  Denies experiencing hypoglycemic episodes.    Related meds:  Prednisone started for transplant and the dose remains 2.5 mg per day.  He continues to take tacrolimus and myfortic acid. He stopped his losartan in conjunction w/ his renal NP due to hyperkalemia. BP at home have been  130s-150s/70-80s.      Diet: 2-3 meals per day  Breakfast latest 9-11 am: varies; eats egg whites and turkey billy/sausage, or english muffins (26 grams), potatoes. He takes ~36 U for the 45-50 grams CHO.   Lunch: skips   Dinner 6-7:00 pm: often cooks at home; grilled chicken, fish, beef, lean pork, veggies and some potatoes, pasta or rice. He administers 30-34 U uinsulin for dinner.  Snacks: rarely; sometimes beef jerky, carrot, celery humus, crackers , fruit.  Alcohol or sugared beverages: no alcohol, no sugary drinks, mostly water     Exercise   Walks dog TID, around 1 mile/walk  Swims 2-3 times a week     Complications  + chronic complications.      1. Retinopathy: No known eye disease  Last eye exam was 11/12/19.  H/o DR. He has cataracts.       2.  Nephropathy: yes. CKD IIIa . He has significant proteinuria and f/up with nephrology.  Recent GFR in the 50s. He was started on losartan on 4/4/18 and the dose was increased to 50 mg daily. Discontinued losartan around 3/2019 due to hyperkalemia.     3. Neuropathy  Left foot tingling, mild. No ulcers or infection. No amputation.  He was evaluated by neurology and treatment with lipoic acid was recommended.  He reports some improvement.     4. Lipids: not on statin . last LDL in 30s    5. HTN: follows with nephrology on amlodipine and metoprolol both at max. BP today elevated.     6. Smoking: No    Review Of Systems  Answers for HPI/ROS submitted by the patient on 9/9/2020   General Symptoms: No  Skin Symptoms: No  HENT Symptoms: No  EYE SYMPTOMS: No  HEART SYMPTOMS: No  LUNG SYMPTOMS: No  INTESTINAL SYMPTOMS: No  URINARY SYMPTOMS: No  REPRODUCTIVE SYMPTOMS: No  SKELETAL SYMPTOMS: Yes  BLOOD SYMPTOMS: No  NERVOUS SYSTEM SYMPTOMS: Yes  MENTAL HEALTH SYMPTOMS: No  Back pain: Yes  Muscle aches: No  Neck pain: Yes  Swollen joints: No  Joint pain: Yes  Bone pain: No  Muscle cramps: Yes  Muscle weakness: No  Joint stiffness: Yes  Bone fracture: No  Trouble with  coordination: No  Dizziness or trouble with balance: No  Fainting or black-out spells: No  Memory loss: No  Headache: Yes  Seizures: No  Speech problems: No  Tingling: Yes  Tremor: No  Weakness: No  Difficulty walking: Yes  Paralysis: No  Numbness: Yes      Past Medical History  Past Medical History:   Diagnosis Date     Depressive disorder, not elsewhere classified      Diabetes type 2, controlled (H) 11/10/2016     Esophageal reflux      Fibromyalgia 1/2009    dx with Dr Benitez( Rheum)     Gangrene of finger (H) 8/25/2017     H/O deep venous thrombosis 11/2001    Permanent IVC filter in place.     H/O CASTAÑEDA (nonalcoholic steatohepatitis)      H/O Pneumonia, organism unspecified(486) 10/2001    Included ARDS, sepsis, and  acute renal failure; hospitalized, required tracheostomy placement.     H/O: HTN (hypertension) 11/2001    No longer prescribed antihypertensive medication.       History of CVA (cerebrovascular accident)      History of hepatocellular carcinoma      History of liver transplant (H)      History of obstructive sleep apnea     No longer wears CPAP since losing approximately 200 pounds with his liver transplant and its complications.       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     Osteoarthritis      Presence of PERMANENT IVC filter      Rheumatoid arthritis(714.0)        Past Surgical History  Past Surgical History:   Procedure Laterality Date     BENCH LIVER N/A 3/4/2017    Procedure: BENCH LIVER;  Surgeon: Jovan Tran MD;  Location: [facility] TOTAL KNEE ARTHROPLASTY  2008    Right knee arthroscopy     CHOLECYSTECTOMY       COLONOSCOPY N/A 7/21/2017    Procedure: COMBINED COLONOSCOPY, SINGLE OR MULTIPLE BIOPSY/POLYPECTOMY BY BIOPSY;  Colonoscopy;  Surgeon: Izaiah Montes MD;  Location:  GI     ENDOSCOPIC  RETROGRADE CHOLANGIOPANCREATOGRAM N/A 5/22/2018    Procedure: COMBINED ENDOSCOPIC RETROGRADE CHOLANGIOPANCREATOGRAPHY, PLACE TUBE/STENT;  Endoscopic Retrograde Cholangiopancreatography with sphincterotomy and pancreatic duct stent placement;  Surgeon: Zay Gaitan MD;  Location: [facility] OR     ENT SURGERY      Repair of deviated septum     ESOPHAGOSCOPY, GASTROSCOPY, DUODENOSCOPY (EGD), COMBINED N/A 8/4/2016    Procedure: COMBINED ESOPHAGOSCOPY, GASTROSCOPY, DUODENOSCOPY (EGD), BIOPSY SINGLE OR MULTIPLE;  Surgeon: Trent Pederson MD;  Location:  GI     ESOPHAGOSCOPY, GASTROSCOPY, DUODENOSCOPY (EGD), COMBINED N/A 8/27/2017    Procedure: COMBINED ESOPHAGOSCOPY, GASTROSCOPY, DUODENOSCOPY (EGD);;  Surgeon: Los Wynn MD;  Location:  GI     INCISION AND DRAINAGE ABDOMEN WASHOUT, COMBINED Right 2/14/2018    Procedure: COMBINED INCISION AND DRAINAGE ABDOMEN WASHOUT;  COMBINED INCISION AND DRAINAGE right ABDOMEN flank;  Surgeon: Sara Dinh MD;  Location: [facility] OR     LAPAROTOMY EXPLORATORY N/A 7/4/2017    Procedure: LAPAROTOMY EXPLORATORY;  Exploratory Laparotomy, washout;  Surgeon: Tip Zhang MD;  Location: [facility]     LAPAROTOMY EXPLORATORY N/A 7/5/2017    Procedure: LAPAROTOMY EXPLORATORY;  Exploratory Laparotomy, Washout with closure.;  Surgeon: Tip Zhang MD;  Location: [facility]     LAPAROTOMY EXPLORATORY N/A 7/26/2017    Procedure: LAPAROTOMY EXPLORATORY;  Exploratory Laparotomy, Right angelique-colectomy, end ileostomy, mucosal fistula, partial omentectomy;  Surgeon: Sara Dinh MD;  Location: [facility] OR     ORTHOPEDIC SURGERY Right     Repair of dislocated wrist.       RELEASE TRIGGER FINGER Right 11/10/2017    Procedure: RELEASE TRIGGER FINGER;  Debride, V-Y Flap Right Index Finger;  Surgeon: Momo Noonan MD;  [facility]: [facility]     TAKEDOWN ILEOSTOMY N/A 1/5/2018    Procedure: TAKEDOWN ILEOSTOMY;  Exploratory Laparotomy, Lysis of Adhesions, Takedown  Of End Ileostomy, Takedown of mucocutaneous fistula, ileocolic resection  And Colorectal Anastomosis, Primary repair of Ventral Hernia, Anesthesia Block ;  Surgeon: Sara Dinh MD;  Location: [facility] OR     TRACHEOSTOMY  2001    During hospitalization for pneumonia.       TRANSPLANT LIVER RECIPIENT  DONOR N/A 3/4/2017    Procedure: TRANSPLANT LIVER RECIPIENT  DONOR;  Surgeon: Jovan Tran MD;  Location: [facility] OR        Medications    Current Outpatient Medications:      alcohol swab prep pads, Use to swab area of injection/francisca as directed., Disp: 100 each, Rfl: 3     amLODIPine (NORVASC) 10 MG tablet, Take 1 tablet (10 mg) by mouth daily, Disp: 90 tablet, Rfl: 3     blood glucose (NO BRAND SPECIFIED) test strip, Use to test blood sugar 3 times daily or as directed. Any covered brand preferred by Pt that works with meter., Disp: 300 strip, Rfl: 3     blood glucose calibration (NO BRAND SPECIFIED) solution, Use to calibrate meter., Disp: 1 Bottle, Rfl: 3     blood glucose monitoring (NO BRAND SPECIFIED) meter device kit, Use to test blood sugar 3 times daily or as directed. Any covered brand preferred by Pt., Disp: 1 kit, Rfl: 1     cholecalciferol (VITAMIN D3) 1000 UNIT tablet, Take 1 tablet (1,000 Units) by mouth daily (Patient taking differently: Take 1,000 Units by mouth every morning ), Disp: 100 tablet, Rfl: 3     CIALIS 5 MG tablet, Take 5 mg by mouth as needed , Disp: , Rfl: 1     cyclobenzaprine (FLEXERIL) 10 MG tablet, Take 1 tablet (10 mg) by mouth 3 times daily as needed for muscle spasms, Disp: 90 tablet, Rfl: 3     fluticasone (FLONASE) 50 MCG/ACT nasal spray, Spray 1 spray into both nostrils daily, Disp: 15 mL, Rfl: 1     furosemide (LASIX) 40 MG tablet, Take 1 tablet (40 mg) by mouth 2 times daily, Disp: 180 tablet, Rfl: 3     gabapentin (NEURONTIN) 300 MG capsule, Take 1 capsule (300 mg) by mouth 3 times daily, Disp: 270 capsule, Rfl: 3     Glucose Blood (BLOOD  GLUCOSE TEST STRIPS) STRP, 1 strip by Lancet route 3 times daily, Disp: 300 each, Rfl: 3     insulin glargine (LANTUS SOLOSTAR) 100 UNIT/ML pen, Inject 55 Units Subcutaneous every morning, Disp: 60 mL, Rfl: 3     Insulin Lispro (HUMALOG KWIKPEN) 200 UNIT/ML soln, Use one unit per 3 grams carbohydrates for all meals and snacks. Total daily dose up to 60 U., Disp: 60 mL, Rfl: 3     insulin pen needle (BD ENE U/F) 32G X 4 MM miscellaneous, Use 1 pen needle 4 times daily or as directed., Disp: 400 each, Rfl: 3     metoprolol succinate ER (TOPROL-XL) 200 MG 24 hr tablet, Take 1 tablet (200 mg) by mouth daily Take a total of 150 mg daily, Disp: 90 tablet, Rfl: 3     multivitamin, therapeutic with minerals (MULTI-VITAMIN) TABS tablet, Take 1 tablet by mouth every morning, Disp: , Rfl:      mycophenolic acid (GENERIC EQUIVALENT) 360 MG EC tablet, Take 2 tablets (720 mg) by mouth every 12 hours, Disp: 360 tablet, Rfl: 3     omeprazole (PRILOSEC) 20 MG DR capsule, Take 1 capsule (20 mg) by mouth daily, Disp: 90 capsule, Rfl: 3     oxyCODONE (ROXICODONE) 5 MG tablet, Take 1 tablet (5 mg) by mouth every 4 hours as needed for pain maximum 6 tablet(s) per day, Disp: 30 tablet, Rfl: 0     patiromer (VELTASSA) 8.4 g packet, Take 8.4 g by mouth daily, Disp: 90 each, Rfl: 3     predniSONE (DELTASONE) 2.5 MG tablet, Take 1 tablet (2.5 mg) by mouth daily, Disp: 90 tablet, Rfl: 3     tacrolimus (GENERIC EQUIVALENT) 1 MG capsule, Take 5 capsules (5 mg) by mouth every 12 hours, Disp: 900 capsule, Rfl: 3     thin (NO BRAND SPECIFIED) lancets, Use with lanceting device. Any covered brand., Disp: 300 each, Rfl: 3     ursodiol (ACTIGALL) 500 MG tablet, Take 1 tablet (500 mg) by mouth 2 times daily, Disp: 180 tablet, Rfl: 3    Family History   Problem Relation Age of Onset     Diabetes Father      Hypertension Father      Substance Abuse Father      Arthritis Mother      Thyroid Cancer Mother      Survivor!     Cervical Cancer Maternal  Grandmother      Cerebrovascular Disease Maternal Grandfather      No Known Problems Paternal Grandmother      Prostate Cancer Paternal Grandfather      Colon Cancer No family hx of      Hyperlipidemia No family hx of      Coronary Artery Disease No family hx of      Breast Cancer No family hx of        Social history:  .  He used to work as a nurse at [employer].  Former smoker for 2 years, 0.75 packs/day, quit in 1988.  He used to chew tobacco and he stopped this in 2015.  He has not been drinking any alcohol since the liver transplant.    OBJECTIVE:  There were no vitals taken for this visit.  There is no height or weight on file to calculate BMI.   Objective:  Psych: Alert and oriented times 3; coherent speech, normal   rate and volume, able to articulate logical thoughts, able   to abstract reason, no tangential thoughts, no hallucinations   or delusions. his affect is normal.    I reviewed prior lab results documented in epic.  Lab Results   Component Value Date    A1C 5.5 04/11/2018    A1C 5.1 01/06/2018    A1C  07/06/2017     Canceled, Test credited   Below Assay Range  NOTIFIED LEONARD ONEILL AT 0538 ON 7/6/17 BY CHRISSY      A1C 9.4 (H) 02/16/2017    A1C 6.2 (H) 12/14/2016    HEMOGLOBINA1 5.8 11/18/2019    HEMOGLOBINA1 6.1 (A) 11/19/2018       Hemoglobin   Date Value Ref Range Status   07/02/2020 12.3 (L) 13.3 - 17.7 g/dL Final     Hematocrit   Date Value Ref Range Status   07/02/2020 37.4 (L) 40.0 - 53.0 % Final     Cholesterol   Date Value Ref Range Status   05/08/2020 141 <200 mg/dL Final     Cholesterol/HDL Ratio   Date Value Ref Range Status   07/03/2012 3.8 0.0 - 5.0 Final     HDL Cholesterol   Date Value Ref Range Status   05/08/2020 33 (L) >39 mg/dL Final     LDL Cholesterol Calculated   Date Value Ref Range Status   05/08/2020 35 <100 mg/dL Final     Comment:     Desirable:       <100 mg/dl     VLDL-Cholesterol   Date Value Ref Range Status   07/03/2012 36 (H) 0 - 30 mg/dL Final     Triglycerides    Date Value Ref Range Status   05/08/2020 365 (H) <150 mg/dL Final     Comment:     Borderline high:  150-199 mg/dl  High:             200-499 mg/dl  Very high:       >499 mg/dl       Albumin Urine mg/L   Date Value Ref Range Status   10/27/2017 122 mg/L Final     TSH   Date Value Ref Range Status   04/02/2018 2.74 0.40 - 4.00 mU/L Final         Last Basic Metabolic Panel:    Sodium   Date Value Ref Range Status   07/20/2020 137 133 - 144 mmol/L Final     Potassium   Date Value Ref Range Status   07/20/2020 4.5 3.4 - 5.3 mmol/L Final     Chloride   Date Value Ref Range Status   07/20/2020 109 94 - 109 mmol/L Final     Calcium   Date Value Ref Range Status   07/20/2020 9.0 8.5 - 10.1 mg/dL Final     Carbon Dioxide   Date Value Ref Range Status   07/20/2020 22 20 - 32 mmol/L Final     Urea Nitrogen   Date Value Ref Range Status   07/20/2020 54 (H) 7 - 30 mg/dL Final     Creatinine   Date Value Ref Range Status   07/20/2020 1.47 (H) 0.66 - 1.25 mg/dL Final     GFR Estimate   Date Value Ref Range Status   07/20/2020 53 (L) >60 mL/min/[1.73_m2] Final     Comment:     Non  GFR Calc  Starting 12/18/2018, serum creatinine based estimated GFR (eGFR) will be   calculated using the Chronic Kidney Disease Epidemiology Collaboration   (CKD-EPI) equation.       Glucose   Date Value Ref Range Status   07/20/2020 147 (H) 70 - 99 mg/dL Final       AST   Date Value Ref Range Status   07/02/2020 21 0 - 45 U/L Final     ALT   Date Value Ref Range Status   07/02/2020 31 0 - 70 U/L Final     Albumin Urine mg/g Cr   Date Value Ref Range Status   10/27/2017 132.46 (H) 0 - 17 mg/g Cr Final       A/P   Camacho Bhagat is a 54 year old male who is here for a follow-up appointment.  His past medical history significant for fibromyalgia, DVT, nonalcoholic steatohepatosis and liver cancer/status post transplant, hypertension, obstructive sleep apnea, PAD, OA/RA and CVA.     Type 2 diabetes, diagnosed in 2002. Oral meds first,  later placed on insulin for the last 7 years.  His diabetes is known to be complicated by triopathy, PAD and CVA.    Per patient report, his morning blood sugar is consistently elevated compared with the bedtime blood sugar.  For now, discussed about the option of splitting the dose of Lantus and taking 50 units in the morning and 10 units at bedtime.  Long-term, I think he is going to benefit from the use of a CGM.  Counseled on the available options: Freestyle mikki and Dexcom G6.  In particular, the patient is concerned about their size and the fact that they might get easily dislodged given the fact that he plays fairly rough with his 5-year-old [place] dog and he also does some  work.  He also raises the question of infection at the insertion site and whether or not the adhesive is strong enough to hold the sensor while swimming or with increased sweating.     I think the overall risk of infection at the insertion site is fairly small.  Discussed about the option of using additional adhesive patches, in the event the sensor falls of.  The patient thinks his insurance are not going to provide full coverage for the sensor and, for now, he would prefer to try one, on a trial basis.  He would prefer an abdominal insertion.    Recommendations:  Check blood sugar before meals and at bedtime  Keep written records of the carbohydrate intake, insulin dose administered  Schedule an appointment with the diabetes educator to insert a Dexcom Pro sensor  For now, only split the dose of Lantus and administer 50 units in the morning and 10 units at bedtime.  Further insulin dose changes to be made based on the sensor readings.  Follow-up A1c.    Orders Placed This Encounter   Procedures     AMBULATORY ADULT DIABETES EDUCATOR REFERRAL

## 2020-09-14 NOTE — LETTER
9/14/2020       RE: Camacho Bhagat  [address]  [facility] [address]     Dear Colleague,    Thank you for referring your patient, Camacho Bhagat, to the [facility] ENDOCRINOLOGY at [facility]. Please see a copy of my visit note below.    Due to the COVID 19 pandemic this visit was converted to a telephone visit in order to help prevent spread of infection in this patient and the general population.     Phone call start time: 11:05 am   Phone call end time: 11:30 am    [facility]  Date: 09/14/2020    SUBJECTIVE:  Camacho Bhagat is a 54 year old male with PMHx of CASTAÑEDA/HCC s/p liver transplant (3/2017), fibromyalgia, DVT, HTN, RONNIE, OA, and CVA who presents for follow-up of his DM-II.     Type 2 DM was diagnosed in 2002.  Oral meds first, later placed on insulin for the last 7 years.      Related history: h/o CASTAÑEDA cirrhosis +/- work exposures, and HCC s/p liver transplant 3/2017. Course complicated by acute abdomen/neutropenic fever requiring right hemicolectomy and colostomy Fall 2017. He later underwent colostomy takedown 1/5/18 which was complicated by abdominal wall abscess at a drain site. During this course, he has also developed excessive proteinuria which is followed by nephrology.      Current DM Regimen:  55 units of Lantus in the morning, at 10 am   1 unit NovoLog per 3 g carbohydrates for all meals and snacks.  Correction dose of 1 unit per 25 mg above 150 during the day and 200 at bedtime. He has been only doing it for  BG> 200 when he is not going to eat, mainly bedtime.     Unfortunately, he was not able to download his glucometer.  He is concerned as his morning blood sugar has been consistently elevated, averaging 220.  His predinner blood sugar is frequently low normal, in the 70-80 range.  As per patient, his bedtime blood sugar is consistently lower compared with his morning blood sugar and he does not have a bedtime snack.   Denies experiencing hypoglycemic episodes.    Related meds:  Prednisone started for transplant and the dose remains 2.5 mg per day.  He continues to take tacrolimus and myfortic acid. He stopped his losartan in conjunction w/ his renal NP due to hyperkalemia. BP at home have been 130s-150s/70-80s.      Diet: 2-3 meals per day  Breakfast latest 9-11 am: varies; eats egg whites and turkey billy/sausage, or english muffins (26 grams), potatoes. He takes ~36 U for the 45-50 grams CHO.   Lunch: skips   Dinner 6-7:00 pm: often cooks at home; grilled chicken, fish, beef, lean pork, veggies and some potatoes, pasta or rice. He administers 30-34 U uinsulin for dinner.  Snacks: rarely; sometimes beef jerky, carrot, celery humus, crackers , fruit.  Alcohol or sugared beverages: no alcohol, no sugary drinks, mostly water     Exercise   Walks dog TID, around 1 mile/walk  Swims 2-3 times a week     Complications  + chronic complications.      1. Retinopathy: No known eye disease  Last eye exam was 11/12/19.  H/o DR. He has cataracts.       2.  Nephropathy: yes. CKD IIIa . He has significant proteinuria and f/up with nephrology.  Recent GFR in the 50s. He was started on losartan on 4/4/18 and the dose was increased to 50 mg daily. Discontinued losartan around 3/2019 due to hyperkalemia.     3. Neuropathy  Left foot tingling, mild. No ulcers or infection. No amputation.  He was evaluated by neurology and treatment with lipoic acid was recommended.  He reports some improvement.     4. Lipids: not on statin . last LDL in 30s    5. HTN: follows with nephrology on amlodipine and metoprolol both at max. BP today elevated.     6. Smoking: No    Review Of Systems  Answers for HPI/ROS submitted by the patient on 9/9/2020   General Symptoms: No  Skin Symptoms: No  HENT Symptoms: No  EYE SYMPTOMS: No  HEART SYMPTOMS: No  LUNG SYMPTOMS: No  INTESTINAL SYMPTOMS: No  URINARY SYMPTOMS: No  REPRODUCTIVE SYMPTOMS: No  SKELETAL SYMPTOMS: Yes  BLOOD  SYMPTOMS: No  NERVOUS SYSTEM SYMPTOMS: Yes  MENTAL HEALTH SYMPTOMS: No  Back pain: Yes  Muscle aches: No  Neck pain: Yes  Swollen joints: No  Joint pain: Yes  Bone pain: No  Muscle cramps: Yes  Muscle weakness: No  Joint stiffness: Yes  Bone fracture: No  Trouble with coordination: No  Dizziness or trouble with balance: No  Fainting or black-out spells: No  Memory loss: No  Headache: Yes  Seizures: No  Speech problems: No  Tingling: Yes  Tremor: No  Weakness: No  Difficulty walking: Yes  Paralysis: No  Numbness: Yes      Past Medical History  Past Medical History:   Diagnosis Date     Depressive disorder, not elsewhere classified      Diabetes type 2, controlled (H) 11/10/2016     Esophageal reflux      Fibromyalgia 1/2009    dx with Dr Benitez( Rheum)     Gangrene of finger (H) 8/25/2017     H/O deep venous thrombosis 11/2001    Permanent IVC filter in place.     H/O CASTAÑEDA (nonalcoholic steatohepatitis)      H/O Pneumonia, organism unspecified(486) 10/2001    Included ARDS, sepsis, and  acute renal failure; hospitalized, required tracheostomy placement.     H/O: HTN (hypertension) 11/2001    No longer prescribed antihypertensive medication.       History of CVA (cerebrovascular accident)      History of hepatocellular carcinoma      History of liver transplant (H)      History of obstructive sleep apnea     No longer wears CPAP since losing approximately 200 pounds with his liver transplant and its complications.       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     Osteoarthritis      Presence of PERMANENT IVC filter      Rheumatoid arthritis(714.0)        Past Surgical History  Past Surgical History:   Procedure Laterality Date     BENCH LIVER N/A 3/4/2017    Procedure: BENCH LIVER;  Surgeon: Jovan Tran MD;  Location:  OR      TOTAL  KNEE ARTHROPLASTY  2008    Right knee arthroscopy     CHOLECYSTECTOMY       COLONOSCOPY N/A 7/21/2017    Procedure: COMBINED COLONOSCOPY, SINGLE OR MULTIPLE BIOPSY/POLYPECTOMY BY BIOPSY;  Colonoscopy;  Surgeon: Izaiah Montes MD;  [facility]     ENDOSCOPIC RETROGRADE CHOLANGIOPANCREATOGRAM N/A 5/22/2018    Procedure: COMBINED ENDOSCOPIC RETROGRADE CHOLANGIOPANCREATOGRAPHY, PLACE TUBE/STENT;  Endoscopic Retrograde Cholangiopancreatography with sphincterotomy and pancreatic duct stent placement;  Surgeon: Zay Gaitan MD;  [facility]     ENT SURGERY      Repair of deviated septum     ESOPHAGOSCOPY, GASTROSCOPY, DUODENOSCOPY (EGD), COMBINED N/A 8/4/2016    Procedure: COMBINED ESOPHAGOSCOPY, GASTROSCOPY, DUODENOSCOPY (EGD), BIOPSY SINGLE OR MULTIPLE;  Surgeon: Trent Pederson MD;  Location:  GI     ESOPHAGOSCOPY, GASTROSCOPY, DUODENOSCOPY (EGD), COMBINED N/A 8/27/2017    Procedure: COMBINED ESOPHAGOSCOPY, GASTROSCOPY, DUODENOSCOPY (EGD);;  Surgeon: Los Wynn MD;  Location:  GI     INCISION AND DRAINAGE ABDOMEN WASHOUT, COMBINED Right 2/14/2018    Procedure: COMBINED INCISION AND DRAINAGE ABDOMEN WASHOUT;  COMBINED INCISION AND DRAINAGE right ABDOMEN flank;  Surgeon: Sara Dinh MD;  [facility]     LAPAROTOMY EXPLORATORY N/A 7/4/2017    Procedure: LAPAROTOMY EXPLORATORY;  Exploratory Laparotomy, washout;  Surgeon: Tip Zhang MD;  [facility]     LAPAROTOMY EXPLORATORY N/A 7/5/2017    Procedure: LAPAROTOMY EXPLORATORY;  Exploratory Laparotomy, Washout with closure.;  Surgeon: Tip Zhang MD;  [facility]     LAPAROTOMY EXPLORATORY N/A 7/26/2017    Procedure: LAPAROTOMY EXPLORATORY;  Exploratory Laparotomy, Right angelique-colectomy, end ileostomy, mucosal fistula, partial omentectomy;  Surgeon: Sara Dinh MD;  [facility]     ORTHOPEDIC SURGERY Right     Repair of dislocated wrist.       RELEASE TRIGGER FINGER  Right 11/10/2017    Procedure: RELEASE TRIGGER FINGER;  Debride, V-Y Flap Right Index Finger;  Surgeon: Momo Noonan MD;  [facility]     TAKEDOWN ILEOSTOMY N/A 2018    Procedure: TAKEDOWN ILEOSTOMY;  Exploratory Laparotomy, Lysis of Adhesions, Takedown Of End Ileostomy, Takedown of mucocutaneous fistula, ileocolic resection  And Colorectal Anastomosis, Primary repair of Ventral Hernia, Anesthesia Block ;  Surgeon: Sara Dinh MD;  [facility]     TRACHEOSTOMY  2001    During hospitalization for pneumonia.       TRANSPLANT LIVER RECIPIENT  DONOR N/A 3/4/2017    Procedure: TRANSPLANT LIVER RECIPIENT  DONOR;  Surgeon: Jovan Tran MD;  [facility]        Medications    Current Outpatient Medications:      alcohol swab prep pads, Use to swab area of injection/francisca as directed., Disp: 100 each, Rfl: 3     amLODIPine (NORVASC) 10 MG tablet, Take 1 tablet (10 mg) by mouth daily, Disp: 90 tablet, Rfl: 3     blood glucose (NO BRAND SPECIFIED) test strip, Use to test blood sugar 3 times daily or as directed. Any covered brand preferred by Pt that works with meter., Disp: 300 strip, Rfl: 3     blood glucose calibration (NO BRAND SPECIFIED) solution, Use to calibrate meter., Disp: 1 Bottle, Rfl: 3     blood glucose monitoring (NO BRAND SPECIFIED) meter device kit, Use to test blood sugar 3 times daily or as directed. Any covered brand preferred by Pt., Disp: 1 kit, Rfl: 1     cholecalciferol (VITAMIN D3) 1000 UNIT tablet, Take 1 tablet (1,000 Units) by mouth daily (Patient taking differently: Take 1,000 Units by mouth every morning ), Disp: 100 tablet, Rfl: 3     CIALIS 5 MG tablet, Take 5 mg by mouth as needed , Disp: , Rfl: 1     cyclobenzaprine (FLEXERIL) 10 MG tablet, Take 1 tablet (10 mg) by mouth 3 times daily as needed for muscle spasms, Disp: 90 tablet, Rfl: 3     fluticasone (FLONASE) 50 MCG/ACT nasal spray, Spray 1 spray into both nostrils daily,  Disp: 15 mL, Rfl: 1     furosemide (LASIX) 40 MG tablet, Take 1 tablet (40 mg) by mouth 2 times daily, Disp: 180 tablet, Rfl: 3     gabapentin (NEURONTIN) 300 MG capsule, Take 1 capsule (300 mg) by mouth 3 times daily, Disp: 270 capsule, Rfl: 3     Glucose Blood (BLOOD GLUCOSE TEST STRIPS) STRP, 1 strip by Lancet route 3 times daily, Disp: 300 each, Rfl: 3     insulin glargine (LANTUS SOLOSTAR) 100 UNIT/ML pen, Inject 55 Units Subcutaneous every morning, Disp: 60 mL, Rfl: 3     Insulin Lispro (HUMALOG KWIKPEN) 200 UNIT/ML soln, Use one unit per 3 grams carbohydrates for all meals and snacks. Total daily dose up to 60 U., Disp: 60 mL, Rfl: 3     insulin pen needle (BD ENE U/F) 32G X 4 MM miscellaneous, Use 1 pen needle 4 times daily or as directed., Disp: 400 each, Rfl: 3     metoprolol succinate ER (TOPROL-XL) 200 MG 24 hr tablet, Take 1 tablet (200 mg) by mouth daily Take a total of 150 mg daily, Disp: 90 tablet, Rfl: 3     multivitamin, therapeutic with minerals (MULTI-VITAMIN) TABS tablet, Take 1 tablet by mouth every morning, Disp: , Rfl:      mycophenolic acid (GENERIC EQUIVALENT) 360 MG EC tablet, Take 2 tablets (720 mg) by mouth every 12 hours, Disp: 360 tablet, Rfl: 3     omeprazole (PRILOSEC) 20 MG DR capsule, Take 1 capsule (20 mg) by mouth daily, Disp: 90 capsule, Rfl: 3     oxyCODONE (ROXICODONE) 5 MG tablet, Take 1 tablet (5 mg) by mouth every 4 hours as needed for pain maximum 6 tablet(s) per day, Disp: 30 tablet, Rfl: 0     patiromer (VELTASSA) 8.4 g packet, Take 8.4 g by mouth daily, Disp: 90 each, Rfl: 3     predniSONE (DELTASONE) 2.5 MG tablet, Take 1 tablet (2.5 mg) by mouth daily, Disp: 90 tablet, Rfl: 3     tacrolimus (GENERIC EQUIVALENT) 1 MG capsule, Take 5 capsules (5 mg) by mouth every 12 hours, Disp: 900 capsule, Rfl: 3     thin (NO BRAND SPECIFIED) lancets, Use with lanceting device. Any covered brand., Disp: 300 each, Rfl: 3     ursodiol (ACTIGALL) 500 MG tablet, Take 1 tablet (500 mg)  by mouth 2 times daily, Disp: 180 tablet, Rfl: 3    Family History   Problem Relation Age of Onset     Diabetes Father      Hypertension Father      Substance Abuse Father      Arthritis Mother      Thyroid Cancer Mother      Survivor!     Cervical Cancer Maternal Grandmother      Cerebrovascular Disease Maternal Grandfather      No Known Problems Paternal Grandmother      Prostate Cancer Paternal Grandfather      Colon Cancer No family hx of      Hyperlipidemia No family hx of      Coronary Artery Disease No family hx of      Breast Cancer No family hx of        Social history:  .  He used to work as a nurse at [employer].  Former smoker for 2 years, 0.75 packs/day, quit in 1988.  He used to chew tobacco and he stopped this in 2015.  He has not been drinking any alcohol since the liver transplant.    OBJECTIVE:  There were no vitals taken for this visit.  There is no height or weight on file to calculate BMI.   Objective:  Psych: Alert and oriented times 3; coherent speech, normal   rate and volume, able to articulate logical thoughts, able   to abstract reason, no tangential thoughts, no hallucinations   or delusions. his affect is normal.    I reviewed prior lab results documented in epic.  Lab Results   Component Value Date    A1C 5.5 04/11/2018    A1C 5.1 01/06/2018    A1C  07/06/2017     Canceled, Test credited   Below Assay Range  NOTIFIED LEONARD ONEILL AT 0538 ON 7/6/17 BY CHRISSY      A1C 9.4 (H) 02/16/2017    A1C 6.2 (H) 12/14/2016    HEMOGLOBINA1 5.8 11/18/2019    HEMOGLOBINA1 6.1 (A) 11/19/2018       Hemoglobin   Date Value Ref Range Status   07/02/2020 12.3 (L) 13.3 - 17.7 g/dL Final     Hematocrit   Date Value Ref Range Status   07/02/2020 37.4 (L) 40.0 - 53.0 % Final     Cholesterol   Date Value Ref Range Status   05/08/2020 141 <200 mg/dL Final     Cholesterol/HDL Ratio   Date Value Ref Range Status   07/03/2012 3.8 0.0 - 5.0 Final     HDL Cholesterol   Date Value Ref Range Status   05/08/2020 33  (L) >39 mg/dL Final     LDL Cholesterol Calculated   Date Value Ref Range Status   05/08/2020 35 <100 mg/dL Final     Comment:     Desirable:       <100 mg/dl     VLDL-Cholesterol   Date Value Ref Range Status   07/03/2012 36 (H) 0 - 30 mg/dL Final     Triglycerides   Date Value Ref Range Status   05/08/2020 365 (H) <150 mg/dL Final     Comment:     Borderline high:  150-199 mg/dl  High:             200-499 mg/dl  Very high:       >499 mg/dl       Albumin Urine mg/L   Date Value Ref Range Status   10/27/2017 122 mg/L Final     TSH   Date Value Ref Range Status   04/02/2018 2.74 0.40 - 4.00 mU/L Final         Last Basic Metabolic Panel:    Sodium   Date Value Ref Range Status   07/20/2020 137 133 - 144 mmol/L Final     Potassium   Date Value Ref Range Status   07/20/2020 4.5 3.4 - 5.3 mmol/L Final     Chloride   Date Value Ref Range Status   07/20/2020 109 94 - 109 mmol/L Final     Calcium   Date Value Ref Range Status   07/20/2020 9.0 8.5 - 10.1 mg/dL Final     Carbon Dioxide   Date Value Ref Range Status   07/20/2020 22 20 - 32 mmol/L Final     Urea Nitrogen   Date Value Ref Range Status   07/20/2020 54 (H) 7 - 30 mg/dL Final     Creatinine   Date Value Ref Range Status   07/20/2020 1.47 (H) 0.66 - 1.25 mg/dL Final     GFR Estimate   Date Value Ref Range Status   07/20/2020 53 (L) >60 mL/min/[1.73_m2] Final     Comment:     Non  GFR Calc  Starting 12/18/2018, serum creatinine based estimated GFR (eGFR) will be   calculated using the Chronic Kidney Disease Epidemiology Collaboration   (CKD-EPI) equation.       Glucose   Date Value Ref Range Status   07/20/2020 147 (H) 70 - 99 mg/dL Final       AST   Date Value Ref Range Status   07/02/2020 21 0 - 45 U/L Final     ALT   Date Value Ref Range Status   07/02/2020 31 0 - 70 U/L Final     Albumin Urine mg/g Cr   Date Value Ref Range Status   10/27/2017 132.46 (H) 0 - 17 mg/g Cr Final       A/P   Camacho Bhagat is a 54 year old male who is here for a  follow-up appointment.  His past medical history significant for fibromyalgia, DVT, nonalcoholic steatohepatosis and liver cancer/status post transplant, hypertension, obstructive sleep apnea, PAD, OA/RA and CVA.     Type 2 diabetes, diagnosed in 2002. Oral meds first, later placed on insulin for the last 7 years.  His diabetes is known to be complicated by triopathy, PAD and CVA.    Per patient report, his morning blood sugar is consistently elevated compared with the bedtime blood sugar.  For now, discussed about the option of splitting the dose of Lantus and taking 50 units in the morning and 10 units at bedtime.  Long-term, I think he is going to benefit from the use of a CGM.  Counseled on the available options: Freestyle mikki and Dexcom G6.  In particular, the patient is concerned about their size and the fact that they might get easily dislodged given the fact that he plays fairly rough with his 5-year-old [place] dog and he also does some  work.  He also raises the question of infection at the insertion site and whether or not the adhesive is strong enough to hold the sensor while swimming or with increased sweating.     I think the overall risk of infection at the insertion site is fairly small.  Discussed about the option of using additional adhesive patches, in the event the sensor falls of.  The patient thinks his insurance are not going to provide full coverage for the sensor and, for now, he would prefer to try one, on a trial basis.  He would prefer an abdominal insertion.    Recommendations:  Check blood sugar before meals and at bedtime  Keep written records of the carbohydrate intake, insulin dose administered  Schedule an appointment with the diabetes educator to insert a Dexcom Pro sensor  For now, only split the dose of Lantus and administer 50 units in the morning and 10 units at bedtime.  Further insulin dose changes to be made based on the sensor readings.  Follow-up  A1c.    Orders Placed This Encounter   Procedures     AMBULATORY ADULT DIABETES EDUCATOR REFERRAL       Again, thank you for allowing me to participate in the care of your patient.      Sincerely,    Alisa West MD

## 2020-09-15 ENCOUNTER — TELEPHONE (OUTPATIENT)
Dept: TRANSPLANT | Facility: CLINIC | Age: 55
End: 2020-09-15

## 2020-09-15 DIAGNOSIS — M25.551 BILATERAL HIP PAIN: Primary | ICD-10-CM

## 2020-09-15 DIAGNOSIS — M25.552 BILATERAL HIP PAIN: Primary | ICD-10-CM

## 2020-09-15 NOTE — TELEPHONE ENCOUNTER
Dr. Camejo reviewed MRI of bilateral hips done 9/9/2020  and has ordered ortho consult. Order placed and schedule request sent.     Attempted to reach Camacho by phone, no answer, left a message to call me and main office number provided. FOBO message sent as well.     *UPDATE*  Camacho returned my call and was notified that Dr. Camejo has ordered an ortho consult which was placed and scheduled request sent. Camacho verbalized understanding.     Camacho received a flu shot at [facility] yesterday 9/14/2020. Will document in our records.

## 2020-09-15 NOTE — TELEPHONE ENCOUNTER
Patient Call: General      Reason for call: Patient returning coordinator's call    Call back needed? Yes    Return Call Needed  Same as documented in contacts section  When to return call?: Same day: Route High Priority

## 2020-09-17 DIAGNOSIS — Z94.4 HISTORY OF LIVER TRANSPLANT (H): ICD-10-CM

## 2020-09-17 DIAGNOSIS — M25.551 BILATERAL HIP PAIN: Primary | ICD-10-CM

## 2020-09-17 DIAGNOSIS — M25.552 BILATERAL HIP PAIN: Primary | ICD-10-CM

## 2020-09-23 DIAGNOSIS — Z94.4 LIVER REPLACED BY TRANSPLANT (H): ICD-10-CM

## 2020-09-23 DIAGNOSIS — Z13.220 LIPID SCREENING: ICD-10-CM

## 2020-09-29 ENCOUNTER — OFFICE VISIT (OUTPATIENT)
Dept: ORTHOPEDICS | Facility: CLINIC | Age: 55
End: 2020-09-29
Payer: COMMERCIAL

## 2020-09-29 ENCOUNTER — ANCILLARY PROCEDURE (OUTPATIENT)
Dept: GENERAL RADIOLOGY | Facility: CLINIC | Age: 55
End: 2020-09-29
Attending: STUDENT IN AN ORGANIZED HEALTH CARE EDUCATION/TRAINING PROGRAM
Payer: COMMERCIAL

## 2020-09-29 VITALS
DIASTOLIC BLOOD PRESSURE: 90 MMHG | WEIGHT: 248 LBS | BODY MASS INDEX: 33.59 KG/M2 | HEIGHT: 72 IN | SYSTOLIC BLOOD PRESSURE: 146 MMHG

## 2020-09-29 DIAGNOSIS — Z94.4 HISTORY OF LIVER TRANSPLANT (H): ICD-10-CM

## 2020-09-29 DIAGNOSIS — M25.551 BILATERAL HIP PAIN: ICD-10-CM

## 2020-09-29 DIAGNOSIS — M25.552 BILATERAL HIP PAIN: ICD-10-CM

## 2020-09-29 PROCEDURE — 99213 OFFICE O/P EST LOW 20 MIN: CPT | Performed by: STUDENT IN AN ORGANIZED HEALTH CARE EDUCATION/TRAINING PROGRAM

## 2020-09-29 PROCEDURE — 73522 X-RAY EXAM HIPS BI 3-4 VIEWS: CPT

## 2020-09-29 RX ORDER — PERPHENAZINE 16 MG
600 TABLET ORAL
COMMUNITY

## 2020-09-29 ASSESSMENT — MIFFLIN-ST. JEOR: SCORE: 1997.92

## 2020-09-29 ASSESSMENT — ACTIVITIES OF DAILY LIVING (ADL)
ADL_COUNT: 17
ADL_SUBSCALE_SCORE: 64.75
ADL_MEAN: 1.41
ADL_SUM: 24

## 2020-09-29 ASSESSMENT — HOOS S4: HOW SEVERE IS YOUR HIP JOINT STIFFNESS AFTER FIRST WAKENING IN THE MORNING?: MILD

## 2020-09-29 NOTE — LETTER
9/29/2020         RE: Camacho Bhagat  [address] [facility] [address]        Dear Colleague,    Thank you for referring your patient, Camacho Bhagat, to the [facility] ORTHOPEDIC SURGERY. Please see a copy of my visit note below.        [facility]  Orthopaedic Surgery Consultation by Isidro Mcleod M.D.    Camacho Bhagat MRN# [id number]   Age: 54 year old YOB: 1965     Requesting physician: No ref. provider found  [facility]     Background history:  DX:  1. 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 Hepatocellular carcinoma  3. Status post liver transplant for which on 2.5 mg prednisone, tacrolimus and mycophenolate acid  4. CMV colitis for which right angelique-colectomy  5. Hypertension  6. Rheumatoid arthritis    TREATMENTS:  1. 12/17/2007 right knee arthroscopy, Dr. JOLANTA Restrepo  2. 3/4/2017, liver transplant, Dr. Tran  3. 7/26/2017, right hemicolectomy, Dr. Dinh           History of Present Illness:   54 year old male who presents to our clinic because of bilateral hip pain.  Patient describes the pain being present for approximately 2 years.  No antecedent trauma.  The pain is present on the lateral sides of his hip in the greater trochanteric region however, there is no tenderness to palpation locally.  Patient denies any night pain or initiation stiffness and soreness.  He describes some numbness and tingling of his feet.  He denies the presence of any gross motor deficits.  Patient can currently ambulate for approximately 100 yards which is limiting him significantly in his daily activities.  He does not ambulate with the help of any assistive devices.  Patient endorses lower back pain.  Patient lives independently and alone.  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  Patient is on immunosuppressant because of his liver transplant.     Current symptoms:  Problem: bilateral hip  pain  Onset and duration: 1-2 years  Awakens from sleep due to sx's:  No  Precipitating Injury:  Patient reports onset of bilateral hip pain near the time of his liver transplant, with continued pain with activies thereafter.     Other joints or sites painful:  Yes, multi joint pain due to osteoarthritis, and rheumatoid arthritis. Pending weather and activities his joint pain will vary. Bilateral diabetic neuropathy of feet.     Social:   Occupation: not currently working  Living situation: lives alone with dog   Hobbies / Sports: watch baseball, football, working on cars, hunting, fishing, yardwork    Smoking: No  Alcohol: No  Illicit drug use: No         Physical Exam:     EXAMINATION pertinent findings:   PSYCH: Pleasant, healthy-appearing, alert, oriented x3, cooperative. Normal mood and affect.  VITAL SIGNS: Blood pressure (!) 146/90, height 1.829 m (6'), weight 112.5 kg (248 lb)..  Reviewed nursing intake notes.   Body mass index is 33.63 kg/m .  RESP: non labored breathing   ABD: benign, soft, non-tender, no acute peritoneal findings  SKIN: grossly normal   LYMPHATIC: grossly normal, no adenopathy, no extremity edema  NEURO: grossly normal , no motor deficits  VASCULAR: satisfactory perfusion of all extremities   MUSCULOSKELETAL:   Alignment: Slight valgus alignment of left lower extremity well the right seems neutral.  Gait: Careful but nonantalgic gait.  Lasegue's test negative  Hips:   L hip: ROM    , Extension 100  , IRF 20  , ERF 40  , ABD 40  , ADD 20     R hip: ROM    , Extension 100  , IRF 20  , ERF 40  , ABD 40  , ADD 20    There is some tenderness to palpation over the greater trochanteric regions bilaterally.  However, patient states that this is not the pain that he is experiencing daily.  Resistance testing of the abductors does not elicit any pain.    Bilateral LE:   Thigh and leg compartments soft and compressible   +Quad/TA/GSC/FHL/EHL   SILT DP/SP/Maya/Saph/Tib nerve distributions.   Some numbness and tingling on the lateral edge of the bilateral feet.   Palpable dorsalis pedis pulse              Data:   All laboratory data reviewed  All imaging studies reviewed by me personally    MRI left hip 9/9/2020:   1. No acute osseous abnormality.     2. Mild osteoarthrosis of the left hip.     3. Tendinosis of the gluteus medius with partial-thickness tearing of  the anterior fibers at the footprint.    Most notably also no signs of osteonecrosis of the femoral head.    MRI right hip 9/9/2020:  Impression:  1. No acute osseous abnormality.     2. At least mild osteoarthrosis of the right hip.     3. Tendinosis of the gluteus minimus and gluteus medius minimus with  partial tearing of the gluteus minimus.    Most notably also no signs of osteonecrosis of the femoral head.           Assessment and Plan:   Assessment:  54-year-old male with a specific pain of bilateral hips.  Initial diagnoses include osteoarthritis, osteonecrosis, lumbar spine pathology or bursitis of greater trochanter or piriformis.     Plan:  Extensively discussed my findings with the patient.  Although it seems reasonable that his pain is caused by his hip osteoarthritis I am not 100% certain of this.  Therefore, I think it is in his best interest to further evaluate the origin of his pain by means of an intra-articular injection of bilateral hips with a combination of lidocaine and cortisone.  I advised patient that after the injection she should do the things that usually hurt his hips to see if the lidocaine portion would alleviate his symptoms.  I also discussed with him to write these changes down so we could evaluate them 2 weeks after injection via virtual clinic.  She understands and agrees to the treatment plan as set forth.  All questions were answered to the best of my abilities.    Thank you for your referral.    Isidro Mcleod MD     [facility] Department of  Orthopaedic Surgery  Pager [phone]      DATA for DOCUMENTATION:         Past Medical History:     Patient Active Problem List   Diagnosis     Carpal tunnel syndrome     Testicular hypofunction     Fibromyalgia     Sicca syndrome (H)     Medication refill- do not delete      Hepatocellular carcinoma (H)     Osteoarthritis     Rheumatoid arthritis (H)     Hyperkalemia     Liver transplant recipient (H)     Immunosuppressed status (H)     Neutropenic colitis (H)     S/P liver transplant (H)     Pancytopenia (H)     Gangrene of finger (H)     Ischemia of both lower extremities     Elevated serum creatinine     History of creation of ostomy (H)     Peristomal skin complication     Painful periwound skin     Encounter for attention to ileostomy (H)     Ileostomy in place (H)     Abscess, intra-abdominal, postoperative     Liver transplant rejection (H)     CMV colitis (H)     Type 2 diabetes mellitus with diabetic polyneuropathy, with long-term current use of insulin (H)     Diarrhea of infectious origin (Plesiomonas shigelloides (formerly known Aeromonas shigelloides) in 3/14/2019 sample)     Hypertension secondary to other renal disorders     Past Medical History:   Diagnosis Date     Depressive disorder, not elsewhere classified      Diabetes type 2, controlled (H) 11/10/2016     Esophageal reflux      Fibromyalgia 1/2009    dx with Dr Benitez( Rheum)     Gangrene of finger (H) 8/25/2017     H/O deep venous thrombosis 11/2001    Permanent IVC filter in place.     H/O CASTAÑEDA (nonalcoholic steatohepatitis)      H/O Pneumonia, organism unspecified(486) 10/2001    Included ARDS, sepsis, and  acute renal failure; hospitalized, required tracheostomy placement.     H/O: HTN (hypertension) 11/2001    No longer prescribed antihypertensive medication.       History of CVA (cerebrovascular accident)      History of hepatocellular carcinoma      History of liver transplant (H)      History of obstructive sleep apnea     No  longer wears CPAP since losing approximately 200 pounds with his liver transplant and its complications.       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     Osteoarthritis      Presence of PERMANENT IVC filter      Rheumatoid arthritis(714.0)        Also see scanned health assessment forms.       Past Surgical History:     Past Surgical History:   Procedure Laterality Date     BENCH LIVER N/A 3/4/2017    Procedure: BENCH LIVER;  Surgeon: Jovan Tran MD;  Location: [facility] TOTAL KNEE ARTHROPLASTY  2008    Right knee arthroscopy     CHOLECYSTECTOMY       COLONOSCOPY N/A 7/21/2017    Procedure: COMBINED COLONOSCOPY, SINGLE OR MULTIPLE BIOPSY/POLYPECTOMY BY BIOPSY;  Colonoscopy;  Surgeon: Izaiah Montes MD;  Location:  GI     ENDOSCOPIC RETROGRADE CHOLANGIOPANCREATOGRAM N/A 5/22/2018    Procedure: COMBINED ENDOSCOPIC RETROGRADE CHOLANGIOPANCREATOGRAPHY, PLACE TUBE/STENT;  Endoscopic Retrograde Cholangiopancreatography with sphincterotomy and pancreatic duct stent placement;  Surgeon: aZy Gaitan MD;  Location:  OR     ENT SURGERY      Repair of deviated septum     ESOPHAGOSCOPY, GASTROSCOPY, DUODENOSCOPY (EGD), COMBINED N/A 8/4/2016    Procedure: COMBINED ESOPHAGOSCOPY, GASTROSCOPY, DUODENOSCOPY (EGD), BIOPSY SINGLE OR MULTIPLE;  Surgeon: Trent Pederson MD;  Location:  GI     ESOPHAGOSCOPY, GASTROSCOPY, DUODENOSCOPY (EGD), COMBINED N/A 8/27/2017    Procedure: COMBINED ESOPHAGOSCOPY, GASTROSCOPY, DUODENOSCOPY (EGD);;  Surgeon: Los Wynn MD;  Location:  GI     INCISION AND DRAINAGE ABDOMEN WASHOUT, COMBINED Right 2/14/2018    Procedure: COMBINED INCISION AND DRAINAGE ABDOMEN WASHOUT;  COMBINED INCISION AND DRAINAGE right ABDOMEN flank;  Surgeon: Sara Dinh MD;  Location: [facility]      LAPAROTOMY EXPLORATORY N/A 2017    Procedure: LAPAROTOMY EXPLORATORY;  Exploratory Laparotomy, washout;  Surgeon: Tip Zhang MD;  Location: [facility] OR     LAPAROTOMY EXPLORATORY N/A 2017    Procedure: LAPAROTOMY EXPLORATORY;  Exploratory Laparotomy, Washout with closure.;  Surgeon: Tip Zhang MD;  Location: [facility] OR     LAPAROTOMY EXPLORATORY N/A 2017    Procedure: LAPAROTOMY EXPLORATORY;  Exploratory Laparotomy, Right angelique-colectomy, end ileostomy, mucosal fistula, partial omentectomy;  Surgeon: Sara Dinh MD;  Location: [facility] OR     ORTHOPEDIC SURGERY Right     Repair of dislocated wrist.       RELEASE TRIGGER FINGER Right 11/10/2017    Procedure: RELEASE TRIGGER FINGER;  Debride, V-Y Flap Right Index Finger;  Surgeon: Momo Noonan MD;  Location: [facility] OR     TAKEDOWN ILEOSTOMY N/A 2018    Procedure: TAKEDOWN ILEOSTOMY;  Exploratory Laparotomy, Lysis of Adhesions, Takedown Of End Ileostomy, Takedown of mucocutaneous fistula, ileocolic resection  And Colorectal Anastomosis, Primary repair of Ventral Hernia, Anesthesia Block ;  Surgeon: Sara Dinh MD;  Location: [facility] OR     TRACHEOSTOMY  2001    During hospitalization for pneumonia.       TRANSPLANT LIVER RECIPIENT  DONOR N/A 3/4/2017    Procedure: TRANSPLANT LIVER RECIPIENT  DONOR;  Surgeon: Jovan Tran MD;  Location: [facility] OR            Social History:     Social History     Socioeconomic History     Marital status:      Spouse name: Not on file     Number of children: 1     Years of education: Not on file     Highest education level: Not on file   Occupational History     Occupation:     Social Needs     Financial resource strain: Not on file     Food insecurity     Worry: Not on file     Inability: Not on file     Transportation needs     Medical: Not on file     Non-medical: Not on file   Tobacco Use     Smoking status: Former Smoker     Packs/day: 0.25      Years: 2.00     Pack years: 0.50     Types: Cigarettes     Last attempt to quit: 1985     Years since quittin.7     Smokeless tobacco: Former User     Types: Chew     Quit date: 10/8/2015     Tobacco comment: 1 Cigar once every other month.   Substance and Sexual Activity     Alcohol use: No     Alcohol/week: 0.0 standard drinks     Comment: No alcohol since liver transplant.       Drug use: No     Sexual activity: Not Currently     Partners: Female   Lifestyle     Physical activity     Days per week: Not on file     Minutes per session: Not on file     Stress: Not on file   Relationships     Social connections     Talks on phone: Not on file     Gets together: Not on file     Attends [employer] service: Not on file     Active member of club or organization: Not on file     Attends meetings of clubs or organizations: Not on file     Relationship status: Not on file     Intimate partner violence     Fear of current or ex partner: Not on file     Emotionally abused: Not on file     Physically abused: Not on file     Forced sexual activity: Not on file   Other Topics Concern     Parent/sibling w/ CABG, MI or angioplasty before 65F 55M? 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     Cervical Cancer Maternal Grandmother      Cerebrovascular Disease Maternal Grandfather      No Known Problems Paternal Grandmother      Prostate Cancer Paternal Grandfather      Colon Cancer No family hx of      Hyperlipidemia No family hx of      Coronary Artery Disease No family hx of      Breast Cancer No family hx of             Medications:     Current Outpatient Medications   Medication Sig     alcohol swab prep pads Use to swab area of  injection/francisca as directed.     alpha-lipoic acid 600 MG capsule Take 600 mg by mouth     amLODIPine (NORVASC) 10 MG tablet Take 1 tablet (10 mg) by mouth daily     blood glucose (NO BRAND SPECIFIED) test strip Use to test blood sugar 3 times daily or as directed. Any covered brand preferred by Pt that works with meter.     blood glucose calibration (NO BRAND SPECIFIED) solution Use to calibrate meter.     blood glucose monitoring (NO BRAND SPECIFIED) meter device kit Use to test blood sugar 3 times daily or as directed. Any covered brand preferred by Pt.     cholecalciferol (VITAMIN D3) 1000 UNIT tablet Take 1 tablet (1,000 Units) by mouth daily (Patient taking differently: Take 1,000 Units by mouth every morning )     CIALIS 5 MG tablet Take 5 mg by mouth as needed      cyclobenzaprine (FLEXERIL) 10 MG tablet Take 1 tablet (10 mg) by mouth 3 times daily as needed for muscle spasms     fluticasone (FLONASE) 50 MCG/ACT nasal spray Spray 1 spray into both nostrils daily     furosemide (LASIX) 40 MG tablet Take 1 tablet (40 mg) by mouth 2 times daily     gabapentin (NEURONTIN) 300 MG capsule Take 1 capsule (300 mg) by mouth 3 times daily     Glucose Blood (BLOOD GLUCOSE TEST STRIPS) STRP 1 strip by Lancet route 3 times daily     insulin glargine (LANTUS SOLOSTAR) 100 UNIT/ML pen Inject 55 Units Subcutaneous every morning     Insulin Lispro (HUMALOG KWIKPEN) 200 UNIT/ML soln Use one unit per 3 grams carbohydrates for all meals and snacks. Total daily dose up to 60 U.     insulin pen needle (BD ENE U/F) 32G X 4 MM miscellaneous Use 1 pen needle 4 times daily or as directed.     metoprolol succinate ER (TOPROL-XL) 200 MG 24 hr tablet Take 1 tablet (200 mg) by mouth daily Take a total of 150 mg daily     multivitamin, therapeutic with minerals (MULTI-VITAMIN) TABS tablet Take 1 tablet by mouth every morning     mycophenolic acid (GENERIC EQUIVALENT) 360 MG EC tablet Take 2 tablets (720 mg) by mouth every 12 hours      omeprazole (PRILOSEC) 20 MG DR capsule Take 1 capsule (20 mg) by mouth daily     oxyCODONE (ROXICODONE) 5 MG tablet Take 1 tablet (5 mg) by mouth every 4 hours as needed for pain maximum 6 tablet(s) per day     patiromer (VELTASSA) 8.4 g packet Take 8.4 g by mouth daily     predniSONE (DELTASONE) 2.5 MG tablet Take 1 tablet (2.5 mg) by mouth daily     tacrolimus (GENERIC EQUIVALENT) 1 MG capsule Take 5 capsules (5 mg) by mouth every 12 hours     thin (NO BRAND SPECIFIED) lancets Use with lanceting device. Any covered brand.     ursodiol (ACTIGALL) 500 MG tablet Take 1 tablet (500 mg) by mouth 2 times daily     No current facility-administered medications for this visit.               Review of Systems:   A comprehensive 10 point review of systems (constitutional, ENT, cardiac, peripheral vascular, lymphatic, respiratory, GI, , Musculoskeletal, skin, Neurological) was performed and found to be negative except as described in this note.     See intake form completed by patient        Again, thank you for allowing me to participate in the care of your patient.        Sincerely,        Isidro Mcleod MD

## 2020-09-29 NOTE — PROGRESS NOTES
[facility]  Orthopaedic Surgery Consultation by Isidro Mcleod M.D.    Camacho Bhagat MRN# [id number]   Age: 54 year old YOB: 1965     Requesting physician: No ref. provider found  Shay Kirkpatrick     Background history:  DX:  1. 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 Hepatocellular carcinoma  3. Status post liver transplant for which on 2.5 mg prednisone, tacrolimus and mycophenolate acid  4. CMV colitis for which right angelique-colectomy  5. Hypertension  6. Rheumatoid arthritis    TREATMENTS:  1. 12/17/2007 right knee arthroscopy, Dr. JOLANTA Restrepo  2. 3/4/2017, liver transplant, Dr. Tran  3. 7/26/2017, right hemicolectomy, Dr. Dinh           History of Present Illness:   54 year old male who presents to our clinic because of bilateral hip pain.  Patient describes the pain being present for approximately 2 years.  No antecedent trauma.  The pain is present on the lateral sides of his hip in the greater trochanteric region however, there is no tenderness to palpation locally.  Patient denies any night pain or initiation stiffness and soreness.  He describes some numbness and tingling of his feet.  He denies the presence of any gross motor deficits.  Patient can currently ambulate for approximately 100 yards which is limiting him significantly in his daily activities.  He does not ambulate with the help of any assistive devices.  Patient endorses lower back pain.  Patient lives independently and alone.  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  Patient is on immunosuppressant because of his liver transplant.     Current symptoms:  Problem: bilateral hip pain  Onset and duration: 1-2 years  Awakens from sleep due to sx's:  No  Precipitating Injury:  Patient reports onset of bilateral hip pain near the time of his liver transplant, with continued pain with activies thereafter.     Other joints or sites  painful:  Yes, multi joint pain due to osteoarthritis, and rheumatoid arthritis. Pending weather and activities his joint pain will vary. Bilateral diabetic neuropathy of feet.     Social:   Occupation: not currently working  Living situation: lives alone with dog   Hobbies / Sports: watch baseball, football, working on cars, hunting, fishing, yardwork    Smoking: No  Alcohol: No  Illicit drug use: No         Physical Exam:     EXAMINATION pertinent findings:   PSYCH: Pleasant, healthy-appearing, alert, oriented x3, cooperative. Normal mood and affect.  VITAL SIGNS: Blood pressure (!) 146/90, height 1.829 m (6'), weight 112.5 kg (248 lb)..  Reviewed nursing intake notes.   Body mass index is 33.63 kg/m .  RESP: non labored breathing   ABD: benign, soft, non-tender, no acute peritoneal findings  SKIN: grossly normal   LYMPHATIC: grossly normal, no adenopathy, no extremity edema  NEURO: grossly normal , no motor deficits  VASCULAR: satisfactory perfusion of all extremities   MUSCULOSKELETAL:   Alignment: Slight valgus alignment of left lower extremity well the right seems neutral.  Gait: Careful but nonantalgic gait.  Lasegue's test negative  Hips:   L hip: ROM    , Extension 100  , IRF 20  , ERF 40  , ABD 40  , ADD 20     R hip: ROM    , Extension 100  , IRF 20  , ERF 40  , ABD 40  , ADD 20    There is some tenderness to palpation over the greater trochanteric regions bilaterally.  However, patient states that this is not the pain that he is experiencing daily.  Resistance testing of the abductors does not elicit any pain.    Bilateral LE:   Thigh and leg compartments soft and compressible   +Quad/TA/GSC/FHL/EHL   SILT DP/SP/Maya/Saph/Tib nerve distributions.  Some numbness and tingling on the lateral edge of the bilateral feet.   Palpable dorsalis pedis pulse              Data:   All laboratory data reviewed  All imaging studies reviewed by me personally    MRI left hip 9/9/2020:   1. No acute osseous  abnormality.     2. Mild osteoarthrosis of the left hip.     3. Tendinosis of the gluteus medius with partial-thickness tearing of  the anterior fibers at the footprint.    Most notably also no signs of osteonecrosis of the femoral head.    MRI right hip 9/9/2020:  Impression:  1. No acute osseous abnormality.     2. At least mild osteoarthrosis of the right hip.     3. Tendinosis of the gluteus minimus and gluteus medius minimus with  partial tearing of the gluteus minimus.    Most notably also no signs of osteonecrosis of the femoral head.           Assessment and Plan:   Assessment:  54-year-old male with a specific pain of bilateral hips.  Initial diagnoses include osteoarthritis, osteonecrosis, lumbar spine pathology or bursitis of greater trochanter or piriformis.     Plan:  Extensively discussed my findings with the patient.  Although it seems reasonable that his pain is caused by his hip osteoarthritis I am not 100% certain of this.  Therefore, I think it is in his best interest to further evaluate the origin of his pain by means of an intra-articular injection of bilateral hips with a combination of lidocaine and cortisone.  I advised patient that after the injection she should do the things that usually hurt his hips to see if the lidocaine portion would alleviate his symptoms.  I also discussed with him to write these changes down so we could evaluate them 2 weeks after injection via virtual clinic.  She understands and agrees to the treatment plan as set forth.  All questions were answered to the best of my abilities.    Thank you for your referral.    Isidro Mcleod MD     Adult Reconstruction  [facility] Department of Orthopaedic Surgery  Pager [phone]      DATA for DOCUMENTATION:         Past Medical History:     Patient Active Problem List   Diagnosis     Carpal tunnel syndrome     Testicular hypofunction     Fibromyalgia     Sicca syndrome (H)     Medication  refill- do not delete      Hepatocellular carcinoma (H)     Osteoarthritis     Rheumatoid arthritis (H)     Hyperkalemia     Liver transplant recipient (H)     Immunosuppressed status (H)     Neutropenic colitis (H)     S/P liver transplant (H)     Pancytopenia (H)     Gangrene of finger (H)     Ischemia of both lower extremities     Elevated serum creatinine     History of creation of ostomy (H)     Peristomal skin complication     Painful periwound skin     Encounter for attention to ileostomy (H)     Ileostomy in place (H)     Abscess, intra-abdominal, postoperative     Liver transplant rejection (H)     CMV colitis (H)     Type 2 diabetes mellitus with diabetic polyneuropathy, with long-term current use of insulin (H)     Diarrhea of infectious origin (Plesiomonas shigelloides (formerly known Aeromonas shigelloides) in 3/14/2019 sample)     Hypertension secondary to other renal disorders     Past Medical History:   Diagnosis Date     Depressive disorder, not elsewhere classified      Diabetes type 2, controlled (H) 11/10/2016     Esophageal reflux      Fibromyalgia 1/2009    dx with Dr Benitez( Rheum)     Gangrene of finger (H) 8/25/2017     H/O deep venous thrombosis 11/2001    Permanent IVC filter in place.     H/O CASTAÑEDA (nonalcoholic steatohepatitis)      H/O Pneumonia, organism unspecified(486) 10/2001    Included ARDS, sepsis, and  acute renal failure; hospitalized, required tracheostomy placement.     H/O: HTN (hypertension) 11/2001    No longer prescribed antihypertensive medication.       History of CVA (cerebrovascular accident)      History of hepatocellular carcinoma      History of liver transplant (H)      History of obstructive sleep apnea     No longer wears CPAP since losing approximately 200 pounds with his liver transplant and its complications.       HLD (hyperlipidemia)      Ischemia of both lower extremities 8/25/2017    Distal ischemia due to shock/high pressor requirements     Liver  transplant rejection (H) 6/11/2018     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     Osteoarthritis      Presence of PERMANENT IVC filter      Rheumatoid arthritis(714.0)        Also see scanned health assessment forms.       Past Surgical History:     Past Surgical History:   Procedure Laterality Date     BENCH LIVER N/A 3/4/2017    Procedure: BENCH LIVER;  Surgeon: Jovan Tran MD;  Location: [facility] OR     C TOTAL KNEE ARTHROPLASTY  2008    Right knee arthroscopy     CHOLECYSTECTOMY       COLONOSCOPY N/A 7/21/2017    Procedure: COMBINED COLONOSCOPY, SINGLE OR MULTIPLE BIOPSY/POLYPECTOMY BY BIOPSY;  Colonoscopy;  Surgeon: Izaiah Montes MD;  Location: [facility] GI     ENDOSCOPIC RETROGRADE CHOLANGIOPANCREATOGRAM N/A 5/22/2018    Procedure: COMBINED ENDOSCOPIC RETROGRADE CHOLANGIOPANCREATOGRAPHY, PLACE TUBE/STENT;  Endoscopic Retrograde Cholangiopancreatography with sphincterotomy and pancreatic duct stent placement;  Surgeon: Zay Gaitan MD;  Location: [facility] OR     ENT SURGERY      Repair of deviated septum     ESOPHAGOSCOPY, GASTROSCOPY, DUODENOSCOPY (EGD), COMBINED N/A 8/4/2016    Procedure: COMBINED ESOPHAGOSCOPY, GASTROSCOPY, DUODENOSCOPY (EGD), BIOPSY SINGLE OR MULTIPLE;  Surgeon: Trent Pederson MD;  Location:  GI     ESOPHAGOSCOPY, GASTROSCOPY, DUODENOSCOPY (EGD), COMBINED N/A 8/27/2017    Procedure: COMBINED ESOPHAGOSCOPY, GASTROSCOPY, DUODENOSCOPY (EGD);;  Surgeon: Los Wynn MD;  Location: [facility] GI     INCISION AND DRAINAGE ABDOMEN WASHOUT, COMBINED Right 2/14/2018    Procedure: COMBINED INCISION AND DRAINAGE ABDOMEN WASHOUT;  COMBINED INCISION AND DRAINAGE right ABDOMEN flank;  Surgeon: Sara Dinh MD;  Location: [facility] OR     LAPAROTOMY EXPLORATORY N/A 7/4/2017    Procedure: LAPAROTOMY EXPLORATORY;  Exploratory Laparotomy, washout;  Surgeon: Tip Zhang MD;  Location: [facility] OR     LAPAROTOMY EXPLORATORY N/A 7/5/2017    Procedure: LAPAROTOMY EXPLORATORY;  Exploratory  Laparotomy, Washout with closure.;  Surgeon: Tip Zhang MD;  [facility]: [facility]     LAPAROTOMY EXPLORATORY N/A 2017    Procedure: LAPAROTOMY EXPLORATORY;  Exploratory Laparotomy, Right angelique-colectomy, end ileostomy, mucosal fistula, partial omentectomy;  Surgeon: Sara Dinh MD;  Location: [facility] OR     ORTHOPEDIC SURGERY Right     Repair of dislocated wrist.       RELEASE TRIGGER FINGER Right 11/10/2017    Procedure: RELEASE TRIGGER FINGER;  Debride, V-Y Flap Right Index Finger;  Surgeon: Momo Noonan MD;  [facility]     TAKEDOWN ILEOSTOMY N/A 2018    Procedure: TAKEDOWN ILEOSTOMY;  Exploratory Laparotomy, Lysis of Adhesions, Takedown Of End Ileostomy, Takedown of mucocutaneous fistula, ileocolic resection  And Colorectal Anastomosis, Primary repair of Ventral Hernia, Anesthesia Block ;  Surgeon: Sara Dinh MD;  Location: [facility] OR     TRACHEOSTOMY  2001    During hospitalization for pneumonia.       TRANSPLANT LIVER RECIPIENT  DONOR N/A 3/4/2017    Procedure: TRANSPLANT LIVER RECIPIENT  DONOR;  Surgeon: Jovan Tran MD;  Location: [facility] OR            Social History:     Social History     Socioeconomic History     Marital status:      Spouse name: Not on file     Number of children: 1     Years of education: Not on file     Highest education level: Not on file   Occupational History     Occupation:     Social Needs     Financial resource strain: Not on file     Food insecurity     Worry: Not on file     Inability: Not on file     Transportation needs     Medical: Not on file     Non-medical: Not on file   Tobacco Use     Smoking status: Former Smoker     Packs/day: 0.25     Years: 2.00     Pack years: 0.50     Types: Cigarettes     Last attempt to quit: 1985     Years since quittin.7     Smokeless tobacco: Former User     Types: Chew     Quit date: 10/8/2015     Tobacco comment: 1 Cigar once every other month.    Substance and Sexual Activity     Alcohol use: No     Alcohol/week: 0.0 standard drinks     Comment: No alcohol since liver transplant.       Drug use: No     Sexual activity: Not Currently     Partners: Female   Lifestyle     Physical activity     Days per week: Not on file     Minutes per session: Not on file     Stress: Not on file   Relationships     Social connections     Talks on phone: Not on file     Gets together: Not on file     Attends Alevism service: Not on file     Active member of club or organization: Not on file     Attends meetings of clubs or organizations: Not on file     Relationship status: Not on file     Intimate partner violence     Fear of current or ex partner: Not on file     Emotionally abused: Not on file     Physically abused: Not on file     Forced sexual activity: Not on file   Other Topics Concern     Parent/sibling w/ CABG, MI or angioplasty before 65F 55M? 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     Cervical Cancer Maternal Grandmother      Cerebrovascular Disease Maternal Grandfather      No Known Problems Paternal Grandmother      Prostate Cancer Paternal Grandfather      Colon Cancer No family hx of      Hyperlipidemia No family hx of      Coronary Artery Disease No family hx of      Breast Cancer No family hx of             Medications:     Current Outpatient Medications   Medication Sig     alcohol swab prep pads Use to swab area of injection/francisca as directed.     alpha-lipoic acid 600 MG capsule Take 600 mg by mouth     amLODIPine (NORVASC) 10 MG tablet Take 1 tablet (10 mg) by mouth daily     blood glucose (NO BRAND SPECIFIED) test strip Use to test blood sugar 3 times daily or as  directed. Any covered brand preferred by Pt that works with meter.     blood glucose calibration (NO BRAND SPECIFIED) solution Use to calibrate meter.     blood glucose monitoring (NO BRAND SPECIFIED) meter device kit Use to test blood sugar 3 times daily or as directed. Any covered brand preferred by Pt.     cholecalciferol (VITAMIN D3) 1000 UNIT tablet Take 1 tablet (1,000 Units) by mouth daily (Patient taking differently: Take 1,000 Units by mouth every morning )     CIALIS 5 MG tablet Take 5 mg by mouth as needed      cyclobenzaprine (FLEXERIL) 10 MG tablet Take 1 tablet (10 mg) by mouth 3 times daily as needed for muscle spasms     fluticasone (FLONASE) 50 MCG/ACT nasal spray Spray 1 spray into both nostrils daily     furosemide (LASIX) 40 MG tablet Take 1 tablet (40 mg) by mouth 2 times daily     gabapentin (NEURONTIN) 300 MG capsule Take 1 capsule (300 mg) by mouth 3 times daily     Glucose Blood (BLOOD GLUCOSE TEST STRIPS) STRP 1 strip by Lancet route 3 times daily     insulin glargine (LANTUS SOLOSTAR) 100 UNIT/ML pen Inject 55 Units Subcutaneous every morning     Insulin Lispro (HUMALOG KWIKPEN) 200 UNIT/ML soln Use one unit per 3 grams carbohydrates for all meals and snacks. Total daily dose up to 60 U.     insulin pen needle (BD ENE U/F) 32G X 4 MM miscellaneous Use 1 pen needle 4 times daily or as directed.     metoprolol succinate ER (TOPROL-XL) 200 MG 24 hr tablet Take 1 tablet (200 mg) by mouth daily Take a total of 150 mg daily     multivitamin, therapeutic with minerals (MULTI-VITAMIN) TABS tablet Take 1 tablet by mouth every morning     mycophenolic acid (GENERIC EQUIVALENT) 360 MG EC tablet Take 2 tablets (720 mg) by mouth every 12 hours     omeprazole (PRILOSEC) 20 MG DR capsule Take 1 capsule (20 mg) by mouth daily     oxyCODONE (ROXICODONE) 5 MG tablet Take 1 tablet (5 mg) by mouth every 4 hours as needed for pain maximum 6 tablet(s) per day     patiromer (VELTASSA) 8.4 g packet Take 8.4 g by  mouth daily     predniSONE (DELTASONE) 2.5 MG tablet Take 1 tablet (2.5 mg) by mouth daily     tacrolimus (GENERIC EQUIVALENT) 1 MG capsule Take 5 capsules (5 mg) by mouth every 12 hours     thin (NO BRAND SPECIFIED) lancets Use with lanceting device. Any covered brand.     ursodiol (ACTIGALL) 500 MG tablet Take 1 tablet (500 mg) by mouth 2 times daily     No current facility-administered medications for this visit.               Review of Systems:   A comprehensive 10 point review of systems (constitutional, ENT, cardiac, peripheral vascular, lymphatic, respiratory, GI, , Musculoskeletal, skin, Neurological) was performed and found to be negative except as described in this note.     See intake form completed by patient

## 2020-09-29 NOTE — PATIENT INSTRUCTIONS
Orders for bilateral intra-articular hip injections ordered.   Scheduled with Dr. Yeo on 10/5/20 at 3:00pm.  Appointment will occur at [facility], [id number], the same office you were seen at today.     Follow up via Telephone Visit 2 weeks after injection to determine relief obtained, and further recommendations.   Appointment scheduled on 10/20/20 at 3:00pm.    Call with any questions or concerns, [phone].

## 2020-10-04 ENCOUNTER — MYC REFILL (OUTPATIENT)
Dept: INTERNAL MEDICINE | Facility: CLINIC | Age: 55
End: 2020-10-04

## 2020-10-04 DIAGNOSIS — M15.0 PRIMARY OSTEOARTHRITIS INVOLVING MULTIPLE JOINTS: ICD-10-CM

## 2020-10-05 ENCOUNTER — OFFICE VISIT (OUTPATIENT)
Dept: ORTHOPEDICS | Facility: CLINIC | Age: 55
End: 2020-10-05
Payer: COMMERCIAL

## 2020-10-05 DIAGNOSIS — M25.551 BILATERAL HIP PAIN: ICD-10-CM

## 2020-10-05 DIAGNOSIS — M25.552 BILATERAL HIP PAIN: ICD-10-CM

## 2020-10-05 PROCEDURE — 20611 DRAIN/INJ JOINT/BURSA W/US: CPT | Mod: 50 | Performed by: FAMILY MEDICINE

## 2020-10-05 RX ORDER — METHYLPREDNISOLONE ACETATE 40 MG/ML
40 INJECTION, SUSPENSION INTRA-ARTICULAR; INTRALESIONAL; INTRAMUSCULAR; SOFT TISSUE
Status: DISCONTINUED | OUTPATIENT
Start: 2020-10-05 | End: 2020-10-20

## 2020-10-05 RX ORDER — OXYCODONE HYDROCHLORIDE 5 MG/1
5 TABLET ORAL EVERY 4 HOURS PRN
Qty: 30 TABLET | Refills: 0 | Status: SHIPPED | OUTPATIENT
Start: 2020-10-05 | End: 2020-12-07

## 2020-10-05 RX ADMIN — METHYLPREDNISOLONE ACETATE 40 MG: 40 INJECTION, SUSPENSION INTRA-ARTICULAR; INTRALESIONAL; INTRAMUSCULAR; SOFT TISSUE at 15:31

## 2020-10-05 NOTE — TELEPHONE ENCOUNTER
Reason For Call:        Chief Complaint   Patient presents with     Refill Request                 Medication Name, Dose and Monthly Quantity:   oxyCODONE (ROXICODONE) 5 MG tablet    Diagnosis requiring opiates:   Primary osteoarthritis involving multiple joints      Problem List Updated:   Yes     Opioid Agreement On File - [facility] PAIN CONTRACT ID# [id number]:       Last Urine Drug Screen (at least once every 12 months) Date:     Unexpected Results:        [place]  Data Reviewed (at least once every 3 months) Date:   06/03/19  Unexpected Results:         Last Fill Date:   05/01/2020  Next Fill Date:   10/05/2020  Last Visit with PCP:   11/27/2019    Future Visits with PCP:    Nothing scheduled  Processing:     RO BONNER CMA at 8:37 AM on 10/5/2020.

## 2020-10-05 NOTE — PATIENT INSTRUCTIONS
1. Bilateral hip pain      -Patient is referred for bilateral intra-articular hip cortisone injections at the direction of Dr. Mcleod  -Patient tolerated procedure very therapy or complications.  Patient given post-procedure stretches  -Patient will follow up with Dr. Mcleod in 2 weeks to assess response to cortisone injection  -Patient also mentions left sided lumbar pain.  This pain is unlikely to improve with today's cortisone injection.  If pain persists, patient may bring this up with Dr. Mcleod to possibly place an order for a pain management consultation.  -Call direct clinic number [[phone]] at any time with questions or concerns.    Albert Yeo MD [facility] Orthopedics and Sports Medicine  [facility] [place]

## 2020-10-05 NOTE — LETTER
10/5/2020         RE: Camacho Bhagat  [address] [facility] [address]        Dear Colleague,    Thank you for referring your patient, Camacho Bhagat, to the [facility] [place]. Please see a copy of my visit note below.    ASSESSMENT & PLAN  Patient Instructions     1. Bilateral hip pain      -Patient is referred for bilateral intra-articular hip cortisone injections at the direction of Dr. Mcleod  -Patient tolerated procedure very therapy or complications.  Patient given post-procedure stretches  -Patient will follow up with Dr. Mcleod in 2 weeks to assess response to cortisone injection  -Patient also mentions left sided lumbar pain.  This pain is unlikely to improve with today's cortisone injection.  If pain persists, patient may bring this up with Dr. Mcleod to possibly place an order for a pain management consultation.  -Call direct clinic number [[phone]] at any time with questions or concerns.    Albert Yeo MD [facility] Orthopedics and Sports Medicine  [facility] [place]          -----    SUBJECTIVE:  Camacho Bhagat is a 55 year old male who is seen for bilateral hip ia csi at the request of Dr. Diop.    Large Joint Injection/Arthocentesis: bilateral hip joint    Date/Time: 10/5/2020 3:31 PM  Performed by: Yeo, Albert, MD  Authorized by: Yeo, Albert, MD     Indications:  Pain and osteoarthritis  Needle Size:  22 G  Guidance: ultrasound    Approach:  Anterior  Location:  Hip  Laterality:  Bilateral      Site:  Bilateral hip joint  Medications (Right):  40 mg methylPREDNISolone 40 MG/ML  Medications (Left):  40 mg methylPREDNISolone 40 MG/ML  Outcome:  Tolerated well, no immediate complications  Procedure discussed: discussed risks, benefits, and alternatives    Consent Given by:  Patient  Timeout: timeout called immediately prior to procedure    Prep: patient was prepped and draped in usual sterile fashion            Evan  Yeo MD, [facility]        Again, thank you for allowing me to participate in the care of your patient.        Sincerely,        Albert Yeo, MD

## 2020-10-05 NOTE — PROGRESS NOTES
ASSESSMENT & PLAN  Patient Instructions     1. Bilateral hip pain      -Patient is referred for bilateral intra-articular hip cortisone injections at the direction of Dr. Mcleod  -Patient tolerated procedure very therapy or complications.  Patient given post-procedure stretches  -Patient will follow up with Dr. Mcleod in 2 weeks to assess response to cortisone injection  -Patient also mentions left sided lumbar pain.  This pain is unlikely to improve with today's cortisone injection.  If pain persists, patient may bring this up with Dr. Mcleod to possibly place an order for a pain management consultation.  -Call direct clinic number [[phone]] at any time with questions or concerns.    Albert Yeo MD [facility] Orthopedics and Sports Medicine  [facility]          -----    SUBJECTIVE:  Camacho Bhagat is a 55 year old male who is seen for bilateral hip ia csi at the request of Dr. Diop.    Large Joint Injection/Arthocentesis: bilateral hip joint    Date/Time: 10/5/2020 3:31 PM  Performed by: Yeo, Albert, MD  Authorized by: Yeo, Albert, MD     Indications:  Pain and osteoarthritis  Needle Size:  22 G  Guidance: ultrasound    Approach:  Anterior  Location:  Hip  Laterality:  Bilateral      Site:  Bilateral hip joint  Medications (Right):  40 mg methylPREDNISolone 40 MG/ML  Medications (Left):  40 mg methylPREDNISolone 40 MG/ML  Outcome:  Tolerated well, no immediate complications  Procedure discussed: discussed risks, benefits, and alternatives    Consent Given by:  Patient  Timeout: timeout called immediately prior to procedure    Prep: patient was prepped and draped in usual sterile fashion            Albert Yeo MD, [facility]

## 2020-10-06 ENCOUNTER — TELEPHONE (OUTPATIENT)
Dept: TRANSPLANT | Facility: CLINIC | Age: 55
End: 2020-10-06

## 2020-10-06 ENCOUNTER — HOSPITAL ENCOUNTER (OUTPATIENT)
Dept: LAB | Facility: CLINIC | Age: 55
Discharge: HOME | End: 2020-10-06
Attending: INTERNAL MEDICINE | Admitting: INTERNAL MEDICINE
Payer: COMMERCIAL

## 2020-10-06 DIAGNOSIS — Z94.4 LIVER REPLACED BY TRANSPLANT (H): ICD-10-CM

## 2020-10-06 DIAGNOSIS — Z79.4 TYPE 2 DIABETES MELLITUS WITH DIABETIC POLYNEUROPATHY, WITH LONG-TERM CURRENT USE OF INSULIN (H): ICD-10-CM

## 2020-10-06 DIAGNOSIS — E11.42 TYPE 2 DIABETES MELLITUS WITH DIABETIC POLYNEUROPATHY, WITH LONG-TERM CURRENT USE OF INSULIN (H): ICD-10-CM

## 2020-10-06 DIAGNOSIS — Z13.220 LIPID SCREENING: ICD-10-CM

## 2020-10-06 LAB
ALBUMIN SERPL-MCNC: 4 G/DL (ref 3.4–5)
ALP SERPL-CCNC: 267 U/L (ref 40–150)
ALT SERPL W P-5'-P-CCNC: 36 U/L (ref 0–70)
ANION GAP SERPL CALCULATED.3IONS-SCNC: 8 MMOL/L (ref 3–14)
AST SERPL W P-5'-P-CCNC: 17 U/L (ref 0–45)
BILIRUB DIRECT SERPL-MCNC: 0.2 MG/DL (ref 0–0.2)
BILIRUB SERPL-MCNC: 0.5 MG/DL (ref 0.2–1.3)
BUN SERPL-MCNC: 55 MG/DL (ref 7–30)
CALCIUM SERPL-MCNC: 9.3 MG/DL (ref 8.5–10.1)
CHLORIDE SERPL-SCNC: 104 MMOL/L (ref 94–109)
CO2 SERPL-SCNC: 20 MMOL/L (ref 20–32)
CREAT SERPL-MCNC: 1.74 MG/DL (ref 0.66–1.25)
ERYTHROCYTE [DISTWIDTH] IN BLOOD BY AUTOMATED COUNT: 13.4 % (ref 10–15)
GFR SERPL CREATININE-BSD FRML MDRD: 43 ML/MIN/{1.73_M2}
GLUCOSE SERPL-MCNC: 493 MG/DL (ref 70–99)
HBA1C MFR BLD: 6.2 % (ref 0–5.6)
HCT VFR BLD AUTO: 37.7 % (ref 40–53)
HGB BLD-MCNC: 12.3 G/DL (ref 13.3–17.7)
MCH RBC QN AUTO: 30.4 PG (ref 26.5–33)
MCHC RBC AUTO-ENTMCNC: 32.6 G/DL (ref 31.5–36.5)
MCV RBC AUTO: 93 FL (ref 78–100)
PLATELET # BLD AUTO: 108 10E9/L (ref 150–450)
POTASSIUM SERPL-SCNC: 5.3 MMOL/L (ref 3.4–5.3)
PROT SERPL-MCNC: 7.5 G/DL (ref 6.8–8.8)
RBC # BLD AUTO: 4.04 10E12/L (ref 4.4–5.9)
SODIUM SERPL-SCNC: 132 MMOL/L (ref 133–144)
TACROLIMUS BLD-MCNC: 5.3 UG/L (ref 5–15)
TME LAST DOSE: NORMAL H
WBC # BLD AUTO: 13.9 10E9/L (ref 4–11)

## 2020-10-06 PROCEDURE — 36415 COLL VENOUS BLD VENIPUNCTURE: CPT | Performed by: INTERNAL MEDICINE

## 2020-10-06 PROCEDURE — 85027 COMPLETE CBC AUTOMATED: CPT | Performed by: INTERNAL MEDICINE

## 2020-10-06 PROCEDURE — 80197 ASSAY OF TACROLIMUS: CPT | Performed by: INTERNAL MEDICINE

## 2020-10-06 PROCEDURE — 80076 HEPATIC FUNCTION PANEL: CPT | Performed by: INTERNAL MEDICINE

## 2020-10-06 PROCEDURE — 80048 BASIC METABOLIC PNL TOTAL CA: CPT | Performed by: INTERNAL MEDICINE

## 2020-10-06 PROCEDURE — 83036 HEMOGLOBIN GLYCOSYLATED A1C: CPT | Performed by: INTERNAL MEDICINE

## 2020-10-06 NOTE — TELEPHONE ENCOUNTER
Patient Call: General  Route to LPN    Reason for call: pt would like to review with Gisselle [facility] would [facility] for [facility]  Says Dr Camejo is just liver  Also has other questions to ask her     Call back needed? Yes    Return Call Needed  Same as documented in contacts section  When to return call?: Greater than one day: Route standard priority

## 2020-10-20 ENCOUNTER — VIRTUAL VISIT (OUTPATIENT)
Dept: ORTHOPEDICS | Facility: CLINIC | Age: 55
End: 2020-10-20
Payer: COMMERCIAL

## 2020-10-20 VITALS — HEIGHT: 72 IN | BODY MASS INDEX: 33.59 KG/M2 | WEIGHT: 248 LBS

## 2020-10-20 DIAGNOSIS — M16.0 PRIMARY OSTEOARTHRITIS OF BOTH HIPS: Primary | ICD-10-CM

## 2020-10-20 PROCEDURE — 99213 OFFICE O/P EST LOW 20 MIN: CPT | Mod: 95 | Performed by: STUDENT IN AN ORGANIZED HEALTH CARE EDUCATION/TRAINING PROGRAM

## 2020-10-20 ASSESSMENT — MIFFLIN-ST. JEOR: SCORE: 1992.92

## 2020-10-20 NOTE — PROGRESS NOTES
"Camacho Bhagat is a 55 year old male who is being evaluated via a billable telephone visit.      The patient has been notified of following:     \"This telephone visit will be conducted via a call between you and your physician/provider. We have found that certain health care needs can be provided without the need for a physical exam.  This service lets us provide the care you need with a short phone conversation.  If a prescription is necessary we can send it directly to your pharmacy.  If lab work is needed we can place an order for that and you can then stop by our lab to have the test done at a later time.    Telephone visits are billed at different rates depending on your insurance coverage. During this emergency period, for some insurers they may be billed the same as an in-person visit.  Please reach out to your insurance provider with any questions.    If during the course of the call the physician/provider feels a telephone visit is not appropriate, you will not be charged for this service.\"    Patient has given verbal consent for Telephone visit?  Yes    What phone number would you like to be contacted at?     How would you like to obtain your AVS? [facility]  Orthopaedic Surgery Consultation by Isidro Mcleod M.D.    Camacho Bhagat MRN# [id number]   Age: 54 year old YOB: 1965     Requesting physician: No ref. provider found  Sick[facility]ay     Background history:  DX:  1. 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 Hepatocellular carcinoma  3. Status post liver transplant for which on 2.5 mg prednisone, tacrolimus and mycophenolate acid  4. CMV colitis for which right angelique-colectomy  5. Hypertension  6. Rheumatoid arthritis    TREATMENTS:  1. 12/17/2007 right knee arthroscopy, Dr. JOLANTA Restrepo  2. 3/4/2017, liver transplant, Dr. Tran  3. 7/26/2017, right hemicolectomy, Dr. Dinh           History of Present Illness:   54 year old " male who presents to our clinic because of bilateral hip pain. Aspecific pain.  Differential diagnoses include osteoarthritis, osteonecrosis, lumbar spine pathology or bursitis of greater trochanter or piriformis.    Patient received intra-articular injections of both hips by Dr. Yeo on 10/5/2020.  Patient states that his pain has completely resolved in both hips and continues to do so.    Social:   Occupation: not currently working  Living situation: lives alone with dog   Hobbies / Sports: watch baseball, football, working on cars, hunting, fishing, yardwork    Smoking: No  Alcohol: No  Illicit drug use: No         Physical Exam:     EXAMINATION pertinent findings:   PSYCH: Pleasant, healthy-appearing, alert, oriented x3, cooperative. Normal mood and affect.  RESP: non labored breathing   Deferred due to nature of visit.           Data:   All laboratory data reviewed  All imaging studies reviewed by me personally    MRI left hip 9/9/2020:   1. No acute osseous abnormality.     2. Mild osteoarthrosis of the left hip.     3. Tendinosis of the gluteus medius with partial-thickness tearing of  the anterior fibers at the footprint.    Most notably also no signs of osteonecrosis of the femoral head.    MRI right hip 9/9/2020:  Impression:  1. No acute osseous abnormality.     2. At least mild osteoarthrosis of the right hip.     3. Tendinosis of the gluteus minimus and gluteus medius minimus with  partial tearing of the gluteus minimus.    Most notably also no signs of osteonecrosis of the femoral head.           Assessment and Plan:   Assessment:  54-year-old male with pain of bilateral hips due to osteoarthritic changes.     Plan:  I extensively discussed my findings with the patient.  Given the good effect of the intra-articular injections the origin of his pain is indeed his proper hip joints.  Given his age and extensive past medical history it would be advised to continue nonoperative management for as long as  possible.  Patient articulates understanding and agrees to that plan.  He will continue using oxycodone on an as needed basis.  We will schedule clinic follow-up on a as needed basis.  He will contact us once his pain starts to increase.  Based on the time when this happens one could either repeat the intra-articular injections or decide to proceed with surgery.   All questions were answered to the best of my abilities.      Isidro Mcleod MD     Adult Reconstruction  [facility] Department of Orthopaedic Surgery  Pager [phone]    Total Time = 15 min, 50% of which was spent in counseling and coordination of care as documented above.      Phone call duration: 15 minutes    Isidro Mcleod MD

## 2020-10-20 NOTE — PATIENT INSTRUCTIONS
Follow up as needed, or when hip pain returns.     Call my office with further questions or concerns, [phone].

## 2020-10-20 NOTE — LETTER
"    10/20/2020         RE: Camacho Bhagat  [address] [facility] [address]        Dear Colleague,    Thank you for referring your patient, Camacho Bhagat, to the [facility] [place]. Please see a copy of my visit note below.    Camacho Bhagat is a 55 year old male who is being evaluated via a billable telephone visit.      The patient has been notified of following:     \"This telephone visit will be conducted via a call between you and your physician/provider. We have found that certain health care needs can be provided without the need for a physical exam.  This service lets us provide the care you need with a short phone conversation.  If a prescription is necessary we can send it directly to your pharmacy.  If lab work is needed we can place an order for that and you can then stop by our lab to have the test done at a later time.    Telephone visits are billed at different rates depending on your insurance coverage. During this emergency period, for some insurers they may be billed the same as an in-person visit.  Please reach out to your insurance provider with any questions.    If during the course of the call the physician/provider feels a telephone visit is not appropriate, you will not be charged for this service.\"    Patient has given verbal consent for Telephone visit?  Yes    What phone number would you like to be contacted at?     How would you like to obtain your AVS? [facility]  Orthopaedic Surgery Consultation by Isidro Mcleod M.D.    Camacho Bhagat MRN# [id number]   Age: 54 year old YOB: 1965     Requesting physician: No ref. provider found  Shay Kirkpatrick     Background history:  DX:  1. 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 Hepatocellular carcinoma  3. Status post liver transplant for which on 2.5 mg prednisone, tacrolimus and mycophenolate acid  4. CMV colitis for which right " angelique-colectomy  5. Hypertension  6. Rheumatoid arthritis    TREATMENTS:  1. 12/17/2007 right knee arthroscopy, Dr. JOLANTA Restrepo  2. 3/4/2017, liver transplant, Dr. Tran  3. 7/26/2017, right hemicolectomy, Dr. Dinh           History of Present Illness:   54 year old male who presents to our clinic because of bilateral hip pain. Aspecific pain.  Differential diagnoses include osteoarthritis, osteonecrosis, lumbar spine pathology or bursitis of greater trochanter or piriformis.    Patient received intra-articular injections of both hips by Dr. Yeo on 10/5/2020.  Patient states that his pain has completely resolved in both hips and continues to do so.    Social:   Occupation: not currently working  Living situation: lives alone with dog   Hobbies / Sports: watch baseball, football, working on cars, hunting, fishing, yardwork    Smoking: No  Alcohol: No  Illicit drug use: No         Physical Exam:     EXAMINATION pertinent findings:   PSYCH: Pleasant, healthy-appearing, alert, oriented x3, cooperative. Normal mood and affect.  RESP: non labored breathing   Deferred due to nature of visit.           Data:   All laboratory data reviewed  All imaging studies reviewed by me personally    MRI left hip 9/9/2020:   1. No acute osseous abnormality.     2. Mild osteoarthrosis of the left hip.     3. Tendinosis of the gluteus medius with partial-thickness tearing of  the anterior fibers at the footprint.    Most notably also no signs of osteonecrosis of the femoral head.    MRI right hip 9/9/2020:  Impression:  1. No acute osseous abnormality.     2. At least mild osteoarthrosis of the right hip.     3. Tendinosis of the gluteus minimus and gluteus medius minimus with  partial tearing of the gluteus minimus.    Most notably also no signs of osteonecrosis of the femoral head.           Assessment and Plan:   Assessment:  54-year-old male with pain of bilateral hips due to osteoarthritic changes.     Plan:  I extensively  discussed my findings with the patient.  Given the good effect of the intra-articular injections the origin of his pain is indeed his proper hip joints.  Given his age and extensive past medical history it would be advised to continue nonoperative management for as long as possible.  Patient articulates understanding and agrees to that plan.  He will continue using oxycodone on an as needed basis.  We will schedule clinic follow-up on a as needed basis.  He will contact us once his pain starts to increase.  Based on the time when this happens one could either repeat the intra-articular injections or decide to proceed with surgery.   All questions were answered to the best of my abilities.      Isidro Mcleod MD     Adult Reconstruction  [facility] Department of Orthopaedic Surgery  Pager [phone]    Total Time = 15 min, 50% of which was spent in counseling and coordination of care as documented above.      Phone call duration: 15 minutes    Isidro Mcleod MD        Again, thank you for allowing me to participate in the care of your patient.        Sincerely,        Isidro Mcleod MD

## 2020-11-09 ENCOUNTER — MYC REFILL (OUTPATIENT)
Dept: TRANSPLANT | Facility: CLINIC | Age: 55
End: 2020-11-09

## 2020-11-09 DIAGNOSIS — Z94.4 LIVER REPLACED BY TRANSPLANT (H): ICD-10-CM

## 2020-11-10 RX ORDER — URSODIOL 500 MG/1
500 TABLET, FILM COATED ORAL 2 TIMES DAILY
Qty: 180 TABLET | Refills: 3 | Status: SHIPPED | OUTPATIENT
Start: 2020-11-10 | End: 2021-12-08

## 2020-11-24 ENCOUNTER — HOSPITAL ENCOUNTER (OUTPATIENT)
Dept: LAB | Facility: CLINIC | Age: 55
Discharge: HOME | End: 2020-11-24
Attending: INTERNAL MEDICINE | Admitting: INTERNAL MEDICINE
Payer: COMMERCIAL

## 2020-11-24 DIAGNOSIS — Z94.4 LIVER REPLACED BY TRANSPLANT (H): ICD-10-CM

## 2020-11-24 DIAGNOSIS — E11.42 TYPE 2 DIABETES MELLITUS WITH DIABETIC POLYNEUROPATHY, WITH LONG-TERM CURRENT USE OF INSULIN (H): ICD-10-CM

## 2020-11-24 DIAGNOSIS — Z13.220 LIPID SCREENING: ICD-10-CM

## 2020-11-24 DIAGNOSIS — Z79.4 TYPE 2 DIABETES MELLITUS WITH DIABETIC POLYNEUROPATHY, WITH LONG-TERM CURRENT USE OF INSULIN (H): ICD-10-CM

## 2020-11-24 DIAGNOSIS — E55.9 VITAMIN D DEFICIENCY: ICD-10-CM

## 2020-11-24 DIAGNOSIS — N18.30 ANEMIA OF CHRONIC RENAL FAILURE, STAGE 3 (MODERATE) (H): ICD-10-CM

## 2020-11-24 DIAGNOSIS — N18.30 CKD (CHRONIC KIDNEY DISEASE) STAGE 3, GFR 30-59 ML/MIN (H): ICD-10-CM

## 2020-11-24 DIAGNOSIS — D63.1 ANEMIA OF CHRONIC RENAL FAILURE, STAGE 3 (MODERATE) (H): ICD-10-CM

## 2020-11-24 LAB
ALBUMIN SERPL-MCNC: 3.6 G/DL (ref 3.4–5)
ALBUMIN UR-MCNC: 20 MG/DL
ALP SERPL-CCNC: 238 U/L (ref 40–150)
ALT SERPL W P-5'-P-CCNC: 39 U/L (ref 0–70)
ANION GAP SERPL CALCULATED.3IONS-SCNC: 3 MMOL/L (ref 3–14)
APPEARANCE UR: CLEAR
AST SERPL W P-5'-P-CCNC: 23 U/L (ref 0–45)
BILIRUB DIRECT SERPL-MCNC: 0.2 MG/DL (ref 0–0.2)
BILIRUB SERPL-MCNC: 0.7 MG/DL (ref 0.2–1.3)
BILIRUB UR QL STRIP: NEGATIVE
BUN SERPL-MCNC: 43 MG/DL (ref 7–30)
CALCIUM SERPL-MCNC: 8.8 MG/DL (ref 8.5–10.1)
CHLORIDE SERPL-SCNC: 109 MMOL/L (ref 94–109)
CHOLEST SERPL-MCNC: 134 MG/DL
CO2 SERPL-SCNC: 26 MMOL/L (ref 20–32)
COLOR UR AUTO: YELLOW
CREAT SERPL-MCNC: 1.42 MG/DL (ref 0.66–1.25)
CREAT UR-MCNC: 106 MG/DL
ERYTHROCYTE [DISTWIDTH] IN BLOOD BY AUTOMATED COUNT: 13.5 % (ref 10–15)
FERRITIN SERPL-MCNC: 1104 NG/ML (ref 26–388)
GFR SERPL CREATININE-BSD FRML MDRD: 55 ML/MIN/{1.73_M2}
GLUCOSE SERPL-MCNC: 66 MG/DL (ref 70–99)
GLUCOSE UR STRIP-MCNC: NEGATIVE MG/DL
HBA1C MFR BLD: 6.4 % (ref 0–5.6)
HCT VFR BLD AUTO: 36.4 % (ref 40–53)
HDLC SERPL-MCNC: 39 MG/DL
HGB BLD-MCNC: 12.1 G/DL (ref 13.3–17.7)
HGB UR QL STRIP: NEGATIVE
HYALINE CASTS #/AREA URNS LPF: 12 /LPF (ref 0–2)
IRON SATN MFR SERPL: 42 % (ref 15–46)
IRON SERPL-MCNC: 105 UG/DL (ref 35–180)
KETONES UR STRIP-MCNC: NEGATIVE MG/DL
LDLC SERPL CALC-MCNC: 35 MG/DL
LEUKOCYTE ESTERASE UR QL STRIP: NEGATIVE
MCH RBC QN AUTO: 31.4 PG (ref 26.5–33)
MCHC RBC AUTO-ENTMCNC: 33.2 G/DL (ref 31.5–36.5)
MCV RBC AUTO: 95 FL (ref 78–100)
MUCOUS THREADS #/AREA URNS LPF: PRESENT /LPF
NITRATE UR QL: NEGATIVE
NONHDLC SERPL-MCNC: 95 MG/DL
PH UR STRIP: 5 PH (ref 5–7)
PHOSPHATE SERPL-MCNC: 3 MG/DL (ref 2.5–4.5)
PLATELET # BLD AUTO: 115 10E9/L (ref 150–450)
POTASSIUM SERPL-SCNC: 4.7 MMOL/L (ref 3.4–5.3)
PROT SERPL-MCNC: 7.1 G/DL (ref 6.8–8.8)
PROT UR-MCNC: 0.3 G/L
PROT/CREAT 24H UR: 0.28 G/G CR (ref 0–0.2)
PTH-INTACT SERPL-MCNC: 29 PG/ML (ref 18–80)
RBC # BLD AUTO: 3.85 10E12/L (ref 4.4–5.9)
RBC #/AREA URNS AUTO: 1 /HPF (ref 0–2)
SODIUM SERPL-SCNC: 138 MMOL/L (ref 133–144)
SOURCE: ABNORMAL
SP GR UR STRIP: 1.01 (ref 1–1.03)
TIBC SERPL-MCNC: 249 UG/DL (ref 240–430)
TRIGL SERPL-MCNC: 298 MG/DL
UROBILINOGEN UR STRIP-MCNC: NORMAL MG/DL (ref 0–2)
WBC # BLD AUTO: 7.1 10E9/L (ref 4–11)
WBC #/AREA URNS AUTO: <1 /HPF (ref 0–5)

## 2020-11-24 PROCEDURE — 84450 TRANSFERASE (AST) (SGOT): CPT | Performed by: INTERNAL MEDICINE

## 2020-11-24 PROCEDURE — 82306 VITAMIN D 25 HYDROXY: CPT | Performed by: INTERNAL MEDICINE

## 2020-11-24 PROCEDURE — 84156 ASSAY OF PROTEIN URINE: CPT | Performed by: INTERNAL MEDICINE

## 2020-11-24 PROCEDURE — 83036 HEMOGLOBIN GLYCOSYLATED A1C: CPT | Performed by: INTERNAL MEDICINE

## 2020-11-24 PROCEDURE — 82248 BILIRUBIN DIRECT: CPT | Performed by: INTERNAL MEDICINE

## 2020-11-24 PROCEDURE — 36415 COLL VENOUS BLD VENIPUNCTURE: CPT | Performed by: INTERNAL MEDICINE

## 2020-11-24 PROCEDURE — 80061 LIPID PANEL: CPT | Performed by: INTERNAL MEDICINE

## 2020-11-24 PROCEDURE — 84460 ALANINE AMINO (ALT) (SGPT): CPT | Performed by: INTERNAL MEDICINE

## 2020-11-24 PROCEDURE — 83540 ASSAY OF IRON: CPT | Performed by: INTERNAL MEDICINE

## 2020-11-24 PROCEDURE — 81001 URINALYSIS AUTO W/SCOPE: CPT | Performed by: INTERNAL MEDICINE

## 2020-11-24 PROCEDURE — 84075 ASSAY ALKALINE PHOSPHATASE: CPT | Performed by: INTERNAL MEDICINE

## 2020-11-24 PROCEDURE — 83550 IRON BINDING TEST: CPT | Performed by: INTERNAL MEDICINE

## 2020-11-24 PROCEDURE — 80197 ASSAY OF TACROLIMUS: CPT | Performed by: INTERNAL MEDICINE

## 2020-11-24 PROCEDURE — 80069 RENAL FUNCTION PANEL: CPT | Performed by: INTERNAL MEDICINE

## 2020-11-24 PROCEDURE — 83970 ASSAY OF PARATHORMONE: CPT | Performed by: INTERNAL MEDICINE

## 2020-11-24 PROCEDURE — 85027 COMPLETE CBC AUTOMATED: CPT | Performed by: INTERNAL MEDICINE

## 2020-11-24 PROCEDURE — 84155 ASSAY OF PROTEIN SERUM: CPT | Performed by: INTERNAL MEDICINE

## 2020-11-24 PROCEDURE — 82247 BILIRUBIN TOTAL: CPT | Performed by: INTERNAL MEDICINE

## 2020-11-24 PROCEDURE — 82728 ASSAY OF FERRITIN: CPT | Performed by: INTERNAL MEDICINE

## 2020-11-25 LAB
DEPRECATED CALCIDIOL+CALCIFEROL SERPL-MC: 40 UG/L (ref 20–75)
TACROLIMUS BLD-MCNC: 5.3 UG/L (ref 5–15)
TME LAST DOSE: NORMAL H

## 2020-12-02 ENCOUNTER — VIRTUAL VISIT (OUTPATIENT)
Dept: GASTROENTEROLOGY | Facility: CLINIC | Age: 55
End: 2020-12-02
Attending: INTERNAL MEDICINE
Payer: COMMERCIAL

## 2020-12-02 VITALS
BODY MASS INDEX: 32.82 KG/M2 | WEIGHT: 242 LBS | OXYGEN SATURATION: 98 % | HEART RATE: 54 BPM | TEMPERATURE: 98.1 F | SYSTOLIC BLOOD PRESSURE: 139 MMHG | DIASTOLIC BLOOD PRESSURE: 77 MMHG

## 2020-12-02 DIAGNOSIS — Z94.4 LIVER REPLACED BY TRANSPLANT (H): Primary | ICD-10-CM

## 2020-12-02 PROCEDURE — 99214 OFFICE O/P EST MOD 30 MIN: CPT | Mod: 95 | Performed by: INTERNAL MEDICINE

## 2020-12-02 ASSESSMENT — PAIN SCALES - GENERAL: PAINLEVEL: MODERATE PAIN (5)

## 2020-12-02 NOTE — PROGRESS NOTES
"Pt states he does not have a computer with camera and the camera on his phone is broken.    Camacho Bhagat is a 55 year old male who is being evaluated via a billable telephone visit.      The patient has been notified of following:     \"This telephone visit will be conducted via a call between you and your physician/provider. We have found that certain health care needs can be provided without the need for a physical exam.  This service lets us provide the care you need with a short phone conversation.  If a prescription is necessary we can send it directly to your pharmacy.  If lab work is needed we can place an order for that and you can then stop by our lab to have the test done at a later time.    Telephone visits are billed at different rates depending on your insurance coverage. During this emergency period, for some insurers they may be billed the same as an in-person visit.  Please reach out to your insurance provider with any questions.    If during the course of the call the physician/provider feels a telephone visit is not appropriate, you will not be charged for this service.\"    Patient has given verbal consent for Telephone visit?  Yes    What phone number would you like to be contacted at? [phone]    How would you like to obtain your AVS? [facility]    I had the pleasure of seeing Camacho Bhagat for followup in the Liver Transplant Clinic at the [facility] on 06/03/2020.  Mr. Bhagat returns for followup now more than 3 and 1/2 years status post liver transplantation for hepatocellular carcinoma.      He is doing well at this visit.  He denies any abdominal pain, itching or skin rash or fatigue.  He denies any increased abdominal girth or lower extremity edema.  He denies any fevers or chills, cough or shortness of breath.  He denies any nausea or vomiting, diarrhea or constipation.  His appetite has been good.  His weight has increased about while sheltering in place. "      His only complaint he really has at this visit is bilateral hip pain.  It is fairly constantly.  It does get worse when he is standing for longer periods of time.  He did discuss this with Dr. Kirkpatrick and will begin an evaluation by an orthopedic surgeon.      Current Outpatient Medications   Medication     alcohol swab prep pads     alpha-lipoic acid 600 MG capsule     amLODIPine (NORVASC) 10 MG tablet     blood glucose (NO BRAND SPECIFIED) test strip     blood glucose calibration (NO BRAND SPECIFIED) solution     blood glucose monitoring (NO BRAND SPECIFIED) meter device kit     cholecalciferol (VITAMIN D3) 1000 UNIT tablet     CIALIS 5 MG tablet     cyclobenzaprine (FLEXERIL) 10 MG tablet     fluticasone (FLONASE) 50 MCG/ACT nasal spray     furosemide (LASIX) 40 MG tablet     gabapentin (NEURONTIN) 300 MG capsule     Glucose Blood (BLOOD GLUCOSE TEST STRIPS) STRP     insulin glargine (LANTUS SOLOSTAR) 100 UNIT/ML pen     Insulin Lispro (HUMALOG KWIKPEN) 200 UNIT/ML soln     insulin pen needle (BD ENE U/F) 32G X 4 MM miscellaneous     metoprolol succinate ER (TOPROL-XL) 200 MG 24 hr tablet     multivitamin, therapeutic with minerals (MULTI-VITAMIN) TABS tablet     mycophenolic acid (GENERIC EQUIVALENT) 360 MG EC tablet     omeprazole (PRILOSEC) 20 MG DR capsule     predniSONE (DELTASONE) 2.5 MG tablet     tacrolimus (GENERIC EQUIVALENT) 1 MG capsule     thin (NO BRAND SPECIFIED) lancets     ursodiol (ACTIGALL) 500 MG tablet     oxyCODONE (ROXICODONE) 5 MG tablet     patiromer (VELTASSA) 8.4 g packet     No current facility-administered medications for this visit.      On physical examination, he sounds well.  His affect was appropriate.    His most recent laboratory tests were from November 24 and showed his white count was 7.1 and hemoglobin was 12.1.  His sodium was 138, potassium 4.7, BUN is 43 and creatinine 1.42.  His AST is 23, ALT is 39 and alkaline phosphatase is 238.  His albumin is 3.6 with a total  protein of 7.1 and total bilirubin is 0.7    My impression is that Mr. Bhagat is now more than 3 and 1/2 years status post liver transplantation.  He was transplanted for hepatocellular carcinoma.  There is no evidence of any recurrence at this point in time.  His kidney function is stable.  His liver function is also stable.  I will not be making any change to his medical regimen.  I will see him back in the clinic again in 6 months.      We did discuss COVID-19 and I pointed out that he is at increased risk for more severe disease.  He should be considered early for the vaccination.     Thank you very much for allowing me to participate in the care of this patient.  If you have any questions regarding my recommendations, please do not hesitate to contact me.         Toñito Camejo MD      Professor of Medicine  [employer]      Executive Medical Director, Solid Organ Transplant Program  [facility]     Phone call duration: 15 minutes

## 2020-12-02 NOTE — LETTER
"    12/2/2020         RE: Camacho Bhagat  [address] [facility] [address]        Dear Colleague,    Thank you for referring your patient, Camacho Bhagat, to the [facility] [place]. Please see a copy of my visit note below.    Pt states he does not have a computer with camera and the camera on his phone is broken.    Camacho Bhagat is a 55 year old male who is being evaluated via a billable telephone visit.      The patient has been notified of following:     \"This telephone visit will be conducted via a call between you and your physician/provider. We have found that certain health care needs can be provided without the need for a physical exam.  This service lets us provide the care you need with a short phone conversation.  If a prescription is necessary we can send it directly to your pharmacy.  If lab work is needed we can place an order for that and you can then stop by our lab to have the test done at a later time.    Telephone visits are billed at different rates depending on your insurance coverage. During this emergency period, for some insurers they may be billed the same as an in-person visit.  Please reach out to your insurance provider with any questions.    If during the course of the call the physician/provider feels a telephone visit is not appropriate, you will not be charged for this service.\"    Patient has given verbal consent for Telephone visit?  Yes    What phone number would you like to be contacted at? [phone]    How would you like to obtain your AVS? [facility]    I had the pleasure of seeing Camacho Bhagat for followup in the Liver Transplant Clinic at the [facility] on 06/03/2020.  Mr. Bhagat returns for followup now more than 3 and 1/2 years status post liver transplantation for hepatocellular carcinoma.      He is doing well at this visit.  He denies any abdominal pain, itching or skin rash or fatigue.  He denies any " increased abdominal girth or lower extremity edema.  He denies any fevers or chills, cough or shortness of breath.  He denies any nausea or vomiting, diarrhea or constipation.  His appetite has been good.  His weight has increased about while sheltering in place.      His only complaint he really has at this visit is bilateral hip pain.  It is fairly constantly.  It does get worse when he is standing for longer periods of time.  He did discuss this with Dr. Kirkpatrick and will begin an evaluation by an orthopedic surgeon.      Current Outpatient Medications   Medication     alcohol swab prep pads     alpha-lipoic acid 600 MG capsule     amLODIPine (NORVASC) 10 MG tablet     blood glucose (NO BRAND SPECIFIED) test strip     blood glucose calibration (NO BRAND SPECIFIED) solution     blood glucose monitoring (NO BRAND SPECIFIED) meter device kit     cholecalciferol (VITAMIN D3) 1000 UNIT tablet     CIALIS 5 MG tablet     cyclobenzaprine (FLEXERIL) 10 MG tablet     fluticasone (FLONASE) 50 MCG/ACT nasal spray     furosemide (LASIX) 40 MG tablet     gabapentin (NEURONTIN) 300 MG capsule     Glucose Blood (BLOOD GLUCOSE TEST STRIPS) STRP     insulin glargine (LANTUS SOLOSTAR) 100 UNIT/ML pen     Insulin Lispro (HUMALOG KWIKPEN) 200 UNIT/ML soln     insulin pen needle (BD ENE U/F) 32G X 4 MM miscellaneous     metoprolol succinate ER (TOPROL-XL) 200 MG 24 hr tablet     multivitamin, therapeutic with minerals (MULTI-VITAMIN) TABS tablet     mycophenolic acid (GENERIC EQUIVALENT) 360 MG EC tablet     omeprazole (PRILOSEC) 20 MG DR capsule     predniSONE (DELTASONE) 2.5 MG tablet     tacrolimus (GENERIC EQUIVALENT) 1 MG capsule     thin (NO BRAND SPECIFIED) lancets     ursodiol (ACTIGALL) 500 MG tablet     oxyCODONE (ROXICODONE) 5 MG tablet     patiromer (VELTASSA) 8.4 g packet     No current facility-administered medications for this visit.      On physical examination, he sounds well.  His affect was appropriate.    His most  recent laboratory tests were from November 24 and showed his white count was 7.1 and hemoglobin was 12.1.  His sodium was 138, potassium 4.7, BUN is 43 and creatinine 1.42.  His AST is 23, ALT is 39 and alkaline phosphatase is 238.  His albumin is 3.6 with a total protein of 7.1 and total bilirubin is 0.7    My impression is that Mr. Bhagat is now more than 3 and 1/2 years status post liver transplantation.  He was transplanted for hepatocellular carcinoma.  There is no evidence of any recurrence at this point in time.  His kidney function is stable.  His liver function is also stable.  I will not be making any change to his medical regimen.  I will see him back in the clinic again in 6 months.      We did discuss COVID-19 and I pointed out that he is at increased risk for more severe disease.  He should be considered early for the vaccination.     Thank you very much for allowing me to participate in the care of this patient.  If you have any questions regarding my recommendations, please do not hesitate to contact me.         Toñito Camejo MD      Professor of Medicine  [employer]      Executive Medical Director, Solid Organ Transplant Program  [facility]     Phone call duration: 15 minutes

## 2020-12-03 ENCOUNTER — MYC REFILL (OUTPATIENT)
Dept: NEPHROLOGY | Facility: CLINIC | Age: 55
End: 2020-12-03

## 2020-12-03 DIAGNOSIS — E87.5 HYPERKALEMIA: ICD-10-CM

## 2020-12-04 ENCOUNTER — MYC REFILL (OUTPATIENT)
Dept: NEPHROLOGY | Facility: CLINIC | Age: 55
End: 2020-12-04

## 2020-12-04 DIAGNOSIS — E87.5 HYPERKALEMIA: ICD-10-CM

## 2020-12-07 ENCOUNTER — VIRTUAL VISIT (OUTPATIENT)
Dept: INTERNAL MEDICINE | Facility: CLINIC | Age: 55
End: 2020-12-07
Payer: COMMERCIAL

## 2020-12-07 DIAGNOSIS — M15.0 PRIMARY OSTEOARTHRITIS INVOLVING MULTIPLE JOINTS: Primary | ICD-10-CM

## 2020-12-07 DIAGNOSIS — N18.31 STAGE 3A CHRONIC KIDNEY DISEASE (H): ICD-10-CM

## 2020-12-07 DIAGNOSIS — Z94.4 LIVER TRANSPLANT RECIPIENT (H): ICD-10-CM

## 2020-12-07 DIAGNOSIS — Z79.4 TYPE 2 DIABETES MELLITUS WITH DIABETIC POLYNEUROPATHY, WITH LONG-TERM CURRENT USE OF INSULIN (H): ICD-10-CM

## 2020-12-07 DIAGNOSIS — N18.30 CKD (CHRONIC KIDNEY DISEASE) STAGE 3, GFR 30-59 ML/MIN (H): Primary | ICD-10-CM

## 2020-12-07 DIAGNOSIS — E11.42 TYPE 2 DIABETES MELLITUS WITH DIABETIC POLYNEUROPATHY, WITH LONG-TERM CURRENT USE OF INSULIN (H): ICD-10-CM

## 2020-12-07 PROCEDURE — 99214 OFFICE O/P EST MOD 30 MIN: CPT | Mod: 95 | Performed by: INTERNAL MEDICINE

## 2020-12-07 RX ORDER — OXYCODONE HYDROCHLORIDE 5 MG/1
5 TABLET ORAL EVERY 4 HOURS PRN
Qty: 30 TABLET | Refills: 0 | Status: SHIPPED | OUTPATIENT
Start: 2020-12-07 | End: 2021-02-24

## 2020-12-07 NOTE — NURSING NOTE
Chief Complaint   Patient presents with     Recheck Medication     follow up     Kimberly Nissen, EMT at 10:03 AM on 12/7/2020

## 2020-12-07 NOTE — PROGRESS NOTES
"The patient has been notified of following:     \"This telephone visit will be conducted via a call between you and your physician/provider. We have found that certain health care needs can be provided without the need for a physical exam.  This service lets us provide the care you need with a short phone conversation.  If a prescription is necessary we can send it directly to your pharmacy.  If lab work is needed we can place an order for that and you can then stop by our lab to have the test done at a later time.    If during the course of the call the physician/provider feels a telephone visit is not appropriate, you will not be charged for this service.\"     Chief Complaint   Patient presents with     Recheck Medication     follow up       I have reviewed and updated the patient's Past Medical History, Social History, Family History and Medication List.  Allergies were reviewed with patient.    Start Time: 10:33  Stop Time:10:47  Duration:14 min  Additional time:    HPI:    Camacho is a 55 year old male with history of liver transplant for hepatocellular carcinoma, hypertension, CKD, type II DM, and arthritis who presents today for the following concerns:     1. Arthritis   Camacho states that he is having significant arthritis that is getting worse. He endorses pain located throughout his body in his hands, back, neck, knees, feet, and hips. He worked as a , which he believes contributed to his symptoms. He takes 1-2 pills of oxycodone a week for pain. He cannot use NSAIDs due to CKD. He has tried heat and ice, but declines them having any effect. He recently saw sports medicine one to two months ago and received steroid injections in both hips. Camacho states this significantly helped his pain. Plans to follow-up with sports medicine when pain in hip worsens.     2. Liver Transplant  Had follow-up with gastroenterology one week ago. At this time, liver labs looked great with no concerns.     3. Type II " Diabetes Mellitus   Camacho saw endocrinology about 3 months ago. At this time, his evening Lantus was increased due to high morning fasting sugars. Since this visit, he states his fasting sugars have been in the 190-200s, which Camacho states is an improvement since increasing evening lantus. His daytime sugars usually are between 120-135, but can reach to the 160s-170s.     4. 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 His blood pressure has been well-controled on amlodipine, furosemide, and metoprolol with systolic pressures in the 130s-140s and diastolicpressures in the 70s-80s. States his last labs showed CKD has improved from stage III to stage II. Has follow-up appointment with nephrology this week.     Objective:   Exam limited due to phone visit.     General: no acute distress   Resp: patient is comfortable on room air with no coughing   Psych: appropriate mood     Assessment and Plan:      1. Arthritis   -due to limited ability to use NSAIDs, plan to refill oxycodone for pain   -discussed referral to pain clinic; at this time Camacho is not interested but will call back if changes his mind  -follow-up with sports medicine for hip injections when steroids wear off  -will schedule appt with podiatry for foot pain    2. Liver Transplant  -continue follow-up with GI     3. Type II DM   -despite high fasting sugars, appears well-controlled with last A1C of 6.2. No changes to be made at this time.   -follow-up with endocrine as scheduled     4. CKD  -follow-up with nephrology this week to discuss blood pressure control      Cecilia Griffith, MS3   Shay Kirkpatrick MD, FACP    I, Shay Kirkpatrick, was present with the medical student who participated in the service and in the documentation of the note.  I have verified the history and personally performed the physical exam and medical decision making.  I agree with the assessment and plan of care as documented in the note.

## 2020-12-09 ENCOUNTER — VIRTUAL VISIT (OUTPATIENT)
Dept: NEPHROLOGY | Facility: CLINIC | Age: 55
End: 2020-12-09
Payer: COMMERCIAL

## 2020-12-09 DIAGNOSIS — D63.1 ANEMIA OF CHRONIC RENAL FAILURE, STAGE 3A (H): ICD-10-CM

## 2020-12-09 DIAGNOSIS — N18.31 ANEMIA OF CHRONIC RENAL FAILURE, STAGE 3A (H): ICD-10-CM

## 2020-12-09 DIAGNOSIS — N18.31 STAGE 3A CHRONIC KIDNEY DISEASE (H): Primary | ICD-10-CM

## 2020-12-09 DIAGNOSIS — I10 ESSENTIAL HYPERTENSION: ICD-10-CM

## 2020-12-09 PROCEDURE — 99214 OFFICE O/P EST MOD 30 MIN: CPT | Mod: 95

## 2020-12-09 RX ORDER — METOPROLOL SUCCINATE 200 MG/1
200 TABLET, EXTENDED RELEASE ORAL DAILY
Qty: 90 TABLET | Refills: 3 | Status: SHIPPED | OUTPATIENT
Start: 2020-12-09 | End: 2021-12-27

## 2020-12-09 NOTE — PROGRESS NOTES
Nephrology Telephone Visit 12/9/20    Assessment and Plan:       1. CKD2 w/mild proteinuria - Relatively stable. Creat 1.4, eGFR 55 ml/mn. UPCR 0.2 g/gCr.    Baseline remains in the low to mid 1 range.    Etiology of CKD likely multifactorial; DM, recurrent EJ, CNI.    - Had been intolerant of ACE/ARB previously given problems with hyperkalemia assoc with Tacrolimus, but retrial when TAC level was <0.3 did not result in Hyperkalemia. In fact Valtessa had been discontinued because he was becoming hypokalemic.    - Patient was restarted on ARB in 4/18 for unexplained nephrotic range proteinuria following ostomy takedown with improvement in UPCR, but then developed mild acute rejection and Tac dose was increased resulting in hyperkalemia. He was restarted on Valtassa with improvement in hyperkalemia and Losartan was discontinued again in 3/19   - Potassium has been controlled since 4/19 off Losartan. UPCR with only mild proteinuria.    - Blood pressure goal < 140/80.    - Does not use NSAIDs.    - A1c goal is 7%. HgbA1c 6.4% (11/20)      2. HTN - Improved. Home blood pressures 130-140/. No edema/dyspnea.      Current antihypertensive regimen:      Lasix 40 mg bid     Amlodipine 10 mg qd      Toprol  mg every day     - Continue to monitor blood pressures       3. Hyperkalemia assoc with Tac/ARB - K 4.7 and has been acceptable since stopping Losartan in March 2019.    - Currently on Valtassa 8.4 g/day.    - He tries to modify his diet w/respect to K foods      4. Volume status - Denies edema/dyspnea. Weight 242#. His weight gain is not assoc with hypervolemia. Was 220# in 12/18. Blood pressures acceptable.    - Continue Lasix 40 mg bid.       5. Acid base - No acute concerns. Metabolic acidosis resolved following ileostomy takedown. Bicarb 26      6. BMD - Ca 8.8, Phos 3.0, albumin 3.6   - Vitamin D 40, PTH 29 (11/20)    - Continue Vit D 1000 U qd      7.Anemia -Hgb 12.1.    - Iron studies 11/20: Ferritin 1104, Fe  105, IS 42    - Had colonoscopy 2017 (poor study), Ileoscopy 8/17 - nml   - Not on iron and has not needed DIPAK      8. Liver transplant IS: Tacrolimus, MMF, Pred. Tac level 5.3      9. Dispo- RCT 6 months for f/u.       Reason for Visit:  CKD2/3/HTN follow up        HPI:  Mr Bhagat is a 56 yo male with CKD2, HTN, Chronic hyperkalemia, Liver transplant, present today for CKD/HTN followup. Last seen in clinic by me on 5/19/20.   Baseline creat low to mid 1's.       Interval Hx:     No hospital admissions.       ROS:  No acute concerns.   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 Not taking NSAIDs  Home blood pressures 130-140/75-80   Appetite and energy are good  No edema.   Denies dyspnea,  CP, abdominal pain. Voiding w/o difficulty.          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 Former smoker,   by profession. Exercising regularly.  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     Cervical Cancer Maternal Grandmother      Cerebrovascular Disease Maternal Grandfather      No Known Problems Paternal Grandmother      Prostate Cancer Paternal Grandfather      Colon Cancer No family hx of      Hyperlipidemia No family hx of      Coronary Artery Disease No family hx of      Breast Cancer  No family hx of        Allergies:  Allergies   Allergen Reactions     Erythromycin GI Disturbance     Losartan Other (See Comments)     Consistently develops hyperkalemia     Vioxx      Nausea, vomiting       Medications:  Current Outpatient Medications   Medication Sig     metoprolol succinate ER (TOPROL-XL) 200 MG 24 hr tablet Take 1 tablet (200 mg) by mouth daily     alcohol swab prep pads Use to swab area of injection/francisca as directed.     alpha-lipoic acid 600 MG capsule Take 600 mg by mouth     amLODIPine (NORVASC) 10 MG tablet Take 1 tablet (10 mg) by mouth daily     blood glucose (NO BRAND SPECIFIED) test strip Use to test blood sugar 3 times daily or as directed. Any covered brand preferred by Pt that works with meter.     blood glucose calibration (NO BRAND SPECIFIED) solution Use to calibrate meter.     blood glucose monitoring (NO BRAND SPECIFIED) meter device kit Use to test blood sugar 3 times daily or as directed. Any covered brand preferred by Pt.     cholecalciferol (VITAMIN D3) 1000 UNIT tablet Take 1 tablet (1,000 Units) by mouth daily (Patient taking differently: Take 1,000 Units by mouth every morning )     CIALIS 5 MG tablet Take 5 mg by mouth as needed      cyclobenzaprine (FLEXERIL) 10 MG tablet Take 1 tablet (10 mg) by mouth 3 times daily as needed for muscle spasms     fluticasone (FLONASE) 50 MCG/ACT nasal spray Spray 1 spray into both nostrils daily     furosemide (LASIX) 40 MG tablet Take 1 tablet (40 mg) by mouth 2 times daily     gabapentin (NEURONTIN) 300 MG capsule Take 1 capsule (300 mg) by mouth 3 times daily     Glucose Blood (BLOOD GLUCOSE TEST STRIPS) STRP 1 strip by Lancet route 3 times daily     insulin glargine (LANTUS SOLOSTAR) 100 UNIT/ML pen Inject 55 Units Subcutaneous every morning     Insulin Lispro (HUMALOG KWIKPEN) 200 UNIT/ML soln Use one unit per 3 grams carbohydrates for all meals and snacks. Total daily dose up to 60 U.     insulin pen needle (BD ENE U/F) 32G X  4 MM miscellaneous Use 1 pen needle 4 times daily or as directed.     multivitamin, therapeutic with minerals (MULTI-VITAMIN) TABS tablet Take 1 tablet by mouth every morning     mycophenolic acid (GENERIC EQUIVALENT) 360 MG EC tablet Take 2 tablets (720 mg) by mouth every 12 hours     omeprazole (PRILOSEC) 20 MG DR capsule Take 1 capsule (20 mg) by mouth daily     oxyCODONE (ROXICODONE) 5 MG tablet Take 1 tablet (5 mg) by mouth every 4 hours as needed for pain maximum 6 tablet(s) per day     patiromer (VELTASSA) 8.4 g packet Take 8.4 g by mouth daily     predniSONE (DELTASONE) 2.5 MG tablet Take 1 tablet (2.5 mg) by mouth daily     tacrolimus (GENERIC EQUIVALENT) 1 MG capsule Take 5 capsules (5 mg) by mouth every 12 hours     thin (NO BRAND SPECIFIED) lancets Use with lanceting device. Any covered brand.     ursodiol (ACTIGALL) 500 MG tablet Take 1 tablet (500 mg) by mouth 2 times daily     No current facility-administered medications for this visit.       Vitals:  Wt 242#     Exam:    No exam performed    LABS:   CMP  Recent Labs   Lab Test 11/24/20  1345 10/06/20  1255 07/20/20  1240 07/02/20  1255    132* 137 139   POTASSIUM 4.7 5.3 4.5 5.3   CHLORIDE 109 104 109 112*   CO2 26 20 22 23   ANIONGAP 3 8 6 4   GLC 66* 493* 147* 112*   BUN 43* 55* 54* 49*   CR 1.42* 1.74* 1.47* 1.42*   GFRESTIMATED 55* 43* 53* 55*   GFRESTBLACK 63 50* 61 64   JACOB 8.8 9.3 9.0 9.0     Recent Labs   Lab Test 11/24/20  1345 10/06/20  1255 07/02/20  1255 05/08/20  1247   BILITOTAL 0.7 0.5 0.7 0.8   ALKPHOS 238* 267* 273* 283*   ALT 39 36 31 40   AST 23 17 21 25     CBC  Recent Labs   Lab Test 11/24/20  1345 10/06/20  1255 07/02/20  1255 05/08/20  1247   HGB 12.1* 12.3* 12.3* 12.0*   WBC 7.1 13.9* 5.8 5.7   RBC 3.85* 4.04* 4.00* 3.92*   HCT 36.4* 37.7* 37.4* 36.6*   MCV 95 93 94 93   MCH 31.4 30.4 30.8 30.6   MCHC 33.2 32.6 32.9 32.8   RDW 13.5 13.4 13.3 13.5   * 108* 101* 100*     URINE STUDIES  Recent Labs   Lab Test  11/24/20  1325 04/02/20  1230 03/27/19  1155 04/11/18  1446   COLOR Yellow Light Yellow Yellow Yellow   APPEARANCE Clear Clear Clear Slightly Cloudy   URINEGLC Negative Negative 70* >499*   URINEBILI Negative Negative Negative Negative   URINEKETONE Negative Negative Negative Negative   SG 1.014 1.014 1.014 1.010   UBLD Negative Negative Negative Small*   URINEPH 5.0 5.0 5.0 5.0   PROTEIN 20* 10* 30* 100*   NITRITE Negative Negative Negative Negative   LEUKEST Negative Negative Negative Negative   RBCU 1 <1 1 3*   WBCU <1 <1 0 <1     Recent Labs   Lab Test 11/24/20  1325 05/08/20  1225 04/02/20  1230 01/31/20  1220   UTPG 0.28* 0.41* 0.34* 0.28*     PTH  Recent Labs   Lab Test 11/24/20  1345 08/06/19  1234 10/10/18  1059   PTHI 29 22 22     IRON STUDIES  Recent Labs   Lab Test 11/24/20  1345 08/21/20  1510 07/10/18  1022   IRON 105 123 166    235* 227*   IRONSAT 42 52* 73*   NELY 1,104* 1,098* 1,338*       Cinda Cazares, NP

## 2020-12-09 NOTE — PROGRESS NOTES
"Camacho Bhagat is a 55 year old male who is being evaluated via a billable telephone visit.      The patient has been notified of following:     \"This telephone visit will be conducted via a call between you and your physician/provider. We have found that certain health care needs can be provided without the need for a physical exam.  This service lets us provide the care you need with a short phone conversation.  If a prescription is necessary we can send it directly to your pharmacy.  If lab work is needed we can place an order for that and you can then stop by our lab to have the test done at a later time.    Telephone visits are billed at different rates depending on your insurance coverage. During this emergency period, for some insurers they may be billed the same as an in-person visit.  Please reach out to your insurance provider with any questions.    If during the course of the call the physician/provider feels a telephone visit is not appropriate, you will not be charged for this service.\"    Patient has given verbal consent for Telephone visit? YES    Phone call duration: 15 minutes    Cinda Foster CNP      "

## 2020-12-13 PROBLEM — N18.30 CHRONIC KIDNEY DISEASE, STAGE 3 (H): Status: ACTIVE | Noted: 2020-12-13

## 2020-12-14 ENCOUNTER — OFFICE VISIT (OUTPATIENT)
Dept: PODIATRY | Facility: CLINIC | Age: 55
End: 2020-12-14
Payer: COMMERCIAL

## 2020-12-14 VITALS
SYSTOLIC BLOOD PRESSURE: 152 MMHG | HEIGHT: 72 IN | DIASTOLIC BLOOD PRESSURE: 92 MMHG | WEIGHT: 242 LBS | BODY MASS INDEX: 32.78 KG/M2

## 2020-12-14 DIAGNOSIS — M79.671 BILATERAL FOOT PAIN: ICD-10-CM

## 2020-12-14 DIAGNOSIS — Q66.70 CAVUS DEFORMITY OF FOOT: Primary | ICD-10-CM

## 2020-12-14 DIAGNOSIS — M79.672 BILATERAL FOOT PAIN: ICD-10-CM

## 2020-12-14 PROCEDURE — 99243 OFF/OP CNSLTJ NEW/EST LOW 30: CPT | Performed by: PODIATRIST

## 2020-12-14 ASSESSMENT — MIFFLIN-ST. JEOR: SCORE: 1965.7

## 2020-12-14 NOTE — LETTER
"    12/14/2020         RE: Camacho hBagat  [address] [facility] [address]        Dear Colleague,    Thank you for referring your patient, Camacho Bhagat, to the [facility]. Please see a copy of my visit note below.    Foot & Ankle Surgery  December 14, 2020    CC: \"foot pain\"    I was asked to see Camacho MOORE Olayinka regarding the chief complaint by:  Dr. Kirkpatrick/Dr Mcleod    HPI:  Pt is a 55 year old male who presents with above complaint.  Bilateral lower extremity issues x 1 1/2 years.  \"luck of pain\".  Burning, deep ache, throbbing, tingling.  Pain 6/10 \"every day\", worse with \"walking\".  Wearing shoes abnormally, on outside, after liver transplant and colon surgery Feb 2019, as well as hip pain.  Has Steward inserts, 1+ years old, helped, but not any longer.  Sees Neurology for neuropathy.      ROS:   Pos for CC.  The patient denies current nausea, vomiting, chills, fevers, belly pain, calf pain, chest pain or SOB.  Complete remainder of ROS is otherwise neg.    VITALS:    Vitals:    12/14/20 1546   BP: (!) 152/92   Weight: 109.8 kg (242 lb)   Height: 1.829 m (6')       PMH:    Past Medical History:   Diagnosis Date     Depressive disorder, not elsewhere classified      Diabetes type 2, controlled (H) 11/10/2016     Esophageal reflux      Fibromyalgia 1/2009    dx with Dr Benitez( Rheum)     Gangrene of finger (H) 8/25/2017     H/O deep venous thrombosis 11/2001    Permanent IVC filter in place.     H/O CASTAÑEDA (nonalcoholic steatohepatitis)      H/O Pneumonia, organism unspecified(486) 10/2001    Included ARDS, sepsis, and  acute renal failure; hospitalized, required tracheostomy placement.     H/O: HTN (hypertension) 11/2001    No longer prescribed antihypertensive medication.       History of CVA (cerebrovascular accident)      History of hepatocellular carcinoma      History of liver transplant (H)      History of obstructive sleep apnea     No longer wears CPAP since " losing approximately 200 pounds with his liver transplant and its complications.       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     Osteoarthritis      Presence of PERMANENT IVC filter      Rheumatoid arthritis(714.0)        SXHX:    Past Surgical History:   Procedure Laterality Date     BENCH LIVER N/A 3/4/2017    Procedure: BENCH LIVER;  Surgeon: Jovan Tran MD;  [facility]     C TOTAL KNEE ARTHROPLASTY  2008    Right knee arthroscopy     CHOLECYSTECTOMY       COLONOSCOPY N/A 7/21/2017    Procedure: COMBINED COLONOSCOPY, SINGLE OR MULTIPLE BIOPSY/POLYPECTOMY BY BIOPSY;  Colonoscopy;  Surgeon: Izaiah Montes MD;  Location:  GI     ENDOSCOPIC RETROGRADE CHOLANGIOPANCREATOGRAM N/A 5/22/2018    Procedure: COMBINED ENDOSCOPIC RETROGRADE CHOLANGIOPANCREATOGRAPHY, PLACE TUBE/STENT;  Endoscopic Retrograde Cholangiopancreatography with sphincterotomy and pancreatic duct stent placement;  Surgeon: Zay Gaitan MD;  [facility]     ENT SURGERY      Repair of deviated septum     ESOPHAGOSCOPY, GASTROSCOPY, DUODENOSCOPY (EGD), COMBINED N/A 8/4/2016    Procedure: COMBINED ESOPHAGOSCOPY, GASTROSCOPY, DUODENOSCOPY (EGD), BIOPSY SINGLE OR MULTIPLE;  Surgeon: Trent Pederson MD;  Location:  GI     ESOPHAGOSCOPY, GASTROSCOPY, DUODENOSCOPY (EGD), COMBINED N/A 8/27/2017    Procedure: COMBINED ESOPHAGOSCOPY, GASTROSCOPY, DUODENOSCOPY (EGD);;  Surgeon: Los Wynn MD;  Location:  GI     INCISION AND DRAINAGE ABDOMEN WASHOUT, COMBINED Right 2/14/2018    Procedure: COMBINED INCISION AND DRAINAGE ABDOMEN WASHOUT;  COMBINED INCISION AND DRAINAGE right ABDOMEN flank;  Surgeon: Sara Dinh MD;  [facility]     LAPAROTOMY EXPLORATORY N/A 7/4/2017    Procedure: LAPAROTOMY EXPLORATORY;  Exploratory  Laparotomy, washout;  Surgeon: Tip Zhang MD;  [facility]     LAPAROTOMY EXPLORATORY N/A 2017    Procedure: LAPAROTOMY EXPLORATORY;  Exploratory Laparotomy, Washout with closure.;  Surgeon: Tip Zhang MD;  [facility]     LAPAROTOMY EXPLORATORY N/A 2017    Procedure: LAPAROTOMY EXPLORATORY;  Exploratory Laparotomy, Right angelique-colectomy, end ileostomy, mucosal fistula, partial omentectomy;  Surgeon: Sara Dinh MD;  [facility]     ORTHOPEDIC SURGERY Right     Repair of dislocated wrist.       RELEASE TRIGGER FINGER Right 11/10/2017    Procedure: RELEASE TRIGGER FINGER;  Debride, V-Y Flap Right Index Finger;  Surgeon: Momo Noonan MD;  [facility]     TAKEDOWN ILEOSTOMY N/A 2018    Procedure: TAKEDOWN ILEOSTOMY;  Exploratory Laparotomy, Lysis of Adhesions, Takedown Of End Ileostomy, Takedown of mucocutaneous fistula, ileocolic resection  And Colorectal Anastomosis, Primary repair of Ventral Hernia, Anesthesia Block ;  Surgeon: Sara Dinh MD;  [facility]     TRACHEOSTOMY  2001    During hospitalization for pneumonia.       TRANSPLANT LIVER RECIPIENT  DONOR N/A 3/4/2017    Procedure: TRANSPLANT LIVER RECIPIENT  DONOR;  Surgeon: Jovan Tran MD;  Location: [facility]        MEDS:    Current Outpatient Medications   Medication     alcohol swab prep pads     alpha-lipoic acid 600 MG capsule     amLODIPine (NORVASC) 10 MG tablet     blood glucose (NO BRAND SPECIFIED) test strip     blood glucose calibration (NO BRAND SPECIFIED) solution     blood glucose monitoring (NO BRAND SPECIFIED) meter device kit     cholecalciferol (VITAMIN D3) 1000 UNIT tablet     CIALIS 5 MG tablet     cyclobenzaprine (FLEXERIL) 10 MG tablet     fluticasone (FLONASE) 50 MCG/ACT nasal spray     furosemide (LASIX) 40 MG tablet     gabapentin (NEURONTIN) 300 MG capsule     Glucose Blood (BLOOD GLUCOSE TEST STRIPS) STRP     insulin  glargine (LANTUS SOLOSTAR) 100 UNIT/ML pen     Insulin Lispro (HUMALOG KWIKPEN) 200 UNIT/ML soln     insulin pen needle (BD ENE U/F) 32G X 4 MM miscellaneous     metoprolol succinate ER (TOPROL-XL) 200 MG 24 hr tablet     multivitamin, therapeutic with minerals (MULTI-VITAMIN) TABS tablet     mycophenolic acid (GENERIC EQUIVALENT) 360 MG EC tablet     omeprazole (PRILOSEC) 20 MG DR capsule     oxyCODONE (ROXICODONE) 5 MG tablet     patiromer (VELTASSA) 8.4 g packet     predniSONE (DELTASONE) 2.5 MG tablet     tacrolimus (GENERIC EQUIVALENT) 1 MG capsule     thin (NO BRAND SPECIFIED) lancets     ursodiol (ACTIGALL) 500 MG tablet     No current facility-administered medications for this visit.        ALL:     Allergies   Allergen Reactions     Erythromycin GI Disturbance     Losartan Other (See Comments)     Consistently develops hyperkalemia     Vioxx      Nausea, vomiting       FMH:    Family History   Problem Relation Age of Onset     Diabetes Father      Hypertension Father      Substance Abuse Father      Arthritis Mother      Thyroid Cancer Mother         Survivor!     Cervical Cancer Maternal Grandmother      Cerebrovascular Disease Maternal Grandfather      No Known Problems Paternal Grandmother      Prostate Cancer Paternal Grandfather      Colon Cancer No family hx of      Hyperlipidemia No family hx of      Coronary Artery Disease No family hx of      Breast Cancer No family hx of        SocHx:    Social History     Socioeconomic History     Marital status:      Spouse name: Not on file     Number of children: 1     Years of education: Not on file     Highest education level: Not on file   Occupational History     Occupation:     Social Needs     Financial resource strain: Not on file     Food insecurity     Worry: Not on file     Inability: Not on file     Transportation needs     Medical: Not on file     Non-medical: Not on file   Tobacco Use     Smoking status: Former Smoker      Packs/day: 0.25     Years: 2.00     Pack years: 0.50     Types: Cigarettes     Quit date:      Years since quittin.9     Smokeless tobacco: Former User     Types: Chew     Quit date: 10/8/2015     Tobacco comment: 1 Cigar once every other month.   Substance and Sexual Activity     Alcohol use: No     Alcohol/week: 0.0 standard drinks     Comment: No alcohol since liver transplant.       Drug use: No     Sexual activity: Not Currently     Partners: Female   Lifestyle     Physical activity     Days per week: Not on file     Minutes per session: Not on file     Stress: Not on file   Relationships     Social connections     Talks on phone: Not on file     Gets together: Not on file     Attends Denominational service: Not on file     Active member of club or organization: Not on file     Attends meetings of clubs or organizations: Not on file     Relationship status: Not on file     Intimate partner violence     Fear of current or ex partner: Not on file     Emotionally abused: Not on file     Physically abused: Not on file     Forced sexual activity: Not on file   Other Topics Concern     Parent/sibling w/ CABG, MI or angioplasty before 65F 55M? 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               EXAMINATION:  Gen:   No apparent distress  Neuro:   A&Ox3, no deficits  Psych:    Answering questions appropriately for age and situation with normal affect  Head:    NCAT  Eye:    Visual scanning without deficit  Ear:    Response to auditory stimuli wnl  Lung:    Non-labored breathing on RA noted  Abd:    NTND per patient report  Lymph:    Neg for pitting/non-pitting edema BLE  Vasc:    Pulses palpable, CFT minimally delayed  Neuro:    Light touch sensation intact to all sensory nerve distributions without paresthesias  Derm:    Neg for nodules, lesions or ulcerations  MSK:    Mild cavus foot structure.  Heel-toe gait bilateral with R foot externally  rotated on gait evaluation.  Tender lateral foot and plantar forefoot.  Atrophied plantar fat pad forefoot bilateral   Calf:    Neg for redness, swelling or tenderness      Assessment:  55 year old male with cavus foot structure, right foot external rotation during gait cycle, with lateral foot and plantar forefoot pain with atrophied plantar fat pad      Plan:  Discussed etiologies, anatomy and options  1.  cavus foot structure, right foot external rotation during gait cycle, with lateral foot and plantar forefoot pain with atrophied plantar fat pad  -discussed gait cycle.  Typically, the heel touches in a slightly inverted/supinated position, with gradual pronation-supination during the stance phase.  So postero-lateral wear on a shoe is not uncommon.  However, his shoes are quite broken down along the entire sole.  -OTC insert handout; he will consider Orthotic Lab referral and call/MyChart   -RICE/NSAID vs  Tylenol prn based on pain levels  -regarding shoe gear, make decisions simply based on comfort levels rather than brand, model, etc  -continue with Neurology for neuropathy treatment    Follow up:  prn or sooner with acute issues      Patient's medical history was reviewed today      Ascencion Gorman DPM FACFAS FACFAOM  Podiatric Foot & Ankle Surgeon  [facility]  [phone]          Again, thank you for allowing me to participate in the care of your patient.        Sincerely,        Ascencion Gorman DPM, AYO

## 2020-12-14 NOTE — PATIENT INSTRUCTIONS
Thank you for choosing [facility] / Foot & Ankle Surgery!    DR. GAONA'S CLINIC LOCATIONS:   [facility]   [address]   [place] [address]                        [place] [address]   [phone]      SET UP SURGERY: [phone]   APPOINTMENTS: [phone]   BILLING QUESTIONS: [phone]   FAX NUMBER: [phone]     Follow up: as needed        Please read through the following handouts and if you have any questions, please feel free to call us or send a [employer] message!    [facility] LOCATIONS  [facility]  [address]  [place] [address]  Phone: [phone]  Fax: [phone] [facility]  [address]  [place] [address]  Phone: [phone]   Fax: [phone]   [facility]  [address]  [place] [address]  Phone: [phone]  Fax: [phone] [facility]  [address]  [place] [address]  Phone: [phone]   Fax: [phone]   [facility]   [address]  [place] [address]  Phone: [phone]  Fax: [phone] * Please call any location listed to make an appointment for a casting/fitting. Your referral was sent to their central office and they will all have the order on file.     OVER THE COUNTER INSERTS    Most of these can be found at your local [employer], [employer], sporting [employer], or online:    [employer]   Sofsole Fit Spenco   Power Step   Walk-Fit ([employer])  *For heel pain* Arch Cradles  *For heel pain*       ** A good high quality over the counter insert should cost around $40-$50    ** Capsulitis/Metarasalgia - try adding a metatarsal pad on your inserts or find an insert with one built in              Camacho to follow up  with Primary Care provider regarding elevated blood pressure. (only if 140/90 or more)    (BMI) Body Mass Index  Many things can cause foot and ankle problems. Foot structure, activity level, foot mechanics and injuries are common causes of pain.One very important issue that often goes unmentioned, is body weight.  Extra weight can cause increased stress on muscles, ligaments, bones and tendons. Sometimes just a few extra pounds is all it takes to put one over her/his threshold. Without reducing that stress, it can be difficult to alleviate pain. Some people are uncomfortable addressing this issue, but we feel it is important for you to think about it. As Foot & Ankle specialists, our job is addressing the lower extremity problem and possible causes. Regarding extra body weight, we encourage patients to discuss diet and weight management plans with their primary care doctors. It is this team approach that gives you the best opportunity for pain relief and getting you back on your feet.

## 2020-12-16 ENCOUNTER — TELEPHONE (OUTPATIENT)
Dept: TRANSPLANT | Facility: CLINIC | Age: 55
End: 2020-12-16

## 2020-12-16 NOTE — TELEPHONE ENCOUNTER
Pt said he did talk with Zainab but he prefers to talk with Gisselle  Would not go into detail of why

## 2020-12-18 NOTE — PROGRESS NOTES
"Foot & Ankle Surgery  December 14, 2020    CC: \"foot pain\"    I was asked to see Camacho Bhagat regarding the chief complaint by:  Dr. Kirkpatrick/Dr Mcleod    HPI:  Pt is a 55 year old male who presents with above complaint.  Bilateral lower extremity issues x 1 1/2 years.  \"luck of pain\".  Burning, deep ache, throbbing, tingling.  Pain 6/10 \"every day\", worse with \"walking\".  Wearing shoes abnormally, on outside, after liver transplant and colon surgery Feb 2019, as well as hip pain.  Has Silva inserts, 1+ years old, helped, but not any longer.  Sees Neurology for neuropathy.      ROS:   Pos for CC.  The patient denies current nausea, vomiting, chills, fevers, belly pain, calf pain, chest pain or SOB.  Complete remainder of ROS is otherwise neg.    VITALS:    Vitals:    12/14/20 1546   BP: (!) 152/92   Weight: 109.8 kg (242 lb)   Height: 1.829 m (6')       PMH:    Past Medical History:   Diagnosis Date     Depressive disorder, not elsewhere classified      Diabetes type 2, controlled (H) 11/10/2016     Esophageal reflux      Fibromyalgia 1/2009    dx with Dr Benitez( Rhe[facility])     Gangrene of finger (H) 8/25/2017     H/O deep venous thrombosis 11/2001    Permanent IVC filter in place.     H/O CASTAÑEDA (nonalcoholic steatohepatitis)      H/O Pneumonia, organism unspecified(486) 10/2001    Included ARDS, sepsis, and  acute renal failure; hospitalized, required tracheostomy placement.     H/O: HTN (hypertension) 11/2001    No longer prescribed antihypertensive medication.       History of CVA (cerebrovascular accident)      History of hepatocellular carcinoma      History of liver transplant (H)      History of obstructive sleep apnea     No longer wears CPAP since losing approximately 200 pounds with his liver transplant and its complications.       HLD (hyperlipidemia)      Ischemia of both lower extremities 8/25/2017    Distal ischemia due to shock/high pressor requirements     Liver transplant rejection (H) 6/11/2018 "     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     Osteoarthritis      Presence of PERMANENT IVC filter      Rheumatoid arthritis(714.0)        SXHX:    Past Surgical History:   Procedure Laterality Date     BENCH LIVER N/A 3/4/2017    Procedure: BENCH LIVER;  Surgeon: Jovan Tran MD;  Location: [facility]     C TOTAL KNEE ARTHROPLASTY  2008    Right knee arthroscopy     CHOLECYSTECTOMY       COLONOSCOPY N/A 7/21/2017    Procedure: COMBINED COLONOSCOPY, SINGLE OR MULTIPLE BIOPSY/POLYPECTOMY BY BIOPSY;  Colonoscopy;  Surgeon: Izaiah Montes MD;  Location: [facility] GI     ENDOSCOPIC RETROGRADE CHOLANGIOPANCREATOGRAM N/A 5/22/2018    Procedure: COMBINED ENDOSCOPIC RETROGRADE CHOLANGIOPANCREATOGRAPHY, PLACE TUBE/STENT;  Endoscopic Retrograde Cholangiopancreatography with sphincterotomy and pancreatic duct stent placement;  Surgeon: Zay Gaitan MD;  Location: [facility] OR     ENT SURGERY      Repair of deviated septum     ESOPHAGOSCOPY, GASTROSCOPY, DUODENOSCOPY (EGD), COMBINED N/A 8/4/2016    Procedure: COMBINED ESOPHAGOSCOPY, GASTROSCOPY, DUODENOSCOPY (EGD), BIOPSY SINGLE OR MULTIPLE;  Surgeon: Trent Pederson MD;  Location:  GI     ESOPHAGOSCOPY, GASTROSCOPY, DUODENOSCOPY (EGD), COMBINED N/A 8/27/2017    Procedure: COMBINED ESOPHAGOSCOPY, GASTROSCOPY, DUODENOSCOPY (EGD);;  Surgeon: Los Wynn MD;  Location: [facility]     INCISION AND DRAINAGE ABDOMEN WASHOUT, COMBINED Right 2/14/2018    Procedure: COMBINED INCISION AND DRAINAGE ABDOMEN WASHOUT;  COMBINED INCISION AND DRAINAGE right ABDOMEN flank;  Surgeon: Sara Dinh MD;  Location: [facility]     LAPAROTOMY EXPLORATORY N/A 7/4/2017    Procedure: LAPAROTOMY EXPLORATORY;  Exploratory Laparotomy, washout;  Surgeon: Tip Zhang MD;  [facility]: [facility]     LAPAROTOMY EXPLORATORY N/A 7/5/2017    Procedure: LAPAROTOMY EXPLORATORY;  Exploratory Laparotomy, Washout with closure.;  Surgeon: Tip Zhang MD;  [facility]: [facility]     LAPAROTOMY  EXPLORATORY N/A 2017    Procedure: LAPAROTOMY EXPLORATORY;  Exploratory Laparotomy, Right angelique-colectomy, end ileostomy, mucosal fistula, partial omentectomy;  Surgeon: Sara Dinh MD;  Location: [facility]     ORTHOPEDIC SURGERY Right     Repair of dislocated wrist.       RELEASE TRIGGER FINGER Right 11/10/2017    Procedure: RELEASE TRIGGER FINGER;  Debride, V-Y Flap Right Index Finger;  Surgeon: Momo Noonan MD;  [facility]     TAKEDOWN ILEOSTOMY N/A 2018    Procedure: TAKEDOWN ILEOSTOMY;  Exploratory Laparotomy, Lysis of Adhesions, Takedown Of End Ileostomy, Takedown of mucocutaneous fistula, ileocolic resection  And Colorectal Anastomosis, Primary repair of Ventral Hernia, Anesthesia Block ;  Surgeon: Sara Dinh MD;  Location: [facility]     TRACHEOSTOMY  2001    During hospitalization for pneumonia.       TRANSPLANT LIVER RECIPIENT  DONOR N/A 3/4/2017    Procedure: TRANSPLANT LIVER RECIPIENT  DONOR;  Surgeon: Jovan Tran MD;  Location: [facility]        MEDS:    Current Outpatient Medications   Medication     alcohol swab prep pads     alpha-lipoic acid 600 MG capsule     amLODIPine (NORVASC) 10 MG tablet     blood glucose (NO BRAND SPECIFIED) test strip     blood glucose calibration (NO BRAND SPECIFIED) solution     blood glucose monitoring (NO BRAND SPECIFIED) meter device kit     cholecalciferol (VITAMIN D3) 1000 UNIT tablet     CIALIS 5 MG tablet     cyclobenzaprine (FLEXERIL) 10 MG tablet     fluticasone (FLONASE) 50 MCG/ACT nasal spray     furosemide (LASIX) 40 MG tablet     gabapentin (NEURONTIN) 300 MG capsule     Glucose Blood (BLOOD GLUCOSE TEST STRIPS) STRP     insulin glargine (LANTUS SOLOSTAR) 100 UNIT/ML pen     Insulin Lispro (HUMALOG KWIKPEN) 200 UNIT/ML soln     insulin pen needle (BD ENE U/F) 32G X 4 MM miscellaneous     metoprolol succinate ER (TOPROL-XL) 200 MG 24 hr tablet     multivitamin, therapeutic with minerals  (MULTI-VITAMIN) TABS tablet     mycophenolic acid (GENERIC EQUIVALENT) 360 MG EC tablet     omeprazole (PRILOSEC) 20 MG DR capsule     oxyCODONE (ROXICODONE) 5 MG tablet     patiromer (VELTASSA) 8.4 g packet     predniSONE (DELTASONE) 2.5 MG tablet     tacrolimus (GENERIC EQUIVALENT) 1 MG capsule     thin (NO BRAND SPECIFIED) lancets     ursodiol (ACTIGALL) 500 MG tablet     No current facility-administered medications for this visit.        ALL:     Allergies   Allergen Reactions     Erythromycin GI Disturbance     Losartan Other (See Comments)     Consistently develops hyperkalemia     Vioxx      Nausea, vomiting       FMH:    Family History   Problem Relation Age of Onset     Diabetes Father      Hypertension Father      Substance Abuse Father      Arthritis Mother      Thyroid Cancer Mother         Survivor!     Cervical Cancer Maternal Grandmother      Cerebrovascular Disease Maternal Grandfather      No Known Problems Paternal Grandmother      Prostate Cancer Paternal Grandfather      Colon Cancer No family hx of      Hyperlipidemia No family hx of      Coronary Artery Disease No family hx of      Breast Cancer No family hx of        SocHx:    Social History     Socioeconomic History     Marital status:      Spouse name: Not on file     Number of children: 1     Years of education: Not on file     Highest education level: Not on file   Occupational History     Occupation:     Social Needs     Financial resource strain: Not on file     Food insecurity     Worry: Not on file     Inability: Not on file     Transportation needs     Medical: Not on file     Non-medical: Not on file   Tobacco Use     Smoking status: Former Smoker     Packs/day: 0.25     Years: 2.00     Pack years: 0.50     Types: Cigarettes     Quit date:      Years since quittin.9     Smokeless tobacco: Former User     Types: Chew     Quit date: 10/8/2015     Tobacco comment: 1 Cigar once every other month.    Substance and Sexual Activity     Alcohol use: No     Alcohol/week: 0.0 standard drinks     Comment: No alcohol since liver transplant.       Drug use: No     Sexual activity: Not Currently     Partners: Female   Lifestyle     Physical activity     Days per week: Not on file     Minutes per session: Not on file     Stress: Not on file   Relationships     Social connections     Talks on phone: Not on file     Gets together: Not on file     Attends Zoroastrian service: Not on file     Active member of club or organization: Not on file     Attends meetings of clubs or organizations: Not on file     Relationship status: Not on file     Intimate partner violence     Fear of current or ex partner: Not on file     Emotionally abused: Not on file     Physically abused: Not on file     Forced sexual activity: Not on file   Other Topics Concern     Parent/sibling w/ CABG, MI or angioplasty before 65F 55M? 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               EXAMINATION:  Gen:   No apparent distress  Neuro:   A&Ox3, no deficits  Psych:    Answering questions appropriately for age and situation with normal affect  Head:    NCAT  Eye:    Visual scanning without deficit  Ear:    Response to auditory stimuli wnl  Lung:    Non-labored breathing on RA noted  Abd:    NTND per patient report  Lymph:    Neg for pitting/non-pitting edema BLE  Vasc:    Pulses palpable, CFT minimally delayed  Neuro:    Light touch sensation intact to all sensory nerve distributions without paresthesias  Derm:    Neg for nodules, lesions or ulcerations  MSK:    Mild cavus foot structure.  Heel-toe gait bilateral with R foot externally rotated on gait evaluation.  Tender lateral foot and plantar forefoot.  Atrophied plantar fat pad forefoot bilateral   Calf:    Neg for redness, swelling or tenderness      Assessment:  55 year old male with cavus foot structure, right foot external rotation  during gait cycle, with lateral foot and plantar forefoot pain with atrophied plantar fat pad      Plan:  Discussed etiologies, anatomy and options  1.  cavus foot structure, right foot external rotation during gait cycle, with lateral foot and plantar forefoot pain with atrophied plantar fat pad  -discussed gait cycle.  Typically, the heel touches in a slightly inverted/supinated position, with gradual pronation-supination during the stance phase.  So postero-lateral wear on a shoe is not uncommon.  However, his shoes are quite broken down along the entire sole.  -OTC insert handout; he will consider Orthotic Lab referral and call/MyChart   -RICE/NSAID vs  Tylenol prn based on pain levels  -regarding shoe gear, make decisions simply based on comfort levels rather than brand, model, etc  -continue with Neurology for neuropathy treatment    Follow up:  prn or sooner with acute issues      Patient's medical history was reviewed today      Ascencion Gorman DPM FACFAS FACFAOM  Podiatric Foot & Ankle Surgeon  [facility]  [phone]

## 2020-12-23 DIAGNOSIS — E11.21 TYPE 2 DIABETES MELLITUS WITH DIABETIC NEPHROPATHY, WITH LONG-TERM CURRENT USE OF INSULIN (H): ICD-10-CM

## 2020-12-23 DIAGNOSIS — Z79.4 TYPE 2 DIABETES MELLITUS WITH DIABETIC NEPHROPATHY, WITH LONG-TERM CURRENT USE OF INSULIN (H): ICD-10-CM

## 2020-12-23 RX ORDER — INSULIN LISPRO 200 [IU]/ML
INJECTION, SOLUTION SUBCUTANEOUS
Qty: 60 ML | Refills: 3 | Status: SHIPPED | OUTPATIENT
Start: 2020-12-23 | End: 2021-06-07

## 2020-12-23 NOTE — TELEPHONE ENCOUNTER
Insulin Lispro (HUMALOG KWIKPEN) 200 UNIT/ML soln  Last Written Prescription Date:  5/11/2020  Last Fill Quantity: 60,   # refills: 3  Last Office Visit : 9/14/2020  Future Office visit:  4/5/2021  Routing refill request to provider for review/approval because:  Drug not on the [facility], [facility] or [facility] refill protocol or controlled substance      Alisa Vega RN  Central Triage Red Flags/Med Refills

## 2021-01-15 ENCOUNTER — HEALTH MAINTENANCE LETTER (OUTPATIENT)
Age: 56
End: 2021-01-15

## 2021-02-02 DIAGNOSIS — Z79.60 LONG-TERM USE OF IMMUNOSUPPRESSANT MEDICATION: ICD-10-CM

## 2021-02-02 DIAGNOSIS — Z94.4 LIVER REPLACED BY TRANSPLANT (H): ICD-10-CM

## 2021-02-02 RX ORDER — PREDNISONE 2.5 MG/1
2.5 TABLET ORAL DAILY
Qty: 90 TABLET | Refills: 3 | Status: SHIPPED | OUTPATIENT
Start: 2021-02-02 | End: 2022-01-20

## 2021-02-02 NOTE — TELEPHONE ENCOUNTER
Patient Call: Medication Refill  Route to LPN  Instruct the patient to first contact their pharmacy. If they have called their pharmacy and require further assistance, route to LPN.    Pharmacy Name: [facility]  Pharmacy Location: [place]  Name of Medication: Prednisone Dose: 2.5 mg  90 day with refills   When will the patient be out of this medication?: Less than 24 hours (Dolores FLOYD, then page if no answer)

## 2021-02-06 ENCOUNTER — TELEPHONE (OUTPATIENT)
Dept: TRANSPLANT | Facility: CLINIC | Age: 56
End: 2021-02-06

## 2021-02-06 NOTE — TELEPHONE ENCOUNTER
Pt called to report that he his daughter tested COVID positive on Friday. He was with her on Wednesday and she did have some symptoms.   Pt does have a Runny nose, but he reports that he has had a runny nose since he was transplanted.     Pt does have an appt with [facility] today for COVID testing. Pt will updated tx office with his results.

## 2021-02-12 ENCOUNTER — TELEPHONE (OUTPATIENT)
Dept: TRANSPLANT | Facility: CLINIC | Age: 56
End: 2021-02-12

## 2021-02-12 ENCOUNTER — HOSPITAL ENCOUNTER (OUTPATIENT)
Dept: LAB | Facility: CLINIC | Age: 56
Discharge: HOME | End: 2021-02-12
Attending: INTERNAL MEDICINE
Payer: COMMERCIAL

## 2021-02-12 DIAGNOSIS — Z79.4 TYPE 2 DIABETES MELLITUS WITH DIABETIC POLYNEUROPATHY, WITH LONG-TERM CURRENT USE OF INSULIN (H): ICD-10-CM

## 2021-02-12 DIAGNOSIS — Z13.220 LIPID SCREENING: ICD-10-CM

## 2021-02-12 DIAGNOSIS — N18.30 CKD (CHRONIC KIDNEY DISEASE) STAGE 3, GFR 30-59 ML/MIN (H): ICD-10-CM

## 2021-02-12 DIAGNOSIS — Z94.4 LIVER REPLACED BY TRANSPLANT (H): ICD-10-CM

## 2021-02-12 DIAGNOSIS — E11.42 TYPE 2 DIABETES MELLITUS WITH DIABETIC POLYNEUROPATHY, WITH LONG-TERM CURRENT USE OF INSULIN (H): ICD-10-CM

## 2021-02-12 LAB
ALBUMIN SERPL-MCNC: 3.6 G/DL (ref 3.4–5)
ALP SERPL-CCNC: 231 U/L (ref 40–150)
ALT SERPL W P-5'-P-CCNC: 36 U/L (ref 0–70)
ANION GAP SERPL CALCULATED.3IONS-SCNC: 5 MMOL/L (ref 3–14)
AST SERPL W P-5'-P-CCNC: 20 U/L (ref 0–45)
BILIRUB DIRECT SERPL-MCNC: 0.2 MG/DL (ref 0–0.2)
BILIRUB SERPL-MCNC: 0.7 MG/DL (ref 0.2–1.3)
BUN SERPL-MCNC: 44 MG/DL (ref 7–30)
CALCIUM SERPL-MCNC: 8.7 MG/DL (ref 8.5–10.1)
CHLORIDE SERPL-SCNC: 111 MMOL/L (ref 94–109)
CO2 SERPL-SCNC: 22 MMOL/L (ref 20–32)
CREAT SERPL-MCNC: 1.32 MG/DL (ref 0.66–1.25)
ERYTHROCYTE [DISTWIDTH] IN BLOOD BY AUTOMATED COUNT: 13.5 % (ref 10–15)
GFR SERPL CREATININE-BSD FRML MDRD: 60 ML/MIN/{1.73_M2}
GLUCOSE SERPL-MCNC: 251 MG/DL (ref 70–99)
HBA1C MFR BLD: 6.7 % (ref 0–5.6)
HCT VFR BLD AUTO: 35.7 % (ref 40–53)
HGB BLD-MCNC: 11.7 G/DL (ref 13.3–17.7)
MCH RBC QN AUTO: 30.8 PG (ref 26.5–33)
MCHC RBC AUTO-ENTMCNC: 32.8 G/DL (ref 31.5–36.5)
MCV RBC AUTO: 94 FL (ref 78–100)
PHOSPHATE SERPL-MCNC: 2.7 MG/DL (ref 2.5–4.5)
PLATELET # BLD AUTO: 96 10E9/L (ref 150–450)
POTASSIUM SERPL-SCNC: 5.8 MMOL/L (ref 3.4–5.3)
PROT SERPL-MCNC: 6.9 G/DL (ref 6.8–8.8)
RBC # BLD AUTO: 3.8 10E12/L (ref 4.4–5.9)
SODIUM SERPL-SCNC: 138 MMOL/L (ref 133–144)
TACROLIMUS BLD-MCNC: 5.6 UG/L (ref 5–15)
TME LAST DOSE: NORMAL H
WBC # BLD AUTO: 5.8 10E9/L (ref 4–11)

## 2021-02-12 PROCEDURE — 82247 BILIRUBIN TOTAL: CPT

## 2021-02-12 PROCEDURE — 80069 RENAL FUNCTION PANEL: CPT

## 2021-02-12 PROCEDURE — 83036 HEMOGLOBIN GLYCOSYLATED A1C: CPT

## 2021-02-12 PROCEDURE — 84155 ASSAY OF PROTEIN SERUM: CPT

## 2021-02-12 PROCEDURE — 80197 ASSAY OF TACROLIMUS: CPT | Performed by: INTERNAL MEDICINE

## 2021-02-12 PROCEDURE — 85027 COMPLETE CBC AUTOMATED: CPT

## 2021-02-12 PROCEDURE — 36415 COLL VENOUS BLD VENIPUNCTURE: CPT

## 2021-02-12 PROCEDURE — 84075 ASSAY ALKALINE PHOSPHATASE: CPT

## 2021-02-12 PROCEDURE — 84450 TRANSFERASE (AST) (SGOT): CPT

## 2021-02-12 PROCEDURE — 84460 ALANINE AMINO (ALT) (SGPT): CPT

## 2021-02-12 PROCEDURE — 82248 BILIRUBIN DIRECT: CPT

## 2021-02-12 NOTE — TELEPHONE ENCOUNTER
Called Camacho to review his elevated potassium level today. Camacho said this is a trend for him and he is followed by nephrology who has prescribed Veltessa which he takes daily to keep potassium low. He knows he ate high potassium foods over the weekend and will limit his potassium intake and continue his Veltessa. He is aware high potassium can cause cardiac arrhythmia's and if he experiences any new cardiac symptoms will go to the ED. Dr. Camejo notified.

## 2021-02-15 ENCOUNTER — TELEPHONE (OUTPATIENT)
Dept: TRANSPLANT | Facility: CLINIC | Age: 56
End: 2021-02-15

## 2021-02-15 DIAGNOSIS — E87.5 HYPERKALEMIA: Primary | ICD-10-CM

## 2021-02-15 NOTE — TELEPHONE ENCOUNTER
Pt calls to let writer know that pt was out of Veltessa for about 1 1/2 months so that is why K+ level is high. Pt will now restart the Veltessa and plan to get a K+ check in one month. FYI.

## 2021-02-19 DIAGNOSIS — I10 BENIGN ESSENTIAL HYPERTENSION: ICD-10-CM

## 2021-02-19 DIAGNOSIS — E87.5 HYPERKALEMIA: ICD-10-CM

## 2021-02-19 DIAGNOSIS — R80.1 PERSISTENT PROTEINURIA: ICD-10-CM

## 2021-02-19 RX ORDER — FUROSEMIDE 40 MG
40 TABLET ORAL 2 TIMES DAILY
Qty: 180 TABLET | Refills: 3 | Status: SHIPPED | OUTPATIENT
Start: 2021-02-19 | End: 2022-05-27

## 2021-02-23 NOTE — PROGRESS NOTES
COLON AND RECTAL SURGERY PROGRESS NOTE    SUBJECTIVE: Extubated yesterday.  Abdominal pain reasonably controlled.  Confused.  Asking where daughter is this morning.  No nausea or vomiting.  Febrile overnight.     OBJECTIVE:  /73  Pulse 90  Temp 100  F (37.8  C) (Axillary)  Resp 20  Ht 1.829 m (6')  Wt 98.2 kg (216 lb 7.9 oz)  SpO2 95%  BMI 29.36 kg/m2  I/O last 3 completed shifts:  In: 3101.92 [I.V.:1611.92; NG/GT:690]  Out: 6990 [Urine:2645; Drains:970; Stool:3375]    Awake, alert, confused  RRR  Coarse b/l  Soft, distended, tender R>L  Incision c/d/i  Drain: ascites  Stoma: + gas and effluent in bag  No c/c/e    Labs: WBC 6.3, Hgb 8.1, Cr 1.5  Rest of labs reviewed    Cultures: Negative to date, peritoneal cultures negative    ASSESSMENT/PLAN:  51 year old male POD 3 s/p right hemicolectomy, EI + MF     - Stoma working  - Output very high - TF have fiber in them already  - Can slowly start imodium from CRS standpoint (1 tab today)  - Continue drain * 5 days post op   - Abx per ID   - Fever work-up in progress  - IS per transplant    Keenan Ramirez MD  Colorectal Fellow  Pager     Attending addendum: seen and examined,discussed with Dr. Solano,  agree with above note.       no

## 2021-02-24 ENCOUNTER — MYC REFILL (OUTPATIENT)
Dept: INTERNAL MEDICINE | Facility: CLINIC | Age: 56
End: 2021-02-24

## 2021-02-24 DIAGNOSIS — M15.0 PRIMARY OSTEOARTHRITIS INVOLVING MULTIPLE JOINTS: ICD-10-CM

## 2021-02-25 RX ORDER — OXYCODONE HYDROCHLORIDE 5 MG/1
5 TABLET ORAL EVERY 4 HOURS PRN
Qty: 30 TABLET | Refills: 0 | Status: SHIPPED | OUTPATIENT
Start: 2021-02-25 | End: 2021-05-27

## 2021-02-25 NOTE — TELEPHONE ENCOUNTER
Reason For Call:        Chief Complaint   Patient presents with     Refill Request                 Medication Name, Dose and Monthly Quantity:   oxyCODONE (ROXICODONE) 5 MG tablet    Diagnosis requiring opiates:   Primary osteoarthritis involving multiple joints      Problem List Updated:   Yes     Opioid Agreement On File - [facility] PAIN CONTRACT ID# [id number]:       Last Urine Drug Screen (at least once every 12 months) Date:     Unexpected Results:        [place]  Data Reviewed (at least once every 3 months) Date:   06/03/19  Unexpected Results:         Last Fill Date:   12/07/2020  Next Fill Date:   02/25/2021  Last Visit with PCP:   12/07/2020    Future Visits with PCP:    Nothing scheduled  Processing:     RO BONNER CMA at 7:26 AM on 2/25/2021.

## 2021-03-04 ENCOUNTER — TELEPHONE (OUTPATIENT)
Dept: TRANSPLANT | Facility: CLINIC | Age: 56
End: 2021-03-04

## 2021-03-04 NOTE — TELEPHONE ENCOUNTER
Patient Call: General  Route to LPN    Reason for call: connect with pt regarding covid vaccine was offered the sadi and sadi but want to make sure it was okay     Appointment is tomorrow at 12:45pm    Call back needed? Yes    Return Call Needed  Same as documented in contacts section  When to return call?: Greater than one day: Route standard priority

## 2021-03-04 NOTE — TELEPHONE ENCOUNTER
Informed pt that our SOT department prefers Moderna or Pfizer vaccines. Pt will cancel this vaccine then.

## 2021-03-11 ENCOUNTER — IMMUNIZATION (OUTPATIENT)
Dept: NURSING | Facility: CLINIC | Age: 56
End: 2021-03-11
Payer: COMMERCIAL

## 2021-03-11 PROCEDURE — 91300 PR COVID VAC PFIZER DIL RECON 30 MCG/0.3 ML IM: CPT

## 2021-03-11 PROCEDURE — 0001A PR COVID VAC PFIZER DIL RECON 30 MCG/0.3 ML IM: CPT

## 2021-03-18 ENCOUNTER — TELEPHONE (OUTPATIENT)
Dept: TRANSPLANT | Facility: CLINIC | Age: 56
End: 2021-03-18

## 2021-03-18 NOTE — TELEPHONE ENCOUNTER
Camacho got a job at [employer] and needs record of his immunizations. He will print them off his [employer] records. Nothing else needed from his liver coordinator today.

## 2021-03-26 ENCOUNTER — TELEPHONE (OUTPATIENT)
Dept: TRANSPLANT | Facility: CLINIC | Age: 56
End: 2021-03-26

## 2021-03-26 NOTE — TELEPHONE ENCOUNTER
Annual chart review completed and Camacho is due for a dermatology visit for annual skin check. [employer] message sent to Camacho to notify.

## 2021-03-31 ENCOUNTER — HOSPITAL ENCOUNTER (OUTPATIENT)
Dept: LAB | Facility: CLINIC | Age: 56
Discharge: HOME | End: 2021-03-31
Attending: INTERNAL MEDICINE | Admitting: INTERNAL MEDICINE
Payer: COMMERCIAL

## 2021-03-31 DIAGNOSIS — Z13.220 LIPID SCREENING: ICD-10-CM

## 2021-03-31 DIAGNOSIS — Z79.4 TYPE 2 DIABETES MELLITUS WITH DIABETIC POLYNEUROPATHY, WITH LONG-TERM CURRENT USE OF INSULIN (H): ICD-10-CM

## 2021-03-31 DIAGNOSIS — Z94.4 LIVER REPLACED BY TRANSPLANT (H): ICD-10-CM

## 2021-03-31 DIAGNOSIS — E87.5 HYPERKALEMIA: ICD-10-CM

## 2021-03-31 DIAGNOSIS — E11.42 TYPE 2 DIABETES MELLITUS WITH DIABETIC POLYNEUROPATHY, WITH LONG-TERM CURRENT USE OF INSULIN (H): ICD-10-CM

## 2021-03-31 LAB
ALBUMIN SERPL-MCNC: 3.9 G/DL (ref 3.4–5)
ALP SERPL-CCNC: 220 U/L (ref 40–150)
ALT SERPL W P-5'-P-CCNC: 34 U/L (ref 0–70)
ANION GAP SERPL CALCULATED.3IONS-SCNC: 7 MMOL/L (ref 3–14)
AST SERPL W P-5'-P-CCNC: 20 U/L (ref 0–45)
BILIRUB DIRECT SERPL-MCNC: 0.2 MG/DL (ref 0–0.2)
BILIRUB SERPL-MCNC: 0.8 MG/DL (ref 0.2–1.3)
BUN SERPL-MCNC: 54 MG/DL (ref 7–30)
CALCIUM SERPL-MCNC: 8.9 MG/DL (ref 8.5–10.1)
CHLORIDE SERPL-SCNC: 109 MMOL/L (ref 94–109)
CO2 SERPL-SCNC: 22 MMOL/L (ref 20–32)
CREAT SERPL-MCNC: 1.56 MG/DL (ref 0.66–1.25)
ERYTHROCYTE [DISTWIDTH] IN BLOOD BY AUTOMATED COUNT: 13.5 % (ref 10–15)
GFR SERPL CREATININE-BSD FRML MDRD: 49 ML/MIN/{1.73_M2}
GLUCOSE SERPL-MCNC: 134 MG/DL (ref 70–99)
HBA1C MFR BLD: 6.3 % (ref 0–5.6)
HCT VFR BLD AUTO: 36.1 % (ref 40–53)
HGB BLD-MCNC: 12.2 G/DL (ref 13.3–17.7)
MCH RBC QN AUTO: 31 PG (ref 26.5–33)
MCHC RBC AUTO-ENTMCNC: 33.8 G/DL (ref 31.5–36.5)
MCV RBC AUTO: 92 FL (ref 78–100)
PLATELET # BLD AUTO: 116 10E9/L (ref 150–450)
POTASSIUM SERPL-SCNC: 4.5 MMOL/L (ref 3.4–5.3)
PROT SERPL-MCNC: 7.2 G/DL (ref 6.8–8.8)
RBC # BLD AUTO: 3.93 10E12/L (ref 4.4–5.9)
SODIUM SERPL-SCNC: 138 MMOL/L (ref 133–144)
TACROLIMUS BLD-MCNC: 5.6 UG/L (ref 5–15)
TME LAST DOSE: NORMAL H
WBC # BLD AUTO: 7 10E9/L (ref 4–11)

## 2021-03-31 PROCEDURE — 80197 ASSAY OF TACROLIMUS: CPT | Performed by: INTERNAL MEDICINE

## 2021-03-31 PROCEDURE — 36415 COLL VENOUS BLD VENIPUNCTURE: CPT | Performed by: INTERNAL MEDICINE

## 2021-03-31 PROCEDURE — 80076 HEPATIC FUNCTION PANEL: CPT | Performed by: INTERNAL MEDICINE

## 2021-03-31 PROCEDURE — 80048 BASIC METABOLIC PNL TOTAL CA: CPT | Performed by: INTERNAL MEDICINE

## 2021-03-31 PROCEDURE — 83036 HEMOGLOBIN GLYCOSYLATED A1C: CPT | Performed by: INTERNAL MEDICINE

## 2021-03-31 PROCEDURE — 85027 COMPLETE CBC AUTOMATED: CPT | Performed by: INTERNAL MEDICINE

## 2021-04-01 ENCOUNTER — IMMUNIZATION (OUTPATIENT)
Dept: NURSING | Facility: CLINIC | Age: 56
End: 2021-04-01
Attending: INTERNAL MEDICINE
Payer: COMMERCIAL

## 2021-04-01 PROCEDURE — 91300 PR COVID VAC PFIZER DIL RECON 30 MCG/0.3 ML IM: CPT

## 2021-04-01 PROCEDURE — 0002A PR COVID VAC PFIZER DIL RECON 30 MCG/0.3 ML IM: CPT

## 2021-05-26 ENCOUNTER — TELEPHONE (OUTPATIENT)
Dept: NEPHROLOGY | Facility: CLINIC | Age: 56
End: 2021-05-26

## 2021-05-26 ENCOUNTER — TELEPHONE (OUTPATIENT)
Dept: TRANSPLANT | Facility: CLINIC | Age: 56
End: 2021-05-26

## 2021-05-26 DIAGNOSIS — N18.30 STAGE 3 CHRONIC KIDNEY DISEASE, UNSPECIFIED WHETHER STAGE 3A OR 3B CKD (H): ICD-10-CM

## 2021-05-26 DIAGNOSIS — R80.1 PERSISTENT PROTEINURIA: Primary | ICD-10-CM

## 2021-05-26 NOTE — TELEPHONE ENCOUNTER
Call to patient to remind him to get labs drawn prior to Tuesday visit with Jovanna. Also noticed that he prefers in person. Let VM. Currently have on hold June 11th at 830 if he wants in person. Provided number for call back.  Cele Mancilla LPN  Nephrology  [phone]

## 2021-05-26 NOTE — TELEPHONE ENCOUNTER
Camacho has a visit scheduled with Dr. Camejo and nephrology for 6/1 but would like to reschedule for 6/11 if possible. He is able to reschedule the visit with nephrology but would like to know if Dr. Camejo is available that day. Schedule looks overbooked already but will ask the schedulers to confirm. Camacho will call to reschedule if he doesn't hear back from me.

## 2021-05-27 ENCOUNTER — TELEPHONE (OUTPATIENT)
Dept: TRANSPLANT | Facility: CLINIC | Age: 56
End: 2021-05-27

## 2021-05-27 ENCOUNTER — MYC REFILL (OUTPATIENT)
Dept: INTERNAL MEDICINE | Facility: CLINIC | Age: 56
End: 2021-05-27

## 2021-05-27 ENCOUNTER — HOSPITAL ENCOUNTER (OUTPATIENT)
Dept: LAB | Facility: CLINIC | Age: 56
Discharge: HOME | End: 2021-05-27
Attending: INTERNAL MEDICINE
Payer: COMMERCIAL

## 2021-05-27 DIAGNOSIS — N18.30 STAGE 3 CHRONIC KIDNEY DISEASE, UNSPECIFIED WHETHER STAGE 3A OR 3B CKD (H): ICD-10-CM

## 2021-05-27 DIAGNOSIS — Z94.4 LIVER REPLACED BY TRANSPLANT (H): ICD-10-CM

## 2021-05-27 DIAGNOSIS — R80.1 PERSISTENT PROTEINURIA: ICD-10-CM

## 2021-05-27 DIAGNOSIS — Z13.220 LIPID SCREENING: ICD-10-CM

## 2021-05-27 DIAGNOSIS — M15.0 PRIMARY OSTEOARTHRITIS INVOLVING MULTIPLE JOINTS: ICD-10-CM

## 2021-05-27 LAB
ALBUMIN SERPL-MCNC: 4.1 G/DL (ref 3.4–5)
ALP SERPL-CCNC: 227 U/L (ref 40–150)
ALT SERPL W P-5'-P-CCNC: 32 U/L (ref 0–70)
ANION GAP SERPL CALCULATED.3IONS-SCNC: 6 MMOL/L (ref 3–14)
AST SERPL W P-5'-P-CCNC: 19 U/L (ref 0–45)
BILIRUB DIRECT SERPL-MCNC: 0.3 MG/DL (ref 0–0.2)
BILIRUB SERPL-MCNC: 0.9 MG/DL (ref 0.2–1.3)
BUN SERPL-MCNC: 50 MG/DL (ref 7–30)
CALCIUM SERPL-MCNC: 9.1 MG/DL (ref 8.5–10.1)
CHLORIDE SERPL-SCNC: 102 MMOL/L (ref 94–109)
CO2 SERPL-SCNC: 23 MMOL/L (ref 20–32)
CREAT SERPL-MCNC: 1.53 MG/DL (ref 0.66–1.25)
CREAT UR-MCNC: 60 MG/DL
ERYTHROCYTE [DISTWIDTH] IN BLOOD BY AUTOMATED COUNT: 13.2 % (ref 10–15)
FERRITIN SERPL-MCNC: 935 NG/ML (ref 26–388)
GFR SERPL CREATININE-BSD FRML MDRD: 50 ML/MIN/{1.73_M2}
GLUCOSE SERPL-MCNC: 335 MG/DL (ref 70–99)
HCT VFR BLD AUTO: 34.7 % (ref 40–53)
HGB BLD-MCNC: 11.9 G/DL (ref 13.3–17.7)
IRON SATN MFR SERPL: 26 % (ref 15–46)
IRON SERPL-MCNC: 69 UG/DL (ref 35–180)
MCH RBC QN AUTO: 31.1 PG (ref 26.5–33)
MCHC RBC AUTO-ENTMCNC: 34.3 G/DL (ref 31.5–36.5)
MCV RBC AUTO: 91 FL (ref 78–100)
PHOSPHATE SERPL-MCNC: 3.4 MG/DL (ref 2.5–4.5)
PLATELET # BLD AUTO: 106 10E9/L (ref 150–450)
POTASSIUM SERPL-SCNC: 5.1 MMOL/L (ref 3.4–5.3)
PROT SERPL-MCNC: 7.4 G/DL (ref 6.8–8.8)
PROT UR-MCNC: 0.34 G/L
PROT/CREAT 24H UR: 0.56 G/G CR (ref 0–0.2)
RBC # BLD AUTO: 3.83 10E12/L (ref 4.4–5.9)
SODIUM SERPL-SCNC: 131 MMOL/L (ref 133–144)
TIBC SERPL-MCNC: 267 UG/DL (ref 240–430)
WBC # BLD AUTO: 6.9 10E9/L (ref 4–11)

## 2021-05-27 PROCEDURE — 36415 COLL VENOUS BLD VENIPUNCTURE: CPT | Performed by: INTERNAL MEDICINE

## 2021-05-27 PROCEDURE — 84450 TRANSFERASE (AST) (SGOT): CPT

## 2021-05-27 PROCEDURE — 80069 RENAL FUNCTION PANEL: CPT

## 2021-05-27 PROCEDURE — 82248 BILIRUBIN DIRECT: CPT

## 2021-05-27 PROCEDURE — 84155 ASSAY OF PROTEIN SERUM: CPT

## 2021-05-27 PROCEDURE — 80197 ASSAY OF TACROLIMUS: CPT | Performed by: INTERNAL MEDICINE

## 2021-05-27 PROCEDURE — 85027 COMPLETE CBC AUTOMATED: CPT

## 2021-05-27 PROCEDURE — 83550 IRON BINDING TEST: CPT

## 2021-05-27 PROCEDURE — 83540 ASSAY OF IRON: CPT

## 2021-05-27 PROCEDURE — 84156 ASSAY OF PROTEIN URINE: CPT

## 2021-05-27 PROCEDURE — 84460 ALANINE AMINO (ALT) (SGPT): CPT

## 2021-05-27 PROCEDURE — 82247 BILIRUBIN TOTAL: CPT

## 2021-05-27 PROCEDURE — 84075 ASSAY ALKALINE PHOSPHATASE: CPT

## 2021-05-27 PROCEDURE — 82728 ASSAY OF FERRITIN: CPT

## 2021-05-27 NOTE — TELEPHONE ENCOUNTER
May 27, 2021 2:20 PM -  AIVERSE1: called pt to [facility] follow up [place] appt 6/11 per cord

## 2021-05-28 LAB
TACROLIMUS BLD-MCNC: 6.9 UG/L (ref 5–15)
TME LAST DOSE: NORMAL H

## 2021-05-28 RX ORDER — OXYCODONE HYDROCHLORIDE 5 MG/1
5 TABLET ORAL EVERY 4 HOURS PRN
Qty: 30 TABLET | Refills: 0 | Status: SHIPPED | OUTPATIENT
Start: 2021-05-28 | End: 2021-08-30

## 2021-05-28 NOTE — TELEPHONE ENCOUNTER
Reason For Call:        Chief Complaint   Patient presents with     Refill Request                 Medication Name, Dose and Monthly Quantity:   oxyCODONE (ROXICODONE) 5 MG tablet    Diagnosis requiring opiates:   Primary osteoarthritis involving multiple joints      Problem List Updated:   Yes     Opioid Agreement On File - [facility] PAIN CONTRACT ID# [id number]:       Last Urine Drug Screen (at least once every 12 months) Date:     Unexpected Results:        [place]  Data Reviewed (at least once every 3 months) Date:   06/03/19  Unexpected Results:         Last Fill Date:   02/25/2021  Next Fill Date:   5/28/2021  Last Visit with PCP:   12/07/2020    Future Visits with PCP:    Nothing scheduled  Processing:     RO BONNER CMA at 8:05 AM on 5/28/2021.

## 2021-06-01 ENCOUNTER — VIRTUAL VISIT (OUTPATIENT)
Dept: NEPHROLOGY | Facility: CLINIC | Age: 56
End: 2021-06-01
Payer: COMMERCIAL

## 2021-06-01 ENCOUNTER — OFFICE VISIT (OUTPATIENT)
Dept: GASTROENTEROLOGY | Facility: CLINIC | Age: 56
End: 2021-06-01
Attending: INTERNAL MEDICINE
Payer: COMMERCIAL

## 2021-06-01 VITALS
HEART RATE: 80 BPM | OXYGEN SATURATION: 98 % | SYSTOLIC BLOOD PRESSURE: 173 MMHG | BODY MASS INDEX: 34.16 KG/M2 | TEMPERATURE: 98.4 F | WEIGHT: 251.9 LBS | DIASTOLIC BLOOD PRESSURE: 96 MMHG

## 2021-06-01 VITALS — WEIGHT: 251 LBS | HEIGHT: 72 IN | BODY MASS INDEX: 34 KG/M2

## 2021-06-01 DIAGNOSIS — N18.31 STAGE 3A CHRONIC KIDNEY DISEASE (H): ICD-10-CM

## 2021-06-01 DIAGNOSIS — E55.9 VITAMIN D DEFICIENCY: ICD-10-CM

## 2021-06-01 DIAGNOSIS — N18.30 STAGE 3 CHRONIC KIDNEY DISEASE, UNSPECIFIED WHETHER STAGE 3A OR 3B CKD (H): Primary | ICD-10-CM

## 2021-06-01 DIAGNOSIS — Z94.4 LIVER REPLACED BY TRANSPLANT (H): Primary | ICD-10-CM

## 2021-06-01 PROCEDURE — 99214 OFFICE O/P EST MOD 30 MIN: CPT | Performed by: INTERNAL MEDICINE

## 2021-06-01 PROCEDURE — 99214 OFFICE O/P EST MOD 30 MIN: CPT | Mod: 95

## 2021-06-01 ASSESSMENT — PAIN SCALES - GENERAL: PAINLEVEL: SEVERE PAIN (7)

## 2021-06-01 ASSESSMENT — MIFFLIN-ST. JEOR: SCORE: 2006.53

## 2021-06-01 NOTE — NURSING NOTE
Chief Complaint   Patient presents with     RECHECK     Follow up [place]     Vital signs:  Temp: 98.4  F (36.9  C)   BP: (!) 173/96 Pulse: 80     SpO2: 98 %       Weight: 114.3 kg (251 lb 14.4 oz)  Estimated body mass index is 34.16 kg/m  as calculated from the following:    Height as of 12/14/20: 1.829 m (6').    Weight as of this encounter: 114.3 kg (251 lb 14.4 oz).      Marcella Rowe, CMA

## 2021-06-01 NOTE — PROGRESS NOTES
HISTORY OF PRESENT ILLNESS:  I had the pleasure of seeing Camacho Bhagat for followup in the Liver Transplant Clinic at the [facility] on 06/01/2021.  Mr. Bhagat returns for followup now 4 years status post liver transplantation.    He is doing well at this visit.  He denies any abdominal pain, itching or skin rash or fatigue.  He is now working full time in the [employer] system.  He denies any increased abdominal girth or lower extremity edema.    He denies any fevers or chills, cough, shortness of breath.  He is fully vaccinated against COVID-19.  He denies any nausea or vomiting.  He does have chronic diarrhea secondary to previous colectomy as a posttransplant complication.  His appetite has been good and his weight has otherwise been stable.    Current Outpatient Medications   Medication     alcohol swab prep pads     alpha-lipoic acid 600 MG capsule     amLODIPine (NORVASC) 10 MG tablet     blood glucose (NO BRAND SPECIFIED) test strip     blood glucose calibration (NO BRAND SPECIFIED) solution     blood glucose monitoring (NO BRAND SPECIFIED) meter device kit     cholecalciferol (VITAMIN D3) 1000 UNIT tablet     CIALIS 5 MG tablet     cyclobenzaprine (FLEXERIL) 10 MG tablet     fluticasone (FLONASE) 50 MCG/ACT nasal spray     furosemide (LASIX) 40 MG tablet     gabapentin (NEURONTIN) 300 MG capsule     Glucose Blood (BLOOD GLUCOSE TEST STRIPS) STRP     insulin glargine (LANTUS SOLOSTAR) 100 UNIT/ML pen     Insulin Lispro (HUMALOG KWIKPEN) 200 UNIT/ML soln     insulin pen needle (BD ENE U/F) 32G X 4 MM miscellaneous     metoprolol succinate ER (TOPROL-XL) 200 MG 24 hr tablet     multivitamin, therapeutic with minerals (MULTI-VITAMIN) TABS tablet     mycophenolic acid (GENERIC EQUIVALENT) 360 MG EC tablet     omeprazole (PRILOSEC) 20 MG DR capsule     oxyCODONE (ROXICODONE) 5 MG tablet     patiromer (VELTASSA) 8.4 g packet     predniSONE (DELTASONE) 2.5 MG tablet     tacrolimus (GENERIC  EQUIVALENT) 1 MG capsule     thin (NO BRAND SPECIFIED) lancets     ursodiol (ACTIGALL) 500 MG tablet     No current facility-administered medications for this visit.      BP (!) 173/96   Pulse 80   Temp 98.4  F (36.9  C)   Wt 114.3 kg (251 lb 14.4 oz)   SpO2 98%   BMI 34.16 kg/m      PHYSICAL EXAMINATION:  In general, he looks well.  HEENT exam shows no scleral icterus or temporal muscle wasting.  His chest is clear.  His abdominal exam shows no increase in girth.  No masses or tenderness to palpation are present.  His liver is 10 cm in span without left lobe enlargement.  No spleen tip is palpable.  His incision is well healed.  Extremity exam shows no edema.  Skin exam shows no suspicious lesions.  Neurologic exam is nonfocal.      Recent Results (from the past 168 hour(s))   Protein  random urine with Creat Ratio    Collection Time: 05/27/21  5:20 PM   Result Value Ref Range    Protein Random Urine 0.34 g/L    Protein Total Urine g/gr Creatinine 0.56 (H) 0 - 0.2 g/g Cr   Creatinine urine calculation only    Collection Time: 05/27/21  5:20 PM   Result Value Ref Range    Creatinine Urine 60 mg/dL   Tacrolimus level    Collection Time: 05/27/21  5:29 PM   Result Value Ref Range    Tacrolimus Last Dose 0500 05/27/2021     Tacrolimus Level 6.9 5.0 - 15.0 ug/L   CBC with platelets    Collection Time: 05/27/21  5:29 PM   Result Value Ref Range    WBC 6.9 4.0 - 11.0 10e9/L    RBC Count 3.83 (L) 4.4 - 5.9 10e12/L    Hemoglobin 11.9 (L) 13.3 - 17.7 g/dL    Hematocrit 34.7 (L) 40.0 - 53.0 %    MCV 91 78 - 100 fl    MCH 31.1 26.5 - 33.0 pg    MCHC 34.3 31.5 - 36.5 g/dL    RDW 13.2 10.0 - 15.0 %    Platelet Count 106 (L) 150 - 450 10e9/L   Renal panel    Collection Time: 05/27/21  5:29 PM   Result Value Ref Range    Sodium 131 (L) 133 - 144 mmol/L    Potassium 5.1 3.4 - 5.3 mmol/L    Chloride 102 94 - 109 mmol/L    Carbon Dioxide 23 20 - 32 mmol/L    Anion Gap 6 3 - 14 mmol/L    Glucose 335 (H) 70 - 99 mg/dL    Urea  Nitrogen 50 (H) 7 - 30 mg/dL    Creatinine 1.53 (H) 0.66 - 1.25 mg/dL    GFR Estimate 50 (L) >60 mL/min/[1.73_m2]    GFR Estimate If Black 58 (L) >60 mL/min/[1.73_m2]    Calcium 9.1 8.5 - 10.1 mg/dL    Phosphorus 3.4 2.5 - 4.5 mg/dL    Albumin 4.1 3.4 - 5.0 g/dL   Ferritin    Collection Time: 05/27/21  5:29 PM   Result Value Ref Range    Ferritin 935 (H) 26 - 388 ng/mL   Iron and iron binding capacity    Collection Time: 05/27/21  5:29 PM   Result Value Ref Range    Iron 69 35 - 180 ug/dL    Iron Binding Cap 267 240 - 430 ug/dL    Iron Saturation Index 26 15 - 46 %   Alkaline phosphatase    Collection Time: 05/27/21  5:29 PM   Result Value Ref Range    Alkaline Phosphatase 227 (H) 40 - 150 U/L   ALT    Collection Time: 05/27/21  5:29 PM   Result Value Ref Range    ALT 32 0 - 70 U/L   AST    Collection Time: 05/27/21  5:29 PM   Result Value Ref Range    AST 19 0 - 45 U/L   Bilirubin  total    Collection Time: 05/27/21  5:29 PM   Result Value Ref Range    Bilirubin Total 0.9 0.2 - 1.3 mg/dL   Bilirubin direct    Collection Time: 05/27/21  5:29 PM   Result Value Ref Range    Bilirubin Direct 0.3 (H) 0.0 - 0.2 mg/dL   Protein total    Collection Time: 05/27/21  5:29 PM   Result Value Ref Range    Protein Total 7.4 6.8 - 8.8 g/dL      IMPRESSION:  Mr. Bhagat is doing well now 4 years status post liver transplantation.  Because of his high BUN-to-creatinine ratio, I will decrease his tacrolimus dose to 4 mg twice a day.    We did discuss the fact that transplant patients do not respond as well as the general population to the COVID-19 vaccine, and he should continue to maintain precautions.    He is using Imodium for his post-colectomy diarrhea, which sounds as though most of the time it works fairly well.  I otherwise will not be making any other change to his medical regimen.  I will see him back in the clinic in 6 months.      Thank you very much for allowing me to participate in the care of this patient.  If you have  any questions regarding my recommendations, please do not hesitate to contact me.         Toñito Camejo MD      Professor of Medicine  [employer]      Executive Medical Director, Solid Organ Transplant Program  [facility]

## 2021-06-01 NOTE — PROGRESS NOTES
Chief Complaint   Patient presents with     RECHECK     6 month     Height 1.829 m (6'), weight 113.9 kg (251 lb).    Camacho is a 55 year old who is being evaluated via a billable telephone visit.      What phone number would you like to be contacted at? [phone]  How would you like to obtain your AVS? [employer]  Phone call duration: 20 Minutes. Annelise De Leon CMA

## 2021-06-01 NOTE — PROGRESS NOTES
Nephrology Telephone Visit 6/1/21    Assessment and Plan:       1. CKD3a w/mild proteinuria - Relatively stable. Creat 1.5, eGFR 50 ml/mn. UPCR 0.5 g/gCr.    Baseline remains in the low to mid 1 range since 2019.    Etiology of CKD likely multifactorial; DM, recurrent EJ, CNI.    - Had been intolerant of ACE/ARB previously given problems with hyperkalemia assoc with Tacrolimus, but retrial when TAC level was <0.3 did not result in Hyperkalemia. In fact Valtessa had been discontinued because he was becoming hypokalemic.    - Patient was restarted on ARB in 4/18 for unexplained nephrotic range proteinuria following ostomy takedown with improvement in UPCR, but then developed mild acute rejection and Tac dose was increased resulting in hyperkalemia. He was restarted on Valtassa with improvement in hyperkalemia and Losartan was discontinued again in 3/19   - Now TAC dose has been decreased so it would be ideal to consider retrial of ARB. D/W patient today. Will see how his b/p, glycemic control, weight loss go over the next several months   - Potassium has been controlled since 4/19 off Losartan. UPCR with only mild proteinuria but higher today in setting of rising BS.    - Blood pressure borderline control. Would like to see more in the 120-130/ range     - Does not use NSAIDs.    - A1c goal is 7%. HgbA1c 6.3% (3/21) but random BS 5/27 was 335!      2. HTN - Improved but still not optimal. Home blood pressures 130-140/. No edema/dyspnea.      Current antihypertensive regimen:      Lasix 40 mg bid     Amlodipine 10 mg qd      Toprol  mg every day         - Would like to retry Losartan given reduction in TAC dose, rising UPCR, borderline b/p control. Would increase Valtessa if necessary. Will see how he does over the next several months with weight loss   - Continue to monitor blood pressures       3. Hyperkalemia assoc with Tac/ARB - K 5.1. Has been acceptable since stopping Losartan in March 2019.    - Currently  on Valtassa 8.4 g/day. Will increase if needed. His TAC dose has been decreased as well.    - He tries to modify his diet w/respect to K foods      4. Volume status - Denies edema/dyspnea. Weight 250#, up from 242# in 12/20. His weight gain is not assoc with hypervolemia. Was 220# in 12/18. Blood pressures borderline   - Continue Lasix 40 mg bid.       5. Acid base - No acute concerns. Metabolic acidosis resolved following ileostomy takedown. Bicarb 23      6. BMD - Ca 9.1, Phos 3.4, albumin 4.1   - Vitamin D 40, PTH 29 (11/20)    - Continue Vit D 1000 U qd      7.Anemia -Hgb 11.9.    - Iron studies 5/21: Ferritin 935, Fe 69, IS 26   - Had colonoscopy 2017 (poor study), Ileoscopy 8/17 - nml   - Not on iron and has not needed DIPAK      8. Liver transplant IS: Tacrolimus, MMF, Pred. Tac level 6.9      9. Dispo- RCT 6 months for f/u. Asked that he schedule sooner if his blood pressures do not decline with weight loss so we can begin Losartan      Reason for Visit:  CKD3a/HTN follow up        HPI:  Mr Bhagat is a 54 yo male with CKD2, HTN, Chronic hyperkalemia, Liver transplant, present today for CKD/HTN followup. Last seen in clinic by me on 12/9/20.   Baseline creat low to mid 1's.       Interval Hx:     No hospital admissions.       ROS:  No acute concerns.   Working full time again now. Unfortunately he is gaining weight given that he eats a sub every day at work. Something he never did when he was not working.   Not much exercise but has had problems with hip OA pain in the past  BS have been elevated and B/Ps running in the 130-140/ range  Appetite and energy are good  No edema.   Denies dyspnea,  CP, abdominal pain. Voiding w/o difficulty.   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 Former smoker,   by profession. Exercising regularly.  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     Family Hx:   Family History   Problem Relation Age of Onset     Diabetes Father      Hypertension Father      Substance Abuse Father      Arthritis Mother      Thyroid Cancer Mother         Survivor!     Cervical Cancer Maternal Grandmother      Cerebrovascular Disease Maternal Grandfather      No Known Problems Paternal Grandmother      Prostate Cancer Paternal Grandfather      Colon Cancer No family hx of      Hyperlipidemia No family hx of      Coronary Artery Disease No family hx of      Breast Cancer No family hx of        Allergies:  Allergies   Allergen Reactions     Erythromycin GI Disturbance     Losartan Other (See Comments)     Consistently develops hyperkalemia     Vioxx      Nausea, vomiting       Medications:  Current Outpatient Medications   Medication Sig     alcohol swab prep pads Use to swab area of injection/francisca as directed.     alpha-lipoic acid 600 MG capsule Take 600 mg by mouth     amLODIPine (NORVASC) 10 MG tablet Take 1 tablet (10 mg) by mouth daily     blood glucose (NO BRAND SPECIFIED) test strip Use to test blood sugar 3 times daily or as directed. Any covered brand preferred by Pt that works with meter.     blood glucose calibration (NO BRAND SPECIFIED) solution Use to calibrate meter.     blood glucose monitoring (NO BRAND SPECIFIED) meter device kit Use to test blood sugar 3 times daily or as directed. Any covered brand preferred by Pt.     cholecalciferol (VITAMIN D3) 1000 UNIT tablet Take 1 tablet (1,000 Units) by mouth daily (Patient taking differently: Take 1,000 Units by mouth every morning )     CIALIS 5 MG tablet Take 5 mg by mouth as needed       cyclobenzaprine (FLEXERIL) 10 MG tablet Take 1 tablet (10 mg) by mouth 3 times daily as needed for muscle spasms     fluticasone (FLONASE) 50 MCG/ACT nasal spray Spray 1 spray into both nostrils daily     furosemide (LASIX) 40 MG tablet Take 1 tablet (40 mg) by mouth 2 times daily     gabapentin (NEURONTIN) 300 MG capsule Take 1 capsule (300 mg) by mouth 3 times daily     Glucose Blood (BLOOD GLUCOSE TEST STRIPS) STRP 1 strip by Lancet route 3 times daily     insulin glargine (LANTUS SOLOSTAR) 100 UNIT/ML pen Inject 55 Units Subcutaneous every morning     Insulin Lispro (HUMALOG KWIKPEN) 200 UNIT/ML soln Use one unit per 3 grams carbohydrates for all meals and snacks. Total daily dose up to 60 U.     insulin pen needle (BD ENE U/F) 32G X 4 MM miscellaneous Use 1 pen needle 4 times daily or as directed.     metoprolol succinate ER (TOPROL-XL) 200 MG 24 hr tablet Take 1 tablet (200 mg) by mouth daily     multivitamin, therapeutic with minerals (MULTI-VITAMIN) TABS tablet Take 1 tablet by mouth every morning     mycophenolic acid (GENERIC EQUIVALENT) 360 MG EC tablet Take 2 tablets (720 mg) by mouth every 12 hours     omeprazole (PRILOSEC) 20 MG DR capsule Take 1 capsule (20 mg) by mouth daily     oxyCODONE (ROXICODONE) 5 MG tablet Take 1 tablet (5 mg) by mouth every 4 hours as needed for pain maximum 6 tablet(s) per day     patiromer (VELTASSA) 8.4 g packet Take 8.4 g by mouth daily     predniSONE (DELTASONE) 2.5 MG tablet Take 1 tablet (2.5 mg) by mouth daily     tacrolimus (GENERIC EQUIVALENT) 1 MG capsule Take 5 capsules (5 mg) by mouth every 12 hours     thin (NO BRAND SPECIFIED) lancets Use with lanceting device. Any covered brand.     ursodiol (ACTIGALL) 500 MG tablet Take 1 tablet (500 mg) by mouth 2 times daily     No current facility-administered medications for this visit.       Vitals:  Wt 250#     Exam:    No exam performed    LABS:   CMP  Recent Labs   Lab Test 05/27/21  1729 03/31/21  1145  02/12/21  1331 11/24/20  1345   * 138 138 138   POTASSIUM 5.1 4.5 5.8* 4.7   CHLORIDE 102 109 111* 109   CO2 23 22 22 26   ANIONGAP 6 7 5 3   * 134* 251* 66*   BUN 50* 54* 44* 43*   CR 1.53* 1.56* 1.32* 1.42*   GFRESTIMATED 50* 49* 60* 55*   GFRESTBLACK 58* 57* 69 63   JACOB 9.1 8.9 8.7 8.8     Recent Labs   Lab Test 05/27/21  1729 03/31/21  1145 02/12/21  1331 11/24/20  1345   BILITOTAL 0.9 0.8 0.7 0.7   ALKPHOS 227* 220* 231* 238*   ALT 32 34 36 39   AST 19 20 20 23     CBC  Recent Labs   Lab Test 05/27/21 1729 03/31/21  1145 02/12/21  1331 11/24/20  1345   HGB 11.9* 12.2* 11.7* 12.1*   WBC 6.9 7.0 5.8 7.1   RBC 3.83* 3.93* 3.80* 3.85*   HCT 34.7* 36.1* 35.7* 36.4*   MCV 91 92 94 95   MCH 31.1 31.0 30.8 31.4   MCHC 34.3 33.8 32.8 33.2   RDW 13.2 13.5 13.5 13.5   * 116* 96* 115*     URINE STUDIES  Recent Labs   Lab Test 11/24/20  1325 04/02/20  1230 03/27/19  1155 04/11/18  1446   COLOR Yellow Light Yellow Yellow Yellow   APPEARANCE Clear Clear Clear Slightly Cloudy   URINEGLC Negative Negative 70* >499*   URINEBILI Negative Negative Negative Negative   URINEKETONE Negative Negative Negative Negative   SG 1.014 1.014 1.014 1.010   UBLD Negative Negative Negative Small*   URINEPH 5.0 5.0 5.0 5.0   PROTEIN 20* 10* 30* 100*   NITRITE Negative Negative Negative Negative   LEUKEST Negative Negative Negative Negative   RBCU 1 <1 1 3*   WBCU <1 <1 0 <1     Recent Labs   Lab Test 05/27/21  1720 11/24/20  1325 05/08/20  1225 04/02/20  1230   UTPG 0.56* 0.28* 0.41* 0.34*     PTH  Recent Labs   Lab Test 11/24/20  1345 08/06/19  1234 10/10/18  1059   PTHI 29 22 22     IRON STUDIES  Recent Labs   Lab Test 05/27/21  1729 11/24/20  1345 08/21/20  1510   IRON 69 105 123    249 235*   IRONSAT 26 42 52*   NELY 935* 1,104* 1,098*       Cinda Cazares, NP

## 2021-06-01 NOTE — LETTER
6/1/2021         RE: Camacho Bhagat  [address] [facility] [address]        Dear Colleague,    Thank you for referring your patient, Camacho Bhagat, to the [facility] [place]. Please see a copy of my visit note below.    HISTORY OF PRESENT ILLNESS:  I had the pleasure of seeing Camacho Bhagat for followup in the Liver Transplant Clinic at the [facility] on 06/01/2021.  Mr. Bhagat returns for followup now 4 years status post liver transplantation.    He is doing well at this visit.  He denies any abdominal pain, itching or skin rash or fatigue.  He is now working full time in the [employer] system.  He denies any increased abdominal girth or lower extremity edema.    He denies any fevers or chills, cough, shortness of breath.  He is fully vaccinated against COVID-19.  He denies any nausea or vomiting.  He does have chronic diarrhea secondary to previous colectomy as a posttransplant complication.  His appetite has been good and his weight has otherwise been stable.    Current Outpatient Medications   Medication     alcohol swab prep pads     alpha-lipoic acid 600 MG capsule     amLODIPine (NORVASC) 10 MG tablet     blood glucose (NO BRAND SPECIFIED) test strip     blood glucose calibration (NO BRAND SPECIFIED) solution     blood glucose monitoring (NO BRAND SPECIFIED) meter device kit     cholecalciferol (VITAMIN D3) 1000 UNIT tablet     CIALIS 5 MG tablet     cyclobenzaprine (FLEXERIL) 10 MG tablet     fluticasone (FLONASE) 50 MCG/ACT nasal spray     furosemide (LASIX) 40 MG tablet     gabapentin (NEURONTIN) 300 MG capsule     Glucose Blood (BLOOD GLUCOSE TEST STRIPS) STRP     insulin glargine (LANTUS SOLOSTAR) 100 UNIT/ML pen     Insulin Lispro (HUMALOG KWIKPEN) 200 UNIT/ML soln     insulin pen needle (BD ENE U/F) 32G X 4 MM miscellaneous     metoprolol succinate ER (TOPROL-XL) 200 MG 24 hr tablet     multivitamin, therapeutic with minerals  (MULTI-VITAMIN) TABS tablet     mycophenolic acid (GENERIC EQUIVALENT) 360 MG EC tablet     omeprazole (PRILOSEC) 20 MG DR capsule     oxyCODONE (ROXICODONE) 5 MG tablet     patiromer (VELTASSA) 8.4 g packet     predniSONE (DELTASONE) 2.5 MG tablet     tacrolimus (GENERIC EQUIVALENT) 1 MG capsule     thin (NO BRAND SPECIFIED) lancets     ursodiol (ACTIGALL) 500 MG tablet     No current facility-administered medications for this visit.      BP (!) 173/96   Pulse 80   Temp 98.4  F (36.9  C)   Wt 114.3 kg (251 lb 14.4 oz)   SpO2 98%   BMI 34.16 kg/m      PHYSICAL EXAMINATION:  In general, he looks well.  HEENT exam shows no scleral icterus or temporal muscle wasting.  His chest is clear.  His abdominal exam shows no increase in girth.  No masses or tenderness to palpation are present.  His liver is 10 cm in span without left lobe enlargement.  No spleen tip is palpable.  His incision is well healed.  Extremity exam shows no edema.  Skin exam shows no suspicious lesions.  Neurologic exam is nonfocal.      Recent Results (from the past 168 hour(s))   Protein  random urine with Creat Ratio    Collection Time: 05/27/21  5:20 PM   Result Value Ref Range    Protein Random Urine 0.34 g/L    Protein Total Urine g/gr Creatinine 0.56 (H) 0 - 0.2 g/g Cr   Creatinine urine calculation only    Collection Time: 05/27/21  5:20 PM   Result Value Ref Range    Creatinine Urine 60 mg/dL   Tacrolimus level    Collection Time: 05/27/21  5:29 PM   Result Value Ref Range    Tacrolimus Last Dose 0500 05/27/2021     Tacrolimus Level 6.9 5.0 - 15.0 ug/L   CBC with platelets    Collection Time: 05/27/21  5:29 PM   Result Value Ref Range    WBC 6.9 4.0 - 11.0 10e9/L    RBC Count 3.83 (L) 4.4 - 5.9 10e12/L    Hemoglobin 11.9 (L) 13.3 - 17.7 g/dL    Hematocrit 34.7 (L) 40.0 - 53.0 %    MCV 91 78 - 100 fl    MCH 31.1 26.5 - 33.0 pg    MCHC 34.3 31.5 - 36.5 g/dL    RDW 13.2 10.0 - 15.0 %    Platelet Count 106 (L) 150 - 450 10e9/L   Renal panel     Collection Time: 05/27/21  5:29 PM   Result Value Ref Range    Sodium 131 (L) 133 - 144 mmol/L    Potassium 5.1 3.4 - 5.3 mmol/L    Chloride 102 94 - 109 mmol/L    Carbon Dioxide 23 20 - 32 mmol/L    Anion Gap 6 3 - 14 mmol/L    Glucose 335 (H) 70 - 99 mg/dL    Urea Nitrogen 50 (H) 7 - 30 mg/dL    Creatinine 1.53 (H) 0.66 - 1.25 mg/dL    GFR Estimate 50 (L) >60 mL/min/[1.73_m2]    GFR Estimate If Black 58 (L) >60 mL/min/[1.73_m2]    Calcium 9.1 8.5 - 10.1 mg/dL    Phosphorus 3.4 2.5 - 4.5 mg/dL    Albumin 4.1 3.4 - 5.0 g/dL   Ferritin    Collection Time: 05/27/21  5:29 PM   Result Value Ref Range    Ferritin 935 (H) 26 - 388 ng/mL   Iron and iron binding capacity    Collection Time: 05/27/21  5:29 PM   Result Value Ref Range    Iron 69 35 - 180 ug/dL    Iron Binding Cap 267 240 - 430 ug/dL    Iron Saturation Index 26 15 - 46 %   Alkaline phosphatase    Collection Time: 05/27/21  5:29 PM   Result Value Ref Range    Alkaline Phosphatase 227 (H) 40 - 150 U/L   ALT    Collection Time: 05/27/21  5:29 PM   Result Value Ref Range    ALT 32 0 - 70 U/L   AST    Collection Time: 05/27/21  5:29 PM   Result Value Ref Range    AST 19 0 - 45 U/L   Bilirubin  total    Collection Time: 05/27/21  5:29 PM   Result Value Ref Range    Bilirubin Total 0.9 0.2 - 1.3 mg/dL   Bilirubin direct    Collection Time: 05/27/21  5:29 PM   Result Value Ref Range    Bilirubin Direct 0.3 (H) 0.0 - 0.2 mg/dL   Protein total    Collection Time: 05/27/21  5:29 PM   Result Value Ref Range    Protein Total 7.4 6.8 - 8.8 g/dL      IMPRESSION:  Mr. Bhagat is doing well now 4 years status post liver transplantation.  Because of his high BUN-to-creatinine ratio, I will decrease his tacrolimus dose to 4 mg twice a day.    We did discuss the fact that transplant patients do not respond as well as the general population to the COVID-19 vaccine, and he should continue to maintain precautions.    He is using Imodium for his post-colectomy diarrhea, which sounds  as though most of the time it works fairly well.  I otherwise will not be making any other change to his medical regimen.  I will see him back in the clinic in 6 months.      Thank you very much for allowing me to participate in the care of this patient.  If you have any questions regarding my recommendations, please do not hesitate to contact me.         Toñito Camejo MD      Professor of Medicine  [employer]      Executive Medical Director, Solid Organ Transplant Program  [facility]

## 2021-06-01 NOTE — LETTER
6/1/2021       RE: Camacho Bhagat  [address]  [facility] [address]     Dear Colleague,    Thank you for referring your patient, Camacho Bhagat, to the [facility] [place] at [facility]. Please see a copy of my visit note below.    Chief Complaint   Patient presents with     RECHECK     6 month     Height 1.829 m (6'), weight 113.9 kg (251 lb).    Camacho is a 55 year old who is being evaluated via a billable telephone visit.      What phone number would you like to be contacted at? [phone]  How would you like to obtain your AVS? [employer]  Phone call duration: 20 Minutes. Annelise De Leon CMA         Nephrology Telephone Visit 6/1/21    Assessment and Plan:       1. CKD3a w/mild proteinuria - Relatively stable. Creat 1.5, eGFR 50 ml/mn. UPCR 0.5 g/gCr.    Baseline remains in the low to mid 1 range since 2019.    Etiology of CKD likely multifactorial; DM, recurrent EJ, CNI.    - Had been intolerant of ACE/ARB previously given problems with hyperkalemia assoc with Tacrolimus, but retrial when TAC level was <0.3 did not result in Hyperkalemia. In fact Valtessa had been discontinued because he was becoming hypokalemic.    - Patient was restarted on ARB in 4/18 for unexplained nephrotic range proteinuria following ostomy takedown with improvement in UPCR, but then developed mild acute rejection and Tac dose was increased resulting in hyperkalemia. He was restarted on Valtassa with improvement in hyperkalemia and Losartan was discontinued again in 3/19   - Now TAC dose has been decreased so it would be ideal to consider retrial of ARB. D/W patient today. Will see how his b/p, glycemic control, weight loss go over the next several months   - Potassium has been controlled since 4/19 off Losartan. UPCR with only mild proteinuria but higher today in setting of rising BS.    - Blood pressure borderline control. Would like to see more in the  120-130/ range     - Does not use NSAIDs.    - A1c goal is 7%. HgbA1c 6.3% (3/21) but random BS 5/27 was 335!      2. HTN - Improved but still not optimal. Home blood pressures 130-140/. No edema/dyspnea.      Current antihypertensive regimen:      Lasix 40 mg bid     Amlodipine 10 mg qd      Toprol  mg every day         - Would like to retry Losartan given reduction in TAC dose, rising UPCR, borderline b/p control. Would increase Valtessa if necessary. Will see how he does over the next several months with weight loss   - Continue to monitor blood pressures       3. Hyperkalemia assoc with Tac/ARB - K 5.1. Has been acceptable since stopping Losartan in March 2019.    - Currently on Valtassa 8.4 g/day. Will increase if needed. His TAC dose has been decreased as well.    - He tries to modify his diet w/respect to K foods      4. Volume status - Denies edema/dyspnea. Weight 250#, up from 242# in 12/20. His weight gain is not assoc with hypervolemia. Was 220# in 12/18. Blood pressures borderline   - Continue Lasix 40 mg bid.       5. Acid base - No acute concerns. Metabolic acidosis resolved following ileostomy takedown. Bicarb 23      6. BMD - Ca 9.1, Phos 3.4, albumin 4.1   - Vitamin D 40, PTH 29 (11/20)    - Continue Vit D 1000 U qd      7.Anemia -Hgb 11.9.    - Iron studies 5/21: Ferritin 935, Fe 69, IS 26   - Had colonoscopy 2017 (poor study), Ileoscopy 8/17 - nml   - Not on iron and has not needed DIPAK      8. Liver transplant IS: Tacrolimus, MMF, Pred. Tac level 6.9      9. Dispo- RCT 6 months for f/u. Asked that he schedule sooner if his blood pressures do not decline with weight loss so we can begin Losartan      Reason for Visit:  CKD3a/HTN follow up        HPI:  Mr Bhagat is a 54 yo male with CKD2, HTN, Chronic hyperkalemia, Liver transplant, present today for CKD/HTN followup. Last seen in clinic by me on 12/9/20.   Baseline creat low to mid 1's.       Interval Hx:     No hospital admissions.        ROS:  No acute concerns.   Working full time again now. Unfortunately he is gaining weight given that he eats a sub every day at work. Something he never did when he was not working.   Not much exercise but has had problems with hip OA pain in the past  BS have been elevated and B/Ps running in the 130-140/ range  Appetite and energy are good  No edema.   Denies dyspnea,  CP, abdominal pain. Voiding w/o difficulty.   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 Former smoker,   by profession. Exercising regularly.  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     Family Hx:   Family History   Problem Relation Age of Onset     Diabetes Father      Hypertension Father      Substance Abuse Father      Arthritis Mother      Thyroid Cancer Mother         Survivor!     Cervical Cancer Maternal Grandmother      Cerebrovascular Disease Maternal Grandfather      No Known Problems Paternal Grandmother      Prostate Cancer Paternal Grandfather      Colon Cancer No family hx of      Hyperlipidemia No family hx of      Coronary Artery Disease No family hx of      Breast Cancer No family hx of        Allergies:  Allergies   Allergen Reactions     Erythromycin GI Disturbance     Losartan Other (See Comments)     Consistently develops hyperkalemia     Vioxx      Nausea, vomiting       Medications:  Current Outpatient Medications   Medication Sig     alcohol swab prep pads Use to swab area of injection/francisca as directed.     alpha-lipoic acid 600 MG capsule Take 600 mg by mouth     amLODIPine (NORVASC) 10 MG tablet  Take 1 tablet (10 mg) by mouth daily     blood glucose (NO BRAND SPECIFIED) test strip Use to test blood sugar 3 times daily or as directed. Any covered brand preferred by Pt that works with meter.     blood glucose calibration (NO BRAND SPECIFIED) solution Use to calibrate meter.     blood glucose monitoring (NO BRAND SPECIFIED) meter device kit Use to test blood sugar 3 times daily or as directed. Any covered brand preferred by Pt.     cholecalciferol (VITAMIN D3) 1000 UNIT tablet Take 1 tablet (1,000 Units) by mouth daily (Patient taking differently: Take 1,000 Units by mouth every morning )     CIALIS 5 MG tablet Take 5 mg by mouth as needed      cyclobenzaprine (FLEXERIL) 10 MG tablet Take 1 tablet (10 mg) by mouth 3 times daily as needed for muscle spasms     fluticasone (FLONASE) 50 MCG/ACT nasal spray Spray 1 spray into both nostrils daily     furosemide (LASIX) 40 MG tablet Take 1 tablet (40 mg) by mouth 2 times daily     gabapentin (NEURONTIN) 300 MG capsule Take 1 capsule (300 mg) by mouth 3 times daily     Glucose Blood (BLOOD GLUCOSE TEST STRIPS) STRP 1 strip by Lancet route 3 times daily     insulin glargine (LANTUS SOLOSTAR) 100 UNIT/ML pen Inject 55 Units Subcutaneous every morning     Insulin Lispro (HUMALOG KWIKPEN) 200 UNIT/ML soln Use one unit per 3 grams carbohydrates for all meals and snacks. Total daily dose up to 60 U.     insulin pen needle (BD ENE U/F) 32G X 4 MM miscellaneous Use 1 pen needle 4 times daily or as directed.     metoprolol succinate ER (TOPROL-XL) 200 MG 24 hr tablet Take 1 tablet (200 mg) by mouth daily     multivitamin, therapeutic with minerals (MULTI-VITAMIN) TABS tablet Take 1 tablet by mouth every morning     mycophenolic acid (GENERIC EQUIVALENT) 360 MG EC tablet Take 2 tablets (720 mg) by mouth every 12 hours     omeprazole (PRILOSEC) 20 MG DR capsule Take 1 capsule (20 mg) by mouth daily     oxyCODONE (ROXICODONE) 5 MG tablet Take 1 tablet (5 mg) by mouth every 4  hours as needed for pain maximum 6 tablet(s) per day     patiromer (VELTASSA) 8.4 g packet Take 8.4 g by mouth daily     predniSONE (DELTASONE) 2.5 MG tablet Take 1 tablet (2.5 mg) by mouth daily     tacrolimus (GENERIC EQUIVALENT) 1 MG capsule Take 5 capsules (5 mg) by mouth every 12 hours     thin (NO BRAND SPECIFIED) lancets Use with lanceting device. Any covered brand.     ursodiol (ACTIGALL) 500 MG tablet Take 1 tablet (500 mg) by mouth 2 times daily     No current facility-administered medications for this visit.       Vitals:  Wt 250#     Exam:    No exam performed    LABS:   CMP  Recent Labs   Lab Test 05/27/21 1729 03/31/21  1145 02/12/21  1331 11/24/20  1345   * 138 138 138   POTASSIUM 5.1 4.5 5.8* 4.7   CHLORIDE 102 109 111* 109   CO2 23 22 22 26   ANIONGAP 6 7 5 3   * 134* 251* 66*   BUN 50* 54* 44* 43*   CR 1.53* 1.56* 1.32* 1.42*   GFRESTIMATED 50* 49* 60* 55*   GFRESTBLACK 58* 57* 69 63   JACOB 9.1 8.9 8.7 8.8     Recent Labs   Lab Test 05/27/21 1729 03/31/21  1145 02/12/21  1331 11/24/20  1345   BILITOTAL 0.9 0.8 0.7 0.7   ALKPHOS 227* 220* 231* 238*   ALT 32 34 36 39   AST 19 20 20 23     CBC  Recent Labs   Lab Test 05/27/21 1729 03/31/21  1145 02/12/21  1331 11/24/20  1345   HGB 11.9* 12.2* 11.7* 12.1*   WBC 6.9 7.0 5.8 7.1   RBC 3.83* 3.93* 3.80* 3.85*   HCT 34.7* 36.1* 35.7* 36.4*   MCV 91 92 94 95   MCH 31.1 31.0 30.8 31.4   MCHC 34.3 33.8 32.8 33.2   RDW 13.2 13.5 13.5 13.5   * 116* 96* 115*     URINE STUDIES  Recent Labs   Lab Test 11/24/20  1325 04/02/20  1230 03/27/19  1155 04/11/18  1446   COLOR Yellow Light Yellow Yellow Yellow   APPEARANCE Clear Clear Clear Slightly Cloudy   URINEGLC Negative Negative 70* >499*   URINEBILI Negative Negative Negative Negative   URINEKETONE Negative Negative Negative Negative   SG 1.014 1.014 1.014 1.010   UBLD Negative Negative Negative Small*   URINEPH 5.0 5.0 5.0 5.0   PROTEIN 20* 10* 30* 100*   NITRITE Negative Negative Negative  Negative   LEUKEST Negative Negative Negative Negative   RBCU 1 <1 1 3*   WBCU <1 <1 0 <1     Recent Labs   Lab Test 05/27/21  1720 11/24/20  1325 05/08/20  1225 04/02/20  1230   UTPG 0.56* 0.28* 0.41* 0.34*     PTH  Recent Labs   Lab Test 11/24/20  1345 08/06/19  1234 10/10/18  1059   PTHI 29 22 22     IRON STUDIES  Recent Labs   Lab Test 05/27/21  1729 11/24/20  1345 08/21/20  1510   IRON 69 105 123    249 235*   IRONSAT 26 42 52*   NELY 935* 1,104* 1,098*       Cinda Cazares, NP

## 2021-06-03 NOTE — PROGRESS NOTES
Nutrition Progress Note - Re-consulted for TF (assess/order); Discussion of POC on SICU rounds; f/u for progress towards previous nutrition POC (see previous 7/6 assessment for details)    -Diet: remains intubated/NPO  -Enteral access: placed Cortrak FT today and awaiting AXR confirmation of tip location but suspect successful small bowel intubation  -CPN: @ 65 ml/hr (note advanced to previously rec initial goal dextrose provision of 235 g as of 7/9/17) + 250 ml 20% IV lipids daily (started 7/6 after Propofol gtt discontinued).  -Renal: K+ 3.3, Phos 3.5, , Cr 2.6 mg/dL (today) - noted UOPs (3745 ml yesterday, 3240 on 7/8) and Nephrology suspects EJ (on CKD) slowly improving and holding off on dialysis.  Would avoid PRO restriction at this time given critical illness  -GI: noted CT findings of colonic inflammation and would opt to fiber-free TF initially; noted/reviewed LFTs (all ~WNL);  (today, 7/10/2017), 174 (7/5/17) - note these both are likely elevated with IV lipids infusing (Propofol still infusing on 7/5; IV lipids infusing this AM)    Interventions:  Collaboration and Referral of Nutrition care - Discussed plan for FEN/GI/Renal/Resp on rounds with Providers who requested order TF @ 10 ml/hr (no advancement initially) and approved RD to order the following:  --Continue with same/current CPN/lipids for now.  --Ordered Peptamen 1.5 (fiber-free) @ 10 ml/hr (240 ml/day) = 360 kcals, 16 g PRO  --Ordered Metabolic Cart study today or tomorrow to help need to adjust CPN/evaluate most approp TF goal rate.  Will follow for results to make approp changes.    Janice Christianson ,RD, LD, CNSC (pgr [phone])     No

## 2021-06-06 DIAGNOSIS — Z94.4 LIVER TRANSPLANT RECIPIENT (H): ICD-10-CM

## 2021-06-07 ENCOUNTER — OFFICE VISIT (OUTPATIENT)
Dept: INTERNAL MEDICINE | Facility: CLINIC | Age: 56
End: 2021-06-07
Payer: COMMERCIAL

## 2021-06-07 VITALS
BODY MASS INDEX: 34.18 KG/M2 | SYSTOLIC BLOOD PRESSURE: 180 MMHG | WEIGHT: 252 LBS | HEART RATE: 71 BPM | OXYGEN SATURATION: 98 % | DIASTOLIC BLOOD PRESSURE: 86 MMHG

## 2021-06-07 DIAGNOSIS — M25.552 BILATERAL HIP PAIN: ICD-10-CM

## 2021-06-07 DIAGNOSIS — Z94.4 LIVER TRANSPLANT RECIPIENT (H): ICD-10-CM

## 2021-06-07 DIAGNOSIS — F17.220 CHEWING TOBACCO NICOTINE DEPENDENCE WITHOUT COMPLICATION: ICD-10-CM

## 2021-06-07 DIAGNOSIS — M79.671 BILATERAL FOOT PAIN: ICD-10-CM

## 2021-06-07 DIAGNOSIS — M15.0 PRIMARY OSTEOARTHRITIS INVOLVING MULTIPLE JOINTS: ICD-10-CM

## 2021-06-07 DIAGNOSIS — E11.42 TYPE 2 DIABETES MELLITUS WITH DIABETIC POLYNEUROPATHY, WITH LONG-TERM CURRENT USE OF INSULIN (H): Primary | ICD-10-CM

## 2021-06-07 DIAGNOSIS — N52.9 VASCULOGENIC ERECTILE DYSFUNCTION, UNSPECIFIED VASCULOGENIC ERECTILE DYSFUNCTION TYPE: ICD-10-CM

## 2021-06-07 DIAGNOSIS — M25.551 BILATERAL HIP PAIN: ICD-10-CM

## 2021-06-07 DIAGNOSIS — E11.21 TYPE 2 DIABETES MELLITUS WITH DIABETIC NEPHROPATHY, WITH LONG-TERM CURRENT USE OF INSULIN (H): ICD-10-CM

## 2021-06-07 DIAGNOSIS — I15.1 HYPERTENSION SECONDARY TO OTHER RENAL DISORDERS: ICD-10-CM

## 2021-06-07 DIAGNOSIS — Z79.4 TYPE 2 DIABETES MELLITUS WITH DIABETIC NEPHROPATHY, WITH LONG-TERM CURRENT USE OF INSULIN (H): ICD-10-CM

## 2021-06-07 DIAGNOSIS — M79.672 BILATERAL FOOT PAIN: ICD-10-CM

## 2021-06-07 DIAGNOSIS — Z79.4 TYPE 2 DIABETES MELLITUS WITH DIABETIC POLYNEUROPATHY, WITH LONG-TERM CURRENT USE OF INSULIN (H): Primary | ICD-10-CM

## 2021-06-07 DIAGNOSIS — N18.31 STAGE 3A CHRONIC KIDNEY DISEASE (H): ICD-10-CM

## 2021-06-07 DIAGNOSIS — R25.2 MUSCLE CRAMP, NOCTURNAL: ICD-10-CM

## 2021-06-07 PROCEDURE — 99214 OFFICE O/P EST MOD 30 MIN: CPT | Performed by: INTERNAL MEDICINE

## 2021-06-07 RX ORDER — INSULIN LISPRO 200 [IU]/ML
INJECTION, SOLUTION SUBCUTANEOUS
Qty: 60 ML | Refills: 3 | Status: SHIPPED | OUTPATIENT
Start: 2021-06-07 | End: 2021-07-16

## 2021-06-07 RX ORDER — SILDENAFIL CITRATE 20 MG/1
40 TABLET ORAL DAILY PRN
Qty: 10 TABLET | Refills: 11 | Status: SHIPPED | OUTPATIENT
Start: 2021-06-07 | End: 2022-12-05

## 2021-06-07 RX ORDER — TACROLIMUS 1 MG/1
CAPSULE ORAL
Qty: 900 CAPSULE | Refills: 3 | Status: SHIPPED | OUTPATIENT
Start: 2021-06-07 | End: 2021-06-08

## 2021-06-07 RX ORDER — NICOTINE 21 MG/24HR
1 PATCH, TRANSDERMAL 24 HOURS TRANSDERMAL EVERY 24 HOURS
Qty: 28 PATCH | Refills: 11 | Status: SHIPPED | OUTPATIENT
Start: 2021-06-07 | End: 2022-06-06

## 2021-06-07 NOTE — NURSING NOTE
Chief Complaint   Patient presents with     RECHECK     6 month follow up.      Kelly Suarez LPN at 10:01 AM on 6/7/2021.

## 2021-06-07 NOTE — PROGRESS NOTES
ASSESSMENT/PLAN:    1.  Uncontrolled Type II Diabetes.  He mentions that his diabetes has been relatively poorly controlled recently.  Part of this is because he got a new job which has easy access to food that he should not and would not normally eat.  He has renewed interest in managing his diabetes and would like to see about getting a continuous glucose monitor and even an insulin pump.  I mentioned that the first step in pursuing these would be a meeting and evaluation with the diabetes educators.  This referral was made.    2 leg cramps.  He has many reasons for having leg cramps.  He has chronic difficulties with maintaining a normal potassium level with very high levels at baseline.  He also admits to drinking less fluid overall.  He does have low back pain however that is mostly on the right where is the symptoms of leg cramps are symmetrical in his legs.  His exam showed good peripheral pulses in his feet so lack of blood flow is less of a concern.  I have asked him to get a metabolic panel along with a magnesium check today.    3. Tobacco dependence.  He is to be started using chewing tobacco and would like to stop.  He was able to stop in the past in preparation for liver transplants.  We discussed a variety of options and settled on the combination of Chantix and nicotine patches.  These were both ordered for him.    4.  Erectile dysfunction -we talked over a number of different options for treatment.  I suggested generic sildenafil as it effective option.  He did ask about continuous use medications.  I discussed that in the absence of benign prostatic hypertrophy and urinary tract symptoms that continue his Cialis might not be as effective as episodic sildenafil would be.  Therefore we will try the sildenafil at this point.    5. low back pain -with the other issues that we needed to discuss we did not have much time to spend on this.  He did point to his low mid back is the source with radiation to  the right buttock.  He stated that the pain did not go down his legs.  He has rare oxycodone use for arthritis (unclear if inflammatory or osteoarthritis)    6. CKD3a and Hypertension - nephrology note from 6/1/21 reviewed.  considering restart of losartan however this is problematic because he has had elevated potassium.    7. Liver transplant - note for Dr. Camejo from 6/1/21 reviewed    Number and complexity of problems:  4 unstable chronic illness or with exacerbation (CKD, hypertension, diabetes, tobacco use)  1 stable chronic illness (liver transplant)  1 undiagnosed new problem (leg cramps)  1 acute uncomplicated illness (erectile dysfunction)    Amount and Complexity of Data Reviewed/Analyzed:  1 External notes reviewed  2 Unique test ordered    Risk of complication/morbidity of patient management:  Patient receiving prescription management    Shay Kirkpatrick MD, FACP      Chief complaint:  Camacho Bhagat is a 55 year old male presents for   Chief Complaint   Patient presents with     RECHECK     6 month follow up.         SUBJECTIVE:  Here for a follow up.    He wants me to do diabetes.  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  He wonders about Dexcom and pump.      His nephrology wants to see about starting losartan but has high potassium.    He will get intense whole leg cramping.  It will start with the foot and shoot up or start in the thigh and shoot down the leg.  It will start and in 30 sec it will be full blown.  It will be at 7:30-8p.  He is not drinking as much water during the day.  It is both legs.    He has started chewing tobacco.  He has done this from [age]yr but then broke it for the transplant but then restarted.      42 units per meal of Humalog    Medications and allergies were reviewed by me today.       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   Plan:  Increase tacrolimus dose, currently on low dose tacrolimus with level < 3.0.  Plan to increase for goal = 8.    Also on myfortic 720mg Q 12 hours.   Prednisone 2.5mg q day.           Abscess, intra-abdominal, postoperative 02/13/2018     Priority: Medium     Ileostomy in place (H) 01/05/2018     Priority: Medium     Peristomal skin complication 11/16/2017     Priority: Medium     Painful periwound skin 11/16/2017     Priority: Medium     Encounter for attention to ileostomy (H) 11/16/2017     Priority: Medium     History of creation of ostomy (H) 10/30/2017     Priority: Medium     Elevated serum creatinine 10/16/2017     Priority: Medium     Pancytopenia (H) 08/25/2017     Priority: Medium     Gangrene of finger (H) 08/25/2017     Priority: Medium     Ischemia of both lower extremities 08/25/2017     Priority: Medium     Distal ischemia due to shock/high pressor requirements       S/P liver transplant (H) 07/26/2017     Priority: Medium     Neutropenic colitis (H) 07/04/2017     Priority: Medium     Immunosuppressed status (H) 03/10/2017     Priority: Medium     Liver transplant recipient (H) 03/04/2017     Priority: Medium     Hyperkalemia 02/14/2017     Priority: Medium     Hepatocellular carcinoma (H) 01/27/2016     Priority: Medium     Osteoarthritis 01/18/2015     Priority: Medium     Rheumatoid arthritis (H) 01/18/2015     Priority: Medium     Medication refill- do not delete  11/13/2013     Priority: Medium      Fibromyalgia 08/15/2011     Priority: Medium     Sicca syndrome (H) 08/15/2011     Priority: Medium     Testicular hypofunction      Priority: Medium     Problem list name updated by automated process. Provider to review       Carpal tunnel syndrome 07/08/2002     Priority: Medium       PHYSICAL EXAM:  BP (!) 180/86   Pulse 71   Wt 114.3 kg (252 lb)   SpO2 98%   BMI 34.18 kg/m    Constitutional: no distress, comfortable, pleasant   Musculoskeletal: there is pain in his midback but not to palpation - the location is there and then traces to the right buttock

## 2021-06-07 NOTE — PATIENT INSTRUCTIONS
[facility] Medication Refill Request Information:  * Please contact your pharmacy regarding ANY request for medication refills.  ** [facility] Prescription Fax = [phone]  * Please allow 3 business days for routine medication refills.  * Please allow 5 business days for controlled substance medication refills.     [facility] Test Result notification information:  *You will be notified with in 7-10 days of your appointment day regarding the results of your test.  If you are on MyChart you will be notified as soon as the provider has reviewed the results and signed off on them.    [facility]: [phone]

## 2021-06-08 ENCOUNTER — HOSPITAL ENCOUNTER (OUTPATIENT)
Dept: LAB | Facility: CLINIC | Age: 56
Discharge: HOME | End: 2021-06-08
Attending: INTERNAL MEDICINE | Admitting: INTERNAL MEDICINE
Payer: COMMERCIAL

## 2021-06-08 DIAGNOSIS — Z94.4 LIVER TRANSPLANT RECIPIENT (H): ICD-10-CM

## 2021-06-08 DIAGNOSIS — R25.2 MUSCLE CRAMP, NOCTURNAL: ICD-10-CM

## 2021-06-08 LAB
ANION GAP SERPL CALCULATED.3IONS-SCNC: 6 MMOL/L (ref 3–14)
BUN SERPL-MCNC: 43 MG/DL (ref 7–30)
CALCIUM SERPL-MCNC: 8.9 MG/DL (ref 8.5–10.1)
CHLORIDE SERPL-SCNC: 105 MMOL/L (ref 94–109)
CO2 SERPL-SCNC: 23 MMOL/L (ref 20–32)
CREAT SERPL-MCNC: 1.51 MG/DL (ref 0.66–1.25)
GFR SERPL CREATININE-BSD FRML MDRD: 51 ML/MIN/{1.73_M2}
GLUCOSE SERPL-MCNC: 249 MG/DL (ref 70–99)
MAGNESIUM SERPL-MCNC: 2.1 MG/DL (ref 1.6–2.3)
POTASSIUM SERPL-SCNC: 4.9 MMOL/L (ref 3.4–5.3)
SODIUM SERPL-SCNC: 134 MMOL/L (ref 133–144)

## 2021-06-08 PROCEDURE — 83735 ASSAY OF MAGNESIUM: CPT | Performed by: INTERNAL MEDICINE

## 2021-06-08 PROCEDURE — 36415 COLL VENOUS BLD VENIPUNCTURE: CPT | Performed by: INTERNAL MEDICINE

## 2021-06-08 PROCEDURE — 80048 BASIC METABOLIC PNL TOTAL CA: CPT | Performed by: INTERNAL MEDICINE

## 2021-06-08 RX ORDER — TACROLIMUS 1 MG/1
5 CAPSULE ORAL EVERY 12 HOURS
Qty: 900 CAPSULE | Refills: 3 | Status: SHIPPED | OUTPATIENT
Start: 2021-06-08 | End: 2021-06-29

## 2021-06-16 ENCOUNTER — TELEPHONE (OUTPATIENT)
Dept: INTERNAL MEDICINE | Facility: CLINIC | Age: 56
End: 2021-06-16

## 2021-06-16 NOTE — TELEPHONE ENCOUNTER
Left voice message with [facility] number to call to schedule a telephone visit for 2-3 weeks, f/up diabetes with Dr Kirkpatrick per provider last office visit on 6/7/21.    Rosie Vasquez, Clinic Coordinator, June 16, 2021 at 3:13 PM

## 2021-06-17 NOTE — ANESTHESIA PROCEDURE NOTES
Central Line Procedure Note  Staff:     Anesthesiologist:  TASHI MIRANDA    Resident/CRNA:  GLYNN PARDO    Central line placed by Resident/CRNA in the presence of a teaching physician    Location: In OR after induction  Procedure Start/Stop Times:     patient identified, IV checked, site marked, risks and benefits discussed, informed consent, monitors and equipment checked, pre-op evaluation and at physician/surgeon's request      Correct Patient: Yes      Correct Position: Yes      Correct Site: Yes      Correct Procedure: Yes      Correct Laterality:  Yes    Site Marked:  Yes  Line Placement:     Procedure:  Central Line    Insertion laterality:  Right    Insertion site:  Internal Jugular    Position:  Trendelenburg      Maximal Sterile Barriers: All elements of maximal sterile barrier technique followed      (Maximal sterile barriers include:   Sterile gown, Sterile Gloves, Mask, Cap, Whole body draped, hand hygiene and acceptable skin prep).Skin Prep: Chloraprep         Injection Technique:  Ultrasound guided    Sterile Ultrasound Technique:  Sterile probe cover and Sterile gel    Vein evaluated via U/S for patency/adequacy of catheter insertion and is adequate.  Using realtime U/S imaging the vein was punctured, and needle was observed entering vein on U/S      Permanent Image entered into patient's record      Local skin infiltration:  None    Catheter size:  7 Fr, 3 lumen, 20 cm    Catheter length at skin (cm):  15    Cath secured with: suture      Dressing:  Tegaderm and Biopatch    Complications:  None obvious    Blood aspirated all lumens: Yes      All Lumens Flushed: Yes      Verification method:  Placement to be verified post-op         Patient Instructions by Hyun Arias MD at 11/15/2019 10:10 AM     Author: Hyun Arias MD Service: -- Author Type: Physician    Filed: 11/15/2019 11:46 AM Encounter Date: 11/15/2019 Status: Signed    : Hyun Arias MD (Physician)         Patient Education     Viral Pharyngitis (Sore Throat)    You or your child have pharyngitis (sore throat). This infection is caused by a virus. It can cause throat pain that is worse when swallowing, aching all over, headache, and fever. The infection may be spread by coughing, kissing, or touching others after touching your mouth or nose. Antibiotic medicines do not work against viruses. They are not used for treating this illness.  Home care    If symptoms are severe, you or your child should rest at home. Return to work or school when you or your child feel well enough.     You or your child should drink plenty of fluids to prevent dehydration.    Use throat lozenges or numbing throat sprays to help reduce pain. Gargling with warm salt water will also help reduce throat pain. Dissolve 1/2 teaspoon of salt in 1 glass of warm water. Children can sip on juice or a popsicle. Children 5 years and older can also suck on a lollipop or hard candy.    Dont eat salty or spicy foods or give them to your child. These can be irritating to the throat.  Medicines for a child: You can give your child acetaminophen for fever, fussiness, or discomfort. In babies over 6 months of age, you may use ibuprofen instead of acetaminophen. If your child has chronic liver or kidney disease or ever had a stomach ulcer or GI bleeding, talk with your young healthcare provider before giving these medicines. Aspirin should never be used by any child under 18 years of age who has a fever. It may cause severe liver damage.  Medicines for an adult: You may use acetaminophen or ibuprofen to control pain or fever, unless another medicine was prescribed for this. If you have chronic liver  or kidney disease or ever had a stomach ulcer or GI bleeding, talk with your healthcare provider before using these medicines.  Follow-up care  Follow up with a healthcare provider or our staff if you or your child are not getting better over the next week.  When to seek medical advice  Call your healthcare provider right away if any of these occur:    Fever as directed by your healthcare provider.  For children, seek care if:  ? Your child is of any age and has repeated fevers above 104 F (40 C).  ? Your child is younger than 2 years of age and has a fever of 100.4 F (38 C) for more than 1 day.  ? Your child is 2 years old or older and has a fever of 100.4 F (38 C) for more than 3 days.    New or worsening ear pain, sinus pain, or headache    Painful lumps in the back of neck    Stiff neck    Lymph nodes are getting larger    Cant swallow liquids, a lot of drooling, or cant open mouth wide due to throat pain    Signs of dehydration, such as very dark urine or no urine, sunken eyes, dizziness    Trouble breathing or noisy breathing    Muffled voice    New rash    Other symptoms are getting worse  Date Last Reviewed: 10/1/2017    8330-4640 [employer]. [address] [place] [address], [place] [address]. All rights reserved. This information is not intended as a substitute for professional medical care. Always follow your healthcare professional's instructions.

## 2021-06-29 DIAGNOSIS — Z94.4 LIVER TRANSPLANT RECIPIENT (H): ICD-10-CM

## 2021-06-29 RX ORDER — TACROLIMUS 1 MG/1
4 CAPSULE ORAL EVERY 12 HOURS
Qty: 730 CAPSULE | Refills: 3 | Status: SHIPPED | OUTPATIENT
Start: 2021-06-29 | End: 2021-12-07

## 2021-06-30 ENCOUNTER — ALLIED HEALTH/NURSE VISIT (OUTPATIENT)
Dept: EDUCATION SERVICES | Facility: CLINIC | Age: 56
End: 2021-06-30
Attending: INTERNAL MEDICINE
Payer: COMMERCIAL

## 2021-06-30 DIAGNOSIS — Z79.4 TYPE 2 DIABETES MELLITUS WITH DIABETIC POLYNEUROPATHY, WITH LONG-TERM CURRENT USE OF INSULIN (H): ICD-10-CM

## 2021-06-30 DIAGNOSIS — E11.42 TYPE 2 DIABETES MELLITUS WITH DIABETIC POLYNEUROPATHY, WITH LONG-TERM CURRENT USE OF INSULIN (H): ICD-10-CM

## 2021-06-30 PROCEDURE — 99207 PR NO CHARGE LOS: CPT | Performed by: REGISTERED NURSE

## 2021-06-30 NOTE — PATIENT INSTRUCTIONS
Increase your Lantus dose to 65 units now.     Monitor your glucose carefully for a week.  If your fasting glucose is still over 150, increase your dose again to 70 units.      Names of the Apps you need:   Dexcom G6   Dexcom Clarity.   Follow the directions for getting your sharing going with the clinic.     Appointment with Jenna De La Torre PA-C is July 27 at 4:30pm by phone.      SRINIVASA HernandezN, RN, [facility]  Certified Diabetes Care and   [facility] Endocrinology and Diabetes   [facility]  Clinic [id number]  Phone [phone]

## 2021-07-01 NOTE — PROGRESS NOTES
Diabetes Self-Management Education & Support    Camacho Bhagat presents today for education related to Type 2 diabetes    Patient is being treated with:  insulin  He is accompanied by self    Year of diagnosis: 2003  Referring provider:  Shay Kirkpatrick MD  Living Situation: Lives alone.  Has a pet Khmer Esteban.  Employment: [employer] at [employer].     PATIENT CONCERNS RELATED TO DIABETES SELF MANAGEMENT:     He has been taking insulin since about 3 years after diagnosis.  He was on U-500 for a while, he says.  When he was in liver failure, he was having a lot of hypoglycemia.  He was started on a basal-bolus regimen sometime between 2010 and 2018.  His liver transplant occurred in 2017.    In 2018 he transferred his diabetes care from [facility] to the [facility] and started seeing Dr. West.  He states that he's been on the same insulin regimen since his liver transplant and has always had very good control  (A1C 4 months ago was 6.4%).  He states that two months ago, his glucose became rather suddenly out of control). He correlates this to his second Covid 19 vaccine, given the beginning of April.        ASSESSMENT:    Taking Medication:     Current Diabetes Management per Patient:  Taking diabetes medications?   yes:     Diabetes Medication(s)     Insulin       insulin glargine (LANTUS SOLOSTAR) 100 UNIT/ML pen    Inject 55 Units Subcutaneous every morning     Insulin Lispro (HUMALOG KWIKPEN) 200 UNIT/ML soln    Use one unit per 3 grams carbohydrates for all meals and snacks. Total daily dose up to 60 U.          Monitoring    Patient glucose self monitoring as follows: four times daily  BG meter: One Touch Verio, which he forgot to bring today.   meter  BG results:  Reports that his fastings have ranged between 125 and 425, but states that most of the time, they are in the mid-200 range.   His pre-meal glucoses have been between 150 and 225.  Bedtimes have been as high as in the 400's.  He  denies missing any of his long acting insulin doses, however because of the nature of his job, he works pretty much straight through his lunch break, so he neither eats nor takes any rapid acting insulin.     Patient's most recent   Lab Results   Component Value Date    A1C 6.3 03/31/2021      Patient's A1C goal: <8.0    Activity: works as a [employer], which involves a fair amount of movement.  He also walks his dog several times a week.     Healthy Eating:    Breakfast:  Generally 2 egg whites and turkey sausage, english muffin and a glass of milk (about 45 grams).  For this he takes about 35 units of insulin (15 for the meal and 20 to correct).    Lunch:  He doesn't eat lunch on weekdays  Dinner: Lean meat, some type of starch, sweet potato or brown rice.  States that he eats a lot of vegetables.    He states he doesn't snack.  Does not drink any beverages that contain sugar.      Problem Solving:      Patient is at risk of hypoglycemia?: YES, Frequency is less than once a month and Usual treatment is juice or soda  Hospitalizations for hyper or hypoglycemia: No    EDUCATION and INSTRUCTION PROVIDED AT THIS VISIT:       Discussed the deterioration of his glucose control, which sounds fairly recent.  He is using the same insulin regimen for the past 4 years or so, with fairly good results, although his A1C is not reliable because of chronic anemia.  He thinks that his control got much worse after his second P    He is here because he feels that he could get better control by using a continuous glucose monitor, and I agree.  He is also interested in getting an Omnipod insulin pump.  He would like to do a 30 day trial.  Explained that application for the Dexcom will take some time, as it needs to be processed as a DME instead of prescription benefit because of his insurance.  In the time being, I gave him two sensors and a transmitter for the Dexcom CGM with instructions for how to set it up.  We also discussed  increasing his basal insulin dose.  He estimates that at this point he is taking about 150-160 units TTD, 55 units of long acting, which is about 30% of his total.  I suggested today that he increase his Lantus dose by 10 units now, and increase once a week by 5 units until his fasting glucose is below 150 mg/dL.  Explained that he may need to back off on his Humalog insulin.      He hasn't had an endocrinology visit since September 2020.  He has had some trouble finding a time with  as he can only do telephone or in-person visits.  He doesn't have video capability.  Explained that in order to get an insulin pump, he will need to have a visit with an endocrinologist.  He is willing but has limitations as to when he can schedule visits.  I checked with clinic staff and was able to get him scheduled with Jenna De La Torre PA-C in one month.  It will be important to have documentation that will support acquisition of a continuous glucose monitor.     Patient-stated goal written and given to Camacho Bhagat.  Verbalized and demonstrated understanding of instructions.     PLAN:  See patient instructions  AVS printed and given to patient    FOLLOW-UP:    Jenna De La Torre Phone appt 7/27 at 4:30pm.     Time spent with patient at today's visit was 60 minutes.      Any diabetes medication dose changes were made via the CDE Protocol and Collaborative Practice Agreement with Joann and  Sherita.  A copy of this encounter was provided to patient's referring provider.

## 2021-07-15 DIAGNOSIS — E11.21 TYPE 2 DIABETES MELLITUS WITH DIABETIC NEPHROPATHY, WITH LONG-TERM CURRENT USE OF INSULIN (H): ICD-10-CM

## 2021-07-15 DIAGNOSIS — Z79.4 TYPE 2 DIABETES MELLITUS WITH DIABETIC NEPHROPATHY, WITH LONG-TERM CURRENT USE OF INSULIN (H): ICD-10-CM

## 2021-07-16 ENCOUNTER — TELEPHONE (OUTPATIENT)
Dept: INTERNAL MEDICINE | Facility: CLINIC | Age: 56
End: 2021-07-16

## 2021-07-16 ENCOUNTER — VIRTUAL VISIT (OUTPATIENT)
Dept: INTERNAL MEDICINE | Facility: CLINIC | Age: 56
End: 2021-07-16
Payer: COMMERCIAL

## 2021-07-16 DIAGNOSIS — M54.50 LOW BACK PAIN AT MULTIPLE SITES: ICD-10-CM

## 2021-07-16 DIAGNOSIS — M54.16 LUMBAR RADICULOPATHY: ICD-10-CM

## 2021-07-16 DIAGNOSIS — E11.21 TYPE 2 DIABETES MELLITUS WITH DIABETIC NEPHROPATHY, WITH LONG-TERM CURRENT USE OF INSULIN (H): Primary | ICD-10-CM

## 2021-07-16 DIAGNOSIS — E11.42 DIABETIC POLYNEUROPATHY ASSOCIATED WITH TYPE 2 DIABETES MELLITUS (H): ICD-10-CM

## 2021-07-16 DIAGNOSIS — Z94.4 S/P LIVER TRANSPLANT (H): ICD-10-CM

## 2021-07-16 DIAGNOSIS — N18.31 STAGE 3A CHRONIC KIDNEY DISEASE (H): ICD-10-CM

## 2021-07-16 DIAGNOSIS — M06.9 RHEUMATOID ARTHRITIS INVOLVING MULTIPLE SITES, UNSPECIFIED WHETHER RHEUMATOID FACTOR PRESENT (H): ICD-10-CM

## 2021-07-16 DIAGNOSIS — Z79.4 TYPE 2 DIABETES MELLITUS WITH DIABETIC NEPHROPATHY, WITH LONG-TERM CURRENT USE OF INSULIN (H): Primary | ICD-10-CM

## 2021-07-16 DIAGNOSIS — D84.9 IMMUNOSUPPRESSION (H): ICD-10-CM

## 2021-07-16 PROCEDURE — 99214 OFFICE O/P EST MOD 30 MIN: CPT | Mod: 95 | Performed by: INTERNAL MEDICINE

## 2021-07-16 RX ORDER — INSULIN LISPRO 200 [IU]/ML
INJECTION, SOLUTION SUBCUTANEOUS
Qty: 15 ML | Refills: 11 | Status: SHIPPED | OUTPATIENT
Start: 2021-07-16 | End: 2021-07-26

## 2021-07-16 RX ORDER — GABAPENTIN 300 MG/1
600 CAPSULE ORAL 3 TIMES DAILY
Qty: 180 CAPSULE | Refills: 11 | Status: SHIPPED | OUTPATIENT
Start: 2021-07-16 | End: 2021-07-26

## 2021-07-16 NOTE — PATIENT INSTRUCTIONS
[facility] Medication Refill Request Information:  * Please contact your pharmacy regarding ANY request for medication refills.  ** [facility] Prescription Fax = [phone]  * Please allow 3 business days for routine medication refills.  * Please allow 5 business days for controlled substance medication refills.     [facility] Test Result notification information:  *You will be notified with in 7-10 days of your appointment day regarding the results of your test.  If you are on MyChart you will be notified as soon as the provider has reviewed the results and signed off on them.    [facility]: [phone]

## 2021-07-16 NOTE — PROGRESS NOTES
"SUBJECTIVE/OBJECTIVE:    Camacho Bhagat is a 55 year old male who presents to clinic today for the following health issues:    Diabetes - met with CDE who connected him with a Dexcom.  He thinks it is very useful - he can see when and how long he is in range.  He will have some very high glucoses and \"skyrocketing\" insulin - he was on Humalog 72 units/day and is not at 100-115 units/day and Lantus was 55 units/day and now on 65 units/day.  He is still getting out of range on his glucoses.  He will meet with endocrine to talk about an Omnipod insulin pump.  He has risks for pulling the tubes out - he has done this when unconscious in the hospital and plays aggressively with his dog.    He wants his CDE to respond to him.  He needs another humalog and lantus prescription.    He has more neuropathy in the right foot.  He says that this is correlated with the worsening of his diabetes control    Chewing tobacco - will start the Chantix and nicotine this weekend.  He is ready to quit.    Low back pain - the back is getting worse in the lower back.  He describes extreme pain.  When he stands up form a lying or sitting position he cannot get to fully straight position - he has to wait until the pain dissapates.  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  He has had pain going down the legs - both but L>R usually.  It will go down the leg into his calf muscle but not all the way to the toe.    The following have been reviewed:  Medication list  Past medical/surgical history    In reviewing other imaging studies I did find the following regarding his spine:  CT scan of chest 6/25/2019:  Bones and soft tissues: No suspicious bone findings. Mild degenerative  changes of the thoracic spine. Multilevel disc space narrowing with  intradiscal gas. No acute osseous abnormality.    CT scan abdomen 2/21/2018:  Bones and soft tissues: Sclerotic focus in the right ilium is likely a  bone island. " No aggressive appearing osseous lesions. Multilevel  degenerative changes.    ASSESSMENT/PLAN:   Uncontrolled type 2 diabetes with insulin dependency -for not entirely clear reasons he is having a harder time controlling his diabetes.  He is noticing that his glucoses are higher and that he is needing increased doses of insulin to be able to control his diabetes.  He was set up with a continuous glucose monitor which has been eye-opening for him.  He is very hopeful about getting an insulin pump and has a meeting set up for discussion of this.  Today I did renew both his Humalog and Lantus since he is running out because he is using it more than he had been    Low back pain and likely lumbar radiculopathy -he has had a long history of low back pain likely related to workplace strain on his back.  This is not really been worked up in the past and now he is exhibiting symptoms of nerve impingement bilaterally but especially on the left.  Given the findings that are been seen on CT scans in the past I recommend that he get an MRI of his low back to assess the degree of impingement on the nerves.  This will be ordered and follow-up will be dictated by the results of this.      Number and complexity of problems:  1 unstable chronic illness or with exacerbation (diabetes)  1 undiagnosed new problem (low back pain)    Amount and Complexity of Data Reviewed/Analyzed:  2 Unique test reviewed  1 Unique test ordered    Risk of complication/morbidity of patient management:  Patient receiving prescription management      Telephone visit start time: 11:32a  Telephone visit end time: 11:52a    Originating Location (pt. Location): Home    Distant Location (provider location):  [facility]IC[facility] [place]     Mode of Communication:  Video Conference via Rashad Kirkpatrick MD, FACP

## 2021-07-16 NOTE — TELEPHONE ENCOUNTER
Call patient to arrange the MRI of his lumbar spine ordered placed by Dr Kirkpatrick at today's visit. Patient requesting for imaging phone number and states he will call to schedule own.

## 2021-07-16 NOTE — NURSING NOTE
Chief Complaint   Patient presents with     Diabetes     diabetic follow up per pt        MANNIE Dotson at 11:27 AM on 7/16/2021.

## 2021-07-17 RX ORDER — PEN NEEDLE, DIABETIC 32GX 5/32"
NEEDLE, DISPOSABLE MISCELLANEOUS
Qty: 400 EACH | Refills: 1 | Status: SHIPPED | OUTPATIENT
Start: 2021-07-17

## 2021-07-17 NOTE — TELEPHONE ENCOUNTER
9/14/2020  [facility] Endocrinology   Alisa West MD  Internal Medicine    insulin pen needle

## 2021-07-19 ENCOUNTER — TELEPHONE (OUTPATIENT)
Dept: ENDOCRINOLOGY | Facility: CLINIC | Age: 56
End: 2021-07-19

## 2021-07-19 ENCOUNTER — TELEPHONE (OUTPATIENT)
Dept: EDUCATION SERVICES | Facility: CLINIC | Age: 56
End: 2021-07-19

## 2021-07-19 NOTE — TELEPHONE ENCOUNTER
[facility] Call Center    Phone Message    May a detailed message be left on voicemail: yes     Reason for Call: Order(s): Other:     Reason for requested: Per Patient is wanting to know if able to get another dexcom to use till appt on 8/10/2021. Patient states Cyndy helped patient with a dexcom to use until appt on 7/27. Patient states the appt was moved and needing another dexcom to use till appt on 8/10/2021, please advise    Date needed: asap    Provider name: Juan      Action Taken: Message routed to:  [facility] ([facility] Endo    Travel Screening: Not Applicable

## 2021-07-22 ENCOUNTER — MYC MEDICAL ADVICE (OUTPATIENT)
Dept: INTERNAL MEDICINE | Facility: CLINIC | Age: 56
End: 2021-07-22

## 2021-07-22 DIAGNOSIS — M54.16 LUMBAR RADICULOPATHY: ICD-10-CM

## 2021-07-22 DIAGNOSIS — E11.42 DIABETIC POLYNEUROPATHY ASSOCIATED WITH TYPE 2 DIABETES MELLITUS (H): ICD-10-CM

## 2021-07-22 DIAGNOSIS — E11.21 TYPE 2 DIABETES MELLITUS WITH DIABETIC NEPHROPATHY, WITH LONG-TERM CURRENT USE OF INSULIN (H): ICD-10-CM

## 2021-07-22 DIAGNOSIS — M54.50 LOW BACK PAIN AT MULTIPLE SITES: ICD-10-CM

## 2021-07-22 DIAGNOSIS — Z79.4 TYPE 2 DIABETES MELLITUS WITH DIABETIC NEPHROPATHY, WITH LONG-TERM CURRENT USE OF INSULIN (H): ICD-10-CM

## 2021-07-26 RX ORDER — GABAPENTIN 300 MG/1
600 CAPSULE ORAL 3 TIMES DAILY
Qty: 540 CAPSULE | Refills: 3 | Status: SHIPPED | OUTPATIENT
Start: 2021-07-26 | End: 2022-06-06

## 2021-07-26 RX ORDER — INSULIN LISPRO 200 [IU]/ML
INJECTION, SOLUTION SUBCUTANEOUS
Qty: 45 ML | Refills: 3 | Status: SHIPPED | OUTPATIENT
Start: 2021-07-26 | End: 2022-04-06

## 2021-08-09 NOTE — PROGRESS NOTES
Outcome for 08/09/21 2:18 PM :Patient is sharing data via clinic device website and patient has been instructed to update data on website. Find login information by using [employer]  dexcom and not using the omnipod        Camacho is a 55 year old who is being evaluated via a billable telephone visit.      What phone number would you like to be contacted at? [phone]  How would you like to obtain your AVS? Alereonhart  Phone duration: 36 minutes    Due to the COVID 19 pandemic this visit was converted to a video visit in order to help prevent spread of infection in this patient and the general population.     Phone call start time: 5:58 pm   Phone call end time: 6:34    [facility]  Date: 08/10/2021    SUBJECTIVE:  Camacho Bhagat is a 55 year old male with PMHx of CASTAÑEDA/HCC s/p liver transplant (3/2017), fibromyalgia, DVT, HTN, RONNIE, OA, and CVA who presents for follow-up of his DM-II.     Type 2 DM was diagnosed in 2002.  Oral meds first, later placed on insulin since ~2007.     Related history: h/o CASTAÑEDA cirrhosis +/- work exposures, and HCC s/p liver transplant 3/2017. Course complicated by acute abdomen/neutropenic fever requiring right hemicolectomy and colostomy Fall 2017. He later underwent colostomy takedown 1/5/18 which was complicated by abdominal wall abscess at a drain site. During this course, he has also developed excessive proteinuria which is followed by nephrology.     He developed projectile vomiting on Victoza.     Recent DM visits:  Sept 2020: BG higher in am than pm on Lantus 55 qam, Novolog 1u:3g + 1:25 >150 ac and >200 qhs.  Advised split Lantus 50 qam and 10 qpm.  Advised CGMS.      Today:  He did start using Dexcom and he loves it!  He checks the blood sugar 10 - 12 times daily.  He started using overlay patch to keep it on.  Has been getting samples from [facility].      He has been wondering about starting Omnipod.  He has been worried about pulling tubes out  when hospitalized and also had a [employer] that he worries would pull out but would like to try Omnipod - was on their website.      Since 2nd COVID shot - 1 week later noticed BG rising and     He is a CMA and previously worked for [employer]  - and he saw their that BG rising after 2nd COVID shot.    Now taking more insulin and able to keep , but previously never above 130.    He saw Dr. Kirkpatrick and he said yes can do phone visit.      Neuropathy in feet worse Gabapentin increased to 600 mg tid - he just started this about two weeks ago but left foot kind of numb.  They were examined by Dr Kirkpatrick in June.           Current DM Regimen:  65 units of Lantus in the evening, at 10 units qam   1 unit NovoLog per 3 g carbohydrates for all meals and snacks.  (42 units Humalog in the morning, takes another 20 of Lantus, 20 - 22.)  Correction dose of 1 unit per 25 mg above 150 during the day and 200 at bedtime. He has been only doing it for  BG> 200 when he is not going to eat, mainly bedtime.     He has not had Dexcom since Jul 29 and thus not checking as frequently, nit giving Novolog as frequently but increasing Lantus dose at night. He is having frequent urination.  Takes diuretic twice daily.           Diet: 2-3 meals per day  Breakfast latest 9-11 am: varies; eats egg whites and turkey billy/sausage, or english muffins (26 grams), potatoes. He takes ~36 U for the 45-50 grams CHO.   Lunch: skips   Dinner 6-7:00 pm: often cooks at home; grilled chicken, fish, beef, lean pork, veggies and some potatoes, pasta or rice. He administers 30-34 U uinsulin for dinner.  Snacks: rarely; sometimes beef jerky, carrot, celery humus, crackers , fruit.  Alcohol or sugared beverages: no alcohol, no sugary drinks, mostly water     Exercise   Walks dog TID, around 1 mile/walk  Swims 2-3 times a week     Complications  + chronic complications.      1. Retinopathy: No known eye disease  Last eye exam was 11/12/19.  H/o DR. Fernández has  cataracts.       2.  Nephropathy: yes. CKD IIIa . He has significant proteinuria and f/up with nephrology.  Recent GFR in the 50s. He was started on losartan on 4/4/18 and the dose was increased to 50 mg daily. Discontinued losartan around 3/2019 due to hyperkalemia.     3. Neuropathy  Left foot tingling, mild. No ulcers or infection. No amputation.  He was evaluated by neurology and treatment with lipoic acid was recommended.  He reports some improvement.     4. Lipids: not on statin . last LDL in 30s    5. HTN: follows with nephrology on amlodipine and metoprolol both at max. BP today elevated.     6. Smoking: No    Review Of Systems  Answers for HPI/ROS submitted by the patient on 9/9/2020   General Symptoms: No  Skin Symptoms: No  HENT Symptoms: No  EYE SYMPTOMS: No  HEART SYMPTOMS: No  LUNG SYMPTOMS: No  INTESTINAL SYMPTOMS: No  URINARY SYMPTOMS: No  REPRODUCTIVE SYMPTOMS: No  SKELETAL SYMPTOMS: Yes  BLOOD SYMPTOMS: No  NERVOUS SYSTEM SYMPTOMS: Yes  MENTAL HEALTH SYMPTOMS: No  Back pain: Yes  Muscle aches: No  Neck pain: Yes  Swollen joints: No  Joint pain: Yes  Bone pain: No  Muscle cramps: Yes  Muscle weakness: No  Joint stiffness: Yes  Bone fracture: No  Trouble with coordination: No  Dizziness or trouble with balance: No  Fainting or black-out spells: No  Memory loss: No  Headache: Yes  Seizures: No  Speech problems: No  Tingling: Yes  Tremor: No  Weakness: No  Difficulty walking: Yes  Paralysis: No  Numbness: Yes      Past Medical History  Past Medical History:   Diagnosis Date     Depressive disorder, not elsewhere classified      Diabetes type 2, controlled (H) 11/10/2016     Esophageal reflux      Fibromyalgia 1/2009    dx with Dr Benitez( Rhe[facility])     Gangrene of finger (H) 8/25/2017     H/O deep venous thrombosis 11/2001    Permanent IVC filter in place.     H/O CASTAÑEDA (nonalcoholic steatohepatitis)      H/O Pneumonia, organism unspecified(486) 10/2001    Included ARDS, sepsis, and  acute renal failure;  hospitalized, required tracheostomy placement.     H/O: HTN (hypertension) 11/2001    No longer prescribed antihypertensive medication.       History of CVA (cerebrovascular accident)      History of hepatocellular carcinoma      History of liver transplant (H)      History of obstructive sleep apnea     No longer wears CPAP since losing approximately 200 pounds with his liver transplant and its complications.       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     Osteoarthritis      Presence of PERMANENT IVC filter      Rheumatoid arthritis(714.0)        Past Surgical History  Past Surgical History:   Procedure Laterality Date     BENCH LIVER N/A 3/4/2017    Procedure: BENCH LIVER;  Surgeon: Jovan Tran MD;  Location:  OR      TOTAL KNEE ARTHROPLASTY  2008    Right knee arthroscopy     CHOLECYSTECTOMY       COLONOSCOPY N/A 7/21/2017    Procedure: COMBINED COLONOSCOPY, SINGLE OR MULTIPLE BIOPSY/POLYPECTOMY BY BIOPSY;  Colonoscopy;  Surgeon: Izaiah Montes MD;  Location:  GI     ENDOSCOPIC RETROGRADE CHOLANGIOPANCREATOGRAM N/A 5/22/2018    Procedure: COMBINED ENDOSCOPIC RETROGRADE CHOLANGIOPANCREATOGRAPHY, PLACE TUBE/STENT;  Endoscopic Retrograde Cholangiopancreatography with sphincterotomy and pancreatic duct stent placement;  Surgeon: Zay Gaitan MD;  Location:  OR     ENT SURGERY      Repair of deviated septum     ESOPHAGOSCOPY, GASTROSCOPY, DUODENOSCOPY (EGD), COMBINED N/A 8/4/2016    Procedure: COMBINED ESOPHAGOSCOPY, GASTROSCOPY, DUODENOSCOPY (EGD), BIOPSY SINGLE OR MULTIPLE;  Surgeon: Trent Pederson MD;  Location:  GI     ESOPHAGOSCOPY, GASTROSCOPY, DUODENOSCOPY (EGD), COMBINED N/A 8/27/2017    Procedure: COMBINED ESOPHAGOSCOPY, GASTROSCOPY, DUODENOSCOPY (EGD);;  Surgeon: Los Wynn MD;   Location: [facility]     INCISION AND DRAINAGE ABDOMEN WASHOUT, COMBINED Right 2018    Procedure: COMBINED INCISION AND DRAINAGE ABDOMEN WASHOUT;  COMBINED INCISION AND DRAINAGE right ABDOMEN flank;  Surgeon: Sara Dinh MD;  Location: [facility] OR     LAPAROTOMY EXPLORATORY N/A 2017    Procedure: LAPAROTOMY EXPLORATORY;  Exploratory Laparotomy, washout;  Surgeon: Tip Zhang MD;  Location: [facility]     LAPAROTOMY EXPLORATORY N/A 2017    Procedure: LAPAROTOMY EXPLORATORY;  Exploratory Laparotomy, Washout with closure.;  Surgeon: Tip Zhang MD;  [facility]: [facility]     LAPAROTOMY EXPLORATORY N/A 2017    Procedure: LAPAROTOMY EXPLORATORY;  Exploratory Laparotomy, Right angelique-colectomy, end ileostomy, mucosal fistula, partial omentectomy;  Surgeon: Sara Dinh MD;  Location: [facility] OR     ORTHOPEDIC SURGERY Right     Repair of dislocated wrist.       RELEASE TRIGGER FINGER Right 11/10/2017    Procedure: RELEASE TRIGGER FINGER;  Debride, V-Y Flap Right Index Finger;  Surgeon: Momo Noonan MD;  [facility]     TAKEDOWN ILEOSTOMY N/A 2018    Procedure: TAKEDOWN ILEOSTOMY;  Exploratory Laparotomy, Lysis of Adhesions, Takedown Of End Ileostomy, Takedown of mucocutaneous fistula, ileocolic resection  And Colorectal Anastomosis, Primary repair of Ventral Hernia, Anesthesia Block ;  Surgeon: Sara Dinh MD;  Location: [facility]     TRACHEOSTOMY  2001    During hospitalization for pneumonia.       TRANSPLANT LIVER RECIPIENT  DONOR N/A 3/4/2017    Procedure: TRANSPLANT LIVER RECIPIENT  DONOR;  Surgeon: Jovan Tran MD;  Location: [facility]        Medications    Current Outpatient Medications:      alcohol swab prep pads, Use to swab area of injection/francisca as directed., Disp: 100 each, Rfl: 3     alpha-lipoic acid 600 MG capsule, Take 600 mg by mouth, Disp: , Rfl:      amLODIPine (NORVASC) 10 MG tablet, Take 1 tablet (10 mg) by mouth  daily, Disp: 90 tablet, Rfl: 3     blood glucose (NO BRAND SPECIFIED) test strip, Use to test blood sugar 3 times daily or as directed. Any covered brand preferred by Pt that works with meter., Disp: 300 strip, Rfl: 3     blood glucose calibration (NO BRAND SPECIFIED) solution, Use to calibrate meter., Disp: 1 Bottle, Rfl: 3     blood glucose monitoring (NO BRAND SPECIFIED) meter device kit, Use to test blood sugar 3 times daily or as directed. Any covered brand preferred by Pt., Disp: 1 kit, Rfl: 1     cholecalciferol (VITAMIN D3) 1000 UNIT tablet, Take 1 tablet (1,000 Units) by mouth daily (Patient taking differently: Take 1,000 Units by mouth every morning ), Disp: 100 tablet, Rfl: 3     CIALIS 5 MG tablet, Take 5 mg by mouth as needed , Disp: , Rfl: 1     cyclobenzaprine (FLEXERIL) 10 MG tablet, Take 1 tablet (10 mg) by mouth 3 times daily as needed for muscle spasms, Disp: 90 tablet, Rfl: 3     fluticasone (FLONASE) 50 MCG/ACT nasal spray, Spray 1 spray into both nostrils daily, Disp: 15 mL, Rfl: 1     furosemide (LASIX) 40 MG tablet, Take 1 tablet (40 mg) by mouth 2 times daily, Disp: 180 tablet, Rfl: 3     gabapentin (NEURONTIN) 300 MG capsule, Take 2 capsules (600 mg) by mouth 3 times daily, Disp: 540 capsule, Rfl: 3     Glucose Blood (BLOOD GLUCOSE TEST STRIPS) STRP, 1 strip by Lancet route 3 times daily, Disp: 300 each, Rfl: 3     insulin glargine (LANTUS SOLOSTAR) 100 UNIT/ML pen, Inject 65 Units Subcutaneous every morning, Disp: 60 mL, Rfl: 3     Insulin Lispro (HUMALOG KWIKPEN) 200 UNIT/ML soln, Use one unit per 3 grams carbohydrates for all meals and snacks. Total daily dose up to 100 U., Disp: 45 mL, Rfl: 3     insulin pen needle (BD ENE U/F) 32G X 4 MM miscellaneous, Use 1 pen needle 4 times daily or as directed., Disp: 400 each, Rfl: 1     metoprolol succinate ER (TOPROL-XL) 200 MG 24 hr tablet, Take 1 tablet (200 mg) by mouth daily, Disp: 90 tablet, Rfl: 3     multivitamin, therapeutic with  minerals (MULTI-VITAMIN) TABS tablet, Take 1 tablet by mouth every morning, Disp: , Rfl:      mycophenolic acid (GENERIC EQUIVALENT) 360 MG EC tablet, Take 2 tablets (720 mg) by mouth every 12 hours, Disp: 360 tablet, Rfl: 3     nicotine (NICODERM CQ) 14 MG/24HR 24 hr patch, Place 1 patch onto the skin every 24 hours, Disp: 28 patch, Rfl: 11     omeprazole (PRILOSEC) 20 MG DR capsule, Take 1 capsule (20 mg) by mouth daily, Disp: 90 capsule, Rfl: 3     oxyCODONE (ROXICODONE) 5 MG tablet, Take 1 tablet (5 mg) by mouth every 4 hours as needed for pain maximum 6 tablet(s) per day, Disp: 30 tablet, Rfl: 0     patiromer (VELTASSA) 8.4 g packet, Take 8.4 g by mouth daily, Disp: 90 each, Rfl: 3     predniSONE (DELTASONE) 2.5 MG tablet, Take 1 tablet (2.5 mg) by mouth daily, Disp: 90 tablet, Rfl: 3     sildenafil (REVATIO) 20 MG tablet, Take 2 tablets (40 mg) by mouth daily as needed (erectile dysfunction), Disp: 10 tablet, Rfl: 11     tacrolimus (GENERIC EQUIVALENT) 1 MG capsule, Take 4 capsules (4 mg) by mouth every 12 hours, Disp: 730 capsule, Rfl: 3     thin (NO BRAND SPECIFIED) lancets, Use with lanceting device. Any covered brand., Disp: 300 each, Rfl: 3     ursodiol (ACTIGALL) 500 MG tablet, Take 1 tablet (500 mg) by mouth 2 times daily, Disp: 180 tablet, Rfl: 3     varenicline (CHANTIX KEVEN) 0.5 MG X 11 & 1 MG X 42 tablet, Take 0.5 mg tab daily for 3 days, THEN 0.5 mg tab twice daily for 4 days, THEN 1 mg twice daily., Disp: 53 tablet, Rfl: 0    Family History   Problem Relation Age of Onset     Diabetes Father      Hypertension Father      Substance Abuse Father      Arthritis Mother      Thyroid Cancer Mother      Survivor!     Cervical Cancer Maternal Grandmother      Cerebrovascular Disease Maternal Grandfather      No Known Problems Paternal Grandmother      Prostate Cancer Paternal Grandfather      Colon Cancer No family hx of      Hyperlipidemia No family hx of      Coronary Artery Disease No family hx of       Breast Cancer No family hx of        Social history:  .  He used to work as a nurse at [employer].  Former smoker for 2 years, 0.75 packs/day, quit in 1988.  He used to chew tobacco and he stopped this in 2015.  He has not been drinking any alcohol since the liver transplant.    OBJECTIVE:  There were no vitals taken for this visit.  There is no height or weight on file to calculate BMI.   Objective:  Psych: Alert and oriented times 3; coherent speech, normal   rate and volume, able to articulate logical thoughts, able   to abstract reason, no tangential thoughts, no hallucinations   or delusions. his affect is normal.    I reviewed prior lab results documented in epic.    Recent Labs   Lab Test 06/08/21  1628 05/27/21  1729 03/31/21  1145 02/12/21  1331 11/24/20  1345 07/02/20  1255 05/08/20  1247 11/26/19  1222 11/18/19  0000 11/27/18  1128 11/19/18  0000 04/02/18  1042 10/27/17  1349 10/27/17  0942 04/11/16  1355 02/08/16  1144   A1C  --   --  6.3* 6.7* 6.4*   < >  --   --   --   --   --   --    < >  --    < >  --    HEMOGLOBINA1  --   --   --   --   --   --   --   --  5.8  --  6.1*  --   --   --   --   --    TSH  --   --   --   --   --   --   --   --   --   --   --  2.74  --   --   --  3.35   LDL  --   --   --   --  35  --  35   < >  --    < >  --  39   < >  --   --  61   HDL  --   --   --   --  39*  --  33*   < >  --    < >  --  36*   < >  --   --  60   TRIG  --   --   --   --  298*  --  365*   < >  --    < >  --  109   < >  --    < > 99   CR 1.51* 1.53* 1.56* 1.32* 1.42*  --  1.42*  --   --   --   --  1.31*  --   --   --  0.63*   MICROL  --   --   --   --   --   --   --   --   --   --   --   --   --  122  --   --     < > = values in this interval not displayed.     GFR Estimate   Date Value Ref Range Status   06/08/2021 51 (L) >60 mL/min/[1.73_m2] Final     Comment:     Non  GFR Calc  Starting 12/18/2018, serum creatinine based estimated GFR (eGFR) will be   calculated using the Chronic  "Kidney Disease Epidemiology Collaboration   (CKD-EPI) equation.     05/27/2021 50 (L) >60 mL/min/[1.73_m2] Final     Comment:     Non  GFR Calc  Starting 12/18/2018, serum creatinine based estimated GFR (eGFR) will be   calculated using the Chronic Kidney Disease Epidemiology Collaboration   (CKD-EPI) equation.     03/31/2021 49 (L) >60 mL/min/[1.73_m2] Final     Comment:     Non  GFR Calc  Starting 12/18/2018, serum creatinine based estimated GFR (eGFR) will be   calculated using the Chronic Kidney Disease Epidemiology Collaboration   (CKD-EPI) equation.         A/P   Camacho Bhagat is a 55 year old male who is here for a follow-up appointment.  His past medical history significant for fibromyalgia, DVT, nonalcoholic steatohepatosis and liver cancer/status post transplant, hypertension, obstructive sleep apnea, PAD, OA/RA and CVA.     Type 2 diabetes, diagnosed in 2002. Oral meds first, later placed on insulin since ~2007.  His diabetes is known to be complicated by triopathy, PAD and CVA.  His blood sugars are far above goal and he reports has been such since 2nd COVID vaccine in April.  He is finding Dexcom very helpful and giving Novolog more frequently when he has this, but it is not clear if can be covered by his insurer.  It appears he is doing a good deal of \"guesstimating\" doses and will need more education to get to goal.      Recommendations:  Check blood sugar before meals and at bedtime and give Novolog correction 1u:25 >150 before meals and >200 at bedtime.    Meet with diabetes education.  Keep written records of the carbohydrate intake, insulin dose administered  Schedule an appointment with the diabetes educator to assist with seeing if Dexcom of [place] can be covered. If not, continue qid + fingerstick.    For now, continue Lantus 65 qam and 20 u qpm as long as not leading to low BG.      Be sure to record BG and dose given so that doses can be adjusted as needed.    I " think that Omnipod is a good idea, particularly if completes pre pump education, can have Dexcom covered and could transition to closed loop system.      60 minutes in preparation for visit reviewing chart, labs and documentation, visiting with patient gathering history and in exam, education and counseling, as well as coordination of care, further chart review and documentation following visit on this date of service and as alluded to documented above.      RTC 3 months    It is my privilege to be involved in the care of the above patient.     Jenna De La Torre PA-C, MPAS  [facility]  Diabetes, Endocrinology, and Metabolism  [phone] Appointments/Nurse  [phone] Fax  [phone] pager  [phone] nurse line

## 2021-08-10 ENCOUNTER — VIRTUAL VISIT (OUTPATIENT)
Dept: ENDOCRINOLOGY | Facility: CLINIC | Age: 56
End: 2021-08-10
Payer: COMMERCIAL

## 2021-08-10 DIAGNOSIS — Z59.71 INSURANCE COVERAGE PROBLEMS: ICD-10-CM

## 2021-08-10 DIAGNOSIS — G62.9 PERIPHERAL POLYNEUROPATHY: ICD-10-CM

## 2021-08-10 DIAGNOSIS — E11.21 TYPE 2 DIABETES MELLITUS WITH DIABETIC NEPHROPATHY, WITH LONG-TERM CURRENT USE OF INSULIN (H): Primary | ICD-10-CM

## 2021-08-10 DIAGNOSIS — Z79.4 TYPE 2 DIABETES MELLITUS WITH DIABETIC NEPHROPATHY, WITH LONG-TERM CURRENT USE OF INSULIN (H): Primary | ICD-10-CM

## 2021-08-10 PROCEDURE — 99215 OFFICE O/P EST HI 40 MIN: CPT | Mod: 95 | Performed by: PHYSICIAN ASSISTANT

## 2021-08-10 RX ORDER — PROCHLORPERAZINE 25 MG/1
SUPPOSITORY RECTAL
Qty: 3 EACH | Refills: 11 | Status: SHIPPED | OUTPATIENT
Start: 2021-08-10 | End: 2021-08-12

## 2021-08-10 RX ORDER — PROCHLORPERAZINE 25 MG/1
SUPPOSITORY RECTAL
Qty: 1 EACH | Refills: 3 | Status: SHIPPED | OUTPATIENT
Start: 2021-08-10 | End: 2021-08-12

## 2021-08-10 NOTE — PATIENT INSTRUCTIONS
Dear Camacho,  I do think Dexcom will very helpful if can be covered by your insurer.  Please note your doses in Dexcom (log event)  so they can be used to adjust when needed and we can bring your blood sugar to goal of average 150.    Otherwise, your can check 4 times daily before meals and at bedtime and record doses on attached flow sheet.      Check blood sugar before meals and at bedtime and give Novolog correction 1u:25 >150 before meals and >200 at bedtime.    Meet with diabetes education.  Keep written records of the carbohydrate intake, insulin dose administered  Schedule an appointment with the diabetes educator to assist with seeing if Dexcom of [place] can be covered. If not, continue qid + fingerstick.    For now, continue Lantus 65 qam and 20 u qpm as long as not leading to low BG.      Be sure to record BG and dose given so that doses can be adjusted as needed.    Schedule return to clinic in 3 months, sooner if blood sugar rising further.   See CDE as soon as able.      My best wishes,    Jenna De La Torre PA-C, MPAS  [facility]  Diabetes, Endocrinology, and Metabolism  [phone] Appointments/Nurse  [phone] Fax  [phone] URGENTafter hours/weekend Endocrinologist on call

## 2021-08-10 NOTE — LETTER
8/10/2021       RE: Camacho Bhagat  [address]  [facility] [address]     Dear Colleague,    Thank you for referring your patient, Camacho Bhagat, to the [facility] [place] [facility]. Please see a copy of my visit note below.      Outcome for 08/09/21 2:18 PM :Patient is sharing data via clinic device website and patient has been instructed to update data on website. Find login information by using .[employer]  dexcom and not using the omnipod        Camacho is a 55 year old who is being evaluated via a billable telephone visit.      What phone number would you like to be contacted at? [phone]  How would you like to obtain your AVS? Endrat  Phone duration: 36 minutes    Due to the COVID 19 pandemic this visit was converted to a video visit in order to help prevent spread of infection in this patient and the general population.     Phone call start time: 5:58 pm   Phone call end time: 6:34    [facility]  Date: 08/10/2021    SUBJECTIVE:  Camacho Bhagat is a 55 year old male with PMHx of CASTAÑEDA/HCC s/p liver transplant (3/2017), fibromyalgia, DVT, HTN, RONNIE, OA, and CVA who presents for follow-up of his DM-II.     Type 2 DM was diagnosed in 2002.  Oral meds first, later placed on insulin since ~2007.     Related history: h/o CASTAÑEDA cirrhosis +/- work exposures, and HCC s/p liver transplant 3/2017. Course complicated by acute abdomen/neutropenic fever requiring right hemicolectomy and colostomy Fall 2017. He later underwent colostomy takedown 1/5/18 which was complicated by abdominal wall abscess at a drain site. During this course, he has also developed excessive proteinuria which is followed by nephrology.     He developed projectile vomiting on Victoza.     Recent DM visits:  Sept 2020: BG higher in am than pm on Lantus 55 qam, Novolog 1u:3g + 1:25 >150 ac and >200 qhs.  Advised split Lantus 50 qam and 10  qpm.  Advised CGMS.      Today:  He did start using Dexcom and he loves it!  He checks the blood sugar 10 - 12 times daily.  He started using overlay patch to keep it on.  Has been getting samples from [facility].      He has been wondering about starting Omnipod.  He has been worried about pulling tubes out when hospitalized and also had a [employer] that he worries would pull out but would like to try Omnipod - was on their website.      Since 2nd COVID shot - 1 week later noticed BG rising and     He is a CMA and previously worked for [employer]  - and he saw their that BG rising after 2nd COVID shot.    Now taking more insulin and able to keep , but previously never above 130.    He saw Dr. Kirkpatrick and he said yes can do phone visit.      Neuropathy in feet worse Gabapentin increased to 600 mg tid - he just started this about two weeks ago but left foot kind of numb.  They were examined by Dr Kirkpatrick in June.           Current DM Regimen:  65 units of Lantus in the evening, at 10 units qam   1 unit NovoLog per 3 g carbohydrates for all meals and snacks.  (42 units Humalog in the morning, takes another 20 of Lantus, 20 - 22.)  Correction dose of 1 unit per 25 mg above 150 during the day and 200 at bedtime. He has been only doing it for  BG> 200 when he is not going to eat, mainly bedtime.     He has not had Dexcom since Jul 29 and thus not checking as frequently, nit giving Novolog as frequently but increasing Lantus dose at night. He is having frequent urination.  Takes diuretic twice daily.           Diet: 2-3 meals per day  Breakfast latest 9-11 am: varies; eats egg whites and turkey billy/sausage, or english muffins (26 grams), potatoes. He takes ~36 U for the 45-50 grams CHO.   Lunch: skips   Dinner 6-7:00 pm: often cooks at home; grilled chicken, fish, beef, lean pork, veggies and some potatoes, pasta or rice. He administers 30-34 U uinsulin for dinner.  Snacks: rarely; sometimes beef jerky, carrot,  celery humus, crackers , fruit.  Alcohol or sugared beverages: no alcohol, no sugary drinks, mostly water     Exercise   Walks dog TID, around 1 mile/walk  Swims 2-3 times a week     Complications  + chronic complications.      1. Retinopathy: No known eye disease  Last eye exam was 11/12/19.  H/o DR. He has cataracts.       2.  Nephropathy: yes. CKD IIIa . He has significant proteinuria and f/up with nephrology.  Recent GFR in the 50s. He was started on losartan on 4/4/18 and the dose was increased to 50 mg daily. Discontinued losartan around 3/2019 due to hyperkalemia.     3. Neuropathy  Left foot tingling, mild. No ulcers or infection. No amputation.  He was evaluated by neurology and treatment with lipoic acid was recommended.  He reports some improvement.     4. Lipids: not on statin . last LDL in 30s    5. HTN: follows with nephrology on amlodipine and metoprolol both at max. BP today elevated.     6. Smoking: No    Review Of Systems  Answers for HPI/ROS submitted by the patient on 9/9/2020   General Symptoms: No  Skin Symptoms: No  HENT Symptoms: No  EYE SYMPTOMS: No  HEART SYMPTOMS: No  LUNG SYMPTOMS: No  INTESTINAL SYMPTOMS: No  URINARY SYMPTOMS: No  REPRODUCTIVE SYMPTOMS: No  SKELETAL SYMPTOMS: Yes  BLOOD SYMPTOMS: No  NERVOUS SYSTEM SYMPTOMS: Yes  MENTAL HEALTH SYMPTOMS: No  Back pain: Yes  Muscle aches: No  Neck pain: Yes  Swollen joints: No  Joint pain: Yes  Bone pain: No  Muscle cramps: Yes  Muscle weakness: No  Joint stiffness: Yes  Bone fracture: No  Trouble with coordination: No  Dizziness or trouble with balance: No  Fainting or black-out spells: No  Memory loss: No  Headache: Yes  Seizures: No  Speech problems: No  Tingling: Yes  Tremor: No  Weakness: No  Difficulty walking: Yes  Paralysis: No  Numbness: Yes      Past Medical History  Past Medical History:   Diagnosis Date     Depressive disorder, not elsewhere classified      Diabetes type 2, controlled (H) 11/10/2016     Esophageal reflux       Fibromyalgia 1/2009    dx with Dr Benitez( Rheum)     Gangrene of finger (H) 8/25/2017     H/O deep venous thrombosis 11/2001    Permanent IVC filter in place.     H/O CASTAÑEDA (nonalcoholic steatohepatitis)      H/O Pneumonia, organism unspecified(486) 10/2001    Included ARDS, sepsis, and  acute renal failure; hospitalized, required tracheostomy placement.     H/O: HTN (hypertension) 11/2001    No longer prescribed antihypertensive medication.       History of CVA (cerebrovascular accident)      History of hepatocellular carcinoma      History of liver transplant (H)      History of obstructive sleep apnea     No longer wears CPAP since losing approximately 200 pounds with his liver transplant and its complications.       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     Osteoarthritis      Presence of PERMANENT IVC filter      Rheumatoid arthritis(714.0)        Past Surgical History  Past Surgical History:   Procedure Laterality Date     BENCH LIVER N/A 3/4/2017    Procedure: BENCH LIVER;  Surgeon: Jovan Tran MD;  Location: [facility] TOTAL KNEE ARTHROPLASTY  2008    Right knee arthroscopy     CHOLECYSTECTOMY       COLONOSCOPY N/A 7/21/2017    Procedure: COMBINED COLONOSCOPY, SINGLE OR MULTIPLE BIOPSY/POLYPECTOMY BY BIOPSY;  Colonoscopy;  Surgeon: Izaiah Montes MD;  Location:  GI     ENDOSCOPIC RETROGRADE CHOLANGIOPANCREATOGRAM N/A 5/22/2018    Procedure: COMBINED ENDOSCOPIC RETROGRADE CHOLANGIOPANCREATOGRAPHY, PLACE TUBE/STENT;  Endoscopic Retrograde Cholangiopancreatography with sphincterotomy and pancreatic duct stent placement;  Surgeon: Zay Gaitan MD;  Location:  OR     ENT SURGERY      Repair of deviated septum     ESOPHAGOSCOPY, GASTROSCOPY, DUODENOSCOPY (EGD), COMBINED N/A 8/4/2016    Procedure: COMBINED  ESOPHAGOSCOPY, GASTROSCOPY, DUODENOSCOPY (EGD), BIOPSY SINGLE OR MULTIPLE;  Surgeon: Trent Pederson MD;  Location: [facility] GI     ESOPHAGOSCOPY, GASTROSCOPY, DUODENOSCOPY (EGD), COMBINED N/A 2017    Procedure: COMBINED ESOPHAGOSCOPY, GASTROSCOPY, DUODENOSCOPY (EGD);;  Surgeon: Los Wynn MD;  Location: [facility]     INCISION AND DRAINAGE ABDOMEN WASHOUT, COMBINED Right 2018    Procedure: COMBINED INCISION AND DRAINAGE ABDOMEN WASHOUT;  COMBINED INCISION AND DRAINAGE right ABDOMEN flank;  Surgeon: Sara Dinh MD;  Location: [facility] OR     LAPAROTOMY EXPLORATORY N/A 2017    Procedure: LAPAROTOMY EXPLORATORY;  Exploratory Laparotomy, washout;  Surgeon: Tip Zhang MD;  Location: [facility] OR     LAPAROTOMY EXPLORATORY N/A 2017    Procedure: LAPAROTOMY EXPLORATORY;  Exploratory Laparotomy, Washout with closure.;  Surgeon: Tip Zhang MD;  Location: [facility]     LAPAROTOMY EXPLORATORY N/A 2017    Procedure: LAPAROTOMY EXPLORATORY;  Exploratory Laparotomy, Right angelique-colectomy, end ileostomy, mucosal fistula, partial omentectomy;  Surgeon: Sara Dinh MD;  Location: [facility] OR     ORTHOPEDIC SURGERY Right     Repair of dislocated wrist.       RELEASE TRIGGER FINGER Right 11/10/2017    Procedure: RELEASE TRIGGER FINGER;  Debride, V-Y Flap Right Index Finger;  Surgeon: Momo Noonan MD;  [facility]: [facility]     TAKEDOWN ILEOSTOMY N/A 2018    Procedure: TAKEDOWN ILEOSTOMY;  Exploratory Laparotomy, Lysis of Adhesions, Takedown Of End Ileostomy, Takedown of mucocutaneous fistula, ileocolic resection  And Colorectal Anastomosis, Primary repair of Ventral Hernia, Anesthesia Block ;  Surgeon: Sara Dinh MD;  Location: [facility] OR     TRACHEOSTOMY  2001    During hospitalization for pneumonia.       TRANSPLANT LIVER RECIPIENT  DONOR N/A 3/4/2017    Procedure: TRANSPLANT LIVER RECIPIENT  DONOR;  Surgeon: Jovan Tran MD;   Location: [facility] OR        Medications    Current Outpatient Medications:      alcohol swab prep pads, Use to swab area of injection/francisca as directed., Disp: 100 each, Rfl: 3     alpha-lipoic acid 600 MG capsule, Take 600 mg by mouth, Disp: , Rfl:      amLODIPine (NORVASC) 10 MG tablet, Take 1 tablet (10 mg) by mouth daily, Disp: 90 tablet, Rfl: 3     blood glucose (NO BRAND SPECIFIED) test strip, Use to test blood sugar 3 times daily or as directed. Any covered brand preferred by Pt that works with meter., Disp: 300 strip, Rfl: 3     blood glucose calibration (NO BRAND SPECIFIED) solution, Use to calibrate meter., Disp: 1 Bottle, Rfl: 3     blood glucose monitoring (NO BRAND SPECIFIED) meter device kit, Use to test blood sugar 3 times daily or as directed. Any covered brand preferred by Pt., Disp: 1 kit, Rfl: 1     cholecalciferol (VITAMIN D3) 1000 UNIT tablet, Take 1 tablet (1,000 Units) by mouth daily (Patient taking differently: Take 1,000 Units by mouth every morning ), Disp: 100 tablet, Rfl: 3     CIALIS 5 MG tablet, Take 5 mg by mouth as needed , Disp: , Rfl: 1     cyclobenzaprine (FLEXERIL) 10 MG tablet, Take 1 tablet (10 mg) by mouth 3 times daily as needed for muscle spasms, Disp: 90 tablet, Rfl: 3     fluticasone (FLONASE) 50 MCG/ACT nasal spray, Spray 1 spray into both nostrils daily, Disp: 15 mL, Rfl: 1     furosemide (LASIX) 40 MG tablet, Take 1 tablet (40 mg) by mouth 2 times daily, Disp: 180 tablet, Rfl: 3     gabapentin (NEURONTIN) 300 MG capsule, Take 2 capsules (600 mg) by mouth 3 times daily, Disp: 540 capsule, Rfl: 3     Glucose Blood (BLOOD GLUCOSE TEST STRIPS) STRP, 1 strip by Lancet route 3 times daily, Disp: 300 each, Rfl: 3     insulin glargine (LANTUS SOLOSTAR) 100 UNIT/ML pen, Inject 65 Units Subcutaneous every morning, Disp: 60 mL, Rfl: 3     Insulin Lispro (HUMALOG KWIKPEN) 200 UNIT/ML soln, Use one unit per 3 grams carbohydrates for all meals and snacks. Total daily dose up to 100 U.,  Disp: 45 mL, Rfl: 3     insulin pen needle (BD ENE U/F) 32G X 4 MM miscellaneous, Use 1 pen needle 4 times daily or as directed., Disp: 400 each, Rfl: 1     metoprolol succinate ER (TOPROL-XL) 200 MG 24 hr tablet, Take 1 tablet (200 mg) by mouth daily, Disp: 90 tablet, Rfl: 3     multivitamin, therapeutic with minerals (MULTI-VITAMIN) TABS tablet, Take 1 tablet by mouth every morning, Disp: , Rfl:      mycophenolic acid (GENERIC EQUIVALENT) 360 MG EC tablet, Take 2 tablets (720 mg) by mouth every 12 hours, Disp: 360 tablet, Rfl: 3     nicotine (NICODERM CQ) 14 MG/24HR 24 hr patch, Place 1 patch onto the skin every 24 hours, Disp: 28 patch, Rfl: 11     omeprazole (PRILOSEC) 20 MG DR capsule, Take 1 capsule (20 mg) by mouth daily, Disp: 90 capsule, Rfl: 3     oxyCODONE (ROXICODONE) 5 MG tablet, Take 1 tablet (5 mg) by mouth every 4 hours as needed for pain maximum 6 tablet(s) per day, Disp: 30 tablet, Rfl: 0     patiromer (VELTASSA) 8.4 g packet, Take 8.4 g by mouth daily, Disp: 90 each, Rfl: 3     predniSONE (DELTASONE) 2.5 MG tablet, Take 1 tablet (2.5 mg) by mouth daily, Disp: 90 tablet, Rfl: 3     sildenafil (REVATIO) 20 MG tablet, Take 2 tablets (40 mg) by mouth daily as needed (erectile dysfunction), Disp: 10 tablet, Rfl: 11     tacrolimus (GENERIC EQUIVALENT) 1 MG capsule, Take 4 capsules (4 mg) by mouth every 12 hours, Disp: 730 capsule, Rfl: 3     thin (NO BRAND SPECIFIED) lancets, Use with lanceting device. Any covered brand., Disp: 300 each, Rfl: 3     ursodiol (ACTIGALL) 500 MG tablet, Take 1 tablet (500 mg) by mouth 2 times daily, Disp: 180 tablet, Rfl: 3     varenicline (CHANTIX KEVEN) 0.5 MG X 11 & 1 MG X 42 tablet, Take 0.5 mg tab daily for 3 days, THEN 0.5 mg tab twice daily for 4 days, THEN 1 mg twice daily., Disp: 53 tablet, Rfl: 0    Family History   Problem Relation Age of Onset     Diabetes Father      Hypertension Father      Substance Abuse Father      Arthritis Mother      Thyroid Cancer Mother       Survivor!     Cervical Cancer Maternal Grandmother      Cerebrovascular Disease Maternal Grandfather      No Known Problems Paternal Grandmother      Prostate Cancer Paternal Grandfather      Colon Cancer No family hx of      Hyperlipidemia No family hx of      Coronary Artery Disease No family hx of      Breast Cancer No family hx of        Social history:  .  He used to work as a nurse at [employer].  Former smoker for 2 years, 0.75 packs/day, quit in 1988.  He used to chew tobacco and he stopped this in 2015.  He has not been drinking any alcohol since the liver transplant.    OBJECTIVE:  There were no vitals taken for this visit.  There is no height or weight on file to calculate BMI.   Objective:  Psych: Alert and oriented times 3; coherent speech, normal   rate and volume, able to articulate logical thoughts, able   to abstract reason, no tangential thoughts, no hallucinations   or delusions. his affect is normal.    I reviewed prior lab results documented in epic.    Recent Labs   Lab Test 06/08/21  1628 05/27/21  1729 03/31/21  1145 02/12/21  1331 11/24/20  1345 07/02/20  1255 05/08/20  1247 11/26/19  1222 11/18/19  0000 11/27/18  1128 11/19/18  0000 04/02/18  1042 10/27/17  1349 10/27/17  0942 04/11/16  1355 02/08/16  1144   A1C  --   --  6.3* 6.7* 6.4*   < >  --   --   --   --   --   --    < >  --    < >  --    HEMOGLOBINA1  --   --   --   --   --   --   --   --  5.8  --  6.1*  --   --   --   --   --    TSH  --   --   --   --   --   --   --   --   --   --   --  2.74  --   --   --  3.35   LDL  --   --   --   --  35  --  35   < >  --    < >  --  39   < >  --   --  61   HDL  --   --   --   --  39*  --  33*   < >  --    < >  --  36*   < >  --   --  60   TRIG  --   --   --   --  298*  --  365*   < >  --    < >  --  109   < >  --    < > 99   CR 1.51* 1.53* 1.56* 1.32* 1.42*  --  1.42*  --   --   --   --  1.31*  --   --   --  0.63*   MICROL  --   --   --   --   --   --   --   --   --   --   --   --   --   "122  --   --     < > = values in this interval not displayed.     GFR Estimate   Date Value Ref Range Status   06/08/2021 51 (L) >60 mL/min/[1.73_m2] Final     Comment:     Non  GFR Calc  Starting 12/18/2018, serum creatinine based estimated GFR (eGFR) will be   calculated using the Chronic Kidney Disease Epidemiology Collaboration   (CKD-EPI) equation.     05/27/2021 50 (L) >60 mL/min/[1.73_m2] Final     Comment:     Non  GFR Calc  Starting 12/18/2018, serum creatinine based estimated GFR (eGFR) will be   calculated using the Chronic Kidney Disease Epidemiology Collaboration   (CKD-EPI) equation.     03/31/2021 49 (L) >60 mL/min/[1.73_m2] Final     Comment:     Non  GFR Calc  Starting 12/18/2018, serum creatinine based estimated GFR (eGFR) will be   calculated using the Chronic Kidney Disease Epidemiology Collaboration   (CKD-EPI) equation.         A/P   Camacho Bhagat is a 55 year old male who is here for a follow-up appointment.  His past medical history significant for fibromyalgia, DVT, nonalcoholic steatohepatosis and liver cancer/status post transplant, hypertension, obstructive sleep apnea, PAD, OA/RA and CVA.     Type 2 diabetes, diagnosed in 2002. Oral meds first, later placed on insulin since ~2007.  His diabetes is known to be complicated by triopathy, PAD and CVA.  His blood sugars are far above goal and he reports has been such since 2nd COVID vaccine in April.  He is finding Dexcom very helpful and giving Novolog more frequently when he has this, but it is not clear if can be covered by his insurer.  It appears he is doing a good deal of \"guesstimating\" doses and will need more education to get to goal.      Recommendations:  Check blood sugar before meals and at bedtime and give Novolog correction 1u:25 >150 before meals and >200 at bedtime.    Meet with diabetes education.  Keep written records of the carbohydrate intake, insulin dose " administered  Schedule an appointment with the diabetes educator to assist with seeing if Dexcom of Huma can be covered. If not, continue qid + fingerstick.    For now, continue Lantus 65 qam and 20 u qpm as long as not leading to low BG.      Be sure to record BG and dose given so that doses can be adjusted as needed.    I think that Omnipod is a good idea, particularly if completes pre pump education, can have Dexcom covered and could transition to closed loop system.      60 minutes in preparation for visit reviewing chart, labs and documentation, visiting with patient gathering history and in exam, education and counseling, as well as coordination of care, further chart review and documentation following visit on this date of service and as alluded to documented above.      RTC 3 months    It is my privilege to be involved in the care of the above patient.     Jenna De La Torre PA-C, MPAS  [facility]  Diabetes, Endocrinology, and Metabolism  [phone] Appointments/Nurse  [phone] Fax  [phone] pager  [phone] nurse line

## 2021-08-12 DIAGNOSIS — E11.21 TYPE 2 DIABETES MELLITUS WITH DIABETIC NEPHROPATHY, WITH LONG-TERM CURRENT USE OF INSULIN (H): ICD-10-CM

## 2021-08-12 DIAGNOSIS — Z79.4 TYPE 2 DIABETES MELLITUS WITH DIABETIC NEPHROPATHY, WITH LONG-TERM CURRENT USE OF INSULIN (H): ICD-10-CM

## 2021-08-12 RX ORDER — PROCHLORPERAZINE 25 MG/1
SUPPOSITORY RECTAL
Qty: 1 EACH | Refills: 3 | Status: SHIPPED | OUTPATIENT
Start: 2021-08-12 | End: 2022-07-07

## 2021-08-12 RX ORDER — PROCHLORPERAZINE 25 MG/1
SUPPOSITORY RECTAL
Qty: 3 EACH | Refills: 11 | Status: SHIPPED | OUTPATIENT
Start: 2021-08-12 | End: 2021-08-12

## 2021-08-12 RX ORDER — PROCHLORPERAZINE 25 MG/1
SUPPOSITORY RECTAL
Qty: 9 EACH | Refills: 3 | Status: SHIPPED | OUTPATIENT
Start: 2021-08-12 | End: 2022-07-07

## 2021-08-12 NOTE — TELEPHONE ENCOUNTER
[facility] Call Center    Phone Message    May a detailed message be left on voicemail: yes     Reason for Call: Other: Pharmacy received wrong prescription and the pharmacy and the pharmacy needs to get an approval to fill this prescription order. Please call to discuss further.       Action Taken: Message routed to:  [facility] ENDO    Travel Screening: Not Applicable

## 2021-08-12 NOTE — TELEPHONE ENCOUNTER
[facility] Call Center    Phone Message    May a detailed message be left on voicemail: yes     Reason for Call: Other: Pt calling to discuss that his dexcom and sensors were only written for a 30 day supply instead of a 90 day supply. The pharmacy called the pt and called the clinic. Please call pt when this is been fixed with the pharmacy. Thank you.     Action Taken: Message routed to:  [facility] Endocrine    Travel Screening: Not Applicable

## 2021-08-12 NOTE — TELEPHONE ENCOUNTER
Continuous Blood Gluc Sensor (DEXCOM G6 SENSOR) MISC  Last Written Prescription Date:  8/10/2021  Last Fill Quantity: 3,   # refills: 11  Last Office Visit :  8/10/2021  Future Office visit:  None  Routing refill request to provider for review/approval because:  Med not on protocol  Please resend order to updated pharmacy close to Pt's home.  Pt is out of med and does not want to wait another week.       Continuous Blood Gluc Transmit (DEXCOM G6 TRANSMITTER) MISC  Last Written Prescription Date:  8/10/2021  Last Fill Quantity: 1,   # refills: 3  Last Office Visit :  8/10/2021  Future Office visit:  None  Routing refill request to provider for review/approval because:  Med not on protocol  Please resend order to updated pharmacy close to Pt's home.  Pt is out of med and does not want to wait another week.     Alisa Vega RN  Central Triage Red Flags/Med Refills

## 2021-08-12 NOTE — TELEPHONE ENCOUNTER
[facility] Call Center    Phone Message    May a detailed message be left on voicemail: yes     Reason for Call: Medication Question or concern regarding medication   Prescription Clarification  Name of Medication: Continuous Blood Gluc Sensor (DEXCOM G6 SENSOR) MISC & Continuous Blood Gluc Transmit (DEXCOM G6 TRANSMITTER) MISC   Prescribing Provider: Britt   Pharmacy: [facility]Y #[id number] - [place], [place] - [address] [place] [address]   What on the order needs clarification? Solane from [facility] stated pt would like medications sent to [facility] instead as a 90 day supply.  Pt is out of Rx and doesn't want to wait another week for meds to be mailed.  Please send prescriptions ASAP.          Action Taken: Message routed to:  [facility] endo    Travel Screening: Not Applicable

## 2021-08-18 ENCOUNTER — LAB (OUTPATIENT)
Dept: LAB | Facility: CLINIC | Age: 56
End: 2021-08-18
Payer: COMMERCIAL

## 2021-08-18 DIAGNOSIS — Z94.4 LIVER REPLACED BY TRANSPLANT (H): ICD-10-CM

## 2021-08-18 DIAGNOSIS — Z13.220 LIPID SCREENING: ICD-10-CM

## 2021-08-18 DIAGNOSIS — E11.42 TYPE 2 DIABETES MELLITUS WITH DIABETIC POLYNEUROPATHY, WITH LONG-TERM CURRENT USE OF INSULIN (H): ICD-10-CM

## 2021-08-18 DIAGNOSIS — N18.31 ANEMIA OF CHRONIC RENAL FAILURE, STAGE 3A (H): ICD-10-CM

## 2021-08-18 DIAGNOSIS — N18.31 STAGE 3A CHRONIC KIDNEY DISEASE (H): ICD-10-CM

## 2021-08-18 DIAGNOSIS — D63.1 ANEMIA OF CHRONIC RENAL FAILURE, STAGE 3A (H): ICD-10-CM

## 2021-08-18 DIAGNOSIS — Z79.4 TYPE 2 DIABETES MELLITUS WITH DIABETIC POLYNEUROPATHY, WITH LONG-TERM CURRENT USE OF INSULIN (H): ICD-10-CM

## 2021-08-18 LAB
ALBUMIN SERPL-MCNC: 3.5 G/DL (ref 3.4–5)
ALP SERPL-CCNC: 200 U/L (ref 40–150)
ALT SERPL W P-5'-P-CCNC: 34 U/L (ref 0–70)
ANION GAP SERPL CALCULATED.3IONS-SCNC: 5 MMOL/L (ref 3–14)
AST SERPL W P-5'-P-CCNC: 26 U/L (ref 0–45)
BILIRUB DIRECT SERPL-MCNC: 0.2 MG/DL (ref 0–0.2)
BILIRUB SERPL-MCNC: 0.6 MG/DL (ref 0.2–1.3)
BUN SERPL-MCNC: 27 MG/DL (ref 7–30)
CALCIUM SERPL-MCNC: 9 MG/DL (ref 8.5–10.1)
CHLORIDE BLD-SCNC: 110 MMOL/L (ref 94–109)
CO2 SERPL-SCNC: 25 MMOL/L (ref 20–32)
CREAT SERPL-MCNC: 1.31 MG/DL (ref 0.66–1.25)
ERYTHROCYTE [DISTWIDTH] IN BLOOD BY AUTOMATED COUNT: 13.7 % (ref 10–15)
GFR SERPL CREATININE-BSD FRML MDRD: 60 ML/MIN/1.73M2
GLUCOSE BLD-MCNC: 130 MG/DL (ref 70–99)
HBA1C MFR BLD: 7.1 % (ref 0–5.6)
HCT VFR BLD AUTO: 35.4 % (ref 40–53)
HGB BLD-MCNC: 11.6 G/DL (ref 13.3–17.7)
MCH RBC QN AUTO: 31.3 PG (ref 26.5–33)
MCHC RBC AUTO-ENTMCNC: 32.8 G/DL (ref 31.5–36.5)
MCV RBC AUTO: 95 FL (ref 78–100)
PHOSPHATE SERPL-MCNC: 2.1 MG/DL (ref 2.5–4.5)
PLATELET # BLD AUTO: 106 10E3/UL (ref 150–450)
POTASSIUM BLD-SCNC: 4.9 MMOL/L (ref 3.4–5.3)
PROT SERPL-MCNC: 6.7 G/DL (ref 6.8–8.8)
RBC # BLD AUTO: 3.71 10E6/UL (ref 4.4–5.9)
SODIUM SERPL-SCNC: 140 MMOL/L (ref 133–144)
TACROLIMUS BLD-MCNC: 4.9 UG/L (ref 5–15)
TME LAST DOSE: ABNORMAL H
TME LAST DOSE: ABNORMAL H
WBC # BLD AUTO: 6 10E3/UL (ref 4–11)

## 2021-08-18 PROCEDURE — 80197 ASSAY OF TACROLIMUS: CPT

## 2021-08-18 PROCEDURE — 82248 BILIRUBIN DIRECT: CPT

## 2021-08-18 PROCEDURE — 84100 ASSAY OF PHOSPHORUS: CPT

## 2021-08-18 PROCEDURE — 36415 COLL VENOUS BLD VENIPUNCTURE: CPT

## 2021-08-18 PROCEDURE — 85027 COMPLETE CBC AUTOMATED: CPT

## 2021-08-18 PROCEDURE — 83036 HEMOGLOBIN GLYCOSYLATED A1C: CPT

## 2021-08-23 NOTE — RESULT ENCOUNTER NOTE
The A1c has been a little higher. You are scheduled to meet with the diabetes educator.  Please let me know if you have questions.

## 2021-08-26 ENCOUNTER — MYC MEDICAL ADVICE (OUTPATIENT)
Dept: INTERNAL MEDICINE | Facility: CLINIC | Age: 56
End: 2021-08-26

## 2021-08-26 DIAGNOSIS — M15.0 PRIMARY OSTEOARTHRITIS INVOLVING MULTIPLE JOINTS: ICD-10-CM

## 2021-08-30 RX ORDER — OXYCODONE HYDROCHLORIDE 5 MG/1
5 TABLET ORAL EVERY 4 HOURS PRN
Qty: 30 TABLET | Refills: 0 | Status: SHIPPED | OUTPATIENT
Start: 2021-08-30 | End: 2021-10-28

## 2021-08-30 NOTE — PLAN OF CARE
Problem: Goal Outcome Summary  Goal: Goal Outcome Summary  Outcome: No Change  AVSS on RA. Pt disoriented to time and situation. BS range 125-143. Denies pain and nausea. PICC with insulin gtt and prograf @ 5.5ml/hr. Old drain on right abdominal irrigated with 20cc, draining minimal OP. New drain irrigated with 10cc, with 400cc of OP. Midline fistula dressing c/d/i. Ileostomy with liquid brown OP. Pt incontinent of urine. Pt becomes very agitated when bed needs to be changed. Coccyx dressing c/d/i.  Pt had abdominal CT this evening. J-tube clamped, clarified with on-call TFs are not to be restated tonight. Will continue to monitor. Call light in reach, continue with POC.        No

## 2021-08-30 NOTE — TELEPHONE ENCOUNTER
Reason For Call:        Chief Complaint   Patient presents with     Refill Request                 Medication Name, Dose and Monthly Quantity:   oxyCODONE (ROXICODONE) 5 MG tablet    Diagnosis requiring opiates:   Primary osteoarthritis involving multiple joints      Problem List Updated:   Yes     Opioid Agreement On File - [facility] PAIN CONTRACT ID# [id number]:       Last Urine Drug Screen (at least once every 12 months) Date:     Unexpected Results:        [place]  Data Reviewed (at least once every 3 months) Date:   06/03/19  Unexpected Results:         Last Fill Date:   05/28/520  Next Fill Date:   08/30/2021  Last Visit with PCP:   12/07/2020    Future Visits with PCP:    Nothing scheduled  Processing:     RO Banegas RN at 1:36 PM on 8/30/2021.

## 2021-08-31 DIAGNOSIS — Z94.4 HISTORY OF LIVER TRANSPLANT (H): ICD-10-CM

## 2021-08-31 DIAGNOSIS — Z79.60 LONG-TERM USE OF IMMUNOSUPPRESSANT MEDICATION: ICD-10-CM

## 2021-08-31 RX ORDER — MYCOPHENOLIC ACID 360 MG/1
720 TABLET, DELAYED RELEASE ORAL EVERY 12 HOURS
Qty: 360 TABLET | Refills: 3 | Status: SHIPPED | OUTPATIENT
Start: 2021-08-31 | End: 2022-09-12

## 2021-09-03 ENCOUNTER — ALLIED HEALTH/NURSE VISIT (OUTPATIENT)
Dept: EDUCATION SERVICES | Facility: CLINIC | Age: 56
End: 2021-09-03
Payer: COMMERCIAL

## 2021-09-03 DIAGNOSIS — Z79.4 TYPE 2 DIABETES MELLITUS WITH DIABETIC NEPHROPATHY, WITH LONG-TERM CURRENT USE OF INSULIN (H): ICD-10-CM

## 2021-09-03 DIAGNOSIS — E11.21 TYPE 2 DIABETES MELLITUS WITH DIABETIC NEPHROPATHY, WITH LONG-TERM CURRENT USE OF INSULIN (H): ICD-10-CM

## 2021-09-03 PROCEDURE — G0108 DIAB MANAGE TRN  PER INDIV: HCPCS

## 2021-09-03 RX ORDER — INSULIN PUMP CONTROLLER
1 EACH MISCELLANEOUS
Qty: 45 EACH | Refills: 3 | Status: SHIPPED | OUTPATIENT
Start: 2021-09-03 | End: 2021-09-08

## 2021-09-03 NOTE — Clinical Note
Hello,  Do these pump calculations look okay?  Also, do you want to go with Humalog U200 in the pump?  I think it is considered off-label but we do it.    Let me know.  I told Camacho I would get back to him next week.  Thanks!  Arely

## 2021-09-03 NOTE — PROGRESS NOTES
DIABETES EDUCATION NOTE:    Camacho Bhagat presents today for education related to Type 2 diabetes.    Patient is being treated with:  insulin and DexCom  He is accompanied by self    PATIENT CONCERNS RELATED TO DIABETES SELF MANAGEMENT:   He would like an Omnipod insulin pump    ASSESSMENT: type 2 diabetes    Current Diabetes Management per Patient:  Taking diabetes medications?   yes:     Diabetes Medication(s)     Insulin       insulin glargine (LANTUS SOLOSTAR) 100 UNIT/ML pen    Inject 65 Units Subcutaneous every morning     Insulin Lispro (HUMALOG KWIKPEN) 200 UNIT/ML soln    Use one unit per 3 grams carbohydrates for all meals and snacks. Total daily dose up to 100 U.        Monitoring  Patient glucose self monitoring as follows: per Dexcom.   BG results:       Patient's most recent   Lab Results   Component Value Date    A1C 7.1 08/18/2021    A1C 6.3 03/31/2021    is meeting goal of <7.0    Hypoglycemia:   Patient is at risk of hypoglycemia? Yes                   EDUCATION and INSTRUCTION PROVIDED AT THIS VISIT:    Camacho has determined that he wants an Omnipod Insulin pump.  He has checked with Soha Hoang from [employer] and checked it with his insurance.  It will not cost him anything.    Camacho has done his homework.  He has watched several videos and researched the Omnipod extensively.  He has a good working knowledge of a basal/ bolus insulin regimen and understands he will go off his long acting insulin when he starts the pump.  Camacho feels very confident with his carb counting.    Camacho reports and average TDD of 100 units.  We calculated out pump rates based on that dose.    Basal rate: 1.5   ICR: 6 grams  CF: 22 mg/dl  Active insulin time: 3 hours    Camacho is using U200 Humalog.  He is wondering if he would use that in the pump.  Will check with his doctor on that and let him know.    Patient-stated goal written and given to Camacho Bhagat.  Verbalized and demonstrated understanding of instructions.      PLAN:  See patient instructions  AVS printed and given to patient    FOLLOW-UP:    Time spent with patient at today's visit was 30 minutes.      Any diabetes medication dose changes were made via the CDE Protocol and Collaborative Practice Agreement with Joann and  Sherita.  A copy of this encounter was provided to patient's referring provider.

## 2021-09-04 ENCOUNTER — HEALTH MAINTENANCE LETTER (OUTPATIENT)
Age: 56
End: 2021-09-04

## 2021-09-08 ENCOUNTER — DOCUMENTATION ONLY (OUTPATIENT)
Dept: ENDOCRINOLOGY | Facility: CLINIC | Age: 56
End: 2021-09-08

## 2021-09-08 ENCOUNTER — MEDICAL CORRESPONDENCE (OUTPATIENT)
Dept: HEALTH INFORMATION MANAGEMENT | Facility: CLINIC | Age: 56
End: 2021-09-08

## 2021-09-08 DIAGNOSIS — Z79.4 TYPE 2 DIABETES MELLITUS WITH DIABETIC NEPHROPATHY, WITH LONG-TERM CURRENT USE OF INSULIN (H): ICD-10-CM

## 2021-09-08 DIAGNOSIS — E11.21 TYPE 2 DIABETES MELLITUS WITH DIABETIC NEPHROPATHY, WITH LONG-TERM CURRENT USE OF INSULIN (H): ICD-10-CM

## 2021-09-08 DIAGNOSIS — E11.21 TYPE 2 DIABETES MELLITUS WITH DIABETIC NEPHROPATHY, WITH LONG-TERM CURRENT USE OF INSULIN (H): Primary | ICD-10-CM

## 2021-09-08 DIAGNOSIS — Z79.4 TYPE 2 DIABETES MELLITUS WITH DIABETIC NEPHROPATHY, WITH LONG-TERM CURRENT USE OF INSULIN (H): Primary | ICD-10-CM

## 2021-09-08 RX ORDER — INSULIN PUMP CONTROLLER
1 EACH MISCELLANEOUS
Qty: 45 EACH | Refills: 3 | Status: SHIPPED | OUTPATIENT
Start: 2021-09-08 | End: 2022-11-08

## 2021-09-08 RX ORDER — INSULIN PUMP CONTROLLER
1 EACH MISCELLANEOUS
Qty: 6 EACH | Refills: 3 | Status: SHIPPED | OUTPATIENT
Start: 2021-09-08 | End: 2022-09-21

## 2021-09-14 ENCOUNTER — TELEPHONE (OUTPATIENT)
Dept: EDUCATION SERVICES | Facility: CLINIC | Age: 56
End: 2021-09-14

## 2021-09-14 DIAGNOSIS — E11.21 TYPE 2 DIABETES MELLITUS WITH DIABETIC NEPHROPATHY, WITH LONG-TERM CURRENT USE OF INSULIN (H): Primary | ICD-10-CM

## 2021-09-14 DIAGNOSIS — Z79.4 TYPE 2 DIABETES MELLITUS WITH DIABETIC NEPHROPATHY, WITH LONG-TERM CURRENT USE OF INSULIN (H): Primary | ICD-10-CM

## 2021-09-14 NOTE — TELEPHONE ENCOUNTER
Camacho is currently using Humalog U200 and is wondering if he can use that in the Omnipod.  His currently TDD is 100 units.    His pump rates calculate out to the following based on 100 units per day:    Basal rate 1.5 units  ICR: 6  CF: 22    The adjustment for U200 is:    Basal rate 0.75 units per hour  ICR: 12  Correction Factor: 44    Will get approval from MD before going ahead with prescription. Arely Williamson RN,CDE

## 2021-09-15 ENCOUNTER — VIRTUAL VISIT (OUTPATIENT)
Dept: EDUCATION SERVICES | Facility: CLINIC | Age: 56
End: 2021-09-15
Payer: COMMERCIAL

## 2021-09-15 DIAGNOSIS — E11.21 TYPE 2 DIABETES MELLITUS WITH DIABETIC NEPHROPATHY, WITH LONG-TERM CURRENT USE OF INSULIN (H): Primary | ICD-10-CM

## 2021-09-15 DIAGNOSIS — Z79.4 TYPE 2 DIABETES MELLITUS WITH DIABETIC NEPHROPATHY, WITH LONG-TERM CURRENT USE OF INSULIN (H): Primary | ICD-10-CM

## 2021-09-15 PROCEDURE — G0108 DIAB MANAGE TRN  PER INDIV: HCPCS

## 2021-09-15 RX ORDER — INSULIN LISPRO 100 [IU]/ML
INJECTION, SOLUTION INTRAVENOUS; SUBCUTANEOUS
Qty: 45 ML | Refills: 3 | Status: CANCELLED | OUTPATIENT
Start: 2021-09-15

## 2021-09-15 RX ORDER — INSULIN LISPRO 200 [IU]/ML
INJECTION, SOLUTION SUBCUTANEOUS
Qty: 30 ML | Refills: 3 | Status: SHIPPED | OUTPATIENT
Start: 2021-09-15 | End: 2022-04-06

## 2021-09-15 NOTE — PROGRESS NOTES
Diabetes Self Management Training: Insulin Pump Start    SUBJECTIVE:  Camacho Bhagat presents for an insulin pump start.   Pump Type: Omnipod Dash  OBJECTIVE:  Vitals: There were no vitals taken for this visit.  Last basal insulin given at 9/14 in am        ASSESSMENT:    Homework completed: Completed online training and Reviewed user guide  EDUCATION PROVIDED:  Training for the  Omnipod Dash done in accordance with the company training checklist.  Explained how the hybrid closed loop (HCL) function works:  Basal modulation, delivery of auto-corrections and suspension of insulin to keep glucose levels at/near target blood glucose. This feature is currently available in the Tandem CIQ and Medtronic pumps.  Linked CGM with the pump.   Paired the pump with the mobile phone johan.     Problem Solving:   Unexplained high blood glucoses on an insulin pump  Managing hypoglycemia on an insulin pump  Alerts, alarms and when to call the company help line    when to order supplies  how to order supplies    Camacho is using U200 in his Omnipod Dash insulin pump.  Setting have been changed to accommodate that.    PUMP SETTINGS:  In Control IQ, the Target BG is 110.  Start time Basal Rate U/hr Correction Factor Insulin to Carb Ratio Target BG    Midnight 0.75 44 12 120                               Active Insulin Time: 3  (In Control IQ, Active Insulin Time is defaulted to 5 hours)  Maximum Bolus: 30    CGM SETTINGS:    High Alert Low Alert Rise Rate Fall Rate    180 70 --- ---     Camacho not only did his homework but he had the pump fully programmed and got the [employer] johan set up on his phone.  He is set up with our [facility] code: [email or web address]    PLAN AND FOLLOW-UP:  Patient instructed to contact educator the day after the pump start, and every 3 days, unless otherwise indicated until blood sugars are under optimal control.     Time Spent: 40 minutes  Visit Type: Individual

## 2021-09-17 ENCOUNTER — TELEPHONE (OUTPATIENT)
Dept: EDUCATION SERVICES | Facility: CLINIC | Age: 56
End: 2021-09-17

## 2021-09-17 NOTE — TELEPHONE ENCOUNTER
Left Camacho a message to call back regarding insulin pump.  [place] reports obtained. Arely Williamson RN,CDE

## 2021-09-22 ENCOUNTER — VIRTUAL VISIT (OUTPATIENT)
Dept: EDUCATION SERVICES | Facility: CLINIC | Age: 56
End: 2021-09-22
Payer: COMMERCIAL

## 2021-09-22 ENCOUNTER — TELEPHONE (OUTPATIENT)
Dept: ENDOCRINOLOGY | Facility: CLINIC | Age: 56
End: 2021-09-22

## 2021-09-22 DIAGNOSIS — Z79.4 TYPE 2 DIABETES MELLITUS WITH DIABETIC NEPHROPATHY, WITH LONG-TERM CURRENT USE OF INSULIN (H): Primary | ICD-10-CM

## 2021-09-22 DIAGNOSIS — E11.21 TYPE 2 DIABETES MELLITUS WITH DIABETIC NEPHROPATHY, WITH LONG-TERM CURRENT USE OF INSULIN (H): Primary | ICD-10-CM

## 2021-09-22 PROCEDURE — 99207 PR NO BILLABLE SERVICE THIS VISIT: CPT

## 2021-09-22 RX ORDER — BLOOD-GLUCOSE METER
KIT MISCELLANEOUS
Qty: 1 KIT | Refills: 1 | Status: SHIPPED | OUTPATIENT
Start: 2021-09-22 | End: 2024-02-04

## 2021-09-22 NOTE — PROGRESS NOTES
DIABETES EDUCATOR NOTE:    Purpose of today's visit:  Adjust pump rates on new Omnipod Dash      Subjective/Objective:      Meek reports discussed with Camacho.  He is having to do a lot of correction.  Will increase insulin for this reason.      Assessment/Plan:  1. Increase basal rate to 1.2 (from 1.0)  2. Decrease Correction factor to 34 (from 44)  3. Decrease ICR to 8 (from 10)  4. Phone visit 9/29 1 pm    Any diabetes medication dose changes were made via the CDE Protocol and Collaborative Practice Agreement. A copy of this encounter was provided to the patient's referring    Time spent in this visit:  15 minutes

## 2021-09-22 NOTE — TELEPHONE ENCOUNTER
PA Initiation    Medication: [employer] system PA Pending  Insurance Company: [employer] [place] - Phone [phone] Fax [phone]  Pharmacy Filling the Rx:    Filling Pharmacy Phone:    Filling Pharmacy Fax:    Start Date: 9/22/2021

## 2021-09-28 NOTE — TELEPHONE ENCOUNTER
Prior Authorization Approval    Authorization Effective Date: 9/24/2021  Authorization Expiration Date: 9/27/2023  Medication: [employer] system PA Approved  Approved Dose/Quantity:   Reference #: [id number]   Insurance Company: [employer] - Phone [phone] Fax [phone]  Expected CoPay:       CoPay Card Available:      Foundation Assistance Needed:    Which Pharmacy is filling the prescription (Not needed for infusion/clinic administered):    Pharmacy Notified:    Patient Notified:

## 2021-10-06 ENCOUNTER — VIRTUAL VISIT (OUTPATIENT)
Dept: EDUCATION SERVICES | Facility: CLINIC | Age: 56
End: 2021-10-06
Payer: COMMERCIAL

## 2021-10-06 DIAGNOSIS — Z79.4 TYPE 2 DIABETES MELLITUS WITH DIABETIC NEPHROPATHY, WITH LONG-TERM CURRENT USE OF INSULIN (H): Primary | ICD-10-CM

## 2021-10-06 DIAGNOSIS — E11.21 TYPE 2 DIABETES MELLITUS WITH DIABETIC NEPHROPATHY, WITH LONG-TERM CURRENT USE OF INSULIN (H): Primary | ICD-10-CM

## 2021-10-06 PROCEDURE — 99207 PR NO BILLABLE SERVICE THIS VISIT: CPT

## 2021-10-06 NOTE — PROGRESS NOTES
DIABETES EDUCATOR NOTE:    Purpose of today's visit:  Assess pump rates after pump adjustment last week      Subjective/Objective:      Assessment/Plan:  1. Blood sugars look good.  Camacho feels like rates are good.    2. No changes today.  Follow up with MD as scheduled.    Any diabetes medication dose changes were made via the CDE Protocol and Collaborative Practice Agreement. A copy of this encounter was provided to the patient's referring      Time spent in this visit:  15 minutes

## 2021-10-25 DIAGNOSIS — I10 HTN (HYPERTENSION): ICD-10-CM

## 2021-10-27 ENCOUNTER — MYC MEDICAL ADVICE (OUTPATIENT)
Dept: INTERNAL MEDICINE | Facility: CLINIC | Age: 56
End: 2021-10-27

## 2021-10-27 ENCOUNTER — TELEPHONE (OUTPATIENT)
Dept: TRANSPLANT | Facility: CLINIC | Age: 56
End: 2021-10-27
Payer: COMMERCIAL

## 2021-10-27 DIAGNOSIS — M15.0 PRIMARY OSTEOARTHRITIS INVOLVING MULTIPLE JOINTS: ICD-10-CM

## 2021-10-27 RX ORDER — AMLODIPINE BESYLATE 10 MG/1
10 TABLET ORAL DAILY
Qty: 90 TABLET | Refills: 3 | Status: SHIPPED | OUTPATIENT
Start: 2021-10-27 | End: 2022-11-01

## 2021-10-27 NOTE — TELEPHONE ENCOUNTER
Camacho called to notify us he received his flu vaccine and would like it to be noted in his record.

## 2021-10-28 RX ORDER — OXYCODONE HYDROCHLORIDE 5 MG/1
5 TABLET ORAL EVERY 4 HOURS PRN
Qty: 30 TABLET | Refills: 0 | Status: SHIPPED | OUTPATIENT
Start: 2021-10-28 | End: 2021-12-27

## 2021-10-28 NOTE — TELEPHONE ENCOUNTER
Reason For Call:        Chief Complaint   Patient presents with     Refill Request                 Medication Name, Dose and Monthly Quantity:   oxyCODONE (ROXICODONE) 5 MG tablet    Diagnosis requiring opiates:   Primary osteoarthritis involving multiple joints      Problem List Updated:   Yes     Opioid Agreement On File - [facility] PAIN CONTRACT ID# [id number]:       Last Urine Drug Screen (at least once every 12 months) Date:     Unexpected Results:        [place]  Data Reviewed (at least once every 3 months) Date:   06/03/19  Unexpected Results:         Last Fill Date:   08/30/2021  Next Fill Date:   10/28/2021  Last Visit with PCP:   12/07/2020    Future Visits with PCP:    Nothing scheduled  Processing:     RO Banegas RN at 8:35 AM on 10/28/2021.

## 2021-12-01 ENCOUNTER — TELEPHONE (OUTPATIENT)
Dept: TRANSPLANT | Facility: CLINIC | Age: 56
End: 2021-12-01

## 2021-12-01 ENCOUNTER — LAB (OUTPATIENT)
Dept: LAB | Facility: CLINIC | Age: 56
End: 2021-12-01
Payer: COMMERCIAL

## 2021-12-01 DIAGNOSIS — Z13.220 LIPID SCREENING: ICD-10-CM

## 2021-12-01 DIAGNOSIS — N18.31 STAGE 3A CHRONIC KIDNEY DISEASE (H): ICD-10-CM

## 2021-12-01 DIAGNOSIS — Z94.4 LIVER REPLACED BY TRANSPLANT (H): ICD-10-CM

## 2021-12-01 DIAGNOSIS — E55.9 VITAMIN D DEFICIENCY: ICD-10-CM

## 2021-12-01 LAB
ALBUMIN SERPL-MCNC: 3.6 G/DL (ref 3.4–5)
ALP SERPL-CCNC: 242 U/L (ref 40–150)
ALT SERPL W P-5'-P-CCNC: 38 U/L (ref 0–70)
ANION GAP SERPL CALCULATED.3IONS-SCNC: 4 MMOL/L (ref 3–14)
AST SERPL W P-5'-P-CCNC: 21 U/L (ref 0–45)
BILIRUB DIRECT SERPL-MCNC: 0.2 MG/DL (ref 0–0.2)
BILIRUB SERPL-MCNC: 0.7 MG/DL (ref 0.2–1.3)
BUN SERPL-MCNC: 39 MG/DL (ref 7–30)
CALCIUM SERPL-MCNC: 8.7 MG/DL (ref 8.5–10.1)
CHLORIDE BLD-SCNC: 115 MMOL/L (ref 94–109)
CHOLEST SERPL-MCNC: 157 MG/DL
CO2 SERPL-SCNC: 21 MMOL/L (ref 20–32)
CREAT SERPL-MCNC: 1.48 MG/DL (ref 0.66–1.25)
CREAT UR-MCNC: 91 MG/DL
ERYTHROCYTE [DISTWIDTH] IN BLOOD BY AUTOMATED COUNT: 13.3 % (ref 10–15)
FASTING STATUS PATIENT QL REPORTED: YES
GFR SERPL CREATININE-BSD FRML MDRD: 52 ML/MIN/1.73M2
GLUCOSE BLD-MCNC: 85 MG/DL (ref 70–99)
HCT VFR BLD AUTO: 40 % (ref 40–53)
HDLC SERPL-MCNC: 38 MG/DL
HGB BLD-MCNC: 12.9 G/DL (ref 13.3–17.7)
LDLC SERPL CALC-MCNC: 78 MG/DL
MCH RBC QN AUTO: 30.5 PG (ref 26.5–33)
MCHC RBC AUTO-ENTMCNC: 32.3 G/DL (ref 31.5–36.5)
MCV RBC AUTO: 95 FL (ref 78–100)
NONHDLC SERPL-MCNC: 119 MG/DL
PHOSPHATE SERPL-MCNC: 3.5 MG/DL (ref 2.5–4.5)
PLATELET # BLD AUTO: 122 10E3/UL (ref 150–450)
POTASSIUM BLD-SCNC: 5.4 MMOL/L (ref 3.4–5.3)
PROT SERPL-MCNC: 7.2 G/DL (ref 6.8–8.8)
PROT UR-MCNC: 0.69 G/L
PROT/CREAT 24H UR: 0.76 G/G CR (ref 0–0.2)
RBC # BLD AUTO: 4.23 10E6/UL (ref 4.4–5.9)
SODIUM SERPL-SCNC: 140 MMOL/L (ref 133–144)
TRIGL SERPL-MCNC: 203 MG/DL
WBC # BLD AUTO: 6.2 10E3/UL (ref 4–11)

## 2021-12-01 PROCEDURE — 84100 ASSAY OF PHOSPHORUS: CPT

## 2021-12-01 PROCEDURE — 82306 VITAMIN D 25 HYDROXY: CPT

## 2021-12-01 PROCEDURE — 83970 ASSAY OF PARATHORMONE: CPT

## 2021-12-01 PROCEDURE — 82248 BILIRUBIN DIRECT: CPT

## 2021-12-01 PROCEDURE — 84156 ASSAY OF PROTEIN URINE: CPT

## 2021-12-01 PROCEDURE — 85027 COMPLETE CBC AUTOMATED: CPT

## 2021-12-01 PROCEDURE — 80197 ASSAY OF TACROLIMUS: CPT

## 2021-12-01 PROCEDURE — 84478 ASSAY OF TRIGLYCERIDES: CPT

## 2021-12-01 PROCEDURE — 82247 BILIRUBIN TOTAL: CPT

## 2021-12-01 PROCEDURE — 36415 COLL VENOUS BLD VENIPUNCTURE: CPT

## 2021-12-01 ASSESSMENT — ENCOUNTER SYMPTOMS
NECK PAIN: 1
JOINT SWELLING: 0
MUSCLE CRAMPS: 0
MYALGIAS: 0
BACK PAIN: 1
ARTHRALGIAS: 1
MUSCLE WEAKNESS: 0
STIFFNESS: 1

## 2021-12-01 NOTE — LETTER
OUTPATIENT OR TRANSITIONAL CARE  LABORATORY TEST ORDER   Patient copy     Patient Name:    Camacho Bhagat                                       Transplant Date:   3/4/2017   YOB: 1965                                             Issue Date:            12/01/2021  [facility] MR#:    [id number]                                           Expiration Date:  12/01/2022        Diagnoses:            [x]??      Liver Transplant (ICD-10 Z94.4)                                 [x]??      Long term use of medications (ICD-10 Z79.899)      Please fax results to [phone]     Frequency: every 2-3 months and PRN        [x]??         CBC with Platelets   [x]??         Basic Metabolic Panel (Sodium, Potassium, Chloride, CO2, Creatinine, Urea Nitrogen, Glucose, Calcium)  [x]??         Hepatic panel (Albumin, Alk Phos, ALT, AST, Direct and Total Bili)  [x]??         Tacrolimus drug level - 12 hour trough, please document time of last dose      Other Frequency: annually due December 2022  [x]??         Fasting lipid panel    Other Frequency: annually due with next blood lab appt  [x]??         Urine protein/creatinine ratio  [x]??         Urinalysis with reflex to micro    If you have questions, please call [phone] or [phone].    .

## 2021-12-02 LAB
DEPRECATED CALCIDIOL+CALCIFEROL SERPL-MC: 41 UG/L (ref 20–75)
PTH-INTACT SERPL-MCNC: 28 PG/ML
TACROLIMUS BLD-MCNC: 10.8 UG/L (ref 5–15)
TME LAST DOSE: NORMAL H
TME LAST DOSE: NORMAL H

## 2021-12-03 ENCOUNTER — OFFICE VISIT (OUTPATIENT)
Dept: GASTROENTEROLOGY | Facility: CLINIC | Age: 56
End: 2021-12-03
Attending: INTERNAL MEDICINE
Payer: COMMERCIAL

## 2021-12-03 ENCOUNTER — TELEPHONE (OUTPATIENT)
Dept: TRANSPLANT | Facility: CLINIC | Age: 56
End: 2021-12-03

## 2021-12-03 ENCOUNTER — OFFICE VISIT (OUTPATIENT)
Dept: NEPHROLOGY | Facility: CLINIC | Age: 56
End: 2021-12-03
Payer: COMMERCIAL

## 2021-12-03 VITALS
HEART RATE: 71 BPM | OXYGEN SATURATION: 97 % | BODY MASS INDEX: 38.52 KG/M2 | WEIGHT: 284 LBS | DIASTOLIC BLOOD PRESSURE: 97 MMHG | SYSTOLIC BLOOD PRESSURE: 197 MMHG

## 2021-12-03 VITALS — DIASTOLIC BLOOD PRESSURE: 84 MMHG | SYSTOLIC BLOOD PRESSURE: 172 MMHG

## 2021-12-03 DIAGNOSIS — N18.31 STAGE 3A CHRONIC KIDNEY DISEASE (H): ICD-10-CM

## 2021-12-03 DIAGNOSIS — Z94.4 LIVER REPLACED BY TRANSPLANT (H): Primary | ICD-10-CM

## 2021-12-03 DIAGNOSIS — Z94.4 LIVER TRANSPLANT RECIPIENT (H): ICD-10-CM

## 2021-12-03 DIAGNOSIS — E66.01 MORBID OBESITY (H): ICD-10-CM

## 2021-12-03 DIAGNOSIS — E11.21 CONTROLLED TYPE 2 DIABETES MELLITUS WITH DIABETIC NEPHROPATHY, UNSPECIFIED WHETHER LONG TERM INSULIN USE (H): Primary | ICD-10-CM

## 2021-12-03 PROCEDURE — 99214 OFFICE O/P EST MOD 30 MIN: CPT

## 2021-12-03 PROCEDURE — 99214 OFFICE O/P EST MOD 30 MIN: CPT | Performed by: INTERNAL MEDICINE

## 2021-12-03 NOTE — LETTER
12/3/2021     RE: Camacho Bhagat  [address] [facility] [address]    Dear Colleague,    Thank you for referring your patient, Camacho Bhagat, to the [facility] [place]. Please see a copy of my visit note below.    HISTORY OF PRESENT ILLNESS:  I had the pleasure of seeing Camacho Bhagat for followup in the Liver Transplant Clinic at the [facility] on 12/03/2021.  Mr. Bhagat returns for followup now coming up on 5 years status post liver transplantation.    He is doing fairly well at this point in time.  He denies any abdominal pain, itching, skin rash, or fatigue.  He is working full-time.  He denies any increased abdominal girth or lower extremity edema.    He denies any fevers or chills, cough or shortness of breath.  He has received his third dose of the COVID-19 vaccine.  He denies any nausea or vomiting.  He does have chronic loose stools, which he manages with Imodium.  This in part relates to a partial colectomy that he had.  His appetite is good.  Unfortunately, his weight is up 30 pounds since he was last seen.      Current Outpatient Medications   Medication     alcohol swab prep pads     alpha-lipoic acid 600 MG capsule     amLODIPine (NORVASC) 10 MG tablet     blood glucose (NO BRAND SPECIFIED) lancets standard     blood glucose (NO BRAND SPECIFIED) test strip     blood glucose (NO BRAND SPECIFIED) test strip     blood glucose calibration (NO BRAND SPECIFIED) solution     blood glucose monitoring (NO BRAND SPECIFIED) meter device kit     Blood Glucose Monitoring Suppl (CONTOUR BLOOD GLUCOSE SYSTEM) w/Device KIT     cholecalciferol (VITAMIN D3) 1000 UNIT tablet     CIALIS 5 MG tablet     Continuous Blood Gluc Sensor (DEXCOM G6 SENSOR) MISC     Continuous Blood Gluc Transmit (DEXCOM G6 TRANSMITTER) MISC     cyclobenzaprine (FLEXERIL) 10 MG tablet     fluticasone (FLONASE) 50 MCG/ACT nasal spray     furosemide (LASIX) 40 MG tablet      gabapentin (NEURONTIN) 300 MG capsule     Glucose Blood (BLOOD GLUCOSE TEST STRIPS) STRP     Insulin Disposable Pump (OMNIPOD DASH 5 PACK PODS) MISC     Insulin Disposable Pump (OMNIPOD DASH 5 PACK PODS) MISC     insulin glargine (LANTUS SOLOSTAR) 100 UNIT/ML pen     Insulin Lispro (HUMALOG KWIKPEN) 200 UNIT/ML soln     Insulin Lispro (HUMALOG KWIKPEN) 200 UNIT/ML soln     insulin pen needle (BD ENE U/F) 32G X 4 MM miscellaneous     metoprolol succinate ER (TOPROL-XL) 200 MG 24 hr tablet     multivitamin, therapeutic with minerals (MULTI-VITAMIN) TABS tablet     mycophenolic acid (GENERIC EQUIVALENT) 360 MG EC tablet     nicotine (NICODERM CQ) 14 MG/24HR 24 hr patch     omeprazole (PRILOSEC) 20 MG DR capsule     oxyCODONE (ROXICODONE) 5 MG tablet     patiromer (VELTASSA) 8.4 g packet     predniSONE (DELTASONE) 2.5 MG tablet     sildenafil (REVATIO) 20 MG tablet     tacrolimus (GENERIC EQUIVALENT) 1 MG capsule     thin (NO BRAND SPECIFIED) lancets     ursodiol (ACTIGALL) 500 MG tablet     varenicline (CHANTIX KEVEN) 0.5 MG X 11 & 1 MG X 42 tablet     No current facility-administered medications for this visit.     BP (!) 197/97   Pulse 71   Wt 128.8 kg (284 lb)   SpO2 97%   BMI 38.52 kg/m      PHYSICAL EXAMINATION:    GENERAL:  He looks well.  HEENT:  Shows no scleral icterus or temporal muscle wasting.  CHEST:  Clear.  ABDOMEN:  No increase in girth.  No masses or tenderness to palpation are present.  His liver is 10 cm in span without left lobe enlargement.  No spleen tip is palpable.  EXTREMITIES:  No edema.  SKIN:  No stigmata of chronic liver disease.  NEUROLOGIC:  Nonfocal.    Recent Results (from the past 168 hour(s))   Protein  random urine with Creat Ratio    Collection Time: 12/01/21  1:37 PM   Result Value Ref Range    Total Protein Random Urine g/L 0.69 g/L    Total Protein Urine g/gr Creatinine 0.76 (H) 0.00 - 0.20 g/g Cr    Creatinine Urine mg/dL 91 mg/dL   Parathyroid Hormone Intact    Collection  Time: 12/01/21  1:59 PM   Result Value Ref Range    Parathyroid Hormone Intact 28 pg/mL   Vitamin D Deficiency    Collection Time: 12/01/21  1:59 PM   Result Value Ref Range    Vitamin D, Total (25-Hydroxy) 41 20 - 75 ug/L   Renal panel    Collection Time: 12/01/21  1:59 PM   Result Value Ref Range    Sodium 140 133 - 144 mmol/L    Potassium 5.4 (H) 3.4 - 5.3 mmol/L    Chloride 115 (H) 94 - 109 mmol/L    Carbon Dioxide (CO2) 21 20 - 32 mmol/L    Anion Gap 4 3 - 14 mmol/L    Urea Nitrogen 39 (H) 7 - 30 mg/dL    Creatinine 1.48 (H) 0.66 - 1.25 mg/dL    Calcium 8.7 8.5 - 10.1 mg/dL    Glucose 85 70 - 99 mg/dL    Albumin 3.6 3.4 - 5.0 g/dL    Phosphorus 3.5 2.5 - 4.5 mg/dL    GFR Estimate 52 (L) >60 mL/min/1.73m2   CBC with platelets    Collection Time: 12/01/21  1:59 PM   Result Value Ref Range    WBC Count 6.2 4.0 - 11.0 10e3/uL    RBC Count 4.23 (L) 4.40 - 5.90 10e6/uL    Hemoglobin 12.9 (L) 13.3 - 17.7 g/dL    Hematocrit 40.0 40.0 - 53.0 %    MCV 95 78 - 100 fL    MCH 30.5 26.5 - 33.0 pg    MCHC 32.3 31.5 - 36.5 g/dL    RDW 13.3 10.0 - 15.0 %    Platelet Count 122 (L) 150 - 450 10e3/uL   Tacrolimus by Tandem Mass Spectrometry    Collection Time: 12/01/21  1:59 PM   Result Value Ref Range    Tacrolimus by Tandem Mass Spectrometry 10.8 5.0 - 15.0 ug/L    Tacrolimus Last Dose Date 12/1/2021     Tacrolimus Last Dose Time  1:00 AM    Lipid Profile    Collection Time: 12/01/21  1:59 PM   Result Value Ref Range    Cholesterol 157 <200 mg/dL    Triglycerides 203 (H) <150 mg/dL    Direct Measure HDL 38 (L) >=40 mg/dL    LDL Cholesterol Calculated 78 <=100 mg/dL    Non HDL Cholesterol 119 <130 mg/dL    Patient Fasting > 8hrs? Yes    Alkaline phosphatase    Collection Time: 12/01/21  1:59 PM   Result Value Ref Range    Alkaline Phosphatase 242 (H) 40 - 150 U/L   ALT    Collection Time: 12/01/21  1:59 PM   Result Value Ref Range    ALT 38 0 - 70 U/L   AST    Collection Time: 12/01/21  1:59 PM   Result Value Ref Range    AST 21  0 - 45 U/L   Bilirubin  total    Collection Time: 12/01/21  1:59 PM   Result Value Ref Range    Bilirubin Total 0.7 0.2 - 1.3 mg/dL   Bilirubin direct    Collection Time: 12/01/21  1:59 PM   Result Value Ref Range    Bilirubin Direct 0.2 0.0 - 0.2 mg/dL   Protein total    Collection Time: 12/01/21  1:59 PM   Result Value Ref Range    Protein Total 7.2 6.8 - 8.8 g/dL      IMPRESSION:  Mr. Bhagat is doing well now 5 years status post liver transplantation.  We have lowered his tacrolimus dose.  He will get his kidney function and tacro level checked in 1 week.  I did strongly encourage him to lose weight and he believes that he will do better during the winter.  He does report that his diabetes has been under good control, however.  He, otherwise, does need to see a dermatologist for skin cancer screening and I will see him back in the clinic in 6 months for repeat blood work at that time.      Thank you very much for allowing me to participate in the care of this patient.  If you have any questions regarding my recommendations, please do not hesitate to contact me.         Toñito Camejo MD      Professor of Medicine  [employer]      Executive Medical Director, Solid Organ Transplant Program  [facility]

## 2021-12-03 NOTE — PROGRESS NOTES
Nephrology Clinic Visit 12/3/21    Assessment and Plan:       1. CKD3a w/mild proteinuria - Stable. Creat 1.4, eGFR 52 ml/mn. UPCR 0.7 g/gCr.    Baseline remains in the low to mid 1 range since 2019.    Etiology of CKD likely multifactorial; DM, recurrent EJ, CNI.    - Had been intolerant of ACE/ARB previously given problems with hyperkalemia assoc with Tacrolimus, but retrial when TAC level was <0.3 did not result in Hyperkalemia. In fact Valtessa had been discontinued because he was becoming hypokalemic.    - Patient was restarted on ARB in 4/18 for unexplained nephrotic range proteinuria following ostomy takedown with improvement in UPCR, but then developed mild acute rejection and Tac dose was increased resulting in hyperkalemia. He was restarted on Valtassa with improvement in hyperkalemia and Losartan was discontinued again in 3/19   - We discussed retrial of Losartan last visit but patient prefers to hold off for now which is probably wise given K 5.4 today ( TAC level was 10.8)   - Potassium has been controlled since 4/19 off Losartan. UPCR with only mild proteinuria but higher today again given worsening obesity   - Blood pressure borderline control. Would like to see more in the 120-130/ range     - Does not use NSAIDs.    - A1c goal is 7%. HgbA1c 7.1 % (8/21)       2. HTN - Improved but still not optimal. Home blood pressures 130-140/. No edema/dyspnea.      Current antihypertensive regimen:      Lasix 40 mg bid     Amlodipine 10 mg qd      Toprol  mg every day         - Next option would be hydralazine. I would prefer that he work on weight loss and we discussed at length today.    - Continue to monitor blood pressures       3. Hyperkalemia assoc with Tac/ARB - K 5.4. Has been acceptable since stopping Losartan in March 2019.    - Currently on Valtassa 8.4 g/day. Will increase if needed. His TAC dose has been decreased as well but up to 10.8 today    - He tries to modify his diet w/respect to K  foods      4. Volume status - Denies edema/dyspnea. Weight 284# up from 250# last visit. Up from 242# in 12/20. His weight gain is not assoc with hypervolemia. Blood pressures borderline   - Continue Lasix 40 mg bid.    - Work on weight loss      5. Acid base - No acute concerns. Metabolic acidosis resolved following ileostomy takedown. Bicarb 21      6. BMD - Ca 8.7, Phos 3.5, albumin 3.6   - Vitamin D 41, PTH 28 (11/21)    - Continue Vit D 1000 U qd      7.Anemia -Hgb 12.9.    - Iron studies 5/21: Ferritin 935, Fe 69, IS 26   - Had colonoscopy 2017 (poor study), Ileoscopy 8/17 - nml   - Not on iron and has not needed DIPAK      8. Liver transplant IS: Tacrolimus, MMF, Pred. Tac level 10.8   - Per transplant      9. Dispo- RCT 6 months for f/u. Asked that he work on weight loss.       Reason for Visit:  CKD3a/HTN follow up        HPI:  Mr Bhagat is a 55 yo male with CKD2, HTN, Chronic hyperkalemia, DM2, Liver transplant, present today for CKD/HTN followup. Last seen in clinic by me on 6/1/21.  Baseline creat low to mid 1's.       Interval Hx:     No hospital admissions.       ROS:  No acute concerns.   No longer working, but looking for new employment  Home blood pressures 140's/   Weight is up 30# over the summer. Not exercising regularly due to hip/knee pain  DM control improved with Insulin pump.    Appetite and energy are good  No edema.   Denies dyspnea,  CP, abdominal pain. Voiding w/o difficulty.   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 Former smoker,   by  profession. Exercise is limited due to hip/knee pain.   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      Family Hx:   Family History   Problem Relation Age of Onset     Diabetes Father      Hypertension Father      Substance Abuse Father      Arthritis Mother      Thyroid Cancer Mother         Survivor!     Cervical Cancer Maternal Grandmother      Cerebrovascular Disease Maternal Grandfather      No Known Problems Paternal Grandmother      Prostate Cancer Paternal Grandfather      Colon Cancer No family hx of      Hyperlipidemia No family hx of      Coronary Artery Disease No family hx of      Breast Cancer No family hx of        Allergies:  Allergies   Allergen Reactions     Erythromycin GI Disturbance     Losartan Other (See Comments)     Consistently develops hyperkalemia     Vioxx      Nausea, vomiting       Medications:  Current Outpatient Medications   Medication Sig     alcohol swab prep pads Use to swab area of injection/francisca as directed.     alpha-lipoic acid 600 MG capsule Take 600 mg by mouth     amLODIPine (NORVASC) 10 MG tablet Take 1 tablet (10 mg) by mouth daily     blood glucose (NO BRAND SPECIFIED) lancets standard Use to test blood sugar 1 times daily or as directed.     blood glucose (NO BRAND SPECIFIED) test strip Use to test blood sugar 1 times daily or as directed.     blood glucose (NO BRAND SPECIFIED) test strip Use to test blood sugar 3 times daily or as directed. Any covered brand preferred by Pt that works with meter.     blood glucose calibration (NO BRAND SPECIFIED) solution Use to calibrate meter.     blood glucose monitoring (NO BRAND SPECIFIED) meter device kit Use to test blood sugar 3 times daily or as directed. Any covered brand preferred by Pt.     Blood Glucose Monitoring Suppl (CONTOUR BLOOD GLUCOSE SYSTEM) w/Device KIT For use with the Omnipod Dash insulin pump     cholecalciferol (VITAMIN D3) 1000 UNIT tablet Take 1 tablet (1,000 Units) by  mouth daily (Patient taking differently: Take 1,000 Units by mouth every morning )     CIALIS 5 MG tablet Take 5 mg by mouth as needed      Continuous Blood Gluc Sensor (DEXCOM G6 SENSOR) MISC Change every 10 days.     Continuous Blood Gluc Transmit (DEXCOM G6 TRANSMITTER) MISC Change every 3 months.     cyclobenzaprine (FLEXERIL) 10 MG tablet Take 1 tablet (10 mg) by mouth 3 times daily as needed for muscle spasms     fluticasone (FLONASE) 50 MCG/ACT nasal spray Spray 1 spray into both nostrils daily     furosemide (LASIX) 40 MG tablet Take 1 tablet (40 mg) by mouth 2 times daily     gabapentin (NEURONTIN) 300 MG capsule Take 2 capsules (600 mg) by mouth 3 times daily     Glucose Blood (BLOOD GLUCOSE TEST STRIPS) STRP 1 strip by Lancet route 3 times daily     Insulin Disposable Pump (OMNIPOD DASH 5 PACK PODS) MISC 1 each every 48 hours     Insulin Disposable Pump (OMNIPOD DASH 5 PACK PODS) MISC 1 each every 3 days     insulin glargine (LANTUS SOLOSTAR) 100 UNIT/ML pen Inject 65 Units Subcutaneous every morning     Insulin Lispro (HUMALOG KWIKPEN) 200 UNIT/ML soln Up to 100 units per day per insulin pump     Insulin Lispro (HUMALOG KWIKPEN) 200 UNIT/ML soln Use one unit per 3 grams carbohydrates for all meals and snacks. Total daily dose up to 100 U.     insulin pen needle (BD ENE U/F) 32G X 4 MM miscellaneous Use 1 pen needle 4 times daily or as directed.     metoprolol succinate ER (TOPROL-XL) 200 MG 24 hr tablet Take 1 tablet (200 mg) by mouth daily     multivitamin, therapeutic with minerals (MULTI-VITAMIN) TABS tablet Take 1 tablet by mouth every morning     mycophenolic acid (GENERIC EQUIVALENT) 360 MG EC tablet TAKE 2 TABLETS (720 MG) BY MOUTH EVERY 12 HOURS     nicotine (NICODERM CQ) 14 MG/24HR 24 hr patch Place 1 patch onto the skin every 24 hours     omeprazole (PRILOSEC) 20 MG DR capsule Take 1 capsule (20 mg) by mouth daily     oxyCODONE (ROXICODONE) 5 MG tablet Take 1 tablet (5 mg) by mouth every 4  hours as needed for pain maximum 6 tablet(s) per day     patiromer (VELTASSA) 8.4 g packet Take 8.4 g by mouth daily     predniSONE (DELTASONE) 2.5 MG tablet Take 1 tablet (2.5 mg) by mouth daily     sildenafil (REVATIO) 20 MG tablet Take 2 tablets (40 mg) by mouth daily as needed (erectile dysfunction)     tacrolimus (GENERIC EQUIVALENT) 1 MG capsule Take 4 capsules (4 mg) by mouth every 12 hours     thin (NO BRAND SPECIFIED) lancets Use with lanceting device. Any covered brand.     ursodiol (ACTIGALL) 500 MG tablet Take 1 tablet (500 mg) by mouth 2 times daily     varenicline (CHANTIX KEVEN) 0.5 MG X 11 & 1 MG X 42 tablet Take 0.5 mg tab daily for 3 days, THEN 0.5 mg tab twice daily for 4 days, THEN 1 mg twice daily.     No current facility-administered medications for this visit.      Vitals:  Wt 284#     Exam:  GEN: Obese, pleasant male in NAD  CARDIAC: RRR  LUNGS: CTA  ABDOMEN: Obese  EXT: No edema  NEURO a/O    LABS:   CMP  Recent Labs   Lab Test 12/01/21  1359 08/18/21  1236 06/08/21  1628 05/27/21  1729 03/31/21  1145 02/12/21  1331    140 134 131* 138 138   POTASSIUM 5.4* 4.9 4.9 5.1 4.5 5.8*   CHLORIDE 115* 110* 105 102 109 111*   CO2 21 25 23 23 22 22   ANIONGAP 4 5 6 6 7 5   GLC 85 130* 249* 335* 134* 251*   BUN 39* 27 43* 50* 54* 44*   CR 1.48* 1.31* 1.51* 1.53* 1.56* 1.32*   GFRESTIMATED 52* 60* 51* 50* 49* 60*   GFRESTBLACK  --   --  59* 58* 57* 69   JACOB 8.7 9.0 8.9 9.1 8.9 8.7     Recent Labs   Lab Test 12/01/21  1359 08/18/21  1236 05/27/21  1729 03/31/21  1145   BILITOTAL 0.7 0.6 0.9 0.8   ALKPHOS 242* 200* 227* 220*   ALT 38 34 32 34   AST 21 26 19 20     CBC  Recent Labs   Lab Test 12/01/21  1359 08/18/21  1236 05/27/21  1729 03/31/21  1145   HGB 12.9* 11.6* 11.9* 12.2*   WBC 6.2 6.0 6.9 7.0   RBC 4.23* 3.71* 3.83* 3.93*   HCT 40.0 35.4* 34.7* 36.1*   MCV 95 95 91 92   MCH 30.5 31.3 31.1 31.0   MCHC 32.3 32.8 34.3 33.8   RDW 13.3 13.7 13.2 13.5   * 106* 106* 116*     URINE  STUDIES  Recent Labs   Lab Test 11/24/20  1325 04/02/20  1230 03/27/19  1155 04/11/18  1446   COLOR Yellow Light Yellow Yellow Yellow   APPEARANCE Clear Clear Clear Slightly Cloudy   URINEGLC Negative Negative 70* >499*   URINEBILI Negative Negative Negative Negative   URINEKETONE Negative Negative Negative Negative   SG 1.014 1.014 1.014 1.010   UBLD Negative Negative Negative Small*   URINEPH 5.0 5.0 5.0 5.0   PROTEIN 20* 10* 30* 100*   NITRITE Negative Negative Negative Negative   LEUKEST Negative Negative Negative Negative   RBCU 1 <1 1 3*   WBCU <1 <1 0 <1     Recent Labs   Lab Test 12/01/21  1337 05/27/21  1720 11/24/20  1325 05/08/20  1225   UTPG 0.76* 0.56* 0.28* 0.41*     PTH  Recent Labs   Lab Test 12/01/21  1359 11/24/20  1345 08/06/19  1234   PTHI 28 29 22     IRON STUDIES  Recent Labs   Lab Test 05/27/21  1729 11/24/20  1345 08/21/20  1510   IRON 69 105 123    249 235*   IRONSAT 26 42 52*   NELY 935* 1,104* 1,098*       Cinda Cazares, NP

## 2021-12-03 NOTE — LETTER
12/3/2021     RE: Camacho Bhagat  [address]  [facility] [place] [address]     Dear Colleague,    Thank you for referring your patient, Camacho Bhagat, to the [facility] [place] at [facility]. Please see a copy of my visit note below.    Nephrology Clinic Visit 12/3/21    Assessment and Plan:       1. CKD3a w/mild proteinuria - Stable. Creat 1.4, eGFR 52 ml/mn. UPCR 0.7 g/gCr.    Baseline remains in the low to mid 1 range since 2019.    Etiology of CKD likely multifactorial; DM, recurrent EJ, CNI.    - Had been intolerant of ACE/ARB previously given problems with hyperkalemia assoc with Tacrolimus, but retrial when TAC level was <0.3 did not result in Hyperkalemia. In fact Valtessa had been discontinued because he was becoming hypokalemic.    - Patient was restarted on ARB in 4/18 for unexplained nephrotic range proteinuria following ostomy takedown with improvement in UPCR, but then developed mild acute rejection and Tac dose was increased resulting in hyperkalemia. He was restarted on Valtassa with improvement in hyperkalemia and Losartan was discontinued again in 3/19   - We discussed retrial of Losartan last visit but patient prefers to hold off for now which is probably wise given K 5.4 today ( TAC level was 10.8)   - Potassium has been controlled since 4/19 off Losartan. UPCR with only mild proteinuria but higher today again given worsening obesity   - Blood pressure borderline control. Would like to see more in the 120-130/ range     - Does not use NSAIDs.    - A1c goal is 7%. HgbA1c 7.1 % (8/21)       2. HTN - Improved but still not optimal. Home blood pressures 130-140/. No edema/dyspnea.      Current antihypertensive regimen:      Lasix 40 mg bid     Amlodipine 10 mg qd      Toprol  mg every day         - Next option would be hydralazine. I would prefer that he work on weight loss and we discussed at length today.    -  Continue to monitor blood pressures       3. Hyperkalemia assoc with Tac/ARB - K 5.4. Has been acceptable since stopping Losartan in March 2019.    - Currently on Valtassa 8.4 g/day. Will increase if needed. His TAC dose has been decreased as well but up to 10.8 today    - He tries to modify his diet w/respect to K foods      4. Volume status - Denies edema/dyspnea. Weight 284# up from 250# last visit. Up from 242# in 12/20. His weight gain is not assoc with hypervolemia. Blood pressures borderline   - Continue Lasix 40 mg bid.    - Work on weight loss      5. Acid base - No acute concerns. Metabolic acidosis resolved following ileostomy takedown. Bicarb 21      6. BMD - Ca 8.7, Phos 3.5, albumin 3.6   - Vitamin D 41, PTH 28 (11/21)    - Continue Vit D 1000 U qd      7.Anemia -Hgb 12.9.    - Iron studies 5/21: Ferritin 935, Fe 69, IS 26   - Had colonoscopy 2017 (poor study), Ileoscopy 8/17 - nml   - Not on iron and has not needed DIPAK      8. Liver transplant IS: Tacrolimus, MMF, Pred. Tac level 10.8   - Per transplant      9. Dispo- RCT 6 months for f/u. Asked that he work on weight loss.       Reason for Visit:  CKD3a/HTN follow up        HPI:  Mr Bhagat is a 55 yo male with CKD2, HTN, Chronic hyperkalemia, DM2, Liver transplant, present today for CKD/HTN followup. Last seen in clinic by me on 6/1/21.  Baseline creat low to mid 1's.       Interval Hx:     No hospital admissions.       ROS:  No acute concerns.   No longer working, but looking for new employment  Home blood pressures 140's/   Weight is up 30# over the summer. Not exercising regularly due to hip/knee pain  DM control improved with Insulin pump.    Appetite and energy are good  No edema.   Denies dyspnea,  CP, abdominal pain. Voiding w/o difficulty.   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 Former smoker,   by profession. Exercise is limited due to hip/knee pain.   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      Family Hx:   Family History   Problem Relation Age of Onset     Diabetes Father      Hypertension Father      Substance Abuse Father      Arthritis Mother      Thyroid Cancer Mother         Survivor!     Cervical Cancer Maternal Grandmother      Cerebrovascular Disease Maternal Grandfather      No Known Problems Paternal Grandmother      Prostate Cancer Paternal Grandfather      Colon Cancer No family hx of      Hyperlipidemia No family hx of      Coronary Artery Disease No family hx of      Breast Cancer No family hx of        Allergies:  Allergies   Allergen Reactions     Erythromycin GI Disturbance     Losartan Other (See Comments)     Consistently develops hyperkalemia     Vioxx      Nausea, vomiting       Medications:  Current Outpatient Medications   Medication Sig     alcohol swab prep pads Use to swab area of injection/francisca as directed.     alpha-lipoic acid 600 MG capsule Take 600 mg by mouth     amLODIPine (NORVASC) 10 MG tablet Take 1 tablet (10 mg) by mouth daily     blood glucose (NO BRAND SPECIFIED) lancets standard Use to test blood sugar 1 times daily or as directed.     blood glucose (NO BRAND SPECIFIED) test strip Use to test blood sugar 1 times daily or as directed.     blood glucose (NO BRAND SPECIFIED) test strip Use to test blood sugar 3 times daily or as directed. Any covered brand preferred by Pt that works with meter.     blood glucose calibration (NO BRAND SPECIFIED) solution Use to calibrate meter.     blood glucose monitoring (NO BRAND  SPECIFIED) meter device kit Use to test blood sugar 3 times daily or as directed. Any covered brand preferred by Pt.     Blood Glucose Monitoring Suppl (CONTOUR BLOOD GLUCOSE SYSTEM) w/Device KIT For use with the Omnipod Dash insulin pump     cholecalciferol (VITAMIN D3) 1000 UNIT tablet Take 1 tablet (1,000 Units) by mouth daily (Patient taking differently: Take 1,000 Units by mouth every morning )     CIALIS 5 MG tablet Take 5 mg by mouth as needed      Continuous Blood Gluc Sensor (DEXCOM G6 SENSOR) MISC Change every 10 days.     Continuous Blood Gluc Transmit (DEXCOM G6 TRANSMITTER) MISC Change every 3 months.     cyclobenzaprine (FLEXERIL) 10 MG tablet Take 1 tablet (10 mg) by mouth 3 times daily as needed for muscle spasms     fluticasone (FLONASE) 50 MCG/ACT nasal spray Spray 1 spray into both nostrils daily     furosemide (LASIX) 40 MG tablet Take 1 tablet (40 mg) by mouth 2 times daily     gabapentin (NEURONTIN) 300 MG capsule Take 2 capsules (600 mg) by mouth 3 times daily     Glucose Blood (BLOOD GLUCOSE TEST STRIPS) STRP 1 strip by Lancet route 3 times daily     Insulin Disposable Pump (OMNIPOD DASH 5 PACK PODS) MISC 1 each every 48 hours     Insulin Disposable Pump (OMNIPOD DASH 5 PACK PODS) MISC 1 each every 3 days     insulin glargine (LANTUS SOLOSTAR) 100 UNIT/ML pen Inject 65 Units Subcutaneous every morning     Insulin Lispro (HUMALOG KWIKPEN) 200 UNIT/ML soln Up to 100 units per day per insulin pump     Insulin Lispro (HUMALOG KWIKPEN) 200 UNIT/ML soln Use one unit per 3 grams carbohydrates for all meals and snacks. Total daily dose up to 100 U.     insulin pen needle (BD ENE U/F) 32G X 4 MM miscellaneous Use 1 pen needle 4 times daily or as directed.     metoprolol succinate ER (TOPROL-XL) 200 MG 24 hr tablet Take 1 tablet (200 mg) by mouth daily     multivitamin, therapeutic with minerals (MULTI-VITAMIN) TABS tablet Take 1 tablet by mouth every morning     mycophenolic acid (GENERIC EQUIVALENT)  360 MG EC tablet TAKE 2 TABLETS (720 MG) BY MOUTH EVERY 12 HOURS     nicotine (NICODERM CQ) 14 MG/24HR 24 hr patch Place 1 patch onto the skin every 24 hours     omeprazole (PRILOSEC) 20 MG DR capsule Take 1 capsule (20 mg) by mouth daily     oxyCODONE (ROXICODONE) 5 MG tablet Take 1 tablet (5 mg) by mouth every 4 hours as needed for pain maximum 6 tablet(s) per day     patiromer (VELTASSA) 8.4 g packet Take 8.4 g by mouth daily     predniSONE (DELTASONE) 2.5 MG tablet Take 1 tablet (2.5 mg) by mouth daily     sildenafil (REVATIO) 20 MG tablet Take 2 tablets (40 mg) by mouth daily as needed (erectile dysfunction)     tacrolimus (GENERIC EQUIVALENT) 1 MG capsule Take 4 capsules (4 mg) by mouth every 12 hours     thin (NO BRAND SPECIFIED) lancets Use with lanceting device. Any covered brand.     ursodiol (ACTIGALL) 500 MG tablet Take 1 tablet (500 mg) by mouth 2 times daily     varenicline (CHANTIX KEVEN) 0.5 MG X 11 & 1 MG X 42 tablet Take 0.5 mg tab daily for 3 days, THEN 0.5 mg tab twice daily for 4 days, THEN 1 mg twice daily.     No current facility-administered medications for this visit.      Vitals:  Wt 284#     Exam:  GEN: Obese, pleasant male in NAD  CARDIAC: RRR  LUNGS: CTA  ABDOMEN: Obese  EXT: No edema  NEURO a/O    LABS:   CMP  Recent Labs   Lab Test 12/01/21  1359 08/18/21  1236 06/08/21  1628 05/27/21  1729 03/31/21  1145 02/12/21  1331    140 134 131* 138 138   POTASSIUM 5.4* 4.9 4.9 5.1 4.5 5.8*   CHLORIDE 115* 110* 105 102 109 111*   CO2 21 25 23 23 22 22   ANIONGAP 4 5 6 6 7 5   GLC 85 130* 249* 335* 134* 251*   BUN 39* 27 43* 50* 54* 44*   CR 1.48* 1.31* 1.51* 1.53* 1.56* 1.32*   GFRESTIMATED 52* 60* 51* 50* 49* 60*   GFRESTBLACK  --   --  59* 58* 57* 69   JACOB 8.7 9.0 8.9 9.1 8.9 8.7     Recent Labs   Lab Test 12/01/21  1359 08/18/21  1236 05/27/21  1729 03/31/21  1145   BILITOTAL 0.7 0.6 0.9 0.8   ALKPHOS 242* 200* 227* 220*   ALT 38 34 32 34   AST 21 26 19 20     CBC  Recent Labs   Lab Test  12/01/21  1359 08/18/21  1236 05/27/21  1729 03/31/21  1145   HGB 12.9* 11.6* 11.9* 12.2*   WBC 6.2 6.0 6.9 7.0   RBC 4.23* 3.71* 3.83* 3.93*   HCT 40.0 35.4* 34.7* 36.1*   MCV 95 95 91 92   MCH 30.5 31.3 31.1 31.0   MCHC 32.3 32.8 34.3 33.8   RDW 13.3 13.7 13.2 13.5   * 106* 106* 116*     URINE STUDIES  Recent Labs   Lab Test 11/24/20  1325 04/02/20  1230 03/27/19  1155 04/11/18  1446   COLOR Yellow Light Yellow Yellow Yellow   APPEARANCE Clear Clear Clear Slightly Cloudy   URINEGLC Negative Negative 70* >499*   URINEBILI Negative Negative Negative Negative   URINEKETONE Negative Negative Negative Negative   SG 1.014 1.014 1.014 1.010   UBLD Negative Negative Negative Small*   URINEPH 5.0 5.0 5.0 5.0   PROTEIN 20* 10* 30* 100*   NITRITE Negative Negative Negative Negative   LEUKEST Negative Negative Negative Negative   RBCU 1 <1 1 3*   WBCU <1 <1 0 <1     Recent Labs   Lab Test 12/01/21  1337 05/27/21  1720 11/24/20  1325 05/08/20  1225   UTPG 0.76* 0.56* 0.28* 0.41*     PTH  Recent Labs   Lab Test 12/01/21  1359 11/24/20  1345 08/06/19  1234   PTHI 28 29 22     IRON STUDIES  Recent Labs   Lab Test 05/27/21  1729 11/24/20  1345 08/21/20  1510   IRON 69 105 123    249 235*   IRONSAT 26 42 52*   NELY 935* 1,104* 1,098*       Cinda Cazares, NP

## 2021-12-03 NOTE — TELEPHONE ENCOUNTER
ISSUE:   Tacrolimus IR level 10.8 on 12/1, goal 5-8, dose4 mg BID.    PLAN:   Please call patient and confirm this was an accurate 12-hour trough. Verify Tacrolimus IR dose 4 mg BID. Confirm no new medications or illness. Confirm no missed doses. If accurate trough and accurate dose, decrease Tacrolimus IR dose to 4 mg AM and 3 mg PM and repeat labs as scheduled.    OUTCOME:   Spoke with patient, they confirm accurate trough level and current dose 4 mg BID. Patient confirmed dose change to 4 mg am, 3 mg pm and to repeat labs in 1 weeks. Orders sent to preferred pharmacy for dose change and lab for repeat labs. Patient voiced understanding of plan.    Zainab Arriaga LPN       *ADDENDUM*  Camacho responded to coComment message. He decreased TAC dose to 4 mg every AM and 3 mg every PM and will repeat his level next week per Dr. Camejo.

## 2021-12-03 NOTE — PROGRESS NOTES
HISTORY OF PRESENT ILLNESS:  I had the pleasure of seeing Camacho Bhagat for followup in the Liver Transplant Clinic at the [facility] on 12/03/2021.  Mr. Bhagat returns for followup now coming up on 5 years status post liver transplantation.    He is doing fairly well at this point in time.  He denies any abdominal pain, itching, skin rash, or fatigue.  He is working full-time.  He denies any increased abdominal girth or lower extremity edema.    He denies any fevers or chills, cough or shortness of breath.  He has received his third dose of the COVID-19 vaccine.  He denies any nausea or vomiting.  He does have chronic loose stools, which he manages with Imodium.  This in part relates to a partial colectomy that he had.  His appetite is good.  Unfortunately, his weight is up 30 pounds since he was last seen.      Current Outpatient Medications   Medication     alcohol swab prep pads     alpha-lipoic acid 600 MG capsule     amLODIPine (NORVASC) 10 MG tablet     blood glucose (NO BRAND SPECIFIED) lancets standard     blood glucose (NO BRAND SPECIFIED) test strip     blood glucose (NO BRAND SPECIFIED) test strip     blood glucose calibration (NO BRAND SPECIFIED) solution     blood glucose monitoring (NO BRAND SPECIFIED) meter device kit     Blood Glucose Monitoring Suppl (CONTOUR BLOOD GLUCOSE SYSTEM) w/Device KIT     cholecalciferol (VITAMIN D3) 1000 UNIT tablet     CIALIS 5 MG tablet     Continuous Blood Gluc Sensor (DEXCOM G6 SENSOR) MISC     Continuous Blood Gluc Transmit (DEXCOM G6 TRANSMITTER) MISC     cyclobenzaprine (FLEXERIL) 10 MG tablet     fluticasone (FLONASE) 50 MCG/ACT nasal spray     furosemide (LASIX) 40 MG tablet     gabapentin (NEURONTIN) 300 MG capsule     Glucose Blood (BLOOD GLUCOSE TEST STRIPS) STRP     Insulin Disposable Pump (OMNIPOD DASH 5 PACK PODS) MISC     Insulin Disposable Pump (OMNIPOD DASH 5 PACK PODS) MISC     insulin glargine (LANTUS SOLOSTAR) 100 UNIT/ML pen      Insulin Lispro (HUMALOG KWIKPEN) 200 UNIT/ML soln     Insulin Lispro (HUMALOG KWIKPEN) 200 UNIT/ML soln     insulin pen needle (BD ENE U/F) 32G X 4 MM miscellaneous     metoprolol succinate ER (TOPROL-XL) 200 MG 24 hr tablet     multivitamin, therapeutic with minerals (MULTI-VITAMIN) TABS tablet     mycophenolic acid (GENERIC EQUIVALENT) 360 MG EC tablet     nicotine (NICODERM CQ) 14 MG/24HR 24 hr patch     omeprazole (PRILOSEC) 20 MG DR capsule     oxyCODONE (ROXICODONE) 5 MG tablet     patiromer (VELTASSA) 8.4 g packet     predniSONE (DELTASONE) 2.5 MG tablet     sildenafil (REVATIO) 20 MG tablet     tacrolimus (GENERIC EQUIVALENT) 1 MG capsule     thin (NO BRAND SPECIFIED) lancets     ursodiol (ACTIGALL) 500 MG tablet     varenicline (CHANTIX KEVEN) 0.5 MG X 11 & 1 MG X 42 tablet     No current facility-administered medications for this visit.     BP (!) 197/97   Pulse 71   Wt 128.8 kg (284 lb)   SpO2 97%   BMI 38.52 kg/m      PHYSICAL EXAMINATION:    GENERAL:  He looks well.  HEENT:  Shows no scleral icterus or temporal muscle wasting.  CHEST:  Clear.  ABDOMEN:  No increase in girth.  No masses or tenderness to palpation are present.  His liver is 10 cm in span without left lobe enlargement.  No spleen tip is palpable.  EXTREMITIES:  No edema.  SKIN:  No stigmata of chronic liver disease.  NEUROLOGIC:  Nonfocal.    Recent Results (from the past 168 hour(s))   Protein  random urine with Creat Ratio    Collection Time: 12/01/21  1:37 PM   Result Value Ref Range    Total Protein Random Urine g/L 0.69 g/L    Total Protein Urine g/gr Creatinine 0.76 (H) 0.00 - 0.20 g/g Cr    Creatinine Urine mg/dL 91 mg/dL   Parathyroid Hormone Intact    Collection Time: 12/01/21  1:59 PM   Result Value Ref Range    Parathyroid Hormone Intact 28 pg/mL   Vitamin D Deficiency    Collection Time: 12/01/21  1:59 PM   Result Value Ref Range    Vitamin D, Total (25-Hydroxy) 41 20 - 75 ug/L   Renal panel    Collection Time: 12/01/21   1:59 PM   Result Value Ref Range    Sodium 140 133 - 144 mmol/L    Potassium 5.4 (H) 3.4 - 5.3 mmol/L    Chloride 115 (H) 94 - 109 mmol/L    Carbon Dioxide (CO2) 21 20 - 32 mmol/L    Anion Gap 4 3 - 14 mmol/L    Urea Nitrogen 39 (H) 7 - 30 mg/dL    Creatinine 1.48 (H) 0.66 - 1.25 mg/dL    Calcium 8.7 8.5 - 10.1 mg/dL    Glucose 85 70 - 99 mg/dL    Albumin 3.6 3.4 - 5.0 g/dL    Phosphorus 3.5 2.5 - 4.5 mg/dL    GFR Estimate 52 (L) >60 mL/min/1.73m2   CBC with platelets    Collection Time: 12/01/21  1:59 PM   Result Value Ref Range    WBC Count 6.2 4.0 - 11.0 10e3/uL    RBC Count 4.23 (L) 4.40 - 5.90 10e6/uL    Hemoglobin 12.9 (L) 13.3 - 17.7 g/dL    Hematocrit 40.0 40.0 - 53.0 %    MCV 95 78 - 100 fL    MCH 30.5 26.5 - 33.0 pg    MCHC 32.3 31.5 - 36.5 g/dL    RDW 13.3 10.0 - 15.0 %    Platelet Count 122 (L) 150 - 450 10e3/uL   Tacrolimus by Tandem Mass Spectrometry    Collection Time: 12/01/21  1:59 PM   Result Value Ref Range    Tacrolimus by Tandem Mass Spectrometry 10.8 5.0 - 15.0 ug/L    Tacrolimus Last Dose Date 12/1/2021     Tacrolimus Last Dose Time  1:00 AM    Lipid Profile    Collection Time: 12/01/21  1:59 PM   Result Value Ref Range    Cholesterol 157 <200 mg/dL    Triglycerides 203 (H) <150 mg/dL    Direct Measure HDL 38 (L) >=40 mg/dL    LDL Cholesterol Calculated 78 <=100 mg/dL    Non HDL Cholesterol 119 <130 mg/dL    Patient Fasting > 8hrs? Yes    Alkaline phosphatase    Collection Time: 12/01/21  1:59 PM   Result Value Ref Range    Alkaline Phosphatase 242 (H) 40 - 150 U/L   ALT    Collection Time: 12/01/21  1:59 PM   Result Value Ref Range    ALT 38 0 - 70 U/L   AST    Collection Time: 12/01/21  1:59 PM   Result Value Ref Range    AST 21 0 - 45 U/L   Bilirubin  total    Collection Time: 12/01/21  1:59 PM   Result Value Ref Range    Bilirubin Total 0.7 0.2 - 1.3 mg/dL   Bilirubin direct    Collection Time: 12/01/21  1:59 PM   Result Value Ref Range    Bilirubin Direct 0.2 0.0 - 0.2 mg/dL   Protein  total    Collection Time: 12/01/21  1:59 PM   Result Value Ref Range    Protein Total 7.2 6.8 - 8.8 g/dL      IMPRESSION:  Mr. Bhagat is doing well now 5 years status post liver transplantation.  We have lowered his tacrolimus dose.  He will get his kidney function and tacro level checked in 1 week.  I did strongly encourage him to lose weight and he believes that he will do better during the winter.  He does report that his diabetes has been under good control, however.  He, otherwise, does need to see a dermatologist for skin cancer screening and I will see him back in the clinic in 6 months for repeat blood work at that time.      Thank you very much for allowing me to participate in the care of this patient.  If you have any questions regarding my recommendations, please do not hesitate to contact me.         Toñito Camejo MD      Professor of Medicine  [employer]      Executive Medical Director, Solid Organ Transplant Program  [facility]

## 2021-12-06 ENCOUNTER — OFFICE VISIT (OUTPATIENT)
Dept: ENDOCRINOLOGY | Facility: CLINIC | Age: 56
End: 2021-12-06
Payer: COMMERCIAL

## 2021-12-06 VITALS
WEIGHT: 280 LBS | HEIGHT: 72 IN | DIASTOLIC BLOOD PRESSURE: 92 MMHG | BODY MASS INDEX: 37.93 KG/M2 | HEART RATE: 91 BPM | SYSTOLIC BLOOD PRESSURE: 166 MMHG

## 2021-12-06 DIAGNOSIS — Z79.4 TYPE 2 DIABETES MELLITUS WITH DIABETIC NEPHROPATHY, WITH LONG-TERM CURRENT USE OF INSULIN (H): Primary | ICD-10-CM

## 2021-12-06 DIAGNOSIS — E11.21 TYPE 2 DIABETES MELLITUS WITH DIABETIC NEPHROPATHY, WITH LONG-TERM CURRENT USE OF INSULIN (H): Primary | ICD-10-CM

## 2021-12-06 LAB — HBA1C MFR BLD: 5.3 % (ref 4.3–?)

## 2021-12-06 PROCEDURE — 95251 CONT GLUC MNTR ANALYSIS I&R: CPT | Performed by: INTERNAL MEDICINE

## 2021-12-06 PROCEDURE — 83036 HEMOGLOBIN GLYCOSYLATED A1C: CPT | Performed by: INTERNAL MEDICINE

## 2021-12-06 PROCEDURE — 99215 OFFICE O/P EST HI 40 MIN: CPT | Performed by: INTERNAL MEDICINE

## 2021-12-06 ASSESSMENT — MIFFLIN-ST. JEOR: SCORE: 2133.07

## 2021-12-06 ASSESSMENT — PAIN SCALES - GENERAL: PAINLEVEL: NO PAIN (0)

## 2021-12-06 NOTE — LETTER
12/6/2021       RE: Camacho Bhagat  [address]  [facility] [address]     Dear Colleague,    Thank you for referring your patient, Camacho Bhagat, to the [facility] ENDOCRINOLOGY [facility] [place] [facility]. Please see a copy of my visit note below.      SUBJECTIVE:  Camacho Bhagat is a 56 year old male with PMHx of CASTAÑEDA/HCC s/p liver transplant (3/2017), fibromyalgia, DVT, HTN, RONNIE, OA, and CVA who presents for follow-up of type 2 DM, diagnosed in 2002.  Oral meds first, later placed on insulin.     Related history: h/o CASTAÑEDA cirrhosis and HCC s/p liver transplant 3/2017. Course complicated by acute abdomen/neutropenic fever requiring right hemicolectomy and colostomy Fall 2017. He later underwent colostomy takedown 1/5/18 which was complicated by abdominal wall abscess at a drain site. During this course, he has also developed excessive proteinuria which is followed by nephrology. .    Hemoglobin A1c today was 5.3%.    The patient transition to Omnipod insulin pump and Dexcom sensor in August 2021.  I reviewed both insulin pump and sensor records.    Insulin pump settings:  Basal rate 1.4 units/h  Insulin to carbohydrate ratio 1 unit per 8 g  Correction 34  Active insulin time 3 hours  Blood glucose target 100  Correction above 125    Total daily insulin dose is 62 units, of which 54 is basal insulin.  He very rarely overrides the insulin pump settings.  In a regular day, he has 2 meals (late breakfast and dinner) and he rarely snacks (he cannot identify the time of the day when he might have a snack).  Denies having anything to eat after 8 PM.  At bedtime, he does take a correction bolus.  On the sensor, the average glucose is 156, with a standard deviation of 55, corresponding to a GMI of 7%.  62% of the sensor glucose numbers are within target of , 5% are above 250, 3% are in the hypoglycemic range and less than 1% are below 54.  There are  rare episodes when the blood sugar is consistently low on the sensor, 4 hours.  The patient remembers not being symptomatic at that time.  The sensor reveals a trend towards mild hypoglycemia predinner, the days when he might not have any snacks, and a mildly elevated blood sugar late evening.    He has progressively gained weight over the last year and the patient attributes this to decreased physical activity, now restricted to walking and playing with his dog.    Related meds:  Prednisone started for transplant and the dose remains 2.5 mg per day.     His blood pressure is now treated with a maximum dose of metoprolol and Lasix, 40 mg twice daily.  The patient has had the blood pressure addressed with nephrology, during a recent visit.    Complications  + chronic complications.      1. Retinopathy: No known eye disease  Last eye exam was 11/12/19.  H/o . He has cataracts.       2.  Nephropathy: yes. CKD IIIa . He has significant proteinuria and f/up with nephrology.  Recent GFR in the 50s. He was started on losartan on 4/4/18 and the dose was increased to 50 mg daily. Discontinued losartan around 3/2019 due to hyperkalemia.     3. Neuropathy.  Gabapentin was prescribed to control the neuropathic pain in the left foot, which started over 10 years ago.  No ulcers or infection. No amputation.  He now has bilateral foot pain.     4. Lipids: not on statin . last LDL 78 this month    In general, he is able to recognize the hypoglycemic episodes: He feels flushed, warm and dizzy.  Denies experiencing tremor or palpitations.  The lowest blood glucose he remembers experiencing was in the 50s.  He does have glucagon injection at home.     Past Medical History  Past Medical History:   Diagnosis Date     Depressive disorder, not elsewhere classified      Diabetes type 2, controlled (H) 11/10/2016     Esophageal reflux      Fibromyalgia 1/2009    dx with Dr Benitez( Rheum)     Gangrene of finger (H) 8/25/2017     H/O deep  venous thrombosis 11/2001    Permanent IVC filter in place.     H/O CASTAÑEDA (nonalcoholic steatohepatitis)      H/O Pneumonia, organism unspecified(486) 10/2001    Included ARDS, sepsis, and  acute renal failure; hospitalized, required tracheostomy placement.     H/O: HTN (hypertension) 11/2001    No longer prescribed antihypertensive medication.       History of CVA (cerebrovascular accident)      History of hepatocellular carcinoma      History of liver transplant (H)      History of obstructive sleep apnea     No longer wears CPAP since losing approximately 200 pounds with his liver transplant and its complications.       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     Osteoarthritis      Presence of PERMANENT IVC filter      Rheumatoid arthritis(714.0)      Past Surgical History  Past Surgical History:   Procedure Laterality Date     BENCH LIVER N/A 3/4/2017    Procedure: BENCH LIVER;  Surgeon: Jovan Tran MD;  Location:  OR      TOTAL KNEE ARTHROPLASTY  2008    Right knee arthroscopy     CHOLECYSTECTOMY       COLONOSCOPY N/A 7/21/2017    Procedure: COMBINED COLONOSCOPY, SINGLE OR MULTIPLE BIOPSY/POLYPECTOMY BY BIOPSY;  Colonoscopy;  Surgeon: Izaiah Montes MD;  Location: [facility]     ENDOSCOPIC RETROGRADE CHOLANGIOPANCREATOGRAM N/A 5/22/2018    Procedure: COMBINED ENDOSCOPIC RETROGRADE CHOLANGIOPANCREATOGRAPHY, PLACE TUBE/STENT;  Endoscopic Retrograde Cholangiopancreatography with sphincterotomy and pancreatic duct stent placement;  Surgeon: Zay Gaitan MD;  Location: [facility] OR     ENT SURGERY      Repair of deviated septum     ESOPHAGOSCOPY, GASTROSCOPY, DUODENOSCOPY (EGD), COMBINED N/A 8/4/2016    Procedure: COMBINED ESOPHAGOSCOPY, GASTROSCOPY, DUODENOSCOPY (EGD), BIOPSY SINGLE OR MULTIPLE;  Surgeon: Trent Pederson MD;   Location: [facility] GI     ESOPHAGOSCOPY, GASTROSCOPY, DUODENOSCOPY (EGD), COMBINED N/A 2017    Procedure: COMBINED ESOPHAGOSCOPY, GASTROSCOPY, DUODENOSCOPY (EGD);;  Surgeon: Los Wynn MD;  Location: [facility]     INCISION AND DRAINAGE ABDOMEN WASHOUT, COMBINED Right 2018    Procedure: COMBINED INCISION AND DRAINAGE ABDOMEN WASHOUT;  COMBINED INCISION AND DRAINAGE right ABDOMEN flank;  Surgeon: Sara Dinh MD;  Location: [facility]     LAPAROTOMY EXPLORATORY N/A 2017    Procedure: LAPAROTOMY EXPLORATORY;  Exploratory Laparotomy, washout;  Surgeon: Tip Zhang MD;  [facility]: [facility]     LAPAROTOMY EXPLORATORY N/A 2017    Procedure: LAPAROTOMY EXPLORATORY;  Exploratory Laparotomy, Washout with closure.;  Surgeon: Tip Zhang MD;  [facility]     LAPAROTOMY EXPLORATORY N/A 2017    Procedure: LAPAROTOMY EXPLORATORY;  Exploratory Laparotomy, Right angelique-colectomy, end ileostomy, mucosal fistula, partial omentectomy;  Surgeon: Sara Dinh MD;  Location: [facility]     ORTHOPEDIC SURGERY Right     Repair of dislocated wrist.       RELEASE TRIGGER FINGER Right 11/10/2017    Procedure: RELEASE TRIGGER FINGER;  Debride, V-Y Flap Right Index Finger;  Surgeon: Momo Noonan MD;  [facility]     TAKEDOWN ILEOSTOMY N/A 2018    Procedure: TAKEDOWN ILEOSTOMY;  Exploratory Laparotomy, Lysis of Adhesions, Takedown Of End Ileostomy, Takedown of mucocutaneous fistula, ileocolic resection  And Colorectal Anastomosis, Primary repair of Ventral Hernia, Anesthesia Block ;  Surgeon: Sara Dinh MD;  Location: [facility]     TRACHEOSTOMY  2001    During hospitalization for pneumonia.       TRANSPLANT LIVER RECIPIENT  DONOR N/A 3/4/2017    Procedure: TRANSPLANT LIVER RECIPIENT  DONOR;  Surgeon: Jovan Tran MD;  Location: [facility]        Medications    Current Outpatient Medications:      alcohol swab prep pads, Use to swab area  of injection/francisca as directed., Disp: 100 each, Rfl: 3     alpha-lipoic acid 600 MG capsule, Take 600 mg by mouth, Disp: , Rfl:      amLODIPine (NORVASC) 10 MG tablet, Take 1 tablet (10 mg) by mouth daily, Disp: 90 tablet, Rfl: 3     blood glucose (NO BRAND SPECIFIED) lancets standard, Use to test blood sugar 1 times daily or as directed., Disp: 100 lancet, Rfl: 3     blood glucose (NO BRAND SPECIFIED) test strip, Use to test blood sugar 1 times daily or as directed., Disp: 50 strip, Rfl: 11     blood glucose (NO BRAND SPECIFIED) test strip, Use to test blood sugar 3 times daily or as directed. Any covered brand preferred by Pt that works with meter., Disp: 300 strip, Rfl: 3     blood glucose calibration (NO BRAND SPECIFIED) solution, Use to calibrate meter., Disp: 1 Bottle, Rfl: 3     blood glucose monitoring (NO BRAND SPECIFIED) meter device kit, Use to test blood sugar 3 times daily or as directed. Any covered brand preferred by Pt., Disp: 1 kit, Rfl: 1     Blood Glucose Monitoring Suppl (CONTOUR BLOOD GLUCOSE SYSTEM) w/Device KIT, For use with the Omnipod Dash insulin pump, Disp: 1 kit, Rfl: 1     cholecalciferol (VITAMIN D3) 1000 UNIT tablet, Take 1 tablet (1,000 Units) by mouth daily (Patient taking differently: Take 1,000 Units by mouth every morning ), Disp: 100 tablet, Rfl: 3     CIALIS 5 MG tablet, Take 5 mg by mouth as needed , Disp: , Rfl: 1     Continuous Blood Gluc Sensor (DEXCOM G6 SENSOR) MISC, Change every 10 days., Disp: 9 each, Rfl: 3     Continuous Blood Gluc Transmit (DEXCOM G6 TRANSMITTER) MISC, Change every 3 months., Disp: 1 each, Rfl: 3     cyclobenzaprine (FLEXERIL) 10 MG tablet, Take 1 tablet (10 mg) by mouth 3 times daily as needed for muscle spasms, Disp: 90 tablet, Rfl: 3     fluticasone (FLONASE) 50 MCG/ACT nasal spray, Spray 1 spray into both nostrils daily, Disp: 15 mL, Rfl: 1     furosemide (LASIX) 40 MG tablet, Take 1 tablet (40 mg) by mouth 2 times daily, Disp: 180 tablet, Rfl:  3     gabapentin (NEURONTIN) 300 MG capsule, Take 2 capsules (600 mg) by mouth 3 times daily, Disp: 540 capsule, Rfl: 3     Glucose Blood (BLOOD GLUCOSE TEST STRIPS) STRP, 1 strip by Lancet route 3 times daily, Disp: 300 each, Rfl: 3     Insulin Disposable Pump (OMNIPOD DASH 5 PACK PODS) MISC, 1 each every 48 hours, Disp: 45 each, Rfl: 3     Insulin Disposable Pump (OMNIPOD DASH 5 PACK PODS) MISC, 1 each every 3 days, Disp: 6 each, Rfl: 3     insulin glargine (LANTUS SOLOSTAR) 100 UNIT/ML pen, Inject 65 Units Subcutaneous every morning, Disp: 60 mL, Rfl: 3     Insulin Lispro (HUMALOG KWIKPEN) 200 UNIT/ML soln, Up to 100 units per day per insulin pump, Disp: 30 mL, Rfl: 3     Insulin Lispro (HUMALOG KWIKPEN) 200 UNIT/ML soln, Use one unit per 3 grams carbohydrates for all meals and snacks. Total daily dose up to 100 U., Disp: 45 mL, Rfl: 3     insulin pen needle (BD ENE U/F) 32G X 4 MM miscellaneous, Use 1 pen needle 4 times daily or as directed., Disp: 400 each, Rfl: 1     metoprolol succinate ER (TOPROL-XL) 200 MG 24 hr tablet, Take 1 tablet (200 mg) by mouth daily, Disp: 90 tablet, Rfl: 3     multivitamin, therapeutic with minerals (MULTI-VITAMIN) TABS tablet, Take 1 tablet by mouth every morning, Disp: , Rfl:      mycophenolic acid (GENERIC EQUIVALENT) 360 MG EC tablet, TAKE 2 TABLETS (720 MG) BY MOUTH EVERY 12 HOURS, Disp: 360 tablet, Rfl: 3     nicotine (NICODERM CQ) 14 MG/24HR 24 hr patch, Place 1 patch onto the skin every 24 hours, Disp: 28 patch, Rfl: 11     omeprazole (PRILOSEC) 20 MG DR capsule, Take 1 capsule (20 mg) by mouth daily, Disp: 90 capsule, Rfl: 3     oxyCODONE (ROXICODONE) 5 MG tablet, Take 1 tablet (5 mg) by mouth every 4 hours as needed for pain maximum 6 tablet(s) per day, Disp: 30 tablet, Rfl: 0     patiromer (VELTASSA) 8.4 g packet, Take 8.4 g by mouth daily, Disp: 90 each, Rfl: 3     predniSONE (DELTASONE) 2.5 MG tablet, Take 1 tablet (2.5 mg) by mouth daily, Disp: 90 tablet, Rfl: 3      sildenafil (REVATIO) 20 MG tablet, Take 2 tablets (40 mg) by mouth daily as needed (erectile dysfunction), Disp: 10 tablet, Rfl: 11     tacrolimus (GENERIC EQUIVALENT) 1 MG capsule, Take 4 capsules (4 mg) by mouth every 12 hours, Disp: 730 capsule, Rfl: 3     thin (NO BRAND SPECIFIED) lancets, Use with lanceting device. Any covered brand., Disp: 300 each, Rfl: 3     ursodiol (ACTIGALL) 500 MG tablet, Take 1 tablet (500 mg) by mouth 2 times daily, Disp: 180 tablet, Rfl: 3     varenicline (CHANTIX KEVEN) 0.5 MG X 11 & 1 MG X 42 tablet, Take 0.5 mg tab daily for 3 days, THEN 0.5 mg tab twice daily for 4 days, THEN 1 mg twice daily., Disp: 53 tablet, Rfl: 0    Social history:   . Former . He used to work as a nurse at [employer].  Former smoker for 2 years, 0.75 packs/day, quit in 1988.  He used to chew tobacco and he stopped this in 2015.  He has not been drinking any alcohol since the liver transplant. 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     OBJECTIVE:    Wt Readings from Last 10 Encounters:   12/06/21 127 kg (280 lb)   12/03/21 128.8 kg (284 lb)   06/07/21 114.3 kg (252 lb)   06/01/21 113.9 kg (251 lb)   06/01/21 114.3 kg (251 lb 14.4 oz)   12/14/20 109.8 kg (242 lb)   12/02/20 109.8 kg (242 lb)   10/20/20 112.5 kg (248 lb)   09/29/20 112.5 kg (248 lb)   05/19/20 105.7 kg (233 lb)     BP (!) 166/92   Pulse 91   Ht 1.829 m (6')   Wt 127 kg (280 lb)   BMI 37.97 kg/m       Physical exam  General appearance: No distress noted, General obesity.  Normal male hair pattern.   Bilateral gynecomastia with no palpable nodules  Eyes: conjunctivae and extraocular motions are normal. Pupils are equal, round, and reactive to light. No lid lag, no stare.  HENT: oropharynx clear and moist; neck no JVD, no bruits, no thyromegaly, no palpable nodules  Cardiovascular: regular rhythm, no murmurs, distal pulses palpable, no edema  Respiratory: chest clear, no rales, no  rhonchi  Gastrointestinal: abdomen soft, nontender, nondistended, +BS, no organomegaly  Musculoskeletal: normal tone and strength   Neurologic: reflexes normal and symmetric, no resting tremor  Psychiatric: affect and judgment normal   Skin: Significant abdominal scarring  Feet: Onychomycosis, sensation impaired to monofilament testing on the soles of both feet.    Lab Results   Component Value Date    A1C 7.1 (H) 08/18/2021    A1C 6.3 (H) 03/31/2021    A1C 6.7 (H) 02/12/2021    A1C 6.4 (H) 11/24/2020    A1C 6.2 (H) 10/06/2020    A1C 5.5 04/11/2018    HEMOGLOBINA1 5.8 11/18/2019    HEMOGLOBINA1 6.1 (A) 11/19/2018       Hemoglobin   Date Value Ref Range Status   12/01/2021 12.9 (L) 13.3 - 17.7 g/dL Final   05/27/2021 11.9 (L) 13.3 - 17.7 g/dL Final     Hematocrit   Date Value Ref Range Status   12/01/2021 40.0 40.0 - 53.0 % Final   05/27/2021 34.7 (L) 40.0 - 53.0 % Final     Cholesterol   Date Value Ref Range Status   12/01/2021 157 <200 mg/dL Final   11/24/2020 134 <200 mg/dL Final     Cholesterol/HDL Ratio   Date Value Ref Range Status   07/03/2012 3.8 0.0 - 5.0 Final     HDL Cholesterol   Date Value Ref Range Status   11/24/2020 39 (L) >39 mg/dL Final     Direct Measure HDL   Date Value Ref Range Status   12/01/2021 38 (L) >=40 mg/dL Final     LDL Cholesterol Calculated   Date Value Ref Range Status   12/01/2021 78 <=100 mg/dL Final   11/24/2020 35 <100 mg/dL Final     Comment:     Desirable:       <100 mg/dl     VLDL-Cholesterol   Date Value Ref Range Status   07/03/2012 36 (H) 0 - 30 mg/dL Final     Triglycerides   Date Value Ref Range Status   12/01/2021 203 (H) <150 mg/dL Final   11/24/2020 298 (H) <150 mg/dL Final     Comment:     Borderline high:  150-199 mg/dl  High:             200-499 mg/dl  Very high:       >499 mg/dl       Albumin Urine mg/L   Date Value Ref Range Status   10/27/2017 122 mg/L Final     TSH   Date Value Ref Range Status   04/02/2018 2.74 0.40 - 4.00 mU/L Final     Last Basic Metabolic  Panel:    Sodium   Date Value Ref Range Status   12/01/2021 140 133 - 144 mmol/L Final   06/08/2021 134 133 - 144 mmol/L Final     Potassium   Date Value Ref Range Status   12/01/2021 5.4 (H) 3.4 - 5.3 mmol/L Final   06/08/2021 4.9 3.4 - 5.3 mmol/L Final     Chloride   Date Value Ref Range Status   12/01/2021 115 (H) 94 - 109 mmol/L Final   06/08/2021 105 94 - 109 mmol/L Final     Calcium   Date Value Ref Range Status   12/01/2021 8.7 8.5 - 10.1 mg/dL Final   06/08/2021 8.9 8.5 - 10.1 mg/dL Final     Carbon Dioxide   Date Value Ref Range Status   06/08/2021 23 20 - 32 mmol/L Final     Carbon Dioxide (CO2)   Date Value Ref Range Status   12/01/2021 21 20 - 32 mmol/L Final     Urea Nitrogen   Date Value Ref Range Status   12/01/2021 39 (H) 7 - 30 mg/dL Final   06/08/2021 43 (H) 7 - 30 mg/dL Final     Creatinine   Date Value Ref Range Status   12/01/2021 1.48 (H) 0.66 - 1.25 mg/dL Final   06/08/2021 1.51 (H) 0.66 - 1.25 mg/dL Final     GFR Estimate   Date Value Ref Range Status   12/01/2021 52 (L) >60 mL/min/1.73m2 Final     Comment:     As of July 11, 2021, eGFR is calculated by the CKD-EPI creatinine equation, without race adjustment. eGFR can be influenced by muscle mass, exercise, and diet. The reported eGFR is an estimation only and is only applicable if the renal function is stable.   06/08/2021 51 (L) >60 mL/min/[1.73_m2] Final     Comment:     Non  GFR Calc  Starting 12/18/2018, serum creatinine based estimated GFR (eGFR) will be   calculated using the Chronic Kidney Disease Epidemiology Collaboration   (CKD-EPI) equation.       Glucose   Date Value Ref Range Status   12/01/2021 85 70 - 99 mg/dL Final   06/08/2021 249 (H) 70 - 99 mg/dL Final       AST   Date Value Ref Range Status   12/01/2021 21 0 - 45 U/L Final   05/27/2021 19 0 - 45 U/L Final     ALT   Date Value Ref Range Status   12/01/2021 38 0 - 70 U/L Final   05/27/2021 32 0 - 70 U/L Final     Albumin Urine mg/g Cr   Date Value Ref Range  Status   10/27/2017 132.46 (H) 0 - 17 mg/g Cr Final     ASSESSMENT/PLAN:    Camacho Bhagat is a 56 year old man w/ PMx of CASTAÑEDA/HCC s/p liver transplant (3/2017), fibromyalgia, DVT, HTN, RONNIE, OA, and CVA who presents for follow-up of his DM-II     1. Type 2 Diabetes  A1c less reliable in the context of mild stable anemia.  Overall, the glycemic control is good.  Recommended minor changes in the insulin pump settings in order to address postprandial and nocturnal hyperglycemia, and the tendency of the blood sugar to decrease prior to dinner.    Recommendations:  Change basal rate: 12 midnight 1.45 U/hr; 3 am 1.4 U/hr; 3 pm 1.35 U/hr; 6 PM 1.4 U/hr, 9 PM 1.45 U/hr   Change insulin to carb ratio to 1 U per 7.5 gm CHO   Change correction to 30   For asymptomatic hypoglycemic episodes noted on the sensor, always check the blood sugar using the glucometer.  Bolus for all meals and snacks, according to the carbohydrate intake  Schedule an eye exam   Have the pump downloaded for our review as needed  Return to clinic in 3 to 4 months    Discussed with the patient the option of considering treatment with GLP-1 agonist in order to address the weight gain.  He prefers to work on his diet for now.    2.  Hypertension, uncontrolled  The patient prefers to follow-up on this with nephrology.    42 minutes spent on the date of the encounter doing chart review, history and exam, documentation and further activities as noted above.     Answers for HPI/ROS submitted by the patient on 12/1/2021  General Symptoms: No  Skin Symptoms: No  HENT Symptoms: No  EYE SYMPTOMS: No  HEART SYMPTOMS: No  LUNG SYMPTOMS: No  INTESTINAL SYMPTOMS: No  URINARY SYMPTOMS: No  REPRODUCTIVE SYMPTOMS: Yes  SKELETAL SYMPTOMS: Yes  BLOOD SYMPTOMS: No  NERVOUS SYSTEM SYMPTOMS: No  MENTAL HEALTH SYMPTOMS: No  Scrotal pain or swelling: No  Erectile dysfunction: Yes  Penile discharge: No  Genital ulcers: No  Reduced libido: No  Back pain: Yes  Muscle aches: No  Neck  pain: Yes  Swollen joints: No  Joint pain: Yes  Bone pain: No  Muscle cramps: No  Muscle weakness: No  Joint stiffness: Yes  Bone fracture: No            Again, thank you for allowing me to participate in the care of your patient.      Sincerely,    Alisa West MD

## 2021-12-06 NOTE — PATIENT INSTRUCTIONS
Insulin pump changes:   Change basal rate: 12 midnight 1.45 U/hr; 3 am 1.4 U/hr; 3 pm 1.35 U/hr; 6 PM 1.4 U/hr, 9 PM 1.45 U/hr   Change insulin to carb ratio to 1 U per 7.5 gm CHO   Change correction to 30         We appreciate your assistance in coordinating your healthcare.     Please upload your insulin pump, blood sugar meter and/or continuous glucose monitor at home 1-2 days before your next diabetes-related appointment.   This will allow your provider to review your  data before your scheduled virtual visit.    To ask a question to your Endocrine care team, please send them a [facility] message, or reach them by phone at [phone]     To expedite your medication refill(s), please contact your pharmacy and have them   fax a refill request to: [phone].  *Please allow 3 business days for routine medication refills.  *Please allow 5 business days for controlled substance medication refills.    For after-hours urgent Endocrine issues, that do not require 521, please dial [phone], and ask to speak with the Endocrinologist On-Call

## 2021-12-07 RX ORDER — TACROLIMUS 1 MG/1
CAPSULE ORAL
Qty: 630 CAPSULE | Refills: 3 | Status: SHIPPED | OUTPATIENT
Start: 2021-12-07 | End: 2022-08-16

## 2021-12-08 DIAGNOSIS — Z94.4 LIVER REPLACED BY TRANSPLANT (H): ICD-10-CM

## 2021-12-08 RX ORDER — URSODIOL 500 MG/1
TABLET, FILM COATED ORAL
Qty: 180 TABLET | Refills: 3 | Status: SHIPPED | OUTPATIENT
Start: 2021-12-08 | End: 2022-12-05

## 2021-12-10 ENCOUNTER — LAB (OUTPATIENT)
Dept: LAB | Facility: CLINIC | Age: 56
End: 2021-12-10
Payer: COMMERCIAL

## 2021-12-10 DIAGNOSIS — Z13.220 LIPID SCREENING: ICD-10-CM

## 2021-12-10 DIAGNOSIS — Z94.4 LIVER REPLACED BY TRANSPLANT (H): ICD-10-CM

## 2021-12-10 DIAGNOSIS — N18.31 STAGE 3A CHRONIC KIDNEY DISEASE (H): ICD-10-CM

## 2021-12-10 LAB
ALBUMIN SERPL-MCNC: 3.6 G/DL (ref 3.4–5)
ALP SERPL-CCNC: 228 U/L (ref 40–150)
ALT SERPL W P-5'-P-CCNC: 29 U/L (ref 0–70)
ANION GAP SERPL CALCULATED.3IONS-SCNC: 4 MMOL/L (ref 3–14)
AST SERPL W P-5'-P-CCNC: 20 U/L (ref 0–45)
BILIRUB DIRECT SERPL-MCNC: 0.2 MG/DL (ref 0–0.2)
BILIRUB SERPL-MCNC: 0.6 MG/DL (ref 0.2–1.3)
BUN SERPL-MCNC: 39 MG/DL (ref 7–30)
CALCIUM SERPL-MCNC: 9 MG/DL (ref 8.5–10.1)
CHLORIDE BLD-SCNC: 111 MMOL/L (ref 94–109)
CO2 SERPL-SCNC: 24 MMOL/L (ref 20–32)
CREAT SERPL-MCNC: 1.46 MG/DL (ref 0.66–1.25)
GFR SERPL CREATININE-BSD FRML MDRD: 53 ML/MIN/1.73M2
GLUCOSE BLD-MCNC: 65 MG/DL (ref 70–99)
PHOSPHATE SERPL-MCNC: 3.1 MG/DL (ref 2.5–4.5)
POTASSIUM BLD-SCNC: 5.2 MMOL/L (ref 3.4–5.3)
PROT SERPL-MCNC: 6.9 G/DL (ref 6.8–8.8)
SODIUM SERPL-SCNC: 139 MMOL/L (ref 133–144)

## 2021-12-10 PROCEDURE — 80076 HEPATIC FUNCTION PANEL: CPT

## 2021-12-10 PROCEDURE — 84450 TRANSFERASE (AST) (SGOT): CPT

## 2021-12-10 PROCEDURE — 84100 ASSAY OF PHOSPHORUS: CPT

## 2021-12-10 PROCEDURE — 36415 COLL VENOUS BLD VENIPUNCTURE: CPT

## 2021-12-10 PROCEDURE — 84155 ASSAY OF PROTEIN SERUM: CPT

## 2021-12-10 PROCEDURE — 80197 ASSAY OF TACROLIMUS: CPT

## 2021-12-10 PROCEDURE — 82248 BILIRUBIN DIRECT: CPT

## 2021-12-10 PROCEDURE — 84075 ASSAY ALKALINE PHOSPHATASE: CPT

## 2021-12-10 PROCEDURE — 82247 BILIRUBIN TOTAL: CPT

## 2021-12-10 PROCEDURE — 84460 ALANINE AMINO (ALT) (SGPT): CPT

## 2021-12-11 LAB
TACROLIMUS BLD-MCNC: 4.8 UG/L (ref 5–15)
TME LAST DOSE: ABNORMAL H
TME LAST DOSE: ABNORMAL H

## 2021-12-21 ENCOUNTER — TELEPHONE (OUTPATIENT)
Dept: NEPHROLOGY | Facility: CLINIC | Age: 56
End: 2021-12-21
Payer: COMMERCIAL

## 2021-12-21 NOTE — TELEPHONE ENCOUNTER
Prior Authorization Retail Medication Request    Medication/Dose: veltassa 8.4 grams daily  ICD code (if different than what is on RX):  Hyperkalemia E87.5  Previously Tried and Failed:    Rationale:  For management of high potassium levels    Insurance Name:  [facility]  Insurance ID:  [id number]      Pharmacy Information (if different than what is on RX)  Name:  [facility]   Phone:  [phone]

## 2021-12-22 NOTE — TELEPHONE ENCOUNTER
Central Prior Authorization Team   Phone: [phone]    Tried to submit P/A to patient's pharmacy benefits provider, [facility]. They defer to patient's health plan for the Prior Auth requests, TAMARA GONZALEZ Initiation    Medication: patiromer (VELTASSA) 8.4 g packet  Insurance Company: [employer] [place] - Phone [phone] Fax [phone]  Pharmacy Filling the Rx: [facility] [address]  Filling Pharmacy Phone: [phone]  Filling Pharmacy Fax:    Start Date: 12/22/2021

## 2021-12-23 NOTE — TELEPHONE ENCOUNTER
Prior Authorization Approval    Authorization Effective Date: 12/22/2021  Authorization Expiration Date: 12/22/2023  Medication: patiromer (VELTASSA) 8.4 g packet  Approved Dose/Quantity:  Reference #:     Insurance Company: [employer] - Phone [phone] Fax [phone]  Expected CoPay:       Which Pharmacy is filling the prescription (Not needed for infusion/clinic administered): [place], [place] - [address] [place] [address]  Pharmacy Notified: Yes  Patient Notified: No

## 2021-12-26 NOTE — PROGRESS NOTES
"ASSESSMENT/PLAN:  Diabetes with CKD IIIa and neuropathy - is working with diabetes educator on adjusting his insulin using feedback from his continuous glucose monitor, using insulin pump managed by endocrine. Much better glucoses recently.    Chewing tobacco - I had started him on Chantix and nicotine back in July 2021 - he had to stop the Chantix because of vivid dreams. He has cut back on the tobacco.    Low back pain -he likely has a lumbar radiculopathy given the location of the pain and the radiation of the pain.  On occasional oxycodone and uses non-invasive pain treatments such as heat and ice, lidocaine patch may help when he has a bad exacerbation.  I did prescribe the lidocaine patch and also refilled his cyclobenzaprine and oxycodone.    Liver transplant after CASTAÑEDA and hepatocellular carcinoma - on tacrolimus, MMF, prednisone, ursodiol. Seen 12/3/21 and told he needs to see dermatology.    Hypertension - on metoprolol, amlodipine, and furosemide. Nephrology note mentions possible hydralazine as the next option.  He was stressed today with traffic he said which is likely the reason that he was elevated today he says.    CKD IIIa - had to stop ARB because of high potassium, recent note that he was approved for V[place]. On Vitamin D.    Neuropathy - on gabapentin and alphalipoic acid    Number and complexity of problems:  1 unstable chronic illness or with exacerbation  >3 stable chronic illnesses (diabetes, liver transplant, CKD, hypertension)    Amount and Complexity of Data Reviewed/Analyzed:  2 External notes reviewed    Risk of complication/morbidity of patient management:  Patient receiving prescription management    Shay Kirkpatrick MD, FACP      Chief complaint:  Camacho Bhagat is a 56 year old male presents for   Chief Complaint   Patient presents with     Recheck Medication     Back Pain        SUBJECTIVE:  \"Everything is going good\"    His back pain came up again. Now he has pain in the low back to " the buttocks and to the groin and to the knee. This comes and goes. He uses a heating pad and has a firm believer in getting up and moving so has been doing this. 6 months ago we tried to get a MRI but this was denied. The pain got slowly worse and then 2 weeks ago it hit hard.  The pain has gotten better now - he could not fall asleep. He took cyclobenzaprine and oxycodone (cannot take NSAIDs).  He does not want surgery. He is not sure what what might help.  What does help - ice/heat, muscle relaxant, oxycodone.     He says his colon spasm - like a muscle spasm.  It is in the middle of the abdomen. He says it will move from the RUQ to the LUQ.  He likes hot spicy foods (Lambert hot sauce for breakfast).    Medications and allergies were reviewed by me today.       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   Plan:  Increase tacrolimus dose, currently on low dose tacrolimus with level < 3.0.  Plan to increase for goal = 8.    Also on myfortic 720mg Q 12 hours.   Prednisone 2.5mg q day.           Abscess, intra-abdominal, postoperative 02/13/2018     Priority: Medium     Ileostomy in place (H) 01/05/2018     Priority: Medium     Peristomal skin complication 11/16/2017     Priority: Medium     Replacing diagnoses that were inactivated after the 10/1/2021 regulatory  import.       Painful periwound skin 11/16/2017     Priority: Medium     Encounter for attention to ileostomy (H) 11/16/2017     Priority: Medium     History of creation of ostomy (H) 10/30/2017     Priority: Medium     Elevated serum creatinine 10/16/2017     Priority: Medium     Pancytopenia (H) 08/25/2017     Priority: Medium     Gangrene of finger (H) 08/25/2017     Priority: Medium     Ischemia of both lower extremities 08/25/2017     Priority: Medium     Distal ischemia due to shock/high pressor requirements       S/P liver transplant (H) 07/26/2017     Priority: Medium     Neutropenic colitis (H) 07/04/2017     Priority: Medium     Immunosuppressed status (H) 03/10/2017     Priority: Medium     Liver transplant recipient (H) 03/04/2017     Priority: Medium     Hyperkalemia 02/14/2017     Priority: Medium     Hepatocellular carcinoma (H) 01/27/2016     Priority: Medium     Osteoarthritis 01/18/2015     Priority: Medium     Rheumatoid arthritis (H) 01/18/2015     Priority: Medium     Medication refill- do not delete  11/13/2013     Priority: Medium     Fibromyalgia 08/15/2011     Priority: Medium     Sicca syndrome (H) 08/15/2011     Priority: Medium     Testicular hypofunction      Priority: Medium     Problem list name updated by automated process. Provider to review       Carpal tunnel syndrome 07/08/2002     Priority: Medium       PHYSICAL EXAM:  BP (!) 161/89 (BP Location: Right arm, Patient Position: Sitting, Cuff Size: Adult Large)   Pulse 71   Ht 1.829 m (6')   SpO2 96%   BMI 37.97 kg/m    Constitutional: no distress, comfortable, pleasant   Eyes: anicteric, normal extra-ocular movements   Ears, Nose and Throat: tympanic membranes clear, throat clear, neck supple with full range of motion, no thyromegaly.   Cardiovascular: regular rate and rhythm, normal S1 and S2, no murmurs, rubs or gallops, peripheral pulses full and symmetric   Respiratory: clear to auscultation, no wheezes or crackles, normal  breath sounds   Gastrointestinal: positive bowel sounds, nontender, no hepatosplenomegaly, no masses   Musculoskeletal: full range of motion, no edema   Skin: no concerning lesions, no jaundice   Neurological: cranial nerves intact, normal strength and sensation, reflexes at patella and biceps normal, normal gait, no tremor   Psychological: appropriate mood   Lymphatic: no cervical lymphadenopathy

## 2021-12-27 ENCOUNTER — OFFICE VISIT (OUTPATIENT)
Dept: INTERNAL MEDICINE | Facility: CLINIC | Age: 56
End: 2021-12-27
Payer: COMMERCIAL

## 2021-12-27 VITALS
DIASTOLIC BLOOD PRESSURE: 89 MMHG | BODY MASS INDEX: 37.97 KG/M2 | OXYGEN SATURATION: 96 % | HEART RATE: 71 BPM | HEIGHT: 72 IN | SYSTOLIC BLOOD PRESSURE: 161 MMHG

## 2021-12-27 DIAGNOSIS — M15.0 PRIMARY OSTEOARTHRITIS INVOLVING MULTIPLE JOINTS: ICD-10-CM

## 2021-12-27 DIAGNOSIS — N18.31 STAGE 3A CHRONIC KIDNEY DISEASE (H): ICD-10-CM

## 2021-12-27 DIAGNOSIS — Z79.4 TYPE 2 DIABETES MELLITUS WITH DIABETIC POLYNEUROPATHY, WITH LONG-TERM CURRENT USE OF INSULIN (H): ICD-10-CM

## 2021-12-27 DIAGNOSIS — E11.42 TYPE 2 DIABETES MELLITUS WITH DIABETIC POLYNEUROPATHY, WITH LONG-TERM CURRENT USE OF INSULIN (H): ICD-10-CM

## 2021-12-27 DIAGNOSIS — D84.9 IMMUNOSUPPRESSION (H): ICD-10-CM

## 2021-12-27 DIAGNOSIS — M62.830 LUMBAR PARASPINAL MUSCLE SPASM: ICD-10-CM

## 2021-12-27 DIAGNOSIS — I10 ESSENTIAL HYPERTENSION: ICD-10-CM

## 2021-12-27 DIAGNOSIS — M54.16 LUMBAR RADICULOPATHY: Primary | ICD-10-CM

## 2021-12-27 PROBLEM — I96 GANGRENE OF FINGER (H): Status: RESOLVED | Noted: 2017-08-25 | Resolved: 2021-12-27

## 2021-12-27 PROCEDURE — 99214 OFFICE O/P EST MOD 30 MIN: CPT | Performed by: INTERNAL MEDICINE

## 2021-12-27 RX ORDER — CYCLOBENZAPRINE HCL 10 MG
10 TABLET ORAL 3 TIMES DAILY PRN
Qty: 90 TABLET | Refills: 3 | Status: SHIPPED | OUTPATIENT
Start: 2021-12-27 | End: 2022-12-05

## 2021-12-27 RX ORDER — METOPROLOL SUCCINATE 200 MG/1
200 TABLET, EXTENDED RELEASE ORAL DAILY
Qty: 90 TABLET | Refills: 3 | Status: SHIPPED | OUTPATIENT
Start: 2021-12-27 | End: 2022-12-05

## 2021-12-27 RX ORDER — OXYCODONE HYDROCHLORIDE 5 MG/1
5 TABLET ORAL EVERY 4 HOURS PRN
Qty: 30 TABLET | Refills: 0 | Status: SHIPPED | OUTPATIENT
Start: 2021-12-27 | End: 2022-03-31

## 2021-12-27 RX ORDER — LIDOCAINE 50 MG/G
1 PATCH TOPICAL EVERY 24 HOURS
Qty: 30 PATCH | Refills: 11 | Status: SHIPPED | OUTPATIENT
Start: 2021-12-27 | End: 2022-12-05

## 2021-12-27 ASSESSMENT — PAIN SCALES - GENERAL: PAINLEVEL: EXTREME PAIN (8)

## 2021-12-27 NOTE — NURSING NOTE
Chief Complaint   Patient presents with     Recheck Medication     Back Pain       MANNIE Walls at 9:36 AM on 12/27/2021.

## 2021-12-29 DIAGNOSIS — E87.5 HYPERKALEMIA: ICD-10-CM

## 2022-01-20 DIAGNOSIS — Z79.60 LONG-TERM USE OF IMMUNOSUPPRESSANT MEDICATION: ICD-10-CM

## 2022-01-20 DIAGNOSIS — Z94.4 LIVER REPLACED BY TRANSPLANT (H): ICD-10-CM

## 2022-01-20 RX ORDER — PREDNISONE 2.5 MG/1
TABLET ORAL
Qty: 90 TABLET | Refills: 3 | Status: SHIPPED | OUTPATIENT
Start: 2022-01-20 | End: 2023-01-31

## 2022-01-25 NOTE — TELEPHONE ENCOUNTER
"Pt returned call to confirm prednisone dose change. Reports he called after hours yesterday evening and wasn't able to get into a voicemail or speak with an on call coordinator, states \"You guys have this new phone system, and it doesn't accommodate nighttime people like me.\" Upset this is the second time he's tried to reach someone and can't get anyone on the phone. States, \"You guys should go back to the old system, it worked perfectly.\" Let pt know this issue will be escalated and apologized for his frustration.  " FAXED COMPLETED FMLA  N Jeanine Cole @ [phone]

## 2022-02-09 ENCOUNTER — OFFICE VISIT (OUTPATIENT)
Dept: DERMATOLOGY | Facility: CLINIC | Age: 57
End: 2022-02-09
Payer: COMMERCIAL

## 2022-02-09 DIAGNOSIS — D18.01 CHERRY ANGIOMA: ICD-10-CM

## 2022-02-09 DIAGNOSIS — L81.4 LENTIGINES: ICD-10-CM

## 2022-02-09 DIAGNOSIS — L82.0 SEBORRHEIC KERATOSIS, INFLAMED: ICD-10-CM

## 2022-02-09 DIAGNOSIS — D23.9 DERMATOFIBROMA: ICD-10-CM

## 2022-02-09 DIAGNOSIS — D22.9 MULTIPLE BENIGN NEVI: Primary | ICD-10-CM

## 2022-02-09 DIAGNOSIS — L82.1 SEBORRHEIC KERATOSIS: ICD-10-CM

## 2022-02-09 DIAGNOSIS — Z94.4 HISTORY OF LIVER TRANSPLANT (H): ICD-10-CM

## 2022-02-09 PROCEDURE — 99203 OFFICE O/P NEW LOW 30 MIN: CPT | Mod: 25 | Performed by: DERMATOLOGY

## 2022-02-09 PROCEDURE — 17110 DESTRUCTION B9 LES UP TO 14: CPT | Performed by: DERMATOLOGY

## 2022-02-09 ASSESSMENT — PAIN SCALES - GENERAL: PAINLEVEL: NO PAIN (0)

## 2022-02-09 NOTE — PROGRESS NOTES
[facility]  High Risk Non-Melanoma Skin Cancer Clinic Initial Consult Note  Encounter Date: Feb 9, 2022  Office Visit     Dermatology Problem List:  1. History of liver transplant 3/4/17  -tacrolimus, MMF, prednisone  SH: Works as a CMA.   ____________________________________________    Assessment & Plan:     # History of  Liver solid organ transplantation in the setting of CASTAÑEDA and hepatocellular carcinoma.   - We reviewed with the patient that due to the immunosuppressive medications used following transplant, solid organ transplant recipients are at a roughly 65-fold increased risk of squamous cell carcinoma, 20-fold increased risk of basal cell carcinoma, and 3.4 fold increased risk of melanoma compared to the general population.   - Based on the patient's risk factors and the Skin and Ultraviolet Neoplasia Transplant Risk Assessment Tool, the patient's risk of skin cancer following transplant is categorized as:  - High Risk: 5-Year Risk of 15.14% and 10-Year Risk of 31.75%  - We briefly reviewed prevention methods for reducing skin cancer after transplantation including avoidance of sun exposure, avoidance of indoor tanning beds or other indoor tanning devices, use of protective clothing (e.g. wide-brimmed hats, long sleeves, long pants), SPF 30 or greater sunscreen, and UV-protective sunglasses when outdoors.   - We discussed self skin examinations and partner-skin examinations.     # ISK, L lateral posterior neck  - Cryotherapy today, see procedure note below    # Benign lesions: Multiple benign nevi, solar lentigos, seborrheic keratoses, cherry angiomas. Explained to patient benign nature of lesion. No treatment is necessary at this time unless the lesion changes or becomes symptomatic.     - ABCDs of melanoma were discussed and self skin checks were advised.  - Sun precaution was advised including the use of sun screens of SPF 30 or higher, sun protective clothing, and  "avoidance of tanning beds.    Procedures Performed:   - Cryotherapy procedure note, location(s): See above. After verbal consent and discussion of risks and benefits including, but not limited to, dyspigmentation/scar, blister, and pain, 1 lesion(s) was(were) treated with 1-2 mm freeze border for 1-2 cycles with liquid nitrogen. Post cryotherapy instructions were provided.    Follow-up: 1 year(s) in-person, or earlier for new or changing lesions    Staff and Scribe:     Scribe Disclosure:  I, Comfort Allen, am serving as a scribe to document services personally performed by Jesse Gomez MD based on data collection and the provider's statements to me.     Provider Disclosure:   The documentation recorded by the scribe accurately reflects the services I personally performed and the decisions made by me.    Jesse Gomez MD    Department of Dermatology  [facility]: Phone: [phone], Fax:[phone]  [facility]: Phone: [phone] Fax: [phone]    ____________________________________________    CC: Skin Check (Camacho is here today for a skin check. He states \" one spot that I want checked today\")      HPI:  Mr. Camacho Bhagat is a(n) 56 year old male who presents today as a new patient for full body skin examination. He has a history of liver transplant in 2017 in setting of CASTAÑEDA and HCC.    The patient points to a lesion on his R calf that has been unchanged for many years. Patient is otherwise feeling well, without additional skin concerns. Now, he works as a [employer].     Type of Organ Transplant: Liver  Reason for Transplant: CASTAÑEDA and hepatocellular carcinoma  Date of Transplant: 3/4/17  Immunosuppression Regimen: tacrolimus, MMF, prednisone    Personal History of Skin Cancer:  Every been diagnosed with the following:   IF YES, (if known) indicate type, body location, year " diagnosed, and treatment.    - Pre-skin cancers (e.g. actinic keratoses): NO  - Atypical nevi: NO  - Keratinocyte carcinomas (basal cell carcinoma or squamous cell carcinoma): NO  - Melanoma: NO  - Other type(s) of skin cancer: NO     Family History of Skin Cancer:  Any first-degree relative relative(s) diagnosed with the following:  IF YES, (if known) indicate relationship, type, and age at diagnosis.     - Keratinocyte carcinomas (basal cell carcinoma or squamous cell carcinoma): YES  - Melanoma: UNKNOWN  - Other type of skin cancer: NO    Skin Cancer Review of Systems:  - Fair skin (Pineda Type I or II): YES  - Blue, green or jennifer eyes: NO  - Light or red hair: YES  - Male sex assigned at birth: YES  - History of blistering sunburns: YES  - Use of indoor tanning devices or sunlamps: No   - If YES, indoor tanning device or sunlamp?   - If YES, approximate number of lifetime sessions:   - If YES, were these used before the age of 35?:  - Smoking History: Yes - Former  - If YES, what is the pack-year smoking history? Quarter pack year history (2 packs per week for one year)  - Ever lived in a sub-tropical region? Yes - Former  - Any history of arsenic exposure (e.g. well water)? No  - Any history of viral warts? No  - Have you received the HPV vaccine? NO  - Any occupational exposure to ultraviolet light exposure (e.g. construction work, agricultural work, ): Yes - Former   - If YES, what was the profession?  18 years    Medication Review:  Is the patient currently taking any of the following medications?   IF YES, indicate which medication (if necessary).     - TNF-alpha inhibitor (e.g. etanercept, adalimumab, infliximab): NO  - Tetracycline antibiotic (e.g. minocycline, doxycycline, tetracycline): NO  - Thiazide-containing anti-hypertensive (e.g. hydrochlorothiazide): NO  - Angiotensin receptor blocker (e.g. losartan): NO  - Sulfonylurea (e.g. glipizide): NO  - Amiodarone: NO  -  Diltiazem: NO  - Voriconazole: NO     Labs Reviewed:  N/A    Physical Exam:  Vitals: There were no vitals taken for this visit.  SKIN: Full skin, which includes the head/face, both arms, chest, back, abdomen,both legs, genitalia and/or groin buttocks, digits and/or nails, was examined.  - There is a tan to brown waxy stuck on papule with surrounding erythema on the L posterior lateral neck.  - There is a firm tan/flesh colored papule that dimples with lateral pressure on the R calf.   - There are dome shaped bright red papules on the trunk and extremities.   - Multiple regular brown pigmented macules and papules are identified on the trunk and extremities.   - Scattered brown macules on sun exposed areas.  - There are waxy stuck on tan to brown papules on the trunk and extremities.  - No other lesions of concern on areas examined.     Medications:  Current Outpatient Medications   Medication     alcohol swab prep pads     alpha-lipoic acid 600 MG capsule     amLODIPine (NORVASC) 10 MG tablet     blood glucose (NO BRAND SPECIFIED) lancets standard     blood glucose (NO BRAND SPECIFIED) test strip     blood glucose (NO BRAND SPECIFIED) test strip     blood glucose calibration (NO BRAND SPECIFIED) solution     blood glucose monitoring (NO BRAND SPECIFIED) meter device kit     Blood Glucose Monitoring Suppl (CONTOUR BLOOD GLUCOSE SYSTEM) w/Device KIT     cholecalciferol (VITAMIN D3) 1000 UNIT tablet     CIALIS 5 MG tablet     Continuous Blood Gluc Sensor (DEXCOM G6 SENSOR) MISC     Continuous Blood Gluc Transmit (DEXCOM G6 TRANSMITTER) MISC     cyclobenzaprine (FLEXERIL) 10 MG tablet     fluticasone (FLONASE) 50 MCG/ACT nasal spray     furosemide (LASIX) 40 MG tablet     gabapentin (NEURONTIN) 300 MG capsule     Glucose Blood (BLOOD GLUCOSE TEST STRIPS) STRP     Insulin Disposable Pump (OMNIPOD DASH 5 PACK PODS) MISC     Insulin Disposable Pump (OMNIPOD DASH 5 PACK PODS) MISC     insulin glargine (LANTUS SOLOSTAR) 100  UNIT/ML pen     Insulin Lispro (HUMALOG KWIKPEN) 200 UNIT/ML soln     Insulin Lispro (HUMALOG KWIKPEN) 200 UNIT/ML soln     insulin pen needle (BD ENE U/F) 32G X 4 MM miscellaneous     lidocaine (LIDODERM) 5 % patch     metoprolol succinate ER (TOPROL-XL) 200 MG 24 hr tablet     multivitamin, therapeutic with minerals (MULTI-VITAMIN) TABS tablet     mycophenolic acid (GENERIC EQUIVALENT) 360 MG EC tablet     nicotine (NICODERM CQ) 14 MG/24HR 24 hr patch     oxyCODONE (ROXICODONE) 5 MG tablet     patiromer (VELTASSA) 8.4 g packet     predniSONE (DELTASONE) 2.5 MG tablet     sildenafil (REVATIO) 20 MG tablet     tacrolimus (GENERIC EQUIVALENT) 1 MG capsule     thin (NO BRAND SPECIFIED) lancets     ursodiol (ACTIGALL) 500 MG tablet     varenicline (CHANTIX KEVEN) 0.5 MG X 11 & 1 MG X 42 tablet     No current facility-administered medications for this visit.      Past Medical History:   Patient Active Problem List   Diagnosis     Carpal tunnel syndrome     Testicular hypofunction     Fibromyalgia     Sicca syndrome (H)     Medication refill- do not delete      Hepatocellular carcinoma (H)     Osteoarthritis     Rheumatoid arthritis (H)     Hyperkalemia     Liver transplant recipient (H)     Immunosuppressed status (H)     Neutropenic colitis (H)     S/P liver transplant (H)     Pancytopenia (H)     Ischemia of both lower extremities     Elevated serum creatinine     History of creation of ostomy (H)     Peristomal skin complication     Painful periwound skin     Encounter for attention to ileostomy (H)     Ileostomy in place (H)     Abscess, intra-abdominal, postoperative     Liver transplant rejection (H)     CMV colitis (H)     Type 2 diabetes mellitus with diabetic polyneuropathy, with long-term current use of insulin (H)     Diarrhea of infectious origin (Plesiomonas shigelloides (formerly known Aeromonas shigelloides) in 3/14/2019 sample)     Hypertension secondary to other renal disorders     Chronic kidney  disease, stage 3 (H)     Morbid obesity (H)     Past Medical History:   Diagnosis Date     Depressive disorder, not elsewhere classified      Diabetes type 2, controlled (H) 11/10/2016     Esophageal reflux      Fibromyalgia 1/2009    dx with Dr Benitez( Rheum)     Gangrene of finger (H) 8/25/2017     H/O deep venous thrombosis 11/2001    Permanent IVC filter in place.     H/O CASTAÑEDA (nonalcoholic steatohepatitis)      H/O Pneumonia, organism unspecified(486) 10/2001    Included ARDS, sepsis, and  acute renal failure; hospitalized, required tracheostomy placement.     H/O: HTN (hypertension) 11/2001    No longer prescribed antihypertensive medication.       History of CVA (cerebrovascular accident)      History of hepatocellular carcinoma      History of liver transplant (H)      History of obstructive sleep apnea     No longer wears CPAP since losing approximately 200 pounds with his liver transplant and its complications.       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     Osteoarthritis      Presence of PERMANENT IVC filter      Rheumatoid arthritis(714.0)

## 2022-02-09 NOTE — NURSING NOTE
"Dermatology Rooming Note    Camacho Bhagat's goals for this visit include:   Chief Complaint   Patient presents with     Skin Check     Camacho is here today for a skin check. He states \" one spot that I want checked today\"     LUCIANA Nelson  "

## 2022-02-09 NOTE — PATIENT INSTRUCTIONS
Preventive Care:    Diabetic Eye Exam Screening: During our visit today, we discussed that it is recommended you receive diabetic eye exam screening. Please call or make an appointment with your primary care provider to discuss this with them. You may also call the [facility] scheduling line ([phone]) to set up an eye exam at one of the [facility].

## 2022-02-09 NOTE — LETTER
2/9/2022       RE: Camacho Bhagat  [address] [facility] [address]     Dear Colleague,    Thank you for referring your patient, Camacho Bhagat, to the [facility] at [facility]. Please see a copy of my visit note below.    [facility] Dermatology  High Risk Non-Melanoma Skin Cancer Clinic Initial Consult Note  Encounter Date: Feb 9, 2022  Office Visit     Dermatology Problem List:  1. History of liver transplant 3/4/17  -tacrolimus, MMF, prednisone  SH: Works as a CMA.   ____________________________________________    Assessment & Plan:     # History of  Liver solid organ transplantation in the setting of CASTAÑEDA and hepatocellular carcinoma.   - We reviewed with the patient that due to the immunosuppressive medications used following transplant, solid organ transplant recipients are at a roughly 65-fold increased risk of squamous cell carcinoma, 20-fold increased risk of basal cell carcinoma, and 3.4 fold increased risk of melanoma compared to the general population.   - Based on the patient's risk factors and the Skin and Ultraviolet Neoplasia Transplant Risk Assessment Tool, the patient's risk of skin cancer following transplant is categorized as:  - High Risk: 5-Year Risk of 15.14% and 10-Year Risk of 31.75%  - We briefly reviewed prevention methods for reducing skin cancer after transplantation including avoidance of sun exposure, avoidance of indoor tanning beds or other indoor tanning devices, use of protective clothing (e.g. wide-brimmed hats, long sleeves, long pants), SPF 30 or greater sunscreen, and UV-protective sunglasses when outdoors.   - We discussed self skin examinations and partner-skin examinations.     # ISK, L lateral posterior neck  - Cryotherapy today, see procedure note below    # Benign lesions: Multiple benign nevi, solar lentigos, seborrheic keratoses, cherry angiomas. Explained to  "patient benign nature of lesion. No treatment is necessary at this time unless the lesion changes or becomes symptomatic.     - ABCDs of melanoma were discussed and self skin checks were advised.  - Sun precaution was advised including the use of sun screens of SPF 30 or higher, sun protective clothing, and avoidance of tanning beds.    Procedures Performed:   - Cryotherapy procedure note, location(s): See above. After verbal consent and discussion of risks and benefits including, but not limited to, dyspigmentation/scar, blister, and pain, 1 lesion(s) was(were) treated with 1-2 mm freeze border for 1-2 cycles with liquid nitrogen. Post cryotherapy instructions were provided.    Follow-up: 1 year(s) in-person, or earlier for new or changing lesions    Staff and Scribe:     Scribe Disclosure:  I, Comfort Allen, am serving as a scribe to document services personally performed by Jesse Gomez MD based on data collection and the provider's statements to me.     Provider Disclosure:   The documentation recorded by the scribe accurately reflects the services I personally performed and the decisions made by me.    Jesse Gomez MD    Department of Dermatology  [facility]: Phone: [phone], Fax:[phone]  [facility]: Phone: [phone] Fax: [phone]    ____________________________________________    CC: Skin Check (Camacho is here today for a skin check. He states \" one spot that I want checked today\")      HPI:  Mr. Camacho Bhagat is a(n) 56 year old male who presents today as a new patient for full body skin examination. He has a history of liver transplant in 2017 in setting of CASTAÑEDA and HCC.    The patient points to a lesion on his R calf that has been unchanged for many years. Patient is otherwise feeling well, without additional skin concerns. Now, he works as a [employer].     Type of " Organ Transplant: Liver  Reason for Transplant: CASTAÑEDA and hepatocellular carcinoma  Date of Transplant: 3/4/17  Immunosuppression Regimen: tacrolimus, MMF, prednisone    Personal History of Skin Cancer:  Every been diagnosed with the following:   IF YES, (if known) indicate type, body location, year diagnosed, and treatment.    - Pre-skin cancers (e.g. actinic keratoses): NO  - Atypical nevi: NO  - Keratinocyte carcinomas (basal cell carcinoma or squamous cell carcinoma): NO  - Melanoma: NO  - Other type(s) of skin cancer: NO     Family History of Skin Cancer:  Any first-degree relative relative(s) diagnosed with the following:  IF YES, (if known) indicate relationship, type, and age at diagnosis.     - Keratinocyte carcinomas (basal cell carcinoma or squamous cell carcinoma): YES  - Melanoma: UNKNOWN  - Other type of skin cancer: NO    Skin Cancer Review of Systems:  - Fair skin (Pineda Type I or II): YES  - Blue, green or jennifer eyes: NO  - Light or red hair: YES  - Male sex assigned at birth: YES  - History of blistering sunburns: YES  - Use of indoor tanning devices or sunlamps: No   - If YES, indoor tanning device or sunlamp?   - If YES, approximate number of lifetime sessions:   - If YES, were these used before the age of 35?:  - Smoking History: Yes - Former  - If YES, what is the pack-year smoking history? Quarter pack year history (2 packs per week for one year)  - Ever lived in a sub-tropical region? Yes - Former  - Any history of arsenic exposure (e.g. well water)? No  - Any history of viral warts? No  - Have you received the HPV vaccine? NO  - Any occupational exposure to ultraviolet light exposure (e.g. construction work, agricultural work, ): Yes - Former   - If YES, what was the profession?  18 years    Medication Review:  Is the patient currently taking any of the following medications?   IF YES, indicate which medication (if necessary).     - TNF-alpha inhibitor (e.g.  etanercept, adalimumab, infliximab): NO  - Tetracycline antibiotic (e.g. minocycline, doxycycline, tetracycline): NO  - Thiazide-containing anti-hypertensive (e.g. hydrochlorothiazide): NO  - Angiotensin receptor blocker (e.g. losartan): NO  - Sulfonylurea (e.g. glipizide): NO  - Amiodarone: NO  - Diltiazem: NO  - Voriconazole: NO     Labs Reviewed:  N/A    Physical Exam:  Vitals: There were no vitals taken for this visit.  SKIN: Full skin, which includes the head/face, both arms, chest, back, abdomen,both legs, genitalia and/or groin buttocks, digits and/or nails, was examined.  - There is a tan to brown waxy stuck on papule with surrounding erythema on the L posterior lateral neck.  - There is a firm tan/flesh colored papule that dimples with lateral pressure on the R calf.   - There are dome shaped bright red papules on the trunk and extremities.   - Multiple regular brown pigmented macules and papules are identified on the trunk and extremities.   - Scattered brown macules on sun exposed areas.  - There are waxy stuck on tan to brown papules on the trunk and extremities.  - No other lesions of concern on areas examined.     Medications:  Current Outpatient Medications   Medication     alcohol swab prep pads     alpha-lipoic acid 600 MG capsule     amLODIPine (NORVASC) 10 MG tablet     blood glucose (NO BRAND SPECIFIED) lancets standard     blood glucose (NO BRAND SPECIFIED) test strip     blood glucose (NO BRAND SPECIFIED) test strip     blood glucose calibration (NO BRAND SPECIFIED) solution     blood glucose monitoring (NO BRAND SPECIFIED) meter device kit     Blood Glucose Monitoring Suppl (CONTOUR BLOOD GLUCOSE SYSTEM) w/Device KIT     cholecalciferol (VITAMIN D3) 1000 UNIT tablet     CIALIS 5 MG tablet     Continuous Blood Gluc Sensor (DEXCOM G6 SENSOR) MISC     Continuous Blood Gluc Transmit (DEXCOM G6 TRANSMITTER) MISC     cyclobenzaprine (FLEXERIL) 10 MG tablet     fluticasone (FLONASE) 50 MCG/ACT nasal  spray     furosemide (LASIX) 40 MG tablet     gabapentin (NEURONTIN) 300 MG capsule     Glucose Blood (BLOOD GLUCOSE TEST STRIPS) STRP     Insulin Disposable Pump (OMNIPOD DASH 5 PACK PODS) MISC     Insulin Disposable Pump (OMNIPOD DASH 5 PACK PODS) MISC     insulin glargine (LANTUS SOLOSTAR) 100 UNIT/ML pen     Insulin Lispro (HUMALOG KWIKPEN) 200 UNIT/ML soln     Insulin Lispro (HUMALOG KWIKPEN) 200 UNIT/ML soln     insulin pen needle (BD ENE U/F) 32G X 4 MM miscellaneous     lidocaine (LIDODERM) 5 % patch     metoprolol succinate ER (TOPROL-XL) 200 MG 24 hr tablet     multivitamin, therapeutic with minerals (MULTI-VITAMIN) TABS tablet     mycophenolic acid (GENERIC EQUIVALENT) 360 MG EC tablet     nicotine (NICODERM CQ) 14 MG/24HR 24 hr patch     oxyCODONE (ROXICODONE) 5 MG tablet     patiromer (VELTASSA) 8.4 g packet     predniSONE (DELTASONE) 2.5 MG tablet     sildenafil (REVATIO) 20 MG tablet     tacrolimus (GENERIC EQUIVALENT) 1 MG capsule     thin (NO BRAND SPECIFIED) lancets     ursodiol (ACTIGALL) 500 MG tablet     varenicline (CHANTIX KEVEN) 0.5 MG X 11 & 1 MG X 42 tablet     No current facility-administered medications for this visit.      Past Medical History:   Patient Active Problem List   Diagnosis     Carpal tunnel syndrome     Testicular hypofunction     Fibromyalgia     Sicca syndrome (H)     Medication refill- do not delete      Hepatocellular carcinoma (H)     Osteoarthritis     Rheumatoid arthritis (H)     Hyperkalemia     Liver transplant recipient (H)     Immunosuppressed status (H)     Neutropenic colitis (H)     S/P liver transplant (H)     Pancytopenia (H)     Ischemia of both lower extremities     Elevated serum creatinine     History of creation of ostomy (H)     Peristomal skin complication     Painful periwound skin     Encounter for attention to ileostomy (H)     Ileostomy in place (H)     Abscess, intra-abdominal, postoperative     Liver transplant rejection (H)     CMV colitis (H)      Type 2 diabetes mellitus with diabetic polyneuropathy, with long-term current use of insulin (H)     Diarrhea of infectious origin (Plesiomonas shigelloides (formerly known Aeromonas shigelloides) in 3/14/2019 sample)     Hypertension secondary to other renal disorders     Chronic kidney disease, stage 3 (H)     Morbid obesity (H)     Past Medical History:   Diagnosis Date     Depressive disorder, not elsewhere classified      Diabetes type 2, controlled (H) 11/10/2016     Esophageal reflux      Fibromyalgia 1/2009    dx with Dr Benitez( Rheum)     Gangrene of finger (H) 8/25/2017     H/O deep venous thrombosis 11/2001    Permanent IVC filter in place.     H/O CASTAÑEDA (nonalcoholic steatohepatitis)      H/O Pneumonia, organism unspecified(486) 10/2001    Included ARDS, sepsis, and  acute renal failure; hospitalized, required tracheostomy placement.     H/O: HTN (hypertension) 11/2001    No longer prescribed antihypertensive medication.       History of CVA (cerebrovascular accident)      History of hepatocellular carcinoma      History of liver transplant (H)      History of obstructive sleep apnea     No longer wears CPAP since losing approximately 200 pounds with his liver transplant and its complications.       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     Osteoarthritis      Presence of PERMANENT IVC filter      Rheumatoid arthritis(714.0)

## 2022-02-17 PROBLEM — L98.8 OTHER SPECIFIED DISORDERS OF THE SKIN AND SUBCUTANEOUS TISSUE: Status: ACTIVE | Noted: 2017-11-16

## 2022-02-18 ENCOUNTER — TELEPHONE (OUTPATIENT)
Dept: TRANSPLANT | Facility: CLINIC | Age: 57
End: 2022-02-18
Payer: COMMERCIAL

## 2022-02-18 NOTE — TELEPHONE ENCOUNTER
Patient Call: General  Route to LPN    Reason for call: Patient called to see if he should be getting his 2nd COVID booster. His mychart said it was due on 02/25 and now not showing anymore.     Call back needed? Yes  Return Call Needed  Same as documented in contacts section

## 2022-02-19 ENCOUNTER — HEALTH MAINTENANCE LETTER (OUTPATIENT)
Age: 57
End: 2022-02-19

## 2022-02-22 ENCOUNTER — IMMUNIZATION (OUTPATIENT)
Dept: NURSING | Facility: CLINIC | Age: 57
End: 2022-02-22
Payer: COMMERCIAL

## 2022-02-22 PROCEDURE — 91305 COVID-19,PF,PFIZER (12+ YRS): CPT

## 2022-02-22 PROCEDURE — 0054A COVID-19,PF,PFIZER (12+ YRS): CPT

## 2022-02-28 ENCOUNTER — OFFICE VISIT (OUTPATIENT)
Dept: INTERNAL MEDICINE | Facility: CLINIC | Age: 57
End: 2022-02-28
Payer: COMMERCIAL

## 2022-02-28 ENCOUNTER — TELEPHONE (OUTPATIENT)
Dept: SLEEP MEDICINE | Facility: CLINIC | Age: 57
End: 2022-02-28

## 2022-02-28 VITALS
RESPIRATION RATE: 16 BRPM | HEART RATE: 75 BPM | HEIGHT: 72 IN | DIASTOLIC BLOOD PRESSURE: 99 MMHG | SYSTOLIC BLOOD PRESSURE: 168 MMHG | OXYGEN SATURATION: 98 % | BODY MASS INDEX: 34.54 KG/M2 | WEIGHT: 255 LBS

## 2022-02-28 DIAGNOSIS — G47.33 OSA (OBSTRUCTIVE SLEEP APNEA): Primary | ICD-10-CM

## 2022-02-28 DIAGNOSIS — S39.011A STRAIN OF RECTUS ABDOMINIS MUSCLE, INITIAL ENCOUNTER: ICD-10-CM

## 2022-02-28 PROCEDURE — 99213 OFFICE O/P EST LOW 20 MIN: CPT | Performed by: INTERNAL MEDICINE

## 2022-02-28 ASSESSMENT — PAIN SCALES - GENERAL: PAINLEVEL: SEVERE PAIN (6)

## 2022-02-28 NOTE — PATIENT INSTRUCTIONS
Thank you for visiting the [facility]! The following is some information about our clinic:     Primary Care Center Frequently-Asked Questions    (1) How do I schedule appointments at the [facility]?     Primary Care--to schedule or make changes to an existing appointment, please call our primary care line at [phone].    Labs--to schedule a lab appointment at the [facility] you can use [employer] or call [phone]. If you have a [place] location that is closer to home, you can reach out to that location for scheduling options.     Imaging--if you need to schedule a CT, X-ray, MRI, ultrasound, or other imaging study you can call [phone] to schedule at the [facility] or any other [facility] imaging location.     Referrals--if a referral to another specialty was ordered you can expect a phone call from their scheduling team. If you have not heard from them in a week, please call us or send us a [employer] message to check the status or get a scheduling number. Please note that this only applies to internal [facility] referrals. If the referral is external you would need to contact their office for scheduling.     (2) I have a question about my visit, who do I contact?     You can call us at the primary care line at [phone] to ask questions about your visit. You can also send a secure message through [employer], which is reviewed by clinic staff. Please note that [employer] messages have a twenty-four to forty-eight business hour turnaround time and should not be used for urgent concerns.    (3) How will I get the results of my tests?    If you are signed up for [employer] all tests will be released to you within twenty-four hours of resulting. Please allow three to five days for your doctor to review your results and place a note interpreting the results. If you do not have [employer] you will receive your  results through mail seven to ten business days following the return of the tests. Please note that if there should be any urgent or concerning results that your doctor or their registered nurse will reach out to you the same day as the tests come back. If you have follow up questions about your results or would like to discuss the results in detail please schedule a follow up with your provider either in person or virtually.     (4) How do I get refills of my prescriptions?     You should always first contact your pharmacy for refills of your medications. If submitting a refill request on [employer], please be sure to submit the request only once--repeat requests can cause delays in refill. If you are requesting a NEW medication or a medication related to new symptoms you will need to schedule an appointment with a provider prior to approval. Please note: Routine medication refills have up to one to three business day turnaround whereas controlled substances refills have up to five to seven business day turnaround.    (5) I have new symptoms, what do I do?     If you are having an immediate medical emergency, you should dial 911 for assistance.   For anything urgent that needs to be seen within a few hours to one day you should visit a local urgent care for assistance.  For non-urgent symptoms that need to be seen within a few days to a week you can schedule with an available provider in primary care by going to [employer] or calling [phone].   If you are not sure how serious your symptoms are or you would like to receive medical advice you can always call [phone] to speak with a triage nurse.

## 2022-02-28 NOTE — PROGRESS NOTES
ASSESSMENT/PLAN:  1.  Abdominal Muscle Spasm   Cramping or clenching pain on standing from seated position likely a spasm of rectus muscle. Able to palpate tensed muscle on right side today. Abdominal muscles are likely compensating for back pain and weakness, but  are not strong enough. May be due to under use and weakness or scar tissue in muscles associated with history of abdominal surgeries. Discussed that strengthening core muscles will help muscles to support him as he stands and allow muscles to contract without spasm. Offered PT referral. He elected to try core strengthening muscles at home first.     2. RONNIE (obstructive sleep apnea)  History of RONNIE with worsening symptoms. Not feeling refreshed on waking and has begun to fall asleep during the day. Referral placed for sleep study. He will also meet with liver/kidney dietitian to work on diet and weight loss.   - Sleep Study Referral      Number and complexity of problems:  1 stable chronic illnesses  1 undiagnosed new problem    Amount and Complexity of Data Reviewed/Analyzed:    Risk of complication/morbidity of patient management:    Patient seen and discussed with attending physician Dr. Kaya Garcia   MS3, [facility]      I, Shay Kirkpatrick, was present with the medical student who participated in the service and in the documentation of the note.  I have verified the history and personally performed the physical exam and medical decision making.  I agree with the assessment and plan of care as documented in the note.    Shay Kirkpatrick MD, FACP    Chief complaint:  Camacho Bhagat is a 56 year old male presents for   Chief Complaint   Patient presents with     RECHECK     Follow up on abdominal muscle spasms        SUBJECTIVE:  Camacho reports 6+ months of abdominal muscle spasms. Initially happened a few times per month and has gradually increased in frequency, now happening 7-10 times per week. Pain has also gotten a little bit more  intense. Describes it as clenching of muscles on both sides of umbilicus, right more often than left. Can be triggered by getting out of a chair, but also does happen while he is laying in bed or sitting still in a chair. When he does have a spasm, he can relieve it by laying down and stretching his arms over his head. No bowel changes.   Had one episode 4 days prior to appointment of more intense pain in LLQ / left groin. Rated it as 10/10. Lasted 30 -60 seconds and was relieved by same stretching. Has never had a hernia before.     Also reports worsening sleep apnea. He states that he feels less refreshed on waking in the morning, and has been falling asleep during the day. Was told that he had some episodes of apnea during sleep. Previously had CPAP and/or BiPAP. Then lost weight, and symptoms resolved. With weight gain, symptoms have now worsened. Would like to see sleep medicine provider. Is planning to meet with liver dietician to make a meal plan to help with weight loss and that fits with his dietary restrictions.     Medications and allergies were reviewed by me today.       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   Plan:  Increase tacrolimus dose, currently on low dose tacrolimus with level < 3.0.  Plan to increase for goal = 8.    Also on myfortic 720mg Q  12 hours.   Prednisone 2.5mg q day.           Abscess, intra-abdominal, postoperative 02/13/2018     Priority: Medium     Ileostomy in place (H) 01/05/2018     Priority: Medium     Peristomal skin complication 11/16/2017     Priority: Medium     Replacing diagnoses that were inactivated after the 10/1/2021 regulatory import.       Painful periwound skin 11/16/2017     Priority: Medium     Encounter for attention to ileostomy (H) 11/16/2017     Priority: Medium     History of creation of ostomy (H) 10/30/2017     Priority: Medium     Elevated serum creatinine 10/16/2017     Priority: Medium     Pancytopenia (H) 08/25/2017     Priority: Medium     Ischemia of both lower extremities 08/25/2017     Priority: Medium     Distal ischemia due to shock/high pressor requirements       S/P liver transplant (H) 07/26/2017     Priority: Medium     Neutropenic colitis (H) 07/04/2017     Priority: Medium     Immunosuppressed status (H) 03/10/2017     Priority: Medium     Liver transplant recipient (H) 03/04/2017     Priority: Medium     Hyperkalemia 02/14/2017     Priority: Medium     Hepatocellular carcinoma (H) 01/27/2016     Priority: Medium     Osteoarthritis 01/18/2015     Priority: Medium     Rheumatoid arthritis (H) 01/18/2015     Priority: Medium     Medication refill- do not delete  11/13/2013     Priority: Medium     Fibromyalgia 08/15/2011     Priority: Medium     Sicca syndrome (H) 08/15/2011     Priority: Medium     Testicular hypofunction      Priority: Medium     Problem list name updated by automated process. Provider to review       Carpal tunnel syndrome 07/08/2002     Priority: Medium       PHYSICAL EXAM:  BP (!) 168/99 (BP Location: Right arm, Patient Position: Sitting, Cuff Size: Adult Regular)   Pulse 75   Resp 16   Ht 1.829 m (6')   Wt 115.7 kg (255 lb)   SpO2 98%   BMI 34.58 kg/m    Constitutional: no distress, comfortable, pleasant   Eyes: anicteric, normal extra-ocular movements   Cardiovascular:  regular rate and rhythm, normal S1 and S2, no murmurs, rubs or gallops, peripheral pulses full and symmetric   Respiratory: clear to auscultation, no wheezes or crackles, normal breath sounds   Gastrointestinal: positive bowel sounds, nontender, no hepatosplenomegaly, no masses. Multiple well healed scars on abdomen. Palpable, tensed muscle in area of right rectus abdominus muscle.    Psychological: appropriate mood

## 2022-02-28 NOTE — NURSING NOTE
Camacho Bhagat is a 56 year old male patient that presents today in clinic for the following:    Chief Complaint   Patient presents with     RECHECK     Follow up on abdominal muscle spasms     The patient's allergies and medications were reviewed as noted. A set of vitals were recorded as noted without incident. The patient does not have any other questions for the provider.    Eli Olmstead, EMT at 9:30 AM on 2/28/2022

## 2022-02-28 NOTE — TELEPHONE ENCOUNTER
Reason for call:  Other   Patient called regarding (reason for call): call back  Additional comments: pls call pt back per request ty     Phone number to reach patient:  Home number on file [phone] (home)    Best Time:  any    Can we leave a detailed message on this number?  YES    Travel screening: Not Applicable

## 2022-03-01 ASSESSMENT — SLEEP AND FATIGUE QUESTIONNAIRES
HOW LIKELY ARE YOU TO NOD OFF OR FALL ASLEEP WHILE SITTING QUIETLY AFTER LUNCH WITHOUT ALCOHOL: WOULD NEVER DOZE
HOW LIKELY ARE YOU TO NOD OFF OR FALL ASLEEP WHILE SITTING AND TALKING TO SOMEONE: WOULD NEVER DOZE
HOW LIKELY ARE YOU TO NOD OFF OR FALL ASLEEP WHILE SITTING AND READING: HIGH CHANCE OF DOZING
HOW LIKELY ARE YOU TO NOD OFF OR FALL ASLEEP WHILE LYING DOWN TO REST IN THE AFTERNOON WHEN CIRCUMSTANCES PERMIT: WOULD NEVER DOZE
HOW LIKELY ARE YOU TO NOD OFF OR FALL ASLEEP IN A CAR, WHILE STOPPED FOR A FEW MINUTES IN TRAFFIC: WOULD NEVER DOZE
HOW LIKELY ARE YOU TO NOD OFF OR FALL ASLEEP WHEN YOU ARE A PASSENGER IN A CAR FOR AN HOUR WITHOUT A BREAK: WOULD NEVER DOZE
HOW LIKELY ARE YOU TO NOD OFF OR FALL ASLEEP WHILE WATCHING TV: MODERATE CHANCE OF DOZING
HOW LIKELY ARE YOU TO NOD OFF OR FALL ASLEEP WHILE SITTING INACTIVE IN A PUBLIC PLACE: WOULD NEVER DOZE

## 2022-03-01 NOTE — TELEPHONE ENCOUNTER
LVMTCB  Letting pt know we are scheduling consultations in May.    Jack Gallegos, Visit facilitator

## 2022-03-03 ENCOUNTER — LAB (OUTPATIENT)
Dept: LAB | Facility: CLINIC | Age: 57
End: 2022-03-03
Payer: COMMERCIAL

## 2022-03-03 DIAGNOSIS — Z13.220 LIPID SCREENING: ICD-10-CM

## 2022-03-03 DIAGNOSIS — E11.21 CONTROLLED TYPE 2 DIABETES MELLITUS WITH DIABETIC NEPHROPATHY, UNSPECIFIED WHETHER LONG TERM INSULIN USE (H): ICD-10-CM

## 2022-03-03 DIAGNOSIS — N18.31 STAGE 3A CHRONIC KIDNEY DISEASE (H): ICD-10-CM

## 2022-03-03 DIAGNOSIS — Z94.4 LIVER REPLACED BY TRANSPLANT (H): ICD-10-CM

## 2022-03-03 LAB
ALBUMIN SERPL-MCNC: 3.5 G/DL (ref 3.4–5)
ALP SERPL-CCNC: 215 U/L (ref 40–150)
ALT SERPL W P-5'-P-CCNC: 34 U/L (ref 0–70)
ANION GAP SERPL CALCULATED.3IONS-SCNC: 6 MMOL/L (ref 3–14)
AST SERPL W P-5'-P-CCNC: 22 U/L (ref 0–45)
BILIRUB DIRECT SERPL-MCNC: 0.2 MG/DL (ref 0–0.2)
BILIRUB SERPL-MCNC: 0.7 MG/DL (ref 0.2–1.3)
BUN SERPL-MCNC: 48 MG/DL (ref 7–30)
CALCIUM SERPL-MCNC: 8.9 MG/DL (ref 8.5–10.1)
CHLORIDE BLD-SCNC: 108 MMOL/L (ref 94–109)
CO2 SERPL-SCNC: 24 MMOL/L (ref 20–32)
CREAT SERPL-MCNC: 1.66 MG/DL (ref 0.66–1.25)
CREAT UR-MCNC: 110 MG/DL
ERYTHROCYTE [DISTWIDTH] IN BLOOD BY AUTOMATED COUNT: 13.4 % (ref 10–15)
GFR SERPL CREATININE-BSD FRML MDRD: 48 ML/MIN/1.73M2
GLUCOSE BLD-MCNC: 126 MG/DL (ref 70–99)
HBA1C MFR BLD: 5.2 % (ref 0–5.6)
HCT VFR BLD AUTO: 39.7 % (ref 40–53)
HGB BLD-MCNC: 13.3 G/DL (ref 13.3–17.7)
MCH RBC QN AUTO: 30.3 PG (ref 26.5–33)
MCHC RBC AUTO-ENTMCNC: 33.5 G/DL (ref 31.5–36.5)
MCV RBC AUTO: 90 FL (ref 78–100)
PLATELET # BLD AUTO: 135 10E3/UL (ref 150–450)
POTASSIUM BLD-SCNC: 4.4 MMOL/L (ref 3.4–5.3)
PROT SERPL-MCNC: 7 G/DL (ref 6.8–8.8)
PROT UR-MCNC: 1.74 G/L
PROT/CREAT 24H UR: 1.58 G/G CR (ref 0–0.2)
RBC # BLD AUTO: 4.39 10E6/UL (ref 4.4–5.9)
SODIUM SERPL-SCNC: 138 MMOL/L (ref 133–144)
TACROLIMUS BLD-MCNC: 5.7 UG/L (ref 5–15)
TME LAST DOSE: NORMAL H
TME LAST DOSE: NORMAL H
WBC # BLD AUTO: 8.3 10E3/UL (ref 4–11)

## 2022-03-03 PROCEDURE — 82248 BILIRUBIN DIRECT: CPT

## 2022-03-03 PROCEDURE — 85027 COMPLETE CBC AUTOMATED: CPT

## 2022-03-03 PROCEDURE — 83036 HEMOGLOBIN GLYCOSYLATED A1C: CPT

## 2022-03-03 PROCEDURE — 36415 COLL VENOUS BLD VENIPUNCTURE: CPT

## 2022-03-03 PROCEDURE — 80053 COMPREHEN METABOLIC PANEL: CPT

## 2022-03-03 PROCEDURE — 84156 ASSAY OF PROTEIN URINE: CPT

## 2022-03-03 PROCEDURE — 80197 ASSAY OF TACROLIMUS: CPT

## 2022-03-07 ENCOUNTER — OFFICE VISIT (OUTPATIENT)
Dept: SLEEP MEDICINE | Facility: CLINIC | Age: 57
End: 2022-03-07
Attending: INTERNAL MEDICINE
Payer: COMMERCIAL

## 2022-03-07 VITALS
WEIGHT: 264 LBS | SYSTOLIC BLOOD PRESSURE: 170 MMHG | OXYGEN SATURATION: 99 % | DIASTOLIC BLOOD PRESSURE: 94 MMHG | BODY MASS INDEX: 35.76 KG/M2 | HEIGHT: 72 IN | HEART RATE: 86 BPM

## 2022-03-07 DIAGNOSIS — G47.33 OSA (OBSTRUCTIVE SLEEP APNEA): Primary | ICD-10-CM

## 2022-03-07 PROCEDURE — 99243 OFF/OP CNSLTJ NEW/EST LOW 30: CPT | Performed by: PSYCHIATRY & NEUROLOGY

## 2022-03-07 NOTE — NURSING NOTE
Chief Complaint   Patient presents with     Sleep Problem     Symptoms of sleep apnea, haven't used BiPAP since 2016       Initial BP (!) 170/94   Pulse 86   Ht 1.829 m (6')   Wt 119.7 kg (264 lb)   SpO2 99%   BMI 35.80 kg/m   Estimated body mass index is 35.8 kg/m  as calculated from the following:    Height as of this encounter: 1.829 m (6').    Weight as of this encounter: 119.7 kg (264 lb).    Medication Reconciliation: complete  ESS 5  Neck circumference: 46 centimeters.  Kathy Toth, CMA

## 2022-03-07 NOTE — PROGRESS NOTES
[facility]  Patient Name: Camacho Bhagat  MRN: [id number]  : 1965  Date of Clinic Visit: 2022  Primary Care Provider: Shay Kirkpatrick  Referring Provider: Shay Kirkpatrick  Visit type:In-person encounter  -------------------------------------------------------------------------------------------------------------------------------  Reason for Referral:   Severe obstructive sleep apnea  Snoring  Excessive daytime sleepiness    HISTORY OF PRESENT ILLNESS:  Camacho Bhagat is a 56 year old male with past medical history significant for status post liver transplantation in 2016 presents for an evaluation of above mentioned chief complaint.    This is a pleasant gentleman with history of liver transplantation in 2016 for hepatocellular carcinoma, rheumatoid arthritis on chronic low-dose prednisone therapy, class I obesity with BMI 35.8, CKD with EGFR 48 consistent with stage III, previously diagnosed severe in 20 obstructive sleep apnea in  at outside sleep lab in [place], history of using BiPAP.  In summary he had liver transplantation in 2016 for hepatocellular carcinoma.  He lost significant weight.  Pretransplant his weight was in low 400 pounds reportedly.  After liver transplantation and subsequent CMV colitis and subsequent hemicolectomy, his weight dropped to 160 pounds.  Currently his weight is 255 pounds.    For the past 2-3 months he has been experiencing increasing fatigue and daytime sleepiness, increasing snoring and witnessed apneic events by his current bedroom partner.  He is here to be reevaluated for sleep disordered breathing.    Sleep scores  ESS: 5 out of 24  JAONN: 18  STOP-BAN    Please refer to below Sleep Medicine pertinent Questionnaires.      A. SLEEP RELATED DISORDERED BREATHING    Snoring: confirms worsening    Snort arousals  Confirms    Witness apneas: Confirms with length his current bedroom partner    Nasal blockage: denies    Morning headache: Confirms  intermittently    Morning dry mouth: Confirms    Feels non-restorative sleep: yes    Sleep position:  Goes to sleep when he is back then turns to his sides    Recent change in weight:  5 pound gain in the past 6 months.        B. SLEEP SCHEDULE    Bedtime: Varies.  He goes to bed between 9 PM to 3 AM.  On average in a week, he goes to bed before midnight about 2 times.  In a week, he goes to bed after midnight about 5 times a week.  He reports that there is no reason that he can think of why his bedtime schedule is highly variable.  On some nights he would find himself very tired and sleepy and he goes to bed earlier.  On some nights he does not feel tired until 3 AM.  1 hour prior to falling asleep, he usually watches TV or plays with his iPad for an hour in his bed before falling asleep.  He sleeps with his dog on Gabriel sized mattress.  Some nights his bedroom partner/girlfriend comes over and he sleeps with her on the same bed.  Nocturnal awakening on average 1 time to use the bathroom.  No difficulty falling back asleep.  He sleeps with his dog on the same bed which is Irish Orellana breed.  Reports that sometimes the dog's presence on his bed disturbs him.    Wake up time is at between 6-7 AM with alarm clock on the days he goes to bed before midnight.  On nights he goes to bed after midnight, she he would wake up at 9 AM without alarm clock.    He is currently not working.    He used to work as of  and his position was promoted to supervisor.  He used to work shift schedule.  In the year, he went to work 6 months in the daytime, 6 months at night 10 PM to 8 AM.  He did this for 13 years and stopped doing it in 2001.    Naps: confirms. 15 minutes-1hr on day.   They are refreshing.    Opioid Use: confirms. Prn oxycodone       C. Parasomnias (Non-REM Parasomnias and REM Behavior    Disorder) /Hypersomnia/Sleep-related Movement Disorder    Have you ever walked in your sleep: denies    Night  terrors or screaming in your sleep: denies    Have you ever eaten food during sleep: denies    Have you ever talked in your sleep (somniloquy): denies    Do you have frequent nightmares or Disruptive dreams: denies    Dream enactment behavior: denies    Sleep paralyis:denies    Hypnagogic hallucinations:denies    Hypnopompic hallucinations:denies    fallen out of bed during sleep: denies    Hit something in bedstance: denies    Periodic Leg Movement/Restless legs symptoms: denies    Periods of Hypersomnia/Cataplexy (Sleep attack) symptoms: denies      REVIEW OF SYSTEMS:  A complete review of systems reviewed by me is negative with the exeption of what has been mentioned in the history of present illness.    PHYSICAL EXAMINATION:  BP (!) 170/94   Pulse 86   Ht 1.829 m (6')   Wt 119.7 kg (264 lb)   SpO2 99%   BMI 35.80 kg/m    Body mass index is 35.8 kg/m .  General: no distress, pleasant  HEENT: Sabillon tongue position 4 .  neck circumference  46cm  Palate: no high arch  Jaw position: no retrognathia  Airflow at nares:  clear bilaterally  Cardiac: no evidence of acute systemic circulatory compromise, no visible cyanosis  Pulmonary:  no sign of acute respiratory distress. No audible wheezing or stridor grossly  Abdomen: obese, distended  Skin: no rashes or lesions on visible skin surfaces  Neuro:   Mental status: alert; language is fluent with intact comprehension   Cranial nerves: no facial asymmetry or ptosis, hearing intact to conversation, no hypophonia, no dysarthria   Motor: no abnormal movements, anti-gravity strength in all limbs   Coordination: no ataxia   Gait: normal gait      INVESTIGATIONS:  Prior PSG: none    C02: 24    ECHO: 2017  Interpretation Summary  Global and regional left ventricular function is normal with an EF of 60-65%.  Mild right ventricular dilation is present.  Global right ventricular function is normal.  Right ventricular systolic pressure is 36mmHg above the right atrial  pressure.  No pericardial effusion is present.    REVIEW OF SYSTEMS: 12-point RoS negative except as per HPI.  -------------------------------------------------------------------------------------------------------------------------------  Allergies   Allergen Reactions     Erythromycin GI Disturbance     Losartan Other (See Comments)     Consistently develops hyperkalemia     Vioxx      Nausea, vomiting     Current Outpatient Medications   Medication Sig Dispense Refill     alcohol swab prep pads Use to swab area of injection/francisca as directed. 100 each 3     alpha-lipoic acid 600 MG capsule Take 600 mg by mouth       amLODIPine (NORVASC) 10 MG tablet Take 1 tablet (10 mg) by mouth daily 90 tablet 3     blood glucose (NO BRAND SPECIFIED) lancets standard Use to test blood sugar 1 times daily or as directed. 100 lancet 3     blood glucose (NO BRAND SPECIFIED) test strip Use to test blood sugar 1 times daily or as directed. 50 strip 11     blood glucose (NO BRAND SPECIFIED) test strip Use to test blood sugar 3 times daily or as directed. Any covered brand preferred by Pt that works with meter. 300 strip 3     blood glucose calibration (NO BRAND SPECIFIED) solution Use to calibrate meter. 1 Bottle 3     blood glucose monitoring (NO BRAND SPECIFIED) meter device kit Use to test blood sugar 3 times daily or as directed. Any covered brand preferred by Pt. 1 kit 1     Blood Glucose Monitoring Suppl (CONTOUR BLOOD GLUCOSE SYSTEM) w/Device KIT For use with the Omnipod Dash insulin pump 1 kit 1     cholecalciferol (VITAMIN D3) 1000 UNIT tablet Take 1 tablet (1,000 Units) by mouth daily (Patient taking differently: Take 1,000 Units by mouth every morning ) 100 tablet 3     CIALIS 5 MG tablet Take 5 mg by mouth as needed   1     Continuous Blood Gluc Sensor (DEXCOM G6 SENSOR) MISC Change every 10 days. 9 each 3     Continuous Blood Gluc Transmit (DEXCOM G6 TRANSMITTER) MISC Change every 3 months. 1 each 3     cyclobenzaprine  (FLEXERIL) 10 MG tablet Take 1 tablet (10 mg) by mouth 3 times daily as needed for muscle spasms 90 tablet 3     fluticasone (FLONASE) 50 MCG/ACT nasal spray Spray 1 spray into both nostrils daily 15 mL 1     furosemide (LASIX) 40 MG tablet Take 1 tablet (40 mg) by mouth 2 times daily 180 tablet 3     gabapentin (NEURONTIN) 300 MG capsule Take 2 capsules (600 mg) by mouth 3 times daily 540 capsule 3     Glucose Blood (BLOOD GLUCOSE TEST STRIPS) STRP 1 strip by Lancet route 3 times daily 300 each 3     Insulin Disposable Pump (OMNIPOD DASH 5 PACK PODS) MISC 1 each every 48 hours 45 each 3     Insulin Disposable Pump (OMNIPOD DASH 5 PACK PODS) MISC 1 each every 3 days 6 each 3     insulin glargine (LANTUS SOLOSTAR) 100 UNIT/ML pen Inject 65 Units Subcutaneous every morning 60 mL 3     Insulin Lispro (HUMALOG KWIKPEN) 200 UNIT/ML soln Up to 100 units per day per insulin pump 30 mL 3     Insulin Lispro (HUMALOG KWIKPEN) 200 UNIT/ML soln Use one unit per 3 grams carbohydrates for all meals and snacks. Total daily dose up to 100 U. 45 mL 3     insulin pen needle (BD ENE U/F) 32G X 4 MM miscellaneous Use 1 pen needle 4 times daily or as directed. 400 each 1     lidocaine (LIDODERM) 5 % patch Place 1 patch onto the skin every 24 hours To prevent lidocaine toxicity, patient should be patch free for 12 hrs daily. 30 patch 11     metoprolol succinate ER (TOPROL-XL) 200 MG 24 hr tablet Take 1 tablet (200 mg) by mouth daily 90 tablet 3     multivitamin, therapeutic with minerals (MULTI-VITAMIN) TABS tablet Take 1 tablet by mouth every morning       mycophenolic acid (GENERIC EQUIVALENT) 360 MG EC tablet TAKE 2 TABLETS (720 MG) BY MOUTH EVERY 12 HOURS 360 tablet 3     nicotine (NICODERM CQ) 14 MG/24HR 24 hr patch Place 1 patch onto the skin every 24 hours 28 patch 11     oxyCODONE (ROXICODONE) 5 MG tablet Take 1 tablet (5 mg) by mouth every 4 hours as needed for pain maximum 6 tablet(s) per day 30 tablet 0     patiromer  (VELTASSA) 8.4 g packet Take 8.4 g by mouth daily 90 each 3     predniSONE (DELTASONE) 2.5 MG tablet TAKE 1 TABLET BY MOUTH EVERY DAY 90 tablet 3     sildenafil (REVATIO) 20 MG tablet Take 2 tablets (40 mg) by mouth daily as needed (erectile dysfunction) 10 tablet 11     tacrolimus (GENERIC EQUIVALENT) 1 MG capsule Take 4 capsules (4 mg) by mouth every morning AND 3 capsules (3 mg) every evening. 630 capsule 3     thin (NO BRAND SPECIFIED) lancets Use with lanceting device. Any covered brand. 300 each 3     ursodiol (ACTIGALL) 500 MG tablet TAKE 1 TABLET BY MOUTH TWICE A  tablet 3     varenicline (CHANTIX KEVEN) 0.5 MG X 11 & 1 MG X 42 tablet Take 0.5 mg tab daily for 3 days, THEN 0.5 mg tab twice daily for 4 days, THEN 1 mg twice daily. 53 tablet 0     Past Medical History:   Diagnosis Date     Depressive disorder, not elsewhere classified      Diabetes type 2, controlled (H) 11/10/2016     Esophageal reflux      Fibromyalgia 1/2009    dx with Dr Benitez( Rheum)     Gangrene of finger (H) 8/25/2017     H/O deep venous thrombosis 11/2001    Permanent IVC filter in place.     H/O CASTAÑEDA (nonalcoholic steatohepatitis)      H/O Pneumonia, organism unspecified(486) 10/2001    Included ARDS, sepsis, and  acute renal failure; hospitalized, required tracheostomy placement.     H/O: HTN (hypertension) 11/2001    No longer prescribed antihypertensive medication.       History of CVA (cerebrovascular accident)      History of hepatocellular carcinoma      History of liver transplant (H)      History of obstructive sleep apnea     No longer wears CPAP since losing approximately 200 pounds with his liver transplant and its complications.       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     Osteoarthritis      Presence of PERMANENT IVC filter      Rheumatoid  arthritis(714.0)      Past Surgical History:   Procedure Laterality Date     BENCH LIVER N/A 3/4/2017    Procedure: BENCH LIVER;  Surgeon: Jovan Tran MD;  Location: [facility]     CHOLECYSTECTOMY       COLONOSCOPY N/A 7/21/2017    Procedure: COMBINED COLONOSCOPY, SINGLE OR MULTIPLE BIOPSY/POLYPECTOMY BY BIOPSY;  Colonoscopy;  Surgeon: Izaiah Montes MD;  Location: [facility]     ENDOSCOPIC RETROGRADE CHOLANGIOPANCREATOGRAM N/A 5/22/2018    Procedure: COMBINED ENDOSCOPIC RETROGRADE CHOLANGIOPANCREATOGRAPHY, PLACE TUBE/STENT;  Endoscopic Retrograde Cholangiopancreatography with sphincterotomy and pancreatic duct stent placement;  Surgeon: Zay Gaitan MD;  Location: [facility]     ENT SURGERY      Repair of deviated septum     ESOPHAGOSCOPY, GASTROSCOPY, DUODENOSCOPY (EGD), COMBINED N/A 8/4/2016    Procedure: COMBINED ESOPHAGOSCOPY, GASTROSCOPY, DUODENOSCOPY (EGD), BIOPSY SINGLE OR MULTIPLE;  Surgeon: Trent Pederson MD;  Location:  GI     ESOPHAGOSCOPY, GASTROSCOPY, DUODENOSCOPY (EGD), COMBINED N/A 8/27/2017    Procedure: COMBINED ESOPHAGOSCOPY, GASTROSCOPY, DUODENOSCOPY (EGD);;  Surgeon: Los Wynn MD;  Location: [facility]     INCISION AND DRAINAGE ABDOMEN WASHOUT, COMBINED Right 2/14/2018    Procedure: COMBINED INCISION AND DRAINAGE ABDOMEN WASHOUT;  COMBINED INCISION AND DRAINAGE right ABDOMEN flank;  Surgeon: Sara Dinh MD;  [facility]     LAPAROTOMY EXPLORATORY N/A 7/4/2017    Procedure: LAPAROTOMY EXPLORATORY;  Exploratory Laparotomy, washout;  Surgeon: Tip Zhang MD;  [facility]     LAPAROTOMY EXPLORATORY N/A 7/5/2017    Procedure: LAPAROTOMY EXPLORATORY;  Exploratory Laparotomy, Washout with closure.;  Surgeon: Tip Zhang MD;  [facility]     LAPAROTOMY EXPLORATORY N/A 7/26/2017    Procedure: LAPAROTOMY EXPLORATORY;  Exploratory Laparotomy, Right angelique-colectomy, end ileostomy, mucosal fistula, partial omentectomy;  Surgeon:  Sara Dinh MD;  Location: [facility]     ORTHOPEDIC SURGERY Right     Repair of dislocated wrist.       RELEASE TRIGGER FINGER Right 11/10/2017    Procedure: RELEASE TRIGGER FINGER;  Debride, V-Y Flap Right Index Finger;  Surgeon: Momo Noonan MD;  [facility]     TAKEDOWN ILEOSTOMY N/A 2018    Procedure: TAKEDOWN ILEOSTOMY;  Exploratory Laparotomy, Lysis of Adhesions, Takedown Of End Ileostomy, Takedown of mucocutaneous fistula, ileocolic resection  And Colorectal Anastomosis, Primary repair of Ventral Hernia, Anesthesia Block ;  Surgeon: Sara Dinh MD;  [facility]: [facility]     TRACHEOSTOMY      During hospitalization for pneumonia.       TRANSPLANT LIVER RECIPIENT  DONOR N/A 3/4/2017    Procedure: TRANSPLANT LIVER RECIPIENT  DONOR;  Surgeon: Jovan Tran MD;  [facility]     [facility] TOTAL KNEE ARTHROPLASTY      Right knee arthroscopy     Family History   Problem Relation Age of Onset     Diabetes Father      Hypertension Father      Substance Abuse Father      Arthritis Mother      Thyroid Cancer Mother         Survivor!     Cervical Cancer Maternal Grandmother      Cerebrovascular Disease Maternal Grandfather      No Known Problems Paternal Grandmother      Prostate Cancer Paternal Grandfather      Colon Cancer No family hx of      Hyperlipidemia No family hx of      Coronary Artery Disease No family hx of      Breast Cancer No family hx of          ASSESSMENT AND PLAN:  Camacho Bhagat is a 56 year old male with PMH significant for liver transplantation in , rheumatoid arthritis, class I obesity with BMI 35.8, CKD stage III, previously diagnosed severe obstructive sleep apnea in , repeat sleep study (HST) in  after liver transplantation and significant weight loss that revealed resolution of SDB is being evaluated for    History of severe obstructive sleep apnea  H/o BiPAP use, last use  (it was discontinued at that time  because HST revealed no sleep apnea reportedly)  Sleep disturbances  Inadequate sleep hygiene  Suspected Delayed Sleep-wake Phase Disorder  Increasing non-restorative sleep/Excessive daytime sleepiness  STOP BANG score is 8. Patient likely has sleep apnea based on clinical history of EDS, Snoring, Snort arousals, weight gain.  - in-lab sleep study split-night polysomnography will be ordered to assess for current SDB status  - diagnosis and treatment for RONNIE have been discussed. Complications of untreated RONNIE have also been discussed  - encouraged smoking cessation, if applicable  - encouraged weight loss, healthy diet, and exercise    -In terms of suspected delayed sleep-wake phase disorder, general sleep hygiene recommendation was provided including try to go to bed at same time consistently, wake up at same time consistently, limit daytime naps.    Elevated blood pressure readings  He is noted to have whitecoat hypertension.  When I and patient checked blood pressure together in the room, it was 155/87.  He is asymptomatic  Advised to check with his PCP for closer monitoring of blood pressure    Return to clinic after sleep study    The patient verbalized comprehension of care plans.    Time was given for questions and I answered best to my ability.      Patient was advised to avoid driving, operating any heavy machinery or other hazardous situations while drowsy or sleepy.  Patient was counseled on the importance of driving while alert, to pull over if drowsy, or nap before getting into the vehicle if sleepy.    The patient was evaluated and care plan was formulated with the Sleep Medicine Attending Physician on the Service for this encounter.    Toñito Jiménez M.D.    Sleep Medicine Fellow  -------------------------------------------------------------------------------------------------------------------------------    This note was written with the assistance of the Dragon voice-Roostation technology software. The  final document, although reviewed, may contain errors. For corrections, please contact the office.  -------------------------------------------------------------------------------------------------------------------------------    Sleep Staff Addendum Video    I have seen and evaluated the patient today with the sleep fellow and agree with the documentation.  I supervised the fellow during the visit today.      In addition to direct face-to-face time I also personally spent additionally time in chart work preparation and also after the visit in documentation, placing orders and coordination of care.      Total time (face-to-face time + chart work time) spent in the care of the patient today was 45 minutes.     Momo Woo MD

## 2022-04-06 ENCOUNTER — MYC MEDICAL ADVICE (OUTPATIENT)
Dept: INTERNAL MEDICINE | Facility: CLINIC | Age: 57
End: 2022-04-06
Payer: COMMERCIAL

## 2022-04-06 DIAGNOSIS — Z79.4 TYPE 2 DIABETES MELLITUS WITH DIABETIC POLYNEUROPATHY, WITH LONG-TERM CURRENT USE OF INSULIN (H): ICD-10-CM

## 2022-04-06 DIAGNOSIS — M54.16 LUMBAR RADICULOPATHY: ICD-10-CM

## 2022-04-06 DIAGNOSIS — E11.42 TYPE 2 DIABETES MELLITUS WITH DIABETIC POLYNEUROPATHY, WITH LONG-TERM CURRENT USE OF INSULIN (H): ICD-10-CM

## 2022-04-06 DIAGNOSIS — M54.50 LOW BACK PAIN AT MULTIPLE SITES: ICD-10-CM

## 2022-04-06 RX ORDER — INSULIN LISPRO 200 [IU]/ML
INJECTION, SOLUTION SUBCUTANEOUS
Qty: 80 ML | Refills: 3 | Status: SHIPPED | OUTPATIENT
Start: 2022-04-06 | End: 2022-05-02

## 2022-04-06 NOTE — TELEPHONE ENCOUNTER
Previous orders signed by Dr Kirkpatrick. 90 day supply Rx pended to Dr West.   Kenzie Stock, RN on 4/6/2022 at 3:51 PM         RE    Camacho Bhagat, Alisa Carl MD 7 minutes ago (3:41 PM)     I need a refill of my Humalog 200, the script can be sent to [facility] in [place] on [address]. My insurance give me a cheaper price with a 90 day supply. If you have any questions I can be reached at [phone].  Thanks,  Camacho

## 2022-04-14 ENCOUNTER — TELEPHONE (OUTPATIENT)
Dept: SLEEP MEDICINE | Facility: CLINIC | Age: 57
End: 2022-04-14
Payer: COMMERCIAL

## 2022-04-14 NOTE — TELEPHONE ENCOUNTER
Reason for Call:  Other call back    Detailed comments: PT HAS SLEEP STUDY ON 4/27, NEEDS COVID TEST, HE CAN NOT DO IT IN THE 3-4 DAY TIME FRAME. HE CAN DO IT WITHIN 5 DAYS, ON 4/22. PT REQUESTS CALL BACK IF IT IS NOT OK TO DO THE TEST ON 4/22 AND TO BE GIVEN INSTRUCTIONS ON WHAT TO DO NEXT IF HE CAN'T GET THE TEST. HE CURRENTLY DOSE HAVE AN APPT THAT DAY AT [facility]  Phone Number Patient can be reached at: Home number on file [phone] (home)    Best Time: ANYTIME    Can we leave a detailed message on this number? YES    Call taken on 4/14/2022 at 2:55 PM by Kathy Gaspar

## 2022-04-15 NOTE — TELEPHONE ENCOUNTER
Pt already scheduled for lab test by , called him and he said he already has appointment that was scheduled, I let him know to call the lab and make sure it was ok as it is not up to sleep clinic. Pt refused number and state dif he had appt which was scheduled by  he didn't understand why he should call. patient call notes state pt requested call back for an ok on 4/22 covid appointment.    Jack Gallegos, Visit facilitator

## 2022-04-22 ENCOUNTER — LAB (OUTPATIENT)
Dept: LAB | Facility: CLINIC | Age: 57
End: 2022-04-22
Attending: PSYCHIATRY & NEUROLOGY
Payer: COMMERCIAL

## 2022-04-22 DIAGNOSIS — G47.33 OSA (OBSTRUCTIVE SLEEP APNEA): ICD-10-CM

## 2022-04-22 PROCEDURE — U0005 INFEC AGEN DETEC AMPLI PROBE: HCPCS | Mod: 90 | Performed by: PATHOLOGY

## 2022-04-22 PROCEDURE — 99000 SPECIMEN HANDLING OFFICE-LAB: CPT | Performed by: PATHOLOGY

## 2022-04-22 PROCEDURE — U0003 INFECTIOUS AGENT DETECTION BY NUCLEIC ACID (DNA OR RNA); SEVERE ACUTE RESPIRATORY SYNDROME CORONAVIRUS 2 (SARS-COV-2) (CORONAVIRUS DISEASE [COVID-19]), AMPLIFIED PROBE TECHNIQUE, MAKING USE OF HIGH THROUGHPUT TECHNOLOGIES AS DESCRIBED BY CMS-2020-01-R: HCPCS | Mod: 90 | Performed by: PATHOLOGY

## 2022-04-23 LAB — SARS-COV-2 RNA RESP QL NAA+PROBE: NEGATIVE

## 2022-04-27 ENCOUNTER — THERAPY VISIT (OUTPATIENT)
Dept: SLEEP MEDICINE | Facility: CLINIC | Age: 57
End: 2022-04-27
Payer: COMMERCIAL

## 2022-04-27 DIAGNOSIS — G47.33 OSA (OBSTRUCTIVE SLEEP APNEA): Primary | ICD-10-CM

## 2022-04-27 PROCEDURE — 95811 POLYSOM 6/>YRS CPAP 4/> PARM: CPT | Performed by: PSYCHIATRY & NEUROLOGY

## 2022-04-27 NOTE — PROGRESS NOTES
Jose Antonio Gu CMA    Outcome for 04/29/22 7:41 AM :Glucose sent via Email NL  Outcome for 04/27/22 9:00 AM :Patient is sharing data via clinic device website and patient has been instructed to update data on website. Find login information by using .Upverter  NL

## 2022-04-28 ASSESSMENT — ENCOUNTER SYMPTOMS
MYALGIAS: 1
RECTAL PAIN: 0
DIARRHEA: 1
NAUSEA: 0
NECK PAIN: 1
CONSTIPATION: 0
MUSCLE WEAKNESS: 0
STIFFNESS: 1
BACK PAIN: 1
VOMITING: 0
MUSCLE CRAMPS: 1
HEARTBURN: 0
JAUNDICE: 0
BLOOD IN STOOL: 0
BLOATING: 0
JOINT SWELLING: 1
ARTHRALGIAS: 1
BOWEL INCONTINENCE: 0
ABDOMINAL PAIN: 0

## 2022-04-28 NOTE — PROCEDURES
I have put in an order to start CPAP.  I called to discuss with patient but did not get through.      We keep originally scheduled follow up.

## 2022-04-28 NOTE — PROCEDURES
SLEEP STUDY INTERPRETATION  SPLIT NIGHT STUDY      Patient: RONNIE ARIZMENDI  YOB: 1965  Study Date: 4/27/2022  MRN: [id number]  Referring Provider: MD Kirkpatrick Brian  Ordering Provider: MD Jiménez John    Indications for Polysomnography: The patient is a 56 year old Male who is 6' and weighs 264.0 lbs. His BMI is 36.2. Relevant medical history includes snoring, witnessed apneas. A diagnostic polysomnogram was performed to evaluate for sleep apnea/PLMS/RLS/RBD/S-S/CSA/hypoventilation/hypoxemia. After 125.5 minutes of sleep time the patient exhibited sufficient respiratory events qualifying him for a CPAP trial which was then initiated.    Polysomnogram Data: A full night polysomnogram recorded the standard physiologic parameters including EEG, EOG, EMG, ECG, nasal and oral airflow. Respiratory parameters of chest and abdominal movements were recorded with respiratory inductance plethysmography. Oxygen saturation was recorded by pulse oximetry.  Hypopnea scoring rule used: 1B 4%    Diagnostic PSG  Sleep Architecture: Sleep fragmentation  The total recording time of the polysomnogram was 140.2 minutes. The total sleep time was 125.5 minutes. Sleep latency was 0.2 minutes. REM latency was 123.0 minutes. Arousal index was 36.8 arousals per hour. Sleep efficiency was 89.5%. Wake after sleep onset was 14.5 minutes. The patient spent 21.9% of total sleep time in Stage N1, 73.7% in Stage N2, 0.0% in Stage N3, and 4.4% in REM. Time in REM supine was 5.5 minutes.    Respiration: Severe RONNIE with hypoxemia    Events ? The polysomnogram revealed a presence of 70 obstructive, - central, and 4 mixed apneas resulting in an apnea index of 35.4 events per hour. There were 93 obstructive hypopneas and - central hypopneas resulting in an obstructive hypopnea index of 44.5 and central hypopnea index of - events per hour. The combined apnea/hypopnea index was 79.8 events per hour (central apnea/hypopnea index was - events per  hour).  The REM AHI was 65.5 events per hour. The supine AHI was 82.8 events per hour. The RERA index was 0.5 events per hour. The RDI was 80.3 events per hour.    Snoring - was reported as loud.    Respiratory rate and pattern - was notable for normal respiratory rate and pattern.    Sustained Sleep Associated Hypoventilation - Transcutaneous carbon dioxide monitoring was not used.    Sleep Associated Hypoxemia - (Greater than 5 minutes O2 sat at or below 88%) was present. Baseline oxygen saturation was 90.4%. Lowest oxygen saturation was 67.0%. Time spent less than or equal to 88% was 40.4 minutes. Time spent less than or equal to 89% was 48.2 minutes.     Treatment PSG  Sleep Architecture: Decreased sleep fragmentation  At 12:56:44 AM the patient was placed on PAP treatment and was titrated at pressures ranging from CPAP 5 cmH2O up to CPAP 14 cmH2O. The total recording time of the treatment portion of the study was 352.8 minutes. The total sleep time was 337.0 minutes. During the treatment portion of the study the sleep latency was 2.5 minutes. REM latency was 43.5 minutes. Arousal index was 8.0 arousals per hour. Sleep efficiency was 95.5%. Wake after sleep onset was 13.0 minutes. The patient spent 6.5% of total sleep time in Stage N1, 32.9% in Stage N2, 17.8% in Stage N3, and 42.7% in REM. Time in REM supine was 144.0 minutes.     Respiration: Sleep disordered breathing noted on the baseline portion of the study was nearly fully resolved with positive airway pressure therapy.    The final pressure was CPAP 14 cmH2O with an AHI of 0.5 events per hour. Time in REM supine on final pressure was 68.0 minutes.     Movement Activity:     Periodic Limb Movements  o During the study, there were 0 PLMs recorded.    REM EMG Activity - Excessive muscle activity was not present.    Nocturnal Behavior - Abnormal sleep related behaviors were not noted.    Bruxism - None apparent.    Cardiac Summary: Sinus  During the  diagnostic portion of the study, the average pulse rate was 60.5 bpm. The minimum pulse rate was 52.0 bpm while the maximum pulse rate was 76.0 bpm.    During the treatment portion of the study, the average pulse rate was 53.3 bpm. The minimum pulse rate was 47.0 bpm while the maximum pulse rate was 72.0 bpm.     Assessment:     Severe RONNIE with hypoxemia     Sleep disordered breathing noted on the baseline portion of the study was nearly fully resolved with positive airway pressure therapy.    Recommendations:    Treatment of RONNIE with Auto?titrating PAP therapy. Recommend clinical follow up with sleep management team, including review of compliance measures.    Advice regarding the risks of drowsy driving.    Suggest optimizing sleep schedule and avoiding sleep deprivation.    Weight management     Diagnostic Codes: G47.33, G47.36      Momo Woo MD 4-28-22  Diplomate, [facility] Sleep Medicine

## 2022-04-29 NOTE — PATIENT INSTRUCTIONS
Completed a split night PSG per provider order.    Preliminary AHI >30.  A final therapeutic PAP pressure was achieved.    Supine REM was seen on therapeutic pressure.    Patient reports feeling refreshed in AM.

## 2022-05-02 ENCOUNTER — OFFICE VISIT (OUTPATIENT)
Dept: ENDOCRINOLOGY | Facility: CLINIC | Age: 57
End: 2022-05-02
Payer: COMMERCIAL

## 2022-05-02 VITALS
HEART RATE: 77 BPM | DIASTOLIC BLOOD PRESSURE: 85 MMHG | BODY MASS INDEX: 35.62 KG/M2 | SYSTOLIC BLOOD PRESSURE: 153 MMHG | HEIGHT: 72 IN | WEIGHT: 263 LBS

## 2022-05-02 DIAGNOSIS — G62.9 PERIPHERAL POLYNEUROPATHY: ICD-10-CM

## 2022-05-02 DIAGNOSIS — E11.21 TYPE 2 DIABETES MELLITUS WITH DIABETIC NEPHROPATHY, WITH LONG-TERM CURRENT USE OF INSULIN (H): Primary | ICD-10-CM

## 2022-05-02 DIAGNOSIS — Z79.4 TYPE 2 DIABETES MELLITUS WITH DIABETIC NEPHROPATHY, WITH LONG-TERM CURRENT USE OF INSULIN (H): Primary | ICD-10-CM

## 2022-05-02 DIAGNOSIS — E78.00 HYPERCHOLESTEROLEMIA: ICD-10-CM

## 2022-05-02 LAB — SLPCOMP: NORMAL

## 2022-05-02 PROCEDURE — 99215 OFFICE O/P EST HI 40 MIN: CPT | Performed by: INTERNAL MEDICINE

## 2022-05-02 RX ORDER — INSULIN LISPRO 200 [IU]/ML
INJECTION, SOLUTION SUBCUTANEOUS
Qty: 90 ML | Refills: 3 | Status: SHIPPED | OUTPATIENT
Start: 2022-05-02 | End: 2022-07-22

## 2022-05-02 ASSESSMENT — PAIN SCALES - GENERAL: PAINLEVEL: EXTREME PAIN (8)

## 2022-05-02 NOTE — PATIENT INSTRUCTIONS
We appreciate your assistance in coordinating your healthcare.     Please upload your insulin pump, blood sugar meter and/or continuous glucose monitor at home 1-2 days before your next diabetes-related appointment.   This will allow your provider to review your  data before your scheduled virtual visit.    To ask a question to your Endocrine care team, please send them a [employer] message, or reach them by phone at [phone]     To expedite your medication refill(s), please contact your pharmacy and have them   fax a refill request to: [phone].  *Please allow 3 business days for routine medication refills.  *Please allow 5 business days for controlled substance medication refills.    For after-hours urgent Endocrine issues, that do not require 801, please dial [phone], and ask to speak with the Endocrinologist On-Call

## 2022-05-02 NOTE — LETTER
5/2/2022       RE: Camacho Bhagat  [address]  [facility] [address]     Dear Colleague,    Thank you for referring your patient, Camacho Bhagat, to the [facility] [place] [facility]. Please see a copy of my visit note below.    Dm  Betty Gu CMA    Outcome for 04/29/22 7:41 AM :Glucose sent via Email NL  Outcome for 04/27/22 9:00 AM :Patient is sharing data via clinic device website and patient has been instructed to update data on website. Find login information by using .Nuroa  NL      Camacho Bhagat is a 56 year old male with PMHx of CASTAÑEDA/HCC s/p liver transplant (3/2017), fibromyalgia, DVT, HTN, RONNIE, OA, who presents for follow-up of type 2 DM, diagnosed in 2002.  Oral meds first, later placed on insulin.     Related history: h/o CASTAÑEDA cirrhosis and HCC s/p liver transplant 3/2017. Course complicated by acute abdomen/neutropenic fever requiring right hemicolectomy and colostomy Fall 2017. He later underwent colostomy takedown 1/5/18 which was complicated by abdominal wall abscess at a drain site. During this course, he has also developed excessive proteinuria which is followed by nephrology.    Hemoglobin A1c was 5.2% on 3/3/22.    I reviewed both insulin pump and sensor records.    Insulin pump (Ominpod Dash - 8/2021) settings:  Basal rate   12 AM 1.45 units/h  3 AM 1.4 units/h  3 PM 1.35 units/h   6 PM 1.4 units/h   9 PM 1.45 units/h  Insulin to carbohydrate ratio 1 unit per 7.5 g   Correction 30   Active insulin time 3 hours  Blood glucose target 100  Correction above 125  He uses Humalog 200 pens to fill the pump reservoir.     He has noticed the blood sugar numbers have been higher whenever he takes Imodium.  He changes the pod every 2 days.     Total daily insulin dose is 74 (148) units, of which 46% is basal insulin.  The average glucose on the sensor is 174, with a standard deviation of 60, with a range variable  between 75 and 310.  71% of the glucose numbers are within target of , 6% are above 250 and there are no significant hypoglycemic episodes documented by the sensor.  The glucose trend reveals intermittent postprandial spike around 1 PM and around 9-10 PM.  For meals follow-up by hyperglycemia he boluses twice, with a correction bolus at the time the blood sugar is elevated on the sensor.  For most meals, he does a good job in taking insulin prior to eating.      He has not been exercising consistently.  His dog does not like to walk in the rain.  In the past, he tried Victoza and he was not able to tolerate it due to projectile vomiting.    Related meds:  Prednisone started for transplant and the dose remains 2.5 mg per day.     His blood pressure is now treated with 10 mg amlodipine daily,  maximum dose of metoprolol and Lasix, 40 mg twice daily.  The patient prefers to have his blood pressure addressed by nephrology.    Complications  + chronic complications.      1. Retinopathy: No known eye disease  Last eye exam was 11/12/19.  H/o DR. He has cataracts.       2.  Nephropathy: yes. CKD IIIa . He has significant proteinuria and f/up with nephrology.  Recent GFR in the 50s. He was started on losartan on 4/4/18 and the dose was increased to 50 mg daily. Discontinued losartan around 3/2019 due to hyperkalemia.     3. Neuropathy.  Gabapentin was prescribed to control the neuropathic pain in the left foot, which started over 10 years ago.  He takes 600 mg TID. No ulcers or infection. No amputation.  He now has bilateral foot pain.     4. Lipids: not on statin.   Last LDL 78, , HDL cholesterol 38 in 12/2021.     In general, he is able to recognize the hypoglycemic episodes: He feels flushed, warm and dizzy.  Denies experiencing tremor or palpitations.  The lowest blood glucose he remembers experiencing was in the 50s.  He does have glucagon injection at home.     Past Medical History  Past Medical History:    Diagnosis Date     Depressive disorder, not elsewhere classified      Diabetes type 2, controlled (H) 11/10/2016     Esophageal reflux      Fibromyalgia 1/2009    dx with Dr Benitez( Rheum)     Gangrene of finger (H) 8/25/2017     H/O deep venous thrombosis 11/2001    Permanent IVC filter in place.     H/O CASTAÑEDA (nonalcoholic steatohepatitis)      H/O Pneumonia, organism unspecified(486) 10/2001    Included ARDS, sepsis, and  acute renal failure; hospitalized, required tracheostomy placement.     H/O: HTN (hypertension) 11/2001    No longer prescribed antihypertensive medication.       History of CVA (cerebrovascular accident)      History of hepatocellular carcinoma      History of liver transplant (H)      History of obstructive sleep apnea     No longer wears CPAP since losing approximately 200 pounds with his liver transplant and its complications.       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     Osteoarthritis      Presence of PERMANENT IVC filter      Rheumatoid arthritis(714.0)    Osteopenia 2020    Past Surgical History  Past Surgical History:   Procedure Laterality Date     BENCH LIVER N/A 3/4/2017    Procedure: BENCH LIVER;  Surgeon: Jovan Tran MD;  Location: [facility]     CHOLECYSTECTOMY       COLONOSCOPY N/A 7/21/2017    Procedure: COMBINED COLONOSCOPY, SINGLE OR MULTIPLE BIOPSY/POLYPECTOMY BY BIOPSY;  Colonoscopy;  Surgeon: Izaiah Montes MD;  [facility]     ENDOSCOPIC RETROGRADE CHOLANGIOPANCREATOGRAM N/A 5/22/2018    Procedure: COMBINED ENDOSCOPIC RETROGRADE CHOLANGIOPANCREATOGRAPHY, PLACE TUBE/STENT;  Endoscopic Retrograde Cholangiopancreatography with sphincterotomy and pancreatic duct stent placement;  Surgeon: Zay Gaitan MD;  Location: [facility]     ENT SURGERY      Repair of deviated septum      ESOPHAGOSCOPY, GASTROSCOPY, DUODENOSCOPY (EGD), COMBINED N/A 2016    Procedure: COMBINED ESOPHAGOSCOPY, GASTROSCOPY, DUODENOSCOPY (EGD), BIOPSY SINGLE OR MULTIPLE;  Surgeon: Trent Pederson MD;  Location: [facility]     ESOPHAGOSCOPY, GASTROSCOPY, DUODENOSCOPY (EGD), COMBINED N/A 2017    Procedure: COMBINED ESOPHAGOSCOPY, GASTROSCOPY, DUODENOSCOPY (EGD);;  Surgeon: Los Wynn MD;  Location: [facility]     INCISION AND DRAINAGE ABDOMEN WASHOUT, COMBINED Right 2018    Procedure: COMBINED INCISION AND DRAINAGE ABDOMEN WASHOUT;  COMBINED INCISION AND DRAINAGE right ABDOMEN flank;  Surgeon: Sara Dinh MD;  [facility]     LAPAROTOMY EXPLORATORY N/A 2017    Procedure: LAPAROTOMY EXPLORATORY;  Exploratory Laparotomy, washout;  Surgeon: Tip Zhang MD;  [facility]     LAPAROTOMY EXPLORATORY N/A 2017    Procedure: LAPAROTOMY EXPLORATORY;  Exploratory Laparotomy, Washout with closure.;  Surgeon: Tip Zhang MD;  [facility]     LAPAROTOMY EXPLORATORY N/A 2017    Procedure: LAPAROTOMY EXPLORATORY;  Exploratory Laparotomy, Right angelique-colectomy, end ileostomy, mucosal fistula, partial omentectomy;  Surgeon: Sara Dinh MD;  Location: [facility]     ORTHOPEDIC SURGERY Right     Repair of dislocated wrist.       RELEASE TRIGGER FINGER Right 11/10/2017    Procedure: RELEASE TRIGGER FINGER;  Debride, V-Y Flap Right Index Finger;  Surgeon: Momo Noonan MD;  [facility]     TAKEDOWN ILEOSTOMY N/A 2018    Procedure: TAKEDOWN ILEOSTOMY;  Exploratory Laparotomy, Lysis of Adhesions, Takedown Of End Ileostomy, Takedown of mucocutaneous fistula, ileocolic resection  And Colorectal Anastomosis, Primary repair of Ventral Hernia, Anesthesia Block ;  Surgeon: Sara Dinh MD;  Location: [facility]     TRACHEOSTOMY  2001    During hospitalization for pneumonia.       TRANSPLANT LIVER RECIPIENT  DONOR N/A 3/4/2017     Procedure: TRANSPLANT LIVER RECIPIENT  DONOR;  Surgeon: Jovan Tran MD;  [email or web address] TOTAL KNEE ARTHROPLASTY      Right knee arthroscopy        Medications    Current Outpatient Medications:      alcohol swab prep pads, Use to swab area of injection/francisca as directed., Disp: 100 each, Rfl: 3     alpha-lipoic acid 600 MG capsule, Take 600 mg by mouth, Disp: , Rfl:      amLODIPine (NORVASC) 10 MG tablet, Take 1 tablet (10 mg) by mouth daily, Disp: 90 tablet, Rfl: 3     blood glucose (NO BRAND SPECIFIED) lancets standard, Use to test blood sugar 1 times daily or as directed., Disp: 100 lancet, Rfl: 3     blood glucose (NO BRAND SPECIFIED) test strip, Use to test blood sugar 1 times daily or as directed., Disp: 50 strip, Rfl: 11     blood glucose (NO BRAND SPECIFIED) test strip, Use to test blood sugar 3 times daily or as directed. Any covered brand preferred by Pt that works with meter., Disp: 300 strip, Rfl: 3     blood glucose calibration (NO BRAND SPECIFIED) solution, Use to calibrate meter., Disp: 1 Bottle, Rfl: 3     blood glucose monitoring (NO BRAND SPECIFIED) meter device kit, Use to test blood sugar 3 times daily or as directed. Any covered brand preferred by Pt., Disp: 1 kit, Rfl: 1     Blood Glucose Monitoring Suppl (CONTOUR BLOOD GLUCOSE SYSTEM) w/Device KIT, For use with the Omnipod Dash insulin pump, Disp: 1 kit, Rfl: 1     cholecalciferol (VITAMIN D3) 1000 UNIT tablet, Take 1 tablet (1,000 Units) by mouth daily (Patient taking differently: Take 1,000 Units by mouth every morning ), Disp: 100 tablet, Rfl: 3     CIALIS 5 MG tablet, Take 5 mg by mouth as needed , Disp: , Rfl: 1     Continuous Blood Gluc Sensor (DEXCOM G6 SENSOR) MISC, Change every 10 days., Disp: 9 each, Rfl: 3     Continuous Blood Gluc Transmit (DEXCOM G6 TRANSMITTER) MISC, Change every 3 months., Disp: 1 each, Rfl: 3     cyclobenzaprine (FLEXERIL) 10 MG tablet, Take 1 tablet (10 mg) by mouth 3 times daily  as needed for muscle spasms, Disp: 90 tablet, Rfl: 3     fluticasone (FLONASE) 50 MCG/ACT nasal spray, Spray 1 spray into both nostrils daily, Disp: 15 mL, Rfl: 1     furosemide (LASIX) 40 MG tablet, Take 1 tablet (40 mg) by mouth 2 times daily, Disp: 180 tablet, Rfl: 3     gabapentin (NEURONTIN) 300 MG capsule, Take 2 capsules (600 mg) by mouth 3 times daily, Disp: 540 capsule, Rfl: 3     Glucose Blood (BLOOD GLUCOSE TEST STRIPS) STRP, 1 strip by Lancet route 3 times daily, Disp: 300 each, Rfl: 3     Insulin Disposable Pump (OMNIPOD DASH 5 PACK PODS) MISC, 1 each every 48 hours, Disp: 45 each, Rfl: 3     Insulin Disposable Pump (OMNIPOD DASH 5 PACK PODS) MISC, 1 each every 3 days, Disp: 6 each, Rfl: 3     Insulin Lispro (HUMALOG KWIKPEN) 200 UNIT/ML soln, To be used in the Ominpod pump. Total daily dose up to 80 U., Disp: 80 mL, Rfl: 3     insulin pen needle (BD ENE U/F) 32G X 4 MM miscellaneous, Use 1 pen needle 4 times daily or as directed., Disp: 400 each, Rfl: 1     lidocaine (LIDODERM) 5 % patch, Place 1 patch onto the skin every 24 hours To prevent lidocaine toxicity, patient should be patch free for 12 hrs daily., Disp: 30 patch, Rfl: 11     metoprolol succinate ER (TOPROL-XL) 200 MG 24 hr tablet, Take 1 tablet (200 mg) by mouth daily, Disp: 90 tablet, Rfl: 3     multivitamin, therapeutic with minerals (MULTI-VITAMIN) TABS tablet, Take 1 tablet by mouth every morning, Disp: , Rfl:      mycophenolic acid (GENERIC EQUIVALENT) 360 MG EC tablet, TAKE 2 TABLETS (720 MG) BY MOUTH EVERY 12 HOURS, Disp: 360 tablet, Rfl: 3     nicotine (NICODERM CQ) 14 MG/24HR 24 hr patch, Place 1 patch onto the skin every 24 hours, Disp: 28 patch, Rfl: 11     oxyCODONE (ROXICODONE) 5 MG tablet, Take 1 tablet (5 mg) by mouth 4 times daily as needed for pain maximum 6 tablet(s) per day, Disp: 30 tablet, Rfl: 0     patiromer (VELTASSA) 8.4 g packet, Take 8.4 g by mouth daily, Disp: 90 each, Rfl: 3     predniSONE (DELTASONE) 2.5 MG  tablet, TAKE 1 TABLET BY MOUTH EVERY DAY, Disp: 90 tablet, Rfl: 3     sildenafil (REVATIO) 20 MG tablet, Take 2 tablets (40 mg) by mouth daily as needed (erectile dysfunction), Disp: 10 tablet, Rfl: 11     tacrolimus (GENERIC EQUIVALENT) 1 MG capsule, Take 4 capsules (4 mg) by mouth every morning AND 3 capsules (3 mg) every evening., Disp: 630 capsule, Rfl: 3     thin (NO BRAND SPECIFIED) lancets, Use with lanceting device. Any covered brand., Disp: 300 each, Rfl: 3     ursodiol (ACTIGALL) 500 MG tablet, TAKE 1 TABLET BY MOUTH TWICE A DAY, Disp: 180 tablet, Rfl: 3     varenicline (CHANTIX KEVEN) 0.5 MG X 11 & 1 MG X 42 tablet, Take 0.5 mg tab daily for 3 days, THEN 0.5 mg tab twice daily for 4 days, THEN 1 mg twice daily., Disp: 53 tablet, Rfl: 0    Social history:   . Former . He used to work as a nurse at [employer].  Former smoker for 2 years, 0.75 packs/day, quit in 1988.  He used to chew tobacco and he stopped this in 2015.  He has not been drinking any alcohol since the liver transplant. 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     OBJECTIVE:    Wt Readings from Last 10 Encounters:   03/07/22 119.7 kg (264 lb)   02/28/22 115.7 kg (255 lb)   12/06/21 127 kg (280 lb)   12/03/21 128.8 kg (284 lb)   06/07/21 114.3 kg (252 lb)   06/01/21 113.9 kg (251 lb)   06/01/21 114.3 kg (251 lb 14.4 oz)   12/14/20 109.8 kg (242 lb)   12/02/20 109.8 kg (242 lb)   10/20/20 112.5 kg (248 lb)     BP (!) 153/85 (BP Location: Left arm, Patient Position: Sitting, Cuff Size: Adult Large)   Pulse 77   Ht 1.829 m (6')   Wt 119.3 kg (263 lb)   BMI 35.67 kg/m       Physical exam  General appearance: No distress noted, General obesity.  Normal male hair pattern.   Bilateral gynecomastia with no palpable nodules, L>R  Eyes: conjunctivae and extraocular motions are normal. Pupils are equal, round, and reactive to light. No lid lag, no stare.  HENT: oropharynx clear and moist;  neck no JVD, no bruits, no thyromegaly, no palpable nodules  Cardiovascular: regular rhythm, no murmurs, distal pulses palpable, no edema  Respiratory: chest clear, no rales, no rhonchi  Gastrointestinal: abdomen soft, nontender, nondistended, +BS, no organomegaly  Musculoskeletal: normal tone and strength   Neurologic: reflexes normal and symmetric, no resting tremor  Psychiatric: affect and judgment normal   Skin: Significant abdominal scarring  Feet: Onychomycosis, sensation impaired to monofilament testing from ankles down, in a patchy distribution     Lab Results   Component Value Date    A1C 5.2 03/03/2022    A1C 7.1 (H) 08/18/2021    A1C 6.3 (H) 03/31/2021    A1C 6.7 (H) 02/12/2021    A1C 6.4 (H) 11/24/2020    A1C 6.2 (H) 10/06/2020    A1C 5.5 04/11/2018    HEMOGLOBINA1 5.3 12/06/2021    HEMOGLOBINA1 5.8 11/18/2019    HEMOGLOBINA1 6.1 (A) 11/19/2018       Hemoglobin   Date Value Ref Range Status   03/03/2022 13.3 13.3 - 17.7 g/dL Final   05/27/2021 11.9 (L) 13.3 - 17.7 g/dL Final     Hematocrit   Date Value Ref Range Status   03/03/2022 39.7 (L) 40.0 - 53.0 % Final   05/27/2021 34.7 (L) 40.0 - 53.0 % Final     Cholesterol   Date Value Ref Range Status   12/01/2021 157 <200 mg/dL Final   11/24/2020 134 <200 mg/dL Final     Cholesterol/HDL Ratio   Date Value Ref Range Status   07/03/2012 3.8 0.0 - 5.0 Final     HDL Cholesterol   Date Value Ref Range Status   11/24/2020 39 (L) >39 mg/dL Final     Direct Measure HDL   Date Value Ref Range Status   12/01/2021 38 (L) >=40 mg/dL Final     LDL Cholesterol Calculated   Date Value Ref Range Status   12/01/2021 78 <=100 mg/dL Final   11/24/2020 35 <100 mg/dL Final     Comment:     Desirable:       <100 mg/dl     VLDL-Cholesterol   Date Value Ref Range Status   07/03/2012 36 (H) 0 - 30 mg/dL Final     Triglycerides   Date Value Ref Range Status   12/01/2021 203 (H) <150 mg/dL Final   11/24/2020 298 (H) <150 mg/dL Final     Comment:     Borderline high:  150-199  mg/dl  High:             200-499 mg/dl  Very high:       >499 mg/dl       Albumin Urine mg/L   Date Value Ref Range Status   10/27/2017 122 mg/L Final     TSH   Date Value Ref Range Status   04/02/2018 2.74 0.40 - 4.00 mU/L Final     Last Basic Metabolic Panel:    Sodium   Date Value Ref Range Status   03/03/2022 138 133 - 144 mmol/L Final   06/08/2021 134 133 - 144 mmol/L Final     Potassium   Date Value Ref Range Status   03/03/2022 4.4 3.4 - 5.3 mmol/L Final   06/08/2021 4.9 3.4 - 5.3 mmol/L Final     Chloride   Date Value Ref Range Status   03/03/2022 108 94 - 109 mmol/L Final   06/08/2021 105 94 - 109 mmol/L Final     Calcium   Date Value Ref Range Status   03/03/2022 8.9 8.5 - 10.1 mg/dL Final   06/08/2021 8.9 8.5 - 10.1 mg/dL Final     Carbon Dioxide   Date Value Ref Range Status   06/08/2021 23 20 - 32 mmol/L Final     Carbon Dioxide (CO2)   Date Value Ref Range Status   03/03/2022 24 20 - 32 mmol/L Final     Urea Nitrogen   Date Value Ref Range Status   03/03/2022 48 (H) 7 - 30 mg/dL Final   06/08/2021 43 (H) 7 - 30 mg/dL Final     Creatinine   Date Value Ref Range Status   03/03/2022 1.66 (H) 0.66 - 1.25 mg/dL Final   06/08/2021 1.51 (H) 0.66 - 1.25 mg/dL Final     GFR Estimate   Date Value Ref Range Status   03/03/2022 48 (L) >60 mL/min/1.73m2 Final     Comment:     Effective December 21, 2021 eGFRcr in adults is calculated using the 2021 CKD-EPI creatinine equation which includes age and gender (Colten de la vega al., NEJM, DOI: 10.1056/NNQXdn3229950)   06/08/2021 51 (L) >60 mL/min/[1.73_m2] Final     Comment:     Non  GFR Calc  Starting 12/18/2018, serum creatinine based estimated GFR (eGFR) will be   calculated using the Chronic Kidney Disease Epidemiology Collaboration   (CKD-EPI) equation.       Glucose   Date Value Ref Range Status   03/03/2022 126 (H) 70 - 99 mg/dL Final   06/08/2021 249 (H) 70 - 99 mg/dL Final       AST   Date Value Ref Range Status   03/03/2022 22 0 - 45 U/L Final    05/27/2021 19 0 - 45 U/L Final     ALT   Date Value Ref Range Status   03/03/2022 34 0 - 70 U/L Final   05/27/2021 32 0 - 70 U/L Final     Albumin Urine mg/g Cr   Date Value Ref Range Status   10/27/2017 132.46 (H) 0 - 17 mg/g Cr Final     ASSESSMENT/PLAN:    Camacho Bhagat is a 56 year old man w/ PMx of CASTAÑEDA/HCC s/p liver transplant (3/2017), fibromyalgia, DVT, HTN, RONNIE, OA, and CVA who presents for follow-up of his DM-II     1. Type 2 Diabetes  A1c less reliable in the context of mild stable anemia.  Overall, the glycemic control is good.      Recommendations:  Change insulin to carb ratio from 12 noon to 12 midnight to 1 U per 7 gm CHO   Change correction to 25   For asymptomatic hypoglycemic episodes noted on the sensor, always check the blood sugar using the glucometer.  Schedule an eye exam   Have the pump downloaded for our review as needed    Discussed with the patient the option of considering treatment with GLP-1 agonist in order to address the weight gain.  He prefers to work on his diet for now.  Also discussed about the option of considering an SGLT2 inhibitor.  The patient expressed his concern regarding hyperkalemia as a possible side effect.    2.  Hypertension, uncontrolled  The patient prefers to follow-up on this with nephrology.    3. Peripheral neuropathy   He is taking a higher dose of gabapentin than recommended based on his GFR.     4. CV risk   Discussed about considering a statin. Pt declined.      Answers for HPI/ROS submitted by the patient on 4/28/2022  General Symptoms: No  Skin Symptoms: No  HENT Symptoms: No  EYE SYMPTOMS: No  HEART SYMPTOMS: No  LUNG SYMPTOMS: No  INTESTINAL SYMPTOMS: Yes  URINARY SYMPTOMS: No  REPRODUCTIVE SYMPTOMS: No  SKELETAL SYMPTOMS: Yes  BLOOD SYMPTOMS: No  NERVOUS SYSTEM SYMPTOMS: No  MENTAL HEALTH SYMPTOMS: No  Heart burn or indigestion: No  Nausea: No  Vomiting: No  Abdominal pain: No  Bloating: No  Constipation: No  Diarrhea: Yes  Blood in stool:  No  Black stools: No  Rectal or Anal pain: No  Fecal incontinence: No  Yellowing of skin or eyes: No  Vomit with blood: No  Change in stools: No  Back pain: Yes  Muscle aches: Yes  Neck pain: Yes  Swollen joints: Yes  Joint pain: Yes  Bone pain: No  Muscle cramps: Yes  Muscle weakness: No  Joint stiffness: Yes  Bone fracture: No    40 minutes spent on the date of the encounter doing chart review, history and exam, documentation and further activities as noted above.    Alisa West MD

## 2022-05-02 NOTE — PROGRESS NOTES
Camacho Bhagat is a 56 year old male with PMHx of CASTAÑEDA/HCC s/p liver transplant (3/2017), fibromyalgia, DVT, HTN, RONNIE, OA, who presents for follow-up of type 2 DM, diagnosed in 2002.  Oral meds first, later placed on insulin.     Related history: h/o CASTAÑEDA cirrhosis and HCC s/p liver transplant 3/2017. Course complicated by acute abdomen/neutropenic fever requiring right hemicolectomy and colostomy Fall 2017. He later underwent colostomy takedown 1/5/18 which was complicated by abdominal wall abscess at a drain site. During this course, he has also developed excessive proteinuria which is followed by nephrology.    Hemoglobin A1c was 5.2% on 3/3/22.    I reviewed both insulin pump and sensor records.    Insulin pump (Ominpod Dash - 8/2021) settings:  Basal rate   12 AM 1.45 units/h  3 AM 1.4 units/h  3 PM 1.35 units/h   6 PM 1.4 units/h   9 PM 1.45 units/h  Insulin to carbohydrate ratio 1 unit per 7.5 g   Correction 30   Active insulin time 3 hours  Blood glucose target 100  Correction above 125  He uses Humalog 200 pens to fill the pump reservoir.     He has noticed the blood sugar numbers have been higher whenever he takes Imodium.  He changes the pod every 2 days.     Total daily insulin dose is 74 (148) units, of which 46% is basal insulin.  The average glucose on the sensor is 174, with a standard deviation of 60, with a range variable between 75 and 310.  71% of the glucose numbers are within target of , 6% are above 250 and there are no significant hypoglycemic episodes documented by the sensor.  The glucose trend reveals intermittent postprandial spike around 1 PM and around 9-10 PM.  For meals follow-up by hyperglycemia he boluses twice, with a correction bolus at the time the blood sugar is elevated on the sensor.  For most meals, he does a good job in taking insulin prior to eating.      He has not been exercising consistently.  His dog does not like to walk in the rain.  In the past, he tried  Victoza and he was not able to tolerate it due to projectile vomiting.    Related meds:  Prednisone started for transplant and the dose remains 2.5 mg per day.     His blood pressure is now treated with 10 mg amlodipine daily,  maximum dose of metoprolol and Lasix, 40 mg twice daily.  The patient prefers to have his blood pressure addressed by nephrology.    Complications  + chronic complications.      1. Retinopathy: No known eye disease  Last eye exam was 11/12/19.  H/o DR. He has cataracts.       2.  Nephropathy: yes. CKD IIIa . He has significant proteinuria and f/up with nephrology.  Recent GFR in the 50s. He was started on losartan on 4/4/18 and the dose was increased to 50 mg daily. Discontinued losartan around 3/2019 due to hyperkalemia.     3. Neuropathy.  Gabapentin was prescribed to control the neuropathic pain in the left foot, which started over 10 years ago.  He takes 600 mg TID. No ulcers or infection. No amputation.  He now has bilateral foot pain.     4. Lipids: not on statin.   Last LDL 78, , HDL cholesterol 38 in 12/2021.     In general, he is able to recognize the hypoglycemic episodes: He feels flushed, warm and dizzy.  Denies experiencing tremor or palpitations.  The lowest blood glucose he remembers experiencing was in the 50s.  He does have glucagon injection at home.     Past Medical History  Past Medical History:   Diagnosis Date     Depressive disorder, not elsewhere classified      Diabetes type 2, controlled (H) 11/10/2016     Esophageal reflux      Fibromyalgia 1/2009    dx with Dr Benitez( Rhe[facility])     Gangrene of finger (H) 8/25/2017     H/O deep venous thrombosis 11/2001    Permanent IVC filter in place.     H/O CASTAÑEDA (nonalcoholic steatohepatitis)      H/O Pneumonia, organism unspecified(486) 10/2001    Included ARDS, sepsis, and  acute renal failure; hospitalized, required tracheostomy placement.     H/O: HTN (hypertension) 11/2001    No longer prescribed antihypertensive  medication.       History of CVA (cerebrovascular accident)      History of hepatocellular carcinoma      History of liver transplant (H)      History of obstructive sleep apnea     No longer wears CPAP since losing approximately 200 pounds with his liver transplant and its complications.       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     Osteoarthritis      Presence of PERMANENT IVC filter      Rheumatoid arthritis(714.0)    Osteopenia 2020    Past Surgical History  Past Surgical History:   Procedure Laterality Date     BENCH LIVER N/A 3/4/2017    Procedure: BENCH LIVER;  Surgeon: Jovan Tran MD;  Location: [facility]     CHOLECYSTECTOMY       COLONOSCOPY N/A 7/21/2017    Procedure: COMBINED COLONOSCOPY, SINGLE OR MULTIPLE BIOPSY/POLYPECTOMY BY BIOPSY;  Colonoscopy;  Surgeon: Izaiah Montes MD;  Location:  GI     ENDOSCOPIC RETROGRADE CHOLANGIOPANCREATOGRAM N/A 5/22/2018    Procedure: COMBINED ENDOSCOPIC RETROGRADE CHOLANGIOPANCREATOGRAPHY, PLACE TUBE/STENT;  Endoscopic Retrograde Cholangiopancreatography with sphincterotomy and pancreatic duct stent placement;  Surgeon: Zay Gaitan MD;  Location: [facility] OR     ENT SURGERY      Repair of deviated septum     ESOPHAGOSCOPY, GASTROSCOPY, DUODENOSCOPY (EGD), COMBINED N/A 8/4/2016    Procedure: COMBINED ESOPHAGOSCOPY, GASTROSCOPY, DUODENOSCOPY (EGD), BIOPSY SINGLE OR MULTIPLE;  Surgeon: Trent Pederson MD;  Location:  GI     ESOPHAGOSCOPY, GASTROSCOPY, DUODENOSCOPY (EGD), COMBINED N/A 8/27/2017    Procedure: COMBINED ESOPHAGOSCOPY, GASTROSCOPY, DUODENOSCOPY (EGD);;  Surgeon: Los Wynn MD;  Location:  GI     INCISION AND DRAINAGE ABDOMEN WASHOUT, COMBINED Right 2/14/2018    Procedure: COMBINED INCISION AND DRAINAGE ABDOMEN WASHOUT;  COMBINED INCISION AND DRAINAGE  right ABDOMEN flank;  Surgeon: Sara Dinh MD;  Location: [facility]     LAPAROTOMY EXPLORATORY N/A 2017    Procedure: LAPAROTOMY EXPLORATORY;  Exploratory Laparotomy, washout;  Surgeon: Tip Zhang MD;  Location: [facility]     LAPAROTOMY EXPLORATORY N/A 2017    Procedure: LAPAROTOMY EXPLORATORY;  Exploratory Laparotomy, Washout with closure.;  Surgeon: Tip Zhang MD;  Location: [facility]     LAPAROTOMY EXPLORATORY N/A 2017    Procedure: LAPAROTOMY EXPLORATORY;  Exploratory Laparotomy, Right angelique-colectomy, end ileostomy, mucosal fistula, partial omentectomy;  Surgeon: Sara Dinh MD;  Location: [facility]     ORTHOPEDIC SURGERY Right     Repair of dislocated wrist.       RELEASE TRIGGER FINGER Right 11/10/2017    Procedure: RELEASE TRIGGER FINGER;  Debride, V-Y Flap Right Index Finger;  Surgeon: Momo Noonan MD;  [facility]     TAKEDOWN ILEOSTOMY N/A 2018    Procedure: TAKEDOWN ILEOSTOMY;  Exploratory Laparotomy, Lysis of Adhesions, Takedown Of End Ileostomy, Takedown of mucocutaneous fistula, ileocolic resection  And Colorectal Anastomosis, Primary repair of Ventral Hernia, Anesthesia Block ;  Surgeon: Sara Dinh MD;  Location: [facility] OR     TRACHEOSTOMY  2001    During hospitalization for pneumonia.       TRANSPLANT LIVER RECIPIENT  DONOR N/A 3/4/2017    Procedure: TRANSPLANT LIVER RECIPIENT  DONOR;  Surgeon: Jovan Tran MD;  Location:  OR     [facility] TOTAL KNEE ARTHROPLASTY      Right knee arthroscopy        Medications    Current Outpatient Medications:      alcohol swab prep pads, Use to swab area of injection/francisca as directed., Disp: 100 each, Rfl: 3     alpha-lipoic acid 600 MG capsule, Take 600 mg by mouth, Disp: , Rfl:      amLODIPine (NORVASC) 10 MG tablet, Take 1 tablet (10 mg) by mouth daily, Disp: 90 tablet, Rfl: 3     blood glucose (NO BRAND SPECIFIED) lancets standard, Use to test blood sugar 1  times daily or as directed., Disp: 100 lancet, Rfl: 3     blood glucose (NO BRAND SPECIFIED) test strip, Use to test blood sugar 1 times daily or as directed., Disp: 50 strip, Rfl: 11     blood glucose (NO BRAND SPECIFIED) test strip, Use to test blood sugar 3 times daily or as directed. Any covered brand preferred by Pt that works with meter., Disp: 300 strip, Rfl: 3     blood glucose calibration (NO BRAND SPECIFIED) solution, Use to calibrate meter., Disp: 1 Bottle, Rfl: 3     blood glucose monitoring (NO BRAND SPECIFIED) meter device kit, Use to test blood sugar 3 times daily or as directed. Any covered brand preferred by Pt., Disp: 1 kit, Rfl: 1     Blood Glucose Monitoring Suppl (CONTOUR BLOOD GLUCOSE SYSTEM) w/Device KIT, For use with the Omnipod Dash insulin pump, Disp: 1 kit, Rfl: 1     cholecalciferol (VITAMIN D3) 1000 UNIT tablet, Take 1 tablet (1,000 Units) by mouth daily (Patient taking differently: Take 1,000 Units by mouth every morning ), Disp: 100 tablet, Rfl: 3     CIALIS 5 MG tablet, Take 5 mg by mouth as needed , Disp: , Rfl: 1     Continuous Blood Gluc Sensor (DEXCOM G6 SENSOR) MISC, Change every 10 days., Disp: 9 each, Rfl: 3     Continuous Blood Gluc Transmit (DEXCOM G6 TRANSMITTER) MISC, Change every 3 months., Disp: 1 each, Rfl: 3     cyclobenzaprine (FLEXERIL) 10 MG tablet, Take 1 tablet (10 mg) by mouth 3 times daily as needed for muscle spasms, Disp: 90 tablet, Rfl: 3     fluticasone (FLONASE) 50 MCG/ACT nasal spray, Spray 1 spray into both nostrils daily, Disp: 15 mL, Rfl: 1     furosemide (LASIX) 40 MG tablet, Take 1 tablet (40 mg) by mouth 2 times daily, Disp: 180 tablet, Rfl: 3     gabapentin (NEURONTIN) 300 MG capsule, Take 2 capsules (600 mg) by mouth 3 times daily, Disp: 540 capsule, Rfl: 3     Glucose Blood (BLOOD GLUCOSE TEST STRIPS) STRP, 1 strip by Lancet route 3 times daily, Disp: 300 each, Rfl: 3     Insulin Disposable Pump (OMNIPOD DASH 5 PACK PODS) MISC, 1 each every 48  hours, Disp: 45 each, Rfl: 3     Insulin Disposable Pump (OMNIPOD DASH 5 PACK PODS) MISC, 1 each every 3 days, Disp: 6 each, Rfl: 3     Insulin Lispro (HUMALOG KWIKPEN) 200 UNIT/ML soln, To be used in the Ominpod pump. Total daily dose up to 80 U., Disp: 80 mL, Rfl: 3     insulin pen needle (BD ENE U/F) 32G X 4 MM miscellaneous, Use 1 pen needle 4 times daily or as directed., Disp: 400 each, Rfl: 1     lidocaine (LIDODERM) 5 % patch, Place 1 patch onto the skin every 24 hours To prevent lidocaine toxicity, patient should be patch free for 12 hrs daily., Disp: 30 patch, Rfl: 11     metoprolol succinate ER (TOPROL-XL) 200 MG 24 hr tablet, Take 1 tablet (200 mg) by mouth daily, Disp: 90 tablet, Rfl: 3     multivitamin, therapeutic with minerals (MULTI-VITAMIN) TABS tablet, Take 1 tablet by mouth every morning, Disp: , Rfl:      mycophenolic acid (GENERIC EQUIVALENT) 360 MG EC tablet, TAKE 2 TABLETS (720 MG) BY MOUTH EVERY 12 HOURS, Disp: 360 tablet, Rfl: 3     nicotine (NICODERM CQ) 14 MG/24HR 24 hr patch, Place 1 patch onto the skin every 24 hours, Disp: 28 patch, Rfl: 11     oxyCODONE (ROXICODONE) 5 MG tablet, Take 1 tablet (5 mg) by mouth 4 times daily as needed for pain maximum 6 tablet(s) per day, Disp: 30 tablet, Rfl: 0     patiromer (VELTASSA) 8.4 g packet, Take 8.4 g by mouth daily, Disp: 90 each, Rfl: 3     predniSONE (DELTASONE) 2.5 MG tablet, TAKE 1 TABLET BY MOUTH EVERY DAY, Disp: 90 tablet, Rfl: 3     sildenafil (REVATIO) 20 MG tablet, Take 2 tablets (40 mg) by mouth daily as needed (erectile dysfunction), Disp: 10 tablet, Rfl: 11     tacrolimus (GENERIC EQUIVALENT) 1 MG capsule, Take 4 capsules (4 mg) by mouth every morning AND 3 capsules (3 mg) every evening., Disp: 630 capsule, Rfl: 3     thin (NO BRAND SPECIFIED) lancets, Use with lanceting device. Any covered brand., Disp: 300 each, Rfl: 3     ursodiol (ACTIGALL) 500 MG tablet, TAKE 1 TABLET BY MOUTH TWICE A DAY, Disp: 180 tablet, Rfl: 3      varenicline (CHANTIX KEVEN) 0.5 MG X 11 & 1 MG X 42 tablet, Take 0.5 mg tab daily for 3 days, THEN 0.5 mg tab twice daily for 4 days, THEN 1 mg twice daily., Disp: 53 tablet, Rfl: 0    Social history:   . Former . He used to work as a nurse at [employer].  Former smoker for 2 years, 0.75 packs/day, quit in 1988.  He used to chew tobacco and he stopped this in 2015.  He has not been drinking any alcohol since the liver transplant. 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     OBJECTIVE:    Wt Readings from Last 10 Encounters:   03/07/22 119.7 kg (264 lb)   02/28/22 115.7 kg (255 lb)   12/06/21 127 kg (280 lb)   12/03/21 128.8 kg (284 lb)   06/07/21 114.3 kg (252 lb)   06/01/21 113.9 kg (251 lb)   06/01/21 114.3 kg (251 lb 14.4 oz)   12/14/20 109.8 kg (242 lb)   12/02/20 109.8 kg (242 lb)   10/20/20 112.5 kg (248 lb)     BP (!) 153/85 (BP Location: Left arm, Patient Position: Sitting, Cuff Size: Adult Large)   Pulse 77   Ht 1.829 m (6')   Wt 119.3 kg (263 lb)   BMI 35.67 kg/m       Physical exam  General appearance: No distress noted, General obesity.  Normal male hair pattern.   Bilateral gynecomastia with no palpable nodules, L>R  Eyes: conjunctivae and extraocular motions are normal. Pupils are equal, round, and reactive to light. No lid lag, no stare.  HENT: oropharynx clear and moist; neck no JVD, no bruits, no thyromegaly, no palpable nodules  Cardiovascular: regular rhythm, no murmurs, distal pulses palpable, no edema  Respiratory: chest clear, no rales, no rhonchi  Gastrointestinal: abdomen soft, nontender, nondistended, +BS, no organomegaly  Musculoskeletal: normal tone and strength   Neurologic: reflexes normal and symmetric, no resting tremor  Psychiatric: affect and judgment normal   Skin: Significant abdominal scarring  Feet: Onychomycosis, sensation impaired to monofilament testing from ankles down, in a patchy distribution     Lab Results    Component Value Date    A1C 5.2 03/03/2022    A1C 7.1 (H) 08/18/2021    A1C 6.3 (H) 03/31/2021    A1C 6.7 (H) 02/12/2021    A1C 6.4 (H) 11/24/2020    A1C 6.2 (H) 10/06/2020    A1C 5.5 04/11/2018    HEMOGLOBINA1 5.3 12/06/2021    HEMOGLOBINA1 5.8 11/18/2019    HEMOGLOBINA1 6.1 (A) 11/19/2018       Hemoglobin   Date Value Ref Range Status   03/03/2022 13.3 13.3 - 17.7 g/dL Final   05/27/2021 11.9 (L) 13.3 - 17.7 g/dL Final     Hematocrit   Date Value Ref Range Status   03/03/2022 39.7 (L) 40.0 - 53.0 % Final   05/27/2021 34.7 (L) 40.0 - 53.0 % Final     Cholesterol   Date Value Ref Range Status   12/01/2021 157 <200 mg/dL Final   11/24/2020 134 <200 mg/dL Final     Cholesterol/HDL Ratio   Date Value Ref Range Status   07/03/2012 3.8 0.0 - 5.0 Final     HDL Cholesterol   Date Value Ref Range Status   11/24/2020 39 (L) >39 mg/dL Final     Direct Measure HDL   Date Value Ref Range Status   12/01/2021 38 (L) >=40 mg/dL Final     LDL Cholesterol Calculated   Date Value Ref Range Status   12/01/2021 78 <=100 mg/dL Final   11/24/2020 35 <100 mg/dL Final     Comment:     Desirable:       <100 mg/dl     VLDL-Cholesterol   Date Value Ref Range Status   07/03/2012 36 (H) 0 - 30 mg/dL Final     Triglycerides   Date Value Ref Range Status   12/01/2021 203 (H) <150 mg/dL Final   11/24/2020 298 (H) <150 mg/dL Final     Comment:     Borderline high:  150-199 mg/dl  High:             200-499 mg/dl  Very high:       >499 mg/dl       Albumin Urine mg/L   Date Value Ref Range Status   10/27/2017 122 mg/L Final     TSH   Date Value Ref Range Status   04/02/2018 2.74 0.40 - 4.00 mU/L Final     Last Basic Metabolic Panel:    Sodium   Date Value Ref Range Status   03/03/2022 138 133 - 144 mmol/L Final   06/08/2021 134 133 - 144 mmol/L Final     Potassium   Date Value Ref Range Status   03/03/2022 4.4 3.4 - 5.3 mmol/L Final   06/08/2021 4.9 3.4 - 5.3 mmol/L Final     Chloride   Date Value Ref Range Status   03/03/2022 108 94 - 109 mmol/L  Final   06/08/2021 105 94 - 109 mmol/L Final     Calcium   Date Value Ref Range Status   03/03/2022 8.9 8.5 - 10.1 mg/dL Final   06/08/2021 8.9 8.5 - 10.1 mg/dL Final     Carbon Dioxide   Date Value Ref Range Status   06/08/2021 23 20 - 32 mmol/L Final     Carbon Dioxide (CO2)   Date Value Ref Range Status   03/03/2022 24 20 - 32 mmol/L Final     Urea Nitrogen   Date Value Ref Range Status   03/03/2022 48 (H) 7 - 30 mg/dL Final   06/08/2021 43 (H) 7 - 30 mg/dL Final     Creatinine   Date Value Ref Range Status   03/03/2022 1.66 (H) 0.66 - 1.25 mg/dL Final   06/08/2021 1.51 (H) 0.66 - 1.25 mg/dL Final     GFR Estimate   Date Value Ref Range Status   03/03/2022 48 (L) >60 mL/min/1.73m2 Final     Comment:     Effective December 21, 2021 eGFRcr in adults is calculated using the 2021 CKD-EPI creatinine equation which includes age and gender (Colten et al., NEJM, DOI: 10.1056/HVKUdv3114266)   06/08/2021 51 (L) >60 mL/min/[1.73_m2] Final     Comment:     Non  GFR Calc  Starting 12/18/2018, serum creatinine based estimated GFR (eGFR) will be   calculated using the Chronic Kidney Disease Epidemiology Collaboration   (CKD-EPI) equation.       Glucose   Date Value Ref Range Status   03/03/2022 126 (H) 70 - 99 mg/dL Final   06/08/2021 249 (H) 70 - 99 mg/dL Final       AST   Date Value Ref Range Status   03/03/2022 22 0 - 45 U/L Final   05/27/2021 19 0 - 45 U/L Final     ALT   Date Value Ref Range Status   03/03/2022 34 0 - 70 U/L Final   05/27/2021 32 0 - 70 U/L Final     Albumin Urine mg/g Cr   Date Value Ref Range Status   10/27/2017 132.46 (H) 0 - 17 mg/g Cr Final     ASSESSMENT/PLAN:    Camacho Bhagat is a 56 year old man w/ PMx of CASTAÑEDA/HCC s/p liver transplant (3/2017), fibromyalgia, DVT, HTN, RONNIE, OA, and CVA who presents for follow-up of his DM-II     1. Type 2 Diabetes  A1c less reliable in the context of mild stable anemia.  Overall, the glycemic control is good.      Recommendations:  Change insulin to  carb ratio from 12 noon to 12 midnight to 1 U per 7 gm CHO   Change correction to 25   For asymptomatic hypoglycemic episodes noted on the sensor, always check the blood sugar using the glucometer.  Schedule an eye exam   Have the pump downloaded for our review as needed    Discussed with the patient the option of considering treatment with GLP-1 agonist in order to address the weight gain.  He prefers to work on his diet for now.  Also discussed about the option of considering an SGLT2 inhibitor.  The patient expressed his concern regarding hyperkalemia as a possible side effect.    2.  Hypertension, uncontrolled  The patient prefers to follow-up on this with nephrology.    3. Peripheral neuropathy   He is taking a higher dose of gabapentin than recommended based on his GFR.     4. CV risk   Discussed about considering a statin. Pt declined.      Answers for HPI/ROS submitted by the patient on 4/28/2022  General Symptoms: No  Skin Symptoms: No  HENT Symptoms: No  EYE SYMPTOMS: No  HEART SYMPTOMS: No  LUNG SYMPTOMS: No  INTESTINAL SYMPTOMS: Yes  URINARY SYMPTOMS: No  REPRODUCTIVE SYMPTOMS: No  SKELETAL SYMPTOMS: Yes  BLOOD SYMPTOMS: No  NERVOUS SYSTEM SYMPTOMS: No  MENTAL HEALTH SYMPTOMS: No  Heart burn or indigestion: No  Nausea: No  Vomiting: No  Abdominal pain: No  Bloating: No  Constipation: No  Diarrhea: Yes  Blood in stool: No  Black stools: No  Rectal or Anal pain: No  Fecal incontinence: No  Yellowing of skin or eyes: No  Vomit with blood: No  Change in stools: No  Back pain: Yes  Muscle aches: Yes  Neck pain: Yes  Swollen joints: Yes  Joint pain: Yes  Bone pain: No  Muscle cramps: Yes  Muscle weakness: No  Joint stiffness: Yes  Bone fracture: No    40 minutes spent on the date of the encounter doing chart review, history and exam, documentation and further activities as noted above.

## 2022-05-03 ENCOUNTER — INFUSION THERAPY VISIT (OUTPATIENT)
Dept: NURSING | Facility: CLINIC | Age: 57
End: 2022-05-03
Payer: COMMERCIAL

## 2022-05-03 DIAGNOSIS — Z29.89 PROPHYLACTIC IMMUNOTHERAPY: Primary | ICD-10-CM

## 2022-05-03 PROCEDURE — M0220 PR INJECTION TIXAGEVIMAB & CILGAVIMAB (EVUSHELD): HCPCS | Performed by: INTERNAL MEDICINE

## 2022-05-03 NOTE — PROGRESS NOTES
Evusheld Consent   Confirmed patient received the Emergency Use Authorization Fact Sheet for Patients, Parents and Caregivers prior to receiving medication. We discussed the following risks and benefits of receiving EVUSHELD, as well as alternative treatments and the patient wished to proceed with EVUSHELD.     Providers believe the benefits of Evusheld outweigh the risks for all moderately to severely immunocompromised patients.      Benefits:   Evusheld is a synthetic antibody that provides protection against COVID-19 for 6 months and is recommended for patients that have a weakened immune system that may not be able to make antibodies themselves.     Risks:    There is a very small risk of allergic reactions and you should notify us if you have any symptoms of an allergic reaction after the injection. There were also some extremely rare cardiac events reported in the initial trials in people that already had heart disease, but experts do not think these were caused by the medication. We will observe you for any chest pain or trouble breathing after the injections and you can reach out to your provider if any of these symptoms develop.     Alternatives:   Vaccines to prevent COVID-19 are approved or available under Emergency Use Authorization. Use of EVUSHELD does not replace vaccination against COVID-19. You can continue to mask and isolate to avoid infections. It is your choice to receive or not receive EVUSHELD. Should you decide not to receive EVUSHELD, it will not change your standard medical care.     Patient does consent to the injection.      EVUSHELD Administration Note:  Camacho Bhagat presents today for EVUSHELD.   present during visit today: Not Applicable.    Consent:   Informed Consent confirmed in chart, obtained on this date: 5/3/22. Pt gave verbal consent.     Post Injection Assessment:   Patient tolerated injection without incident.  Patient observed for 60 minutes post injection per  protocol.      Patient was observed in the room for a minimum of 10 minutes after injection per standard, then remained in the buidling for a total 60 minute observation period.         Discharge Plan:    Discharge instructions reviewed with: Patient.  Patient and/or family verbalized understanding of discharge instructions and all questions answered.  Patient discharged in stable condition accompanied by: alee Boss RN  .     .[email or web address]

## 2022-05-06 ENCOUNTER — TELEPHONE (OUTPATIENT)
Dept: TRANSPLANT | Facility: CLINIC | Age: 57
End: 2022-05-06
Payer: COMMERCIAL

## 2022-05-06 NOTE — TELEPHONE ENCOUNTER
Annual chart review completed and Camacho is up to date on post transplant recommended labs/visits.

## 2022-05-10 ENCOUNTER — TELEPHONE (OUTPATIENT)
Dept: SLEEP MEDICINE | Facility: CLINIC | Age: 57
End: 2022-05-10
Payer: COMMERCIAL

## 2022-05-10 NOTE — TELEPHONE ENCOUNTER
Left message for patient to call [facility] s main line at [phone] to schedule CPAP setup appt.

## 2022-05-10 NOTE — TELEPHONE ENCOUNTER
Patient returned call to schedule CPAP setup appt for Wed, 5/18/22 at 1:00 pm in [facility] showroom.

## 2022-05-16 ASSESSMENT — SLEEP AND FATIGUE QUESTIONNAIRES
HOW LIKELY ARE YOU TO NOD OFF OR FALL ASLEEP WHEN YOU ARE A PASSENGER IN A CAR FOR AN HOUR WITHOUT A BREAK: MODERATE CHANCE OF DOZING
HOW LIKELY ARE YOU TO NOD OFF OR FALL ASLEEP WHILE WATCHING TV: HIGH CHANCE OF DOZING
HOW LIKELY ARE YOU TO NOD OFF OR FALL ASLEEP WHILE SITTING AND READING: HIGH CHANCE OF DOZING
HOW LIKELY ARE YOU TO NOD OFF OR FALL ASLEEP WHILE SITTING QUIETLY AFTER LUNCH WITHOUT ALCOHOL: SLIGHT CHANCE OF DOZING
HOW LIKELY ARE YOU TO NOD OFF OR FALL ASLEEP WHILE SITTING AND TALKING TO SOMEONE: WOULD NEVER DOZE
HOW LIKELY ARE YOU TO NOD OFF OR FALL ASLEEP WHILE SITTING INACTIVE IN A PUBLIC PLACE: WOULD NEVER DOZE
HOW LIKELY ARE YOU TO NOD OFF OR FALL ASLEEP IN A CAR, WHILE STOPPED FOR A FEW MINUTES IN TRAFFIC: WOULD NEVER DOZE
HOW LIKELY ARE YOU TO NOD OFF OR FALL ASLEEP WHILE LYING DOWN TO REST IN THE AFTERNOON WHEN CIRCUMSTANCES PERMIT: HIGH CHANCE OF DOZING

## 2022-05-20 ENCOUNTER — OFFICE VISIT (OUTPATIENT)
Dept: SLEEP MEDICINE | Facility: CLINIC | Age: 57
End: 2022-05-20
Payer: COMMERCIAL

## 2022-05-20 VITALS
WEIGHT: 260 LBS | HEIGHT: 72 IN | HEART RATE: 74 BPM | OXYGEN SATURATION: 99 % | BODY MASS INDEX: 35.21 KG/M2 | DIASTOLIC BLOOD PRESSURE: 95 MMHG | SYSTOLIC BLOOD PRESSURE: 176 MMHG

## 2022-05-20 DIAGNOSIS — G47.33 OSA (OBSTRUCTIVE SLEEP APNEA): Primary | ICD-10-CM

## 2022-05-20 PROCEDURE — 99213 OFFICE O/P EST LOW 20 MIN: CPT | Performed by: PHYSICIAN ASSISTANT

## 2022-05-20 NOTE — PROGRESS NOTES
Sleep Follow-Up Visit:    Date on this visit: 5/20/2022    Camacho Bhagat comes in today for follow-up of his sleep study done on 4/27/22. Camacho Bhagat was initially seen for re-evaluation of sleep apnea.  liver transplantation in 2016 for hepatocellular carcinoma, rheumatoid arthritis on chronic low-dose prednisone therapy, class I obesity with BMI 35.8, CKD with EGFR 48 consistent with stage III, previously diagnosed severe obstructive sleep apnea in 2003 at outside sleep lab in [place], history of using BiPAP.     PSG Results:  Weight: 264 pounds  Diagnostic PSG  Sleep Architecture: Sleep fragmentation  The total recording time of the polysomnogram was 140.2 minutes. The total sleep time was 125.5 minutes. Sleep latency was 0.2 minutes. REM latency was 123.0 minutes. Arousal index was 36.8 arousals per hour. Sleep efficiency was 89.5%. Wake after sleep onset was 14.5 minutes. The patient spent 21.9% of total sleep time in Stage N1, 73.7% in Stage N2, 0.0% in Stage N3, and 4.4% in REM. Time in REM supine was 5.5 minutes.     Respiration: Severe RONNIE with hypoxemia    Events ? The polysomnogram revealed a presence of 70 obstructive, - central, and 4 mixed apneas resulting in an apnea index of 35.4 events per hour. There were 93 obstructive hypopneas and - central hypopneas resulting in an obstructive hypopnea index of 44.5 and central hypopnea index of - events per hour. The combined apnea/hypopnea index was 79.8 events per hour (central apnea/hypopnea index was - events per hour).  The REM AHI was 65.5 events per hour. The supine AHI was 82.8 events per hour. The RERA index was 0.5 events per hour. The RDI was 80.3 events per hour.    Snoring - was reported as loud.    Respiratory rate and pattern - was notable for normal respiratory rate and pattern.    Sustained Sleep Associated Hypoventilation - Transcutaneous carbon dioxide monitoring was not used.    Sleep Associated Hypoxemia - (Greater than 5 minutes O2  sat at or below 88%) was present. Baseline oxygen saturation was 90.4%. Lowest oxygen saturation was 67.0%. Time spent less than or equal to 88% was 40.4 minutes. Time spent less than or equal to 89% was 48.2 minutes.      Treatment PSG  Sleep Architecture: Decreased sleep fragmentation  At 12:56:44 AM the patient was placed on PAP treatment and was titrated at pressures ranging from CPAP 5 cmH2O up to CPAP 14 cmH2O. The total recording time of the treatment portion of the study was 352.8 minutes. The total sleep time was 337.0 minutes. During the treatment portion of the study the sleep latency was 2.5 minutes. REM latency was 43.5 minutes. Arousal index was 8.0 arousals per hour. Sleep efficiency was 95.5%. Wake after sleep onset was 13.0 minutes. The patient spent 6.5% of total sleep time in Stage N1, 32.9% in Stage N2, 17.8% in Stage N3, and 42.7% in REM. Time in REM supine was 144.0 minutes.      Respiration: Sleep disordered breathing noted on the baseline portion of the study was nearly fully resolved with positive airway pressure therapy.    The final pressure was CPAP 14 cmH2O with an AHI of 0.5 events per hour. Time in REM supine on final pressure was 68.0 minutes.      Movement Activity:     Periodic Limb Movements  ? During the study, there were 0 PLMs recorded.    REM EMG Activity - Excessive muscle activity was not present.    Nocturnal Behavior - Abnormal sleep related behaviors were not noted.    Bruxism - None apparent.     Cardiac Summary: Sinus  During the diagnostic portion of the study, the average pulse rate was 60.5 bpm. The minimum pulse rate was 52.0 bpm while the maximum pulse rate was 76.0 bpm.     During the treatment portion of the study, the average pulse rate was 53.3 bpm. The minimum pulse rate was 47.0 bpm while the maximum pulse rate was 72.0 bpm.     He got a new CPAP 5-20 cm on 5/17/22 at [place]. He has a nasal pillows mask, p10. It fits well. He has not noticed irritation of  his nose as of yet. He is not aware of snoring with it. He is comfortable with the air pressure. He notices a lot more energy with CPAP.     The compliance data shows that the patient used the CPAP for 2/30 nights, 7% of nights for >4 hours (he has only had it 2 days).  The 95th% pressure is 15.8 cm.  The 95th% leak is 19.8 lpm.  The average nightly usage is 9:05 (wears it some before going to sleep).  The average AHI is 1.3/hr.      Past medical/surgical history, family history, social history, medications and allergies were reviewed.      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   Plan:  Increase tacrolimus dose, currently on low dose tacrolimus with level < 3.0.  Plan to increase for goal = 8.    Also on myfortic 720mg Q 12 hours.   Prednisone 2.5mg q day.           Abscess, intra-abdominal, postoperative 02/13/2018     Priority: Medium     Ileostomy in place (H) 01/05/2018     Priority: Medium     Peristomal skin complication 11/16/2017     Priority: Medium     Replacing diagnoses that were inactivated after the 10/1/2021 regulatory import.       Painful periwound skin 11/16/2017     Priority: Medium     Encounter for attention to ileostomy (H) 11/16/2017     Priority: Medium     History of creation of ostomy (H) 10/30/2017     Priority: Medium      Elevated serum creatinine 10/16/2017     Priority: Medium     Pancytopenia (H) 08/25/2017     Priority: Medium     Ischemia of both lower extremities 08/25/2017     Priority: Medium     Distal ischemia due to shock/high pressor requirements       S/P liver transplant (H) 07/26/2017     Priority: Medium     Neutropenic colitis (H) 07/04/2017     Priority: Medium     Immunosuppressed status (H) 03/10/2017     Priority: Medium     Liver transplant recipient (H) 03/04/2017     Priority: Medium     Hyperkalemia 02/14/2017     Priority: Medium     Hepatocellular carcinoma (H) 01/27/2016     Priority: Medium     Osteoarthritis 01/18/2015     Priority: Medium     Rheumatoid arthritis (H) 01/18/2015     Priority: Medium     Medication refill- do not delete  11/13/2013     Priority: Medium     Fibromyalgia 08/15/2011     Priority: Medium     Sicca syndrome (H) 08/15/2011     Priority: Medium     Testicular hypofunction      Priority: Medium     Problem list name updated by automated process. Provider to review       Carpal tunnel syndrome 07/08/2002     Priority: Medium        Impression/Plan:    (G47.33) RONNIE (obstructive sleep apnea)  (primary encounter diagnosis)  Comment: Camacho's study shows severe RONNIE, AHI about 80/hr. He did well on CPAP 14 cm in REM supine. He just started on a new CPAP 2 days ago and so far everything is going well. He has no concerns. He will need a follow up in 2 months for insurance purposes.  Plan: Continue auto CPAP 5-20 cm.     He will follow up with me in about 2 month(s).     15 minutes were spent on the date of the encounter doing chart review, history and exam, documentation and further activities as noted above.     Bennett Goltz, PA-C    CC: Shay Kirkpatrick MD

## 2022-05-20 NOTE — NURSING NOTE
Chief Complaint   Patient presents with     Study Results     PSG & CPAP f/u       Initial BP (!) 176/95   Pulse 74   Ht 1.829 m (6')   Wt 117.9 kg (260 lb)   SpO2 99%   BMI 35.26 kg/m   Estimated body mass index is 35.26 kg/m  as calculated from the following:    Height as of this encounter: 1.829 m (6').    Weight as of this encounter: 117.9 kg (260 lb).    Medication Reconciliation: complete  Kathy Toth MA

## 2022-05-24 ENCOUNTER — LAB (OUTPATIENT)
Dept: LAB | Facility: CLINIC | Age: 57
End: 2022-05-24
Payer: COMMERCIAL

## 2022-05-24 DIAGNOSIS — N18.31 STAGE 3A CHRONIC KIDNEY DISEASE (H): ICD-10-CM

## 2022-05-24 DIAGNOSIS — Z13.220 LIPID SCREENING: ICD-10-CM

## 2022-05-24 DIAGNOSIS — Z94.4 LIVER REPLACED BY TRANSPLANT (H): ICD-10-CM

## 2022-05-24 LAB
ALBUMIN SERPL-MCNC: 3.4 G/DL (ref 3.4–5)
ALBUMIN UR-MCNC: 100 MG/DL
ALP SERPL-CCNC: 203 U/L (ref 40–150)
ALT SERPL W P-5'-P-CCNC: 31 U/L (ref 0–70)
ANION GAP SERPL CALCULATED.3IONS-SCNC: 2 MMOL/L (ref 3–14)
APPEARANCE UR: CLEAR
AST SERPL W P-5'-P-CCNC: 25 U/L (ref 0–45)
BACTERIA #/AREA URNS HPF: ABNORMAL /HPF
BILIRUB DIRECT SERPL-MCNC: <0.1 MG/DL (ref 0–0.2)
BILIRUB SERPL-MCNC: 0.7 MG/DL (ref 0.2–1.3)
BILIRUB UR QL STRIP: NEGATIVE
BUN SERPL-MCNC: 36 MG/DL (ref 7–30)
CALCIUM SERPL-MCNC: 8.7 MG/DL (ref 8.5–10.1)
CHLORIDE BLD-SCNC: 112 MMOL/L (ref 94–109)
CO2 SERPL-SCNC: 24 MMOL/L (ref 20–32)
COLOR UR AUTO: ABNORMAL
CREAT SERPL-MCNC: 1.44 MG/DL (ref 0.66–1.25)
CREAT UR-MCNC: 86 MG/DL
ERYTHROCYTE [DISTWIDTH] IN BLOOD BY AUTOMATED COUNT: 13.7 % (ref 10–15)
GFR SERPL CREATININE-BSD FRML MDRD: 57 ML/MIN/1.73M2
GLUCOSE BLD-MCNC: 101 MG/DL (ref 70–99)
GLUCOSE UR STRIP-MCNC: NEGATIVE MG/DL
HCT VFR BLD AUTO: 37.5 % (ref 40–53)
HGB BLD-MCNC: 12.1 G/DL (ref 13.3–17.7)
HGB UR QL STRIP: ABNORMAL
HYALINE CASTS: 1 /LPF
KETONES UR STRIP-MCNC: NEGATIVE MG/DL
LEUKOCYTE ESTERASE UR QL STRIP: NEGATIVE
MCH RBC QN AUTO: 30.3 PG (ref 26.5–33)
MCHC RBC AUTO-ENTMCNC: 32.3 G/DL (ref 31.5–36.5)
MCV RBC AUTO: 94 FL (ref 78–100)
NITRATE UR QL: NEGATIVE
PH UR STRIP: 5.5 [PH] (ref 5–7)
PLATELET # BLD AUTO: 117 10E3/UL (ref 150–450)
POTASSIUM BLD-SCNC: 5 MMOL/L (ref 3.4–5.3)
PROT SERPL-MCNC: 6.7 G/DL (ref 6.8–8.8)
PROT UR-MCNC: 1.94 G/L
PROT/CREAT 24H UR: 2.26 G/G CR (ref 0–0.2)
RBC # BLD AUTO: 4 10E6/UL (ref 4.4–5.9)
RBC URINE: 1 /HPF
SODIUM SERPL-SCNC: 138 MMOL/L (ref 133–144)
SP GR UR STRIP: 1.01 (ref 1–1.03)
TACROLIMUS BLD-MCNC: 5.3 UG/L (ref 5–15)
TME LAST DOSE: NORMAL H
TME LAST DOSE: NORMAL H
UROBILINOGEN UR STRIP-MCNC: NORMAL MG/DL
WBC # BLD AUTO: 7 10E3/UL (ref 4–11)
WBC URINE: 1 /HPF

## 2022-05-24 PROCEDURE — 84156 ASSAY OF PROTEIN URINE: CPT

## 2022-05-24 PROCEDURE — 36415 COLL VENOUS BLD VENIPUNCTURE: CPT

## 2022-05-24 PROCEDURE — 81003 URINALYSIS AUTO W/O SCOPE: CPT

## 2022-05-24 PROCEDURE — 82248 BILIRUBIN DIRECT: CPT

## 2022-05-24 PROCEDURE — 80053 COMPREHEN METABOLIC PANEL: CPT

## 2022-05-24 PROCEDURE — 80197 ASSAY OF TACROLIMUS: CPT

## 2022-05-24 PROCEDURE — 85027 COMPLETE CBC AUTOMATED: CPT

## 2022-05-27 ENCOUNTER — OFFICE VISIT (OUTPATIENT)
Dept: GASTROENTEROLOGY | Facility: CLINIC | Age: 57
End: 2022-05-27
Attending: INTERNAL MEDICINE
Payer: COMMERCIAL

## 2022-05-27 ENCOUNTER — OFFICE VISIT (OUTPATIENT)
Dept: NEPHROLOGY | Facility: CLINIC | Age: 57
End: 2022-05-27
Payer: COMMERCIAL

## 2022-05-27 VITALS
BODY MASS INDEX: 25.77 KG/M2 | HEART RATE: 82 BPM | OXYGEN SATURATION: 100 % | WEIGHT: 190 LBS | DIASTOLIC BLOOD PRESSURE: 81 MMHG | SYSTOLIC BLOOD PRESSURE: 162 MMHG

## 2022-05-27 VITALS
HEART RATE: 95 BPM | SYSTOLIC BLOOD PRESSURE: 189 MMHG | WEIGHT: 287 LBS | OXYGEN SATURATION: 100 % | DIASTOLIC BLOOD PRESSURE: 97 MMHG | BODY MASS INDEX: 38.92 KG/M2

## 2022-05-27 DIAGNOSIS — E87.5 HYPERKALEMIA: ICD-10-CM

## 2022-05-27 DIAGNOSIS — I10 BENIGN ESSENTIAL HYPERTENSION: ICD-10-CM

## 2022-05-27 DIAGNOSIS — N18.30 STAGE 3 CHRONIC KIDNEY DISEASE, UNSPECIFIED WHETHER STAGE 3A OR 3B CKD (H): Primary | ICD-10-CM

## 2022-05-27 DIAGNOSIS — Z94.4 LIVER REPLACED BY TRANSPLANT (H): Primary | ICD-10-CM

## 2022-05-27 DIAGNOSIS — N18.31 STAGE 3A CHRONIC KIDNEY DISEASE (H): ICD-10-CM

## 2022-05-27 DIAGNOSIS — R80.1 PERSISTENT PROTEINURIA: ICD-10-CM

## 2022-05-27 DIAGNOSIS — J30.9 ALLERGIC RHINITIS, UNSPECIFIED SEASONALITY, UNSPECIFIED TRIGGER: ICD-10-CM

## 2022-05-27 PROCEDURE — 99214 OFFICE O/P EST MOD 30 MIN: CPT | Performed by: INTERNAL MEDICINE

## 2022-05-27 PROCEDURE — 99214 OFFICE O/P EST MOD 30 MIN: CPT

## 2022-05-27 PROCEDURE — G0463 HOSPITAL OUTPT CLINIC VISIT: HCPCS

## 2022-05-27 RX ORDER — LOSARTAN POTASSIUM 25 MG/1
25 TABLET ORAL DAILY
Qty: 90 TABLET | Refills: 3 | Status: SHIPPED | OUTPATIENT
Start: 2022-05-27 | End: 2023-08-11

## 2022-05-27 RX ORDER — FUROSEMIDE 40 MG
40 TABLET ORAL 2 TIMES DAILY
Qty: 180 TABLET | Refills: 3 | Status: SHIPPED | OUTPATIENT
Start: 2022-05-27 | End: 2022-09-08

## 2022-05-27 RX ORDER — FLUTICASONE PROPIONATE 50 MCG
1 SPRAY, SUSPENSION (ML) NASAL DAILY
Qty: 20 ML | Refills: 3 | Status: SHIPPED | OUTPATIENT
Start: 2022-05-27 | End: 2024-01-10

## 2022-05-27 ASSESSMENT — PAIN SCALES - GENERAL: PAINLEVEL: SEVERE PAIN (7)

## 2022-05-27 NOTE — LETTER
5/27/2022         RE: Camacho Bhagat  [address]  [facility] [id number]        Dear Colleague,    Thank you for referring your patient, Camacho Bhagat, to the [facility] [place]. Please see a copy of my visit note below.    HISTORY OF PRESENT ILLNESS:  I had the pleasure of seeing Camacho Bhagat for followup in the Liver Transplant Clinic at the [facility] on 05/27/2022.  Mr. Bhagat returns for followup now 5 years status post liver transplantation.    He is doing fairly well at this point in time.  He denies any abdominal pain, itching or skin rash or fatigue.  He denies any increased abdominal girth or lower extremity edema.    He denies any fevers or chills, cough or shortness of breath.  He is fully vaccinated against COVID-19 and has also received Evusheld.  He denies any nausea or vomiting, diarrhea or constipation.  His appetite has been good and unfortunately, his weight is up.    He has gone back on his CPAP machine because of sleep apnea.    Current Outpatient Medications   Medication     alcohol swab prep pads     alpha-lipoic acid 600 MG capsule     amLODIPine (NORVASC) 10 MG tablet     blood glucose (NO BRAND SPECIFIED) lancets standard     blood glucose (NO BRAND SPECIFIED) test strip     blood glucose (NO BRAND SPECIFIED) test strip     blood glucose calibration (NO BRAND SPECIFIED) solution     blood glucose monitoring (NO BRAND SPECIFIED) meter device kit     Blood Glucose Monitoring Suppl (CONTOUR BLOOD GLUCOSE SYSTEM) w/Device KIT     cholecalciferol (VITAMIN D3) 1000 UNIT tablet     CIALIS 5 MG tablet     Continuous Blood Gluc Sensor (DEXCOM G6 SENSOR) MISC     Continuous Blood Gluc Transmit (DEXCOM G6 TRANSMITTER) MISC     cyclobenzaprine (FLEXERIL) 10 MG tablet     fluticasone (FLONASE) 50 MCG/ACT nasal spray     furosemide (LASIX) 40 MG tablet     gabapentin (NEURONTIN) 300 MG capsule     Glucose Blood (BLOOD GLUCOSE TEST STRIPS)  STRP     Insulin Disposable Pump (OMNIPOD DASH 5 PACK PODS) MISC     Insulin Disposable Pump (OMNIPOD DASH 5 PACK PODS) MISC     Insulin Lispro (HUMALOG KWIKPEN) 200 UNIT/ML soln     insulin pen needle (BD ENE U/F) 32G X 4 MM miscellaneous     lidocaine (LIDODERM) 5 % patch     metoprolol succinate ER (TOPROL-XL) 200 MG 24 hr tablet     multivitamin w/minerals (THERA-VIT-M) tablet     mycophenolic acid (GENERIC EQUIVALENT) 360 MG EC tablet     nicotine (NICODERM CQ) 14 MG/24HR 24 hr patch     oxyCODONE (ROXICODONE) 5 MG tablet     patiromer (VELTASSA) 8.4 g packet     predniSONE (DELTASONE) 2.5 MG tablet     sildenafil (REVATIO) 20 MG tablet     tacrolimus (GENERIC EQUIVALENT) 1 MG capsule     thin (NO BRAND SPECIFIED) lancets     ursodiol (ACTIGALL) 500 MG tablet     varenicline (CHANTIX KEVEN) 0.5 MG X 11 & 1 MG X 42 tablet     No current facility-administered medications for this visit.     BP (!) 189/97   Pulse 95   Wt 130.2 kg (287 lb)   SpO2 100%   BMI 38.92 kg/m      PHYSICAL EXAMINATION:    GENERAL:  He looks well.  HEENT:  Shows no scleral icterus or temporal muscle wasting.  CHEST:  Clear.  ABDOMEN:  No increase in girth.  No masses or tenderness to palpation are present.  His liver is 10 cm in span without left lobe enlargement.  No spleen tip is palpable.    EXTREMITIES:  No edema.  SKIN:  No stigmata of chronic liver disease.  NEUROLOGIC:  Nonfocal.    Recent Results (from the past 168 hour(s))   UA reflex to Microscopic    Collection Time: 05/24/22 10:35 AM   Result Value Ref Range    Color Urine Light Yellow Colorless, Straw, Light Yellow, Yellow    Appearance Urine Clear Clear    Glucose Urine Negative Negative mg/dL    Bilirubin Urine Negative Negative    Ketones Urine Negative Negative mg/dL    Specific Gravity Urine 1.014 1.003 - 1.035    Blood Urine Trace (A) Negative    pH Urine 5.5 5.0 - 7.0    Protein Albumin Urine 100  (A) Negative mg/dL    Urobilinogen Urine Normal Normal, 2.0 mg/dL     Nitrite Urine Negative Negative    Leukocyte Esterase Urine Negative Negative    Bacteria Urine Few (A) None Seen /HPF    RBC Urine 1 <=2 /HPF    WBC Urine 1 <=5 /HPF    Hyaline Casts Urine 1 <=2 /LPF   Protein  random urine    Collection Time: 05/24/22 10:35 AM   Result Value Ref Range    Total Protein Random Urine g/L 1.94 g/L    Total Protein Urine g/gr Creatinine 2.26 (H) 0.00 - 0.20 g/g Cr    Creatinine Urine mg/dL 86 mg/dL   Basic metabolic panel    Collection Time: 05/24/22 10:41 AM   Result Value Ref Range    Sodium 138 133 - 144 mmol/L    Potassium 5.0 3.4 - 5.3 mmol/L    Chloride 112 (H) 94 - 109 mmol/L    Carbon Dioxide (CO2) 24 20 - 32 mmol/L    Anion Gap 2 (L) 3 - 14 mmol/L    Urea Nitrogen 36 (H) 7 - 30 mg/dL    Creatinine 1.44 (H) 0.66 - 1.25 mg/dL    Calcium 8.7 8.5 - 10.1 mg/dL    Glucose 101 (H) 70 - 99 mg/dL    GFR Estimate 57 (L) >60 mL/min/1.73m2   CBC with platelets    Collection Time: 05/24/22 10:41 AM   Result Value Ref Range    WBC Count 7.0 4.0 - 11.0 10e3/uL    RBC Count 4.00 (L) 4.40 - 5.90 10e6/uL    Hemoglobin 12.1 (L) 13.3 - 17.7 g/dL    Hematocrit 37.5 (L) 40.0 - 53.0 %    MCV 94 78 - 100 fL    MCH 30.3 26.5 - 33.0 pg    MCHC 32.3 31.5 - 36.5 g/dL    RDW 13.7 10.0 - 15.0 %    Platelet Count 117 (L) 150 - 450 10e3/uL   Hepatic panel    Collection Time: 05/24/22 10:41 AM   Result Value Ref Range    Bilirubin Total 0.7 0.2 - 1.3 mg/dL    Bilirubin Direct <0.1 0.0 - 0.2 mg/dL    Protein Total 6.7 (L) 6.8 - 8.8 g/dL    Albumin 3.4 3.4 - 5.0 g/dL    Alkaline Phosphatase 203 (H) 40 - 150 U/L    AST 25 0 - 45 U/L    ALT 31 0 - 70 U/L   Tacrolimus by Tandem Mass Spectrometry    Collection Time: 05/24/22 10:41 AM   Result Value Ref Range    Tacrolimus by Tandem Mass Spectrometry 5.3 5.0 - 15.0 ug/L    Tacrolimus Last Dose Date 5/23/2022     Tacrolimus Last Dose Time 11:45 PM       IMPRESSION:  Mr. Bhagat is more than 5 years status post liver transplantation.  All complications are currently  fairly well addressed.  My only concern is that his protein in his urine is increasing.  He is going to see Jovanna Cazares today.  I really wonder whether he would benefit from a biopsy to better understand why the protein in the urine has continued to creep up.  I otherwise will not be making any other change to his medical regimen.  I will see him back in the clinic again in 6 months.  He is due for a screening colonoscopy, which he will schedule once he passes his probation period for his work.    Thank you very much for allowing me to participate in the care of this patient.  If you have any questions regarding my recommendations, please do not hesitate to contact me.         Toñito Camejo MD      Professor of Medicine  [employer]      Executive Medical Director, Solid Organ Transplant Program  [facility]

## 2022-05-27 NOTE — LETTER
5/27/2022       RE: Camacho Bhagat  [address]  [facility] [address]     Dear Colleague,    Thank you for referring your patient, Camacho Bhagat, to the [facility] [place] [facility]. Please see a copy of my visit note below.    Nephrology Clinic Visit 5/27/22    Assessment and Plan:       1. CKD3a w/mild proteinuria - Stable. Creat 1.4, eGFR 57 ml/mn.  UPCR has jumped to 2.2 g/g/Cr from previous 0.7 g/gCr.   UA with trace blood. Blood pressures uncontrolled   Baseline remains in the low to mid 1 range since 2019.    Etiology of CKD likely multifactorial; DM, recurrent EJ, CNI.    - Had been intolerant of ACE/ARB previously given problems with hyperkalemia assoc with Tacrolimus, but retrial when TAC level was <0.3 did not result in Hyperkalemia. In fact Valtessa had been discontinued because he was becoming hypokalemic.    - Patient was restarted on ARB in 4/18 for unexplained nephrotic range proteinuria following ostomy takedown with improvement in UPCR, but then developed mild acute rejection and Tac dose was increased resulting in hyperkalemia. He was restarted on Valtassa with improvement in hyperkalemia and Losartan was discontinued again in 3/19   - We discussed retrial of Losartan last visit but patient preferred to hold off but now with increase in his proteinuria is ready to proceed. He is willing to restart Valtessa as well.    - Discussed that if proteinuria does not improve with ARB/improved blood pressure control, goal glycemic control, then renal bx may be indicated. 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 Would like to see more in the 120-130/ range     - Does not use NSAIDs.    - A1c goal is 7%. HbA1c 5.2 % (3/22)       2. HTN - Still not at goal. Home blood pressures in the 140's/. Clinic readings 130-160/. No edema/dyspnea. Continues to gain weight.  Just started CPAP   Current antihypertensive regimen:      Lasix 40 mg bid     Amlodipine 10 mg qd      Toprol  mg every day         - Restart Losartan 25 mg every day       - Labs 2 wks       - May also see some improvement with CPAP       - Strongly encouraged weight loss       - Will send in b/p readings in 1-2 wks       - Ideally would like to maximize his ARB dose and decrease either Amlodipine or Metoprolol      3. Hyperkalemia assoc with Tac/ARB - K 5.0. He stopped his Valtessa 1 wk ago to see if he still needs it. Given that we are restarting Losartan he will restart Valtessa. .    - Restart Valtassa 8.4 g/day.    - He tries to modify his diet w/respect to K foods      4. Volume status - No edema/dyspnea. Weight 287#, up from 284#, 250#. Was 242# in 12/20. His weight gain is not assoc with hypervolemia. Blood pressures borderline   - Continue Lasix 40 mg bid.    - Work on weight loss      5. Acid base - No acute concerns. Metabolic acidosis resolved following ileostomy takedown. Bicarb 24      6. BMD - Ca 8.7, Phos 3.1, albumin 3.4   - Vitamin D 41, PTH 28 (11/21)    - Continue Vit D 1000 U qd      7.Anemia -Hgb 12.1.    - Iron studies 5/21: Ferritin 935, Fe 69, IS 26   - Had colonoscopy 2017 (poor study), Ileoscopy 8/17 - nml   - Not on iron and has not needed DIPAK      8. Liver transplant IS: Tacrolimus, MMF, Pred. Tac level 5.3   - Per transplant      9. Dispo- RCT 3 months for f/u. Asked that he work on weight loss.       Reason for Visit:  CKD3a/HTN follow up        HPI:  Mr Bhagat is a 55 yo male with CKD3a, HTN, Chronic hyperkalemia, DM2, Liver transplant, present today for CKD/HTN followup. Last seen in clinic by me on 12/3/21.  Baseline creat low to mid 1's.       Interval Hx:     No hospital admissions.       ROS:  Camacho reports that his home blood pressures have been in the upper 140's/  He also notes that he stopped Valtessa one week ago to see how his K was doing  Starting new job next  month  Recently diagnosed with RONNIE and has been using his CPAP for one week with improvement in his fatigue already  No improvement in weight gain. He attributes to no regular exercise due to hip/knee pain. He is anticipating with getting back to work he will shed a few pounds  DM control improved with Insulin pump.    Appetite good  Energy level improved after starting CPAP  No edema.   Denies dyspnea,  CP, abdominal pain. Voiding w/o difficulty.   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 Former smoker,   by profession. Exercise is limited due to hip/knee pain.   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       Family Hx:   Family History   Problem Relation Age of Onset     Diabetes Father      Hypertension Father      Substance Abuse Father      Arthritis Mother      Thyroid Cancer Mother         Survivor!     Cervical Cancer Maternal Grandmother      Cerebrovascular Disease Maternal Grandfather      No Known Problems Paternal Grandmother      Prostate Cancer Paternal Grandfather      Colon Cancer No family hx of      Hyperlipidemia No family hx of      Coronary Artery Disease No family hx of      Breast Cancer No family hx of        Allergies:  Allergies   Allergen Reactions     Erythromycin GI Disturbance     Losartan Other (See Comments)     Consistently develops hyperkalemia     Vioxx      Nausea, vomiting       Medications:  Current Outpatient Medications   Medication Sig     fluticasone (FLONASE) 50 MCG/ACT nasal spray Spray 1 spray into both nostrils daily      furosemide (LASIX) 40 MG tablet Take 1 tablet (40 mg) by mouth 2 times daily     losartan (COZAAR) 25 MG tablet Take 1 tablet (25 mg) by mouth daily     alcohol swab prep pads Use to swab area of injection/francisca as directed.     alpha-lipoic acid 600 MG capsule Take 600 mg by mouth     amLODIPine (NORVASC) 10 MG tablet Take 1 tablet (10 mg) by mouth daily     blood glucose (NO BRAND SPECIFIED) lancets standard Use to test blood sugar 1 times daily or as directed.     blood glucose (NO BRAND SPECIFIED) test strip Use to test blood sugar 1 times daily or as directed.     blood glucose (NO BRAND SPECIFIED) test strip Use to test blood sugar 3 times daily or as directed. Any covered brand preferred by Pt that works with meter.     blood glucose calibration (NO BRAND SPECIFIED) solution Use to calibrate meter.     blood glucose monitoring (NO BRAND SPECIFIED) meter device kit Use to test blood sugar 3 times daily or as directed. Any covered brand preferred by Pt.     Blood Glucose Monitoring Suppl (CONTOUR BLOOD GLUCOSE SYSTEM) w/Device KIT For use with the Omnipod Dash insulin pump     cholecalciferol (VITAMIN D3) 1000 UNIT tablet Take 1 tablet (1,000 Units) by mouth daily (Patient taking differently: Take 1,000 Units by mouth every morning)     CIALIS 5 MG tablet Take 5 mg by mouth as needed      Continuous Blood Gluc Sensor (DEXCOM G6 SENSOR) MISC Change every 10 days.     Continuous Blood Gluc Transmit (DEXCOM G6 TRANSMITTER) MISC Change every 3 months.     cyclobenzaprine (FLEXERIL) 10 MG tablet Take 1 tablet (10 mg) by mouth 3 times daily as needed for muscle spasms     fluticasone (FLONASE) 50 MCG/ACT nasal spray Spray 1 spray into both nostrils daily     gabapentin (NEURONTIN) 300 MG capsule Take 2 capsules (600 mg) by mouth 3 times daily     Glucose Blood (BLOOD GLUCOSE TEST STRIPS) STRP 1 strip by Lancet route 3 times daily     Insulin Disposable Pump (OMNIPOD DASH 5 PACK PODS) MISC 1 each every 48 hours      Insulin Disposable Pump (OMNIPOD DASH 5 PACK PODS) MISC 1 each every 3 days     Insulin Lispro (HUMALOG KWIKPEN) 200 UNIT/ML soln To be used in the Ominpod pump. Total daily dose up to 90 U.     insulin pen needle (BD ENE U/F) 32G X 4 MM miscellaneous Use 1 pen needle 4 times daily or as directed.     lidocaine (LIDODERM) 5 % patch Place 1 patch onto the skin every 24 hours To prevent lidocaine toxicity, patient should be patch free for 12 hrs daily.     metoprolol succinate ER (TOPROL-XL) 200 MG 24 hr tablet Take 1 tablet (200 mg) by mouth daily     multivitamin w/minerals (THERA-VIT-M) tablet Take 1 tablet by mouth every morning     mycophenolic acid (GENERIC EQUIVALENT) 360 MG EC tablet TAKE 2 TABLETS (720 MG) BY MOUTH EVERY 12 HOURS     nicotine (NICODERM CQ) 14 MG/24HR 24 hr patch Place 1 patch onto the skin every 24 hours     oxyCODONE (ROXICODONE) 5 MG tablet Take 1 tablet (5 mg) by mouth 4 times daily as needed for pain maximum 6 tablet(s) per day     patiromer (VELTASSA) 8.4 g packet Take 8.4 g by mouth daily     predniSONE (DELTASONE) 2.5 MG tablet TAKE 1 TABLET BY MOUTH EVERY DAY     sildenafil (REVATIO) 20 MG tablet Take 2 tablets (40 mg) by mouth daily as needed (erectile dysfunction)     tacrolimus (GENERIC EQUIVALENT) 1 MG capsule Take 4 capsules (4 mg) by mouth every morning AND 3 capsules (3 mg) every evening.     thin (NO BRAND SPECIFIED) lancets Use with lanceting device. Any covered brand.     ursodiol (ACTIGALL) 500 MG tablet TAKE 1 TABLET BY MOUTH TWICE A DAY     varenicline (CHANTIX KEVEN) 0.5 MG X 11 & 1 MG X 42 tablet Take 0.5 mg tab daily for 3 days, THEN 0.5 mg tab twice daily for 4 days, THEN 1 mg twice daily.     No current facility-administered medications for this visit.      Vitals:  Wt 287#  [id number] P 82    Exam:  GEN: Obese, pleasant male in NAD  CARDIAC: RRR  LUNGS: CTA  ABDOMEN: Obese  EXT: No edema  NEURO a/O    LABS:   CMP  Recent Labs   Lab Test 05/24/22  1041  03/03/22  1126 12/10/21  1358 12/01/21  1359 08/18/21  1236 06/08/21  1628 05/27/21  1729 03/31/21  1145 02/12/21  1331    138 139 140   < > 134 131* 138 138   POTASSIUM 5.0 4.4 5.2 5.4*   < > 4.9 5.1 4.5 5.8*   CHLORIDE 112* 108 111* 115*   < > 105 102 109 111*   CO2 24 24 24 21   < > 23 23 22 22   ANIONGAP 2* 6 4 4   < > 6 6 7 5   * 126* 65* 85   < > 249* 335* 134* 251*   BUN 36* 48* 39* 39*   < > 43* 50* 54* 44*   CR 1.44* 1.66* 1.46* 1.48*   < > 1.51* 1.53* 1.56* 1.32*   GFRESTIMATED 57* 48* 53* 52*   < > 51* 50* 49* 60*   GFRESTBLACK  --   --   --   --   --  59* 58* 57* 69   JACOB 8.7 8.9 9.0 8.7   < > 8.9 9.1 8.9 8.7    < > = values in this interval not displayed.     Recent Labs   Lab Test 05/24/22  1041 03/03/22  1126 12/10/21  1358 12/01/21  1359   BILITOTAL 0.7 0.7 0.6 0.7   ALKPHOS 203* 215* 228* 242*   ALT 31 34 29 38   AST 25 22 20 21     CBC  Recent Labs   Lab Test 05/24/22  1041 03/03/22  1126 12/01/21  1359 08/18/21  1236   HGB 12.1* 13.3 12.9* 11.6*   WBC 7.0 8.3 6.2 6.0   RBC 4.00* 4.39* 4.23* 3.71*   HCT 37.5* 39.7* 40.0 35.4*   MCV 94 90 95 95   MCH 30.3 30.3 30.5 31.3   MCHC 32.3 33.5 32.3 32.8   RDW 13.7 13.4 13.3 13.7   * 135* 122* 106*     URINE STUDIES  Recent Labs   Lab Test 05/24/22  1035 11/24/20  1325 04/02/20  1230 03/27/19  1155   COLOR Light Yellow Yellow Light Yellow Yellow   APPEARANCE Clear Clear Clear Clear   URINEGLC Negative Negative Negative 70*   URINEBILI Negative Negative Negative Negative   URINEKETONE Negative Negative Negative Negative   SG 1.014 1.014 1.014 1.014   UBLD Trace* Negative Negative Negative   URINEPH 5.5 5.0 5.0 5.0   PROTEIN 100 * 20* 10* 30*   NITRITE Negative Negative Negative Negative   LEUKEST Negative Negative Negative Negative   RBCU 1 1 <1 1   WBCU 1 <1 <1 0     Recent Labs   Lab Test 05/24/22  1035 03/03/22  1145 12/01/21  1337 05/27/21  1720   UTPG 2.26* 1.58* 0.76* 0.56*     PTH  Recent Labs   Lab Test 12/01/21  1359  11/24/20  1345 08/06/19  1234   PTHI 28 29 22     IRON STUDIES  Recent Labs   Lab Test 05/27/21  1729 11/24/20  1345 08/21/20  1510   IRON 69 105 123    249 235*   IRONSAT 26 42 52*   NELY 935* 1,104* 1,098*             Cinda Cazares, NP

## 2022-05-27 NOTE — PROGRESS NOTES
Nephrology Clinic Visit 5/27/22    Assessment and Plan:       1. CKD3a w/mild proteinuria - Stable. Creat 1.4, eGFR 57 ml/mn.  UPCR has jumped to 2.2 g/g/Cr from previous 0.7 g/gCr.   UA with trace blood. Blood pressures uncontrolled   Baseline remains in the low to mid 1 range since 2019.    Etiology of CKD likely multifactorial; DM, recurrent JE, CNI.    - Had been intolerant of ACE/ARB previously given problems with hyperkalemia assoc with Tacrolimus, but retrial when TAC level was <0.3 did not result in Hyperkalemia. In fact Valtessa had been discontinued because he was becoming hypokalemic.    - Patient was restarted on ARB in 4/18 for unexplained nephrotic range proteinuria following ostomy takedown with improvement in UPCR, but then developed mild acute rejection and Tac dose was increased resulting in hyperkalemia. He was restarted on Valtassa with improvement in hyperkalemia and Losartan was discontinued again in 3/19   - We discussed retrial of Losartan last visit but patient preferred to hold off but now with increase in his proteinuria is ready to proceed. He is willing to restart Valtessa as well.    - Discussed that if proteinuria does not improve with ARB/improved blood pressure control, goal glycemic control, then renal bx may be indicated. 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 Would like to see more in the 120-130/ range     - Does not use NSAIDs.    - A1c goal is 7%. HbA1c 5.2 % (3/22)       2. HTN - Still not at goal. Home blood pressures in the 140's/. Clinic readings 130-160/. No edema/dyspnea. Continues to gain weight. Just started CPAP   Current antihypertensive regimen:      Lasix 40 mg bid     Amlodipine 10 mg qd      Toprol  mg every day         - Restart Losartan 25 mg every day       - Labs 2 wks       - May also see some improvement with CPAP       - Strongly encouraged weight loss       - Will send in b/p readings  in 1-2 wks       - Ideally would like to maximize his ARB dose and decrease either Amlodipine or Metoprolol      3. Hyperkalemia assoc with Tac/ARB - K 5.0. He stopped his Valtessa 1 wk ago to see if he still needs it. Given that we are restarting Losartan he will restart Valtessa. .    - Restart Valtassa 8.4 g/day.    - He tries to modify his diet w/respect to K foods      4. Volume status - No edema/dyspnea. Weight 287#, up from 284#, 250#. Was 242# in 12/20. His weight gain is not assoc with hypervolemia. Blood pressures borderline   - Continue Lasix 40 mg bid.    - Work on weight loss      5. Acid base - No acute concerns. Metabolic acidosis resolved following ileostomy takedown. Bicarb 24      6. BMD - Ca 8.7, Phos 3.1, albumin 3.4   - Vitamin D 41, PTH 28 (11/21)    - Continue Vit D 1000 U qd      7.Anemia -Hgb 12.1.    - Iron studies 5/21: Ferritin 935, Fe 69, IS 26   - Had colonoscopy 2017 (poor study), Ileoscopy 8/17 - nml   - Not on iron and has not needed DIPAK      8. Liver transplant IS: Tacrolimus, MMF, Pred. Tac level 5.3   - Per transplant      9. Dispo- RCT 3 months for f/u. Asked that he work on weight loss.       Reason for Visit:  CKD3a/HTN follow up        HPI:  Mr Bhagat is a 55 yo male with CKD3a, HTN, Chronic hyperkalemia, DM2, Liver transplant, present today for CKD/HTN followup. Last seen in clinic by me on 12/3/21.  Baseline creat low to mid 1's.       Interval Hx:     No hospital admissions.       ROS:  Camacho reports that his home blood pressures have been in the upper 140's/  He also notes that he stopped Valtessa one week ago to see how his K was doing  Starting new job next month  Recently diagnosed with RONNIE and has been using his CPAP for one week with improvement in his fatigue already  No improvement in weight gain. He attributes to no regular exercise due to hip/knee pain. He is anticipating with getting back to work he will shed a few pounds  DM control improved with Insulin pump.     Appetite good  Energy level improved after starting CPAP  No edema.   Denies dyspnea,  CP, abdominal pain. Voiding w/o difficulty.   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 Former smoker,   by profession. Exercise is limited due to hip/knee pain.   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       Family Hx:   Family History   Problem Relation Age of Onset     Diabetes Father      Hypertension Father      Substance Abuse Father      Arthritis Mother      Thyroid Cancer Mother         Survivor!     Cervical Cancer Maternal Grandmother      Cerebrovascular Disease Maternal Grandfather      No Known Problems Paternal Grandmother      Prostate Cancer Paternal Grandfather      Colon Cancer No family hx of      Hyperlipidemia No family hx of      Coronary Artery Disease No family hx of      Breast Cancer No family hx of        Allergies:  Allergies   Allergen Reactions     Erythromycin GI Disturbance     Losartan Other (See Comments)     Consistently develops hyperkalemia     Vioxx      Nausea, vomiting       Medications:  Current Outpatient Medications   Medication Sig     fluticasone (FLONASE) 50 MCG/ACT nasal spray Spray 1 spray into both nostrils daily     furosemide (LASIX) 40 MG tablet Take 1 tablet (40 mg) by mouth 2 times daily     losartan (COZAAR) 25 MG tablet Take 1 tablet (25 mg) by mouth daily     alcohol swab prep pads Use to swab area of injection/francisca as directed.     alpha-lipoic acid 600 MG capsule Take 600 mg by mouth     amLODIPine (NORVASC) 10 MG tablet  Take 1 tablet (10 mg) by mouth daily     blood glucose (NO BRAND SPECIFIED) lancets standard Use to test blood sugar 1 times daily or as directed.     blood glucose (NO BRAND SPECIFIED) test strip Use to test blood sugar 1 times daily or as directed.     blood glucose (NO BRAND SPECIFIED) test strip Use to test blood sugar 3 times daily or as directed. Any covered brand preferred by Pt that works with meter.     blood glucose calibration (NO BRAND SPECIFIED) solution Use to calibrate meter.     blood glucose monitoring (NO BRAND SPECIFIED) meter device kit Use to test blood sugar 3 times daily or as directed. Any covered brand preferred by Pt.     Blood Glucose Monitoring Suppl (CONTOUR BLOOD GLUCOSE SYSTEM) w/Device KIT For use with the Omnipod Dash insulin pump     cholecalciferol (VITAMIN D3) 1000 UNIT tablet Take 1 tablet (1,000 Units) by mouth daily (Patient taking differently: Take 1,000 Units by mouth every morning)     CIALIS 5 MG tablet Take 5 mg by mouth as needed      Continuous Blood Gluc Sensor (DEXCOM G6 SENSOR) MISC Change every 10 days.     Continuous Blood Gluc Transmit (DEXCOM G6 TRANSMITTER) MISC Change every 3 months.     cyclobenzaprine (FLEXERIL) 10 MG tablet Take 1 tablet (10 mg) by mouth 3 times daily as needed for muscle spasms     fluticasone (FLONASE) 50 MCG/ACT nasal spray Spray 1 spray into both nostrils daily     gabapentin (NEURONTIN) 300 MG capsule Take 2 capsules (600 mg) by mouth 3 times daily     Glucose Blood (BLOOD GLUCOSE TEST STRIPS) STRP 1 strip by Lancet route 3 times daily     Insulin Disposable Pump (OMNIPOD DASH 5 PACK PODS) MISC 1 each every 48 hours     Insulin Disposable Pump (OMNIPOD DASH 5 PACK PODS) MISC 1 each every 3 days     Insulin Lispro (HUMALOG KWIKPEN) 200 UNIT/ML soln To be used in the Ominpod pump. Total daily dose up to 90 U.     insulin pen needle (BD ENE U/F) 32G X 4 MM miscellaneous Use 1 pen needle 4 times daily or as directed.     lidocaine  (LIDODERM) 5 % patch Place 1 patch onto the skin every 24 hours To prevent lidocaine toxicity, patient should be patch free for 12 hrs daily.     metoprolol succinate ER (TOPROL-XL) 200 MG 24 hr tablet Take 1 tablet (200 mg) by mouth daily     multivitamin w/minerals (THERA-VIT-M) tablet Take 1 tablet by mouth every morning     mycophenolic acid (GENERIC EQUIVALENT) 360 MG EC tablet TAKE 2 TABLETS (720 MG) BY MOUTH EVERY 12 HOURS     nicotine (NICODERM CQ) 14 MG/24HR 24 hr patch Place 1 patch onto the skin every 24 hours     oxyCODONE (ROXICODONE) 5 MG tablet Take 1 tablet (5 mg) by mouth 4 times daily as needed for pain maximum 6 tablet(s) per day     patiromer (VELTASSA) 8.4 g packet Take 8.4 g by mouth daily     predniSONE (DELTASONE) 2.5 MG tablet TAKE 1 TABLET BY MOUTH EVERY DAY     sildenafil (REVATIO) 20 MG tablet Take 2 tablets (40 mg) by mouth daily as needed (erectile dysfunction)     tacrolimus (GENERIC EQUIVALENT) 1 MG capsule Take 4 capsules (4 mg) by mouth every morning AND 3 capsules (3 mg) every evening.     thin (NO BRAND SPECIFIED) lancets Use with lanceting device. Any covered brand.     ursodiol (ACTIGALL) 500 MG tablet TAKE 1 TABLET BY MOUTH TWICE A DAY     varenicline (CHANTIX KEVEN) 0.5 MG X 11 & 1 MG X 42 tablet Take 0.5 mg tab daily for 3 days, THEN 0.5 mg tab twice daily for 4 days, THEN 1 mg twice daily.     No current facility-administered medications for this visit.      Vitals:  Wt 287#  [id number] P 82    Exam:  GEN: Obese, pleasant male in NAD  CARDIAC: RRR  LUNGS: CTA  ABDOMEN: Obese  EXT: No edema  NEURO a/O    LABS:   CMP  Recent Labs   Lab Test 05/24/22  1041 03/03/22  1126 12/10/21  1358 12/01/21  1359 08/18/21  1236 06/08/21  1628 05/27/21  1729 03/31/21  1145 02/12/21  1331    138 139 140   < > 134 131* 138 138   POTASSIUM 5.0 4.4 5.2 5.4*   < > 4.9 5.1 4.5 5.8*   CHLORIDE 112* 108 111* 115*   < > 105 102 109 111*   CO2 24 24 24 21   < > 23 23 22 22   ANIONGAP 2* 6 4 4    < > 6 6 7 5   * 126* 65* 85   < > 249* 335* 134* 251*   BUN 36* 48* 39* 39*   < > 43* 50* 54* 44*   CR 1.44* 1.66* 1.46* 1.48*   < > 1.51* 1.53* 1.56* 1.32*   GFRESTIMATED 57* 48* 53* 52*   < > 51* 50* 49* 60*   GFRESTBLACK  --   --   --   --   --  59* 58* 57* 69   JACOB 8.7 8.9 9.0 8.7   < > 8.9 9.1 8.9 8.7    < > = values in this interval not displayed.     Recent Labs   Lab Test 05/24/22  1041 03/03/22  1126 12/10/21  1358 12/01/21  1359   BILITOTAL 0.7 0.7 0.6 0.7   ALKPHOS 203* 215* 228* 242*   ALT 31 34 29 38   AST 25 22 20 21     CBC  Recent Labs   Lab Test 05/24/22  1041 03/03/22  1126 12/01/21  1359 08/18/21  1236   HGB 12.1* 13.3 12.9* 11.6*   WBC 7.0 8.3 6.2 6.0   RBC 4.00* 4.39* 4.23* 3.71*   HCT 37.5* 39.7* 40.0 35.4*   MCV 94 90 95 95   MCH 30.3 30.3 30.5 31.3   MCHC 32.3 33.5 32.3 32.8   RDW 13.7 13.4 13.3 13.7   * 135* 122* 106*     URINE STUDIES  Recent Labs   Lab Test 05/24/22  1035 11/24/20  1325 04/02/20  1230 03/27/19  1155   COLOR Light Yellow Yellow Light Yellow Yellow   APPEARANCE Clear Clear Clear Clear   URINEGLC Negative Negative Negative 70*   URINEBILI Negative Negative Negative Negative   URINEKETONE Negative Negative Negative Negative   SG 1.014 1.014 1.014 1.014   UBLD Trace* Negative Negative Negative   URINEPH 5.5 5.0 5.0 5.0   PROTEIN 100 * 20* 10* 30*   NITRITE Negative Negative Negative Negative   LEUKEST Negative Negative Negative Negative   RBCU 1 1 <1 1   WBCU 1 <1 <1 0     Recent Labs   Lab Test 05/24/22  1035 03/03/22  1145 12/01/21  1337 05/27/21  1720   UTPG 2.26* 1.58* 0.76* 0.56*     PTH  Recent Labs   Lab Test 12/01/21  1359 11/24/20  1345 08/06/19  1234   PTHI 28 29 22     IRON STUDIES  Recent Labs   Lab Test 05/27/21  1729 11/24/20  1345 08/21/20  1510   IRON 69 105 123    249 235*   IRONSAT 26 42 52*   NELY 935* 1,104* 1,098*       Cinda Cazares, NP

## 2022-05-27 NOTE — NURSING NOTE
Chief Complaint   Patient presents with     RECHECK     6 month follow up     Blood pressure (!) 189/97, pulse 95, weight 130.2 kg (287 lb), SpO2 100 %.    Sheyla Orellana MA

## 2022-05-27 NOTE — PROGRESS NOTES
HISTORY OF PRESENT ILLNESS:  I had the pleasure of seeing Camacho Bhagat for followup in the Liver Transplant Clinic at the [facility] on 05/27/2022.  Mr. Bhagat returns for followup now 5 years status post liver transplantation.    He is doing fairly well at this point in time.  He denies any abdominal pain, itching or skin rash or fatigue.  He denies any increased abdominal girth or lower extremity edema.    He denies any fevers or chills, cough or shortness of breath.  He is fully vaccinated against COVID-19 and has also received Evusheld.  He denies any nausea or vomiting, diarrhea or constipation.  His appetite has been good and unfortunately, his weight is up.    He has gone back on his CPAP machine because of sleep apnea.    Current Outpatient Medications   Medication     alcohol swab prep pads     alpha-lipoic acid 600 MG capsule     amLODIPine (NORVASC) 10 MG tablet     blood glucose (NO BRAND SPECIFIED) lancets standard     blood glucose (NO BRAND SPECIFIED) test strip     blood glucose (NO BRAND SPECIFIED) test strip     blood glucose calibration (NO BRAND SPECIFIED) solution     blood glucose monitoring (NO BRAND SPECIFIED) meter device kit     Blood Glucose Monitoring Suppl (CONTOUR BLOOD GLUCOSE SYSTEM) w/Device KIT     cholecalciferol (VITAMIN D3) 1000 UNIT tablet     CIALIS 5 MG tablet     Continuous Blood Gluc Sensor (DEXCOM G6 SENSOR) MISC     Continuous Blood Gluc Transmit (DEXCOM G6 TRANSMITTER) MISC     cyclobenzaprine (FLEXERIL) 10 MG tablet     fluticasone (FLONASE) 50 MCG/ACT nasal spray     furosemide (LASIX) 40 MG tablet     gabapentin (NEURONTIN) 300 MG capsule     Glucose Blood (BLOOD GLUCOSE TEST STRIPS) STRP     Insulin Disposable Pump (OMNIPOD DASH 5 PACK PODS) MISC     Insulin Disposable Pump (OMNIPOD DASH 5 PACK PODS) MISC     Insulin Lispro (HUMALOG KWIKPEN) 200 UNIT/ML soln     insulin pen needle (BD ENE U/F) 32G X 4 MM miscellaneous     lidocaine  (LIDODERM) 5 % patch     metoprolol succinate ER (TOPROL-XL) 200 MG 24 hr tablet     multivitamin w/minerals (THERA-VIT-M) tablet     mycophenolic acid (GENERIC EQUIVALENT) 360 MG EC tablet     nicotine (NICODERM CQ) 14 MG/24HR 24 hr patch     oxyCODONE (ROXICODONE) 5 MG tablet     patiromer (VELTASSA) 8.4 g packet     predniSONE (DELTASONE) 2.5 MG tablet     sildenafil (REVATIO) 20 MG tablet     tacrolimus (GENERIC EQUIVALENT) 1 MG capsule     thin (NO BRAND SPECIFIED) lancets     ursodiol (ACTIGALL) 500 MG tablet     varenicline (CHANTIX KEVEN) 0.5 MG X 11 & 1 MG X 42 tablet     No current facility-administered medications for this visit.     BP (!) 189/97   Pulse 95   Wt 130.2 kg (287 lb)   SpO2 100%   BMI 38.92 kg/m      PHYSICAL EXAMINATION:    GENERAL:  He looks well.  HEENT:  Shows no scleral icterus or temporal muscle wasting.  CHEST:  Clear.  ABDOMEN:  No increase in girth.  No masses or tenderness to palpation are present.  His liver is 10 cm in span without left lobe enlargement.  No spleen tip is palpable.    EXTREMITIES:  No edema.  SKIN:  No stigmata of chronic liver disease.  NEUROLOGIC:  Nonfocal.    Recent Results (from the past 168 hour(s))   UA reflex to Microscopic    Collection Time: 05/24/22 10:35 AM   Result Value Ref Range    Color Urine Light Yellow Colorless, Straw, Light Yellow, Yellow    Appearance Urine Clear Clear    Glucose Urine Negative Negative mg/dL    Bilirubin Urine Negative Negative    Ketones Urine Negative Negative mg/dL    Specific Gravity Urine 1.014 1.003 - 1.035    Blood Urine Trace (A) Negative    pH Urine 5.5 5.0 - 7.0    Protein Albumin Urine 100  (A) Negative mg/dL    Urobilinogen Urine Normal Normal, 2.0 mg/dL    Nitrite Urine Negative Negative    Leukocyte Esterase Urine Negative Negative    Bacteria Urine Few (A) None Seen /HPF    RBC Urine 1 <=2 /HPF    WBC Urine 1 <=5 /HPF    Hyaline Casts Urine 1 <=2 /LPF   Protein  random urine    Collection Time: 05/24/22  10:35 AM   Result Value Ref Range    Total Protein Random Urine g/L 1.94 g/L    Total Protein Urine g/gr Creatinine 2.26 (H) 0.00 - 0.20 g/g Cr    Creatinine Urine mg/dL 86 mg/dL   Basic metabolic panel    Collection Time: 05/24/22 10:41 AM   Result Value Ref Range    Sodium 138 133 - 144 mmol/L    Potassium 5.0 3.4 - 5.3 mmol/L    Chloride 112 (H) 94 - 109 mmol/L    Carbon Dioxide (CO2) 24 20 - 32 mmol/L    Anion Gap 2 (L) 3 - 14 mmol/L    Urea Nitrogen 36 (H) 7 - 30 mg/dL    Creatinine 1.44 (H) 0.66 - 1.25 mg/dL    Calcium 8.7 8.5 - 10.1 mg/dL    Glucose 101 (H) 70 - 99 mg/dL    GFR Estimate 57 (L) >60 mL/min/1.73m2   CBC with platelets    Collection Time: 05/24/22 10:41 AM   Result Value Ref Range    WBC Count 7.0 4.0 - 11.0 10e3/uL    RBC Count 4.00 (L) 4.40 - 5.90 10e6/uL    Hemoglobin 12.1 (L) 13.3 - 17.7 g/dL    Hematocrit 37.5 (L) 40.0 - 53.0 %    MCV 94 78 - 100 fL    MCH 30.3 26.5 - 33.0 pg    MCHC 32.3 31.5 - 36.5 g/dL    RDW 13.7 10.0 - 15.0 %    Platelet Count 117 (L) 150 - 450 10e3/uL   Hepatic panel    Collection Time: 05/24/22 10:41 AM   Result Value Ref Range    Bilirubin Total 0.7 0.2 - 1.3 mg/dL    Bilirubin Direct <0.1 0.0 - 0.2 mg/dL    Protein Total 6.7 (L) 6.8 - 8.8 g/dL    Albumin 3.4 3.4 - 5.0 g/dL    Alkaline Phosphatase 203 (H) 40 - 150 U/L    AST 25 0 - 45 U/L    ALT 31 0 - 70 U/L   Tacrolimus by Tandem Mass Spectrometry    Collection Time: 05/24/22 10:41 AM   Result Value Ref Range    Tacrolimus by Tandem Mass Spectrometry 5.3 5.0 - 15.0 ug/L    Tacrolimus Last Dose Date 5/23/2022     Tacrolimus Last Dose Time 11:45 PM       IMPRESSION:  Mr. Bhagat is more than 5 years status post liver transplantation.  All complications are currently fairly well addressed.  My only concern is that his protein in his urine is increasing.  He is going to see Jovanna Cazares today.  I really wonder whether he would benefit from a biopsy to better understand why the protein in the urine has continued to creep  up.  I otherwise will not be making any other change to his medical regimen.  I will see him back in the clinic again in 6 months.  He is due for a screening colonoscopy, which he will schedule once he passes his probation period for his work.    Thank you very much for allowing me to participate in the care of this patient.  If you have any questions regarding my recommendations, please do not hesitate to contact me.         Toñito Camejo MD      Professor of Medicine  [employer]      Executive Medical Director, Solid Organ Transplant Program  [facility]

## 2022-06-01 ENCOUNTER — LAB REQUISITION (OUTPATIENT)
Dept: LAB | Facility: CLINIC | Age: 57
End: 2022-06-01

## 2022-06-01 PROCEDURE — 86481 TB AG RESPONSE T-CELL SUSP: CPT | Performed by: INTERNAL MEDICINE

## 2022-06-01 PROCEDURE — 86787 VARICELLA-ZOSTER ANTIBODY: CPT | Performed by: INTERNAL MEDICINE

## 2022-06-03 LAB
GAMMA INTERFERON BACKGROUND BLD IA-ACNC: 0.04 IU/ML
M TB IFN-G BLD-IMP: POSITIVE
M TB IFN-G CD4+ BCKGRND COR BLD-ACNC: 9.96 IU/ML
MITOGEN IGNF BCKGRD COR BLD-ACNC: 0 IU/ML
MITOGEN IGNF BCKGRD COR BLD-ACNC: 0.53 IU/ML
QUANTIFERON MITOGEN: 10 IU/ML
QUANTIFERON NIL TUBE: 0.04 IU/ML
QUANTIFERON TB1 TUBE: 0.04 IU/ML
QUANTIFERON TB2 TUBE: 0.57

## 2022-06-05 LAB
VZV IGG SER QL IA: 2753 INDEX
VZV IGG SER QL IA: POSITIVE

## 2022-06-06 ENCOUNTER — OFFICE VISIT (OUTPATIENT)
Dept: INTERNAL MEDICINE | Facility: CLINIC | Age: 57
End: 2022-06-06
Payer: COMMERCIAL

## 2022-06-06 ENCOUNTER — LAB REQUISITION (OUTPATIENT)
Dept: LAB | Facility: CLINIC | Age: 57
End: 2022-06-06

## 2022-06-06 VITALS
SYSTOLIC BLOOD PRESSURE: 176 MMHG | WEIGHT: 287 LBS | BODY MASS INDEX: 38.87 KG/M2 | DIASTOLIC BLOOD PRESSURE: 83 MMHG | OXYGEN SATURATION: 97 % | HEART RATE: 75 BPM | HEIGHT: 72 IN | RESPIRATION RATE: 16 BRPM

## 2022-06-06 DIAGNOSIS — N18.31 STAGE 3A CHRONIC KIDNEY DISEASE (H): ICD-10-CM

## 2022-06-06 DIAGNOSIS — E11.42 DIABETIC POLYNEUROPATHY ASSOCIATED WITH TYPE 2 DIABETES MELLITUS (H): Primary | ICD-10-CM

## 2022-06-06 DIAGNOSIS — Z94.4 S/P LIVER TRANSPLANT (H): ICD-10-CM

## 2022-06-06 DIAGNOSIS — I15.1 HYPERTENSION SECONDARY TO OTHER RENAL DISORDERS: ICD-10-CM

## 2022-06-06 PROCEDURE — 86481 TB AG RESPONSE T-CELL SUSP: CPT | Performed by: INTERNAL MEDICINE

## 2022-06-06 PROCEDURE — 99214 OFFICE O/P EST MOD 30 MIN: CPT | Performed by: INTERNAL MEDICINE

## 2022-06-06 RX ORDER — GABAPENTIN 300 MG/1
600 CAPSULE ORAL 3 TIMES DAILY
Qty: 540 CAPSULE | Refills: 3 | Status: SHIPPED | OUTPATIENT
Start: 2022-06-06 | End: 2023-05-15 | Stop reason: DRUGHIGH

## 2022-06-06 NOTE — NURSING NOTE
Camacho Bhagat is a 56 year old male patient that presents today in clinic for the following:    Chief Complaint   Patient presents with     RECHECK     Pt comes into clinic for a follow up     The patient's allergies and medications were reviewed as noted. A set of vitals were recorded as noted without incident. The patient does not have any other questions for the provider.    Eli Olmstead, EMT at 10:19 AM on 6/6/2022  
No

## 2022-06-06 NOTE — PROGRESS NOTES
ASSESSMENT/PLAN:  He is here for a review of multiple conditions.    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 Much better glucoses recently. On gabapentin 600mg TID (GFR was 57 so this is in a good range) and alpha lipoic acid. He will get an eye exam after he gets insurance.     Chewing tobacco - I had started him on Chantix and nicotine back in July 2021 - he had to stop the Chantix because of vivid dreams. He has cut back on the tobacco (from 1 tin per day to 1/4-1/2 per day)     Low back pain - he likely has a lumbar radiculopathy given the location of the pain and the radiation of the pain.  On occasional oxycodone and uses non-invasive pain treatments such as heat and ice, lidocaine patch may help when he has a bad exacerbation.  I did prescribe the lidocaine patch and also refilled his cyclobenzaprine and oxycodone.     Liver transplant after CASTAÑEDA and hepatocellular carcinoma - on tacrolimus, MMF, prednisone, ursodiol. Seen 12/3/21 and told he needs to see dermatology.     Hypertension - on metoprolol, amlodipine, and furosemide. Nephrology note mentions possible hydralazine as the next option.  He was stressed today with traffic he said which is likely the reason that he was elevated today he says. His protein is high at 2.26 on 5/24/22 and may need to get a biopsy.     CKD IIIa - seen 5/27/22 where it was discussed that he will restart losartan 25mg daily and Valtressa 8.5g, on lasix 40mg BID for increased weight. He comes in next week for the  potassium check.    RONNIE - on CPAP nightly and has more energy    0 minutes precharting, 31 minutes for the visit and 2 min on charting and updating records for a total of 33 minutes spent on the date of the encounter doing chart review, history and exam, documentation and further activities as noted above    Shay Kirkpatrick MD, FACP      Chief complaint:  Camacho Bhagat is a 56 year old male presents for   Chief Complaint   Patient presents with     RECHECK     Pt comes into clinic for a follow up        SUBJECTIVE:  The quantiferon TB was positive. There are no symptoms - sweats, cough, fatique. No known exposure - no high risk (here, clinic, grocery store). Last year he was negative on the same test and CXR.     Diabetes - he is trying to minimize his meds for his diabetes and is concerned that he is being asked to take more meds (meds for lipids). He is under good control and does not want more. He is going back to work which will help him lose weight because he will be more active.    Medications and allergies were reviewed by me today.       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   Plan:  Increase tacrolimus dose, currently on low dose tacrolimus with level < 3.0.  Plan to increase for goal = 8.    Also on myfortic 720mg Q 12 hours.   Prednisone 2.5mg q day.            Abscess, intra-abdominal, postoperative 02/13/2018     Priority: Medium     Ileostomy in place (H) 01/05/2018     Priority: Medium     Peristomal skin complication 11/16/2017     Priority: Medium     Replacing diagnoses that were inactivated after the 10/1/2021 regulatory import.       Painful periwound skin 11/16/2017     Priority: Medium     Encounter for attention to ileostomy (H) 11/16/2017     Priority: Medium     History of creation of ostomy (H) 10/30/2017     Priority: Medium     Elevated serum creatinine 10/16/2017     Priority: Medium     Pancytopenia (H) 08/25/2017     Priority: Medium     Ischemia of both lower extremities 08/25/2017     Priority: Medium     Distal ischemia due to shock/high pressor requirements       S/P liver transplant (H) 07/26/2017     Priority: Medium     Neutropenic colitis (H) 07/04/2017     Priority: Medium     Immunosuppressed status (H) 03/10/2017     Priority: Medium     Liver transplant recipient (H) 03/04/2017     Priority: Medium     Hyperkalemia 02/14/2017     Priority: Medium     Hepatocellular carcinoma (H) 01/27/2016     Priority: Medium     Osteoarthritis 01/18/2015     Priority: Medium     Rheumatoid arthritis (H) 01/18/2015     Priority: Medium     Medication refill- do not delete  11/13/2013     Priority: Medium     Fibromyalgia 08/15/2011     Priority: Medium     Sicca syndrome (H) 08/15/2011     Priority: Medium     Testicular hypofunction      Priority: Medium     Problem list name updated by automated process. Provider to review       Carpal tunnel syndrome 07/08/2002     Priority: Medium       PHYSICAL EXAM:  BP (!) 176/83 (BP Location: Right arm, Patient Position: Sitting, Cuff Size: Adult Large)   Pulse 75   Resp 16   Ht 1.829 m (6')   Wt 130.2 kg (287 lb)   SpO2 97%   BMI 38.92 kg/m    Constitutional: no distress, comfortable, pleasant   Cardiovascular: regular rate and rhythm, normal S1 and S2, no murmurs, rubs or gallops  Respiratory: clear to  auscultation, no wheezes or crackles, normal breath sounds

## 2022-06-07 LAB
GAMMA INTERFERON BACKGROUND BLD IA-ACNC: 0.02 IU/ML
M TB IFN-G BLD-IMP: NEGATIVE
M TB IFN-G CD4+ BCKGRND COR BLD-ACNC: 9.98 IU/ML
MITOGEN IGNF BCKGRD COR BLD-ACNC: 0.02 IU/ML
MITOGEN IGNF BCKGRD COR BLD-ACNC: 0.02 IU/ML
QUANTIFERON MITOGEN: 10 IU/ML
QUANTIFERON NIL TUBE: 0.02 IU/ML
QUANTIFERON TB1 TUBE: 0.04 IU/ML
QUANTIFERON TB2 TUBE: 0.04

## 2022-06-29 ASSESSMENT — SLEEP AND FATIGUE QUESTIONNAIRES
HOW LIKELY ARE YOU TO NOD OFF OR FALL ASLEEP WHILE SITTING AND READING: WOULD NEVER DOZE
HOW LIKELY ARE YOU TO NOD OFF OR FALL ASLEEP WHILE SITTING AND TALKING TO SOMEONE: WOULD NEVER DOZE
HOW LIKELY ARE YOU TO NOD OFF OR FALL ASLEEP WHEN YOU ARE A PASSENGER IN A CAR FOR AN HOUR WITHOUT A BREAK: WOULD NEVER DOZE
HOW LIKELY ARE YOU TO NOD OFF OR FALL ASLEEP WHILE SITTING QUIETLY AFTER LUNCH WITHOUT ALCOHOL: WOULD NEVER DOZE
HOW LIKELY ARE YOU TO NOD OFF OR FALL ASLEEP WHILE SITTING INACTIVE IN A PUBLIC PLACE: WOULD NEVER DOZE
HOW LIKELY ARE YOU TO NOD OFF OR FALL ASLEEP WHILE LYING DOWN TO REST IN THE AFTERNOON WHEN CIRCUMSTANCES PERMIT: WOULD NEVER DOZE
HOW LIKELY ARE YOU TO NOD OFF OR FALL ASLEEP WHILE WATCHING TV: WOULD NEVER DOZE
HOW LIKELY ARE YOU TO NOD OFF OR FALL ASLEEP IN A CAR, WHILE STOPPED FOR A FEW MINUTES IN TRAFFIC: WOULD NEVER DOZE

## 2022-07-05 ENCOUNTER — MYC MEDICAL ADVICE (OUTPATIENT)
Dept: INTERNAL MEDICINE | Facility: CLINIC | Age: 57
End: 2022-07-05

## 2022-07-05 DIAGNOSIS — M15.0 PRIMARY OSTEOARTHRITIS INVOLVING MULTIPLE JOINTS: ICD-10-CM

## 2022-07-06 ENCOUNTER — OFFICE VISIT (OUTPATIENT)
Dept: SLEEP MEDICINE | Facility: CLINIC | Age: 57
End: 2022-07-06
Payer: COMMERCIAL

## 2022-07-06 VITALS
SYSTOLIC BLOOD PRESSURE: 161 MMHG | OXYGEN SATURATION: 97 % | HEART RATE: 79 BPM | HEIGHT: 72 IN | BODY MASS INDEX: 40.5 KG/M2 | DIASTOLIC BLOOD PRESSURE: 82 MMHG | WEIGHT: 299 LBS

## 2022-07-06 DIAGNOSIS — G47.33 OSA (OBSTRUCTIVE SLEEP APNEA): Primary | ICD-10-CM

## 2022-07-06 PROCEDURE — 99213 OFFICE O/P EST LOW 20 MIN: CPT | Performed by: INTERNAL MEDICINE

## 2022-07-06 NOTE — PROGRESS NOTES
[facility]   Outpatient Sleep Medicine Follow-up Visit  July 6, 2022    Name: Camacho Bhagat MRN# [id number]   Age: 56 year old YOB: 1965     Date of Consultation: July 6, 2022  Consultation is requested by: No referring provider defined for this encounter.  Primary care provider: Shay Kirkpatrick           Assessment and Plan:     Sleep Diagnoses:    Severe obstructive sleep apnea with hypoxemia currently effectively treated with auto titration CPAP    Comorbid conditions:    Hypertension    Hepatocellular cancer status post transplant with rejection    Type 2 diabetes mellitus    DVT with IVC filter    History of septic shock with distal ischemia    Rheumatoid arthritis      Summary Recommendations:    Continue auto titration CPAP with nasal pillow mask    Return to clinic 1 year or earlier if you have recurrent symptoms snoring, sleep disruption or nonrestorative sleep             History of Present Illness:     Camacho Bhagat is a 56 year old male recently restarted on auto titration CPAP for severe obstructive sleep apnea and hypoxemia effectively treated with CPAP currently on CPAP 5-20 with residual AHI of less than 2 and average use 7.5 hours daily with resolution of nonrestorative sleep (ESS 0) and sleep disruption (JOANN 0).    PREVIOUS SLEEP STUDIES:    Study Date: 4/27/2022    Diagnostic PSG  Sleep Architecture: Sleep fragmentation  The total recording time of the polysomnogram was 140.2 minutes. The total sleep time was 125.5 minutes. Sleep latency was 0.2 minutes. REM latency was 123.0 minutes. Arousal index was 36.8 arousals per hour. Sleep efficiency was 89.5%. Wake after sleep onset was 14.5 minutes. The patient spent 21.9% of total sleep time in Stage N1, 73.7% in Stage N2, 0.0% in Stage N3, and 4.4% in REM. Time in REM supine was 5.5 minutes.     Respiration: Severe RONNIE with hypoxemia    Events ? The polysomnogram revealed a presence of 70 obstructive, -  central, and 4 mixed apneas resulting in an apnea index of 35.4 events per hour. There were 93 obstructive hypopneas and - central hypopneas resulting in an obstructive hypopnea index of 44.5 and central hypopnea index of - events per hour. The combined apnea/hypopnea index was 79.8 events per hour (central apnea/hypopnea index was - events per hour).  The REM AHI was 65.5 events per hour. The supine AHI was 82.8 events per hour. The RERA index was 0.5 events per hour. The RDI was 80.3 events per hour.    Snoring - was reported as loud.    Respiratory rate and pattern - was notable for normal respiratory rate and pattern.    Sustained Sleep Associated Hypoventilation - Transcutaneous carbon dioxide monitoring was not used.    Sleep Associated Hypoxemia - (Greater than 5 minutes O2 sat at or below 88%) was present. Baseline oxygen saturation was 90.4%. Lowest oxygen saturation was 67.0%. Time spent less than or equal to 88% was 40.4 minutes. Time spent less than or equal to 89% was 48.2 minutes.      Treatment PSG  Sleep Architecture: Decreased sleep fragmentation  At 12:56:44 AM the patient was placed on PAP treatment and was titrated at pressures ranging from CPAP 5 cmH2O up to CPAP 14 cmH2O. The total recording time of the treatment portion of the study was 352.8 minutes. The total sleep time was 337.0 minutes. During the treatment portion of the study the sleep latency was 2.5 minutes. REM latency was 43.5 minutes. Arousal index was 8.0 arousals per hour. Sleep efficiency was 95.5%. Wake after sleep onset was 13.0 minutes. The patient spent 6.5% of total sleep time in Stage N1, 32.9% in Stage N2, 17.8% in Stage N3, and 42.7% in REM. Time in REM supine was 144.0 minutes.      Respiration: Sleep disordered breathing noted on the baseline portion of the study was nearly fully resolved with positive airway pressure therapy.    The final pressure was CPAP 14 cmH2O with an AHI of 0.5 events per hour. Time in REM supine  on final pressure was 68.0 minutes.      Movement Activity:     Periodic Limb Movements  ? During the study, there were 0 PLMs recorded.    REM EMG Activity - Excessive muscle activity was not present.    Nocturnal Behavior - Abnormal sleep related behaviors were not noted.    Bruxism - None apparent.     Cardiac Summary: Sinus  During the diagnostic portion of the study, the average pulse rate was 60.5 bpm. The minimum pulse rate was 52.0 bpm while the maximum pulse rate was 76.0 bpm.     During the treatment portion of the study, the average pulse rate was 53.3 bpm. The minimum pulse rate was 47.0 bpm while the maximum pulse rate was 72.0 bpm.      Assessment:     Severe RONNIE with hypoxemia     Sleep disordered breathing noted on the baseline portion of the study was nearly fully resolved with positive airway pressure therapy.           Most Recent SCALES:    EPWORTH SLEEPINESS SCALE WITHIN 1 YEAR WITHIN 10 DAYS   Sitting and reading 0 0   Watching TV 0 0   Sitting, inactive in a public place (theatre or mtg.) 0  0    As a passenger in a car 0 0   Lying down to rest in the afternoon when circumstance permit 0 0   Sitting and talking to someone 0 0   Sitting quietly after lunch without alcohol 0 0   In a car, while stopped for a few minutes in traffic 0 0   TOTAL SCORE 0 0   Normal < 11         0--none    1--mild    2--moderate  3--severe      INSOMNIA SEVERITY INDEX WITHIN 1 YEAR   Difficulty falling asleep 0   Difficult staying asleep 0   Problems waking up to early 0   How SATISFIED/DISSATISFIED are you with your CURRENT sleep pattern? 0   How NOTICEABLE to others do you think your sleep pattern is in terms of your quality of life? 0   How WORRIED/DISTRESSED are you about your current sleep pattern? 0   To what extent do you consider your sleep problem to INTERFERE with your daily fuctioning(e.g. daytime fatigue, mood, ability to function at work/daily chores, concentration, mood,etc.) CURRENTLY? 0   INSOMNIA  SEVERITY INDEX TOTAL SCORE 0    --absence of insomnia (0-7); sub-threshold insomnia (8-14); moderate insomnia (15-21); and severe insomnia (22-28)--                  Objective:  CPAP Compliance Targets:   >70% days > 4 hours AHI < 5   30 days ending July 6, 2022  Mask type:  Nasal Pillow   ResMed   Auto-PAP 5.0 - 20.0 cmH2O 30 day usage data:    96% of days with > 4 hours of use. 1/30 days with no use.   Average use 449 minutes per day.   95%ile Leak 16.32 L/min.   CPAP 95% pressure 13.6 cm.   AHI 1.49 events per hour.                 Medications:     Current Outpatient Medications   Medication Sig     alcohol swab prep pads Use to swab area of injection/francisca as directed.     alpha-lipoic acid 600 MG capsule Take 600 mg by mouth     amLODIPine (NORVASC) 10 MG tablet Take 1 tablet (10 mg) by mouth daily     blood glucose (NO BRAND SPECIFIED) lancets standard Use to test blood sugar 1 times daily or as directed.     blood glucose (NO BRAND SPECIFIED) test strip Use to test blood sugar 1 times daily or as directed.     blood glucose (NO BRAND SPECIFIED) test strip Use to test blood sugar 3 times daily or as directed. Any covered brand preferred by Pt that works with meter.     blood glucose calibration (NO BRAND SPECIFIED) solution Use to calibrate meter.     blood glucose monitoring (NO BRAND SPECIFIED) meter device kit Use to test blood sugar 3 times daily or as directed. Any covered brand preferred by Pt.     Blood Glucose Monitoring Suppl (CONTOUR BLOOD GLUCOSE SYSTEM) w/Device KIT For use with the Omnipod Dash insulin pump     cholecalciferol (VITAMIN D3) 1000 UNIT tablet Take 1 tablet (1,000 Units) by mouth daily (Patient taking differently: Take 1,000 Units by mouth every morning)     Continuous Blood Gluc Sensor (DEXCOM G6 SENSOR) MISC Change every 10 days.     Continuous Blood Gluc Transmit (DEXCOM G6 TRANSMITTER) MISC Change every 3 months.     cyclobenzaprine (FLEXERIL) 10 MG tablet Take 1 tablet (10 mg) by  mouth 3 times daily as needed for muscle spasms     fluticasone (FLONASE) 50 MCG/ACT nasal spray Spray 1 spray into both nostrils daily     furosemide (LASIX) 40 MG tablet Take 1 tablet (40 mg) by mouth 2 times daily     gabapentin (NEURONTIN) 300 MG capsule Take 2 capsules (600 mg) by mouth 3 times daily     Glucose Blood (BLOOD GLUCOSE TEST STRIPS) STRP 1 strip by Lancet route 3 times daily     Insulin Disposable Pump (OMNIPOD DASH 5 PACK PODS) MISC 1 each every 48 hours     Insulin Disposable Pump (OMNIPOD DASH 5 PACK PODS) MISC 1 each every 3 days     Insulin Lispro (HUMALOG KWIKPEN) 200 UNIT/ML soln To be used in the Ominpod pump. Total daily dose up to 90 U.     insulin pen needle (BD ENE U/F) 32G X 4 MM miscellaneous Use 1 pen needle 4 times daily or as directed.     lidocaine (LIDODERM) 5 % patch Place 1 patch onto the skin every 24 hours To prevent lidocaine toxicity, patient should be patch free for 12 hrs daily.     losartan (COZAAR) 25 MG tablet Take 1 tablet (25 mg) by mouth daily     metoprolol succinate ER (TOPROL-XL) 200 MG 24 hr tablet Take 1 tablet (200 mg) by mouth daily     multivitamin w/minerals (THERA-VIT-M) tablet Take 1 tablet by mouth every morning     mycophenolic acid (GENERIC EQUIVALENT) 360 MG EC tablet TAKE 2 TABLETS (720 MG) BY MOUTH EVERY 12 HOURS     oxyCODONE (ROXICODONE) 5 MG tablet Take 1 tablet (5 mg) by mouth 4 times daily as needed for pain maximum 6 tablet(s) per day     patiromer (VELTASSA) 8.4 g packet Take 8.4 g by mouth daily     predniSONE (DELTASONE) 2.5 MG tablet TAKE 1 TABLET BY MOUTH EVERY DAY     sildenafil (REVATIO) 20 MG tablet Take 2 tablets (40 mg) by mouth daily as needed (erectile dysfunction)     tacrolimus (GENERIC EQUIVALENT) 1 MG capsule Take 4 capsules (4 mg) by mouth every morning AND 3 capsules (3 mg) every evening.     thin (NO BRAND SPECIFIED) lancets Use with lanceting device. Any covered brand.     ursodiol (ACTIGALL) 500 MG tablet TAKE 1 TABLET BY  MOUTH TWICE A DAY     No current facility-administered medications for this visit.        Allergies   Allergen Reactions     Erythromycin GI Disturbance     Losartan Other (See Comments)     Consistently develops hyperkalemia     Vioxx      Nausea, vomiting            Problem List:     Patient Active Problem List   Diagnosis     Carpal tunnel syndrome     Testicular hypofunction     Fibromyalgia     Sicca syndrome (H)     Medication refill- do not delete      Hepatocellular carcinoma (H)     Osteoarthritis     Rheumatoid arthritis (H)     Hyperkalemia     Liver transplant recipient (H)     Immunosuppressed status (H)     Neutropenic colitis (H)     S/P liver transplant (H)     Pancytopenia (H)     Ischemia of both lower extremities     Elevated serum creatinine     History of creation of ostomy (H)     Peristomal skin complication     Painful periwound skin     Encounter for attention to ileostomy (H)     Ileostomy in place (H)     Abscess, intra-abdominal, postoperative     Liver transplant rejection (H)     CMV colitis (H)     Type 2 diabetes mellitus with diabetic polyneuropathy, with long-term current use of insulin (H)     Diarrhea of infectious origin (Plesiomonas shigelloides (formerly known Aeromonas shigelloides) in 3/14/2019 sample)     Hypertension secondary to other renal disorders     Chronic kidney disease, stage 3 (H)     Morbid obesity (H)            Past Medical History:     Does not need 02 supplement at night   Past Medical History:   Diagnosis Date     Depressive disorder, not elsewhere classified      Diabetes type 2, controlled (H) 11/10/2016     Esophageal reflux      Fibromyalgia 1/2009    dx with Dr Benitez( Rheum)     Gangrene of finger (H) 8/25/2017     H/O deep venous thrombosis 11/2001    Permanent IVC filter in place.     H/O CASTAÑEDA (nonalcoholic steatohepatitis)      H/O Pneumonia, organism unspecified(486) 10/2001    Included ARDS, sepsis, and  acute renal failure; hospitalized,  required tracheostomy placement.     H/O: HTN (hypertension) 11/2001    No longer prescribed antihypertensive medication.       History of CVA (cerebrovascular accident)      History of hepatocellular carcinoma      History of liver transplant (H)      History of obstructive sleep apnea     No longer wears CPAP since losing approximately 200 pounds with his liver transplant and its complications.       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     Osteoarthritis      Presence of PERMANENT IVC filter      Rheumatoid arthritis(714.0)              Past Surgical History:    No h/o  upper airway surgery  Past Surgical History:   Procedure Laterality Date     BENCH LIVER N/A 3/4/2017    Procedure: BENCH LIVER;  Surgeon: Jovan Tran MD;  Location: [facility] OR     CHOLECYSTECTOMY       COLONOSCOPY N/A 7/21/2017    Procedure: COMBINED COLONOSCOPY, SINGLE OR MULTIPLE BIOPSY/POLYPECTOMY BY BIOPSY;  Colonoscopy;  Surgeon: Izaiah Montes MD;  Location:  GI     ENDOSCOPIC RETROGRADE CHOLANGIOPANCREATOGRAM N/A 5/22/2018    Procedure: COMBINED ENDOSCOPIC RETROGRADE CHOLANGIOPANCREATOGRAPHY, PLACE TUBE/STENT;  Endoscopic Retrograde Cholangiopancreatography with sphincterotomy and pancreatic duct stent placement;  Surgeon: Zay Gaitan MD;  Location: [facility] OR     ENT SURGERY      Repair of deviated septum     ESOPHAGOSCOPY, GASTROSCOPY, DUODENOSCOPY (EGD), COMBINED N/A 8/4/2016    Procedure: COMBINED ESOPHAGOSCOPY, GASTROSCOPY, DUODENOSCOPY (EGD), BIOPSY SINGLE OR MULTIPLE;  Surgeon: Trent Pederson MD;  Location:  GI     ESOPHAGOSCOPY, GASTROSCOPY, DUODENOSCOPY (EGD), COMBINED N/A 8/27/2017    Procedure: COMBINED ESOPHAGOSCOPY, GASTROSCOPY, DUODENOSCOPY (EGD);;  Surgeon: Los Wynn MD;  Location:  GI     INCISION AND DRAINAGE  ABDOMEN WASHOUT, COMBINED Right 2018    Procedure: COMBINED INCISION AND DRAINAGE ABDOMEN WASHOUT;  COMBINED INCISION AND DRAINAGE right ABDOMEN flank;  Surgeon: Sara Dinh MD;  Location: [facility] OR     LAPAROTOMY EXPLORATORY N/A 2017    Procedure: LAPAROTOMY EXPLORATORY;  Exploratory Laparotomy, washout;  Surgeon: Tip Zahng MD;  Location: [facility] OR     LAPAROTOMY EXPLORATORY N/A 2017    Procedure: LAPAROTOMY EXPLORATORY;  Exploratory Laparotomy, Washout with closure.;  Surgeon: Tip Zhang MD;  Location: [facility] OR     LAPAROTOMY EXPLORATORY N/A 2017    Procedure: LAPAROTOMY EXPLORATORY;  Exploratory Laparotomy, Right angelique-colectomy, end ileostomy, mucosal fistula, partial omentectomy;  Surgeon: Sara Dinh MD;  Location:  OR     ORTHOPEDIC SURGERY Right     Repair of dislocated wrist.       RELEASE TRIGGER FINGER Right 11/10/2017    Procedure: RELEASE TRIGGER FINGER;  Debride, V-Y Flap Right Index Finger;  Surgeon: Momo Noonan MD;  Location: [facility] OR     TAKEDOWN ILEOSTOMY N/A 2018    Procedure: TAKEDOWN ILEOSTOMY;  Exploratory Laparotomy, Lysis of Adhesions, Takedown Of End Ileostomy, Takedown of mucocutaneous fistula, ileocolic resection  And Colorectal Anastomosis, Primary repair of Ventral Hernia, Anesthesia Block ;  Surgeon: Sara Dinh MD;  Location: [facility] OR     TRACHEOSTOMY  2001    During hospitalization for pneumonia.       TRANSPLANT LIVER RECIPIENT  DONOR N/A 3/4/2017    Procedure: TRANSPLANT LIVER RECIPIENT  DONOR;  Surgeon: Jovan Tran MD;  Location:  OR     [facility] TOTAL KNEE ARTHROPLASTY      Right knee arthroscopy              Physical Examination:     Reported vitals:  There were no vitals taken for this visit.   GENERAL: Healthy, alert and no distress  EYES: Eyes grossly normal to inspection.  No discharge or erythema, or obvious scleral/conjunctival abnormalities.  RESP: No audible  wheeze, cough, or visible cyanosis.  No visible retractions or increased work of breathing.    SKIN: Visible skin clear. No significant rash, abnormal pigmentation or lesions.  NEURO: Cranial nerves grossly intact.  Mentation and speech appropriate for age.  PSYCH: Mentation appears normal, affect normal/bright, judgement and insight intact, normal speech and appearance well-groomed.        Copy to: Shay Kirkpatrick MD 7/6/2022         Total time spent reviewing medical records including previous testing and interpretation as well as direct patient contact and documentation on this date: 25 min

## 2022-07-06 NOTE — PATIENT INSTRUCTIONS
"      MY TREATMENT INFORMATION FOR SLEEP APNEA-  Camacho Bhagat    DOCTOR : JEANNA RUSSELL MD      Frequently asked questions:  1. What is Obstructive Sleep Apnea (RONNIE)? RONNIE is the most common type of sleep apnea. Apnea means, \"without breath.\"  Apnea is most often caused by narrowing or collapse of the upper airway as muscles relax during sleep.   Almost everyone has occasional apneas. Most people with sleep apnea have had brief interruptions at night frequently for many years.  The severity of sleep apnea is related to how frequent and severe the events are.   2. What are the consequences of RONNIE? Symptoms include: feeling sleepy during the day, snoring loudly, gasping or stopping of breathing, trouble sleeping, and occasionally morning headaches or heartburn at night.  Sleepiness can be serious and even increase the risk of falling asleep while driving. Other health consequences may include development of high blood pressure and other cardiovascular disease in persons who are susceptible. Untreated RONNIE  can contribute to heart disease, stroke and diabetes.   3. What are the treatment options? In most situations, sleep apnea is a lifelong disease that must be managed with daily therapy. Medications are not effective for sleep apnea and surgery is generally not considered until other therapies have been tried. Your treatment is your choice . Continuous Positive Airway (CPAP) works right away and is the therapy that is effective in nearly everyone. An oral device to hold your jaw forward is usually the next most reliable option. Other options include postioning devices (to keep you off your back), weight loss, and surgery including a tongue pacing device. There is more detail about some of these options below.  4. Are my sleep studies covered by insurance? Although we will request verification of coverage, we advise you also check in advance of the study to ensure there is coverage.    Important tips for those choosing " CPAP and similar devices   Know your equipment:  CPAP is continuous positive airway pressure that prevents obstructive sleep apnea by keeping the throat from collapsing while you are sleeping. In most cases, the device is  smart  and can slowly self-adjusts if your throat collapses and keeps a record every day of how well you are treated-this information is available to you and your care team.  BPAP is bilevel positive airway pressure that keeps your throat open and also assists each breath with a pressure boost to maintain adequate breathing.  Special kinds of BPAP are used in patients who have inadequate breathing from lung or heart disease. In most cases, the device is  smart  and can slowly self-adjusts to assist breathing. Like CPAP, the device keeps a record of how well you are treated.  Your mask is your connection to the device. You get to choose what feels most comfortable and the staff will help to make sure if fits. Here: are some examples of the different masks that are available:       Key points to remember on your journey with sleep apnea:  Sleep study.  PAP devices often need to be adjusted during a sleep study to show that they are effective and adjusted right.  Good tips to remember: Try wearing just the mask during a quiet time during the day so your body adapts to wearing it. A humidifier is recommended for comfort in most cases to prevent drying of your nose and throat. Allergy medication from your provider may help you if you are having nasal congestion.  Getting settled-in. It takes more than one night for most of us to get used to wearing a mask. Try wearing just the mask during a quiet time during the day so your body adapts to wearing it. A humidifier is recommended for comfort in most cases. Our team will work with you carefully on the first day and will be in contact within 4 days and again at 2 and 4 weeks for advice and remote device adjustments. Your therapy is evaluated by the device  each day.   Use it every night. The more you are able to sleep naturally for 7-8 hours, the more likely you will have good sleep and to prevent health risks or symptoms from sleep apnea. Even if you use it 4 hours it helps. Occasionally all of us are unable to use a medical therapy, in sleep apnea, it is not dangerous to miss one night.   Communicate. Call our skilled team on the number provided on the first day if your visit for problems that make it difficult to wear the device. Over 2 out of 3 patients can learn to wear the device long-term with help from our team. Remember to call our team or your sleep providers if you are unable to wear the device as we may have other solutions for those who cannot adapt to mask CPAP therapy. It is recommended that you sleep your sleep provider within the first 3 months and yearly after that if you are not having problems.   Use it for your health. We encourage use of CPAP masks during daytime quiet periods to allow your face and brain to adapt to the sensation of CPAP so that it will be a more natural sensation to awaken to at night or during naps. This can be very useful during the first few weeks or months of adapting to CPAP though it does not help medically to wear CPAP during wakefulness and  should not be used as a strategy just to meet guidelines.  Take care of your equipment. Make sure you clean your mask and tubing using directions every day and that your filter and mask are replaced as recommended or if they are not working.     BESIDES CPAP, WHAT OTHER THERAPIES ARE THERE?    Positioning Device  Positioning devices are generally used when sleep apnea is mild and only occurs on your back.This example shows a pillow that straps around the waist. It may be appropriate for those whose sleep study shows milder sleep apnea that occurs primarily when lying flat on one's back. Preliminary studies have shown benefit but effectiveness at home may need to be verified by a  home sleep test. These devices are generally not covered by medical insurance.  Examples of devices that maintain sleeping on the back to prevent snoring and mild sleep apnea.    Belt type body positioner  http://"[employer]Get Me Listed/    Electronic reminder  http://nightshifttherapy.com/  http://www.Poder[employer]ediazzpod.com.au/      Oral Appliance  What is oral appliance therapy?  An oral appliance device fits on your teeth at night like a retainer used after having braces. The device is made by a specialized dentist and requires several visits over 1-2 months before a manufactured device is made to fit your teeth and is adjusted to prevent your sleep apnea. Once an oral device is working properly, snoring should be improved. A home sleep test may be recommended at that time if to determine whether the sleep apnea is adequately treated.       Some things to remember:  -Oral devices are often, but not always, covered by your medical insurance. Be sure to check with your insurance provider.   -If you are referred for oral therapy, you will be given a list of specialized dentists to consider or you may choose to visit the Web site of the American Academy of Dental Sleep Medicine  -Oral devices are less likely to work if you have severe sleep apnea or are extremely overweight.     More detailed information  An oral appliance is a small acrylic device that fits over the upper and lower teeth  (similar to a retainer or a mouth guard). This device slightly moves jaw forward, which moves the base of the tongue forward, opens the airway, improves breathing for effective treat snoring and obstructive sleep apnea in perhaps 7 out of 10 people .  The best working devices are custom-made by a dental device  after a mold is made of the teeth 1, 2, 3.  When is an oral appliance indicated?  Oral appliance therapy is recommended as a first-line treatment for patients with primary snoring, mild sleep apnea, and for patients with moderate  sleep apnea who prefer appliance therapy to use of CPAP4, 5. Severity of sleep apnea is determined by sleep testing and is based on the number of respiratory events per hour of sleep.   How successful is oral appliance therapy?  The success rate of oral appliance therapy in patients with mild sleep apnea is 75-80% while in patients with moderate sleep apnea it is 50-70%. The chance of success in patients with severe sleep apnea is 40-50%. The research also shows that oral appliances have a beneficial effect on the cardiovascular health of RONNIE patients at the same magnitude as CPAP therapy7.  Oral appliances should be a second-line treatment in cases of severe sleep apnea, but if not completely successful then a combination therapy utilizing CPAP plus oral appliance therapy may be effective. Oral appliances tend to be effective in a broad range of patients although studies show that the patients who have the highest success are females, younger patients, those with milder disease, and less severe obesity. 3, 6.   Finding a dentist that practices dental sleep medicine  Specific training is available through the American Academy of Dental Sleep Medicine for dentists interested in working in the field of sleep. To find a dentist who is educated in the field of sleep and the use of oral appliances, near you, visit the Web site of the American Academy of Dental Sleep Medicine.    References  1. Tabitha, et al. Objectively measured vs self-reported compliance during oral appliance therapy for sleep-disordered breathing. Chest 2013; 144(5): 3004-5132.  2. Rossana, et al. Objective measurement of compliance during oral appliance therapy for sleep-disordered breathing. Thorax 2013; 68(1): 91-96.  3. Slick et al. Mandibular advancement devices in 620 men and women with RONNIE and snoring: tolerability and predictors of treatment success. Chest 2004; 125: 2494-4793.  4. Bud et al. Oral appliances for snoring and  RONNIE: a review. Sleep 2006; 29: 244-262.  5. Shaggy et al. Oral appliance treatment for RONNIE: an update. J Clin Sleep Med 2014; 10(2): 215-227.  6. yolette Mtz al. Predictors of OSAH treatment outcome. J Dent Res 2007; 86: 3833-5275.      Weight Loss:    Weight loss is a long-term strategy that may improve sleep apnea in some patients.    Weight management is a personal decision and the decision should be based on your interest and the potential benefits.  If you are interested in exploring weight loss strategies, the following discussion covers the impact on weight loss on sleep apnea and the approaches that may be successful.    Being overweight does not necessarily mean you will have health consequences.  Those who have BMI over 35 or over 27 with existing medical conditions carries greater risk.   Weight loss decreases severity of sleep apnea in most people with obesity. For those with mild obesity who have developed snoring with weight gain, even 15-30 pound weight loss can improve and occasionally eliminate sleep apnea.  Structured and life-long dietary and health habits are necessary to lose weight and keep healthier weight levels.     Though there may be significant health benefits from weight loss, long-term weight loss is very difficult to achieve- studies show success with dietary management in less than 10% of people. In addition, substantial weight loss may require years of dietary control and may be difficult if patients have severe obesity. In these cases, surgical management may be considered.  Finally, older individuals who have tolerated obesity without health complications may be less likely to benefit from weight loss strategies.      [unfilled]    Surgery:    Surgery for obstructive sleep apnea is considered generally only when other therapies fail to work. Surgery may be discussed with you if you are having a difficult time tolerating CPAP and or when there is an abnormal structure that  requires surgical correction.  Nose and throat surgeries often enlarge the airway to prevent collapse.  Most of these surgeries create pain for 1-2 weeks and up to half of the most common surgeries are not effective throughout life.  You should carefully discuss the benefits and drawbacks to surgery with your sleep provider and surgeon to determine if it is the best solution for you.   More information  Surgery for RONNIE is directed at areas that are responsible for narrowing or complete obstruction of the airway during sleep.  There are a wide range of procedures available to enlarge and/or stabilize the airway to prevent blockage of breathing in the three major areas where it can occur: the palate, tongue, and nasal regions.  Successful surgical treatment depends on the accurate identification of the factors responsible for obstructive sleep apnea in each person.  A personalized approach is required because there is no single treatment that works well for everyone.  Because of anatomic variation, consultation with an examination by a sleep surgeon is a critical first step in determining what surgical options are best for each patient.  In some cases, examination during sedation may be recommended in order to guide the selection of procedures.  Patients will be counseled about risks and benefits as well as the typical recovery course after surgery. Surgery is typically not a cure for a person s RONNIE.  However, surgery will often significantly improve one s RONNIE severity (termed  success rate ).  Even in the absence of a cure, surgery will decrease the cardiovascular risk associated with OSA7; improve overall quality of life8 (sleepiness, functionality, sleep quality, etc).      Palate Procedures:  Patients with RONNIE often have narrowing of their airway in the region of their tonsils and uvula.  The goals of palate procedures are to widen the airway in this region as well as to help the tissues resist collapse.  Modern  palate procedure techniques focus on tissue conservation and soft tissue rearrangement, rather than tissue removal.  Often the uvula is preserved in this procedure. Residual sleep apnea is common in patient after pharyngoplasty with an average reduction in sleep apnea events of 33%2.      Tongue Procedures:  ExamWhile patients are awake, the muscles that surround the throat are active and keep this region open for breathing. These muscles relax during sleep, allowing the tongue and other structures to collapse and block breathing.  There are several different tongue procedures available.  Selection of a tongue base procedure depends on characteristics seen on physical exam.  Generally, procedures are aimed at removing bulky tissues in this area or preventing the back of the tongue from falling back during sleep.  Success rates for tongue surgery range from 50-62%3.    Hypoglossal Nerve Stimulation:  Hypoglossal nerve stimulation has recently received approval from the [place] Food and Drug Administration for the treatment of obstructive sleep apnea.  This is based on research showing that the system was safe and effective in treating sleep apnea6.  Results showed that the median AHI score decreased 68%, from 29.3 to 9.0. This therapy uses an implant system that senses breathing patterns and delivers mild stimulation to airway muscles, which keeps the airway open during sleep.  The system consists of three fully implanted components: a small generator (similar in size to a pacemaker), a breathing sensor, and a stimulation lead.  Using a small handheld remote, a patient turns the therapy on before bed and off upon awakening.    Candidates for this device must be greater than 22 years of age, have moderate to severe RONNIE (AHI between 20-65), BMI less than 32, have tried CPAP/oral appliance without success, and have appropriate upper airway anatomy (determined by a sleep endoscopy performed by   Hsia).    Hypoglossal Nerve Stimulation Pathway:    The sleep surgeon s office will work with the patient through the insurance prior-authorization process (including communications and appeals).    Nasal Procedures:  Nasal obstruction can interfere with nasal breathing during the day and night.  Studies have shown that relief of nasal obstruction can improve the ability of some patients to tolerate positive airway pressure therapy for obstructive sleep apnea1.  Treatment options include medications such as nasal saline, topical corticosteroid and antihistamine sprays, and oral medications such as antihistamines or decongestants. Non-surgical treatments can include external nasal dilators for selected patients. If these are not successful by themselves, surgery can improve the nasal airway either alone or in combination with these other options.      Combination Procedures:  Combination of surgical procedures and other treatments may be recommended, particularly if patients have more than one area of narrowing or persistent positional disease.  The success rate of combination surgery ranges from 66-80%2,3.    References  Wandy TERRELL. The Role of the Nose in Snoring and Obstructive Sleep Apnoea: An Update.  Eur Arch Otorhinolaryngol. 2011; 268: 1365-73.   Mesfin SM; Ronaldo JA; Ann JR; Pallanch JF; Geoff MB; Adrian SG; Sudha BOXD. Surgical modifications of the upper airway for obstructive sleep apnea in adults: a systematic review and meta-analysis. SLEEP 2010;33(10):7039-1192. Aidee HANKINS. Hypopharyngeal surgery in obstructive sleep apnea: an evidence-based medicine review.  Arch Otolaryngol Head Neck Surg. 2006 Feb;132(2):206-13.  Jordan YH1, Isauro Y, Tino CHAKA. The efficacy of anatomically based multilevel surgery for obstructive sleep apnea. Otolaryngol Head Neck Surg. 2003 Oct;129(4):327-35.  Aidee HANKINS, Goldberg A. Hypopharyngeal Surgery in Obstructive Sleep Apnea: An Evidence-Based Medicine Review. Arch  Otolaryngol Head Neck Surg. 2006 Feb;132(2):206-13.  Chucky PJ et al. Upper-Airway Stimulation for Obstructive Sleep Apnea.  N Engl J Med. 2014 Jan 9;370(2):139-49.  Fermin Y et al. Increased Incidence of Cardiovascular Disease in Middle-aged Men with Obstructive Sleep Apnea. Am J Respir Crit Care Med; 2002 166: 159-165  Rosales EM et al. Studying Life Effects and Effectiveness of Palatopharyngoplasty (SLEEP) study: Subjective Outcomes of Isolated Uvulopalatopharyngoplasty. Otolaryngol Head Neck Surg. 2011; 144: 623-631.        WHAT IF I ONLY HAVE SNORING?    Mandibular advancement devices, lateral sleep positioning, long-term weight loss and treatment of nasal allergies have been shown to improve snoring.  Exercising tongue muscles with a game (https://www.ncbi.nlm.nih.gov/pubmed/61587579) or stimulating the tongue during the day with a device (https://doi.org/10.3390/yad1964) have improved snoring in some individuals.    Remember to Drive Safe... Drive Alive     Sleep health profoundly affects your health, mood, and your safety.  Thirty three percent of the population (one in three of us) is not getting enough sleep and many have a sleep disorder. Not getting enough sleep or having an untreated / undertreated sleep condition may make us sleepy without even knowing it. In fact, our driving could be dramatically impaired due to our sleep health. As your provider, here are some things I would like you to know about driving:     Here are some warning signs for impairment and dangerous drowsy driving:              -Having been awake more than 16 hours               -Looking tired               -Eyelid drooping              -Head nodding (it could be too late at this point)              -Driving for more than 30 minutes     Some things you could do to make the driving safer if you are experiencing some drowsiness:              -Stop driving and rest              -Call for transportation              -Make sure your  sleep disorder is adequately treated     Some things that have been shown NOT to work when experiencing drowsiness while driving:              -Turning on the radio              -Opening windows              -Eating any  distracting  /  entertaining  foods (e.g., sunflower seeds, candy, or any other)              -Talking on the phone      Your decision may not only impact your life, but also the life of others. Please, remember to drive safe for yourself and all of us.

## 2022-07-07 DIAGNOSIS — E11.21 TYPE 2 DIABETES MELLITUS WITH DIABETIC NEPHROPATHY, WITH LONG-TERM CURRENT USE OF INSULIN (H): ICD-10-CM

## 2022-07-07 DIAGNOSIS — Z79.4 TYPE 2 DIABETES MELLITUS WITH DIABETIC NEPHROPATHY, WITH LONG-TERM CURRENT USE OF INSULIN (H): ICD-10-CM

## 2022-07-07 RX ORDER — OXYCODONE HYDROCHLORIDE 5 MG/1
5 TABLET ORAL 4 TIMES DAILY PRN
Qty: 30 TABLET | Refills: 0 | Status: SHIPPED | OUTPATIENT
Start: 2022-07-07 | End: 2022-10-12

## 2022-07-07 RX ORDER — PROCHLORPERAZINE 25 MG/1
SUPPOSITORY RECTAL
Qty: 1 EACH | Refills: 3 | Status: SHIPPED | OUTPATIENT
Start: 2022-07-07 | End: 2023-05-09

## 2022-07-07 RX ORDER — PROCHLORPERAZINE 25 MG/1
SUPPOSITORY RECTAL
Qty: 9 EACH | Refills: 3 | Status: SHIPPED | OUTPATIENT
Start: 2022-07-07 | End: 2022-10-10

## 2022-07-08 ENCOUNTER — MYC MEDICAL ADVICE (OUTPATIENT)
Dept: INTERNAL MEDICINE | Facility: CLINIC | Age: 57
End: 2022-07-08

## 2022-07-08 DIAGNOSIS — G89.29 CHRONIC PAIN OF RIGHT KNEE: ICD-10-CM

## 2022-07-08 DIAGNOSIS — M25.551 BILATERAL HIP PAIN: Primary | ICD-10-CM

## 2022-07-08 DIAGNOSIS — M25.561 CHRONIC PAIN OF RIGHT KNEE: ICD-10-CM

## 2022-07-08 DIAGNOSIS — M25.552 BILATERAL HIP PAIN: Primary | ICD-10-CM

## 2022-07-19 ENCOUNTER — LAB (OUTPATIENT)
Dept: LAB | Facility: CLINIC | Age: 57
End: 2022-07-19
Payer: COMMERCIAL

## 2022-07-19 DIAGNOSIS — Z13.220 LIPID SCREENING: ICD-10-CM

## 2022-07-19 DIAGNOSIS — Z94.4 LIVER REPLACED BY TRANSPLANT (H): ICD-10-CM

## 2022-07-19 DIAGNOSIS — N18.30 STAGE 3 CHRONIC KIDNEY DISEASE, UNSPECIFIED WHETHER STAGE 3A OR 3B CKD (H): ICD-10-CM

## 2022-07-19 LAB
ALBUMIN SERPL-MCNC: 3.8 G/DL (ref 3.4–5)
ALP SERPL-CCNC: 196 U/L (ref 40–150)
ALT SERPL W P-5'-P-CCNC: 32 U/L (ref 0–70)
ANION GAP SERPL CALCULATED.3IONS-SCNC: 8 MMOL/L (ref 3–14)
AST SERPL W P-5'-P-CCNC: 25 U/L (ref 0–45)
BILIRUB DIRECT SERPL-MCNC: 0.3 MG/DL (ref 0–0.2)
BILIRUB SERPL-MCNC: 0.6 MG/DL (ref 0.2–1.3)
BUN SERPL-MCNC: 43 MG/DL (ref 7–30)
CALCIUM SERPL-MCNC: 9 MG/DL (ref 8.5–10.1)
CHLORIDE BLD-SCNC: 108 MMOL/L (ref 94–109)
CO2 SERPL-SCNC: 20 MMOL/L (ref 20–32)
CREAT SERPL-MCNC: 1.51 MG/DL (ref 0.66–1.25)
ERYTHROCYTE [DISTWIDTH] IN BLOOD BY AUTOMATED COUNT: 13.2 % (ref 10–15)
GFR SERPL CREATININE-BSD FRML MDRD: 54 ML/MIN/1.73M2
GLUCOSE BLD-MCNC: 86 MG/DL (ref 70–99)
HCT VFR BLD AUTO: 36.4 % (ref 40–53)
HGB BLD-MCNC: 12.4 G/DL (ref 13.3–17.7)
MCH RBC QN AUTO: 30.9 PG (ref 26.5–33)
MCHC RBC AUTO-ENTMCNC: 34.1 G/DL (ref 31.5–36.5)
MCV RBC AUTO: 91 FL (ref 78–100)
PHOSPHATE SERPL-MCNC: 3.3 MG/DL (ref 2.5–4.5)
PLATELET # BLD AUTO: 144 10E3/UL (ref 150–450)
POTASSIUM BLD-SCNC: 4.6 MMOL/L (ref 3.4–5.3)
PROT SERPL-MCNC: 7.4 G/DL (ref 6.8–8.8)
RBC # BLD AUTO: 4.01 10E6/UL (ref 4.4–5.9)
SODIUM SERPL-SCNC: 136 MMOL/L (ref 133–144)
TACROLIMUS BLD-MCNC: 10.2 UG/L (ref 5–15)
TME LAST DOSE: NORMAL H
TME LAST DOSE: NORMAL H
WBC # BLD AUTO: 7.5 10E3/UL (ref 4–11)

## 2022-07-19 PROCEDURE — 80197 ASSAY OF TACROLIMUS: CPT

## 2022-07-19 PROCEDURE — 36415 COLL VENOUS BLD VENIPUNCTURE: CPT

## 2022-07-19 PROCEDURE — 82248 BILIRUBIN DIRECT: CPT

## 2022-07-19 PROCEDURE — 80053 COMPREHEN METABOLIC PANEL: CPT

## 2022-07-19 PROCEDURE — 85027 COMPLETE CBC AUTOMATED: CPT

## 2022-07-22 DIAGNOSIS — M54.16 RADICULOPATHY, LUMBAR REGION: ICD-10-CM

## 2022-07-22 DIAGNOSIS — M54.50 LOW BACK PAIN: ICD-10-CM

## 2022-07-22 RX ORDER — INSULIN LISPRO 200 [IU]/ML
INJECTION, SOLUTION SUBCUTANEOUS
Qty: 90 ML | Refills: 3 | Status: SHIPPED | OUTPATIENT
Start: 2022-07-22 | End: 2022-07-27

## 2022-07-26 ENCOUNTER — TELEPHONE (OUTPATIENT)
Dept: ENDOCRINOLOGY | Facility: CLINIC | Age: 57
End: 2022-07-26

## 2022-07-27 RX ORDER — INSULIN LISPRO 200 [IU]/ML
INJECTION, SOLUTION SUBCUTANEOUS
Qty: 72 ML | Refills: 3 | Status: SHIPPED | OUTPATIENT
Start: 2022-07-27 | End: 2023-05-09

## 2022-07-27 RX ORDER — INSULIN LISPRO 200 [IU]/ML
INJECTION, SOLUTION SUBCUTANEOUS
Qty: 72 ML | Refills: 3 | OUTPATIENT
Start: 2022-07-27 | End: 2022-07-27

## 2022-08-02 ENCOUNTER — TRANSFERRED RECORDS (OUTPATIENT)
Dept: MULTI SPECIALTY CLINIC | Facility: CLINIC | Age: 57
End: 2022-08-02

## 2022-08-02 LAB — RETINOPATHY: NORMAL

## 2022-08-03 NOTE — TELEPHONE ENCOUNTER
DIAGNOSIS: АЛЕКСАНДР hip pain/seeking inj/Dr. Kirkpatrick/[facility]   APPOINTMENT TIME: 8.16.22   NOTES STATUS DETAILS   OFFICE NOTE from other specialist Internal 10.20.20 Dr Isidro Mcleod, [facility]  9.29.20  10.5.20 Dr Albert Yeo, [facility]   MEDICATION LIST Internal    MRI Internal 9.9.20 R hip, L hip   DEXA (osteoporosis/bone health) Internal    XRAYS (IMAGES & REPORTS) Internal 9.29.20 B hip and pelvis

## 2022-08-10 DIAGNOSIS — M25.559 HIP PAIN: Primary | ICD-10-CM

## 2022-08-14 ENCOUNTER — TELEPHONE (OUTPATIENT)
Dept: NEPHROLOGY | Facility: CLINIC | Age: 57
End: 2022-08-14

## 2022-08-14 DIAGNOSIS — E87.5 HYPERKALEMIA: ICD-10-CM

## 2022-08-16 ENCOUNTER — PRE VISIT (OUTPATIENT)
Dept: ORTHOPEDICS | Facility: CLINIC | Age: 57
End: 2022-08-16

## 2022-08-16 ENCOUNTER — TELEPHONE (OUTPATIENT)
Dept: TRANSPLANT | Facility: CLINIC | Age: 57
End: 2022-08-16

## 2022-08-16 DIAGNOSIS — E87.5 HYPERKALEMIA: ICD-10-CM

## 2022-08-16 DIAGNOSIS — Z94.4 LIVER TRANSPLANT RECIPIENT (H): ICD-10-CM

## 2022-08-16 RX ORDER — TACROLIMUS 1 MG/1
CAPSULE ORAL
Qty: 630 CAPSULE | Refills: 3 | Status: SHIPPED | OUTPATIENT
Start: 2022-08-16 | End: 2023-01-31

## 2022-08-16 NOTE — TELEPHONE ENCOUNTER
Noted TAC refill request to send TAC RX to [facility] pharmacy. Sent per request.     Message sent to Camacho to verify if he would like [facility] removed from his preferred pharmacy list.

## 2022-08-16 NOTE — TELEPHONE ENCOUNTER
Patient Call: Medication Refill  Route to LPN  Instruct the patient to first contact their pharmacy. If they have called their pharmacy and require further assistance, route to LPN.    Pharmacy Name:[facility]  Pharmacy Location: [place]  Name of Medication tacrolimus:  Dose:  1 MG capsule  Patient stated if you have any questions feel free to call    When will the patient be out of this medication?: Greater than 3 days (Route standard priority)

## 2022-08-18 DIAGNOSIS — E11.42 DIABETIC POLYNEUROPATHY ASSOCIATED WITH TYPE 2 DIABETES MELLITUS (H): ICD-10-CM

## 2022-08-22 ENCOUNTER — TELEPHONE (OUTPATIENT)
Dept: ORTHOPEDICS | Facility: CLINIC | Age: 57
End: 2022-08-22

## 2022-08-22 RX ORDER — GABAPENTIN 300 MG/1
600 CAPSULE ORAL 3 TIMES DAILY
Qty: 540 CAPSULE | Refills: 3 | OUTPATIENT
Start: 2022-08-22

## 2022-08-22 NOTE — TELEPHONE ENCOUNTER
LVM for patient about appointment next Monday 8/29/22 with , due to schedule changes patients appointment needs to be rescheduled earlier in the day on 8/29/22 to 240pm or 320pm in a Procedure visit spot, IF neither of those times work patient will need to reschedule appointment to a different day. Sports number provided.

## 2022-08-30 ENCOUNTER — TELEPHONE (OUTPATIENT)
Dept: GASTROENTEROLOGY | Facility: CLINIC | Age: 57
End: 2022-08-30

## 2022-08-30 ENCOUNTER — TELEPHONE (OUTPATIENT)
Dept: TRANSPLANT | Facility: CLINIC | Age: 57
End: 2022-08-30

## 2022-08-30 DIAGNOSIS — Z94.4 LIVER TRANSPLANT RECIPIENT (H): Primary | ICD-10-CM

## 2022-08-30 DIAGNOSIS — Z79.60 LONG-TERM USE OF IMMUNOSUPPRESSANT MEDICATION: ICD-10-CM

## 2022-08-30 NOTE — TELEPHONE ENCOUNTER
Camacho stated Dr. Camejo would like him to have a colonoscopy done.  Camacho wanted to see if you could get an order for the colonoscopy?

## 2022-08-30 NOTE — TELEPHONE ENCOUNTER
Screening Questions    BlueKIND OF PREP RedLOCATION [review exclusion criteria] GreenSEDATION TYPE    Have you had a positive covid test in the last 90 days? N  If yes, what date?     Do you have a legal guardian or medical Power of ?  Are you able to give consent for your medical care? Y  SELF (Sedation review/consideration needed)    1. Are you active on mychart? Y    2. What insurance is in the chart? p1  3. ID  [id number]  4. GRP [id number]         BC[employer] SECONDARY    3.   Ordering/Referring Provider: [facility]    4. BMI 39.3 [BMI OVER 40-EXTENDED PREP]  If greater than 40 review exclusion criteria [PAC APPT IF (MAC) @ [facility]]      5.  Respiratory Screening:  [If yes to any of the following HOSPITAL setting only]     N  Do you use daily home oxygen?   Y- USE CPAP Do you have mod to severe Obstructive Sleep Apnea?  [(OKAY @ MG if pt is not on OXYGEN) [facility] SH PH RI]   N   Do you have Pulmonary Hypertension?    N   Do you have UNCONTROLLED asthma?         6.   N Have you had a heart or lung transplant?    _____________________________    7.   N Are you currently on dialysis? [ If yes, G-PREP & HOSPITAL setting only]     8.   Y Do you have chronic kidney disease? [ If yes, G-PREP ]    9.   Y Do you have a diagnosis of diabetes? [ If yes, G-PREP ]    10. N  On a regular basis do you go 3-5 days between bowel movements?  [ If yes, EXTENDED PREP.]  _____________________________    11.    N  Have you had a stroke or Transient ischemic attack (TIA - aka  mini stroke ) within 6 months? (If yes, please review exclusion criteria)          N In the past 6 months, have you had any heart related issues including cardiomyopathy or heart attack?           N If yes, did it require cardiac stenting or other implantable device?       12.   N  Do you have any implantable devices in your body (pacemaker, defib, LVAD)? (If yes, please review exclusion criteria)    13.   N Do you take nitroglycerin?            N If yes, how often?   (if yes, HOSPITAL setting ONLY)    14.  N  Are you currently taking any blood thinners?           [IF YES, INFORM PATIENT TO FOLLOW UP W/ ORDERING PROVIDER FOR BRIDGING INSTRUCTIONS]     15.   N Do you take Phentermine?      Yes-> Hold for 7 days before procedure.  Please consult your prescribing provider if you have questions about holding this medication.      16.   [FEMALES] Are you currently pregnant?     If yes, how many weeks?   ____________________________    17.   N (OXYCODONE PRN, NOT DAILY) Are you taking any prescription pain medications on a routine schedule?  [ If yes, EXTENDED PREP.] [If yes, MAC]    18.   N Do you have any chemical dependencies such as alcohol, street drugs, or methadone?  [If yes, MAC]    19.   N  Do you have any history of post-traumatic stress syndrome, severe anxiety or history of psychosis?  [If yes, MAC]  ____________________________    20.   Do you transfer independently? (If NO, please HOSPITAL setting  only)  Y      21.   Preferred LOCAL Pharmacy for Pre Prescription:                         [facility] [address]    Scheduling Details      Caller: Camacho Bhagat    (Please ask for phone number if not scheduled by patient)    Type of Procedure Scheduled: Lower Endoscopy [Colonoscopy]    Which Colonoscopy Prep was Sent?: [facility]  JUAN CF PATIENTS & GROEN'S PATIENTS NEEDS EXTENDED PREP    Surgeon: PREM  Date of Procedure: 10/24  Location: U    Sedation Type: CS  per screening   Conscious Sedation- Needs  for 6 hours after the procedure  MAC/General-Needs  for 24 hours after procedure    Pre-op Required at [facility] [place], [place] and OR for MAC sedation:  N  (advise patient they will need a pre-op WITH IN 30 DAYS prior to procedure -)      Informed patient they will need an adult  Y  Cannot take any type of public or medical transportation alone    Pre-Procedure Covid test to be completed at [facility]  Clinics or Externally/Informed of at home test option: HOME    Confirmed Nurse will call to complete assessment Y    Additional comments:

## 2022-08-30 NOTE — TELEPHONE ENCOUNTER
Called Camacho in response to his [facility] [employer] [facility] message. Provided the number to endoscopy scheduling for him to schedule his colonoscopy. Regarding his rescheduled visit with Dr. Camejo, Camacho does not currently have any urgent issues to discuss and is OK pushing out his hepatology visit until Jan. He is already scheduled and will keep that visit.     Denies any other complaints or concerns and will reach out if anything needed from his liver team.

## 2022-09-02 ENCOUNTER — LAB (OUTPATIENT)
Dept: LAB | Facility: CLINIC | Age: 57
End: 2022-09-02
Payer: COMMERCIAL

## 2022-09-02 DIAGNOSIS — Z13.220 LIPID SCREENING: ICD-10-CM

## 2022-09-02 DIAGNOSIS — Z94.4 LIVER REPLACED BY TRANSPLANT (H): ICD-10-CM

## 2022-09-02 DIAGNOSIS — N18.30 STAGE 3 CHRONIC KIDNEY DISEASE, UNSPECIFIED WHETHER STAGE 3A OR 3B CKD (H): ICD-10-CM

## 2022-09-02 LAB
ALBUMIN SERPL-MCNC: 4 G/DL (ref 3.4–5)
ALP SERPL-CCNC: 202 U/L (ref 40–150)
ALT SERPL W P-5'-P-CCNC: 33 U/L (ref 0–70)
ANION GAP SERPL CALCULATED.3IONS-SCNC: 6 MMOL/L (ref 3–14)
AST SERPL W P-5'-P-CCNC: 25 U/L (ref 0–45)
BILIRUB DIRECT SERPL-MCNC: 0.2 MG/DL (ref 0–0.2)
BILIRUB SERPL-MCNC: 0.7 MG/DL (ref 0.2–1.3)
BUN SERPL-MCNC: 42 MG/DL (ref 7–30)
CALCIUM SERPL-MCNC: 9.5 MG/DL (ref 8.5–10.1)
CHLORIDE BLD-SCNC: 109 MMOL/L (ref 94–109)
CO2 SERPL-SCNC: 20 MMOL/L (ref 20–32)
CREAT SERPL-MCNC: 1.66 MG/DL (ref 0.66–1.25)
CREAT UR-MCNC: 20 MG/DL
ERYTHROCYTE [DISTWIDTH] IN BLOOD BY AUTOMATED COUNT: 13.6 % (ref 10–15)
GFR SERPL CREATININE-BSD FRML MDRD: 48 ML/MIN/1.73M2
GLUCOSE BLD-MCNC: 95 MG/DL (ref 70–99)
HCT VFR BLD AUTO: 35 % (ref 40–53)
HGB BLD-MCNC: 11.8 G/DL (ref 13.3–17.7)
MCH RBC QN AUTO: 31.4 PG (ref 26.5–33)
MCHC RBC AUTO-ENTMCNC: 33.7 G/DL (ref 31.5–36.5)
MCV RBC AUTO: 93 FL (ref 78–100)
PHOSPHATE SERPL-MCNC: 3.3 MG/DL (ref 2.5–4.5)
PLATELET # BLD AUTO: 135 10E3/UL (ref 150–450)
POTASSIUM BLD-SCNC: 5.7 MMOL/L (ref 3.4–5.3)
PROT SERPL-MCNC: 7.5 G/DL (ref 6.8–8.8)
PROT UR-MCNC: 0.27 G/L
PROT/CREAT 24H UR: 1.35 G/G CR (ref 0–0.2)
RBC # BLD AUTO: 3.76 10E6/UL (ref 4.4–5.9)
SODIUM SERPL-SCNC: 135 MMOL/L (ref 133–144)
WBC # BLD AUTO: 8.8 10E3/UL (ref 4–11)

## 2022-09-02 PROCEDURE — 85027 COMPLETE CBC AUTOMATED: CPT

## 2022-09-02 PROCEDURE — 82248 BILIRUBIN DIRECT: CPT

## 2022-09-02 PROCEDURE — 84156 ASSAY OF PROTEIN URINE: CPT

## 2022-09-02 PROCEDURE — 36415 COLL VENOUS BLD VENIPUNCTURE: CPT

## 2022-09-02 PROCEDURE — 84100 ASSAY OF PHOSPHORUS: CPT

## 2022-09-02 PROCEDURE — 84075 ASSAY ALKALINE PHOSPHATASE: CPT

## 2022-09-06 ENCOUNTER — TELEPHONE (OUTPATIENT)
Dept: TRANSPLANT | Facility: CLINIC | Age: 57
End: 2022-09-06

## 2022-09-06 DIAGNOSIS — Z94.4 S/P LIVER TRANSPLANT (H): Primary | ICD-10-CM

## 2022-09-06 NOTE — TELEPHONE ENCOUNTER
Creatinine and potassium elevated.  May need to reassess need for lasix.   Please call Camacho, ask him to try to lower dietary potassium, drink more fluids and repeat metabolic panel this week.

## 2022-09-06 NOTE — TELEPHONE ENCOUNTER
Left a message for pt to follow low K+ diet, drink more  fluids and repeat BMP this week and to call with confirmation.

## 2022-09-08 ENCOUNTER — VIRTUAL VISIT (OUTPATIENT)
Dept: NEPHROLOGY | Facility: CLINIC | Age: 57
End: 2022-09-08
Payer: COMMERCIAL

## 2022-09-08 VITALS — SYSTOLIC BLOOD PRESSURE: 147 MMHG | BODY MASS INDEX: 39.32 KG/M2 | DIASTOLIC BLOOD PRESSURE: 82 MMHG | WEIGHT: 290 LBS

## 2022-09-08 DIAGNOSIS — E87.5 HYPERKALEMIA: ICD-10-CM

## 2022-09-08 DIAGNOSIS — N18.31 STAGE 3A CHRONIC KIDNEY DISEASE (H): Primary | ICD-10-CM

## 2022-09-08 DIAGNOSIS — D63.1 ANEMIA OF CHRONIC RENAL FAILURE, STAGE 3A (H): ICD-10-CM

## 2022-09-08 DIAGNOSIS — E55.9 VITAMIN D DEFICIENCY: ICD-10-CM

## 2022-09-08 DIAGNOSIS — N18.31 ANEMIA OF CHRONIC RENAL FAILURE, STAGE 3A (H): ICD-10-CM

## 2022-09-08 DIAGNOSIS — R80.1 PERSISTENT PROTEINURIA: ICD-10-CM

## 2022-09-08 DIAGNOSIS — E11.21 CONTROLLED TYPE 2 DIABETES MELLITUS WITH DIABETIC NEPHROPATHY, UNSPECIFIED WHETHER LONG TERM INSULIN USE (H): ICD-10-CM

## 2022-09-08 DIAGNOSIS — I10 BENIGN ESSENTIAL HYPERTENSION: ICD-10-CM

## 2022-09-08 PROCEDURE — 99214 OFFICE O/P EST MOD 30 MIN: CPT | Mod: 95

## 2022-09-08 RX ORDER — FUROSEMIDE 40 MG
TABLET ORAL
Qty: 180 TABLET | Refills: 3 | Status: SHIPPED | OUTPATIENT
Start: 2022-09-08 | End: 2023-08-11

## 2022-09-08 RX ORDER — DOXAZOSIN 2 MG/1
2 TABLET ORAL AT BEDTIME
Qty: 90 TABLET | Refills: 3 | Status: SHIPPED | OUTPATIENT
Start: 2022-09-08 | End: 2023-02-22

## 2022-09-08 ASSESSMENT — PAIN SCALES - GENERAL: PAINLEVEL: NO PAIN (0)

## 2022-09-08 NOTE — PROGRESS NOTES
Nephrology Telephone Visit 9/8/22    Assessment and Plan:       1. CKD3a w/mild proteinuria - Stable. Creat 1.6, eGFR 48 ml/mn.  UPCR down to 1.3 g/gCr from 2.2 g/g/Cr last visit following addition of Losartan  Blood pressures uncontrolled   Baseline remains in the low to mid 1 range since 2019.    Etiology of CKD likely multifactorial; DM, recurrent EJ, CNI.    - Had been intolerant of ACE/ARB previously given problems with hyperkalemia assoc with Tacrolimus, but retrial of ACE/ARB when TAC level was <0.3 did not result in Hyperkalemia. In fact Valtessa had been discontinued because he was becoming hypokalemic.    - Patient was restarted on ARB in 4/18 for unexplained nephrotic range proteinuria following ostomy takedown with improvement in UPCR, but then developed mild acute rejection and Tac dose was increased resulting in hyperkalemia. He was restarted on Valtassa with improvement in hyperkalemia and Losartan was discontinued again in 3/19   - We are retrying Losartan and began 25 mg every day following the 5/22 visit. His K has been fine until this set of labs. Proteinuria has improved despite no improvement in his blood pressure control.     - Does not use NSAIDs.    - A1c goal is 7%. HbA1c 5.2 % (3/22)       2. HTN/ - Still not at goal. Home blood pressures in the 140 - 150's/. No dyspnea but reports edema. Continues to gain weight.    Current antihypertensive regimen:      Lasix 40 mg bid     Amlodipine 10 mg qd      Toprol  mg every day     Losartan 25 mg every day       - Increase Furosemide to 60/40       - Begin Doxazosin 2 mg HS       - Labs 2 wks       - No improvement despite CPAP       - Strongly encouraged weight loss       - Will send in b/p readings in 1-2 wks      3. Hyperkalemia assoc with Tac/ARB - K 5.7. Likely combination of ARB/TAC/Food. .    - Continue Valtassa 8.4 g/day.    - If no improvement with next set of labs will increase dose of Valtessa   - He tries to modify his diet  w/respect to K foods      4. Volume status - Camacho reports bilateral LE edema/No dyspnea. Weight 290# up from 287#, 284#, 250#. Was 242# in 12/20. His weight gain is not generally assoc with hypervolemia however today he reports edema. Blood pressures uncontrolled   - Increase Lasix to 60/40 mg bid.    - Work on weight loss      5. Acid base - No acute concerns. Metabolic acidosis resolved following ileostomy takedown. Bicarb 20      6. BMD - Ca 9.5, Phos 3.1, albumin 4.0   - Vitamin D 41, PTH 28 (11/21)    - Continue Vit D 1000 U qd      7.Anemia -Hgb 11.8    - Iron studies 5/21: Ferritin 935, Fe 69, IS 26   - Had colonoscopy 2017 (poor study), Ileoscopy 8/17 - nml. Having repeat colonoscopy next month   - Not on iron and has not needed DIPAK      8. Liver transplant IS: Tacrolimus, MMF, Pred. Tac level 5.3   - Per transplant      9. Dispo- RCT 6 months for f/u.       Reason for Visit:  CKD3a/HTN follow up        HPI:  Mr Bhagat is a 55 yo male with CKD3a, HTN, Chronic hyperkalemia, DM2, Liver transplant, present today for CKD/HTN followup. Last seen in clinic by me on 5/27/22. Losartan 25 mg every day started and Valtessa restarted.  Baseline creat low to mid 1's.       Interval Hx:     No hospital admissions.       ROS:  Camacho reports no improvement with b/p. Home readings 140-150/ despite addition of Losartan last visit.   Potassium back up on this set of labs despite taking his Valtessa   Finding it hard to exercise outside of his work day given long hours with commute. Really wants to find a regimen that will support weight loss  Using his CPAP   DM control improved with Insulin pump.    Appetite good  Energy level improved after starting CPAP  Reports edema.   Denies dyspnea,  CP, abdominal pain. Voiding w/o difficulty.   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 Former smoker,   by profession. Exercise is limited due to hip/knee pain.   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       Family Hx:   Family History   Problem Relation Age of Onset     Diabetes Father      Hypertension Father      Substance Abuse Father      Arthritis Mother      Thyroid Cancer Mother         Survivor!     Cervical Cancer Maternal Grandmother      Cerebrovascular Disease Maternal Grandfather      No Known Problems Paternal Grandmother      Prostate Cancer Paternal Grandfather      Colon Cancer No family hx of      Hyperlipidemia No family hx of      Coronary Artery Disease No family hx of      Breast Cancer No family hx of        Allergies:  Allergies   Allergen Reactions     Erythromycin GI Disturbance     Losartan Other (See Comments)     Consistently develops hyperkalemia     Vioxx      Nausea, vomiting       Medications:  Current Outpatient Medications   Medication Sig     alcohol swab prep pads Use to swab area of injection/francisca as directed.     alpha-lipoic acid 600 MG capsule Take 600 mg by mouth     amLODIPine (NORVASC) 10 MG tablet Take 1 tablet (10 mg) by mouth daily     blood glucose (NO BRAND SPECIFIED) lancets standard Use to test blood sugar 1 times daily or as directed.     blood glucose (NO BRAND SPECIFIED) test strip Use to test blood sugar 1 times daily or as directed.     blood glucose (NO BRAND SPECIFIED) test strip Use to test blood sugar 3 times daily or as directed. Any covered brand preferred by Pt that works with meter.     blood glucose calibration (NO BRAND SPECIFIED) solution Use to calibrate meter.     blood glucose monitoring (NO BRAND SPECIFIED) meter device kit Use to test blood sugar 3  times daily or as directed. Any covered brand preferred by Pt.     Blood Glucose Monitoring Suppl (CONTOUR BLOOD GLUCOSE SYSTEM) w/Device KIT For use with the Omnipod Dash insulin pump     cholecalciferol (VITAMIN D3) 1000 UNIT tablet Take 1 tablet (1,000 Units) by mouth daily (Patient taking differently: Take 1,000 Units by mouth every morning)     Continuous Blood Gluc Sensor (DEXCOM G6 SENSOR) MISC Change every 10 days.     Continuous Blood Gluc Transmit (DEXCOM G6 TRANSMITTER) MISC Change every 3 months.     cyclobenzaprine (FLEXERIL) 10 MG tablet Take 1 tablet (10 mg) by mouth 3 times daily as needed for muscle spasms     doxazosin (CARDURA) 2 MG tablet Take 1 tablet (2 mg) by mouth At Bedtime     fluticasone (FLONASE) 50 MCG/ACT nasal spray Spray 1 spray into both nostrils daily     furosemide (LASIX) 40 MG tablet Take 60 mg AM and 40 mg afternoon     gabapentin (NEURONTIN) 300 MG capsule Take 2 capsules (600 mg) by mouth 3 times daily     Glucose Blood (BLOOD GLUCOSE TEST STRIPS) STRP 1 strip by Lancet route 3 times daily     Insulin Disposable Pump (OMNIPOD DASH 5 PACK PODS) MISC 1 each every 48 hours     Insulin Disposable Pump (OMNIPOD DASH 5 PACK PODS) MISC 1 each every 3 days     Insulin Lispro (HUMALOG KWIKPEN) 200 UNIT/ML soln To be used in the insulin pump. Total daily dose up to 170 U.     insulin pen needle (BD ENE U/F) 32G X 4 MM miscellaneous Use 1 pen needle 4 times daily or as directed.     lidocaine (LIDODERM) 5 % patch Place 1 patch onto the skin every 24 hours To prevent lidocaine toxicity, patient should be patch free for 12 hrs daily.     losartan (COZAAR) 25 MG tablet Take 1 tablet (25 mg) by mouth daily     metoprolol succinate ER (TOPROL-XL) 200 MG 24 hr tablet Take 1 tablet (200 mg) by mouth daily     mycophenolic acid (GENERIC EQUIVALENT) 360 MG EC tablet TAKE 2 TABLETS (720 MG) BY MOUTH EVERY 12 HOURS     oxyCODONE (ROXICODONE) 5 MG tablet Take 1 tablet (5 mg) by mouth 4 times daily  as needed for pain maximum 6 tablet(s) per day     patiromer (VELTASSA) 8.4 g packet Take 8.4 g by mouth daily     predniSONE (DELTASONE) 2.5 MG tablet TAKE 1 TABLET BY MOUTH EVERY DAY     sildenafil (REVATIO) 20 MG tablet Take 2 tablets (40 mg) by mouth daily as needed (erectile dysfunction)     tacrolimus (GENERIC EQUIVALENT) 1 MG capsule Take 4 capsules (4 mg) by mouth every morning AND 3 capsules (3 mg) every evening.     thin (NO BRAND SPECIFIED) lancets Use with lanceting device. Any covered brand.     ursodiol (ACTIGALL) 500 MG tablet TAKE 1 TABLET BY MOUTH TWICE A DAY     No current facility-administered medications for this visit.      Vitals:  No vitals obtained    Exam:  No exam performed    LABS:   CMP  Recent Labs   Lab Test 09/02/22  1143 07/19/22  1153 05/24/22  1041 03/03/22  1126 08/18/21  1236 06/08/21  1628 05/27/21  1729 03/31/21  1145 02/12/21  1331    136 138 138   < > 134 131* 138 138   POTASSIUM 5.7* 4.6 5.0 4.4   < > 4.9 5.1 4.5 5.8*   CHLORIDE 109 108 112* 108   < > 105 102 109 111*   CO2 20 20 24 24   < > 23 23 22 22   ANIONGAP 6 8 2* 6   < > 6 6 7 5   GLC 95 86 101* 126*   < > 249* 335* 134* 251*   BUN 42* 43* 36* 48*   < > 43* 50* 54* 44*   CR 1.66* 1.51* 1.44* 1.66*   < > 1.51* 1.53* 1.56* 1.32*   GFRESTIMATED 48* 54* 57* 48*   < > 51* 50* 49* 60*   GFRESTBLACK  --   --   --   --   --  59* 58* 57* 69   JACOB 9.5 9.0 8.7 8.9   < > 8.9 9.1 8.9 8.7    < > = values in this interval not displayed.     Recent Labs   Lab Test 09/02/22  1143 07/19/22  1153 05/24/22  1041 03/03/22  1126   BILITOTAL 0.7 0.6 0.7 0.7   ALKPHOS 202* 196* 203* 215*   ALT 33 32 31 34   AST 25 25 25 22     CBC  Recent Labs   Lab Test 09/02/22  1143 07/19/22  1153 05/24/22  1041 03/03/22  1126   HGB 11.8* 12.4* 12.1* 13.3   WBC 8.8 7.5 7.0 8.3   RBC 3.76* 4.01* 4.00* 4.39*   HCT 35.0* 36.4* 37.5* 39.7*   MCV 93 91 94 90   MCH 31.4 30.9 30.3 30.3   MCHC 33.7 34.1 32.3 33.5   RDW 13.6 13.2 13.7 13.4   * 144*  117* 135*     URINE STUDIES  Recent Labs   Lab Test 05/24/22  1035 11/24/20  1325 04/02/20  1230 03/27/19  1155   COLOR Light Yellow Yellow Light Yellow Yellow   APPEARANCE Clear Clear Clear Clear   URINEGLC Negative Negative Negative 70*   URINEBILI Negative Negative Negative Negative   URINEKETONE Negative Negative Negative Negative   SG 1.014 1.014 1.014 1.014   UBLD Trace* Negative Negative Negative   URINEPH 5.5 5.0 5.0 5.0   PROTEIN 100 * 20* 10* 30*   NITRITE Negative Negative Negative Negative   LEUKEST Negative Negative Negative Negative   RBCU 1 1 <1 1   WBCU 1 <1 <1 0     Recent Labs   Lab Test 09/02/22  1143 05/24/22  1035 03/03/22  1145 12/01/21  1337   UTPG 1.35* 2.26* 1.58* 0.76*     PTH  Recent Labs   Lab Test 12/01/21  1359 11/24/20  1345 08/06/19  1234   PTHI 28 29 22     IRON STUDIES  Recent Labs   Lab Test 05/27/21  1729 11/24/20  1345 08/21/20  1510   IRON 69 105 123    249 235*   IRONSAT 26 42 52*   NELY 935* 1,104* 1,098*       Cinda Cazares, NP

## 2022-09-08 NOTE — LETTER
9/8/2022       RE: Camacho Bhagat  [address]  [facility] [address]     Dear Colleague,    Thank you for referring your patient, Camacho Bhagat, to the [facility] [place] at [facility]. Please see a copy of my visit note below.    Camacho is a 56 year old who is being evaluated via a billable telephone visit.      What phone number would you like to be contacted at? [phone]     How would you like to obtain your AVS? [employer]  Phone call duration: 23 minutes    Nephrology Telephone Visit 9/8/22    Assessment and Plan:       1. CKD3a w/mild proteinuria - Stable. Creat 1.6, eGFR 48 ml/mn.  UPCR down to 1.3 g/gCr from 2.2 g/g/Cr last visit following addition of Losartan  Blood pressures uncontrolled   Baseline remains in the low to mid 1 range since 2019.    Etiology of CKD likely multifactorial; DM, recurrent EJ, CNI.    - Had been intolerant of ACE/ARB previously given problems with hyperkalemia assoc with Tacrolimus, but retrial of ACE/ARB when TAC level was <0.3 did not result in Hyperkalemia. In fact Valtessa had been discontinued because he was becoming hypokalemic.    - Patient was restarted on ARB in 4/18 for unexplained nephrotic range proteinuria following ostomy takedown with improvement in UPCR, but then developed mild acute rejection and Tac dose was increased resulting in hyperkalemia. He was restarted on Valtassa with improvement in hyperkalemia and Losartan was discontinued again in 3/19   - We are retrying Losartan and began 25 mg every day following the 5/22 visit. His K has been fine until this set of labs. Proteinuria has improved despite no improvement in his blood pressure control.     - Does not use NSAIDs.    - A1c goal is 7%. HbA1c 5.2 % (3/22)       2. HTN/ - Still not at goal. Home blood pressures in the 140 - 150's/. No dyspnea but reports edema. Continues to gain weight.    Current antihypertensive  regimen:      Lasix 40 mg bid     Amlodipine 10 mg qd      Toprol  mg every day     Losartan 25 mg every day       - Increase Furosemide to 60/40       - Begin Doxazosin 2 mg HS       - Labs 2 wks       - No improvement despite CPAP       - Strongly encouraged weight loss       - Will send in b/p readings in 1-2 wks      3. Hyperkalemia assoc with Tac/ARB - K 5.7. Likely combination of ARB/TAC/Food. .    - Continue Valtassa 8.4 g/day.    - If no improvement with next set of labs will increase dose of Valtessa   - He tries to modify his diet w/respect to K foods      4. Volume status - Camacho reports bilateral LE edema/No dyspnea. Weight 290# up from 287#, 284#, 250#. Was 242# in 12/20. His weight gain is not generally assoc with hypervolemia however today he reports edema. Blood pressures uncontrolled   - Increase Lasix to 60/40 mg bid.    - Work on weight loss      5. Acid base - No acute concerns. Metabolic acidosis resolved following ileostomy takedown. Bicarb 20      6. BMD - Ca 9.5, Phos 3.1, albumin 4.0   - Vitamin D 41, PTH 28 (11/21)    - Continue Vit D 1000 U qd      7.Anemia -Hgb 11.8    - Iron studies 5/21: Ferritin 935, Fe 69, IS 26   - Had colonoscopy 2017 (poor study), Ileoscopy 8/17 - nml. Having repeat colonoscopy next month   - Not on iron and has not needed DIPAK      8. Liver transplant IS: Tacrolimus, MMF, Pred. Tac level 5.3   - Per transplant      9. Dispo- RCT 6 months for f/u.       Reason for Visit:  CKD3a/HTN follow up        HPI:  Mr Bhagat is a 57 yo male with CKD3a, HTN, Chronic hyperkalemia, DM2, Liver transplant, present today for CKD/HTN followup. Last seen in clinic by me on 5/27/22. Losartan 25 mg every day started and Valtessa restarted.  Baseline creat low to mid 1's.       Interval Hx:     No hospital admissions.       ROS:  Camacho reports no improvement with b/p. Home readings 140-150/ despite addition of Losartan last visit.   Potassium back up on this set of labs despite  taking his Valtessa   Finding it hard to exercise outside of his work day given long hours with commute. Really wants to find a regimen that will support weight loss  Using his CPAP   DM control improved with Insulin pump.    Appetite good  Energy level improved after starting CPAP  Reports edema.   Denies dyspnea,  CP, abdominal pain. Voiding w/o difficulty.   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 Former smoker,   by profession. Exercise is limited due to hip/knee pain.   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       Family Hx:   Family History   Problem Relation Age of Onset     Diabetes Father      Hypertension Father      Substance Abuse Father      Arthritis Mother      Thyroid Cancer Mother         Survivor!     Cervical Cancer Maternal Grandmother      Cerebrovascular Disease Maternal Grandfather      No Known Problems Paternal Grandmother      Prostate Cancer Paternal Grandfather      Colon Cancer No family hx of      Hyperlipidemia No family hx of      Coronary Artery Disease No family hx of      Breast Cancer No family hx of        Allergies:  Allergies   Allergen Reactions     Erythromycin GI Disturbance     Losartan Other (See Comments)     Consistently develops hyperkalemia     Vioxx      Nausea, vomiting       Medications:  Current Outpatient Medications   Medication Sig     alcohol swab prep pads Use to swab area of injection/francisca as directed.     alpha-lipoic acid 600 MG capsule Take 600 mg by mouth     amLODIPine (NORVASC) 10 MG tablet Take 1 tablet (10 mg) by mouth daily      blood glucose (NO BRAND SPECIFIED) lancets standard Use to test blood sugar 1 times daily or as directed.     blood glucose (NO BRAND SPECIFIED) test strip Use to test blood sugar 1 times daily or as directed.     blood glucose (NO BRAND SPECIFIED) test strip Use to test blood sugar 3 times daily or as directed. Any covered brand preferred by Pt that works with meter.     blood glucose calibration (NO BRAND SPECIFIED) solution Use to calibrate meter.     blood glucose monitoring (NO BRAND SPECIFIED) meter device kit Use to test blood sugar 3 times daily or as directed. Any covered brand preferred by Pt.     Blood Glucose Monitoring Suppl (CONTOUR BLOOD GLUCOSE SYSTEM) w/Device KIT For use with the Omnipod Dash insulin pump     cholecalciferol (VITAMIN D3) 1000 UNIT tablet Take 1 tablet (1,000 Units) by mouth daily (Patient taking differently: Take 1,000 Units by mouth every morning)     Continuous Blood Gluc Sensor (DEXCOM G6 SENSOR) MISC Change every 10 days.     Continuous Blood Gluc Transmit (DEXCOM G6 TRANSMITTER) MISC Change every 3 months.     cyclobenzaprine (FLEXERIL) 10 MG tablet Take 1 tablet (10 mg) by mouth 3 times daily as needed for muscle spasms     doxazosin (CARDURA) 2 MG tablet Take 1 tablet (2 mg) by mouth At Bedtime     fluticasone (FLONASE) 50 MCG/ACT nasal spray Spray 1 spray into both nostrils daily     furosemide (LASIX) 40 MG tablet Take 60 mg AM and 40 mg afternoon     gabapentin (NEURONTIN) 300 MG capsule Take 2 capsules (600 mg) by mouth 3 times daily     Glucose Blood (BLOOD GLUCOSE TEST STRIPS) STRP 1 strip by Lancet route 3 times daily     Insulin Disposable Pump (OMNIPOD DASH 5 PACK PODS) MISC 1 each every 48 hours     Insulin Disposable Pump (OMNIPOD DASH 5 PACK PODS) MISC 1 each every 3 days     Insulin Lispro (HUMALOG KWIKPEN) 200 UNIT/ML soln To be used in the insulin pump. Total daily dose up to 170 U.     insulin pen needle (BD ENE U/F) 32G X 4 MM miscellaneous Use 1 pen  needle 4 times daily or as directed.     lidocaine (LIDODERM) 5 % patch Place 1 patch onto the skin every 24 hours To prevent lidocaine toxicity, patient should be patch free for 12 hrs daily.     losartan (COZAAR) 25 MG tablet Take 1 tablet (25 mg) by mouth daily     metoprolol succinate ER (TOPROL-XL) 200 MG 24 hr tablet Take 1 tablet (200 mg) by mouth daily     mycophenolic acid (GENERIC EQUIVALENT) 360 MG EC tablet TAKE 2 TABLETS (720 MG) BY MOUTH EVERY 12 HOURS     oxyCODONE (ROXICODONE) 5 MG tablet Take 1 tablet (5 mg) by mouth 4 times daily as needed for pain maximum 6 tablet(s) per day     patiromer (VELTASSA) 8.4 g packet Take 8.4 g by mouth daily     predniSONE (DELTASONE) 2.5 MG tablet TAKE 1 TABLET BY MOUTH EVERY DAY     sildenafil (REVATIO) 20 MG tablet Take 2 tablets (40 mg) by mouth daily as needed (erectile dysfunction)     tacrolimus (GENERIC EQUIVALENT) 1 MG capsule Take 4 capsules (4 mg) by mouth every morning AND 3 capsules (3 mg) every evening.     thin (NO BRAND SPECIFIED) lancets Use with lanceting device. Any covered brand.     ursodiol (ACTIGALL) 500 MG tablet TAKE 1 TABLET BY MOUTH TWICE A DAY     No current facility-administered medications for this visit.      Vitals:  No vitals obtained    Exam:  No exam performed    LABS:   CMP  Recent Labs   Lab Test 09/02/22  1143 07/19/22  1153 05/24/22  1041 03/03/22  1126 08/18/21  1236 06/08/21  1628 05/27/21  1729 03/31/21  1145 02/12/21  1331    136 138 138   < > 134 131* 138 138   POTASSIUM 5.7* 4.6 5.0 4.4   < > 4.9 5.1 4.5 5.8*   CHLORIDE 109 108 112* 108   < > 105 102 109 111*   CO2 20 20 24 24   < > 23 23 22 22   ANIONGAP 6 8 2* 6   < > 6 6 7 5   GLC 95 86 101* 126*   < > 249* 335* 134* 251*   BUN 42* 43* 36* 48*   < > 43* 50* 54* 44*   CR 1.66* 1.51* 1.44* 1.66*   < > 1.51* 1.53* 1.56* 1.32*   GFRESTIMATED 48* 54* 57* 48*   < > 51* 50* 49* 60*   GFRESTBLACK  --   --   --   --   --  59* 58* 57* 69   JACOB 9.5 9.0 8.7 8.9   < > 8.9 9.1  8.9 8.7    < > = values in this interval not displayed.     Recent Labs   Lab Test 09/02/22  1143 07/19/22  1153 05/24/22  1041 03/03/22  1126   BILITOTAL 0.7 0.6 0.7 0.7   ALKPHOS 202* 196* 203* 215*   ALT 33 32 31 34   AST 25 25 25 22     CBC  Recent Labs   Lab Test 09/02/22  1143 07/19/22  1153 05/24/22  1041 03/03/22  1126   HGB 11.8* 12.4* 12.1* 13.3   WBC 8.8 7.5 7.0 8.3   RBC 3.76* 4.01* 4.00* 4.39*   HCT 35.0* 36.4* 37.5* 39.7*   MCV 93 91 94 90   MCH 31.4 30.9 30.3 30.3   MCHC 33.7 34.1 32.3 33.5   RDW 13.6 13.2 13.7 13.4   * 144* 117* 135*     URINE STUDIES  Recent Labs   Lab Test 05/24/22  1035 11/24/20  1325 04/02/20  1230 03/27/19  1155   COLOR Light Yellow Yellow Light Yellow Yellow   APPEARANCE Clear Clear Clear Clear   URINEGLC Negative Negative Negative 70*   URINEBILI Negative Negative Negative Negative   URINEKETONE Negative Negative Negative Negative   SG 1.014 1.014 1.014 1.014   UBLD Trace* Negative Negative Negative   URINEPH 5.5 5.0 5.0 5.0   PROTEIN 100 * 20* 10* 30*   NITRITE Negative Negative Negative Negative   LEUKEST Negative Negative Negative Negative   RBCU 1 1 <1 1   WBCU 1 <1 <1 0     Recent Labs   Lab Test 09/02/22  1143 05/24/22  1035 03/03/22  1145 12/01/21  1337   UTPG 1.35* 2.26* 1.58* 0.76*     PTH  Recent Labs   Lab Test 12/01/21  1359 11/24/20  1345 08/06/19  1234   PTHI 28 29 22     IRON STUDIES  Recent Labs   Lab Test 05/27/21  1729 11/24/20  1345 08/21/20  1510   IRON 69 105 123    249 235*   IRONSAT 26 42 52*   NELY 935* 1,104* 1,098*       Cinda Cazares, NP

## 2022-09-12 DIAGNOSIS — Z79.60 LONG-TERM USE OF IMMUNOSUPPRESSANT MEDICATION: ICD-10-CM

## 2022-09-12 DIAGNOSIS — Z94.4 HISTORY OF LIVER TRANSPLANT (H): ICD-10-CM

## 2022-09-12 RX ORDER — MYCOPHENOLIC ACID 360 MG/1
720 TABLET, DELAYED RELEASE ORAL EVERY 12 HOURS
Qty: 360 TABLET | Refills: 3 | Status: SHIPPED | OUTPATIENT
Start: 2022-09-12 | End: 2023-01-31

## 2022-09-13 ENCOUNTER — TELEPHONE (OUTPATIENT)
Dept: NEPHROLOGY | Facility: CLINIC | Age: 57
End: 2022-09-13

## 2022-09-13 ENCOUNTER — OFFICE VISIT (OUTPATIENT)
Dept: ORTHOPEDICS | Facility: CLINIC | Age: 57
End: 2022-09-13
Attending: INTERNAL MEDICINE
Payer: COMMERCIAL

## 2022-09-13 VITALS — BODY MASS INDEX: 40.5 KG/M2 | WEIGHT: 299 LBS | HEIGHT: 72 IN

## 2022-09-13 DIAGNOSIS — M70.61 TROCHANTERIC BURSITIS OF BOTH HIPS: ICD-10-CM

## 2022-09-13 DIAGNOSIS — M54.41 CHRONIC BILATERAL LOW BACK PAIN WITH BILATERAL SCIATICA: ICD-10-CM

## 2022-09-13 DIAGNOSIS — M25.561 CHRONIC PAIN OF RIGHT KNEE: ICD-10-CM

## 2022-09-13 DIAGNOSIS — M25.551 BILATERAL HIP PAIN: ICD-10-CM

## 2022-09-13 DIAGNOSIS — G89.29 CHRONIC PAIN OF RIGHT KNEE: ICD-10-CM

## 2022-09-13 DIAGNOSIS — G89.29 CHRONIC BILATERAL LOW BACK PAIN WITH BILATERAL SCIATICA: ICD-10-CM

## 2022-09-13 DIAGNOSIS — M70.62 TROCHANTERIC BURSITIS OF BOTH HIPS: ICD-10-CM

## 2022-09-13 DIAGNOSIS — M54.42 CHRONIC BILATERAL LOW BACK PAIN WITH BILATERAL SCIATICA: ICD-10-CM

## 2022-09-13 DIAGNOSIS — M25.552 BILATERAL HIP PAIN: ICD-10-CM

## 2022-09-13 DIAGNOSIS — M51.369 DDD (DEGENERATIVE DISC DISEASE), LUMBAR: Primary | ICD-10-CM

## 2022-09-13 DIAGNOSIS — M51.16 LUMBAR DISC HERNIATION WITH RADICULOPATHY: ICD-10-CM

## 2022-09-13 PROCEDURE — 99214 OFFICE O/P EST MOD 30 MIN: CPT | Mod: 25 | Performed by: PREVENTIVE MEDICINE

## 2022-09-13 PROCEDURE — 20611 DRAIN/INJ JOINT/BURSA W/US: CPT | Mod: 50 | Performed by: PREVENTIVE MEDICINE

## 2022-09-13 RX ADMIN — TRIAMCINOLONE ACETONIDE 40 MG: 40 INJECTION, SUSPENSION INTRA-ARTICULAR; INTRAMUSCULAR at 16:51

## 2022-09-13 RX ADMIN — LIDOCAINE HYDROCHLORIDE 3 ML: 10 INJECTION, SOLUTION EPIDURAL; INFILTRATION; INTRACAUDAL; PERINEURAL at 16:51

## 2022-09-13 NOTE — PROGRESS NOTES
HISTORY OF PRESENT ILLNESS  Mr. Bhagat is a pleasant 56 year old year old male who presents to clinic today with bilateral hip pain. Patient describes that his pain is over the anterior aspect of hip. He reports he was seen with Dr. Mcleod on 9/29/2022 for this issue and was referred to sports medicine for ultrasound guided hip joint injections. This was completed on 10/5/2020, patient reports he received about 1.5 years of relief. He is a medical assistant at [employer] [place] Patient denies tingling, numbing or burning sensations. He does reports bilateral thigh cramping.     Camacho explains that he has pain in lateral part of bilateral hips with more walking and prolonged sitting  His low back has bothered him for years, and recently his low back and hips have worsened and caused him to need to take more breaks    Location: low back and bilateral hips    Quality:  achy pain    Severity: 5/10 at worst    Duration: see above  Timing: occurs intermittently  Context: occurs while exercising and lifting and using the joint  Modifying factors:  resting and non-use makes it better, movement and use makes it worse  Associated signs & symptoms: radiation into legs    MEDICAL HISTORY  Patient Active Problem List   Diagnosis     Carpal tunnel syndrome     Testicular hypofunction     Fibromyalgia     Sicca syndrome (H)     Medication refill- do not delete      Hepatocellular carcinoma (H)     Osteoarthritis     Rheumatoid arthritis (H)     Hyperkalemia     Liver transplant recipient (H)     Immunosuppressed status (H)     Neutropenic colitis (H)     S/P liver transplant (H)     Pancytopenia (H)     Ischemia of both lower extremities     Elevated serum creatinine     History of creation of ostomy (H)     Peristomal skin complication     Painful periwound skin     Encounter for attention to ileostomy (H)     Ileostomy in place (H)     Abscess, intra-abdominal, postoperative     Liver transplant rejection (H)     CMV  colitis (H)     Type 2 diabetes mellitus with diabetic polyneuropathy, with long-term current use of insulin (H)     Diarrhea of infectious origin (Plesiomonas shigelloides (formerly known Aeromonas shigelloides) in 3/14/2019 sample)     Hypertension secondary to other renal disorders     Chronic kidney disease, stage 3 (H)     Morbid obesity (H)       Current Outpatient Medications   Medication Sig Dispense Refill     alcohol swab prep pads Use to swab area of injection/francisca as directed. 100 each 3     alpha-lipoic acid 600 MG capsule Take 600 mg by mouth       amLODIPine (NORVASC) 10 MG tablet Take 1 tablet (10 mg) by mouth daily 90 tablet 3     blood glucose (NO BRAND SPECIFIED) lancets standard Use to test blood sugar 1 times daily or as directed. 100 lancet 3     blood glucose (NO BRAND SPECIFIED) test strip Use to test blood sugar 1 times daily or as directed. 50 strip 11     blood glucose (NO BRAND SPECIFIED) test strip Use to test blood sugar 3 times daily or as directed. Any covered brand preferred by Pt that works with meter. 300 strip 3     blood glucose calibration (NO BRAND SPECIFIED) solution Use to calibrate meter. 1 Bottle 3     blood glucose monitoring (NO BRAND SPECIFIED) meter device kit Use to test blood sugar 3 times daily or as directed. Any covered brand preferred by Pt. 1 kit 1     Blood Glucose Monitoring Suppl (CONTOUR BLOOD GLUCOSE SYSTEM) w/Device KIT For use with the Omnipod Dash insulin pump 1 kit 1     cholecalciferol (VITAMIN D3) 1000 UNIT tablet Take 1 tablet (1,000 Units) by mouth daily (Patient taking differently: Take 1,000 Units by mouth every morning) 100 tablet 3     Continuous Blood Gluc Sensor (DEXCOM G6 SENSOR) MISC Change every 10 days. 9 each 3     Continuous Blood Gluc Transmit (DEXCOM G6 TRANSMITTER) MISC Change every 3 months. 1 each 3     cyclobenzaprine (FLEXERIL) 10 MG tablet Take 1 tablet (10 mg) by mouth 3 times daily as needed for muscle spasms 90 tablet 3      doxazosin (CARDURA) 2 MG tablet Take 1 tablet (2 mg) by mouth At Bedtime 90 tablet 3     fluticasone (FLONASE) 50 MCG/ACT nasal spray Spray 1 spray into both nostrils daily 20 mL 3     furosemide (LASIX) 40 MG tablet Take 60 mg AM and 40 mg afternoon 180 tablet 3     gabapentin (NEURONTIN) 300 MG capsule Take 2 capsules (600 mg) by mouth 3 times daily 540 capsule 3     Glucose Blood (BLOOD GLUCOSE TEST STRIPS) STRP 1 strip by Lancet route 3 times daily 300 each 3     Insulin Disposable Pump (OMNIPOD DASH 5 PACK PODS) MISC 1 each every 48 hours 45 each 3     Insulin Disposable Pump (OMNIPOD DASH 5 PACK PODS) MISC 1 each every 3 days 6 each 3     Insulin Lispro (HUMALOG KWIKPEN) 200 UNIT/ML soln To be used in the insulin pump. Total daily dose up to 170 U. 72 mL 3     insulin pen needle (BD ENE U/F) 32G X 4 MM miscellaneous Use 1 pen needle 4 times daily or as directed. 400 each 1     lidocaine (LIDODERM) 5 % patch Place 1 patch onto the skin every 24 hours To prevent lidocaine toxicity, patient should be patch free for 12 hrs daily. 30 patch 11     losartan (COZAAR) 25 MG tablet Take 1 tablet (25 mg) by mouth daily 90 tablet 3     metoprolol succinate ER (TOPROL-XL) 200 MG 24 hr tablet Take 1 tablet (200 mg) by mouth daily 90 tablet 3     mycophenolic acid (GENERIC EQUIVALENT) 360 MG EC tablet Take 2 tablets (720 mg) by mouth every 12 hours 360 tablet 3     oxyCODONE (ROXICODONE) 5 MG tablet Take 1 tablet (5 mg) by mouth 4 times daily as needed for pain maximum 6 tablet(s) per day 30 tablet 0     patiromer (VELTASSA) 8.4 g packet Take 8.4 g by mouth daily 90 each 3     predniSONE (DELTASONE) 2.5 MG tablet TAKE 1 TABLET BY MOUTH EVERY DAY 90 tablet 3     sildenafil (REVATIO) 20 MG tablet Take 2 tablets (40 mg) by mouth daily as needed (erectile dysfunction) 10 tablet 11     tacrolimus (GENERIC EQUIVALENT) 1 MG capsule Take 4 capsules (4 mg) by mouth every morning AND 3 capsules (3 mg) every evening. 630 capsule 3      thin (NO BRAND SPECIFIED) lancets Use with lanceting device. Any covered brand. 300 each 3     ursodiol (ACTIGALL) 500 MG tablet TAKE 1 TABLET BY MOUTH TWICE A  tablet 3       Allergies   Allergen Reactions     Erythromycin GI Disturbance     Losartan Other (See Comments)     Consistently develops hyperkalemia     Vioxx      Nausea, vomiting       Family History   Problem Relation Age of Onset     Diabetes Father      Hypertension Father      Substance Abuse Father      Arthritis Mother      Thyroid Cancer Mother         Survivor!     Cervical Cancer Maternal Grandmother      Cerebrovascular Disease Maternal Grandfather      No Known Problems Paternal Grandmother      Prostate Cancer Paternal Grandfather      Colon Cancer No family hx of      Hyperlipidemia No family hx of      Coronary Artery Disease No family hx of      Breast Cancer No family hx of      Social History     Socioeconomic History     Marital status:      Number of children: 1   Occupational History     Occupation:     Tobacco Use     Smoking status: Former Smoker     Packs/day: 0.25     Years: 2.00     Pack years: 0.50     Types: Cigarettes     Quit date:      Years since quittin.7     Smokeless tobacco: Former User     Types: Chew     Quit date: 10/8/2015     Tobacco comment: 1 Cigar once every other month.   Substance and Sexual Activity     Alcohol use: No     Alcohol/week: 0.0 standard drinks     Comment: No alcohol since liver transplant.       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     REVIEW OF SYSTEMS (2022)  10 point ROS of systems including Constitutional, Eyes, Respiratory, Cardiovascular, Gastroenterology, Genitourinary, Integumentary, Musculoskeletal, Psychiatric,  Allergic/Immunologic were all negative except for pertinent positives noted in my HPI.     PHYSICAL EXAM  VSS  Vital Signs: Ht 1.829 m (6')   Wt 135.6 kg (299 lb)   BMI 40.55 kg/m   Patient declined being weighed. Body mass index is 40.55 kg/m .    General  - normal appearance, in no obvious distress  HEENT  - conjunctivae not injected, moist mucous membranes, normocephalic/atraumatic head, ears normal appearance, no lesions, mouth normal appearance, no scars, normal dentition and teeth present  CV  - normal peripheral perfusion  Pulm  - normal respiratory pattern, non-labored  Musculoskeletal - lumbar spine  - stance: normal gait without limp, no obvious leg length discrepancy, normal heel and toe walk  - inspection: normal bone and joint alignment, no obvious scoliosis  - palpation: no paravertebral or bony tenderness  - ROM: flexion exacerbates pain, normal extension, sidebending, rotation  - strength: lower extremities 5/5 in all planes  - special tests:  (+) straight leg raise  (+) slump test  Neuro  - patellar and Achilles DTRs 2+ bilaterally, lower extremity sensory deficit throughout L5 distribution, grossly normal coordination, normal muscle tone  Skin  - no ecchymosis, erythema, warmth, or induration, no obvious rash  Psych  - interactive, appropriate, normal mood and affect  Bilateral hips: no groin pain on ROM exam, has pain with external and internal rotation in Bursa area as well as to palpation over bilateral trochanteric bursa  ASSESSMENT & PLAN  55 yo overweight male with bilateral trochanteric bursitis, lumbar ddd, disc herniations, radicular pain, worse    I independently reviewed the following imaging studies:  Lumbar xrays: shows ddd  Bilateral hip xrays: shows no significant arthritis bilateral hip joints  After a 20 minute discussion and examination, we decided to perform a same day injection for diagnostic and therapeutic purposes for bilateral hip bursa  Discussed and ordered lumbar  MRI  Given HEP  Cont. Flexeril PRN nightly  F/u after lumbar MRI  Consider DANNY and PT    Patient has been doing home exercise physical therapy program for this problem      Appropriate PPE was utilized for prevention of spread of Covid-19.  Ronaldo Doty MD, CAQSM    Trochanteric bilateral Hip Injection - Ultrasound Guided  The patient was informed of the risks and the benefits of the procedure and a written consent was signed.  The patient s bilateral  lateral hip was prepped with chlorhexidine in sterile fashion.   40 mg of triamcinolone suspension was drawn up into a 5 mL syringe with 4 mL of 1% lidocaine.  Injection was performed using sterile technique.  Under ultrasound guidance a 3.5-inch 22-gauge needle was used to enter the patricia-trochanteric tissue.  Needle placement was visualized and documented with ultrasound which was deemed necessary to ensure medication did not enter the tendon itself, which could potentially cause tendon damage.   Injection performed long axis to the probe with probe in short axis to the greater trochanter.  Expansion of the patricia-trochanteric tissue was visualized under ultrasound upon injection.  Images were permanently stored for the patient's record.  There were no complications. The patient tolerated the procedure well. There was negligible bleeding.       Large Joint Injection: bilateral greater trochanteric bursa    Date/Time: 9/13/2022 4:51 PM  Performed by: Ronaldo Doty MD  Authorized by: Ronaldo Doty MD     Indications:  Pain, osteoarthritis and diagnostic evaluation  Needle Size:  22 G  Guidance: ultrasound    Approach:  Lateral  Location:  Hip  Laterality:  Bilateral      Site:  Bilateral greater trochanteric bursa  Medications (Right):  40 mg triamcinolone 40 MG/ML; 3 mL lidocaine (PF) 1 %  Medications (Left):  40 mg triamcinolone 40 MG/ML; 3 mL lidocaine (PF) 1 %  Outcome:  Tolerated well, no immediate complications  Procedure discussed: discussed  risks, benefits, and alternatives    Consent Given by:  Patient  Timeout: timeout called immediately prior to procedure    Prep: patient was prepped and draped in usual sterile fashion

## 2022-09-13 NOTE — NURSING NOTE
[address] [address]  Dept: [phone]  ______________________________________________________________________________    Patient: Camacho Bhagat   : 1965   MRN: [id number]   2022    INVASIVE PROCEDURE SAFETY CHECKLIST    Date: 2022   Procedure: Bilateral greater trochanteric bursa injection with kenalog and USG  Patient Name: Camacho Bhagat  MRN: [id number]  YOB: 1965    Action: Complete sections as appropriate. Any discrepancy results in a HARD COPY until resolved.     PRE PROCEDURE:  Patient ID verified with 2 identifiers (name and  or MRN): Yes  Procedure and site verified with patient/designee (when able): Yes  Accurate consent documentation in medical record: Yes  H&P (or appropriate assessment) documented in medical record: Yes  H&P must be up to 20 days prior to procedure and updates within 24 hours of procedure as applicable: NA  Relevant diagnostic and radiology test results appropriately labeled and displayed as applicable: NA  Procedure site(s) marked with provider initials: NA    TIMEOUT:  Time-Out performed immediately prior to starting procedure, including verbal and active participation of all team members addressing the following:Yes  * Correct patient identify  * Confirmed that the correct side and site are marked  * An accurate procedure consent form  * Agreement on the procedure to be done  * Correct patient position  * Relevant images and results are properly labeled and appropriately displayed  * The need to administer antibiotics or fluids for irrigation purposes during the procedure as applicable   * Safety precautions based on patient history or medication use    DURING PROCEDURE: Verification of correct person, site, and procedures any time the responsibility for care of the patient is transferred to another member of the care team.       Prior to injection, verified patient  identity using patient's name and date of birth.  Due to injection administration, patient instructed to remain in clinic for 15 minutes  afterwards, and to report any adverse reaction to me immediately.    Bursa injection was performed.      Lido  Drug Amount Wasted:  Yes: 4 mg/ml   Vial/Syringe: Single dose vial  Expiration Date:  06/01/2026    Kenalog  Drug Amount Wasted:  No   Vial/Syringe: Single dose vial  Expiration Date: 05/01/2024    Nicole Mckenna, ATC  September 13, 2022

## 2022-09-13 NOTE — LETTER
9/13/2022      RE: Camacho Bhagat  [address] [facility] [address]     Dear Colleague,    Thank you for referring your patient, Camacho Bhagat, to the [facility] [place]. Please see a copy of my visit note below.    HISTORY OF PRESENT ILLNESS  Mr. Bhagat is a pleasant 56 year old year old male who presents to clinic today with bilateral hip pain. Patient describes that his pain is over the anterior aspect of hip. He reports he was seen with Dr. Mcleod on 9/29/2022 for this issue and was referred to sports medicine for ultrasound guided hip joint injections. This was completed on 10/5/2020, patient reports he received about 1.5 years of relief. He is a medical assistant at [employer]. Patient denies tingling, numbing or burning sensations. He does reports bilateral thigh cramping.     Camacho explains that he has pain in lateral part of bilateral hips with more walking and prolonged sitting  His low back has bothered him for years, and recently his low back and hips have worsened and caused him to need to take more breaks    Location: low back and bilateral hips    Quality:  achy pain    Severity: 5/10 at worst    Duration: see above  Timing: occurs intermittently  Context: occurs while exercising and lifting and using the joint  Modifying factors:  resting and non-use makes it better, movement and use makes it worse  Associated signs & symptoms: radiation into legs    MEDICAL HISTORY  Patient Active Problem List   Diagnosis     Carpal tunnel syndrome     Testicular hypofunction     Fibromyalgia     Sicca syndrome (H)     Medication refill- do not delete      Hepatocellular carcinoma (H)     Osteoarthritis     Rheumatoid arthritis (H)     Hyperkalemia     Liver transplant recipient (H)     Immunosuppressed status (H)     Neutropenic colitis (H)     S/P liver transplant (H)     Pancytopenia (H)     Ischemia of both lower extremities     Elevated serum creatinine      History of creation of ostomy (H)     Peristomal skin complication     Painful periwound skin     Encounter for attention to ileostomy (H)     Ileostomy in place (H)     Abscess, intra-abdominal, postoperative     Liver transplant rejection (H)     CMV colitis (H)     Type 2 diabetes mellitus with diabetic polyneuropathy, with long-term current use of insulin (H)     Diarrhea of infectious origin (Plesiomonas shigelloides (formerly known Aeromonas shigelloides) in 3/14/2019 sample)     Hypertension secondary to other renal disorders     Chronic kidney disease, stage 3 (H)     Morbid obesity (H)       Current Outpatient Medications   Medication Sig Dispense Refill     alcohol swab prep pads Use to swab area of injection/francisca as directed. 100 each 3     alpha-lipoic acid 600 MG capsule Take 600 mg by mouth       amLODIPine (NORVASC) 10 MG tablet Take 1 tablet (10 mg) by mouth daily 90 tablet 3     blood glucose (NO BRAND SPECIFIED) lancets standard Use to test blood sugar 1 times daily or as directed. 100 lancet 3     blood glucose (NO BRAND SPECIFIED) test strip Use to test blood sugar 1 times daily or as directed. 50 strip 11     blood glucose (NO BRAND SPECIFIED) test strip Use to test blood sugar 3 times daily or as directed. Any covered brand preferred by Pt that works with meter. 300 strip 3     blood glucose calibration (NO BRAND SPECIFIED) solution Use to calibrate meter. 1 Bottle 3     blood glucose monitoring (NO BRAND SPECIFIED) meter device kit Use to test blood sugar 3 times daily or as directed. Any covered brand preferred by Pt. 1 kit 1     Blood Glucose Monitoring Suppl (CONTOUR BLOOD GLUCOSE SYSTEM) w/Device KIT For use with the Omnipod Dash insulin pump 1 kit 1     cholecalciferol (VITAMIN D3) 1000 UNIT tablet Take 1 tablet (1,000 Units) by mouth daily (Patient taking differently: Take 1,000 Units by mouth every morning) 100 tablet 3     Continuous Blood Gluc Sensor (DEXCOM G6 SENSOR) MISC  Change every 10 days. 9 each 3     Continuous Blood Gluc Transmit (DEXCOM G6 TRANSMITTER) MISC Change every 3 months. 1 each 3     cyclobenzaprine (FLEXERIL) 10 MG tablet Take 1 tablet (10 mg) by mouth 3 times daily as needed for muscle spasms 90 tablet 3     doxazosin (CARDURA) 2 MG tablet Take 1 tablet (2 mg) by mouth At Bedtime 90 tablet 3     fluticasone (FLONASE) 50 MCG/ACT nasal spray Spray 1 spray into both nostrils daily 20 mL 3     furosemide (LASIX) 40 MG tablet Take 60 mg AM and 40 mg afternoon 180 tablet 3     gabapentin (NEURONTIN) 300 MG capsule Take 2 capsules (600 mg) by mouth 3 times daily 540 capsule 3     Glucose Blood (BLOOD GLUCOSE TEST STRIPS) STRP 1 strip by Lancet route 3 times daily 300 each 3     Insulin Disposable Pump (OMNIPOD DASH 5 PACK PODS) MISC 1 each every 48 hours 45 each 3     Insulin Disposable Pump (OMNIPOD DASH 5 PACK PODS) MISC 1 each every 3 days 6 each 3     Insulin Lispro (HUMALOG KWIKPEN) 200 UNIT/ML soln To be used in the insulin pump. Total daily dose up to 170 U. 72 mL 3     insulin pen needle (BD ENE U/F) 32G X 4 MM miscellaneous Use 1 pen needle 4 times daily or as directed. 400 each 1     lidocaine (LIDODERM) 5 % patch Place 1 patch onto the skin every 24 hours To prevent lidocaine toxicity, patient should be patch free for 12 hrs daily. 30 patch 11     losartan (COZAAR) 25 MG tablet Take 1 tablet (25 mg) by mouth daily 90 tablet 3     metoprolol succinate ER (TOPROL-XL) 200 MG 24 hr tablet Take 1 tablet (200 mg) by mouth daily 90 tablet 3     mycophenolic acid (GENERIC EQUIVALENT) 360 MG EC tablet Take 2 tablets (720 mg) by mouth every 12 hours 360 tablet 3     oxyCODONE (ROXICODONE) 5 MG tablet Take 1 tablet (5 mg) by mouth 4 times daily as needed for pain maximum 6 tablet(s) per day 30 tablet 0     patiromer (VELTASSA) 8.4 g packet Take 8.4 g by mouth daily 90 each 3     predniSONE (DELTASONE) 2.5 MG tablet TAKE 1 TABLET BY MOUTH EVERY DAY 90 tablet 3      sildenafil (REVATIO) 20 MG tablet Take 2 tablets (40 mg) by mouth daily as needed (erectile dysfunction) 10 tablet 11     tacrolimus (GENERIC EQUIVALENT) 1 MG capsule Take 4 capsules (4 mg) by mouth every morning AND 3 capsules (3 mg) every evening. 630 capsule 3     thin (NO BRAND SPECIFIED) lancets Use with lanceting device. Any covered brand. 300 each 3     ursodiol (ACTIGALL) 500 MG tablet TAKE 1 TABLET BY MOUTH TWICE A  tablet 3       Allergies   Allergen Reactions     Erythromycin GI Disturbance     Losartan Other (See Comments)     Consistently develops hyperkalemia     Vioxx      Nausea, vomiting       Family History   Problem Relation Age of Onset     Diabetes Father      Hypertension Father      Substance Abuse Father      Arthritis Mother      Thyroid Cancer Mother         Survivor!     Cervical Cancer Maternal Grandmother      Cerebrovascular Disease Maternal Grandfather      No Known Problems Paternal Grandmother      Prostate Cancer Paternal Grandfather      Colon Cancer No family hx of      Hyperlipidemia No family hx of      Coronary Artery Disease No family hx of      Breast Cancer No family hx of      Social History     Socioeconomic History     Marital status:      Number of children: 1   Occupational History     Occupation:     Tobacco Use     Smoking status: Former Smoker     Packs/day: 0.25     Years: 2.00     Pack years: 0.50     Types: Cigarettes     Quit date:      Years since quittin.7     Smokeless tobacco: Former User     Types: Chew     Quit date: 10/8/2015     Tobacco comment: 1 Cigar once every other month.   Substance and Sexual Activity     Alcohol use: No     Alcohol/week: 0.0 standard drinks     Comment: No alcohol since liver transplant.       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     REVIEW OF SYSTEMS (9/13/2022)  10 point ROS of systems including Constitutional, Eyes, Respiratory, Cardiovascular, Gastroenterology, Genitourinary, Integumentary, Musculoskeletal, Psychiatric, Allergic/Immunologic were all negative except for pertinent positives noted in my HPI.     PHYSICAL EXAM  VSS  Vital Signs: Ht 1.829 m (6')   Wt 135.6 kg (299 lb)   BMI 40.55 kg/m   Patient declined being weighed. Body mass index is 40.55 kg/m .    General  - normal appearance, in no obvious distress  HEENT  - conjunctivae not injected, moist mucous membranes, normocephalic/atraumatic head, ears normal appearance, no lesions, mouth normal appearance, no scars, normal dentition and teeth present  CV  - normal peripheral perfusion  Pulm  - normal respiratory pattern, non-labored  Musculoskeletal - lumbar spine  - stance: normal gait without limp, no obvious leg length discrepancy, normal heel and toe walk  - inspection: normal bone and joint alignment, no obvious scoliosis  - palpation: no paravertebral or bony tenderness  - ROM: flexion exacerbates pain, normal extension, sidebending, rotation  - strength: lower extremities 5/5 in all planes  - special tests:  (+) straight leg raise  (+) slump test  Neuro  - patellar and Achilles DTRs 2+ bilaterally, lower extremity sensory deficit throughout L5 distribution, grossly normal coordination, normal muscle tone  Skin  - no ecchymosis, erythema, warmth, or induration, no obvious rash  Psych  - interactive, appropriate, normal mood and affect  Bilateral hips: no groin pain on ROM exam, has pain with external and internal rotation in Bursa area as well as to palpation over bilateral trochanteric bursa  ASSESSMENT & PLAN  57 yo overweight male with bilateral trochanteric bursitis, lumbar ddd, disc herniations, radicular pain, worse    I independently reviewed the following imaging studies:  Lumbar  xrays: shows ddd  Bilateral hip xrays: shows no significant arthritis bilateral hip joints  After a 20 minute discussion and examination, we decided to perform a same day injection for diagnostic and therapeutic purposes for bilateral hip bursa  Discussed and ordered lumbar MRI  Given HEP  Cont. Flexeril PRN nightly  F/u after lumbar MRI  Consider DANNY and PT    Patient has been doing home exercise physical therapy program for this problem      Appropriate PPE was utilized for prevention of spread of Covid-19.  Ronaldo Doty MD, CAQSM    Trochanteric bilateral Hip Injection - Ultrasound Guided  The patient was informed of the risks and the benefits of the procedure and a written consent was signed.  The patient s bilateral  lateral hip was prepped with chlorhexidine in sterile fashion.   40 mg of triamcinolone suspension was drawn up into a 5 mL syringe with 4 mL of 1% lidocaine.  Injection was performed using sterile technique.  Under ultrasound guidance a 3.5-inch 22-gauge needle was used to enter the patricia-trochanteric tissue.  Needle placement was visualized and documented with ultrasound which was deemed necessary to ensure medication did not enter the tendon itself, which could potentially cause tendon damage.   Injection performed long axis to the probe with probe in short axis to the greater trochanter.  Expansion of the patricia-trochanteric tissue was visualized under ultrasound upon injection.  Images were permanently stored for the patient's record.  There were no complications. The patient tolerated the procedure well. There was negligible bleeding.       Large Joint Injection: bilateral greater trochanteric bursa    Date/Time: 9/13/2022 4:51 PM  Performed by: Ronaldo Doty MD  Authorized by: Ronaldo Doty MD     Indications:  Pain, osteoarthritis and diagnostic evaluation  Needle Size:  22 G  Guidance: ultrasound    Approach:  Lateral  Location:  Hip  Laterality:  Bilateral      Site:   Bilateral greater trochanteric bursa  Medications (Right):  40 mg triamcinolone 40 MG/ML; 3 mL lidocaine (PF) 1 %  Medications (Left):  40 mg triamcinolone 40 MG/ML; 3 mL lidocaine (PF) 1 %  Outcome:  Tolerated well, no immediate complications  Procedure discussed: discussed risks, benefits, and alternatives    Consent Given by:  Patient  Timeout: timeout called immediately prior to procedure    Prep: patient was prepped and draped in usual sterile fashion              Again, thank you for allowing me to participate in the care of your patient.      Sincerely,    Ronaldo Doty MD

## 2022-09-14 ENCOUNTER — TELEPHONE (OUTPATIENT)
Dept: ENDOCRINOLOGY | Facility: CLINIC | Age: 57
End: 2022-09-14

## 2022-09-14 NOTE — TELEPHONE ENCOUNTER
Patient asking if 11/7/22 appointment can be changed to virtual  From in person to not miss work. Char Bass RN on 9/14/2022 at 1:00 PM

## 2022-09-14 NOTE — TELEPHONE ENCOUNTER
[facility] Call Center    Phone Message    May a detailed message be left on voicemail: yes     Reason for Call: Other: .      Per Patient is wanting to know if his appt on 11/7/2022 can be switched to a Telephone visit. Patient states if not patient would need to find a different provider that has later availability to be able not to miss work as much. Patient would like to get a call back to know if this can be done. Please advise    Action Taken: Message routed to:  [facility] ([facility]): Endo    Travel Screening: Not Applicable

## 2022-09-15 NOTE — TELEPHONE ENCOUNTER
The 11/7/22 virtual appointment has been changed to  Virtual from in person per Dr West  Approval. My chart sent to Camacho on this change. Char Bass RN on 9/15/2022 at 9:30 AM

## 2022-09-16 ENCOUNTER — HOSPITAL ENCOUNTER (OUTPATIENT)
Dept: MRI IMAGING | Facility: CLINIC | Age: 57
Discharge: HOME | End: 2022-09-16
Attending: PREVENTIVE MEDICINE | Admitting: PREVENTIVE MEDICINE
Payer: COMMERCIAL

## 2022-09-16 DIAGNOSIS — M25.552 BILATERAL HIP PAIN: ICD-10-CM

## 2022-09-16 DIAGNOSIS — M54.41 CHRONIC BILATERAL LOW BACK PAIN WITH BILATERAL SCIATICA: ICD-10-CM

## 2022-09-16 DIAGNOSIS — M25.551 BILATERAL HIP PAIN: ICD-10-CM

## 2022-09-16 DIAGNOSIS — M51.16 LUMBAR DISC HERNIATION WITH RADICULOPATHY: ICD-10-CM

## 2022-09-16 DIAGNOSIS — G89.29 CHRONIC BILATERAL LOW BACK PAIN WITH BILATERAL SCIATICA: ICD-10-CM

## 2022-09-16 DIAGNOSIS — M51.369 DDD (DEGENERATIVE DISC DISEASE), LUMBAR: ICD-10-CM

## 2022-09-16 DIAGNOSIS — M54.42 CHRONIC BILATERAL LOW BACK PAIN WITH BILATERAL SCIATICA: ICD-10-CM

## 2022-09-16 PROCEDURE — 72148 MRI LUMBAR SPINE W/O DYE: CPT | Mod: 26 | Performed by: RADIOLOGY

## 2022-09-16 PROCEDURE — 72148 MRI LUMBAR SPINE W/O DYE: CPT

## 2022-09-21 DIAGNOSIS — E11.21 TYPE 2 DIABETES MELLITUS WITH DIABETIC NEPHROPATHY, WITH LONG-TERM CURRENT USE OF INSULIN (H): ICD-10-CM

## 2022-09-21 DIAGNOSIS — Z79.4 TYPE 2 DIABETES MELLITUS WITH DIABETIC NEPHROPATHY, WITH LONG-TERM CURRENT USE OF INSULIN (H): ICD-10-CM

## 2022-09-21 RX ORDER — INSULIN PUMP CONTROLLER
1 EACH MISCELLANEOUS
Qty: 6 EACH | Refills: 3 | Status: SHIPPED | OUTPATIENT
Start: 2022-09-21 | End: 2022-09-23

## 2022-09-23 ENCOUNTER — TELEPHONE (OUTPATIENT)
Dept: ENDOCRINOLOGY | Facility: CLINIC | Age: 57
End: 2022-09-23

## 2022-09-23 ENCOUNTER — TELEPHONE (OUTPATIENT)
Dept: EDUCATION SERVICES | Facility: CLINIC | Age: 57
End: 2022-09-23

## 2022-09-23 DIAGNOSIS — E11.21 TYPE 2 DIABETES MELLITUS WITH DIABETIC NEPHROPATHY, WITH LONG-TERM CURRENT USE OF INSULIN (H): ICD-10-CM

## 2022-09-23 DIAGNOSIS — Z79.4 TYPE 2 DIABETES MELLITUS WITH DIABETIC NEPHROPATHY, WITH LONG-TERM CURRENT USE OF INSULIN (H): ICD-10-CM

## 2022-09-23 RX ORDER — INSULIN PUMP CONTROLLER
1 EACH MISCELLANEOUS DAILY
Qty: 45 EACH | Refills: 3 | Status: SHIPPED | OUTPATIENT
Start: 2022-09-23 | End: 2022-11-09

## 2022-09-23 NOTE — TELEPHONE ENCOUNTER
Hello,    Patient reports that he uses one omnipod every 1.5 days. He is requesting a three months supply. Could we please get a new prescription for 60 pods with updated directions?     Thank you,  Abril Ruvalcaba, [facility]  Technician Supervisor  [facility]  [phone]

## 2022-09-23 NOTE — TELEPHONE ENCOUNTER
How can you send an order in for every 1.5 days  It won't let you Char Bass RN on 9/23/2022 at 12:55 PM

## 2022-09-23 NOTE — TELEPHONE ENCOUNTER
[facility] Call Center    Phone Message    May a detailed message be left on voicemail: yes     Reason for Call: Other: patient states that he missed a call from Raely, States the prescription for his last Omnipod, so that prescription should work. Thank you.     Action Taken: Other: Endo    Travel Screening: Not Applicable

## 2022-09-23 NOTE — TELEPHONE ENCOUNTER
The new script was written for 8 each which wont last the patient 16 days.  Can we get a new script for 45 each so pt can get 90 days supply?

## 2022-09-26 RX ORDER — TRIAMCINOLONE ACETONIDE 40 MG/ML
40 INJECTION, SUSPENSION INTRA-ARTICULAR; INTRAMUSCULAR
Status: DISCONTINUED | OUTPATIENT
Start: 2022-09-13 | End: 2023-05-18

## 2022-09-26 RX ORDER — LIDOCAINE HYDROCHLORIDE 10 MG/ML
3 INJECTION, SOLUTION EPIDURAL; INFILTRATION; INTRACAUDAL; PERINEURAL
Status: DISCONTINUED | OUTPATIENT
Start: 2022-09-13 | End: 2023-05-18

## 2022-09-27 ENCOUNTER — LAB (OUTPATIENT)
Dept: LAB | Facility: CLINIC | Age: 57
End: 2022-09-27
Payer: COMMERCIAL

## 2022-09-27 DIAGNOSIS — E11.21 CONTROLLED TYPE 2 DIABETES MELLITUS WITH DIABETIC NEPHROPATHY, UNSPECIFIED WHETHER LONG TERM INSULIN USE (H): ICD-10-CM

## 2022-09-27 DIAGNOSIS — N18.31 STAGE 3A CHRONIC KIDNEY DISEASE (H): ICD-10-CM

## 2022-09-27 DIAGNOSIS — N18.31 ANEMIA OF CHRONIC RENAL FAILURE, STAGE 3A (H): ICD-10-CM

## 2022-09-27 DIAGNOSIS — D63.1 ANEMIA OF CHRONIC RENAL FAILURE, STAGE 3A (H): ICD-10-CM

## 2022-09-27 DIAGNOSIS — E55.9 VITAMIN D DEFICIENCY: ICD-10-CM

## 2022-09-27 DIAGNOSIS — N18.30 STAGE 3 CHRONIC KIDNEY DISEASE, UNSPECIFIED WHETHER STAGE 3A OR 3B CKD (H): ICD-10-CM

## 2022-09-27 DIAGNOSIS — Z13.220 LIPID SCREENING: ICD-10-CM

## 2022-09-27 DIAGNOSIS — Z94.4 LIVER REPLACED BY TRANSPLANT (H): ICD-10-CM

## 2022-09-27 LAB
ALBUMIN SERPL-MCNC: 3.6 G/DL (ref 3.4–5)
ALP SERPL-CCNC: 157 U/L (ref 40–150)
ALT SERPL W P-5'-P-CCNC: 29 U/L (ref 0–70)
ANION GAP SERPL CALCULATED.3IONS-SCNC: 6 MMOL/L (ref 3–14)
AST SERPL W P-5'-P-CCNC: 17 U/L (ref 0–45)
BILIRUB DIRECT SERPL-MCNC: 0.2 MG/DL (ref 0–0.2)
BILIRUB SERPL-MCNC: 0.5 MG/DL (ref 0.2–1.3)
BUN SERPL-MCNC: 52 MG/DL (ref 7–30)
CALCIUM SERPL-MCNC: 8.9 MG/DL (ref 8.5–10.1)
CHLORIDE BLD-SCNC: 111 MMOL/L (ref 94–109)
CO2 SERPL-SCNC: 22 MMOL/L (ref 20–32)
CREAT SERPL-MCNC: 1.59 MG/DL (ref 0.66–1.25)
DEPRECATED CALCIDIOL+CALCIFEROL SERPL-MC: 39 UG/L (ref 20–75)
ERYTHROCYTE [DISTWIDTH] IN BLOOD BY AUTOMATED COUNT: 13.2 % (ref 10–15)
FERRITIN SERPL-MCNC: 624 NG/ML (ref 26–388)
GFR SERPL CREATININE-BSD FRML MDRD: 50 ML/MIN/1.73M2
GLUCOSE BLD-MCNC: 133 MG/DL (ref 70–99)
HBA1C MFR BLD: 6 % (ref 0–5.6)
HCT VFR BLD AUTO: 34 % (ref 40–53)
HGB BLD-MCNC: 11.5 G/DL (ref 13.3–17.7)
IRON SATN MFR SERPL: 44 % (ref 15–46)
IRON SERPL-MCNC: 109 UG/DL (ref 35–180)
MCH RBC QN AUTO: 31.5 PG (ref 26.5–33)
MCHC RBC AUTO-ENTMCNC: 33.8 G/DL (ref 31.5–36.5)
MCV RBC AUTO: 93 FL (ref 78–100)
PHOSPHATE SERPL-MCNC: 3.7 MG/DL (ref 2.5–4.5)
PLATELET # BLD AUTO: 104 10E3/UL (ref 150–450)
POTASSIUM BLD-SCNC: 5.5 MMOL/L (ref 3.4–5.3)
PROT SERPL-MCNC: 6.7 G/DL (ref 6.8–8.8)
PTH-INTACT SERPL-MCNC: 31 PG/ML (ref 15–65)
RBC # BLD AUTO: 3.65 10E6/UL (ref 4.4–5.9)
SODIUM SERPL-SCNC: 139 MMOL/L (ref 133–144)
TACROLIMUS BLD-MCNC: 3.4 UG/L (ref 5–15)
TIBC SERPL-MCNC: 248 UG/DL (ref 240–430)
TME LAST DOSE: ABNORMAL H
TME LAST DOSE: ABNORMAL H
WBC # BLD AUTO: 6.9 10E3/UL (ref 4–11)

## 2022-09-27 PROCEDURE — 80197 ASSAY OF TACROLIMUS: CPT

## 2022-09-27 PROCEDURE — 83550 IRON BINDING TEST: CPT

## 2022-09-27 PROCEDURE — 36415 COLL VENOUS BLD VENIPUNCTURE: CPT

## 2022-09-27 PROCEDURE — 80053 COMPREHEN METABOLIC PANEL: CPT

## 2022-09-27 PROCEDURE — 82248 BILIRUBIN DIRECT: CPT

## 2022-09-27 PROCEDURE — 82728 ASSAY OF FERRITIN: CPT

## 2022-09-27 PROCEDURE — 84100 ASSAY OF PHOSPHORUS: CPT

## 2022-09-27 PROCEDURE — 84460 ALANINE AMINO (ALT) (SGPT): CPT

## 2022-09-27 PROCEDURE — 83970 ASSAY OF PARATHORMONE: CPT

## 2022-09-27 PROCEDURE — 83036 HEMOGLOBIN GLYCOSYLATED A1C: CPT

## 2022-09-27 PROCEDURE — 85027 COMPLETE CBC AUTOMATED: CPT

## 2022-09-27 PROCEDURE — 84075 ASSAY ALKALINE PHOSPHATASE: CPT

## 2022-09-27 PROCEDURE — 84450 TRANSFERASE (AST) (SGOT): CPT

## 2022-09-27 PROCEDURE — 82306 VITAMIN D 25 HYDROXY: CPT

## 2022-10-03 NOTE — TELEPHONE ENCOUNTER
Medical Assistant Note:  Mickie Mcghee presents today for blood draw.    Patient seen by provider today: No.   present during visit today: Not Applicable.    Concerns: No Concerns.    Procedure:  Lab draw site: rac, Needle type: bf, Gauge: 23.    Post Assessment:  Labs drawn without difficulty: Yes.    Discharge Plan:  Departure Mode: Ambulatory.    Face to Face Time: 5 min.    Dorothy Henao, CMA           Per review with , pt to have Liver U/S and liver biopsy for elevation in LFT's.   Message left for Camacho who is at the clinic being seen by primary care.  Per NP in primary care, pt's right abd drain has come out after Camacho accidentally stepped on the tubing.  Plan for pt to cover with dressing, change daily until drainage stops.

## 2022-10-06 DIAGNOSIS — Z79.4 TYPE 2 DIABETES MELLITUS WITH DIABETIC NEPHROPATHY, WITH LONG-TERM CURRENT USE OF INSULIN (H): ICD-10-CM

## 2022-10-06 DIAGNOSIS — E11.21 TYPE 2 DIABETES MELLITUS WITH DIABETIC NEPHROPATHY, WITH LONG-TERM CURRENT USE OF INSULIN (H): ICD-10-CM

## 2022-10-10 RX ORDER — PROCHLORPERAZINE 25 MG/1
SUPPOSITORY RECTAL
Qty: 9 EACH | Refills: 3 | Status: SHIPPED | OUTPATIENT
Start: 2022-10-10 | End: 2023-05-09

## 2022-10-12 ENCOUNTER — MYC MEDICAL ADVICE (OUTPATIENT)
Dept: INTERNAL MEDICINE | Facility: CLINIC | Age: 57
End: 2022-10-12

## 2022-10-12 DIAGNOSIS — M15.0 PRIMARY OSTEOARTHRITIS INVOLVING MULTIPLE JOINTS: ICD-10-CM

## 2022-10-12 RX ORDER — OXYCODONE HYDROCHLORIDE 5 MG/1
5 TABLET ORAL 4 TIMES DAILY PRN
Qty: 30 TABLET | Refills: 0 | Status: SHIPPED | OUTPATIENT
Start: 2022-10-12 | End: 2023-01-13

## 2022-10-13 ENCOUNTER — TELEPHONE (OUTPATIENT)
Dept: GASTROENTEROLOGY | Facility: CLINIC | Age: 57
End: 2022-10-13

## 2022-10-13 DIAGNOSIS — Z12.11 SPECIAL SCREENING FOR MALIGNANT NEOPLASMS, COLON: Primary | ICD-10-CM

## 2022-10-13 RX ORDER — BISACODYL 5 MG
TABLET, DELAYED RELEASE (ENTERIC COATED) ORAL
Qty: 4 TABLET | Refills: 0 | Status: ON HOLD | OUTPATIENT
Start: 2022-10-13 | End: 2022-10-24

## 2022-10-13 NOTE — TELEPHONE ENCOUNTER
Patient scheduled for colonoscopy on 10.24.22.     Covid test scheduled ?. Discuss at home test option.     Arrival time: 0830    Facility location: [facility]    Sedation type: CS    Indication for procedure: screening    Anticoagulations? No    Bowel prep recommendation: Extended d/t BMI 40.55 and Oxycodone use    Extended golytely prep sent to [place], [place] - [address] [place] [address] pharmacy. Prep instructions sent via [facility]    Pre visit planning completed.    Sonia Cardona RN

## 2022-10-14 NOTE — TELEPHONE ENCOUNTER
Attempted to contact patient regarding upcoming colonoscopy procedure on 10.24.22 for pre assessment questions. No answer.     Left message to return call to [phone] #[phone]    Sonia Cardona RN

## 2022-10-17 NOTE — TELEPHONE ENCOUNTER
Pre assessment questions completed for upcoming colonoscopy procedure scheduled on 10.24.22    Discussed at home rapid antigen COVID test 1-2 days prior to procedure.    Reviewed procedural arrival time 0830 and facility location [facility].    Designated  policy reviewed. Instructed to have someone stay 6 hours post procedure.     Anticoagulation/blood thinners? no    Electronic implanted devices? No    Patient has diabetes diagnosis with insulin dependence, also takes Veltassa for elevated potassium levels, states has talked with nephrology provider about upcoming colonoscopy. Advised to obtain guidance from provider who manages diabetes regarding dietary restrictions during bowel prep.    Reviewed extended prep instructions with patient. No fiber/iron supplements or foods that contain nuts/seeds prior to procedure.     Patient verbalized understanding and had no questions or concerns at this time.    Sonia Cardona RN
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
## 2022-10-24 ENCOUNTER — HOSPITAL ENCOUNTER (OUTPATIENT)
Facility: CLINIC | Age: 57
Discharge: HOME | End: 2022-10-24
Attending: STUDENT IN AN ORGANIZED HEALTH CARE EDUCATION/TRAINING PROGRAM | Admitting: STUDENT IN AN ORGANIZED HEALTH CARE EDUCATION/TRAINING PROGRAM
Payer: COMMERCIAL

## 2022-10-24 ENCOUNTER — TELEPHONE (OUTPATIENT)
Dept: ENDOCRINOLOGY | Facility: CLINIC | Age: 57
End: 2022-10-24

## 2022-10-24 VITALS
RESPIRATION RATE: 16 BRPM | SYSTOLIC BLOOD PRESSURE: 138 MMHG | OXYGEN SATURATION: 98 % | HEART RATE: 58 BPM | DIASTOLIC BLOOD PRESSURE: 70 MMHG

## 2022-10-24 LAB
COLONOSCOPY: NORMAL
GLUCOSE BLDC GLUCOMTR-MCNC: 80 MG/DL (ref 70–99)

## 2022-10-24 PROCEDURE — 45380 COLONOSCOPY AND BIOPSY: CPT | Performed by: STUDENT IN AN ORGANIZED HEALTH CARE EDUCATION/TRAINING PROGRAM

## 2022-10-24 PROCEDURE — 82962 GLUCOSE BLOOD TEST: CPT

## 2022-10-24 PROCEDURE — 99153 MOD SED SAME PHYS/QHP EA: CPT | Performed by: STUDENT IN AN ORGANIZED HEALTH CARE EDUCATION/TRAINING PROGRAM

## 2022-10-24 PROCEDURE — G0500 MOD SEDAT ENDO SERVICE >5YRS: HCPCS | Performed by: STUDENT IN AN ORGANIZED HEALTH CARE EDUCATION/TRAINING PROGRAM

## 2022-10-24 PROCEDURE — 88305 TISSUE EXAM BY PATHOLOGIST: CPT | Mod: TC | Performed by: STUDENT IN AN ORGANIZED HEALTH CARE EDUCATION/TRAINING PROGRAM

## 2022-10-24 PROCEDURE — 88305 TISSUE EXAM BY PATHOLOGIST: CPT | Mod: 26 | Performed by: PATHOLOGY

## 2022-10-24 PROCEDURE — 250N000011 HC RX IP 250 OP 636: Performed by: STUDENT IN AN ORGANIZED HEALTH CARE EDUCATION/TRAINING PROGRAM

## 2022-10-24 RX ORDER — FENTANYL CITRATE 50 UG/ML
INJECTION, SOLUTION INTRAMUSCULAR; INTRAVENOUS PRN
Status: DISCONTINUED | OUTPATIENT
Start: 2022-10-24 | End: 2022-10-24 | Stop reason: HOSPADM

## 2022-10-24 RX ORDER — PROCHLORPERAZINE MALEATE 5 MG
10 TABLET ORAL EVERY 6 HOURS PRN
Status: CANCELLED | OUTPATIENT
Start: 2022-10-24

## 2022-10-24 RX ORDER — ONDANSETRON 4 MG/1
4 TABLET, ORALLY DISINTEGRATING ORAL EVERY 6 HOURS PRN
Status: CANCELLED | OUTPATIENT
Start: 2022-10-24

## 2022-10-24 RX ORDER — NALOXONE HYDROCHLORIDE 0.4 MG/ML
0.2 INJECTION, SOLUTION INTRAMUSCULAR; INTRAVENOUS; SUBCUTANEOUS
Status: CANCELLED | OUTPATIENT
Start: 2022-10-24

## 2022-10-24 RX ORDER — LIDOCAINE 40 MG/G
CREAM TOPICAL
Status: DISCONTINUED | OUTPATIENT
Start: 2022-10-24 | End: 2022-10-24 | Stop reason: HOSPADM

## 2022-10-24 RX ORDER — ONDANSETRON 2 MG/ML
4 INJECTION INTRAMUSCULAR; INTRAVENOUS EVERY 6 HOURS PRN
Status: CANCELLED | OUTPATIENT
Start: 2022-10-24

## 2022-10-24 RX ORDER — SODIUM CHLORIDE, SODIUM LACTATE, POTASSIUM CHLORIDE, CALCIUM CHLORIDE 600; 310; 30; 20 MG/100ML; MG/100ML; MG/100ML; MG/100ML
INJECTION, SOLUTION INTRAVENOUS CONTINUOUS
Status: DISCONTINUED | OUTPATIENT
Start: 2022-10-24 | End: 2022-10-24 | Stop reason: HOSPADM

## 2022-10-24 RX ORDER — NALOXONE HYDROCHLORIDE 0.4 MG/ML
0.4 INJECTION, SOLUTION INTRAMUSCULAR; INTRAVENOUS; SUBCUTANEOUS
Status: CANCELLED | OUTPATIENT
Start: 2022-10-24

## 2022-10-24 RX ORDER — ONDANSETRON 2 MG/ML
4 INJECTION INTRAMUSCULAR; INTRAVENOUS
Status: DISCONTINUED | OUTPATIENT
Start: 2022-10-24 | End: 2022-10-24 | Stop reason: HOSPADM

## 2022-10-24 RX ORDER — FLUMAZENIL 0.1 MG/ML
0.2 INJECTION, SOLUTION INTRAVENOUS
Status: CANCELLED | OUTPATIENT
Start: 2022-10-24 | End: 2022-10-24

## 2022-10-24 ASSESSMENT — ACTIVITIES OF DAILY LIVING (ADL): ADLS_ACUITY_SCORE: 37

## 2022-10-24 NOTE — TELEPHONE ENCOUNTER
[facility] Call Center    Phone Message    May a detailed message be left on voicemail: no    Reason for Call: Other: Ira calling from [employer] wondering if a verbal order can be done for pt. Per Ira will also send over fax. Please follow up. Thank you     Action Taken: Message routed to:  Other: endo    Travel Screening: Not Applicable

## 2022-10-24 NOTE — LETTER
October 27, 2022      Camacho Bhagat  [address] [facility] [place] [address]        Dear ,    We are writing to inform you of your test results.    The polyps removed during your colonoscopy returned as benign, hyperplastic polyps. These are not precancerous polyps and there was NO cancer in the polyps.     Given the finding of this type of polyp, I recommend that you undergo a repeat colonoscopy in ten years. However, a sooner examination might be necessary if you start developing any symptoms such as rectal bleeding, change in bowel habits, anemia, etc.  Please discuss this with your primary physician at the time of your next office appointment.  Your primary physician will schedule this repeat colonoscopy at the appropriate time.    I took biopsies of your colon to evaluate your diarrhea. The biopsies came back as normal. Your diarrhea is likely from your colon resection surgery as we discussed.     It has been a pleasure to participate in your care.  Please call our clinic if you have any questions or concerns.          Resulted Orders   Surgical Pathology Exam   Result Value Ref Range    Case Report       Surgical Pathology Report                         Case: [id number]                                  Authorizing Provider:  Abril Mcneill MD          Collected:           10/24/2022 09:35 AM          Ordering Location:     [facility]          Received:            10/24/2022 10:06 AM                                 Endoscopy                                                                    Pathologist:           Augustina Roldan MD                                                           Specimens:   A) - Large Intestine, Colon, Random, Colon biopsy                                                   B) - Large Intestine, Colon, Descending, Colon polyp                                                C) - Rectum, Rectal polyp                                                                  Final  "Diagnosis       A. COLON, RANDOM BIOPSY:  Colonic mucosa with no evidence of microscopic or chronic colitis or other significant histologic abnormality     B. DESCENDING COLON, POLYP, BIOPSY:  Hyperplastic polyp     C. RECTUM, POLYP, BIOPSY:  Hyperplastic polyp         Clinical Information       Liver transplant recipient       Gross Description       A(1). Large Intestine, Colon, Random, Colon biopsy:  The specimen is received in formalin with proper patient identification, labeled \"colon biopsy\".  The specimen consists of 7 irregular tan pieces of soft tissue ranging 0.2-0.4 cm in greatest dimension which are entirely submitted in cassette A1.  B(2). Large Intestine, Colon, Descending, Colon polyp:  The specimen is received in formalin with proper patient identification, labeled \"colon polyp\".  The specimen consists of a 0.3 cm in greatest dimension, irregular tan soft tissue which is entirely submitted in cassette B1.  C(3). Rectum, Rectal polyp:  The specimen is received in formalin with proper patient identification, labeled \"rectal polyp\".  The specimen consists of a 0.3 cm in greatest dimension, irregular tan soft tissue which is entirely submitted in cassette C1.      Microscopic Description       Microscopic examination has been performed.      A resident/fellow in an ACGME accredited training program was involved in the initial review, preparation, and/or interpretation of this case.  I, as the senior physician, attest that I: (i) reviewed patient clinical records if indicated; (ii) reviewed relevant lab test results; (iii) examined the relevant preparation(s) for the specimen(s); and (iv) agree with the report, diagnosis(es), and interpretation as documented by the resident/fellow and edited/confirmed by me. Reporting resident/fellow: .Bam Kaplan PGY6      Performing Labs       The technical component of this testing was completed at [facility] " Laboratory      Case Images         If you have any questions or concerns, please call the clinic at the number listed above.       Sincerely,      Abril Mcneill MD

## 2022-10-24 NOTE — H&P
Camacho MOORE Olayinka  [id number]  male  57 year old      Reason for procedure/surgery: screening colonoscopy, history of hemicolectomy for CMV colitis    Patient Active Problem List   Diagnosis     Carpal tunnel syndrome     Testicular hypofunction     Fibromyalgia     Sicca syndrome (H)     Medication refill- do not delete      Hepatocellular carcinoma (H)     Osteoarthritis     Rheumatoid arthritis (H)     Hyperkalemia     Liver transplant recipient (H)     Immunosuppressed status (H)     Neutropenic colitis (H)     S/P liver transplant (H)     Pancytopenia (H)     Ischemia of both lower extremities     Elevated serum creatinine     History of creation of ostomy (H)     Peristomal skin complication     Painful periwound skin     Encounter for attention to ileostomy (H)     Ileostomy in place (H)     Abscess, intra-abdominal, postoperative     Liver transplant rejection (H)     CMV colitis (H)     Type 2 diabetes mellitus with diabetic polyneuropathy, with long-term current use of insulin (H)     Diarrhea of infectious origin (Plesiomonas shigelloides (formerly known Aeromonas shigelloides) in 3/14/2019 sample)     Hypertension secondary to other renal disorders     Chronic kidney disease, stage 3 (H)     Morbid obesity (H)       Past Surgical History:    Past Surgical History:   Procedure Laterality Date     BENCH LIVER N/A 3/4/2017    Procedure: BENCH LIVER;  Surgeon: Jovan Tran MD;  Location: [facility]     CHOLECYSTECTOMY       COLONOSCOPY N/A 7/21/2017    Procedure: COMBINED COLONOSCOPY, SINGLE OR MULTIPLE BIOPSY/POLYPECTOMY BY BIOPSY;  Colonoscopy;  Surgeon: Izaiah Montes MD;  [facility]     ENDOSCOPIC RETROGRADE CHOLANGIOPANCREATOGRAM N/A 5/22/2018    Procedure: COMBINED ENDOSCOPIC RETROGRADE CHOLANGIOPANCREATOGRAPHY, PLACE TUBE/STENT;  Endoscopic Retrograde Cholangiopancreatography with sphincterotomy and pancreatic duct stent placement;  Surgeon: Zay Gaitan MD;  [facility]      ENT SURGERY      Repair of deviated septum     ESOPHAGOSCOPY, GASTROSCOPY, DUODENOSCOPY (EGD), COMBINED N/A 8/4/2016    Procedure: COMBINED ESOPHAGOSCOPY, GASTROSCOPY, DUODENOSCOPY (EGD), BIOPSY SINGLE OR MULTIPLE;  Surgeon: Trent Pederson MD;  Location: [facility]     ESOPHAGOSCOPY, GASTROSCOPY, DUODENOSCOPY (EGD), COMBINED N/A 8/27/2017    Procedure: COMBINED ESOPHAGOSCOPY, GASTROSCOPY, DUODENOSCOPY (EGD);;  Surgeon: Los Wynn MD;  Location: [facility]     INCISION AND DRAINAGE ABDOMEN WASHOUT, COMBINED Right 2/14/2018    Procedure: COMBINED INCISION AND DRAINAGE ABDOMEN WASHOUT;  COMBINED INCISION AND DRAINAGE right ABDOMEN flank;  Surgeon: Sara Dinh MD;  Location: [facility]     LAPAROTOMY EXPLORATORY N/A 7/4/2017    Procedure: LAPAROTOMY EXPLORATORY;  Exploratory Laparotomy, washout;  Surgeon: Tip Zhang MD;  [facility]     LAPAROTOMY EXPLORATORY N/A 7/5/2017    Procedure: LAPAROTOMY EXPLORATORY;  Exploratory Laparotomy, Washout with closure.;  Surgeon: Tip Zhang MD;  [facility]     LAPAROTOMY EXPLORATORY N/A 7/26/2017    Procedure: LAPAROTOMY EXPLORATORY;  Exploratory Laparotomy, Right angelique-colectomy, end ileostomy, mucosal fistula, partial omentectomy;  Surgeon: Sara Dinh MD;  Location: [facility]     ORTHOPEDIC SURGERY Right     Repair of dislocated wrist.       RELEASE TRIGGER FINGER Right 11/10/2017    Procedure: RELEASE TRIGGER FINGER;  Debride, V-Y Flap Right Index Finger;  Surgeon: Momo Noonan MD;  [facility]     TAKEDOWN ILEOSTOMY N/A 1/5/2018    Procedure: TAKEDOWN ILEOSTOMY;  Exploratory Laparotomy, Lysis of Adhesions, Takedown Of End Ileostomy, Takedown of mucocutaneous fistula, ileocolic resection  And Colorectal Anastomosis, Primary repair of Ventral Hernia, Anesthesia Block ;  Surgeon: Sara Dinh MD;  Location: [facility]     TRACHEOSTOMY  2001    During hospitalization for pneumonia.       TRANSPLANT LIVER  RECIPIENT  DONOR N/A 3/4/2017    Procedure: TRANSPLANT LIVER RECIPIENT  DONOR;  Surgeon: Jovan Tran MD;  Location:  OR     [facility] TOTAL KNEE ARTHROPLASTY      Right knee arthroscopy       Past Medical History:   Past Medical History:   Diagnosis Date     Depressive disorder, not elsewhere classified      Diabetes type 2, controlled (H) 11/10/2016     Esophageal reflux      Fibromyalgia 2009    dx with Dr Benitez( Rhe[facility])     Gangrene of finger (H) 2017     H/O deep venous thrombosis 2001    Permanent IVC filter in place.     H/O CASTAÑEDA (nonalcoholic steatohepatitis)      H/O Pneumonia, organism unspecified(486) 10/2001    Included ARDS, sepsis, and  acute renal failure; hospitalized, required tracheostomy placement.     H/O: HTN (hypertension) 2001    No longer prescribed antihypertensive medication.       History of CVA (cerebrovascular accident)      History of hepatocellular carcinoma      History of liver transplant (H)      History of obstructive sleep apnea     No longer wears CPAP since losing approximately 200 pounds with his liver transplant and its complications.       HLD (hyperlipidemia)      Ischemia of both lower extremities 2017    Distal ischemia due to shock/high pressor requirements     Liver transplant rejection (H) 2018     Neutropenic colitis (H) 2017     Osteoarthritis      Presence of PERMANENT IVC filter      Rheumatoid arthritis(714.0)        Social History:   Social History     Tobacco Use     Smoking status: Former     Packs/day: 0.25     Years: 2.00     Pack years: 0.50     Types: Cigarettes     Quit date:      Years since quittin.8     Smokeless tobacco: Former     Types: Chew     Quit date: 10/8/2015     Tobacco comments:     1 Cigar once every other month.   Substance Use Topics     Alcohol use: No     Alcohol/week: 0.0 standard drinks     Comment: No alcohol since liver transplant.         Family History:   Family History    Problem Relation Age of Onset     Diabetes Father      Hypertension Father      Substance Abuse Father      Arthritis Mother      Thyroid Cancer Mother         Survivor!     Cervical Cancer Maternal Grandmother      Cerebrovascular Disease Maternal Grandfather      No Known Problems Paternal Grandmother      Prostate Cancer Paternal Grandfather      Colon Cancer No family hx of      Hyperlipidemia No family hx of      Coronary Artery Disease No family hx of      Breast Cancer No family hx of        Allergies:   Allergies   Allergen Reactions     Erythromycin GI Disturbance     Losartan Other (See Comments)     Consistently develops hyperkalemia     Vioxx      Nausea, vomiting       Active Medications:   No current outpatient medications on file.       Systemic Review:   CONSTITUTIONAL: NEGATIVE for fever, chills, change in weight  ENT/MOUTH: NEGATIVE for ear, mouth and throat problems  RESP: NEGATIVE for significant cough or SOB  CV: NEGATIVE for chest pain, palpitations or peripheral edema    Physical Examination:   Vital Signs: There were no vitals taken for this visit.  GENERAL: healthy, alert and no distress  NECK: no adenopathy, no asymmetry, masses, or scars  RESP: lungs clear to auscultation - no rales, rhonchi or wheezes  CV: regular rate and rhythm, normal S1 S2, no S3 or S4, no murmur, click or rub, no peripheral edema and peripheral pulses strong  ABDOMEN: soft, nontender, no hepatosplenomegaly, no masses and bowel sounds normal  MS: no gross musculoskeletal defects noted, no edema      Plan: Appropriate to proceed as scheduled.      Abril Mcneill MD  10/24/2022    PCP:  Shay Kirkpatrick

## 2022-10-24 NOTE — OR NURSING
Colonoscopy with biopsies and polypectomy X 2 performed under moderate sedation, patient tolerated well. Transferred to  and report given to recovery RN.

## 2022-10-25 LAB
PATH REPORT.COMMENTS IMP SPEC: NORMAL
PATH REPORT.COMMENTS IMP SPEC: NORMAL
PATH REPORT.FINAL DX SPEC: NORMAL
PATH REPORT.GROSS SPEC: NORMAL
PATH REPORT.MICROSCOPIC SPEC OTHER STN: NORMAL
PATH REPORT.RELEVANT HX SPEC: NORMAL
PHOTO IMAGE: NORMAL

## 2022-10-26 NOTE — TELEPHONE ENCOUNTER
Camacho is using u200 in his Omnipod Dash.  Automated mode in OP5 is not approved for use with U200 insulin.  Left message for Camacho to discuss this. Arely Williamson RN,CDE

## 2022-10-31 NOTE — PROGRESS NOTES
Outcome for 10/31/22 2:18 PM: Data uploaded on Dexcom  Outcome for 10/31/22 2:18 PM: iOmandot message sent - to upload omnipod on glooko.  Janice Schaefer, SALVATORE  Outcome for 11/03/22 7:50 AM: Patient replied to [employer] he is continuously sharing on glooko. Data uploaded on glooko.   SALVATORE Weber  Outcome for 11/03/22 10:04 AM: Dexcom and Glooko emailed to provider  Reshma Millan, SALVATORE

## 2022-11-01 ENCOUNTER — TELEPHONE (OUTPATIENT)
Dept: ENDOCRINOLOGY | Facility: CLINIC | Age: 57
End: 2022-11-01

## 2022-11-01 DIAGNOSIS — I10 HTN (HYPERTENSION): ICD-10-CM

## 2022-11-01 RX ORDER — AMLODIPINE BESYLATE 10 MG/1
10 TABLET ORAL DAILY
Qty: 90 TABLET | Refills: 3 | Status: SHIPPED | OUTPATIENT
Start: 2022-11-01 | End: 2023-05-05

## 2022-11-01 NOTE — TELEPHONE ENCOUNTER
[facility] Call Center    Phone Message    May a detailed message be left on voicemail: yes     Reason for Call: Other: .      Per Patient is wanting to get a call back. Patient states he received a message about uploading his Omni Pod. Patient states he thought it was set up to automatically upload to the clinic and wanting to have the nurse verify before calling back. Please advise.     Action Taken: Message routed to:  [facility] ([facility] Endo    Travel Screening: Not Applicable

## 2022-11-03 NOTE — TELEPHONE ENCOUNTER
Sent patient mychart message advising we have glooko data and nothing more is needed. Reshma Millan on 11/3/2022 at 7:53 AM

## 2022-11-07 ENCOUNTER — TELEPHONE (OUTPATIENT)
Dept: ENDOCRINOLOGY | Facility: CLINIC | Age: 57
End: 2022-11-07

## 2022-11-07 ENCOUNTER — VIRTUAL VISIT (OUTPATIENT)
Dept: ENDOCRINOLOGY | Facility: CLINIC | Age: 57
End: 2022-11-07
Payer: COMMERCIAL

## 2022-11-07 DIAGNOSIS — E11.21 TYPE 2 DIABETES MELLITUS WITH DIABETIC NEPHROPATHY, WITH LONG-TERM CURRENT USE OF INSULIN (H): Primary | ICD-10-CM

## 2022-11-07 DIAGNOSIS — G62.9 PERIPHERAL POLYNEUROPATHY: ICD-10-CM

## 2022-11-07 DIAGNOSIS — Z79.4 TYPE 2 DIABETES MELLITUS WITH DIABETIC NEPHROPATHY, WITH LONG-TERM CURRENT USE OF INSULIN (H): Primary | ICD-10-CM

## 2022-11-07 PROCEDURE — 99213 OFFICE O/P EST LOW 20 MIN: CPT | Mod: 95 | Performed by: INTERNAL MEDICINE

## 2022-11-07 ASSESSMENT — ENCOUNTER SYMPTOMS
CONSTIPATION: 0
MUSCLE WEAKNESS: 0
NECK PAIN: 1
STIFFNESS: 1
BLOATING: 1
DIARRHEA: 1
BOWEL INCONTINENCE: 0
ARTHRALGIAS: 1
BLOOD IN STOOL: 0
NAUSEA: 0
JAUNDICE: 0
JOINT SWELLING: 1
ABDOMINAL PAIN: 0
RECTAL PAIN: 0
VOMITING: 0
MUSCLE CRAMPS: 1
HEARTBURN: 0
BACK PAIN: 1
MYALGIAS: 1

## 2022-11-07 NOTE — PROGRESS NOTES
Camacho is a 57 year old who is being evaluated via a billable telephone visit.      What phone number would you like to be contacted at? [phone]  How would you like to obtain your AVS? Kwaku    Due to the COVID 19 pandemic this visit was converted to a telephone visit in order to help prevent spread of infection in this patient and the general population.     Phone call start time: 8:44 am   Phone call end time: 9:07 am    Camacho Bhagat is a 57 year old male with PMHx of CASTAÑEDA/HCC s/p liver transplant (3/2017), fibromyalgia, CKD, DVT, HTN, RONNIE, OA, who presents for follow-up of type 2 DM, diagnosed in 2002.  Oral meds first, later placed on insulin.     Related history: h/o CASTAÑEDA cirrhosis and HCC s/p liver transplant 3/2017. Course complicated by acute abdomen/neutropenic fever requiring right hemicolectomy and colostomy Fall 2017. He later underwent colostomy takedown 1/5/18 which was complicated by abdominal wall abscess at a drain site. During this course, he has also developed excessive proteinuria.    Hemoglobin A1c was 6% on 9/27/22.  I reviewed both insulin pump and sensor records.    Insulin pump (Omnipod Dash - 8/2021) settings:  Basal rate   12 AM 1.45 units/h  3 AM 1.4 units/h  3 PM 1.35 units/h   6 PM 1.4 units/h   9 PM 1.45 units/h  Insulin to carbohydrate ratio 1 unit per 7.5 g at 12 AM and 7 g at 12 PM  Sensitivity 25   Blood glucose correction threshold 125  Glucose target range 100  Minimum BG for bolus calculation 70  He uses Humalog 200 pens to fill the pump reservoir.     Unfortunately, the insulin pump records from [facility] do not document the insulin use and administration.  According to the patient, the total daily insulin dose for yesterday was 85 units, of which 33.75 U are basal insulin.  The average glucose on the sensor is 195, with a standard deviation of 75 and CV of 38%.  45% of the glucose numbers are within target of , 23% are above 250 and 1% are in the mild hypoglycemic  range.  The glucose trend reveals a postprandial spike around 3 pm and an elevated bedtime BG , which translates into a higher BG during the night.  The sensor glucose reaches a tiffany around 9-10 am.     Breakfast is generally around 4:15 in the morning.  Lunch is at noon and dinner is around 6:30 PM.  He denies having bedtime snacks.  He feels he requires more insulin for bolus, especially for dinner and lunch.  In the past, he tried Victoza and he was not able to tolerate it due to projectile vomiting.    Related meds:  Prednisone started for transplant and the dose remains 2.5 mg per day.     Complications  + chronic complications.      1. Retinopathy: No known eye disease  Last eye exam was in 8/2022.  HDR per patient. He has cataracts.       2.  Nephropathy: yes. CKD IIIa . He has significant proteinuria and f/up with nephrology.  Recent GFR in the 50s. He was started on losartan on 4/4/18 and the dose was increased to 50 mg daily. Discontinued losartan around 3/2019 due to hyperkalemia.     3. Neuropathy.  Gabapentin was prescribed to control the neuropathic pain in the left foot, which started over 10 years ago.  He takes 600 mg TID. No ulcers or infection. No amputation.  He now has bilateral foot pain.     4. Lipids: not on statin.   Last LDL 78, , HDL cholesterol 38 in 12/2021.     In general, he is able to recognize the hypoglycemic episodes: He feels flushed, warm and dizzy.  Denies experiencing tremor or palpitations.  The lowest blood glucose he remembers experiencing was in the 50s.  He does have glucagon injection at home.   Other pertinent data:   Vitamin D 39, PTH 31 on 9/27/22     LS XRay from 9/13/22 revealed an L1 vertebral compression fracture.  The fracture was not mentioned on the follow-up MRI.   The patient has a longstanding history of back pain, which started in 1993.  The lumbar spine x-ray was recommended as he complained of hip pain, for which he has been receiving  intra-articular steroid injections.    DXA 2/2020  The most negative and valid T-score of -1.8 at the level of the right femoral neck. Compared with baseline 2019 exam there was a significant decrease of BMD at the level of the lumbar spine, left total hip and right total hip.  (-2.6%; -17.4%; -18.2%).     Past Medical History  Past Medical History:   Diagnosis Date     Depressive disorder, not elsewhere classified      Diabetes type 2, controlled (H) 11/10/2016     Esophageal reflux      Fibromyalgia 1/2009    dx with Dr Benitez( Rheum)     Gangrene of finger (H) 8/25/2017     H/O deep venous thrombosis 11/2001    Permanent IVC filter in place.     H/O CASTAÑEDA (nonalcoholic steatohepatitis)      H/O Pneumonia, organism unspecified(486) 10/2001    Included ARDS, sepsis, and  acute renal failure; hospitalized, required tracheostomy placement.     H/O: HTN (hypertension) 11/2001    No longer prescribed antihypertensive medication.       History of CVA (cerebrovascular accident)      History of hepatocellular carcinoma      History of liver transplant (H)      History of obstructive sleep apnea     No longer wears CPAP since losing approximately 200 pounds with his liver transplant and its complications.       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     Osteoarthritis      Presence of PERMANENT IVC filter      Rheumatoid arthritis(714.0)    Osteopenia 2020    Past Surgical History  Past Surgical History:   Procedure Laterality Date     BENCH LIVER N/A 3/4/2017    Procedure: BENCH LIVER;  Surgeon: Jovan Tran MD;  Location: [facility]     CHOLECYSTECTOMY       COLONOSCOPY N/A 7/21/2017    Procedure: COMBINED COLONOSCOPY, SINGLE OR MULTIPLE BIOPSY/POLYPECTOMY BY BIOPSY;  Colonoscopy;  Surgeon: Izaiah Montes MD;  Location: [facility]     COLONOSCOPY  N/A 10/24/2022    Procedure: COLONOSCOPY, WITH POLYPECTOMY AND BIOPSY;  Surgeon: Abril Mcneill MD;  Location: [facility]     ENDOSCOPIC RETROGRADE CHOLANGIOPANCREATOGRAM N/A 5/22/2018    Procedure: COMBINED ENDOSCOPIC RETROGRADE CHOLANGIOPANCREATOGRAPHY, PLACE TUBE/STENT;  Endoscopic Retrograde Cholangiopancreatography with sphincterotomy and pancreatic duct stent placement;  Surgeon: Zay Gaitan MD;  Location: [facility]     ENT SURGERY      Repair of deviated septum     ESOPHAGOSCOPY, GASTROSCOPY, DUODENOSCOPY (EGD), COMBINED N/A 8/4/2016    Procedure: COMBINED ESOPHAGOSCOPY, GASTROSCOPY, DUODENOSCOPY (EGD), BIOPSY SINGLE OR MULTIPLE;  Surgeon: Trent Pederson MD;  Location:  GI     ESOPHAGOSCOPY, GASTROSCOPY, DUODENOSCOPY (EGD), COMBINED N/A 8/27/2017    Procedure: COMBINED ESOPHAGOSCOPY, GASTROSCOPY, DUODENOSCOPY (EGD);;  Surgeon: Los Wynn MD;  Location: [facility]     INCISION AND DRAINAGE ABDOMEN WASHOUT, COMBINED Right 2/14/2018    Procedure: COMBINED INCISION AND DRAINAGE ABDOMEN WASHOUT;  COMBINED INCISION AND DRAINAGE right ABDOMEN flank;  Surgeon: Sara Dinh MD;  [facility]: [facility]     LAPAROTOMY EXPLORATORY N/A 7/4/2017    Procedure: LAPAROTOMY EXPLORATORY;  Exploratory Laparotomy, washout;  Surgeon: Tip Zhang MD;  [facility]     LAPAROTOMY EXPLORATORY N/A 7/5/2017    Procedure: LAPAROTOMY EXPLORATORY;  Exploratory Laparotomy, Washout with closure.;  Surgeon: Tip Zhang MD;  [facility]     LAPAROTOMY EXPLORATORY N/A 7/26/2017    Procedure: LAPAROTOMY EXPLORATORY;  Exploratory Laparotomy, Right angelique-colectomy, end ileostomy, mucosal fistula, partial omentectomy;  Surgeon: Sara Dinh MD;  Location: [facility]     ORTHOPEDIC SURGERY Right     Repair of dislocated wrist.       RELEASE TRIGGER FINGER Right 11/10/2017    Procedure: RELEASE TRIGGER FINGER;  Debride, V-Y Flap Right Index Finger;  Surgeon: Momo Noonan MD;   [facility]     TAKEDOWN ILEOSTOMY N/A 2018    Procedure: TAKEDOWN ILEOSTOMY;  Exploratory Laparotomy, Lysis of Adhesions, Takedown Of End Ileostomy, Takedown of mucocutaneous fistula, ileocolic resection  And Colorectal Anastomosis, Primary repair of Ventral Hernia, Anesthesia Block ;  Surgeon: Sara Dinh MD;  [facility]: [facility]     TRACHEOSTOMY      During hospitalization for pneumonia.       TRANSPLANT LIVER RECIPIENT  DONOR N/A 3/4/2017    Procedure: TRANSPLANT LIVER RECIPIENT  DONOR;  Surgeon: Jovan Tran MD;  Location: [facility]     [facility] TOTAL KNEE ARTHROPLASTY      Right knee arthroscopy        Medications    Current Outpatient Medications:      alcohol swab prep pads, Use to swab area of injection/francisca as directed., Disp: 100 each, Rfl: 3     alpha-lipoic acid 600 MG capsule, Take 600 mg by mouth, Disp: , Rfl:      amLODIPine (NORVASC) 10 MG tablet, Take 1 tablet (10 mg) by mouth daily, Disp: 90 tablet, Rfl: 3     blood glucose (NO BRAND SPECIFIED) lancets standard, Use to test blood sugar 1 times daily or as directed., Disp: 100 lancet, Rfl: 3     blood glucose (NO BRAND SPECIFIED) test strip, Use to test blood sugar 1 times daily or as directed., Disp: 50 strip, Rfl: 11     blood glucose (NO BRAND SPECIFIED) test strip, Use to test blood sugar 3 times daily or as directed. Any covered brand preferred by Pt that works with meter., Disp: 300 strip, Rfl: 3     blood glucose calibration (NO BRAND SPECIFIED) solution, Use to calibrate meter., Disp: 1 Bottle, Rfl: 3     blood glucose monitoring (NO BRAND SPECIFIED) meter device kit, Use to test blood sugar 3 times daily or as directed. Any covered brand preferred by Pt., Disp: 1 kit, Rfl: 1     Blood Glucose Monitoring Suppl (CONTOUR BLOOD GLUCOSE SYSTEM) w/Device KIT, For use with the Omnipod Dash insulin pump, Disp: 1 kit, Rfl: 1     cholecalciferol (VITAMIN D3) 1000 UNIT tablet, Take 1 tablet (1,000 Units) by  mouth daily (Patient taking differently: Take 1,000 Units by mouth every morning), Disp: 100 tablet, Rfl: 3     Continuous Blood Gluc Sensor (DEXCOM G6 SENSOR) MISC, Change every 10 days., Disp: 9 each, Rfl: 3     Continuous Blood Gluc Transmit (DEXCOM G6 TRANSMITTER) MISC, Change every 3 months., Disp: 1 each, Rfl: 3     cyclobenzaprine (FLEXERIL) 10 MG tablet, Take 1 tablet (10 mg) by mouth 3 times daily as needed for muscle spasms, Disp: 90 tablet, Rfl: 3     doxazosin (CARDURA) 2 MG tablet, Take 1 tablet (2 mg) by mouth At Bedtime, Disp: 90 tablet, Rfl: 3     fluticasone (FLONASE) 50 MCG/ACT nasal spray, Spray 1 spray into both nostrils daily, Disp: 20 mL, Rfl: 3     furosemide (LASIX) 40 MG tablet, Take 60 mg AM and 40 mg afternoon, Disp: 180 tablet, Rfl: 3     gabapentin (NEURONTIN) 300 MG capsule, Take 2 capsules (600 mg) by mouth 3 times daily, Disp: 540 capsule, Rfl: 3     Glucose Blood (BLOOD GLUCOSE TEST STRIPS) STRP, 1 strip by Lancet route 3 times daily, Disp: 300 each, Rfl: 3     Insulin Disposable Pump (OMNIPOD DASH 5 PACK PODS) MISC, 1 each every 48 hours, Disp: 45 each, Rfl: 3     Insulin Disposable Pump (OMNIPOD DASH PODS, GEN 4,) MISC, 1 each daily Change every 1-2 day, Disp: 45 each, Rfl: 3     Insulin Lispro (HUMALOG KWIKPEN) 200 UNIT/ML soln, To be used in the insulin pump. Total daily dose up to 170 U., Disp: 72 mL, Rfl: 3     insulin pen needle (BD ENE U/F) 32G X 4 MM miscellaneous, Use 1 pen needle 4 times daily or as directed., Disp: 400 each, Rfl: 1     lidocaine (LIDODERM) 5 % patch, Place 1 patch onto the skin every 24 hours To prevent lidocaine toxicity, patient should be patch free for 12 hrs daily., Disp: 30 patch, Rfl: 11     losartan (COZAAR) 25 MG tablet, Take 1 tablet (25 mg) by mouth daily, Disp: 90 tablet, Rfl: 3     metoprolol succinate ER (TOPROL-XL) 200 MG 24 hr tablet, Take 1 tablet (200 mg) by mouth daily, Disp: 90 tablet, Rfl: 3     mycophenolic acid (GENERIC EQUIVALENT)  360 MG EC tablet, Take 2 tablets (720 mg) by mouth every 12 hours, Disp: 360 tablet, Rfl: 3     oxyCODONE (ROXICODONE) 5 MG tablet, Take 1 tablet (5 mg) by mouth 4 times daily as needed for pain maximum 6 tablet(s) per day, Disp: 30 tablet, Rfl: 0     patiromer (VELTASSA) 8.4 g packet, Take 8.4 g by mouth daily, Disp: 90 each, Rfl: 3     predniSONE (DELTASONE) 2.5 MG tablet, TAKE 1 TABLET BY MOUTH EVERY DAY, Disp: 90 tablet, Rfl: 3     sildenafil (REVATIO) 20 MG tablet, Take 2 tablets (40 mg) by mouth daily as needed (erectile dysfunction), Disp: 10 tablet, Rfl: 11     tacrolimus (GENERIC EQUIVALENT) 1 MG capsule, Take 4 capsules (4 mg) by mouth every morning AND 3 capsules (3 mg) every evening., Disp: 630 capsule, Rfl: 3     thin (NO BRAND SPECIFIED) lancets, Use with lanceting device. Any covered brand., Disp: 300 each, Rfl: 3     ursodiol (ACTIGALL) 500 MG tablet, TAKE 1 TABLET BY MOUTH TWICE A DAY, Disp: 180 tablet, Rfl: 3    Current Facility-Administered Medications:      lidocaine (PF) (XYLOCAINE) 1 % injection 3 mL, 3 mL, , , Ronaldo Doty MD, 3 mL at 09/13/22 1651     lidocaine (PF) (XYLOCAINE) 1 % injection 3 mL, 3 mL, , , Ronaldo Doty MD, 3 mL at 09/13/22 1651     triamcinolone (KENALOG-40) injection 40 mg, 40 mg, , , Ronaldo Doty MD, 40 mg at 09/13/22 1651     triamcinolone (KENALOG-40) injection 40 mg, 40 mg, , , Ronaldo Doty MD, 40 mg at 09/13/22 1651    Social history:   . Former . 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  Former smoker for 2 years, 0.75 packs/day, quit in 1988.  He used to chew tobacco and he stopped this in 2015.  He has not been drinking any alcohol since the liver transplant.    OBJECTIVE:    Wt Readings from Last 10 Encounters:   09/13/22 135.6 kg (299 lb)   09/08/22 131.5 kg (290 lb)   07/06/22 135.6 kg (299 lb)   06/06/22 130.2 kg (287 lb)   05/27/22 86.2 kg (190 lb)   05/27/22 130.2 kg (287 lb)    05/20/22 117.9 kg (260 lb)   05/02/22 119.3 kg (263 lb)   03/07/22 119.7 kg (264 lb)   02/28/22 115.7 kg (255 lb)     There were no vitals taken for this visit.     Lab Results   Component Value Date    A1C 6.0 (H) 09/27/2022    A1C 5.2 03/03/2022    A1C 7.1 (H) 08/18/2021    A1C 6.3 (H) 03/31/2021    A1C 6.7 (H) 02/12/2021    A1C 6.4 (H) 11/24/2020    A1C 6.2 (H) 10/06/2020    A1C 5.5 04/11/2018    HEMOGLOBINA1 5.3 12/06/2021    HEMOGLOBINA1 5.8 11/18/2019    HEMOGLOBINA1 6.1 (A) 11/19/2018       Hemoglobin   Date Value Ref Range Status   09/27/2022 11.5 (L) 13.3 - 17.7 g/dL Final   05/27/2021 11.9 (L) 13.3 - 17.7 g/dL Final     Hematocrit   Date Value Ref Range Status   09/27/2022 34.0 (L) 40.0 - 53.0 % Final   05/27/2021 34.7 (L) 40.0 - 53.0 % Final     Cholesterol   Date Value Ref Range Status   12/01/2021 157 <200 mg/dL Final   11/24/2020 134 <200 mg/dL Final     Cholesterol/HDL Ratio   Date Value Ref Range Status   07/03/2012 3.8 0.0 - 5.0 Final     HDL Cholesterol   Date Value Ref Range Status   11/24/2020 39 (L) >39 mg/dL Final     Direct Measure HDL   Date Value Ref Range Status   12/01/2021 38 (L) >=40 mg/dL Final     LDL Cholesterol Calculated   Date Value Ref Range Status   12/01/2021 78 <=100 mg/dL Final   11/24/2020 35 <100 mg/dL Final     Comment:     Desirable:       <100 mg/dl     VLDL-Cholesterol   Date Value Ref Range Status   07/03/2012 36 (H) 0 - 30 mg/dL Final     Triglycerides   Date Value Ref Range Status   12/01/2021 203 (H) <150 mg/dL Final   11/24/2020 298 (H) <150 mg/dL Final     Comment:     Borderline high:  150-199 mg/dl  High:             200-499 mg/dl  Very high:       >499 mg/dl       Albumin Urine mg/L   Date Value Ref Range Status   10/27/2017 122 mg/L Final     TSH   Date Value Ref Range Status   04/02/2018 2.74 0.40 - 4.00 mU/L Final     Last Basic Metabolic Panel:    Sodium   Date Value Ref Range Status   09/27/2022 139 133 - 144 mmol/L Final   06/08/2021 134 133 - 144 mmol/L  Final     Potassium   Date Value Ref Range Status   09/27/2022 5.5 (H) 3.4 - 5.3 mmol/L Final   06/08/2021 4.9 3.4 - 5.3 mmol/L Final     Chloride   Date Value Ref Range Status   09/27/2022 111 (H) 94 - 109 mmol/L Final   06/08/2021 105 94 - 109 mmol/L Final     Calcium   Date Value Ref Range Status   09/27/2022 8.9 8.5 - 10.1 mg/dL Final   06/08/2021 8.9 8.5 - 10.1 mg/dL Final     Carbon Dioxide   Date Value Ref Range Status   06/08/2021 23 20 - 32 mmol/L Final     Carbon Dioxide (CO2)   Date Value Ref Range Status   09/27/2022 22 20 - 32 mmol/L Final     Urea Nitrogen   Date Value Ref Range Status   09/27/2022 52 (H) 7 - 30 mg/dL Final   06/08/2021 43 (H) 7 - 30 mg/dL Final     Creatinine   Date Value Ref Range Status   09/27/2022 1.59 (H) 0.66 - 1.25 mg/dL Final   06/08/2021 1.51 (H) 0.66 - 1.25 mg/dL Final     GFR Estimate   Date Value Ref Range Status   09/27/2022 50 (L) >60 mL/min/1.73m2 Final     Comment:     Effective December 21, 2021 eGFRcr in adults is calculated using the 2021 CKD-EPI creatinine equation which includes age and gender ([place] de [place] [employer] al., [place], DOI: 10.1056/WEHAmk6971602)   06/08/2021 51 (L) >60 mL/min/[1.73_m2] Final     Comment:     Non  GFR Calc  Starting 12/18/2018, serum creatinine based estimated GFR (eGFR) will be   calculated using the Chronic Kidney Disease Epidemiology Collaboration   (CKD-EPI) equation.       Glucose   Date Value Ref Range Status   09/27/2022 133 (H) 70 - 99 mg/dL Final   06/08/2021 249 (H) 70 - 99 mg/dL Final     GLUCOSE BY METER POCT   Date Value Ref Range Status   10/24/2022 80 70 - 99 mg/dL Final       AST   Date Value Ref Range Status   09/27/2022 17 0 - 45 U/L Final   05/27/2021 19 0 - 45 U/L Final     ALT   Date Value Ref Range Status   09/27/2022 29 0 - 70 U/L Final   05/27/2021 32 0 - 70 U/L Final     Albumin Urine mg/g Cr   Date Value Ref Range Status   10/27/2017 132.46 (H) 0 - 17 mg/g Cr Final     ASSESSMENT/PLAN:    Camacho Bhagat is a 57  year old man w/ PMx of CASTAÑEDA/HCC s/p liver transplant (3/2017), fibromyalgia, DVT, HTN, RONNIE, OA, and CVA who presents for follow-up of his DM-II     Type 2 Diabetes  A1c less reliable in the context of mild stable anemia.  Overall, the glycemic control is good.      Recommendations:  Change insulin to carb ratio from 12 noon to 12 midnight to 1 U per 6 gm CHO and from 12 midnight to 12 pm to 7.5 gm CHO  Advised the patient to verify the insulin pump records on [employer] and contact us when they are available.    Possible L1 compression fractures.  I am going to contact Soren Mayberry regarding the MRI findings.

## 2022-11-07 NOTE — NURSING NOTE
Patient declined individual allergy and medication review by support staff because of echeck in.  Brina Villarreal

## 2022-11-07 NOTE — LETTER
11/7/2022       RE: Camacho Bhagat  [address] [facility] [address]     Dear Colleague,    Thank you for referring your patient, Camacho Bhagat, to the [facility] ENDOCRINOLOGY CLINIC [place] at [facility]. Please see a copy of my visit note below.    Outcome for 10/31/22 2:18 PM: Data uploaded on Dexcom  Outcome for 10/31/22 2:18 PM: [employer] message sent - to upload omnipod on glooko.  SALVATORE Kim  Outcome for 11/03/22 7:50 AM: Patient replied to mychart he is continuously sharing on glooko. Data uploaded on glooko.   SALVATORE Weber  Outcome for 11/03/22 10:04 AM: Dexcom and Glooko emailed to provider  SALVATORE Weber            Camacho is a 57 year old who is being evaluated via a billable telephone visit.      What phone number would you like to be contacted at? [phone]  How would you like to obtain your AVS? MyChart    Due to the COVID 19 pandemic this visit was converted to a telephone visit in order to help prevent spread of infection in this patient and the general population.     Phone call start time: 8:44 am   Phone call end time: 9:07 am    Camacho Bhagat is a 57 year old male with PMHx of CASTAÑEDA/HCC s/p liver transplant (3/2017), fibromyalgia, CKD, DVT, HTN, RONNIE, OA, who presents for follow-up of type 2 DM, diagnosed in 2002.  Oral meds first, later placed on insulin.     Related history: h/o CASTAÑEDA cirrhosis and HCC s/p liver transplant 3/2017. Course complicated by acute abdomen/neutropenic fever requiring right hemicolectomy and colostomy Fall 2017. He later underwent colostomy takedown 1/5/18 which was complicated by abdominal wall abscess at a drain site. During this course, he has also developed excessive proteinuria.    Hemoglobin A1c was 6% on 9/27/22.  I reviewed both insulin pump and sensor records.    Insulin pump (Omnipod Dash - 8/2021) settings:  Basal rate   12 AM 1.45 units/h  3 AM 1.4 units/h  3 PM 1.35  units/h   6 PM 1.4 units/h   9 PM 1.45 units/h  Insulin to carbohydrate ratio 1 unit per 7.5 g at 12 AM and 7 g at 12 PM  Sensitivity 25   Blood glucose correction threshold 125  Glucose target range 100  Minimum BG for bolus calculation 70  He uses Humalog 200 pens to fill the pump reservoir.     Unfortunately, the insulin pump records from [facility] do not document the insulin use and administration.  According to the patient, the total daily insulin dose for yesterday was 85 units, of which 33.75 U are basal insulin.  The average glucose on the sensor is 195, with a standard deviation of 75 and CV of 38%.  45% of the glucose numbers are within target of , 23% are above 250 and 1% are in the mild hypoglycemic range.  The glucose trend reveals a postprandial spike around 3 pm and an elevated bedtime BG , which translates into a higher BG during the night.  The sensor glucose reaches a tiffany around 9-10 am.     Breakfast is generally around 4:15 in the morning.  Lunch is at noon and dinner is around 6:30 PM.  He denies having bedtime snacks.  He feels he requires more insulin for bolus, especially for dinner and lunch.  In the past, he tried Victoza and he was not able to tolerate it due to projectile vomiting.    Related meds:  Prednisone started for transplant and the dose remains 2.5 mg per day.     Complications  + chronic complications.      1. Retinopathy: No known eye disease  Last eye exam was in 8/2022.  HDR per patient. He has cataracts.       2.  Nephropathy: yes. CKD IIIa . He has significant proteinuria and f/up with nephrology.  Recent GFR in the 50s. He was started on losartan on 4/4/18 and the dose was increased to 50 mg daily. Discontinued losartan around 3/2019 due to hyperkalemia.     3. Neuropathy.  Gabapentin was prescribed to control the neuropathic pain in the left foot, which started over 10 years ago.  He takes 600 mg TID. No ulcers or infection. No amputation.  He now has bilateral foot  pain.     4. Lipids: not on statin.   Last LDL 78, , HDL cholesterol 38 in 12/2021.     In general, he is able to recognize the hypoglycemic episodes: He feels flushed, warm and dizzy.  Denies experiencing tremor or palpitations.  The lowest blood glucose he remembers experiencing was in the 50s.  He does have glucagon injection at home.   Other pertinent data:   Vitamin D 39, PTH 31 on 9/27/22     LS XRay from 9/13/22 revealed an L1 vertebral compression fracture.  The fracture was not mentioned on the follow-up MRI.   The patient has a longstanding history of back pain, which started in 1993.  The lumbar spine x-ray was recommended as he complained of hip pain, for which he has been receiving intra-articular steroid injections.    DXA 2/2020  The most negative and valid T-score of -1.8 at the level of the right femoral neck. Compared with baseline 2019 exam there was a significant decrease of BMD at the level of the lumbar spine, left total hip and right total hip.  (-2.6%; -17.4%; -18.2%).     Past Medical History  Past Medical History:   Diagnosis Date     Depressive disorder, not elsewhere classified      Diabetes type 2, controlled (H) 11/10/2016     Esophageal reflux      Fibromyalgia 1/2009    dx with Dr Benitez( Rheum)     Gangrene of finger (H) 8/25/2017     H/O deep venous thrombosis 11/2001    Permanent IVC filter in place.     H/O CASTAÑEDA (nonalcoholic steatohepatitis)      H/O Pneumonia, organism unspecified(486) 10/2001    Included ARDS, sepsis, and  acute renal failure; hospitalized, required tracheostomy placement.     H/O: HTN (hypertension) 11/2001    No longer prescribed antihypertensive medication.       History of CVA (cerebrovascular accident)      History of hepatocellular carcinoma      History of liver transplant (H)      History of obstructive sleep apnea     No longer wears CPAP since losing approximately 200 pounds with his liver transplant and its complications.       HLD  (hyperlipidemia)      Ischemia of both lower extremities 8/25/2017    Distal ischemia due to shock/high pressor requirements     Liver transplant rejection (H) 6/11/2018     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     Osteoarthritis      Presence of PERMANENT IVC filter      Rheumatoid arthritis(714.0)    Osteopenia 2020    Past Surgical History  Past Surgical History:   Procedure Laterality Date     BENCH LIVER N/A 3/4/2017    Procedure: BENCH LIVER;  Surgeon: Jovan Tran MD;  [facility]: [facility] OR     CHOLECYSTECTOMY       COLONOSCOPY N/A 7/21/2017    Procedure: COMBINED COLONOSCOPY, SINGLE OR MULTIPLE BIOPSY/POLYPECTOMY BY BIOPSY;  Colonoscopy;  Surgeon: Izaiah Montes MD;  [facility] GI     COLONOSCOPY N/A 10/24/2022    Procedure: COLONOSCOPY, WITH POLYPECTOMY AND BIOPSY;  Surgeon: Abril Mcneill MD;  [facility]     ENDOSCOPIC RETROGRADE CHOLANGIOPANCREATOGRAM N/A 5/22/2018    Procedure: COMBINED ENDOSCOPIC RETROGRADE CHOLANGIOPANCREATOGRAPHY, PLACE TUBE/STENT;  Endoscopic Retrograde Cholangiopancreatography with sphincterotomy and pancreatic duct stent placement;  Surgeon: Zay Gaitan MD;  [facility]     ENT SURGERY      Repair of deviated septum     ESOPHAGOSCOPY, GASTROSCOPY, DUODENOSCOPY (EGD), COMBINED N/A 8/4/2016    Procedure: COMBINED ESOPHAGOSCOPY, GASTROSCOPY, DUODENOSCOPY (EGD), BIOPSY SINGLE OR MULTIPLE;  Surgeon: Trent Pederson MD;  Location:  GI     ESOPHAGOSCOPY, GASTROSCOPY, DUODENOSCOPY (EGD), COMBINED N/A 8/27/2017    Procedure: COMBINED ESOPHAGOSCOPY, GASTROSCOPY, DUODENOSCOPY (EGD);;  Surgeon: Los Wynn MD;  [facility]     INCISION AND DRAINAGE ABDOMEN WASHOUT, COMBINED Right 2/14/2018    Procedure: COMBINED INCISION AND DRAINAGE ABDOMEN WASHOUT;  COMBINED INCISION AND DRAINAGE right ABDOMEN flank;  Surgeon: Sara Dinh MD;  [facility]     LAPAROTOMY EXPLORATORY N/A 7/4/2017    Procedure: LAPAROTOMY EXPLORATORY;   Exploratory Laparotomy, washout;  Surgeon: Tip Zhang MD;  [facility]     LAPAROTOMY EXPLORATORY N/A 2017    Procedure: LAPAROTOMY EXPLORATORY;  Exploratory Laparotomy, Washout with closure.;  Surgeon: Tip Zhang MD;  [facility]     LAPAROTOMY EXPLORATORY N/A 2017    Procedure: LAPAROTOMY EXPLORATORY;  Exploratory Laparotomy, Right angelique-colectomy, end ileostomy, mucosal fistula, partial omentectomy;  Surgeon: Sara Dinh MD;  [facility]     ORTHOPEDIC SURGERY Right     Repair of dislocated wrist.       RELEASE TRIGGER FINGER Right 11/10/2017    Procedure: RELEASE TRIGGER FINGER;  Debride, V-Y Flap Right Index Finger;  Surgeon: Momo Noonan MD;  [facility]     TAKEDOWN ILEOSTOMY N/A 2018    Procedure: TAKEDOWN ILEOSTOMY;  Exploratory Laparotomy, Lysis of Adhesions, Takedown Of End Ileostomy, Takedown of mucocutaneous fistula, ileocolic resection  And Colorectal Anastomosis, Primary repair of Ventral Hernia, Anesthesia Block ;  Surgeon: Sara Dinh MD;  [facility]     TRACHEOSTOMY  2001    During hospitalization for pneumonia.       TRANSPLANT LIVER RECIPIENT  DONOR N/A 3/4/2017    Procedure: TRANSPLANT LIVER RECIPIENT  DONOR;  Surgeon: Jovan Tran MD;  Location:  OR     [facility] TOTAL KNEE ARTHROPLASTY      Right knee arthroscopy        Medications    Current Outpatient Medications:      alcohol swab prep pads, Use to swab area of injection/francisca as directed., Disp: 100 each, Rfl: 3     alpha-lipoic acid 600 MG capsule, Take 600 mg by mouth, Disp: , Rfl:      amLODIPine (NORVASC) 10 MG tablet, Take 1 tablet (10 mg) by mouth daily, Disp: 90 tablet, Rfl: 3     blood glucose (NO BRAND SPECIFIED) lancets standard, Use to test blood sugar 1 times daily or as directed., Disp: 100 lancet, Rfl: 3     blood glucose (NO BRAND SPECIFIED) test strip, Use to test blood sugar 1 times daily or as directed.,  Disp: 50 strip, Rfl: 11     blood glucose (NO BRAND SPECIFIED) test strip, Use to test blood sugar 3 times daily or as directed. Any covered brand preferred by Pt that works with meter., Disp: 300 strip, Rfl: 3     blood glucose calibration (NO BRAND SPECIFIED) solution, Use to calibrate meter., Disp: 1 Bottle, Rfl: 3     blood glucose monitoring (NO BRAND SPECIFIED) meter device kit, Use to test blood sugar 3 times daily or as directed. Any covered brand preferred by Pt., Disp: 1 kit, Rfl: 1     Blood Glucose Monitoring Suppl (CONTOUR BLOOD GLUCOSE SYSTEM) w/Device KIT, For use with the Omnipod Dash insulin pump, Disp: 1 kit, Rfl: 1     cholecalciferol (VITAMIN D3) 1000 UNIT tablet, Take 1 tablet (1,000 Units) by mouth daily (Patient taking differently: Take 1,000 Units by mouth every morning), Disp: 100 tablet, Rfl: 3     Continuous Blood Gluc Sensor (DEXCOM G6 SENSOR) MISC, Change every 10 days., Disp: 9 each, Rfl: 3     Continuous Blood Gluc Transmit (DEXCOM G6 TRANSMITTER) MISC, Change every 3 months., Disp: 1 each, Rfl: 3     cyclobenzaprine (FLEXERIL) 10 MG tablet, Take 1 tablet (10 mg) by mouth 3 times daily as needed for muscle spasms, Disp: 90 tablet, Rfl: 3     doxazosin (CARDURA) 2 MG tablet, Take 1 tablet (2 mg) by mouth At Bedtime, Disp: 90 tablet, Rfl: 3     fluticasone (FLONASE) 50 MCG/ACT nasal spray, Spray 1 spray into both nostrils daily, Disp: 20 mL, Rfl: 3     furosemide (LASIX) 40 MG tablet, Take 60 mg AM and 40 mg afternoon, Disp: 180 tablet, Rfl: 3     gabapentin (NEURONTIN) 300 MG capsule, Take 2 capsules (600 mg) by mouth 3 times daily, Disp: 540 capsule, Rfl: 3     Glucose Blood (BLOOD GLUCOSE TEST STRIPS) STRP, 1 strip by Lancet route 3 times daily, Disp: 300 each, Rfl: 3     Insulin Disposable Pump (OMNIPOD DASH 5 PACK PODS) MISC, 1 each every 48 hours, Disp: 45 each, Rfl: 3     Insulin Disposable Pump (OMNIPOD DASH PODS, GEN 4,) MISC, 1 each daily Change every 1-2 day, Disp: 45 each,  Rfl: 3     Insulin Lispro (HUMALOG KWIKPEN) 200 UNIT/ML soln, To be used in the insulin pump. Total daily dose up to 170 U., Disp: 72 mL, Rfl: 3     insulin pen needle (BD ENE U/F) 32G X 4 MM miscellaneous, Use 1 pen needle 4 times daily or as directed., Disp: 400 each, Rfl: 1     lidocaine (LIDODERM) 5 % patch, Place 1 patch onto the skin every 24 hours To prevent lidocaine toxicity, patient should be patch free for 12 hrs daily., Disp: 30 patch, Rfl: 11     losartan (COZAAR) 25 MG tablet, Take 1 tablet (25 mg) by mouth daily, Disp: 90 tablet, Rfl: 3     metoprolol succinate ER (TOPROL-XL) 200 MG 24 hr tablet, Take 1 tablet (200 mg) by mouth daily, Disp: 90 tablet, Rfl: 3     mycophenolic acid (GENERIC EQUIVALENT) 360 MG EC tablet, Take 2 tablets (720 mg) by mouth every 12 hours, Disp: 360 tablet, Rfl: 3     oxyCODONE (ROXICODONE) 5 MG tablet, Take 1 tablet (5 mg) by mouth 4 times daily as needed for pain maximum 6 tablet(s) per day, Disp: 30 tablet, Rfl: 0     patiromer (VELTASSA) 8.4 g packet, Take 8.4 g by mouth daily, Disp: 90 each, Rfl: 3     predniSONE (DELTASONE) 2.5 MG tablet, TAKE 1 TABLET BY MOUTH EVERY DAY, Disp: 90 tablet, Rfl: 3     sildenafil (REVATIO) 20 MG tablet, Take 2 tablets (40 mg) by mouth daily as needed (erectile dysfunction), Disp: 10 tablet, Rfl: 11     tacrolimus (GENERIC EQUIVALENT) 1 MG capsule, Take 4 capsules (4 mg) by mouth every morning AND 3 capsules (3 mg) every evening., Disp: 630 capsule, Rfl: 3     thin (NO BRAND SPECIFIED) lancets, Use with lanceting device. Any covered brand., Disp: 300 each, Rfl: 3     ursodiol (ACTIGALL) 500 MG tablet, TAKE 1 TABLET BY MOUTH TWICE A DAY, Disp: 180 tablet, Rfl: 3    Current Facility-Administered Medications:      lidocaine (PF) (XYLOCAINE) 1 % injection 3 mL, 3 mL, , , Ronaldo Doty MD, 3 mL at 09/13/22 1651     lidocaine (PF) (XYLOCAINE) 1 % injection 3 mL, 3 mL, , , Ronaldo Doty MD, 3 mL at 09/13/22 1651      triamcinolone (KENALOG-40) injection 40 mg, 40 mg, , , Ronaldo Doty MD, 40 mg at 09/13/22 1651     triamcinolone (KENALOG-40) injection 40 mg, 40 mg, , , Ronaldo Doty MD, 40 mg at 09/13/22 1651    Social history:   . Former . 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  Former smoker for 2 years, 0.75 packs/day, quit in 1988.  He used to chew tobacco and he stopped this in 2015.  He has not been drinking any alcohol since the liver transplant.    OBJECTIVE:    Wt Readings from Last 10 Encounters:   09/13/22 135.6 kg (299 lb)   09/08/22 131.5 kg (290 lb)   07/06/22 135.6 kg (299 lb)   06/06/22 130.2 kg (287 lb)   05/27/22 86.2 kg (190 lb)   05/27/22 130.2 kg (287 lb)   05/20/22 117.9 kg (260 lb)   05/02/22 119.3 kg (263 lb)   03/07/22 119.7 kg (264 lb)   02/28/22 115.7 kg (255 lb)     There were no vitals taken for this visit.     Lab Results   Component Value Date    A1C 6.0 (H) 09/27/2022    A1C 5.2 03/03/2022    A1C 7.1 (H) 08/18/2021    A1C 6.3 (H) 03/31/2021    A1C 6.7 (H) 02/12/2021    A1C 6.4 (H) 11/24/2020    A1C 6.2 (H) 10/06/2020    A1C 5.5 04/11/2018    HEMOGLOBINA1 5.3 12/06/2021    HEMOGLOBINA1 5.8 11/18/2019    HEMOGLOBINA1 6.1 (A) 11/19/2018       Hemoglobin   Date Value Ref Range Status   09/27/2022 11.5 (L) 13.3 - 17.7 g/dL Final   05/27/2021 11.9 (L) 13.3 - 17.7 g/dL Final     Hematocrit   Date Value Ref Range Status   09/27/2022 34.0 (L) 40.0 - 53.0 % Final   05/27/2021 34.7 (L) 40.0 - 53.0 % Final     Cholesterol   Date Value Ref Range Status   12/01/2021 157 <200 mg/dL Final   11/24/2020 134 <200 mg/dL Final     Cholesterol/HDL Ratio   Date Value Ref Range Status   07/03/2012 3.8 0.0 - 5.0 Final     HDL Cholesterol   Date Value Ref Range Status   11/24/2020 39 (L) >39 mg/dL Final     Direct Measure HDL   Date Value Ref Range Status   12/01/2021 38 (L) >=40 mg/dL Final     LDL Cholesterol Calculated   Date Value Ref Range Status    12/01/2021 78 <=100 mg/dL Final   11/24/2020 35 <100 mg/dL Final     Comment:     Desirable:       <100 mg/dl     VLDL-Cholesterol   Date Value Ref Range Status   07/03/2012 36 (H) 0 - 30 mg/dL Final     Triglycerides   Date Value Ref Range Status   12/01/2021 203 (H) <150 mg/dL Final   11/24/2020 298 (H) <150 mg/dL Final     Comment:     Borderline high:  150-199 mg/dl  High:             200-499 mg/dl  Very high:       >499 mg/dl       Albumin Urine mg/L   Date Value Ref Range Status   10/27/2017 122 mg/L Final     TSH   Date Value Ref Range Status   04/02/2018 2.74 0.40 - 4.00 mU/L Final     Last Basic Metabolic Panel:    Sodium   Date Value Ref Range Status   09/27/2022 139 133 - 144 mmol/L Final   06/08/2021 134 133 - 144 mmol/L Final     Potassium   Date Value Ref Range Status   09/27/2022 5.5 (H) 3.4 - 5.3 mmol/L Final   06/08/2021 4.9 3.4 - 5.3 mmol/L Final     Chloride   Date Value Ref Range Status   09/27/2022 111 (H) 94 - 109 mmol/L Final   06/08/2021 105 94 - 109 mmol/L Final     Calcium   Date Value Ref Range Status   09/27/2022 8.9 8.5 - 10.1 mg/dL Final   06/08/2021 8.9 8.5 - 10.1 mg/dL Final     Carbon Dioxide   Date Value Ref Range Status   06/08/2021 23 20 - 32 mmol/L Final     Carbon Dioxide (CO2)   Date Value Ref Range Status   09/27/2022 22 20 - 32 mmol/L Final     Urea Nitrogen   Date Value Ref Range Status   09/27/2022 52 (H) 7 - 30 mg/dL Final   06/08/2021 43 (H) 7 - 30 mg/dL Final     Creatinine   Date Value Ref Range Status   09/27/2022 1.59 (H) 0.66 - 1.25 mg/dL Final   06/08/2021 1.51 (H) 0.66 - 1.25 mg/dL Final     GFR Estimate   Date Value Ref Range Status   09/27/2022 50 (L) >60 mL/min/1.73m2 Final     Comment:     Effective December 21, 2021 eGFRcr in adults is calculated using the 2021 CKD-EPI creatinine equation which includes age and gender (Colten et al., NEJM, DOI: 10.1056/OJXKkt4370714)   06/08/2021 51 (L) >60 mL/min/[1.73_m2] Final     Comment:     Non  GFR  Calc  Starting 12/18/2018, serum creatinine based estimated GFR (eGFR) will be   calculated using the Chronic Kidney Disease Epidemiology Collaboration   (CKD-EPI) equation.       Glucose   Date Value Ref Range Status   09/27/2022 133 (H) 70 - 99 mg/dL Final   06/08/2021 249 (H) 70 - 99 mg/dL Final     GLUCOSE BY METER POCT   Date Value Ref Range Status   10/24/2022 80 70 - 99 mg/dL Final       AST   Date Value Ref Range Status   09/27/2022 17 0 - 45 U/L Final   05/27/2021 19 0 - 45 U/L Final     ALT   Date Value Ref Range Status   09/27/2022 29 0 - 70 U/L Final   05/27/2021 32 0 - 70 U/L Final     Albumin Urine mg/g Cr   Date Value Ref Range Status   10/27/2017 132.46 (H) 0 - 17 mg/g Cr Final     ASSESSMENT/PLAN:    Camacho Bhagat is a 57 year old man w/ PMx of CASTAÑEDA/HCC s/p liver transplant (3/2017), fibromyalgia, DVT, HTN, RONNIE, OA, and CVA who presents for follow-up of his DM-II     Type 2 Diabetes  A1c less reliable in the context of mild stable anemia.  Overall, the glycemic control is good.      Recommendations:  Change insulin to carb ratio from 12 noon to 12 midnight to 1 U per 6 gm CHO and from 12 midnight to 12 pm to 7.5 gm CHO  Advised the patient to verify the insulin pump records on [employer] and contact us when they are available.    Possible L1 compression fractures.  I am going to contact Soren Mayberry regarding the MRI findings.       Alisa West MD

## 2022-11-08 DIAGNOSIS — Z79.4 TYPE 2 DIABETES MELLITUS WITH DIABETIC NEPHROPATHY, WITH LONG-TERM CURRENT USE OF INSULIN (H): ICD-10-CM

## 2022-11-08 DIAGNOSIS — E11.21 TYPE 2 DIABETES MELLITUS WITH DIABETIC NEPHROPATHY, WITH LONG-TERM CURRENT USE OF INSULIN (H): ICD-10-CM

## 2022-11-08 RX ORDER — INSULIN PUMP CONTROLLER
1 EACH MISCELLANEOUS
Qty: 45 EACH | Refills: 3 | Status: SHIPPED | OUTPATIENT
Start: 2022-11-08 | End: 2022-11-09

## 2022-11-08 RX ORDER — INSULIN PMP CART,AUT,G6/7,CNTR
1 EACH SUBCUTANEOUS SEE ADMIN INSTRUCTIONS
Qty: 1 EACH | Refills: 1 | Status: SHIPPED | OUTPATIENT
Start: 2022-11-08

## 2022-11-09 ENCOUNTER — TELEPHONE (OUTPATIENT)
Dept: ENDOCRINOLOGY | Facility: CLINIC | Age: 57
End: 2022-11-09

## 2022-11-09 ENCOUNTER — MYC MEDICAL ADVICE (OUTPATIENT)
Dept: ENDOCRINOLOGY | Facility: CLINIC | Age: 57
End: 2022-11-09

## 2022-11-09 DIAGNOSIS — Z79.4 TYPE 2 DIABETES MELLITUS WITH DIABETIC POLYNEUROPATHY, WITH LONG-TERM CURRENT USE OF INSULIN (H): Primary | ICD-10-CM

## 2022-11-09 DIAGNOSIS — E11.42 TYPE 2 DIABETES MELLITUS WITH DIABETIC POLYNEUROPATHY, WITH LONG-TERM CURRENT USE OF INSULIN (H): Primary | ICD-10-CM

## 2022-11-09 RX ORDER — INSULIN PMP CART,AUT,G6/7,CNTR
1 EACH SUBCUTANEOUS SEE ADMIN INSTRUCTIONS
Qty: 45 EACH | Refills: 3 | Status: SHIPPED | OUTPATIENT
Start: 2022-11-09 | End: 2023-05-09

## 2022-11-09 NOTE — TELEPHONE ENCOUNTER
Hello,    I just want to make sure that the Omnipod 5 G6 Pod misc are going to [facility]:      I'm only asking because in the telephone encounter from today(11/09/2022) Char Bass states that the patient wants to use [facility].    Thank You!    [facility] Liaison  [facility]  [email or web address]  Phone: [phone]  Fax: [phone]

## 2022-11-09 NOTE — TELEPHONE ENCOUNTER
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 He is currently using the omni pod dash  with U 200 insulin. Dr West was to check if U200 insulin can be used with the  Omnipod 5 . I see a script went to [facility] but he wants to use [facility] pharmacy. Can you  contact him for what understanding of U 500 and Omnipod 5 .     He never heard back if he was to  use the U200 as an experiment  in the Omnipod 5 . Please clarify what is needed and what can be done . If an issue he may stay with  Omnipod Dash .  Char Bass RN on 11/9/2022 at 11:54 AM

## 2022-11-09 NOTE — TELEPHONE ENCOUNTER
Phone call to patient.  He says Dr. Wets recommended a change to OP5.  She was going to talk with a nurse at [employer] and get back to him. Also, he would like to self-start.  All he has to do is transfer current settings into new pump.  Will check with his provider and get back to him. Arely Williamson RN,CDE

## 2022-11-09 NOTE — TELEPHONE ENCOUNTER
Patient called back and stated that he is not using the Omnipod 5. He is using the Omnipod dash. He also stated that  wrote a prescription and he wants to make sure it is for the right Omnipod. Please follow up. Thank you.

## 2022-11-09 NOTE — TELEPHONE ENCOUNTER
LVM asking Pt to confirm which omnipod gen he is currently using.  Kenzie Stock RN on 11/9/2022 at 9:32 AM

## 2022-11-15 ENCOUNTER — TELEPHONE (OUTPATIENT)
Dept: ENDOCRINOLOGY | Facility: CLINIC | Age: 57
End: 2022-11-15

## 2022-11-16 NOTE — TELEPHONE ENCOUNTER
Pharm tech Liseth:   States they need the order for the omnipod 5 and pod with clear instructions on how often pt will be changing.   CDEs notified to please place orders. .   Kenzie Stock, RN on 11/17/2022 at 8:28 AM     Arely Pearl RN     TB    12:46 PM  Note     Phone call to patient.  He says Dr. West recommended a change to OP5.  She was going to talk with a nurse at [employer] and get back to him. Also, he would like to self-start.  All he has to do is transfer current settings into new pump.  Will check with his provider and get back to him. Arely Williamson RN,CDE

## 2022-11-17 ENCOUNTER — VIRTUAL VISIT (OUTPATIENT)
Dept: ORTHOPEDICS | Facility: CLINIC | Age: 57
End: 2022-11-17
Payer: COMMERCIAL

## 2022-11-17 DIAGNOSIS — M51.369 DDD (DEGENERATIVE DISC DISEASE), LUMBAR: Primary | ICD-10-CM

## 2022-11-17 DIAGNOSIS — M51.16 LUMBAR DISC HERNIATION WITH RADICULOPATHY: ICD-10-CM

## 2022-11-17 PROCEDURE — 99214 OFFICE O/P EST MOD 30 MIN: CPT | Mod: 95 | Performed by: PREVENTIVE MEDICINE

## 2022-11-17 NOTE — TELEPHONE ENCOUNTER
After discussion with Dr. West it was determined that Camacho is not a good candidate for OP5 at this time.  A voicemail was left for Camacho letting him know this.    Called [facility] and asked them to cancel order for OP5. Aerly Williamson RN,CDE

## 2022-11-17 NOTE — PROGRESS NOTES
Patient is a  57   year old who is being evaluated via a billable telephone visit.      What phone number would you like to be contacted at? CELL  How would you like to obtain your AVS? KAIA        Subjective   Patient is a   57  year old who presents by phone call visit for the following: f/u for lumbar MRI  Continues to have pain and radicular pain    HPI       Review of Systems   Constitutional, HEENT, cardiovascular, pulmonary, gi and gu systems are negative, except as otherwise noted.      Objective           Vitals:  No vitals were obtained today due to virtual visit.    Physical Exam   healthy, alert and no distress  PSYCH: Alert and oriented times 3; coherent speech, normal   rate and volume, able to articulate logical thoughts, able   to abstract reason, no tangential thoughts, no hallucinations   or delusions  His affect is normal  RESP: No cough, no audible wheezing, able to talk in full sentences  Remainder of exam unable to be completed due to telephone visits    Assessment/Plan  56 yo male with lumbar ddd, disc herniations, radicular pain, not resolved    I independently reviewed the following imaging studies and discussed with patient:  Lumbar MRI: shows ddd, disc herniations  Discussed and ordered lumbar DANNY  Cont. HEP and medications  F/u after           Phone call duration: 20 minutes  Phone call start: 710am  Phone call end: 730am  Dr Doty

## 2022-11-17 NOTE — LETTER
11/17/2022         RE: Camacho Bhagat  [address]  [facility] [address]        Dear Colleague,    Thank you for referring your patient, Camacho Bhagat, to the [facility] [place]. Please see a copy of my visit note below.    Patient is a  57   year old who is being evaluated via a billable telephone visit.      What phone number would you like to be contacted at? CELL  How would you like to obtain your AVS? MYCHART        Subjective   Patient is a   57  year old who presents by phone call visit for the following: f/u for lumbar MRI  Continues to have pain and radicular pain    HPI       Review of Systems   Constitutional, HEENT, cardiovascular, pulmonary, gi and gu systems are negative, except as otherwise noted.      Objective           Vitals:  No vitals were obtained today due to virtual visit.    Physical Exam   healthy, alert and no distress  PSYCH: Alert and oriented times 3; coherent speech, normal   rate and volume, able to articulate logical thoughts, able   to abstract reason, no tangential thoughts, no hallucinations   or delusions  His affect is normal  RESP: No cough, no audible wheezing, able to talk in full sentences  Remainder of exam unable to be completed due to telephone visits    Assessment/Plan  56 yo male with lumbar ddd, disc herniations, radicular pain, not resolved    I independently reviewed the following imaging studies and discussed with patient:  Lumbar MRI: shows ddd, disc herniations  Discussed and ordered lumbar DANNY  Cont. HEP and medications  F/u after           Phone call duration: 20 minutes  Phone call start: 710am  Phone call end: 730am  Dr Doty      Again, thank you for allowing me to participate in the care of your patient.        Sincerely,        Ronaldo Doty MD

## 2022-11-17 NOTE — LETTER
11/17/2022         RE: Camacho Bhagat  [address]  [facility] [address]        Dear Colleague,    Thank you for referring your patient, Camacho Bhagat, to the [facility] [place]. Please see a copy of my visit note below.    Patient is a  57   year old who is being evaluated via a billable telephone visit.      What phone number would you like to be contacted at? CELL  How would you like to obtain your AVS? MYCHART        Subjective   Patient is a   57  year old who presents by phone call visit for the following: f/u for lumbar MRI  Continues to have pain and radicular pain    HPI       Review of Systems   Constitutional, HEENT, cardiovascular, pulmonary, gi and gu systems are negative, except as otherwise noted.      Objective           Vitals:  No vitals were obtained today due to virtual visit.    Physical Exam   healthy, alert and no distress  PSYCH: Alert and oriented times 3; coherent speech, normal   rate and volume, able to articulate logical thoughts, able   to abstract reason, no tangential thoughts, no hallucinations   or delusions  His affect is normal  RESP: No cough, no audible wheezing, able to talk in full sentences  Remainder of exam unable to be completed due to telephone visits    Assessment/Plan  58 yo male with lumbar ddd, disc herniations, radicular pain, not resolved    I independently reviewed the following imaging studies and discussed with patient:  Lumbar MRI: shows ddd, disc herniations  Discussed and ordered lumbar DANNY  Cont. HEP and medications  F/u after           Phone call duration: 20 minutes  Phone call start: 710am  Phone call end: 730am  Dr Doty      Again, thank you for allowing me to participate in the care of your patient.        Sincerely,        Ronaldo Doty MD

## 2022-12-05 ENCOUNTER — OFFICE VISIT (OUTPATIENT)
Dept: INTERNAL MEDICINE | Facility: CLINIC | Age: 57
End: 2022-12-05
Payer: COMMERCIAL

## 2022-12-05 VITALS
DIASTOLIC BLOOD PRESSURE: 83 MMHG | BODY MASS INDEX: 39.6 KG/M2 | WEIGHT: 292 LBS | OXYGEN SATURATION: 97 % | HEART RATE: 66 BPM | SYSTOLIC BLOOD PRESSURE: 167 MMHG

## 2022-12-05 DIAGNOSIS — M54.16 LUMBAR RADICULOPATHY: ICD-10-CM

## 2022-12-05 DIAGNOSIS — D84.9 IMMUNOSUPPRESSION (H): ICD-10-CM

## 2022-12-05 DIAGNOSIS — M06.9 RHEUMATOID ARTHRITIS INVOLVING MULTIPLE SITES, UNSPECIFIED WHETHER RHEUMATOID FACTOR PRESENT (H): ICD-10-CM

## 2022-12-05 DIAGNOSIS — I10 ESSENTIAL HYPERTENSION: ICD-10-CM

## 2022-12-05 DIAGNOSIS — E66.01 MORBID OBESITY (H): ICD-10-CM

## 2022-12-05 DIAGNOSIS — N52.9 VASCULOGENIC ERECTILE DYSFUNCTION, UNSPECIFIED VASCULOGENIC ERECTILE DYSFUNCTION TYPE: ICD-10-CM

## 2022-12-05 DIAGNOSIS — C22.0 HEPATOCELLULAR CARCINOMA (H): ICD-10-CM

## 2022-12-05 DIAGNOSIS — Z94.4 LIVER REPLACED BY TRANSPLANT (H): ICD-10-CM

## 2022-12-05 DIAGNOSIS — M62.830 LUMBAR PARASPINAL MUSCLE SPASM: ICD-10-CM

## 2022-12-05 PROBLEM — Z93.9 HISTORY OF CREATION OF OSTOMY (H): Status: RESOLVED | Noted: 2017-10-30 | Resolved: 2022-12-05

## 2022-12-05 PROBLEM — Z93.2 ILEOSTOMY IN PLACE (H): Status: RESOLVED | Noted: 2018-01-05 | Resolved: 2022-12-05

## 2022-12-05 PROBLEM — Z43.2 ENCOUNTER FOR ATTENTION TO ILEOSTOMY (H): Status: RESOLVED | Noted: 2017-11-16 | Resolved: 2022-12-05

## 2022-12-05 PROCEDURE — 99214 OFFICE O/P EST MOD 30 MIN: CPT | Performed by: INTERNAL MEDICINE

## 2022-12-05 RX ORDER — METOPROLOL SUCCINATE 200 MG/1
200 TABLET, EXTENDED RELEASE ORAL DAILY
Qty: 90 TABLET | Refills: 3 | Status: SHIPPED | OUTPATIENT
Start: 2022-12-05 | End: 2024-01-11

## 2022-12-05 RX ORDER — CYCLOBENZAPRINE HCL 10 MG
10 TABLET ORAL 3 TIMES DAILY PRN
Qty: 90 TABLET | Refills: 3 | Status: SHIPPED | OUTPATIENT
Start: 2022-12-05

## 2022-12-05 RX ORDER — URSODIOL 500 MG/1
500 TABLET, FILM COATED ORAL 2 TIMES DAILY
Qty: 180 TABLET | Refills: 3 | Status: SHIPPED | OUTPATIENT
Start: 2022-12-05 | End: 2023-01-31

## 2022-12-05 RX ORDER — SILDENAFIL CITRATE 20 MG/1
40 TABLET ORAL DAILY PRN
Qty: 10 TABLET | Refills: 11 | Status: SHIPPED | OUTPATIENT
Start: 2022-12-05

## 2022-12-05 RX ORDER — LIDOCAINE 50 MG/G
1 PATCH TOPICAL EVERY 24 HOURS
Qty: 30 PATCH | Refills: 11 | Status: SHIPPED | OUTPATIENT
Start: 2022-12-05

## 2022-12-05 NOTE — PROGRESS NOTES
ASSESSMENT/PLAN:  Patient is here for review of multiple conditions.     Need for vaccination  Due for TDAP booster, last dose 9/7/2012. Patient to check with insurance to make appointment at appropriate location, whether that is a pharmacy or a nurse visit at the [facility].     Diabetes with CKD IIIa and neuropathy  Working with endocrinology and last seen on 11/7/22. He is using an insulin pump that is managed by endocrinology.They discussed updating his insulin pump to the Omnipod 5, however this pump is not approved for his type of insulin. Eye exam completed in August and was prescribed eyeglasses. Last lipid panel 12/2/21 with triglyceride 203, HDL 38, Cholesterol 157, and LDL cholesterol calculated 78. Neuropathy controlled with gabapentin and alpha-lipoic acid.  -Next endocrine appointment is 5/8/23, they will check lipid panel per patient  -Continue gabapentin 300mg capsules, two capsules (600mg) by mouth three times a day    Low back pain  Saw Sports Medicine on 11/17/22. They ordered epidural steroid injections and placed the order to schedule appointment at [facility].   -Continue cyclobenzaprine 10mg tablet, one tablet by mouth three times daily as needed  -Continue oxycodone 5mg tablet, take 1 tablet by mouth four times daily as needed for pain, maximum is 6 tablets per day  -Continue lidocaine patches as needed  -Patient provided phone number to scheduled DANNY here, as [place] is far from his home in [place]    Liver transplant after CASTAÑEDA and hepatocellular carcinoma  Working with gastroenterology for management post transplant.   -Continue tacrolimus 4mg in the AM and 3mg in the evening, mycophenolic acid 720mg every 12 hours, prednisone 2.5mg daily, and ursodiol 500mg two times a day.  -Follow-up with gastroenterology 1/31/23    Hypertension  167/83 at visit today. BP is typically higher at appointments and better at home. Average is in the 140s systolic at home. Currently on five medications  for blood pressure. He is also on sildenafil as needed.  -Continue to work with nephrology to manage hypertension  -Continue amlodipine 10mg daily, doxazosin 2mg daily at bedtime, furosemide 60mg in the AM and 40mg in the afternoon, losartan 25mg daily, metoprolol succinate ER 200mg daily  -Okay to continue sildenafil 40mg daily as needed  -Check blood pressures regularly at home or at work and write them down for trending, which will help with management   -Continue to work towards weight loss    CKD IIIa  Last seen by nephrology on 9/8/22 and note reviewed. Improvement in proteinuria after addition of losartan. Following potassium levels, with last check 5.5 on 9/27/22.   -Continue to work towards blood pressure control  -Continue patiromer 8.4g packet daily   -Follow-up with nephrology planned for March 2023 (6 month intervals)    RONNIE, well controlled  -Continue to use CPAP nightly.     Follow-up in 6 months.     Patient seen and discussed with Dr. Shay Villasenor, Nurse Practitioner Student  [facility]     I, Shay Kirkpatrick, was present with the NP student who participated in the service and in the documentation of the note.  I have verified the history and personally performed the physical exam and medical decision making.  I agree with the assessment and plan of care as documented in the note.    Shay Kirkpatrick MD, FACP    Number and complexity of problems:  2 unstable chronic illness or with exacerbation (low back pain, hypertension)  2 stable chronic illnesses (diabetes, CKD)    Amount and Complexity of Data Reviewed/Analyzed:  >3 External notes reviewed  2 Unique test reviewed    Risk of complication/morbidity of patient management:  Patient receiving prescription management        SUBJECTIVE:  Camacho Bhagat is a 57 year old male with significant medical history of DBMII, HTN, liver transplant after CASTAÑEDA and hepatocellular carcinoma, CVA, DVT, osteoarthritis, and CKDIIIa who presents  today for 6 month follow-up of the above conditions.     Lower back pain has not changed. He continues to use oxycodone sparingly when his pain reaches an intolerable threshold. The Flexeril helps with his back at times, but he uses it more for other muscle pains. The lidocaine patches do provide some relief, but are difficult to place on his lower back by himself. Sports medicine prescribed DANNY for him. They set him up at [place] for injections, but he lives in [place] 30 minutes east of [place] in good traffic, so [place] is quite a distance for him. He is requesting to get the injections here at [facility].     His blood pressure is high upon arrival today. Camacho periodically checks his blood pressure at home and does not write them down. Normally they are systolic of 140-150s at home. He is not having headaches, vision changes, chest pain, or heart palpitations.       Camacho is on 6 month follow-up intervals with nephrology, endocrinology, and gastroenterology. He is staying up to date with these and has no concerns today. He did have an eye exam last August where he was prescribed eye glasses, as his job requires a lot of screen time.       CONSTITUTIONAL: Negative for fever, chills, night sweats, or weight change  EYES: Negative for vision changes or irritation  ENT: Negative for hearing changes, epistaxis or nasal drainage, oral lesions, or throat problems  RESP: Negative for significant cough or shortness of breath  BREAST: Negative for masses, tenderness or nipple discharge  CV: Negative for chest pain, palpitations   GI: Negative for nausea, vomiting, abdominal pain, heartburn, or change in bowel habits  : Negative for change in urine, hematuria, or sexual dysfunction  MUSCULOSKELETAL: Positive for joint and lower back pain  NEURO: Positive for chronic neuropathy in BLE   INTEGUMENTARY/SKIN: Negative for concerning rashes, moles or lesions  ENDOCRINE: Negative for temperature intolerance, skin/hair  changes, polyuria or polydipsia  HEME/LYMPH: Negative for bleeding/clotting problems, lymph node enlargement, frequent illnesses  PSYCHIATRIC: Negative for changes in mood or affect    Patient Active Problem List   Diagnosis     Carpal tunnel syndrome     Testicular hypofunction     Fibromyalgia     Sicca syndrome (H)     Medication refill- do not delete      Hepatocellular carcinoma (H)     Osteoarthritis     Rheumatoid arthritis (H)     Hyperkalemia     Liver transplant recipient (H)     Immunosuppressed status (H)     Neutropenic colitis (H)     S/P liver transplant (H)     Pancytopenia (H)     Ischemia of both lower extremities     Elevated serum creatinine     History of creation of ostomy (H)     Peristomal skin complication     Painful periwound skin     Encounter for attention to ileostomy (H)     Ileostomy in place (H)     Abscess, intra-abdominal, postoperative     Liver transplant rejection (H)     CMV colitis (H)     Type 2 diabetes mellitus with diabetic polyneuropathy, with long-term current use of insulin (H)     Diarrhea of infectious origin (Plesiomonas shigelloides (formerly known Aeromonas shigelloides) in 3/14/2019 sample)     Hypertension secondary to other renal disorders     Chronic kidney disease, stage 3 (H)     Morbid obesity (H)       Past Medical History:   Diagnosis Date     Depressive disorder, not elsewhere classified      Diabetes type 2, controlled (H) 11/10/2016     Esophageal reflux      Fibromyalgia 1/2009    dx with Dr Benitez( Rheum)     Gangrene of finger (H) 8/25/2017     H/O deep venous thrombosis 11/2001    Permanent IVC filter in place.     H/O CASTAÑEDA (nonalcoholic steatohepatitis)      H/O Pneumonia, organism unspecified(486) 10/2001    Included ARDS, sepsis, and  acute renal failure; hospitalized, required tracheostomy placement.     H/O: HTN (hypertension) 11/2001    No longer prescribed antihypertensive medication.       History of CVA (cerebrovascular accident)       History of hepatocellular carcinoma      History of liver transplant (H)      History of obstructive sleep apnea     No longer wears CPAP since losing approximately 200 pounds with his liver transplant and its complications.       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     Osteoarthritis      Presence of PERMANENT IVC filter      Rheumatoid arthritis(714.0)        Past Surgical History:   Procedure Laterality Date     BENCH LIVER N/A 3/4/2017    Procedure: BENCH LIVER;  Surgeon: Jovan Tran MD;  Location: [facility] OR     CHOLECYSTECTOMY       COLONOSCOPY N/A 7/21/2017    Procedure: COMBINED COLONOSCOPY, SINGLE OR MULTIPLE BIOPSY/POLYPECTOMY BY BIOPSY;  Colonoscopy;  Surgeon: Izaiah Montes MD;  Location: [facility] GI     COLONOSCOPY N/A 10/24/2022    Procedure: COLONOSCOPY, WITH POLYPECTOMY AND BIOPSY;  Surgeon: Abril Mcneill MD;  Location: [facility] GI     ENDOSCOPIC RETROGRADE CHOLANGIOPANCREATOGRAM N/A 5/22/2018    Procedure: COMBINED ENDOSCOPIC RETROGRADE CHOLANGIOPANCREATOGRAPHY, PLACE TUBE/STENT;  Endoscopic Retrograde Cholangiopancreatography with sphincterotomy and pancreatic duct stent placement;  Surgeon: Zay Gaitan MD;  Location: [facility] OR     ENT SURGERY      Repair of deviated septum     ESOPHAGOSCOPY, GASTROSCOPY, DUODENOSCOPY (EGD), COMBINED N/A 8/4/2016    Procedure: COMBINED ESOPHAGOSCOPY, GASTROSCOPY, DUODENOSCOPY (EGD), BIOPSY SINGLE OR MULTIPLE;  Surgeon: Trent Pederson MD;  Location:  GI     ESOPHAGOSCOPY, GASTROSCOPY, DUODENOSCOPY (EGD), COMBINED N/A 8/27/2017    Procedure: COMBINED ESOPHAGOSCOPY, GASTROSCOPY, DUODENOSCOPY (EGD);;  Surgeon: Los Wynn MD;  [facility]     INCISION AND DRAINAGE ABDOMEN WASHOUT, COMBINED Right 2/14/2018    Procedure: COMBINED INCISION AND DRAINAGE ABDOMEN WASHOUT;   COMBINED INCISION AND DRAINAGE right ABDOMEN flank;  Surgeon: Sara Dinh MD;  [facility] OR     LAPAROTOMY EXPLORATORY N/A 2017    Procedure: LAPAROTOMY EXPLORATORY;  Exploratory Laparotomy, washout;  Surgeon: Tip Zhang MD;  [facility]     LAPAROTOMY EXPLORATORY N/A 2017    Procedure: LAPAROTOMY EXPLORATORY;  Exploratory Laparotomy, Washout with closure.;  Surgeon: Tip Zhang MD;  [facility]     LAPAROTOMY EXPLORATORY N/A 2017    Procedure: LAPAROTOMY EXPLORATORY;  Exploratory Laparotomy, Right angelique-colectomy, end ileostomy, mucosal fistula, partial omentectomy;  Surgeon: Sara Dinh MD;  [facility]     ORTHOPEDIC SURGERY Right     Repair of dislocated wrist.       RELEASE TRIGGER FINGER Right 11/10/2017    Procedure: RELEASE TRIGGER FINGER;  Debride, V-Y Flap Right Index Finger;  Surgeon: Momo Noonan MD;  [facility]     TAKEDOWN ILEOSTOMY N/A 2018    Procedure: TAKEDOWN ILEOSTOMY;  Exploratory Laparotomy, Lysis of Adhesions, Takedown Of End Ileostomy, Takedown of mucocutaneous fistula, ileocolic resection  And Colorectal Anastomosis, Primary repair of Ventral Hernia, Anesthesia Block ;  Surgeon: Sara Dinh MD;  [facility]     TRACHEOSTOMY  2001    During hospitalization for pneumonia.       TRANSPLANT LIVER RECIPIENT  DONOR N/A 3/4/2017    Procedure: TRANSPLANT LIVER RECIPIENT  DONOR;  Surgeon: Jovan Tran MD;  Location:  OR     [facility] TOTAL KNEE ARTHROPLASTY      Right knee arthroscopy       Family History   Problem Relation Age of Onset     Diabetes Father      Hypertension Father      Substance Abuse Father      Arthritis Mother      Thyroid Cancer Mother         Survivor!     Cervical Cancer Maternal Grandmother      Cerebrovascular Disease Maternal Grandfather      No Known Problems Paternal Grandmother      Prostate Cancer Paternal Grandfather      Colon  Cancer No family hx of      Hyperlipidemia No family hx of      Coronary Artery Disease No family hx of      Breast Cancer No family hx of        Social History     Socioeconomic History     Marital status:      Spouse name: Not on file     Number of children: 1     Years of education: Not on file     Highest education level: Not on file   Occupational History     Occupation:     Tobacco Use     Smoking status: Former     Packs/day: 0.25     Years: 2.00     Pack years: 0.50     Types: Cigarettes     Quit date:      Years since quittin.9     Smokeless tobacco: Former     Types: Chew     Quit date: 10/8/2015     Tobacco comments:     1 Cigar once every other month.   Substance and Sexual Activity     Alcohol use: No     Alcohol/week: 0.0 standard drinks     Comment: No alcohol since liver transplant.       Drug use: No     Sexual activity: Not Currently     Partners: Female   Other Topics Concern     Parent/sibling w/ CABG, MI or angioplasty before 65F 55M? 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             Health Maintenance   Topic Date Due     ADVANCE CARE PLANNING  Never done     ZOSTER IMMUNIZATION (1 of 2) Never done     DIABETIC FOOT EXAM  2011     URINE DRUG SCREEN  2017     EYE EXAM  2019     YEARLY PREVENTIVE VISIT  2019     DTAP/TDAP/TD IMMUNIZATION (5 - Td or Tdap) 2022     LIPID  2022     A1C  2023     MICROALBUMIN  2023     BMP  2023     HEMOGLOBIN  2023     COLORECTAL CANCER SCREENING  10/24/2027     Pneumococcal Vaccine: Pediatrics (0 to 5 Years) and At-Risk Patients (6 to 64 Years) (4 - PPSV23 if available, else PCV20) 10/03/2029     HEPATITIS C SCREENING  Completed     HIV SCREENING  Completed     PHQ-2 (once per calendar year)  Completed     INFLUENZA VACCINE  Completed     URINALYSIS  Completed     HEPATITIS B IMMUNIZATION  Completed      COVID-19 Vaccine  Completed     IPV IMMUNIZATION  Aged Out     MENINGITIS IMMUNIZATION  Aged Out       Current Outpatient Medications   Medication     alcohol swab prep pads     alpha-lipoic acid 600 MG capsule     amLODIPine (NORVASC) 10 MG tablet     blood glucose (NO BRAND SPECIFIED) lancets standard     blood glucose (NO BRAND SPECIFIED) test strip     blood glucose calibration (NO BRAND SPECIFIED) solution     Blood Glucose Monitoring Suppl (CONTOUR BLOOD GLUCOSE SYSTEM) w/Device KIT     cholecalciferol (VITAMIN D3) 1000 UNIT tablet     Continuous Blood Gluc Sensor (DEXCOM G6 SENSOR) MISC     Continuous Blood Gluc Transmit (DEXCOM G6 TRANSMITTER) MISC     cyclobenzaprine (FLEXERIL) 10 MG tablet     doxazosin (CARDURA) 2 MG tablet     fluticasone (FLONASE) 50 MCG/ACT nasal spray     furosemide (LASIX) 40 MG tablet     gabapentin (NEURONTIN) 300 MG capsule     Glucose Blood (BLOOD GLUCOSE TEST STRIPS) STRP     Insulin Disposable Pump (OMNIPOD 5 G6 POD, GEN 5,) MISC     Insulin Disposable Pump (OMNIPOD 5 G6 POD, GEN 5,) MISC     Insulin Lispro (HUMALOG KWIKPEN) 200 UNIT/ML soln     insulin pen needle (BD ENE U/F) 32G X 4 MM miscellaneous     lidocaine (LIDODERM) 5 % patch     losartan (COZAAR) 25 MG tablet     metoprolol succinate ER (TOPROL-XL) 200 MG 24 hr tablet     mycophenolic acid (GENERIC EQUIVALENT) 360 MG EC tablet     oxyCODONE (ROXICODONE) 5 MG tablet     patiromer (VELTASSA) 8.4 g packet     predniSONE (DELTASONE) 2.5 MG tablet     sildenafil (REVATIO) 20 MG tablet     tacrolimus (GENERIC EQUIVALENT) 1 MG capsule     thin (NO BRAND SPECIFIED) lancets     ursodiol (ACTIGALL) 500 MG tablet       OBJECTIVE:  Physical Exam:  BP (!) 167/83 (BP Location: Right arm, Patient Position: Sitting, Cuff Size: Adult Large)   Pulse 66   Wt 132.5 kg (292 lb)   SpO2 97%   BMI 39.60 kg/m      General Appearance:  pleasant, well-dressed, in no apparent distress  Mental Status: alert and oriented x4, pleasant  demeanor, cooperative, recent and remote memory intact, speech fluid, coherent, appropriate  Head: scalp smooth, free of lesions. Well-spaced symmetrical features. No frontal or maxillary sinus tenderness upon palpation  Eyes: PERRLA +4mm bilaterally, anicteric, no strabismus or nystagmus  Ears: TMs gray and translucent, bony landmarks present bilaterally, hearing intact at normal conversational level   Nose: Nares patent bilaterally, nasal mucosa pink without discharge  Mouth: lips pink, dry, and without lesions, oral mucosa without lesions, oral pharynx without exudate  Neck: supple with no masses, no thyromegaly, full ROM   Lymph nodes: negative for lymphadenopathy, tenderness, or warmth  Cardiovascular: S1, S2 auscultated without murmurs, rubs, or gallops, no thrills, lifts, or heaves noted, no cyanosis, swelling, tenderness, trace peripheral edema  Lungs: Respiratory effort easy with equal expansion. Regular rate and rhythm. Lungs clear to auscultation bilaterally.   Abdomen: Soft, rounded, non-tender. Bowel sounds present in all 4 quadrants. No masses or organomegaly upon palpation. Old surgical scars healed and normal color.   Neuro: Cranial nerves intact. Motor and sensory examination of upper and lower extremities is grossly normal. Coordinated gait and intact balance   Musculoskeletal: +5/5 muscle strength in upper and lower extremities, equal bilaterally. Full active and passive ROM. Spine and extremities in good alignment.   Skin: pale, soft, warm, dry skin

## 2022-12-05 NOTE — NURSING NOTE
Camacho Bhagat is a 57 year old male that presents in clinic today for the following:     Chief Complaint   Patient presents with     Follow Up     Pt here to follow up       The patient's allergies and medications were reviewed. The patient's vitals were obtained without incident. The patient does not have any other questions for the provider.     Leidy Shaffer, EMT at 7:08 AM on 12/5/2022.  Primary Care Clinic: [phone]

## 2022-12-05 NOTE — PATIENT INSTRUCTIONS
Ask insurance about the Tdap vaccine to see where you should get it done.  Interventional Radiology:  [phone]    General Surgery:  [phone]    Neurology:  [phone]

## 2022-12-06 ENCOUNTER — TELEPHONE (OUTPATIENT)
Dept: TRANSPLANT | Facility: CLINIC | Age: 57
End: 2022-12-06

## 2022-12-06 DIAGNOSIS — Z13.220 LIPID SCREENING: ICD-10-CM

## 2022-12-06 DIAGNOSIS — Z94.4 LIVER REPLACED BY TRANSPLANT (H): Primary | ICD-10-CM

## 2022-12-06 NOTE — TELEPHONE ENCOUNTER
Transplant Lab/Orders  Route to LPN  Post Transplant Days: 2103  When patient is less than 60 days post-transplant, route high priority    Reason for Call: Annual lab reorder  Callback needed? No

## 2022-12-06 NOTE — LETTER
OUTPATIENT LABORATORY TEST ORDER   Patient copy/Reminder    Patient Name: Camacho Bhagat   YOB: 1965     [facility] MR# [if applicable]: [id number]   Date & Time: December 6, 2022  4:19 PM  Expiration Date: 1 year after date issued       Diagnosis: Liver Transplant (ICD-10 Z94.4)   Aftercare following organ transplant (ICD-10 Z48.288)   Long term use of medications (ICD-10 Z79.899)      We ask your assistance in obtaining the following laboratory tests, which are part of our routine surveillance program for Solid Organ Transplant patients.       Please fax each result to [phone], same day as resulted/available      Critical lab results page [phone]  Monday - Friday 8 am to 5 pm    Evening/Weekend/Holiday communicate Critical labs results [phone]    >1-year post-transplant  Every 2-3 months and as needed    CBC with Platelets     Basic Metabolic Panel     Hepatic panel     Tacrolimus drug level - 12 hour trough, please document time of last dose       >1-year post-transplant  Labs annually due December 2022     Fasting Lipid Panel    Urine protein/creatinine ratio    UA with reflex to micro      If you have any questions, please call [facility]- [phone] or [phone], Fax- [phone].    .

## 2022-12-08 ENCOUNTER — TELEPHONE (OUTPATIENT)
Dept: INTERNAL MEDICINE | Facility: CLINIC | Age: 57
End: 2022-12-08

## 2022-12-08 NOTE — TELEPHONE ENCOUNTER
[facility] w/ imaging number for pt to schedule XR injection ordered by Dr. Doty in ortho

## 2022-12-13 NOTE — ANESTHESIA CARE TRANSFER NOTE
Patient: Camacho Bhagat    Procedure(s):  Endoscopic Retrograde Cholangiopancreatography with sphincterotomy and pancreatic duct stent placement - Wound Class: II-Clean Contaminated    Diagnosis: Cholangitis   Diagnosis Additional Information: No value filed.    Anesthesia Type:   General, ETT     Note:  Airway :Face Mask  Patient transferred to:PACU  Comments: Patient breathing spontaneously, resting comfortably. VSS.  Flaco GipsonHandoff Report: Identifed the Patient, Identified the Reponsible Provider, Reviewed the pertinent medical history, Discussed the surgical course, Reviewed Intra-OP anesthesia mangement and issues during anesthesia, Set expectations for post-procedure period and Allowed opportunity for questions and acknowledgement of understanding      Vitals: (Last set prior to Anesthesia Care Transfer)    CRNA VITALS  5/22/2018 1124 - 5/22/2018 1200      5/22/2018             Pulse: 78    SpO2: 100 %                Electronically Signed By: Flaco Gipson MD  May 22, 2018  12:00 PM   Mid-Level Procedure Text (B): After obtaining clear surgical margins the patient was sent to a mid-level provider for surgical repair.  The patient understands they will receive post-surgical care and follow-up from the mid-level provider.

## 2023-01-12 ENCOUNTER — MYC MEDICAL ADVICE (OUTPATIENT)
Dept: INTERNAL MEDICINE | Facility: CLINIC | Age: 58
End: 2023-01-12

## 2023-01-12 DIAGNOSIS — M15.0 PRIMARY OSTEOARTHRITIS INVOLVING MULTIPLE JOINTS: ICD-10-CM

## 2023-01-13 RX ORDER — OXYCODONE HYDROCHLORIDE 5 MG/1
5 TABLET ORAL 4 TIMES DAILY PRN
Qty: 30 TABLET | Refills: 0 | Status: SHIPPED | OUTPATIENT
Start: 2023-01-13 | End: 2023-04-12

## 2023-01-27 ENCOUNTER — TELEPHONE (OUTPATIENT)
Dept: ENDOCRINOLOGY | Facility: CLINIC | Age: 58
End: 2023-01-27
Payer: COMMERCIAL

## 2023-01-27 NOTE — TELEPHONE ENCOUNTER
Patient called to state that this needs to be done on a high priority as he is completely out of sensors.

## 2023-01-27 NOTE — TELEPHONE ENCOUNTER
Prior Authorization Retail Medication Request    Medication/Dose: Dexcom G6 sensor  ICD code (if different than what is on RX):    Previously Tried and Failed:    Rationale:      Insurance Name:  [email or web address]  Insurance ID:  [id number]      Pharmacy Information (if different than what is on RX)  Name:  [facility]  Phone:  [phone]

## 2023-01-30 ENCOUNTER — LAB (OUTPATIENT)
Dept: LAB | Facility: CLINIC | Age: 58
End: 2023-01-30
Payer: COMMERCIAL

## 2023-01-30 DIAGNOSIS — N18.30 STAGE 3 CHRONIC KIDNEY DISEASE, UNSPECIFIED WHETHER STAGE 3A OR 3B CKD (H): ICD-10-CM

## 2023-01-30 DIAGNOSIS — Z13.220 LIPID SCREENING: ICD-10-CM

## 2023-01-30 DIAGNOSIS — Z94.4 LIVER REPLACED BY TRANSPLANT (H): ICD-10-CM

## 2023-01-30 DIAGNOSIS — N18.31 STAGE 3A CHRONIC KIDNEY DISEASE (H): ICD-10-CM

## 2023-01-30 LAB
ALBUMIN SERPL-MCNC: 3.8 G/DL (ref 3.4–5)
ALBUMIN UR-MCNC: 20 MG/DL
ALP SERPL-CCNC: 159 U/L (ref 40–150)
ALT SERPL W P-5'-P-CCNC: 27 U/L (ref 0–70)
ANION GAP SERPL CALCULATED.3IONS-SCNC: 3 MMOL/L (ref 3–14)
APPEARANCE UR: CLEAR
AST SERPL W P-5'-P-CCNC: 18 U/L (ref 0–45)
BACTERIA #/AREA URNS HPF: ABNORMAL /HPF
BILIRUB DIRECT SERPL-MCNC: 0.2 MG/DL (ref 0–0.2)
BILIRUB SERPL-MCNC: 0.6 MG/DL (ref 0.2–1.3)
BILIRUB UR QL STRIP: NEGATIVE
BUN SERPL-MCNC: 48 MG/DL (ref 7–30)
CALCIUM SERPL-MCNC: 8.9 MG/DL (ref 8.5–10.1)
CHLORIDE BLD-SCNC: 114 MMOL/L (ref 94–109)
CHOLEST SERPL-MCNC: 130 MG/DL
CO2 SERPL-SCNC: 22 MMOL/L (ref 20–32)
COLOR UR AUTO: ABNORMAL
CREAT SERPL-MCNC: 1.75 MG/DL (ref 0.66–1.25)
CREAT UR-MCNC: 86 MG/DL
ERYTHROCYTE [DISTWIDTH] IN BLOOD BY AUTOMATED COUNT: 12.9 % (ref 10–15)
FASTING STATUS PATIENT QL REPORTED: YES
GFR SERPL CREATININE-BSD FRML MDRD: 45 ML/MIN/1.73M2
GLUCOSE BLD-MCNC: 78 MG/DL (ref 70–99)
GLUCOSE UR STRIP-MCNC: NEGATIVE MG/DL
HCT VFR BLD AUTO: 35 % (ref 40–53)
HDLC SERPL-MCNC: 33 MG/DL
HGB BLD-MCNC: 11.3 G/DL (ref 13.3–17.7)
HGB UR QL STRIP: NEGATIVE
HYALINE CASTS: 7 /LPF
KETONES UR STRIP-MCNC: NEGATIVE MG/DL
LDLC SERPL CALC-MCNC: 51 MG/DL
LEUKOCYTE ESTERASE UR QL STRIP: NEGATIVE
MCH RBC QN AUTO: 30.8 PG (ref 26.5–33)
MCHC RBC AUTO-ENTMCNC: 32.3 G/DL (ref 31.5–36.5)
MCV RBC AUTO: 95 FL (ref 78–100)
MUCOUS THREADS #/AREA URNS LPF: PRESENT /LPF
NITRATE UR QL: NEGATIVE
NONHDLC SERPL-MCNC: 97 MG/DL
PH UR STRIP: 5 [PH] (ref 5–7)
PHOSPHATE SERPL-MCNC: 4 MG/DL (ref 2.5–4.5)
PLATELET # BLD AUTO: 108 10E3/UL (ref 150–450)
POTASSIUM BLD-SCNC: 5.3 MMOL/L (ref 3.4–5.3)
PROT SERPL-MCNC: 7.2 G/DL (ref 6.8–8.8)
PROT UR-MCNC: 0.29 G/L
PROT/CREAT 24H UR: 0.34 G/G CR (ref 0–0.2)
RBC # BLD AUTO: 3.67 10E6/UL (ref 4.4–5.9)
RBC URINE: 1 /HPF
SODIUM SERPL-SCNC: 139 MMOL/L (ref 133–144)
SP GR UR STRIP: 1.01 (ref 1–1.03)
SQUAMOUS EPITHELIAL: <1 /HPF
TACROLIMUS BLD-MCNC: 3.7 UG/L (ref 5–15)
TME LAST DOSE: ABNORMAL H
TME LAST DOSE: ABNORMAL H
TRIGL SERPL-MCNC: 231 MG/DL
UROBILINOGEN UR STRIP-MCNC: NORMAL MG/DL
WBC # BLD AUTO: 5.6 10E3/UL (ref 4–11)
WBC URINE: <1 /HPF

## 2023-01-30 PROCEDURE — 81001 URINALYSIS AUTO W/SCOPE: CPT

## 2023-01-30 PROCEDURE — 36415 COLL VENOUS BLD VENIPUNCTURE: CPT

## 2023-01-30 PROCEDURE — 84156 ASSAY OF PROTEIN URINE: CPT

## 2023-01-30 PROCEDURE — 80053 COMPREHEN METABOLIC PANEL: CPT

## 2023-01-30 PROCEDURE — 80061 LIPID PANEL: CPT

## 2023-01-30 PROCEDURE — 80197 ASSAY OF TACROLIMUS: CPT

## 2023-01-30 PROCEDURE — 82248 BILIRUBIN DIRECT: CPT

## 2023-01-30 PROCEDURE — 85027 COMPLETE CBC AUTOMATED: CPT

## 2023-01-31 ENCOUNTER — OFFICE VISIT (OUTPATIENT)
Dept: GASTROENTEROLOGY | Facility: CLINIC | Age: 58
End: 2023-01-31
Payer: COMMERCIAL

## 2023-01-31 VITALS
HEART RATE: 70 BPM | TEMPERATURE: 98.2 F | BODY MASS INDEX: 38.79 KG/M2 | DIASTOLIC BLOOD PRESSURE: 93 MMHG | WEIGHT: 286 LBS | SYSTOLIC BLOOD PRESSURE: 182 MMHG | OXYGEN SATURATION: 100 %

## 2023-01-31 DIAGNOSIS — Z94.4 LIVER REPLACED BY TRANSPLANT (H): ICD-10-CM

## 2023-01-31 DIAGNOSIS — Z94.4 HISTORY OF LIVER TRANSPLANT (H): ICD-10-CM

## 2023-01-31 DIAGNOSIS — Z94.4 LIVER TRANSPLANT RECIPIENT (H): ICD-10-CM

## 2023-01-31 DIAGNOSIS — Z79.60 LONG-TERM USE OF IMMUNOSUPPRESSANT MEDICATION: ICD-10-CM

## 2023-01-31 PROCEDURE — G0463 HOSPITAL OUTPT CLINIC VISIT: HCPCS | Performed by: INTERNAL MEDICINE

## 2023-01-31 PROCEDURE — 99214 OFFICE O/P EST MOD 30 MIN: CPT | Performed by: INTERNAL MEDICINE

## 2023-01-31 RX ORDER — PREDNISONE 2.5 MG/1
2.5 TABLET ORAL DAILY
Qty: 90 TABLET | Refills: 3 | Status: SHIPPED | OUTPATIENT
Start: 2023-01-31 | End: 2024-01-26

## 2023-01-31 RX ORDER — URSODIOL 500 MG/1
500 TABLET, FILM COATED ORAL 2 TIMES DAILY
Qty: 180 TABLET | Refills: 3 | Status: SHIPPED | OUTPATIENT
Start: 2023-01-31 | End: 2024-02-12

## 2023-01-31 RX ORDER — MYCOPHENOLIC ACID 360 MG/1
720 TABLET, DELAYED RELEASE ORAL EVERY 12 HOURS
Qty: 360 TABLET | Refills: 3 | Status: SHIPPED | OUTPATIENT
Start: 2023-01-31 | End: 2024-01-11

## 2023-01-31 RX ORDER — TACROLIMUS 1 MG/1
CAPSULE ORAL
Qty: 630 CAPSULE | Refills: 3 | Status: SHIPPED | OUTPATIENT
Start: 2023-01-31 | End: 2023-07-11

## 2023-01-31 ASSESSMENT — PAIN SCALES - GENERAL: PAINLEVEL: SEVERE PAIN (7)

## 2023-01-31 NOTE — PROGRESS NOTES
HISTORY OF PRESENT ILLNESS:  I had the pleasure of seeing Camacho Bhagat for followup in the Liver Transplant Clinic at the [facility] on 01/31/2023.  Mr. Bhagat returns for followup now almost 6 years status post liver transplantation.    He feels well at this visit.  He denies any abdominal pain, itching, skin rash, or fatigue.  He continues to work full time.  He denies any increased abdominal girth or lower extremity edema.  He has not had any gastrointestinal bleeding.    He denies any fevers or chills, cough or shortness of breath.  He denies any nausea or vomiting, diarrhea or constipation.  His appetite has been good and his weight for the most part has been stable.    He does report that his blood pressure has been under less than ideal control.  He does see Jovanna Foster in the Nephrology Clinic.    Current Outpatient Medications   Medication     alcohol swab prep pads     alpha-lipoic acid 600 MG capsule     amLODIPine (NORVASC) 10 MG tablet     blood glucose (NO BRAND SPECIFIED) lancets standard     blood glucose (NO BRAND SPECIFIED) test strip     blood glucose calibration (NO BRAND SPECIFIED) solution     Blood Glucose Monitoring Suppl (CONTOUR BLOOD GLUCOSE SYSTEM) w/Device KIT     cholecalciferol (VITAMIN D3) 1000 UNIT tablet     Continuous Blood Gluc Sensor (DEXCOM G6 SENSOR) MISC     Continuous Blood Gluc Transmit (DEXCOM G6 TRANSMITTER) MISC     cyclobenzaprine (FLEXERIL) 10 MG tablet     doxazosin (CARDURA) 2 MG tablet     fluticasone (FLONASE) 50 MCG/ACT nasal spray     furosemide (LASIX) 40 MG tablet     gabapentin (NEURONTIN) 300 MG capsule     Glucose Blood (BLOOD GLUCOSE TEST STRIPS) STRP     Insulin Disposable Pump (OMNIPOD 5 G6 POD, GEN 5,) MISC     Insulin Disposable Pump (OMNIPOD 5 G6 POD, GEN 5,) MISC     Insulin Lispro (HUMALOG KWIKPEN) 200 UNIT/ML soln     insulin pen needle (BD ENE U/F) 32G X 4 MM miscellaneous     lidocaine (LIDODERM) 5 % patch     losartan  (COZAAR) 25 MG tablet     metoprolol succinate ER (TOPROL XL) 200 MG 24 hr tablet     mycophenolic acid (GENERIC EQUIVALENT) 360 MG EC tablet     oxyCODONE (ROXICODONE) 5 MG tablet     patiromer (VELTASSA) 8.4 g packet     predniSONE (DELTASONE) 2.5 MG tablet     sildenafil (REVATIO) 20 MG tablet     tacrolimus (GENERIC EQUIVALENT) 1 MG capsule     thin (NO BRAND SPECIFIED) lancets     ursodiol (ACTIGALL) 500 MG tablet     Current Facility-Administered Medications   Medication     lidocaine (PF) (XYLOCAINE) 1 % injection 3 mL     lidocaine (PF) (XYLOCAINE) 1 % injection 3 mL     triamcinolone (KENALOG-40) injection 40 mg     triamcinolone (KENALOG-40) injection 40 mg     BP (!) 182/93   Pulse 70   Temp 98.2  F (36.8  C)   Wt 129.7 kg (286 lb)   SpO2 100%   BMI 38.79 kg/m      PHYSICAL EXAMINATION:    GENERAL:  He looks quite well.  HEENT EXAMINATION:  Shows no scleral icterus or temporal muscle wasting.  CHEST:  Clear.  ABDOMINAL EXAMINATION:  Shows no increase in girth.  No masses or tenderness to palpation are present.  His liver is 10 cm in span without left lobe enlargement.  No spleen tip is palpable.  EXTREMITY EXAMINATION:  Shows no edema.  SKIN EXAMINATION:  Shows no stigmata of chronic liver disease.  NEUROLOGIC EXAMINATION:  Nonfocal.    Recent Results (from the past 168 hour(s))   Protein  random urine    Collection Time: 01/30/23  7:50 AM   Result Value Ref Range    Total Protein Random Urine g/L 0.29 g/L    Total Protein Urine g/gr Creatinine 0.34 (H) 0.00 - 0.20 g/g Cr    Creatinine Urine mg/dL 86 mg/dL   Renal panel    Collection Time: 01/30/23  7:50 AM   Result Value Ref Range    Sodium 139 133 - 144 mmol/L    Potassium 5.3 3.4 - 5.3 mmol/L    Chloride 114 (H) 94 - 109 mmol/L    Carbon Dioxide (CO2) 22 20 - 32 mmol/L    Anion Gap 3 3 - 14 mmol/L    Urea Nitrogen 48 (H) 7 - 30 mg/dL    Creatinine 1.75 (H) 0.66 - 1.25 mg/dL    Calcium 8.9 8.5 - 10.1 mg/dL    Glucose 78 70 - 99 mg/dL    Albumin 3.8  3.4 - 5.0 g/dL    Phosphorus 4.0 2.5 - 4.5 mg/dL    GFR Estimate 45 (L) >60 mL/min/1.73m2   CBC with platelets    Collection Time: 01/30/23  7:50 AM   Result Value Ref Range    WBC Count 5.6 4.0 - 11.0 10e3/uL    RBC Count 3.67 (L) 4.40 - 5.90 10e6/uL    Hemoglobin 11.3 (L) 13.3 - 17.7 g/dL    Hematocrit 35.0 (L) 40.0 - 53.0 %    MCV 95 78 - 100 fL    MCH 30.8 26.5 - 33.0 pg    MCHC 32.3 31.5 - 36.5 g/dL    RDW 12.9 10.0 - 15.0 %    Platelet Count 108 (L) 150 - 450 10e3/uL   Tacrolimus by Tandem Mass Spectrometry    Collection Time: 01/30/23  7:50 AM   Result Value Ref Range    Tacrolimus by Tandem Mass Spectrometry 3.7 (L) 5.0 - 15.0 ug/L    Tacrolimus Last Dose Date 1/29/2023     Tacrolimus Last Dose Time  7:30 PM    Lipid Profile    Collection Time: 01/30/23  7:50 AM   Result Value Ref Range    Cholesterol 130 <200 mg/dL    Triglycerides 231 (H) <150 mg/dL    Direct Measure HDL 33 (L) >=40 mg/dL    LDL Cholesterol Calculated 51 <=100 mg/dL    Non HDL Cholesterol 97 <130 mg/dL    Patient Fasting > 8hrs? Yes    UA reflex to Microscopic    Collection Time: 01/30/23  7:50 AM   Result Value Ref Range    Color Urine Light Yellow Colorless, Straw, Light Yellow, Yellow    Appearance Urine Clear Clear    Glucose Urine Negative Negative mg/dL    Bilirubin Urine Negative Negative    Ketones Urine Negative Negative mg/dL    Specific Gravity Urine 1.013 1.003 - 1.035    Blood Urine Negative Negative    pH Urine 5.0 5.0 - 7.0    Protein Albumin Urine 20 (A) Negative mg/dL    Urobilinogen Urine Normal Normal, 2.0 mg/dL    Nitrite Urine Negative Negative    Leukocyte Esterase Urine Negative Negative    Bacteria Urine Few (A) None Seen /HPF    RBC Urine 1 <=2 /HPF    WBC Urine <1 <=5 /HPF    Squamous Epithelials Urine <1 <=1 /HPF    Mucus Urine Present (A) None Seen /LPF    Hyaline Casts Urine 7 (H) <=2 /LPF   Alkaline phosphatase    Collection Time: 01/30/23  7:50 AM   Result Value Ref Range    Alkaline Phosphatase 159 (H) 40 -  150 U/L   ALT    Collection Time: 01/30/23  7:50 AM   Result Value Ref Range    ALT 27 0 - 70 U/L   AST    Collection Time: 01/30/23  7:50 AM   Result Value Ref Range    AST 18 0 - 45 U/L   Bilirubin  total    Collection Time: 01/30/23  7:50 AM   Result Value Ref Range    Bilirubin Total 0.6 0.2 - 1.3 mg/dL   Bilirubin direct    Collection Time: 01/30/23  7:50 AM   Result Value Ref Range    Bilirubin Direct 0.2 0.0 - 0.2 mg/dL   Protein total    Collection Time: 01/30/23  7:50 AM   Result Value Ref Range    Protein Total 7.2 6.8 - 8.8 g/dL      IMPRESSION:  Mr. Bhagat is now 6 years status post liver transplantation.  He does have significant chronic kidney disease with proteinuria that is improved on his current dose of losartan.  He does also have hyperkalemia which makes it difficult to manage his blood pressure which is less than ideally controlled at this point in time.  He is seeing Jovanna Cazares in the near future I have just given her a head's up as to whether we can better manage his blood pressure without producing hyperkalemia.    Otherwise, his liver tests look quite good.  He is up to date with regard to vaccines and cancer screening.  He has seen the dermatologist recently.  He is up to date on his colonoscopy as well.  My plan will be to see the patient back in the clinic again in 6 months.    I did spend total of 30 minutes (on the date of the encounter), including 20 minutes of face-to-face clinic time including counseling.  The rest of the time was spent in documentation and review of records.     Thank you very much for allowing me to participate in the care of this patient.  If you have any questions regarding recommendations, please do not hesitate to contact me.         Toñito Camejo MD      Professor of Medicine  [employer]      Executive Medical Director, Solid Organ Transplant Program  [facility]

## 2023-01-31 NOTE — TELEPHONE ENCOUNTER
PA Initiation    Medication: Continuous Blood Gluc Sensor (DEXCOM G6 SENSOR) MISC   Insurance Company: [employer] - Phone [phone] Fax [phone]  Pharmacy Filling the Rx: [id number] - [place] - [address]  Filling Pharmacy Phone: [phone]  Filling Pharmacy Fax: [phone]  Start Date: 1/30/2023

## 2023-01-31 NOTE — TELEPHONE ENCOUNTER
Prior Authorization Approval    Authorization Effective Date: 1/31/2023  Authorization Expiration Date: 1/30/2024  Medication: Continuous Blood Gluc Sensor (DEXCOM G6 SENSOR) MISC--APPROVED  Approved Dose/Quantity:   Reference #:     Insurance Company: [employer] - Phone [phone] Fax [phone]  Expected CoPay:       CoPay Card Available:      Foundation Assistance Needed:    Which Pharmacy is filling the prescription (Not needed for infusion/clinic administered): [id number] - [place], [place] - [address]  Pharmacy Notified: Yes  Patient Notified: Yes **Instructed pharmacy to notify patient when script is ready to /ship.**

## 2023-01-31 NOTE — LETTER
1/31/2023         RE: Camacho Bhagat  [address]  [place] [address]        Dear Colleague,    Thank you for referring your patient, Camacho Bhagat, to the [facility] [place]. Please see a copy of my visit note below.    HISTORY OF PRESENT ILLNESS:  I had the pleasure of seeing Camacho Bhagat for followup in the Liver Transplant Clinic at the [facility] on 01/31/2023.  Mr. Bhagat returns for followup now almost 6 years status post liver transplantation.    He feels well at this visit.  He denies any abdominal pain, itching, skin rash, or fatigue.  He continues to work full time.  He denies any increased abdominal girth or lower extremity edema.  He has not had any gastrointestinal bleeding.    He denies any fevers or chills, cough or shortness of breath.  He denies any nausea or vomiting, diarrhea or constipation.  His appetite has been good and his weight for the most part has been stable.    He does report that his blood pressure has been under less than ideal control.  He does see Jovanna Foster in the Nephrology Clinic.    Current Outpatient Medications   Medication     alcohol swab prep pads     alpha-lipoic acid 600 MG capsule     amLODIPine (NORVASC) 10 MG tablet     blood glucose (NO BRAND SPECIFIED) lancets standard     blood glucose (NO BRAND SPECIFIED) test strip     blood glucose calibration (NO BRAND SPECIFIED) solution     Blood Glucose Monitoring Suppl (CONTOUR BLOOD GLUCOSE SYSTEM) w/Device KIT     cholecalciferol (VITAMIN D3) 1000 UNIT tablet     Continuous Blood Gluc Sensor (DEXCOM G6 SENSOR) MISC     Continuous Blood Gluc Transmit (DEXCOM G6 TRANSMITTER) MISC     cyclobenzaprine (FLEXERIL) 10 MG tablet     doxazosin (CARDURA) 2 MG tablet     fluticasone (FLONASE) 50 MCG/ACT nasal spray     furosemide (LASIX) 40 MG tablet     gabapentin (NEURONTIN) 300 MG capsule     Glucose Blood (BLOOD GLUCOSE TEST STRIPS) STRP     Insulin  Disposable Pump (OMNIPOD 5 G6 POD, GEN 5,) MISC     Insulin Disposable Pump (OMNIPOD 5 G6 POD, GEN 5,) MISC     Insulin Lispro (HUMALOG KWIKPEN) 200 UNIT/ML soln     insulin pen needle (BD ENE U/F) 32G X 4 MM miscellaneous     lidocaine (LIDODERM) 5 % patch     losartan (COZAAR) 25 MG tablet     metoprolol succinate ER (TOPROL XL) 200 MG 24 hr tablet     mycophenolic acid (GENERIC EQUIVALENT) 360 MG EC tablet     oxyCODONE (ROXICODONE) 5 MG tablet     patiromer (VELTASSA) 8.4 g packet     predniSONE (DELTASONE) 2.5 MG tablet     sildenafil (REVATIO) 20 MG tablet     tacrolimus (GENERIC EQUIVALENT) 1 MG capsule     thin (NO BRAND SPECIFIED) lancets     ursodiol (ACTIGALL) 500 MG tablet     Current Facility-Administered Medications   Medication     lidocaine (PF) (XYLOCAINE) 1 % injection 3 mL     lidocaine (PF) (XYLOCAINE) 1 % injection 3 mL     triamcinolone (KENALOG-40) injection 40 mg     triamcinolone (KENALOG-40) injection 40 mg     BP (!) 182/93   Pulse 70   Temp 98.2  F (36.8  C)   Wt 129.7 kg (286 lb)   SpO2 100%   BMI 38.79 kg/m      PHYSICAL EXAMINATION:    GENERAL:  He looks quite well.  HEENT EXAMINATION:  Shows no scleral icterus or temporal muscle wasting.  CHEST:  Clear.  ABDOMINAL EXAMINATION:  Shows no increase in girth.  No masses or tenderness to palpation are present.  His liver is 10 cm in span without left lobe enlargement.  No spleen tip is palpable.  EXTREMITY EXAMINATION:  Shows no edema.  SKIN EXAMINATION:  Shows no stigmata of chronic liver disease.  NEUROLOGIC EXAMINATION:  Nonfocal.    Recent Results (from the past 168 hour(s))   Protein  random urine    Collection Time: 01/30/23  7:50 AM   Result Value Ref Range    Total Protein Random Urine g/L 0.29 g/L    Total Protein Urine g/gr Creatinine 0.34 (H) 0.00 - 0.20 g/g Cr    Creatinine Urine mg/dL 86 mg/dL   Renal panel    Collection Time: 01/30/23  7:50 AM   Result Value Ref Range    Sodium 139 133 - 144 mmol/L    Potassium 5.3 3.4  - 5.3 mmol/L    Chloride 114 (H) 94 - 109 mmol/L    Carbon Dioxide (CO2) 22 20 - 32 mmol/L    Anion Gap 3 3 - 14 mmol/L    Urea Nitrogen 48 (H) 7 - 30 mg/dL    Creatinine 1.75 (H) 0.66 - 1.25 mg/dL    Calcium 8.9 8.5 - 10.1 mg/dL    Glucose 78 70 - 99 mg/dL    Albumin 3.8 3.4 - 5.0 g/dL    Phosphorus 4.0 2.5 - 4.5 mg/dL    GFR Estimate 45 (L) >60 mL/min/1.73m2   CBC with platelets    Collection Time: 01/30/23  7:50 AM   Result Value Ref Range    WBC Count 5.6 4.0 - 11.0 10e3/uL    RBC Count 3.67 (L) 4.40 - 5.90 10e6/uL    Hemoglobin 11.3 (L) 13.3 - 17.7 g/dL    Hematocrit 35.0 (L) 40.0 - 53.0 %    MCV 95 78 - 100 fL    MCH 30.8 26.5 - 33.0 pg    MCHC 32.3 31.5 - 36.5 g/dL    RDW 12.9 10.0 - 15.0 %    Platelet Count 108 (L) 150 - 450 10e3/uL   Tacrolimus by Tandem Mass Spectrometry    Collection Time: 01/30/23  7:50 AM   Result Value Ref Range    Tacrolimus by Tandem Mass Spectrometry 3.7 (L) 5.0 - 15.0 ug/L    Tacrolimus Last Dose Date 1/29/2023     Tacrolimus Last Dose Time  7:30 PM    Lipid Profile    Collection Time: 01/30/23  7:50 AM   Result Value Ref Range    Cholesterol 130 <200 mg/dL    Triglycerides 231 (H) <150 mg/dL    Direct Measure HDL 33 (L) >=40 mg/dL    LDL Cholesterol Calculated 51 <=100 mg/dL    Non HDL Cholesterol 97 <130 mg/dL    Patient Fasting > 8hrs? Yes    UA reflex to Microscopic    Collection Time: 01/30/23  7:50 AM   Result Value Ref Range    Color Urine Light Yellow Colorless, Straw, Light Yellow, Yellow    Appearance Urine Clear Clear    Glucose Urine Negative Negative mg/dL    Bilirubin Urine Negative Negative    Ketones Urine Negative Negative mg/dL    Specific Gravity Urine 1.013 1.003 - 1.035    Blood Urine Negative Negative    pH Urine 5.0 5.0 - 7.0    Protein Albumin Urine 20 (A) Negative mg/dL    Urobilinogen Urine Normal Normal, 2.0 mg/dL    Nitrite Urine Negative Negative    Leukocyte Esterase Urine Negative Negative    Bacteria Urine Few (A) None Seen /HPF    RBC Urine 1 <=2  /HPF    WBC Urine <1 <=5 /HPF    Squamous Epithelials Urine <1 <=1 /HPF    Mucus Urine Present (A) None Seen /LPF    Hyaline Casts Urine 7 (H) <=2 /LPF   Alkaline phosphatase    Collection Time: 01/30/23  7:50 AM   Result Value Ref Range    Alkaline Phosphatase 159 (H) 40 - 150 U/L   ALT    Collection Time: 01/30/23  7:50 AM   Result Value Ref Range    ALT 27 0 - 70 U/L   AST    Collection Time: 01/30/23  7:50 AM   Result Value Ref Range    AST 18 0 - 45 U/L   Bilirubin  total    Collection Time: 01/30/23  7:50 AM   Result Value Ref Range    Bilirubin Total 0.6 0.2 - 1.3 mg/dL   Bilirubin direct    Collection Time: 01/30/23  7:50 AM   Result Value Ref Range    Bilirubin Direct 0.2 0.0 - 0.2 mg/dL   Protein total    Collection Time: 01/30/23  7:50 AM   Result Value Ref Range    Protein Total 7.2 6.8 - 8.8 g/dL      IMPRESSION:  Mr. Bhagat is now 6 years status post liver transplantation.  He does have significant chronic kidney disease with proteinuria that is improved on his current dose of losartan.  He does also have hyperkalemia which makes it difficult to manage his blood pressure which is less than ideally controlled at this point in time.  He is seeing Jovanna Cazares in the near future I have just given her a head's up as to whether we can better manage his blood pressure without producing hyperkalemia.    Otherwise, his liver tests look quite good.  He is up to date with regard to vaccines and cancer screening.  He has seen the dermatologist recently.  He is up to date on his colonoscopy as well.  My plan will be to see the patient back in the clinic again in 6 months.    I did spend total of 30 minutes (on the date of the encounter), including 20 minutes of face-to-face clinic time including counseling.  The rest of the time was spent in documentation and review of records.     Thank you very much for allowing me to participate in the care of this patient.  If you have any questions regarding recommendations,  please do not hesitate to contact me.         Toñito Camejo MD      Professor of Medicine  [employer]      Executive Medical Director, Solid Organ Transplant Program  [facility]

## 2023-01-31 NOTE — NURSING NOTE
Chief Complaint   Patient presents with     RECHECK     Return visit.     Blood pressure (!) 182/93, pulse 70, temperature 98.2  F (36.8  C), weight 129.7 kg (286 lb), SpO2 100 %.    ELISA FARAH

## 2023-02-14 ENCOUNTER — PATIENT OUTREACH (OUTPATIENT)
Dept: NEPHROLOGY | Facility: CLINIC | Age: 58
End: 2023-02-14
Payer: COMMERCIAL

## 2023-02-14 ENCOUNTER — TELEPHONE (OUTPATIENT)
Dept: TRANSPLANT | Facility: CLINIC | Age: 58
End: 2023-02-14
Payer: COMMERCIAL

## 2023-02-14 DIAGNOSIS — I10 BENIGN ESSENTIAL HYPERTENSION: ICD-10-CM

## 2023-02-14 DIAGNOSIS — K52.9 CHRONIC DIARRHEA OF UNKNOWN ORIGIN: ICD-10-CM

## 2023-02-14 DIAGNOSIS — Z90.49 HISTORY OF HEMICOLECTOMY: ICD-10-CM

## 2023-02-14 DIAGNOSIS — Z94.4 LIVER REPLACED BY TRANSPLANT (H): Primary | ICD-10-CM

## 2023-02-14 NOTE — PROGRESS NOTES
Leighton Styles, can we increase his Doxazosin to 4 mg HS   Ask him to send in B/ps in one week after the increase   Thanks   Jovanna   ----------------------------------------------------------------------------    Contacting patient to see if how blood pressures have been since increasing his Doxazosin to 4 mg at bed. Sent [employer]em[employer] message    Janel Reyes RN, BSN   Nephrology RN Care Coordinator   [facility]

## 2023-02-14 NOTE — TELEPHONE ENCOUNTER
Camacho sent a [employer] message to let me know     Ever since his hemicolectomy about 5 years ago Camacho has experienced diarrhea. He said it has increased in the last couple of months and has become more frequent and more watery. At worst he is going 6-10 times a day. He is experiencing abd cramping as well. Denies fevers. Colonoscopy a month ago was OK per Camacho. Weight is stable. He is trying to increase his fluid intake to ensure adequate hydration.     Using Imodium as needed with some relief.

## 2023-02-15 ENCOUNTER — HOSPITAL ENCOUNTER (OUTPATIENT)
Dept: GENERAL RADIOLOGY | Facility: CLINIC | Age: 58
Discharge: HOME | End: 2023-02-15
Attending: PREVENTIVE MEDICINE | Admitting: PREVENTIVE MEDICINE
Payer: COMMERCIAL

## 2023-02-15 VITALS — HEART RATE: 69 BPM | DIASTOLIC BLOOD PRESSURE: 74 MMHG | OXYGEN SATURATION: 100 % | SYSTOLIC BLOOD PRESSURE: 158 MMHG

## 2023-02-15 DIAGNOSIS — M51.369 DDD (DEGENERATIVE DISC DISEASE), LUMBAR: ICD-10-CM

## 2023-02-15 DIAGNOSIS — M51.16 LUMBAR DISC HERNIATION WITH RADICULOPATHY: ICD-10-CM

## 2023-02-15 PROCEDURE — 255N000002 HC RX 255 OP 636: Performed by: PREVENTIVE MEDICINE

## 2023-02-15 PROCEDURE — 250N000011 HC RX IP 250 OP 636

## 2023-02-15 PROCEDURE — 62323 NJX INTERLAMINAR LMBR/SAC: CPT

## 2023-02-15 PROCEDURE — 250N000009 HC RX 250

## 2023-02-15 RX ORDER — BETAMETHASONE SODIUM PHOSPHATE AND BETAMETHASONE ACETATE 3; 3 MG/ML; MG/ML
18 INJECTION, SUSPENSION INTRA-ARTICULAR; INTRALESIONAL; INTRAMUSCULAR; SOFT TISSUE ONCE
Status: COMPLETED | OUTPATIENT
Start: 2023-02-15 | End: 2023-02-15

## 2023-02-15 RX ORDER — IOPAMIDOL 408 MG/ML
10 INJECTION, SOLUTION INTRATHECAL ONCE
Status: COMPLETED | OUTPATIENT
Start: 2023-02-15 | End: 2023-02-15

## 2023-02-15 RX ORDER — LIDOCAINE HYDROCHLORIDE 10 MG/ML
INJECTION, SOLUTION EPIDURAL; INFILTRATION; INTRACAUDAL; PERINEURAL
Status: COMPLETED
Start: 2023-02-15 | End: 2023-02-15

## 2023-02-15 RX ORDER — BETAMETHASONE SODIUM PHOSPHATE AND BETAMETHASONE ACETATE 3; 3 MG/ML; MG/ML
INJECTION, SUSPENSION INTRA-ARTICULAR; INTRALESIONAL; INTRAMUSCULAR; SOFT TISSUE
Status: COMPLETED
Start: 2023-02-15 | End: 2023-02-15

## 2023-02-15 RX ADMIN — LIDOCAINE HYDROCHLORIDE 60 MG: 10 INJECTION, SOLUTION EPIDURAL; INFILTRATION; INTRACAUDAL; PERINEURAL at 15:32

## 2023-02-15 RX ADMIN — BETAMETHASONE SODIUM PHOSPHATE AND BETAMETHASONE ACETATE 18 MG: 3; 3 INJECTION, SUSPENSION INTRA-ARTICULAR; INTRALESIONAL; INTRAMUSCULAR; SOFT TISSUE at 15:32

## 2023-02-15 RX ADMIN — IOPAMIDOL 1.5 ML: 408 INJECTION, SOLUTION INTRATHECAL at 15:33

## 2023-02-15 RX ADMIN — BETAMETHASONE SODIUM PHOSPHATE AND BETAMETHASONE ACETATE 18 MG: 3; 3 INJECTION, SUSPENSION INTRA-ARTICULAR; INTRALESIONAL; INTRAMUSCULAR at 15:32

## 2023-02-15 NOTE — PROGRESS NOTES
Pt was in Radiology today for a L4-5 TLESI. Pt tolerated ortho procedure well. Pre procedure pain was 7-8/10 post procedure pain level 5/10.Procedure was completed by AVE GON[facility]. There were no complications during this procedure. Pt verbalized understanding of written and verbal instructions and left department in stable and satisfactory condition with wife. There is no evidence of bleeding or any other complications upon discharge.

## 2023-02-15 NOTE — PROCEDURES
[employer]    Procedure: L4-L5 ILESI    Date/Time: 2/15/2023 3:27 PM  Performed by: Nitish Keyes PA-C  Authorized by: Nitish Keyes PA-C       UNIVERSAL PROTOCOL   Site Marked: Yes  Prior Images Obtained and Reviewed:  Yes  Required items: Required blood products, implants, devices and special equipment available    Patient identity confirmed:  Verbally with patient  Patient was reevaluated immediately before administering moderate or deep sedation or anesthesia  Confirmation Checklist:  Patient's identity using two indicators, relevant allergies, procedure was appropriate and matched the consent or emergent situation and correct equipment/implants were available  Time out: Immediately prior to the procedure a time out was called    Universal Protocol: the Joint Commission Universal Protocol was followed    Preparation: Patient was prepped and draped in usual sterile fashion       ANESTHESIA    Local Anesthetic: Lidocaine 1% without epinephrine      SEDATION    Patient Sedated: No    See dictated procedure note for full details.    PROCEDURE    Patient Tolerance:  Patient tolerated the procedure well with no immediate complications  Length of time physician/provider present for 1:1 monitoring during sedation: 0

## 2023-02-16 ENCOUNTER — TELEPHONE (OUTPATIENT)
Dept: TRANSPLANT | Facility: CLINIC | Age: 58
End: 2023-02-16
Payer: COMMERCIAL

## 2023-02-16 DIAGNOSIS — Z94.4 LIVER REPLACED BY TRANSPLANT (H): ICD-10-CM

## 2023-02-16 DIAGNOSIS — K52.9 CHRONIC DIARRHEA OF UNKNOWN ORIGIN: ICD-10-CM

## 2023-02-16 DIAGNOSIS — Z90.49 HISTORY OF HEMICOLECTOMY: Primary | ICD-10-CM

## 2023-02-16 NOTE — TELEPHONE ENCOUNTER
Received the following message from CARLOS Velásquez (covering for Dr. Camejo) in response to previous message regarding Camacho' ongoing diarrhea.     Yes okay to do C-diff, enteric panel and CMV if it hasn't been checked lately.     Can titrate imodium up to 16 mg daily.   Also adding metamucil (2-3 teaspoons - then more as tolerated) can help bulk up stools.       Reviewed with Camacho who verbalized understanding. GI consult has already been placed. Lab orders entered and he will  at his local [facility] lab. Camacho said he does remember speaking to Dr. Camejo about this previously and he had mentioned metamucil but Camacho forgot what Dr. Camejo said and so he never started. He is staying well hydrated and will update me with any news.

## 2023-02-22 RX ORDER — DOXAZOSIN 2 MG/1
6 TABLET ORAL AT BEDTIME
Qty: 270 TABLET | Refills: 3 | Status: SHIPPED | OUTPATIENT
Start: 2023-02-22 | End: 2023-08-11

## 2023-02-22 NOTE — PROGRESS NOTES
Per provider, to check in on blood pressures.   Sent Pitzi message.  -----------------------------------------------------------------------  Cinda Cazares NP Botts, Brittany, RN  Phone Number: [phone]     Let's increase his Doxazosin to 6 mg HS. Please update his med list.   Have him check in next week   Thanks   ------------------------------------------------------------------------  Sent [employer] message and med list updated     Janel Reyes RN, BSN   Nephrology RN Care Coordinator   [facility]

## 2023-02-28 ENCOUNTER — TELEPHONE (OUTPATIENT)
Dept: TRANSPLANT | Facility: CLINIC | Age: 58
End: 2023-02-28
Payer: COMMERCIAL

## 2023-02-28 NOTE — TELEPHONE ENCOUNTER
Annual chart review completed and Camacho is up to date on all post transplant recommended visits/labs.

## 2023-03-07 NOTE — TELEPHONE ENCOUNTER
REFERRAL INFORMATION:    Referring Provider:  Dr. Toñito Camejo    Referring Clinic:  [facility] - Hepatoligist    Reason for Visit/Diagnosis: Diarrhea     FUTURE VISIT INFORMATION:    Appointment Date: 04-18-23    Appointment Time: @ 9:20am     NOTES STATUS DETAILS   OFFICE NOTE from Referring Provider Internal 02-14-23, 06-01-21, 06-03-20 Dr. Toñito Camejo   OFFICE NOTE from Other Specialist Internal 06-06-22, 12-27-21 Dr. Shay Kirkpatrick  05-20-22 Bennett Ezra Goltz  05-02-22 Dr. Alisa Porter [facility] DISCHARGE SUMMARY/  ED VISITS N/A    OPERATIVE REPORT N/A    MEDICATION LIST Internal         ENDOSCOPY  Internal 05-22-18   COLONOSCOPY Internal 10-24-22, 07-21-17, 08-04-16   ERCP Internal 05-22-18   EUS N/A    STOOL TESTING Internal    PERTINENT LABS Internal    PATHOLOGY REPORTS (RELATED) Internal 10-24-22   IMAGING (CT, MRI, EGD, MRCP, Small Bowel Follow Through/SBT, MR/CT Enterography) Internal EGD: 08-27-17, 08-04-16  CT abd: 07-04-17

## 2023-03-09 PROCEDURE — 87506 IADNA-DNA/RNA PROBE TQ 6-11: CPT | Performed by: PHYSICIAN ASSISTANT

## 2023-03-09 PROCEDURE — 87493 C DIFF AMPLIFIED PROBE: CPT | Performed by: PHYSICIAN ASSISTANT

## 2023-03-31 ENCOUNTER — TELEPHONE (OUTPATIENT)
Dept: ENDOCRINOLOGY | Facility: CLINIC | Age: 58
End: 2023-03-31
Payer: COMMERCIAL

## 2023-03-31 NOTE — TELEPHONE ENCOUNTER
Patient call:     Appointment type: Return endo video  Provider: Brett  Return date: reschedule 5/8 appt   Speciality phone number: [phone]  Additional appointment(s) needed:   Additional notes: LVM and sent Myc to reschedule 5/8 appt Dr. DIAZ on [facility] emergency 3/31 [facility]

## 2023-04-01 ENCOUNTER — HEALTH MAINTENANCE LETTER (OUTPATIENT)
Age: 58
End: 2023-04-01

## 2023-04-11 ENCOUNTER — MYC MEDICAL ADVICE (OUTPATIENT)
Dept: INTERNAL MEDICINE | Facility: CLINIC | Age: 58
End: 2023-04-11
Payer: COMMERCIAL

## 2023-04-11 DIAGNOSIS — M79.672 PAIN IN BOTH FEET: Primary | ICD-10-CM

## 2023-04-11 DIAGNOSIS — M15.0 PRIMARY OSTEOARTHRITIS INVOLVING MULTIPLE JOINTS: ICD-10-CM

## 2023-04-11 DIAGNOSIS — M79.671 PAIN IN BOTH FEET: Primary | ICD-10-CM

## 2023-04-12 RX ORDER — OXYCODONE HYDROCHLORIDE 5 MG/1
5 TABLET ORAL 4 TIMES DAILY PRN
Qty: 30 TABLET | Refills: 0 | Status: SHIPPED | OUTPATIENT
Start: 2023-04-12 | End: 2023-04-13

## 2023-04-12 NOTE — TELEPHONE ENCOUNTER
Per  data, pt last had oxyCODONE (ROXICODONE) 5 MG tablet refilled for an 8 day supply on 1/13/23. Medication is due for refill. Pt was last seen by provider on 12/5/22. Medication routed to provider for refill approval.   Podiatry referral pended to provider for approval.     WES SALCEDO RN on 4/12/2023 at 10:01 AM

## 2023-04-13 RX ORDER — OXYCODONE HYDROCHLORIDE 5 MG/1
5 TABLET ORAL 4 TIMES DAILY PRN
Qty: 30 TABLET | Refills: 0 | Status: SHIPPED | OUTPATIENT
Start: 2023-04-13 | End: 2024-02-12

## 2023-04-13 NOTE — PROGRESS NOTES
07/11/17 1600   Quick Adds   Type of Visit Initial Occupational Therapy Evaluation   Living Environment   Lives With alone   Living Arrangements house   Home Accessibility tub/shower is not walk in   Number of Stairs to Enter Home 4   Number of Stairs Within Home 0   Stair Railings at Home outside, present at both sides;inside, present on right side   Transportation Available family or friend will provide;car  (pt drives at baseline)   Living Environment Comment per pt's mom, he has been living at his mother's house which is more accessible than his own. Pt's mother provided living environment and PLOF information.    Self-Care   Dominant Hand right   Usual Activity Tolerance good   Current Activity Tolerance poor   Regular Exercise yes   Activity/Exercise Type walking   Exercise Amount/Frequency daily   Equipment Currently Used at Home walker, standard;cane, straight;shower chair  (has but does not currently use)   Activity/Exercise/Self-Care Comment Pt had returned to independent to mod ind post liver transplant this spring. Pt's mother reports he has been walking his dog daily, [place] goyal. He does not use any equipment currently. He has been managing his own medications and finances as well.    Functional Level Prior   Ambulation 0-->independent   Transferring 0-->independent   Toileting 0-->independent   Bathing 0-->independent   Dressing 0-->independent   Eating 0-->independent   Communication 0-->understands/communicates without difficulty   Swallowing 0-->swallows foods/liquids without difficulty   Cognition 0 - no cognition issues reported   Fall history within last six months no   Which of the above functional risks had a recent onset or change? ambulation;toileting;transferring;bathing;dressing;eating;swallowing;cognition;communication/speech   General Information   Onset of Illness/Injury or Date of Surgery - Date 07/04/17   Referring Physician Roxi Santoyo MD   Patient/Family Goals Statement Pt  "does not state a specific goal but his mother mentions he will want to get home to his dog   Additional Occupational Profile Info/Pertinent History of Current Problem Per chart review: \"51 year old man s/p liver transplant March 2017 after [place] admitted on 7/4 for abdominal pain and fever found to have neutropenic colitis s/p Exploratory laparotomy due to concern for perforation, viable hepatic flexure; s/p Abdominal washout and closure\"   Precautions/Limitations fall precautions;abdominal precautions   General Observations vented via ET tube, R chest tube to water seal, vaqzuez catheter, L brachial art line   Cognitive Status Examination   Orientation not oriented to person, place or time  (Not consistent with yes/no questions)   Level of Consciousness alert;confused   Able to Follow Commands moderate impairment;success, 1-step commands  (25% success with single step commands)   Integumentary/Edema   Integumentary/Edema Comments scrotal edema present causing discomfort with movement   Range of Motion (ROM)   ROM Comment BUE WFL PROM   Strength   Strength Comments Exam limited by cognition. Upon observation pt has ~3/5 strength in BUE   Hand Strength   Hand Strength Comments weak grasp bilaterally   Coordination   Upper Extremity Coordination Left UE impaired;Right UE impaired   Fine Motor Coordination limited by weakness   Mobility   Bed Mobility Bed mobility skill: Rolling/Turning   Bed Mobility Skill: Rolling/Turning   Level of [place] - Bed Mobility Skill Rolling Turning dependent (less than 25% patients effort)   Physical Assist/Nonphysical Assist 2 persons   Transfer Skill: Bed to Chair/Chair to Bed   Level of [place]: Bed to Chair dependent (less than 25% patients effort)   Physical Assist/Nonphysical Assist: Bed to Chair 2 persons  (lift)   Balance   Balance Quick Add Sitting balance: static;Sitting balance: dynamic;Standing balance: static;Standing balance: dynamic   Sitting Balance: Static poor " "balance   Sitting Balance: Dynamic poor balance   Lower Body Dressing   Level of [place]: Dress Lower Body maximum assist (25% patients effort)  (to don socks)   Instrumental Activities of Daily Living (IADL)   Previous Responsibilities meal prep;housekeeping;laundry;shopping;yardwork;medication management;finances;driving  (pet care)   IADL Comments Pt's mother reports pt returned to [place] with IADL post liver transplant.    Activities of Daily Living Analysis   Impairments Contributing to Impaired Activities of Daily Living strength decreased;cognition impaired;post surgical precautions;balance impaired   General Therapy Interventions   Planned Therapy Interventions ADL retraining;cognition;transfer training;strengthening;progressive activity/exercise   Clinical Impression   Criteria for Skilled Therapeutic Interventions Met yes, treatment indicated   OT Diagnosis Decreased I/ADL independence   Influenced by the following impairments strength decreased;cognition impaired;post surgical precautions;balance impaired   Assessment of Occupational Performance 3-5 Performance Deficits   Identified Performance Deficits strength decreased;cognition impaired;post surgical precautions;balance impaired affecting occupational performance with basic g/h, dressing, functional transfers.    Clinical Decision Making (Complexity) Low complexity   Therapy Frequency 3 times/wk   Predicted Duration of Therapy Intervention (days/wks) 2 weeks   Anticipated Equipment Needs at Discharge (will continue to assess)   Anticipated Discharge Disposition Transitional Care Facility   Risks and Benefits of Treatment have been explained. Yes   Patient, Family & other staff in agreement with plan of care Other (comments)  (pt unable to express, mother accepting)   [facility] AM-PAC TM \"6 Clicks\"   2016, Trustees of [facility], under license to [employer].  All rights reserved.   6 Clicks Short Forms Daily Activity " "Inpatient Short Form   [facility] AM-PAC  \"6 Clicks\" Daily Activity Inpatient Short Form   1. Putting on and taking off regular lower body clothing? 2 - A Lot   2. Bathing (including washing, rinsing, drying)? 1 - Total   3. Toileting, which includes using toilet, bedpan or urinal? 1 - Total   4. Putting on and taking off regular upper body clothing? 1 - Total   5. Taking care of personal grooming such as brushing teeth? 1 - Total   6. Eating meals? 1 - Total   Daily Activity Raw Score (Score out of 24.Lower scores equate to lower levels of function) 7     " Elliptical Excision Additional Text (Leave Blank If You Do Not Want): The margin was drawn around the clinically apparent lesion.  An elliptical shape was then drawn on the skin incorporating the lesion and margins.  Incisions were then made along these lines to the appropriate tissue plane and the lesion was extirpated.

## 2023-04-17 ENCOUNTER — OFFICE VISIT (OUTPATIENT)
Dept: PODIATRY | Facility: CLINIC | Age: 58
End: 2023-04-17
Payer: COMMERCIAL

## 2023-04-17 VITALS — DIASTOLIC BLOOD PRESSURE: 72 MMHG | SYSTOLIC BLOOD PRESSURE: 142 MMHG | WEIGHT: 290 LBS | BODY MASS INDEX: 39.33 KG/M2

## 2023-04-17 DIAGNOSIS — M79.671 PAIN IN BOTH FEET: ICD-10-CM

## 2023-04-17 DIAGNOSIS — G62.9 PERIPHERAL POLYNEUROPATHY: Primary | ICD-10-CM

## 2023-04-17 DIAGNOSIS — M79.672 PAIN IN BOTH FEET: ICD-10-CM

## 2023-04-17 PROCEDURE — 99214 OFFICE O/P EST MOD 30 MIN: CPT | Performed by: PODIATRIST

## 2023-04-17 NOTE — LETTER
4/17/2023         RE: Camacho Bhagat  [address] [facility] [address]        Dear Colleague,    Thank you for referring your patient, Camacho Bhagat, to the [facility] PODI[facility]RY. Please see a copy of my visit note below.    Foot & Ankle Surgery   April 17, 2023    S:  Pt is seen today for evaluation of pronounced bilateral lower extremity pain, greater than 6 months.  The patient has he has chronic low back issues with pain that radiates down his feet.  He had a lumbar spine injection in February that helped with back and radiating pain, but the pain in the feet persists.  The patient is also diabetic and states he has diabetic neuropathy.  He indicates numbness, tingling and burning in his feet.  He cannot take Tylenol because of a previous liver transplant, and cannot have NSAIDs.  He waits until the pain is severe and then he takes an oxycodone..  He mentions ankle pain as well.     Vitals:    04/17/23 1303   BP: (!) 142/72   Weight: 131.5 kg (290 lb)   '      ROS - Pos for CC.  Patient denies current nausea, vomiting, chills, fevers, belly pain, calf pain, chest pain or SOB.  Complete remainder of ROS it otherwise neg.      PE:  Gen:   No apparent distress  Eye:    Visual scanning without deficit  Ear:    Response to auditory stimuli wnl  Lung:    Non-labored breathing on RA noted  Abd:    NTND per patient report  Lymph:   Lower extremity swelling bilateral  Vasc:    Pulses palpable, CFT minimally delayed  Neuro:    Light touch sensation diminished with painful paresthesias  Derm:    Neg for nodules, lesions or ulcerations  MSK:   The patient has diffuse pain throughout bilateral lower extremities including the fifth metatarsal base, peroneal tendons, circumferentially around the ankle, dorsal foot, plantar arch and dorsal/plantar forefoot  Calf:    Neg for redness, swelling or tenderness      Assessment:  57 year old male with diffuse bilateral lower extremity pain in setting  of advanced lumbosacral spine pathology status post previous injections as well as diabetic peripheral neuropathy      Medical Decision Making/Plan:  Discussed etiologies, anatomy and options  1.  Diffuse bilateral lower extremity pain in setting advanced lumbosacral spine pathology status post previous back injections as well as diabetic peripheral neuropathy  -The patient has pain essentially everywhere throughout bilateral lower extremity.  -Orthotic lab for diabetic shoes and orthotics.  He bought new shoes recently with inserts and states he had about 7 months of relief.  Hopefully custom orthotics will help to alleviate pain further.  -The patient is limited on what oral medications he can take.  Because of the severe pain levels, as well as lumbar spine and peripheral neuropathy involvement, I placed a pain management referral for further discussion of treatment options    Diabetic foot exam: normal DP and PT pulses and reduced sensation at bilateral foot with painful paresthesias      Follow up:  prn or sooner with acute issues           Ascencion Gorman DPM [facility] FACFAOM  Podiatric Foot & Ankle Surgeon  [facility]  [phone]    Disclaimer: This note consists of symbols derived from keyboarding, dictation and/or voice recognition software. As a result, there may be errors in the script that have gone undetected. Please consider this when interpreting information found in this chart.              Again, thank you for allowing me to participate in the care of your patient.        Sincerely,        Ascencion Gorman DPM, AYO

## 2023-04-17 NOTE — OR NURSING
Colonoscopy completed with biopsies and pt tolerated well with conscious sedation and on 3L nc oxygen. Pt had large amount of liquid stool present.  Report called to 7A RN and trasport here to take pt back to dept.    
Pt tolerated EGD and Ileoscopy through stoma well on 4L NC.  No interventions. Report called to floor RN. Transport ordered.  
Pt tolerated EGD and Ileoscopy through stoma well on 4L NC. Arousable throughout. VSS throughout. No interventions. Report called to floor RN. Transport ordered.  
Removed pouch for procedure and replaced before sending back to floor.  
Detail Level: Detailed
Size Of Lesion In Cm (Optional): 0

## 2023-04-17 NOTE — PROGRESS NOTES
Foot & Ankle Surgery   April 17, 2023    S:  Pt is seen today for evaluation of pronounced bilateral lower extremity pain, greater than 6 months.  The patient has he has chronic low back issues with pain that radiates down his feet.  He had a lumbar spine injection in February that helped with back and radiating pain, but the pain in the feet persists.  The patient is also diabetic and states he has diabetic neuropathy.  He indicates numbness, tingling and burning in his feet.  He cannot take Tylenol because of a previous liver transplant, and cannot have NSAIDs.  He waits until the pain is severe and then he takes an oxycodone..  He mentions ankle pain as well.     Vitals:    04/17/23 1303   BP: (!) 142/72   Weight: 131.5 kg (290 lb)   '      ROS - Pos for CC.  Patient denies current nausea, vomiting, chills, fevers, belly pain, calf pain, chest pain or SOB.  Complete remainder of ROS it otherwise neg.      PE:  Gen:   No apparent distress  Eye:    Visual scanning without deficit  Ear:    Response to auditory stimuli wnl  Lung:    Non-labored breathing on RA noted  Abd:    NTND per patient report  Lymph:   Lower extremity swelling bilateral  Vasc:    Pulses palpable, CFT minimally delayed  Neuro:    Light touch sensation diminished with painful paresthesias  Derm:    Neg for nodules, lesions or ulcerations  MSK:   The patient has diffuse pain throughout bilateral lower extremities including the fifth metatarsal base, peroneal tendons, circumferentially around the ankle, dorsal foot, plantar arch and dorsal/plantar forefoot  Calf:    Neg for redness, swelling or tenderness      Assessment:  57 year old male with diffuse bilateral lower extremity pain in setting of advanced lumbosacral spine pathology status post previous injections as well as diabetic peripheral neuropathy      Medical Decision Making/Plan:  Discussed etiologies, anatomy and options  1.  Diffuse bilateral lower extremity pain in setting advanced  lumbosacral spine pathology status post previous back injections as well as diabetic peripheral neuropathy  -The patient has pain essentially everywhere throughout bilateral lower extremity.  -Orthotic lab for diabetic shoes and orthotics.  He bought new shoes recently with inserts and states he had about 7 months of relief.  Hopefully custom orthotics will help to alleviate pain further.  -The patient is limited on what oral medications he can take.  Because of the severe pain levels, as well as lumbar spine and peripheral neuropathy involvement, I placed a pain management referral for further discussion of treatment options    Diabetic foot exam: normal DP and PT pulses and reduced sensation at bilateral foot with painful paresthesias      Follow up:  prn or sooner with acute issues           Ascencion Gorman DPM FACFAS FACFAOM  Podiatric Foot & Ankle Surgeon  [facility]  [phone]    Disclaimer: This note consists of symbols derived from keyboarding, dictation and/or voice recognition software. As a result, there may be errors in the script that have gone undetected. Please consider this when interpreting information found in this chart.

## 2023-04-17 NOTE — PATIENT INSTRUCTIONS
Thank you for choosing [facility] Podiatry / Foot & Ankle Surgery!    DR. GAONA'S CLINIC LOCATIONS:     [facility] (Friday) TRIAGE LINE: [phone]   [address] [facility]  APPOINTMENTS: [phone]   [place] [address] RADIOLOGY: [phone]    PHYSICAL THERAPY: [phone]    SET UP SURGERY: [phone]   [place] (Mon-Tues AM-Thurs) BILLING QUESTIONS: [phone]   [address] FAX: [phone]   [place] [address]      Follow up as needed     [facility] LOCATIONS  [facility]  [address]  [place] [address]  Phone: [phone]  Fax: [phone] [facility]  [address]  [place] [address]  Phone: [phone]   Fax: [phone]   [facility]  [address]  [place] [address]  Phone: [phone]  Fax: [phone] [facility]  [address]  [place] [address]  Phone: [phone]   Fax: [phone]   [facility]   [address]  [place] [address]  Phone: [phone]  Fax: [phone] * Please call any location listed to make an appointment for a casting/fitting. Your referral was sent to their central office and they will all have the order on file.

## 2023-04-18 ENCOUNTER — PRE VISIT (OUTPATIENT)
Dept: GASTROENTEROLOGY | Facility: CLINIC | Age: 58
End: 2023-04-18

## 2023-04-18 ENCOUNTER — OFFICE VISIT (OUTPATIENT)
Dept: GASTROENTEROLOGY | Facility: CLINIC | Age: 58
End: 2023-04-18
Payer: COMMERCIAL

## 2023-04-18 VITALS
SYSTOLIC BLOOD PRESSURE: 150 MMHG | HEIGHT: 72 IN | BODY MASS INDEX: 38.87 KG/M2 | WEIGHT: 287 LBS | DIASTOLIC BLOOD PRESSURE: 70 MMHG | HEART RATE: 72 BPM | OXYGEN SATURATION: 99 %

## 2023-04-18 DIAGNOSIS — K52.9 CHRONIC DIARRHEA OF UNKNOWN ORIGIN: ICD-10-CM

## 2023-04-18 DIAGNOSIS — Z94.4 LIVER REPLACED BY TRANSPLANT (H): ICD-10-CM

## 2023-04-18 DIAGNOSIS — Z90.49 HISTORY OF HEMICOLECTOMY: ICD-10-CM

## 2023-04-18 PROCEDURE — 99214 OFFICE O/P EST MOD 30 MIN: CPT | Performed by: INTERNAL MEDICINE

## 2023-04-18 ASSESSMENT — PAIN SCALES - GENERAL: PAINLEVEL: NO PAIN (0)

## 2023-04-18 NOTE — LETTER
4/18/2023         RE: Camacho Bhagat  [address] [facility] [address]        Dear Colleague,    Thank you for referring your patient, Camacho Bhagat, to the [facility] [place]. Please see a copy of my visit note below.    GI CLINIC VISIT    CC/REFERRING MD:  Dr. Toñito Camejo, hepatology  REASON FOR CONSULTATION:   Mr. Bhagat is a 57 year old male who I was asked to see in consultation at the request of Dr. Toñito Camejo for chronic diarrhea.  Chief Complaint   Patient presents with    New Patient       Camacho Bhagat:   April 20, 2023    This patient has been seen and evaluated by me, Trent Villanueva MD and discussed with Carine GONZALEZ and agree with the findings and the plan in their note.    I examined the patient.  The assessment and plan was developed jointly with the fellow/resident see the assessment and plan below.    Services provided - MRE ordered,  Stool tests, No blood tests.       ASSESSMENT/PLAN:        Liver replaced by transplant (H)  History of hemicolectomy  Chronic diarrhea of unknown origin  He has been dealing with chronic intermittent diarrhea with 15-20 bowel movements per day and associated nocturnal diarrhea for the past 5 years, which started about 1 year after his hemicolectomy for CMV colitis. He does find relief with Imodium, 6-8 mg per day, with these flares. He has not experienced any weight loss or blood in the stool. He has had recent stool testing which returned negative for c. Diff, enteric bacteria and viral panel, including norovirus. He had a colonoscopy in 10/2022 with random biopsies that did not show evidence of colitis, ruling out recurrence of CMV colitis or mycophenolate colitis. Differential would also include malabsorption due to hemicolectomy, celiac disease, pancreatic insufficiency, thyroid dysfunction, and inflammatory bowel disease. We discussed proceeding with lab work including Celiac serologies, IgG, TSH, fecal calprotectin  and fecal elastase, as well as a MR Enterography due to kidney disease. If the initial workup is unrevealing, we discussed then proceeding with EGD with small bowel biopsies. The patient is agreeable to the plan.    - Adult GI  Referral - Consult Only  - Calprotectin Fecal ([facility])  - Tissue transglutaminase corinna IgA and IgG; Future  - IgA; Future  - IgG (quant); Future  - Elastase Fecal; Future  - TSH with free T4 reflex; Future  - MR Enterography wo and w Contrast; Future  - Adult GI  Referral - Procedure Only; Future    RTC after testing      Trent Villanueva MD  Division of Gastroenterology, Hepatology and Nutrition  [facility]    ---------------------------------------------------------------------------------------------------  HPI:    Mr. Bhagat is a very pleasant 57 year old male referred by Dr. Toñito Camejo, hepatology, for evaluation of chronic diarrhea. 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 He underwent ileostomy takedown in 1/2018. He does have a history notable for diarrhea of infectious origin (Plesiomonas shigelloides) in 3/2019 which was treated with ciprofloxacin. Also has a history of cholecystectomy more than 15 years ago per patient. He does also have a history of well controlled Type 2 Diabetes, history of stroke, and CKD stage 3a with hyperkalemia.    Today, he presents with about a 5 year history of intermittent diarrhea which started about 9 months to 1 year after his hemicolectomy procedure. He reports that he has actually been doing pretty well over the last month, but prior to this month, had been experiencing bouts of 15-20 bowel movements per day, which lasts about 1-1.5 days, once to twice per week. When he has these flares of diarrhea,  he reports nocturnal diarrhea, getting up about 10 times per night to have a bowel movement, and has had fecal incontinence at night. Describes these as a 6-7 on the [facility]ia stool chart. He does report some preceding lower abdominal cramping. During these flares he reports he will take 6-8 mg of Imodium and this will resolve the diarrhea. In between these flares, he will have about 2-3 bowel movements per day, which he describes as a 4-5 on the [facility] stool chart. He has noticed triggers for him tends to be salads. He reports if he eats a salad he will have a loose bowel movement about 30 minutes later. He also reports that the has had what he believes might be abdominal muscle spasms since his transplant surgery, that do not seem to be correlated with his bowel movements. He denies associated fevers, chills, weight loss, blood in the stool or black stools, floating stools, vomiting, fatigue, or irritability.    He completed stool testing on 3/9/23 which was negative for c. Diff, enteric bacteria and virus panel was negative, and norovirus negative. He denies any family history of inflammatory bowel disease or celiac disease.     ROS:    A 10-point review of systems was performed and is negative except as noted in the HPI    PROBLEM LIST  Specialty Problems          Gastroenterology Diagnoses    Sicca syndrome (H)        Hepatocellular carcinoma (H)        Liver transplant recipient (H)        Neutropenic colitis (H)        S/P liver transplant (H)        Liver transplant rejection (H)    Complication of Procedure: IR Liver Biopsy Percutaneous (06/01/2018)        CMV colitis (H)        Diarrhea of infectious origin (Plesiomonas shigelloides (formerly known Aeromonas shigelloides) in 3/14/2019 sample)           Patient Active Problem List    Diagnosis Date Noted    Morbid obesity (H) 12/03/2021     Priority: Medium    Chronic kidney disease, stage 3 (H) 12/13/2020     Priority: Medium    Hypertension secondary to  other renal disorders 05/14/2019     Priority: Medium    Diarrhea of infectious origin (Plesiomonas shigelloides (formerly known Aeromonas shigelloides) in 3/14/2019 sample) 03/25/2019     Priority: Medium    Type 2 diabetes mellitus with diabetic polyneuropathy, with long-term current use of insulin (H) 09/10/2018     Priority: Medium    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   Plan:  Increase tacrolimus dose, currently on low dose tacrolimus with level < 3.0.  Plan to increase for goal = 8.    Also on myfortic 720mg Q 12 hours.   Prednisone 2.5mg q day.          Abscess, intra-abdominal, postoperative 02/13/2018     Priority: Medium    Peristomal skin complication 11/16/2017     Priority: Medium     Replacing diagnoses that were inactivated after the 10/1/2021 regulatory import.      Painful periwound skin 11/16/2017     Priority: Medium    Elevated serum creatinine 10/16/2017     Priority: Medium    Pancytopenia (H) 08/25/2017     Priority: Medium    Ischemia of both lower extremities 08/25/2017     Priority: Medium     Distal ischemia due to shock/high pressor requirements      S/P liver transplant (H) 07/26/2017     Priority: Medium    Neutropenic colitis (H) 07/04/2017     Priority: Medium    Immunosuppressed status (H) 03/10/2017     Priority: Medium    Liver transplant recipient (H) 03/04/2017     Priority: Medium    Hyperkalemia 02/14/2017     Priority: Medium    Hepatocellular carcinoma (H) 01/27/2016     Priority: Medium    Osteoarthritis 01/18/2015     Priority: Medium    Rheumatoid arthritis (H) 01/18/2015     Priority: Medium    Medication refill- do not delete  11/13/2013     Priority: Medium    Fibromyalgia 08/15/2011     Priority: Medium    Sicca syndrome (H) 08/15/2011     Priority: Medium    Testicular hypofunction      Priority: Medium     Problem list name updated by automated process.  Provider to review      Carpal tunnel syndrome 07/08/2002     Priority: Medium       PERTINENT PAST MEDICAL HISTORY:  Past Medical History:   Diagnosis Date    Depressive disorder, not elsewhere classified     Diabetes type 2, controlled (H) 11/10/2016    Esophageal reflux     Fibromyalgia 1/2009    dx with Dr Benitez( Rhe[facility])    Gangrene of finger (H) 8/25/2017    H/O deep venous thrombosis 11/2001    Permanent IVC filter in place.    H/O CASTAÑEDA (nonalcoholic steatohepatitis)     H/O Pneumonia, organism unspecified(486) 10/2001    Included ARDS, sepsis, and  acute renal failure; hospitalized, required tracheostomy placement.    H/O: HTN (hypertension) 11/2001    No longer prescribed antihypertensive medication.      History of CVA (cerebrovascular accident)     History of hepatocellular carcinoma     History of liver transplant (H)     History of obstructive sleep apnea     No longer wears CPAP since losing approximately 200 pounds with his liver transplant and its complications.      HLD (hyperlipidemia)     Ischemia of both lower extremities 8/25/2017    Distal ischemia due to shock/high pressor requirements    Liver transplant rejection (H) 6/11/2018    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    Osteoarthritis     Presence of PERMANENT IVC filter     Rheumatoid arthritis(714.0)        PREVIOUS SURGERIES:  Past Surgical History:   Procedure Laterality Date    BENCH LIVER N/A 3/4/2017    Procedure: BENCH LIVER;  Surgeon: Jovan Tran MD;  Location: [facility] OR    CHOLECYSTECTOMY      COLONOSCOPY N/A 7/21/2017    Procedure: COMBINED COLONOSCOPY, SINGLE OR MULTIPLE BIOPSY/POLYPECTOMY BY BIOPSY;  Colonoscopy;  Surgeon: Izaiah Montes MD;  Location: [facility] GI    COLONOSCOPY N/A 10/24/2022    Procedure: COLONOSCOPY, WITH POLYPECTOMY AND BIOPSY;  Surgeon: Abril Mcneill MD;  Location: [facility] GI    ENDOSCOPIC RETROGRADE CHOLANGIOPANCREATOGRAM N/A 5/22/2018    Procedure: COMBINED ENDOSCOPIC RETROGRADE CHOLANGIOPANCREATOGRAPHY,  PLACE TUBE/STENT;  Endoscopic Retrograde Cholangiopancreatography with sphincterotomy and pancreatic duct stent placement;  Surgeon: Zay Gaitan MD;  Location: [facility] OR    ENT SURGERY      Repair of deviated septum    ESOPHAGOSCOPY, GASTROSCOPY, DUODENOSCOPY (EGD), COMBINED N/A 8/4/2016    Procedure: COMBINED ESOPHAGOSCOPY, GASTROSCOPY, DUODENOSCOPY (EGD), BIOPSY SINGLE OR MULTIPLE;  Surgeon: Trent Pederson MD;  Location:  GI    ESOPHAGOSCOPY, GASTROSCOPY, DUODENOSCOPY (EGD), COMBINED N/A 8/27/2017    Procedure: COMBINED ESOPHAGOSCOPY, GASTROSCOPY, DUODENOSCOPY (EGD);;  Surgeon: Los Wynn MD;  Location: [facility]    INCISION AND DRAINAGE ABDOMEN WASHOUT, COMBINED Right 2/14/2018    Procedure: COMBINED INCISION AND DRAINAGE ABDOMEN WASHOUT;  COMBINED INCISION AND DRAINAGE right ABDOMEN flank;  Surgeon: Sara Dinh MD;  Location: [facility]    LAPAROTOMY EXPLORATORY N/A 7/4/2017    Procedure: LAPAROTOMY EXPLORATORY;  Exploratory Laparotomy, washout;  Surgeon: Tip Zhang MD;  Location: [facility]    LAPAROTOMY EXPLORATORY N/A 7/5/2017    Procedure: LAPAROTOMY EXPLORATORY;  Exploratory Laparotomy, Washout with closure.;  Surgeon: Tip Zhang MD;  Location: [facility]    LAPAROTOMY EXPLORATORY N/A 7/26/2017    Procedure: LAPAROTOMY EXPLORATORY;  Exploratory Laparotomy, Right angelique-colectomy, end ileostomy, mucosal fistula, partial omentectomy;  Surgeon: Sara Dinh MD;  Location: [facility]    ORTHOPEDIC SURGERY Right     Repair of dislocated wrist.      RELEASE TRIGGER FINGER Right 11/10/2017    Procedure: RELEASE TRIGGER FINGER;  Debride, V-Y Flap Right Index Finger;  Surgeon: Momo Noonan MD;  [facility]    TAKEDOWN ILEOSTOMY N/A 1/5/2018    Procedure: TAKEDOWN ILEOSTOMY;  Exploratory Laparotomy, Lysis of Adhesions, Takedown Of End Ileostomy, Takedown of mucocutaneous fistula, ileocolic resection  And Colorectal Anastomosis, Primary repair of Ventral  Hernia, Anesthesia Block ;  Surgeon: Sara Dinh MD;  Location: [facility] OR    TRACHEOSTOMY      During hospitalization for pneumonia.      TRANSPLANT LIVER RECIPIENT  DONOR N/A 3/4/2017    Procedure: TRANSPLANT LIVER RECIPIENT  DONOR;  Surgeon: Jovan Tran MD;  Location: [facility] OR    [facility] TOTAL KNEE ARTHROPLASTY      Right knee arthroscopy       PREVIOUS ENDOSCOPY:  Colonoscopy 10/24/2022:   - The examined portion of the ileum was normal.   - Patent end-to-end ileo-colonic anastomosis, characterized by healthy appearing mucosa.   - Normal mucosa in the entire examined colon. Biopsied.   - Diverticulosis in the recto-sigmoid colon.    - Two 1 to 2 mm polyps in the rectum and in the descending colon, removed with a jumbo cold forceps. Resected and retrieved.   - Internal hemorrhoids.    - The examination was otherwise normal on direct and retroflexion views.     Random biopsies were negative for colitis.    Ileoscopy 2017:  Acute anemia, etiology unclear  No evidence of GI bleeding     Previous CT abd/pelvis 2017:   CONCLUSION:   1.  Right colonic wall thickening suggesting colitis. Follow-up is necessary to confirm resolution in order to exclude colonic mass.   2.  Transverse colon is not well distended reducing evaluation for wall thickening.   3.  Small amount of ascites.   4.  Splenomegaly.   5.  Prominent portal and splenic veins. If confirmation of vascular patency is indicated consider follow-up ultrasound of the liver with Doppler.   6.  Small right pleural effusion.   7.  Stranding in the subcutaneous fat of the ventral pelvis. Does the patient receive subcutaneous injections?      ALLERGIES:     Allergies   Allergen Reactions    Erythromycin GI Disturbance    Losartan Other (See Comments)     Consistently develops hyperkalemia    Vioxx      Nausea, vomiting       PERTINENT MEDICATIONS:  Current Outpatient Medications   Medication    alcohol swab prep pads     alpha-lipoic acid 600 MG capsule    amLODIPine (NORVASC) 10 MG tablet    blood glucose (NO BRAND SPECIFIED) lancets standard    blood glucose (NO BRAND SPECIFIED) test strip    blood glucose calibration (NO BRAND SPECIFIED) solution    Blood Glucose Monitoring Suppl (CONTOUR BLOOD GLUCOSE SYSTEM) w/Device KIT    cholecalciferol (VITAMIN D3) 1000 UNIT tablet    Continuous Blood Gluc Sensor (DEXCOM G6 SENSOR) MISC    Continuous Blood Gluc Transmit (DEXCOM G6 TRANSMITTER) MISC    cyclobenzaprine (FLEXERIL) 10 MG tablet    doxazosin (CARDURA) 2 MG tablet    fluticasone (FLONASE) 50 MCG/ACT nasal spray    furosemide (LASIX) 40 MG tablet    gabapentin (NEURONTIN) 300 MG capsule    Glucose Blood (BLOOD GLUCOSE TEST STRIPS) STRP    Insulin Disposable Pump (OMNIPOD 5 G6 POD, GEN 5,) MISC    Insulin Disposable Pump (OMNIPOD 5 G6 POD, GEN 5,) MISC    Insulin Lispro (HUMALOG KWIKPEN) 200 UNIT/ML soln    insulin pen needle (BD ENE U/F) 32G X 4 MM miscellaneous    lidocaine (LIDODERM) 5 % patch    losartan (COZAAR) 25 MG tablet    metoprolol succinate ER (TOPROL XL) 200 MG 24 hr tablet    mycophenolic acid (GENERIC EQUIVALENT) 360 MG EC tablet    oxyCODONE (ROXICODONE) 5 MG tablet    patiromer (VELTASSA) 8.4 g packet    predniSONE (DELTASONE) 2.5 MG tablet    sildenafil (REVATIO) 20 MG tablet    tacrolimus (GENERIC EQUIVALENT) 1 MG capsule    thin (NO BRAND SPECIFIED) lancets    ursodiol (ACTIGALL) 500 MG tablet     Current Facility-Administered Medications   Medication    lidocaine (PF) (XYLOCAINE) 1 % injection 3 mL    lidocaine (PF) (XYLOCAINE) 1 % injection 3 mL    triamcinolone (KENALOG-40) injection 40 mg    triamcinolone (KENALOG-40) injection 40 mg       SOCIAL HISTORY:  Social History     Socioeconomic History    Marital status:      Spouse name: Not on file    Number of children: 1    Years of education: Not on file    Highest education level: Not on file   Occupational History    Occupation:      Tobacco Use    Smoking status: Former     Packs/day: 0.25     Years: 2.00     Pack years: 0.50     Types: Cigarettes     Quit date:      Years since quittin.3    Smokeless tobacco: Former     Types: Chew     Quit date: 10/8/2015    Tobacco comments:     1 Cigar once every other month.   Vaping Use    Vaping status: Not on file   Substance and Sexual Activity    Alcohol use: No     Alcohol/week: 0.0 standard drinks of alcohol     Comment: No alcohol since liver transplant.      Drug use: No    Sexual activity: Not Currently     Partners: Female   Other Topics Concern    Parent/sibling w/ CABG, MI or angioplasty before 65F 55M? 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    Cervical Cancer Maternal Grandmother     Cerebrovascular Disease Maternal Grandfather     No Known Problems Paternal Grandmother     Prostate Cancer Paternal Grandfather     Colon Cancer No family hx of     Hyperlipidemia No family hx of     Coronary Artery Disease No family hx of     Breast Cancer No family hx of        Past/family/social history reviewed and no changes    PHYSICAL EXAMINATION:  Vitals BP (!) 150/70   Pulse 72   Ht 1.829 m (6')   Wt 130.2 kg (287 lb)   SpO2 99%   BMI 38.92 kg/m     Wt   Wt Readings from Last 2 Encounters:   23 130.2 kg (287 lb)   23 131.5 kg (290 lb)      Gen: Pt sitting up on exam table in NAD, interactive and  cooperative on exam  Eyes: sclerae anicteric, no injection  GI: Abd soft, nontender  Skin: Warm, dry, no jaundice, nails appear healthy  Neuro: alert, oriented, answers questions appropriately      PERTINENT STUDIES:    Orders Only on 03/07/2023   Component Date Value Ref Range Status    C Difficile Toxin B by PCR 03/09/2023 Negative  Negative Final    Campylobacter group 03/09/2023 Not Detected  Not Detected Final    Salmonella species 03/09/2023 Not Detected  Not Detected Final    Shigella species 03/09/2023 Not Detected  Not Detected Final    Vibrio group 03/09/2023 Not Detected  Not Detected Final    Rotavirus 03/09/2023 Not Detected  Not Detected Final    Shiga toxin 1 gene 03/09/2023 Not Detected  Not Detected Final    Shiga toxin 2 gene 03/09/2023 Not Detected  Not Detected Final    Norovirus I and II 03/09/2023 Not Detected  Not Detected Final    Yersinia enterocolitica 03/09/2023 Not Detected  Not Detected Final         Again, thank you for allowing me to participate in the care of your patient.      Sincerely,    Trent Villanueva MD

## 2023-04-18 NOTE — PROGRESS NOTES
GI CLINIC VISIT    CC/REFERRING MD:  Dr. Toñito Camejo, hepatology  REASON FOR CONSULTATION:   Mr. Bhagat is a 57 year old male who I was asked to see in consultation at the request of Dr. Toñito Camejo for chronic diarrhea.  Chief Complaint   Patient presents with     New Patient       Camacho Bhagat:   April 20, 2023    This patient has been seen and evaluated by me, Trent Villanueva MD and discussed with Carine GONZALEZ and agree with the findings and the plan in their note.    I examined the patient.  The assessment and plan was developed jointly with the fellow/resident see the assessment and plan below.    Services provided - MRE ordered,  Stool tests, No blood tests.       ASSESSMENT/PLAN:        Liver replaced by transplant (H)  History of hemicolectomy    liver transplant (3/2017)    who underwent emergent extended right hemicolectomy in the setting of an acute abdomen.     He underwent ileostomy takedown (1/5/2018)     c/b abdominal wall abscess at former drain site and  EUA with I and D on 2/14/18 with Dr. Dinh.     Chronic diarrhea of unknown origin  He has been dealing with chronic intermittent diarrhea with 15-20 bowel movements per day and associated nocturnal diarrhea for the past 5 years, which started about 1 year after his hemicolectomy for CMV colitis. He does find relief with Imodium, 6-8 mg per day, with these flares. He has not experienced any weight loss or blood in the stool. He has had recent stool testing which returned negative for c. Diff, enteric bacteria and viral panel, including norovirus. He had a colonoscopy in 10/2022 with random biopsies that did not show evidence of colitis, ruling out recurrence of CMV colitis or mycophenolate colitis. Differential would also include malabsorption due to hemicolectomy, celiac disease, pancreatic insufficiency, thyroid dysfunction, and inflammatory bowel disease. We discussed proceeding with lab work including Celiac serologies, IgG, TSH, fecal  calprotectin and fecal elastase, as well as a MR Enterography due to kidney disease. If the initial workup is unrevealing, we discussed then proceeding with EGD with small bowel biopsies. The patient is agreeable to the plan.    - Adult GI  Referral - Consult Only  - Calprotectin Fecal ([facility])  - Tissue transglutaminase corinna IgA and IgG; Future  - IgA; Future  - IgG (quant); Future  - Elastase Fecal; Future  - TSH with free T4 reflex; Future  - MR Enterography wo and w Contrast; Future  - Adult GI  Referral - Procedure Only; Future    RTC after testing      Trent Villanueva MD  Division of Gastroenterology, Hepatology and Nutrition  [facility]    ---------------------------------------------------------------------------------------------------  HPI:    Mr. Bhagat is a very pleasant 57 year old male referred by Dr. Toñito Camejo, hepatology, for evaluation of chronic diarrhea. The patient is status post liver transplant at the [facility] for CASTAÑEDA and HCC on 3/4/2017 now on tacrolimus, mycophenolate, prednisone and ursodiol, status post right hemicolectomy and ileostomy creation for CMV colitis in the fall of 2017. He underwent ileostomy takedown in 1/2018. He does have a history notable for diarrhea of infectious origin (Plesiomonas shigelloides) in 3/2019 which was treated with ciprofloxacin. Also has a history of cholecystectomy more than 15 years ago per patient. He does also have a history of well controlled Type 2 Diabetes, history of stroke, and CKD stage 3a with hyperkalemia.    Today, he presents with about a 5 year history of intermittent diarrhea which started about 9 months to 1 year after his hemicolectomy procedure. He reports that he has actually been doing pretty well over the last month, but prior to this month, had been experiencing bouts of 15-20 bowel movements per day, which lasts about 1-1.5 days, once to twice per week. When he has these flares  of diarrhea, he reports nocturnal diarrhea, getting up about 10 times per night to have a bowel movement, and has had fecal incontinence at night. Describes these as a 6-7 on the [place] stool chart. He does report some preceding lower abdominal cramping. During these flares he reports he will take 6-8 mg of Imodium and this will resolve the diarrhea. In between these flares, he will have about 2-3 bowel movements per day, which he describes as a 4-5 on the [place] stool chart. He has noticed triggers for him tends to be salads. He reports if he eats a salad he will have a loose bowel movement about 30 minutes later. He also reports that the has had what he believes might be abdominal muscle spasms since his transplant surgery, that do not seem to be correlated with his bowel movements. He denies associated fevers, chills, weight loss, blood in the stool or black stools, floating stools, vomiting, fatigue, or irritability.    He completed stool testing on 3/9/23 which was negative for c. Diff, enteric bacteria and virus panel was negative, and norovirus negative. He denies any family history of inflammatory bowel disease or celiac disease.     ROS:    A 10-point review of systems was performed and is negative except as noted in the HPI    PROBLEM LIST  Specialty Problems        Gastroenterology Diagnoses    Sicca syndrome (H)        Hepatocellular carcinoma (H)        Liver transplant recipient (H)        Neutropenic colitis (H)        S/P liver transplant (H)        Liver transplant rejection (H)    Complication of Procedure: IR Liver Biopsy Percutaneous (06/01/2018)        CMV colitis (H)        Diarrhea of infectious origin (Plesiomonas shigelloides (formerly known Aeromonas shigelloides) in 3/14/2019 sample)           Patient Active Problem List    Diagnosis Date Noted     Morbid obesity (H) 12/03/2021     Priority: Medium     Chronic kidney disease, stage 3 (H) 12/13/2020     Priority: Medium     Hypertension  secondary to other renal disorders 05/14/2019     Priority: Medium     Diarrhea of infectious origin (Plesiomonas shigelloides (formerly known Aeromonas shigelloides) in 3/14/2019 sample) 03/25/2019     Priority: Medium     Type 2 diabetes mellitus with diabetic polyneuropathy, with long-term current use of insulin (H) 09/10/2018     Priority: Medium     CMV colitis (H) 08/22/2018     Priority: Medium     Liver transplant rejection (H) 06/01/2018     Priority: Medium     Acute mild cellular rejection.   Plan:  Increase tacrolimus dose, currently on low dose tacrolimus with level < 3.0.  Plan to increase for goal = 8.    Also on myfortic 720mg Q 12 hours.   Prednisone 2.5mg q day.           Abscess, intra-abdominal, postoperative 02/13/2018     Priority: Medium     Peristomal skin complication 11/16/2017     Priority: Medium     Replacing diagnoses that were inactivated after the 10/1/2021 regulatory import.       Painful periwound skin 11/16/2017     Priority: Medium     Elevated serum creatinine 10/16/2017     Priority: Medium     Pancytopenia (H) 08/25/2017     Priority: Medium     Ischemia of both lower extremities 08/25/2017     Priority: Medium     Distal ischemia due to shock/high pressor requirements       S/P liver transplant (H) 07/26/2017     Priority: Medium     Neutropenic colitis (H) 07/04/2017     Priority: Medium     Immunosuppressed status (H) 03/10/2017     Priority: Medium     Liver transplant recipient (H) 03/04/2017     Priority: Medium     Hyperkalemia 02/14/2017     Priority: Medium     Hepatocellular carcinoma (H) 01/27/2016     Priority: Medium     Osteoarthritis 01/18/2015     Priority: Medium     Rheumatoid arthritis (H) 01/18/2015     Priority: Medium     Medication refill- do not delete  11/13/2013     Priority: Medium     Fibromyalgia 08/15/2011     Priority: Medium     Sicca syndrome (H) 08/15/2011     Priority: Medium     Testicular hypofunction      Priority: Medium     Problem list  name updated by automated process. Provider to review       Carpal tunnel syndrome 07/08/2002     Priority: Medium       PERTINENT PAST MEDICAL HISTORY:  Past Medical History:   Diagnosis Date     Depressive disorder, not elsewhere classified      Diabetes type 2, controlled (H) 11/10/2016     Esophageal reflux      Fibromyalgia 1/2009    dx with Dr Benitez( Rhe[facility])     Gangrene of finger (H) 8/25/2017     H/O deep venous thrombosis 11/2001    Permanent IVC filter in place.     H/O CASTAÑEDA (nonalcoholic steatohepatitis)      H/O Pneumonia, organism unspecified(486) 10/2001    Included ARDS, sepsis, and  acute renal failure; hospitalized, required tracheostomy placement.     H/O: HTN (hypertension) 11/2001    No longer prescribed antihypertensive medication.       History of CVA (cerebrovascular accident)      History of hepatocellular carcinoma      History of liver transplant (H)      History of obstructive sleep apnea     No longer wears CPAP since losing approximately 200 pounds with his liver transplant and its complications.       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     Osteoarthritis      Presence of PERMANENT IVC filter      Rheumatoid arthritis(714.0)        PREVIOUS SURGERIES:  Past Surgical History:   Procedure Laterality Date     BENCH LIVER N/A 3/4/2017    Procedure: BENCH LIVER;  Surgeon: Jovan Tran MD;  Location: [facility] OR     CHOLECYSTECTOMY       COLONOSCOPY N/A 7/21/2017    Procedure: COMBINED COLONOSCOPY, SINGLE OR MULTIPLE BIOPSY/POLYPECTOMY BY BIOPSY;  Colonoscopy;  Surgeon: Izaiah Montes MD;  Location: [facility] GI     COLONOSCOPY N/A 10/24/2022    Procedure: COLONOSCOPY, WITH POLYPECTOMY AND BIOPSY;  Surgeon: Abril Mcneill MD;  Location: [facility] GI     ENDOSCOPIC RETROGRADE CHOLANGIOPANCREATOGRAM N/A 5/22/2018     Procedure: COMBINED ENDOSCOPIC RETROGRADE CHOLANGIOPANCREATOGRAPHY, PLACE TUBE/STENT;  Endoscopic Retrograde Cholangiopancreatography with sphincterotomy and pancreatic duct stent placement;  Surgeon: Zay Gaitan MD;  Location: [facility]     ENT SURGERY      Repair of deviated septum     ESOPHAGOSCOPY, GASTROSCOPY, DUODENOSCOPY (EGD), COMBINED N/A 8/4/2016    Procedure: COMBINED ESOPHAGOSCOPY, GASTROSCOPY, DUODENOSCOPY (EGD), BIOPSY SINGLE OR MULTIPLE;  Surgeon: Trent Pederson MD;  Location:  GI     ESOPHAGOSCOPY, GASTROSCOPY, DUODENOSCOPY (EGD), COMBINED N/A 8/27/2017    Procedure: COMBINED ESOPHAGOSCOPY, GASTROSCOPY, DUODENOSCOPY (EGD);;  Surgeon: Los Wynn MD;  Location:  GI     INCISION AND DRAINAGE ABDOMEN WASHOUT, COMBINED Right 2/14/2018    Procedure: COMBINED INCISION AND DRAINAGE ABDOMEN WASHOUT;  COMBINED INCISION AND DRAINAGE right ABDOMEN flank;  Surgeon: Sara Dinh MD;  [facility]: [facility]     LAPAROTOMY EXPLORATORY N/A 7/4/2017    Procedure: LAPAROTOMY EXPLORATORY;  Exploratory Laparotomy, washout;  Surgeon: Tip Zhang MD;  [facility]     LAPAROTOMY EXPLORATORY N/A 7/5/2017    Procedure: LAPAROTOMY EXPLORATORY;  Exploratory Laparotomy, Washout with closure.;  Surgeon: Tip Zhang MD;  [facility]     LAPAROTOMY EXPLORATORY N/A 7/26/2017    Procedure: LAPAROTOMY EXPLORATORY;  Exploratory Laparotomy, Right angelique-colectomy, end ileostomy, mucosal fistula, partial omentectomy;  Surgeon: Sara Dinh MD;  [facility]     ORTHOPEDIC SURGERY Right     Repair of dislocated wrist.       RELEASE TRIGGER FINGER Right 11/10/2017    Procedure: RELEASE TRIGGER FINGER;  Debride, V-Y Flap Right Index Finger;  Surgeon: Momo Noonan MD;  [facility]     TAKEDOWN ILEOSTOMY N/A 1/5/2018    Procedure: TAKEDOWN ILEOSTOMY;  Exploratory Laparotomy, Lysis of Adhesions, Takedown Of End Ileostomy, Takedown of mucocutaneous  fistula, ileocolic resection  And Colorectal Anastomosis, Primary repair of Ventral Hernia, Anesthesia Block ;  Surgeon: Sara Dinh MD;  Location: [facility]     TRACHEOSTOMY      During hospitalization for pneumonia.       TRANSPLANT LIVER RECIPIENT  DONOR N/A 3/4/2017    Procedure: TRANSPLANT LIVER RECIPIENT  DONOR;  Surgeon: Jovan Tran MD;  Location: [facility] OR     [facility] TOTAL KNEE ARTHROPLASTY      Right knee arthroscopy       PREVIOUS ENDOSCOPY:  Colonoscopy 10/24/2022:   - The examined portion of the ileum was normal.   - Patent end-to-end ileo-colonic anastomosis, characterized by healthy appearing mucosa.   - Normal mucosa in the entire examined colon. Biopsied.   - Diverticulosis in the recto-sigmoid colon.    - Two 1 to 2 mm polyps in the rectum and in the descending colon, removed with a jumbo cold forceps. Resected and retrieved.   - Internal hemorrhoids.    - The examination was otherwise normal on direct and retroflexion views.     Random biopsies were negative for colitis.    Ileoscopy 2017:  Acute anemia, etiology unclear  No evidence of GI bleeding     Previous CT abd/pelvis 2017:   CONCLUSION:   1.  Right colonic wall thickening suggesting colitis. Follow-up is necessary to confirm resolution in order to exclude colonic mass.   2.  Transverse colon is not well distended reducing evaluation for wall thickening.   3.  Small amount of ascites.   4.  Splenomegaly.   5.  Prominent portal and splenic veins. If confirmation of vascular patency is indicated consider follow-up ultrasound of the liver with Doppler.   6.  Small right pleural effusion.   7.  Stranding in the subcutaneous fat of the ventral pelvis. Does the patient receive subcutaneous injections?      ALLERGIES:     Allergies   Allergen Reactions     Erythromycin GI Disturbance     Losartan Other (See Comments)     Consistently develops hyperkalemia     Vioxx      Nausea, vomiting       PERTINENT  MEDICATIONS:  Current Outpatient Medications   Medication     alcohol swab prep pads     alpha-lipoic acid 600 MG capsule     amLODIPine (NORVASC) 10 MG tablet     blood glucose (NO BRAND SPECIFIED) lancets standard     blood glucose (NO BRAND SPECIFIED) test strip     blood glucose calibration (NO BRAND SPECIFIED) solution     Blood Glucose Monitoring Suppl (CONTOUR BLOOD GLUCOSE SYSTEM) w/Device KIT     cholecalciferol (VITAMIN D3) 1000 UNIT tablet     Continuous Blood Gluc Sensor (DEXCOM G6 SENSOR) MISC     Continuous Blood Gluc Transmit (DEXCOM G6 TRANSMITTER) MISC     cyclobenzaprine (FLEXERIL) 10 MG tablet     doxazosin (CARDURA) 2 MG tablet     fluticasone (FLONASE) 50 MCG/ACT nasal spray     furosemide (LASIX) 40 MG tablet     gabapentin (NEURONTIN) 300 MG capsule     Glucose Blood (BLOOD GLUCOSE TEST STRIPS) STRP     Insulin Disposable Pump (OMNIPOD 5 G6 POD, GEN 5,) MISC     Insulin Disposable Pump (OMNIPOD 5 G6 POD, GEN 5,) MISC     Insulin Lispro (HUMALOG KWIKPEN) 200 UNIT/ML soln     insulin pen needle (BD ENE U/F) 32G X 4 MM miscellaneous     lidocaine (LIDODERM) 5 % patch     losartan (COZAAR) 25 MG tablet     metoprolol succinate ER (TOPROL XL) 200 MG 24 hr tablet     mycophenolic acid (GENERIC EQUIVALENT) 360 MG EC tablet     oxyCODONE (ROXICODONE) 5 MG tablet     patiromer (VELTASSA) 8.4 g packet     predniSONE (DELTASONE) 2.5 MG tablet     sildenafil (REVATIO) 20 MG tablet     tacrolimus (GENERIC EQUIVALENT) 1 MG capsule     thin (NO BRAND SPECIFIED) lancets     ursodiol (ACTIGALL) 500 MG tablet     Current Facility-Administered Medications   Medication     lidocaine (PF) (XYLOCAINE) 1 % injection 3 mL     lidocaine (PF) (XYLOCAINE) 1 % injection 3 mL     triamcinolone (KENALOG-40) injection 40 mg     triamcinolone (KENALOG-40) injection 40 mg       SOCIAL HISTORY:  Social History     Socioeconomic History     Marital status:      Spouse name: Not on file     Number of children: 1      Years of education: Not on file     Highest education level: Not on file   Occupational History     Occupation:     Tobacco Use     Smoking status: Former     Packs/day: 0.25     Years: 2.00     Pack years: 0.50     Types: Cigarettes     Quit date:      Years since quittin.3     Smokeless tobacco: Former     Types: Chew     Quit date: 10/8/2015     Tobacco comments:     1 Cigar once every other month.   Vaping Use     Vaping status: Not on file   Substance and Sexual Activity     Alcohol use: No     Alcohol/week: 0.0 standard drinks of alcohol     Comment: No alcohol since liver transplant.       Drug use: No     Sexual activity: Not Currently     Partners: Female   Other Topics Concern     Parent/sibling w/ CABG, MI or angioplasty before 65F 55M? 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     Cervical Cancer Maternal Grandmother      Cerebrovascular Disease Maternal Grandfather      No Known Problems Paternal Grandmother      Prostate Cancer Paternal Grandfather      Colon Cancer No family hx of      Hyperlipidemia No family hx of      Coronary Artery Disease No family hx of      Breast Cancer No family hx of        Past/family/social history reviewed and no changes    PHYSICAL EXAMINATION:  Vitals BP (!) 150/70   Pulse 72   Ht 1.829 m (6')   Wt 130.2 kg (287 lb)   SpO2 99%   BMI 38.92 kg/m     Wt   Wt Readings  from Last 2 Encounters:   04/18/23 130.2 kg (287 lb)   04/17/23 131.5 kg (290 lb)      Gen: Pt sitting up on exam table in NAD, interactive and cooperative on exam  Eyes: sclerae anicteric, no injection  GI: Abd soft, nontender  Skin: Warm, dry, no jaundice, nails appear healthy  Neuro: alert, oriented, answers questions appropriately      PERTINENT STUDIES:    Orders Only on 03/07/2023   Component Date Value Ref Range Status     C Difficile Toxin B by PCR 03/09/2023 Negative  Negative Final     Campylobacter group 03/09/2023 Not Detected  Not Detected Final     Salmonella species 03/09/2023 Not Detected  Not Detected Final     Shigella species 03/09/2023 Not Detected  Not Detected Final     Vibrio group 03/09/2023 Not Detected  Not Detected Final     Rotavirus 03/09/2023 Not Detected  Not Detected Final     Shiga toxin 1 gene 03/09/2023 Not Detected  Not Detected Final     Shiga toxin 2 gene 03/09/2023 Not Detected  Not Detected Final     Norovirus I and II 03/09/2023 Not Detected  Not Detected Final     Yersinia enterocolitica 03/09/2023 Not Detected  Not Detected Final

## 2023-04-18 NOTE — NURSING NOTE
Chief Complaint   Patient presents with     New Patient       Vitals:    04/18/23 0923   BP: (!) 150/70   Pulse: 72   SpO2: 99%   Weight: 130.2 kg (287 lb)   Height: 1.829 m (6')       Body mass index is 38.92 kg/m .    Jes Garcia

## 2023-04-19 ENCOUNTER — TELEPHONE (OUTPATIENT)
Dept: GASTROENTEROLOGY | Facility: CLINIC | Age: 58
End: 2023-04-19

## 2023-04-19 ENCOUNTER — TELEPHONE (OUTPATIENT)
Dept: ENDOCRINOLOGY | Facility: CLINIC | Age: 58
End: 2023-04-19
Payer: COMMERCIAL

## 2023-04-19 ENCOUNTER — LAB (OUTPATIENT)
Dept: LAB | Facility: CLINIC | Age: 58
End: 2023-04-19
Payer: COMMERCIAL

## 2023-04-19 DIAGNOSIS — K52.9 CHRONIC DIARRHEA OF UNKNOWN ORIGIN: ICD-10-CM

## 2023-04-19 DIAGNOSIS — Z90.49 HISTORY OF HEMICOLECTOMY: ICD-10-CM

## 2023-04-19 DIAGNOSIS — Z94.4 LIVER REPLACED BY TRANSPLANT (H): ICD-10-CM

## 2023-04-19 DIAGNOSIS — N18.30 STAGE 3 CHRONIC KIDNEY DISEASE, UNSPECIFIED WHETHER STAGE 3A OR 3B CKD (H): ICD-10-CM

## 2023-04-19 LAB
ALBUMIN SERPL BCG-MCNC: 4.2 G/DL (ref 3.5–5.2)
ALP SERPL-CCNC: 167 U/L (ref 40–129)
ALT SERPL W P-5'-P-CCNC: 18 U/L (ref 10–50)
ANION GAP SERPL CALCULATED.3IONS-SCNC: 12 MMOL/L (ref 7–15)
AST SERPL W P-5'-P-CCNC: 22 U/L (ref 10–50)
BILIRUB DIRECT SERPL-MCNC: <0.2 MG/DL (ref 0–0.3)
BILIRUB SERPL-MCNC: 0.4 MG/DL
BUN SERPL-MCNC: 43.2 MG/DL (ref 6–20)
CALCIUM SERPL-MCNC: 9.1 MG/DL (ref 8.6–10)
CHLORIDE SERPL-SCNC: 106 MMOL/L (ref 98–107)
CREAT SERPL-MCNC: 1.63 MG/DL (ref 0.67–1.17)
DEPRECATED HCO3 PLAS-SCNC: 19 MMOL/L (ref 22–29)
ERYTHROCYTE [DISTWIDTH] IN BLOOD BY AUTOMATED COUNT: 13.2 % (ref 10–15)
GFR SERPL CREATININE-BSD FRML MDRD: 49 ML/MIN/1.73M2
GLUCOSE SERPL-MCNC: 99 MG/DL (ref 70–99)
HCT VFR BLD AUTO: 34.4 % (ref 40–53)
HGB BLD-MCNC: 11.5 G/DL (ref 13.3–17.7)
MCH RBC QN AUTO: 30.5 PG (ref 26.5–33)
MCHC RBC AUTO-ENTMCNC: 33.4 G/DL (ref 31.5–36.5)
MCV RBC AUTO: 91 FL (ref 78–100)
PHOSPHATE SERPL-MCNC: 2.7 MG/DL (ref 2.5–4.5)
PLATELET # BLD AUTO: 127 10E3/UL (ref 150–450)
POTASSIUM SERPL-SCNC: 4.9 MMOL/L (ref 3.4–5.3)
PROT SERPL-MCNC: 7 G/DL (ref 6.4–8.3)
RBC # BLD AUTO: 3.77 10E6/UL (ref 4.4–5.9)
SODIUM SERPL-SCNC: 137 MMOL/L (ref 136–145)
TACROLIMUS BLD-MCNC: 4.5 UG/L (ref 5–15)
TME LAST DOSE: ABNORMAL H
TME LAST DOSE: ABNORMAL H
TSH SERPL DL<=0.005 MIU/L-ACNC: 3.12 UIU/ML (ref 0.3–4.2)
WBC # BLD AUTO: 5.3 10E3/UL (ref 4–11)

## 2023-04-19 PROCEDURE — 82784 ASSAY IGA/IGD/IGG/IGM EACH: CPT

## 2023-04-19 PROCEDURE — 86364 TISS TRNSGLTMNASE EA IG CLAS: CPT | Mod: 59

## 2023-04-19 PROCEDURE — 84443 ASSAY THYROID STIM HORMONE: CPT

## 2023-04-19 PROCEDURE — 85027 COMPLETE CBC AUTOMATED: CPT

## 2023-04-19 PROCEDURE — 80197 ASSAY OF TACROLIMUS: CPT

## 2023-04-19 PROCEDURE — 82248 BILIRUBIN DIRECT: CPT

## 2023-04-19 PROCEDURE — 80053 COMPREHEN METABOLIC PANEL: CPT

## 2023-04-19 PROCEDURE — 36415 COLL VENOUS BLD VENIPUNCTURE: CPT

## 2023-04-19 PROCEDURE — 82310 ASSAY OF CALCIUM: CPT

## 2023-04-19 PROCEDURE — 84075 ASSAY ALKALINE PHOSPHATASE: CPT

## 2023-04-19 NOTE — TELEPHONE ENCOUNTER
Patient call:     Appointment type: return diabetes   Provider: Brett  Return date: 5/8 appt cancelled   Speciality phone number: [phone]  Additional appointment(s) needed:   Additional notes: lvm and sent myc x2  Max attempts reached to contact pt   Can be rescheduled with any provider   4/19

## 2023-04-19 NOTE — TELEPHONE ENCOUNTER
[facility] Call Center    Phone Message    May a detailed message be left on voicemail: yes     Reason for Call: Order(s): Other:   Reason for requested: Elastase Fecal  Date needed: ASAP  Provider name: Dr. Villanueva  Pt is calling [facility] stating the lab has told him the order was entered for [facility], please reach out to Pt ASAP as he is at the lab. Thank you!      Action Taken: Message routed to:  [facility] GI    Travel Screening: Not Applicable

## 2023-04-20 DIAGNOSIS — K52.9 CHRONIC DIARRHEA OF UNKNOWN ORIGIN: Primary | ICD-10-CM

## 2023-04-20 LAB
CMV DNA SPEC NAA+PROBE-ACNC: NOT DETECTED IU/ML
IGA SERPL-MCNC: 72 MG/DL (ref 84–499)
IGG SERPL-MCNC: 824 MG/DL (ref 610–1616)
TTG IGA SER-ACNC: <0.2 U/ML
TTG IGG SER-ACNC: 0.7 U/ML

## 2023-04-24 PROCEDURE — 82653 EL-1 FECAL QUANTITATIVE: CPT | Performed by: INTERNAL MEDICINE

## 2023-04-24 PROCEDURE — 83993 ASSAY FOR CALPROTECTIN FECAL: CPT | Performed by: INTERNAL MEDICINE

## 2023-04-25 ENCOUNTER — TELEPHONE (OUTPATIENT)
Dept: ENDOCRINOLOGY | Facility: CLINIC | Age: 58
End: 2023-04-25
Payer: COMMERCIAL

## 2023-04-25 DIAGNOSIS — E11.42 TYPE 2 DIABETES MELLITUS WITH DIABETIC POLYNEUROPATHY, WITH LONG-TERM CURRENT USE OF INSULIN (H): Primary | ICD-10-CM

## 2023-04-25 DIAGNOSIS — Z79.4 TYPE 2 DIABETES MELLITUS WITH DIABETIC POLYNEUROPATHY, WITH LONG-TERM CURRENT USE OF INSULIN (H): Primary | ICD-10-CM

## 2023-04-25 NOTE — TELEPHONE ENCOUNTER
[facility] Call Center    Phone Message    May a detailed message be left on voicemail: yes     Reason for Call: Other: Patient is would like to speak to care team about  his Neuropathy symptoms and if he can be prescribed something to help with the discomfort.     Thank you for following up .     Action Taken: Other: ENDO    Travel Screening: Not Applicable

## 2023-04-25 NOTE — TELEPHONE ENCOUNTER
Spoke w/ Pt: states neuropathy from knees to feet, intense itching from knees to feet. Asking for Rx.   Kenzie Stock RN on 4/25/2023 at 1:24 PM       RE    May a detailed message be left on voicemail: yes      Reason for Call: Other: Patient is would like to speak to care team about  his Neuropathy symptoms and if he can be prescribed something to help with the discomfort.      Thank you for following up .      Action Taken: Other: ENDO     Travel Screening: Not Applicable

## 2023-04-26 ENCOUNTER — TELEPHONE (OUTPATIENT)
Dept: TRANSPLANT | Facility: CLINIC | Age: 58
End: 2023-04-26
Payer: COMMERCIAL

## 2023-04-26 ENCOUNTER — MYC MEDICAL ADVICE (OUTPATIENT)
Dept: INTERNAL MEDICINE | Facility: CLINIC | Age: 58
End: 2023-04-26
Payer: COMMERCIAL

## 2023-04-26 DIAGNOSIS — Z79.60 LONG-TERM USE OF IMMUNOSUPPRESSANT MEDICATION: ICD-10-CM

## 2023-04-26 DIAGNOSIS — L29.9 PRURITIC DISORDER: Primary | ICD-10-CM

## 2023-04-26 DIAGNOSIS — Z94.4 LIVER REPLACED BY TRANSPLANT (H): Primary | ICD-10-CM

## 2023-04-26 DIAGNOSIS — L29.9 ITCHING: ICD-10-CM

## 2023-04-26 LAB
CALPROTECTIN STL-MCNT: 12.7 MG/KG (ref 0–49.9)
ELASTASE PANC STL-MCNT: 468 UG/G

## 2023-04-26 NOTE — TELEPHONE ENCOUNTER
Complaining of severe itching wanted to see if there is anything else he can take (Benadryl did not work) stated he is itching so hard he ines blood.   
Returned Camacho's call. Camacho is experiencing severe itching that started about 2 weeks ago and has increased in intensity. No evidence of a rash anywhere, itching is from knees to his feet. Denies any new products, detergents, soaps, etc. Most recent liver tests (4/17/2023) looked OK, recommended he call/message his PCP or derm.      Unsure if related to neuropathy, Camacho did message his orthopedic provider as well.     Camacho is due for his skin check, derm referral placed for that. He is aware and will await a call for scheduling.   
0

## 2023-04-27 RX ORDER — HYDROXYZINE HYDROCHLORIDE 25 MG/1
25 TABLET, FILM COATED ORAL 3 TIMES DAILY PRN
Qty: 30 TABLET | Refills: 1 | Status: SHIPPED | OUTPATIENT
Start: 2023-04-27 | End: 2023-05-15

## 2023-04-27 RX ORDER — TRIAMCINOLONE ACETONIDE 1 MG/G
OINTMENT TOPICAL 2 TIMES DAILY
Qty: 80 G | Refills: 1 | Status: SHIPPED | OUTPATIENT
Start: 2023-04-27

## 2023-05-02 ASSESSMENT — ENCOUNTER SYMPTOMS
STIFFNESS: 1
BLOOD IN STOOL: 0
MUSCLE WEAKNESS: 1
BLOATING: 0
BOWEL INCONTINENCE: 0
MUSCLE CRAMPS: 1
VOMITING: 0
NAUSEA: 0
JAUNDICE: 0
DIARRHEA: 1
HEARTBURN: 0
CONSTIPATION: 0
BACK PAIN: 1
NECK PAIN: 1
MYALGIAS: 1
ARTHRALGIAS: 1
RECTAL PAIN: 0
NAIL CHANGES: 0
ABDOMINAL PAIN: 0
POOR WOUND HEALING: 0
JOINT SWELLING: 1
SKIN CHANGES: 0

## 2023-05-03 ENCOUNTER — OFFICE VISIT (OUTPATIENT)
Dept: DERMATOLOGY | Facility: CLINIC | Age: 58
End: 2023-05-03
Attending: INTERNAL MEDICINE
Payer: COMMERCIAL

## 2023-05-03 DIAGNOSIS — L82.0 INFLAMED SEBORRHEIC KERATOSIS: ICD-10-CM

## 2023-05-03 DIAGNOSIS — Z79.60 LONG-TERM USE OF IMMUNOSUPPRESSANT MEDICATION: ICD-10-CM

## 2023-05-03 DIAGNOSIS — L57.0 ACTINIC KERATOSIS: ICD-10-CM

## 2023-05-03 DIAGNOSIS — L29.9 ITCHING: ICD-10-CM

## 2023-05-03 DIAGNOSIS — L30.0 NUMMULAR DERMATITIS: Primary | ICD-10-CM

## 2023-05-03 DIAGNOSIS — L70.0 ACNE VULGARIS: ICD-10-CM

## 2023-05-03 DIAGNOSIS — L82.1 SEBORRHEIC KERATOSIS: ICD-10-CM

## 2023-05-03 DIAGNOSIS — L81.4 SOLAR LENTIGO: ICD-10-CM

## 2023-05-03 DIAGNOSIS — D22.9 MULTIPLE BENIGN NEVI: ICD-10-CM

## 2023-05-03 DIAGNOSIS — Z94.4 LIVER REPLACED BY TRANSPLANT (H): ICD-10-CM

## 2023-05-03 PROCEDURE — 17000 DESTRUCT PREMALG LESION: CPT | Mod: 59 | Performed by: DERMATOLOGY

## 2023-05-03 PROCEDURE — 17110 DESTRUCTION B9 LES UP TO 14: CPT | Mod: GC | Performed by: DERMATOLOGY

## 2023-05-03 PROCEDURE — 99214 OFFICE O/P EST MOD 30 MIN: CPT | Mod: 25 | Performed by: DERMATOLOGY

## 2023-05-03 RX ORDER — TRETINOIN 0.25 MG/G
CREAM TOPICAL
Qty: 45 G | Refills: 11 | Status: SHIPPED | OUTPATIENT
Start: 2023-05-03 | End: 2023-08-16

## 2023-05-03 RX ORDER — BETAMETHASONE DIPROPIONATE 0.5 MG/G
OINTMENT, AUGMENTED TOPICAL
Qty: 45 G | Refills: 11 | Status: SHIPPED | OUTPATIENT
Start: 2023-05-03

## 2023-05-03 ASSESSMENT — PAIN SCALES - GENERAL: PAINLEVEL: NO PAIN (0)

## 2023-05-03 NOTE — LETTER
5/3/2023       RE: Camacho Bhagat  [address]  [facility] [address]     Dear Colleague,    Thank you for referring your patient, Camacho Bhagat, to the [facility] DERMATOLOG[facility] [place] at [facility]. Please see a copy of my visit note below.    Formerly [facility] Dermatology Note  Encounter Date: May 3, 2023  Office Visit     Dermatology Problem List:  1. History of liver transplant 3/4/17  -tacrolimus, MMF, prednisone  SH: Works as a CMA.   ____________________________________________    Assessment & Plan:    # History of  Liver solid organ transplantation in the setting of CASTAÑEDA and hepatocellular carcinoma.   - We reviewed with the patient that due to the immunosuppressive medications used following transplant, solid organ transplant recipients are at a roughly 65-fold increased risk of squamous cell carcinoma, 20-fold increased risk of basal cell carcinoma, and 3.4 fold increased risk of melanoma compared to the general population.   - We briefly reviewed prevention methods for reducing skin cancer after transplantation including avoidance of sun exposure, avoidance of indoor tanning beds or other indoor tanning devices, use of protective clothing (e.g. wide-brimmed hats, long sleeves, long pants), SPF 30 or greater sunscreen, and UV-protective sunglasses when outdoors.   - We discussed self skin examinations and partner-skin examinations.      # Actinic keratosis. Frontal scalp x 1  # Seborrhoic keratosis, inflamed. R forearm x 1.   AK identified on front scalp and SK on the right forearm, treated with cryotherapy. No other areas of concern.    # Nummular eczema. Chronic, flare.    Pruritic, red, and scaly patches noted on the medial R and L ankle. PCP prescribed Kenalog and hydroxyzine, both of which have helped. Will start a higher potency steroid, Diprolene-AF 0.05% ointment to apply to the areas twice a day as needed.  -  Diprolene 0.05% external ointment BID prn    # Acne vulgaris. Chronic, flare.   Patient notes intermittent flares of acne located over his chin, nose, and forehead. Will start tretinoin 0.025% nightly. Discussed starting every other night, moisturizer use, and reinforced sunscreen use.  - start tretinoin 0.025% nightly    Procedures Performed:   - Cryotherapy procedure note, location(s): front of scalp, right forearm. After verbal consent and discussion of risks and benefits including, but not limited to, dyspigmentation/scar, blister, and pain, 1 AK and 1 inflamed SK lesion(s) was(were) treated with 1-2 mm freeze border for 1-2 cycles with liquid nitrogen. Post cryotherapy instructions were provided.  None    Follow-up: 3 month(s) in-person, or earlier for new or changing lesions    Staff, resident and Scribe:     Debby Shah MD PGY2  [facility]     Scribe Disclosure:  I, Claude Stinson, am serving as a scribe to document services personally performed by Jesse Gomez MD based on data collection and the provider's statements to me.     Provider Disclosure:   The documentation recorded by the scribe accurately reflects the services I personally performed and the decisions made by me.    Staff Physician Comments:   I saw and evaluated the patient with the resident and I agree with the assessment and plan.  I was present for the entire minor procedure and examination.    Jesse Gomez MD  Pronouns: he/him/his    Department of Dermatology  [facility]: Phone: [phone], Fax:[phone]  [facility]: Phone: [phone] Fax: [phone]  ___________________________________________    CC: Skin Check (Patient reports new itching on bilateral lower legs. Patient reports use of kenalog cream with some improvement. Patient would like a full body skin check.  )    HPI:  Mr. Camacho Bhagat is a(n) 57 year old male with a history of liver transplant in 2017 (HCC, CASTAÑEDA) who presents today as a return patient for routine skin check. Last seen in dermatology by me on 2/9/2022, at which time patient received cryotherapy for treatment of an ISK on the L lateral posterior neck.    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    Labs Reviewed:  N/A    Physical Exam:  Vitals: There were no vitals taken for this visit.   Lesion on head, right arm, bilateral medial ankles  SKIN: Full skin, which includes the head/face, both arms, chest, back, abdomen,both legs, genitalia and/or groin buttocks, digits and/or nails, was examined.  - There is a tan to pink waxy stuck on papule on the R forearm.  - There is an erythematous macule with overyling adherent scale on the anterior scalp.   - Scattered brown macules on sun exposed areas.  - Multiple regular brown pigmented macules and papules on the trunk and extremities.  - Multiple tan to brown waxy, stuck on papules on the neck, back and trunk   - There are superifical acneiform papules with intermixed open and closed comedones on the nose and chin.   - Red, scaly, nummular patch noted in the medal R and L ankle.  - No other lesions of concern on areas  examined.     Medications:  Current Outpatient Medications   Medication    alcohol swab prep pads    alpha-lipoic acid 600 MG capsule    amLODIPine (NORVASC) 10 MG tablet    augmented betamethasone dipropionate (DIPROLENE-AF) 0.05 % external ointment    blood glucose (NO BRAND SPECIFIED) lancets standard    blood glucose (NO BRAND SPECIFIED) test strip    blood glucose calibration (NO BRAND SPECIFIED) solution    Blood Glucose Monitoring Suppl (CONTOUR BLOOD GLUCOSE SYSTEM) w/Device KIT    cholecalciferol (VITAMIN D3) 1000 UNIT tablet    Continuous Blood Gluc Sensor (DEXCOM G6 SENSOR) MISC    Continuous Blood Gluc Transmit (DEXCOM G6 TRANSMITTER) MISC    cyclobenzaprine (FLEXERIL) 10 MG tablet    doxazosin (CARDURA) 2 MG tablet    fluticasone (FLONASE) 50 MCG/ACT nasal spray    furosemide (LASIX) 40 MG tablet    gabapentin (NEURONTIN) 300 MG capsule    Glucose Blood (BLOOD GLUCOSE TEST STRIPS) STRP    hydrOXYzine (ATARAX) 25 MG tablet    Insulin Disposable Pump (OMNIPOD 5 G6 POD, GEN 5,) MISC    Insulin Disposable Pump (OMNIPOD 5 G6 POD, GEN 5,) MISC    Insulin Lispro (HUMALOG KWIKPEN) 200 UNIT/ML soln    insulin pen needle (BD ENE U/F) 32G X 4 MM miscellaneous    lidocaine (LIDODERM) 5 % patch    losartan (COZAAR) 25 MG tablet    metoprolol succinate ER (TOPROL XL) 200 MG 24 hr tablet    mycophenolic acid (GENERIC EQUIVALENT) 360 MG EC tablet    oxyCODONE (ROXICODONE) 5 MG tablet    patiromer (VELTASSA) 8.4 g packet    predniSONE (DELTASONE) 2.5 MG tablet    sildenafil (REVATIO) 20 MG tablet    tacrolimus (GENERIC EQUIVALENT) 1 MG capsule    thin (NO BRAND SPECIFIED) lancets    tretinoin (RETIN-A) 0.025 % external cream    triamcinolone (KENALOG) 0.1 % external ointment    ursodiol (ACTIGALL) 500 MG tablet     Current Facility-Administered Medications   Medication    lidocaine (PF) (XYLOCAINE) 1 % injection 3 mL    lidocaine (PF) (XYLOCAINE) 1 % injection 3 mL    triamcinolone (KENALOG-40) injection 40 mg     triamcinolone (KENALOG-40) injection 40 mg      Past Medical History:   Patient Active Problem List   Diagnosis    Carpal tunnel syndrome    Testicular hypofunction    Fibromyalgia    Sicca syndrome (H)    Medication refill- do not delete     Hepatocellular carcinoma (H)    Osteoarthritis    Rheumatoid arthritis (H)    Hyperkalemia    Liver transplant recipient (H)    Immunosuppressed status (H)    Neutropenic colitis (H)    S/P liver transplant (H)    Pancytopenia (H)    Ischemia of both lower extremities    Elevated serum creatinine    Peristomal skin complication    Painful periwound skin    Abscess, intra-abdominal, postoperative    Liver transplant rejection (H)    CMV colitis (H)    Type 2 diabetes mellitus with diabetic polyneuropathy, with long-term current use of insulin (H)    Diarrhea of infectious origin (Plesiomonas shigelloides (formerly known Aeromonas shigelloides) in 3/14/2019 sample)    Hypertension secondary to other renal disorders    Chronic kidney disease, stage 3 (H)    Morbid obesity (H)     Past Medical History:   Diagnosis Date    Depressive disorder, not elsewhere classified     Diabetes type 2, controlled (H) 11/10/2016    Esophageal reflux     Fibromyalgia 1/2009    dx with Dr Benitez( Rheum)    Gangrene of finger (H) 8/25/2017    H/O deep venous thrombosis 11/2001    Permanent IVC filter in place.    H/O CASTAÑEDA (nonalcoholic steatohepatitis)     H/O Pneumonia, organism unspecified(486) 10/2001    Included ARDS, sepsis, and  acute renal failure; hospitalized, required tracheostomy placement.    H/O: HTN (hypertension) 11/2001    No longer prescribed antihypertensive medication.      History of CVA (cerebrovascular accident)     History of hepatocellular carcinoma     History of liver transplant (H)     History of obstructive sleep apnea     No longer wears CPAP since losing approximately 200 pounds with his liver transplant and its complications.      HLD (hyperlipidemia)     Ischemia of  both lower extremities 8/25/2017    Distal ischemia due to shock/high pressor requirements    Liver transplant rejection (H) 6/11/2018    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    Osteoarthritis     Presence of PERMANENT IVC filter     Rheumatoid arthritis(714.0)         CC Toñito Camejo MD  [address] [place] [id number] on close of this encounter.

## 2023-05-03 NOTE — NURSING NOTE
Dermatology Rooming Note    Camacho Bhagat's goals for this visit include:   Chief Complaint   Patient presents with     Skin Check     Patient reports new itching on bilateral lower legs. Patient reports use of kenalog cream with some improvement. Patient would like a full body skin check.      Lu Vernon LPN

## 2023-05-03 NOTE — PROGRESS NOTES
[facility] Dermatology Note  Encounter Date: May 3, 2023  Office Visit     Dermatology Problem List:  1. History of liver transplant 3/4/17  -tacrolimus, MMF, prednisone  SH: Works as a CMA.   ____________________________________________    Assessment & Plan:    # History of  Liver solid organ transplantation in the setting of CASTAÑEDA and hepatocellular carcinoma.   - We reviewed with the patient that due to the immunosuppressive medications used following transplant, solid organ transplant recipients are at a roughly 65-fold increased risk of squamous cell carcinoma, 20-fold increased risk of basal cell carcinoma, and 3.4 fold increased risk of melanoma compared to the general population.   - We briefly reviewed prevention methods for reducing skin cancer after transplantation including avoidance of sun exposure, avoidance of indoor tanning beds or other indoor tanning devices, use of protective clothing (e.g. wide-brimmed hats, long sleeves, long pants), SPF 30 or greater sunscreen, and UV-protective sunglasses when outdoors.   - We discussed self skin examinations and partner-skin examinations.      # Actinic keratosis. Frontal scalp x 1  # Seborrhoic keratosis, inflamed. R forearm x 1.   AK identified on front scalp and SK on the right forearm, treated with cryotherapy. No other areas of concern.    # Nummular eczema. Chronic, flare.    Pruritic, red, and scaly patches noted on the medial R and L ankle. PCP prescribed Kenalog and hydroxyzine, both of which have helped. Will start a higher potency steroid, Diprolene-AF 0.05% ointment to apply to the areas twice a day as needed.  - Diprolene 0.05% external ointment BID prn    # Acne vulgaris. Chronic, flare.   Patient notes intermittent flares of acne located over his chin, nose, and forehead. Will start tretinoin 0.025% nightly. Discussed starting every other night, moisturizer use, and reinforced sunscreen use.  - start tretinoin 0.025%  nightly    Procedures Performed:   - Cryotherapy procedure note, location(s): front of scalp, right forearm. After verbal consent and discussion of risks and benefits including, but not limited to, dyspigmentation/scar, blister, and pain, 1 AK and 1 inflamed SK lesion(s) was(were) treated with 1-2 mm freeze border for 1-2 cycles with liquid nitrogen. Post cryotherapy instructions were provided.  None    Follow-up: 3 month(s) in-person, or earlier for new or changing lesions    Staff, resident and Scribe:     Debby Shah MD PGY2  [facility]     Scribe Disclosure:  I, Claude Stinson, am serving as a scribe to document services personally performed by Jesse Gomez MD based on data collection and the provider's statements to me.     Provider Disclosure:   The documentation recorded by the scribe accurately reflects the services I personally performed and the decisions made by me.    Staff Physician Comments:   I saw and evaluated the patient with the resident and I agree with the assessment and plan.  I was present for the entire minor procedure and examination.    Jesse Gomez MD  Pronouns: he/him/his    Department of Dermatology  [facility]: Phone: [phone], Fax:[phone]  [facility]: Phone: [phone] Fax: [phone]  ___________________________________________    CC: Skin Check (Patient reports new itching on bilateral lower legs. Patient reports use of kenalog cream with some improvement. Patient would like a full body skin check. )    HPI:  Mr. Camacho Bhagat is a(n) 57 year old male with a history of liver transplant in 2017 (HCC, CASTAÑEDA) who presents today as a return patient for routine skin check. Last seen in dermatology by me on 2/9/2022, at which time patient received cryotherapy for treatment of an ISK on the L lateral posterior  neck.    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    Labs Reviewed:  N/A    Physical Exam:  Vitals: There were no vitals taken for this visit.   Lesion on head, right arm, bilateral medial ankles  SKIN: Full skin, which includes the head/face, both arms, chest, back, abdomen,both legs, genitalia and/or groin buttocks, digits and/or nails, was examined.  - There is a tan to pink waxy stuck on papule on the R forearm.  - There is an erythematous macule with overyling adherent scale on the anterior scalp.   - Scattered brown macules on sun exposed areas.  - Multiple regular brown pigmented macules and papules on the trunk and extremities.  - Multiple tan to brown waxy, stuck on papules on the neck, back and trunk   - There are superifical acneiform papules with intermixed open and closed comedones on the nose and chin.   - Red, scaly, nummular patch noted in the medal R and L ankle.  - No other lesions of concern on areas examined.     Medications:  Current Outpatient Medications   Medication     alcohol swab prep pads     alpha-lipoic acid 600 MG capsule     amLODIPine (NORVASC) 10 MG tablet     augmented betamethasone dipropionate (DIPROLENE-AF) 0.05 % external ointment     blood glucose (NO BRAND SPECIFIED) lancets standard      blood glucose (NO BRAND SPECIFIED) test strip     blood glucose calibration (NO BRAND SPECIFIED) solution     Blood Glucose Monitoring Suppl (CONTOUR BLOOD GLUCOSE SYSTEM) w/Device KIT     cholecalciferol (VITAMIN D3) 1000 UNIT tablet     Continuous Blood Gluc Sensor (DEXCOM G6 SENSOR) MISC     Continuous Blood Gluc Transmit (DEXCOM G6 TRANSMITTER) MISC     cyclobenzaprine (FLEXERIL) 10 MG tablet     doxazosin (CARDURA) 2 MG tablet     fluticasone (FLONASE) 50 MCG/ACT nasal spray     furosemide (LASIX) 40 MG tablet     gabapentin (NEURONTIN) 300 MG capsule     Glucose Blood (BLOOD GLUCOSE TEST STRIPS) STRP     hydrOXYzine (ATARAX) 25 MG tablet     Insulin Disposable Pump (OMNIPOD 5 G6 POD, GEN 5,) MISC     Insulin Disposable Pump (OMNIPOD 5 G6 POD, GEN 5,) MISC     Insulin Lispro (HUMALOG KWIKPEN) 200 UNIT/ML soln     insulin pen needle (BD ENE U/F) 32G X 4 MM miscellaneous     lidocaine (LIDODERM) 5 % patch     losartan (COZAAR) 25 MG tablet     metoprolol succinate ER (TOPROL XL) 200 MG 24 hr tablet     mycophenolic acid (GENERIC EQUIVALENT) 360 MG EC tablet     oxyCODONE (ROXICODONE) 5 MG tablet     patiromer (VELTASSA) 8.4 g packet     predniSONE (DELTASONE) 2.5 MG tablet     sildenafil (REVATIO) 20 MG tablet     tacrolimus (GENERIC EQUIVALENT) 1 MG capsule     thin (NO BRAND SPECIFIED) lancets     tretinoin (RETIN-A) 0.025 % external cream     triamcinolone (KENALOG) 0.1 % external ointment     ursodiol (ACTIGALL) 500 MG tablet     Current Facility-Administered Medications   Medication     lidocaine (PF) (XYLOCAINE) 1 % injection 3 mL     lidocaine (PF) (XYLOCAINE) 1 % injection 3 mL     triamcinolone (KENALOG-40) injection 40 mg     triamcinolone (KENALOG-40) injection 40 mg      Past Medical History:   Patient Active Problem List   Diagnosis     Carpal tunnel syndrome     Testicular hypofunction     Fibromyalgia     Sicca syndrome (H)     Medication refill- do not delete      Hepatocellular carcinoma (H)      Osteoarthritis     Rheumatoid arthritis (H)     Hyperkalemia     Liver transplant recipient (H)     Immunosuppressed status (H)     Neutropenic colitis (H)     S/P liver transplant (H)     Pancytopenia (H)     Ischemia of both lower extremities     Elevated serum creatinine     Peristomal skin complication     Painful periwound skin     Abscess, intra-abdominal, postoperative     Liver transplant rejection (H)     CMV colitis (H)     Type 2 diabetes mellitus with diabetic polyneuropathy, with long-term current use of insulin (H)     Diarrhea of infectious origin (Plesiomonas shigelloides (formerly known Aeromonas shigelloides) in 3/14/2019 sample)     Hypertension secondary to other renal disorders     Chronic kidney disease, stage 3 (H)     Morbid obesity (H)     Past Medical History:   Diagnosis Date     Depressive disorder, not elsewhere classified      Diabetes type 2, controlled (H) 11/10/2016     Esophageal reflux      Fibromyalgia 1/2009    dx with Dr Benitez( Rheum)     Gangrene of finger (H) 8/25/2017     H/O deep venous thrombosis 11/2001    Permanent IVC filter in place.     H/O CASTAÑEDA (nonalcoholic steatohepatitis)      H/O Pneumonia, organism unspecified(486) 10/2001    Included ARDS, sepsis, and  acute renal failure; hospitalized, required tracheostomy placement.     H/O: HTN (hypertension) 11/2001    No longer prescribed antihypertensive medication.       History of CVA (cerebrovascular accident)      History of hepatocellular carcinoma      History of liver transplant (H)      History of obstructive sleep apnea     No longer wears CPAP since losing approximately 200 pounds with his liver transplant and its complications.       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     Osteoarthritis      Presence of PERMANENT IVC filter       Rheumatoid arthritis(714.0)         CC Toñito Camejo MD  [address] [place] [address] on close of this encounter.

## 2023-05-04 ENCOUNTER — HOSPITAL ENCOUNTER (OUTPATIENT)
Dept: GENERAL RADIOLOGY | Facility: CLINIC | Age: 58
Discharge: HOME | End: 2023-05-04
Attending: RADIOLOGY
Payer: COMMERCIAL

## 2023-05-04 ENCOUNTER — HOSPITAL ENCOUNTER (OUTPATIENT)
Dept: MRI IMAGING | Facility: CLINIC | Age: 58
Discharge: HOME | End: 2023-05-04
Attending: INTERNAL MEDICINE
Payer: COMMERCIAL

## 2023-05-04 DIAGNOSIS — K52.9 CHRONIC DIARRHEA OF UNKNOWN ORIGIN: ICD-10-CM

## 2023-05-04 DIAGNOSIS — Z01.89 ENCOUNTER FOR IMAGING TO SCREEN FOR METAL PRIOR TO MAGNETIC RESONANCE IMAGING (MRI): ICD-10-CM

## 2023-05-04 PROCEDURE — A9585 GADOBUTROL INJECTION: HCPCS | Performed by: INTERNAL MEDICINE

## 2023-05-04 PROCEDURE — 250N000011 HC RX IP 250 OP 636: Performed by: INTERNAL MEDICINE

## 2023-05-04 PROCEDURE — 70030 X-RAY EYE FOR FOREIGN BODY: CPT

## 2023-05-04 PROCEDURE — 255N000002 HC RX 255 OP 636: Performed by: INTERNAL MEDICINE

## 2023-05-04 PROCEDURE — 74183 MRI ABD W/O CNTR FLWD CNTR: CPT

## 2023-05-04 RX ORDER — GADOBUTROL 604.72 MG/ML
13 INJECTION INTRAVENOUS ONCE
Status: COMPLETED | OUTPATIENT
Start: 2023-05-04 | End: 2023-05-04

## 2023-05-04 RX ADMIN — GLUCAGON 1 MG: KIT at 15:33

## 2023-05-04 RX ADMIN — GADOBUTROL 13 ML: 604.72 INJECTION INTRAVENOUS at 15:34

## 2023-05-05 DIAGNOSIS — I10 HTN (HYPERTENSION): ICD-10-CM

## 2023-05-05 RX ORDER — AMLODIPINE BESYLATE 10 MG/1
10 TABLET ORAL DAILY
Qty: 90 TABLET | Refills: 3 | Status: SHIPPED | OUTPATIENT
Start: 2023-05-05 | End: 2023-08-11

## 2023-05-09 ENCOUNTER — DOCUMENTATION ONLY (OUTPATIENT)
Dept: PODIATRY | Facility: CLINIC | Age: 58
End: 2023-05-09

## 2023-05-09 ENCOUNTER — OFFICE VISIT (OUTPATIENT)
Dept: ENDOCRINOLOGY | Facility: CLINIC | Age: 58
End: 2023-05-09
Payer: COMMERCIAL

## 2023-05-09 VITALS
BODY MASS INDEX: 39.96 KG/M2 | DIASTOLIC BLOOD PRESSURE: 76 MMHG | SYSTOLIC BLOOD PRESSURE: 150 MMHG | OXYGEN SATURATION: 99 % | HEIGHT: 72 IN | TEMPERATURE: 97.9 F | WEIGHT: 295 LBS | HEART RATE: 54 BPM | RESPIRATION RATE: 18 BRPM

## 2023-05-09 DIAGNOSIS — Z79.4 TYPE 2 DIABETES MELLITUS WITH DIABETIC POLYNEUROPATHY, WITH LONG-TERM CURRENT USE OF INSULIN (H): ICD-10-CM

## 2023-05-09 DIAGNOSIS — E11.21 TYPE 2 DIABETES MELLITUS WITH DIABETIC NEPHROPATHY, WITH LONG-TERM CURRENT USE OF INSULIN (H): ICD-10-CM

## 2023-05-09 DIAGNOSIS — G62.9 POLYNEUROPATHY, UNSPECIFIED: ICD-10-CM

## 2023-05-09 DIAGNOSIS — E66.01 MORBID OBESITY (H): ICD-10-CM

## 2023-05-09 DIAGNOSIS — N18.31 CHRONIC KIDNEY DISEASE, STAGE 3A (H): ICD-10-CM

## 2023-05-09 DIAGNOSIS — Z79.4 TYPE 2 DIABETES MELLITUS WITH DIABETIC NEPHROPATHY, WITH LONG-TERM CURRENT USE OF INSULIN (H): ICD-10-CM

## 2023-05-09 DIAGNOSIS — Z79.4 TYPE 2 DIABETES MELLITUS WITH DIABETIC POLYNEUROPATHY, WITH LONG-TERM CURRENT USE OF INSULIN (H): Primary | ICD-10-CM

## 2023-05-09 DIAGNOSIS — E11.42 TYPE 2 DIABETES MELLITUS WITH DIABETIC POLYNEUROPATHY, WITH LONG-TERM CURRENT USE OF INSULIN (H): ICD-10-CM

## 2023-05-09 DIAGNOSIS — E11.42 TYPE 2 DIABETES MELLITUS WITH DIABETIC POLYNEUROPATHY, WITH LONG-TERM CURRENT USE OF INSULIN (H): Primary | ICD-10-CM

## 2023-05-09 PROCEDURE — 99214 OFFICE O/P EST MOD 30 MIN: CPT | Performed by: PHYSICIAN ASSISTANT

## 2023-05-09 RX ORDER — INSULIN LISPRO 200 [IU]/ML
INJECTION, SOLUTION SUBCUTANEOUS
Qty: 72 ML | Refills: 3 | Status: SHIPPED | OUTPATIENT
Start: 2023-05-09 | End: 2024-02-04

## 2023-05-09 RX ORDER — PROCHLORPERAZINE 25 MG/1
SUPPOSITORY RECTAL
Qty: 1 EACH | Refills: 3 | Status: SHIPPED | OUTPATIENT
Start: 2023-05-09 | End: 2024-02-05

## 2023-05-09 RX ORDER — PROCHLORPERAZINE 25 MG/1
SUPPOSITORY RECTAL
Qty: 9 EACH | Refills: 3 | Status: SHIPPED | OUTPATIENT
Start: 2023-05-09 | End: 2024-02-05

## 2023-05-09 RX ORDER — INSULIN PMP CART,AUT,G6/7,CNTR
1 EACH SUBCUTANEOUS SEE ADMIN INSTRUCTIONS
Qty: 45 EACH | Refills: 3 | Status: SHIPPED | OUTPATIENT
Start: 2023-05-09 | End: 2024-02-05

## 2023-05-09 NOTE — PROGRESS NOTES
Assessment/Plan :   1. Type 2 DM with long term use of insulin. Camacho is doing well. He has not had any problems with severe hyperglycemia and/or hypoglycemia. He is overdue for a repeat A1C. He has labs drawn, routinely, and he was shocked that the hemoglobin A1C standing order seemed to disappear. I placed another standing order today. We discussed the differences between the Omnipod Dash and the Omnipod 5. We will send in a new prescription for the 5, today. If he has any problems filling the prescription, he will contact our office. We also discussed setting up a CDE appt in order to get started on the 5 but he would like to set it up, himself. Again, if he has any problems, he is to contact our office.    We will plan on a follow-up appt with Dr. West in about 6 mos. I will contact him with the laboratory results, once he has the A1C drawn.    2. Neuropathy. He had called our office a couple weeks ago regarding worsening neuropathy in his legs. He has since seen the orthopedist and it appears that the neuropathy was related to arthritis in his back. He is going to let them manage the neuropathic pain, for now.      I have independently reviewed and interpreted labs, imaging as indicated.      Chief complaint:  Camacho is a 57 year old male who returns for follow-up of Type 2 DM with long term use of insulin.    I have reviewed Care Everywhere including [facility], [facility], [facility], [facility], [facility], [facility] , [facility], [facility] lab reports, imaging reports and provider notes as indicated.      HISTORY OF PRESENT ILLNESS  Camacho was diagnosed with diabetes around 2002. He was originally started on oral medications but due to ongoing hyperglycemia, he was eventually switched to insulin therapy. He has been using an Omnipod insulin pump for the last few years, along with the Dexcom sensor. He feels like the combination is working well. He currently uses the Omnipod Dash but he would  "like to upgrade to the Omnipod 5, if possible.    His diabetes is complicated by history of CASTAÑEDA/HCC s/p liver transplant, chronic kidney disease, HTN, RONNIE, osteoarthritis, neuropathy, and proteinuria. He is currently followed by nephrology, gastroenterology, and orthopedics with routine laboratory testing every 2 mos.    Since the last endocrinology visit, he developed worsening neuropathy. He wanted to look into options regarding his gabapentin dosing. However, he saw the orthopedist and received a epidural steroid injection, which relieved all neuropathic pain. The steroid did cause some hyperglycemia and he tried to adjust his insulin accordingly. His blood sugars are now back to \"normal.\" He has not had any episodes of severe hypoglycemia. He also denies any worsening vision. His current pump settings remain stable and are as follows:    Basal rate   12 AM 1.5 units/h  3 AM 1.45 units/h  2 PM 1.4 units/h   6 PM 1.45 units/h   9 PM 1.5 units/h  Insulin to carbohydrate ratio 1 unit per 7.5 g   Sensitivity 25   Blood glucose correction threshold 125  Glucose target range 100  Minimum BG for bolus calculation 70  He uses Humalog 200 pens to fill the pump reservoir.     Endocrine relevant labs are as follows:   Latest Reference Range & Units 09/27/22 08:41   Hemoglobin A1C 0.0 - 5.6 % 6.0 (H)   (H): Data is abnormally high    REVIEW OF SYSTEMS  Answers for HPI/ROS submitted by the patient on 5/2/2023  General Symptoms: No  Skin Symptoms: Yes  HENT Symptoms: No  EYE SYMPTOMS: No  HEART SYMPTOMS: No  LUNG SYMPTOMS: No  INTESTINAL SYMPTOMS: Yes  URINARY SYMPTOMS: No  REPRODUCTIVE SYMPTOMS: Yes  SKELETAL SYMPTOMS: Yes  BLOOD SYMPTOMS: No  NERVOUS SYSTEM SYMPTOMS: No  MENTAL HEALTH SYMPTOMS: No  Changes in hair: No  Changes in moles/birth marks: No  Itching: Yes  Rashes: No  Changes in nails: No  Acne: No  Change in facial hair: No  Warts: No  Non-healing sores: No  Scarring: No  Flaking of skin: No  Color changes of " hands/feet in cold : No  Sun sensitivity: No  Skin thickening: No  Heart burn or indigestion: No  Nausea: No  Vomiting: No  Abdominal pain: No  Bloating: No  Constipation: No  Diarrhea: Yes  Blood in stool: No  Black stools: No  Rectal or Anal pain: No  Fecal incontinence: No  Yellowing of skin or eyes: No  Vomit with blood: No  Change in stools: No  Scrotal pain or swelling: No  Erectile dysfunction: Yes  Penile discharge: No  Genital ulcers: No  Reduced libido: No  Back pain: Yes  Muscle aches: Yes  Neck pain: Yes  Swollen joints: Yes  Joint pain: Yes  Bone pain: No  Muscle cramps: Yes  Muscle weakness: Yes  Joint stiffness: Yes  Bone fracture: No    Endocrine: positive for diabetes  Skin: negative  Eyes: negative for, visual blurring  Ears/Nose/Throat: negative  Respiratory: No shortness of breath, dyspnea on exertion, cough, or hemoptysis  Cardiovascular: negative for, chest pain, dyspnea on exertion and exercise intolerance  Gastrointestinal: negative for, nausea, vomiting, constipation and diarrhea  Genitourinary: negative for, nocturia, dysuria, frequency and urgency  Musculoskeletal: positive for muscular weakness, negative for and nocturnal cramping  Neurologic: positive for numbness or tingling of feet (this has improved with epidural)  Psychiatric: negative  Hematologic/Lymphatic/Immunologic: positive for anemia but he was recently told that his liver shows signs of excess iron    Past Medical History  Past Medical History:   Diagnosis Date     Depressive disorder, not elsewhere classified      Diabetes type 2, controlled (H) 11/10/2016     Esophageal reflux      Fibromyalgia 1/2009    dx with Dr Benitez( Rheum)     Gangrene of finger (H) 8/25/2017     H/O deep venous thrombosis 11/2001    Permanent IVC filter in place.     H/O CASTAÑEDA (nonalcoholic steatohepatitis)      H/O Pneumonia, organism unspecified(486) 10/2001    Included ARDS, sepsis, and  acute renal failure; hospitalized, required tracheostomy  placement.     H/O: HTN (hypertension) 11/2001    No longer prescribed antihypertensive medication.       History of CVA (cerebrovascular accident)      History of hepatocellular carcinoma      History of liver transplant (H)      History of obstructive sleep apnea     No longer wears CPAP since losing approximately 200 pounds with his liver transplant and its complications.       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     Osteoarthritis      Presence of PERMANENT IVC filter      Rheumatoid arthritis(714.0)        Medications  Current Outpatient Medications   Medication Sig Dispense Refill     alcohol swab prep pads Use to swab area of injection/francisca as directed. 100 each 3     alpha-lipoic acid 600 MG capsule Take 600 mg by mouth       amLODIPine (NORVASC) 10 MG tablet Take 1 tablet (10 mg) by mouth daily 90 tablet 3     augmented betamethasone dipropionate (DIPROLENE-AF) 0.05 % external ointment Apply twice daily as needed for rash on the legs 45 g 11     blood glucose (NO BRAND SPECIFIED) lancets standard Use to test blood sugar 1 times daily or as directed. 100 lancet 3     blood glucose (NO BRAND SPECIFIED) test strip Use to test blood sugar 1 times daily or as directed. 50 strip 11     blood glucose calibration (NO BRAND SPECIFIED) solution Use to calibrate meter. 1 Bottle 3     Blood Glucose Monitoring Suppl (CONTOUR BLOOD GLUCOSE SYSTEM) w/Device KIT For use with the Omnipod Dash insulin pump 1 kit 1     cholecalciferol (VITAMIN D3) 1000 UNIT tablet Take 1 tablet (1,000 Units) by mouth daily (Patient taking differently: Take 1,000 Units by mouth every morning) 100 tablet 3     Continuous Blood Gluc Sensor (DEXCOM G6 SENSOR) MISC Change every 10 days. 9 each 3     Continuous Blood Gluc Transmit (DEXCOM G6 TRANSMITTER) MISC Change every 3  months. 1 each 3     cyclobenzaprine (FLEXERIL) 10 MG tablet Take 1 tablet (10 mg) by mouth 3 times daily as needed for muscle spasms 90 tablet 3     doxazosin (CARDURA) 2 MG tablet Take 3 tablets (6 mg) by mouth At Bedtime 270 tablet 3     fluticasone (FLONASE) 50 MCG/ACT nasal spray Spray 1 spray into both nostrils daily 20 mL 3     furosemide (LASIX) 40 MG tablet Take 60 mg AM and 40 mg afternoon 180 tablet 3     gabapentin (NEURONTIN) 300 MG capsule Take 2 capsules (600 mg) by mouth 3 times daily 540 capsule 3     hydrOXYzine (ATARAX) 25 MG tablet Take 1 tablet (25 mg) by mouth 3 times daily as needed for itching 30 tablet 1     Insulin Disposable Pump (OMNIPOD 5 G6 POD, GEN 5,) MISC 1 each See Admin Instructions Change every 2 days. Use for insulin administration. 45 each 3     Insulin Disposable Pump (OMNIPOD 5 G6 POD, GEN 5,) MISC 1 each See Admin Instructions Use per 's instructions. 1 each 1     Insulin Lispro (HUMALOG KWIKPEN) 200 UNIT/ML soln To be used in the insulin pump. Total daily dose up to 170 U. 72 mL 3     insulin pen needle (BD ENE U/F) 32G X 4 MM miscellaneous Use 1 pen needle 4 times daily or as directed. 400 each 1     lidocaine (LIDODERM) 5 % patch Place 1 patch onto the skin every 24 hours To prevent lidocaine toxicity, patient should be patch free for 12 hrs daily. 30 patch 11     losartan (COZAAR) 25 MG tablet Take 1 tablet (25 mg) by mouth daily 90 tablet 3     metoprolol succinate ER (TOPROL XL) 200 MG 24 hr tablet Take 1 tablet (200 mg) by mouth daily 90 tablet 3     mycophenolic acid (GENERIC EQUIVALENT) 360 MG EC tablet Take 2 tablets (720 mg) by mouth every 12 hours 360 tablet 3     oxyCODONE (ROXICODONE) 5 MG tablet Take 1 tablet (5 mg) by mouth 4 times daily as needed for pain maximum 6 tablet(s) per day 30 tablet 0     patiromer (VELTASSA) 8.4 g packet Take 8.4 g by mouth daily 90 each 3     predniSONE (DELTASONE) 2.5 MG tablet Take 1 tablet (2.5 mg) by mouth daily  "90 tablet 3     sildenafil (REVATIO) 20 MG tablet Take 2 tablets (40 mg) by mouth daily as needed (erectile dysfunction) 10 tablet 11     tacrolimus (GENERIC EQUIVALENT) 1 MG capsule Take 4 capsules (4 mg) by mouth every morning AND 3 capsules (3 mg) every evening. 630 capsule 3     tretinoin (RETIN-A) 0.025 % external cream Apply a pea-sized amount evenly over the face at nighttime before bed 45 g 11     triamcinolone (KENALOG) 0.1 % external ointment Apply topically 2 times daily to the itching on the legs 80 g 1     ursodiol (ACTIGALL) 500 MG tablet Take 1 tablet (500 mg) by mouth 2 times daily 180 tablet 3       Allergies  Allergies   Allergen Reactions     Erythromycin GI Disturbance     Losartan Other (See Comments)     Consistently develops hyperkalemia     Vioxx      Nausea, vomiting         Family History  family history includes Arthritis in his mother; Cerebrovascular Disease in his maternal grandfather; Cervical Cancer in his maternal grandmother; Diabetes in his father; Hypertension in his father; No Known Problems in his paternal grandmother; Prostate Cancer in his paternal grandfather; Substance Abuse in his father; Thyroid Cancer in his mother.    Social History  Social History     Tobacco Use     Smoking status: Former     Packs/day: 0.25     Years: 2.00     Pack years: 0.50     Types: Cigarettes     Quit date:      Years since quittin.3     Smokeless tobacco: Former     Types: Chew     Quit date: 10/8/2015     Tobacco comments:     1 Cigar once every other month.   Substance Use Topics     Alcohol use: No     Alcohol/week: 0.0 standard drinks of alcohol     Comment: No alcohol since liver transplant.       Drug use: No       Physical Exam  BP (!) 150/76 (BP Location: Left arm, Patient Position: Chair, Cuff Size: Adult Large)   Pulse 54   Temp 97.9  F (36.6  C) (Tympanic)   Resp 18   Ht 1.829 m (6' 0.01\")   Wt 133.8 kg (295 lb)   SpO2 99%   BMI 40.00 kg/m    Body mass index is 40 " kg/m .  GENERAL :  In no apparent distress  SKIN: Normal color, normal temperature, texture.  No hirsutism, alopecia or purple striae.     EYES: PERRL, EOMI, No scleral icterus,  No proptosis, conjunctival redness, stare, retraction  RESP: Lungs clear to auscultation bilaterally  CARDIAC: Regular rate and rhythm, normal S1 S2, without murmurs, rubs or gallops    NEURO: awake, alert, responds appropriately to questions.  Cranial nerves intact.   Moves all extremities; Gait normal.  No tremor of the outstretched hand.   EXTREMITIES: No clubbing, cyanosis or edema.    DATA REVIEW  [facility] report  Within target range 65% of the time  High 27% and very high 8%  Current Ave  mg/dl

## 2023-05-09 NOTE — Clinical Note
Please abstract the following data from this visit with this patient into the appropriate field in Epic:  Tests that can be patient reported without a hard copy:  Eye exam with ophthalmology on this date: [facility] Exam Location: [place]  Other Tests found in the patient's chart through Chart Review/Care Everywhere:  Eye exam with ophthalmology done by this group [facility] on this date: 08/01/2022  Note to Abstraction: If this section is blank, no results were found via Chart Review/Care Everywhere.

## 2023-05-09 NOTE — LETTER
5/9/2023         RE: Camacho Bhagat  [address]  [facility] [address]        Dear Colleague,    Thank you for referring your patient, Camacho Bhagat, to the [facility]. Please see a copy of my visit note below.    Assessment/Plan :   1. Type 2 DM with long term use of insulin. Camacho is doing well. He has not had any problems with severe hyperglycemia and/or hypoglycemia. He is overdue for a repeat A1C. He has labs drawn, routinely, and he was shocked that the hemoglobin A1C standing order seemed to disappear. I placed another standing order today. We discussed the differences between the Omnipod Dash and the Omnipod 5. We will send in a new prescription for the 5, today. If he has any problems filling the prescription, he will contact our office. We also discussed setting up a CDE appt in order to get started on the 5 but he would like to set it up, himself. Again, if he has any problems, he is to contact our office.    We will plan on a follow-up appt with Dr. West in about 6 mos. I will contact him with the laboratory results, once he has the A1C drawn.    2. Neuropathy. He had called our office a couple weeks ago regarding worsening neuropathy in his legs. He has since seen the orthopedist and it appears that the neuropathy was related to arthritis in his back. He is going to let them manage the neuropathic pain, for now.      I have independently reviewed and interpreted labs, imaging as indicated.      Chief complaint:  Camacho is a 57 year old male who returns for follow-up of Type 2 DM with long term use of insulin.    I have reviewed Care Everywhere including [facility], [facility], [facility], [place], [facility], [facility] , [facility], [place] lab reports, imaging reports and provider notes as indicated.      HISTORY OF PRESENT ILLNESS  Camacho was diagnosed with diabetes around 2002. He was originally started on oral medications but due to ongoing  "hyperglycemia, he was eventually switched to insulin therapy. He has been using an Omnipod insulin pump for the last few years, along with the Dexcom sensor. He feels like the combination is working well. He currently uses the Omnipod Dash but he would like to upgrade to the Omnipod 5, if possible.    His diabetes is complicated by history of CASTAÑEDA/HCC s/p liver transplant, chronic kidney disease, HTN, RONNIE, osteoarthritis, neuropathy, and proteinuria. He is currently followed by nephrology, gastroenterology, and orthopedics with routine laboratory testing every 2 mos.    Since the last endocrinology visit, he developed worsening neuropathy. He wanted to look into options regarding his gabapentin dosing. However, he saw the orthopedist and received a epidural steroid injection, which relieved all neuropathic pain. The steroid did cause some hyperglycemia and he tried to adjust his insulin accordingly. His blood sugars are now back to \"normal.\" He has not had any episodes of severe hypoglycemia. He also denies any worsening vision. His current pump settings remain stable and are as follows:    Basal rate   12 AM 1.5 units/h  3 AM 1.45 units/h  2 PM 1.4 units/h   6 PM 1.45 units/h   9 PM 1.5 units/h  Insulin to carbohydrate ratio 1 unit per 7.5 g   Sensitivity 25   Blood glucose correction threshold 125  Glucose target range 100  Minimum BG for bolus calculation 70  He uses Humalog 200 pens to fill the pump reservoir.     Endocrine relevant labs are as follows:   Latest Reference Range & Units 09/27/22 08:41   Hemoglobin A1C 0.0 - 5.6 % 6.0 (H)   (H): Data is abnormally high    REVIEW OF SYSTEMS  Answers for HPI/ROS submitted by the patient on 5/2/2023  General Symptoms: No  Skin Symptoms: Yes  HENT Symptoms: No  EYE SYMPTOMS: No  HEART SYMPTOMS: No  LUNG SYMPTOMS: No  INTESTINAL SYMPTOMS: Yes  URINARY SYMPTOMS: No  REPRODUCTIVE SYMPTOMS: Yes  SKELETAL SYMPTOMS: Yes  BLOOD SYMPTOMS: No  NERVOUS SYSTEM SYMPTOMS: " No  MENTAL HEALTH SYMPTOMS: No  Changes in hair: No  Changes in moles/birth marks: No  Itching: Yes  Rashes: No  Changes in nails: No  Acne: No  Change in facial hair: No  Warts: No  Non-healing sores: No  Scarring: No  Flaking of skin: No  Color changes of hands/feet in cold : No  Sun sensitivity: No  Skin thickening: No  Heart burn or indigestion: No  Nausea: No  Vomiting: No  Abdominal pain: No  Bloating: No  Constipation: No  Diarrhea: Yes  Blood in stool: No  Black stools: No  Rectal or Anal pain: No  Fecal incontinence: No  Yellowing of skin or eyes: No  Vomit with blood: No  Change in stools: No  Scrotal pain or swelling: No  Erectile dysfunction: Yes  Penile discharge: No  Genital ulcers: No  Reduced libido: No  Back pain: Yes  Muscle aches: Yes  Neck pain: Yes  Swollen joints: Yes  Joint pain: Yes  Bone pain: No  Muscle cramps: Yes  Muscle weakness: Yes  Joint stiffness: Yes  Bone fracture: No    Endocrine: positive for diabetes  Skin: negative  Eyes: negative for, visual blurring  Ears/Nose/Throat: negative  Respiratory: No shortness of breath, dyspnea on exertion, cough, or hemoptysis  Cardiovascular: negative for, chest pain, dyspnea on exertion and exercise intolerance  Gastrointestinal: negative for, nausea, vomiting, constipation and diarrhea  Genitourinary: negative for, nocturia, dysuria, frequency and urgency  Musculoskeletal: positive for muscular weakness, negative for and nocturnal cramping  Neurologic: positive for numbness or tingling of feet (this has improved with epidural)  Psychiatric: negative  Hematologic/Lymphatic/Immunologic: positive for anemia but he was recently told that his liver shows signs of excess iron    Past Medical History  Past Medical History:   Diagnosis Date     Depressive disorder, not elsewhere classified      Diabetes type 2, controlled (H) 11/10/2016     Esophageal reflux      Fibromyalgia 1/2009    dx with Dr Benitez( Rheum)     Gangrene of finger (H) 8/25/2017      H/O deep venous thrombosis 11/2001    Permanent IVC filter in place.     H/O CASTAÑEDA (nonalcoholic steatohepatitis)      H/O Pneumonia, organism unspecified(486) 10/2001    Included ARDS, sepsis, and  acute renal failure; hospitalized, required tracheostomy placement.     H/O: HTN (hypertension) 11/2001    No longer prescribed antihypertensive medication.       History of CVA (cerebrovascular accident)      History of hepatocellular carcinoma      History of liver transplant (H)      History of obstructive sleep apnea     No longer wears CPAP since losing approximately 200 pounds with his liver transplant and its complications.       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     Osteoarthritis      Presence of PERMANENT IVC filter      Rheumatoid arthritis(714.0)        Medications  Current Outpatient Medications   Medication Sig Dispense Refill     alcohol swab prep pads Use to swab area of injection/francisca as directed. 100 each 3     alpha-lipoic acid 600 MG capsule Take 600 mg by mouth       amLODIPine (NORVASC) 10 MG tablet Take 1 tablet (10 mg) by mouth daily 90 tablet 3     augmented betamethasone dipropionate (DIPROLENE-AF) 0.05 % external ointment Apply twice daily as needed for rash on the legs 45 g 11     blood glucose (NO BRAND SPECIFIED) lancets standard Use to test blood sugar 1 times daily or as directed. 100 lancet 3     blood glucose (NO BRAND SPECIFIED) test strip Use to test blood sugar 1 times daily or as directed. 50 strip 11     blood glucose calibration (NO BRAND SPECIFIED) solution Use to calibrate meter. 1 Bottle 3     Blood Glucose Monitoring Suppl (CONTOUR BLOOD GLUCOSE SYSTEM) w/Device KIT For use with the Omnipod Dash insulin pump 1 kit 1     cholecalciferol (VITAMIN D3) 1000 UNIT tablet Take 1 tablet (1,000 Units) by mouth  daily (Patient taking differently: Take 1,000 Units by mouth every morning) 100 tablet 3     Continuous Blood Gluc Sensor (DEXCOM G6 SENSOR) MISC Change every 10 days. 9 each 3     Continuous Blood Gluc Transmit (DEXCOM G6 TRANSMITTER) MISC Change every 3 months. 1 each 3     cyclobenzaprine (FLEXERIL) 10 MG tablet Take 1 tablet (10 mg) by mouth 3 times daily as needed for muscle spasms 90 tablet 3     doxazosin (CARDURA) 2 MG tablet Take 3 tablets (6 mg) by mouth At Bedtime 270 tablet 3     fluticasone (FLONASE) 50 MCG/ACT nasal spray Spray 1 spray into both nostrils daily 20 mL 3     furosemide (LASIX) 40 MG tablet Take 60 mg AM and 40 mg afternoon 180 tablet 3     gabapentin (NEURONTIN) 300 MG capsule Take 2 capsules (600 mg) by mouth 3 times daily 540 capsule 3     hydrOXYzine (ATARAX) 25 MG tablet Take 1 tablet (25 mg) by mouth 3 times daily as needed for itching 30 tablet 1     Insulin Disposable Pump (OMNIPOD 5 G6 POD, GEN 5,) MISC 1 each See Admin Instructions Change every 2 days. Use for insulin administration. 45 each 3     Insulin Disposable Pump (OMNIPOD 5 G6 POD, GEN 5,) MISC 1 each See Admin Instructions Use per 's instructions. 1 each 1     Insulin Lispro (HUMALOG KWIKPEN) 200 UNIT/ML soln To be used in the insulin pump. Total daily dose up to 170 U. 72 mL 3     insulin pen needle (BD NEE U/F) 32G X 4 MM miscellaneous Use 1 pen needle 4 times daily or as directed. 400 each 1     lidocaine (LIDODERM) 5 % patch Place 1 patch onto the skin every 24 hours To prevent lidocaine toxicity, patient should be patch free for 12 hrs daily. 30 patch 11     losartan (COZAAR) 25 MG tablet Take 1 tablet (25 mg) by mouth daily 90 tablet 3     metoprolol succinate ER (TOPROL XL) 200 MG 24 hr tablet Take 1 tablet (200 mg) by mouth daily 90 tablet 3     mycophenolic acid (GENERIC EQUIVALENT) 360 MG EC tablet Take 2 tablets (720 mg) by mouth every 12 hours 360 tablet 3     oxyCODONE (ROXICODONE) 5 MG tablet  Take 1 tablet (5 mg) by mouth 4 times daily as needed for pain maximum 6 tablet(s) per day 30 tablet 0     patiromer (VELTASSA) 8.4 g packet Take 8.4 g by mouth daily 90 each 3     predniSONE (DELTASONE) 2.5 MG tablet Take 1 tablet (2.5 mg) by mouth daily 90 tablet 3     sildenafil (REVATIO) 20 MG tablet Take 2 tablets (40 mg) by mouth daily as needed (erectile dysfunction) 10 tablet 11     tacrolimus (GENERIC EQUIVALENT) 1 MG capsule Take 4 capsules (4 mg) by mouth every morning AND 3 capsules (3 mg) every evening. 630 capsule 3     tretinoin (RETIN-A) 0.025 % external cream Apply a pea-sized amount evenly over the face at nighttime before bed 45 g 11     triamcinolone (KENALOG) 0.1 % external ointment Apply topically 2 times daily to the itching on the legs 80 g 1     ursodiol (ACTIGALL) 500 MG tablet Take 1 tablet (500 mg) by mouth 2 times daily 180 tablet 3       Allergies  Allergies   Allergen Reactions     Erythromycin GI Disturbance     Losartan Other (See Comments)     Consistently develops hyperkalemia     Vioxx      Nausea, vomiting         Family History  family history includes Arthritis in his mother; Cerebrovascular Disease in his maternal grandfather; Cervical Cancer in his maternal grandmother; Diabetes in his father; Hypertension in his father; No Known Problems in his paternal grandmother; Prostate Cancer in his paternal grandfather; Substance Abuse in his father; Thyroid Cancer in his mother.    Social History  Social History     Tobacco Use     Smoking status: Former     Packs/day: 0.25     Years: 2.00     Pack years: 0.50     Types: Cigarettes     Quit date:      Years since quittin.3     Smokeless tobacco: Former     Types: Chew     Quit date: 10/8/2015     Tobacco comments:     1 Cigar once every other month.   Substance Use Topics     Alcohol use: No     Alcohol/week: 0.0 standard drinks of alcohol     Comment: No alcohol since liver transplant.       Drug use: No       Physical  "Exam  BP (!) 150/76 (BP Location: Left arm, Patient Position: Chair, Cuff Size: Adult Large)   Pulse 54   Temp 97.9  F (36.6  C) (Tympanic)   Resp 18   Ht 1.829 m (6' 0.01\")   Wt 133.8 kg (295 lb)   SpO2 99%   BMI 40.00 kg/m    Body mass index is 40 kg/m .  GENERAL :  In no apparent distress  SKIN: Normal color, normal temperature, texture.  No hirsutism, alopecia or purple striae.     EYES: PERRL, EOMI, No scleral icterus,  No proptosis, conjunctival redness, stare, retraction  RESP: Lungs clear to auscultation bilaterally  CARDIAC: Regular rate and rhythm, normal S1 S2, without murmurs, rubs or gallops    NEURO: awake, alert, responds appropriately to questions.  Cranial nerves intact.   Moves all extremities; Gait normal.  No tremor of the outstretched hand.   EXTREMITIES: No clubbing, cyanosis or edema.    DATA REVIEW  [facility] report  Within target range 65% of the time  High 27% and very high 8%  Current Ave  mg/dl              Again, thank you for allowing me to participate in the care of your patient.        Sincerely,        Ninfa Rose PA-C    "

## 2023-05-11 ASSESSMENT — PAIN SCALES - PAIN ENJOYMENT GENERAL ACTIVITY SCALE (PEG)
INTERFERED_ENJOYMENT_LIFE: 8
INTERFERED_GENERAL_ACTIVITY: 8
AVG_PAIN_PASTWEEK: 9
PEG_TOTALSCORE: 8.33
INTERFERED_GENERAL_ACTIVITY: 8
PEG_TOTALSCORE: 8.33
AVG_PAIN_PASTWEEK: 9
INTERFERED_ENJOYMENT_LIFE: 8

## 2023-05-11 ASSESSMENT — ANXIETY QUESTIONNAIRES
8. IF YOU CHECKED OFF ANY PROBLEMS, HOW DIFFICULT HAVE THESE MADE IT FOR YOU TO DO YOUR WORK, TAKE CARE OF THINGS AT HOME, OR GET ALONG WITH OTHER PEOPLE?: NOT DIFFICULT AT ALL
7. FEELING AFRAID AS IF SOMETHING AWFUL MIGHT HAPPEN: NOT AT ALL
7. FEELING AFRAID AS IF SOMETHING AWFUL MIGHT HAPPEN: NOT AT ALL
GAD7 TOTAL SCORE: 0
2. NOT BEING ABLE TO STOP OR CONTROL WORRYING: NOT AT ALL
5. BEING SO RESTLESS THAT IT IS HARD TO SIT STILL: NOT AT ALL
6. BECOMING EASILY ANNOYED OR IRRITABLE: NOT AT ALL
1. FEELING NERVOUS, ANXIOUS, OR ON EDGE: NOT AT ALL
IF YOU CHECKED OFF ANY PROBLEMS ON THIS QUESTIONNAIRE, HOW DIFFICULT HAVE THESE PROBLEMS MADE IT FOR YOU TO DO YOUR WORK, TAKE CARE OF THINGS AT HOME, OR GET ALONG WITH OTHER PEOPLE: NOT DIFFICULT AT ALL
3. WORRYING TOO MUCH ABOUT DIFFERENT THINGS: NOT AT ALL
GAD7 TOTAL SCORE: 0
4. TROUBLE RELAXING: NOT AT ALL

## 2023-05-15 ENCOUNTER — OFFICE VISIT (OUTPATIENT)
Dept: INTERNAL MEDICINE | Facility: CLINIC | Age: 58
End: 2023-05-15
Payer: COMMERCIAL

## 2023-05-15 VITALS
BODY MASS INDEX: 39.68 KG/M2 | HEIGHT: 72 IN | SYSTOLIC BLOOD PRESSURE: 167 MMHG | OXYGEN SATURATION: 98 % | HEART RATE: 57 BPM | WEIGHT: 293 LBS | DIASTOLIC BLOOD PRESSURE: 78 MMHG

## 2023-05-15 DIAGNOSIS — D84.9 IMMUNOSUPPRESSION (H): ICD-10-CM

## 2023-05-15 DIAGNOSIS — L30.0 NUMMULAR ECZEMA: ICD-10-CM

## 2023-05-15 DIAGNOSIS — L29.9 PRURITIC DISORDER: ICD-10-CM

## 2023-05-15 DIAGNOSIS — M06.9 RHEUMATOID ARTHRITIS INVOLVING MULTIPLE SITES, UNSPECIFIED WHETHER RHEUMATOID FACTOR PRESENT (H): ICD-10-CM

## 2023-05-15 DIAGNOSIS — Z23 NEED FOR TDAP VACCINATION: ICD-10-CM

## 2023-05-15 DIAGNOSIS — C22.0 HEPATOCELLULAR CARCINOMA (H): ICD-10-CM

## 2023-05-15 DIAGNOSIS — F11.90 CHRONIC, CONTINUOUS USE OF OPIOIDS: ICD-10-CM

## 2023-05-15 DIAGNOSIS — E11.42 DIABETIC POLYNEUROPATHY ASSOCIATED WITH TYPE 2 DIABETES MELLITUS (H): Primary | ICD-10-CM

## 2023-05-15 PROCEDURE — 99214 OFFICE O/P EST MOD 30 MIN: CPT | Mod: 25 | Performed by: INTERNAL MEDICINE

## 2023-05-15 PROCEDURE — 90715 TDAP VACCINE 7 YRS/> IM: CPT | Performed by: INTERNAL MEDICINE

## 2023-05-15 PROCEDURE — 90471 IMMUNIZATION ADMIN: CPT | Performed by: INTERNAL MEDICINE

## 2023-05-15 RX ORDER — GABAPENTIN 800 MG/1
800 TABLET ORAL 3 TIMES DAILY
Qty: 90 TABLET | Refills: 11 | Status: SHIPPED | OUTPATIENT
Start: 2023-05-15

## 2023-05-15 RX ORDER — HYDROXYZINE HYDROCHLORIDE 25 MG/1
25 TABLET, FILM COATED ORAL 3 TIMES DAILY PRN
Qty: 90 TABLET | Refills: 3 | Status: SHIPPED | OUTPATIENT
Start: 2023-05-15

## 2023-05-15 NOTE — NURSING NOTE
"Camacho Bhagat is a 57 year old male patient that presents today in clinic for the following:    Chief Complaint   Patient presents with     Derm Problem     Pt here for itching for 1.5-2 months which has gotten better with hydroxyzine.     The patient's allergies and medications were reviewed as noted. A set of vitals were recorded as noted without incident: BP (!) 167/78 (BP Location: Right arm, Patient Position: Sitting, Cuff Size: Adult Large)   Pulse 57   Ht 1.829 m (6' 0.01\")   Wt 132.9 kg (293 lb)   SpO2 98%   BMI 39.73 kg/m  . The patient does not have any other questions for the provider.    Moriah Lott, EMT at 8:44 AM on 5/15/2023  "

## 2023-05-15 NOTE — PROGRESS NOTES
ASSESSMENT/PLAN:  Neuropathic pain - he is on gabapentin 600mg TID and alpha lipoic acid (which made a difference), I will increase to 800mg TID to see if we can get added benefit from the neuropathic symptoms (burning, tingling) which is probably confounding this issue.     Endo - managed by endo, seen 5/9/23    Nummular eczema - on Diprolene after seeing Derm 5/3/23, I will renew hydroxyzine for the itch which was helpful.    CMV colitis s/p hemicolectomy - work up proceeding per GI on 4/18/23    Low back pain - he will go to the pain clinic in 3 days to talk about options, he is also pursuing another injection since the last one lasted 1.5 months    Shay Kirkpatrick MD, FACP      Chief complaint:  Camacho Bhagat is a 57 year old male presents for   Chief Complaint   Patient presents with     Derm Problem     Pt here for itching for 1.5-2 months which has gotten better with hydroxyzine.        SUBJECTIVE:  He has itching on the knees to the feet. I gave him a cream and hydroxyzine which helped and he was seen by dermatology and was told it was nummular eczema.     He has neuropathy from his back.  He had a steroid injection in his back in February which helped x 1.5-2 months.  He will wait until May 30th. He will see the pain clinic in 3 days too. This is a bad spot for the pain.    He will get diabetic shoes at the beginning of June. The foot and the back hurt and everything in between hurts.      Medications and allergies were reviewed by me today.     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   Plan:  Increase tacrolimus dose, currently on low dose tacrolimus with level < 3.0.  Plan to increase for goal = 8.    Also on myfortic 720mg Q 12 hours.   Prednisone 2.5mg q day.           Abscess, intra-abdominal, postoperative 02/13/2018     Priority: Medium     Peristomal skin complication 11/16/2017     Priority: Medium     Replacing diagnoses that were inactivated after the 10/1/2021 regulatory import.       Painful periwound skin 11/16/2017     Priority: Medium     Elevated serum creatinine 10/16/2017     Priority: Medium     Pancytopenia (H) 08/25/2017     Priority: Medium     Ischemia of both lower extremities 08/25/2017     Priority: Medium     Distal ischemia due to shock/high pressor requirements       S/P liver transplant (H) 07/26/2017     Priority: Medium     Neutropenic colitis (H) 07/04/2017     Priority: Medium     Immunosuppressed status (H) 03/10/2017     Priority: Medium     Liver transplant recipient (H) 03/04/2017     Priority: Medium     Hyperkalemia 02/14/2017     Priority: Medium     Hepatocellular carcinoma (H) 01/27/2016     Priority: Medium     Osteoarthritis 01/18/2015     Priority: Medium     Rheumatoid arthritis (H) 01/18/2015     Priority: Medium     Medication refill- do not delete  11/13/2013     Priority: Medium     Fibromyalgia 08/15/2011     Priority: Medium     Sicca syndrome (H) 08/15/2011     Priority: Medium     Testicular hypofunction      Priority: Medium     Problem list name updated by automated process. Provider to review       Carpal tunnel syndrome 07/08/2002     Priority: Medium       PHYSICAL EXAM:  BP (!) 167/78 (BP Location: Right arm, Patient Position: Sitting, Cuff Size: Adult Large)   Pulse 57   Ht 1.829 m (6' 0.01\")   Wt 132.9 kg " (293 lb)   SpO2 98%   BMI 39.73 kg/m    Constitutional: no distress, comfortable, pleasant   Skin: inner ankles bilaterally there is a patch of excoriation but no other lesions, there is a little bit of bronzing of the skin on the shins

## 2023-05-18 ENCOUNTER — OFFICE VISIT (OUTPATIENT)
Dept: PALLIATIVE MEDICINE | Facility: CLINIC | Age: 58
End: 2023-05-18
Attending: PODIATRIST
Payer: COMMERCIAL

## 2023-05-18 ENCOUNTER — TELEPHONE (OUTPATIENT)
Dept: GASTROENTEROLOGY | Facility: CLINIC | Age: 58
End: 2023-05-18

## 2023-05-18 VITALS — SYSTOLIC BLOOD PRESSURE: 156 MMHG | HEART RATE: 62 BPM | OXYGEN SATURATION: 98 % | DIASTOLIC BLOOD PRESSURE: 79 MMHG

## 2023-05-18 DIAGNOSIS — G62.9 PERIPHERAL POLYNEUROPATHY: ICD-10-CM

## 2023-05-18 DIAGNOSIS — Z86.73 HISTORY OF CVA (CEREBROVASCULAR ACCIDENT): Primary | ICD-10-CM

## 2023-05-18 DIAGNOSIS — M79.671 PAIN IN BOTH FEET: ICD-10-CM

## 2023-05-18 DIAGNOSIS — M79.7 FIBROMYALGIA: ICD-10-CM

## 2023-05-18 DIAGNOSIS — M79.672 PAIN IN BOTH FEET: ICD-10-CM

## 2023-05-18 PROBLEM — R23.8 PAINFUL PERIWOUND SKIN: Status: RESOLVED | Noted: 2017-11-16 | Resolved: 2023-05-18

## 2023-05-18 PROCEDURE — 99417 PROLNG OP E/M EACH 15 MIN: CPT | Performed by: NURSE PRACTITIONER

## 2023-05-18 PROCEDURE — 99205 OFFICE O/P NEW HI 60 MIN: CPT | Performed by: NURSE PRACTITIONER

## 2023-05-18 RX ORDER — DULOXETIN HYDROCHLORIDE 30 MG/1
30 CAPSULE, DELAYED RELEASE ORAL DAILY
Qty: 30 CAPSULE | Refills: 1 | Status: ON HOLD | OUTPATIENT
Start: 2023-05-18 | End: 2023-08-17

## 2023-05-18 ASSESSMENT — PAIN SCALES - GENERAL: PAINLEVEL: EXTREME PAIN (9)

## 2023-05-18 ASSESSMENT — PATIENT HEALTH QUESTIONNAIRE - PHQ9: SUM OF ALL RESPONSES TO PHQ QUESTIONS 1-9: 0

## 2023-05-18 NOTE — PATIENT INSTRUCTIONS
Consider adding duloxetine to help with wide spread pain. Do not start this until after you have already been on the increased dose of gabapentin for a few weeks.   Talk to Dr. Doty to determine where you would like to do your injections in the future.

## 2023-05-18 NOTE — TELEPHONE ENCOUNTER
Screening Questions  BLUE  KIND OF PREP RED  LOCATION [review exclusion criteria] GREEN  SEDATION TYPE        Y Are you active on mychart?       KATHY DANIEL Ordering/Referring Provider?        [employer] What type of coverage do you have?      N Have you had a positive covid test in the last 14 days?     39.7 1. BMI  [BMI 40+ - review exclusion criteria& smart-phrase document]    Y  2. Are you able to give consent for your medical care? [IF NO,RN REVIEW]          Y  3. Are you taking any prescription pain medications on a routine schedule   (ex narcotics: oxycodone, roxicodone, oxycontin,  and percocet)? [RN Review]        OXYCODONE  3a. EXTENDED PREP What kind of prescription?     N 4. Do you have any chemical dependencies such as alcohol, street drugs, or methadone?        **If yes 3- 5 , please schedule with MAC sedation.**          IF YES TO ANY 6 - 10 - HOSPITAL SETTING ONLY.     N 6.   Do you need assistance transferring?     N 7.   Have you had a heart or lung transplant?    N 8.   Are you currently on dialysis?   N 9.   Do you use daily home oxygen?   N 10. Do you take nitroglycerin?   10a.  If yes, how often?      11. Are you currently pregnant?    11a.  If yes, how many weeks? [ Greater than 12 weeks, OR NEEDED]    N 12. Do you have Pulmonary Hypertension? *NEED PAC APPT AT [facility] w/ [facility]*     Y - IVC FILTER, ALSO HAS INSULIN PUMP 13. [review exclusion criteria]  Do you have any implantable devices in your body (pacemaker, defib, LVAD)?    N 14. In the past 6 months, have you had any heart related issues including cardiomyopathy or heart attack?     14a.  If yes, did it require cardiac stenting if so when?     N 15. Have you had a stroke or Transient ischemic attack (TIA - aka  mini stroke ) within 6 months?      Y 16. Do you have mod to severe Obstructive Sleep Apnea?  [Hospital only]    N 17. Do you have SEVERE AND UNCONTROLLED asthma? *NEED PAC APPT AT [facility] w/[facility]*     18.Do you take blood  "thinners?  No    N 19. Do you take any of the following medications?    Phentermine    Ozempic    Wegovy (Semaglutide)      19a. If yes, \"Hold for 7 days before procedure.  Please consult your prescribing provider if you have questions about holding this medication.\"     Y  20. Do you have chronic kidney disease?      Y  21. Do you have a diagnosis of diabetes?      23. Preferred LOCAL Pharmacy for Pre Prescription      [id number] - [place], [place] - [address] REMINDERS -    You will receive a call from a Nurse to review instructions and health history.  This assessment must be completed prior to your procedure.  Failure to complete the Nurse assessment may result in the procedure being cancelled.      On the day of your procedure, please designatean adult(s) who can drive you home stay with you for the next 24 hours. The medicines used in the exam will make you sleepy. You will not be able to drive.      You cannot take public transportation, ride share services, or non-medical taxi service without a responsible caregiver.  Medical transport services are allowed with the requirement that a responsible caregiver will receive you at your destination.  We require that drivers and caregivers are confirmed prior to your procedure.      - SCHEDULING DETAILS -  Y & RONNIE Hospital Setting Required & If yes, what is the exclusion?   CASIE DANILE  Surgeon    8/17/2023  Date of Procedure  Upper Endoscopy [EGD]  Type of Procedure Scheduled  [facility] Location   MAC Sedation Type     NO PAC / Pre-op Required               "

## 2023-05-18 NOTE — NURSING NOTE
5/18/2023    10:54 AM   PEG Score   PEG Total Score 9         Kailee Amezcua MA  [facility] [place]

## 2023-05-18 NOTE — PROGRESS NOTES
[facility] Pain Management     Date of visit: 5/18/2023     Reason for visit:  Consultation: Camacho Bhagat is a 57 year old male with PMH significant for DM TII, Fibromyalgia, S/p liver transplant, s/p SB resection, GERD and RONNIE who is seen today at the request of Ascencion Gorman for evaluation of his pain issues and recommendations for management. Camacho has not been seen at a pain clinic in the past.  Currently treating lower back pain with Dr. Doty (sports medicine).    Ascencion Gorman DPM   [address]   [place] [id number]   Phone: [phone]   Fax: [phone]    Diagnoses: Pain in both feet   Peripheral polyneuropathy   Order: Pain Management  Referral          Relevant records/History:  4/17/23: Dr. Gorman: seen for evaluation of pronounced bilateral lower extremity pain, greater than 6 months.  The patient has he has chronic low back issues with pain that radiates down his feet.  He had a lumbar spine injection in February that helped with back and radiating pain, but the pain in the feet persists.  The patient is also diabetic and states he has diabetic neuropathy.  He indicates numbness, tingling and burning in his feet.  He cannot take Tylenol because of a previous liver transplant, and cannot have NSAIDs.  He waits until the pain is severe and then he takes an oxycodone.  1.  Diffuse bilateral lower extremity pain in setting advanced lumbosacral spine pathology status post previous back injections as well as diabetic peripheral neuropathy  -The patient has pain essentially everywhere throughout bilateral lower extremity.  -Orthotic lab for diabetic shoes and orthotics.  He bought new shoes recently with inserts and states he had about 7 months of relief.  Hopefully custom orthotics will help to alleviate pain further.  -The patient is limited on what oral medications he can take.  Because of the severe pain levels, as well as lumbar spine and  "peripheral neuropathy involvement, I placed a pain management referral for further discussion of treatment options  5/15/23: Dr. Kirkpatrick: Neuropathic pain - he is on gabapentin 600mg TID and alpha lipoic acid (which made a difference), I will increase to 800mg TID to see if we can get added benefit from the neuropathic symptoms (burning, tingling) which is probably confounding this issue.   Low back pain - he will go to the pain clinic in 3 days to talk about options, he is also pursuing another injection since the last one lasted 1.5 months  ____________________________________________________________    Camacho reports:  \"I was an , developed lot of OA. Then after liver transplant, had a lot of pain after 9 hour surgery. I have had exteme pain since 2014.\"    - Has wide spread pain.   - Lower back pain, had DANNY in February that helped for 6 weeks.   - Bilateral hip pain, has bhavya helped by steroid injections in the past, but thinks that these have worn off.  - Had knee injections in the past, steroids helped, then he had no pain for a long time. Knees started hurting 1 month ago.   - Was fitted for new orthotics a week ago.   - Dr. Kirkpatrick manages his oxycodone, takes 5 mg if the pain is a \"10/10,  But this would probably be a 12/10 for you.\" Does feel like this has been helpful for him. When he takes higher doses of opioids, he gets constipation. He thinks it helps, but also states that it doesn't take it early enough. 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    Notes that he has neuropathy, but was told that it was not from his diabetes.   - He reports more issues with his hands lately. This is cramping pain, especially in his thumbs. Occasionally he gets pain in his other fingers as well.   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  - Had multiple right wrist surgeries with [facility], he believes that he had a carpal tunnel release and trigger finger release at the same time. " "  - flexeril was helpful for muscle spasms in the past, but now it \"knocks me out.\"   - would like to know about medical cannabis    Pain description:  Location: hands, back and legs are constant, neck and shoulders get sore intermittently.    Quality: burning, sharp, numbness, dull, aching, throbbing   Duration: Constant   Severity/Intensity (0 = No pain to 10 = Worst pain imaginable)   Now:  , Average: 8, Best: 7, Worst: 10  Aggravating factors include: lying down, standing, sitting, walking, exercise  Relieving factors include: nothing relieves the pains, guided imagery has been helpful.     Current pain medications:  Gabapentin 800 mg TID -recently increased, has not started yet)  Oxycodone 5 mg QID PRN (prescribed by PCP)  Alpha-lipoic acid 600 mg every day  Lidocaine patches  Flexeril 10 mg TID PRN- uses rarely    Other relevant medications  Prednisone 2.5 mg every day- for anti-rejection  Hydroxyzine prn for itching, takes it TID      Review of [place] Prescription Monitoring Program ():   No concern for abuse or misuse of controlled medications based on this report. Viewed on 5/18/2023  Oxycodone 5 mg #30 every 2-3 months  Controlled medications are being prescribed by the PCP.   Current calculated MME: n/a    PAIN MANAGEMENT TREATMENT HISTORY  1. MEDICATIONS:  Opiates: Hydrocodone -not helpful, oxycodone -helpful  NSAIDS:Celecoxib, Ibuprofen, Nabumetone (Relafen), Naproxen  Muscle Relaxants: cyclobenzaprine  Anti-depressants: not tried  Sleep aids: Valium, hydroxyzine  Neuropathics: Pregabalin, SE- weight gain, no pain relief, Gabapentin works well for him when he takes it with alpha lipoic acid.   Topicals: Lidoderm patch - somewhat helpful  Adjuvant pain medications: avoids acetaminophen  2. PHYSICAL THERAPY:   Tried, not helpful \"I don't really do PT as I know more about what my limits are and it takes up too much time.\"  Last did in 2017 in [place] x 1 visit for neck pain  2011 x 3 visits for " neck pain  3. PAIN PSYCHOLOGY:   Has not tried  4. SURGERY:   Wrists, no other orthopedic surgeries  5. INJECTIONS:   Bilateral greater trochanteric bursa injection with kenalog and USG with Dr. Doty on 9/13/2022  L4-5 ILESI with DAYO Croft, relief for 6 weeks on 02/15/2023   bilateral intra-articular hip cortisone injections with Dr. Yeo on 10/8/2020  6. COMPLEMENTARY THERAPY:  Chiropractic: Not interested in trying  Acupuncture/acupressure:Has not tried, Interested in trying  TENS unit:Tried, helpful but only while using it. Uses it everywhere depending where there is pain  heat/heat packs:Tried, helpful short term  cold/cold packs:Tried, helpful short term  Distraction/mindfulness/meditation/guided imagery: helpful   Imaging:  MR LUMBAR SPINE W/O CONTRAST 9/16/2022   Comparison: Plain radiographs 9/13/2022  L1-2: Disc bulge. Minimal bilateral neural foraminal stenosis. No spinal canal stenosis.  L2-3: Disc bulge. Mild bilateral neural foraminal stenosis. No spinal canal stenosis.  L4-5: Disc bulge and right greater than left facet hypertrophy. Mild to moderate right and mild left foraminal stenosis. Mild spinal canal stenosis.  L5-S1: Disc osteophyte complex. Bilateral facet hypertrophy. Mild to moderate bilateral neural foraminal stenosis. No spinal canal stenosis.                                                         Impression:   Multilevel lumbar spondylosis without high-grade spinal canal  stenosis. Mild to moderate bilateral neural foraminal stenosis at  L5-S1 and mild to moderate right neuroforaminal stenosis at L4-5.    Past Medical History:  He has a past medical history of Diabetes type 2, controlled (H) (11/10/2016), Esophageal reflux, Fibromyalgia (01/2009), Gangrene of finger (H) (08/25/2017), H/O deep venous thrombosis (11/2001), H/O CASTAÑEDA (nonalcoholic steatohepatitis), H/O Pneumonia, organism unspecified(486) (10/2001), H/O: HTN (hypertension) (11/2001), History of hepatocellular  carcinoma, History of liver transplant (H), History of obstructive sleep apnea, HLD (hyperlipidemia), Ischemia of both lower extremities (2017), Liver transplant rejection (H) (2018), Neutropenic colitis (H) (2017), Osteoarthritis, Presence of PERMANENT IVC filter, and Rheumatoid arthritis(714.0).   Past Surgical History:  He has a past surgical history that includes Arthroscopy knee with meniscal repair (Right, ); Cholecystectomy; Esophagoscopy, gastroscopy, duodenoscopy (EGD), combined (N/A, 2016); Transplant liver recipient  donor (N/A, 2017); Bench liver (N/A, 2017); Laparotomy exploratory (N/A, 2017); Laparotomy exploratory (N/A, 2017); Colonoscopy (N/A, 2017); Laparotomy exploratory (N/A, 2017); Esophagoscopy, gastroscopy, duodenoscopy (EGD), combined (N/A, 2017); Tracheostomy (); ENT surgery; orthopedic surgery (Right); Release trigger finger (Right, 11/10/2017); Takedown ileostomy (N/A, 2018); Incision and drainage abdomen washout, combined (Right, 2018); Endoscopic retrograde cholangiopancreatogram (N/A, 2018); and Colonoscopy (N/A, 10/24/2022).     Social History:  Social History     Tobacco Use     Smoking status: Former     Packs/day: 0.25     Years: 2.00     Pack years: 0.50     Types: Cigarettes     Quit date:      Years since quittin.4     Smokeless tobacco: Former     Types: Chew     Quit date: 10/8/2015     Tobacco comments:     1 Cigar once every other month.   Substance Use Topics     Alcohol use: No     Alcohol/week: 0.0 standard drinks of alcohol     Comment: No alcohol since liver transplant.       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           Current Outpatient Medications:      alcohol swab prep pads, Use to swab area of injection/francisca as directed., Disp: 100 each, Rfl: 3     alpha-lipoic acid 600 MG  capsule, Take 600 mg by mouth, Disp: , Rfl:      amLODIPine (NORVASC) 10 MG tablet, Take 1 tablet (10 mg) by mouth daily, Disp: 90 tablet, Rfl: 3     augmented betamethasone dipropionate (DIPROLENE-AF) 0.05 % external ointment, Apply twice daily as needed for rash on the legs, Disp: 45 g, Rfl: 11     blood glucose (NO BRAND SPECIFIED) lancets standard, Use to test blood sugar 1 times daily or as directed., Disp: 100 lancet, Rfl: 3     blood glucose (NO BRAND SPECIFIED) test strip, Use to test blood sugar 1 times daily or as directed., Disp: 50 strip, Rfl: 11     blood glucose calibration (NO BRAND SPECIFIED) solution, Use to calibrate meter., Disp: 1 Bottle, Rfl: 3     Blood Glucose Monitoring Suppl (CONTOUR BLOOD GLUCOSE SYSTEM) w/Device KIT, For use with the Omnipod Dash insulin pump, Disp: 1 kit, Rfl: 1     cholecalciferol (VITAMIN D3) 1000 UNIT tablet, Take 1 tablet (1,000 Units) by mouth daily (Patient taking differently: Take 1,000 Units by mouth every morning), Disp: 100 tablet, Rfl: 3     Continuous Blood Gluc Sensor (DEXCOM G6 SENSOR) MISC, Change every 10 days., Disp: 9 each, Rfl: 3     Continuous Blood Gluc Transmit (DEXCOM G6 TRANSMITTER) MISC, Change every 3 months., Disp: 1 each, Rfl: 3     cyclobenzaprine (FLEXERIL) 10 MG tablet, Take 1 tablet (10 mg) by mouth 3 times daily as needed for muscle spasms, Disp: 90 tablet, Rfl: 3     doxazosin (CARDURA) 2 MG tablet, Take 3 tablets (6 mg) by mouth At Bedtime, Disp: 270 tablet, Rfl: 3     DULoxetine (CYMBALTA) 30 MG capsule, Take 1 capsule (30 mg) by mouth daily, Disp: 30 capsule, Rfl: 1     fluticasone (FLONASE) 50 MCG/ACT nasal spray, Spray 1 spray into both nostrils daily, Disp: 20 mL, Rfl: 3     furosemide (LASIX) 40 MG tablet, Take 60 mg AM and 40 mg afternoon, Disp: 180 tablet, Rfl: 3     gabapentin (NEURONTIN) 800 MG tablet, Take 1 tablet (800 mg) by mouth 3 times daily, Disp: 90 tablet, Rfl: 11     hydrOXYzine (ATARAX) 25 MG tablet, Take 1 tablet  (25 mg) by mouth 3 times daily as needed for itching, Disp: 90 tablet, Rfl: 3     Insulin Disposable Pump (OMNIPOD 5 G6 POD, GEN 5,) MISC, 1 each See Admin Instructions Change every 2 days. Use for insulin administration., Disp: 45 each, Rfl: 3     Insulin Disposable Pump (OMNIPOD 5 G6 POD, GEN 5,) MISC, 1 each See Admin Instructions Use per 's instructions., Disp: 1 each, Rfl: 1     Insulin Lispro (HUMALOG KWIKPEN) 200 UNIT/ML soln, To be used in the insulin pump. Total daily dose up to 170 U., Disp: 72 mL, Rfl: 3     insulin pen needle (BD ENE U/F) 32G X 4 MM miscellaneous, Use 1 pen needle 4 times daily or as directed., Disp: 400 each, Rfl: 1     lidocaine (LIDODERM) 5 % patch, Place 1 patch onto the skin every 24 hours To prevent lidocaine toxicity, patient should be patch free for 12 hrs daily., Disp: 30 patch, Rfl: 11     losartan (COZAAR) 25 MG tablet, Take 1 tablet (25 mg) by mouth daily, Disp: 90 tablet, Rfl: 3     metoprolol succinate ER (TOPROL XL) 200 MG 24 hr tablet, Take 1 tablet (200 mg) by mouth daily, Disp: 90 tablet, Rfl: 3     mycophenolic acid (GENERIC EQUIVALENT) 360 MG EC tablet, Take 2 tablets (720 mg) by mouth every 12 hours, Disp: 360 tablet, Rfl: 3     oxyCODONE (ROXICODONE) 5 MG tablet, Take 1 tablet (5 mg) by mouth 4 times daily as needed for pain maximum 6 tablet(s) per day, Disp: 30 tablet, Rfl: 0     patiromer (VELTASSA) 8.4 g packet, Take 8.4 g by mouth daily, Disp: 90 each, Rfl: 3     predniSONE (DELTASONE) 2.5 MG tablet, Take 1 tablet (2.5 mg) by mouth daily, Disp: 90 tablet, Rfl: 3     sildenafil (REVATIO) 20 MG tablet, Take 2 tablets (40 mg) by mouth daily as needed (erectile dysfunction), Disp: 10 tablet, Rfl: 11     tacrolimus (GENERIC EQUIVALENT) 1 MG capsule, Take 4 capsules (4 mg) by mouth every morning AND 3 capsules (3 mg) every evening., Disp: 630 capsule, Rfl: 3     tretinoin (RETIN-A) 0.025 % external cream, Apply a pea-sized amount evenly over the face at  nighttime before bed, Disp: 45 g, Rfl: 11     triamcinolone (KENALOG) 0.1 % external ointment, Apply topically 2 times daily to the itching on the legs, Disp: 80 g, Rfl: 1     ursodiol (ACTIGALL) 500 MG tablet, Take 1 tablet (500 mg) by mouth 2 times daily, Disp: 180 tablet, Rfl: 3     Allergies   Allergen Reactions     Erythromycin GI Disturbance     Losartan Other (See Comments)     Consistently develops hyperkalemia     Vioxx      Nausea, vomiting       Family History   Problem Relation Age of Onset     Diabetes Father      Hypertension Father      Substance Abuse Father      Arthritis Mother      Thyroid Cancer Mother         Survivor!     Cervical Cancer Maternal Grandmother      Cerebrovascular Disease Maternal Grandfather      No Known Problems Paternal Grandmother      Prostate Cancer Paternal Grandfather      Colon Cancer No family hx of      Hyperlipidemia No family hx of      Coronary Artery Disease No family hx of      Breast Cancer No family hx of      OBJECTIVE  Vitals:    05/18/23 1054   BP: (!) 156/79   Pulse: 62   SpO2: 98%     Constitutional: Well developed, well nourished, appears stated age. No acute distress.  Gait is normal  HEENT: Head atraumatic, normocephalic. Eyes without conjunctival injection or jaundice. Neck supple.   Skin: No obvious rash, lesions, or petechiae of exposed skin.   Extremities: Peripheral pulses intact. No clubbing, cyanosis, or edema. Moves all extremities. Bilateral OA changes to his bilateral hands.   Psychiatric/mental status: Alert, without lethargy or stupor. Speech fluent. Appropriate affect. Mood normal. Able to follow commands without difficulty.   Musculoskeletal exam: Normal bulk and tone. Unremarkable spinal curvature.  Lumbar/Thoracic spine:   ROM: moderately restricted  Rotation/ext to right: pain free  Rotation/ext to left: pain free  Myofascial tenderness:lumbar paraspinals  Focal tenderness: No SI joint, gluteal, piriformis tenderness; mild GT  tenderness  Normal 5/5 LE strength bilaterally   Normal sensation to light touch in the lower extremities bilaterally   No allodynia, dysesthesia, or hyperalgesia in the lower extremities bilaterally   Straight leg raise: negative    ASSESSMENT AND PLAN:  Pain in both feet  Peripheral polyneuropathy  Lumbar DDD  Fibromyalgia  We discussed treatment options that I can offer through the pain clinic. He is not interested in comprehensive program at this time, as he does not feel that he could gain skills or benefit from pain PT or Pain psychology approach. He has been happy with his care with Dr. Doty and is interested in additional epidural and hip injections; he would really only want to switch to receiving injections in the pain clinic if scheduling was faster. He is also content with his current medicaiton regimen. He does seem to be using opioids appropriately, I would not recommend increasing the dose. We discussed that increasing doses and frequency can lead to increased tolerance and decreased efficacy over time, including hte development of opioid induced hyperalgesia. In regards to his poly neuropathy, we discussed that he could consider adding duloxetine to help with wide spread pain. However, gabapentin recently increased and he has not taken this increased dose yet. I advised that he see how the increase improves his pain, then if inadequate relief could try adding duloxetine.   He has an appointment with Dr. Doty in 2 weeks, will discuss injections at that appointment.     Regarding medical cannabis, he likely would be a candidate due to his wide spread pain however would recommend discussing with his transplant team first. That being said, he states several times that he is not interested in this option, rather just wanted to know more about the process.    Lauren Bryan, CNP-BC, PMGT-BC, AP-PMN  [facility]          BILLING TIME DOCUMENTATION:   The  total TIME spent on this patient on the date of the encounter/appointment was 100 minutes.      TOTAL TIME includes:   Time spent preparing to see the patient by reviewing available medical information in the patient's medical record, including relevant provider notes, laboratory work, and imaging 12 minutes  Time spent face to face (or over the phone) with the patient 62 minutes  Time spent ordering tests, medications, procedures and referrals 6 minutes  Time spent Referring and communicating with other healthcare professionals 2 minutes  Time spent documenting clinical information in Epic 18 minutes

## 2023-05-30 ENCOUNTER — OFFICE VISIT (OUTPATIENT)
Dept: ORTHOPEDICS | Facility: CLINIC | Age: 58
End: 2023-05-30
Payer: COMMERCIAL

## 2023-05-30 VITALS — WEIGHT: 293 LBS | HEIGHT: 72 IN | BODY MASS INDEX: 39.68 KG/M2

## 2023-05-30 DIAGNOSIS — M51.16 LUMBAR DISC HERNIATION WITH RADICULOPATHY: Primary | ICD-10-CM

## 2023-05-30 PROCEDURE — 99214 OFFICE O/P EST MOD 30 MIN: CPT | Performed by: PREVENTIVE MEDICINE

## 2023-05-30 NOTE — PATIENT INSTRUCTIONS
Low Back Pain Exercises    EXERCISES  These exercises are intended only as suggestions. Be sure to check with your provider before starting the exercises.    Standing hamstring stretch: Put the heel of one leg on a stool about 15 inches high. Keep your leg straight. Lean forward, bending at the hips until you feel a mild stretch in the back of your thigh. Make sure you do not roll your shoulders or bend at the waist when doing this. You want to stretch your leg, not your lower back. Hold the stretch for 15 to 30 seconds. Repeat with each leg 3 times.    Cat and camel: Get down on your hands and knees. Let your stomach sag, allowing your back to curve downward. Hold this position for 5 seconds. Then arch your back and hold for 5 seconds. Do 2 sets of 15.    Quadruped arm and leg raise: Get down on your hands and knees. Pull in your belly button and tighten your abdominal muscles to stiffen your spine. While keeping your abdominals tight, raise one arm and the opposite leg away from you. Hold this position for 5 seconds. Lower your arm and leg slowly and change sides. Do this 10 times on each side.    Pelvic tilt: Lie on your back with your knees bent and your feet flat on the floor. Pull your belly button in towards your spine and push your lower back into the floor, flattening your back. Hold this position for 15 seconds, then relax. Repeat 5 to 10 times.  Partial curl: Lie on your back with your knees bent and your feet flat on the floor. Draw in your abdomen and tighten your stomach muscles. With your hands stretched out in front of you, curl your upper body forward until your shoulders clear the floor. Hold this position for 3 seconds. Don't hold your breath. It helps to breathe out as you lift your shoulders. Relax back to the floor. Repeat 10 times. Build to 2 sets of 15. To challenge yourself, clasp your hands behind your head and keep your elbows out to your sides.    Gluteal stretch: Lie on your back with  both knees bent. Rest your right ankle over the knee of your left leg. Grasp the thigh of the left leg and pull toward your chest. You will feel a stretch along the buttocks and possibly along the outside of your hip. Hold the stretch for 15 to 30 seconds. Then repeat the exercise with your left ankle over your right knee. Do the exercise 3 times with each leg.    Extension exercise  Lie face down on the floor for 5 minutes. If this hurts too much, lie face down with a pillow under your stomach. This should relieve your leg or back pain. When you can lie on your stomach for 5 minutes without a pillow, you can continue with Part B of this exercise.    After lying on your stomach for 5 minutes, prop yourself up on your elbows for another 5 minutes. If you can do this without having more leg or buttock pain, you can start doing part C of this exercise.    Lie on your stomach with your hands under your shoulders. Then press down on your hands and extend your elbows while keeping your hips flat on the floor. Hold for 1 second and lower yourself to the floor. Do 3 to 5 sets of 10 repetitions. Rest for 1 minute between sets. You should have no pain in your legs when you do this, but it is normal to feel some pain in your lower back.    Do this exercise several times a day.    Side plank: Lie on your side with your legs, hips, and shoulders in a straight line. Prop yourself up onto your forearm with your elbow directly under your shoulder. Lift your hips off the floor and balance on your forearm and the outside of your foot. Try to hold this position for 15 seconds and then slowly lower your hip to the ground. Switch sides and repeat. Work up to holding for 1 minute. This exercise can be made easier by starting with your knees and hips flexed toward your chest.    EXERCISES TO AVOID     It s best to avoid the following exercises because they strain the lower back:  Exercises in which you lie on your back and raise and lower  both legs together  Full sit-ups or sit-ups with straight legs  Hip twists    SPORTS AND OTHER ACTIVITIES    In addition to strengthening your back muscles, it s helpful to keep your entire body in shape. Good activities for people with back problems include:    Walking  Bicycling  Swimming  Cross-country skiing  Yoga  [employer]  Pilates    Some sports can hurt your back because of rough contact, twisting, sudden impact, or direct stress on your back. Sports that may be dangerous to your back include:    Football  Soccer  Volleyball  Handball  Golf  Weight lifting  Trampoline  Tobogganing  Sledding  Snowmobiling  Snowboarding  Ice hockey

## 2023-05-30 NOTE — PROGRESS NOTES
HISTORY OF PRESENT ILLNESS  Mr. Bhagat is a pleasant 57 year old year old male who presents to clinic today with the following:  What problem are you here for follow up for chronic bilateral low back pain.  He states he is experiencing pain, tingling and numbness that will radiate into both feet.    How long have you had this problem: chronic     Have you had any recent imaging of this problem? Xrays/MRI/CT scans: No     Have you had treatments for this problem in the past?  -Medications: patient will use oxycodone prn.   -Physical therapy: No   -Injections: lumbar DANNY administered on 2/15/2023, he states this injection was helpful for 1.5 months.  -Surgery: No     How severe is this problem today? 0-10 scale: 10/10    Does this problem or its symptoms cause difficulty for you falling asleep or staying asleep: Yes    Anything else you want us to know about this problem: Patient reports that he has bilateral foot pain/symptoms. He has a difficult time knowing if his foot pain is separate from his lumbar spine symptoms. He states he was ordered custom orthotics that are helpfully ready within the next two weeks.     MEDICAL HISTORY  Patient Active Problem List   Diagnosis     Carpal tunnel syndrome     Testicular hypofunction     Sicca syndrome (H)     Hepatocellular carcinoma (H)     Osteoarthritis     Rheumatoid arthritis (H)     Hyperkalemia     Liver transplant recipient (H)     Immunosuppressed status (H)     Neutropenic colitis (H)     S/P liver transplant (H)     Pancytopenia (H)     Ischemia of both lower extremities     Elevated serum creatinine     Abscess, intra-abdominal, postoperative     Liver transplant rejection (H)     CMV colitis (H)     Type 2 diabetes mellitus with diabetic polyneuropathy, with long-term current use of insulin (H)     Diarrhea of infectious origin (Plesiomonas shigelloides (formerly known Aeromonas shigelloides) in 3/14/2019 sample)     Hypertension secondary to other renal  disorders     Chronic kidney disease, stage 3 (H)     Morbid obesity (H)     F11.9 - Chronic, continuous use of opioids     Fibromyalgia       Current Outpatient Medications   Medication Sig Dispense Refill     alcohol swab prep pads Use to swab area of injection/francisca as directed. 100 each 3     alpha-lipoic acid 600 MG capsule Take 600 mg by mouth       amLODIPine (NORVASC) 10 MG tablet Take 1 tablet (10 mg) by mouth daily 90 tablet 3     augmented betamethasone dipropionate (DIPROLENE-AF) 0.05 % external ointment Apply twice daily as needed for rash on the legs 45 g 11     blood glucose (NO BRAND SPECIFIED) lancets standard Use to test blood sugar 1 times daily or as directed. 100 lancet 3     blood glucose (NO BRAND SPECIFIED) test strip Use to test blood sugar 1 times daily or as directed. 50 strip 11     blood glucose calibration (NO BRAND SPECIFIED) solution Use to calibrate meter. 1 Bottle 3     Blood Glucose Monitoring Suppl (CONTOUR BLOOD GLUCOSE SYSTEM) w/Device KIT For use with the Omnipod Dash insulin pump 1 kit 1     cholecalciferol (VITAMIN D3) 1000 UNIT tablet Take 1 tablet (1,000 Units) by mouth daily (Patient taking differently: Take 1,000 Units by mouth every morning) 100 tablet 3     Continuous Blood Gluc Sensor (DEXCOM G6 SENSOR) MISC Change every 10 days. 9 each 3     Continuous Blood Gluc Transmit (DEXCOM G6 TRANSMITTER) MISC Change every 3 months. 1 each 3     cyclobenzaprine (FLEXERIL) 10 MG tablet Take 1 tablet (10 mg) by mouth 3 times daily as needed for muscle spasms 90 tablet 3     doxazosin (CARDURA) 2 MG tablet Take 3 tablets (6 mg) by mouth At Bedtime 270 tablet 3     DULoxetine (CYMBALTA) 30 MG capsule Take 1 capsule (30 mg) by mouth daily 30 capsule 1     fluticasone (FLONASE) 50 MCG/ACT nasal spray Spray 1 spray into both nostrils daily 20 mL 3     furosemide (LASIX) 40 MG tablet Take 60 mg AM and 40 mg afternoon 180 tablet 3     gabapentin (NEURONTIN) 800 MG tablet Take 1 tablet  (800 mg) by mouth 3 times daily 90 tablet 11     hydrOXYzine (ATARAX) 25 MG tablet Take 1 tablet (25 mg) by mouth 3 times daily as needed for itching 90 tablet 3     Insulin Disposable Pump (OMNIPOD 5 G6 POD, GEN 5,) MISC 1 each See Admin Instructions Change every 2 days. Use for insulin administration. 45 each 3     Insulin Disposable Pump (OMNIPOD 5 G6 POD, GEN 5,) MISC 1 each See Admin Instructions Use per 's instructions. 1 each 1     Insulin Lispro (HUMALOG KWIKPEN) 200 UNIT/ML soln To be used in the insulin pump. Total daily dose up to 170 U. 72 mL 3     insulin pen needle (BD ENE U/F) 32G X 4 MM miscellaneous Use 1 pen needle 4 times daily or as directed. 400 each 1     lidocaine (LIDODERM) 5 % patch Place 1 patch onto the skin every 24 hours To prevent lidocaine toxicity, patient should be patch free for 12 hrs daily. 30 patch 11     losartan (COZAAR) 25 MG tablet Take 1 tablet (25 mg) by mouth daily 90 tablet 3     metoprolol succinate ER (TOPROL XL) 200 MG 24 hr tablet Take 1 tablet (200 mg) by mouth daily 90 tablet 3     mycophenolic acid (GENERIC EQUIVALENT) 360 MG EC tablet Take 2 tablets (720 mg) by mouth every 12 hours 360 tablet 3     oxyCODONE (ROXICODONE) 5 MG tablet Take 1 tablet (5 mg) by mouth 4 times daily as needed for pain maximum 6 tablet(s) per day 30 tablet 0     patiromer (VELTASSA) 8.4 g packet Take 8.4 g by mouth daily 90 each 3     predniSONE (DELTASONE) 2.5 MG tablet Take 1 tablet (2.5 mg) by mouth daily 90 tablet 3     sildenafil (REVATIO) 20 MG tablet Take 2 tablets (40 mg) by mouth daily as needed (erectile dysfunction) 10 tablet 11     tacrolimus (GENERIC EQUIVALENT) 1 MG capsule Take 4 capsules (4 mg) by mouth every morning AND 3 capsules (3 mg) every evening. 630 capsule 3     tretinoin (RETIN-A) 0.025 % external cream Apply a pea-sized amount evenly over the face at nighttime before bed 45 g 11     triamcinolone (KENALOG) 0.1 % external ointment Apply topically 2  times daily to the itching on the legs 80 g 1     ursodiol (ACTIGALL) 500 MG tablet Take 1 tablet (500 mg) by mouth 2 times daily 180 tablet 3       Allergies   Allergen Reactions     Erythromycin GI Disturbance     Losartan Other (See Comments)     Consistently develops hyperkalemia     Vioxx      Nausea, vomiting       Family History   Problem Relation Age of Onset     Diabetes Father      Hypertension Father      Substance Abuse Father      Arthritis Mother      Thyroid Cancer Mother         Survivor!     Cervical Cancer Maternal Grandmother      Cerebrovascular Disease Maternal Grandfather      No Known Problems Paternal Grandmother      Prostate Cancer Paternal Grandfather      Colon Cancer No family hx of      Hyperlipidemia No family hx of      Coronary Artery Disease No family hx of      Breast Cancer No family hx of      Social History     Socioeconomic History     Marital status:      Number of children: 1   Occupational History     Occupation: [employer]    Tobacco Use     Smoking status: Former     Packs/day: 0.25     Years: 2.00     Pack years: 0.50     Types: Cigarettes     Quit date:      Years since quittin.4     Smokeless tobacco: Former     Types: Chew     Quit date: 10/8/2015     Tobacco comments:     1 Cigar once every other month.   Substance and Sexual Activity     Alcohol use: No     Alcohol/week: 0.0 standard drinks of alcohol     Comment: No alcohol since liver transplant.       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           Additional medical/Social/Surgical histories reviewed in [employer] and updated as appropriate.     REVIEW OF SYSTEMS (2023)  10 point ROS of systems including Constitutional, Eyes, Respiratory, Cardiovascular, Gastroenterology, Genitourinary, Integumentary, Musculoskeletal, Psychiatric, Allergic/Immunologic were all negative except for  "pertinent positives noted in my HPI.     PHYSICAL EXAM  VSS  Vital Signs: Ht 1.829 m (6' 0.01\")   Wt 132.9 kg (293 lb)   BMI 39.73 kg/m   Patient declined being weighed. Body mass index is 39.73 kg/m .    General  - normal appearance, in no obvious distress  HEENT  - conjunctivae not injected, moist mucous membranes, normocephalic/atraumatic head, ears normal appearance, no lesions, mouth normal appearance, no scars, normal dentition and teeth present  CV  - normal peripheral perfusion  Pulm  - normal respiratory pattern, non-labored  Musculoskeletal - lumbar spine  - stance: normal gait without limp, no obvious leg length discrepancy, normal heel and toe walk  - inspection: normal bone and joint alignment, no obvious scoliosis  - palpation: no paravertebral or bony tenderness  - ROM: flexion exacerbates pain, normal extension, sidebending, rotation  - strength: lower extremities 5/5 in all planes  - special tests:  (+) straight leg raise  (+) slump test  Neuro  - patellar and Achilles DTRs 2+ bilaterally, lower extremity sensory deficit throughout L5 distribution, grossly normal coordination, normal muscle tone  Skin  - no ecchymosis, erythema, warmth, or induration, no obvious rash  Psych  - interactive, appropriate, normal mood and affect    ASSESSMENT & PLAN  58 yo male with lumbar ddd, disc herniations, radicular pain, worse    I independently reviewed the following imaging studies:  Lumbar MRI: shows ddd, disc herniations  Discussed and ordered tan  Cont. HEP and PT exercises  Follow-up/ after TAN  Patient has been doing home exercise physical therapy program for this problem      Appropriate PPE was utilized for prevention of spread of Covid-19.  Ronaldo Doty MD, CAQSM    "

## 2023-06-03 NOTE — TELEPHONE ENCOUNTER
Camacho sent a [employer] ([employer]) message requesting imaging for bilateral hip pain. Dr. Camejo's note from 6/3/2020 states that Camacho is experiencing bilateral hip pain and that he will discuss with Dr. Kirkpatrick and begin an orthopedic evaluation.     I sent a message to Camacho regarding Dr. Camejo's recommendation at that time and asked if any further assistance was needed from his liver transplant team.   81 year old man with CAD w/ stents, Stage C HFrEF (25-30%) NYHA III, ESRD, pancreatic cancer, T2DM, PAD, COPD, HTN, recent rt foot ischemia with L femoral to R femoral bypass who is presenting for intermittent chest discomfort and LHC at [facility] showing 95% occlusion of proximal and mLAD and 85% of ostial ramus. Troponin 2382    ECG 6/2/23 - NSR, RBBB, q waves in III and aVF  LHC 5/22/23 95% occlusion pLAD and mLAD, 85% ostial ramus  TTE (5/15/23) showing EF 25-30% with apical hypokinesis, moderate AS, mild MR and TR.   TTE 6/8/22 - EF 55-60%    #NSTEMI  Elevated Troponin T, q waves in III and aVF  - has not had further chest pain yesterday or overnight  - c/w DAPT  - c/w statin  - plan for LHC on 6/5/23  - continue to trend cardiac enzymes    #Stage C HFrEF (25-30%)  Ischemic cardiomyopathy, NYHA III, Elevated JVP, pro BNP - 134k, lactate 3.0  - c/w metoprolol succinate 75 mg PO QD  - HD per nephrology for volume optimization  - please repeat lactate    #Aortic Stenosis  - moderate on TTE  - f/u with outpatient       81 year old man with CAD w/ stents, Stage C HFrEF (25-30%) NYHA III, ESRD, pancreatic cancer, T2DM, PAD, COPD, HTN, recent rt foot ischemia with L femoral to R femoral bypass who is presenting for intermittent chest discomfort and LHC at [facility] showing 95% occlusion of proximal and mLAD and 85% of ostial ramus. Troponin 2382    ECG 6/2/23 - NSR, RBBB, q waves in III and aVF  LHC 5/22/23 95% occlusion pLAD and mLAD, 85% ostial ramus  TTE (5/15/23) showing EF 25-30% with apical hypokinesis, moderate AS, mild MR and TR.   TTE 6/8/22 - EF 55-60%    #NSTEMI  Elevated Troponin T, q waves in III and aVF  - has not had further chest pain yesterday or overnight  - c/w DAPT  - c/w statin  - plan for LHC on 6/5/23  - continue to trend cardiac enzymes    #Stage C HFrEF (25-30%)  Ischemic cardiomyopathy, NYHA III, Elevated JVP, pro BNP - 134k, lactate 3.0  - c/w metoprolol succinate 75 mg PO QD  - HD per nephrology for volume optimization  - please repeat lactate    #Aortic Stenosis  - moderate on TTE  - f/u with outpatient    This patient was seen and examined personally by me and the plan was discussed with the fellow and/or resident above. Amendments were made as necessary to the above. Agree with the excellent note and plan above. LHC pending monday.    Richard Negron MD, MPhil, [facility]  Cardiologist, [facility]  ; [facility] and [facility]  Email: [email or web address] on-service contact/call information, go to [email or web address] and use "[email or web address]" to login.  Outpatient Cardiology appointments, call [phone] to arrange with a colleague; I do not have outpatient Cardiology clinic.

## 2023-06-07 ENCOUNTER — HOSPITAL ENCOUNTER (OUTPATIENT)
Dept: GENERAL RADIOLOGY | Facility: CLINIC | Age: 58
Discharge: HOME | End: 2023-06-07
Attending: PREVENTIVE MEDICINE | Admitting: PREVENTIVE MEDICINE
Payer: COMMERCIAL

## 2023-06-07 VITALS
RESPIRATION RATE: 18 BRPM | DIASTOLIC BLOOD PRESSURE: 68 MMHG | OXYGEN SATURATION: 99 % | SYSTOLIC BLOOD PRESSURE: 159 MMHG | HEART RATE: 65 BPM

## 2023-06-07 DIAGNOSIS — M51.16 LUMBAR DISC HERNIATION WITH RADICULOPATHY: ICD-10-CM

## 2023-06-07 PROCEDURE — 250N000009 HC RX 250

## 2023-06-07 PROCEDURE — 62323 NJX INTERLAMINAR LMBR/SAC: CPT

## 2023-06-07 PROCEDURE — 255N000002 HC RX 255 OP 636: Performed by: PREVENTIVE MEDICINE

## 2023-06-07 PROCEDURE — 250N000011 HC RX IP 250 OP 636

## 2023-06-07 RX ORDER — LIDOCAINE HYDROCHLORIDE 10 MG/ML
INJECTION, SOLUTION EPIDURAL; INFILTRATION; INTRACAUDAL; PERINEURAL
Status: COMPLETED
Start: 2023-06-07 | End: 2023-06-07

## 2023-06-07 RX ORDER — IOPAMIDOL 408 MG/ML
10 INJECTION, SOLUTION INTRATHECAL ONCE
Status: COMPLETED | OUTPATIENT
Start: 2023-06-07 | End: 2023-06-07

## 2023-06-07 RX ORDER — LIDOCAINE HYDROCHLORIDE 10 MG/ML
10 INJECTION, SOLUTION EPIDURAL; INFILTRATION; INTRACAUDAL; PERINEURAL ONCE
Status: COMPLETED | OUTPATIENT
Start: 2023-06-07 | End: 2023-06-07

## 2023-06-07 RX ORDER — BETAMETHASONE SODIUM PHOSPHATE AND BETAMETHASONE ACETATE 3; 3 MG/ML; MG/ML
18 INJECTION, SUSPENSION INTRA-ARTICULAR; INTRALESIONAL; INTRAMUSCULAR; SOFT TISSUE ONCE
Status: COMPLETED | OUTPATIENT
Start: 2023-06-07 | End: 2023-06-07

## 2023-06-07 RX ORDER — BETAMETHASONE SODIUM PHOSPHATE AND BETAMETHASONE ACETATE 3; 3 MG/ML; MG/ML
INJECTION, SUSPENSION INTRA-ARTICULAR; INTRALESIONAL; INTRAMUSCULAR; SOFT TISSUE
Status: COMPLETED
Start: 2023-06-07 | End: 2023-06-07

## 2023-06-07 RX ADMIN — IOPAMIDOL 2 ML: 408 INJECTION, SOLUTION INTRATHECAL at 13:24

## 2023-06-07 RX ADMIN — LIDOCAINE HYDROCHLORIDE 5 ML: 10 INJECTION, SOLUTION EPIDURAL; INFILTRATION; INTRACAUDAL; PERINEURAL at 13:23

## 2023-06-07 RX ADMIN — BETAMETHASONE SODIUM PHOSPHATE AND BETAMETHASONE ACETATE 18 MG: 3; 3 INJECTION, SUSPENSION INTRA-ARTICULAR; INTRALESIONAL; INTRAMUSCULAR; SOFT TISSUE at 13:25

## 2023-06-07 RX ADMIN — BETAMETHASONE SODIUM PHOSPHATE AND BETAMETHASONE ACETATE 18 MG: 3; 3 INJECTION, SUSPENSION INTRA-ARTICULAR; INTRALESIONAL; INTRAMUSCULAR at 13:25

## 2023-06-07 NOTE — PROGRESS NOTES
Patient tolerated right L2-L3 translaminar DANNY well by Nitish GONZALEZ.  Site Clean Dry and intact, dressing applied with no drainage.  Patient rated pre procedural pain at 9/10 and post pain at 5/10 in back and 0/10 pain in bilateral legs.  Patient home per self, understands written and oral instructions.    Christi Rae, RN, BSN

## 2023-06-07 NOTE — PROGRESS NOTES
RADIOLOGY PROCEDURE NOTE  Patient name: Camacho Bhagat  MRN: [id number]  : 1965    Pre-procedure diagnosis: Back pain with radiculopathy  Post-procedure diagnosis: Same    Procedure Date/Time: 2023  1:17 PM  Procedure: Right L2-L3 TLESI  Estimated blood loss: None  Specimen(s) collected with description: none  The patient tolerated the procedure well with no immediate complications.  Significant findings:none    See imaging dictation for procedural details.    Provider name: Nitish Keyes PA-C  Assistant(s):None

## 2023-06-09 DIAGNOSIS — L29.9 PRURITIC DISORDER: ICD-10-CM

## 2023-06-13 RX ORDER — HYDROXYZINE HYDROCHLORIDE 25 MG/1
TABLET, FILM COATED ORAL
Qty: 90 TABLET | Refills: 3 | OUTPATIENT
Start: 2023-06-13

## 2023-06-22 ENCOUNTER — VIRTUAL VISIT (OUTPATIENT)
Dept: ORTHOPEDICS | Facility: CLINIC | Age: 58
End: 2023-06-22
Payer: COMMERCIAL

## 2023-06-22 DIAGNOSIS — M51.16 LUMBAR DISC HERNIATION WITH RADICULOPATHY: Primary | ICD-10-CM

## 2023-06-22 DIAGNOSIS — M51.369 DDD (DEGENERATIVE DISC DISEASE), LUMBAR: ICD-10-CM

## 2023-06-22 PROCEDURE — 99213 OFFICE O/P EST LOW 20 MIN: CPT | Mod: 93 | Performed by: PREVENTIVE MEDICINE

## 2023-06-22 NOTE — LETTER
6/22/2023       RE: Camacho Bhagat  [address]  [place] [address]     Dear Colleague,    Thank you for referring your patient, Camacho Bhagat, to the [facility] [place] at [facility]. Please see a copy of my visit note below.    Patient is a  57   year old who is being evaluated via a billable telephone visit.      What phone number would you like to be contacted at? CELL  How would you like to obtain your AVS? MYCHART        Subjective   Patient is a   57  year old who presents by phone call visit for the following:     HPI   F/u for lumbar injection  Overall feeing better  Bilateral leg pains and tingling resolved  Still has some low back pain  Occasionally using cyclobenzaprine      Review of Systems   Constitutional, HEENT, cardiovascular, pulmonary, gi and gu systems are negative, except as otherwise noted.      Objective           Vitals:  No vitals were obtained today due to virtual visit.    Physical Exam   healthy, alert and no distress  PSYCH: Alert and oriented times 3; coherent speech, normal   rate and volume, able to articulate logical thoughts, able   to abstract reason, no tangential thoughts, no hallucinations   or delusions  His affect is normal  RESP: No cough, no audible wheezing, able to talk in full sentences  Remainder of exam unable to be completed due to telephone visits    Assessment/Plan  58 yo male with lumbar ddd, disc herniations, radicular pain, improving    I independently reviewed the following imaging studies and discussed with patient:  Lumbar MRI: shows ddd, disc herniations  Discussed will f/u 4 weeks and consider repeat DANNY later this summer/fall if needed  Cont. Medications as prescribed          Phone call duration: 20 minutes  Phone call start: 200pm  Phone call end: 220pm  Dr Doty

## 2023-06-22 NOTE — PROGRESS NOTES
Patient is a  57   year old who is being evaluated via a billable telephone visit.      What phone number would you like to be contacted at? CELL  How would you like to obtain your AVS? KAIA        Subjective   Patient is a   57  year old who presents by phone call visit for the following:     HPI   F/u for lumbar injection  Overall feeing better  Bilateral leg pains and tingling resolved  Still has some low back pain  Occasionally using cyclobenzaprine      Review of Systems   Constitutional, HEENT, cardiovascular, pulmonary, gi and gu systems are negative, except as otherwise noted.      Objective           Vitals:  No vitals were obtained today due to virtual visit.    Physical Exam   healthy, alert and no distress  PSYCH: Alert and oriented times 3; coherent speech, normal   rate and volume, able to articulate logical thoughts, able   to abstract reason, no tangential thoughts, no hallucinations   or delusions  His affect is normal  RESP: No cough, no audible wheezing, able to talk in full sentences  Remainder of exam unable to be completed due to telephone visits    Assessment/Plan  58 yo male with lumbar ddd, disc herniations, radicular pain, improving    I independently reviewed the following imaging studies and discussed with patient:  Lumbar MRI: shows ddd, disc herniations  Discussed will f/u 4 weeks and consider repeat DANNY later this summer/fall if needed  Cont. Medications as prescribed          Phone call duration: 20 minutes  Phone call start: 200pm  Phone call end: 220pm  Dr Doty

## 2023-06-23 ENCOUNTER — VIRTUAL VISIT (OUTPATIENT)
Dept: SLEEP MEDICINE | Facility: CLINIC | Age: 58
End: 2023-06-23
Payer: COMMERCIAL

## 2023-06-23 VITALS — HEIGHT: 72 IN | WEIGHT: 285 LBS | BODY MASS INDEX: 38.6 KG/M2

## 2023-06-23 DIAGNOSIS — G47.33 OSA (OBSTRUCTIVE SLEEP APNEA): Primary | ICD-10-CM

## 2023-06-23 PROCEDURE — 99215 OFFICE O/P EST HI 40 MIN: CPT | Mod: VID | Performed by: NURSE PRACTITIONER

## 2023-06-23 ASSESSMENT — SLEEP AND FATIGUE QUESTIONNAIRES
HOW LIKELY ARE YOU TO NOD OFF OR FALL ASLEEP WHILE SITTING AND READING: WOULD NEVER DOZE
HOW LIKELY ARE YOU TO NOD OFF OR FALL ASLEEP WHILE SITTING QUIETLY AFTER LUNCH WITHOUT ALCOHOL: WOULD NEVER DOZE
HOW LIKELY ARE YOU TO NOD OFF OR FALL ASLEEP WHILE SITTING AND TALKING TO SOMEONE: WOULD NEVER DOZE
HOW LIKELY ARE YOU TO NOD OFF OR FALL ASLEEP WHILE WATCHING TV: WOULD NEVER DOZE
HOW LIKELY ARE YOU TO NOD OFF OR FALL ASLEEP WHILE LYING DOWN TO REST IN THE AFTERNOON WHEN CIRCUMSTANCES PERMIT: WOULD NEVER DOZE
HOW LIKELY ARE YOU TO NOD OFF OR FALL ASLEEP IN A CAR, WHILE STOPPED FOR A FEW MINUTES IN TRAFFIC: WOULD NEVER DOZE
HOW LIKELY ARE YOU TO NOD OFF OR FALL ASLEEP WHILE SITTING INACTIVE IN A PUBLIC PLACE: WOULD NEVER DOZE
HOW LIKELY ARE YOU TO NOD OFF OR FALL ASLEEP WHEN YOU ARE A PASSENGER IN A CAR FOR AN HOUR WITHOUT A BREAK: WOULD NEVER DOZE

## 2023-06-23 ASSESSMENT — PAIN SCALES - GENERAL: PAINLEVEL: SEVERE PAIN (6)

## 2023-06-23 NOTE — PROGRESS NOTES
Virtual Visit Details    Type of service:  Video Visit   Video Start Time: 1:31 PM  Video End Time:  2:01 PM    Originating Location (pt. Location): Home    Distant Location (provider location):  Off-site  Platform used for Video Visit: Logan      Sleep Apnea - Follow-up Visit:    Impression/Plan:  1. RONNIE (obstructive sleep apnea)  - Comprehensive DME    Severe Sleep apnea. Tolerating PAP well. Daytime symptoms are improved.  The patient continues to use benefit from PAP therapy.  He is doing well on PAP therapy and has no new concerns or complaints at this time.    A review of his APAP download data shows excellent use and compliance and severe RONNIE that is well controlled on current pressure setting.    Continue current plan of care. A comprehensive DME order was placed for new nasal pillow mask and supplies and sent to [facility] medical equipment today.    Camacho Bhagat will follow up in about 1 year(s).     Forty minutes spent with patient, all of which were spent face-to-face counseling, consulting, chart review/documentation, and coordinating plan of care on the date of the encounter.      LAMIN Sarmiento CNP  Sleep Medicine    CC:  Shay Kirkpatrick,         History of Present Illness:  Chief Complaint   Patient presents with     RECHECK     CPAP Follow Up     Annual       Camacho Bhagat is a 57-year-old male with a PMH pertinent for HTN, DM 2, hepatocellular cancer - s/p transplant with rejection, DVT with IVC filter, rheumatoid arthritis, history of septic shock with distal ischemia, and obesity who presents for annual follow-up of their severe sleep apnea, managed with CPAP.  He was last seen with Dr. Wilcox on 7/6/2022 in an office visit for annual follow-up RONNIE, managed with CPAP.  Today, he reports that he is doing well on PAP therapy and has no immediate concerns or complaints.  We will      Previous Study Results: [facility] - PSG S/N  Date: 4/27/2022.  Weight 264 pounds.  AHI: 79.8/hr. RDI 80.3/hr. O2 tiffany  67%.  Tx: The final pressure was CPAP 14 cmH2O with an AHI of 0.5 events per hour. Time in REM supine on final pressure was 68.0 minutes    DME: [facility]    Do you use a CPAP Machine at home:  Yes  Overall, on a scale of 0-10 how would you rate your CPAP (0 poor, 10 great):  10    What type of mask do you use:  Nasal pillows  Is your mask comfortable:  Yes  If not, why:      Is your mask leaking:  Yes  If yes, where do you feel it:  nose  How many night per week does the mask leak (0-7):  7    Do you notice snoring with mask on:  No  Do you notice gasping arousals with mask on:  No  Are you having significant oral or nasal dryness:  No  Is the pressure setting comfortable:  Yes  If not, why:      What is your typical bedtime:  12-1 AM  How long does it take you to go to sleep on PAP therapy:  10-15 minutes  What time do you typically get out of bed for the day:  7:30-8 AM  How many hours on average per night are you using PAP therapy:  8  How many hours are you sleeping per night:  7-8  Do you feel well rested in the morning:  Yes      ResMed AirSense 10  Auto-PAP 5.0 - 20.0 cmH2O 30 day usage data:  5/23/23 - 6/21/23  100% of days with > 4 hours of use. 0/30 days with no use.   Average use 582 minutes per day.   95%ile Leak 16.88 L/min.   CPAP 95% pressure 13.4 cm.   AHI 0.57 events per hour.        EPWORTH SLEEPINESS SCALE         6/23/2023     1:04 PM    Whitney Sleepiness Scale ( NILAM Priest  1990-1997[facility] [place]/English - Final version - 21 Nov 07 - [facility] [place].)   Sitting and reading Would never doze   Watching TV Would never doze   Sitting, inactive in a public place (e.g. a theatre or a meeting) Would never doze   As a passenger in a car for an hour without a break Would never doze   Lying down to rest in the afternoon when circumstances permit Would never doze   Sitting and talking to someone Would never doze   Sitting quietly after a lunch without alcohol Would never doze   In a car, while stopped for  a few minutes in traffic Would never doze   Bovill Score (MC) 0   Bovill Score (Sleep) 0       INSOMNIA SEVERITY INDEX (JOANN)          6/23/2023     1:03 PM   Insomnia Severity Index (JOANN)   Difficulty falling asleep 0   Difficulty staying asleep 0   Problems waking up too early 0   How SATISFIED/DISSATISFIED are you with your CURRENT sleep pattern? 0   How NOTICEABLE to others do you think your sleep problem is in terms of impairing the quality of your life? 0   How WORRIED/DISTRESSED are you about your current sleep problem? 0   To what extent do you consider your sleep problem to INTERFERE with your daily functioning (e.g. daytime fatigue, mood, ability to function at work/daily chores, concentration, memory, mood, etc.) CURRENTLY? 0   JOANN Total Score 0       Guidelines for Scoring/Interpretation:  Total score categories:  0-7 = No clinically significant insomnia   8-14 = Subthreshold insomnia   15-21 = Clinical insomnia (moderate severity)  22-28 = Clinical insomnia (severe)  Used via courtesy of www.ecoATMealth.va.gov with permission from Lenny Grijalva PhD., [facility]      Past medical/surgical history, family history, social history, medications and allergies were reviewed.        Problem List:  Patient Active Problem List    Diagnosis Date Noted     F11.9 - Chronic, continuous use of opioids 05/15/2023     Priority: Medium     Morbid obesity (H) 12/03/2021     Priority: Medium     Chronic kidney disease, stage 3 (H) 12/13/2020     Priority: Medium     Hypertension secondary to other renal disorders 05/14/2019     Priority: Medium     Diarrhea of infectious origin (Plesiomonas shigelloides (formerly known Aeromonas shigelloides) in 3/14/2019 sample) 03/25/2019     Priority: Medium     Type 2 diabetes mellitus with diabetic polyneuropathy, with long-term current use of insulin (H) 09/10/2018     Priority: Medium     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   Plan:  Increase tacrolimus dose, currently on low dose tacrolimus with level < 3.0.  Plan to increase for goal = 8.    Also on myfortic 720mg Q 12 hours.   Prednisone 2.5mg q day.           Abscess, intra-abdominal, postoperative 02/13/2018     Priority: Medium     Elevated serum creatinine 10/16/2017     Priority: Medium     Pancytopenia (H) 08/25/2017     Priority: Medium     Ischemia of both lower extremities 08/25/2017     Priority: Medium     Distal ischemia due to shock/high pressor requirements       S/P liver transplant (H) 07/26/2017     Priority: Medium     Neutropenic colitis (H) 07/04/2017     Priority: Medium     Immunosuppressed status (H) 03/10/2017     Priority: Medium     Liver transplant recipient (H) 03/04/2017     Priority: Medium     Hyperkalemia 02/14/2017     Priority: Medium     Hepatocellular carcinoma (H) 01/27/2016     Priority: Medium     Osteoarthritis 01/18/2015     Priority: Medium     Rheumatoid arthritis (H) 01/18/2015     Priority: Medium     Sicca syndrome (H) 08/15/2011     Priority: Medium     Fibromyalgia 01/2009     Priority: Medium     dx with Dr Benitez( Rheum)       Testicular hypofunction      Priority: Medium     Problem list name updated by automated process. Provider to review       Carpal tunnel syndrome 07/08/2002     Priority: Medium      Current Outpatient Medications   Medication     alcohol swab prep pads     alpha-lipoic acid 600 MG capsule     amLODIPine (NORVASC) 10 MG tablet     augmented betamethasone dipropionate (DIPROLENE-AF) 0.05 % external ointment     blood glucose (NO BRAND SPECIFIED) lancets standard     blood glucose (NO BRAND SPECIFIED) test strip     blood glucose calibration (NO BRAND SPECIFIED) solution     Blood Glucose Monitoring Suppl (CONTOUR BLOOD GLUCOSE SYSTEM) w/Device KIT     cholecalciferol (VITAMIN D3) 1000 UNIT tablet     Continuous Blood Gluc Sensor (DEXCOM G6  SENSOR) MISC     Continuous Blood Gluc Transmit (DEXCOM G6 TRANSMITTER) MISC     cyclobenzaprine (FLEXERIL) 10 MG tablet     doxazosin (CARDURA) 2 MG tablet     DULoxetine (CYMBALTA) 30 MG capsule     fluticasone (FLONASE) 50 MCG/ACT nasal spray     furosemide (LASIX) 40 MG tablet     gabapentin (NEURONTIN) 800 MG tablet     hydrOXYzine (ATARAX) 25 MG tablet     Insulin Disposable Pump (OMNIPOD 5 G6 POD, GEN 5,) MISC     Insulin Disposable Pump (OMNIPOD 5 G6 POD, GEN 5,) MISC     Insulin Lispro (HUMALOG KWIKPEN) 200 UNIT/ML soln     insulin pen needle (BD ENE U/F) 32G X 4 MM miscellaneous     lidocaine (LIDODERM) 5 % patch     metoprolol succinate ER (TOPROL XL) 200 MG 24 hr tablet     mycophenolic acid (GENERIC EQUIVALENT) 360 MG EC tablet     oxyCODONE (ROXICODONE) 5 MG tablet     patiromer (VELTASSA) 8.4 g packet     predniSONE (DELTASONE) 2.5 MG tablet     sildenafil (REVATIO) 20 MG tablet     tacrolimus (GENERIC EQUIVALENT) 1 MG capsule     tretinoin (RETIN-A) 0.025 % external cream     triamcinolone (KENALOG) 0.1 % external ointment     ursodiol (ACTIGALL) 500 MG tablet     losartan (COZAAR) 25 MG tablet     No current facility-administered medications for this visit.       Ht 1.829 m (6')   Wt 129.3 kg (285 lb)   BMI 38.65 kg/m        This note was written with the assistance of the Dragon voice-dictation technology software. The final document, although reviewed, may contain errors. For corrections, please contact the office.

## 2023-06-23 NOTE — PROGRESS NOTES
"Virtual Visit Details    Type of service:  Video Visit   Video Start Time: {video visit start/end time for provider to select:826459}  Video End Time:{video visit start/end time for provider to select:286791}    Originating Location (pt. Location): {video visit patient location:660920::\"Home\"}  {PROVIDER LOCATION On-site should be selected for visits conducted from your clinic location or adjoining [facility] hospital, academic office, or other nearby [facility] building. Off-site should be selected for all other provider locations, including home:143771}  Distant Location (provider location):  {virtual location provider:623095}  Platform used for Video Visit: {Virtual Visit Platforms:233950::\"Digital Assent\"}  "

## 2023-06-23 NOTE — NURSING NOTE
Is the patient currently in the state of [place]? YES    Visit mode:VIDEO    If the visit is dropped, the patient can be reconnected by: VIDEO VISIT: Text to cell phone: [phone]    Will anyone else be joining the visit? NO      How would you like to obtain your AVS? MyChart    Are changes needed to the allergy or medication list? NO    Reason for visit: YEMI De Leon

## 2023-06-23 NOTE — PATIENT INSTRUCTIONS
"MY INFORMATION ON SLEEP APNEA-  Camacho Bhagat    DOCTOR : LAMIN Sarmiento CNP  SLEEP CENTER :      MY CONTACT NUMBER:   [facility]  [phone]  [facility]   [phone]  [facility]   [phone]      [facility]  [phone]  [facility]   [phone]    [facility] [place] [address], [address]  [place] [address]  Phone: [phone]    Hours:  Mon - Fri: 8:00 a.m. - 4:30 p.m.  Sat: Closed  Sun: Closed      Key Points:  1. What is Obstructive Sleep Apnea (RONNIE)? RONNIE is the most common type of sleep apnea. Apnea literally means, \"without breath.\" It is characterized by repetitive pauses in breathing, despite continued effort to breathe, and is usually associated with a reduction in blood oxygen saturation. Apneas can last 10 to over 60 seconds. It is caused by narrowing or collapse of the upper airway as muscles relax during sleep.   2. What are the consequences of RONNIE? Symptoms include: daytime sleepiness- possibly increasing the risk of falling asleep while driving, unrefreshing/restless sleep, snoring, insomnia, waking frequently to urinate, waking with heartburn or reflux, reduced concentration and memory, and morning headaches. Other health consequences may include development of high blood pressure. Untreated RONNIE also can contribute to heart disease, stroke and diabetes.   3. What are the treatment options? In most situations, sleep apnea is a lifelong disease that must be managed with daily therapy. Continuous Positive Airway (CPAP) is the most reliable treatment. A mouthguard to hold your jaw forward is usually the next most reliable option. Other options include postioning devices (to keep you off your back), nasal valves, tongue retaining device, weight loss, surgery. There is more detail about these options toward the end of this " document.  4. What are the most important things to remember about using CPAP?     WHERE CAN I FIND MORE INFORMATION?    American Academy of Sleep Medicine Patient information on sleep disorders:  http://yoursleep.aasmnet.org    CPAP -  WHY AND HOW?                 Continuous positive airway pressure, or CPAP, is the most effective treatment for obstructive sleep apnea. It works by blowing room air, through a mask, to hold your throat open. A decision to use CPAP is a major step forward in the pursuit of a healthier life. The successful use of CPAP will help you breathe easier, sleep better and live healthier. Using CPAP can be a positive experience if you keep these randolph points in mind:  Commitment  CPAP is not a quick fix for your problem. It involves a long-term commitment to improve your sleep and your health.    Communication  Stay in close communication with both your sleep doctor and your CPAP supplier. Ask lots of questions and seek help when you need it.    Consistency  Use CPAP all night, every night and for every nap. You will receive the maximum health benefits from CPAP when you use it every time that you sleep. This will also make it easier for your body to adjust to the treatment.    Correction  The first machine and mask that you try may not be the best ones for you. Work with your sleep doctor and your CPAP supplier to make corrections to your equipment selection. Ask about trying a different type of machine or mask if you have ongoing problems. Make sure that your mask is a good fit and learn to use your equipment properly.    Challenge  Tell a family member or close friend to ask you each morning if you used your CPAP the previous night. Have someone to challenge you to give it your best effort.    Connection   Your adjustment to CPAP will be easier if you are able to connect with others who use the same treatment. Ask your sleep doctor if there is a support group in your area for people who have  "sleep apnea, or look for one on the Internet.  Comfort   Increase your level of comfort by using a saline spray, decongestant or heated humidifier if CPAP irritates your nose, mouth or throat. Use your unit's \"ramp\" setting to slowly get used to the air pressure level. There may be soft pads you can buy that will fit over your mask straps. Look on www.CPAP.com for accessories that can help make CPAP use more comfortable.  Cleaning   Clean your mask, tubing and headgear on a regular basis. Put this time in your schedule so that you don't forget to do it. Check and replace the filters for your CPAP unit and humidifier.    Completion   Although you are never finished with CPAP therapy, you should reward yourself by celebrating the completion of your first month of treatment. Expect this first month to be your hardest period of adjustment. It will involve some trial and error as you find the machine, mask and pressure settings that are right for you.    Continuation  After your first month of treatment, continue to make a daily commitment to use your CPAP all night, every night and for every nap.    CPAP-Tips to starting with success:  Begin using your CPAP for short periods of time during the day while you watch TV or read.    Use CPAP every night and for every nap. Using it less often reduces the health benefits and makes it harder for your body to get used to it.    Newer CPAP models are virtually silent; however, if you find the sound of your CPAP machine to be bothersome, place the unit under your bed to dampen the sound.     Make small adjustments to your mask, tubing, straps and headgear until you get the right fit. Tightening the mask may actually worsen the leak.  If it leaves significant marks on your face or irritates the bridge of your nose, it may not be the best mask for you.  Speak with the person who supplied the mask and consider trying other masks. Insurances will allow you to try different masks " during the first month of starting CPAP.  Insurance also covers a new mask, hose and filter about every 6 months.    Use a saline nasal spray to ease mild nasal congestion. Neti-Pot or saline nasal rinses may also help. Nasal gel sprays can help reduce nasal dryness.  Biotene mouthwash can be helpful to protect your teeth if you experience frequent dry mouth.  Dry mouth may be a sign of air escaping out of your mouth or out of the mask in the case of a full face mask.  Speak with your provider if you expect that is the case.     Take a nasal decongestant to relieve more severe nasal or sinus congestion.  Do not use Afrin (oxymetazoline) nasal spray more than 3 days in a row.  Speak with your sleep doctor if your nasal congestion is chronic.    Use a heated humidifier that fits your CPAP model to enhance your breathing comfort. Adjust the heat setting up if you get a dry nose or throat, down if you get condensation in the hose or mask.  Position the CPAP lower than you so that any condensation in the hose drains back into the machine rather than towards the mask.    Try a system that uses nasal pillows if traditional masks give you problems.    Clean your mask, tubing and headgear once a week. Make sure the equipment dries fully.    Regularly check and replace the filters for your CPAP unit and humidifier.    Work closely with your sleep provider and your CPAP supplier to make sure that you have the machine, mask and air pressure setting that works best for you. It is better to stop using it and call your provider to solve problems than to lay awake all night frustrated with the device.  Weight Loss:    Weight loss decreases severity of sleep apnea in most people with obesity. For those with mild obesity who have developed snoring with weight gain, even 15-30 pound weight loss can improve and occasionally eliminate sleep apnea.  Structured and life-long dietary and health habits are necessary to lose weight and keep  healthier weight levels.     Though there are significant health benefits from weight loss, long-term weight loss is very difficult to achieve- studies show success with dietary management in less than 10% of people. In addition, substantial weight loss may require years of dietary control and may be difficult if patients have severe obesity. In these cases, surgical management may be considered.    If you are interested in methods for weight loss, you should review the options discussed at the National Institutes of Health patient information sites:     http://win.niddk.nih.gov/publications/index.htm  http:/www.health.nih.gov/topic/WeightLossDieting    Bariatric programs offer counseling in all methods of weight loss:    Http:/www.[facility].org/Specialties/WeightLossSurgeryandMedicalMgmt/htm    Your BMI is Body mass index is 38.65 kg/m .    Weight management plan: Patient was referred to their PCP to discuss a diet and exercise plan.    Body mass index (BMI) is one way to tell whether you are at a healthy weight, overweight, or obese. It measures your weight in relation to your height.  A BMI of 18.5 to 24.9 is in the healthy range. A person with a BMI of 25 to 29.9 is considered overweight, and someone with a BMI of 30 or greater is considered obese.  Another way to find out if you are at risk for health problems caused by overweight and obesity is to measure your waist. If you are a woman and your waist is more than 35 inches, or if you are a man and your waist is more than 40 inches, your risk of disease may be higher.  More than two-thirds of American adults are considered overweight or obese. Being overweight or obese increases the risk for further weight gain.  Excess weight may lead to heart disease and diabetes. Creating and following plans for healthy eating and physical activity may help you improve your health.    Methods for maintaining or losing weight.    Weight control is part of healthy  lifestyle and includes exercise, emotional health, and healthy eating habits.  Careful eating habits lifelong is the mainstay of weight control.  Though there are significant health benefits from weight loss, long-term weight loss with diet alone may be very difficult to achieve- studies show long-term success with dietary management in less than 10% of people. Attaining a healthy weight may be especially difficult to achieve in those with severe obesity. In some cases, medications, devices and surgical management might be considered.    What can you do?    If you are overweight or obese and are interested in methods for weight loss, you should discuss this with your provider. In addition, we recommend that you review healthy life styles and methods for weight loss available through the National Institutes of Health patient information sites:     http://win.niddk.nih.gov/publications/index.htm

## 2023-07-11 DIAGNOSIS — Z94.4 LIVER TRANSPLANT RECIPIENT (H): ICD-10-CM

## 2023-07-11 RX ORDER — TACROLIMUS 1 MG/1
CAPSULE ORAL
Qty: 630 CAPSULE | Refills: 3 | Status: SHIPPED | OUTPATIENT
Start: 2023-07-11 | End: 2024-06-18

## 2023-07-13 ENCOUNTER — OFFICE VISIT (OUTPATIENT)
Dept: ORTHOPEDICS | Facility: CLINIC | Age: 58
End: 2023-07-13
Payer: COMMERCIAL

## 2023-07-13 DIAGNOSIS — M51.16 LUMBAR DISC HERNIATION WITH RADICULOPATHY: Primary | ICD-10-CM

## 2023-07-13 DIAGNOSIS — M51.369 DDD (DEGENERATIVE DISC DISEASE), LUMBAR: ICD-10-CM

## 2023-07-13 PROCEDURE — 99214 OFFICE O/P EST MOD 30 MIN: CPT | Performed by: PREVENTIVE MEDICINE

## 2023-07-13 RX ORDER — METHOCARBAMOL 500 MG/1
500 TABLET, FILM COATED ORAL 2 TIMES DAILY PRN
Qty: 60 TABLET | Refills: 0 | Status: SHIPPED | OUTPATIENT
Start: 2023-07-13 | End: 2024-02-12

## 2023-07-13 NOTE — LETTER
7/13/2023      RE: Camacho Bhagat  [address] [facility] [address]     Dear Colleague,    Thank you for referring your patient, Camacho Bhagat, to the [facility] [place]. Please see a copy of my visit note below.    HISTORY OF PRESENT ILLNESS  Mr. Bhagat is a pleasant 57 year old year old male who presents to clinic today with the following:  What problem are you here for? Bilateral low back pain overall improved since lumbar DANNY  This has resolved his sciatica symptoms  His low back continues to bother him daily  He just started a new job on his feet and this has caused more soreness  With more time spent on his feet and walking  How long have you had this problem? Chronic     Have you had any recent imaging of this problem? Xrays/MRI/CT scans? Yes    Have you had treatments for this problem in the past?  -Medications? Oxycodone PRN   -Physical therapy? No   -Injections? Lumbar DANNY on 6/7/23  -Surgery? No     How severe is this problem today? 0-10 scale? 7-9/10     How severe has this problem been at WORST in the past? 0-10 scale? 10    What do you think caused this problem? Chronic     Does this problem or its symptoms cause difficulty for you falling asleep or staying asleep? No     Anything else you want us to know about this problem? Patient had a DANNY injection on 6/7/23, this relieved his numbness and tingling but he continues to have low back pain.           MEDICAL HISTORY  Patient Active Problem List   Diagnosis    Carpal tunnel syndrome    Testicular hypofunction    Sicca syndrome (H)    Hepatocellular carcinoma (H)    Osteoarthritis    Rheumatoid arthritis (H)    Hyperkalemia    Liver transplant recipient (H)    Immunosuppressed status (H)    Neutropenic colitis (H)    S/P liver transplant (H)    Pancytopenia (H)    Ischemia of both lower extremities    Elevated serum creatinine    Abscess, intra-abdominal, postoperative    Liver transplant rejection (H)    CMV  colitis (H)    Type 2 diabetes mellitus with diabetic polyneuropathy, with long-term current use of insulin (H)    Diarrhea of infectious origin (Plesiomonas shigelloides (formerly known Aeromonas shigelloides) in 3/14/2019 sample)    Hypertension secondary to other renal disorders    Chronic kidney disease, stage 3 (H)    Morbid obesity (H)    F11.9 - Chronic, continuous use of opioids    Fibromyalgia       Current Outpatient Medications   Medication Sig Dispense Refill    alcohol swab prep pads Use to swab area of injection/francisca as directed. 100 each 3    alpha-lipoic acid 600 MG capsule Take 600 mg by mouth      amLODIPine (NORVASC) 10 MG tablet Take 1 tablet (10 mg) by mouth daily 90 tablet 3    augmented betamethasone dipropionate (DIPROLENE-AF) 0.05 % external ointment Apply twice daily as needed for rash on the legs 45 g 11    blood glucose (NO BRAND SPECIFIED) lancets standard Use to test blood sugar 1 times daily or as directed. 100 lancet 3    blood glucose (NO BRAND SPECIFIED) test strip Use to test blood sugar 1 times daily or as directed. 50 strip 11    blood glucose calibration (NO BRAND SPECIFIED) solution Use to calibrate meter. 1 Bottle 3    Blood Glucose Monitoring Suppl (CONTOUR BLOOD GLUCOSE SYSTEM) w/Device KIT For use with the Omnipod Dash insulin pump 1 kit 1    cholecalciferol (VITAMIN D3) 1000 UNIT tablet Take 1 tablet (1,000 Units) by mouth daily (Patient taking differently: Take 1,000 Units by mouth every morning) 100 tablet 3    Continuous Blood Gluc Sensor (DEXCOM G6 SENSOR) MISC Change every 10 days. 9 each 3    Continuous Blood Gluc Transmit (DEXCOM G6 TRANSMITTER) MISC Change every 3 months. 1 each 3    cyclobenzaprine (FLEXERIL) 10 MG tablet Take 1 tablet (10 mg) by mouth 3 times daily as needed for muscle spasms 90 tablet 3    doxazosin (CARDURA) 2 MG tablet Take 3 tablets (6 mg) by mouth At Bedtime 270 tablet 3    DULoxetine (CYMBALTA) 30 MG capsule Take 1 capsule (30 mg) by mouth  daily 30 capsule 1    fluticasone (FLONASE) 50 MCG/ACT nasal spray Spray 1 spray into both nostrils daily 20 mL 3    furosemide (LASIX) 40 MG tablet Take 60 mg AM and 40 mg afternoon 180 tablet 3    gabapentin (NEURONTIN) 800 MG tablet Take 1 tablet (800 mg) by mouth 3 times daily 90 tablet 11    hydrOXYzine (ATARAX) 25 MG tablet Take 1 tablet (25 mg) by mouth 3 times daily as needed for itching 90 tablet 3    Insulin Disposable Pump (OMNIPOD 5 G6 INTRO, GEN 5,) KIT 1 kit daily 1 kit 0    Insulin Disposable Pump (OMNIPOD 5 G6 POD, GEN 5,) MISC 1 each See Admin Instructions Change every 2 days. Use for insulin administration. 45 each 3    Insulin Disposable Pump (OMNIPOD 5 G6 POD, GEN 5,) MISC 1 each See Admin Instructions Use per 's instructions. 1 each 1    Insulin Lispro (HUMALOG KWIKPEN) 200 UNIT/ML soln To be used in the insulin pump. Total daily dose up to 170 U. 72 mL 3    insulin pen needle (BD ENE U/F) 32G X 4 MM miscellaneous Use 1 pen needle 4 times daily or as directed. 400 each 1    lidocaine (LIDODERM) 5 % patch Place 1 patch onto the skin every 24 hours To prevent lidocaine toxicity, patient should be patch free for 12 hrs daily. 30 patch 11    losartan (COZAAR) 25 MG tablet Take 1 tablet (25 mg) by mouth daily 90 tablet 3    metoprolol succinate ER (TOPROL XL) 200 MG 24 hr tablet Take 1 tablet (200 mg) by mouth daily 90 tablet 3    mycophenolic acid (GENERIC EQUIVALENT) 360 MG EC tablet Take 2 tablets (720 mg) by mouth every 12 hours 360 tablet 3    oxyCODONE (ROXICODONE) 5 MG tablet Take 1 tablet (5 mg) by mouth 4 times daily as needed for pain maximum 6 tablet(s) per day 30 tablet 0    patiromer (VELTASSA) 8.4 g packet Take 8.4 g by mouth daily 90 each 3    predniSONE (DELTASONE) 2.5 MG tablet Take 1 tablet (2.5 mg) by mouth daily 90 tablet 3    sildenafil (REVATIO) 20 MG tablet Take 2 tablets (40 mg) by mouth daily as needed (erectile dysfunction) 10 tablet 11    tacrolimus (GENERIC  EQUIVALENT) 1 MG capsule Take 4 capsules (4 mg) by mouth every morning AND 3 capsules (3 mg) every evening. 630 capsule 3    tretinoin (RETIN-A) 0.025 % external cream Apply a pea-sized amount evenly over the face at nighttime before bed 45 g 11    triamcinolone (KENALOG) 0.1 % external ointment Apply topically 2 times daily to the itching on the legs 80 g 1    ursodiol (ACTIGALL) 500 MG tablet Take 1 tablet (500 mg) by mouth 2 times daily 180 tablet 3       Allergies   Allergen Reactions    Erythromycin GI Disturbance    Losartan Other (See Comments)     Consistently develops hyperkalemia    Vioxx      Nausea, vomiting       Family History   Problem Relation Age of Onset    Diabetes Father     Hypertension Father     Substance Abuse Father     Arthritis Mother     Thyroid Cancer Mother         Survivor!    Cervical Cancer Maternal Grandmother     Cerebrovascular Disease Maternal Grandfather     No Known Problems Paternal Grandmother     Prostate Cancer Paternal Grandfather     Colon Cancer No family hx of     Hyperlipidemia No family hx of     Coronary Artery Disease No family hx of     Breast Cancer No family hx of      Social History     Socioeconomic History    Marital status:     Number of children: 1   Occupational History    Occupation: [employer]    Tobacco Use    Smoking status: Former     Packs/day: 0.25     Years: 2.00     Pack years: 0.50     Types: Cigarettes     Quit date:      Years since quittin.5    Smokeless tobacco: Former     Types: Chew     Quit date: 10/8/2015    Tobacco comments:     1 Cigar once every other month.   Substance and Sexual Activity    Alcohol use: No     Alcohol/week: 0.0 standard drinks of alcohol     Comment: No alcohol since liver transplant.      Drug use: No    Sexual activity: Not Currently     Partners: Female   Social History Narrative    Former [employer], then worked as [employer].           Additional medical/Social/Surgical histories  reviewed in EPIC and updated as appropriate.     REVIEW OF SYSTEMS (7/13/2023)  10 point ROS of systems including Constitutional, Eyes, Respiratory, Cardiovascular, Gastroenterology, Genitourinary, Integumentary, Musculoskeletal, Psychiatric, Allergic/Immunologic were all negative except for pertinent positives noted in my HPI.     PHYSICAL EXAM  VSS    General  - normal appearance, in no obvious distress  HEENT  - conjunctivae not injected, moist mucous membranes, normocephalic/atraumatic head, ears normal appearance, no lesions, mouth normal appearance, no scars, normal dentition and teeth present  CV  - normal peripheral perfusion  Pulm  - normal respiratory pattern, non-labored  Musculoskeletal - lumbar spine  - stance: normal gait without limp, no obvious leg length discrepancy, normal heel and toe walk  - inspection: normal bone and joint alignment, no obvious scoliosis  - palpation: no paravertebral or bony tenderness  - ROM: flexion exacerbates pain, normal extension, sidebending, rotation  - strength: lower extremities 5/5 in all planes  - special tests:  (-) straight leg raise  (+) slump test some pain  Neuro  - patellar and Achilles DTRs 2+ bilaterally, no lower extremity sensory deficit throughout L5 distribution, grossly normal coordination, normal muscle tone  Skin  - no ecchymosis, erythema, warmth, or induration, no obvious rash  Psych  - interactive, appropriate, normal mood and affect    ASSESSMENT & PLAN  58 yo male with lumbar ddd, disc herniations, radicular pain, improved, not resolved    I independently reviewed the following imaging studies:  Lumbar MRI: shows ddd, disc herniations  Discussed can consider repeat DANNY in 2 months  Ordered PT for him  rx given for methocarbamol will check with his liver transplant team as option for PRN use  Cont; gabapentin  Patient has been doing home exercise physical therapy program for this problem      Appropriate PPE was utilized for prevention of spread  of Covid-19.  Ronaldo Doty MD, CAQSM          Again, thank you for allowing me to participate in the care of your patient.      Sincerely,    Ronaldo Doty MD

## 2023-07-13 NOTE — PROGRESS NOTES
HISTORY OF PRESENT ILLNESS  Mr. Bhagat is a pleasant 57 year old year old male who presents to clinic today with the following:  What problem are you here for? Bilateral low back pain overall improved since lumbar DANNY  This has resolved his sciatica symptoms  His low back continues to bother him daily  He just started a new job on his feet and this has caused more soreness  With more time spent on his feet and walking  How long have you had this problem? Chronic     Have you had any recent imaging of this problem? Xrays/MRI/CT scans? Yes    Have you had treatments for this problem in the past?  -Medications? Oxycodone PRN   -Physical therapy? No   -Injections? Lumbar DANNY on 6/7/23  -Surgery? No     How severe is this problem today? 0-10 scale? 7-9/10     How severe has this problem been at WORST in the past? 0-10 scale? 10    What do you think caused this problem? Chronic     Does this problem or its symptoms cause difficulty for you falling asleep or staying asleep? No     Anything else you want us to know about this problem? Patient had a DANNY injection on 6/7/23, this relieved his numbness and tingling but he continues to have low back pain.           MEDICAL HISTORY  Patient Active Problem List   Diagnosis     Carpal tunnel syndrome     Testicular hypofunction     Sicca syndrome (H)     Hepatocellular carcinoma (H)     Osteoarthritis     Rheumatoid arthritis (H)     Hyperkalemia     Liver transplant recipient (H)     Immunosuppressed status (H)     Neutropenic colitis (H)     S/P liver transplant (H)     Pancytopenia (H)     Ischemia of both lower extremities     Elevated serum creatinine     Abscess, intra-abdominal, postoperative     Liver transplant rejection (H)     CMV colitis (H)     Type 2 diabetes mellitus with diabetic polyneuropathy, with long-term current use of insulin (H)     Diarrhea of infectious origin (Plesiomonas shigelloides (formerly known Aeromonas shigelloides) in 3/14/2019 sample)      Hypertension secondary to other renal disorders     Chronic kidney disease, stage 3 (H)     Morbid obesity (H)     F11.9 - Chronic, continuous use of opioids     Fibromyalgia       Current Outpatient Medications   Medication Sig Dispense Refill     alcohol swab prep pads Use to swab area of injection/francisca as directed. 100 each 3     alpha-lipoic acid 600 MG capsule Take 600 mg by mouth       amLODIPine (NORVASC) 10 MG tablet Take 1 tablet (10 mg) by mouth daily 90 tablet 3     augmented betamethasone dipropionate (DIPROLENE-AF) 0.05 % external ointment Apply twice daily as needed for rash on the legs 45 g 11     blood glucose (NO BRAND SPECIFIED) lancets standard Use to test blood sugar 1 times daily or as directed. 100 lancet 3     blood glucose (NO BRAND SPECIFIED) test strip Use to test blood sugar 1 times daily or as directed. 50 strip 11     blood glucose calibration (NO BRAND SPECIFIED) solution Use to calibrate meter. 1 Bottle 3     Blood Glucose Monitoring Suppl (CONTOUR BLOOD GLUCOSE SYSTEM) w/Device KIT For use with the Omnipod Dash insulin pump 1 kit 1     cholecalciferol (VITAMIN D3) 1000 UNIT tablet Take 1 tablet (1,000 Units) by mouth daily (Patient taking differently: Take 1,000 Units by mouth every morning) 100 tablet 3     Continuous Blood Gluc Sensor (DEXCOM G6 SENSOR) MISC Change every 10 days. 9 each 3     Continuous Blood Gluc Transmit (DEXCOM G6 TRANSMITTER) MISC Change every 3 months. 1 each 3     cyclobenzaprine (FLEXERIL) 10 MG tablet Take 1 tablet (10 mg) by mouth 3 times daily as needed for muscle spasms 90 tablet 3     doxazosin (CARDURA) 2 MG tablet Take 3 tablets (6 mg) by mouth At Bedtime 270 tablet 3     DULoxetine (CYMBALTA) 30 MG capsule Take 1 capsule (30 mg) by mouth daily 30 capsule 1     fluticasone (FLONASE) 50 MCG/ACT nasal spray Spray 1 spray into both nostrils daily 20 mL 3     furosemide (LASIX) 40 MG tablet Take 60 mg AM and 40 mg afternoon 180 tablet 3     gabapentin  (NEURONTIN) 800 MG tablet Take 1 tablet (800 mg) by mouth 3 times daily 90 tablet 11     hydrOXYzine (ATARAX) 25 MG tablet Take 1 tablet (25 mg) by mouth 3 times daily as needed for itching 90 tablet 3     Insulin Disposable Pump (OMNIPOD 5 G6 INTRO, GEN 5,) KIT 1 kit daily 1 kit 0     Insulin Disposable Pump (OMNIPOD 5 G6 POD, GEN 5,) MISC 1 each See Admin Instructions Change every 2 days. Use for insulin administration. 45 each 3     Insulin Disposable Pump (OMNIPOD 5 G6 POD, GEN 5,) MISC 1 each See Admin Instructions Use per 's instructions. 1 each 1     Insulin Lispro (HUMALOG KWIKPEN) 200 UNIT/ML soln To be used in the insulin pump. Total daily dose up to 170 U. 72 mL 3     insulin pen needle (BD ENE U/F) 32G X 4 MM miscellaneous Use 1 pen needle 4 times daily or as directed. 400 each 1     lidocaine (LIDODERM) 5 % patch Place 1 patch onto the skin every 24 hours To prevent lidocaine toxicity, patient should be patch free for 12 hrs daily. 30 patch 11     losartan (COZAAR) 25 MG tablet Take 1 tablet (25 mg) by mouth daily 90 tablet 3     metoprolol succinate ER (TOPROL XL) 200 MG 24 hr tablet Take 1 tablet (200 mg) by mouth daily 90 tablet 3     mycophenolic acid (GENERIC EQUIVALENT) 360 MG EC tablet Take 2 tablets (720 mg) by mouth every 12 hours 360 tablet 3     oxyCODONE (ROXICODONE) 5 MG tablet Take 1 tablet (5 mg) by mouth 4 times daily as needed for pain maximum 6 tablet(s) per day 30 tablet 0     patiromer (VELTASSA) 8.4 g packet Take 8.4 g by mouth daily 90 each 3     predniSONE (DELTASONE) 2.5 MG tablet Take 1 tablet (2.5 mg) by mouth daily 90 tablet 3     sildenafil (REVATIO) 20 MG tablet Take 2 tablets (40 mg) by mouth daily as needed (erectile dysfunction) 10 tablet 11     tacrolimus (GENERIC EQUIVALENT) 1 MG capsule Take 4 capsules (4 mg) by mouth every morning AND 3 capsules (3 mg) every evening. 630 capsule 3     tretinoin (RETIN-A) 0.025 % external cream Apply a pea-sized amount  evenly over the face at nighttime before bed 45 g 11     triamcinolone (KENALOG) 0.1 % external ointment Apply topically 2 times daily to the itching on the legs 80 g 1     ursodiol (ACTIGALL) 500 MG tablet Take 1 tablet (500 mg) by mouth 2 times daily 180 tablet 3       Allergies   Allergen Reactions     Erythromycin GI Disturbance     Losartan Other (See Comments)     Consistently develops hyperkalemia     Vioxx      Nausea, vomiting       Family History   Problem Relation Age of Onset     Diabetes Father      Hypertension Father      Substance Abuse Father      Arthritis Mother      Thyroid Cancer Mother         Survivor!     Cervical Cancer Maternal Grandmother      Cerebrovascular Disease Maternal Grandfather      No Known Problems Paternal Grandmother      Prostate Cancer Paternal Grandfather      Colon Cancer No family hx of      Hyperlipidemia No family hx of      Coronary Artery Disease No family hx of      Breast Cancer No family hx of      Social History     Socioeconomic History     Marital status:      Number of children: 1   Occupational History     Occupation: [employer]    Tobacco Use     Smoking status: Former     Packs/day: 0.25     Years: 2.00     Pack years: 0.50     Types: Cigarettes     Quit date:      Years since quittin.5     Smokeless tobacco: Former     Types: Chew     Quit date: 10/8/2015     Tobacco comments:     1 Cigar once every other month.   Substance and Sexual Activity     Alcohol use: No     Alcohol/week: 0.0 standard drinks of alcohol     Comment: No alcohol since liver transplant.       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           Additional medical/Social/Surgical histories reviewed in [facility] and updated as appropriate.     REVIEW OF SYSTEMS (2023)  10 point ROS of systems including Constitutional, Eyes, Respiratory, Cardiovascular,  Gastroenterology, Genitourinary, Integumentary, Musculoskeletal, Psychiatric, Allergic/Immunologic were all negative except for pertinent positives noted in my HPI.     PHYSICAL EXAM  VSS    General  - normal appearance, in no obvious distress  HEENT  - conjunctivae not injected, moist mucous membranes, normocephalic/atraumatic head, ears normal appearance, no lesions, mouth normal appearance, no scars, normal dentition and teeth present  CV  - normal peripheral perfusion  Pulm  - normal respiratory pattern, non-labored  Musculoskeletal - lumbar spine  - stance: normal gait without limp, no obvious leg length discrepancy, normal heel and toe walk  - inspection: normal bone and joint alignment, no obvious scoliosis  - palpation: no paravertebral or bony tenderness  - ROM: flexion exacerbates pain, normal extension, sidebending, rotation  - strength: lower extremities 5/5 in all planes  - special tests:  (-) straight leg raise  (+) slump test some pain  Neuro  - patellar and Achilles DTRs 2+ bilaterally, no lower extremity sensory deficit throughout L5 distribution, grossly normal coordination, normal muscle tone  Skin  - no ecchymosis, erythema, warmth, or induration, no obvious rash  Psych  - interactive, appropriate, normal mood and affect    ASSESSMENT & PLAN  56 yo male with lumbar ddd, disc herniations, radicular pain, improved, not resolved    I independently reviewed the following imaging studies:  Lumbar MRI: shows ddd, disc herniations  Discussed can consider repeat DANNY in 2 months  Ordered PT for him  rx given for methocarbamol will check with his liver transplant team as option for PRN use  Cont; gabapentin  Patient has been doing home exercise physical therapy program for this problem      Appropriate PPE was utilized for prevention of spread of Covid-19.  Ronaldo Doty MD, CAM

## 2023-07-13 NOTE — PROGRESS NOTES
Dianne Flaherty called to check the status of the forms that were sent over via fax ([phone]) on 7/10. These forms were for ordering Freestyle Mitzy and insulin needles.   Please advise ET tube advanced by 3 cm per MD order. 24 cm to 27 cm at the lip.

## 2023-07-31 ENCOUNTER — LAB (OUTPATIENT)
Dept: LAB | Facility: CLINIC | Age: 58
End: 2023-07-31
Payer: COMMERCIAL

## 2023-07-31 DIAGNOSIS — N18.31 STAGE 3A CHRONIC KIDNEY DISEASE (H): ICD-10-CM

## 2023-07-31 DIAGNOSIS — Z94.4 LIVER REPLACED BY TRANSPLANT (H): ICD-10-CM

## 2023-07-31 DIAGNOSIS — E11.42 TYPE 2 DIABETES MELLITUS WITH DIABETIC POLYNEUROPATHY, WITH LONG-TERM CURRENT USE OF INSULIN (H): ICD-10-CM

## 2023-07-31 DIAGNOSIS — Z79.4 TYPE 2 DIABETES MELLITUS WITH DIABETIC POLYNEUROPATHY, WITH LONG-TERM CURRENT USE OF INSULIN (H): ICD-10-CM

## 2023-07-31 LAB
ALBUMIN SERPL BCG-MCNC: 4.3 G/DL (ref 3.5–5.2)
ALP SERPL-CCNC: 173 U/L (ref 40–129)
ALT SERPL W P-5'-P-CCNC: 27 U/L (ref 0–70)
ANION GAP SERPL CALCULATED.3IONS-SCNC: 12 MMOL/L (ref 7–15)
AST SERPL W P-5'-P-CCNC: 32 U/L (ref 0–45)
BILIRUB DIRECT SERPL-MCNC: <0.2 MG/DL (ref 0–0.3)
BILIRUB SERPL-MCNC: 0.5 MG/DL
BUN SERPL-MCNC: 35.1 MG/DL (ref 6–20)
CALCIUM SERPL-MCNC: 9.3 MG/DL (ref 8.6–10)
CHLORIDE SERPL-SCNC: 107 MMOL/L (ref 98–107)
CREAT SERPL-MCNC: 1.58 MG/DL (ref 0.67–1.17)
DEPRECATED HCO3 PLAS-SCNC: 21 MMOL/L (ref 22–29)
ERYTHROCYTE [DISTWIDTH] IN BLOOD BY AUTOMATED COUNT: 13.6 % (ref 10–15)
GFR SERPL CREATININE-BSD FRML MDRD: 50 ML/MIN/1.73M2
GLUCOSE SERPL-MCNC: 77 MG/DL (ref 70–99)
HBA1C MFR BLD: 5.7 %
HCT VFR BLD AUTO: 31.9 % (ref 40–53)
HGB BLD-MCNC: 10.8 G/DL (ref 13.3–17.7)
HOLD SPECIMEN: NORMAL
MCH RBC QN AUTO: 30.5 PG (ref 26.5–33)
MCHC RBC AUTO-ENTMCNC: 33.9 G/DL (ref 31.5–36.5)
MCV RBC AUTO: 90 FL (ref 78–100)
PLATELET # BLD AUTO: 104 10E3/UL (ref 150–450)
POTASSIUM SERPL-SCNC: 4.7 MMOL/L (ref 3.4–5.3)
PROT SERPL-MCNC: 6.7 G/DL (ref 6.4–8.3)
RBC # BLD AUTO: 3.54 10E6/UL (ref 4.4–5.9)
SODIUM SERPL-SCNC: 140 MMOL/L (ref 136–145)
TACROLIMUS BLD-MCNC: 3.1 UG/L (ref 5–15)
TME LAST DOSE: ABNORMAL H
TME LAST DOSE: ABNORMAL H
WBC # BLD AUTO: 6.1 10E3/UL (ref 4–11)

## 2023-07-31 PROCEDURE — 80197 ASSAY OF TACROLIMUS: CPT | Performed by: INTERNAL MEDICINE

## 2023-07-31 PROCEDURE — 82248 BILIRUBIN DIRECT: CPT | Performed by: PATHOLOGY

## 2023-07-31 PROCEDURE — 99000 SPECIMEN HANDLING OFFICE-LAB: CPT | Performed by: PATHOLOGY

## 2023-07-31 PROCEDURE — 85027 COMPLETE CBC AUTOMATED: CPT | Performed by: PATHOLOGY

## 2023-07-31 PROCEDURE — 83036 HEMOGLOBIN GLYCOSYLATED A1C: CPT | Performed by: INTERNAL MEDICINE

## 2023-07-31 PROCEDURE — 84156 ASSAY OF PROTEIN URINE: CPT | Performed by: PATHOLOGY

## 2023-07-31 PROCEDURE — 36415 COLL VENOUS BLD VENIPUNCTURE: CPT | Performed by: PATHOLOGY

## 2023-07-31 PROCEDURE — 80053 COMPREHEN METABOLIC PANEL: CPT | Performed by: PATHOLOGY

## 2023-07-31 PROCEDURE — 84100 ASSAY OF PHOSPHORUS: CPT | Performed by: PATHOLOGY

## 2023-08-01 ENCOUNTER — OFFICE VISIT (OUTPATIENT)
Dept: GASTROENTEROLOGY | Facility: CLINIC | Age: 58
End: 2023-08-01
Attending: INTERNAL MEDICINE
Payer: COMMERCIAL

## 2023-08-01 VITALS
OXYGEN SATURATION: 99 % | TEMPERATURE: 98.6 F | DIASTOLIC BLOOD PRESSURE: 87 MMHG | SYSTOLIC BLOOD PRESSURE: 175 MMHG | RESPIRATION RATE: 20 BRPM | HEART RATE: 64 BPM

## 2023-08-01 DIAGNOSIS — N18.31 STAGE 3A CHRONIC KIDNEY DISEASE (H): Primary | ICD-10-CM

## 2023-08-01 DIAGNOSIS — Z94.4 LIVER REPLACED BY TRANSPLANT (H): Primary | ICD-10-CM

## 2023-08-01 LAB
ALBUMIN MFR UR ELPH: 88.5 MG/DL
ALBUMIN SERPL BCG-MCNC: 4.4 G/DL (ref 3.5–5.2)
ANION GAP SERPL CALCULATED.3IONS-SCNC: 12 MMOL/L (ref 7–15)
BUN SERPL-MCNC: 35.5 MG/DL (ref 6–20)
CALCIUM SERPL-MCNC: 9.3 MG/DL (ref 8.6–10)
CHLORIDE SERPL-SCNC: 107 MMOL/L (ref 98–107)
CREAT SERPL-MCNC: 1.57 MG/DL (ref 0.67–1.17)
CREAT UR-MCNC: 31.9 MG/DL
DEPRECATED HCO3 PLAS-SCNC: 20 MMOL/L (ref 22–29)
GFR SERPL CREATININE-BSD FRML MDRD: 51 ML/MIN/1.73M2
GLUCOSE SERPL-MCNC: 75 MG/DL (ref 70–99)
PHOSPHATE SERPL-MCNC: 3.6 MG/DL (ref 2.5–4.5)
POTASSIUM SERPL-SCNC: 4.7 MMOL/L (ref 3.4–5.3)
PROT/CREAT 24H UR: 2.77 MG/MG CR (ref 0–0.2)
SODIUM SERPL-SCNC: 139 MMOL/L (ref 136–145)

## 2023-08-01 PROCEDURE — 99214 OFFICE O/P EST MOD 30 MIN: CPT | Performed by: INTERNAL MEDICINE

## 2023-08-01 PROCEDURE — 99213 OFFICE O/P EST LOW 20 MIN: CPT | Performed by: INTERNAL MEDICINE

## 2023-08-01 ASSESSMENT — PAIN SCALES - GENERAL: PAINLEVEL: SEVERE PAIN (7)

## 2023-08-01 NOTE — NURSING NOTE
Chief Complaint   Patient presents with    RECHECK     S/P Liver TX 3/4/2017     Vital signs:  Temp: 98.6  F (37  C) Temp src: Oral BP: (!) 175/87 Pulse: 64   Resp: 20 SpO2: 99 %       Weight:  (Declined, states 280 lbs)  Estimated body mass index is 38.65 kg/m  as calculated from the following:    Height as of 6/23/23: 1.829 m (6').    Weight as of 6/23/23: 129.3 kg (285 lb).      Katie Harmon, [facility]  8/1/2023 8:30 AM

## 2023-08-01 NOTE — LETTER
8/1/2023         RE: Camacho Bhagat  [address]  [facility] [address]        Dear Colleague,    Thank you for referring your patient, Camacho Bhagat, to the [facility] [place]. Please see a copy of my visit note below.    HISTORY OF PRESENT ILLNESS:  I had the pleasure of seeing Camacho Bhagat for followup in the Liver Transplant Clinic at the [facility] on 08/01/2023.  Mr. Bhagat returns now 6-1/2 years status post liver transplantation.    He is doing well for the most part.  He denies abdominal pain, itching, skin rash, or fatigue.  He continues to work full time.  He denies increased abdominal girth or lower extremity edema.      He denies fevers or chills, cough or shortness of breath.  He denies any nausea, vomiting.  He does have chronic diarrhea.  He does report almost what sounds like a dumping syndrome.  He is to see Dr. Trent Villanueva and is getting an upper GI endoscopy to evaluate that process.  He did have a partial colectomy around the time of transplant with resection of the ileocecal valve and I suspect this is some of the reason why he has these symptoms.      Otherwise, his appetite is good and his weight for the most part has remained stable.     Current Outpatient Medications   Medication    alcohol swab prep pads    alpha-lipoic acid 600 MG capsule    amLODIPine (NORVASC) 10 MG tablet    augmented betamethasone dipropionate (DIPROLENE-AF) 0.05 % external ointment    blood glucose (NO BRAND SPECIFIED) lancets standard    blood glucose (NO BRAND SPECIFIED) test strip    blood glucose calibration (NO BRAND SPECIFIED) solution    Blood Glucose Monitoring Suppl (CONTOUR BLOOD GLUCOSE SYSTEM) w/Device KIT    cholecalciferol (VITAMIN D3) 1000 UNIT tablet    Continuous Blood Gluc Sensor (DEXCOM G6 SENSOR) MISC    Continuous Blood Gluc Transmit (DEXCOM G6 TRANSMITTER) MISC    cyclobenzaprine (FLEXERIL) 10 MG tablet    doxazosin (CARDURA) 2 MG  tablet    DULoxetine (CYMBALTA) 30 MG capsule    fluticasone (FLONASE) 50 MCG/ACT nasal spray    furosemide (LASIX) 40 MG tablet    gabapentin (NEURONTIN) 800 MG tablet    hydrOXYzine (ATARAX) 25 MG tablet    Insulin Disposable Pump (OMNIPOD 5 G6 INTRO, GEN 5,) KIT    Insulin Disposable Pump (OMNIPOD 5 G6 POD, GEN 5,) MISC    Insulin Disposable Pump (OMNIPOD 5 G6 POD, GEN 5,) MISC    Insulin Lispro (HUMALOG KWIKPEN) 200 UNIT/ML soln    insulin pen needle (BD ENE U/F) 32G X 4 MM miscellaneous    lidocaine (LIDODERM) 5 % patch    losartan (COZAAR) 25 MG tablet    methocarbamol (ROBAXIN) 500 MG tablet    metoprolol succinate ER (TOPROL XL) 200 MG 24 hr tablet    mycophenolic acid (GENERIC EQUIVALENT) 360 MG EC tablet    oxyCODONE (ROXICODONE) 5 MG tablet    patiromer (VELTASSA) 8.4 g packet    predniSONE (DELTASONE) 2.5 MG tablet    sildenafil (REVATIO) 20 MG tablet    tacrolimus (GENERIC EQUIVALENT) 1 MG capsule    tretinoin (RETIN-A) 0.025 % external cream    triamcinolone (KENALOG) 0.1 % external ointment    ursodiol (ACTIGALL) 500 MG tablet     No current facility-administered medications for this visit.     BP (!) 175/87 (BP Location: Right arm, Patient Position: Sitting, Cuff Size: Adult Large)   Pulse 64   Temp 98.6  F (37  C) (Oral)   Resp 20   SpO2 99%     PHYSICAL EXAMINATION:    GENERAL:  He looks quite well.  HEENT:  No scleral icterus or temporal muscle wasting.  CHEST:  Clear.  ABDOMEN:  No increase in girth.  No masses or tenderness to palpation are present.  His liver is 10 cm in span without left lobe enlargement.  No spleen tip is palpable.  EXTREMITIES:  No edema.  SKIN:  No stigmata of chronic liver disease.  NEUROLOGIC:  Nonfocal.    Recent Results (from the past 168 hour(s))   Basic metabolic panel    Collection Time: 07/31/23  5:04 PM   Result Value Ref Range    Sodium 140 136 - 145 mmol/L    Potassium 4.7 3.4 - 5.3 mmol/L    Chloride 107 98 - 107 mmol/L    Carbon Dioxide (CO2) 21 (L) 22 -  29 mmol/L    Anion Gap 12 7 - 15 mmol/L    Urea Nitrogen 35.1 (H) 6.0 - 20.0 mg/dL    Creatinine 1.58 (H) 0.67 - 1.17 mg/dL    Calcium 9.3 8.6 - 10.0 mg/dL    Glucose 77 70 - 99 mg/dL    GFR Estimate 50 (L) >60 mL/min/1.73m2   CBC with platelets    Collection Time: 07/31/23  5:04 PM   Result Value Ref Range    WBC Count 6.1 4.0 - 11.0 10e3/uL    RBC Count 3.54 (L) 4.40 - 5.90 10e6/uL    Hemoglobin 10.8 (L) 13.3 - 17.7 g/dL    Hematocrit 31.9 (L) 40.0 - 53.0 %    MCV 90 78 - 100 fL    MCH 30.5 26.5 - 33.0 pg    MCHC 33.9 31.5 - 36.5 g/dL    RDW 13.6 10.0 - 15.0 %    Platelet Count 104 (L) 150 - 450 10e3/uL   Hepatic panel    Collection Time: 07/31/23  5:04 PM   Result Value Ref Range    Protein Total 6.7 6.4 - 8.3 g/dL    Albumin 4.3 3.5 - 5.2 g/dL    Bilirubin Total 0.5 <=1.2 mg/dL    Alkaline Phosphatase 173 (H) 40 - 129 U/L    AST 32 0 - 45 U/L    ALT 27 0 - 70 U/L    Bilirubin Direct <0.20 0.00 - 0.30 mg/dL   Tacrolimus by Tandem Mass Spectrometry    Collection Time: 07/31/23  5:04 PM   Result Value Ref Range    Tacrolimus by Tandem Mass Spectrometry 3.1 (L) 5.0 - 15.0 ug/L    Tacrolimus Last Dose Date 7/30/2023     Tacrolimus Last Dose Time  4:35 AM    Hemoglobin A1c    Collection Time: 07/31/23  5:04 PM   Result Value Ref Range    Hemoglobin A1C 5.7 (H) <5.7 %   Extra Urine Collection    Collection Time: 07/31/23  5:10 PM   Result Value Ref Range    Hold Specimen JI       IMPRESSION:  My impression is Mr. Bhagat is doing well now 6-1/2 years status post liver transplantation.  His liver tests are quite good.  He does have an elevation in alkaline phosphatase but it has been stable.  He does have chronic kidney disease as well, and similarly, that remains stable with a GFR of 50.    He also has diabetes and his A1c is 5.7, which is excellent.  He is up to date with regard to vaccines.  For other cancer screening, he should see the dermatologist.  He is up to date with regard to screening colonoscopy as well.  He  does need to have a bone density study done as it has been > 2 years.       My plan will be to see him back in the clinic again in 6 months.    I did spend a total of 30 minutes (on the date of the encounter), including 20 minutes of face-to-face clinic time including counseling. The rest of the time was spent in documentation and review of records.     Thank you very much for allowing me to participate in the care of this patient.  If you have any questions regarding recommendations, please do not hesitate to contact me.         Toñito Camejo MD      Professor of Medicine  [employer]      Executive Medical Director, Solid Organ Transplant Program  [facility]

## 2023-08-01 NOTE — PROGRESS NOTES
HISTORY OF PRESENT ILLNESS:  I had the pleasure of seeing Camacho Bhagat for followup in the Liver Transplant Clinic at the [facility] on 08/01/2023.  Mr. Bhagat returns now 6-1/2 years status post liver transplantation.    He is doing well for the most part.  He denies abdominal pain, itching, skin rash, or fatigue.  He continues to work full time.  He denies increased abdominal girth or lower extremity edema.      He denies fevers or chills, cough or shortness of breath.  He denies any nausea, vomiting.  He does have chronic diarrhea.  He does report almost what sounds like a dumping syndrome.  He is to see Dr. Trent Villanueva and is getting an upper GI endoscopy to evaluate that process.  He did have a partial colectomy around the time of transplant with resection of the ileocecal valve and I suspect this is some of the reason why he has these symptoms.      Otherwise, his appetite is good and his weight for the most part has remained stable.     Current Outpatient Medications   Medication     alcohol swab prep pads     alpha-lipoic acid 600 MG capsule     amLODIPine (NORVASC) 10 MG tablet     augmented betamethasone dipropionate (DIPROLENE-AF) 0.05 % external ointment     blood glucose (NO BRAND SPECIFIED) lancets standard     blood glucose (NO BRAND SPECIFIED) test strip     blood glucose calibration (NO BRAND SPECIFIED) solution     Blood Glucose Monitoring Suppl (CONTOUR BLOOD GLUCOSE SYSTEM) w/Device KIT     cholecalciferol (VITAMIN D3) 1000 UNIT tablet     Continuous Blood Gluc Sensor (DEXCOM G6 SENSOR) MISC     Continuous Blood Gluc Transmit (DEXCOM G6 TRANSMITTER) MISC     cyclobenzaprine (FLEXERIL) 10 MG tablet     doxazosin (CARDURA) 2 MG tablet     DULoxetine (CYMBALTA) 30 MG capsule     fluticasone (FLONASE) 50 MCG/ACT nasal spray     furosemide (LASIX) 40 MG tablet     gabapentin (NEURONTIN) 800 MG tablet     hydrOXYzine (ATARAX) 25 MG tablet     Insulin Disposable Pump (OMNIPOD 5  G6 INTRO, GEN 5,) KIT     Insulin Disposable Pump (OMNIPOD 5 G6 POD, GEN 5,) MISC     Insulin Disposable Pump (OMNIPOD 5 G6 POD, GEN 5,) MISC     Insulin Lispro (HUMALOG KWIKPEN) 200 UNIT/ML soln     insulin pen needle (BD ENE U/F) 32G X 4 MM miscellaneous     lidocaine (LIDODERM) 5 % patch     losartan (COZAAR) 25 MG tablet     methocarbamol (ROBAXIN) 500 MG tablet     metoprolol succinate ER (TOPROL XL) 200 MG 24 hr tablet     mycophenolic acid (GENERIC EQUIVALENT) 360 MG EC tablet     oxyCODONE (ROXICODONE) 5 MG tablet     patiromer (VELTASSA) 8.4 g packet     predniSONE (DELTASONE) 2.5 MG tablet     sildenafil (REVATIO) 20 MG tablet     tacrolimus (GENERIC EQUIVALENT) 1 MG capsule     tretinoin (RETIN-A) 0.025 % external cream     triamcinolone (KENALOG) 0.1 % external ointment     ursodiol (ACTIGALL) 500 MG tablet     No current facility-administered medications for this visit.     BP (!) 175/87 (BP Location: Right arm, Patient Position: Sitting, Cuff Size: Adult Large)   Pulse 64   Temp 98.6  F (37  C) (Oral)   Resp 20   SpO2 99%     PHYSICAL EXAMINATION:    GENERAL:  He looks quite well.  HEENT:  No scleral icterus or temporal muscle wasting.  CHEST:  Clear.  ABDOMEN:  No increase in girth.  No masses or tenderness to palpation are present.  His liver is 10 cm in span without left lobe enlargement.  No spleen tip is palpable.  EXTREMITIES:  No edema.  SKIN:  No stigmata of chronic liver disease.  NEUROLOGIC:  Nonfocal.    Recent Results (from the past 168 hour(s))   Basic metabolic panel    Collection Time: 07/31/23  5:04 PM   Result Value Ref Range    Sodium 140 136 - 145 mmol/L    Potassium 4.7 3.4 - 5.3 mmol/L    Chloride 107 98 - 107 mmol/L    Carbon Dioxide (CO2) 21 (L) 22 - 29 mmol/L    Anion Gap 12 7 - 15 mmol/L    Urea Nitrogen 35.1 (H) 6.0 - 20.0 mg/dL    Creatinine 1.58 (H) 0.67 - 1.17 mg/dL    Calcium 9.3 8.6 - 10.0 mg/dL    Glucose 77 70 - 99 mg/dL    GFR Estimate 50 (L) >60 mL/min/1.73m2    CBC with platelets    Collection Time: 07/31/23  5:04 PM   Result Value Ref Range    WBC Count 6.1 4.0 - 11.0 10e3/uL    RBC Count 3.54 (L) 4.40 - 5.90 10e6/uL    Hemoglobin 10.8 (L) 13.3 - 17.7 g/dL    Hematocrit 31.9 (L) 40.0 - 53.0 %    MCV 90 78 - 100 fL    MCH 30.5 26.5 - 33.0 pg    MCHC 33.9 31.5 - 36.5 g/dL    RDW 13.6 10.0 - 15.0 %    Platelet Count 104 (L) 150 - 450 10e3/uL   Hepatic panel    Collection Time: 07/31/23  5:04 PM   Result Value Ref Range    Protein Total 6.7 6.4 - 8.3 g/dL    Albumin 4.3 3.5 - 5.2 g/dL    Bilirubin Total 0.5 <=1.2 mg/dL    Alkaline Phosphatase 173 (H) 40 - 129 U/L    AST 32 0 - 45 U/L    ALT 27 0 - 70 U/L    Bilirubin Direct <0.20 0.00 - 0.30 mg/dL   Tacrolimus by Tandem Mass Spectrometry    Collection Time: 07/31/23  5:04 PM   Result Value Ref Range    Tacrolimus by Tandem Mass Spectrometry 3.1 (L) 5.0 - 15.0 ug/L    Tacrolimus Last Dose Date 7/30/2023     Tacrolimus Last Dose Time  4:35 AM    Hemoglobin A1c    Collection Time: 07/31/23  5:04 PM   Result Value Ref Range    Hemoglobin A1C 5.7 (H) <5.7 %   Extra Urine Collection    Collection Time: 07/31/23  5:10 PM   Result Value Ref Range    Hold Specimen [facility]       IMPRESSION:  My impression is Mr. Bhagat is doing well now 6-1/2 years status post liver transplantation.  His liver tests are quite good.  He does have an elevation in alkaline phosphatase but it has been stable.  He does have chronic kidney disease as well, and similarly, that remains stable with a GFR of 50.    He also has diabetes and his A1c is 5.7, which is excellent.  He is up to date with regard to vaccines.  For other cancer screening, he should see the dermatologist.  He is up to date with regard to screening colonoscopy as well.  He does need to have a bone density study done as it has been > 2 years.       My plan will be to see him back in the clinic again in 6 months.    I did spend a total of 30 minutes (on the date of the encounter), including 20  minutes of face-to-face clinic time including counseling. The rest of the time was spent in documentation and review of records.     Thank you very much for allowing me to participate in the care of this patient.  If you have any questions regarding recommendations, please do not hesitate to contact me.         Toñito Camejo MD      Professor of Medicine  [employer]      Executive Medical Director, Solid Organ Transplant Program  [facility]

## 2023-08-02 ENCOUNTER — TELEPHONE (OUTPATIENT)
Dept: GASTROENTEROLOGY | Facility: CLINIC | Age: 58
End: 2023-08-02
Payer: COMMERCIAL

## 2023-08-02 NOTE — TELEPHONE ENCOUNTER
Attempted to contact patient in order to complete pre assessment questions.     No answer. Left message to return call to [phone] option 4      Procedure details:    Patient scheduled for Upper endoscopy (EGD) on 8.17.23.     Arrival time: 1215. Procedure time 1345    Pre op exam needed? N/A    Facility location: [facility]; [address], [place] [address]    Sedation type: MAC    Indication for procedure: diarrhea      Chart review:     Electronic implanted devices? No    Diabetic? Yes. See medication holding recommendations     Diabetic medication HOLDING recommendations: (if applicable)  Oral diabetic medications: No  Diabetic injectables: No  Insulin: Yes. Consult with managing provider       Medication review:    Anticoagulants? No    NSAIDS? No    Other medication HOLDING recommendations:  N/A        Sonia Cardona RN  Endoscopy Procedure Pre Assessment RN          [Negative] : Heme/Lymph

## 2023-08-02 NOTE — TELEPHONE ENCOUNTER
Pre assessment completed for upcoming procedure.   (Please see previous telephone encounter notes for complete details)    Patient  returned call.       Procedure details:    Arrival time and facility location reviewed    Pre op exam needed? N/A    Designated  policy reviewed. Instructed to have someone stay 24 hours post procedure.     COVID policy reviewed.      Medication review:    Medications reviewed. Please see supporting documentation below. Holding recommendations discussed (if applicable).       Prep for procedure:     Reviewed procedure prep instructions.       Additional information needed?  N/A      Patient  verbalized understanding and had no questions or concerns at this time.      Abril Santana RN  Endoscopy Procedure Pre Assessment RN  [phone] option 4

## 2023-08-04 ENCOUNTER — TELEPHONE (OUTPATIENT)
Dept: ENDOCRINOLOGY | Facility: CLINIC | Age: 58
End: 2023-08-04

## 2023-08-04 NOTE — TELEPHONE ENCOUNTER
Hello!    This message is to notify you that the Diabetes Care Services team has completed their benefit check for this patient's Omnipod 5 G6 pods and Dexcom G6 supplies. We have notified the patient of their approval. If you have any questions, please let us know!    Thank you!    Diabetes Care Services Team  [facility]  [address] [place] [address]  Phone: [phone]  Fax; [phone]

## 2023-08-11 DIAGNOSIS — R80.1 PERSISTENT PROTEINURIA: ICD-10-CM

## 2023-08-11 DIAGNOSIS — I10 ESSENTIAL HYPERTENSION: ICD-10-CM

## 2023-08-11 DIAGNOSIS — I10 BENIGN ESSENTIAL HYPERTENSION: ICD-10-CM

## 2023-08-11 DIAGNOSIS — E87.5 HYPERKALEMIA: ICD-10-CM

## 2023-08-11 DIAGNOSIS — I10 HTN (HYPERTENSION): ICD-10-CM

## 2023-08-11 DIAGNOSIS — N18.31 STAGE 3A CHRONIC KIDNEY DISEASE (H): ICD-10-CM

## 2023-08-13 RX ORDER — AMLODIPINE BESYLATE 10 MG/1
10 TABLET ORAL DAILY
Qty: 90 TABLET | Refills: 3 | Status: SHIPPED | OUTPATIENT
Start: 2023-08-13

## 2023-08-13 RX ORDER — DOXAZOSIN 2 MG/1
6 TABLET ORAL AT BEDTIME
Qty: 270 TABLET | Refills: 3 | Status: SHIPPED | OUTPATIENT
Start: 2023-08-13 | End: 2024-01-10

## 2023-08-13 RX ORDER — LOSARTAN POTASSIUM 25 MG/1
25 TABLET ORAL DAILY
Qty: 90 TABLET | Refills: 3 | Status: SHIPPED | OUTPATIENT
Start: 2023-08-13 | End: 2024-06-18

## 2023-08-13 RX ORDER — FUROSEMIDE 40 MG
TABLET ORAL
Qty: 180 TABLET | Refills: 3 | Status: SHIPPED | OUTPATIENT
Start: 2023-08-13 | End: 2024-01-10

## 2023-08-16 ENCOUNTER — OFFICE VISIT (OUTPATIENT)
Dept: DERMATOLOGY | Facility: CLINIC | Age: 58
End: 2023-08-16
Payer: COMMERCIAL

## 2023-08-16 ENCOUNTER — TELEPHONE (OUTPATIENT)
Dept: DERMATOLOGY | Facility: CLINIC | Age: 58
End: 2023-08-16

## 2023-08-16 DIAGNOSIS — Z94.4 LIVER TRANSPLANT RECIPIENT (H): Primary | ICD-10-CM

## 2023-08-16 DIAGNOSIS — L70.0 ACNE VULGARIS: ICD-10-CM

## 2023-08-16 DIAGNOSIS — L30.0 NUMMULAR ECZEMA: ICD-10-CM

## 2023-08-16 DIAGNOSIS — Z87.2 HISTORY OF ACTINIC KERATOSIS: ICD-10-CM

## 2023-08-16 PROCEDURE — 99214 OFFICE O/P EST MOD 30 MIN: CPT | Performed by: DERMATOLOGY

## 2023-08-16 RX ORDER — TRETINOIN 0.25 MG/G
CREAM TOPICAL
Qty: 45 G | Refills: 11 | Status: SHIPPED | OUTPATIENT
Start: 2023-08-16

## 2023-08-16 ASSESSMENT — PAIN SCALES - GENERAL: PAINLEVEL: NO PAIN (0)

## 2023-08-16 NOTE — PROGRESS NOTES
[facility] Dermatology Note  Encounter Date: August 16, 2023    Office Visit     Dermatology Problem List:  1. History of liver transplant 3/4/17  -tacrolimus, MMF, prednisone  SH: Works as a CMA.   ____________________________________________    Assessment & Plan:      # History of liver transplant. Discussed increased risk of skin cancer in solid organ transplant recipients due to iatrogenic immunosuppression.   - ABCDs of melanoma were discussed and self skin checks were advised.  - Sun precaution was advised including the use of sun screens of SPF 30 or higher, sun protective clothing, and avoidance of tanning beds.  - Continue regular skin examinations every 1 year; next due 5/23    # Actinic keratosis. Frontal scalp x 1. S/p cryotherapy 5/23 with resolution.   - Sun precaution was advised including the use of sun screens of SPF 30 or higher, sun protective clothing, and avoidance of tanning beds.  - Continue regular skin examinations every 1 year; next due 5/23    # Nummular eczema. Chronic, stable/improved.   - Can use Diprolene 0.05% external ointment BID prn  - Diligent moisturization.      # Acne vulgaris. Chronic, stable.  - Continue tretinoin 0.025% cream nightly. Refill provided.     # Benign lesions: Multiple benign nevi, solar lentigos, seborrheic keratoses, cherry angiomas. Explained to patient benign nature of lesion. No treatment is necessary at this time unless the lesion changes or becomes symptomatic.   - ABCDs of melanoma were discussed and self skin checks were advised.  - Sun precaution was advised including the use of sun screens of SPF 30 or higher, sun protective clothing, and avoidance of tanning beds.      Procedures Performed:   None    Follow-up: 9 month(s) in-person, or earlier for new or changing lesions    Staff     Jesse Gomez MD  Pronouns: he/him/his    Department of Dermatology  [facility]  Clinics: Phone: [phone], Fax:[phone]  [facility]: Phone: [phone] Fax: [phone]  ____________________________________________    CC: Patient presents with:  Skin Check     HPI:  Camacho baxter is a(n) 57 year old year old male  who presents today as a return patient for follow-up nummular eczema and acne vulgaris and spot check on the scalp. Last seen 3 months ago when started on tretinoin 0.025% cream for acne and augmented betamethasone 0.05% ointment for nummular eczema. Also received cryotherapy for AK on the scalp.     AK on the scalp - treated with cryotherapy at last visit. Patient reports resolved today.   2. Acne vulgaris - improved considerably on tretinoin 0.025% cream - would like refill of this.   3. Nummular eczema. Improved on PRN use of topicals. Had injection for neuropathy in lower extremities and has improved symptoms as well.     Patient is otherwise feeling well, without additional skin concerns.    Labs Reviewed:  N/A    Physical Exam:  Vitals: LMP 07/24/2023 (Approximate)   SKIN: Focused examination of scalp, face, lower extremities was performed.  - Brown macules on sun exposed areas  - Brown waxy, stuck-on appearing papules on face, trunk, and extremities.   - Brown macules and papules on face without concerning dermoscopy features  - R frontal scalp clear on exam today.   - Mild xerosis cutis but no active dermatosis on bilateral ankles.   - No other lesions of concern on areas examined.     Medications:  Current Outpatient Medications   Medication    alcohol swab prep pads    alpha-lipoic acid 600 MG capsule    amLODIPine (NORVASC) 10 MG tablet    augmented betamethasone dipropionate (DIPROLENE-AF) 0.05 % external ointment    blood glucose (NO BRAND SPECIFIED) lancets standard    blood glucose (NO BRAND SPECIFIED) test strip    blood glucose calibration (NO BRAND SPECIFIED) solution    Blood Glucose Monitoring Suppl  (CONTOUR BLOOD GLUCOSE SYSTEM) w/Device KIT    cholecalciferol (VITAMIN D3) 1000 UNIT tablet    Continuous Blood Gluc Sensor (DEXCOM G6 SENSOR) MISC    Continuous Blood Gluc Transmit (DEXCOM G6 TRANSMITTER) MISC    cyclobenzaprine (FLEXERIL) 10 MG tablet    doxazosin (CARDURA) 2 MG tablet    DULoxetine (CYMBALTA) 30 MG capsule    fluticasone (FLONASE) 50 MCG/ACT nasal spray    furosemide (LASIX) 40 MG tablet    gabapentin (NEURONTIN) 800 MG tablet    hydrOXYzine (ATARAX) 25 MG tablet    Insulin Disposable Pump (OMNIPOD 5 G6 INTRO, GEN 5,) KIT    Insulin Disposable Pump (OMNIPOD 5 G6 POD, GEN 5,) MISC    Insulin Disposable Pump (OMNIPOD 5 G6 POD, GEN 5,) MISC    Insulin Lispro (HUMALOG KWIKPEN) 200 UNIT/ML soln    insulin pen needle (BD ENE U/F) 32G X 4 MM miscellaneous    lidocaine (LIDODERM) 5 % patch    losartan (COZAAR) 25 MG tablet    methocarbamol (ROBAXIN) 500 MG tablet    metoprolol succinate ER (TOPROL XL) 200 MG 24 hr tablet    mycophenolic acid (GENERIC EQUIVALENT) 360 MG EC tablet    oxyCODONE (ROXICODONE) 5 MG tablet    patiromer (VELTASSA) 8.4 g packet    predniSONE (DELTASONE) 2.5 MG tablet    sildenafil (REVATIO) 20 MG tablet    tacrolimus (GENERIC EQUIVALENT) 1 MG capsule    tretinoin (RETIN-A) 0.025 % external cream    triamcinolone (KENALOG) 0.1 % external ointment    ursodiol (ACTIGALL) 500 MG tablet     No current facility-administered medications for this visit.        Past Medical History:   Past Medical History:   Diagnosis Date    Diabetes type 2, controlled (H) 11/10/2016    Esophageal reflux     Fibromyalgia 01/2009    dx with Dr Benitez( Rheum)    Gangrene of finger (H) 08/25/2017    H/O deep venous thrombosis 11/2001    Permanent IVC filter in place.    H/O CASTAÑEDA (nonalcoholic steatohepatitis)     H/O Pneumonia, organism unspecified(486) 10/2001    Included ARDS, sepsis, and  acute renal failure; hospitalized, required tracheostomy placement.    H/O: HTN (hypertension) 11/2001    No  longer prescribed antihypertensive medication.      History of hepatocellular carcinoma     History of liver transplant (H)     History of obstructive sleep apnea     No longer wears CPAP since losing approximately 200 pounds with his liver transplant and its complications.      HLD (hyperlipidemia)     Ischemia of both lower extremities 08/25/2017    Distal ischemia due to shock/high pressor requirements    Liver transplant rejection (H) 06/11/2018    Neutropenic colitis (H) 07/04/2017    Osteoarthritis     Presence of PERMANENT IVC filter     Rheumatoid arthritis(714.0)     remission for many years

## 2023-08-16 NOTE — NURSING NOTE
Chief Complaint   Patient presents with    Skin Check     No new areas of concern     Juarez Severino, EMT

## 2023-08-16 NOTE — LETTER
8/16/2023       RE: Camacho Bhagat  [address] [facility] [address]     Dear Colleague,    Thank you for referring your patient, Camacho Bhagat, to the [facility] DERMAT[facility] [place] at [facility]. Please see a copy of my visit note below.    [facility] Dermatology Note  Encounter Date: August 16, 2023    Office Visit     Dermatology Problem List:  1. History of liver transplant 3/4/17  -tacrolimus, MMF, prednisone  SH: Works as a CMA.   ____________________________________________    Assessment & Plan:     # History of  Liver solid organ transplantation in the setting of CASTAÑEDA and hepatocellular carcinoma.   - We reviewed with the patient that due to the immunosuppressive medications used following transplant, solid organ transplant recipients are at a roughly 65-fold increased risk of squamous cell carcinoma, 20-fold increased risk of basal cell carcinoma, and 3.4 fold increased risk of melanoma compared to the general population.   - We briefly reviewed prevention methods for reducing skin cancer after transplantation including avoidance of sun exposure, avoidance of indoor tanning beds or other indoor tanning devices, use of protective clothing (e.g. wide-brimmed hats, long sleeves, long pants), SPF 30 or greater sunscreen, and UV-protective sunglasses when outdoors.   - We discussed self skin examinations and partner-skin examinations.       # History of liver  transplant. Discussed increased risk of skin cancer in solid organ transplant recipients due to iatrogenic immunosuppression.   - ABCDs of melanoma were discussed and self skin checks were advised.  - Sun precaution was advised including the use of sun screens of SPF 30 or higher, sun protective clothing, and avoidance of tanning beds.  - Continue regular skin examinations every 1 year; next due 5/23    # Actinic keratosis. Frontal scalp x 1. S/p cryotherapy 5/23  with resolution.   - Sun precaution was advised including the use of sun screens of SPF 30 or higher, sun protective clothing, and avoidance of tanning beds.  - Continue regular skin examinations every 1 year; next due 5/23    # Nummular eczema. Chronic, stable/improved.   - Can use Diprolene 0.05% external ointment BID prn  - Diligent moisturization.      # Acne vulgaris. Chronic, stable.  - Continue tretinoin 0.025% cream nightly. Refill provided.     # Benign lesions: Multiple benign nevi, solar lentigos, seborrheic keratoses, cherry angiomas. Explained to patient benign nature of lesion. No treatment is necessary at this time unless the lesion changes or becomes symptomatic.   - ABCDs of melanoma were discussed and self skin checks were advised.  - Sun precaution was advised including the use of sun screens of SPF 30 or higher, sun protective clothing, and avoidance of tanning beds.      Procedures Performed:   None    Follow-up: 9 month(s) in-person, or earlier for new or changing lesions    Staff     Jesse Gomez MD  Pronouns: he/him/his    Department of Dermatology  [facility]: Phone: [phone], Fax:[phone]  [facility]: Phone: [phone] Fax: [phone]  ____________________________________________    CC: Patient presents with:  Skin Check     HPI:  Camacho baxter is a(n) 57 year old year old male  who presents today as a return patient for follow-up nummular eczema and acne vulgaris and spot check on the scalp. Last seen 3 months ago when started on tretinoin 0.025% cream for acne and augmented betamethasone 0.05% ointment for nummular eczema. Also received cryotherapy for AK on the scalp.     AK on the scalp - treated with cryotherapy at last visit. Patient reports resolved today.   2. Acne vulgaris - improved considerably on tretinoin 0.025% cream - would like refill  of this.   3. Nummular eczema. Improved on PRN use of topicals. Had injection for neuropathy in lower extremities and has improved symptoms as well.     Patient is otherwise feeling well, without additional skin concerns.    Labs Reviewed:  N/A    Physical Exam:  Vitals: LMP 07/24/2023 (Approximate)   SKIN: Focused examination of scalp, face, lower extremities was performed.  - Brown macules on sun exposed areas  - Brown waxy, stuck-on appearing papules on face, trunk, and extremities.   - Brown macules and papules on face without concerning dermoscopy features  - R frontal scalp clear on exam today.   - Mild xerosis cutis but no active dermatosis on bilateral ankles.   - No other lesions of concern on areas examined.     Medications:  Current Outpatient Medications   Medication    alcohol swab prep pads    alpha-lipoic acid 600 MG capsule    amLODIPine (NORVASC) 10 MG tablet    augmented betamethasone dipropionate (DIPROLENE-AF) 0.05 % external ointment    blood glucose (NO BRAND SPECIFIED) lancets standard    blood glucose (NO BRAND SPECIFIED) test strip    blood glucose calibration (NO BRAND SPECIFIED) solution    Blood Glucose Monitoring Suppl (CONTOUR BLOOD GLUCOSE SYSTEM) w/Device KIT    cholecalciferol (VITAMIN D3) 1000 UNIT tablet    Continuous Blood Gluc Sensor (DEXCOM G6 SENSOR) MISC    Continuous Blood Gluc Transmit (DEXCOM G6 TRANSMITTER) MISC    cyclobenzaprine (FLEXERIL) 10 MG tablet    doxazosin (CARDURA) 2 MG tablet    DULoxetine (CYMBALTA) 30 MG capsule    fluticasone (FLONASE) 50 MCG/ACT nasal spray    furosemide (LASIX) 40 MG tablet    gabapentin (NEURONTIN) 800 MG tablet    hydrOXYzine (ATARAX) 25 MG tablet    Insulin Disposable Pump (OMNIPOD 5 G6 INTRO, GEN 5,) KIT    Insulin Disposable Pump (OMNIPOD 5 G6 POD, GEN 5,) MISC    Insulin Disposable Pump (OMNIPOD 5 G6 POD, GEN 5,) MISC    Insulin Lispro (HUMALOG KWIKPEN) 200 UNIT/ML soln    insulin pen needle (BD ENE U/F) 32G X 4 MM miscellaneous     lidocaine (LIDODERM) 5 % patch    losartan (COZAAR) 25 MG tablet    methocarbamol (ROBAXIN) 500 MG tablet    metoprolol succinate ER (TOPROL XL) 200 MG 24 hr tablet    mycophenolic acid (GENERIC EQUIVALENT) 360 MG EC tablet    oxyCODONE (ROXICODONE) 5 MG tablet    patiromer (VELTASSA) 8.4 g packet    predniSONE (DELTASONE) 2.5 MG tablet    sildenafil (REVATIO) 20 MG tablet    tacrolimus (GENERIC EQUIVALENT) 1 MG capsule    tretinoin (RETIN-A) 0.025 % external cream    triamcinolone (KENALOG) 0.1 % external ointment    ursodiol (ACTIGALL) 500 MG tablet     No current facility-administered medications for this visit.        Past Medical History:   Past Medical History:   Diagnosis Date    Diabetes type 2, controlled (H) 11/10/2016    Esophageal reflux     Fibromyalgia 01/2009    dx with Dr Benitez( Rheum)    Gangrene of finger (H) 08/25/2017    H/O deep venous thrombosis 11/2001    Permanent IVC filter in place.    H/O CASTAÑEDA (nonalcoholic steatohepatitis)     H/O Pneumonia, organism unspecified(486) 10/2001    Included ARDS, sepsis, and  acute renal failure; hospitalized, required tracheostomy placement.    H/O: HTN (hypertension) 11/2001    No longer prescribed antihypertensive medication.      History of hepatocellular carcinoma     History of liver transplant (H)     History of obstructive sleep apnea     No longer wears CPAP since losing approximately 200 pounds with his liver transplant and its complications.      HLD (hyperlipidemia)     Ischemia of both lower extremities 08/25/2017    Distal ischemia due to shock/high pressor requirements    Liver transplant rejection (H) 06/11/2018    Neutropenic colitis (H) 07/04/2017    Osteoarthritis     Presence of PERMANENT IVC filter     Rheumatoid arthritis(714.0)     remission for many years

## 2023-08-16 NOTE — PATIENT INSTRUCTIONS
Checking for Skin Cancer  You can help find cancer early by checking your skin each month. There are 3 main kinds of skin cancer: melanoma, basal cell carcinoma, and squamous cell carcinoma. Doing monthly skin checks is the best way to find new marks, sores, or skin changes. Follow these instructions for checking your skin.   The ABCDEs of checking moles for melanoma   Check your moles or growths for signs of melanoma using ABCDE:   Asymmetry: The sides of the mole or growth don t match.  Border: The edges are ragged, notched, or blurred.  Color: The color within the mole or growth varies. It could be black, brown, tan, white, or shades of red, gray, or blue.  Diameter: The mole or growth is larger than   inch or 6 mm (size of a pencil eraser).  Evolving: The size, shape, texture, or color of the mole or growth is changing.     ABCDE's of moles on light skin.        ABCDE's of moles on dark skin may be harder to identify.     Checking for other types of skin cancer  Basal cell carcinoma or squamous cell carcinoma cause symptoms like:     A spot or mole that looks different from all other marks on your skin  Changes in how an area feels, such as itching, tenderness, or pain  Changes in the skin's surface, such as oozing, bleeding, or scaliness  A sore that doesn't heal  New swelling, redness, or spread of color beyond the border of a mole    Who s at risk?  Anyone of any skin color can get skin cancer. But you're at greater risk if you have:   Fair skin that freckles easily and burns instead of tanning  Light-colored or red hair  Light-colored eyes  Many moles or abnormal moles on your skin  A long history of unprotected exposure to sunlight or tanning beds  A history of many blistering sunburns as a child or teen  A family history of skin cancer  Been exposed to radiation or chemicals  A weakened immune system  Been exposed to arsenic  If you've had skin cancer in the past, you're at high risk of having it again.    How to check your skin  Do your monthly skin checkups in front of a full-length mirror. Use a room with good lighting so it's easier to see. Use a hand mirror to look at hard-to-see places like your buttocks and back. You can also have a trusted friend or family member help you with these checks. Check every part of your body, including your:   Head (ears, face, neck, and scalp)  Torso (front, back, sides, and under breasts)  Arms (tops, undersides, and armpits)  Hands (palms, backs, and fingers, including under the nails)  Lower back, buttocks, and genitals  Legs (front, back, and sides)  Feet (tops, soles, toes, including under the nails, and between toes)  Watch for new spots on your skin or a spot that's changing in color, shape, size.   If you have a lot of moles, take digital photos of them each month. Make sure to take photos both up close and from a distance. These can help you see if any moles change over time.   Know your skin  Most skin changes aren't cancer. But if you see any changes in your skin, call your healthcare provider right away. Only they can tell you if a change is a problem. If you have skin cancer, seeing your provider can be the first step to getting the treatment that could save your life.   [employer] last reviewed this educational content on 10/1/2021    9941-0986 [employer]. All rights reserved. This information is not intended as a substitute for professional medical care. Always follow your healthcare professional's instructions.

## 2023-08-16 NOTE — TELEPHONE ENCOUNTER
[facility] Prior Authorization Team Request    Medication:  Tretinoin 0.025%  Dosing:Apply a pea-sized amount evenly over the face at night before bed daily  NDC (required for Medicaid members): [address]-65  Insurance Company: [employer]  BIN:[id number]  PCN: [id number]  Grp: [id number]  ID: [id number]  CoverMyMeds Key (if applicable):   Additional documentation:   Pharmacy Filling the Rx:  [facility]  Filling Pharmacy Phone:  [phone]  Contact: [facility] (P [id number]) please send all responses to this pool.  Pharmacy NPI (required for Medicaid members):[id number]

## 2023-08-17 ENCOUNTER — ANESTHESIA EVENT (OUTPATIENT)
Dept: GASTROENTEROLOGY | Facility: CLINIC | Age: 58
End: 2023-08-17
Payer: COMMERCIAL

## 2023-08-17 ENCOUNTER — HOSPITAL ENCOUNTER (OUTPATIENT)
Facility: CLINIC | Age: 58
Discharge: HOME | End: 2023-08-17
Attending: INTERNAL MEDICINE | Admitting: INTERNAL MEDICINE
Payer: COMMERCIAL

## 2023-08-17 ENCOUNTER — ANESTHESIA (OUTPATIENT)
Dept: GASTROENTEROLOGY | Facility: CLINIC | Age: 58
End: 2023-08-17
Payer: COMMERCIAL

## 2023-08-17 VITALS
SYSTOLIC BLOOD PRESSURE: 132 MMHG | OXYGEN SATURATION: 99 % | DIASTOLIC BLOOD PRESSURE: 71 MMHG | HEART RATE: 60 BPM | RESPIRATION RATE: 16 BRPM

## 2023-08-17 LAB
GLUCOSE BLDC GLUCOMTR-MCNC: 48 MG/DL (ref 70–99)
GLUCOSE BLDC GLUCOMTR-MCNC: 54 MG/DL (ref 70–99)
GLUCOSE BLDC GLUCOMTR-MCNC: 86 MG/DL (ref 70–99)
GLUCOSE BLDC GLUCOMTR-MCNC: 89 MG/DL (ref 70–99)
UPPER GI ENDOSCOPY: NORMAL

## 2023-08-17 PROCEDURE — 88305 TISSUE EXAM BY PATHOLOGIST: CPT | Mod: 26 | Performed by: PATHOLOGY

## 2023-08-17 PROCEDURE — 43239 EGD BIOPSY SINGLE/MULTIPLE: CPT | Performed by: INTERNAL MEDICINE

## 2023-08-17 PROCEDURE — 258N000001 HC RX 258: Performed by: INTERNAL MEDICINE

## 2023-08-17 PROCEDURE — 82962 GLUCOSE BLOOD TEST: CPT

## 2023-08-17 PROCEDURE — 250N000009 HC RX 250: Performed by: NURSE ANESTHETIST, CERTIFIED REGISTERED

## 2023-08-17 PROCEDURE — 258N000001 HC RX 258: Performed by: NURSE ANESTHETIST, CERTIFIED REGISTERED

## 2023-08-17 PROCEDURE — 258N000003 HC RX IP 258 OP 636: Performed by: NURSE ANESTHETIST, CERTIFIED REGISTERED

## 2023-08-17 PROCEDURE — 88305 TISSUE EXAM BY PATHOLOGIST: CPT | Mod: TC | Performed by: INTERNAL MEDICINE

## 2023-08-17 PROCEDURE — 250N000011 HC RX IP 250 OP 636: Performed by: NURSE ANESTHETIST, CERTIFIED REGISTERED

## 2023-08-17 PROCEDURE — 370N000017 HC ANESTHESIA TECHNICAL FEE, PER MIN: Performed by: INTERNAL MEDICINE

## 2023-08-17 RX ORDER — FENTANYL CITRATE 50 UG/ML
50 INJECTION, SOLUTION INTRAMUSCULAR; INTRAVENOUS EVERY 5 MIN PRN
Status: DISCONTINUED | OUTPATIENT
Start: 2023-08-17 | End: 2023-08-17 | Stop reason: HOSPADM

## 2023-08-17 RX ORDER — ONDANSETRON 4 MG/1
4 TABLET, ORALLY DISINTEGRATING ORAL EVERY 30 MIN PRN
Status: DISCONTINUED | OUTPATIENT
Start: 2023-08-17 | End: 2023-08-17 | Stop reason: HOSPADM

## 2023-08-17 RX ORDER — HYDROMORPHONE HCL IN WATER/PF 6 MG/30 ML
0.4 PATIENT CONTROLLED ANALGESIA SYRINGE INTRAVENOUS EVERY 5 MIN PRN
Status: DISCONTINUED | OUTPATIENT
Start: 2023-08-17 | End: 2023-08-17 | Stop reason: HOSPADM

## 2023-08-17 RX ORDER — LIDOCAINE 40 MG/G
CREAM TOPICAL
Status: DISCONTINUED | OUTPATIENT
Start: 2023-08-17 | End: 2023-08-17 | Stop reason: HOSPADM

## 2023-08-17 RX ORDER — HYDROMORPHONE HCL IN WATER/PF 6 MG/30 ML
0.2 PATIENT CONTROLLED ANALGESIA SYRINGE INTRAVENOUS EVERY 5 MIN PRN
Status: DISCONTINUED | OUTPATIENT
Start: 2023-08-17 | End: 2023-08-17 | Stop reason: HOSPADM

## 2023-08-17 RX ORDER — DEXTROSE MONOHYDRATE 25 G/50ML
INJECTION, SOLUTION INTRAVENOUS PRN
Status: DISCONTINUED | OUTPATIENT
Start: 2023-08-17 | End: 2023-08-17

## 2023-08-17 RX ORDER — SODIUM CHLORIDE, SODIUM LACTATE, POTASSIUM CHLORIDE, CALCIUM CHLORIDE 600; 310; 30; 20 MG/100ML; MG/100ML; MG/100ML; MG/100ML
INJECTION, SOLUTION INTRAVENOUS CONTINUOUS
Status: DISCONTINUED | OUTPATIENT
Start: 2023-08-17 | End: 2023-08-17 | Stop reason: HOSPADM

## 2023-08-17 RX ORDER — ONDANSETRON 2 MG/ML
4 INJECTION INTRAMUSCULAR; INTRAVENOUS EVERY 30 MIN PRN
Status: DISCONTINUED | OUTPATIENT
Start: 2023-08-17 | End: 2023-08-17 | Stop reason: HOSPADM

## 2023-08-17 RX ORDER — FENTANYL CITRATE 50 UG/ML
25 INJECTION, SOLUTION INTRAMUSCULAR; INTRAVENOUS EVERY 5 MIN PRN
Status: DISCONTINUED | OUTPATIENT
Start: 2023-08-17 | End: 2023-08-17 | Stop reason: HOSPADM

## 2023-08-17 RX ORDER — ONDANSETRON 2 MG/ML
4 INJECTION INTRAMUSCULAR; INTRAVENOUS
Status: DISCONTINUED | OUTPATIENT
Start: 2023-08-17 | End: 2023-08-17 | Stop reason: HOSPADM

## 2023-08-17 RX ORDER — SODIUM CHLORIDE, SODIUM LACTATE, POTASSIUM CHLORIDE, CALCIUM CHLORIDE 600; 310; 30; 20 MG/100ML; MG/100ML; MG/100ML; MG/100ML
INJECTION, SOLUTION INTRAVENOUS CONTINUOUS PRN
Status: DISCONTINUED | OUTPATIENT
Start: 2023-08-17 | End: 2023-08-17

## 2023-08-17 RX ORDER — PROPOFOL 10 MG/ML
INJECTION, EMULSION INTRAVENOUS CONTINUOUS PRN
Status: DISCONTINUED | OUTPATIENT
Start: 2023-08-17 | End: 2023-08-17

## 2023-08-17 RX ORDER — LIDOCAINE HYDROCHLORIDE 20 MG/ML
INJECTION, SOLUTION INFILTRATION; PERINEURAL PRN
Status: DISCONTINUED | OUTPATIENT
Start: 2023-08-17 | End: 2023-08-17

## 2023-08-17 RX ORDER — DEXTROSE MONOHYDRATE 25 G/50ML
25 INJECTION, SOLUTION INTRAVENOUS ONCE
Status: COMPLETED | OUTPATIENT
Start: 2023-08-17 | End: 2023-08-17

## 2023-08-17 RX ORDER — OXYCODONE HYDROCHLORIDE 10 MG/1
10 TABLET ORAL
Status: DISCONTINUED | OUTPATIENT
Start: 2023-08-17 | End: 2023-08-17 | Stop reason: HOSPADM

## 2023-08-17 RX ORDER — OXYCODONE HYDROCHLORIDE 5 MG/1
5 TABLET ORAL
Status: DISCONTINUED | OUTPATIENT
Start: 2023-08-17 | End: 2023-08-17 | Stop reason: HOSPADM

## 2023-08-17 RX ORDER — PROPOFOL 10 MG/ML
INJECTION, EMULSION INTRAVENOUS PRN
Status: DISCONTINUED | OUTPATIENT
Start: 2023-08-17 | End: 2023-08-17

## 2023-08-17 RX ADMIN — DEXTROSE MONOHYDRATE 25 ML: 25 INJECTION, SOLUTION INTRAVENOUS at 13:55

## 2023-08-17 RX ADMIN — TOPICAL ANESTHETIC 1 SPRAY: 200 SPRAY DENTAL; PERIODONTAL at 14:55

## 2023-08-17 RX ADMIN — PROPOFOL 50 MG: 10 INJECTION, EMULSION INTRAVENOUS at 14:58

## 2023-08-17 RX ADMIN — SODIUM CHLORIDE, POTASSIUM CHLORIDE, SODIUM LACTATE AND CALCIUM CHLORIDE: 600; 310; 30; 20 INJECTION, SOLUTION INTRAVENOUS at 14:58

## 2023-08-17 RX ADMIN — LIDOCAINE HYDROCHLORIDE 60 MG: 20 INJECTION, SOLUTION INFILTRATION; PERINEURAL at 14:58

## 2023-08-17 RX ADMIN — DEXTROSE 50 % IN WATER (D50W) INTRAVENOUS SYRINGE 25 ML: at 15:17

## 2023-08-17 RX ADMIN — PROPOFOL 30 MG: 10 INJECTION, EMULSION INTRAVENOUS at 15:05

## 2023-08-17 RX ADMIN — PROPOFOL 150 MCG/KG/MIN: 10 INJECTION, EMULSION INTRAVENOUS at 15:00

## 2023-08-17 ASSESSMENT — ACTIVITIES OF DAILY LIVING (ADL)
ADLS_ACUITY_SCORE: 35
ADLS_ACUITY_SCORE: 37

## 2023-08-17 NOTE — ANESTHESIA PREPROCEDURE EVALUATION
Anesthesia Pre-Procedure Evaluation    Patient: Camacho Bhagat   MRN: [id number] : 1965        Procedure : Procedure(s):  Esophagoscopy, gastroscopy, duodenoscopy (EGD), combined          Past Medical History:   Diagnosis Date    Diabetes type 2, controlled (H) 11/10/2016    Esophageal reflux     Fibromyalgia 2009    dx with Dr Benitez( Rheum)    Gangrene of finger (H) 2017    H/O deep venous thrombosis 2001    Permanent IVC filter in place.    H/O CASTAÑEDA (nonalcoholic steatohepatitis)     H/O Pneumonia, organism unspecified(486) 10/2001    Included ARDS, sepsis, and  acute renal failure; hospitalized, required tracheostomy placement.    H/O: HTN (hypertension) 2001    No longer prescribed antihypertensive medication.      History of hepatocellular carcinoma     History of liver transplant (H)     History of obstructive sleep apnea     No longer wears CPAP since losing approximately 200 pounds with his liver transplant and its complications.      HLD (hyperlipidemia)     Ischemia of both lower extremities 2017    Distal ischemia due to shock/high pressor requirements    Liver transplant rejection (H) 2018    Neutropenic colitis (H) 2017    Osteoarthritis     Presence of PERMANENT IVC filter     Rheumatoid arthritis(714.0)     remission for many years      Past Surgical History:   Procedure Laterality Date    ARTHROSCOPY KNEE WITH MENISCAL REPAIR Right 2008    Right knee arthroscopy    BENCH LIVER N/A 2017    Procedure: BENCH LIVER;  Surgeon: Jovan Tran MD;  Location: [facility] OR    CHOLECYSTECTOMY      COLONOSCOPY N/A 2017    Procedure: COMBINED COLONOSCOPY, SINGLE OR MULTIPLE BIOPSY/POLYPECTOMY BY BIOPSY;  Colonoscopy;  Surgeon: Izaiah Montes MD;  Location: [facility] GI    COLONOSCOPY N/A 10/24/2022    Procedure: COLONOSCOPY, WITH POLYPECTOMY AND BIOPSY;  Surgeon: Abril Mcneill MD;  Location: [facility] GI    ENDOSCOPIC RETROGRADE CHOLANGIOPANCREATOGRAM N/A  05/22/2018    Procedure: COMBINED ENDOSCOPIC RETROGRADE CHOLANGIOPANCREATOGRAPHY, PLACE TUBE/STENT;  Endoscopic Retrograde Cholangiopancreatography with sphincterotomy and pancreatic duct stent placement;  Surgeon: Zay Gaitan MD;  Location: [facility] OR    ENT SURGERY      Repair of deviated septum    ESOPHAGOSCOPY, GASTROSCOPY, DUODENOSCOPY (EGD), COMBINED N/A 08/04/2016    Procedure: COMBINED ESOPHAGOSCOPY, GASTROSCOPY, DUODENOSCOPY (EGD), BIOPSY SINGLE OR MULTIPLE;  Surgeon: Trent Pederson MD;  Location:  GI    ESOPHAGOSCOPY, GASTROSCOPY, DUODENOSCOPY (EGD), COMBINED N/A 08/27/2017    Procedure: COMBINED ESOPHAGOSCOPY, GASTROSCOPY, DUODENOSCOPY (EGD);;  Surgeon: Los Wynn MD;  Location:  GI    INCISION AND DRAINAGE ABDOMEN WASHOUT, COMBINED Right 02/14/2018    Procedure: COMBINED INCISION AND DRAINAGE ABDOMEN WASHOUT;  COMBINED INCISION AND DRAINAGE right ABDOMEN flank;  Surgeon: Sara Dinh MD;  [facility]: [facility]    LAPAROTOMY EXPLORATORY N/A 07/04/2017    Procedure: LAPAROTOMY EXPLORATORY;  Exploratory Laparotomy, washout;  Surgeon: Tip Zhang MD;  [facility]: [facility]    LAPAROTOMY EXPLORATORY N/A 07/05/2017    Procedure: LAPAROTOMY EXPLORATORY;  Exploratory Laparotomy, Washout with closure.;  Surgeon: Tip Zhang MD;  [facility]: [facility]    LAPAROTOMY EXPLORATORY N/A 07/26/2017    Procedure: LAPAROTOMY EXPLORATORY;  Exploratory Laparotomy, Right angelique-colectomy, end ileostomy, mucosal fistula, partial omentectomy;  Surgeon: Sara Dinh MD;  [facility]    ORTHOPEDIC SURGERY Right     Repair of dislocated wrist.      RELEASE TRIGGER FINGER Right 11/10/2017    Procedure: RELEASE TRIGGER FINGER;  Debride, V-Y Flap Right Index Finger;  Surgeon: Momo Noonan MD;  [facility] OR    TAKEDOWN ILEOSTOMY N/A 01/05/2018    Procedure: TAKEDOWN ILEOSTOMY;  Exploratory Laparotomy, Lysis of Adhesions, Takedown Of End Ileostomy, Takedown of  mucocutaneous fistula, ileocolic resection  And Colorectal Anastomosis, Primary repair of Ventral Hernia, Anesthesia Block ;  Surgeon: Sara Dinh MD;  Location: [facility]    TRACHEOSTOMY      During hospitalization for pneumonia.      TRANSPLANT LIVER RECIPIENT  DONOR N/A 2017    Procedure: TRANSPLANT LIVER RECIPIENT  DONOR;  Surgeon: Jovan Tran MD;  Location: [facility]      Allergies   Allergen Reactions    Erythromycin GI Disturbance    Losartan Other (See Comments)     Consistently develops hyperkalemia    Vioxx      Nausea, vomiting      Social History     Tobacco Use    Smoking status: Former     Packs/day: 0.25     Years: 2.00     Pack years: 0.50     Types: Cigarettes     Quit date:      Years since quittin.6    Smokeless tobacco: Former     Types: Chew     Quit date: 10/8/2015    Tobacco comments:     1 Cigar once every other month.   Substance Use Topics    Alcohol use: No     Alcohol/week: 0.0 standard drinks of alcohol     Comment: No alcohol since liver transplant.        Wt Readings from Last 1 Encounters:   23 129.3 kg (285 lb)        Anesthesia Evaluation   Pt has had prior anesthetic. Type: General.        ROS/MED HX  ENT/Pulmonary:     (+) sleep apnea, uses CPAP,                                     Neurologic:       Cardiovascular:     (+) Dyslipidemia hypertension- -   -  - -                                      METS/Exercise Tolerance:     Hematologic:     (+) History of blood clots,    pt is not anticoagulated, anemia,          Musculoskeletal:   (+)  arthritis,             GI/Hepatic:     (+) GERD,          hepatitis  liver disease (liver transplant 3/4/17),       Renal/Genitourinary:     (+) renal disease, type: CRI,            Endo:     (+)  type II DM,             Obesity,       Psychiatric/Substance Use:       Infectious Disease:       Malignancy:       Other:            Physical Exam    Airway        Mallampati: III   TM distance:  > 3 FB   Neck ROM: full   Mouth opening: < 3 cm    Respiratory Devices and Support         Dental       (+) Modest Abnormalities - crowns, retainers, 1 or 2 missing teeth      Cardiovascular   cardiovascular exam normal          Pulmonary   pulmonary exam normal                OUTSIDE LABS:  CBC:   Lab Results   Component Value Date    WBC 6.1 07/31/2023    WBC 5.3 04/19/2023    HGB 10.8 (L) 07/31/2023    HGB 11.5 (L) 04/19/2023    HCT 31.9 (L) 07/31/2023    HCT 34.4 (L) 04/19/2023     (L) 07/31/2023     (L) 04/19/2023     BMP:   Lab Results   Component Value Date     07/31/2023     07/31/2023    POTASSIUM 4.7 07/31/2023    POTASSIUM 4.7 07/31/2023    CHLORIDE 107 07/31/2023    CHLORIDE 107 07/31/2023    CO2 21 (L) 07/31/2023    CO2 20 (L) 07/31/2023    BUN 35.1 (H) 07/31/2023    BUN 35.5 (H) 07/31/2023    CR 1.58 (H) 07/31/2023    CR 1.57 (H) 07/31/2023    GLC 77 07/31/2023    GLC 75 07/31/2023     COAGS:   Lab Results   Component Value Date    PTT 39 (H) 07/26/2017    INR 0.89 06/01/2018    FIBR 427 (H) 10/25/2017     POC:   Lab Results   Component Value Date     (H) 06/01/2018     HEPATIC:   Lab Results   Component Value Date    ALBUMIN 4.3 07/31/2023    ALBUMIN 4.4 07/31/2023    PROTTOTAL 6.7 07/31/2023    ALT 27 07/31/2023    AST 32 07/31/2023    GGT 58 07/11/2017    ALKPHOS 173 (H) 07/31/2023    BILITOTAL 0.5 07/31/2023    CLAUDIA <10 (L) 07/07/2017     OTHER:   Lab Results   Component Value Date    PH 7.33 (L) 08/10/2017    LACT 1.7 02/12/2018    A1C 5.7 (H) 07/31/2023    JACOB 9.3 07/31/2023    JACOB 9.3 07/31/2023    PHOS 3.6 07/31/2023    MAG 2.1 06/08/2021    LIPASE 161 05/22/2018    AMYLASE 70 05/22/2018    TSH 3.12 04/19/2023    T4 1.23 04/11/2011    CRP 3.3 08/21/2020    SED 22 (H) 08/21/2020       Anesthesia Plan    ASA Status:  3       Anesthesia Type: MAC.   Induction: Propofol.   Maintenance: TIVA.        Consents    Anesthesia Plan(s) and associated risks, benefits, and  realistic alternatives discussed. Questions answered and patient/representative(s) expressed understanding.     - Discussed: Risks, Benefits and Alternatives for the PROCEDURE were discussed     - Discussed with:  Patient      - Extended Intubation/Ventilatory Support Discussed: No.      - Patient is DNR/DNI Status: No     Use of blood products discussed: No .     Postoperative Care    Pain management: IV analgesics, Multi-modal analgesia.   PONV prophylaxis: Ondansetron (or other 5HT-3), Dexamethasone or Solumedrol     Comments:                MIRNA MARLEY MD

## 2023-08-17 NOTE — ANESTHESIA POSTPROCEDURE EVALUATION
Patient: Camacho Bhagat    Procedure: Procedure(s):  Esophagoscopy, gastroscopy, duodenoscopy (EGD), combined       Anesthesia Type:  MAC    Note:  Disposition: Outpatient   Postop Pain Control: Uneventful            Sign Out: Well controlled pain   PONV: No   Neuro/Psych: Uneventful            Sign Out: Acceptable/Baseline neuro status   Airway/Respiratory: Uneventful            Sign Out: Acceptable/Baseline resp. status   CV/Hemodynamics: Uneventful            Sign Out: Acceptable CV status; No obvious hypovolemia; No obvious fluid overload   Other NRE: NONE   DID A NON-ROUTINE EVENT OCCUR? No       Last vitals:  Vitals Value Taken Time   /71 08/17/23 1521   Temp     Pulse 55 08/17/23 1521   Resp 16 08/17/23 1520   SpO2 98 % 08/17/23 1540   Vitals shown include unvalidated device data.    Electronically Signed By: Jermain Colin MD  August 17, 2023  3:42 PM

## 2023-08-17 NOTE — OR NURSING
Pt underwent EGD under MAC. Specimens: sent to lab.  Pt transferred to recovery and report given to mele JEFFREY.       Sanjuanita Calderon RN

## 2023-08-17 NOTE — H&P
Camacho MOORE Olayinka  [id number]  male  57 year old      Reason for procedure/surgery: diarrhea/transplant    Patient Active Problem List   Diagnosis    Carpal tunnel syndrome    Testicular hypofunction    Sicca syndrome (H)    Hepatocellular carcinoma (H)    Osteoarthritis    Rheumatoid arthritis (H)    Hyperkalemia    Liver transplant recipient (H)    Immunosuppressed status (H)    Neutropenic colitis (H)    S/P liver transplant (H)    Pancytopenia (H)    Ischemia of both lower extremities    Elevated serum creatinine    Abscess, intra-abdominal, postoperative    Liver transplant rejection (H)    CMV colitis (H)    Type 2 diabetes mellitus with diabetic polyneuropathy, with long-term current use of insulin (H)    Diarrhea of infectious origin (Plesiomonas shigelloides (formerly known Aeromonas shigelloides) in 3/14/2019 sample)    Hypertension secondary to other renal disorders    Chronic kidney disease, stage 3 (H)    Morbid obesity (H)    F11.9 - Chronic, continuous use of opioids    Fibromyalgia       Past Surgical History:    Past Surgical History:   Procedure Laterality Date    ARTHROSCOPY KNEE WITH MENISCAL REPAIR Right 2008    Right knee arthroscopy    BENCH LIVER N/A 03/04/2017    Procedure: BENCH LIVER;  Surgeon: Jovan Tran MD;  [facility]    CHOLECYSTECTOMY      COLONOSCOPY N/A 07/21/2017    Procedure: COMBINED COLONOSCOPY, SINGLE OR MULTIPLE BIOPSY/POLYPECTOMY BY BIOPSY;  Colonoscopy;  Surgeon: Izaiah Montes MD;  [facility]    COLONOSCOPY N/A 10/24/2022    Procedure: COLONOSCOPY, WITH POLYPECTOMY AND BIOPSY;  Surgeon: Abril Mcneill MD;  [facility]    ENDOSCOPIC RETROGRADE CHOLANGIOPANCREATOGRAM N/A 05/22/2018    Procedure: COMBINED ENDOSCOPIC RETROGRADE CHOLANGIOPANCREATOGRAPHY, PLACE TUBE/STENT;  Endoscopic Retrograde Cholangiopancreatography with sphincterotomy and pancreatic duct stent placement;  Surgeon: Zay Gaitan MD;  [facility]    ENT SURGERY       Repair of deviated septum    ESOPHAGOSCOPY, GASTROSCOPY, DUODENOSCOPY (EGD), COMBINED N/A 08/04/2016    Procedure: COMBINED ESOPHAGOSCOPY, GASTROSCOPY, DUODENOSCOPY (EGD), BIOPSY SINGLE OR MULTIPLE;  Surgeon: Trent Pederson MD;  Location: [facility]    ESOPHAGOSCOPY, GASTROSCOPY, DUODENOSCOPY (EGD), COMBINED N/A 08/27/2017    Procedure: COMBINED ESOPHAGOSCOPY, GASTROSCOPY, DUODENOSCOPY (EGD);;  Surgeon: Los Wynn MD;  [facility]    INCISION AND DRAINAGE ABDOMEN WASHOUT, COMBINED Right 02/14/2018    Procedure: COMBINED INCISION AND DRAINAGE ABDOMEN WASHOUT;  COMBINED INCISION AND DRAINAGE right ABDOMEN flank;  Surgeon: Sara Dinh MD;  [facility]    LAPAROTOMY EXPLORATORY N/A 07/04/2017    Procedure: LAPAROTOMY EXPLORATORY;  Exploratory Laparotomy, washout;  Surgeon: Tip Zhang MD;  [facility]    LAPAROTOMY EXPLORATORY N/A 07/05/2017    Procedure: LAPAROTOMY EXPLORATORY;  Exploratory Laparotomy, Washout with closure.;  Surgeon: Tip Zhang MD;  [facility]    LAPAROTOMY EXPLORATORY N/A 07/26/2017    Procedure: LAPAROTOMY EXPLORATORY;  Exploratory Laparotomy, Right angelique-colectomy, end ileostomy, mucosal fistula, partial omentectomy;  Surgeon: Sara Dinh MD;  [facility]    ORTHOPEDIC SURGERY Right     Repair of dislocated wrist.      RELEASE TRIGGER FINGER Right 11/10/2017    Procedure: RELEASE TRIGGER FINGER;  Debride, V-Y Flap Right Index Finger;  Surgeon: Momo Noonan MD;  [facility]    TAKEDOWN ILEOSTOMY N/A 01/05/2018    Procedure: TAKEDOWN ILEOSTOMY;  Exploratory Laparotomy, Lysis of Adhesions, Takedown Of End Ileostomy, Takedown of mucocutaneous fistula, ileocolic resection  And Colorectal Anastomosis, Primary repair of Ventral Hernia, Anesthesia Block ;  Surgeon: Sara Dinh MD;  [facility]    TRACHEOSTOMY  2001    During hospitalization for pneumonia.      TRANSPLANT LIVER RECIPIENT   DONOR N/A 2017    Procedure: TRANSPLANT LIVER RECIPIENT  DONOR;  Surgeon: Jovan Tran MD;  Location:  OR       Past Medical History:   Past Medical History:   Diagnosis Date    Diabetes type 2, controlled (H) 11/10/2016    Esophageal reflux     Fibromyalgia 2009    dx with Dr Benitez( Rheum)    Gangrene of finger (H) 2017    H/O deep venous thrombosis 2001    Permanent IVC filter in place.    H/O CASTAÑEDA (nonalcoholic steatohepatitis)     H/O Pneumonia, organism unspecified(486) 10/2001    Included ARDS, sepsis, and  acute renal failure; hospitalized, required tracheostomy placement.    H/O: HTN (hypertension) 2001    No longer prescribed antihypertensive medication.      History of hepatocellular carcinoma     History of liver transplant (H)     History of obstructive sleep apnea     No longer wears CPAP since losing approximately 200 pounds with his liver transplant and its complications.      HLD (hyperlipidemia)     Ischemia of both lower extremities 2017    Distal ischemia due to shock/high pressor requirements    Liver transplant rejection (H) 2018    Neutropenic colitis (H) 2017    Osteoarthritis     Presence of PERMANENT IVC filter     Rheumatoid arthritis(714.0)     remission for many years       Social History:   Social History     Tobacco Use    Smoking status: Former     Packs/day: 0.25     Years: 2.00     Pack years: 0.50     Types: Cigarettes     Quit date:      Years since quittin.6    Smokeless tobacco: Former     Types: Chew     Quit date: 10/8/2015    Tobacco comments:     1 Cigar once every other month.   Substance Use Topics    Alcohol use: No     Alcohol/week: 0.0 standard drinks of alcohol     Comment: No alcohol since liver transplant.         Family History:   Family History   Problem Relation Age of Onset    Diabetes Father     Hypertension Father     Substance Abuse Father     Arthritis Mother     Thyroid Cancer  Mother         Survivor!    Cervical Cancer Maternal Grandmother     Cerebrovascular Disease Maternal Grandfather     No Known Problems Paternal Grandmother     Prostate Cancer Paternal Grandfather     Colon Cancer No family hx of     Hyperlipidemia No family hx of     Coronary Artery Disease No family hx of     Breast Cancer No family hx of        Allergies:   Allergies   Allergen Reactions    Erythromycin GI Disturbance    Losartan Other (See Comments)     Consistently develops hyperkalemia    Vioxx      Nausea, vomiting       Active Medications:   No current outpatient medications on file.       Systemic Review:   CONSTITUTIONAL: NEGATIVE for fever, chills, change in weight  ENT/MOUTH: NEGATIVE for ear, mouth and throat problems  RESP: NEGATIVE for significant cough or SOB  CV: NEGATIVE for chest pain, palpitations or peripheral edema    Physical Examination:   Vital Signs: /78   Pulse 68   Resp 16   SpO2 98%   GENERAL: healthy, alert and no distress  NECK: no adenopathy, no asymmetry, masses, or scars  RESP: lungs clear to auscultation - no rales, rhonchi or wheezes  CV: regular rate and rhythm, normal S1 S2, no S3 or S4, no murmur, click or rub, no peripheral edema and peripheral pulses strong  ABDOMEN: soft, nontender, no hepatosplenomegaly, no masses and bowel sounds normal  MS: no gross musculoskeletal defects noted, no edema    ASA: 2    Mallampati Score: 2    Plan: Appropriate to proceed as scheduled.      Trent Villanueva MD  8/17/2023    PCP:  Shay Kirkpatrick

## 2023-08-17 NOTE — ANESTHESIA CARE TRANSFER NOTE
Patient: Camacho Bhagat    Procedure: Procedure(s):  Esophagoscopy, gastroscopy, duodenoscopy (EGD), combined       Diagnosis: Chronic diarrhea of unknown origin [K52.9]  Diagnosis Additional Information: No value filed.    Anesthesia Type:   MAC     Note:    Oropharynx: oropharynx clear of all foreign objects and spontaneously breathing  Level of Consciousness: awake  Oxygen Supplementation: room air    Independent Airway: airway patency satisfactory and stable  Dentition: dentition unchanged  Vital Signs Stable: post-procedure vital signs reviewed and stable  Report to RN Given: handoff report given  Patient transferred to: Phase II    Handoff Report: Identifed the Patient, Identified the Reponsible Provider, Reviewed the pertinent medical history, Discussed the surgical course, Reviewed Intra-OP anesthesia mangement and issues during anesthesia, Set expectations for post-procedure period and Allowed opportunity for questions and acknowledgement of understanding      Vitals:  Vitals Value Taken Time   /83 08/17/23 1539   Temp     Pulse 57 08/17/23 1539   Resp 16 08/17/23 1539   SpO2 99 % 08/17/23 1541   Vitals shown include unvalidated device data.    Electronically Signed By: LAMIN Thibodeaux CRNA  August 17, 2023  3:54 PM

## 2023-08-21 NOTE — TELEPHONE ENCOUNTER
PA Initiation    Medication: TRETINOIN 0.025 % EX CREA  Insurance Company: [employer] - Phone [phone] Fax [phone]  Pharmacy Filling the Rx: [place], [place] - [address] [place] [address] [id number]  Filling Pharmacy Phone: [phone]  Filling Pharmacy Fax: [phone]  Start Date: 8/21/2023       Pt states \"the cramping feels fine now, I'm just standing here because I keep having to have another bowel movement. I'm going to order as much food as I can now.\" She continues to decline the bentyl at this time stating that she will let us know if she changes her mind.

## 2023-08-22 NOTE — TELEPHONE ENCOUNTER
Prior Authorization Approval    Medication: TRETINOIN 0.025 % EX CREA  Authorization Effective Date: 8/21/2023  Authorization Expiration Date: 8/19/2024  Approved Dose/Quantity:   Reference #:     Insurance Company: [employer] - Phone [phone] Fax [phone]  Expected CoPay:       CoPay Card Available:      Financial Assistance Needed:   Which Pharmacy is filling the prescription: [facility] [address] 4-294  Pharmacy Notified: Yes  Patient Notified: Yes **Instructed pharmacy to notify patient when script is ready to /ship.**

## 2023-08-24 ENCOUNTER — TELEPHONE (OUTPATIENT)
Dept: DERMATOLOGY | Facility: CLINIC | Age: 58
End: 2023-08-24
Payer: COMMERCIAL

## 2023-09-01 DIAGNOSIS — M51.369 DDD (DEGENERATIVE DISC DISEASE), LUMBAR: ICD-10-CM

## 2023-09-01 DIAGNOSIS — M51.16 LUMBAR DISC HERNIATION WITH RADICULOPATHY: Primary | ICD-10-CM

## 2023-09-12 ENCOUNTER — TELEPHONE (OUTPATIENT)
Dept: ENDOCRINOLOGY | Facility: CLINIC | Age: 58
End: 2023-09-12

## 2023-09-12 DIAGNOSIS — M54.50 CHRONIC LOW BACK PAIN: ICD-10-CM

## 2023-09-12 DIAGNOSIS — M48.061 LUMBAR SPINAL STENOSIS: Primary | ICD-10-CM

## 2023-09-12 DIAGNOSIS — G89.29 CHRONIC LOW BACK PAIN: ICD-10-CM

## 2023-09-12 NOTE — TELEPHONE ENCOUNTER
*pt requests pa for a 90 day supply*        [facility]    Fax: [phone]    Spec: [phone]    MO: [phone]

## 2023-09-14 NOTE — TELEPHONE ENCOUNTER
PA Initiation    Medication: HUMALOG KWIKPEN 200 UNIT/ML SC SOPN  Insurance Company: [employer] - Phone [phone] Fax [phone]  Pharmacy Filling the Rx: [facility] - [place], [place] - [facility] [address]  Filling Pharmacy Phone: [phone]  Filling Pharmacy Fax:    Start Date: 9/14/2023

## 2023-09-18 ENCOUNTER — VIRTUAL VISIT (OUTPATIENT)
Dept: TRANSPLANT | Facility: CLINIC | Age: 58
End: 2023-09-18
Attending: NURSE PRACTITIONER
Payer: COMMERCIAL

## 2023-09-18 ENCOUNTER — TELEPHONE (OUTPATIENT)
Dept: TRANSPLANT | Facility: CLINIC | Age: 58
End: 2023-09-18
Payer: COMMERCIAL

## 2023-09-18 DIAGNOSIS — Z94.4 LIVER TRANSPLANT RECIPIENT (H): ICD-10-CM

## 2023-09-18 DIAGNOSIS — Z94.4 LIVER TRANSPLANT RECIPIENT (H): Primary | ICD-10-CM

## 2023-09-18 DIAGNOSIS — U07.1 INFECTION DUE TO 2019 NOVEL CORONAVIRUS: Primary | ICD-10-CM

## 2023-09-18 PROCEDURE — 99203 OFFICE O/P NEW LOW 30 MIN: CPT | Mod: 93 | Performed by: NURSE PRACTITIONER

## 2023-09-18 NOTE — PROGRESS NOTES
Camacho is a 57 year old who is being evaluated via a billable telephone visit.      What phone number would you like to be contacted at? Cell phone  How would you like to obtain your AVS? MyChart    Distant Location (provider location):  On-site    Assessment & Plan     Infection due to 2019 novel coronavirus    - nirmatrelvir and ritonavir (PAXLOVID) 150 mg/100 mg therapy pack; Take 2 tablets by mouth 2 times daily for 5 days (Take one tablet of Nirmatelvir and 1 tablet of Ritonavir twice daily for 5 days)    Review of the result(s) of each unique test - covid19  No LOS data to display   Time spent by me doing chart review, history and exam, documentation and further activities per the note       COVID-19 positive patient.  Encounter for consideration of medication intervention. Patient does qualify for a prescription. Full discussion with patient including medication options, risks and benefits. Potential drug interactions reviewed with patient.     Treatment Planned Paxlovid at decreased dosing due to renal insufficiency, Rx sent to [place] [facility]  [facility] in [place]  Temporary change to home medications: hold tac, oxycodone,   Estimated body mass index is 38.65 kg/m  as calculated from the following:    Height as of 6/23/23: 1.829 m (6').    Weight as of 6/23/23: 129.3 kg (285 lb).  GFR Estimate   Date Value Ref Range Status   07/31/2023 50 (L) >60 mL/min/1.73m2 Final   07/31/2023 51 (L) >60 mL/min/1.73m2 Final   06/08/2021 51 (L) >60 mL/min/[1.73_m2] Final     Comment:     Non  GFR Calc  Starting 12/18/2018, serum creatinine based estimated GFR (eGFR) will be   calculated using the Chronic Kidney Disease Epidemiology Collaboration   (CKD-EPI) equation.       Lab Results   Component Value Date    [id number] Negative 04/22/2022       No follow-ups on file.    Dora Elizabeth NP  [facility]    Subjective   Camacho is a 57 year old, presenting for the following health  issues:  No chief complaint on file.    HPI     Took two tests that were negative and now positive but with older test.  May retest  Has symptoms of head cold.  No fevers.  Started Saturday       Review of Systems   Constitutional, HEENT, cardiovascular, pulmonary, gi and gu systems are negative, except as otherwise noted.      Objective           Vitals:  No vitals were obtained today due to virtual visit.    Physical Exam     Remainder of exam unable to be completed due to telephone visits    Covid-19 test            Phone call duration: 15 minutes

## 2023-09-18 NOTE — TELEPHONE ENCOUNTER
Patient Call: General  Route to LPN    Reason for call: Camacho calling to inform Coordinator that he tested positive for covid this morning, has a temp of 100.1 very congested.    Call back needed? Yes    Return Call Needed  Same as documented in contacts section  When to return call?: Same day: Route High Priority

## 2023-09-18 NOTE — LETTER
9/18/2023         RE: Camacho Bhagat  [address] [facility] [address]        Dear Colleague,    Thank you for referring your patient, Camacho Bhagat, to the [facility]. Please see a copy of my visit note below.      Assessment & Plan    Infection due to 2019 novel coronavirus    - nirmatrelvir and ritonavir (PAXLOVID) 150 mg/100 mg therapy pack; Take 2 tablets by mouth 2 times daily for 5 days (Take one tablet of Nirmatelvir and 1 tablet of Ritonavir twice daily for 5 days)    Review of the result(s) of each unique test - covid19  No LOS data to display   Time spent by me doing chart review, history and exam, documentation and further activities per the note       COVID-19 positive patient.  Encounter for consideration of medication intervention. Patient does qualify for a prescription. Full discussion with patient including medication options, risks and benefits. Potential drug interactions reviewed with patient.     Treatment Planned Paxlovid at decreased dosing due to renal insufficiency, Rx sent to [place] pharmacy  [facility] in [place]  Temporary change to home medications: hold tac, oxycodone,   Estimated body mass index is 38.65 kg/m  as calculated from the following:    Height as of 6/23/23: 1.829 m (6').    Weight as of 6/23/23: 129.3 kg (285 lb).  GFR Estimate   Date Value Ref Range Status   07/31/2023 50 (L) >60 mL/min/1.73m2 Final   07/31/2023 51 (L) >60 mL/min/1.73m2 Final   06/08/2021 51 (L) >60 mL/min/[1.73_m2] Final     Comment:     Non  GFR Calc  Starting 12/18/2018, serum creatinine based estimated GFR (eGFR) will be   calculated using the Chronic Kidney Disease Epidemiology Collaboration   (CKD-EPI) equation.       Lab Results   Component Value Date    [id number] Negative 04/22/2022       No follow-ups on file.    Dora Elizabeth NP  [facility]    Subjective  Camacho is a 57 year old, presenting for the following  health issues:  No chief complaint on file.    HPI     Took two tests that were negative and now positive but with older test.  May retest  Has symptoms of head cold.  No fevers.  Started Saturday       Review of Systems   Constitutional, HEENT, cardiovascular, pulmonary, gi and gu systems are negative, except as otherwise noted.      Objective          Vitals:  No vitals were obtained today due to virtual visit.    Physical Exam     Remainder of exam unable to be completed due to telephone visits    Covid-19 test            Phone call duration: 15 minutes            Again, thank you for allowing me to participate in the care of your patient.        Sincerely,        Dora Elizabeth NP

## 2023-09-18 NOTE — TELEPHONE ENCOUNTER
Returned Camacho's call:    Camacho reports that his temp down to 100. Also states that his at home covid test was positive, but that he has realized that the tests were . His mom is getting his some new tests and he will be retaking one shortly.     Spoke with Dora Elizabeth, plan to schedule him for a video visit with her today to discuss next steps and treatment.     Order placed, updated patient who verbalized understanding.

## 2023-09-20 ENCOUNTER — TELEPHONE (OUTPATIENT)
Dept: TRANSPLANT | Facility: CLINIC | Age: 58
End: 2023-09-20

## 2023-09-20 DIAGNOSIS — Z94.4 LIVER TRANSPLANT RECIPIENT (H): Primary | ICD-10-CM

## 2023-09-20 NOTE — TELEPHONE ENCOUNTER
Prior Authorization Not Needed per Insurance    Medication: HUMALOG KWIKPEN 200 UNIT/ML SC SOPN  Insurance Company: [employer] - Phone [phone] Fax [phone]  Expected CoPay:      Pharmacy Filling the Rx: [facility] - [place], [place] - [address]   Pharmacy Notified: Yes  Patient Notified: Yes

## 2023-09-20 NOTE — TELEPHONE ENCOUNTER
Spoke with patient to inform tacrolimus level needs to be collected following last dose of Paxlovid. Per patient he will be completed with medication on 9/23. Tac level to be completed 9/24. Patient states he will go to [facility] walk in lab for lab draw, which is open 24-7. Order placed for Tacrolimus level. Patient instructed to call with any changes or concerns.

## 2023-09-21 ENCOUNTER — TELEPHONE (OUTPATIENT)
Dept: TRANSPLANT | Facility: CLINIC | Age: 58
End: 2023-09-21
Payer: COMMERCIAL

## 2023-09-21 NOTE — TELEPHONE ENCOUNTER
Camacho was tested positive for covid this past Monday. Today he has no symptoms but has not re-tested. Pt works for the [email or web address] who is stating that if pt requires a 20 day quarantine, the employer needs it in writing. Pt needs an answer for his employer as soon as possible. Do you know the answer?

## 2023-09-21 NOTE — TELEPHONE ENCOUNTER
"Patient Call: General  Route to LPN    Reason for call: Camacho  has a question about a occupational health form he is filling out, and needs some Clarification from his Coordinator  Patient has requested a call back ASA. Patient stated he tested positive for Covid on Monday 9/18/23    Call back needed? Yes    Return Call Needed  Same as documented in contacts section  When to return call?: Same day: Route High Priority  Spoke with patient regarding time off work in reference to Solid Organ Transplant and Covid-19 guidelines stating ' Patients should isolate for at least 3 weeks (20 days) from the date of their positive COVID-19 test. Patients can come out of isolation after 3 weeks (20 days), when asymptomatic. Consider re-testing as needed for ongoing symptoms or titration of Immunosuppression. \"  Gave patient SWs phone number as well as [employer] contact information as patient is an employee there. Encouraged patient to contact them and HR. Patient verbalized understanding.  Eli Bucio RN    "

## 2023-09-22 ENCOUNTER — TELEPHONE (OUTPATIENT)
Dept: TRANSPLANT | Facility: CLINIC | Age: 58
End: 2023-09-22
Payer: COMMERCIAL

## 2023-09-22 NOTE — TELEPHONE ENCOUNTER
Camacho called to finish our MyChart discussion.     Stated that he has not tested today, but wants to return after 10 days and will plan to test before he returns to work.   Per discussion with Dr. Camejo, patient can return to work at the 10 day yokasta if he is asymptomatic.     Patient also had questions re: when he should get the Covid and RSV vaccine.   Asking transplant pharmacist for guidance on appropriate Covid vaccine timing, advised patient that new guidelines on the RSV vaccine would be coming out to patients soon.    Patient verbalized understanding, no further questions at this time.

## 2023-09-25 ENCOUNTER — TELEPHONE (OUTPATIENT)
Dept: TRANSPLANT | Facility: CLINIC | Age: 58
End: 2023-09-25

## 2023-09-25 ENCOUNTER — LAB (OUTPATIENT)
Dept: LAB | Facility: CLINIC | Age: 58
End: 2023-09-25
Payer: COMMERCIAL

## 2023-09-25 DIAGNOSIS — Z94.4 LIVER REPLACED BY TRANSPLANT (H): ICD-10-CM

## 2023-09-25 LAB
ALBUMIN SERPL BCG-MCNC: 4.1 G/DL (ref 3.5–5.2)
ALP SERPL-CCNC: 153 U/L (ref 40–129)
ALT SERPL W P-5'-P-CCNC: 24 U/L (ref 0–70)
ANION GAP SERPL CALCULATED.3IONS-SCNC: 11 MMOL/L (ref 7–15)
AST SERPL W P-5'-P-CCNC: 28 U/L (ref 0–45)
BILIRUB DIRECT SERPL-MCNC: <0.2 MG/DL (ref 0–0.3)
BILIRUB SERPL-MCNC: 0.5 MG/DL
BUN SERPL-MCNC: 35.3 MG/DL (ref 6–20)
CALCIUM SERPL-MCNC: 8.6 MG/DL (ref 8.6–10)
CHLORIDE SERPL-SCNC: 107 MMOL/L (ref 98–107)
CREAT SERPL-MCNC: 1.81 MG/DL (ref 0.67–1.17)
DEPRECATED HCO3 PLAS-SCNC: 20 MMOL/L (ref 22–29)
EGFRCR SERPLBLD CKD-EPI 2021: 43 ML/MIN/1.73M2
ERYTHROCYTE [DISTWIDTH] IN BLOOD BY AUTOMATED COUNT: 13.2 % (ref 10–15)
GLUCOSE SERPL-MCNC: 128 MG/DL (ref 70–99)
HCT VFR BLD AUTO: 33 % (ref 40–53)
HGB BLD-MCNC: 10.9 G/DL (ref 13.3–17.7)
MCH RBC QN AUTO: 30 PG (ref 26.5–33)
MCHC RBC AUTO-ENTMCNC: 33 G/DL (ref 31.5–36.5)
MCV RBC AUTO: 91 FL (ref 78–100)
PLATELET # BLD AUTO: 91 10E3/UL (ref 150–450)
POTASSIUM SERPL-SCNC: 5 MMOL/L (ref 3.4–5.3)
PROT SERPL-MCNC: 6.3 G/DL (ref 6.4–8.3)
RBC # BLD AUTO: 3.63 10E6/UL (ref 4.4–5.9)
SODIUM SERPL-SCNC: 138 MMOL/L (ref 136–145)
TACROLIMUS BLD-MCNC: 1.8 UG/L (ref 5–15)
TME LAST DOSE: ABNORMAL H
TME LAST DOSE: ABNORMAL H
WBC # BLD AUTO: 6.6 10E3/UL (ref 4–11)

## 2023-09-25 PROCEDURE — 85027 COMPLETE CBC AUTOMATED: CPT

## 2023-09-25 PROCEDURE — 80197 ASSAY OF TACROLIMUS: CPT

## 2023-09-25 PROCEDURE — 80053 COMPREHEN METABOLIC PANEL: CPT

## 2023-09-25 PROCEDURE — 82248 BILIRUBIN DIRECT: CPT

## 2023-09-25 PROCEDURE — 36415 COLL VENOUS BLD VENIPUNCTURE: CPT

## 2023-09-25 NOTE — TELEPHONE ENCOUNTER
Patient Call: General    Reason for call: patient called regarding his Covid test and would like a return call Writer believe the caller stated he was negative if the RNCC can call to confirm and [facility]    Call back needed? Yes    Return Call Needed  Same as documented in contacts section  When to return call?: Same day: Route High Priority

## 2023-09-25 NOTE — TELEPHONE ENCOUNTER
Spoke to pt who tested negative for covid today and is requesting a back to work letter. Writer sent one to pt's [employer] account.

## 2023-09-25 NOTE — LETTER
Date: 2023    Re: Camacho Bhagat  :  [birth year]    To:    Whom It May Concern     Camacho has tested negative for Covid and he may return to work tomorrow.     Sincerely,    .

## 2023-09-26 ENCOUNTER — TELEPHONE (OUTPATIENT)
Dept: TRANSPLANT | Facility: CLINIC | Age: 58
End: 2023-09-26
Payer: COMMERCIAL

## 2023-10-03 ENCOUNTER — TELEPHONE (OUTPATIENT)
Dept: TRANSPLANT | Facility: CLINIC | Age: 58
End: 2023-10-03
Payer: COMMERCIAL

## 2023-10-03 NOTE — TELEPHONE ENCOUNTER
Patient sent me a TapFwd message shortly after making this phone call-    Returned message via TapFwd, advised patient on safe OTC medications for sinus issues, advised that he follow up with his PCP or schedule an urgent care visit if these issues do not resolve in the next day or two.

## 2023-10-03 NOTE — TELEPHONE ENCOUNTER
Patient Call: General  Route to LPN    Reason for call: patient called in regards of having co-vid two weeks ago. They are now having serve sinus problems. They would like to see what steps they can take moving forward.      Call back needed? Yes    Return Call Needed  Same as documented in contacts section  When to return call?: Same day: Route High Priority   Negative

## 2023-10-09 ENCOUNTER — TELEPHONE (OUTPATIENT)
Dept: TRANSPLANT | Facility: CLINIC | Age: 58
End: 2023-10-09
Payer: COMMERCIAL

## 2023-10-09 NOTE — TELEPHONE ENCOUNTER
Called patient to follow up on Tacro level post paxlovid treatment:    Patient did not answer, left a message requesting that he get a Tacro level done as soon as possible. Left a call back number.

## 2023-10-10 ENCOUNTER — LAB (OUTPATIENT)
Dept: LAB | Facility: CLINIC | Age: 58
End: 2023-10-10
Payer: COMMERCIAL

## 2023-10-10 DIAGNOSIS — Z94.4 LIVER REPLACED BY TRANSPLANT (H): ICD-10-CM

## 2023-10-10 LAB
TACROLIMUS BLD-MCNC: 3.8 UG/L (ref 5–15)
TME LAST DOSE: ABNORMAL H
TME LAST DOSE: ABNORMAL H

## 2023-10-10 PROCEDURE — 80197 ASSAY OF TACROLIMUS: CPT | Performed by: INTERNAL MEDICINE

## 2023-10-10 PROCEDURE — 99000 SPECIMEN HANDLING OFFICE-LAB: CPT | Performed by: PATHOLOGY

## 2023-10-10 PROCEDURE — 36415 COLL VENOUS BLD VENIPUNCTURE: CPT | Performed by: PATHOLOGY

## 2023-10-25 NOTE — PROGRESS NOTES
Problem:  Goal Outcome Summary  Goal: Goal Outcome Summary  Outcome:  Improving  D/I/A:   Neuro: Intact-able to follow commands.  A&O x4 but forgetful.   CV: sinus rhythm with rare PVCs; HR-60s, arterial line-normal wave form; PAs/CVP-stable-swan catheter DC'd 1430; stable BP  Pulm:  Lung sounds clear-independent strong cough-extubated 1000-RA on extubation-sats maintained @%.  GI:  OG removed w/extubation-keep NPO w/exception of pills w/sips of water. No BM. Nutrition ordered-no access-MD aware.  :  Crowe patent w/adequate output-diuresed this AM.   Skin: generalized jaundice; scleral edema/yellow. Bottom pink, non-blanchable.  Lines:   2 PIVs, left radial arterial line, 2 mac cordises R IJ  Gtts:  Fentanyl @50ml/hr; Insulin on algorithm 2 @ 2units/hr; IV maintenance @ 100ml/hr  P: continue to monitor liver function/status, hemodynamics, and consult nutrition therapy in AM about dietary status-alert MDs with any status changes.   Daughter

## 2023-11-10 NOTE — TELEPHONE ENCOUNTER
DIAGNOSIS:    APPOINTMENT DATE: 11/17/23   NOTES STATUS DETAILS   OFFICE NOTE from referring provider Internal SELF   MEDICATION LIST Internal

## 2023-11-16 NOTE — LETTER
5/30/2023      RE: Camacho Bhagat  [address] [facility] [address]     Dear Colleague,    Thank you for referring your patient, Camacho Bhagat, to the [facility] [place]. Please see a copy of my visit note below.    HISTORY OF PRESENT ILLNESS  Mr. Bhagat is a pleasant 57 year old year old male who presents to clinic today with the following:  What problem are you here for follow up for chronic bilateral low back pain.  He states he is experiencing pain, tingling and numbness that will radiate into both feet.    How long have you had this problem: chronic     Have you had any recent imaging of this problem? Xrays/MRI/CT scans: No     Have you had treatments for this problem in the past?  -Medications: patient will use oxycodone prn.   -Physical therapy: No   -Injections: lumbar DANNY administered on 2/15/2023, he states this injection was helpful for 1.5 months.  -Surgery: No     How severe is this problem today? 0-10 scale: 10/10    Does this problem or its symptoms cause difficulty for you falling asleep or staying asleep: Yes    Anything else you want us to know about this problem: Patient reports that he has bilateral foot pain/symptoms. He has a difficult time knowing if his foot pain is separate from his lumbar spine symptoms. He states he was ordered custom orthotics that are helpfully ready within the next two weeks.     MEDICAL HISTORY  Patient Active Problem List   Diagnosis    Carpal tunnel syndrome    Testicular hypofunction    Sicca syndrome (H)    Hepatocellular carcinoma (H)    Osteoarthritis    Rheumatoid arthritis (H)    Hyperkalemia    Liver transplant recipient (H)    Immunosuppressed status (H)    Neutropenic colitis (H)    S/P liver transplant (H)    Pancytopenia (H)    Ischemia of both lower extremities    Elevated serum creatinine    Abscess, intra-abdominal, postoperative    Liver transplant rejection (H)    CMV colitis (H)    Type 2 diabetes mellitus  -You are approved for your MRI with sedation  -For knee pain take Tylenol 1000 mg every 8 hours as needed  -Apply ice to the left knee 2-3 times a day as needed for no more than 10 to 15 minutes  -Apply Voltaren gel 4 times daily as needed with diabetic polyneuropathy, with long-term current use of insulin (H)    Diarrhea of infectious origin (Plesiomonas shigelloides (formerly known Aeromonas shigelloides) in 3/14/2019 sample)    Hypertension secondary to other renal disorders    Chronic kidney disease, stage 3 (H)    Morbid obesity (H)    F11.9 - Chronic, continuous use of opioids    Fibromyalgia       Current Outpatient Medications   Medication Sig Dispense Refill    alcohol swab prep pads Use to swab area of injection/francisca as directed. 100 each 3    alpha-lipoic acid 600 MG capsule Take 600 mg by mouth      amLODIPine (NORVASC) 10 MG tablet Take 1 tablet (10 mg) by mouth daily 90 tablet 3    augmented betamethasone dipropionate (DIPROLENE-AF) 0.05 % external ointment Apply twice daily as needed for rash on the legs 45 g 11    blood glucose (NO BRAND SPECIFIED) lancets standard Use to test blood sugar 1 times daily or as directed. 100 lancet 3    blood glucose (NO BRAND SPECIFIED) test strip Use to test blood sugar 1 times daily or as directed. 50 strip 11    blood glucose calibration (NO BRAND SPECIFIED) solution Use to calibrate meter. 1 Bottle 3    Blood Glucose Monitoring Suppl (CONTOUR BLOOD GLUCOSE SYSTEM) w/Device KIT For use with the Omnipod Dash insulin pump 1 kit 1    cholecalciferol (VITAMIN D3) 1000 UNIT tablet Take 1 tablet (1,000 Units) by mouth daily (Patient taking differently: Take 1,000 Units by mouth every morning) 100 tablet 3    Continuous Blood Gluc Sensor (DEXCOM G6 SENSOR) MISC Change every 10 days. 9 each 3    Continuous Blood Gluc Transmit (DEXCOM G6 TRANSMITTER) MISC Change every 3 months. 1 each 3    cyclobenzaprine (FLEXERIL) 10 MG tablet Take 1 tablet (10 mg) by mouth 3 times daily as needed for muscle spasms 90 tablet 3    doxazosin (CARDURA) 2 MG tablet Take 3 tablets (6 mg) by mouth At Bedtime 270 tablet 3    DULoxetine (CYMBALTA) 30 MG capsule Take 1 capsule (30 mg) by mouth daily 30 capsule 1    fluticasone  (FLONASE) 50 MCG/ACT nasal spray Spray 1 spray into both nostrils daily 20 mL 3    furosemide (LASIX) 40 MG tablet Take 60 mg AM and 40 mg afternoon 180 tablet 3    gabapentin (NEURONTIN) 800 MG tablet Take 1 tablet (800 mg) by mouth 3 times daily 90 tablet 11    hydrOXYzine (ATARAX) 25 MG tablet Take 1 tablet (25 mg) by mouth 3 times daily as needed for itching 90 tablet 3    Insulin Disposable Pump (OMNIPOD 5 G6 POD, GEN 5,) MISC 1 each See Admin Instructions Change every 2 days. Use for insulin administration. 45 each 3    Insulin Disposable Pump (OMNIPOD 5 G6 POD, GEN 5,) MISC 1 each See Admin Instructions Use per 's instructions. 1 each 1    Insulin Lispro (HUMALOG KWIKPEN) 200 UNIT/ML soln To be used in the insulin pump. Total daily dose up to 170 U. 72 mL 3    insulin pen needle (BD ENE U/F) 32G X 4 MM miscellaneous Use 1 pen needle 4 times daily or as directed. 400 each 1    lidocaine (LIDODERM) 5 % patch Place 1 patch onto the skin every 24 hours To prevent lidocaine toxicity, patient should be patch free for 12 hrs daily. 30 patch 11    losartan (COZAAR) 25 MG tablet Take 1 tablet (25 mg) by mouth daily 90 tablet 3    metoprolol succinate ER (TOPROL XL) 200 MG 24 hr tablet Take 1 tablet (200 mg) by mouth daily 90 tablet 3    mycophenolic acid (GENERIC EQUIVALENT) 360 MG EC tablet Take 2 tablets (720 mg) by mouth every 12 hours 360 tablet 3    oxyCODONE (ROXICODONE) 5 MG tablet Take 1 tablet (5 mg) by mouth 4 times daily as needed for pain maximum 6 tablet(s) per day 30 tablet 0    patiromer (VELTASSA) 8.4 g packet Take 8.4 g by mouth daily 90 each 3    predniSONE (DELTASONE) 2.5 MG tablet Take 1 tablet (2.5 mg) by mouth daily 90 tablet 3    sildenafil (REVATIO) 20 MG tablet Take 2 tablets (40 mg) by mouth daily as needed (erectile dysfunction) 10 tablet 11    tacrolimus (GENERIC EQUIVALENT) 1 MG capsule Take 4 capsules (4 mg) by mouth every morning AND 3 capsules (3 mg) every evening. 630  capsule 3    tretinoin (RETIN-A) 0.025 % external cream Apply a pea-sized amount evenly over the face at nighttime before bed 45 g 11    triamcinolone (KENALOG) 0.1 % external ointment Apply topically 2 times daily to the itching on the legs 80 g 1    ursodiol (ACTIGALL) 500 MG tablet Take 1 tablet (500 mg) by mouth 2 times daily 180 tablet 3       Allergies   Allergen Reactions    Erythromycin GI Disturbance    Losartan Other (See Comments)     Consistently develops hyperkalemia    Vioxx      Nausea, vomiting       Family History   Problem Relation Age of Onset    Diabetes Father     Hypertension Father     Substance Abuse Father     Arthritis Mother     Thyroid Cancer Mother         Survivor!    Cervical Cancer Maternal Grandmother     Cerebrovascular Disease Maternal Grandfather     No Known Problems Paternal Grandmother     Prostate Cancer Paternal Grandfather     Colon Cancer No family hx of     Hyperlipidemia No family hx of     Coronary Artery Disease No family hx of     Breast Cancer No family hx of      Social History     Socioeconomic History    Marital status:     Number of children: 1   Occupational History    Occupation: [employer]    Tobacco Use    Smoking status: Former     Packs/day: 0.25     Years: 2.00     Pack years: 0.50     Types: Cigarettes     Quit date:      Years since quittin.4    Smokeless tobacco: Former     Types: Chew     Quit date: 10/8/2015    Tobacco comments:     1 Cigar once every other month.   Substance and Sexual Activity    Alcohol use: No     Alcohol/week: 0.0 standard drinks of alcohol     Comment: No alcohol since liver transplant.      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           Additional medical/Social/Surgical histories reviewed in [facility] and updated as appropriate.     REVIEW OF SYSTEMS (2023)  10 point ROS of systems including  "Constitutional, Eyes, Respiratory, Cardiovascular, Gastroenterology, Genitourinary, Integumentary, Musculoskeletal, Psychiatric, Allergic/Immunologic were all negative except for pertinent positives noted in my HPI.     PHYSICAL EXAM  VSS  Vital Signs: Ht 1.829 m (6' 0.01\")   Wt 132.9 kg (293 lb)   BMI 39.73 kg/m   Patient declined being weighed. Body mass index is 39.73 kg/m .    General  - normal appearance, in no obvious distress  HEENT  - conjunctivae not injected, moist mucous membranes, normocephalic/atraumatic head, ears normal appearance, no lesions, mouth normal appearance, no scars, normal dentition and teeth present  CV  - normal peripheral perfusion  Pulm  - normal respiratory pattern, non-labored  Musculoskeletal - lumbar spine  - stance: normal gait without limp, no obvious leg length discrepancy, normal heel and toe walk  - inspection: normal bone and joint alignment, no obvious scoliosis  - palpation: no paravertebral or bony tenderness  - ROM: flexion exacerbates pain, normal extension, sidebending, rotation  - strength: lower extremities 5/5 in all planes  - special tests:  (+) straight leg raise  (+) slump test  Neuro  - patellar and Achilles DTRs 2+ bilaterally, lower extremity sensory deficit throughout L5 distribution, grossly normal coordination, normal muscle tone  Skin  - no ecchymosis, erythema, warmth, or induration, no obvious rash  Psych  - interactive, appropriate, normal mood and affect    ASSESSMENT & PLAN  58 yo male with lumbar ddd, disc herniations, radicular pain, worse    I independently reviewed the following imaging studies:  Lumbar MRI: shows ddd, disc herniations  Discussed and ordered tan  Cont. HEP and PT exercises  Follow-up/ after TAN  Patient has been doing home exercise physical therapy program for this problem      Appropriate PPE was utilized for prevention of spread of Covid-19.  Ronaldo Doty MD, CAQSM        "

## 2023-11-17 ENCOUNTER — PRE VISIT (OUTPATIENT)
Dept: ORTHOPEDICS | Facility: CLINIC | Age: 58
End: 2023-11-17

## 2023-11-17 ENCOUNTER — OFFICE VISIT (OUTPATIENT)
Dept: ORTHOPEDICS | Facility: CLINIC | Age: 58
End: 2023-11-17
Payer: COMMERCIAL

## 2023-11-17 DIAGNOSIS — E11.49 TYPE II OR UNSPECIFIED TYPE DIABETES MELLITUS WITH NEUROLOGICAL MANIFESTATIONS, NOT STATED AS UNCONTROLLED(250.60) (H): ICD-10-CM

## 2023-11-17 DIAGNOSIS — Q66.6 CONGENITAL REARFOOT VALGUS: Primary | ICD-10-CM

## 2023-11-17 PROCEDURE — 99203 OFFICE O/P NEW LOW 30 MIN: CPT | Performed by: PODIATRIST

## 2023-11-17 RX ORDER — LIDOCAINE 50 MG/G
OINTMENT TOPICAL PRN
Qty: 90 G | Refills: 11 | Status: SHIPPED | OUTPATIENT
Start: 2023-11-17

## 2023-11-17 NOTE — LETTER
11/17/2023         RE: Camacho Bhagat  [address]  [facility] [address]        Dear Colleague,    Thank you for referring your patient, Camacho Bhagat, to the [facility] [place]. Please see a copy of my visit note below.    Date of Service: 11/17/2023    Chief Complaint:   Chief Complaint   Patient presents with    Consult     Foot pain        HPI: Camacho is a 58 year old male who presents today for a diabetic foot eval and management. He has been diabetic for years. Has liver transplant. Notes that he is having pain in the outside of both feet. Has diabetic shoes and inserts.     Review of Systems: No n/v/d/f/c/ns/sob/cp    PMH:   Past Medical History:   Diagnosis Date    Diabetes type 2, controlled (H) 11/10/2016    Esophageal reflux     Fibromyalgia 01/2009    dx with Dr Benitez( Rheum)    Gangrene of finger (H) 08/25/2017    H/O deep venous thrombosis 11/2001    Permanent IVC filter in place.    H/O CASTAÑEDA (nonalcoholic steatohepatitis)     H/O Pneumonia, organism unspecified(486) 10/2001    Included ARDS, sepsis, and  acute renal failure; hospitalized, required tracheostomy placement.    H/O: HTN (hypertension) 11/2001    No longer prescribed antihypertensive medication.      History of hepatocellular carcinoma     History of liver transplant (H)     History of obstructive sleep apnea     No longer wears CPAP since losing approximately 200 pounds with his liver transplant and its complications.      HLD (hyperlipidemia)     Ischemia of both lower extremities 08/25/2017    Distal ischemia due to shock/high pressor requirements    Liver transplant rejection (H) 06/11/2018    Neutropenic colitis (H) 07/04/2017    Osteoarthritis     Presence of PERMANENT IVC filter     Rheumatoid arthritis(714.0)     remission for many years       PSxH:   Past Surgical History:   Procedure Laterality Date    ARTHROSCOPY KNEE WITH MENISCAL REPAIR Right 2008    Right knee arthroscopy    BENCH  LIVER N/A 03/04/2017    Procedure: BENCH LIVER;  Surgeon: Jovan Tran MD;  Location: [facility]    CHOLECYSTECTOMY      COLONOSCOPY N/A 07/21/2017    Procedure: COMBINED COLONOSCOPY, SINGLE OR MULTIPLE BIOPSY/POLYPECTOMY BY BIOPSY;  Colonoscopy;  Surgeon: Izaiah Montes MD;  Location: [facility] GI    COLONOSCOPY N/A 10/24/2022    Procedure: COLONOSCOPY, WITH POLYPECTOMY AND BIOPSY;  Surgeon: Abril Mcneill MD;  Location: [facility]    ENDOSCOPIC RETROGRADE CHOLANGIOPANCREATOGRAM N/A 05/22/2018    Procedure: COMBINED ENDOSCOPIC RETROGRADE CHOLANGIOPANCREATOGRAPHY, PLACE TUBE/STENT;  Endoscopic Retrograde Cholangiopancreatography with sphincterotomy and pancreatic duct stent placement;  Surgeon: Zay Gaitan MD;  Location: [facility] OR    ENT SURGERY      Repair of deviated septum    ESOPHAGOSCOPY, GASTROSCOPY, DUODENOSCOPY (EGD), COMBINED N/A 08/04/2016    Procedure: COMBINED ESOPHAGOSCOPY, GASTROSCOPY, DUODENOSCOPY (EGD), BIOPSY SINGLE OR MULTIPLE;  Surgeon: Trent Pederson MD;  Location:  GI    ESOPHAGOSCOPY, GASTROSCOPY, DUODENOSCOPY (EGD), COMBINED N/A 08/27/2017    Procedure: COMBINED ESOPHAGOSCOPY, GASTROSCOPY, DUODENOSCOPY (EGD);;  Surgeon: Los Wynn MD;  [facility]    ESOPHAGOSCOPY, GASTROSCOPY, DUODENOSCOPY (EGD), COMBINED N/A 8/17/2023    Procedure: Esophagoscopy, gastroscopy, duodenoscopy (EGD), combined;  Surgeon: Trent Villanueva MD;  Location: [facility]    INCISION AND DRAINAGE ABDOMEN WASHOUT, COMBINED Right 02/14/2018    Procedure: COMBINED INCISION AND DRAINAGE ABDOMEN WASHOUT;  COMBINED INCISION AND DRAINAGE right ABDOMEN flank;  Surgeon: Sara Dinh MD;  [facility]    LAPAROTOMY EXPLORATORY N/A 07/04/2017    Procedure: LAPAROTOMY EXPLORATORY;  Exploratory Laparotomy, washout;  Surgeon: Tip Zhang MD;  [facility]    LAPAROTOMY EXPLORATORY N/A 07/05/2017    Procedure: LAPAROTOMY EXPLORATORY;  Exploratory Laparotomy, Washout with closure.;   Surgeon: Tip Zhang MD;  [facility]    LAPAROTOMY EXPLORATORY N/A 2017    Procedure: LAPAROTOMY EXPLORATORY;  Exploratory Laparotomy, Right angelique-colectomy, end ileostomy, mucosal fistula, partial omentectomy;  Surgeon: Sara Dinh MD;  [facility]    ORTHOPEDIC SURGERY Right     Repair of dislocated wrist.      RELEASE TRIGGER FINGER Right 11/10/2017    Procedure: RELEASE TRIGGER FINGER;  Debride, V-Y Flap Right Index Finger;  Surgeon: Momo Noonan MD;  [facility]    TAKEDOWN ILEOSTOMY N/A 2018    Procedure: TAKEDOWN ILEOSTOMY;  Exploratory Laparotomy, Lysis of Adhesions, Takedown Of End Ileostomy, Takedown of mucocutaneous fistula, ileocolic resection  And Colorectal Anastomosis, Primary repair of Ventral Hernia, Anesthesia Block ;  Surgeon: Sara Dinh MD;  [facility]: [facility]    TRACHEOSTOMY  2001    During hospitalization for pneumonia.      TRANSPLANT LIVER RECIPIENT  DONOR N/A 2017    Procedure: TRANSPLANT LIVER RECIPIENT  DONOR;  Surgeon: Jovan Tran MD;  [facility]       Allergies: Erythromycin, Losartan, and Vioxx    SH:   Social History     Socioeconomic History    Marital status:      Spouse name: Not on file    Number of children: 1    Years of education: Not on file    Highest education level: Not on file   Occupational History    Occupation:     Tobacco Use    Smoking status: Former     Packs/day: 0.25     Years: 2.00     Additional pack years: 0.00     Total pack years: 0.50     Types: Cigarettes     Quit date: 1985     Years since quittin.9    Smokeless tobacco: Former     Types: Chew     Quit date: 10/8/2015    Tobacco comments:     1 Cigar once every other month.   Substance and Sexual Activity    Alcohol use: No     Alcohol/week: 0.0 standard drinks of alcohol     Comment: No alcohol since liver transplant.      Drug use: No    Sexual activity: Not Currently      Partners: Female   Other Topics Concern    Parent/sibling w/ CABG, MI or angioplasty before 65F 55M? 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         Social Determinants of Health     Financial Resource Strain: Not on file   Food Insecurity: Not on file   Transportation Needs: Not on file   Physical Activity: Not on file   Stress: Not on file   Social Connections: Not on file   Interpersonal Safety: Not on file   Housing Stability: Not on file       FH:   Family History   Problem Relation Age of Onset    Diabetes Father     Hypertension Father     Substance Abuse Father     Arthritis Mother     Thyroid Cancer Mother         Survivor!    Cervical Cancer Maternal Grandmother     Cerebrovascular Disease Maternal Grandfather     No Known Problems Paternal Grandmother     Prostate Cancer Paternal Grandfather     Colon Cancer No family hx of     Hyperlipidemia No family hx of     Coronary Artery Disease No family hx of     Breast Cancer No family hx of        Objective:  Data Unavailable Data Unavailable Data Unavailable Data Unavailable Data Unavailable 0 lbs 0 oz    PT and DP pulses are 2/4 bilaterally. CRT is instant. Diminished pedal hair.   Gross sensation is diminished bilaterally. Protective sensation is diminished as demonstrated with a 5.07G monofilament.   Equinus is noted bilaterally. No pain with active or passive ROM of the ankle, MTJ, 1st ray, or halluces bilaterally,. When standing on the inserts, he is being rolled outward, onto the 5th met heads.   Nails very short bilaterally. No open lesions are noted.     Assessment:   Encounter Diagnoses   Name Primary?    Congenital rearfoot valgus Yes    Type II or unspecified type diabetes mellitus with neurological manifestations, not stated as uncontrolled(250.60) (H)          Plan:  - Pt seen and evaluated  - Orders were written to adjust the orthotics and for a new set.   - He should not trim his nails 2/2  neuropathy.  - Moisturize daily.  - See again in 3 months.            Leo Joshi DPM

## 2023-11-17 NOTE — PROGRESS NOTES
Date of Service: 11/17/2023    Chief Complaint:   Chief Complaint   Patient presents with    Consult     Foot pain        HPI: Camacho is a 58 year old male who presents today for a diabetic foot eval and management. He has been diabetic for years. Has liver transplant. Notes that he is having pain in the outside of both feet. Has diabetic shoes and inserts.     Review of Systems: No n/v/d/f/c/ns/sob/cp    PMH:   Past Medical History:   Diagnosis Date    Diabetes type 2, controlled (H) 11/10/2016    Esophageal reflux     Fibromyalgia 01/2009    dx with Dr Benitez( Rhe[facility])    Gangrene of finger (H) 08/25/2017    H/O deep venous thrombosis 11/2001    Permanent IVC filter in place.    H/O CASTAÑEDA (nonalcoholic steatohepatitis)     H/O Pneumonia, organism unspecified(486) 10/2001    Included ARDS, sepsis, and  acute renal failure; hospitalized, required tracheostomy placement.    H/O: HTN (hypertension) 11/2001    No longer prescribed antihypertensive medication.      History of hepatocellular carcinoma     History of liver transplant (H)     History of obstructive sleep apnea     No longer wears CPAP since losing approximately 200 pounds with his liver transplant and its complications.      HLD (hyperlipidemia)     Ischemia of both lower extremities 08/25/2017    Distal ischemia due to shock/high pressor requirements    Liver transplant rejection (H) 06/11/2018    Neutropenic colitis (H) 07/04/2017    Osteoarthritis     Presence of PERMANENT IVC filter     Rheumatoid arthritis(714.0)     remission for many years       PSxH:   Past Surgical History:   Procedure Laterality Date    ARTHROSCOPY KNEE WITH MENISCAL REPAIR Right 2008    Right knee arthroscopy    BENCH LIVER N/A 03/04/2017    Procedure: BENCH LIVER;  Surgeon: Jovan Tran MD;  Location: [facility] OR    CHOLECYSTECTOMY      COLONOSCOPY N/A 07/21/2017    Procedure: COMBINED COLONOSCOPY, SINGLE OR MULTIPLE BIOPSY/POLYPECTOMY BY BIOPSY;  Colonoscopy;  Surgeon: Simon  Izaiah Griffin MD;  Location:  GI    COLONOSCOPY N/A 10/24/2022    Procedure: COLONOSCOPY, WITH POLYPECTOMY AND BIOPSY;  Surgeon: Abril Mcneill MD;  Location:  GI    ENDOSCOPIC RETROGRADE CHOLANGIOPANCREATOGRAM N/A 05/22/2018    Procedure: COMBINED ENDOSCOPIC RETROGRADE CHOLANGIOPANCREATOGRAPHY, PLACE TUBE/STENT;  Endoscopic Retrograde Cholangiopancreatography with sphincterotomy and pancreatic duct stent placement;  Surgeon: Zay Gaitan MD;  Location:  OR    ENT SURGERY      Repair of deviated septum    ESOPHAGOSCOPY, GASTROSCOPY, DUODENOSCOPY (EGD), COMBINED N/A 08/04/2016    Procedure: COMBINED ESOPHAGOSCOPY, GASTROSCOPY, DUODENOSCOPY (EGD), BIOPSY SINGLE OR MULTIPLE;  Surgeon: Trent Pederson MD;  Location:  GI    ESOPHAGOSCOPY, GASTROSCOPY, DUODENOSCOPY (EGD), COMBINED N/A 08/27/2017    Procedure: COMBINED ESOPHAGOSCOPY, GASTROSCOPY, DUODENOSCOPY (EGD);;  Surgeon: Los Wynn MD;  Location:  GI    ESOPHAGOSCOPY, GASTROSCOPY, DUODENOSCOPY (EGD), COMBINED N/A 8/17/2023    Procedure: Esophagoscopy, gastroscopy, duodenoscopy (EGD), combined;  Surgeon: Trent Villanueva MD;  Location:  GI    INCISION AND DRAINAGE ABDOMEN WASHOUT, COMBINED Right 02/14/2018    Procedure: COMBINED INCISION AND DRAINAGE ABDOMEN WASHOUT;  COMBINED INCISION AND DRAINAGE right ABDOMEN flank;  Surgeon: Sara Dinh MD;  Location: [facility]    LAPAROTOMY EXPLORATORY N/A 07/04/2017    Procedure: LAPAROTOMY EXPLORATORY;  Exploratory Laparotomy, washout;  Surgeon: Tip Zhang MD;  Location: [facility]    LAPAROTOMY EXPLORATORY N/A 07/05/2017    Procedure: LAPAROTOMY EXPLORATORY;  Exploratory Laparotomy, Washout with closure.;  Surgeon: Tip Zhang MD;  Location: [facility]    LAPAROTOMY EXPLORATORY N/A 07/26/2017    Procedure: LAPAROTOMY EXPLORATORY;  Exploratory Laparotomy, Right angelique-colectomy, end ileostomy, mucosal fistula, partial omentectomy;  Surgeon: Sara Dinh MD;   Location: [facility]    ORTHOPEDIC SURGERY Right     Repair of dislocated wrist.      RELEASE TRIGGER FINGER Right 11/10/2017    Procedure: RELEASE TRIGGER FINGER;  Debride, V-Y Flap Right Index Finger;  Surgeon: Momo Noonan MD;  [facility]    TAKEDOWN ILEOSTOMY N/A 2018    Procedure: TAKEDOWN ILEOSTOMY;  Exploratory Laparotomy, Lysis of Adhesions, Takedown Of End Ileostomy, Takedown of mucocutaneous fistula, ileocolic resection  And Colorectal Anastomosis, Primary repair of Ventral Hernia, Anesthesia Block ;  Surgeon: Sara Dinh MD;  Location: [facility]    TRACHEOSTOMY  2001    During hospitalization for pneumonia.      TRANSPLANT LIVER RECIPIENT  DONOR N/A 2017    Procedure: TRANSPLANT LIVER RECIPIENT  DONOR;  Surgeon: Jovan Tran MD;  Location: [facility] OR       Allergies: Erythromycin, Losartan, and Vioxx    SH:   Social History     Socioeconomic History    Marital status:      Spouse name: Not on file    Number of children: 1    Years of education: Not on file    Highest education level: Not on file   Occupational History    Occupation:     Tobacco Use    Smoking status: Former     Packs/day: 0.25     Years: 2.00     Additional pack years: 0.00     Total pack years: 0.50     Types: Cigarettes     Quit date:      Years since quittin.9    Smokeless tobacco: Former     Types: Chew     Quit date: 10/8/2015    Tobacco comments:     1 Cigar once every other month.   Substance and Sexual Activity    Alcohol use: No     Alcohol/week: 0.0 standard drinks of alcohol     Comment: No alcohol since liver transplant.      Drug use: No    Sexual activity: Not Currently     Partners: Female   Other Topics Concern    Parent/sibling w/ CABG, MI or angioplasty before 65F 55M? 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         Social Determinants of Health     Financial Resource Strain: Not on  file   Food Insecurity: Not on file   Transportation Needs: Not on file   Physical Activity: Not on file   Stress: Not on file   Social Connections: Not on file   Interpersonal Safety: Not on file   Housing Stability: Not on file       FH:   Family History   Problem Relation Age of Onset    Diabetes Father     Hypertension Father     Substance Abuse Father     Arthritis Mother     Thyroid Cancer Mother         Survivor!    Cervical Cancer Maternal Grandmother     Cerebrovascular Disease Maternal Grandfather     No Known Problems Paternal Grandmother     Prostate Cancer Paternal Grandfather     Colon Cancer No family hx of     Hyperlipidemia No family hx of     Coronary Artery Disease No family hx of     Breast Cancer No family hx of        Objective:  Data Unavailable Data Unavailable Data Unavailable Data Unavailable Data Unavailable 0 lbs 0 oz    PT and DP pulses are 2/4 bilaterally. CRT is instant. Diminished pedal hair.   Gross sensation is diminished bilaterally. Protective sensation is diminished as demonstrated with a 5.07G monofilament.   Equinus is noted bilaterally. No pain with active or passive ROM of the ankle, MTJ, 1st ray, or halluces bilaterally,. When standing on the inserts, he is being rolled outward, onto the 5th met heads.   Nails very short bilaterally. No open lesions are noted.     Assessment:   Encounter Diagnoses   Name Primary?    Congenital rearfoot valgus Yes    Type II or unspecified type diabetes mellitus with neurological manifestations, not stated as uncontrolled(250.60) (H)          Plan:  - Pt seen and evaluated  - Orders were written to adjust the orthotics and for a new set.   - He should not trim his nails 2/2 neuropathy.  - Moisturize daily.  - See again in 3 months.

## 2023-11-21 ENCOUNTER — TELEPHONE (OUTPATIENT)
Dept: GASTROENTEROLOGY | Facility: CLINIC | Age: 58
End: 2023-11-21
Payer: COMMERCIAL

## 2023-11-30 ENCOUNTER — LAB (OUTPATIENT)
Dept: LAB | Facility: CLINIC | Age: 58
End: 2023-11-30
Payer: COMMERCIAL

## 2023-11-30 DIAGNOSIS — E11.42 TYPE 2 DIABETES MELLITUS WITH DIABETIC POLYNEUROPATHY, WITH LONG-TERM CURRENT USE OF INSULIN (H): ICD-10-CM

## 2023-11-30 DIAGNOSIS — N18.31 STAGE 3A CHRONIC KIDNEY DISEASE (H): ICD-10-CM

## 2023-11-30 DIAGNOSIS — Z79.4 TYPE 2 DIABETES MELLITUS WITH DIABETIC POLYNEUROPATHY, WITH LONG-TERM CURRENT USE OF INSULIN (H): ICD-10-CM

## 2023-11-30 DIAGNOSIS — K52.9 CHRONIC DIARRHEA OF UNKNOWN ORIGIN: ICD-10-CM

## 2023-11-30 DIAGNOSIS — Z94.4 LIVER REPLACED BY TRANSPLANT (H): ICD-10-CM

## 2023-11-30 LAB
ALBUMIN SERPL BCG-MCNC: 4 G/DL (ref 3.5–5.2)
ALP SERPL-CCNC: 164 U/L (ref 40–150)
ALT SERPL W P-5'-P-CCNC: 23 U/L (ref 0–70)
ANION GAP SERPL CALCULATED.3IONS-SCNC: 11 MMOL/L (ref 7–15)
AST SERPL W P-5'-P-CCNC: 25 U/L (ref 0–45)
BILIRUB DIRECT SERPL-MCNC: <0.2 MG/DL (ref 0–0.3)
BILIRUB SERPL-MCNC: 0.4 MG/DL
BUN SERPL-MCNC: 47.1 MG/DL (ref 8–23)
CALCIUM SERPL-MCNC: 8.8 MG/DL (ref 8.6–10)
CHLORIDE SERPL-SCNC: 104 MMOL/L (ref 98–107)
CREAT SERPL-MCNC: 2.09 MG/DL (ref 0.67–1.17)
DEPRECATED HCO3 PLAS-SCNC: 21 MMOL/L (ref 22–29)
EGFRCR SERPLBLD CKD-EPI 2021: 36 ML/MIN/1.73M2
ERYTHROCYTE [DISTWIDTH] IN BLOOD BY AUTOMATED COUNT: 13.9 % (ref 10–15)
GLUCOSE SERPL-MCNC: 175 MG/DL (ref 70–99)
HBA1C MFR BLD: 6.2 %
HCT VFR BLD AUTO: 30.3 % (ref 40–53)
HGB BLD-MCNC: 10.3 G/DL (ref 13.3–17.7)
MCH RBC QN AUTO: 30.5 PG (ref 26.5–33)
MCHC RBC AUTO-ENTMCNC: 34 G/DL (ref 31.5–36.5)
MCV RBC AUTO: 90 FL (ref 78–100)
PHOSPHATE SERPL-MCNC: 4.1 MG/DL (ref 2.5–4.5)
PLATELET # BLD AUTO: 121 10E3/UL (ref 150–450)
POTASSIUM SERPL-SCNC: 4.8 MMOL/L (ref 3.4–5.3)
PROT SERPL-MCNC: 6.7 G/DL (ref 6.4–8.3)
RBC # BLD AUTO: 3.38 10E6/UL (ref 4.4–5.9)
SODIUM SERPL-SCNC: 136 MMOL/L (ref 135–145)
TACROLIMUS BLD-MCNC: 3.8 UG/L (ref 5–15)
TME LAST DOSE: ABNORMAL H
TME LAST DOSE: ABNORMAL H
WBC # BLD AUTO: 6.1 10E3/UL (ref 4–11)

## 2023-11-30 PROCEDURE — 99000 SPECIMEN HANDLING OFFICE-LAB: CPT | Performed by: PATHOLOGY

## 2023-11-30 PROCEDURE — 80197 ASSAY OF TACROLIMUS: CPT | Performed by: INTERNAL MEDICINE

## 2023-11-30 PROCEDURE — 80053 COMPREHEN METABOLIC PANEL: CPT | Performed by: PATHOLOGY

## 2023-11-30 PROCEDURE — 85027 COMPLETE CBC AUTOMATED: CPT | Performed by: PATHOLOGY

## 2023-11-30 PROCEDURE — 36415 COLL VENOUS BLD VENIPUNCTURE: CPT | Performed by: PATHOLOGY

## 2023-11-30 PROCEDURE — 86258 DGP ANTIBODY EACH IG CLASS: CPT | Performed by: PHYSICIAN ASSISTANT

## 2023-11-30 PROCEDURE — 84100 ASSAY OF PHOSPHORUS: CPT | Performed by: PATHOLOGY

## 2023-11-30 PROCEDURE — 82248 BILIRUBIN DIRECT: CPT | Performed by: PATHOLOGY

## 2023-11-30 PROCEDURE — 83036 HEMOGLOBIN GLYCOSYLATED A1C: CPT | Performed by: INTERNAL MEDICINE

## 2023-12-01 ENCOUNTER — TELEPHONE (OUTPATIENT)
Dept: TRANSPLANT | Facility: CLINIC | Age: 58
End: 2023-12-01
Payer: COMMERCIAL

## 2023-12-01 DIAGNOSIS — N18.31 STAGE 3A CHRONIC KIDNEY DISEASE (H): Primary | ICD-10-CM

## 2023-12-01 LAB
GLIADIN IGA SER-ACNC: 0.3 U/ML
GLIADIN IGG SER-ACNC: <0.6 U/ML

## 2023-12-08 ENCOUNTER — TELEPHONE (OUTPATIENT)
Dept: OPHTHALMOLOGY | Facility: CLINIC | Age: 58
End: 2023-12-08
Payer: COMMERCIAL

## 2023-12-08 NOTE — TELEPHONE ENCOUNTER
Home/cell [phone]    Spoke to patient at 1640    Pt recently seen by eye provider end of November and will be seeing Dr. Roque for cataract surgery    Recently in past couple days foreign body sensation and blurrier vision in each eye.    Recommended increase use of preservative free tears, warm compresses and lubricating eye ointment.    If not improved by early next week to see regular eye provider or contact [facility] for exam.    Pt seemed comfortable with information plan.    Alex Toney RN 6:14 PM 12/08/23

## 2023-12-08 NOTE — TELEPHONE ENCOUNTER
Health Call Center    Phone Message    May a detailed message be left on voicemail: yes     Reason for Call: Other: Patient is asking for options regarding his blurry vision leading up to his appointment on 12/27 with Dr. Roque.  Patient requests a call back.  Thank you!      Action Taken: Message routed to:  [facility] ([facility] Eye     Travel Screening: Not Applicable

## 2023-12-08 NOTE — TELEPHONE ENCOUNTER
LVM for patient regarding rescheduling for sooner appointment from the wait-list. Provided Eye Clinic number for scheduling as appointment remains available.

## 2023-12-18 ENCOUNTER — PATIENT OUTREACH (OUTPATIENT)
Dept: GASTROENTEROLOGY | Facility: CLINIC | Age: 58
End: 2023-12-18
Payer: COMMERCIAL

## 2023-12-18 DIAGNOSIS — Z94.4 LIVER REPLACED BY TRANSPLANT (H): Primary | ICD-10-CM

## 2023-12-18 DIAGNOSIS — Z13.220 LIPID SCREENING: ICD-10-CM

## 2023-12-18 NOTE — PROGRESS NOTES
Per MRE on 5/4/23:   Trent Villanueva MD  5/22/2023  1:13 PM CDT Back to Top      [facility],  The MRI showed some mild rectal thickening. We should evaluate this at your colonoscopy.  There is some herniated small bowel.  It is not obstructed.  The bowel looks ok.    You have the other findings of a new pancreas cyst for which they recommended an MRI of the pancrease.  I would wait at least 6 months.  The other findings in the ureters and spleen appear chronic and not likely any concern at this point.    Dr Villanueva   -----------------------------------  Dr. Villanueva,  Please advice if you would like MRE repeated.    Thank you.  Sharri

## 2023-12-26 DIAGNOSIS — H25.9 AGE RELATED CATARACT: Primary | ICD-10-CM

## 2023-12-27 ENCOUNTER — TELEPHONE (OUTPATIENT)
Dept: OPHTHALMOLOGY | Facility: CLINIC | Age: 58
End: 2023-12-27

## 2023-12-27 ENCOUNTER — OFFICE VISIT (OUTPATIENT)
Dept: OPHTHALMOLOGY | Facility: CLINIC | Age: 58
End: 2023-12-27
Attending: OPHTHALMOLOGY
Payer: COMMERCIAL

## 2023-12-27 DIAGNOSIS — H52.4 HYPEROPIA OF BOTH EYES WITH ASTIGMATISM AND PRESBYOPIA: ICD-10-CM

## 2023-12-27 DIAGNOSIS — H25.813 COMBINED FORMS OF AGE-RELATED CATARACT OF BOTH EYES: Primary | ICD-10-CM

## 2023-12-27 DIAGNOSIS — Z98.890 H/O LASER ASSISTED IN SITU KERATOMILEUSIS: ICD-10-CM

## 2023-12-27 DIAGNOSIS — E11.3213 TYPE 2 DIABETES MELLITUS WITH BOTH EYES AFFECTED BY MILD NONPROLIFERATIVE RETINOPATHY AND MACULAR EDEMA, WITH LONG-TERM CURRENT USE OF INSULIN (H): ICD-10-CM

## 2023-12-27 DIAGNOSIS — Z79.4 TYPE 2 DIABETES MELLITUS WITH BOTH EYES AFFECTED BY MILD NONPROLIFERATIVE RETINOPATHY AND MACULAR EDEMA, WITH LONG-TERM CURRENT USE OF INSULIN (H): ICD-10-CM

## 2023-12-27 DIAGNOSIS — H52.03 HYPEROPIA OF BOTH EYES WITH ASTIGMATISM AND PRESBYOPIA: ICD-10-CM

## 2023-12-27 DIAGNOSIS — H25.9 AGE RELATED CATARACT: ICD-10-CM

## 2023-12-27 DIAGNOSIS — H52.203 HYPEROPIA OF BOTH EYES WITH ASTIGMATISM AND PRESBYOPIA: ICD-10-CM

## 2023-12-27 PROCEDURE — 99214 OFFICE O/P EST MOD 30 MIN: CPT | Performed by: OPHTHALMOLOGY

## 2023-12-27 PROCEDURE — 99204 OFFICE O/P NEW MOD 45 MIN: CPT | Performed by: OPHTHALMOLOGY

## 2023-12-27 PROCEDURE — 76519 ECHO EXAM OF EYE: CPT | Performed by: OPHTHALMOLOGY

## 2023-12-27 PROCEDURE — 92134 CPTRZ OPH DX IMG PST SGM RTA: CPT | Performed by: OPHTHALMOLOGY

## 2023-12-27 ASSESSMENT — CONF VISUAL FIELD
OS_INFERIOR_NASAL_RESTRICTION: 0
OD_NORMAL: 1
OS_INFERIOR_TEMPORAL_RESTRICTION: 0
METHOD: COUNTING FINGERS
OD_INFERIOR_NASAL_RESTRICTION: 0
OD_SUPERIOR_NASAL_RESTRICTION: 0
OS_NORMAL: 1
OS_SUPERIOR_TEMPORAL_RESTRICTION: 0
OD_INFERIOR_TEMPORAL_RESTRICTION: 0
OS_SUPERIOR_NASAL_RESTRICTION: 0
OD_SUPERIOR_TEMPORAL_RESTRICTION: 0

## 2023-12-27 ASSESSMENT — REFRACTION_MANIFEST
OS_CYLINDER: +0.75
OS_AXIS: 154
OS_SPHERE: +0.75
OD_SPHERE: PLANO
OD_CYLINDER: SPHERE

## 2023-12-27 ASSESSMENT — VISUAL ACUITY
OS_BAT_HIGH: 20/50
OS_BAT_LOW: 20/25-
OD_BAT_HIGH: LP
OS_BAT_MED: 20/30
OS_CC: 20/30
CORRECTION_TYPE: GLASSES
OS_CC+: -2
OD_BAT_LOW: 20/125
METHOD: SNELLEN - LINEAR
OD_BAT_MED: 20/400
OD_CC: 20/60
METHOD_MR: DIAGNOSTIC
OD_PH_CC: 20/50

## 2023-12-27 ASSESSMENT — REFRACTION_WEARINGRX
OS_AXIS: 165
OD_SPHERE: -0.50
OD_ADD: 1.61
SPECS_TYPE: PAL
OD_AXIS: 016
OD_CYLINDER: +1.00
OS_CYLINDER: +0.50
OS_SPHERE: +1.00
OS_ADD: +1.60

## 2023-12-27 ASSESSMENT — SLIT LAMP EXAM - LIDS
COMMENTS: NORMAL
COMMENTS: NORMAL

## 2023-12-27 ASSESSMENT — TONOMETRY
OS_IOP_MMHG: 12
OD_IOP_MMHG: 11
IOP_METHOD: TONOPEN

## 2023-12-27 ASSESSMENT — CUP TO DISC RATIO
OD_RATIO: 0.1
OS_RATIO: 0.1

## 2023-12-27 NOTE — NURSING NOTE
Chief Complaints and History of Present Illnesses   Patient presents with    Consult For     Cataract evaluation     Chief Complaint(s) and History of Present Illness(es)       Consult For              Laterality: both eyes    Onset: 10 months ago    Context: night driving    Course: gradually worsening    Associated symptoms: glare, eye pain (right eye worse than the left), photophobia and foreign body sensation    Treatments tried: artificial tears    Pain scale: 4/10    Comments: Cataract evaluation              Comments    He states that his vision has been decreasing in each eye, worse in his right eye for the past 10 months.  His vision has been getting progressively blurred.  He has been getting eye irritation and foreign body sensations in each eye.  He had LASIK about 20 years ago.     Lab Results       Component                Value               Date                       A1C                      6.2                 11/30/2023                 A1C                      5.7                 07/31/2023                 A1C                      6.0                 09/27/2022                 A1C                      5.2                 03/03/2022                 A1C                      7.1                 08/18/2021                 A1C                      6.3                 03/31/2021                 A1C                      6.7                 02/12/2021                 A1C                      6.4                 11/24/2020                Estefani Munoz, COT 8:51 AM  December 27, 2023

## 2023-12-27 NOTE — TELEPHONE ENCOUNTER
Called and spoke with the patient to schedule surgery. Surgery scheduled for 2/1 at [facility] with Dr. Roque.    H&P with PCP within 30 days of the surgery date. Patient verbalized understanding H&P is needed prior to surgery.    Patient will have a same day post-op, 1 week, and 4 week post-op.    Writer will mail surgery packet.    No further questions/concerns at this time.    Janice Quintero on 12/27/2023 at 1:39 PM

## 2023-12-27 NOTE — PROGRESS NOTES
HPI       Consult For    In both eyes.  This started 10 months ago.  Context:  night driving.  Since onset it is gradually worsening.  Associated symptoms include glare, eye pain (right eye worse than the left), photophobia and foreign body sensation.  Treatments tried include artificial tears.  Pain was noted as 4/10. Additional comments: Cataract evaluation             Comments    He states that his vision has been decreasing in each eye, worse in his right eye for the past 10 months.  His vision has been getting progressively blurred.  He has been getting eye irritation and foreign body sensations in each eye.  He had LASIK about 20 years ago.     Lab Results       Component                Value               Date                       A1C                      6.2                 11/30/2023                 A1C                      5.7                 07/31/2023                 A1C                      6.0                 09/27/2022                 A1C                      5.2                 03/03/2022                 A1C                      7.1                 08/18/2021                 A1C                      6.3                 03/31/2021                 A1C                      6.7                 02/12/2021                 A1C                      6.4                 11/24/2020                Estefani Munoz, COT 8:51 AM  December 27, 2023     History of T2Dm since 2003  On insuslin   No blood thinner  No flomax     Myopic laser in situ keratomileusis (LASIK)           Last edited by Stan Roque MD on 12/27/2023 10:27 AM.         Review of systems for the eyes was negative other than the pertinent positives/negatives listed in the HPI.      Assessment & Plan    HPI:  Camacho Bhagat is a 58 year old male with history of T2DM, HTN, HLD, ED, liver transplant, laser in situ keratomileusis (LASIK) presents for cataract evaluation. He has noted decreased vision right eye, worse in last 10  months      POHx: lasik  PMHx: 2DM, HTN, HLD, ED, liver transplant  Current Medications: alpha-lipoic acid 600 MG capsule, Take 600 mg by mouth  amLODIPine (NORVASC) 10 MG tablet, Take 1 tablet (10 mg) by mouth daily  augmented betamethasone dipropionate (DIPROLENE-AF) 0.05 % external ointment, Apply twice daily as needed for rash on the legs  cholecalciferol (VITAMIN D3) 1000 UNIT tablet, Take 1 tablet (1,000 Units) by mouth daily (Patient taking differently: Take 1,000 Units by mouth every morning)  cyclobenzaprine (FLEXERIL) 10 MG tablet, Take 1 tablet (10 mg) by mouth 3 times daily as needed for muscle spasms  doxazosin (CARDURA) 2 MG tablet, Take 3 tablets (6 mg) by mouth At Bedtime  fluticasone (FLONASE) 50 MCG/ACT nasal spray, Spray 1 spray into both nostrils daily  furosemide (LASIX) 40 MG tablet, Take 60 mg AM and 40 mg afternoon  gabapentin (NEURONTIN) 800 MG tablet, Take 1 tablet (800 mg) by mouth 3 times daily  hydrOXYzine (ATARAX) 25 MG tablet, Take 1 tablet (25 mg) by mouth 3 times daily as needed for itching  Insulin Lispro (HUMALOG KWIKPEN) 200 UNIT/ML soln, To be used in the insulin pump. Total daily dose up to 170 U.  lidocaine (LIDODERM) 5 % patch, Place 1 patch onto the skin every 24 hours To prevent lidocaine toxicity, patient should be patch free for 12 hrs daily.  lidocaine (XYLOCAINE) 5 % external ointment, Apply topically as needed for moderate pain  losartan (COZAAR) 25 MG tablet, Take 1 tablet (25 mg) by mouth daily  methocarbamol (ROBAXIN) 500 MG tablet, Take 1 tablet (500 mg) by mouth 2 times daily as needed for muscle spasms  metoprolol succinate ER (TOPROL XL) 200 MG 24 hr tablet, Take 1 tablet (200 mg) by mouth daily  mycophenolic acid (GENERIC EQUIVALENT) 360 MG EC tablet, Take 2 tablets (720 mg) by mouth every 12 hours  oxyCODONE (ROXICODONE) 5 MG tablet, Take 1 tablet (5 mg) by mouth 4 times daily as needed for pain maximum 6 tablet(s) per day  patiromer (VELTASSA) 8.4 g packet,  Take 8.4 g by mouth daily  predniSONE (DELTASONE) 2.5 MG tablet, Take 1 tablet (2.5 mg) by mouth daily  sildenafil (REVATIO) 20 MG tablet, Take 2 tablets (40 mg) by mouth daily as needed (erectile dysfunction)  tacrolimus (GENERIC EQUIVALENT) 1 MG capsule, Take 4 capsules (4 mg) by mouth every morning AND 3 capsules (3 mg) every evening.  tretinoin (RETIN-A) 0.025 % external cream, Apply a pea-sized amount evenly over the face at nighttime before bed  triamcinolone (KENALOG) 0.1 % external ointment, Apply topically 2 times daily to the itching on the legs  ursodiol (ACTIGALL) 500 MG tablet, Take 1 tablet (500 mg) by mouth 2 times daily  alcohol swab prep pads, Use to swab area of injection/francisca as directed.  blood glucose (NO BRAND SPECIFIED) lancets standard, Use to test blood sugar 1 times daily or as directed.  blood glucose (NO BRAND SPECIFIED) test strip, Use to test blood sugar 1 times daily or as directed.  blood glucose calibration (NO BRAND SPECIFIED) solution, Use to calibrate meter.  Blood Glucose Monitoring Suppl (CONTOUR BLOOD GLUCOSE SYSTEM) w/Device KIT, For use with the Omnipod Dash insulin pump  Continuous Blood Gluc Sensor (DEXCOM G6 SENSOR) MISC, Change every 10 days.  Continuous Blood Gluc Transmit (DEXCOM G6 TRANSMITTER) MISC, Change every 3 months.  Insulin Disposable Pump (OMNIPOD 5 G6 INTRO, GEN 5,) KIT, 1 kit daily  Insulin Disposable Pump (OMNIPOD 5 G6 POD, GEN 5,) MISC, 1 each See Admin Instructions Change every 2 days. Use for insulin administration.  Insulin Disposable Pump (OMNIPOD 5 G6 POD, GEN 5,) MISC, 1 each See Admin Instructions Use per 's instructions.  insulin pen needle (BD ENE U/F) 32G X 4 MM miscellaneous, Use 1 pen needle 4 times daily or as directed.    No current facility-administered medications on file prior to visit.    FHx:  PSHx:      Current Eye Medications:      Assessment & Plan:  (H25.813) Combined forms of age-related cataract of both eyes  (primary  encounter diagnosis)  (H25.9) Age related cataract  Special equipment/needs:  Eye: right  Anesthesia:topical  Dilates to: 7  Iris expansion:  No IFIS   Pseudoexfoliation: No  Trypan Blue: No  Trauma: No    Able lay to flat: Yes  Blood Thinner: No   Tamsulosin: DOXAZOSIN  DM: Yes  Guttae: No    Dominant Eye: right    Plan: [place] right eye   Cataract is believed to be significantly contributing to patient's visual impairment. Cataract surgery recommended and expected to improve vision. Vision is not correctable by glasses or other nonsurgical measures. R/B/A were discussed with the patient. These included, but are not limited to: bleeding, infection, loss of vision, loss of the eye, need for more surgery, glaucoma, retinal detachment, need for glasses, corneal edema. The patient voiced understanding and wishes to proceed. All lens options were discussed with the patient including monofocal lenses, multifocal lenses, and toric lenses. The risks and benefits of each were discussed, including halos, glare, and possible need for glasses. No guarantees about vision after surgery were given. The patient voiced understanding and wishes to proceed    Proceed with CE/IOL right eye .  IFIS       (Z98.890) H/O laser assisted in situ keratomileusis  2003 with mild regression left eye     (H52.03,  H52.203,  H52.4) Hyperopia of both eyes with astigmatism and presbyopia  Hold pending Cataract extraction/iol    (E11.3213,  Z79.4) Type 2 diabetes mellitus with both eyes affected by mild nonproliferative retinopathy and macular edema, with long-term current use of insulin (H)  Diagnosed 2003  Most recent HgBA1c 6.2 on 11/30/23  Mild background diabetic retinopathy without neovascularization noted on today's exam.  Discussed ocular and systemic benefits of blood pressure and blood sugar control.  Mild Diabetic macular edema noted  Good blood glucose control and best corrected visual acuity left eye 20/25+. Discussed possible need for  injections in the future        No follow-ups on file.        Stan Roque MD     Attending Physician Attestation:  Complete documentation of historical and exam elements from today's encounter can be found in the full encounter summary report (not reduplicated in this progress note).  I personally obtained the chief complaint(s) and history of present illness.  I confirmed and edited as necessary the review of systems, past medical/surgical history, family history, social history, and examination findings as documented by others; and I examined the patient myself.  I personally reviewed the relevant tests, images, and reports as documented above.  I formulated and edited as necessary the assessment and plan and discussed the findings and management plan with the patient and family. - Stan Roque MD

## 2024-01-09 ENCOUNTER — LAB (OUTPATIENT)
Dept: LAB | Facility: CLINIC | Age: 59
End: 2024-01-09
Payer: COMMERCIAL

## 2024-01-09 DIAGNOSIS — Z94.4 LIVER REPLACED BY TRANSPLANT (H): ICD-10-CM

## 2024-01-09 DIAGNOSIS — Z13.220 LIPID SCREENING: ICD-10-CM

## 2024-01-09 DIAGNOSIS — N18.31 STAGE 3A CHRONIC KIDNEY DISEASE (H): ICD-10-CM

## 2024-01-09 LAB
ALBUMIN SERPL BCG-MCNC: 4.3 G/DL (ref 3.5–5.2)
ALP SERPL-CCNC: 162 U/L (ref 40–150)
ALT SERPL W P-5'-P-CCNC: 21 U/L (ref 0–70)
ANION GAP SERPL CALCULATED.3IONS-SCNC: 11 MMOL/L (ref 7–15)
AST SERPL W P-5'-P-CCNC: 26 U/L (ref 0–45)
BILIRUB DIRECT SERPL-MCNC: <0.2 MG/DL (ref 0–0.3)
BILIRUB SERPL-MCNC: 0.5 MG/DL
BUN SERPL-MCNC: 36.2 MG/DL (ref 8–23)
CALCIUM SERPL-MCNC: 9.3 MG/DL (ref 8.6–10)
CHLORIDE SERPL-SCNC: 101 MMOL/L (ref 98–107)
CHOLEST SERPL-MCNC: 155 MG/DL
CREAT SERPL-MCNC: 1.65 MG/DL (ref 0.67–1.17)
DEPRECATED HCO3 PLAS-SCNC: 23 MMOL/L (ref 22–29)
EGFRCR SERPLBLD CKD-EPI 2021: 48 ML/MIN/1.73M2
ERYTHROCYTE [DISTWIDTH] IN BLOOD BY AUTOMATED COUNT: 13.3 % (ref 10–15)
FASTING STATUS PATIENT QL REPORTED: YES
GLUCOSE SERPL-MCNC: 87 MG/DL (ref 70–99)
HCT VFR BLD AUTO: 34.5 % (ref 40–53)
HDLC SERPL-MCNC: 41 MG/DL
HGB BLD-MCNC: 11.9 G/DL (ref 13.3–17.7)
LDLC SERPL CALC-MCNC: 77 MG/DL
MAGNESIUM SERPL-MCNC: 1.8 MG/DL (ref 1.7–2.3)
MCH RBC QN AUTO: 30.7 PG (ref 26.5–33)
MCHC RBC AUTO-ENTMCNC: 34.5 G/DL (ref 31.5–36.5)
MCV RBC AUTO: 89 FL (ref 78–100)
NONHDLC SERPL-MCNC: 114 MG/DL
PHOSPHATE SERPL-MCNC: 3.7 MG/DL (ref 2.5–4.5)
PLATELET # BLD AUTO: 108 10E3/UL (ref 150–450)
POTASSIUM SERPL-SCNC: 5.3 MMOL/L (ref 3.4–5.3)
PROT SERPL-MCNC: 7.1 G/DL (ref 6.4–8.3)
RBC # BLD AUTO: 3.88 10E6/UL (ref 4.4–5.9)
SODIUM SERPL-SCNC: 135 MMOL/L (ref 135–145)
TACROLIMUS BLD-MCNC: 4 UG/L (ref 5–15)
TME LAST DOSE: ABNORMAL H
TME LAST DOSE: ABNORMAL H
TRIGL SERPL-MCNC: 185 MG/DL
WBC # BLD AUTO: 7.2 10E3/UL (ref 4–11)

## 2024-01-09 PROCEDURE — 84100 ASSAY OF PHOSPHORUS: CPT | Performed by: PATHOLOGY

## 2024-01-09 PROCEDURE — 99000 SPECIMEN HANDLING OFFICE-LAB: CPT | Performed by: PATHOLOGY

## 2024-01-09 PROCEDURE — 80197 ASSAY OF TACROLIMUS: CPT | Performed by: INTERNAL MEDICINE

## 2024-01-09 PROCEDURE — 83735 ASSAY OF MAGNESIUM: CPT | Performed by: PATHOLOGY

## 2024-01-09 PROCEDURE — 85027 COMPLETE CBC AUTOMATED: CPT | Performed by: PATHOLOGY

## 2024-01-09 PROCEDURE — 82248 BILIRUBIN DIRECT: CPT | Performed by: PATHOLOGY

## 2024-01-09 PROCEDURE — 82306 VITAMIN D 25 HYDROXY: CPT

## 2024-01-09 PROCEDURE — 80053 COMPREHEN METABOLIC PANEL: CPT | Performed by: PATHOLOGY

## 2024-01-09 PROCEDURE — 36415 COLL VENOUS BLD VENIPUNCTURE: CPT | Performed by: PATHOLOGY

## 2024-01-09 PROCEDURE — 81001 URINALYSIS AUTO W/SCOPE: CPT | Performed by: PATHOLOGY

## 2024-01-09 PROCEDURE — 84156 ASSAY OF PROTEIN URINE: CPT | Performed by: PATHOLOGY

## 2024-01-09 PROCEDURE — 80061 LIPID PANEL: CPT | Performed by: PATHOLOGY

## 2024-01-10 ENCOUNTER — OFFICE VISIT (OUTPATIENT)
Dept: NEPHROLOGY | Facility: CLINIC | Age: 59
End: 2024-01-10
Payer: COMMERCIAL

## 2024-01-10 VITALS
SYSTOLIC BLOOD PRESSURE: 155 MMHG | OXYGEN SATURATION: 97 % | DIASTOLIC BLOOD PRESSURE: 82 MMHG | BODY MASS INDEX: 39.63 KG/M2 | WEIGHT: 292.2 LBS | HEART RATE: 76 BPM | TEMPERATURE: 98.5 F

## 2024-01-10 DIAGNOSIS — J30.9 ALLERGIC RHINITIS, UNSPECIFIED SEASONALITY, UNSPECIFIED TRIGGER: ICD-10-CM

## 2024-01-10 DIAGNOSIS — E11.21 CONTROLLED TYPE 2 DIABETES MELLITUS WITH DIABETIC NEPHROPATHY, UNSPECIFIED WHETHER LONG TERM INSULIN USE (H): ICD-10-CM

## 2024-01-10 DIAGNOSIS — I10 BENIGN ESSENTIAL HYPERTENSION: ICD-10-CM

## 2024-01-10 DIAGNOSIS — N18.31 STAGE 3A CHRONIC KIDNEY DISEASE (H): Primary | ICD-10-CM

## 2024-01-10 DIAGNOSIS — R80.1 PERSISTENT PROTEINURIA: ICD-10-CM

## 2024-01-10 DIAGNOSIS — E87.5 HYPERKALEMIA: ICD-10-CM

## 2024-01-10 LAB
ALBUMIN MFR UR ELPH: 55.4 MG/DL
ALBUMIN UR-MCNC: 50 MG/DL
APPEARANCE UR: CLEAR
BILIRUB UR QL STRIP: NEGATIVE
COLOR UR AUTO: ABNORMAL
CREAT UR-MCNC: 75.1 MG/DL
GLUCOSE UR STRIP-MCNC: NEGATIVE MG/DL
HGB UR QL STRIP: NEGATIVE
HYALINE CASTS: 2 /LPF
KETONES UR STRIP-MCNC: NEGATIVE MG/DL
LEUKOCYTE ESTERASE UR QL STRIP: NEGATIVE
NITRATE UR QL: NEGATIVE
PH UR STRIP: 5.5 [PH] (ref 5–7)
PROT/CREAT 24H UR: 0.74 MG/MG CR (ref 0–0.2)
RBC URINE: 1 /HPF
SP GR UR STRIP: 1.01 (ref 1–1.03)
UROBILINOGEN UR STRIP-MCNC: NORMAL MG/DL
VIT D+METAB SERPL-MCNC: 43 NG/ML (ref 20–50)
WBC URINE: <1 /HPF

## 2024-01-10 PROCEDURE — 99213 OFFICE O/P EST LOW 20 MIN: CPT

## 2024-01-10 PROCEDURE — 99214 OFFICE O/P EST MOD 30 MIN: CPT

## 2024-01-10 RX ORDER — DOXAZOSIN 8 MG/1
8 TABLET ORAL AT BEDTIME
Qty: 90 TABLET | Refills: 3 | Status: SHIPPED | OUTPATIENT
Start: 2024-01-10

## 2024-01-10 RX ORDER — FLUTICASONE PROPIONATE 50 MCG
1 SPRAY, SUSPENSION (ML) NASAL DAILY
Qty: 20 ML | Refills: 3 | Status: SHIPPED | OUTPATIENT
Start: 2024-01-10

## 2024-01-10 RX ORDER — FUROSEMIDE 40 MG
60 TABLET ORAL 2 TIMES DAILY
Qty: 200 TABLET | Refills: 3 | Status: SHIPPED | OUTPATIENT
Start: 2024-01-10 | End: 2024-07-10

## 2024-01-10 ASSESSMENT — PAIN SCALES - GENERAL: PAINLEVEL: NO PAIN (0)

## 2024-01-10 NOTE — LETTER
1/10/2024       RE: Camacho Bhagat  [address]  [place] [address]     Dear Colleague,    Thank you for referring your patient, Camacho Bhagat, to the [facility] [place] [facility]. Please see a copy of my visit note below.    Nephrology Clinic Visit 1/10/24    Assessment and Plan:       1. CKD3a w/mild proteinuria - Stable. Creat 1.6, eGFR 48 ml/mn.  UPCR down to 0.7 mg/mg Cr from 1.3, 2.2 g/g/Cr following addition of Losartan. I think we can get it down further with improvement in b/ps and addition of SGLT2i  Blood pressures uncontrolled   Baseline remains in the low to mid 1 range since 2019.    Etiology of CKD likely multifactorial; DM, recurrent EJ, CNI.    - Had been intolerant of ACE/ARB previously given problems with hyperkalemia assoc with Tacrolimus, but retrial of ACE/ARB when TAC level was <0.3 did not result in Hyperkalemia. In fact Valtessa had been discontinued because he was becoming hypokalemic.    - Patient was restarted on ARB in 4/18 for unexplained nephrotic range proteinuria following ostomy takedown with improvement in UPCR, but then developed mild acute rejection and Tac dose was increased resulting in hyperkalemia. He was restarted on Valtassa with improvement in hyperkalemia and Losartan was discontinued again in 3/19   - We restarted Losartan 25 mg every day following the 5/22 visit. His K had been fine until 9/22, but Camacho tells me today, that he never restarted Veltassa following the visit on 9/22     - Does not use NSAIDs.    - DM well controlled with A1c 6.2% ( 11/23)      2. HTN/ - Uncontrolled w/edema. Home blood pressures 160's/. Clinic b/ps today 155-166/82-88. Weight has been relatively stable since our last visit.     Current antihypertensive regimen:      Lasix 60 mg AM/40 mg PM     Amlodipine 10 mg qd      Toprol  mg every day     Losartan 25 mg every day ( unable to increase given  borderline K)     Doxazosin 6 mg HS       - Increase Lasix to 60 mg bid       - Increase Doxazosin to 10 mg every day       - Begin Jardiance 10 mg qd       - No improvement despite CPAP       - Strongly encouraged weight loss       - Will send in b/p readings in 1-2 wks      3. Hyperkalemia assoc with Tac/ARB - K 5.3 Likely combination of ARB/TAC/Food. .    - Hasn't been taking Veltassa for over one year. He's done pretty well with K in the upper 4 to low 5 range over this year   - Will hold off on restarting Veltassa   - Continue dietary Potassium modification      4. Volume status - Hypervolemia w/o dyspnea. Weight 292.2 #, from 290#, 287#, 284#, 250#. Was 242# in 12/20.  Blood pressures uncontrolled   - Increase Lasix to 60 mg bid.    - Add Jardiance   - Work on weight loss      5. Acid base - No acute concerns. Bicarb 23       6. BMD - Ca 9.3, Phos 3.7, albumin 4.3   - Vitamin D 43 ( 1/24)   - PTH 28 (11/21). Will recheck with next set of labs    - Continue Vit D 1000 U qd      7.Anemia -Hgb 11.9    - Iron studies 5/21: Ferritin 935, Fe 69, IS 26   - Had colonoscopy 10/22    - Not on iron and has not needed DIPAK      8. Liver transplant IS: Tacrolimus, MMF, Pred. Tac level 3.8   - Per transplant   - Last seen by Dr Camejo 8/1/23    9. DM2- Well controlled on Insulin pump. A1c 6.2%.   Has not tolerated Victoza due to worsening diarrhea so unlikely to tolerate Ozempic but could really use help with weight loss  I am adding Jardiance today. The Cautions were reviewed regarding patricia area infections/dehydration and potential need for reduction in his insulin dose      10. Dispo- RCT 7/10/24 for follow up with labs prior.       Reason for Visit:  CKD3a/HTN follow up        HPI:  Mr Bhagat is a 59 yo male with CKD3a, HTN, Chronic hyperkalemia, DM2, Liver transplant, present today for CKD/HTN followup. Last seen in clinic by me on 9/8/22.   At that visit Furosemide was increased to 60/40 and Doxazosin 2 mg HS started. In  the interim Doxazosin has been increased to 6 mg  Baseline creat low to mid 1's.       Interval Hx:     No hospital admissions.       ROS:  Camacho reports no improvement with b/p. Home readings 160's/ with increase in edema.    Has not been taking Valtessa since our last visit in 9/22   Finding it hard to exercise outside of his work day given long hours with commute and chronic foot/back pain.   Using his CPAP   DM well controlled with recent A1c 6.2%     Appetite good  Energy level decreased  Reports edema.   Denies dyspnea,  CP, abdominal pain. Voiding w/o difficulty.   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 Former smoker,   by profession, now [employer]. Exercise is limited due to hip/knee pain.   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    Diabetes Father     Hypertension Father     Substance Abuse Father     Cervical Cancer Maternal Grandmother     Cerebrovascular Disease Maternal Grandfather     No Known Problems Paternal Grandmother     Prostate Cancer Paternal Grandfather     Colon Cancer No family hx of     Hyperlipidemia No family hx of     Coronary Artery Disease No family hx of     Breast Cancer No family hx of     Glaucoma No family hx of        Allergies:  Allergies   Allergen Reactions    Erythromycin GI Disturbance    Losartan Other (See Comments)     Consistently develops hyperkalemia    Vioxx      Nausea, vomiting       Medications:  Current Outpatient Medications    Medication Sig    alcohol swab prep pads Use to swab area of injection/francisca as directed.    alpha-lipoic acid 600 MG capsule Take 600 mg by mouth    amLODIPine (NORVASC) 10 MG tablet Take 1 tablet (10 mg) by mouth daily    augmented betamethasone dipropionate (DIPROLENE-AF) 0.05 % external ointment Apply twice daily as needed for rash on the legs    blood glucose (NO BRAND SPECIFIED) lancets standard Use to test blood sugar 1 times daily or as directed.    blood glucose (NO BRAND SPECIFIED) test strip Use to test blood sugar 1 times daily or as directed.    blood glucose calibration (NO BRAND SPECIFIED) solution Use to calibrate meter.    Blood Glucose Monitoring Suppl (CONTOUR BLOOD GLUCOSE SYSTEM) w/Device KIT For use with the Omnipod Dash insulin pump    cholecalciferol (VITAMIN D3) 1000 UNIT tablet Take 1 tablet (1,000 Units) by mouth daily (Patient taking differently: Take 1,000 Units by mouth every morning)    Continuous Blood Gluc Sensor (DEXCOM G6 SENSOR) MISC Change every 10 days.    Continuous Blood Gluc Transmit (DEXCOM G6 TRANSMITTER) MISC Change every 3 months.    cyclobenzaprine (FLEXERIL) 10 MG tablet Take 1 tablet (10 mg) by mouth 3 times daily as needed for muscle spasms    doxazosin (CARDURA) 8 MG tablet Take 1 tablet (8 mg) by mouth at bedtime Take a total of 10 mg at bedtime    empagliflozin (JARDIANCE) 10 MG TABS tablet Take 1 tablet (10 mg) by mouth daily    fluticasone (FLONASE) 50 MCG/ACT nasal spray Spray 1 spray into both nostrils daily    furosemide (LASIX) 40 MG tablet Take 1.5 tablets (60 mg) by mouth 2 times daily .    gabapentin (NEURONTIN) 800 MG tablet Take 1 tablet (800 mg) by mouth 3 times daily    hydrOXYzine (ATARAX) 25 MG tablet Take 1 tablet (25 mg) by mouth 3 times daily as needed for itching    Insulin Disposable Pump (OMNIPOD 5 G6 INTRO, GEN 5,) KIT 1 kit daily    Insulin Disposable Pump (OMNIPOD 5 G6 POD, GEN 5,) MISC 1 each See Admin Instructions Change every 2 days.  Use for insulin administration.    Insulin Disposable Pump (OMNIPOD 5 G6 POD, GEN 5,) MISC 1 each See Admin Instructions Use per 's instructions.    Insulin Lispro (HUMALOG KWIKPEN) 200 UNIT/ML soln To be used in the insulin pump. Total daily dose up to 170 U.    insulin pen needle (BD ENE U/F) 32G X 4 MM miscellaneous Use 1 pen needle 4 times daily or as directed.    lidocaine (LIDODERM) 5 % patch Place 1 patch onto the skin every 24 hours To prevent lidocaine toxicity, patient should be patch free for 12 hrs daily.    lidocaine (XYLOCAINE) 5 % external ointment Apply topically as needed for moderate pain    losartan (COZAAR) 25 MG tablet Take 1 tablet (25 mg) by mouth daily    methocarbamol (ROBAXIN) 500 MG tablet Take 1 tablet (500 mg) by mouth 2 times daily as needed for muscle spasms    metoprolol succinate ER (TOPROL XL) 200 MG 24 hr tablet Take 1 tablet (200 mg) by mouth daily    mycophenolic acid (GENERIC EQUIVALENT) 360 MG EC tablet Take 2 tablets (720 mg) by mouth every 12 hours    oxyCODONE (ROXICODONE) 5 MG tablet Take 1 tablet (5 mg) by mouth 4 times daily as needed for pain maximum 6 tablet(s) per day    predniSONE (DELTASONE) 2.5 MG tablet Take 1 tablet (2.5 mg) by mouth daily    sildenafil (REVATIO) 20 MG tablet Take 2 tablets (40 mg) by mouth daily as needed (erectile dysfunction)    tacrolimus (GENERIC EQUIVALENT) 1 MG capsule Take 4 capsules (4 mg) by mouth every morning AND 3 capsules (3 mg) every evening.    tretinoin (RETIN-A) 0.025 % external cream Apply a pea-sized amount evenly over the face at nighttime before bed    triamcinolone (KENALOG) 0.1 % external ointment Apply topically 2 times daily to the itching on the legs    ursodiol (ACTIGALL) 500 MG tablet Take 1 tablet (500 mg) by mouth 2 times daily     No current facility-administered medications for this visit.      Vitals:  B/ps 155-166/82-88   HR 74  Weight 292.2#    Exam:  GEN: Pleasant male in NAD  CARDIAC: RRR  LUNGS:  CTA  ABDOMEN: Obese  EXT: 1+ LE edema  NEURO: A/O    LABS:   CMP  Recent Labs   Lab Test 01/09/24 1714 11/30/23 1648 09/25/23  1029 08/17/23  1542 08/17/23  1346 07/31/23  1704 08/18/21  1236 06/08/21  1628 05/27/21  1729 03/31/21  1145 02/12/21  1331    136 138  --   --  139  140   < > 134 131* 138 138   POTASSIUM 5.3 4.8 5.0  --   --  4.7  4.7   < > 4.9 5.1 4.5 5.8*   CHLORIDE 101 104 107  --   --  107  107   < > 105 102 109 111*   CO2 23 21* 20*  --   --  20*  21*   < > 23 23 22 22   ANIONGAP 11 11 11  --   --  12  12   < > 6 6 7 5   GLC 87 175* 128* 89   < > 75  77   < > 249* 335* 134* 251*   BUN 36.2* 47.1* 35.3*  --   --  35.5*  35.1*   < > 43* 50* 54* 44*   CR 1.65* 2.09* 1.81*  --   --  1.57*  1.58*   < > 1.51* 1.53* 1.56* 1.32*   GFRESTIMATED 48* 36* 43*  --   --  51*  50*   < > 51* 50* 49* 60*   GFRESTBLACK  --   --   --   --   --   --   --  59* 58* 57* 69   JACOB 9.3 8.8 8.6  --   --  9.3  9.3   < > 8.9 9.1 8.9 8.7    < > = values in this interval not displayed.     Recent Labs   Lab Test 01/09/24 1714 11/30/23 1648 09/25/23  1029 07/31/23  1704   BILITOTAL 0.5 0.4 0.5 0.5   ALKPHOS 162* 164* 153* 173*   ALT 21 23 24 27   AST 26 25 28 32     CBC  Recent Labs   Lab Test 01/09/24 1714 11/30/23  1648 09/25/23  1029 07/31/23  1704   HGB 11.9* 10.3* 10.9* 10.8*   WBC 7.2 6.1 6.6 6.1   RBC 3.88* 3.38* 3.63* 3.54*   HCT 34.5* 30.3* 33.0* 31.9*   MCV 89 90 91 90   MCH 30.7 30.5 30.0 30.5   MCHC 34.5 34.0 33.0 33.9   RDW 13.3 13.9 13.2 13.6   * 121* 91* 104*     URINE STUDIES  Recent Labs   Lab Test 01/09/24 2010 01/30/23  0750 05/24/22  1035 11/24/20  1325   COLOR Light Yellow Light Yellow Light Yellow Yellow   APPEARANCE Clear Clear Clear Clear   URINEGLC Negative Negative Negative Negative   URINEBILI Negative Negative Negative Negative   URINEKETONE Negative Negative Negative Negative   SG 1.010 1.013 1.014 1.014   UBLD Negative Negative Trace* Negative   URINEPH 5.5 5.0 5.5 5.0    PROTEIN 50* 20* 100* 20*   NITRITE Negative Negative Negative Negative   LEUKEST Negative Negative Negative Negative   RBCU 1 1 1 1   WBCU <1 <1 1 <1     Recent Labs   Lab Test 01/30/23  0750 09/02/22  1143 05/24/22  1035 03/03/22  1145   UTPG 0.34* 1.35* 2.26* 1.58*     PTH  Recent Labs   Lab Test 09/27/22  0841 12/01/21  1359 11/24/20  1345   PTHI 31 28 29     IRON STUDIES  Recent Labs   Lab Test 09/27/22  0841 05/27/21  1729 11/24/20  1345   IRON 109 69 105    267 249   IRONSAT 44 26 42   NELY 624* 935* 1,104*       Cinda Cazares, NP

## 2024-01-10 NOTE — NURSING NOTE
Chief Complaint   Patient presents with    RECHECK     RETURN NEPHROLOGY     BP (!) 155/82 (BP Location: Right arm, Patient Position: Sitting, Cuff Size: Adult Large)   Pulse 76   Temp 98.5  F (36.9  C) (Oral)   Wt 132.5 kg (292 lb 3.2 oz)   SpO2 97%   BMI 39.63 kg/m    Mitch Baptiste CMA on 1/10/2024 at 9:39 AM

## 2024-01-11 ENCOUNTER — MYC REFILL (OUTPATIENT)
Dept: INTERNAL MEDICINE | Facility: CLINIC | Age: 59
End: 2024-01-11
Payer: COMMERCIAL

## 2024-01-11 ENCOUNTER — MYC REFILL (OUTPATIENT)
Dept: GASTROENTEROLOGY | Facility: CLINIC | Age: 59
End: 2024-01-11
Payer: COMMERCIAL

## 2024-01-11 DIAGNOSIS — Z94.4 HISTORY OF LIVER TRANSPLANT (H): ICD-10-CM

## 2024-01-11 DIAGNOSIS — I10 ESSENTIAL HYPERTENSION: ICD-10-CM

## 2024-01-11 DIAGNOSIS — Z79.60 LONG-TERM USE OF IMMUNOSUPPRESSANT MEDICATION: ICD-10-CM

## 2024-01-11 RX ORDER — MYCOPHENOLIC ACID 360 MG/1
720 TABLET, DELAYED RELEASE ORAL EVERY 12 HOURS
Qty: 360 TABLET | Refills: 3 | Status: SHIPPED | OUTPATIENT
Start: 2024-01-11

## 2024-01-11 RX ORDER — METOPROLOL SUCCINATE 200 MG/1
200 TABLET, EXTENDED RELEASE ORAL DAILY
Qty: 90 TABLET | Refills: 3 | Status: SHIPPED | OUTPATIENT
Start: 2024-01-11

## 2024-01-11 NOTE — TELEPHONE ENCOUNTER
metoprolol succinate ER (TOPROL XL) 200 MG 24 hr tablet   Last Written Prescription Date:  12/5/2022  Last Fill Quantity: 90,   # refills: 3  Last Office Visit : 5/15/2023  Future Office visit:  1/24/2024    Routing refill request to provider for review/approval because:  Abnormal B/P  Change med?   Change dose?   Add med?  Follow up B/P check?  Refer to clinic for review and refills for Pt care.   BP Readings from Last 3 Encounters:   01/10/24 (!) 155/82   08/17/23 132/71   08/01/23 (!) 175/87     Alisa Vega RN  Central Triage Red Flags/Med Refills

## 2024-01-11 NOTE — PROGRESS NOTES
Nephrology Clinic Visit 1/10/24    Assessment and Plan:       1. CKD3a w/mild proteinuria - Stable. Creat 1.6, eGFR 48 ml/mn.  UPCR down to 0.7 mg/mg Cr from 1.3, 2.2 g/g/Cr following addition of Losartan. I think we can get it down further with improvement in b/ps and addition of SGLT2i  Blood pressures uncontrolled   Baseline remains in the low to mid 1 range since 2019.    Etiology of CKD likely multifactorial; DM, recurrent EJ, CNI.    - Had been intolerant of ACE/ARB previously given problems with hyperkalemia assoc with Tacrolimus, but retrial of ACE/ARB when TAC level was <0.3 did not result in Hyperkalemia. In fact Valtessa had been discontinued because he was becoming hypokalemic.    - Patient was restarted on ARB in 4/18 for unexplained nephrotic range proteinuria following ostomy takedown with improvement in UPCR, but then developed mild acute rejection and Tac dose was increased resulting in hyperkalemia. He was restarted on Valtassa with improvement in hyperkalemia and Losartan was discontinued again in 3/19   - We restarted Losartan 25 mg every day following the 5/22 visit. His K had been fine until 9/22, but Camacho tells me today, that he never restarted Veltassa following the visit on 9/22     - Does not use NSAIDs.    - DM well controlled with A1c 6.2% ( 11/23)      2. HTN/ - Uncontrolled w/edema. Home blood pressures 160's/. Clinic b/ps today 155-166/82-88. Weight has been relatively stable since our last visit.     Current antihypertensive regimen:      Lasix 60 mg AM/40 mg PM     Amlodipine 10 mg qd      Toprol  mg every day     Losartan 25 mg every day ( unable to increase given borderline K)     Doxazosin 6 mg HS       - Increase Lasix to 60 mg bid       - Increase Doxazosin to 10 mg every day       - Begin Jardiance 10 mg qd       - No improvement despite CPAP       - Strongly encouraged weight loss       - Will send in b/p readings in 1-2 wks      3. Hyperkalemia assoc with Tac/ARB - K  5.3 Likely combination of ARB/TAC/Food. .    - Hasn't been taking Veltassa for over one year. He's done pretty well with K in the upper 4 to low 5 range over this year   - Will hold off on restarting Veltassa   - Continue dietary Potassium modification      4. Volume status - Hypervolemia w/o dyspnea. Weight 292.2 #, from 290#, 287#, 284#, 250#. Was 242# in 12/20.  Blood pressures uncontrolled   - Increase Lasix to 60 mg bid.    - Add Jardiance   - Work on weight loss      5. Acid base - No acute concerns. Bicarb 23       6. BMD - Ca 9.3, Phos 3.7, albumin 4.3   - Vitamin D 43 ( 1/24)   - PTH 28 (11/21). Will recheck with next set of labs    - Continue Vit D 1000 U qd      7.Anemia -Hgb 11.9    - Iron studies 5/21: Ferritin 935, Fe 69, IS 26   - Had colonoscopy 10/22    - Not on iron and has not needed IDPAK      8. Liver transplant IS: Tacrolimus, MMF, Pred. Tac level 3.8   - Per transplant   - Last seen by Dr Camejo 8/1/23    9. DM2- Well controlled on Insulin pump. A1c 6.2%.   Has not tolerated Victoza due to worsening diarrhea so unlikely to tolerate Ozempic but could really use help with weight loss  I am adding Jardiance today. The Cautions were reviewed regarding patricia area infections/dehydration and potential need for reduction in his insulin dose      10. Dispo- RCT 7/10/24 for follow up with labs prior.       Reason for Visit:  CKD3a/HTN follow up        HPI:  Mr Bhagat is a 57 yo male with CKD3a, HTN, Chronic hyperkalemia, DM2, Liver transplant, present today for CKD/HTN followup. Last seen in clinic by me on 9/8/22.   At that visit Furosemide was increased to 60/40 and Doxazosin 2 mg HS started. In the interim Doxazosin has been increased to 6 mg  Baseline creat low to mid 1's.       Interval Hx:     No hospital admissions.       ROS:  Camacho reports no improvement with b/p. Home readings 160's/ with increase in edema.    Has not been taking Valtessa since our last visit in 9/22   Finding it hard to exercise  outside of his work day given long hours with commute and chronic foot/back pain.   Using his CPAP   DM well controlled with recent A1c 6.2%     Appetite good  Energy level decreased  Reports edema.   Denies dyspnea,  CP, abdominal pain. Voiding w/o difficulty.   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 Former smoker,   by profession, now [employer]. Exercise is limited due to hip/knee pain.   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    Diabetes Father     Hypertension Father     Substance Abuse Father     Cervical Cancer Maternal Grandmother     Cerebrovascular Disease Maternal Grandfather     No Known Problems Paternal Grandmother     Prostate Cancer Paternal Grandfather     Colon Cancer No family hx of     Hyperlipidemia No family hx of     Coronary Artery Disease No family hx of     Breast Cancer No family hx of     Glaucoma No family hx of        Allergies:  Allergies   Allergen Reactions    Erythromycin GI Disturbance    Losartan Other (See Comments)     Consistently develops hyperkalemia    Vioxx      Nausea, vomiting       Medications:  Current Outpatient Medications   Medication Sig    alcohol swab prep pads Use to swab area of injection/francisca as directed.    alpha-lipoic acid 600 MG capsule Take 600 mg by mouth    amLODIPine (NORVASC) 10 MG tablet Take 1 tablet (10 mg) by mouth daily    augmented betamethasone dipropionate (DIPROLENE-AF) 0.05 % external ointment Apply twice daily as  needed for rash on the legs    blood glucose (NO BRAND SPECIFIED) lancets standard Use to test blood sugar 1 times daily or as directed.    blood glucose (NO BRAND SPECIFIED) test strip Use to test blood sugar 1 times daily or as directed.    blood glucose calibration (NO BRAND SPECIFIED) solution Use to calibrate meter.    Blood Glucose Monitoring Suppl (CONTOUR BLOOD GLUCOSE SYSTEM) w/Device KIT For use with the Omnipod Dash insulin pump    cholecalciferol (VITAMIN D3) 1000 UNIT tablet Take 1 tablet (1,000 Units) by mouth daily (Patient taking differently: Take 1,000 Units by mouth every morning)    Continuous Blood Gluc Sensor (DEXCOM G6 SENSOR) MISC Change every 10 days.    Continuous Blood Gluc Transmit (DEXCOM G6 TRANSMITTER) MISC Change every 3 months.    cyclobenzaprine (FLEXERIL) 10 MG tablet Take 1 tablet (10 mg) by mouth 3 times daily as needed for muscle spasms    doxazosin (CARDURA) 8 MG tablet Take 1 tablet (8 mg) by mouth at bedtime Take a total of 10 mg at bedtime    empagliflozin (JARDIANCE) 10 MG TABS tablet Take 1 tablet (10 mg) by mouth daily    fluticasone (FLONASE) 50 MCG/ACT nasal spray Spray 1 spray into both nostrils daily    furosemide (LASIX) 40 MG tablet Take 1.5 tablets (60 mg) by mouth 2 times daily .    gabapentin (NEURONTIN) 800 MG tablet Take 1 tablet (800 mg) by mouth 3 times daily    hydrOXYzine (ATARAX) 25 MG tablet Take 1 tablet (25 mg) by mouth 3 times daily as needed for itching    Insulin Disposable Pump (OMNIPOD 5 G6 INTRO, GEN 5,) KIT 1 kit daily    Insulin Disposable Pump (OMNIPOD 5 G6 POD, GEN 5,) MISC 1 each See Admin Instructions Change every 2 days. Use for insulin administration.    Insulin Disposable Pump (OMNIPOD 5 G6 POD, GEN 5,) MISC 1 each See Admin Instructions Use per 's instructions.    Insulin Lispro (HUMALOG KWIKPEN) 200 UNIT/ML soln To be used in the insulin pump. Total daily dose up to 170 U.    insulin pen needle (BD ENE U/F) 32G X 4 MM  miscellaneous Use 1 pen needle 4 times daily or as directed.    lidocaine (LIDODERM) 5 % patch Place 1 patch onto the skin every 24 hours To prevent lidocaine toxicity, patient should be patch free for 12 hrs daily.    lidocaine (XYLOCAINE) 5 % external ointment Apply topically as needed for moderate pain    losartan (COZAAR) 25 MG tablet Take 1 tablet (25 mg) by mouth daily    methocarbamol (ROBAXIN) 500 MG tablet Take 1 tablet (500 mg) by mouth 2 times daily as needed for muscle spasms    metoprolol succinate ER (TOPROL XL) 200 MG 24 hr tablet Take 1 tablet (200 mg) by mouth daily    mycophenolic acid (GENERIC EQUIVALENT) 360 MG EC tablet Take 2 tablets (720 mg) by mouth every 12 hours    oxyCODONE (ROXICODONE) 5 MG tablet Take 1 tablet (5 mg) by mouth 4 times daily as needed for pain maximum 6 tablet(s) per day    predniSONE (DELTASONE) 2.5 MG tablet Take 1 tablet (2.5 mg) by mouth daily    sildenafil (REVATIO) 20 MG tablet Take 2 tablets (40 mg) by mouth daily as needed (erectile dysfunction)    tacrolimus (GENERIC EQUIVALENT) 1 MG capsule Take 4 capsules (4 mg) by mouth every morning AND 3 capsules (3 mg) every evening.    tretinoin (RETIN-A) 0.025 % external cream Apply a pea-sized amount evenly over the face at nighttime before bed    triamcinolone (KENALOG) 0.1 % external ointment Apply topically 2 times daily to the itching on the legs    ursodiol (ACTIGALL) 500 MG tablet Take 1 tablet (500 mg) by mouth 2 times daily     No current facility-administered medications for this visit.      Vitals:  B/ps 155-166/82-88   HR 74  Weight 292.2#    Exam:  GEN: Pleasant male in NAD  CARDIAC: RRR  LUNGS: CTA  ABDOMEN: Obese  EXT: 1+ LE edema  NEURO: A/O    LABS:   CMP  Recent Labs   Lab Test 01/09/24  1714 11/30/23  1648 09/25/23  1029 08/17/23  1542 08/17/23  1346 07/31/23  1704 08/18/21  1236 06/08/21  1628 05/27/21  1729 03/31/21  1145 02/12/21  1331    136 138  --   --  139  140   < > 134 131* 138 138    POTASSIUM 5.3 4.8 5.0  --   --  4.7  4.7   < > 4.9 5.1 4.5 5.8*   CHLORIDE 101 104 107  --   --  107  107   < > 105 102 109 111*   CO2 23 21* 20*  --   --  20*  21*   < > 23 23 22 22   ANIONGAP 11 11 11  --   --  12  12   < > 6 6 7 5   GLC 87 175* 128* 89   < > 75  77   < > 249* 335* 134* 251*   BUN 36.2* 47.1* 35.3*  --   --  35.5*  35.1*   < > 43* 50* 54* 44*   CR 1.65* 2.09* 1.81*  --   --  1.57*  1.58*   < > 1.51* 1.53* 1.56* 1.32*   GFRESTIMATED 48* 36* 43*  --   --  51*  50*   < > 51* 50* 49* 60*   GFRESTBLACK  --   --   --   --   --   --   --  59* 58* 57* 69   JACOB 9.3 8.8 8.6  --   --  9.3  9.3   < > 8.9 9.1 8.9 8.7    < > = values in this interval not displayed.     Recent Labs   Lab Test 01/09/24 1714 11/30/23 1648 09/25/23 1029 07/31/23  1704   BILITOTAL 0.5 0.4 0.5 0.5   ALKPHOS 162* 164* 153* 173*   ALT 21 23 24 27   AST 26 25 28 32     CBC  Recent Labs   Lab Test 01/09/24 1714 11/30/23 1648 09/25/23 1029 07/31/23  1704   HGB 11.9* 10.3* 10.9* 10.8*   WBC 7.2 6.1 6.6 6.1   RBC 3.88* 3.38* 3.63* 3.54*   HCT 34.5* 30.3* 33.0* 31.9*   MCV 89 90 91 90   MCH 30.7 30.5 30.0 30.5   MCHC 34.5 34.0 33.0 33.9   RDW 13.3 13.9 13.2 13.6   * 121* 91* 104*     URINE STUDIES  Recent Labs   Lab Test 01/09/24 2010 01/30/23  0750 05/24/22  1035 11/24/20  1325   COLOR Light Yellow Light Yellow Light Yellow Yellow   APPEARANCE Clear Clear Clear Clear   URINEGLC Negative Negative Negative Negative   URINEBILI Negative Negative Negative Negative   URINEKETONE Negative Negative Negative Negative   SG 1.010 1.013 1.014 1.014   UBLD Negative Negative Trace* Negative   URINEPH 5.5 5.0 5.5 5.0   PROTEIN 50* 20* 100* 20*   NITRITE Negative Negative Negative Negative   LEUKEST Negative Negative Negative Negative   RBCU 1 1 1 1   WBCU <1 <1 1 <1     Recent Labs   Lab Test 01/30/23  0750 09/02/22  1143 05/24/22  1035 03/03/22  1145   UTPG 0.34* 1.35* 2.26* 1.58*     PTH  Recent Labs   Lab Test 09/27/22  0841  12/01/21  1359 11/24/20  1345   PTHI 31 28 29     IRON STUDIES  Recent Labs   Lab Test 09/27/22  0841 05/27/21  1729 11/24/20  1345   IRON 109 69 105    267 249   IRONSAT 44 26 42   NELY 624* 935* 1,104*       Cinda Cazares, NP

## 2024-01-16 ENCOUNTER — TELEPHONE (OUTPATIENT)
Dept: ENDOCRINOLOGY | Facility: CLINIC | Age: 59
End: 2024-01-16
Payer: COMMERCIAL

## 2024-01-16 NOTE — TELEPHONE ENCOUNTER
Patient call:     Appointment type: RETURN DIABETES   Provider: JATINDER KUMAR   Return date:SCHEDULE FIRST AVAILABLE   Speciality phone number: [phone]  Additional appointment(s) needed:  MTM   Additional notes:  LVM AND SENT LIZZ Calvillo on 1/16/2024 at 3:16 PM

## 2024-01-18 ENCOUNTER — TELEPHONE (OUTPATIENT)
Dept: ENDOCRINOLOGY | Facility: CLINIC | Age: 59
End: 2024-01-18
Payer: COMMERCIAL

## 2024-01-18 NOTE — TELEPHONE ENCOUNTER
Patient call:      Appointment type: RETURN DIABETES   Provider: JATINDER KUMAR   Return date:SCHEDULE FIRST AVAILABLE   Speciality phone number: [phone]  Additional appointment(s) needed:  MTM   Additional notes:  LVM AND SENT MYC mireille (final)     Carlyn Mendes on 1/18/2024 at 9:57 AM

## 2024-01-24 ENCOUNTER — OFFICE VISIT (OUTPATIENT)
Dept: INTERNAL MEDICINE | Facility: CLINIC | Age: 59
End: 2024-01-24
Payer: COMMERCIAL

## 2024-01-24 VITALS
DIASTOLIC BLOOD PRESSURE: 75 MMHG | WEIGHT: 292.4 LBS | HEART RATE: 60 BPM | BODY MASS INDEX: 39.6 KG/M2 | OXYGEN SATURATION: 100 % | SYSTOLIC BLOOD PRESSURE: 126 MMHG | HEIGHT: 72 IN

## 2024-01-24 DIAGNOSIS — Z00.00 ROUTINE GENERAL MEDICAL EXAMINATION AT A HEALTH CARE FACILITY: ICD-10-CM

## 2024-01-24 DIAGNOSIS — N18.30 STAGE 3 CHRONIC KIDNEY DISEASE, UNSPECIFIED WHETHER STAGE 3A OR 3B CKD (H): Primary | ICD-10-CM

## 2024-01-24 PROCEDURE — 99214 OFFICE O/P EST MOD 30 MIN: CPT | Performed by: INTERNAL MEDICINE

## 2024-01-24 NOTE — PROGRESS NOTES
"Surgeon: Stan Roque MD   Fax number for Preop Evaluation:   Location of Surgery: [facility] [place]  Date of Surgery: 2/1/24  Procedure: RIGHT EYE PHACOEMULSIFICATION, CATARACT, WITH INTRAOCULAR LENS IMPLANT   History of reaction to anesthesia? no    Camacho is a very pleasant 58 year old male here for a preop exam prior to cataract surgery.  He feels well, currently, no concerns today.  Denies any cardiac symptoms.  No fevers, chills, cough, dysuria.  Glucose has been well controlled.    He is having cataract surgery after he noticed a change in vision. This is a very low risk procedure.    No changes to past, family, or social history and  ROS: 10 point ROS neg other than the symptoms noted above in the HPI.     PHYSICAL EXAM:  /75 (BP Location: Right arm, Patient Position: Sitting, Cuff Size: Adult Large)   Pulse 60   Ht 1.829 m (6' 0.01\")   Wt 132.6 kg (292 lb 6.4 oz)   SpO2 100%   BMI 39.65 kg/m     Constitutional: no distress, comfortable, pleasant   Eyes: anicteric, normal extra-ocular movements   Ears, Nose and Throat: tympanic membranes clear, neck supple with full range of motion, no thyromegaly.   Cardiovascular: regular rate and rhythm, normal S1 and S2, no murmurs, rubs or gallops, peripheral pulses full and symmetric   Respiratory: clear to auscultation, no wheezes or crackles, normal breath sounds  Gastrointestinal: positive bowel sounds, nontender, no hepatosplenomegaly, no masses   Musculoskeletal: knees full range of motion, no effusion  Skin: no concerning lesions, no jaundice   Neurological: normal gait, no tremor   Psychological: appropriate mood   Lymphatic: no cervical lymphadenopathy; trace ankle edema L>R       A/P 58 year old here for preop. Low risk for perioperative events; proceed with cataract surgery as planned.  Continue all current medications.  No further testing indicated at this time.    Inna Blanchard MD    "

## 2024-01-24 NOTE — PROGRESS NOTES
Camacho is a 58 year old that presents in clinic today for the following:     Chief Complaint   Patient presents with    Pre-Op Exam     Pt here for pre-op for RIGHT EYE PHACOEMULSIFICATION, CATARACT, WITH INTRAOCULAR LENS IMPLANT on 2/1/24 with [place]que [place] [facility] [place]           1/24/2024    10:53 AM   Additional Questions   Roomed by Moriah Lott   Accompanied by N/A     Screenings as of today     PEG Total Score 2        Moriah Lott at 11:00 AM on 1/24/2024

## 2024-01-24 NOTE — PATIENT INSTRUCTIONS
Low risk for perioperative events; proceed with cataract surgery as planned.  Continue all current medications.  No further testing indicated at this time.    Inna Blanchard MD

## 2024-01-26 DIAGNOSIS — Z94.4 LIVER REPLACED BY TRANSPLANT (H): ICD-10-CM

## 2024-01-26 DIAGNOSIS — Z79.60 LONG-TERM USE OF IMMUNOSUPPRESSANT MEDICATION: ICD-10-CM

## 2024-01-26 RX ORDER — PREDNISONE 2.5 MG/1
2.5 TABLET ORAL DAILY
Qty: 90 TABLET | Refills: 3 | Status: SHIPPED | OUTPATIENT
Start: 2024-01-26

## 2024-01-31 ENCOUNTER — ANESTHESIA EVENT (OUTPATIENT)
Dept: SURGERY | Facility: AMBULATORY SURGERY CENTER | Age: 59
End: 2024-01-31
Payer: COMMERCIAL

## 2024-01-31 ASSESSMENT — EXTERNAL EXAM - RIGHT EYE: OD_EXAM: NORMAL

## 2024-01-31 NOTE — PROGRESS NOTES
# POD#0, status post cataract surgery, RIGHT eye  - No complaints.  Denies eye pain.    Impression  # Pseudophakia, RIGHT eye  - Uncomplicated phacoemulsification CE/IOL.  - POD0, good post-operative appearance, wounds sealed. IOP reasonable.    Plan  - Prednisolone acetate to operative eye: 4x/day for 1 week, 3x/day for 1 week, 2x/day for 1 week, 1x/day for 1 week.   - Ketorolac to operative eye: 4x/day for 1 week, 3x/day for 1 week, 2x/day for 1 week, 1x/day for 1 week.  - Moxifloxacin 3x/day to operative eye for 7 days.    - Eye protection at all times and eye shield at night for 1 week.  - Limited activities with no exercise or heavy lifting for 1 week.    - Instructed patient to contact us for decreasing vision, eye pain, new floaters or flashes of light or other concerning symptoms.  - Written instructions given  - Return to clinic for planned post-operative visit.       Ferny Santana MD  Ophthalmology, PGY-4    Attending Physician Attestation:  Complete documentation of historical and exam elements from today's encounter can be found in the full encounter summary report (not reduplicated in this progress note).  I personally obtained the chief complaint(s) and history of present illness.  I confirmed and edited as necessary the review of systems, past medical/surgical history, family history, social history, and examination findings as documented by others; and I examined the patient myself.  I personally reviewed the relevant tests, images, and reports as documented above.  I formulated and edited as necessary the assessment and plan and discussed the findings and management plan with the patient and family.  - Stan Roque MD

## 2024-02-01 ENCOUNTER — OFFICE VISIT (OUTPATIENT)
Dept: OPHTHALMOLOGY | Facility: CLINIC | Age: 59
End: 2024-02-01

## 2024-02-01 ENCOUNTER — ANESTHESIA (OUTPATIENT)
Dept: SURGERY | Facility: AMBULATORY SURGERY CENTER | Age: 59
End: 2024-02-01
Payer: COMMERCIAL

## 2024-02-01 ENCOUNTER — HOSPITAL ENCOUNTER (OUTPATIENT)
Facility: AMBULATORY SURGERY CENTER | Age: 59
Discharge: HOME | End: 2024-02-01
Attending: OPHTHALMOLOGY
Payer: COMMERCIAL

## 2024-02-01 VITALS
OXYGEN SATURATION: 99 % | RESPIRATION RATE: 16 BRPM | BODY MASS INDEX: 39.55 KG/M2 | HEIGHT: 72 IN | HEART RATE: 61 BPM | DIASTOLIC BLOOD PRESSURE: 72 MMHG | TEMPERATURE: 97.7 F | WEIGHT: 292 LBS | SYSTOLIC BLOOD PRESSURE: 128 MMHG

## 2024-02-01 DIAGNOSIS — H25.811 COMBINED FORM OF AGE-RELATED CATARACT, RIGHT EYE: Primary | ICD-10-CM

## 2024-02-01 DIAGNOSIS — Z98.890 POSTOPERATIVE EYE STATE: Primary | ICD-10-CM

## 2024-02-01 LAB — GLUCOSE BLDC GLUCOMTR-MCNC: 86 MG/DL (ref 70–99)

## 2024-02-01 PROCEDURE — 99024 POSTOP FOLLOW-UP VISIT: CPT | Mod: GC | Performed by: OPHTHALMOLOGY

## 2024-02-01 PROCEDURE — 82962 GLUCOSE BLOOD TEST: CPT | Performed by: PATHOLOGY

## 2024-02-01 PROCEDURE — 66982 XCAPSL CTRC RMVL CPLX WO ECP: CPT | Mod: RT

## 2024-02-01 PROCEDURE — 66982 XCAPSL CTRC RMVL CPLX WO ECP: CPT | Mod: RT | Performed by: OPHTHALMOLOGY

## 2024-02-01 DEVICE — LENS CC60WF 19.5 CLAREON UV ASPHERIC BICONVEX IOL: Type: IMPLANTABLE DEVICE | Site: EYE | Status: FUNCTIONAL

## 2024-02-01 RX ORDER — FENTANYL CITRATE 50 UG/ML
INJECTION, SOLUTION INTRAMUSCULAR; INTRAVENOUS PRN
Status: DISCONTINUED | OUTPATIENT
Start: 2024-02-01 | End: 2024-02-01

## 2024-02-01 RX ORDER — OXYCODONE HYDROCHLORIDE 5 MG/1
10 TABLET ORAL
Status: DISCONTINUED | OUTPATIENT
Start: 2024-02-01 | End: 2024-02-02 | Stop reason: HOSPADM

## 2024-02-01 RX ORDER — PROPARACAINE HYDROCHLORIDE 5 MG/ML
1 SOLUTION/ DROPS OPHTHALMIC
Status: DISCONTINUED | OUTPATIENT
Start: 2024-02-01 | End: 2024-02-01 | Stop reason: HOSPADM

## 2024-02-01 RX ORDER — SODIUM CHLORIDE, SODIUM LACTATE, POTASSIUM CHLORIDE, CALCIUM CHLORIDE 600; 310; 30; 20 MG/100ML; MG/100ML; MG/100ML; MG/100ML
INJECTION, SOLUTION INTRAVENOUS CONTINUOUS
Status: DISCONTINUED | OUTPATIENT
Start: 2024-02-01 | End: 2024-02-02 | Stop reason: HOSPADM

## 2024-02-01 RX ORDER — PROPARACAINE HYDROCHLORIDE 5 MG/ML
1 SOLUTION/ DROPS OPHTHALMIC ONCE
Status: COMPLETED | OUTPATIENT
Start: 2024-02-01 | End: 2024-02-01

## 2024-02-01 RX ORDER — MOXIFLOXACIN IN NACL,ISO-OS/PF 0.3MG/0.3
SYRINGE (ML) INTRAOCULAR PRN
Status: DISCONTINUED | OUTPATIENT
Start: 2024-02-01 | End: 2024-02-01 | Stop reason: HOSPADM

## 2024-02-01 RX ORDER — SODIUM CHLORIDE, SODIUM LACTATE, POTASSIUM CHLORIDE, CALCIUM CHLORIDE 600; 310; 30; 20 MG/100ML; MG/100ML; MG/100ML; MG/100ML
INJECTION, SOLUTION INTRAVENOUS CONTINUOUS
Status: DISCONTINUED | OUTPATIENT
Start: 2024-02-01 | End: 2024-02-01 | Stop reason: HOSPADM

## 2024-02-01 RX ORDER — MOXIFLOXACIN 5 MG/ML
1 SOLUTION/ DROPS OPHTHALMIC 4 TIMES DAILY
Qty: 3 ML | Refills: 0 | Status: SHIPPED | OUTPATIENT
Start: 2024-02-01 | End: 2024-09-25

## 2024-02-01 RX ORDER — DICLOFENAC SODIUM 1 MG/ML
1 SOLUTION/ DROPS OPHTHALMIC
Status: COMPLETED | OUTPATIENT
Start: 2024-02-01 | End: 2024-02-01

## 2024-02-01 RX ORDER — BALANCED SALT SOLUTION 6.4; .75; .48; .3; 3.9; 1.7 MG/ML; MG/ML; MG/ML; MG/ML; MG/ML; MG/ML
SOLUTION OPHTHALMIC PRN
Status: DISCONTINUED | OUTPATIENT
Start: 2024-02-01 | End: 2024-02-01 | Stop reason: HOSPADM

## 2024-02-01 RX ORDER — ONDANSETRON 4 MG/1
4 TABLET, ORALLY DISINTEGRATING ORAL EVERY 30 MIN PRN
Status: DISCONTINUED | OUTPATIENT
Start: 2024-02-01 | End: 2024-02-02 | Stop reason: HOSPADM

## 2024-02-01 RX ORDER — TIMOLOL MALEATE 5 MG/ML
SOLUTION/ DROPS OPHTHALMIC PRN
Status: DISCONTINUED | OUTPATIENT
Start: 2024-02-01 | End: 2024-02-01 | Stop reason: HOSPADM

## 2024-02-01 RX ORDER — ACETAMINOPHEN 325 MG/1
975 TABLET ORAL ONCE
Status: COMPLETED | OUTPATIENT
Start: 2024-02-01 | End: 2024-02-01

## 2024-02-01 RX ORDER — MOXIFLOXACIN 5 MG/ML
1 SOLUTION/ DROPS OPHTHALMIC
Status: COMPLETED | OUTPATIENT
Start: 2024-02-01 | End: 2024-02-01

## 2024-02-01 RX ORDER — ONDANSETRON 2 MG/ML
4 INJECTION INTRAMUSCULAR; INTRAVENOUS EVERY 30 MIN PRN
Status: DISCONTINUED | OUTPATIENT
Start: 2024-02-01 | End: 2024-02-02 | Stop reason: HOSPADM

## 2024-02-01 RX ORDER — TETRACAINE HYDROCHLORIDE 5 MG/ML
SOLUTION OPHTHALMIC PRN
Status: DISCONTINUED | OUTPATIENT
Start: 2024-02-01 | End: 2024-02-01 | Stop reason: HOSPADM

## 2024-02-01 RX ORDER — KETOROLAC TROMETHAMINE 5 MG/ML
1 SOLUTION OPHTHALMIC 4 TIMES DAILY
Qty: 10 ML | Refills: 0 | Status: SHIPPED | OUTPATIENT
Start: 2024-02-01 | End: 2024-09-25

## 2024-02-01 RX ORDER — LIDOCAINE HYDROCHLORIDE 10 MG/ML
INJECTION, SOLUTION EPIDURAL; INFILTRATION; INTRACAUDAL; PERINEURAL PRN
Status: DISCONTINUED | OUTPATIENT
Start: 2024-02-01 | End: 2024-02-01 | Stop reason: HOSPADM

## 2024-02-01 RX ORDER — SODIUM CHLORIDE, SODIUM LACTATE, POTASSIUM CHLORIDE, CALCIUM CHLORIDE 600; 310; 30; 20 MG/100ML; MG/100ML; MG/100ML; MG/100ML
INJECTION, SOLUTION INTRAVENOUS CONTINUOUS PRN
Status: DISCONTINUED | OUTPATIENT
Start: 2024-02-01 | End: 2024-02-01

## 2024-02-01 RX ORDER — PREDNISOLONE ACETATE 10 MG/ML
1-2 SUSPENSION/ DROPS OPHTHALMIC 4 TIMES DAILY
Qty: 10 ML | Refills: 0 | Status: SHIPPED | OUTPATIENT
Start: 2024-02-01 | End: 2024-09-25

## 2024-02-01 RX ORDER — LIDOCAINE 40 MG/G
CREAM TOPICAL
Status: DISCONTINUED | OUTPATIENT
Start: 2024-02-01 | End: 2024-02-01 | Stop reason: HOSPADM

## 2024-02-01 RX ORDER — OXYCODONE HYDROCHLORIDE 5 MG/1
5 TABLET ORAL
Status: DISCONTINUED | OUTPATIENT
Start: 2024-02-01 | End: 2024-02-02 | Stop reason: HOSPADM

## 2024-02-01 RX ORDER — CYCLOPENTOLAT/TROPIC/PHENYLEPH 1%-1%-2.5%
1 DROPS (EA) OPHTHALMIC (EYE)
Status: COMPLETED | OUTPATIENT
Start: 2024-02-01 | End: 2024-02-01

## 2024-02-01 RX ADMIN — DICLOFENAC SODIUM 1 DROP: 1 SOLUTION/ DROPS OPHTHALMIC at 10:55

## 2024-02-01 RX ADMIN — SODIUM CHLORIDE, SODIUM LACTATE, POTASSIUM CHLORIDE, CALCIUM CHLORIDE: 600; 310; 30; 20 INJECTION, SOLUTION INTRAVENOUS at 11:11

## 2024-02-01 RX ADMIN — Medication 1 DROP: at 10:55

## 2024-02-01 RX ADMIN — Medication 1 DROP: at 10:45

## 2024-02-01 RX ADMIN — DICLOFENAC SODIUM 1 DROP: 1 SOLUTION/ DROPS OPHTHALMIC at 10:48

## 2024-02-01 RX ADMIN — Medication 1 DROP: at 10:48

## 2024-02-01 RX ADMIN — MOXIFLOXACIN 1 DROP: 5 SOLUTION/ DROPS OPHTHALMIC at 10:55

## 2024-02-01 RX ADMIN — MOXIFLOXACIN 1 DROP: 5 SOLUTION/ DROPS OPHTHALMIC at 10:48

## 2024-02-01 RX ADMIN — FENTANYL CITRATE 50 MCG: 50 INJECTION, SOLUTION INTRAMUSCULAR; INTRAVENOUS at 11:15

## 2024-02-01 RX ADMIN — MOXIFLOXACIN 1 DROP: 5 SOLUTION/ DROPS OPHTHALMIC at 10:45

## 2024-02-01 RX ADMIN — SODIUM CHLORIDE, SODIUM LACTATE, POTASSIUM CHLORIDE, CALCIUM CHLORIDE: 600; 310; 30; 20 INJECTION, SOLUTION INTRAVENOUS at 10:44

## 2024-02-01 RX ADMIN — DICLOFENAC SODIUM 1 DROP: 1 SOLUTION/ DROPS OPHTHALMIC at 10:44

## 2024-02-01 RX ADMIN — PROPARACAINE HYDROCHLORIDE 1 DROP: 5 SOLUTION/ DROPS OPHTHALMIC at 10:44

## 2024-02-01 ASSESSMENT — TONOMETRY
IOP_METHOD: TONOPEN
OD_IOP_MMHG: 11

## 2024-02-01 ASSESSMENT — SLIT LAMP EXAM - LIDS: COMMENTS: NORMAL

## 2024-02-01 ASSESSMENT — VISUAL ACUITY
METHOD: SNELLEN - LINEAR
OD_SC: 20/32

## 2024-02-01 NOTE — OP NOTE
PREOPERATIVE DIAGNOSIS:   1.  2.  3. Combined form of age-related cataract, right eye   Miosis  Intraoperative floppy iris    Right eye   POSTOPERATIVE DIAGNOSIS: Same   PROCEDURES:   1. Complex Cataract extraction with intraocular lens implant Right eye.  SURGEON: Stan Roque M.D.  ASSISTANT: Ferny Santana MD  INDICATIONS: The patient Camacho Bhagat presented to the eye clinic with decreased vision secondary to cataract in the Right eye. The risks, benefits and alternatives to cataract extraction were discussed. The patient elected to proceed. All questions were answered to the patient's satisfaction.   DESCRIPTION OF PROCEDURE:   Prior to the procedure, appropriate cardiac and respiratory monitors were applied to the patient.  In the pre-operative holding area, a drop of topical tetracaine was placed in the operative eye.  The patient was brought to the operating room.  With adequate anesthesia, the Right eye was prepped and draped in the usual sterile fashion. A lid speculum was placed, and the operating microscope was rotated into position. A surgical pause was carried out to identify with all members of the surgical team the correct surgical site. A paracentesis was created.  Through this limbal paracentesis, the anterior chamber was filled with preservative-free lidocaine followed by shugarcaine and viscoelastic.  A temporal wound was created at the limbus using a 2.6 mm blade. A 7.0 Classic Malyugin Ring was inserted and afixed to the iris. A capsulorrhexis was initiated using a bent 25-gauge needle and was completed in continuous and circular fashion using the capsulorrhexis forceps. The lens nucleus was hydrodissected using balanced salt solution.  The lens nucleus was rotated and removed using phacoemulsification in a stop and chop technique.  Residual cortical material was removed using irrigation-aspiration.  The capsular bag was reinflated to its maximal extent with cohesive viscoelastic.  A 19.5  diopter [id number] was inserted into the capsular bag.  The lens power selected was reviewed using the intraocular lens power measurements that were obtained preoperatively to confirm that the correct lens was selected for the desired post-operative refractive state. The Malyugin ring was removed from the eye using the .  The residual viscoelastic was removed in its entirety, the wound were hydrated and found to be self-sealing.  Intracameral moxifloxacin was administered. Tactile pressure was confirmed to be in a normal range.  The lid speculum was removed, a drop of timolol was administered, and a shield was applied.  The patient tolerated the procedure well, and there were no complications.    PLAN: The patient will be discharged to home and will follow up today  EBL:  None  Complications:  None  Implant Name Type Inv. Item Serial No.  Lot No. LRB No. Used Action   LENS CC60WF 19.5 [employer] UV ASPHERIC BICONVEX IOL - [id number] Lens/Eye Implant LENS [id number] 19.5 [employer] UV ASPHERIC BICONVEX IOL [id number] [facility] [facility]  Right 1 Implanted      Attending Physician Procedure Attestation: I was present for the entire procedure and was available to assist as needed-Stan Roque MD

## 2024-02-01 NOTE — ANESTHESIA PREPROCEDURE EVALUATION
Anesthesia Pre-Procedure Evaluation    Patient: Camacho Bhagat   MRN: [id number] : 1965        Procedure : Procedure(s):  RIGHT EYE PHACOEMULSIFICATION, CATARACT, WITH INTRAOCULAR LENS IMPLANT          Past Medical History:   Diagnosis Date    Cancer (H) 12/15    Stage 4 liver cancer    Diabetes type 2, controlled (H) 11/10/2016    Esophageal reflux     Fibromyalgia 2009    dx with Dr Benitez( Rheum)    Gangrene of finger (H) 2017    H/O deep venous thrombosis 2001    Permanent IVC filter in place.    H/O CASTAÑEDA (nonalcoholic steatohepatitis)     H/O Pneumonia, organism unspecified(486) 10/2001    Included ARDS, sepsis, and  acute renal failure; hospitalized, required tracheostomy placement.    H/O: HTN (hypertension) 2001    No longer prescribed antihypertensive medication.      History of hepatocellular carcinoma     History of liver transplant (H)     History of obstructive sleep apnea     No longer wears CPAP since losing approximately 200 pounds with his liver transplant and its complications.      HLD (hyperlipidemia)     Hypertension     Ischemia of both lower extremities 2017    Distal ischemia due to shock/high pressor requirements    Liver transplant rejection (H) 2018    Neutropenic colitis  (H24) 2017    Osteoarthritis     Presence of PERMANENT IVC filter     Rheumatoid arthritis(714.0)     remission for many years      Past Surgical History:   Procedure Laterality Date    ARTHROSCOPY KNEE WITH MENISCAL REPAIR Right     Right knee arthroscopy    BENCH LIVER N/A 2017    Procedure: BENCH LIVER;  Surgeon: Jovan Tran MD;  Location: [facility]    BIOPSY  2017    Liver    CHOLECYSTECTOMY      COLONOSCOPY N/A 2017    Procedure: COMBINED COLONOSCOPY, SINGLE OR MULTIPLE BIOPSY/POLYPECTOMY BY BIOPSY;  Colonoscopy;  Surgeon: Izaiah Montes MD;  Location: [facility]    COLONOSCOPY N/A 10/24/2022    Procedure: COLONOSCOPY, WITH POLYPECTOMY AND BIOPSY;   Surgeon: Abril Mcneill MD;  Location: [facility]    ENDOSCOPIC RETROGRADE CHOLANGIOPANCREATOGRAM N/A 05/22/2018    Procedure: COMBINED ENDOSCOPIC RETROGRADE CHOLANGIOPANCREATOGRAPHY, PLACE TUBE/STENT;  Endoscopic Retrograde Cholangiopancreatography with sphincterotomy and pancreatic duct stent placement;  Surgeon: Zay Gaitan MD;  Location: [facility]    ENT SURGERY      Repair of deviated septum    ESOPHAGOSCOPY, GASTROSCOPY, DUODENOSCOPY (EGD), COMBINED N/A 08/04/2016    Procedure: COMBINED ESOPHAGOSCOPY, GASTROSCOPY, DUODENOSCOPY (EGD), BIOPSY SINGLE OR MULTIPLE;  Surgeon: Trent Pederson MD;  Location:  GI    ESOPHAGOSCOPY, GASTROSCOPY, DUODENOSCOPY (EGD), COMBINED N/A 08/27/2017    Procedure: COMBINED ESOPHAGOSCOPY, GASTROSCOPY, DUODENOSCOPY (EGD);;  Surgeon: Los Wynn MD;  [facility]    ESOPHAGOSCOPY, GASTROSCOPY, DUODENOSCOPY (EGD), COMBINED N/A 08/17/2023    Procedure: Esophagoscopy, gastroscopy, duodenoscopy (EGD), combined;  Surgeon: Trent Villanueva MD;  Location: [facility]    INCISION AND DRAINAGE ABDOMEN WASHOUT, COMBINED Right 02/14/2018    Procedure: COMBINED INCISION AND DRAINAGE ABDOMEN WASHOUT;  COMBINED INCISION AND DRAINAGE right ABDOMEN flank;  Surgeon: Sara Dinh MD;  Location: [facility]    LAPAROTOMY EXPLORATORY N/A 07/04/2017    Procedure: LAPAROTOMY EXPLORATORY;  Exploratory Laparotomy, washout;  Surgeon: Tip Zhang MD;  Location: [facility]    LAPAROTOMY EXPLORATORY N/A 07/05/2017    Procedure: LAPAROTOMY EXPLORATORY;  Exploratory Laparotomy, Washout with closure.;  Surgeon: Tip Zhang MD;  [facility]: [facility]    LAPAROTOMY EXPLORATORY N/A 07/26/2017    Procedure: LAPAROTOMY EXPLORATORY;  Exploratory Laparotomy, Right angelique-colectomy, end ileostomy, mucosal fistula, partial omentectomy;  Surgeon: Sara Dinh MD;  Location:  OR    [facility]      ORTHOPEDIC SURGERY Right     Repair of dislocated wrist.      RELEASE TRIGGER FINGER Right  11/10/2017    Procedure: RELEASE TRIGGER FINGER;  Debride, V-Y Flap Right Index Finger;  Surgeon: Momo Noonan MD;  [facility]    TAKEDOWN ILEOSTOMY N/A 2018    Procedure: TAKEDOWN ILEOSTOMY;  Exploratory Laparotomy, Lysis of Adhesions, Takedown Of End Ileostomy, Takedown of mucocutaneous fistula, ileocolic resection  And Colorectal Anastomosis, Primary repair of Ventral Hernia, Anesthesia Block ;  Surgeon: Sara Dinh MD;  Location: [facility]    TRACHEOSTOMY  2001    During hospitalization for pneumonia.      TRANSPLANT LIVER RECIPIENT  DONOR N/A 2017    Procedure: TRANSPLANT LIVER RECIPIENT  DONOR;  Surgeon: Jovan Tran MD;  Location: [facility]      Allergies   Allergen Reactions    Erythromycin GI Disturbance    Losartan Other (See Comments)     Consistently develops hyperkalemia    Vioxx      Nausea, vomiting      Social History     Tobacco Use    Smoking status: Former     Packs/day: 0.50     Years: 1.00     Additional pack years: 0.00     Total pack years: 0.50     Types: Cigarettes, Cigars, Dip, chew, snus or snuff     Start date: 1987     Quit date: 1987     Years since quittin.6    Smokeless tobacco: Former     Types: Chew     Quit date: 10/8/2015    Tobacco comments:     1 Cigar once every other month.   Substance Use Topics    Alcohol use: Not Currently     Comment: No alcohol since liver transplant.        Wt Readings from Last 1 Encounters:   24 132.6 kg (292 lb 6.4 oz)        Anesthesia Evaluation   Pt has had prior anesthetic. Type: General.        ROS/MED HX  ENT/Pulmonary:     (+) sleep apnea, uses CPAP,                                      Neurologic:       Cardiovascular:     (+) Dyslipidemia hypertension- -   -  - -                                      METS/Exercise Tolerance:     Hematologic:     (+) History of blood clots,    pt is not anticoagulated, anemia,          Musculoskeletal:   (+)  arthritis,              GI/Hepatic:     (+) GERD,          hepatitis  liver disease (liver transplant 3/4/17),       Renal/Genitourinary:     (+) renal disease, type: CRI,            Endo:     (+)  type II DM,             Obesity,       Psychiatric/Substance Use:       Infectious Disease:       Malignancy:       Other:            Physical Exam    Airway        Mallampati: III   TM distance: > 3 FB   Neck ROM: full   Mouth opening: < 3 cm    Respiratory Devices and Support         Dental       (+) Modest Abnormalities - crowns, retainers, 1 or 2 missing teeth      Cardiovascular   cardiovascular exam normal       Rhythm and rate: regular and normal     Pulmonary   pulmonary exam normal        breath sounds clear to auscultation           OUTSIDE LABS:  CBC:   Lab Results   Component Value Date    WBC 7.2 01/09/2024    WBC 6.1 11/30/2023    HGB 11.9 (L) 01/09/2024    HGB 10.3 (L) 11/30/2023    HCT 34.5 (L) 01/09/2024    HCT 30.3 (L) 11/30/2023     (L) 01/09/2024     (L) 11/30/2023     BMP:   Lab Results   Component Value Date     01/09/2024     11/30/2023    POTASSIUM 5.3 01/09/2024    POTASSIUM 4.8 11/30/2023    CHLORIDE 101 01/09/2024    CHLORIDE 104 11/30/2023    CO2 23 01/09/2024    CO2 21 (L) 11/30/2023    BUN 36.2 (H) 01/09/2024    BUN 47.1 (H) 11/30/2023    CR 1.65 (H) 01/09/2024    CR 2.09 (H) 11/30/2023    GLC 87 01/09/2024     (H) 11/30/2023     COAGS:   Lab Results   Component Value Date    PTT 39 (H) 07/26/2017    INR 0.89 06/01/2018    FIBR 427 (H) 10/25/2017     POC:   Lab Results   Component Value Date     (H) 06/01/2018     HEPATIC:   Lab Results   Component Value Date    ALBUMIN 4.3 01/09/2024    PROTTOTAL 7.1 01/09/2024    ALT 21 01/09/2024    AST 26 01/09/2024    GGT 58 07/11/2017    ALKPHOS 162 (H) 01/09/2024    BILITOTAL 0.5 01/09/2024    CLAUDIA <10 (L) 07/07/2017     OTHER:   Lab Results   Component Value Date    PH 7.33 (L) 08/10/2017    LACT 1.7 02/12/2018    A1C 6.2 (H)  "11/30/2023    JACOB 9.3 01/09/2024    PHOS 3.7 01/09/2024    MAG 1.8 01/09/2024    LIPASE 161 05/22/2018    AMYLASE 70 05/22/2018    TSH 3.12 04/19/2023    T4 1.23 04/11/2011    CRP 3.3 08/21/2020    SED 22 (H) 08/21/2020       Anesthesia Plan    ASA Status:  3    NPO Status:  NPO Appropriate    Anesthesia Type: MAC.     - Reason for MAC: straight local not clinically adequate   Induction: Intravenous.   Maintenance: TIVA.        Consents    Anesthesia Plan(s) and associated risks, benefits, and realistic alternatives discussed. Questions answered and patient/representative(s) expressed understanding.     - Discussed: Risks, Benefits and Alternatives for BOTH SEDATION and the PROCEDURE were discussed     - Discussed with:  Patient            Postoperative Care    Pain management: IV analgesics, Oral pain medications, Multi-modal analgesia.   PONV prophylaxis: Ondansetron (or other 5HT-3), Dexamethasone or Solumedrol, Background Propofol Infusion     Comments:               Lyle Montes MD    I have reviewed the pertinent notes and labs in the chart from the past 30 days and (re)examined the patient.  Any updates or changes from those notes are reflected in this note.     # Hyponatremia: Lowest Na = 135 mmol/L in last 30 days, will monitor as appropriate         # Thrombocytopenia: Lowest platelets = 108 in last 30 days, will monitor for bleeding  # Obesity: Estimated body mass index is 39.65 kg/m  as calculated from the following:    Height as of 1/24/24: 1.829 m (6' 0.01\").    Weight as of 1/24/24: 132.6 kg (292 lb 6.4 oz).      "

## 2024-02-01 NOTE — ANESTHESIA POSTPROCEDURE EVALUATION
Patient: Camacho Bhagat    Procedure: Procedure(s):  RIGHT EYE PHACOEMULSIFICATION, COMPLEX CATARACT, WITH INTRAOCULAR LENS IMPLANT       Anesthesia Type:  MAC    Note:  Disposition: Outpatient   Postop Pain Control: Uneventful            Sign Out: Well controlled pain   PONV: No   Neuro/Psych: Uneventful            Sign Out: Acceptable/Baseline neuro status   Airway/Respiratory: Uneventful            Sign Out: Acceptable/Baseline resp. status   CV/Hemodynamics: Uneventful            Sign Out: Acceptable CV status; No obvious hypovolemia; No obvious fluid overload   Other NRE: NONE   DID A NON-ROUTINE EVENT OCCUR?            Last vitals:  Vitals Value Taken Time   /72 02/01/24 1215   Temp 36.5  C (97.7  F) 02/01/24 1201   Pulse     Resp 16 02/01/24 1215   SpO2 99 % 02/01/24 1215       Electronically Signed By: Lyle Montes MD  February 1, 2024  1:55 PM

## 2024-02-01 NOTE — DISCHARGE INSTRUCTIONS
[facility]  Home Care Following Anesthesia  For 24 hours after surgery:  Get plenty of rest.  A responsible adult must stay with you for at least 24 hours after you leave the surgery center.  Do not drive or use heavy equipment.  If you have weakness or tingling, don't drive or use heavy equipment until this feeling goes away.   Do not drink alcohol.   Avoid strenuous or risky activities.  Ask for help when climbing stairs.  You may feel lightheaded.  IF so, sit for a few minutes before standing.  Have someone help you get up.   If you have nausea (feel sick to your stomach): Drink only clear liquids such as apple juice, ginger ale, broth or 7-Up.  Rest may also help.  Be sure to drink enough fluids.  Move to a regular diet as you feel able.   You may have a slight fever.  Call the doctor if your fever is over 100 F (37.7 C) (taken under the tongue) or lasts longer than 24 hours.  You may have a dry mouth, a sore throat, muscle aches or trouble sleeping. These should go away after 24 hours.  Do not make important or legal decisions.   It is recommended to avoid smoking.               Tips for taking pain medications  To get the best pain relief possible, remember these points:  Take pain medications as directed, before pain becomes severe.  Pain medication can upset your stomach: taking it with food may help.  Constipation is a common side effect of pain medication. Drink plenty of  fluids.  Eat foods high in fiber. Take a stool softener if recommended by your doctor or pharmacist.  Do not drink alcohol, drive or operate machinery while taking pain medications.  Ask about other ways to control pain, such as with heat, ice or relaxation.    Tylenol/Acetaminophen Consumption    If you feel your pain relief is insufficient, you may take Tylenol/Acetaminophen in addition to your narcotic pain medication.   Be careful not to exceed 4,000 mg of Tylenol/Acetaminophen in a 24 hour  period from all sources.  If you are taking extra strength Tylenol/acetaminophen (500 mg), the maximum dose is 8 tablets in 24 hours.  If you are taking regular strength acetaminophen (325 mg), the maximum dose is 12 tablets in 24 hours.      Call a doctor for any of the following:  Signs of infection (fever, growing tenderness at the surgery site, a large amount of drainage or bleeding, severe pain, foul-smelling drainage, redness, swelling).  It has been over 8 to 10 hours since surgery and you are still not able to urinate (pass water).  Headache for over 24 hours.  Signs of Covid-19 infection (temperature over 100 degrees, shortness of breath, cough, loss of taste/smell, generalized body aches, persistent headache, chills, sore throat, nausea/vomiting/diarrhea)  Your doctor is:  Dr. Stan Roque, Ophthalmology: [phone]                    Or dial [phone] and ask for the resident on call for:  Ophthalmology  For emergency care, call the:  [facility]:  [phone] (TTY for hearing impaired: [phone])

## 2024-02-01 NOTE — ANESTHESIA CARE TRANSFER NOTE
Patient: Camacho Bhagat    Procedure: Procedure(s):  RIGHT EYE PHACOEMULSIFICATION, COMPLEX CATARACT, WITH INTRAOCULAR LENS IMPLANT       Diagnosis: Combined forms of age-related cataract of both eyes [H25.813]  Diagnosis Additional Information: No value filed.    Anesthesia Type:   MAC     Note:    Oropharynx: spontaneously breathing  Level of Consciousness: awake  Oxygen Supplementation: room air    Independent Airway: airway patency satisfactory and stable  Dentition: dentition unchanged  Vital Signs Stable: post-procedure vital signs reviewed and stable  Report to RN Given: handoff report given  Patient transferred to: Phase II    Handoff Report: Identifed the Patient, Identified the Reponsible Provider, Reviewed the pertinent medical history, Discussed the surgical course, Reviewed Intra-OP anesthesia mangement and issues during anesthesia, Set expectations for post-procedure period and Allowed opportunity for questions and acknowledgement of understanding      Vitals:  Vitals Value Taken Time   /61    Temp 36.5  C (97.7  F) 02/01/24 1201   Pulse 54    Resp 16 02/01/24 1201   SpO2 97 % 02/01/24 1201       Electronically Signed By: LAMIN Pritchard CRNA  February 1, 2024  12:04 PM

## 2024-02-05 ENCOUNTER — OFFICE VISIT (OUTPATIENT)
Dept: ENDOCRINOLOGY | Facility: CLINIC | Age: 59
End: 2024-02-05
Payer: COMMERCIAL

## 2024-02-05 VITALS
WEIGHT: 290 LBS | BODY MASS INDEX: 39.28 KG/M2 | SYSTOLIC BLOOD PRESSURE: 168 MMHG | HEIGHT: 72 IN | DIASTOLIC BLOOD PRESSURE: 74 MMHG | HEART RATE: 64 BPM | OXYGEN SATURATION: 98 %

## 2024-02-05 DIAGNOSIS — N18.31 CHRONIC KIDNEY DISEASE, STAGE 3A (H): ICD-10-CM

## 2024-02-05 DIAGNOSIS — E11.21 TYPE 2 DIABETES MELLITUS WITH DIABETIC NEPHROPATHY, WITH LONG-TERM CURRENT USE OF INSULIN (H): Primary | ICD-10-CM

## 2024-02-05 DIAGNOSIS — E11.42 TYPE 2 DIABETES MELLITUS WITH DIABETIC POLYNEUROPATHY, WITH LONG-TERM CURRENT USE OF INSULIN (H): ICD-10-CM

## 2024-02-05 DIAGNOSIS — Z79.4 TYPE 2 DIABETES MELLITUS WITH DIABETIC NEPHROPATHY, WITH LONG-TERM CURRENT USE OF INSULIN (H): Primary | ICD-10-CM

## 2024-02-05 DIAGNOSIS — E78.00 HYPERCHOLESTEROLEMIA: ICD-10-CM

## 2024-02-05 DIAGNOSIS — Z79.4 TYPE 2 DIABETES MELLITUS WITH DIABETIC POLYNEUROPATHY, WITH LONG-TERM CURRENT USE OF INSULIN (H): ICD-10-CM

## 2024-02-05 PROCEDURE — 99214 OFFICE O/P EST MOD 30 MIN: CPT | Performed by: INTERNAL MEDICINE

## 2024-02-05 RX ORDER — INSULIN LISPRO 200 [IU]/ML
INJECTION, SOLUTION SUBCUTANEOUS
Qty: 72 ML | Refills: 3 | Status: SHIPPED | OUTPATIENT
Start: 2024-02-05 | End: 2024-09-30

## 2024-02-05 RX ORDER — PROCHLORPERAZINE 25 MG/1
SUPPOSITORY RECTAL
Qty: 9 EACH | Refills: 3 | Status: SHIPPED | OUTPATIENT
Start: 2024-02-05 | End: 2024-09-30

## 2024-02-05 RX ORDER — INSULIN PMP CART,AUT,G6/7,CNTR
1 EACH SUBCUTANEOUS SEE ADMIN INSTRUCTIONS
Qty: 45 EACH | Refills: 3 | Status: SHIPPED | OUTPATIENT
Start: 2024-02-05 | End: 2024-09-30

## 2024-02-05 RX ORDER — PROCHLORPERAZINE 25 MG/1
SUPPOSITORY RECTAL
Qty: 1 EACH | Refills: 3 | Status: SHIPPED | OUTPATIENT
Start: 2024-02-05 | End: 2024-09-30

## 2024-02-05 ASSESSMENT — PAIN SCALES - GENERAL: PAINLEVEL: SEVERE PAIN (7)

## 2024-02-05 NOTE — PROGRESS NOTES
Camacho Bhagat is a 58 year old male with PMHx of CASTAÑEDA/HCC s/p liver transplant (3/2017), fibromyalgia, CKD, DVT, HTN, RONNIE, OA, who presents for follow-up of type 2 DM, diagnosed in 2002.  Oral meds first, later placed on insulin.     Related history: h/o CASTAÑEDA cirrhosis and HCC s/p liver transplant 3/2017. Course complicated by acute abdomen/neutropenic fever requiring right hemicolectomy and colostomy Fall 2017. He later underwent colostomy takedown 1/5/18 which was complicated by abdominal wall abscess at a drain site. During this course, he has also developed excessive proteinuria.    Hemoglobin A1c was 6.2% on 11/30/23, in the context of anemia.  I reviewed both insulin pump and sensor records.    Insulin pump (Omnipod 5, May 2023) settings:  Basal rate   12 AM 1.5 units/h  8 AM 1.45 units/h  2 PM 1.4 units/h   6 PM 1.45 units/h   9 PM 1.5 units/h  Insulin to carbohydrate ratio 1 unit per 11 g   Sensitivity 25   Blood glucose correction threshold 120  Glucose target range 110  Active insulin time 3 hours    He uses Humalog 200 pens to fill the pump reservoir.     Total daily insulin dose is 74 units, of which 60% is basal insulin.  On the sensor, 59% of the glucose numbers are within target of , 6% are above 250 and there are no significant hypoglycemic episodes.  The average glucose on the sensor is 175 with a standard deviation of 46 and a coefficient of variation 26.5%.  The pump is used in the automated mode 99% There are approximately 2.6 entries/day for CHO intake.  The sensor reveals a trend of postprandial hyperglycemia, which the patient tries to correct by administering higher correction dosages than what is recommended by the pump.    Treatment with empagliflozin was started on 1/10/24, at 10 mg daily, by nephrology.  He recently had to discontinue it prior to the cataract surgery and he restarted taking it yesterday.    In the past, he tried Victoza and he was not able to tolerate it due to  projectile vomiting.    Related meds:  Prednisone started for transplant and the dose remains 2.5 mg per day.     Complications  + chronic complications.      1. Retinopathy: No known eye disease  Last eye exam was in 2/24.  S/P right cataract surgery.      2.  Nephropathy: yes. CKD IIIa . He has significant proteinuria and f/up with nephrology.  Recent GFR in the 40s. Discontinued losartan around 3/2019 due to hyperkalemia. It was restarted at 25 mg daily.      3. Neuropathy.  Gabapentin was prescribed to control the neuropathic pain in the left foot, and back pain which started over 10 years ago.  He takes 800 mg TID. No ulcers or infection. No amputation.  He has bilateral foot pain.  Follows up with the podiatry in the last appointment was in 11/23.      4. Lipids   1/9/24: LDL cholesterol 77, HDL cholesterol 41,      In general, he is able to recognize the hypoglycemic episodes: He feels flushed, warm and dizzy.  The lowest blood glucose he remembers experiencing was in the 50s.  He does not have glucagon injection at home.     LS XRay from 9/13/22 revealed an L1 vertebral compression fracture.  The fracture was not mentioned on the follow-up MRI.   The patient has a longstanding history of back pain, which started in 1993.  The lumbar spine x-ray was recommended as he complained of hip pain, for which he has been receiving intra-articular steroid injections.    DXA 2/2020  The most negative and valid T-score of -1.8 at the level of the right femoral neck. Compared with baseline 2019 exam there was a significant decrease of BMD at the level of the lumbar spine, left total hip and right total hip.  (-2.6%; -17.4%; -18.2%).   Vitamin D 43 on 2/1/24. He has been taking 5000 U daily.   PTH 31 on 9/27/22     Past Medical History  Past Medical History:   Diagnosis Date    Cancer (H) 12/15    Stage 4 liver cancer    Diabetes type 2, controlled (H) 11/10/2016    Esophageal reflux     Fibromyalgia 01/2009    dx  with Dr Benitez( Rhe[facility])    Gangrene of finger (H) 08/25/2017    H/O deep venous thrombosis 11/2001    Permanent IVC filter in place.    H/O CASTAÑEDA (nonalcoholic steatohepatitis)     H/O Pneumonia, organism unspecified(486) 10/2001    Included ARDS, sepsis, and  acute renal failure; hospitalized, required tracheostomy placement.    H/O: HTN (hypertension) 11/2001    No longer prescribed antihypertensive medication.      History of hepatocellular carcinoma     History of liver transplant (H)     History of obstructive sleep apnea     No longer wears CPAP since losing approximately 200 pounds with his liver transplant and its complications.      HLD (hyperlipidemia)     Hypertension     Ischemia of both lower extremities 08/25/2017    Distal ischemia due to shock/high pressor requirements    Liver transplant rejection (H) 06/11/2018    Neutropenic colitis  (H24) 07/04/2017    Osteoarthritis     Presence of PERMANENT IVC filter     Rheumatoid arthritis(714.0)     remission for many years   Osteopenia 2020    Past Surgical History  Past Surgical History:   Procedure Laterality Date    ARTHROSCOPY KNEE WITH MENISCAL REPAIR Right 2008    Right knee arthroscopy    BENCH LIVER N/A 03/04/2017    Procedure: BENCH LIVER;  Surgeon: Jovan Tran MD;  Location: [facility]    BIOPSY  5/2017    Liver    CHOLECYSTECTOMY      COLONOSCOPY N/A 07/21/2017    Procedure: COMBINED COLONOSCOPY, SINGLE OR MULTIPLE BIOPSY/POLYPECTOMY BY BIOPSY;  Colonoscopy;  Surgeon: Izaiah Montes MD;  Location:  GI    COLONOSCOPY N/A 10/24/2022    Procedure: COLONOSCOPY, WITH POLYPECTOMY AND BIOPSY;  Surgeon: Abril Mcneill MD;  Location:  GI    ENDOSCOPIC RETROGRADE CHOLANGIOPANCREATOGRAM N/A 05/22/2018    Procedure: COMBINED ENDOSCOPIC RETROGRADE CHOLANGIOPANCREATOGRAPHY, PLACE TUBE/STENT;  Endoscopic Retrograde Cholangiopancreatography with sphincterotomy and pancreatic duct stent placement;  Surgeon: Zay Gaitan MD;  [facility]    ENT SURGERY      Repair of deviated septum    ESOPHAGOSCOPY, GASTROSCOPY, DUODENOSCOPY (EGD), COMBINED N/A 08/04/2016    Procedure: COMBINED ESOPHAGOSCOPY, GASTROSCOPY, DUODENOSCOPY (EGD), BIOPSY SINGLE OR MULTIPLE;  Surgeon: Trent Pederson MD;  Location: [facility]    ESOPHAGOSCOPY, GASTROSCOPY, DUODENOSCOPY (EGD), COMBINED N/A 08/27/2017    Procedure: COMBINED ESOPHAGOSCOPY, GASTROSCOPY, DUODENOSCOPY (EGD);;  Surgeon: Los Wynn MD;  Location: [facility]    ESOPHAGOSCOPY, GASTROSCOPY, DUODENOSCOPY (EGD), COMBINED N/A 08/17/2023    Procedure: Esophagoscopy, gastroscopy, duodenoscopy (EGD), combined;  Surgeon: Trent Villanueva MD;  Location: [facility]    INCISION AND DRAINAGE ABDOMEN WASHOUT, COMBINED Right 02/14/2018    Procedure: COMBINED INCISION AND DRAINAGE ABDOMEN WASHOUT;  COMBINED INCISION AND DRAINAGE right ABDOMEN flank;  Surgeon: Sara Dinh MD;  Location: [facility]    LAPAROTOMY EXPLORATORY N/A 07/04/2017    Procedure: LAPAROTOMY EXPLORATORY;  Exploratory Laparotomy, washout;  Surgeon: Tip Zhang MD;  [facility]    LAPAROTOMY EXPLORATORY N/A 07/05/2017    Procedure: LAPAROTOMY EXPLORATORY;  Exploratory Laparotomy, Washout with closure.;  Surgeon: Tip Zhang MD;  [facility]    LAPAROTOMY EXPLORATORY N/A 07/26/2017    Procedure: LAPAROTOMY EXPLORATORY;  Exploratory Laparotomy, Right angelique-colectomy, end ileostomy, mucosal fistula, partial omentectomy;  Surgeon: Sara Dinh MD;  Location: [facility]    LASIK      ORTHOPEDIC SURGERY Right     Repair of dislocated wrist.      PHACOEMULSIFICATION CLEAR CORNEA WITH STANDARD INTRAOCULAR LENS IMPLANT Right 2/1/2024    Procedure: RIGHT EYE PHACOEMULSIFICATION, COMPLEX CATARACT, WITH INTRAOCULAR LENS IMPLANT;  Surgeon: Stan Roque MD;  Location: [facility]    RELEASE TRIGGER FINGER Right 11/10/2017    Procedure: RELEASE TRIGGER FINGER;  Debride, V-Y Flap Right Index Finger;  Surgeon: Saran  Momo Sibley MD;  [facility]    TAKEDOWN ILEOSTOMY N/A 2018    Procedure: TAKEDOWN ILEOSTOMY;  Exploratory Laparotomy, Lysis of Adhesions, Takedown Of End Ileostomy, Takedown of mucocutaneous fistula, ileocolic resection  And Colorectal Anastomosis, Primary repair of Ventral Hernia, Anesthesia Block ;  Surgeon: Sara Dinh MD;  Location: [facility]    TRACHEOSTOMY  2001    During hospitalization for pneumonia.      TRANSPLANT LIVER RECIPIENT  DONOR N/A 2017    Procedure: TRANSPLANT LIVER RECIPIENT  DONOR;  Surgeon: Jovan Tran MD;  Location: [facility]        Medications    Current Outpatient Medications:     alcohol swab prep pads, Use to swab area of injection/francisca as directed., Disp: 100 each, Rfl: 3    alpha-lipoic acid 600 MG capsule, Take 600 mg by mouth, Disp: , Rfl:     amLODIPine (NORVASC) 10 MG tablet, Take 1 tablet (10 mg) by mouth daily, Disp: 90 tablet, Rfl: 3    augmented betamethasone dipropionate (DIPROLENE-AF) 0.05 % external ointment, Apply twice daily as needed for rash on the legs, Disp: 45 g, Rfl: 11    blood glucose (NO BRAND SPECIFIED) lancets standard, Use to test blood sugar 1 times daily or as directed., Disp: 100 lancet, Rfl: 3    blood glucose (NO BRAND SPECIFIED) test strip, Use to test blood sugar 1 times daily or as directed., Disp: 50 strip, Rfl: 11    blood glucose calibration (NO BRAND SPECIFIED) solution, Use to calibrate meter., Disp: 1 Bottle, Rfl: 3    Blood Glucose Monitoring Suppl (CONTOUR BLOOD GLUCOSE SYSTEM) w/Device KIT, For use with the Omnipod Dash insulin pump, Disp: 1 kit, Rfl: 1    cholecalciferol (VITAMIN D3) 1000 UNIT tablet, Take 1 tablet (1,000 Units) by mouth daily (Patient taking differently: Take 1,000 Units by mouth every morning), Disp: 100 tablet, Rfl: 3    Continuous Blood Gluc Sensor (DEXCOM G6 SENSOR) MISC, Change every 10 days., Disp: 9 each, Rfl: 3    Continuous Blood Gluc Transmit (DEXCOM G6  TRANSMITTER) MISC, Change every 3 months., Disp: 1 each, Rfl: 3    cyclobenzaprine (FLEXERIL) 10 MG tablet, Take 1 tablet (10 mg) by mouth 3 times daily as needed for muscle spasms, Disp: 90 tablet, Rfl: 3    doxazosin (CARDURA) 8 MG tablet, Take 1 tablet (8 mg) by mouth at bedtime Take a total of 10 mg at bedtime, Disp: 90 tablet, Rfl: 3    empagliflozin (JARDIANCE) 10 MG TABS tablet, Take 1 tablet (10 mg) by mouth daily, Disp: 90 tablet, Rfl: 1    fluticasone (FLONASE) 50 MCG/ACT nasal spray, Spray 1 spray into both nostrils daily, Disp: 20 mL, Rfl: 3    furosemide (LASIX) 40 MG tablet, Take 1.5 tablets (60 mg) by mouth 2 times daily ., Disp: 200 tablet, Rfl: 3    gabapentin (NEURONTIN) 800 MG tablet, Take 1 tablet (800 mg) by mouth 3 times daily, Disp: 90 tablet, Rfl: 11    hydrOXYzine (ATARAX) 25 MG tablet, Take 1 tablet (25 mg) by mouth 3 times daily as needed for itching, Disp: 90 tablet, Rfl: 3    Insulin Disposable Pump (OMNIPOD 5 G6 INTRO, GEN 5,) KIT, 1 kit daily, Disp: 1 kit, Rfl: 0    Insulin Disposable Pump (OMNIPOD 5 G6 POD, GEN 5,) MISC, 1 each See Admin Instructions Change every 2 days. Use for insulin administration., Disp: 45 each, Rfl: 3    Insulin Disposable Pump (OMNIPOD 5 G6 POD, GEN 5,) MISC, 1 each See Admin Instructions Use per 's instructions., Disp: 1 each, Rfl: 1    Insulin Lispro (HUMALOG KWIKPEN) 200 UNIT/ML soln, To be used in the insulin pump. Total daily dose up to 170 U., Disp: 72 mL, Rfl: 3    insulin pen needle (BD ENE U/F) 32G X 4 MM miscellaneous, Use 1 pen needle 4 times daily or as directed., Disp: 400 each, Rfl: 1    ketorolac (ACULAR) 0.5 % ophthalmic solution, Place 1 drop into the right eye 4 times daily Start 2 days prior to surgery four times a day, after surgery: Four times a day x 1 week, three times a day x 1 week, twice a day x 1 week, daily x 1 week then stop, Disp: 10 mL, Rfl: 0    lidocaine (LIDODERM) 5 % patch, Place 1 patch onto the skin every 24  hours To prevent lidocaine toxicity, patient should be patch free for 12 hrs daily., Disp: 30 patch, Rfl: 11    lidocaine (XYLOCAINE) 5 % external ointment, Apply topically as needed for moderate pain, Disp: 90 g, Rfl: 11    losartan (COZAAR) 25 MG tablet, Take 1 tablet (25 mg) by mouth daily, Disp: 90 tablet, Rfl: 3    methocarbamol (ROBAXIN) 500 MG tablet, Take 1 tablet (500 mg) by mouth 2 times daily as needed for muscle spasms, Disp: 60 tablet, Rfl: 0    metoprolol succinate ER (TOPROL XL) 200 MG 24 hr tablet, Take 1 tablet (200 mg) by mouth daily, Disp: 90 tablet, Rfl: 3    moxifloxacin (VIGAMOX) 0.5 % ophthalmic solution, Place 1 drop into the right eye 4 times daily Start 2 days prior to surgery four times a day, after surgery: Four times a day x 1 week, Disp: 3 mL, Rfl: 0    mycophenolic acid (GENERIC EQUIVALENT) 360 MG EC tablet, Take 2 tablets (720 mg) by mouth every 12 hours, Disp: 360 tablet, Rfl: 3    oxyCODONE (ROXICODONE) 5 MG tablet, Take 1 tablet (5 mg) by mouth 4 times daily as needed for pain maximum 6 tablet(s) per day, Disp: 30 tablet, Rfl: 0    prednisoLONE acetate (PRED FORTE) 1 % ophthalmic suspension, Place 1-2 drops into the right eye 4 times daily after surgery: Four times a day x 1 week, three times a day x 1 week, twice a day x 1 week, daily x 1 week then stop, Disp: 10 mL, Rfl: 0    predniSONE (DELTASONE) 2.5 MG tablet, Take 1 tablet (2.5 mg) by mouth daily, Disp: 90 tablet, Rfl: 3    sildenafil (REVATIO) 20 MG tablet, Take 2 tablets (40 mg) by mouth daily as needed (erectile dysfunction), Disp: 10 tablet, Rfl: 11    tacrolimus (GENERIC EQUIVALENT) 1 MG capsule, Take 4 capsules (4 mg) by mouth every morning AND 3 capsules (3 mg) every evening., Disp: 630 capsule, Rfl: 3    tretinoin (RETIN-A) 0.025 % external cream, Apply a pea-sized amount evenly over the face at nighttime before bed, Disp: 45 g, Rfl: 11    triamcinolone (KENALOG) 0.1 % external ointment, Apply topically 2 times daily  to the itching on the legs, Disp: 80 g, Rfl: 1    ursodiol (ACTIGALL) 500 MG tablet, Take 1 tablet (500 mg) by mouth 2 times daily, Disp: 180 tablet, Rfl: 3    Social history:   . Lives alone. Former . 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  Former smoker for 2 years, 0.75 packs/day, quit in 1988.  He used to chew tobacco and he stopped this in 2015.  He has not been drinking any alcohol since the liver transplant.    OBJECTIVE:    Wt Readings from Last 10 Encounters:   02/05/24 131.5 kg (290 lb)   02/01/24 132.5 kg (292 lb)   01/24/24 132.6 kg (292 lb 6.4 oz)   01/10/24 132.5 kg (292 lb 3.2 oz)   06/23/23 129.3 kg (285 lb)   05/30/23 132.9 kg (293 lb)   05/15/23 132.9 kg (293 lb)   05/09/23 133.8 kg (295 lb)   04/18/23 130.2 kg (287 lb)   04/17/23 131.5 kg (290 lb)     BP (!) 168/74   Pulse 64   Ht 1.829 m (6')   Wt 131.5 kg (290 lb)   SpO2 98%   BMI 39.33 kg/m       Physical exam  General appearance: No distress noted, General obesity.  Normal male hair pattern.   Bilateral gynecomastia with no palpable nodules, L>R  Eyes: conjunctivae and extraocular motions are normal. Pupils are equal, round, and reactive to light. No lid lag, no stare.  HENT: oropharynx clear and moist; neck no JVD, no bruits, no thyromegaly, no palpable nodules  Cardiovascular: regular rhythm, no murmurs, distal pulses palpable, no edema  Respiratory: chest clear, no rales, no rhonchi  Musculoskeletal: normal tone and strength   Neurologic: Absent knee reflexes, no resting tremor  Psychiatric: affect and judgment normal     Lab Results   Component Value Date    A1C 6.2 (H) 11/30/2023    A1C 5.7 (H) 07/31/2023    A1C 6.0 (H) 09/27/2022    A1C 5.2 03/03/2022    A1C 7.1 (H) 08/18/2021    A1C 6.3 (H) 03/31/2021    A1C 6.7 (H) 02/12/2021    A1C 6.4 (H) 11/24/2020    A1C 6.2 (H) 10/06/2020    A1C 5.5 04/11/2018    HEMOGLOBINA1 5.3 12/06/2021    HEMOGLOBINA1 5.8 11/18/2019    HEMOGLOBINA1 6.1 (A) 11/19/2018        Hemoglobin   Date Value Ref Range Status   01/09/2024 11.9 (L) 13.3 - 17.7 g/dL Final   05/27/2021 11.9 (L) 13.3 - 17.7 g/dL Final     Hematocrit   Date Value Ref Range Status   01/09/2024 34.5 (L) 40.0 - 53.0 % Final   05/27/2021 34.7 (L) 40.0 - 53.0 % Final     Cholesterol   Date Value Ref Range Status   01/09/2024 155 <200 mg/dL Final   11/24/2020 134 <200 mg/dL Final     Cholesterol/HDL Ratio   Date Value Ref Range Status   07/03/2012 3.8 0.0 - 5.0 Final     HDL Cholesterol   Date Value Ref Range Status   11/24/2020 39 (L) >39 mg/dL Final     Direct Measure HDL   Date Value Ref Range Status   01/09/2024 41 >=40 mg/dL Final     LDL Cholesterol Calculated   Date Value Ref Range Status   01/09/2024 77 <=100 mg/dL Final   11/24/2020 35 <100 mg/dL Final     Comment:     Desirable:       <100 mg/dl     VLDL-Cholesterol   Date Value Ref Range Status   07/03/2012 36 (H) 0 - 30 mg/dL Final     Triglycerides   Date Value Ref Range Status   01/09/2024 185 (H) <150 mg/dL Final   11/24/2020 298 (H) <150 mg/dL Final     Comment:     Borderline high:  150-199 mg/dl  High:             200-499 mg/dl  Very high:       >499 mg/dl       Albumin Urine mg/L   Date Value Ref Range Status   10/27/2017 122 mg/L Final     TSH   Date Value Ref Range Status   04/19/2023 3.12 0.30 - 4.20 uIU/mL Final   04/02/2018 2.74 0.40 - 4.00 mU/L Final     Last Basic Metabolic Panel:    Sodium   Date Value Ref Range Status   01/09/2024 135 135 - 145 mmol/L Final     Comment:     Reference intervals for this test were updated on 09/26/2023 to more accurately reflect our healthy population. There may be differences in the flagging of prior results with similar values performed with this method. Interpretation of those prior results can be made in the context of the updated reference intervals.    06/08/2021 134 133 - 144 mmol/L Final     Potassium   Date Value Ref Range Status   01/09/2024 5.3 3.4 - 5.3 mmol/L Final   01/30/2023 5.3 3.4 - 5.3  mmol/L Final   06/08/2021 4.9 3.4 - 5.3 mmol/L Final     Chloride   Date Value Ref Range Status   01/09/2024 101 98 - 107 mmol/L Final   01/30/2023 114 (H) 94 - 109 mmol/L Final   06/08/2021 105 94 - 109 mmol/L Final     Calcium   Date Value Ref Range Status   01/09/2024 9.3 8.6 - 10.0 mg/dL Final   06/08/2021 8.9 8.5 - 10.1 mg/dL Final     Carbon Dioxide   Date Value Ref Range Status   06/08/2021 23 20 - 32 mmol/L Final     Carbon Dioxide (CO2)   Date Value Ref Range Status   01/09/2024 23 22 - 29 mmol/L Final   01/30/2023 22 20 - 32 mmol/L Final     Urea Nitrogen   Date Value Ref Range Status   01/09/2024 36.2 (H) 8.0 - 23.0 mg/dL Final   01/30/2023 48 (H) 7 - 30 mg/dL Final   06/08/2021 43 (H) 7 - 30 mg/dL Final     Creatinine   Date Value Ref Range Status   01/09/2024 1.65 (H) 0.67 - 1.17 mg/dL Final   06/08/2021 1.51 (H) 0.66 - 1.25 mg/dL Final     GFR Estimate   Date Value Ref Range Status   01/09/2024 48 (L) >60 mL/min/1.73m2 Final   06/08/2021 51 (L) >60 mL/min/[1.73_m2] Final     Comment:     Non  GFR Calc  Starting 12/18/2018, serum creatinine based estimated GFR (eGFR) will be   calculated using the Chronic Kidney Disease Epidemiology Collaboration   (CKD-EPI) equation.       Glucose   Date Value Ref Range Status   01/30/2023 78 70 - 99 mg/dL Final   06/08/2021 249 (H) 70 - 99 mg/dL Final     GLUCOSE BY METER POCT   Date Value Ref Range Status   02/01/2024 86 70 - 99 mg/dL Final       AST   Date Value Ref Range Status   01/09/2024 26 0 - 45 U/L Final   05/27/2021 19 0 - 45 U/L Final     ALT   Date Value Ref Range Status   01/09/2024 21 0 - 70 U/L Final   05/27/2021 32 0 - 70 U/L Final     Albumin Urine mg/g Cr   Date Value Ref Range Status   10/27/2017 132.46 (H) 0 - 17 mg/g Cr Final     ASSESSMENT/PLAN:    Camacho Bhagat is a 58 year old man w/ PMx of CASTAÑEDA/HCC s/p liver transplant (3/2017), fibromyalgia, DVT, HTN, RONNIE, OA, and CVA who presents for follow-up of his DM-II     1. Type 2  Diabetes  A1c less reliable in the context of mild stable anemia.  He does experience postprandial hyperglycemia. Up to date with foot and eye exams.   Recommendations:  Accurately document the carbohydrate intake  Change the insulin to carbohydrate ratio to 1 unit per 7 g carbohydrates  Try to bolus 15 minutes prior to eating  Continue Jardiance, currently prescribed by nephrology.  I recommended to have the glucagon prescription refilled.  He lives alone and he does not foresee using it.  Discussed about potential situations where he might benefit from use of glucagon like having significant nausea and vomiting and not being able to retain food or liquids.  He declined having the prescription refilled.    2. High CV risk   Recommended treatment with statins but the patient declined.    3.  Obesity  Discussed about the option of considering treatment with a GLP-1 agonist but the patient is not interested.    4.  Osteopenia  Recent vitamin D level was normal.  He is due for a DXA scan.  The patient prefers to follow-up on this with Dr. Camejo.    Orders Placed This Encounter   Procedures    GLUCOSE MONITOR, 72 HOUR, PHYS INTERP

## 2024-02-05 NOTE — LETTER
2/5/2024       RE: Camacho Bhagat  [address]  [facility] [id number]     Dear Colleague,    Thank you for referring your patient, Camacho Bhagat, to the [facility] [place] at [facility]. Please see a copy of my visit note below.    Outcome for 02/02/24 2:54 PM: Data obtained via Dexcom and [employer] website                    Camacho Bhagat is a 58 year old male with PMHx of CASTAÑEDA/HCC s/p liver transplant (3/2017), fibromyalgia, CKD, DVT, HTN, RONNIE, OA, who presents for follow-up of type 2 DM, diagnosed in 2002.  Oral meds first, later placed on insulin.     Related history: h/o CASTAÑEDA cirrhosis and HCC s/p liver transplant 3/2017. Course complicated by acute abdomen/neutropenic fever requiring right hemicolectomy and colostomy Fall 2017. He later underwent colostomy takedown 1/5/18 which was complicated by abdominal wall abscess at a drain site. During this course, he has also developed excessive proteinuria.    Hemoglobin A1c was 6.2% on 11/30/23, in the context of anemia.  I reviewed both insulin pump and sensor records.    Insulin pump (Omnipod 5, May 2023) settings:  Basal rate   12 AM 1.5 units/h  8 AM 1.45 units/h  2 PM 1.4 units/h   6 PM 1.45 units/h   9 PM 1.5 units/h  Insulin to carbohydrate ratio 1 unit per 11 g   Sensitivity 25   Blood glucose correction threshold 120  Glucose target range 110  Active insulin time 3 hours    He uses Humalog 200 pens to fill the pump reservoir.     Total daily insulin dose is 74 units, of which 60% is basal insulin.  On the sensor, 59% of the glucose numbers are within target of , 6% are above 250 and there are no significant hypoglycemic episodes.  The average glucose on the sensor is 175 with a standard deviation of 46 and a coefficient of variation 26.5%.  The pump is used in the automated mode 99% There are approximately 2.6 entries/day for CHO intake.  The sensor reveals a trend of  postprandial hyperglycemia, which the patient tries to correct by administering higher correction dosages than what is recommended by the pump.    Treatment with empagliflozin was started on 1/10/24, at 10 mg daily, by nephrology.  He recently had to discontinue it prior to the cataract surgery and he restarted taking it yesterday.    In the past, he tried Victoza and he was not able to tolerate it due to projectile vomiting.    Related meds:  Prednisone started for transplant and the dose remains 2.5 mg per day.     Complications  + chronic complications.      1. Retinopathy: No known eye disease  Last eye exam was in 2/24.  S/P right cataract surgery.      2.  Nephropathy: yes. CKD IIIa . He has significant proteinuria and f/up with nephrology.  Recent GFR in the 40s. Discontinued losartan around 3/2019 due to hyperkalemia. It was restarted at 25 mg daily.      3. Neuropathy.  Gabapentin was prescribed to control the neuropathic pain in the left foot, and back pain which started over 10 years ago.  He takes 800 mg TID. No ulcers or infection. No amputation.  He has bilateral foot pain.  Follows up with the podiatry in the last appointment was in 11/23.      4. Lipids   1/9/24: LDL cholesterol 77, HDL cholesterol 41,      In general, he is able to recognize the hypoglycemic episodes: He feels flushed, warm and dizzy.  The lowest blood glucose he remembers experiencing was in the 50s.  He does not have glucagon injection at home.     LS XRay from 9/13/22 revealed an L1 vertebral compression fracture.  The fracture was not mentioned on the follow-up MRI.   The patient has a longstanding history of back pain, which started in 1993.  The lumbar spine x-ray was recommended as he complained of hip pain, for which he has been receiving intra-articular steroid injections.    DXA 2/2020  The most negative and valid T-score of -1.8 at the level of the right femoral neck. Compared with baseline 2019 exam there was a  significant decrease of BMD at the level of the lumbar spine, left total hip and right total hip.  (-2.6%; -17.4%; -18.2%).   Vitamin D 43 on 2/1/24. He has been taking 5000 U daily.   PTH 31 on 9/27/22     Past Medical History  Past Medical History:   Diagnosis Date    Cancer (H) 12/15    Stage 4 liver cancer    Diabetes type 2, controlled (H) 11/10/2016    Esophageal reflux     Fibromyalgia 01/2009    dx with Dr Benitez( Rheum)    Gangrene of finger (H) 08/25/2017    H/O deep venous thrombosis 11/2001    Permanent IVC filter in place.    H/O CASTAÑEDA (nonalcoholic steatohepatitis)     H/O Pneumonia, organism unspecified(486) 10/2001    Included ARDS, sepsis, and  acute renal failure; hospitalized, required tracheostomy placement.    H/O: HTN (hypertension) 11/2001    No longer prescribed antihypertensive medication.      History of hepatocellular carcinoma     History of liver transplant (H)     History of obstructive sleep apnea     No longer wears CPAP since losing approximately 200 pounds with his liver transplant and its complications.      HLD (hyperlipidemia)     Hypertension     Ischemia of both lower extremities 08/25/2017    Distal ischemia due to shock/high pressor requirements    Liver transplant rejection (H) 06/11/2018    Neutropenic colitis  (H24) 07/04/2017    Osteoarthritis     Presence of PERMANENT IVC filter     Rheumatoid arthritis(714.0)     remission for many years   Osteopenia 2020    Past Surgical History  Past Surgical History:   Procedure Laterality Date    ARTHROSCOPY KNEE WITH MENISCAL REPAIR Right 2008    Right knee arthroscopy    BENCH LIVER N/A 03/04/2017    Procedure: BENCH LIVER;  Surgeon: Jovan Tran MD;  Location: [facility] OR    BIOPSY  5/2017    Liver    CHOLECYSTECTOMY      COLONOSCOPY N/A 07/21/2017    Procedure: COMBINED COLONOSCOPY, SINGLE OR MULTIPLE BIOPSY/POLYPECTOMY BY BIOPSY;  Colonoscopy;  Surgeon: Izaiah Montes MD;  Location: [facility] GI    COLONOSCOPY N/A 10/24/2022     Procedure: COLONOSCOPY, WITH POLYPECTOMY AND BIOPSY;  Surgeon: Abril Mcneill MD;  Location: [facility] GI    ENDOSCOPIC RETROGRADE CHOLANGIOPANCREATOGRAM N/A 05/22/2018    Procedure: COMBINED ENDOSCOPIC RETROGRADE CHOLANGIOPANCREATOGRAPHY, PLACE TUBE/STENT;  Endoscopic Retrograde Cholangiopancreatography with sphincterotomy and pancreatic duct stent placement;  Surgeon: Zay Gaitan MD;  Location: [facility] OR    ENT SURGERY      Repair of deviated septum    ESOPHAGOSCOPY, GASTROSCOPY, DUODENOSCOPY (EGD), COMBINED N/A 08/04/2016    Procedure: COMBINED ESOPHAGOSCOPY, GASTROSCOPY, DUODENOSCOPY (EGD), BIOPSY SINGLE OR MULTIPLE;  Surgeon: Trent Pederson MD;  Location:  GI    ESOPHAGOSCOPY, GASTROSCOPY, DUODENOSCOPY (EGD), COMBINED N/A 08/27/2017    Procedure: COMBINED ESOPHAGOSCOPY, GASTROSCOPY, DUODENOSCOPY (EGD);;  Surgeon: Los Wynn MD;  Location:  GI    ESOPHAGOSCOPY, GASTROSCOPY, DUODENOSCOPY (EGD), COMBINED N/A 08/17/2023    Procedure: Esophagoscopy, gastroscopy, duodenoscopy (EGD), combined;  Surgeon: Trent Villanueva MD;  Location:  GI    INCISION AND DRAINAGE ABDOMEN WASHOUT, COMBINED Right 02/14/2018    Procedure: COMBINED INCISION AND DRAINAGE ABDOMEN WASHOUT;  COMBINED INCISION AND DRAINAGE right ABDOMEN flank;  Surgeon: Sara Dinh MD;  Location: [facility]    LAPAROTOMY EXPLORATORY N/A 07/04/2017    Procedure: LAPAROTOMY EXPLORATORY;  Exploratory Laparotomy, washout;  Surgeon: Tip Zhang MD;  [facility]: [facility]    LAPAROTOMY EXPLORATORY N/A 07/05/2017    Procedure: LAPAROTOMY EXPLORATORY;  Exploratory Laparotomy, Washout with closure.;  Surgeon: Tip Zhang MD;  [facility]: [facility]    LAPAROTOMY EXPLORATORY N/A 07/26/2017    Procedure: LAPAROTOMY EXPLORATORY;  Exploratory Laparotomy, Right angelique-colectomy, end ileostomy, mucosal fistula, partial omentectomy;  Surgeon: Sara Dinh MD;  Location:  OR    [facility]      ORTHOPEDIC SURGERY Right     Repair  of dislocated wrist.      PHACOEMULSIFICATION CLEAR CORNEA WITH STANDARD INTRAOCULAR LENS IMPLANT Right 2024    Procedure: RIGHT EYE PHACOEMULSIFICATION, COMPLEX CATARACT, WITH INTRAOCULAR LENS IMPLANT;  Surgeon: Stan Roque MD;  Location: [facility] OR    RELEASE TRIGGER FINGER Right 11/10/2017    Procedure: RELEASE TRIGGER FINGER;  Debride, V-Y Flap Right Index Finger;  Surgeon: Momo Noonan MD;  [facility]    TAKEDOWN ILEOSTOMY N/A 2018    Procedure: TAKEDOWN ILEOSTOMY;  Exploratory Laparotomy, Lysis of Adhesions, Takedown Of End Ileostomy, Takedown of mucocutaneous fistula, ileocolic resection  And Colorectal Anastomosis, Primary repair of Ventral Hernia, Anesthesia Block ;  Surgeon: Sara Dinh MD;  Location: [facility] OR    TRACHEOSTOMY  2001    During hospitalization for pneumonia.      TRANSPLANT LIVER RECIPIENT  DONOR N/A 2017    Procedure: TRANSPLANT LIVER RECIPIENT  DONOR;  Surgeon: Jovan Tran MD;  Location: [facility] OR        Medications    Current Outpatient Medications:     alcohol swab prep pads, Use to swab area of injection/francisca as directed., Disp: 100 each, Rfl: 3    alpha-lipoic acid 600 MG capsule, Take 600 mg by mouth, Disp: , Rfl:     amLODIPine (NORVASC) 10 MG tablet, Take 1 tablet (10 mg) by mouth daily, Disp: 90 tablet, Rfl: 3    augmented betamethasone dipropionate (DIPROLENE-AF) 0.05 % external ointment, Apply twice daily as needed for rash on the legs, Disp: 45 g, Rfl: 11    blood glucose (NO BRAND SPECIFIED) lancets standard, Use to test blood sugar 1 times daily or as directed., Disp: 100 lancet, Rfl: 3    blood glucose (NO BRAND SPECIFIED) test strip, Use to test blood sugar 1 times daily or as directed., Disp: 50 strip, Rfl: 11    blood glucose calibration (NO BRAND SPECIFIED) solution, Use to calibrate meter., Disp: 1 Bottle, Rfl: 3    Blood Glucose Monitoring Suppl (CONTOUR BLOOD GLUCOSE SYSTEM) w/Device KIT, For  use with the Omnipod Dash insulin pump, Disp: 1 kit, Rfl: 1    cholecalciferol (VITAMIN D3) 1000 UNIT tablet, Take 1 tablet (1,000 Units) by mouth daily (Patient taking differently: Take 1,000 Units by mouth every morning), Disp: 100 tablet, Rfl: 3    Continuous Blood Gluc Sensor (DEXCOM G6 SENSOR) MISC, Change every 10 days., Disp: 9 each, Rfl: 3    Continuous Blood Gluc Transmit (DEXCOM G6 TRANSMITTER) MISC, Change every 3 months., Disp: 1 each, Rfl: 3    cyclobenzaprine (FLEXERIL) 10 MG tablet, Take 1 tablet (10 mg) by mouth 3 times daily as needed for muscle spasms, Disp: 90 tablet, Rfl: 3    doxazosin (CARDURA) 8 MG tablet, Take 1 tablet (8 mg) by mouth at bedtime Take a total of 10 mg at bedtime, Disp: 90 tablet, Rfl: 3    empagliflozin (JARDIANCE) 10 MG TABS tablet, Take 1 tablet (10 mg) by mouth daily, Disp: 90 tablet, Rfl: 1    fluticasone (FLONASE) 50 MCG/ACT nasal spray, Spray 1 spray into both nostrils daily, Disp: 20 mL, Rfl: 3    furosemide (LASIX) 40 MG tablet, Take 1.5 tablets (60 mg) by mouth 2 times daily ., Disp: 200 tablet, Rfl: 3    gabapentin (NEURONTIN) 800 MG tablet, Take 1 tablet (800 mg) by mouth 3 times daily, Disp: 90 tablet, Rfl: 11    hydrOXYzine (ATARAX) 25 MG tablet, Take 1 tablet (25 mg) by mouth 3 times daily as needed for itching, Disp: 90 tablet, Rfl: 3    Insulin Disposable Pump (OMNIPOD 5 G6 INTRO, GEN 5,) KIT, 1 kit daily, Disp: 1 kit, Rfl: 0    Insulin Disposable Pump (OMNIPOD 5 G6 POD, GEN 5,) MISC, 1 each See Admin Instructions Change every 2 days. Use for insulin administration., Disp: 45 each, Rfl: 3    Insulin Disposable Pump (OMNIPOD 5 G6 POD, GEN 5,) MISC, 1 each See Admin Instructions Use per 's instructions., Disp: 1 each, Rfl: 1    Insulin Lispro (HUMALOG KWIKPEN) 200 UNIT/ML soln, To be used in the insulin pump. Total daily dose up to 170 U., Disp: 72 mL, Rfl: 3    insulin pen needle (BD ENE U/F) 32G X 4 MM miscellaneous, Use 1 pen needle 4 times daily  or as directed., Disp: 400 each, Rfl: 1    ketorolac (ACULAR) 0.5 % ophthalmic solution, Place 1 drop into the right eye 4 times daily Start 2 days prior to surgery four times a day, after surgery: Four times a day x 1 week, three times a day x 1 week, twice a day x 1 week, daily x 1 week then stop, Disp: 10 mL, Rfl: 0    lidocaine (LIDODERM) 5 % patch, Place 1 patch onto the skin every 24 hours To prevent lidocaine toxicity, patient should be patch free for 12 hrs daily., Disp: 30 patch, Rfl: 11    lidocaine (XYLOCAINE) 5 % external ointment, Apply topically as needed for moderate pain, Disp: 90 g, Rfl: 11    losartan (COZAAR) 25 MG tablet, Take 1 tablet (25 mg) by mouth daily, Disp: 90 tablet, Rfl: 3    methocarbamol (ROBAXIN) 500 MG tablet, Take 1 tablet (500 mg) by mouth 2 times daily as needed for muscle spasms, Disp: 60 tablet, Rfl: 0    metoprolol succinate ER (TOPROL XL) 200 MG 24 hr tablet, Take 1 tablet (200 mg) by mouth daily, Disp: 90 tablet, Rfl: 3    moxifloxacin (VIGAMOX) 0.5 % ophthalmic solution, Place 1 drop into the right eye 4 times daily Start 2 days prior to surgery four times a day, after surgery: Four times a day x 1 week, Disp: 3 mL, Rfl: 0    mycophenolic acid (GENERIC EQUIVALENT) 360 MG EC tablet, Take 2 tablets (720 mg) by mouth every 12 hours, Disp: 360 tablet, Rfl: 3    oxyCODONE (ROXICODONE) 5 MG tablet, Take 1 tablet (5 mg) by mouth 4 times daily as needed for pain maximum 6 tablet(s) per day, Disp: 30 tablet, Rfl: 0    prednisoLONE acetate (PRED FORTE) 1 % ophthalmic suspension, Place 1-2 drops into the right eye 4 times daily after surgery: Four times a day x 1 week, three times a day x 1 week, twice a day x 1 week, daily x 1 week then stop, Disp: 10 mL, Rfl: 0    predniSONE (DELTASONE) 2.5 MG tablet, Take 1 tablet (2.5 mg) by mouth daily, Disp: 90 tablet, Rfl: 3    sildenafil (REVATIO) 20 MG tablet, Take 2 tablets (40 mg) by mouth daily as needed (erectile dysfunction), Disp: 10  tablet, Rfl: 11    tacrolimus (GENERIC EQUIVALENT) 1 MG capsule, Take 4 capsules (4 mg) by mouth every morning AND 3 capsules (3 mg) every evening., Disp: 630 capsule, Rfl: 3    tretinoin (RETIN-A) 0.025 % external cream, Apply a pea-sized amount evenly over the face at nighttime before bed, Disp: 45 g, Rfl: 11    triamcinolone (KENALOG) 0.1 % external ointment, Apply topically 2 times daily to the itching on the legs, Disp: 80 g, Rfl: 1    ursodiol (ACTIGALL) 500 MG tablet, Take 1 tablet (500 mg) by mouth 2 times daily, Disp: 180 tablet, Rfl: 3    Social history:   . Lives alone. Former . 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  Former smoker for 2 years, 0.75 packs/day, quit in 1988.  He used to chew tobacco and he stopped this in 2015.  He has not been drinking any alcohol since the liver transplant.    OBJECTIVE:    Wt Readings from Last 10 Encounters:   02/05/24 131.5 kg (290 lb)   02/01/24 132.5 kg (292 lb)   01/24/24 132.6 kg (292 lb 6.4 oz)   01/10/24 132.5 kg (292 lb 3.2 oz)   06/23/23 129.3 kg (285 lb)   05/30/23 132.9 kg (293 lb)   05/15/23 132.9 kg (293 lb)   05/09/23 133.8 kg (295 lb)   04/18/23 130.2 kg (287 lb)   04/17/23 131.5 kg (290 lb)     BP (!) 168/74   Pulse 64   Ht 1.829 m (6')   Wt 131.5 kg (290 lb)   SpO2 98%   BMI 39.33 kg/m       Physical exam  General appearance: No distress noted, General obesity.  Normal male hair pattern.   Bilateral gynecomastia with no palpable nodules, L>R  Eyes: conjunctivae and extraocular motions are normal. Pupils are equal, round, and reactive to light. No lid lag, no stare.  HENT: oropharynx clear and moist; neck no JVD, no bruits, no thyromegaly, no palpable nodules  Cardiovascular: regular rhythm, no murmurs, distal pulses palpable, no edema  Respiratory: chest clear, no rales, no rhonchi  Musculoskeletal: normal tone and strength   Neurologic: Absent knee reflexes, no resting tremor  Psychiatric: affect and judgment normal      Lab Results   Component Value Date    A1C 6.2 (H) 11/30/2023    A1C 5.7 (H) 07/31/2023    A1C 6.0 (H) 09/27/2022    A1C 5.2 03/03/2022    A1C 7.1 (H) 08/18/2021    A1C 6.3 (H) 03/31/2021    A1C 6.7 (H) 02/12/2021    A1C 6.4 (H) 11/24/2020    A1C 6.2 (H) 10/06/2020    A1C 5.5 04/11/2018    HEMOGLOBINA1 5.3 12/06/2021    HEMOGLOBINA1 5.8 11/18/2019    HEMOGLOBINA1 6.1 (A) 11/19/2018       Hemoglobin   Date Value Ref Range Status   01/09/2024 11.9 (L) 13.3 - 17.7 g/dL Final   05/27/2021 11.9 (L) 13.3 - 17.7 g/dL Final     Hematocrit   Date Value Ref Range Status   01/09/2024 34.5 (L) 40.0 - 53.0 % Final   05/27/2021 34.7 (L) 40.0 - 53.0 % Final     Cholesterol   Date Value Ref Range Status   01/09/2024 155 <200 mg/dL Final   11/24/2020 134 <200 mg/dL Final     Cholesterol/HDL Ratio   Date Value Ref Range Status   07/03/2012 3.8 0.0 - 5.0 Final     HDL Cholesterol   Date Value Ref Range Status   11/24/2020 39 (L) >39 mg/dL Final     Direct Measure HDL   Date Value Ref Range Status   01/09/2024 41 >=40 mg/dL Final     LDL Cholesterol Calculated   Date Value Ref Range Status   01/09/2024 77 <=100 mg/dL Final   11/24/2020 35 <100 mg/dL Final     Comment:     Desirable:       <100 mg/dl     VLDL-Cholesterol   Date Value Ref Range Status   07/03/2012 36 (H) 0 - 30 mg/dL Final     Triglycerides   Date Value Ref Range Status   01/09/2024 185 (H) <150 mg/dL Final   11/24/2020 298 (H) <150 mg/dL Final     Comment:     Borderline high:  150-199 mg/dl  High:             200-499 mg/dl  Very high:       >499 mg/dl       Albumin Urine mg/L   Date Value Ref Range Status   10/27/2017 122 mg/L Final     TSH   Date Value Ref Range Status   04/19/2023 3.12 0.30 - 4.20 uIU/mL Final   04/02/2018 2.74 0.40 - 4.00 mU/L Final     Last Basic Metabolic Panel:    Sodium   Date Value Ref Range Status   01/09/2024 135 135 - 145 mmol/L Final     Comment:     Reference intervals for this test were updated on 09/26/2023 to more accurately reflect  our healthy population. There may be differences in the flagging of prior results with similar values performed with this method. Interpretation of those prior results can be made in the context of the updated reference intervals.    06/08/2021 134 133 - 144 mmol/L Final     Potassium   Date Value Ref Range Status   01/09/2024 5.3 3.4 - 5.3 mmol/L Final   01/30/2023 5.3 3.4 - 5.3 mmol/L Final   06/08/2021 4.9 3.4 - 5.3 mmol/L Final     Chloride   Date Value Ref Range Status   01/09/2024 101 98 - 107 mmol/L Final   01/30/2023 114 (H) 94 - 109 mmol/L Final   06/08/2021 105 94 - 109 mmol/L Final     Calcium   Date Value Ref Range Status   01/09/2024 9.3 8.6 - 10.0 mg/dL Final   06/08/2021 8.9 8.5 - 10.1 mg/dL Final     Carbon Dioxide   Date Value Ref Range Status   06/08/2021 23 20 - 32 mmol/L Final     Carbon Dioxide (CO2)   Date Value Ref Range Status   01/09/2024 23 22 - 29 mmol/L Final   01/30/2023 22 20 - 32 mmol/L Final     Urea Nitrogen   Date Value Ref Range Status   01/09/2024 36.2 (H) 8.0 - 23.0 mg/dL Final   01/30/2023 48 (H) 7 - 30 mg/dL Final   06/08/2021 43 (H) 7 - 30 mg/dL Final     Creatinine   Date Value Ref Range Status   01/09/2024 1.65 (H) 0.67 - 1.17 mg/dL Final   06/08/2021 1.51 (H) 0.66 - 1.25 mg/dL Final     GFR Estimate   Date Value Ref Range Status   01/09/2024 48 (L) >60 mL/min/1.73m2 Final   06/08/2021 51 (L) >60 mL/min/[1.73_m2] Final     Comment:     Non  GFR Calc  Starting 12/18/2018, serum creatinine based estimated GFR (eGFR) will be   calculated using the Chronic Kidney Disease Epidemiology Collaboration   (CKD-EPI) equation.       Glucose   Date Value Ref Range Status   01/30/2023 78 70 - 99 mg/dL Final   06/08/2021 249 (H) 70 - 99 mg/dL Final     GLUCOSE BY METER POCT   Date Value Ref Range Status   02/01/2024 86 70 - 99 mg/dL Final       AST   Date Value Ref Range Status   01/09/2024 26 0 - 45 U/L Final   05/27/2021 19 0 - 45 U/L Final     ALT   Date Value Ref Range  Status   01/09/2024 21 0 - 70 U/L Final   05/27/2021 32 0 - 70 U/L Final     Albumin Urine mg/g Cr   Date Value Ref Range Status   10/27/2017 132.46 (H) 0 - 17 mg/g Cr Final     ASSESSMENT/PLAN:    Camacho Bhagat is a 58 year old man w/ PMx of CASTAÑEDA/HCC s/p liver transplant (3/2017), fibromyalgia, DVT, HTN, RONNIE, OA, and CVA who presents for follow-up of his DM-II     1. Type 2 Diabetes  A1c less reliable in the context of mild stable anemia.  He does experience postprandial hyperglycemia. Up to date with foot and eye exams.   Recommendations:  Accurately document the carbohydrate intake  Change the insulin to carbohydrate ratio to 1 unit per 7 g carbohydrates  Try to bolus 15 minutes prior to eating  Continue Jardiance, currently prescribed by nephrology.  I recommended to have the glucagon prescription refilled.  He lives alone and he does not foresee using it.  Discussed about potential situations where he might benefit from use of glucagon like having significant nausea and vomiting and not being able to retain food or liquids.  He declined having the prescription refilled.    2. High CV risk   Recommended treatment with statins but the patient declined.    3.  Obesity  Discussed about the option of considering treatment with a GLP-1 agonist but the patient is not interested.    4.  Osteopenia  Recent vitamin D level was normal.  He is due for a DXA scan.  The patient prefers to follow-up on this with Dr. Camejo.    Orders Placed This Encounter   Procedures    GLUCOSE MONITOR, 72 HOUR, PHYS INTERP

## 2024-02-05 NOTE — NURSING NOTE
Chief Complaint   Patient presents with    Diabetes     Vital signs:      BP: (!) 168/74 Pulse: 64     SpO2: 98 %     Height: 182.9 cm (6') Weight: 131.5 kg (290 lb)  Estimated body mass index is 39.33 kg/m  as calculated from the following:    Height as of this encounter: 1.829 m (6').    Weight as of this encounter: 131.5 kg (290 lb).

## 2024-02-07 ENCOUNTER — OFFICE VISIT (OUTPATIENT)
Dept: OPHTHALMOLOGY | Facility: CLINIC | Age: 59
End: 2024-02-07
Attending: OPHTHALMOLOGY
Payer: COMMERCIAL

## 2024-02-07 DIAGNOSIS — Z96.1 PSEUDOPHAKIA, RIGHT EYE: Primary | ICD-10-CM

## 2024-02-07 DIAGNOSIS — H25.812 COMBINED FORMS OF AGE-RELATED CATARACT OF LEFT EYE: ICD-10-CM

## 2024-02-07 PROCEDURE — 99024 POSTOP FOLLOW-UP VISIT: CPT | Performed by: OPHTHALMOLOGY

## 2024-02-07 PROCEDURE — 99211 OFF/OP EST MAY X REQ PHY/QHP: CPT | Performed by: OPHTHALMOLOGY

## 2024-02-07 ASSESSMENT — REFRACTION_WEARINGRX
OD_SPHERE: -0.50
OS_ADD: +1.60
OD_ADD: 1.61
SPECS_TYPE: PAL
OS_SPHERE: +1.00
OD_CYLINDER: +1.00
OS_CYLINDER: +0.50
OD_AXIS: 016
OS_AXIS: 165

## 2024-02-07 ASSESSMENT — VISUAL ACUITY
METHOD: SNELLEN - LINEAR
OD_CC+: -1
OS_CC: 20/30
OD_CC: 20/20
METHOD_MR_RETINOSCOPY: 1
METHOD_MR: DX PURPOSES

## 2024-02-07 ASSESSMENT — TONOMETRY
OS_IOP_MMHG: 17
IOP_METHOD: TONOPEN
OD_IOP_MMHG: 19

## 2024-02-07 ASSESSMENT — EXTERNAL EXAM - RIGHT EYE: OD_EXAM: NORMAL

## 2024-02-07 ASSESSMENT — REFRACTION_MANIFEST
OD_CYLINDER: +1.25
OD_AXIS: 035
OD_SPHERE: -1.00

## 2024-02-07 ASSESSMENT — SLIT LAMP EXAM - LIDS
COMMENTS: NORMAL
COMMENTS: NORMAL

## 2024-02-07 NOTE — NURSING NOTE
Chief Complaints and History of Present Illnesses   Patient presents with    Post Op (Ophthalmology) Right Eye     Chief Complaint(s) and History of Present Illness(es)       Post Op (Ophthalmology) Right Eye              Laterality: right eye    Course: stable    Associated symptoms: Negative for eye pain, flashes and floaters    Treatments tried: eye drops              Comments    Camacho Bhagat is a(n) 58 year old male who presents for 1-week post op right eye. Vision is doing well. Compliant with drops. No eye pain. No flashes and floaters.    Gonzalez Leyva COT 7:57 AM February 7, 2024

## 2024-02-07 NOTE — PROGRESS NOTES
HPI       Post Op (Ophthalmology) Right Eye    In right eye.  Since onset it is stable.  Associated symptoms include Negative for eye pain, flashes and floaters.  Treatments tried include eye drops.             Comments    Camacho Bhagat is a(n) 58 year old male who presents for 1-week post op right eye. Vision is doing well. Compliant with drops. No eye pain. No flashes and floaters.    Gonzalez Leyva COT 7:57 AM February 7, 2024               Last edited by Gonzalez Leyva on 2/7/2024  7:58 AM.         Review of systems for the eyes was negative other than the pertinent positives/negatives listed in the HPI.      Assessment & Plan    HPI:  Camacho Bhagat is a 58 year old male with history of T2DM, HTN, HLD, ED, liver transplant, laser in situ keratomileusis (LASIK) presents for post-operative week right eye. Doing well    POHx: lasik  PMHx: 2DM, HTN, HLD, ED, liver transplant  Current Medications: alcohol swab prep pads, Use to swab area of injection/francisca as directed.  alpha-lipoic acid 600 MG capsule, Take 600 mg by mouth  amLODIPine (NORVASC) 10 MG tablet, Take 1 tablet (10 mg) by mouth daily  augmented betamethasone dipropionate (DIPROLENE-AF) 0.05 % external ointment, Apply twice daily as needed for rash on the legs  cholecalciferol (VITAMIN D3) 1000 UNIT tablet, Take 1 tablet (1,000 Units) by mouth daily (Patient taking differently: Take 1,000 Units by mouth every morning)  Continuous Blood Gluc Sensor (DEXCOM G6 SENSOR) MISC, Change every 10 days.  Continuous Blood Gluc Transmit (DEXCOM G6 TRANSMITTER) MISC, Change every 3 months.  cyclobenzaprine (FLEXERIL) 10 MG tablet, Take 1 tablet (10 mg) by mouth 3 times daily as needed for muscle spasms  doxazosin (CARDURA) 8 MG tablet, Take 1 tablet (8 mg) by mouth at bedtime Take a total of 10 mg at bedtime  empagliflozin (JARDIANCE) 10 MG TABS tablet, Take 1 tablet (10 mg) by mouth daily  fluticasone (FLONASE) 50 MCG/ACT nasal spray, Spray 1 spray into both nostrils  daily  furosemide (LASIX) 40 MG tablet, Take 1.5 tablets (60 mg) by mouth 2 times daily .  gabapentin (NEURONTIN) 800 MG tablet, Take 1 tablet (800 mg) by mouth 3 times daily  hydrOXYzine (ATARAX) 25 MG tablet, Take 1 tablet (25 mg) by mouth 3 times daily as needed for itching  Insulin Disposable Pump (OMNIPOD 5 G6 INTRO, GEN 5,) KIT, 1 kit daily  Insulin Disposable Pump (OMNIPOD 5 G6 POD, GEN 5,) MISC, 1 each See Admin Instructions Change every 2 days. Use for insulin administration.  Insulin Disposable Pump (OMNIPOD 5 G6 POD, GEN 5,) MISC, 1 each See Admin Instructions Use per 's instructions.  Insulin Lispro (HUMALOG KWIKPEN) 200 UNIT/ML soln, To be used in the insulin pump. Total daily dose up to 170 U.  insulin pen needle (BD ENE U/F) 32G X 4 MM miscellaneous, Use 1 pen needle 4 times daily or as directed.  ketorolac (ACULAR) 0.5 % ophthalmic solution, Place 1 drop into the right eye 4 times daily Start 2 days prior to surgery four times a day, after surgery: Four times a day x 1 week, three times a day x 1 week, twice a day x 1 week, daily x 1 week then stop  lidocaine (LIDODERM) 5 % patch, Place 1 patch onto the skin every 24 hours To prevent lidocaine toxicity, patient should be patch free for 12 hrs daily.  lidocaine (XYLOCAINE) 5 % external ointment, Apply topically as needed for moderate pain  losartan (COZAAR) 25 MG tablet, Take 1 tablet (25 mg) by mouth daily  methocarbamol (ROBAXIN) 500 MG tablet, Take 1 tablet (500 mg) by mouth 2 times daily as needed for muscle spasms  metoprolol succinate ER (TOPROL XL) 200 MG 24 hr tablet, Take 1 tablet (200 mg) by mouth daily  moxifloxacin (VIGAMOX) 0.5 % ophthalmic solution, Place 1 drop into the right eye 4 times daily Start 2 days prior to surgery four times a day, after surgery: Four times a day x 1 week  mycophenolic acid (GENERIC EQUIVALENT) 360 MG EC tablet, Take 2 tablets (720 mg) by mouth every 12 hours  oxyCODONE (ROXICODONE) 5 MG tablet,  Take 1 tablet (5 mg) by mouth 4 times daily as needed for pain maximum 6 tablet(s) per day  prednisoLONE acetate (PRED FORTE) 1 % ophthalmic suspension, Place 1-2 drops into the right eye 4 times daily after surgery: Four times a day x 1 week, three times a day x 1 week, twice a day x 1 week, daily x 1 week then stop  predniSONE (DELTASONE) 2.5 MG tablet, Take 1 tablet (2.5 mg) by mouth daily  sildenafil (REVATIO) 20 MG tablet, Take 2 tablets (40 mg) by mouth daily as needed (erectile dysfunction)  tacrolimus (GENERIC EQUIVALENT) 1 MG capsule, Take 4 capsules (4 mg) by mouth every morning AND 3 capsules (3 mg) every evening.  tretinoin (RETIN-A) 0.025 % external cream, Apply a pea-sized amount evenly over the face at nighttime before bed  triamcinolone (KENALOG) 0.1 % external ointment, Apply topically 2 times daily to the itching on the legs  ursodiol (ACTIGALL) 500 MG tablet, Take 1 tablet (500 mg) by mouth 2 times daily    No current facility-administered medications on file prior to visit.    FHx:  PSHx: Cataract extraction/intraocular lens right eye 2/1/24      Current Eye Medications:  Moxifloxacin/pred forte/ketorolac four times a day       Assessment & Plan:  (Z96.1) Pseudophakia, right eye  (primary encounter diagnosis)  Cataract extraction/intraocular lens right eye Roque 2/1/24    (H25.812) Combined forms of age-related cataract of left eye  Mild cataracts are present and may account for some of the patient's visual complaints. No treatment currently recommended. The patient will monitor for vision changes and contact us with any decrease in vision. Recheck next visit     (Z98.890) H/O laser assisted in situ keratomileusis  2003 with mild regression left eye     (H52.03,  H52.203,  H52.4) Hyperopia of both eyes with astigmatism and presbyopia  Hold pending Cataract extraction/iol    (E11.3213,  Z79.4) Type 2 diabetes mellitus with both eyes affected by mild nonproliferative retinopathy and macular  edema, with long-term current use of insulin (H)  Diagnosed 2003  Most recent HgBA1c 6.2 on 11/30/23  Mild background diabetic retinopathy without neovascularization noted on today's exam.  Discussed ocular and systemic benefits of blood pressure and blood sugar control.  Mild Diabetic macular edema noted  Good blood glucose control and best corrected visual acuity left eye 20/25+. Discussed possible need for injections in the future        Return for as scheduled.        Stan Roque MD     Attending Physician Attestation:  Complete documentation of historical and exam elements from today's encounter can be found in the full encounter summary report (not reduplicated in this progress note).  I personally obtained the chief complaint(s) and history of present illness.  I confirmed and edited as necessary the review of systems, past medical/surgical history, family history, social history, and examination findings as documented by others; and I examined the patient myself.  I personally reviewed the relevant tests, images, and reports as documented above.  I formulated and edited as necessary the assessment and plan and discussed the findings and management plan with the patient and family. - Stan Roque MD

## 2024-02-09 ENCOUNTER — OFFICE VISIT (OUTPATIENT)
Dept: ORTHOPEDICS | Facility: CLINIC | Age: 59
End: 2024-02-09
Payer: COMMERCIAL

## 2024-02-09 DIAGNOSIS — M77.41 METATARSALGIA OF BOTH FEET: ICD-10-CM

## 2024-02-09 DIAGNOSIS — E11.49 TYPE II OR UNSPECIFIED TYPE DIABETES MELLITUS WITH NEUROLOGICAL MANIFESTATIONS, NOT STATED AS UNCONTROLLED(250.60) (H): Primary | ICD-10-CM

## 2024-02-09 DIAGNOSIS — M77.42 METATARSALGIA OF BOTH FEET: ICD-10-CM

## 2024-02-09 DIAGNOSIS — L60.2 LONG TOENAIL: ICD-10-CM

## 2024-02-09 PROCEDURE — 11719 TRIM NAIL(S) ANY NUMBER: CPT | Performed by: PODIATRIST

## 2024-02-09 PROCEDURE — 99213 OFFICE O/P EST LOW 20 MIN: CPT | Mod: 25 | Performed by: PODIATRIST

## 2024-02-09 NOTE — LETTER
2/9/2024         RE: Camacho Bhagat  [address]  [facility] [id number]        Dear Colleague,    Thank you for referring your patient, Camacho Bhagat, to the [facility] [place]. Please see a copy of my visit note below.    Date of Service: 11/17/2023    Chief Complaint:   Chief Complaint   Patient presents with    Follow Up     3 month follow up.         HPI: Camacho is a 58 year old male who presents today for a diabetic foot eval and management. He has been diabetic for years. Has liver transplant. Notes that he is having pain in the outside of both feet. Has diabetic shoes and inserts.     Interval hx: he is doing well. Needs to get new diabetic shoes. He does have pain in the balls of both feet with walking.     Review of Systems: No n/v/d/f/c/ns/sob/cp    PMH:   Past Medical History:   Diagnosis Date    Cancer (H) 12/15    Stage 4 liver cancer    Diabetes type 2, controlled (H) 11/10/2016    Esophageal reflux     Fibromyalgia 01/2009    dx with Dr Benitez( Rheum)    Gangrene of finger (H) 08/25/2017    H/O deep venous thrombosis 11/2001    Permanent IVC filter in place.    H/O CASTAÑEDA (nonalcoholic steatohepatitis)     H/O Pneumonia, organism unspecified(486) 10/2001    Included ARDS, sepsis, and  acute renal failure; hospitalized, required tracheostomy placement.    H/O: HTN (hypertension) 11/2001    No longer prescribed antihypertensive medication.      History of hepatocellular carcinoma     History of liver transplant (H)     History of obstructive sleep apnea     No longer wears CPAP since losing approximately 200 pounds with his liver transplant and its complications.      HLD (hyperlipidemia)     Hypertension     Ischemia of both lower extremities 08/25/2017    Distal ischemia due to shock/high pressor requirements    Liver transplant rejection (H) 06/11/2018    Neutropenic colitis  (H24) 07/04/2017    Osteoarthritis     Presence of PERMANENT IVC filter     Rheumatoid  arthritis(714.0)     remission for many years       PSxH:   Past Surgical History:   Procedure Laterality Date    ARTHROSCOPY KNEE WITH MENISCAL REPAIR Right 2008    Right knee arthroscopy    BENCH LIVER N/A 03/04/2017    Procedure: BENCH LIVER;  Surgeon: Jovan Tran MD;  Location: [facility] OR    BIOPSY  5/2017    Liver    CHOLECYSTECTOMY      COLONOSCOPY N/A 07/21/2017    Procedure: COMBINED COLONOSCOPY, SINGLE OR MULTIPLE BIOPSY/POLYPECTOMY BY BIOPSY;  Colonoscopy;  Surgeon: Izaiah Montes MD;  Location: [facility]    COLONOSCOPY N/A 10/24/2022    Procedure: COLONOSCOPY, WITH POLYPECTOMY AND BIOPSY;  Surgeon: Abril Mcneill MD;  Location: [facility]    ENDOSCOPIC RETROGRADE CHOLANGIOPANCREATOGRAM N/A 05/22/2018    Procedure: COMBINED ENDOSCOPIC RETROGRADE CHOLANGIOPANCREATOGRAPHY, PLACE TUBE/STENT;  Endoscopic Retrograde Cholangiopancreatography with sphincterotomy and pancreatic duct stent placement;  Surgeon: Zay Gaitan MD;  Location: [facility] OR    ENT SURGERY      Repair of deviated septum    ESOPHAGOSCOPY, GASTROSCOPY, DUODENOSCOPY (EGD), COMBINED N/A 08/04/2016    Procedure: COMBINED ESOPHAGOSCOPY, GASTROSCOPY, DUODENOSCOPY (EGD), BIOPSY SINGLE OR MULTIPLE;  Surgeon: Trent Pederson MD;  Location:  GI    ESOPHAGOSCOPY, GASTROSCOPY, DUODENOSCOPY (EGD), COMBINED N/A 08/27/2017    Procedure: COMBINED ESOPHAGOSCOPY, GASTROSCOPY, DUODENOSCOPY (EGD);;  Surgeon: Los Wynn MD;  Location: [facility]    ESOPHAGOSCOPY, GASTROSCOPY, DUODENOSCOPY (EGD), COMBINED N/A 08/17/2023    Procedure: Esophagoscopy, gastroscopy, duodenoscopy (EGD), combined;  Surgeon: Trent Villanueva MD;  Location: [facility]    INCISION AND DRAINAGE ABDOMEN WASHOUT, COMBINED Right 02/14/2018    Procedure: COMBINED INCISION AND DRAINAGE ABDOMEN WASHOUT;  COMBINED INCISION AND DRAINAGE right ABDOMEN flank;  Surgeon: Sara Dinh MD;  Location: [facility]    LAPAROTOMY EXPLORATORY N/A 07/04/2017    Procedure: LAPAROTOMY  EXPLORATORY;  Exploratory Laparotomy, washout;  Surgeon: Tip Zhang MD;  Location: [facility]    LAPAROTOMY EXPLORATORY N/A 2017    Procedure: LAPAROTOMY EXPLORATORY;  Exploratory Laparotomy, Washout with closure.;  Surgeon: Tip Zhang MD;  Location: [facility]    LAPAROTOMY EXPLORATORY N/A 2017    Procedure: LAPAROTOMY EXPLORATORY;  Exploratory Laparotomy, Right angelique-colectomy, end ileostomy, mucosal fistula, partial omentectomy;  Surgeon: Sara Dinh MD;  Location: [facility]    LASIK      ORTHOPEDIC SURGERY Right     Repair of dislocated wrist.      PHACOEMULSIFICATION CLEAR CORNEA WITH STANDARD INTRAOCULAR LENS IMPLANT Right 2024    Procedure: RIGHT EYE PHACOEMULSIFICATION, COMPLEX CATARACT, WITH INTRAOCULAR LENS IMPLANT;  Surgeon: Stan Roque MD;  Location: [facility] OR    RELEASE TRIGGER FINGER Right 11/10/2017    Procedure: RELEASE TRIGGER FINGER;  Debride, V-Y Flap Right Index Finger;  Surgeon: Momo Noonan MD;  [facility]    TAKEDOWN ILEOSTOMY N/A 2018    Procedure: TAKEDOWN ILEOSTOMY;  Exploratory Laparotomy, Lysis of Adhesions, Takedown Of End Ileostomy, Takedown of mucocutaneous fistula, ileocolic resection  And Colorectal Anastomosis, Primary repair of Ventral Hernia, Anesthesia Block ;  Surgeon: Sara Dinh MD;  Location: [facility]    TRACHEOSTOMY  2001    During hospitalization for pneumonia.      TRANSPLANT LIVER RECIPIENT  DONOR N/A 2017    Procedure: TRANSPLANT LIVER RECIPIENT  DONOR;  Surgeon: Jovan Tran MD;  Location: [facility]       Allergies: Erythromycin, Losartan, and Vioxx    SH:   Social History     Socioeconomic History    Marital status:      Spouse name: Not on file    Number of children: 1    Years of education: Not on file    Highest education level: Not on file   Occupational History    Occupation:     Tobacco Use    Smoking status: Former     Packs/day:  0.50     Years: 1.00     Additional pack years: 0.00     Total pack years: 0.50     Types: Cigarettes, Cigars, Dip, chew, snus or snuff     Start date: 1987     Quit date: 1987     Years since quittin.6    Smokeless tobacco: Former     Types: Chew     Quit date: 10/8/2015    Tobacco comments:     1 Cigar once every other month.   Substance and Sexual Activity    Alcohol use: Not Currently     Comment: No alcohol since liver transplant.      Drug use: Never    Sexual activity: Yes     Partners: Female     Birth control/protection: None   Other Topics Concern    Parent/sibling w/ CABG, MI or angioplasty before 65F 55M? 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         Social Determinants of Health     Financial Resource Strain: Low Risk  (2024)    Financial Resource Strain     Within the past 12 months, have you or your family members you live with been unable to get utilities (heat, electricity) when it was really needed?: No   Food Insecurity: Low Risk  (2024)    Food Insecurity     Within the past 12 months, did you worry that your food would run out before you got money to buy more?: No     Within the past 12 months, did the food you bought just not last and you didn t have money to get more?: No   Transportation Needs: Low Risk  (2024)    Transportation Needs     Within the past 12 months, has lack of transportation kept you from medical appointments, getting your medicines, non-medical meetings or appointments, work, or from getting things that you need?: No   Physical Activity: Not on file   Stress: Not on file   Social Connections: Not on file   Interpersonal Safety: Low Risk  (2024)    Interpersonal Safety     Do you feel physically and emotionally safe where you currently live?: Yes     Within the past 12 months, have you been hit, slapped, kicked or otherwise physically hurt by someone?: No     Within the past 12 months, have you  been humiliated or emotionally abused in other ways by your partner or ex-partner?: No   Housing Stability: Low Risk  (1/19/2024)    Housing Stability     Do you have housing? : Yes     Are you worried about losing your housing?: No       FH:   Family History   Problem Relation Age of Onset    Arthritis Mother     Thyroid Cancer Mother         Survivor!    Thyroid Disease Mother         Thyroid cancer    Diabetes Father     Substance Abuse Father     Cervical Cancer Maternal Grandmother     Cerebrovascular Disease Maternal Grandfather     No Known Problems Paternal Grandmother     Prostate Cancer Paternal Grandfather     Colon Cancer No family hx of     Hyperlipidemia No family hx of     Coronary Artery Disease No family hx of     Breast Cancer No family hx of     Glaucoma No family hx of     Hypertension No family hx of        Objective:  Data Unavailable Data Unavailable Data Unavailable Data Unavailable Data Unavailable 0 lbs 0 oz    PT and DP pulses are 2/4 bilaterally. CRT is instant. Diminished pedal hair.   Gross sensation is diminished bilaterally. Protective sensation is diminished as demonstrated with a 5.07G monofilament.   Equinus is noted bilaterally. No pain with active or passive ROM of the ankle, MTJ, 1st ray, or halluces bilaterally,. When standing on the inserts, he is being rolled outward, onto the 5th met heads.   Nails elongated bilaterally. No open lesions are noted.     Assessment:   Encounter Diagnoses   Name Primary?    Type II or unspecified type diabetes mellitus with neurological manifestations, not stated as uncontrolled(250.60) (H) Yes    Long toenail     Metatarsalgia of both feet              Plan:  - Pt seen and evaluated  - Orders were written to adjust the orthotics and for a new set.   - He should not trim his nails 2/2 neuropathy. Nails trimmed today x 10.   - Moisturize daily.  - See again in 3 months.            Leo Joshi DPM

## 2024-02-09 NOTE — PROGRESS NOTES
Date of Service: 11/17/2023    Chief Complaint:   Chief Complaint   Patient presents with    Follow Up     3 month follow up.         HPI: Camacho is a 58 year old male who presents today for a diabetic foot eval and management. He has been diabetic for years. Has liver transplant. Notes that he is having pain in the outside of both feet. Has diabetic shoes and inserts.     Interval hx: he is doing well. Needs to get new diabetic shoes. He does have pain in the balls of both feet with walking.     Review of Systems: No n/v/d/f/c/ns/sob/cp    PMH:   Past Medical History:   Diagnosis Date    Cancer (H) 12/15    Stage 4 liver cancer    Diabetes type 2, controlled (H) 11/10/2016    Esophageal reflux     Fibromyalgia 01/2009    dx with Dr Benitez( Rhe[facility])    Gangrene of finger (H) 08/25/2017    H/O deep venous thrombosis 11/2001    Permanent IVC filter in place.    H/O CASTAÑEDA (nonalcoholic steatohepatitis)     H/O Pneumonia, organism unspecified(486) 10/2001    Included ARDS, sepsis, and  acute renal failure; hospitalized, required tracheostomy placement.    H/O: HTN (hypertension) 11/2001    No longer prescribed antihypertensive medication.      History of hepatocellular carcinoma     History of liver transplant (H)     History of obstructive sleep apnea     No longer wears CPAP since losing approximately 200 pounds with his liver transplant and its complications.      HLD (hyperlipidemia)     Hypertension     Ischemia of both lower extremities 08/25/2017    Distal ischemia due to shock/high pressor requirements    Liver transplant rejection (H) 06/11/2018    Neutropenic colitis  (H24) 07/04/2017    Osteoarthritis     Presence of PERMANENT IVC filter     Rheumatoid arthritis(714.0)     remission for many years       PSxH:   Past Surgical History:   Procedure Laterality Date    ARTHROSCOPY KNEE WITH MENISCAL REPAIR Right 2008    Right knee arthroscopy    BENCH LIVER N/A 03/04/2017    Procedure: BENCH LIVER;  Surgeon:  Jovan Tran MD;  Location: [facility]    BIOPSY  5/2017    Liver    CHOLECYSTECTOMY      COLONOSCOPY N/A 07/21/2017    Procedure: COMBINED COLONOSCOPY, SINGLE OR MULTIPLE BIOPSY/POLYPECTOMY BY BIOPSY;  Colonoscopy;  Surgeon: Izaiah Montes MD;  Location: [facility]    COLONOSCOPY N/A 10/24/2022    Procedure: COLONOSCOPY, WITH POLYPECTOMY AND BIOPSY;  Surgeon: Abril Mcneill MD;  Location: [facility]    ENDOSCOPIC RETROGRADE CHOLANGIOPANCREATOGRAM N/A 05/22/2018    Procedure: COMBINED ENDOSCOPIC RETROGRADE CHOLANGIOPANCREATOGRAPHY, PLACE TUBE/STENT;  Endoscopic Retrograde Cholangiopancreatography with sphincterotomy and pancreatic duct stent placement;  Surgeon: Zay Gaitan MD;  Location: [facility]    ENT SURGERY      Repair of deviated septum    ESOPHAGOSCOPY, GASTROSCOPY, DUODENOSCOPY (EGD), COMBINED N/A 08/04/2016    Procedure: COMBINED ESOPHAGOSCOPY, GASTROSCOPY, DUODENOSCOPY (EGD), BIOPSY SINGLE OR MULTIPLE;  Surgeon: Trent Pederson MD;  Location:  GI    ESOPHAGOSCOPY, GASTROSCOPY, DUODENOSCOPY (EGD), COMBINED N/A 08/27/2017    Procedure: COMBINED ESOPHAGOSCOPY, GASTROSCOPY, DUODENOSCOPY (EGD);;  Surgeon: Los Wynn MD;  [facility]    ESOPHAGOSCOPY, GASTROSCOPY, DUODENOSCOPY (EGD), COMBINED N/A 08/17/2023    Procedure: Esophagoscopy, gastroscopy, duodenoscopy (EGD), combined;  Surgeon: Trent Villanueva MD;  Location: [facility]    INCISION AND DRAINAGE ABDOMEN WASHOUT, COMBINED Right 02/14/2018    Procedure: COMBINED INCISION AND DRAINAGE ABDOMEN WASHOUT;  COMBINED INCISION AND DRAINAGE right ABDOMEN flank;  Surgeon: Sara Dinh MD;  Location: [facility]    LAPAROTOMY EXPLORATORY N/A 07/04/2017    Procedure: LAPAROTOMY EXPLORATORY;  Exploratory Laparotomy, washout;  Surgeon: Tip Zhang MD;  Location: [facility]    LAPAROTOMY EXPLORATORY N/A 07/05/2017    Procedure: LAPAROTOMY EXPLORATORY;  Exploratory Laparotomy, Washout with closure.;  Surgeon: Tip Zhang MD;   [facility]: [facility]    LAPAROTOMY EXPLORATORY N/A 2017    Procedure: LAPAROTOMY EXPLORATORY;  Exploratory Laparotomy, Right angelique-colectomy, end ileostomy, mucosal fistula, partial omentectomy;  Surgeon: Sara Dinh MD;  Location: [facility]    LASIK      ORTHOPEDIC SURGERY Right     Repair of dislocated wrist.      PHACOEMULSIFICATION CLEAR CORNEA WITH STANDARD INTRAOCULAR LENS IMPLANT Right 2024    Procedure: RIGHT EYE PHACOEMULSIFICATION, COMPLEX CATARACT, WITH INTRAOCULAR LENS IMPLANT;  Surgeon: Stan Roque MD;  Location: [facility] OR    RELEASE TRIGGER FINGER Right 11/10/2017    Procedure: RELEASE TRIGGER FINGER;  Debride, V-Y Flap Right Index Finger;  Surgeon: Momo Noonan MD;  [facility]    TAKEDOWN ILEOSTOMY N/A 2018    Procedure: TAKEDOWN ILEOSTOMY;  Exploratory Laparotomy, Lysis of Adhesions, Takedown Of End Ileostomy, Takedown of mucocutaneous fistula, ileocolic resection  And Colorectal Anastomosis, Primary repair of Ventral Hernia, Anesthesia Block ;  Surgeon: Sara Dinh MD;  Location: [facility]    TRACHEOSTOMY  2001    During hospitalization for pneumonia.      TRANSPLANT LIVER RECIPIENT  DONOR N/A 2017    Procedure: TRANSPLANT LIVER RECIPIENT  DONOR;  Surgeon: Jovan Tran MD;  Location: [facility]       Allergies: Erythromycin, Losartan, and Vioxx    SH:   Social History     Socioeconomic History    Marital status:      Spouse name: Not on file    Number of children: 1    Years of education: Not on file    Highest education level: Not on file   Occupational History    Occupation:     Tobacco Use    Smoking status: Former     Packs/day: 0.50     Years: 1.00     Additional pack years: 0.00     Total pack years: 0.50     Types: Cigarettes, Cigars, Dip, chew, snus or snuff     Start date: 1987     Quit date: 1987     Years since quittin.6    Smokeless tobacco: Former     Types: Chew      Quit date: 10/8/2015    Tobacco comments:     1 Cigar once every other month.   Substance and Sexual Activity    Alcohol use: Not Currently     Comment: No alcohol since liver transplant.      Drug use: Never    Sexual activity: Yes     Partners: Female     Birth control/protection: None   Other Topics Concern    Parent/sibling w/ CABG, MI or angioplasty before 65F 55M? 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         Social Determinants of Health     Financial Resource Strain: Low Risk  (1/19/2024)    Financial Resource Strain     Within the past 12 months, have you or your family members you live with been unable to get utilities (heat, electricity) when it was really needed?: No   Food Insecurity: Low Risk  (1/19/2024)    Food Insecurity     Within the past 12 months, did you worry that your food would run out before you got money to buy more?: No     Within the past 12 months, did the food you bought just not last and you didn t have money to get more?: No   Transportation Needs: Low Risk  (1/19/2024)    Transportation Needs     Within the past 12 months, has lack of transportation kept you from medical appointments, getting your medicines, non-medical meetings or appointments, work, or from getting things that you need?: No   Physical Activity: Not on file   Stress: Not on file   Social Connections: Not on file   Interpersonal Safety: Low Risk  (1/24/2024)    Interpersonal Safety     Do you feel physically and emotionally safe where you currently live?: Yes     Within the past 12 months, have you been hit, slapped, kicked or otherwise physically hurt by someone?: No     Within the past 12 months, have you been humiliated or emotionally abused in other ways by your partner or ex-partner?: No   Housing Stability: Low Risk  (1/19/2024)    Housing Stability     Do you have housing? : Yes     Are you worried about losing your housing?: No       FH:   Family History    Problem Relation Age of Onset    Arthritis Mother     Thyroid Cancer Mother         Survivor!    Thyroid Disease Mother         Thyroid cancer    Diabetes Father     Substance Abuse Father     Cervical Cancer Maternal Grandmother     Cerebrovascular Disease Maternal Grandfather     No Known Problems Paternal Grandmother     Prostate Cancer Paternal Grandfather     Colon Cancer No family hx of     Hyperlipidemia No family hx of     Coronary Artery Disease No family hx of     Breast Cancer No family hx of     Glaucoma No family hx of     Hypertension No family hx of        Objective:  Data Unavailable Data Unavailable Data Unavailable Data Unavailable Data Unavailable 0 lbs 0 oz    PT and DP pulses are 2/4 bilaterally. CRT is instant. Diminished pedal hair.   Gross sensation is diminished bilaterally. Protective sensation is diminished as demonstrated with a 5.07G monofilament.   Equinus is noted bilaterally. No pain with active or passive ROM of the ankle, MTJ, 1st ray, or halluces bilaterally,. When standing on the inserts, he is being rolled outward, onto the 5th met heads.   Nails elongated bilaterally. No open lesions are noted.     Assessment:   Encounter Diagnoses   Name Primary?    Type II or unspecified type diabetes mellitus with neurological manifestations, not stated as uncontrolled(250.60) (H) Yes    Long toenail     Metatarsalgia of both feet              Plan:  - Pt seen and evaluated  - Orders were written to adjust the orthotics and for a new set.   - He should not trim his nails 2/2 neuropathy. Nails trimmed today x 10.   - Moisturize daily.  - See again in 3 months.

## 2024-02-12 ENCOUNTER — OFFICE VISIT (OUTPATIENT)
Dept: INTERNAL MEDICINE | Facility: CLINIC | Age: 59
End: 2024-02-12
Payer: COMMERCIAL

## 2024-02-12 ENCOUNTER — MYC REFILL (OUTPATIENT)
Dept: GASTROENTEROLOGY | Facility: CLINIC | Age: 59
End: 2024-02-12

## 2024-02-12 VITALS
BODY MASS INDEX: 39.39 KG/M2 | HEART RATE: 61 BPM | DIASTOLIC BLOOD PRESSURE: 69 MMHG | SYSTOLIC BLOOD PRESSURE: 148 MMHG | WEIGHT: 290.4 LBS | OXYGEN SATURATION: 97 %

## 2024-02-12 DIAGNOSIS — M15.0 PRIMARY OSTEOARTHRITIS INVOLVING MULTIPLE JOINTS: ICD-10-CM

## 2024-02-12 DIAGNOSIS — M51.369 DDD (DEGENERATIVE DISC DISEASE), LUMBAR: ICD-10-CM

## 2024-02-12 DIAGNOSIS — Z79.4 TYPE 2 DIABETES MELLITUS WITH DIABETIC POLYNEUROPATHY, WITH LONG-TERM CURRENT USE OF INSULIN (H): ICD-10-CM

## 2024-02-12 DIAGNOSIS — M51.16 LUMBAR DISC HERNIATION WITH RADICULOPATHY: Primary | ICD-10-CM

## 2024-02-12 DIAGNOSIS — Z94.4 S/P LIVER TRANSPLANT (H): ICD-10-CM

## 2024-02-12 DIAGNOSIS — Z94.4 LIVER REPLACED BY TRANSPLANT (H): ICD-10-CM

## 2024-02-12 DIAGNOSIS — E11.42 TYPE 2 DIABETES MELLITUS WITH DIABETIC POLYNEUROPATHY, WITH LONG-TERM CURRENT USE OF INSULIN (H): ICD-10-CM

## 2024-02-12 DIAGNOSIS — N18.31 STAGE 3A CHRONIC KIDNEY DISEASE (H): ICD-10-CM

## 2024-02-12 PROBLEM — A09 DIARRHEA OF INFECTIOUS ORIGIN: Status: RESOLVED | Noted: 2019-03-25 | Resolved: 2024-02-12

## 2024-02-12 PROCEDURE — 99214 OFFICE O/P EST MOD 30 MIN: CPT | Performed by: INTERNAL MEDICINE

## 2024-02-12 RX ORDER — METHOCARBAMOL 500 MG/1
500 TABLET, FILM COATED ORAL 2 TIMES DAILY PRN
Qty: 60 TABLET | Refills: 0 | Status: SHIPPED | OUTPATIENT
Start: 2024-02-12

## 2024-02-12 RX ORDER — OXYCODONE HYDROCHLORIDE 5 MG/1
5 TABLET ORAL 4 TIMES DAILY PRN
Qty: 30 TABLET | Refills: 0 | Status: SHIPPED | OUTPATIENT
Start: 2024-02-12

## 2024-02-12 NOTE — PATIENT INSTRUCTIONS
Check with insurance on the Shingrix (shingles vaccine) - where do they want you to get it and will they cover it.

## 2024-02-12 NOTE — PROGRESS NOTES
ASSESSMENT/PLAN:  Diabetes Type II - seen 2/5/24 by mele, on insulin pump and empagliflozin. He is really l liking the control he has with the pump and a continuous glucose monitor.    DM neuropathy- well managed. gabapentin 600mg TID. He has foot problems for which he will get new orthopedic shoes but these are different than the neuropathy. He will get some knee pain from the malrotation of the foot that hopefully prevent that from progressing.    DM nephrology - was on losartan but developed high potassium so restarted a lower dose of the losartan, on lasix, amlod, Toprol X, also on Jardiance.  His blood pressure today is high however he has been much higher recently so this is actually better.    He had cataract surgery 2/1/24 and really enjoys the improved vision that he has    Lumbar degen disc disease with disc herniation - seen by sports med 11/17/22 and arranged for steroid injection, has rare oxycodone use.  In fact the last prescription of oxycodone lasted 10 months for 30 pills.  He does also use methocarbamol rarely.  Both the oxycodone and the methocarbamol refilled today.    History of liver transplant secondary to CASTAÑEDA cirrhosis and carcinoma    Shay Kirkpatrick MD, FACP      Chief complaint:  Camacho Bhagat is a 58 year old male presents for   Chief Complaint   Patient presents with    Follow Up        SUBJECTIVE:  He had cataract surgery on the right and is very happy with it. He has some changes on the left eye.    He has muscular and bone pain in the feet more than the neuropathy. He has seen podiatry with new shoes ordered to shift the weight since he has lost some of the fat pad cushion.    Medications and allergies were reviewed by me today.     PHYSICAL EXAM:  BP (!) 148/69 (BP Location: Right arm, Patient Position: Sitting, Cuff Size: Adult Large)   Pulse 61   Wt 131.7 kg (290 lb 6.4 oz)   SpO2 97%   BMI 39.39 kg/m    Constitutional: no distress, comfortable, pleasant   Cardiovascular: regular  rate and rhythm, normal S1 and S2, no murmurs, rubs or gallops

## 2024-02-12 NOTE — PROGRESS NOTES
Camacho is a 58 year old that presents in clinic today for the following:     Chief Complaint   Patient presents with    Follow Up           2/12/2024     7:19 AM   Additional Questions   Roomed by YW     Screenings from encounters over the past 10 days    No data recorded       MANNIE Benton at 7:21 AM on 2/12/2024

## 2024-02-13 RX ORDER — URSODIOL 500 MG/1
500 TABLET, FILM COATED ORAL 2 TIMES DAILY
Qty: 180 TABLET | Refills: 3 | Status: SHIPPED | OUTPATIENT
Start: 2024-02-13

## 2024-02-23 DIAGNOSIS — Q66.6 CONGENITAL REARFOOT VALGUS: ICD-10-CM

## 2024-02-23 DIAGNOSIS — E11.49 TYPE II OR UNSPECIFIED TYPE DIABETES MELLITUS WITH NEUROLOGICAL MANIFESTATIONS, NOT STATED AS UNCONTROLLED(250.60) (H): ICD-10-CM

## 2024-02-23 DIAGNOSIS — M77.41 METATARSALGIA OF BOTH FEET: Primary | ICD-10-CM

## 2024-02-23 DIAGNOSIS — M77.42 METATARSALGIA OF BOTH FEET: Primary | ICD-10-CM

## 2024-02-28 ENCOUNTER — OFFICE VISIT (OUTPATIENT)
Dept: OPHTHALMOLOGY | Facility: CLINIC | Age: 59
End: 2024-02-28
Attending: OPHTHALMOLOGY
Payer: COMMERCIAL

## 2024-02-28 DIAGNOSIS — Z98.890 H/O LASER ASSISTED IN SITU KERATOMILEUSIS: ICD-10-CM

## 2024-02-28 DIAGNOSIS — Z96.1 PSEUDOPHAKIA, RIGHT EYE: Primary | ICD-10-CM

## 2024-02-28 DIAGNOSIS — H04.129 DRY EYE: ICD-10-CM

## 2024-02-28 PROCEDURE — 99024 POSTOP FOLLOW-UP VISIT: CPT | Performed by: OPHTHALMOLOGY

## 2024-02-28 PROCEDURE — 99214 OFFICE O/P EST MOD 30 MIN: CPT | Performed by: OPHTHALMOLOGY

## 2024-02-28 ASSESSMENT — REFRACTION_WEARINGRX
OD_CYLINDER: +1.00
OS_CYLINDER: +0.50
OD_SPHERE: -0.50
OD_AXIS: 016
OS_ADD: +1.60
OS_SPHERE: +1.00
OS_AXIS: 165
OD_ADD: 1.61
SPECS_TYPE: PAL

## 2024-02-28 ASSESSMENT — REFRACTION_MANIFEST
OS_ADD: +2.25
OD_ADD: +2.25
OS_CYLINDER: +0.25
OD_CYLINDER: SPHERE
OS_SPHERE: +1.50
OS_AXIS: 006
OD_SPHERE: -0.25

## 2024-02-28 ASSESSMENT — VISUAL ACUITY
OD_SC+: -2
OS_CC: 20/30
OD_SC: 20/20
CORRECTION_TYPE: GLASSES
METHOD: SNELLEN - LINEAR

## 2024-02-28 ASSESSMENT — TONOMETRY
OD_IOP_MMHG: 15
IOP_METHOD: TONOPEN
OS_IOP_MMHG: 14

## 2024-02-28 ASSESSMENT — SLIT LAMP EXAM - LIDS
COMMENTS: NORMAL
COMMENTS: NORMAL

## 2024-02-28 ASSESSMENT — CUP TO DISC RATIO: OD_RATIO: 0.1

## 2024-02-28 ASSESSMENT — EXTERNAL EXAM - RIGHT EYE: OD_EXAM: NORMAL

## 2024-02-28 NOTE — PROGRESS NOTES
HPI       Post Op (Ophthalmology) Right Eye    In right eye.  This started 3 weeks ago.             Comments    S/p CE/IOL RE-Pt. States that he is doing well. VA is much improved RE. No pain or dryness BE. No flashes or floaters BE  Sonia Chiu COT 7:24 AM February 28, 2024             Last edited by Sonia Chiu on 2/28/2024  7:24 AM.         Review of systems for the eyes was negative other than the pertinent positives/negatives listed in the HPI.      Assessment & Plan    HPI:  Camacho Bhagat is a 58 year old male with history of T2DM, HTN, HLD, ED, liver transplant, laser in situ keratomileusis (LASIK) presents for final post-op right eye. Doing well. Has noticed dry eye symptoms much improved right eye     POHx: lasik  PMHx: 2DM, HTN, HLD, ED, liver transplant  Current Medications: alcohol swab prep pads, Use to swab area of injection/francisca as directed.  alpha-lipoic acid 600 MG capsule, Take 600 mg by mouth  amLODIPine (NORVASC) 10 MG tablet, Take 1 tablet (10 mg) by mouth daily  augmented betamethasone dipropionate (DIPROLENE-AF) 0.05 % external ointment, Apply twice daily as needed for rash on the legs  cholecalciferol (VITAMIN D3) 1000 UNIT tablet, Take 1 tablet (1,000 Units) by mouth daily (Patient taking differently: Take 1,000 Units by mouth every morning)  Continuous Blood Gluc Sensor (DEXCOM G6 SENSOR) MISC, Change every 10 days.  Continuous Blood Gluc Transmit (DEXCOM G6 TRANSMITTER) MISC, Change every 3 months.  cyclobenzaprine (FLEXERIL) 10 MG tablet, Take 1 tablet (10 mg) by mouth 3 times daily as needed for muscle spasms  doxazosin (CARDURA) 8 MG tablet, Take 1 tablet (8 mg) by mouth at bedtime Take a total of 10 mg at bedtime  empagliflozin (JARDIANCE) 10 MG TABS tablet, Take 1 tablet (10 mg) by mouth daily  fluticasone (FLONASE) 50 MCG/ACT nasal spray, Spray 1 spray into both nostrils daily  furosemide (LASIX) 40 MG tablet, Take 1.5 tablets (60 mg) by mouth 2 times daily .  gabapentin  (NEURONTIN) 800 MG tablet, Take 1 tablet (800 mg) by mouth 3 times daily  hydrOXYzine (ATARAX) 25 MG tablet, Take 1 tablet (25 mg) by mouth 3 times daily as needed for itching  Insulin Disposable Pump (OMNIPOD 5 G6 INTRO, GEN 5,) KIT, 1 kit daily  Insulin Disposable Pump (OMNIPOD 5 G6 POD, GEN 5,) MISC, 1 each See Admin Instructions Change every 2 days. Use for insulin administration.  Insulin Disposable Pump (OMNIPOD 5 G6 POD, GEN 5,) MISC, 1 each See Admin Instructions Use per 's instructions.  Insulin Lispro (HUMALOG KWIKPEN) 200 UNIT/ML soln, To be used in the insulin pump. Total daily dose up to 170 U.  insulin pen needle (BD ENE U/F) 32G X 4 MM miscellaneous, Use 1 pen needle 4 times daily or as directed.  ketorolac (ACULAR) 0.5 % ophthalmic solution, Place 1 drop into the right eye 4 times daily Start 2 days prior to surgery four times a day, after surgery: Four times a day x 1 week, three times a day x 1 week, twice a day x 1 week, daily x 1 week then stop  lidocaine (LIDODERM) 5 % patch, Place 1 patch onto the skin every 24 hours To prevent lidocaine toxicity, patient should be patch free for 12 hrs daily.  lidocaine (XYLOCAINE) 5 % external ointment, Apply topically as needed for moderate pain  losartan (COZAAR) 25 MG tablet, Take 1 tablet (25 mg) by mouth daily  methocarbamol (ROBAXIN) 500 MG tablet, Take 1 tablet (500 mg) by mouth 2 times daily as needed for muscle spasms  metoprolol succinate ER (TOPROL XL) 200 MG 24 hr tablet, Take 1 tablet (200 mg) by mouth daily  moxifloxacin (VIGAMOX) 0.5 % ophthalmic solution, Place 1 drop into the right eye 4 times daily Start 2 days prior to surgery four times a day, after surgery: Four times a day x 1 week  mycophenolic acid (GENERIC EQUIVALENT) 360 MG EC tablet, Take 2 tablets (720 mg) by mouth every 12 hours  oxyCODONE (ROXICODONE) 5 MG tablet, Take 1 tablet (5 mg) by mouth 4 times daily as needed for pain maximum 6 tablet(s) per day  prednisoLONE  acetate (PRED FORTE) 1 % ophthalmic suspension, Place 1-2 drops into the right eye 4 times daily after surgery: Four times a day x 1 week, three times a day x 1 week, twice a day x 1 week, daily x 1 week then stop  predniSONE (DELTASONE) 2.5 MG tablet, Take 1 tablet (2.5 mg) by mouth daily  sildenafil (REVATIO) 20 MG tablet, Take 2 tablets (40 mg) by mouth daily as needed (erectile dysfunction)  tacrolimus (GENERIC EQUIVALENT) 1 MG capsule, Take 4 capsules (4 mg) by mouth every morning AND 3 capsules (3 mg) every evening.  tretinoin (RETIN-A) 0.025 % external cream, Apply a pea-sized amount evenly over the face at nighttime before bed  triamcinolone (KENALOG) 0.1 % external ointment, Apply topically 2 times daily to the itching on the legs  ursodiol (ACTIGALL) 500 MG tablet, Take 1 tablet (500 mg) by mouth 2 times daily    No current facility-administered medications on file prior to visit.    PSHx: Cataract extraction/intraocular lens right eye 2/1/24      Current Eye Medications:  pred forte/ketorolac once daily     Assessment & Plan:  (Z96.1) Pseudophakia, right eye  (primary encounter diagnosis)  Cataract extraction/intraocular lens right eye Roque 2/1/24    (H25.812) Combined forms of age-related cataract of left eye  Mild cataracts are present and may account for some of the patient's visual complaints. No treatment currently recommended. The patient will monitor for vision changes and contact us with any decrease in vision. Recheck next visit     (Z98.890) H/O laser assisted in situ keratomileusis  2003 with mild regression left eye     (H52.03,  H52.203,  H52.4) Hyperopia of both eyes with astigmatism and presbyopia  Patient has change in refractive error and a copy of today's glasses prescription was given.  The patient may wish to update the glasses if the lenses are scratched or the frames are too small.  Presbyopia is difficulty seeing up close and is treated with bifocals or over the counter reading  glasses      (E11.3213,  Z79.4) Type 2 diabetes mellitus with both eyes affected by mild nonproliferative retinopathy and macular edema, with long-term current use of insulin (H)  Diagnosed 2003  Most recent HgBA1c 6.2 on 11/30/23  Mild background diabetic retinopathy without neovascularization noted on today's exam.  Discussed ocular and systemic benefits of blood pressure and blood sugar control.  Mild Diabetic macular edema noted  Good blood glucose control and best corrected visual acuity left eye 20/25+. Discussed possible need for injections in the future  Return to clinic 3-4 months, v/t/father/oct macula    (H04.129) Dry eye  Recommend artificial tears four times a day    May benefit from restasis/xiidra given improvement right eye with steroid post operatively        Return in about 3 months (around 5/28/2024) for OCT Macula, Follow Up-v/t/d.        Stan Roque MD     Attending Physician Attestation:  Complete documentation of historical and exam elements from today's encounter can be found in the full encounter summary report (not reduplicated in this progress note).  I personally obtained the chief complaint(s) and history of present illness.  I confirmed and edited as necessary the review of systems, past medical/surgical history, family history, social history, and examination findings as documented by others; and I examined the patient myself.  I personally reviewed the relevant tests, images, and reports as documented above.  I formulated and edited as necessary the assessment and plan and discussed the findings and management plan with the patient and family. - Stan Roque MD

## 2024-02-28 NOTE — NURSING NOTE
Chief Complaints and History of Present Illnesses   Patient presents with    Post Op (Ophthalmology) Right Eye     Chief Complaint(s) and History of Present Illness(es)       Post Op (Ophthalmology) Right Eye              Laterality: right eye    Onset: 3 weeks ago              Comments    S/p CE/IOL RE-Pt. States that he is doing well. VA is much improved RE. No pain or dryness BE. No flashes or floaters BE  Sonia Chiu COT 7:24 AM February 28, 2024

## 2024-03-01 ENCOUNTER — LAB (OUTPATIENT)
Dept: LAB | Facility: CLINIC | Age: 59
End: 2024-03-01
Payer: COMMERCIAL

## 2024-03-01 DIAGNOSIS — N18.31 STAGE 3A CHRONIC KIDNEY DISEASE (H): ICD-10-CM

## 2024-03-01 DIAGNOSIS — E11.42 TYPE 2 DIABETES MELLITUS WITH DIABETIC POLYNEUROPATHY, WITH LONG-TERM CURRENT USE OF INSULIN (H): ICD-10-CM

## 2024-03-01 DIAGNOSIS — Z79.4 TYPE 2 DIABETES MELLITUS WITH DIABETIC POLYNEUROPATHY, WITH LONG-TERM CURRENT USE OF INSULIN (H): ICD-10-CM

## 2024-03-01 DIAGNOSIS — Z94.4 LIVER REPLACED BY TRANSPLANT (H): ICD-10-CM

## 2024-03-01 LAB
ALBUMIN SERPL BCG-MCNC: 4.2 G/DL (ref 3.5–5.2)
ALP SERPL-CCNC: 144 U/L (ref 40–150)
ALT SERPL W P-5'-P-CCNC: 23 U/L (ref 0–70)
ANION GAP SERPL CALCULATED.3IONS-SCNC: 10 MMOL/L (ref 7–15)
AST SERPL W P-5'-P-CCNC: 24 U/L (ref 0–45)
BILIRUB DIRECT SERPL-MCNC: <0.2 MG/DL (ref 0–0.3)
BILIRUB SERPL-MCNC: 0.3 MG/DL
BUN SERPL-MCNC: 49.4 MG/DL (ref 8–23)
CALCIUM SERPL-MCNC: 8.9 MG/DL (ref 8.6–10)
CHLORIDE SERPL-SCNC: 107 MMOL/L (ref 98–107)
CREAT SERPL-MCNC: 1.93 MG/DL (ref 0.67–1.17)
DEPRECATED HCO3 PLAS-SCNC: 20 MMOL/L (ref 22–29)
EGFRCR SERPLBLD CKD-EPI 2021: 39 ML/MIN/1.73M2
ERYTHROCYTE [DISTWIDTH] IN BLOOD BY AUTOMATED COUNT: 13.2 % (ref 10–15)
GLUCOSE SERPL-MCNC: 101 MG/DL (ref 70–99)
HBA1C MFR BLD: 5.8 %
HCT VFR BLD AUTO: 31 % (ref 40–53)
HGB BLD-MCNC: 10.4 G/DL (ref 13.3–17.7)
MCH RBC QN AUTO: 30.9 PG (ref 26.5–33)
MCHC RBC AUTO-ENTMCNC: 33.5 G/DL (ref 31.5–36.5)
MCV RBC AUTO: 92 FL (ref 78–100)
PLATELET # BLD AUTO: 98 10E3/UL (ref 150–450)
POTASSIUM SERPL-SCNC: 5.4 MMOL/L (ref 3.4–5.3)
PROT SERPL-MCNC: 6.5 G/DL (ref 6.4–8.3)
PTH-INTACT SERPL-MCNC: 65 PG/ML (ref 15–65)
RBC # BLD AUTO: 3.37 10E6/UL (ref 4.4–5.9)
SODIUM SERPL-SCNC: 137 MMOL/L (ref 135–145)
TACROLIMUS BLD-MCNC: <1 UG/L (ref 5–15)
TME LAST DOSE: ABNORMAL H
TME LAST DOSE: ABNORMAL H
WBC # BLD AUTO: 4.6 10E3/UL (ref 4–11)

## 2024-03-01 PROCEDURE — 85027 COMPLETE CBC AUTOMATED: CPT | Performed by: PATHOLOGY

## 2024-03-01 PROCEDURE — 80053 COMPREHEN METABOLIC PANEL: CPT | Performed by: PATHOLOGY

## 2024-03-01 PROCEDURE — 80197 ASSAY OF TACROLIMUS: CPT | Performed by: INTERNAL MEDICINE

## 2024-03-01 PROCEDURE — 83970 ASSAY OF PARATHORMONE: CPT | Performed by: PATHOLOGY

## 2024-03-01 PROCEDURE — 36415 COLL VENOUS BLD VENIPUNCTURE: CPT | Performed by: PATHOLOGY

## 2024-03-01 PROCEDURE — 99000 SPECIMEN HANDLING OFFICE-LAB: CPT | Performed by: PATHOLOGY

## 2024-03-01 PROCEDURE — 82248 BILIRUBIN DIRECT: CPT | Performed by: PATHOLOGY

## 2024-03-01 PROCEDURE — 83036 HEMOGLOBIN GLYCOSYLATED A1C: CPT | Performed by: INTERNAL MEDICINE

## 2024-03-04 ENCOUNTER — OFFICE VISIT (OUTPATIENT)
Dept: GASTROENTEROLOGY | Facility: CLINIC | Age: 59
End: 2024-03-04
Attending: INTERNAL MEDICINE
Payer: COMMERCIAL

## 2024-03-04 ENCOUNTER — TELEPHONE (OUTPATIENT)
Dept: TRANSPLANT | Facility: CLINIC | Age: 59
End: 2024-03-04
Payer: COMMERCIAL

## 2024-03-04 VITALS
DIASTOLIC BLOOD PRESSURE: 83 MMHG | WEIGHT: 290.2 LBS | BODY MASS INDEX: 39.36 KG/M2 | OXYGEN SATURATION: 98 % | SYSTOLIC BLOOD PRESSURE: 174 MMHG | HEART RATE: 63 BPM

## 2024-03-04 DIAGNOSIS — Z94.4 LIVER REPLACED BY TRANSPLANT (H): Primary | ICD-10-CM

## 2024-03-04 DIAGNOSIS — E11.42 TYPE 2 DIABETES MELLITUS WITH DIABETIC POLYNEUROPATHY, WITH LONG-TERM CURRENT USE OF INSULIN (H): ICD-10-CM

## 2024-03-04 DIAGNOSIS — N18.30 STAGE 3 CHRONIC KIDNEY DISEASE, UNSPECIFIED WHETHER STAGE 3A OR 3B CKD (H): ICD-10-CM

## 2024-03-04 DIAGNOSIS — Z79.4 TYPE 2 DIABETES MELLITUS WITH DIABETIC POLYNEUROPATHY, WITH LONG-TERM CURRENT USE OF INSULIN (H): ICD-10-CM

## 2024-03-04 PROCEDURE — 99213 OFFICE O/P EST LOW 20 MIN: CPT | Performed by: INTERNAL MEDICINE

## 2024-03-04 PROCEDURE — 99214 OFFICE O/P EST MOD 30 MIN: CPT | Performed by: INTERNAL MEDICINE

## 2024-03-04 ASSESSMENT — PAIN SCALES - GENERAL: PAINLEVEL: NO PAIN (0)

## 2024-03-04 NOTE — TELEPHONE ENCOUNTER
Called to discuss tac level. Confirm if patient is taking tac. Left a message for patient to return call.

## 2024-03-04 NOTE — LETTER
3/4/2024         RE: Camacho Bhagat  [address] [facility] [address]        Dear Colleague,    Thank you for referring your patient, Camacho Bhagat, to the [facility]. Please see a copy of my visit note below.    Solid Organ Transplant Hepatology Follow-Up Visit:     HISTORY OF PRESENT ILLNESS:   I had the pleasure of seeing Camacho Bhagat for followup in the Liver Clinic at the [facility] on March 4, 2024. Mr. Bhagat returns now 7 years status post liver transplantation.     He is doing well for the most part.  He denies abdominal pain, itching, skin rash, or fatigue.  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  He denies increased abdominal girth or lower extremity edema.       He denies fevers or chills, cough or shortness of breath.  He denies any nausea, vomiting.  He does have chronic diarrhea.  He does report almost what sounds like a dumping syndrome. He did have a partial colectomy around the time of transplant with resection of the ileocecal valve and I suspect this is some of the reason why he has these symptoms.       Otherwise, his appetite is good and his weight for the most part has remained stable.     Medications:   Current Outpatient Medications   Medication     alcohol swab prep pads     alpha-lipoic acid 600 MG capsule     amLODIPine (NORVASC) 10 MG tablet     augmented betamethasone dipropionate (DIPROLENE-AF) 0.05 % external ointment     cholecalciferol (VITAMIN D3) 1000 UNIT tablet     Continuous Blood Gluc Sensor (DEXCOM G6 SENSOR) MISC     Continuous Blood Gluc Transmit (DEXCOM G6 TRANSMITTER) MISC     cyclobenzaprine (FLEXERIL) 10 MG tablet     doxazosin (CARDURA) 8 MG tablet     empagliflozin (JARDIANCE) 10 MG TABS tablet     fluticasone (FLONASE) 50 MCG/ACT nasal spray     furosemide (LASIX) 40 MG tablet     gabapentin (NEURONTIN) 800 MG tablet     hydrOXYzine (ATARAX) 25 MG tablet     Insulin  Disposable Pump (OMNIPOD 5 G6 INTRO, GEN 5,) KIT     Insulin Disposable Pump (OMNIPOD 5 G6 POD, GEN 5,) MISC     Insulin Disposable Pump (OMNIPOD 5 G6 POD, GEN 5,) MISC     Insulin Lispro (HUMALOG KWIKPEN) 200 UNIT/ML soln     insulin pen needle (BD ENE U/F) 32G X 4 MM miscellaneous     ketorolac (ACULAR) 0.5 % ophthalmic solution     lidocaine (LIDODERM) 5 % patch     lidocaine (XYLOCAINE) 5 % external ointment     losartan (COZAAR) 25 MG tablet     methocarbamol (ROBAXIN) 500 MG tablet     metoprolol succinate ER (TOPROL XL) 200 MG 24 hr tablet     moxifloxacin (VIGAMOX) 0.5 % ophthalmic solution     mycophenolic acid (GENERIC EQUIVALENT) 360 MG EC tablet     oxyCODONE (ROXICODONE) 5 MG tablet     prednisoLONE acetate (PRED FORTE) 1 % ophthalmic suspension     predniSONE (DELTASONE) 2.5 MG tablet     sildenafil (REVATIO) 20 MG tablet     tacrolimus (GENERIC EQUIVALENT) 1 MG capsule     tretinoin (RETIN-A) 0.025 % external cream     triamcinolone (KENALOG) 0.1 % external ointment     ursodiol (ACTIGALL) 500 MG tablet     No current facility-administered medications for this visit.        Vitals:   BP (!) 174/83   Pulse 63   Wt 131.6 kg (290 lb 3.2 oz)   SpO2 98%   BMI 39.36 kg/m      Physical Exam:   In general he looks quite well. HEENT exam shows no scleral icterus or temporal muscle wasting. Chest is clear. Abdominal exam shows no increase in girth. No masses or tenderness to palpation are present. Liver is 10 cm in span without left lobe enlargement. No spleen tip is palpable. Extremity exam shows no edema. Skin exam shows no stigmata of chronic liver disease. Neurologic exam shows no asterixis.     Labs:   Lab Results   Component Value Date     03/01/2024    POTASSIUM 5.4 (H) 03/01/2024    CHLORIDE 107 03/01/2024    ANIONGAP 10 03/01/2024    CO2 20 (L) 03/01/2024    BUN 49.4 (H) 03/01/2024    CR 1.93 (H) 03/01/2024    GFRESTIMATED 39 (L) 03/01/2024    JACOB 8.9 03/01/2024      Lab Results   Component  Value Date    WBC 4.6 03/01/2024    HGB 10.4 (L) 03/01/2024    HCT 31.0 (L) 03/01/2024    MCV 92 03/01/2024    MCH 30.9 03/01/2024    MCHC 33.5 03/01/2024    RDW 13.2 03/01/2024    PLT 98 (L) 03/01/2024     Lab Results   Component Value Date    ALBUMIN 4.2 03/01/2024    ALKPHOS 144 03/01/2024    AST 24 03/01/2024    BILICONJ 0.0 06/28/2012     Lab Results   Component Value Date    INR 0.89 06/01/2018     Recent Labs   Lab Test 03/01/24  1712 01/09/24  1714 11/30/23  1648 09/25/23  1029 07/31/23  1704 04/19/23  1238 01/30/23  0750 09/27/22  0841 09/02/22  1143 07/19/22  1153   ALKPHOS 144 162* 164* 153* 173* 167* 159* 157* 202* 196*   ALT 23 21 23 24 27 18 27 29 33 32   AST 24 26 25 28 32 22 18 17 25 25      Imaging:   No images are attached to the encounter.     Assessment/Plan:   IMPRESSION:   My impression is Mr. Bhagat is doing well now 7 years status post liver transplantation.  His liver tests are quite good. He does have chronic kidney disease as well, and similarly, that remains stable with a GFR of 40.     He also has diabetes and his A1c is excellent.  He is up to date with regard to vaccines.  For other cancer screening, he will see the dermatologist.  He is up to date with regard to screening colonoscopy as well.  He does need to have a bone density study done as it has been > 3 years.        My plan will be to see him back in the clinic again in 6 months.     I did spend a total of 30 minutes (on the date of the encounter), including 20 minutes of face-to-face clinic time including counseling. The rest of the time was spent in documentation and review of records.    Thank you very much for allowing me to participate in the care of this patient.  If you have any questions regarding my recommendations, please do not hesitate to contact me.         Toñito Camejo MD      Professor of Medicine  [employer]      Executive Medical Director, Solid Organ Transplant Program  [facility]  [facility]      Again, thank you for allowing me to participate in the care of your patient.        Sincerely,        Toñito Camejo MD

## 2024-03-04 NOTE — PROGRESS NOTES
Solid Organ Transplant Hepatology Follow-Up Visit:     HISTORY OF PRESENT ILLNESS:   I had the pleasure of seeing Camacho Bhagat for followup in the Liver Clinic at the [facility] on March 4, 2024. Mr. Bhagat returns now 7 years status post liver transplantation.     He is doing well for the most part.  He denies abdominal pain, itching, skin rash, or fatigue.  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  He denies increased abdominal girth or lower extremity edema.       He denies fevers or chills, cough or shortness of breath.  He denies any nausea, vomiting.  He does have chronic diarrhea.  He does report almost what sounds like a dumping syndrome. He did have a partial colectomy around the time of transplant with resection of the ileocecal valve and I suspect this is some of the reason why he has these symptoms.       Otherwise, his appetite is good and his weight for the most part has remained stable.     Medications:   Current Outpatient Medications   Medication    alcohol swab prep pads    alpha-lipoic acid 600 MG capsule    amLODIPine (NORVASC) 10 MG tablet    augmented betamethasone dipropionate (DIPROLENE-AF) 0.05 % external ointment    cholecalciferol (VITAMIN D3) 1000 UNIT tablet    Continuous Blood Gluc Sensor (DEXCOM G6 SENSOR) MISC    Continuous Blood Gluc Transmit (DEXCOM G6 TRANSMITTER) MISC    cyclobenzaprine (FLEXERIL) 10 MG tablet    doxazosin (CARDURA) 8 MG tablet    empagliflozin (JARDIANCE) 10 MG TABS tablet    fluticasone (FLONASE) 50 MCG/ACT nasal spray    furosemide (LASIX) 40 MG tablet    gabapentin (NEURONTIN) 800 MG tablet    hydrOXYzine (ATARAX) 25 MG tablet    Insulin Disposable Pump (OMNIPOD 5 G6 INTRO, GEN 5,) KIT    Insulin Disposable Pump (OMNIPOD 5 G6 POD, GEN 5,) MISC    Insulin Disposable Pump (OMNIPOD 5 G6 POD, GEN 5,) MISC    Insulin Lispro (HUMALOG KWIKPEN) 200 UNIT/ML soln    insulin pen needle (BD ENE U/F) 32G X 4 MM miscellaneous     ketorolac (ACULAR) 0.5 % ophthalmic solution    lidocaine (LIDODERM) 5 % patch    lidocaine (XYLOCAINE) 5 % external ointment    losartan (COZAAR) 25 MG tablet    methocarbamol (ROBAXIN) 500 MG tablet    metoprolol succinate ER (TOPROL XL) 200 MG 24 hr tablet    moxifloxacin (VIGAMOX) 0.5 % ophthalmic solution    mycophenolic acid (GENERIC EQUIVALENT) 360 MG EC tablet    oxyCODONE (ROXICODONE) 5 MG tablet    prednisoLONE acetate (PRED FORTE) 1 % ophthalmic suspension    predniSONE (DELTASONE) 2.5 MG tablet    sildenafil (REVATIO) 20 MG tablet    tacrolimus (GENERIC EQUIVALENT) 1 MG capsule    tretinoin (RETIN-A) 0.025 % external cream    triamcinolone (KENALOG) 0.1 % external ointment    ursodiol (ACTIGALL) 500 MG tablet     No current facility-administered medications for this visit.        Vitals:   BP (!) 174/83   Pulse 63   Wt 131.6 kg (290 lb 3.2 oz)   SpO2 98%   BMI 39.36 kg/m      Physical Exam:   In general he looks quite well. HEENT exam shows no scleral icterus or temporal muscle wasting. Chest is clear. Abdominal exam shows no increase in girth. No masses or tenderness to palpation are present. Liver is 10 cm in span without left lobe enlargement. No spleen tip is palpable. Extremity exam shows no edema. Skin exam shows no stigmata of chronic liver disease. Neurologic exam shows no asterixis.     Labs:   Lab Results   Component Value Date     03/01/2024    POTASSIUM 5.4 (H) 03/01/2024    CHLORIDE 107 03/01/2024    ANIONGAP 10 03/01/2024    CO2 20 (L) 03/01/2024    BUN 49.4 (H) 03/01/2024    CR 1.93 (H) 03/01/2024    GFRESTIMATED 39 (L) 03/01/2024    JACOB 8.9 03/01/2024      Lab Results   Component Value Date    WBC 4.6 03/01/2024    HGB 10.4 (L) 03/01/2024    HCT 31.0 (L) 03/01/2024    MCV 92 03/01/2024    MCH 30.9 03/01/2024    MCHC 33.5 03/01/2024    RDW 13.2 03/01/2024    PLT 98 (L) 03/01/2024     Lab Results   Component Value Date    ALBUMIN 4.2 03/01/2024    ALKPHOS 144 03/01/2024    AST 24  03/01/2024    BILICONJ 0.0 06/28/2012     Lab Results   Component Value Date    INR 0.89 06/01/2018     Recent Labs   Lab Test 03/01/24  1712 01/09/24  1714 11/30/23  1648 09/25/23  1029 07/31/23  1704 04/19/23  1238 01/30/23  0750 09/27/22  0841 09/02/22  1143 07/19/22  1153   ALKPHOS 144 162* 164* 153* 173* 167* 159* 157* 202* 196*   ALT 23 21 23 24 27 18 27 29 33 32   AST 24 26 25 28 32 22 18 17 25 25      Imaging:   No images are attached to the encounter.     Assessment/Plan:   IMPRESSION:   My impression is Mr. Bhagat is doing well now 7 years status post liver transplantation.  His liver tests are quite good. He does have chronic kidney disease as well, and similarly, that remains stable with a GFR of 40.     He also has diabetes and his A1c is excellent.  He is up to date with regard to vaccines.  For other cancer screening, he will see the dermatologist.  He is up to date with regard to screening colonoscopy as well.  He does need to have a bone density study done as it has been > 3 years.        My plan will be to see him back in the clinic again in 6 months.     I did spend a total of 30 minutes (on the date of the encounter), including 20 minutes of face-to-face clinic time including counseling. The rest of the time was spent in documentation and review of records.    Thank you very much for allowing me to participate in the care of this patient.  If you have any questions regarding my recommendations, please do not hesitate to contact me.         Toñito Camejo MD      Professor of Medicine  [employer]      Executive Medical Director, Solid Organ Transplant Program  [facility]

## 2024-03-07 ENCOUNTER — TELEPHONE (OUTPATIENT)
Dept: TRANSPLANT | Facility: CLINIC | Age: 59
End: 2024-03-07
Payer: COMMERCIAL

## 2024-03-07 NOTE — TELEPHONE ENCOUNTER
Called patient to discuss labs (repeat Tac level) and complete UNOS update.    Patient did not answer, left voicemail requesting repeat Tac level and call back to complete update.

## 2024-03-08 ENCOUNTER — LAB (OUTPATIENT)
Dept: LAB | Facility: CLINIC | Age: 59
End: 2024-03-08
Payer: COMMERCIAL

## 2024-03-08 DIAGNOSIS — Z94.4 LIVER REPLACED BY TRANSPLANT (H): ICD-10-CM

## 2024-03-08 LAB
TACROLIMUS BLD-MCNC: 3.9 UG/L (ref 5–15)
TME LAST DOSE: ABNORMAL H
TME LAST DOSE: ABNORMAL H

## 2024-03-08 PROCEDURE — 99000 SPECIMEN HANDLING OFFICE-LAB: CPT | Performed by: PATHOLOGY

## 2024-03-08 PROCEDURE — 36415 COLL VENOUS BLD VENIPUNCTURE: CPT | Performed by: PATHOLOGY

## 2024-03-08 PROCEDURE — 80197 ASSAY OF TACROLIMUS: CPT | Performed by: INTERNAL MEDICINE

## 2024-03-25 NOTE — TELEPHONE ENCOUNTER
Called Camacho to adjust Tac dose based on level of 1.8-    Unable to reach patient, he did call back and leave a message that he was back to work today and that he had just restarted his Tac yesterday at his previous dose of 3 mg in the morning and 4 mg in the evening, but he would not be able to take phone calls today.    Sending a [employer] message to confirm dose and ask him to repeat his Tac level in 1 week, will plan to follow up after recheck.      
Patient Call: General    Reason for call: patient called stated he started Tacrolimus last night 3 mg in the Pm and 4 mg in the AM. Caller will be seeing patient's through out the day and stated it would be heard for him to take calls a VM can be left and he will return the call when he can.    Call back needed? Yes    Return Call Needed- If needed      
1 person assist

## 2024-04-22 ENCOUNTER — ANCILLARY PROCEDURE (OUTPATIENT)
Dept: BONE DENSITY | Facility: CLINIC | Age: 59
End: 2024-04-22
Attending: INTERNAL MEDICINE
Payer: COMMERCIAL

## 2024-04-22 DIAGNOSIS — Z94.4 LIVER REPLACED BY TRANSPLANT (H): ICD-10-CM

## 2024-04-22 PROCEDURE — 77080 DXA BONE DENSITY AXIAL: CPT | Performed by: INTERNAL MEDICINE

## 2024-05-22 ENCOUNTER — TELEPHONE (OUTPATIENT)
Dept: ENDOCRINOLOGY | Facility: CLINIC | Age: 59
End: 2024-05-22
Payer: COMMERCIAL

## 2024-05-22 NOTE — TELEPHONE ENCOUNTER
Patient confirmed scheduled appointment:  Date: 9/30   Time: 11   Visit type: return diabetes   Provider: Brett   Location: [facility]   Testing/imaging:   Additional notes: 9/16 annie r/s   Carlyn Mendes on 5/22/2024 at 4:19 PM

## 2024-05-23 ENCOUNTER — LAB (OUTPATIENT)
Dept: LAB | Facility: CLINIC | Age: 59
End: 2024-05-23
Payer: COMMERCIAL

## 2024-05-23 DIAGNOSIS — N18.31 STAGE 3A CHRONIC KIDNEY DISEASE (H): ICD-10-CM

## 2024-05-23 DIAGNOSIS — Z94.4 LIVER REPLACED BY TRANSPLANT (H): ICD-10-CM

## 2024-05-23 LAB
ALBUMIN SERPL BCG-MCNC: 4 G/DL (ref 3.5–5.2)
ALP SERPL-CCNC: 155 U/L (ref 40–150)
ALT SERPL W P-5'-P-CCNC: 22 U/L (ref 0–70)
ANION GAP SERPL CALCULATED.3IONS-SCNC: 10 MMOL/L (ref 7–15)
AST SERPL W P-5'-P-CCNC: 21 U/L (ref 0–45)
BILIRUB DIRECT SERPL-MCNC: <0.2 MG/DL (ref 0–0.3)
BILIRUB SERPL-MCNC: 0.4 MG/DL
BUN SERPL-MCNC: 50.5 MG/DL (ref 8–23)
CALCIUM SERPL-MCNC: 8.9 MG/DL (ref 8.6–10)
CHLORIDE SERPL-SCNC: 112 MMOL/L (ref 98–107)
CREAT SERPL-MCNC: 2.02 MG/DL (ref 0.67–1.17)
DEPRECATED HCO3 PLAS-SCNC: 19 MMOL/L (ref 22–29)
EGFRCR SERPLBLD CKD-EPI 2021: 37 ML/MIN/1.73M2
ERYTHROCYTE [DISTWIDTH] IN BLOOD BY AUTOMATED COUNT: 13.3 % (ref 10–15)
GLUCOSE SERPL-MCNC: 122 MG/DL (ref 70–99)
HCT VFR BLD AUTO: 32.6 % (ref 40–53)
HGB BLD-MCNC: 10.7 G/DL (ref 13.3–17.7)
MCH RBC QN AUTO: 29.9 PG (ref 26.5–33)
MCHC RBC AUTO-ENTMCNC: 32.8 G/DL (ref 31.5–36.5)
MCV RBC AUTO: 91 FL (ref 78–100)
PHOSPHATE SERPL-MCNC: 3.4 MG/DL (ref 2.5–4.5)
PLATELET # BLD AUTO: 102 10E3/UL (ref 150–450)
POTASSIUM SERPL-SCNC: 5 MMOL/L (ref 3.4–5.3)
PROT SERPL-MCNC: 6.4 G/DL (ref 6.4–8.3)
RBC # BLD AUTO: 3.58 10E6/UL (ref 4.4–5.9)
SODIUM SERPL-SCNC: 141 MMOL/L (ref 135–145)
TACROLIMUS BLD-MCNC: 2.7 UG/L (ref 5–15)
TME LAST DOSE: ABNORMAL H
TME LAST DOSE: ABNORMAL H
WBC # BLD AUTO: 5.3 10E3/UL (ref 4–11)

## 2024-05-23 PROCEDURE — 80197 ASSAY OF TACROLIMUS: CPT | Performed by: INTERNAL MEDICINE

## 2024-05-23 PROCEDURE — 85027 COMPLETE CBC AUTOMATED: CPT | Performed by: PATHOLOGY

## 2024-05-23 PROCEDURE — 82248 BILIRUBIN DIRECT: CPT | Performed by: PATHOLOGY

## 2024-05-23 PROCEDURE — 80053 COMPREHEN METABOLIC PANEL: CPT | Performed by: PATHOLOGY

## 2024-05-23 PROCEDURE — 99000 SPECIMEN HANDLING OFFICE-LAB: CPT | Performed by: PATHOLOGY

## 2024-05-23 PROCEDURE — 36415 COLL VENOUS BLD VENIPUNCTURE: CPT | Performed by: PATHOLOGY

## 2024-05-23 PROCEDURE — 84100 ASSAY OF PHOSPHORUS: CPT | Performed by: PATHOLOGY

## 2024-05-28 NOTE — PLAN OF CARE
Left voice message for patient to return call to Dr. Rickey GARCIAS.     Problem: Goal Outcome Summary  Goal: Goal Outcome Summary  Outcome: No Change  D/I/A:  Neuro: Alert and oriented, follows commands.  Does resist turns r/t pain but does not refuse. Continues to require bilateral wrist restraints; reaches for ETT without.  CV:  SR-ST, no ectopy noted.  SBP goal <180, patient has cuff on leg and becomes very agitated when taking blood pressure.  Tmax 102.6, down with tylenol and fan.  Mild generalized edema.  Pulses all palpable except right radial; good ulnar pulse.    Pulm:  CMV 30/5/22/500.  Moderate amount secretions via ETT.  Coarse over diminished lungs.  GI:  TF at goal via NJ, NG to LIS; 350 out this shift.  Rectal tube in place but leas at times.  :  Vazquez with good uop.    Endo:  Insulin drip titrated between 1-4, currently at 4.  ID: Contact iso for VRE.  Lines:  2 piv's, ETT, vazquez, rectal tube.  Gtts: Insulin at 4, TKO.  Skin:  Several toes and fingers black and red.  Abdominal incision CDI.      P: Continue to monitor patient closely; update team with any changes or concerns.     Please see flow sheets for further information.       Complex Repair And Flap Additional Text (Will Appearing After The Standard Complex Repair Text): The complex repair was not sufficient to completely close the primary defect. The remaining additional defect was repaired with the flap mentioned below.

## 2024-06-01 ENCOUNTER — HEALTH MAINTENANCE LETTER (OUTPATIENT)
Age: 59
End: 2024-06-01

## 2024-06-07 NOTE — DISCHARGE SUMMARY
[facility], [place]    Discharge Summary  Transplant Surgery    Date of Admission:  2017  Date of Discharge:  3/10/2017  Discharging Provider: Ada Driver PA-C/Drew Dalal  Date of Service (when I saw the patient): 03/10/17    Discharge Diagnoses   Active Problems:    Uncontrolled type 2 diabetes mellitus with hyperglycemia, with long-term current use of insulin (H)    Hepatic encephalopathy (H)    Liver transplant recipient (H)    Acute kidney injury (H)      Procedure/Surgery Information   Procedure: Procedure(s):  Liver Transplant    - Wound Class: N/A   Surgeon(s): Surgeon(s) and Role:     * Jovan Tran MD - Primary     * Adonay Cross MD - Assisting     * Melissa Rubalcava MD - Assisting       Non-operative procedures None performed     History of Present Illness   Camacho Bhagat is a 51 year old male with cirrhosis of the liver due to [place]ED[place] complicated by HCC and admitted to the hospital on 17 for hepatic encephalopathy. He received a  donor liver transplant on 3/4/17.     Hospital Course   Camacho Bhagat was admitted on 2017.  The following problems were addressed during his hospitalization:    Liver transplant: Good function. POD 1 US unable to visualize HA. CTA performed, main, left and right central HA patent, portal vein patent. LFTs trending down to normal.  mg daily for HAT prophylaxis.     Pain: Incisional pain and new MSK back pain. Oxycodone PRN and flexeril PRN. Abdominal binder. Heat, OOB to chair and PT/OT for back.    Immunosuppression management: Simulect and steroids per protocol. Started on cellcept 1 gm BID and tacrolimus for immunosuppression maintenance. Tacrolimus 1 mg BID started POD 1. 3/10 Tacrolimus level 3.7. Discharged on tacrolimus 4 mg BID.    Hematology: Anemia. Hemoglobin stable, 7-8. Thrombocytopenia, improving. Started  mg for HAT ppx.     Cardiorespiratory: Extubated POD 1. Stable  on RA. OOB to chair. Aggressive pulmonary toilet.    GI/Nutrition: Regular diet. Good intake. Bowel regimen: Senna BID    DM type 2: PTA Lantus 65 units and Novolog SSI. Hyperglycemia secondary to steroids. On insulin gtt then transitioned to Lantus/novolog carb coverage and novolog SSI QID. Endocrine consulted for glycemic management this admission.     Acute kidney injury: Acute kidney injury. PTA Cr 0.8. Patient admitted with dehydration and HRS, possible bilirubin cast nephropathy per Nephrology. Creatinine 1.4 on admission. Post transplant, acute worsening of EJ due to liver transplant and IV contrast (CTA imaging of HA), with creatinine increase up to 3.3. Creatinine trending down now.  Creatinine on day of discharge is 1.2 down from 1.6 yesterday.     : Urinary retention pre transplant per notes. Removed vazquez POD 3. No retention post op.     Infectious disease: Periop antibiotics completed. Afebrile.     Ada Driver PA-C    Discharge Disposition   Discharged to home   Condition at discharge: Stable        Primary Care Physician   Shay Kirkpatrick    General Appearance: NAD.   Skin: normal, jaundice  Heart: RRR  Lungs: BCTA. NLB on RA  Abdomen: soft, appropriately ttp. he is not tender over the liver graft. The wound is intact, dry  : vazquez removed.   Extremities: edema: present bilaterally. 1+  Neurologic: awake. Tremor absent.      Consultations This Hospital Stay   VASCULAR ACCESS CARE ADULT IP CONSULT  GI HEPATOLOGY ADULT IP CONSULT  NUTRITION SERVICES ADULT IP CONSULT  CARDIOLOGY IP CONSULT  VASCULAR ACCESS CARE ADULT IP CONSULT  PHYSICAL THERAPY ADULT IP CONSULT  OCCUPATIONAL THERAPY ADULT IP CONSULT  VASCULAR ACCESS CARE ADULT IP CONSULT  VASCULAR ACCESS CARE ADULT IP CONSULT  ENDOCRINOLOGY IP CONSULT  VASCULAR ACCESS CARE ADULT IP CONSULT  VASCULAR ACCESS CARE ADULT IP CONSULT  VASCULAR ACCESS CARE ADULT IP CONSULT  NEPHROLOGY GENERAL ADULT IP CONSULT  VASCULAR ACCESS CARE ADULT IP  CONSULT  VASCULAR ACCESS CARE ADULT IP CONSULT  OCCUPATIONAL THERAPY ADULT IP CONSULT  PHYSICAL THERAPY ADULT IP CONSULT  SOCIAL WORK IP CONSULT  NUTRITION SERVICES ADULT IP CONSULT  PHYSICAL THERAPY ADULT IP CONSULT  OCCUPATIONAL THERAPY ADULT IP CONSULT  NUTRITION SERVICES ADULT IP CONSULT  OCCUPATIONAL THERAPY ADULT IP CONSULT  PHYSICAL THERAPY ADULT IP CONSULT  PHARMACY IP CONSULT  PHARMACY IP CONSULT  SWALLOW EVAL SPEECH PATH AT BEDSIDE IP CONSULT    Time Spent on this Encounter   I have spent greater than 30 minutes on this discharge.    Discharge Orders   Discharge Medications   Current Discharge Medication List      START taking these medications    Details   oxyCODONE (ROXICODONE) 5 MG IR tablet Take 1-2 tablets (5-10 mg) by mouth every 4 hours as needed for moderate to severe pain  Qty: 40 tablet, Refills: 0    Associated Diagnoses: Liver transplant recipient (H)      aspirin 325 MG tablet 1 tablet (325 mg) by Oral or Feeding Tube route daily  Qty: 180 tablet, Refills: 4    Associated Diagnoses: Liver transplant recipient (H)      !! insulin aspart (NOVOLOG PEN) 100 UNIT/ML injection DOSE:  1 units per 8 grams of carbohydrate. With meals and snacks. Only chart total amount of units given.  Do not give if pre-prandial glucose is less than 60 mg/dL.  Qty: 3 mL, Refills: 3    Associated Diagnoses: Liver transplant recipient (H)      !! insulin aspart (NOVOLOG PEN) 100 UNIT/ML injection Inject 1-10 Units Subcutaneous 3 times daily (before meals) Pre-meal blood glucose (BG)   - 164 give 1 unit.   - 189 give 2 units.   - 214 give 3 units.   - 239 give 4 units.   - 264 give 5 units.   - 289 give 6 units.   - 314 give 7 units.   - 339 give 8 units.   - 364 give 9 units.  BG over 364 give 10 units.  Qty: 3 mL, Refills: 3    Associated Diagnoses: Liver transplant recipient (H)      !! insulin aspart (NOVOLOG PEN) 100 UNIT/ML injection Inject 1-7 Units  Subcutaneous At Bedtime Bedtime Blood Glucose (BG)   - 224 give 1 units.    - 249 give 2 units.   - 274 give 3 units.   - 299 give 3 units.   - 324 give 5 units.   - 349 give 6 units.  BG Over 349 give 7 units.  Qty: 3 mL, Refills: 3    Associated Diagnoses: Liver transplant recipient (H)      valGANciclovir (VALCYTE) 450 MG tablet Take 1 tablet (450 mg) by mouth daily  Qty: 60 tablet, Refills: 5    Comments: X 6 months (CMV IgG D+R-)  Associated Diagnoses: Liver transplant recipient (H)      mycophenolate (CELLCEPT - GENERIC EQUIVALENT) 250 MG capsule Take 4 capsules (1,000 mg) by mouth 2 times daily  Qty: 240 capsule, Refills: 11    Associated Diagnoses: Liver transplant recipient (H)      tacrolimus (PROGRAF - GENERIC EQUIVALENT) 1 MG capsule Take 3 capsules (3 mg) by mouth 2 times daily  Qty: 180 capsule, Refills: 11    Associated Diagnoses: Liver transplant recipient (H)      senna-docusate (SENOKOT-S;PERICOLACE) 8.6-50 MG per tablet Take 2 tablets by mouth 2 times daily  Qty: 100 tablet, Refills: 1    Associated Diagnoses: Liver transplant recipient (H)      sulfamethoxazole-trimethoprim (BACTRIM/SEPTRA) 400-80 MG per tablet Take 1 tablet by mouth daily  Qty: 30 tablet, Refills: 5    Associated Diagnoses: Liver transplant recipient (H)      clotrimazole (MYCELEX) 10 MG LOZG lozenge Place 1 lozenge (1 Beatrice) inside cheek 3 times daily  Qty: 70 each, Refills: 0    Comments: X 12 weeks  Associated Diagnoses: Liver transplant recipient (H)      multivitamin, therapeutic with minerals (THERA-VIT-M) TABS tablet Take 1 tablet by mouth daily  Qty: 30 each, Refills: 11    Associated Diagnoses: Liver transplant recipient (H)       !! - Potential duplicate medications found. Please discuss with provider.      CONTINUE these medications which have CHANGED    Details   insulin glargine (LANTUS) 100 UNIT/ML injection Inject 45 Units Subcutaneous every 24 hours  Qty: 3 mL, Refills: 3     Associated Diagnoses: Liver transplant recipient (H)         CONTINUE these medications which have NOT CHANGED    Details   ondansetron (ZOFRAN-ODT) 4 MG ODT tab Take 1 tablet (4 mg) by mouth every 8 hours as needed for nausea  Qty: 30 tablet, Refills: 0    Associated Diagnoses: Cirrhosis of liver with ascites, unspecified hepatic cirrhosis type (H); Nausea      glucagon 1 MG SOLR injection Inject 1 mg Subcutaneous every 15 minutes as needed for low blood sugar (May repeat x 1 only)  Qty: 1 each, Refills: 0    Associated Diagnoses: Hypoglycemia      lactobacillus rhamnosus, GG, (CULTURELL) capsule Take 1 capsule by mouth 2 times daily  Qty: 60 capsule, Refills: 0    Associated Diagnoses: Imbalanced nutrition      cyclobenzaprine (FLEXERIL) 10 MG tablet Take 1 tablet (10 mg) by mouth 3 times daily as needed for muscle spasms  Qty: 30 tablet, Refills: 0    Associated Diagnoses: Bladder spasms      gabapentin (NEURONTIN) 300 MG capsule Take 2 capsules (600 mg) by mouth At Bedtime  Qty: 60 capsule, Refills: 0    Associated Diagnoses: Type 2 diabetes mellitus with diabetic polyneuropathy, unspecified long term insulin use status (H)      omeprazole (PRILOSEC) 20 MG capsule Take 1 capsule (20 mg) by mouth 2 times daily (before meals)  Qty: 60 capsule, Refills: 0    Associated Diagnoses: Gastroesophageal reflux disease without esophagitis         STOP taking these medications       LISINOPRIL PO Comments:   Reason for Stopping:         insulin lispro (HUMALOG) 100 UNIT/ML injection Comments:   Reason for Stopping:         furosemide (LASIX) 40 MG tablet Comments:   Reason for Stopping:         spironolactone (ALDACTONE) 50 MG tablet Comments:   Reason for Stopping:         rifaximin (XIFAXAN) 550 MG TABS tablet Comments:   Reason for Stopping:                 Reason for your hospital stay    donor liver transplant, biliary stent placed  High output drain, due to large volume ascites  Acute renal insufficiency, HRS,  IV contrast (CTA)  DM type 2. Hyperglycemia secondary to steroids     [facility] Follow-up and recommended labs and tests   You will be seen in the [facility] (ph. [phone]) on Huy 3/12 and Monday 3/13.   Your labs will be drawn at 9:00am just prior to your appointment. DO NOT take tacrolimus (Prograf) until after your labs are drawn. Remember to bring all of your medications with you to this appointment.      LABS:  Transplant labs (CBC, BMP, hepatic panel, mag, phos, and  tacrolimus levels) to be drawn every  Monday and Thursday for 4 weeks.   Transplant Labs to be drawn on 3/12 Huy.     FOLLOW UP APPOINTMENTS:  Remember to always bring an updated medication list to all appointments.     Follow up with your transplant surgeon, Chelsea, in 2 weeks. Appt to be scheduled on 3/20/2017  Follow up with transplant hepatology as directed by transplant surgeon.    Follow up with oncology in 1-2 months  Follow up with primary care provider in 2-4 weeks. (Pt to schedule)  Your staples will be removed 3 weeks after your operation.  You have a biliary stent in place.  Your coordinator will follow up in 6-8 weeks.      WHEN TO CONTACT YOUR  COORDINATOR:  Transplant Coordinator [phone]  Notify your coordinator if you have abdominal pain, increased redness or drainage from your incision, or fever greater than 100.5F.  Notify your coordinator immediately if you are ever unable to take your immunosuppressive medications for any reason.  If it is outside of office hours, please call the hospital switchboard at [phone] and ask to have the liver transplant surgery fellow paged for urgent medical questions, or present to the emergency department.    OTHER DISCHARGE INSTRUCTIONS:  TABITHA plan: Please monitor color and amount of output. Drain will remain in place until output is < 500 cc x 3 days.   Monitor blood glucose levels 4 times a day  Walk at least four times a day, lift no greater than 10  No pounds for 6-8 weeks from the time of surgery.  No driving while taking narcotics or 3 weeks after surgery.    Diet recommendations post-transplant: Heart healthy dietary habits long term (low saturated/trans fat, low sodium). High protein diet x 8 weeks. Practice food safety precautions.           Data   Most Recent 3 CBC's:  Recent Labs   Lab Test  03/10/17   0652  03/09/17   0652  03/08/17   0649   WBC  7.9  4.8  5.3   HGB  8.8*  8.0*  8.4*   MCV  92  92  91   PLT  60*  36*  35*      Most Recent 3 BMP's:  Recent Labs   Lab Test  03/10/17   0652  03/09/17   0652  03/08/17   0649   NA  132*  131*  133   POTASSIUM  4.4  4.2  3.9   CHLORIDE  99  98  99   CO2  25  25  24   BUN  65*  84*  106*   CR  1.16  1.55*  2.19*   ANIONGAP  8  9  10   JACOB  7.9*  7.8*  8.0*   GLC  155*  303*  116*     Most Recent 2 LFT's:  Recent Labs   Lab Test  03/10/17   0652  03/09/17   0652   AST  51*  53*   ALT  105*  113*   ALKPHOS  97  88   BILITOTAL  3.6*  4.0*     Most Recent INR's and Anticoagulation Dosing History:  Anticoagulation Dose History     Recent Dosing and Labs Latest Ref Rng & Units 3/4/2017 3/4/2017 3/5/2017 3/5/2017 3/6/2017 3/7/2017 3/8/2017    INR 0.86 - 1.14 1.81(H) 1.94(H) 1.62(H) 1.45(H) 1.37(H) 1.24(H) 1.16(H)        Most Recent 3 Troponin's:No lab results found.  Most Recent Cholesterol Panel:  Recent Labs   Lab Test  02/08/16   1144   CHOL  141   LDL  61   HDL  60   TRIG  99     Most Recent 6 Bacteria Isolates From Any Culture (See EPIC Reports for Culture Details):  Recent Labs   Lab Test  03/04/17   1500  03/03/17   2353  03/03/17   1405  02/27/17   1430  02/23/17   1330  02/21/17   1918   CULT  No growth  Culture negative after 4 days  Culture negative monitoring continues  Moderate growth Enterococcus faecium (VRE)*  No growth  No growth  No growth  No growth     Most Recent TSH, T4 and A1c Labs:  Recent Labs   Lab Test  02/16/17   0725   02/08/16   1144   04/11/11   1209   TSH   --    --   3.35   --   2.77    T4   --    --    --    --   1.23   A1C  9.4*   < >   --    < >   --     < > = values in this interval not displayed.       Lab Results   Component Value Date    LIPASE 216 03/05/2017    LIPASE 326 02/21/2017    LIPASE 161 12/09/2016    LIPASE 138 09/30/2010    LIPASE 37 06/26/2007     Lab Results   Component Value Date    AMYLASE 53 03/05/2017    AMYLASE 70 03/03/2017    AMYLASE 82 09/30/2010    AMYLASE 50 06/26/2007       I have reviewed history, examined patient and discussed plan with the fellow/resident/PILLO.  I concur with the findings in this note.

## 2024-06-17 ENCOUNTER — MYC MEDICAL ADVICE (OUTPATIENT)
Dept: INTERNAL MEDICINE | Facility: CLINIC | Age: 59
End: 2024-06-17
Payer: COMMERCIAL

## 2024-06-17 DIAGNOSIS — I10 ESSENTIAL HYPERTENSION: Primary | ICD-10-CM

## 2024-06-17 DIAGNOSIS — E66.01 MORBID OBESITY (H): ICD-10-CM

## 2024-06-18 DIAGNOSIS — Z79.4 TYPE 2 DIABETES MELLITUS WITH BOTH EYES AFFECTED BY MILD NONPROLIFERATIVE RETINOPATHY AND MACULAR EDEMA, WITH LONG-TERM CURRENT USE OF INSULIN (H): Primary | ICD-10-CM

## 2024-06-18 DIAGNOSIS — N18.31 STAGE 3A CHRONIC KIDNEY DISEASE (H): ICD-10-CM

## 2024-06-18 DIAGNOSIS — I10 BENIGN ESSENTIAL HYPERTENSION: ICD-10-CM

## 2024-06-18 DIAGNOSIS — E11.3213 TYPE 2 DIABETES MELLITUS WITH BOTH EYES AFFECTED BY MILD NONPROLIFERATIVE RETINOPATHY AND MACULAR EDEMA, WITH LONG-TERM CURRENT USE OF INSULIN (H): Primary | ICD-10-CM

## 2024-06-18 DIAGNOSIS — R80.1 PERSISTENT PROTEINURIA: ICD-10-CM

## 2024-06-18 DIAGNOSIS — Z94.4 LIVER TRANSPLANT RECIPIENT (H): ICD-10-CM

## 2024-06-18 RX ORDER — LOSARTAN POTASSIUM 25 MG/1
25 TABLET ORAL DAILY
Qty: 90 TABLET | Refills: 3 | Status: SHIPPED | OUTPATIENT
Start: 2024-06-18

## 2024-06-18 RX ORDER — TACROLIMUS 1 MG/1
CAPSULE ORAL
Qty: 630 CAPSULE | Refills: 3 | Status: SHIPPED | OUTPATIENT
Start: 2024-06-18

## 2024-06-19 ENCOUNTER — OFFICE VISIT (OUTPATIENT)
Dept: OPHTHALMOLOGY | Facility: CLINIC | Age: 59
End: 2024-06-19
Attending: OPHTHALMOLOGY
Payer: COMMERCIAL

## 2024-06-19 DIAGNOSIS — E11.3213 TYPE 2 DIABETES MELLITUS WITH BOTH EYES AFFECTED BY MILD NONPROLIFERATIVE RETINOPATHY AND MACULAR EDEMA, WITH LONG-TERM CURRENT USE OF INSULIN (H): ICD-10-CM

## 2024-06-19 DIAGNOSIS — Z79.4 TYPE 2 DIABETES MELLITUS WITH BOTH EYES AFFECTED BY MILD NONPROLIFERATIVE RETINOPATHY AND MACULAR EDEMA, WITH LONG-TERM CURRENT USE OF INSULIN (H): ICD-10-CM

## 2024-06-19 PROCEDURE — 92014 COMPRE OPH EXAM EST PT 1/>: CPT | Performed by: OPHTHALMOLOGY

## 2024-06-19 PROCEDURE — 99213 OFFICE O/P EST LOW 20 MIN: CPT | Performed by: OPHTHALMOLOGY

## 2024-06-19 PROCEDURE — 92134 CPTRZ OPH DX IMG PST SGM RTA: CPT | Performed by: OPHTHALMOLOGY

## 2024-06-19 ASSESSMENT — CONF VISUAL FIELD
OD_INFERIOR_TEMPORAL_RESTRICTION: 0
OS_NORMAL: 1
OD_SUPERIOR_NASAL_RESTRICTION: 0
OS_INFERIOR_TEMPORAL_RESTRICTION: 0
OS_SUPERIOR_NASAL_RESTRICTION: 0
OS_INFERIOR_NASAL_RESTRICTION: 0
METHOD: COUNTING FINGERS
OD_NORMAL: 1
OD_SUPERIOR_TEMPORAL_RESTRICTION: 0
OS_SUPERIOR_TEMPORAL_RESTRICTION: 0
OD_INFERIOR_NASAL_RESTRICTION: 0

## 2024-06-19 ASSESSMENT — VISUAL ACUITY
OD_SC+: -2
METHOD: SNELLEN - LINEAR
CORRECTION_TYPE: GLASSES
OD_SC: 20/20
OS_CC: 20/40

## 2024-06-19 ASSESSMENT — SLIT LAMP EXAM - LIDS
COMMENTS: NORMAL
COMMENTS: NORMAL

## 2024-06-19 ASSESSMENT — EXTERNAL EXAM - RIGHT EYE: OD_EXAM: NORMAL

## 2024-06-19 ASSESSMENT — CUP TO DISC RATIO
OS_RATIO: 0.1
OD_RATIO: 0.1

## 2024-06-19 ASSESSMENT — REFRACTION_WEARINGRX
OS_SPHERE: +1.00
OS_AXIS: 165
OD_CYLINDER: +1.00
OS_ADD: +1.60
SPECS_TYPE: PAL
OS_CYLINDER: +0.50
OD_SPHERE: -0.50
OD_AXIS: 016
OD_ADD: 1.61

## 2024-06-19 ASSESSMENT — TONOMETRY
OD_IOP_MMHG: 10
IOP_METHOD: ICARE
OS_IOP_MMHG: 11

## 2024-06-19 NOTE — NURSING NOTE
Chief Complaints and History of Present Illnesses   Patient presents with    Diabetic Retinopathy Follow Up      Type 2 diabetes mellitus with both eyes affected by mild nonproliferative retinopathy and macular edema, with long-term current use of insulin     Chief Complaint(s) and History of Present Illness(es)       Diabetic Retinopathy Follow Up              Laterality: both eyes    Associated symptoms: Negative for glare, haloes, dryness, eye pain, flashes and floaters    Treatments tried: no treatments    Pain scale: 0/10    Comments:  Type 2 diabetes mellitus with both eyes affected by mild nonproliferative retinopathy and macular edema, with long-term current use of insulin              Comments    Pt states vision is the same.  No pain.  No flashes/floaters.    No ocular meds.    DM 2  BS were 98 this am.  Lab Results       Component                Value               Date                       A1C                      5.8                 03/01/2024                 A1C                      6.2                 11/30/2023                 A1C                      5.7                 07/31/2023                 A1C                      6.0                 09/27/2022                 A1C                      5.2                 03/03/2022                 A1C                      6.3                 03/31/2021                 A1C                      6.7                 02/12/2021                 A1C                      6.4                 11/24/2020                 A1C                      6.2                 10/06/2020                 A1C                      5.5                 04/11/2018              ROXANA Mackey June 19, 2024 7:58 AM

## 2024-06-19 NOTE — PROGRESS NOTES
HPI       Diabetic Retinopathy Follow Up    In both eyes.  Associated symptoms include Negative for glare, haloes, dryness, eye pain, flashes and floaters.  Treatments tried include no treatments.  Pain was noted as 0/10. Additional comments:  Type 2 diabetes mellitus with both eyes affected by mild nonproliferative retinopathy and macular edema, with long-term current use of insulin             Comments    Pt states vision is the same.  No pain.  No flashes/floaters.    No ocular meds.    DM 2  BS were 98 this am.  Lab Results       Component                Value               Date                       A1C                      5.8                 03/01/2024                 A1C                      6.2                 11/30/2023                 A1C                      5.7                 07/31/2023                 A1C                      6.0                 09/27/2022                 A1C                      5.2                 03/03/2022                 A1C                      6.3                 03/31/2021                 A1C                      6.7                 02/12/2021                 A1C                      6.4                 11/24/2020                 A1C                      6.2                 10/06/2020                 A1C                      5.5                 04/11/2018              ROXANA Mackey June 19, 2024 7:58 AM              Last edited by Yulia Kumar COT on 6/19/2024  7:58 AM.         Review of systems for the eyes was negative other than the pertinent positives/negatives listed in the HPI.      Assessment & Plan    HPI:  Camacho Bhagat is a 58 year old male with history of T2DM, HTN, HLD, ED, liver transplant, laser in situ keratomileusis (LASIK) presents for followup Diabetic macular edema both eyes. Vision is decreasing somewhat left eye compared to last visit.     POHx: lasik  PMHx: 2DM, HTN, HLD, ED, liver transplant  Current Medications:   Current Outpatient Medications    Medication Sig Dispense Refill    alcohol swab prep pads Use to swab area of injection/francisca as directed. 100 each 3    alpha-lipoic acid 600 MG capsule Take 600 mg by mouth      amLODIPine (NORVASC) 10 MG tablet Take 1 tablet (10 mg) by mouth daily 90 tablet 3    augmented betamethasone dipropionate (DIPROLENE-AF) 0.05 % external ointment Apply twice daily as needed for rash on the legs 45 g 11    cholecalciferol (VITAMIN D3) 1000 UNIT tablet Take 1 tablet (1,000 Units) by mouth daily (Patient taking differently: Take 1,000 Units by mouth every morning) 100 tablet 3    Continuous Blood Gluc Sensor (DEXCOM G6 SENSOR) MISC Change every 10 days. 9 each 3    Continuous Blood Gluc Transmit (DEXCOM G6 TRANSMITTER) MISC Change every 3 months. 1 each 3    cyclobenzaprine (FLEXERIL) 10 MG tablet Take 1 tablet (10 mg) by mouth 3 times daily as needed for muscle spasms 90 tablet 3    doxazosin (CARDURA) 8 MG tablet Take 1 tablet (8 mg) by mouth at bedtime Take a total of 10 mg at bedtime 90 tablet 3    empagliflozin (JARDIANCE) 10 MG TABS tablet Take 1 tablet (10 mg) by mouth daily 90 tablet 1    fluticasone (FLONASE) 50 MCG/ACT nasal spray Spray 1 spray into both nostrils daily 20 mL 3    furosemide (LASIX) 40 MG tablet Take 1.5 tablets (60 mg) by mouth 2 times daily . 200 tablet 3    gabapentin (NEURONTIN) 800 MG tablet Take 1 tablet (800 mg) by mouth 3 times daily 90 tablet 11    hydrOXYzine (ATARAX) 25 MG tablet Take 1 tablet (25 mg) by mouth 3 times daily as needed for itching 90 tablet 3    Insulin Disposable Pump (OMNIPOD 5 G6 INTRO, GEN 5,) KIT 1 kit daily 1 kit 0    Insulin Disposable Pump (OMNIPOD 5 G6 POD, GEN 5,) MISC 1 each See Admin Instructions Change every 2 days. Use for insulin administration. 45 each 3    Insulin Disposable Pump (OMNIPOD 5 G6 POD, GEN 5,) MISC 1 each See Admin Instructions Use per 's instructions. 1 each 1    Insulin Lispro (HUMALOG KWIKPEN) 200 UNIT/ML soln To be used in the  insulin pump. Total daily dose up to 170 U. 72 mL 3    insulin pen needle (BD ENE U/F) 32G X 4 MM miscellaneous Use 1 pen needle 4 times daily or as directed. 400 each 1    ketorolac (ACULAR) 0.5 % ophthalmic solution Place 1 drop into the right eye 4 times daily Start 2 days prior to surgery four times a day, after surgery: Four times a day x 1 week, three times a day x 1 week, twice a day x 1 week, daily x 1 week then stop 10 mL 0    lidocaine (LIDODERM) 5 % patch Place 1 patch onto the skin every 24 hours To prevent lidocaine toxicity, patient should be patch free for 12 hrs daily. 30 patch 11    lidocaine (XYLOCAINE) 5 % external ointment Apply topically as needed for moderate pain 90 g 11    losartan (COZAAR) 25 MG tablet Take 1 tablet (25 mg) by mouth daily 90 tablet 3    methocarbamol (ROBAXIN) 500 MG tablet Take 1 tablet (500 mg) by mouth 2 times daily as needed for muscle spasms 60 tablet 0    metoprolol succinate ER (TOPROL XL) 200 MG 24 hr tablet Take 1 tablet (200 mg) by mouth daily 90 tablet 3    moxifloxacin (VIGAMOX) 0.5 % ophthalmic solution Place 1 drop into the right eye 4 times daily Start 2 days prior to surgery four times a day, after surgery: Four times a day x 1 week 3 mL 0    mycophenolic acid (GENERIC EQUIVALENT) 360 MG EC tablet Take 2 tablets (720 mg) by mouth every 12 hours 360 tablet 3    oxyCODONE (ROXICODONE) 5 MG tablet Take 1 tablet (5 mg) by mouth 4 times daily as needed for pain maximum 6 tablet(s) per day 30 tablet 0    prednisoLONE acetate (PRED FORTE) 1 % ophthalmic suspension Place 1-2 drops into the right eye 4 times daily after surgery: Four times a day x 1 week, three times a day x 1 week, twice a day x 1 week, daily x 1 week then stop 10 mL 0    predniSONE (DELTASONE) 2.5 MG tablet Take 1 tablet (2.5 mg) by mouth daily 90 tablet 3    sildenafil (REVATIO) 20 MG tablet Take 2 tablets (40 mg) by mouth daily as needed (erectile dysfunction) 10 tablet 11    tacrolimus (GENERIC  EQUIVALENT) 1 MG capsule Take 4 capsules (4 mg) by mouth every morning AND 3 capsules (3 mg) every evening. 630 capsule 3    tretinoin (RETIN-A) 0.025 % external cream Apply a pea-sized amount evenly over the face at nighttime before bed 45 g 11    triamcinolone (KENALOG) 0.1 % external ointment Apply topically 2 times daily to the itching on the legs 80 g 1    ursodiol (ACTIGALL) 500 MG tablet Take 1 tablet (500 mg) by mouth 2 times daily 180 tablet 3     No current facility-administered medications for this visit.     PSHx: Cataract extraction/intraocular lens right eye 2/1/24      Current Eye Medications:    Assessment & Plan:  (Z96.1) Pseudophakia, right eye  (primary encounter diagnosis)  Cataract extraction/intraocular lens right eye Roque 2/1/24    (H25.812) Combined forms of age-related cataract of left eye  Mild cataracts are present and may account for some of the patient's visual complaints. No treatment currently recommended. The patient will monitor for vision changes and contact us with any decrease in vision. Recheck next visit     (Z98.890) H/O laser assisted in situ keratomileusis  2003 with mild regression left eye     (H52.03,  H52.203,  H52.4) Hyperopia of both eyes with astigmatism and presbyopia  DOing well with current MRx      (E11.3213,  Z79.4) Type 2 diabetes mellitus with both eyes affected by mild nonproliferative retinopathy and macular edema, with long-term current use of insulin (H)  Diagnosed 2003  Most recent HgBA1c 5.8 on 3/1/24  Mild background diabetic retinopathy without neovascularization noted on today's exam.  Discussed ocular and systemic benefits of blood pressure and blood sugar control.  Mild Diabetic macular edema noted  Good blood glucose control and best corrected visual acuity left eye 20/25+. Discussed possible need for injections in the future  Return to clinic 3-4 months, v/t/father/oct macula    (H04.129) Dry eye    Return in about 3 months (around 9/19/2024)  for Follow Up-v/t/d, OCT Macula.        Stan Roque MD     Attending Physician Attestation:  Complete documentation of historical and exam elements from today's encounter can be found in the full encounter summary report (not reduplicated in this progress note).  I personally obtained the chief complaint(s) and history of present illness.  I confirmed and edited as necessary the review of systems, past medical/surgical history, family history, social history, and examination findings as documented by others; and I examined the patient myself.  I personally reviewed the relevant tests, images, and reports as documented above.  I formulated and edited as necessary the assessment and plan and discussed the findings and management plan with the patient and family. - Stan Roque MD

## 2024-07-03 ENCOUNTER — OFFICE VISIT (OUTPATIENT)
Dept: ENDOCRINOLOGY | Facility: CLINIC | Age: 59
End: 2024-07-03
Payer: COMMERCIAL

## 2024-07-03 VITALS
DIASTOLIC BLOOD PRESSURE: 70 MMHG | OXYGEN SATURATION: 98 % | SYSTOLIC BLOOD PRESSURE: 137 MMHG | BODY MASS INDEX: 40.52 KG/M2 | HEIGHT: 71 IN | HEART RATE: 65 BPM | WEIGHT: 289.4 LBS

## 2024-07-03 DIAGNOSIS — Z79.4 TYPE 2 DIABETES MELLITUS WITH DIABETIC POLYNEUROPATHY, WITH LONG-TERM CURRENT USE OF INSULIN (H): ICD-10-CM

## 2024-07-03 DIAGNOSIS — E66.813 CLASS 3 SEVERE OBESITY WITH SERIOUS COMORBIDITY AND BODY MASS INDEX (BMI) OF 40.0 TO 44.9 IN ADULT, UNSPECIFIED OBESITY TYPE (H): Primary | ICD-10-CM

## 2024-07-03 DIAGNOSIS — E66.01 CLASS 3 SEVERE OBESITY WITH SERIOUS COMORBIDITY AND BODY MASS INDEX (BMI) OF 40.0 TO 44.9 IN ADULT, UNSPECIFIED OBESITY TYPE (H): Primary | ICD-10-CM

## 2024-07-03 DIAGNOSIS — E11.42 TYPE 2 DIABETES MELLITUS WITH DIABETIC POLYNEUROPATHY, WITH LONG-TERM CURRENT USE OF INSULIN (H): ICD-10-CM

## 2024-07-03 DIAGNOSIS — Z94.4 S/P LIVER TRANSPLANT (H): ICD-10-CM

## 2024-07-03 PROCEDURE — 99215 OFFICE O/P EST HI 40 MIN: CPT | Performed by: NURSE PRACTITIONER

## 2024-07-03 PROCEDURE — 99417 PROLNG OP E/M EACH 15 MIN: CPT | Performed by: NURSE PRACTITIONER

## 2024-07-03 ASSESSMENT — PAIN SCALES - GENERAL: PAINLEVEL: NO PAIN (0)

## 2024-07-03 NOTE — Clinical Note
Hi Dr. Kirkpatrick and Dr. West- I met with Camacho in weight management today. I understand Dr. West have tried to get him to retry GLp1 in the past but he had previously been resistant. He decided to try mounjaro and stay at very low doses as tolerated. Hoping to avoid nausea/ vomiting with tirzepatide. He declined working with [facility] at this time and manage his own pump. Just wanted to keep everyone on the same page! Let me know if you have questions

## 2024-07-03 NOTE — PROGRESS NOTES
"70 minutes spent by me on the date of the encounter doing chart review, history and exam, documentation and further activities per the note    New Medical Weight Management Consult    PATIENT:  Camacho Bhagat  MRN:         [id number]  :         1965  BISI:         7/3/2024    Dear Dr. Kirkpatrick,    I had the pleasure of seeing your patient, Camacho Bhagat. Full intake/assessment was done to determine barriers to weight loss success and develop a treatment plan. Camacho Bhagat is a 58 year old male interested in treatment of medical problems associated with excess weight. He has a height of 5' 11\", a weight of 289 lbs 6.4 oz, and the calculated Body mass index is 40.36 kg/m .      Assessment & Plan   Problem List Items Addressed This Visit          Nervous and Auditory    Type 2 diabetes mellitus with diabetic polyneuropathy, with long-term current use of insulin (H)    Relevant Medications    tirzepatide (MOUNJARO) 2.5 MG/0.5ML pen       Digestive    Class 3 severe obesity with serious comorbidity and body mass index (BMI) of 40.0 to 44.9 in adult (H) - Primary     Adult onset weight gain which has been more difficult to manage over the years.  Diagnosis of liver cancer in  followed by liver transplant and subsequent partial colectomy due to CMV contributed to prolonged immobility and deconditioning through 2018.  Now, chronic pain and deconditioning make activity levels difficult to maintain and finds it difficult to lose weight.  Recently, blood pressures have been elevated which is impacting kidney function and patient knows weight loss will improve both of these.  7 years status post liver transplant with a history of type 2 diabetes, fibromyalgia, CKD, DVT, HTN, RONNIE, OA.  He has questions today about Wegovy which she has been seeing patients taking in his clinic recently and would like to know more about this.    Type 2 diabetes is very well-controlled with an insulin pump and CGM.  He also takes Jardiance.  " "He is followed by endocrinology.  Notably there is some postprandial hyperglycemia and at last visit they re addressed the use of a GLP-1 agonist for better glucose control.  Prior to liver transplant patient took Victoza for several months with \"projectile vomiting \"which franci him from trying this medication again.  We discussed Mounjaro today which has been associated with less GI side effects.  We discussed the benefits of Mounjaro to include weight loss and better glycemic control while reducing the risk insulin requirements.  He is open to trying this today especially knowing this is a weekly injection instead of a daily injection.  We discussed staying at low doses until we know he is tolerating the medication well especially given prior intolerance of Victoza.  We discussed eating and lifestyle patterns to mitigate adverse side effects while starting a GLP-1/GIP.  Specifically advised patient to start with smaller portions to avoid overeating.    Aware of risk for hypoglycemia while introducing GLp1 while taking insulin. Comfortable with blood sugar management with CGM and insulin pump. Prefers to not work with MTM at this time and will reach out if he is having issues with hypoglycemia and adjusting insulin. Also followed by endocrinology. Discussed strategies to avoid nausea/ vomiting.     Naltrexone is contraindicated given his chronic opioid requirements for chronic pain.  We are avoiding phentermine due to cardiovascular risks.  We did consider topiramate although we would need to be very mindful of risk for acidosis.  He does very well with hydration although he continues to have elevations in creatinine recently.        Start mounjaro 2.5mg   Pay attention to adjusting insulin 3-4 weeks after starting mounjaro  Great work with fluids   See dietitian   Follow up with me in 3 months              Relevant Medications    tirzepatide (MOUNJARO) 2.5 MG/0.5ML pen       Other    S/P liver transplant (H)    " "Relevant Medications    tirzepatide (MOUNJARO) 2.5 MG/0.5ML pen            He has the following co-morbidities:        6/28/2024     3:58 PM   --   I have the following health issues associated with obesity Type II Diabetes    High Blood Pressure    Sleep Apnea    Cancer    Osteoarthritis (joint disease)   I have the following symptoms associated with obesity Knee Pain    Lower Extremity Swelling    Back Pain    Fatigue    Hip Pain       DMII followed by Dr. West - insulin pump, CGM, jardiance- vomiting previously with victoza. Some post prandial hyperglycemia     S/p liver transplant with hx fibromyalgia, CKD, DVT, HTN, RONNIE, OA    GI- Dr. Camejo - S/p partial colectomy around time of transplant -with dumping like symptoms     Sleep apnea- cpap  helpful   No reflux         6/28/2024     3:58 PM   Referring Provider   Please name the provider who referred you to Medical Weight Management  If you do not know, please answer \"I Don't Know\" Dr Kirkpatrick           6/28/2024     3:58 PM   Weight History   How concerned are you about your weight? Somewhat Concerned   I became overweight In College   The following factors have contributed to my weight gain Started on Medication that Caused Weight Gain    A Health Crisis    Eating Too Much    Lack of Exercise   I have tried the following methods to lose weight Watching Portions or Calories    Weight Watchers   My lowest weight since age 17 was 160   My highest weight since age 17 was 400   The most weight I have ever lost was (lbs) 240   I have the following family history of obesity/being overweight My father is overweight   How has your weight changed over the last year? Gained   How many pounds? 20             6/28/2024     3:58 PM   Diet Recall Review with Patient   If you do eat breakfast, what types of food do you eat? Hot meals   If you do eat lunch, what types of food do you typically eat? Sandwhich, chips   If you do eat supper, what types of food do you typically eat? " Poultry lean pork vegetable   How many glasses of juice do you drink in a typical day? 0   How many of glasses of milk do you drink in a typical day? 2   If you do drink milk, what type? Skim   How many 8oz glasses of sugar containing drinks such as Craig-Aid/sweet tea do you drink in a day? 0   How many cans/bottles of sugar pop/soda/tea/sports drinks do you drink in a day? 0   How many cans/bottles of diet pop/soda/tea or sports drink do you drink in a day? 0   How often do you have a drink of alcohol? Never     Breakfast- eggs, sausage, oatmeal   Lunch- sandwich/ chips - sometimes cookie   Dinner around 630 - protein and veggies   Not a lot of snacking     Because of colectomy has to avoid salad, fatty foods, some cooked veggies from high potassium   Unsweetened ice tea   1 gallon of water/ ice tea and milk/water daily         6/28/2024     3:58 PM   Eating Habits   Generally, my meals include foods like these bread, pasta, rice, potatoes, corn, crackers, sweet dessert, pop, or juice Almost Everyday   Generally, my meals include foods like these fried meats, brats, burgers, french fries, pizza, cheese, chips, or ice cream Once a Week   Eat fast food (like McDonalds, [employer] Gabriel, Taco Bell) Never   Eat at a buffet or sit-down restaurant Never   Eat most of my meals in front of the TV or computer A Few Times a Week   Often skip meals, eat at random times, have no regular eating times Almost Everyday   Rarely sit down for a meal but snack or graze throughout Never   Eat extra snacks between meals Never   Eat most of my food at the end of the day Never   Eat in the middle of the night or wake up at night to eat Never   Eat extra snacks to prevent or correct low blood sugar Never   Eat to prevent acid reflux or stomach pain Never   Worry about not having enough food to eat Never   I eat when I am depressed Never   I eat when I am stressed Never   I eat when I am bored Never   I eat when I am anxious Never   I eat when  I am happy or as a reward Never   I feel hungry all the time even if I just have eaten Never   Feeling full is important to me Less Than Weekly   I finish all the food on my plate even if I am already full Almost Everyday   I can't resist eating delicious food or walk past the good food/smell Never   I eat/snack without noticing that I am eating Never   I eat when I am preparing the meal Never   I eat more than usual when I see others eating Never   I have trouble not eating sweets, ice cream, cookies, or chips if they are around the house Never   I think about food all day Never   What foods, if any, do you crave? None     Sometimes misses lunch - busy at work - maybe 3-4 times a month         6/28/2024     3:58 PM   Amount of Food   I feel out of control when eating Never   I eat a large amount of food, like a loaf of bread, a box of cookies, a pint/quart of ice cream, all at once Never   I eat a large amount of food even when I am not hungry Never   I eat rapidly Never   I eat alone because I feel embarrassed and do not want others to see how much I have eaten Never   I eat until I am uncomfortably full Monthly   I feel bad, disgusted, or guilty after I overeat Never           6/28/2024     3:58 PM   Activity/Exercise History   How much of a typical 12 hour day do you spend sitting? Most of the Day   How much of a typical 12 hour day do you spend lying down? Less Than Half the Day   How much of a typical day do you spend walking/standing? Half the Day   How many hours (not including work) do you spend on the TV/Video Games/Computer/Tablet/Phone? 2-3 Hours   How many times a week are you active for the purpose of exercise? 2-3 Times a Week   What keeps you from being more active? Pain    Shortness of Breath    Lack of Time   How many total minutes do you spend doing some activity for the purpose of exercising when you exercise? None     Sometimes commute ends up being 1 hour each way   Works 8 hour days so  difficult to get activity during the week   On the weekend busy with home care     Plays with dog     Severe foot pain   Arthritis limits activity   SI pain within walking 140 feet     Difficulty with PT after transplant     PAST MEDICAL HISTORY:  Past Medical History:   Diagnosis Date    Cancer (H) 12/15    Stage 4 liver cancer    Diabetes type 2, controlled (H) 11/10/2016    Esophageal reflux     Fibromyalgia 01/2009    dx with Dr Benitez( Rheum)    Gangrene of finger (H) 08/25/2017    H/O deep venous thrombosis 11/2001    Permanent IVC filter in place.    H/O CASTAÑEDA (nonalcoholic steatohepatitis)     H/O Pneumonia, organism unspecified(486) 10/2001    Included ARDS, sepsis, and  acute renal failure; hospitalized, required tracheostomy placement.    H/O: HTN (hypertension) 11/2001    No longer prescribed antihypertensive medication.      History of hepatocellular carcinoma     History of liver transplant (H)     History of obstructive sleep apnea     No longer wears CPAP since losing approximately 200 pounds with his liver transplant and its complications.      HLD (hyperlipidemia)     Hypertension     Ischemia of both lower extremities 08/25/2017    Distal ischemia due to shock/high pressor requirements    Liver transplant rejection (H) 06/11/2018    Neutropenic colitis  (H24) 07/04/2017    Osteoarthritis     Presence of PERMANENT IVC filter     Rheumatoid arthritis(714.0)     remission for many years           6/28/2024     3:58 PM   Work/Social History Reviewed With Patient   My employment status is Full-Time   My job is Medical assistant   How much of your job is spent on the computer or phone? 75%   How many hours do you spend commuting to work daily? When in clinc 70 pecent inbasket days 100 percent   What is your marital status? /In a Relationship   If in a relationship, is your significant other overweight? No   If you have children, are they overweight? Yes   Who do you live with? My dog   Who does  the food shopping? I do           6/28/2024     3:58 PM   Mental Health History Reviewed With Patient   Have you ever been physically or sexually abused? No   How often in the past 2 weeks have you felt little interest or pleasure in doing things? Not at all   Over the past 2 weeks how often have you felt down, depressed, or hopeless? Not at all     Doesn't work with therapist     MEDICATIONS:   Current Outpatient Medications   Medication Sig Dispense Refill    tirzepatide (MOUNJARO) 2.5 MG/0.5ML pen Inject 2.5 mg Subcutaneous every 7 days 2 mL 1    alcohol swab prep pads Use to swab area of injection/francisca as directed. 100 each 3    alpha-lipoic acid 600 MG capsule Take 600 mg by mouth      amLODIPine (NORVASC) 10 MG tablet Take 1 tablet (10 mg) by mouth daily 90 tablet 3    augmented betamethasone dipropionate (DIPROLENE-AF) 0.05 % external ointment Apply twice daily as needed for rash on the legs 45 g 11    cholecalciferol (VITAMIN D3) 1000 UNIT tablet Take 1 tablet (1,000 Units) by mouth daily (Patient taking differently: Take 1,000 Units by mouth every morning) 100 tablet 3    Continuous Blood Gluc Sensor (DEXCOM G6 SENSOR) MISC Change every 10 days. 9 each 3    Continuous Blood Gluc Transmit (DEXCOM G6 TRANSMITTER) MISC Change every 3 months. 1 each 3    cyclobenzaprine (FLEXERIL) 10 MG tablet Take 1 tablet (10 mg) by mouth 3 times daily as needed for muscle spasms 90 tablet 3    doxazosin (CARDURA) 8 MG tablet Take 1 tablet (8 mg) by mouth at bedtime Take a total of 10 mg at bedtime 90 tablet 3    empagliflozin (JARDIANCE) 10 MG TABS tablet Take 1 tablet (10 mg) by mouth daily 90 tablet 1    fluticasone (FLONASE) 50 MCG/ACT nasal spray Spray 1 spray into both nostrils daily 20 mL 3    furosemide (LASIX) 40 MG tablet Take 1.5 tablets (60 mg) by mouth 2 times daily . 200 tablet 3    gabapentin (NEURONTIN) 800 MG tablet Take 1 tablet (800 mg) by mouth 3 times daily 90 tablet 11    hydrOXYzine (ATARAX) 25 MG  tablet Take 1 tablet (25 mg) by mouth 3 times daily as needed for itching 90 tablet 3    Insulin Disposable Pump (OMNIPOD 5 G6 INTRO, GEN 5,) KIT 1 kit daily 1 kit 0    Insulin Disposable Pump (OMNIPOD 5 G6 POD, GEN 5,) MISC 1 each See Admin Instructions Change every 2 days. Use for insulin administration. 45 each 3    Insulin Disposable Pump (OMNIPOD 5 G6 POD, GEN 5,) MISC 1 each See Admin Instructions Use per 's instructions. 1 each 1    Insulin Lispro (HUMALOG KWIKPEN) 200 UNIT/ML soln To be used in the insulin pump. Total daily dose up to 170 U. 72 mL 3    insulin pen needle (BD ENE U/F) 32G X 4 MM miscellaneous Use 1 pen needle 4 times daily or as directed. 400 each 1    ketorolac (ACULAR) 0.5 % ophthalmic solution Place 1 drop into the right eye 4 times daily Start 2 days prior to surgery four times a day, after surgery: Four times a day x 1 week, three times a day x 1 week, twice a day x 1 week, daily x 1 week then stop 10 mL 0    lidocaine (LIDODERM) 5 % patch Place 1 patch onto the skin every 24 hours To prevent lidocaine toxicity, patient should be patch free for 12 hrs daily. 30 patch 11    lidocaine (XYLOCAINE) 5 % external ointment Apply topically as needed for moderate pain 90 g 11    losartan (COZAAR) 25 MG tablet Take 1 tablet (25 mg) by mouth daily 90 tablet 3    methocarbamol (ROBAXIN) 500 MG tablet Take 1 tablet (500 mg) by mouth 2 times daily as needed for muscle spasms 60 tablet 0    metoprolol succinate ER (TOPROL XL) 200 MG 24 hr tablet Take 1 tablet (200 mg) by mouth daily 90 tablet 3    moxifloxacin (VIGAMOX) 0.5 % ophthalmic solution Place 1 drop into the right eye 4 times daily Start 2 days prior to surgery four times a day, after surgery: Four times a day x 1 week 3 mL 0    mycophenolic acid (GENERIC EQUIVALENT) 360 MG EC tablet Take 2 tablets (720 mg) by mouth every 12 hours 360 tablet 3    oxyCODONE (ROXICODONE) 5 MG tablet Take 1 tablet (5 mg) by mouth 4 times daily as  needed for pain maximum 6 tablet(s) per day 30 tablet 0    prednisoLONE acetate (PRED FORTE) 1 % ophthalmic suspension Place 1-2 drops into the right eye 4 times daily after surgery: Four times a day x 1 week, three times a day x 1 week, twice a day x 1 week, daily x 1 week then stop 10 mL 0    predniSONE (DELTASONE) 2.5 MG tablet Take 1 tablet (2.5 mg) by mouth daily 90 tablet 3    sildenafil (REVATIO) 20 MG tablet Take 2 tablets (40 mg) by mouth daily as needed (erectile dysfunction) 10 tablet 11    tacrolimus (GENERIC EQUIVALENT) 1 MG capsule Take 4 capsules (4 mg) by mouth every morning AND 3 capsules (3 mg) every evening. 630 capsule 3    tretinoin (RETIN-A) 0.025 % external cream Apply a pea-sized amount evenly over the face at nighttime before bed 45 g 11    triamcinolone (KENALOG) 0.1 % external ointment Apply topically 2 times daily to the itching on the legs 80 g 1    ursodiol (ACTIGALL) 500 MG tablet Take 1 tablet (500 mg) by mouth 2 times daily 180 tablet 3       ALLERGIES:   Allergies   Allergen Reactions    Erythromycin GI Disturbance    Losartan Other (See Comments)     Consistently develops hyperkalemia    Vioxx      Nausea, vomiting       Lab on 05/23/2024   Component Date Value Ref Range Status    Sodium 05/23/2024 141  135 - 145 mmol/L Final    Reference intervals for this test were updated on 09/26/2023 to more accurately reflect our healthy population. There may be differences in the flagging of prior results with similar values performed with this method. Interpretation of those prior results can be made in the context of the updated reference intervals.     Potassium 05/23/2024 5.0  3.4 - 5.3 mmol/L Final    Chloride 05/23/2024 112 (H)  98 - 107 mmol/L Final    Carbon Dioxide (CO2) 05/23/2024 19 (L)  22 - 29 mmol/L Final    Anion Gap 05/23/2024 10  7 - 15 mmol/L Final    Glucose 05/23/2024 122 (H)  70 - 99 mg/dL Final    Urea Nitrogen 05/23/2024 50.5 (H)  8.0 - 23.0 mg/dL Final    Creatinine  05/23/2024 2.02 (H)  0.67 - 1.17 mg/dL Final    GFR Estimate 05/23/2024 37 (L)  >60 mL/min/1.73m2 Final    Calcium 05/23/2024 8.9  8.6 - 10.0 mg/dL Final    Albumin 05/23/2024 4.0  3.5 - 5.2 g/dL Final    Phosphorus 05/23/2024 3.4  2.5 - 4.5 mg/dL Final    WBC Count 05/23/2024 5.3  4.0 - 11.0 10e3/uL Final    RBC Count 05/23/2024 3.58 (L)  4.40 - 5.90 10e6/uL Final    Hemoglobin 05/23/2024 10.7 (L)  13.3 - 17.7 g/dL Final    Hematocrit 05/23/2024 32.6 (L)  40.0 - 53.0 % Final    MCV 05/23/2024 91  78 - 100 fL Final    MCH 05/23/2024 29.9  26.5 - 33.0 pg Final    MCHC 05/23/2024 32.8  31.5 - 36.5 g/dL Final    RDW 05/23/2024 13.3  10.0 - 15.0 % Final    Platelet Count 05/23/2024 102 (L)  150 - 450 10e3/uL Final    Tacrolimus by Tandem Mass Spectrom* 05/23/2024 2.7 (L)  5.0 - 15.0 ug/L Final    Comment: Tacrolimus Reference Range (ug/L):    Kidney Transplant:  Pediatric  0-3 months post transplant: 10-12  3-6 months post transplant: 8-10  6-12 months post transplant: 6-8  >12 months post transplant: 4-7    Adult  0-6 months post transplant: 8-10  6-12 months post transplant: 6-8  >12 months post transplant: 4-6  >5 years post transplant: 3-5    Heart Transplant:  Pediatric  0-12 months post transplant: 10-15  >12 months post transplant: 5-10    Adult  0-3 months post transplant: 10-15  3-6 months post transplant: 8-12  6-12 months post transplant: 6-12  >12 months post transplant: 6-10    Lung Transplant:  0-12 months post transplant: 10-15  >12 months post transplant: 8-12    Liver Transplant:  Pediatric  0-3 months post transplant: 10-15  3-6 months post transplant: 8-10  6 months-5 years post transplant: 6-8   >5 years post transplant: 1-3    Adult  0-3 months post transplant: 10-12  3-6 months post transplant: 8-10  >6 months post transplant: 6-8    Pancreas Transplant:  0-6                            months post transplant: 8-10  >6 months post transplant: 5-8    Tacrolimus Last Dose Date 05/23/2024 5/22/2024    "Final    Tacrolimus Last Dose Time 05/23/2024  7:00 PM   Final    Alkaline Phosphatase 05/23/2024 155 (H)  40 - 150 U/L Final    ALT 05/23/2024 22  0 - 70 U/L Final    AST 05/23/2024 21  0 - 45 U/L Final    Bilirubin Total 05/23/2024 0.4  <=1.2 mg/dL Final    Bilirubin Direct 05/23/2024 <0.20  0.00 - 0.30 mg/dL Final    Protein Total 05/23/2024 6.4  6.4 - 8.3 g/dL Final       Anti-obesity medication ROS:    HEENT  Hx of glaucoma: No    Cardiovascular  CAD:No  HTN:Yes    Gastrointestinal  GERD:No  Constipation/diarrhea/GI issues:Yes  Liver Dz:Yes  FIB-4 Calculation: 2.59 at 5/23/2024  7:21 AM  Calculated from:  SGOT/AST: 21 U/L at 5/23/2024  7:21 AM  SGPT/ALT: 22 U/L at 5/23/2024  7:21 AM  Platelets: 102 10e3/uL at 5/23/2024  7:21 AM  Age: 58 years    H/O Pancreatitis: prior to liver transplant    Psychiatric  Bipolar: No  Anxiety:No  Depression:No  History of alcohol/drug abuse: No  Hx of eating disorder:No    Endocrine  Personal or family hx of MTC or MEN2:No-    Diabetes/prediabetes: Yes    Neurologic:  Hx of seizures: No  Hx of migraines: No  Memory Impairment: No  Chronic pain/opioids use: Yes      History of kidney stones: No  Kidney disease: Yes        3/1/2024    10:57 AM   REGGIE Score (Last Two)   REGGIE Raw Score 40   Activation Score 100   REGGIE Level 4         PHYSICAL EXAM:  Objective    /70   Pulse 65   Ht 1.803 m (5' 11\")   Wt 131.3 kg (289 lb 6.4 oz)   SpO2 98%   BMI 40.36 kg/m    Physical Exam   GENERAL: alert and no distress  EYES: Eyes grossly normal to inspection.  No discharge or erythema, or obvious scleral/conjunctival abnormalities.  RESP: No audible wheeze, cough, or visible cyanosis.    SKIN: Visible skin clear. No significant rash, abnormal pigmentation or lesions.  NEURO: Cranial nerves grossly intact.  Mentation and speech appropriate for age.  PSYCH: Appropriate affect, tone, and pace of words     FIB-4 Calculation: 2.59 at 5/23/2024  7:21 AM  Calculated from:  SGOT/AST: 21 U/L at " 5/23/2024  7:21 AM  SGPT/ALT: 22 U/L at 5/23/2024  7:21 AM  Platelets: 102 10e3/uL at 5/23/2024  7:21 AM  Age: 58 years    Fib-4 < 1.3: No further evaluation at this point, unless other concerns  - If the Fib-4 is > 2.67,  Fibroscan and elective liver clinic referral  - Intermediate Fib-4 scores: Get a Fibroscan, consider repeating this in 1-2 years.    Sincerely,    Jennifer Vieira NP

## 2024-07-03 NOTE — ASSESSMENT & PLAN NOTE
"Adult onset weight gain which has been more difficult to manage over the years.  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  Now, chronic pain and deconditioning make activity levels difficult to maintain and finds it difficult to lose weight.  Recently, blood pressures have been elevated which is impacting kidney function and patient knows weight loss will improve both of these.  7 years status post liver transplant with a history of type 2 diabetes, fibromyalgia, CKD, DVT, HTN, RONNIE, OA.  He has questions today about Wegovy which she has been seeing patients taking in his clinic recently and would like to know more about this.    Type 2 diabetes is very well-controlled with an insulin pump and CGM.  He also takes Jardiance.  He is followed by endocrinology.  Notably there is some postprandial hyperglycemia and at last visit they re addressed the use of a GLP-1 agonist for better glucose control.  Prior to liver transplant patient took Victoza for several months with \"projectile vomiting \"which franci him from trying this medication again.  We discussed Mounjaro today which has been associated with less GI side effects.  We discussed the benefits of Mounjaro to include weight loss and better glycemic control while reducing the risk insulin requirements.  He is open to trying this today especially knowing this is a weekly injection instead of a daily injection.  We discussed staying at low doses until we know he is tolerating the medication well especially given prior intolerance of Victoza.  We discussed eating and lifestyle patterns to mitigate adverse side effects while starting a GLP-1/GIP.  Specifically advised patient to start with smaller portions to avoid overeating.    Aware of risk for hypoglycemia while introducing GLp1 while taking insulin. Comfortable with blood sugar " management with CGM and insulin pump. Prefers to not work with MTM at this time and will reach out if he is having issues with hypoglycemia and adjusting insulin. Also followed by endocrinology. Discussed strategies to avoid nausea/ vomiting.     Naltrexone is contraindicated given his chronic opioid requirements for chronic pain.  We are avoiding phentermine due to cardiovascular risks.  We did consider topiramate although we would need to be very mindful of risk for acidosis.  He does very well with hydration although he continues to have elevations in creatinine recently.        Start mounjaro 2.5mg   Pay attention to adjusting insulin 3-4 weeks after starting mounjaro  Great work with fluids   See dietitian   Follow up with me in 3 months

## 2024-07-03 NOTE — LETTER
"7/3/2024       RE: Camacho Bhagat  [address]  [facility] [address]     Dear Colleague,    Thank you for referring your patient, Camacho Bhagat, to the [facility] [place] at [facility]. Please see a copy of my visit note below.    70 minutes spent by me on the date of the encounter doing chart review, history and exam, documentation and further activities per the note    New Medical Weight Management Consult    PATIENT:  Camacho Bhagat  MRN:         [id number]  :         1965  BISI:         7/3/2024    Dear Dr. Kirkpatrick,    I had the pleasure of seeing your patient, Camacho Bhagat. Full intake/assessment was done to determine barriers to weight loss success and develop a treatment plan. Camacho Bhagat is a 58 year old male interested in treatment of medical problems associated with excess weight. He has a height of 5' 11\", a weight of 289 lbs 6.4 oz, and the calculated Body mass index is 40.36 kg/m .      Assessment & Plan  Problem List Items Addressed This Visit          Nervous and Auditory    Type 2 diabetes mellitus with diabetic polyneuropathy, with long-term current use of insulin (H)    Relevant Medications    tirzepatide (MOUNJARO) 2.5 MG/0.5ML pen       Digestive    Class 3 severe obesity with serious comorbidity and body mass index (BMI) of 40.0 to 44.9 in adult (H) - Primary     Adult onset weight gain which has been more difficult to manage over the years.  Diagnosis of liver cancer in  followed by liver transplant and subsequent partial colectomy due to CMV contributed to prolonged immobility and deconditioning through 2018.  Now, chronic pain and deconditioning make activity levels difficult to maintain and finds it difficult to lose weight.  Recently, blood pressures have been elevated which is impacting kidney function and patient knows weight loss will improve both of these.  7 years status post liver " "transplant with a history of type 2 diabetes, fibromyalgia, CKD, DVT, HTN, RONNIE, OA.  He has questions today about Wegovy which she has been seeing patients taking in his clinic recently and would like to know more about this.    Type 2 diabetes is very well-controlled with an insulin pump and CGM.  He also takes Jardiance.  He is followed by endocrinology.  Notably there is some postprandial hyperglycemia and at last visit they re addressed the use of a GLP-1 agonist for better glucose control.  Prior to liver transplant patient took Victoza for several months with \"projectile vomiting \"which franci him from trying this medication again.  We discussed Mounjaro today which has been associated with less GI side effects.  We discussed the benefits of Mounjaro to include weight loss and better glycemic control while reducing the risk insulin requirements.  He is open to trying this today especially knowing this is a weekly injection instead of a daily injection.  We discussed staying at low doses until we know he is tolerating the medication well especially given prior intolerance of Victoza.  We discussed eating and lifestyle patterns to mitigate adverse side effects while starting a GLP-1/GIP.  Specifically advised patient to start with smaller portions to avoid overeating.    Aware of risk for hypoglycemia while introducing GLp1 while taking insulin. Comfortable with blood sugar management with CGM and insulin pump. Prefers to not work with MTM at this time and will reach out if he is having issues with hypoglycemia and adjusting insulin. Also followed by endocrinology. Discussed strategies to avoid nausea/ vomiting.     Naltrexone is contraindicated given his chronic opioid requirements for chronic pain.  We are avoiding phentermine due to cardiovascular risks.  We did consider topiramate although we would need to be very mindful of risk for acidosis.  He does very well with hydration although he continues to have " "elevations in creatinine recently.        Start mounjaro 2.5mg   Pay attention to adjusting insulin 3-4 weeks after starting mounjaro  Great work with fluids   See dietitian   Follow up with me in 3 months              Relevant Medications    tirzepatide (MOUNJARO) 2.5 MG/0.5ML pen       Other    S/P liver transplant (H)    Relevant Medications    tirzepatide (MOUNJARO) 2.5 MG/0.5ML pen            He has the following co-morbidities:        6/28/2024     3:58 PM   --   I have the following health issues associated with obesity Type II Diabetes    High Blood Pressure    Sleep Apnea    Cancer    Osteoarthritis (joint disease)   I have the following symptoms associated with obesity Knee Pain    Lower Extremity Swelling    Back Pain    Fatigue    Hip Pain       DMII followed by Dr. West - insulin pump, CGM, jardiance- vomiting previously with victoza. Some post prandial hyperglycemia     S/p liver transplant with hx fibromyalgia, CKD, DVT, HTN, RONNIE, OA    GI- Dr. Camejo - S/p partial colectomy around time of transplant -with dumping like symptoms     Sleep apnea- cpap  helpful   No reflux         6/28/2024     3:58 PM   Referring Provider   Please name the provider who referred you to Medical Weight Management  If you do not know, please answer \"I Don't Know\" Dr Kirkpatrick           6/28/2024     3:58 PM   Weight History   How concerned are you about your weight? Somewhat Concerned   I became overweight In College   The following factors have contributed to my weight gain Started on Medication that Caused Weight Gain    A Health Crisis    Eating Too Much    Lack of Exercise   I have tried the following methods to lose weight Watching Portions or Calories    Weight Watchers   My lowest weight since age 17 was 160   My highest weight since age 17 was 400   The most weight I have ever lost was (lbs) 240   I have the following family history of obesity/being overweight My father is overweight   How has your weight changed over " the last year? Gained   How many pounds? 20             6/28/2024     3:58 PM   Diet Recall Review with Patient   If you do eat breakfast, what types of food do you eat? Hot meals   If you do eat lunch, what types of food do you typically eat? Sandwhich, chips   If you do eat supper, what types of food do you typically eat? Poultry lean pork vegetable   How many glasses of juice do you drink in a typical day? 0   How many of glasses of milk do you drink in a typical day? 2   If you do drink milk, what type? Skim   How many 8oz glasses of sugar containing drinks such as Craig-Aid/sweet tea do you drink in a day? 0   How many cans/bottles of sugar pop/soda/tea/sports drinks do you drink in a day? 0   How many cans/bottles of diet pop/soda/tea or sports drink do you drink in a day? 0   How often do you have a drink of alcohol? Never     Breakfast- eggs, sausage, oatmeal   Lunch- sandwich/ chips - sometimes cookie   Dinner around 630 - protein and veggies   Not a lot of snacking     Because of colectomy has to avoid salad, fatty foods, some cooked veggies from high potassium   Unsweetened ice tea   1 gallon of water/ ice tea and milk/water daily         6/28/2024     3:58 PM   Eating Habits   Generally, my meals include foods like these bread, pasta, rice, potatoes, corn, crackers, sweet dessert, pop, or juice Almost Everyday   Generally, my meals include foods like these fried meats, brats, burgers, french fries, pizza, cheese, chips, or ice cream Once a Week   Eat fast food (like McDonalds, Burger Gabriel, Taco Bell) Never   Eat at a buffet or sit-down restaurant Never   Eat most of my meals in front of the TV or computer A Few Times a Week   Often skip meals, eat at random times, have no regular eating times Almost Everyday   Rarely sit down for a meal but snack or graze throughout Never   Eat extra snacks between meals Never   Eat most of my food at the end of the day Never   Eat in the middle of the night or wake up at  night to eat Never   Eat extra snacks to prevent or correct low blood sugar Never   Eat to prevent acid reflux or stomach pain Never   Worry about not having enough food to eat Never   I eat when I am depressed Never   I eat when I am stressed Never   I eat when I am bored Never   I eat when I am anxious Never   I eat when I am happy or as a reward Never   I feel hungry all the time even if I just have eaten Never   Feeling full is important to me Less Than Weekly   I finish all the food on my plate even if I am already full Almost Everyday   I can't resist eating delicious food or walk past the good food/smell Never   I eat/snack without noticing that I am eating Never   I eat when I am preparing the meal Never   I eat more than usual when I see others eating Never   I have trouble not eating sweets, ice cream, cookies, or chips if they are around the house Never   I think about food all day Never   What foods, if any, do you crave? None     Sometimes misses lunch - busy at work - maybe 3-4 times a month         6/28/2024     3:58 PM   Amount of Food   I feel out of control when eating Never   I eat a large amount of food, like a loaf of bread, a box of cookies, a pint/quart of ice cream, all at once Never   I eat a large amount of food even when I am not hungry Never   I eat rapidly Never   I eat alone because I feel embarrassed and do not want others to see how much I have eaten Never   I eat until I am uncomfortably full Monthly   I feel bad, disgusted, or guilty after I overeat Never           6/28/2024     3:58 PM   Activity/Exercise History   How much of a typical 12 hour day do you spend sitting? Most of the Day   How much of a typical 12 hour day do you spend lying down? Less Than Half the Day   How much of a typical day do you spend walking/standing? Half the Day   How many hours (not including work) do you spend on the TV/Video Games/Computer/Tablet/Phone? 2-3 Hours   How many times a week are you active  for the purpose of exercise? 2-3 Times a Week   What keeps you from being more active? Pain    Shortness of Breath    Lack of Time   How many total minutes do you spend doing some activity for the purpose of exercising when you exercise? None     Sometimes commute ends up being 1 hour each way   Works 8 hour days so difficult to get activity during the week   On the weekend busy with home care     Plays with dog     Severe foot pain   Arthritis limits activity   SI pain within walking 140 feet     Difficulty with PT after transplant     PAST MEDICAL HISTORY:  Past Medical History:   Diagnosis Date    Cancer (H) 12/15    Stage 4 liver cancer    Diabetes type 2, controlled (H) 11/10/2016    Esophageal reflux     Fibromyalgia 01/2009    dx with Dr Benitez( Rheum)    Gangrene of finger (H) 08/25/2017    H/O deep venous thrombosis 11/2001    Permanent IVC filter in place.    H/O CASTAÑEDA (nonalcoholic steatohepatitis)     H/O Pneumonia, organism unspecified(486) 10/2001    Included ARDS, sepsis, and  acute renal failure; hospitalized, required tracheostomy placement.    H/O: HTN (hypertension) 11/2001    No longer prescribed antihypertensive medication.      History of hepatocellular carcinoma     History of liver transplant (H)     History of obstructive sleep apnea     No longer wears CPAP since losing approximately 200 pounds with his liver transplant and its complications.      HLD (hyperlipidemia)     Hypertension     Ischemia of both lower extremities 08/25/2017    Distal ischemia due to shock/high pressor requirements    Liver transplant rejection (H) 06/11/2018    Neutropenic colitis  (H24) 07/04/2017    Osteoarthritis     Presence of PERMANENT IVC filter     Rheumatoid arthritis(714.0)     remission for many years           6/28/2024     3:58 PM   Work/Social History Reviewed With Patient   My employment status is Full-Time   My job is Medical assistant   How much of your job is spent on the computer or phone? 75%    How many hours do you spend commuting to work daily? When in clinc 70 pecent inbasket days 100 percent   What is your marital status? /In a Relationship   If in a relationship, is your significant other overweight? No   If you have children, are they overweight? Yes   Who do you live with? My dog   Who does the food shopping? I do           6/28/2024     3:58 PM   Mental Health History Reviewed With Patient   Have you ever been physically or sexually abused? No   How often in the past 2 weeks have you felt little interest or pleasure in doing things? Not at all   Over the past 2 weeks how often have you felt down, depressed, or hopeless? Not at all     Doesn't work with therapist     MEDICATIONS:   Current Outpatient Medications   Medication Sig Dispense Refill    tirzepatide (MOUNJARO) 2.5 MG/0.5ML pen Inject 2.5 mg Subcutaneous every 7 days 2 mL 1    alcohol swab prep pads Use to swab area of injection/francisca as directed. 100 each 3    alpha-lipoic acid 600 MG capsule Take 600 mg by mouth      amLODIPine (NORVASC) 10 MG tablet Take 1 tablet (10 mg) by mouth daily 90 tablet 3    augmented betamethasone dipropionate (DIPROLENE-AF) 0.05 % external ointment Apply twice daily as needed for rash on the legs 45 g 11    cholecalciferol (VITAMIN D3) 1000 UNIT tablet Take 1 tablet (1,000 Units) by mouth daily (Patient taking differently: Take 1,000 Units by mouth every morning) 100 tablet 3    Continuous Blood Gluc Sensor (DEXCOM G6 SENSOR) MISC Change every 10 days. 9 each 3    Continuous Blood Gluc Transmit (DEXCOM G6 TRANSMITTER) MISC Change every 3 months. 1 each 3    cyclobenzaprine (FLEXERIL) 10 MG tablet Take 1 tablet (10 mg) by mouth 3 times daily as needed for muscle spasms 90 tablet 3    doxazosin (CARDURA) 8 MG tablet Take 1 tablet (8 mg) by mouth at bedtime Take a total of 10 mg at bedtime 90 tablet 3    empagliflozin (JARDIANCE) 10 MG TABS tablet Take 1 tablet (10 mg) by mouth daily 90 tablet 1     fluticasone (FLONASE) 50 MCG/ACT nasal spray Spray 1 spray into both nostrils daily 20 mL 3    furosemide (LASIX) 40 MG tablet Take 1.5 tablets (60 mg) by mouth 2 times daily . 200 tablet 3    gabapentin (NEURONTIN) 800 MG tablet Take 1 tablet (800 mg) by mouth 3 times daily 90 tablet 11    hydrOXYzine (ATARAX) 25 MG tablet Take 1 tablet (25 mg) by mouth 3 times daily as needed for itching 90 tablet 3    Insulin Disposable Pump (OMNIPOD 5 G6 INTRO, GEN 5,) KIT 1 kit daily 1 kit 0    Insulin Disposable Pump (OMNIPOD 5 G6 POD, GEN 5,) MISC 1 each See Admin Instructions Change every 2 days. Use for insulin administration. 45 each 3    Insulin Disposable Pump (OMNIPOD 5 G6 POD, GEN 5,) MISC 1 each See Admin Instructions Use per 's instructions. 1 each 1    Insulin Lispro (HUMALOG KWIKPEN) 200 UNIT/ML soln To be used in the insulin pump. Total daily dose up to 170 U. 72 mL 3    insulin pen needle (BD ENE U/F) 32G X 4 MM miscellaneous Use 1 pen needle 4 times daily or as directed. 400 each 1    ketorolac (ACULAR) 0.5 % ophthalmic solution Place 1 drop into the right eye 4 times daily Start 2 days prior to surgery four times a day, after surgery: Four times a day x 1 week, three times a day x 1 week, twice a day x 1 week, daily x 1 week then stop 10 mL 0    lidocaine (LIDODERM) 5 % patch Place 1 patch onto the skin every 24 hours To prevent lidocaine toxicity, patient should be patch free for 12 hrs daily. 30 patch 11    lidocaine (XYLOCAINE) 5 % external ointment Apply topically as needed for moderate pain 90 g 11    losartan (COZAAR) 25 MG tablet Take 1 tablet (25 mg) by mouth daily 90 tablet 3    methocarbamol (ROBAXIN) 500 MG tablet Take 1 tablet (500 mg) by mouth 2 times daily as needed for muscle spasms 60 tablet 0    metoprolol succinate ER (TOPROL XL) 200 MG 24 hr tablet Take 1 tablet (200 mg) by mouth daily 90 tablet 3    moxifloxacin (VIGAMOX) 0.5 % ophthalmic solution Place 1 drop into the right eye  4 times daily Start 2 days prior to surgery four times a day, after surgery: Four times a day x 1 week 3 mL 0    mycophenolic acid (GENERIC EQUIVALENT) 360 MG EC tablet Take 2 tablets (720 mg) by mouth every 12 hours 360 tablet 3    oxyCODONE (ROXICODONE) 5 MG tablet Take 1 tablet (5 mg) by mouth 4 times daily as needed for pain maximum 6 tablet(s) per day 30 tablet 0    prednisoLONE acetate (PRED FORTE) 1 % ophthalmic suspension Place 1-2 drops into the right eye 4 times daily after surgery: Four times a day x 1 week, three times a day x 1 week, twice a day x 1 week, daily x 1 week then stop 10 mL 0    predniSONE (DELTASONE) 2.5 MG tablet Take 1 tablet (2.5 mg) by mouth daily 90 tablet 3    sildenafil (REVATIO) 20 MG tablet Take 2 tablets (40 mg) by mouth daily as needed (erectile dysfunction) 10 tablet 11    tacrolimus (GENERIC EQUIVALENT) 1 MG capsule Take 4 capsules (4 mg) by mouth every morning AND 3 capsules (3 mg) every evening. 630 capsule 3    tretinoin (RETIN-A) 0.025 % external cream Apply a pea-sized amount evenly over the face at nighttime before bed 45 g 11    triamcinolone (KENALOG) 0.1 % external ointment Apply topically 2 times daily to the itching on the legs 80 g 1    ursodiol (ACTIGALL) 500 MG tablet Take 1 tablet (500 mg) by mouth 2 times daily 180 tablet 3       ALLERGIES:   Allergies   Allergen Reactions    Erythromycin GI Disturbance    Losartan Other (See Comments)     Consistently develops hyperkalemia    Vioxx      Nausea, vomiting       Lab on 05/23/2024   Component Date Value Ref Range Status    Sodium 05/23/2024 141  135 - 145 mmol/L Final    Reference intervals for this test were updated on 09/26/2023 to more accurately reflect our healthy population. There may be differences in the flagging of prior results with similar values performed with this method. Interpretation of those prior results can be made in the context of the updated reference intervals.     Potassium 05/23/2024 5.0   3.4 - 5.3 mmol/L Final    Chloride 05/23/2024 112 (H)  98 - 107 mmol/L Final    Carbon Dioxide (CO2) 05/23/2024 19 (L)  22 - 29 mmol/L Final    Anion Gap 05/23/2024 10  7 - 15 mmol/L Final    Glucose 05/23/2024 122 (H)  70 - 99 mg/dL Final    Urea Nitrogen 05/23/2024 50.5 (H)  8.0 - 23.0 mg/dL Final    Creatinine 05/23/2024 2.02 (H)  0.67 - 1.17 mg/dL Final    GFR Estimate 05/23/2024 37 (L)  >60 mL/min/1.73m2 Final    Calcium 05/23/2024 8.9  8.6 - 10.0 mg/dL Final    Albumin 05/23/2024 4.0  3.5 - 5.2 g/dL Final    Phosphorus 05/23/2024 3.4  2.5 - 4.5 mg/dL Final    WBC Count 05/23/2024 5.3  4.0 - 11.0 10e3/uL Final    RBC Count 05/23/2024 3.58 (L)  4.40 - 5.90 10e6/uL Final    Hemoglobin 05/23/2024 10.7 (L)  13.3 - 17.7 g/dL Final    Hematocrit 05/23/2024 32.6 (L)  40.0 - 53.0 % Final    MCV 05/23/2024 91  78 - 100 fL Final    MCH 05/23/2024 29.9  26.5 - 33.0 pg Final    MCHC 05/23/2024 32.8  31.5 - 36.5 g/dL Final    RDW 05/23/2024 13.3  10.0 - 15.0 % Final    Platelet Count 05/23/2024 102 (L)  150 - 450 10e3/uL Final    Tacrolimus by Tandem Mass Spectrom* 05/23/2024 2.7 (L)  5.0 - 15.0 ug/L Final    Comment: Tacrolimus Reference Range (ug/L):    Kidney Transplant:  Pediatric  0-3 months post transplant: 10-12  3-6 months post transplant: 8-10  6-12 months post transplant: 6-8  >12 months post transplant: 4-7    Adult  0-6 months post transplant: 8-10  6-12 months post transplant: 6-8  >12 months post transplant: 4-6  >5 years post transplant: 3-5    Heart Transplant:  Pediatric  0-12 months post transplant: 10-15  >12 months post transplant: 5-10    Adult  0-3 months post transplant: 10-15  3-6 months post transplant: 8-12  6-12 months post transplant: 6-12  >12 months post transplant: 6-10    Lung Transplant:  0-12 months post transplant: 10-15  >12 months post transplant: 8-12    Liver Transplant:  Pediatric  0-3 months post transplant: 10-15  3-6 months post transplant: 8-10  6 months-5 years post transplant:  "6-8   >5 years post transplant: 1-3    Adult  0-3 months post transplant: 10-12  3-6 months post transplant: 8-10  >6 months post transplant: 6-8    Pancreas Transplant:  0-6                            months post transplant: 8-10  >6 months post transplant: 5-8    Tacrolimus Last Dose Date 05/23/2024 5/22/2024   Final    Tacrolimus Last Dose Time 05/23/2024  7:00 PM   Final    Alkaline Phosphatase 05/23/2024 155 (H)  40 - 150 U/L Final    ALT 05/23/2024 22  0 - 70 U/L Final    AST 05/23/2024 21  0 - 45 U/L Final    Bilirubin Total 05/23/2024 0.4  <=1.2 mg/dL Final    Bilirubin Direct 05/23/2024 <0.20  0.00 - 0.30 mg/dL Final    Protein Total 05/23/2024 6.4  6.4 - 8.3 g/dL Final       Anti-obesity medication ROS:    HEENT  Hx of glaucoma: No    Cardiovascular  CAD:No  HTN:Yes    Gastrointestinal  GERD:No  Constipation/diarrhea/GI issues:Yes  Liver Dz:Yes  FIB-4 Calculation: 2.59 at 5/23/2024  7:21 AM  Calculated from:  SGOT/AST: 21 U/L at 5/23/2024  7:21 AM  SGPT/ALT: 22 U/L at 5/23/2024  7:21 AM  Platelets: 102 10e3/uL at 5/23/2024  7:21 AM  Age: 58 years    H/O Pancreatitis: prior to liver transplant    Psychiatric  Bipolar: No  Anxiety:No  Depression:No  History of alcohol/drug abuse: No  Hx of eating disorder:No    Endocrine  Personal or family hx of MTC or MEN2:No-    Diabetes/prediabetes: Yes    Neurologic:  Hx of seizures: No  Hx of migraines: No  Memory Impairment: No  Chronic pain/opioids use: Yes      History of kidney stones: No  Kidney disease: Yes        3/1/2024    10:57 AM   REGGIE Score (Last Two)   REGGIE Raw Score 40   Activation Score 100   REGGIE Level 4         PHYSICAL EXAM:  Objective   /70   Pulse 65   Ht 1.803 m (5' 11\")   Wt 131.3 kg (289 lb 6.4 oz)   SpO2 98%   BMI 40.36 kg/m    Physical Exam   GENERAL: alert and no distress  EYES: Eyes grossly normal to inspection.  No discharge or erythema, or obvious scleral/conjunctival abnormalities.  RESP: No audible wheeze, cough, or visible " cyanosis.    SKIN: Visible skin clear. No significant rash, abnormal pigmentation or lesions.  NEURO: Cranial nerves grossly intact.  Mentation and speech appropriate for age.  PSYCH: Appropriate affect, tone, and pace of words     FIB-4 Calculation: 2.59 at 5/23/2024  7:21 AM  Calculated from:  SGOT/AST: 21 U/L at 5/23/2024  7:21 AM  SGPT/ALT: 22 U/L at 5/23/2024  7:21 AM  Platelets: 102 10e3/uL at 5/23/2024  7:21 AM  Age: 58 years    Fib-4 < 1.3: No further evaluation at this point, unless other concerns  - If the Fib-4 is > 2.67,  Fibroscan and elective liver clinic referral  - Intermediate Fib-4 scores: Get a Fibroscan, consider repeating this in 1-2 years.    Sincerely,    Jennifer Vieira NP

## 2024-07-03 NOTE — PATIENT INSTRUCTIONS
"Jon Sauer, it was nice to meet you today!  Thank you for allowing us the privilege of caring for you. We hope we provided you with the excellent service you deserve.   Please let us know if there is anything else we can do for you so that we can be sure you are completely satisfied with your care experience.    To ensure the quality of our services you may be receiving a patient satisfaction survey from an independent patient satisfaction monitoring company.    The greatest compliment you can give is a \"Likely to Recommend\"    Your visit was with Jennifer Vieira NP today.    Instructions per today's visit:     Follow up  plan:  Start mounjaro 2.5mg   Pay attention to adjusting insulin 3-4 weeks after starting mounjaro  Great work with fluids   See dietitian   Follow up with me in 3 months   ___________________________________________________________________________  Important contact and scheduling information:  Please call our contact center at [phone] to schedule your next appointments.  For any nursing questions or concerns call Sonia Guadarrama LPN at [phone] or Sandi Diez RN at [phone]  Please call during clinic hours Monday through Friday 8:00a - 4:00p if you have questions or you can contact us via [employer] at anytime and we will reply during clinic hours.    Lab results will be communicated through My Chart or letter (if My Chart not used). Please call the clinic if you have not received communication after 1 week or if you have any questions.?  Clinic Fax: [phone]  __________________________________________________________________________    If labs were ordered today:    Please make an appointment to have them drawn at your convenience.     To schedule the Lab Appointment using MyChart:  Select \"Schedule an Appointment\"  Select \"Lab Only\"  For \"A couple of questions\", select \"Other\"  For \"Which locations work for you?, select the location and set up the appointment    To schedule by phone " call [phone] to schedule a lab only appointment at any [facility] lab.  ___________________________________________________________________________  Work with A Health !  Virtual Sessions are Available through [facility]    To learn more, call to schedule a free, Health  Q&A appointment: [phone]     What is Health Coaching?  Do you know what you are supposed to do, but you just aren't doing it?  Then, HEALTH COACHING may help you!   Get unstuck and move forward with the support of a professionally trained NBC-HWC (National Board-Certified Health and ) who uses evidence-based approaches to help you move forward with healthy lifestyle changes in the areas of weight loss, stress management and overall well-being.    Health Coaches help you identify goals that will work best for you. Health Coaches provide support and encouragement with overcoming barriers and help you to find inspiration and motivation to lead a healthy lifestyle.    Option one:  Health Coaching 3-Pack; Three, 30-minute Health Coaching Visits, for $99  Visits are done virtually (phone or video)  This is a self pay service; we do not accept insurance for kulwant coaching.    Option two:   The 24 week Plan; 11 Health Coaching Visits, and a 7 months subscription to [employer]-- on-demand fitness, nutrition and mindfulness classes, for $499 (employee discounts may be available). Participants will also meet regularly with a weight management Medical Provider and a Registered/Licensed Dietician.  This is a self-pay service; we do not accept insurance for health coaching.    To Schedule a free Health  Q&A appointment to learn more,  call [phone].  ____________________________________________________________________  [facility]  Healthy Lifestyle Group    Healthy Lifestyle Group  This is a 60 minute virtual coaching group for those who  "want to lead a healthier lifestyle. Come together to set goals and overcome barriers in a supportive group environment. We will address the four pillars of health--nutrition, exercise, sleep and emotional well-being.  This group is highly recommended for those who are participating in the 24 week Healthy Lifestyle Plan and our Health Coaching sessions.    WHEN: This group meets the first Friday of the month, 12:30 PM - 1:30 PM online, via a zoom meeting.      FACILITATOR: Led by National Board Certified Health and , Kathy Villegas [facility].    TO REGISTER: Please call the Call Center at [phone] to register. You will get an appointment to attend in [employer]. Fifteen minutes prior to the meeting, complete the e-check in and you will get the link to join the meeting.  There is no charge to attend this group and space is limited.      2023 and 2024 Meeting Topics and Dates:    November 3: Introduction to Mindfulness (Learn simple and effective mindfulness practices and how it can benefit you)    December 8: Let's Talk (guided discussion on our wins and challenges)    January 5: New Years Vision: Manifest your Best 2024! (Guided imagery,  journaling and discussion)    February 2: Let's Talk    March 1: 10 Percent Happier by Raad Candelaria (Book Bites; a guided discussion on the nuggets of wisdom from favorite wellness books; no need to read the book but highly encouraged)    April 5: Let's Talk    May 3: \"Essentialism; The Disciplined Pursuit of Less by Andrea Salamanca (book bites discussion)    June 7: Let's Talk    July 5: NO MEETING, off for the 4th of July Holiday    August 2: The Blue Zones, Secrets for Living a Longer Life by Raad Bowser (book bites discussion)      If you would like bariatric surgery specific support group info please let your care team know.         Thank you,   [facility] Comprehensive Weight Management Team                         "

## 2024-07-05 ENCOUNTER — LAB (OUTPATIENT)
Dept: LAB | Facility: CLINIC | Age: 59
End: 2024-07-05
Payer: COMMERCIAL

## 2024-07-05 DIAGNOSIS — E11.21 CONTROLLED TYPE 2 DIABETES MELLITUS WITH DIABETIC NEPHROPATHY, UNSPECIFIED WHETHER LONG TERM INSULIN USE (H): ICD-10-CM

## 2024-07-05 DIAGNOSIS — N18.31 STAGE 3A CHRONIC KIDNEY DISEASE (H): ICD-10-CM

## 2024-07-05 LAB
ALBUMIN MFR UR ELPH: 11.4 MG/DL
ALBUMIN SERPL BCG-MCNC: 4.1 G/DL (ref 3.5–5.2)
ANION GAP SERPL CALCULATED.3IONS-SCNC: 12 MMOL/L (ref 7–15)
BUN SERPL-MCNC: 51.8 MG/DL (ref 8–23)
CALCIUM SERPL-MCNC: 8.5 MG/DL (ref 8.6–10)
CHLORIDE SERPL-SCNC: 103 MMOL/L (ref 98–107)
CREAT SERPL-MCNC: 2.2 MG/DL (ref 0.67–1.17)
CREAT UR-MCNC: 105 MG/DL
DEPRECATED HCO3 PLAS-SCNC: 18 MMOL/L (ref 22–29)
EGFRCR SERPLBLD CKD-EPI 2021: 34 ML/MIN/1.73M2
GLUCOSE SERPL-MCNC: 208 MG/DL (ref 70–99)
HBA1C MFR BLD: 6.2 %
HGB BLD-MCNC: 10.8 G/DL (ref 13.3–17.7)
PHOSPHATE SERPL-MCNC: 4 MG/DL (ref 2.5–4.5)
POTASSIUM SERPL-SCNC: 4.5 MMOL/L (ref 3.4–5.3)
PROT/CREAT 24H UR: 0.11 MG/MG CR (ref 0–0.2)
SODIUM SERPL-SCNC: 133 MMOL/L (ref 135–145)

## 2024-07-05 PROCEDURE — 83036 HEMOGLOBIN GLYCOSYLATED A1C: CPT

## 2024-07-05 PROCEDURE — 36415 COLL VENOUS BLD VENIPUNCTURE: CPT | Performed by: PATHOLOGY

## 2024-07-05 PROCEDURE — 84156 ASSAY OF PROTEIN URINE: CPT | Performed by: PATHOLOGY

## 2024-07-05 PROCEDURE — 99000 SPECIMEN HANDLING OFFICE-LAB: CPT | Performed by: PATHOLOGY

## 2024-07-05 PROCEDURE — 85018 HEMOGLOBIN: CPT | Performed by: PATHOLOGY

## 2024-07-05 PROCEDURE — 80069 RENAL FUNCTION PANEL: CPT | Performed by: PATHOLOGY

## 2024-07-08 ENCOUNTER — LAB (OUTPATIENT)
Dept: LAB | Facility: CLINIC | Age: 59
End: 2024-07-08
Payer: COMMERCIAL

## 2024-07-08 DIAGNOSIS — N18.31 STAGE 3A CHRONIC KIDNEY DISEASE (H): ICD-10-CM

## 2024-07-08 DIAGNOSIS — Z94.4 LIVER REPLACED BY TRANSPLANT (H): ICD-10-CM

## 2024-07-08 LAB
ALBUMIN SERPL BCG-MCNC: 4.2 G/DL (ref 3.5–5.2)
ALP SERPL-CCNC: 155 U/L (ref 40–150)
ALT SERPL W P-5'-P-CCNC: 20 U/L (ref 0–70)
AST SERPL W P-5'-P-CCNC: 27 U/L (ref 0–45)
BILIRUB DIRECT SERPL-MCNC: <0.2 MG/DL (ref 0–0.3)
BILIRUB SERPL-MCNC: 0.3 MG/DL
ERYTHROCYTE [DISTWIDTH] IN BLOOD BY AUTOMATED COUNT: 13.5 % (ref 10–15)
HCT VFR BLD AUTO: 33.9 % (ref 40–53)
HGB BLD-MCNC: 11.4 G/DL (ref 13.3–17.7)
MCH RBC QN AUTO: 31 PG (ref 26.5–33)
MCHC RBC AUTO-ENTMCNC: 33.6 G/DL (ref 31.5–36.5)
MCV RBC AUTO: 92 FL (ref 78–100)
PLATELET # BLD AUTO: 119 10E3/UL (ref 150–450)
PROT SERPL-MCNC: 6.7 G/DL (ref 6.4–8.3)
RBC # BLD AUTO: 3.68 10E6/UL (ref 4.4–5.9)
TACROLIMUS BLD-MCNC: 4.7 UG/L (ref 5–15)
TME LAST DOSE: ABNORMAL H
TME LAST DOSE: ABNORMAL H
WBC # BLD AUTO: 6.1 10E3/UL (ref 4–11)

## 2024-07-08 PROCEDURE — 80197 ASSAY OF TACROLIMUS: CPT | Performed by: INTERNAL MEDICINE

## 2024-07-08 PROCEDURE — 85027 COMPLETE CBC AUTOMATED: CPT | Performed by: PATHOLOGY

## 2024-07-08 PROCEDURE — 80053 COMPREHEN METABOLIC PANEL: CPT | Performed by: PATHOLOGY

## 2024-07-08 PROCEDURE — 99000 SPECIMEN HANDLING OFFICE-LAB: CPT | Performed by: PATHOLOGY

## 2024-07-08 PROCEDURE — 84100 ASSAY OF PHOSPHORUS: CPT | Performed by: PATHOLOGY

## 2024-07-08 PROCEDURE — 82248 BILIRUBIN DIRECT: CPT | Performed by: PATHOLOGY

## 2024-07-08 PROCEDURE — 36415 COLL VENOUS BLD VENIPUNCTURE: CPT | Performed by: PATHOLOGY

## 2024-07-10 ENCOUNTER — OFFICE VISIT (OUTPATIENT)
Dept: NEPHROLOGY | Facility: CLINIC | Age: 59
End: 2024-07-10
Payer: COMMERCIAL

## 2024-07-10 VITALS
DIASTOLIC BLOOD PRESSURE: 75 MMHG | SYSTOLIC BLOOD PRESSURE: 123 MMHG | HEART RATE: 62 BPM | HEIGHT: 72 IN | BODY MASS INDEX: 38.6 KG/M2 | OXYGEN SATURATION: 96 % | WEIGHT: 285 LBS

## 2024-07-10 DIAGNOSIS — E87.5 HYPERKALEMIA: ICD-10-CM

## 2024-07-10 DIAGNOSIS — I10 BENIGN ESSENTIAL HYPERTENSION: ICD-10-CM

## 2024-07-10 DIAGNOSIS — N18.32 STAGE 3B CHRONIC KIDNEY DISEASE (H): Primary | ICD-10-CM

## 2024-07-10 DIAGNOSIS — E11.21 CONTROLLED TYPE 2 DIABETES MELLITUS WITH DIABETIC NEPHROPATHY, UNSPECIFIED WHETHER LONG TERM INSULIN USE (H): ICD-10-CM

## 2024-07-10 DIAGNOSIS — D63.1 ANEMIA IN STAGE 3B CHRONIC KIDNEY DISEASE (H): ICD-10-CM

## 2024-07-10 DIAGNOSIS — R80.1 PERSISTENT PROTEINURIA: ICD-10-CM

## 2024-07-10 DIAGNOSIS — E55.9 VITAMIN D DEFICIENCY: ICD-10-CM

## 2024-07-10 DIAGNOSIS — N18.31 STAGE 3A CHRONIC KIDNEY DISEASE (H): ICD-10-CM

## 2024-07-10 DIAGNOSIS — N18.32 ANEMIA IN STAGE 3B CHRONIC KIDNEY DISEASE (H): ICD-10-CM

## 2024-07-10 LAB
ALBUMIN SERPL BCG-MCNC: 4.2 G/DL (ref 3.5–5.2)
ANION GAP SERPL CALCULATED.3IONS-SCNC: 16 MMOL/L (ref 7–15)
BUN SERPL-MCNC: 45.2 MG/DL (ref 8–23)
CALCIUM SERPL-MCNC: 8.9 MG/DL (ref 8.6–10)
CHLORIDE SERPL-SCNC: 106 MMOL/L (ref 98–107)
CREAT SERPL-MCNC: 2.18 MG/DL (ref 0.67–1.17)
DEPRECATED HCO3 PLAS-SCNC: 19 MMOL/L (ref 22–29)
EGFRCR SERPLBLD CKD-EPI 2021: 34 ML/MIN/1.73M2
GLUCOSE SERPL-MCNC: 129 MG/DL (ref 70–99)
PHOSPHATE SERPL-MCNC: 4.8 MG/DL (ref 2.5–4.5)
POTASSIUM SERPL-SCNC: 4.7 MMOL/L (ref 3.4–5.3)
SODIUM SERPL-SCNC: 141 MMOL/L (ref 135–145)

## 2024-07-10 PROCEDURE — 99214 OFFICE O/P EST MOD 30 MIN: CPT

## 2024-07-10 PROCEDURE — 99213 OFFICE O/P EST LOW 20 MIN: CPT

## 2024-07-10 RX ORDER — FUROSEMIDE 40 MG
40 TABLET ORAL DAILY
Qty: 90 TABLET | Refills: 3 | Status: SHIPPED | OUTPATIENT
Start: 2024-07-10

## 2024-07-10 ASSESSMENT — PAIN SCALES - GENERAL: PAINLEVEL: SEVERE PAIN (7)

## 2024-07-10 NOTE — NURSING NOTE
Chief Complaint   Patient presents with    RECHECK     6 month follow up.      Vitals:    07/10/24 0801 07/10/24 0805 07/10/24 0807 07/10/24 0808   BP: 124/76 121/74 122/74 123/75   BP Location: Right arm Right arm Right arm    Patient Position: Sitting Sitting Sitting    Cuff Size: Adult Large Adult Large Adult Large    Pulse: 62      SpO2: 96%      Weight: 129.3 kg (285 lb)      Height:  1.829 m (6')         BP Readings from Last 3 Encounters:   07/10/24 123/75   07/03/24 137/70   03/04/24 (!) 174/83       /75   Pulse 62   Ht 1.829 m (6')   Wt 129.3 kg (285 lb)   SpO2 96%   BMI 38.65 kg/m       Mee Reyes

## 2024-07-10 NOTE — LETTER
7/10/2024       RE: Camacho Bhagat  [address]  [place] [address]     Dear Colleague,    Thank you for referring your patient, Camacho Bhagat, to the [facility] [place] at [facility]. Please see a copy of my visit note below.    Nephrology Clinic Visit 7/10/24    Assessment and Plan:       1. CKDb w/proteinuria - Creat has upticked in the setting of addition of SGLT2, but proteinuria has resolved. Creat 2.1, eGFR 34 ml/mn.  UPCR down to 0.1 mg/mg Cr today from 0.7, 1.3, 2.2 g/g/Cr following addition of Losartan, Jardiance 1/24 and improved b/p control.  Blood pressures controlled   Baseline now ~ 2.0 since 3/24     Etiology of CKD likely multifactorial; DM, recurrent EJ, CNI.    - Had been intolerant of ACE/ARB previously given problems with hyperkalemia assoc with Tacrolimus, but retrial of ACE/ARB when TAC level was <0.3 did not result in Hyperkalemia. In fact Valtessa had been discontinued because he was becoming hypokalemic.    - Patient was restarted on ARB in 4/18 for unexplained nephrotic range proteinuria following ostomy takedown with improvement in UPCR, but then developed mild acute rejection and Tac dose was increased resulting in hyperkalemia. He was restarted on Valtassa with improvement in hyperkalemia and Losartan was discontinued again in 3/19   - We restarted Losartan 25 mg every day 5/22. Unable to increase 2/2 hyperkalemia   - Does not use NSAIDs.    - DM well controlled with A1c 6.2% ( 7/24)      2. HTN/ - Controlled w/o edema. Clinic b/ps 121-124/76. HR 62. Weight down following increase in diuretic therapy.      Current antihypertensive regimen:      Lasix 60 mg qd     Amlodipine 10 mg qd      Toprol  mg every day     Losartan 25 mg every day ( unable to increase given borderline K)     Doxazosin 10 mg HS       - Will decrease Lasix to 40 mg qd       - Strongly encouraged weight loss        3.  Hyperkalemia assoc with Tac/ARB - No acute concerns. K 4.7, Na 141. Hyperkalemia thought to be assoc with ARB/TAC/Daria/acidosis. .    - Hasn't been taking Veltassa for over one year. He's done pretty well with K in the upper 4 to low 5 range over this year   - Continue dietary Potassium modification      4. Volume status - Improved volume status. No edema/dyspnea. Weight 285 # from 292.2 #, 290#, 287#, 284#, 250#. Was 242# in 12/20.  Blood pressures controlled   - Decrease Lasix to 40 mg qd   - Continue Jardiance   - Work on weight loss      5. Acid base - No acute concerns. Bicarb 19      6. BMD - Ca 8.9, Phos 4.8, albumin 4.2   - Vitamin D 43 ( 1/24)   - PTH 65 (3/24).   - Continue Vit D 1000 U qd      7.Anemia -Hgb 11.4    - Iron studies normal 5/22.  Recheck next visit   - Had colonoscopy 10/22    - Not on iron and has not needed DIPAK      8. Liver transplant IS: Tacrolimus, MMF, Pred. Tac level 4.7   - Per transplant   - Last seen by Dr Camejo 3/4/24    9. DM2- Well controlled on Insulin pump. A1c 6.2%.    - Started Jardiance for DM/proteinuria 1/24. Tolerating well   - Has not tolerated Victoza in the past due to worsening diarrhea but trying Tirzepatide. Hopefully he will tolerate to help with weight loss      10. Dispo- RTC 6 months for follow up with labs prior.       Reason for Visit:  CKD3b      HPI:  Mr Bhagat is a 59 yo male with CKD3b, HTN, Chronic hyperkalemia, DM2, Liver transplant, present today for CKD/HTN followup.   Last seen in clinic by me on 1/10/24   At that visit Doxazosin increased to 10 mg HS, Lasix increased to 60 mg bid and Jardiance 10 mg every day started.   Baseline creat low to mid 1's.       Interval Hx:     No hospital admissions.       ROS:  Camacho has no renal concerns  Was prescribed Tirzepatide on 7/3. Hasn't started yet. Does have daily chronic diarrhea so will have to be monitored closely   Self decreased Lasix to 60 mg every day from bid w/o redevelopment of edema. B/ps at  "goal  Finding it hard to exercise outside of his work day given long hours with commute and chronic foot/back pain.   Using his CPAP   DM well controlled with recent A1c 6.2%     Appetite good  Energy level low. Reports that he is \"extremely fatigued\"  Denies dyspnea,  CP, abdominal pain or edema. Voiding w/o difficulty.       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 Former smoker,   by profession, now [employer]. Exercise is limited due to hip/knee pain.   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    Thyroid Disease Mother         Thyroid cancer    Diabetes Father     Substance Abuse Father     Cervical Cancer Maternal Grandmother     Cerebrovascular Disease Maternal Grandfather     No Known Problems Paternal Grandmother     Prostate Cancer Paternal Grandfather     Colon Cancer No family hx of     Hyperlipidemia No family hx of     Coronary Artery Disease No family hx of     Breast Cancer No family hx of     Glaucoma No family hx of     Hypertension No family hx of     Macular Degeneration No family hx of        Allergies:  Allergies   Allergen Reactions    Erythromycin GI Disturbance    Losartan Other (See Comments)     Consistently develops hyperkalemia    Vioxx      Nausea, vomiting       Medications:  Current Outpatient Medications   Medication Sig Dispense Refill    alcohol swab prep pads Use to swab area of injection/francisca as directed. 100 each 3    alpha-lipoic acid 600 MG capsule Take 600 mg by mouth      amLODIPine " (NORVASC) 10 MG tablet Take 1 tablet (10 mg) by mouth daily 90 tablet 3    augmented betamethasone dipropionate (DIPROLENE-AF) 0.05 % external ointment Apply twice daily as needed for rash on the legs 45 g 11    cholecalciferol (VITAMIN D3) 1000 UNIT tablet Take 1 tablet (1,000 Units) by mouth daily (Patient taking differently: Take 1,000 Units by mouth every morning) 100 tablet 3    Continuous Blood Gluc Sensor (DEXCOM G6 SENSOR) MISC Change every 10 days. 9 each 3    Continuous Blood Gluc Transmit (DEXCOM G6 TRANSMITTER) MISC Change every 3 months. 1 each 3    cyclobenzaprine (FLEXERIL) 10 MG tablet Take 1 tablet (10 mg) by mouth 3 times daily as needed for muscle spasms 90 tablet 3    doxazosin (CARDURA) 8 MG tablet Take 1 tablet (8 mg) by mouth at bedtime Take a total of 10 mg at bedtime 90 tablet 3    empagliflozin (JARDIANCE) 10 MG TABS tablet Take 1 tablet (10 mg) by mouth daily 90 tablet 1    fluticasone (FLONASE) 50 MCG/ACT nasal spray Spray 1 spray into both nostrils daily 20 mL 3    furosemide (LASIX) 40 MG tablet Take 1 tablet (40 mg) by mouth daily . 90 tablet 3    gabapentin (NEURONTIN) 800 MG tablet Take 1 tablet (800 mg) by mouth 3 times daily 90 tablet 11    hydrOXYzine (ATARAX) 25 MG tablet Take 1 tablet (25 mg) by mouth 3 times daily as needed for itching 90 tablet 3    Insulin Disposable Pump (OMNIPOD 5 G6 INTRO, GEN 5,) KIT 1 kit daily 1 kit 0    Insulin Disposable Pump (OMNIPOD 5 G6 POD, GEN 5,) MISC 1 each See Admin Instructions Change every 2 days. Use for insulin administration. 45 each 3    Insulin Disposable Pump (OMNIPOD 5 G6 POD, GEN 5,) MISC 1 each See Admin Instructions Use per 's instructions. 1 each 1    Insulin Lispro (HUMALOG KWIKPEN) 200 UNIT/ML soln To be used in the insulin pump. Total daily dose up to 170 U. 72 mL 3    insulin pen needle (BD ENE U/F) 32G X 4 MM miscellaneous Use 1 pen needle 4 times daily or as directed. 400 each 1    ketorolac (ACULAR) 0.5 %  ophthalmic solution Place 1 drop into the right eye 4 times daily Start 2 days prior to surgery four times a day, after surgery: Four times a day x 1 week, three times a day x 1 week, twice a day x 1 week, daily x 1 week then stop 10 mL 0    lidocaine (LIDODERM) 5 % patch Place 1 patch onto the skin every 24 hours To prevent lidocaine toxicity, patient should be patch free for 12 hrs daily. 30 patch 11    lidocaine (XYLOCAINE) 5 % external ointment Apply topically as needed for moderate pain 90 g 11    losartan (COZAAR) 25 MG tablet Take 1 tablet (25 mg) by mouth daily 90 tablet 3    methocarbamol (ROBAXIN) 500 MG tablet Take 1 tablet (500 mg) by mouth 2 times daily as needed for muscle spasms 60 tablet 0    metoprolol succinate ER (TOPROL XL) 200 MG 24 hr tablet Take 1 tablet (200 mg) by mouth daily 90 tablet 3    moxifloxacin (VIGAMOX) 0.5 % ophthalmic solution Place 1 drop into the right eye 4 times daily Start 2 days prior to surgery four times a day, after surgery: Four times a day x 1 week 3 mL 0    mycophenolic acid (GENERIC EQUIVALENT) 360 MG EC tablet Take 2 tablets (720 mg) by mouth every 12 hours 360 tablet 3    oxyCODONE (ROXICODONE) 5 MG tablet Take 1 tablet (5 mg) by mouth 4 times daily as needed for pain maximum 6 tablet(s) per day 30 tablet 0    prednisoLONE acetate (PRED FORTE) 1 % ophthalmic suspension Place 1-2 drops into the right eye 4 times daily after surgery: Four times a day x 1 week, three times a day x 1 week, twice a day x 1 week, daily x 1 week then stop 10 mL 0    predniSONE (DELTASONE) 2.5 MG tablet Take 1 tablet (2.5 mg) by mouth daily 90 tablet 3    sildenafil (REVATIO) 20 MG tablet Take 2 tablets (40 mg) by mouth daily as needed (erectile dysfunction) 10 tablet 11    tacrolimus (GENERIC EQUIVALENT) 1 MG capsule Take 4 capsules (4 mg) by mouth every morning AND 3 capsules (3 mg) every evening. 630 capsule 3    tirzepatide (MOUNJARO) 2.5 MG/0.5ML pen Inject 2.5 mg Subcutaneous every 7  days 2 mL 1    tretinoin (RETIN-A) 0.025 % external cream Apply a pea-sized amount evenly over the face at nighttime before bed 45 g 11    triamcinolone (KENALOG) 0.1 % external ointment Apply topically 2 times daily to the itching on the legs 80 g 1    ursodiol (ACTIGALL) 500 MG tablet Take 1 tablet (500 mg) by mouth 2 times daily 180 tablet 3     No current facility-administered medications for this visit.      Vitals:  B/ps 121-124/76   HR 62  Weight 285 #    Exam:  GEN: Pleasant male in NAD  CARDIAC: RRR  LUNGS: CTA  ABDOMEN: Obese  EXT: no LE edema  NEURO: A/O    LABS:   CMP  Recent Labs   Lab Test 07/08/24  1647 07/05/24  1703 05/23/24  0721 03/01/24  1712 08/18/21  1236 06/08/21  1628 05/27/21  1729 03/31/21  1145 02/12/21  1331    133* 141 137   < > 134 131* 138 138   POTASSIUM 4.7 4.5 5.0 5.4*   < > 4.9 5.1 4.5 5.8*   CHLORIDE 106 103 112* 107   < > 105 102 109 111*   CO2 19* 18* 19* 20*   < > 23 23 22 22   ANIONGAP 16* 12 10 10   < > 6 6 7 5   * 208* 122* 101*   < > 249* 335* 134* 251*   BUN 45.2* 51.8* 50.5* 49.4*   < > 43* 50* 54* 44*   CR 2.18* 2.20* 2.02* 1.93*   < > 1.51* 1.53* 1.56* 1.32*   GFRESTIMATED 34* 34* 37* 39*   < > 51* 50* 49* 60*   GFRESTBLACK  --   --   --   --   --  59* 58* 57* 69   JACOB 8.9 8.5* 8.9 8.9   < > 8.9 9.1 8.9 8.7    < > = values in this interval not displayed.     Recent Labs   Lab Test 07/08/24  1647 05/23/24  0721 03/01/24  1712 01/09/24  1714   BILITOTAL 0.3 0.4 0.3 0.5   ALKPHOS 155* 155* 144 162*   ALT 20 22 23 21   AST 27 21 24 26     CBC  Recent Labs   Lab Test 07/08/24  1647 07/05/24  1703 05/23/24  0721 03/01/24  1712 01/09/24  1714   HGB 11.4* 10.8* 10.7* 10.4* 11.9*   WBC 6.1  --  5.3 4.6 7.2   RBC 3.68*  --  3.58* 3.37* 3.88*   HCT 33.9*  --  32.6* 31.0* 34.5*   MCV 92  --  91 92 89   MCH 31.0  --  29.9 30.9 30.7   MCHC 33.6  --  32.8 33.5 34.5   RDW 13.5  --  13.3 13.2 13.3   *  --  102* 98* 108*     URINE STUDIES  Recent Labs   Lab Test  01/09/24 2010 01/30/23  0750 05/24/22  1035 11/24/20  1325   COLOR Light Yellow Light Yellow Light Yellow Yellow   APPEARANCE Clear Clear Clear Clear   URINEGLC Negative Negative Negative Negative   URINEBILI Negative Negative Negative Negative   URINEKETONE Negative Negative Negative Negative   SG 1.010 1.013 1.014 1.014   UBLD Negative Negative Trace* Negative   URINEPH 5.5 5.0 5.5 5.0   PROTEIN 50* 20* 100* 20*   NITRITE Negative Negative Negative Negative   LEUKEST Negative Negative Negative Negative   RBCU 1 1 1 1   WBCU <1 <1 1 <1     Recent Labs   Lab Test 01/30/23  0750 09/02/22  1143 05/24/22  1035 03/03/22  1145   UTPG 0.34* 1.35* 2.26* 1.58*     PTH  Recent Labs   Lab Test 03/01/24  1712 09/27/22  0841 12/01/21  1359   PTHI 65 31 28     IRON STUDIES  Recent Labs   Lab Test 09/27/22  0841 05/27/21  1729 11/24/20  1345   IRON 109 69 105    267 249   IRONSAT 44 26 42   NELY 624* 935* 1,104*       Cinda Cazares, JUANITO

## 2024-07-11 NOTE — PROGRESS NOTES
Nephrology Clinic Visit 7/10/24    Assessment and Plan:       1. CKDb w/proteinuria - Creat has upticked in the setting of addition of SGLT2, but proteinuria has resolved. Creat 2.1, eGFR 34 ml/mn.  UPCR down to 0.1 mg/mg Cr today from 0.7, 1.3, 2.2 g/g/Cr following addition of Losartan, Jardiance 1/24 and improved b/p control.  Blood pressures controlled   Baseline now ~ 2.0 since 3/24     Etiology of CKD likely multifactorial; DM, recurrent EJ, CNI.    - Had been intolerant of ACE/ARB previously given problems with hyperkalemia assoc with Tacrolimus, but retrial of ACE/ARB when TAC level was <0.3 did not result in Hyperkalemia. In fact Valtessa had been discontinued because he was becoming hypokalemic.    - Patient was restarted on ARB in 4/18 for unexplained nephrotic range proteinuria following ostomy takedown with improvement in UPCR, but then developed mild acute rejection and Tac dose was increased resulting in hyperkalemia. He was restarted on Valtassa with improvement in hyperkalemia and Losartan was discontinued again in 3/19   - We restarted Losartan 25 mg every day 5/22. Unable to increase 2/2 hyperkalemia   - Does not use NSAIDs.    - DM well controlled with A1c 6.2% ( 7/24)      2. HTN/ - Controlled w/o edema. Clinic b/ps 121-124/76. HR 62. Weight down following increase in diuretic therapy.      Current antihypertensive regimen:      Lasix 60 mg qd     Amlodipine 10 mg qd      Toprol  mg every day     Losartan 25 mg every day ( unable to increase given borderline K)     Doxazosin 10 mg HS       - Will decrease Lasix to 40 mg qd       - Strongly encouraged weight loss        3. Hyperkalemia assoc with Tac/ARB - No acute concerns. K 4.7, Na 141. Hyperkalemia thought to be assoc with ARB/TAC/Daria/acidosis. .    - Hasn't been taking Veltassa for over one year. He's done pretty well with K in the upper 4 to low 5 range over this year   - Continue dietary Potassium modification      4. Volume status -  "Improved volume status. No edema/dyspnea. Weight 285 # from 292.2 #, 290#, 287#, 284#, 250#. Was 242# in 12/20.  Blood pressures controlled   - Decrease Lasix to 40 mg qd   - Continue Jardiance   - Work on weight loss      5. Acid base - No acute concerns. Bicarb 19      6. BMD - Ca 8.9, Phos 4.8, albumin 4.2   - Vitamin D 43 ( 1/24)   - PTH 65 (3/24).   - Continue Vit D 1000 U qd      7.Anemia -Hgb 11.4    - Iron studies normal 5/22.  Recheck next visit   - Had colonoscopy 10/22    - Not on iron and has not needed DIPAK      8. Liver transplant IS: Tacrolimus, MMF, Pred. Tac level 4.7   - Per transplant   - Last seen by Dr Camejo 3/4/24    9. DM2- Well controlled on Insulin pump. A1c 6.2%.    - Started Jardiance for DM/proteinuria 1/24. Tolerating well   - Has not tolerated Victoza in the past due to worsening diarrhea but trying Tirzepatide. Hopefully he will tolerate to help with weight loss      10. Dispo- RTC 6 months for follow up with labs prior.       Reason for Visit:  CKD3b      HPI:  Mr Bhagat is a 57 yo male with CKD3b, HTN, Chronic hyperkalemia, DM2, Liver transplant, present today for CKD/HTN followup.   Last seen in clinic by me on 1/10/24   At that visit Doxazosin increased to 10 mg HS, Lasix increased to 60 mg bid and Jardiance 10 mg every day started.   Baseline creat low to mid 1's.       Interval Hx:     No hospital admissions.       ROS:  Camacho has no renal concerns  Was prescribed Tirzepatide on 7/3. Hasn't started yet. Does have daily chronic diarrhea so will have to be monitored closely   Self decreased Lasix to 60 mg every day from bid w/o redevelopment of edema. B/ps at goal  Finding it hard to exercise outside of his work day given long hours with commute and chronic foot/back pain.   Using his CPAP   DM well controlled with recent A1c 6.2%     Appetite good  Energy level low. Reports that he is \"extremely fatigued\"  Denies dyspnea,  CP, abdominal pain or edema. Voiding w/o difficulty.     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 Former smoker,   by profession, now [facility]. Exercise is limited due to hip/knee pain.   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    Thyroid Disease Mother         Thyroid cancer    Diabetes Father     Substance Abuse Father     Cervical Cancer Maternal Grandmother     Cerebrovascular Disease Maternal Grandfather     No Known Problems Paternal Grandmother     Prostate Cancer Paternal Grandfather     Colon Cancer No family hx of     Hyperlipidemia No family hx of     Coronary Artery Disease No family hx of     Breast Cancer No family hx of     Glaucoma No family hx of     Hypertension No family hx of     Macular Degeneration No family hx of        Allergies:  Allergies   Allergen Reactions    Erythromycin GI Disturbance    Losartan Other (See Comments)     Consistently develops hyperkalemia    Vioxx      Nausea, vomiting       Medications:  Current Outpatient Medications   Medication Sig Dispense Refill    alcohol swab prep pads Use to swab area of injection/francisca as directed. 100 each 3    alpha-lipoic acid 600 MG capsule Take 600 mg by mouth      amLODIPine (NORVASC) 10 MG tablet Take 1 tablet (10 mg) by mouth daily 90 tablet 3    augmented betamethasone dipropionate (DIPROLENE-AF) 0.05 % external ointment Apply twice daily as needed for rash on the legs 45 g 11    cholecalciferol (VITAMIN D3) 1000 UNIT tablet Take 1 tablet (1,000 Units) by mouth daily (Patient taking  differently: Take 1,000 Units by mouth every morning) 100 tablet 3    Continuous Blood Gluc Sensor (DEXCOM G6 SENSOR) MISC Change every 10 days. 9 each 3    Continuous Blood Gluc Transmit (DEXCOM G6 TRANSMITTER) MISC Change every 3 months. 1 each 3    cyclobenzaprine (FLEXERIL) 10 MG tablet Take 1 tablet (10 mg) by mouth 3 times daily as needed for muscle spasms 90 tablet 3    doxazosin (CARDURA) 8 MG tablet Take 1 tablet (8 mg) by mouth at bedtime Take a total of 10 mg at bedtime 90 tablet 3    empagliflozin (JARDIANCE) 10 MG TABS tablet Take 1 tablet (10 mg) by mouth daily 90 tablet 1    fluticasone (FLONASE) 50 MCG/ACT nasal spray Spray 1 spray into both nostrils daily 20 mL 3    furosemide (LASIX) 40 MG tablet Take 1 tablet (40 mg) by mouth daily . 90 tablet 3    gabapentin (NEURONTIN) 800 MG tablet Take 1 tablet (800 mg) by mouth 3 times daily 90 tablet 11    hydrOXYzine (ATARAX) 25 MG tablet Take 1 tablet (25 mg) by mouth 3 times daily as needed for itching 90 tablet 3    Insulin Disposable Pump (OMNIPOD 5 G6 INTRO, GEN 5,) KIT 1 kit daily 1 kit 0    Insulin Disposable Pump (OMNIPOD 5 G6 POD, GEN 5,) MISC 1 each See Admin Instructions Change every 2 days. Use for insulin administration. 45 each 3    Insulin Disposable Pump (OMNIPOD 5 G6 POD, GEN 5,) MISC 1 each See Admin Instructions Use per 's instructions. 1 each 1    Insulin Lispro (HUMALOG KWIKPEN) 200 UNIT/ML soln To be used in the insulin pump. Total daily dose up to 170 U. 72 mL 3    insulin pen needle (BD ENE U/F) 32G X 4 MM miscellaneous Use 1 pen needle 4 times daily or as directed. 400 each 1    ketorolac (ACULAR) 0.5 % ophthalmic solution Place 1 drop into the right eye 4 times daily Start 2 days prior to surgery four times a day, after surgery: Four times a day x 1 week, three times a day x 1 week, twice a day x 1 week, daily x 1 week then stop 10 mL 0    lidocaine (LIDODERM) 5 % patch Place 1 patch onto the skin every 24 hours To  prevent lidocaine toxicity, patient should be patch free for 12 hrs daily. 30 patch 11    lidocaine (XYLOCAINE) 5 % external ointment Apply topically as needed for moderate pain 90 g 11    losartan (COZAAR) 25 MG tablet Take 1 tablet (25 mg) by mouth daily 90 tablet 3    methocarbamol (ROBAXIN) 500 MG tablet Take 1 tablet (500 mg) by mouth 2 times daily as needed for muscle spasms 60 tablet 0    metoprolol succinate ER (TOPROL XL) 200 MG 24 hr tablet Take 1 tablet (200 mg) by mouth daily 90 tablet 3    moxifloxacin (VIGAMOX) 0.5 % ophthalmic solution Place 1 drop into the right eye 4 times daily Start 2 days prior to surgery four times a day, after surgery: Four times a day x 1 week 3 mL 0    mycophenolic acid (GENERIC EQUIVALENT) 360 MG EC tablet Take 2 tablets (720 mg) by mouth every 12 hours 360 tablet 3    oxyCODONE (ROXICODONE) 5 MG tablet Take 1 tablet (5 mg) by mouth 4 times daily as needed for pain maximum 6 tablet(s) per day 30 tablet 0    prednisoLONE acetate (PRED FORTE) 1 % ophthalmic suspension Place 1-2 drops into the right eye 4 times daily after surgery: Four times a day x 1 week, three times a day x 1 week, twice a day x 1 week, daily x 1 week then stop 10 mL 0    predniSONE (DELTASONE) 2.5 MG tablet Take 1 tablet (2.5 mg) by mouth daily 90 tablet 3    sildenafil (REVATIO) 20 MG tablet Take 2 tablets (40 mg) by mouth daily as needed (erectile dysfunction) 10 tablet 11    tacrolimus (GENERIC EQUIVALENT) 1 MG capsule Take 4 capsules (4 mg) by mouth every morning AND 3 capsules (3 mg) every evening. 630 capsule 3    tirzepatide (MOUNJARO) 2.5 MG/0.5ML pen Inject 2.5 mg Subcutaneous every 7 days 2 mL 1    tretinoin (RETIN-A) 0.025 % external cream Apply a pea-sized amount evenly over the face at nighttime before bed 45 g 11    triamcinolone (KENALOG) 0.1 % external ointment Apply topically 2 times daily to the itching on the legs 80 g 1    ursodiol (ACTIGALL) 500 MG tablet Take 1 tablet (500 mg) by  mouth 2 times daily 180 tablet 3     No current facility-administered medications for this visit.      Vitals:  B/ps 121-124/76   HR 62  Weight 285 #    Exam:  GEN: Pleasant male in NAD  CARDIAC: RRR  LUNGS: CTA  ABDOMEN: Obese  EXT: no LE edema  NEURO: A/O    LABS:   CMP  Recent Labs   Lab Test 07/08/24  1647 07/05/24  1703 05/23/24  0721 03/01/24  1712 08/18/21  1236 06/08/21  1628 05/27/21  1729 03/31/21  1145 02/12/21  1331    133* 141 137   < > 134 131* 138 138   POTASSIUM 4.7 4.5 5.0 5.4*   < > 4.9 5.1 4.5 5.8*   CHLORIDE 106 103 112* 107   < > 105 102 109 111*   CO2 19* 18* 19* 20*   < > 23 23 22 22   ANIONGAP 16* 12 10 10   < > 6 6 7 5   * 208* 122* 101*   < > 249* 335* 134* 251*   BUN 45.2* 51.8* 50.5* 49.4*   < > 43* 50* 54* 44*   CR 2.18* 2.20* 2.02* 1.93*   < > 1.51* 1.53* 1.56* 1.32*   GFRESTIMATED 34* 34* 37* 39*   < > 51* 50* 49* 60*   GFRESTBLACK  --   --   --   --   --  59* 58* 57* 69   JACOB 8.9 8.5* 8.9 8.9   < > 8.9 9.1 8.9 8.7    < > = values in this interval not displayed.     Recent Labs   Lab Test 07/08/24 1647 05/23/24  0721 03/01/24  1712 01/09/24  1714   BILITOTAL 0.3 0.4 0.3 0.5   ALKPHOS 155* 155* 144 162*   ALT 20 22 23 21   AST 27 21 24 26     CBC  Recent Labs   Lab Test 07/08/24  1647 07/05/24  1703 05/23/24  0721 03/01/24  1712 01/09/24  1714   HGB 11.4* 10.8* 10.7* 10.4* 11.9*   WBC 6.1  --  5.3 4.6 7.2   RBC 3.68*  --  3.58* 3.37* 3.88*   HCT 33.9*  --  32.6* 31.0* 34.5*   MCV 92  --  91 92 89   MCH 31.0  --  29.9 30.9 30.7   MCHC 33.6  --  32.8 33.5 34.5   RDW 13.5  --  13.3 13.2 13.3   *  --  102* 98* 108*     URINE STUDIES  Recent Labs   Lab Test 01/09/24 2010 01/30/23  0750 05/24/22  1035 11/24/20  1325   COLOR Light Yellow Light Yellow Light Yellow Yellow   APPEARANCE Clear Clear Clear Clear   URINEGLC Negative Negative Negative Negative   URINEBILI Negative Negative Negative Negative   URINEKETONE Negative Negative Negative Negative   SG 1.010 1.013 1.014  1.014   UBLD Negative Negative Trace* Negative   URINEPH 5.5 5.0 5.5 5.0   PROTEIN 50* 20* 100* 20*   NITRITE Negative Negative Negative Negative   LEUKEST Negative Negative Negative Negative   RBCU 1 1 1 1   WBCU <1 <1 1 <1     Recent Labs   Lab Test 01/30/23  0750 09/02/22  1143 05/24/22  1035 03/03/22  1145   UTPG 0.34* 1.35* 2.26* 1.58*     PTH  Recent Labs   Lab Test 03/01/24  1712 09/27/22  0841 12/01/21  1359   PTHI 65 31 28     IRON STUDIES  Recent Labs   Lab Test 09/27/22  0841 05/27/21  1729 11/24/20  1345   IRON 109 69 105    267 249   IRONSAT 44 26 42   NELY 624* 935* 1,104*       Cinda Cazares, NP

## 2024-07-16 ENCOUNTER — TELEPHONE (OUTPATIENT)
Dept: ENDOCRINOLOGY | Facility: CLINIC | Age: 59
End: 2024-07-16
Payer: COMMERCIAL

## 2024-07-16 NOTE — TELEPHONE ENCOUNTER
Left Voicemail (1st Attempt) and Sent Mychart (1st Attempt) for the patient to call back and schedule the following:    Appointment type: RET [facility]   Provider: Jennifer Vieira NP   Return date: 2-3 mos ( oK to use [facility] if needed )  Specialty phone number: [phone]  Additional appointment(s) needed: yes  Additonal Notes: NEW [facility] Nutrition, Next available  *Follow-up orders from 7/3/2024 appt

## 2024-07-18 ENCOUNTER — TELEPHONE (OUTPATIENT)
Dept: NEPHROLOGY | Facility: CLINIC | Age: 59
End: 2024-07-18
Payer: COMMERCIAL

## 2024-07-18 NOTE — TELEPHONE ENCOUNTER
Left Voicemail (1st Attempt) and Sent Mychart (1st Attempt) for the patient to schedule:    Appointment type: Return Nephrology  Provider: Jovanna Cazares  Return date: Approx. 1/10/25  Phone number: [phone]  Add'l appt(s) needed: Lab 1-hour prior to clinic visit (or 1-week if video visit)

## 2024-07-22 ENCOUNTER — TELEPHONE (OUTPATIENT)
Dept: NEPHROLOGY | Facility: CLINIC | Age: 59
End: 2024-07-22
Payer: COMMERCIAL

## 2024-07-30 NOTE — PROGRESS NOTES
Phone-Visit Details    Type of service:  Phone Visit    Phone call duration: 10 minutes    Distant Location (provider location):  Offsite (providers home) [facility] WE[facility]NAGEMENT [facility]IN[facility] Weight Management Nutrition Consultation    Camacho Bhagat is a 58 year old male presents today for new weight management nutrition consultation.  Patient referred by Jennifer Vieira NP on July 3, 2024.    Patient with Co-morbidities of obesity includin/28/2024     3:58 PM   --   I have the following health issues associated with obesity Type II Diabetes    High Blood Pressure    Sleep Apnea    Cancer    Osteoarthritis (joint disease)   I have the following symptoms associated with obesity Knee Pain    Lower Extremity Swelling    Back Pain    Fatigue    Hip Pain     7 years status post liver transplant with a history of type 2 diabetes, fibromyalgia, CKD, DVT, HTN, RONNIE, OA.     Hx of partial colectomy       Anthropometrics:  Estimated body mass index is 38.65 kg/m  as calculated from the following:    Height as of 7/10/24: 1.829 m (6').    Weight as of 7/10/24: 129.3 kg (285 lb).    Medications for Weight Loss:  Mounjaro 2.5 mg - Doing good with controlling blood sugars and some appetite suppression.     Patient feels he already eats a healthful diet.     Per Jennifer's visit: Breakfast- eggs, sausage, oatmeal   Lunch- sandwich/ chips - sometimes cookie   Dinner around 630 - protein and veggies   Not a lot of snacking      Because of colectomy has to avoid salad, fatty foods, some cooked veggies from high potassium   Unsweetened ice tea   1 gallon of water/ ice tea and milk/water daily     NUTRITION HISTORY  Food allergies: NKFA  Food intolerances: None   Vitamin/Mineral Supplements: Vitamin D3  Previous methods of diet modification for weight loss: watching portions/calories, weight watchers   RD before: yes, transplant RD a few years ago.     Diet recall:   Eating 3 meals per day.   Breakfast - eggs  with turkey sausage   11-1 PM - soup from work site + chicken salad   Dinner - Lean proteins   Snacks - minimal     Hydration: gallon + fluids (water, iced tea, milk and coffee).     Patient inquired on this writer's thoughts around the [facility] diet and describes it being similar to the Mediterranean Diet. The Mediterranean diet may be difficult to follow strictly as this pattern of eating consists of foods high in potassium but agreed that it is a healthful way of eating.         6/28/2024     3:58 PM   Diet Recall Review with Patient   If you do eat breakfast, what types of food do you eat? Hot meals   If you do eat lunch, what types of food do you typically eat? Sandwhich, chips   If you do eat supper, what types of food do you typically eat? Poultry lean pork vegetable   How many glasses of juice do you drink in a typical day? 0   How many of glasses of milk do you drink in a typical day? 2   If you do drink milk, what type? Skim   How many 8oz glasses of sugar containing drinks such as Craig-Aid/sweet tea do you drink in a day? 0   How many cans/bottles of sugar pop/soda/tea/sports drinks do you drink in a day? 4   How many cans/bottles of diet pop/soda/tea or sports drink do you drink in a day? 0   How often do you have a drink of alcohol? Never           6/28/2024     3:58 PM   Eating Habits   Generally, my meals include foods like these bread, pasta, rice, potatoes, corn, crackers, sweet dessert, pop, or juice Almost Everyday   Generally, my meals include foods like these fried meats, brats, burgers, french fries, pizza, cheese, chips, or ice cream Once a Week   Eat fast food (like McDonalds, Burger Gabriel, Taco Bell) Never   Eat at a buffet or sit-down restaurant Never   Eat most of my meals in front of the TV or computer A Few Times a Week   Often skip meals, eat at random times, have no regular eating times Almost Everyday   Rarely sit down for a meal but snack or graze throughout Never   Eat extra  snacks between meals Never   Eat most of my food at the end of the day Never   Eat in the middle of the night or wake up at night to eat Never   Eat extra snacks to prevent or correct low blood sugar Never   Eat to prevent acid reflux or stomach pain Never   Worry about not having enough food to eat Never   I eat when I am depressed Never   I eat when I am stressed Never   I eat when I am bored Never   I eat when I am anxious Never   I eat when I am happy or as a reward Never   I feel hungry all the time even if I just have eaten Never   Feeling full is important to me Less Than Weekly   I finish all the food on my plate even if I am already full Almost Everyday   I can't resist eating delicious food or walk past the good food/smell Never   I eat/snack without noticing that I am eating Never   I eat when I am preparing the meal Never   I eat more than usual when I see others eating Never   I have trouble not eating sweets, ice cream, cookies, or chips if they are around the house Never   I think about food all day Never   What foods, if any, do you crave? None           6/28/2024     3:58 PM   Amount of Food   I feel out of control when eating Never   I eat a large amount of food, like a loaf of bread, a box of cookies, a pint/quart of ice cream, all at once Never   I eat a large amount of food even when I am not hungry Never   I eat rapidly Never   I eat alone because I feel embarrassed and do not want others to see how much I have eaten Never   I eat until I am uncomfortably full Monthly   I feel bad, disgusted, or guilty after I overeat Never     Physical Activity:  Severe foot pain, arthritis limits activity        6/28/2024     3:58 PM   Activity/Exercise History   How much of a typical 12 hour day do you spend sitting? Most of the Day   How much of a typical 12 hour day do you spend lying down? Less Than Half the Day   How much of a typical day do you spend walking/standing? Half the Day   How many hours (not  "including work) do you spend on the TV/Video Games/Computer/Tablet/Phone? 2-3 Hours   How many times a week are you active for the purpose of exercise? 2-3 Times a Week   What keeps you from being more active? Pain    Shortness of Breath    Lack of Time   How many total minutes do you spend doing some activity for the purpose of exercising when you exercise? None     Nutrition Prescription  Recommended energy/nutrient modification.    Nutrition Diagnosis  Obesity r/t long history of positive energy balance aeb BMI >30.    Nutrition Intervention  Provided education on nutrition considerations when starting GLP1 medication including, changes in meal/snack volumes; meeting protein, hydration and micronutrient needs; limiting high-fat foods; and diet/lifestyle strategies for preventing constipation.    Expected Engagement: fair    Nutrition Goals  1) Aim for 60-90 grams of protein daily.   2) Incorporate fruits, vegetables and whole grains with meals as you can with potassium restriction.   3) Continue with good hydration.     The Plate Method  [email or web address]    Protein Sources   [email or web address]     Quick/Easy Protein Sources:  Hard boiled eggs  Part-skim cheese sticks  Baby Bell cheese rounds  Low-fat/low-sugar Greek yogurt  Low-fat cottage cheese  Lean deli meat (chicken/turkey/ham)  Roasted chickpeas or lentils  Nuts   Turkey meat stick  Protein shake/bar  \"P3\" snack (cheese, nuts, deli meat)  Aldi's \"Protein Bread\"   \"Egglife\" egg white wrap    Tuna/salmon can/packet     Carbohydrates  [email or web address]     Mindful Eating  [email or web address]     Summary of Volumetrics Eating Plan  [email or web address]     The Mediterranean Diet:  A Mediterranean-style diet is a healthy way of eating. It includes a lot of foods from plants, such as vegetables, fruits, beans, nuts, seeds, whole grains, and olive oil. It also includes dairy foods, eggs, fish, and poultry, " but in smaller amounts. Fish and poultry are eaten more often than red meat. This diet limits sugar, highly processed foods, refined carbohydrates, and processed meats. Many studies over time have shown health benefits to eating this way. It focuses on making meals with fresh foods that are plant-based and minimally processed.    This plan of eating is inspired by how people eat in countries around the [place] Sea. They include [place], [place], [place], [place], [place], and [place]. But it uses foods you can buy in almost any grocery store.    Health benefits of a Mediterranean diet:  This diet is high in fiber, lean protein, and healthy oils. It s low in saturated fats and sugar. The diet has been shown to help prevent or manage:    Depression  Diabetes  Heart disease  High blood pressure  Parkinson disease  Alzheimer disease  Certain cancers    What do I eat on a Mediterranean diet?  Plan each meal around vegetables and whole grains. Use olive oil. Add nuts and legumes. Include fish or lean protein. Foods to focus your meals around include:    Vegetables: This includes leafy greens, tomatoes, squash, peppers, cucumbers, green beans, eggplant, avocados, potatoes, and olives. You can use fresh or frozen vegetables.    Fruits: This includes apples, raspberries, strawberries, grapes, citrus fruit, such as oranges and grapefruit, stone fruit, such as apricots and peaches, plus figs, dates, and melon.    Whole grains: This includes brown rice, whole oats, quinoa, millet, whole grain bread, whole-wheat pasta, and crackers made with whole grains.    Beans and legumes: These include lentils, chickpeas, and beans, such as tang, doug, kidney, and black beans. Peanuts are also legumes.    Nuts and seeds: These include walnuts, almonds, sunflower seeds, cashews, Brazil nuts, and pecans.    Healthy oil: Olive oil is the most common oil in the Mediterranean diet. But other healthy oils are avocado, canola, sunflower,  safflower, and corn oils.    Herbs and spices: Season food with oregano, pepper, marisa, tarragon, thyme, basil, cinnamon, and cumin.    Foods to eat in smaller amounts:   Dairy foods, such as cheese, yogurt, and butter  Poultry, such as chicken and duck  Eggs  Fish and seafood such as salmon, trout, mackerel, michelle, tuna, sardines, anchovies, and whitefish. It also includes shellfish such as shrimp, oysters, mussels, and clams.  The American Heart Association advises to limit or have no alcohol. Wine may be OK for certain people in low or moderate amounts, with meals. Talk with your provider about your situation and risk.  Occasional treats    Occasional treats. Limit these foods in your weekly eating plan:  Red meats, such as beef, lamb, and pork  Processed meats  Refined grains, such as white rice and foods made with white flour  Sugary treats, such as chocolate, candy, or pastries    Adding lots of flavor:  You can liven up fresh foods with many kinds of flavor. Try these sauces, dips, and seasonings:  Hummus  Marinara sauce  Salsa  Vinaigrette dressing  Lemon/lime juice  Cooking rikki    Tips for eating out:  Skip fried foods. These have a lot of saturated fat.  Look for fish dishes that are cooked without cream or butter.  Pick salads that have nuts and seeds.  Choose vegetarian choices that don t have too much cheese.    Websites for recipes and info on the Mediterranean Diet:    https://www.mayoclinic.org/healthy-lifestyle/nutrition-and-healthy-eating/in-depth/mediterranean-diet/art-67050023#:~:text=The%20foundation%20of%20the%20Mediterranean,Mediterranean%20Diet%2C%20as%20is%20seafood.    Www.oldwayspt.org    https://www.mayoclinic.org/healthy-lifestyle/nutrition-and-healthy-eating/in-depth/mediterranean-diet-recipes/art-87513124    Follow-Up: as needed.     Time spent with patient: 10 minutes.  Janice Santamaria RD, REFUGIO

## 2024-07-31 ENCOUNTER — VIRTUAL VISIT (OUTPATIENT)
Dept: ENDOCRINOLOGY | Facility: CLINIC | Age: 59
End: 2024-07-31
Payer: COMMERCIAL

## 2024-07-31 VITALS — BODY MASS INDEX: 38.64 KG/M2 | HEIGHT: 72 IN

## 2024-07-31 DIAGNOSIS — Z79.4 TYPE 2 DIABETES MELLITUS WITH DIABETIC POLYNEUROPATHY, WITH LONG-TERM CURRENT USE OF INSULIN (H): ICD-10-CM

## 2024-07-31 DIAGNOSIS — Z71.3 NUTRITIONAL COUNSELING: Primary | ICD-10-CM

## 2024-07-31 DIAGNOSIS — E66.813 CLASS 3 SEVERE OBESITY WITH SERIOUS COMORBIDITY AND BODY MASS INDEX (BMI) OF 40.0 TO 44.9 IN ADULT, UNSPECIFIED OBESITY TYPE (H): ICD-10-CM

## 2024-07-31 DIAGNOSIS — Z94.4 S/P LIVER TRANSPLANT (H): ICD-10-CM

## 2024-07-31 DIAGNOSIS — E11.42 TYPE 2 DIABETES MELLITUS WITH DIABETIC POLYNEUROPATHY, WITH LONG-TERM CURRENT USE OF INSULIN (H): ICD-10-CM

## 2024-07-31 DIAGNOSIS — E66.01 CLASS 3 SEVERE OBESITY WITH SERIOUS COMORBIDITY AND BODY MASS INDEX (BMI) OF 40.0 TO 44.9 IN ADULT, UNSPECIFIED OBESITY TYPE (H): ICD-10-CM

## 2024-07-31 PROCEDURE — 99207 PR NO CHARGE LOS: CPT | Mod: 93

## 2024-07-31 PROCEDURE — 97802 MEDICAL NUTRITION INDIV IN: CPT | Mod: 93

## 2024-07-31 NOTE — PATIENT INSTRUCTIONS
"Jon Sauer,     Follow-up with RD as needed.     Thank you,    Janice Santamaria, RD, LD  If you would like to schedule or reschedule an appointment with the RD, please call [phone]    Nutrition Goals  1) Aim for 60-90 grams of protein daily.   2) Incorporate fruits, vegetables and whole grains with meals as you can with potassium restriction.   3) Continue with good hydration.     The Plate Method  [email or web address]    Protein Sources   [email or web address]     Quick/Easy Protein Sources:  Hard boiled eggs  Part-skim cheese sticks  Baby Bell cheese rounds  Low-fat/low-sugar Greek yogurt  Low-fat cottage cheese  Lean deli meat (chicken/turkey/ham)  Roasted chickpeas or lentils  Nuts   Turkey meat stick  Protein shake/bar  \"P3\" snack (cheese, nuts, deli meat)  Aldi's \"Protein Bread\"   \"Egglife\" egg white wrap    Tuna/salmon can/packet     Carbohydrates  [email or web address]     Mindful Eating  [email or web address]     Summary of Volumetrics Eating Plan  [email or web address]     The Mediterranean Diet:  A Mediterranean-style diet is a healthy way of eating. It includes a lot of foods from plants, such as vegetables, fruits, beans, nuts, seeds, whole grains, and olive oil. It also includes dairy foods, eggs, fish, and poultry, but in smaller amounts. Fish and poultry are eaten more often than red meat. This diet limits sugar, highly processed foods, refined carbohydrates, and processed meats. Many studies over time have shown health benefits to eating this way. It focuses on making meals with fresh foods that are plant-based and minimally processed.    This plan of eating is inspired by how people eat in countries around the [place] Sea. They include [place], [place], [place], [place], [place], and [place]. But it uses foods you can buy in almost any grocery store.    Health benefits of a Mediterranean diet:  This diet is high in fiber, lean protein, and healthy oils. " It s low in saturated fats and sugar. The diet has been shown to help prevent or manage:    Depression  Diabetes  Heart disease  High blood pressure  Parkinson disease  Alzheimer disease  Certain cancers    What do I eat on a Mediterranean diet?  Plan each meal around vegetables and whole grains. Use olive oil. Add nuts and legumes. Include fish or lean protein. Foods to focus your meals around include:    Vegetables: This includes leafy greens, tomatoes, squash, peppers, cucumbers, green beans, eggplant, avocados, potatoes, and olives. You can use fresh or frozen vegetables.    Fruits: This includes apples, raspberries, strawberries, grapes, citrus fruit, such as oranges and grapefruit, stone fruit, such as apricots and peaches, plus figs, dates, and melon.    Whole grains: This includes brown rice, whole oats, quinoa, millet, whole grain bread, whole-wheat pasta, and crackers made with whole grains.    Beans and legumes: These include lentils, chickpeas, and beans, such as tang, doug, kidney, and black beans. Peanuts are also legumes.    Nuts and seeds: These include walnuts, almonds, sunflower seeds, cashews, Brazil nuts, and pecans.    Healthy oil: Olive oil is the most common oil in the Mediterranean diet. But other healthy oils are avocado, canola, sunflower, safflower, and corn oils.    Herbs and spices: Season food with oregano, pepper, marisa, tarragon, thyme, basil, cinnamon, and cumin.    Foods to eat in smaller amounts:   Dairy foods, such as cheese, yogurt, and butter  Poultry, such as chicken and duck  Eggs  Fish and seafood such as salmon, trout, mackerel, michelle, tuna, sardines, anchovies, and whitefish. It also includes shellfish such as shrimp, oysters, mussels, and clams.  The American Heart Association advises to limit or have no alcohol. Wine may be OK for certain people in low or moderate amounts, with meals. Talk with your provider about your situation and risk.  Occasional  treats    Occasional treats. Limit these foods in your weekly eating plan:  Red meats, such as beef, lamb, and pork  Processed meats  Refined grains, such as white rice and foods made with white flour  Sugary treats, such as chocolate, candy, or pastries    Adding lots of flavor:  You can liven up fresh foods with many kinds of flavor. Try these sauces, dips, and seasonings:  Hummus  Marinara sauce  Salsa  Vinaigrette dressing  Lemon/lime juice  Cooking rikki    Tips for eating out:  Skip fried foods. These have a lot of saturated fat.  Look for fish dishes that are cooked without cream or butter.  Pick salads that have nuts and seeds.  Choose vegetarian choices that don t have too much cheese.    Websites for recipes and info on the Mediterranean Diet:    https://www.XenSource.org/healthy-lifestyle/nutrition-and-healthy-eating/in-depth/mediterranean-diet/art-57087828#:~:text=The%20foundation%20of%20the%20Mediterranean,Mediterranean%20Diet%2C%20as%20is%20seafood.    Www.oldAthletic Standardpt.org    https://www.XenSource.org/healthy-lifestyle/nutrition-and-healthy-eating/in-depth/mediterranean-diet-recipes/art-72140165        COMPREHENSIVE WEIGHT MANAGEMENT PROGRAM  VIRTUAL SUPPORT GROUPS    At [facility], our Comprehensive Weight Management program offers on-line support groups for patients who are working on weight loss and considering, preparing for, or have had weight loss surgery.     There is no cost for this opportunity.  You are invited to attend the?Virtual Support Groups?provided by any of the following locations:    [facility] via [employer] Teams with Pema Melo RN  2.   [place] [facility] with Isidro Kumari, PhD, LP  3.   [place] [facility] with Kathy Devine RN  4.   [facility] via a Zoom Meeting with ISADORA Mann    The following Support Group information can also be found on our  "website:  [email or web address]      [facility] Weight Loss Surgery Support Group  The support group is a patient-lead forum that meets monthly to share experiences, encouragement and education. It is open to those who have had weight loss surgery, are scheduled for surgery, or are considering surgery.   WHEN: This group meets on the 3rd Wednesday of each month from 5:00PM - 6:00PM virtually using Microsoft Teams.   FACILITATOR: Led by Pema Flowers RD, LD, RN, the program's Clinical Coordinator.   TO REGISTER: Please contact the clinic via G5 or call the nurse line directly at [phone] to inform our staff that you would like an invite sent to you and to let us know the email you would like the invite sent to. Prior to the meeting, a link with directions on how to join the meeting will be sent to you.    2024 Meetings   January 17  February 21  March 20  April 17  May 15  Shala 19      [facility] Bariatric Care Support Group?  This is open to all pre- and post- operative bariatric surgery patients as well as their support system.   WHEN: This group meets the 3rd Tuesday of each month from 6:30 PM - 8:00 PM virtually using Microsoft Teams.   FACILITATOR: Led by Isidro Kumari, Ph.D who is a Licensed Psychologist with the [facility] Comprehensive Weight Management Program.   TO REGISTER: Please send an email to Isidro Kumari, Ph.D., LP at?[email or web address]?if you would like an invitation to the group. Prior to the meeting, a link with directions on how to join the meeting will be sent to you.    2024 Meetings  January 16: \"Medication Management and Bariatric Surgery\", Xin Rogers, PharmD, Pharmacy Resident at [facility]  February 20: \"A Bariatric Surgery Patient's Perspective\", JOSEY Webster, [facility], Behavioral Health " "[facility]in[facility]  March 19  April 16  May 21  Shala 18: \"Nutritional Labeling\", Dietitian speaker to be announced.  November 19: \"Holiday Eating\", Dietitian speaker to be announced.    [facility] Post-Operative Bariatric Surgery Support Group  This is a support group for [facility] bariatric patients (and those external to [facility]) who have had bariatric surgery and are at least 3 months post-surgery.  WHEN: This support group meets the 4th Wednesday of the month from 11:00 AM - 12:00 PM virtually using Microsoft Teams.   FACILITATOR: Led by Certified Bariatric Nurse, Kathy Devine RN.   TO REGISTER: Please send an email to Kathy at [email or web address] if you would like an invitation to the group.  Prior to the meeting, a link with directions on how to join the meeting will be sent to you.    2024 Meetings  January 24  February 28  March 27  April 24  May 22  Shala 26    [facility] Healthy Lifestyle Group?  This is a 60 minute virtual coaching group for those who want to lead a healthier lifestyle. Come together to set goals and overcome barriers in a supportive group environment. We will address the four pillars of health: nutrition, exercise, sleep and emotional well-being.  This group is highly recommended for those who are participating in the 24 week Healthy Lifestyle Plan and our Health Coaching sessions.  WHEN: This group meets the 1st Friday of the month, 12:30 PM - 1:30 PM online, via a zoom meeting.    FACILITATOR: Led by National Board Certified Health and , Kathy Villegas [facility].   TO REGISTER: Please call the Call Center at [phone] to register. You will get an appointment to attend in [email or web address]. Fifteen minutes prior to the meeting, complete the e-check in and you will get the link to join the meeting.    There is no charge to attend this " "group and space is limited.     2024 Meetings  January 5: \"New Years Vision: Manifest your Best 2024!\" (guided imagery,  journaling and discussion)  February 2: \"Let's Talk\"  March 1: \"10 Percent Happier\" by Raad Candelaria (Book Bites - a guided discussion on the nuggets of wisdom from favorite wellness books, no need to read the book but highly encouraged)  April 5: \"Let's Talk\"  May 3: \"Essentialism: The Disciplined Pursuit of Less\" by Andrea Navarro (Book Bites discussion)  June 7: \"Let's Talk\"  July 5: NO MEETING, off for the 4th of July Holiday  August 2: \"The Blue Zones, Secrets for Living a Longer Life\" by Raad Bowser (Book Bites discussion)        "

## 2024-07-31 NOTE — LETTER
2024       RE: Camacho Bhagat  [address] [facility] [address]     Dear Colleague,    Thank you for referring your patient, Camacho Bhagat, to the [facility] [place] at [facility]. Please see a copy of my visit note below.    Phone-Visit Details    Type of service:  Phone Visit    Phone call duration: 10 minutes    Distant Location (provider location):  Offsite (providers home) [facility] [place]     New Weight Management Nutrition Consultation    Camacho Bhagat is a 58 year old male presents today for new weight management nutrition consultation.  Patient referred by Jennifer Vieira NP on July 3, 2024.    Patient with Co-morbidities of obesity includin/28/2024     3:58 PM   --   I have the following health issues associated with obesity Type II Diabetes    High Blood Pressure    Sleep Apnea    Cancer    Osteoarthritis (joint disease)   I have the following symptoms associated with obesity Knee Pain    Lower Extremity Swelling    Back Pain    Fatigue    Hip Pain     7 years status post liver transplant with a history of type 2 diabetes, fibromyalgia, CKD, DVT, HTN, RONNIE, OA.     Hx of partial colectomy       Anthropometrics:  Estimated body mass index is 38.65 kg/m  as calculated from the following:    Height as of 7/10/24: 1.829 m (6').    Weight as of 7/10/24: 129.3 kg (285 lb).    Medications for Weight Loss:  Mounjaro 2.5 mg - Doing good with controlling blood sugars and some appetite suppression.     Patient feels he already eats a healthful diet.     Per Jennifer's visit: Breakfast- eggs, sausage, oatmeal   Lunch- sandwich/ chips - sometimes cookie   Dinner around 630 - protein and veggies   Not a lot of snacking      Because of colectomy has to avoid salad, fatty foods, some cooked veggies from high potassium   Unsweetened ice tea   1 gallon of water/ ice tea and milk/water  daily     NUTRITION HISTORY  Food allergies: NKFA  Food intolerances: None   Vitamin/Mineral Supplements: Vitamin D3  Previous methods of diet modification for weight loss: watching portions/calories, weight watchers   RD before: yes, transplant RD a few years ago.     Diet recall:   Eating 3 meals per day.   Breakfast - eggs with turkey sausage   11-1 PM - soup from work site + chicken salad   Dinner - Lean proteins   Snacks - minimal     Hydration: gallon + fluids (water, iced tea, milk and coffee).     Patient inquired on this writer's thoughts around the [facility] diet and describes it being similar to the Mediterranean Diet. The Mediterranean diet may be difficult to follow strictly as this pattern of eating consists of foods high in potassium but agreed that it is a healthful way of eating.         6/28/2024     3:58 PM   Diet Recall Review with Patient   If you do eat breakfast, what types of food do you eat? Hot meals   If you do eat lunch, what types of food do you typically eat? Sandwhich, chips   If you do eat supper, what types of food do you typically eat? Poultry lean pork vegetable   How many glasses of juice do you drink in a typical day? 0   How many of glasses of milk do you drink in a typical day? 2   If you do drink milk, what type? Skim   How many 8oz glasses of sugar containing drinks such as Craig-Aid/sweet tea do you drink in a day? 0   How many cans/bottles of sugar pop/soda/tea/sports drinks do you drink in a day? 4   How many cans/bottles of diet pop/soda/tea or sports drink do you drink in a day? 0   How often do you have a drink of alcohol? Never           6/28/2024     3:58 PM   Eating Habits   Generally, my meals include foods like these bread, pasta, rice, potatoes, corn, crackers, sweet dessert, pop, or juice Almost Everyday   Generally, my meals include foods like these fried meats, brats, burgers, french fries, pizza, cheese, chips, or ice cream Once a Week   Eat fast food (like  Geeta Lacy, Taco Bell) Never   Eat at a buffet or sit-down restaurant Never   Eat most of my meals in front of the TV or computer A Few Times a Week   Often skip meals, eat at random times, have no regular eating times Almost Everyday   Rarely sit down for a meal but snack or graze throughout Never   Eat extra snacks between meals Never   Eat most of my food at the end of the day Never   Eat in the middle of the night or wake up at night to eat Never   Eat extra snacks to prevent or correct low blood sugar Never   Eat to prevent acid reflux or stomach pain Never   Worry about not having enough food to eat Never   I eat when I am depressed Never   I eat when I am stressed Never   I eat when I am bored Never   I eat when I am anxious Never   I eat when I am happy or as a reward Never   I feel hungry all the time even if I just have eaten Never   Feeling full is important to me Less Than Weekly   I finish all the food on my plate even if I am already full Almost Everyday   I can't resist eating delicious food or walk past the good food/smell Never   I eat/snack without noticing that I am eating Never   I eat when I am preparing the meal Never   I eat more than usual when I see others eating Never   I have trouble not eating sweets, ice cream, cookies, or chips if they are around the house Never   I think about food all day Never   What foods, if any, do you crave? None           6/28/2024     3:58 PM   Amount of Food   I feel out of control when eating Never   I eat a large amount of food, like a loaf of bread, a box of cookies, a pint/quart of ice cream, all at once Never   I eat a large amount of food even when I am not hungry Never   I eat rapidly Never   I eat alone because I feel embarrassed and do not want others to see how much I have eaten Never   I eat until I am uncomfortably full Monthly   I feel bad, disgusted, or guilty after I overeat Never     Physical Activity:  Severe foot pain, arthritis  "limits activity        6/28/2024     3:58 PM   Activity/Exercise History   How much of a typical 12 hour day do you spend sitting? Most of the Day   How much of a typical 12 hour day do you spend lying down? Less Than Half the Day   How much of a typical day do you spend walking/standing? Half the Day   How many hours (not including work) do you spend on the TV/Video Games/Computer/Tablet/Phone? 2-3 Hours   How many times a week are you active for the purpose of exercise? 2-3 Times a Week   What keeps you from being more active? Pain    Shortness of Breath    Lack of Time   How many total minutes do you spend doing some activity for the purpose of exercising when you exercise? None     Nutrition Prescription  Recommended energy/nutrient modification.    Nutrition Diagnosis  Obesity r/t long history of positive energy balance aeb BMI >30.    Nutrition Intervention  Provided education on nutrition considerations when starting GLP1 medication including, changes in meal/snack volumes; meeting protein, hydration and micronutrient needs; limiting high-fat foods; and diet/lifestyle strategies for preventing constipation.    Expected Engagement: fair    Nutrition Goals  1) Aim for 60-90 grams of protein daily.   2) Incorporate fruits, vegetables and whole grains with meals as you can with potassium restriction.   3) Continue with good hydration.     The Plate Method  [email or web address]    Protein Sources   [email or web address]     Quick/Easy Protein Sources:  Hard boiled eggs  Part-skim cheese sticks  Baby Bell cheese rounds  Low-fat/low-sugar Greek yogurt  Low-fat cottage cheese  Lean deli meat (chicken/turkey/ham)  Roasted chickpeas or lentils  Nuts   Turkey meat stick  Protein shake/bar  \"P3\" snack (cheese, nuts, deli meat)  [employer]i's \"Protein Bread\"   \"Egglife\" egg white wrap    Tuna/salmon can/packet     Carbohydrates  [email or web address]     Mindful Eating  [email or web address] "     Summary of Volumetrics Eating Plan  [email or web address]     The Mediterranean Diet:  A Mediterranean-style diet is a healthy way of eating. It includes a lot of foods from plants, such as vegetables, fruits, beans, nuts, seeds, whole grains, and olive oil. It also includes dairy foods, eggs, fish, and poultry, but in smaller amounts. Fish and poultry are eaten more often than red meat. This diet limits sugar, highly processed foods, refined carbohydrates, and processed meats. Many studies over time have shown health benefits to eating this way. It focuses on making meals with fresh foods that are plant-based and minimally processed.    This plan of eating is inspired by how people eat in countries around the [place] Sea. They include [place], [place], [place], [place], [place], and [place]. But it uses foods you can buy in almost any grocery store.    Health benefits of a Mediterranean diet:  This diet is high in fiber, lean protein, and healthy oils. It s low in saturated fats and sugar. The diet has been shown to help prevent or manage:    Depression  Diabetes  Heart disease  High blood pressure  Parkinson disease  Alzheimer disease  Certain cancers    What do I eat on a Mediterranean diet?  Plan each meal around vegetables and whole grains. Use olive oil. Add nuts and legumes. Include fish or lean protein. Foods to focus your meals around include:    Vegetables: This includes leafy greens, tomatoes, squash, peppers, cucumbers, green beans, eggplant, avocados, potatoes, and olives. You can use fresh or frozen vegetables.    Fruits: This includes apples, raspberries, strawberries, grapes, citrus fruit, such as oranges and grapefruit, stone fruit, such as apricots and peaches, plus figs, dates, and melon.    Whole grains: This includes brown rice, whole oats, quinoa, millet, whole grain bread, whole-wheat pasta, and crackers made with whole grains.    Beans and legumes: These include lentils,  chickpeas, and beans, such as tang, doug, kidney, and black beans. Peanuts are also legumes.    Nuts and seeds: These include walnuts, almonds, sunflower seeds, cashews, Brazil nuts, and pecans.    Healthy oil: Olive oil is the most common oil in the Mediterranean diet. But other healthy oils are avocado, canola, sunflower, safflower, and corn oils.    Herbs and spices: Season food with oregano, pepper, marisa, tarragon, thyme, basil, cinnamon, and cumin.    Foods to eat in smaller amounts:   Dairy foods, such as cheese, yogurt, and butter  Poultry, such as chicken and duck  Eggs  Fish and seafood such as salmon, trout, mackerel, michelle, tuna, sardines, anchovies, and whitefish. It also includes shellfish such as shrimp, oysters, mussels, and clams.  The American Heart Association advises to limit or have no alcohol. Wine may be OK for certain people in low or moderate amounts, with meals. Talk with your provider about your situation and risk.  Occasional treats    Occasional treats. Limit these foods in your weekly eating plan:  Red meats, such as beef, lamb, and pork  Processed meats  Refined grains, such as white rice and foods made with white flour  Sugary treats, such as chocolate, candy, or pastries    Adding lots of flavor:  You can liven up fresh foods with many kinds of flavor. Try these sauces, dips, and seasonings:  Heather Torres sauce  Salsa  Vinaicorineette dressing  Lemon/lime juice  Cooking rikki    Tips for eating out:  Skip fried foods. These have a lot of saturated fat.  Look for fish dishes that are cooked without cream or butter.  Pick salads that have nuts and seeds.  Choose vegetarian choices that don t have too much cheese.    Websites for recipes and info on the Mediterranean  Diet:    https://www.[facility].org/healthy-lifestyle/nutrition-and-healthy-eating/in-depth/mediterranean-diet/art-69904908#:~:text=The%20foundation%20of%20the%20Mediterranean,Mediterranean%20Diet%2C%20as%20is%20seafood.    Www.oldStumpwisept.org    https://www.[facility].org/healthy-lifestyle/nutrition-and-healthy-eating/in-depth/mediterranean-diet-recipes/art-97000181    Follow-Up: as needed.     Time spent with patient: 10 minutes.  Janice Santamaria RD, LD      Again, thank you for allowing me to participate in the care of your patient.      Sincerely,    Janice Santamaria RD

## 2024-07-31 NOTE — NURSING NOTE
Current patient location:  [facility]    Is the patient currently in the state of [place]? YES    Visit mode:TELEPHONE    If the visit is dropped, the patient can be reconnected by: TELEPHONE VISIT: Phone number: [phone]    Will anyone else be joining the visit? NO  (If patient encounters technical issues they should call [phone]    How would you like to obtain your AVS? Declined    Are changes needed to the allergy or medication list? Pt stated no changes to allergies and Pt stated no med changes    Are refills needed on medications prescribed by this physician? NO    Reason for visit: Consult    Denisse SCHMID    No current wt to report

## 2024-08-08 ASSESSMENT — ANXIETY QUESTIONNAIRES
2. NOT BEING ABLE TO STOP OR CONTROL WORRYING: NOT AT ALL
5. BEING SO RESTLESS THAT IT IS HARD TO SIT STILL: NOT AT ALL
3. WORRYING TOO MUCH ABOUT DIFFERENT THINGS: NOT AT ALL
7. FEELING AFRAID AS IF SOMETHING AWFUL MIGHT HAPPEN: NOT AT ALL
8. IF YOU CHECKED OFF ANY PROBLEMS, HOW DIFFICULT HAVE THESE MADE IT FOR YOU TO DO YOUR WORK, TAKE CARE OF THINGS AT HOME, OR GET ALONG WITH OTHER PEOPLE?: NOT DIFFICULT AT ALL
IF YOU CHECKED OFF ANY PROBLEMS ON THIS QUESTIONNAIRE, HOW DIFFICULT HAVE THESE PROBLEMS MADE IT FOR YOU TO DO YOUR WORK, TAKE CARE OF THINGS AT HOME, OR GET ALONG WITH OTHER PEOPLE: NOT DIFFICULT AT ALL
1. FEELING NERVOUS, ANXIOUS, OR ON EDGE: NOT AT ALL
4. TROUBLE RELAXING: NOT AT ALL
GAD7 TOTAL SCORE: 0
7. FEELING AFRAID AS IF SOMETHING AWFUL MIGHT HAPPEN: NOT AT ALL
GAD7 TOTAL SCORE: 0
6. BECOMING EASILY ANNOYED OR IRRITABLE: NOT AT ALL

## 2024-08-12 ENCOUNTER — OFFICE VISIT (OUTPATIENT)
Dept: INTERNAL MEDICINE | Facility: CLINIC | Age: 59
End: 2024-08-12
Payer: COMMERCIAL

## 2024-08-12 VITALS
HEART RATE: 72 BPM | SYSTOLIC BLOOD PRESSURE: 171 MMHG | OXYGEN SATURATION: 97 % | BODY MASS INDEX: 38.97 KG/M2 | DIASTOLIC BLOOD PRESSURE: 80 MMHG | WEIGHT: 287.4 LBS

## 2024-08-12 DIAGNOSIS — Z23 NEED FOR VACCINATION: ICD-10-CM

## 2024-08-12 DIAGNOSIS — L82.0 INFLAMED SEBORRHEIC KERATOSIS: Primary | ICD-10-CM

## 2024-08-12 DIAGNOSIS — Z79.4 TYPE 2 DIABETES MELLITUS WITH DIABETIC POLYNEUROPATHY, WITH LONG-TERM CURRENT USE OF INSULIN (H): ICD-10-CM

## 2024-08-12 DIAGNOSIS — E11.42 TYPE 2 DIABETES MELLITUS WITH DIABETIC POLYNEUROPATHY, WITH LONG-TERM CURRENT USE OF INSULIN (H): ICD-10-CM

## 2024-08-12 PROCEDURE — 99213 OFFICE O/P EST LOW 20 MIN: CPT | Performed by: INTERNAL MEDICINE

## 2024-08-12 ASSESSMENT — PATIENT HEALTH QUESTIONNAIRE - PHQ9
SUM OF ALL RESPONSES TO PHQ QUESTIONS 1-9: 0
10. IF YOU CHECKED OFF ANY PROBLEMS, HOW DIFFICULT HAVE THESE PROBLEMS MADE IT FOR YOU TO DO YOUR WORK, TAKE CARE OF THINGS AT HOME, OR GET ALONG WITH OTHER PEOPLE: NOT DIFFICULT AT ALL
SUM OF ALL RESPONSES TO PHQ QUESTIONS 1-9: 0

## 2024-08-12 NOTE — PROGRESS NOTES
"  Assessment & Plan     Inflamed seborrheic keratosis  -Exophytic seborrheic keratosis on left lower back. Exophytic nature makes it likely to get caught on clothing causing pain and discomfort. Discuss at dermatology appointment on 8/30. They will likely be able to remove.    Need for vaccination  -Shingrx and RSV vaccine covered at age 60 and could be done this fall  -Can get flu and COVID vaccine this fall when new formulations come available    Type 2 diabetes mellitus with diabetic polyneuropathy, with long-term current use of insulin (H)  -Can restart the alpha-lipoic acid 600mg twice a day for neuropathy  -Discussed amitriptyline as another option for treatment of neuropathy. Discussed common side effects such as dry eyes and dry mouth. Patient would like to try alpha-lipoic acid at this time.   -Did not like gabapentin and did not tolerate lyrica.      BMI  Estimated body mass index is 38.97 kg/m  as calculated from the following:    Height as of 7/31/24: 1.829 m (6' 0.01\").    Weight as of this encounter: 130.4 kg (287 lb 6.4 oz).             No follow-ups on file.    Subjective   Camacho is a 58 year old, presenting for the following health issues:  Follow Up  Here for 6 month follow up.  Due for skin check and going to dermatology on 8/30. Concerned about new growth on back noticed about 6 months ago. Gotten larger, painful starting 3 weeks ago. Itchy very irregularly. No bleeding.     Neuropathy: Patient reports it is \"horrible\". Haven't gotten prescription for new orthopedic shoes yet.   Not taking gabapentin due to concern with CKD. Patient has used alpha-lipoic acid in the past and has considered starting that again for neuropathy.     Now taking losartan 25mg.    Started tirzepatide (Mounjaro) on 7/3 and has improved glucose significantly.     Doesn't need any medication refills today.         8/12/2024     7:12 AM   Additional Questions   Roomed by [facility] EMT     History of Present Illness       Reason " for visit:  6 month follow up    He eats 2-3 servings of fruits and vegetables daily.He consumes 0 sweetened beverage(s) daily.He exercises with enough effort to increase his heart rate 9 or less minutes per day.  He exercises with enough effort to increase his heart rate 4 days per week.   He is taking medications regularly.                     Objective    BP (!) 171/80 (BP Location: Right arm, Patient Position: Sitting, Cuff Size: Adult Large)   Pulse 72   Wt 130.4 kg (287 lb 6.4 oz)   SpO2 97%   BMI 38.97 kg/m    Body mass index is 38.97 kg/m .  Physical Exam   GENERAL: alert and no distress  EYES: Eyes grossly normal to inspection, PERRL and conjunctivae and sclerae normal  HENT: ear canals and TM's normal, nose and mouth without ulcers or lesions  NECK: no adenopathy, no asymmetry, masses, or scars  RESP: lungs clear to auscultation - no rales, rhonchi or wheezes  CV: regular rate and rhythm, normal S1 S2, no S3 or S4, no murmur, click or rub  MS: no gross musculoskeletal defects noted, no edema  SKIN: Dime sized exophytic seborrheic keratoses noted on left lower back  NEURO: mentation intact and speech normal  PSYCH: mentation appears normal, affect normal/bright          Mikayla Morel, MS3    I, Shay Kirkpatrick, was present with the medical student who participated in the service and in the documentation of the note.  I have verified the history and personally performed the physical exam and medical decision making.  I agree with the assessment and plan of care as documented in the note.    Shay Kirkpatrick MD, FACP  Signed Electronically by: Shay Kirkpatrick MD

## 2024-08-28 DIAGNOSIS — E78.00 HYPERCHOLESTEROLEMIA: ICD-10-CM

## 2024-08-28 DIAGNOSIS — N18.31 CHRONIC KIDNEY DISEASE, STAGE 3A (H): ICD-10-CM

## 2024-08-28 DIAGNOSIS — E11.42 TYPE 2 DIABETES MELLITUS WITH DIABETIC POLYNEUROPATHY, WITH LONG-TERM CURRENT USE OF INSULIN (H): Primary | ICD-10-CM

## 2024-08-28 DIAGNOSIS — Z79.4 TYPE 2 DIABETES MELLITUS WITH DIABETIC POLYNEUROPATHY, WITH LONG-TERM CURRENT USE OF INSULIN (H): Primary | ICD-10-CM

## 2024-08-30 ENCOUNTER — OFFICE VISIT (OUTPATIENT)
Dept: DERMATOLOGY | Facility: CLINIC | Age: 59
End: 2024-08-30
Payer: COMMERCIAL

## 2024-08-30 DIAGNOSIS — D49.2 NEOPLASM OF UNSPECIFIED BEHAVIOR OF BONE, SOFT TISSUE, AND SKIN: ICD-10-CM

## 2024-08-30 DIAGNOSIS — L81.4 LENTIGINES: ICD-10-CM

## 2024-08-30 DIAGNOSIS — Z12.83 ENCOUNTER FOR SCREENING FOR MALIGNANT NEOPLASM OF SKIN: ICD-10-CM

## 2024-08-30 DIAGNOSIS — L82.1 SEBORRHEIC KERATOSES: Primary | ICD-10-CM

## 2024-08-30 DIAGNOSIS — L82.0 INFLAMED SEBORRHEIC KERATOSIS: ICD-10-CM

## 2024-08-30 DIAGNOSIS — D18.01 CHERRY ANGIOMA: ICD-10-CM

## 2024-08-30 DIAGNOSIS — D22.9 MULTIPLE NEVI: ICD-10-CM

## 2024-08-30 PROCEDURE — 11102 TANGNTL BX SKIN SINGLE LES: CPT | Mod: XS | Performed by: PHYSICIAN ASSISTANT

## 2024-08-30 PROCEDURE — 88305 TISSUE EXAM BY PATHOLOGIST: CPT | Mod: TC | Performed by: PHYSICIAN ASSISTANT

## 2024-08-30 PROCEDURE — 88305 TISSUE EXAM BY PATHOLOGIST: CPT | Mod: 26 | Performed by: DERMATOLOGY

## 2024-08-30 PROCEDURE — 17110 DESTRUCTION B9 LES UP TO 14: CPT | Performed by: PHYSICIAN ASSISTANT

## 2024-08-30 PROCEDURE — 99213 OFFICE O/P EST LOW 20 MIN: CPT | Mod: 25 | Performed by: PHYSICIAN ASSISTANT

## 2024-08-30 ASSESSMENT — PAIN SCALES - GENERAL: PAINLEVEL: EXTREME PAIN (8)

## 2024-08-30 NOTE — LETTER
8/30/2024       RE: Camacho Bhagat  [address]  [facility] [address]     Dear Colleague,    Thank you for referring your patient, Camacho Bhagat, to the [facility] DERMATOLOGY [facility] [place] at [facility]. Please see a copy of my visit note below.    [facility] Dermatology Note  Encounter Date: Aug 30, 2024  Office Visit     Reviewed patients past medical history and pertinent chart review prior to patients visit today.     Dermatology Problem List:  1. History of liver transplant 3/4/17  -tacrolimus, MMF, prednisone  SH: Works as a Location.     # NUB, left lower back, shave biopsy 8/30/2024   ____________________________________________    Assessment & Plan:     # Inflamed seborrheic keratoses x1  The benign nature of the skin lesion(s) was discussed with the patient.  Due to the inflamed and irritated nature of the lesions I recommend cryotherapy and the patient was agreeable.      Cryotherapy procedure note, location(s): right forehead x1 After verbal consent and discussion of risks and benefits including, but not limited to, dyspigmentation/scar, blister, and pain, the lesion(s) was(were) treated with 1-2 mm freeze border for 1-2 cycles with liquid nitrogen. Post cryotherapy instructions were provided.    # Neoplasm of uncertain behavior:  left lower back  DDx includes SCC vs BCC vs other. Shave biopsy today.    Procedure Note: Biopsy by shave technique  The risks and benefits of the procedure were described to the patient. These include but are not limited to bleeding, infection, scar, incomplete removal, and non-diagnostic biopsy. Verbal informed consent was obtained. The above site(s) was cleansed with an alcohol pad and injected with 1% lidocaine with epinephrine. Once anesthesia was obtained, a biopsy(ies) was performed with Gilette blade. The tissue(s) was placed in a labeled container(s) with formalin and sent to pathology.  Hemostasis was achieved with aluminum chloride. Vaseline and a bandage were applied to the wound(s). The patient tolerated the procedure well and was given post biopsy care instructions.    # Chronic immunosuppression  # Multiple nevi, trunk and extremities  # Solar lentigines  - No concerning features on dermoscopy. We discussed the importance of self exams at home.   - ABCDEs: Counseled ABCDEs of melanoma: Asymmetry, Border (irregularity), Color (not uniform, changes in color), Diameter (greater than 6 mm which is about the size of a pencil eraser), and Evolving (any changes in preexisting moles).  - Sun protection: Counseled SPF 30+ sunscreen, UPF clothing, sun avoidance, tanning bed avoidance.    # Cherry angiomas  # Seborrheic keratoses  - We discussed the benign nature of the skin lesions. No treatment required. Continued observation recommended. Follow up with any concerns.      Follow-up:  Annual for follow up full body skin exam, as needed for new or changing lesions or new concerns    All risks, benefits and alternatives were discussed with patient.  Patient is in agreement and understands the assessment and plan.  All questions were answered.  Felicia Singh PA-C  [facility] Dermatology    ____________________________________________    CC: Derm Problem (FBSE- spot on back )    HPI:  Mr. Camacho Bhagat is a(n) 58 year old male who presents today as a return patient for a full body skin cancer screening. The patient has a history of liver transplant, chronically immunosuppressed. The patient denies a personal history of skin cancer. The patient requests evaluation of a lesion on the left lower back. This has been growing over time. No other specific cutaneous concerns today. The patient reports trying to be diligent with photoprotection.      Physical Exam:  Vitals: There were no vitals taken for this visit.  SKIN: Total skin excluding the genitalia areas was performed. The exam included the scalp,  face, neck, bilateral arms, chest, back, abdomen, bilateral legs, digits, mons pubis, buttocks, and nails.   - Pineda II.  - The left lower back demonstrates a 3 cm crusted nodule.   - The right forehead demonstrates waxy papule(s) with an erythematous base, consistent with inflamed seborrheic keratoses.   - Multiple tan/brown macules and papules scattered throughout exam, consistent with benign nevi. No concerning features on dermoscopy.   - Scattered tan, homogenous macules scattered on sun exposed skin, consistent with solar lentigines.   - Scattered waxy, stuck on appearing papules and patches, consistent with seborrheic keratoses.    - Several 1-2 mm red dome shaped symmetric papules, consistent with cherry angiomas.     Medications:  Current Outpatient Medications   Medication Sig Dispense Refill     alcohol swab prep pads Use to swab area of injection/francisca as directed. 100 each 3     alpha-lipoic acid 600 MG capsule Take 600 mg by mouth       amLODIPine (NORVASC) 10 MG tablet Take 1 tablet (10 mg) by mouth daily 90 tablet 3     augmented betamethasone dipropionate (DIPROLENE-AF) 0.05 % external ointment Apply twice daily as needed for rash on the legs 45 g 11     cholecalciferol (VITAMIN D3) 1000 UNIT tablet Take 1 tablet (1,000 Units) by mouth daily (Patient taking differently: Take 1,000 Units by mouth every morning.) 100 tablet 3     Continuous Blood Gluc Sensor (DEXCOM G6 SENSOR) MISC Change every 10 days. 9 each 3     Continuous Blood Gluc Transmit (DEXCOM G6 TRANSMITTER) MISC Change every 3 months. 1 each 3     cyclobenzaprine (FLEXERIL) 10 MG tablet Take 1 tablet (10 mg) by mouth 3 times daily as needed for muscle spasms 90 tablet 3     doxazosin (CARDURA) 8 MG tablet Take 1 tablet (8 mg) by mouth at bedtime Take a total of 10 mg at bedtime 90 tablet 3     empagliflozin (JARDIANCE) 10 MG TABS tablet Take 1 tablet (10 mg) by mouth daily 90 tablet 1     fluticasone (FLONASE) 50 MCG/ACT nasal spray  Spray 1 spray into both nostrils daily 20 mL 3     furosemide (LASIX) 40 MG tablet Take 1 tablet (40 mg) by mouth daily . 90 tablet 3     gabapentin (NEURONTIN) 800 MG tablet Take 1 tablet (800 mg) by mouth 3 times daily 90 tablet 11     hydrOXYzine (ATARAX) 25 MG tablet Take 1 tablet (25 mg) by mouth 3 times daily as needed for itching 90 tablet 3     Insulin Disposable Pump (OMNIPOD 5 G6 INTRO, GEN 5,) KIT 1 kit daily 1 kit 0     Insulin Disposable Pump (OMNIPOD 5 G6 POD, GEN 5,) MISC 1 each See Admin Instructions Change every 2 days. Use for insulin administration. 45 each 3     Insulin Disposable Pump (OMNIPOD 5 G6 POD, GEN 5,) MISC 1 each See Admin Instructions Use per 's instructions. 1 each 1     Insulin Lispro (HUMALOG KWIKPEN) 200 UNIT/ML soln To be used in the insulin pump. Total daily dose up to 170 U. 72 mL 3     insulin pen needle (BD ENE U/F) 32G X 4 MM miscellaneous Use 1 pen needle 4 times daily or as directed. 400 each 1     ketorolac (ACULAR) 0.5 % ophthalmic solution Place 1 drop into the right eye 4 times daily Start 2 days prior to surgery four times a day, after surgery: Four times a day x 1 week, three times a day x 1 week, twice a day x 1 week, daily x 1 week then stop 10 mL 0     lidocaine (LIDODERM) 5 % patch Place 1 patch onto the skin every 24 hours To prevent lidocaine toxicity, patient should be patch free for 12 hrs daily. 30 patch 11     lidocaine (XYLOCAINE) 5 % external ointment Apply topically as needed for moderate pain 90 g 11     losartan (COZAAR) 25 MG tablet Take 1 tablet (25 mg) by mouth daily 90 tablet 3     methocarbamol (ROBAXIN) 500 MG tablet Take 1 tablet (500 mg) by mouth 2 times daily as needed for muscle spasms 60 tablet 0     metoprolol succinate ER (TOPROL XL) 200 MG 24 hr tablet Take 1 tablet (200 mg) by mouth daily 90 tablet 3     moxifloxacin (VIGAMOX) 0.5 % ophthalmic solution Place 1 drop into the right eye 4 times daily Start 2 days prior to  surgery four times a day, after surgery: Four times a day x 1 week 3 mL 0     mycophenolic acid (GENERIC EQUIVALENT) 360 MG EC tablet Take 2 tablets (720 mg) by mouth every 12 hours 360 tablet 3     oxyCODONE (ROXICODONE) 5 MG tablet Take 1 tablet (5 mg) by mouth 4 times daily as needed for pain maximum 6 tablet(s) per day 30 tablet 0     prednisoLONE acetate (PRED FORTE) 1 % ophthalmic suspension Place 1-2 drops into the right eye 4 times daily after surgery: Four times a day x 1 week, three times a day x 1 week, twice a day x 1 week, daily x 1 week then stop 10 mL 0     predniSONE (DELTASONE) 2.5 MG tablet Take 1 tablet (2.5 mg) by mouth daily 90 tablet 3     sildenafil (REVATIO) 20 MG tablet Take 2 tablets (40 mg) by mouth daily as needed (erectile dysfunction) 10 tablet 11     tacrolimus (GENERIC EQUIVALENT) 1 MG capsule Take 4 capsules (4 mg) by mouth every morning AND 3 capsules (3 mg) every evening. 630 capsule 3     tirzepatide (MOUNJARO) 2.5 MG/0.5ML pen Inject 2.5 mg Subcutaneous every 7 days 2 mL 1     tirzepatide (MOUNJARO) 5 MG/0.5ML pen Inject 5 mg subcutaneously every 7 days. 2 mL 2     tretinoin (RETIN-A) 0.025 % external cream Apply a pea-sized amount evenly over the face at nighttime before bed 45 g 11     triamcinolone (KENALOG) 0.1 % external ointment Apply topically 2 times daily to the itching on the legs 80 g 1     ursodiol (ACTIGALL) 500 MG tablet Take 1 tablet (500 mg) by mouth 2 times daily 180 tablet 3     No current facility-administered medications for this visit.      Past Medical History:   Patient Active Problem List   Diagnosis     Carpal tunnel syndrome     Testicular hypofunction     Sicca syndrome (H24)     Hepatocellular carcinoma (H)     Osteoarthritis     Rheumatoid arthritis (H)     Hyperkalemia     Liver transplant recipient (H)     Immunosuppressed status (H24)     Neutropenic colitis  (H24)     S/P liver transplant (H)     Pancytopenia (H)     Ischemia of both lower  extremities     Elevated serum creatinine     Abscess, intra-abdominal, postoperative     Liver transplant rejection (H)     CMV colitis (H)     Type 2 diabetes mellitus with diabetic polyneuropathy, with long-term current use of insulin (H)     Hypertension secondary to other renal disorders     Chronic kidney disease, stage 3 (H)     Class 3 severe obesity with serious comorbidity and body mass index (BMI) of 40.0 to 44.9 in adult (H)     F11.9 - Chronic, continuous use of opioids     Fibromyalgia     Combined forms of age-related cataract of both eyes     Past Medical History:   Diagnosis Date     Cancer (H) 12/15    Stage 4 liver cancer     Diabetes type 2, controlled (H) 11/10/2016     Esophageal reflux      Fibromyalgia 01/2009    dx with Dr Benitez( Rheum)     Gangrene of finger (H) 08/25/2017     H/O deep venous thrombosis 11/2001    Permanent IVC filter in place.     H/O CASTAÑEDA (nonalcoholic steatohepatitis)      H/O Pneumonia, organism unspecified(486) 10/2001    Included ARDS, sepsis, and  acute renal failure; hospitalized, required tracheostomy placement.     H/O: HTN (hypertension) 11/2001    No longer prescribed antihypertensive medication.       History of hepatocellular carcinoma      History of liver transplant (H)      History of obstructive sleep apnea     No longer wears CPAP since losing approximately 200 pounds with his liver transplant and its complications.       HLD (hyperlipidemia)      Hypertension      Ischemia of both lower extremities 08/25/2017    Distal ischemia due to shock/high pressor requirements     Liver transplant rejection (H) 06/11/2018     Neutropenic colitis  (H24) 07/04/2017     Osteoarthritis      Presence of PERMANENT IVC filter      Rheumatoid arthritis(714.0)     remission for many years       CC Referred Self, MD  No address on file on close of this encounter.    Lidocaine-epinephrine 1-1:222492 % injection   1.5mL once for one use, starting 8/30/2024 ending 8/30/2024,   2mL disp, R-0, injection  Injected by MARTHA Milner      Again, thank you for allowing me to participate in the care of your patient.      Sincerely,    Felicia Singh PA-C

## 2024-08-30 NOTE — NURSING NOTE
Dermatology Rooming Note    Camacho Bhagat's goals for this visit include:   Chief Complaint   Patient presents with    Derm Problem     FBSE- spot on back      MARTHA Milner

## 2024-08-30 NOTE — PATIENT INSTRUCTIONS
Patient Education        Proper skin care from [facility]:     -Eliminate harsh soaps as they strip the natural oils from the skin, often resulting in dry itchy skin ( i.e. Dial, Zest, [place])  -Use mild soaps such as Cetaphil or Dove Sensitive Skin in the shower. You do not need to use soap on arms, legs, and trunk every time you shower unless visibly soiled.   -Avoid hot or cold showers.  -After showering, lightly dry off and apply moisturizing within 2-3 minutes. This will help trap moisture in the skin.   -Aggressive use of a moisturizer at least 1-2 times a day to the entire body (including -Vanicream, Cetaphil, Aquaphor or Cerave) and moisturize hands after every washing.  -We recommend using moisturizers that come in a tub that needs to be scooped out, not a pump. This has more of an oil base. It will hold moisture in your skin much better than a water base moisturizer. The above recommended are non-pore clogging.        Wear a sunscreen with at least SPF 30 on your face, ears, neck and V of the chest daily. Wear sunscreen on other areas of the body if those areas are exposed to the sun throughout the day. Sunscreens can contain physical and/or chemical blockers. Physical blockers are less likely to clog pores, these include zinc oxide and titanium dioxide. Reapply every two hour and after swimming.      Sunscreen examples: https://www.ewg.org/sunscreen/     UV radiation  UVA radiation remains constant throughout the day and throughout the year. It is a longer wavelength than UVB and therefore penetrates deeper into the skin leading to immediate and delayed tanning, photoaging, and skin cancer. 70-80% of UVA and UVB radiation occurs between the hours of 10am-2pm.  UVB radiation  UVB radiation causes the most harmful effects and is more significant during the summer months. However, snow and ice can reflect UVB radiation leading to skin damage during the winter months as well. UVB radiation is  responsible for tanning, burning, inflammation, delayed erythema (pinkness), pigmentation (brown spots), and skin cancer.      I recommend self monthly full body exams and yearly full body exams with a dermatology provider. If you develop a new or changing lesion please follow up for examination. Most skin cancers are pink and scaly or pink and pearly. However, we do see blue/brown/black skin cancers.  Consider the ABCDEs of melanoma when giving yourself your monthly full body exam ( don't forget the groin, buttocks, feet, toes, etc). A-asymmetry, B-borders, C-color, D-diameter, E-elevation or evolving. If you see any of these changes please follow up in clinic. If you cannot see your back I recommend purchasing a hand held mirror to use with a larger wall mirror.       Checking for Skin Cancer  You can find cancer early by checking your skin each month. There are 3 kinds of skin cancer. They are melanoma, basal cell carcinoma, and squamous cell carcinoma. Doing monthly skin checks is the best way to find new marks or skin changes. Follow the instructions below for checking your skin.   The ABCDEs of checking moles for melanoma   Check your moles or growths for signs of melanoma using ABCDE:   Asymmetry: the sides of the mole or growth don t match  Border: the edges are ragged, notched, or blurred  Color: the color within the mole or growth varies  Diameter: the mole or growth is larger than 6 mm (size of a pencil eraser)  Evolving: the size, shape, or color of the mole or growth is changing (evolving is not shown in the images below)    Checking for other types of skin cancer  Basal cell carcinoma or squamous cell carcinoma have symptoms such as:      A spot or mole that looks different from all other marks on your skin  Changes in how an area feels, such as itching, tenderness, or pain  Changes in the skin's surface, such as oozing, bleeding, or scaliness  A sore that does not heal  New swelling or redness beyond  the border of a mole     Who s at risk?  Anyone can get skin cancer. But you are at greater risk if you have:   Fair skin, light-colored hair, or light-colored eyes  Many moles or abnormal moles on your skin  A history of sunburns from sunlight or tanning beds  A family history of skin cancer  A history of exposure to radiation or chemicals  A weakened immune system  If you have had skin cancer in the past, you are at risk for recurring skin cancer.   How to check your skin  Do your monthly skin checkups in front of a full-length mirror. Check all parts of your body, including your:   Head (ears, face, neck, and scalp)  Torso (front, back, and sides)  Arms (tops, undersides, upper, and lower armpits)  Hands (palms, backs, and fingers, including under the nails)  Buttocks and genitals  Legs (front, back, and sides)  Feet (tops, soles, toes, including under the nails, and between toes)  If you have a lot of moles, take digital photos of them each month. Make sure to take photos both up close and from a distance. These can help you see if any moles change over time.   Most skin changes are not cancer. But if you see any changes in your skin, call your doctor right away. Only he or she can diagnose a problem. If you have skin cancer, seeing your doctor can be the first step toward getting the treatment that could save your life.   [employer] last reviewed this educational content on 4/1/2019 2000-2020 [employer]. [address] [place] [address], [place] [address]. All rights reserved. This information is not intended as a substitute for professional medical care. Always follow your healthcare professional's instructions.        When should I call my doctor?  If you are worsening or not improving, please, contact us or seek urgent care as noted below.      Who should I call with questions (adults)?  [facility] (adult and pediatric): [phone]  [facility]  [place] (adult): [phone]  [facility] ([place], [place], [place] and [place]) [phone]  For urgent needs outside of business hours call the [facility] at [phone] and ask for the dermatology resident on call to be paged  If this is a medical emergency and you are unable to reach an ER, Call 911        If you need a prescription refill, please contact your pharmacy. Refills are approved or denied by our Physicians during normal business hours, Monday through Fridays  Per office policy, refills will not be granted if you have not been seen within the past year (or sooner depending on your child's condition)

## 2024-08-30 NOTE — PROGRESS NOTES
Lidocaine-epinephrine 1-1:919793 % injection   1.5mL once for one use, starting 8/30/2024 ending 8/30/2024,  2mL disp, R-0, injection  Injected by MARTHA Milner

## 2024-08-30 NOTE — PROGRESS NOTES
[facility] Dermatology Note  Encounter Date: Aug 30, 2024  Office Visit     Reviewed patients past medical history and pertinent chart review prior to patients visit today.     Dermatology Problem List:  1. History of liver transplant 3/4/17  -tacrolimus, MMF, prednisone  SH: Works as a CMA.     # NUB, left lower back, shave biopsy 8/30/2024   ____________________________________________    Assessment & Plan:     # Inflamed seborrheic keratoses x1  The benign nature of the skin lesion(s) was discussed with the patient.  Due to the inflamed and irritated nature of the lesions I recommend cryotherapy and the patient was agreeable.      Cryotherapy procedure note, location(s): right forehead x1 After verbal consent and discussion of risks and benefits including, but not limited to, dyspigmentation/scar, blister, and pain, the lesion(s) was(were) treated with 1-2 mm freeze border for 1-2 cycles with liquid nitrogen. Post cryotherapy instructions were provided.    # Neoplasm of uncertain behavior:  left lower back  DDx includes SCC vs BCC vs other. Shave biopsy today.    Procedure Note: Biopsy by shave technique  The risks and benefits of the procedure were described to the patient. These include but are not limited to bleeding, infection, scar, incomplete removal, and non-diagnostic biopsy. Verbal informed consent was obtained. The above site(s) was cleansed with an alcohol pad and injected with 1% lidocaine with epinephrine. Once anesthesia was obtained, a biopsy(ies) was performed with Gilette blade. The tissue(s) was placed in a labeled container(s) with formalin and sent to pathology. Hemostasis was achieved with aluminum chloride. Vaseline and a bandage were applied to the wound(s). The patient tolerated the procedure well and was given post biopsy care instructions.    # Chronic immunosuppression  # Multiple nevi, trunk and extremities  # Solar lentigines  - No concerning features on  dermoscopy. We discussed the importance of self exams at home.   - ABCDEs: Counseled ABCDEs of melanoma: Asymmetry, Border (irregularity), Color (not uniform, changes in color), Diameter (greater than 6 mm which is about the size of a pencil eraser), and Evolving (any changes in preexisting moles).  - Sun protection: Counseled SPF 30+ sunscreen, UPF clothing, sun avoidance, tanning bed avoidance.    # Cherry angiomas  # Seborrheic keratoses  - We discussed the benign nature of the skin lesions. No treatment required. Continued observation recommended. Follow up with any concerns.      Follow-up:  Annual for follow up full body skin exam, as needed for new or changing lesions or new concerns    All risks, benefits and alternatives were discussed with patient.  Patient is in agreement and understands the assessment and plan.  All questions were answered.  Felicia Singh PA-C  [facility] Dermatology    ____________________________________________    CC: Derm Problem (FBSE- spot on back )    HPI:  Mr. Camacho Bhagat is a(n) 58 year old male who presents today as a return patient for a full body skin cancer screening. The patient has a history of liver transplant, chronically immunosuppressed. The patient denies a personal history of skin cancer. The patient requests evaluation of a lesion on the left lower back. This has been growing over time. No other specific cutaneous concerns today. The patient reports trying to be diligent with photoprotection.      Physical Exam:  Vitals: There were no vitals taken for this visit.  SKIN: Total skin excluding the genitalia areas was performed. The exam included the scalp, face, neck, bilateral arms, chest, back, abdomen, bilateral legs, digits, mons pubis, buttocks, and nails.   - Pineda II.  - The left lower back demonstrates a 3 cm crusted nodule.   - The right forehead demonstrates waxy papule(s) with an erythematous base, consistent with inflamed seborrheic  keratoses.   - Multiple tan/brown macules and papules scattered throughout exam, consistent with benign nevi. No concerning features on dermoscopy.   - Scattered tan, homogenous macules scattered on sun exposed skin, consistent with solar lentigines.   - Scattered waxy, stuck on appearing papules and patches, consistent with seborrheic keratoses.    - Several 1-2 mm red dome shaped symmetric papules, consistent with cherry angiomas.     Medications:  Current Outpatient Medications   Medication Sig Dispense Refill    alcohol swab prep pads Use to swab area of injection/francisca as directed. 100 each 3    alpha-lipoic acid 600 MG capsule Take 600 mg by mouth      amLODIPine (NORVASC) 10 MG tablet Take 1 tablet (10 mg) by mouth daily 90 tablet 3    augmented betamethasone dipropionate (DIPROLENE-AF) 0.05 % external ointment Apply twice daily as needed for rash on the legs 45 g 11    cholecalciferol (VITAMIN D3) 1000 UNIT tablet Take 1 tablet (1,000 Units) by mouth daily (Patient taking differently: Take 1,000 Units by mouth every morning.) 100 tablet 3    Continuous Blood Gluc Sensor (DEXCOM G6 SENSOR) MISC Change every 10 days. 9 each 3    Continuous Blood Gluc Transmit (DEXCOM G6 TRANSMITTER) MISC Change every 3 months. 1 each 3    cyclobenzaprine (FLEXERIL) 10 MG tablet Take 1 tablet (10 mg) by mouth 3 times daily as needed for muscle spasms 90 tablet 3    doxazosin (CARDURA) 8 MG tablet Take 1 tablet (8 mg) by mouth at bedtime Take a total of 10 mg at bedtime 90 tablet 3    empagliflozin (JARDIANCE) 10 MG TABS tablet Take 1 tablet (10 mg) by mouth daily 90 tablet 1    fluticasone (FLONASE) 50 MCG/ACT nasal spray Spray 1 spray into both nostrils daily 20 mL 3    furosemide (LASIX) 40 MG tablet Take 1 tablet (40 mg) by mouth daily . 90 tablet 3    gabapentin (NEURONTIN) 800 MG tablet Take 1 tablet (800 mg) by mouth 3 times daily 90 tablet 11    hydrOXYzine (ATARAX) 25 MG tablet Take 1 tablet (25 mg) by mouth 3 times  daily as needed for itching 90 tablet 3    Insulin Disposable Pump (OMNIPOD 5 G6 INTRO, GEN 5,) KIT 1 kit daily 1 kit 0    Insulin Disposable Pump (OMNIPOD 5 G6 POD, GEN 5,) MISC 1 each See Admin Instructions Change every 2 days. Use for insulin administration. 45 each 3    Insulin Disposable Pump (OMNIPOD 5 G6 POD, GEN 5,) MISC 1 each See Admin Instructions Use per 's instructions. 1 each 1    Insulin Lispro (HUMALOG KWIKPEN) 200 UNIT/ML soln To be used in the insulin pump. Total daily dose up to 170 U. 72 mL 3    insulin pen needle (BD ENE U/F) 32G X 4 MM miscellaneous Use 1 pen needle 4 times daily or as directed. 400 each 1    ketorolac (ACULAR) 0.5 % ophthalmic solution Place 1 drop into the right eye 4 times daily Start 2 days prior to surgery four times a day, after surgery: Four times a day x 1 week, three times a day x 1 week, twice a day x 1 week, daily x 1 week then stop 10 mL 0    lidocaine (LIDODERM) 5 % patch Place 1 patch onto the skin every 24 hours To prevent lidocaine toxicity, patient should be patch free for 12 hrs daily. 30 patch 11    lidocaine (XYLOCAINE) 5 % external ointment Apply topically as needed for moderate pain 90 g 11    losartan (COZAAR) 25 MG tablet Take 1 tablet (25 mg) by mouth daily 90 tablet 3    methocarbamol (ROBAXIN) 500 MG tablet Take 1 tablet (500 mg) by mouth 2 times daily as needed for muscle spasms 60 tablet 0    metoprolol succinate ER (TOPROL XL) 200 MG 24 hr tablet Take 1 tablet (200 mg) by mouth daily 90 tablet 3    moxifloxacin (VIGAMOX) 0.5 % ophthalmic solution Place 1 drop into the right eye 4 times daily Start 2 days prior to surgery four times a day, after surgery: Four times a day x 1 week 3 mL 0    mycophenolic acid (GENERIC EQUIVALENT) 360 MG EC tablet Take 2 tablets (720 mg) by mouth every 12 hours 360 tablet 3    oxyCODONE (ROXICODONE) 5 MG tablet Take 1 tablet (5 mg) by mouth 4 times daily as needed for pain maximum 6 tablet(s) per day 30  tablet 0    prednisoLONE acetate (PRED FORTE) 1 % ophthalmic suspension Place 1-2 drops into the right eye 4 times daily after surgery: Four times a day x 1 week, three times a day x 1 week, twice a day x 1 week, daily x 1 week then stop 10 mL 0    predniSONE (DELTASONE) 2.5 MG tablet Take 1 tablet (2.5 mg) by mouth daily 90 tablet 3    sildenafil (REVATIO) 20 MG tablet Take 2 tablets (40 mg) by mouth daily as needed (erectile dysfunction) 10 tablet 11    tacrolimus (GENERIC EQUIVALENT) 1 MG capsule Take 4 capsules (4 mg) by mouth every morning AND 3 capsules (3 mg) every evening. 630 capsule 3    tirzepatide (MOUNJARO) 2.5 MG/0.5ML pen Inject 2.5 mg Subcutaneous every 7 days 2 mL 1    tirzepatide (MOUNJARO) 5 MG/0.5ML pen Inject 5 mg subcutaneously every 7 days. 2 mL 2    tretinoin (RETIN-A) 0.025 % external cream Apply a pea-sized amount evenly over the face at nighttime before bed 45 g 11    triamcinolone (KENALOG) 0.1 % external ointment Apply topically 2 times daily to the itching on the legs 80 g 1    ursodiol (ACTIGALL) 500 MG tablet Take 1 tablet (500 mg) by mouth 2 times daily 180 tablet 3     No current facility-administered medications for this visit.      Past Medical History:   Patient Active Problem List   Diagnosis    Carpal tunnel syndrome    Testicular hypofunction    Sicca syndrome (H24)    Hepatocellular carcinoma (H)    Osteoarthritis    Rheumatoid arthritis (H)    Hyperkalemia    Liver transplant recipient (H)    Immunosuppressed status (H24)    Neutropenic colitis  (H24)    S/P liver transplant (H)    Pancytopenia (H)    Ischemia of both lower extremities    Elevated serum creatinine    Abscess, intra-abdominal, postoperative    Liver transplant rejection (H)    CMV colitis (H)    Type 2 diabetes mellitus with diabetic polyneuropathy, with long-term current use of insulin (H)    Hypertension secondary to other renal disorders    Chronic kidney disease, stage 3 (H)    Class 3 severe obesity with  serious comorbidity and body mass index (BMI) of 40.0 to 44.9 in adult (H)    F11.9 - Chronic, continuous use of opioids    Fibromyalgia    Combined forms of age-related cataract of both eyes     Past Medical History:   Diagnosis Date    Cancer (H) 12/15    Stage 4 liver cancer    Diabetes type 2, controlled (H) 11/10/2016    Esophageal reflux     Fibromyalgia 01/2009    dx with Dr Benitez( Rheum)    Gangrene of finger (H) 08/25/2017    H/O deep venous thrombosis 11/2001    Permanent IVC filter in place.    H/O CASTAÑEDA (nonalcoholic steatohepatitis)     H/O Pneumonia, organism unspecified(486) 10/2001    Included ARDS, sepsis, and  acute renal failure; hospitalized, required tracheostomy placement.    H/O: HTN (hypertension) 11/2001    No longer prescribed antihypertensive medication.      History of hepatocellular carcinoma     History of liver transplant (H)     History of obstructive sleep apnea     No longer wears CPAP since losing approximately 200 pounds with his liver transplant and its complications.      HLD (hyperlipidemia)     Hypertension     Ischemia of both lower extremities 08/25/2017    Distal ischemia due to shock/high pressor requirements    Liver transplant rejection (H) 06/11/2018    Neutropenic colitis  (H24) 07/04/2017    Osteoarthritis     Presence of PERMANENT IVC filter     Rheumatoid arthritis(714.0)     remission for many years       CC Referred Self, MD  No address on file on close of this encounter.

## 2024-09-04 LAB
PATH REPORT.COMMENTS IMP SPEC: ABNORMAL
PATH REPORT.COMMENTS IMP SPEC: ABNORMAL
PATH REPORT.COMMENTS IMP SPEC: YES
PATH REPORT.FINAL DX SPEC: ABNORMAL
PATH REPORT.GROSS SPEC: ABNORMAL
PATH REPORT.MICROSCOPIC SPEC OTHER STN: ABNORMAL
PATH REPORT.RELEVANT HX SPEC: ABNORMAL

## 2024-09-06 ENCOUNTER — TELEPHONE (OUTPATIENT)
Dept: DERMATOLOGY | Facility: CLINIC | Age: 59
End: 2024-09-06
Payer: COMMERCIAL

## 2024-09-06 DIAGNOSIS — C44.92 SCC (SQUAMOUS CELL CARCINOMA): Primary | ICD-10-CM

## 2024-09-06 NOTE — TELEPHONE ENCOUNTER
Felicia Singh PA-C  9/5/2024  7:03 AM CDT       SCCIS, needs Mohs. Please place order and call patient to schedule. Thank you!

## 2024-09-06 NOTE — TELEPHONE ENCOUNTER
Called patient to schedule surgery with Dr. Gilbert    Date of Surgery: 10/23    Surgery type: mohs    Consult scheduled: Yes    Has patient had mohs with us before? No    Additional comments: prefers M or W appts. Would like sooner.       Lizzeth Yi on 9/6/2024 at 2:03 PM

## 2024-09-09 ENCOUNTER — OFFICE VISIT (OUTPATIENT)
Dept: CARDIOLOGY | Facility: CLINIC | Age: 59
End: 2024-09-09
Attending: INTERNAL MEDICINE
Payer: COMMERCIAL

## 2024-09-09 VITALS
WEIGHT: 288.3 LBS | HEART RATE: 71 BPM | OXYGEN SATURATION: 99 % | DIASTOLIC BLOOD PRESSURE: 84 MMHG | BODY MASS INDEX: 39.09 KG/M2 | SYSTOLIC BLOOD PRESSURE: 146 MMHG

## 2024-09-09 DIAGNOSIS — I15.1 HYPERTENSION SECONDARY TO OTHER RENAL DISORDERS: Primary | ICD-10-CM

## 2024-09-09 DIAGNOSIS — I10 ESSENTIAL HYPERTENSION: ICD-10-CM

## 2024-09-09 DIAGNOSIS — N18.31 STAGE 3A CHRONIC KIDNEY DISEASE (H): ICD-10-CM

## 2024-09-09 PROCEDURE — 99204 OFFICE O/P NEW MOD 45 MIN: CPT | Mod: 24 | Performed by: INTERNAL MEDICINE

## 2024-09-09 PROCEDURE — 99213 OFFICE O/P EST LOW 20 MIN: CPT | Performed by: INTERNAL MEDICINE

## 2024-09-09 ASSESSMENT — PAIN SCALES - GENERAL: PAINLEVEL: NO PAIN (0)

## 2024-09-09 NOTE — NURSING NOTE
Chief Complaint   Patient presents with    New Patient     September 9th, 2024- reason for visit: New referral from PCP for HTN       Vitals were taken and medications reconciled.    Mir Galarza, EMT  8:04 AM

## 2024-09-09 NOTE — PATIENT INSTRUCTIONS
Patient Instructions:  It was a pleasure to see you in the cardiology clinic today.      If you have any questions, you can reach my nurse, Vickie Timmons RN, at [phone].  Press Option #1 for the [facility], and then press Option #4 for nursing.    We are encouraging the use of "[employer]"hart to communicate with your HealthCare Provider    Medication Changes: Refill of jardiance     Recommendations/plan: Echo and labs     Please follow up: With Dr. Loving pending results     Sincerely,    Cory Loving MD     If you have an urgent need after hours (8:00 am to 4:30 pm) please call [phone] and ask for the cardiology fellow on call.

## 2024-09-09 NOTE — LETTER
9/9/2024      RE: Camacho Bhagat  [address]  [facility] [address]       Dear Colleague,    Thank you for the opportunity to participate in the care of your patient, Camacho Bhagat, at the [facility] [place] [facility]. Please see a copy of my visit note below.    HPI:    Camacho Bhagat is a 59-year-old man with multiple comorbidities including liver transplantation who was seen today for management of hypertension and cardiac evaluation. He is on Norvasc 10 g daily, Metoprolol 200 mg daily, Doxazosin 8 mg daily, and Losartan 25 mg daily. His Losartan was unable to be increased due to borderline high K levels. His blood pressure has been suboptimally controlled at times but better at other times. His blood pressure today was 146/84 mmHg. He denies any symptoms related to hypertension. He denies chest pain, dyspnea at rest, orthopnea, PND, pedal edema, but notes exertional dyspnea after significant exertion.    PAST MEDICAL HISTORY:  Past Medical History:   Diagnosis Date     Cancer (H) 12/15    Stage 4 liver cancer     Diabetes type 2, controlled (H) 11/10/2016     Esophageal reflux      Fibromyalgia 01/2009    dx with Dr Benitez( Rheum)     Gangrene of finger (H) 08/25/2017     H/O deep venous thrombosis 11/2001    Permanent IVC filter in place.     H/O CASTAÑEDA (nonalcoholic steatohepatitis)      H/O Pneumonia, organism unspecified(486) 10/2001    Included ARDS, sepsis, and  acute renal failure; hospitalized, required tracheostomy placement.     H/O: HTN (hypertension) 11/2001    No longer prescribed antihypertensive medication.       History of hepatocellular carcinoma      History of liver transplant (H)      History of obstructive sleep apnea     No longer wears CPAP since losing approximately 200 pounds with his liver transplant and its complications.       HLD (hyperlipidemia)      Hypertension      Ischemia of both lower extremities  08/25/2017    Distal ischemia due to shock/high pressor requirements     Liver transplant rejection (H) 06/11/2018     Neutropenic colitis (H) 07/04/2017     Osteoarthritis      Presence of PERMANENT IVC filter      Rheumatoid arthritis(714.0)     remission for many years     Squamous cell skin cancer     on back       CURRENT MEDICATIONS:  Current Outpatient Medications   Medication Sig Dispense Refill     alcohol swab prep pads Use to swab area of injection/francisca as directed. 100 each 3     alpha-lipoic acid 600 MG capsule Take 600 mg by mouth       amLODIPine (NORVASC) 10 MG tablet Take 1 tablet (10 mg) by mouth daily 90 tablet 3     augmented betamethasone dipropionate (DIPROLENE-AF) 0.05 % external ointment Apply twice daily as needed for rash on the legs 45 g 11     cholecalciferol (VITAMIN D3) 1000 UNIT tablet Take 1 tablet (1,000 Units) by mouth daily (Patient taking differently: Take 1,000 Units by mouth every morning.) 100 tablet 3     cyclobenzaprine (FLEXERIL) 10 MG tablet Take 1 tablet (10 mg) by mouth 3 times daily as needed for muscle spasms 90 tablet 3     doxazosin (CARDURA) 8 MG tablet Take 1 tablet (8 mg) by mouth at bedtime Take a total of 10 mg at bedtime 90 tablet 3     empagliflozin (JARDIANCE) 10 MG TABS tablet Take 1 tablet (10 mg) by mouth daily. 90 tablet 1     fluticasone (FLONASE) 50 MCG/ACT nasal spray Spray 1 spray into both nostrils daily 20 mL 3     furosemide (LASIX) 40 MG tablet Take 1 tablet (40 mg) by mouth daily . 90 tablet 3     gabapentin (NEURONTIN) 800 MG tablet Take 1 tablet (800 mg) by mouth 3 times daily 90 tablet 11     hydrOXYzine (ATARAX) 25 MG tablet Take 1 tablet (25 mg) by mouth 3 times daily as needed for itching 90 tablet 3     Insulin Disposable Pump (OMNIPOD 5 G6 INTRO, GEN 5,) KIT 1 kit daily 1 kit 0     Insulin Disposable Pump (OMNIPOD 5 G6 POD, GEN 5,) MISC 1 each See Admin Instructions Use per 's instructions. 1 each 1     insulin pen needle (BD  ENE U/F) 32G X 4 MM miscellaneous Use 1 pen needle 4 times daily or as directed. 400 each 1     lidocaine (LIDODERM) 5 % patch Place 1 patch onto the skin every 24 hours To prevent lidocaine toxicity, patient should be patch free for 12 hrs daily. 30 patch 11     lidocaine (XYLOCAINE) 5 % external ointment Apply topically as needed for moderate pain 90 g 11     losartan (COZAAR) 25 MG tablet Take 1 tablet (25 mg) by mouth daily 90 tablet 3     methocarbamol (ROBAXIN) 500 MG tablet Take 1 tablet (500 mg) by mouth 2 times daily as needed for muscle spasms 60 tablet 0     metoprolol succinate ER (TOPROL XL) 200 MG 24 hr tablet Take 1 tablet (200 mg) by mouth daily 90 tablet 3     mycophenolic acid (GENERIC EQUIVALENT) 360 MG EC tablet Take 2 tablets (720 mg) by mouth every 12 hours 360 tablet 3     oxyCODONE (ROXICODONE) 5 MG tablet Take 1 tablet (5 mg) by mouth 4 times daily as needed for pain maximum 6 tablet(s) per day 30 tablet 0     predniSONE (DELTASONE) 2.5 MG tablet Take 1 tablet (2.5 mg) by mouth daily 90 tablet 3     sildenafil (REVATIO) 20 MG tablet Take 2 tablets (40 mg) by mouth daily as needed (erectile dysfunction) 10 tablet 11     tacrolimus (GENERIC EQUIVALENT) 1 MG capsule Take 4 capsules (4 mg) by mouth every morning AND 3 capsules (3 mg) every evening. 630 capsule 3     tirzepatide (MOUNJARO) 2.5 MG/0.5ML pen Inject 2.5 mg Subcutaneous every 7 days 2 mL 1     tirzepatide (MOUNJARO) 5 MG/0.5ML pen Inject 5 mg subcutaneously every 7 days. 2 mL 2     tretinoin (RETIN-A) 0.025 % external cream Apply a pea-sized amount evenly over the face at nighttime before bed 45 g 11     triamcinolone (KENALOG) 0.1 % external ointment Apply topically 2 times daily to the itching on the legs 80 g 1     ursodiol (ACTIGALL) 500 MG tablet Take 1 tablet (500 mg) by mouth 2 times daily 180 tablet 3     cephALEXin (KEFLEX) 500 MG capsule Take 1 capsule (500 mg) by mouth 3 times daily. 30 capsule 0     Continuous Glucose  Sensor (DEXCOM G6 SENSOR) MISC Change every 10 days. 9 each 3     Continuous Glucose Transmitter (DEXCOM G6 TRANSMITTER) MISC Change every 3 months. 1 each 3     doxycycline hyclate (VIBRAMYCIN) 100 MG capsule Take 1 capsule (100 mg) by mouth 2 times daily. 10 capsule 0     Insulin Disposable Pump (OMNIPOD 5 PODS, GEN 5,) MISC 1 each See Admin Instructions. Change every 2 days. Use for insulin administration. 45 each 3     Insulin Lispro (HUMALOG KWIKPEN) 200 UNIT/ML soln To be used in the insulin pump. Total daily dose up to 170 U. 72 mL 3     tirzepatide (MOUNJARO) 7.5 MG/0.5ML pen Inject 7.5 mg subcutaneously once a week. 3 mL 0       PAST SURGICAL HISTORY:  Past Surgical History:   Procedure Laterality Date     ARTHROSCOPY KNEE WITH MENISCAL REPAIR Right 2008    Right knee arthroscopy     BENCH LIVER N/A 03/04/2017    Procedure: BENCH LIVER;  Surgeon: Jovan Tran MD;  Location: [facility] OR     BIOPSY  5/2017    Liver     CHOLECYSTECTOMY       COLONOSCOPY N/A 07/21/2017    Procedure: COMBINED COLONOSCOPY, SINGLE OR MULTIPLE BIOPSY/POLYPECTOMY BY BIOPSY;  Colonoscopy;  Surgeon: Izaiah Montes MD;  Location:  GI     COLONOSCOPY N/A 10/24/2022    Procedure: COLONOSCOPY, WITH POLYPECTOMY AND BIOPSY;  Surgeon: Abril Mcneill MD;  Location:  GI     ENDOSCOPIC RETROGRADE CHOLANGIOPANCREATOGRAM N/A 05/22/2018    Procedure: COMBINED ENDOSCOPIC RETROGRADE CHOLANGIOPANCREATOGRAPHY, PLACE TUBE/STENT;  Endoscopic Retrograde Cholangiopancreatography with sphincterotomy and pancreatic duct stent placement;  Surgeon: Zay Gaitan MD;  Location:  OR     ENT SURGERY      Repair of deviated septum     ESOPHAGOSCOPY, GASTROSCOPY, DUODENOSCOPY (EGD), COMBINED N/A 08/04/2016    Procedure: COMBINED ESOPHAGOSCOPY, GASTROSCOPY, DUODENOSCOPY (EGD), BIOPSY SINGLE OR MULTIPLE;  Surgeon: Trent Pederson MD;  Location:  GI     ESOPHAGOSCOPY, GASTROSCOPY, DUODENOSCOPY (EGD), COMBINED N/A 08/27/2017    Procedure:  COMBINED ESOPHAGOSCOPY, GASTROSCOPY, DUODENOSCOPY (EGD);;  Surgeon: Los Wynn MD;  Location: [facility] GI     ESOPHAGOSCOPY, GASTROSCOPY, DUODENOSCOPY (EGD), COMBINED N/A 08/17/2023    Procedure: Esophagoscopy, gastroscopy, duodenoscopy (EGD), combined;  Surgeon: Trent Villanueva MD;  Location: [facility] GI     INCISION AND DRAINAGE ABDOMEN WASHOUT, COMBINED Right 02/14/2018    Procedure: COMBINED INCISION AND DRAINAGE ABDOMEN WASHOUT;  COMBINED INCISION AND DRAINAGE right ABDOMEN flank;  Surgeon: Sara Dinh MD;  Location: [facility] OR     LAPAROTOMY EXPLORATORY N/A 07/04/2017    Procedure: LAPAROTOMY EXPLORATORY;  Exploratory Laparotomy, washout;  Surgeon: Tip Zhang MD;  Location: [facility]     LAPAROTOMY EXPLORATORY N/A 07/05/2017    Procedure: LAPAROTOMY EXPLORATORY;  Exploratory Laparotomy, Washout with closure.;  Surgeon: Tip Zhang MD;  Location: [facility]     LAPAROTOMY EXPLORATORY N/A 07/26/2017    Procedure: LAPAROTOMY EXPLORATORY;  Exploratory Laparotomy, Right angelique-colectomy, end ileostomy, mucosal fistula, partial omentectomy;  Surgeon: Sara Dinh MD;  Location: [facility] OR     LASIK       ORTHOPEDIC SURGERY Right     Repair of dislocated wrist.       PHACOEMULSIFICATION CLEAR CORNEA WITH STANDARD INTRAOCULAR LENS IMPLANT Right 2/1/2024    Procedure: RIGHT EYE PHACOEMULSIFICATION, COMPLEX CATARACT, WITH INTRAOCULAR LENS IMPLANT;  Surgeon: Stan Roque MD;  Location: [facility] OR     RELEASE TRIGGER FINGER Right 11/10/2017    Procedure: RELEASE TRIGGER FINGER;  Debride, V-Y Flap Right Index Finger;  Surgeon: Momo Noonan MD;  [facility]     TAKEDOWN ILEOSTOMY N/A 01/05/2018    Procedure: TAKEDOWN ILEOSTOMY;  Exploratory Laparotomy, Lysis of Adhesions, Takedown Of End Ileostomy, Takedown of mucocutaneous fistula, ileocolic resection  And Colorectal Anastomosis, Primary repair of Ventral Hernia, Anesthesia Block ;  Surgeon: Sara Dinh  MD;  Location: [facility]     TRACHEOSTOMY      During hospitalization for pneumonia.       TRANSPLANT LIVER RECIPIENT  DONOR N/A 2017    Procedure: TRANSPLANT LIVER RECIPIENT  DONOR;  Surgeon: Jovan Tran MD;  Location: [facility] OR       ALLERGIES     Allergies   Allergen Reactions     Erythromycin GI Disturbance     Losartan Other (See Comments)     Consistently develops hyperkalemia     Vioxx      Nausea, vomiting       FAMILY HISTORY:  Family History   Problem Relation Age of Onset     Arthritis Mother      Thyroid Cancer Mother         Survivor!     Thyroid Disease Mother         Thyroid cancer     Diabetes Father      Substance Abuse Father      Cervical Cancer Maternal Grandmother      Skin Cancer Maternal Grandfather      Cerebrovascular Disease Maternal Grandfather      No Known Problems Paternal Grandmother      Prostate Cancer Paternal Grandfather      Colon Cancer No family hx of      Hyperlipidemia No family hx of      Coronary Artery Disease No family hx of      Breast Cancer No family hx of      Glaucoma No family hx of      Hypertension No family hx of      Macular Degeneration No family hx of        SOCIAL HISTORY:  Social History     Socioeconomic History     Marital status:      Spouse name: None     Number of children: 1     Years of education: None     Highest education level: None   Occupational History     Occupation:     Tobacco Use     Smoking status: Former     Current packs/day: 0.00     Average packs/day: 0.5 packs/day for 1 year (0.5 ttl pk-yrs)     Types: Cigarettes, Cigars, Dip, chew, snus or snuff     Start date: 1987     Quit date: 1987     Years since quittin.3     Smokeless tobacco: Former     Types: Chew     Quit date: 10/8/2015     Tobacco comments:     1 Cigar once every other month.   Substance and Sexual Activity     Alcohol use: Not Currently     Comment: No alcohol since liver transplant.       Drug use: Never      Sexual activity: Yes     Partners: Female     Birth control/protection: None   Other Topics Concern     Parent/sibling w/ CABG, MI or angioplasty before 65F 55M? 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         Social Drivers of Health     Financial Resource Strain: Low Risk  (8/8/2024)    Financial Resource Strain      Within the past 12 months, have you or your family members you live with been unable to get utilities (heat, electricity) when it was really needed?: No   Food Insecurity: Low Risk  (8/8/2024)    Food Insecurity      Within the past 12 months, did you worry that your food would run out before you got money to buy more?: No      Within the past 12 months, did the food you bought just not last and you didn t have money to get more?: No   Transportation Needs: Low Risk  (8/8/2024)    Transportation Needs      Within the past 12 months, has lack of transportation kept you from medical appointments, getting your medicines, non-medical meetings or appointments, work, or from getting things that you need?: No   Interpersonal Safety: Low Risk  (1/24/2024)    Interpersonal Safety      Do you feel physically and emotionally safe where you currently live?: Yes      Within the past 12 months, have you been hit, slapped, kicked or otherwise physically hurt by someone?: No      Within the past 12 months, have you been humiliated or emotionally abused in other ways by your partner or ex-partner?: No   Housing Stability: Low Risk  (8/8/2024)    Housing Stability      Do you have housing? : Yes      Are you worried about losing your housing?: No       ROS:   Constitutional: No fever, chills, or sweats. No weight gain/loss   ENT: No visual disturbance, ear ache, epistaxis, sore throat  Allergies/Immunologic: Negative.   Respiratory: No cough, hemoptysia  Cardiovascular: As per HPI  GI: No nausea, vomiting, hematemesis, melena, or hematochezia  : No urinary frequency,  dysuria, or hematuria  Integument: Negative  Psychiatric: Negative  Neuro: Negative  Endocrinology: Negative   Musculoskeletal: Negative    EXAM:  BP (!) 146/84 (BP Location: Right arm, Patient Position: Chair, Cuff Size: Adult Large)   Pulse 71   Wt 130.8 kg (288 lb 4.8 oz)   SpO2 99%   BMI 39.09 kg/m    In general, the patient is a pleasant male in no apparent distress.    HEENT: NC/AT.  PERRLA.  EOMI.  Sclerae white, not injected.  Nares clear.  Pharynx without erythema or exudate.  Dentition intact.    Neck: No adenopathy.  No thyromegaly. Carotids +4/4 bilaterally without bruits.  No jugular venous distension.   Heart: RRR. Normal S1, S2 splits physiologically. No murmur, rub, click, or gallop. The PMI is in the 5th ICS in the midclavicular line. There is no heave.    Lungs: CTA.  No ronchi, wheezes, rales.  No dullness to percussion.   Abdomen: Soft, nontender, nondistended. No organomegaly.  No bruits.   Extremities: No clubbing, cyanosis, or edema.  The pulses are +4/4 at the radial, brachial, femoral, popliteal, DP, and PT sites bilaterally.  No bruits are noted.  Neurologic: Alert and oriented to person/place/time, normal speech, gait and affect  Skin: No petechiae, purpura or rash.    Labs:  LIPID RESULTS:  Lab Results   Component Value Date    CHOL 155 01/09/2024    CHOL 134 11/24/2020    HDL 41 01/09/2024    HDL 39 (L) 11/24/2020    LDL 77 01/09/2024    LDL 35 11/24/2020    TRIG 185 (H) 01/09/2024    TRIG 298 (H) 11/24/2020    CHOLHDLRATIO 3.8 07/03/2012    NHDL 114 01/09/2024    NHDL 95 11/24/2020       LIVER ENZYME RESULTS:  Lab Results   Component Value Date    AST 22 11/01/2024    AST 19 05/27/2021    ALT 16 11/01/2024    ALT 32 05/27/2021       CBC RESULTS:  Lab Results   Component Value Date    WBC 4.9 11/01/2024    WBC 6.9 05/27/2021    RBC 3.56 (L) 11/01/2024    RBC 3.83 (L) 05/27/2021    HGB 10.7 (L) 11/01/2024    HGB 11.9 (L) 05/27/2021    HCT 32.6 (L) 11/01/2024    HCT 34.7 (L) 05/27/2021     MCV 92 2024    MCV 91 2021    MCH 30.1 2024    MCH 31.1 2021    MCHC 32.8 2024    MCHC 34.3 2021    RDW 13.2 2024    RDW 13.2 2021     (L) 2024     (L) 2021       BMP RESULTS:  Lab Results   Component Value Date     2024     2021    POTASSIUM 5.1 2024    POTASSIUM 5.3 2023    POTASSIUM 4.9 2021    CHLORIDE 107 2024    CHLORIDE 114 (H) 2023    CHLORIDE 105 2021    CO2 22 2024    CO2 22 2023    CO2 23 2021    ANIONGAP 8 2024    ANIONGAP 3 2023    ANIONGAP 6 2021     (H) 2024    GLC 86 2024    GLC 78 2023     (H) 2021    BUN 32.9 (H) 2024    BUN 48 (H) 2023    BUN 43 (H) 2021    CR 1.94 (H) 2024    CR 1.51 (H) 2021    GFRESTIMATED 39 (L) 2024    GFRESTIMATED 51 (L) 2021    GFRESTBLACK 59 (L) 2021    JACOB 9.1 2024    JACOB 8.9 2021        A1C RESULTS:  Lab Results   Component Value Date    A1C 6.2 (H) 2024    A1C 6.3 (H) 2021       INR RESULTS:  Lab Results   Component Value Date    INR 0.89 2018    INR 1.05 2018       Procedures:         [facility],[place]  Echocardiography Laboratory  [address] [place] [id number]     Name: RONNIE ARIZMENDI  MRN: [id number]  : 1965  Study Date: 2017 12:54 PM  Age: 51 yrs  Gender: Male  Patient Location: [facility]  Reason For Study: Liver Transplant  Ordering Physician: MINH PLASENCIA  Performed By: Prabhjot Kauffman RDCS     BSA: 2.3 m2  Height: 72 in  Weight: 235 lb  BP: 160/83 mmHg  _____________________________________________________________________________  __        Procedure  Limited Portable Echo Adult.  _____________________________________________________________________________  __        Interpretation Summary  Global and regional left  ventricular function is normal with an EF of 60-65%.  Mild right ventricular dilation is present.  Global right ventricular function is normal.  Right ventricular systolic pressure is 36mmHg above the right atrial pressure.  No pericardial effusion is present.     This study was compared with the study from 2017, no significant changes  are found.        _____________________________________________________________________________  __        Left Ventricle  Global and regional left ventricular function is normal with an EF of 60-65%.  No regional wall motion abnormalities are seen.     Right Ventricle  Global right ventricular function is normal. Mild right ventricular dilation  is present.     Atria  Moderate biatrial enlargement is present.        Mitral Valve  The mitral valve is normal. Trace mitral insufficiency is present.     Aortic Valve  Mild aortic valve sclerosis is present.     Tricuspid Valve  The tricuspid valve is normal. Mild tricuspid insufficiency is present. Right  ventricular systolic pressure is 36mmHg above the right atrial pressure.     Pulmonic Valve  The pulmonic valve cannot be assessed.     Vessels  The inferior vena cava cannot be assessed.     Pericardium  No pericardial effusion is present.        Compared to Previous Study  This study was compared with the study from 2017, RVSP is lower. .     Attestation  I have personally viewed the imaging and agree with the interpretation and  report as documented by the fellow, Jersey Fletcher, and/or edited by me.  _____________________________________________________________________________  __           Doppler Measurements & Calculations  TR max juliano: 299.0 cm/sec  TR max P.8 mmHg           _____________________________________________________________________________  __           Report approved by: Xochitl Cowan 2017 02:34 PM        Assessment and Plan:    Feliberto Bhagat is a 59-year-old man with multiple comorbidities  including CKD and hypertension who has been maanged by nephrology for several years. His antihypertensive regimen was most recently adjusted with increase of his doxazosin to 8 mg daily. We will obtain more data with blood pressure readings over two weeks before making any changes since some of his blood pressure measurements have been normal. We will obtain an echocardiogram and BNP to evaluate for LVH and signs of heart failure with preserved ejection fraction given his long-standing hypertension and exertional dyspnea.    Coyr Loving MD  Cardiology      Addendum:    His BNP was normal at 224 ng/ml (normal 0-900). His echocardiogram showed normal LV function with eccentric LVH, mild LV dilation, and Grade I diastolic dysfunction. Overall, this was felt to be unchanged from his echocardiogram of 07/21/2017. Both these results are not worrisome for HFpEF.    Cory Loving MD  Cardiology      Please do not hesitate to contact me if you have any questions/concerns.     Sincerely,     Cory Loving MD

## 2024-09-11 ENCOUNTER — ANCILLARY PROCEDURE (OUTPATIENT)
Dept: CARDIOLOGY | Facility: CLINIC | Age: 59
End: 2024-09-11
Attending: INTERNAL MEDICINE
Payer: COMMERCIAL

## 2024-09-11 DIAGNOSIS — E87.5 HYPERKALEMIA: ICD-10-CM

## 2024-09-11 DIAGNOSIS — I10 ESSENTIAL HYPERTENSION: ICD-10-CM

## 2024-09-11 DIAGNOSIS — N18.31 STAGE 3A CHRONIC KIDNEY DISEASE (H): Primary | ICD-10-CM

## 2024-09-11 DIAGNOSIS — I15.1 HYPERTENSION SECONDARY TO OTHER RENAL DISORDERS: ICD-10-CM

## 2024-09-11 LAB — LVEF ECHO: NORMAL

## 2024-09-11 PROCEDURE — 93306 TTE W/DOPPLER COMPLETE: CPT | Performed by: INTERNAL MEDICINE

## 2024-09-11 RX ADMIN — Medication 5 ML: at 16:16

## 2024-09-16 ENCOUNTER — LAB (OUTPATIENT)
Dept: LAB | Facility: CLINIC | Age: 59
End: 2024-09-16
Payer: COMMERCIAL

## 2024-09-16 DIAGNOSIS — I10 ESSENTIAL HYPERTENSION: ICD-10-CM

## 2024-09-16 DIAGNOSIS — N18.31 STAGE 3A CHRONIC KIDNEY DISEASE (H): ICD-10-CM

## 2024-09-16 DIAGNOSIS — E87.5 HYPERKALEMIA: ICD-10-CM

## 2024-09-16 DIAGNOSIS — Z94.4 LIVER REPLACED BY TRANSPLANT (H): ICD-10-CM

## 2024-09-16 DIAGNOSIS — I15.1 HYPERTENSION SECONDARY TO OTHER RENAL DISORDERS: ICD-10-CM

## 2024-09-16 LAB
ALBUMIN SERPL BCG-MCNC: 4.2 G/DL (ref 3.5–5.2)
ALP SERPL-CCNC: 177 U/L (ref 40–150)
ALT SERPL W P-5'-P-CCNC: 18 U/L (ref 0–70)
ANION GAP SERPL CALCULATED.3IONS-SCNC: 15 MMOL/L (ref 7–15)
AST SERPL W P-5'-P-CCNC: 20 U/L (ref 0–45)
BILIRUB DIRECT SERPL-MCNC: <0.2 MG/DL (ref 0–0.3)
BILIRUB SERPL-MCNC: 0.4 MG/DL
BUN SERPL-MCNC: 36.8 MG/DL (ref 8–23)
CALCIUM SERPL-MCNC: 8.8 MG/DL (ref 8.8–10.4)
CHLORIDE SERPL-SCNC: 102 MMOL/L (ref 98–107)
CREAT SERPL-MCNC: 2.04 MG/DL (ref 0.67–1.17)
EGFRCR SERPLBLD CKD-EPI 2021: 37 ML/MIN/1.73M2
ERYTHROCYTE [DISTWIDTH] IN BLOOD BY AUTOMATED COUNT: 13.2 % (ref 10–15)
GLUCOSE SERPL-MCNC: 224 MG/DL (ref 70–99)
HCO3 SERPL-SCNC: 19 MMOL/L (ref 22–29)
HCT VFR BLD AUTO: 30.9 % (ref 40–53)
HGB BLD-MCNC: 10.7 G/DL (ref 13.3–17.7)
MCH RBC QN AUTO: 31 PG (ref 26.5–33)
MCHC RBC AUTO-ENTMCNC: 34.6 G/DL (ref 31.5–36.5)
MCV RBC AUTO: 90 FL (ref 78–100)
NT-PROBNP SERPL-MCNC: 224 PG/ML (ref 0–900)
PHOSPHATE SERPL-MCNC: 4.1 MG/DL (ref 2.5–4.5)
PLATELET # BLD AUTO: 125 10E3/UL (ref 150–450)
POTASSIUM SERPL-SCNC: 4.3 MMOL/L (ref 3.4–5.3)
PROT SERPL-MCNC: 6.8 G/DL (ref 6.4–8.3)
RBC # BLD AUTO: 3.45 10E6/UL (ref 4.4–5.9)
SODIUM SERPL-SCNC: 136 MMOL/L (ref 135–145)
TACROLIMUS BLD-MCNC: 4 UG/L (ref 5–15)
TME LAST DOSE: ABNORMAL H
TME LAST DOSE: ABNORMAL H
WBC # BLD AUTO: 6.1 10E3/UL (ref 4–11)

## 2024-09-16 PROCEDURE — 85027 COMPLETE CBC AUTOMATED: CPT | Performed by: PATHOLOGY

## 2024-09-16 PROCEDURE — 83880 ASSAY OF NATRIURETIC PEPTIDE: CPT | Performed by: PATHOLOGY

## 2024-09-16 PROCEDURE — 82248 BILIRUBIN DIRECT: CPT | Performed by: PATHOLOGY

## 2024-09-16 PROCEDURE — 80197 ASSAY OF TACROLIMUS: CPT | Performed by: INTERNAL MEDICINE

## 2024-09-16 PROCEDURE — 80053 COMPREHEN METABOLIC PANEL: CPT | Performed by: PATHOLOGY

## 2024-09-16 PROCEDURE — 36415 COLL VENOUS BLD VENIPUNCTURE: CPT | Performed by: PATHOLOGY

## 2024-09-16 PROCEDURE — 84100 ASSAY OF PHOSPHORUS: CPT | Performed by: PATHOLOGY

## 2024-09-16 PROCEDURE — 99000 SPECIMEN HANDLING OFFICE-LAB: CPT | Performed by: PATHOLOGY

## 2024-09-18 ENCOUNTER — OFFICE VISIT (OUTPATIENT)
Dept: ORTHOPEDICS | Facility: CLINIC | Age: 59
End: 2024-09-18
Payer: COMMERCIAL

## 2024-09-18 DIAGNOSIS — L60.2 LONG TOENAIL: ICD-10-CM

## 2024-09-18 DIAGNOSIS — Q66.6 CONGENITAL REARFOOT VALGUS: ICD-10-CM

## 2024-09-18 DIAGNOSIS — M77.41 METATARSALGIA OF BOTH FEET: Primary | ICD-10-CM

## 2024-09-18 DIAGNOSIS — E11.49 TYPE II OR UNSPECIFIED TYPE DIABETES MELLITUS WITH NEUROLOGICAL MANIFESTATIONS, NOT STATED AS UNCONTROLLED(250.60) (H): ICD-10-CM

## 2024-09-18 DIAGNOSIS — L60.0 ONYCHOCRYPTOSIS: ICD-10-CM

## 2024-09-18 DIAGNOSIS — M77.42 METATARSALGIA OF BOTH FEET: Primary | ICD-10-CM

## 2024-09-18 PROCEDURE — 11719 TRIM NAIL(S) ANY NUMBER: CPT | Performed by: PODIATRIST

## 2024-09-18 PROCEDURE — 99213 OFFICE O/P EST LOW 20 MIN: CPT | Mod: 25 | Performed by: PODIATRIST

## 2024-09-18 NOTE — PROGRESS NOTES
Date of Service: 11/17/2023    Chief Complaint:   Chief Complaint   Patient presents with    Follow Up     Diabetic foot care.         HPI: Camacho is a 58 year old male who presents today for a diabetic foot eval and management. He has been diabetic for years. Has liver transplant. Notes that he is having pain in the outside of both feet. Has diabetic shoes and inserts.     Interval hx: he is doing well. He has an ingrown nail on the left lateral hallux.     Review of Systems: No n/v/d/f/c/ns/sob/cp    PMH:   Past Medical History:   Diagnosis Date    Cancer (H) 12/15    Stage 4 liver cancer    Diabetes type 2, controlled (H) 11/10/2016    Esophageal reflux     Fibromyalgia 01/2009    dx with Dr Benitez( Rheum)    Gangrene of finger (H) 08/25/2017    H/O deep venous thrombosis 11/2001    Permanent IVC filter in place.    H/O CASTAÑEDA (nonalcoholic steatohepatitis)     H/O Pneumonia, organism unspecified(486) 10/2001    Included ARDS, sepsis, and  acute renal failure; hospitalized, required tracheostomy placement.    H/O: HTN (hypertension) 11/2001    No longer prescribed antihypertensive medication.      History of hepatocellular carcinoma     History of liver transplant (H)     History of obstructive sleep apnea     No longer wears CPAP since losing approximately 200 pounds with his liver transplant and its complications.      HLD (hyperlipidemia)     Hypertension     Ischemia of both lower extremities 08/25/2017    Distal ischemia due to shock/high pressor requirements    Liver transplant rejection (H) 06/11/2018    Neutropenic colitis  (H24) 07/04/2017    Osteoarthritis     Presence of PERMANENT IVC filter     Rheumatoid arthritis(714.0)     remission for many years       PSxH:   Past Surgical History:   Procedure Laterality Date    ARTHROSCOPY KNEE WITH MENISCAL REPAIR Right 2008    Right knee arthroscopy    BENCH LIVER N/A 03/04/2017    Procedure: BENCH LIVER;  Surgeon: Jovan Tran MD;  Location:  OR     BIOPSY  5/2017    Liver    CHOLECYSTECTOMY      COLONOSCOPY N/A 07/21/2017    Procedure: COMBINED COLONOSCOPY, SINGLE OR MULTIPLE BIOPSY/POLYPECTOMY BY BIOPSY;  Colonoscopy;  Surgeon: Izaiah Montes MD;  Location: [facility]    COLONOSCOPY N/A 10/24/2022    Procedure: COLONOSCOPY, WITH POLYPECTOMY AND BIOPSY;  Surgeon: Abril Mcneill MD;  Location: [facility]    ENDOSCOPIC RETROGRADE CHOLANGIOPANCREATOGRAM N/A 05/22/2018    Procedure: COMBINED ENDOSCOPIC RETROGRADE CHOLANGIOPANCREATOGRAPHY, PLACE TUBE/STENT;  Endoscopic Retrograde Cholangiopancreatography with sphincterotomy and pancreatic duct stent placement;  Surgeon: Zay Gaitan MD;  Location: [facility]    ENT SURGERY      Repair of deviated septum    ESOPHAGOSCOPY, GASTROSCOPY, DUODENOSCOPY (EGD), COMBINED N/A 08/04/2016    Procedure: COMBINED ESOPHAGOSCOPY, GASTROSCOPY, DUODENOSCOPY (EGD), BIOPSY SINGLE OR MULTIPLE;  Surgeon: Trent Pederson MD;  Location:  GI    ESOPHAGOSCOPY, GASTROSCOPY, DUODENOSCOPY (EGD), COMBINED N/A 08/27/2017    Procedure: COMBINED ESOPHAGOSCOPY, GASTROSCOPY, DUODENOSCOPY (EGD);;  Surgeon: Los Wynn MD;  Location: [facility]    ESOPHAGOSCOPY, GASTROSCOPY, DUODENOSCOPY (EGD), COMBINED N/A 08/17/2023    Procedure: Esophagoscopy, gastroscopy, duodenoscopy (EGD), combined;  Surgeon: Trent Villanueva MD;  Location: [facility]    INCISION AND DRAINAGE ABDOMEN WASHOUT, COMBINED Right 02/14/2018    Procedure: COMBINED INCISION AND DRAINAGE ABDOMEN WASHOUT;  COMBINED INCISION AND DRAINAGE right ABDOMEN flank;  Surgeon: Sara Dinh MD;  [facility]    LAPAROTOMY EXPLORATORY N/A 07/04/2017    Procedure: LAPAROTOMY EXPLORATORY;  Exploratory Laparotomy, washout;  Surgeon: Tip Zhang MD;  [facility]    LAPAROTOMY EXPLORATORY N/A 07/05/2017    Procedure: LAPAROTOMY EXPLORATORY;  Exploratory Laparotomy, Washout with closure.;  Surgeon: Tip Zhang MD;  [facility]    LAPAROTOMY EXPLORATORY N/A  2017    Procedure: LAPAROTOMY EXPLORATORY;  Exploratory Laparotomy, Right angelique-colectomy, end ileostomy, mucosal fistula, partial omentectomy;  Surgeon: Sara Dinh MD;  Location: [facility]    LASIK      ORTHOPEDIC SURGERY Right     Repair of dislocated wrist.      PHACOEMULSIFICATION CLEAR CORNEA WITH STANDARD INTRAOCULAR LENS IMPLANT Right 2024    Procedure: RIGHT EYE PHACOEMULSIFICATION, COMPLEX CATARACT, WITH INTRAOCULAR LENS IMPLANT;  Surgeon: Stan Roque MD;  Location: [facility] OR    RELEASE TRIGGER FINGER Right 11/10/2017    Procedure: RELEASE TRIGGER FINGER;  Debride, V-Y Flap Right Index Finger;  Surgeon: Momo Noonan MD;  [facility]    TAKEDOWN ILEOSTOMY N/A 2018    Procedure: TAKEDOWN ILEOSTOMY;  Exploratory Laparotomy, Lysis of Adhesions, Takedown Of End Ileostomy, Takedown of mucocutaneous fistula, ileocolic resection  And Colorectal Anastomosis, Primary repair of Ventral Hernia, Anesthesia Block ;  Surgeon: Sara Dinh MD;  Location: [facility]    TRACHEOSTOMY  2001    During hospitalization for pneumonia.      TRANSPLANT LIVER RECIPIENT  DONOR N/A 2017    Procedure: TRANSPLANT LIVER RECIPIENT  DONOR;  Surgeon: Jovan Tran MD;  Location: [facility]       Allergies: Erythromycin, Losartan, and Vioxx    SH:   Social History     Socioeconomic History    Marital status:      Spouse name: Not on file    Number of children: 1    Years of education: Not on file    Highest education level: Not on file   Occupational History    Occupation:     Tobacco Use    Smoking status: Former     Current packs/day: 0.00     Average packs/day: 0.5 packs/day for 1 year (0.5 ttl pk-yrs)     Types: Cigarettes, Cigars, Dip, chew, snus or snuff     Start date: 1987     Quit date: 1987     Years since quittin.2    Smokeless tobacco: Former     Types: Chew     Quit date: 10/8/2015    Tobacco comments:      1 Cigar once every other month.   Substance and Sexual Activity    Alcohol use: Not Currently     Comment: No alcohol since liver transplant.      Drug use: Never    Sexual activity: Yes     Partners: Female     Birth control/protection: None   Other Topics Concern    Parent/sibling w/ CABG, MI or angioplasty before 65F 55M? 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         Social Determinants of Health     Financial Resource Strain: Low Risk  (8/8/2024)    Financial Resource Strain     Within the past 12 months, have you or your family members you live with been unable to get utilities (heat, electricity) when it was really needed?: No   Food Insecurity: Low Risk  (8/8/2024)    Food Insecurity     Within the past 12 months, did you worry that your food would run out before you got money to buy more?: No     Within the past 12 months, did the food you bought just not last and you didn t have money to get more?: No   Transportation Needs: Low Risk  (8/8/2024)    Transportation Needs     Within the past 12 months, has lack of transportation kept you from medical appointments, getting your medicines, non-medical meetings or appointments, work, or from getting things that you need?: No   Physical Activity: Not on file   Stress: Not on file   Social Connections: Not on file   Interpersonal Safety: Low Risk  (1/24/2024)    Interpersonal Safety     Do you feel physically and emotionally safe where you currently live?: Yes     Within the past 12 months, have you been hit, slapped, kicked or otherwise physically hurt by someone?: No     Within the past 12 months, have you been humiliated or emotionally abused in other ways by your partner or ex-partner?: No   Housing Stability: Low Risk  (8/8/2024)    Housing Stability     Do you have housing? : Yes     Are you worried about losing your housing?: No       FH:   Family History   Problem Relation Age of Onset    Arthritis Mother      Thyroid Cancer Mother         Survivor!    Thyroid Disease Mother         Thyroid cancer    Diabetes Father     Substance Abuse Father     Cervical Cancer Maternal Grandmother     Skin Cancer Maternal Grandfather     Cerebrovascular Disease Maternal Grandfather     No Known Problems Paternal Grandmother     Prostate Cancer Paternal Grandfather     Colon Cancer No family hx of     Hyperlipidemia No family hx of     Coronary Artery Disease No family hx of     Breast Cancer No family hx of     Glaucoma No family hx of     Hypertension No family hx of     Macular Degeneration No family hx of        Objective:  Data Unavailable Data Unavailable Data Unavailable Data Unavailable Data Unavailable 0 lbs 0 oz    PT and DP pulses are 2/4 bilaterally. CRT is instant. Diminished pedal hair.   Gross sensation is diminished bilaterally. Protective sensation is diminished as demonstrated with a 5.07G monofilament.   Equinus is noted bilaterally. No pain with active or passive ROM of the ankle, MTJ, 1st ray, or halluces bilaterally,. When standing on the inserts, he is being rolled outward, onto the 5th met heads.   Nails elongated bilaterally. Left hallux nail is incurvated with a granuloma on the lateral border. He has neuropathy, so a deep slantback was performed on the offending nail and the granuloma was cauterized with silver nitrate. No open lesions are noted.     Assessment:   Encounter Diagnoses   Name Primary?    Metatarsalgia of both feet Yes    Congenital rearfoot valgus     Type II or unspecified type diabetes mellitus with neurological manifestations, not stated as uncontrolled(250.60) (H)     Long toenail     Onychocryptosis            Plan:  - Pt seen and evaluated  - Orders were written for the orthotics.   - He should not trim his nails 2/2 neuropathy. Nails trimmed today x 10. Slantback on the left hallux.   - Moisturize daily.  - See again in 3 months.

## 2024-09-18 NOTE — LETTER
9/18/2024      Camacho Bhagat  [address] [facility] [id number]      Dear Colleague,    Thank you for referring your patient, Camacho Bhagat, to the [facility] [place]. Please see a copy of my visit note below.    Date of Service: 11/17/2023    Chief Complaint:   Chief Complaint   Patient presents with     Follow Up     Diabetic foot care.         HPI: Camacho is a 58 year old male who presents today for a diabetic foot eval and management. He has been diabetic for years. Has liver transplant. Notes that he is having pain in the outside of both feet. Has diabetic shoes and inserts.     Interval hx: he is doing well. He has an ingrown nail on the left lateral hallux.     Review of Systems: No n/v/d/f/c/ns/sob/cp    PMH:   Past Medical History:   Diagnosis Date     Cancer (H) 12/15    Stage 4 liver cancer     Diabetes type 2, controlled (H) 11/10/2016     Esophageal reflux      Fibromyalgia 01/2009    dx with Dr Benitez( Rheum)     Gangrene of finger (H) 08/25/2017     H/O deep venous thrombosis 11/2001    Permanent IVC filter in place.     H/O CASTAÑEDA (nonalcoholic steatohepatitis)      H/O Pneumonia, organism unspecified(486) 10/2001    Included ARDS, sepsis, and  acute renal failure; hospitalized, required tracheostomy placement.     H/O: HTN (hypertension) 11/2001    No longer prescribed antihypertensive medication.       History of hepatocellular carcinoma      History of liver transplant (H)      History of obstructive sleep apnea     No longer wears CPAP since losing approximately 200 pounds with his liver transplant and its complications.       HLD (hyperlipidemia)      Hypertension      Ischemia of both lower extremities 08/25/2017    Distal ischemia due to shock/high pressor requirements     Liver transplant rejection (H) 06/11/2018     Neutropenic colitis  (H24) 07/04/2017     Osteoarthritis      Presence of PERMANENT IVC filter      Rheumatoid arthritis(714.0)     remission  for many years       PSxH:   Past Surgical History:   Procedure Laterality Date     ARTHROSCOPY KNEE WITH MENISCAL REPAIR Right 2008    Right knee arthroscopy     BENCH LIVER N/A 03/04/2017    Procedure: BENCH LIVER;  Surgeon: Jovan Tran MD;  Location: [facility]     BIOPSY  5/2017    Liver     CHOLECYSTECTOMY       COLONOSCOPY N/A 07/21/2017    Procedure: COMBINED COLONOSCOPY, SINGLE OR MULTIPLE BIOPSY/POLYPECTOMY BY BIOPSY;  Colonoscopy;  Surgeon: Izaiah Montes MD;  Location: [facility]     COLONOSCOPY N/A 10/24/2022    Procedure: COLONOSCOPY, WITH POLYPECTOMY AND BIOPSY;  Surgeon: Abril Mcneill MD;  [facility]: [facility]     ENDOSCOPIC RETROGRADE CHOLANGIOPANCREATOGRAM N/A 05/22/2018    Procedure: COMBINED ENDOSCOPIC RETROGRADE CHOLANGIOPANCREATOGRAPHY, PLACE TUBE/STENT;  Endoscopic Retrograde Cholangiopancreatography with sphincterotomy and pancreatic duct stent placement;  Surgeon: Zay Gaitan MD;  [facility]     ENT SURGERY      Repair of deviated septum     ESOPHAGOSCOPY, GASTROSCOPY, DUODENOSCOPY (EGD), COMBINED N/A 08/04/2016    Procedure: COMBINED ESOPHAGOSCOPY, GASTROSCOPY, DUODENOSCOPY (EGD), BIOPSY SINGLE OR MULTIPLE;  Surgeon: Trent Pederson MD;  Location:  GI     ESOPHAGOSCOPY, GASTROSCOPY, DUODENOSCOPY (EGD), COMBINED N/A 08/27/2017    Procedure: COMBINED ESOPHAGOSCOPY, GASTROSCOPY, DUODENOSCOPY (EGD);;  Surgeon: Los Wnyn MD;  [facility]: [facility]     ESOPHAGOSCOPY, GASTROSCOPY, DUODENOSCOPY (EGD), COMBINED N/A 08/17/2023    Procedure: Esophagoscopy, gastroscopy, duodenoscopy (EGD), combined;  Surgeon: Trent Villanueva MD;  [facility]: [facility]     INCISION AND DRAINAGE ABDOMEN WASHOUT, COMBINED Right 02/14/2018    Procedure: COMBINED INCISION AND DRAINAGE ABDOMEN WASHOUT;  COMBINED INCISION AND DRAINAGE right ABDOMEN flank;  Surgeon: Sara Dinh MD;  [facility]     LAPAROTOMY EXPLORATORY N/A 07/04/2017    Procedure: LAPAROTOMY EXPLORATORY;   Exploratory Laparotomy, washout;  Surgeon: Tip Zhang MD;  [facility]     LAPAROTOMY EXPLORATORY N/A 2017    Procedure: LAPAROTOMY EXPLORATORY;  Exploratory Laparotomy, Washout with closure.;  Surgeon: Tip Zhang MD;  [facility]     LAPAROTOMY EXPLORATORY N/A 2017    Procedure: LAPAROTOMY EXPLORATORY;  Exploratory Laparotomy, Right angelique-colectomy, end ileostomy, mucosal fistula, partial omentectomy;  Surgeon: Sara Dinh MD;  [facility]     LASIK       ORTHOPEDIC SURGERY Right     Repair of dislocated wrist.       PHACOEMULSIFICATION CLEAR CORNEA WITH STANDARD INTRAOCULAR LENS IMPLANT Right 2024    Procedure: RIGHT EYE PHACOEMULSIFICATION, COMPLEX CATARACT, WITH INTRAOCULAR LENS IMPLANT;  Surgeon: Stan Roque MD;  Location: [facility]     RELEASE TRIGGER FINGER Right 11/10/2017    Procedure: RELEASE TRIGGER FINGER;  Debride, V-Y Flap Right Index Finger;  Surgeon: Momo Noonan MD;  [facility]     TAKEDOWN ILEOSTOMY N/A 2018    Procedure: TAKEDOWN ILEOSTOMY;  Exploratory Laparotomy, Lysis of Adhesions, Takedown Of End Ileostomy, Takedown of mucocutaneous fistula, ileocolic resection  And Colorectal Anastomosis, Primary repair of Ventral Hernia, Anesthesia Block ;  Surgeon: Sara Dinh MD;  [facility]     TRACHEOSTOMY  2001    During hospitalization for pneumonia.       TRANSPLANT LIVER RECIPIENT  DONOR N/A 2017    Procedure: TRANSPLANT LIVER RECIPIENT  DONOR;  Surgeon: Jovan Tran MD;  [facility]       Allergies: Erythromycin, Losartan, and Vioxx    SH:   Social History     Socioeconomic History     Marital status:      Spouse name: Not on file     Number of children: 1     Years of education: Not on file     Highest education level: Not on file   Occupational History     Occupation:     Tobacco Use     Smoking status: Former     Current  packs/day: 0.00     Average packs/day: 0.5 packs/day for 1 year (0.5 ttl pk-yrs)     Types: Cigarettes, Cigars, Dip, chew, snus or snuff     Start date: 1987     Quit date: 1987     Years since quittin.2     Smokeless tobacco: Former     Types: Chew     Quit date: 10/8/2015     Tobacco comments:     1 Cigar once every other month.   Substance and Sexual Activity     Alcohol use: Not Currently     Comment: No alcohol since liver transplant.       Drug use: Never     Sexual activity: Yes     Partners: Female     Birth control/protection: None   Other Topics Concern     Parent/sibling w/ CABG, MI or angioplasty before 65F 55M? 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         Social Determinants of Health     Financial Resource Strain: Low Risk  (2024)    Financial Resource Strain      Within the past 12 months, have you or your family members you live with been unable to get utilities (heat, electricity) when it was really needed?: No   Food Insecurity: Low Risk  (2024)    Food Insecurity      Within the past 12 months, did you worry that your food would run out before you got money to buy more?: No      Within the past 12 months, did the food you bought just not last and you didn t have money to get more?: No   Transportation Needs: Low Risk  (2024)    Transportation Needs      Within the past 12 months, has lack of transportation kept you from medical appointments, getting your medicines, non-medical meetings or appointments, work, or from getting things that you need?: No   Physical Activity: Not on file   Stress: Not on file   Social Connections: Not on file   Interpersonal Safety: Low Risk  (2024)    Interpersonal Safety      Do you feel physically and emotionally safe where you currently live?: Yes      Within the past 12 months, have you been hit, slapped, kicked or otherwise physically hurt by someone?: No      Within the past 12 months,  have you been humiliated or emotionally abused in other ways by your partner or ex-partner?: No   Housing Stability: Low Risk  (8/8/2024)    Housing Stability      Do you have housing? : Yes      Are you worried about losing your housing?: No       FH:   Family History   Problem Relation Age of Onset     Arthritis Mother      Thyroid Cancer Mother         Survivor!     Thyroid Disease Mother         Thyroid cancer     Diabetes Father      Substance Abuse Father      Cervical Cancer Maternal Grandmother      Skin Cancer Maternal Grandfather      Cerebrovascular Disease Maternal Grandfather      No Known Problems Paternal Grandmother      Prostate Cancer Paternal Grandfather      Colon Cancer No family hx of      Hyperlipidemia No family hx of      Coronary Artery Disease No family hx of      Breast Cancer No family hx of      Glaucoma No family hx of      Hypertension No family hx of      Macular Degeneration No family hx of        Objective:  Data Unavailable Data Unavailable Data Unavailable Data Unavailable Data Unavailable 0 lbs 0 oz    PT and DP pulses are 2/4 bilaterally. CRT is instant. Diminished pedal hair.   Gross sensation is diminished bilaterally. Protective sensation is diminished as demonstrated with a 5.07G monofilament.   Equinus is noted bilaterally. No pain with active or passive ROM of the ankle, MTJ, 1st ray, or halluces bilaterally,. When standing on the inserts, he is being rolled outward, onto the 5th met heads.   Nails elongated bilaterally. Left hallux nail is incurvated with a granuloma on the lateral border. He has neuropathy, so a deep slantback was performed on the offending nail and the granuloma was cauterized with silver nitrate. No open lesions are noted.     Assessment:   Encounter Diagnoses   Name Primary?     Metatarsalgia of both feet Yes     Congenital rearfoot valgus      Type II or unspecified type diabetes mellitus with neurological manifestations, not stated as  uncontrolled(250.60) (H)      Long toenail      Onychocryptosis            Plan:  - Pt seen and evaluated  - Orders were written for the orthotics.   - He should not trim his nails 2/2 neuropathy. Nails trimmed today x 10. Slantback on the left hallux.   - Moisturize daily.  - See again in 3 months.            Again, thank you for allowing me to participate in the care of your patient.        Sincerely,        Leo Joshi DPM

## 2024-09-24 DIAGNOSIS — Z79.4 TYPE 2 DIABETES MELLITUS WITH BOTH EYES AFFECTED BY MILD NONPROLIFERATIVE RETINOPATHY AND MACULAR EDEMA, WITH LONG-TERM CURRENT USE OF INSULIN (H): Primary | ICD-10-CM

## 2024-09-24 DIAGNOSIS — E11.3213 TYPE 2 DIABETES MELLITUS WITH BOTH EYES AFFECTED BY MILD NONPROLIFERATIVE RETINOPATHY AND MACULAR EDEMA, WITH LONG-TERM CURRENT USE OF INSULIN (H): Primary | ICD-10-CM

## 2024-09-25 ENCOUNTER — OFFICE VISIT (OUTPATIENT)
Dept: OPHTHALMOLOGY | Facility: CLINIC | Age: 59
End: 2024-09-25
Attending: OPHTHALMOLOGY
Payer: COMMERCIAL

## 2024-09-25 DIAGNOSIS — E11.3213 TYPE 2 DIABETES MELLITUS WITH BOTH EYES AFFECTED BY MILD NONPROLIFERATIVE RETINOPATHY AND MACULAR EDEMA, WITH LONG-TERM CURRENT USE OF INSULIN (H): ICD-10-CM

## 2024-09-25 DIAGNOSIS — Z79.4 TYPE 2 DIABETES MELLITUS WITH BOTH EYES AFFECTED BY MILD NONPROLIFERATIVE RETINOPATHY AND MACULAR EDEMA, WITH LONG-TERM CURRENT USE OF INSULIN (H): ICD-10-CM

## 2024-09-25 PROCEDURE — 99213 OFFICE O/P EST LOW 20 MIN: CPT | Performed by: OPHTHALMOLOGY

## 2024-09-25 PROCEDURE — 92134 CPTRZ OPH DX IMG PST SGM RTA: CPT | Performed by: OPHTHALMOLOGY

## 2024-09-25 ASSESSMENT — REFRACTION_WEARINGRX
OS_ADD: +2.25
OD_CYLINDER: SPHERE
OS_SPHERE: +1.50
OD_ADD: +2.25
OS_CYLINDER: +0.25
OD_SPHERE: -0.25
OS_AXIS: 006

## 2024-09-25 ASSESSMENT — CUP TO DISC RATIO
OD_RATIO: 0.1
OS_RATIO: 0.1

## 2024-09-25 ASSESSMENT — SLIT LAMP EXAM - LIDS
COMMENTS: NORMAL
COMMENTS: NORMAL

## 2024-09-25 ASSESSMENT — TONOMETRY
IOP_METHOD: TONOPEN
OD_IOP_MMHG: 14
OS_IOP_MMHG: 15

## 2024-09-25 ASSESSMENT — VISUAL ACUITY
CORRECTION_TYPE: GLASSES
OD_CC+: -1
OS_CC: 20/50
OD_CC: 20/20
METHOD: SNELLEN - LINEAR

## 2024-09-25 ASSESSMENT — EXTERNAL EXAM - RIGHT EYE: OD_EXAM: NORMAL

## 2024-09-25 NOTE — PROGRESS NOTES
HPI       Follow Up    In both eyes.  Since onset it is stable.  Associated symptoms include dryness.  Negative for eye pain, flashes and floaters.  Treatments tried include no treatments.  Pain was noted as 0/10. Additional comments: Type 2 diabetes mellitus with both eyes affected by mild nonproliferative retinopathy and macular edema, with long-term current use of insulin             Comments    He states that his vision has seemed stable in both eyes, since his last eye exam.  He tells me that his left eye feels dry at times.     Lab Results       Component                Value               Date                       A1C                      6.2                 07/05/2024                 A1C                      5.8                 03/01/2024                 A1C                      6.2                 11/30/2023                 A1C                      5.7                 07/31/2023                 A1C                      6.0                 09/27/2022                 A1C                      6.3                 03/31/2021                 A1C                      6.7                 02/12/2021              ROXANA Gomez 7:19 AM  September 25, 2024               Last edited by Estefani Munoz COT on 9/25/2024  7:20 AM.         Review of systems for the eyes was negative other than the pertinent positives/negatives listed in the HPI.      Assessment & Plan    HPI:  Camacho Bhagat is a 58 year old male with history of T2DM, HTN, HLD, ED, liver transplant, laser in situ keratomileusis (LASIK) presents for followup Diabetic macular edema both eyes. Vision is stable. BGs doing well    POHx: lasik  PMHx: 2DM, HTN, HLD, ED, liver transplant  Current Medications:   Current Outpatient Medications   Medication Sig Dispense Refill    alcohol swab prep pads Use to swab area of injection/francisca as directed. 100 each 3    alpha-lipoic acid 600 MG capsule Take 600 mg by mouth      amLODIPine (NORVASC) 10 MG tablet Take 1  tablet (10 mg) by mouth daily 90 tablet 3    augmented betamethasone dipropionate (DIPROLENE-AF) 0.05 % external ointment Apply twice daily as needed for rash on the legs 45 g 11    cholecalciferol (VITAMIN D3) 1000 UNIT tablet Take 1 tablet (1,000 Units) by mouth daily (Patient taking differently: Take 1,000 Units by mouth every morning.) 100 tablet 3    Continuous Blood Gluc Sensor (DEXCOM G6 SENSOR) MISC Change every 10 days. 9 each 3    Continuous Blood Gluc Transmit (DEXCOM G6 TRANSMITTER) MISC Change every 3 months. 1 each 3    cyclobenzaprine (FLEXERIL) 10 MG tablet Take 1 tablet (10 mg) by mouth 3 times daily as needed for muscle spasms 90 tablet 3    doxazosin (CARDURA) 8 MG tablet Take 1 tablet (8 mg) by mouth at bedtime Take a total of 10 mg at bedtime 90 tablet 3    empagliflozin (JARDIANCE) 10 MG TABS tablet Take 1 tablet (10 mg) by mouth daily. 90 tablet 1    fluticasone (FLONASE) 50 MCG/ACT nasal spray Spray 1 spray into both nostrils daily 20 mL 3    furosemide (LASIX) 40 MG tablet Take 1 tablet (40 mg) by mouth daily . 90 tablet 3    gabapentin (NEURONTIN) 800 MG tablet Take 1 tablet (800 mg) by mouth 3 times daily 90 tablet 11    hydrOXYzine (ATARAX) 25 MG tablet Take 1 tablet (25 mg) by mouth 3 times daily as needed for itching 90 tablet 3    Insulin Disposable Pump (OMNIPOD 5 G6 INTRO, GEN 5,) KIT 1 kit daily 1 kit 0    Insulin Disposable Pump (OMNIPOD 5 G6 POD, GEN 5,) MISC 1 each See Admin Instructions Change every 2 days. Use for insulin administration. 45 each 3    Insulin Disposable Pump (OMNIPOD 5 G6 POD, GEN 5,) MISC 1 each See Admin Instructions Use per 's instructions. 1 each 1    Insulin Lispro (HUMALOG KWIKPEN) 200 UNIT/ML soln To be used in the insulin pump. Total daily dose up to 170 U. 72 mL 3    insulin pen needle (BD ENE U/F) 32G X 4 MM miscellaneous Use 1 pen needle 4 times daily or as directed. 400 each 1    lidocaine (LIDODERM) 5 % patch Place 1 patch onto the skin  every 24 hours To prevent lidocaine toxicity, patient should be patch free for 12 hrs daily. 30 patch 11    lidocaine (XYLOCAINE) 5 % external ointment Apply topically as needed for moderate pain 90 g 11    losartan (COZAAR) 25 MG tablet Take 1 tablet (25 mg) by mouth daily 90 tablet 3    methocarbamol (ROBAXIN) 500 MG tablet Take 1 tablet (500 mg) by mouth 2 times daily as needed for muscle spasms 60 tablet 0    metoprolol succinate ER (TOPROL XL) 200 MG 24 hr tablet Take 1 tablet (200 mg) by mouth daily 90 tablet 3    mycophenolic acid (GENERIC EQUIVALENT) 360 MG EC tablet Take 2 tablets (720 mg) by mouth every 12 hours 360 tablet 3    oxyCODONE (ROXICODONE) 5 MG tablet Take 1 tablet (5 mg) by mouth 4 times daily as needed for pain maximum 6 tablet(s) per day 30 tablet 0    predniSONE (DELTASONE) 2.5 MG tablet Take 1 tablet (2.5 mg) by mouth daily 90 tablet 3    sildenafil (REVATIO) 20 MG tablet Take 2 tablets (40 mg) by mouth daily as needed (erectile dysfunction) 10 tablet 11    tacrolimus (GENERIC EQUIVALENT) 1 MG capsule Take 4 capsules (4 mg) by mouth every morning AND 3 capsules (3 mg) every evening. 630 capsule 3    tirzepatide (MOUNJARO) 2.5 MG/0.5ML pen Inject 2.5 mg Subcutaneous every 7 days 2 mL 1    tirzepatide (MOUNJARO) 5 MG/0.5ML pen Inject 5 mg subcutaneously every 7 days. 2 mL 2    tretinoin (RETIN-A) 0.025 % external cream Apply a pea-sized amount evenly over the face at nighttime before bed 45 g 11    triamcinolone (KENALOG) 0.1 % external ointment Apply topically 2 times daily to the itching on the legs 80 g 1    ursodiol (ACTIGALL) 500 MG tablet Take 1 tablet (500 mg) by mouth 2 times daily 180 tablet 3     No current facility-administered medications for this visit.     PSHx: Cataract extraction/intraocular lens right eye 2/1/24      Current Eye Medications:    Assessment & Plan:  (Z96.1) Pseudophakia, right eye  (primary encounter diagnosis)  Cataract extraction/intraocular lens right eye  Jude 2/1/24    (H25.812) Combined forms of age-related cataract of left eye  Mild cataracts are present and may account for some of the patient's visual complaints. No treatment currently recommended. The patient will monitor for vision changes and contact us with any decrease in vision. Recheck next visit     (Z98.890) H/O laser assisted in situ keratomileusis  2003 with mild regression left eye     (H52.03,  H52.203,  H52.4) Hyperopia of both eyes with astigmatism and presbyopia  Doing well with current MRx    (E11.3213,  Z79.4) Type 2 diabetes mellitus with both eyes affected by mild nonproliferative retinopathy and macular edema, with long-term current use of insulin (H)  Diagnosed 2003  Most recent HgBA1c 6.2 on 7/5/24  Mild background diabetic retinopathy without neovascularization noted on today's exam.    Given worsening edema and possible need for anti-VEGF both eyes, will refer to retina to consider FA vs Avastin one or both eyes     (H04.129) Dry eye    Return for v/t/d/oct mac, likely anti-VEGF one or both eyes.        Stan Roque MD     Attending Physician Attestation:  Complete documentation of historical and exam elements from today's encounter can be found in the full encounter summary report (not reduplicated in this progress note).  I personally obtained the chief complaint(s) and history of present illness.  I confirmed and edited as necessary the review of systems, past medical/surgical history, family history, social history, and examination findings as documented by others; and I examined the patient myself.  I personally reviewed the relevant tests, images, and reports as documented above.  I formulated and edited as necessary the assessment and plan and discussed the findings and management plan with the patient and family. - Stan Roque MD

## 2024-09-25 NOTE — NURSING NOTE
Chief Complaints and History of Present Illnesses   Patient presents with    Follow Up     Type 2 diabetes mellitus with both eyes affected by mild nonproliferative retinopathy and macular edema, with long-term current use of insulin     Chief Complaint(s) and History of Present Illness(es)       Follow Up              Laterality: both eyes    Course: stable    Associated symptoms: dryness.  Negative for eye pain, flashes and floaters    Treatments tried: no treatments    Pain scale: 0/10    Comments: Type 2 diabetes mellitus with both eyes affected by mild nonproliferative retinopathy and macular edema, with long-term current use of insulin              Comments    He states that his vision has seemed stable in both eyes, since his last eye exam.  He tells me that his left eye feels dry at times.     Lab Results       Component                Value               Date                       A1C                      6.2                 07/05/2024                 A1C                      5.8                 03/01/2024                 A1C                      6.2                 11/30/2023                 A1C                      5.7                 07/31/2023                 A1C                      6.0                 09/27/2022                 A1C                      6.3                 03/31/2021                 A1C                      6.7                 02/12/2021              ROXANA Gomez 7:19 AM  September 25, 2024

## 2024-09-30 ENCOUNTER — TELEPHONE (OUTPATIENT)
Dept: ENDOCRINOLOGY | Facility: CLINIC | Age: 59
End: 2024-09-30

## 2024-09-30 ENCOUNTER — OFFICE VISIT (OUTPATIENT)
Dept: ENDOCRINOLOGY | Facility: CLINIC | Age: 59
End: 2024-09-30
Payer: COMMERCIAL

## 2024-09-30 VITALS
HEART RATE: 71 BPM | DIASTOLIC BLOOD PRESSURE: 78 MMHG | WEIGHT: 290 LBS | OXYGEN SATURATION: 98 % | BODY MASS INDEX: 39.32 KG/M2 | SYSTOLIC BLOOD PRESSURE: 138 MMHG

## 2024-09-30 DIAGNOSIS — E11.21 TYPE 2 DIABETES MELLITUS WITH DIABETIC NEPHROPATHY, WITH LONG-TERM CURRENT USE OF INSULIN (H): ICD-10-CM

## 2024-09-30 DIAGNOSIS — E66.01 CLASS 3 SEVERE OBESITY WITH SERIOUS COMORBIDITY AND BODY MASS INDEX (BMI) OF 40.0 TO 44.9 IN ADULT, UNSPECIFIED OBESITY TYPE (H): ICD-10-CM

## 2024-09-30 DIAGNOSIS — E11.42 TYPE 2 DIABETES MELLITUS WITH DIABETIC POLYNEUROPATHY, WITH LONG-TERM CURRENT USE OF INSULIN (H): Primary | ICD-10-CM

## 2024-09-30 DIAGNOSIS — Z79.4 TYPE 2 DIABETES MELLITUS WITH DIABETIC NEPHROPATHY, WITH LONG-TERM CURRENT USE OF INSULIN (H): ICD-10-CM

## 2024-09-30 DIAGNOSIS — E78.00 HYPERCHOLESTEROLEMIA: ICD-10-CM

## 2024-09-30 DIAGNOSIS — E66.813 CLASS 3 SEVERE OBESITY WITH SERIOUS COMORBIDITY AND BODY MASS INDEX (BMI) OF 40.0 TO 44.9 IN ADULT, UNSPECIFIED OBESITY TYPE (H): ICD-10-CM

## 2024-09-30 DIAGNOSIS — Z79.4 TYPE 2 DIABETES MELLITUS WITH DIABETIC POLYNEUROPATHY, WITH LONG-TERM CURRENT USE OF INSULIN (H): Primary | ICD-10-CM

## 2024-09-30 DIAGNOSIS — N18.31 CHRONIC KIDNEY DISEASE, STAGE 3A (H): ICD-10-CM

## 2024-09-30 PROCEDURE — 99215 OFFICE O/P EST HI 40 MIN: CPT | Performed by: INTERNAL MEDICINE

## 2024-09-30 PROCEDURE — G2211 COMPLEX E/M VISIT ADD ON: HCPCS | Performed by: INTERNAL MEDICINE

## 2024-09-30 RX ORDER — INSULIN LISPRO 200 [IU]/ML
INJECTION, SOLUTION SUBCUTANEOUS
Qty: 72 ML | Refills: 3 | Status: SHIPPED | OUTPATIENT
Start: 2024-09-30

## 2024-09-30 RX ORDER — INSULIN PMP CART,AUT,G6/7,CNTR
1 EACH SUBCUTANEOUS SEE ADMIN INSTRUCTIONS
Qty: 45 EACH | Refills: 3 | Status: SHIPPED | OUTPATIENT
Start: 2024-09-30

## 2024-09-30 RX ORDER — PROCHLORPERAZINE 25 MG/1
SUPPOSITORY RECTAL
Qty: 9 EACH | Refills: 3 | Status: SHIPPED | OUTPATIENT
Start: 2024-09-30

## 2024-09-30 RX ORDER — PROCHLORPERAZINE 25 MG/1
SUPPOSITORY RECTAL
Qty: 1 EACH | Refills: 3 | Status: SHIPPED | OUTPATIENT
Start: 2024-09-30

## 2024-09-30 ASSESSMENT — PAIN SCALES - GENERAL: PAINLEVEL: SEVERE PAIN (6)

## 2024-09-30 NOTE — PROGRESS NOTES
ASSESSMENT/PLAN:    Camacho Bhagat is a 58 year old man w/ PMx of CASTAÑEDA/HCC s/p liver transplant (3/2017), fibromyalgia, DVT, HTN, RONNIE, OA, and CVA who presents for follow-up of his DM-II     1. Type 2 Diabetes  A1c less reliable in the context of mild stable anemia.  Overall, he maintains a very good glycemic control, which has improved since using tirzepatide.  In the future, with weight loss, he might need to have the insulin pump settings adjusted.   Follow-up in 6 months.    2.  Obesity  His weight has remained stable since starting tirzepatide.  Recommended to increase the dose to 7.5 mg weekly and continue to follow-up with the weight management clinic.    3.  Osteopenia  I did not recommend active treatment, since he is only a candidate for Prolia, which is not recommended long-term.    No orders of the defined types were placed in this encounter.    =========================================================================================     Camacho Bhagat is a 58 year old male with PMHx of CASTAÑEDA/HCC s/p liver transplant (3/2017), fibromyalgia, CKD, DVT, HTN, RONNIE, OA, who presents for follow-up of type 2 DM, diagnosed in 2002.  Oral meds first, later placed on insulin.     Related history: h/o CASTAÑEDA cirrhosis and HCC s/p liver transplant 3/2017. Course complicated by acute abdomen/neutropenic fever requiring right hemicolectomy and colostomy Fall 2017. He later underwent colostomy takedown 1/5/18 which was complicated by abdominal wall abscess at a drain site. During this course, he has also developed excessive proteinuria.    Hemoglobin A1c was 6.2% on 7/5/24, in the context of anemia.  I reviewed both insulin pump and sensor records.    Insulin pump (Omnipod 5, May 2023) settings:  Basal rate   12 AM 1.5 units/h  8 AM 1.45 units/h  2 PM 1.4 units/h   6 PM 1.45 units/h   9 PM 1.5 units/h  Insulin to carbohydrate ratio 1 unit per 7.5 g   Sensitivity 25   Blood glucose correction threshold 120  Glucose target  range 110  Active insulin time 3 hours    He uses Humalog 200 pens to fill the pump reservoir.     Total daily insulin dose is 60 units, of which 48% is basal insulin.  On the sensor, average glucose is 135, with a standard deviation of 33 and a coefficient of variation of 24.3%, corresponding to an estimated GMI of 6.5%.  88% of the blood sugar numbers are within target, 1% are above 250 and 1% are in the mild hypoglycemic range.  The pump is used in auto mode 99% of time.    Occasionally, he does enter manual corrections for postprandial hyperglycemia.      Treatment with empagliflozin was started on 1/10/24, at 10 mg daily, by nephrology.    In July 2024, he was started on treatment with Tirzepatide, through weight management clinic. He has been taking it at 5 mg weekly. It doesn't curb his appetite much but it has improved the BG numbers. His weight has remained stable.  He does have longstanding episodes of diarrhea but otherwise he denies nausea, vomiting, abdominal pain or constipation.  He has a follow-up appointment scheduled with the weight management clinic in November.    In the past, he tried Victoza and he was not able to tolerate it due to projectile vomiting.     Related meds:  Prednisone started for transplant and the dose remains 2.5 mg per day.     Diabetes related complications:     1. Retinopathy: No known eye disease  Last eye exam was in 9/24. Note reviewed. It revealed mild nonproliferative retinopathy and macular edema, bilaterally. He was referred to see a retina specialist. S/P right cataract surgery.      2.  Nephropathy: yes. CKD IIIa . He has significant proteinuria and f/up with nephrology.  Recent GFR in the 30s. On losartan, 25 mg daily.      3. Neuropathy.  Gabapentin was prescribed to control the neuropathic pain in the left foot, and back pain which started over 10 years ago.  He has decreased the dose to 300 mg QD. No ulcers or infection. No amputation.  He has bilateral foot  pain. The last appointment with podiatry was in 9/24.      4. Lipids   1/9/24: LDL cholesterol 77, HDL cholesterol 41,    The patient has declines treatment with statins.     In general, he is able to recognize the hypoglycemic episodes: He feels flushed, warm and dizzy.  The lowest blood glucose he remembers experiencing was in the 50s.  He declined having glucagon at home.     LS XRay from 9/13/22 revealed an L1 vertebral compression fracture.  The fracture was not mentioned on the follow-up MRI.   The patient has a longstanding history of back pain, which started in 1993.  The lumbar spine x-ray was recommended as he complained of hip pain, for which he has been receiving intra-articular steroid injections.    DXA 2/2020  The most negative and valid T-score of -1.8 at the level of the right femoral neck. Compared with baseline 2019 exam there was a significant decrease of BMD at the level of the lumbar spine, left total hip and right total hip.  (-2.6%; -17.4%; -18.2%).     DXA 9/2024   I reviewed the DEXA scan images  L1-L3 T-score 1.5, left femoral neck T-score -0.7, right femoral neck T-score -1.2, left total hip T-score -0.4, right total hip T-score -0.3, 33% distal radius T-score 0.5.  FRAX 7.5%/1.2%.     Vitamin D 43 on 2/1/24. He has been taking ? 5000 U daily.   PTH 31 on 9/27/22     Past Medical History  Past Medical History:   Diagnosis Date    Cancer (H) 12/15    Stage 4 liver cancer    Diabetes type 2, controlled (H) 11/10/2016    Esophageal reflux     Fibromyalgia 01/2009    dx with Dr Benitez( Rheum)    Gangrene of finger (H) 08/25/2017    H/O deep venous thrombosis 11/2001    Permanent IVC filter in place.    H/O CASTAÑEDA (nonalcoholic steatohepatitis)     H/O Pneumonia, organism unspecified(486) 10/2001    Included ARDS, sepsis, and  acute renal failure; hospitalized, required tracheostomy placement.    H/O: HTN (hypertension) 11/2001    No longer prescribed antihypertensive medication.       History of hepatocellular carcinoma     History of liver transplant (H)     History of obstructive sleep apnea     No longer wears CPAP since losing approximately 200 pounds with his liver transplant and its complications.      HLD (hyperlipidemia)     Hypertension     Ischemia of both lower extremities 08/25/2017    Distal ischemia due to shock/high pressor requirements    Liver transplant rejection (H) 06/11/2018    Neutropenic colitis (H) 07/04/2017    Osteoarthritis     Presence of PERMANENT IVC filter     Rheumatoid arthritis(714.0)     remission for many years    Squamous cell skin cancer     on back   Osteopenia 2020    Past Surgical History  Past Surgical History:   Procedure Laterality Date    ARTHROSCOPY KNEE WITH MENISCAL REPAIR Right 2008    Right knee arthroscopy    BENCH LIVER N/A 03/04/2017    Procedure: BENCH LIVER;  Surgeon: Jovan Tran MD;  Location: [facility]    BIOPSY  5/2017    Liver    CHOLECYSTECTOMY      COLONOSCOPY N/A 07/21/2017    Procedure: COMBINED COLONOSCOPY, SINGLE OR MULTIPLE BIOPSY/POLYPECTOMY BY BIOPSY;  Colonoscopy;  Surgeon: Izaiah Montes MD;  [facility]    COLONOSCOPY N/A 10/24/2022    Procedure: COLONOSCOPY, WITH POLYPECTOMY AND BIOPSY;  Surgeon: Abril Mcneill MD;  Location: [facility]    ENDOSCOPIC RETROGRADE CHOLANGIOPANCREATOGRAM N/A 05/22/2018    Procedure: COMBINED ENDOSCOPIC RETROGRADE CHOLANGIOPANCREATOGRAPHY, PLACE TUBE/STENT;  Endoscopic Retrograde Cholangiopancreatography with sphincterotomy and pancreatic duct stent placement;  Surgeon: Zay Gaitan MD;  Location: [facility]    ENT SURGERY      Repair of deviated septum    ESOPHAGOSCOPY, GASTROSCOPY, DUODENOSCOPY (EGD), COMBINED N/A 08/04/2016    Procedure: COMBINED ESOPHAGOSCOPY, GASTROSCOPY, DUODENOSCOPY (EGD), BIOPSY SINGLE OR MULTIPLE;  Surgeon: Trent Pederson MD;  Location:  GI    ESOPHAGOSCOPY, GASTROSCOPY, DUODENOSCOPY (EGD), COMBINED N/A 08/27/2017    Procedure: COMBINED  ESOPHAGOSCOPY, GASTROSCOPY, DUODENOSCOPY (EGD);;  Surgeon: Los Wynn MD;  [facility]: [facility]    ESOPHAGOSCOPY, GASTROSCOPY, DUODENOSCOPY (EGD), COMBINED N/A 08/17/2023    Procedure: Esophagoscopy, gastroscopy, duodenoscopy (EGD), combined;  Surgeon: Trent Villanueva MD;  Location: [facility]    INCISION AND DRAINAGE ABDOMEN WASHOUT, COMBINED Right 02/14/2018    Procedure: COMBINED INCISION AND DRAINAGE ABDOMEN WASHOUT;  COMBINED INCISION AND DRAINAGE right ABDOMEN flank;  Surgeon: Sara Dinh MD;  [facility]    LAPAROTOMY EXPLORATORY N/A 07/04/2017    Procedure: LAPAROTOMY EXPLORATORY;  Exploratory Laparotomy, washout;  Surgeon: Tip Zhang MD;  [facility]    LAPAROTOMY EXPLORATORY N/A 07/05/2017    Procedure: LAPAROTOMY EXPLORATORY;  Exploratory Laparotomy, Washout with closure.;  Surgeon: Tip Zhang MD;  [facility]    LAPAROTOMY EXPLORATORY N/A 07/26/2017    Procedure: LAPAROTOMY EXPLORATORY;  Exploratory Laparotomy, Right angelique-colectomy, end ileostomy, mucosal fistula, partial omentectomy;  Surgeon: Sara Dinh MD;  [facility]    LASIK      ORTHOPEDIC SURGERY Right     Repair of dislocated wrist.      PHACOEMULSIFICATION CLEAR CORNEA WITH STANDARD INTRAOCULAR LENS IMPLANT Right 2/1/2024    Procedure: RIGHT EYE PHACOEMULSIFICATION, COMPLEX CATARACT, WITH INTRAOCULAR LENS IMPLANT;  Surgeon: Stan Roque MD;  Location: [facility] OR    RELEASE TRIGGER FINGER Right 11/10/2017    Procedure: RELEASE TRIGGER FINGER;  Debride, V-Y Flap Right Index Finger;  Surgeon: Momo Noonan MD;  [facility]    TAKEDOWN ILEOSTOMY N/A 01/05/2018    Procedure: TAKEDOWN ILEOSTOMY;  Exploratory Laparotomy, Lysis of Adhesions, Takedown Of End Ileostomy, Takedown of mucocutaneous fistula, ileocolic resection  And Colorectal Anastomosis, Primary repair of Ventral Hernia, Anesthesia Block ;  Surgeon: Sara Dinh MD;  [facility]: [facility]     TRACHEOSTOMY      During hospitalization for pneumonia.      TRANSPLANT LIVER RECIPIENT  DONOR N/A 2017    Procedure: TRANSPLANT LIVER RECIPIENT  DONOR;  Surgeon: Jovan Tran MD;  [facility]      Medications    Current Outpatient Medications:     alcohol swab prep pads, Use to swab area of injection/francisca as directed., Disp: 100 each, Rfl: 3    alpha-lipoic acid 600 MG capsule, Take 600 mg by mouth, Disp: , Rfl:     amLODIPine (NORVASC) 10 MG tablet, Take 1 tablet (10 mg) by mouth daily, Disp: 90 tablet, Rfl: 3    augmented betamethasone dipropionate (DIPROLENE-AF) 0.05 % external ointment, Apply twice daily as needed for rash on the legs, Disp: 45 g, Rfl: 11    cholecalciferol (VITAMIN D3) 1000 UNIT tablet, Take 1 tablet (1,000 Units) by mouth daily (Patient taking differently: Take 1,000 Units by mouth every morning.), Disp: 100 tablet, Rfl: 3    Continuous Blood Gluc Sensor (DEXCOM G6 SENSOR) MISC, Change every 10 days., Disp: 9 each, Rfl: 3    Continuous Blood Gluc Transmit (DEXCOM G6 TRANSMITTER) MISC, Change every 3 months., Disp: 1 each, Rfl: 3    cyclobenzaprine (FLEXERIL) 10 MG tablet, Take 1 tablet (10 mg) by mouth 3 times daily as needed for muscle spasms, Disp: 90 tablet, Rfl: 3    doxazosin (CARDURA) 8 MG tablet, Take 1 tablet (8 mg) by mouth at bedtime Take a total of 10 mg at bedtime, Disp: 90 tablet, Rfl: 3    empagliflozin (JARDIANCE) 10 MG TABS tablet, Take 1 tablet (10 mg) by mouth daily., Disp: 90 tablet, Rfl: 1    fluticasone (FLONASE) 50 MCG/ACT nasal spray, Spray 1 spray into both nostrils daily, Disp: 20 mL, Rfl: 3    furosemide (LASIX) 40 MG tablet, Take 1 tablet (40 mg) by mouth daily ., Disp: 90 tablet, Rfl: 3    gabapentin (NEURONTIN) 800 MG tablet, Take 1 tablet (800 mg) by mouth 3 times daily, Disp: 90 tablet, Rfl: 11    hydrOXYzine (ATARAX) 25 MG tablet, Take 1 tablet (25 mg) by mouth 3 times daily as needed for itching, Disp: 90 tablet, Rfl:  3    Insulin Disposable Pump (OMNIPOD 5 G6 INTRO, GEN 5,) KIT, 1 kit daily, Disp: 1 kit, Rfl: 0    Insulin Disposable Pump (OMNIPOD 5 G6 POD, GEN 5,) MISC, 1 each See Admin Instructions Change every 2 days. Use for insulin administration., Disp: 45 each, Rfl: 3    Insulin Disposable Pump (OMNIPOD 5 G6 POD, GEN 5,) MISC, 1 each See Admin Instructions Use per 's instructions., Disp: 1 each, Rfl: 1    Insulin Lispro (HUMALOG KWIKPEN) 200 UNIT/ML soln, To be used in the insulin pump. Total daily dose up to 170 U., Disp: 72 mL, Rfl: 3    insulin pen needle (BD ENE U/F) 32G X 4 MM miscellaneous, Use 1 pen needle 4 times daily or as directed., Disp: 400 each, Rfl: 1    lidocaine (LIDODERM) 5 % patch, Place 1 patch onto the skin every 24 hours To prevent lidocaine toxicity, patient should be patch free for 12 hrs daily., Disp: 30 patch, Rfl: 11    lidocaine (XYLOCAINE) 5 % external ointment, Apply topically as needed for moderate pain, Disp: 90 g, Rfl: 11    losartan (COZAAR) 25 MG tablet, Take 1 tablet (25 mg) by mouth daily, Disp: 90 tablet, Rfl: 3    methocarbamol (ROBAXIN) 500 MG tablet, Take 1 tablet (500 mg) by mouth 2 times daily as needed for muscle spasms, Disp: 60 tablet, Rfl: 0    metoprolol succinate ER (TOPROL XL) 200 MG 24 hr tablet, Take 1 tablet (200 mg) by mouth daily, Disp: 90 tablet, Rfl: 3    mycophenolic acid (GENERIC EQUIVALENT) 360 MG EC tablet, Take 2 tablets (720 mg) by mouth every 12 hours, Disp: 360 tablet, Rfl: 3    oxyCODONE (ROXICODONE) 5 MG tablet, Take 1 tablet (5 mg) by mouth 4 times daily as needed for pain maximum 6 tablet(s) per day, Disp: 30 tablet, Rfl: 0    predniSONE (DELTASONE) 2.5 MG tablet, Take 1 tablet (2.5 mg) by mouth daily, Disp: 90 tablet, Rfl: 3    sildenafil (REVATIO) 20 MG tablet, Take 2 tablets (40 mg) by mouth daily as needed (erectile dysfunction), Disp: 10 tablet, Rfl: 11    tacrolimus (GENERIC EQUIVALENT) 1 MG capsule, Take 4 capsules (4 mg) by mouth  every morning AND 3 capsules (3 mg) every evening., Disp: 630 capsule, Rfl: 3    tirzepatide (MOUNJARO) 2.5 MG/0.5ML pen, Inject 2.5 mg Subcutaneous every 7 days, Disp: 2 mL, Rfl: 1    tirzepatide (MOUNJARO) 5 MG/0.5ML pen, Inject 5 mg subcutaneously every 7 days., Disp: 2 mL, Rfl: 2    tretinoin (RETIN-A) 0.025 % external cream, Apply a pea-sized amount evenly over the face at nighttime before bed, Disp: 45 g, Rfl: 11    triamcinolone (KENALOG) 0.1 % external ointment, Apply topically 2 times daily to the itching on the legs, Disp: 80 g, Rfl: 1    ursodiol (ACTIGALL) 500 MG tablet, Take 1 tablet (500 mg) by mouth 2 times daily, Disp: 180 tablet, Rfl: 3    Current Facility-Administered Medications:     bevacizumab (AVASTIN) intravitreal inj 1.25 mg, 1.25 mg, Intravitreal, Q28 Days, Stan Roque MD    bevacizumab (AVASTIN) intravitreal inj 1.25 mg, 1.25 mg, Intravitreal, Q28 Days, Stan oRque MD    Social history:   . Lives alone. Former . 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  Former smoker for 2 years, 0.75 packs/day, quit in 1988.  He used to chew tobacco and he stopped this in 2015.  He has not been drinking any alcohol since the liver transplant.    OBJECTIVE:    Wt Readings from Last 10 Encounters:   09/30/24 131.5 kg (290 lb)   09/09/24 130.8 kg (288 lb 4.8 oz)   08/12/24 130.4 kg (287 lb 6.4 oz)   07/10/24 129.3 kg (285 lb)   07/03/24 131.3 kg (289 lb 6.4 oz)   03/04/24 131.6 kg (290 lb 3.2 oz)   02/12/24 131.7 kg (290 lb 6.4 oz)   02/05/24 131.5 kg (290 lb)   02/01/24 132.5 kg (292 lb)   01/24/24 132.6 kg (292 lb 6.4 oz)     /78   Pulse 71   Wt 131.5 kg (290 lb)   SpO2 98%   BMI 39.32 kg/m       Physical exam  General appearance: No distress noted, General obesity.  Normal male hair pattern.   Eyes: conjunctivae and extraocular motions are normal. Pupils are equal, round, and reactive to light. No lid lag, no stare.  HENT: oropharynx clear  and moist; neck no JVD, no bruits, no thyromegaly, no palpable nodules  Cardiovascular: regular rhythm, no murmurs, distal pulses palpable, no edema  Respiratory: chest clear, no rales, no rhonchi  Musculoskeletal: normal tone and strength   Neurologic: Absent knee reflexes, no resting tremor  Psychiatric: affect and judgment normal     Lab Results   Component Value Date    A1C 6.2 (H) 07/05/2024    A1C 5.8 (H) 03/01/2024    A1C 6.2 (H) 11/30/2023    A1C 5.7 (H) 07/31/2023    A1C 6.0 (H) 09/27/2022    A1C 6.3 (H) 03/31/2021    A1C 6.7 (H) 02/12/2021    A1C 6.4 (H) 11/24/2020    A1C 6.2 (H) 10/06/2020    A1C 5.5 04/11/2018    HEMOGLOBINA1 5.3 12/06/2021    HEMOGLOBINA1 5.8 11/18/2019    HEMOGLOBINA1 6.1 (A) 11/19/2018       Hemoglobin   Date Value Ref Range Status   09/16/2024 10.7 (L) 13.3 - 17.7 g/dL Final   05/27/2021 11.9 (L) 13.3 - 17.7 g/dL Final     Hematocrit   Date Value Ref Range Status   09/16/2024 30.9 (L) 40.0 - 53.0 % Final   05/27/2021 34.7 (L) 40.0 - 53.0 % Final     Cholesterol   Date Value Ref Range Status   01/09/2024 155 <200 mg/dL Final   11/24/2020 134 <200 mg/dL Final     Cholesterol/HDL Ratio   Date Value Ref Range Status   07/03/2012 3.8 0.0 - 5.0 Final     HDL Cholesterol   Date Value Ref Range Status   11/24/2020 39 (L) >39 mg/dL Final     Direct Measure HDL   Date Value Ref Range Status   01/09/2024 41 >=40 mg/dL Final     LDL Cholesterol Calculated   Date Value Ref Range Status   01/09/2024 77 <=100 mg/dL Final   11/24/2020 35 <100 mg/dL Final     Comment:     Desirable:       <100 mg/dl     VLDL-Cholesterol   Date Value Ref Range Status   07/03/2012 36 (H) 0 - 30 mg/dL Final     Triglycerides   Date Value Ref Range Status   01/09/2024 185 (H) <150 mg/dL Final   11/24/2020 298 (H) <150 mg/dL Final     Comment:     Borderline high:  150-199 mg/dl  High:             200-499 mg/dl  Very high:       >499 mg/dl       Albumin Urine mg/L   Date Value Ref Range Status   10/27/2017 122 mg/L Final      TSH   Date Value Ref Range Status   04/19/2023 3.12 0.30 - 4.20 uIU/mL Final   04/02/2018 2.74 0.40 - 4.00 mU/L Final     Last Basic Metabolic Panel:    Sodium   Date Value Ref Range Status   09/16/2024 136 135 - 145 mmol/L Final   06/08/2021 134 133 - 144 mmol/L Final     Potassium   Date Value Ref Range Status   09/16/2024 4.3 3.4 - 5.3 mmol/L Final   01/30/2023 5.3 3.4 - 5.3 mmol/L Final   06/08/2021 4.9 3.4 - 5.3 mmol/L Final     Chloride   Date Value Ref Range Status   09/16/2024 102 98 - 107 mmol/L Final   01/30/2023 114 (H) 94 - 109 mmol/L Final   06/08/2021 105 94 - 109 mmol/L Final     Calcium   Date Value Ref Range Status   09/16/2024 8.8 8.8 - 10.4 mg/dL Final     Comment:     Reference intervals for this test were updated on 7/16/2024 to reflect our healthy population more accurately. There may be differences in the flagging of prior results with similar values performed with this method. Those prior results can be interpreted in the context of the updated reference intervals.   06/08/2021 8.9 8.5 - 10.1 mg/dL Final     Carbon Dioxide   Date Value Ref Range Status   06/08/2021 23 20 - 32 mmol/L Final     Carbon Dioxide (CO2)   Date Value Ref Range Status   09/16/2024 19 (L) 22 - 29 mmol/L Final   01/30/2023 22 20 - 32 mmol/L Final     Urea Nitrogen   Date Value Ref Range Status   09/16/2024 36.8 (H) 8.0 - 23.0 mg/dL Final   01/30/2023 48 (H) 7 - 30 mg/dL Final   06/08/2021 43 (H) 7 - 30 mg/dL Final     Creatinine   Date Value Ref Range Status   09/16/2024 2.04 (H) 0.67 - 1.17 mg/dL Final   06/08/2021 1.51 (H) 0.66 - 1.25 mg/dL Final     GFR Estimate   Date Value Ref Range Status   09/16/2024 37 (L) >60 mL/min/1.73m2 Final     Comment:     eGFR calculated using 2021 CKD-EPI equation.   06/08/2021 51 (L) >60 mL/min/[1.73_m2] Final     Comment:     Non  GFR Calc  Starting 12/18/2018, serum creatinine based estimated GFR (eGFR) will be   calculated using the Chronic Kidney Disease  Epidemiology Collaboration   (CKD-EPI) equation.       Glucose   Date Value Ref Range Status   09/16/2024 224 (H) 70 - 99 mg/dL Final   01/30/2023 78 70 - 99 mg/dL Final   06/08/2021 249 (H) 70 - 99 mg/dL Final     GLUCOSE BY METER POCT   Date Value Ref Range Status   02/01/2024 86 70 - 99 mg/dL Final       AST   Date Value Ref Range Status   09/16/2024 20 0 - 45 U/L Final   05/27/2021 19 0 - 45 U/L Final     ALT   Date Value Ref Range Status   09/16/2024 18 0 - 70 U/L Final   05/27/2021 32 0 - 70 U/L Final     Albumin Urine mg/g Cr   Date Value Ref Range Status   10/27/2017 132.46 (H) 0 - 17 mg/g Cr Final       41 minutes spent on the date of the encounter doing chart review, history and exam, documentation and further activities as noted above.  The longitudinal plan of care for the diagnosis(es)/condition(s) as documented were addressed during this visit. Due to the added complexity in care, I will continue to support Camacho in the subsequent management and with ongoing continuity of care.

## 2024-09-30 NOTE — NURSING NOTE
"Chief Complaint   Patient presents with    Diabetes     Vital signs:      BP: (!) 144/83 Pulse: 71     SpO2: 98 %       Weight: 131.5 kg (290 lb)  Estimated body mass index is 39.32 kg/m  as calculated from the following:    Height as of 7/31/24: 1.829 m (6' 0.01\").    Weight as of this encounter: 131.5 kg (290 lb).      Vital signs:      BP: 138/78 Pulse: 71     SpO2: 98 %       Weight: 131.5 kg (290 lb)  Estimated body mass index is 39.32 kg/m  as calculated from the following:    Height as of 7/31/24: 1.829 m (6' 0.01\").    Weight as of this encounter: 131.5 kg (290 lb).        "

## 2024-09-30 NOTE — TELEPHONE ENCOUNTER
PA Initiation    Medication: DEXCOM G6 SENSOR MISC  Insurance Company: [employer] - Phone [phone] Fax [phone]  Pharmacy Filling the Rx:    Filling Pharmacy Phone:    Filling Pharmacy Fax:    Start Date: 9/30/2024    [id number]

## 2024-09-30 NOTE — LETTER
9/30/2024       RE: Camacho Bhagat  [address]  [facility] [address]     Dear Colleague,    Thank you for referring your patient, Camacho Bhagat, to the [facility] [place] [facility]. Please see a copy of my visit note below.      Blood Glucose Monitoring :                     Pump Settings:             ASSESSMENT/PLAN:    Camacho Bhagat is a 58 year old man w/ PMx of CASTAÑEDA/HCC s/p liver transplant (3/2017), fibromyalgia, DVT, HTN, RONNIE, OA, and CVA who presents for follow-up of his DM-II     1. Type 2 Diabetes  A1c less reliable in the context of mild stable anemia.  Overall, he maintains a very good glycemic control, which has improved since using tirzepatide.  In the future, with weight loss, he might need to have the insulin pump settings adjusted.   Follow-up in 6 months.    2.  Obesity  His weight has remained stable since starting tirzepatide.  Recommended to increase the dose to 7.5 mg weekly and continue to follow-up with the weight management clinic.    3.  Osteopenia  I did not recommend active treatment, since he is only a candidate for Prolia, which is not recommended long-term.    No orders of the defined types were placed in this encounter.    =========================================================================================     Camacho Bhagat is a 58 year old male with PMHx of CASTAÑEDA/HCC s/p liver transplant (3/2017), fibromyalgia, CKD, DVT, HTN, RONNIE, OA, who presents for follow-up of type 2 DM, diagnosed in 2002.  Oral meds first, later placed on insulin.     Related history: h/o CASTAÑEDA cirrhosis and HCC s/p liver transplant 3/2017. Course complicated by acute abdomen/neutropenic fever requiring right hemicolectomy and colostomy Fall 2017. He later underwent colostomy takedown 1/5/18 which was complicated by abdominal wall abscess at a drain site. During this course, he has also developed excessive  proteinuria.    Hemoglobin A1c was 6.2% on 7/5/24, in the context of anemia.  I reviewed both insulin pump and sensor records.    Insulin pump (Omnipod 5, May 2023) settings:  Basal rate   12 AM 1.5 units/h  8 AM 1.45 units/h  2 PM 1.4 units/h   6 PM 1.45 units/h   9 PM 1.5 units/h  Insulin to carbohydrate ratio 1 unit per 7.5 g   Sensitivity 25   Blood glucose correction threshold 120  Glucose target range 110  Active insulin time 3 hours    He uses Humalog 200 pens to fill the pump reservoir.     Total daily insulin dose is 60 units, of which 48% is basal insulin.  On the sensor, average glucose is 135, with a standard deviation of 33 and a coefficient of variation of 24.3%, corresponding to an estimated GMI of 6.5%.  88% of the blood sugar numbers are within target, 1% are above 250 and 1% are in the mild hypoglycemic range.  The pump is used in auto mode 99% of time.    Occasionally, he does enter manual corrections for postprandial hyperglycemia.      Treatment with empagliflozin was started on 1/10/24, at 10 mg daily, by nephrology.    In July 2024, he was started on treatment with Tirzepatide, through weight management clinic. He has been taking it at 5 mg weekly. It doesn't curb his appetite much but it has improved the BG numbers. His weight has remained stable.  He does have longstanding episodes of diarrhea but otherwise he denies nausea, vomiting, abdominal pain or constipation.  He has a follow-up appointment scheduled with the weight management clinic in November.    In the past, he tried Victoza and he was not able to tolerate it due to projectile vomiting.     Related meds:  Prednisone started for transplant and the dose remains 2.5 mg per day.     Diabetes related complications:     1. Retinopathy: No known eye disease  Last eye exam was in 9/24. Note reviewed. It revealed mild nonproliferative retinopathy and macular edema, bilaterally. He was referred to see a retina specialist. S/P right cataract  surgery.      2.  Nephropathy: yes. CKD IIIa . He has significant proteinuria and f/up with nephrology.  Recent GFR in the 30s. On losartan, 25 mg daily.      3. Neuropathy.  Gabapentin was prescribed to control the neuropathic pain in the left foot, and back pain which started over 10 years ago.  He has decreased the dose to 300 mg QD. No ulcers or infection. No amputation.  He has bilateral foot pain. The last appointment with podiatry was in 9/24.      4. Lipids   1/9/24: LDL cholesterol 77, HDL cholesterol 41,    The patient has declines treatment with statins.     In general, he is able to recognize the hypoglycemic episodes: He feels flushed, warm and dizzy.  The lowest blood glucose he remembers experiencing was in the 50s.  He declined having glucagon at home.     LS XRay from 9/13/22 revealed an L1 vertebral compression fracture.  The fracture was not mentioned on the follow-up MRI.   The patient has a longstanding history of back pain, which started in 1993.  The lumbar spine x-ray was recommended as he complained of hip pain, for which he has been receiving intra-articular steroid injections.    DXA 2/2020  The most negative and valid T-score of -1.8 at the level of the right femoral neck. Compared with baseline 2019 exam there was a significant decrease of BMD at the level of the lumbar spine, left total hip and right total hip.  (-2.6%; -17.4%; -18.2%).     DXA 9/2024   I reviewed the DEXA scan images  L1-L3 T-score 1.5, left femoral neck T-score -0.7, right femoral neck T-score -1.2, left total hip T-score -0.4, right total hip T-score -0.3, 33% distal radius T-score 0.5.  FRAX 7.5%/1.2%.     Vitamin D 43 on 2/1/24. He has been taking ? 5000 U daily.   PTH 31 on 9/27/22     Past Medical History  Past Medical History:   Diagnosis Date     Cancer (H) 12/15    Stage 4 liver cancer     Diabetes type 2, controlled (H) 11/10/2016     Esophageal reflux      Fibromyalgia 01/2009    dx with Dr Benitez(  Rheum)     Gangrene of finger (H) 08/25/2017     H/O deep venous thrombosis 11/2001    Permanent IVC filter in place.     H/O CASTAÑEDA (nonalcoholic steatohepatitis)      H/O Pneumonia, organism unspecified(486) 10/2001    Included ARDS, sepsis, and  acute renal failure; hospitalized, required tracheostomy placement.     H/O: HTN (hypertension) 11/2001    No longer prescribed antihypertensive medication.       History of hepatocellular carcinoma      History of liver transplant (H)      History of obstructive sleep apnea     No longer wears CPAP since losing approximately 200 pounds with his liver transplant and its complications.       HLD (hyperlipidemia)      Hypertension      Ischemia of both lower extremities 08/25/2017    Distal ischemia due to shock/high pressor requirements     Liver transplant rejection (H) 06/11/2018     Neutropenic colitis (H) 07/04/2017     Osteoarthritis      Presence of PERMANENT IVC filter      Rheumatoid arthritis(714.0)     remission for many years     Squamous cell skin cancer     on back   Osteopenia 2020    Past Surgical History  Past Surgical History:   Procedure Laterality Date     ARTHROSCOPY KNEE WITH MENISCAL REPAIR Right 2008    Right knee arthroscopy     BENCH LIVER N/A 03/04/2017    Procedure: BENCH LIVER;  Surgeon: Jovan Tran MD;  Location: [facility] OR     BIOPSY  5/2017    Liver     CHOLECYSTECTOMY       COLONOSCOPY N/A 07/21/2017    Procedure: COMBINED COLONOSCOPY, SINGLE OR MULTIPLE BIOPSY/POLYPECTOMY BY BIOPSY;  Colonoscopy;  Surgeon: Izaiah Montes MD;  Location: [facility] GI     COLONOSCOPY N/A 10/24/2022    Procedure: COLONOSCOPY, WITH POLYPECTOMY AND BIOPSY;  Surgeon: Abril Mcneill MD;  Location: [facility]     ENDOSCOPIC RETROGRADE CHOLANGIOPANCREATOGRAM N/A 05/22/2018    Procedure: COMBINED ENDOSCOPIC RETROGRADE CHOLANGIOPANCREATOGRAPHY, PLACE TUBE/STENT;  Endoscopic Retrograde Cholangiopancreatography with sphincterotomy and pancreatic duct stent placement;   Surgeon: Zay Gaitan MD;  Location: [facility] OR     ENT SURGERY      Repair of deviated septum     ESOPHAGOSCOPY, GASTROSCOPY, DUODENOSCOPY (EGD), COMBINED N/A 08/04/2016    Procedure: COMBINED ESOPHAGOSCOPY, GASTROSCOPY, DUODENOSCOPY (EGD), BIOPSY SINGLE OR MULTIPLE;  Surgeon: Trent Pederson MD;  Location:  GI     ESOPHAGOSCOPY, GASTROSCOPY, DUODENOSCOPY (EGD), COMBINED N/A 08/27/2017    Procedure: COMBINED ESOPHAGOSCOPY, GASTROSCOPY, DUODENOSCOPY (EGD);;  Surgeon: Los Wynn MD;  Location: [facility]     ESOPHAGOSCOPY, GASTROSCOPY, DUODENOSCOPY (EGD), COMBINED N/A 08/17/2023    Procedure: Esophagoscopy, gastroscopy, duodenoscopy (EGD), combined;  Surgeon: Trent Villanueva MD;  Location: [facility]     INCISION AND DRAINAGE ABDOMEN WASHOUT, COMBINED Right 02/14/2018    Procedure: COMBINED INCISION AND DRAINAGE ABDOMEN WASHOUT;  COMBINED INCISION AND DRAINAGE right ABDOMEN flank;  Surgeon: Sara Dinh MD;  Location: [facility]     LAPAROTOMY EXPLORATORY N/A 07/04/2017    Procedure: LAPAROTOMY EXPLORATORY;  Exploratory Laparotomy, washout;  Surgeon: Tip Zhang MD;  [facility]: [facility]     LAPAROTOMY EXPLORATORY N/A 07/05/2017    Procedure: LAPAROTOMY EXPLORATORY;  Exploratory Laparotomy, Washout with closure.;  Surgeon: Tip Zhang MD;  Location: [facility]     LAPAROTOMY EXPLORATORY N/A 07/26/2017    Procedure: LAPAROTOMY EXPLORATORY;  Exploratory Laparotomy, Right angelique-colectomy, end ileostomy, mucosal fistula, partial omentectomy;  Surgeon: Sara Dinh MD;  Location: [facility]     LASIK       ORTHOPEDIC SURGERY Right     Repair of dislocated wrist.       PHACOEMULSIFICATION CLEAR CORNEA WITH STANDARD INTRAOCULAR LENS IMPLANT Right 2/1/2024    Procedure: RIGHT EYE PHACOEMULSIFICATION, COMPLEX CATARACT, WITH INTRAOCULAR LENS IMPLANT;  Surgeon: Stan Roque MD;  Location: [facility] OR     RELEASE TRIGGER FINGER Right 11/10/2017    Procedure: RELEASE TRIGGER FINGER;   Debride, V-Y Flap Right Index Finger;  Surgeon: Momo Noonan MD;  [facility]     TAKEDOWN ILEOSTOMY N/A 2018    Procedure: TAKEDOWN ILEOSTOMY;  Exploratory Laparotomy, Lysis of Adhesions, Takedown Of End Ileostomy, Takedown of mucocutaneous fistula, ileocolic resection  And Colorectal Anastomosis, Primary repair of Ventral Hernia, Anesthesia Block ;  Surgeon: Sara Dinh MD;  Location: [facility] OR     TRACHEOSTOMY  2001    During hospitalization for pneumonia.       TRANSPLANT LIVER RECIPIENT  DONOR N/A 2017    Procedure: TRANSPLANT LIVER RECIPIENT  DONOR;  Surgeon: Jovan Tran MD;  Location: [facility] OR      Medications    Current Outpatient Medications:      alcohol swab prep pads, Use to swab area of injection/francisca as directed., Disp: 100 each, Rfl: 3     alpha-lipoic acid 600 MG capsule, Take 600 mg by mouth, Disp: , Rfl:      amLODIPine (NORVASC) 10 MG tablet, Take 1 tablet (10 mg) by mouth daily, Disp: 90 tablet, Rfl: 3     augmented betamethasone dipropionate (DIPROLENE-AF) 0.05 % external ointment, Apply twice daily as needed for rash on the legs, Disp: 45 g, Rfl: 11     cholecalciferol (VITAMIN D3) 1000 UNIT tablet, Take 1 tablet (1,000 Units) by mouth daily (Patient taking differently: Take 1,000 Units by mouth every morning.), Disp: 100 tablet, Rfl: 3     Continuous Blood Gluc Sensor (DEXCOM G6 SENSOR) MISC, Change every 10 days., Disp: 9 each, Rfl: 3     Continuous Blood Gluc Transmit (DEXCOM G6 TRANSMITTER) MISC, Change every 3 months., Disp: 1 each, Rfl: 3     cyclobenzaprine (FLEXERIL) 10 MG tablet, Take 1 tablet (10 mg) by mouth 3 times daily as needed for muscle spasms, Disp: 90 tablet, Rfl: 3     doxazosin (CARDURA) 8 MG tablet, Take 1 tablet (8 mg) by mouth at bedtime Take a total of 10 mg at bedtime, Disp: 90 tablet, Rfl: 3     empagliflozin (JARDIANCE) 10 MG TABS tablet, Take 1 tablet (10 mg) by mouth daily., Disp: 90 tablet, Rfl: 1      fluticasone (FLONASE) 50 MCG/ACT nasal spray, Spray 1 spray into both nostrils daily, Disp: 20 mL, Rfl: 3     furosemide (LASIX) 40 MG tablet, Take 1 tablet (40 mg) by mouth daily ., Disp: 90 tablet, Rfl: 3     gabapentin (NEURONTIN) 800 MG tablet, Take 1 tablet (800 mg) by mouth 3 times daily, Disp: 90 tablet, Rfl: 11     hydrOXYzine (ATARAX) 25 MG tablet, Take 1 tablet (25 mg) by mouth 3 times daily as needed for itching, Disp: 90 tablet, Rfl: 3     Insulin Disposable Pump (OMNIPOD 5 G6 INTRO, GEN 5,) KIT, 1 kit daily, Disp: 1 kit, Rfl: 0     Insulin Disposable Pump (OMNIPOD 5 G6 POD, GEN 5,) MISC, 1 each See Admin Instructions Change every 2 days. Use for insulin administration., Disp: 45 each, Rfl: 3     Insulin Disposable Pump (OMNIPOD 5 G6 POD, GEN 5,) MISC, 1 each See Admin Instructions Use per 's instructions., Disp: 1 each, Rfl: 1     Insulin Lispro (HUMALOG KWIKPEN) 200 UNIT/ML soln, To be used in the insulin pump. Total daily dose up to 170 U., Disp: 72 mL, Rfl: 3     insulin pen needle (BD ENE U/F) 32G X 4 MM miscellaneous, Use 1 pen needle 4 times daily or as directed., Disp: 400 each, Rfl: 1     lidocaine (LIDODERM) 5 % patch, Place 1 patch onto the skin every 24 hours To prevent lidocaine toxicity, patient should be patch free for 12 hrs daily., Disp: 30 patch, Rfl: 11     lidocaine (XYLOCAINE) 5 % external ointment, Apply topically as needed for moderate pain, Disp: 90 g, Rfl: 11     losartan (COZAAR) 25 MG tablet, Take 1 tablet (25 mg) by mouth daily, Disp: 90 tablet, Rfl: 3     methocarbamol (ROBAXIN) 500 MG tablet, Take 1 tablet (500 mg) by mouth 2 times daily as needed for muscle spasms, Disp: 60 tablet, Rfl: 0     metoprolol succinate ER (TOPROL XL) 200 MG 24 hr tablet, Take 1 tablet (200 mg) by mouth daily, Disp: 90 tablet, Rfl: 3     mycophenolic acid (GENERIC EQUIVALENT) 360 MG EC tablet, Take 2 tablets (720 mg) by mouth every 12 hours, Disp: 360 tablet, Rfl: 3     oxyCODONE  (ROXICODONE) 5 MG tablet, Take 1 tablet (5 mg) by mouth 4 times daily as needed for pain maximum 6 tablet(s) per day, Disp: 30 tablet, Rfl: 0     predniSONE (DELTASONE) 2.5 MG tablet, Take 1 tablet (2.5 mg) by mouth daily, Disp: 90 tablet, Rfl: 3     sildenafil (REVATIO) 20 MG tablet, Take 2 tablets (40 mg) by mouth daily as needed (erectile dysfunction), Disp: 10 tablet, Rfl: 11     tacrolimus (GENERIC EQUIVALENT) 1 MG capsule, Take 4 capsules (4 mg) by mouth every morning AND 3 capsules (3 mg) every evening., Disp: 630 capsule, Rfl: 3     tirzepatide (MOUNJARO) 2.5 MG/0.5ML pen, Inject 2.5 mg Subcutaneous every 7 days, Disp: 2 mL, Rfl: 1     tirzepatide (MOUNJARO) 5 MG/0.5ML pen, Inject 5 mg subcutaneously every 7 days., Disp: 2 mL, Rfl: 2     tretinoin (RETIN-A) 0.025 % external cream, Apply a pea-sized amount evenly over the face at nighttime before bed, Disp: 45 g, Rfl: 11     triamcinolone (KENALOG) 0.1 % external ointment, Apply topically 2 times daily to the itching on the legs, Disp: 80 g, Rfl: 1     ursodiol (ACTIGALL) 500 MG tablet, Take 1 tablet (500 mg) by mouth 2 times daily, Disp: 180 tablet, Rfl: 3    Current Facility-Administered Medications:      bevacizumab (AVASTIN) intravitreal inj 1.25 mg, 1.25 mg, Intravitreal, Q28 Days, Stan Roque MD     bevacizumab (AVASTIN) intravitreal inj 1.25 mg, 1.25 mg, Intravitreal, Q28 Days, Stan Roque MD    Social history:   . Lives alone. Former . 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  Former smoker for 2 years, 0.75 packs/day, quit in 1988.  He used to chew tobacco and he stopped this in 2015.  He has not been drinking any alcohol since the liver transplant.    OBJECTIVE:    Wt Readings from Last 10 Encounters:   09/30/24 131.5 kg (290 lb)   09/09/24 130.8 kg (288 lb 4.8 oz)   08/12/24 130.4 kg (287 lb 6.4 oz)   07/10/24 129.3 kg (285 lb)   07/03/24 131.3 kg (289 lb 6.4 oz)   03/04/24 131.6 kg (290 lb  3.2 oz)   02/12/24 131.7 kg (290 lb 6.4 oz)   02/05/24 131.5 kg (290 lb)   02/01/24 132.5 kg (292 lb)   01/24/24 132.6 kg (292 lb 6.4 oz)     /78   Pulse 71   Wt 131.5 kg (290 lb)   SpO2 98%   BMI 39.32 kg/m       Physical exam  General appearance: No distress noted, General obesity.  Normal male hair pattern.   Eyes: conjunctivae and extraocular motions are normal. Pupils are equal, round, and reactive to light. No lid lag, no stare.  HENT: oropharynx clear and moist; neck no JVD, no bruits, no thyromegaly, no palpable nodules  Cardiovascular: regular rhythm, no murmurs, distal pulses palpable, no edema  Respiratory: chest clear, no rales, no rhonchi  Musculoskeletal: normal tone and strength   Neurologic: Absent knee reflexes, no resting tremor  Psychiatric: affect and judgment normal     Lab Results   Component Value Date    A1C 6.2 (H) 07/05/2024    A1C 5.8 (H) 03/01/2024    A1C 6.2 (H) 11/30/2023    A1C 5.7 (H) 07/31/2023    A1C 6.0 (H) 09/27/2022    A1C 6.3 (H) 03/31/2021    A1C 6.7 (H) 02/12/2021    A1C 6.4 (H) 11/24/2020    A1C 6.2 (H) 10/06/2020    A1C 5.5 04/11/2018    HEMOGLOBINA1 5.3 12/06/2021    HEMOGLOBINA1 5.8 11/18/2019    HEMOGLOBINA1 6.1 (A) 11/19/2018       Hemoglobin   Date Value Ref Range Status   09/16/2024 10.7 (L) 13.3 - 17.7 g/dL Final   05/27/2021 11.9 (L) 13.3 - 17.7 g/dL Final     Hematocrit   Date Value Ref Range Status   09/16/2024 30.9 (L) 40.0 - 53.0 % Final   05/27/2021 34.7 (L) 40.0 - 53.0 % Final     Cholesterol   Date Value Ref Range Status   01/09/2024 155 <200 mg/dL Final   11/24/2020 134 <200 mg/dL Final     Cholesterol/HDL Ratio   Date Value Ref Range Status   07/03/2012 3.8 0.0 - 5.0 Final     HDL Cholesterol   Date Value Ref Range Status   11/24/2020 39 (L) >39 mg/dL Final     Direct Measure HDL   Date Value Ref Range Status   01/09/2024 41 >=40 mg/dL Final     LDL Cholesterol Calculated   Date Value Ref Range Status   01/09/2024 77 <=100 mg/dL Final   11/24/2020  35 <100 mg/dL Final     Comment:     Desirable:       <100 mg/dl     VLDL-Cholesterol   Date Value Ref Range Status   07/03/2012 36 (H) 0 - 30 mg/dL Final     Triglycerides   Date Value Ref Range Status   01/09/2024 185 (H) <150 mg/dL Final   11/24/2020 298 (H) <150 mg/dL Final     Comment:     Borderline high:  150-199 mg/dl  High:             200-499 mg/dl  Very high:       >499 mg/dl       Albumin Urine mg/L   Date Value Ref Range Status   10/27/2017 122 mg/L Final     TSH   Date Value Ref Range Status   04/19/2023 3.12 0.30 - 4.20 uIU/mL Final   04/02/2018 2.74 0.40 - 4.00 mU/L Final     Last Basic Metabolic Panel:    Sodium   Date Value Ref Range Status   09/16/2024 136 135 - 145 mmol/L Final   06/08/2021 134 133 - 144 mmol/L Final     Potassium   Date Value Ref Range Status   09/16/2024 4.3 3.4 - 5.3 mmol/L Final   01/30/2023 5.3 3.4 - 5.3 mmol/L Final   06/08/2021 4.9 3.4 - 5.3 mmol/L Final     Chloride   Date Value Ref Range Status   09/16/2024 102 98 - 107 mmol/L Final   01/30/2023 114 (H) 94 - 109 mmol/L Final   06/08/2021 105 94 - 109 mmol/L Final     Calcium   Date Value Ref Range Status   09/16/2024 8.8 8.8 - 10.4 mg/dL Final     Comment:     Reference intervals for this test were updated on 7/16/2024 to reflect our healthy population more accurately. There may be differences in the flagging of prior results with similar values performed with this method. Those prior results can be interpreted in the context of the updated reference intervals.   06/08/2021 8.9 8.5 - 10.1 mg/dL Final     Carbon Dioxide   Date Value Ref Range Status   06/08/2021 23 20 - 32 mmol/L Final     Carbon Dioxide (CO2)   Date Value Ref Range Status   09/16/2024 19 (L) 22 - 29 mmol/L Final   01/30/2023 22 20 - 32 mmol/L Final     Urea Nitrogen   Date Value Ref Range Status   09/16/2024 36.8 (H) 8.0 - 23.0 mg/dL Final   01/30/2023 48 (H) 7 - 30 mg/dL Final   06/08/2021 43 (H) 7 - 30 mg/dL Final     Creatinine   Date Value Ref Range  Status   09/16/2024 2.04 (H) 0.67 - 1.17 mg/dL Final   06/08/2021 1.51 (H) 0.66 - 1.25 mg/dL Final     GFR Estimate   Date Value Ref Range Status   09/16/2024 37 (L) >60 mL/min/1.73m2 Final     Comment:     eGFR calculated using 2021 CKD-EPI equation.   06/08/2021 51 (L) >60 mL/min/[1.73_m2] Final     Comment:     Non  GFR Calc  Starting 12/18/2018, serum creatinine based estimated GFR (eGFR) will be   calculated using the Chronic Kidney Disease Epidemiology Collaboration   (CKD-EPI) equation.       Glucose   Date Value Ref Range Status   09/16/2024 224 (H) 70 - 99 mg/dL Final   01/30/2023 78 70 - 99 mg/dL Final   06/08/2021 249 (H) 70 - 99 mg/dL Final     GLUCOSE BY METER POCT   Date Value Ref Range Status   02/01/2024 86 70 - 99 mg/dL Final       AST   Date Value Ref Range Status   09/16/2024 20 0 - 45 U/L Final   05/27/2021 19 0 - 45 U/L Final     ALT   Date Value Ref Range Status   09/16/2024 18 0 - 70 U/L Final   05/27/2021 32 0 - 70 U/L Final     Albumin Urine mg/g Cr   Date Value Ref Range Status   10/27/2017 132.46 (H) 0 - 17 mg/g Cr Final       41 minutes spent on the date of the encounter doing chart review, history and exam, documentation and further activities as noted above.  The longitudinal plan of care for the diagnosis(es)/condition(s) as documented were addressed during this visit. Due to the added complexity in care, I will continue to support Camacho in the subsequent management and with ongoing continuity of care.      Again, thank you for allowing me to participate in the care of your patient.      Sincerely,    Alisa West MD

## 2024-10-01 NOTE — TELEPHONE ENCOUNTER
Prior Authorization Approval    Medication: DEXCOM G6 SENSOR MISC  Authorization Effective Date: 10/1/2024  Authorization Expiration Date: 9/30/2025  Approved Dose/Quantity: 3 per 30 days  Reference #: [id number]   Insurance Company: [employer] [phone] Fax [phone]  Expected CoPay: $    CoPay Card Available: No    Financial Assistance Needed:   Which Pharmacy is filling the prescription:    Pharmacy Notified:   Patient Notified:

## 2024-10-02 NOTE — TELEPHONE ENCOUNTER
FUTURE VISIT INFORMATION      FUTURE VISIT INFORMATION:  Date: 10/23/2024  Time: 8:45AM   Location: [facility]   REFERRAL INFORMATION:  Referring provider:  Felicia Singh PA-C   Referring providers clinic:  [facility]   Reason for visit/diagnosis  - SCC (squamous cell carcinoma)     RECORDS REQUESTED FROM:       Clinic name Comments Records Status Imaging Status   Internal biopsy  8/30/2024 internal biopsy   Left lower back:  - Squamous cell carcinoma in situ      8/30/2024 Derm OV- skin cancer

## 2024-10-06 NOTE — PROGRESS NOTES
[facility] Dermatology Note  Encounter Date: Oct 7, 2024  Store-and-Forward and Telephone. Location of teledermatologist: [facility] DERMATOLOGIC SURGERY CLINIC [place].  Start time: 3:05. End time: 3:07.    Dermatologic Surgery Telemedicine Consult Note    Dermatology Problem List:  # History of liver transplant 3/4/17  -tacrolimus, MMF, prednisone  1. SCCis, L lower back, s/p shave bx 08/30/24, pending MMS 10/23/24       CC: Mohs consult  (SCC insitu left lower back)      Subjective: Camacho Bhagat is a 59 year old male who presents today for Mohs micrographic surgery consultation for a recent diagnosis of skin cancer.  - Skin cancer(s): SCCis  - Location(s): Left lower back  - This is his first skin cancer and will be his first MMS procedure.  - no other concerns today       Objective:   Skin: Focused examination of the left lower back within the teledermatology photograph(s) on 08/30/24 was performed.  - Erythematous macule with slight superficial erosions  - No photos provided today. Photo taken on 08/30/24 below      Path report:   Case: [id number]  Date collected: 08/30/24  Final Diagnosis   Left lower back:  - Squamous cell carcinoma in situ       Assessment and Plan:     1. Plan for Mohs micrographic surgery for skin cancer(s) above:  *Review lab result(s): Dermpath report   - We discussed the nature of the diagnosis/condition above. We discussed the treatment options, including the risks benefits and expectations of these options. We recommend micrographic surgery as the most effective and most tissue sparing option for treatment, and the patient agrees to proceed with this.  The patient is aware of the risks, benefits and expectations of this procedure. The patient will be scheduled for this procedure, if not already done so.  - We anticipate the following closure type: Linear closure, such as complex linear closure (CLC)    Patient was discussed with and evaluated by  attending physician Dr. David Gilbert and Dr. Alberto Saha, MDSO fellow, PGY5    Staff Involved:  Staff/Scribe/Fellow    Scribe Disclosure:   I, Joann Balderas, am serving as a scribe; to document services personally performed by David Gilbert MD, based on data collection and the provider's statements to me.     Provider Disclosure:  I agree with the above history, review of systems, physical exam and plan.  I have reviewed the content of the documentation and have edited it as needed. I have personally performed the services documented here and the documentation accurately represents those services and the decisions I have made.      Electronically signed by:  Attending Attestation  I attest that I discussed the case with the Fellow.  I agree with the plan.   I have reviewed the note and edited it as necessary.  I was physically present with the fellow for the entire interview.  David Gilbert M.D.  Professor  Director of Dermatologic Surgery  Department of Dermatology  [facility]

## 2024-10-07 ENCOUNTER — VIRTUAL VISIT (OUTPATIENT)
Dept: DERMATOLOGY | Facility: CLINIC | Age: 59
End: 2024-10-07
Payer: COMMERCIAL

## 2024-10-07 DIAGNOSIS — D04.5 SQUAMOUS CELL CARCINOMA IN SITU (SCCIS) OF SKIN OF TORSO: Primary | ICD-10-CM

## 2024-10-07 PROCEDURE — 99207 PR NO CHARGE LOS: CPT | Mod: 93 | Performed by: DERMATOLOGY

## 2024-10-07 ASSESSMENT — PAIN SCALES - GENERAL: PAINLEVEL: NO PAIN (0)

## 2024-10-07 NOTE — NURSING NOTE
Chief Complaint   Patient presents with    Mohs consult      SCC insitu left lower back     Juanita BEYER CMA

## 2024-10-07 NOTE — LETTER
10/7/2024       RE: Camacho Bhagat  [address]  [facility] [address]     Dear Colleague,    Thank you for referring your patient, Camacho Bhagat, to the [facility] DERMATOLOGIC SURGERY CLINIC [place] at [facility]. Please see a copy of my visit note below.    [facility] Dermatology Note  Encounter Date: Oct 7, 2024  Store-and-Forward and Telephone. Location of teledermatologist: [facility] DERMATOLOGIC SURGERY CLINIC [place].  Start time: 3:05. End time: 3:07.    Dermatologic Surgery Telemedicine Consult Note    Dermatology Problem List:  # History of liver transplant 3/4/17  -tacrolimus, MMF, prednisone  1. SCCis, L lower back, s/p shave bx 08/30/24, pending MMS 10/23/24       CC: Mohs consult  (SCC insitu left lower back)      Subjective: Camacho Bhagat is a 59 year old male who presents today for Mohs micrographic surgery consultation for a recent diagnosis of skin cancer.  - Skin cancer(s): SCCis  - Location(s): Left lower back  - This is his first skin cancer and will be his first MMS procedure.  - no other concerns today       Objective:   Skin: Focused examination of the left lower back within the teledermatology photograph(s) on 08/30/24 was performed.  - Erythematous macule with slight superficial erosions  - No photos provided today. Photo taken on 08/30/24 below      Path report:   Case: [id number]  Date collected: 08/30/24  Final Diagnosis   Left lower back:  - Squamous cell carcinoma in situ       Assessment and Plan:     1. Plan for Mohs micrographic surgery for skin cancer(s) above:  *Review lab result(s): Dermpath report   - We discussed the nature of the diagnosis/condition above. We discussed the treatment options, including the risks benefits and expectations of these options. We recommend micrographic surgery as the most effective and most tissue sparing option for treatment, and the patient agrees to  proceed with this.  The patient is aware of the risks, benefits and expectations of this procedure. The patient will be scheduled for this procedure, if not already done so.  - We anticipate the following closure type: Linear closure, such as complex linear closure (CLC)    Patient was discussed with and evaluated by attending physician Dr. David Gilbert and Dr. Alberto Saha, MDSO fellow, PGY5    Staff Involved:  Staff/Scribe/Fellow    Scribe Disclosure:   I, Joann [facility], am serving as a scribe; to document services personally performed by David Gilbert MD, based on data collection and the provider's statements to me.     Provider Disclosure:  I agree with the above history, review of systems, physical exam and plan.  I have reviewed the content of the documentation and have edited it as needed. I have personally performed the services documented here and the documentation accurately represents those services and the decisions I have made.      Electronically signed by:  Attending Attestation  I attest that I discussed the case with the Fellow.  I agree with the plan.   I have reviewed the note and edited it as necessary.  I was physically present with the fellow for the entire interview.  David Gilbert M.D.  Professor  Director of Dermatologic Surgery  Department of Dermatology  [facility]        Again, thank you for allowing me to participate in the care of your patient.      Sincerely,    David Gilbert MD

## 2024-10-23 ENCOUNTER — PRE VISIT (OUTPATIENT)
Dept: DERMATOLOGY | Facility: CLINIC | Age: 59
End: 2024-10-23

## 2024-10-23 ENCOUNTER — OFFICE VISIT (OUTPATIENT)
Dept: DERMATOLOGY | Facility: CLINIC | Age: 59
End: 2024-10-23
Payer: COMMERCIAL

## 2024-10-23 ENCOUNTER — TELEPHONE (OUTPATIENT)
Dept: DERMATOLOGY | Facility: CLINIC | Age: 59
End: 2024-10-23

## 2024-10-23 VITALS — DIASTOLIC BLOOD PRESSURE: 91 MMHG | SYSTOLIC BLOOD PRESSURE: 159 MMHG | HEART RATE: 67 BPM

## 2024-10-23 DIAGNOSIS — C44.92 SCC (SQUAMOUS CELL CARCINOMA): ICD-10-CM

## 2024-10-23 DIAGNOSIS — D04.5 SQUAMOUS CELL CARCINOMA IN SITU OF SKIN OF TRUNK: Primary | ICD-10-CM

## 2024-10-23 PROCEDURE — 17313 MOHS 1 STAGE T/A/L: CPT | Performed by: DERMATOLOGY

## 2024-10-23 PROCEDURE — 12032 INTMD RPR S/A/T/EXT 2.6-7.5: CPT | Performed by: DERMATOLOGY

## 2024-10-23 ASSESSMENT — PAIN SCALES - GENERAL: PAINLEVEL_OUTOF10: NO PAIN (0)

## 2024-10-23 NOTE — PROGRESS NOTES
[facility] Dermatologic Surgery Clinic [place] Procedure Note    Dermatology Problem List:  # History of liver transplant 3/4/17  -tacrolimus, MMF, prednisone  1. SCCis, L lower back, s/p shave bx 08/30/24, s/p MMS 10/23/24    Date of Service:  Oct 23, 2024  Surgery: Mohs micrographic surgery    Case 1  Repair Type: intermediate  Repair Size: 3.7 cm  Suture Material: Monocryl 4-0  Tumor Type: SCCI - Squamous cell carcinoma in situ  Location: Left lower back  Derm-Path Accession #: [id number]  PreOp Size: 2.0 x 1.0 cm  PostOp Size: 3.0 x 2.5 cm  Mohs Accession #:   Level of Defect: fat      Procedure:  We discussed the principles of treatment and most likely complications including scarring, bleeding, infection, swelling, pain, crusting, nerve damage, large wound,  incomplete excision, wound dehiscence,  nerve damage, recurrence, and a second procedure may be recommended to obtain the best cosmetic or functional result.    Informed consent was obtained and the patient underwent the procedure as follows:  The patient was placed prone on the operating table.  The cancer was identified, outlined with a marker, and verified by the patient.  The entire surgical field was prepped with Hibiclens.  The surgical site was anesthetized using Lidocaine 1% with epi 1:100,000.      The area of clinically apparent tumor was not debulked. The layer of tissue was then surgically excised using a #15 blade and was then transferred onto a specimen sheet maintaining the orientation of the specimen. Hemostasis was obtained using heat cautery. The wound site was then covered with a dressing while the tissue samples were processed for examination.    The excised tissue was transported to the Mohs histology laboratory maintaining the tissue orientation.  The tissue specimen was relaxed so that the entire surgical margin was in a a single horizontal plane for sectioning and inked for precise mapping.  A precise reference map  was drawn to reflect the sectioning of the specimen, colored inking of the margins, and orientation on the patient. The tissue was processed using horizontal sectioning of the base and continuous peripheral margins.  The histopathologic sections were reviewed in conjunction with the reference map.    Total blocks: 1    Total slides:  3    There were no cancer cells visualized on examination, therefore Mohs surgery was complete.     Reconstruction: Intermediate Linear Closure      The patient was taken to the operative suite and placed prone on the operating room table. The defect was identified. Appropriate markings were made with a marking pen to plan the repair. The area was infiltrated with Lidocaine 1% with epi 1:100,000 and prepped with Hibiclens and draped with sterile towels.     The wound was debeveled and undermined widely. Cones were excised within relaxed skin tension lines on both sides of the defect. Hemostasis was obtained using heat cautery. The deeper layers of subcutaneous and superficial (nonmuscle) fascia tissues were then approximated using monocryl 4-0 buried vertical mattress sutures (deep layer suturing). The wound edges were then approximated; additional buried sutures were placed in a similar fashion where needed. Monocryl 4-0 running subcuticular sutures were carefully placed for maximum eversion and meticulous approximation.      Estimated blood loss was less than 10 ml for all surgical sites. A sterile pressure dressing was applied and wound care instructions, with a written handout, were given. The patient was discharged from the Dermatologic Surgery Center alert and ambulatory.    The patient elected for pathology results to automatically release and understands that the clinical staff will contact them as soon as possible to notify them of the results.    Repair Size: 3.7 cm    The wound was cleansed with saline and ointment was applied along the wound surface.     A sterile pressure  dressing was applied.  Wound care instructions were given verbally and in writing.  The patient left the operating suite in stable condition.  Patient was informed that additional refinement of the resulting surgical scar may be used as a second stage of this reconstruction.     The attending surgeon was present for entire minor procedure and examination.      Staff Involved:  Staff/Scribe/Fellow    Scribe Disclosure:   I, Joann Balderas, am serving as a scribe; to document services personally performed by David Gilbert MD, based on data collection and the provider's statements to me.     Provider Disclosure:  I agree with the above history, review of systems, physical exam and plan.  I have reviewed the content of the documentation and have edited it as needed. I have personally performed the services documented here and the documentation accurately represents those services and the decisions I have made.      Electronically signed by:    Attending attestation:  I was present for key elements of the procedure and immediately available for all other portions of the procedure.  I have reviewed the note and edited it as necessary.    David Gilbert M.D.  Professor  Director of Dermatologic Surgery  Department of Dermatology  [facility]    Dermatology Surgery Clinic  [facility]  [address] [place], [place] [id number]

## 2024-10-23 NOTE — LETTER
10/23/2024       RE: Camacho Bhagat  [address] [facility] [address]     Dear Colleague,    Thank you for referring your patient, Camacho Bhagat, to the [facility] DERMATOLOGIC SURGERY CLINIC [place] at [facility]. Please see a copy of my visit note below.    [facility] Dermatologic Surgery Clinic [place] Procedure Note    Dermatology Problem List:  # History of liver transplant 3/4/17  -tacrolimus, MMF, prednisone  1. SCCis, L lower back, s/p shave bx 08/30/24, s/p MMS 10/23/24    Date of Service:  Oct 23, 2024  Surgery: Mohs micrographic surgery    Case 1  Repair Type: intermediate  Repair Size: 3.7 cm  Suture Material: Monocryl 4-0  Tumor Type: SCCI - Squamous cell carcinoma in situ  Location: Left lower back  Derm-Path Accession #: [id number]  PreOp Size: 2.0 x 1.0 cm  PostOp Size: 3.0 x 2.5 cm  Mohs Accession #:   Level of Defect: fat      Procedure:  We discussed the principles of treatment and most likely complications including scarring, bleeding, infection, swelling, pain, crusting, nerve damage, large wound,  incomplete excision, wound dehiscence,  nerve damage, recurrence, and a second procedure may be recommended to obtain the best cosmetic or functional result.    Informed consent was obtained and the patient underwent the procedure as follows:  The patient was placed prone on the operating table.  The cancer was identified, outlined with a marker, and verified by the patient.  The entire surgical field was prepped with Hibiclens.  The surgical site was anesthetized using Lidocaine 1% with epi 1:100,000.      The area of clinically apparent tumor was not debulked. The layer of tissue was then surgically excised using a #15 blade and was then transferred onto a specimen sheet maintaining the orientation of the specimen. Hemostasis was obtained using heat cautery. The wound site was then covered with a dressing while the  tissue samples were processed for examination.    The excised tissue was transported to the Mohs histology laboratory maintaining the tissue orientation.  The tissue specimen was relaxed so that the entire surgical margin was in a a single horizontal plane for sectioning and inked for precise mapping.  A precise reference map was drawn to reflect the sectioning of the specimen, colored inking of the margins, and orientation on the patient. The tissue was processed using horizontal sectioning of the base and continuous peripheral margins.  The histopathologic sections were reviewed in conjunction with the reference map.    Total blocks: 1    Total slides:  3    There were no cancer cells visualized on examination, therefore Mohs surgery was complete.     Reconstruction: Intermediate Linear Closure      The patient was taken to the operative suite and placed prone on the operating room table. The defect was identified. Appropriate markings were made with a marking pen to plan the repair. The area was infiltrated with Lidocaine 1% with epi 1:100,000 and prepped with Hibiclens and draped with sterile towels.     The wound was debeveled and undermined widely. Cones were excised within relaxed skin tension lines on both sides of the defect. Hemostasis was obtained using heat cautery. The deeper layers of subcutaneous and superficial (nonmuscle) fascia tissues were then approximated using monocryl 4-0 buried vertical mattress sutures (deep layer suturing). The wound edges were then approximated; additional buried sutures were placed in a similar fashion where needed. Monocryl 4-0 running subcuticular sutures were carefully placed for maximum eversion and meticulous approximation.      Estimated blood loss was less than 10 ml for all surgical sites. A sterile pressure dressing was applied and wound care instructions, with a written handout, were given. The patient was discharged from the Dermatologic Surgery Center alert  and ambulatory.    The patient elected for pathology results to automatically release and understands that the clinical staff will contact them as soon as possible to notify them of the results.    Repair Size: 3.7 cm    The wound was cleansed with saline and ointment was applied along the wound surface.     A sterile pressure dressing was applied.  Wound care instructions were given verbally and in writing.  The patient left the operating suite in stable condition.  Patient was informed that additional refinement of the resulting surgical scar may be used as a second stage of this reconstruction.     The attending surgeon was present for entire minor procedure and examination.      Staff Involved:  Staff/Scribe/Fellow    Scribe Disclosure:   I, Joann Balderas, am serving as a scribe; to document services personally performed by David Gilbert MD, based on data collection and the provider's statements to me.     Provider Disclosure:  I agree with the above history, review of systems, physical exam and plan.  I have reviewed the content of the documentation and have edited it as needed. I have personally performed the services documented here and the documentation accurately represents those services and the decisions I have made.      Electronically signed by:    Attending attestation:  I was present for key elements of the procedure and immediately available for all other portions of the procedure.  I have reviewed the note and edited it as necessary.    David Gilbert M.D.  Professor  Director of Dermatologic Surgery  Department of Dermatology  [facility]    Dermatology Surgery Clinic  [facility] [place] [id number]      Again, thank you for allowing me to participate in the care of your patient.      Sincerely,    David Gilbert MD

## 2024-10-23 NOTE — NURSING NOTE
Chief Complaint   Patient presents with    Procedure     Mohs and reconstruction to left lower back     Juanita BEYER CMA

## 2024-10-23 NOTE — TELEPHONE ENCOUNTER
Follow up call completed following Mohs procedure with Dr. Gilbert.       Are you having pain? no   Are you taking pain medication? no  Are you applying ice?  no  Have you had any noticeable bleeding through the bandage? no   Do you have any other concerns? no       Please call [phone] if you have any questions or concerns.  Mandie ESPINOZA RN  Dermatology Surgery  [phone]

## 2024-10-23 NOTE — PATIENT INSTRUCTIONS
Wound care    I will experience scar, bleeding, swelling, pain, crusting and redness. I may experience incomplete removal or recurrence. Risks are bleeding, bruising, swelling, infection, nerve damage, & a large wound. A second procedure may be recommended to obtain the best cosmetic or functional result.       A three month office visit with your Surgeon is recommended for scar evaluation. Please reach out sooner if you have concerns about you surgical site/wound.    Caring for your skin after surgery    After your surgery, a pressure bandage will be placed over the area that has stitches. This is important to prevent bleeding. Please follow these instructions over the next 1 to 2 weeks. Following this regimen will help to prevent complications as your wound heals.     For the first 48 hours after your surgery:    Leave the pressure dressing on and keep it dry. If it should come loose, you may re-tape it, but do not take it off.  Relax and take it easy. Do not do any vigorous exercise or heavy lifting. This could cause the wound to bleed.  Post-Operative pain is usually mild. If you are able to take tylenol, You may take plain or extra-strength Tylenol (acetaminophen) As directed on the bottle (do not take more than 4,000mg in one day). If you are able to take ibuprofen, you can alternate the tylenol and ibuprofen.   Avoid alcohol as this may increase your tendency to bleed.   You may put an ice pack around the bandaged area for 20 minutes at a time as needed. This may help reduce swelling, bruising, and pain. Make sure the ice pack is waterproof so that the pressure bandage doesn t get wet.  If the wound is on the face try to sleep with your head elevated. Either in a recliner or propped up in bed, this will decrease swelling around the eyes.   You may see a small amount of drainage or blood on your pressure bandage. This is normal. However:  If drainage or bleeding continues or saturates the bandage, you will  "need to apply firm pressure over the bandage with a piece of gauze for 15 minutes.  If bleeding continues after applying pressure for 15 minutes, apply an ice pack to the bandaged area for 15 minutes.  If bleeding still continues, call our office or go to the nearest emergency room.    Remove pressure dressing 48 hours after surgery:    Carefully remove the pressure bandage. If it seems sticky or too difficult to get off, you may need to soak it off in the shower.  After the pressure dressing is removed, you may shower and get the wound wet. However, Do Not let the forceful stream of the shower hit the wound directly.  Follow these wound care and dressing change instructions:    You have steri strips, skin glue (purplish/clear), and tegaderm (clear film) over your incision line, you can shower.   You may allow water to run over the site. Do not soak.  Do Not rub or scrub the site.  Pat dry after the shower or bath.  Avoid topical medications, lotions, creams, ointment,or oils.  Do not use tanning lamps or expose the site to sun.   Check wound appearance daily, some swelling and redness is normal after a procedure but should go away as your would is healing. If the swelling and redness or pain increases or if any other signs of infection occur listed below please send in a photo via my chart and or call us to let us know.  The clear film should start peeling off approximately around 2 weeks. By this time your wound should be sufficiently healed. If it still looks to be healing when the glue comes off you can clean the wound with soapy warm water daily in the shower and apply Vaseline to the incision line.   Once the clear film starts peeling off to the point where water is getting trapped under the clear film, please gently peel off.        If you are able to take acetaminophen (\"Tylenol,\" etc.) and ibuprofen (\"Advil\" or \"Motrin,\" etc.), then you may STAGGER these medications by taking 400 mg of ibuprofen (usually " two tabs) every 8 hours and 1,000 mg of acetaminophen (e.g., two tabs of extra-strength Tylenol) every 8 hours.    This means, for example, that you could take the followin,000 mg of Tylenol, followed 4 hours later by 400 mg Ibuprofen, followed 4 hours later with 1,000 mg of Tylenol, followed 4 hours later by 400 mg Ibuprofen, followed 4 hours later with 1,000 mg of Tylenol, and so forth.     Essentially, you can take either 1,000 mg of Tylenol or 400 mg of ibuprofen in alternating fashion EVERY FOUR HOURS.    Do NOT exceed more than 4,000 mg of Tylenol or 3,200 mg of ibuprofen per 24 hours. If you are not able to take Tylenol or ibuprofen as above due to other health issues (or a physician has told you directly that you are not allowed to take one of them, say due to pre-existing severe liver or kidney issues), then disregard the above directions.    Scientific evidence supports that this combination/schedule of pain medications is just as effective, if not more effective, than taking a narcotic pain medicine.       Follow up will be a 3 month scar evaluation either in person or via a telephone visit with you sending in a photo via [facility]. Unless you have been told to follow up sooner or if you have concerns and would like to be see sooner. Please call or send us in a MYR message if possible and attach a photo.        What to expect:    The first couple of days your wound may be tender and may bleed slightly when doing wound care.  There may be swelling and bruising around the wound, especially if it is near the eyes. For your comfort, you may apply ice or cold compresses to the bruises after your have removed the pressure bandage.  The area around your wound may be numb for several weeks or even months.  You may experience periodic sharp pain or mild itching around the wound as it heals.   The suture line will look dark for a while but will lighten over time.       When to call us:    You have bleeding  that will not stop after applying pressure and ice.  You have pain that is not controlled with Tylenol (acetaminophen.)  You have signs or symptoms of an infection such as:  Fever over 100 degrees Fahrenheit  Redness, warmth or foul-smelling drainage from the wound  If you have any questions, or are not sure how to take care of the wound.    Phone numbers:    During business hours (M-F 8:00-4:30 p.m.)  [facility]-  [phone] Option 1 appt. Desk and ask for the Dermatology Surgery Team  [phone] [facility] .     ---------------------------------------------------------  Evenings/Weekends/Holidays  Hospital - [phone]   TTY for hearing clyewfbi-[phone]  *Ask  to page dermatologist on-call  Emergency Be[facility]-[phone]  TTY for hearing impaired- [phone]

## 2024-10-28 ENCOUNTER — OFFICE VISIT (OUTPATIENT)
Dept: ORTHOPEDICS | Facility: CLINIC | Age: 59
End: 2024-10-28
Payer: COMMERCIAL

## 2024-10-28 ENCOUNTER — ALLIED HEALTH/NURSE VISIT (OUTPATIENT)
Dept: DERMATOLOGY | Facility: CLINIC | Age: 59
End: 2024-10-28
Payer: COMMERCIAL

## 2024-10-28 DIAGNOSIS — L03.032 CELLULITIS OF TOE OF LEFT FOOT: Primary | ICD-10-CM

## 2024-10-28 DIAGNOSIS — L60.0 ONYCHOCRYPTOSIS: ICD-10-CM

## 2024-10-28 DIAGNOSIS — Z51.89 ENCOUNTER FOR WOUND RE-CHECK: Primary | ICD-10-CM

## 2024-10-28 PROCEDURE — 99213 OFFICE O/P EST LOW 20 MIN: CPT | Mod: 25 | Performed by: PODIATRIST

## 2024-10-28 PROCEDURE — 87205 SMEAR GRAM STAIN: CPT | Performed by: DERMATOLOGY

## 2024-10-28 PROCEDURE — 87077 CULTURE AEROBIC IDENTIFY: CPT | Performed by: DERMATOLOGY

## 2024-10-28 PROCEDURE — 17250 CHEM CAUT OF GRANLTJ TISSUE: CPT | Performed by: PODIATRIST

## 2024-10-28 PROCEDURE — 99207 PR NO CHARGE NURSE ONLY: CPT | Performed by: DERMATOLOGY

## 2024-10-28 PROCEDURE — 99000 SPECIMEN HANDLING OFFICE-LAB: CPT | Performed by: PATHOLOGY

## 2024-10-28 RX ORDER — DOXYCYCLINE 100 MG/1
100 CAPSULE ORAL 2 TIMES DAILY
Qty: 10 CAPSULE | Refills: 0 | Status: SHIPPED | OUTPATIENT
Start: 2024-10-28

## 2024-10-28 NOTE — NURSING NOTE
Chief Complaint   Patient presents with    RECHECK     Wound check, concerned for infection. Patient states his coworkers noticed the area is more red and warm to the touch.        Camacho Bhagat comes into clinic today at the request of Dr. Gilbert Ordering Provider for wound check.    Wound appears to have slight redness increasing more than 2cm around incision. Minimal serous drainage. Patient reports area is more painful today, especially when the area is touched. Culture taken of wound on back today. Patient also reports he will be starting a course of doxycycline prescribed today for a toe infection.     This service provided today was under the supervising provider of the day Dr. David Gilbert, who was available if needed.    Magdalene Hedrick RN

## 2024-10-28 NOTE — LETTER
10/28/2024      Camacho Bhagat  [address] [facility] [address]      Dear Colleague,    Thank you for referring your patient, Camacho Bhagat, to the [facility] [place]. Please see a copy of my visit note below.    Chief Complaint   Patient presents with     Follow Up     Left hallux nail       HPI: Camacho is a 59 year old male who presents today for an ingrown nail on the left lateral hallux. Has been red for about a week.     Review of Systems: No n/v/d/f/c/ns/sob/cp    PMH:   Past Medical History:   Diagnosis Date     Cancer (H) 12/15    Stage 4 liver cancer     Diabetes type 2, controlled (H) 11/10/2016     Esophageal reflux      Fibromyalgia 01/2009    dx with Dr Benitez( Rhe[facility])     Gangrene of finger (H) 08/25/2017     H/O deep venous thrombosis 11/2001    Permanent IVC filter in place.     H/O CASTAÑEDA (nonalcoholic steatohepatitis)      H/O Pneumonia, organism unspecified(486) 10/2001    Included ARDS, sepsis, and  acute renal failure; hospitalized, required tracheostomy placement.     H/O: HTN (hypertension) 11/2001    No longer prescribed antihypertensive medication.       History of hepatocellular carcinoma      History of liver transplant (H)      History of obstructive sleep apnea     No longer wears CPAP since losing approximately 200 pounds with his liver transplant and its complications.       HLD (hyperlipidemia)      Hypertension      Ischemia of both lower extremities 08/25/2017    Distal ischemia due to shock/high pressor requirements     Liver transplant rejection (H) 06/11/2018     Neutropenic colitis (H) 07/04/2017     Osteoarthritis      Presence of PERMANENT IVC filter      Rheumatoid arthritis(714.0)     remission for many years     Squamous cell skin cancer     on back       PSxH:   Past Surgical History:   Procedure Laterality Date     ARTHROSCOPY KNEE WITH MENISCAL REPAIR Right 2008    Right knee arthroscopy     BENCH LIVER N/A 03/04/2017    Procedure:  BENCH LIVER;  Surgeon: Jovan Tran MD;  Location: [facility]     BIOPSY  5/2017    Liver     CHOLECYSTECTOMY       COLONOSCOPY N/A 07/21/2017    Procedure: COMBINED COLONOSCOPY, SINGLE OR MULTIPLE BIOPSY/POLYPECTOMY BY BIOPSY;  Colonoscopy;  Surgeon: Izaiah Montes MD;  Location: [facility]     COLONOSCOPY N/A 10/24/2022    Procedure: COLONOSCOPY, WITH POLYPECTOMY AND BIOPSY;  Surgeon: Abril Mcniell MD;  Location: [facility]     ENDOSCOPIC RETROGRADE CHOLANGIOPANCREATOGRAM N/A 05/22/2018    Procedure: COMBINED ENDOSCOPIC RETROGRADE CHOLANGIOPANCREATOGRAPHY, PLACE TUBE/STENT;  Endoscopic Retrograde Cholangiopancreatography with sphincterotomy and pancreatic duct stent placement;  Surgeon: Zay Gatian MD;  Location: [facility]     ENT SURGERY      Repair of deviated septum     ESOPHAGOSCOPY, GASTROSCOPY, DUODENOSCOPY (EGD), COMBINED N/A 08/04/2016    Procedure: COMBINED ESOPHAGOSCOPY, GASTROSCOPY, DUODENOSCOPY (EGD), BIOPSY SINGLE OR MULTIPLE;  Surgeon: Trent Pederson MD;  Location:  GI     ESOPHAGOSCOPY, GASTROSCOPY, DUODENOSCOPY (EGD), COMBINED N/A 08/27/2017    Procedure: COMBINED ESOPHAGOSCOPY, GASTROSCOPY, DUODENOSCOPY (EGD);;  Surgeon: Los Wynn MD;  [facility]     ESOPHAGOSCOPY, GASTROSCOPY, DUODENOSCOPY (EGD), COMBINED N/A 08/17/2023    Procedure: Esophagoscopy, gastroscopy, duodenoscopy (EGD), combined;  Surgeon: Trent Villanueva MD;  [facility]     INCISION AND DRAINAGE ABDOMEN WASHOUT, COMBINED Right 02/14/2018    Procedure: COMBINED INCISION AND DRAINAGE ABDOMEN WASHOUT;  COMBINED INCISION AND DRAINAGE right ABDOMEN flank;  Surgeon: Sara Dinh MD;  [facility]     LAPAROTOMY EXPLORATORY N/A 07/04/2017    Procedure: LAPAROTOMY EXPLORATORY;  Exploratory Laparotomy, washout;  Surgeon: Tip Zhang MD;  [facility]     LAPAROTOMY EXPLORATORY N/A 07/05/2017    Procedure: LAPAROTOMY EXPLORATORY;  Exploratory Laparotomy, Washout with closure.;   Surgeon: Tip Zhang MD;  [facility]     LAPAROTOMY EXPLORATORY N/A 2017    Procedure: LAPAROTOMY EXPLORATORY;  Exploratory Laparotomy, Right angelique-colectomy, end ileostomy, mucosal fistula, partial omentectomy;  Surgeon: Sara Dinh MD;  [facility]: [facility]     LASIK       ORTHOPEDIC SURGERY Right     Repair of dislocated wrist.       PHACOEMULSIFICATION CLEAR CORNEA WITH STANDARD INTRAOCULAR LENS IMPLANT Right 2024    Procedure: RIGHT EYE PHACOEMULSIFICATION, COMPLEX CATARACT, WITH INTRAOCULAR LENS IMPLANT;  Surgeon: Stan Roque MD;  Location: [facility] OR     RELEASE TRIGGER FINGER Right 11/10/2017    Procedure: RELEASE TRIGGER FINGER;  Debride, V-Y Flap Right Index Finger;  Surgeon: Momo Noonan MD;  [facility]     TAKEDOWN ILEOSTOMY N/A 2018    Procedure: TAKEDOWN ILEOSTOMY;  Exploratory Laparotomy, Lysis of Adhesions, Takedown Of End Ileostomy, Takedown of mucocutaneous fistula, ileocolic resection  And Colorectal Anastomosis, Primary repair of Ventral Hernia, Anesthesia Block ;  Surgeon: Sara Dinh MD;  [facility]: [facility]     TRACHEOSTOMY  2001    During hospitalization for pneumonia.       TRANSPLANT LIVER RECIPIENT  DONOR N/A 2017    Procedure: TRANSPLANT LIVER RECIPIENT  DONOR;  Surgeon: Jovan Tran MD;  Location: [facility]       Allergies: Erythromycin, Losartan, and Vioxx    SH:   Social History     Socioeconomic History     Marital status:      Spouse name: Not on file     Number of children: 1     Years of education: Not on file     Highest education level: Not on file   Occupational History     Occupation:     Tobacco Use     Smoking status: Former     Current packs/day: 0.00     Average packs/day: 0.5 packs/day for 1 year (0.5 ttl pk-yrs)     Types: Cigarettes, Cigars, Dip, chew, snus or snuff     Start date: 1987     Quit date: 1987     Years since quittin.3      Smokeless tobacco: Former     Types: Chew     Quit date: 10/8/2015     Tobacco comments:     1 Cigar once every other month.   Substance and Sexual Activity     Alcohol use: Not Currently     Comment: No alcohol since liver transplant.       Drug use: Never     Sexual activity: Yes     Partners: Female     Birth control/protection: None   Other Topics Concern     Parent/sibling w/ CABG, MI or angioplasty before 65F 55M? 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         Social Drivers of Health     Financial Resource Strain: Low Risk  (8/8/2024)    Financial Resource Strain      Within the past 12 months, have you or your family members you live with been unable to get utilities (heat, electricity) when it was really needed?: No   Food Insecurity: Low Risk  (8/8/2024)    Food Insecurity      Within the past 12 months, did you worry that your food would run out before you got money to buy more?: No      Within the past 12 months, did the food you bought just not last and you didn t have money to get more?: No   Transportation Needs: Low Risk  (8/8/2024)    Transportation Needs      Within the past 12 months, has lack of transportation kept you from medical appointments, getting your medicines, non-medical meetings or appointments, work, or from getting things that you need?: No   Physical Activity: Not on file   Stress: Not on file   Social Connections: Not on file   Interpersonal Safety: Low Risk  (1/24/2024)    Interpersonal Safety      Do you feel physically and emotionally safe where you currently live?: Yes      Within the past 12 months, have you been hit, slapped, kicked or otherwise physically hurt by someone?: No      Within the past 12 months, have you been humiliated or emotionally abused in other ways by your partner or ex-partner?: No   Housing Stability: Low Risk  (8/8/2024)    Housing Stability      Do you have housing? : Yes      Are you worried about  losing your housing?: No       FH:   Family History   Problem Relation Age of Onset     Arthritis Mother      Thyroid Cancer Mother         Survivor!     Thyroid Disease Mother         Thyroid cancer     Diabetes Father      Substance Abuse Father      Cervical Cancer Maternal Grandmother      Skin Cancer Maternal Grandfather      Cerebrovascular Disease Maternal Grandfather      No Known Problems Paternal Grandmother      Prostate Cancer Paternal Grandfather      Colon Cancer No family hx of      Hyperlipidemia No family hx of      Coronary Artery Disease No family hx of      Breast Cancer No family hx of      Glaucoma No family hx of      Hypertension No family hx of      Macular Degeneration No family hx of        Objective:  Data Unavailable Data Unavailable Data Unavailable Data Unavailable Data Unavailable 0 lbs 0 oz    PT and DP pulses are 2/4 bilaterally. CRT is instant. Diminished pedal hair.   Gross sensation is diminished bilaterally. Protective sensation is diminished as demonstrated with a 5.07G monofilament.   Equinus is noted bilaterally. No pain with active or passive ROM of the ankle, MTJ, 1st ray, or halluces bilaterally,. When standing on the inserts, he is being rolled outward, onto the 5th met heads.   Nails elongated bilaterally. Left hallux nail is incurvated with a granuloma on the lateral border. He has neuropathy, so a deep slantback was performed on the offending nail and the granuloma was cauterized with silver nitrate. No open lesions are noted.     Assessment:   Encounter Diagnoses   Name Primary?     Cellulitis of toe of left foot Yes           Plan:  - Pt seen and evaluated   Slantback on the left hallux.   - Moisturize daily.  - Start doxycycline.   - See again in 3 months.            Again, thank you for allowing me to participate in the care of your patient.        Sincerely,        Leo Joshi DPM

## 2024-10-29 DIAGNOSIS — E11.3213 TYPE 2 DIABETES MELLITUS WITH BOTH EYES AFFECTED BY MILD NONPROLIFERATIVE RETINOPATHY AND MACULAR EDEMA, WITH LONG-TERM CURRENT USE OF INSULIN (H): Primary | ICD-10-CM

## 2024-10-29 DIAGNOSIS — Z79.4 TYPE 2 DIABETES MELLITUS WITH BOTH EYES AFFECTED BY MILD NONPROLIFERATIVE RETINOPATHY AND MACULAR EDEMA, WITH LONG-TERM CURRENT USE OF INSULIN (H): Primary | ICD-10-CM

## 2024-10-29 NOTE — PROGRESS NOTES
Chief Complaint   Patient presents with    Follow Up     Left hallux nail       HPI: Camacho is a 59 year old male who presents today for an ingrown nail on the left lateral hallux. Has been red for about a week.     Review of Systems: No n/v/d/f/c/ns/sob/cp    PMH:   Past Medical History:   Diagnosis Date    Cancer (H) 12/15    Stage 4 liver cancer    Diabetes type 2, controlled (H) 11/10/2016    Esophageal reflux     Fibromyalgia 01/2009    dx with Dr Benitez( Rheum)    Gangrene of finger (H) 08/25/2017    H/O deep venous thrombosis 11/2001    Permanent IVC filter in place.    H/O CASTAÑEDA (nonalcoholic steatohepatitis)     H/O Pneumonia, organism unspecified(486) 10/2001    Included ARDS, sepsis, and  acute renal failure; hospitalized, required tracheostomy placement.    H/O: HTN (hypertension) 11/2001    No longer prescribed antihypertensive medication.      History of hepatocellular carcinoma     History of liver transplant (H)     History of obstructive sleep apnea     No longer wears CPAP since losing approximately 200 pounds with his liver transplant and its complications.      HLD (hyperlipidemia)     Hypertension     Ischemia of both lower extremities 08/25/2017    Distal ischemia due to shock/high pressor requirements    Liver transplant rejection (H) 06/11/2018    Neutropenic colitis (H) 07/04/2017    Osteoarthritis     Presence of PERMANENT IVC filter     Rheumatoid arthritis(714.0)     remission for many years    Squamous cell skin cancer     on back       PSxH:   Past Surgical History:   Procedure Laterality Date    ARTHROSCOPY KNEE WITH MENISCAL REPAIR Right 2008    Right knee arthroscopy    BENCH LIVER N/A 03/04/2017    Procedure: BENCH LIVER;  Surgeon: Jovan Tran MD;  Location: [facility]    BIOPSY  5/2017    Liver    CHOLECYSTECTOMY      COLONOSCOPY N/A 07/21/2017    Procedure: COMBINED COLONOSCOPY, SINGLE OR MULTIPLE BIOPSY/POLYPECTOMY BY BIOPSY;  Colonoscopy;  Surgeon: Izaiah Montes MD;   [facility]    COLONOSCOPY N/A 10/24/2022    Procedure: COLONOSCOPY, WITH POLYPECTOMY AND BIOPSY;  Surgeon: Abril Mcneill MD;  [facility]    ENDOSCOPIC RETROGRADE CHOLANGIOPANCREATOGRAM N/A 05/22/2018    Procedure: COMBINED ENDOSCOPIC RETROGRADE CHOLANGIOPANCREATOGRAPHY, PLACE TUBE/STENT;  Endoscopic Retrograde Cholangiopancreatography with sphincterotomy and pancreatic duct stent placement;  Surgeon: Zay Gaitan MD;  Location:  OR    ENT SURGERY      Repair of deviated septum    ESOPHAGOSCOPY, GASTROSCOPY, DUODENOSCOPY (EGD), COMBINED N/A 08/04/2016    Procedure: COMBINED ESOPHAGOSCOPY, GASTROSCOPY, DUODENOSCOPY (EGD), BIOPSY SINGLE OR MULTIPLE;  Surgeon: Trent Pederson MD;  Location:  GI    ESOPHAGOSCOPY, GASTROSCOPY, DUODENOSCOPY (EGD), COMBINED N/A 08/27/2017    Procedure: COMBINED ESOPHAGOSCOPY, GASTROSCOPY, DUODENOSCOPY (EGD);;  Surgeon: Los Wynn MD;  Location:  GI    ESOPHAGOSCOPY, GASTROSCOPY, DUODENOSCOPY (EGD), COMBINED N/A 08/17/2023    Procedure: Esophagoscopy, gastroscopy, duodenoscopy (EGD), combined;  Surgeon: Trent Villanueva MD;  Location:  GI    INCISION AND DRAINAGE ABDOMEN WASHOUT, COMBINED Right 02/14/2018    Procedure: COMBINED INCISION AND DRAINAGE ABDOMEN WASHOUT;  COMBINED INCISION AND DRAINAGE right ABDOMEN flank;  Surgeon: Sara Dinh MD;  [facility]    LAPAROTOMY EXPLORATORY N/A 07/04/2017    Procedure: LAPAROTOMY EXPLORATORY;  Exploratory Laparotomy, washout;  Surgeon: Tip Zhang MD;  [facility]    LAPAROTOMY EXPLORATORY N/A 07/05/2017    Procedure: LAPAROTOMY EXPLORATORY;  Exploratory Laparotomy, Washout with closure.;  Surgeon: Tip Zhang MD;  [facility]    LAPAROTOMY EXPLORATORY N/A 07/26/2017    Procedure: LAPAROTOMY EXPLORATORY;  Exploratory Laparotomy, Right angelique-colectomy, end ileostomy, mucosal fistula, partial omentectomy;  Surgeon: Sara Dinh MD;  [facility]: [facility]     LASIK      ORTHOPEDIC SURGERY Right     Repair of dislocated wrist.      PHACOEMULSIFICATION CLEAR CORNEA WITH STANDARD INTRAOCULAR LENS IMPLANT Right 2024    Procedure: RIGHT EYE PHACOEMULSIFICATION, COMPLEX CATARACT, WITH INTRAOCULAR LENS IMPLANT;  Surgeon: Stan Roque MD;  Location: [facility]    RELEASE TRIGGER FINGER Right 11/10/2017    Procedure: RELEASE TRIGGER FINGER;  Debride, V-Y Flap Right Index Finger;  Surgeon: Momo Noonan MD;  [facility]    TAKEDOWN ILEOSTOMY N/A 2018    Procedure: TAKEDOWN ILEOSTOMY;  Exploratory Laparotomy, Lysis of Adhesions, Takedown Of End Ileostomy, Takedown of mucocutaneous fistula, ileocolic resection  And Colorectal Anastomosis, Primary repair of Ventral Hernia, Anesthesia Block ;  Surgeon: Sara Dinh MD;  Location: [facility]    TRACHEOSTOMY  2001    During hospitalization for pneumonia.      TRANSPLANT LIVER RECIPIENT  DONOR N/A 2017    Procedure: TRANSPLANT LIVER RECIPIENT  DONOR;  Surgeon: Jovan Tran MD;  Location: [facility]       Allergies: Erythromycin, Losartan, and Vioxx    SH:   Social History     Socioeconomic History    Marital status:      Spouse name: Not on file    Number of children: 1    Years of education: Not on file    Highest education level: Not on file   Occupational History    Occupation:     Tobacco Use    Smoking status: Former     Current packs/day: 0.00     Average packs/day: 0.5 packs/day for 1 year (0.5 ttl pk-yrs)     Types: Cigarettes, Cigars, Dip, chew, snus or snuff     Start date: 1987     Quit date: 1987     Years since quittin.3    Smokeless tobacco: Former     Types: Chew     Quit date: 10/8/2015    Tobacco comments:     1 Cigar once every other month.   Substance and Sexual Activity    Alcohol use: Not Currently     Comment: No alcohol since liver transplant.      Drug use: Never    Sexual activity: Yes     Partners: Female      Birth control/protection: None   Other Topics Concern    Parent/sibling w/ CABG, MI or angioplasty before 65F 55M? 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         Social Drivers of Health     Financial Resource Strain: Low Risk  (8/8/2024)    Financial Resource Strain     Within the past 12 months, have you or your family members you live with been unable to get utilities (heat, electricity) when it was really needed?: No   Food Insecurity: Low Risk  (8/8/2024)    Food Insecurity     Within the past 12 months, did you worry that your food would run out before you got money to buy more?: No     Within the past 12 months, did the food you bought just not last and you didn t have money to get more?: No   Transportation Needs: Low Risk  (8/8/2024)    Transportation Needs     Within the past 12 months, has lack of transportation kept you from medical appointments, getting your medicines, non-medical meetings or appointments, work, or from getting things that you need?: No   Physical Activity: Not on file   Stress: Not on file   Social Connections: Not on file   Interpersonal Safety: Low Risk  (1/24/2024)    Interpersonal Safety     Do you feel physically and emotionally safe where you currently live?: Yes     Within the past 12 months, have you been hit, slapped, kicked or otherwise physically hurt by someone?: No     Within the past 12 months, have you been humiliated or emotionally abused in other ways by your partner or ex-partner?: No   Housing Stability: Low Risk  (8/8/2024)    Housing Stability     Do you have housing? : Yes     Are you worried about losing your housing?: No       FH:   Family History   Problem Relation Age of Onset    Arthritis Mother     Thyroid Cancer Mother         Survivor!    Thyroid Disease Mother         Thyroid cancer    Diabetes Father     Substance Abuse Father     Cervical Cancer Maternal Grandmother     Skin Cancer Maternal Grandfather      Cerebrovascular Disease Maternal Grandfather     No Known Problems Paternal Grandmother     Prostate Cancer Paternal Grandfather     Colon Cancer No family hx of     Hyperlipidemia No family hx of     Coronary Artery Disease No family hx of     Breast Cancer No family hx of     Glaucoma No family hx of     Hypertension No family hx of     Macular Degeneration No family hx of        Objective:  Data Unavailable Data Unavailable Data Unavailable Data Unavailable Data Unavailable 0 lbs 0 oz    PT and DP pulses are 2/4 bilaterally. CRT is instant. Diminished pedal hair.   Gross sensation is diminished bilaterally. Protective sensation is diminished as demonstrated with a 5.07G monofilament.   Equinus is noted bilaterally. No pain with active or passive ROM of the ankle, MTJ, 1st ray, or halluces bilaterally,. When standing on the inserts, he is being rolled outward, onto the 5th met heads.   Nails elongated bilaterally. Left hallux nail is incurvated with a granuloma on the lateral border. He has neuropathy, so a deep slantback was performed on the offending nail and the granuloma was cauterized with silver nitrate. No open lesions are noted.     Assessment:   Encounter Diagnoses   Name Primary?    Cellulitis of toe of left foot Yes           Plan:  - Pt seen and evaluated   Slantback on the left hallux.   - Moisturize daily.  - Start doxycycline.   - See again in 3 months.

## 2024-10-30 DIAGNOSIS — T14.8XXA LOCAL INFECTION OF WOUND: Primary | ICD-10-CM

## 2024-10-30 DIAGNOSIS — L08.9 LOCAL INFECTION OF WOUND: Primary | ICD-10-CM

## 2024-10-30 LAB
BACTERIA WND CULT: ABNORMAL
GRAM STAIN RESULT: ABNORMAL
GRAM STAIN RESULT: ABNORMAL

## 2024-10-30 RX ORDER — CEPHALEXIN 500 MG/1
500 CAPSULE ORAL 3 TIMES DAILY
Qty: 30 CAPSULE | Refills: 0 | Status: SHIPPED | OUTPATIENT
Start: 2024-10-30

## 2024-11-01 ENCOUNTER — LAB (OUTPATIENT)
Dept: LAB | Facility: CLINIC | Age: 59
End: 2024-11-01
Payer: COMMERCIAL

## 2024-11-01 ENCOUNTER — TELEPHONE (OUTPATIENT)
Dept: TRANSPLANT | Facility: CLINIC | Age: 59
End: 2024-11-01

## 2024-11-01 DIAGNOSIS — N18.32 STAGE 3B CHRONIC KIDNEY DISEASE (H): ICD-10-CM

## 2024-11-01 DIAGNOSIS — D63.1 ANEMIA IN STAGE 3B CHRONIC KIDNEY DISEASE (H): ICD-10-CM

## 2024-11-01 DIAGNOSIS — Z94.4 LIVER REPLACED BY TRANSPLANT (H): ICD-10-CM

## 2024-11-01 DIAGNOSIS — E55.9 VITAMIN D DEFICIENCY: ICD-10-CM

## 2024-11-01 DIAGNOSIS — N18.32 ANEMIA IN STAGE 3B CHRONIC KIDNEY DISEASE (H): ICD-10-CM

## 2024-11-01 LAB
ALBUMIN SERPL BCG-MCNC: 4.1 G/DL (ref 3.5–5.2)
ALP SERPL-CCNC: 176 U/L (ref 40–150)
ALT SERPL W P-5'-P-CCNC: 16 U/L (ref 0–70)
ANION GAP SERPL CALCULATED.3IONS-SCNC: 8 MMOL/L (ref 7–15)
AST SERPL W P-5'-P-CCNC: 22 U/L (ref 0–45)
BILIRUB DIRECT SERPL-MCNC: <0.2 MG/DL (ref 0–0.3)
BILIRUB SERPL-MCNC: 0.5 MG/DL
BUN SERPL-MCNC: 32.9 MG/DL (ref 8–23)
CALCIUM SERPL-MCNC: 9.1 MG/DL (ref 8.8–10.4)
CHLORIDE SERPL-SCNC: 107 MMOL/L (ref 98–107)
CREAT SERPL-MCNC: 1.94 MG/DL (ref 0.67–1.17)
EGFRCR SERPLBLD CKD-EPI 2021: 39 ML/MIN/1.73M2
ERYTHROCYTE [DISTWIDTH] IN BLOOD BY AUTOMATED COUNT: 13.2 % (ref 10–15)
FERRITIN SERPL-MCNC: 604 NG/ML (ref 31–409)
GLUCOSE SERPL-MCNC: 104 MG/DL (ref 70–99)
HCO3 SERPL-SCNC: 22 MMOL/L (ref 22–29)
HCT VFR BLD AUTO: 32.6 % (ref 40–53)
HGB BLD-MCNC: 10.7 G/DL (ref 13.3–17.7)
IRON BINDING CAPACITY (ROCHE): 214 UG/DL (ref 240–430)
IRON SATN MFR SERPL: 37 % (ref 15–46)
IRON SERPL-MCNC: 80 UG/DL (ref 61–157)
MCH RBC QN AUTO: 30.1 PG (ref 26.5–33)
MCHC RBC AUTO-ENTMCNC: 32.8 G/DL (ref 31.5–36.5)
MCV RBC AUTO: 92 FL (ref 78–100)
PHOSPHATE SERPL-MCNC: 3.9 MG/DL (ref 2.5–4.5)
PLATELET # BLD AUTO: 116 10E3/UL (ref 150–450)
POTASSIUM SERPL-SCNC: 5.1 MMOL/L (ref 3.4–5.3)
PROT SERPL-MCNC: 6.7 G/DL (ref 6.4–8.3)
PTH-INTACT SERPL-MCNC: 73 PG/ML (ref 15–65)
RBC # BLD AUTO: 3.56 10E6/UL (ref 4.4–5.9)
SODIUM SERPL-SCNC: 137 MMOL/L (ref 135–145)
TACROLIMUS BLD-MCNC: 11.3 UG/L (ref 5–15)
TME LAST DOSE: NORMAL H
TME LAST DOSE: NORMAL H
WBC # BLD AUTO: 4.9 10E3/UL (ref 4–11)

## 2024-11-01 PROCEDURE — 80197 ASSAY OF TACROLIMUS: CPT | Performed by: INTERNAL MEDICINE

## 2024-11-01 PROCEDURE — 84100 ASSAY OF PHOSPHORUS: CPT | Performed by: PATHOLOGY

## 2024-11-01 PROCEDURE — 80053 COMPREHEN METABOLIC PANEL: CPT | Performed by: PATHOLOGY

## 2024-11-01 PROCEDURE — 99000 SPECIMEN HANDLING OFFICE-LAB: CPT | Performed by: PATHOLOGY

## 2024-11-01 PROCEDURE — 36415 COLL VENOUS BLD VENIPUNCTURE: CPT | Performed by: PATHOLOGY

## 2024-11-01 PROCEDURE — 83540 ASSAY OF IRON: CPT | Performed by: PATHOLOGY

## 2024-11-01 PROCEDURE — 83970 ASSAY OF PARATHORMONE: CPT | Performed by: PATHOLOGY

## 2024-11-01 PROCEDURE — 82248 BILIRUBIN DIRECT: CPT | Performed by: PATHOLOGY

## 2024-11-01 PROCEDURE — 83550 IRON BINDING TEST: CPT | Performed by: PATHOLOGY

## 2024-11-01 PROCEDURE — 82728 ASSAY OF FERRITIN: CPT | Performed by: PATHOLOGY

## 2024-11-01 PROCEDURE — 85027 COMPLETE CBC AUTOMATED: CPT | Performed by: PATHOLOGY

## 2024-11-01 NOTE — TELEPHONE ENCOUNTER
ISSUE:   Tacrolimus IR level 11.3 on 11/1, goal 3-5, dose 4 mg in the am and 3 mg in the pm.    PLAN:   Call Patient and confirm this was an accurate 12-hour trough.   Verify Tacrolimus IR dose 4 mg in the am and 3 mg in the pm.   Confirm no new medications or or missed doses.   Confirm no new illness / infection / diarrhea.   If accurate trough and accurate dose, decrease Tacrolimus IR dose to 3 mg in the am and 2 mg in the pm     Is this more than a 50% increase or decrease in current IS dose: No  If YES, justification: NA    Repeat labs in 1 weeks.  *If > 50% change in immunosuppression dose, repeat labs in 1 week.     Jenna Harrison RN on 11/1/2024 at 1:22 PM

## 2024-11-04 ENCOUNTER — LAB (OUTPATIENT)
Dept: LAB | Facility: CLINIC | Age: 59
End: 2024-11-04
Attending: INTERNAL MEDICINE
Payer: COMMERCIAL

## 2024-11-04 ENCOUNTER — OFFICE VISIT (OUTPATIENT)
Dept: GASTROENTEROLOGY | Facility: CLINIC | Age: 59
End: 2024-11-04
Attending: INTERNAL MEDICINE
Payer: COMMERCIAL

## 2024-11-04 VITALS
OXYGEN SATURATION: 99 % | DIASTOLIC BLOOD PRESSURE: 97 MMHG | HEART RATE: 70 BPM | TEMPERATURE: 98 F | RESPIRATION RATE: 18 BRPM | SYSTOLIC BLOOD PRESSURE: 171 MMHG

## 2024-11-04 DIAGNOSIS — I15.1 HYPERTENSION SECONDARY TO OTHER RENAL DISORDERS: ICD-10-CM

## 2024-11-04 DIAGNOSIS — Z94.4 LIVER TRANSPLANT RECIPIENT (H): Primary | ICD-10-CM

## 2024-11-04 DIAGNOSIS — Z94.4 LIVER REPLACED BY TRANSPLANT (H): ICD-10-CM

## 2024-11-04 LAB
TACROLIMUS BLD-MCNC: 5.5 UG/L (ref 5–15)
TME LAST DOSE: NORMAL H
TME LAST DOSE: NORMAL H

## 2024-11-04 PROCEDURE — 99213 OFFICE O/P EST LOW 20 MIN: CPT | Performed by: INTERNAL MEDICINE

## 2024-11-04 PROCEDURE — 99000 SPECIMEN HANDLING OFFICE-LAB: CPT | Performed by: PATHOLOGY

## 2024-11-04 PROCEDURE — 99214 OFFICE O/P EST MOD 30 MIN: CPT | Performed by: INTERNAL MEDICINE

## 2024-11-04 PROCEDURE — 80197 ASSAY OF TACROLIMUS: CPT | Performed by: INTERNAL MEDICINE

## 2024-11-04 PROCEDURE — 36415 COLL VENOUS BLD VENIPUNCTURE: CPT | Performed by: PATHOLOGY

## 2024-11-04 RX ORDER — AMLODIPINE BESYLATE 10 MG/1
10 TABLET ORAL DAILY
Qty: 90 TABLET | Refills: 3 | Status: SHIPPED | OUTPATIENT
Start: 2024-11-04

## 2024-11-04 ASSESSMENT — PAIN SCALES - GENERAL: PAINLEVEL_OUTOF10: SEVERE PAIN (7)

## 2024-11-04 NOTE — LETTER
11/4/2024      Camacho Bhagat  [address] [facility] [place] [address]      Dear Colleague,    Thank you for referring your patient, Camacho Bhagat, to the [facility] [place]. Please see a copy of my visit note below.    Solid Organ Transplant Hepatology Follow-Up Visit:     HISTORY OF PRESENT ILLNESS:   I had the pleasure of seeing Camacho Bhagat for followup in the Liver Clinic at the [facility] on November 4, 2024. Mr. Bhagat returns now 7 and 1/2 years status post liver transplantation.     He is doing well for the most part.  He denies abdominal pain, itching, skin rash, or fatigue.  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  He denies increased abdominal girth or lower extremity edema.       He denies fevers or chills, cough or shortness of breath.  He denies any nausea, vomiting.  He does have chronic diarrhea.  Vaughn did have a partial colectomy around the time of transplant with resection of the ileocecal valve and I suspect this is some of the reason why he has these symptoms.       Otherwise, his appetite is good and his weight for the most part has remained stable.     He did have squamous cell cancer of the skin removed from his back recently.    Medications:   Current Outpatient Medications   Medication Sig Dispense Refill     alcohol swab prep pads Use to swab area of injection/francisca as directed. 100 each 3     alpha-lipoic acid 600 MG capsule Take 600 mg by mouth       amLODIPine (NORVASC) 10 MG tablet Take 1 tablet (10 mg) by mouth daily 90 tablet 3     augmented betamethasone dipropionate (DIPROLENE-AF) 0.05 % external ointment Apply twice daily as needed for rash on the legs 45 g 11     cephALEXin (KEFLEX) 500 MG capsule Take 1 capsule (500 mg) by mouth 3 times daily. 30 capsule 0     cholecalciferol (VITAMIN D3) 1000 UNIT tablet Take 1 tablet (1,000 Units) by mouth daily 100 tablet 3     Continuous Glucose Sensor (DEXCOM G6  SENSOR) MISC Change every 10 days. 9 each 3     Continuous Glucose Transmitter (DEXCOM G6 TRANSMITTER) MISC Change every 3 months. 1 each 3     cyclobenzaprine (FLEXERIL) 10 MG tablet Take 1 tablet (10 mg) by mouth 3 times daily as needed for muscle spasms 90 tablet 3     doxazosin (CARDURA) 8 MG tablet Take 1 tablet (8 mg) by mouth at bedtime Take a total of 10 mg at bedtime 90 tablet 3     empagliflozin (JARDIANCE) 10 MG TABS tablet Take 1 tablet (10 mg) by mouth daily. 90 tablet 1     fluticasone (FLONASE) 50 MCG/ACT nasal spray Spray 1 spray into both nostrils daily 20 mL 3     furosemide (LASIX) 40 MG tablet Take 1 tablet (40 mg) by mouth daily . 90 tablet 3     Insulin Disposable Pump (OMNIPOD 5 G6 INTRO, GEN 5,) KIT 1 kit daily 1 kit 0     Insulin Disposable Pump (OMNIPOD 5 G6 POD, GEN 5,) MISC 1 each See Admin Instructions Use per 's instructions. 1 each 1     Insulin Disposable Pump (OMNIPOD 5 PODS, GEN 5,) MISC 1 each See Admin Instructions. Change every 2 days. Use for insulin administration. 45 each 3     Insulin Lispro (HUMALOG KWIKPEN) 200 UNIT/ML soln To be used in the insulin pump. Total daily dose up to 170 U. 72 mL 3     insulin pen needle (BD ENE U/F) 32G X 4 MM miscellaneous Use 1 pen needle 4 times daily or as directed. 400 each 1     lidocaine (LIDODERM) 5 % patch Place 1 patch onto the skin every 24 hours To prevent lidocaine toxicity, patient should be patch free for 12 hrs daily. 30 patch 11     lidocaine (XYLOCAINE) 5 % external ointment Apply topically as needed for moderate pain 90 g 11     losartan (COZAAR) 25 MG tablet Take 1 tablet (25 mg) by mouth daily 90 tablet 3     methocarbamol (ROBAXIN) 500 MG tablet Take 1 tablet (500 mg) by mouth 2 times daily as needed for muscle spasms 60 tablet 0     metoprolol succinate ER (TOPROL XL) 200 MG 24 hr tablet Take 1 tablet (200 mg) by mouth daily 90 tablet 3     mycophenolic acid (GENERIC EQUIVALENT) 360 MG EC tablet Take 2 tablets  (720 mg) by mouth every 12 hours 360 tablet 3     oxyCODONE (ROXICODONE) 5 MG tablet Take 1 tablet (5 mg) by mouth 4 times daily as needed for pain maximum 6 tablet(s) per day 30 tablet 0     predniSONE (DELTASONE) 2.5 MG tablet Take 1 tablet (2.5 mg) by mouth daily 90 tablet 3     sildenafil (REVATIO) 20 MG tablet Take 2 tablets (40 mg) by mouth daily as needed (erectile dysfunction) 10 tablet 11     tacrolimus (GENERIC EQUIVALENT) 1 MG capsule Take 4 capsules (4 mg) by mouth every morning AND 3 capsules (3 mg) every evening. 630 capsule 3     tirzepatide (MOUNJARO) 7.5 MG/0.5ML pen Inject 7.5 mg subcutaneously once a week. 3 mL 0     tretinoin (RETIN-A) 0.025 % external cream Apply a pea-sized amount evenly over the face at nighttime before bed 45 g 11     triamcinolone (KENALOG) 0.1 % external ointment Apply topically 2 times daily to the itching on the legs 80 g 1     ursodiol (ACTIGALL) 500 MG tablet Take 1 tablet (500 mg) by mouth 2 times daily 180 tablet 3     doxycycline hyclate (VIBRAMYCIN) 100 MG capsule Take 1 capsule (100 mg) by mouth 2 times daily. (Patient not taking: Reported on 11/4/2024) 10 capsule 0     gabapentin (NEURONTIN) 800 MG tablet Take 1 tablet (800 mg) by mouth 3 times daily 90 tablet 11     hydrOXYzine (ATARAX) 25 MG tablet Take 1 tablet (25 mg) by mouth 3 times daily as needed for itching 90 tablet 3     tirzepatide (MOUNJARO) 2.5 MG/0.5ML pen Inject 2.5 mg Subcutaneous every 7 days (Patient not taking: Reported on 11/4/2024) 2 mL 1     tirzepatide (MOUNJARO) 5 MG/0.5ML pen Inject 5 mg subcutaneously every 7 days. (Patient not taking: Reported on 11/4/2024) 2 mL 2     Current Facility-Administered Medications   Medication Dose Route Frequency Provider Last Rate Last Admin     bevacizumab (AVASTIN) intravitreal inj 1.25 mg  1.25 mg Intravitreal Q28 Days Taye Giordano MD         bevacizumab (AVASTIN) intravitreal inj 1.25 mg  1.25 mg Intravitreal Q28 Days Taye Giordano MD          bevacizumab (AVASTIN) intravitreal inj 1.25 mg  1.25 mg Intravitreal Q28 Days Stan Roque MD         bevacizumab (AVASTIN) intravitreal inj 1.25 mg  1.25 mg Intravitreal Q28 Days Stan Roque MD          Vitals:   BP (!) 171/97 (BP Location: Right arm, Patient Position: Sitting, Cuff Size: Adult Large)   Pulse 70   Temp 98  F (36.7  C) (Oral)   Resp 18   SpO2 99%     Physical Exam:   In general he looks quite well. HEENT exam shows no scleral icterus or temporal muscle wasting. Chest is clear. Abdominal exam shows no increase in girth. No masses or tenderness to palpation are present. Liver is 10 cm in span without left lobe enlargement. No spleen tip is palpable.  His incision is intact.  The area where he had his previous skin cancer removed shows resolving infection.  Extremity exam shows no edema. Skin exam shows no other suspicious lesions and neurologic exam is nonfocal.     Labs:   Lab Results   Component Value Date     11/01/2024    POTASSIUM 5.1 11/01/2024    CHLORIDE 107 11/01/2024    ANIONGAP 8 11/01/2024    CO2 22 11/01/2024    BUN 32.9 (H) 11/01/2024    CR 1.94 (H) 11/01/2024    GFRESTIMATED 39 (L) 11/01/2024    JACOB 9.1 11/01/2024      Lab Results   Component Value Date    WBC 4.9 11/01/2024    HGB 10.7 (L) 11/01/2024    HCT 32.6 (L) 11/01/2024    MCV 92 11/01/2024    MCH 30.1 11/01/2024    MCHC 32.8 11/01/2024    RDW 13.2 11/01/2024     (L) 11/01/2024     Lab Results   Component Value Date    ALBUMIN 4.1 11/01/2024    ALKPHOS 176 (H) 11/01/2024    AST 22 11/01/2024    BILICONJ 0.0 06/28/2012     Lab Results   Component Value Date    INR 0.89 06/01/2018       Recent Labs   Lab Test 11/01/24  0723 09/16/24  1657 07/08/24  1647 05/23/24  0721 03/01/24  1712 01/09/24  1714 11/30/23  1648 09/25/23  1029 07/31/23  1704 04/19/23  1238   ALKPHOS 176* 177* 155* 155* 144 162* 164* 153* 173* 167*   ALT 16 18 20 22 23 21 23 24 27 18   AST 22 20 27 21 24 26 25 28 32  22        Imaging:   No images are attached to the encounter.     Assessment/Plan:   IMPRESSION:   My impression is Mr. Bhagat is doing well now 7 and 1/2 years status post liver transplantation.  His liver tests are quite good. He does have chronic kidney disease as well, and similarly, that remains stable with a GFR of 40.  I am concerned about his blood pressure.  He reports he had run out of amlodipine and I have refilled that.  I have asked him to recheck his blood pressure for the next 1 to 2 weeks and report back to Jovanna Avila     He also has diabetes and his A1c is excellent.  He is up to date with regard to vaccines.  For other cancer screening, he did see the dermatologist and had his first squamous cell cancer.  He is up to date with regard to screening colonoscopy as well.  His last bone density study was essentially normal.        My plan will be to see him back in the clinic again in 6 months.     I did spend a total of 30 minutes (on the date of the encounter), including 20 minutes of face-to-face clinic time including counseling. The rest of the time was spent in documentation and review of records.        Thank you very much for allowing me to participate in the care of this patient.  If you have any questions regarding my recommendations, please do not hesitate to contact me.         Toñito Camejo MD      Professor of Medicine  [employer]      Executive Medical Director, Solid Organ Transplant Program  [facility]      Again, thank you for allowing me to participate in the care of your patient.        Sincerely,        Toñito Camejo MD

## 2024-11-04 NOTE — PROGRESS NOTES
Solid Organ Transplant Hepatology Follow-Up Visit:     HISTORY OF PRESENT ILLNESS:   I had the pleasure of seeing Camacho Bhagat for followup in the Liver Clinic at the [facility] on November 4, 2024. Mr. Bhagat returns now 7 and 1/2 years status post liver transplantation.     He is doing well for the most part.  He denies abdominal pain, itching, skin rash, or fatigue.  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  He denies increased abdominal girth or lower extremity edema.       He denies fevers or chills, cough or shortness of breath.  He denies any nausea, vomiting.  He does have chronic diarrhea.  Vaughn did have a partial colectomy around the time of transplant with resection of the ileocecal valve and I suspect this is some of the reason why he has these symptoms.       Otherwise, his appetite is good and his weight for the most part has remained stable.     He did have squamous cell cancer of the skin removed from his back recently.    Medications:   Current Outpatient Medications   Medication Sig Dispense Refill    alcohol swab prep pads Use to swab area of injection/francisca as directed. 100 each 3    alpha-lipoic acid 600 MG capsule Take 600 mg by mouth      amLODIPine (NORVASC) 10 MG tablet Take 1 tablet (10 mg) by mouth daily 90 tablet 3    augmented betamethasone dipropionate (DIPROLENE-AF) 0.05 % external ointment Apply twice daily as needed for rash on the legs 45 g 11    cephALEXin (KEFLEX) 500 MG capsule Take 1 capsule (500 mg) by mouth 3 times daily. 30 capsule 0    cholecalciferol (VITAMIN D3) 1000 UNIT tablet Take 1 tablet (1,000 Units) by mouth daily 100 tablet 3    Continuous Glucose Sensor (DEXCOM G6 SENSOR) MISC Change every 10 days. 9 each 3    Continuous Glucose Transmitter (DEXCOM G6 TRANSMITTER) MISC Change every 3 months. 1 each 3    cyclobenzaprine (FLEXERIL) 10 MG tablet Take 1 tablet (10 mg) by mouth 3 times daily as needed for muscle spasms 90  tablet 3    doxazosin (CARDURA) 8 MG tablet Take 1 tablet (8 mg) by mouth at bedtime Take a total of 10 mg at bedtime 90 tablet 3    empagliflozin (JARDIANCE) 10 MG TABS tablet Take 1 tablet (10 mg) by mouth daily. 90 tablet 1    fluticasone (FLONASE) 50 MCG/ACT nasal spray Spray 1 spray into both nostrils daily 20 mL 3    furosemide (LASIX) 40 MG tablet Take 1 tablet (40 mg) by mouth daily . 90 tablet 3    Insulin Disposable Pump (OMNIPOD 5 G6 INTRO, GEN 5,) KIT 1 kit daily 1 kit 0    Insulin Disposable Pump (OMNIPOD 5 G6 POD, GEN 5,) MISC 1 each See Admin Instructions Use per 's instructions. 1 each 1    Insulin Disposable Pump (OMNIPOD 5 PODS, GEN 5,) MISC 1 each See Admin Instructions. Change every 2 days. Use for insulin administration. 45 each 3    Insulin Lispro (HUMALOG KWIKPEN) 200 UNIT/ML soln To be used in the insulin pump. Total daily dose up to 170 U. 72 mL 3    insulin pen needle (BD ENE U/F) 32G X 4 MM miscellaneous Use 1 pen needle 4 times daily or as directed. 400 each 1    lidocaine (LIDODERM) 5 % patch Place 1 patch onto the skin every 24 hours To prevent lidocaine toxicity, patient should be patch free for 12 hrs daily. 30 patch 11    lidocaine (XYLOCAINE) 5 % external ointment Apply topically as needed for moderate pain 90 g 11    losartan (COZAAR) 25 MG tablet Take 1 tablet (25 mg) by mouth daily 90 tablet 3    methocarbamol (ROBAXIN) 500 MG tablet Take 1 tablet (500 mg) by mouth 2 times daily as needed for muscle spasms 60 tablet 0    metoprolol succinate ER (TOPROL XL) 200 MG 24 hr tablet Take 1 tablet (200 mg) by mouth daily 90 tablet 3    mycophenolic acid (GENERIC EQUIVALENT) 360 MG EC tablet Take 2 tablets (720 mg) by mouth every 12 hours 360 tablet 3    oxyCODONE (ROXICODONE) 5 MG tablet Take 1 tablet (5 mg) by mouth 4 times daily as needed for pain maximum 6 tablet(s) per day 30 tablet 0    predniSONE (DELTASONE) 2.5 MG tablet Take 1 tablet (2.5 mg) by mouth daily 90 tablet  3    sildenafil (REVATIO) 20 MG tablet Take 2 tablets (40 mg) by mouth daily as needed (erectile dysfunction) 10 tablet 11    tacrolimus (GENERIC EQUIVALENT) 1 MG capsule Take 4 capsules (4 mg) by mouth every morning AND 3 capsules (3 mg) every evening. 630 capsule 3    tirzepatide (MOUNJARO) 7.5 MG/0.5ML pen Inject 7.5 mg subcutaneously once a week. 3 mL 0    tretinoin (RETIN-A) 0.025 % external cream Apply a pea-sized amount evenly over the face at nighttime before bed 45 g 11    triamcinolone (KENALOG) 0.1 % external ointment Apply topically 2 times daily to the itching on the legs 80 g 1    ursodiol (ACTIGALL) 500 MG tablet Take 1 tablet (500 mg) by mouth 2 times daily 180 tablet 3    doxycycline hyclate (VIBRAMYCIN) 100 MG capsule Take 1 capsule (100 mg) by mouth 2 times daily. (Patient not taking: Reported on 11/4/2024) 10 capsule 0    gabapentin (NEURONTIN) 800 MG tablet Take 1 tablet (800 mg) by mouth 3 times daily 90 tablet 11    hydrOXYzine (ATARAX) 25 MG tablet Take 1 tablet (25 mg) by mouth 3 times daily as needed for itching 90 tablet 3    tirzepatide (MOUNJARO) 2.5 MG/0.5ML pen Inject 2.5 mg Subcutaneous every 7 days (Patient not taking: Reported on 11/4/2024) 2 mL 1    tirzepatide (MOUNJARO) 5 MG/0.5ML pen Inject 5 mg subcutaneously every 7 days. (Patient not taking: Reported on 11/4/2024) 2 mL 2     Current Facility-Administered Medications   Medication Dose Route Frequency Provider Last Rate Last Admin    bevacizumab (AVASTIN) intravitreal inj 1.25 mg  1.25 mg Intravitreal Q28 Days Taye Giordano MD        bevacizumab (AVASTIN) intravitreal inj 1.25 mg  1.25 mg Intravitreal Q28 Days Taye Giordano MD        bevacizumab (AVASTIN) intravitreal inj 1.25 mg  1.25 mg Intravitreal Q28 Days Stan Roque MD        bevacizumab (AVASTIN) intravitreal inj 1.25 mg  1.25 mg Intravitreal Q28 Days Stan Roque MD          Vitals:   BP (!) 171/97 (BP Location: Right arm, Patient  Position: Sitting, Cuff Size: Adult Large)   Pulse 70   Temp 98  F (36.7  C) (Oral)   Resp 18   SpO2 99%     Physical Exam:   In general he looks quite well. HEENT exam shows no scleral icterus or temporal muscle wasting. Chest is clear. Abdominal exam shows no increase in girth. No masses or tenderness to palpation are present. Liver is 10 cm in span without left lobe enlargement. No spleen tip is palpable.  His incision is intact.  The area where he had his previous skin cancer removed shows resolving infection.  Extremity exam shows no edema. Skin exam shows no other suspicious lesions and neurologic exam is nonfocal.     Labs:   Lab Results   Component Value Date     11/01/2024    POTASSIUM 5.1 11/01/2024    CHLORIDE 107 11/01/2024    ANIONGAP 8 11/01/2024    CO2 22 11/01/2024    BUN 32.9 (H) 11/01/2024    CR 1.94 (H) 11/01/2024    GFRESTIMATED 39 (L) 11/01/2024    JACOB 9.1 11/01/2024      Lab Results   Component Value Date    WBC 4.9 11/01/2024    HGB 10.7 (L) 11/01/2024    HCT 32.6 (L) 11/01/2024    MCV 92 11/01/2024    MCH 30.1 11/01/2024    MCHC 32.8 11/01/2024    RDW 13.2 11/01/2024     (L) 11/01/2024     Lab Results   Component Value Date    ALBUMIN 4.1 11/01/2024    ALKPHOS 176 (H) 11/01/2024    AST 22 11/01/2024    BILICONJ 0.0 06/28/2012     Lab Results   Component Value Date    INR 0.89 06/01/2018       Recent Labs   Lab Test 11/01/24  0723 09/16/24  1657 07/08/24  1647 05/23/24  0721 03/01/24  1712 01/09/24  1714 11/30/23  1648 09/25/23  1029 07/31/23  1704 04/19/23  1238   ALKPHOS 176* 177* 155* 155* 144 162* 164* 153* 173* 167*   ALT 16 18 20 22 23 21 23 24 27 18   AST 22 20 27 21 24 26 25 28 32 22        Imaging:   No images are attached to the encounter.     Assessment/Plan:   IMPRESSION:   My impression is Mr. Bhagat is doing well now 7 and 1/2 years status post liver transplantation.  His liver tests are quite good. He does have chronic kidney disease as well, and similarly, that  remains stable with a GFR of 40.  I am concerned about his blood pressure.  He reports he had run out of amlodipine and I have refilled that.  I have asked him to recheck his blood pressure for the next 1 to 2 weeks and report back to Jovanna Avila     He also has diabetes and his A1c is excellent.  He is up to date with regard to vaccines.  For other cancer screening, he did see the dermatologist and had his first squamous cell cancer.  He is up to date with regard to screening colonoscopy as well.  His last bone density study was essentially normal.        My plan will be to see him back in the clinic again in 6 months.     I did spend a total of 30 minutes (on the date of the encounter), including 20 minutes of face-to-face clinic time including counseling. The rest of the time was spent in documentation and review of records.    Thank you very much for allowing me to participate in the care of this patient.  If you have any questions regarding my recommendations, please do not hesitate to contact me.         Toñito Camejo MD      Professor of Medicine  [employer]      Executive Medical Director, Solid Organ Transplant Program  [facility]

## 2024-11-04 NOTE — PROGRESS NOTES
HPI:    Camacho Bhagat is a 59-year-old man with multiple comorbidities including liver transplantation who was seen today for management of hypertension and cardiac evaluation. He is on Norvasc 10 g daily, Metoprolol 200 mg daily, Doxazosin 8 mg daily, and Losartan 25 mg daily. His Losartan was unable to be increased due to borderline high K levels. His blood pressure has been suboptimally controlled at times but better at other times. His blood pressure today was 146/84 mmHg. He denies any symptoms related to hypertension. He denies chest pain, dyspnea at rest, orthopnea, PND, pedal edema, but notes exertional dyspnea after significant exertion.    PAST MEDICAL HISTORY:  Past Medical History:   Diagnosis Date    Cancer (H) 12/15    Stage 4 liver cancer    Diabetes type 2, controlled (H) 11/10/2016    Esophageal reflux     Fibromyalgia 01/2009    dx with Dr Benitez( Rheum)    Gangrene of finger (H) 08/25/2017    H/O deep venous thrombosis 11/2001    Permanent IVC filter in place.    H/O CASTAÑEDA (nonalcoholic steatohepatitis)     H/O Pneumonia, organism unspecified(486) 10/2001    Included ARDS, sepsis, and  acute renal failure; hospitalized, required tracheostomy placement.    H/O: HTN (hypertension) 11/2001    No longer prescribed antihypertensive medication.      History of hepatocellular carcinoma     History of liver transplant (H)     History of obstructive sleep apnea     No longer wears CPAP since losing approximately 200 pounds with his liver transplant and its complications.      HLD (hyperlipidemia)     Hypertension     Ischemia of both lower extremities 08/25/2017    Distal ischemia due to shock/high pressor requirements    Liver transplant rejection (H) 06/11/2018    Neutropenic colitis (H) 07/04/2017    Osteoarthritis     Presence of PERMANENT IVC filter     Rheumatoid arthritis(714.0)     remission for many years    Squamous cell skin cancer     on back       CURRENT MEDICATIONS:  Current Outpatient  Medications   Medication Sig Dispense Refill    alcohol swab prep pads Use to swab area of injection/francisca as directed. 100 each 3    alpha-lipoic acid 600 MG capsule Take 600 mg by mouth      amLODIPine (NORVASC) 10 MG tablet Take 1 tablet (10 mg) by mouth daily 90 tablet 3    augmented betamethasone dipropionate (DIPROLENE-AF) 0.05 % external ointment Apply twice daily as needed for rash on the legs 45 g 11    cholecalciferol (VITAMIN D3) 1000 UNIT tablet Take 1 tablet (1,000 Units) by mouth daily (Patient taking differently: Take 1,000 Units by mouth every morning.) 100 tablet 3    cyclobenzaprine (FLEXERIL) 10 MG tablet Take 1 tablet (10 mg) by mouth 3 times daily as needed for muscle spasms 90 tablet 3    doxazosin (CARDURA) 8 MG tablet Take 1 tablet (8 mg) by mouth at bedtime Take a total of 10 mg at bedtime 90 tablet 3    empagliflozin (JARDIANCE) 10 MG TABS tablet Take 1 tablet (10 mg) by mouth daily. 90 tablet 1    fluticasone (FLONASE) 50 MCG/ACT nasal spray Spray 1 spray into both nostrils daily 20 mL 3    furosemide (LASIX) 40 MG tablet Take 1 tablet (40 mg) by mouth daily . 90 tablet 3    gabapentin (NEURONTIN) 800 MG tablet Take 1 tablet (800 mg) by mouth 3 times daily 90 tablet 11    hydrOXYzine (ATARAX) 25 MG tablet Take 1 tablet (25 mg) by mouth 3 times daily as needed for itching 90 tablet 3    Insulin Disposable Pump (OMNIPOD 5 G6 INTRO, GEN 5,) KIT 1 kit daily 1 kit 0    Insulin Disposable Pump (OMNIPOD 5 G6 POD, GEN 5,) MISC 1 each See Admin Instructions Use per 's instructions. 1 each 1    insulin pen needle (BD ENE U/F) 32G X 4 MM miscellaneous Use 1 pen needle 4 times daily or as directed. 400 each 1    lidocaine (LIDODERM) 5 % patch Place 1 patch onto the skin every 24 hours To prevent lidocaine toxicity, patient should be patch free for 12 hrs daily. 30 patch 11    lidocaine (XYLOCAINE) 5 % external ointment Apply topically as needed for moderate pain 90 g 11    losartan  (COZAAR) 25 MG tablet Take 1 tablet (25 mg) by mouth daily 90 tablet 3    methocarbamol (ROBAXIN) 500 MG tablet Take 1 tablet (500 mg) by mouth 2 times daily as needed for muscle spasms 60 tablet 0    metoprolol succinate ER (TOPROL XL) 200 MG 24 hr tablet Take 1 tablet (200 mg) by mouth daily 90 tablet 3    mycophenolic acid (GENERIC EQUIVALENT) 360 MG EC tablet Take 2 tablets (720 mg) by mouth every 12 hours 360 tablet 3    oxyCODONE (ROXICODONE) 5 MG tablet Take 1 tablet (5 mg) by mouth 4 times daily as needed for pain maximum 6 tablet(s) per day 30 tablet 0    predniSONE (DELTASONE) 2.5 MG tablet Take 1 tablet (2.5 mg) by mouth daily 90 tablet 3    sildenafil (REVATIO) 20 MG tablet Take 2 tablets (40 mg) by mouth daily as needed (erectile dysfunction) 10 tablet 11    tacrolimus (GENERIC EQUIVALENT) 1 MG capsule Take 4 capsules (4 mg) by mouth every morning AND 3 capsules (3 mg) every evening. 630 capsule 3    tirzepatide (MOUNJARO) 2.5 MG/0.5ML pen Inject 2.5 mg Subcutaneous every 7 days 2 mL 1    tirzepatide (MOUNJARO) 5 MG/0.5ML pen Inject 5 mg subcutaneously every 7 days. 2 mL 2    tretinoin (RETIN-A) 0.025 % external cream Apply a pea-sized amount evenly over the face at nighttime before bed 45 g 11    triamcinolone (KENALOG) 0.1 % external ointment Apply topically 2 times daily to the itching on the legs 80 g 1    ursodiol (ACTIGALL) 500 MG tablet Take 1 tablet (500 mg) by mouth 2 times daily 180 tablet 3    cephALEXin (KEFLEX) 500 MG capsule Take 1 capsule (500 mg) by mouth 3 times daily. 30 capsule 0    Continuous Glucose Sensor (DEXCOM G6 SENSOR) MISC Change every 10 days. 9 each 3    Continuous Glucose Transmitter (DEXCOM G6 TRANSMITTER) MISC Change every 3 months. 1 each 3    doxycycline hyclate (VIBRAMYCIN) 100 MG capsule Take 1 capsule (100 mg) by mouth 2 times daily. 10 capsule 0    Insulin Disposable Pump (OMNIPOD 5 PODS, GEN 5,) MISC 1 each See Admin Instructions. Change every 2 days. Use for  insulin administration. 45 each 3    Insulin Lispro (HUMALOG KWIKPEN) 200 UNIT/ML soln To be used in the insulin pump. Total daily dose up to 170 U. 72 mL 3    tirzepatide (MOUNJARO) 7.5 MG/0.5ML pen Inject 7.5 mg subcutaneously once a week. 3 mL 0       PAST SURGICAL HISTORY:  Past Surgical History:   Procedure Laterality Date    ARTHROSCOPY KNEE WITH MENISCAL REPAIR Right 2008    Right knee arthroscopy    BENCH LIVER N/A 03/04/2017    Procedure: BENCH LIVER;  Surgeon: Jovan Tran MD;  Location: [facility]    BIOPSY  5/2017    Liver    CHOLECYSTECTOMY      COLONOSCOPY N/A 07/21/2017    Procedure: COMBINED COLONOSCOPY, SINGLE OR MULTIPLE BIOPSY/POLYPECTOMY BY BIOPSY;  Colonoscopy;  Surgeon: Izaiah Montes MD;  Location: [facility]    COLONOSCOPY N/A 10/24/2022    Procedure: COLONOSCOPY, WITH POLYPECTOMY AND BIOPSY;  Surgeon: Abril Mcneill MD;  Location: [facility]    ENDOSCOPIC RETROGRADE CHOLANGIOPANCREATOGRAM N/A 05/22/2018    Procedure: COMBINED ENDOSCOPIC RETROGRADE CHOLANGIOPANCREATOGRAPHY, PLACE TUBE/STENT;  Endoscopic Retrograde Cholangiopancreatography with sphincterotomy and pancreatic duct stent placement;  Surgeon: Zay Gaitan MD;  Location: [facility]    ENT SURGERY      Repair of deviated septum    ESOPHAGOSCOPY, GASTROSCOPY, DUODENOSCOPY (EGD), COMBINED N/A 08/04/2016    Procedure: COMBINED ESOPHAGOSCOPY, GASTROSCOPY, DUODENOSCOPY (EGD), BIOPSY SINGLE OR MULTIPLE;  Surgeon: Trent Pederson MD;  Location:  GI    ESOPHAGOSCOPY, GASTROSCOPY, DUODENOSCOPY (EGD), COMBINED N/A 08/27/2017    Procedure: COMBINED ESOPHAGOSCOPY, GASTROSCOPY, DUODENOSCOPY (EGD);;  Surgeon: Los Wynn MD;  Location:  GI    ESOPHAGOSCOPY, GASTROSCOPY, DUODENOSCOPY (EGD), COMBINED N/A 08/17/2023    Procedure: Esophagoscopy, gastroscopy, duodenoscopy (EGD), combined;  Surgeon: Trent Villanueva MD;  Location:  GI    INCISION AND DRAINAGE ABDOMEN WASHOUT, COMBINED Right 02/14/2018    Procedure: COMBINED  INCISION AND DRAINAGE ABDOMEN WASHOUT;  COMBINED INCISION AND DRAINAGE right ABDOMEN flank;  Surgeon: Sara Dinh MD;  [facility]: [facility]    LAPAROTOMY EXPLORATORY N/A 2017    Procedure: LAPAROTOMY EXPLORATORY;  Exploratory Laparotomy, washout;  Surgeon: Tip Zhang MD;  [facility]    LAPAROTOMY EXPLORATORY N/A 2017    Procedure: LAPAROTOMY EXPLORATORY;  Exploratory Laparotomy, Washout with closure.;  Surgeon: Tip Zhang MD;  [facility]    LAPAROTOMY EXPLORATORY N/A 2017    Procedure: LAPAROTOMY EXPLORATORY;  Exploratory Laparotomy, Right angelique-colectomy, end ileostomy, mucosal fistula, partial omentectomy;  Surgeon: Sara Dinh MD;  Location: [facility]    LASIK      ORTHOPEDIC SURGERY Right     Repair of dislocated wrist.      PHACOEMULSIFICATION CLEAR CORNEA WITH STANDARD INTRAOCULAR LENS IMPLANT Right 2024    Procedure: RIGHT EYE PHACOEMULSIFICATION, COMPLEX CATARACT, WITH INTRAOCULAR LENS IMPLANT;  Surgeon: Stan Roque MD;  Location: [facility] OR    RELEASE TRIGGER FINGER Right 11/10/2017    Procedure: RELEASE TRIGGER FINGER;  Debride, V-Y Flap Right Index Finger;  Surgeon: Momo Noonan MD;  [facility]    TAKEDOWN ILEOSTOMY N/A 2018    Procedure: TAKEDOWN ILEOSTOMY;  Exploratory Laparotomy, Lysis of Adhesions, Takedown Of End Ileostomy, Takedown of mucocutaneous fistula, ileocolic resection  And Colorectal Anastomosis, Primary repair of Ventral Hernia, Anesthesia Block ;  Surgeon: Sara Dinh MD;  Location: [facility]    TRACHEOSTOMY  2001    During hospitalization for pneumonia.      TRANSPLANT LIVER RECIPIENT  DONOR N/A 2017    Procedure: TRANSPLANT LIVER RECIPIENT  DONOR;  Surgeon: Jovan Tran MD;  Location: [facility]       ALLERGIES     Allergies   Allergen Reactions    Erythromycin GI Disturbance    Losartan Other (See Comments)     Consistently develops hyperkalemia     Vioxx      Nausea, vomiting       FAMILY HISTORY:  Family History   Problem Relation Age of Onset    Arthritis Mother     Thyroid Cancer Mother         Survivor!    Thyroid Disease Mother         Thyroid cancer    Diabetes Father     Substance Abuse Father     Cervical Cancer Maternal Grandmother     Skin Cancer Maternal Grandfather     Cerebrovascular Disease Maternal Grandfather     No Known Problems Paternal Grandmother     Prostate Cancer Paternal Grandfather     Colon Cancer No family hx of     Hyperlipidemia No family hx of     Coronary Artery Disease No family hx of     Breast Cancer No family hx of     Glaucoma No family hx of     Hypertension No family hx of     Macular Degeneration No family hx of        SOCIAL HISTORY:  Social History     Socioeconomic History    Marital status:      Spouse name: None    Number of children: 1    Years of education: None    Highest education level: None   Occupational History    Occupation: [employer]    Tobacco Use    Smoking status: Former     Current packs/day: 0.00     Average packs/day: 0.5 packs/day for 1 year (0.5 ttl pk-yrs)     Types: Cigarettes, Cigars, Dip, chew, snus or snuff     Start date: 1987     Quit date: 1987     Years since quittin.3    Smokeless tobacco: Former     Types: Chew     Quit date: 10/8/2015    Tobacco comments:     1 Cigar once every other month.   Substance and Sexual Activity    Alcohol use: Not Currently     Comment: No alcohol since liver transplant.      Drug use: Never    Sexual activity: Yes     Partners: Female     Birth control/protection: None   Other Topics Concern    Parent/sibling w/ CABG, MI or angioplasty before 65F 55M? No   Social History Narrative    Former , then worked as [employer].         Social Drivers of Health     Financial Resource Strain: Low Risk  (2024)    Financial Resource Strain     Within the past 12 months, have you or your family members you live with been  unable to get utilities (heat, electricity) when it was really needed?: No   Food Insecurity: Low Risk  (8/8/2024)    Food Insecurity     Within the past 12 months, did you worry that your food would run out before you got money to buy more?: No     Within the past 12 months, did the food you bought just not last and you didn t have money to get more?: No   Transportation Needs: Low Risk  (8/8/2024)    Transportation Needs     Within the past 12 months, has lack of transportation kept you from medical appointments, getting your medicines, non-medical meetings or appointments, work, or from getting things that you need?: No   Interpersonal Safety: Low Risk  (1/24/2024)    Interpersonal Safety     Do you feel physically and emotionally safe where you currently live?: Yes     Within the past 12 months, have you been hit, slapped, kicked or otherwise physically hurt by someone?: No     Within the past 12 months, have you been humiliated or emotionally abused in other ways by your partner or ex-partner?: No   Housing Stability: Low Risk  (8/8/2024)    Housing Stability     Do you have housing? : Yes     Are you worried about losing your housing?: No       ROS:   Constitutional: No fever, chills, or sweats. No weight gain/loss   ENT: No visual disturbance, ear ache, epistaxis, sore throat  Allergies/Immunologic: Negative.   Respiratory: No cough, hemoptysia  Cardiovascular: As per HPI  GI: No nausea, vomiting, hematemesis, melena, or hematochezia  : No urinary frequency, dysuria, or hematuria  Integument: Negative  Psychiatric: Negative  Neuro: Negative  Endocrinology: Negative   Musculoskeletal: Negative    EXAM:  BP (!) 146/84 (BP Location: Right arm, Patient Position: Chair, Cuff Size: Adult Large)   Pulse 71   Wt 130.8 kg (288 lb 4.8 oz)   SpO2 99%   BMI 39.09 kg/m    In general, the patient is a pleasant male in no apparent distress.    HEENT: NC/AT.  PERRLA.  EOMI.  Sclerae white, not injected.  Nares clear.   Pharynx without erythema or exudate.  Dentition intact.    Neck: No adenopathy.  No thyromegaly. Carotids +4/4 bilaterally without bruits.  No jugular venous distension.   Heart: RRR. Normal S1, S2 splits physiologically. No murmur, rub, click, or gallop. The PMI is in the 5th ICS in the midclavicular line. There is no heave.    Lungs: CTA.  No ronchi, wheezes, rales.  No dullness to percussion.   Abdomen: Soft, nontender, nondistended. No organomegaly.  No bruits.   Extremities: No clubbing, cyanosis, or edema.  The pulses are +4/4 at the radial, brachial, femoral, popliteal, DP, and PT sites bilaterally.  No bruits are noted.  Neurologic: Alert and oriented to person/place/time, normal speech, gait and affect  Skin: No petechiae, purpura or rash.    Labs:  LIPID RESULTS:  Lab Results   Component Value Date    CHOL 155 01/09/2024    CHOL 134 11/24/2020    HDL 41 01/09/2024    HDL 39 (L) 11/24/2020    LDL 77 01/09/2024    LDL 35 11/24/2020    TRIG 185 (H) 01/09/2024    TRIG 298 (H) 11/24/2020    CHOLHDLRATIO 3.8 07/03/2012    NHDL 114 01/09/2024    NHDL 95 11/24/2020       LIVER ENZYME RESULTS:  Lab Results   Component Value Date    AST 22 11/01/2024    AST 19 05/27/2021    ALT 16 11/01/2024    ALT 32 05/27/2021       CBC RESULTS:  Lab Results   Component Value Date    WBC 4.9 11/01/2024    WBC 6.9 05/27/2021    RBC 3.56 (L) 11/01/2024    RBC 3.83 (L) 05/27/2021    HGB 10.7 (L) 11/01/2024    HGB 11.9 (L) 05/27/2021    HCT 32.6 (L) 11/01/2024    HCT 34.7 (L) 05/27/2021    MCV 92 11/01/2024    MCV 91 05/27/2021    MCH 30.1 11/01/2024    MCH 31.1 05/27/2021    MCHC 32.8 11/01/2024    MCHC 34.3 05/27/2021    RDW 13.2 11/01/2024    RDW 13.2 05/27/2021     (L) 11/01/2024     (L) 05/27/2021       BMP RESULTS:  Lab Results   Component Value Date     11/01/2024     06/08/2021    POTASSIUM 5.1 11/01/2024    POTASSIUM 5.3 01/30/2023    POTASSIUM 4.9 06/08/2021    CHLORIDE 107 11/01/2024    CHLORIDE  114 (H) 2023    CHLORIDE 105 2021    CO2 22 2024    CO2 22 2023    CO2 23 2021    ANIONGAP 8 2024    ANIONGAP 3 2023    ANIONGAP 6 2021     (H) 2024    GLC 86 2024    GLC 78 2023     (H) 2021    BUN 32.9 (H) 2024    BUN 48 (H) 2023    BUN 43 (H) 2021    CR 1.94 (H) 2024    CR 1.51 (H) 2021    GFRESTIMATED 39 (L) 2024    GFRESTIMATED 51 (L) 2021    GFRESTBLACK 59 (L) 2021    JACOB 9.1 2024    JACOB 8.9 2021        A1C RESULTS:  Lab Results   Component Value Date    A1C 6.2 (H) 2024    A1C 6.3 (H) 2021       INR RESULTS:  Lab Results   Component Value Date    INR 0.89 2018    INR 1.05 2018       Procedures:         [facility],[place]  Echocardiography Laboratory  [address] [place] [id number]     Name: RONNIE ARIZMENDI  MRN: [id number]  : 1965  Study Date: 2017 12:54 PM  Age: 51 yrs  Gender: Male  Patient Location: [facility]  Reason For Study: Liver Transplant  Ordering Physician: MINH PLASENCIA  Performed By: Prabhjot Kauffman RDCS     BSA: 2.3 m2  Height: 72 in  Weight: 235 lb  BP: 160/83 mmHg  _____________________________________________________________________________  __        Procedure  Limited Portable Echo Adult.  _____________________________________________________________________________  __        Interpretation Summary  Global and regional left ventricular function is normal with an EF of 60-65%.  Mild right ventricular dilation is present.  Global right ventricular function is normal.  Right ventricular systolic pressure is 36mmHg above the right atrial pressure.  No pericardial effusion is present.     This study was compared with the study from 2017, no significant changes  are found.        _____________________________________________________________________________  __        Left  Ventricle  Global and regional left ventricular function is normal with an EF of 60-65%.  No regional wall motion abnormalities are seen.     Right Ventricle  Global right ventricular function is normal. Mild right ventricular dilation  is present.     Atria  Moderate biatrial enlargement is present.        Mitral Valve  The mitral valve is normal. Trace mitral insufficiency is present.     Aortic Valve  Mild aortic valve sclerosis is present.     Tricuspid Valve  The tricuspid valve is normal. Mild tricuspid insufficiency is present. Right  ventricular systolic pressure is 36mmHg above the right atrial pressure.     Pulmonic Valve  The pulmonic valve cannot be assessed.     Vessels  The inferior vena cava cannot be assessed.     Pericardium  No pericardial effusion is present.        Compared to Previous Study  This study was compared with the study from 2017, RVSP is lower. .     Attestation  I have personally viewed the imaging and agree with the interpretation and  report as documented by the fellow, Jersey Fletcher, and/or edited by me.  _____________________________________________________________________________  __           Doppler Measurements & Calculations  TR max juliano: 299.0 cm/sec  TR max P.8 mmHg           _____________________________________________________________________________  __           Report approved by: Xochitl Cowan 2017 02:34 PM        Assessment and Plan:    Feliberto Bhagat is a 59-year-old man with multiple comorbidities including CKD and hypertension who has been maanged by nephrology for several years. His antihypertensive regimen was most recently adjusted with increase of his doxazosin to 8 mg daily. We will obtain more data with blood pressure readings over two weeks before making any changes since some of his blood pressure measurements have been normal. We will obtain an echocardiogram and BNP to evaluate for LVH and signs of heart failure with preserved ejection  fraction given his long-standing hypertension and exertional dyspnea.    Cory Loving MD  Cardiology      Addendum:    His BNP was normal at 224 ng/ml (normal 0-900). His echocardiogram showed normal LV function with eccentric LVH, mild LV dilation, and Grade I diastolic dysfunction. Overall, this was felt to be unchanged from his echocardiogram of 07/21/2017. Both these results are not worrisome for HFpEF.    Cory Loving MD  Cardiology

## 2024-11-04 NOTE — NURSING NOTE
"Chief Complaint   Patient presents with    RECHECK     post liver transplant 3/4/2017     Vital signs:  Temp: 98  F (36.7  C) Temp src: Oral BP: (!) 171/97 Pulse: 70   Resp: 18 SpO2: 99 %       Weight:  (declined, stated 285lb)  Estimated body mass index is 39.32 kg/m  as calculated from the following:    Height as of 7/31/24: 1.829 m (6' 0.01\").    Weight as of 9/30/24: 131.5 kg (290 lb).      Katie Harmon, [facility]  11/4/2024 3:22 PM    "

## 2024-11-06 ENCOUNTER — OFFICE VISIT (OUTPATIENT)
Dept: ENDOCRINOLOGY | Facility: CLINIC | Age: 59
End: 2024-11-06
Payer: COMMERCIAL

## 2024-11-06 ENCOUNTER — TELEPHONE (OUTPATIENT)
Dept: ENDOCRINOLOGY | Facility: CLINIC | Age: 59
End: 2024-11-06

## 2024-11-06 VITALS
DIASTOLIC BLOOD PRESSURE: 87 MMHG | OXYGEN SATURATION: 99 % | SYSTOLIC BLOOD PRESSURE: 135 MMHG | HEART RATE: 70 BPM | WEIGHT: 274.4 LBS | HEIGHT: 72 IN | BODY MASS INDEX: 37.17 KG/M2

## 2024-11-06 DIAGNOSIS — Z79.4 TYPE 2 DIABETES MELLITUS WITH DIABETIC POLYNEUROPATHY, WITH LONG-TERM CURRENT USE OF INSULIN (H): ICD-10-CM

## 2024-11-06 DIAGNOSIS — E66.01 CLASS 3 SEVERE OBESITY WITH SERIOUS COMORBIDITY AND BODY MASS INDEX (BMI) OF 40.0 TO 44.9 IN ADULT, UNSPECIFIED OBESITY TYPE (H): Primary | ICD-10-CM

## 2024-11-06 DIAGNOSIS — Z94.4 S/P LIVER TRANSPLANT (H): ICD-10-CM

## 2024-11-06 DIAGNOSIS — E66.813 CLASS 3 SEVERE OBESITY WITH SERIOUS COMORBIDITY AND BODY MASS INDEX (BMI) OF 40.0 TO 44.9 IN ADULT, UNSPECIFIED OBESITY TYPE (H): Primary | ICD-10-CM

## 2024-11-06 DIAGNOSIS — E11.42 TYPE 2 DIABETES MELLITUS WITH DIABETIC POLYNEUROPATHY, WITH LONG-TERM CURRENT USE OF INSULIN (H): ICD-10-CM

## 2024-11-06 PROCEDURE — G2211 COMPLEX E/M VISIT ADD ON: HCPCS | Performed by: NURSE PRACTITIONER

## 2024-11-06 PROCEDURE — 99213 OFFICE O/P EST LOW 20 MIN: CPT | Performed by: NURSE PRACTITIONER

## 2024-11-06 RX ORDER — TIRZEPATIDE 7.5 MG/.5ML
7.5 INJECTION, SOLUTION SUBCUTANEOUS DAILY
COMMUNITY
Start: 2024-10-01

## 2024-11-06 ASSESSMENT — PAIN SCALES - GENERAL: PAINLEVEL_OUTOF10: SEVERE PAIN (7)

## 2024-11-06 NOTE — LETTER
2024       RE: Camacho Bhagat  [address] [facility] [address]     Dear Colleague,    Thank you for referring your patient, Camacho Bhagat, to the [facility] [place] at [facility]. Please see a copy of my visit note below.      Return Medical Weight Management Note     Camacho Bhagat  MRN:  [id number]  :  1965  BISI:  2024    Dear Shay Kirkpatrick MD,    I had the pleasure of seeing your patient Camacho Bhagat. He is a 59 year old male who I am continuing to see for treatment of obesity related to:        2024     3:58 PM   --   I have the following health issues associated with obesity Type II Diabetes    High Blood Pressure    Sleep Apnea    Cancer    Osteoarthritis (joint disease)   I have the following symptoms associated with obesity Knee Pain    Lower Extremity Swelling    Back Pain    Fatigue    Hip Pain       Assessment & Plan  Problem List Items Addressed This Visit          Nervous and Auditory    Type 2 diabetes mellitus with diabetic polyneuropathy, with long-term current use of insulin (H)    Relevant Medications    MOUNJARO 7.5 MG/0.5ML SOAJ    Other Relevant Orders    Adult Bariatrics and Weight Management Clinic Follow-Up Order       Digestive    Class 3 severe obesity with serious comorbidity and body mass index (BMI) of 40.0 to 44.9 in adult (H) - Primary     Doing really well with mounjaro. Insulin needs have reduced. Blood sugars really well controlled. No increased frequency in hypoglycemia. No adverse side effects. Increased mounjaro 2 weeks ago to 7.5mg. will continue current plan.     Continue mounjaro 7.5mg   Keep up the great work   Follow up 3 months          Relevant Medications    MOUNJARO 7.5 MG/0.5ML SOAJ    Other Relevant Orders    Adult Bariatrics and Weight Management Clinic Follow-Up Order       Other    S/P liver transplant (H)          INTERVAL HISTORY:  New MW 2024  "BMI 40 7yr S/p liver transplant with DMII, CKD, HTN, RONNIE, OA, hx of DVT and fibromyalgia. DMII managed with insulin pump, cgm, and jardiance. Followed by hepatology and endocrinology.     Endocrinology increased mounjaro 10/2024 but he didn't get it started until 2 weeks ago.     Anti-obesity medication history    Current:   Mounjaro 7.5 mg (increased to 7.5 2 weeks ago per endocrinologist)   -no worsening nausea/ vomiting   - no indigestion  -no hypoglycemia   -no excess fatigue      Past/Failed/contraindicated:   Naltrexone- avoiding with hx liver transplant  Victoza- \"projectile vomiting'     Recent diet changes:   Prioritizes protein   3 meals a day   Appetite improving  Adequate hydration     Recent exercise/activity changes:   2 miles a day at work     Recent sleep changes: manageable     Vitamins/Labs: 11/2024    CURRENT WEIGHT:   274 lbs 6.4 oz    Initial Weight (lbs): 289.4 lbs     Cumulative weight loss (lbs): 15  Weight Loss Percentage: 5.18%  Waist Circumference (cm): 137 cm        11/1/2024     4:34 PM   Changes and Difficulties   I have made the following changes to my diet since my last visit: I eat less         MEDICATIONS:   Current Outpatient Medications   Medication Sig Dispense Refill     alcohol swab prep pads Use to swab area of injection/francisca as directed. 100 each 3     alpha-lipoic acid 600 MG capsule Take 600 mg by mouth       amLODIPine (NORVASC) 10 MG tablet Take 1 tablet (10 mg) by mouth daily. 90 tablet 3     augmented betamethasone dipropionate (DIPROLENE-AF) 0.05 % external ointment Apply twice daily as needed for rash on the legs 45 g 11     cephALEXin (KEFLEX) 500 MG capsule Take 1 capsule (500 mg) by mouth 3 times daily. 30 capsule 0     cholecalciferol (VITAMIN D3) 1000 UNIT tablet Take 1 tablet (1,000 Units) by mouth daily 100 tablet 3     Continuous Glucose Sensor (DEXCOM G6 SENSOR) MISC Change every 10 days. 9 each 3     Continuous Glucose Transmitter (DEXCOM G6 TRANSMITTER) " MISC Change every 3 months. 1 each 3     cyclobenzaprine (FLEXERIL) 10 MG tablet Take 1 tablet (10 mg) by mouth 3 times daily as needed for muscle spasms 90 tablet 3     doxazosin (CARDURA) 8 MG tablet Take 1 tablet (8 mg) by mouth at bedtime Take a total of 10 mg at bedtime 90 tablet 3     doxycycline hyclate (VIBRAMYCIN) 100 MG capsule Take 1 capsule (100 mg) by mouth 2 times daily. 10 capsule 0     empagliflozin (JARDIANCE) 10 MG TABS tablet Take 1 tablet (10 mg) by mouth daily. 90 tablet 1     fluticasone (FLONASE) 50 MCG/ACT nasal spray Spray 1 spray into both nostrils daily 20 mL 3     furosemide (LASIX) 40 MG tablet Take 1 tablet (40 mg) by mouth daily . 90 tablet 3     gabapentin (NEURONTIN) 800 MG tablet Take 1 tablet (800 mg) by mouth 3 times daily 90 tablet 11     hydrOXYzine (ATARAX) 25 MG tablet Take 1 tablet (25 mg) by mouth 3 times daily as needed for itching 90 tablet 3     Insulin Disposable Pump (OMNIPOD 5 G6 INTRO, GEN 5,) KIT 1 kit daily 1 kit 0     Insulin Disposable Pump (OMNIPOD 5 G6 POD, GEN 5,) MISC 1 each See Admin Instructions Use per 's instructions. 1 each 1     Insulin Disposable Pump (OMNIPOD 5 PODS, GEN 5,) MISC 1 each See Admin Instructions. Change every 2 days. Use for insulin administration. 45 each 3     Insulin Lispro (HUMALOG KWIKPEN) 200 UNIT/ML soln To be used in the insulin pump. Total daily dose up to 170 U. 72 mL 3     insulin pen needle (BD ENE U/F) 32G X 4 MM miscellaneous Use 1 pen needle 4 times daily or as directed. 400 each 1     lidocaine (LIDODERM) 5 % patch Place 1 patch onto the skin every 24 hours To prevent lidocaine toxicity, patient should be patch free for 12 hrs daily. 30 patch 11     lidocaine (XYLOCAINE) 5 % external ointment Apply topically as needed for moderate pain 90 g 11     losartan (COZAAR) 25 MG tablet Take 1 tablet (25 mg) by mouth daily 90 tablet 3     methocarbamol (ROBAXIN) 500 MG tablet Take 1 tablet (500 mg) by mouth 2 times  daily as needed for muscle spasms 60 tablet 0     metoprolol succinate ER (TOPROL XL) 200 MG 24 hr tablet Take 1 tablet (200 mg) by mouth daily 90 tablet 3     MOUNJARO 7.5 MG/0.5ML SOAJ 7.5 mg daily.       mycophenolic acid (GENERIC EQUIVALENT) 360 MG EC tablet Take 2 tablets (720 mg) by mouth every 12 hours 360 tablet 3     oxyCODONE (ROXICODONE) 5 MG tablet Take 1 tablet (5 mg) by mouth 4 times daily as needed for pain maximum 6 tablet(s) per day 30 tablet 0     predniSONE (DELTASONE) 2.5 MG tablet Take 1 tablet (2.5 mg) by mouth daily 90 tablet 3     tacrolimus (GENERIC EQUIVALENT) 1 MG capsule Take 4 capsules (4 mg) by mouth every morning AND 3 capsules (3 mg) every evening. 630 capsule 3     Tacrolimus 1 MG PACK        tirzepatide (MOUNJARO) 7.5 MG/0.5ML pen Inject 7.5 mg subcutaneously once a week. 3 mL 0     tretinoin (RETIN-A) 0.025 % external cream Apply a pea-sized amount evenly over the face at nighttime before bed 45 g 11     triamcinolone (KENALOG) 0.1 % external ointment Apply topically 2 times daily to the itching on the legs 80 g 1     ursodiol (ACTIGALL) 500 MG tablet Take 1 tablet (500 mg) by mouth 2 times daily 180 tablet 3           11/1/2024     4:34 PM   Weight Loss Medication History Reviewed With Patient   Which weight loss medications are you currently taking on a regular basis? None   If you are not taking a weight loss medication that was prescribed to you, please indicate why: Other   Are you having any side effects from the weight loss medication that we have prescribed you? No       Lab on 11/04/2024   Component Date Value Ref Range Status     Tacrolimus by Tandem Mass Spectrom* 11/04/2024 5.5  5.0 - 15.0 ug/L Final    Comment: Tacrolimus Reference Range (ug/L):    Kidney Transplant:  Pediatric  0-3 months post transplant: 10-12  3-6 months post transplant: 8-10  6-12 months post transplant: 6-8  >12 months post transplant: 4-7    Adult  0-6 months post transplant: 8-10  6-12 months  post transplant: 6-8  >12 months post transplant: 4-6  >5 years post transplant: 3-5    Heart Transplant:  Pediatric  0-12 months post transplant: 10-15  >12 months post transplant: 5-10    Adult  0-3 months post transplant: 10-15  3-6 months post transplant: 8-12  6-12 months post transplant: 6-12  >12 months post transplant: 6-10    Lung Transplant:  0-12 months post transplant: 10-15  >12 months post transplant: 8-12    Liver Transplant:  Pediatric  0-3 months post transplant: 10-15  3-6 months post transplant: 8-10  6 months-5 years post transplant: 6-8   >5 years post transplant: 1-3    Adult  0-3 months post transplant: 10-12  3-6 months post transplant: 8-10  >6 months post transplant: 6-8    Pancreas Transplant:  0-6                            months post transplant: 8-10  >6 months post transplant: 5-8     Tacrolimus Last Dose Date 11/04/2024 11/3/2024   Final     Tacrolimus Last Dose Time 11/04/2024  8:00 PM   Final           3/1/2024    10:57 AM 10/30/2024     2:19 PM   REGGIE Score (Last Two)   REGGIE Raw Score 40 38    Activation Score 100 83.7    REGGIE Level 4 4        Patient-reported         PHYSICAL EXAM:  Objective   /87 (BP Location: Left arm, Patient Position: Sitting, Cuff Size: Adult Large)   Pulse 70   Ht 1.829 m (6')   Wt 124.5 kg (274 lb 6.4 oz)   SpO2 99%   BMI 37.22 kg/m      /87 (BP Location: Left arm, Patient Position: Sitting, Cuff Size: Adult Large)   Pulse 70   Ht 1.829 m (6')   Wt 124.5 kg (274 lb 6.4 oz)   SpO2 99%   BMI 37.22 kg/m    Body mass index is 37.22 kg/m .  GENERAL: alert and no distress  EYES: Eyes grossly normal to inspection.  No discharge or erythema, or obvious scleral/conjunctival abnormalities.  RESP: No audible wheeze, cough, or visible cyanosis.    SKIN: Visible skin clear. No significant rash, abnormal pigmentation or lesions.  NEURO: Cranial nerves grossly intact.  Mentation and speech appropriate for age.  PSYCH: Appropriate affect, tone, and pace  of words        Sincerely,    Jennifer Vieira NP      15 minutes spent by me on the date of the encounter doing chart review, history and exam, documentation and further activities per the note    The longitudinal plan of care for the diagnosis(es)/condition(s) as documented were addressed during this visit. Due to the added complexity in care, I will continue to support Camacho in the subsequent management and with ongoing continuity of care.      Again, thank you for allowing me to participate in the care of your patient.      Sincerely,    Jennifer Vieira NP

## 2024-11-06 NOTE — PROGRESS NOTES
Return Medical Weight Management Note     Camacho Bhagat  MRN:  [id number]  :  1965  BISI:  2024    Dear Shay Kirkpatrick MD,    I had the pleasure of seeing your patient Camacho Bhagat. He is a 59 year old male who I am continuing to see for treatment of obesity related to:        2024     3:58 PM   --   I have the following health issues associated with obesity Type II Diabetes    High Blood Pressure    Sleep Apnea    Cancer    Osteoarthritis (joint disease)   I have the following symptoms associated with obesity Knee Pain    Lower Extremity Swelling    Back Pain    Fatigue    Hip Pain       Assessment & Plan   Problem List Items Addressed This Visit          Nervous and Auditory    Type 2 diabetes mellitus with diabetic polyneuropathy, with long-term current use of insulin (H)    Relevant Medications    MOUNJARO 7.5 MG/0.5ML SOAJ    Other Relevant Orders    Adult Bariatrics and Weight Management Clinic Follow-Up Order       Digestive    Class 3 severe obesity with serious comorbidity and body mass index (BMI) of 40.0 to 44.9 in adult (H) - Primary     Doing really well with mounjaro. Insulin needs have reduced. Blood sugars really well controlled. No increased frequency in hypoglycemia. No adverse side effects. Increased mounjaro 2 weeks ago to 7.5mg. will continue current plan.     Continue mounjaro 7.5mg   Keep up the great work   Follow up 3 months          Relevant Medications    MOUNJARO 7.5 MG/0.5ML SOAJ    Other Relevant Orders    Adult Bariatrics and Weight Management Clinic Follow-Up Order       Other    S/P liver transplant (H)          INTERVAL HISTORY:  New MWM 2024 BMI 40 7yr S/p liver transplant with DMII, CKD, HTN, RONNIE, OA, hx of DVT and fibromyalgia. DMII managed with insulin pump, cgm, and jardiance. Followed by hepatology and endocrinology.     Endocrinology increased mounjaro 10/2024 but he didn't get it started until 2 weeks ago.     Anti-obesity medication history    Current:  "  Mounjaro 7.5 mg (increased to 7.5 2 weeks ago per endocrinologist)   -no worsening nausea/ vomiting   - no indigestion  -no hypoglycemia   -no excess fatigue      Past/Failed/contraindicated:   Naltrexone- avoiding with hx liver transplant  Victoza- \"projectile vomiting'     Recent diet changes:   Prioritizes protein   3 meals a day   Appetite improving  Adequate hydration     Recent exercise/activity changes:   2 miles a day at work     Recent sleep changes: manageable     Vitamins/Labs: 11/2024    CURRENT WEIGHT:   274 lbs 6.4 oz    Initial Weight (lbs): 289.4 lbs     Cumulative weight loss (lbs): 15  Weight Loss Percentage: 5.18%  Waist Circumference (cm): 137 cm        11/1/2024     4:34 PM   Changes and Difficulties   I have made the following changes to my diet since my last visit: I eat less         MEDICATIONS:   Current Outpatient Medications   Medication Sig Dispense Refill    alcohol swab prep pads Use to swab area of injection/francisca as directed. 100 each 3    alpha-lipoic acid 600 MG capsule Take 600 mg by mouth      amLODIPine (NORVASC) 10 MG tablet Take 1 tablet (10 mg) by mouth daily. 90 tablet 3    augmented betamethasone dipropionate (DIPROLENE-AF) 0.05 % external ointment Apply twice daily as needed for rash on the legs 45 g 11    cephALEXin (KEFLEX) 500 MG capsule Take 1 capsule (500 mg) by mouth 3 times daily. 30 capsule 0    cholecalciferol (VITAMIN D3) 1000 UNIT tablet Take 1 tablet (1,000 Units) by mouth daily 100 tablet 3    Continuous Glucose Sensor (DEXCOM G6 SENSOR) MISC Change every 10 days. 9 each 3    Continuous Glucose Transmitter (DEXCOM G6 TRANSMITTER) MISC Change every 3 months. 1 each 3    cyclobenzaprine (FLEXERIL) 10 MG tablet Take 1 tablet (10 mg) by mouth 3 times daily as needed for muscle spasms 90 tablet 3    doxazosin (CARDURA) 8 MG tablet Take 1 tablet (8 mg) by mouth at bedtime Take a total of 10 mg at bedtime 90 tablet 3    doxycycline hyclate (VIBRAMYCIN) 100 MG " capsule Take 1 capsule (100 mg) by mouth 2 times daily. 10 capsule 0    empagliflozin (JARDIANCE) 10 MG TABS tablet Take 1 tablet (10 mg) by mouth daily. 90 tablet 1    fluticasone (FLONASE) 50 MCG/ACT nasal spray Spray 1 spray into both nostrils daily 20 mL 3    furosemide (LASIX) 40 MG tablet Take 1 tablet (40 mg) by mouth daily . 90 tablet 3    gabapentin (NEURONTIN) 800 MG tablet Take 1 tablet (800 mg) by mouth 3 times daily 90 tablet 11    hydrOXYzine (ATARAX) 25 MG tablet Take 1 tablet (25 mg) by mouth 3 times daily as needed for itching 90 tablet 3    Insulin Disposable Pump (OMNIPOD 5 G6 INTRO, GEN 5,) KIT 1 kit daily 1 kit 0    Insulin Disposable Pump (OMNIPOD 5 G6 POD, GEN 5,) MISC 1 each See Admin Instructions Use per 's instructions. 1 each 1    Insulin Disposable Pump (OMNIPOD 5 PODS, GEN 5,) MISC 1 each See Admin Instructions. Change every 2 days. Use for insulin administration. 45 each 3    Insulin Lispro (HUMALOG KWIKPEN) 200 UNIT/ML soln To be used in the insulin pump. Total daily dose up to 170 U. 72 mL 3    insulin pen needle (BD ENE U/F) 32G X 4 MM miscellaneous Use 1 pen needle 4 times daily or as directed. 400 each 1    lidocaine (LIDODERM) 5 % patch Place 1 patch onto the skin every 24 hours To prevent lidocaine toxicity, patient should be patch free for 12 hrs daily. 30 patch 11    lidocaine (XYLOCAINE) 5 % external ointment Apply topically as needed for moderate pain 90 g 11    losartan (COZAAR) 25 MG tablet Take 1 tablet (25 mg) by mouth daily 90 tablet 3    methocarbamol (ROBAXIN) 500 MG tablet Take 1 tablet (500 mg) by mouth 2 times daily as needed for muscle spasms 60 tablet 0    metoprolol succinate ER (TOPROL XL) 200 MG 24 hr tablet Take 1 tablet (200 mg) by mouth daily 90 tablet 3    MOUNJARO 7.5 MG/0.5ML SOAJ 7.5 mg daily.      mycophenolic acid (GENERIC EQUIVALENT) 360 MG EC tablet Take 2 tablets (720 mg) by mouth every 12 hours 360 tablet 3    oxyCODONE (ROXICODONE) 5  MG tablet Take 1 tablet (5 mg) by mouth 4 times daily as needed for pain maximum 6 tablet(s) per day 30 tablet 0    predniSONE (DELTASONE) 2.5 MG tablet Take 1 tablet (2.5 mg) by mouth daily 90 tablet 3    tacrolimus (GENERIC EQUIVALENT) 1 MG capsule Take 4 capsules (4 mg) by mouth every morning AND 3 capsules (3 mg) every evening. 630 capsule 3    Tacrolimus 1 MG PACK       tirzepatide (MOUNJARO) 7.5 MG/0.5ML pen Inject 7.5 mg subcutaneously once a week. 3 mL 0    tretinoin (RETIN-A) 0.025 % external cream Apply a pea-sized amount evenly over the face at nighttime before bed 45 g 11    triamcinolone (KENALOG) 0.1 % external ointment Apply topically 2 times daily to the itching on the legs 80 g 1    ursodiol (ACTIGALL) 500 MG tablet Take 1 tablet (500 mg) by mouth 2 times daily 180 tablet 3           11/1/2024     4:34 PM   Weight Loss Medication History Reviewed With Patient   Which weight loss medications are you currently taking on a regular basis? None   If you are not taking a weight loss medication that was prescribed to you, please indicate why: Other   Are you having any side effects from the weight loss medication that we have prescribed you? No       Lab on 11/04/2024   Component Date Value Ref Range Status    Tacrolimus by Tandem Mass Spectrom* 11/04/2024 5.5  5.0 - 15.0 ug/L Final    Comment: Tacrolimus Reference Range (ug/L):    Kidney Transplant:  Pediatric  0-3 months post transplant: 10-12  3-6 months post transplant: 8-10  6-12 months post transplant: 6-8  >12 months post transplant: 4-7    Adult  0-6 months post transplant: 8-10  6-12 months post transplant: 6-8  >12 months post transplant: 4-6  >5 years post transplant: 3-5    Heart Transplant:  Pediatric  0-12 months post transplant: 10-15  >12 months post transplant: 5-10    Adult  0-3 months post transplant: 10-15  3-6 months post transplant: 8-12  6-12 months post transplant: 6-12  >12 months post transplant: 6-10    Lung Transplant:  0-12  months post transplant: 10-15  >12 months post transplant: 8-12    Liver Transplant:  Pediatric  0-3 months post transplant: 10-15  3-6 months post transplant: 8-10  6 months-5 years post transplant: 6-8   >5 years post transplant: 1-3    Adult  0-3 months post transplant: 10-12  3-6 months post transplant: 8-10  >6 months post transplant: 6-8    Pancreas Transplant:  0-6                            months post transplant: 8-10  >6 months post transplant: 5-8    Tacrolimus Last Dose Date 11/04/2024 11/3/2024   Final    Tacrolimus Last Dose Time 11/04/2024  8:00 PM   Final           3/1/2024    10:57 AM 10/30/2024     2:19 PM   REGGIE Score (Last Two)   REGGIE Raw Score 40 38    Activation Score 100 83.7    REGGIE Level 4 4        Patient-reported         PHYSICAL EXAM:  Objective    /87 (BP Location: Left arm, Patient Position: Sitting, Cuff Size: Adult Large)   Pulse 70   Ht 1.829 m (6')   Wt 124.5 kg (274 lb 6.4 oz)   SpO2 99%   BMI 37.22 kg/m      /87 (BP Location: Left arm, Patient Position: Sitting, Cuff Size: Adult Large)   Pulse 70   Ht 1.829 m (6')   Wt 124.5 kg (274 lb 6.4 oz)   SpO2 99%   BMI 37.22 kg/m    Body mass index is 37.22 kg/m .  GENERAL: alert and no distress  EYES: Eyes grossly normal to inspection.  No discharge or erythema, or obvious scleral/conjunctival abnormalities.  RESP: No audible wheeze, cough, or visible cyanosis.    SKIN: Visible skin clear. No significant rash, abnormal pigmentation or lesions.  NEURO: Cranial nerves grossly intact.  Mentation and speech appropriate for age.  PSYCH: Appropriate affect, tone, and pace of words        Sincerely,    Jennifer Vieira NP      15 minutes spent by me on the date of the encounter doing chart review, history and exam, documentation and further activities per the note    The longitudinal plan of care for the diagnosis(es)/condition(s) as documented were addressed during this visit. Due to the added complexity in care, I will continue  to support Camacho in the subsequent management and with ongoing continuity of care.

## 2024-11-06 NOTE — TELEPHONE ENCOUNTER
Patient confirmed scheduled appointment:  Date: 3/19/25  Time: 1:30 pm  Visit type: KALLIE YO  Provider: Jennifer Vieira  Location: [facility] - 4th floor  Testing/imaging: n/a  Additional notes: n/a

## 2024-11-06 NOTE — NURSING NOTE
(   Chief Complaint   Patient presents with    RECHECK     Return MWM    )    ( Weight: 124.5 kg (274 lb 6.4 oz) )  ( Height: 182.9 cm (6') )  ( BMI (Calculated): 37.21 )  (   )  (   )  (   )  (   )  (   )  (   )    ( BP: 135/87 (Pt report recent BP med change) )  (   )  (   )  (   )  ( Pulse: 70 )  (   )  ( SpO2: 99 % )    (   Patient Active Problem List   Diagnosis    Carpal tunnel syndrome    Testicular hypofunction    Sicca syndrome (H)    Hepatocellular carcinoma (H)    Osteoarthritis    Rheumatoid arthritis (H)    Hyperkalemia    Liver transplant recipient (H)    Immunosuppressed status (H)    Neutropenic colitis (H)    S/P liver transplant (H)    Pancytopenia (H)    Ischemia of both lower extremities    Elevated serum creatinine    Abscess, intra-abdominal, postoperative (H)    Liver transplant rejection (H)    CMV colitis (H)    Type 2 diabetes mellitus with diabetic polyneuropathy, with long-term current use of insulin (H)    Hypertension secondary to other renal disorders    Chronic kidney disease, stage 3 (H)    Class 3 severe obesity with serious comorbidity and body mass index (BMI) of 40.0 to 44.9 in adult (H)    F11.9 - Chronic, continuous use of opioids    Fibromyalgia    Combined forms of age-related cataract of both eyes    )  (   Current Outpatient Medications   Medication Sig Dispense Refill    alcohol swab prep pads Use to swab area of injection/francisca as directed. 100 each 3    alpha-lipoic acid 600 MG capsule Take 600 mg by mouth      amLODIPine (NORVASC) 10 MG tablet Take 1 tablet (10 mg) by mouth daily. 90 tablet 3    augmented betamethasone dipropionate (DIPROLENE-AF) 0.05 % external ointment Apply twice daily as needed for rash on the legs 45 g 11    cephALEXin (KEFLEX) 500 MG capsule Take 1 capsule (500 mg) by mouth 3 times daily. 30 capsule 0    cholecalciferol (VITAMIN D3) 1000 UNIT tablet Take 1 tablet (1,000 Units) by mouth daily 100 tablet 3    Continuous Glucose Sensor (DEXCOM G6  SENSOR) MISC Change every 10 days. 9 each 3    Continuous Glucose Transmitter (DEXCOM G6 TRANSMITTER) MISC Change every 3 months. 1 each 3    cyclobenzaprine (FLEXERIL) 10 MG tablet Take 1 tablet (10 mg) by mouth 3 times daily as needed for muscle spasms 90 tablet 3    doxazosin (CARDURA) 8 MG tablet Take 1 tablet (8 mg) by mouth at bedtime Take a total of 10 mg at bedtime 90 tablet 3    doxycycline hyclate (VIBRAMYCIN) 100 MG capsule Take 1 capsule (100 mg) by mouth 2 times daily. 10 capsule 0    empagliflozin (JARDIANCE) 10 MG TABS tablet Take 1 tablet (10 mg) by mouth daily. 90 tablet 1    fluticasone (FLONASE) 50 MCG/ACT nasal spray Spray 1 spray into both nostrils daily 20 mL 3    furosemide (LASIX) 40 MG tablet Take 1 tablet (40 mg) by mouth daily . 90 tablet 3    gabapentin (NEURONTIN) 800 MG tablet Take 1 tablet (800 mg) by mouth 3 times daily 90 tablet 11    hydrOXYzine (ATARAX) 25 MG tablet Take 1 tablet (25 mg) by mouth 3 times daily as needed for itching 90 tablet 3    Insulin Disposable Pump (OMNIPOD 5 G6 INTRO, GEN 5,) KIT 1 kit daily 1 kit 0    Insulin Disposable Pump (OMNIPOD 5 G6 POD, GEN 5,) MISC 1 each See Admin Instructions Use per 's instructions. 1 each 1    Insulin Disposable Pump (OMNIPOD 5 PODS, GEN 5,) MISC 1 each See Admin Instructions. Change every 2 days. Use for insulin administration. 45 each 3    Insulin Lispro (HUMALOG KWIKPEN) 200 UNIT/ML soln To be used in the insulin pump. Total daily dose up to 170 U. 72 mL 3    insulin pen needle (BD ENE U/F) 32G X 4 MM miscellaneous Use 1 pen needle 4 times daily or as directed. 400 each 1    lidocaine (LIDODERM) 5 % patch Place 1 patch onto the skin every 24 hours To prevent lidocaine toxicity, patient should be patch free for 12 hrs daily. 30 patch 11    lidocaine (XYLOCAINE) 5 % external ointment Apply topically as needed for moderate pain 90 g 11    losartan (COZAAR) 25 MG tablet Take 1 tablet (25 mg) by mouth daily 90 tablet 3     methocarbamol (ROBAXIN) 500 MG tablet Take 1 tablet (500 mg) by mouth 2 times daily as needed for muscle spasms 60 tablet 0    metoprolol succinate ER (TOPROL XL) 200 MG 24 hr tablet Take 1 tablet (200 mg) by mouth daily 90 tablet 3    MOUNJARO 7.5 MG/0.5ML SOAJ 7.5 mg daily.      mycophenolic acid (GENERIC EQUIVALENT) 360 MG EC tablet Take 2 tablets (720 mg) by mouth every 12 hours 360 tablet 3    oxyCODONE (ROXICODONE) 5 MG tablet Take 1 tablet (5 mg) by mouth 4 times daily as needed for pain maximum 6 tablet(s) per day 30 tablet 0    predniSONE (DELTASONE) 2.5 MG tablet Take 1 tablet (2.5 mg) by mouth daily 90 tablet 3    tacrolimus (GENERIC EQUIVALENT) 1 MG capsule Take 4 capsules (4 mg) by mouth every morning AND 3 capsules (3 mg) every evening. 630 capsule 3    Tacrolimus 1 MG PACK       tirzepatide (MOUNJARO) 7.5 MG/0.5ML pen Inject 7.5 mg subcutaneously once a week. 3 mL 0    tretinoin (RETIN-A) 0.025 % external cream Apply a pea-sized amount evenly over the face at nighttime before bed 45 g 11    triamcinolone (KENALOG) 0.1 % external ointment Apply topically 2 times daily to the itching on the legs 80 g 1    ursodiol (ACTIGALL) 500 MG tablet Take 1 tablet (500 mg) by mouth 2 times daily 180 tablet 3    tirzepatide (MOUNJARO) 2.5 MG/0.5ML pen Inject 2.5 mg Subcutaneous every 7 days (Patient not taking: Reported on 11/6/2024) 2 mL 1    tirzepatide (MOUNJARO) 5 MG/0.5ML pen Inject 5 mg subcutaneously every 7 days. (Patient not taking: Reported on 11/6/2024) 2 mL 2    )  ( Diabetes Eval:    )    ( Pain Eval:  Severe Pain (7) )    ( Wound Eval:       )    (   History   Smoking Status    Former    Types: Cigarettes, Cigars, Dip, chew, snus or snuff   Smokeless Tobacco    Former    Types: Chew    Quit date: 10/8/2015    )    ( Signed By:  Angelique Sanchez; November 6, 2024; 1:29 PM )

## 2024-11-07 ENCOUNTER — MYC MEDICAL ADVICE (OUTPATIENT)
Dept: INTERNAL MEDICINE | Facility: CLINIC | Age: 59
End: 2024-11-07
Payer: COMMERCIAL

## 2024-11-07 DIAGNOSIS — M15.0 PRIMARY OSTEOARTHRITIS INVOLVING MULTIPLE JOINTS: ICD-10-CM

## 2024-11-07 NOTE — ASSESSMENT & PLAN NOTE
Doing really well with mounjaro. Insulin needs have reduced. Blood sugars really well controlled. No increased frequency in hypoglycemia. No adverse side effects. Increased mounjaro 2 weeks ago to 7.5mg. will continue current plan.     Continue mounjaro 7.5mg   Keep up the great work   Follow up 3 months

## 2024-11-08 RX ORDER — OXYCODONE HYDROCHLORIDE 5 MG/1
5 TABLET ORAL 4 TIMES DAILY PRN
Qty: 30 TABLET | Refills: 0 | Status: SHIPPED | OUTPATIENT
Start: 2024-11-08

## 2024-11-08 NOTE — TELEPHONE ENCOUNTER
Spoke to Camacho Bhagat. He confirms the level on 11/4 was in fact a 12 hour level. No dose change recommended. Camacho will continue routine lab schedule.

## 2024-11-11 ENCOUNTER — OFFICE VISIT (OUTPATIENT)
Dept: OPHTHALMOLOGY | Facility: CLINIC | Age: 59
End: 2024-11-11
Payer: COMMERCIAL

## 2024-11-11 ENCOUNTER — MYC MEDICAL ADVICE (OUTPATIENT)
Dept: ENDOCRINOLOGY | Facility: CLINIC | Age: 59
End: 2024-11-11
Payer: COMMERCIAL

## 2024-11-11 DIAGNOSIS — Z79.4 TYPE 2 DIABETES MELLITUS WITH DIABETIC POLYNEUROPATHY, WITH LONG-TERM CURRENT USE OF INSULIN (H): Primary | ICD-10-CM

## 2024-11-11 DIAGNOSIS — E11.3213 TYPE 2 DIABETES MELLITUS WITH BOTH EYES AFFECTED BY MILD NONPROLIFERATIVE RETINOPATHY AND MACULAR EDEMA, WITH LONG-TERM CURRENT USE OF INSULIN (H): ICD-10-CM

## 2024-11-11 DIAGNOSIS — H35.033 HYPERTENSIVE RETINOPATHY OF BOTH EYES: Primary | ICD-10-CM

## 2024-11-11 DIAGNOSIS — E11.42 TYPE 2 DIABETES MELLITUS WITH DIABETIC POLYNEUROPATHY, WITH LONG-TERM CURRENT USE OF INSULIN (H): Primary | ICD-10-CM

## 2024-11-11 DIAGNOSIS — Z79.4 TYPE 2 DIABETES MELLITUS WITH BOTH EYES AFFECTED BY MILD NONPROLIFERATIVE RETINOPATHY AND MACULAR EDEMA, WITH LONG-TERM CURRENT USE OF INSULIN (H): ICD-10-CM

## 2024-11-11 PROCEDURE — 99213 OFFICE O/P EST LOW 20 MIN: CPT

## 2024-11-11 PROCEDURE — 92134 CPTRZ OPH DX IMG PST SGM RTA: CPT

## 2024-11-11 PROCEDURE — 92250 FUNDUS PHOTOGRAPHY W/I&R: CPT

## 2024-11-11 RX ORDER — TIRZEPATIDE 7.5 MG/.5ML
7.5 INJECTION, SOLUTION SUBCUTANEOUS
Qty: 2 ML | Refills: 0 | Status: SHIPPED | OUTPATIENT
Start: 2024-11-11

## 2024-11-11 ASSESSMENT — VISUAL ACUITY
OS_CC+: -2
OD_CC: 20/25
METHOD: SNELLEN - LINEAR
CORRECTION_TYPE: GLASSES
OD_CC+: -1
OS_CC: 20/40

## 2024-11-11 ASSESSMENT — REFRACTION_WEARINGRX
OD_CYLINDER: SPHERE
OS_AXIS: 006
OS_ADD: +2.25
OD_SPHERE: -0.25
OS_CYLINDER: +0.25
OD_ADD: +2.25
OS_SPHERE: +1.50

## 2024-11-11 ASSESSMENT — TONOMETRY
OS_IOP_MMHG: 14
IOP_METHOD: TONOPEN
OD_IOP_MMHG: 15

## 2024-11-11 ASSESSMENT — CONF VISUAL FIELD
OD_INFERIOR_TEMPORAL_RESTRICTION: 0
OD_INFERIOR_NASAL_RESTRICTION: 0
OS_SUPERIOR_TEMPORAL_RESTRICTION: 0
OD_SUPERIOR_TEMPORAL_RESTRICTION: 0
METHOD: COUNTING FINGERS
OS_INFERIOR_NASAL_RESTRICTION: 0
OD_NORMAL: 1
OS_NORMAL: 1
OS_SUPERIOR_NASAL_RESTRICTION: 0
OS_INFERIOR_TEMPORAL_RESTRICTION: 0
OD_SUPERIOR_NASAL_RESTRICTION: 0

## 2024-11-11 ASSESSMENT — CUP TO DISC RATIO
OD_RATIO: 0.1
OS_RATIO: 0.1

## 2024-11-11 ASSESSMENT — SLIT LAMP EXAM - LIDS
COMMENTS: NORMAL
COMMENTS: NORMAL

## 2024-11-11 ASSESSMENT — EXTERNAL EXAM - RIGHT EYE: OD_EXAM: NORMAL

## 2024-11-11 NOTE — NURSING NOTE
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 mild nonproliferative retinopathy and macular edema, with long-term current use of insulin              Comments    Ref by Dr Roque  Decreased vision in both eye   No flashes or floaters  No eye pain or redness  LBS:  125   Last A1C: 6.2  Lab Results       Component                Value               Date                       A1C                      6.2                 07/05/2024                 A1C                      5.8                 03/01/2024                 A1C                      6.2                 11/30/2023                 A1C                      5.7                 07/31/2023                 A1C                      6.0                 09/27/2022                 A1C                      6.3                 03/31/2021                 A1C                      6.7                 02/12/2021                 A1C                      6.4                 11/24/2020                 A1C                      6.2                 10/06/2020                 A1C                      5.5                 04/11/2018              Casandra Villanueva COT 1:45 PM November 11, 2024

## 2024-11-11 NOTE — PROGRESS NOTES
CC: Macular edema evaluation     HPI: 59 year old male with history of DM type II, hepatocellular carcinoma, RA, liver transplant, CMV colitis and CKD who presents for evaluation of macular edema.     Following with Dr. Roque who referred for macular edema each eye.     His right eye vision is excellent and left eye vision has been worsening.     Had LASIK   CE IOL right eye with Jude 2/1/24     PMHx:   DM type II  HCC s/p transplant   RA  CKD    Imaging:  OCT: 11/11/2024  Right eye: cystoid IRF and exudate improved compared to 9/25/24   Left eye: cystoid IRF improved compared to 9/25/24     Retina Laser procedures:  None    Intravitreal injections:  None    Assessment/ Plan: 11/11/2024       # Mild NPDR with macular edema each eye   - Most likely due to diabetes rather than post surgical given bilateral   - Mild edema and VA 20/25 and 20/40 (mostly due to cataract) and is getting better on OCT therefore will continue to observe  - A1c 6.2 have been under excellent control for years    # Mild hypertensive retinopathy   Garde 1 both eyes   BP~170s   Recently started amlodipine  Patient is seeking to control his BP, will need to see back in 8-9 months for repeat DFE     # Combined cataract left eye   - likely visually significant, from retina perspective ok to proceed with CE IOL    Follow up In 3-4 months with OCT macula both eyes earlier if any symptoms      Mitch Kang MD  Resident Physician, PGY-3  Department of Ophthalmology     Taye Parr MD     Medical Retina  [facility]     Attending Physician Attestation:  Complete documentation of historical and exam elements from today's encounter can be found in the full encounter summary report (not reduplicated in this progress note).  I personally obtained the chief complaint(s) and history of present illness.  I confirmed and edited as necessary the review of systems, past medical/surgical history, family history,  social history, and examination findings as documented by others; and I examined the patient myself.  I personally reviewed the relevant tests, images, and reports as documented above.  I formulated and edited as necessary the assessment and plan and discussed the findings and management plan with the patient and family. Taye Parr MD

## 2024-11-25 ENCOUNTER — MYC MEDICAL ADVICE (OUTPATIENT)
Dept: NEPHROLOGY | Facility: CLINIC | Age: 59
End: 2024-11-25
Payer: COMMERCIAL

## 2024-11-25 DIAGNOSIS — I10 HTN (HYPERTENSION): Primary | ICD-10-CM

## 2024-11-26 ENCOUNTER — TELEPHONE (OUTPATIENT)
Dept: NEPHROLOGY | Facility: CLINIC | Age: 59
End: 2024-11-26
Payer: COMMERCIAL

## 2024-11-26 NOTE — TELEPHONE ENCOUNTER
Left Voicemail (1st Attempt) and Sent Mychart (1st Attempt) for the patient to call back and schedule the following:    Appointment type: Return Nephrology  Provider: Jovanna Cazares  Return date: Next avail  Specialty phone number: [phone]  Additional appointment(s) needed: Labs prior  Additonal Notes: 1/6 resched

## 2024-12-02 ENCOUNTER — TELEPHONE (OUTPATIENT)
Dept: NEPHROLOGY | Facility: CLINIC | Age: 59
End: 2024-12-02
Payer: COMMERCIAL

## 2024-12-02 RX ORDER — HYDRALAZINE HYDROCHLORIDE 25 MG/1
25 TABLET, FILM COATED ORAL 2 TIMES DAILY
Qty: 180 TABLET | Refills: 3 | Status: SHIPPED | OUTPATIENT
Start: 2024-12-02

## 2024-12-02 NOTE — TELEPHONE ENCOUNTER
Sent Mychart (1st Attempt) and Left Voicemail (2nd Attempt) for the patient to call back and schedule the following:    Appointment type: Return Nephrology  Provider: Jovanna Cazares  Return date: Next avail  Specialty phone number: [phone]  Additional appointment(s) needed: Labs prior  Additonal Notes: 1/6 resched

## 2024-12-02 NOTE — TELEPHONE ENCOUNTER
Nephrology Note: RN CC Chart Review    REASON FOR ENCOUNTER:     Chart reviewed by nephrology RN CC                          SITUATION/BACKROUND:       LOUISE  Maurosarah one of the liver nurses sent me the same info minus the Doxazosin. If he's already taking 10 mg at bedtime then I'm not going to increase  Will have to add Hydralazine 25 mg bid  If you could check in on him in a week after starting that would be great     Last nephrology visit:    Last Renal Panel:  Sodium   Date Value Ref Range Status   11/01/2024 137 135 - 145 mmol/L Final   06/08/2021 134 133 - 144 mmol/L Final     Potassium   Date Value Ref Range Status   11/01/2024 5.1 3.4 - 5.3 mmol/L Final   01/30/2023 5.3 3.4 - 5.3 mmol/L Final   06/08/2021 4.9 3.4 - 5.3 mmol/L Final     Chloride   Date Value Ref Range Status   11/01/2024 107 98 - 107 mmol/L Final   01/30/2023 114 (H) 94 - 109 mmol/L Final   06/08/2021 105 94 - 109 mmol/L Final     Carbon Dioxide   Date Value Ref Range Status   06/08/2021 23 20 - 32 mmol/L Final     Carbon Dioxide (CO2)   Date Value Ref Range Status   11/01/2024 22 22 - 29 mmol/L Final   01/30/2023 22 20 - 32 mmol/L Final     Anion Gap   Date Value Ref Range Status   11/01/2024 8 7 - 15 mmol/L Final   01/30/2023 3 3 - 14 mmol/L Final   06/08/2021 6 3 - 14 mmol/L Final     Glucose   Date Value Ref Range Status   11/01/2024 104 (H) 70 - 99 mg/dL Final   01/30/2023 78 70 - 99 mg/dL Final   06/08/2021 249 (H) 70 - 99 mg/dL Final     GLUCOSE BY METER POCT   Date Value Ref Range Status   02/01/2024 86 70 - 99 mg/dL Final     Urea Nitrogen   Date Value Ref Range Status   11/01/2024 32.9 (H) 8.0 - 23.0 mg/dL Final   01/30/2023 48 (H) 7 - 30 mg/dL Final   06/08/2021 43 (H) 7 - 30 mg/dL Final     Creatinine   Date Value Ref Range Status   11/01/2024 1.94 (H) 0.67 - 1.17 mg/dL Final   06/08/2021 1.51 (H) 0.66 - 1.25 mg/dL Final     GFR Estimate   Date Value Ref Range Status   11/01/2024 39 (L) >60 mL/min/1.73m2 Final     Comment:     eGFR  calculated using 2021 CKD-EPI equation.   06/08/2021 51 (L) >60 mL/min/[1.73_m2] Final     Comment:     Non  GFR Calc  Starting 12/18/2018, serum creatinine based estimated GFR (eGFR) will be   calculated using the Chronic Kidney Disease Epidemiology Collaboration   (CKD-EPI) equation.       Calcium   Date Value Ref Range Status   11/01/2024 9.1 8.8 - 10.4 mg/dL Final     Comment:     Reference intervals for this test were updated on 7/16/2024 to reflect our healthy population more accurately. There may be differences in the flagging of prior results with similar values performed with this method. Those prior results can be interpreted in the context of the updated reference intervals.   06/08/2021 8.9 8.5 - 10.1 mg/dL Final     Phosphorus   Date Value Ref Range Status   11/01/2024 3.9 2.5 - 4.5 mg/dL Final   05/27/2021 3.4 2.5 - 4.5 mg/dL Final     Albumin   Date Value Ref Range Status   11/01/2024 4.1 3.5 - 5.2 g/dL Final   01/30/2023 3.8 3.4 - 5.0 g/dL Final   05/27/2021 4.1 3.4 - 5.0 g/dL Final         Neph Tracking Status:  Neph Tracking Flowsheet Last Filled Values       None              ASSESSMENT/PLAN   Confirmed patient is taking doxazosin, to start hydralazine    Patient to follow up as scheduled at next appointment  Patient to call/PayTouchhart message with updates    Upcoming Appointments:  Future Appts Next 180 days       No appointments to display              Janel Reyes RN

## 2024-12-09 ENCOUNTER — MYC MEDICAL ADVICE (OUTPATIENT)
Dept: ENDOCRINOLOGY | Facility: CLINIC | Age: 59
End: 2024-12-09
Payer: COMMERCIAL

## 2024-12-09 DIAGNOSIS — E11.42 TYPE 2 DIABETES MELLITUS WITH DIABETIC POLYNEUROPATHY, WITH LONG-TERM CURRENT USE OF INSULIN (H): Primary | ICD-10-CM

## 2024-12-09 DIAGNOSIS — Z79.4 TYPE 2 DIABETES MELLITUS WITH DIABETIC POLYNEUROPATHY, WITH LONG-TERM CURRENT USE OF INSULIN (H): Primary | ICD-10-CM

## 2024-12-09 RX ORDER — TIRZEPATIDE 7.5 MG/.5ML
7.5 INJECTION, SOLUTION SUBCUTANEOUS
Qty: 2 ML | Refills: 1 | Status: SHIPPED | OUTPATIENT
Start: 2024-12-09

## 2024-12-10 DIAGNOSIS — Z94.4 LIVER REPLACED BY TRANSPLANT (H): Primary | ICD-10-CM

## 2024-12-10 DIAGNOSIS — M48.061 LUMBAR SPINAL STENOSIS: ICD-10-CM

## 2024-12-10 DIAGNOSIS — G89.29 CHRONIC LOW BACK PAIN: Primary | ICD-10-CM

## 2024-12-10 DIAGNOSIS — M54.50 CHRONIC LOW BACK PAIN: Primary | ICD-10-CM

## 2024-12-10 DIAGNOSIS — M54.2 NECK PAIN: ICD-10-CM

## 2024-12-19 NOTE — TELEPHONE ENCOUNTER
DIAGNOSIS: NECK AND BACK PAIN   APPOINTMENT DATE: 12/23/2024   NOTES STATUS DETAILS   OFFICE NOTE from referring provider SELF    OFFICE NOTE from other specialist Internal 06/22/2023 - Ronaldo Doty MD - [facility] Sports Medicine    10/20/2020 - Isidro Mcleod MD - [facility] Orthopaedic Surgery   OPERATIVE REPORT Internal    DEXA PACS Internal:  Most Recent -  04/02/2024   INJECTIONS DONE IN RADIOLOGY PACS Internal   (IMAGES & REPORTS) PACS Internal

## 2024-12-23 ENCOUNTER — OFFICE VISIT (OUTPATIENT)
Dept: ORTHOPEDICS | Facility: CLINIC | Age: 59
End: 2024-12-23
Payer: COMMERCIAL

## 2024-12-23 ENCOUNTER — ANCILLARY PROCEDURE (OUTPATIENT)
Dept: GENERAL RADIOLOGY | Facility: CLINIC | Age: 59
End: 2024-12-23
Attending: PHYSICIAN ASSISTANT
Payer: COMMERCIAL

## 2024-12-23 ENCOUNTER — PRE VISIT (OUTPATIENT)
Dept: ORTHOPEDICS | Facility: CLINIC | Age: 59
End: 2024-12-23

## 2024-12-23 DIAGNOSIS — M54.2 NECK PAIN: ICD-10-CM

## 2024-12-23 DIAGNOSIS — M70.62 GREATER TROCHANTERIC BURSITIS OF BOTH HIPS: ICD-10-CM

## 2024-12-23 DIAGNOSIS — M70.61 GREATER TROCHANTERIC BURSITIS OF BOTH HIPS: ICD-10-CM

## 2024-12-23 DIAGNOSIS — G89.29 CHRONIC MIDLINE LOW BACK PAIN WITHOUT SCIATICA: Primary | ICD-10-CM

## 2024-12-23 DIAGNOSIS — M48.061 LUMBAR SPINAL STENOSIS: ICD-10-CM

## 2024-12-23 DIAGNOSIS — G89.29 CHRONIC LOW BACK PAIN: ICD-10-CM

## 2024-12-23 DIAGNOSIS — M54.50 CHRONIC MIDLINE LOW BACK PAIN WITHOUT SCIATICA: Primary | ICD-10-CM

## 2024-12-23 DIAGNOSIS — M54.50 CHRONIC LOW BACK PAIN: ICD-10-CM

## 2024-12-23 DIAGNOSIS — M47.816 LUMBAR SPONDYLOSIS: ICD-10-CM

## 2024-12-23 PROCEDURE — 77073 BONE LENGTH STUDIES: CPT | Performed by: STUDENT IN AN ORGANIZED HEALTH CARE EDUCATION/TRAINING PROGRAM

## 2024-12-23 PROCEDURE — 72082 X-RAY EXAM ENTIRE SPI 2/3 VW: CPT | Performed by: STUDENT IN AN ORGANIZED HEALTH CARE EDUCATION/TRAINING PROGRAM

## 2024-12-23 NOTE — Clinical Note
"12/23/2024      Camacho Bhagat  [address]  [place] [facility] [address]      Dear Colleague,    Thank you for referring your patient, Camacho Bhagat, to the [facility] [place]. Please see a copy of my visit note below.    Camacho Bhagat complains of  No chief complaint on file.      I have reviewed and updated the patient's Past Medical History, Social History, Family History and Medication List.    ALLERGIES  Erythromycin, Losartan, and Vioxx    Spine Surgery Consultation    REFERRING PHYSICIAN: No ref. provider found   PRIMARY CARE PHYSICIAN: Shay Kirkpatrick           Chief Complaint:   No chief complaint on file.      History of Present Illness:  Symptom Profile Including: location of symptoms, onset, severity, exacerbating/alleviating factors, previous treatments:        Camacho Bhagat is a 59 year old male who presents today for evaluation of neck pain, thoracic pain, lumbar pain, SI joint pain.     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  He has history of liver transplant.  Reports pain in axial neck with radiation into the shoulder areas; pain is worse in the morning when he wakes up and improves with getting moving in the day.  Reports pain due to both shoulders being \"messed up\", but denies pain radiating into the arms or fingers.  No numbness and tingling in the arms or fingers.  Has pain in thoracolumbar region, lumbar spine, bilateral lateral hips.  More recently, about 3 to 4 weeks, his mid back pain has been bothering him more.  Back pain is worse with sitting and right when he gets up in the morning.  Pain in the back is improved with standing and extending the spine.  His leg pain is localized to the bilateral lateral hips.  Pain is divided into about 80% back pain, 20% bilateral hip pain, right equal to left.  Hip pain is worse with movement.  Has history of getting bilateral " greater trochanter injections which he reports was very helpful.  Also has history of intra-articular hip injections, and has been seen by Dr. Mcleod in 2020; plan at that time was to continue with non-op management. He does feel legs are getting weaker with time; trying to walk down his long driveway he has to stop due to pain/ fatigue. Has numbness in both feet up to the level of the knees. Denies changes to bowel/bladder function. Did have DANNY in the past which helped 6-12 months. Is on various pain medications, see below.     Past treatments tried:  - Physical therapy: has not completed recently  - Injections:   2/15/24 L4-5 translaminar DANNY: per patient, not as helpful as subsequent L2-3 DANNY  6/7/23 L2-3 translaminar DANNY: per patient, helped significantly, lasted 6-12 months.   9/13/22 bilateral greater trochanter injections (Soren) - helped a lot  - Medications: can't take NSAIDs. Has PRN oxycodone 5mg, but takes very rarely. Has PRN methocarbamol. Takes 800mg gabapenin HS. Is on daily low dose prednisone      KEVIN Scores:  Oswestry (KEVIN) Questionnaire        12/17/2024    12:11 PM   OSWESTRY DISABILITY INDEX   Count 9    Sum 18    Oswestry Score (%) 40 %        Patient-reported        Neck Disability Index (NDI) Questionnaire        12/17/2024    12:15 PM   Neck Disability Index (NDI)   Neck Disability Index: Count 10   NDI: Total Score = SUM (points for all 10 findings) 11   Neck Disability in Percent = (Total Score) / 50 * 100 22 (%)        PROMIS-10 Scores:  Global Mental Health Score: (Patient-Rptd) (P) 18  Global Physical Health Score: (Patient-Rptd) (P) 13  PROMIS TOTAL - SUBSCORES: (Patient-Rptd) (P) 31         Physical Exam:    Constitutional - Patient is healthy, well-nourished and appears stated age   Respiratory - Patient is breathing normally and in no respiratory distress.   Skin - No suspicious rashes or lesions.   Psychiatric - Normal mood and affect.   Cardiovascular - Extremities warm  and well perfused.   Eyes - Visual acuity is normal to the written word.   ENT - Hearing intact to the spoken word.   GI - No abdominal distention.   Musculoskeletal - Non-antalgic gait without use of assistive devices.  Able to walk on heels and toes without significant weakness.  Full, painless flexion, extension, lateral rotation of spine.  Tender to palpation around T10-L2 midline.  Nontender to palpation paraspinal muscles, mildly tender to bilateral PSIS.  Tender to palpation bilateral greater trochanters.       LOWER EXTREMITY Left Right   Hip flexion 5/5 5/5   Knee flexion 5/5 5/5   Knee extension 5/5 5/5   Ankle dorsiflexion 5/5 5/5   Ankle plantarflexion 5/5 5/5   Great toe extension 5/5 5/5        Special tests -     Straight leg raise -negative     MARTHA -positive for lateral hip pain bilaterally     Pain with hip ROM -positive for lateral hip pain bilaterally     Seated piriformis stretch -positive for lateral hip pain bilaterally     Neurologic - Sensation intact to light touch bilaterally.  +1 patella and Achilles reflexes bilaterally.  0 beats clonus bilaterally.  Mute Babinski bilaterally      SI provocation tests:    Right Left   Stephanie Finger Test  - -   MARTHA  Lateral hip pain Lateral hip pain   Thigh Thrust: - -   Pelvic Compression Test  - -   Palpation  + +   Pelvic Gapping  - -   Gaenslen s Test  - -          Imaging:     I independently reviewed & provided my own interpretation of new radiographs and/ or advanced imaging at this clinic visit. The results were discussed with the patient.  Findings include:    ***    4/22/24 DEXA: demonstrates osteopenia  Lumbar Spine T-score 1.5 (L1-3), BMD is 1.395 g/cm2.   Left femoral neck T-score -0.7   Right femoral neck T-score -1.2   Left Total hip T-score -0.4 , BMD is 1.037 g/cm2.  Right total hip T-score -0.3, BMD is 1.051 g/cm2.  Radius (1/3 distal): T-score 0.5, BMD 0.836 g/cm2           Assessment and Plan:   Assessment:  59 year old with    Chronic axial thoracolumbar back pain  Chronic axial neck pain  Bilateral greater trochanter bursitis  Osteopenia (2024 DEXA most negative T-score -1.2)     Plan:  ***    - Physical Therapy Referral  - Ordered MRI lumbar w/o contrast if no improvement with PT.   - Follow up after MRI to review next steps, possible injections or referral to medical spine specialist     Respectfully,  Gisselle Mancilla (ruth Clayton)COLETTE  Orthopaedic Spine Surgery  Dept Orthopaedic Surgery, [facility]  [facility] [place] Phone: [phone]      *** minutes spent on the date of the encounter doing chart review, review of outside records, review of test results, my own interpretation of tests, documentation, patient history, physical exam & discussion of plan with patient and family.    Dictation Disclaimer: Some of this Note has been completed with voice-recognition dictation software. Although errors are generally corrected real-time, there is the potential for a rare error to be present in the completed chart.        Again, thank you for allowing me to participate in the care of your patient.        Sincerely,        Gisselle Mancilla PA-C    Electronically signed

## 2024-12-23 NOTE — PROGRESS NOTES
"Camacho Bhagat complains of  No chief complaint on file.      I have reviewed and updated the patient's Past Medical History, Social History, Family History and Medication List.    ALLERGIES  Erythromycin, Losartan, and Vioxx    Spine Surgery Consultation    REFERRING PHYSICIAN: No ref. provider found   PRIMARY CARE PHYSICIAN: Shay Kirkpatrick           Chief Complaint:   No chief complaint on file.      History of Present Illness:  Symptom Profile Including: location of symptoms, onset, severity, exacerbating/alleviating factors, previous treatments:        Camacho Bhagat is a 59 year old male who presents today for evaluation of neck pain, thoracic pain, lumbar pain, SI joint pain.     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  He has history of liver transplant.  Reports pain in axial neck with radiation into the shoulder areas; pain is worse in the morning when he wakes up and improves with getting moving in the day.  Reports pain due to both shoulders being \"messed up\", but denies pain radiating into the arms or fingers.  No numbness and tingling in the arms or fingers.  Has pain in thoracolumbar region, lumbar spine, bilateral lateral hips.  More recently, about 3 to 4 weeks, his mid back pain has been bothering him more.  Back pain is worse with sitting and right when he gets up in the morning.  Pain in the back is improved with standing and extending the spine.  His leg pain is localized to the bilateral lateral hips.  Pain is divided into about 80% back pain, 20% bilateral hip pain, right equal to left.  Hip pain is worse with movement.  Has history of getting bilateral greater trochanter injections which he reports was very helpful.  Also has history of intra-articular hip injections, and has been seen by Dr. Mcleod in 2020; plan at that time was to continue with non-op management. He does feel legs are getting weaker " with time; trying to walk down his long driveway he has to stop due to pain/ fatigue. Has numbness in both feet up to the level of the knees. Denies changes to bowel/bladder function. Did have DANNY in the past which helped 6-12 months. Is on various pain medications, see below.     Past treatments tried:  - Physical therapy: has not completed recently  - Injections:   2/15/24 L4-5 translaminar DANNY: per patient, not as helpful as subsequent L2-3 DANNY  6/7/23 L2-3 translaminar DANNY: per patient, helped significantly, lasted 6-12 months.   9/13/22 bilateral greater trochanter injections (Soren) - helped a lot  - Medications: can't take NSAIDs. Has PRN oxycodone 5mg, but takes very rarely. Has PRN methocarbamol. Takes 800mg gabapenin HS. Is on daily low dose prednisone      KEVIN Scores:  Oswestry (KEVIN) Questionnaire        12/17/2024    12:11 PM   OSWESTRY DISABILITY INDEX   Count 9    Sum 18    Oswestry Score (%) 40 %        Patient-reported        Neck Disability Index (NDI) Questionnaire        12/17/2024    12:15 PM   Neck Disability Index (NDI)   Neck Disability Index: Count 10   NDI: Total Score = SUM (points for all 10 findings) 11   Neck Disability in Percent = (Total Score) / 50 * 100 22 (%)        PROMIS-10 Scores:  Global Mental Health Score: (Patient-Rptd) (P) 18  Global Physical Health Score: (Patient-Rptd) (P) 13  PROMIS TOTAL - SUBSCORES: (Patient-Rptd) (P) 31         Physical Exam:    Constitutional - Patient is healthy, well-nourished and appears stated age   Respiratory - Patient is breathing normally and in no respiratory distress.   Skin - No suspicious rashes or lesions.   Psychiatric - Normal mood and affect.   Cardiovascular - Extremities warm and well perfused.   Eyes - Visual acuity is normal to the written word.   ENT - Hearing intact to the spoken word.   GI - No abdominal distention.   Musculoskeletal - Non-antalgic gait without use of assistive devices.  Able to walk on heels and toes without  significant weakness.  Full, painless flexion, extension, lateral rotation of spine.  Tender to palpation around T10-L2 midline.  Nontender to palpation paraspinal muscles, mildly tender to bilateral PSIS.  Tender to palpation bilateral greater trochanters.       LOWER EXTREMITY Left Right   Hip flexion 5/5 5/5   Knee flexion 5/5 5/5   Knee extension 5/5 5/5   Ankle dorsiflexion 5/5 5/5   Ankle plantarflexion 5/5 5/5   Great toe extension 5/5 5/5        Special tests -     Straight leg raise -negative     MARTHA -positive for lateral hip pain bilaterally     Pain with hip ROM -positive for lateral hip pain bilaterally     Seated piriformis stretch -positive for lateral hip pain bilaterally     Neurologic - Sensation intact to light touch bilaterally.  +1 patella and Achilles reflexes bilaterally.  0 beats clonus bilaterally.  Mute Babinski bilaterally      SI provocation tests:    Right Left   Stephanie Finger Test  - -   MARTHA  Lateral hip pain Lateral hip pain   Thigh Thrust: - -   Pelvic Compression Test  - -   Palpation  + +   Pelvic Gapping  - -   Gaenslen s Test  - -          Imaging:     I independently reviewed & provided my own interpretation of new radiographs and/ or advanced imaging at this clinic visit. The results were discussed with the patient.  Findings include:    ***    4/22/24 DEXA: demonstrates osteopenia  Lumbar Spine T-score 1.5 (L1-3), BMD is 1.395 g/cm2.   Left femoral neck T-score -0.7   Right femoral neck T-score -1.2   Left Total hip T-score -0.4 , BMD is 1.037 g/cm2.  Right total hip T-score -0.3, BMD is 1.051 g/cm2.  Radius (1/3 distal): T-score 0.5, BMD 0.836 g/cm2           Assessment and Plan:   Assessment:  59 year old with   Chronic axial thoracolumbar back pain  Chronic axial neck pain  Bilateral greater trochanter bursitis  Osteopenia (2024 DEXA most negative T-score -1.2)     Plan:  ***    - Physical Therapy Referral  - Ordered MRI lumbar w/o contrast if no improvement with PT.   -  Follow up after MRI to review next steps, possible injections or referral to medical spine specialist     Respectfully,  Gisselle Mancilla (ruth Clayton), PAANA  Orthopaedic Spine Surgery  Dept Orthopaedic Surgery, [facility]  [facility] [place] Phone: [phone]      *** minutes spent on the date of the encounter doing chart review, review of outside records, review of test results, my own interpretation of tests, documentation, patient history, physical exam & discussion of plan with patient and family.    Dictation Disclaimer: Some of this Note has been completed with voice-recognition dictation software. Although errors are generally corrected real-time, there is the potential for a rare error to be present in the completed chart.

## 2024-12-31 ENCOUNTER — OFFICE VISIT (OUTPATIENT)
Dept: ORTHOPEDICS | Facility: CLINIC | Age: 59
End: 2024-12-31
Payer: COMMERCIAL

## 2024-12-31 DIAGNOSIS — L60.2 LONG TOENAIL: Primary | ICD-10-CM

## 2024-12-31 DIAGNOSIS — E11.49 TYPE II OR UNSPECIFIED TYPE DIABETES MELLITUS WITH NEUROLOGICAL MANIFESTATIONS, NOT STATED AS UNCONTROLLED(250.60) (H): ICD-10-CM

## 2024-12-31 PROCEDURE — 99207 PR DROP WITH A PROCEDURE: CPT | Performed by: PODIATRIST

## 2024-12-31 PROCEDURE — 11719 TRIM NAIL(S) ANY NUMBER: CPT | Performed by: PODIATRIST

## 2024-12-31 NOTE — LETTER
12/31/2024      Camacho Bhagat  [address] [facility] [address]      Dear Colleague,    Thank you for referring your patient, Camacho Bhagat, to the [facility] [place]. Please see a copy of my visit note below.    Date of Service: 11/17/2023    Chief Complaint:   Chief Complaint   Patient presents with     Follow Up     Diabetic foot care.         HPI: Camacho is a 59 year old male who presents today for a diabetic foot eval and management. He has been diabetic for years. Has liver transplant. Notes that he is having pain in the outside of both feet. Has diabetic shoes and inserts.     Interval hx: he is doing well. He has not trimmed his nails.     Review of Systems: No n/v/d/f/c/ns/sob/cp    PMH:   Past Medical History:   Diagnosis Date     Cancer (H) 12/15    Stage 4 liver cancer     Diabetes type 2, controlled (H) 11/10/2016     Esophageal reflux      Fibromyalgia 01/2009    dx with Dr Benitez( Rheum)     Gangrene of finger (H) 08/25/2017     H/O deep venous thrombosis 11/2001    Permanent IVC filter in place.     H/O CASTAÑEDA (nonalcoholic steatohepatitis)      H/O Pneumonia, organism unspecified(486) 10/2001    Included ARDS, sepsis, and  acute renal failure; hospitalized, required tracheostomy placement.     H/O: HTN (hypertension) 11/2001    No longer prescribed antihypertensive medication.       History of hepatocellular carcinoma      History of liver transplant (H)      History of obstructive sleep apnea     No longer wears CPAP since losing approximately 200 pounds with his liver transplant and its complications.       HLD (hyperlipidemia)      Hypertension      Ischemia of both lower extremities 08/25/2017    Distal ischemia due to shock/high pressor requirements     Liver transplant rejection (H) 06/11/2018     Neutropenic colitis (H) 07/04/2017     Osteoarthritis      Presence of PERMANENT IVC filter      Rheumatoid arthritis(714.0)     remission for many years      Squamous cell skin cancer     on back       PSxH:   Past Surgical History:   Procedure Laterality Date     ARTHROSCOPY KNEE WITH MENISCAL REPAIR Right 2008    Right knee arthroscopy     BENCH LIVER N/A 03/04/2017    Procedure: BENCH LIVER;  Surgeon: Jovan Tran MD;  Location: [facility] OR     BIOPSY  5/2017    Liver     CHOLECYSTECTOMY       COLONOSCOPY N/A 07/21/2017    Procedure: COMBINED COLONOSCOPY, SINGLE OR MULTIPLE BIOPSY/POLYPECTOMY BY BIOPSY;  Colonoscopy;  Surgeon: Izaiah Montes MD;  Location: [facility] GI     COLONOSCOPY N/A 10/24/2022    Procedure: COLONOSCOPY, WITH POLYPECTOMY AND BIOPSY;  Surgeon: Abril Mcneill MD;  Location: [facility]     ENDOSCOPIC RETROGRADE CHOLANGIOPANCREATOGRAM N/A 05/22/2018    Procedure: COMBINED ENDOSCOPIC RETROGRADE CHOLANGIOPANCREATOGRAPHY, PLACE TUBE/STENT;  Endoscopic Retrograde Cholangiopancreatography with sphincterotomy and pancreatic duct stent placement;  Surgeon: Zay Gaitan MD;  Location: [facility] OR     ENT SURGERY      Repair of deviated septum     ESOPHAGOSCOPY, GASTROSCOPY, DUODENOSCOPY (EGD), COMBINED N/A 08/04/2016    Procedure: COMBINED ESOPHAGOSCOPY, GASTROSCOPY, DUODENOSCOPY (EGD), BIOPSY SINGLE OR MULTIPLE;  Surgeon: Trent Pederson MD;  Location:  GI     ESOPHAGOSCOPY, GASTROSCOPY, DUODENOSCOPY (EGD), COMBINED N/A 08/27/2017    Procedure: COMBINED ESOPHAGOSCOPY, GASTROSCOPY, DUODENOSCOPY (EGD);;  Surgeon: Los Wynn MD;  Location: [facility]     ESOPHAGOSCOPY, GASTROSCOPY, DUODENOSCOPY (EGD), COMBINED N/A 08/17/2023    Procedure: Esophagoscopy, gastroscopy, duodenoscopy (EGD), combined;  Surgeon: Trent Villanueva MD;  Location: [facility]     INCISION AND DRAINAGE ABDOMEN WASHOUT, COMBINED Right 02/14/2018    Procedure: COMBINED INCISION AND DRAINAGE ABDOMEN WASHOUT;  COMBINED INCISION AND DRAINAGE right ABDOMEN flank;  Surgeon: Sara Dinh MD;  [facility]: [facility]     LAPAROTOMY EXPLORATORY N/A 07/04/2017    Procedure: LAPAROTOMY  EXPLORATORY;  Exploratory Laparotomy, washout;  Surgeon: Tip Zhang MD;  [facility]: [facility]     LAPAROTOMY EXPLORATORY N/A 2017    Procedure: LAPAROTOMY EXPLORATORY;  Exploratory Laparotomy, Washout with closure.;  Surgeon: Tip Zhang MD;  [facility]: [facility]     LAPAROTOMY EXPLORATORY N/A 2017    Procedure: LAPAROTOMY EXPLORATORY;  Exploratory Laparotomy, Right angelique-colectomy, end ileostomy, mucosal fistula, partial omentectomy;  Surgeon: Sara Dinh MD;  Location: [facility]     LASIK       ORTHOPEDIC SURGERY Right     Repair of dislocated wrist.       PHACOEMULSIFICATION CLEAR CORNEA WITH STANDARD INTRAOCULAR LENS IMPLANT Right 2024    Procedure: RIGHT EYE PHACOEMULSIFICATION, COMPLEX CATARACT, WITH INTRAOCULAR LENS IMPLANT;  Surgeon: Stan Roque MD;  Location: [facility] OR     RELEASE TRIGGER FINGER Right 11/10/2017    Procedure: RELEASE TRIGGER FINGER;  Debride, V-Y Flap Right Index Finger;  Surgeon: Momo Noonan MD;  [facility]     TAKEDOWN ILEOSTOMY N/A 2018    Procedure: TAKEDOWN ILEOSTOMY;  Exploratory Laparotomy, Lysis of Adhesions, Takedown Of End Ileostomy, Takedown of mucocutaneous fistula, ileocolic resection  And Colorectal Anastomosis, Primary repair of Ventral Hernia, Anesthesia Block ;  Surgeon: Sara Dinh MD;  Location: [facility]     TRACHEOSTOMY  2001    During hospitalization for pneumonia.       TRANSPLANT LIVER RECIPIENT  DONOR N/A 2017    Procedure: TRANSPLANT LIVER RECIPIENT  DONOR;  Surgeon: Jovan Tran MD;  Location: [facility]       Allergies: Erythromycin, Losartan, and Vioxx    SH:   Social History     Socioeconomic History     Marital status:      Spouse name: Not on file     Number of children: 1     Years of education: Not on file     Highest education level: Not on file   Occupational History     Occupation:     Tobacco Use     Smoking status: Former      Current packs/day: 0.00     Average packs/day: 0.5 packs/day for 1 year (0.5 ttl pk-yrs)     Types: Cigarettes, Cigars, Dip, chew, snus or snuff     Start date: 1987     Quit date: 1987     Years since quittin.5     Smokeless tobacco: Former     Types: Chew     Quit date: 10/8/2015     Tobacco comments:     1 Cigar once every other month.   Substance and Sexual Activity     Alcohol use: Not Currently     Comment: No alcohol since liver transplant.       Drug use: Never     Sexual activity: Yes     Partners: Female     Birth control/protection: None   Other Topics Concern     Parent/sibling w/ CABG, MI or angioplasty before 65F 55M? 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         Social Drivers of Health     Financial Resource Strain: Low Risk  (2024)    Financial Resource Strain      Within the past 12 months, have you or your family members you live with been unable to get utilities (heat, electricity) when it was really needed?: No   Food Insecurity: Low Risk  (2024)    Food Insecurity      Within the past 12 months, did you worry that your food would run out before you got money to buy more?: No      Within the past 12 months, did the food you bought just not last and you didn t have money to get more?: No   Transportation Needs: Low Risk  (2024)    Transportation Needs      Within the past 12 months, has lack of transportation kept you from medical appointments, getting your medicines, non-medical meetings or appointments, work, or from getting things that you need?: No   Physical Activity: Not on file   Stress: Not on file   Social Connections: Not on file   Interpersonal Safety: Low Risk  (2024)    Interpersonal Safety      Do you feel physically and emotionally safe where you currently live?: Yes      Within the past 12 months, have you been hit, slapped, kicked or otherwise physically hurt by someone?: No      Within the past 12  months, have you been humiliated or emotionally abused in other ways by your partner or ex-partner?: No   Housing Stability: Low Risk  (8/8/2024)    Housing Stability      Do you have housing? : Yes      Are you worried about losing your housing?: No       FH:   Family History   Problem Relation Age of Onset     Arthritis Mother      Thyroid Cancer Mother         Survivor!     Thyroid Disease Mother         Thyroid cancer     Diabetes Father      Substance Abuse Father      Cervical Cancer Maternal Grandmother      Skin Cancer Maternal Grandfather      Cerebrovascular Disease Maternal Grandfather      No Known Problems Paternal Grandmother      Prostate Cancer Paternal Grandfather      Colon Cancer No family hx of      Hyperlipidemia No family hx of      Coronary Artery Disease No family hx of      Breast Cancer No family hx of      Glaucoma No family hx of      Hypertension No family hx of      Macular Degeneration No family hx of        Objective:  Lab Results   Component Value Date    A1C 6.2 07/05/2024    A1C 5.8 03/01/2024    A1C 6.2 11/30/2023    A1C 5.7 07/31/2023    A1C 6.0 09/27/2022    A1C 6.3 03/31/2021    A1C 6.7 02/12/2021    A1C 6.4 11/24/2020    A1C 6.2 10/06/2020    A1C 5.5 04/11/2018         PT and DP pulses are 2/4 bilaterally. CRT is instant. Diminished pedal hair.   Gross sensation is diminished bilaterally. Protective sensation is diminished as demonstrated with a 5.07G monofilament.   Equinus is noted bilaterally. No pain with active or passive ROM of the ankle, MTJ, 1st ray, or halluces bilaterally,. When standing on the inserts, he is being rolled outward, onto the 5th met heads.   Nails elongated bilaterally.  No open lesions are noted.     Assessment:   Encounter Diagnoses   Name Primary?     Long toenail Yes     Type II or unspecified type diabetes mellitus with neurological manifestations, not stated as uncontrolled(250.60) (H)                Plan:  - Pt seen and evaluated  - Nails  trimmed x 10.   - Moisturize daily.  - See again in 3 months.            Again, thank you for allowing me to participate in the care of your patient.        Sincerely,        Leo Joshi DPM    Electronically signed

## 2025-01-02 NOTE — PROGRESS NOTES
Date of Service: 11/17/2023    Chief Complaint:   Chief Complaint   Patient presents with    Follow Up     Diabetic foot care.         HPI: Camacho is a 59 year old male who presents today for a diabetic foot eval and management. He has been diabetic for years. Has liver transplant. Notes that he is having pain in the outside of both feet. Has diabetic shoes and inserts.     Interval hx: he is doing well. He has not trimmed his nails.     Review of Systems: No n/v/d/f/c/ns/sob/cp    PMH:   Past Medical History:   Diagnosis Date    Cancer (H) 12/15    Stage 4 liver cancer    Diabetes type 2, controlled (H) 11/10/2016    Esophageal reflux     Fibromyalgia 01/2009    dx with Dr Benitez( Rheum)    Gangrene of finger (H) 08/25/2017    H/O deep venous thrombosis 11/2001    Permanent IVC filter in place.    H/O CASTAÑEDA (nonalcoholic steatohepatitis)     H/O Pneumonia, organism unspecified(486) 10/2001    Included ARDS, sepsis, and  acute renal failure; hospitalized, required tracheostomy placement.    H/O: HTN (hypertension) 11/2001    No longer prescribed antihypertensive medication.      History of hepatocellular carcinoma     History of liver transplant (H)     History of obstructive sleep apnea     No longer wears CPAP since losing approximately 200 pounds with his liver transplant and its complications.      HLD (hyperlipidemia)     Hypertension     Ischemia of both lower extremities 08/25/2017    Distal ischemia due to shock/high pressor requirements    Liver transplant rejection (H) 06/11/2018    Neutropenic colitis (H) 07/04/2017    Osteoarthritis     Presence of PERMANENT IVC filter     Rheumatoid arthritis(714.0)     remission for many years    Squamous cell skin cancer     on back       PSxH:   Past Surgical History:   Procedure Laterality Date    ARTHROSCOPY KNEE WITH MENISCAL REPAIR Right 2008    Right knee arthroscopy    BENCH LIVER N/A 03/04/2017    Procedure: BENCH LIVER;  Surgeon: Jovan Tran MD;   Location: [facility]    BIOPSY  5/2017    Liver    CHOLECYSTECTOMY      COLONOSCOPY N/A 07/21/2017    Procedure: COMBINED COLONOSCOPY, SINGLE OR MULTIPLE BIOPSY/POLYPECTOMY BY BIOPSY;  Colonoscopy;  Surgeon: Izaiah Montes MD;  Location: [facility]    COLONOSCOPY N/A 10/24/2022    Procedure: COLONOSCOPY, WITH POLYPECTOMY AND BIOPSY;  Surgeon: Abril Mcneill MD;  Location: [facility]    ENDOSCOPIC RETROGRADE CHOLANGIOPANCREATOGRAM N/A 05/22/2018    Procedure: COMBINED ENDOSCOPIC RETROGRADE CHOLANGIOPANCREATOGRAPHY, PLACE TUBE/STENT;  Endoscopic Retrograde Cholangiopancreatography with sphincterotomy and pancreatic duct stent placement;  Surgeon: Zay Gaitan MD;  Location: [facility]    ENT SURGERY      Repair of deviated septum    ESOPHAGOSCOPY, GASTROSCOPY, DUODENOSCOPY (EGD), COMBINED N/A 08/04/2016    Procedure: COMBINED ESOPHAGOSCOPY, GASTROSCOPY, DUODENOSCOPY (EGD), BIOPSY SINGLE OR MULTIPLE;  Surgeon: Trent Pederson MD;  Location:  GI    ESOPHAGOSCOPY, GASTROSCOPY, DUODENOSCOPY (EGD), COMBINED N/A 08/27/2017    Procedure: COMBINED ESOPHAGOSCOPY, GASTROSCOPY, DUODENOSCOPY (EGD);;  Surgeon: Los Wynn MD;  [facility]    ESOPHAGOSCOPY, GASTROSCOPY, DUODENOSCOPY (EGD), COMBINED N/A 08/17/2023    Procedure: Esophagoscopy, gastroscopy, duodenoscopy (EGD), combined;  Surgeon: Trent Villanueva MD;  Location: [facility]    INCISION AND DRAINAGE ABDOMEN WASHOUT, COMBINED Right 02/14/2018    Procedure: COMBINED INCISION AND DRAINAGE ABDOMEN WASHOUT;  COMBINED INCISION AND DRAINAGE right ABDOMEN flank;  Surgeon: Sara Dinh MD;  [facility]    LAPAROTOMY EXPLORATORY N/A 07/04/2017    Procedure: LAPAROTOMY EXPLORATORY;  Exploratory Laparotomy, washout;  Surgeon: Tip Zhang MD;  [facility]    LAPAROTOMY EXPLORATORY N/A 07/05/2017    Procedure: LAPAROTOMY EXPLORATORY;  Exploratory Laparotomy, Washout with closure.;  Surgeon: Tip Zhang MD;  [facility]     LAPAROTOMY EXPLORATORY N/A 2017    Procedure: LAPAROTOMY EXPLORATORY;  Exploratory Laparotomy, Right angelique-colectomy, end ileostomy, mucosal fistula, partial omentectomy;  Surgeon: Sara Dinh MD;  [facility]    LASIK      ORTHOPEDIC SURGERY Right     Repair of dislocated wrist.      PHACOEMULSIFICATION CLEAR CORNEA WITH STANDARD INTRAOCULAR LENS IMPLANT Right 2024    Procedure: RIGHT EYE PHACOEMULSIFICATION, COMPLEX CATARACT, WITH INTRAOCULAR LENS IMPLANT;  Surgeon: Stan Roque MD;  Location: [facility]    RELEASE TRIGGER FINGER Right 11/10/2017    Procedure: RELEASE TRIGGER FINGER;  Debride, V-Y Flap Right Index Finger;  Surgeon: Momo Noonan MD;  [facility]    TAKEDOWN ILEOSTOMY N/A 2018    Procedure: TAKEDOWN ILEOSTOMY;  Exploratory Laparotomy, Lysis of Adhesions, Takedown Of End Ileostomy, Takedown of mucocutaneous fistula, ileocolic resection  And Colorectal Anastomosis, Primary repair of Ventral Hernia, Anesthesia Block ;  Surgeon: Sara Dinh MD;  [facility]    TRACHEOSTOMY  2001    During hospitalization for pneumonia.      TRANSPLANT LIVER RECIPIENT  DONOR N/A 2017    Procedure: TRANSPLANT LIVER RECIPIENT  DONOR;  Surgeon: Jovan Tran MD;  [facility]       Allergies: Erythromycin, Losartan, and Vioxx    SH:   Social History     Socioeconomic History    Marital status:      Spouse name: Not on file    Number of children: 1    Years of education: Not on file    Highest education level: Not on file   Occupational History    Occupation:     Tobacco Use    Smoking status: Former     Current packs/day: 0.00     Average packs/day: 0.5 packs/day for 1 year (0.5 ttl pk-yrs)     Types: Cigarettes, Cigars, Dip, chew, snus or snuff     Start date: 1987     Quit date: 1987     Years since quittin.5    Smokeless tobacco: Former     Types: Chew     Quit date:  10/8/2015    Tobacco comments:     1 Cigar once every other month.   Substance and Sexual Activity    Alcohol use: Not Currently     Comment: No alcohol since liver transplant.      Drug use: Never    Sexual activity: Yes     Partners: Female     Birth control/protection: None   Other Topics Concern    Parent/sibling w/ CABG, MI or angioplasty before 65F 55M? 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         Social Drivers of Health     Financial Resource Strain: Low Risk  (8/8/2024)    Financial Resource Strain     Within the past 12 months, have you or your family members you live with been unable to get utilities (heat, electricity) when it was really needed?: No   Food Insecurity: Low Risk  (8/8/2024)    Food Insecurity     Within the past 12 months, did you worry that your food would run out before you got money to buy more?: No     Within the past 12 months, did the food you bought just not last and you didn t have money to get more?: No   Transportation Needs: Low Risk  (8/8/2024)    Transportation Needs     Within the past 12 months, has lack of transportation kept you from medical appointments, getting your medicines, non-medical meetings or appointments, work, or from getting things that you need?: No   Physical Activity: Not on file   Stress: Not on file   Social Connections: Not on file   Interpersonal Safety: Low Risk  (1/24/2024)    Interpersonal Safety     Do you feel physically and emotionally safe where you currently live?: Yes     Within the past 12 months, have you been hit, slapped, kicked or otherwise physically hurt by someone?: No     Within the past 12 months, have you been humiliated or emotionally abused in other ways by your partner or ex-partner?: No   Housing Stability: Low Risk  (8/8/2024)    Housing Stability     Do you have housing? : Yes     Are you worried about losing your housing?: No       FH:   Family History   Problem Relation Age of  Onset    Arthritis Mother     Thyroid Cancer Mother         Survivor!    Thyroid Disease Mother         Thyroid cancer    Diabetes Father     Substance Abuse Father     Cervical Cancer Maternal Grandmother     Skin Cancer Maternal Grandfather     Cerebrovascular Disease Maternal Grandfather     No Known Problems Paternal Grandmother     Prostate Cancer Paternal Grandfather     Colon Cancer No family hx of     Hyperlipidemia No family hx of     Coronary Artery Disease No family hx of     Breast Cancer No family hx of     Glaucoma No family hx of     Hypertension No family hx of     Macular Degeneration No family hx of        Objective:  Lab Results   Component Value Date    A1C 6.2 07/05/2024    A1C 5.8 03/01/2024    A1C 6.2 11/30/2023    A1C 5.7 07/31/2023    A1C 6.0 09/27/2022    A1C 6.3 03/31/2021    A1C 6.7 02/12/2021    A1C 6.4 11/24/2020    A1C 6.2 10/06/2020    A1C 5.5 04/11/2018         PT and DP pulses are 2/4 bilaterally. CRT is instant. Diminished pedal hair.   Gross sensation is diminished bilaterally. Protective sensation is diminished as demonstrated with a 5.07G monofilament.   Equinus is noted bilaterally. No pain with active or passive ROM of the ankle, MTJ, 1st ray, or halluces bilaterally,. When standing on the inserts, he is being rolled outward, onto the 5th met heads.   Nails elongated bilaterally.  No open lesions are noted.     Assessment:   Encounter Diagnoses   Name Primary?    Long toenail Yes    Type II or unspecified type diabetes mellitus with neurological manifestations, not stated as uncontrolled(250.60) (H)                Plan:  - Pt seen and evaluated  - Nails trimmed x 10.   - Moisturize daily.  - See again in 3 months.

## 2025-01-03 ENCOUNTER — MYC MEDICAL ADVICE (OUTPATIENT)
Dept: INTERNAL MEDICINE | Facility: CLINIC | Age: 60
End: 2025-01-03
Payer: COMMERCIAL

## 2025-01-08 DIAGNOSIS — I10 BENIGN ESSENTIAL HYPERTENSION: ICD-10-CM

## 2025-01-08 RX ORDER — DOXAZOSIN 8 MG/1
8 TABLET ORAL AT BEDTIME
Qty: 90 TABLET | Refills: 3 | Status: SHIPPED | OUTPATIENT
Start: 2025-01-08

## 2025-01-14 ENCOUNTER — MYC REFILL (OUTPATIENT)
Dept: INTERNAL MEDICINE | Facility: CLINIC | Age: 60
End: 2025-01-14
Payer: COMMERCIAL

## 2025-01-14 DIAGNOSIS — I10 ESSENTIAL HYPERTENSION: ICD-10-CM

## 2025-01-14 RX ORDER — METOPROLOL SUCCINATE 200 MG/1
200 TABLET, EXTENDED RELEASE ORAL DAILY
Qty: 90 TABLET | Refills: 1 | Status: SHIPPED | OUTPATIENT
Start: 2025-01-14

## 2025-01-16 NOTE — TELEPHONE ENCOUNTER
Left Voicemail (1st Attempt) for the patient to call back and schedule the following:    Appointment type: Return   Provider: PCP  Return date: Reschedule 2/17   Specialty phone number: [phone]  Additonal Notes: 2/17 provider not in clinic

## 2025-01-24 ENCOUNTER — PATIENT OUTREACH (OUTPATIENT)
Dept: NEPHROLOGY | Facility: CLINIC | Age: 60
End: 2025-01-24
Payer: COMMERCIAL

## 2025-01-24 DIAGNOSIS — I10 HTN (HYPERTENSION): ICD-10-CM

## 2025-01-26 ENCOUNTER — HEALTH MAINTENANCE LETTER (OUTPATIENT)
Age: 60
End: 2025-01-26

## 2025-01-29 NOTE — PROGRESS NOTES
Nephrology Note: Blood Pressure Check    REASON FOR CALL:                                                      REASON FOR CALL: No chief complaint on file.                                       SITUATION/BACKROUND:                                                    Patient is being treated for HTN.        ASSESSMENT:                                                    [facility] message sent out to patient. Patient called and left message stating that blood pressures have stable but notes that blood pressure have been 140/150s. Fells that hydralazine worked when he first started now it isn't doing anything.   He is currently taking   Losartan 25 mg   Furosemide 40 mg   Amlodipine 10 mg   Hydralazine 25 mg BID   Metoprolol 200 mg   Doxazosin 8 mg   Message sent to provider on next step.   ----  1/30/25-Per provider increase Hydralazine to 50 mg bid.      Neph Assessments:  Recent Labs      Latest Ref Rng & Units 11/1/2024 9/16/2024 7/8/2024   Neph Labs   Sodium 135 - 145 mmol/L 137  136  141    GFR Estimate >60 mL/min/1.73m2 39  37  34    Potassium 3.4 - 5.3 mmol/L 5.1  4.3  4.7    Creatinine 0.67 - 1.17 mg/dL 1.94  2.04  2.18    Urea Nitrogen 8.0 - 23.0 mg/dL 32.9  36.8  45.2    Hemoglobin 13.3 - 17.7 g/dL 10.7  10.7  11.4    Ferritin 31 - 409 ng/mL 604      Iron Binding Cap 240 - 430 ug/dL 214      Parathyroid Hormone Intact 15 - 65 pg/mL 73      Iron 61 - 157 ug/dL 80          PLAN:                                                        Follow Up:   Patient to follow up as scheduled at next appointment     Patient verbalized understanding and will follow up as recommended.    Janel Reyes RN

## 2025-01-30 RX ORDER — HYDRALAZINE HYDROCHLORIDE 50 MG/1
50 TABLET, FILM COATED ORAL 2 TIMES DAILY
Qty: 180 TABLET | Refills: 3 | Status: SHIPPED | OUTPATIENT
Start: 2025-01-30

## 2025-01-30 NOTE — PROGRESS NOTES
Reviewed chart today for continue ostomy teaching. He is confused again and removed his ileostomy pouch which was replaced by RN early this morning. His wife demonstrated pouch replacement and mom has observed the procedure previously. Pt most likely DC to TCU. Discharge orders written and placed under DC instruction. WOC will f/u Friday for sacral pressure injury and pouch change.   declines

## 2025-02-03 ENCOUNTER — OFFICE VISIT (OUTPATIENT)
Dept: ORTHOPEDICS | Facility: CLINIC | Age: 60
End: 2025-02-03
Attending: PHYSICIAN ASSISTANT
Payer: COMMERCIAL

## 2025-02-03 DIAGNOSIS — M70.62 GREATER TROCHANTERIC BURSITIS OF BOTH HIPS: ICD-10-CM

## 2025-02-03 DIAGNOSIS — M70.61 GREATER TROCHANTERIC BURSITIS OF BOTH HIPS: ICD-10-CM

## 2025-02-03 PROCEDURE — 20550 NJX 1 TENDON SHEATH/LIGAMENT: CPT | Mod: 50 | Performed by: FAMILY MEDICINE

## 2025-02-03 PROCEDURE — 76942 ECHO GUIDE FOR BIOPSY: CPT | Mod: RT | Performed by: FAMILY MEDICINE

## 2025-02-03 RX ORDER — TRIAMCINOLONE ACETONIDE 40 MG/ML
40 INJECTION, SUSPENSION INTRA-ARTICULAR; INTRAMUSCULAR
Status: COMPLETED | OUTPATIENT
Start: 2025-02-03 | End: 2025-02-03

## 2025-02-03 RX ORDER — LIDOCAINE HYDROCHLORIDE 10 MG/ML
4 INJECTION, SOLUTION EPIDURAL; INFILTRATION; INTRACAUDAL; PERINEURAL
Status: COMPLETED | OUTPATIENT
Start: 2025-02-03 | End: 2025-02-03

## 2025-02-03 RX ADMIN — TRIAMCINOLONE ACETONIDE 40 MG: 40 INJECTION, SUSPENSION INTRA-ARTICULAR; INTRAMUSCULAR at 13:20

## 2025-02-03 RX ADMIN — LIDOCAINE HYDROCHLORIDE 4 ML: 10 INJECTION, SOLUTION EPIDURAL; INFILTRATION; INTRACAUDAL; PERINEURAL at 13:20

## 2025-02-03 NOTE — LETTER
2/3/2025      RE: Camacho Bhagat  [address] [facility] [address]     Dear Colleague,    Thank you for referring your patient, Camacho Bhagat, to the [facility] [place]. Please see a copy of my visit note below.    [facility]  SPORTS & ORTHOPEDIC CLINIC VISIT     Feb 3, 2025      USG bilateral Gluteal Tendinopathy Injections at the greater trochanter    Date/Time: 2/3/2025 1:20 PM    Performed by: Esequiel Stinson MD  Authorized by: Esequiel Stinson MD    Indications:  Pain  Needle Size:  22 G  Guidance: ultrasound    Approach:  Lateral  Location:  Hip  Laterality:  Bilateral      Site:  Bilateral greater trochanteric bursa  Medications (Right):  40 mg triamcinolone 40 MG/ML; 4 mL lidocaine (PF) 1 %  Medications (Left):  40 mg triamcinolone 40 MG/ML; 4 mL lidocaine (PF) 1 %  Outcome:  Tolerated well, no immediate complications  Procedure discussed: discussed risks, benefits, and alternatives    Consent Given by:  Patient  Timeout: timeout called immediately prior to procedure    Prep: patient was prepped and draped in usual sterile fashion     Patient was positioned lying in the lateral recumbent position on his right on exam table. Utilizing ultrasound, the left gluteus medius and minimus insertion on the greater trochanter was visualized.  Ultrasound was utilized to ensure safety of injection clear of neurovascular structures. Next the area was cleaned with a chlorhexadine swab. The skin was anesthetized with ethyl chloride spray. Using sterile technique, and continuous ultrasound visualization, a 22g spinal needle was introduced and advanced to the level of the trochanter under direct and continuous ultrasound guidance. A solution of 4mL 1% lidocaine and 40mg triamcinolone was injected and seen flowing around the tendons. Images were captured and saved to the patient's permanent record. Next the identical procedure was repeated  on the right side. Patient tolerated the procedure well. No immediate complications. Routine postinjection instructions were given. Follow up promptly if significant increase in pain, warmth, redness from the injection site.     Esequiel Stinson MD              Again, thank you for allowing me to participate in the care of your patient.      Sincerely,    Esequiel Stinson MD

## 2025-02-03 NOTE — PROGRESS NOTES
[facility]  SPORTS & ORTHOPEDIC CLINIC VISIT     Feb 3, 2025      USG bilateral Gluteal Tendinopathy Injections at the greater trochanter    Date/Time: 2/3/2025 1:20 PM    Performed by: Esequiel Stinson MD  Authorized by: Esequiel Stinson MD    Indications:  Pain  Needle Size:  22 G  Guidance: ultrasound    Approach:  Lateral  Location:  Hip  Laterality:  Bilateral      Site:  Bilateral greater trochanteric bursa  Medications (Right):  40 mg triamcinolone 40 MG/ML; 4 mL lidocaine (PF) 1 %  Medications (Left):  40 mg triamcinolone 40 MG/ML; 4 mL lidocaine (PF) 1 %  Outcome:  Tolerated well, no immediate complications  Procedure discussed: discussed risks, benefits, and alternatives    Consent Given by:  Patient  Timeout: timeout called immediately prior to procedure    Prep: patient was prepped and draped in usual sterile fashion     Patient was positioned lying in the lateral recumbent position on his right on exam table. Utilizing ultrasound, the left gluteus medius and minimus insertion on the greater trochanter was visualized.  Ultrasound was utilized to ensure safety of injection clear of neurovascular structures. Next the area was cleaned with a chlorhexadine swab. The skin was anesthetized with ethyl chloride spray. Using sterile technique, and continuous ultrasound visualization, a 22g spinal needle was introduced and advanced to the level of the trochanter under direct and continuous ultrasound guidance. A solution of 4mL 1% lidocaine and 40mg triamcinolone was injected and seen flowing around the tendons. Images were captured and saved to the patient's permanent record. Next the identical procedure was repeated on the right side. Patient tolerated the procedure well. No immediate complications. Routine postinjection instructions were given. Follow up promptly if significant increase in pain, warmth, redness from the injection site.     Esequiel Stinson MD

## 2025-02-03 NOTE — NURSING NOTE
[address] [address]  Dept: [phone]  ______________________________________________________________________________    Patient: Camacho Bhagat   : 1965   MRN: [id number]   February 3, 2025    INVASIVE PROCEDURE SAFETY CHECKLIST    Date: February 3, 2025   Procedure:Bilateral greater troch USG CSI   Patient Name: Camacho Bhagat  MRN: [id number]  YOB: 1965    Action: Complete sections as appropriate. Any discrepancy results in a HARD COPY until resolved.     PRE PROCEDURE:  Patient ID verified with 2 identifiers (name and  or MRN): Yes  Procedure and site verified with patient/designee (when able): Yes  Accurate consent documentation in medical record: Yes  H&P (or appropriate assessment) documented in medical record: Yes  H&P must be up to 20 days prior to procedure and updates within 24 hours of procedure as applicable: Yes  Relevant diagnostic and radiology test results appropriately labeled and displayed as applicable: Yes  Procedure site(s) marked with provider initials: NA    TIMEOUT:  Time-Out performed immediately prior to starting procedure, including verbal and active participation of all team members addressing the following:Yes  * Correct patient identify  * Confirmed that the correct side and site are marked  * An accurate procedure consent form  * Agreement on the procedure to be done  * Correct patient position  * Relevant images and results are properly labeled and appropriately displayed  * The need to administer antibiotics or fluids for irrigation purposes during the procedure as applicable   * Safety precautions based on patient history or medication use    DURING PROCEDURE: Verification of correct person, site, and procedures any time the responsibility for care of the patient is transferred to another member of the care team.       Prior to injection, verified patient identity using patient's name and  date of birth.  Due to injection administration, patient instructed to remain in clinic for 15 minutes  afterwards, and to report any adverse reaction to me immediately.    Joint injection was performed.      Drug Amount Wasted:  None.  Vial/Syringe: Single dose vial  Expiration Date:  9/30/26 7/30/28    Sherly Blanco ATC  February 3, 2025

## 2025-02-05 ENCOUNTER — THERAPY VISIT (OUTPATIENT)
Dept: PHYSICAL THERAPY | Facility: CLINIC | Age: 60
End: 2025-02-05
Attending: PHYSICIAN ASSISTANT
Payer: COMMERCIAL

## 2025-02-05 DIAGNOSIS — M70.61 GREATER TROCHANTERIC BURSITIS OF BOTH HIPS: ICD-10-CM

## 2025-02-05 DIAGNOSIS — G89.29 CHRONIC MIDLINE LOW BACK PAIN WITHOUT SCIATICA: ICD-10-CM

## 2025-02-05 DIAGNOSIS — M54.50 CHRONIC MIDLINE LOW BACK PAIN WITHOUT SCIATICA: ICD-10-CM

## 2025-02-05 DIAGNOSIS — M70.62 GREATER TROCHANTERIC BURSITIS OF BOTH HIPS: ICD-10-CM

## 2025-02-05 DIAGNOSIS — M47.816 LUMBAR SPONDYLOSIS: ICD-10-CM

## 2025-02-05 PROCEDURE — 97110 THERAPEUTIC EXERCISES: CPT | Mod: GP

## 2025-02-05 PROCEDURE — 97162 PT EVAL MOD COMPLEX 30 MIN: CPT | Mod: GP

## 2025-02-05 NOTE — PROGRESS NOTES
PHYSICAL THERAPY EVALUATION  Type of Visit: Evaluation       Fall Risk Screen:  Fall screen completed by: PT  Have you fallen 2 or more times in the past year?: No  Have you fallen and had an injury in the past year?: No  Is patient a fall risk?: No    Pt reports he had injection on 2/3 and got relief from pain in hips. Pt reports previous back injection in 2022 or 2023. Got relief after injection for 7-8 months. Pt states he feels good for about an hour after waking up. After 3-4 hours later starts getting worse.     Pt notes numbness from knees down due to diabetes.     Employment:    Medical assistant  Hobbies/Interests:  Activity level:   AGNIESZKA:       Patient goals for therapy:  walking, doing yard work without extreme pain, daily living     Pain assessment:  Location: bilateral shoulders radiating from neck, B hips and low back   Quality: muscle achies in mid and neck, stiffness in lower back   Worse with: sitting, in morning, worse as day goes on   Better with: standing, extending spine     Sleep Quality: No issues     History of falls: No     Relevant PMH:  Numbness in both feet up to knees, osteoporosis, RA, partial great toe amputation on R     Subjective         Presenting condition or subjective complaint: Back, neck and hip pain  Date of onset: 02/05/25    Relevant medical history: Arthritis; Diabetes; Fibromyalgia; High blood pressure; Osteoarthritis; Overweight; Pain at night or rest; Significant weakness; Sleep disorder like apnea; Vision problems   Dates & types of surgery: Liver trasplant, 2 feet colon removed, partial finger and toe amputation    Prior diagnostic imaging/testing results: X-ray       Impression:  1. No substantial coronal curvature of the spine.    2. No  global sagittal or global imbalance.  3. Weight bearing axis as detailed above.  Prior therapy history for the same diagnosis, illness or injury: No      Living Environment  Social support: Alone   Type of home: House   Stairs to  enter the home: Yes 7 Is there a railing: Yes     Ramp: No   Stairs inside the home: Yes 7 Is there a railing: Yes     Help at home: None  Equipment owned:       Employment: Yes Medical assistant  Hobbies/Interests: Anything outdoors    Patient goals for therapy: Everythig that used to do    Pain assessment: see above     Objective     INTEGUMENTARY (edema, incisions): WFL    PALPATION: NT    POSTURE: Sitting Posture: Rounded shoulders, Forward head, Thoracic kyphosis increased    GAIT:   Weightbearing Status: WBAT  Assistive Device(s): None  Gait Deviations: WFL    RANGE OF MOTION:     (Degrees) AROM PROM    Left Right Left Right   Hip Flexion  Painful, 90 deg  WNL     Hip Extension        Hip Abduction       Hip Adduction        Hip Internal Rotation Painful, 50%  WNL     Hip External Rotation Painful, 50%  WNL         SPECIAL TESTS:    LUMBAR/ HIP SPECIAL TESTS:    Left Right   MARTHA Negative  Negative    FADIR/Labrum/YANET Positive Negative    Femoral Nerve     Katrina's     Piriformis     Quadrant Testing     SLR Positive Negative    Slump     Stork with Extension     Jameson            MYOTOMES:    Left Right   T12-L3 (Hip Flexion) 4- 4-   L2-4 (Quads)  4- 3+   L4 (Ankle DF) 5 3+   L5 (Great Toe Ext) 5 3+   S1 (Toe Raise)       Assessment & Plan   CLINICAL IMPRESSIONS  Medical Diagnosis: Chronic midline low back pain without sciatica  Greater trochanteric bursitis of both hips  Lumbar spondylosis    Treatment Diagnosis: back pain with strength and ROM deficits   Impression/Assessment: Patient is a 59 year old male with complaints of back pain.  The following significant findings have been identified: Pain, Decreased ROM/flexibility, Decreased joint mobility, Decreased strength, Impaired muscle performance, Decreased activity tolerance, and Impaired posture. These impairments interfere with their ability to perform self care tasks, work tasks, recreational activities, household mobility, and community mobility as  compared to previous level of function.     Clinical Decision Making (Complexity):  Clinical Presentation: Evolving/Changing  Clinical Presentation Rationale: based on medical and personal factors listed in PT evaluation  Clinical Decision Making (Complexity): Moderate complexity    PLAN OF CARE  Treatment Interventions:  Interventions: Gait Training, Manual Therapy, Neuromuscular Re-education, Therapeutic Activity, Therapeutic Exercise    Long Term Goals     PT Goal 1  Goal Identifier: Walk  Goal Description: Pt will walk for 1 mile without provocation of pain in order to return to PLOF.  Goal Progress: progressing  Target Date: 05/05/25  PT Goal 2  Goal Identifier: Yardwork  Goal Description: Pt will shovel sidewalk or complete other yard chores with no greater than 2/10 pain in order to return to PLOF and complete functional chores around home.  Goal Progress: progressing  Target Date: 05/05/25      Frequency of Treatment: 1x a week  Duration of Treatment: 90 days    Education Assessment:   Learner/Method: Patient  Education Comments: Pt ammenabe to tx    Risks and benefits of evaluation/treatment have been explained.   Patient/Family/caregiver agrees with Plan of Care.     Evaluation Time:     PT Eval, Moderate Complexity Minutes (53466): 15     Signing Clinician: Vicenta Victor, PT

## 2025-02-07 ENCOUNTER — TELEPHONE (OUTPATIENT)
Dept: ENDOCRINOLOGY | Facility: CLINIC | Age: 60
End: 2025-02-07
Payer: COMMERCIAL

## 2025-02-07 DIAGNOSIS — E11.42 TYPE 2 DIABETES MELLITUS WITH DIABETIC POLYNEUROPATHY, WITH LONG-TERM CURRENT USE OF INSULIN (H): Primary | ICD-10-CM

## 2025-02-07 DIAGNOSIS — Z79.4 TYPE 2 DIABETES MELLITUS WITH DIABETIC POLYNEUROPATHY, WITH LONG-TERM CURRENT USE OF INSULIN (H): Primary | ICD-10-CM

## 2025-02-07 NOTE — TELEPHONE ENCOUNTER
[facility] Prior Authorization Team Request    Medication: Humalog 200 U/ML Kwikpen  Dosing: Used in the insulin pump , total daily dose up to 170 units  Qty: 48  Day Supply: 60  NDC (required for Medicaid members): [address]-08     Insurance   BIN: [id number]  PCN: SHARI  Grp: [id number]  ID: [id number]    CoverMyMeds Key (if applicable):     Additional documentation: need urgent PA, Thanks      Filling Pharmacy: [facility]  Phone Number: [phone]  Contact:    Pharmacy NPI (required for Medicaid members):  [id number]

## 2025-02-07 NOTE — TELEPHONE ENCOUNTER
PA Initiation    Medication: HUMALOG KWIKPEN 200 UNIT/ML SC SOPN  Insurance Company: [employer] - Phone [phone] Fax [phone]  Pharmacy Filling the Rx: [facility] [place], [place] - [address] [place] [address] 1-764  Filling Pharmacy Phone: [phone]  Filling Pharmacy Fax:    Start Date: 2/7/2025  Retail Pharmacy Prior Authorization Team   Phone: [phone]

## 2025-02-10 ENCOUNTER — TELEPHONE (OUTPATIENT)
Dept: ENDOCRINOLOGY | Facility: CLINIC | Age: 60
End: 2025-02-10
Payer: COMMERCIAL

## 2025-02-10 ENCOUNTER — MYC MEDICAL ADVICE (OUTPATIENT)
Dept: ENDOCRINOLOGY | Facility: CLINIC | Age: 60
End: 2025-02-10
Payer: COMMERCIAL

## 2025-02-10 DIAGNOSIS — E11.42 TYPE 2 DIABETES MELLITUS WITH DIABETIC POLYNEUROPATHY, WITH LONG-TERM CURRENT USE OF INSULIN (H): Primary | ICD-10-CM

## 2025-02-10 DIAGNOSIS — Z79.4 TYPE 2 DIABETES MELLITUS WITH DIABETIC POLYNEUROPATHY, WITH LONG-TERM CURRENT USE OF INSULIN (H): Primary | ICD-10-CM

## 2025-02-10 RX ORDER — INSULIN LISPRO-AABC 200 [IU]/ML
1 INJECTION, SOLUTION SUBCUTANEOUS SEE ADMIN INSTRUCTIONS
Qty: 170 ML | Refills: 3 | Status: SHIPPED | OUTPATIENT
Start: 2025-02-10

## 2025-02-10 RX ORDER — INSULIN ASPART 100 [IU]/ML
INJECTION, SOLUTION INTRAVENOUS; SUBCUTANEOUS
Qty: 180 ML | Refills: 3 | Status: SHIPPED | OUTPATIENT
Start: 2025-02-10 | End: 2025-02-10

## 2025-02-10 NOTE — TELEPHONE ENCOUNTER
PRIOR AUTHORIZATION DENIED    Medication: HUMALOG KWIKPEN 200 UNIT/ML SC SOPN  Insurance Company: [employer] - Phone [phone] Fax [phone]  Denial Date: 2/8/2025  Denial Reason(s):     Appeal Information:    Patient Notified: The clinic should notify the patient of the denial.

## 2025-02-10 NOTE — TELEPHONE ENCOUNTER
[facility] Prior Authorization Team Request    Medication:  LYUMJEV KWIKPEN 200UNIT/ML SOPN  Dosing: USE VIA INSULIN PUMP; APPROXIMATELY USES 170 UNITS DAILY.  NDC (required for Medicaid members): [address]-29  Insurance Company: [employer]  BIN: [id number]  PCN: [id number]  Grp:   ID: [id number]  CoverMyMeds Key (if applicable):   Additional documentation:   Pharmacy Filling the Rx:  [facility]  Filling Pharmacy Phone:  [phone]  Contact: [facility] PA (P [id number]) please send all responses to this pool.  Pharmacy NPI (required for Medicaid members): [id number]

## 2025-02-10 NOTE — TELEPHONE ENCOUNTER
PA Initiation    Medication: LYUMJEV KWIKPEN 200 UNIT/ML SC SOPN  Insurance Company: [employer] - Phone [phone] Fax [phone]  Pharmacy Filling the Rx: [facility] [place], [place] - [address] [place] [address] [phone]  Filling Pharmacy Phone: [phone]  Filling Pharmacy Fax: [phone]  Start Date: 2/10/2025

## 2025-02-11 NOTE — TELEPHONE ENCOUNTER
Prior Authorization Approval    Medication: LYUMJEV KWIKPEN 200 UNIT/ML SC SOPN  Authorization Effective Date: 2/10/2025  Authorization Expiration Date: 2/9/2026  Approved Dose/Quantity:   Reference #:     Insurance Company: [employer] [phone] Fax [phone]  Expected CoPay: $    CoPay Card Available:      Financial Assistance Needed:   Which Pharmacy is filling the prescription: [facility] [address] 8-514  Pharmacy Notified: YES  Patient Notified: **Instructed pharmacy to notify patient when script is ready to /ship.**

## 2025-02-20 ENCOUNTER — TELEPHONE (OUTPATIENT)
Dept: ENDOCRINOLOGY | Facility: CLINIC | Age: 60
End: 2025-02-20

## 2025-02-20 NOTE — TELEPHONE ENCOUNTER
PA Initiation    Medication: ZEPBOUND 7.5 MG/0.5ML SC SOAJ  Insurance Company: [employer] Non-Specialty PA's - Phone [phone] Fax [phone]  Pharmacy Filling the Rx: [facility] [place], [place] - [address] [place] [address] [phone]  Filling Pharmacy Phone:    Filling Pharmacy Fax:    Start Date: 2/20/2025    Key: [id number]

## 2025-02-24 NOTE — TELEPHONE ENCOUNTER
Prior Authorization Approval    Medication: ZEPBOUND 7.5 MG/0.5ML SC SOAJ  Authorization Effective Date: 2/20/2025  Authorization Expiration Date: 8/21/2025  Approved Dose/Quantity: 2ml per 28 days  Reference #: Key: [id number]   Insurance Company: [employer] Non-Specialty PA's - Phone [phone] Fax [phone]  Expected CoPay: $    CoPay Card Available:      Financial Assistance Needed:   Which Pharmacy is filling the prescription: [facility] [place], [place] - [address] [place] [address] 4-904  Pharmacy Notified:   Patient Notified:

## 2025-02-24 NOTE — TELEPHONE ENCOUNTER
Hello,    I'm so sorry, it seems that the PA was done for Zepbound because on our side generic Mounjaro and Zepbound share the same name of Tirzepatide. The bad news is that the PA was initiated and approved for Zepbound but the good news is that it seems that the patient's insurance covers Mounjaro without a PA and is being dispensed at our [facility]. If a PA is every needed for Mounjaro; we'll initiate the PA.    Thank You!    Maria De Jesus Chand [facility] Pharmacy Liaison  [facility]  [email or web address]  Phone: [phone]  Fax: [phone]    Mounjaro covered:

## 2025-02-25 DIAGNOSIS — E11.3213 TYPE 2 DIABETES MELLITUS WITH BOTH EYES AFFECTED BY MILD NONPROLIFERATIVE RETINOPATHY AND MACULAR EDEMA, WITH LONG-TERM CURRENT USE OF INSULIN (H): Primary | ICD-10-CM

## 2025-02-25 DIAGNOSIS — Z79.4 TYPE 2 DIABETES MELLITUS WITH BOTH EYES AFFECTED BY MILD NONPROLIFERATIVE RETINOPATHY AND MACULAR EDEMA, WITH LONG-TERM CURRENT USE OF INSULIN (H): Primary | ICD-10-CM

## 2025-02-25 PROCEDURE — 2022F DILAT RTA XM EVC RTNOPTHY: CPT | Performed by: OPHTHALMOLOGY

## 2025-02-26 ENCOUNTER — OFFICE VISIT (OUTPATIENT)
Dept: OPHTHALMOLOGY | Facility: CLINIC | Age: 60
End: 2025-02-26
Attending: OPHTHALMOLOGY
Payer: COMMERCIAL

## 2025-02-26 DIAGNOSIS — E11.3213 TYPE 2 DIABETES MELLITUS WITH BOTH EYES AFFECTED BY MILD NONPROLIFERATIVE RETINOPATHY AND MACULAR EDEMA, WITH LONG-TERM CURRENT USE OF INSULIN (H): Primary | ICD-10-CM

## 2025-02-26 DIAGNOSIS — Z79.4 TYPE 2 DIABETES MELLITUS WITH BOTH EYES AFFECTED BY MILD NONPROLIFERATIVE RETINOPATHY AND MACULAR EDEMA, WITH LONG-TERM CURRENT USE OF INSULIN (H): Primary | ICD-10-CM

## 2025-02-26 DIAGNOSIS — H25.812 COMBINED FORM OF AGE-RELATED CATARACT, LEFT EYE: ICD-10-CM

## 2025-02-26 DIAGNOSIS — H35.033 HYPERTENSIVE RETINOPATHY OF BOTH EYES: ICD-10-CM

## 2025-02-26 PROCEDURE — 92134 CPTRZ OPH DX IMG PST SGM RTA: CPT | Performed by: OPHTHALMOLOGY

## 2025-02-26 PROCEDURE — 92015 DETERMINE REFRACTIVE STATE: CPT

## 2025-02-26 PROCEDURE — 76519 ECHO EXAM OF EYE: CPT | Performed by: OPHTHALMOLOGY

## 2025-02-26 PROCEDURE — 99213 OFFICE O/P EST LOW 20 MIN: CPT | Performed by: OPHTHALMOLOGY

## 2025-02-26 ASSESSMENT — REFRACTION_MANIFEST
OD_AXIS: 030
OD_SPHERE: -0.50
OS_SPHERE: +0.75
OS_ADD: +2.50
OD_CYLINDER: +0.75
OS_CYLINDER: +1.00
OS_AXIS: 005
OD_ADD: +2.50

## 2025-02-26 ASSESSMENT — CONF VISUAL FIELD
OD_NORMAL: 1
OD_SUPERIOR_NASAL_RESTRICTION: 0
OS_INFERIOR_TEMPORAL_RESTRICTION: 0
OS_INFERIOR_NASAL_RESTRICTION: 0
OS_NORMAL: 1
METHOD: COUNTING FINGERS
OS_SUPERIOR_TEMPORAL_RESTRICTION: 0
OS_SUPERIOR_NASAL_RESTRICTION: 0
OD_INFERIOR_TEMPORAL_RESTRICTION: 0
OD_INFERIOR_NASAL_RESTRICTION: 0
OD_SUPERIOR_TEMPORAL_RESTRICTION: 0

## 2025-02-26 ASSESSMENT — CUP TO DISC RATIO
OD_RATIO: 0.1
OS_RATIO: 0.1

## 2025-02-26 ASSESSMENT — TONOMETRY
OD_IOP_MMHG: 15
OS_IOP_MMHG: 15
IOP_METHOD: TONOPEN

## 2025-02-26 ASSESSMENT — REFRACTION_WEARINGRX
OD_SPHERE: -0.25
OS_CYLINDER: +0.25
OD_CYLINDER: SPHERE
OD_ADD: +2.25
OS_AXIS: 006
OS_ADD: +2.25
OS_SPHERE: +1.50

## 2025-02-26 ASSESSMENT — SLIT LAMP EXAM - LIDS
COMMENTS: NORMAL
COMMENTS: NORMAL

## 2025-02-26 ASSESSMENT — VISUAL ACUITY
METHOD: SNELLEN - LINEAR
OD_SC: 20/25
OS_SC: 20/40

## 2025-02-26 ASSESSMENT — EXTERNAL EXAM - RIGHT EYE: OD_EXAM: NORMAL

## 2025-02-26 NOTE — PROGRESS NOTES
HPI       Diabetic Retinopathy Follow Up     Additional comments: 5 month follow up both eyes             Comments    Pt states vision is the same as last visit. No eye pain today. No new flashes or floaters. No redness. Dryness in each eye, relief when pt uses drops.  DM2 BS: 110 currently per pt.  Lab Results       Component                Value               Date                       A1C                      6.2                 07/05/2024                 A1C                      5.8                 03/01/2024                 A1C                      6.2                 11/30/2023                 A1C                      5.7                 07/31/2023                 A1C                      6.0                 09/27/2022                 A1C                      6.3                 03/31/2021                 A1C                      6.7                 02/12/2021                 A1C                      6.4                 11/24/2020                 A1C                      6.2                 10/06/2020                 A1C                      5.5                 04/11/2018              BINU Hernandez February 26, 2025 7:51 AM              Last edited by Maco Hines COMT on 2/26/2025  7:52 AM.         Review of systems for the eyes was negative other than the pertinent positives/negatives listed in the HPI.      Assessment & Plan    HPI:    Camacho Bhagat is a 59 year old male with history of T2DM, HTN, HLD, ED, liver transplant, laser in situ keratomileusis (LASIK) presents for followup Diabetic macular edema both eyes. Patient saw Saulo Parr in November 2024, edema was improving and opted to observe. Patient currently says vision is stable, notices left eye vision not as clear as right though not affected his day to day life.    POHx: lasik  PMHx: 2DM, HTN, HLD, ED, liver transplant  Current Medications:   Current Outpatient Medications   Medication Sig Dispense Refill    alcohol swab prep pads  Use to swab area of injection/francisca as directed. 100 each 3    alpha-lipoic acid 600 MG capsule Take 600 mg by mouth      amLODIPine (NORVASC) 10 MG tablet Take 1 tablet (10 mg) by mouth daily. 90 tablet 3    augmented betamethasone dipropionate (DIPROLENE-AF) 0.05 % external ointment Apply twice daily as needed for rash on the legs 45 g 11    cephALEXin (KEFLEX) 500 MG capsule Take 1 capsule (500 mg) by mouth 3 times daily. 30 capsule 0    cholecalciferol (VITAMIN D3) 1000 UNIT tablet Take 1 tablet (1,000 Units) by mouth daily 100 tablet 3    Continuous Glucose Sensor (DEXCOM G6 SENSOR) MISC Change every 10 days. 9 each 3    Continuous Glucose Transmitter (DEXCOM G6 TRANSMITTER) MISC Change every 3 months. 1 each 3    cyclobenzaprine (FLEXERIL) 10 MG tablet Take 1 tablet (10 mg) by mouth 3 times daily as needed for muscle spasms 90 tablet 3    doxazosin (CARDURA) 8 MG tablet Take 1 tablet (8 mg) by mouth at bedtime. Take a total of 10 mg at bedtime 90 tablet 3    doxycycline hyclate (VIBRAMYCIN) 100 MG capsule Take 1 capsule (100 mg) by mouth 2 times daily. 10 capsule 0    empagliflozin (JARDIANCE) 10 MG TABS tablet Take 1 tablet (10 mg) by mouth daily. 90 tablet 1    fluticasone (FLONASE) 50 MCG/ACT nasal spray Spray 1 spray into both nostrils daily 20 mL 3    furosemide (LASIX) 40 MG tablet Take 1 tablet (40 mg) by mouth daily . 90 tablet 3    gabapentin (NEURONTIN) 800 MG tablet Take 1 tablet (800 mg) by mouth 3 times daily 90 tablet 11    hydrALAZINE (APRESOLINE) 50 MG tablet Take 1 tablet (50 mg) by mouth 2 times daily. 180 tablet 3    hydrOXYzine (ATARAX) 25 MG tablet Take 1 tablet (25 mg) by mouth 3 times daily as needed for itching 90 tablet 3    Insulin Disposable Pump (OMNIPOD 5 G6 INTRO, GEN 5,) KIT 1 kit daily 1 kit 0    Insulin Disposable Pump (OMNIPOD 5 G6 POD, GEN 5,) MISC 1 each See Admin Instructions Use per 's instructions. 1 each 1    Insulin Disposable Pump (OMNIPOD 5 PODS, GEN 5,)  MISC 1 each See Admin Instructions. Change every 2 days. Use for insulin administration. 45 each 3    Insulin Lispro-aabc (LYUMJEV KWIKPEN) 200 UNIT/ML SOPN 1 Units by Other route See Admin Instructions. Via insulin pump. Approx 170 units daily plus 2-3 units daily to prime pump and tubing. 170 mL 3    insulin pen needle (BD ENE U/F) 32G X 4 MM miscellaneous Use 1 pen needle 4 times daily or as directed. 400 each 1    lidocaine (LIDODERM) 5 % patch Place 1 patch onto the skin every 24 hours To prevent lidocaine toxicity, patient should be patch free for 12 hrs daily. 30 patch 11    lidocaine (XYLOCAINE) 5 % external ointment Apply topically as needed for moderate pain 90 g 11    losartan (COZAAR) 25 MG tablet Take 1 tablet (25 mg) by mouth daily 90 tablet 3    methocarbamol (ROBAXIN) 500 MG tablet Take 1 tablet (500 mg) by mouth 2 times daily as needed for muscle spasms. 60 tablet 11    metoprolol succinate ER (TOPROL XL) 200 MG 24 hr tablet Take 1 tablet (200 mg) by mouth daily. 90 tablet 1    MOUNJARO 7.5 MG/0.5ML SOAJ 7.5 mg daily.      mycophenolic acid (GENERIC EQUIVALENT) 360 MG EC tablet Take 2 tablets (720 mg) by mouth every 12 hours. 360 tablet 3    oxyCODONE (ROXICODONE) 5 MG tablet Take 1 tablet (5 mg) by mouth 4 times daily as needed for pain. maximum 6 tablet(s) per day 30 tablet 0    predniSONE (DELTASONE) 2.5 MG tablet Take 1 tablet (2.5 mg) by mouth daily. 90 tablet 3    tacrolimus (GENERIC EQUIVALENT) 1 MG capsule Take 4 capsules (4 mg) by mouth every morning AND 3 capsules (3 mg) every evening. 630 capsule 3    Tacrolimus 1 MG PACK       Tirzepatide (MOUNJARO) 7.5 MG/0.5ML SOAJ Inject 0.5 mLs (7.5 mg) subcutaneously every 7 days. 2 mL 1    Tirzepatide (MOUNJARO) 7.5 MG/0.5ML SOAJ Inject 0.5 mLs (7.5 mg) subcutaneously every 7 days. 2 mL 0    Tirzepatide 7.5 MG/0.5ML SOAJ Inject 0.5 mLs (7.5 mg) subcutaneously once a week. 2 mL 1    tretinoin (RETIN-A) 0.025 % external cream Apply a pea-sized  amount evenly over the face at nighttime before bed 45 g 11    triamcinolone (KENALOG) 0.1 % external ointment Apply topically 2 times daily to the itching on the legs 80 g 1    ursodiol (ACTIGALL) 500 MG tablet Take 1 tablet (500 mg) by mouth 2 times daily 180 tablet 3     Current Facility-Administered Medications   Medication Dose Route Frequency Provider Last Rate Last Admin    bevacizumab (AVASTIN) intravitreal inj 1.25 mg  1.25 mg Intravitreal Q28 Days Taye Giordano MD        bevacizumab (AVASTIN) intravitreal inj 1.25 mg  1.25 mg Intravitreal Q28 Days Taye Giordano MD        bevacizumab (AVASTIN) intravitreal inj 1.25 mg  1.25 mg Intravitreal Q28 Days Stan Roque MD        bevacizumab (AVASTIN) intravitreal inj 1.25 mg  1.25 mg Intravitreal Q28 Days Stan Roque MD         PSHx: Cataract extraction/intraocular lens right eye 2/1/24    Current Eye Medications:    Assessment & Plan:    (Z96.1) Pseudophakia, right eye  (primary encounter diagnosis)  Cataract extraction/intraocular lens right eye [facility] 2/1/24    (H25.812) Combined forms of age-related cataract of left eye  Special equipment/needs:  Eye: left  Anesthesia: MAC topical  Dilates to: 6  Iris expansion:  POssible  Pseudoexfoliation: No  Trypan Blue: No  Trauma: No    Able lay to flat: Yes  Blood Thinner: No   Tamsulosin: No  DM: Yes  Guttae: No    Dominant Eye: right    Plan: -0.50    Cataract is believed to be significantly contributing to patient's visual impairment. Cataract surgery recommended and expected to improve vision. Vision is not correctable by glasses or other nonsurgical measures. R/B/A were discussed with the patient. These included, but are not limited to: bleeding, infection, loss of vision, loss of the eye, need for more surgery, glaucoma, retinal detachment, need for glasses, corneal edema. The patient voiced understanding and wishes to proceed. All lens options were discussed with the patient  including monofocal lenses, multifocal lenses, and toric lenses. The risks and benefits of each were discussed, including halos, glare, and possible need for glasses. No guarantees about vision after surgery were given. The patient voiced understanding and wishes to proceed    Proceed with CE/IOL left eye .  Avastin at time of Cataract extraction/intraocular lens     CC60WF 19.5 for right eye     (Z98.890) H/O laser assisted in situ keratomileusis  2003 with mild regression left eye     (H52.03,  H52.203,  H52.4) Hyperopia of both eyes with astigmatism and presbyopia  MRX provided on 2/26/2025    (E11.3213,  Z79.4) Type 2 diabetes mellitus with both eyes affected by mild nonproliferative retinopathy and macular edema, with long-term current use of insulin (H)  Diagnosed 2003  Most recent HgBA1c 6.2 on 7/5/24  Mild background diabetic retinopathy without neovascularization noted on today's exam.    Retina saw patient on 11/11/2024, no recommendation for Avastin at that time, edema though to be 2/2 diabetes rather than Benton Marga given bilaterality. Left eye vision changes likely from cataract.     Today with mildly worse edema left eye, stable right eye, will proceed with Cataract extraction/intraocular lens with Avastin at time of Cataract extraction/intraocular lens      (H35.033) Hypertensive retinopathy of both eyes  Evaluated by Dr. Saulo Parr on 11/11/2024; patient on amlodipine.    (H04.129) Dry eye    Return for POD0.    Stan Roque MD on 2/26/2025 at 8:14 AM    Stan Roque MD     Attending Physician Attestation:  Complete documentation of historical and exam elements from today's encounter can be found in the full encounter summary report (not reduplicated in this progress note).  I personally obtained the chief complaint(s) and history of present illness.  I confirmed and edited as necessary the review of systems, past medical/surgical history, family history, social history, and examination  findings as documented by others; and I examined the patient myself.  I personally reviewed the relevant tests, images, and reports as documented above.  I formulated and edited as necessary the assessment and plan and discussed the findings and management plan with the patient and family. - Stan Roque MD

## 2025-02-26 NOTE — NURSING NOTE
Chief Complaints and History of Present Illnesses   Patient presents with    Diabetic Retinopathy Follow Up     5 month follow up both eyes     Chief Complaint(s) and History of Present Illness(es)       Diabetic Retinopathy Follow Up              Comments: 5 month follow up both eyes              Comments    Pt states vision is the same as last visit. No eye pain today. No new flashes or floaters. No redness. Dryness in each eye, relief when pt uses drops.  DM2 BS: 110 currently per pt.  Lab Results       Component                Value               Date                       A1C                      6.2                 07/05/2024                 A1C                      5.8                 03/01/2024                 A1C                      6.2                 11/30/2023                 A1C                      5.7                 07/31/2023                 A1C                      6.0                 09/27/2022                 A1C                      6.3                 03/31/2021                 A1C                      6.7                 02/12/2021                 A1C                      6.4                 11/24/2020                 A1C                      6.2                 10/06/2020                 A1C                      5.5                 04/11/2018              BINU Hernandez February 26, 2025 7:51 AM

## 2025-02-28 ENCOUNTER — MYC REFILL (OUTPATIENT)
Dept: INTERNAL MEDICINE | Facility: CLINIC | Age: 60
End: 2025-02-28

## 2025-02-28 ENCOUNTER — LAB (OUTPATIENT)
Dept: LAB | Facility: CLINIC | Age: 60
End: 2025-02-28
Payer: COMMERCIAL

## 2025-02-28 DIAGNOSIS — E55.9 VITAMIN D DEFICIENCY: ICD-10-CM

## 2025-02-28 DIAGNOSIS — N18.32 STAGE 3B CHRONIC KIDNEY DISEASE (H): ICD-10-CM

## 2025-02-28 DIAGNOSIS — Z94.4 LIVER REPLACED BY TRANSPLANT (H): ICD-10-CM

## 2025-02-28 DIAGNOSIS — E11.42 DIABETIC POLYNEUROPATHY ASSOCIATED WITH TYPE 2 DIABETES MELLITUS (H): ICD-10-CM

## 2025-02-28 DIAGNOSIS — N18.31 STAGE 3A CHRONIC KIDNEY DISEASE (H): ICD-10-CM

## 2025-02-28 DIAGNOSIS — E87.5 HYPERKALEMIA: ICD-10-CM

## 2025-02-28 DIAGNOSIS — E11.21 CONTROLLED TYPE 2 DIABETES MELLITUS WITH DIABETIC NEPHROPATHY, UNSPECIFIED WHETHER LONG TERM INSULIN USE (H): ICD-10-CM

## 2025-02-28 PROBLEM — E11.3213 TYPE 2 DIABETES MELLITUS WITH BOTH EYES AFFECTED BY MILD NONPROLIFERATIVE RETINOPATHY AND MACULAR EDEMA, WITH LONG-TERM CURRENT USE OF INSULIN (H): Status: ACTIVE | Noted: 2025-02-26

## 2025-02-28 PROBLEM — Z79.4 TYPE 2 DIABETES MELLITUS WITH BOTH EYES AFFECTED BY MILD NONPROLIFERATIVE RETINOPATHY AND MACULAR EDEMA, WITH LONG-TERM CURRENT USE OF INSULIN (H): Status: ACTIVE | Noted: 2025-02-26

## 2025-02-28 PROBLEM — H25.812 COMBINED FORM OF AGE-RELATED CATARACT, LEFT EYE: Status: ACTIVE | Noted: 2025-02-26

## 2025-02-28 LAB
ALBUMIN SERPL BCG-MCNC: 4.2 G/DL (ref 3.5–5.2)
ALP SERPL-CCNC: 159 U/L (ref 40–150)
ALT SERPL W P-5'-P-CCNC: 30 U/L (ref 0–70)
ANION GAP SERPL CALCULATED.3IONS-SCNC: 12 MMOL/L (ref 7–15)
AST SERPL W P-5'-P-CCNC: 29 U/L (ref 0–45)
BILIRUB DIRECT SERPL-MCNC: 0.21 MG/DL (ref 0–0.3)
BILIRUB SERPL-MCNC: 0.4 MG/DL
BUN SERPL-MCNC: 37.8 MG/DL (ref 8–23)
CALCIUM SERPL-MCNC: 8.7 MG/DL (ref 8.8–10.4)
CHLORIDE SERPL-SCNC: 109 MMOL/L (ref 98–107)
CHOLEST SERPL-MCNC: 112 MG/DL
CREAT SERPL-MCNC: 1.89 MG/DL (ref 0.67–1.17)
EGFRCR SERPLBLD CKD-EPI 2021: 40 ML/MIN/1.73M2
ERYTHROCYTE [DISTWIDTH] IN BLOOD BY AUTOMATED COUNT: 13.3 % (ref 10–15)
EST. AVERAGE GLUCOSE BLD GHB EST-MCNC: 97 MG/DL
FASTING STATUS PATIENT QL REPORTED: NO
GLUCOSE SERPL-MCNC: 96 MG/DL (ref 70–99)
HBA1C MFR BLD: 5 %
HCO3 SERPL-SCNC: 21 MMOL/L (ref 22–29)
HCT VFR BLD AUTO: 30.6 % (ref 40–53)
HDLC SERPL-MCNC: 33 MG/DL
HGB BLD-MCNC: 9.7 G/DL (ref 13.3–17.7)
LDLC SERPL CALC-MCNC: 23 MG/DL
MCH RBC QN AUTO: 29.6 PG (ref 26.5–33)
MCHC RBC AUTO-ENTMCNC: 31.7 G/DL (ref 31.5–36.5)
MCV RBC AUTO: 93 FL (ref 78–100)
NONHDLC SERPL-MCNC: 79 MG/DL
PHOSPHATE SERPL-MCNC: 3.8 MG/DL (ref 2.5–4.5)
PLATELET # BLD AUTO: 101 10E3/UL (ref 150–450)
POTASSIUM SERPL-SCNC: 4.6 MMOL/L (ref 3.4–5.3)
PROT SERPL-MCNC: 6.5 G/DL (ref 6.4–8.3)
RBC # BLD AUTO: 3.28 10E6/UL (ref 4.4–5.9)
SODIUM SERPL-SCNC: 142 MMOL/L (ref 135–145)
TACROLIMUS BLD-MCNC: 3.4 UG/L (ref 5–15)
TME LAST DOSE: ABNORMAL H
TME LAST DOSE: ABNORMAL H
TRIGL SERPL-MCNC: 279 MG/DL
VIT D+METAB SERPL-MCNC: 51 NG/ML (ref 20–50)
WBC # BLD AUTO: 4 10E3/UL (ref 4–11)

## 2025-02-28 PROCEDURE — 85027 COMPLETE CBC AUTOMATED: CPT | Performed by: PATHOLOGY

## 2025-02-28 PROCEDURE — 84100 ASSAY OF PHOSPHORUS: CPT | Performed by: PATHOLOGY

## 2025-02-28 PROCEDURE — 83036 HEMOGLOBIN GLYCOSYLATED A1C: CPT

## 2025-02-28 PROCEDURE — 82306 VITAMIN D 25 HYDROXY: CPT

## 2025-02-28 PROCEDURE — 82248 BILIRUBIN DIRECT: CPT | Performed by: PATHOLOGY

## 2025-02-28 PROCEDURE — 80061 LIPID PANEL: CPT | Performed by: PATHOLOGY

## 2025-02-28 PROCEDURE — 80053 COMPREHEN METABOLIC PANEL: CPT | Performed by: PATHOLOGY

## 2025-02-28 PROCEDURE — 80197 ASSAY OF TACROLIMUS: CPT | Performed by: INTERNAL MEDICINE

## 2025-02-28 PROCEDURE — 36415 COLL VENOUS BLD VENIPUNCTURE: CPT | Performed by: PATHOLOGY

## 2025-02-28 PROCEDURE — 99000 SPECIMEN HANDLING OFFICE-LAB: CPT | Performed by: PATHOLOGY

## 2025-03-03 ENCOUNTER — OFFICE VISIT (OUTPATIENT)
Dept: NEPHROLOGY | Facility: CLINIC | Age: 60
End: 2025-03-03
Payer: COMMERCIAL

## 2025-03-03 VITALS
BODY MASS INDEX: 35.53 KG/M2 | OXYGEN SATURATION: 97 % | TEMPERATURE: 98.1 F | DIASTOLIC BLOOD PRESSURE: 69 MMHG | WEIGHT: 262.3 LBS | HEIGHT: 72 IN | SYSTOLIC BLOOD PRESSURE: 118 MMHG | HEART RATE: 81 BPM

## 2025-03-03 DIAGNOSIS — E87.5 HYPERKALEMIA: ICD-10-CM

## 2025-03-03 DIAGNOSIS — R80.1 PERSISTENT PROTEINURIA: ICD-10-CM

## 2025-03-03 DIAGNOSIS — N18.32 ANEMIA IN STAGE 3B CHRONIC KIDNEY DISEASE (H): ICD-10-CM

## 2025-03-03 DIAGNOSIS — N18.32 STAGE 3B CHRONIC KIDNEY DISEASE (H): Primary | ICD-10-CM

## 2025-03-03 DIAGNOSIS — D63.1 ANEMIA IN STAGE 3B CHRONIC KIDNEY DISEASE (H): ICD-10-CM

## 2025-03-03 DIAGNOSIS — I10 BENIGN ESSENTIAL HYPERTENSION: ICD-10-CM

## 2025-03-03 LAB
ALBUMIN MFR UR ELPH: <6 MG/DL
ALBUMIN UR-MCNC: NEGATIVE MG/DL
APPEARANCE UR: CLEAR
BILIRUB UR QL STRIP: NEGATIVE
COLOR UR AUTO: ABNORMAL
CREAT UR-MCNC: 82.8 MG/DL
GLUCOSE UR STRIP-MCNC: 500 MG/DL
HGB UR QL STRIP: NEGATIVE
KETONES UR STRIP-MCNC: NEGATIVE MG/DL
LEUKOCYTE ESTERASE UR QL STRIP: NEGATIVE
NITRATE UR QL: NEGATIVE
PH UR STRIP: 5 [PH] (ref 5–7)
PROT/CREAT 24H UR: NORMAL MG/G{CREAT}
SP GR UR STRIP: 1.01 (ref 1–1.03)
UROBILINOGEN UR STRIP-MCNC: NORMAL MG/DL

## 2025-03-03 PROCEDURE — 81003 URINALYSIS AUTO W/O SCOPE: CPT | Performed by: PATHOLOGY

## 2025-03-03 PROCEDURE — 99214 OFFICE O/P EST MOD 30 MIN: CPT

## 2025-03-03 PROCEDURE — 84156 ASSAY OF PROTEIN URINE: CPT | Performed by: PATHOLOGY

## 2025-03-03 PROCEDURE — 3074F SYST BP LT 130 MM HG: CPT

## 2025-03-03 PROCEDURE — 1125F AMNT PAIN NOTED PAIN PRSNT: CPT

## 2025-03-03 PROCEDURE — 99213 OFFICE O/P EST LOW 20 MIN: CPT

## 2025-03-03 PROCEDURE — 3078F DIAST BP <80 MM HG: CPT

## 2025-03-03 RX ORDER — FUROSEMIDE 40 MG/1
TABLET ORAL
Qty: 180 TABLET | Refills: 3 | Status: SHIPPED | OUTPATIENT
Start: 2025-03-03

## 2025-03-03 ASSESSMENT — PAIN SCALES - GENERAL: PAINLEVEL_OUTOF10: SEVERE PAIN (7)

## 2025-03-03 NOTE — NURSING NOTE
Chief Complaint   Patient presents with    RECHECK     Follow up.      Vitals:    03/03/25 0755 03/03/25 0757 03/03/25 0758 03/03/25 0800   BP: 117/69 118/69 121/69 118/69   BP Location: Right arm Right arm Right arm    Patient Position: Sitting Sitting Sitting    Cuff Size: Adult Large Adult Large Adult Large    Pulse: 81      Temp: 98.1  F (36.7  C)      TempSrc: Oral      SpO2: 97%      Weight: 119 kg (262 lb 4.8 oz)      Height: 1.829 m (6')          BP Readings from Last 3 Encounters:   03/03/25 118/69   11/06/24 135/87   11/04/24 (!) 171/97       /69   Pulse 81   Temp 98.1  F (36.7  C) (Oral)   Ht 1.829 m (6')   Wt 119 kg (262 lb 4.8 oz)   SpO2 97%   BMI 35.57 kg/m       Sunshine Heymfilemon

## 2025-03-03 NOTE — LETTER
3/3/2025       RE: Camacho Bhagat  [address]  [facility] [address]     Dear Colleague,    Thank you for referring your patient, Camacho Bhagat, to the [facility] [place] [facility]. Please see a copy of my visit note below.    Nephrology Clinic Visit 3/3/25    Assessment and Plan:       1. 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 Creat 1.8, eGFR 40 ml/mn.  UPCR neg today 0.7, 1.3, 2.2 g/g/Cr following addition of Losartan, Jardiance 1/24 and improved b/p control.  Baseline now ~ 2.0 since 3/24     Etiology of CKD likely multifactorial; DM, recurrent EJ, CNI.    - Blood pressures controlled    - We have had challenges with Losartan and hyperkalemia in the past but currently tolerating 25 mg every day.    - On SGLT2i   - Does not use NSAIDs.    - DM well controlled with A1c 6.2% ( 7/24)      2. HTN - Controlled w/o edema. Clinic b/ps 117-12/69 HR 81. Weight down following increase in diuretic therapy and Tirzepatide.      Current antihypertensive regimen:      Lasix 40 mg qd     Amlodipine 10 mg qd      Toprol  mg every day     Losartan 25 mg every day ( unable to increase given borderline K)     Doxazosin 10 mg HS     Hydralazine 50 mg bid       - Continue Lasix 40 mg every day and ok to take an extra dose for edema    3. Hyperkalemia assoc with Tac/ARB - No acute concerns. K 4.6 Na 142. Hyperkalemia thought to be assoc with ARB/TAC/Food/acidosis. .    - Hasn't needed a K binder for several years. He's done well with K in the upper 4 to low 5 range over this year   - Continue dietary Potassium modification      4. Volume status - No edema. Has mild LINARES. Weight 262 # from 285 #, 292.2 #, 290#, 287#, 284#, 250#. Was 242# in 12/20.  Blood pressures controlled   - Continue Lasix 40 mg every day w/extra for edema   - Continue Jardiance   - Continue to work on weight loss      5. Acid base - No acute concerns. Bicarb 21      6. BMD - Ca  8.7, Phos 3.8, albumin 4.2   - Vitamin D 51 ( 3/25)   - PTH 73 (11/24).   - Continue Vit D 1000 U qd      7.Anemia -Hgb 9.7    - Iron studies 11/24: Ferritin 604 Fe 80 IS 37    - Had colonoscopy 10/22    - Not on iron and has not needed DIPAK but getting close      8. Liver transplant IS: Tacrolimus, MMF, Pred. Tac level 3.4. LFTs normal   - Per transplant   - Last seen by Dr Camejo 11/4/24    9. DM2- Well controlled on Insulin pump. A1c 5.0 %.    - Tolerating Jardiance for DM/proteinuria since 1/24.    - Tolerating Tirzepatide for DM control/weight loss      10. Dispo- RTC 6 months for follow up with labs prior.       Reason for Visit:  CKD3b      HPI:  Mr Bhagat is a 58 yo male with CKD3b, HTN, Chronic hyperkalemia, DM2, Liver transplant, present today for CKD/HTN followup.   Last seen in clinic by me on 7/10/24   Hydralazine started in the interim  Baseline creat low to mid 1's.       Interval Hx:     No hospital admissions.       ROS:  Camacho has no renal concerns  Biggest challenge is his arthritis pain  Tolerating Tirzepatide w/o increase in his diarrhea. Has lost ~ 30 #    Using Furosemide 40 mg daily with extra prn for edema  Finding it hard to exercise outside of his work day given long hours with commute and chronic arthritis pain.   Using his CPAP   DM well controlled with recent A1c 5.0 %     Appetite good  Energy level low but working FT walking about 2 miles at work  Denies CP but does note mild LINARES  Has chronic diarrhea w/o abdominal pain   Has end of day edema.   Voiding w/o difficulty.       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 Former smoker,    by profession, now [employer]. Exercise is limited due to hip/knee/back pain    Family Hx:   Family History   Problem Relation Age of Onset     Arthritis Mother      Thyroid Cancer Mother         Survivor!     Thyroid Disease Mother         Thyroid cancer     Diabetes Father      Substance Abuse Father      Cervical Cancer Maternal Grandmother      Skin Cancer Maternal Grandfather      Cerebrovascular Disease Maternal Grandfather      No Known Problems Paternal Grandmother      Prostate Cancer Paternal Grandfather      Colon Cancer No family hx of      Hyperlipidemia No family hx of      Coronary Artery Disease No family hx of      Breast Cancer No family hx of      Glaucoma No family hx of      Hypertension No family hx of      Macular Degeneration No family hx of        Allergies:  Allergies   Allergen Reactions     Erythromycin GI Disturbance     Losartan Other (See Comments)     Consistently develops hyperkalemia     Vioxx      Nausea, vomiting       Medications:  Current Outpatient Medications   Medication Sig Dispense Refill     alcohol swab prep pads Use to swab area of injection/francisca as directed. 100 each 3     alpha-lipoic acid 600 MG capsule Take 600 mg by mouth       amLODIPine (NORVASC) 10 MG tablet Take 1 tablet (10 mg) by mouth daily. 90 tablet 3     augmented betamethasone dipropionate (DIPROLENE-AF) 0.05 % external ointment Apply twice daily as needed for rash on the legs 45 g 11     cholecalciferol (VITAMIN D3) 1000 UNIT tablet Take 1 tablet (1,000 Units) by mouth daily 100 tablet 3     Continuous Glucose Sensor (DEXCOM G6 SENSOR) MISC Change every 10 days. 9 each 3     Continuous Glucose Transmitter (DEXCOM G6 TRANSMITTER) MISC Change every 3 months. 1 each 3     cyclobenzaprine (FLEXERIL) 10 MG tablet Take 1 tablet (10 mg) by mouth 3 times daily as needed for muscle spasms 90 tablet 3     doxazosin (CARDURA) 8 MG tablet Take 1 tablet (8 mg) by mouth at bedtime. Take a total of 10 mg at  bedtime 90 tablet 3     doxycycline hyclate (VIBRAMYCIN) 100 MG capsule Take 1 capsule (100 mg) by mouth 2 times daily. 10 capsule 0     empagliflozin (JARDIANCE) 10 MG TABS tablet Take 1 tablet (10 mg) by mouth daily. 90 tablet 1     fluticasone (FLONASE) 50 MCG/ACT nasal spray Spray 1 spray into both nostrils daily 20 mL 3     furosemide (LASIX) 40 MG tablet Take 40 mg daily and OK to take an extra for LE edema. 180 tablet 3     gabapentin (NEURONTIN) 800 MG tablet Take 1 tablet (800 mg) by mouth 3 times daily 90 tablet 11     hydrALAZINE (APRESOLINE) 50 MG tablet Take 1 tablet (50 mg) by mouth 2 times daily. 180 tablet 3     hydrOXYzine (ATARAX) 25 MG tablet Take 1 tablet (25 mg) by mouth 3 times daily as needed for itching 90 tablet 3     Insulin Disposable Pump (OMNIPOD 5 G6 POD, GEN 5,) MISC 1 each See Admin Instructions Use per 's instructions. 1 each 1     Insulin Disposable Pump (OMNIPOD 5 PODS, GEN 5,) MISC 1 each See Admin Instructions. Change every 2 days. Use for insulin administration. 45 each 3     Insulin Lispro-aabc (LYUMJEV KWIKPEN) 200 UNIT/ML SOPN 1 Units by Other route See Admin Instructions. Via insulin pump. Approx 170 units daily plus 2-3 units daily to prime pump and tubing. 170 mL 3     insulin pen needle (BD ENE U/F) 32G X 4 MM miscellaneous Use 1 pen needle 4 times daily or as directed. 400 each 1     lidocaine (LIDODERM) 5 % patch Place 1 patch onto the skin every 24 hours To prevent lidocaine toxicity, patient should be patch free for 12 hrs daily. 30 patch 11     lidocaine (XYLOCAINE) 5 % external ointment Apply topically as needed for moderate pain 90 g 11     losartan (COZAAR) 25 MG tablet Take 1 tablet (25 mg) by mouth daily 90 tablet 3     methocarbamol (ROBAXIN) 500 MG tablet Take 1 tablet (500 mg) by mouth 2 times daily as needed for muscle spasms. 60 tablet 11     metoprolol succinate ER (TOPROL XL) 200 MG 24 hr tablet Take 1 tablet (200 mg) by mouth daily. 90  tablet 1     MOUNJARO 7.5 MG/0.5ML SOAJ 7.5 mg daily.       mycophenolic acid (GENERIC EQUIVALENT) 360 MG EC tablet Take 2 tablets (720 mg) by mouth every 12 hours. 360 tablet 3     oxyCODONE (ROXICODONE) 5 MG tablet Take 1 tablet (5 mg) by mouth 4 times daily as needed for pain. maximum 6 tablet(s) per day 30 tablet 0     predniSONE (DELTASONE) 2.5 MG tablet Take 1 tablet (2.5 mg) by mouth daily. 90 tablet 3     tacrolimus (GENERIC EQUIVALENT) 1 MG capsule Take 4 capsules (4 mg) by mouth every morning AND 3 capsules (3 mg) every evening. 630 capsule 3     Tacrolimus 1 MG PACK        Tirzepatide 7.5 MG/0.5ML SOAJ Inject 0.5 mLs (7.5 mg) subcutaneously once a week. 2 mL 1     tretinoin (RETIN-A) 0.025 % external cream Apply a pea-sized amount evenly over the face at nighttime before bed 45 g 11     triamcinolone (KENALOG) 0.1 % external ointment Apply topically 2 times daily to the itching on the legs 80 g 1     ursodiol (ACTIGALL) 500 MG tablet Take 1 tablet (500 mg) by mouth 2 times daily 180 tablet 3     No current facility-administered medications for this visit.      Vitals:  B/ps 117-121/69   HR 81  Weight 262 #    Exam:  GEN: Pleasant male in NAD  CARDIAC: RRR  LUNGS: CTA  ABDOMEN: Obese  EXT: no LE edema  NEURO: A/O    LABS:   CMP  Recent Labs   Lab Test 02/28/25  1650 11/01/24  0723 09/16/24  1657 07/08/24  1647 08/18/21  1236 06/08/21  1628 05/27/21  1729 03/31/21  1145 02/12/21  1331    137 136 141   < > 134 131* 138 138   POTASSIUM 4.6 5.1 4.3 4.7   < > 4.9 5.1 4.5 5.8*   CHLORIDE 109* 107 102 106   < > 105 102 109 111*   CO2 21* 22 19* 19*   < > 23 23 22 22   ANIONGAP 12 8 15 16*   < > 6 6 7 5   GLC 96 104* 224* 129*   < > 249* 335* 134* 251*   BUN 37.8* 32.9* 36.8* 45.2*   < > 43* 50* 54* 44*   CR 1.89* 1.94* 2.04* 2.18*   < > 1.51* 1.53* 1.56* 1.32*   GFRESTIMATED 40* 39* 37* 34*   < > 51* 50* 49* 60*   GFRESTBLACK  --   --   --   --   --  59* 58* 57* 69   JACOB 8.7* 9.1 8.8 8.9   < > 8.9 9.1 8.9  8.7    < > = values in this interval not displayed.     Recent Labs   Lab Test 02/28/25  1650 11/01/24  0723 09/16/24  1657 07/08/24  1647   BILITOTAL 0.4 0.5 0.4 0.3   ALKPHOS 159* 176* 177* 155*   ALT 30 16 18 20   AST 29 22 20 27     CBC  Recent Labs   Lab Test 02/28/25  1650 11/01/24  0723 09/16/24  1657 07/08/24  1647   HGB 9.7* 10.7* 10.7* 11.4*   WBC 4.0 4.9 6.1 6.1   RBC 3.28* 3.56* 3.45* 3.68*   HCT 30.6* 32.6* 30.9* 33.9*   MCV 93 92 90 92   MCH 29.6 30.1 31.0 31.0   MCHC 31.7 32.8 34.6 33.6   RDW 13.3 13.2 13.2 13.5   * 116* 125* 119*     URINE STUDIES  Recent Labs   Lab Test 03/03/25  0645 01/09/24 2010 01/30/23  0750 05/24/22  1035 11/24/20  1325   COLOR Light Yellow Light Yellow Light Yellow Light Yellow Yellow   APPEARANCE Clear Clear Clear Clear Clear   URINEGLC 500* Negative Negative Negative Negative   URINEBILI Negative Negative Negative Negative Negative   URINEKETONE Negative Negative Negative Negative Negative   SG 1.015 1.010 1.013 1.014 1.014   UBLD Negative Negative Negative Trace* Negative   URINEPH 5.0 5.5 5.0 5.5 5.0   PROTEIN Negative 50* 20* 100* 20*   NITRITE Negative Negative Negative Negative Negative   LEUKEST Negative Negative Negative Negative Negative   RBCU  --  1 1 1 1   WBCU  --  <1 <1 1 <1     Recent Labs   Lab Test 01/30/23  0750 09/02/22  1143 05/24/22  1035 03/03/22  1145   UTPG 0.34* 1.35* 2.26* 1.58*     PTH  Recent Labs   Lab Test 11/01/24  0723 03/01/24  1712 09/27/22  0841   PTHI 73* 65 31     IRON STUDIES  Recent Labs   Lab Test 11/01/24  0723 09/27/22  0841 05/27/21  1729   IRON 80 109 69   * 248 267   IRONSAT 37 44 26   NELY 604* 624* 935*       Cinda Cazares NP      Again, thank you for allowing me to participate in the care of your patient.      Sincerely,    Cinda Cazares NP

## 2025-03-03 NOTE — TELEPHONE ENCOUNTER
Controlled substance refill request notes    Refill request received for: Gabapentin 800 Mg Tablet  [place]  data reviewed 03/03/25:  Last prescription: Gabapentin 800 Mg Tablet, 90 tab, 30 day supply, 11 refills, written on 05/15/2023  Medication last refill: 180 tab, 60 day supply, filled/sold to patient on 03/27/2024  Pended order: Gabapentin 800 Mg Tablet, 90 tab, 30 day supply     Primary care provider: Shay Kirkpatrick  Last office visit this department: 8/12/2024 - Dr. Kirkpatrick  Next appointment with [facility]:  03/06/2025 - Jennie Newman NP (pre op)  03/24/2025 - Dr. Kirkpatrick    Refill request forwarded to provider for review.     Felicia BROWN LPN  [facility]

## 2025-03-04 RX ORDER — GABAPENTIN 800 MG/1
800 TABLET ORAL 3 TIMES DAILY
Qty: 90 TABLET | Refills: 1 | Status: SHIPPED | OUTPATIENT
Start: 2025-03-04

## 2025-03-04 NOTE — PROGRESS NOTES
Nephrology Clinic Visit 3/3/25    Assessment and Plan:       1. 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 Creat 1.8, eGFR 40 ml/mn.  UPCR neg today 0.7, 1.3, 2.2 g/g/Cr following addition of Losartan, Jardiance 1/24 and improved b/p control.  Baseline now ~ 2.0 since 3/24     Etiology of CKD likely multifactorial; DM, recurrent EJ, CNI.    - Blood pressures controlled    - We have had challenges with Losartan and hyperkalemia in the past but currently tolerating 25 mg every day.    - On SGLT2i   - Does not use NSAIDs.    - DM well controlled with A1c 6.2% ( 7/24)      2. HTN - Controlled w/o edema. Clinic b/ps 117-12/69 HR 81. Weight down following increase in diuretic therapy and Tirzepatide.      Current antihypertensive regimen:      Lasix 40 mg qd     Amlodipine 10 mg qd      Toprol  mg every day     Losartan 25 mg every day ( unable to increase given borderline K)     Doxazosin 10 mg HS     Hydralazine 50 mg bid       - Continue Lasix 40 mg every day and ok to take an extra dose for edema    3. Hyperkalemia assoc with Tac/ARB - No acute concerns. K 4.6 Na 142. Hyperkalemia thought to be assoc with ARB/TAC/Food/acidosis. .    - Hasn't needed a K binder for several years. He's done well with K in the upper 4 to low 5 range over this year   - Continue dietary Potassium modification      4. Volume status - No edema. Has mild LINARES. Weight 262 # from 285 #, 292.2 #, 290#, 287#, 284#, 250#. Was 242# in 12/20.  Blood pressures controlled   - Continue Lasix 40 mg every day w/extra for edema   - Continue Jardiance   - Continue to work on weight loss      5. Acid base - No acute concerns. Bicarb 21      6. BMD - Ca 8.7, Phos 3.8, albumin 4.2   - Vitamin D 51 ( 3/25)   - PTH 73 (11/24).   - Continue Vit D 1000 U qd      7.Anemia -Hgb 9.7    - Iron studies 11/24: Ferritin 604 Fe 80 IS 37    - Had colonoscopy 10/22    - Not on iron and has not needed DIPAK but getting close      8. Liver transplant IS: Tacrolimus, MMF, Pred. Tac  level 3.4. LFTs normal   - Per transplant   - Last seen by Dr Camejo 11/4/24    9. DM2- Well controlled on Insulin pump. A1c 5.0 %.    - Tolerating Jardiance for DM/proteinuria since 1/24.    - Tolerating Tirzepatide for DM control/weight loss      10. Dispo- RTC 6 months for follow up with labs prior.       Reason for Visit:  CKD3b      HPI:  Mr Bhagat is a 58 yo male with CKD3b, HTN, Chronic hyperkalemia, DM2, Liver transplant, present today for CKD/HTN followup.   Last seen in clinic by me on 7/10/24   Hydralazine started in the interim  Baseline creat low to mid 1's.       Interval Hx:     No hospital admissions.       ROS:  Camacho has no renal concerns  Biggest challenge is his arthritis pain  Tolerating Tirzepatide w/o increase in his diarrhea. Has lost ~ 30 #    Using Furosemide 40 mg daily with extra prn for edema  Finding it hard to exercise outside of his work day given long hours with commute and chronic arthritis pain.   Using his CPAP   DM well controlled with recent A1c 5.0 %     Appetite good  Energy level low but working FT walking about 2 miles at work  Denies CP but does note mild LINARES  Has chronic diarrhea w/o abdominal pain   Has end of day edema.   Voiding w/o difficulty.       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 Former smoker,   by profession, now [employer]. Exercise is limited due to hip/knee/back pain    Family Hx:   Family History   Problem Relation Age of Onset    Arthritis Mother     Thyroid Cancer Mother         Survivor!    Thyroid Disease Mother         Thyroid cancer    Diabetes Father     Substance Abuse Father      Cervical Cancer Maternal Grandmother     Skin Cancer Maternal Grandfather     Cerebrovascular Disease Maternal Grandfather     No Known Problems Paternal Grandmother     Prostate Cancer Paternal Grandfather     Colon Cancer No family hx of     Hyperlipidemia No family hx of     Coronary Artery Disease No family hx of     Breast Cancer No family hx of     Glaucoma No family hx of     Hypertension No family hx of     Macular Degeneration No family hx of        Allergies:  Allergies   Allergen Reactions    Erythromycin GI Disturbance    Losartan Other (See Comments)     Consistently develops hyperkalemia    Vioxx      Nausea, vomiting       Medications:  Current Outpatient Medications   Medication Sig Dispense Refill    alcohol swab prep pads Use to swab area of injection/francisca as directed. 100 each 3    alpha-lipoic acid 600 MG capsule Take 600 mg by mouth      amLODIPine (NORVASC) 10 MG tablet Take 1 tablet (10 mg) by mouth daily. 90 tablet 3    augmented betamethasone dipropionate (DIPROLENE-AF) 0.05 % external ointment Apply twice daily as needed for rash on the legs 45 g 11    cholecalciferol (VITAMIN D3) 1000 UNIT tablet Take 1 tablet (1,000 Units) by mouth daily 100 tablet 3    Continuous Glucose Sensor (DEXCOM G6 SENSOR) MISC Change every 10 days. 9 each 3    Continuous Glucose Transmitter (DEXCOM G6 TRANSMITTER) MISC Change every 3 months. 1 each 3    cyclobenzaprine (FLEXERIL) 10 MG tablet Take 1 tablet (10 mg) by mouth 3 times daily as needed for muscle spasms 90 tablet 3    doxazosin (CARDURA) 8 MG tablet Take 1 tablet (8 mg) by mouth at bedtime. Take a total of 10 mg at bedtime 90 tablet 3    doxycycline hyclate (VIBRAMYCIN) 100 MG capsule Take 1 capsule (100 mg) by mouth 2 times daily. 10 capsule 0    empagliflozin (JARDIANCE) 10 MG TABS tablet Take 1 tablet (10 mg) by mouth daily. 90 tablet 1    fluticasone (FLONASE) 50 MCG/ACT nasal spray Spray 1 spray into both nostrils daily 20 mL 3    furosemide  (LASIX) 40 MG tablet Take 40 mg daily and OK to take an extra for LE edema. 180 tablet 3    gabapentin (NEURONTIN) 800 MG tablet Take 1 tablet (800 mg) by mouth 3 times daily 90 tablet 11    hydrALAZINE (APRESOLINE) 50 MG tablet Take 1 tablet (50 mg) by mouth 2 times daily. 180 tablet 3    hydrOXYzine (ATARAX) 25 MG tablet Take 1 tablet (25 mg) by mouth 3 times daily as needed for itching 90 tablet 3    Insulin Disposable Pump (OMNIPOD 5 G6 POD, GEN 5,) MISC 1 each See Admin Instructions Use per 's instructions. 1 each 1    Insulin Disposable Pump (OMNIPOD 5 PODS, GEN 5,) MISC 1 each See Admin Instructions. Change every 2 days. Use for insulin administration. 45 each 3    Insulin Lispro-aabc (LYUMJEV KWIKPEN) 200 UNIT/ML SOPN 1 Units by Other route See Admin Instructions. Via insulin pump. Approx 170 units daily plus 2-3 units daily to prime pump and tubing. 170 mL 3    insulin pen needle (BD ENE U/F) 32G X 4 MM miscellaneous Use 1 pen needle 4 times daily or as directed. 400 each 1    lidocaine (LIDODERM) 5 % patch Place 1 patch onto the skin every 24 hours To prevent lidocaine toxicity, patient should be patch free for 12 hrs daily. 30 patch 11    lidocaine (XYLOCAINE) 5 % external ointment Apply topically as needed for moderate pain 90 g 11    losartan (COZAAR) 25 MG tablet Take 1 tablet (25 mg) by mouth daily 90 tablet 3    methocarbamol (ROBAXIN) 500 MG tablet Take 1 tablet (500 mg) by mouth 2 times daily as needed for muscle spasms. 60 tablet 11    metoprolol succinate ER (TOPROL XL) 200 MG 24 hr tablet Take 1 tablet (200 mg) by mouth daily. 90 tablet 1    MOUNJARO 7.5 MG/0.5ML SOAJ 7.5 mg daily.      mycophenolic acid (GENERIC EQUIVALENT) 360 MG EC tablet Take 2 tablets (720 mg) by mouth every 12 hours. 360 tablet 3    oxyCODONE (ROXICODONE) 5 MG tablet Take 1 tablet (5 mg) by mouth 4 times daily as needed for pain. maximum 6 tablet(s) per day 30 tablet 0    predniSONE (DELTASONE) 2.5 MG tablet  Take 1 tablet (2.5 mg) by mouth daily. 90 tablet 3    tacrolimus (GENERIC EQUIVALENT) 1 MG capsule Take 4 capsules (4 mg) by mouth every morning AND 3 capsules (3 mg) every evening. 630 capsule 3    Tacrolimus 1 MG PACK       Tirzepatide 7.5 MG/0.5ML SOAJ Inject 0.5 mLs (7.5 mg) subcutaneously once a week. 2 mL 1    tretinoin (RETIN-A) 0.025 % external cream Apply a pea-sized amount evenly over the face at nighttime before bed 45 g 11    triamcinolone (KENALOG) 0.1 % external ointment Apply topically 2 times daily to the itching on the legs 80 g 1    ursodiol (ACTIGALL) 500 MG tablet Take 1 tablet (500 mg) by mouth 2 times daily 180 tablet 3     No current facility-administered medications for this visit.      Vitals:  B/ps 117-121/69   HR 81  Weight 262 #    Exam:  GEN: Pleasant male in NAD  CARDIAC: RRR  LUNGS: CTA  ABDOMEN: Obese  EXT: no LE edema  NEURO: A/O    LABS:   CMP  Recent Labs   Lab Test 02/28/25  1650 11/01/24  0723 09/16/24  1657 07/08/24  1647 08/18/21  1236 06/08/21  1628 05/27/21  1729 03/31/21  1145 02/12/21  1331    137 136 141   < > 134 131* 138 138   POTASSIUM 4.6 5.1 4.3 4.7   < > 4.9 5.1 4.5 5.8*   CHLORIDE 109* 107 102 106   < > 105 102 109 111*   CO2 21* 22 19* 19*   < > 23 23 22 22   ANIONGAP 12 8 15 16*   < > 6 6 7 5   GLC 96 104* 224* 129*   < > 249* 335* 134* 251*   BUN 37.8* 32.9* 36.8* 45.2*   < > 43* 50* 54* 44*   CR 1.89* 1.94* 2.04* 2.18*   < > 1.51* 1.53* 1.56* 1.32*   GFRESTIMATED 40* 39* 37* 34*   < > 51* 50* 49* 60*   GFRESTBLACK  --   --   --   --   --  59* 58* 57* 69   JACOB 8.7* 9.1 8.8 8.9   < > 8.9 9.1 8.9 8.7    < > = values in this interval not displayed.     Recent Labs   Lab Test 02/28/25 1650 11/01/24 0723 09/16/24  1657 07/08/24  1647   BILITOTAL 0.4 0.5 0.4 0.3   ALKPHOS 159* 176* 177* 155*   ALT 30 16 18 20   AST 29 22 20 27     CBC  Recent Labs   Lab Test 02/28/25 1650 11/01/24 0723 09/16/24  1657 07/08/24  1647   HGB 9.7* 10.7* 10.7* 11.4*   WBC 4.0 4.9  6.1 6.1   RBC 3.28* 3.56* 3.45* 3.68*   HCT 30.6* 32.6* 30.9* 33.9*   MCV 93 92 90 92   MCH 29.6 30.1 31.0 31.0   MCHC 31.7 32.8 34.6 33.6   RDW 13.3 13.2 13.2 13.5   * 116* 125* 119*     URINE STUDIES  Recent Labs   Lab Test 03/03/25  0645 01/09/24 2010 01/30/23  0750 05/24/22  1035 11/24/20  1325   COLOR Light Yellow Light Yellow Light Yellow Light Yellow Yellow   APPEARANCE Clear Clear Clear Clear Clear   URINEGLC 500* Negative Negative Negative Negative   URINEBILI Negative Negative Negative Negative Negative   URINEKETONE Negative Negative Negative Negative Negative   SG 1.015 1.010 1.013 1.014 1.014   UBLD Negative Negative Negative Trace* Negative   URINEPH 5.0 5.5 5.0 5.5 5.0   PROTEIN Negative 50* 20* 100* 20*   NITRITE Negative Negative Negative Negative Negative   LEUKEST Negative Negative Negative Negative Negative   RBCU  --  1 1 1 1   WBCU  --  <1 <1 1 <1     Recent Labs   Lab Test 01/30/23  0750 09/02/22  1143 05/24/22  1035 03/03/22  1145   UTPG 0.34* 1.35* 2.26* 1.58*     PTH  Recent Labs   Lab Test 11/01/24  0723 03/01/24  1712 09/27/22  0841   PTHI 73* 65 31     IRON STUDIES  Recent Labs   Lab Test 11/01/24  0723 09/27/22  0841 05/27/21  1729   IRON 80 109 69   * 248 267   IRONSAT 37 44 26   NELY 604* 624* 935*       Cinda Cazares, [facility]

## 2025-03-04 NOTE — PROGRESS NOTES
SUBJECTIVE:  Camacho Bhagat is a 56 year old male with PMHx of CASTAÑEDA/HCC s/p liver transplant (3/2017), fibromyalgia, DVT, HTN, RONNIE, OA, and CVA who presents for follow-up of type 2 DM, diagnosed in 2002.  Oral meds first, later placed on insulin.     Related history: h/o CASTAÑEDA cirrhosis and HCC s/p liver transplant 3/2017. Course complicated by acute abdomen/neutropenic fever requiring right hemicolectomy and colostomy Fall 2017. He later underwent colostomy takedown 1/5/18 which was complicated by abdominal wall abscess at a drain site. During this course, he has also developed excessive proteinuria which is followed by nephrology. .    Hemoglobin A1c today was 5.3%.    The patient transition to Omnipod insulin pump and Dexcom sensor in August 2021.  I reviewed both insulin pump and sensor records.    Insulin pump settings:  Basal rate 1.4 units/h  Insulin to carbohydrate ratio 1 unit per 8 g  Correction 34  Active insulin time 3 hours  Blood glucose target 100  Correction above 125  He uses Humalog 200 pens to fill the pump reservoir.     Total daily insulin dose is 62 units, of which 54 is basal insulin.  He very rarely overrides the insulin pump settings.  In a regular day, he has 2 meals (late breakfast and dinner) and he rarely snacks (he cannot identify the time of the day when he might have a snack).  Denies having anything to eat after 8 PM.  At bedtime, he does take a correction bolus.  On the sensor, the average glucose is 156, with a standard deviation of 55, corresponding to a GMI of 7%.  62% of the sensor glucose numbers are within target of , 5% are above 250, 3% are in the hypoglycemic range and less than 1% are below 54.  There are rare episodes when the blood sugar is consistently low on the sensor, 4 hours.  The patient remembers not being symptomatic at that time.  The sensor reveals a trend towards mild hypoglycemia predinner, the days when he might not have any snacks, and a mildly  elevated blood sugar late evening.    He has progressively gained weight over the last year and the patient attributes this to decreased physical activity, now restricted to walking and playing with his dog.    Related meds:  Prednisone started for transplant and the dose remains 2.5 mg per day.     His blood pressure is now treated with a maximum dose of metoprolol and Lasix, 40 mg twice daily.  The patient has had the blood pressure addressed with nephrology, during a recent visit.    Complications  + chronic complications.      1. Retinopathy: No known eye disease  Last eye exam was 11/12/19.  H/o DR. He has cataracts.       2.  Nephropathy: yes. CKD IIIa . He has significant proteinuria and f/up with nephrology.  Recent GFR in the 50s. He was started on losartan on 4/4/18 and the dose was increased to 50 mg daily. Discontinued losartan around 3/2019 due to hyperkalemia.     3. Neuropathy.  Gabapentin was prescribed to control the neuropathic pain in the left foot, which started over 10 years ago.  No ulcers or infection. No amputation.  He now has bilateral foot pain.     4. Lipids: not on statin . last LDL 78 this month    In general, he is able to recognize the hypoglycemic episodes: He feels flushed, warm and dizzy.  Denies experiencing tremor or palpitations.  The lowest blood glucose he remembers experiencing was in the 50s.  He does have glucagon injection at home.     Past Medical History  Past Medical History:   Diagnosis Date     Depressive disorder, not elsewhere classified      Diabetes type 2, controlled (H) 11/10/2016     Esophageal reflux      Fibromyalgia 1/2009    dx with Dr Benitez( Rhe[facility])     Gangrene of finger (H) 8/25/2017     H/O deep venous thrombosis 11/2001    Permanent IVC filter in place.     H/O CASTAÑEDA (nonalcoholic steatohepatitis)      H/O Pneumonia, organism unspecified(486) 10/2001    Included ARDS, sepsis, and  acute renal failure; hospitalized, required tracheostomy placement.      H/O: HTN (hypertension) 11/2001    No longer prescribed antihypertensive medication.       History of CVA (cerebrovascular accident)      History of hepatocellular carcinoma      History of liver transplant (H)      History of obstructive sleep apnea     No longer wears CPAP since losing approximately 200 pounds with his liver transplant and its complications.       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     Osteoarthritis      Presence of PERMANENT IVC filter      Rheumatoid arthritis(714.0)      Past Surgical History  Past Surgical History:   Procedure Laterality Date     BENCH LIVER N/A 3/4/2017    Procedure: BENCH LIVER;  Surgeon: Jovan Tran MD;  Location:  OR      TOTAL KNEE ARTHROPLASTY  2008    Right knee arthroscopy     CHOLECYSTECTOMY       COLONOSCOPY N/A 7/21/2017    Procedure: COMBINED COLONOSCOPY, SINGLE OR MULTIPLE BIOPSY/POLYPECTOMY BY BIOPSY;  Colonoscopy;  Surgeon: Izaiah Montes MD;  Location:  GI     ENDOSCOPIC RETROGRADE CHOLANGIOPANCREATOGRAM N/A 5/22/2018    Procedure: COMBINED ENDOSCOPIC RETROGRADE CHOLANGIOPANCREATOGRAPHY, PLACE TUBE/STENT;  Endoscopic Retrograde Cholangiopancreatography with sphincterotomy and pancreatic duct stent placement;  Surgeon: Zay Gaitan MD;  [facility]     ENT SURGERY      Repair of deviated septum     ESOPHAGOSCOPY, GASTROSCOPY, DUODENOSCOPY (EGD), COMBINED N/A 8/4/2016    Procedure: COMBINED ESOPHAGOSCOPY, GASTROSCOPY, DUODENOSCOPY (EGD), BIOPSY SINGLE OR MULTIPLE;  Surgeon: Trent Pederson MD;  Location:  GI     ESOPHAGOSCOPY, GASTROSCOPY, DUODENOSCOPY (EGD), COMBINED N/A 8/27/2017    Procedure: COMBINED ESOPHAGOSCOPY, GASTROSCOPY, DUODENOSCOPY (EGD);;  Surgeon: Los Wynn MD;  Location:  GI     INCISION AND DRAINAGE ABDOMEN WASHOUT,  COMBINED Right 2018    Procedure: COMBINED INCISION AND DRAINAGE ABDOMEN WASHOUT;  COMBINED INCISION AND DRAINAGE right ABDOMEN flank;  Surgeon: Sara Dinh MD;  [facility]     LAPAROTOMY EXPLORATORY N/A 2017    Procedure: LAPAROTOMY EXPLORATORY;  Exploratory Laparotomy, washout;  Surgeon: Tip Zhang MD;  [facility]     LAPAROTOMY EXPLORATORY N/A 2017    Procedure: LAPAROTOMY EXPLORATORY;  Exploratory Laparotomy, Washout with closure.;  Surgeon: Tip Zhang MD;  [facility]     LAPAROTOMY EXPLORATORY N/A 2017    Procedure: LAPAROTOMY EXPLORATORY;  Exploratory Laparotomy, Right angelique-colectomy, end ileostomy, mucosal fistula, partial omentectomy;  Surgeon: Sara Dinh MD;  [facility]     ORTHOPEDIC SURGERY Right     Repair of dislocated wrist.       RELEASE TRIGGER FINGER Right 11/10/2017    Procedure: RELEASE TRIGGER FINGER;  Debride, V-Y Flap Right Index Finger;  Surgeon: Momo Noonan MD;  [facility]     TAKEDOWN ILEOSTOMY N/A 2018    Procedure: TAKEDOWN ILEOSTOMY;  Exploratory Laparotomy, Lysis of Adhesions, Takedown Of End Ileostomy, Takedown of mucocutaneous fistula, ileocolic resection  And Colorectal Anastomosis, Primary repair of Ventral Hernia, Anesthesia Block ;  Surgeon: Sara Dinh MD;  [facility]     TRACHEOSTOMY  2001    During hospitalization for pneumonia.       TRANSPLANT LIVER RECIPIENT  DONOR N/A 3/4/2017    Procedure: TRANSPLANT LIVER RECIPIENT  DONOR;  Surgeon: Jovan Tran MD;  [facility]        Medications    Current Outpatient Medications:      alcohol swab prep pads, Use to swab area of injection/francisca as directed., Disp: 100 each, Rfl: 3     alpha-lipoic acid 600 MG capsule, Take 600 mg by mouth, Disp: , Rfl:      amLODIPine (NORVASC) 10 MG tablet, Take 1 tablet (10 mg) by mouth daily, Disp: 90 tablet, Rfl: 3     blood glucose (NO BRAND  SPECIFIED) lancets standard, Use to test blood sugar 1 times daily or as directed., Disp: 100 lancet, Rfl: 3     blood glucose (NO BRAND SPECIFIED) test strip, Use to test blood sugar 1 times daily or as directed., Disp: 50 strip, Rfl: 11     blood glucose (NO BRAND SPECIFIED) test strip, Use to test blood sugar 3 times daily or as directed. Any covered brand preferred by Pt that works with meter., Disp: 300 strip, Rfl: 3     blood glucose calibration (NO BRAND SPECIFIED) solution, Use to calibrate meter., Disp: 1 Bottle, Rfl: 3     blood glucose monitoring (NO BRAND SPECIFIED) meter device kit, Use to test blood sugar 3 times daily or as directed. Any covered brand preferred by Pt., Disp: 1 kit, Rfl: 1     Blood Glucose Monitoring Suppl (CONTOUR BLOOD GLUCOSE SYSTEM) w/Device KIT, For use with the Omnipod Dash insulin pump, Disp: 1 kit, Rfl: 1     cholecalciferol (VITAMIN D3) 1000 UNIT tablet, Take 1 tablet (1,000 Units) by mouth daily (Patient taking differently: Take 1,000 Units by mouth every morning ), Disp: 100 tablet, Rfl: 3     CIALIS 5 MG tablet, Take 5 mg by mouth as needed , Disp: , Rfl: 1     Continuous Blood Gluc Sensor (DEXCOM G6 SENSOR) MISC, Change every 10 days., Disp: 9 each, Rfl: 3     Continuous Blood Gluc Transmit (DEXCOM G6 TRANSMITTER) MISC, Change every 3 months., Disp: 1 each, Rfl: 3     cyclobenzaprine (FLEXERIL) 10 MG tablet, Take 1 tablet (10 mg) by mouth 3 times daily as needed for muscle spasms, Disp: 90 tablet, Rfl: 3     fluticasone (FLONASE) 50 MCG/ACT nasal spray, Spray 1 spray into both nostrils daily, Disp: 15 mL, Rfl: 1     furosemide (LASIX) 40 MG tablet, Take 1 tablet (40 mg) by mouth 2 times daily, Disp: 180 tablet, Rfl: 3     gabapentin (NEURONTIN) 300 MG capsule, Take 2 capsules (600 mg) by mouth 3 times daily, Disp: 540 capsule, Rfl: 3     Glucose Blood (BLOOD GLUCOSE TEST STRIPS) STRP, 1 strip by Lancet route 3 times daily, Disp: 300 each, Rfl: 3     Insulin Disposable  Pump (OMNIPOD DASH 5 PACK PODS) MISC, 1 each every 48 hours, Disp: 45 each, Rfl: 3     Insulin Disposable Pump (OMNIPOD DASH 5 PACK PODS) MISC, 1 each every 3 days, Disp: 6 each, Rfl: 3     insulin glargine (LANTUS SOLOSTAR) 100 UNIT/ML pen, Inject 65 Units Subcutaneous every morning, Disp: 60 mL, Rfl: 3     Insulin Lispro (HUMALOG KWIKPEN) 200 UNIT/ML soln, Up to 100 units per day per insulin pump, Disp: 30 mL, Rfl: 3     Insulin Lispro (HUMALOG KWIKPEN) 200 UNIT/ML soln, Use one unit per 3 grams carbohydrates for all meals and snacks. Total daily dose up to 100 U., Disp: 45 mL, Rfl: 3     insulin pen needle (BD ENE U/F) 32G X 4 MM miscellaneous, Use 1 pen needle 4 times daily or as directed., Disp: 400 each, Rfl: 1     metoprolol succinate ER (TOPROL-XL) 200 MG 24 hr tablet, Take 1 tablet (200 mg) by mouth daily, Disp: 90 tablet, Rfl: 3     multivitamin, therapeutic with minerals (MULTI-VITAMIN) TABS tablet, Take 1 tablet by mouth every morning, Disp: , Rfl:      mycophenolic acid (GENERIC EQUIVALENT) 360 MG EC tablet, TAKE 2 TABLETS (720 MG) BY MOUTH EVERY 12 HOURS, Disp: 360 tablet, Rfl: 3     nicotine (NICODERM CQ) 14 MG/24HR 24 hr patch, Place 1 patch onto the skin every 24 hours, Disp: 28 patch, Rfl: 11     omeprazole (PRILOSEC) 20 MG DR capsule, Take 1 capsule (20 mg) by mouth daily, Disp: 90 capsule, Rfl: 3     oxyCODONE (ROXICODONE) 5 MG tablet, Take 1 tablet (5 mg) by mouth every 4 hours as needed for pain maximum 6 tablet(s) per day, Disp: 30 tablet, Rfl: 0     patiromer (VELTASSA) 8.4 g packet, Take 8.4 g by mouth daily, Disp: 90 each, Rfl: 3     predniSONE (DELTASONE) 2.5 MG tablet, Take 1 tablet (2.5 mg) by mouth daily, Disp: 90 tablet, Rfl: 3     sildenafil (REVATIO) 20 MG tablet, Take 2 tablets (40 mg) by mouth daily as needed (erectile dysfunction), Disp: 10 tablet, Rfl: 11     tacrolimus (GENERIC EQUIVALENT) 1 MG capsule, Take 4 capsules (4 mg) by mouth every 12 hours, Disp: 730 capsule, Rfl:  3     thin (NO BRAND SPECIFIED) lancets, Use with lanceting device. Any covered brand., Disp: 300 each, Rfl: 3     ursodiol (ACTIGALL) 500 MG tablet, Take 1 tablet (500 mg) by mouth 2 times daily, Disp: 180 tablet, Rfl: 3     varenicline (CHANTIX KEVEN) 0.5 MG X 11 & 1 MG X 42 tablet, Take 0.5 mg tab daily for 3 days, THEN 0.5 mg tab twice daily for 4 days, THEN 1 mg twice daily., Disp: 53 tablet, Rfl: 0    Social history:   . Former . He used to work as a nurse at [employer].  Former smoker for 2 years, 0.75 packs/day, quit in 1988.  He used to chew tobacco and he stopped this in 2015.  He has not been drinking any alcohol since the liver transplant. 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     OBJECTIVE:    Wt Readings from Last 10 Encounters:   12/06/21 127 kg (280 lb)   12/03/21 128.8 kg (284 lb)   06/07/21 114.3 kg (252 lb)   06/01/21 113.9 kg (251 lb)   06/01/21 114.3 kg (251 lb 14.4 oz)   12/14/20 109.8 kg (242 lb)   12/02/20 109.8 kg (242 lb)   10/20/20 112.5 kg (248 lb)   09/29/20 112.5 kg (248 lb)   05/19/20 105.7 kg (233 lb)     BP (!) 166/92   Pulse 91   Ht 1.829 m (6')   Wt 127 kg (280 lb)   BMI 37.97 kg/m       Physical exam  General appearance: No distress noted, General obesity.  Normal male hair pattern.   Bilateral gynecomastia with no palpable nodules  Eyes: conjunctivae and extraocular motions are normal. Pupils are equal, round, and reactive to light. No lid lag, no stare.  HENT: oropharynx clear and moist; neck no JVD, no bruits, no thyromegaly, no palpable nodules  Cardiovascular: regular rhythm, no murmurs, distal pulses palpable, no edema  Respiratory: chest clear, no rales, no rhonchi  Gastrointestinal: abdomen soft, nontender, nondistended, +BS, no organomegaly  Musculoskeletal: normal tone and strength   Neurologic: reflexes normal and symmetric, no resting tremor  Psychiatric: affect and judgment normal   Skin: Significant  abdominal scarring  Feet: Onychomycosis, sensation impaired to monofilament testing on the soles of both feet.    Lab Results   Component Value Date    A1C 7.1 (H) 08/18/2021    A1C 6.3 (H) 03/31/2021    A1C 6.7 (H) 02/12/2021    A1C 6.4 (H) 11/24/2020    A1C 6.2 (H) 10/06/2020    A1C 5.5 04/11/2018    HEMOGLOBINA1 5.8 11/18/2019    HEMOGLOBINA1 6.1 (A) 11/19/2018       Hemoglobin   Date Value Ref Range Status   12/01/2021 12.9 (L) 13.3 - 17.7 g/dL Final   05/27/2021 11.9 (L) 13.3 - 17.7 g/dL Final     Hematocrit   Date Value Ref Range Status   12/01/2021 40.0 40.0 - 53.0 % Final   05/27/2021 34.7 (L) 40.0 - 53.0 % Final     Cholesterol   Date Value Ref Range Status   12/01/2021 157 <200 mg/dL Final   11/24/2020 134 <200 mg/dL Final     Cholesterol/HDL Ratio   Date Value Ref Range Status   07/03/2012 3.8 0.0 - 5.0 Final     HDL Cholesterol   Date Value Ref Range Status   11/24/2020 39 (L) >39 mg/dL Final     Direct Measure HDL   Date Value Ref Range Status   12/01/2021 38 (L) >=40 mg/dL Final     LDL Cholesterol Calculated   Date Value Ref Range Status   12/01/2021 78 <=100 mg/dL Final   11/24/2020 35 <100 mg/dL Final     Comment:     Desirable:       <100 mg/dl     VLDL-Cholesterol   Date Value Ref Range Status   07/03/2012 36 (H) 0 - 30 mg/dL Final     Triglycerides   Date Value Ref Range Status   12/01/2021 203 (H) <150 mg/dL Final   11/24/2020 298 (H) <150 mg/dL Final     Comment:     Borderline high:  150-199 mg/dl  High:             200-499 mg/dl  Very high:       >499 mg/dl       Albumin Urine mg/L   Date Value Ref Range Status   10/27/2017 122 mg/L Final     TSH   Date Value Ref Range Status   04/02/2018 2.74 0.40 - 4.00 mU/L Final     Last Basic Metabolic Panel:    Sodium   Date Value Ref Range Status   12/01/2021 140 133 - 144 mmol/L Final   06/08/2021 134 133 - 144 mmol/L Final     Potassium   Date Value Ref Range Status   12/01/2021 5.4 (H) 3.4 - 5.3 mmol/L Final   06/08/2021 4.9 3.4 - 5.3 mmol/L Final      Chloride   Date Value Ref Range Status   12/01/2021 115 (H) 94 - 109 mmol/L Final   06/08/2021 105 94 - 109 mmol/L Final     Calcium   Date Value Ref Range Status   12/01/2021 8.7 8.5 - 10.1 mg/dL Final   06/08/2021 8.9 8.5 - 10.1 mg/dL Final     Carbon Dioxide   Date Value Ref Range Status   06/08/2021 23 20 - 32 mmol/L Final     Carbon Dioxide (CO2)   Date Value Ref Range Status   12/01/2021 21 20 - 32 mmol/L Final     Urea Nitrogen   Date Value Ref Range Status   12/01/2021 39 (H) 7 - 30 mg/dL Final   06/08/2021 43 (H) 7 - 30 mg/dL Final     Creatinine   Date Value Ref Range Status   12/01/2021 1.48 (H) 0.66 - 1.25 mg/dL Final   06/08/2021 1.51 (H) 0.66 - 1.25 mg/dL Final     GFR Estimate   Date Value Ref Range Status   12/01/2021 52 (L) >60 mL/min/1.73m2 Final     Comment:     As of July 11, 2021, eGFR is calculated by the CKD-EPI creatinine equation, without race adjustment. eGFR can be influenced by muscle mass, exercise, and diet. The reported eGFR is an estimation only and is only applicable if the renal function is stable.   06/08/2021 51 (L) >60 mL/min/[1.73_m2] Final     Comment:     Non  GFR Calc  Starting 12/18/2018, serum creatinine based estimated GFR (eGFR) will be   calculated using the Chronic Kidney Disease Epidemiology Collaboration   (CKD-EPI) equation.       Glucose   Date Value Ref Range Status   12/01/2021 85 70 - 99 mg/dL Final   06/08/2021 249 (H) 70 - 99 mg/dL Final       AST   Date Value Ref Range Status   12/01/2021 21 0 - 45 U/L Final   05/27/2021 19 0 - 45 U/L Final     ALT   Date Value Ref Range Status   12/01/2021 38 0 - 70 U/L Final   05/27/2021 32 0 - 70 U/L Final     Albumin Urine mg/g Cr   Date Value Ref Range Status   10/27/2017 132.46 (H) 0 - 17 mg/g Cr Final     ASSESSMENT/PLAN:    Camacho Bhagat is a 56 year old man w/ PMx of CASTAÑEDA/HCC s/p liver transplant (3/2017), fibromyalgia, DVT, HTN, RONNIE, OA, and CVA who presents for follow-up of his DM-II     1. Type  2 Diabetes  A1c less reliable in the context of mild stable anemia.  Overall, the glycemic control is good.  Recommended minor changes in the insulin pump settings in order to address postprandial and nocturnal hyperglycemia, and the tendency of the blood sugar to decrease prior to dinner.    Recommendations:  Change basal rate: 12 midnight 1.45 U/hr; 3 am 1.4 U/hr; 3 pm 1.35 U/hr; 6 PM 1.4 U/hr, 9 PM 1.45 U/hr   Change insulin to carb ratio to 1 U per 7.5 gm CHO   Change correction to 30   For asymptomatic hypoglycemic episodes noted on the sensor, always check the blood sugar using the glucometer.  Bolus for all meals and snacks, according to the carbohydrate intake  Schedule an eye exam   Have the pump downloaded for our review as needed  Return to clinic in 3 to 4 months    Discussed with the patient the option of considering treatment with GLP-1 agonist in order to address the weight gain.  He prefers to work on his diet for now.    2.  Hypertension, uncontrolled  The patient prefers to follow-up on this with nephrology.    42 minutes spent on the date of the encounter doing chart review, history and exam, documentation and further activities as noted above. This time excludes time spent reviewing CGM.     Answers for HPI/ROS submitted by the patient on 12/1/2021  General Symptoms: No  Skin Symptoms: No  HENT Symptoms: No  EYE SYMPTOMS: No  HEART SYMPTOMS: No  LUNG SYMPTOMS: No  INTESTINAL SYMPTOMS: No  URINARY SYMPTOMS: No  REPRODUCTIVE SYMPTOMS: Yes  SKELETAL SYMPTOMS: Yes  BLOOD SYMPTOMS: No  NERVOUS SYSTEM SYMPTOMS: No  MENTAL HEALTH SYMPTOMS: No  Scrotal pain or swelling: No  Erectile dysfunction: Yes  Penile discharge: No  Genital ulcers: No  Reduced libido: No  Back pain: Yes  Muscle aches: No  Neck pain: Yes  Swollen joints: No  Joint pain: Yes  Bone pain: No  Muscle cramps: No  Muscle weakness: No  Joint stiffness: Yes  Bone fracture: No       Yes

## 2025-03-06 ENCOUNTER — OFFICE VISIT (OUTPATIENT)
Dept: INTERNAL MEDICINE | Facility: CLINIC | Age: 60
End: 2025-03-06
Payer: COMMERCIAL

## 2025-03-06 VITALS
HEART RATE: 82 BPM | OXYGEN SATURATION: 96 % | HEIGHT: 72 IN | BODY MASS INDEX: 35.7 KG/M2 | DIASTOLIC BLOOD PRESSURE: 74 MMHG | TEMPERATURE: 98.2 F | SYSTOLIC BLOOD PRESSURE: 134 MMHG | RESPIRATION RATE: 16 BRPM | WEIGHT: 263.6 LBS

## 2025-03-06 DIAGNOSIS — Z01.818 PREOP GENERAL PHYSICAL EXAM: Primary | ICD-10-CM

## 2025-03-06 DIAGNOSIS — H25.812 COMBINED FORM OF AGE-RELATED CATARACT, LEFT EYE: ICD-10-CM

## 2025-03-06 NOTE — PROGRESS NOTES
Preoperative Evaluation  [facility] INTERNAL MEDICINE [address]  4TH FLOOR  [facility] [address]  Phone: [phone]  Fax: [phone]  Primary Provider: Shay Kirkpatrick MD  Pre-op Performing Provider: Jennie Newman NP  Mar 6, 2025             3/3/2025   Surgical Information   What procedure is being done? Cataract surgery   Facility or Hospital where procedure/surgery will be performed: [facility]   Who is doing the procedure / surgery? Dr [email or web address]   Date of surgery / procedure: 3/14/25   Time of surgery / procedure: Tenative time of 08:30   Where do you plan to recover after surgery? at home with family     Fax number for surgical facility: Note does not need to be faxed, will be available electronically in Epic.    Assessment & Plan     The proposed surgical procedure is considered LOW risk.    Preop general physical exam  Combined form of age-related cataract, left eye  Preoperative visit completed today without abnormal exam findings. He does have diabetes, will plan to hold medications as listed below. He completes GLP1 injections on Friday afternoons, will administer usual medication after his cataract surgery that day. Hypertension well managed on current regimen, ok to hold lasix on day of procedure. CKD stable, creatinine 1.89 with eGFR 40 on 2/28/25. Anemia and mild thrombocytopenia. Platelets stable at 101 on 2/28/25. Hemoglobin decreased to 9.7 on 2/28/25, plan for repeat in 2-3 months per transplant team. No need for further evaluation prior to cataract surgery.         Possible Sleep Apnea: has RONNIE, uses CPAP {      - No identified additional risk factors other than previously addressed    Preoperative Medication Instructions  Antiplatelet or Anticoagulation Medication Instructions   - We reviewed the medication list and the patient is not on an antiplatelet or anticoagulation medications.   - Bleeding risk is low for this procedure (e.g. dental,  skin, cataract).    Additional Medication Instructions   - Diuretics (furosemide, hydrochlorothiazide, chlorothalidone): DO NOT TAKE on the day of surgery.   - short acting insulin (e.g. regular, lispro, aspart): DO NOT TAKE on the morning of surgery.    - SGLT2 Inhibitor (canagliflozin, dapagliflozin, or empagliflozin): DO NOT TAKE 3 days before surgery.    - GLP-1 Injectable (exenitide, liraglutide, semaglutide, dulaglutide, etc.): DO NOT TAKE 7 days before surgery    - Insulin pump: Continue basal rate of insulin up until the time of surgery.   - Topicals: DO NOT TAKE day of surgery.    Recommendation  Approval given to proceed with proposed procedure, without further diagnostic evaluation.    Subjective   Camacho is a 59 year old, presenting for the following:  Pre-Op Exam (Pt here for pre-op exam; left eye cataract surgery 3/14/2025)          3/6/2025     7:00 AM   Additional Questions   Roomed by Eli TELLO     HPI:       Camacho Bhagat presents to the clinic today for a preoperative visit. He has a history of hepatocellular s/p liver transplant, hypertension, type 2 DM, CKD stage 3b, pancytopenia, CMV colitis, fibromyalgia, RA, sicca syndrome, sleep apnea, and cataracts.     Plan for left eye cataract surgery on 3/14/25. Had the right eye completed last year.         3/3/2025   Pre-Op Questionnaire   Have you ever had a heart attack or stroke? No   Have you ever had surgery on your heart or blood vessels, such as a stent placement, a coronary artery bypass, or surgery on an artery in your head, neck, heart, or legs? No   Do you have chest pain with activity? No   Do you have a history of heart failure? No   Do you currently have a cold, bronchitis or symptoms of other infection? No   Do you have a cough, shortness of breath, or wheezing? No   Do you or anyone in your family have previous history of blood clots? No   Do you or does anyone in your family have a serious bleeding problem such as prolonged bleeding  following surgeries or cuts? No   Have you ever had problems with anemia or been told to take iron pills? (!) YES    Have you had any abnormal blood loss such as black, tarry or bloody stools? No   Have you ever had a blood transfusion? (!) YES   Have you ever had a transfusion reaction? No   Are you willing to have a blood transfusion if it is medically needed before, during, or after your surgery? Yes   Have you or any of your relatives ever had problems with anesthesia? No   Do you have sleep apnea, excessive snoring or daytime drowsiness? (!) YES   Do you have a CPAP machine? Yes   Do you have any artifical heart valves or other implanted medical devices like a pacemaker, defibrillator, or continuous glucose monitor? No   Do you have artificial joints? No   Are you allergic to latex? No     Health Care Directive  Patient does not have a Health Care Directive: Discussed advance care planning with patient; however, patient declined at this time.    Preoperative Review of    reviewed - controlled substances reflected in medication list.      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 macular edema, with long-term current use of insulin (H) 02/26/2025     Priority: Medium    Combined form of age-related cataract, left eye 02/26/2025     Priority: Medium    Chronic midline low back pain without sciatica 02/05/2025     Priority: Medium    Greater trochanteric bursitis of both hips 02/05/2025     Priority: Medium    Lumbar spondylosis 02/05/2025     Priority: Medium    Combined forms of age-related cataract of both eyes 12/27/2023     Priority: Medium    F11.9 - Chronic, continuous use of opioids 05/15/2023     Priority: Medium    Class 3 severe obesity with serious comorbidity and body mass index (BMI) of 40.0 to 44.9 in adult (H) 12/03/2021     Priority: Medium    Chronic kidney disease, stage 3 (H) 12/13/2020      Priority: Medium    Hypertension secondary to other renal disorders 05/14/2019     Priority: Medium    Type 2 diabetes mellitus with diabetic polyneuropathy, with long-term current use of insulin (H) 09/10/2018     Priority: Medium    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   Plan:  Increase tacrolimus dose, currently on low dose tacrolimus with level < 3.0.  Plan to increase for goal = 8.    Also on myfortic 720mg Q 12 hours.   Prednisone 2.5mg q day.          Abscess, intra-abdominal, postoperative (H) 02/13/2018     Priority: Medium    Elevated serum creatinine 10/16/2017     Priority: Medium    Pancytopenia (H) 08/25/2017     Priority: Medium    Ischemia of both lower extremities 08/25/2017     Priority: Medium     Distal ischemia due to shock/high pressor requirements      S/P liver transplant (H) 07/26/2017     Priority: Medium    Neutropenic colitis 07/04/2017     Priority: Medium    Immunosuppressed status 03/10/2017     Priority: Medium    Liver transplant recipient (H) 03/04/2017     Priority: Medium    Hyperkalemia 02/14/2017     Priority: Medium    Hepatocellular carcinoma (H) 01/27/2016     Priority: Medium    Osteoarthritis 01/18/2015     Priority: Medium    Rheumatoid arthritis (H) 01/18/2015     Priority: Medium    Sicca syndrome 08/15/2011     Priority: Medium    Fibromyalgia 01/2009     Priority: Medium     dx with Dr Benitez( Rheum)      Testicular hypofunction      Priority: Medium     Problem list name updated by automated process. Provider to review      Carpal tunnel syndrome 07/08/2002     Priority: Medium      Past Medical History:   Diagnosis Date    Cancer (H) 12/15    Stage 4 liver cancer    Diabetes type 2, controlled (H) 11/10/2016    Esophageal reflux     Fibromyalgia 01/2009    dx with Dr Benitez( Rheum)    Gangrene of finger (H) 08/25/2017    H/O deep venous thrombosis  11/2001    Permanent IVC filter in place.    H/O CASTAÑEDA (nonalcoholic steatohepatitis)     H/O Pneumonia, organism unspecified(486) 10/2001    Included ARDS, sepsis, and  acute renal failure; hospitalized, required tracheostomy placement.    H/O: HTN (hypertension) 11/2001    No longer prescribed antihypertensive medication.      History of hepatocellular carcinoma     History of liver transplant (H)     History of obstructive sleep apnea     No longer wears CPAP since losing approximately 200 pounds with his liver transplant and its complications.      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    Osteoarthritis     Presence of PERMANENT IVC filter     Rheumatoid arthritis(714.0)     remission for many years    Squamous cell skin cancer     on back     Past Surgical History:   Procedure Laterality Date    ARTHROSCOPY KNEE WITH MENISCAL REPAIR Right 2008    Right knee arthroscopy    BENCH LIVER N/A 03/04/2017    Procedure: BENCH LIVER;  Surgeon: Jovan Tran MD;  Location: [facility]    BIOPSY  5/2017    Liver    CHOLECYSTECTOMY      COLONOSCOPY N/A 07/21/2017    Procedure: COMBINED COLONOSCOPY, SINGLE OR MULTIPLE BIOPSY/POLYPECTOMY BY BIOPSY;  Colonoscopy;  Surgeon: Izaiah Montes MD;  [facility]    COLONOSCOPY N/A 10/24/2022    Procedure: COLONOSCOPY, WITH POLYPECTOMY AND BIOPSY;  Surgeon: Abril Mcneill MD;  Location: [facility]    ENDOSCOPIC RETROGRADE CHOLANGIOPANCREATOGRAM N/A 05/22/2018    Procedure: COMBINED ENDOSCOPIC RETROGRADE CHOLANGIOPANCREATOGRAPHY, PLACE TUBE/STENT;  Endoscopic Retrograde Cholangiopancreatography with sphincterotomy and pancreatic duct stent placement;  Surgeon: Zay Gaitan MD;  Location: [facility]    ENT SURGERY      Repair of deviated septum    ESOPHAGOSCOPY, GASTROSCOPY, DUODENOSCOPY  (EGD), COMBINED N/A 08/04/2016    Procedure: COMBINED ESOPHAGOSCOPY, GASTROSCOPY, DUODENOSCOPY (EGD), BIOPSY SINGLE OR MULTIPLE;  Surgeon: Trent Pederson MD;  Location: [facility]    ESOPHAGOSCOPY, GASTROSCOPY, DUODENOSCOPY (EGD), COMBINED N/A 08/27/2017    Procedure: COMBINED ESOPHAGOSCOPY, GASTROSCOPY, DUODENOSCOPY (EGD);;  Surgeon: Los Wynn MD;  Location: [facility]    ESOPHAGOSCOPY, GASTROSCOPY, DUODENOSCOPY (EGD), COMBINED N/A 08/17/2023    Procedure: Esophagoscopy, gastroscopy, duodenoscopy (EGD), combined;  Surgeon: Trent Villanueva MD;  Location: [facility]    INCISION AND DRAINAGE ABDOMEN WASHOUT, COMBINED Right 02/14/2018    Procedure: COMBINED INCISION AND DRAINAGE ABDOMEN WASHOUT;  COMBINED INCISION AND DRAINAGE right ABDOMEN flank;  Surgeon: Sara Dinh MD;  [facility]    LAPAROTOMY EXPLORATORY N/A 07/04/2017    Procedure: LAPAROTOMY EXPLORATORY;  Exploratory Laparotomy, washout;  Surgeon: Tip Zhang MD;  [facility]    LAPAROTOMY EXPLORATORY N/A 07/05/2017    Procedure: LAPAROTOMY EXPLORATORY;  Exploratory Laparotomy, Washout with closure.;  Surgeon: Tip Zhang MD;  [facility]    LAPAROTOMY EXPLORATORY N/A 07/26/2017    Procedure: LAPAROTOMY EXPLORATORY;  Exploratory Laparotomy, Right angelique-colectomy, end ileostomy, mucosal fistula, partial omentectomy;  Surgeon: Sara Dinh MD;  [facility]: [facility]    LASIK      ORTHOPEDIC SURGERY Right     Repair of dislocated wrist.      PHACOEMULSIFICATION CLEAR CORNEA WITH STANDARD INTRAOCULAR LENS IMPLANT Right 2/1/2024    Procedure: RIGHT EYE PHACOEMULSIFICATION, COMPLEX CATARACT, WITH INTRAOCULAR LENS IMPLANT;  Surgeon: Stan Roque MD;  Location: [facility]    RELEASE TRIGGER FINGER Right 11/10/2017    Procedure: RELEASE TRIGGER FINGER;  Debride, V-Y Flap Right Index Finger;  Surgeon: Momo Noonan MD;  [facility]    TAKEDOWN ILEOSTOMY N/A 01/05/2018    Procedure: TAKEDOWN  ILEOSTOMY;  Exploratory Laparotomy, Lysis of Adhesions, Takedown Of End Ileostomy, Takedown of mucocutaneous fistula, ileocolic resection  And Colorectal Anastomosis, Primary repair of Ventral Hernia, Anesthesia Block ;  Surgeon: Sara Dinh MD;  Location: [facility]    TRACHEOSTOMY  2001    During hospitalization for pneumonia.      TRANSPLANT LIVER RECIPIENT  DONOR N/A 2017    Procedure: TRANSPLANT LIVER RECIPIENT  DONOR;  Surgeon: Jovan Tran MD;  Location: [facility]     Current Outpatient Medications   Medication Sig Dispense Refill    alcohol swab prep pads Use to swab area of injection/francisca as directed. 100 each 3    alpha-lipoic acid 600 MG capsule Take 600 mg by mouth      amLODIPine (NORVASC) 10 MG tablet Take 1 tablet (10 mg) by mouth daily. 90 tablet 3    augmented betamethasone dipropionate (DIPROLENE-AF) 0.05 % external ointment Apply twice daily as needed for rash on the legs 45 g 11    cholecalciferol (VITAMIN D3) 1000 UNIT tablet Take 1 tablet (1,000 Units) by mouth daily 100 tablet 3    Continuous Glucose Sensor (DEXCOM G6 SENSOR) MISC Change every 10 days. 9 each 3    Continuous Glucose Transmitter (DEXCOM G6 TRANSMITTER) MISC Change every 3 months. 1 each 3    cyclobenzaprine (FLEXERIL) 10 MG tablet Take 1 tablet (10 mg) by mouth 3 times daily as needed for muscle spasms 90 tablet 3    doxazosin (CARDURA) 8 MG tablet Take 1 tablet (8 mg) by mouth at bedtime. Take a total of 10 mg at bedtime 90 tablet 3    doxycycline hyclate (VIBRAMYCIN) 100 MG capsule Take 1 capsule (100 mg) by mouth 2 times daily. 10 capsule 0    empagliflozin (JARDIANCE) 10 MG TABS tablet Take 1 tablet (10 mg) by mouth daily. 90 tablet 1    fluticasone (FLONASE) 50 MCG/ACT nasal spray Spray 1 spray into both nostrils daily 20 mL 3    furosemide (LASIX) 40 MG tablet Take 40 mg daily and OK to take an extra for LE edema. 180 tablet 3    gabapentin (NEURONTIN) 800 MG tablet Take 1 tablet (800  mg) by mouth 3 times daily. 90 tablet 1    hydrALAZINE (APRESOLINE) 50 MG tablet Take 1 tablet (50 mg) by mouth 2 times daily. 180 tablet 3    hydrOXYzine (ATARAX) 25 MG tablet Take 1 tablet (25 mg) by mouth 3 times daily as needed for itching 90 tablet 3    Insulin Disposable Pump (OMNIPOD 5 G6 POD, GEN 5,) MISC 1 each See Admin Instructions Use per 's instructions. 1 each 1    Insulin Disposable Pump (OMNIPOD 5 PODS, GEN 5,) MISC 1 each See Admin Instructions. Change every 2 days. Use for insulin administration. 45 each 3    Insulin Lispro-aabc (LYUMJEV KWIKPEN) 200 UNIT/ML SOPN 1 Units by Other route See Admin Instructions. Via insulin pump. Approx 170 units daily plus 2-3 units daily to prime pump and tubing. 170 mL 3    insulin pen needle (BD ENE U/F) 32G X 4 MM miscellaneous Use 1 pen needle 4 times daily or as directed. 400 each 1    lidocaine (LIDODERM) 5 % patch Place 1 patch onto the skin every 24 hours To prevent lidocaine toxicity, patient should be patch free for 12 hrs daily. 30 patch 11    lidocaine (XYLOCAINE) 5 % external ointment Apply topically as needed for moderate pain 90 g 11    losartan (COZAAR) 25 MG tablet Take 1 tablet (25 mg) by mouth daily 90 tablet 3    methocarbamol (ROBAXIN) 500 MG tablet Take 1 tablet (500 mg) by mouth 2 times daily as needed for muscle spasms. 60 tablet 11    metoprolol succinate ER (TOPROL XL) 200 MG 24 hr tablet Take 1 tablet (200 mg) by mouth daily. 90 tablet 1    MOUNJARO 7.5 MG/0.5ML SOAJ 7.5 mg daily.      mycophenolic acid (GENERIC EQUIVALENT) 360 MG EC tablet Take 2 tablets (720 mg) by mouth every 12 hours. 360 tablet 3    oxyCODONE (ROXICODONE) 5 MG tablet Take 1 tablet (5 mg) by mouth 4 times daily as needed for pain. maximum 6 tablet(s) per day 30 tablet 0    predniSONE (DELTASONE) 2.5 MG tablet Take 1 tablet (2.5 mg) by mouth daily. 90 tablet 3    tacrolimus (GENERIC EQUIVALENT) 1 MG capsule Take 4 capsules (4 mg) by mouth every morning AND  3 capsules (3 mg) every evening. 630 capsule 3    Tacrolimus 1 MG PACK       Tirzepatide 7.5 MG/0.5ML SOAJ Inject 0.5 mLs (7.5 mg) subcutaneously once a week. 2 mL 1    tretinoin (RETIN-A) 0.025 % external cream Apply a pea-sized amount evenly over the face at nighttime before bed 45 g 11    triamcinolone (KENALOG) 0.1 % external ointment Apply topically 2 times daily to the itching on the legs 80 g 1    ursodiol (ACTIGALL) 500 MG tablet Take 1 tablet (500 mg) by mouth 2 times daily 180 tablet 3       Allergies   Allergen Reactions    Erythromycin GI Disturbance    Losartan Other (See Comments)     Consistently develops hyperkalemia    Vioxx      Nausea, vomiting        Social History     Tobacco Use    Smoking status: Former     Current packs/day: 0.00     Average packs/day: 0.5 packs/day for 1 year (0.5 ttl pk-yrs)     Types: Cigarettes, Cigars     Start date: 1987     Quit date: 1987     Years since quittin.7    Smokeless tobacco: Former     Types: Chew     Quit date: 10/8/2015    Tobacco comments:     1 Cigar once every other month.   Substance Use Topics    Alcohol use: Not Currently     Comment: No alcohol since liver transplant.         History   Drug Use Unknown           Review of Systems  Constitutional, HEENT, cardiovascular, pulmonary, gi and gu systems are negative, except as otherwise noted.    Objective    /74 (BP Location: Right arm, Patient Position: Sitting, Cuff Size: Adult Large)   Pulse 82   Temp 98.2  F (36.8  C) (Oral)   Resp 16   Ht 1.829 m (6')   Wt 119.6 kg (263 lb 9.6 oz)   SpO2 96%   BMI 35.75 kg/m     Estimated body mass index is 35.75 kg/m  as calculated from the following:    Height as of this encounter: 1.829 m (6').    Weight as of this encounter: 119.6 kg (263 lb 9.6 oz).  Physical Exam  GENERAL: alert and no distress  EYES: Eyes grossly normal to inspection, PERRL and conjunctivae and sclerae normal  HENT: ear canals and TM's normal, nose and mouth without  ulcers or lesions  NECK: no adenopathy, no asymmetry, masses, or scars  RESP: lungs clear to auscultation - no rales, rhonchi or wheezes  CV: regular rate and rhythm, normal S1 S2, no S3 or S4, no murmur, click or rub, no peripheral edema  ABDOMEN: soft, nontender, no hepatosplenomegaly, no masses and bowel sounds normal  MS: no gross musculoskeletal defects noted, no edema  NEURO: Normal strength and tone, mentation intact and speech normal  PSYCH: mentation appears normal, affect normal/bright    Recent Labs   Lab Test 02/28/25  1650 11/01/24  0723 07/08/24  1647 07/05/24  1703   HGB 9.7* 10.7*   < > 10.8*   * 116*   < >  --     137   < > 133*   POTASSIUM 4.6 5.1   < > 4.5   CR 1.89* 1.94*   < > 2.20*   A1C 5.0  --   --  6.2*    < > = values in this interval not displayed.        Diagnostics  No labs were ordered during this visit.   No EKG required for low risk surgery (cataract, skin procedure, breast biopsy, etc).    Revised Cardiac Risk Index (RCRI)  The patient has the following serious cardiovascular risks for perioperative complications:   - No serious cardiac risks = 0 points     RCRI Interpretation: 0 points: Class I (very low risk - 0.4% complication rate)         Signed Electronically by: Jennie Newman NP  A copy of this evaluation report is provided to the requesting physician.

## 2025-03-06 NOTE — PATIENT INSTRUCTIONS
How to Take Your Medication Before Surgery  Preoperative Medication Instructions   Antiplatelet or Anticoagulation Medication Instructions   - We reviewed the medication list and the patient is not on an antiplatelet or anticoagulation medications.   - Bleeding risk is low for this procedure (e.g. dental, skin, cataract).    Additional Medication Instructions   - Diuretics (furosemide, hydrochlorothiazide, chlorothalidone): DO NOT TAKE on the day of surgery.   - short acting insulin (e.g. regular, lispro, aspart): DO NOT TAKE on the morning of surgery.    - SGLT2 Inhibitor (canagliflozin, dapagliflozin, or empagliflozin): DO NOT TAKE 3 days before surgery.    - GLP-1 Injectable (exenitide, liraglutide, semaglutide, dulaglutide, etc.): DO NOT TAKE 7 days before surgery    - Insulin pump: Continue basal rate of insulin up until the time of surgery.   - Topicals: DO NOT TAKE day of surgery.       Patient Education   Preparing for Your Surgery  For Adults  Getting started  In most cases, a nurse will call to review your health history and instructions. They will give you an arrival time based on your scheduled surgery time. Please be ready to share:  Your doctor's clinic name and phone number  Your medical, surgical, and anesthesia history  A list of allergies and sensitivities  A list of medicines, including herbal treatments and over-the-counter drugs  Whether the patient has a legal guardian (ask how to send us the papers in advance)  Note: You may not receive a call if you were seen at our PAC (Preoperative Assessment Center).  Please tell us if you're pregnant--or if there's any chance you might be pregnant. Some surgeries may injure a fetus (unborn baby), so they require a pregnancy test. Surgeries that are safe for a fetus don't always need a test, and you can choose whether to have one.   Preparing for surgery  Within 10 to 30 days of surgery: Have a pre-op exam (sometimes called an H&P, or History and  Physical). This can be done at a clinic or pre-operative center.  If you're having a , you may not need this exam. Talk to your care team.  At your pre-op exam, talk to your care team about all medicines you take. (This includes CBD oil and any drugs, such as THC, marijuana, and other forms of cannabis.) If you need to stop any medicine before surgery, ask when to start taking it again.  This is for your safety. Many medicines and drugs can make you bleed too much during surgery. Some change how well surgery (anesthesia) drugs work.  Call your insurance company to let them know you're having surgery. (If you don't have insurance, call [phone].)  Call your clinic if there's any change in your health. This includes a scrape or scratch near the surgery site, or any signs of a cold (sore throat, runny nose, cough, rash, fever).  Eating and drinking guidelines  For your safety: Unless your surgeon tells you otherwise, follow the guidelines below.  Eat and drink as normal until 8 hours before you arrive for surgery. After that, no food or milk. You can spit out gum when you arrive.  Drink clear liquids until 2 hours before you arrive. These are liquids you can see through, like water, Gatorade, and Propel Water. They also include plain black coffee and tea (no cream or milk).  No alcohol for 24 hours before you arrive. The night before surgery, stop any drinks that contain THC.  If your care team tells you to take medicine on the morning of surgery, it's okay to take it with a sip of water. No other medicines or drugs are allowed (including CBD oil)--follow your care team's instructions.  If you have questions the day of surgery, call your hospital or surgery center.   Preventing infection  Shower or bathe the night before and the morning of surgery. Follow the instructions your clinic gave you. (If no instructions, use regular soap.)  Don't shave or clip hair near your surgery site. We'll remove the hair if  needed.  Don't smoke or vape the morning of surgery. No chewing tobacco for 6 hours before you arrive. A nicotine patch is okay. You may spit out nicotine gum when you arrive.  For some surgeries, the surgeon will tell you to fully quit smoking and nicotine.  We will make every effort to keep you safe from infection. We will:  Clean our hands often with soap and water (or an alcohol-based hand rub).  Clean the skin at your surgery site with a special soap that kills germs.  Give you a special gown to keep you warm. (Cold raises the risk of infection.)  Wear hair covers, masks, gowns, and gloves during surgery.  Give antibiotic medicine, if prescribed. Not all surgeries need this medicine.  What to bring on the day of surgery  Photo ID and insurance card  Copy of your health care directive, if you have one  Glasses and hearing aids (bring cases)  You can't wear contacts during surgery  Inhaler and eye drops, if you use them (tell us about these when you arrive)  CPAP machine or breathing device, if you use them  A few personal items, if spending the night  If you have . . .  A pacemaker, ICD (cardiac defibrillator), or other implant: Bring the ID card.  An implanted stimulator: Bring the remote control.  A legal guardian: Bring a copy of the certified (court-stamped) guardianship papers.  Please remove any jewelry, including body piercings. Leave jewelry and other valuables at home.  If you're going home the day of surgery  You must have a responsible adult drive you home. They should stay with you overnight as well.  If you don't have someone to stay with you, and you aren't safe to go home alone, we may keep you overnight. Insurance often won't pay for this.  After surgery  If it's hard to control your pain or you need more pain medicine, please call your surgeon's office.  Questions?   If you have any questions for your care team, list them here:    ____________________________________________________________________________________________________________________________________________________________________________________________________________________________________________________________  For informational purposes only. Not to replace the advice of your health care provider. Copyright   2003, 2019 [facility]. All rights reserved. Clinically reviewed by Jacobo Harris MD. [employer] 205619 - REV 08/24.

## 2025-03-10 NOTE — PROGRESS NOTES
03/10/25 10:25 AM  PATIENT LAB/IMAGING STATUS : Orders completed / No further action needed   Patient is showing 4/5 MNCM met. BP out range

## 2025-03-11 ENCOUNTER — TELEPHONE (OUTPATIENT)
Dept: OPHTHALMOLOGY | Facility: CLINIC | Age: 60
End: 2025-03-11

## 2025-03-11 ENCOUNTER — PREP FOR PROCEDURE (OUTPATIENT)
Dept: OPHTHALMOLOGY | Facility: CLINIC | Age: 60
End: 2025-03-11

## 2025-03-11 NOTE — TELEPHONE ENCOUNTER
Follow up call to patient confirming that local only was switch for anesthesia type per providers approval . Patient confirmed he understood .         Cristiano Haines on 3/11/2025 at 12:59 PM

## 2025-03-11 NOTE — TELEPHONE ENCOUNTER
Phone call to patient about anesthesia type due to taking Mounjaro injection . Writer did explain that surgery can be reschedule to a later date or patient procedure can be local only . Patient stated that he can not reschedule to next available which is 4/3/25. Patient stated he is okay with local only . Writer did explain that a message will be sent to provider and a follow up call will be made .        Cristiano Haines on 3/11/2025 at 12:14 PM

## 2025-03-12 ENCOUNTER — ANCILLARY PROCEDURE (OUTPATIENT)
Dept: MRI IMAGING | Facility: CLINIC | Age: 60
End: 2025-03-12
Attending: PHYSICIAN ASSISTANT
Payer: COMMERCIAL

## 2025-03-12 DIAGNOSIS — G89.29 CHRONIC MIDLINE LOW BACK PAIN WITHOUT SCIATICA: ICD-10-CM

## 2025-03-12 DIAGNOSIS — M47.816 LUMBAR SPONDYLOSIS: ICD-10-CM

## 2025-03-12 DIAGNOSIS — M54.6 CHRONIC BILATERAL THORACIC BACK PAIN: ICD-10-CM

## 2025-03-12 DIAGNOSIS — M54.50 CHRONIC MIDLINE LOW BACK PAIN WITHOUT SCIATICA: ICD-10-CM

## 2025-03-12 DIAGNOSIS — M70.61 GREATER TROCHANTERIC BURSITIS OF BOTH HIPS: ICD-10-CM

## 2025-03-12 DIAGNOSIS — M70.62 GREATER TROCHANTERIC BURSITIS OF BOTH HIPS: ICD-10-CM

## 2025-03-12 DIAGNOSIS — G89.29 CHRONIC BILATERAL THORACIC BACK PAIN: ICD-10-CM

## 2025-03-12 PROCEDURE — 72146 MRI CHEST SPINE W/O DYE: CPT | Performed by: RADIOLOGY

## 2025-03-12 PROCEDURE — 72148 MRI LUMBAR SPINE W/O DYE: CPT | Performed by: RADIOLOGY

## 2025-03-14 ENCOUNTER — HOSPITAL ENCOUNTER (OUTPATIENT)
Facility: AMBULATORY SURGERY CENTER | Age: 60
Discharge: HOME | End: 2025-03-14
Attending: OPHTHALMOLOGY
Payer: COMMERCIAL

## 2025-03-14 VITALS
SYSTOLIC BLOOD PRESSURE: 147 MMHG | DIASTOLIC BLOOD PRESSURE: 76 MMHG | TEMPERATURE: 97.1 F | BODY MASS INDEX: 35.21 KG/M2 | RESPIRATION RATE: 16 BRPM | OXYGEN SATURATION: 98 % | HEART RATE: 68 BPM | WEIGHT: 260 LBS | HEIGHT: 72 IN

## 2025-03-14 DIAGNOSIS — H25.812 COMBINED FORM OF AGE-RELATED CATARACT, LEFT EYE: Primary | ICD-10-CM

## 2025-03-14 PROCEDURE — 67028 INJECTION EYE DRUG: CPT | Mod: XU | Performed by: OPHTHALMOLOGY

## 2025-03-14 PROCEDURE — 66984 XCAPSL CTRC RMVL W/O ECP: CPT | Mod: LT | Performed by: OPHTHALMOLOGY

## 2025-03-14 DEVICE — LENS CC60WF 22.0 CLAREON UV ASPHERIC BICONVEX IOL: Type: IMPLANTABLE DEVICE | Site: EYE | Status: FUNCTIONAL

## 2025-03-14 RX ORDER — SODIUM CHLORIDE, SODIUM LACTATE, POTASSIUM CHLORIDE, CALCIUM CHLORIDE 600; 310; 30; 20 MG/100ML; MG/100ML; MG/100ML; MG/100ML
INJECTION, SOLUTION INTRAVENOUS CONTINUOUS
Status: DISCONTINUED | OUTPATIENT
Start: 2025-03-14 | End: 2025-03-15 | Stop reason: HOSPADM

## 2025-03-14 RX ORDER — TIMOLOL MALEATE 5 MG/ML
SOLUTION/ DROPS OPHTHALMIC PRN
Status: DISCONTINUED | OUTPATIENT
Start: 2025-03-14 | End: 2025-03-14 | Stop reason: HOSPADM

## 2025-03-14 RX ORDER — DICLOFENAC SODIUM 1 MG/ML
1 SOLUTION/ DROPS OPHTHALMIC
Status: COMPLETED | OUTPATIENT
Start: 2025-03-14 | End: 2025-03-14

## 2025-03-14 RX ORDER — TETRACAINE HYDROCHLORIDE 5 MG/ML
SOLUTION OPHTHALMIC PRN
Status: DISCONTINUED | OUTPATIENT
Start: 2025-03-14 | End: 2025-03-14 | Stop reason: HOSPADM

## 2025-03-14 RX ORDER — BALANCED SALT SOLUTION 6.4; .75; .48; .3; 3.9; 1.7 MG/ML; MG/ML; MG/ML; MG/ML; MG/ML; MG/ML
SOLUTION OPHTHALMIC PRN
Status: DISCONTINUED | OUTPATIENT
Start: 2025-03-14 | End: 2025-03-14 | Stop reason: HOSPADM

## 2025-03-14 RX ORDER — PROPARACAINE HYDROCHLORIDE 5 MG/ML
1 SOLUTION/ DROPS OPHTHALMIC
Status: DISCONTINUED | OUTPATIENT
Start: 2025-03-14 | End: 2025-03-14 | Stop reason: HOSPADM

## 2025-03-14 RX ORDER — CYCLOPENTOLAT/TROPIC/PHENYLEPH 1%-1%-2.5%
1 DROPS (EA) OPHTHALMIC (EYE)
Status: COMPLETED | OUTPATIENT
Start: 2025-03-14 | End: 2025-03-14

## 2025-03-14 RX ORDER — MOXIFLOXACIN IN NACL,ISO-OS/PF 0.3MG/0.3
SYRINGE (ML) INTRAOCULAR PRN
Status: DISCONTINUED | OUTPATIENT
Start: 2025-03-14 | End: 2025-03-14 | Stop reason: HOSPADM

## 2025-03-14 RX ORDER — KETOROLAC TROMETHAMINE 5 MG/ML
1 SOLUTION OPHTHALMIC 4 TIMES DAILY
Qty: 10 ML | Refills: 0 | Status: SHIPPED | OUTPATIENT
Start: 2025-03-14

## 2025-03-14 RX ORDER — MOXIFLOXACIN 5 MG/ML
1 SOLUTION/ DROPS OPHTHALMIC
Status: COMPLETED | OUTPATIENT
Start: 2025-03-14 | End: 2025-03-14

## 2025-03-14 RX ORDER — LIDOCAINE HYDROCHLORIDE 10 MG/ML
INJECTION, SOLUTION EPIDURAL; INFILTRATION; INTRACAUDAL; PERINEURAL PRN
Status: DISCONTINUED | OUTPATIENT
Start: 2025-03-14 | End: 2025-03-14 | Stop reason: HOSPADM

## 2025-03-14 RX ORDER — SODIUM CHLORIDE, SODIUM LACTATE, POTASSIUM CHLORIDE, CALCIUM CHLORIDE 600; 310; 30; 20 MG/100ML; MG/100ML; MG/100ML; MG/100ML
INJECTION, SOLUTION INTRAVENOUS CONTINUOUS
Status: DISCONTINUED | OUTPATIENT
Start: 2025-03-14 | End: 2025-03-14 | Stop reason: HOSPADM

## 2025-03-14 RX ORDER — PREDNISOLONE ACETATE 10 MG/ML
1-2 SUSPENSION/ DROPS OPHTHALMIC 4 TIMES DAILY
Qty: 10 ML | Refills: 0 | Status: SHIPPED | OUTPATIENT
Start: 2025-03-14

## 2025-03-14 RX ORDER — MOXIFLOXACIN 5 MG/ML
1 SOLUTION/ DROPS OPHTHALMIC 4 TIMES DAILY
Qty: 3 ML | Refills: 0 | Status: SHIPPED | OUTPATIENT
Start: 2025-03-14

## 2025-03-14 RX ORDER — LIDOCAINE 40 MG/G
CREAM TOPICAL
Status: DISCONTINUED | OUTPATIENT
Start: 2025-03-14 | End: 2025-03-14 | Stop reason: HOSPADM

## 2025-03-14 RX ADMIN — MOXIFLOXACIN 1 DROP: 5 SOLUTION/ DROPS OPHTHALMIC at 09:26

## 2025-03-14 RX ADMIN — Medication 1 DROP: at 09:44

## 2025-03-14 RX ADMIN — DICLOFENAC SODIUM 1 DROP: 1 SOLUTION/ DROPS OPHTHALMIC at 09:26

## 2025-03-14 RX ADMIN — DICLOFENAC SODIUM 1 DROP: 1 SOLUTION/ DROPS OPHTHALMIC at 09:38

## 2025-03-14 RX ADMIN — Medication 1 DROP: at 09:38

## 2025-03-14 RX ADMIN — DICLOFENAC SODIUM 1 DROP: 1 SOLUTION/ DROPS OPHTHALMIC at 09:44

## 2025-03-14 RX ADMIN — MOXIFLOXACIN 1 DROP: 5 SOLUTION/ DROPS OPHTHALMIC at 09:44

## 2025-03-14 RX ADMIN — PROPARACAINE HYDROCHLORIDE 1 DROP: 5 SOLUTION/ DROPS OPHTHALMIC at 09:26

## 2025-03-14 RX ADMIN — Medication 1 DROP: at 09:26

## 2025-03-14 RX ADMIN — MOXIFLOXACIN 1 DROP: 5 SOLUTION/ DROPS OPHTHALMIC at 09:38

## 2025-03-14 NOTE — DISCHARGE INSTRUCTIONS
[facility]  Home Care Following Anesthesia  For 24 hours after surgery:  Get plenty of rest.  A responsible adult must stay with you for at least 24 hours after you leave the surgery center.  Do not drive or use heavy equipment.  If you have weakness or tingling, don't drive or use heavy equipment until this feeling goes away.   Do not drink alcohol.   Avoid strenuous or risky activities.  Ask for help when climbing stairs.  You may feel lightheaded.  IF so, sit for a few minutes before standing.  Have someone help you get up.   If you have nausea (feel sick to your stomach): Drink only clear liquids such as apple juice, ginger ale, broth or 7-Up.  Rest may also help.  Be sure to drink enough fluids.  Move to a regular diet as you feel able.   You may have a slight fever.  Call the doctor if your fever is over 100 F (37.7 C) (taken under the tongue) or lasts longer than 24 hours.  You may have a dry mouth, a sore throat, muscle aches or trouble sleeping. These should go away after 24 hours.  Do not make important or legal decisions.   It is recommended to avoid smoking.               Tips for taking pain medications  To get the best pain relief possible, remember these points:  Take pain medications as directed, before pain becomes severe.  Pain medication can upset your stomach: taking it with food may help.  Constipation is a common side effect of pain medication. Drink plenty of  fluids.  Eat foods high in fiber. Take a stool softener if recommended by your doctor or pharmacist.  Do not drink alcohol, drive or operate machinery while taking pain medications.  Ask about other ways to control pain, such as with heat, ice or relaxation.    Tylenol/Acetaminophen Consumption    If you feel your pain relief is insufficient, you may take Tylenol/Acetaminophen in addition to your narcotic pain medication.   Be careful not to exceed 4,000 mg of Tylenol/Acetaminophen in a 24 hour  period from all sources.  If you are taking extra strength Tylenol/acetaminophen (500 mg), the maximum dose is 8 tablets in 24 hours.  If you are taking regular strength acetaminophen (325 mg), the maximum dose is 12 tablets in 24 hours.    Call a doctor for any of the following:  Signs of infection (fever, growing tenderness at the surgery site, a large amount of drainage or bleeding, severe pain, foul-smelling drainage, redness, swelling).  It has been over 8 to 10 hours since surgery and you are still not able to urinate (pass water).  Headache for over 24 hours.  Numbness, tingling or weakness the day after surgery (if you had spinal anesthesia).  Signs of Covid-19 infection (temperature over 100 degrees, shortness of breath, cough, loss of taste/smell, generalized body aches, persistent headache, chills, sore throat, nausea/vomiting/diarrhea)    Your doctor is:  Dr. Stan Roque, Ophthalmology: [phone]                    After hours and weekends call the hospital @ [phone] and ask for the resident on call for:  Ophthalmology  For emergency care, call the:  [place] [facility]:  [phone] (TTY for hearing impaired: [phone])

## 2025-03-14 NOTE — OP NOTE
PREOPERATIVE DIAGNOSIS:   1.  2. 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 Cataract extraction with intraocular lens implant Left  Left eye.  2. Intravitreal avastin injection, left eye  SURGEON: Stan Roque M.D.  ASSISTANT:   INDICATIONS: The patient Camacho Bhagat presented to the eye clinic with decreased vision secondary to cataract in the Left  Left eye. The risks, benefits and alternatives to cataract extraction were discussed. The patient elected to proceed. All questions were answered to the patient's satisfaction.   DESCRIPTION OF PROCEDURE:   Prior to the procedure, appropriate cardiac and respiratory monitors were applied to the patient.  The patient was brought to the operating room. A drop of topical tetracaine was placed in the operative eye.  With adequate anesthesia, the Left  Left eye was prepped and draped in the usual sterile fashion. A lid speculum was placed, and the operating microscope was rotated into position. A surgical pause was carried out to identify with all members of the surgical team the correct surgical site. A paracentesis was created.  Through this limbal paracentesis, the anterior chamber was filled with preservative-free lidocaine followed by epi-shugarcaine and viscoelastic.  A temporal wound was created at the limbus using a 2.6 mm blade. A capsulorrhexis was initiated using a bent 25-gauge needle and was completed in continuous and circular fashion using the capsulorrhexis forceps. The lens nucleus was hydrodissected using balanced salt solution.  The lens nucleus was rotated and removed using phacoemulsification in a stop and chop technique.  Residual cortical material was removed using irrigation-aspiration.  The capsular bag was reinflated to its maximal extent with cohesive viscoelastic.  A 22.0 diopter [id number] was  inserted into the capsular bag.  The lens power selected was reviewed using the intraocular lens power measurements that were obtained preoperatively to confirm that the correct lens was selected for the desired post-operative refractive state. The residual viscoelastic was removed in its entirety, the wounds were hydrated and found to be self-sealing.  Intracameral moxifloxacin was administered. Tactile pressure was confirmed to be in a normal range.      Calipers were used to yokasta the conjunctiva 3.5 mm posterior to the limbus. A TB syringe with a 30-gauge needle was used to inject 0.05 ml of Avastin into the vitreous.    Lot # [id number]  BUD 6/19/25     The lid speculum was removed, a drop of timolol was administered, and a shield was applied.  The patient tolerated the procedure well, and there were no complications.    PLAN: The patient will be discharged to home and will follow up today  EBL:  None  Complications:  None  Implant Name Type Inv. Item Serial No.  Lot No. LRB No. Used Action   LENS CC60WF 22.0 CLAREON UV ASPHERIC BICONVEX IOL - [id number] Lens/Eye Implant LENS CC60WF 22.0 CLAREON UV ASPHERIC BICONVEX IOL [id number] [facility]  Left 1 Implanted

## 2025-03-18 ENCOUNTER — OFFICE VISIT (OUTPATIENT)
Dept: ORTHOPEDICS | Facility: CLINIC | Age: 60
End: 2025-03-18
Payer: COMMERCIAL

## 2025-03-18 DIAGNOSIS — G58.9 NERVE DISORDER: ICD-10-CM

## 2025-03-18 DIAGNOSIS — G89.29 CHRONIC BILATERAL THORACIC BACK PAIN: Primary | ICD-10-CM

## 2025-03-18 DIAGNOSIS — M54.50 CHRONIC MIDLINE LOW BACK PAIN WITHOUT SCIATICA: ICD-10-CM

## 2025-03-18 DIAGNOSIS — G89.29 CHRONIC MIDLINE LOW BACK PAIN WITHOUT SCIATICA: ICD-10-CM

## 2025-03-18 DIAGNOSIS — M54.6 CHRONIC BILATERAL THORACIC BACK PAIN: Primary | ICD-10-CM

## 2025-03-18 PROCEDURE — 1125F AMNT PAIN NOTED PAIN PRSNT: CPT | Performed by: PHYSICIAN ASSISTANT

## 2025-03-18 PROCEDURE — 99214 OFFICE O/P EST MOD 30 MIN: CPT | Performed by: PHYSICIAN ASSISTANT

## 2025-03-18 RX ORDER — DIAZEPAM 2 MG/1
2 TABLET ORAL 2 TIMES DAILY PRN
Qty: 20 TABLET | Refills: 0 | Status: SHIPPED | OUTPATIENT
Start: 2025-03-18 | End: 2025-04-01

## 2025-03-18 NOTE — LETTER
"3/18/2025      Camacho Bhagat  [address] [facility] [address]      Dear Colleague,    Thank you for referring your patient, Camacho Bhagat, to the [facility] [place]. Please see a copy of my visit note below.    I have reviewed and updated the patient's Past Medical History, Social History, Family History and Medication List.    ALLERGIES  Erythromycin, Losartan, and Vioxx    Spine Surgery Follow Up    REFERRING PHYSICIAN: No ref. provider found   PRIMARY CARE PHYSICIAN: Shay Kirkpatrick           Chief Complaint:   RECHECK (MRI review)      History of Present Illness:  Symptom Profile Including: location of symptoms, onset, severity, exacerbating/alleviating factors, previous treatments:        Camacho Bhagat is a 59 year old male who presents today for MRI follow-up.Last seen in clinic for initial consultation 12/23/2024 for evaluation of chronic neck and back pain.  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  Plan was for physical therapy, MRI, greater trochanteric hip injections.      Today, reports continued thoracic and lumbar pain.  Pain is worst at the thoracolumbar region of his back, slightly off to the right.  Pain also present in lumbar paraspinals.  Pain is generally worse if sitting for a prolonged period or if walking for long period of time; better with stretching and standing.  Pain is divided into about 80% back pain, 20% bilateral hip pain, right equal to left. Legs intermittently \"give out\"; left generally more often than right.  Greater trochanteric injections helped with his bilateral hip pain, but this was only temporary.  Physical therapy has not been significantly helping things. He is wondering about muscle relaxers he could take, he only takes robaxin sparingly as he is concerned how it could affect his liver function.    Past treatments tried:  - " Physical therapy: Currently enrolled, had this not found it very helpful yet.    - Injections:   2/15/23 L4-5 translaminar DANNY: per patient, not as helpful as subsequent L2-3 DANNY  6/7/23 L2-3 translaminar DANNY: per patient, helped significantly, lasted 6-12 months.   9/13/22 bilateral greater trochanter injections (Soren) - helped a lot  2/3/2025 bilateral greater trochanteric hip injection (Dr. Stinson) -per patient, helped significantly for the first few days (was able to walk up and down his driveway with less difficulty), but then physical therapy made pain worse again.  - Medications: can't take NSAIDs or tylenol. Has PRN oxycodone 5mg, but takes very rarely. Has PRN methocarbamol. Takes 800mg gabapenin HS. Is on daily low dose prednisone    KEVIN Scores:  Oswestry (KEVIN) Questionnaire        3/17/2025     8:01 AM   OSWESTRY DISABILITY INDEX   Count 9    Sum 19    Oswestry Score (%) 42.22 %        Patient-reported      Neck Disability Index (NDI) Questionnaire        1/31/2025    11:12 AM   Neck Disability Index (NDI)   Neck Disability Index: Count 10   NDI: Total Score = SUM (points for all 10 findings) 10   Neck Disability in Percent = (Total Score) / 50 * 100 20 (%)        PROMIS-10 Scores:  Global Mental Health Score: (Patient-Rptd) (P) 20  Global Physical Health Score: (Patient-Rptd) (P) 14  PROMIS TOTAL - SUBSCORES: (Patient-Rptd) (P) 34         Physical Exam:    Constitutional - Patient is healthy, well-nourished and appears stated age   Respiratory - Patient is breathing normally and in no respiratory distress.   Skin - No suspicious rashes or lesions.   Psychiatric - Normal mood and affect.   Cardiovascular - Extremities warm and well perfused.   Eyes - Visual acuity is normal to the written word.   ENT - Hearing intact to the spoken word.   GI - No abdominal distention.   Musculoskeletal - Non-antalgic gait without use of assistive devices. Localizes main pain to midline and right paraspinals  thoracolumbar junction. Tender to palpation midline about T8-L3, tender to right paraspinals. See detailed exam from last visit.           Imaging:   I ordered and independently reviewed new radiographs at this clinic visit. The results were discussed with the patient.  Findings include:    3/12/2025 MRI thoracic spine without contrast: Multilevel spondylosis. T9-10 spinal cord shape change without significant stenosis.  No significant foraminal stenosis.  Per radiologist: Multiple bilateral T2 hyperintense nodules or cysts along the thoracic nerves as they course beneath the ribs. Differential could include nerve root sheath cysts and neoplastic processes such as multiple schwannomas.    3/12/2025 MRI lumbar spine without contrast: Multilevel degenerative changes, facet arthropathy L5-S1 left>right foraminal stenosis. L3-4, L4-5 moderate bilateral foraminal stenosis. no significant spinal canal stenosis              Assessment and Plan:   Assessment:  59 year old male with   Chronic axial thoracolumbar back pain  Chronic axial neck pain  Bilateral greater trochanter bursitis  Osteopenia (2024 DEXA most negative T-score -1.2)     Plan:  Reviewed MRI findings with patient which demonstates multilevel degenerative changes, some foraminal stenosis in the lumbar spine. I will order the MRI thoracic w contrast to further evaluate the multiple nerve nodules identified on imaging. For the back pain, we discussed various treatment options. He cannot take NSAIDs or tylenol. He has oxycodone available but uses very sparingly. Also has robaxin and gabapentin. He would like to try a different muscle relaxer, we looked into some options, but he would like to try valium. I can provide a one time prescription for this, but he should reach out to transplant pharmacist for consideration of alternative muscle relaxer options. He should continue with PT program. He has had a lot of success with epidurals in the past, we can try a new  order now; but would recommend referral to medical spine team for continued non-operative management, consideration of injections, possible MBB/RFA if indicated.      - one time Rx for valium to take sparingly for muscle spasms. He can work with transplant pharmacy about best muscle relaxer options   - continue PT  - Ordered MRI thoracic with and without contrast to further evaluate nerve root nodules. Will discuss results with patient.  - referral to neurosurgery to discuss above and if follow up on these is needed  - referral to Dr. Paris for comprehensive pain management.     Respectfully,  Gisselle Mancilla (christykkelly Clayton)COLETTE  Orthopaedic Spine Surgery  Dept Orthopaedic Surgery, [facility]  [facility] [place] [phone]    20 minutes spent on the date of the encounter doing chart review, review of outside records, review of test results, my own interpretation of tests, documentation, patient history, physical exam & discussion of plan with patient and family.    Dictation Disclaimer: Some of this Note has been completed with voice-recognition dictation software. Although errors are generally corrected real-time, there is the potential for a rare error to be present in the completed chart.      Again, thank you for allowing me to participate in the care of your patient.        Sincerely,        Gisselle Mancilla PA-C    Electronically signed

## 2025-03-18 NOTE — PROGRESS NOTES
"I have reviewed and updated the patient's Past Medical History, Social History, Family History and Medication List.    ALLERGIES  Erythromycin, Losartan, and Vioxx    Spine Surgery Follow Up    REFERRING PHYSICIAN: No ref. provider found   PRIMARY CARE PHYSICIAN: Shay Kirkpatrick           Chief Complaint:   RECHECK (MRI review)      History of Present Illness:  Symptom Profile Including: location of symptoms, onset, severity, exacerbating/alleviating factors, previous treatments:        Camacho Bhagat is a 59 year old male who presents today for MRI follow-up.Last seen in clinic for initial consultation 12/23/2024 for evaluation of chronic neck and back pain.  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  Plan was for physical therapy, MRI, greater trochanteric hip injections.      Today, reports continued thoracic and lumbar pain.  Pain is worst at the thoracolumbar region of his back, slightly off to the right.  Pain also present in lumbar paraspinals.  Pain is generally worse if sitting for a prolonged period or if walking for long period of time; better with stretching and standing.  Pain is divided into about 80% back pain, 20% bilateral hip pain, right equal to left. Legs intermittently \"give out\"; left generally more often than right.  Greater trochanteric injections helped with his bilateral hip pain, but this was only temporary.  Physical therapy has not been significantly helping things. He is wondering about muscle relaxers he could take, he only takes robaxin sparingly as he is concerned how it could affect his liver function.    Past treatments tried:  - Physical therapy: Currently enrolled, had this not found it very helpful yet.    - Injections:   2/15/23 L4-5 translaminar DANNY: per patient, not as helpful as subsequent L2-3 DANNY  6/7/23 L2-3 translaminar DANNY: per patient, helped significantly, lasted 6-12 " months.   9/13/22 bilateral greater trochanter injections (Soren) - helped a lot  2/3/2025 bilateral greater trochanteric hip injection (Dr. Stinson) -per patient, helped significantly for the first few days (was able to walk up and down his driveway with less difficulty), but then physical therapy made pain worse again.  - Medications: can't take NSAIDs or tylenol. Has PRN oxycodone 5mg, but takes very rarely. Has PRN methocarbamol. Takes 800mg gabapenin HS. Is on daily low dose prednisone    KEVIN Scores:  Oswestry (KEVIN) Questionnaire        3/17/2025     8:01 AM   OSWESTRY DISABILITY INDEX   Count 9    Sum 19    Oswestry Score (%) 42.22 %        Patient-reported      Neck Disability Index (NDI) Questionnaire        1/31/2025    11:12 AM   Neck Disability Index (NDI)   Neck Disability Index: Count 10   NDI: Total Score = SUM (points for all 10 findings) 10   Neck Disability in Percent = (Total Score) / 50 * 100 20 (%)        PROMIS-10 Scores:  Global Mental Health Score: (Patient-Rptd) (P) 20  Global Physical Health Score: (Patient-Rptd) (P) 14  PROMIS TOTAL - SUBSCORES: (Patient-Rptd) (P) 34         Physical Exam:    Constitutional - Patient is healthy, well-nourished and appears stated age   Respiratory - Patient is breathing normally and in no respiratory distress.   Skin - No suspicious rashes or lesions.   Psychiatric - Normal mood and affect.   Cardiovascular - Extremities warm and well perfused.   Eyes - Visual acuity is normal to the written word.   ENT - Hearing intact to the spoken word.   GI - No abdominal distention.   Musculoskeletal - Non-antalgic gait without use of assistive devices. Localizes main pain to midline and right paraspinals thoracolumbar junction. Tender to palpation midline about T8-L3, tender to right paraspinals. See detailed exam from last visit.           Imaging:   I ordered and independently reviewed new radiographs at this clinic visit. The results were discussed with the patient.   Findings include:    3/12/2025 MRI thoracic spine without contrast: Multilevel spondylosis. T9-10 spinal cord shape change without significant stenosis.  No significant foraminal stenosis.  Per radiologist: Multiple bilateral T2 hyperintense nodules or cysts along the thoracic nerves as they course beneath the ribs. Differential could include nerve root sheath cysts and neoplastic processes such as multiple schwannomas.    3/12/2025 MRI lumbar spine without contrast: Multilevel degenerative changes, facet arthropathy L5-S1 left>right foraminal stenosis. L3-4, L4-5 moderate bilateral foraminal stenosis. no significant spinal canal stenosis              Assessment and Plan:   Assessment:  59 year old male with   Chronic axial thoracolumbar back pain  Chronic axial neck pain  Bilateral greater trochanter bursitis  Osteopenia (2024 DEXA most negative T-score -1.2)     Plan:  Reviewed MRI findings with patient which demonstates multilevel degenerative changes, some foraminal stenosis in the lumbar spine. I will order the MRI thoracic w contrast to further evaluate the multiple nerve nodules identified on imaging. For the back pain, we discussed various treatment options. He cannot take NSAIDs or tylenol. He has oxycodone available but uses very sparingly. Also has robaxin and gabapentin. He would like to try a different muscle relaxer, we looked into some options, but he would like to try valium. I can provide a one time prescription for this, but he should reach out to transplant pharmacist for consideration of alternative muscle relaxer options. He should continue with PT program. He has had a lot of success with epidurals in the past, we can try a new order now; but would recommend referral to medical spine team for continued non-operative management, consideration of injections, possible MBB/RFA if indicated.      - one time Rx for valium to take sparingly for muscle spasms. He can work with transplant pharmacy  about best muscle relaxer options   - continue PT  - Ordered MRI thoracic with and without contrast to further evaluate nerve root nodules. Will discuss results with patient.  - referral to neurosurgery to discuss above and if follow up on these is needed  - referral to Dr. Paris for comprehensive pain management.     Respectfully,  Gisselle Mancilla (ruth Clayton), COLETTE  Orthopaedic Spine Surgery  Dept Orthopaedic Surgery, [facility]  [facility] [place] Phone: [phone]    20 minutes spent on the date of the encounter doing chart review, review of outside records, review of test results, my own interpretation of tests, documentation, patient history, physical exam & discussion of plan with patient and family.    Dictation Disclaimer: Some of this Note has been completed with voice-recognition dictation software. Although errors are generally corrected real-time, there is the potential for a rare error to be present in the completed chart.

## 2025-03-19 ENCOUNTER — OFFICE VISIT (OUTPATIENT)
Dept: OPHTHALMOLOGY | Facility: CLINIC | Age: 60
End: 2025-03-19
Attending: OPHTHALMOLOGY
Payer: COMMERCIAL

## 2025-03-19 DIAGNOSIS — Z96.1 PSEUDOPHAKIA, LEFT EYE: Primary | ICD-10-CM

## 2025-03-19 PROCEDURE — 99024 POSTOP FOLLOW-UP VISIT: CPT | Performed by: OPHTHALMOLOGY

## 2025-03-19 PROCEDURE — 99213 OFFICE O/P EST LOW 20 MIN: CPT | Performed by: OPHTHALMOLOGY

## 2025-03-19 ASSESSMENT — EXTERNAL EXAM - RIGHT EYE: OD_EXAM: NORMAL

## 2025-03-19 ASSESSMENT — VISUAL ACUITY
METHOD: SNELLEN - LINEAR
OS_SC+: +2
OD_SC+: -1
OD_SC: 20/20
OS_SC: 20/50
OS_PH_SC: 20/30

## 2025-03-19 ASSESSMENT — TONOMETRY
OD_IOP_MMHG: 17
OS_IOP_MMHG: 18
IOP_METHOD: TONOPEN

## 2025-03-19 ASSESSMENT — SLIT LAMP EXAM - LIDS
COMMENTS: NORMAL
COMMENTS: NORMAL

## 2025-03-19 NOTE — NURSING NOTE
Chief Complaints and History of Present Illnesses   Patient presents with    Post Op (Ophthalmology) Left Eye     POP Left eye CE/IOL 03/14/25     Chief Complaint(s) and History of Present Illness(es)       Post Op (Ophthalmology) Left Eye              Comments: POP Left eye CE/IOL 03/14/25              Comments    The patient has no eye pain.   He is using Prednisolone four times daily, Ofloxacin four times daily and Ketorlac four times daily in the Left eye.  Ally Zepeda, COA, COA 7:20 AM 03/19/2025                    
No difficulties

## 2025-03-19 NOTE — PROGRESS NOTES
HPI       Post Op (Ophthalmology) Left Eye     Additional comments: POP Left eye CE/IOL 03/14/25             Comments    The patient has no eye pain.   He is using Prednisolone four times daily, Ofloxacin four times daily and Ketorlac four times daily in the Left eye.  RAÚL Chao, COA 7:20 AM 03/19/2025            Last edited by Ally Zepeda COA on 3/19/2025  7:20 AM.           Review of systems for the eyes was negative other than the pertinent positives/negatives listed in the HPI.      Assessment & Plan    HPI:    Camacho Bhagat is a 59 year old male with history of T2DM, HTN, HLD, ED, liver transplant, laser in situ keratomileusis (LASIK) presents for Postoperative week 1 left eye     POHx: lasik  PMHx: 2DM, HTN, HLD, ED, liver transplant  Current Medications:   Current Outpatient Medications   Medication Sig Dispense Refill    alcohol swab prep pads Use to swab area of injection/francisca as directed. 100 each 3    alpha-lipoic acid 600 MG capsule Take 600 mg by mouth      amLODIPine (NORVASC) 10 MG tablet Take 1 tablet (10 mg) by mouth daily. 90 tablet 3    augmented betamethasone dipropionate (DIPROLENE-AF) 0.05 % external ointment Apply twice daily as needed for rash on the legs 45 g 11    cholecalciferol (VITAMIN D3) 1000 UNIT tablet Take 1 tablet (1,000 Units) by mouth daily 100 tablet 3    Continuous Glucose Sensor (DEXCOM G6 SENSOR) MISC Change every 10 days. 9 each 3    Continuous Glucose Transmitter (DEXCOM G6 TRANSMITTER) MISC Change every 3 months. 1 each 3    cyclobenzaprine (FLEXERIL) 10 MG tablet Take 1 tablet (10 mg) by mouth 3 times daily as needed for muscle spasms 90 tablet 3    diazepam (VALIUM) 2 MG tablet Take 1 tablet (2 mg) by mouth 2 times daily as needed for muscle spasms. 20 tablet 0    doxazosin (CARDURA) 8 MG tablet Take 1 tablet (8 mg) by mouth at bedtime. Take a total of 10 mg at bedtime 90 tablet 3    empagliflozin (JARDIANCE) 10 MG TABS tablet Take 1 tablet (10  mg) by mouth daily. 90 tablet 1    fluticasone (FLONASE) 50 MCG/ACT nasal spray Spray 1 spray into both nostrils daily 20 mL 3    furosemide (LASIX) 40 MG tablet Take 40 mg daily and OK to take an extra for LE edema. 180 tablet 3    gabapentin (NEURONTIN) 800 MG tablet Take 1 tablet (800 mg) by mouth 3 times daily. 90 tablet 1    hydrALAZINE (APRESOLINE) 50 MG tablet Take 1 tablet (50 mg) by mouth 2 times daily. 180 tablet 3    hydrOXYzine (ATARAX) 25 MG tablet Take 1 tablet (25 mg) by mouth 3 times daily as needed for itching 90 tablet 3    Insulin Disposable Pump (OMNIPOD 5 G6 POD, GEN 5,) MISC 1 each See Admin Instructions Use per 's instructions. 1 each 1    Insulin Disposable Pump (OMNIPOD 5 PODS, GEN 5,) MISC 1 each See Admin Instructions. Change every 2 days. Use for insulin administration. 45 each 3    Insulin Lispro-aabc (LYUMJEV KADIE) 200 UNIT/ML SOPN 1 Units by Other route See Admin Instructions. Via insulin pump. Approx 170 units daily plus 2-3 units daily to prime pump and tubing. (Patient taking differently: 1 Units by Other route once a week. Via insulin pump. Approx 170 units daily plus 2-3 units daily to prime pump and tubing.) 170 mL 3    insulin pen needle (BD ENE U/F) 32G X 4 MM miscellaneous Use 1 pen needle 4 times daily or as directed. 400 each 1    ketorolac (ACULAR) 0.5 % ophthalmic solution Place 1 drop Into the left eye 4 times daily. Start 2 days prior to surgery four times a day, after surgery: Four times a day x 1 week, three times a day x 1 week, twice a day x 1 week, daily x 1 week then stop 10 mL 0    lidocaine (LIDODERM) 5 % patch Place 1 patch onto the skin every 24 hours To prevent lidocaine toxicity, patient should be patch free for 12 hrs daily. 30 patch 11    lidocaine (XYLOCAINE) 5 % external ointment Apply topically as needed for moderate pain 90 g 11    losartan (COZAAR) 25 MG tablet Take 1 tablet (25 mg) by mouth daily. 90 tablet 3    methocarbamol (ROBAXIN)  500 MG tablet Take 1 tablet (500 mg) by mouth 2 times daily as needed for muscle spasms. 60 tablet 11    metoprolol succinate ER (TOPROL XL) 200 MG 24 hr tablet Take 1 tablet (200 mg) by mouth daily. 90 tablet 1    MOUNJARO 7.5 MG/0.5ML SOAJ 7.5 mg daily.      moxifloxacin (VIGAMOX) 0.5 % ophthalmic solution Place 1 drop Into the left eye 4 times daily. Start 2 days prior to surgery four times a day. After Surgery: four times a day  X 1 week 3 mL 0    mycophenolic acid (GENERIC EQUIVALENT) 360 MG EC tablet Take 2 tablets (720 mg) by mouth every 12 hours. 360 tablet 3    oxyCODONE (ROXICODONE) 5 MG tablet Take 1 tablet (5 mg) by mouth 4 times daily as needed for pain. maximum 6 tablet(s) per day 30 tablet 0    prednisoLONE acetate (PRED FORTE) 1 % ophthalmic suspension Place 1-2 drops Into the left eye 4 times daily. After surgery: Four times a day x 1 week, three times a day x 1 week, twice a day x 1 week, daily x 1 week then stop 10 mL 0    predniSONE (DELTASONE) 2.5 MG tablet Take 1 tablet (2.5 mg) by mouth daily. 90 tablet 3    tacrolimus (GENERIC EQUIVALENT) 1 MG capsule Take 4 capsules (4 mg) by mouth every morning AND 3 capsules (3 mg) every evening. 630 capsule 3    Tacrolimus 1 MG PACK       tretinoin (RETIN-A) 0.025 % external cream Apply a pea-sized amount evenly over the face at nighttime before bed 45 g 11    triamcinolone (KENALOG) 0.1 % external ointment Apply topically 2 times daily to the itching on the legs 80 g 1    ursodiol (ACTIGALL) 500 MG tablet Take 1 tablet (500 mg) by mouth 2 times daily. 180 tablet 3     No current facility-administered medications for this visit.     PSHx: Cataract extraction/intraocular lens right eye 2/1/24,  left eye  03/14/25    Current Eye Medications:    Assessment & Plan:  Moxifloxacin/pred forte/ketorolac four times a day       (Z96.1) Pseudophakia, right eye  (primary encounter diagnosis)  Cataract extraction/intraocular lens right eye Jude  2/1/24    Pseudophakia, left eye  Jude left eye  03/14/25   Doing great  Continue drop taper    (Z98.890) H/O laser assisted in situ keratomileusis  2003 with mild regression left eye     (H52.03,  H52.203,  H52.4) Hyperopia of both eyes with astigmatism and presbyopia  MRX provided on 2/26/2025    (E11.3213,  Z79.4) Type 2 diabetes mellitus with both eyes affected by mild nonproliferative retinopathy and macular edema, with long-term current use of insulin (H)  Diagnosed 2003  Most recent HgBA1c 6.2 on 7/5/24  Mild background diabetic retinopathy without neovascularization noted on today's exam.    Retina saw patient on 11/11/2024, no recommendation for Avastin at that time, edema though to be 2/2 diabetes rather than Benton Marga given bilaterality. Left eye vision changes likely from cataract.     Today with mildly worse edema left eye, stable right eye, will proceed with Cataract extraction/intraocular lens with Avastin at time of Cataract extraction/intraocular lens      (H35.033) Hypertensive retinopathy of both eyes  Evaluated by Dr. Saulo Parr on 11/11/2024; patient on amlodipine.    (H04.129) Dry eye    Return for as scheduled.        Stan Roque MD     Attending Physician Attestation:  Complete documentation of historical and exam elements from today's encounter can be found in the full encounter summary report (not reduplicated in this progress note).  I personally obtained the chief complaint(s) and history of present illness.  I confirmed and edited as necessary the review of systems, past medical/surgical history, family history, social history, and examination findings as documented by others; and I examined the patient myself.  I personally reviewed the relevant tests, images, and reports as documented above.  I formulated and edited as necessary the assessment and plan and discussed the findings and management plan with the patient and family. - Stan Roque MD

## 2025-03-21 ENCOUNTER — ANCILLARY PROCEDURE (OUTPATIENT)
Dept: MRI IMAGING | Facility: CLINIC | Age: 60
End: 2025-03-21
Attending: PHYSICIAN ASSISTANT
Payer: COMMERCIAL

## 2025-03-21 DIAGNOSIS — M54.6 CHRONIC BILATERAL THORACIC BACK PAIN: ICD-10-CM

## 2025-03-21 DIAGNOSIS — G58.9 NERVE DISORDER: ICD-10-CM

## 2025-03-21 DIAGNOSIS — G89.29 CHRONIC BILATERAL THORACIC BACK PAIN: ICD-10-CM

## 2025-03-21 PROCEDURE — A9585 GADOBUTROL INJECTION: HCPCS | Performed by: RADIOLOGY

## 2025-03-21 PROCEDURE — 72157 MRI CHEST SPINE W/O & W/DYE: CPT | Performed by: RADIOLOGY

## 2025-03-21 RX ORDER — GADOBUTROL 604.72 MG/ML
15 INJECTION INTRAVENOUS ONCE
Status: COMPLETED | OUTPATIENT
Start: 2025-03-21 | End: 2025-03-21

## 2025-03-21 RX ADMIN — GADOBUTROL 11.5 ML: 604.72 INJECTION INTRAVENOUS at 16:17

## 2025-03-24 ENCOUNTER — OFFICE VISIT (OUTPATIENT)
Dept: INTERNAL MEDICINE | Facility: CLINIC | Age: 60
End: 2025-03-24
Payer: COMMERCIAL

## 2025-03-24 VITALS
BODY MASS INDEX: 34.96 KG/M2 | HEIGHT: 72 IN | SYSTOLIC BLOOD PRESSURE: 120 MMHG | DIASTOLIC BLOOD PRESSURE: 66 MMHG | OXYGEN SATURATION: 97 % | HEART RATE: 77 BPM | TEMPERATURE: 98.2 F | RESPIRATION RATE: 16 BRPM

## 2025-03-24 DIAGNOSIS — M15.0 PRIMARY OSTEOARTHRITIS INVOLVING MULTIPLE JOINTS: Primary | ICD-10-CM

## 2025-03-24 DIAGNOSIS — M47.814 SPONDYLOSIS OF THORACIC SPINE: ICD-10-CM

## 2025-03-24 DIAGNOSIS — Z23 NEED FOR VACCINATION: ICD-10-CM

## 2025-03-24 DIAGNOSIS — N18.31 STAGE 3A CHRONIC KIDNEY DISEASE (H): ICD-10-CM

## 2025-03-24 PROCEDURE — 3078F DIAST BP <80 MM HG: CPT | Performed by: INTERNAL MEDICINE

## 2025-03-24 PROCEDURE — 3074F SYST BP LT 130 MM HG: CPT | Performed by: INTERNAL MEDICINE

## 2025-03-24 PROCEDURE — 90471 IMMUNIZATION ADMIN: CPT | Performed by: INTERNAL MEDICINE

## 2025-03-24 PROCEDURE — 90677 PCV20 VACCINE IM: CPT | Performed by: INTERNAL MEDICINE

## 2025-03-24 PROCEDURE — 99214 OFFICE O/P EST MOD 30 MIN: CPT | Mod: 24 | Performed by: INTERNAL MEDICINE

## 2025-03-24 RX ORDER — OXYCODONE HYDROCHLORIDE 5 MG/1
5 TABLET ORAL 4 TIMES DAILY PRN
Qty: 30 TABLET | Refills: 0 | Status: SHIPPED | OUTPATIENT
Start: 2025-03-24

## 2025-03-24 NOTE — PROGRESS NOTES
"  Assessment & Plan     Stage 3a chronic kidney disease (H)  - empagliflozin (JARDIANCE) 10 MG TABS tablet; Take 1 tablet (10 mg) by mouth daily.    Primary osteoarthritis involving multiple joints  Continuing to have arthritis pain but unable to take tylenol due to liver transplant and unable to do NSAIDs due to CKD. Have seen rheumatology in the past to consider alternative medication management options with no interventions identified. Discussed option for topical treatment with lidocaine gel or voltaren if having worsening pain in specific joint.   - oxyCODONE (ROXICODONE) 5 MG tablet; Take 1 tablet (5 mg) by mouth 4 times daily as needed for pain. maximum 6 tablet(s) per day    Need for vaccination  Pneumococcal vaccine completed in clinic today.  - Pneumococcal 20 Valent Conjugate (PCV20)  - REVIEW OF HEALTH MAINTENANCE PROTOCOL ORDERS    Spondylosis of thoracic spine  Continuing to follow with orthopedic surgery. Plan to consider referral to neurosurgery following recent MRI.       BMI  Estimated body mass index is 34.96 kg/m  as calculated from the following:    Height as of this encounter: 1.829 m (6' 0.01\").    Weight as of 3/19/25: 116.9 kg (257 lb 12.8 oz).             No follow-ups on file.    Subjective   Camacho is a 59 year old, presenting for the following health issues:  Follow Up      3/24/2025     7:00 AM   Additional Questions   Roomed by Moriah YAN   Accompanied by N/A         3/24/2025   Declines Weight   Did patient decline having their weight taken? Yes     History of Present Illness       Reason for visit:  6 month visit    He eats 2-3 servings of fruits and vegetables daily.He consumes 0 sweetened beverage(s) daily.He exercises with enough effort to increase his heart rate 9 or less minutes per day.  He exercises with enough effort to increase his heart rate 3 or less days per week.   He is taking medications regularly.      No specific questions or concerns today.     Last seen " "8/12:  -Inflamed seborrheic keratosis: cryotherapy with derm  -Discussed restarting alpha-lipoic acid 600 mg BID for polyneuropathy- hasn't started that as he forgot to order it; is on gabapentin and thinks it provides some relief and would like to continue    Continuing to have systemic arthritis pain but unable to do tylenol due to liver transplant and unable to do NSAIDs due to CKD. Have seen rheumatology in the past to consider alternative medication management options with no interventions identified.     Saw derm 8/30: Found to have SCC and had mohs 10/23; following with derm every year    Seeing orthopedic surgery for back and had MRI last Friday. Considering possible referral to neurosurgery for management. Did PT but found it to not provide relief and actually worsened pain.     Saw sports med for hip and had injection 2/5 of bursa.    Cataract surgery 3/14: Can now see without glasses which is big improvement.     Will be increasing to Mounnjaro at 10mg on Friday. Continuing to tolerate it well. Goal to continue medication increases to possibly reduce insulin needs. Follows up with mele on Monday.     Has seen podiatry, transplant follow up clinic, nephrology, and cardiology since last visit as well.     Hydralazine 10 mg for BP seems to have worked for BP control.                   Objective    /66 (BP Location: Right arm, Patient Position: Sitting, Cuff Size: Adult Large)   Pulse 77   Temp 98.2  F (36.8  C)   Resp 16   Ht 1.829 m (6' 0.01\")   SpO2 97%   BMI 34.96 kg/m    Body mass index is 34.96 kg/m .  Physical Exam   GENERAL: alert and no distress  NECK: no adenopathy, no asymmetry, masses, or scars  RESP: lungs clear to auscultation - no rales, rhonchi or wheezes  CV: regular rate and rhythm, normal S1 S2, no S3 or S4, no murmur, click or rub, no peripheral edema  ABDOMEN: soft, nontender, no hepatosplenomegaly, no masses and bowel sounds normal  MS: no gross musculoskeletal defects noted, " no edema          Mikayla Morel, MS3  Patient was seen and discussed with Dr. Kirkpatrick    Attestation  I, Shay Kirkpatrick, was present with the medical student who participated in the service and in the documentation of the note.  I have verified the history and personally performed the physical exam and medical decision making.  I agree with the assessment and plan of care as documented in the note.    Shay Kirkpatrick MD, FACP      Signed Electronically by: Shay Kirkpatrick MD

## 2025-03-24 NOTE — PATIENT INSTRUCTIONS
**Please start alpha lipoic acid at 600mg twice a day for the neuropathy. They can be purchased in many drug stores or online.**

## 2025-03-25 DIAGNOSIS — M47.816 LUMBAR SPONDYLOSIS: ICD-10-CM

## 2025-03-25 DIAGNOSIS — G58.9 NERVE DISORDER: ICD-10-CM

## 2025-03-25 DIAGNOSIS — G89.29 CHRONIC MIDLINE LOW BACK PAIN WITHOUT SCIATICA: Primary | ICD-10-CM

## 2025-03-25 DIAGNOSIS — M54.50 CHRONIC MIDLINE LOW BACK PAIN WITHOUT SCIATICA: Primary | ICD-10-CM

## 2025-03-25 NOTE — PROGRESS NOTES
Discussed results of MRI again and current symptoms. Ordering L5-S1 interlaminar DANNY complaints of back pain, bilateral leg pain with prolonged walking. Encouraged to see Dr. Raina Mancilla, PAJoseC

## 2025-03-27 NOTE — TELEPHONE ENCOUNTER
RECORDS RECEIVED FROM: internal   REASON FOR VISIT: Nerve sheath cysts, needs to establish care    PROVIDER: Dr. Moran   DATE OF APPT: 5/6/25   NOTES (FOR ALL VISITS) STATUS DETAILS   OFFICE NOTE from referring provider Internal Gisselle GONZALEZ @ [facility] Ortho:  3/18/25  12/23/24   OFFICE NOTE from other specialist Internal Dr Ronaldo Doty @ [facility] Sports Med:  7/13/23 6/22/23 5/30/23 11/17/22 9/13/22   MEDICATION LIST Internal    IMAGING  (FOR ALL VISITS)     XRAY Internal [facility]:  XR EOS Total Body 12/23/24  XR Lumbar Spine 9/13/22   MRI (HEAD, NECK, SPINE) Internal [facility]:  MRI Thoracic Spine 3/21/25  MRI Thoracic Spine 3/12/25  MRI Lumbar Spine 3/12/25  MRI Lumbar Spine 9/16/22

## 2025-03-31 ENCOUNTER — OFFICE VISIT (OUTPATIENT)
Dept: ENDOCRINOLOGY | Facility: CLINIC | Age: 60
End: 2025-03-31
Payer: COMMERCIAL

## 2025-03-31 VITALS
DIASTOLIC BLOOD PRESSURE: 65 MMHG | BODY MASS INDEX: 35.66 KG/M2 | SYSTOLIC BLOOD PRESSURE: 102 MMHG | WEIGHT: 263 LBS | HEART RATE: 79 BPM | OXYGEN SATURATION: 98 %

## 2025-03-31 DIAGNOSIS — E66.813 CLASS 3 SEVERE OBESITY WITH SERIOUS COMORBIDITY AND BODY MASS INDEX (BMI) OF 40.0 TO 44.9 IN ADULT, UNSPECIFIED OBESITY TYPE (H): ICD-10-CM

## 2025-03-31 DIAGNOSIS — Z94.4 S/P LIVER TRANSPLANT (H): ICD-10-CM

## 2025-03-31 DIAGNOSIS — Z79.4 TYPE 2 DIABETES MELLITUS WITH DIABETIC POLYNEUROPATHY, WITH LONG-TERM CURRENT USE OF INSULIN (H): Primary | ICD-10-CM

## 2025-03-31 DIAGNOSIS — E78.00 HYPERCHOLESTEROLEMIA: ICD-10-CM

## 2025-03-31 DIAGNOSIS — N18.31 CHRONIC KIDNEY DISEASE, STAGE 3A (H): ICD-10-CM

## 2025-03-31 DIAGNOSIS — E66.01 CLASS 3 SEVERE OBESITY WITH SERIOUS COMORBIDITY AND BODY MASS INDEX (BMI) OF 40.0 TO 44.9 IN ADULT, UNSPECIFIED OBESITY TYPE (H): ICD-10-CM

## 2025-03-31 DIAGNOSIS — E11.42 TYPE 2 DIABETES MELLITUS WITH DIABETIC POLYNEUROPATHY, WITH LONG-TERM CURRENT USE OF INSULIN (H): Primary | ICD-10-CM

## 2025-03-31 PROCEDURE — 99215 OFFICE O/P EST HI 40 MIN: CPT | Mod: 24 | Performed by: INTERNAL MEDICINE

## 2025-03-31 PROCEDURE — 3078F DIAST BP <80 MM HG: CPT | Performed by: INTERNAL MEDICINE

## 2025-03-31 PROCEDURE — 1125F AMNT PAIN NOTED PAIN PRSNT: CPT | Performed by: INTERNAL MEDICINE

## 2025-03-31 PROCEDURE — 3074F SYST BP LT 130 MM HG: CPT | Performed by: INTERNAL MEDICINE

## 2025-03-31 PROCEDURE — 95251 CONT GLUC MNTR ANALYSIS I&R: CPT | Performed by: INTERNAL MEDICINE

## 2025-03-31 RX ORDER — ACYCLOVIR 400 MG/1
TABLET ORAL
Qty: 9 EACH | Refills: 3 | Status: SHIPPED | OUTPATIENT
Start: 2025-03-31

## 2025-03-31 RX ORDER — ACYCLOVIR 400 MG/1
TABLET ORAL
Qty: 1 EACH | Refills: 0 | Status: SHIPPED | OUTPATIENT
Start: 2025-03-31

## 2025-03-31 ASSESSMENT — PAIN SCALES - GENERAL: PAINLEVEL_OUTOF10: MODERATE PAIN (4)

## 2025-03-31 NOTE — NURSING NOTE
"Chief Complaint   Patient presents with    Diabetes     Vital signs:      BP: 102/65 Pulse: 79     SpO2: 98 %       Weight: 119.3 kg (263 lb)  Estimated body mass index is 35.66 kg/m  as calculated from the following:    Height as of 3/24/25: 1.829 m (6' 0.01\").    Weight as of this encounter: 119.3 kg (263 lb).        "

## 2025-03-31 NOTE — LETTER
3/31/2025       RE: Camacho Bhagat  [address]  [facility] [address]     Dear Colleague,    Thank you for referring your patient, Camacho Bhagat, to the [facility] [place] [facility]. Please see a copy of my visit note below.    03/10/25 10:25 AM  PATIENT LAB/IMAGING STATUS : Orders completed / No further action needed   Patient is showing 4/5 MNCM met. BP out range                                           ASSESSMENT/PLAN:    Camacho Bhagat is a 59 year old man w/ PMx of CASTAÑEDA/HCC s/p liver transplant (3/2017), fibromyalgia, DVT, HTN, RONNIE, OA, and CVA who presents for follow-up of his DM-II     1. Type 2 Diabetes, complicated by triopathy  Overall, he maintains a very good glycemic control, which has improved since using tirzepatide.  Recommendations:  Consistently bolus 10 to 15 minutes prior to eating, especially prior to breakfast  With the weight loss and decreased insulin resistance, we might need to change the insulin to carbohydrate ratio to 1 unit per 8.5 g, if he notices lower blood glucose numbers within 2 hours of bolusing for food intake.  Transition to Dexcom G7.  Counseled on differences between Dexcom G6 and G7.  Follow-up in 6 months.    2.  Obesity  Responding to treatment with tirzepatide.  He continues to follow-up in the weight management clinic.    3.  Osteopenia  He is only a candidate for Prolia, so treatment was deferred.    No orders of the defined types were placed in this encounter.    =========================================================================================     Camacho Bhagat is a 59 year old male with PMHx of CASTAÑEDA/HCC s/p liver transplant (3/2017), fibromyalgia, CKD, DVT, HTN, RONNIE, OA, who presents for follow-up of type 2 DM, diagnosed in 2002.  Oral meds first, later placed on insulin.     Related history: h/o CASTAÑEDA cirrhosis and HCC s/p liver transplant 3/2017. Course complicated by  acute abdomen/neutropenic fever requiring right hemicolectomy and colostomy Fall 2017. He later underwent colostomy takedown 1/5/18 which was complicated by abdominal wall abscess at a drain site. During this course, he has also developed excessive proteinuria.    Hemoglobin A1c was 6.2% on 7/5/24, in the context of anemia. It was 5% on 2/28/25.   I reviewed both insulin pump and sensor records.    Insulin pump (Omnipod 5, May 2023) settings:  Basal rate   12 AM 1.5 units/h  8 AM 1.45 units/h  2 PM 1.4 units/h   6 PM 1.45 units/h   9 PM 1.5 units/h  Insulin to carbohydrate ratio 1 unit per 7.5 g   Sensitivity 25   Blood glucose correction threshold 120  Glucose target range 110  Active insulin time 3 hours    He uses Humalog 200 pens to fill the pump reservoir.     Total daily insulin dose is 47 units, of which 48% is basal insulin.  On the sensor, average glucose is 137, with a standard deviation of 33 and a coefficient of variation of 24%, corresponding to an estimated GMI of 6.6%.  87% of the blood sugar numbers are within target and 1% are in the mild hypoglycemic range.  The pump is used in auto mode 99% of time.    Occasionally, he does enter manual corrections for postprandial hyperglycemia.  He very rarely overrides the pump.  The pod is changed every 3.5 days.  Not all meals/snacks are preceded by a bolus.  In the morning, he sometimes has a mild spike    Treatment with empagliflozin was started on 1/10/24, at 10 mg daily, by nephrology.    In July 2024, he was started on treatment with Tirzepatide, through weight management clinic.  His weight at that time was 285 pounds.  He has been taking it at 10 mg weekly.  Current weight is 263 lbs.   It has improved the BG numbers. He does have longstanding episodes of diarrhea but otherwise he denies nausea, vomiting, abdominal pain or constipation. In the past, he tried Victoza and he was not able to tolerate it due to projectile vomiting.     Related  meds:  Prednisone started for transplant and the dose remains 2.5 mg per day.     Diabetes related complications:     1. Retinopathy  Last eye exam was in 2/25. Note reviewed. It revealed mild nonproliferative retinopathy and macular edema, bilaterally. S/P cataract surgery.      2.  Nephropathy: yes. CKD IIIa . He has significant proteinuria and f/up with nephrology.  Recent GFR in the 30s. On losartan, 25 mg daily.      3. Neuropathy.  Gabapentin was prescribed to control the neuropathic pain in the left foot, and back pain which started over 10 years ago. Current dose 800 mg TID. No ulcers or infection. No amputation.  He has bilateral foot pain. The last appointment with podiatry was in 12/24.      4. Lipids   2/28/25: LDL cholesterol 23, HDL cholesterol 33,    The patient has declined treatment with statins.     In general, he is able to recognize the hypoglycemic episodes: He feels flushed, warm and dizzy.  The lowest blood glucose he remembers experiencing was in the 50s.  He declined having glucagon at home.     LS XRay from 9/13/22 revealed an L1 vertebral compression fracture.  The fracture was not mentioned on the follow-up MRIs with the most recent one done in 3/25.   The patient has a longstanding history of back pain, which started in 1993.  The lumbar spine x-ray was recommended as he complained of hip pain, for which he has been receiving intra-articular steroid injections.    DXA 2/2020  The most negative and valid T-score of -1.8 at the level of the right femoral neck. Compared with baseline 2019 exam there was a significant decrease of BMD at the level of the lumbar spine, left total hip and right total hip.  (-2.6%; -17.4%; -18.2%).     DXA 9/2024   L1-L3 T-score 1.5, left femoral neck T-score -0.7, right femoral neck T-score -1.2, left total hip T-score -0.4, right total hip T-score -0.3, 33% distal radius T-score 0.5.  FRAX 7.5%/1.2%.     Vitamin D 43 on 2/1/24 and 51 (20-50) on  2/28/25  He has been taking 2000 U daily.   PTH 31 on 9/27/22 and 73 on 11/1/24    Past Medical History  Past Medical History:   Diagnosis Date     Cancer (H) 12/15    Stage 4 liver cancer     Diabetes type 2, controlled (H) 11/10/2016     Esophageal reflux      Fibromyalgia 01/2009    dx with Dr Benitez( Rheum)     Gangrene of finger (H) 08/25/2017     H/O deep venous thrombosis 11/2001    Permanent IVC filter in place.     H/O CASTAÑEDA (nonalcoholic steatohepatitis)      H/O Pneumonia, organism unspecified(486) 10/2001    Included ARDS, sepsis, and  acute renal failure; hospitalized, required tracheostomy placement.     H/O: HTN (hypertension) 11/2001    No longer prescribed antihypertensive medication.       History of hepatocellular carcinoma      History of liver transplant (H)      History of obstructive sleep apnea     No longer wears CPAP since losing approximately 200 pounds with his liver transplant and its complications.       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     Osteoarthritis      Presence of PERMANENT IVC filter      Rheumatoid arthritis(714.0)     remission for many years     Squamous cell skin cancer     on back   Osteopenia 2020    Past Surgical History  Past Surgical History:   Procedure Laterality Date     ARTHROSCOPY KNEE WITH MENISCAL REPAIR Right 2008    Right knee arthroscopy     BENCH LIVER N/A 03/04/2017    Procedure: BENCH LIVER;  Surgeon: Jovan Tran MD;  Location: [facility] OR     BIOPSY  5/2017    Liver     CHOLECYSTECTOMY       COLONOSCOPY N/A 07/21/2017    Procedure: COMBINED COLONOSCOPY, SINGLE OR MULTIPLE BIOPSY/POLYPECTOMY BY BIOPSY;  Colonoscopy;  Surgeon: Izaiah Montes MD;  Location: [facility]     COLONOSCOPY N/A 10/24/2022    Procedure: COLONOSCOPY, WITH POLYPECTOMY AND BIOPSY;   Surgeon: Abril Mcneill MD;  Location: [facility]     ENDOSCOPIC RETROGRADE CHOLANGIOPANCREATOGRAM N/A 05/22/2018    Procedure: COMBINED ENDOSCOPIC RETROGRADE CHOLANGIOPANCREATOGRAPHY, PLACE TUBE/STENT;  Endoscopic Retrograde Cholangiopancreatography with sphincterotomy and pancreatic duct stent placement;  Surgeon: Zay Gaitan MD;  Location: [facility] OR     ENT SURGERY      Repair of deviated septum     ESOPHAGOSCOPY, GASTROSCOPY, DUODENOSCOPY (EGD), COMBINED N/A 08/04/2016    Procedure: COMBINED ESOPHAGOSCOPY, GASTROSCOPY, DUODENOSCOPY (EGD), BIOPSY SINGLE OR MULTIPLE;  Surgeon: Trent Pederson MD;  Location:  GI     ESOPHAGOSCOPY, GASTROSCOPY, DUODENOSCOPY (EGD), COMBINED N/A 08/27/2017    Procedure: COMBINED ESOPHAGOSCOPY, GASTROSCOPY, DUODENOSCOPY (EGD);;  Surgeon: Los Wynn MD;  Location: [facility]     ESOPHAGOSCOPY, GASTROSCOPY, DUODENOSCOPY (EGD), COMBINED N/A 08/17/2023    Procedure: Esophagoscopy, gastroscopy, duodenoscopy (EGD), combined;  Surgeon: Trent Villanueva MD;  Location: [facility]     INCISION AND DRAINAGE ABDOMEN WASHOUT, COMBINED Right 02/14/2018    Procedure: COMBINED INCISION AND DRAINAGE ABDOMEN WASHOUT;  COMBINED INCISION AND DRAINAGE right ABDOMEN flank;  Surgeon: Sara Dinh MD;  Location: [facility]     INTRAVITREAL INJECTION GAS/TPA/METHOTREXATE/ANTIBIOTICS Left 3/14/2025    Procedure: INJECTION, PHARMACOLOGIC AGENT, INTRAVITREAL;  Surgeon: Stan Roque MD;  Location: [facility] OR     LAPAROTOMY EXPLORATORY N/A 07/04/2017    Procedure: LAPAROTOMY EXPLORATORY;  Exploratory Laparotomy, washout;  Surgeon: Tip Zhang MD;  Location: [facility]     LAPAROTOMY EXPLORATORY N/A 07/05/2017    Procedure: LAPAROTOMY EXPLORATORY;  Exploratory Laparotomy, Washout with closure.;  Surgeon: Tip Zhang MD;  Location: [facility]     LAPAROTOMY EXPLORATORY N/A 07/26/2017    Procedure: LAPAROTOMY EXPLORATORY;  Exploratory Laparotomy, Right angelique-colectomy, end  ileostomy, mucosal fistula, partial omentectomy;  Surgeon: Sara Dinh MD;  Location: [facility] OR     LASIK       ORTHOPEDIC SURGERY Right     Repair of dislocated wrist.       PHACOEMULSIFICATION CLEAR CORNEA WITH STANDARD INTRAOCULAR LENS IMPLANT Right 2024    Procedure: RIGHT EYE PHACOEMULSIFICATION, COMPLEX CATARACT, WITH INTRAOCULAR LENS IMPLANT;  Surgeon: Stan Roque MD;  Location: [facility] OR     PHACOEMULSIFICATION WITH STANDARD INTRAOCULAR LENS IMPLANT Left 3/14/2025    Procedure: LEFT EYE PHACOEMULSIFICATION, CATARACT, WITH STANDARD INTRAOCULAR LENS IMPLANT INSERTION;  Surgeon: Stan Roque MD;  Location: [facility] OR     RELEASE TRIGGER FINGER Right 11/10/2017    Procedure: RELEASE TRIGGER FINGER;  Debride, V-Y Flap Right Index Finger;  Surgeon: Momo Noonan MD;  [facility]     TAKEDOWN ILEOSTOMY N/A 2018    Procedure: TAKEDOWN ILEOSTOMY;  Exploratory Laparotomy, Lysis of Adhesions, Takedown Of End Ileostomy, Takedown of mucocutaneous fistula, ileocolic resection  And Colorectal Anastomosis, Primary repair of Ventral Hernia, Anesthesia Block ;  Surgeon: Sara Dinh MD;  Location: [facility]     TRACHEOSTOMY  2001    During hospitalization for pneumonia.       TRANSPLANT LIVER RECIPIENT  DONOR N/A 2017    Procedure: TRANSPLANT LIVER RECIPIENT  DONOR;  Surgeon: Jovan Tran MD;  Location: [facility]      Medications    Current Outpatient Medications:      alcohol swab prep pads, Use to swab area of injection/francisca as directed., Disp: 100 each, Rfl: 3     alpha-lipoic acid 600 MG capsule, Take 600 mg by mouth, Disp: , Rfl:      amLODIPine (NORVASC) 10 MG tablet, Take 1 tablet (10 mg) by mouth daily., Disp: 90 tablet, Rfl: 3     augmented betamethasone dipropionate (DIPROLENE-AF) 0.05 % external ointment, Apply twice daily as needed for rash on the legs, Disp: 45 g, Rfl: 11     cholecalciferol (VITAMIN D3) 1000 UNIT  tablet, Take 1 tablet (1,000 Units) by mouth daily, Disp: 100 tablet, Rfl: 3     Continuous Glucose Sensor (DEXCOM G6 SENSOR) MISC, Change every 10 days., Disp: 9 each, Rfl: 3     Continuous Glucose Transmitter (DEXCOM G6 TRANSMITTER) MISC, Change every 3 months., Disp: 1 each, Rfl: 3     cyclobenzaprine (FLEXERIL) 10 MG tablet, Take 1 tablet (10 mg) by mouth 3 times daily as needed for muscle spasms, Disp: 90 tablet, Rfl: 3     diazepam (VALIUM) 2 MG tablet, Take 1 tablet (2 mg) by mouth 2 times daily as needed for muscle spasms., Disp: 20 tablet, Rfl: 0     doxazosin (CARDURA) 8 MG tablet, Take 1 tablet (8 mg) by mouth at bedtime. Take a total of 10 mg at bedtime, Disp: 90 tablet, Rfl: 3     empagliflozin (JARDIANCE) 10 MG TABS tablet, Take 1 tablet (10 mg) by mouth daily., Disp: 90 tablet, Rfl: 1     fluticasone (FLONASE) 50 MCG/ACT nasal spray, Spray 1 spray into both nostrils daily, Disp: 20 mL, Rfl: 3     furosemide (LASIX) 40 MG tablet, Take 40 mg daily and OK to take an extra for LE edema., Disp: 180 tablet, Rfl: 3     gabapentin (NEURONTIN) 800 MG tablet, Take 1 tablet (800 mg) by mouth 3 times daily., Disp: 90 tablet, Rfl: 1     hydrALAZINE (APRESOLINE) 50 MG tablet, Take 1 tablet (50 mg) by mouth 2 times daily., Disp: 180 tablet, Rfl: 3     hydrOXYzine (ATARAX) 25 MG tablet, Take 1 tablet (25 mg) by mouth 3 times daily as needed for itching, Disp: 90 tablet, Rfl: 3     Insulin Disposable Pump (OMNIPOD 5 G6 POD, GEN 5,) MISC, 1 each See Admin Instructions Use per 's instructions., Disp: 1 each, Rfl: 1     Insulin Disposable Pump (OMNIPOD 5 PODS, GEN 5,) MISC, 1 each See Admin Instructions. Change every 2 days. Use for insulin administration., Disp: 45 each, Rfl: 3     Insulin Lispro-aabc (RUSSEL ENGLE) 200 UNIT/ML SOPN, 1 Units by Other route See Admin Instructions. Via insulin pump. Approx 170 units daily plus 2-3 units daily to prime pump and tubing. (Patient taking differently: 1 Units by  Other route once a week. Via insulin pump. Approx 170 units daily plus 2-3 units daily to prime pump and tubing.), Disp: 170 mL, Rfl: 3     insulin pen needle (BD ENE U/F) 32G X 4 MM miscellaneous, Use 1 pen needle 4 times daily or as directed., Disp: 400 each, Rfl: 1     ketorolac (ACULAR) 0.5 % ophthalmic solution, Place 1 drop Into the left eye 4 times daily. Start 2 days prior to surgery four times a day, after surgery: Four times a day x 1 week, three times a day x 1 week, twice a day x 1 week, daily x 1 week then stop, Disp: 10 mL, Rfl: 0     lidocaine (LIDODERM) 5 % patch, Place 1 patch onto the skin every 24 hours To prevent lidocaine toxicity, patient should be patch free for 12 hrs daily., Disp: 30 patch, Rfl: 11     lidocaine (XYLOCAINE) 5 % external ointment, Apply topically as needed for moderate pain, Disp: 90 g, Rfl: 11     losartan (COZAAR) 25 MG tablet, Take 1 tablet (25 mg) by mouth daily., Disp: 90 tablet, Rfl: 3     methocarbamol (ROBAXIN) 500 MG tablet, Take 1 tablet (500 mg) by mouth 2 times daily as needed for muscle spasms., Disp: 60 tablet, Rfl: 11     metoprolol succinate ER (TOPROL XL) 200 MG 24 hr tablet, Take 1 tablet (200 mg) by mouth daily., Disp: 90 tablet, Rfl: 1     moxifloxacin (VIGAMOX) 0.5 % ophthalmic solution, Place 1 drop Into the left eye 4 times daily. Start 2 days prior to surgery four times a day. After Surgery: four times a day  X 1 week, Disp: 3 mL, Rfl: 0     mycophenolic acid (GENERIC EQUIVALENT) 360 MG EC tablet, Take 2 tablets (720 mg) by mouth every 12 hours., Disp: 360 tablet, Rfl: 3     oxyCODONE (ROXICODONE) 5 MG tablet, Take 1 tablet (5 mg) by mouth 4 times daily as needed for pain. maximum 6 tablet(s) per day, Disp: 30 tablet, Rfl: 0     prednisoLONE acetate (PRED FORTE) 1 % ophthalmic suspension, Place 1-2 drops Into the left eye 4 times daily. After surgery: Four times a day x 1 week, three times a day x 1 week, twice a day x 1 week, daily x 1 week then  stop, Disp: 10 mL, Rfl: 0     predniSONE (DELTASONE) 2.5 MG tablet, Take 1 tablet (2.5 mg) by mouth daily., Disp: 90 tablet, Rfl: 3     PROGRAF (BRAND) 1 MG capsule, , Disp: , Rfl:      tacrolimus (GENERIC EQUIVALENT) 1 MG capsule, Take 4 capsules (4 mg) by mouth every morning AND 3 capsules (3 mg) every evening., Disp: 630 capsule, Rfl: 3     Tacrolimus 1 MG PACK, , Disp: , Rfl:      tirzepatide (MOUNJARO) 10 MG/0.5ML SOAJ auto-injector pen, Inject 0.5 mLs (10 mg) subcutaneously once a week., Disp: 2 mL, Rfl: 2     tretinoin (RETIN-A) 0.025 % external cream, Apply a pea-sized amount evenly over the face at nighttime before bed, Disp: 45 g, Rfl: 11     triamcinolone (KENALOG) 0.1 % external ointment, Apply topically 2 times daily to the itching on the legs, Disp: 80 g, Rfl: 1     ursodiol (ACTIGALL) 500 MG tablet, Take 1 tablet (500 mg) by mouth 2 times daily., Disp: 180 tablet, Rfl: 3    Social history:   . Lives alone. Former . 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  Former smoker for 2 years, 0.75 packs/day, quit in 1988.  He used to chew tobacco and he stopped this in 2015.  He has not been drinking any alcohol since the liver transplant.    OBJECTIVE:    Wt Readings from Last 10 Encounters:   03/31/25 119.3 kg (263 lb)   03/19/25 116.9 kg (257 lb 12.8 oz)   03/14/25 117.9 kg (260 lb)   03/06/25 119.6 kg (263 lb 9.6 oz)   03/03/25 119 kg (262 lb 4.8 oz)   11/06/24 124.5 kg (274 lb 6.4 oz)   09/30/24 131.5 kg (290 lb)   09/09/24 130.8 kg (288 lb 4.8 oz)   08/12/24 130.4 kg (287 lb 6.4 oz)   07/10/24 129.3 kg (285 lb)     /65   Pulse 79   Wt 119.3 kg (263 lb)   SpO2 98%   BMI 35.66 kg/m       Physical exam  General appearance: No distress noted, General obesity.  Normal male hair pattern.   Eyes: conjunctivae and extraocular motions are normal. Pupils are equal, round, and reactive to light. No lid lag, no stare.  HENT: oropharynx clear and moist; neck no JVD, no bruits, no  thyromegaly, no palpable nodules  Cardiovascular: regular rhythm, no murmurs, distal pulses palpable, no edema  Respiratory: chest clear, no rales, no rhonchi  Musculoskeletal: normal tone and strength   Neurologic: Absent knee reflexes, ? fine resting tremor of the left hand  Psychiatric: affect and judgment normal     Lab Results   Component Value Date    A1C 5.0 02/28/2025    A1C 6.2 (H) 07/05/2024    A1C 5.8 (H) 03/01/2024    A1C 6.2 (H) 11/30/2023    A1C 5.7 (H) 07/31/2023    A1C 6.3 (H) 03/31/2021    A1C 6.7 (H) 02/12/2021    A1C 6.4 (H) 11/24/2020    A1C 6.2 (H) 10/06/2020    A1C 5.5 04/11/2018    HEMOGLOBINA1 5.3 12/06/2021    HEMOGLOBINA1 5.8 11/18/2019    HEMOGLOBINA1 6.1 (A) 11/19/2018       Hemoglobin   Date Value Ref Range Status   02/28/2025 9.7 (L) 13.3 - 17.7 g/dL Final   05/27/2021 11.9 (L) 13.3 - 17.7 g/dL Final     Hematocrit   Date Value Ref Range Status   02/28/2025 30.6 (L) 40.0 - 53.0 % Final   05/27/2021 34.7 (L) 40.0 - 53.0 % Final     Cholesterol   Date Value Ref Range Status   02/28/2025 112 <200 mg/dL Final   11/24/2020 134 <200 mg/dL Final     Cholesterol/HDL Ratio   Date Value Ref Range Status   07/03/2012 3.8 0.0 - 5.0 Final     HDL Cholesterol   Date Value Ref Range Status   11/24/2020 39 (L) >39 mg/dL Final     Direct Measure HDL   Date Value Ref Range Status   02/28/2025 33 (L) >=40 mg/dL Final     LDL Cholesterol Calculated   Date Value Ref Range Status   02/28/2025 23 <100 mg/dL Final   11/24/2020 35 <100 mg/dL Final     Comment:     Desirable:       <100 mg/dl     VLDL-Cholesterol   Date Value Ref Range Status   07/03/2012 36 (H) 0 - 30 mg/dL Final     Triglycerides   Date Value Ref Range Status   02/28/2025 279 (H) <150 mg/dL Final   11/24/2020 298 (H) <150 mg/dL Final     Comment:     Borderline high:  150-199 mg/dl  High:             200-499 mg/dl  Very high:       >499 mg/dl       Albumin Urine mg/L   Date Value Ref Range Status   10/27/2017 122 mg/L Final     TSH   Date  Value Ref Range Status   04/19/2023 3.12 0.30 - 4.20 uIU/mL Final   04/02/2018 2.74 0.40 - 4.00 mU/L Final     Last Basic Metabolic Panel:    Sodium   Date Value Ref Range Status   02/28/2025 142 135 - 145 mmol/L Final   06/08/2021 134 133 - 144 mmol/L Final     Potassium   Date Value Ref Range Status   02/28/2025 4.6 3.4 - 5.3 mmol/L Final   01/30/2023 5.3 3.4 - 5.3 mmol/L Final   06/08/2021 4.9 3.4 - 5.3 mmol/L Final     Chloride   Date Value Ref Range Status   02/28/2025 109 (H) 98 - 107 mmol/L Final   01/30/2023 114 (H) 94 - 109 mmol/L Final   06/08/2021 105 94 - 109 mmol/L Final     Calcium   Date Value Ref Range Status   02/28/2025 8.7 (L) 8.8 - 10.4 mg/dL Final   06/08/2021 8.9 8.5 - 10.1 mg/dL Final     Carbon Dioxide   Date Value Ref Range Status   06/08/2021 23 20 - 32 mmol/L Final     Carbon Dioxide (CO2)   Date Value Ref Range Status   02/28/2025 21 (L) 22 - 29 mmol/L Final   01/30/2023 22 20 - 32 mmol/L Final     Urea Nitrogen   Date Value Ref Range Status   02/28/2025 37.8 (H) 8.0 - 23.0 mg/dL Final   01/30/2023 48 (H) 7 - 30 mg/dL Final   06/08/2021 43 (H) 7 - 30 mg/dL Final     Creatinine   Date Value Ref Range Status   02/28/2025 1.89 (H) 0.67 - 1.17 mg/dL Final   06/08/2021 1.51 (H) 0.66 - 1.25 mg/dL Final     GFR Estimate   Date Value Ref Range Status   02/28/2025 40 (L) >60 mL/min/1.73m2 Final     Comment:     eGFR calculated using 2021 CKD-EPI equation.   06/08/2021 51 (L) >60 mL/min/[1.73_m2] Final     Comment:     Non  GFR Calc  Starting 12/18/2018, serum creatinine based estimated GFR (eGFR) will be   calculated using the Chronic Kidney Disease Epidemiology Collaboration   (CKD-EPI) equation.       Glucose   Date Value Ref Range Status   02/28/2025 96 70 - 99 mg/dL Final   01/30/2023 78 70 - 99 mg/dL Final   06/08/2021 249 (H) 70 - 99 mg/dL Final     GLUCOSE BY METER POCT   Date Value Ref Range Status   02/01/2024 86 70 - 99 mg/dL Final       AST   Date Value Ref Range Status    02/28/2025 29 0 - 45 U/L Final   05/27/2021 19 0 - 45 U/L Final     ALT   Date Value Ref Range Status   02/28/2025 30 0 - 70 U/L Final   05/27/2021 32 0 - 70 U/L Final     Albumin Urine mg/g Cr   Date Value Ref Range Status   10/27/2017 132.46 (H) 0 - 17 mg/g Cr Final       43 minutes spent on the date of the encounter doing chart review, history and exam, documentation and further activities as noted above, excluding time for CGM review.       Again, thank you for allowing me to participate in the care of your patient.      Sincerely,    Alisa West MD

## 2025-03-31 NOTE — PROGRESS NOTES
ASSESSMENT/PLAN:    Camacho Bhagat is a 59 year old man w/ PMx of CASTAÑEDA/HCC s/p liver transplant (3/2017), fibromyalgia, DVT, HTN, RONNIE, OA, and CVA who presents for follow-up of his DM-II     1. Type 2 Diabetes, complicated by triopathy  Overall, he maintains a very good glycemic control, which has improved since using tirzepatide.  Recommendations:  Consistently bolus 10 to 15 minutes prior to eating, especially prior to breakfast  With the weight loss and decreased insulin resistance, we might need to change the insulin to carbohydrate ratio to 1 unit per 8.5 g, if he notices lower blood glucose numbers within 2 hours of bolusing for food intake.  Transition to Dexcom G7.  Counseled on differences between Dexcom G6 and G7.  Follow-up in 6 months.    2.  Obesity  Responding to treatment with tirzepatide.  He continues to follow-up in the weight management clinic.    3.  Osteopenia  He is only a candidate for Prolia, so treatment was deferred.    Orders Placed This Encounter   Procedures    Continuous Glucose Monitoring >=72 hours PHYS INTERP     =========================================================================================     Camacho Bhagat is a 59 year old male with PMHx of CASTAÑEDA/HCC s/p liver transplant (3/2017), fibromyalgia, CKD, DVT, HTN, RONNIE, OA, who presents for follow-up of type 2 DM, diagnosed in 2002.  Oral meds first, later placed on insulin.     Related history: h/o CASTAÑEDA cirrhosis and HCC s/p liver transplant 3/2017. Course complicated by acute abdomen/neutropenic fever requiring right hemicolectomy and colostomy Fall 2017. He later underwent colostomy takedown 1/5/18 which was complicated by abdominal wall abscess at a drain site. During this course, he has also developed excessive proteinuria.    Hemoglobin A1c was 6.2% on 7/5/24, in the context of anemia. It was 5% on 2/28/25.   I reviewed both insulin pump and sensor records.    Insulin pump (Omnipod 5, May 2023) settings:  Basal rate   12  AM 1.5 units/h  8 AM 1.45 units/h  2 PM 1.4 units/h   6 PM 1.45 units/h   9 PM 1.5 units/h  Insulin to carbohydrate ratio 1 unit per 7.5 g   Sensitivity 25   Blood glucose correction threshold 120  Glucose target range 110  Active insulin time 3 hours    He uses Humalog 200 pens to fill the pump reservoir.     Total daily insulin dose is 47 units, of which 48% is basal insulin.  On the sensor, average glucose is 137, with a standard deviation of 33 and a coefficient of variation of 24%, corresponding to an estimated GMI of 6.6%.  87% of the blood sugar numbers are within target and 1% are in the mild hypoglycemic range.  The pump is used in auto mode 99% of time.    Occasionally, he does enter manual corrections for postprandial hyperglycemia.  He very rarely overrides the pump.  The pod is changed every 3.5 days.  Not all meals/snacks are preceded by a bolus.  In the morning, he sometimes has a mild spike    Treatment with empagliflozin was started on 1/10/24, at 10 mg daily, by nephrology.    In July 2024, he was started on treatment with Tirzepatide, through weight management clinic.  His weight at that time was 285 pounds.  He has been taking it at 10 mg weekly.  Current weight is 263 lbs.   It has improved the BG numbers. He does have longstanding episodes of diarrhea but otherwise he denies nausea, vomiting, abdominal pain or constipation. In the past, he tried Victoza and he was not able to tolerate it due to projectile vomiting.     Related meds:  Prednisone started for transplant and the dose remains 2.5 mg per day.     Diabetes related complications:     1. Retinopathy  Last eye exam was in 2/25. Note reviewed. It revealed mild nonproliferative retinopathy and macular edema, bilaterally. S/P cataract surgery.      2.  Nephropathy: yes. CKD IIIa . He has significant proteinuria and f/up with nephrology.  Recent GFR in the 30s. On losartan, 25 mg daily.      3. Neuropathy.  Gabapentin was prescribed to  control the neuropathic pain in the left foot, and back pain which started over 10 years ago. Current dose 800 mg TID. No ulcers or infection. No amputation.  He has bilateral foot pain. The last appointment with podiatry was in 12/24.      4. Lipids   2/28/25: LDL cholesterol 23, HDL cholesterol 33,    The patient has declined treatment with statins.     In general, he is able to recognize the hypoglycemic episodes: He feels flushed, warm and dizzy.  The lowest blood glucose he remembers experiencing was in the 50s.  He declined having glucagon at home.     LS XRay from 9/13/22 revealed an L1 vertebral compression fracture.  The fracture was not mentioned on the follow-up MRIs with the most recent one done in 3/25.   The patient has a longstanding history of back pain, which started in 1993.  The lumbar spine x-ray was recommended as he complained of hip pain, for which he has been receiving intra-articular steroid injections.    DXA 2/2020  The most negative and valid T-score of -1.8 at the level of the right femoral neck. Compared with baseline 2019 exam there was a significant decrease of BMD at the level of the lumbar spine, left total hip and right total hip.  (-2.6%; -17.4%; -18.2%).     DXA 9/2024   L1-L3 T-score 1.5, left femoral neck T-score -0.7, right femoral neck T-score -1.2, left total hip T-score -0.4, right total hip T-score -0.3, 33% distal radius T-score 0.5.  FRAX 7.5%/1.2%.     Vitamin D 43 on 2/1/24 and 51 (20-50) on 2/28/25  He has been taking 2000 U daily.   PTH 31 on 9/27/22 and 73 on 11/1/24    Past Medical History  Past Medical History:   Diagnosis Date    Cancer (H) 12/15    Stage 4 liver cancer    Diabetes type 2, controlled (H) 11/10/2016    Esophageal reflux     Fibromyalgia 01/2009    dx with Dr Benitez( Rheum)    Gangrene of finger (H) 08/25/2017    H/O deep venous thrombosis 11/2001    Permanent IVC filter in place.    H/O CASTAÑEDA (nonalcoholic steatohepatitis)     H/O Pneumonia,  organism unspecified(486) 10/2001    Included ARDS, sepsis, and  acute renal failure; hospitalized, required tracheostomy placement.    H/O: HTN (hypertension) 11/2001    No longer prescribed antihypertensive medication.      History of hepatocellular carcinoma     History of liver transplant (H)     History of obstructive sleep apnea     No longer wears CPAP since losing approximately 200 pounds with his liver transplant and its complications.      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    Osteoarthritis     Presence of PERMANENT IVC filter     Rheumatoid arthritis(714.0)     remission for many years    Squamous cell skin cancer     on back   Osteopenia 2020    Past Surgical History  Past Surgical History:   Procedure Laterality Date    ARTHROSCOPY KNEE WITH MENISCAL REPAIR Right 2008    Right knee arthroscopy    BENCH LIVER N/A 03/04/2017    Procedure: BENCH LIVER;  Surgeon: Jovan Tran MD;  Location: [facility]    BIOPSY  5/2017    Liver    CHOLECYSTECTOMY      COLONOSCOPY N/A 07/21/2017    Procedure: COMBINED COLONOSCOPY, SINGLE OR MULTIPLE BIOPSY/POLYPECTOMY BY BIOPSY;  Colonoscopy;  Surgeon: Izaiah Montes MD;  Location: [facility]    COLONOSCOPY N/A 10/24/2022    Procedure: COLONOSCOPY, WITH POLYPECTOMY AND BIOPSY;  Surgeon: Abril Mcneill MD;  Location: [facility]    ENDOSCOPIC RETROGRADE CHOLANGIOPANCREATOGRAM N/A 05/22/2018    Procedure: COMBINED ENDOSCOPIC RETROGRADE CHOLANGIOPANCREATOGRAPHY, PLACE TUBE/STENT;  Endoscopic Retrograde Cholangiopancreatography with sphincterotomy and pancreatic duct stent placement;  Surgeon: Zay Gaitan MD;  Location: [facility]    ENT SURGERY      Repair of deviated septum    ESOPHAGOSCOPY, GASTROSCOPY, DUODENOSCOPY (EGD), COMBINED N/A 08/04/2016    Procedure: COMBINED  ESOPHAGOSCOPY, GASTROSCOPY, DUODENOSCOPY (EGD), BIOPSY SINGLE OR MULTIPLE;  Surgeon: Trent Pederson MD;  Location: [facility]    ESOPHAGOSCOPY, GASTROSCOPY, DUODENOSCOPY (EGD), COMBINED N/A 08/27/2017    Procedure: COMBINED ESOPHAGOSCOPY, GASTROSCOPY, DUODENOSCOPY (EGD);;  Surgeon: Los Wynn MD;  Location: [facility]    ESOPHAGOSCOPY, GASTROSCOPY, DUODENOSCOPY (EGD), COMBINED N/A 08/17/2023    Procedure: Esophagoscopy, gastroscopy, duodenoscopy (EGD), combined;  Surgeon: Trent Villanueva MD;  Location: [facility]    INCISION AND DRAINAGE ABDOMEN WASHOUT, COMBINED Right 02/14/2018    Procedure: COMBINED INCISION AND DRAINAGE ABDOMEN WASHOUT;  COMBINED INCISION AND DRAINAGE right ABDOMEN flank;  Surgeon: Sara Dinh MD;  [facility]    INTRAVITREAL INJECTION GAS/TPA/METHOTREXATE/ANTIBIOTICS Left 3/14/2025    Procedure: INJECTION, PHARMACOLOGIC AGENT, INTRAVITREAL;  Surgeon: Stan Roque MD;  Location: [facility]    LAPAROTOMY EXPLORATORY N/A 07/04/2017    Procedure: LAPAROTOMY EXPLORATORY;  Exploratory Laparotomy, washout;  Surgeon: Tip Zhang MD;  [facility]    LAPAROTOMY EXPLORATORY N/A 07/05/2017    Procedure: LAPAROTOMY EXPLORATORY;  Exploratory Laparotomy, Washout with closure.;  Surgeon: Tip Zhang MD;  [facility]: [facility]    LAPAROTOMY EXPLORATORY N/A 07/26/2017    Procedure: LAPAROTOMY EXPLORATORY;  Exploratory Laparotomy, Right angelique-colectomy, end ileostomy, mucosal fistula, partial omentectomy;  Surgeon: Sara Dinh MD;  Location: [facility]    LASIK      ORTHOPEDIC SURGERY Right     Repair of dislocated wrist.      PHACOEMULSIFICATION CLEAR CORNEA WITH STANDARD INTRAOCULAR LENS IMPLANT Right 2/1/2024    Procedure: RIGHT EYE PHACOEMULSIFICATION, COMPLEX CATARACT, WITH INTRAOCULAR LENS IMPLANT;  Surgeon: Stan Roque MD;  Location: [facility]    PHACOEMULSIFICATION WITH STANDARD INTRAOCULAR LENS IMPLANT Left 3/14/2025    Procedure:  LEFT EYE PHACOEMULSIFICATION, CATARACT, WITH STANDARD INTRAOCULAR LENS IMPLANT INSERTION;  Surgeon: Stan Roque MD;  Location: [facility]    RELEASE TRIGGER FINGER Right 11/10/2017    Procedure: RELEASE TRIGGER FINGER;  Debride, V-Y Flap Right Index Finger;  Surgeon: Momo Noonan MD;  [facility]    TAKEDOWN ILEOSTOMY N/A 2018    Procedure: TAKEDOWN ILEOSTOMY;  Exploratory Laparotomy, Lysis of Adhesions, Takedown Of End Ileostomy, Takedown of mucocutaneous fistula, ileocolic resection  And Colorectal Anastomosis, Primary repair of Ventral Hernia, Anesthesia Block ;  Surgeon: Sara Dinh MD;  Location: [facility]    TRACHEOSTOMY  2001    During hospitalization for pneumonia.      TRANSPLANT LIVER RECIPIENT  DONOR N/A 2017    Procedure: TRANSPLANT LIVER RECIPIENT  DONOR;  Surgeon: Jovan Tran MD;  Location: [facility]      Medications    Current Outpatient Medications:     alcohol swab prep pads, Use to swab area of injection/francisca as directed., Disp: 100 each, Rfl: 3    alpha-lipoic acid 600 MG capsule, Take 600 mg by mouth, Disp: , Rfl:     amLODIPine (NORVASC) 10 MG tablet, Take 1 tablet (10 mg) by mouth daily., Disp: 90 tablet, Rfl: 3    augmented betamethasone dipropionate (DIPROLENE-AF) 0.05 % external ointment, Apply twice daily as needed for rash on the legs, Disp: 45 g, Rfl: 11    cholecalciferol (VITAMIN D3) 1000 UNIT tablet, Take 1 tablet (1,000 Units) by mouth daily, Disp: 100 tablet, Rfl: 3    Continuous Glucose Sensor (DEXCOM G6 SENSOR) MISC, Change every 10 days., Disp: 9 each, Rfl: 3    Continuous Glucose Transmitter (DEXCOM G6 TRANSMITTER) MISC, Change every 3 months., Disp: 1 each, Rfl: 3    cyclobenzaprine (FLEXERIL) 10 MG tablet, Take 1 tablet (10 mg) by mouth 3 times daily as needed for muscle spasms, Disp: 90 tablet, Rfl: 3    diazepam (VALIUM) 2 MG tablet, Take 1 tablet (2 mg) by mouth 2 times daily as needed for muscle  spasms., Disp: 20 tablet, Rfl: 0    doxazosin (CARDURA) 8 MG tablet, Take 1 tablet (8 mg) by mouth at bedtime. Take a total of 10 mg at bedtime, Disp: 90 tablet, Rfl: 3    empagliflozin (JARDIANCE) 10 MG TABS tablet, Take 1 tablet (10 mg) by mouth daily., Disp: 90 tablet, Rfl: 1    fluticasone (FLONASE) 50 MCG/ACT nasal spray, Spray 1 spray into both nostrils daily, Disp: 20 mL, Rfl: 3    furosemide (LASIX) 40 MG tablet, Take 40 mg daily and OK to take an extra for LE edema., Disp: 180 tablet, Rfl: 3    gabapentin (NEURONTIN) 800 MG tablet, Take 1 tablet (800 mg) by mouth 3 times daily., Disp: 90 tablet, Rfl: 1    hydrALAZINE (APRESOLINE) 50 MG tablet, Take 1 tablet (50 mg) by mouth 2 times daily., Disp: 180 tablet, Rfl: 3    hydrOXYzine (ATARAX) 25 MG tablet, Take 1 tablet (25 mg) by mouth 3 times daily as needed for itching, Disp: 90 tablet, Rfl: 3    Insulin Disposable Pump (OMNIPOD 5 G6 POD, GEN 5,) MISC, 1 each See Admin Instructions Use per 's instructions., Disp: 1 each, Rfl: 1    Insulin Disposable Pump (OMNIPOD 5 PODS, GEN 5,) MISC, 1 each See Admin Instructions. Change every 2 days. Use for insulin administration., Disp: 45 each, Rfl: 3    Insulin Lispro-aabc (RUSSEL ENGLE) 200 UNIT/ML SOPN, 1 Units by Other route See Admin Instructions. Via insulin pump. Approx 170 units daily plus 2-3 units daily to prime pump and tubing. (Patient taking differently: 1 Units by Other route once a week. Via insulin pump. Approx 170 units daily plus 2-3 units daily to prime pump and tubing.), Disp: 170 mL, Rfl: 3    insulin pen needle (BD ENE U/F) 32G X 4 MM miscellaneous, Use 1 pen needle 4 times daily or as directed., Disp: 400 each, Rfl: 1    ketorolac (ACULAR) 0.5 % ophthalmic solution, Place 1 drop Into the left eye 4 times daily. Start 2 days prior to surgery four times a day, after surgery: Four times a day x 1 week, three times a day x 1 week, twice a day x 1 week, daily x 1 week then stop, Disp: 10  mL, Rfl: 0    lidocaine (LIDODERM) 5 % patch, Place 1 patch onto the skin every 24 hours To prevent lidocaine toxicity, patient should be patch free for 12 hrs daily., Disp: 30 patch, Rfl: 11    lidocaine (XYLOCAINE) 5 % external ointment, Apply topically as needed for moderate pain, Disp: 90 g, Rfl: 11    losartan (COZAAR) 25 MG tablet, Take 1 tablet (25 mg) by mouth daily., Disp: 90 tablet, Rfl: 3    methocarbamol (ROBAXIN) 500 MG tablet, Take 1 tablet (500 mg) by mouth 2 times daily as needed for muscle spasms., Disp: 60 tablet, Rfl: 11    metoprolol succinate ER (TOPROL XL) 200 MG 24 hr tablet, Take 1 tablet (200 mg) by mouth daily., Disp: 90 tablet, Rfl: 1    moxifloxacin (VIGAMOX) 0.5 % ophthalmic solution, Place 1 drop Into the left eye 4 times daily. Start 2 days prior to surgery four times a day. After Surgery: four times a day  X 1 week, Disp: 3 mL, Rfl: 0    mycophenolic acid (GENERIC EQUIVALENT) 360 MG EC tablet, Take 2 tablets (720 mg) by mouth every 12 hours., Disp: 360 tablet, Rfl: 3    oxyCODONE (ROXICODONE) 5 MG tablet, Take 1 tablet (5 mg) by mouth 4 times daily as needed for pain. maximum 6 tablet(s) per day, Disp: 30 tablet, Rfl: 0    prednisoLONE acetate (PRED FORTE) 1 % ophthalmic suspension, Place 1-2 drops Into the left eye 4 times daily. After surgery: Four times a day x 1 week, three times a day x 1 week, twice a day x 1 week, daily x 1 week then stop, Disp: 10 mL, Rfl: 0    predniSONE (DELTASONE) 2.5 MG tablet, Take 1 tablet (2.5 mg) by mouth daily., Disp: 90 tablet, Rfl: 3    PROGRAF (BRAND) 1 MG capsule, , Disp: , Rfl:     tacrolimus (GENERIC EQUIVALENT) 1 MG capsule, Take 4 capsules (4 mg) by mouth every morning AND 3 capsules (3 mg) every evening., Disp: 630 capsule, Rfl: 3    Tacrolimus 1 MG PACK, , Disp: , Rfl:     tirzepatide (MOUNJARO) 10 MG/0.5ML SOAJ auto-injector pen, Inject 0.5 mLs (10 mg) subcutaneously once a week., Disp: 2 mL, Rfl: 2    tretinoin (RETIN-A) 0.025 % external  cream, Apply a pea-sized amount evenly over the face at nighttime before bed, Disp: 45 g, Rfl: 11    triamcinolone (KENALOG) 0.1 % external ointment, Apply topically 2 times daily to the itching on the legs, Disp: 80 g, Rfl: 1    ursodiol (ACTIGALL) 500 MG tablet, Take 1 tablet (500 mg) by mouth 2 times daily., Disp: 180 tablet, Rfl: 3    Social history:   . Lives alone. Former . 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  Former smoker for 2 years, 0.75 packs/day, quit in 1988.  He used to chew tobacco and he stopped this in 2015.  He has not been drinking any alcohol since the liver transplant.    OBJECTIVE:    Wt Readings from Last 10 Encounters:   03/31/25 119.3 kg (263 lb)   03/19/25 116.9 kg (257 lb 12.8 oz)   03/14/25 117.9 kg (260 lb)   03/06/25 119.6 kg (263 lb 9.6 oz)   03/03/25 119 kg (262 lb 4.8 oz)   11/06/24 124.5 kg (274 lb 6.4 oz)   09/30/24 131.5 kg (290 lb)   09/09/24 130.8 kg (288 lb 4.8 oz)   08/12/24 130.4 kg (287 lb 6.4 oz)   07/10/24 129.3 kg (285 lb)     /65   Pulse 79   Wt 119.3 kg (263 lb)   SpO2 98%   BMI 35.66 kg/m       Physical exam  General appearance: No distress noted, General obesity.  Normal male hair pattern.   Eyes: conjunctivae and extraocular motions are normal. Pupils are equal, round, and reactive to light. No lid lag, no stare.  HENT: oropharynx clear and moist; neck no JVD, no bruits, no thyromegaly, no palpable nodules  Cardiovascular: regular rhythm, no murmurs, distal pulses palpable, no edema  Respiratory: chest clear, no rales, no rhonchi  Musculoskeletal: normal tone and strength   Neurologic: Absent knee reflexes, ? fine resting tremor of the left hand  Psychiatric: affect and judgment normal     Lab Results   Component Value Date    A1C 5.0 02/28/2025    A1C 6.2 (H) 07/05/2024    A1C 5.8 (H) 03/01/2024    A1C 6.2 (H) 11/30/2023    A1C 5.7 (H) 07/31/2023    A1C 6.3 (H) 03/31/2021    A1C 6.7 (H) 02/12/2021    A1C 6.4 (H) 11/24/2020     A1C 6.2 (H) 10/06/2020    A1C 5.5 04/11/2018    HEMOGLOBINA1 5.3 12/06/2021    HEMOGLOBINA1 5.8 11/18/2019    HEMOGLOBINA1 6.1 (A) 11/19/2018       Hemoglobin   Date Value Ref Range Status   02/28/2025 9.7 (L) 13.3 - 17.7 g/dL Final   05/27/2021 11.9 (L) 13.3 - 17.7 g/dL Final     Hematocrit   Date Value Ref Range Status   02/28/2025 30.6 (L) 40.0 - 53.0 % Final   05/27/2021 34.7 (L) 40.0 - 53.0 % Final     Cholesterol   Date Value Ref Range Status   02/28/2025 112 <200 mg/dL Final   11/24/2020 134 <200 mg/dL Final     Cholesterol/HDL Ratio   Date Value Ref Range Status   07/03/2012 3.8 0.0 - 5.0 Final     HDL Cholesterol   Date Value Ref Range Status   11/24/2020 39 (L) >39 mg/dL Final     Direct Measure HDL   Date Value Ref Range Status   02/28/2025 33 (L) >=40 mg/dL Final     LDL Cholesterol Calculated   Date Value Ref Range Status   02/28/2025 23 <100 mg/dL Final   11/24/2020 35 <100 mg/dL Final     Comment:     Desirable:       <100 mg/dl     VLDL-Cholesterol   Date Value Ref Range Status   07/03/2012 36 (H) 0 - 30 mg/dL Final     Triglycerides   Date Value Ref Range Status   02/28/2025 279 (H) <150 mg/dL Final   11/24/2020 298 (H) <150 mg/dL Final     Comment:     Borderline high:  150-199 mg/dl  High:             200-499 mg/dl  Very high:       >499 mg/dl       Albumin Urine mg/L   Date Value Ref Range Status   10/27/2017 122 mg/L Final     TSH   Date Value Ref Range Status   04/19/2023 3.12 0.30 - 4.20 uIU/mL Final   04/02/2018 2.74 0.40 - 4.00 mU/L Final     Last Basic Metabolic Panel:    Sodium   Date Value Ref Range Status   02/28/2025 142 135 - 145 mmol/L Final   06/08/2021 134 133 - 144 mmol/L Final     Potassium   Date Value Ref Range Status   02/28/2025 4.6 3.4 - 5.3 mmol/L Final   01/30/2023 5.3 3.4 - 5.3 mmol/L Final   06/08/2021 4.9 3.4 - 5.3 mmol/L Final     Chloride   Date Value Ref Range Status   02/28/2025 109 (H) 98 - 107 mmol/L Final   01/30/2023 114 (H) 94 - 109 mmol/L Final   06/08/2021 105  94 - 109 mmol/L Final     Calcium   Date Value Ref Range Status   02/28/2025 8.7 (L) 8.8 - 10.4 mg/dL Final   06/08/2021 8.9 8.5 - 10.1 mg/dL Final     Carbon Dioxide   Date Value Ref Range Status   06/08/2021 23 20 - 32 mmol/L Final     Carbon Dioxide (CO2)   Date Value Ref Range Status   02/28/2025 21 (L) 22 - 29 mmol/L Final   01/30/2023 22 20 - 32 mmol/L Final     Urea Nitrogen   Date Value Ref Range Status   02/28/2025 37.8 (H) 8.0 - 23.0 mg/dL Final   01/30/2023 48 (H) 7 - 30 mg/dL Final   06/08/2021 43 (H) 7 - 30 mg/dL Final     Creatinine   Date Value Ref Range Status   02/28/2025 1.89 (H) 0.67 - 1.17 mg/dL Final   06/08/2021 1.51 (H) 0.66 - 1.25 mg/dL Final     GFR Estimate   Date Value Ref Range Status   02/28/2025 40 (L) >60 mL/min/1.73m2 Final     Comment:     eGFR calculated using 2021 CKD-EPI equation.   06/08/2021 51 (L) >60 mL/min/[1.73_m2] Final     Comment:     Non  GFR Calc  Starting 12/18/2018, serum creatinine based estimated GFR (eGFR) will be   calculated using the Chronic Kidney Disease Epidemiology Collaboration   (CKD-EPI) equation.       Glucose   Date Value Ref Range Status   02/28/2025 96 70 - 99 mg/dL Final   01/30/2023 78 70 - 99 mg/dL Final   06/08/2021 249 (H) 70 - 99 mg/dL Final     GLUCOSE BY METER POCT   Date Value Ref Range Status   02/01/2024 86 70 - 99 mg/dL Final       AST   Date Value Ref Range Status   02/28/2025 29 0 - 45 U/L Final   05/27/2021 19 0 - 45 U/L Final     ALT   Date Value Ref Range Status   02/28/2025 30 0 - 70 U/L Final   05/27/2021 32 0 - 70 U/L Final     Albumin Urine mg/g Cr   Date Value Ref Range Status   10/27/2017 132.46 (H) 0 - 17 mg/g Cr Final       43 minutes spent on the date of the encounter doing chart review, history and exam, documentation and further activities as noted above, excluding time for CGM review.

## 2025-04-01 ENCOUNTER — TELEPHONE (OUTPATIENT)
Dept: ANESTHESIOLOGY | Facility: CLINIC | Age: 60
End: 2025-04-01
Payer: COMMERCIAL

## 2025-04-01 NOTE — TELEPHONE ENCOUNTER
Phoned the patient and left VM. Stated to call the pain clinic to schedule injection procedure at [phone].

## 2025-04-01 NOTE — TELEPHONE ENCOUNTER
Phoned the patient back and left VM. Stated to call pain clinic to schedule injection procedure at [phone].

## 2025-04-02 ENCOUNTER — TELEPHONE (OUTPATIENT)
Dept: ANESTHESIOLOGY | Facility: CLINIC | Age: 60
End: 2025-04-02
Payer: COMMERCIAL

## 2025-04-02 PROBLEM — M54.16 LUMBAR RADICULOPATHY: Status: ACTIVE | Noted: 2025-03-28

## 2025-04-02 NOTE — TELEPHONE ENCOUNTER
RN reviewed patient chart. Pre procedure instructions were sent to the patient.    Vickie Sin RNCC

## 2025-04-02 NOTE — TELEPHONE ENCOUNTER
Called patient to schedule procedure with Dr. Colon    Date of Procedure: 4/30/25    Arrival time given: Yes: Arrival Time 1pm       Procedure Location: [facility]     Verified Location with Patient:  Yes  Address provided to the patient     Pre-op H&P Required:  No: Local anesthesia        Post-Op/Follow Up Appt:  Not Indicated in Request      Informed patient they will need a  to drive them home:  Yes    Patients : Spouse     Patient is aware that pre-op RN from the procedure center will call 2-3 days prior to scheduled procedure to confirm arrival time and review any instructions:  Yes       Additional Comments: N/A        Jodie Barraza MA on 4/2/2025 at 10:34 AM      P: [phone]

## 2025-04-07 ENCOUNTER — TELEPHONE (OUTPATIENT)
Dept: ENDOCRINOLOGY | Facility: CLINIC | Age: 60
End: 2025-04-07
Payer: COMMERCIAL

## 2025-04-07 DIAGNOSIS — E11.3213 TYPE 2 DIABETES MELLITUS WITH BOTH EYES AFFECTED BY MILD NONPROLIFERATIVE RETINOPATHY AND MACULAR EDEMA, WITH LONG-TERM CURRENT USE OF INSULIN (H): Primary | ICD-10-CM

## 2025-04-07 DIAGNOSIS — Z79.4 TYPE 2 DIABETES MELLITUS WITH BOTH EYES AFFECTED BY MILD NONPROLIFERATIVE RETINOPATHY AND MACULAR EDEMA, WITH LONG-TERM CURRENT USE OF INSULIN (H): Primary | ICD-10-CM

## 2025-04-07 PROCEDURE — 2022F DILAT RTA XM EVC RTNOPTHY: CPT | Performed by: OPHTHALMOLOGY

## 2025-04-07 NOTE — TELEPHONE ENCOUNTER
Left Voicemail (1st Attempt) and Sent Mychart (1st Attempt) for the patient to call back and schedule the following:    Appointment type: Return Weight Management  Appointment mode: In Person or Virtual Visit  Provider: Jennifer Vieira NP  Return date: Approx. 9/19/25  Specialty phone number: [phone]    Additional Notes:

## 2025-04-12 ENCOUNTER — HEALTH MAINTENANCE LETTER (OUTPATIENT)
Age: 60
End: 2025-04-12

## 2025-04-14 ENCOUNTER — TELEPHONE (OUTPATIENT)
Dept: OPHTHALMOLOGY | Facility: CLINIC | Age: 60
End: 2025-04-14
Payer: COMMERCIAL

## 2025-04-14 NOTE — TELEPHONE ENCOUNTER
Phone call from patient needing to reschedule missed post op appointment .     Appointment rescheduled to 5/7/25 at 2:30 pm           Cristiano Haines on 4/14/2025 at 10:47 AM

## 2025-04-21 PROBLEM — L08.9 SKIN INFECTION: Status: ACTIVE | Noted: 2024-10-30

## 2025-04-21 PROBLEM — C44.92 SCC (SQUAMOUS CELL CARCINOMA): Status: ACTIVE | Noted: 2024-10-23

## 2025-05-06 ENCOUNTER — OFFICE VISIT (OUTPATIENT)
Dept: NEUROSURGERY | Facility: CLINIC | Age: 60
End: 2025-05-06
Attending: PHYSICIAN ASSISTANT
Payer: COMMERCIAL

## 2025-05-06 ENCOUNTER — PRE VISIT (OUTPATIENT)
Dept: NEUROSURGERY | Facility: CLINIC | Age: 60
End: 2025-05-06

## 2025-05-06 VITALS — OXYGEN SATURATION: 98 % | DIASTOLIC BLOOD PRESSURE: 70 MMHG | SYSTOLIC BLOOD PRESSURE: 133 MMHG | HEART RATE: 61 BPM

## 2025-05-06 DIAGNOSIS — M54.6 CHRONIC BILATERAL THORACIC BACK PAIN: Primary | ICD-10-CM

## 2025-05-06 DIAGNOSIS — M54.50 CHRONIC MIDLINE LOW BACK PAIN WITHOUT SCIATICA: ICD-10-CM

## 2025-05-06 DIAGNOSIS — M54.6 CHRONIC BILATERAL THORACIC BACK PAIN: ICD-10-CM

## 2025-05-06 DIAGNOSIS — G89.29 CHRONIC MIDLINE LOW BACK PAIN WITHOUT SCIATICA: ICD-10-CM

## 2025-05-06 DIAGNOSIS — H35.033 HYPERTENSIVE RETINOPATHY OF BOTH EYES: ICD-10-CM

## 2025-05-06 DIAGNOSIS — G89.29 CHRONIC BILATERAL THORACIC BACK PAIN: Primary | ICD-10-CM

## 2025-05-06 DIAGNOSIS — M47.816 LUMBAR SPONDYLOSIS: ICD-10-CM

## 2025-05-06 DIAGNOSIS — Z96.1 PSEUDOPHAKIA, LEFT EYE: Primary | ICD-10-CM

## 2025-05-06 DIAGNOSIS — G89.29 CHRONIC BILATERAL THORACIC BACK PAIN: ICD-10-CM

## 2025-05-06 DIAGNOSIS — G58.9 NERVE DISORDER: ICD-10-CM

## 2025-05-06 NOTE — LETTER
5/6/2025       RE: Camacho Bhagat  [address]  [facility] [address]     Dear Colleague,    Thank you for referring your patient, Camacho Bhagat, to the [facility] [place] at [facility]. Please see a copy of my visit note below.    I had the opportunity to see Mr. Bhagat today in my clinic today.  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  Because of his ongoing pain in his back which he has had for many years he had undergone MRIs of his spine including his thoracic region.  On the scan that was done on March 12th this year, multiple bilateral cysts or nodules are seen along the thoracic nurses they went underneath the ribs.  Radiologist had raised the question of whether these could be multiple schwannomas or neoplastic process.  On the subsequent scan done 9 days later, these lesions appear to have disappeared.  Mr. Bhagat says that during this period of time there has not been any change in his symptoms.  He has a very complex medical history including liver transplant CMV infection, colitis, polyneuropathy, diabetes mellitus and carpal tunnel syndrome.    On examination the patient has 5+ strength in his upper extremity except for wrist dorsiflexion on the left.  He has a scar on his right palm similar to a carpal tunnel surgery incision.  He has 5+ strength in his lower extremities sensation to light touch is intact on all 4 extremities.    The MRI on my viewing demonstrates these T2 hyperintensities which suggest that they may be cystic in nature.  Why they have disappeared over a short period time is unclear to me.  Because of the patient's complicated medical history, it would be important to follow these over time.  I will therefore obtain another MRI in a year with a return  visit with the patient.  I also informed him of the type of symptoms that can emanate from lesions in the thoracic nerve particularly describing for him the radicular patterns.  Thank you very much for allowing me to see this very pleasant patient.      Again, thank you for allowing me to participate in the care of your patient.      Sincerely,    Vern Moran MD

## 2025-05-06 NOTE — PROGRESS NOTES
Referral for trial of acupuncture for treatment of chronic back pain. Repeat as needed.    Gisselle Mancilla PA-C     Consent 3/Introductory Paragraph: I gave the patient a chance to ask questions they had about the procedure.  Following this I explained the Mohs procedure and consent was obtained. The risks, benefits and alternatives to therapy were discussed in detail. Specifically, the risks of infection, scarring, bleeding, prolonged wound healing, incomplete removal, allergy to anesthesia, nerve injury and recurrence were addressed. Prior to the procedure, the treatment site was clearly identified and confirmed by the patient. All components of Universal Protocol/PAUSE Rule completed.

## 2025-05-06 NOTE — PROGRESS NOTES
I had the opportunity to see Mr. Bhagat today in my clinic today.  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  Because of his ongoing pain in his back which he has had for many years he had undergone MRIs of his spine including his thoracic region.  On the scan that was done on March 12th this year, multiple bilateral cysts or nodules are seen along the thoracic nurses they went underneath the ribs.  Radiologist had raised the question of whether these could be multiple schwannomas or neoplastic process.  On the subsequent scan done 9 days later, these lesions appear to have disappeared.  Mr. Bhagat says that during this period of time there has not been any change in his symptoms.  He has a very complex medical history including liver transplant CMV infection, colitis, polyneuropathy, diabetes mellitus and carpal tunnel syndrome.    On examination the patient has 5+ strength in his upper extremity except for wrist dorsiflexion on the left.  He has a scar on his right palm similar to a carpal tunnel surgery incision.  He has 5+ strength in his lower extremities sensation to light touch is intact on all 4 extremities.    The MRI on my viewing demonstrates these T2 hyperintensities which suggest that they may be cystic in nature.  Why they have disappeared over a short period time is unclear to me.  Because of the patient's complicated medical history, it would be important to follow these over time.  I will therefore obtain another MRI in a year with a return visit with the patient.  I also informed him of the type of symptoms that can emanate from lesions in the thoracic nerve particularly describing for him the radicular patterns.  Thank you very much for allowing me to see this very pleasant patient.

## 2025-05-06 NOTE — PATIENT INSTRUCTIONS
Thank you for choosing [facility]. You were seen in the Neurosurgery Clinic today with Dr. Moran.    Next steps:  Return to clinic in 1 year for follow up with Dr. Moran.  Please complete MRI prior to clinic visit.     Please don't hesitate to reach out via MyChart or telephone if you have any questions after your visit.    Teresita Villasenor RN  RN Care Coordinator, Neurosurgery    Direct: [phone]  Neurosurgery Clinic: [phone]

## 2025-05-07 ENCOUNTER — OFFICE VISIT (OUTPATIENT)
Dept: OPHTHALMOLOGY | Facility: CLINIC | Age: 60
End: 2025-05-07
Attending: OPHTHALMOLOGY
Payer: COMMERCIAL

## 2025-05-07 ENCOUNTER — OFFICE VISIT (OUTPATIENT)
Dept: ORTHOPEDICS | Facility: CLINIC | Age: 60
End: 2025-05-07
Payer: COMMERCIAL

## 2025-05-07 DIAGNOSIS — E11.49 TYPE II OR UNSPECIFIED TYPE DIABETES MELLITUS WITH NEUROLOGICAL MANIFESTATIONS, NOT STATED AS UNCONTROLLED(250.60) (H): ICD-10-CM

## 2025-05-07 DIAGNOSIS — L60.2 LONG TOENAIL: Primary | ICD-10-CM

## 2025-05-07 DIAGNOSIS — Z96.1 PSEUDOPHAKIA, LEFT EYE: ICD-10-CM

## 2025-05-07 DIAGNOSIS — H35.352 CYSTOID MACULAR EDEMA, LEFT: Primary | ICD-10-CM

## 2025-05-07 DIAGNOSIS — H35.033 HYPERTENSIVE RETINOPATHY OF BOTH EYES: ICD-10-CM

## 2025-05-07 PROCEDURE — 99024 POSTOP FOLLOW-UP VISIT: CPT | Performed by: OPHTHALMOLOGY

## 2025-05-07 PROCEDURE — 99213 OFFICE O/P EST LOW 20 MIN: CPT | Performed by: OPHTHALMOLOGY

## 2025-05-07 PROCEDURE — 92015 DETERMINE REFRACTIVE STATE: CPT

## 2025-05-07 RX ORDER — DICLOFENAC SODIUM 1 MG/ML
1 SOLUTION/ DROPS OPHTHALMIC 4 TIMES DAILY
Qty: 5 ML | Refills: 4 | Status: SHIPPED | OUTPATIENT
Start: 2025-05-07

## 2025-05-07 ASSESSMENT — SLIT LAMP EXAM - LIDS
COMMENTS: NORMAL
COMMENTS: NORMAL

## 2025-05-07 ASSESSMENT — REFRACTION_MANIFEST
OS_ADD: +2.50
OS_CYLINDER: +0.25
OD_CYLINDER: +0.75
OD_ADD: +2.50
OS_AXIS: 157
OS_SPHERE: +0.25
OD_SPHERE: -0.75
OD_AXIS: 036

## 2025-05-07 ASSESSMENT — CUP TO DISC RATIO: OS_RATIO: 0.1

## 2025-05-07 ASSESSMENT — VISUAL ACUITY
OD_SC: 20/25
OS_SC: 20/25
OS_SC+: -2
METHOD: SNELLEN - LINEAR

## 2025-05-07 ASSESSMENT — TONOMETRY
IOP_METHOD: ICARE
OS_IOP_MMHG: 14
OD_IOP_MMHG: 14

## 2025-05-07 ASSESSMENT — EXTERNAL EXAM - RIGHT EYE: OD_EXAM: NORMAL

## 2025-05-07 NOTE — LETTER
5/7/2025      Camacho Bhagat  [address] [facility] [address]      Dear Colleague,    Thank you for referring your patient, Camacho Bhagat, to the [facility] [place]. Please see a copy of my visit note below.        Chief Complaint:   Chief Complaint   Patient presents with     Follow Up     Diabetic foot care.         HPI: Camacho is a 59 year old male who presents today for a diabetic foot eval and management. He has been diabetic for years. Has liver transplant. Notes that he is having pain in the outside of both feet. Has diabetic shoes and inserts.     Interval hx: he is doing well. He has not trimmed his nails.     Review of Systems: No n/v/d/f/c/ns/sob/cp    PMH:   Past Medical History:   Diagnosis Date     Cancer (H) 12/15    Stage 4 liver cancer     Diabetes type 2, controlled (H) 11/10/2016     Esophageal reflux      Fibromyalgia 01/2009    dx with Dr Benitez( Rheum)     Gangrene of finger (H) 08/25/2017     H/O deep venous thrombosis 11/2001    Permanent IVC filter in place.     H/O CASTAÑEDA (nonalcoholic steatohepatitis)      H/O Pneumonia, organism unspecified(486) 10/2001    Included ARDS, sepsis, and  acute renal failure; hospitalized, required tracheostomy placement.     H/O: HTN (hypertension) 11/2001    No longer prescribed antihypertensive medication.       History of hepatocellular carcinoma      History of liver transplant (H)      History of obstructive sleep apnea     No longer wears CPAP since losing approximately 200 pounds with his liver transplant and its complications.       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     Osteoarthritis      Presence of PERMANENT IVC filter      Rheumatoid arthritis(714.0)     remission for many years     SCC (squamous cell carcinoma)  10/23/2024     Skin infection 10/30/2024     Squamous cell skin cancer     on back       PSxH:   Past Surgical History:   Procedure Laterality Date     ARTHROSCOPY KNEE WITH MENISCAL REPAIR Right 2008    Right knee arthroscopy     BENCH LIVER N/A 03/04/2017    Procedure: BENCH LIVER;  Surgeon: Jovan Tran MD;  Location: [facility] OR     BIOPSY  5/2017    Liver     CHOLECYSTECTOMY       COLONOSCOPY N/A 07/21/2017    Procedure: COMBINED COLONOSCOPY, SINGLE OR MULTIPLE BIOPSY/POLYPECTOMY BY BIOPSY;  Colonoscopy;  Surgeon: Izaiah Montes MD;  Location:  GI     COLONOSCOPY N/A 10/24/2022    Procedure: COLONOSCOPY, WITH POLYPECTOMY AND BIOPSY;  Surgeon: Abril Mcneill MD;  Location:  GI     ENDOSCOPIC RETROGRADE CHOLANGIOPANCREATOGRAM N/A 05/22/2018    Procedure: COMBINED ENDOSCOPIC RETROGRADE CHOLANGIOPANCREATOGRAPHY, PLACE TUBE/STENT;  Endoscopic Retrograde Cholangiopancreatography with sphincterotomy and pancreatic duct stent placement;  Surgeon: Zay Gaitan MD;  [facility]     ENT SURGERY      Repair of deviated septum     ESOPHAGOSCOPY, GASTROSCOPY, DUODENOSCOPY (EGD), COMBINED N/A 08/04/2016    Procedure: COMBINED ESOPHAGOSCOPY, GASTROSCOPY, DUODENOSCOPY (EGD), BIOPSY SINGLE OR MULTIPLE;  Surgeon: Trent Pederson MD;  Location:  GI     ESOPHAGOSCOPY, GASTROSCOPY, DUODENOSCOPY (EGD), COMBINED N/A 08/27/2017    Procedure: COMBINED ESOPHAGOSCOPY, GASTROSCOPY, DUODENOSCOPY (EGD);;  Surgeon: Los Wynn MD;  Location:  GI     ESOPHAGOSCOPY, GASTROSCOPY, DUODENOSCOPY (EGD), COMBINED N/A 08/17/2023    Procedure: Esophagoscopy, gastroscopy, duodenoscopy (EGD), combined;  Surgeon: Trent Villanueva MD;  Location:  GI     INCISION AND DRAINAGE ABDOMEN WASHOUT, COMBINED Right 02/14/2018    Procedure: COMBINED INCISION AND DRAINAGE ABDOMEN WASHOUT;  COMBINED INCISION AND DRAINAGE right ABDOMEN flank;  Surgeon: Sara Dinh MD;  Location:  OR     INTRAVITREAL  INJECTION GAS/TPA/METHOTREXATE/ANTIBIOTICS Left 3/14/2025    Procedure: INJECTION, PHARMACOLOGIC AGENT, INTRAVITREAL;  Surgeon: Stan Roque MD;  Location: [facility] OR     LAPAROTOMY EXPLORATORY N/A 2017    Procedure: LAPAROTOMY EXPLORATORY;  Exploratory Laparotomy, washout;  Surgeon: Tip Zhang MD;  Location: [facility] OR     LAPAROTOMY EXPLORATORY N/A 2017    Procedure: LAPAROTOMY EXPLORATORY;  Exploratory Laparotomy, Washout with closure.;  Surgeon: Tip Zhang MD;  Location: [facility] OR     LAPAROTOMY EXPLORATORY N/A 2017    Procedure: LAPAROTOMY EXPLORATORY;  Exploratory Laparotomy, Right angelique-colectomy, end ileostomy, mucosal fistula, partial omentectomy;  Surgeon: Sara Dinh MD;  Location: [facility] OR     LASIK       ORTHOPEDIC SURGERY Right     Repair of dislocated wrist.       PHACOEMULSIFICATION CLEAR CORNEA WITH STANDARD INTRAOCULAR LENS IMPLANT Right 2024    Procedure: RIGHT EYE PHACOEMULSIFICATION, COMPLEX CATARACT, WITH INTRAOCULAR LENS IMPLANT;  Surgeon: Stan Roque MD;  Location: [facility] OR     PHACOEMULSIFICATION WITH STANDARD INTRAOCULAR LENS IMPLANT Left 3/14/2025    Procedure: LEFT EYE PHACOEMULSIFICATION, CATARACT, WITH STANDARD INTRAOCULAR LENS IMPLANT INSERTION;  Surgeon: Stan Roque MD;  Location: [facility] OR     RELEASE TRIGGER FINGER Right 11/10/2017    Procedure: RELEASE TRIGGER FINGER;  Debride, V-Y Flap Right Index Finger;  Surgeon: Momo Noonan MD;  Location: [facility] OR     TAKEDOWN ILEOSTOMY N/A 2018    Procedure: TAKEDOWN ILEOSTOMY;  Exploratory Laparotomy, Lysis of Adhesions, Takedown Of End Ileostomy, Takedown of mucocutaneous fistula, ileocolic resection  And Colorectal Anastomosis, Primary repair of Ventral Hernia, Anesthesia Block ;  Surgeon: Sara Dinh MD;  Location: [facility] OR     TRACHEOSTOMY  2001    During hospitalization for pneumonia.       TRANSPLANT LIVER RECIPIENT   DONOR N/A 2017    Procedure: TRANSPLANT LIVER RECIPIENT  DONOR;  Surgeon: Jovan Tran MD;  Location: [facility] OR       Allergies: Erythromycin, Losartan, and Vioxx    SH:   Social History     Socioeconomic History     Marital status:      Spouse name: Not on file     Number of children: 1     Years of education: Not on file     Highest education level: Not on file   Occupational History     Occupation:     Tobacco Use     Smoking status: Former     Current packs/day: 0.00     Average packs/day: 0.5 packs/day for 1 year (0.5 ttl pk-yrs)     Types: Cigarettes, Cigars     Start date: 1987     Quit date: 1987     Years since quittin.8     Smokeless tobacco: Former     Types: Chew     Quit date: 10/8/2015     Tobacco comments:     1 Cigar once every other month.   Vaping Use     Vaping status: Never Used   Substance and Sexual Activity     Alcohol use: Not Currently     Comment: No alcohol since liver transplant.       Drug use: Never     Sexual activity: Yes     Partners: Female     Birth control/protection: None   Other Topics Concern     Parent/sibling w/ CABG, MI or angioplasty before 65F 55M? No   Social History Narrative    Former , then worked as [employer].         Social Drivers of Health     Financial Resource Strain: Low Risk  (2024)    Financial Resource Strain      Within the past 12 months, have you or your family members you live with been unable to get utilities (heat, electricity) when it was really needed?: No   Food Insecurity: Low Risk  (2024)    Food Insecurity      Within the past 12 months, did you worry that your food would run out before you got money to buy more?: No      Within the past 12 months, did the food you bought just not last and you didn t have money to get more?: No   Transportation Needs: Low Risk  (2024)    Transportation Needs      Within the past 12 months, has lack of transportation kept you from  medical appointments, getting your medicines, non-medical meetings or appointments, work, or from getting things that you need?: No   Physical Activity: Not on file   Stress: Not on file   Social Connections: Not on file   Interpersonal Safety: Low Risk  (3/14/2025)    Interpersonal Safety      Do you feel physically and emotionally safe where you currently live?: Yes      Within the past 12 months, have you been hit, slapped, kicked or otherwise physically hurt by someone?: No      Within the past 12 months, have you been humiliated or emotionally abused in other ways by your partner or ex-partner?: No   Housing Stability: Low Risk  (8/8/2024)    Housing Stability      Do you have housing? : Yes      Are you worried about losing your housing?: No       FH:   Family History   Problem Relation Age of Onset     Arthritis Mother      Thyroid Cancer Mother         Survivor!     Thyroid Disease Mother         Thyroid cancer     Diabetes Father      Substance Abuse Father      Hypertension Father      Cervical Cancer Maternal Grandmother      Skin Cancer Maternal Grandfather      Cerebrovascular Disease Maternal Grandfather      No Known Problems Paternal Grandmother      Prostate Cancer Paternal Grandfather      Colon Cancer No family hx of      Hyperlipidemia No family hx of      Coronary Artery Disease No family hx of      Breast Cancer No family hx of      Glaucoma No family hx of      Macular Degeneration No family hx of        Objective:  Lab Results   Component Value Date    A1C 6.2 07/05/2024    A1C 5.8 03/01/2024    A1C 6.2 11/30/2023    A1C 5.7 07/31/2023    A1C 6.0 09/27/2022    A1C 6.3 03/31/2021    A1C 6.7 02/12/2021    A1C 6.4 11/24/2020    A1C 6.2 10/06/2020    A1C 5.5 04/11/2018         PT and DP pulses are 2/4 bilaterally. CRT is instant. Diminished pedal hair.   Gross sensation is diminished bilaterally. Protective sensation is diminished as demonstrated with a 5.07G monofilament.   Equinus is noted  bilaterally. No pain with active or passive ROM of the ankle, MTJ, 1st ray, or halluces bilaterally,. When standing on the inserts, he is being rolled outward, onto the 5th met heads.   Nails elongated bilaterally.  No open lesions are noted.     Assessment:   Encounter Diagnoses   Name Primary?     Long toenail Yes     Type II or unspecified type diabetes mellitus with neurological manifestations, not stated as uncontrolled(250.60) (H)            Plan:  - Pt seen and evaluated  - Nails trimmed x 10.   - Moisturize daily.  - See again in 3 months.          Again, thank you for allowing me to participate in the care of your patient.        Sincerely,        Leo Joshi DPM    Electronically signed

## 2025-05-07 NOTE — NURSING NOTE
Chief Complaints and History of Present Illnesses   Patient presents with    Post Op (Ophthalmology) Both Eyes     Chief Complaint(s) and History of Present Illness(es)       Post Op (Ophthalmology) Both Eyes              Laterality: both eyes    Onset: 1 month ago              Comments    1 month s/p CE/IOL LE-Pt. States that he is doing well. VA has improved LE. No pain BE. No flashes or floaters BE.   Sonia Aram COT 2:39 PM May 7, 2025

## 2025-05-07 NOTE — PROGRESS NOTES
Chief Complaint:   Chief Complaint   Patient presents with    Follow Up     Diabetic foot care.         HPI: Camacho is a 59 year old male who presents today for a diabetic foot eval and management. He has been diabetic for years. Has liver transplant. Notes that he is having pain in the outside of both feet. Has diabetic shoes and inserts.     Interval hx: he is doing well. He has not trimmed his nails.     Review of Systems: No n/v/d/f/c/ns/sob/cp    PMH:   Past Medical History:   Diagnosis Date    Cancer (H) 12/15    Stage 4 liver cancer    Diabetes type 2, controlled (H) 11/10/2016    Esophageal reflux     Fibromyalgia 01/2009    dx with Dr Benitez( Rheum)    Gangrene of finger (H) 08/25/2017    H/O deep venous thrombosis 11/2001    Permanent IVC filter in place.    H/O CASTAÑEDA (nonalcoholic steatohepatitis)     H/O Pneumonia, organism unspecified(486) 10/2001    Included ARDS, sepsis, and  acute renal failure; hospitalized, required tracheostomy placement.    H/O: HTN (hypertension) 11/2001    No longer prescribed antihypertensive medication.      History of hepatocellular carcinoma     History of liver transplant (H)     History of obstructive sleep apnea     No longer wears CPAP since losing approximately 200 pounds with his liver transplant and its complications.      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    Osteoarthritis     Presence of PERMANENT IVC filter     Rheumatoid arthritis(714.0)     remission for many years    SCC (squamous cell carcinoma) 10/23/2024    Skin infection 10/30/2024    Squamous cell skin cancer     on back       PSxH:   Past Surgical History:   Procedure Laterality Date    ARTHROSCOPY KNEE WITH MENISCAL REPAIR Right 2008    Right knee arthroscopy    BENCH LIVER N/A 03/04/2017    Procedure: BENCH  LIVER;  Surgeon: Jovan Tran MD;  Location: [facility]    BIOPSY  5/2017    Liver    CHOLECYSTECTOMY      COLONOSCOPY N/A 07/21/2017    Procedure: COMBINED COLONOSCOPY, SINGLE OR MULTIPLE BIOPSY/POLYPECTOMY BY BIOPSY;  Colonoscopy;  Surgeon: Izaiah Montes MD;  Location: [facility]    COLONOSCOPY N/A 10/24/2022    Procedure: COLONOSCOPY, WITH POLYPECTOMY AND BIOPSY;  Surgeon: Abril Mcneill MD;  Location: [facility]    ENDOSCOPIC RETROGRADE CHOLANGIOPANCREATOGRAM N/A 05/22/2018    Procedure: COMBINED ENDOSCOPIC RETROGRADE CHOLANGIOPANCREATOGRAPHY, PLACE TUBE/STENT;  Endoscopic Retrograde Cholangiopancreatography with sphincterotomy and pancreatic duct stent placement;  Surgeon: Zay Gaitan MD;  Location: [facility]    ENT SURGERY      Repair of deviated septum    ESOPHAGOSCOPY, GASTROSCOPY, DUODENOSCOPY (EGD), COMBINED N/A 08/04/2016    Procedure: COMBINED ESOPHAGOSCOPY, GASTROSCOPY, DUODENOSCOPY (EGD), BIOPSY SINGLE OR MULTIPLE;  Surgeon: Trent Pederson MD;  Location:  GI    ESOPHAGOSCOPY, GASTROSCOPY, DUODENOSCOPY (EGD), COMBINED N/A 08/27/2017    Procedure: COMBINED ESOPHAGOSCOPY, GASTROSCOPY, DUODENOSCOPY (EGD);;  Surgeon: Los Wynn MD;  [facility]    ESOPHAGOSCOPY, GASTROSCOPY, DUODENOSCOPY (EGD), COMBINED N/A 08/17/2023    Procedure: Esophagoscopy, gastroscopy, duodenoscopy (EGD), combined;  Surgeon: Trent Villanueva MD;  Location: [facility]    INCISION AND DRAINAGE ABDOMEN WASHOUT, COMBINED Right 02/14/2018    Procedure: COMBINED INCISION AND DRAINAGE ABDOMEN WASHOUT;  COMBINED INCISION AND DRAINAGE right ABDOMEN flank;  Surgeon: Sara Dinh MD;  [facility]: [facility]    INTRAVITREAL INJECTION GAS/TPA/METHOTREXATE/ANTIBIOTICS Left 3/14/2025    Procedure: INJECTION, PHARMACOLOGIC AGENT, INTRAVITREAL;  Surgeon: Stan Roque MD;  Location: [facility]    LAPAROTOMY EXPLORATORY N/A 07/04/2017    Procedure: LAPAROTOMY EXPLORATORY;  Exploratory Laparotomy, washout;   Surgeon: Tip Zhang MD;  [facility]: [facility]    LAPAROTOMY EXPLORATORY N/A 2017    Procedure: LAPAROTOMY EXPLORATORY;  Exploratory Laparotomy, Washout with closure.;  Surgeon: Tip Zhang MD;  [facility]: [facility]    LAPAROTOMY EXPLORATORY N/A 2017    Procedure: LAPAROTOMY EXPLORATORY;  Exploratory Laparotomy, Right angelique-colectomy, end ileostomy, mucosal fistula, partial omentectomy;  Surgeon: Sara Dinh MD;  Location: [facility]    LASIK      ORTHOPEDIC SURGERY Right     Repair of dislocated wrist.      PHACOEMULSIFICATION CLEAR CORNEA WITH STANDARD INTRAOCULAR LENS IMPLANT Right 2024    Procedure: RIGHT EYE PHACOEMULSIFICATION, COMPLEX CATARACT, WITH INTRAOCULAR LENS IMPLANT;  Surgeon: Stan Roque MD;  Location: [facility]    PHACOEMULSIFICATION WITH STANDARD INTRAOCULAR LENS IMPLANT Left 3/14/2025    Procedure: LEFT EYE PHACOEMULSIFICATION, CATARACT, WITH STANDARD INTRAOCULAR LENS IMPLANT INSERTION;  Surgeon: Stan Roque MD;  Location: [facility]    RELEASE TRIGGER FINGER Right 11/10/2017    Procedure: RELEASE TRIGGER FINGER;  Debride, V-Y Flap Right Index Finger;  Surgeon: Momo Noonan MD;  [facility]    TAKEDOWN ILEOSTOMY N/A 2018    Procedure: TAKEDOWN ILEOSTOMY;  Exploratory Laparotomy, Lysis of Adhesions, Takedown Of End Ileostomy, Takedown of mucocutaneous fistula, ileocolic resection  And Colorectal Anastomosis, Primary repair of Ventral Hernia, Anesthesia Block ;  Surgeon: Sara Dinh MD;  Location: [facility]    TRACHEOSTOMY  2001    During hospitalization for pneumonia.      TRANSPLANT LIVER RECIPIENT  DONOR N/A 2017    Procedure: TRANSPLANT LIVER RECIPIENT  DONOR;  Surgeon: Jovan Tran MD;  Location: [facility]       Allergies: Erythromycin, Losartan, and Vioxx    SH:   Social History     Socioeconomic History    Marital status:      Spouse name: Not on file    Number of  children: 1    Years of education: Not on file    Highest education level: Not on file   Occupational History    Occupation:     Tobacco Use    Smoking status: Former     Current packs/day: 0.00     Average packs/day: 0.5 packs/day for 1 year (0.5 ttl pk-yrs)     Types: Cigarettes, Cigars     Start date: 1987     Quit date: 1987     Years since quittin.8    Smokeless tobacco: Former     Types: Chew     Quit date: 10/8/2015    Tobacco comments:     1 Cigar once every other month.   Vaping Use    Vaping status: Never Used   Substance and Sexual Activity    Alcohol use: Not Currently     Comment: No alcohol since liver transplant.      Drug use: Never    Sexual activity: Yes     Partners: Female     Birth control/protection: None   Other Topics Concern    Parent/sibling w/ CABG, MI or angioplasty before 65F 55M? No   Social History Narrative    Former , then worked as [employer].         Social Drivers of Health     Financial Resource Strain: Low Risk  (2024)    Financial Resource Strain     Within the past 12 months, have you or your family members you live with been unable to get utilities (heat, electricity) when it was really needed?: No   Food Insecurity: Low Risk  (2024)    Food Insecurity     Within the past 12 months, did you worry that your food would run out before you got money to buy more?: No     Within the past 12 months, did the food you bought just not last and you didn t have money to get more?: No   Transportation Needs: Low Risk  (2024)    Transportation Needs     Within the past 12 months, has lack of transportation kept you from medical appointments, getting your medicines, non-medical meetings or appointments, work, or from getting things that you need?: No   Physical Activity: Not on file   Stress: Not on file   Social Connections: Not on file   Interpersonal Safety: Low Risk  (3/14/2025)    Interpersonal Safety     Do you feel physically  and emotionally safe where you currently live?: Yes     Within the past 12 months, have you been hit, slapped, kicked or otherwise physically hurt by someone?: No     Within the past 12 months, have you been humiliated or emotionally abused in other ways by your partner or ex-partner?: No   Housing Stability: Low Risk  (8/8/2024)    Housing Stability     Do you have housing? : Yes     Are you worried about losing your housing?: No       FH:   Family History   Problem Relation Age of Onset    Arthritis Mother     Thyroid Cancer Mother         Survivor!    Thyroid Disease Mother         Thyroid cancer    Diabetes Father     Substance Abuse Father     Hypertension Father     Cervical Cancer Maternal Grandmother     Skin Cancer Maternal Grandfather     Cerebrovascular Disease Maternal Grandfather     No Known Problems Paternal Grandmother     Prostate Cancer Paternal Grandfather     Colon Cancer No family hx of     Hyperlipidemia No family hx of     Coronary Artery Disease No family hx of     Breast Cancer No family hx of     Glaucoma No family hx of     Macular Degeneration No family hx of        Objective:  Lab Results   Component Value Date    A1C 6.2 07/05/2024    A1C 5.8 03/01/2024    A1C 6.2 11/30/2023    A1C 5.7 07/31/2023    A1C 6.0 09/27/2022    A1C 6.3 03/31/2021    A1C 6.7 02/12/2021    A1C 6.4 11/24/2020    A1C 6.2 10/06/2020    A1C 5.5 04/11/2018         PT and DP pulses are 2/4 bilaterally. CRT is instant. Diminished pedal hair.   Gross sensation is diminished bilaterally. Protective sensation is diminished as demonstrated with a 5.07G monofilament.   Equinus is noted bilaterally. No pain with active or passive ROM of the ankle, MTJ, 1st ray, or halluces bilaterally,. When standing on the inserts, he is being rolled outward, onto the 5th met heads.   Nails elongated bilaterally.  No open lesions are noted.     Assessment:   Encounter Diagnoses   Name Primary?    Long toenail Yes    Type II or unspecified  type diabetes mellitus with neurological manifestations, not stated as uncontrolled(250.60) (H)            Plan:  - Pt seen and evaluated  - Nails trimmed x 10.   - Moisturize daily.  - See again in 3 months.

## 2025-05-07 NOTE — PROGRESS NOTES
HPI       Post Op (Ophthalmology) Both Eyes    In both eyes.  This started 1 month ago.             Comments    1 month s/p CE/IOL LE-Pt. States that he is doing well. VA has improved LE. No pain BE. No flashes or floaters BE.   Sonia Chiu COT 2:39 PM May 7, 2025             Last edited by Sonia Chiu on 5/7/2025  2:40 PM.           Review of systems for the eyes was negative other than the pertinent positives/negatives listed in the HPI.      Assessment & Plan    HPI:    Camacho Bhagat is a 59 year old male with history of T2DM, HTN, HLD, ED, liver transplant, laser in situ keratomileusis (LASIK) presents for Postoperative month 2 left eye     POHx: lasik  PMHx: 2DM, HTN, HLD, ED, liver transplant  Current Medications:   Current Outpatient Medications   Medication Sig Dispense Refill    diclofenac (VOLTAREN) 0.1 % ophthalmic solution Place 1 drop Into the left eye 4 times daily. 5 mL 4    alcohol swab prep pads Use to swab area of injection/francisca as directed. 100 each 3    alpha-lipoic acid 600 MG capsule Take 600 mg by mouth      amLODIPine (NORVASC) 10 MG tablet Take 1 tablet (10 mg) by mouth daily. 90 tablet 3    augmented betamethasone dipropionate (DIPROLENE-AF) 0.05 % external ointment Apply twice daily as needed for rash on the legs 45 g 11    cholecalciferol (VITAMIN D3) 1000 UNIT tablet Take 1 tablet (1,000 Units) by mouth daily 100 tablet 3    Continuous Glucose  (DEXCOM G7 ) TACOS Use to read blood sugars as per 's instructions. 1 each 0    Continuous Glucose Sensor (DEXCOM G7 SENSOR) MISC Change every 10 days. 9 each 3    cyclobenzaprine (FLEXERIL) 10 MG tablet Take 1 tablet (10 mg) by mouth 3 times daily as needed for muscle spasms 90 tablet 3    doxazosin (CARDURA) 8 MG tablet Take 1 tablet (8 mg) by mouth at bedtime. Take a total of 10 mg at bedtime 90 tablet 3    empagliflozin (JARDIANCE) 10 MG TABS tablet Take 1 tablet (10 mg) by mouth daily. 90 tablet 1    fluticasone  (FLONASE) 50 MCG/ACT nasal spray Spray 1 spray into both nostrils daily 20 mL 3    furosemide (LASIX) 40 MG tablet Take 40 mg daily and OK to take an extra for LE edema. 180 tablet 3    gabapentin (NEURONTIN) 800 MG tablet Take 1 tablet (800 mg) by mouth 3 times daily. 90 tablet 1    hydrALAZINE (APRESOLINE) 50 MG tablet Take 1 tablet (50 mg) by mouth 2 times daily. 180 tablet 3    hydrOXYzine (ATARAX) 25 MG tablet Take 1 tablet (25 mg) by mouth 3 times daily as needed for itching 90 tablet 3    Insulin Disposable Pump (OMNIPOD 5 G6 POD, GEN 5,) MISC 1 each See Admin Instructions Use per 's instructions. 1 each 1    Insulin Disposable Pump (OMNIPOD 5 PODS, GEN 5,) MISC 1 each See Admin Instructions. Change every 2 days. Use for insulin administration. 45 each 3    Insulin Lispro-aabc (LYUMJEV KWIKPEN) 200 UNIT/ML SOPN 1 Units by Other route See Admin Instructions. Via insulin pump. Approx 170 units daily plus 2-3 units daily to prime pump and tubing. (Patient taking differently: 1 Units by Other route once a week. Via insulin pump. Approx 170 units daily plus 2-3 units daily to prime pump and tubing.) 170 mL 3    insulin pen needle (BD ENE U/F) 32G X 4 MM miscellaneous Use 1 pen needle 4 times daily or as directed. 400 each 1    ketorolac (ACULAR) 0.5 % ophthalmic solution Place 1 drop Into the left eye 4 times daily. Start 2 days prior to surgery four times a day, after surgery: Four times a day x 1 week, three times a day x 1 week, twice a day x 1 week, daily x 1 week then stop 10 mL 0    lidocaine (LIDODERM) 5 % patch Place 1 patch onto the skin every 24 hours To prevent lidocaine toxicity, patient should be patch free for 12 hrs daily. 30 patch 11    lidocaine (XYLOCAINE) 5 % external ointment Apply topically as needed for moderate pain 90 g 11    losartan (COZAAR) 25 MG tablet Take 1 tablet (25 mg) by mouth daily. 90 tablet 3    methocarbamol (ROBAXIN) 500 MG tablet Take 1 tablet (500 mg) by mouth  2 times daily as needed for muscle spasms. 60 tablet 11    metoprolol succinate ER (TOPROL XL) 200 MG 24 hr tablet Take 1 tablet (200 mg) by mouth daily. 90 tablet 1    moxifloxacin (VIGAMOX) 0.5 % ophthalmic solution Place 1 drop Into the left eye 4 times daily. Start 2 days prior to surgery four times a day. After Surgery: four times a day  X 1 week 3 mL 0    mycophenolic acid (GENERIC EQUIVALENT) 360 MG EC tablet Take 2 tablets (720 mg) by mouth every 12 hours. 360 tablet 3    oxyCODONE (ROXICODONE) 5 MG tablet Take 1 tablet (5 mg) by mouth 4 times daily as needed for pain. maximum 6 tablet(s) per day 30 tablet 0    prednisoLONE acetate (PRED FORTE) 1 % ophthalmic suspension Place 1-2 drops Into the left eye 4 times daily. After surgery: Four times a day x 1 week, three times a day x 1 week, twice a day x 1 week, daily x 1 week then stop 10 mL 0    predniSONE (DELTASONE) 2.5 MG tablet Take 1 tablet (2.5 mg) by mouth daily. 90 tablet 3    PROGRAF (BRAND) 1 MG capsule       tacrolimus (GENERIC EQUIVALENT) 1 MG capsule Take 4 capsules (4 mg) by mouth every morning AND 3 capsules (3 mg) every evening. 630 capsule 3    Tacrolimus 1 MG PACK       tirzepatide (MOUNJARO) 10 MG/0.5ML SOAJ auto-injector pen Inject 0.5 mLs (10 mg) subcutaneously once a week. 2 mL 2    tretinoin (RETIN-A) 0.025 % external cream Apply a pea-sized amount evenly over the face at nighttime before bed 45 g 11    triamcinolone (KENALOG) 0.1 % external ointment Apply topically 2 times daily to the itching on the legs 80 g 1    ursodiol (ACTIGALL) 500 MG tablet Take 1 tablet (500 mg) by mouth 2 times daily. 180 tablet 3     No current facility-administered medications for this visit.     PSHx: Cataract extraction/intraocular lens right eye 2/1/24,  left eye  03/14/25    Current Eye Medications:    Assessment & Plan:  (Z96.1) Pseudophakia, right eye  (primary encounter diagnosis)  Cataract extraction/intraocular lens right eye Jude  2/1/24    Pseudophakia, left eye  Roque left eye  03/14/25   Doing great    (E11.3213,  Z79.4) Type 2 diabetes mellitus with both eyes affected by mild nonproliferative retinopathy and macular edema, with long-term current use of insulin (H)  (H35.352) Cystoid macular edema, left  (primary encounter diagnosis)  Diagnosed 2003  Most recent HgBA1c 6.2 on 7/5/24  Mild background diabetic retinopathy without neovascularization noted on today's exam.      Retina saw patient on 11/11/2024, no recommendation for Avastin at that time, edema though to be 2/2 diabetes rather than Benton Marga given bilaterality. Left eye vision changes likely from cataract.     Today with edema left eye, stable right eye  Will have patient start diclofenac four times a day left eye and move retina appointment up to consider atni-VEGF      (Z98.890) H/O laser assisted in situ keratomileusis      (H52.03,  H52.203,  H52.4) Hyperopia of both eyes with astigmatism and presbyopia  Patient has minimal change in hyperopia but a copy of today's glasses prescription was given.  The patient may wish to update the glasses if the lenses are scratched or the frames are too small.  Presbyopia is difficulty seeing up close and is treated with bifocals or over the counter reading glasses        (H35.033) Hypertensive retinopathy of both eyes  Evaluated by Dr. Saulo Mcneil on 11/11/2024; patient on amlodipine      (H04.129) Dry eye    Return for saulo mcneil move up, consdier anti-VEGF, v/t/d/oct mac.        Stan Roque MD     Attending Physician Attestation:  Complete documentation of historical and exam elements from today's encounter can be found in the full encounter summary report (not reduplicated in this progress note).  I personally obtained the chief complaint(s) and history of present illness.  I confirmed and edited as necessary the review of systems, past medical/surgical history, family history, social history, and examination findings as  documented by others; and I examined the patient myself.  I personally reviewed the relevant tests, images, and reports as documented above.  I formulated and edited as necessary the assessment and plan and discussed the findings and management plan with the patient and family. - Stan Roque MD

## 2025-05-08 ENCOUNTER — TELEPHONE (OUTPATIENT)
Dept: TRANSPLANT | Facility: CLINIC | Age: 60
End: 2025-05-08
Payer: COMMERCIAL

## 2025-05-08 NOTE — TELEPHONE ENCOUNTER
Received a call from Camacho, he has been prescribed an NSAID eyedrop, diclofenac. Will see his ophthalmologist June 9th.     Discussed with transplant pharmacist Ryder Yepez, ok to use.    Patient verbalizes understanding, no further questions or concerns at this time.

## 2025-05-12 ENCOUNTER — OFFICE VISIT (OUTPATIENT)
Dept: GASTROENTEROLOGY | Facility: CLINIC | Age: 60
End: 2025-05-12
Attending: INTERNAL MEDICINE
Payer: COMMERCIAL

## 2025-05-12 VITALS
HEIGHT: 72 IN | WEIGHT: 259 LBS | OXYGEN SATURATION: 97 % | DIASTOLIC BLOOD PRESSURE: 73 MMHG | HEART RATE: 74 BPM | SYSTOLIC BLOOD PRESSURE: 149 MMHG | BODY MASS INDEX: 35.08 KG/M2

## 2025-05-12 DIAGNOSIS — Z94.4 LIVER REPLACED BY TRANSPLANT (H): Primary | ICD-10-CM

## 2025-05-12 DIAGNOSIS — I15.1 HYPERTENSION SECONDARY TO OTHER RENAL DISORDERS: ICD-10-CM

## 2025-05-12 DIAGNOSIS — N18.30 STAGE 3 CHRONIC KIDNEY DISEASE, UNSPECIFIED WHETHER STAGE 3A OR 3B CKD (H): ICD-10-CM

## 2025-05-12 PROCEDURE — 3078F DIAST BP <80 MM HG: CPT | Performed by: INTERNAL MEDICINE

## 2025-05-12 PROCEDURE — 3077F SYST BP >= 140 MM HG: CPT | Performed by: INTERNAL MEDICINE

## 2025-05-12 PROCEDURE — 99213 OFFICE O/P EST LOW 20 MIN: CPT | Performed by: INTERNAL MEDICINE

## 2025-05-12 PROCEDURE — 99215 OFFICE O/P EST HI 40 MIN: CPT | Mod: 24 | Performed by: INTERNAL MEDICINE

## 2025-05-12 PROCEDURE — 1125F AMNT PAIN NOTED PAIN PRSNT: CPT | Performed by: INTERNAL MEDICINE

## 2025-05-12 ASSESSMENT — PAIN SCALES - GENERAL: PAINLEVEL_OUTOF10: SEVERE PAIN (8)

## 2025-05-12 NOTE — PROGRESS NOTES
Solid Organ Transplant Hepatology Follow-Up Visit:     HISTORY OF PRESENT ILLNESS:   I had the pleasure of seeing Camacho Bhagat for followup in the Liver Clinic at the [facility] on May 12, 2025. Mr. Bhagat returns now 8 years status post liver transplantation.     He has had a number of problems here recently.  First he had bilateral cataract surgery.  He also has some sort of macular issue as well and will be seeing a retinal specialist for that in the near future.  He also has back pain and had an MRI which showed multiple cysts that seem to have disappeared and he is following up with neurosurgery for that.    Otherwise, he is doing well.  He denies abdominal pain, itching, skin rash, but does complain of fatigue.  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  He denies increased abdominal girth or lower extremity edema.    He denies fevers or chills, cough or shortness of breath.  He denies any nausea, vomiting.  He does have chronic diarrhea.  He did have a partial colectomy around the time of transplant with resection of the ileocecal valve and I suspect this is some of the reason why he has these symptoms.       Otherwise, his appetite is good and his weight is decreasing on the GLP 1 receptor agonist.  He also reports his diabetes is now under much better control.    He has had a great deal of difficulty with hypertension management and is currently on 4 or 5 drugs.     He did have a squamous cell cancer of the skin removed from his back.     Medications:   Current Outpatient Medications   Medication Sig Dispense Refill    alcohol swab prep pads Use to swab area of injection/francisca as directed. 100 each 3    alpha-lipoic acid 600 MG capsule Take 600 mg by mouth      amLODIPine (NORVASC) 10 MG tablet Take 1 tablet (10 mg) by mouth daily. 90 tablet 3    augmented betamethasone dipropionate (DIPROLENE-AF) 0.05 % external ointment Apply twice daily as needed for rash on the  legs 45 g 11    cholecalciferol (VITAMIN D3) 1000 UNIT tablet Take 1 tablet (1,000 Units) by mouth daily 100 tablet 3    Continuous Glucose  (DEXCOM G7 ) TACOS Use to read blood sugars as per 's instructions. 1 each 0    Continuous Glucose Sensor (DEXCOM G7 SENSOR) MISC Change every 10 days. 9 each 3    cyclobenzaprine (FLEXERIL) 10 MG tablet Take 1 tablet (10 mg) by mouth 3 times daily as needed for muscle spasms 90 tablet 3    diclofenac (VOLTAREN) 0.1 % ophthalmic solution Place 1 drop Into the left eye 4 times daily. 5 mL 4    doxazosin (CARDURA) 8 MG tablet Take 1 tablet (8 mg) by mouth at bedtime. Take a total of 10 mg at bedtime 90 tablet 3    empagliflozin (JARDIANCE) 10 MG TABS tablet Take 1 tablet (10 mg) by mouth daily. 90 tablet 1    fluticasone (FLONASE) 50 MCG/ACT nasal spray Spray 1 spray into both nostrils daily 20 mL 3    furosemide (LASIX) 40 MG tablet Take 40 mg daily and OK to take an extra for LE edema. 180 tablet 3    gabapentin (NEURONTIN) 800 MG tablet Take 1 tablet (800 mg) by mouth 3 times daily. 90 tablet 1    hydrALAZINE (APRESOLINE) 50 MG tablet Take 1 tablet (50 mg) by mouth 2 times daily. 180 tablet 3    hydrOXYzine (ATARAX) 25 MG tablet Take 1 tablet (25 mg) by mouth 3 times daily as needed for itching 90 tablet 3    Insulin Disposable Pump (OMNIPOD 5 G6 POD, GEN 5,) MISC 1 each See Admin Instructions Use per 's instructions. 1 each 1    Insulin Disposable Pump (OMNIPOD 5 PODS, GEN 5,) MISC 1 each See Admin Instructions. Change every 2 days. Use for insulin administration. 45 each 3    Insulin Lispro-aabc (LYUMJEV KASSIDYPEN) 200 UNIT/ML SOPN 1 Units by Other route See Admin Instructions. Via insulin pump. Approx 170 units daily plus 2-3 units daily to prime pump and tubing. (Patient taking differently: 1 Units by Other route once a week. Via insulin pump. Approx 170 units daily plus 2-3 units daily to prime pump and tubing.) 170 mL 3    insulin pen  needle (BD ENE U/F) 32G X 4 MM miscellaneous Use 1 pen needle 4 times daily or as directed. 400 each 1    ketorolac (ACULAR) 0.5 % ophthalmic solution Place 1 drop Into the left eye 4 times daily. Start 2 days prior to surgery four times a day, after surgery: Four times a day x 1 week, three times a day x 1 week, twice a day x 1 week, daily x 1 week then stop 10 mL 0    lidocaine (LIDODERM) 5 % patch Place 1 patch onto the skin every 24 hours To prevent lidocaine toxicity, patient should be patch free for 12 hrs daily. 30 patch 11    lidocaine (XYLOCAINE) 5 % external ointment Apply topically as needed for moderate pain 90 g 11    losartan (COZAAR) 25 MG tablet Take 1 tablet (25 mg) by mouth daily. 90 tablet 3    methocarbamol (ROBAXIN) 500 MG tablet Take 1 tablet (500 mg) by mouth 2 times daily as needed for muscle spasms. 60 tablet 11    metoprolol succinate ER (TOPROL XL) 200 MG 24 hr tablet Take 1 tablet (200 mg) by mouth daily. 90 tablet 1    moxifloxacin (VIGAMOX) 0.5 % ophthalmic solution Place 1 drop Into the left eye 4 times daily. Start 2 days prior to surgery four times a day. After Surgery: four times a day  X 1 week 3 mL 0    mycophenolic acid (GENERIC EQUIVALENT) 360 MG EC tablet Take 2 tablets (720 mg) by mouth every 12 hours. 360 tablet 3    oxyCODONE (ROXICODONE) 5 MG tablet Take 1 tablet (5 mg) by mouth 4 times daily as needed for pain. maximum 6 tablet(s) per day 30 tablet 0    prednisoLONE acetate (PRED FORTE) 1 % ophthalmic suspension Place 1-2 drops Into the left eye 4 times daily. After surgery: Four times a day x 1 week, three times a day x 1 week, twice a day x 1 week, daily x 1 week then stop 10 mL 0    predniSONE (DELTASONE) 2.5 MG tablet Take 1 tablet (2.5 mg) by mouth daily. 90 tablet 3    PROGRAF (BRAND) 1 MG capsule       tacrolimus (GENERIC EQUIVALENT) 1 MG capsule Take 4 capsules (4 mg) by mouth every morning AND 3 capsules (3 mg) every evening. 630 capsule 3    Tacrolimus 1 MG PACK  "      tirzepatide (MOUNJARO) 10 MG/0.5ML SOAJ auto-injector pen Inject 0.5 mLs (10 mg) subcutaneously once a week. 2 mL 2    tretinoin (RETIN-A) 0.025 % external cream Apply a pea-sized amount evenly over the face at nighttime before bed 45 g 11    triamcinolone (KENALOG) 0.1 % external ointment Apply topically 2 times daily to the itching on the legs 80 g 1    ursodiol (ACTIGALL) 500 MG tablet Take 1 tablet (500 mg) by mouth 2 times daily. 180 tablet 3     No current facility-administered medications for this visit.      Vitals:   BP (!) 149/73 (BP Location: Right arm, Patient Position: Sitting)   Pulse 74   Ht 1.829 m (6' 0.01\")   Wt 117.5 kg (259 lb)   SpO2 97%   BMI 35.12 kg/m      Physical Exam:   In general he looks quite well. HEENT exam shows no scleral icterus or temporal muscle wasting. Chest is clear. Abdominal exam shows no increase in girth. No masses or tenderness to palpation are present. Liver is 10 cm in span without left lobe enlargement. No spleen tip is palpable.  His incision is intact. Extremity exam shows no edema. Skin exam shows no suspicious lesions and neurologic exam is nonfocal.     Labs:   Lab Results   Component Value Date     02/28/2025    POTASSIUM 4.6 02/28/2025    CHLORIDE 109 (H) 02/28/2025    ANIONGAP 12 02/28/2025    CO2 21 (L) 02/28/2025    BUN 37.8 (H) 02/28/2025    CR 1.89 (H) 02/28/2025    GFRESTIMATED 40 (L) 02/28/2025    JACOB 8.7 (L) 02/28/2025      Lab Results   Component Value Date    WBC 4.0 02/28/2025    HGB 9.7 (L) 02/28/2025    HCT 30.6 (L) 02/28/2025    MCV 93 02/28/2025    MCH 29.6 02/28/2025    MCHC 31.7 02/28/2025    RDW 13.3 02/28/2025     (L) 02/28/2025     Lab Results   Component Value Date    ALBUMIN 4.2 02/28/2025    ALKPHOS 159 (H) 02/28/2025    AST 29 02/28/2025    BILICONJ 0.0 06/28/2012     Lab Results   Component Value Date    INR 0.89 06/01/2018     Recent Labs   Lab Test 02/28/25  1650 11/01/24  0723 09/16/24  1657 07/08/24  1647 " 05/23/24  0721 03/01/24  1712 01/09/24  1714 11/30/23  1648 09/25/23  1029 07/31/23  1704   ALKPHOS 159* 176* 177* 155* 155* 144 162* 164* 153* 173*   ALT 30 16 18 20 22 23 21 23 24 27   AST 29 22 20 27 21 24 26 25 28 32      Imaging:   No images are attached to the encounter.     Assessment/Plan:   IMPRESSION:   My impression is Mr. Bhagat is doing well now more than 8 years status post liver transplantation.  He is due to have follow-up blood testing.  His liver tests have been quite good. He does have chronic kidney disease as well, and similarly, that remains stable with a GFR of 40.  I am concerned about his blood pressure.  He is requiring fairly significant doses of blood pressure medications but it sounds as though his blood pressure has been under better control as of late     He also has diabetes and his A1c is excellent on a GLP 1 receptor agonist.  He is up to date with regard to vaccines.  For other cancer screening, he did see the dermatologist and had his first squamous cell cancer.  He is up to date with regard to screening colonoscopy as well.  His last bone density study was essentially normal.        My plan will be to see him back in the clinic again in 6 months.     I did spend a total of 40 minutes (on the date of the encounter), including 30 minutes of face-to-face clinic time including counseling. The rest of the time was spent in documentation and review of records.    Thank you very much for allowing me to participate in the care of this patient.  If you have any questions regarding my recommendations, please do not hesitate to contact me.         Toñito Camejo MD      Professor of Medicine  [employer]      Executive Medical Director, Solid Organ Transplant Program  [facility]

## 2025-05-12 NOTE — LETTER
5/12/2025      Camacho Bhagat  [address] [facility] [id number]      Dear Colleague,    Thank you for referring your patient, Camacho Bhagat, to the [facility] [place]. Please see a copy of my visit note below.    Solid Organ Transplant Hepatology Follow-Up Visit:     HISTORY OF PRESENT ILLNESS:   I had the pleasure of seeing Camacho Bhagat for followup in the Liver Clinic at the [facility] on May 12, 2025. Mr. Bhagat returns now 8 years status post liver transplantation.     He has had a number of problems here recently.  First he had bilateral cataract surgery.  He also has some sort of macular issue as well and will be seeing a retinal specialist for that in the near future.  He also has back pain and had an MRI which showed multiple cysts that seem to have disappeared and he is following up with neurosurgery for that.    Otherwise, he is doing well.  He denies abdominal pain, itching, skin rash, but does complain of fatigue.  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  He denies increased abdominal girth or lower extremity edema.    He denies fevers or chills, cough or shortness of breath.  He denies any nausea, vomiting.  He does have chronic diarrhea.  He did have a partial colectomy around the time of transplant with resection of the ileocecal valve and I suspect this is some of the reason why he has these symptoms.       Otherwise, his appetite is good and his weight is decreasing on the GLP 1 receptor agonist.  He also reports his diabetes is now under much better control.    He has had a great deal of difficulty with hypertension management and is currently on 4 or 5 drugs.     He did have a squamous cell cancer of the skin removed from his back.     Medications:   Current Outpatient Medications   Medication Sig Dispense Refill     alcohol swab prep pads Use to swab area of injection/francisca as directed. 100 each 3     alpha-lipoic  acid 600 MG capsule Take 600 mg by mouth       amLODIPine (NORVASC) 10 MG tablet Take 1 tablet (10 mg) by mouth daily. 90 tablet 3     augmented betamethasone dipropionate (DIPROLENE-AF) 0.05 % external ointment Apply twice daily as needed for rash on the legs 45 g 11     cholecalciferol (VITAMIN D3) 1000 UNIT tablet Take 1 tablet (1,000 Units) by mouth daily 100 tablet 3     Continuous Glucose  (DEXCOM G7 ) TACOS Use to read blood sugars as per 's instructions. 1 each 0     Continuous Glucose Sensor (DEXCOM G7 SENSOR) MISC Change every 10 days. 9 each 3     cyclobenzaprine (FLEXERIL) 10 MG tablet Take 1 tablet (10 mg) by mouth 3 times daily as needed for muscle spasms 90 tablet 3     diclofenac (VOLTAREN) 0.1 % ophthalmic solution Place 1 drop Into the left eye 4 times daily. 5 mL 4     doxazosin (CARDURA) 8 MG tablet Take 1 tablet (8 mg) by mouth at bedtime. Take a total of 10 mg at bedtime 90 tablet 3     empagliflozin (JARDIANCE) 10 MG TABS tablet Take 1 tablet (10 mg) by mouth daily. 90 tablet 1     fluticasone (FLONASE) 50 MCG/ACT nasal spray Spray 1 spray into both nostrils daily 20 mL 3     furosemide (LASIX) 40 MG tablet Take 40 mg daily and OK to take an extra for LE edema. 180 tablet 3     gabapentin (NEURONTIN) 800 MG tablet Take 1 tablet (800 mg) by mouth 3 times daily. 90 tablet 1     hydrALAZINE (APRESOLINE) 50 MG tablet Take 1 tablet (50 mg) by mouth 2 times daily. 180 tablet 3     hydrOXYzine (ATARAX) 25 MG tablet Take 1 tablet (25 mg) by mouth 3 times daily as needed for itching 90 tablet 3     Insulin Disposable Pump (OMNIPOD 5 G6 POD, GEN 5,) MISC 1 each See Admin Instructions Use per 's instructions. 1 each 1     Insulin Disposable Pump (OMNIPOD 5 PODS, GEN 5,) MISC 1 each See Admin Instructions. Change every 2 days. Use for insulin administration. 45 each 3     Insulin Lispro-aabc (LYUMHOLDEN ENGLE) 200 UNIT/ML SOPN 1 Units by Other route See Admin  Instructions. Via insulin pump. Approx 170 units daily plus 2-3 units daily to prime pump and tubing. (Patient taking differently: 1 Units by Other route once a week. Via insulin pump. Approx 170 units daily plus 2-3 units daily to prime pump and tubing.) 170 mL 3     insulin pen needle (BD ENE U/F) 32G X 4 MM miscellaneous Use 1 pen needle 4 times daily or as directed. 400 each 1     ketorolac (ACULAR) 0.5 % ophthalmic solution Place 1 drop Into the left eye 4 times daily. Start 2 days prior to surgery four times a day, after surgery: Four times a day x 1 week, three times a day x 1 week, twice a day x 1 week, daily x 1 week then stop 10 mL 0     lidocaine (LIDODERM) 5 % patch Place 1 patch onto the skin every 24 hours To prevent lidocaine toxicity, patient should be patch free for 12 hrs daily. 30 patch 11     lidocaine (XYLOCAINE) 5 % external ointment Apply topically as needed for moderate pain 90 g 11     losartan (COZAAR) 25 MG tablet Take 1 tablet (25 mg) by mouth daily. 90 tablet 3     methocarbamol (ROBAXIN) 500 MG tablet Take 1 tablet (500 mg) by mouth 2 times daily as needed for muscle spasms. 60 tablet 11     metoprolol succinate ER (TOPROL XL) 200 MG 24 hr tablet Take 1 tablet (200 mg) by mouth daily. 90 tablet 1     moxifloxacin (VIGAMOX) 0.5 % ophthalmic solution Place 1 drop Into the left eye 4 times daily. Start 2 days prior to surgery four times a day. After Surgery: four times a day  X 1 week 3 mL 0     mycophenolic acid (GENERIC EQUIVALENT) 360 MG EC tablet Take 2 tablets (720 mg) by mouth every 12 hours. 360 tablet 3     oxyCODONE (ROXICODONE) 5 MG tablet Take 1 tablet (5 mg) by mouth 4 times daily as needed for pain. maximum 6 tablet(s) per day 30 tablet 0     prednisoLONE acetate (PRED FORTE) 1 % ophthalmic suspension Place 1-2 drops Into the left eye 4 times daily. After surgery: Four times a day x 1 week, three times a day x 1 week, twice a day x 1 week, daily x 1 week then stop 10 mL 0      "predniSONE (DELTASONE) 2.5 MG tablet Take 1 tablet (2.5 mg) by mouth daily. 90 tablet 3     PROGRAF (BRAND) 1 MG capsule        tacrolimus (GENERIC EQUIVALENT) 1 MG capsule Take 4 capsules (4 mg) by mouth every morning AND 3 capsules (3 mg) every evening. 630 capsule 3     Tacrolimus 1 MG PACK        tirzepatide (MOUNJARO) 10 MG/0.5ML SOAJ auto-injector pen Inject 0.5 mLs (10 mg) subcutaneously once a week. 2 mL 2     tretinoin (RETIN-A) 0.025 % external cream Apply a pea-sized amount evenly over the face at nighttime before bed 45 g 11     triamcinolone (KENALOG) 0.1 % external ointment Apply topically 2 times daily to the itching on the legs 80 g 1     ursodiol (ACTIGALL) 500 MG tablet Take 1 tablet (500 mg) by mouth 2 times daily. 180 tablet 3     No current facility-administered medications for this visit.      Vitals:   BP (!) 149/73 (BP Location: Right arm, Patient Position: Sitting)   Pulse 74   Ht 1.829 m (6' 0.01\")   Wt 117.5 kg (259 lb)   SpO2 97%   BMI 35.12 kg/m      Physical Exam:   In general he looks quite well. HEENT exam shows no scleral icterus or temporal muscle wasting. Chest is clear. Abdominal exam shows no increase in girth. No masses or tenderness to palpation are present. Liver is 10 cm in span without left lobe enlargement. No spleen tip is palpable.  His incision is intact. Extremity exam shows no edema. Skin exam shows no suspicious lesions and neurologic exam is nonfocal.     Labs:   Lab Results   Component Value Date     02/28/2025    POTASSIUM 4.6 02/28/2025    CHLORIDE 109 (H) 02/28/2025    ANIONGAP 12 02/28/2025    CO2 21 (L) 02/28/2025    BUN 37.8 (H) 02/28/2025    CR 1.89 (H) 02/28/2025    GFRESTIMATED 40 (L) 02/28/2025    JACOB 8.7 (L) 02/28/2025      Lab Results   Component Value Date    WBC 4.0 02/28/2025    HGB 9.7 (L) 02/28/2025    HCT 30.6 (L) 02/28/2025    MCV 93 02/28/2025    MCH 29.6 02/28/2025    MCHC 31.7 02/28/2025    RDW 13.3 02/28/2025     (L) " 02/28/2025     Lab Results   Component Value Date    ALBUMIN 4.2 02/28/2025    ALKPHOS 159 (H) 02/28/2025    AST 29 02/28/2025    BILICONJ 0.0 06/28/2012     Lab Results   Component Value Date    INR 0.89 06/01/2018     Recent Labs   Lab Test 02/28/25  1650 11/01/24  0723 09/16/24  1657 07/08/24  1647 05/23/24  0721 03/01/24  1712 01/09/24  1714 11/30/23  1648 09/25/23  1029 07/31/23  1704   ALKPHOS 159* 176* 177* 155* 155* 144 162* 164* 153* 173*   ALT 30 16 18 20 22 23 21 23 24 27   AST 29 22 20 27 21 24 26 25 28 32      Imaging:   No images are attached to the encounter.     Assessment/Plan:   IMPRESSION:   My impression is Mr. Bhagat is doing well now more than 8 years status post liver transplantation.  He is due to have follow-up blood testing.  His liver tests have been quite good. He does have chronic kidney disease as well, and similarly, that remains stable with a GFR of 40.  I am concerned about his blood pressure.  He is requiring fairly significant doses of blood pressure medications but it sounds as though his blood pressure has been under better control as of late     He also has diabetes and his A1c is excellent on a GLP 1 receptor agonist.  He is up to date with regard to vaccines.  For other cancer screening, he did see the dermatologist and had his first squamous cell cancer.  He is up to date with regard to screening colonoscopy as well.  His last bone density study was essentially normal.        My plan will be to see him back in the clinic again in 6 months.     I did spend a total of 40 minutes (on the date of the encounter), including 30 minutes of face-to-face clinic time including counseling. The rest of the time was spent in documentation and review of records.    Thank you very much for allowing me to participate in the care of this patient.  If you have any questions regarding my recommendations, please do not hesitate to contact me.         Toñito Camejo MD      Professor of  Medicine  [employer]      Executive Medical Director, Solid Organ Transplant Program  [facility]    Again, thank you for allowing me to participate in the care of your patient.        Sincerely,        Toñito Camejo MD    Electronically signed

## 2025-05-12 NOTE — NURSING NOTE
"Chief Complaint   Patient presents with    RECHECK     CKD STAGE 3   LIVER TX (H)      BP (!) 149/73 (BP Location: Right arm, Patient Position: Sitting)   Pulse 74   Ht 1.829 m (6' 0.01\")   Wt 117.5 kg (259 lb)   SpO2 97%   BMI 35.12 kg/m    Carlyn Mendes CMA on 5/12/2025 at 3:54 PM    "

## 2025-05-21 ENCOUNTER — RESULTS FOLLOW-UP (OUTPATIENT)
Dept: GASTROENTEROLOGY | Facility: CLINIC | Age: 60
End: 2025-05-21

## 2025-05-21 ENCOUNTER — LAB (OUTPATIENT)
Dept: LAB | Facility: CLINIC | Age: 60
End: 2025-05-21
Payer: COMMERCIAL

## 2025-05-21 DIAGNOSIS — D63.8 ANEMIA IN OTHER CHRONIC DISEASES CLASSIFIED ELSEWHERE: ICD-10-CM

## 2025-05-21 DIAGNOSIS — Z94.4 LIVER REPLACED BY TRANSPLANT (H): ICD-10-CM

## 2025-05-21 DIAGNOSIS — E87.5 HYPERKALEMIA: ICD-10-CM

## 2025-05-21 DIAGNOSIS — N18.31 STAGE 3A CHRONIC KIDNEY DISEASE (H): ICD-10-CM

## 2025-05-21 DIAGNOSIS — D63.8 ANEMIA IN OTHER CHRONIC DISEASES CLASSIFIED ELSEWHERE: Primary | ICD-10-CM

## 2025-05-21 LAB
ALBUMIN SERPL BCG-MCNC: 4.1 G/DL (ref 3.5–5.2)
ALP SERPL-CCNC: 158 U/L (ref 40–150)
ALT SERPL W P-5'-P-CCNC: 18 U/L (ref 0–70)
ANION GAP SERPL CALCULATED.3IONS-SCNC: 10 MMOL/L (ref 7–15)
AST SERPL W P-5'-P-CCNC: 19 U/L (ref 0–45)
BILIRUB SERPL-MCNC: 0.4 MG/DL
BILIRUBIN DIRECT (ROCHE PRO & PURE): 0.19 MG/DL (ref 0–0.45)
BUN SERPL-MCNC: 33.4 MG/DL (ref 8–23)
CALCIUM SERPL-MCNC: 8.8 MG/DL (ref 8.8–10.4)
CHLORIDE SERPL-SCNC: 107 MMOL/L (ref 98–107)
CREAT SERPL-MCNC: 1.99 MG/DL (ref 0.67–1.17)
EGFRCR SERPLBLD CKD-EPI 2021: 38 ML/MIN/1.73M2
ERYTHROCYTE [DISTWIDTH] IN BLOOD BY AUTOMATED COUNT: 13.5 % (ref 10–15)
GLUCOSE SERPL-MCNC: 121 MG/DL (ref 70–99)
HCO3 SERPL-SCNC: 20 MMOL/L (ref 22–29)
HCT VFR BLD AUTO: 27.9 % (ref 40–53)
HGB BLD-MCNC: 9.2 G/DL (ref 13.3–17.7)
MCH RBC QN AUTO: 30.8 PG (ref 26.5–33)
MCHC RBC AUTO-ENTMCNC: 33 G/DL (ref 31.5–36.5)
MCV RBC AUTO: 93 FL (ref 78–100)
PHOSPHATE SERPL-MCNC: 3.5 MG/DL (ref 2.5–4.5)
PLATELET # BLD AUTO: 105 10E3/UL (ref 150–450)
POTASSIUM SERPL-SCNC: 5 MMOL/L (ref 3.4–5.3)
PROT SERPL-MCNC: 6.1 G/DL (ref 6.4–8.3)
RBC # BLD AUTO: 2.99 10E6/UL (ref 4.4–5.9)
RETICS # AUTO: 0.07 10E6/UL (ref 0.03–0.1)
RETICS/RBC NFR AUTO: 2.3 % (ref 0.5–2)
SODIUM SERPL-SCNC: 137 MMOL/L (ref 135–145)
TACROLIMUS BLD-MCNC: 3.6 UG/L (ref 5–15)
TME LAST DOSE: ABNORMAL H
TME LAST DOSE: ABNORMAL H
WBC # BLD AUTO: 3.5 10E3/UL (ref 4–11)

## 2025-05-21 PROCEDURE — 80197 ASSAY OF TACROLIMUS: CPT | Performed by: INTERNAL MEDICINE

## 2025-05-21 PROCEDURE — 99000 SPECIMEN HANDLING OFFICE-LAB: CPT | Performed by: PATHOLOGY

## 2025-05-21 PROCEDURE — 82248 BILIRUBIN DIRECT: CPT | Performed by: PATHOLOGY

## 2025-05-21 PROCEDURE — 85045 AUTOMATED RETICULOCYTE COUNT: CPT | Performed by: PATHOLOGY

## 2025-05-21 PROCEDURE — 36415 COLL VENOUS BLD VENIPUNCTURE: CPT | Performed by: PATHOLOGY

## 2025-05-21 PROCEDURE — 84100 ASSAY OF PHOSPHORUS: CPT | Performed by: PATHOLOGY

## 2025-05-21 PROCEDURE — 80053 COMPREHEN METABOLIC PANEL: CPT | Performed by: PATHOLOGY

## 2025-05-21 PROCEDURE — 85027 COMPLETE CBC AUTOMATED: CPT | Performed by: PATHOLOGY

## 2025-05-29 ENCOUNTER — MYC MEDICAL ADVICE (OUTPATIENT)
Dept: DERMATOLOGY | Facility: CLINIC | Age: 60
End: 2025-05-29
Payer: COMMERCIAL

## 2025-06-05 ENCOUNTER — TELEPHONE (OUTPATIENT)
Dept: TRANSPLANT | Facility: CLINIC | Age: 60
End: 2025-06-05
Payer: COMMERCIAL

## 2025-06-05 DIAGNOSIS — Z79.60 LONG-TERM USE OF IMMUNOSUPPRESSANT MEDICATION: ICD-10-CM

## 2025-06-05 DIAGNOSIS — D64.9 ANEMIA: ICD-10-CM

## 2025-06-05 DIAGNOSIS — Z94.4 LIVER REPLACED BY TRANSPLANT (H): Primary | ICD-10-CM

## 2025-06-05 NOTE — TELEPHONE ENCOUNTER
Issue:   Reticulocyte Count    Plan:   Dr. Camejo would like Heme referral sent for ongoing Anemia and Reticulocyte Count. Call patient    Outcome:   Called patient, left VM   Patient called back, would like Heme referral sent in [facility] so he can schedule in [place]. Order placed

## 2025-06-09 ENCOUNTER — OFFICE VISIT (OUTPATIENT)
Dept: OPHTHALMOLOGY | Facility: CLINIC | Age: 60
End: 2025-06-09
Payer: COMMERCIAL

## 2025-06-09 DIAGNOSIS — Z79.4 TYPE 2 DIABETES MELLITUS WITH BOTH EYES AFFECTED BY MILD NONPROLIFERATIVE RETINOPATHY AND MACULAR EDEMA, WITH LONG-TERM CURRENT USE OF INSULIN (H): ICD-10-CM

## 2025-06-09 DIAGNOSIS — E11.311 DIABETIC MACULAR EDEMA, LEFT EYE (H): Primary | ICD-10-CM

## 2025-06-09 DIAGNOSIS — H35.81 DIABETIC MACULAR EDEMA, LEFT EYE (H): Primary | ICD-10-CM

## 2025-06-09 DIAGNOSIS — E11.3213 TYPE 2 DIABETES MELLITUS WITH BOTH EYES AFFECTED BY MILD NONPROLIFERATIVE RETINOPATHY AND MACULAR EDEMA, WITH LONG-TERM CURRENT USE OF INSULIN (H): ICD-10-CM

## 2025-06-09 PROCEDURE — 250N000011 HC RX IP 250 OP 636

## 2025-06-09 PROCEDURE — 67028 INJECTION EYE DRUG: CPT | Mod: LT

## 2025-06-09 PROCEDURE — 92134 CPTRZ OPH DX IMG PST SGM RTA: CPT

## 2025-06-09 PROCEDURE — 99213 OFFICE O/P EST LOW 20 MIN: CPT

## 2025-06-09 RX ADMIN — Medication 1.25 MG: at 08:52

## 2025-06-09 ASSESSMENT — EXTERNAL EXAM - LEFT EYE: OS_EXAM: NORMAL

## 2025-06-09 ASSESSMENT — TONOMETRY
IOP_METHOD: TONOPEN
OD_IOP_MMHG: 15
OS_IOP_MMHG: 13

## 2025-06-09 ASSESSMENT — SLIT LAMP EXAM - LIDS
COMMENTS: NORMAL
COMMENTS: NORMAL

## 2025-06-09 ASSESSMENT — CUP TO DISC RATIO
OS_RATIO: 0.1
OD_RATIO: 0.1

## 2025-06-09 ASSESSMENT — VISUAL ACUITY
OS_SC: 20/30
METHOD: SNELLEN - LINEAR
OD_SC: 20/20
OD_SC+: -3

## 2025-06-09 ASSESSMENT — EXTERNAL EXAM - RIGHT EYE: OD_EXAM: NORMAL

## 2025-06-09 NOTE — NURSING NOTE
Chief Complaints and History of Present Illnesses   Patient presents with    Follow Up     DM 2  Cystoid macular edema,     Chief Complaint(s) and History of Present Illness(es)       Follow Up              Comments: DM 2  Cystoid macular edema,              Comments    Pt states good vision after cataract surgery   No flashes or floaters   No eye pain or redness  LBS: 129 (now)     Last A1C: 5.0  Lab Results       Component                Value               Date                       A1C                      5.0                 02/28/2025                 A1C                      6.2                 07/05/2024                 A1C                      5.8                 03/01/2024                 A1C                      6.2                 11/30/2023                 A1C                      5.7                 07/31/2023                 A1C                      6.3                 03/31/2021                 A1C                      6.7                 02/12/2021                 A1C                      6.4                 11/24/2020                 A1C                      6.2                 10/06/2020                 A1C                      5.5                 04/11/2018          Casandra Villanueva COT 7:37 AM June 9, 2025

## 2025-06-09 NOTE — PROGRESS NOTES
CC: Macular edema evaluation     Interval: Pt states good vision after cataract surgery No flashes or floaters No eye pain or redness LBS: 129 (now) Last A1C: 5.0 Lab Results Component Value Date A1C 5.0 02/28/2025. Feels his vision is good in each eye. Sometimes the left eye is a little more blurry than the right eye.     HPI: 59 year old male with history of DM type II, hepatocellular carcinoma, RA, liver transplant, CMV colitis and CKD who presents for evaluation of macular edema. Following with Dr. Roque who referred for macular edema each eye.     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   - Most likely due to diabetes rather than post surgical given bilateral   - A1c 6.2 have been under excellent control for years  - 06/09/25 IRF central OS VA 20/30 patient notices; worsening OCT since 11/2024; start MEERA OS x3   - RTC 1 month injection only OS, OCT mac    # Mild hypertensive retinopathy   Grade 1 both eyes   Before ~170s but now on 6 blood pressure meds and feels well controlled  Monitor    # Pseudophakia OS  - Patient happy; sees well without glasses    - RTC 1 month injection only OS, OCT mac    Guillermo Dooley MD  Vitreoretinal Surgery Fellow     Taye Parr MD     Medical Retina  [facility]     Attending Physician Attestation:  Complete documentation of historical and exam elements from today's encounter can be found in the full encounter summary report (not reduplicated in this progress note).  I personally obtained the chief complaint(s) and history of present illness.  I confirmed and edited as necessary  the review of systems, past medical/surgical history, family history, social history, and examination findings as documented by others; and I examined the patient myself.  I personally reviewed the relevant tests, images, and reports as documented above.  I formulated and edited as necessary the assessment and plan and discussed the findings and management plan with the patient and family. Taye Parr MD

## 2025-06-14 ENCOUNTER — HEALTH MAINTENANCE LETTER (OUTPATIENT)
Age: 60
End: 2025-06-14

## 2025-06-24 ENCOUNTER — VIRTUAL VISIT (OUTPATIENT)
Dept: ORTHOPEDICS | Facility: CLINIC | Age: 60
End: 2025-06-24
Payer: COMMERCIAL

## 2025-06-24 DIAGNOSIS — G89.29 CHRONIC NECK PAIN: ICD-10-CM

## 2025-06-24 DIAGNOSIS — M47.812 CERVICAL SPONDYLOSIS: ICD-10-CM

## 2025-06-24 DIAGNOSIS — M54.12 CERVICAL RADICULOPATHY: Primary | ICD-10-CM

## 2025-06-24 DIAGNOSIS — M54.2 CHRONIC NECK PAIN: ICD-10-CM

## 2025-06-24 PROCEDURE — 98005 SYNCH AUDIO-VIDEO EST LOW 20: CPT | Performed by: PHYSICIAN ASSISTANT

## 2025-06-24 NOTE — PROGRESS NOTES
"Camacho Bhagat is a 59 year old male who is being evaluated via a billable video visit.      The patient has been notified of following:      \"This video visit will be conducted via a call between you and your physician/provider. We have found that certain health care needs can be provided without the need for an in-person physical exam.  This service lets us provide the care you need with a video conversation.  If a prescription is necessary we can send it directly to your pharmacy.  If lab work is needed we can place an order for that and you can then stop by our lab to have the test done at a later time.     video visits are billed at different rates depending on your insurance coverage.  Please reach out to your insurance provider with any questions.     If during the course of the call the physician/provider feels a telephone visit is not appropriate, you will not be charged for this service.\"    I have reviewed and updated the patient's Past Medical History, Social History, Family History and Medication List.    ALLERGIES  Erythromycin, Losartan, and Vioxx    Spine Surgery Follow Up    REFERRING PHYSICIAN: No ref. provider found   PRIMARY CARE PHYSICIAN: Shay Kirkpatrick           Chief Complaint:   No chief complaint on file.      History of Present Illness:  Symptom Profile Including: location of symptoms, onset, severity, exacerbating/alleviating factors, previous treatments:        Camacho Bhagat is a 59 year old male who presents today for evaluation of acute on chronic neck pain, R arm symptoms.     Chronic neck pain, worsening last 3 weeks. No new injury/trauma  Numbness and tingling radiating from neck into R arm , into biceps region.  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  Has chronic balance issues, not worsening  No recent worsening dexterity or fine motor movements.    KEVIN Scores:  Oswestry (KEVIN) Questionnaire        3/17/2025     8:01 AM   OSWESTRY DISABILITY INDEX "   Count 9    Sum 19    Oswestry Score (%) 42.22 %        Patient-reported        Neck Disability Index (NDI) Questionnaire        1/31/2025    11:12 AM   Neck Disability Index (NDI)   Neck Disability Index: Count 10   NDI: Total Score = SUM (points for all 10 findings) 10   Neck Disability in Percent = (Total Score) / 50 * 100 20 (%)             Physical Exam:   This was a video visit, so very limited exam could be performed.  On video, patient appeared and sounced alert, oriented x 3, cooperative, with coherent speech, and not in cardiorespiratory distress.  Able to respond to questions appropriately and follow instructions.           Imaging:   No new imaging obtained today.  Reviewed previous EOS full spine x-rays from 12/23/2024 which demonstrates some loss of cervical lordosis.  Mild-moderate multilevel degenerative changes.  No fracture or spondylolisthesis.             Assessment and Plan:   Assessment:  59 year old male with  Chronic axial thoracolumbar back pain  Chronic axial neck pain; acute worsening past 3 weeks with new onset R radicular symptoms, subjective R biceps weakness  Bilateral greater trochanter bursitis  Osteopenia (2024 DEXA most negative T-score -1.2)      Plan:  Patient reporting new onset R cervical radicular pain, acute on chronic axial neck pain and new R biceps weakness. Pain is debilitating and weakness is new. Recommend MRI to evaluate. Will follow up after to review results, discuss treatment options. He will keep me updated if weakness worsens or develops worsening balance/fine motor difficulties.    - Ordered MRI cervical w/o contrast.   - Follow up after above to review results, discuss treatment options.    Respectfully,  Gisselle Mancilla (ruth Clayton) , PAANA  Orthopaedic Spine Surgery  Dept Orthopaedic Surgery, [facility]  [facility] [place] Phone: [phone]    Dictation Disclaimer: Some of this Note has been completed with voice-recognition dictation software. Although errors are  generally corrected real-time, there is the potential for a rare error to be present in the completed chart.      Start time of call: 11:50am  End time of call: 12am  Total call duration: 10min  Location of provider during call: [facility] [place]  Location of patient - [place], [place]

## 2025-06-24 NOTE — LETTER
"6/24/2025      Camacho Bhagat  [address] [facility] [address]      Dear Colleague,    Thank you for referring your patient, Camacho Bhagat, to the [facility] [place]. Please see a copy of my visit note below.    Camacho Bhagat is a 59 year old male who is being evaluated via a billable video visit.      The patient has been notified of following:      \"This video visit will be conducted via a call between you and your physician/provider. We have found that certain health care needs can be provided without the need for an in-person physical exam.  This service lets us provide the care you need with a video conversation.  If a prescription is necessary we can send it directly to your pharmacy.  If lab work is needed we can place an order for that and you can then stop by our lab to have the test done at a later time.     video visits are billed at different rates depending on your insurance coverage.  Please reach out to your insurance provider with any questions.     If during the course of the call the physician/provider feels a telephone visit is not appropriate, you will not be charged for this service.\"    I have reviewed and updated the patient's Past Medical History, Social History, Family History and Medication List.    ALLERGIES  Erythromycin, Losartan, and Vioxx    Spine Surgery Follow Up    REFERRING PHYSICIAN: No ref. provider found   PRIMARY CARE PHYSICIAN: Shay Kirkpatrick           Chief Complaint:   No chief complaint on file.      History of Present Illness:  Symptom Profile Including: location of symptoms, onset, severity, exacerbating/alleviating factors, previous treatments:        Camacho Bhagat is a 59 year old male who presents today for evaluation of acute on chronic neck pain, R arm symptoms.     Chronic neck pain, worsening last 3 weeks. No new injury/trauma  Numbness and tingling radiating from neck into R arm , into biceps region.  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  Has chronic balance issues, not worsening  No recent worsening dexterity or fine motor movements.    KEVIN Scores:  Oswestry (KEVIN) Questionnaire        3/17/2025     8:01 AM   OSWESTRY DISABILITY INDEX   Count 9    Sum 19    Oswestry Score (%) 42.22 %        Patient-reported        Neck Disability Index (NDI) Questionnaire        1/31/2025    11:12 AM   Neck Disability Index (NDI)   Neck Disability Index: Count 10   NDI: Total Score = SUM (points for all 10 findings) 10   Neck Disability in Percent = (Total Score) / 50 * 100 20 (%)             Physical Exam:   This was a video visit, so very limited exam could be performed.  On video, patient appeared and sounced alert, oriented x 3, cooperative, with coherent speech, and not in cardiorespiratory distress.  Able to respond to questions appropriately and follow instructions.           Imaging:   No new imaging obtained today.  Reviewed previous EOS full spine x-rays from 12/23/2024 which demonstrates some loss of cervical lordosis.  Mild-moderate multilevel degenerative changes.  No fracture or spondylolisthesis.             Assessment and Plan:   Assessment:  59 year old male with  Chronic axial thoracolumbar back pain  Chronic axial neck pain; acute worsening past 3 weeks with new onset R radicular symptoms, subjective R biceps weakness  Bilateral greater trochanter bursitis  Osteopenia (2024 DEXA most negative T-score -1.2)      Plan:  Patient reporting new onset R cervical radicular pain, acute on chronic axial neck pain and new R biceps weakness. Pain is debilitating and weakness is new. Recommend MRI to evaluate. Will follow up after to review results, discuss treatment options. He will keep me updated if weakness worsens or develops worsening balance/fine motor difficulties.    - Ordered MRI cervical w/o contrast.   - Follow up after above to review results, discuss treatment options.    Respectfully,  Gisselle  COLETTE Mancilla (a.k.a. Billing)  Orthopaedic Spine Surgery  Dept Orthopaedic Surgery, [facility]  [facility] Phone: [phone]    Dictation Disclaimer: Some of this Note has been completed with voice-recognition dictation software. Although errors are generally corrected real-time, there is the potential for a rare error to be present in the completed chart.      Start time of call: 11:50am  End time of call: 12am  Total call duration: 10min  Location of provider during call: [facility]  Location of patient - [place], [place]        Again, thank you for allowing me to participate in the care of your patient.        Sincerely,        Gisselle Mancilla PA-C    Electronically signed

## 2025-06-30 ENCOUNTER — MYC REFILL (OUTPATIENT)
Dept: GASTROENTEROLOGY | Facility: CLINIC | Age: 60
End: 2025-06-30
Payer: COMMERCIAL

## 2025-06-30 DIAGNOSIS — Z94.4 LIVER TRANSPLANT RECIPIENT (H): ICD-10-CM

## 2025-06-30 RX ORDER — TACROLIMUS 1 MG/1
CAPSULE ORAL
Qty: 630 CAPSULE | Refills: 3 | Status: SHIPPED | OUTPATIENT
Start: 2025-06-30

## 2025-07-05 DIAGNOSIS — D64.9 ANEMIA, UNSPECIFIED TYPE: Primary | ICD-10-CM

## 2025-07-05 DIAGNOSIS — Z94.4 LIVER TRANSPLANT RECIPIENT (H): ICD-10-CM

## 2025-07-05 DIAGNOSIS — D84.9 IMMUNOSUPPRESSED STATUS: ICD-10-CM

## 2025-07-07 ENCOUNTER — OFFICE VISIT (OUTPATIENT)
Dept: OPHTHALMOLOGY | Facility: CLINIC | Age: 60
End: 2025-07-07
Payer: COMMERCIAL

## 2025-07-07 DIAGNOSIS — Z79.4 TYPE 2 DIABETES MELLITUS WITH BOTH EYES AFFECTED BY MILD NONPROLIFERATIVE RETINOPATHY AND MACULAR EDEMA, WITH LONG-TERM CURRENT USE OF INSULIN (H): ICD-10-CM

## 2025-07-07 DIAGNOSIS — E11.3213 TYPE 2 DIABETES MELLITUS WITH BOTH EYES AFFECTED BY MILD NONPROLIFERATIVE RETINOPATHY AND MACULAR EDEMA, WITH LONG-TERM CURRENT USE OF INSULIN (H): ICD-10-CM

## 2025-07-07 PROCEDURE — 92134 CPTRZ OPH DX IMG PST SGM RTA: CPT | Mod: 26

## 2025-07-07 PROCEDURE — 2022F DILAT RTA XM EVC RTNOPTHY: CPT

## 2025-07-07 PROCEDURE — 99213 OFFICE O/P EST LOW 20 MIN: CPT

## 2025-07-07 PROCEDURE — 92134 CPTRZ OPH DX IMG PST SGM RTA: CPT

## 2025-07-07 PROCEDURE — 250N000011 HC RX IP 250 OP 636

## 2025-07-07 PROCEDURE — 67028 INJECTION EYE DRUG: CPT | Mod: LT

## 2025-07-07 RX ADMIN — Medication 1.25 MG: at 13:26

## 2025-07-07 ASSESSMENT — TONOMETRY
OD_IOP_MMHG: 11
OD_IOP_MMHG: --
IOP_METHOD: ICARE
OS_IOP_MMHG: 10
IOP_METHOD: TONOPEN
OS_IOP_MMHG: 11

## 2025-07-07 ASSESSMENT — VISUAL ACUITY
METHOD: SNELLEN - LINEAR
OD_SC+: -3
OS_SC: 20/50
OD_SC: 20/25

## 2025-07-07 NOTE — PROGRESS NOTES
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   - Most likely due to diabetes rather than post surgical given bilateral   - A1c 6.2 have been under excellent control for years  - 06/09/25 IRF central OS VA 20/30 patient notices; worsening OCT since 11/2024; currently on MEERA OS   - will give Avastin injection today OS  - RTC 1 month OCT macula and possible injection OS    # Mild hypertensive retinopathy   Grade 1 both eyes   Before ~170s but now on 6 blood pressure meds and feels well controlled  Monitor    # Pseudophakia OS  - Patient happy; sees well without glasses    - RTC 1 month OCT macula, possible injection OS    Taye Parr MD     [facility]  [facility]     Attending Physician Attestation: Complete documentation of historical and exam elements from today's encounter can be found in the full encounter summary report (not reduplicated in this progress note). I personally obtained the chief complaint(s) and history of present illness. I confirmed and edited as necessary the review of systems, past medical/surgical history, family history, social history, and examination findings as documented by others; and I examined the patient myself. I personally reviewed the relevant tests, images, and reports as documented above. I formulated and edited as necessary the assessment and plan and discussed the findings and  management plan with the patient and family.  I was present for the entire procedure(s).   Taye Willett MD

## 2025-07-07 NOTE — NURSING NOTE
Chief Complaints and History of Present Illnesses   Patient presents with    Diabetic Macular Edema Follow Up     Chief Complaint(s) and History of Present Illness(es)       Diabetic Macular Edema Follow Up               Comments    Patient states no changes in vision since he was last seen. No pain or irritation. No flashes of light or floaters.    Ocular Meds: None    Lab Results       Component                Value               Date                       A1C                      5.0                 02/28/2025                 A1C                      6.2                 07/05/2024                 A1C                      5.8                 03/01/2024                 A1C                      6.2                 11/30/2023                 A1C                      5.7                 07/31/2023                 A1C                      6.3                 03/31/2021                 A1C                      6.7                 02/12/2021                 A1C                      6.4                 11/24/2020                 A1C                      6.2                 10/06/2020                 A1C                      5.5                 04/11/2018                Rufina RODRIGUEZ 12:46 PM July 7, 2025

## 2025-07-09 ASSESSMENT — SLIT LAMP EXAM - LIDS
COMMENTS: NORMAL
COMMENTS: NORMAL

## 2025-07-09 ASSESSMENT — EXTERNAL EXAM - LEFT EYE: OS_EXAM: NORMAL

## 2025-07-09 ASSESSMENT — EXTERNAL EXAM - RIGHT EYE: OD_EXAM: NORMAL

## 2025-07-13 LAB
DAT POLY: NEGATIVE
SPECIMEN EXP DATE BLD: NORMAL

## 2025-07-14 ENCOUNTER — LAB (OUTPATIENT)
Dept: LAB | Facility: CLINIC | Age: 60
End: 2025-07-14
Payer: COMMERCIAL

## 2025-07-14 DIAGNOSIS — D84.9 IMMUNOSUPPRESSED STATUS: ICD-10-CM

## 2025-07-14 DIAGNOSIS — D64.9 ANEMIA, UNSPECIFIED TYPE: ICD-10-CM

## 2025-07-14 DIAGNOSIS — Z94.4 LIVER REPLACED BY TRANSPLANT (H): ICD-10-CM

## 2025-07-14 DIAGNOSIS — Z94.4 LIVER TRANSPLANT RECIPIENT (H): ICD-10-CM

## 2025-07-14 LAB
ALBUMIN SERPL BCG-MCNC: 4.3 G/DL (ref 3.5–5.2)
ALP SERPL-CCNC: 165 U/L (ref 40–150)
ALT SERPL W P-5'-P-CCNC: 21 U/L (ref 0–70)
ANION GAP SERPL CALCULATED.3IONS-SCNC: 14 MMOL/L (ref 7–15)
AST SERPL W P-5'-P-CCNC: 21 U/L (ref 0–45)
BASOPHILS # BLD AUTO: 0 10E3/UL (ref 0–0.2)
BASOPHILS NFR BLD AUTO: 1 %
BILIRUB SERPL-MCNC: 0.7 MG/DL
BILIRUBIN DIRECT (ROCHE PRO & PURE): 0.32 MG/DL (ref 0–0.45)
BUN SERPL-MCNC: 28.6 MG/DL (ref 8–23)
CALCIUM SERPL-MCNC: 9 MG/DL (ref 8.8–10.4)
CHLORIDE SERPL-SCNC: 107 MMOL/L (ref 98–107)
CREAT SERPL-MCNC: 2.02 MG/DL (ref 0.67–1.17)
CRP SERPL-MCNC: 11.2 MG/L
EGFRCR SERPLBLD CKD-EPI 2021: 37 ML/MIN/1.73M2
EOSINOPHIL # BLD AUTO: 0.1 10E3/UL (ref 0–0.7)
EOSINOPHIL NFR BLD AUTO: 1 %
ERYTHROCYTE [DISTWIDTH] IN BLOOD BY AUTOMATED COUNT: 13.6 % (ref 10–15)
ERYTHROCYTE [SEDIMENTATION RATE] IN BLOOD BY WESTERGREN METHOD: 23 MM/HR (ref 0–20)
FERRITIN SERPL-MCNC: 628 NG/ML (ref 31–409)
GLUCOSE SERPL-MCNC: 203 MG/DL (ref 70–99)
HAPTOGLOB SERPL-MCNC: 89 MG/DL (ref 30–200)
HCO3 SERPL-SCNC: 19 MMOL/L (ref 22–29)
HCT VFR BLD AUTO: 29.6 % (ref 40–53)
HCT VFR BLD AUTO: 29.6 % (ref 40–53)
HGB BLD-MCNC: 9.5 G/DL (ref 13.3–17.7)
IMM GRANULOCYTES # BLD: 0 10E3/UL
IMM GRANULOCYTES NFR BLD: 1 %
IRON BINDING CAPACITY (ROCHE): 211 UG/DL (ref 240–430)
IRON SATN MFR SERPL: 52 % (ref 15–46)
IRON SERPL-MCNC: 110 UG/DL (ref 61–157)
LDH SERPL L TO P-CCNC: 178 U/L (ref 0–250)
LYMPHOCYTES # BLD AUTO: 0.7 10E3/UL (ref 0.8–5.3)
LYMPHOCYTES NFR BLD AUTO: 17 %
MAGNESIUM SERPL-MCNC: 1.9 MG/DL (ref 1.7–2.3)
MCH RBC QN AUTO: 29.8 PG (ref 26.5–33)
MCHC RBC AUTO-ENTMCNC: 32.1 G/DL (ref 31.5–36.5)
MCV RBC AUTO: 93 FL (ref 78–100)
MCV RBC AUTO: 93 FL (ref 78–100)
MONOCYTES # BLD AUTO: 0.3 10E3/UL (ref 0–1.3)
MONOCYTES NFR BLD AUTO: 7 %
NEUTROPHILS # BLD AUTO: 2.8 10E3/UL (ref 1.6–8.3)
NEUTROPHILS NFR BLD AUTO: 73 %
NRBC # BLD AUTO: 0 10E3/UL
NRBC BLD AUTO-RTO: 0 /100
PATH REPORT.COMMENTS IMP SPEC: NORMAL
PATH REPORT.COMMENTS IMP SPEC: NORMAL
PATH REPORT.FINAL DX SPEC: NORMAL
PATH REPORT.MICROSCOPIC SPEC OTHER STN: NORMAL
PATH REPORT.MICROSCOPIC SPEC OTHER STN: NORMAL
PATH REPORT.RELEVANT HX SPEC: NORMAL
PLATELET # BLD AUTO: 103 10E3/UL (ref 150–450)
POTASSIUM SERPL-SCNC: 4.8 MMOL/L (ref 3.4–5.3)
PROT SERPL-MCNC: 6.3 G/DL (ref 6.4–8.3)
RBC # BLD AUTO: 3.19 10E6/UL (ref 4.4–5.9)
RETICS # AUTO: 0.08 10E6/UL (ref 0.03–0.1)
RETICS/RBC NFR AUTO: 2.4 % (ref 0.5–2)
SODIUM SERPL-SCNC: 140 MMOL/L (ref 135–145)
TOTAL PROTEIN SERUM FOR ELP: 5.8 G/DL (ref 6.4–8.3)
TSH SERPL DL<=0.005 MIU/L-ACNC: 3.43 UIU/ML (ref 0.3–4.2)
URATE SERPL-MCNC: 8.6 MG/DL (ref 3.4–7)
VIT B12 SERPL-MCNC: 299 PG/ML (ref 232–1245)
WBC # BLD AUTO: 3.8 10E3/UL (ref 4–11)

## 2025-07-14 PROCEDURE — 99000 SPECIMEN HANDLING OFFICE-LAB: CPT | Performed by: PATHOLOGY

## 2025-07-14 PROCEDURE — 85652 RBC SED RATE AUTOMATED: CPT | Performed by: PATHOLOGY

## 2025-07-14 PROCEDURE — 83735 ASSAY OF MAGNESIUM: CPT | Performed by: PATHOLOGY

## 2025-07-14 PROCEDURE — 84165 PROTEIN E-PHORESIS SERUM: CPT | Mod: TC | Performed by: PATHOLOGY

## 2025-07-14 PROCEDURE — 83550 IRON BINDING TEST: CPT | Performed by: PATHOLOGY

## 2025-07-14 PROCEDURE — 82607 VITAMIN B-12: CPT | Performed by: INTERNAL MEDICINE

## 2025-07-14 PROCEDURE — 36415 COLL VENOUS BLD VENIPUNCTURE: CPT | Performed by: PATHOLOGY

## 2025-07-14 PROCEDURE — 83010 ASSAY OF HAPTOGLOBIN QUANT: CPT | Performed by: INTERNAL MEDICINE

## 2025-07-14 PROCEDURE — 80053 COMPREHEN METABOLIC PANEL: CPT | Performed by: PATHOLOGY

## 2025-07-14 PROCEDURE — 83615 LACTATE (LD) (LDH) ENZYME: CPT | Performed by: PATHOLOGY

## 2025-07-14 PROCEDURE — 86140 C-REACTIVE PROTEIN: CPT | Performed by: PATHOLOGY

## 2025-07-14 PROCEDURE — 82668 ASSAY OF ERYTHROPOIETIN: CPT | Mod: 90 | Performed by: PATHOLOGY

## 2025-07-14 PROCEDURE — 82747 ASSAY OF FOLIC ACID RBC: CPT | Mod: 90 | Performed by: PATHOLOGY

## 2025-07-14 PROCEDURE — 82248 BILIRUBIN DIRECT: CPT | Performed by: PATHOLOGY

## 2025-07-14 PROCEDURE — 85025 COMPLETE CBC W/AUTO DIFF WBC: CPT | Performed by: PATHOLOGY

## 2025-07-14 PROCEDURE — 84550 ASSAY OF BLOOD/URIC ACID: CPT | Performed by: PATHOLOGY

## 2025-07-14 PROCEDURE — 83540 ASSAY OF IRON: CPT | Performed by: PATHOLOGY

## 2025-07-14 PROCEDURE — 86880 COOMBS TEST DIRECT: CPT

## 2025-07-14 PROCEDURE — 85045 AUTOMATED RETICULOCYTE COUNT: CPT | Performed by: PATHOLOGY

## 2025-07-14 PROCEDURE — 84443 ASSAY THYROID STIM HORMONE: CPT | Performed by: PATHOLOGY

## 2025-07-14 PROCEDURE — 82728 ASSAY OF FERRITIN: CPT | Performed by: PATHOLOGY

## 2025-07-15 LAB
ALBUMIN SERPL ELPH-MCNC: 3.7 G/DL (ref 3.7–5.1)
ALPHA1 GLOB SERPL ELPH-MCNC: 0.3 G/DL (ref 0.2–0.4)
ALPHA2 GLOB SERPL ELPH-MCNC: 0.6 G/DL (ref 0.5–0.9)
B-GLOBULIN SERPL ELPH-MCNC: 0.6 G/DL (ref 0.6–1)
EPO SERPL-ACNC: 14 MU/ML
GAMMA GLOB SERPL ELPH-MCNC: 0.6 G/DL (ref 0.7–1.6)
M PROTEIN SERPL ELPH-MCNC: 0 G/DL
PROT PATTERN SERPL ELPH-IMP: ABNORMAL

## 2025-07-16 ENCOUNTER — ONCOLOGY VISIT (OUTPATIENT)
Dept: TRANSPLANT | Facility: CLINIC | Age: 60
End: 2025-07-16
Attending: INTERNAL MEDICINE
Payer: COMMERCIAL

## 2025-07-16 ENCOUNTER — PATIENT OUTREACH (OUTPATIENT)
Dept: ONCOLOGY | Facility: CLINIC | Age: 60
End: 2025-07-16
Payer: COMMERCIAL

## 2025-07-16 VITALS
DIASTOLIC BLOOD PRESSURE: 70 MMHG | RESPIRATION RATE: 18 BRPM | BODY MASS INDEX: 33.86 KG/M2 | OXYGEN SATURATION: 98 % | HEART RATE: 71 BPM | TEMPERATURE: 98.2 F | SYSTOLIC BLOOD PRESSURE: 130 MMHG | WEIGHT: 249.7 LBS

## 2025-07-16 DIAGNOSIS — R79.89 ELEVATED FERRITIN LEVEL: ICD-10-CM

## 2025-07-16 DIAGNOSIS — D64.9 ANEMIA: ICD-10-CM

## 2025-07-16 DIAGNOSIS — N18.32 CHRONIC KIDNEY DISEASE (CKD) STAGE G3B/A1, MODERATELY DECREASED GLOMERULAR FILTRATION RATE (GFR) BETWEEN 30-44 ML/MIN/1.73 SQUARE METER AND ALBUMINURIA CREATININE RATIO LESS THAN 30 MG/G (H): Primary | ICD-10-CM

## 2025-07-16 DIAGNOSIS — Z79.60 LONG-TERM USE OF IMMUNOSUPPRESSANT MEDICATION: ICD-10-CM

## 2025-07-16 DIAGNOSIS — Z94.4 LIVER REPLACED BY TRANSPLANT (H): ICD-10-CM

## 2025-07-16 LAB
FOLATE RBC-MCNC: 758 NG/ML
HCT VFR BLD CALC: NORMAL %

## 2025-07-16 PROCEDURE — 99212 OFFICE O/P EST SF 10 MIN: CPT | Performed by: INTERNAL MEDICINE

## 2025-07-16 RX ORDER — LANOLIN ALCOHOL/MO/W.PET/CERES
1000 CREAM (GRAM) TOPICAL DAILY
Qty: 60 TABLET | Refills: 3 | Status: SHIPPED | OUTPATIENT
Start: 2025-07-16

## 2025-07-16 ASSESSMENT — PAIN SCALES - GENERAL: PAINLEVEL_OUTOF10: SEVERE PAIN (7)

## 2025-07-16 NOTE — NURSING NOTE
"Oncology Rooming Note    July 16, 2025 1:06 PM   Camacho Bhagat is a 59 year old male who presents for:    No chief complaint on file.    Initial Vitals: /70 (BP Location: Right arm, Patient Position: Sitting, Cuff Size: Adult Regular)   Pulse 71   Temp 98.2  F (36.8  C) (Oral)   Resp 18   Wt 113.3 kg (249 lb 11.2 oz)   SpO2 98%   BMI 33.86 kg/m   Estimated body mass index is 33.86 kg/m  as calculated from the following:    Height as of 5/12/25: 1.829 m (6' 0.01\").    Weight as of this encounter: 113.3 kg (249 lb 11.2 oz). Body surface area is 2.4 meters squared.  Severe Pain (7) Comment: Data Unavailable   No LMP for male patient.  Allergies reviewed: Yes  Medications reviewed: Yes    Medications: Medication refills not needed today.  Pharmacy name entered into [employer]: [place], [place] - [address] [place] [address] [phone]    PHQ9:  Did this patient require a PHQ9?: No      Clinical concerns: none.      [place]"

## 2025-07-16 NOTE — LETTER
7/16/2025      Camacho Bhagat  [address] [facility] [place] [address]      Dear Colleague,    Thank you for referring your patient, Camacho Bhagat, to the [facility] BLOOD AND MARROW TRANSPLANT PRO[facility] [place]. Please see a copy of my visit note below.       Hematology/Oncology Consult Note    Camacho Bhagat MRN# [id number]   Age: 59 year old YOB: 1965          Reason for Consult:   Anemia         Assessment and Plan:   Camacho Bhagat is a 59 year old     Problem list:   # History of CASTAÑEDA cirrhosis s/p liver transplant on 3/4/17   # Hx of CMV colitis, s/p partial colectomy  # Macular degeneration wet  #Hypertension  #RONNIE  #Neuropathy  #Type 2 DM  # Splenomegaly  #Elevated Ferritin  # B12 299  #CKD3a    Camacho is a very delightful gentleman who has done very well after liver transplantation for cryptogenic cirrhosis and CASTAÑEDA.  This was complicated by CMV viremia and CMV colitis leading to a partial colectomy.  He is quite fatigued today and in retrospect his hemoglobin has been drifting down over the last 2 years from the 11's to the 9's.  In assessing his laboratory studies he has a normal MCV and an elevated ferritin and a low reticulocyte count.  He has a GFR of 37 and erythropoietin of only 14 for a hematocrit of 29%.  His EPO level should be up near 500.  Therefore part of his anemia is related to a hypoproliferative process related to CKD3a and may be some chronic inflammation with an elevated CRP and other inflammatory markers.  He also has thrombocytopenia suggesting that he may have some hypersplenism as he still has a very enlarged spleen.  I am not sure about why the ferritin is still elevated and iron saturation except it is possible that he did receive many blood transfusions loading him with iron.  He is of [place]  ext[place] and I talked to him about checking it HFE and we will go ahead and do that.  For now I would like to consider trying erythropoietin to increase  his hemoglobin and try to help with his fatigue.  He also has a lowish B12 and suggest that he add B12 1000 mcg daily.    Summary of Recommendations:  Aranesp 100 mcg every 2 weeks keep hemoglobin 10 or greater  Anemia clinic referral  Oral B12 1000 mcg daily  HFE mutation analysis  Monitor MMF and tacrolimus which may be suppressing bone marrow response  Monitor for CMV viremia  Return to clinic in 2 months    Thank you for involving us in the care of this patient.    I spent 60 minutes on the date of the encounter doing chart review, history  and exam, documentation and further coordination as noted above The longitudinal plan of care for the diagnosis(es)/condition(s) as documented were addressed during this visit. Due to the added complexity in care, I will continue to support Camacho in the subsequent management and with ongoing continuity of care.      Lyle Srivastava MD            History of Present Illness:   History obtained from chart review and confirmed with patient.    Camacho Bhagat is a 59 year old        Review of Systems:   A comprehensive ROS was performed with the patient and was found to be negative with the exception of that noted in the HPI above.  CONSTITUTIONAL Very fatigued especially at end of day, appetite ok occ night sweats no weight loss  EYES Bilateral cataracts, macular degeneration wet on avastin  EARS Hearing decreased  RESP No cough, has recently been SOB on exertion no asthmas Has RONNIE on CPA  COR Has hypertension no chest pain on exertions  GI No acid reflux He has diarrhea 1-x/d,liver transplant Hx of CMV colitis no black or bloody stools  MS Hx of Rheumatoid arthritis, arthritis in neck and LS spine Has joint sx in hands elbows shoulders, hips, knees ankles  ENDO Has DM type 2 on insulin, take tirzepatide has lost 50 lb  NEURO no seizures or stroke Neuropathy in lower ext decreased tactile touch  PSYCH mood good  SKIN Squamous CA left lower back MOHS done  ID CMV viremia in  past with colitis COVID 2023  HEME Hx of a IVC filter in 2001 placed as a precaution but no PE  no definite DVT Hx of neutropenia with CMV hx of anemia for several years no bleeding   No urinary tact issue GFR decreased since tx         Past Medical History:     Past Medical History:   Diagnosis Date     Cancer (H) 12/15    Stage 4 liver cancer     Diabetes type 2, controlled (H) 11/10/2016     Esophageal reflux      Fibromyalgia 01/2009    dx with Dr Benitez( Rheum)     Gangrene of finger (H) 08/25/2017     H/O deep venous thrombosis 11/2001    Permanent IVC filter in place.     H/O CASTAÑEDA (nonalcoholic steatohepatitis)      H/O Pneumonia, organism unspecified(486) 10/2001    Included ARDS, sepsis, and  acute renal failure; hospitalized, required tracheostomy placement.     H/O: HTN (hypertension) 11/2001    No longer prescribed antihypertensive medication.       History of hepatocellular carcinoma      History of liver transplant (H)      History of obstructive sleep apnea     No longer wears CPAP since losing approximately 200 pounds with his liver transplant and its complications.       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     Osteoarthritis      Presence of PERMANENT IVC filter      Rheumatoid arthritis(714.0)     remission for many years     SCC (squamous cell carcinoma) 10/23/2024     Skin infection 10/30/2024     Squamous cell skin cancer     on back            Past Surgical History:     Past Surgical History:   Procedure Laterality Date     ARTHROSCOPY KNEE WITH MENISCAL REPAIR Right 2008    Right knee arthroscopy     BENCH LIVER N/A 03/04/2017    Procedure: BENCH LIVER;  Surgeon: Jovan Tran MD;  Location: [facility]     BIOPSY  5/2017    Liver     CHOLECYSTECTOMY       COLONOSCOPY N/A 07/21/2017    Procedure:  COMBINED COLONOSCOPY, SINGLE OR MULTIPLE BIOPSY/POLYPECTOMY BY BIOPSY;  Colonoscopy;  Surgeon: Izaiah Montes MD;  Location: [facility]     COLONOSCOPY N/A 10/24/2022    Procedure: COLONOSCOPY, WITH POLYPECTOMY AND BIOPSY;  Surgeon: Abril Mcneill MD;  Location: [facility]     ENDOSCOPIC RETROGRADE CHOLANGIOPANCREATOGRAM N/A 05/22/2018    Procedure: COMBINED ENDOSCOPIC RETROGRADE CHOLANGIOPANCREATOGRAPHY, PLACE TUBE/STENT;  Endoscopic Retrograde Cholangiopancreatography with sphincterotomy and pancreatic duct stent placement;  Surgeon: Zay Gaitan MD;  Location: [facility]     ENT SURGERY      Repair of deviated septum     ESOPHAGOSCOPY, GASTROSCOPY, DUODENOSCOPY (EGD), COMBINED N/A 08/04/2016    Procedure: COMBINED ESOPHAGOSCOPY, GASTROSCOPY, DUODENOSCOPY (EGD), BIOPSY SINGLE OR MULTIPLE;  Surgeon: Trent Pederson MD;  Location:  GI     ESOPHAGOSCOPY, GASTROSCOPY, DUODENOSCOPY (EGD), COMBINED N/A 08/27/2017    Procedure: COMBINED ESOPHAGOSCOPY, GASTROSCOPY, DUODENOSCOPY (EGD);;  Surgeon: Los Wynn MD;  [facility]: [facility]     ESOPHAGOSCOPY, GASTROSCOPY, DUODENOSCOPY (EGD), COMBINED N/A 08/17/2023    Procedure: Esophagoscopy, gastroscopy, duodenoscopy (EGD), combined;  Surgeon: Trent Villanueva MD;  Location: [facility]     INCISION AND DRAINAGE ABDOMEN WASHOUT, COMBINED Right 02/14/2018    Procedure: COMBINED INCISION AND DRAINAGE ABDOMEN WASHOUT;  COMBINED INCISION AND DRAINAGE right ABDOMEN flank;  Surgeon: Sara Dinh MD;  [facility]     INTRAVITREAL INJECTION GAS/TPA/METHOTREXATE/ANTIBIOTICS Left 3/14/2025    Procedure: INJECTION, PHARMACOLOGIC AGENT, INTRAVITREAL;  Surgeon: Stan Roque MD;  Location: [facility]     LAPAROTOMY EXPLORATORY N/A 07/04/2017    Procedure: LAPAROTOMY EXPLORATORY;  Exploratory Laparotomy, washout;  Surgeon: Tip Zhang MD;  [facility]     LAPAROTOMY EXPLORATORY N/A 07/05/2017    Procedure: LAPAROTOMY EXPLORATORY;  Exploratory  Laparotomy, Washout with closure.;  Surgeon: Tip Zhang MD;  [facility]     LAPAROTOMY EXPLORATORY N/A 2017    Procedure: LAPAROTOMY EXPLORATORY;  Exploratory Laparotomy, Right angelique-colectomy, end ileostomy, mucosal fistula, partial omentectomy;  Surgeon: Sara Dinh MD;  Location: [facility]     LASIK       ORTHOPEDIC SURGERY Right     Repair of dislocated wrist.       PHACOEMULSIFICATION CLEAR CORNEA WITH STANDARD INTRAOCULAR LENS IMPLANT Right 2024    Procedure: RIGHT EYE PHACOEMULSIFICATION, COMPLEX CATARACT, WITH INTRAOCULAR LENS IMPLANT;  Surgeon: Stan Roque MD;  Location: [facility] OR     PHACOEMULSIFICATION WITH STANDARD INTRAOCULAR LENS IMPLANT Left 3/14/2025    Procedure: LEFT EYE PHACOEMULSIFICATION, CATARACT, WITH STANDARD INTRAOCULAR LENS IMPLANT INSERTION;  Surgeon: Stan Roque MD;  Location: [facility] OR     RELEASE TRIGGER FINGER Right 11/10/2017    Procedure: RELEASE TRIGGER FINGER;  Debride, V-Y Flap Right Index Finger;  Surgeon: Momo Noonan MD;  [facility]     TAKEDOWN ILEOSTOMY N/A 2018    Procedure: TAKEDOWN ILEOSTOMY;  Exploratory Laparotomy, Lysis of Adhesions, Takedown Of End Ileostomy, Takedown of mucocutaneous fistula, ileocolic resection  And Colorectal Anastomosis, Primary repair of Ventral Hernia, Anesthesia Block ;  Surgeon: Sara Dinh MD;  [facility]: [facility]     TRACHEOSTOMY  2001    During hospitalization for pneumonia.       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   Vaping Use     Vaping status: Never Used   Substance and Sexual Activity     Alcohol use: Not Currently     Comment: No alcohol since liver transplant.       Drug use: Never     Sexual activity: Yes     Partners: Female     Birth control/protection: None   Other Topics Concern     Parent/sibling w/ CABG, MI or angioplasty before 65F 55M? 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         Social Drivers of Health     Financial Resource Strain: Low Risk  (2024)    Financial Resource Strain      Within the past 12 months, have you or your family members you live with been unable to get utilities (heat, electricity) when it was really needed?: No   Food Insecurity: Low Risk  (2024)    Food Insecurity      Within the past 12 months, did you worry that your food would run out before you got money to buy more?: No      Within the past 12 months, did the food you bought just not last and you didn t have money to get more?: No   Transportation Needs: Low Risk  (2024)    Transportation Needs      Within the past 12 months, has lack of transportation kept you from medical appointments, getting your medicines, non-medical meetings or appointments, work, or from getting things that you need?: No   Physical Activity: Not on file   Stress: Not on file   Social Connections: Not on file   Interpersonal Safety: Low Risk  (3/14/2025)    Interpersonal Safety      Do you feel physically and emotionally safe where you currently live?: Yes       Within the past 12 months, have you been hit, slapped, kicked or otherwise physically hurt by someone?: No      Within the past 12 months, have you been humiliated or emotionally abused in other ways by your partner or ex-partner?: No   Housing Stability: Low Risk  (8/8/2024)    Housing Stability      Do you have housing? : Yes      Are you worried about losing your housing?: No            Family History:   Has 33 yo daughter healthy Ethnic background English, Swedish and [place]  Family History   Problem Relation Age of Onset     Arthritis Mother      Thyroid Cancer Mother         Survivor!     Thyroid Disease Mother         Thyroid cancer     Diabetes Father      Substance Abuse Father      Hypertension Father      Cervical Cancer Maternal Grandmother      Skin Cancer Maternal Grandfather      Cerebrovascular Disease Maternal Grandfather      No Known Problems Paternal Grandmother      Prostate Cancer Paternal Grandfather      Colon Cancer No family hx of      Hyperlipidemia No family hx of      Coronary Artery Disease No family hx of      Breast Cancer No family hx of      Glaucoma No family hx of      Macular Degeneration No family hx of    No hx of iron overload no liver cancer in FH         Allergies:     Allergies   Allergen Reactions     Erythromycin GI Disturbance     Losartan Other (See Comments)     Consistently develops hyperkalemia     Vioxx      Nausea, vomiting            Medications:   (Not in a hospital admission)           Physical Exam:       General: Appears well,in no acute distress.Obese  Heme/Lymph: No overt bleeding. No cervical, axillary, or supraclavicular adenopathy.  Skin: No concerning lesions, rash, jaundice, cyanosis, erythema, or ecchymoses on exposed surfaces.  HEENT: NCAT. EOMI, anicteric sclera. Oral mucosa pink and moist with no lesions or thrush.  Respiratory: Non-labored breathing, good air exchange, lungs clear to auscultation bilaterally.  Cardiovascular: Regular rate and rhythm.  No murmur or rub.   Gastrointestinal: Normoactive bowel sounds. Abdomen soft, Scarf from live transplant below costal margin  bilaterally Spleen felt 4FGB BCM Liver felt 4FGB BCM  Extremities: No extremity edema.   Neurologic: A&O x 3, speech normal, sensation to light touch grossly WNL.Refelexed decreased at knees         Data:   I have personally reviewed the following labs/imaging:  CBC@LABRCNTIPR(wbc:4,rbc:4,hgb:4,hct:4,mcv:4,mch:4,mchc:4,rdw:4,plt:4)@  CMP@LABRCNTIPR(na:4,potassium:4,chloride:4,co2:4,aniongap:4,g,bun:4,cr:4,gfrestimated:4,gfrestblack:4,kit:4,ma,phos:4,prottotal:4,albumin:4,bilitotal:4,alkphos:4,ast:4,alt:4)@  INR@LABRCNTIPR(inr:4)@    Latest Reference Range & Units 25 07:30   Sodium 135 - 145 mmol/L 140   Potassium 3.4 - 5.3 mmol/L 4.8   Chloride 98 - 107 mmol/L 107   Carbon Dioxide (CO2) 22 - 29 mmol/L 19 (L)   Urea Nitrogen 8.0 - 23.0 mg/dL 28.6 (H)   Creatinine 0.67 - 1.17 mg/dL 2.02 (H)   GFR Estimate >60 mL/min/1.73m2 37 (L)   Calcium 8.8 - 10.4 mg/dL 9.0   Anion Gap 7 - 15 mmol/L 14   Magnesium 1.7 - 2.3 mg/dL 1.9   Albumin 3.5 - 5.2 g/dL 4.3   Protein Total 6.4 - 8.3 g/dL 6.3 (L)   Alkaline Phosphatase 40 - 150 U/L 165 (H)   ALT 0 - 70 U/L 21   AST 0 - 45 U/L 21   Bilirubin Direct 0.00 - 0.45 mg/dL 0.32   Bilirubin Total <=1.2 mg/dL 0.7   CRP Inflammation <5.00 mg/L 11.20 (H)   Erythropoietin 4 - 27 mU/mL 14   Ferritin 31 - 409 ng/mL 628 (H)   RBC Folate  Rpt !   Glucose 70 - 99 mg/dL 203 (H)   Iron 61 - 157 ug/dL 110   Iron Binding Capacity 240 - 430 ug/dL 211 (L)   Iron Sat Index 15 - 46 % 52 (H)   Lactate Dehydrogenase 0 - 250 U/L 178   TSH 0.30 - 4.20 uIU/mL 3.43   Uric Acid 3.4 - 7.0 mg/dL 8.6 (H)   Vitamin B12 232 - 1,245 pg/mL 299   WBC 4.0 - 11.0 10e3/uL 3.8 (L)   Hemoglobin 13.3 - 17.7 g/dL 9.5 (L)   Hematocrit 40.0 - 53.0 %  40.0 - 53.0 % 29.6 (L)  29.6 (L)   Platelet Count 150 - 450 10e3/uL 103 (L)   RBC Count 4.40 - 5.90 10e6/uL 3.19 (L)   MCV 78 - 100 fL  78 -  100 fL 93  93   MCH 26.5 - 33.0 pg 29.8   MCHC 31.5 - 36.5 g/dL 32.1   RDW 10.0 - 15.0 % 13.6   % Neutrophils % 73   % Lymphocytes % 17   % Monocytes % 7   % Eosinophils % 1   % Basophils % 1   % Immature Granulocytes % 1   NRBC/W <1 /100 0   Absolute Neutrophil 1.6 - 8.3 10e3/uL 2.8   Absolute Lymphocytes 0.8 - 5.3 10e3/uL 0.7 (L)   Absolute Monocytes 0.0 - 1.3 10e3/uL 0.3   Absolute Eosinophils 0.0 - 0.7 10e3/uL 0.1   Absolute Basophils 0.0 - 0.2 10e3/uL 0.0   Absolute Immature Granulocytes <=0.4 10e3/uL 0.0   Absolute NRBCs 10e3/uL 0.0   % Retic 0.5 - 2.0 % 2.4 (H)   Absolute Retic 0.025 - 0.095 10e6/uL 0.076   Sed Rate 0 - 20 mm/hr 23 (H)   SPECIMEN EXPIRATION DATE  7/17/2025 11:59:00 PM CDT   DERRELL Anti-IgG,-C3d  Negative   Haptoglobin 89   (L): Data is abnormally low  (H): Data is abnormally high  !: Data is abnormal  Rpt: View report in Results Review for more information  Peripheral Blood Smear:  -Moderate normochromic, normocytic anemia without increased red blood cell regeneration and no morphologic evidence of hemolysis  -Slight leukopenia with lymphopenia  -Slight thrombocytopenia with normal platelet morphology  -See comment    Again, thank you for allowing me to participate in the care of your patient.        Sincerely,        Lyle Srivastava MD    Electronically signed

## 2025-07-16 NOTE — PROGRESS NOTES
"[facility]: Cancer Care Initial Note                                    Discussion with Patient:                                                      Met with pt after his visit with Dr. Andrea Srivastava today, 7/16/25.    Assessment:                                                      Initial  Current living arrangement:: I live in a private home  Type of residence:: Private home - stairs (About 6 steps into home.  2 story home with basement.)  Informal Support system:: Children  Equipment Currently Used at Home: none  Bed or wheelchair confined:: No  Mobility Status: Independent  Transportation means:: Regular car, Family      Intervention/Education provided during outreach:                                                       RN introduced self and role of RN Care Coordinator at [facility]. Provided [facility] phone number which has options to speak with clinic coordinator (), a triage nurse about symptom questions and reach out to RNCC during business hours.  Also stated phone number rolls to a more general nurse line so there is a nurse available 24/7.   Discussed use of MyChart including to not use it for symptoms as well as response time expectations.  Discussed authorization to discuss PHI (consent to communicate) form with pt.  He has 2 forms with the \"most recent\" not having his daughter listed as is listed on the \"previous\".  Pt decided to take the form and complete later.  He stated he can even put in their clinic scanning.  RN put visit label on form for pt.  Completed learning assessment with patient.  Briefly discussed the hemochromatosis mutation lab test that Dr. Andrea Srivastava ordered.  RN will follow up with pt if needs to be drawn in the near future (rather than waiting until his return visit in 3 months).     Signature:  Ally Cross RN, OCN        "

## 2025-07-17 ENCOUNTER — PATIENT OUTREACH (OUTPATIENT)
Dept: CARE COORDINATION | Facility: CLINIC | Age: 60
End: 2025-07-17
Payer: COMMERCIAL

## 2025-07-17 DIAGNOSIS — N18.32 ANEMIA OF CHRONIC RENAL FAILURE, STAGE 3B (H): Primary | ICD-10-CM

## 2025-07-17 DIAGNOSIS — D63.1 ANEMIA OF CHRONIC RENAL FAILURE, STAGE 3B (H): Primary | ICD-10-CM

## 2025-07-17 RX ORDER — DIPHENHYDRAMINE HYDROCHLORIDE 50 MG/ML
50 INJECTION, SOLUTION INTRAMUSCULAR; INTRAVENOUS
Start: 2025-07-17

## 2025-07-17 RX ORDER — ALBUTEROL SULFATE 0.83 MG/ML
2.5 SOLUTION RESPIRATORY (INHALATION)
OUTPATIENT
Start: 2025-07-17

## 2025-07-17 RX ORDER — MEPERIDINE HYDROCHLORIDE 25 MG/ML
25 INJECTION INTRAMUSCULAR; INTRAVENOUS; SUBCUTANEOUS
OUTPATIENT
Start: 2025-07-17

## 2025-07-17 RX ORDER — ALBUTEROL SULFATE 90 UG/1
1-2 INHALANT RESPIRATORY (INHALATION)
Start: 2025-07-17

## 2025-07-17 RX ORDER — METHYLPREDNISOLONE SODIUM SUCCINATE 40 MG/ML
40 INJECTION INTRAMUSCULAR; INTRAVENOUS
Start: 2025-07-17

## 2025-07-17 RX ORDER — DIPHENHYDRAMINE HYDROCHLORIDE 50 MG/ML
25 INJECTION, SOLUTION INTRAMUSCULAR; INTRAVENOUS
Start: 2025-07-17

## 2025-07-17 RX ORDER — EPINEPHRINE 1 MG/ML
0.3 INJECTION, SOLUTION, CONCENTRATE INTRAVENOUS EVERY 5 MIN PRN
OUTPATIENT
Start: 2025-07-17

## 2025-07-17 NOTE — PROGRESS NOTES
Anemia Management Note - Enrollment    SUBJECTIVE/OBJECTIVE:    Referred by Dr. Andrea Srivastava on 2025  Primary Diagnosis: Anemia in Chronic Kidney Disease (N18.3, D63.1)   3b  Secondary Diagnosis: Chronic Kidney Disease, Stage 3 (N18.3)  3b  Date of Liver Transplant: 3/14/2017  Hgb goal range: >10.0    Epo/Darbo: Aranesp 100 mcg  every two weeks for Hgb <10.0,   In clinic *per Dr. Srivastava.  Iron regimen: NA    Labs : 2026  RX/TX plans : 2026    *Jazzy.  (Works at the [employer] [facility] [employer])    Recent DIPAK use, transfusion, IV iron: Aranesp   Hx of [facility]    Contact: Consent to Communicate Scanned on 5/3/2017.         Latest Ref Rng & Units 2024   Anemia   Hemoglobin 13.3 - 17.7 g/dL 10.8  11.4  10.7  10.7  9.7  9.2  9.5    TSAT 15 - 46 %    37    52    Ferritin 31 - 409 ng/mL    604    628      BP Readings from Last 3 Encounters:   25 130/70   25 (!) 149/73   25 133/70     Wt Readings from Last 2 Encounters:   25 113.3 kg (249 lb 11.2 oz)   25 117.5 kg (259 lb)     Current Outpatient Medications   Medication Sig Dispense Refill    alcohol swab prep pads Use to swab area of injection/francisca as directed. 100 each 3    alpha-lipoic acid 600 MG capsule Take 600 mg by mouth      amLODIPine (NORVASC) 10 MG tablet Take 1 tablet (10 mg) by mouth daily. 90 tablet 3    augmented betamethasone dipropionate (DIPROLENE-AF) 0.05 % external ointment Apply twice daily as needed for rash on the legs 45 g 11    cholecalciferol (VITAMIN D3) 1000 UNIT tablet Take 1 tablet (1,000 Units) by mouth daily 100 tablet 3    Continuous Glucose  (DEXCOM G7 ) TACOS Use to read blood sugars as per 's instructions. 1 each 0    Continuous Glucose Sensor (DEXCOM G7 SENSOR) MISC Change every 10 days. 9 each 3    cyanocobalamin (VITAMIN B-12) 1000 MCG tablet Take 1 tablet (1,000 mcg) by mouth daily. 60 tablet 3     cyclobenzaprine (FLEXERIL) 10 MG tablet Take 1 tablet (10 mg) by mouth 3 times daily as needed for muscle spasms 90 tablet 3    diclofenac (VOLTAREN) 0.1 % ophthalmic solution Place 1 drop Into the left eye 4 times daily. 5 mL 4    doxazosin (CARDURA) 8 MG tablet Take 1 tablet (8 mg) by mouth at bedtime. Take a total of 10 mg at bedtime 90 tablet 3    empagliflozin (JARDIANCE) 10 MG TABS tablet Take 1 tablet (10 mg) by mouth daily. 90 tablet 1    fluticasone (FLONASE) 50 MCG/ACT nasal spray Spray 1 spray into both nostrils daily 20 mL 3    furosemide (LASIX) 40 MG tablet Take 40 mg daily and OK to take an extra for LE edema. 180 tablet 3    gabapentin (NEURONTIN) 800 MG tablet Take 1 tablet (800 mg) by mouth 3 times daily. 90 tablet 1    hydrALAZINE (APRESOLINE) 50 MG tablet Take 1 tablet (50 mg) by mouth 2 times daily. 180 tablet 3    hydrOXYzine (ATARAX) 25 MG tablet Take 1 tablet (25 mg) by mouth 3 times daily as needed for itching 90 tablet 3    Insulin Disposable Pump (OMNIPOD 5 G6 POD, GEN 5,) MISC 1 each See Admin Instructions Use per 's instructions. 1 each 1    Insulin Disposable Pump (OMNIPOD 5 PODS, GEN 5,) MISC 1 each See Admin Instructions. Change every 2 days. Use for insulin administration. 45 each 3    Insulin Lispro-aabc (LYUMJEV KWIKPEN) 200 UNIT/ML SOPN 1 Units by Other route See Admin Instructions. Via insulin pump. Approx 170 units daily plus 2-3 units daily to prime pump and tubing. (Patient taking differently: 1 Units by Other route once a week. Via insulin pump. Approx 170 units daily plus 2-3 units daily to prime pump and tubing.) 170 mL 3    insulin pen needle (BD ENE U/F) 32G X 4 MM miscellaneous Use 1 pen needle 4 times daily or as directed. 400 each 1    ketorolac (ACULAR) 0.5 % ophthalmic solution Place 1 drop Into the left eye 4 times daily. Start 2 days prior to surgery four times a day, after surgery: Four times a day x 1 week, three times a day x 1 week, twice a day x 1  week, daily x 1 week then stop 10 mL 0    lidocaine (LIDODERM) 5 % patch Place 1 patch onto the skin every 24 hours To prevent lidocaine toxicity, patient should be patch free for 12 hrs daily. 30 patch 11    lidocaine (XYLOCAINE) 5 % external ointment Apply topically as needed for moderate pain 90 g 11    losartan (COZAAR) 25 MG tablet Take 1 tablet (25 mg) by mouth daily. 90 tablet 3    methocarbamol (ROBAXIN) 500 MG tablet Take 1 tablet (500 mg) by mouth 2 times daily as needed for muscle spasms. 60 tablet 11    metoprolol succinate ER (TOPROL XL) 200 MG 24 hr tablet Take 1 tablet (200 mg) by mouth daily. 90 tablet 1    moxifloxacin (VIGAMOX) 0.5 % ophthalmic solution Place 1 drop Into the left eye 4 times daily. Start 2 days prior to surgery four times a day. After Surgery: four times a day  X 1 week 3 mL 0    mycophenolic acid (GENERIC EQUIVALENT) 360 MG EC tablet Take 2 tablets (720 mg) by mouth every 12 hours. 360 tablet 3    oxyCODONE (ROXICODONE) 5 MG tablet Take 1 tablet (5 mg) by mouth 4 times daily as needed for pain. maximum 6 tablet(s) per day 30 tablet 0    prednisoLONE acetate (PRED FORTE) 1 % ophthalmic suspension Place 1-2 drops Into the left eye 4 times daily. After surgery: Four times a day x 1 week, three times a day x 1 week, twice a day x 1 week, daily x 1 week then stop 10 mL 0    predniSONE (DELTASONE) 2.5 MG tablet Take 1 tablet (2.5 mg) by mouth daily. 90 tablet 3    PROGRAF (BRAND) 1 MG capsule       tacrolimus (GENERIC EQUIVALENT) 1 MG capsule Take 4 capsules (4 mg) by mouth every morning AND 3 capsules (3 mg) every evening. 630 capsule 3    Tacrolimus 1 MG PACK       tirzepatide (MOUNJARO) 10 MG/0.5ML SOAJ auto-injector pen Inject 0.5 mLs (10 mg) subcutaneously once a week. 2 mL 2    tretinoin (RETIN-A) 0.025 % external cream Apply a pea-sized amount evenly over the face at nighttime before bed 45 g 11    triamcinolone (KENALOG) 0.1 % external ointment Apply topically 2 times daily to  the itching on the legs 80 g 1    ursodiol (ACTIGALL) 500 MG tablet Take 1 tablet (500 mg) by mouth 2 times daily. 180 tablet 3       ASSESSMENT:  Hgb At goal   Ferritin: At goal (>100ng/mL)  TSat: elevated at >50%  Iron regimen recommended: NA  Recommended DIPAK regimen per Dr. Srivastava; Aranesp 100mcg every 2 weeks.   Blood Pressure: Stable     Goals Addressed                      This Visit's Progress      Medical-Anemia (pt-stated)         Goal Statement: I will actively work with my care teams to manage my anemia.  Date Goal set: 7/17/2025  Barriers: diagnoses  Strengths: support, coping, motivation, health awareness, and involvement with care team  Date to Achieve By: ongoing  Patient expressed understanding of goal: Yes   Action steps to achieve this goal:  I will receive my anemia injection(s)/infusion(s) as prescribed.  I will complete lab checks as recommended by my care team.  I will reach out to my nurse care coordinator with any follow-up questions.                PLAN:  1. Patient called today for enrollment in Anemia Management Service.  2. Discussed by Dr. Srivastava: anemia overview, monitoring service, and goal hemoglobin range and rationale and risks of DIPAK blood clots, stroke, and increase in blood pressure  3. Dose location: in clinic [facility]  4. Labs: [place]  5. Pharmacy: [facility]      Patient verbalized understanding of the plan.  Camacho will call the infusion center to schedule the Aranesp.     Next call date:  7/23/25    Angelique Calabrese RN   Anemia Services  [facility]  [email or web address]   Office : [phone]  Fax: [phone]

## 2025-07-24 DIAGNOSIS — Z79.4 TYPE 2 DIABETES MELLITUS WITH BOTH EYES AFFECTED BY MILD NONPROLIFERATIVE RETINOPATHY AND MACULAR EDEMA, WITH LONG-TERM CURRENT USE OF INSULIN (H): Primary | ICD-10-CM

## 2025-07-24 DIAGNOSIS — E11.3213 TYPE 2 DIABETES MELLITUS WITH BOTH EYES AFFECTED BY MILD NONPROLIFERATIVE RETINOPATHY AND MACULAR EDEMA, WITH LONG-TERM CURRENT USE OF INSULIN (H): Primary | ICD-10-CM

## 2025-07-30 ENCOUNTER — INFUSION THERAPY VISIT (OUTPATIENT)
Dept: ONCOLOGY | Facility: CLINIC | Age: 60
End: 2025-07-30
Attending: INTERNAL MEDICINE
Payer: COMMERCIAL

## 2025-07-30 ENCOUNTER — PATIENT OUTREACH (OUTPATIENT)
Dept: CARE COORDINATION | Facility: CLINIC | Age: 60
End: 2025-07-30

## 2025-07-30 VITALS
DIASTOLIC BLOOD PRESSURE: 73 MMHG | OXYGEN SATURATION: 99 % | SYSTOLIC BLOOD PRESSURE: 151 MMHG | RESPIRATION RATE: 16 BRPM | TEMPERATURE: 98.9 F | HEART RATE: 76 BPM

## 2025-07-30 DIAGNOSIS — N18.32 ANEMIA OF CHRONIC RENAL FAILURE, STAGE 3B (H): Primary | ICD-10-CM

## 2025-07-30 DIAGNOSIS — I10 ESSENTIAL HYPERTENSION: ICD-10-CM

## 2025-07-30 DIAGNOSIS — D63.1 ANEMIA OF CHRONIC RENAL FAILURE, STAGE 3B (H): Primary | ICD-10-CM

## 2025-07-30 LAB
FERRITIN SERPL-MCNC: 541 NG/ML (ref 31–409)
HCT VFR BLD AUTO: 29.3 % (ref 40–53)
HGB BLD-MCNC: 9.5 G/DL (ref 13.3–17.7)
IRON BINDING CAPACITY (ROCHE): 214 UG/DL (ref 240–430)
IRON SATN MFR SERPL: 39 % (ref 15–46)
IRON SERPL-MCNC: 84 UG/DL (ref 61–157)
MCV RBC AUTO: 92 FL (ref 78–100)

## 2025-07-30 PROCEDURE — 36415 COLL VENOUS BLD VENIPUNCTURE: CPT

## 2025-07-30 PROCEDURE — 250N000011 HC RX IP 250 OP 636: Mod: JZ | Performed by: INTERNAL MEDICINE

## 2025-07-30 PROCEDURE — 96372 THER/PROPH/DIAG INJ SC/IM: CPT | Performed by: INTERNAL MEDICINE

## 2025-07-30 PROCEDURE — 83550 IRON BINDING TEST: CPT

## 2025-07-30 PROCEDURE — 82728 ASSAY OF FERRITIN: CPT

## 2025-07-30 PROCEDURE — 85014 HEMATOCRIT: CPT

## 2025-07-30 RX ORDER — DIPHENHYDRAMINE HYDROCHLORIDE 50 MG/ML
25 INJECTION, SOLUTION INTRAMUSCULAR; INTRAVENOUS
Start: 2025-08-13

## 2025-07-30 RX ORDER — ALBUTEROL SULFATE 0.83 MG/ML
2.5 SOLUTION RESPIRATORY (INHALATION)
OUTPATIENT
Start: 2025-08-13

## 2025-07-30 RX ORDER — METHYLPREDNISOLONE SODIUM SUCCINATE 40 MG/ML
40 INJECTION INTRAMUSCULAR; INTRAVENOUS
Start: 2025-08-13

## 2025-07-30 RX ORDER — MEPERIDINE HYDROCHLORIDE 25 MG/ML
25 INJECTION INTRAMUSCULAR; INTRAVENOUS; SUBCUTANEOUS
OUTPATIENT
Start: 2025-08-13

## 2025-07-30 RX ORDER — ALBUTEROL SULFATE 90 UG/1
1-2 INHALANT RESPIRATORY (INHALATION)
Start: 2025-08-13

## 2025-07-30 RX ORDER — EPINEPHRINE 1 MG/ML
0.3 INJECTION, SOLUTION, CONCENTRATE INTRAVENOUS EVERY 5 MIN PRN
OUTPATIENT
Start: 2025-08-13

## 2025-07-30 RX ORDER — DIPHENHYDRAMINE HYDROCHLORIDE 50 MG/ML
50 INJECTION, SOLUTION INTRAMUSCULAR; INTRAVENOUS
Start: 2025-08-13

## 2025-07-30 RX ADMIN — DARBEPOETIN ALFA 100 MCG: 100 INJECTION, SOLUTION INTRAVENOUS; SUBCUTANEOUS at 08:33

## 2025-07-30 ASSESSMENT — PAIN SCALES - GENERAL: PAINLEVEL_OUTOF10: SEVERE PAIN (7)

## 2025-07-30 NOTE — NURSING NOTE
Chief Complaint   Patient presents with    Blood Draw     Blood draw via  by RN       Labs collected from venipuncture by RN. Vitals taken. Checked in for appointment(s).     Ngoc Almaraz RN

## 2025-07-30 NOTE — PROGRESS NOTES
Infusion Injection Note:  Camacho Bhagat presents today for Aranesp.    Patient seen by provider today: No   present during visit today: Not Applicable.    Patient declined to speak with an RN today.       Treatment Conditions:  Results reviewed, labs MET treatment parameters, ok to proceed with treatment.    Pre and Post Injection:  Aranesp injection given to Left Arm without incident.   Patient tolerated procedure well..    Discharge Plan:  Patient discharged in stable condition accompanied by: self

## 2025-07-30 NOTE — PROGRESS NOTES
Anemia Management Note - Follow Up      SUBJECTIVE/OBJECTIVE:    Referred by Dr. Andrea Srivastava on 2025  Primary Diagnosis: Anemia in Chronic Kidney Disease (N18.3, D63.1)   3b  Secondary Diagnosis: Chronic Kidney Disease, Stage 3 (N18.3)  3b  Date of Liver Transplant: 3/14/2017  Hgb goal range: >10.0     Epo/Darbo: Aranesp 100 mcg  every two weeks for Hgb <10.0,   In clinic *per Dr. Srivastava.  Iron regimen: NA     Labs : 2026  RX/TX plans : 2026     *Jazzy.  (Works at the [employer] [facility] [place])     Recent DIPAK use, transfusion, IV iron: Aranesp   Hx of Psychiatric     Contact: Consent to Communicate Scanned on 5/3/2017.         Latest Ref Rng & Units 2024   Anemia   HGB Goal        9 - 10   DIPAK Dose        100 mcg   Hemoglobin 13.3 - 17.7 g/dL 11.4  10.7  10.7  9.7  9.2  9.5  9.5    TSAT 15 - 46 %   37    52  39    Ferritin 31 - 409 ng/mL   604    628  541      BP Readings from Last 3 Encounters:   25 (!) 151/73   25 130/70   25 (!) 149/73     Wt Readings from Last 2 Encounters:   25 113.3 kg (249 lb 11.2 oz)   25 117.5 kg (259 lb)           ASSESSMENT:    Hgb:at goal - received dose in clinic - recommend continue current regimen  TSat: at goal >30% Ferritin: At goal (>100ng/mL)     Goals Addressed                      This Visit's Progress      Medical-Anemia (pt-stated)   On track      Goal Statement: I will actively work with my care teams to manage my anemia.  Date Goal set: 2025  Barriers: diagnoses  Strengths: support, coping, motivation, health awareness, and involvement with care team  Date to Achieve By: ongoing  Patient expressed understanding of goal: Yes   Action steps to achieve this goal:  I will receive my anemia injection(s)/infusion(s) as prescribed.  I will complete lab checks as recommended by my care team.  I will reach out to my nurse care coordinator with any follow-up questions.                 PLAN:  Dose with Aranesp.  RTC for hgb then Aranesp if needed in 2 week(s). Aranesp is scheduled for 8/13 & 8/27.     Orders needed to be renewed (for next follow-up date) in [facility]: None    Iron labs due:  End of Oct 2025    Plan discussed with:  No call, chart review       NEXT FOLLOW-UP DATE:  8/27/25    Angelique Calabrese RN   Anemia Services  [facility]  [email or web address]   Office : [phone]  Fax: [phone]

## 2025-07-30 NOTE — PATIENT INSTRUCTIONS
Contact Numbers    [facility] Main Line (for Scheduling/Triage/After Hours Nurse Line): [phone]    Please call the [facility] nurse triage or the after hours nurse line if you experience a temperature greater than or equal to 100.4, shaking chills, have uncontrolled nausea, vomiting and/or diarrhea, dizziness, lightheadedness, shortness of breath, chest pain, bleeding, unexplained bruising, or if you have any other new/concerning symptoms, questions or concerns.     If you are having any concerning symptoms or wish to speak to a provider before your next infusion visit, please call your care coordinator or triage to notify them so we can adequately serve you.     If you need any refills on medications (narcotics or other medications), please call before your infusion appointment.

## 2025-07-30 NOTE — PROGRESS NOTES
Nursing Note:    Camacho Bhagat presents today for Aranesp.   Patient seen by provider today: No   present during visit today: Not Applicable.     Note: Patient is new to the infusion room today and is receiving Aranesp for the first time. Patient oriented to infusion room, location of bathrooms and nutrition stations, and call light. Verified that patient recieved written chemotherapy information previously. Verbally reviewed chemotherapy teaching, side effects, take-home medications, and follow-up schedule with patient. Patient instructed to call triage with any questions or if he experiences a temperature >100.4, shaking chills, uncontrolled nausea/vomiting/diarrhea, dizziness, shortness of breath, bleeding not relieved with pressure, or with any other concerns.      Treatment Conditions:  Lab Results   Component Value Date    HGB 9.5 (L) 07/30/2025    WBC 3.8 (L) 07/14/2025    ANEU 2.8 07/14/2025     (L) 07/14/2025      I released the medication and delegated administration to the supportive staff team member. Please see MAR and administration note for additional details.     Mariia Srivastava, RN

## 2025-07-31 RX ORDER — METOPROLOL SUCCINATE 200 MG/1
200 TABLET, EXTENDED RELEASE ORAL DAILY
Qty: 90 TABLET | Refills: 0 | Status: SHIPPED | OUTPATIENT
Start: 2025-07-31

## 2025-08-04 ENCOUNTER — OFFICE VISIT (OUTPATIENT)
Dept: ENDOCRINOLOGY | Facility: CLINIC | Age: 60
End: 2025-08-04
Payer: COMMERCIAL

## 2025-08-04 ENCOUNTER — OFFICE VISIT (OUTPATIENT)
Dept: OPHTHALMOLOGY | Facility: CLINIC | Age: 60
End: 2025-08-04
Payer: COMMERCIAL

## 2025-08-04 VITALS
DIASTOLIC BLOOD PRESSURE: 70 MMHG | WEIGHT: 244 LBS | HEART RATE: 82 BPM | OXYGEN SATURATION: 98 % | SYSTOLIC BLOOD PRESSURE: 141 MMHG | BODY MASS INDEX: 33.08 KG/M2

## 2025-08-04 DIAGNOSIS — E11.42 TYPE 2 DIABETES MELLITUS WITH DIABETIC POLYNEUROPATHY, WITH LONG-TERM CURRENT USE OF INSULIN (H): Primary | ICD-10-CM

## 2025-08-04 DIAGNOSIS — N18.31 CHRONIC KIDNEY DISEASE, STAGE 3A (H): ICD-10-CM

## 2025-08-04 DIAGNOSIS — E11.21 TYPE 2 DIABETES MELLITUS WITH DIABETIC NEPHROPATHY, WITH LONG-TERM CURRENT USE OF INSULIN (H): ICD-10-CM

## 2025-08-04 DIAGNOSIS — E11.3213 TYPE 2 DIABETES MELLITUS WITH BOTH EYES AFFECTED BY MILD NONPROLIFERATIVE RETINOPATHY AND MACULAR EDEMA, WITH LONG-TERM CURRENT USE OF INSULIN (H): ICD-10-CM

## 2025-08-04 DIAGNOSIS — Z79.4 TYPE 2 DIABETES MELLITUS WITH DIABETIC POLYNEUROPATHY, WITH LONG-TERM CURRENT USE OF INSULIN (H): Primary | ICD-10-CM

## 2025-08-04 DIAGNOSIS — H35.81 DIABETIC MACULAR EDEMA, LEFT EYE (H): Primary | ICD-10-CM

## 2025-08-04 DIAGNOSIS — E11.311 DIABETIC MACULAR EDEMA, LEFT EYE (H): Primary | ICD-10-CM

## 2025-08-04 DIAGNOSIS — I10 HYPERTENSION, UNSPECIFIED TYPE: ICD-10-CM

## 2025-08-04 DIAGNOSIS — E66.813 CLASS 3 SEVERE OBESITY WITH SERIOUS COMORBIDITY AND BODY MASS INDEX (BMI) OF 40.0 TO 44.9 IN ADULT, UNSPECIFIED OBESITY TYPE (H): ICD-10-CM

## 2025-08-04 DIAGNOSIS — Z79.4 TYPE 2 DIABETES MELLITUS WITH BOTH EYES AFFECTED BY MILD NONPROLIFERATIVE RETINOPATHY AND MACULAR EDEMA, WITH LONG-TERM CURRENT USE OF INSULIN (H): ICD-10-CM

## 2025-08-04 DIAGNOSIS — Z79.4 TYPE 2 DIABETES MELLITUS WITH DIABETIC NEPHROPATHY, WITH LONG-TERM CURRENT USE OF INSULIN (H): ICD-10-CM

## 2025-08-04 LAB
EST. AVERAGE GLUCOSE BLD GHB EST-MCNC: 105 MG/DL
HBA1C MFR BLD: 5.3 %

## 2025-08-04 PROCEDURE — 83036 HEMOGLOBIN GLYCOSYLATED A1C: CPT | Performed by: PATHOLOGY

## 2025-08-04 PROCEDURE — 92134 CPTRZ OPH DX IMG PST SGM RTA: CPT

## 2025-08-04 PROCEDURE — 3078F DIAST BP <80 MM HG: CPT | Performed by: INTERNAL MEDICINE

## 2025-08-04 PROCEDURE — 1125F AMNT PAIN NOTED PAIN PRSNT: CPT | Performed by: INTERNAL MEDICINE

## 2025-08-04 PROCEDURE — 99214 OFFICE O/P EST MOD 30 MIN: CPT

## 2025-08-04 PROCEDURE — 2022F DILAT RTA XM EVC RTNOPTHY: CPT

## 2025-08-04 PROCEDURE — 3077F SYST BP >= 140 MM HG: CPT | Performed by: INTERNAL MEDICINE

## 2025-08-04 PROCEDURE — 95251 CONT GLUC MNTR ANALYSIS I&R: CPT | Performed by: INTERNAL MEDICINE

## 2025-08-04 PROCEDURE — 99215 OFFICE O/P EST HI 40 MIN: CPT | Mod: 25 | Performed by: INTERNAL MEDICINE

## 2025-08-04 PROCEDURE — 99213 OFFICE O/P EST LOW 20 MIN: CPT

## 2025-08-04 RX ORDER — ACYCLOVIR 400 MG/1
TABLET ORAL
Qty: 9 EACH | Refills: 3 | Status: SHIPPED | OUTPATIENT
Start: 2025-08-04

## 2025-08-04 RX ORDER — INSULIN LISPRO-AABC 200 [IU]/ML
1 INJECTION, SOLUTION SUBCUTANEOUS SEE ADMIN INSTRUCTIONS
Qty: 120 ML | Refills: 3 | Status: SHIPPED | OUTPATIENT
Start: 2025-08-04

## 2025-08-04 RX ORDER — INSULIN PMP CART,AUT,G6/7,CNTR
1 EACH SUBCUTANEOUS SEE ADMIN INSTRUCTIONS
Qty: 45 EACH | Refills: 3 | Status: SHIPPED | OUTPATIENT
Start: 2025-08-04

## 2025-08-04 ASSESSMENT — TONOMETRY
OD_IOP_MMHG: 17
OS_IOP_MMHG: 17
IOP_METHOD: TONOPEN

## 2025-08-04 ASSESSMENT — PAIN SCALES - GENERAL: PAINLEVEL_OUTOF10: SEVERE PAIN (7)

## 2025-08-04 ASSESSMENT — VISUAL ACUITY
OS_SC+: -2
OS_SC: 20/30
METHOD: SNELLEN - LINEAR
OD_SC+: -1
OD_SC: 20/20

## 2025-08-04 ASSESSMENT — CONF VISUAL FIELD
OS_SUPERIOR_NASAL_RESTRICTION: 0
OD_SUPERIOR_NASAL_RESTRICTION: 0
OS_INFERIOR_NASAL_RESTRICTION: 0
METHOD: COUNTING FINGERS
OD_SUPERIOR_TEMPORAL_RESTRICTION: 0
OD_INFERIOR_TEMPORAL_RESTRICTION: 0
OS_NORMAL: 1
OS_SUPERIOR_TEMPORAL_RESTRICTION: 0
OD_NORMAL: 1
OD_INFERIOR_NASAL_RESTRICTION: 0
OS_INFERIOR_TEMPORAL_RESTRICTION: 0

## 2025-08-04 ASSESSMENT — EXTERNAL EXAM - LEFT EYE: OS_EXAM: NORMAL

## 2025-08-04 ASSESSMENT — CUP TO DISC RATIO: OS_RATIO: 0.1

## 2025-08-04 ASSESSMENT — SLIT LAMP EXAM - LIDS
COMMENTS: NORMAL
COMMENTS: NORMAL

## 2025-08-04 ASSESSMENT — EXTERNAL EXAM - RIGHT EYE: OD_EXAM: NORMAL

## 2025-08-12 ENCOUNTER — OFFICE VISIT (OUTPATIENT)
Dept: ORTHOPEDICS | Facility: CLINIC | Age: 60
End: 2025-08-12
Payer: COMMERCIAL

## 2025-08-12 DIAGNOSIS — E11.49 TYPE II OR UNSPECIFIED TYPE DIABETES MELLITUS WITH NEUROLOGICAL MANIFESTATIONS, NOT STATED AS UNCONTROLLED(250.60) (H): ICD-10-CM

## 2025-08-12 DIAGNOSIS — L60.2 LONG TOENAIL: ICD-10-CM

## 2025-08-12 DIAGNOSIS — R25.2 LEG CRAMPING: Primary | ICD-10-CM

## 2025-08-12 PROCEDURE — 99213 OFFICE O/P EST LOW 20 MIN: CPT | Mod: 25 | Performed by: PODIATRIST

## 2025-08-12 PROCEDURE — 11719 TRIM NAIL(S) ANY NUMBER: CPT | Performed by: PODIATRIST

## 2025-08-12 RX ORDER — MEPERIDINE HYDROCHLORIDE 25 MG/ML
25 INJECTION INTRAMUSCULAR; INTRAVENOUS; SUBCUTANEOUS
OUTPATIENT
Start: 2025-08-13

## 2025-08-12 RX ORDER — DIPHENHYDRAMINE HYDROCHLORIDE 50 MG/ML
50 INJECTION, SOLUTION INTRAMUSCULAR; INTRAVENOUS
Start: 2025-08-13

## 2025-08-12 RX ORDER — ALBUTEROL SULFATE 0.83 MG/ML
2.5 SOLUTION RESPIRATORY (INHALATION)
OUTPATIENT
Start: 2025-08-13

## 2025-08-12 RX ORDER — METHYLPREDNISOLONE SODIUM SUCCINATE 40 MG/ML
40 INJECTION INTRAMUSCULAR; INTRAVENOUS
Start: 2025-08-13

## 2025-08-12 RX ORDER — DIPHENHYDRAMINE HYDROCHLORIDE 50 MG/ML
25 INJECTION, SOLUTION INTRAMUSCULAR; INTRAVENOUS
Start: 2025-08-13

## 2025-08-12 RX ORDER — EPINEPHRINE 1 MG/ML
0.3 INJECTION, SOLUTION INTRAMUSCULAR; SUBCUTANEOUS EVERY 5 MIN PRN
OUTPATIENT
Start: 2025-08-13

## 2025-08-12 RX ORDER — ALBUTEROL SULFATE 90 UG/1
1-2 INHALANT RESPIRATORY (INHALATION)
Start: 2025-08-13

## 2025-08-13 ENCOUNTER — INFUSION THERAPY VISIT (OUTPATIENT)
Dept: ONCOLOGY | Facility: CLINIC | Age: 60
End: 2025-08-13
Attending: INTERNAL MEDICINE
Payer: COMMERCIAL

## 2025-08-13 VITALS
WEIGHT: 246.6 LBS | TEMPERATURE: 97.9 F | DIASTOLIC BLOOD PRESSURE: 72 MMHG | RESPIRATION RATE: 16 BRPM | BODY MASS INDEX: 33.44 KG/M2 | SYSTOLIC BLOOD PRESSURE: 115 MMHG | OXYGEN SATURATION: 99 % | HEART RATE: 77 BPM

## 2025-08-13 DIAGNOSIS — D63.1 ANEMIA OF CHRONIC RENAL FAILURE, STAGE 3B (H): Primary | ICD-10-CM

## 2025-08-13 DIAGNOSIS — E87.5 HYPERKALEMIA: ICD-10-CM

## 2025-08-13 DIAGNOSIS — R79.89 ELEVATED FERRITIN LEVEL: ICD-10-CM

## 2025-08-13 DIAGNOSIS — D64.9 ANEMIA: ICD-10-CM

## 2025-08-13 DIAGNOSIS — Z94.4 LIVER REPLACED BY TRANSPLANT (H): ICD-10-CM

## 2025-08-13 DIAGNOSIS — N18.32 CHRONIC KIDNEY DISEASE (CKD) STAGE G3B/A1, MODERATELY DECREASED GLOMERULAR FILTRATION RATE (GFR) BETWEEN 30-44 ML/MIN/1.73 SQUARE METER AND ALBUMINURIA CREATININE RATIO LESS THAN 30 MG/G (H): ICD-10-CM

## 2025-08-13 DIAGNOSIS — N18.31 STAGE 3A CHRONIC KIDNEY DISEASE (H): ICD-10-CM

## 2025-08-13 DIAGNOSIS — N18.32 ANEMIA OF CHRONIC RENAL FAILURE, STAGE 3B (H): Primary | ICD-10-CM

## 2025-08-13 DIAGNOSIS — Z79.60 LONG-TERM USE OF IMMUNOSUPPRESSANT MEDICATION: ICD-10-CM

## 2025-08-13 LAB
ALBUMIN SERPL BCG-MCNC: 4.1 G/DL (ref 3.5–5.2)
ANION GAP SERPL CALCULATED.3IONS-SCNC: 13 MMOL/L (ref 7–15)
BUN SERPL-MCNC: 45.6 MG/DL (ref 8–23)
CALCIUM SERPL-MCNC: 9 MG/DL (ref 8.8–10.4)
CHLORIDE SERPL-SCNC: 106 MMOL/L (ref 98–107)
CREAT SERPL-MCNC: 2.1 MG/DL (ref 0.67–1.17)
EGFRCR SERPLBLD CKD-EPI 2021: 35 ML/MIN/1.73M2
ERYTHROCYTE [DISTWIDTH] IN BLOOD BY AUTOMATED COUNT: 14.6 % (ref 10–15)
GLUCOSE SERPL-MCNC: 70 MG/DL (ref 70–99)
HCO3 SERPL-SCNC: 19 MMOL/L (ref 22–29)
HCT VFR BLD AUTO: 32.1 % (ref 40–53)
HGB BLD-MCNC: 10.6 G/DL (ref 13.3–17.7)
MCH RBC QN AUTO: 30.5 PG (ref 26.5–33)
MCHC RBC AUTO-ENTMCNC: 33 G/DL (ref 31.5–36.5)
MCV RBC AUTO: 92.5 FL (ref 78–100)
PHOSPHATE SERPL-MCNC: 3.5 MG/DL (ref 2.5–4.5)
PLATELET # BLD AUTO: 107 10E3/UL (ref 150–450)
POTASSIUM SERPL-SCNC: 5.2 MMOL/L (ref 3.4–5.3)
RBC # BLD AUTO: 3.47 10E6/UL (ref 4.4–5.9)
SODIUM SERPL-SCNC: 138 MMOL/L (ref 135–145)
WBC # BLD AUTO: 4.54 10E3/UL (ref 4–11)

## 2025-08-13 PROCEDURE — 36415 COLL VENOUS BLD VENIPUNCTURE: CPT

## 2025-08-13 PROCEDURE — 81256 HFE GENE: CPT

## 2025-08-13 PROCEDURE — 80069 RENAL FUNCTION PANEL: CPT

## 2025-08-13 PROCEDURE — 85027 COMPLETE CBC AUTOMATED: CPT

## 2025-08-13 ASSESSMENT — PAIN SCALES - GENERAL: PAINLEVEL_OUTOF10: SEVERE PAIN (7)

## 2025-08-18 ENCOUNTER — OFFICE VISIT (OUTPATIENT)
Dept: OPHTHALMOLOGY | Facility: CLINIC | Age: 60
End: 2025-08-18
Payer: COMMERCIAL

## 2025-08-18 DIAGNOSIS — E11.3213 TYPE 2 DIABETES MELLITUS WITH BOTH EYES AFFECTED BY MILD NONPROLIFERATIVE RETINOPATHY AND MACULAR EDEMA, WITH LONG-TERM CURRENT USE OF INSULIN (H): ICD-10-CM

## 2025-08-18 DIAGNOSIS — Z79.4 TYPE 2 DIABETES MELLITUS WITH BOTH EYES AFFECTED BY MILD NONPROLIFERATIVE RETINOPATHY AND MACULAR EDEMA, WITH LONG-TERM CURRENT USE OF INSULIN (H): ICD-10-CM

## 2025-08-18 DIAGNOSIS — H35.81 DIABETIC MACULAR EDEMA, LEFT EYE (H): Primary | ICD-10-CM

## 2025-08-18 DIAGNOSIS — E11.311 DIABETIC MACULAR EDEMA, LEFT EYE (H): Primary | ICD-10-CM

## 2025-08-18 PROCEDURE — 250N000011 HC RX IP 250 OP 636: Mod: JZ

## 2025-08-18 PROCEDURE — 99214 OFFICE O/P EST MOD 30 MIN: CPT

## 2025-08-18 PROCEDURE — 99213 OFFICE O/P EST LOW 20 MIN: CPT | Mod: 25

## 2025-08-18 PROCEDURE — 92134 CPTRZ OPH DX IMG PST SGM RTA: CPT

## 2025-08-18 PROCEDURE — 92134 CPTRZ OPH DX IMG PST SGM RTA: CPT | Mod: 26

## 2025-08-18 PROCEDURE — 67028 INJECTION EYE DRUG: CPT | Mod: LT

## 2025-08-18 PROCEDURE — 2022F DILAT RTA XM EVC RTNOPTHY: CPT

## 2025-08-18 RX ADMIN — AFLIBERCEPT 2 MG: 40 INJECTION, SOLUTION INTRAVITREAL at 08:39

## 2025-08-18 RX ADMIN — LIDOCAINE HYDROCHLORIDE 0.5 ML: 20 INJECTION, SOLUTION EPIDURAL; INFILTRATION; INTRACAUDAL; PERINEURAL at 08:41

## 2025-08-18 ASSESSMENT — VISUAL ACUITY
OS_SC+: -2
OS_SC: 20/50
METHOD: SNELLEN - LINEAR

## 2025-08-18 ASSESSMENT — TONOMETRY
IOP_METHOD: TONOPEN
OS_IOP_MMHG: 18
OD_IOP_MMHG: 19

## 2025-08-18 ASSESSMENT — EXTERNAL EXAM - RIGHT EYE: OD_EXAM: NORMAL

## 2025-08-18 ASSESSMENT — SLIT LAMP EXAM - LIDS
COMMENTS: NORMAL
COMMENTS: NORMAL

## 2025-08-18 ASSESSMENT — CUP TO DISC RATIO: OS_RATIO: 0.1

## 2025-08-18 ASSESSMENT — EXTERNAL EXAM - LEFT EYE: OS_EXAM: NORMAL

## 2025-08-26 RX ORDER — ALBUTEROL SULFATE 0.83 MG/ML
2.5 SOLUTION RESPIRATORY (INHALATION)
OUTPATIENT
Start: 2025-08-27

## 2025-08-26 RX ORDER — DIPHENHYDRAMINE HYDROCHLORIDE 50 MG/ML
50 INJECTION, SOLUTION INTRAMUSCULAR; INTRAVENOUS
Start: 2025-08-27

## 2025-08-26 RX ORDER — MEPERIDINE HYDROCHLORIDE 25 MG/ML
25 INJECTION INTRAMUSCULAR; INTRAVENOUS; SUBCUTANEOUS
OUTPATIENT
Start: 2025-08-27

## 2025-08-26 RX ORDER — EPINEPHRINE 1 MG/ML
0.3 INJECTION, SOLUTION INTRAMUSCULAR; SUBCUTANEOUS EVERY 5 MIN PRN
OUTPATIENT
Start: 2025-08-27

## 2025-08-26 RX ORDER — ALBUTEROL SULFATE 90 UG/1
1-2 INHALANT RESPIRATORY (INHALATION)
Start: 2025-08-27

## 2025-08-26 RX ORDER — METHYLPREDNISOLONE SODIUM SUCCINATE 40 MG/ML
40 INJECTION INTRAMUSCULAR; INTRAVENOUS
Start: 2025-08-27

## 2025-08-26 RX ORDER — DIPHENHYDRAMINE HYDROCHLORIDE 50 MG/ML
25 INJECTION, SOLUTION INTRAMUSCULAR; INTRAVENOUS
Start: 2025-08-27

## 2025-08-27 ENCOUNTER — PATIENT OUTREACH (OUTPATIENT)
Dept: CARE COORDINATION | Facility: CLINIC | Age: 60
End: 2025-08-27

## 2025-08-27 ENCOUNTER — INFUSION THERAPY VISIT (OUTPATIENT)
Dept: ONCOLOGY | Facility: CLINIC | Age: 60
End: 2025-08-27
Attending: INTERNAL MEDICINE
Payer: COMMERCIAL

## 2025-08-27 VITALS
OXYGEN SATURATION: 96 % | DIASTOLIC BLOOD PRESSURE: 64 MMHG | WEIGHT: 242.8 LBS | RESPIRATION RATE: 18 BRPM | SYSTOLIC BLOOD PRESSURE: 101 MMHG | HEART RATE: 84 BPM | BODY MASS INDEX: 32.92 KG/M2 | TEMPERATURE: 98.3 F

## 2025-08-27 DIAGNOSIS — D63.1 ANEMIA OF CHRONIC RENAL FAILURE, STAGE 3B (H): Primary | ICD-10-CM

## 2025-08-27 DIAGNOSIS — Z94.4 LIVER REPLACED BY TRANSPLANT (H): ICD-10-CM

## 2025-08-27 DIAGNOSIS — N18.31 STAGE 3A CHRONIC KIDNEY DISEASE (H): ICD-10-CM

## 2025-08-27 DIAGNOSIS — N18.32 ANEMIA IN STAGE 3B CHRONIC KIDNEY DISEASE (H): ICD-10-CM

## 2025-08-27 DIAGNOSIS — D63.1 ANEMIA IN STAGE 3B CHRONIC KIDNEY DISEASE (H): ICD-10-CM

## 2025-08-27 DIAGNOSIS — E87.5 HYPERKALEMIA: ICD-10-CM

## 2025-08-27 DIAGNOSIS — N18.32 ANEMIA OF CHRONIC RENAL FAILURE, STAGE 3B (H): Primary | ICD-10-CM

## 2025-08-27 DIAGNOSIS — N18.32 STAGE 3B CHRONIC KIDNEY DISEASE (H): ICD-10-CM

## 2025-08-27 LAB
ALBUMIN SERPL BCG-MCNC: 4.1 G/DL (ref 3.5–5.2)
ALP SERPL-CCNC: 178 U/L (ref 40–150)
ALT SERPL W P-5'-P-CCNC: 23 U/L (ref 0–70)
ANION GAP SERPL CALCULATED.3IONS-SCNC: 16 MMOL/L (ref 7–15)
AST SERPL W P-5'-P-CCNC: 28 U/L (ref 0–45)
BILIRUB SERPL-MCNC: 0.8 MG/DL
BILIRUBIN DIRECT (ROCHE PRO & PURE): 0.38 MG/DL (ref 0–0.45)
BUN SERPL-MCNC: 39.8 MG/DL (ref 8–23)
CALCIUM SERPL-MCNC: 9 MG/DL (ref 8.8–10.4)
CHLORIDE SERPL-SCNC: 101 MMOL/L (ref 98–107)
CREAT SERPL-MCNC: 2.24 MG/DL (ref 0.67–1.17)
EGFRCR SERPLBLD CKD-EPI 2021: 33 ML/MIN/1.73M2
ERYTHROCYTE [DISTWIDTH] IN BLOOD BY AUTOMATED COUNT: 13.5 % (ref 10–15)
FERRITIN SERPL-MCNC: 562 NG/ML (ref 31–409)
GLUCOSE SERPL-MCNC: 101 MG/DL (ref 70–99)
HCO3 SERPL-SCNC: 18 MMOL/L (ref 22–29)
HCT VFR BLD AUTO: 29.4 % (ref 40–53)
HCT VFR BLD AUTO: 29.8 % (ref 40–53)
HGB BLD-MCNC: 9.6 G/DL (ref 13.3–17.7)
HGB BLD-MCNC: 9.9 G/DL (ref 13.3–17.7)
IRON BINDING CAPACITY (ROCHE): 216 UG/DL (ref 240–430)
IRON SATN MFR SERPL: 38 % (ref 15–46)
IRON SERPL-MCNC: 82 UG/DL (ref 61–157)
MCH RBC QN AUTO: 30 PG (ref 26.5–33)
MCHC RBC AUTO-ENTMCNC: 33.2 G/DL (ref 31.5–36.5)
MCV RBC AUTO: 89.9 FL (ref 78–100)
MCV RBC AUTO: 89.9 FL (ref 78–100)
MCV RBC AUTO: 90.3 FL (ref 78–100)
PHOSPHATE SERPL-MCNC: 4.3 MG/DL (ref 2.5–4.5)
PLATELET # BLD AUTO: 103 10E3/UL (ref 150–450)
POTASSIUM SERPL-SCNC: 4.8 MMOL/L (ref 3.4–5.3)
PROT SERPL-MCNC: 6.4 G/DL (ref 6.4–8.3)
RBC # BLD AUTO: 3.3 10E6/UL (ref 4.4–5.9)
SODIUM SERPL-SCNC: 135 MMOL/L (ref 135–145)
WBC # BLD AUTO: 5.5 10E3/UL (ref 4–11)

## 2025-08-27 PROCEDURE — 84155 ASSAY OF PROTEIN SERUM: CPT

## 2025-08-27 PROCEDURE — 84460 ALANINE AMINO (ALT) (SGPT): CPT

## 2025-08-27 PROCEDURE — 84075 ASSAY ALKALINE PHOSPHATASE: CPT

## 2025-08-27 PROCEDURE — 83550 IRON BINDING TEST: CPT

## 2025-08-27 PROCEDURE — 96372 THER/PROPH/DIAG INJ SC/IM: CPT | Performed by: INTERNAL MEDICINE

## 2025-08-27 PROCEDURE — 82040 ASSAY OF SERUM ALBUMIN: CPT

## 2025-08-27 PROCEDURE — 85018 HEMOGLOBIN: CPT

## 2025-08-27 PROCEDURE — 85018 HEMOGLOBIN: CPT | Performed by: INTERNAL MEDICINE

## 2025-08-27 PROCEDURE — 84450 TRANSFERASE (AST) (SGOT): CPT

## 2025-08-27 PROCEDURE — 250N000011 HC RX IP 250 OP 636: Mod: JZ | Performed by: INTERNAL MEDICINE

## 2025-08-27 PROCEDURE — 82728 ASSAY OF FERRITIN: CPT

## 2025-08-27 PROCEDURE — 36415 COLL VENOUS BLD VENIPUNCTURE: CPT

## 2025-08-27 PROCEDURE — 82247 BILIRUBIN TOTAL: CPT

## 2025-08-27 PROCEDURE — 82248 BILIRUBIN DIRECT: CPT

## 2025-08-27 RX ADMIN — DARBEPOETIN ALFA 100 MCG: 100 INJECTION, SOLUTION INTRAVENOUS; SUBCUTANEOUS at 08:36

## 2025-08-27 ASSESSMENT — PAIN SCALES - GENERAL: PAINLEVEL_OUTOF10: MILD PAIN (2)

## 2025-08-28 ENCOUNTER — MYC MEDICAL ADVICE (OUTPATIENT)
Dept: INTERNAL MEDICINE | Facility: CLINIC | Age: 60
End: 2025-08-28
Payer: COMMERCIAL

## 2025-08-28 DIAGNOSIS — M15.0 PRIMARY OSTEOARTHRITIS INVOLVING MULTIPLE JOINTS: ICD-10-CM

## 2025-08-28 DIAGNOSIS — E11.42 DIABETIC POLYNEUROPATHY ASSOCIATED WITH TYPE 2 DIABETES MELLITUS (H): ICD-10-CM

## 2025-08-28 RX ORDER — OXYCODONE HYDROCHLORIDE 5 MG/1
5 TABLET ORAL 4 TIMES DAILY PRN
Qty: 30 TABLET | Refills: 0 | Status: SHIPPED | OUTPATIENT
Start: 2025-08-28

## 2025-08-28 RX ORDER — GABAPENTIN 800 MG/1
800 TABLET ORAL 3 TIMES DAILY
Qty: 90 TABLET | Refills: 1 | Status: SHIPPED | OUTPATIENT
Start: 2025-08-28

## 2025-08-29 DIAGNOSIS — N18.31 STAGE 3A CHRONIC KIDNEY DISEASE (H): ICD-10-CM

## 2025-09-02 ENCOUNTER — OFFICE VISIT (OUTPATIENT)
Dept: INTERNAL MEDICINE | Facility: CLINIC | Age: 60
End: 2025-09-02
Payer: COMMERCIAL

## 2025-09-02 VITALS
HEIGHT: 69 IN | OXYGEN SATURATION: 99 % | RESPIRATION RATE: 16 BRPM | WEIGHT: 234.7 LBS | BODY MASS INDEX: 34.76 KG/M2 | TEMPERATURE: 98 F | HEART RATE: 76 BPM | DIASTOLIC BLOOD PRESSURE: 67 MMHG | SYSTOLIC BLOOD PRESSURE: 106 MMHG

## 2025-09-02 DIAGNOSIS — J01.00 ACUTE MAXILLARY SINUSITIS, RECURRENCE NOT SPECIFIED: Primary | ICD-10-CM

## 2025-09-02 DIAGNOSIS — J06.9 UPPER RESPIRATORY TRACT INFECTION, UNSPECIFIED TYPE: ICD-10-CM

## 2025-09-02 PROCEDURE — 99213 OFFICE O/P EST LOW 20 MIN: CPT | Mod: GE

## 2025-09-02 PROCEDURE — 3074F SYST BP LT 130 MM HG: CPT

## 2025-09-02 PROCEDURE — 3078F DIAST BP <80 MM HG: CPT

## 2025-09-02 ASSESSMENT — ANXIETY QUESTIONNAIRES
8. IF YOU CHECKED OFF ANY PROBLEMS, HOW DIFFICULT HAVE THESE MADE IT FOR YOU TO DO YOUR WORK, TAKE CARE OF THINGS AT HOME, OR GET ALONG WITH OTHER PEOPLE?: NOT DIFFICULT AT ALL
7. FEELING AFRAID AS IF SOMETHING AWFUL MIGHT HAPPEN: NOT AT ALL
7. FEELING AFRAID AS IF SOMETHING AWFUL MIGHT HAPPEN: NOT AT ALL
GAD7 TOTAL SCORE: 0
IF YOU CHECKED OFF ANY PROBLEMS ON THIS QUESTIONNAIRE, HOW DIFFICULT HAVE THESE PROBLEMS MADE IT FOR YOU TO DO YOUR WORK, TAKE CARE OF THINGS AT HOME, OR GET ALONG WITH OTHER PEOPLE: NOT DIFFICULT AT ALL
GAD7 TOTAL SCORE: 0
GAD7 TOTAL SCORE: 0
6. BECOMING EASILY ANNOYED OR IRRITABLE: NOT AT ALL
4. TROUBLE RELAXING: NOT AT ALL
5. BEING SO RESTLESS THAT IT IS HARD TO SIT STILL: NOT AT ALL
2. NOT BEING ABLE TO STOP OR CONTROL WORRYING: NOT AT ALL
1. FEELING NERVOUS, ANXIOUS, OR ON EDGE: NOT AT ALL
3. WORRYING TOO MUCH ABOUT DIFFERENT THINGS: NOT AT ALL

## 2025-09-02 ASSESSMENT — PATIENT HEALTH QUESTIONNAIRE - PHQ9
SUM OF ALL RESPONSES TO PHQ QUESTIONS 1-9: 0
SUM OF ALL RESPONSES TO PHQ QUESTIONS 1-9: 0
10. IF YOU CHECKED OFF ANY PROBLEMS, HOW DIFFICULT HAVE THESE PROBLEMS MADE IT FOR YOU TO DO YOUR WORK, TAKE CARE OF THINGS AT HOME, OR GET ALONG WITH OTHER PEOPLE: NOT DIFFICULT AT ALL

## 2025-09-03 RX ORDER — EMPAGLIFLOZIN 10 MG/1
10 TABLET, FILM COATED ORAL DAILY
Qty: 90 TABLET | Refills: 1 | Status: SHIPPED | OUTPATIENT
Start: 2025-09-03

## (undated) DEVICE — SOL WATER 10ML VIAL 6332318510

## (undated) DEVICE — DRAPE MAYO STAND 23X54 8337

## (undated) DEVICE — DRSG ABDOMINAL 07 1/2X8" 7197D

## (undated) DEVICE — COVER CAMERA IN-LIGHT DISP LT-C02

## (undated) DEVICE — SU PROLENE 3-0 SHDA 36" 8522H

## (undated) DEVICE — INSERT FOGARTY 33MM TRACTION HYDRAJAW HYDRA33

## (undated) DEVICE — SUCTION MANIFOLD DORNOCH ULTRA CART UL-CL500

## (undated) DEVICE — LINEN GOWN XLG 5407

## (undated) DEVICE — Device

## (undated) DEVICE — PREP POVIDONE IODINE SOLUTION 10% 120ML

## (undated) DEVICE — GLOVE BIOGEL PI MICRO SZ 7.5 48575

## (undated) DEVICE — DEVICE CATH STABILIZATION STATLOCK FOLEY 3-WAY FOL0105

## (undated) DEVICE — SU SILK 2-0 TIE 12X30" A305H

## (undated) DEVICE — SU VICRYL 2-0 TIE 12X18" J905T

## (undated) DEVICE — CLIP ENDO HEMO-LOC PURPLE LG 544240

## (undated) DEVICE — PREP SKIN SCRUB TRAY 4461A

## (undated) DEVICE — SOL WATER IRRIG 1000ML BOTTLE 2F7114

## (undated) DEVICE — SU VICRYL 1 CT-1 27" UND J261H

## (undated) DEVICE — PAD CHUX UNDERPAD 23X24" 7136

## (undated) DEVICE — BNDG KLING 2" 2231

## (undated) DEVICE — DRSG ABTHERA NEGATIVE PRESSURE WOUND 370605

## (undated) DEVICE — ENDO DEVICE LOCKING AND BIOPSY CAP M00545261

## (undated) DEVICE — WIPES FOLEY CARE SURESTEP PROVON DFC100

## (undated) DEVICE — SU PDS II 5-0 RB-1 27" Z303H

## (undated) DEVICE — LINEN ORTHO PACK 5446

## (undated) DEVICE — STPL 100 X 3.8MM GIA10038S

## (undated) DEVICE — WIRE GUIDE 0.025"X270CM STR VISIGLIDE G-240-2527S

## (undated) DEVICE — SOL NACL 0.9% IRRIG 500ML BOTTLE 2F7123

## (undated) DEVICE — STPL SKIN 35W 059037

## (undated) DEVICE — PREP CHLORHEXIDINE 4% 4OZ (HIBICLENS) 57504

## (undated) DEVICE — ENDO CATH BALLOON EXTRACTOR PRO RX 09-12MM 4700

## (undated) DEVICE — SU SILK 3-0 TIE 12X30" A304H

## (undated) DEVICE — NDL BLUNT 18GA 1" W/O FILTER 305181

## (undated) DEVICE — SU SILK 0 TIE 6X18" A186H

## (undated) DEVICE — SU SILK 4-0 TIE 12X30" A303H

## (undated) DEVICE — CATARACT BLADE PACK 2.6MM 58001899

## (undated) DEVICE — CLIP HORIZON MED BLUE 002200

## (undated) DEVICE — EYE KNIFE STILETTO VISITEC 1.1MM ANG 45DEG SIDEPORT 376620

## (undated) DEVICE — VESSEL LOOPS BLUE SUPERMAXI 011022PBX

## (undated) DEVICE — SPONGE LAP 18X18" X8435

## (undated) DEVICE — STPL ENDO RELOAD TA 30X2.5MM 010911L

## (undated) DEVICE — SURGICEL FIBRILLAR HEMOSTAT 4"X4" 1963

## (undated) DEVICE — SU SILK 0 SH 30" K834H

## (undated) DEVICE — LINEN TOWEL PACK X30 5481

## (undated) DEVICE — ESU HANDPIECE ABC BEND-A-BEAM 6" 134006

## (undated) DEVICE — TUBE FEEDING 05FR 20" 461412

## (undated) DEVICE — PREP POVIDONE IODINE SCRUB 7.5% 120ML

## (undated) DEVICE — TEST TUBE W/SCREW CAP 17361

## (undated) DEVICE — SU VICRYL 3-0 SH CR 8X18" J774

## (undated) DEVICE — DRAIN JACKSON PRATT RESERVOIR 100ML SU130-1305

## (undated) DEVICE — DRSG STERI STRIP 1/2X4" R1547

## (undated) DEVICE — SU SILK 3-0 SH 30" K832H

## (undated) DEVICE — BLADE CLIPPER SGL USE 9680

## (undated) DEVICE — SU PDS II 1 CTX 36" Z371T

## (undated) DEVICE — DRAPE STOCKINETTE 4" 8544

## (undated) DEVICE — ESU GROUND PAD ADULT W/CORD E7507

## (undated) DEVICE — ESU LIGASURE IMPACT OPEN SEALER/DVDR CVD LG JAW LF4418

## (undated) DEVICE — SUCTION TIP POOLE K770

## (undated) DEVICE — NDL 27GA 1.25" 305136

## (undated) DEVICE — DRSG MEDIPORE 3 1/2X13 3/4" 3573

## (undated) DEVICE — SU PDS II 0 TP-1 60" Z991G

## (undated) DEVICE — SU VICRYL 2-0 SH 27" UND J417H

## (undated) DEVICE — DRAPE IOBAN INCISE 23X17" 6650EZ

## (undated) DEVICE — SU SILK 2-0 SH 30" K833H

## (undated) DEVICE — SU MONOCRYL 5-0 P-3 18" UND Y493G

## (undated) DEVICE — SU SILK 1 TIE 6X30" A307H

## (undated) DEVICE — DRAIN BLAKE 19FR W/TROCAR SIL

## (undated) DEVICE — BLADE KNIFE SURG 15 371115

## (undated) DEVICE — ENDO TUBING CO2 SMARTCAP STERILE DISP 100145CO2EXT

## (undated) DEVICE — DRAPE STERI TOWEL LG 1010

## (undated) DEVICE — DRAPE SHEET REV FOLD 3/4 9349

## (undated) DEVICE — SU PROLENE 6-0 RB-2DA 30" 8711H

## (undated) DEVICE — SYR 10ML SLIP TIP W/O NDL 303134

## (undated) DEVICE — CLIP HORIZON SM RED WIDE SLOT 001201

## (undated) DEVICE — PITCHER STERILE 1000ML  SSK9004A

## (undated) DEVICE — GLIDEWIRE TERUMO .035X180 ANG STIFF GS3508

## (undated) DEVICE — SU PROLENE 5-0 RB-1DA 36"  8556H

## (undated) DEVICE — EYE SHIELD PLASTIC

## (undated) DEVICE — SUCTION TIP YANKAUER W/O VENT K86

## (undated) DEVICE — TUBING SUCTION 10'X3/16" N510

## (undated) DEVICE — SOL WATER IRRIG 1000ML BOTTLE 07139-09

## (undated) DEVICE — RX BACITRACIN OINTMENT 0.9G 1/32OZ CUR001109

## (undated) DEVICE — EYE KNIFE SLIT XSTAR VISITEC 2.6MM 45DEG 373726

## (undated) DEVICE — SOL WATER IRRIG 500ML BOTTLE 2F7113

## (undated) DEVICE — SU PDS II 6-0 RB-2DA 30" Z149H

## (undated) DEVICE — VESSEL LOOPS YELLOW MAXI 31145694

## (undated) DEVICE — PREP CHLORAPREP 26ML TINTED ORANGE  260815

## (undated) DEVICE — PACK HAND CUSTOM ASC

## (undated) DEVICE — EYE CANN IRR 25GA CYSTOTOME 581610

## (undated) DEVICE — RETR VISCERA FISH MED 3204

## (undated) DEVICE — CATH TRAY FOLEY SURESTEP 16FR W/URINE MTR STATLK LF A303416A

## (undated) DEVICE — ESU GROUND PAD THERMOGUARD PLUS ABC PEDS 7-382

## (undated) DEVICE — SU PROLENE 4-0 SHDA 36" 8521H

## (undated) DEVICE — SU UMBILICAL TAPE .125X30" U11T

## (undated) DEVICE — SU SILK 3-0 SH CR 8X18" C013D

## (undated) DEVICE — LABEL MEDICATION SYSTEM 3303-P

## (undated) DEVICE — SU PDS II 1 TP-1 54" Z879G

## (undated) DEVICE — ENDO BITE BLOCK ADULT OMNI-BLOC

## (undated) DEVICE — SU PDS II 1 TP-1 48" Z880G

## (undated) DEVICE — SU VICRYL 3-0 SH 27" J316H

## (undated) DEVICE — SPONGE KITTNER 30-101

## (undated) DEVICE — NDL COUNTER 20CT 31142493

## (undated) DEVICE — DRSG GAUZE 2X2" 8042

## (undated) DEVICE — ESU PENCIL W/COATED BLADE E2450H

## (undated) DEVICE — BASIN SET SINGLE STERILE 13752-624

## (undated) DEVICE — SUCTION TIP YANKAUER VIAGUARD W/SLEEVE SMP-2006-001SS

## (undated) DEVICE — PACK CATARACT CUSTOM ASC SEY15CPUMC

## (undated) DEVICE — SUTURE BOOTS 051003PBX

## (undated) DEVICE — DEFIB PRO-PADZ LVP LQD GEL ADULT 8900-2105-01

## (undated) DEVICE — DRAPE IOBAN INCISE 13X13" 6640EZ

## (undated) DEVICE — EYE TIP IRRIGATION & ASPIRATION POLYMER 35D BENT 8065751511

## (undated) DEVICE — STPL CIRCULAR DST 28MM W/TILT TIP EEA28

## (undated) DEVICE — ENDO FUSION OMNI-TOME G31903

## (undated) DEVICE — SU PROLENE 4-0 RB-1DA 36" 8557H

## (undated) DEVICE — SU PROLENE 1 CTX 30" 8455H

## (undated) DEVICE — CLIP HORIZON LG ORANGE 004200

## (undated) DEVICE — LINEN TOWEL PACK X6 WHITE 5487

## (undated) DEVICE — NDL BLUNT 15GA 1.5"

## (undated) DEVICE — DRAIN JACKSON PRATT RESERVOIR 400ML SU130-1000

## (undated) DEVICE — STPL LINEAR 90MM TL90

## (undated) DEVICE — SU SILK 0 TIE 6X30" A306H

## (undated) DEVICE — KIT NEW IMAGE COLOSTOMY/ILEOSTOMY 2 3/4" LF 19354

## (undated) DEVICE — SU PROLENE 7-0 BV-1DA 30" 8703H

## (undated) DEVICE — APPLICATOR COTTON TIP 6"X2 STERILE LF 6012

## (undated) DEVICE — DRAPE IOBAN C-SECTION W/POUCH 30X35" 6657

## (undated) DEVICE — STPL RELOAD 100 X 3.8MM GIA10038L

## (undated) DEVICE — TUBE FEEDING 08FR 20" 461701

## (undated) DEVICE — SU PROLENE 5-0 RB-2DA 30" 8710H

## (undated) DEVICE — DRSG PRIMAPORE 03 1/8X6" 66000318

## (undated) DEVICE — SOL NACL 0.9% IRRIG 1000ML BOTTLE 2F7124

## (undated) DEVICE — ESU ELEC BLADE 6" COATED E1450-6

## (undated) DEVICE — GLOVE PROTEXIS POWDER FREE SMT 6.5  2D72PT65X

## (undated) DEVICE — DRSG DRAIN 2X2" 7087

## (undated) DEVICE — DRSG GAUZE 4X4" TRAY 6939

## (undated) DEVICE — SU PROLENE 2-0 SHDA 36" 8523H

## (undated) DEVICE — TUBE NASOGASTRIC ENTRIFLEX 10FR 43"

## (undated) DEVICE — LINEN TOWEL PACK X5 5464

## (undated) DEVICE — KIT CONNECTOR FOR OLYMPUS ENDOSCOPES DEFENDO 100310

## (undated) DEVICE — SU SILK 2-0 SH CR 5X18" C0125

## (undated) DEVICE — STPL ENDO RELOAD 30X2.5MM ROTIC 030451

## (undated) DEVICE — BIOPSY VALVE BIOSHIELD 00711135

## (undated) DEVICE — TOURNI-COT LARGE

## (undated) DEVICE — TUBING IRRIG CYSTO/BLADDER SET 81" LF 2C4040

## (undated) DEVICE — EYE PACK CUSTOM ANTERIOR 30DEG TIP CENTURION PPK6682-04

## (undated) DEVICE — DRAPE ISOLATION BAG 1003

## (undated) DEVICE — ESU HOLSTER PLASTIC DISP E2400

## (undated) DEVICE — TAPE CLOTH 3" CARDINAL 3TRCL03

## (undated) DEVICE — CLIP ENDO HEMO-LOC GREEN MED/LG 544230

## (undated) DEVICE — SPONGE RAY-TEC 4X8" 7318

## (undated) DEVICE — GLOVE PROTEXIS POWDER FREE 8.0 ORTHOPEDIC 2D73ET80

## (undated) DEVICE — HANDPIECE/ FOOT CONTROL ARGON BEAM COAG 45DEG 130345

## (undated) DEVICE — NDL 25GA 2"  8881200441

## (undated) DEVICE — SU VICRYL 3-0 SH 27" UND J416H

## (undated) DEVICE — PACK ENDOSCOPY GI CUSTOM UMMC

## (undated) DEVICE — DRSG TEGADERM 8X12" 1629

## (undated) DEVICE — BLADE KNIFE SURG 10 371110

## (undated) DEVICE — DRAPE SLUSH/WARMER 66X44" ORS-320

## (undated) DEVICE — KIT ENDO FIRST STEP DISINFECTANT 200ML W/POUCH EP-4

## (undated) DEVICE — SU VICRYL 0 TIE 54" J608H

## (undated) DEVICE — SU PDS II 0 CTX 36" Z370T

## (undated) DEVICE — EYE RING MALYUGIN PUPIL EXPANDER 7.0MM MAL-000-2

## (undated) DEVICE — DRAPE SHEET MED 44X70" 9355

## (undated) DEVICE — EYE CANN IRR 27GA ANTERIOR CHAMBER 581280

## (undated) DEVICE — NDL BLUNT 17GA 1.5" 8881202330

## (undated) DEVICE — DRSG PRIMAPORE 02X3" 7133

## (undated) DEVICE — CANNISTER ABTHERA NEGATIVE PRESSURE WOUND 370620S

## (undated) DEVICE — PACK AB HYST II

## (undated) DEVICE — STPL ENDO TA30 2.5MM MULTIFIRE 010901

## (undated) DEVICE — PACKING NUGAUZE 1" PLAIN 7633

## (undated) DEVICE — SU MONOCRYL 2-0 SH 27" UND Y417H

## (undated) DEVICE — INSERT FOGARTY 61MM TRACTION HYDRAJAW HYDRA61

## (undated) DEVICE — PACK SET-UP STD 9102

## (undated) DEVICE — CATH TRAY FOLEY SURESTEP 16FR W/URNE MTR STLK LATEX A303316A

## (undated) DEVICE — DRAPE LEGGINGS CLEAR 8430

## (undated) DEVICE — EYE CANN IRR 25GA HYDRODISSECTING 585037

## (undated) DEVICE — SU ETHILON 2-0 FS 18" 664H

## (undated) RX ORDER — PROPOFOL 10 MG/ML
INJECTION, EMULSION INTRAVENOUS
Status: DISPENSED
Start: 2017-07-26

## (undated) RX ORDER — ALBUMIN (HUMAN) 12.5 G/50ML
SOLUTION INTRAVENOUS
Status: DISPENSED
Start: 2017-01-13

## (undated) RX ORDER — FENTANYL CITRATE 50 UG/ML
INJECTION, SOLUTION INTRAMUSCULAR; INTRAVENOUS
Status: DISPENSED
Start: 2017-08-09

## (undated) RX ORDER — DIAZEPAM 5 MG
TABLET ORAL
Status: DISPENSED
Start: 2017-11-10

## (undated) RX ORDER — ALBUMIN (HUMAN) 12.5 G/50ML
SOLUTION INTRAVENOUS
Status: DISPENSED
Start: 2017-01-27

## (undated) RX ORDER — DEXAMETHASONE SODIUM PHOSPHATE 4 MG/ML
INJECTION, SOLUTION INTRA-ARTICULAR; INTRALESIONAL; INTRAMUSCULAR; INTRAVENOUS; SOFT TISSUE
Status: DISPENSED
Start: 2018-01-05

## (undated) RX ORDER — DEXTROSE MONOHYDRATE 25 G/50ML
INJECTION, SOLUTION INTRAVENOUS
Status: DISPENSED
Start: 2023-08-17

## (undated) RX ORDER — ROCURONIUM BROMIDE 50 MG/5 ML
SYRINGE (ML) INTRAVENOUS
Status: DISPENSED
Start: 2017-07-05

## (undated) RX ORDER — FENTANYL CITRATE 50 UG/ML
INJECTION, SOLUTION INTRAMUSCULAR; INTRAVENOUS
Status: DISPENSED
Start: 2017-07-21

## (undated) RX ORDER — PROPOFOL 10 MG/ML
INJECTION, EMULSION INTRAVENOUS
Status: DISPENSED
Start: 2017-07-04

## (undated) RX ORDER — FENTANYL CITRATE 50 UG/ML
INJECTION, SOLUTION INTRAMUSCULAR; INTRAVENOUS
Status: DISPENSED
Start: 2018-05-22

## (undated) RX ORDER — ALBUMIN (HUMAN) 12.5 G/50ML
SOLUTION INTRAVENOUS
Status: DISPENSED
Start: 2017-02-17

## (undated) RX ORDER — PHENYLEPHRINE HCL IN 0.9% NACL 1 MG/10 ML
SYRINGE (ML) INTRAVENOUS
Status: DISPENSED
Start: 2017-07-05

## (undated) RX ORDER — DIPHENHYDRAMINE HYDROCHLORIDE 50 MG/ML
INJECTION INTRAMUSCULAR; INTRAVENOUS
Status: DISPENSED
Start: 2017-07-21

## (undated) RX ORDER — PROPOFOL 10 MG/ML
INJECTION, EMULSION INTRAVENOUS
Status: DISPENSED
Start: 2018-02-14

## (undated) RX ORDER — DEXTROSE MONOHYDRATE 25 G/50ML
INJECTION, SOLUTION INTRAVENOUS
Status: DISPENSED
Start: 2017-08-24

## (undated) RX ORDER — FENTANYL CITRATE 50 UG/ML
INJECTION, SOLUTION INTRAMUSCULAR; INTRAVENOUS
Status: DISPENSED
Start: 2017-09-21

## (undated) RX ORDER — FENTANYL CITRATE 50 UG/ML
INJECTION, SOLUTION INTRAMUSCULAR; INTRAVENOUS
Status: DISPENSED
Start: 2022-10-24

## (undated) RX ORDER — PHENYLEPHRINE HCL IN 0.9% NACL 1 MG/10 ML
SYRINGE (ML) INTRAVENOUS
Status: DISPENSED
Start: 2018-01-05

## (undated) RX ORDER — FENTANYL CITRATE 50 UG/ML
INJECTION, SOLUTION INTRAMUSCULAR; INTRAVENOUS
Status: DISPENSED
Start: 2017-08-24

## (undated) RX ORDER — EPHEDRINE SULFATE 50 MG/ML
INJECTION, SOLUTION INTRAMUSCULAR; INTRAVENOUS; SUBCUTANEOUS
Status: DISPENSED
Start: 2018-01-05

## (undated) RX ORDER — LIDOCAINE HYDROCHLORIDE 10 MG/ML
INJECTION, SOLUTION EPIDURAL; INFILTRATION; INTRACAUDAL; PERINEURAL
Status: DISPENSED
Start: 2018-02-14

## (undated) RX ORDER — FENTANYL CITRATE 50 UG/ML
INJECTION, SOLUTION INTRAMUSCULAR; INTRAVENOUS
Status: DISPENSED
Start: 2017-07-05

## (undated) RX ORDER — ALBUMIN (HUMAN) 12.5 G/50ML
SOLUTION INTRAVENOUS
Status: DISPENSED
Start: 2017-01-18

## (undated) RX ORDER — EPHEDRINE SULFATE 50 MG/ML
INJECTION, SOLUTION INTRAMUSCULAR; INTRAVENOUS; SUBCUTANEOUS
Status: DISPENSED
Start: 2017-07-26

## (undated) RX ORDER — LIDOCAINE HYDROCHLORIDE 20 MG/ML
INJECTION, SOLUTION EPIDURAL; INFILTRATION; INTRACAUDAL; PERINEURAL
Status: DISPENSED
Start: 2017-07-26

## (undated) RX ORDER — FENTANYL CITRATE 50 UG/ML
INJECTION, SOLUTION INTRAMUSCULAR; INTRAVENOUS
Status: DISPENSED
Start: 2017-07-26

## (undated) RX ORDER — PHENYLEPHRINE HCL IN 0.9% NACL 1 MG/10 ML
SYRINGE (ML) INTRAVENOUS
Status: DISPENSED
Start: 2018-02-14

## (undated) RX ORDER — CEFAZOLIN SODIUM 2 G/100ML
INJECTION, SOLUTION INTRAVENOUS
Status: DISPENSED
Start: 2017-08-24

## (undated) RX ORDER — ALBUMIN (HUMAN) 12.5 G/50ML
SOLUTION INTRAVENOUS
Status: DISPENSED
Start: 2017-01-06

## (undated) RX ORDER — ALBUMIN (HUMAN) 12.5 G/50ML
SOLUTION INTRAVENOUS
Status: DISPENSED
Start: 2017-02-07

## (undated) RX ORDER — FENTANYL CITRATE 50 UG/ML
INJECTION, SOLUTION INTRAMUSCULAR; INTRAVENOUS
Status: DISPENSED
Start: 2024-02-01

## (undated) RX ORDER — FENTANYL CITRATE 50 UG/ML
INJECTION, SOLUTION INTRAMUSCULAR; INTRAVENOUS
Status: DISPENSED
Start: 2017-08-27

## (undated) RX ORDER — PROPOFOL 10 MG/ML
INJECTION, EMULSION INTRAVENOUS
Status: DISPENSED
Start: 2018-01-05

## (undated) RX ORDER — PHENYLEPHRINE HCL IN 0.9% NACL 1 MG/10 ML
SYRINGE (ML) INTRAVENOUS
Status: DISPENSED
Start: 2017-07-26

## (undated) RX ORDER — LIDOCAINE HYDROCHLORIDE 10 MG/ML
INJECTION, SOLUTION EPIDURAL; INFILTRATION; INTRACAUDAL; PERINEURAL
Status: DISPENSED
Start: 2017-08-11

## (undated) RX ORDER — TRIAMCINOLONE ACETONIDE 40 MG/ML
INJECTION, SUSPENSION INTRA-ARTICULAR; INTRAMUSCULAR
Status: DISPENSED
Start: 2025-02-03

## (undated) RX ORDER — EPHEDRINE SULFATE 50 MG/ML
INJECTION, SOLUTION INTRAMUSCULAR; INTRAVENOUS; SUBCUTANEOUS
Status: DISPENSED
Start: 2017-07-04

## (undated) RX ORDER — LIDOCAINE HYDROCHLORIDE 10 MG/ML
INJECTION, SOLUTION EPIDURAL; INFILTRATION; INTRACAUDAL; PERINEURAL
Status: DISPENSED
Start: 2017-11-10

## (undated) RX ORDER — LIDOCAINE HYDROCHLORIDE 10 MG/ML
INJECTION, SOLUTION EPIDURAL; INFILTRATION; INTRACAUDAL; PERINEURAL
Status: DISPENSED
Start: 2017-08-24

## (undated) RX ORDER — ONDANSETRON 2 MG/ML
INJECTION INTRAMUSCULAR; INTRAVENOUS
Status: DISPENSED
Start: 2017-07-26

## (undated) RX ORDER — ALBUMIN (HUMAN) 12.5 G/50ML
SOLUTION INTRAVENOUS
Status: DISPENSED
Start: 2017-02-10

## (undated) RX ORDER — ALBUMIN (HUMAN) 12.5 G/50ML
SOLUTION INTRAVENOUS
Status: DISPENSED
Start: 2017-01-31

## (undated) RX ORDER — TRIAMCINOLONE ACETONIDE 40 MG/ML
INJECTION, SUSPENSION INTRA-ARTICULAR; INTRAMUSCULAR
Status: DISPENSED
Start: 2022-09-13

## (undated) RX ORDER — PAPAVERINE HYDROCHLORIDE 30 MG/ML
INJECTION INTRAMUSCULAR; INTRAVENOUS
Status: DISPENSED
Start: 2017-03-04

## (undated) RX ORDER — ALBUMIN (HUMAN) 12.5 G/50ML
SOLUTION INTRAVENOUS
Status: DISPENSED
Start: 2017-01-20

## (undated) RX ORDER — KETAMINE HYDROCHLORIDE 10 MG/ML
INJECTION, SOLUTION INTRAMUSCULAR; INTRAVENOUS
Status: DISPENSED
Start: 2018-01-05

## (undated) RX ORDER — LIDOCAINE HYDROCHLORIDE 20 MG/ML
INJECTION, SOLUTION EPIDURAL; INFILTRATION; INTRACAUDAL; PERINEURAL
Status: DISPENSED
Start: 2018-01-05

## (undated) RX ORDER — DIPHENHYDRAMINE HYDROCHLORIDE 50 MG/ML
INJECTION INTRAMUSCULAR; INTRAVENOUS
Status: DISPENSED
Start: 2017-08-27

## (undated) RX ORDER — ONDANSETRON 2 MG/ML
INJECTION INTRAMUSCULAR; INTRAVENOUS
Status: DISPENSED
Start: 2017-07-04

## (undated) RX ORDER — FENTANYL CITRATE 50 UG/ML
INJECTION, SOLUTION INTRAMUSCULAR; INTRAVENOUS
Status: DISPENSED
Start: 2018-01-05

## (undated) RX ORDER — SODIUM CHLORIDE 9 MG/ML
INJECTION, SOLUTION INTRAVENOUS
Status: DISPENSED
Start: 2018-01-05

## (undated) RX ORDER — ALBUMIN (HUMAN) 12.5 G/50ML
SOLUTION INTRAVENOUS
Status: DISPENSED
Start: 2017-02-14

## (undated) RX ORDER — ALBUMIN (HUMAN) 12.5 G/50ML
SOLUTION INTRAVENOUS
Status: DISPENSED
Start: 2017-01-10

## (undated) RX ORDER — LIDOCAINE HYDROCHLORIDE 10 MG/ML
INJECTION, SOLUTION EPIDURAL; INFILTRATION; INTRACAUDAL; PERINEURAL
Status: DISPENSED
Start: 2025-02-03

## (undated) RX ORDER — INDOMETHACIN 50 MG/1
SUPPOSITORY RECTAL
Status: DISPENSED
Start: 2018-05-22

## (undated) RX ORDER — GLYCOPYRROLATE 0.2 MG/ML
INJECTION, SOLUTION INTRAMUSCULAR; INTRAVENOUS
Status: DISPENSED
Start: 2017-07-26

## (undated) RX ORDER — LIDOCAINE HYDROCHLORIDE 10 MG/ML
INJECTION, SOLUTION EPIDURAL; INFILTRATION; INTRACAUDAL; PERINEURAL
Status: DISPENSED
Start: 2017-08-15

## (undated) RX ORDER — FENTANYL CITRATE 50 UG/ML
INJECTION, SOLUTION INTRAMUSCULAR; INTRAVENOUS
Status: DISPENSED
Start: 2017-07-04

## (undated) RX ORDER — DOBUTAMINE HYDROCHLORIDE 200 MG/100ML
INJECTION INTRAVENOUS
Status: DISPENSED
Start: 2017-02-08

## (undated) RX ORDER — ALBUMIN (HUMAN) 12.5 G/50ML
SOLUTION INTRAVENOUS
Status: DISPENSED
Start: 2017-02-21

## (undated) RX ORDER — PHENYLEPHRINE HCL IN 0.9% NACL 1 MG/10 ML
SYRINGE (ML) INTRAVENOUS
Status: DISPENSED
Start: 2017-07-04

## (undated) RX ORDER — SIMETHICONE 40MG/0.6ML
SUSPENSION, DROPS(FINAL DOSAGE FORM)(ML) ORAL
Status: DISPENSED
Start: 2017-08-27

## (undated) RX ORDER — SODIUM CHLORIDE 9 MG/ML
INJECTION, SOLUTION INTRAVENOUS
Status: DISPENSED
Start: 2017-09-21

## (undated) RX ORDER — BUPIVACAINE HYDROCHLORIDE AND EPINEPHRINE 5; 5 MG/ML; UG/ML
INJECTION, SOLUTION EPIDURAL; INTRACAUDAL; PERINEURAL
Status: DISPENSED
Start: 2018-02-14

## (undated) RX ORDER — LIDOCAINE HYDROCHLORIDE 10 MG/ML
INJECTION, SOLUTION EPIDURAL; INFILTRATION; INTRACAUDAL; PERINEURAL
Status: DISPENSED
Start: 2018-06-01

## (undated) RX ORDER — EPHEDRINE SULFATE 50 MG/ML
INJECTION, SOLUTION INTRAMUSCULAR; INTRAVENOUS; SUBCUTANEOUS
Status: DISPENSED
Start: 2018-02-14

## (undated) RX ORDER — ONDANSETRON 2 MG/ML
INJECTION INTRAMUSCULAR; INTRAVENOUS
Status: DISPENSED
Start: 2018-01-05

## (undated) RX ORDER — SODIUM CHLORIDE 9 MG/ML
INJECTION, SOLUTION INTRAVENOUS
Status: DISPENSED
Start: 2018-06-01

## (undated) RX ORDER — HEPARIN SODIUM 5000 [USP'U]/.5ML
INJECTION, SOLUTION INTRAVENOUS; SUBCUTANEOUS
Status: DISPENSED
Start: 2018-01-05

## (undated) RX ORDER — LIDOCAINE HYDROCHLORIDE 10 MG/ML
INJECTION, SOLUTION EPIDURAL; INFILTRATION; INTRACAUDAL; PERINEURAL
Status: DISPENSED
Start: 2022-09-13

## (undated) RX ORDER — ALBUMIN (HUMAN) 12.5 G/50ML
SOLUTION INTRAVENOUS
Status: DISPENSED
Start: 2017-02-03

## (undated) RX ORDER — SIMETHICONE 40MG/0.6ML
SUSPENSION, DROPS(FINAL DOSAGE FORM)(ML) ORAL
Status: DISPENSED
Start: 2022-10-24

## (undated) RX ORDER — ALBUMIN (HUMAN) 12.5 G/50ML
SOLUTION INTRAVENOUS
Status: DISPENSED
Start: 2017-01-03

## (undated) RX ORDER — LIDOCAINE HYDROCHLORIDE 10 MG/ML
INJECTION, SOLUTION EPIDURAL; INFILTRATION; INTRACAUDAL; PERINEURAL
Status: DISPENSED
Start: 2017-08-09

## (undated) RX ORDER — CEFAZOLIN SODIUM 2 G/100ML
INJECTION, SOLUTION INTRAVENOUS
Status: DISPENSED
Start: 2017-09-21

## (undated) RX ORDER — FENTANYL CITRATE 50 UG/ML
INJECTION, SOLUTION INTRAMUSCULAR; INTRAVENOUS
Status: DISPENSED
Start: 2018-02-14

## (undated) RX ORDER — ALBUMIN (HUMAN) 12.5 G/50ML
SOLUTION INTRAVENOUS
Status: DISPENSED
Start: 2017-01-24

## (undated) RX ORDER — ROCURONIUM BROMIDE 50 MG/5 ML
SYRINGE (ML) INTRAVENOUS
Status: DISPENSED
Start: 2017-07-04

## (undated) RX ORDER — HEPARIN SODIUM 1000 [USP'U]/ML
INJECTION, SOLUTION INTRAVENOUS; SUBCUTANEOUS
Status: DISPENSED
Start: 2017-03-04

## (undated) RX ORDER — FENTANYL CITRATE 50 UG/ML
INJECTION, SOLUTION INTRAMUSCULAR; INTRAVENOUS
Status: DISPENSED
Start: 2018-06-01

## (undated) RX ORDER — LIDOCAINE HYDROCHLORIDE 10 MG/ML
INJECTION, SOLUTION INFILTRATION; PERINEURAL
Status: DISPENSED
Start: 2017-03-15

## (undated) RX ORDER — FENTANYL CITRATE 50 UG/ML
INJECTION, SOLUTION INTRAMUSCULAR; INTRAVENOUS
Status: DISPENSED
Start: 2017-08-15

## (undated) RX ORDER — SODIUM CHLORIDE, SODIUM LACTATE, POTASSIUM CHLORIDE, CALCIUM CHLORIDE 600; 310; 30; 20 MG/100ML; MG/100ML; MG/100ML; MG/100ML
INJECTION, SOLUTION INTRAVENOUS
Status: DISPENSED
Start: 2018-01-05